# Patient Record
Sex: FEMALE | Race: BLACK OR AFRICAN AMERICAN | NOT HISPANIC OR LATINO | URBAN - METROPOLITAN AREA
[De-identification: names, ages, dates, MRNs, and addresses within clinical notes are randomized per-mention and may not be internally consistent; named-entity substitution may affect disease eponyms.]

---

## 2017-03-01 ENCOUNTER — OUTPATIENT (OUTPATIENT)
Dept: OUTPATIENT SERVICES | Facility: HOSPITAL | Age: 62
LOS: 1 days | End: 2017-03-01
Payer: MEDICAID

## 2017-03-13 DIAGNOSIS — R69 ILLNESS, UNSPECIFIED: ICD-10-CM

## 2017-06-15 ENCOUNTER — RESULT REVIEW (OUTPATIENT)
Age: 62
End: 2017-06-15

## 2017-06-27 ENCOUNTER — EMERGENCY (EMERGENCY)
Facility: HOSPITAL | Age: 62
LOS: 1 days | Discharge: HOME | End: 2017-06-27
Attending: EMERGENCY MEDICINE | Admitting: EMERGENCY MEDICINE
Payer: COMMERCIAL

## 2017-06-27 VITALS
HEIGHT: 61 IN | HEART RATE: 97 BPM | DIASTOLIC BLOOD PRESSURE: 85 MMHG | RESPIRATION RATE: 20 BRPM | SYSTOLIC BLOOD PRESSURE: 139 MMHG | TEMPERATURE: 99 F | OXYGEN SATURATION: 94 %

## 2017-06-27 PROCEDURE — 99285 EMERGENCY DEPT VISIT HI MDM: CPT

## 2017-06-27 RX ORDER — SODIUM CHLORIDE 9 MG/ML
1000 INJECTION INTRAMUSCULAR; INTRAVENOUS; SUBCUTANEOUS ONCE
Qty: 0 | Refills: 0 | Status: COMPLETED | OUTPATIENT
Start: 2017-06-27 | End: 2017-06-27

## 2017-06-27 RX ORDER — SODIUM CHLORIDE 9 MG/ML
3 INJECTION INTRAMUSCULAR; INTRAVENOUS; SUBCUTANEOUS ONCE
Qty: 0 | Refills: 0 | Status: COMPLETED | OUTPATIENT
Start: 2017-06-27 | End: 2017-06-27

## 2017-06-27 RX ADMIN — SODIUM CHLORIDE 1000 MILLILITER(S): 9 INJECTION INTRAMUSCULAR; INTRAVENOUS; SUBCUTANEOUS at 23:57

## 2017-06-27 RX ADMIN — SODIUM CHLORIDE 3 MILLILITER(S): 9 INJECTION INTRAMUSCULAR; INTRAVENOUS; SUBCUTANEOUS at 23:57

## 2017-06-27 NOTE — ED ADULT NURSE NOTE - PMH
Adult Hypothyroidism    Arthritis    Borderline Diabetes Mellitus    Cellulitis  of LLE  Chronic Asthma    Cigarette Smoker    CVA (Cerebral Vascular Accident)    Diverticulosis    H/O: HTN    High Cholesterol    RA (Rheumatoid Arthritis)    Tooth Abscess

## 2017-06-27 NOTE — ED ADULT NURSE NOTE - OBJECTIVE STATEMENT
62 y/o female presents to ED c/o of diarrhea x 2 weeks s/p amoxicillin use for left toe infection. Pt states she was on access-a-ride when they stopped short and she tripped and stubbed toe. Pt was placed on antibiotics and has toe dressed daily. Pt states since she has been on antibiotics, she has been having "mushy and muddy." Pt states she has been having a lot of gas and feels epigastric pressure. Pt also states she had nasal congestion. Pt denies fever, or vomiting. Pt lungs clear bilaterally. Skin warm, dry. Pt has wound on left great toe wound, no drainage noted. Pt abdomen soft, non-distended, non-tender. Pt safety maintained.

## 2017-06-27 NOTE — ED ADULT TRIAGE NOTE - CHIEF COMPLAINT QUOTE
recent hx toe infection, receiving wound care and started on amoxicillin, now with change in stool pattern: "watery to mush", +dark stool x 1 wk, sent for r/o cdiff

## 2017-06-28 VITALS
DIASTOLIC BLOOD PRESSURE: 94 MMHG | SYSTOLIC BLOOD PRESSURE: 161 MMHG | OXYGEN SATURATION: 96 % | RESPIRATION RATE: 20 BRPM | HEART RATE: 93 BPM | TEMPERATURE: 98 F

## 2017-06-28 LAB
ALBUMIN SERPL ELPH-MCNC: 4 G/DL — SIGNIFICANT CHANGE UP (ref 3.3–5)
ALP SERPL-CCNC: 89 U/L — SIGNIFICANT CHANGE UP (ref 40–120)
ALT FLD-CCNC: 26 U/L RC — SIGNIFICANT CHANGE UP (ref 10–45)
ANION GAP SERPL CALC-SCNC: 16 MMOL/L — SIGNIFICANT CHANGE UP (ref 5–17)
APPEARANCE UR: CLEAR — SIGNIFICANT CHANGE UP
AST SERPL-CCNC: 21 U/L — SIGNIFICANT CHANGE UP (ref 10–40)
BASE EXCESS BLDV CALC-SCNC: 2.7 MMOL/L — HIGH (ref -2–2)
BASOPHILS # BLD AUTO: 0.1 K/UL — SIGNIFICANT CHANGE UP (ref 0–0.2)
BASOPHILS NFR BLD AUTO: 0.8 % — SIGNIFICANT CHANGE UP (ref 0–2)
BILIRUB SERPL-MCNC: 0.1 MG/DL — LOW (ref 0.2–1.2)
BILIRUB UR-MCNC: NEGATIVE — SIGNIFICANT CHANGE UP
BUN SERPL-MCNC: 18 MG/DL — SIGNIFICANT CHANGE UP (ref 7–23)
CA-I SERPL-SCNC: 1.24 MMOL/L — SIGNIFICANT CHANGE UP (ref 1.12–1.3)
CALCIUM SERPL-MCNC: 9.8 MG/DL — SIGNIFICANT CHANGE UP (ref 8.4–10.5)
CHLORIDE BLDV-SCNC: 109 MMOL/L — HIGH (ref 96–108)
CHLORIDE SERPL-SCNC: 103 MMOL/L — SIGNIFICANT CHANGE UP (ref 96–108)
CO2 BLDV-SCNC: 31 MMOL/L — HIGH (ref 22–30)
CO2 SERPL-SCNC: 26 MMOL/L — SIGNIFICANT CHANGE UP (ref 22–31)
COLOR SPEC: YELLOW — SIGNIFICANT CHANGE UP
COMMENT - URINE: SIGNIFICANT CHANGE UP
CREAT SERPL-MCNC: 1.09 MG/DL — SIGNIFICANT CHANGE UP (ref 0.5–1.3)
DIFF PNL FLD: NEGATIVE — SIGNIFICANT CHANGE UP
EOSINOPHIL # BLD AUTO: 0 K/UL — SIGNIFICANT CHANGE UP (ref 0–0.5)
EOSINOPHIL NFR BLD AUTO: 0.3 % — SIGNIFICANT CHANGE UP (ref 0–6)
EPI CELLS # UR: SIGNIFICANT CHANGE UP /HPF
GAS PNL BLDV: 142 MMOL/L — SIGNIFICANT CHANGE UP (ref 136–145)
GAS PNL BLDV: SIGNIFICANT CHANGE UP
GAS PNL BLDV: SIGNIFICANT CHANGE UP
GLUCOSE BLDV-MCNC: 175 MG/DL — HIGH (ref 70–99)
GLUCOSE SERPL-MCNC: 178 MG/DL — HIGH (ref 70–99)
GLUCOSE UR QL: ABNORMAL
HCO3 BLDV-SCNC: 29 MMOL/L — SIGNIFICANT CHANGE UP (ref 21–29)
HCT VFR BLD CALC: 40.5 % — SIGNIFICANT CHANGE UP (ref 34.5–45)
HCT VFR BLDA CALC: 40 % — SIGNIFICANT CHANGE UP (ref 39–50)
HGB BLD CALC-MCNC: 13.1 G/DL — SIGNIFICANT CHANGE UP (ref 11.5–15.5)
HGB BLD-MCNC: 13.5 G/DL — SIGNIFICANT CHANGE UP (ref 11.5–15.5)
HOROWITZ INDEX BLDV+IHG-RTO: SIGNIFICANT CHANGE UP
HYALINE CASTS # UR AUTO: ABNORMAL
KETONES UR-MCNC: NEGATIVE — SIGNIFICANT CHANGE UP
LACTATE BLDV-MCNC: 3.9 MMOL/L — HIGH (ref 0.7–2)
LEUKOCYTE ESTERASE UR-ACNC: ABNORMAL
LYMPHOCYTES # BLD AUTO: 31.2 % — SIGNIFICANT CHANGE UP (ref 13–44)
LYMPHOCYTES # BLD AUTO: 4.2 K/UL — HIGH (ref 1–3.3)
MCHC RBC-ENTMCNC: 33.5 GM/DL — SIGNIFICANT CHANGE UP (ref 32–36)
MCHC RBC-ENTMCNC: 34.7 PG — HIGH (ref 27–34)
MCV RBC AUTO: 104 FL — HIGH (ref 80–100)
MONOCYTES # BLD AUTO: 1 K/UL — HIGH (ref 0–0.9)
MONOCYTES NFR BLD AUTO: 7.2 % — SIGNIFICANT CHANGE UP (ref 2–14)
NEUTROPHILS # BLD AUTO: 8.1 K/UL — HIGH (ref 1.8–7.4)
NEUTROPHILS NFR BLD AUTO: 60.5 % — SIGNIFICANT CHANGE UP (ref 43–77)
NITRITE UR-MCNC: NEGATIVE — SIGNIFICANT CHANGE UP
OTHER CELLS CSF MANUAL: 10 ML/DL — LOW (ref 18–22)
PCO2 BLDV: 57 MMHG — HIGH (ref 35–50)
PH BLDV: 7.33 — LOW (ref 7.35–7.45)
PH UR: 6 — SIGNIFICANT CHANGE UP (ref 5–8)
PLATELET # BLD AUTO: 247 K/UL — SIGNIFICANT CHANGE UP (ref 150–400)
PO2 BLDV: 35 MMHG — SIGNIFICANT CHANGE UP (ref 25–45)
POTASSIUM BLDV-SCNC: 3.9 MMOL/L — SIGNIFICANT CHANGE UP (ref 3.5–5)
POTASSIUM SERPL-MCNC: 4.4 MMOL/L — SIGNIFICANT CHANGE UP (ref 3.5–5.3)
POTASSIUM SERPL-SCNC: 4.4 MMOL/L — SIGNIFICANT CHANGE UP (ref 3.5–5.3)
PROT SERPL-MCNC: 6.9 G/DL — SIGNIFICANT CHANGE UP (ref 6–8.3)
PROT UR-MCNC: 30 MG/DL
RBC # BLD: 3.9 M/UL — SIGNIFICANT CHANGE UP (ref 3.8–5.2)
RBC # FLD: 13.3 % — SIGNIFICANT CHANGE UP (ref 10.3–14.5)
RBC CASTS # UR COMP ASSIST: SIGNIFICANT CHANGE UP /HPF (ref 0–2)
SAO2 % BLDV: 58 % — LOW (ref 67–88)
SODIUM SERPL-SCNC: 145 MMOL/L — SIGNIFICANT CHANGE UP (ref 135–145)
SP GR SPEC: 1.02 — SIGNIFICANT CHANGE UP (ref 1.01–1.02)
UROBILINOGEN FLD QL: 1
WBC # BLD: 13.3 K/UL — HIGH (ref 3.8–10.5)
WBC # FLD AUTO: 13.3 K/UL — HIGH (ref 3.8–10.5)
WBC UR QL: SIGNIFICANT CHANGE UP /HPF (ref 0–5)

## 2017-06-28 PROCEDURE — 74177 CT ABD & PELVIS W/CONTRAST: CPT | Mod: 26

## 2017-06-28 PROCEDURE — 82947 ASSAY GLUCOSE BLOOD QUANT: CPT

## 2017-06-28 PROCEDURE — 81001 URINALYSIS AUTO W/SCOPE: CPT

## 2017-06-28 PROCEDURE — 82803 BLOOD GASES ANY COMBINATION: CPT

## 2017-06-28 PROCEDURE — 74177 CT ABD & PELVIS W/CONTRAST: CPT

## 2017-06-28 PROCEDURE — 85014 HEMATOCRIT: CPT

## 2017-06-28 PROCEDURE — 87449 NOS EACH ORGANISM AG IA: CPT

## 2017-06-28 PROCEDURE — 99284 EMERGENCY DEPT VISIT MOD MDM: CPT | Mod: 25

## 2017-06-28 PROCEDURE — 85027 COMPLETE CBC AUTOMATED: CPT

## 2017-06-28 PROCEDURE — 83605 ASSAY OF LACTIC ACID: CPT

## 2017-06-28 PROCEDURE — 80053 COMPREHEN METABOLIC PANEL: CPT

## 2017-06-28 PROCEDURE — 82330 ASSAY OF CALCIUM: CPT

## 2017-06-28 PROCEDURE — 84295 ASSAY OF SERUM SODIUM: CPT

## 2017-06-28 PROCEDURE — 82435 ASSAY OF BLOOD CHLORIDE: CPT

## 2017-06-28 PROCEDURE — 84132 ASSAY OF SERUM POTASSIUM: CPT

## 2017-06-28 PROCEDURE — 87324 CLOSTRIDIUM AG IA: CPT

## 2017-06-28 NOTE — ED PROVIDER NOTE - PROGRESS NOTE DETAILS
jO: Patient signed out to f/u CT. CT negative. Per sign out, if no colitis on CT, patient cleared to go home. Spoke with patient and agrees.

## 2017-06-28 NOTE — ED PROVIDER NOTE - OBJECTIVE STATEMENT
62 y/o f with pmhx asthma,. smoker, CVA, HTN HLD RA, current smoker, presents for concern for diarrhea c diff. was on over 2 weeks of abx, most recently aug for toe infection. prior to that was on another abx for sinus infection. multiple episode of watery non bloody diarrhea. + abd pain crampy. no fever. no chills. son at bedside. no recent travel.

## 2017-09-01 PROCEDURE — G9001: CPT

## 2017-09-04 ENCOUNTER — INPATIENT (INPATIENT)
Facility: HOSPITAL | Age: 62
LOS: 7 days | Discharge: HOME | End: 2017-09-12
Attending: INTERNAL MEDICINE | Admitting: INTERNAL MEDICINE
Payer: MEDICARE

## 2017-09-04 VITALS
HEART RATE: 110 BPM | SYSTOLIC BLOOD PRESSURE: 143 MMHG | DIASTOLIC BLOOD PRESSURE: 80 MMHG | RESPIRATION RATE: 24 BRPM | TEMPERATURE: 99 F | OXYGEN SATURATION: 98 %

## 2017-09-04 DIAGNOSIS — R06.02 SHORTNESS OF BREATH: ICD-10-CM

## 2017-09-04 LAB
ALBUMIN SERPL ELPH-MCNC: 3.7 G/DL — SIGNIFICANT CHANGE UP (ref 3.3–5)
ALP SERPL-CCNC: 101 U/L — SIGNIFICANT CHANGE UP (ref 40–120)
ALT FLD-CCNC: 33 U/L — SIGNIFICANT CHANGE UP (ref 4–33)
APTT BLD: 27.7 SEC — SIGNIFICANT CHANGE UP (ref 27.5–37.4)
AST SERPL-CCNC: 60 U/L — HIGH (ref 4–32)
BASE EXCESS BLDV CALC-SCNC: -1.5 MMOL/L — SIGNIFICANT CHANGE UP
BASE EXCESS BLDV CALC-SCNC: -6.6 MMOL/L — SIGNIFICANT CHANGE UP
BASOPHILS # BLD AUTO: 0.02 K/UL — SIGNIFICANT CHANGE UP (ref 0–0.2)
BASOPHILS NFR BLD AUTO: 0.2 % — SIGNIFICANT CHANGE UP (ref 0–2)
BILIRUB SERPL-MCNC: < 0.2 MG/DL — LOW (ref 0.2–1.2)
BLOOD GAS VENOUS - CREATININE: 1.11 MG/DL — SIGNIFICANT CHANGE UP (ref 0.5–1.3)
BLOOD GAS VENOUS - CREATININE: 1.25 MG/DL — SIGNIFICANT CHANGE UP (ref 0.5–1.3)
BUN SERPL-MCNC: 22 MG/DL — SIGNIFICANT CHANGE UP (ref 7–23)
BUN SERPL-MCNC: 22 MG/DL — SIGNIFICANT CHANGE UP (ref 7–23)
CALCIUM SERPL-MCNC: 8.3 MG/DL — LOW (ref 8.4–10.5)
CALCIUM SERPL-MCNC: 8.3 MG/DL — LOW (ref 8.4–10.5)
CHLORIDE BLDV-SCNC: 110 MMOL/L — HIGH (ref 96–108)
CHLORIDE BLDV-SCNC: 114 MMOL/L — HIGH (ref 96–108)
CHLORIDE SERPL-SCNC: 103 MMOL/L — SIGNIFICANT CHANGE UP (ref 98–107)
CHLORIDE SERPL-SCNC: 106 MMOL/L — SIGNIFICANT CHANGE UP (ref 98–107)
CK MB BLD-MCNC: 0.6 — SIGNIFICANT CHANGE UP (ref 0–2.5)
CK MB BLD-MCNC: 2.47 NG/ML — SIGNIFICANT CHANGE UP (ref 1–4.7)
CK SERPL-CCNC: 433 U/L — HIGH (ref 25–170)
CO2 SERPL-SCNC: 17 MMOL/L — LOW (ref 22–31)
CO2 SERPL-SCNC: 20 MMOL/L — LOW (ref 22–31)
CREAT SERPL-MCNC: 1.27 MG/DL — SIGNIFICANT CHANGE UP (ref 0.5–1.3)
CREAT SERPL-MCNC: 1.34 MG/DL — HIGH (ref 0.5–1.3)
EOSINOPHIL # BLD AUTO: 0.02 K/UL — SIGNIFICANT CHANGE UP (ref 0–0.5)
EOSINOPHIL NFR BLD AUTO: 0.2 % — SIGNIFICANT CHANGE UP (ref 0–6)
GAS PNL BLDV: 138 MMOL/L — SIGNIFICANT CHANGE UP (ref 136–146)
GAS PNL BLDV: 144 MMOL/L — SIGNIFICANT CHANGE UP (ref 136–146)
GLUCOSE BLDV-MCNC: 415 — HIGH (ref 70–99)
GLUCOSE BLDV-MCNC: 418 — HIGH (ref 70–99)
GLUCOSE SERPL-MCNC: 406 MG/DL — HIGH (ref 70–99)
GLUCOSE SERPL-MCNC: 415 MG/DL — HIGH (ref 70–99)
HCO3 BLDV-SCNC: 19 MMOL/L — LOW (ref 20–27)
HCO3 BLDV-SCNC: 23 MMOL/L — SIGNIFICANT CHANGE UP (ref 20–27)
HCT VFR BLD CALC: 40.7 % — SIGNIFICANT CHANGE UP (ref 34.5–45)
HCT VFR BLDV CALC: 34 % — LOW (ref 34.5–45)
HCT VFR BLDV CALC: 37.9 % — SIGNIFICANT CHANGE UP (ref 34.5–45)
HGB BLD-MCNC: 12.6 G/DL — SIGNIFICANT CHANGE UP (ref 11.5–15.5)
HGB BLDV-MCNC: 11 G/DL — LOW (ref 11.5–15.5)
HGB BLDV-MCNC: 12.3 G/DL — SIGNIFICANT CHANGE UP (ref 11.5–15.5)
IMM GRANULOCYTES # BLD AUTO: 0.09 # — SIGNIFICANT CHANGE UP
IMM GRANULOCYTES NFR BLD AUTO: 0.7 % — SIGNIFICANT CHANGE UP (ref 0–1.5)
INR BLD: 0.95 — SIGNIFICANT CHANGE UP (ref 0.88–1.17)
LACTATE BLDV-MCNC: 3.9 MMOL/L — HIGH (ref 0.5–2)
LACTATE BLDV-MCNC: 6.7 MMOL/L — CRITICAL HIGH (ref 0.5–2)
LYMPHOCYTES # BLD AUTO: 0.9 K/UL — LOW (ref 1–3.3)
LYMPHOCYTES # BLD AUTO: 7.3 % — LOW (ref 13–44)
MCHC RBC-ENTMCNC: 31 % — LOW (ref 32–36)
MCHC RBC-ENTMCNC: 32.3 PG — SIGNIFICANT CHANGE UP (ref 27–34)
MCV RBC AUTO: 104.4 FL — HIGH (ref 80–100)
MONOCYTES # BLD AUTO: 0.39 K/UL — SIGNIFICANT CHANGE UP (ref 0–0.9)
MONOCYTES NFR BLD AUTO: 3.2 % — SIGNIFICANT CHANGE UP (ref 2–14)
NEUTROPHILS # BLD AUTO: 10.93 K/UL — HIGH (ref 1.8–7.4)
NEUTROPHILS NFR BLD AUTO: 88.4 % — HIGH (ref 43–77)
NRBC # FLD: 0.04 — SIGNIFICANT CHANGE UP
NT-PROBNP SERPL-SCNC: 257.6 PG/ML — SIGNIFICANT CHANGE UP
PCO2 BLDV: 44 MMHG — SIGNIFICANT CHANGE UP (ref 41–51)
PCO2 BLDV: 47 MMHG — SIGNIFICANT CHANGE UP (ref 41–51)
PH BLDV: 7.26 PH — LOW (ref 7.32–7.43)
PH BLDV: 7.32 PH — SIGNIFICANT CHANGE UP (ref 7.32–7.43)
PLATELET # BLD AUTO: 291 K/UL — SIGNIFICANT CHANGE UP (ref 150–400)
PMV BLD: 10.5 FL — SIGNIFICANT CHANGE UP (ref 7–13)
PO2 BLDV: 53 MMHG — HIGH (ref 35–40)
PO2 BLDV: 56 MMHG — HIGH (ref 35–40)
POTASSIUM BLDV-SCNC: 4.2 MMOL/L — SIGNIFICANT CHANGE UP (ref 3.4–4.5)
POTASSIUM BLDV-SCNC: 6.1 MMOL/L — HIGH (ref 3.4–4.5)
POTASSIUM SERPL-MCNC: 4.7 MMOL/L — SIGNIFICANT CHANGE UP (ref 3.5–5.3)
POTASSIUM SERPL-MCNC: SIGNIFICANT CHANGE UP MMOL/L (ref 3.5–5.3)
POTASSIUM SERPL-SCNC: 4.7 MMOL/L — SIGNIFICANT CHANGE UP (ref 3.5–5.3)
POTASSIUM SERPL-SCNC: SIGNIFICANT CHANGE UP MMOL/L (ref 3.5–5.3)
PROT SERPL-MCNC: 7.7 G/DL — SIGNIFICANT CHANGE UP (ref 6–8.3)
PROTHROM AB SERPL-ACNC: 10.6 SEC — SIGNIFICANT CHANGE UP (ref 9.8–13.1)
RBC # BLD: 3.9 M/UL — SIGNIFICANT CHANGE UP (ref 3.8–5.2)
RBC # FLD: 16.4 % — HIGH (ref 10.3–14.5)
SAO2 % BLDV: 84.6 % — SIGNIFICANT CHANGE UP (ref 60–85)
SAO2 % BLDV: 88 % — HIGH (ref 60–85)
SODIUM SERPL-SCNC: 144 MMOL/L — SIGNIFICANT CHANGE UP (ref 135–145)
SODIUM SERPL-SCNC: 146 MMOL/L — HIGH (ref 135–145)
TROPONIN T SERPL-MCNC: < 0.06 NG/ML — SIGNIFICANT CHANGE UP (ref 0–0.06)
WBC # BLD: 12.35 K/UL — HIGH (ref 3.8–10.5)
WBC # FLD AUTO: 12.35 K/UL — HIGH (ref 3.8–10.5)

## 2017-09-04 PROCEDURE — 71010: CPT | Mod: 26

## 2017-09-04 RX ORDER — FUROSEMIDE 40 MG
40 TABLET ORAL ONCE
Qty: 0 | Refills: 0 | Status: COMPLETED | OUTPATIENT
Start: 2017-09-04 | End: 2017-09-04

## 2017-09-04 RX ORDER — INSULIN LISPRO 100/ML
6 VIAL (ML) SUBCUTANEOUS ONCE
Qty: 0 | Refills: 0 | Status: COMPLETED | OUTPATIENT
Start: 2017-09-04 | End: 2017-09-04

## 2017-09-04 RX ADMIN — Medication 6 UNIT(S): at 20:00

## 2017-09-04 RX ADMIN — Medication 40 MILLIGRAM(S): at 18:35

## 2017-09-04 RX ADMIN — Medication 6 UNIT(S): at 21:55

## 2017-09-04 NOTE — ED PROVIDER NOTE - CONSTITUTIONAL, MLM
normal... well nourished, awake, alert, oriented to person, place, time/situation and in moderate distress.

## 2017-09-04 NOTE — ED ADULT NURSE NOTE - OBJECTIVE STATEMENT
Pt A+OX3 and amb.  States she's been having an upper respiratory infection x1 month and recently has been caring for a sick child.  Today began having SOB on exertion and difficulty breathing.  Arrives with O2 100% NRB in use.  SL L arm by EMS.  Pt obese.  Denies PMH asthma.  Rhonchi naga noted.  O2 continued.  Seated upright.  Labs obtained as ordered.  MD at bedside. Pt A+OX3 and amb.  States she's been having an upper respiratory infection x1 month and recently has been caring for a sick child.  Today began having SOB on exertion and difficulty breathing.  Arrives with O2 100% NRB in use.  SL L arm by EMS.  Pt obese.  Denies PMH asthma.  Rhonchi naga noted.  O2 continued.  Seated upright.  Labs obtained as ordered.  CM placed.  EKG done.  MD at bedside.

## 2017-09-04 NOTE — ED PROVIDER NOTE - OBJECTIVE STATEMENT
61 yr old female with hx of asthma, smoker, CVA, HTN, HLD RA, current smoker presents to ed with ems 61 yr old female with hx of asthma, smoker, CVA, HTN, HLD RA, current smoker presents to ed with ems for sob cough, thick yellow mucus for " couple days" without cp.  pt states have been having bilateral worsening leg swelling and abd fullness.  went to pcp and given a zpack with minimal relieve.  pt has been having a cold over 1 month and post nasal drip.  child sick with cold.  ems gave 2 duo nebs and was initially sat 88% now improved to 99% and speaking in full sentences.  pt also admits to feel sob when she moves. + chills, generalized fatigue, no cp, no palpitations, n/v, no back pain.

## 2017-09-04 NOTE — ED PROVIDER NOTE - CARDIAC, MLM
tachy rate, regular rhythm.  Heart sounds S1, S2.  No murmurs, rubs or gallops. bilateral lower extremity with 3+ pitting edema no underlying skin changes

## 2017-09-04 NOTE — ED ADULT TRIAGE NOTE - CHIEF COMPLAINT QUOTE
Pt brought in by EMS from home complaining of SOB, productive cough, thick yellow mucus for the past few days. Pt states she was prescribed a Z pack by her PMD yesterday but states she still does not feel well. Pt complaining of fever and chills, afebrile in triage. Pt given 2 neb treatments by EMS.     producrtive cough chills x2 weeks  2 neb treatemnts by ems

## 2017-09-04 NOTE — ED PROVIDER NOTE - PROGRESS NOTE DETAILS
Derick PGY4: Patient seen by MICU for new Bipap, recommended RCU, spoke with pulm fellow who accepted patient. [place]: pt asx now after being placed on bipap.  Lab shows hemolyzed K will repeat and recheck AG.  insulin 6 unit sq given.  MICU eval and ok to go RCU.  hemodynamically stable.  Admit to RCU for dyspnea likely due to Acute pulmonary edema.

## 2017-09-04 NOTE — ED PROVIDER NOTE - MEDICAL DECISION MAKING DETAILS
61 yr old female with hx of asthma, smoker, CVA, HTN, HLD RA, current smoker presents to ed with ems for sob cough, thick yellow mucus for " couple days" without cp.  pt states have been having bilateral worsening leg swelling and abd fullness.  went to pcp and given a zpack with minimal relieve.  pt has been having a cold over 1 month and post nasal drip.  child sick with cold.  ems gave 2 duo nebs and was initially sat 88% now improved to 99% and speaking in full sentences.  pt also admits to feel sob when she moves. + chills, generalized fatigue, no cp, no palpitations, n/v, no back pain    Concern for Acute pulmonary edema due to worsening chf r/o acs vs copd exacerbation vs pna vs PTX  Obtain labs, cxr, Bipap, lasix, ekg, admit

## 2017-09-05 DIAGNOSIS — M06.9 RHEUMATOID ARTHRITIS, UNSPECIFIED: ICD-10-CM

## 2017-09-05 DIAGNOSIS — I10 ESSENTIAL (PRIMARY) HYPERTENSION: ICD-10-CM

## 2017-09-05 DIAGNOSIS — R73.9 HYPERGLYCEMIA, UNSPECIFIED: ICD-10-CM

## 2017-09-05 DIAGNOSIS — J45.909 UNSPECIFIED ASTHMA, UNCOMPLICATED: ICD-10-CM

## 2017-09-05 DIAGNOSIS — Z29.9 ENCOUNTER FOR PROPHYLACTIC MEASURES, UNSPECIFIED: ICD-10-CM

## 2017-09-05 DIAGNOSIS — R74.8 ABNORMAL LEVELS OF OTHER SERUM ENZYMES: ICD-10-CM

## 2017-09-05 DIAGNOSIS — R06.02 SHORTNESS OF BREATH: ICD-10-CM

## 2017-09-05 DIAGNOSIS — N17.9 ACUTE KIDNEY FAILURE, UNSPECIFIED: ICD-10-CM

## 2017-09-05 LAB
BUN SERPL-MCNC: 18 MG/DL — SIGNIFICANT CHANGE UP (ref 7–23)
CALCIUM SERPL-MCNC: 8.3 MG/DL — LOW (ref 8.4–10.5)
CHLORIDE SERPL-SCNC: 107 MMOL/L — SIGNIFICANT CHANGE UP (ref 98–107)
CO2 SERPL-SCNC: 27 MMOL/L — SIGNIFICANT CHANGE UP (ref 22–31)
CREAT SERPL-MCNC: 0.99 MG/DL — SIGNIFICANT CHANGE UP (ref 0.5–1.3)
GLUCOSE SERPL-MCNC: 188 MG/DL — HIGH (ref 70–99)
GRAM STN SPT: SIGNIFICANT CHANGE UP
HBA1C BLD-MCNC: 8.9 % — HIGH (ref 4–5.6)
HCT VFR BLD CALC: 35.6 % — SIGNIFICANT CHANGE UP (ref 34.5–45)
HGB BLD-MCNC: 11.1 G/DL — LOW (ref 11.5–15.5)
MAGNESIUM SERPL-MCNC: 2.1 MG/DL — SIGNIFICANT CHANGE UP (ref 1.6–2.6)
MCHC RBC-ENTMCNC: 31.2 % — LOW (ref 32–36)
MCHC RBC-ENTMCNC: 32.1 PG — SIGNIFICANT CHANGE UP (ref 27–34)
MCV RBC AUTO: 102.9 FL — HIGH (ref 80–100)
NRBC # FLD: 0.03 — SIGNIFICANT CHANGE UP
PHOSPHATE SERPL-MCNC: 1.7 MG/DL — LOW (ref 2.5–4.5)
PLATELET # BLD AUTO: 260 K/UL — SIGNIFICANT CHANGE UP (ref 150–400)
PMV BLD: 10.2 FL — SIGNIFICANT CHANGE UP (ref 7–13)
POTASSIUM SERPL-MCNC: 4.2 MMOL/L — SIGNIFICANT CHANGE UP (ref 3.5–5.3)
POTASSIUM SERPL-SCNC: 4.2 MMOL/L — SIGNIFICANT CHANGE UP (ref 3.5–5.3)
RBC # BLD: 3.46 M/UL — LOW (ref 3.8–5.2)
RBC # FLD: 16.3 % — HIGH (ref 10.3–14.5)
SODIUM SERPL-SCNC: 146 MMOL/L — HIGH (ref 135–145)
SPECIMEN SOURCE: SIGNIFICANT CHANGE UP
WBC # BLD: 11.96 K/UL — HIGH (ref 3.8–10.5)
WBC # FLD AUTO: 11.96 K/UL — HIGH (ref 3.8–10.5)

## 2017-09-05 PROCEDURE — 99222 1ST HOSP IP/OBS MODERATE 55: CPT

## 2017-09-05 RX ORDER — CEFTRIAXONE 500 MG/1
1 INJECTION, POWDER, FOR SOLUTION INTRAMUSCULAR; INTRAVENOUS EVERY 24 HOURS
Qty: 0 | Refills: 0 | Status: DISCONTINUED | OUTPATIENT
Start: 2017-09-05 | End: 2017-09-05

## 2017-09-05 RX ORDER — IPRATROPIUM/ALBUTEROL SULFATE 18-103MCG
3 AEROSOL WITH ADAPTER (GRAM) INHALATION EVERY 6 HOURS
Qty: 0 | Refills: 0 | Status: DISCONTINUED | OUTPATIENT
Start: 2017-09-05 | End: 2017-09-12

## 2017-09-05 RX ORDER — OXYBUTYNIN CHLORIDE 5 MG
5 TABLET ORAL
Qty: 0 | Refills: 0 | Status: DISCONTINUED | OUTPATIENT
Start: 2017-09-05 | End: 2017-09-12

## 2017-09-05 RX ORDER — PANTOPRAZOLE SODIUM 20 MG/1
40 TABLET, DELAYED RELEASE ORAL
Qty: 0 | Refills: 0 | Status: DISCONTINUED | OUTPATIENT
Start: 2017-09-05 | End: 2017-09-12

## 2017-09-05 RX ORDER — IPRATROPIUM/ALBUTEROL SULFATE 18-103MCG
3 AEROSOL WITH ADAPTER (GRAM) INHALATION EVERY 6 HOURS
Qty: 0 | Refills: 0 | Status: DISCONTINUED | OUTPATIENT
Start: 2017-09-05 | End: 2017-09-05

## 2017-09-05 RX ORDER — DEXTROSE 50 % IN WATER 50 %
12.5 SYRINGE (ML) INTRAVENOUS ONCE
Qty: 0 | Refills: 0 | Status: DISCONTINUED | OUTPATIENT
Start: 2017-09-05 | End: 2017-09-12

## 2017-09-05 RX ORDER — HEPARIN SODIUM 5000 [USP'U]/ML
5000 INJECTION INTRAVENOUS; SUBCUTANEOUS EVERY 8 HOURS
Qty: 0 | Refills: 0 | Status: DISCONTINUED | OUTPATIENT
Start: 2017-09-05 | End: 2017-09-12

## 2017-09-05 RX ORDER — TIOTROPIUM BROMIDE 18 UG/1
1 CAPSULE ORAL; RESPIRATORY (INHALATION) DAILY
Qty: 0 | Refills: 0 | Status: DISCONTINUED | OUTPATIENT
Start: 2017-09-05 | End: 2017-09-12

## 2017-09-05 RX ORDER — LEVOTHYROXINE SODIUM 125 MCG
100 TABLET ORAL DAILY
Qty: 0 | Refills: 0 | Status: DISCONTINUED | OUTPATIENT
Start: 2017-09-05 | End: 2017-09-12

## 2017-09-05 RX ORDER — INSULIN GLARGINE 100 [IU]/ML
10 INJECTION, SOLUTION SUBCUTANEOUS ONCE
Qty: 0 | Refills: 0 | Status: DISCONTINUED | OUTPATIENT
Start: 2017-09-05 | End: 2017-09-05

## 2017-09-05 RX ORDER — AZITHROMYCIN 500 MG/1
500 TABLET, FILM COATED ORAL DAILY
Qty: 0 | Refills: 0 | Status: DISCONTINUED | OUTPATIENT
Start: 2017-09-05 | End: 2017-09-05

## 2017-09-05 RX ORDER — ACETAMINOPHEN 500 MG
650 TABLET ORAL EVERY 6 HOURS
Qty: 0 | Refills: 0 | Status: DISCONTINUED | OUTPATIENT
Start: 2017-09-05 | End: 2017-09-12

## 2017-09-05 RX ORDER — FUROSEMIDE 40 MG
40 TABLET ORAL
Qty: 0 | Refills: 0 | Status: DISCONTINUED | OUTPATIENT
Start: 2017-09-05 | End: 2017-09-06

## 2017-09-05 RX ORDER — TRAMADOL HYDROCHLORIDE 50 MG/1
75 TABLET ORAL EVERY 6 HOURS
Qty: 0 | Refills: 0 | Status: DISCONTINUED | OUTPATIENT
Start: 2017-09-05 | End: 2017-09-07

## 2017-09-05 RX ORDER — SODIUM CHLORIDE 9 MG/ML
1000 INJECTION, SOLUTION INTRAVENOUS
Qty: 0 | Refills: 0 | Status: DISCONTINUED | OUTPATIENT
Start: 2017-09-05 | End: 2017-09-12

## 2017-09-05 RX ORDER — GLUCAGON INJECTION, SOLUTION 0.5 MG/.1ML
1 INJECTION, SOLUTION SUBCUTANEOUS ONCE
Qty: 0 | Refills: 0 | Status: DISCONTINUED | OUTPATIENT
Start: 2017-09-05 | End: 2017-09-12

## 2017-09-05 RX ORDER — LACTOBACILLUS ACIDOPHILUS 100MM CELL
1 CAPSULE ORAL
Qty: 0 | Refills: 0 | Status: DISCONTINUED | OUTPATIENT
Start: 2017-09-05 | End: 2017-09-12

## 2017-09-05 RX ORDER — NIFEDIPINE 30 MG
60 TABLET, EXTENDED RELEASE 24 HR ORAL DAILY
Qty: 0 | Refills: 0 | Status: DISCONTINUED | OUTPATIENT
Start: 2017-09-05 | End: 2017-09-12

## 2017-09-05 RX ORDER — DEXTROSE 50 % IN WATER 50 %
1 SYRINGE (ML) INTRAVENOUS ONCE
Qty: 0 | Refills: 0 | Status: DISCONTINUED | OUTPATIENT
Start: 2017-09-05 | End: 2017-09-12

## 2017-09-05 RX ORDER — AMLODIPINE BESYLATE 2.5 MG/1
5 TABLET ORAL DAILY
Qty: 0 | Refills: 0 | Status: DISCONTINUED | OUTPATIENT
Start: 2017-09-05 | End: 2017-09-12

## 2017-09-05 RX ORDER — TIOTROPIUM BROMIDE 18 UG/1
1 CAPSULE ORAL; RESPIRATORY (INHALATION) DAILY
Qty: 0 | Refills: 0 | Status: DISCONTINUED | OUTPATIENT
Start: 2017-09-05 | End: 2017-09-05

## 2017-09-05 RX ORDER — DEXTROSE 50 % IN WATER 50 %
25 SYRINGE (ML) INTRAVENOUS ONCE
Qty: 0 | Refills: 0 | Status: DISCONTINUED | OUTPATIENT
Start: 2017-09-05 | End: 2017-09-12

## 2017-09-05 RX ORDER — FLUTICASONE PROPIONATE 50 MCG
1 SPRAY, SUSPENSION NASAL
Qty: 0 | Refills: 0 | Status: DISCONTINUED | OUTPATIENT
Start: 2017-09-05 | End: 2017-09-12

## 2017-09-05 RX ORDER — TRAMADOL HYDROCHLORIDE 50 MG/1
75 TABLET ORAL EVERY 6 HOURS
Qty: 0 | Refills: 0 | Status: DISCONTINUED | OUTPATIENT
Start: 2017-09-05 | End: 2017-09-05

## 2017-09-05 RX ORDER — ALBUTEROL 90 UG/1
1 AEROSOL, METERED ORAL EVERY 4 HOURS
Qty: 0 | Refills: 0 | Status: DISCONTINUED | OUTPATIENT
Start: 2017-09-05 | End: 2017-09-12

## 2017-09-05 RX ORDER — SODIUM,POTASSIUM PHOSPHATES 278-250MG
1 POWDER IN PACKET (EA) ORAL
Qty: 0 | Refills: 0 | Status: COMPLETED | OUTPATIENT
Start: 2017-09-05 | End: 2017-09-07

## 2017-09-05 RX ORDER — CLOPIDOGREL BISULFATE 75 MG/1
75 TABLET, FILM COATED ORAL DAILY
Qty: 0 | Refills: 0 | Status: DISCONTINUED | OUTPATIENT
Start: 2017-09-05 | End: 2017-09-12

## 2017-09-05 RX ORDER — INSULIN GLARGINE 100 [IU]/ML
10 INJECTION, SOLUTION SUBCUTANEOUS AT BEDTIME
Qty: 0 | Refills: 0 | Status: DISCONTINUED | OUTPATIENT
Start: 2017-09-05 | End: 2017-09-12

## 2017-09-05 RX ORDER — INSULIN LISPRO 100/ML
VIAL (ML) SUBCUTANEOUS
Qty: 0 | Refills: 0 | Status: DISCONTINUED | OUTPATIENT
Start: 2017-09-05 | End: 2017-09-09

## 2017-09-05 RX ORDER — GABAPENTIN 400 MG/1
300 CAPSULE ORAL THREE TIMES A DAY
Qty: 0 | Refills: 0 | Status: DISCONTINUED | OUTPATIENT
Start: 2017-09-05 | End: 2017-09-12

## 2017-09-05 RX ORDER — LORATADINE 10 MG/1
10 TABLET ORAL DAILY
Qty: 0 | Refills: 0 | Status: DISCONTINUED | OUTPATIENT
Start: 2017-09-05 | End: 2017-09-12

## 2017-09-05 RX ADMIN — Medication 40 MILLIGRAM(S): at 17:39

## 2017-09-05 RX ADMIN — INSULIN GLARGINE 10 UNIT(S): 100 INJECTION, SOLUTION SUBCUTANEOUS at 21:32

## 2017-09-05 RX ADMIN — PANTOPRAZOLE SODIUM 40 MILLIGRAM(S): 20 TABLET, DELAYED RELEASE ORAL at 05:51

## 2017-09-05 RX ADMIN — TRAMADOL HYDROCHLORIDE 75 MILLIGRAM(S): 50 TABLET ORAL at 21:11

## 2017-09-05 RX ADMIN — Medication 1 TABLET(S): at 12:31

## 2017-09-05 RX ADMIN — Medication 60 MILLIGRAM(S): at 05:52

## 2017-09-05 RX ADMIN — TRAMADOL HYDROCHLORIDE 75 MILLIGRAM(S): 50 TABLET ORAL at 20:32

## 2017-09-05 RX ADMIN — CLOPIDOGREL BISULFATE 75 MILLIGRAM(S): 75 TABLET, FILM COATED ORAL at 12:31

## 2017-09-05 RX ADMIN — Medication 4: at 09:23

## 2017-09-05 RX ADMIN — GABAPENTIN 300 MILLIGRAM(S): 400 CAPSULE ORAL at 14:18

## 2017-09-05 RX ADMIN — LORATADINE 10 MILLIGRAM(S): 10 TABLET ORAL at 12:31

## 2017-09-05 RX ADMIN — TRAMADOL HYDROCHLORIDE 75 MILLIGRAM(S): 50 TABLET ORAL at 14:22

## 2017-09-05 RX ADMIN — Medication 5 MILLIGRAM(S): at 05:53

## 2017-09-05 RX ADMIN — AZITHROMYCIN 500 MILLIGRAM(S): 500 TABLET, FILM COATED ORAL at 12:31

## 2017-09-05 RX ADMIN — GABAPENTIN 300 MILLIGRAM(S): 400 CAPSULE ORAL at 05:51

## 2017-09-05 RX ADMIN — Medication 1 TABLET(S): at 20:34

## 2017-09-05 RX ADMIN — Medication 40 MILLIGRAM(S): at 05:52

## 2017-09-05 RX ADMIN — GABAPENTIN 300 MILLIGRAM(S): 400 CAPSULE ORAL at 21:32

## 2017-09-05 RX ADMIN — CEFTRIAXONE 100 GRAM(S): 500 INJECTION, POWDER, FOR SOLUTION INTRAMUSCULAR; INTRAVENOUS at 05:52

## 2017-09-05 RX ADMIN — Medication 3 MILLILITER(S): at 21:19

## 2017-09-05 RX ADMIN — Medication 1 SPRAY(S): at 21:32

## 2017-09-05 RX ADMIN — Medication 1 TABLET(S): at 17:39

## 2017-09-05 RX ADMIN — Medication 1 TABLET(S): at 09:23

## 2017-09-05 RX ADMIN — Medication 100 MICROGRAM(S): at 05:52

## 2017-09-05 RX ADMIN — TRAMADOL HYDROCHLORIDE 75 MILLIGRAM(S): 50 TABLET ORAL at 15:50

## 2017-09-05 RX ADMIN — Medication 8: at 12:25

## 2017-09-05 RX ADMIN — Medication 5 MILLIGRAM(S): at 17:39

## 2017-09-05 RX ADMIN — Medication 3 MILLILITER(S): at 15:20

## 2017-09-05 RX ADMIN — Medication 6: at 17:39

## 2017-09-05 RX ADMIN — AMLODIPINE BESYLATE 5 MILLIGRAM(S): 2.5 TABLET ORAL at 05:52

## 2017-09-05 RX ADMIN — Medication 2: at 23:00

## 2017-09-05 NOTE — CONSULT NOTE ADULT - SUBJECTIVE AND OBJECTIVE BOX
CHIEF COMPLAINT: SOB, cough    HPI:  61 year-old F with PMH HTN, asthma, current active smoker, HLD, RA, and CVA who presented to the ED with shortness of breath and cough productive of yellow sputum. She states that over the past 3 days, she has been coughing up productive yellow sputum. She went to her PCP and received a Zpack, which did not help her symptoms. She returned and was recommended to come to the ED. She also endorses dyspnea both at rest and on exertion. She had been using her inhalers sparingly prior to the past few days but now has been using them increasingly more frequently. She denies CP, but does note palpitations. She also notes increased LE edema earlier, but has improved over the last few days. No fevers or chills, N/V, diarrhea, abdominal pain. In the ED, she was placed on BiPAP.      PAST MEDICAL & SURGICAL HISTORY:  Diverticulosis  Cellulitis: of LLE  CVA (Cerebral Vascular Accident)  RA (Rheumatoid Arthritis)  Tooth Abscess  Arthritis  Cigarette Smoker  Chronic Asthma  Adult Hypothyroidism  Borderline Diabetes Mellitus  High Cholesterol  H/O: HTN  Wrist Disorder: S/P Surgery  History of  Section      FAMILY HISTORY:  No pertinent family history in first degree relatives      SOCIAL HISTORY:  + tobacco use, 42 years, 1/2 ppd    Allergies    No Known Allergies    Intolerances        HOME MEDICATIONS:  As per [place]      REVIEW OF SYSTEMS:  Gen: + fatigue. Negative for fevers, chills  Eyes: no blurred vision or lacrimation  ENT: no tinnitus, vertigo, or difficulty hearing  Resp: + dyspnea. No wheezing, pleuritic chest pain, hemoptysis, or orthopnea  CV: + palpitations. No chest pain, dyspnea on exertion  GI: no nausea, vomiting, abdominal pain, diarrhea, constipation, melena, or hematochezia  : no dysuria, hematuria, discharge, or incontinence  MSK: no arthralgias, joint stiffness, joint swelling, or myalgias  Neuro: no focal deficits, confusion, weakness, dizziness, tremors, or seizures  Skin: no rash, lesions, or edema    OBJECTIVE:  ICU Vital Signs Last 24 Hrs  T(C): 37.1 (04 Sep 2017 23:16), Max: 37.1 (04 Sep 2017 18:00)  T(F): 98.8 (04 Sep 2017 23:16), Max: 98.8 (04 Sep 2017 18:00)  HR: 93 (04 Sep 2017 23:16) (88 - 110)  BP: 156/86 (04 Sep 2017 23:16) (136/88 - 164/88)  BP(mean): --  ABP: --  ABP(mean): --  RR: 17 (04 Sep 2017 23:16) (12 - 24)  SpO2: 100% (04 Sep 2017 23:16) (98% - 100%)        CAPILLARY BLOOD GLUCOSE  206 (05 Sep 2017 00:38)            GENERAL: No acute distress, on BiPAP  HEENT: PERRL, EOMI, MMM, no oropharyngeal lesions  LYMPH: no anterior cervical, posterior cervical, supraclavicular, or inguinal lymphadenopathy  NECK: supple, no stiffness, no JVD, no thyromegaly  Respiratory: respirations mildly labored. Bilateral wheezing, no rhonchi/rales.  CV: Tachycardic. Regular rate and rhythm, no murmurs, gallops, or rubs  Abdomen: abdomen soft, nontender, nondistended, no masses felt, normal bowel sounds  Extremities: strength 5/5 bilateral upper/lower extremities. No joint swelling, erythema, or warmth.  NEURO: A&Ox3, no tremors, sensation intact  SKIN: 1+ LE edema. No rashes or lesions    HOSPITAL MEDICATIONS:  MEDICATIONS  (STANDING):  insulin lispro (HumaLOG) corrective regimen sliding scale   SubCutaneous Before meals and at bedtime  dextrose 5%. 1000 milliLiter(s) (50 mL/Hr) IV Continuous <Continuous>  dextrose 50% Injectable 12.5 Gram(s) IV Push once  dextrose 50% Injectable 25 Gram(s) IV Push once  dextrose 50% Injectable 25 Gram(s) IV Push once  heparin  Injectable 5000 Unit(s) SubCutaneous every 8 hours  gabapentin 300 milliGRAM(s) Oral three times a day  loratadine 10 milliGRAM(s) Oral daily  clopidogrel Tablet 75 milliGRAM(s) Oral daily  tiotropium 18 MICROgram(s) Capsule 1 Capsule(s) Inhalation daily  amLODIPine   Tablet 5 milliGRAM(s) Oral daily  NIFEdipine XL 60 milliGRAM(s) Oral daily  furosemide   Injectable 40 milliGRAM(s) IV Push two times a day  pantoprazole    Tablet 40 milliGRAM(s) Oral before breakfast  levothyroxine 100 MICROGram(s) Oral daily  oxybutynin 5 milliGRAM(s) Oral two times a day  insulin glargine Injectable (LANTUS) 10 Unit(s) SubCutaneous at bedtime  azithromycin   Tablet 500 milliGRAM(s) Oral daily  predniSONE   Tablet 40 milliGRAM(s) Oral daily  cefTRIAXone   IVPB 1 Gram(s) IV Intermittent every 24 hours    MEDICATIONS  (PRN):  dextrose Gel 1 Dose(s) Oral once PRN Blood Glucose LESS THAN 70 milliGRAM(s)/deciliter  glucagon  Injectable 1 milliGRAM(s) IntraMuscular once PRN Glucose LESS THAN 70 milligrams/deciliter  ALBUTerol/ipratropium for Nebulization 3 milliLiter(s) Nebulizer every 6 hours PRN Shortness of Breath and/or Wheezing  acetaminophen   Tablet. 650 milliGRAM(s) Oral every 6 hours PRN mild to moderate pain      LABS:                        12.6   12.35 )-----------( 291      ( 04 Sep 2017 18:31 )             40.7         146<H>  |  106  |  22  ----------------------------<  406<H>  4.7   |  20<L>  |  1.27    Ca    8.3<L>      04 Sep 2017 21:19    TPro  7.7  /  Alb  3.7  /  TBili  < 0.2<L>  /  DBili  x   /  AST  60<H>  /  ALT  33  /  AlkPhos  101      PT/INR - ( 04 Sep 2017 21:19 )   PT: 10.6 SEC;   INR: 0.95          PTT - ( 04 Sep 2017 21:19 )  PTT:27.7 SEC      Venous Blood Gas:   @ 21:19  7.32/47/56/23/88.0  VBG Lactate: 3.9  Venous Blood Gas:   @ 18:31  7.26/44/53/19/84.6  VBG Lactate: 6.7      MICROBIOLOGY: none    RADIOLOGY:  [X] Reviewed and interpreted by me  CXR: minimal bilateral pleural effusions    EKG: Sinus tachycardia, Rate 113, , QTc 460. Normal axis. No ST changes or pathological Q waves.

## 2017-09-05 NOTE — H&P ADULT - PROBLEM SELECTOR PLAN 4
-Patient with elevated CK, no increase in troponin or CK-MB, no CP or EKG findings indicative of ischemia  -Will follow up repeat set of cardiac enzymes, monitor for symptoms -Patient with elevated CK, no increase in troponin or CK-MB, no CP or EKG findings indicative of ischemia  -Previous records reviewed, patient had isolated elevated CK in 2015  -Given no CP, EKG changes, will not trend enzymes unless patient develops symptoms

## 2017-09-05 NOTE — H&P ADULT - ASSESSMENT
44 year-old F with PMH as above who presented to the hospital with worsening shortness of breath and cough productive of yellow sputum, potentially secondary to bacterial vs viral URI vs PNA vs CHF vs asthma exacerbation vs COPD vs pulmonary edema. Patient also with hyperglycemia in the setting of prednisone use.

## 2017-09-05 NOTE — H&P ADULT - NSHPREVIEWOFSYSTEMS_GEN_ALL_CORE
REVIEW OF SYSTEMS    General: Denies fevers/chills    Skin/Breast: Denies new rashes, lesions  	  Ophthalmologic: Negative  	  ENMT: Negative	    Respiratory and Thorax: +Cough, productive. +Shortness of breath now improved.  	  Cardiovascular: Denies CP, palpitations	    Gastrointestinal: Denies abdominal pain, n/v	    Genitourinary: Negative	    Musculoskeletal: +LE edema, worsening	    Neurological: Denies headache, dizziness	    Psychiatric: Negative	    Hematology/Lymphatics: Negative	    Endocrine: Negative	    Allergic/Immunologic: Negative

## 2017-09-05 NOTE — H&P ADULT - PROBLEM SELECTOR PLAN 1
-Patient with gradually worsening shortness of breath, now improved s/p Bipap and Lasix administration  -Will begin PRN duonebs, continue azithromycin started as outpatient  -CXR with clear lungs. If patient is febrile and/or blood cultures positive, will obtain CT chest  -Continue Bipap for now, patient much improved  -Follow up blood cultures  -Elevated lactate, improved on repeat labs. Repeat in AM, hold fluids given concern for CHF and edema -Patient with gradually worsening shortness of breath, now improved s/p Bipap and Lasix administration  -Will begin PRN duonebs, continue azithromycin started as outpatient  -CXR with clear lungs. However, given cough with productive sputum, elevated lactate, and subjective chills prior to presentation with shortness of breath, will treat empirically for CAP with azithromycin and ceftriaxone  -Continue Bipap for now, patient much improved  -Follow up blood cultures

## 2017-09-05 NOTE — H&P ADULT - NSHPLABSRESULTS_GEN_ALL_CORE
12.6   12.35 )-----------( 291      ( 04 Sep 2017 18:31 )             40.7   09-04    146<H>  |  106  |  22  ----------------------------<  406<H>  4.7   |  20<L>  |  1.27    Ca    8.3<L>      04 Sep 2017 21:19    TPro  7.7  /  Alb  3.7  /  TBili  < 0.2<L>  /  DBili  x   /  AST  60<H>  /  ALT  33  /  AlkPhos  101  09-04    CARDIAC MARKERS ( 04 Sep 2017 18:31 )  x     / < 0.06 ng/mL / 433 u/L / 2.47 ng/mL / x    CXR 9/4/17: Clear lungs    EKG: Sinus tachycardia, no signs of ischemia    Blood Cultures 9/4/17: Pending

## 2017-09-05 NOTE — CONSULT NOTE ADULT - ATTENDING COMMENTS
patient seen and examined as above, Hx copd , presents with cough and   sob, tachypnea, placed on bi level, labs and cxr noted. wheezing on the left  getting nebs, continue bilevel, f/u abg, pt does not require icu level care.

## 2017-09-05 NOTE — H&P ADULT - PROBLEM SELECTOR PLAN 2
-Patient with elevated blood glucose in the setting of outpatient prednisone use, s/p insulin 6u twice  -Will administer 10u Lantus tonight  -C/w ISS, FS  -Consistent carbohydrate diet, no snacks

## 2017-09-05 NOTE — PROGRESS NOTE ADULT - SUBJECTIVE AND OBJECTIVE BOX
CHIEF COMPLAINT:    Interval Events:    REVIEW OF SYSTEMS:  Constitutional:   Eyes:  ENT:  CV:  Resp:  GI:  :  MSK:  Integumentary:  Neurological:  Psychiatric:  Endocrine:  Hematologic/Lymphatic:  Allergic/Immunologic:  [ ] All other systems negative  [ ] Unable to assess ROS because ________    OBJECTIVE:  ICU Vital Signs Last 24 Hrs  T(C): 36.9 (05 Sep 2017 05:47), Max: 37.1 (04 Sep 2017 18:00)  T(F): 98.4 (05 Sep 2017 05:47), Max: 98.8 (04 Sep 2017 18:00)  HR: 85 (05 Sep 2017 08:06) (77 - 110)  BP: 146/93 (05 Sep 2017 05:47) (136/88 - 164/88)  BP(mean): --  ABP: --  ABP(mean): --  RR: 20 (05 Sep 2017 05:47) (12 - 24)  SpO2: 96% (05 Sep 2017 08:06) (96% - 100%)        CAPILLARY BLOOD GLUCOSE  206 (05 Sep 2017 00:38)          PHYSICAL EXAM:  General:   HEENT:   Lymph Nodes:  Neck:   Respiratory:   Cardiovascular:   Abdomen:   Extremities:   Skin:   Neurological:  Psychiatry:    HOSPITAL MEDICATIONS:  MEDICATIONS  (STANDING):  insulin lispro (HumaLOG) corrective regimen sliding scale   SubCutaneous Before meals and at bedtime  dextrose 5%. 1000 milliLiter(s) (50 mL/Hr) IV Continuous <Continuous>  dextrose 50% Injectable 12.5 Gram(s) IV Push once  dextrose 50% Injectable 25 Gram(s) IV Push once  dextrose 50% Injectable 25 Gram(s) IV Push once  heparin  Injectable 5000 Unit(s) SubCutaneous every 8 hours  gabapentin 300 milliGRAM(s) Oral three times a day  loratadine 10 milliGRAM(s) Oral daily  clopidogrel Tablet 75 milliGRAM(s) Oral daily  tiotropium 18 MICROgram(s) Capsule 1 Capsule(s) Inhalation daily  amLODIPine   Tablet 5 milliGRAM(s) Oral daily  NIFEdipine XL 60 milliGRAM(s) Oral daily  furosemide   Injectable 40 milliGRAM(s) IV Push two times a day  pantoprazole    Tablet 40 milliGRAM(s) Oral before breakfast  levothyroxine 100 MICROGram(s) Oral daily  oxybutynin 5 milliGRAM(s) Oral two times a day  insulin glargine Injectable (LANTUS) 10 Unit(s) SubCutaneous at bedtime  azithromycin   Tablet 500 milliGRAM(s) Oral daily  predniSONE   Tablet 40 milliGRAM(s) Oral daily  cefTRIAXone   IVPB 1 Gram(s) IV Intermittent every 24 hours  potassium acid phosphate/sodium acid phosphate tablet (K-PHOS No. 2) 1 Tablet(s) Oral three times a day with meals    MEDICATIONS  (PRN):  dextrose Gel 1 Dose(s) Oral once PRN Blood Glucose LESS THAN 70 milliGRAM(s)/deciliter  glucagon  Injectable 1 milliGRAM(s) IntraMuscular once PRN Glucose LESS THAN 70 milligrams/deciliter  ALBUTerol/ipratropium for Nebulization 3 milliLiter(s) Nebulizer every 6 hours PRN Shortness of Breath and/or Wheezing  acetaminophen   Tablet. 650 milliGRAM(s) Oral every 6 hours PRN mild to moderate pain      LABS:                        11.1   11.96 )-----------( 260      ( 05 Sep 2017 05:40 )             35.6     09-05    146<H>  |  107  |  18  ----------------------------<  188<H>  4.2   |  27  |  0.99    Ca    8.3<L>      05 Sep 2017 05:40  Phos  1.7     09-05  Mg     2.1     09-05    TPro  7.7  /  Alb  3.7  /  TBili  < 0.2<L>  /  DBili  x   /  AST  60<H>  /  ALT  33  /  AlkPhos  101  09-04    PT/INR - ( 04 Sep 2017 21:19 )   PT: 10.6 SEC;   INR: 0.95          PTT - ( 04 Sep 2017 21:19 )  PTT:27.7 SEC      Venous Blood Gas:  09-04 @ 21:19  7.32/47/56/23/88.0  VBG Lactate: 3.9  Venous Blood Gas:  09-04 @ 18:31  7.26/44/53/19/84.6  VBG Lactate: 6.7      MICROBIOLOGY:     RADIOLOGY:  [ ] Reviewed and interpreted by me    PULMONARY FUNCTION TESTS:    EKG: CHIEF COMPLAINT:     Interval Events:    REVIEW OF SYSTEMS:  Constitutional:   Eyes:  ENT:  CV:  Resp:  GI:  :  MSK:  Integumentary:  Neurological:  Psychiatric:  Endocrine:  Hematologic/Lymphatic:  Allergic/Immunologic:  [ ] All other systems negative  [ ] Unable to assess ROS because ________    OBJECTIVE:  ICU Vital Signs Last 24 Hrs  T(C): 36.9 (05 Sep 2017 05:47), Max: 37.1 (04 Sep 2017 18:00)  T(F): 98.4 (05 Sep 2017 05:47), Max: 98.8 (04 Sep 2017 18:00)  HR: 85 (05 Sep 2017 08:06) (77 - 110)  BP: 146/93 (05 Sep 2017 05:47) (136/88 - 164/88)  BP(mean): --  ABP: --  ABP(mean): --  RR: 20 (05 Sep 2017 05:47) (12 - 24)  SpO2: 96% (05 Sep 2017 08:06) (96% - 100%)        CAPILLARY BLOOD GLUCOSE  206 (05 Sep 2017 00:38)          PHYSICAL EXAM:  General:   HEENT:   Lymph Nodes:  Neck:   Respiratory:   Cardiovascular:   Abdomen:   Extremities:   Skin:   Neurological:  Psychiatry:    HOSPITAL MEDICATIONS:  MEDICATIONS  (STANDING):  insulin lispro (HumaLOG) corrective regimen sliding scale   SubCutaneous Before meals and at bedtime  dextrose 5%. 1000 milliLiter(s) (50 mL/Hr) IV Continuous <Continuous>  dextrose 50% Injectable 12.5 Gram(s) IV Push once  dextrose 50% Injectable 25 Gram(s) IV Push once  dextrose 50% Injectable 25 Gram(s) IV Push once  heparin  Injectable 5000 Unit(s) SubCutaneous every 8 hours  gabapentin 300 milliGRAM(s) Oral three times a day  loratadine 10 milliGRAM(s) Oral daily  clopidogrel Tablet 75 milliGRAM(s) Oral daily  tiotropium 18 MICROgram(s) Capsule 1 Capsule(s) Inhalation daily  amLODIPine   Tablet 5 milliGRAM(s) Oral daily  NIFEdipine XL 60 milliGRAM(s) Oral daily  furosemide   Injectable 40 milliGRAM(s) IV Push two times a day  pantoprazole    Tablet 40 milliGRAM(s) Oral before breakfast  levothyroxine 100 MICROGram(s) Oral daily  oxybutynin 5 milliGRAM(s) Oral two times a day  insulin glargine Injectable (LANTUS) 10 Unit(s) SubCutaneous at bedtime  azithromycin   Tablet 500 milliGRAM(s) Oral daily  predniSONE   Tablet 40 milliGRAM(s) Oral daily  cefTRIAXone   IVPB 1 Gram(s) IV Intermittent every 24 hours  potassium acid phosphate/sodium acid phosphate tablet (K-PHOS No. 2) 1 Tablet(s) Oral three times a day with meals    MEDICATIONS  (PRN):  dextrose Gel 1 Dose(s) Oral once PRN Blood Glucose LESS THAN 70 milliGRAM(s)/deciliter  glucagon  Injectable 1 milliGRAM(s) IntraMuscular once PRN Glucose LESS THAN 70 milligrams/deciliter  ALBUTerol/ipratropium for Nebulization 3 milliLiter(s) Nebulizer every 6 hours PRN Shortness of Breath and/or Wheezing  acetaminophen   Tablet. 650 milliGRAM(s) Oral every 6 hours PRN mild to moderate pain      LABS:                        11.1   11.96 )-----------( 260      ( 05 Sep 2017 05:40 )             35.6     09-05    146<H>  |  107  |  18  ----------------------------<  188<H>  4.2   |  27  |  0.99    Ca    8.3<L>      05 Sep 2017 05:40  Phos  1.7     09-05  Mg     2.1     09-05    TPro  7.7  /  Alb  3.7  /  TBili  < 0.2<L>  /  DBili  x   /  AST  60<H>  /  ALT  33  /  AlkPhos  101  09-04    PT/INR - ( 04 Sep 2017 21:19 )   PT: 10.6 SEC;   INR: 0.95          PTT - ( 04 Sep 2017 21:19 )  PTT:27.7 SEC      Venous Blood Gas:  09-04 @ 21:19  7.32/47/56/23/88.0  VBG Lactate: 3.9  Venous Blood Gas:  09-04 @ 18:31  7.26/44/53/19/84.6  VBG Lactate: 6.7      MICROBIOLOGY:     RADIOLOGY:  [ ] Reviewed and interpreted by me    PULMONARY FUNCTION TESTS:    EKG:

## 2017-09-05 NOTE — H&P ADULT - HISTORY OF PRESENT ILLNESS
61 year-old F with PMH HTN, asthma, current active smoker, HLD, RA, and CVA who presented to the ED with shortness of breath and cough productive of yellow sputum. The patient reports that for the majority of the summer she was experiencing cough and was diagnosed with sinus infections, receiving azithromycin and prednisone. She states that over the past month her legs began to become more swollen. She denies fevers/chills, nausea/vomiting, abdominal pain, and chest pain. She endorses shortness of breath, now improved, and cough productive of yellow, thick sputum.    En route to the hospital, the patient was given two duoneb treatments and her SpO2 improved from the high 80's to the high 90's. In the ED, vital signs were: T: 98.8, HR: 110, BP: 143/80, RR: 24, SpO2: 98% on supplemental O2. The patient was placed on Bipap, given Lasix 40mg IV x1, and Insulin 6u x2. The patient was evaluated by the MICU and rejected.    Patient now appears improved, speaking in full sentences and tolerating Bipap well.

## 2017-09-05 NOTE — CONSULT NOTE ADULT - ASSESSMENT
61 year-old F with PMH HTN, asthma, current active smoker, HLD, RA, and CVA who presented to the ED with shortness of breath and cough productive of yellow sputum.    Neuro: pt A&O, no issues    Skin: no issues    Pulm: patient is currently tolerating BiPAP. Not a candidate for MICU at this time. Likely acute bronchitis episode. Continue with nebs PRN, Spioriva, and add Prednisone. Consider ABG if respiratory status worsens    CV: BNP 200s, low No suspicion for cardiac etiology at this time based on exam, imaging, or labwork. C/w antihypertensives    GI: no acute issues    Renal: mildly elevated creatinine, continue to monitor    ID: C/w Azithromycin/Ceftriaxone for CAP coverage, f/u blood cx    Endo: BG elevated. No known h/o DM. SSI and A1C    DVT PPX: SQH

## 2017-09-05 NOTE — H&P ADULT - PROBLEM SELECTOR PLAN 5
-C/w duonebs PRN for wheezing, azithromycin  -Patient with extensive history of smoking, consider workup for COPD in AM or as outpatient  -C/w Bipap for now

## 2017-09-05 NOTE — PROGRESS NOTE ADULT - ATTENDING COMMENTS
complex medical hx, longstanding RA, chronic prednisone, active smoker, almost certain CHRIS, h/o increasing sob, LE edema, nasal gtt.  tx as outpt with zpack, pred increase to 40 from baseline 10-15, on home lasix bid. recent le dopplers, didn't get the results.  being diuresed since admission, feeling better.  reports less edema. creat improved  still ant b lines on sono  no wheezing. no sx/ sx of infection.  -cont diuresis  -d/c abx  -decrease pred to baseline dose  -needs TTE. likely PH, pt with multiple risk factors.

## 2017-09-05 NOTE — H&P ADULT - NSHPPHYSICALEXAM_GEN_ALL_CORE
PHYSICAL EXAM:    Constitutional: NAD, sitting in bed speaking in full sentences    Eyes: PERRL    ENMT: No obvious lesions    Neck: Supple    Breasts: Not examined    Back: Nontender to palpation    Respiratory: +Rhonchi bilaterally    Cardiovascular: No m/g/r    Gastrointestinal: Nontender, obese, soft    Genitourinary: Not examined    Rectal: Deferred    Extremities: +3 pitting edema b/l LE    Neurological: AAOx3, follows commands, sensation grossly intact bilaterally    Skin: Warm    Lymph Nodes: No LAD    Musculoskeletal: No atrophy    Psychiatric: Mood and affect appropriate to clinical condition

## 2017-09-05 NOTE — PHYSICAL THERAPY INITIAL EVALUATION ADULT - PERTINENT HX OF CURRENT PROBLEM, REHAB EVAL
61 year-old F with PMH HTN, asthma, current active smoker, HLD, RA, and CVA who presented to the ED with shortness of breath and cough productive of yellow sputum. The patient reports that for the majority of the summer she was experiencing cough and was diagnosed with sinus infections, receiving azithromycin and prednisone.

## 2017-09-06 ENCOUNTER — TRANSCRIPTION ENCOUNTER (OUTPATIENT)
Age: 62
End: 2017-09-06

## 2017-09-06 LAB
BUN SERPL-MCNC: 22 MG/DL — SIGNIFICANT CHANGE UP (ref 7–23)
CALCIUM SERPL-MCNC: 9.7 MG/DL — SIGNIFICANT CHANGE UP (ref 8.4–10.5)
CHLORIDE SERPL-SCNC: 102 MMOL/L — SIGNIFICANT CHANGE UP (ref 98–107)
CO2 SERPL-SCNC: 29 MMOL/L — SIGNIFICANT CHANGE UP (ref 22–31)
CREAT SERPL-MCNC: 1.14 MG/DL — SIGNIFICANT CHANGE UP (ref 0.5–1.3)
GLUCOSE SERPL-MCNC: 191 MG/DL — HIGH (ref 70–99)
HCT VFR BLD CALC: 40.4 % — SIGNIFICANT CHANGE UP (ref 34.5–45)
HGB BLD-MCNC: 13 G/DL — SIGNIFICANT CHANGE UP (ref 11.5–15.5)
LACTATE SERPL-SCNC: 2 MMOL/L — SIGNIFICANT CHANGE UP (ref 0.5–2)
MAGNESIUM SERPL-MCNC: 2 MG/DL — SIGNIFICANT CHANGE UP (ref 1.6–2.6)
MCHC RBC-ENTMCNC: 32.2 % — SIGNIFICANT CHANGE UP (ref 32–36)
MCHC RBC-ENTMCNC: 33.5 PG — SIGNIFICANT CHANGE UP (ref 27–34)
MCV RBC AUTO: 104.1 FL — HIGH (ref 80–100)
NRBC # FLD: 0.03 — SIGNIFICANT CHANGE UP
PHOSPHATE SERPL-MCNC: 3.9 MG/DL — SIGNIFICANT CHANGE UP (ref 2.5–4.5)
PLATELET # BLD AUTO: 313 K/UL — SIGNIFICANT CHANGE UP (ref 150–400)
PMV BLD: 9.8 FL — SIGNIFICANT CHANGE UP (ref 7–13)
POTASSIUM SERPL-MCNC: 4.6 MMOL/L — SIGNIFICANT CHANGE UP (ref 3.5–5.3)
POTASSIUM SERPL-SCNC: 4.6 MMOL/L — SIGNIFICANT CHANGE UP (ref 3.5–5.3)
RBC # BLD: 3.88 M/UL — SIGNIFICANT CHANGE UP (ref 3.8–5.2)
RBC # FLD: 16.3 % — HIGH (ref 10.3–14.5)
SODIUM SERPL-SCNC: 147 MMOL/L — HIGH (ref 135–145)
WBC # BLD: 11.36 K/UL — HIGH (ref 3.8–10.5)
WBC # FLD AUTO: 11.36 K/UL — HIGH (ref 3.8–10.5)

## 2017-09-06 PROCEDURE — 99233 SBSQ HOSP IP/OBS HIGH 50: CPT | Mod: GC

## 2017-09-06 PROCEDURE — 71250 CT THORAX DX C-: CPT | Mod: 26

## 2017-09-06 RX ORDER — BENZOCAINE AND MENTHOL 5; 1 G/100ML; G/100ML
1 LIQUID ORAL ONCE
Qty: 0 | Refills: 0 | Status: COMPLETED | OUTPATIENT
Start: 2017-09-06 | End: 2017-09-06

## 2017-09-06 RX ORDER — FUROSEMIDE 40 MG
40 TABLET ORAL DAILY
Qty: 0 | Refills: 0 | Status: DISCONTINUED | OUTPATIENT
Start: 2017-09-07 | End: 2017-09-07

## 2017-09-06 RX ADMIN — Medication 1 SPRAY(S): at 06:06

## 2017-09-06 RX ADMIN — TRAMADOL HYDROCHLORIDE 75 MILLIGRAM(S): 50 TABLET ORAL at 08:30

## 2017-09-06 RX ADMIN — TRAMADOL HYDROCHLORIDE 75 MILLIGRAM(S): 50 TABLET ORAL at 22:47

## 2017-09-06 RX ADMIN — Medication 2: at 22:49

## 2017-09-06 RX ADMIN — Medication 2: at 09:09

## 2017-09-06 RX ADMIN — Medication 1 TABLET(S): at 17:47

## 2017-09-06 RX ADMIN — Medication 1 SPRAY(S): at 17:47

## 2017-09-06 RX ADMIN — Medication 2: at 17:46

## 2017-09-06 RX ADMIN — TRAMADOL HYDROCHLORIDE 75 MILLIGRAM(S): 50 TABLET ORAL at 02:27

## 2017-09-06 RX ADMIN — TRAMADOL HYDROCHLORIDE 75 MILLIGRAM(S): 50 TABLET ORAL at 03:19

## 2017-09-06 RX ADMIN — TRAMADOL HYDROCHLORIDE 75 MILLIGRAM(S): 50 TABLET ORAL at 22:03

## 2017-09-06 RX ADMIN — Medication 3 MILLILITER(S): at 03:15

## 2017-09-06 RX ADMIN — TRAMADOL HYDROCHLORIDE 75 MILLIGRAM(S): 50 TABLET ORAL at 14:25

## 2017-09-06 RX ADMIN — Medication 5 MILLIGRAM(S): at 06:06

## 2017-09-06 RX ADMIN — Medication 10 MILLIGRAM(S): at 06:07

## 2017-09-06 RX ADMIN — Medication 5 MILLIGRAM(S): at 17:47

## 2017-09-06 RX ADMIN — INSULIN GLARGINE 10 UNIT(S): 100 INJECTION, SOLUTION SUBCUTANEOUS at 22:03

## 2017-09-06 RX ADMIN — PANTOPRAZOLE SODIUM 40 MILLIGRAM(S): 20 TABLET, DELAYED RELEASE ORAL at 07:42

## 2017-09-06 RX ADMIN — Medication 3 MILLILITER(S): at 21:18

## 2017-09-06 RX ADMIN — Medication 100 MICROGRAM(S): at 06:06

## 2017-09-06 RX ADMIN — GABAPENTIN 300 MILLIGRAM(S): 400 CAPSULE ORAL at 13:28

## 2017-09-06 RX ADMIN — GABAPENTIN 300 MILLIGRAM(S): 400 CAPSULE ORAL at 06:06

## 2017-09-06 RX ADMIN — Medication 1 TABLET(S): at 07:40

## 2017-09-06 RX ADMIN — Medication 1 TABLET(S): at 13:28

## 2017-09-06 RX ADMIN — AMLODIPINE BESYLATE 5 MILLIGRAM(S): 2.5 TABLET ORAL at 06:06

## 2017-09-06 RX ADMIN — Medication 40 MILLIGRAM(S): at 06:06

## 2017-09-06 RX ADMIN — Medication 3 MILLILITER(S): at 10:08

## 2017-09-06 RX ADMIN — GABAPENTIN 300 MILLIGRAM(S): 400 CAPSULE ORAL at 22:03

## 2017-09-06 RX ADMIN — Medication 3 MILLILITER(S): at 15:35

## 2017-09-06 RX ADMIN — LORATADINE 10 MILLIGRAM(S): 10 TABLET ORAL at 13:28

## 2017-09-06 RX ADMIN — Medication 60 MILLIGRAM(S): at 06:06

## 2017-09-06 RX ADMIN — CLOPIDOGREL BISULFATE 75 MILLIGRAM(S): 75 TABLET, FILM COATED ORAL at 13:28

## 2017-09-06 RX ADMIN — TRAMADOL HYDROCHLORIDE 75 MILLIGRAM(S): 50 TABLET ORAL at 13:27

## 2017-09-06 RX ADMIN — TRAMADOL HYDROCHLORIDE 75 MILLIGRAM(S): 50 TABLET ORAL at 07:41

## 2017-09-06 RX ADMIN — Medication 10: at 13:28

## 2017-09-06 NOTE — DISCHARGE NOTE ADULT - PATIENT PORTAL LINK FT
“You can access the [facility] Patient Portal, offered by [facility], by registering with the following website: [email or web address]”

## 2017-09-06 NOTE — DISCHARGE NOTE ADULT - MEDICATION SUMMARY - MEDICATIONS TO TAKE
I will START or STAY ON the medications listed below when I get home from the hospital:    predniSONE 10 mg oral tablet  -- 1 tab(s) by mouth once a day  -- Indication: For Rheumatoid arthritis    acetaminophen 325 mg oral tablet  -- 2 tab(s) by mouth every 6 hours, As needed, mild to moderate pain  -- Indication: For Pain    traMADol 50 mg oral tablet  -- 1.5 tab(s) by mouth every 6 hours  -- Indication: For Pain    naproxen 250 mg oral tablet  -- 1 tab(s) by mouth 2 times a day, As needed, breakthrough pain  -- Indication: For Pain    gabapentin 300 mg oral capsule  -- 1 cap(s) by mouth 3 times a day  -- Indication: For Pain    insulin lispro 100 units/mL subcutaneous solution  --  subcutaneous 4 times a day (before meals and at bedtime); 1 Unit(s) if Glucose 151 - 200  2 Unit(s) if Glucose 201 - 250  3 Unit(s) if Glucose 251 - 300  4 Unit(s) if Glucose 301 - 350  5 Unit(s) if Glucose 351 - 400  6 Unit(s) if Glucose Greater Than 400  -- Indication: For Diabetes mellitus    insulin lispro 100 units/mL subcutaneous solution  -- 4 unit(s) subcutaneous 3 times a day (before meals)  -- Indication: For Diabetes mellitus    insulin glargine  -- 10 unit(s) subcutaneous once a day (at bedtime)  -- Indication: For Diabetes mellitus    loratadine 10 mg oral tablet  -- 1 tab(s) by mouth once a day  -- Indication: For Asthma    simvastatin 20 mg oral tablet  -- 1 tab(s) by mouth once a day (at bedtime)  -- Indication: For Hyperlipidemia    clopidogrel 75 mg oral tablet  -- 1 tab(s) by mouth once a day  -- Indication: For Pulmonary hypertension    Toprol-XL 25 mg oral tablet, extended release  -- 1 tab(s) by mouth once a day  -- Indication: For HTN (hypertension)    alendronate  -- 1 tab(s) by mouth once a week  -- Indication: For Rheumatoid arthritis    albuterol 90 mcg/inh inhalation aerosol  -- 1 puff(s) inhaled every 4 hours  -- Indication: For Asthma    tiotropium 18 mcg inhalation capsule  -- 1 cap(s) inhaled once a day  -- Indication: For Asthma    ipratropium-albuterol 0.5 mg-2.5 mg/3 mLinhalation solution  -- 3 milliliter(s) inhaled every 6 hours, As Needed  -- Indication: For Asthma    NIFEdipine 60 mg oral tablet, extended release  -- 1 tab(s) by mouth once a day  -- Indication: For HTN (hypertension)    amLODIPine 5 mg oral tablet  -- 1 tab(s) by mouth once a day  -- Indication: For HTN (hypertension)    lidocaine 5% topical film  -- Apply on skin to affected area once a day  -- Indication: For Pain    furosemide 40 mg oral tablet  -- 1 tab(s) by mouth once a day  -- Indication: For HTN (hypertension)    guaiFENesin 600 mg oral tablet, extended release  -- 1 tab(s) by mouth every 12 hours  -- Indication: For Asthma    docusate sodium 100 mg oral capsule  -- 1 cap(s) by mouth 3 times a day  -- Indication: For Constipation    senna oral tablet  -- 2 tab(s) by mouth once a day (at bedtime)  -- Indication: For Constipation    fluticasone 50 mcg/inh nasal spray  -- 1 spray(s) into nose 2 times a day  -- Indication: For Asthma    sodium chloride 0.65% nasal spray  -- 2 spray(s) into nose 4 times a day, As Needed  -- Indication: For Asthma    lactobacillus acidophilus oral capsule  -- 1 tab(s) by mouth 2 times a day  -- Indication: For Need for prophylactic measure    pantoprazole 40 mg oral delayed release tablet  -- 1 tab(s) by mouth once a day (before a meal)  -- Indication: For Need for prophylactic measure    levothyroxine 100 mcg (0.1 mg) oral tablet  -- 1 tab(s) by mouth once a day  -- Indication: For Hypothyroid    oxybutynin 5 mg oral tablet  -- 1 tab(s) by mouth 2 times a day  -- Indication: For Bladder

## 2017-09-06 NOTE — DISCHARGE NOTE ADULT - MEDICATION SUMMARY - MEDICATIONS TO CHANGE
I will SWITCH the dose or number of times a day I take the medications listed below when I get home from the hospital:    naproxen 500 mg oral tablet  -- 500 milligram(s) by mouth 3 times a day, As Needed    Lasix 20 mg oral tablet  -- 20 milligram(s) by mouth 2 times a day    predniSONE  -- 40 milligram(s) by mouth once a day for 3 days, then 20mg once a day for for 3 days, then continue at 10mg once a day    albuterol CFC free 90 mcg/inh inhalation aerosol  -- 1-2 puff(s) inhaled every 4 to 6 hours, As Needed - for bronchospasm  -- For inhalation only.  It is very important that you take or use this exactly as directed.  Do not skip doses or discontinue unless directed by your doctor.  Obtain medical advice before taking any non-prescription drugs as some may affect the action of this medication.  Shake well before use.    predniSONE 20 mg oral tablet  -- 2 tab(s) by mouth once a day x 4 days  -- It is very important that you take or use this exactly as directed.  Do not skip doses or discontinue unless directed by your doctor.  Obtain medical advice before taking any non-prescription drugs as some may affect the action of this medication.  Take with food or milk.    traMADol extended release  --  by mouth

## 2017-09-06 NOTE — PROGRESS NOTE ADULT - SUBJECTIVE AND OBJECTIVE BOX
CHIEF COMPLAINT:    Interval Events:    REVIEW OF SYSTEMS:  Constitutional:   Eyes:  ENT:  CV:  Resp:  GI:  :  MSK:  Integumentary:  Neurological:  Psychiatric:  Endocrine:  Hematologic/Lymphatic:  Allergic/Immunologic:  [ ] All other systems negative  [ ] Unable to assess ROS because ________    OBJECTIVE:  ICU Vital Signs Last 24 Hrs  T(C): 37 (06 Sep 2017 06:00), Max: 37.1 (05 Sep 2017 21:30)  T(F): 98.6 (06 Sep 2017 06:00), Max: 98.7 (05 Sep 2017 21:30)  HR: 75 (06 Sep 2017 06:59) (75 - 107)  BP: 121/81 (06 Sep 2017 06:00) (121/77 - 125/74)  BP(mean): --  ABP: --  ABP(mean): --  RR: 20 (06 Sep 2017 06:00) (19 - 20)  SpO2: 95% (06 Sep 2017 06:59) (93% - 99%)        09-05 @ 07:01  -  09-06 @ 07:00  --------------------------------------------------------  IN: 350 mL / OUT: 0 mL / NET: 350 mL      CAPILLARY BLOOD GLUCOSE  187 (05 Sep 2017 22:41)          PHYSICAL EXAM:  General:   HEENT:   Lymph Nodes:  Neck:   Respiratory:   Cardiovascular:   Abdomen:   Extremities:   Skin:   Neurological:  Psychiatry:    HOSPITAL MEDICATIONS:  MEDICATIONS  (STANDING):  insulin lispro (HumaLOG) corrective regimen sliding scale   SubCutaneous Before meals and at bedtime  dextrose 5%. 1000 milliLiter(s) (50 mL/Hr) IV Continuous <Continuous>  dextrose 50% Injectable 12.5 Gram(s) IV Push once  dextrose 50% Injectable 25 Gram(s) IV Push once  dextrose 50% Injectable 25 Gram(s) IV Push once  heparin  Injectable 5000 Unit(s) SubCutaneous every 8 hours  gabapentin 300 milliGRAM(s) Oral three times a day  loratadine 10 milliGRAM(s) Oral daily  clopidogrel Tablet 75 milliGRAM(s) Oral daily  amLODIPine   Tablet 5 milliGRAM(s) Oral daily  NIFEdipine XL 60 milliGRAM(s) Oral daily  furosemide   Injectable 40 milliGRAM(s) IV Push two times a day  pantoprazole    Tablet 40 milliGRAM(s) Oral before breakfast  levothyroxine 100 MICROGram(s) Oral daily  oxybutynin 5 milliGRAM(s) Oral two times a day  insulin glargine Injectable (LANTUS) 10 Unit(s) SubCutaneous at bedtime  potassium acid phosphate/sodium acid phosphate tablet (K-PHOS No. 2) 1 Tablet(s) Oral three times a day with meals  ALBUTerol/ipratropium for Nebulization 3 milliLiter(s) Nebulizer every 6 hours  ALBUTerol    90 MICROgram(s) HFA Inhaler 1 Puff(s) Inhalation every 4 hours  tiotropium 18 MICROgram(s) Capsule 1 Capsule(s) Inhalation daily  traMADol 75 milliGRAM(s) Oral every 6 hours  predniSONE   Tablet 10 milliGRAM(s) Oral daily  fluticasone propionate 50 MICROgram(s)/spray Nasal Spray 1 Spray(s) Both Nostrils two times a day  lactobacillus acidophilus 1 Tablet(s) Oral two times a day with meals    MEDICATIONS  (PRN):  dextrose Gel 1 Dose(s) Oral once PRN Blood Glucose LESS THAN 70 milliGRAM(s)/deciliter  glucagon  Injectable 1 milliGRAM(s) IntraMuscular once PRN Glucose LESS THAN 70 milligrams/deciliter  acetaminophen   Tablet. 650 milliGRAM(s) Oral every 6 hours PRN mild to moderate pain      LABS:                 MICROBIOLOGY:       Culture - Respiratory with Gram Stain (09.05.17 @ 14:07)    Gram Stain Sputum:   NOS^No Organisms Seen  WBC^White Blood Cells  QNTY CELLS IN GRAM STAIN: RARE (1+)    Specimen Source: SPUTUM    Culture - Blood (09.04.17 @ 18:57)    Culture - Blood:   NO ORGANISMS ISOLATED  NO ORGANISMS ISOLATED AT 24 HOURS    Specimen Source: BLOOD VENOUS    Culture - Blood (09.04.17 @ 18:57)    Culture - Blood:   NO ORGANISMS ISOLATED  NO ORGANISMS ISOLATED AT 24 HOURS    Specimen Source: BLOOD PERIPHERAL CHIEF COMPLAINT: Patient is a 61y old  Female who presents with a chief complaint of Shortness of breath (05 Sep 2017 00:21)    Interval Events: none overnight      REVIEW OF SYSTEMS:  CV: denies  Resp: COREAS  [x] All other systems negative  [ ] Unable to assess ROS because ________      OBJECTIVE:  ICU Vital Signs Last 24 Hrs  T(C): 37 (06 Sep 2017 06:00), Max: 37.1 (05 Sep 2017 21:30)  T(F): 98.6 (06 Sep 2017 06:00), Max: 98.7 (05 Sep 2017 21:30)  HR: 75 (06 Sep 2017 06:59) (75 - 107)  BP: 121/81 (06 Sep 2017 06:00) (121/77 - 125/74)  BP(mean): --  ABP: --  ABP(mean): --  RR: 20 (06 Sep 2017 06:00) (19 - 20)  SpO2: 95% (06 Sep 2017 06:59) (93% - 99%)        09-05 @ 07:01  -  09-06 @ 07:00  --------------------------------------------------------  IN: 350 mL / OUT: 0 mL / NET: 350 mL      CAPILLARY BLOOD GLUCOSE  187 (05 Sep 2017 22:41)        HOSPITAL MEDICATIONS:  MEDICATIONS  (STANDING):  insulin lispro (HumaLOG) corrective regimen sliding scale   SubCutaneous Before meals and at bedtime  dextrose 5%. 1000 milliLiter(s) (50 mL/Hr) IV Continuous <Continuous>  dextrose 50% Injectable 12.5 Gram(s) IV Push once  dextrose 50% Injectable 25 Gram(s) IV Push once  dextrose 50% Injectable 25 Gram(s) IV Push once  heparin  Injectable 5000 Unit(s) SubCutaneous every 8 hours  gabapentin 300 milliGRAM(s) Oral three times a day  loratadine 10 milliGRAM(s) Oral daily  clopidogrel Tablet 75 milliGRAM(s) Oral daily  amLODIPine   Tablet 5 milliGRAM(s) Oral daily  NIFEdipine XL 60 milliGRAM(s) Oral daily  furosemide   Injectable 40 milliGRAM(s) IV Push two times a day  pantoprazole    Tablet 40 milliGRAM(s) Oral before breakfast  levothyroxine 100 MICROGram(s) Oral daily  oxybutynin 5 milliGRAM(s) Oral two times a day  insulin glargine Injectable (LANTUS) 10 Unit(s) SubCutaneous at bedtime  potassium acid phosphate/sodium acid phosphate tablet (K-PHOS No. 2) 1 Tablet(s) Oral three times a day with meals  ALBUTerol/ipratropium for Nebulization 3 milliLiter(s) Nebulizer every 6 hours  ALBUTerol    90 MICROgram(s) HFA Inhaler 1 Puff(s) Inhalation every 4 hours  tiotropium 18 MICROgram(s) Capsule 1 Capsule(s) Inhalation daily  traMADol 75 milliGRAM(s) Oral every 6 hours  predniSONE   Tablet 10 milliGRAM(s) Oral daily  fluticasone propionate 50 MICROgram(s)/spray Nasal Spray 1 Spray(s) Both Nostrils two times a day  lactobacillus acidophilus 1 Tablet(s) Oral two times a day with meals    MEDICATIONS  (PRN):  dextrose Gel 1 Dose(s) Oral once PRN Blood Glucose LESS THAN 70 milliGRAM(s)/deciliter  glucagon  Injectable 1 milliGRAM(s) IntraMuscular once PRN Glucose LESS THAN 70 milligrams/deciliter  acetaminophen   Tablet. 650 milliGRAM(s) Oral every 6 hours PRN mild to moderate pain      LABS:                 MICROBIOLOGY:       Culture - Respiratory with Gram Stain (09.05.17 @ 14:07)    Gram Stain Sputum:   NOS^No Organisms Seen  WBC^White Blood Cells  QNTY CELLS IN GRAM STAIN: RARE (1+)    Specimen Source: SPUTUM    Culture - Blood (09.04.17 @ 18:57)    Culture - Blood:   NO ORGANISMS ISOLATED  NO ORGANISMS ISOLATED AT 24 HOURS    Specimen Source: BLOOD VENOUS    Culture - Blood (09.04.17 @ 18:57)    Culture - Blood:   NO ORGANISMS ISOLATED  NO ORGANISMS ISOLATED AT 24 HOURS    Specimen Source: BLOOD PERIPHERAL

## 2017-09-06 NOTE — DISCHARGE NOTE ADULT - PLAN OF CARE
Continue follow up with rheumatologist for further treatments and biologics. Continue with supplemental oxygen as needed.  You completed antibiotics while in the hospital.  Continue with inhalers and medications as directed.  You need a repeat CT chest noncontrast within 6-8 weeks to confirm resolution of pneumonia (before that won't show much) - may follow up with Dr. Lisker or pulmonary clinic. Your a1c is 8.9 management Please continue with all medications as directed.  Continue with supplemental oxygen as needed.  Follow up with Dr. Lisker within 1-2 weeks of discharge from rehab. Your a1c is 8.9  Continue medications as directed.  Consistent carbohydrate diet.

## 2017-09-06 NOTE — DISCHARGE NOTE ADULT - HOSPITAL COURSE
61 year-old F with PMH HTN, asthma, current active smoker, HLD, RA, and CVA who presented to the ED with shortness of breath and cough productive of yellow sputum. The patient reports that for the majority of the summer she was experiencing cough and was diagnosed with sinus infections, receiving azithromycin and prednisone. She states that over the past month her legs began to become more swollen. She denies fevers/chills, nausea/vomiting, abdominal pain, and chest pain. She endorses shortness of breath, now improved, and cough productive of yellow, thick sputum.  Pt found with KARIME pna, tx with levaquin.  Diruesed with resolution of SAM.  Also with moderate pulmonary htn, VQ scan negative for chronic PEs, doppler lower legs negative.  PT recs rehab.    dispo: to rehab

## 2017-09-06 NOTE — DISCHARGE NOTE ADULT - CARE PROVIDERS DIRECT ADDRESSES
[email or web address]

## 2017-09-06 NOTE — DISCHARGE NOTE ADULT - MEDICATION SUMMARY - MEDICATIONS TO STOP TAKING
I will STOP taking the medications listed below when I get home from the hospital:    Orencia  --  intravenous    Diovan 160 mg oral tablet  -- 1 tab(s) by mouth once a day    glimepiride 1 mg oral tablet  -- 1 tab(s) by mouth once a day    levalbuterol 1.25 mg/3 mL inhalation solution  -- 3 milliliter(s) inhaled every 4 hours, As needed, shortness of breath    Levaquin 750 mg oral tablet  -- 1 tab(s) by mouth every 24 hours stop after 3 days    azithromycin 250 mg oral tablet  -- 1 cap(s) by mouth once a day x 4 days  -- Do not take dairy products, antacids, or iron preparations within one hour of this medication.  Finish all this medication unless otherwise directed by prescriber.

## 2017-09-06 NOTE — PROGRESS NOTE ADULT - PROBLEM SELECTOR PLAN 1
- would continue IV lasix at this time  - monitor I&O's  - BIPAP PRN  - TTE pending (r/o pulmHTN) - decrease IV lasix to 40mg QD  - monitor I&O's  - BIPAP PRN  - TTE pending (r/o pulmHTN)  - check CT chest noncon  - with lower leg edema, left worse than right today, would check B/L lower extremity dopplers r/o DVT

## 2017-09-06 NOTE — DISCHARGE NOTE ADULT - CARE PROVIDER_API CALL
Lisker, Gita N (MD), Medicine  Pulmonary Medicine  [address] [place] [address]  Phone: [phone]  Fax: [phone]

## 2017-09-06 NOTE — PROGRESS NOTE ADULT - ATTENDING COMMENTS
feeling better. improved edema.  -TTE  -noncontrast CT chest , renal fxn just improving  -decrease lasix dose to daily  -le dopplers

## 2017-09-06 NOTE — DISCHARGE NOTE ADULT - CARE PLAN
Principal Discharge DX:	Pulmonary hypertension  Secondary Diagnosis:	Rheumatoid arthritis  Instructions for follow-up, activity and diet:	Continue follow up with rheumatologist for further treatments and biologics.  Secondary Diagnosis:	Emphysema of lung  Instructions for follow-up, activity and diet:	Continue with supplemental oxygen as needed.  You completed antibiotics while in the hospital.  Continue with inhalers and medications as directed.  You need a repeat CT chest noncontrast within 6-8 weeks to confirm resolution of pneumonia (before that won't show much) - may follow up with Dr. Lisker or pulmonary clinic.  Secondary Diagnosis:	Diabetes mellitus  Instructions for follow-up, activity and diet:	Your a1c is 8.9 Principal Discharge DX:	Pulmonary hypertension  Goal:	management  Instructions for follow-up, activity and diet:	Please continue with all medications as directed.  Continue with supplemental oxygen as needed.  Follow up with Dr. Lisker within 1-2 weeks of discharge from rehab.  Secondary Diagnosis:	Rheumatoid arthritis  Instructions for follow-up, activity and diet:	Continue follow up with rheumatologist for further treatments and biologics.  Secondary Diagnosis:	Emphysema of lung  Instructions for follow-up, activity and diet:	Continue with supplemental oxygen as needed.  You completed antibiotics while in the hospital.  Continue with inhalers and medications as directed.  You need a repeat CT chest noncontrast within 6-8 weeks to confirm resolution of pneumonia (before that won't show much) - may follow up with Dr. Lisker or pulmonary clinic.  Secondary Diagnosis:	Diabetes mellitus  Instructions for follow-up, activity and diet:	Your a1c is 8.9  Continue medications as directed.  Consistent carbohydrate diet.

## 2017-09-07 LAB
BACTERIA SPT RESP CULT: SIGNIFICANT CHANGE UP
BUN SERPL-MCNC: 25 MG/DL — HIGH (ref 7–23)
CALCIUM SERPL-MCNC: 10.7 MG/DL — HIGH (ref 8.4–10.5)
CHLORIDE SERPL-SCNC: 101 MMOL/L — SIGNIFICANT CHANGE UP (ref 98–107)
CO2 SERPL-SCNC: 30 MMOL/L — SIGNIFICANT CHANGE UP (ref 22–31)
CREAT SERPL-MCNC: 0.99 MG/DL — SIGNIFICANT CHANGE UP (ref 0.5–1.3)
GLUCOSE SERPL-MCNC: 127 MG/DL — HIGH (ref 70–99)
HCT VFR BLD CALC: 37.5 % — SIGNIFICANT CHANGE UP (ref 34.5–45)
HGB BLD-MCNC: 12 G/DL — SIGNIFICANT CHANGE UP (ref 11.5–15.5)
MCHC RBC-ENTMCNC: 32 % — SIGNIFICANT CHANGE UP (ref 32–36)
MCHC RBC-ENTMCNC: 33.2 PG — SIGNIFICANT CHANGE UP (ref 27–34)
MCV RBC AUTO: 103.9 FL — HIGH (ref 80–100)
NRBC # FLD: 0.03 — SIGNIFICANT CHANGE UP
PLATELET # BLD AUTO: 293 K/UL — SIGNIFICANT CHANGE UP (ref 150–400)
PMV BLD: 10.1 FL — SIGNIFICANT CHANGE UP (ref 7–13)
POTASSIUM SERPL-MCNC: 3.8 MMOL/L — SIGNIFICANT CHANGE UP (ref 3.5–5.3)
POTASSIUM SERPL-SCNC: 3.8 MMOL/L — SIGNIFICANT CHANGE UP (ref 3.5–5.3)
RBC # BLD: 3.61 M/UL — LOW (ref 3.8–5.2)
RBC # FLD: 15.5 % — HIGH (ref 10.3–14.5)
SODIUM SERPL-SCNC: 144 MMOL/L — SIGNIFICANT CHANGE UP (ref 135–145)
WBC # BLD: 11.39 K/UL — HIGH (ref 3.8–10.5)
WBC # FLD AUTO: 11.39 K/UL — HIGH (ref 3.8–10.5)

## 2017-09-07 PROCEDURE — 93970 EXTREMITY STUDY: CPT | Mod: 26

## 2017-09-07 PROCEDURE — 99233 SBSQ HOSP IP/OBS HIGH 50: CPT | Mod: GC

## 2017-09-07 RX ORDER — METOPROLOL TARTRATE 50 MG
25 TABLET ORAL DAILY
Qty: 0 | Refills: 0 | Status: DISCONTINUED | OUTPATIENT
Start: 2017-09-08 | End: 2017-09-12

## 2017-09-07 RX ORDER — TRAMADOL HYDROCHLORIDE 50 MG/1
75 TABLET ORAL EVERY 6 HOURS
Qty: 0 | Refills: 0 | Status: DISCONTINUED | OUTPATIENT
Start: 2017-09-07 | End: 2017-09-12

## 2017-09-07 RX ORDER — METOPROLOL TARTRATE 50 MG
TABLET ORAL
Qty: 0 | Refills: 0 | Status: DISCONTINUED | OUTPATIENT
Start: 2017-09-07 | End: 2017-09-12

## 2017-09-07 RX ORDER — METOPROLOL TARTRATE 50 MG
25 TABLET ORAL ONCE
Qty: 0 | Refills: 0 | Status: COMPLETED | OUTPATIENT
Start: 2017-09-07 | End: 2017-09-07

## 2017-09-07 RX ORDER — LIDOCAINE 4 G/100G
1 CREAM TOPICAL DAILY
Qty: 0 | Refills: 0 | Status: DISCONTINUED | OUTPATIENT
Start: 2017-09-07 | End: 2017-09-12

## 2017-09-07 RX ORDER — FUROSEMIDE 40 MG
40 TABLET ORAL DAILY
Qty: 0 | Refills: 0 | Status: DISCONTINUED | OUTPATIENT
Start: 2017-09-07 | End: 2017-09-12

## 2017-09-07 RX ADMIN — Medication 40 MILLIGRAM(S): at 06:55

## 2017-09-07 RX ADMIN — Medication 5 MILLIGRAM(S): at 16:55

## 2017-09-07 RX ADMIN — Medication 100 MICROGRAM(S): at 06:55

## 2017-09-07 RX ADMIN — Medication 3 MILLILITER(S): at 21:52

## 2017-09-07 RX ADMIN — Medication 3 MILLILITER(S): at 16:31

## 2017-09-07 RX ADMIN — TRAMADOL HYDROCHLORIDE 75 MILLIGRAM(S): 50 TABLET ORAL at 06:54

## 2017-09-07 RX ADMIN — TRAMADOL HYDROCHLORIDE 75 MILLIGRAM(S): 50 TABLET ORAL at 07:30

## 2017-09-07 RX ADMIN — TRAMADOL HYDROCHLORIDE 75 MILLIGRAM(S): 50 TABLET ORAL at 15:05

## 2017-09-07 RX ADMIN — INSULIN GLARGINE 10 UNIT(S): 100 INJECTION, SOLUTION SUBCUTANEOUS at 22:51

## 2017-09-07 RX ADMIN — LORATADINE 10 MILLIGRAM(S): 10 TABLET ORAL at 14:13

## 2017-09-07 RX ADMIN — Medication 5 MILLIGRAM(S): at 06:54

## 2017-09-07 RX ADMIN — Medication 25 MILLIGRAM(S): at 16:54

## 2017-09-07 RX ADMIN — Medication 60 MILLIGRAM(S): at 06:55

## 2017-09-07 RX ADMIN — AMLODIPINE BESYLATE 5 MILLIGRAM(S): 2.5 TABLET ORAL at 06:55

## 2017-09-07 RX ADMIN — Medication 1 TABLET(S): at 06:53

## 2017-09-07 RX ADMIN — GABAPENTIN 300 MILLIGRAM(S): 400 CAPSULE ORAL at 21:15

## 2017-09-07 RX ADMIN — TRAMADOL HYDROCHLORIDE 75 MILLIGRAM(S): 50 TABLET ORAL at 21:15

## 2017-09-07 RX ADMIN — Medication 1 TABLET(S): at 06:54

## 2017-09-07 RX ADMIN — Medication 600 MILLIGRAM(S): at 16:55

## 2017-09-07 RX ADMIN — GABAPENTIN 300 MILLIGRAM(S): 400 CAPSULE ORAL at 14:14

## 2017-09-07 RX ADMIN — Medication 1 SPRAY(S): at 16:55

## 2017-09-07 RX ADMIN — BENZOCAINE AND MENTHOL 1 LOZENGE: 5; 1 LIQUID ORAL at 06:54

## 2017-09-07 RX ADMIN — Medication 1 TABLET(S): at 16:55

## 2017-09-07 RX ADMIN — Medication 1 TABLET(S): at 14:12

## 2017-09-07 RX ADMIN — CLOPIDOGREL BISULFATE 75 MILLIGRAM(S): 75 TABLET, FILM COATED ORAL at 14:13

## 2017-09-07 RX ADMIN — Medication 10 MILLIGRAM(S): at 06:54

## 2017-09-07 RX ADMIN — PANTOPRAZOLE SODIUM 40 MILLIGRAM(S): 20 TABLET, DELAYED RELEASE ORAL at 06:55

## 2017-09-07 RX ADMIN — Medication: at 13:15

## 2017-09-07 RX ADMIN — TRAMADOL HYDROCHLORIDE 75 MILLIGRAM(S): 50 TABLET ORAL at 21:45

## 2017-09-07 RX ADMIN — GABAPENTIN 300 MILLIGRAM(S): 400 CAPSULE ORAL at 06:54

## 2017-09-07 RX ADMIN — Medication 1 TABLET(S): at 16:54

## 2017-09-07 RX ADMIN — Medication 1 SPRAY(S): at 06:55

## 2017-09-07 RX ADMIN — TRAMADOL HYDROCHLORIDE 75 MILLIGRAM(S): 50 TABLET ORAL at 14:13

## 2017-09-07 RX ADMIN — LIDOCAINE 1 PATCH: 4 CREAM TOPICAL at 16:53

## 2017-09-07 RX ADMIN — Medication 3 MILLILITER(S): at 10:42

## 2017-09-07 RX ADMIN — Medication 4: at 09:23

## 2017-09-07 RX ADMIN — Medication 3 MILLILITER(S): at 03:58

## 2017-09-07 RX ADMIN — Medication 4: at 16:54

## 2017-09-07 RX ADMIN — Medication 4: at 22:50

## 2017-09-07 NOTE — PROGRESS NOTE ADULT - SUBJECTIVE AND OBJECTIVE BOX
CHIEF COMPLAINT:    Interval Events:    REVIEW OF SYSTEMS:  Constitutional:   Eyes:  ENT:  CV:  Resp:  GI:  :  MSK:  Integumentary:  Neurological:  Psychiatric:  Endocrine:  Hematologic/Lymphatic:  Allergic/Immunologic:  [ ] All other systems negative  [ ] Unable to assess ROS because ________    OBJECTIVE:  ICU Vital Signs Last 24 Hrs  T(C): 37 (07 Sep 2017 06:49), Max: 37 (07 Sep 2017 06:49)  T(F): 98.6 (07 Sep 2017 06:49), Max: 98.6 (07 Sep 2017 06:49)  HR: 97 (07 Sep 2017 07:31) (81 - 108)  BP: 145/82 (07 Sep 2017 06:49) (128/82 - 145/82)  BP(mean): --  ABP: --  ABP(mean): --  RR: 20 (07 Sep 2017 06:49) (20 - 20)  SpO2: 93% (07 Sep 2017 07:31) (92% - 100%)        CAPILLARY BLOOD GLUCOSE  210 (07 Sep 2017 09:03)          PHYSICAL EXAM:  General:   HEENT:   Lymph Nodes:  Neck:   Respiratory:   Cardiovascular:   Abdomen:   Extremities:   Skin:   Neurological:  Psychiatry:    HOSPITAL MEDICATIONS:  MEDICATIONS  (STANDING):  insulin lispro (HumaLOG) corrective regimen sliding scale   SubCutaneous Before meals and at bedtime  dextrose 5%. 1000 milliLiter(s) (50 mL/Hr) IV Continuous <Continuous>  dextrose 50% Injectable 12.5 Gram(s) IV Push once  dextrose 50% Injectable 25 Gram(s) IV Push once  dextrose 50% Injectable 25 Gram(s) IV Push once  heparin  Injectable 5000 Unit(s) SubCutaneous every 8 hours  gabapentin 300 milliGRAM(s) Oral three times a day  loratadine 10 milliGRAM(s) Oral daily  clopidogrel Tablet 75 milliGRAM(s) Oral daily  amLODIPine   Tablet 5 milliGRAM(s) Oral daily  NIFEdipine XL 60 milliGRAM(s) Oral daily  pantoprazole    Tablet 40 milliGRAM(s) Oral before breakfast  levothyroxine 100 MICROGram(s) Oral daily  oxybutynin 5 milliGRAM(s) Oral two times a day  insulin glargine Injectable (LANTUS) 10 Unit(s) SubCutaneous at bedtime  potassium acid phosphate/sodium acid phosphate tablet (K-PHOS No. 2) 1 Tablet(s) Oral three times a day with meals  ALBUTerol/ipratropium for Nebulization 3 milliLiter(s) Nebulizer every 6 hours  ALBUTerol    90 MICROgram(s) HFA Inhaler 1 Puff(s) Inhalation every 4 hours  tiotropium 18 MICROgram(s) Capsule 1 Capsule(s) Inhalation daily  traMADol 75 milliGRAM(s) Oral every 6 hours  predniSONE   Tablet 10 milliGRAM(s) Oral daily  fluticasone propionate 50 MICROgram(s)/spray Nasal Spray 1 Spray(s) Both Nostrils two times a day  lactobacillus acidophilus 1 Tablet(s) Oral two times a day with meals  furosemide   Injectable 40 milliGRAM(s) IV Push daily    MEDICATIONS  (PRN):  dextrose Gel 1 Dose(s) Oral once PRN Blood Glucose LESS THAN 70 milliGRAM(s)/deciliter  glucagon  Injectable 1 milliGRAM(s) IntraMuscular once PRN Glucose LESS THAN 70 milligrams/deciliter  acetaminophen   Tablet. 650 milliGRAM(s) Oral every 6 hours PRN mild to moderate pain      LABS:                        12.0   11.39 )-----------( 293      ( 07 Sep 2017 06:51 )             37.5     09-07    144  |  101  |  25<H>  ----------------------------<  127<H>  3.8   |  30  |  0.99    Ca    10.7<H>      07 Sep 2017 06:51  Phos  3.9     09-06  Mg     2.0     09-06                MICROBIOLOGY:     RADIOLOGY:  [ ] Reviewed and interpreted by me    PULMONARY FUNCTION TESTS:    EKG: CHIEF COMPLAINT:CHIEF COMPLAINT: Patient is a 61y old  Female who presents with a chief complaint of Shortness of breath (05 Sep 2017 00:21)    Interval Events: None    REVIEW OF SYSTEMS:  CV: Denies   Resp: + loose cough thick sputum, COREAS    [ x] All other systems negative  [ ] Unable to assess ROS because ________    OBJECTIVE:  ICU Vital Signs Last 24 Hrs  T(C): 37 (07 Sep 2017 06:49), Max: 37 (07 Sep 2017 06:49)  T(F): 98.6 (07 Sep 2017 06:49), Max: 98.6 (07 Sep 2017 06:49)  HR: 97 (07 Sep 2017 07:31) (81 - 108)  BP: 145/82 (07 Sep 2017 06:49) (128/82 - 145/82)  BP(mean): --  ABP: --  ABP(mean): --  RR: 20 (07 Sep 2017 06:49) (20 - 20)  SpO2: 93% (07 Sep 2017 07:31) (92% - 100%)        CAPILLARY BLOOD GLUCOSE  210 (07 Sep 2017 09:03)            HOSPITAL MEDICATIONS:  MEDICATIONS  (STANDING):  insulin lispro (HumaLOG) corrective regimen sliding scale   SubCutaneous Before meals and at bedtime  dextrose 5%. 1000 milliLiter(s) (50 mL/Hr) IV Continuous <Continuous>  dextrose 50% Injectable 12.5 Gram(s) IV Push once  dextrose 50% Injectable 25 Gram(s) IV Push once  dextrose 50% Injectable 25 Gram(s) IV Push once  heparin  Injectable 5000 Unit(s) SubCutaneous every 8 hours  gabapentin 300 milliGRAM(s) Oral three times a day  loratadine 10 milliGRAM(s) Oral daily  clopidogrel Tablet 75 milliGRAM(s) Oral daily  amLODIPine   Tablet 5 milliGRAM(s) Oral daily  NIFEdipine XL 60 milliGRAM(s) Oral daily  pantoprazole    Tablet 40 milliGRAM(s) Oral before breakfast  levothyroxine 100 MICROGram(s) Oral daily  oxybutynin 5 milliGRAM(s) Oral two times a day  insulin glargine Injectable (LANTUS) 10 Unit(s) SubCutaneous at bedtime  potassium acid phosphate/sodium acid phosphate tablet (K-PHOS No. 2) 1 Tablet(s) Oral three times a day with meals  ALBUTerol/ipratropium for Nebulization 3 milliLiter(s) Nebulizer every 6 hours  ALBUTerol    90 MICROgram(s) HFA Inhaler 1 Puff(s) Inhalation every 4 hours  tiotropium 18 MICROgram(s) Capsule 1 Capsule(s) Inhalation daily  traMADol 75 milliGRAM(s) Oral every 6 hours  predniSONE   Tablet 10 milliGRAM(s) Oral daily  fluticasone propionate 50 MICROgram(s)/spray Nasal Spray 1 Spray(s) Both Nostrils two times a day  lactobacillus acidophilus 1 Tablet(s) Oral two times a day with meals  furosemide   Injectable 40 milliGRAM(s) IV Push daily    MEDICATIONS  (PRN):  dextrose Gel 1 Dose(s) Oral once PRN Blood Glucose LESS THAN 70 milliGRAM(s)/deciliter  glucagon  Injectable 1 milliGRAM(s) IntraMuscular once PRN Glucose LESS THAN 70 milligrams/deciliter  acetaminophen   Tablet. 650 milliGRAM(s) Oral every 6 hours PRN mild to moderate pain      LABS:                        12.0   11.39 )-----------( 293      ( 07 Sep 2017 06:51 )             37.5     09-07    144  |  101  |  25<H>  ----------------------------<  127<H>  3.8   |  30  |  0.99    Ca    10.7<H>      07 Sep 2017 06:51  Phos  3.9     09-06  Mg     2.0     09-06                MICROBIOLOGY:     RADIOLOGY:  [ ] Reviewed and interpreted by me    PULMONARY FUNCTION TESTS:    EKG:

## 2017-09-07 NOTE — PROGRESS NOTE ADULT - ATTENDING COMMENTS
CT scan reviewed, KARIME ground glass, c/w CAP. has had cough. outpt zithro. emphysema.  will tx with levaquin.  still pending TTE, dopplers.  change lasix to po qd.

## 2017-09-07 NOTE — PROGRESS NOTE ADULT - PROBLEM SELECTOR PLAN 1
- change  IV lasix to po 40mg QD  - monitor I&O's  - BIPAP PRN  - TTE pending (r/o pulmHTN)  -  CT chest noncon showed KARIME ground glass opacity   - Start Levaquin po course   - Pt should have FU OP CT Scan in 6 weeks   - with lower leg edema, left worse than right today, would check B/L lower extremity dopplers r/o DVT

## 2017-09-08 LAB
BUN SERPL-MCNC: 26 MG/DL — HIGH (ref 7–23)
CALCIUM SERPL-MCNC: 10.2 MG/DL — SIGNIFICANT CHANGE UP (ref 8.4–10.5)
CHLORIDE SERPL-SCNC: 103 MMOL/L — SIGNIFICANT CHANGE UP (ref 98–107)
CO2 SERPL-SCNC: 27 MMOL/L — SIGNIFICANT CHANGE UP (ref 22–31)
CREAT SERPL-MCNC: 1.17 MG/DL — SIGNIFICANT CHANGE UP (ref 0.5–1.3)
GLUCOSE SERPL-MCNC: 149 MG/DL — HIGH (ref 70–99)
HCT VFR BLD CALC: 37.9 % — SIGNIFICANT CHANGE UP (ref 34.5–45)
HGB BLD-MCNC: 12.1 G/DL — SIGNIFICANT CHANGE UP (ref 11.5–15.5)
MCHC RBC-ENTMCNC: 31.9 % — LOW (ref 32–36)
MCHC RBC-ENTMCNC: 32.9 PG — SIGNIFICANT CHANGE UP (ref 27–34)
MCV RBC AUTO: 103 FL — HIGH (ref 80–100)
NRBC # FLD: 0.03 — SIGNIFICANT CHANGE UP
PLATELET # BLD AUTO: 297 K/UL — SIGNIFICANT CHANGE UP (ref 150–400)
PMV BLD: 9.9 FL — SIGNIFICANT CHANGE UP (ref 7–13)
POTASSIUM SERPL-MCNC: 4.3 MMOL/L — SIGNIFICANT CHANGE UP (ref 3.5–5.3)
POTASSIUM SERPL-SCNC: 4.3 MMOL/L — SIGNIFICANT CHANGE UP (ref 3.5–5.3)
RBC # BLD: 3.68 M/UL — LOW (ref 3.8–5.2)
RBC # FLD: 15.2 % — HIGH (ref 10.3–14.5)
SODIUM SERPL-SCNC: 146 MMOL/L — HIGH (ref 135–145)
WBC # BLD: 10.5 K/UL — SIGNIFICANT CHANGE UP (ref 3.8–10.5)
WBC # FLD AUTO: 10.5 K/UL — SIGNIFICANT CHANGE UP (ref 3.8–10.5)

## 2017-09-08 PROCEDURE — 93306 TTE W/DOPPLER COMPLETE: CPT | Mod: 26

## 2017-09-08 PROCEDURE — 99233 SBSQ HOSP IP/OBS HIGH 50: CPT | Mod: GC

## 2017-09-08 RX ADMIN — Medication 1 TABLET(S): at 18:08

## 2017-09-08 RX ADMIN — TRAMADOL HYDROCHLORIDE 75 MILLIGRAM(S): 50 TABLET ORAL at 10:01

## 2017-09-08 RX ADMIN — Medication 100 MICROGRAM(S): at 06:00

## 2017-09-08 RX ADMIN — TRAMADOL HYDROCHLORIDE 75 MILLIGRAM(S): 50 TABLET ORAL at 16:15

## 2017-09-08 RX ADMIN — Medication 40 MILLIGRAM(S): at 06:01

## 2017-09-08 RX ADMIN — Medication 3 MILLILITER(S): at 21:24

## 2017-09-08 RX ADMIN — Medication 600 MILLIGRAM(S): at 06:00

## 2017-09-08 RX ADMIN — Medication 600 MILLIGRAM(S): at 18:08

## 2017-09-08 RX ADMIN — Medication 3 MILLILITER(S): at 16:56

## 2017-09-08 RX ADMIN — TRAMADOL HYDROCHLORIDE 75 MILLIGRAM(S): 50 TABLET ORAL at 21:40

## 2017-09-08 RX ADMIN — LORATADINE 10 MILLIGRAM(S): 10 TABLET ORAL at 12:58

## 2017-09-08 RX ADMIN — PANTOPRAZOLE SODIUM 40 MILLIGRAM(S): 20 TABLET, DELAYED RELEASE ORAL at 06:01

## 2017-09-08 RX ADMIN — Medication 25 MILLIGRAM(S): at 06:01

## 2017-09-08 RX ADMIN — Medication 4: at 18:09

## 2017-09-08 RX ADMIN — TRAMADOL HYDROCHLORIDE 75 MILLIGRAM(S): 50 TABLET ORAL at 02:49

## 2017-09-08 RX ADMIN — Medication 10 MILLIGRAM(S): at 06:01

## 2017-09-08 RX ADMIN — TRAMADOL HYDROCHLORIDE 75 MILLIGRAM(S): 50 TABLET ORAL at 09:15

## 2017-09-08 RX ADMIN — GABAPENTIN 300 MILLIGRAM(S): 400 CAPSULE ORAL at 12:57

## 2017-09-08 RX ADMIN — AMLODIPINE BESYLATE 5 MILLIGRAM(S): 2.5 TABLET ORAL at 06:01

## 2017-09-08 RX ADMIN — INSULIN GLARGINE 10 UNIT(S): 100 INJECTION, SOLUTION SUBCUTANEOUS at 21:30

## 2017-09-08 RX ADMIN — LIDOCAINE 1 PATCH: 4 CREAM TOPICAL at 06:02

## 2017-09-08 RX ADMIN — LIDOCAINE 1 PATCH: 4 CREAM TOPICAL at 18:09

## 2017-09-08 RX ADMIN — Medication 3 MILLILITER(S): at 09:19

## 2017-09-08 RX ADMIN — TRAMADOL HYDROCHLORIDE 75 MILLIGRAM(S): 50 TABLET ORAL at 21:09

## 2017-09-08 RX ADMIN — Medication 1 SPRAY(S): at 18:09

## 2017-09-08 RX ADMIN — GABAPENTIN 300 MILLIGRAM(S): 400 CAPSULE ORAL at 06:01

## 2017-09-08 RX ADMIN — Medication 5 MILLIGRAM(S): at 18:09

## 2017-09-08 RX ADMIN — Medication 4: at 21:30

## 2017-09-08 RX ADMIN — CLOPIDOGREL BISULFATE 75 MILLIGRAM(S): 75 TABLET, FILM COATED ORAL at 12:57

## 2017-09-08 RX ADMIN — Medication 3 MILLILITER(S): at 03:13

## 2017-09-08 RX ADMIN — TRAMADOL HYDROCHLORIDE 75 MILLIGRAM(S): 50 TABLET ORAL at 15:29

## 2017-09-08 RX ADMIN — Medication 5 MILLIGRAM(S): at 06:01

## 2017-09-08 RX ADMIN — Medication 2: at 09:10

## 2017-09-08 RX ADMIN — GABAPENTIN 300 MILLIGRAM(S): 400 CAPSULE ORAL at 21:11

## 2017-09-08 RX ADMIN — Medication 1 TABLET(S): at 09:11

## 2017-09-08 RX ADMIN — Medication 6: at 12:54

## 2017-09-08 RX ADMIN — TRAMADOL HYDROCHLORIDE 75 MILLIGRAM(S): 50 TABLET ORAL at 03:19

## 2017-09-08 RX ADMIN — Medication 60 MILLIGRAM(S): at 06:01

## 2017-09-08 NOTE — PROGRESS NOTE ADULT - SUBJECTIVE AND OBJECTIVE BOX
CHIEF COMPLAINT:    Interval Events:    REVIEW OF SYSTEMS:  Constitutional:   Eyes:  ENT:  CV:  Resp:  GI:  :  MSK:  Integumentary:  Neurological:  Psychiatric:  Endocrine:  Hematologic/Lymphatic:  Allergic/Immunologic:  [ ] All other systems negative  [ ] Unable to assess ROS because ________    OBJECTIVE:  ICU Vital Signs Last 24 Hrs  T(C): 37 (08 Sep 2017 05:58), Max: 37.2 (07 Sep 2017 21:07)  T(F): 98.6 (08 Sep 2017 05:58), Max: 98.9 (07 Sep 2017 21:07)  HR: 89 (08 Sep 2017 09:22) (86 - 120)  BP: 122/83 (08 Sep 2017 05:58) (122/83 - 142/90)  BP(mean): --  ABP: --  ABP(mean): --  RR: 16 (08 Sep 2017 05:58) (16 - 20)  SpO2: 94% (08 Sep 2017 09:22) (93% - 99%)        09-07 @ 07:01  -  09-08 @ 07:00  --------------------------------------------------------  IN: 400 mL / OUT: 0 mL / NET: 400 mL      CAPILLARY BLOOD GLUCOSE  297 (08 Sep 2017 12:12)          PHYSICAL EXAM:  General:   HEENT:   Lymph Nodes:  Neck:   Respiratory:   Cardiovascular:   Abdomen:   Extremities:   Skin:   Neurological:  Psychiatry:    HOSPITAL MEDICATIONS:  MEDICATIONS  (STANDING):  insulin lispro (HumaLOG) corrective regimen sliding scale   SubCutaneous Before meals and at bedtime  dextrose 5%. 1000 milliLiter(s) (50 mL/Hr) IV Continuous <Continuous>  dextrose 50% Injectable 12.5 Gram(s) IV Push once  dextrose 50% Injectable 25 Gram(s) IV Push once  dextrose 50% Injectable 25 Gram(s) IV Push once  heparin  Injectable 5000 Unit(s) SubCutaneous every 8 hours  gabapentin 300 milliGRAM(s) Oral three times a day  loratadine 10 milliGRAM(s) Oral daily  clopidogrel Tablet 75 milliGRAM(s) Oral daily  amLODIPine   Tablet 5 milliGRAM(s) Oral daily  NIFEdipine XL 60 milliGRAM(s) Oral daily  pantoprazole    Tablet 40 milliGRAM(s) Oral before breakfast  levothyroxine 100 MICROGram(s) Oral daily  oxybutynin 5 milliGRAM(s) Oral two times a day  insulin glargine Injectable (LANTUS) 10 Unit(s) SubCutaneous at bedtime  ALBUTerol/ipratropium for Nebulization 3 milliLiter(s) Nebulizer every 6 hours  ALBUTerol    90 MICROgram(s) HFA Inhaler 1 Puff(s) Inhalation every 4 hours  tiotropium 18 MICROgram(s) Capsule 1 Capsule(s) Inhalation daily  predniSONE   Tablet 10 milliGRAM(s) Oral daily  fluticasone propionate 50 MICROgram(s)/spray Nasal Spray 1 Spray(s) Both Nostrils two times a day  lactobacillus acidophilus 1 Tablet(s) Oral two times a day with meals  furosemide    Tablet 40 milliGRAM(s) Oral daily  levoFLOXacin  Tablet 500 milliGRAM(s) Oral every 24 hours  guaiFENesin  milliGRAM(s) Oral every 12 hours  metoprolol succinate ER 25 milliGRAM(s) Oral daily  metoprolol succinate ER      traMADol 75 milliGRAM(s) Oral every 6 hours    MEDICATIONS  (PRN):  dextrose Gel 1 Dose(s) Oral once PRN Blood Glucose LESS THAN 70 milliGRAM(s)/deciliter  glucagon  Injectable 1 milliGRAM(s) IntraMuscular once PRN Glucose LESS THAN 70 milligrams/deciliter  acetaminophen   Tablet. 650 milliGRAM(s) Oral every 6 hours PRN mild to moderate pain  lidocaine   Patch 1 Patch Transdermal daily PRN neck pain  lidocaine   Patch 1 Patch Transdermal daily PRN back pain      LABS:                        12.1   10.50 )-----------( 297      ( 08 Sep 2017 07:00 )             37.9     09-08    146<H>  |  103  |  26<H>  ----------------------------<  149<H>  4.3   |  27  |  1.17    Ca    10.2      08 Sep 2017 07:00                MICROBIOLOGY:     RADIOLOGY:  [ ] Reviewed and interpreted by me    PULMONARY FUNCTION TESTS:    EKG: CHIEF COMPLAINT: SOB Cough    Interval Events: None     REVIEW OF SYSTEMS:  Resp: + loose productive cough   MSK: + neck pain/ lower back pain 2/2 RA, + LE pain in calfs   [x ] All other systems negative      OBJECTIVE:  ICU Vital Signs Last 24 Hrs  T(C): 37 (08 Sep 2017 05:58), Max: 37.2 (07 Sep 2017 21:07)  T(F): 98.6 (08 Sep 2017 05:58), Max: 98.9 (07 Sep 2017 21:07)  HR: 89 (08 Sep 2017 09:22) (86 - 120)  BP: 122/83 (08 Sep 2017 05:58) (122/83 - 142/90)  BP(mean): --  ABP: --  ABP(mean): --  RR: 16 (08 Sep 2017 05:58) (16 - 20)  SpO2: 94% (08 Sep 2017 09:22) (93% - 99%)        09-07 @ 07:01  -  09-08 @ 07:00  --------------------------------------------------------  IN: 400 mL / OUT: 0 mL / NET: 400 mL      CAPILLARY BLOOD GLUCOSE  297 (08 Sep 2017 12:12    HOSPITAL MEDICATIONS:  MEDICATIONS  (STANDING):  insulin lispro (HumaLOG) corrective regimen sliding scale   SubCutaneous Before meals and at bedtime  dextrose 5%. 1000 milliLiter(s) (50 mL/Hr) IV Continuous <Continuous>  dextrose 50% Injectable 12.5 Gram(s) IV Push once  dextrose 50% Injectable 25 Gram(s) IV Push once  dextrose 50% Injectable 25 Gram(s) IV Push once  heparin  Injectable 5000 Unit(s) SubCutaneous every 8 hours  gabapentin 300 milliGRAM(s) Oral three times a day  loratadine 10 milliGRAM(s) Oral daily  clopidogrel Tablet 75 milliGRAM(s) Oral daily  amLODIPine   Tablet 5 milliGRAM(s) Oral daily  NIFEdipine XL 60 milliGRAM(s) Oral daily  pantoprazole    Tablet 40 milliGRAM(s) Oral before breakfast  levothyroxine 100 MICROGram(s) Oral daily  oxybutynin 5 milliGRAM(s) Oral two times a day  insulin glargine Injectable (LANTUS) 10 Unit(s) SubCutaneous at bedtime  ALBUTerol/ipratropium for Nebulization 3 milliLiter(s) Nebulizer every 6 hours  ALBUTerol    90 MICROgram(s) HFA Inhaler 1 Puff(s) Inhalation every 4 hours  tiotropium 18 MICROgram(s) Capsule 1 Capsule(s) Inhalation daily  predniSONE   Tablet 10 milliGRAM(s) Oral daily  fluticasone propionate 50 MICROgram(s)/spray Nasal Spray 1 Spray(s) Both Nostrils two times a day  lactobacillus acidophilus 1 Tablet(s) Oral two times a day with meals  furosemide    Tablet 40 milliGRAM(s) Oral daily  levoFLOXacin  Tablet 500 milliGRAM(s) Oral every 24 hours  guaiFENesin  milliGRAM(s) Oral every 12 hours  metoprolol succinate ER 25 milliGRAM(s) Oral daily  metoprolol succinate ER      traMADol 75 milliGRAM(s) Oral every 6 hours    MEDICATIONS  (PRN):  dextrose Gel 1 Dose(s) Oral once PRN Blood Glucose LESS THAN 70 milliGRAM(s)/deciliter  glucagon  Injectable 1 milliGRAM(s) IntraMuscular once PRN Glucose LESS THAN 70 milligrams/deciliter  acetaminophen   Tablet. 650 milliGRAM(s) Oral every 6 hours PRN mild to moderate pain  lidocaine   Patch 1 Patch Transdermal daily PRN neck pain  lidocaine   Patch 1 Patch Transdermal daily PRN back pain      LABS:                        12.1   10.50 )-----------( 297      ( 08 Sep 2017 07:00 )             37.9     09-08    146<H>  |  103  |  26<H>  ----------------------------<  149<H>  4.3   |  27  |  1.17    Ca    10.2      08 Sep 2017 07:00                MICROBIOLOGY:     RADIOLOGY:  [ ] Reviewed and interpreted by me    PULMONARY FUNCTION TESTS:    EKG:

## 2017-09-08 NOTE — PROGRESS NOTE ADULT - PROBLEM SELECTOR PLAN 1
- change  IV lasix to po 40mg QD  - monitor I&O's  - BIPAP PRN  - TTE pending (r/o pulmHTN)  -  CT chest noncon showed KARIME ground glass opacity   - Start Levaquin po course   - Pt should have FU OP CT Scan in 6 weeks   - with lower leg edema, left worse than right today, bilat doppler negative   -

## 2017-09-08 NOTE — PROGRESS NOTE ADULT - ATTENDING COMMENTS
Pt stable on nasal cannula 2lpm  Afebrile. Hemodyn stable.  No edema, clinically euvolemic.  Negative dopplers.  TTE still pending.  On levaquin for tx of CAP, KARIME.    Pt also with know h/o CHRIS, but was noncompliant with CPAP. Needs a repeat PSG as outpt  Smoker, current until admission, evidence of emphysema on CT.    Longstanding RA, cervical spinal disease. Has been undergoing eval as outpt, planned for MRI.     Extensive d/w pt and son. PT recommending rehab, which I believe is very appropriate. They are considering it. SW and PT d/w them as well.  Will start the process for d/c to rehab

## 2017-09-09 DIAGNOSIS — E87.0 HYPEROSMOLALITY AND HYPERNATREMIA: ICD-10-CM

## 2017-09-09 LAB
BUN SERPL-MCNC: 28 MG/DL — HIGH (ref 7–23)
CALCIUM SERPL-MCNC: 9.9 MG/DL — SIGNIFICANT CHANGE UP (ref 8.4–10.5)
CHLORIDE SERPL-SCNC: 99 MMOL/L — SIGNIFICANT CHANGE UP (ref 98–107)
CO2 SERPL-SCNC: 26 MMOL/L — SIGNIFICANT CHANGE UP (ref 22–31)
CREAT SERPL-MCNC: 1.11 MG/DL — SIGNIFICANT CHANGE UP (ref 0.5–1.3)
GLUCOSE SERPL-MCNC: 151 MG/DL — HIGH (ref 70–99)
HCT VFR BLD CALC: 35.5 % — SIGNIFICANT CHANGE UP (ref 34.5–45)
HGB BLD-MCNC: 11.4 G/DL — LOW (ref 11.5–15.5)
MCHC RBC-ENTMCNC: 32.1 % — SIGNIFICANT CHANGE UP (ref 32–36)
MCHC RBC-ENTMCNC: 33.5 PG — SIGNIFICANT CHANGE UP (ref 27–34)
MCV RBC AUTO: 104.4 FL — HIGH (ref 80–100)
NRBC # FLD: 0.02 — SIGNIFICANT CHANGE UP
PLATELET # BLD AUTO: 277 K/UL — SIGNIFICANT CHANGE UP (ref 150–400)
PMV BLD: 10.1 FL — SIGNIFICANT CHANGE UP (ref 7–13)
POTASSIUM SERPL-MCNC: 4.3 MMOL/L — SIGNIFICANT CHANGE UP (ref 3.5–5.3)
POTASSIUM SERPL-SCNC: 4.3 MMOL/L — SIGNIFICANT CHANGE UP (ref 3.5–5.3)
RBC # BLD: 3.4 M/UL — LOW (ref 3.8–5.2)
RBC # FLD: 15.2 % — HIGH (ref 10.3–14.5)
SODIUM SERPL-SCNC: 143 MMOL/L — SIGNIFICANT CHANGE UP (ref 135–145)
WBC # BLD: 9.82 K/UL — SIGNIFICANT CHANGE UP (ref 3.8–10.5)
WBC # FLD AUTO: 9.82 K/UL — SIGNIFICANT CHANGE UP (ref 3.8–10.5)

## 2017-09-09 PROCEDURE — 99233 SBSQ HOSP IP/OBS HIGH 50: CPT

## 2017-09-09 RX ORDER — INSULIN LISPRO 100/ML
VIAL (ML) SUBCUTANEOUS
Qty: 0 | Refills: 0 | Status: DISCONTINUED | OUTPATIENT
Start: 2017-09-09 | End: 2017-09-12

## 2017-09-09 RX ORDER — INSULIN LISPRO 100/ML
4 VIAL (ML) SUBCUTANEOUS
Qty: 0 | Refills: 0 | Status: DISCONTINUED | OUTPATIENT
Start: 2017-09-09 | End: 2017-09-12

## 2017-09-09 RX ADMIN — HEPARIN SODIUM 5000 UNIT(S): 5000 INJECTION INTRAVENOUS; SUBCUTANEOUS at 13:39

## 2017-09-09 RX ADMIN — Medication 5 MILLIGRAM(S): at 17:43

## 2017-09-09 RX ADMIN — Medication 1 TABLET(S): at 17:43

## 2017-09-09 RX ADMIN — Medication 60 MILLIGRAM(S): at 06:00

## 2017-09-09 RX ADMIN — LORATADINE 10 MILLIGRAM(S): 10 TABLET ORAL at 12:36

## 2017-09-09 RX ADMIN — Medication 1: at 17:41

## 2017-09-09 RX ADMIN — TRAMADOL HYDROCHLORIDE 75 MILLIGRAM(S): 50 TABLET ORAL at 14:41

## 2017-09-09 RX ADMIN — Medication 4 UNIT(S): at 17:41

## 2017-09-09 RX ADMIN — PANTOPRAZOLE SODIUM 40 MILLIGRAM(S): 20 TABLET, DELAYED RELEASE ORAL at 06:00

## 2017-09-09 RX ADMIN — Medication 25 MILLIGRAM(S): at 06:00

## 2017-09-09 RX ADMIN — Medication 1 SPRAY(S): at 06:00

## 2017-09-09 RX ADMIN — TRAMADOL HYDROCHLORIDE 75 MILLIGRAM(S): 50 TABLET ORAL at 03:30

## 2017-09-09 RX ADMIN — Medication 3 MILLILITER(S): at 04:25

## 2017-09-09 RX ADMIN — Medication: at 12:35

## 2017-09-09 RX ADMIN — CLOPIDOGREL BISULFATE 75 MILLIGRAM(S): 75 TABLET, FILM COATED ORAL at 12:35

## 2017-09-09 RX ADMIN — Medication 1 SPRAY(S): at 17:44

## 2017-09-09 RX ADMIN — TRAMADOL HYDROCHLORIDE 75 MILLIGRAM(S): 50 TABLET ORAL at 09:25

## 2017-09-09 RX ADMIN — AMLODIPINE BESYLATE 5 MILLIGRAM(S): 2.5 TABLET ORAL at 06:00

## 2017-09-09 RX ADMIN — INSULIN GLARGINE 10 UNIT(S): 100 INJECTION, SOLUTION SUBCUTANEOUS at 22:46

## 2017-09-09 RX ADMIN — Medication: at 08:30

## 2017-09-09 RX ADMIN — GABAPENTIN 300 MILLIGRAM(S): 400 CAPSULE ORAL at 13:39

## 2017-09-09 RX ADMIN — Medication 100 MICROGRAM(S): at 06:00

## 2017-09-09 RX ADMIN — Medication 1: at 22:45

## 2017-09-09 RX ADMIN — Medication 600 MILLIGRAM(S): at 17:44

## 2017-09-09 RX ADMIN — TRAMADOL HYDROCHLORIDE 75 MILLIGRAM(S): 50 TABLET ORAL at 20:30

## 2017-09-09 RX ADMIN — Medication 3 MILLILITER(S): at 10:36

## 2017-09-09 RX ADMIN — Medication 3 MILLILITER(S): at 21:15

## 2017-09-09 RX ADMIN — LIDOCAINE 1 PATCH: 4 CREAM TOPICAL at 06:00

## 2017-09-09 RX ADMIN — GABAPENTIN 300 MILLIGRAM(S): 400 CAPSULE ORAL at 22:46

## 2017-09-09 RX ADMIN — Medication 40 MILLIGRAM(S): at 06:00

## 2017-09-09 RX ADMIN — Medication 3 MILLILITER(S): at 16:41

## 2017-09-09 RX ADMIN — GABAPENTIN 300 MILLIGRAM(S): 400 CAPSULE ORAL at 06:00

## 2017-09-09 RX ADMIN — TRAMADOL HYDROCHLORIDE 75 MILLIGRAM(S): 50 TABLET ORAL at 20:00

## 2017-09-09 RX ADMIN — TRAMADOL HYDROCHLORIDE 75 MILLIGRAM(S): 50 TABLET ORAL at 15:20

## 2017-09-09 RX ADMIN — LIDOCAINE 1 PATCH: 4 CREAM TOPICAL at 20:00

## 2017-09-09 RX ADMIN — TRAMADOL HYDROCHLORIDE 75 MILLIGRAM(S): 50 TABLET ORAL at 02:00

## 2017-09-09 RX ADMIN — Medication 10 MILLIGRAM(S): at 06:00

## 2017-09-09 RX ADMIN — Medication 5 MILLIGRAM(S): at 06:00

## 2017-09-09 RX ADMIN — Medication 1 TABLET(S): at 09:15

## 2017-09-09 NOTE — PROGRESS NOTE PEDS - PROBLEM SELECTOR PLAN 2
- Ha1c 8.9 %   - FS still elevate, will start Pre-meals 4 Units  and changed SS to low scale, c/w Lantus   - monitor FSs

## 2017-09-09 NOTE — PROGRESS NOTE PEDS - SUBJECTIVE AND OBJECTIVE BOX
CHIEF COMPLAINT: no new complains     Interval Events: no events overnight     REVIEW OF SYSTEMS:  Constitutional: No fevers or chills. No weight loss. No fatigue or generalized malaise.  Eyes: No itching or discharge from the eyes  ENT:  No ear discharge. No nasal congestion. No post nasal drip. No epistaxis. No throat pain. No sore throat. No difficulty swallowing.   CV: No chest pain. No palpitations. No lightheadedness or dizziness.   Resp: No dyspnea at rest. No dyspnea on exertion. No orthopnea. No wheezing. No cough. No stridor. No sputum production. No chest pain with respiration.  GI: No nausea. No vomiting. No diarrhea.  MSK: lower back pain , B/L LE aching   Integumentary: No skin lesions. No pedal edema.  Neurological: No gross motor weakness. No sensory changes.  [ x] All other systems negative      OBJECTIVE:  ICU Vital Signs Last 24 Hrs  T(C): 36.7 (09 Sep 2017 11:21), Max: 36.9 (08 Sep 2017 21:05)  T(F): 98 (09 Sep 2017 11:21), Max: 98.4 (08 Sep 2017 21:05)  HR: 95 (09 Sep 2017 11:21) (95 - 101)  BP: 115/70 (09 Sep 2017 11:21) (111/69 - 119/83)    RR: 18 (09 Sep 2017 11:21) (18 - 18)  SpO2: 95% (09 Sep 2017 11:21) (85% - 96%)        CAPILLARY BLOOD GLUCOSE  245 (08 Sep 2017 21:22)    PHYSICAL EXAM:  General: Awake, alert, oriented X 3, Morbidly obese  HEENT: Atraumatic, normocephalic. Lymph Nodes: No palpable lymphadenopathy  Neck: Short neck. No JVD. No carotid bruit.   Respiratory: Normal chest expansion. Normal percussion. Normal and equal air entry  No wheeze, rhonchi or rales.  Cardiovascular: S1 S2 normal. No murmurs, rubs or gallops.   Abdomen: Obese, Soft, non-tender, non-distended. No organomegaly.  Extremities: Warm to touch. Peripheral pulse palpable. No pedal edema.   Skin: No rashes or skin lesions  Neurological: Motor and sensory examination equal and normal in all four extremities.  Psychiatry: Appropriate mood and affect.    HOSPITAL MEDICATIONS:  MEDICATIONS  (STANDING):  insulin lispro (HumaLOG) corrective regimen sliding scale   SubCutaneous Before meals and at bedtime  dextrose 5%. 1000 milliLiter(s) (50 mL/Hr) IV Continuous <Continuous>  dextrose 50% Injectable 12.5 Gram(s) IV Push once  dextrose 50% Injectable 25 Gram(s) IV Push once  dextrose 50% Injectable 25 Gram(s) IV Push once  heparin  Injectable 5000 Unit(s) SubCutaneous every 8 hours  gabapentin 300 milliGRAM(s) Oral three times a day  loratadine 10 milliGRAM(s) Oral daily  clopidogrel Tablet 75 milliGRAM(s) Oral daily  amLODIPine   Tablet 5 milliGRAM(s) Oral daily  NIFEdipine XL 60 milliGRAM(s) Oral daily  pantoprazole    Tablet 40 milliGRAM(s) Oral before breakfast  levothyroxine 100 MICROGram(s) Oral daily  oxybutynin 5 milliGRAM(s) Oral two times a day  insulin glargine Injectable (LANTUS) 10 Unit(s) SubCutaneous at bedtime  ALBUTerol/ipratropium for Nebulization 3 milliLiter(s) Nebulizer every 6 hours  ALBUTerol    90 MICROgram(s) HFA Inhaler 1 Puff(s) Inhalation every 4 hours  tiotropium 18 MICROgram(s) Capsule 1 Capsule(s) Inhalation daily  predniSONE   Tablet 10 milliGRAM(s) Oral daily  fluticasone propionate 50 MICROgram(s)/spray Nasal Spray 1 Spray(s) Both Nostrils two times a day  lactobacillus acidophilus 1 Tablet(s) Oral two times a day with meals  furosemide    Tablet 40 milliGRAM(s) Oral daily  levoFLOXacin  Tablet 500 milliGRAM(s) Oral every 24 hours  guaiFENesin  milliGRAM(s) Oral every 12 hours  metoprolol succinate ER 25 milliGRAM(s) Oral daily  metoprolol succinate ER      traMADol 75 milliGRAM(s) Oral every 6 hours    MEDICATIONS  (PRN):  dextrose Gel 1 Dose(s) Oral once PRN Blood Glucose LESS THAN 70 milliGRAM(s)/deciliter  glucagon  Injectable 1 milliGRAM(s) IntraMuscular once PRN Glucose LESS THAN 70 milligrams/deciliter  acetaminophen   Tablet. 650 milliGRAM(s) Oral every 6 hours PRN mild to moderate pain  lidocaine   Patch 1 Patch Transdermal daily PRN neck pain  lidocaine   Patch 1 Patch Transdermal daily PRN back pain      LABS:  11.4   9.82  )-----------( 277      ( 09 Sep 2017 07:10 )             35.5     09-09    143  |  99  |  28<H>  ----------------------------<  151<H>  4.3   |  26  |  1.11    Ca    9.9      09 Sep 2017 07:10    Echo EF 74%  Normal left ventricular internal dimensions and wall thicknesses. Hyperdynamic left ventricle. The right ventricle is not well visualized; grossly normal right ventricular systolic function. Estimated right ventricular systolic pressure equals 57  mm Hg, assuming right atrial pressure equals 10 mm Hg, consistent with moderate pulmonary hypertension.

## 2017-09-10 DIAGNOSIS — R09.81 NASAL CONGESTION: ICD-10-CM

## 2017-09-10 PROCEDURE — 99233 SBSQ HOSP IP/OBS HIGH 50: CPT | Mod: GC

## 2017-09-10 RX ORDER — SODIUM CHLORIDE 0.65 %
1 AEROSOL, SPRAY (ML) NASAL EVERY 4 HOURS
Qty: 0 | Refills: 0 | Status: DISCONTINUED | OUTPATIENT
Start: 2017-09-10 | End: 2017-09-12

## 2017-09-10 RX ORDER — SENNA PLUS 8.6 MG/1
2 TABLET ORAL AT BEDTIME
Qty: 0 | Refills: 0 | Status: DISCONTINUED | OUTPATIENT
Start: 2017-09-10 | End: 2017-09-12

## 2017-09-10 RX ORDER — DOCUSATE SODIUM 100 MG
100 CAPSULE ORAL THREE TIMES A DAY
Qty: 0 | Refills: 0 | Status: DISCONTINUED | OUTPATIENT
Start: 2017-09-10 | End: 2017-09-12

## 2017-09-10 RX ADMIN — TRAMADOL HYDROCHLORIDE 75 MILLIGRAM(S): 50 TABLET ORAL at 14:37

## 2017-09-10 RX ADMIN — Medication 1 TABLET(S): at 09:34

## 2017-09-10 RX ADMIN — TRAMADOL HYDROCHLORIDE 75 MILLIGRAM(S): 50 TABLET ORAL at 09:33

## 2017-09-10 RX ADMIN — Medication 1: at 09:36

## 2017-09-10 RX ADMIN — Medication 4 UNIT(S): at 12:49

## 2017-09-10 RX ADMIN — Medication 100 MICROGRAM(S): at 06:23

## 2017-09-10 RX ADMIN — LIDOCAINE 1 PATCH: 4 CREAM TOPICAL at 22:04

## 2017-09-10 RX ADMIN — INSULIN GLARGINE 10 UNIT(S): 100 INJECTION, SOLUTION SUBCUTANEOUS at 22:36

## 2017-09-10 RX ADMIN — AMLODIPINE BESYLATE 5 MILLIGRAM(S): 2.5 TABLET ORAL at 06:23

## 2017-09-10 RX ADMIN — Medication 3 MILLILITER(S): at 10:45

## 2017-09-10 RX ADMIN — Medication 60 MILLIGRAM(S): at 06:23

## 2017-09-10 RX ADMIN — TRAMADOL HYDROCHLORIDE 75 MILLIGRAM(S): 50 TABLET ORAL at 02:45

## 2017-09-10 RX ADMIN — TRAMADOL HYDROCHLORIDE 75 MILLIGRAM(S): 50 TABLET ORAL at 14:07

## 2017-09-10 RX ADMIN — GABAPENTIN 300 MILLIGRAM(S): 400 CAPSULE ORAL at 13:02

## 2017-09-10 RX ADMIN — GABAPENTIN 300 MILLIGRAM(S): 400 CAPSULE ORAL at 22:40

## 2017-09-10 RX ADMIN — LORATADINE 10 MILLIGRAM(S): 10 TABLET ORAL at 12:49

## 2017-09-10 RX ADMIN — TRAMADOL HYDROCHLORIDE 75 MILLIGRAM(S): 50 TABLET ORAL at 02:15

## 2017-09-10 RX ADMIN — Medication 1: at 22:37

## 2017-09-10 RX ADMIN — Medication 4 UNIT(S): at 09:35

## 2017-09-10 RX ADMIN — Medication 1 SPRAY(S): at 17:02

## 2017-09-10 RX ADMIN — Medication 2: at 12:49

## 2017-09-10 RX ADMIN — Medication 3 MILLILITER(S): at 21:31

## 2017-09-10 RX ADMIN — Medication 3 MILLILITER(S): at 16:38

## 2017-09-10 RX ADMIN — Medication 100 MILLIGRAM(S): at 22:40

## 2017-09-10 RX ADMIN — PANTOPRAZOLE SODIUM 40 MILLIGRAM(S): 20 TABLET, DELAYED RELEASE ORAL at 06:22

## 2017-09-10 RX ADMIN — Medication 600 MILLIGRAM(S): at 17:02

## 2017-09-10 RX ADMIN — Medication 5 MILLIGRAM(S): at 17:02

## 2017-09-10 RX ADMIN — Medication 10 MILLIGRAM(S): at 06:23

## 2017-09-10 RX ADMIN — GABAPENTIN 300 MILLIGRAM(S): 400 CAPSULE ORAL at 06:22

## 2017-09-10 RX ADMIN — Medication 5 MILLIGRAM(S): at 06:23

## 2017-09-10 RX ADMIN — Medication 600 MILLIGRAM(S): at 06:23

## 2017-09-10 RX ADMIN — Medication 1 TABLET(S): at 17:02

## 2017-09-10 RX ADMIN — Medication 3 MILLILITER(S): at 03:18

## 2017-09-10 RX ADMIN — CLOPIDOGREL BISULFATE 75 MILLIGRAM(S): 75 TABLET, FILM COATED ORAL at 12:49

## 2017-09-10 RX ADMIN — Medication 4 UNIT(S): at 18:17

## 2017-09-10 RX ADMIN — Medication 40 MILLIGRAM(S): at 06:23

## 2017-09-10 RX ADMIN — TRAMADOL HYDROCHLORIDE 75 MILLIGRAM(S): 50 TABLET ORAL at 10:00

## 2017-09-10 RX ADMIN — Medication 100 MILLIGRAM(S): at 13:02

## 2017-09-10 RX ADMIN — LIDOCAINE 1 PATCH: 4 CREAM TOPICAL at 09:37

## 2017-09-10 RX ADMIN — Medication 25 MILLIGRAM(S): at 06:23

## 2017-09-10 RX ADMIN — TRAMADOL HYDROCHLORIDE 75 MILLIGRAM(S): 50 TABLET ORAL at 23:40

## 2017-09-10 NOTE — PROGRESS NOTE ADULT - SUBJECTIVE AND OBJECTIVE BOX
CHIEF COMPLAINT:    Interval Events:    REVIEW OF SYSTEMS:  Constitutional:   Eyes:  ENT:  CV:  Resp:  GI:  :  MSK:  Integumentary:  Neurological:  Psychiatric:  Endocrine:  Hematologic/Lymphatic:  Allergic/Immunologic:  [ ] All other systems negative  [ ] Unable to assess ROS because ________    OBJECTIVE:  ICU Vital Signs Last 24 Hrs  T(C): 36.9 (10 Sep 2017 06:18), Max: 37.1 (09 Sep 2017 21:31)  T(F): 98.5 (10 Sep 2017 06:18), Max: 98.7 (09 Sep 2017 21:31)  HR: 93 (10 Sep 2017 08:05) (90 - 105)  BP: 117/87 (10 Sep 2017 06:18) (115/70 - 124/62)  BP(mean): --  ABP: --  ABP(mean): --  RR: 18 (10 Sep 2017 06:18) (18 - 18)  SpO2: 92% (10 Sep 2017 08:05) (92% - 99%)        09-09 @ 07:01  -  09-10 @ 07:00  --------------------------------------------------------  IN: 0 mL / OUT: 1500 mL / NET: -1500 mL      CAPILLARY BLOOD GLUCOSE  192 (10 Sep 2017 08:27)          PHYSICAL EXAM:  General:   HEENT:   Lymph Nodes:  Neck:   Respiratory:   Cardiovascular:   Abdomen:   Extremities:   Skin:   Neurological:  Psychiatry:    HOSPITAL MEDICATIONS:  MEDICATIONS  (STANDING):  dextrose 5%. 1000 milliLiter(s) (50 mL/Hr) IV Continuous <Continuous>  dextrose 50% Injectable 12.5 Gram(s) IV Push once  dextrose 50% Injectable 25 Gram(s) IV Push once  dextrose 50% Injectable 25 Gram(s) IV Push once  heparin  Injectable 5000 Unit(s) SubCutaneous every 8 hours  gabapentin 300 milliGRAM(s) Oral three times a day  loratadine 10 milliGRAM(s) Oral daily  clopidogrel Tablet 75 milliGRAM(s) Oral daily  amLODIPine   Tablet 5 milliGRAM(s) Oral daily  NIFEdipine XL 60 milliGRAM(s) Oral daily  pantoprazole    Tablet 40 milliGRAM(s) Oral before breakfast  levothyroxine 100 MICROGram(s) Oral daily  oxybutynin 5 milliGRAM(s) Oral two times a day  insulin glargine Injectable (LANTUS) 10 Unit(s) SubCutaneous at bedtime  ALBUTerol/ipratropium for Nebulization 3 milliLiter(s) Nebulizer every 6 hours  ALBUTerol    90 MICROgram(s) HFA Inhaler 1 Puff(s) Inhalation every 4 hours  tiotropium 18 MICROgram(s) Capsule 1 Capsule(s) Inhalation daily  predniSONE   Tablet 10 milliGRAM(s) Oral daily  fluticasone propionate 50 MICROgram(s)/spray Nasal Spray 1 Spray(s) Both Nostrils two times a day  lactobacillus acidophilus 1 Tablet(s) Oral two times a day with meals  furosemide    Tablet 40 milliGRAM(s) Oral daily  levoFLOXacin  Tablet 500 milliGRAM(s) Oral every 24 hours  guaiFENesin  milliGRAM(s) Oral every 12 hours  metoprolol succinate ER 25 milliGRAM(s) Oral daily  metoprolol succinate ER      traMADol 75 milliGRAM(s) Oral every 6 hours  insulin lispro (HumaLOG) corrective regimen sliding scale   SubCutaneous Before meals and at bedtime  insulin lispro Injectable (HumaLOG) 4 Unit(s) SubCutaneous three times a day before meals    MEDICATIONS  (PRN):  dextrose Gel 1 Dose(s) Oral once PRN Blood Glucose LESS THAN 70 milliGRAM(s)/deciliter  glucagon  Injectable 1 milliGRAM(s) IntraMuscular once PRN Glucose LESS THAN 70 milligrams/deciliter  acetaminophen   Tablet. 650 milliGRAM(s) Oral every 6 hours PRN mild to moderate pain  lidocaine   Patch 1 Patch Transdermal daily PRN neck pain  lidocaine   Patch 1 Patch Transdermal daily PRN back pain      LABS:                        11.4   9.82  )-----------( 277      ( 09 Sep 2017 07:10 )             35.5     09-09    143  |  99  |  28<H>  ----------------------------<  151<H>  4.3   |  26  |  1.11    Ca    9.9      09 Sep 2017 07:10                MICROBIOLOGY:     RADIOLOGY:  [ ] Reviewed and interpreted by me    PULMONARY FUNCTION TESTS:    EKG: CHIEF COMPLAINT: Patient is a 61y old  Female who presents with a chief complaint of Shortness of breath (06 Sep 2017 15:30)    Interval Events: No acute events overnight    REVIEW OF SYSTEMS:  Constitutional:   Eyes:  ENT:  CV: Denies chest pain  Resp: c/o productive cough, with yellow sputum  GI:  :  MSK: c/o left leg pain  Integumentary:  Neurological:  Psychiatric:  Endocrine:  Hematologic/Lymphatic:  Allergic/Immunologic:  [x ] All other systems negative  [ ] Unable to assess ROS because ________    OBJECTIVE:  ICU Vital Signs Last 24 Hrs  T(C): 36.9 (10 Sep 2017 06:18), Max: 37.1 (09 Sep 2017 21:31)  T(F): 98.5 (10 Sep 2017 06:18), Max: 98.7 (09 Sep 2017 21:31)  HR: 93 (10 Sep 2017 08:05) (90 - 105)  BP: 117/87 (10 Sep 2017 06:18) (115/70 - 124/62)  BP(mean): --  ABP: --  ABP(mean): --  RR: 18 (10 Sep 2017 06:18) (18 - 18)  SpO2: 92% (10 Sep 2017 08:05) (92% - 99%)        09-09 @ 07:01  -  09-10 @ 07:00  --------------------------------------------------------  IN: 0 mL / OUT: 1500 mL / NET: -1500 mL      CAPILLARY BLOOD GLUCOSE  192 (10 Sep 2017 08:27)      HOSPITAL MEDICATIONS:  MEDICATIONS  (STANDING):  dextrose 5%. 1000 milliLiter(s) (50 mL/Hr) IV Continuous <Continuous>  dextrose 50% Injectable 12.5 Gram(s) IV Push once  dextrose 50% Injectable 25 Gram(s) IV Push once  dextrose 50% Injectable 25 Gram(s) IV Push once  heparin  Injectable 5000 Unit(s) SubCutaneous every 8 hours  gabapentin 300 milliGRAM(s) Oral three times a day  loratadine 10 milliGRAM(s) Oral daily  clopidogrel Tablet 75 milliGRAM(s) Oral daily  amLODIPine   Tablet 5 milliGRAM(s) Oral daily  NIFEdipine XL 60 milliGRAM(s) Oral daily  pantoprazole    Tablet 40 milliGRAM(s) Oral before breakfast  levothyroxine 100 MICROGram(s) Oral daily  oxybutynin 5 milliGRAM(s) Oral two times a day  insulin glargine Injectable (LANTUS) 10 Unit(s) SubCutaneous at bedtime  ALBUTerol/ipratropium for Nebulization 3 milliLiter(s) Nebulizer every 6 hours  ALBUTerol    90 MICROgram(s) HFA Inhaler 1 Puff(s) Inhalation every 4 hours  tiotropium 18 MICROgram(s) Capsule 1 Capsule(s) Inhalation daily  predniSONE   Tablet 10 milliGRAM(s) Oral daily  fluticasone propionate 50 MICROgram(s)/spray Nasal Spray 1 Spray(s) Both Nostrils two times a day  lactobacillus acidophilus 1 Tablet(s) Oral two times a day with meals  furosemide    Tablet 40 milliGRAM(s) Oral daily  levoFLOXacin  Tablet 500 milliGRAM(s) Oral every 24 hours  guaiFENesin  milliGRAM(s) Oral every 12 hours  metoprolol succinate ER 25 milliGRAM(s) Oral daily  metoprolol succinate ER      traMADol 75 milliGRAM(s) Oral every 6 hours  insulin lispro (HumaLOG) corrective regimen sliding scale   SubCutaneous Before meals and at bedtime  insulin lispro Injectable (HumaLOG) 4 Unit(s) SubCutaneous three times a day before meals    MEDICATIONS  (PRN):  dextrose Gel 1 Dose(s) Oral once PRN Blood Glucose LESS THAN 70 milliGRAM(s)/deciliter  glucagon  Injectable 1 milliGRAM(s) IntraMuscular once PRN Glucose LESS THAN 70 milligrams/deciliter  acetaminophen   Tablet. 650 milliGRAM(s) Oral every 6 hours PRN mild to moderate pain  lidocaine   Patch 1 Patch Transdermal daily PRN neck pain  lidocaine   Patch 1 Patch Transdermal daily PRN back pain      LABS:                        11.4   9.82  )-----------( 277      ( 09 Sep 2017 07:10 )             35.5     09-09    143  |  99  |  28<H>  ----------------------------<  151<H>  4.3   |  26  |  1.11    Ca    9.9      09 Sep 2017 07:10                MICROBIOLOGY:     RADIOLOGY:  [ ] Reviewed and interpreted by me    PULMONARY FUNCTION TESTS:    EKG:

## 2017-09-10 NOTE — PROGRESS NOTE ADULT - PROBLEM SELECTOR PLAN 1
- continue  IV lasix to po 40mg QD  - monitor I&O's  - BIPAP PRN  - TTE - showed moderate pulmonary hypertension.  -  CT chest noncon showed KARIME ground glass opacity   - On Levaquin PO   - Pt should have FU OP CT Scan in 6 weeks   - Lower leg edema, left >right, bilat doppler negative

## 2017-09-11 LAB
BUN SERPL-MCNC: 27 MG/DL — HIGH (ref 7–23)
CALCIUM SERPL-MCNC: 9.3 MG/DL — SIGNIFICANT CHANGE UP (ref 8.4–10.5)
CHLORIDE SERPL-SCNC: 100 MMOL/L — SIGNIFICANT CHANGE UP (ref 98–107)
CO2 SERPL-SCNC: 29 MMOL/L — SIGNIFICANT CHANGE UP (ref 22–31)
CREAT SERPL-MCNC: 1.11 MG/DL — SIGNIFICANT CHANGE UP (ref 0.5–1.3)
GLUCOSE SERPL-MCNC: 151 MG/DL — HIGH (ref 70–99)
HCT VFR BLD CALC: 35.1 % — SIGNIFICANT CHANGE UP (ref 34.5–45)
HGB BLD-MCNC: 10.9 G/DL — LOW (ref 11.5–15.5)
MCHC RBC-ENTMCNC: 31.1 % — LOW (ref 32–36)
MCHC RBC-ENTMCNC: 32.2 PG — SIGNIFICANT CHANGE UP (ref 27–34)
MCV RBC AUTO: 103.5 FL — HIGH (ref 80–100)
NRBC # FLD: 0 — SIGNIFICANT CHANGE UP
PLATELET # BLD AUTO: 293 K/UL — SIGNIFICANT CHANGE UP (ref 150–400)
PMV BLD: 9.9 FL — SIGNIFICANT CHANGE UP (ref 7–13)
POTASSIUM SERPL-MCNC: 3.9 MMOL/L — SIGNIFICANT CHANGE UP (ref 3.5–5.3)
POTASSIUM SERPL-SCNC: 3.9 MMOL/L — SIGNIFICANT CHANGE UP (ref 3.5–5.3)
RBC # BLD: 3.39 M/UL — LOW (ref 3.8–5.2)
RBC # FLD: 14.8 % — HIGH (ref 10.3–14.5)
SODIUM SERPL-SCNC: 143 MMOL/L — SIGNIFICANT CHANGE UP (ref 135–145)
WBC # BLD: 8.25 K/UL — SIGNIFICANT CHANGE UP (ref 3.8–10.5)
WBC # FLD AUTO: 8.25 K/UL — SIGNIFICANT CHANGE UP (ref 3.8–10.5)

## 2017-09-11 PROCEDURE — 99233 SBSQ HOSP IP/OBS HIGH 50: CPT | Mod: GC

## 2017-09-11 PROCEDURE — 78582 LUNG VENTILAT&PERFUS IMAGING: CPT | Mod: 26,GC

## 2017-09-11 PROCEDURE — 71010: CPT | Mod: 26

## 2017-09-11 RX ADMIN — Medication 4 UNIT(S): at 17:28

## 2017-09-11 RX ADMIN — PANTOPRAZOLE SODIUM 40 MILLIGRAM(S): 20 TABLET, DELAYED RELEASE ORAL at 05:17

## 2017-09-11 RX ADMIN — TRAMADOL HYDROCHLORIDE 75 MILLIGRAM(S): 50 TABLET ORAL at 18:00

## 2017-09-11 RX ADMIN — TRAMADOL HYDROCHLORIDE 75 MILLIGRAM(S): 50 TABLET ORAL at 13:50

## 2017-09-11 RX ADMIN — Medication 4 UNIT(S): at 13:16

## 2017-09-11 RX ADMIN — LIDOCAINE 1 PATCH: 4 CREAM TOPICAL at 13:01

## 2017-09-11 RX ADMIN — Medication 3 MILLILITER(S): at 04:08

## 2017-09-11 RX ADMIN — Medication 5: at 13:16

## 2017-09-11 RX ADMIN — Medication 1 TABLET(S): at 17:26

## 2017-09-11 RX ADMIN — GABAPENTIN 300 MILLIGRAM(S): 400 CAPSULE ORAL at 22:18

## 2017-09-11 RX ADMIN — TRAMADOL HYDROCHLORIDE 75 MILLIGRAM(S): 50 TABLET ORAL at 13:17

## 2017-09-11 RX ADMIN — TRAMADOL HYDROCHLORIDE 75 MILLIGRAM(S): 50 TABLET ORAL at 17:26

## 2017-09-11 RX ADMIN — Medication 3 MILLILITER(S): at 16:32

## 2017-09-11 RX ADMIN — CLOPIDOGREL BISULFATE 75 MILLIGRAM(S): 75 TABLET, FILM COATED ORAL at 13:17

## 2017-09-11 RX ADMIN — Medication 3 MILLILITER(S): at 10:22

## 2017-09-11 RX ADMIN — TRAMADOL HYDROCHLORIDE 75 MILLIGRAM(S): 50 TABLET ORAL at 22:19

## 2017-09-11 RX ADMIN — Medication 60 MILLIGRAM(S): at 05:15

## 2017-09-11 RX ADMIN — Medication 5 MILLIGRAM(S): at 05:15

## 2017-09-11 RX ADMIN — Medication 100 MILLIGRAM(S): at 13:17

## 2017-09-11 RX ADMIN — LORATADINE 10 MILLIGRAM(S): 10 TABLET ORAL at 13:17

## 2017-09-11 RX ADMIN — Medication 1 TABLET(S): at 09:06

## 2017-09-11 RX ADMIN — Medication 100 MICROGRAM(S): at 05:17

## 2017-09-11 RX ADMIN — Medication 4 UNIT(S): at 09:06

## 2017-09-11 RX ADMIN — TRAMADOL HYDROCHLORIDE 75 MILLIGRAM(S): 50 TABLET ORAL at 05:50

## 2017-09-11 RX ADMIN — Medication 2: at 09:06

## 2017-09-11 RX ADMIN — Medication 25 MILLIGRAM(S): at 05:17

## 2017-09-11 RX ADMIN — GABAPENTIN 300 MILLIGRAM(S): 400 CAPSULE ORAL at 05:15

## 2017-09-11 RX ADMIN — Medication 600 MILLIGRAM(S): at 17:26

## 2017-09-11 RX ADMIN — Medication 1 SPRAY(S): at 05:14

## 2017-09-11 RX ADMIN — Medication 3 MILLILITER(S): at 21:46

## 2017-09-11 RX ADMIN — SENNA PLUS 2 TABLET(S): 8.6 TABLET ORAL at 22:18

## 2017-09-11 RX ADMIN — TRAMADOL HYDROCHLORIDE 75 MILLIGRAM(S): 50 TABLET ORAL at 00:25

## 2017-09-11 RX ADMIN — Medication 10 MILLIGRAM(S): at 05:15

## 2017-09-11 RX ADMIN — Medication 5 MILLIGRAM(S): at 17:26

## 2017-09-11 RX ADMIN — Medication 600 MILLIGRAM(S): at 07:22

## 2017-09-11 RX ADMIN — Medication 100 MILLIGRAM(S): at 05:14

## 2017-09-11 RX ADMIN — TRAMADOL HYDROCHLORIDE 75 MILLIGRAM(S): 50 TABLET ORAL at 05:07

## 2017-09-11 RX ADMIN — GABAPENTIN 300 MILLIGRAM(S): 400 CAPSULE ORAL at 13:17

## 2017-09-11 RX ADMIN — Medication 100 MILLIGRAM(S): at 22:18

## 2017-09-11 RX ADMIN — AMLODIPINE BESYLATE 5 MILLIGRAM(S): 2.5 TABLET ORAL at 05:14

## 2017-09-11 RX ADMIN — Medication 40 MILLIGRAM(S): at 05:17

## 2017-09-11 RX ADMIN — INSULIN GLARGINE 10 UNIT(S): 100 INJECTION, SOLUTION SUBCUTANEOUS at 22:33

## 2017-09-11 NOTE — DIETITIAN INITIAL EVALUATION ADULT. - PROBLEM SELECTOR PLAN 4
-Patient with elevated CK, no increase in troponin or CK-MB, no CP or EKG findings indicative of ischemia  -Previous records reviewed, patient had isolated elevated CK in 2015  -Given no CP, EKG changes, will not trend enzymes unless patient develops symptoms

## 2017-09-11 NOTE — PROGRESS NOTE ADULT - SUBJECTIVE AND OBJECTIVE BOX
CHIEF COMPLAINT:    Interval Events:    REVIEW OF SYSTEMS:  Constitutional:   Eyes:  ENT:  CV:  Resp:  GI:  :  MSK:  Integumentary:  Neurological:  Psychiatric:  Endocrine:  Hematologic/Lymphatic:  Allergic/Immunologic:  [ ] All other systems negative  [ ] Unable to assess ROS because ________    OBJECTIVE:  ICU Vital Signs Last 24 Hrs  T(C): 36.7 (11 Sep 2017 05:04), Max: 37.1 (10 Sep 2017 22:00)  T(F): 98 (11 Sep 2017 05:04), Max: 98.8 (10 Sep 2017 22:00)  HR: 101 (11 Sep 2017 07:14) (95 - 111)  BP: 122/76 (11 Sep 2017 05:04) (122/76 - 128/81)  BP(mean): 90 (10 Sep 2017 13:27) (90 - 90)  ABP: --  ABP(mean): --  RR: 18 (11 Sep 2017 05:04) (18 - 18)  SpO2: 94% (11 Sep 2017 07:14) (93% - 97%)        09-10 @ 07:01  -  09-11 @ 07:00  --------------------------------------------------------  IN: 886 mL / OUT: 600 mL / NET: 286 mL      CAPILLARY BLOOD GLUCOSE  245 (11 Sep 2017 08:31)          PHYSICAL EXAM:  General:   HEENT:   Lymph Nodes:  Neck:   Respiratory:   Cardiovascular:   Abdomen:   Extremities:   Skin:   Neurological:  Psychiatry:    HOSPITAL MEDICATIONS:  MEDICATIONS  (STANDING):  dextrose 5%. 1000 milliLiter(s) (50 mL/Hr) IV Continuous <Continuous>  dextrose 50% Injectable 12.5 Gram(s) IV Push once  dextrose 50% Injectable 25 Gram(s) IV Push once  dextrose 50% Injectable 25 Gram(s) IV Push once  heparin  Injectable 5000 Unit(s) SubCutaneous every 8 hours  gabapentin 300 milliGRAM(s) Oral three times a day  loratadine 10 milliGRAM(s) Oral daily  clopidogrel Tablet 75 milliGRAM(s) Oral daily  amLODIPine   Tablet 5 milliGRAM(s) Oral daily  NIFEdipine XL 60 milliGRAM(s) Oral daily  pantoprazole    Tablet 40 milliGRAM(s) Oral before breakfast  levothyroxine 100 MICROGram(s) Oral daily  oxybutynin 5 milliGRAM(s) Oral two times a day  insulin glargine Injectable (LANTUS) 10 Unit(s) SubCutaneous at bedtime  ALBUTerol/ipratropium for Nebulization 3 milliLiter(s) Nebulizer every 6 hours  ALBUTerol    90 MICROgram(s) HFA Inhaler 1 Puff(s) Inhalation every 4 hours  tiotropium 18 MICROgram(s) Capsule 1 Capsule(s) Inhalation daily  predniSONE   Tablet 10 milliGRAM(s) Oral daily  fluticasone propionate 50 MICROgram(s)/spray Nasal Spray 1 Spray(s) Both Nostrils two times a day  lactobacillus acidophilus 1 Tablet(s) Oral two times a day with meals  furosemide    Tablet 40 milliGRAM(s) Oral daily  levoFLOXacin  Tablet 500 milliGRAM(s) Oral every 24 hours  guaiFENesin  milliGRAM(s) Oral every 12 hours  metoprolol succinate ER 25 milliGRAM(s) Oral daily  metoprolol succinate ER      traMADol 75 milliGRAM(s) Oral every 6 hours  insulin lispro (HumaLOG) corrective regimen sliding scale   SubCutaneous Before meals and at bedtime  insulin lispro Injectable (HumaLOG) 4 Unit(s) SubCutaneous three times a day before meals  docusate sodium 100 milliGRAM(s) Oral three times a day  senna 2 Tablet(s) Oral at bedtime    MEDICATIONS  (PRN):  dextrose Gel 1 Dose(s) Oral once PRN Blood Glucose LESS THAN 70 milliGRAM(s)/deciliter  glucagon  Injectable 1 milliGRAM(s) IntraMuscular once PRN Glucose LESS THAN 70 milligrams/deciliter  acetaminophen   Tablet. 650 milliGRAM(s) Oral every 6 hours PRN mild to moderate pain  lidocaine   Patch 1 Patch Transdermal daily PRN neck pain  lidocaine   Patch 1 Patch Transdermal daily PRN back pain  sodium chloride 0.65% Nasal 1 Spray(s) Both Nostrils every 4 hours PRN Nasal Congestion      LABS:                        10.9   8.25  )-----------( 293      ( 11 Sep 2017 06:00 )             35.1     09-11    143  |  100  |  27<H>  ----------------------------<  151<H>  3.9   |  29  |  1.11    Ca    9.3      11 Sep 2017 06:00                MICROBIOLOGY:     RADIOLOGY:  [ ] Reviewed and interpreted by me    PULMONARY FUNCTION TESTS:    EKG: CHIEF COMPLAINT: Patient is a 61y old  Female who presents with a chief complaint of Shortness of breath (06 Sep 2017 15:30)    Interval Events: none overnight      REVIEW OF SYSTEMS:  CV: denies  Resp: denies  MSK: side pain 2/2 coughing  [x] All other systems negative  [ ] Unable to assess ROS because ________      OBJECTIVE:  ICU Vital Signs Last 24 Hrs  T(C): 36.7 (11 Sep 2017 05:04), Max: 37.1 (10 Sep 2017 22:00)  T(F): 98 (11 Sep 2017 05:04), Max: 98.8 (10 Sep 2017 22:00)  HR: 101 (11 Sep 2017 07:14) (95 - 111)  BP: 122/76 (11 Sep 2017 05:04) (122/76 - 128/81)  BP(mean): 90 (10 Sep 2017 13:27) (90 - 90)  ABP: --  ABP(mean): --  RR: 18 (11 Sep 2017 05:04) (18 - 18)  SpO2: 94% (11 Sep 2017 07:14) (93% - 97%)        09-10 @ 07:01  -  09-11 @ 07:00  --------------------------------------------------------  IN: 886 mL / OUT: 600 mL / NET: 286 mL      CAPILLARY BLOOD GLUCOSE  245 (11 Sep 2017 08:31)      HOSPITAL MEDICATIONS:  MEDICATIONS  (STANDING):  dextrose 5%. 1000 milliLiter(s) (50 mL/Hr) IV Continuous <Continuous>  dextrose 50% Injectable 12.5 Gram(s) IV Push once  dextrose 50% Injectable 25 Gram(s) IV Push once  dextrose 50% Injectable 25 Gram(s) IV Push once  heparin  Injectable 5000 Unit(s) SubCutaneous every 8 hours  gabapentin 300 milliGRAM(s) Oral three times a day  loratadine 10 milliGRAM(s) Oral daily  clopidogrel Tablet 75 milliGRAM(s) Oral daily  amLODIPine   Tablet 5 milliGRAM(s) Oral daily  NIFEdipine XL 60 milliGRAM(s) Oral daily  pantoprazole    Tablet 40 milliGRAM(s) Oral before breakfast  levothyroxine 100 MICROGram(s) Oral daily  oxybutynin 5 milliGRAM(s) Oral two times a day  insulin glargine Injectable (LANTUS) 10 Unit(s) SubCutaneous at bedtime  ALBUTerol/ipratropium for Nebulization 3 milliLiter(s) Nebulizer every 6 hours  ALBUTerol    90 MICROgram(s) HFA Inhaler 1 Puff(s) Inhalation every 4 hours  tiotropium 18 MICROgram(s) Capsule 1 Capsule(s) Inhalation daily  predniSONE   Tablet 10 milliGRAM(s) Oral daily  fluticasone propionate 50 MICROgram(s)/spray Nasal Spray 1 Spray(s) Both Nostrils two times a day  lactobacillus acidophilus 1 Tablet(s) Oral two times a day with meals  furosemide    Tablet 40 milliGRAM(s) Oral daily  levoFLOXacin  Tablet 500 milliGRAM(s) Oral every 24 hours  guaiFENesin  milliGRAM(s) Oral every 12 hours  metoprolol succinate ER 25 milliGRAM(s) Oral daily  metoprolol succinate ER      traMADol 75 milliGRAM(s) Oral every 6 hours  insulin lispro (HumaLOG) corrective regimen sliding scale   SubCutaneous Before meals and at bedtime  insulin lispro Injectable (HumaLOG) 4 Unit(s) SubCutaneous three times a day before meals  docusate sodium 100 milliGRAM(s) Oral three times a day  senna 2 Tablet(s) Oral at bedtime    MEDICATIONS  (PRN):  dextrose Gel 1 Dose(s) Oral once PRN Blood Glucose LESS THAN 70 milliGRAM(s)/deciliter  glucagon  Injectable 1 milliGRAM(s) IntraMuscular once PRN Glucose LESS THAN 70 milligrams/deciliter  acetaminophen   Tablet. 650 milliGRAM(s) Oral every 6 hours PRN mild to moderate pain  lidocaine   Patch 1 Patch Transdermal daily PRN neck pain  lidocaine   Patch 1 Patch Transdermal daily PRN back pain  sodium chloride 0.65% Nasal 1 Spray(s) Both Nostrils every 4 hours PRN Nasal Congestion      LABS:                        10.9   8.25  )-----------( 293      ( 11 Sep 2017 06:00 )             35.1     09-11    143  |  100  |  27<H>  ----------------------------<  151<H>  3.9   |  29  |  1.11    Ca    9.3      11 Sep 2017 06:00

## 2017-09-11 NOTE — DIETITIAN INITIAL EVALUATION ADULT. - PERTINENT MEDS FT
Levaquin, Norvasc, Lasix, Toprol XL, Plavix, Lantus, Humalog, Colace, Protonix DR Senshon, Synthroid, Prednisone

## 2017-09-11 NOTE — DIETITIAN INITIAL EVALUATION ADULT. - PROBLEM SELECTOR PLAN 1
-Patient with gradually worsening shortness of breath, now improved s/p Bipap and Lasix administration  -Will begin PRN duonebs, continue azithromycin started as outpatient  -CXR with clear lungs. However, given cough with productive sputum, elevated lactate, and subjective chills prior to presentation with shortness of breath, will treat empirically for CAP with azithromycin and ceftriaxone  -Continue Bipap for now, patient much improved  -Follow up blood cultures

## 2017-09-11 NOTE — PROGRESS NOTE ADULT - PROBLEM SELECTOR PLAN 1
- cont PO lasix 40mg  - monitor I&O's  - BIPAP PRN  - TTE - showed moderate pulmonary hypertension  -  CT chest noncon showed KARIME ground glass opacity   - On Levaquin PO, last day today  - will check VQ scan for completion of pulm htn workup  - Pt should have FU OP CT Scan in 6 weeks   - Lower leg edema, left >right, bilat doppler negative

## 2017-09-11 NOTE — CHART NOTE - NSCHARTNOTEFT_GEN_A_CORE
Pt with dx of COPD and pulmonary hypertension.  Pt with room air at rest saturation of 84%.  Pt will require home oxygen.
Spoke with pt's son Norris Rush at pt's request. Updated re: pt's condition and plan of care.

## 2017-09-11 NOTE — PROGRESS NOTE ADULT - ATTENDING COMMENTS
Acute on chronic resp failure with hypoxia, admitted with cor pulmonale - improved with diuresis, and KARIME CAP. completing levaquin  Moderate pulmonary htn, longstanding RA, known CHRIS but noncompliant with CPAP, and current smoker with evidence of emphysema on CT chest, c/w group 1 and 3. No evidence of significant left heart disease. neg LE dopplers. V/Q for completion.  D/C planning.   PT recommending rehab, pt is non-committal, hesitant to leave the hospital.  Will plan for discharge tomorrow -- to either rehab, or to home if pt refuses rehab.  She will need home oxygen (O2 sat is 87% RA at rest, 95% on 2LPM. pt is non-ambulatory)  She will need outpt split study to reinstate CPAP  Outpt f/u with pulmonary for further tx of her COPD, pulm HTN, CHRIS, Acute on chronic resp failure with hypoxia, admitted with cor pulmonale - improved with diuresis, and KARIME CAP. completing levaquin  Moderate pulmonary htn, longstanding RA, known CHRIS but noncompliant with CPAP, and current smoker with evidence of emphysema on CT chest, c/w group 1 and 3. No evidence of significant left heart disease. neg LE dopplers. V/Q for completion.  D/C planning.   PT recommending rehab, pt is non-committal, hesitant to leave the hospital.  Will plan for discharge tomorrow -- to either rehab, or to home if pt refuses rehab.  She will need home oxygen (O2 sat is 87% RA at rest, 95% on 2LPM. pt is non-ambulatory)  She will need outpt split study to reinstate CPAP  Outpt f/u with pulmonary for further tx of her COPD, pulm HTN, CHRIS, and f/u CT chest in 6 weeks

## 2017-09-12 VITALS
RESPIRATION RATE: 18 BRPM | HEART RATE: 98 BPM | SYSTOLIC BLOOD PRESSURE: 136 MMHG | DIASTOLIC BLOOD PRESSURE: 92 MMHG | OXYGEN SATURATION: 95 %

## 2017-09-12 PROCEDURE — 99238 HOSP IP/OBS DSCHRG MGMT 30/<: CPT

## 2017-09-12 RX ORDER — ALBUTEROL 90 UG/1
1 AEROSOL, METERED ORAL
Qty: 0 | Refills: 0 | COMMUNITY
Start: 2017-09-12

## 2017-09-12 RX ORDER — GABAPENTIN 400 MG/1
3 CAPSULE ORAL
Qty: 0 | Refills: 0 | COMMUNITY

## 2017-09-12 RX ORDER — TIOTROPIUM BROMIDE 18 UG/1
1 CAPSULE ORAL; RESPIRATORY (INHALATION)
Qty: 0 | Refills: 0 | COMMUNITY
Start: 2017-09-12

## 2017-09-12 RX ORDER — INSULIN LISPRO 100/ML
0 VIAL (ML) SUBCUTANEOUS
Qty: 0 | Refills: 0 | COMMUNITY
Start: 2017-09-12

## 2017-09-12 RX ORDER — OXYBUTYNIN CHLORIDE 5 MG
1 TABLET ORAL
Qty: 0 | Refills: 0 | COMMUNITY
Start: 2017-09-12

## 2017-09-12 RX ORDER — SODIUM CHLORIDE 0.65 %
2 AEROSOL, SPRAY (ML) NASAL
Qty: 0 | Refills: 0 | COMMUNITY
Start: 2017-09-12

## 2017-09-12 RX ORDER — FLUTICASONE PROPIONATE 50 MCG
1 SPRAY, SUSPENSION NASAL
Qty: 0 | Refills: 0 | COMMUNITY
Start: 2017-09-12

## 2017-09-12 RX ORDER — LACTOBACILLUS ACIDOPHILUS 100MM CELL
1 CAPSULE ORAL
Qty: 0 | Refills: 0 | COMMUNITY
Start: 2017-09-12

## 2017-09-12 RX ORDER — ACETAMINOPHEN 500 MG
2 TABLET ORAL
Qty: 0 | Refills: 0 | COMMUNITY
Start: 2017-09-12

## 2017-09-12 RX ORDER — NIFEDIPINE 30 MG
1 TABLET, EXTENDED RELEASE 24 HR ORAL
Qty: 0 | Refills: 0 | COMMUNITY
Start: 2017-09-12

## 2017-09-12 RX ORDER — LEVOTHYROXINE SODIUM 125 MCG
1 TABLET ORAL
Qty: 0 | Refills: 0 | COMMUNITY
Start: 2017-09-12

## 2017-09-12 RX ORDER — LORATADINE 10 MG/1
1 TABLET ORAL
Qty: 0 | Refills: 0 | COMMUNITY
Start: 2017-09-12

## 2017-09-12 RX ORDER — TRAMADOL HYDROCHLORIDE 50 MG/1
1 TABLET ORAL
Qty: 0 | Refills: 0 | COMMUNITY
Start: 2017-09-12

## 2017-09-12 RX ORDER — FUROSEMIDE 40 MG
1 TABLET ORAL
Qty: 0 | Refills: 0 | COMMUNITY
Start: 2017-09-12

## 2017-09-12 RX ORDER — TRAMADOL HYDROCHLORIDE 50 MG/1
1.5 TABLET ORAL
Qty: 0 | Refills: 0 | COMMUNITY
Start: 2017-09-12

## 2017-09-12 RX ORDER — GABAPENTIN 400 MG/1
1 CAPSULE ORAL
Qty: 0 | Refills: 0 | COMMUNITY
Start: 2017-09-12

## 2017-09-12 RX ORDER — LIDOCAINE 4 G/100G
1 CREAM TOPICAL
Qty: 0 | Refills: 0 | COMMUNITY
Start: 2017-09-12

## 2017-09-12 RX ORDER — PANTOPRAZOLE SODIUM 20 MG/1
1 TABLET, DELAYED RELEASE ORAL
Qty: 0 | Refills: 0 | COMMUNITY
Start: 2017-09-12

## 2017-09-12 RX ORDER — TRAMADOL HYDROCHLORIDE 50 MG/1
0 TABLET ORAL
Qty: 0 | Refills: 0 | COMMUNITY

## 2017-09-12 RX ORDER — AMLODIPINE BESYLATE 2.5 MG/1
0 TABLET ORAL
Qty: 0 | Refills: 0 | COMMUNITY

## 2017-09-12 RX ORDER — SENNA PLUS 8.6 MG/1
2 TABLET ORAL
Qty: 0 | Refills: 0 | COMMUNITY
Start: 2017-09-12

## 2017-09-12 RX ORDER — AMLODIPINE BESYLATE 2.5 MG/1
1 TABLET ORAL
Qty: 0 | Refills: 0 | COMMUNITY
Start: 2017-09-12

## 2017-09-12 RX ORDER — INSULIN GLARGINE 100 [IU]/ML
10 INJECTION, SOLUTION SUBCUTANEOUS
Qty: 0 | Refills: 0 | COMMUNITY
Start: 2017-09-12

## 2017-09-12 RX ORDER — INSULIN LISPRO 100/ML
4 VIAL (ML) SUBCUTANEOUS
Qty: 0 | Refills: 0 | COMMUNITY
Start: 2017-09-12

## 2017-09-12 RX ORDER — DOCUSATE SODIUM 100 MG
1 CAPSULE ORAL
Qty: 0 | Refills: 0 | COMMUNITY
Start: 2017-09-12

## 2017-09-12 RX ORDER — IPRATROPIUM/ALBUTEROL SULFATE 18-103MCG
3 AEROSOL WITH ADAPTER (GRAM) INHALATION
Qty: 0 | Refills: 0 | COMMUNITY
Start: 2017-09-12

## 2017-09-12 RX ORDER — CLOPIDOGREL BISULFATE 75 MG/1
1 TABLET, FILM COATED ORAL
Qty: 0 | Refills: 0 | COMMUNITY
Start: 2017-09-12

## 2017-09-12 RX ADMIN — Medication 3 MILLILITER(S): at 04:00

## 2017-09-12 RX ADMIN — Medication 250 MILLIGRAM(S): at 17:27

## 2017-09-12 RX ADMIN — Medication 40 MILLIGRAM(S): at 05:52

## 2017-09-12 RX ADMIN — Medication 1 TABLET(S): at 09:18

## 2017-09-12 RX ADMIN — Medication 1 SPRAY(S): at 06:26

## 2017-09-12 RX ADMIN — Medication 2: at 12:35

## 2017-09-12 RX ADMIN — PANTOPRAZOLE SODIUM 40 MILLIGRAM(S): 20 TABLET, DELAYED RELEASE ORAL at 05:53

## 2017-09-12 RX ADMIN — Medication 25 MILLIGRAM(S): at 05:53

## 2017-09-12 RX ADMIN — TRAMADOL HYDROCHLORIDE 75 MILLIGRAM(S): 50 TABLET ORAL at 17:27

## 2017-09-12 RX ADMIN — Medication 3 MILLILITER(S): at 16:24

## 2017-09-12 RX ADMIN — Medication 100 MICROGRAM(S): at 05:53

## 2017-09-12 RX ADMIN — GABAPENTIN 300 MILLIGRAM(S): 400 CAPSULE ORAL at 15:37

## 2017-09-12 RX ADMIN — AMLODIPINE BESYLATE 5 MILLIGRAM(S): 2.5 TABLET ORAL at 05:53

## 2017-09-12 RX ADMIN — Medication 5 MILLIGRAM(S): at 17:27

## 2017-09-12 RX ADMIN — Medication 10 MILLIGRAM(S): at 06:26

## 2017-09-12 RX ADMIN — Medication 3: at 17:47

## 2017-09-12 RX ADMIN — Medication 600 MILLIGRAM(S): at 17:27

## 2017-09-12 RX ADMIN — Medication 1 SPRAY(S): at 17:28

## 2017-09-12 RX ADMIN — Medication 5 MILLIGRAM(S): at 05:53

## 2017-09-12 RX ADMIN — Medication 100 MILLIGRAM(S): at 15:37

## 2017-09-12 RX ADMIN — Medication 250 MILLIGRAM(S): at 18:00

## 2017-09-12 RX ADMIN — TRAMADOL HYDROCHLORIDE 75 MILLIGRAM(S): 50 TABLET ORAL at 12:34

## 2017-09-12 RX ADMIN — Medication 1: at 09:17

## 2017-09-12 RX ADMIN — TRAMADOL HYDROCHLORIDE 75 MILLIGRAM(S): 50 TABLET ORAL at 06:32

## 2017-09-12 RX ADMIN — CLOPIDOGREL BISULFATE 75 MILLIGRAM(S): 75 TABLET, FILM COATED ORAL at 12:34

## 2017-09-12 RX ADMIN — TRAMADOL HYDROCHLORIDE 75 MILLIGRAM(S): 50 TABLET ORAL at 18:00

## 2017-09-12 RX ADMIN — Medication 3 MILLILITER(S): at 10:16

## 2017-09-12 RX ADMIN — TRAMADOL HYDROCHLORIDE 75 MILLIGRAM(S): 50 TABLET ORAL at 13:04

## 2017-09-12 RX ADMIN — Medication 100 MILLIGRAM(S): at 05:53

## 2017-09-12 RX ADMIN — TRAMADOL HYDROCHLORIDE 75 MILLIGRAM(S): 50 TABLET ORAL at 05:49

## 2017-09-12 RX ADMIN — TRAMADOL HYDROCHLORIDE 75 MILLIGRAM(S): 50 TABLET ORAL at 06:36

## 2017-09-12 RX ADMIN — Medication 1 TABLET(S): at 17:27

## 2017-09-12 RX ADMIN — GABAPENTIN 300 MILLIGRAM(S): 400 CAPSULE ORAL at 05:53

## 2017-09-12 RX ADMIN — Medication 4 UNIT(S): at 17:47

## 2017-09-12 RX ADMIN — LORATADINE 10 MILLIGRAM(S): 10 TABLET ORAL at 12:34

## 2017-09-12 RX ADMIN — Medication 4 UNIT(S): at 09:18

## 2017-09-12 RX ADMIN — Medication 60 MILLIGRAM(S): at 05:53

## 2017-09-12 RX ADMIN — Medication 600 MILLIGRAM(S): at 05:53

## 2017-09-12 RX ADMIN — Medication 4 UNIT(S): at 12:36

## 2017-09-12 NOTE — PROGRESS NOTE ADULT - PROBLEM SELECTOR PLAN 1
- cont PO lasix 40mg  - monitor I&O's  - BIPAP PRN  - TTE - showed moderate pulmonary hypertension  -  CT chest noncon showed KARIME ground glass opacity   - s/p completion of levaquin  - VQ scan neg for chronic PE  - Pt should have FU OP CT Scan in 6 weeks   - Lower leg edema, left >right, bilat doppler negative

## 2017-09-12 NOTE — PROGRESS NOTE ADULT - PROBLEM SELECTOR PLAN 3
- resolving  - monitor creatinine
- BIPAP PRN  - supplemental oxygen as needed  - duonebs  - PO steroids
- resolving  - monitor creatinine
- BIPAP PRN  - supplemental oxygen as needed  - duonebs  - po steroids
- BIPAP PRN  - supplemental oxygen as needed  - duonebs  - po steroids
- resolving  - monitor creatinine  - Diuretics d/c
- resolving  - monitor creatinine  -

## 2017-09-12 NOTE — PROGRESS NOTE ADULT - CARDIOVASCULAR
detailed exam
Regular rate & rhythm, normal S1, S2; no murmurs, gallops or rubs; no S3, S4

## 2017-09-12 NOTE — PROGRESS NOTE ADULT - PROBLEM SELECTOR PLAN 2
- a1c 8.9  - will continue lantus for now  - ISS  - monitor FSs
- Started on saline nasal spray as needed
- a1c 8.9  - will continue lantus now  - ISS  - monitor FSs
- Started on saline nasal spray as needed
-Started on saline nasal spray as needed
- a1c 8.9  - will continue lantus for now  - ISS  - monitor FSs
- a1c 8.9  - will continue lantus for now  - ISS  - monitor FSs

## 2017-09-12 NOTE — PROGRESS NOTE ADULT - SUBJECTIVE AND OBJECTIVE BOX
CHIEF COMPLAINT: Patient is a 61y old  Female who presents with a chief complaint of Shortness of breath (06 Sep 2017 15:30)    Interval Events: none overnight      REVIEW OF SYSTEMS:  Constitutional: feels good  CV: denies  Resp: denies  [x] All over systems negative  [ ] Unable to assess ROS because ________      OBJECTIVE:  ICU Vital Signs Last 24 Hrs  T(C): 36.6 (12 Sep 2017 05:43), Max: 36.6 (12 Sep 2017 05:43)  T(F): 97.8 (12 Sep 2017 05:43), Max: 97.8 (12 Sep 2017 05:43)  HR: 87 (12 Sep 2017 11:36) (80 - 910)  BP: 123/80 (12 Sep 2017 05:43) (117/73 - 138/84)  BP(mean): --  ABP: --  ABP(mean): --  RR: 18 (12 Sep 2017 05:43) (16 - 18)  SpO2: 100% (12 Sep 2017 11:36) (84% - 100%)        09-11 @ 07:01  -  09-12 @ 07:00  --------------------------------------------------------  IN: 0 mL / OUT: 300 mL / NET: -300 mL      CAPILLARY BLOOD GLUCOSE  212 (12 Sep 2017 12:04)        HOSPITAL MEDICATIONS:  MEDICATIONS  (STANDING):  dextrose 5%. 1000 milliLiter(s) (50 mL/Hr) IV Continuous <Continuous>  dextrose 50% Injectable 12.5 Gram(s) IV Push once  dextrose 50% Injectable 25 Gram(s) IV Push once  dextrose 50% Injectable 25 Gram(s) IV Push once  heparin  Injectable 5000 Unit(s) SubCutaneous every 8 hours  gabapentin 300 milliGRAM(s) Oral three times a day  loratadine 10 milliGRAM(s) Oral daily  clopidogrel Tablet 75 milliGRAM(s) Oral daily  amLODIPine   Tablet 5 milliGRAM(s) Oral daily  NIFEdipine XL 60 milliGRAM(s) Oral daily  pantoprazole    Tablet 40 milliGRAM(s) Oral before breakfast  levothyroxine 100 MICROGram(s) Oral daily  oxybutynin 5 milliGRAM(s) Oral two times a day  insulin glargine Injectable (LANTUS) 10 Unit(s) SubCutaneous at bedtime  ALBUTerol/ipratropium for Nebulization 3 milliLiter(s) Nebulizer every 6 hours  ALBUTerol    90 MICROgram(s) HFA Inhaler 1 Puff(s) Inhalation every 4 hours  tiotropium 18 MICROgram(s) Capsule 1 Capsule(s) Inhalation daily  predniSONE   Tablet 10 milliGRAM(s) Oral daily  fluticasone propionate 50 MICROgram(s)/spray Nasal Spray 1 Spray(s) Both Nostrils two times a day  lactobacillus acidophilus 1 Tablet(s) Oral two times a day with meals  furosemide    Tablet 40 milliGRAM(s) Oral daily  guaiFENesin  milliGRAM(s) Oral every 12 hours  metoprolol succinate ER 25 milliGRAM(s) Oral daily  metoprolol succinate ER      traMADol 75 milliGRAM(s) Oral every 6 hours  insulin lispro (HumaLOG) corrective regimen sliding scale   SubCutaneous Before meals and at bedtime  insulin lispro Injectable (HumaLOG) 4 Unit(s) SubCutaneous three times a day before meals  docusate sodium 100 milliGRAM(s) Oral three times a day  senna 2 Tablet(s) Oral at bedtime    MEDICATIONS  (PRN):  dextrose Gel 1 Dose(s) Oral once PRN Blood Glucose LESS THAN 70 milliGRAM(s)/deciliter  glucagon  Injectable 1 milliGRAM(s) IntraMuscular once PRN Glucose LESS THAN 70 milligrams/deciliter  acetaminophen   Tablet. 650 milliGRAM(s) Oral every 6 hours PRN mild to moderate pain  lidocaine   Patch 1 Patch Transdermal daily PRN neck pain  lidocaine   Patch 1 Patch Transdermal daily PRN back pain  sodium chloride 0.65% Nasal 1 Spray(s) Both Nostrils every 4 hours PRN Nasal Congestion  naproxen 250 milliGRAM(s) Oral two times a day PRN breakthrough pain

## 2017-09-12 NOTE — PROGRESS NOTE ADULT - PROBLEM SELECTOR PROBLEM 3
SAM (acute kidney injury)
Asthma
SAM (acute kidney injury)
Asthma
Asthma
SAM (acute kidney injury)
SAM (acute kidney injury)

## 2017-09-12 NOTE — PROGRESS NOTE ADULT - [facility] HE PE MLT D E PC
regular rate and rhythm
regular rate and rhythm
regular rate and rhythm/no rub/no murmur
no murmur/no rub/regular rate and rhythm
no murmur/regular rate and rhythm/no rub

## 2017-09-12 NOTE — PROGRESS NOTE ADULT - PROBLEM SELECTOR PROBLEM 5
HTN (hypertension)
Hypernatremia
HTN (hypertension)
Hypernatremia
Hypernatremia
HTN (hypertension)
HTN (hypertension)

## 2017-09-12 NOTE — PROGRESS NOTE ADULT - NEUROLOGICAL DETAILS
strength decreased/alert and oriented x 3
alert and oriented x 3/responds to pain/responds to verbal commands
responds to pain/responds to verbal commands/alert and oriented x 3
responds to verbal commands/responds to pain/alert and oriented x 3

## 2017-09-12 NOTE — PROGRESS NOTE ADULT - PROBLEM SELECTOR PLAN 4
- BIPAP PRN  - supplemental oxygen as needed  - elvira
- Ha1c 8.9 %   - FS still elevate, will start Pre-meals 4 Units  and changed SS to low scale, c/w Lantus   - monitor FSs
- BIPAP PRN  - supplemental oxygen as needed  - elvira
- Ha1c 8.9 %   - FS still elevate, will start Pre-meals 4 Units  and changed SS to low scale, c/w Lantus   - monitor FSs
- Ha1c 8.9 %   - FS still elevate, will start Pre-meals 4 Units  and changed SS to low scale, c/w Lantus   - monitor FSs
- BIPAP PRN  - supplemental oxygen as needed  - duonebs  - po steroids
- BIPAP PRN  - supplemental oxygen as needed  - duonebs  - po steroids

## 2017-09-12 NOTE — PROGRESS NOTE ADULT - ATTENDING COMMENTS
Negative V/Q scan.    Chronic resp failure with hypoxia, COPD, RA, untreated CHRIS, pulmonary htn.  Admitted with acute cor pulmonale and KARIME pneumonia.  Completed abx. Diuresed well, euvolumic.  Pt stable for d/c to rehab.  Oupt f/u with pulmonary -- will needs PFTs, split study, repeat CT in 4-6 weeks, eval for PD4 inhibitor

## 2017-09-12 NOTE — PROGRESS NOTE ADULT - PROBLEM SELECTOR PLAN 8
- tramadol for pain  - pt states she has outpatient rheumatologist who was trying to put her on biologics  - outpatient follow up  -c/w prednisone 10mgv QD  - IVELISSE recommended
- tramadol for pain  - pt states she has outpatient rheumatologist who was trying to put her on biologics  - outpatient follow up  -c/w prednisone 10mgv QD  - IVELISSE recommended, pt is still deciding between rehab or home with services
- tramadol for pain  - add naproxen today  - pt states she has outpatient rheumatologist who was trying to put her on biologics  - outpatient follow up  - c/w prednisone 10mg QD
Likely 2/2 SAM  Resolved.

## 2017-09-12 NOTE — PROGRESS NOTE ADULT - PROBLEM SELECTOR PLAN 5
- cont meds  - monitor BP
- now resolved
- cont meds  - monitor BP
- now resolved
Likely 2/2 SAM  Resolved.
- cont meds  - monitor BP
- cont meds  - monitor BP

## 2017-09-12 NOTE — PROGRESS NOTE ADULT - PROBLEM SELECTOR PROBLEM 6
Rheumatoid arthritis
SAM (acute kidney injury)
Rheumatoid arthritis
SAM (acute kidney injury)
SAM (acute kidney injury)
Rheumatoid arthritis
Rheumatoid arthritis

## 2017-09-12 NOTE — PROGRESS NOTE ADULT - CONSTITUTIONAL
detailed exam
Well-developed, well nourished

## 2017-09-12 NOTE — PROGRESS NOTE ADULT - PROBLEM SELECTOR PROBLEM 7
HTN (hypertension)
Need for prophylactic measure
HTN (hypertension)
Need for prophylactic measure
HTN (hypertension)
Need for prophylactic measure
Need for prophylactic measure

## 2017-09-12 NOTE — PROGRESS NOTE ADULT - NEUROLOGICAL
Alert & oriented; no sensory, motor or coordination deficits, normal reflexes
detailed exam

## 2017-09-12 NOTE — PROGRESS NOTE ADULT - PSYCHIATRIC
Affect and characteristics of appearance, verbalizations, behaviors are appropriate

## 2017-09-12 NOTE — PROGRESS NOTE ADULT - PROBLEM SELECTOR PLAN 7
- cont meds  - monitor BP
- lovenox
- cont meds  - monitor BP
- lovenox
- cont meds  - monitor BP
- lovenox
- lovenox

## 2017-09-12 NOTE — PROGRESS NOTE ADULT - RS GEN PE MLT RESP DETAILS PC
rhonchi/airway patent/breath sounds equal
breath sounds equal/no wheezes/airway patent
airway patent/rhonchi/breath sounds equal
no wheezes/airway patent/breath sounds equal
airway patent/no wheezes/rhonchi/rales
breath sounds equal/rhonchi/airway patent/wheezes

## 2017-09-12 NOTE — PROGRESS NOTE ADULT - PROBLEM SELECTOR PROBLEM 2
Hyperglycemia
Nasal congestion
Hyperglycemia
Nasal congestion
Nasal congestion
Hyperglycemia
Hyperglycemia

## 2017-09-12 NOTE — PROGRESS NOTE ADULT - PROBLEM SELECTOR PROBLEM 1
Shortness of breath

## 2017-09-12 NOTE — PROGRESS NOTE ADULT - EXTREMITIES
No cyanosis, clubbing or edema
detailed exam

## 2017-09-16 LAB
BACTERIA BLD CULT: SIGNIFICANT CHANGE UP
BACTERIA BLD CULT: SIGNIFICANT CHANGE UP

## 2017-10-03 ENCOUNTER — APPOINTMENT (OUTPATIENT)
Dept: PULMONOLOGY | Facility: CLINIC | Age: 62
End: 2017-10-03
Payer: MEDICARE

## 2017-10-03 VITALS
HEART RATE: 85 BPM | OXYGEN SATURATION: 94 % | HEIGHT: 61 IN | RESPIRATION RATE: 16 BRPM | DIASTOLIC BLOOD PRESSURE: 84 MMHG | BODY MASS INDEX: 41.16 KG/M2 | TEMPERATURE: 96.7 F | WEIGHT: 218 LBS | SYSTOLIC BLOOD PRESSURE: 136 MMHG

## 2017-10-03 DIAGNOSIS — J18.1 LOBAR PNEUMONIA, UNSPECIFIED ORGANISM: ICD-10-CM

## 2017-10-03 DIAGNOSIS — I27.2 OTHER SECONDARY PULMONARY HYPERTENSION: ICD-10-CM

## 2017-10-03 DIAGNOSIS — F17.210 NICOTINE DEPENDENCE, CIGARETTES, UNCOMPLICATED: ICD-10-CM

## 2017-10-03 DIAGNOSIS — J44.9 CHRONIC OBSTRUCTIVE PULMONARY DISEASE, UNSPECIFIED: ICD-10-CM

## 2017-10-03 PROCEDURE — 99215 OFFICE O/P EST HI 40 MIN: CPT

## 2017-10-31 ENCOUNTER — APPOINTMENT (OUTPATIENT)
Dept: CT IMAGING | Facility: IMAGING CENTER | Age: 62
End: 2017-10-31

## 2017-11-03 ENCOUNTER — APPOINTMENT (OUTPATIENT)
Dept: CT IMAGING | Facility: IMAGING CENTER | Age: 62
End: 2017-11-03

## 2017-12-07 ENCOUNTER — APPOINTMENT (OUTPATIENT)
Dept: SLEEP CENTER | Facility: CLINIC | Age: 62
End: 2017-12-07

## 2018-01-30 ENCOUNTER — RESULT REVIEW (OUTPATIENT)
Age: 63
End: 2018-01-30

## 2018-02-21 ENCOUNTER — APPOINTMENT (OUTPATIENT)
Dept: SLEEP CENTER | Facility: CLINIC | Age: 63
End: 2018-02-21

## 2018-05-22 ENCOUNTER — INPATIENT (INPATIENT)
Facility: HOSPITAL | Age: 63
LOS: 8 days | Discharge: HOME | End: 2018-05-31
Attending: STUDENT IN AN ORGANIZED HEALTH CARE EDUCATION/TRAINING PROGRAM | Admitting: STUDENT IN AN ORGANIZED HEALTH CARE EDUCATION/TRAINING PROGRAM
Payer: MEDICARE

## 2018-05-22 VITALS
DIASTOLIC BLOOD PRESSURE: 77 MMHG | SYSTOLIC BLOOD PRESSURE: 127 MMHG | TEMPERATURE: 99 F | HEART RATE: 95 BPM | OXYGEN SATURATION: 98 % | RESPIRATION RATE: 18 BRPM

## 2018-05-22 DIAGNOSIS — I50.32 CHRONIC DIASTOLIC (CONGESTIVE) HEART FAILURE: ICD-10-CM

## 2018-05-22 DIAGNOSIS — M06.9 RHEUMATOID ARTHRITIS, UNSPECIFIED: ICD-10-CM

## 2018-05-22 DIAGNOSIS — J18.9 PNEUMONIA, UNSPECIFIED ORGANISM: ICD-10-CM

## 2018-05-22 DIAGNOSIS — E03.9 HYPOTHYROIDISM, UNSPECIFIED: ICD-10-CM

## 2018-05-22 DIAGNOSIS — Z29.9 ENCOUNTER FOR PROPHYLACTIC MEASURES, UNSPECIFIED: ICD-10-CM

## 2018-05-22 DIAGNOSIS — Z86.73 PERSONAL HISTORY OF TRANSIENT ISCHEMIC ATTACK (TIA), AND CEREBRAL INFARCTION WITHOUT RESIDUAL DEFICITS: ICD-10-CM

## 2018-05-22 DIAGNOSIS — I10 ESSENTIAL (PRIMARY) HYPERTENSION: ICD-10-CM

## 2018-05-22 DIAGNOSIS — E87.5 HYPERKALEMIA: ICD-10-CM

## 2018-05-22 DIAGNOSIS — J44.0 CHRONIC OBSTRUCTIVE PULMONARY DISEASE WITH (ACUTE) LOWER RESPIRATORY INFECTION: ICD-10-CM

## 2018-05-22 LAB
ALBUMIN SERPL ELPH-MCNC: 3.9 G/DL — SIGNIFICANT CHANGE UP (ref 3.3–5)
ALP SERPL-CCNC: 79 U/L — SIGNIFICANT CHANGE UP (ref 40–120)
ALT FLD-CCNC: 21 U/L — SIGNIFICANT CHANGE UP (ref 4–33)
APTT BLD: 26.9 SEC — LOW (ref 27.5–37.4)
AST SERPL-CCNC: 40 U/L — HIGH (ref 4–32)
B PERT DNA SPEC QL NAA+PROBE: SIGNIFICANT CHANGE UP
BASE EXCESS BLDV CALC-SCNC: 2.3 MMOL/L — SIGNIFICANT CHANGE UP
BASOPHILS # BLD AUTO: 0.05 K/UL — SIGNIFICANT CHANGE UP (ref 0–0.2)
BASOPHILS NFR BLD AUTO: 0.5 % — SIGNIFICANT CHANGE UP (ref 0–2)
BILIRUB SERPL-MCNC: 0.2 MG/DL — SIGNIFICANT CHANGE UP (ref 0.2–1.2)
BLOOD GAS VENOUS - CREATININE: 1.41 MG/DL — HIGH (ref 0.5–1.3)
BUN SERPL-MCNC: 33 MG/DL — HIGH (ref 7–23)
C PNEUM DNA SPEC QL NAA+PROBE: NOT DETECTED — SIGNIFICANT CHANGE UP
CALCIUM SERPL-MCNC: 9.4 MG/DL — SIGNIFICANT CHANGE UP (ref 8.4–10.5)
CHLORIDE BLDV-SCNC: 108 MMOL/L — SIGNIFICANT CHANGE UP (ref 96–108)
CHLORIDE SERPL-SCNC: 97 MMOL/L — LOW (ref 98–107)
CO2 SERPL-SCNC: 22 MMOL/L — SIGNIFICANT CHANGE UP (ref 22–31)
CREAT SERPL-MCNC: 1.24 MG/DL — SIGNIFICANT CHANGE UP (ref 0.5–1.3)
EOSINOPHIL # BLD AUTO: 0.07 K/UL — SIGNIFICANT CHANGE UP (ref 0–0.5)
EOSINOPHIL NFR BLD AUTO: 0.7 % — SIGNIFICANT CHANGE UP (ref 0–6)
FLUAV H1 2009 PAND RNA SPEC QL NAA+PROBE: NOT DETECTED — SIGNIFICANT CHANGE UP
FLUAV H1 RNA SPEC QL NAA+PROBE: NOT DETECTED — SIGNIFICANT CHANGE UP
FLUAV H3 RNA SPEC QL NAA+PROBE: NOT DETECTED — SIGNIFICANT CHANGE UP
FLUAV SUBTYP SPEC NAA+PROBE: SIGNIFICANT CHANGE UP
FLUBV RNA SPEC QL NAA+PROBE: NOT DETECTED — SIGNIFICANT CHANGE UP
GAS PNL BLDV: 134 MMOL/L — LOW (ref 136–146)
GLUCOSE BLDV-MCNC: 346 — HIGH (ref 70–99)
GLUCOSE SERPL-MCNC: 341 MG/DL — HIGH (ref 70–99)
HADV DNA SPEC QL NAA+PROBE: NOT DETECTED — SIGNIFICANT CHANGE UP
HCO3 BLDV-SCNC: 25 MMOL/L — SIGNIFICANT CHANGE UP (ref 20–27)
HCOV 229E RNA SPEC QL NAA+PROBE: NOT DETECTED — SIGNIFICANT CHANGE UP
HCOV HKU1 RNA SPEC QL NAA+PROBE: NOT DETECTED — SIGNIFICANT CHANGE UP
HCOV NL63 RNA SPEC QL NAA+PROBE: NOT DETECTED — SIGNIFICANT CHANGE UP
HCOV OC43 RNA SPEC QL NAA+PROBE: NOT DETECTED — SIGNIFICANT CHANGE UP
HCT VFR BLD CALC: 39.2 % — SIGNIFICANT CHANGE UP (ref 34.5–45)
HCT VFR BLDV CALC: 38.7 % — SIGNIFICANT CHANGE UP (ref 34.5–45)
HGB BLD-MCNC: 12.5 G/DL — SIGNIFICANT CHANGE UP (ref 11.5–15.5)
HGB BLDV-MCNC: 12.6 G/DL — SIGNIFICANT CHANGE UP (ref 11.5–15.5)
HMPV RNA SPEC QL NAA+PROBE: NOT DETECTED — SIGNIFICANT CHANGE UP
HPIV1 RNA SPEC QL NAA+PROBE: NOT DETECTED — SIGNIFICANT CHANGE UP
HPIV2 RNA SPEC QL NAA+PROBE: NOT DETECTED — SIGNIFICANT CHANGE UP
HPIV3 RNA SPEC QL NAA+PROBE: NOT DETECTED — SIGNIFICANT CHANGE UP
HPIV4 RNA SPEC QL NAA+PROBE: NOT DETECTED — SIGNIFICANT CHANGE UP
IMM GRANULOCYTES # BLD AUTO: 0.06 # — SIGNIFICANT CHANGE UP
IMM GRANULOCYTES NFR BLD AUTO: 0.6 % — SIGNIFICANT CHANGE UP (ref 0–1.5)
INR BLD: 1 — SIGNIFICANT CHANGE UP (ref 0.88–1.17)
LACTATE BLDV-MCNC: 3.9 MMOL/L — HIGH (ref 0.5–2)
LACTATE SERPL-SCNC: 3.1 MMOL/L — HIGH (ref 0.5–2)
LYMPHOCYTES # BLD AUTO: 29.7 % — SIGNIFICANT CHANGE UP (ref 13–44)
LYMPHOCYTES # BLD AUTO: 3.11 K/UL — SIGNIFICANT CHANGE UP (ref 1–3.3)
M PNEUMO DNA SPEC QL NAA+PROBE: NOT DETECTED — SIGNIFICANT CHANGE UP
MCHC RBC-ENTMCNC: 31.3 PG — SIGNIFICANT CHANGE UP (ref 27–34)
MCHC RBC-ENTMCNC: 31.9 % — LOW (ref 32–36)
MCV RBC AUTO: 98.2 FL — SIGNIFICANT CHANGE UP (ref 80–100)
MONOCYTES # BLD AUTO: 0.71 K/UL — SIGNIFICANT CHANGE UP (ref 0–0.9)
MONOCYTES NFR BLD AUTO: 6.8 % — SIGNIFICANT CHANGE UP (ref 2–14)
NEUTROPHILS # BLD AUTO: 6.47 K/UL — SIGNIFICANT CHANGE UP (ref 1.8–7.4)
NEUTROPHILS NFR BLD AUTO: 61.7 % — SIGNIFICANT CHANGE UP (ref 43–77)
NRBC # FLD: 0 — SIGNIFICANT CHANGE UP
NT-PROBNP SERPL-SCNC: 46.82 PG/ML — SIGNIFICANT CHANGE UP
PCO2 BLDV: 54 MMHG — HIGH (ref 41–51)
PH BLDV: 7.33 PH — SIGNIFICANT CHANGE UP (ref 7.32–7.43)
PLATELET # BLD AUTO: 295 K/UL — SIGNIFICANT CHANGE UP (ref 150–400)
PMV BLD: 10.7 FL — SIGNIFICANT CHANGE UP (ref 7–13)
PO2 BLDV: 45 MMHG — HIGH (ref 35–40)
POTASSIUM BLDV-SCNC: 7.1 MMOL/L — CRITICAL HIGH (ref 3.4–4.5)
POTASSIUM SERPL-MCNC: 6.4 MMOL/L — CRITICAL HIGH (ref 3.5–5.3)
POTASSIUM SERPL-MCNC: SIGNIFICANT CHANGE UP MMOL/L (ref 3.5–5.3)
POTASSIUM SERPL-SCNC: 6.4 MMOL/L — CRITICAL HIGH (ref 3.5–5.3)
POTASSIUM SERPL-SCNC: SIGNIFICANT CHANGE UP MMOL/L (ref 3.5–5.3)
PROT SERPL-MCNC: 7.8 G/DL — SIGNIFICANT CHANGE UP (ref 6–8.3)
PROTHROM AB SERPL-ACNC: 11.1 SEC — SIGNIFICANT CHANGE UP (ref 9.8–13.1)
RBC # BLD: 3.99 M/UL — SIGNIFICANT CHANGE UP (ref 3.8–5.2)
RBC # FLD: 14.4 % — SIGNIFICANT CHANGE UP (ref 10.3–14.5)
RSV RNA SPEC QL NAA+PROBE: NOT DETECTED — SIGNIFICANT CHANGE UP
RV+EV RNA SPEC QL NAA+PROBE: NOT DETECTED — SIGNIFICANT CHANGE UP
SAO2 % BLDV: 74 % — SIGNIFICANT CHANGE UP (ref 60–85)
SODIUM SERPL-SCNC: 137 MMOL/L — SIGNIFICANT CHANGE UP (ref 135–145)
TROPONIN T SERPL-MCNC: < 0.06 NG/ML — SIGNIFICANT CHANGE UP (ref 0–0.06)
TSH SERPL-MCNC: 31.16 UIU/ML — HIGH (ref 0.27–4.2)
TSH SERPL-MCNC: 33.82 UIU/ML — HIGH (ref 0.27–4.2)
WBC # BLD: 10.47 K/UL — SIGNIFICANT CHANGE UP (ref 3.8–10.5)
WBC # FLD AUTO: 10.47 K/UL — SIGNIFICANT CHANGE UP (ref 3.8–10.5)

## 2018-05-22 PROCEDURE — 71045 X-RAY EXAM CHEST 1 VIEW: CPT | Mod: 26

## 2018-05-22 PROCEDURE — 99223 1ST HOSP IP/OBS HIGH 75: CPT

## 2018-05-22 RX ORDER — OXYBUTYNIN CHLORIDE 5 MG
5 TABLET ORAL
Qty: 0 | Refills: 0 | Status: DISCONTINUED | OUTPATIENT
Start: 2018-05-22 | End: 2018-05-31

## 2018-05-22 RX ORDER — LORATADINE 10 MG/1
10 TABLET ORAL DAILY
Qty: 0 | Refills: 0 | Status: DISCONTINUED | OUTPATIENT
Start: 2018-05-22 | End: 2018-05-31

## 2018-05-22 RX ORDER — SODIUM CHLORIDE 9 MG/ML
500 INJECTION INTRAMUSCULAR; INTRAVENOUS; SUBCUTANEOUS ONCE
Qty: 0 | Refills: 0 | Status: COMPLETED | OUTPATIENT
Start: 2018-05-22 | End: 2018-05-22

## 2018-05-22 RX ORDER — SIMVASTATIN 20 MG/1
20 TABLET, FILM COATED ORAL AT BEDTIME
Qty: 0 | Refills: 0 | Status: DISCONTINUED | OUTPATIENT
Start: 2018-05-22 | End: 2018-05-31

## 2018-05-22 RX ORDER — GABAPENTIN 400 MG/1
300 CAPSULE ORAL THREE TIMES A DAY
Qty: 0 | Refills: 0 | Status: DISCONTINUED | OUTPATIENT
Start: 2018-05-22 | End: 2018-05-26

## 2018-05-22 RX ORDER — CLOPIDOGREL BISULFATE 75 MG/1
75 TABLET, FILM COATED ORAL DAILY
Qty: 0 | Refills: 0 | Status: DISCONTINUED | OUTPATIENT
Start: 2018-05-22 | End: 2018-05-31

## 2018-05-22 RX ORDER — METOPROLOL TARTRATE 50 MG
50 TABLET ORAL DAILY
Qty: 0 | Refills: 0 | Status: DISCONTINUED | OUTPATIENT
Start: 2018-05-22 | End: 2018-05-31

## 2018-05-22 RX ORDER — IPRATROPIUM/ALBUTEROL SULFATE 18-103MCG
3 AEROSOL WITH ADAPTER (GRAM) INHALATION EVERY 6 HOURS
Qty: 0 | Refills: 0 | Status: DISCONTINUED | OUTPATIENT
Start: 2018-05-22 | End: 2018-05-25

## 2018-05-22 RX ORDER — IPRATROPIUM/ALBUTEROL SULFATE 18-103MCG
3 AEROSOL WITH ADAPTER (GRAM) INHALATION ONCE
Qty: 0 | Refills: 0 | Status: COMPLETED | OUTPATIENT
Start: 2018-05-22 | End: 2018-05-22

## 2018-05-22 RX ORDER — TIOTROPIUM BROMIDE 18 UG/1
1 CAPSULE ORAL; RESPIRATORY (INHALATION) DAILY
Qty: 0 | Refills: 0 | Status: DISCONTINUED | OUTPATIENT
Start: 2018-05-22 | End: 2018-05-31

## 2018-05-22 RX ORDER — TRAMADOL HYDROCHLORIDE 50 MG/1
50 TABLET ORAL EVERY 6 HOURS
Qty: 0 | Refills: 0 | Status: DISCONTINUED | OUTPATIENT
Start: 2018-05-22 | End: 2018-05-24

## 2018-05-22 RX ORDER — PANTOPRAZOLE SODIUM 20 MG/1
40 TABLET, DELAYED RELEASE ORAL
Qty: 0 | Refills: 0 | Status: DISCONTINUED | OUTPATIENT
Start: 2018-05-22 | End: 2018-05-31

## 2018-05-22 RX ORDER — HEPARIN SODIUM 5000 [USP'U]/ML
7500 INJECTION INTRAVENOUS; SUBCUTANEOUS EVERY 8 HOURS
Qty: 0 | Refills: 0 | Status: DISCONTINUED | OUTPATIENT
Start: 2018-05-22 | End: 2018-05-31

## 2018-05-22 RX ORDER — FUROSEMIDE 40 MG
20 TABLET ORAL AT BEDTIME
Qty: 0 | Refills: 0 | Status: DISCONTINUED | OUTPATIENT
Start: 2018-05-22 | End: 2018-05-31

## 2018-05-22 RX ORDER — AMLODIPINE BESYLATE 2.5 MG/1
5 TABLET ORAL DAILY
Qty: 0 | Refills: 0 | Status: DISCONTINUED | OUTPATIENT
Start: 2018-05-22 | End: 2018-05-31

## 2018-05-22 RX ORDER — FUROSEMIDE 40 MG
40 TABLET ORAL DAILY
Qty: 0 | Refills: 0 | Status: DISCONTINUED | OUTPATIENT
Start: 2018-05-22 | End: 2018-05-31

## 2018-05-22 RX ORDER — ALBUTEROL 90 UG/1
1 AEROSOL, METERED ORAL EVERY 4 HOURS
Qty: 0 | Refills: 0 | Status: DISCONTINUED | OUTPATIENT
Start: 2018-05-22 | End: 2018-05-31

## 2018-05-22 RX ORDER — LEVOTHYROXINE SODIUM 125 MCG
112 TABLET ORAL DAILY
Qty: 0 | Refills: 0 | Status: DISCONTINUED | OUTPATIENT
Start: 2018-05-22 | End: 2018-05-23

## 2018-05-22 RX ADMIN — Medication 3 MILLILITER(S): at 20:20

## 2018-05-22 RX ADMIN — SODIUM CHLORIDE 500 MILLILITER(S): 9 INJECTION INTRAMUSCULAR; INTRAVENOUS; SUBCUTANEOUS at 18:55

## 2018-05-22 RX ADMIN — Medication 50 MILLIGRAM(S): at 20:19

## 2018-05-22 NOTE — H&P ADULT - PROBLEM SELECTOR PLAN 4
TSH elevated to 33.   - increased synthroid dose to 112mcg.  Will need outpt f/u in 6 weeks - continue prednisone   - continue naproxen and tramadol

## 2018-05-22 NOTE — ED PROVIDER NOTE - ATTENDING CONTRIBUTION TO CARE
Dr. Lorenz:  I have personally performed a face to face bedside history and physical examination of this patient. I have discussed the history, examination, review of systems, assessment and plan of management with the resident. I have reviewed the electronic medical record and amended it to reflect my history, review of systems, physical exam, assessment and plan.    62F h/o COPD, pulm HTN, home 3L O2, HF, RA on chronic prednisone, HTN, DM, HLD, presents with SOB and fever/cough x 5 days.  Productive of yellow sputum.  Also states she has been gaining weight, takes 40mg 1-2 times a day normally of Lasix.      Exam:  - nad, morbidly obese  - rrr  - Crackles R lung base  - abd soft ntnd  - trace edema BLE    A/P  - consider COPD exacerbation, CHF exacerbation, viral vs. bacterial respiratory infection, r/o ACS  - cbc, cmp, vbg, trop  - ekg  - cxr  - duonefaby, yovani

## 2018-05-22 NOTE — H&P ADULT - PROBLEM SELECTOR PROBLEM 4
Adult Hypothyroidism Rheumatoid arthritis, involving unspecified site, unspecified rheumatoid factor presence

## 2018-05-22 NOTE — H&P ADULT - PROBLEM SELECTOR PLAN 5
- continue metoprolol and amlodipine TSH elevated to 33.   - increased synthroid dose to 112mcg.  Will need outpt f/u in 6 weeks

## 2018-05-22 NOTE — ED PROVIDER NOTE - CARE PLAN
Principal Discharge DX:	Pneumonia  Assessment and plan of treatment:	likely PNA, copd, possible viral.

## 2018-05-22 NOTE — ED PROVIDER NOTE - CHIEF COMPLAINT
The patient is a 62y Female complaining of The patient is a 62y Female complaining of fever cough, SOB

## 2018-05-22 NOTE — H&P ADULT - PROBLEM SELECTOR PROBLEM 3
Rheumatoid arthritis, involving unspecified site, unspecified rheumatoid factor presence Hyperkalemia

## 2018-05-22 NOTE — H&P ADULT - ASSESSMENT
61 y/o F with COPD, pulmonary HTN, RA on prednisone, TIA, HTN p/w dyspnea fever and mucus production.  Possibly developing pneumonia, although imaging negative.

## 2018-05-22 NOTE — ED PROVIDER NOTE - OBJECTIVE STATEMENT
63 y/o F with PMH RA on prednisone, hypothyroid, HTN, Moderate pulm HTN w/ Distolic HF, morbid obesity, DM, COPD on 3L o2 p/w fever , and productive cough w/ SOB x 5 days. Pt. states she has been having a productive cough for yellowish green sputum which has been worsening over the last 5 days. She states the cough is accompanied of breath. She reports having weight gain during this time as well. She states he takes lasix 40mg BID and prednisone daily. She was last at [facility] w/ likely PNA in october. She follows with dr. lisker. Tmax was 101 today, she took aspirin prior to arrival to ed.

## 2018-05-22 NOTE — ED ADULT NURSE NOTE - OBJECTIVE STATEMENT
Pt a&ox3 c/o fevers, productive cough, sob, congestion and , chest tightness x 5 days.  Pt is on home O2 3L NC, breathing even and unlabored at rest, denies cp/discomfort, denies headache/dizziness, abd soft, non-tender, non-distended, skin is cool dry and intact, ivl placed, labs sent. Will continue to monitor.

## 2018-05-22 NOTE — ED PROVIDER NOTE - MEDICAL DECISION MAKING DETAILS
63 y/o F with PMH RA on prednisone, hypothyroid, HTN, Moderate pulm HTN w/ Distolic HF, morbid obesity, DM, COPD on 3L o2 p/w fever , and productive cough w/ SOB x 5 days. r/o PNA vs concurrent infection and HF , cxr, cbc, cmp , vbg, procalcitonin, rvp, blood culture, probnp, levaquin, admission.

## 2018-05-22 NOTE — H&P ADULT - NSHPREVIEWOFSYSTEMS_GEN_ALL_CORE
REVIEW OF SYSTEMS    General:  Negative  Skin/Breast:  Negative  Ophthalmologic:  Negative  ENMT:  postnasal drip  Respiratory and Thorax:  dyspnea, mucus production without cough.  chest congestion  Cardiovascular:  occasional orthopnea.  Chronic leg edema unchanged from baseline  Gastrointestinal:  Loose stools  Genitourinary:  Negative  Musculoskeletal:  chronic joint and body pain  Neurological:  Negative  Psychiatric:  Negative  Hematology/Lymphatics:  Negative	  Endocrine:	  Negative  Allergic/Immunologic:	 Negative

## 2018-05-22 NOTE — H&P ADULT - PROBLEM SELECTOR PLAN 3
- continue prednisone   - continue naproxen and tramadol hemolyzed specimen.    - will send in green top tube

## 2018-05-22 NOTE — ED ADULT NURSE REASSESSMENT NOTE - NS ED NURSE REASSESS COMMENT FT1
pt resting comfortably. respirations even unlabored. vitals noted. denies discomforts. denies pain. NSR on cm. will monitor.

## 2018-05-22 NOTE — H&P ADULT - NSHPPHYSICALEXAM_GEN_ALL_CORE
Vital Signs Last 24 Hrs  T(C): 36.8 (22 May 2018 22:13), Max: 37.6 (22 May 2018 19:31)  T(F): 98.3 (22 May 2018 22:13), Max: 99.6 (22 May 2018 19:31)  HR: 90 (22 May 2018 22:13) (84 - 95)  BP: 157/96 (22 May 2018 22:13) (120/75 - 162/60)  BP(mean): --  RR: 18 (22 May 2018 22:13) (16 - 18)  SpO2: 94% (22 May 2018 22:13) (94% - 100%)    PHYSICAL EXAM:  GENERAL: No Acute Distress  HEAD:  Atraumatic, Normocephalic  EYES: conjunctiva and sclera clear  ENMT: Moist mucous membranes   NECK: Supple  CHEST/LUNG: Clear to auscultation bilaterally  HEART: Regular rate and rhythm; No murmurs, rubs, or gallops  ABDOMEN: Soft, Nontender, Nondistended; Bowel sounds normal  EXTREMITIES:   1+ LE edema  PSYCH: Normal Affect, Normal Behavior  NEUROLOGY:   - Mental status A&O x 3,  - Motor: bilateral hand tremors  SKIN: No rashes or lesions  MUSCULOSKELETAL: No joint swelling

## 2018-05-22 NOTE — ED ADULT TRIAGE NOTE - CHIEF COMPLAINT QUOTE
Pt c/o fever,  sob, congestion and , chest tightness x 5 days.  Pt 02 dependent at home 3 L.  Pt took asp at 2pm

## 2018-05-22 NOTE — H&P ADULT - NSHPLABSRESULTS_GEN_ALL_CORE
05-22    x   |  x   |  x   ----------------------------<  x   6.4<HH>   |  x   |  x     Ca    9.4      22 May 2018 18:00    TPro  7.8  /  Alb  3.9  /  TBili  0.2  /  DBili  x   /  AST  40<H>  /  ALT  21  /  AlkPhos  79  05-22                            12.5   10.47 )-----------( 295      ( 22 May 2018 18:00 )             39.2       CARDIAC MARKERS ( 22 May 2018 18:00 )  x     / < 0.06 ng/mL / x     / x     / x            PT/INR - ( 22 May 2018 18:00 )   PT: 11.1 SEC;   INR: 1.00          PTT - ( 22 May 2018 18:00 )  PTT:26.9 SEC    cxr film reviewed: Clear lungs

## 2018-05-22 NOTE — H&P ADULT - HISTORY OF PRESENT ILLNESS
61 y/o F with COPD, pulmonary HTN, RA on prednisone, TIA, HTN p/w dyspnea and fever for the past 3 days.  Pt reports feeling very dyspneic over baseline.  Occasionally has orthopnea, but not consistently.  She reports fevers up to 101F intermittently with diaphoresis, and chills.  She has minimal cough, but continuously brings up white to yellow mucus.  She initially felt postnasal drip which has improved, but continues to have chest congestion.      In the ED, pt was given Duoneb and levofloxacin.

## 2018-05-23 ENCOUNTER — APPOINTMENT (OUTPATIENT)
Dept: PULMONOLOGY | Facility: CLINIC | Age: 63
End: 2018-05-23

## 2018-05-23 DIAGNOSIS — Z29.9 ENCOUNTER FOR PROPHYLACTIC MEASURES, UNSPECIFIED: ICD-10-CM

## 2018-05-23 DIAGNOSIS — I27.20 PULMONARY HYPERTENSION, UNSPECIFIED: ICD-10-CM

## 2018-05-23 LAB
BUN SERPL-MCNC: 27 MG/DL — HIGH (ref 7–23)
CALCIUM SERPL-MCNC: 9.2 MG/DL — SIGNIFICANT CHANGE UP (ref 8.4–10.5)
CHLORIDE SERPL-SCNC: 98 MMOL/L — SIGNIFICANT CHANGE UP (ref 98–107)
CO2 SERPL-SCNC: 24 MMOL/L — SIGNIFICANT CHANGE UP (ref 22–31)
CREAT SERPL-MCNC: 1.08 MG/DL — SIGNIFICANT CHANGE UP (ref 0.5–1.3)
GLUCOSE BLDC GLUCOMTR-MCNC: 301 MG/DL — HIGH (ref 70–99)
GLUCOSE BLDC GLUCOMTR-MCNC: 353 MG/DL — HIGH (ref 70–99)
GLUCOSE BLDC GLUCOMTR-MCNC: 365 MG/DL — HIGH (ref 70–99)
GLUCOSE BLDC GLUCOMTR-MCNC: 366 MG/DL — HIGH (ref 70–99)
GLUCOSE SERPL-MCNC: 313 MG/DL — HIGH (ref 70–99)
GRAM STN SPT: SIGNIFICANT CHANGE UP
POTASSIUM SERPL-MCNC: 4.6 MMOL/L — SIGNIFICANT CHANGE UP (ref 3.5–5.3)
POTASSIUM SERPL-SCNC: 4.6 MMOL/L — SIGNIFICANT CHANGE UP (ref 3.5–5.3)
PROCALCITONIN SERPL-MCNC: <0.05 NG/ML — HIGH (ref 0–0.04)
SODIUM SERPL-SCNC: 139 MMOL/L — SIGNIFICANT CHANGE UP (ref 135–145)
SPECIMEN SOURCE: SIGNIFICANT CHANGE UP

## 2018-05-23 RX ORDER — INSULIN LISPRO 100/ML
VIAL (ML) SUBCUTANEOUS
Qty: 0 | Refills: 0 | Status: DISCONTINUED | OUTPATIENT
Start: 2018-05-23 | End: 2018-05-31

## 2018-05-23 RX ORDER — DEXTROSE 50 % IN WATER 50 %
15 SYRINGE (ML) INTRAVENOUS ONCE
Qty: 0 | Refills: 0 | Status: DISCONTINUED | OUTPATIENT
Start: 2018-05-23 | End: 2018-05-31

## 2018-05-23 RX ORDER — INSULIN LISPRO 100/ML
VIAL (ML) SUBCUTANEOUS
Qty: 0 | Refills: 0 | Status: DISCONTINUED | OUTPATIENT
Start: 2018-05-23 | End: 2018-05-23

## 2018-05-23 RX ORDER — BUDESONIDE AND FORMOTEROL FUMARATE DIHYDRATE 160; 4.5 UG/1; UG/1
2 AEROSOL RESPIRATORY (INHALATION)
Qty: 0 | Refills: 0 | Status: DISCONTINUED | OUTPATIENT
Start: 2018-05-23 | End: 2018-05-31

## 2018-05-23 RX ORDER — DEXTROSE 50 % IN WATER 50 %
25 SYRINGE (ML) INTRAVENOUS ONCE
Qty: 0 | Refills: 0 | Status: DISCONTINUED | OUTPATIENT
Start: 2018-05-23 | End: 2018-05-31

## 2018-05-23 RX ORDER — INSULIN LISPRO 100/ML
VIAL (ML) SUBCUTANEOUS AT BEDTIME
Qty: 0 | Refills: 0 | Status: DISCONTINUED | OUTPATIENT
Start: 2018-05-23 | End: 2018-05-23

## 2018-05-23 RX ORDER — LEVOTHYROXINE SODIUM 125 MCG
125 TABLET ORAL DAILY
Qty: 0 | Refills: 0 | Status: DISCONTINUED | OUTPATIENT
Start: 2018-05-24 | End: 2018-05-31

## 2018-05-23 RX ORDER — INSULIN LISPRO 100/ML
VIAL (ML) SUBCUTANEOUS AT BEDTIME
Qty: 0 | Refills: 0 | Status: DISCONTINUED | OUTPATIENT
Start: 2018-05-23 | End: 2018-05-31

## 2018-05-23 RX ORDER — DEXTROSE 50 % IN WATER 50 %
12.5 SYRINGE (ML) INTRAVENOUS ONCE
Qty: 0 | Refills: 0 | Status: DISCONTINUED | OUTPATIENT
Start: 2018-05-23 | End: 2018-05-31

## 2018-05-23 RX ORDER — SODIUM CHLORIDE 9 MG/ML
1000 INJECTION, SOLUTION INTRAVENOUS
Qty: 0 | Refills: 0 | Status: DISCONTINUED | OUTPATIENT
Start: 2018-05-23 | End: 2018-05-31

## 2018-05-23 RX ORDER — GLUCAGON INJECTION, SOLUTION 0.5 MG/.1ML
1 INJECTION, SOLUTION SUBCUTANEOUS ONCE
Qty: 0 | Refills: 0 | Status: DISCONTINUED | OUTPATIENT
Start: 2018-05-23 | End: 2018-05-31

## 2018-05-23 RX ADMIN — TRAMADOL HYDROCHLORIDE 50 MILLIGRAM(S): 50 TABLET ORAL at 22:08

## 2018-05-23 RX ADMIN — Medication 250 MILLIGRAM(S): at 13:29

## 2018-05-23 RX ADMIN — Medication 40 MILLIGRAM(S): at 05:51

## 2018-05-23 RX ADMIN — Medication 3 MILLILITER(S): at 09:56

## 2018-05-23 RX ADMIN — LORATADINE 10 MILLIGRAM(S): 10 TABLET ORAL at 13:26

## 2018-05-23 RX ADMIN — TRAMADOL HYDROCHLORIDE 50 MILLIGRAM(S): 50 TABLET ORAL at 05:51

## 2018-05-23 RX ADMIN — Medication 10: at 13:31

## 2018-05-23 RX ADMIN — SIMVASTATIN 20 MILLIGRAM(S): 20 TABLET, FILM COATED ORAL at 21:33

## 2018-05-23 RX ADMIN — GABAPENTIN 300 MILLIGRAM(S): 400 CAPSULE ORAL at 21:33

## 2018-05-23 RX ADMIN — Medication 112 MICROGRAM(S): at 05:51

## 2018-05-23 RX ADMIN — Medication 250 MILLIGRAM(S): at 22:03

## 2018-05-23 RX ADMIN — Medication 20 MILLIGRAM(S): at 00:40

## 2018-05-23 RX ADMIN — Medication 3 MILLILITER(S): at 03:48

## 2018-05-23 RX ADMIN — Medication 5: at 09:40

## 2018-05-23 RX ADMIN — PANTOPRAZOLE SODIUM 40 MILLIGRAM(S): 20 TABLET, DELAYED RELEASE ORAL at 05:52

## 2018-05-23 RX ADMIN — TRAMADOL HYDROCHLORIDE 50 MILLIGRAM(S): 50 TABLET ORAL at 21:38

## 2018-05-23 RX ADMIN — Medication 20 MILLIGRAM(S): at 21:33

## 2018-05-23 RX ADMIN — Medication 6: at 21:34

## 2018-05-23 RX ADMIN — TRAMADOL HYDROCHLORIDE 50 MILLIGRAM(S): 50 TABLET ORAL at 06:20

## 2018-05-23 RX ADMIN — Medication 10 MILLIGRAM(S): at 05:51

## 2018-05-23 RX ADMIN — GABAPENTIN 300 MILLIGRAM(S): 400 CAPSULE ORAL at 00:39

## 2018-05-23 RX ADMIN — GABAPENTIN 300 MILLIGRAM(S): 400 CAPSULE ORAL at 05:51

## 2018-05-23 RX ADMIN — Medication 250 MILLIGRAM(S): at 14:25

## 2018-05-23 RX ADMIN — Medication 3 MILLILITER(S): at 22:11

## 2018-05-23 RX ADMIN — GABAPENTIN 300 MILLIGRAM(S): 400 CAPSULE ORAL at 13:26

## 2018-05-23 RX ADMIN — Medication 10 MILLIGRAM(S): at 13:26

## 2018-05-23 RX ADMIN — Medication 5 MILLIGRAM(S): at 05:52

## 2018-05-23 RX ADMIN — AMLODIPINE BESYLATE 5 MILLIGRAM(S): 2.5 TABLET ORAL at 05:51

## 2018-05-23 RX ADMIN — Medication 250 MILLIGRAM(S): at 21:33

## 2018-05-23 RX ADMIN — Medication 8: at 18:42

## 2018-05-23 RX ADMIN — Medication 5 MILLIGRAM(S): at 17:50

## 2018-05-23 RX ADMIN — CLOPIDOGREL BISULFATE 75 MILLIGRAM(S): 75 TABLET, FILM COATED ORAL at 13:26

## 2018-05-23 RX ADMIN — Medication 3 MILLILITER(S): at 15:58

## 2018-05-23 RX ADMIN — Medication 50 MILLIGRAM(S): at 05:51

## 2018-05-23 NOTE — PROGRESS NOTE ADULT - ASSESSMENT
63 y/o F with COPD, pulmonary HTN, RA on prednisone, TIA, HTN p/w dyspnea fever and mucus production.

## 2018-05-23 NOTE — PROGRESS NOTE ADULT - SUBJECTIVE AND OBJECTIVE BOX
CHIEF COMPLAINT:    Interval Events:      REVIEW OF SYSTEMS:  Constitutional:   Eyes:  ENT:  CV:  Resp:  GI:  :  MSK:  Integumentary:  Neurological:  Psychiatric:  Endocrine:  Hematologic/Lymphatic:  Allergic/Immunologic:  [ ] All other systems negative  [ ] Unable to assess ROS because ________      OBJECTIVE:  ICU Vital Signs Last 24 Hrs  T(C): 35.8 (23 May 2018 05:49), Max: 37.6 (22 May 2018 19:31)  T(F): 96.5 (23 May 2018 05:49), Max: 99.6 (22 May 2018 19:31)  HR: 90 (23 May 2018 05:49) (84 - 95)  BP: 145/87 (23 May 2018 05:49) (120/75 - 162/60)  BP(mean): --  ABP: --  ABP(mean): --  RR: 19 (23 May 2018 05:49) (16 - 19)  SpO2: 98% (23 May 2018 05:49) (94% - 100%)    05-22 @ 07:01  -  05-23 @ 07:00  --------------------------------------------------------  IN: 0 mL / OUT: 700 mL / NET: -700 mL    HOSPITAL MEDICATIONS:  MEDICATIONS  (STANDING):  ALBUTerol    90 MICROgram(s) HFA Inhaler 1 Puff(s) Inhalation every 4 hours  ALBUTerol/ipratropium for Nebulization 3 milliLiter(s) Nebulizer every 6 hours  amLODIPine   Tablet 5 milliGRAM(s) Oral daily  clopidogrel Tablet 75 milliGRAM(s) Oral daily  furosemide    Tablet 20 milliGRAM(s) Oral at bedtime  furosemide    Tablet 40 milliGRAM(s) Oral daily  gabapentin 300 milliGRAM(s) Oral three times a day  heparin  Injectable 7500 Unit(s) SubCutaneous every 8 hours  levoFLOXacin IVPB 750 milliGRAM(s) IV Intermittent every 24 hours  levothyroxine 112 MICROGram(s) Oral daily  loratadine 10 milliGRAM(s) Oral daily  metoprolol succinate ER 50 milliGRAM(s) Oral daily  oxybutynin 5 milliGRAM(s) Oral two times a day  pantoprazole    Tablet 40 milliGRAM(s) Oral before breakfast  predniSONE   Tablet 10 milliGRAM(s) Oral daily  simvastatin 20 milliGRAM(s) Oral at bedtime  tiotropium 18 MICROgram(s) Capsule 1 Capsule(s) Inhalation daily    MEDICATIONS  (PRN):  naproxen 250 milliGRAM(s) Oral two times a day PRN moderate pain  traMADol 50 milliGRAM(s) Oral every 6 hours PRN Severe Pain (7 - 10)      LABS:                        12.5   10.47 )-----------( 295      ( 22 May 2018 18:00 )             39.2     05-23    139  |  98  |  27<H>  ----------------------------<  313<H>  4.6   |  24  |  1.08    Ca    9.2      23 May 2018 00:30    TPro  7.8  /  Alb  3.9  /  TBili  0.2  /  DBili  x   /  AST  40<H>  /  ALT  21  /  AlkPhos  79  05-22    PT/INR - ( 22 May 2018 18:00 )   PT: 11.1 SEC;   INR: 1.00          PTT - ( 22 May 2018 18:00 )  PTT:26.9 SEC      Venous Blood Gas:  05-22 @ 18:00  7.33/54/45/25/74.0  VBG Lactate: 3.9      MICROBIOLOGY:     RADIOLOGY:  [ ] Reviewed and interpreted by me    PULMONARY FUNCTION TESTS:    EKG: CHIEF COMPLAINT: Patient is a 62y old  Female who presents with a chief complaint of dyspnea, fever (22 May 2018 23:01)    Interval Events: admitted overnight      REVIEW OF SYSTEMS:  CV: pain when coughs  Resp: congestion, c/o cough, COREAS  GI: denies  [x] All other systems negative  [ ] Unable to assess ROS because ________      OBJECTIVE:  ICU Vital Signs Last 24 Hrs  T(C): 35.8 (23 May 2018 05:49), Max: 37.6 (22 May 2018 19:31)  T(F): 96.5 (23 May 2018 05:49), Max: 99.6 (22 May 2018 19:31)  HR: 90 (23 May 2018 05:49) (84 - 95)  BP: 145/87 (23 May 2018 05:49) (120/75 - 162/60)  BP(mean): --  ABP: --  ABP(mean): --  RR: 19 (23 May 2018 05:49) (16 - 19)  SpO2: 98% (23 May 2018 05:49) (94% - 100%)    05-22 @ 07:01  -  05-23 @ 07:00  --------------------------------------------------------  IN: 0 mL / OUT: 700 mL / NET: -700 mL    HOSPITAL MEDICATIONS:  MEDICATIONS  (STANDING):  ALBUTerol    90 MICROgram(s) HFA Inhaler 1 Puff(s) Inhalation every 4 hours  ALBUTerol/ipratropium for Nebulization 3 milliLiter(s) Nebulizer every 6 hours  amLODIPine   Tablet 5 milliGRAM(s) Oral daily  clopidogrel Tablet 75 milliGRAM(s) Oral daily  furosemide    Tablet 20 milliGRAM(s) Oral at bedtime  furosemide    Tablet 40 milliGRAM(s) Oral daily  gabapentin 300 milliGRAM(s) Oral three times a day  heparin  Injectable 7500 Unit(s) SubCutaneous every 8 hours  levoFLOXacin IVPB 750 milliGRAM(s) IV Intermittent every 24 hours  levothyroxine 112 MICROGram(s) Oral daily  loratadine 10 milliGRAM(s) Oral daily  metoprolol succinate ER 50 milliGRAM(s) Oral daily  oxybutynin 5 milliGRAM(s) Oral two times a day  pantoprazole    Tablet 40 milliGRAM(s) Oral before breakfast  predniSONE   Tablet 10 milliGRAM(s) Oral daily  simvastatin 20 milliGRAM(s) Oral at bedtime  tiotropium 18 MICROgram(s) Capsule 1 Capsule(s) Inhalation daily    MEDICATIONS  (PRN):  naproxen 250 milliGRAM(s) Oral two times a day PRN moderate pain  traMADol 50 milliGRAM(s) Oral every 6 hours PRN Severe Pain (7 - 10)      LABS:                        12.5   10.47 )-----------( 295      ( 22 May 2018 18:00 )             39.2     05-23    139  |  98  |  27<H>  ----------------------------<  313<H>  4.6   |  24  |  1.08    Ca    9.2      23 May 2018 00:30    TPro  7.8  /  Alb  3.9  /  TBili  0.2  /  DBili  x   /  AST  40<H>  /  ALT  21  /  AlkPhos  79  05-22    PT/INR - ( 22 May 2018 18:00 )   PT: 11.1 SEC;   INR: 1.00          PTT - ( 22 May 2018 18:00 )  PTT:26.9 SEC      Venous Blood Gas:  05-22 @ 18:00  7.33/54/45/25/74.0  VBG Lactate: 3.9      MICROBIOLOGY:     RADIOLOGY:  [ ] Reviewed and interpreted by me    PULMONARY FUNCTION TESTS:    EKG:

## 2018-05-23 NOTE — PROGRESS NOTE ADULT - PROBLEM SELECTOR PLAN 3
- on chronic steroids, 10mg at home  - cont pain medications as needed  - pt has outpt rheumatologist who gives orencia (has been off for about 2 months 2/2 being on antibiotics)

## 2018-05-23 NOTE — PROGRESS NOTE ADULT - ATTENDING COMMENTS
62F PMH RA (on Orencia and chronic prednisone), COPD, pulmonary HTN, sleep apnea (not treated), HTN, hyperlipidemia, DM, TIA/CVA, hypothyroidism admitted in Sept 2017 for CAP and COPD exacerbation with right sided heart failure requiring diuresis. Presents for fever, productive cough with yellow sputum, SOB and chest congestion. Admitted for acute COPD exacerbation.    - CXR without definitive infiltrates  - has been afebrile since admission  - check sputum cx  - RVP negative  - cont oxygen supplementation  - prednisone, duonebs, levaquin for COPD exacerbation  - blood sugars elevated with underlying DM with steroid use: increase insulin sliding scale coverage to moderate dose regimen  - hypothyroid on labs, will increase synthroid to 125mcg daily 62F PMH RA (on Orencia and chronic prednisone), COPD, pulmonary HTN, sleep apnea (not treated), HTN, hyperlipidemia, DM, TIA/CVA, hypothyroidism admitted in Sept 2017 for CAP and COPD exacerbation with right sided heart failure requiring diuresis. Presents for fever, productive cough with yellow sputum, SOB and chest congestion. Admitted for acute COPD exacerbation.    - CXR without definitive infiltrates  - has been afebrile since admission  - check sputum cx  - RVP negative  - cont oxygen supplementation  - prednisone, duonebs, levaquin for COPD exacerbation  - blood sugars elevated with underlying DM with steroid use: increase insulin sliding scale coverage to moderate dose regimen  - hypothyroid on labs, will increase synthroid to 125mcg daily  - cont diuresis with lasix 40mg daily (home dose)

## 2018-05-24 DIAGNOSIS — E09.9 DRUG OR CHEMICAL INDUCED DIABETES MELLITUS WITHOUT COMPLICATIONS: ICD-10-CM

## 2018-05-24 DIAGNOSIS — E11.9 TYPE 2 DIABETES MELLITUS WITHOUT COMPLICATIONS: ICD-10-CM

## 2018-05-24 LAB
BUN SERPL-MCNC: 27 MG/DL — HIGH (ref 7–23)
CALCIUM SERPL-MCNC: 9.3 MG/DL — SIGNIFICANT CHANGE UP (ref 8.4–10.5)
CHLORIDE SERPL-SCNC: 97 MMOL/L — LOW (ref 98–107)
CO2 SERPL-SCNC: 30 MMOL/L — SIGNIFICANT CHANGE UP (ref 22–31)
CREAT SERPL-MCNC: 1.2 MG/DL — SIGNIFICANT CHANGE UP (ref 0.5–1.3)
GLUCOSE BLDC GLUCOMTR-MCNC: 197 MG/DL — HIGH (ref 70–99)
GLUCOSE BLDC GLUCOMTR-MCNC: 240 MG/DL — HIGH (ref 70–99)
GLUCOSE BLDC GLUCOMTR-MCNC: 312 MG/DL — HIGH (ref 70–99)
GLUCOSE BLDC GLUCOMTR-MCNC: 367 MG/DL — HIGH (ref 70–99)
GLUCOSE SERPL-MCNC: 273 MG/DL — HIGH (ref 70–99)
HBA1C BLD-MCNC: 10.3 % — HIGH (ref 4–5.6)
LACTATE SERPL-SCNC: 3.4 MMOL/L — HIGH (ref 0.5–2)
POTASSIUM SERPL-MCNC: 4.7 MMOL/L — SIGNIFICANT CHANGE UP (ref 3.5–5.3)
POTASSIUM SERPL-SCNC: 4.7 MMOL/L — SIGNIFICANT CHANGE UP (ref 3.5–5.3)
SODIUM SERPL-SCNC: 139 MMOL/L — SIGNIFICANT CHANGE UP (ref 135–145)

## 2018-05-24 PROCEDURE — 99223 1ST HOSP IP/OBS HIGH 75: CPT

## 2018-05-24 RX ORDER — INSULIN LISPRO 100/ML
5 VIAL (ML) SUBCUTANEOUS
Qty: 0 | Refills: 0 | Status: DISCONTINUED | OUTPATIENT
Start: 2018-05-24 | End: 2018-05-24

## 2018-05-24 RX ORDER — INSULIN GLARGINE 100 [IU]/ML
15 INJECTION, SOLUTION SUBCUTANEOUS AT BEDTIME
Qty: 0 | Refills: 0 | Status: DISCONTINUED | OUTPATIENT
Start: 2018-05-24 | End: 2018-05-24

## 2018-05-24 RX ORDER — TRAMADOL HYDROCHLORIDE 50 MG/1
100 TABLET ORAL THREE TIMES A DAY
Qty: 0 | Refills: 0 | Status: COMPLETED | OUTPATIENT
Start: 2018-05-24 | End: 2018-05-31

## 2018-05-24 RX ORDER — INSULIN LISPRO 100/ML
6 VIAL (ML) SUBCUTANEOUS
Qty: 0 | Refills: 0 | Status: DISCONTINUED | OUTPATIENT
Start: 2018-05-24 | End: 2018-05-25

## 2018-05-24 RX ORDER — INSULIN GLARGINE 100 [IU]/ML
18 INJECTION, SOLUTION SUBCUTANEOUS AT BEDTIME
Qty: 0 | Refills: 0 | Status: DISCONTINUED | OUTPATIENT
Start: 2018-05-24 | End: 2018-05-26

## 2018-05-24 RX ADMIN — Medication 5 UNIT(S): at 13:21

## 2018-05-24 RX ADMIN — LORATADINE 10 MILLIGRAM(S): 10 TABLET ORAL at 13:19

## 2018-05-24 RX ADMIN — Medication 5 UNIT(S): at 18:35

## 2018-05-24 RX ADMIN — HEPARIN SODIUM 7500 UNIT(S): 5000 INJECTION INTRAVENOUS; SUBCUTANEOUS at 13:20

## 2018-05-24 RX ADMIN — Medication 250 MILLIGRAM(S): at 14:03

## 2018-05-24 RX ADMIN — Medication 50 MILLIGRAM(S): at 05:41

## 2018-05-24 RX ADMIN — Medication 125 MICROGRAM(S): at 05:36

## 2018-05-24 RX ADMIN — TRAMADOL HYDROCHLORIDE 50 MILLIGRAM(S): 50 TABLET ORAL at 13:27

## 2018-05-24 RX ADMIN — GABAPENTIN 300 MILLIGRAM(S): 400 CAPSULE ORAL at 05:42

## 2018-05-24 RX ADMIN — Medication 250 MILLIGRAM(S): at 13:33

## 2018-05-24 RX ADMIN — TRAMADOL HYDROCHLORIDE 100 MILLIGRAM(S): 50 TABLET ORAL at 21:15

## 2018-05-24 RX ADMIN — AMLODIPINE BESYLATE 5 MILLIGRAM(S): 2.5 TABLET ORAL at 05:42

## 2018-05-24 RX ADMIN — TRAMADOL HYDROCHLORIDE 50 MILLIGRAM(S): 50 TABLET ORAL at 05:41

## 2018-05-24 RX ADMIN — Medication 20 MILLIGRAM(S): at 05:41

## 2018-05-24 RX ADMIN — Medication 3 MILLILITER(S): at 10:23

## 2018-05-24 RX ADMIN — Medication 5 UNIT(S): at 09:22

## 2018-05-24 RX ADMIN — Medication 250 MILLIGRAM(S): at 21:46

## 2018-05-24 RX ADMIN — Medication 10: at 13:20

## 2018-05-24 RX ADMIN — Medication 40 MILLIGRAM(S): at 05:41

## 2018-05-24 RX ADMIN — TRAMADOL HYDROCHLORIDE 50 MILLIGRAM(S): 50 TABLET ORAL at 14:03

## 2018-05-24 RX ADMIN — SIMVASTATIN 20 MILLIGRAM(S): 20 TABLET, FILM COATED ORAL at 21:15

## 2018-05-24 RX ADMIN — INSULIN GLARGINE 18 UNIT(S): 100 INJECTION, SOLUTION SUBCUTANEOUS at 21:32

## 2018-05-24 RX ADMIN — TRAMADOL HYDROCHLORIDE 50 MILLIGRAM(S): 50 TABLET ORAL at 06:10

## 2018-05-24 RX ADMIN — Medication 5 MILLIGRAM(S): at 18:36

## 2018-05-24 RX ADMIN — Medication 250 MILLIGRAM(S): at 21:16

## 2018-05-24 RX ADMIN — Medication 8: at 09:23

## 2018-05-24 RX ADMIN — BUDESONIDE AND FORMOTEROL FUMARATE DIHYDRATE 2 PUFF(S): 160; 4.5 AEROSOL RESPIRATORY (INHALATION) at 10:42

## 2018-05-24 RX ADMIN — Medication 5 MILLIGRAM(S): at 05:41

## 2018-05-24 RX ADMIN — TRAMADOL HYDROCHLORIDE 100 MILLIGRAM(S): 50 TABLET ORAL at 21:46

## 2018-05-24 RX ADMIN — Medication 4: at 18:35

## 2018-05-24 RX ADMIN — CLOPIDOGREL BISULFATE 75 MILLIGRAM(S): 75 TABLET, FILM COATED ORAL at 13:19

## 2018-05-24 RX ADMIN — GABAPENTIN 300 MILLIGRAM(S): 400 CAPSULE ORAL at 13:19

## 2018-05-24 RX ADMIN — Medication 20 MILLIGRAM(S): at 21:15

## 2018-05-24 RX ADMIN — GABAPENTIN 300 MILLIGRAM(S): 400 CAPSULE ORAL at 21:16

## 2018-05-24 RX ADMIN — PANTOPRAZOLE SODIUM 40 MILLIGRAM(S): 20 TABLET, DELAYED RELEASE ORAL at 05:42

## 2018-05-24 NOTE — PHYSICAL THERAPY INITIAL EVALUATION ADULT - ACTIVE RANGE OF MOTION EXAMINATION, REHAB EVAL
bilateral  lower extremity Active ROM was WFL (within functional limits)/bilateral upper extremity Active ROM was WFL (within functional limits)/except active assisted  R shoulder flexion 0- 80 , L shoulder flexion 0- 40 degrees

## 2018-05-24 NOTE — PHYSICAL THERAPY INITIAL EVALUATION ADULT - GAIT DEVIATIONS NOTED, PT EVAL
decreased maci/decreased velocity of limb motion/decreased step length/decreased weight-shifting ability

## 2018-05-24 NOTE — PROGRESS NOTE ADULT - ATTENDING COMMENTS
62F PMH RA (on Orencia and chronic prednisone), COPD, pulmonary HTN, sleep apnea (not treated), HTN, hyperlipidemia, DM, TIA/CVA, hypothyroidism admitted in Sept 2017 for CAP and COPD exacerbation with right sided heart failure requiring diuresis. Presents for fever, productive cough with yellow sputum, SOB and chest congestion. Admitted for acute COPD exacerbation.    - CXR without definitive infiltrates  - has been afebrile since admission  - follow up sputum cx  - RVP negative  - cont oxygen supplementation  - prednisone, duonebs, levaquin for COPD exacerbation  - poorly controlled DM with hyperglycemia: start lantus with premeal insulin coverage; elevated hbA1C, endocrine follow up  - hypothyroid on labs, synthroid increased yesterday 5/23 to 125mcg daily  - cont diuresis with lasix 40mg daily (home dose)  - pt would benefit from pulmonary rehabilitation upon discharge for her underlying chronic obstructive lung disease

## 2018-05-24 NOTE — PROGRESS NOTE ADULT - ASSESSMENT
61 y/o F with COPD, pulmonary HTN, RA on prednisone, TIA, HTN p/w dyspnea fever and mucus production.

## 2018-05-24 NOTE — PHYSICAL THERAPY INITIAL EVALUATION ADULT - GENERAL OBSERVATIONS, REHAB EVAL
Patient received semi supine in bed , (+) O2 ,(+) obese, (+) arthritic changes of ngaa hands/fingers ,patient in NAD

## 2018-05-24 NOTE — CONSULT NOTE ADULT - PROBLEM SELECTOR RECOMMENDATION 9
While inpatient:  Lantus 18 u qhs  Humalog 6/6/6  moderate correction scales  Nutrition consult    DC plan:  Will request CDE to evaluate patient's ability to self administer insulin with pen given her severe arthritis. If able will consider dc on insulin pen based plan (prefer basal bolus if possible). If patient unable to self administer with pen will consider dc on oral regimen for trial and outpatient endocrine follow up [phone].

## 2018-05-24 NOTE — PROGRESS NOTE ADULT - SUBJECTIVE AND OBJECTIVE BOX
CHIEF COMPLAINT:    Interval Events:      REVIEW OF SYSTEMS:  Constitutional:   Eyes:  ENT:  CV:  Resp:  GI:  :  MSK:  Integumentary:  Neurological:  Psychiatric:  Endocrine:  Hematologic/Lymphatic:  Allergic/Immunologic:  [ ] All other systems negative  [ ] Unable to assess ROS because ________      OBJECTIVE:  ICU Vital Signs Last 24 Hrs  T(C): 36.3 (24 May 2018 05:35), Max: 37.2 (23 May 2018 13:35)  T(F): 97.4 (24 May 2018 05:35), Max: 98.9 (23 May 2018 13:35)  HR: 88 (24 May 2018 05:35) (86 - 98)  BP: 127/76 (24 May 2018 05:35) (127/76 - 143/84)  BP(mean): --  ABP: --  ABP(mean): --  RR: 19 (24 May 2018 05:35) (19 - 20)  SpO2: 99% (24 May 2018 05:35) (96% - 99%)    POCT Blood Glucose.: 365 mg/dL (23 May 2018 20:58)    HOSPITAL MEDICATIONS:  MEDICATIONS  (STANDING):  ALBUTerol    90 MICROgram(s) HFA Inhaler 1 Puff(s) Inhalation every 4 hours  ALBUTerol/ipratropium for Nebulization 3 milliLiter(s) Nebulizer every 6 hours  amLODIPine   Tablet 5 milliGRAM(s) Oral daily  buDESOnide 160 MICROgram(s)/formoterol 4.5 MICROgram(s) Inhaler 2 Puff(s) Inhalation two times a day  clopidogrel Tablet 75 milliGRAM(s) Oral daily  dextrose 5%. 1000 milliLiter(s) (50 mL/Hr) IV Continuous <Continuous>  dextrose 50% Injectable 12.5 Gram(s) IV Push once  dextrose 50% Injectable 25 Gram(s) IV Push once  dextrose 50% Injectable 25 Gram(s) IV Push once  furosemide    Tablet 20 milliGRAM(s) Oral at bedtime  furosemide    Tablet 40 milliGRAM(s) Oral daily  gabapentin 300 milliGRAM(s) Oral three times a day  heparin  Injectable 7500 Unit(s) SubCutaneous every 8 hours  insulin lispro (HumaLOG) corrective regimen sliding scale   SubCutaneous three times a day before meals  insulin lispro (HumaLOG) corrective regimen sliding scale   SubCutaneous at bedtime  levoFLOXacin IVPB 750 milliGRAM(s) IV Intermittent every 24 hours  levothyroxine 125 MICROGram(s) Oral daily  loratadine 10 milliGRAM(s) Oral daily  metoprolol succinate ER 50 milliGRAM(s) Oral daily  oxybutynin 5 milliGRAM(s) Oral two times a day  pantoprazole    Tablet 40 milliGRAM(s) Oral before breakfast  predniSONE   Tablet 20 milliGRAM(s) Oral daily  simvastatin 20 milliGRAM(s) Oral at bedtime  tiotropium 18 MICROgram(s) Capsule 1 Capsule(s) Inhalation daily    MEDICATIONS  (PRN):  dextrose 40% Gel 15 Gram(s) Oral once PRN Blood Glucose LESS THAN 70 milliGRAM(s)/deciliter  glucagon  Injectable 1 milliGRAM(s) IntraMuscular once PRN Glucose LESS THAN 70 milligrams/deciliter  naproxen 250 milliGRAM(s) Oral two times a day PRN moderate pain  traMADol 50 milliGRAM(s) Oral every 6 hours PRN Severe Pain (7 - 10)      LABS:                        12.5   10.47 )-----------( 295      ( 22 May 2018 18:00 )             39.2     05-23    139  |  98  |  27<H>  ----------------------------<  313<H>  4.6   |  24  |  1.08    Ca    9.2      23 May 2018 00:30    TPro  7.8  /  Alb  3.9  /  TBili  0.2  /  DBili  x   /  AST  40<H>  /  ALT  21  /  AlkPhos  79  05-22    PT/INR - ( 22 May 2018 18:00 )   PT: 11.1 SEC;   INR: 1.00          PTT - ( 22 May 2018 18:00 )  PTT:26.9 SEC      Venous Blood Gas:  05-22 @ 18:00  7.33/54/45/25/74.0  VBG Lactate: 3.9      MICROBIOLOGY:     RADIOLOGY:  [ ] Reviewed and interpreted by me    PULMONARY FUNCTION TESTS:    EKG: CHIEF COMPLAINT: Patient is a 62y old  Female who presents with a chief complaint of dyspnea, fever (22 May 2018 23:01)    Interval Events: none overnight      REVIEW OF SYSTEMS:  Constitutional: feels better this am  CV: denies  Resp: denies  GI: denies  [x] All other systems negative  [ ] Unable to assess ROS because ________      OBJECTIVE:  ICU Vital Signs Last 24 Hrs  T(C): 36.3 (24 May 2018 05:35), Max: 37.2 (23 May 2018 13:35)  T(F): 97.4 (24 May 2018 05:35), Max: 98.9 (23 May 2018 13:35)  HR: 88 (24 May 2018 05:35) (86 - 98)  BP: 127/76 (24 May 2018 05:35) (127/76 - 143/84)  BP(mean): --  ABP: --  ABP(mean): --  RR: 19 (24 May 2018 05:35) (19 - 20)  SpO2: 99% (24 May 2018 05:35) (96% - 99%)    POCT Blood Glucose.: 365 mg/dL (23 May 2018 20:58)    HOSPITAL MEDICATIONS:  MEDICATIONS  (STANDING):  ALBUTerol    90 MICROgram(s) HFA Inhaler 1 Puff(s) Inhalation every 4 hours  ALBUTerol/ipratropium for Nebulization 3 milliLiter(s) Nebulizer every 6 hours  amLODIPine   Tablet 5 milliGRAM(s) Oral daily  buDESOnide 160 MICROgram(s)/formoterol 4.5 MICROgram(s) Inhaler 2 Puff(s) Inhalation two times a day  clopidogrel Tablet 75 milliGRAM(s) Oral daily  dextrose 5%. 1000 milliLiter(s) (50 mL/Hr) IV Continuous <Continuous>  dextrose 50% Injectable 12.5 Gram(s) IV Push once  dextrose 50% Injectable 25 Gram(s) IV Push once  dextrose 50% Injectable 25 Gram(s) IV Push once  furosemide    Tablet 20 milliGRAM(s) Oral at bedtime  furosemide    Tablet 40 milliGRAM(s) Oral daily  gabapentin 300 milliGRAM(s) Oral three times a day  heparin  Injectable 7500 Unit(s) SubCutaneous every 8 hours  insulin lispro (HumaLOG) corrective regimen sliding scale   SubCutaneous three times a day before meals  insulin lispro (HumaLOG) corrective regimen sliding scale   SubCutaneous at bedtime  levoFLOXacin IVPB 750 milliGRAM(s) IV Intermittent every 24 hours  levothyroxine 125 MICROGram(s) Oral daily  loratadine 10 milliGRAM(s) Oral daily  metoprolol succinate ER 50 milliGRAM(s) Oral daily  oxybutynin 5 milliGRAM(s) Oral two times a day  pantoprazole    Tablet 40 milliGRAM(s) Oral before breakfast  predniSONE   Tablet 20 milliGRAM(s) Oral daily  simvastatin 20 milliGRAM(s) Oral at bedtime  tiotropium 18 MICROgram(s) Capsule 1 Capsule(s) Inhalation daily    MEDICATIONS  (PRN):  dextrose 40% Gel 15 Gram(s) Oral once PRN Blood Glucose LESS THAN 70 milliGRAM(s)/deciliter  glucagon  Injectable 1 milliGRAM(s) IntraMuscular once PRN Glucose LESS THAN 70 milligrams/deciliter  naproxen 250 milliGRAM(s) Oral two times a day PRN moderate pain  traMADol 50 milliGRAM(s) Oral every 6 hours PRN Severe Pain (7 - 10)      LABS:             Basic Metabolic Panel in AM (05.24.18 @ 07:00)    Sodium, Serum: 139 mmol/L    Potassium, Serum: 4.7: SPECIMEN MILDLY HEMOLYZED mmol/L    Chloride, Serum: 97 mmol/L    Carbon Dioxide, Serum: 30 mmol/L    Blood Urea Nitrogen, Serum: 27 mg/dL    Creatinine, Serum: 1.20 mg/dL    Glucose, Serum: 273 mg/dL    Calcium, Total Serum: 9.3 mg/dL    eGFR if Non : 49: The units for eGFR are ml/min/1.73m2 (normalized body  surface area). The eGFR is calculated from a serum  creatinine using the CKD-EPI equation. Other variables  required for calculation are race, age and sex. Among  patients with chronic kidney disease (CKD), the eGFR is  useful in determining the stage of disease according to  KDOQI CKD classification. All eGFR results are reported  numerically with the following interpretation.    GFR  (ml/min/1.73 m2)          W/KIDNEY DAMAGE    W/O KIDNEY DMG  ==========================================================  >= 90.......................Stage 1..............Normal  60-89.......................Stage 2...........Decreased GFR  30-59.......................Stage 3..............Stage 3  15-29.......................Stage 4..............Stage 4  < 15........................Stage 5..............Stage 5    Each stage of CKD assumes that the associated GFR level  has been in effect for at least 3 months. Determination of  stages one and two (with eGFR > 59ml/min/m2) requires  estimation of kidney damage for at least 3 months as  defined by structural or functional abnormalities.    Limitations: All estimates of GFR will be less accurate  for patients at extremes of muscle mass (including but  not limited to frail elderly, critically ill, or cancer  patients), those with unusual diets, and those with  conditions associated with reduced secretion or  extrarenal elimination of creatinine. The eGFR equation  is not recommended for use in patients with unstable  creatinine levels. mL/min    eGFR if : 57 mL/min    Lactate, Blood (05.24.18 @ 08:45)    Lactate, Blood: 3.4 mmol/L        MICROBIOLOGY:       Culture - Respiratory with Gram Stain (05.23.18 @ 17:04)    Gram Stain Sputum:   WBC^White Blood Cells  QNTY CELLS IN GRAM STAIN: FEW (2+)  GPCCL^Gram Pos Cocci In Clusters  QUANTITY OF BACTERIA SEEN: MANY (4+)  YEAST^YEAST.  QUANTITY OF BACTERIA SEEN: RARE (1+)    Specimen Source: SPUTUM    Culture - Blood (05.22.18 @ 19:04)    Culture - Blood:   NO ORGANISMS ISOLATED  NO ORGANISMS ISOLATED AT 24 HOURS    Specimen Source: BLOOD VENOUS    Culture - Blood (05.22.18 @ 19:04)    Culture - Blood:   NO ORGANISMS ISOLATED  NO ORGANISMS ISOLATED AT 24 HOURS    Specimen Source: BLOOD PERIPHERAL

## 2018-05-25 LAB
BUN SERPL-MCNC: 35 MG/DL — HIGH (ref 7–23)
CALCIUM SERPL-MCNC: 8.9 MG/DL — SIGNIFICANT CHANGE UP (ref 8.4–10.5)
CHLORIDE SERPL-SCNC: 99 MMOL/L — SIGNIFICANT CHANGE UP (ref 98–107)
CO2 SERPL-SCNC: 28 MMOL/L — SIGNIFICANT CHANGE UP (ref 22–31)
CREAT SERPL-MCNC: 1.48 MG/DL — HIGH (ref 0.5–1.3)
GLUCOSE BLDC GLUCOMTR-MCNC: 140 MG/DL — HIGH (ref 70–99)
GLUCOSE BLDC GLUCOMTR-MCNC: 151 MG/DL — HIGH (ref 70–99)
GLUCOSE BLDC GLUCOMTR-MCNC: 254 MG/DL — HIGH (ref 70–99)
GLUCOSE BLDC GLUCOMTR-MCNC: 312 MG/DL — HIGH (ref 70–99)
GLUCOSE SERPL-MCNC: 128 MG/DL — HIGH (ref 70–99)
HCT VFR BLD CALC: 34.9 % — SIGNIFICANT CHANGE UP (ref 34.5–45)
HGB BLD-MCNC: 10.9 G/DL — LOW (ref 11.5–15.5)
LACTATE SERPL-SCNC: 1.7 MMOL/L — SIGNIFICANT CHANGE UP (ref 0.5–2)
MCHC RBC-ENTMCNC: 31.2 % — LOW (ref 32–36)
MCHC RBC-ENTMCNC: 31.7 PG — SIGNIFICANT CHANGE UP (ref 27–34)
MCV RBC AUTO: 101.5 FL — HIGH (ref 80–100)
NRBC # FLD: 0 — SIGNIFICANT CHANGE UP
PLATELET # BLD AUTO: 251 K/UL — SIGNIFICANT CHANGE UP (ref 150–400)
PMV BLD: 10.5 FL — SIGNIFICANT CHANGE UP (ref 7–13)
POTASSIUM SERPL-MCNC: 4.4 MMOL/L — SIGNIFICANT CHANGE UP (ref 3.5–5.3)
POTASSIUM SERPL-SCNC: 4.4 MMOL/L — SIGNIFICANT CHANGE UP (ref 3.5–5.3)
RBC # BLD: 3.44 M/UL — LOW (ref 3.8–5.2)
RBC # FLD: 14.6 % — HIGH (ref 10.3–14.5)
SODIUM SERPL-SCNC: 141 MMOL/L — SIGNIFICANT CHANGE UP (ref 135–145)
WBC # BLD: 10.89 K/UL — HIGH (ref 3.8–10.5)
WBC # FLD AUTO: 10.89 K/UL — HIGH (ref 3.8–10.5)

## 2018-05-25 PROCEDURE — 94375 RESPIRATORY FLOW VOLUME LOOP: CPT | Mod: 26

## 2018-05-25 PROCEDURE — 99232 SBSQ HOSP IP/OBS MODERATE 35: CPT

## 2018-05-25 PROCEDURE — 12345: CPT | Mod: NC

## 2018-05-25 RX ORDER — INSULIN LISPRO 100/ML
8 VIAL (ML) SUBCUTANEOUS
Qty: 0 | Refills: 0 | Status: DISCONTINUED | OUTPATIENT
Start: 2018-05-25 | End: 2018-05-26

## 2018-05-25 RX ORDER — POLYETHYLENE GLYCOL 3350 17 G/17G
17 POWDER, FOR SOLUTION ORAL DAILY
Qty: 0 | Refills: 0 | Status: DISCONTINUED | OUTPATIENT
Start: 2018-05-25 | End: 2018-05-31

## 2018-05-25 RX ORDER — IPRATROPIUM/ALBUTEROL SULFATE 18-103MCG
3 AEROSOL WITH ADAPTER (GRAM) INHALATION EVERY 6 HOURS
Qty: 0 | Refills: 0 | Status: DISCONTINUED | OUTPATIENT
Start: 2018-05-25 | End: 2018-05-26

## 2018-05-25 RX ORDER — CHLORHEXIDINE GLUCONATE 213 G/1000ML
1 SOLUTION TOPICAL
Qty: 0 | Refills: 0 | Status: DISCONTINUED | OUTPATIENT
Start: 2018-05-25 | End: 2018-05-31

## 2018-05-25 RX ADMIN — Medication 50 MILLIGRAM(S): at 05:08

## 2018-05-25 RX ADMIN — Medication 125 MICROGRAM(S): at 05:08

## 2018-05-25 RX ADMIN — GABAPENTIN 300 MILLIGRAM(S): 400 CAPSULE ORAL at 13:48

## 2018-05-25 RX ADMIN — LORATADINE 10 MILLIGRAM(S): 10 TABLET ORAL at 11:41

## 2018-05-25 RX ADMIN — BUDESONIDE AND FORMOTEROL FUMARATE DIHYDRATE 2 PUFF(S): 160; 4.5 AEROSOL RESPIRATORY (INHALATION) at 22:56

## 2018-05-25 RX ADMIN — GABAPENTIN 300 MILLIGRAM(S): 400 CAPSULE ORAL at 05:08

## 2018-05-25 RX ADMIN — Medication 10 MILLIGRAM(S): at 22:19

## 2018-05-25 RX ADMIN — TRAMADOL HYDROCHLORIDE 100 MILLIGRAM(S): 50 TABLET ORAL at 05:38

## 2018-05-25 RX ADMIN — INSULIN GLARGINE 18 UNIT(S): 100 INJECTION, SOLUTION SUBCUTANEOUS at 22:19

## 2018-05-25 RX ADMIN — Medication 40 MILLIGRAM(S): at 05:08

## 2018-05-25 RX ADMIN — SIMVASTATIN 20 MILLIGRAM(S): 20 TABLET, FILM COATED ORAL at 22:18

## 2018-05-25 RX ADMIN — Medication 6 UNIT(S): at 09:38

## 2018-05-25 RX ADMIN — Medication 5 MILLIGRAM(S): at 16:52

## 2018-05-25 RX ADMIN — Medication 6 UNIT(S): at 18:25

## 2018-05-25 RX ADMIN — TRAMADOL HYDROCHLORIDE 100 MILLIGRAM(S): 50 TABLET ORAL at 22:19

## 2018-05-25 RX ADMIN — Medication 6 UNIT(S): at 13:41

## 2018-05-25 RX ADMIN — Medication 250 MILLIGRAM(S): at 10:21

## 2018-05-25 RX ADMIN — AMLODIPINE BESYLATE 5 MILLIGRAM(S): 2.5 TABLET ORAL at 05:08

## 2018-05-25 RX ADMIN — Medication 5 MILLIGRAM(S): at 05:09

## 2018-05-25 RX ADMIN — Medication 2: at 18:25

## 2018-05-25 RX ADMIN — Medication 250 MILLIGRAM(S): at 09:51

## 2018-05-25 RX ADMIN — TRAMADOL HYDROCHLORIDE 100 MILLIGRAM(S): 50 TABLET ORAL at 14:20

## 2018-05-25 RX ADMIN — Medication 250 MILLIGRAM(S): at 14:20

## 2018-05-25 RX ADMIN — Medication 8: at 13:40

## 2018-05-25 RX ADMIN — Medication 20 MILLIGRAM(S): at 05:08

## 2018-05-25 RX ADMIN — CLOPIDOGREL BISULFATE 75 MILLIGRAM(S): 75 TABLET, FILM COATED ORAL at 11:41

## 2018-05-25 RX ADMIN — Medication 250 MILLIGRAM(S): at 13:50

## 2018-05-25 RX ADMIN — Medication 20 MILLIGRAM(S): at 22:19

## 2018-05-25 RX ADMIN — Medication 600 MILLIGRAM(S): at 16:52

## 2018-05-25 RX ADMIN — PANTOPRAZOLE SODIUM 40 MILLIGRAM(S): 20 TABLET, DELAYED RELEASE ORAL at 05:08

## 2018-05-25 RX ADMIN — CHLORHEXIDINE GLUCONATE 1 APPLICATION(S): 213 SOLUTION TOPICAL at 22:18

## 2018-05-25 RX ADMIN — POLYETHYLENE GLYCOL 3350 17 GRAM(S): 17 POWDER, FOR SOLUTION ORAL at 13:47

## 2018-05-25 RX ADMIN — TRAMADOL HYDROCHLORIDE 100 MILLIGRAM(S): 50 TABLET ORAL at 22:49

## 2018-05-25 RX ADMIN — BUDESONIDE AND FORMOTEROL FUMARATE DIHYDRATE 2 PUFF(S): 160; 4.5 AEROSOL RESPIRATORY (INHALATION) at 08:51

## 2018-05-25 RX ADMIN — Medication 6: at 09:38

## 2018-05-25 RX ADMIN — TRAMADOL HYDROCHLORIDE 100 MILLIGRAM(S): 50 TABLET ORAL at 13:47

## 2018-05-25 RX ADMIN — GABAPENTIN 300 MILLIGRAM(S): 400 CAPSULE ORAL at 22:19

## 2018-05-25 RX ADMIN — TRAMADOL HYDROCHLORIDE 100 MILLIGRAM(S): 50 TABLET ORAL at 05:08

## 2018-05-25 NOTE — PROGRESS NOTE ADULT - SUBJECTIVE AND OBJECTIVE BOX
Chief Complaint: Steroid induced DM    History:  She was seen by CDE and able to use insulin pens.  Tolerating po  no hypoglycemia    MEDICATIONS  (STANDING):  ALBUTerol    90 MICROgram(s) HFA Inhaler 1 Puff(s) Inhalation every 4 hours  amLODIPine   Tablet 5 milliGRAM(s) Oral daily  buDESOnide 160 MICROgram(s)/formoterol 4.5 MICROgram(s) Inhaler 2 Puff(s) Inhalation two times a day  chlorhexidine 4% Liquid 1 Application(s) Topical <User Schedule>  clopidogrel Tablet 75 milliGRAM(s) Oral daily  dextrose 5%. 1000 milliLiter(s) (50 mL/Hr) IV Continuous <Continuous>  dextrose 50% Injectable 12.5 Gram(s) IV Push once  dextrose 50% Injectable 25 Gram(s) IV Push once  dextrose 50% Injectable 25 Gram(s) IV Push once  furosemide    Tablet 20 milliGRAM(s) Oral at bedtime  furosemide    Tablet 40 milliGRAM(s) Oral daily  gabapentin 300 milliGRAM(s) Oral three times a day  guaiFENesin  milliGRAM(s) Oral every 12 hours  heparin  Injectable 7500 Unit(s) SubCutaneous every 8 hours  insulin glargine Injectable (LANTUS) 18 Unit(s) SubCutaneous at bedtime  insulin lispro (HumaLOG) corrective regimen sliding scale   SubCutaneous three times a day before meals  insulin lispro (HumaLOG) corrective regimen sliding scale   SubCutaneous at bedtime  insulin lispro Injectable (HumaLOG) 6 Unit(s) SubCutaneous three times a day before meals  levoFLOXacin IVPB 750 milliGRAM(s) IV Intermittent every 24 hours  levothyroxine 125 MICROGram(s) Oral daily  loratadine 10 milliGRAM(s) Oral daily  metoprolol succinate ER 50 milliGRAM(s) Oral daily  oxybutynin 5 milliGRAM(s) Oral two times a day  pantoprazole    Tablet 40 milliGRAM(s) Oral before breakfast  polyethylene glycol 3350 17 Gram(s) Oral daily  predniSONE   Tablet 20 milliGRAM(s) Oral daily  simvastatin 20 milliGRAM(s) Oral at bedtime  tiotropium 18 MICROgram(s) Capsule 1 Capsule(s) Inhalation daily  traMADol 100 milliGRAM(s) Oral three times a day    MEDICATIONS  (PRN):  ALBUTerol/ipratropium for Nebulization 3 milliLiter(s) Nebulizer every 6 hours PRN Shortness of Breath and/or Wheezing  bisacodyl Suppository 10 milliGRAM(s) Rectal daily PRN Constipation  dextrose 40% Gel 15 Gram(s) Oral once PRN Blood Glucose LESS THAN 70 milliGRAM(s)/deciliter  Diclofenac Topical Gel 1% 1 Application(s) 1 Application(s) Topical two times a day PRN pain  glucagon  Injectable 1 milliGRAM(s) IntraMuscular once PRN Glucose LESS THAN 70 milligrams/deciliter  naproxen 250 milliGRAM(s) Oral two times a day PRN moderate pain      Allergies    No Known Allergies    Intolerances      Review of Systems:    ALL OTHER SYSTEMS REVIEWED AND NEGATIVE      PHYSICAL EXAM:  VITALS: T(C): 36.8 (05-25-18 @ 11:36)  T(F): 98.3 (05-25-18 @ 11:36), Max: 98.3 (05-25-18 @ 11:36)  HR: 93 (05-25-18 @ 11:36) (89 - 93)  BP: 128/74 (05-25-18 @ 11:36) (128/74 - 139/65)  RR:  (18 - 20)  SpO2:  (96% - 99%)  Wt(kg): --  GENERAL: NAD, well-groomed, well-developed  EYES: No proptosis, no lid lag, anicteric  HEENT:  Atraumatic, Normocephalic, moist mucous membranes  MUSCULOSKELETAL: significant arthritic deformities of hand b/l  PSYCH: Alert and oriented x 3, normal affect, normal mood      CAPILLARY BLOOD GLUCOSE      POCT Blood Glucose.: 151 mg/dL (25 May 2018 17:57)  POCT Blood Glucose.: 312 mg/dL (25 May 2018 12:43)  POCT Blood Glucose.: 254 mg/dL (25 May 2018 08:46)  POCT Blood Glucose.: 197 mg/dL (24 May 2018 21:16)      05-25    141  |  99  |  35<H>  ----------------------------<  128<H>  4.4   |  28  |  1.48<H>    EGFR if : 44  EGFR if non : 38    Ca    8.9      05-25            Thyroid Function Tests:  05-22 @ 20:08 TSH 33.82 FreeT4 -- T3 -- Anti TPO -- Anti Thyroglobulin Ab -- TSI --  05-22 @ 18:00 TSH 31.16 FreeT4 -- T3 -- Anti TPO -- Anti Thyroglobulin Ab -- TSI --      Hemoglobin A1C, Whole Blood: 10.3 % <H> [4.0 - 5.6] (05-24-18 @ 07:00)

## 2018-05-25 NOTE — PROGRESS NOTE ADULT - SUBJECTIVE AND OBJECTIVE BOX
CHIEF COMPLAINT:    Interval Events:    REVIEW OF SYSTEMS:  Constitutional:   Eyes:  ENT:  CV:  Resp:  GI:  :  MSK:  Integumentary:  Neurological:  Psychiatric:  Endocrine:  Hematologic/Lymphatic:  Allergic/Immunologic:  [ ] All other systems negative  [ ] Unable to assess ROS because ________    OBJECTIVE:  ICU Vital Signs Last 24 Hrs  T(C): 35.9 (25 May 2018 05:11), Max: 37.3 (24 May 2018 12:36)  T(F): 96.6 (25 May 2018 05:11), Max: 99.1 (24 May 2018 12:36)  HR: 89 (25 May 2018 05:11) (84 - 95)  BP: 139/65 (25 May 2018 05:11) (113/73 - 139/65)  BP(mean): --  ABP: --  ABP(mean): --  RR: 20 (25 May 2018 05:11) (18 - 20)  SpO2: 99% (25 May 2018 05:11) (96% - 99%)        CAPILLARY BLOOD GLUCOSE      POCT Blood Glucose.: 254 mg/dL (25 May 2018 08:46)      PHYSICAL EXAM:  General:   HEENT:   Lymph Nodes:  Neck:   Respiratory:   Cardiovascular:   Abdomen:   Extremities:   Skin:   Neurological:  Psychiatry:    HOSPITAL MEDICATIONS:  MEDICATIONS  (STANDING):  ALBUTerol    90 MICROgram(s) HFA Inhaler 1 Puff(s) Inhalation every 4 hours  ALBUTerol/ipratropium for Nebulization 3 milliLiter(s) Nebulizer every 6 hours  amLODIPine   Tablet 5 milliGRAM(s) Oral daily  buDESOnide 160 MICROgram(s)/formoterol 4.5 MICROgram(s) Inhaler 2 Puff(s) Inhalation two times a day  clopidogrel Tablet 75 milliGRAM(s) Oral daily  dextrose 5%. 1000 milliLiter(s) (50 mL/Hr) IV Continuous <Continuous>  dextrose 50% Injectable 12.5 Gram(s) IV Push once  dextrose 50% Injectable 25 Gram(s) IV Push once  dextrose 50% Injectable 25 Gram(s) IV Push once  furosemide    Tablet 20 milliGRAM(s) Oral at bedtime  furosemide    Tablet 40 milliGRAM(s) Oral daily  gabapentin 300 milliGRAM(s) Oral three times a day  heparin  Injectable 7500 Unit(s) SubCutaneous every 8 hours  insulin glargine Injectable (LANTUS) 18 Unit(s) SubCutaneous at bedtime  insulin lispro (HumaLOG) corrective regimen sliding scale   SubCutaneous three times a day before meals  insulin lispro (HumaLOG) corrective regimen sliding scale   SubCutaneous at bedtime  insulin lispro Injectable (HumaLOG) 6 Unit(s) SubCutaneous three times a day before meals  levoFLOXacin IVPB 750 milliGRAM(s) IV Intermittent every 24 hours  levothyroxine 125 MICROGram(s) Oral daily  loratadine 10 milliGRAM(s) Oral daily  metoprolol succinate ER 50 milliGRAM(s) Oral daily  oxybutynin 5 milliGRAM(s) Oral two times a day  pantoprazole    Tablet 40 milliGRAM(s) Oral before breakfast  predniSONE   Tablet 20 milliGRAM(s) Oral daily  simvastatin 20 milliGRAM(s) Oral at bedtime  tiotropium 18 MICROgram(s) Capsule 1 Capsule(s) Inhalation daily  traMADol 100 milliGRAM(s) Oral three times a day    MEDICATIONS  (PRN):  dextrose 40% Gel 15 Gram(s) Oral once PRN Blood Glucose LESS THAN 70 milliGRAM(s)/deciliter  Diclofenac Topical Gel 1% 1 Application(s) 1 Application(s) Topical two times a day PRN pain  glucagon  Injectable 1 milliGRAM(s) IntraMuscular once PRN Glucose LESS THAN 70 milligrams/deciliter  naproxen 250 milliGRAM(s) Oral two times a day PRN moderate pain      LABS:                        10.9   10.89 )-----------( 251      ( 25 May 2018 05:22 )             34.9     05-25    141  |  99  |  35<H>  ----------------------------<  128<H>  4.4   |  28  |  1.48<H>    Ca    8.9      25 May 2018 05:22                MICROBIOLOGY:     RADIOLOGY:  [ ] Reviewed and interpreted by me    PULMONARY FUNCTION TESTS:    EKG: CHIEF COMPLAINT: Patient is a 62y old  Female who presents with a chief complaint of dyspnea, fever (22 May 2018 23:01)      Interval Events: None     REVIEW OF SYSTEMS:  Constitutional: No fever or chills   Eyes:  ENT:  CV: Denies   Resp: + cough   GI: + constipation   [x ] All other systems negative      OBJECTIVE:  ICU Vital Signs Last 24 Hrs  T(C): 35.9 (25 May 2018 05:11), Max: 37.3 (24 May 2018 12:36)  T(F): 96.6 (25 May 2018 05:11), Max: 99.1 (24 May 2018 12:36)  HR: 89 (25 May 2018 05:11) (84 - 95)  BP: 139/65 (25 May 2018 05:11) (113/73 - 139/65)  BP(mean): --  ABP: --  ABP(mean): --  RR: 20 (25 May 2018 05:11) (18 - 20)  SpO2: 99% (25 May 2018 05:11) (96% - 99%)        CAPILLARY BLOOD GLUCOSE      POCT Blood Glucose.: 254 mg/dL (25 May 2018 08:46)    HOSPITAL MEDICATIONS:  MEDICATIONS  (STANDING):  ALBUTerol    90 MICROgram(s) HFA Inhaler 1 Puff(s) Inhalation every 4 hours  ALBUTerol/ipratropium for Nebulization 3 milliLiter(s) Nebulizer every 6 hours  amLODIPine   Tablet 5 milliGRAM(s) Oral daily  buDESOnide 160 MICROgram(s)/formoterol 4.5 MICROgram(s) Inhaler 2 Puff(s) Inhalation two times a day  clopidogrel Tablet 75 milliGRAM(s) Oral daily  dextrose 5%. 1000 milliLiter(s) (50 mL/Hr) IV Continuous <Continuous>  dextrose 50% Injectable 12.5 Gram(s) IV Push once  dextrose 50% Injectable 25 Gram(s) IV Push once  dextrose 50% Injectable 25 Gram(s) IV Push once  furosemide    Tablet 20 milliGRAM(s) Oral at bedtime  furosemide    Tablet 40 milliGRAM(s) Oral daily  gabapentin 300 milliGRAM(s) Oral three times a day  heparin  Injectable 7500 Unit(s) SubCutaneous every 8 hours  insulin glargine Injectable (LANTUS) 18 Unit(s) SubCutaneous at bedtime  insulin lispro (HumaLOG) corrective regimen sliding scale   SubCutaneous three times a day before meals  insulin lispro (HumaLOG) corrective regimen sliding scale   SubCutaneous at bedtime  insulin lispro Injectable (HumaLOG) 6 Unit(s) SubCutaneous three times a day before meals  levoFLOXacin IVPB 750 milliGRAM(s) IV Intermittent every 24 hours  levothyroxine 125 MICROGram(s) Oral daily  loratadine 10 milliGRAM(s) Oral daily  metoprolol succinate ER 50 milliGRAM(s) Oral daily  oxybutynin 5 milliGRAM(s) Oral two times a day  pantoprazole    Tablet 40 milliGRAM(s) Oral before breakfast  predniSONE   Tablet 20 milliGRAM(s) Oral daily  simvastatin 20 milliGRAM(s) Oral at bedtime  tiotropium 18 MICROgram(s) Capsule 1 Capsule(s) Inhalation daily  traMADol 100 milliGRAM(s) Oral three times a day    MEDICATIONS  (PRN):  dextrose 40% Gel 15 Gram(s) Oral once PRN Blood Glucose LESS THAN 70 milliGRAM(s)/deciliter  Diclofenac Topical Gel 1% 1 Application(s) 1 Application(s) Topical two times a day PRN pain  glucagon  Injectable 1 milliGRAM(s) IntraMuscular once PRN Glucose LESS THAN 70 milligrams/deciliter  naproxen 250 milliGRAM(s) Oral two times a day PRN moderate pain      LABS:                        10.9   10.89 )-----------( 251      ( 25 May 2018 05:22 )             34.9     05-25    141  |  99  |  35<H>  ----------------------------<  128<H>  4.4   |  28  |  1.48<H>    Ca    8.9      25 May 2018 05:22                MICROBIOLOGY:     RADIOLOGY:  [ ] Reviewed and interpreted by me    PULMONARY FUNCTION TESTS:    EKG:

## 2018-05-25 NOTE — PROGRESS NOTE ADULT - PROBLEM SELECTOR PLAN 6
- TSH 33.82  - increase synthroid to 125mcg  - repeat TFTs in 4-6 weeks - TSH 33.82  - increased synthroid to 125mcg  - repeat TFTs in 4-6 weeks

## 2018-05-25 NOTE — PROGRESS NOTE ADULT - ATTENDING COMMENTS
62F PMH RA (on Orencia and chronic prednisone), COPD, pulmonary HTN, sleep apnea (not treated), HTN, hyperlipidemia, DM, TIA/CVA, hypothyroidism admitted in Sept 2017 for CAP and COPD exacerbation with right sided heart failure requiring diuresis. Presents for fever, productive cough with yellow sputum, SOB and chest congestion. Admitted for acute COPD exacerbation.      - RVP negative  - cont oxygen supplementation  - prednisone, duonebs, levaquin to complete 5 day course for COPD exacerbation  - CXR without definitive infiltrates, afebrile since admission  - sputum culture with normal respiratory jackelyn   - poorly controlled DM with hyperglycemia: started on lantus with premeal insulin coverage  - appreciate endocrine follow up: with severe arthritis pt may have difficulty with self administering insulin, will have education and see if pt able to self administer; if not will have to opt for oral regimen for DM control  - hypothyroid on labs, synthroid increased on 5/23 to 125mcg daily  - cont diuresis with lasix 40mg daily (home dose)  - pt would benefit from pulmonary rehabilitation upon discharge for her underlying chronic obstructive lung disease  - awaiting spirometry to be performed

## 2018-05-26 DIAGNOSIS — N17.9 ACUTE KIDNEY FAILURE, UNSPECIFIED: ICD-10-CM

## 2018-05-26 LAB
BACTERIA SPT RESP CULT: SIGNIFICANT CHANGE UP
BASE EXCESS BLDV CALC-SCNC: 3.6 MMOL/L — SIGNIFICANT CHANGE UP
BUN SERPL-MCNC: 56 MG/DL — HIGH (ref 7–23)
CALCIUM SERPL-MCNC: 9.3 MG/DL — SIGNIFICANT CHANGE UP (ref 8.4–10.5)
CHLORIDE SERPL-SCNC: 95 MMOL/L — LOW (ref 98–107)
CO2 SERPL-SCNC: 27 MMOL/L — SIGNIFICANT CHANGE UP (ref 22–31)
CREAT SERPL-MCNC: 1.96 MG/DL — HIGH (ref 0.5–1.3)
GAS PNL BLDV: 138 MMOL/L — SIGNIFICANT CHANGE UP (ref 136–146)
GLUCOSE BLDC GLUCOMTR-MCNC: 177 MG/DL — HIGH (ref 70–99)
GLUCOSE BLDC GLUCOMTR-MCNC: 243 MG/DL — HIGH (ref 70–99)
GLUCOSE BLDC GLUCOMTR-MCNC: 246 MG/DL — HIGH (ref 70–99)
GLUCOSE BLDC GLUCOMTR-MCNC: 286 MG/DL — HIGH (ref 70–99)
GLUCOSE BLDV-MCNC: 179 — HIGH (ref 70–99)
GLUCOSE SERPL-MCNC: 182 MG/DL — HIGH (ref 70–99)
HCO3 BLDV-SCNC: 26 MMOL/L — SIGNIFICANT CHANGE UP (ref 20–27)
HCT VFR BLD CALC: 37.7 % — SIGNIFICANT CHANGE UP (ref 34.5–45)
HCT VFR BLDV CALC: 36.9 % — SIGNIFICANT CHANGE UP (ref 34.5–45)
HGB BLD-MCNC: 11.6 G/DL — SIGNIFICANT CHANGE UP (ref 11.5–15.5)
HGB BLDV-MCNC: 12 G/DL — SIGNIFICANT CHANGE UP (ref 11.5–15.5)
MCHC RBC-ENTMCNC: 30.8 % — LOW (ref 32–36)
MCHC RBC-ENTMCNC: 30.9 PG — SIGNIFICANT CHANGE UP (ref 27–34)
MCV RBC AUTO: 100.5 FL — HIGH (ref 80–100)
NRBC # FLD: 0 — SIGNIFICANT CHANGE UP
PCO2 BLDV: 61 MMHG — HIGH (ref 41–51)
PH BLDV: 7.31 PH — LOW (ref 7.32–7.43)
PLATELET # BLD AUTO: 263 K/UL — SIGNIFICANT CHANGE UP (ref 150–400)
PMV BLD: 10.4 FL — SIGNIFICANT CHANGE UP (ref 7–13)
PO2 BLDV: 59 MMHG — HIGH (ref 35–40)
POTASSIUM BLDV-SCNC: 4.4 MMOL/L — SIGNIFICANT CHANGE UP (ref 3.4–4.5)
POTASSIUM SERPL-MCNC: 4.7 MMOL/L — SIGNIFICANT CHANGE UP (ref 3.5–5.3)
POTASSIUM SERPL-SCNC: 4.7 MMOL/L — SIGNIFICANT CHANGE UP (ref 3.5–5.3)
RBC # BLD: 3.75 M/UL — LOW (ref 3.8–5.2)
RBC # FLD: 14.6 % — HIGH (ref 10.3–14.5)
SAO2 % BLDV: 88.2 % — HIGH (ref 60–85)
SODIUM SERPL-SCNC: 139 MMOL/L — SIGNIFICANT CHANGE UP (ref 135–145)
WBC # BLD: 10.6 K/UL — HIGH (ref 3.8–10.5)
WBC # FLD AUTO: 10.6 K/UL — HIGH (ref 3.8–10.5)

## 2018-05-26 PROCEDURE — 99232 SBSQ HOSP IP/OBS MODERATE 35: CPT

## 2018-05-26 PROCEDURE — 99233 SBSQ HOSP IP/OBS HIGH 50: CPT | Mod: GC

## 2018-05-26 RX ORDER — GABAPENTIN 400 MG/1
300 CAPSULE ORAL
Qty: 0 | Refills: 0 | Status: DISCONTINUED | OUTPATIENT
Start: 2018-05-26 | End: 2018-05-31

## 2018-05-26 RX ORDER — INSULIN LISPRO 100/ML
10 VIAL (ML) SUBCUTANEOUS
Qty: 0 | Refills: 0 | Status: DISCONTINUED | OUTPATIENT
Start: 2018-05-26 | End: 2018-05-27

## 2018-05-26 RX ORDER — INSULIN LISPRO 100/ML
8 VIAL (ML) SUBCUTANEOUS
Qty: 0 | Refills: 0 | Status: DISCONTINUED | OUTPATIENT
Start: 2018-05-26 | End: 2018-05-31

## 2018-05-26 RX ORDER — SODIUM CHLORIDE 0.65 %
1 AEROSOL, SPRAY (ML) NASAL
Qty: 0 | Refills: 0 | Status: DISCONTINUED | OUTPATIENT
Start: 2018-05-26 | End: 2018-05-28

## 2018-05-26 RX ORDER — ACETAMINOPHEN 500 MG
650 TABLET ORAL EVERY 6 HOURS
Qty: 0 | Refills: 0 | Status: DISCONTINUED | OUTPATIENT
Start: 2018-05-26 | End: 2018-05-31

## 2018-05-26 RX ORDER — INSULIN GLARGINE 100 [IU]/ML
20 INJECTION, SOLUTION SUBCUTANEOUS AT BEDTIME
Qty: 0 | Refills: 0 | Status: DISCONTINUED | OUTPATIENT
Start: 2018-05-26 | End: 2018-05-27

## 2018-05-26 RX ORDER — SODIUM CHLORIDE 9 MG/ML
3 INJECTION INTRAMUSCULAR; INTRAVENOUS; SUBCUTANEOUS EVERY 6 HOURS
Qty: 0 | Refills: 0 | Status: DISCONTINUED | OUTPATIENT
Start: 2018-05-26 | End: 2018-05-31

## 2018-05-26 RX ORDER — KETOTIFEN FUMARATE 0.34 MG/ML
1 SOLUTION OPHTHALMIC
Qty: 0 | Refills: 0 | Status: DISCONTINUED | OUTPATIENT
Start: 2018-05-26 | End: 2018-05-31

## 2018-05-26 RX ADMIN — GABAPENTIN 300 MILLIGRAM(S): 400 CAPSULE ORAL at 13:30

## 2018-05-26 RX ADMIN — Medication 650 MILLIGRAM(S): at 13:29

## 2018-05-26 RX ADMIN — PANTOPRAZOLE SODIUM 40 MILLIGRAM(S): 20 TABLET, DELAYED RELEASE ORAL at 06:12

## 2018-05-26 RX ADMIN — Medication 4: at 09:31

## 2018-05-26 RX ADMIN — TRAMADOL HYDROCHLORIDE 100 MILLIGRAM(S): 50 TABLET ORAL at 22:01

## 2018-05-26 RX ADMIN — Medication 2: at 18:43

## 2018-05-26 RX ADMIN — Medication 650 MILLIGRAM(S): at 18:52

## 2018-05-26 RX ADMIN — TRAMADOL HYDROCHLORIDE 100 MILLIGRAM(S): 50 TABLET ORAL at 21:31

## 2018-05-26 RX ADMIN — Medication 8 UNIT(S): at 13:31

## 2018-05-26 RX ADMIN — TRAMADOL HYDROCHLORIDE 100 MILLIGRAM(S): 50 TABLET ORAL at 05:19

## 2018-05-26 RX ADMIN — AMLODIPINE BESYLATE 5 MILLIGRAM(S): 2.5 TABLET ORAL at 05:19

## 2018-05-26 RX ADMIN — SIMVASTATIN 20 MILLIGRAM(S): 20 TABLET, FILM COATED ORAL at 21:31

## 2018-05-26 RX ADMIN — TRAMADOL HYDROCHLORIDE 100 MILLIGRAM(S): 50 TABLET ORAL at 13:29

## 2018-05-26 RX ADMIN — LORATADINE 10 MILLIGRAM(S): 10 TABLET ORAL at 13:31

## 2018-05-26 RX ADMIN — GABAPENTIN 300 MILLIGRAM(S): 400 CAPSULE ORAL at 17:51

## 2018-05-26 RX ADMIN — Medication 20 MILLIGRAM(S): at 21:31

## 2018-05-26 RX ADMIN — Medication 5 MILLIGRAM(S): at 05:18

## 2018-05-26 RX ADMIN — Medication 20 MILLIGRAM(S): at 05:18

## 2018-05-26 RX ADMIN — INSULIN GLARGINE 20 UNIT(S): 100 INJECTION, SOLUTION SUBCUTANEOUS at 21:31

## 2018-05-26 RX ADMIN — Medication 8 UNIT(S): at 18:43

## 2018-05-26 RX ADMIN — Medication 600 MILLIGRAM(S): at 17:52

## 2018-05-26 RX ADMIN — Medication 600 MILLIGRAM(S): at 05:19

## 2018-05-26 RX ADMIN — Medication 6: at 13:31

## 2018-05-26 RX ADMIN — GABAPENTIN 300 MILLIGRAM(S): 400 CAPSULE ORAL at 05:19

## 2018-05-26 RX ADMIN — Medication 650 MILLIGRAM(S): at 23:33

## 2018-05-26 RX ADMIN — CHLORHEXIDINE GLUCONATE 1 APPLICATION(S): 213 SOLUTION TOPICAL at 09:32

## 2018-05-26 RX ADMIN — Medication 8 UNIT(S): at 09:32

## 2018-05-26 RX ADMIN — TRAMADOL HYDROCHLORIDE 100 MILLIGRAM(S): 50 TABLET ORAL at 05:49

## 2018-05-26 RX ADMIN — TRAMADOL HYDROCHLORIDE 100 MILLIGRAM(S): 50 TABLET ORAL at 14:20

## 2018-05-26 RX ADMIN — Medication 5 MILLIGRAM(S): at 17:52

## 2018-05-26 RX ADMIN — Medication 650 MILLIGRAM(S): at 14:20

## 2018-05-26 RX ADMIN — BUDESONIDE AND FORMOTEROL FUMARATE DIHYDRATE 2 PUFF(S): 160; 4.5 AEROSOL RESPIRATORY (INHALATION) at 09:22

## 2018-05-26 RX ADMIN — Medication 125 MICROGRAM(S): at 05:19

## 2018-05-26 RX ADMIN — CLOPIDOGREL BISULFATE 75 MILLIGRAM(S): 75 TABLET, FILM COATED ORAL at 13:31

## 2018-05-26 RX ADMIN — SODIUM CHLORIDE 3 MILLILITER(S): 9 INJECTION INTRAMUSCULAR; INTRAVENOUS; SUBCUTANEOUS at 16:04

## 2018-05-26 RX ADMIN — Medication 40 MILLIGRAM(S): at 05:19

## 2018-05-26 RX ADMIN — Medication 50 MILLIGRAM(S): at 05:18

## 2018-05-26 RX ADMIN — Medication 650 MILLIGRAM(S): at 18:10

## 2018-05-26 NOTE — PROGRESS NOTE ADULT - SUBJECTIVE AND OBJECTIVE BOX
CHIEF COMPLAINT:    Interval Events:    REVIEW OF SYSTEMS:  Constitutional:   Eyes:  ENT:  CV:  Resp:  GI:  :  MSK:  Integumentary:  Neurological:  Psychiatric:  Endocrine:  Hematologic/Lymphatic:  Allergic/Immunologic:  [ ] All other systems negative  [ ] Unable to assess ROS because ________    OBJECTIVE:  ICU Vital Signs Last 24 Hrs  T(C): 36.9 (26 May 2018 05:11), Max: 37.1 (25 May 2018 22:05)  T(F): 98.5 (26 May 2018 05:11), Max: 98.7 (25 May 2018 22:05)  HR: 85 (26 May 2018 05:11) (85 - 93)  BP: 123/90 (26 May 2018 05:11) (123/90 - 130/91)  BP(mean): --  ABP: --  ABP(mean): --  RR: 18 (26 May 2018 05:11) (18 - 18)  SpO2: 96% (26 May 2018 05:11) (96% - 96%)        CAPILLARY BLOOD GLUCOSE      POCT Blood Glucose.: 140 mg/dL (25 May 2018 21:33)      PHYSICAL EXAM:  General:   HEENT:   Lymph Nodes:  Neck:   Respiratory:   Cardiovascular:   Abdomen:   Extremities:   Skin:   Neurological:  Psychiatry:    HOSPITAL MEDICATIONS:  MEDICATIONS  (STANDING):  ALBUTerol    90 MICROgram(s) HFA Inhaler 1 Puff(s) Inhalation every 4 hours  amLODIPine   Tablet 5 milliGRAM(s) Oral daily  buDESOnide 160 MICROgram(s)/formoterol 4.5 MICROgram(s) Inhaler 2 Puff(s) Inhalation two times a day  chlorhexidine 4% Liquid 1 Application(s) Topical <User Schedule>  clopidogrel Tablet 75 milliGRAM(s) Oral daily  dextrose 5%. 1000 milliLiter(s) (50 mL/Hr) IV Continuous <Continuous>  dextrose 50% Injectable 12.5 Gram(s) IV Push once  dextrose 50% Injectable 25 Gram(s) IV Push once  dextrose 50% Injectable 25 Gram(s) IV Push once  furosemide    Tablet 20 milliGRAM(s) Oral at bedtime  furosemide    Tablet 40 milliGRAM(s) Oral daily  gabapentin 300 milliGRAM(s) Oral three times a day  guaiFENesin  milliGRAM(s) Oral every 12 hours  heparin  Injectable 7500 Unit(s) SubCutaneous every 8 hours  insulin glargine Injectable (LANTUS) 18 Unit(s) SubCutaneous at bedtime  insulin lispro (HumaLOG) corrective regimen sliding scale   SubCutaneous three times a day before meals  insulin lispro (HumaLOG) corrective regimen sliding scale   SubCutaneous at bedtime  insulin lispro Injectable (HumaLOG) 8 Unit(s) SubCutaneous three times a day before meals  levoFLOXacin IVPB 750 milliGRAM(s) IV Intermittent every 24 hours  levothyroxine 125 MICROGram(s) Oral daily  loratadine 10 milliGRAM(s) Oral daily  metoprolol succinate ER 50 milliGRAM(s) Oral daily  oxybutynin 5 milliGRAM(s) Oral two times a day  pantoprazole    Tablet 40 milliGRAM(s) Oral before breakfast  polyethylene glycol 3350 17 Gram(s) Oral daily  predniSONE   Tablet 20 milliGRAM(s) Oral daily  simvastatin 20 milliGRAM(s) Oral at bedtime  tiotropium 18 MICROgram(s) Capsule 1 Capsule(s) Inhalation daily  traMADol 100 milliGRAM(s) Oral three times a day    MEDICATIONS  (PRN):  ALBUTerol/ipratropium for Nebulization 3 milliLiter(s) Nebulizer every 6 hours PRN Shortness of Breath and/or Wheezing  bisacodyl Suppository 10 milliGRAM(s) Rectal daily PRN Constipation  dextrose 40% Gel 15 Gram(s) Oral once PRN Blood Glucose LESS THAN 70 milliGRAM(s)/deciliter  Diclofenac Topical Gel 1% 1 Application(s) 1 Application(s) Topical two times a day PRN pain  glucagon  Injectable 1 milliGRAM(s) IntraMuscular once PRN Glucose LESS THAN 70 milligrams/deciliter  naproxen 250 milliGRAM(s) Oral two times a day PRN moderate pain      LABS:                        11.6   10.60 )-----------( 263      ( 26 May 2018 07:00 )             37.7     05-25    141  |  99  |  35<H>  ----------------------------<  128<H>  4.4   |  28  |  1.48<H>    Ca    8.9      25 May 2018 05:22            Venous Blood Gas:  05-26 @ 07:00  7.31/61/59/26/88.2  VBG Lactate: --      MICROBIOLOGY:     RADIOLOGY:  [ ] Reviewed and interpreted by me    PULMONARY FUNCTION TESTS:    EKG:    I&O's Summary CHIEF COMPLAINT:    Interval Events: No new overnight events    REVIEW OF SYSTEMS:  Constitutional: No acute distrss  CV: Denies  Resp: occasional cough  GI: No C/o  [X ] All other systems negative    OBJECTIVE:  ICU Vital Signs Last 24 Hrs  T(C): 36.9 (26 May 2018 05:11), Max: 37.1 (25 May 2018 22:05)  T(F): 98.5 (26 May 2018 05:11), Max: 98.7 (25 May 2018 22:05)  HR: 85 (26 May 2018 05:11) (85 - 93)  BP: 123/90 (26 May 2018 05:11) (123/90 - 130/91)  BP(mean): --  ABP: --  ABP(mean): --  RR: 18 (26 May 2018 05:11) (18 - 18)  SpO2: 96% (26 May 2018 05:11) (96% - 96%)    CAPILLARY BLOOD GLUCOSE    POCT Blood Glucose.: 140 mg/dL (25 May 2018 21:33)      HOSPITAL MEDICATIONS:  MEDICATIONS  (STANDING):  ALBUTerol    90 MICROgram(s) HFA Inhaler 1 Puff(s) Inhalation every 4 hours  amLODIPine   Tablet 5 milliGRAM(s) Oral daily  buDESOnide 160 MICROgram(s)/formoterol 4.5 MICROgram(s) Inhaler 2 Puff(s) Inhalation two times a day  chlorhexidine 4% Liquid 1 Application(s) Topical <User Schedule>  clopidogrel Tablet 75 milliGRAM(s) Oral daily  dextrose 5%. 1000 milliLiter(s) (50 mL/Hr) IV Continuous <Continuous>  dextrose 50% Injectable 12.5 Gram(s) IV Push once  dextrose 50% Injectable 25 Gram(s) IV Push once  dextrose 50% Injectable 25 Gram(s) IV Push once  furosemide    Tablet 20 milliGRAM(s) Oral at bedtime  furosemide    Tablet 40 milliGRAM(s) Oral daily  gabapentin 300 milliGRAM(s) Oral three times a day  guaiFENesin  milliGRAM(s) Oral every 12 hours  heparin  Injectable 7500 Unit(s) SubCutaneous every 8 hours  insulin glargine Injectable (LANTUS) 18 Unit(s) SubCutaneous at bedtime  insulin lispro (HumaLOG) corrective regimen sliding scale   SubCutaneous three times a day before meals  insulin lispro (HumaLOG) corrective regimen sliding scale   SubCutaneous at bedtime  insulin lispro Injectable (HumaLOG) 8 Unit(s) SubCutaneous three times a day before meals  levoFLOXacin IVPB 750 milliGRAM(s) IV Intermittent every 24 hours  levothyroxine 125 MICROGram(s) Oral daily  loratadine 10 milliGRAM(s) Oral daily  metoprolol succinate ER 50 milliGRAM(s) Oral daily  oxybutynin 5 milliGRAM(s) Oral two times a day  pantoprazole    Tablet 40 milliGRAM(s) Oral before breakfast  polyethylene glycol 3350 17 Gram(s) Oral daily  predniSONE   Tablet 20 milliGRAM(s) Oral daily  simvastatin 20 milliGRAM(s) Oral at bedtime  tiotropium 18 MICROgram(s) Capsule 1 Capsule(s) Inhalation daily  traMADol 100 milliGRAM(s) Oral three times a day    MEDICATIONS  (PRN):  ALBUTerol/ipratropium for Nebulization 3 milliLiter(s) Nebulizer every 6 hours PRN Shortness of Breath and/or Wheezing  bisacodyl Suppository 10 milliGRAM(s) Rectal daily PRN Constipation  dextrose 40% Gel 15 Gram(s) Oral once PRN Blood Glucose LESS THAN 70 milliGRAM(s)/deciliter  Diclofenac Topical Gel 1% 1 Application(s) 1 Application(s) Topical two times a day PRN pain  glucagon  Injectable 1 milliGRAM(s) IntraMuscular once PRN Glucose LESS THAN 70 milligrams/deciliter  naproxen 250 milliGRAM(s) Oral two times a day PRN moderate pain      LABS:                        11.6   10.60 )-----------( 263      ( 26 May 2018 07:00 )             37.7     05-25    141  |  99  |  35<H>  ----------------------------<  128<H>  4.4   |  28  |  1.48<H>    Ca    8.9      25 May 2018 05:22            Venous Blood Gas:  05-26 @ 07:00  7.31/61/59/26/88.2  VBG Lactate: --      MICROBIOLOGY:     RADIOLOGY:  [ ] Reviewed and interpreted by me    PULMONARY FUNCTION TESTS:    EKG:    I&O's Summary

## 2018-05-26 NOTE — PROGRESS NOTE ADULT - SUBJECTIVE AND OBJECTIVE BOX
Chief Complaint/Follow-up on:     Subjective:    MEDICATIONS  (STANDING):  acetaminophen   Tablet. 650 milliGRAM(s) Oral every 6 hours  ALBUTerol    90 MICROgram(s) HFA Inhaler 1 Puff(s) Inhalation every 4 hours  amLODIPine   Tablet 5 milliGRAM(s) Oral daily  buDESOnide 160 MICROgram(s)/formoterol 4.5 MICROgram(s) Inhaler 2 Puff(s) Inhalation two times a day  chlorhexidine 4% Liquid 1 Application(s) Topical <User Schedule>  clopidogrel Tablet 75 milliGRAM(s) Oral daily  dextrose 5%. 1000 milliLiter(s) (50 mL/Hr) IV Continuous <Continuous>  dextrose 50% Injectable 12.5 Gram(s) IV Push once  dextrose 50% Injectable 25 Gram(s) IV Push once  dextrose 50% Injectable 25 Gram(s) IV Push once  furosemide    Tablet 20 milliGRAM(s) Oral at bedtime  furosemide    Tablet 40 milliGRAM(s) Oral daily  gabapentin 300 milliGRAM(s) Oral two times a day  guaiFENesin  milliGRAM(s) Oral every 12 hours  heparin  Injectable 7500 Unit(s) SubCutaneous every 8 hours  insulin glargine Injectable (LANTUS) 20 Unit(s) SubCutaneous at bedtime  insulin lispro (HumaLOG) corrective regimen sliding scale   SubCutaneous three times a day before meals  insulin lispro (HumaLOG) corrective regimen sliding scale   SubCutaneous at bedtime  insulin lispro Injectable (HumaLOG) 10 Unit(s) SubCutaneous before breakfast  insulin lispro Injectable (HumaLOG) 8 Unit(s) SubCutaneous before lunch  insulin lispro Injectable (HumaLOG) 8 Unit(s) SubCutaneous with dinner  levothyroxine 125 MICROGram(s) Oral daily  loratadine 10 milliGRAM(s) Oral daily  metoprolol succinate ER 50 milliGRAM(s) Oral daily  oxybutynin 5 milliGRAM(s) Oral two times a day  pantoprazole    Tablet 40 milliGRAM(s) Oral before breakfast  polyethylene glycol 3350 17 Gram(s) Oral daily  predniSONE   Tablet 20 milliGRAM(s) Oral daily  simvastatin 20 milliGRAM(s) Oral at bedtime  sodium chloride 3%  Inhalation 3 milliLiter(s) Inhalation every 6 hours  tiotropium 18 MICROgram(s) Capsule 1 Capsule(s) Inhalation daily  traMADol 100 milliGRAM(s) Oral three times a day    MEDICATIONS  (PRN):  bisacodyl Suppository 10 milliGRAM(s) Rectal daily PRN Constipation  dextrose 40% Gel 15 Gram(s) Oral once PRN Blood Glucose LESS THAN 70 milliGRAM(s)/deciliter  Diclofenac Topical Gel 1% 1 Application(s) 1 Application(s) Topical two times a day PRN pain  glucagon  Injectable 1 milliGRAM(s) IntraMuscular once PRN Glucose LESS THAN 70 milligrams/deciliter  sodium chloride 0.65% Nasal 1 Spray(s) Both Nostrils four times a day PRN Nasal Congestion      PHYSICAL EXAM:  VITALS: T(C): 37 (05-26-18 @ 14:20)  T(F): 98.6 (05-26-18 @ 14:20), Max: 98.7 (05-25-18 @ 22:05)  HR: 78 (05-26-18 @ 16:04) (78 - 93)  BP: 128/84 (05-26-18 @ 14:20) (123/90 - 130/91)  RR:  (18 - 18)  SpO2:  (95% - 96%)  Wt(kg): --  GENERAL: NAD, well-groomed, well-developed  EYES: No proptosis, no injection  HEENT:  Atraumatic, Normocephalic, moist mucous membranes  THYROID: Normal size, no palpable nodules  RESPIRATORY: Clear to auscultation bilaterally; No rales, rhonchi, wheezing, or rubs  CARDIOVASCULAR: Regular rate and rhythm; No murmurs; no peripheral edema  GI: Soft, nontender, non distended, normal bowel sounds  CUSHING'S SIGNS: no striae    POCT Blood Glucose.: 177 mg/dL (05-26-18 @ 18:34)  POCT Blood Glucose.: 286 mg/dL (05-26-18 @ 13:15)  POCT Blood Glucose.: 246 mg/dL (05-26-18 @ 09:15)  POCT Blood Glucose.: 140 mg/dL (05-25-18 @ 21:33)  POCT Blood Glucose.: 151 mg/dL (05-25-18 @ 17:57)  POCT Blood Glucose.: 312 mg/dL (05-25-18 @ 12:43)  POCT Blood Glucose.: 254 mg/dL (05-25-18 @ 08:46)  POCT Blood Glucose.: 197 mg/dL (05-24-18 @ 21:16)  POCT Blood Glucose.: 240 mg/dL (05-24-18 @ 18:17)  POCT Blood Glucose.: 367 mg/dL (05-24-18 @ 13:17)  POCT Blood Glucose.: 312 mg/dL (05-24-18 @ 09:15)  POCT Blood Glucose.: 365 mg/dL (05-23-18 @ 20:58)    05-26    139  |  95<L>  |  56<H>  ----------------------------<  182<H>  4.7   |  27  |  1.96<H>    EGFR if : 31  EGFR if non : 27    Ca    9.3      05-26            Thyroid Function Tests:  05-22 @ 20:08 TSH 33.82   05-22 @ 18:00 TSH 31.16       Hemoglobin A1C, Whole Blood: 10.3 % <H> [4.0 - 5.6] (05-24-18 @ 07:00) Chief Complaint/Follow-up on: T2DM/steroid-induced hyperglycemia    Subjective: No SOB - had been wheezing earlier. She has been concerned that she had no had a bm in 4-5 days. Yesterday she had a small bm and today she is having gas. Ate most of her dinner - complained that the chicken was cold.    MEDICATIONS  (STANDING):  acetaminophen   Tablet. 650 milliGRAM(s) Oral every 6 hours  ALBUTerol    90 MICROgram(s) HFA Inhaler 1 Puff(s) Inhalation every 4 hours  amLODIPine   Tablet 5 milliGRAM(s) Oral daily  buDESOnide 160 MICROgram(s)/formoterol 4.5 MICROgram(s) Inhaler 2 Puff(s) Inhalation two times a day  chlorhexidine 4% Liquid 1 Application(s) Topical <User Schedule>  clopidogrel Tablet 75 milliGRAM(s) Oral daily  dextrose 5%. 1000 milliLiter(s) (50 mL/Hr) IV Continuous <Continuous>  dextrose 50% Injectable 12.5 Gram(s) IV Push once  dextrose 50% Injectable 25 Gram(s) IV Push once  dextrose 50% Injectable 25 Gram(s) IV Push once  furosemide    Tablet 20 milliGRAM(s) Oral at bedtime  furosemide    Tablet 40 milliGRAM(s) Oral daily  gabapentin 300 milliGRAM(s) Oral two times a day  guaiFENesin  milliGRAM(s) Oral every 12 hours  heparin  Injectable 7500 Unit(s) SubCutaneous every 8 hours  insulin glargine Injectable (LANTUS) 20 Unit(s) SubCutaneous at bedtime  insulin lispro (HumaLOG) corrective regimen sliding scale   SubCutaneous three times a day before meals  insulin lispro (HumaLOG) corrective regimen sliding scale   SubCutaneous at bedtime  insulin lispro Injectable (HumaLOG) 10 Unit(s) SubCutaneous before breakfast  insulin lispro Injectable (HumaLOG) 8 Unit(s) SubCutaneous before lunch  insulin lispro Injectable (HumaLOG) 8 Unit(s) SubCutaneous with dinner  levothyroxine 125 MICROGram(s) Oral daily  loratadine 10 milliGRAM(s) Oral daily  metoprolol succinate ER 50 milliGRAM(s) Oral daily  oxybutynin 5 milliGRAM(s) Oral two times a day  pantoprazole    Tablet 40 milliGRAM(s) Oral before breakfast  polyethylene glycol 3350 17 Gram(s) Oral daily  predniSONE   Tablet 20 milliGRAM(s) Oral daily  simvastatin 20 milliGRAM(s) Oral at bedtime  sodium chloride 3%  Inhalation 3 milliLiter(s) Inhalation every 6 hours  tiotropium 18 MICROgram(s) Capsule 1 Capsule(s) Inhalation daily  traMADol 100 milliGRAM(s) Oral three times a day    MEDICATIONS  (PRN):  bisacodyl Suppository 10 milliGRAM(s) Rectal daily PRN Constipation  dextrose 40% Gel 15 Gram(s) Oral once PRN Blood Glucose LESS THAN 70 milliGRAM(s)/deciliter  Diclofenac Topical Gel 1% 1 Application(s) 1 Application(s) Topical two times a day PRN pain  glucagon  Injectable 1 milliGRAM(s) IntraMuscular once PRN Glucose LESS THAN 70 milligrams/deciliter  sodium chloride 0.65% Nasal 1 Spray(s) Both Nostrils four times a day PRN Nasal Congestion      PHYSICAL EXAM:  VITALS: T(C): 37 (05-26-18 @ 14:20)  T(F): 98.6 (05-26-18 @ 14:20), Max: 98.7 (05-25-18 @ 22:05)  HR: 78 (05-26-18 @ 16:04) (78 - 93)  BP: 128/84 (05-26-18 @ 14:20) (123/90 - 130/91)  RR:  (18 - 18)  SpO2:  (95% - 96%)  Wt(kg): --  GENERAL: NAD, well-groomed, well-developed  EYES: michael-orbital edema  RESPIRATORY: Clear to auscultation bilaterally; No rales, rhonchi, wheezing, or rubs  CARDIOVASCULAR: Regular rate and rhythm; No murmurs; no peripheral edema  GI: Soft, obese, nontender, non distended, normal bowel sounds      POCT Blood Glucose.: 177 mg/dL (05-26-18 @ 18:34)  POCT Blood Glucose.: 286 mg/dL (05-26-18 @ 13:15)  POCT Blood Glucose.: 246 mg/dL (05-26-18 @ 09:15)  POCT Blood Glucose.: 140 mg/dL (05-25-18 @ 21:33)  POCT Blood Glucose.: 151 mg/dL (05-25-18 @ 17:57)  POCT Blood Glucose.: 312 mg/dL (05-25-18 @ 12:43)  POCT Blood Glucose.: 254 mg/dL (05-25-18 @ 08:46)  POCT Blood Glucose.: 197 mg/dL (05-24-18 @ 21:16)  POCT Blood Glucose.: 240 mg/dL (05-24-18 @ 18:17)  POCT Blood Glucose.: 367 mg/dL (05-24-18 @ 13:17)  POCT Blood Glucose.: 312 mg/dL (05-24-18 @ 09:15)  POCT Blood Glucose.: 365 mg/dL (05-23-18 @ 20:58)    05-26    139  |  95<L>  |  56<H>  ----------------------------<  182<H>  4.7   |  27  |  1.96<H>    EGFR if : 31  EGFR if non : 27    Ca    9.3      05-26            Thyroid Function Tests:  05-22 @ 20:08 TSH 33.82   05-22 @ 18:00 TSH 31.16       Hemoglobin A1C, Whole Blood: 10.3 % <H> [4.0 - 5.6] (05-24-18 @ 07:00)

## 2018-05-26 NOTE — PROGRESS NOTE ADULT - PROBLEM SELECTOR PLAN 3
- on chronic steroids, 10mg at home  - cont pain medications as needed  - pt has outpt rheumatologist who gives orencia (has been off for about 2 months 2/2 being on antibiotics) - on chronic steroids, 10mg at home  - cont pain medications as needed  - pt has outpt rheumatologist who gives orencia (has been off for about 2 months 2/2 being on antibiotics)  - Naprosyn discontinued 2/2 SAM

## 2018-05-26 NOTE — PROVIDER CONTACT NOTE (OTHER) - ASSESSMENT
patient alert and oriented x 4 able to make needs known. patient with RA defomity of b/l hands. hand grasp equal on b/l hands denies numbness and tingling or weakness.

## 2018-05-26 NOTE — PROGRESS NOTE ADULT - MS EXT PE MLT D E PC
no cyanosis/no pedal edema/no clubbing
no cyanosis/no clubbing
no pedal edema/no clubbing/no cyanosis
no clubbing/no cyanosis/pedal edema

## 2018-05-27 LAB
BACTERIA BLD CULT: SIGNIFICANT CHANGE UP
BACTERIA BLD CULT: SIGNIFICANT CHANGE UP
BUN SERPL-MCNC: 58 MG/DL — HIGH (ref 7–23)
CALCIUM SERPL-MCNC: 9.1 MG/DL — SIGNIFICANT CHANGE UP (ref 8.4–10.5)
CHLORIDE SERPL-SCNC: 95 MMOL/L — LOW (ref 98–107)
CO2 SERPL-SCNC: 25 MMOL/L — SIGNIFICANT CHANGE UP (ref 22–31)
CREAT SERPL-MCNC: 1.85 MG/DL — HIGH (ref 0.5–1.3)
GLUCOSE BLDC GLUCOMTR-MCNC: 107 MG/DL — HIGH (ref 70–99)
GLUCOSE BLDC GLUCOMTR-MCNC: 123 MG/DL — HIGH (ref 70–99)
GLUCOSE BLDC GLUCOMTR-MCNC: 224 MG/DL — HIGH (ref 70–99)
GLUCOSE BLDC GLUCOMTR-MCNC: 338 MG/DL — HIGH (ref 70–99)
GLUCOSE SERPL-MCNC: 338 MG/DL — HIGH (ref 70–99)
MAGNESIUM SERPL-MCNC: 2.2 MG/DL — SIGNIFICANT CHANGE UP (ref 1.6–2.6)
PHOSPHATE SERPL-MCNC: 4.7 MG/DL — HIGH (ref 2.5–4.5)
POTASSIUM SERPL-MCNC: 4.9 MMOL/L — SIGNIFICANT CHANGE UP (ref 3.5–5.3)
POTASSIUM SERPL-SCNC: 4.9 MMOL/L — SIGNIFICANT CHANGE UP (ref 3.5–5.3)
SODIUM SERPL-SCNC: 137 MMOL/L — SIGNIFICANT CHANGE UP (ref 135–145)

## 2018-05-27 PROCEDURE — 99232 SBSQ HOSP IP/OBS MODERATE 35: CPT

## 2018-05-27 PROCEDURE — 99233 SBSQ HOSP IP/OBS HIGH 50: CPT | Mod: GC

## 2018-05-27 RX ORDER — INSULIN LISPRO 100/ML
8 VIAL (ML) SUBCUTANEOUS
Qty: 0 | Refills: 0 | Status: DISCONTINUED | OUTPATIENT
Start: 2018-05-27 | End: 2018-05-31

## 2018-05-27 RX ORDER — INSULIN GLARGINE 100 [IU]/ML
18 INJECTION, SOLUTION SUBCUTANEOUS AT BEDTIME
Qty: 0 | Refills: 0 | Status: DISCONTINUED | OUTPATIENT
Start: 2018-05-27 | End: 2018-05-31

## 2018-05-27 RX ORDER — FLUTICASONE PROPIONATE 50 MCG
1 SPRAY, SUSPENSION NASAL
Qty: 0 | Refills: 0 | Status: DISCONTINUED | OUTPATIENT
Start: 2018-05-27 | End: 2018-05-31

## 2018-05-27 RX ORDER — SULFASALAZINE 500 MG
500 TABLET ORAL
Qty: 0 | Refills: 0 | Status: DISCONTINUED | OUTPATIENT
Start: 2018-05-27 | End: 2018-05-31

## 2018-05-27 RX ADMIN — Medication 650 MILLIGRAM(S): at 13:52

## 2018-05-27 RX ADMIN — Medication 8 UNIT(S): at 13:30

## 2018-05-27 RX ADMIN — INSULIN GLARGINE 18 UNIT(S): 100 INJECTION, SOLUTION SUBCUTANEOUS at 21:50

## 2018-05-27 RX ADMIN — Medication 650 MILLIGRAM(S): at 12:52

## 2018-05-27 RX ADMIN — GABAPENTIN 300 MILLIGRAM(S): 400 CAPSULE ORAL at 05:50

## 2018-05-27 RX ADMIN — Medication 8 UNIT(S): at 18:25

## 2018-05-27 RX ADMIN — Medication 4: at 09:16

## 2018-05-27 RX ADMIN — TRAMADOL HYDROCHLORIDE 100 MILLIGRAM(S): 50 TABLET ORAL at 14:35

## 2018-05-27 RX ADMIN — Medication 650 MILLIGRAM(S): at 18:25

## 2018-05-27 RX ADMIN — Medication 20 MILLIGRAM(S): at 22:02

## 2018-05-27 RX ADMIN — TRAMADOL HYDROCHLORIDE 100 MILLIGRAM(S): 50 TABLET ORAL at 05:50

## 2018-05-27 RX ADMIN — CLOPIDOGREL BISULFATE 75 MILLIGRAM(S): 75 TABLET, FILM COATED ORAL at 12:52

## 2018-05-27 RX ADMIN — CHLORHEXIDINE GLUCONATE 1 APPLICATION(S): 213 SOLUTION TOPICAL at 09:16

## 2018-05-27 RX ADMIN — Medication 600 MILLIGRAM(S): at 18:25

## 2018-05-27 RX ADMIN — KETOTIFEN FUMARATE 1 DROP(S): 0.34 SOLUTION OPHTHALMIC at 05:51

## 2018-05-27 RX ADMIN — Medication 125 MICROGRAM(S): at 05:50

## 2018-05-27 RX ADMIN — GABAPENTIN 300 MILLIGRAM(S): 400 CAPSULE ORAL at 18:26

## 2018-05-27 RX ADMIN — Medication 650 MILLIGRAM(S): at 05:50

## 2018-05-27 RX ADMIN — PANTOPRAZOLE SODIUM 40 MILLIGRAM(S): 20 TABLET, DELAYED RELEASE ORAL at 06:02

## 2018-05-27 RX ADMIN — POLYETHYLENE GLYCOL 3350 17 GRAM(S): 17 POWDER, FOR SOLUTION ORAL at 12:52

## 2018-05-27 RX ADMIN — Medication 500 MILLIGRAM(S): at 18:25

## 2018-05-27 RX ADMIN — KETOTIFEN FUMARATE 1 DROP(S): 0.34 SOLUTION OPHTHALMIC at 18:26

## 2018-05-27 RX ADMIN — Medication 20 MILLIGRAM(S): at 05:50

## 2018-05-27 RX ADMIN — AMLODIPINE BESYLATE 5 MILLIGRAM(S): 2.5 TABLET ORAL at 05:50

## 2018-05-27 RX ADMIN — SIMVASTATIN 20 MILLIGRAM(S): 20 TABLET, FILM COATED ORAL at 22:02

## 2018-05-27 RX ADMIN — Medication 1 SPRAY(S): at 12:54

## 2018-05-27 RX ADMIN — TRAMADOL HYDROCHLORIDE 100 MILLIGRAM(S): 50 TABLET ORAL at 22:32

## 2018-05-27 RX ADMIN — Medication 600 MILLIGRAM(S): at 05:50

## 2018-05-27 RX ADMIN — Medication 10 UNIT(S): at 09:15

## 2018-05-27 RX ADMIN — Medication 650 MILLIGRAM(S): at 06:20

## 2018-05-27 RX ADMIN — Medication 5 MILLIGRAM(S): at 05:51

## 2018-05-27 RX ADMIN — SODIUM CHLORIDE 3 MILLILITER(S): 9 INJECTION INTRAMUSCULAR; INTRAVENOUS; SUBCUTANEOUS at 04:42

## 2018-05-27 RX ADMIN — Medication 50 MILLIGRAM(S): at 05:50

## 2018-05-27 RX ADMIN — Medication 40 MILLIGRAM(S): at 05:50

## 2018-05-27 RX ADMIN — TRAMADOL HYDROCHLORIDE 100 MILLIGRAM(S): 50 TABLET ORAL at 22:02

## 2018-05-27 RX ADMIN — LORATADINE 10 MILLIGRAM(S): 10 TABLET ORAL at 12:52

## 2018-05-27 RX ADMIN — TRAMADOL HYDROCHLORIDE 100 MILLIGRAM(S): 50 TABLET ORAL at 13:33

## 2018-05-27 RX ADMIN — Medication 1 SPRAY(S): at 10:05

## 2018-05-27 RX ADMIN — BUDESONIDE AND FORMOTEROL FUMARATE DIHYDRATE 2 PUFF(S): 160; 4.5 AEROSOL RESPIRATORY (INHALATION) at 09:57

## 2018-05-27 RX ADMIN — Medication 650 MILLIGRAM(S): at 00:03

## 2018-05-27 RX ADMIN — SODIUM CHLORIDE 3 MILLILITER(S): 9 INJECTION INTRAMUSCULAR; INTRAVENOUS; SUBCUTANEOUS at 16:00

## 2018-05-27 RX ADMIN — Medication 1 SPRAY(S): at 22:01

## 2018-05-27 RX ADMIN — TRAMADOL HYDROCHLORIDE 100 MILLIGRAM(S): 50 TABLET ORAL at 06:20

## 2018-05-27 RX ADMIN — BUDESONIDE AND FORMOTEROL FUMARATE DIHYDRATE 2 PUFF(S): 160; 4.5 AEROSOL RESPIRATORY (INHALATION) at 22:52

## 2018-05-27 RX ADMIN — Medication 10 MILLIGRAM(S): at 12:55

## 2018-05-27 RX ADMIN — Medication 5 MILLIGRAM(S): at 18:26

## 2018-05-27 RX ADMIN — Medication 8: at 13:30

## 2018-05-27 NOTE — PROGRESS NOTE ADULT - SUBJECTIVE AND OBJECTIVE BOX
Chief Complaint/Follow-up on: T2DM    Subjective: Pt ate eggs, morataya and potatoes for b'fast. She notes that dietary indiscretion has been an issue as her aide was making her a lot of rice, noodle and potato dishes. She also notes that her son brought her a hot sausage on a bun last night from the food truck.   No sob or wheezes currently.     MEDICATIONS  (STANDING):  acetaminophen   Tablet. 650 milliGRAM(s) Oral every 6 hours  ALBUTerol    90 MICROgram(s) HFA Inhaler 1 Puff(s) Inhalation every 4 hours  amLODIPine   Tablet 5 milliGRAM(s) Oral daily  buDESOnide 160 MICROgram(s)/formoterol 4.5 MICROgram(s) Inhaler 2 Puff(s) Inhalation two times a day  chlorhexidine 4% Liquid 1 Application(s) Topical <User Schedule>  clopidogrel Tablet 75 milliGRAM(s) Oral daily  dextrose 5%. 1000 milliLiter(s) (50 mL/Hr) IV Continuous <Continuous>  dextrose 50% Injectable 12.5 Gram(s) IV Push once  dextrose 50% Injectable 25 Gram(s) IV Push once  dextrose 50% Injectable 25 Gram(s) IV Push once  fluticasone propionate 50 MICROgram(s)/spray Nasal Spray 1 Spray(s) Both Nostrils two times a day  furosemide    Tablet 20 milliGRAM(s) Oral at bedtime  furosemide    Tablet 40 milliGRAM(s) Oral daily  gabapentin 300 milliGRAM(s) Oral two times a day  guaiFENesin  milliGRAM(s) Oral every 12 hours  heparin  Injectable 7500 Unit(s) SubCutaneous every 8 hours  insulin glargine Injectable (LANTUS) 18 Unit(s) SubCutaneous at bedtime  insulin lispro (HumaLOG) corrective regimen sliding scale   SubCutaneous three times a day before meals  insulin lispro (HumaLOG) corrective regimen sliding scale   SubCutaneous at bedtime  insulin lispro Injectable (HumaLOG) 8 Unit(s) SubCutaneous before lunch  insulin lispro Injectable (HumaLOG) 8 Unit(s) SubCutaneous with dinner  insulin lispro Injectable (HumaLOG) 8 Unit(s) SubCutaneous before breakfast  ketotifen 0.025% Ophthalmic Solution 1 Drop(s) Both EYES two times a day  levothyroxine 125 MICROGram(s) Oral daily  loratadine 10 milliGRAM(s) Oral daily  metoprolol succinate ER 50 milliGRAM(s) Oral daily  oxybutynin 5 milliGRAM(s) Oral two times a day  pantoprazole    Tablet 40 milliGRAM(s) Oral before breakfast  polyethylene glycol 3350 17 Gram(s) Oral daily  simvastatin 20 milliGRAM(s) Oral at bedtime  sodium chloride 3%  Inhalation 3 milliLiter(s) Inhalation every 6 hours  sulfaSALAzine 500 milliGRAM(s) Oral two times a day  tiotropium 18 MICROgram(s) Capsule 1 Capsule(s) Inhalation daily  traMADol 100 milliGRAM(s) Oral three times a day    MEDICATIONS  (PRN):  bisacodyl Suppository 10 milliGRAM(s) Rectal daily PRN Constipation  dextrose 40% Gel 15 Gram(s) Oral once PRN Blood Glucose LESS THAN 70 milliGRAM(s)/deciliter  Diclofenac Topical Gel 1% 1 Application(s) 1 Application(s) Topical two times a day PRN pain  glucagon  Injectable 1 milliGRAM(s) IntraMuscular once PRN Glucose LESS THAN 70 milligrams/deciliter  sodium chloride 0.65% Nasal 1 Spray(s) Both Nostrils four times a day PRN Nasal Congestion      PHYSICAL EXAM:  VITALS: T(C): 36.6 (05-27-18 @ 05:48)  T(F): 97.8 (05-27-18 @ 05:48), Max: 98.6 (05-26-18 @ 14:20)  HR: 80 (05-27-18 @ 09:57) (76 - 93)  BP: 118/76 (05-27-18 @ 05:48) (118/76 - 128/84)  RR:  (18 - 18)  SpO2:  (95% - 100%)  Wt(kg): --  GENERAL: NAD, well-groomed, well-developed  EYES: No proptosis, no injection  HEENT:  Atraumatic, Normocephalic, moist mucous membranes  THYROID: Normal size, no palpable nodules  RESPIRATORY: Clear to auscultation bilaterally; No rales, rhonchi, wheezing, or rubs  CARDIOVASCULAR: Regular rate and rhythm; No murmurs; no peripheral edema  GI: Soft, nontender, non distended, normal bowel sounds  CUSHING'S SIGNS: no striae    POCT Blood Glucose.: 338 mg/dL (05-27-18 @ 13:26)  POCT Blood Glucose.: 224 mg/dL (05-27-18 @ 08:53)  POCT Blood Glucose.: 243 mg/dL (05-26-18 @ 21:06)  POCT Blood Glucose.: 177 mg/dL (05-26-18 @ 18:34)  POCT Blood Glucose.: 286 mg/dL (05-26-18 @ 13:15)  POCT Blood Glucose.: 246 mg/dL (05-26-18 @ 09:15)  POCT Blood Glucose.: 140 mg/dL (05-25-18 @ 21:33)  POCT Blood Glucose.: 151 mg/dL (05-25-18 @ 17:57)  POCT Blood Glucose.: 312 mg/dL (05-25-18 @ 12:43)  POCT Blood Glucose.: 254 mg/dL (05-25-18 @ 08:46)  POCT Blood Glucose.: 197 mg/dL (05-24-18 @ 21:16)  POCT Blood Glucose.: 240 mg/dL (05-24-18 @ 18:17)    05-27    137  |  95<L>  |  58<H>  ----------------------------<  338<H>  4.9   |  25  |  1.85<H>    EGFR if : 34  EGFR if non : 29    Ca    9.1      05-27  Mg     2.2     05-27  Phos  4.7     05-27            Thyroid Function Tests:  05-22 @ 20:08 TSH 33.82   05-22 @ 18:00 TSH 31.16       Hemoglobin A1C, Whole Blood: 10.3 % <H> [4.0 - 5.6] (05-24-18 @ 07:00)

## 2018-05-27 NOTE — PROGRESS NOTE ADULT - SUBJECTIVE AND OBJECTIVE BOX
CHIEF COMPLAINT:    Interval Events:    REVIEW OF SYSTEMS:  Constitutional:   Eyes:  ENT:  CV:  Resp:  GI:  :  MSK:  Integumentary:  Neurological:  Psychiatric:  Endocrine:  Hematologic/Lymphatic:  Allergic/Immunologic:  [ ] All other systems negative  [ ] Unable to assess ROS because ________    OBJECTIVE:  ICU Vital Signs Last 24 Hrs  T(C): 36.6 (27 May 2018 05:48), Max: 37 (26 May 2018 14:20)  T(F): 97.8 (27 May 2018 05:48), Max: 98.6 (26 May 2018 14:20)  HR: 78 (27 May 2018 05:48) (76 - 93)  BP: 118/76 (27 May 2018 05:48) (118/76 - 128/84)  BP(mean): --  ABP: --  ABP(mean): --  RR: 18 (27 May 2018 05:48) (18 - 18)  SpO2: 100% (27 May 2018 05:48) (95% - 100%)        CAPILLARY BLOOD GLUCOSE      POCT Blood Glucose.: 243 mg/dL (26 May 2018 21:06)      PHYSICAL EXAM:  General:   HEENT:   Lymph Nodes:  Neck:   Respiratory:   Cardiovascular:   Abdomen:   Extremities:   Skin:   Neurological:  Psychiatry:    HOSPITAL MEDICATIONS:  MEDICATIONS  (STANDING):  acetaminophen   Tablet. 650 milliGRAM(s) Oral every 6 hours  ALBUTerol    90 MICROgram(s) HFA Inhaler 1 Puff(s) Inhalation every 4 hours  amLODIPine   Tablet 5 milliGRAM(s) Oral daily  buDESOnide 160 MICROgram(s)/formoterol 4.5 MICROgram(s) Inhaler 2 Puff(s) Inhalation two times a day  chlorhexidine 4% Liquid 1 Application(s) Topical <User Schedule>  clopidogrel Tablet 75 milliGRAM(s) Oral daily  dextrose 5%. 1000 milliLiter(s) (50 mL/Hr) IV Continuous <Continuous>  dextrose 50% Injectable 12.5 Gram(s) IV Push once  dextrose 50% Injectable 25 Gram(s) IV Push once  dextrose 50% Injectable 25 Gram(s) IV Push once  furosemide    Tablet 20 milliGRAM(s) Oral at bedtime  furosemide    Tablet 40 milliGRAM(s) Oral daily  gabapentin 300 milliGRAM(s) Oral two times a day  guaiFENesin  milliGRAM(s) Oral every 12 hours  heparin  Injectable 7500 Unit(s) SubCutaneous every 8 hours  insulin glargine Injectable (LANTUS) 20 Unit(s) SubCutaneous at bedtime  insulin lispro (HumaLOG) corrective regimen sliding scale   SubCutaneous three times a day before meals  insulin lispro (HumaLOG) corrective regimen sliding scale   SubCutaneous at bedtime  insulin lispro Injectable (HumaLOG) 10 Unit(s) SubCutaneous before breakfast  insulin lispro Injectable (HumaLOG) 8 Unit(s) SubCutaneous before lunch  insulin lispro Injectable (HumaLOG) 8 Unit(s) SubCutaneous with dinner  ketotifen 0.025% Ophthalmic Solution 1 Drop(s) Both EYES two times a day  levothyroxine 125 MICROGram(s) Oral daily  loratadine 10 milliGRAM(s) Oral daily  metoprolol succinate ER 50 milliGRAM(s) Oral daily  oxybutynin 5 milliGRAM(s) Oral two times a day  pantoprazole    Tablet 40 milliGRAM(s) Oral before breakfast  polyethylene glycol 3350 17 Gram(s) Oral daily  predniSONE   Tablet 20 milliGRAM(s) Oral daily  simvastatin 20 milliGRAM(s) Oral at bedtime  sodium chloride 3%  Inhalation 3 milliLiter(s) Inhalation every 6 hours  tiotropium 18 MICROgram(s) Capsule 1 Capsule(s) Inhalation daily  traMADol 100 milliGRAM(s) Oral three times a day    MEDICATIONS  (PRN):  bisacodyl Suppository 10 milliGRAM(s) Rectal daily PRN Constipation  dextrose 40% Gel 15 Gram(s) Oral once PRN Blood Glucose LESS THAN 70 milliGRAM(s)/deciliter  Diclofenac Topical Gel 1% 1 Application(s) 1 Application(s) Topical two times a day PRN pain  glucagon  Injectable 1 milliGRAM(s) IntraMuscular once PRN Glucose LESS THAN 70 milligrams/deciliter  sodium chloride 0.65% Nasal 1 Spray(s) Both Nostrils four times a day PRN Nasal Congestion      LABS:                        11.6   10.60 )-----------( 263      ( 26 May 2018 07:00 )             37.7     05-26    139  |  95<L>  |  56<H>  ----------------------------<  182<H>  4.7   |  27  |  1.96<H>    Ca    9.3      26 May 2018 07:00            Venous Blood Gas:  05-26 @ 07:00  7.31/61/59/26/88.2  VBG Lactate: --      MICROBIOLOGY:     RADIOLOGY:  [ ] Reviewed and interpreted by me    PULMONARY FUNCTION TESTS:    EKG:    I&O's Summary CHIEF COMPLAINT: Patient is a 62y old  Female who presents with a chief complaint of dyspnea, fever     Interval Events: Pt reports re breathing is "getting better." C/o nasal congestion.    REVIEW OF SYSTEMS:  Constitutional: No acute distress  ENT: C/o nasal congestion  CV: Denies  Resp: Cough improving  GI: C/o constipation (reports she will take prn suppository later today if no DM)  [X ] All other systems negative    OBJECTIVE:  ICU Vital Signs Last 24 Hrs  T(C): 36.6 (27 May 2018 05:48), Max: 37 (26 May 2018 14:20)  T(F): 97.8 (27 May 2018 05:48), Max: 98.6 (26 May 2018 14:20)  HR: 78 (27 May 2018 05:48) (76 - 93)  BP: 118/76 (27 May 2018 05:48) (118/76 - 128/84)  BP(mean): --  ABP: --  ABP(mean): --  RR: 18 (27 May 2018 05:48) (18 - 18)  SpO2: 100% (27 May 2018 05:48) (95% - 100%)        CAPILLARY BLOOD GLUCOSE      POCT Blood Glucose.: 243 mg/dL (26 May 2018 21:06)      Physical Exam:   · Constitutional	detailed exam  · Constitutional Details	well-developed; well-groomed; no distress  · Eyes	detailed exam  · Eyes Details	EOMI; conjunctiva clear  · ENMT	No oral lesions; no gross abnormalities  · Neck	detailed exam  · Neck Details	normal; supple; no JVD  · Breasts	not examined  · Back	not examined  · Respiratory	detailed exam  · Respiratory Details	airway patent; breath sounds equal; good air movement; respirations non-labored; Expiratory wheeze; occasional cough  · Cardiovascular	detailed exam  · Cardiovascular Details	regular rate and rhythm  · Cardiovascular Details	positive S1; positive S2  · Gastrointestinal	detailed exam  · GI Normal	soft; nontender; no distention; BS+  · Genitourinary	not examined  · Rectal	not examined  · Extremities	detailed exam  · Extremities Details	no clubbing; no cyanosis; LE edema+  · Vascular	detailed exam  · Neurological	detailed exam  · Neurological Details	alert and oriented x 3; responds to pain; responds to verbal commands  · Skin	detailed exam  · Skin Details	warm and dry  · Lymph Nodes	not examined  · Musculoskeletal	detailed exam  · Musculoskeletal Details	ROM intact; no joint swelling  · Psychiatric	detailed exam  · Psychiatric Details	normal affect; normal behavior    HOSPITAL MEDICATIONS:  MEDICATIONS  (STANDING):  acetaminophen   Tablet. 650 milliGRAM(s) Oral every 6 hours  ALBUTerol    90 MICROgram(s) HFA Inhaler 1 Puff(s) Inhalation every 4 hours  amLODIPine   Tablet 5 milliGRAM(s) Oral daily  buDESOnide 160 MICROgram(s)/formoterol 4.5 MICROgram(s) Inhaler 2 Puff(s) Inhalation two times a day  chlorhexidine 4% Liquid 1 Application(s) Topical <User Schedule>  clopidogrel Tablet 75 milliGRAM(s) Oral daily  dextrose 5%. 1000 milliLiter(s) (50 mL/Hr) IV Continuous <Continuous>  dextrose 50% Injectable 12.5 Gram(s) IV Push once  dextrose 50% Injectable 25 Gram(s) IV Push once  dextrose 50% Injectable 25 Gram(s) IV Push once  furosemide    Tablet 20 milliGRAM(s) Oral at bedtime  furosemide    Tablet 40 milliGRAM(s) Oral daily  gabapentin 300 milliGRAM(s) Oral two times a day  guaiFENesin  milliGRAM(s) Oral every 12 hours  heparin  Injectable 7500 Unit(s) SubCutaneous every 8 hours  insulin glargine Injectable (LANTUS) 20 Unit(s) SubCutaneous at bedtime  insulin lispro (HumaLOG) corrective regimen sliding scale   SubCutaneous three times a day before meals  insulin lispro (HumaLOG) corrective regimen sliding scale   SubCutaneous at bedtime  insulin lispro Injectable (HumaLOG) 10 Unit(s) SubCutaneous before breakfast  insulin lispro Injectable (HumaLOG) 8 Unit(s) SubCutaneous before lunch  insulin lispro Injectable (HumaLOG) 8 Unit(s) SubCutaneous with dinner  ketotifen 0.025% Ophthalmic Solution 1 Drop(s) Both EYES two times a day  levothyroxine 125 MICROGram(s) Oral daily  loratadine 10 milliGRAM(s) Oral daily  metoprolol succinate ER 50 milliGRAM(s) Oral daily  oxybutynin 5 milliGRAM(s) Oral two times a day  pantoprazole    Tablet 40 milliGRAM(s) Oral before breakfast  polyethylene glycol 3350 17 Gram(s) Oral daily  predniSONE   Tablet 20 milliGRAM(s) Oral daily  simvastatin 20 milliGRAM(s) Oral at bedtime  sodium chloride 3%  Inhalation 3 milliLiter(s) Inhalation every 6 hours  tiotropium 18 MICROgram(s) Capsule 1 Capsule(s) Inhalation daily  traMADol 100 milliGRAM(s) Oral three times a day    MEDICATIONS  (PRN):  bisacodyl Suppository 10 milliGRAM(s) Rectal daily PRN Constipation  dextrose 40% Gel 15 Gram(s) Oral once PRN Blood Glucose LESS THAN 70 milliGRAM(s)/deciliter  Diclofenac Topical Gel 1% 1 Application(s) 1 Application(s) Topical two times a day PRN pain  glucagon  Injectable 1 milliGRAM(s) IntraMuscular once PRN Glucose LESS THAN 70 milligrams/deciliter  sodium chloride 0.65% Nasal 1 Spray(s) Both Nostrils four times a day PRN Nasal Congestion      LABS:                        11.6   10.60 )-----------( 263      ( 26 May 2018 07:00 )             37.7     05-26    139  |  95<L>  |  56<H>  ----------------------------<  182<H>  4.7   |  27  |  1.96<H>    Ca    9.3      26 May 2018 07:00            Venous Blood Gas:  05-26 @ 07:00  7.31/61/59/26/88.2  VBG Lactate: --      MICROBIOLOGY:     RADIOLOGY:  [ ] Reviewed and interpreted by me    PULMONARY FUNCTION TESTS:    EKG:    I&O's Summary

## 2018-05-27 NOTE — PROGRESS NOTE ADULT - ASSESSMENT
· Assessment	  63 y/o F with COPD, pulmonary HTN, RA on prednisone, TIA, HTN p/w dyspnea fever and mucus production.      Problem/Plan - 1:  ·  Problem: Chronic obstructive pulmonary disease with acute lower respiratory infection.  Plan:   - with exacerbation  - transitioned to saline nebs yesterday  - finished course of abx yesterday  - decrease steroids to 10mg  - supplemental oxygen as needed  - ABG in am     Problem/Plan - 2:  ·  Problem: Pulmonary hypertension.  Plan:   - cont lasix at home dose.      Problem/Plan - 3:  ·  Problem: Rheumatoid arthritis, involving unspecified site, unspecified rheumatoid factor presence.  Plan:   - on chronic steroids, 10mg at home  - cont pain medications as needed  - pt has outpt rheumatologist who gives orencia (has been off for about 2 months 2/2 being on antibiotics)  - Naprosyn discontinued 2/2 SAM. Tylenol instead   - Home dose of sulfasalazine added to regimen     Problem/Plan - 4:  ·  Problem: Essential (primary) hypertension.  Plan:   - cont meds  - monitor B/P.     Problem/Plan - 5:  ·  Problem: Type 2 diabetes mellitus.  Plan:   - with a1c of 10.3  - pt is only on oral medications at home  - will start lantus and humalog premeals with moderate ISS  - endo consulted for medication regimen establishment as pt states she will have difficulty drawing insulin and injecting herself 2/2 RA -[place] is moving [place]   - endo continuing to adjust DM meds as needed  - DM nurse educated patient   - will need all insulin supplies and new glucometer for discharge  - monitor FSs.      Problem/Plan - 6:  Problem: Adult Hypothyroidism. Plan:   - TSH 33.82  - synthroid to 125mcg  - repeat TFTs in 4-6 weeks.     Problem/Plan - 7:  ·  Problem: Need for prophylactic measure.  Plan:   - heparin SQ.      Problem/Plan - 8:  ·  Problem: SAM (acute kidney injury).  Plan:   -Held naprosyn  -Re-dosed gabapentin for cr cl  -Monitor bun/cr, awaiting today's lab     Problem/Plan - 9:  ·  Problem: Nasal congestion  -Fluticasone

## 2018-05-27 NOTE — PROGRESS NOTE ADULT - ASSESSMENT
62 F with newly diagnosed T2DM in the setting of high dose steroids for acute lower respiratory infection.

## 2018-05-28 ENCOUNTER — TRANSCRIPTION ENCOUNTER (OUTPATIENT)
Age: 63
End: 2018-05-28

## 2018-05-28 LAB
BASE EXCESS BLDA CALC-SCNC: 4.7 MMOL/L — SIGNIFICANT CHANGE UP
BUN SERPL-MCNC: 61 MG/DL — HIGH (ref 7–23)
CALCIUM SERPL-MCNC: 9.5 MG/DL — SIGNIFICANT CHANGE UP (ref 8.4–10.5)
CHLORIDE SERPL-SCNC: 96 MMOL/L — LOW (ref 98–107)
CO2 SERPL-SCNC: 27 MMOL/L — SIGNIFICANT CHANGE UP (ref 22–31)
CREAT SERPL-MCNC: 1.66 MG/DL — HIGH (ref 0.5–1.3)
GLUCOSE BLDC GLUCOMTR-MCNC: 114 MG/DL — HIGH (ref 70–99)
GLUCOSE BLDC GLUCOMTR-MCNC: 174 MG/DL — HIGH (ref 70–99)
GLUCOSE BLDC GLUCOMTR-MCNC: 197 MG/DL — HIGH (ref 70–99)
GLUCOSE BLDC GLUCOMTR-MCNC: 299 MG/DL — HIGH (ref 70–99)
GLUCOSE SERPL-MCNC: 166 MG/DL — HIGH (ref 70–99)
HCO3 BLDA-SCNC: 28 MMOL/L — HIGH (ref 22–26)
HCT VFR BLD CALC: 34.7 % — SIGNIFICANT CHANGE UP (ref 34.5–45)
HGB BLD-MCNC: 10.9 G/DL — LOW (ref 11.5–15.5)
MCHC RBC-ENTMCNC: 31.1 PG — SIGNIFICANT CHANGE UP (ref 27–34)
MCHC RBC-ENTMCNC: 31.4 % — LOW (ref 32–36)
MCV RBC AUTO: 99.1 FL — SIGNIFICANT CHANGE UP (ref 80–100)
NRBC # FLD: 0.02 — SIGNIFICANT CHANGE UP
PCO2 BLDA: 52 MMHG — HIGH (ref 32–48)
PH BLDA: 7.37 PH — SIGNIFICANT CHANGE UP (ref 7.35–7.45)
PLATELET # BLD AUTO: 272 K/UL — SIGNIFICANT CHANGE UP (ref 150–400)
PMV BLD: 10.6 FL — SIGNIFICANT CHANGE UP (ref 7–13)
PO2 BLDA: 96 MMHG — SIGNIFICANT CHANGE UP (ref 83–108)
POTASSIUM SERPL-MCNC: 4.2 MMOL/L — SIGNIFICANT CHANGE UP (ref 3.5–5.3)
POTASSIUM SERPL-SCNC: 4.2 MMOL/L — SIGNIFICANT CHANGE UP (ref 3.5–5.3)
RBC # BLD: 3.5 M/UL — LOW (ref 3.8–5.2)
RBC # FLD: 14.6 % — HIGH (ref 10.3–14.5)
SAO2 % BLDA: 97.8 % — SIGNIFICANT CHANGE UP (ref 95–99)
SODIUM SERPL-SCNC: 139 MMOL/L — SIGNIFICANT CHANGE UP (ref 135–145)
WBC # BLD: 11.35 K/UL — HIGH (ref 3.8–10.5)
WBC # FLD AUTO: 11.35 K/UL — HIGH (ref 3.8–10.5)

## 2018-05-28 PROCEDURE — 99233 SBSQ HOSP IP/OBS HIGH 50: CPT | Mod: GC

## 2018-05-28 RX ORDER — SITAGLIPTIN 50 MG/1
1 TABLET, FILM COATED ORAL
Qty: 30 | Refills: 0 | OUTPATIENT
Start: 2018-05-28 | End: 2018-06-26

## 2018-05-28 RX ORDER — DOCUSATE SODIUM 100 MG
100 CAPSULE ORAL THREE TIMES A DAY
Qty: 0 | Refills: 0 | Status: DISCONTINUED | OUTPATIENT
Start: 2018-05-28 | End: 2018-05-31

## 2018-05-28 RX ORDER — SENNA PLUS 8.6 MG/1
2 TABLET ORAL AT BEDTIME
Qty: 0 | Refills: 0 | Status: DISCONTINUED | OUTPATIENT
Start: 2018-05-28 | End: 2018-05-31

## 2018-05-28 RX ORDER — INSULIN DEGLUDEC 100 U/ML
18 INJECTION, SOLUTION SUBCUTANEOUS
Qty: 5 | Refills: 0 | OUTPATIENT
Start: 2018-05-28 | End: 2018-06-26

## 2018-05-28 RX ORDER — SODIUM CHLORIDE 0.65 %
1 AEROSOL, SPRAY (ML) NASAL
Qty: 0 | Refills: 0 | Status: DISCONTINUED | OUTPATIENT
Start: 2018-05-28 | End: 2018-05-31

## 2018-05-28 RX ADMIN — BUDESONIDE AND FORMOTEROL FUMARATE DIHYDRATE 2 PUFF(S): 160; 4.5 AEROSOL RESPIRATORY (INHALATION) at 21:14

## 2018-05-28 RX ADMIN — Medication 650 MILLIGRAM(S): at 00:32

## 2018-05-28 RX ADMIN — POLYETHYLENE GLYCOL 3350 17 GRAM(S): 17 POWDER, FOR SOLUTION ORAL at 11:17

## 2018-05-28 RX ADMIN — Medication 8 UNIT(S): at 17:55

## 2018-05-28 RX ADMIN — INSULIN GLARGINE 18 UNIT(S): 100 INJECTION, SOLUTION SUBCUTANEOUS at 22:00

## 2018-05-28 RX ADMIN — Medication 2: at 17:55

## 2018-05-28 RX ADMIN — Medication 650 MILLIGRAM(S): at 06:13

## 2018-05-28 RX ADMIN — TRAMADOL HYDROCHLORIDE 100 MILLIGRAM(S): 50 TABLET ORAL at 06:13

## 2018-05-28 RX ADMIN — TRAMADOL HYDROCHLORIDE 100 MILLIGRAM(S): 50 TABLET ORAL at 13:26

## 2018-05-28 RX ADMIN — Medication 125 MICROGRAM(S): at 05:44

## 2018-05-28 RX ADMIN — Medication 8 UNIT(S): at 08:55

## 2018-05-28 RX ADMIN — PANTOPRAZOLE SODIUM 40 MILLIGRAM(S): 20 TABLET, DELAYED RELEASE ORAL at 05:44

## 2018-05-28 RX ADMIN — AMLODIPINE BESYLATE 5 MILLIGRAM(S): 2.5 TABLET ORAL at 05:43

## 2018-05-28 RX ADMIN — Medication 650 MILLIGRAM(S): at 17:20

## 2018-05-28 RX ADMIN — Medication 5 MILLIGRAM(S): at 17:19

## 2018-05-28 RX ADMIN — SIMVASTATIN 20 MILLIGRAM(S): 20 TABLET, FILM COATED ORAL at 22:01

## 2018-05-28 RX ADMIN — GABAPENTIN 300 MILLIGRAM(S): 400 CAPSULE ORAL at 17:19

## 2018-05-28 RX ADMIN — Medication 650 MILLIGRAM(S): at 17:22

## 2018-05-28 RX ADMIN — TRAMADOL HYDROCHLORIDE 100 MILLIGRAM(S): 50 TABLET ORAL at 13:25

## 2018-05-28 RX ADMIN — Medication 2: at 08:54

## 2018-05-28 RX ADMIN — GABAPENTIN 300 MILLIGRAM(S): 400 CAPSULE ORAL at 05:43

## 2018-05-28 RX ADMIN — Medication 500 MILLIGRAM(S): at 05:43

## 2018-05-28 RX ADMIN — Medication 40 MILLIGRAM(S): at 05:43

## 2018-05-28 RX ADMIN — Medication 500 MILLIGRAM(S): at 17:19

## 2018-05-28 RX ADMIN — BUDESONIDE AND FORMOTEROL FUMARATE DIHYDRATE 2 PUFF(S): 160; 4.5 AEROSOL RESPIRATORY (INHALATION) at 10:25

## 2018-05-28 RX ADMIN — LORATADINE 10 MILLIGRAM(S): 10 TABLET ORAL at 11:16

## 2018-05-28 RX ADMIN — Medication 600 MILLIGRAM(S): at 17:19

## 2018-05-28 RX ADMIN — SODIUM CHLORIDE 3 MILLILITER(S): 9 INJECTION INTRAMUSCULAR; INTRAVENOUS; SUBCUTANEOUS at 10:25

## 2018-05-28 RX ADMIN — Medication 1 SPRAY(S): at 11:16

## 2018-05-28 RX ADMIN — KETOTIFEN FUMARATE 1 DROP(S): 0.34 SOLUTION OPHTHALMIC at 17:19

## 2018-05-28 RX ADMIN — Medication 1 SPRAY(S): at 17:19

## 2018-05-28 RX ADMIN — Medication 600 MILLIGRAM(S): at 05:43

## 2018-05-28 RX ADMIN — Medication 6: at 13:15

## 2018-05-28 RX ADMIN — Medication 5 MILLIGRAM(S): at 05:44

## 2018-05-28 RX ADMIN — Medication 650 MILLIGRAM(S): at 11:16

## 2018-05-28 RX ADMIN — TRAMADOL HYDROCHLORIDE 100 MILLIGRAM(S): 50 TABLET ORAL at 22:01

## 2018-05-28 RX ADMIN — KETOTIFEN FUMARATE 1 DROP(S): 0.34 SOLUTION OPHTHALMIC at 05:41

## 2018-05-28 RX ADMIN — Medication 8 UNIT(S): at 13:15

## 2018-05-28 RX ADMIN — Medication 1 SPRAY(S): at 05:42

## 2018-05-28 RX ADMIN — Medication 50 MILLIGRAM(S): at 05:43

## 2018-05-28 RX ADMIN — Medication 650 MILLIGRAM(S): at 00:02

## 2018-05-28 RX ADMIN — Medication 1 SPRAY(S): at 23:35

## 2018-05-28 RX ADMIN — CLOPIDOGREL BISULFATE 75 MILLIGRAM(S): 75 TABLET, FILM COATED ORAL at 11:16

## 2018-05-28 RX ADMIN — Medication 650 MILLIGRAM(S): at 05:43

## 2018-05-28 RX ADMIN — Medication 100 MILLIGRAM(S): at 13:20

## 2018-05-28 RX ADMIN — TRAMADOL HYDROCHLORIDE 100 MILLIGRAM(S): 50 TABLET ORAL at 05:43

## 2018-05-28 RX ADMIN — Medication 20 MILLIGRAM(S): at 22:01

## 2018-05-28 RX ADMIN — TRAMADOL HYDROCHLORIDE 100 MILLIGRAM(S): 50 TABLET ORAL at 22:31

## 2018-05-28 RX ADMIN — Medication 650 MILLIGRAM(S): at 11:19

## 2018-05-28 RX ADMIN — Medication 10 MILLIGRAM(S): at 05:38

## 2018-05-28 RX ADMIN — Medication 650 MILLIGRAM(S): at 23:34

## 2018-05-28 RX ADMIN — CHLORHEXIDINE GLUCONATE 1 APPLICATION(S): 213 SOLUTION TOPICAL at 10:13

## 2018-05-28 NOTE — DISCHARGE NOTE ADULT - CARE PROVIDER_API CALL
Lisker, Gita N (MD), Medicine  Pulmonary Medicine  [address] [facility] [address]  [place] [address]  Phone: [phone]  Fax: [phone]    Jia Jules), EndocrinologyMetabDiabetes; Internal Medicine  [address] [facility] [address]  [place] [address]  Phone: [phone]  Fax: [phone] Lisker, Gita N (MD), Medicine  Pulmonary Medicine  [address] [facility]  [address]  [place] [address]  Phone: [phone]  Fax: [phone]    Jia Jules), EndocrinologyMetabDiabetes; Internal Medicine  [address] [facility] [address]  [place] [address]  Phone: [phone]  Fax: [phone]    Kristie Nails (), Family Medicine  [address] [place] [address]  Phone: [phone]  Fax: [phone] Lisker, Gita N (MD), Medicine  Pulmonary Medicine  [address] [facility] [address]  [place] [address]  Phone: [phone]  Fax: [phone]    Kristie Nails (), Family Medicine  [address] [place] [address]  Phone: [phone]  Fax: [phone]    Sid Saul), Internal Medicine  [address] [place] [address]  Phone: [phone]  Fax: [phone]

## 2018-05-28 NOTE — DISCHARGE NOTE ADULT - CARE PROVIDERS DIRECT ADDRESSES
,[email or web address]

## 2018-05-28 NOTE — DISCHARGE NOTE ADULT - CARE PLAN
Principal Discharge DX:	Chronic obstructive pulmonary disease with acute lower respiratory infection  Assessment and plan of treatment:	Follow up with the pulonary team within 1 weeks of dsicharge.  Continue prednisone 10mg oral daily as you were taking before admission.  Secondary Diagnosis:	Type 2 diabetes mellitus  Assessment and plan of treatment:	Continue consistent carbohydrate diet.  Monitor blood glucose levels throughout the day before meals and at bedtime.  Record blood sugars and bring to outpatient providers appointment in order to be reviewed by your doctor for management modifications.  Be aware of diabetes management symptoms including feeling cool and clammy may be related to low glucose levels.  Feeling hot and dry may indicate high glucose levels.  If  you feel these symptoms, check your blood sugar.  Make regular podiatry appointments in order to have feet checked for wounds and toe nails cut by a doctor to prevent infections.  Follow up with the endocrine team within 1 week of discharge.  You should be monitoring your sugar before each meal and at bedtime - document these numbers and bring the record to your endocrine appointment.  Please call the endocrine depart to make a follow up appointment.  Please call [phone] to make an appointment  Secondary Diagnosis:	RA (rheumatoid arthritis)  Assessment and plan of treatment:	Continue medication regimen as you were previously to admission.  Follow up with our rheumatologist outpatient.  Secondary Diagnosis:	Essential (primary) hypertension  Assessment and plan of treatment:	Continue blood pressure medication regimen as directed. Monitor for any visual changes, headaches or dizziness.  Monitor blood pressure regularly.  Follow up with your PCP for further management for high blood pressure.  Secondary Diagnosis:	Chronic diastolic congestive heart failure  Assessment and plan of treatment:	Continue lasix  Secondary Diagnosis:	High cholesterol  Assessment and plan of treatment:	Continue cholesterol control medications. Continue DASH diet. Follow up with your PCP within 1 week of discharge for further management and monitoring of lipid and cholesterol panels. Principal Discharge DX:	Chronic obstructive pulmonary disease with acute lower respiratory infection  Assessment and plan of treatment:	Follow up with the pulmonary team within 1 weeks of discharge.  Continue prednisone 10mg oral daily as you were taking before admission.  Secondary Diagnosis:	Type 2 diabetes mellitus  Assessment and plan of treatment:	Continue consistent carbohydrate diet.  Monitor blood glucose levels throughout the day before meals and at bedtime.  Record blood sugars and bring to outpatient providers appointment in order to be reviewed by your doctor for management modifications.  Be aware of diabetes management symptoms including feeling cool and clammy may be related to low glucose levels.  Feeling hot and dry may indicate high glucose levels.  If  you feel these symptoms, check your blood sugar.  Make regular podiatry appointments in order to have feet checked for wounds and toe nails cut by a doctor to prevent infections.  Follow up with the endocrine team within 1 week of discharge.  You should be monitoring your sugar before each meal and at bedtime - document these numbers and bring the record to your endocrine appointment.  Please call the endocrine depart to make a follow up appointment.  Please call [phone] to make an appointment  Secondary Diagnosis:	RA (rheumatoid arthritis)  Assessment and plan of treatment:	Continue medication regimen as you were previously to admission.  Follow up with our rheumatologist outpatient.  Secondary Diagnosis:	Essential (primary) hypertension  Assessment and plan of treatment:	Continue blood pressure medication regimen as directed. Monitor for any visual changes, headaches or dizziness.  Monitor blood pressure regularly.  Follow up with your PCP for further management for high blood pressure.  Secondary Diagnosis:	Chronic diastolic congestive heart failure  Assessment and plan of treatment:	Continue lasix  Follow up with your cardiologist for further management and for echo evaluation.  Secondary Diagnosis:	High cholesterol  Assessment and plan of treatment:	Continue cholesterol control medications. Continue DASH diet. Follow up with your PCP within 1 week of discharge for further management and monitoring of lipid and cholesterol panels.  Secondary Diagnosis:	Adult Hypothyroidism  Assessment and plan of treatment:	Continue synthroid regimen as ordered.  Monitor for any symptoms of decreased or elevated thyroid function.  You dose of synthroid was increased, please follow up with your PCP and or endocrinologist in 6 weeks for thyroid function evaluation and medication management Principal Discharge DX:	Chronic obstructive pulmonary disease with acute lower respiratory infection  Goal:	improved  Assessment and plan of treatment:	Follow up with the pulmonary team within 1 weeks of discharge.  Continue prednisone 10mg oral daily as you were taking before admission.  FOLLOW UP: 6/11  @ 1:30 pm with Dr. Gita Lisker - [address] [place] [address]   *** YOU NEED TO OBTAIN A REFERRAL FROM YOUR PCP FOR THIS APPOINTMENT****  Secondary Diagnosis:	Type 2 diabetes mellitus  Assessment and plan of treatment:	Continue consistent carbohydrate diet.  Monitor blood glucose levels throughout the day before meals and at bedtime.  Record blood sugars and bring to outpatient providers appointment in order to be reviewed by your doctor for management modifications.  Be aware of diabetes management symptoms including feeling cool and clammy may be related to low glucose levels.  Feeling hot and dry may indicate high glucose levels.  If  you feel these symptoms, check your blood sugar.  Make regular podiatry appointments in order to have feet checked for wounds and toe nails cut by a doctor to prevent infections.  Follow up with the endocrine team within 1 week of discharge.  You should be monitoring your sugar before each meal and at bedtime - document these numbers and bring the record to your endocrine appointment.  Please call the endocrine depart to make a follow up appointment.  Please call [phone] to make an appointment  Secondary Diagnosis:	RA (rheumatoid arthritis)  Assessment and plan of treatment:	Continue medication regimen as you were previously to admission.  Follow up with our rheumatologist outpatient.  Continue Sulfasalazine and prednisone  Secondary Diagnosis:	Essential (primary) hypertension  Assessment and plan of treatment:	Continue blood pressure medication regimen as directed. Monitor for any visual changes, headaches or dizziness.  Monitor blood pressure regularly.  Follow up with your PCP for further management for high blood pressure.  Secondary Diagnosis:	Chronic diastolic congestive heart failure  Assessment and plan of treatment:	Continue lasix  Follow up with your cardiologist for further management and for echo evaluation.  Secondary Diagnosis:	High cholesterol  Assessment and plan of treatment:	Continue cholesterol control medications. Continue DASH diet. Follow up with your PCP within 1 week of discharge for further management and monitoring of lipid and cholesterol panels.  Secondary Diagnosis:	Adult Hypothyroidism  Assessment and plan of treatment:	Continue synthroid regimen as ordered.  Monitor for any symptoms of decreased or elevated thyroid function.  You dose of synthroid was increased, please follow up with your PCP and or endocrinologist in 6 weeks for thyroid function evaluation and medication management Principal Discharge DX:	Chronic obstructive pulmonary disease with acute lower respiratory infection  Goal:	improved  Assessment and plan of treatment:	Follow up with the pulmonary team within 1 weeks of discharge.  Continue prednisone 10mg oral daily as you were taking before admission.  FOLLOW UP: 6/11  @ 1:30 pm with Dr. Gita Lisker - [address] [place] [address]   *** YOU NEED TO OBTAIN A REFERRAL FROM YOUR PCP FOR THIS APPOINTMENT****  Secondary Diagnosis:	Type 2 diabetes mellitus  Assessment and plan of treatment:	Continue consistent carbohydrate diet.  Monitor blood glucose levels throughout the day before meals and at bedtime.  Record blood sugars and bring to outpatient providers appointment in order to be reviewed by your doctor for management modifications.  Be aware of diabetes management symptoms including feeling cool and clammy may be related to low glucose levels.  Feeling hot and dry may indicate high glucose levels.  If  you feel these symptoms, check your blood sugar.  Make regular podiatry appointments in order to have feet checked for wounds and toe nails cut by a doctor to prevent infections.  Follow up with the endocrine team within 1 week of discharge.  You should be monitoring your sugar before each meal and at bedtime - document these numbers and bring the record to your endocrine appointment.  Please call the endocrine depart to make a follow up appointment.  YOU HAVE AN APPOINTMENT WITH THE DIABETES EDUCATOR ON JUNE 11. 2018 AT 2 P.M . [facility] TO REVIEW YOUR MEDICATIONS AND BLOOD SUGAR READINGS.   YOU HAVE AN APPOINTMENT TO SEE DR. LOWERY ON JULY 24, 2018 AT 12:00   [facility] FOR FURTHER EVALUATION AND MANAGEMENT OF YOU DIABETES  *** YOU NEED TO OBTAIN A REFERRAL FROM YOUR PCP FOR THESE APPOINTMENTS***  Secondary Diagnosis:	RA (rheumatoid arthritis)  Assessment and plan of treatment:	Continue medication regimen as you were previously to admission.  Follow up with our rheumatologist outpatient.  Continue Sulfasalazine and prednisone  Secondary Diagnosis:	Essential (primary) hypertension  Assessment and plan of treatment:	Continue blood pressure medication regimen as directed. Monitor for any visual changes, headaches or dizziness.  Monitor blood pressure regularly.  Follow up with your PCP for further management for high blood pressure.  Secondary Diagnosis:	Chronic diastolic congestive heart failure  Assessment and plan of treatment:	Continue lasix -  Follow up with your cardiologist for further management and for echo evaluation.  Secondary Diagnosis:	High cholesterol  Assessment and plan of treatment:	Continue cholesterol control medications. Continue DASH diet. Follow up with your PCP within 1 week of discharge for further management and monitoring of lipid and cholesterol panels.  Secondary Diagnosis:	Adult Hypothyroidism  Assessment and plan of treatment:	Continue synthroid regimen as ordered.  Monitor for any symptoms of decreased or elevated thyroid function.  You dose of synthroid was increased, please follow up with your PCP and or endocrinologist in 6 weeks for thyroid function evaluation and medication management Principal Discharge DX:	Chronic obstructive pulmonary disease with acute lower respiratory infection  Goal:	improved  Assessment and plan of treatment:	Follow up with the pulmonary team within 1 weeks of discharge.  Continue prednisone 10mg oral daily as you were taking before admission.  FOLLOW UP: 6/11  @ 1:30 pm with Dr. Gita Lisker - [address] [place] [address]   *** YOU NEED TO OBTAIN A REFERRAL FROM YOUR PCP FOR THIS APPOINTMENT****  OBSTRUCTIVE SLEEP APNEA NOTED ON EXAN DURING YOUR HOSPITAL STAY - QUALIFIED FOR NOCTURNAL BIPAP*  Secondary Diagnosis:	Type 2 diabetes mellitus  Assessment and plan of treatment:	Continue consistent carbohydrate diet.  Monitor blood glucose levels throughout the day before meals and at bedtime.  Record blood sugars and bring to outpatient providers appointment in order to be reviewed by your doctor for management modifications.  Be aware of diabetes management symptoms including feeling cool and clammy may be related to low glucose levels.  Feeling hot and dry may indicate high glucose levels.  If  you feel these symptoms, check your blood sugar.  Make regular podiatry appointments in order to have feet checked for wounds and toe nails cut by a doctor to prevent infections.  Follow up with the endocrine team within 1 week of discharge.  You should be monitoring your sugar before each meal and at bedtime - document these numbers and bring the record to your endocrine appointment.  Please call the endocrine depart to make a follow up appointment.  YOU HAVE AN APPOINTMENT WITH THE DIABETES EDUCATOR ON JUNE 11. 2018 AT 2 P.M . [facility] TO REVIEW YOUR MEDICATIONS AND BLOOD SUGAR READINGS.   YOU HAVE AN APPOINTMENT TO SEE DR. LOWERY ON JULY 24, 2018 AT 12:00   [facility] FOR FURTHER EVALUATION AND MANAGEMENT OF YOU DIABETES  *** YOU NEED TO OBTAIN A REFERRAL FROM YOUR PCP FOR THESE APPOINTMENTS***  Secondary Diagnosis:	RA (rheumatoid arthritis)  Assessment and plan of treatment:	Continue medication regimen as you were previously to admission.  Follow up with our rheumatologist outpatient.  Continue Sulfasalazine and prednisone  Secondary Diagnosis:	Essential (primary) hypertension  Assessment and plan of treatment:	Continue blood pressure medication regimen as directed. Monitor for any visual changes, headaches or dizziness.  Monitor blood pressure regularly.  Follow up with your PCP for further management for high blood pressure.  Secondary Diagnosis:	Chronic diastolic congestive heart failure  Assessment and plan of treatment:	Continue lasix -  Follow up with your cardiologist for further management and for echo evaluation.  Secondary Diagnosis:	High cholesterol  Assessment and plan of treatment:	Continue cholesterol control medications. Continue DASH diet. Follow up with your PCP within 1 week of discharge for further management and monitoring of lipid and cholesterol panels.  Secondary Diagnosis:	Adult Hypothyroidism  Assessment and plan of treatment:	Continue synthroid regimen as ordered.  Monitor for any symptoms of decreased or elevated thyroid function.  You dose of synthroid was increased, please follow up with your PCP and or endocrinologist in 6 weeks for thyroid function evaluation and medication management Principal Discharge DX:	Chronic obstructive pulmonary disease with acute lower respiratory infection  Goal:	improved  Assessment and plan of treatment:	Follow up with the pulmonary team within 1 weeks of discharge.  Continue prednisone 10mg oral daily as you were taking before admission.  FOLLOW UP: 6/11  @ 1:30 pm with Dr. Gita Lisker - [address] [place] [address]   *** YOU NEED TO OBTAIN A REFERRAL FROM YOUR PCP FOR THIS APPOINTMENT****  OBSTRUCTIVE SLEEP APNEA NOTED ON EXAN DURING YOUR HOSPITAL STAY - QUALIFIED FOR NOCTURNAL BIPAP*  Secondary Diagnosis:	Type 2 diabetes mellitus  Assessment and plan of treatment:	Continue consistent carbohydrate diet.  Monitor blood glucose levels throughout the day before meals and at bedtime.  Record blood sugars and bring to outpatient providers appointment in order to be reviewed by your doctor for management modifications.  Be aware of diabetes management symptoms including feeling cool and clammy may be related to low glucose levels.  Feeling hot and dry may indicate high glucose levels.  If  you feel these symptoms, check your blood sugar.  Make regular podiatry appointments in order to have feet checked for wounds and toe nails cut by a doctor to prevent infections.  Follow up with the endocrine team within 1 week of discharge.  You should be monitoring your sugar before each meal and at bedtime - document these numbers and bring the record to your endocrine appointment.  Please call the endocrine depart to make a follow up appointment.  YOU HAVE AN APPOINTMENT WITH THE DIABETES EDUCATOR ON JUNE 11. 2018 AT 2 P.M . [facility] [place] TO REVIEW YOUR MEDICATIONS AND BLOOD SUGAR READINGS.   YOU HAVE AN APPOINTMENT TO SEE DR. LOWERY ON JULY 24, 2018 AT 12:00   [facility] [place] FOR FURTHER EVALUATION AND MANAGEMENT OF YOU DIABETES  *** YOU NEED TO OBTAIN A REFERRAL FROM YOUR PCP FOR THESE APPOINTMENTS***  Secondary Diagnosis:	RA (rheumatoid arthritis)  Assessment and plan of treatment:	Continue medication regimen as you were previously to admission.  Follow up with our rheumatologist outpatient.  Continue Sulfasalazine and prednisone  Secondary Diagnosis:	Essential (primary) hypertension  Assessment and plan of treatment:	Continue blood pressure medication regimen as directed. Monitor for any visual changes, headaches or dizziness.  Monitor blood pressure regularly.  Follow up with your PCP for further management for high blood pressure.  Secondary Diagnosis:	Chronic diastolic congestive heart failure  Assessment and plan of treatment:	Continue lasix -  Follow up with your cardiologist for further management and for echo evaluation.  Follow up with Dr. Fried outpatient  Secondary Diagnosis:	High cholesterol  Assessment and plan of treatment:	Continue cholesterol control medications. Continue DASH diet. Follow up with your PCP within 1 week of discharge for further management and monitoring of lipid and cholesterol panels.  Secondary Diagnosis:	Adult Hypothyroidism  Assessment and plan of treatment:	Continue synthroid regimen as ordered.  Monitor for any symptoms of decreased or elevated thyroid function.  You dose of synthroid was increased, please follow up with your PCP and or endocrinologist in 6 weeks for thyroid function evaluation and medication management Principal Discharge DX:	Chronic obstructive pulmonary disease with acute lower respiratory infection  Goal:	improved  Assessment and plan of treatment:	Follow up with the pulmonary team within 1 week of discharge.  Continue prednisone 10mg oral daily as you were taking before admission.  FOLLOW UP: 6/11  @ 1:30 pm with Dr. Gita Lisker - [address] [place] [address]   *** YOU NEED TO OBTAIN A REFERRAL FROM YOUR PCP FOR THIS APPOINTMENT****  OBSTRUCTIVE SLEEP APNEA NOTED ON EXAM DURING YOUR HOSPITAL STAY - QUALIFIED FOR NOCTURNAL BIPAP*  Secondary Diagnosis:	Type 2 diabetes mellitus  Assessment and plan of treatment:	Continue consistent carbohydrate diet.  Monitor blood glucose levels throughout the day before meals and at bedtime.  Record blood sugars and bring to outpatient providers appointment in order to be reviewed by your doctor for management modifications.  Be aware of diabetes management symptoms including feeling cool and clammy may be related to low glucose levels.  Feeling hot and dry may indicate high glucose levels.  If  you feel these symptoms, check your blood sugar.  Make regular podiatry appointments in order to have feet checked for wounds and toe nails cut by a doctor to prevent infections.  Follow up with the endocrine team within 1 week of discharge.  You should be monitoring your sugar before each meal and at bedtime - document these numbers and bring the record to your endocrine appointment.  Please call the endocrine depart to make a follow up appointment.  YOU HAVE AN APPOINTMENT WITH THE DIABETES EDUCATOR ON JUNE 11. 2018 AT 2 P.M . [facility] TO REVIEW YOUR MEDICATIONS AND BLOOD SUGAR READINGS.   YOU HAVE AN APPOINTMENT TO SEE DR. LOWERY ON JULY 24, 2018 AT 12:00   [facility] FOR FURTHER EVALUATION AND MANAGEMENT OF YOU DIABETES  *** YOU NEED TO OBTAIN A REFERRAL FROM YOUR PCP FOR THESE APPOINTMENTS***  Secondary Diagnosis:	RA (rheumatoid arthritis)  Assessment and plan of treatment:	Continue medication regimen as you were previously to admission.  Follow up with our rheumatologist outpatient.  Continue Sulfasalazine and prednisone  Secondary Diagnosis:	Essential (primary) hypertension  Assessment and plan of treatment:	Continue blood pressure medication regimen as directed. Monitor for any visual changes, headaches or dizziness.  Monitor blood pressure regularly.  Follow up with your PCP for further management for high blood pressure.  Secondary Diagnosis:	Chronic diastolic congestive heart failure  Assessment and plan of treatment:	Continue lasix -  Follow up with your cardiologist for further management and for echo evaluation.  Follow up with Dr. Fried outpatient  Secondary Diagnosis:	High cholesterol  Assessment and plan of treatment:	Continue cholesterol control medications. Continue DASH diet. Follow up with your PCP within 1 week of discharge for further management and monitoring of lipid and cholesterol panels.  Secondary Diagnosis:	Adult Hypothyroidism  Assessment and plan of treatment:	Continue synthroid regimen as ordered.  Monitor for any symptoms of decreased or elevated thyroid function.  You dose of synthroid was increased, please follow up with your PCP and or endocrinologist in 6 weeks for thyroid function evaluation and medication management Principal Discharge DX:	Chronic obstructive pulmonary disease with acute lower respiratory infection  Goal:	improved  Assessment and plan of treatment:	Follow up with the pulmonary team within 1 week of discharge.  Continue prednisone 10mg oral daily as you were taking before admission.  FOLLOW UP: 6/11  @ 12:45 pm with Dr. Gita Lisker - [address] [place] [address]   *** YOU NEED TO OBTAIN A REFERRAL FROM YOUR PCP FOR THIS APPOINTMENT****  OBSTRUCTIVE SLEEP APNEA NOTED ON EXAM DURING YOUR HOSPITAL STAY - QUALIFIED FOR NOCTURNAL BIPAP*  Secondary Diagnosis:	Type 2 diabetes mellitus  Assessment and plan of treatment:	Continue consistent carbohydrate diet.  Monitor blood glucose levels throughout the day before meals and at bedtime.  Record blood sugars and bring to outpatient providers appointment in order to be reviewed by your doctor for management modifications.  Be aware of diabetes management symptoms including feeling cool and clammy may be related to low glucose levels.  Feeling hot and dry may indicate high glucose levels.  If  you feel these symptoms, check your blood sugar.  Make regular podiatry appointments in order to have feet checked for wounds and toe nails cut by a doctor to prevent infections.  Follow up with the endocrine team within 1 week of discharge.  You should be monitoring your sugar before each meal and at bedtime - document these numbers and bring the record to your endocrine appointment.  Please call the endocrine depart to make a follow up appointment.  YOU HAVE AN APPOINTMENT WITH THE DIABETES EDUCATOR ON JUNE 11. 2018 AT 2 P.M . [facility] TO REVIEW YOUR MEDICATIONS AND BLOOD SUGAR READINGS.   YOU HAVE AN APPOINTMENT TO SEE DR. LOWERY ON JULY 24, 2018 AT 12:00   [facility] FOR FURTHER EVALUATION AND MANAGEMENT OF YOU DIABETES  *** YOU NEED TO OBTAIN A REFERRAL FROM YOUR PCP FOR THESE APPOINTMENTS***  Secondary Diagnosis:	RA (rheumatoid arthritis)  Assessment and plan of treatment:	Continue medication regimen as you were previously to admission.  Follow up with our rheumatologist outpatient.  Continue Sulfasalazine and prednisone  Secondary Diagnosis:	Essential (primary) hypertension  Assessment and plan of treatment:	Continue blood pressure medication regimen as directed. Monitor for any visual changes, headaches or dizziness.  Monitor blood pressure regularly.  Follow up with your PCP for further management for high blood pressure.  Secondary Diagnosis:	Chronic diastolic congestive heart failure  Assessment and plan of treatment:	Continue lasix -  Follow up with your cardiologist for further management and for echo evaluation.  Follow up with Dr. Fried outpatient  Secondary Diagnosis:	High cholesterol  Assessment and plan of treatment:	Continue cholesterol control medications. Continue DASH diet. Follow up with your PCP within 1 week of discharge for further management and monitoring of lipid and cholesterol panels.  Secondary Diagnosis:	Adult Hypothyroidism  Assessment and plan of treatment:	Continue synthroid regimen as ordered.  Monitor for any symptoms of decreased or elevated thyroid function.  You dose of synthroid was increased, please follow up with your PCP and or endocrinologist in 6 weeks for thyroid function evaluation and medication management Principal Discharge DX:	Chronic obstructive pulmonary disease with acute lower respiratory infection  Goal:	improved  Assessment and plan of treatment:	Follow up with the pulmonary team within 1 week of discharge.  Continue prednisone 10mg oral daily as you were taking before admission.  FOLLOW UP: 6/11  @ 12:45 pm with Dr. Gita Lisker - [address] [place] [address]   *** YOU NEED TO OBTAIN A REFERRAL FROM YOUR PCP FOR THIS APPOINTMENT****  OBSTRUCTIVE SLEEP APNEA NOTED ON EXAM DURING YOUR HOSPITAL STAY - QUALIFIED FOR NOCTURNAL BIPAP*  Secondary Diagnosis:	Type 2 diabetes mellitus  Assessment and plan of treatment:	Continue consistent carbohydrate diet.  Monitor blood glucose levels throughout the day before meals and at bedtime.  Record blood sugars and bring to outpatient providers appointment in order to be reviewed by your doctor for management modifications.  Be aware of diabetes management symptoms including feeling cool and clammy may be related to low glucose levels.  Feeling hot and dry may indicate high glucose levels.  If  you feel these symptoms, check your blood sugar.  Make regular podiatry appointments in order to have feet checked for wounds and toe nails cut by a doctor to prevent infections.  Follow up with the endocrine team.  You should be monitoring your sugar before each meal and at bedtime - document these numbers and bring the record to your endocrine appointment.    YOU HAVE AN APPOINTMENT WITH THE DIABETES EDUCATOR ON JUNE 11. 2018 AT 2 P.M . [facility] TO REVIEW YOUR MEDICATIONS AND BLOOD SUGAR READINGS.   YOU HAVE AN APPOINTMENT TO SEE DR. LOWERY ON JULY 24, 2018 AT 12:00   [facility] FOR FURTHER EVALUATION AND MANAGEMENT OF YOU DIABETES  *** YOU NEED TO OBTAIN A REFERRAL FROM YOUR PCP FOR THESE APPOINTMENTS***  Secondary Diagnosis:	RA (rheumatoid arthritis)  Assessment and plan of treatment:	Continue medication regimen as you were previously to admission.  Follow up with our rheumatologist outpatient.  Continue Sulfasalazine and prednisone  Secondary Diagnosis:	Essential (primary) hypertension  Assessment and plan of treatment:	Continue blood pressure medication regimen as directed. Monitor for any visual changes, headaches or dizziness.  Monitor blood pressure regularly.  Follow up with your PCP for further management for high blood pressure.  Secondary Diagnosis:	Chronic diastolic congestive heart failure  Assessment and plan of treatment:	Continue lasix -  Follow up with your cardiologist for further management and for echo evaluation.  Follow up with Dr. Fried outpatient  Secondary Diagnosis:	High cholesterol  Assessment and plan of treatment:	Continue cholesterol control medications. Continue DASH diet. Follow up with your PCP within 1 week of discharge for further management and monitoring of lipid and cholesterol panels.  Secondary Diagnosis:	Adult Hypothyroidism  Assessment and plan of treatment:	Continue synthroid regimen as ordered.  Monitor for any symptoms of decreased or elevated thyroid function.  You dose of synthroid was increased, please follow up with your PCP and or endocrinologist in 6 weeks for thyroid function evaluation and medication management Principal Discharge DX:	Chronic obstructive pulmonary disease with acute lower respiratory infection  Goal:	improved  Assessment and plan of treatment:	Follow up with the pulmonary team within 1 week of discharge.  Continue prednisone 10mg oral daily as you were taking before admission.  FOLLOW UP: 6/11  @ 12:45 pm with Dr. Gita Lisker - [address] [place] [address]   *** YOU NEED TO OBTAIN A REFERRAL FROM YOUR PCP FOR THIS APPOINTMENT****  OBSTRUCTIVE SLEEP APNEA NOTED ON EXAM DURING YOUR HOSPITAL STAY - QUALIFIED FOR NOCTURNAL BIPAP*  Secondary Diagnosis:	Type 2 diabetes mellitus  Assessment and plan of treatment:	Continue consistent carbohydrate diet.  Monitor blood glucose levels throughout the day before meals and at bedtime.  Record blood sugars and bring to outpatient providers appointment in order to be reviewed by your doctor for management modifications.  Be aware of diabetes management symptoms including feeling cool and clammy may be related to low glucose levels.  Feeling hot and dry may indicate high glucose levels.  If  you feel these symptoms, check your blood sugar.  Make regular podiatry appointments in order to have feet checked for wounds and toe nails cut by a doctor to prevent infections.  Follow up with the endocrine team.  You should be monitoring your sugar before each meal and at bedtime - document these numbers and bring the record to your endocrine appointment.    YOU HAVE AN APPOINTMENT WITH THE DIABETES EDUCATOR ON JUNE 11. 2018 AT 2 P.M . [facility] TO [facility]VIEW YOUR MEDICATIONS AND BLOOD SUGAR READINGS.   YOU HAVE AN APPOINTMENT TO SEE DR. BRASWELL ON JULY 3, 2018 AT 10:00am   [facility] FOR FURTHER EVALUATION AND MANAGEMENT OF YOU DIABETES  *** YOU NEED TO OBTAIN A REFERRAL FROM YOUR PCP FOR THESE APPOINTMENTS***  Secondary Diagnosis:	RA (rheumatoid arthritis)  Assessment and plan of treatment:	Continue medication regimen as you were previously to admission.  Follow up with our rheumatologist outpatient.  Continue Sulfasalazine and prednisone  Secondary Diagnosis:	Essential (primary) hypertension  Assessment and plan of treatment:	Continue blood pressure medication regimen as directed. Monitor for any visual changes, headaches or dizziness.  Monitor blood pressure regularly.  Follow up with your PCP for further management for high blood pressure.  Secondary Diagnosis:	Chronic diastolic congestive heart failure  Assessment and plan of treatment:	Continue lasix -  Follow up with your cardiologist for further management and for echo evaluation.  Follow up with Dr. Fried outpatient  Secondary Diagnosis:	High cholesterol  Assessment and plan of treatment:	Continue cholesterol control medications. Continue DASH diet. Follow up with your PCP within 1 week of discharge for further management and monitoring of lipid and cholesterol panels.  Secondary Diagnosis:	Adult Hypothyroidism  Assessment and plan of treatment:	Continue synthroid regimen as ordered.  Monitor for any symptoms of decreased or elevated thyroid function.  You dose of synthroid was increased, please follow up with your PCP and or endocrinologist in 6 weeks for thyroid function evaluation and medication management Principal Discharge DX:	Chronic obstructive pulmonary disease with acute lower respiratory infection  Goal:	improved  Assessment and plan of treatment:	Follow up with the pulmonary team on 6/13 at 12:45pm.  Continue prednisone 10mg oral daily as you were taking before admission.  FOLLOW UP: 6/13  @ 12:45 pm with Dr. Gita Lisker - [address] [place] [address]   *** YOU NEED TO OBTAIN A REFERRAL FROM YOUR PCP FOR THIS APPOINTMENT****  OBSTRUCTIVE SLEEP APNEA NOTED ON EXAM DURING YOUR HOSPITAL STAY - QUALIFIED FOR NOCTURNAL BIPAP*  Secondary Diagnosis:	Type 2 diabetes mellitus  Assessment and plan of treatment:	Continue consistent carbohydrate diet.  Monitor blood glucose levels throughout the day before meals and at bedtime.  Record blood sugars and bring to outpatient providers appointment in order to be reviewed by your doctor for management modifications.  Be aware of diabetes management symptoms including feeling cool and clammy may be related to low glucose levels.  Feeling hot and dry may indicate high glucose levels.  If  you feel these symptoms, check your blood sugar.  Make regular podiatry appointments in order to have feet checked for wounds and toe nails cut by a doctor to prevent infections.  Follow up with the endocrine team.  You should be monitoring your sugar before each meal and at bedtime - document these numbers and bring the record to your endocrine appointment.    YOU HAVE AN APPOINTMENT WITH THE DIABETES EDUCATOR ON JUNE 13. 2018 AT 10:30am . [facility] TO REVIEW YOUR MEDICATIONS AND BLOOD SUGAR READINGS.   YOU HAVE AN APPOINTMENT TO SEE DR. BRASWELL ON JULY 3, 2018 AT 10:00am   [facility] FOR FURTHER EVALUATION AND MANAGEMENT OF YOU DIABETES  *** YOU NEED TO OBTAIN A REFERRAL FROM YOUR PCP FOR THESE APPOINTMENTS***  Secondary Diagnosis:	RA (rheumatoid arthritis)  Assessment and plan of treatment:	Continue medication regimen as you were previously to admission.  Follow up with our rheumatologist outpatient.  Continue Sulfasalazine and prednisone  Secondary Diagnosis:	Essential (primary) hypertension  Assessment and plan of treatment:	Continue blood pressure medication regimen as directed. Monitor for any visual changes, headaches or dizziness.  Monitor blood pressure regularly.  Follow up with your PCP for further management for high blood pressure.  Secondary Diagnosis:	Chronic diastolic congestive heart failure  Assessment and plan of treatment:	Continue lasix -  Follow up with your cardiologist for further management and for echo evaluation.  Follow up with Dr. Fried outpatient  Secondary Diagnosis:	High cholesterol  Assessment and plan of treatment:	Continue cholesterol control medications. Continue DASH diet. Follow up with your PCP within 1 week of discharge for further management and monitoring of lipid and cholesterol panels.  Secondary Diagnosis:	Adult Hypothyroidism  Assessment and plan of treatment:	Continue synthroid regimen as ordered.  Monitor for any symptoms of decreased or elevated thyroid function.  You dose of synthroid was increased, please follow up with your PCP and or endocrinologist in 6 weeks for thyroid function evaluation and medication management

## 2018-05-28 NOTE — DISCHARGE NOTE ADULT - SECONDARY DIAGNOSIS.
Type 2 diabetes mellitus RA (rheumatoid arthritis) Essential (primary) hypertension Chronic diastolic congestive heart failure High cholesterol Adult Hypothyroidism

## 2018-05-28 NOTE — DISCHARGE NOTE ADULT - MEDICATION SUMMARY - MEDICATIONS TO CHANGE
I will SWITCH the dose or number of times a day I take the medications listed below when I get home from the hospital:    traMADol 50 mg oral tablet  -- 1.5 tab(s) by mouth every 6 hours    gabapentin 300 mg oral capsule  -- 1 cap(s) by mouth 3 times a day    levothyroxine 100 mcg (0.1 mg) oral tablet  -- 1 tab(s) by mouth once a day

## 2018-05-28 NOTE — DISCHARGE NOTE ADULT - PLAN OF CARE
Follow up with the pulonary team within 1 weeks of dsicharge.  Continue prednisone 10mg oral daily as you were taking before admission. Continue consistent carbohydrate diet.  Monitor blood glucose levels throughout the day before meals and at bedtime.  Record blood sugars and bring to outpatient providers appointment in order to be reviewed by your doctor for management modifications.  Be aware of diabetes management symptoms including feeling cool and clammy may be related to low glucose levels.  Feeling hot and dry may indicate high glucose levels.  If  you feel these symptoms, check your blood sugar.  Make regular podiatry appointments in order to have feet checked for wounds and toe nails cut by a doctor to prevent infections.  Follow up with the endocrine team within 1 week of discharge.  You should be monitoring your sugar before each meal and at bedtime - document these numbers and bring the record to your endocrine appointment.  Please call the endocrine depart to make a follow up appointment.  Please call [phone] to make an appointment Continue medication regimen as you were previously to admission.  Follow up with our rheumatologist outpatient. Continue blood pressure medication regimen as directed. Monitor for any visual changes, headaches or dizziness.  Monitor blood pressure regularly.  Follow up with your PCP for further management for high blood pressure. Continue lasix Continue cholesterol control medications. Continue DASH diet. Follow up with your PCP within 1 week of discharge for further management and monitoring of lipid and cholesterol panels. Follow up with the pulmonary team within 1 weeks of discharge.  Continue prednisone 10mg oral daily as you were taking before admission. Continue lasix  Follow up with your cardiologist for further management and for echo evaluation. Continue synthroid regimen as ordered.  Monitor for any symptoms of decreased or elevated thyroid function.  You dose of synthroid was increased, please follow up with your PCP and or endocrinologist in 6 weeks for thyroid function evaluation and medication management improved Follow up with the pulmonary team within 1 weeks of discharge.  Continue prednisone 10mg oral daily as you were taking before admission.  FOLLOW UP: 6/11  @ 1:30 pm with Dr. Gita Lisker - [address] [place] [address]   *** YOU NEED TO OBTAIN A REFERRAL FROM YOUR PCP FOR THIS APPOINTMENT**** Continue medication regimen as you were previously to admission.  Follow up with our rheumatologist outpatient.  Continue Sulfasalazine and prednisone Continue consistent carbohydrate diet.  Monitor blood glucose levels throughout the day before meals and at bedtime.  Record blood sugars and bring to outpatient providers appointment in order to be reviewed by your doctor for management modifications.  Be aware of diabetes management symptoms including feeling cool and clammy may be related to low glucose levels.  Feeling hot and dry may indicate high glucose levels.  If  you feel these symptoms, check your blood sugar.  Make regular podiatry appointments in order to have feet checked for wounds and toe nails cut by a doctor to prevent infections.  Follow up with the endocrine team within 1 week of discharge.  You should be monitoring your sugar before each meal and at bedtime - document these numbers and bring the record to your endocrine appointment.  Please call the endocrine depart to make a follow up appointment.  YOU HAVE AN APPOINTMENT WITH THE DIABETES EDUCATOR ON JUNE 11. 2018 AT 2 P.M . [facility] TO REVIEW YOUR MEDICATIONS AND BLOOD SUGAR READINGS.   YOU HAVE AN APPOINTMENT TO SEE DR. LOWERY ON JULY 24, 2018 AT 12:00   [facility] FOR FURTHER EVALUATION AND MANAGEMENT OF YOU DIABETES  *** YOU NEED TO OBTAIN A REFERRAL FROM YOUR PCP FOR THESE APPOINTMENTS*** Continue lasix -  Follow up with your cardiologist for further management and for echo evaluation. Follow up with the pulmonary team within 1 weeks of discharge.  Continue prednisone 10mg oral daily as you were taking before admission.  FOLLOW UP: 6/11  @ 1:30 pm with Dr. Gita Lisker - [address] [place] [address]   *** YOU NEED TO OBTAIN A REFERRAL FROM YOUR PCP FOR THIS APPOINTMENT****  OBSTRUCTIVE SLEEP APNEA NOTED ON EXAN DURING YOUR HOSPITAL STAY - QUALIFIED FOR NOCTURNAL BIPAP* Continue lasix -  Follow up with your cardiologist for further management and for echo evaluation.  Follow up with Dr. Fariha aldridge Follow up with the pulmonary team within 1 week of discharge.  Continue prednisone 10mg oral daily as you were taking before admission.  FOLLOW UP: 6/11  @ 1:30 pm with Dr. Gita Lisker - [address] [place] [address]   *** YOU NEED TO OBTAIN A REFERRAL FROM YOUR PCP FOR THIS APPOINTMENT****  OBSTRUCTIVE SLEEP APNEA NOTED ON EXAM DURING YOUR HOSPITAL STAY - QUALIFIED FOR NOCTURNAL BIPAP* Follow up with the pulmonary team within 1 week of discharge.  Continue prednisone 10mg oral daily as you were taking before admission.  FOLLOW UP: 6/11  @ 12:45 pm with Dr. Gita Lisker - [address] [place] [address]   *** YOU NEED TO OBTAIN A REFERRAL FROM YOUR PCP FOR THIS APPOINTMENT****  OBSTRUCTIVE SLEEP APNEA NOTED ON EXAM DURING YOUR HOSPITAL STAY - QUALIFIED FOR NOCTURNAL BIPAP* Continue consistent carbohydrate diet.  Monitor blood glucose levels throughout the day before meals and at bedtime.  Record blood sugars and bring to outpatient providers appointment in order to be reviewed by your doctor for management modifications.  Be aware of diabetes management symptoms including feeling cool and clammy may be related to low glucose levels.  Feeling hot and dry may indicate high glucose levels.  If  you feel these symptoms, check your blood sugar.  Make regular podiatry appointments in order to have feet checked for wounds and toe nails cut by a doctor to prevent infections.  Follow up with the endocrine team.  You should be monitoring your sugar before each meal and at bedtime - document these numbers and bring the record to your endocrine appointment.    YOU HAVE AN APPOINTMENT WITH THE DIABETES EDUCATOR ON JUNE 11. 2018 AT 2 P.M . [facility] TO REVIEW YOUR MEDICATIONS AND BLOOD SUGAR READINGS.   YOU HAVE AN APPOINTMENT TO SEE DR. LOWERY ON JULY 24, 2018 AT 12:00   [facility] FOR FURTHER EVALUATION AND MANAGEMENT OF YOU DIABETES  *** YOU NEED TO OBTAIN A REFERRAL FROM YOUR PCP FOR THESE APPOINTMENTS*** Continue consistent carbohydrate diet.  Monitor blood glucose levels throughout the day before meals and at bedtime.  Record blood sugars and bring to outpatient providers appointment in order to be reviewed by your doctor for management modifications.  Be aware of diabetes management symptoms including feeling cool and clammy may be related to low glucose levels.  Feeling hot and dry may indicate high glucose levels.  If  you feel these symptoms, check your blood sugar.  Make regular podiatry appointments in order to have feet checked for wounds and toe nails cut by a doctor to prevent infections.  Follow up with the endocrine team.  You should be monitoring your sugar before each meal and at bedtime - document these numbers and bring the record to your endocrine appointment.    YOU HAVE AN APPOINTMENT WITH THE DIABETES EDUCATOR ON JUNE 11. 2018 AT 2 P.M . [facility] TO REVIEW YOUR MEDICATIONS AND BLOOD SUGAR READINGS.   YOU HAVE AN APPOINTMENT TO SEE DR. BRASWELL ON JULY 3, 2018 AT 10:00am   [facility] FOR FURTHER EVALUATION AND MANAGEMENT OF YOU DIABETES  *** YOU NEED TO OBTAIN A REFERRAL FROM YOUR PCP FOR THESE APPOINTMENTS*** Follow up with the pulmonary team on 6/13 at 12:45pm.  Continue prednisone 10mg oral daily as you were taking before admission.  FOLLOW UP: 6/13  @ 12:45 pm with Dr. Gita Lisker - [address] [place] [address]   *** YOU NEED TO OBTAIN A REFERRAL FROM YOUR PCP FOR THIS APPOINTMENT****  OBSTRUCTIVE SLEEP APNEA NOTED ON EXAM DURING YOUR HOSPITAL STAY - QUALIFIED FOR NOCTURNAL BIPAP* Continue consistent carbohydrate diet.  Monitor blood glucose levels throughout the day before meals and at bedtime.  Record blood sugars and bring to outpatient providers appointment in order to be reviewed by your doctor for management modifications.  Be aware of diabetes management symptoms including feeling cool and clammy may be related to low glucose levels.  Feeling hot and dry may indicate high glucose levels.  If  you feel these symptoms, check your blood sugar.  Make regular podiatry appointments in order to have feet checked for wounds and toe nails cut by a doctor to prevent infections.  Follow up with the endocrine team.  You should be monitoring your sugar before each meal and at bedtime - document these numbers and bring the record to your endocrine appointment.    YOU HAVE AN APPOINTMENT WITH THE DIABETES EDUCATOR ON JUNE 13. 2018 AT 10:30am . [facility] TO REVIEW YOUR MEDICATIONS AND BLOOD SUGAR READINGS.   YOU HAVE AN APPOINTMENT TO SEE DR. BRASWELL ON JULY 3, 2018 AT 10:00am   [facility] FOR FURTHER EVALUATION AND MANAGEMENT OF YOU DIABETES  *** YOU NEED TO OBTAIN A REFERRAL FROM YOUR PCP FOR THESE APPOINTMENTS***

## 2018-05-28 NOTE — PROGRESS NOTE ADULT - SUBJECTIVE AND OBJECTIVE BOX
CHIEF COMPLAINT:    Interval Events:    REVIEW OF SYSTEMS:  Constitutional:   Eyes:  ENT:  CV:  Resp:  GI:  :  MSK:  Integumentary:  Neurological:  Psychiatric:  Endocrine:  Hematologic/Lymphatic:  Allergic/Immunologic:  [ ] All other systems negative  [ ] Unable to assess ROS because ________    OBJECTIVE:  ICU Vital Signs Last 24 Hrs  T(C): 36.9 (28 May 2018 05:34), Max: 37.2 (27 May 2018 21:43)  T(F): 98.5 (28 May 2018 05:34), Max: 98.9 (27 May 2018 21:43)  HR: 86 (28 May 2018 05:34) (80 - 86)  BP: 117/64 (28 May 2018 05:34) (117/64 - 128/81)  BP(mean): --  ABP: --  ABP(mean): --  RR: 18 (28 May 2018 05:34) (18 - 20)  SpO2: 98% (28 May 2018 05:34) (94% - 100%)        CAPILLARY BLOOD GLUCOSE      POCT Blood Glucose.: 123 mg/dL (27 May 2018 21:27)      PHYSICAL EXAM:  General:   HEENT:   Lymph Nodes:  Neck:   Respiratory:   Cardiovascular:   Abdomen:   Extremities:   Skin:   Neurological:  Psychiatry:    HOSPITAL MEDICATIONS:  MEDICATIONS  (STANDING):  acetaminophen   Tablet. 650 milliGRAM(s) Oral every 6 hours  ALBUTerol    90 MICROgram(s) HFA Inhaler 1 Puff(s) Inhalation every 4 hours  amLODIPine   Tablet 5 milliGRAM(s) Oral daily  buDESOnide 160 MICROgram(s)/formoterol 4.5 MICROgram(s) Inhaler 2 Puff(s) Inhalation two times a day  chlorhexidine 4% Liquid 1 Application(s) Topical <User Schedule>  clopidogrel Tablet 75 milliGRAM(s) Oral daily  dextrose 5%. 1000 milliLiter(s) (50 mL/Hr) IV Continuous <Continuous>  dextrose 50% Injectable 12.5 Gram(s) IV Push once  dextrose 50% Injectable 25 Gram(s) IV Push once  dextrose 50% Injectable 25 Gram(s) IV Push once  fluticasone propionate 50 MICROgram(s)/spray Nasal Spray 1 Spray(s) Both Nostrils two times a day  furosemide    Tablet 20 milliGRAM(s) Oral at bedtime  furosemide    Tablet 40 milliGRAM(s) Oral daily  gabapentin 300 milliGRAM(s) Oral two times a day  guaiFENesin  milliGRAM(s) Oral every 12 hours  heparin  Injectable 7500 Unit(s) SubCutaneous every 8 hours  insulin glargine Injectable (LANTUS) 18 Unit(s) SubCutaneous at bedtime  insulin lispro (HumaLOG) corrective regimen sliding scale   SubCutaneous three times a day before meals  insulin lispro (HumaLOG) corrective regimen sliding scale   SubCutaneous at bedtime  insulin lispro Injectable (HumaLOG) 8 Unit(s) SubCutaneous before lunch  insulin lispro Injectable (HumaLOG) 8 Unit(s) SubCutaneous with dinner  insulin lispro Injectable (HumaLOG) 8 Unit(s) SubCutaneous before breakfast  ketotifen 0.025% Ophthalmic Solution 1 Drop(s) Both EYES two times a day  levothyroxine 125 MICROGram(s) Oral daily  loratadine 10 milliGRAM(s) Oral daily  metoprolol succinate ER 50 milliGRAM(s) Oral daily  oxybutynin 5 milliGRAM(s) Oral two times a day  pantoprazole    Tablet 40 milliGRAM(s) Oral before breakfast  polyethylene glycol 3350 17 Gram(s) Oral daily  predniSONE   Tablet 10 milliGRAM(s) Oral daily  simvastatin 20 milliGRAM(s) Oral at bedtime  sodium chloride 3%  Inhalation 3 milliLiter(s) Inhalation every 6 hours  sulfaSALAzine 500 milliGRAM(s) Oral two times a day  tiotropium 18 MICROgram(s) Capsule 1 Capsule(s) Inhalation daily  traMADol 100 milliGRAM(s) Oral three times a day    MEDICATIONS  (PRN):  bisacodyl Suppository 10 milliGRAM(s) Rectal daily PRN Constipation  dextrose 40% Gel 15 Gram(s) Oral once PRN Blood Glucose LESS THAN 70 milliGRAM(s)/deciliter  Diclofenac Topical Gel 1% 1 Application(s) 1 Application(s) Topical two times a day PRN pain  glucagon  Injectable 1 milliGRAM(s) IntraMuscular once PRN Glucose LESS THAN 70 milligrams/deciliter  sodium chloride 0.65% Nasal 1 Spray(s) Both Nostrils four times a day PRN Nasal Congestion      LABS:                        10.9   11.35 )-----------( 272      ( 28 May 2018 05:56 )             34.7     05-28    139  |  96<L>  |  61<H>  ----------------------------<  166<H>  4.2   |  27  |  1.66<H>    Ca    9.5      28 May 2018 05:56  Phos  4.7     05-27  Mg     2.2     05-27          Arterial Blood Gas:  05-28 @ 05:20  7.37/52/96/28/97.8/4.7  ABG lactate: --        MICROBIOLOGY:     RADIOLOGY:  [ ] Reviewed and interpreted by me    PULMONARY FUNCTION TESTS:    EKG:    I&O's Summary CHIEF COMPLAINT: Patient is a 62y old  Female who presents with a chief complaint of dyspnea, fever     Interval Events: Pt reports her "breathing is coming along." Still c.o nasal congestion      REVIEW OF SYSTEMS:  Constitutional: No acute distress  ENT: C/o nasal congestion  CV: Denies  Resp: Cough continues to improve  GI: Still c/o constipation   [X ] All other systems negative      OBJECTIVE:  ICU Vital Signs Last 24 Hrs  T(C): 36.9 (28 May 2018 05:34), Max: 37.2 (27 May 2018 21:43)  T(F): 98.5 (28 May 2018 05:34), Max: 98.9 (27 May 2018 21:43)  HR: 86 (28 May 2018 05:34) (80 - 86)  BP: 117/64 (28 May 2018 05:34) (117/64 - 128/81)  BP(mean): --  ABP: --  ABP(mean): --  RR: 18 (28 May 2018 05:34) (18 - 20)  SpO2: 98% (28 May 2018 05:34) (94% - 100%)        CAPILLARY BLOOD GLUCOSE      POCT Blood Glucose.: 123 mg/dL (27 May 2018 21:27)      Physical Exam:   · Constitutional	detailed exam  · Constitutional Details	well-developed; well-groomed; no distress  · Eyes	detailed exam  · Eyes Details	EOMI; conjunctiva clear  · ENMT	No oral lesions; no gross abnormalities  · Neck	detailed exam  · Neck Details	normal; supple; no JVD  · Breasts	not examined  · Back	not examined  · Respiratory	detailed exam  · Respiratory Details	airway patent; breath sounds equal; good air movement; respirations non-labored; Expiratory wheeze; occasional cough  · Cardiovascular	detailed exam  · Cardiovascular Details	regular rate and rhythm  · Cardiovascular Details	positive S1; positive S2  · Gastrointestinal	detailed exam  · GI Normal	soft; nontender; no distention; BS+  · Genitourinary	not examined  · Rectal	not examined  · Extremities	detailed exam  · Extremities Details	no clubbing; no cyanosis; LE edema+  · Vascular	detailed exam  · Neurological	detailed exam  · Neurological Details	alert and oriented x 3; responds to pain; responds to verbal commands  · Skin	detailed exam  · Skin Details	warm and dry  · Lymph Nodes	not examined  · Musculoskeletal	detailed exam  · Musculoskeletal Details	ROM intact; no joint swelling  · Psychiatric	detailed exam  · Psychiatric Details	normal affect; normal behavior      HOSPITAL MEDICATIONS:  MEDICATIONS  (STANDING):  acetaminophen   Tablet. 650 milliGRAM(s) Oral every 6 hours  ALBUTerol    90 MICROgram(s) HFA Inhaler 1 Puff(s) Inhalation every 4 hours  amLODIPine   Tablet 5 milliGRAM(s) Oral daily  buDESOnide 160 MICROgram(s)/formoterol 4.5 MICROgram(s) Inhaler 2 Puff(s) Inhalation two times a day  chlorhexidine 4% Liquid 1 Application(s) Topical <User Schedule>  clopidogrel Tablet 75 milliGRAM(s) Oral daily  dextrose 5%. 1000 milliLiter(s) (50 mL/Hr) IV Continuous <Continuous>  dextrose 50% Injectable 12.5 Gram(s) IV Push once  dextrose 50% Injectable 25 Gram(s) IV Push once  dextrose 50% Injectable 25 Gram(s) IV Push once  fluticasone propionate 50 MICROgram(s)/spray Nasal Spray 1 Spray(s) Both Nostrils two times a day  furosemide    Tablet 20 milliGRAM(s) Oral at bedtime  furosemide    Tablet 40 milliGRAM(s) Oral daily  gabapentin 300 milliGRAM(s) Oral two times a day  guaiFENesin  milliGRAM(s) Oral every 12 hours  heparin  Injectable 7500 Unit(s) SubCutaneous every 8 hours  insulin glargine Injectable (LANTUS) 18 Unit(s) SubCutaneous at bedtime  insulin lispro (HumaLOG) corrective regimen sliding scale   SubCutaneous three times a day before meals  insulin lispro (HumaLOG) corrective regimen sliding scale   SubCutaneous at bedtime  insulin lispro Injectable (HumaLOG) 8 Unit(s) SubCutaneous before lunch  insulin lispro Injectable (HumaLOG) 8 Unit(s) SubCutaneous with dinner  insulin lispro Injectable (HumaLOG) 8 Unit(s) SubCutaneous before breakfast  ketotifen 0.025% Ophthalmic Solution 1 Drop(s) Both EYES two times a day  levothyroxine 125 MICROGram(s) Oral daily  loratadine 10 milliGRAM(s) Oral daily  metoprolol succinate ER 50 milliGRAM(s) Oral daily  oxybutynin 5 milliGRAM(s) Oral two times a day  pantoprazole    Tablet 40 milliGRAM(s) Oral before breakfast  polyethylene glycol 3350 17 Gram(s) Oral daily  predniSONE   Tablet 10 milliGRAM(s) Oral daily  simvastatin 20 milliGRAM(s) Oral at bedtime  sodium chloride 3%  Inhalation 3 milliLiter(s) Inhalation every 6 hours  sulfaSALAzine 500 milliGRAM(s) Oral two times a day  tiotropium 18 MICROgram(s) Capsule 1 Capsule(s) Inhalation daily  traMADol 100 milliGRAM(s) Oral three times a day    MEDICATIONS  (PRN):  bisacodyl Suppository 10 milliGRAM(s) Rectal daily PRN Constipation  dextrose 40% Gel 15 Gram(s) Oral once PRN Blood Glucose LESS THAN 70 milliGRAM(s)/deciliter  Diclofenac Topical Gel 1% 1 Application(s) 1 Application(s) Topical two times a day PRN pain  glucagon  Injectable 1 milliGRAM(s) IntraMuscular once PRN Glucose LESS THAN 70 milligrams/deciliter  sodium chloride 0.65% Nasal 1 Spray(s) Both Nostrils four times a day PRN Nasal Congestion      LABS:                        10.9   11.35 )-----------( 272      ( 28 May 2018 05:56 )             34.7     05-28    139  |  96<L>  |  61<H>  ----------------------------<  166<H>  4.2   |  27  |  1.66<H>    Ca    9.5      28 May 2018 05:56  Phos  4.7     05-27  Mg     2.2     05-27          Arterial Blood Gas:  05-28 @ 05:20  7.37/52/96/28/97.8/4.7  ABG lactate: --        MICROBIOLOGY:     RADIOLOGY:  [ ] Reviewed and interpreted by me    PULMONARY FUNCTION TESTS:    EKG:    I&O's Summary

## 2018-05-28 NOTE — DISCHARGE NOTE ADULT - PATIENT PORTAL LINK FT
You can access the [facility] Patient Portal, offered by [facility], by registering with the following website: [email or web address]

## 2018-05-28 NOTE — DISCHARGE NOTE ADULT - DURABLE MEDICAL EQUIPMENT AGENCY
[facility] [phone]: A BIPAP Machine was ordered & will be delivered to your Home this evening. The Respiratory Therapist from [facility] will set up the Machine, & teach you how to use it.

## 2018-05-28 NOTE — DISCHARGE NOTE ADULT - MEDICATION SUMMARY - MEDICATIONS TO STOP TAKING
I will STOP taking the medications listed below when I get home from the hospital:    naproxen 250 mg oral tablet  -- 1 tab(s) by mouth 2 times a day, As needed, breakthrough pain    glimepiride 1 mg oral tablet  -- 1 tab(s) by mouth once a day

## 2018-05-28 NOTE — DISCHARGE NOTE ADULT - MEDICATION SUMMARY - MEDICATIONS TO TAKE
I will START or STAY ON the medications listed below when I get home from the hospital:    sulfaSALAzine 500 mg oral tablet  -- 1 tab(s) by mouth 2 times a day  -- Indication: For RA (rheumatoid arthritis)    predniSONE 10 mg oral tablet  -- 1 tab(s) by mouth once a day  -- Indication: For RA (rheumatoid arthritis)    acetaminophen 325 mg oral tablet  -- 2 tab(s) by mouth every 6 hours as needed  -- Indication: For mold pain    traMADol 50 mg oral tablet  -- 1.5 tab(s) by mouth 3 times a day as needed for pain  -- Indication: For Pain    gabapentin 300 mg oral capsule  -- 1 cap(s) by mouth 2 times a day  -- Indication: For Pain    Tresiba FlexTouch 100 units/mL subcutaneous solution  -- 18 unit(s) subcutaneous once a day (at bedtime)   -- Check with your doctor before becoming pregnant.  Do not drink alcoholic beverages when taking this medication.  It is very important that you take or use this exactly as directed.  Do not skip doses or discontinue unless directed by your doctor.  Keep in refrigerator.  Do not freeze.  May cause drowsiness.  Alcohol may intensify this effect.  Use care when operating dangerous machinery.  Obtain medical advice before taking any non-prescription drugs as some may affect the action of this medication.    -- Indication: For Diabetes mellitus due to therapeutic use of corticosteroid    SITagliptin 50 mg oral tablet  -- 1 tab(s) by mouth once a day   -- Do not drink alcoholic beverages when taking this medication.    -- Indication: For Diabetes mellitus due to therapeutic use of corticosteroid    loratadine 10 mg oral tablet  -- 1 tab(s) by mouth once a day  -- Indication: For Allergy    simvastatin 20 mg oral tablet  -- 1 tab(s) by mouth once a day (at bedtime)  -- Indication: For Hyperlipidemia    clopidogrel 75 mg oral tablet  -- 1 tab(s) by mouth once a day  -- Indication: For CAD prophylaxis    Toprol-XL 50 mg oral tablet, extended release  -- 1 tab(s) by mouth once a day  -- Indication: For Hypertension    alendronate  -- 1 tab(s) by mouth once a week  -- Indication: For Preventative    tiotropium 18 mcg inhalation capsule  -- 1 cap(s) inhaled once a day  -- Indication: For Chronic obstructive pulmonary disease with acute lower respiratory infection    albuterol 90 mcg/inh inhalation aerosol  -- 1 puff(s) inhaled every 4 hours as needed  -- Indication: For Chronic diastolic congestive heart failure    Advair Diskus 250 mcg-50 mcg inhalation powder  -- 1 puff(s) inhaled 2 times a day   -- Check with your doctor before becoming pregnant.  For inhalation only.  Obtain medical advice before taking any non-prescription drugs as some may affect the action of this medication.  Rinse mouth thoroughly after use.    -- Indication: For Chronic diastolic congestive heart failure    amLODIPine 5 mg oral tablet  -- 1 tab(s) by mouth once a day  -- Indication: For Hypertension    furosemide 40 mg oral tablet  -- 1 tab(s) by mouth once a day in the morning  -- Indication: For Chronic diastolic congestive heart failure    furosemide 20 mg oral tablet  -- 1 tab(s) by mouth once a day in the evening  -- Indication: For Chronic diastolic congestive heart failure    guaiFENesin 600 mg oral tablet, extended release  -- 1 tab(s) by mouth every 12 hours for 7 days  -- Indication: For Cough    senna oral tablet  -- 2 tab(s) by mouth once a day (at bedtime)  -- Indication: For Constipaiton    docusate sodium 100 mg oral capsule  -- 1 cap(s) by mouth 3 times a day  -- Indication: For Constipation    polyethylene glycol 3350 oral powder for reconstitution  -- 17 gram(s) by mouth once a day  -- Indication: For Constipation    fluticasone 50 mcg/inh nasal spray  -- 1 spray(s) into nose 2 times a day for 1 week  -- Indication: For Allergy, nasal congestion    sodium chloride 0.65% nasal spray  -- 1 spray(s) into nose 4 times a day for 1 week  -- Indication: For Allergy, nasal congestion    pantoprazole 40 mg oral delayed release tablet  -- 1 tab(s) by mouth once a day (before a meal)  -- Indication: For GERD    levothyroxine 125 mcg (0.125 mg) oral tablet  -- 1 tab(s) by mouth once a day  -- Indication: For Hypothyroid    oxybutynin 5 mg oral tablet  -- 1 tab(s) by mouth 2 times a day  -- Indication: For urine spasm

## 2018-05-28 NOTE — PROGRESS NOTE ADULT - NSHPATTENDINGPLANDISCUSS_GEN_ALL_CORE
PULM TEAM
patient, family
patient, rcu team
KEELEY Uribe
NP Ivanna
patient and RCU team

## 2018-05-28 NOTE — PROGRESS NOTE ADULT - ASSESSMENT
Assessment:  61 y/o F with COPD, pulmonary HTN, RA on prednisone, TIA, HTN p/w dyspnea fever and mucus production.      Problem/Plan - 1:  ·  Problem: Chronic obstructive pulmonary disease with acute lower respiratory infection.  Plan:   - with exacerbation  - c/w saline nebs   - completed course of abx, monitor off abx  - steroids to 10mg  - supplemental oxygen as needed  - Bipap order placed  - Repeat ABG in am     Problem/Plan - 2:  ·  Problem: Pulmonary hypertension.  Plan:   - cont lasix at home dose.      Problem/Plan - 3:  ·  Problem: Rheumatoid arthritis, involving unspecified site, unspecified rheumatoid factor presence.  Plan:   - on chronic steroids, 10mg at home (continue this dose)  - cont pain medications as needed  - pt has outpt rheumatologist who gives orencia (has been off for about 2 months 2/2 being on antibiotics)  - Naprosyn discontinued 2/2 SAM. Tylenol instead   - Continue with home dose of sulfasalazine     Problem/Plan - 4:  ·  Problem: Essential (primary) hypertension.  Plan:   - cont meds  - monitor B/P.     Problem/Plan - 5:  ·  Problem: Type 2 diabetes mellitus.  Plan:   - with a1c of 10.3  - pt is only on oral medications at home  - will start lantus and humalog premeals with moderate ISS  - endo consulted for medication regimen establishment as pt states she will have difficulty drawing insulin and injecting herself 2/2 RA (son is moving out of [place])   - endo continuing to adjust DM meds as needed  - DM nurse educated patient   - will need all insulin supplies and new glucometer for discharge. Will send suggested regimen to pharmacy today for pricing  - continue to monitor FSs.      Problem/Plan - 6:  Problem: Adult Hypothyroidism. Plan:   - TSH 33.82  - synthroid to 125mcg  - repeat TFTs in 4-6 weeks.     Problem/Plan - 7:  ·  Problem: Need for prophylactic measure.  Plan:   - heparin SQ.      Problem/Plan - 8:  ·  Problem: SAM (acute kidney injury).  Plan:   -Held naprosyn  -Re-dosed gabapentin for cr cl  -Monitor bun/cr, Cr improving. Continue to monitor     Problem/Plan - 9:  ·  Problem: Nasal congestion  -Fluticasone     -Changed Nasal ocean spray from PRN to standing       Problem/Plan - 10:  ·  Problem: Constipation  -Colace and senna added to existing regimen  -Encouraged continue use of prn suppository

## 2018-05-28 NOTE — DISCHARGE NOTE ADULT - ADDITIONAL INSTRUCTIONS
Follow up with cardiology, pulmonary, endocrine and your PCP- Follow up with cardiology, pulmonary, endocrine and your PCP- AS ABOVE Follow up with cardiology, pulmonary, endocrine and your PCP- AS ABOVE  **** you need to see your PCP this week for referrals for your appointments with pulmonary and endocrine***  ********** YOU HAVE AN APPOINTMENT WITH [facility] IN [place] OFFICE ON 6/4/18 - 9 AM *************

## 2018-05-28 NOTE — DISCHARGE NOTE ADULT - HOSPITAL COURSE
63 y/o F with COPD, pulmonary HTN, RA on prednisone, TIA, HTN p/w dyspnea, fever, and mucus production for the past 3 days  COPD  - will continue duoneb treatments  - Levaquin through 5/26 - 5 day course completed  - supplemental oxygen  - aerobika device  --overnight pulse ox - 34 minutes > 88%    CHF- continue home Lasix 40mg am 20mg pm. - continue prednisone   Rheumatoid arthritis - continue prednisone - continue naproxen and tramadol. TSH elevated to 33. - increased synthroid dose to 125mcg.  Will need outpt f/u in 6 weeks   - discontinued naprosyn on 5/26 2/2 SAM  - Sulfasalazine 500mg BID for RA (home med)     Hypothyroidism. Essential (primary) hypertension  Plan: TSH elevated to 33.   - increased synthroid dose to 112mcg.  Will need outpt f/u in 6 weeks. - continue metoprolol and amlodipine       HTN  - continue metoprolol and amlodipine. - continue plavix      SAM  -DC'ed naprosyn  -Adjusted dose of gabapentin for cr clearance    DM  -Endo consulted, recs implemented  -Seen by DM nurse for education 61 y/o F with COPD, pulmonary HTN, RA on prednisone, TIA, HTN p/w dyspnea, fever, and mucus production for the past 3 days  COPD  - will continue duoneb treatments  - Levaquin through 5/26 - 5 day course completed  - supplemental oxygen  - aerobika device  --overnight pulse ox - 34 minutes > 88% - qualified for home bipap    CHF- continue home Lasix 40mg am 20mg pm. - continue prednisone   Rheumatoid arthritis - continue prednisone - continue naproxen and tramadol. TSH elevated to 33. - increased synthroid dose to 125mcg.  Will need outpt f/u in 6 weeks   - discontinued naprosyn on 5/26 2/2 SAM  - Sulfasalazine 500mg BID for RA (home med)     Hypothyroidism. Essential (primary) hypertension  Plan: TSH elevated to 33. - increased synthroid dose to 112mcg.  Will need outpt f/u in 6 weeks. - continue metoprolol and amlodipine       HTN- continue metoprolol and amlodipine. - continue plavix      SAM-DC'ed naprosyn-Adjusted dose of gabapentin for cr clearance    DM-Endo consulted, recs implemented-Seen by DM nurse for education    dispo: home with home care and A

## 2018-05-28 NOTE — DISCHARGE NOTE ADULT - PROVIDER TOKENS
TOKEN:'[id number]',[id number]' TOKEN:'[id number]',TOKEN:'[id number]',TOKEN:'[id number]' TOKEN:'[id number]',TOKEN:'[id number]'

## 2018-05-28 NOTE — DISCHARGE NOTE ADULT - HOME CARE AGENCY
[facility] Care [phone] for Visiting Nurse Services. The 1st Visiting Nurse Visit is for Thursday 5/31/18.The Nurse will call to arrange the Visit time. [facility] [place] Care [phone] for Visiting Nurse Services. The 1st Visiting Nurse Visit is for Friday 6/01/18.The Nurse will call to arrange the Visit time.

## 2018-05-29 LAB
BASE EXCESS BLDV CALC-SCNC: 6.8 MMOL/L — SIGNIFICANT CHANGE UP
BUN SERPL-MCNC: 53 MG/DL — HIGH (ref 7–23)
CALCIUM SERPL-MCNC: 9.9 MG/DL — SIGNIFICANT CHANGE UP (ref 8.4–10.5)
CHLORIDE SERPL-SCNC: 97 MMOL/L — LOW (ref 98–107)
CO2 SERPL-SCNC: 25 MMOL/L — SIGNIFICANT CHANGE UP (ref 22–31)
CREAT SERPL-MCNC: 1.5 MG/DL — HIGH (ref 0.5–1.3)
GLUCOSE BLDC GLUCOMTR-MCNC: 108 MG/DL — HIGH (ref 70–99)
GLUCOSE BLDC GLUCOMTR-MCNC: 110 MG/DL — HIGH (ref 70–99)
GLUCOSE BLDC GLUCOMTR-MCNC: 234 MG/DL — HIGH (ref 70–99)
GLUCOSE BLDC GLUCOMTR-MCNC: 293 MG/DL — HIGH (ref 70–99)
GLUCOSE SERPL-MCNC: 215 MG/DL — HIGH (ref 70–99)
HCO3 BLDV-SCNC: 29 MMOL/L — HIGH (ref 20–27)
PCO2 BLDV: 56 MMHG — HIGH (ref 41–51)
PH BLDV: 7.38 PH — SIGNIFICANT CHANGE UP (ref 7.32–7.43)
PO2 BLDV: 51 MMHG — HIGH (ref 35–40)
POTASSIUM SERPL-MCNC: 4 MMOL/L — SIGNIFICANT CHANGE UP (ref 3.5–5.3)
POTASSIUM SERPL-SCNC: 4 MMOL/L — SIGNIFICANT CHANGE UP (ref 3.5–5.3)
SAO2 % BLDV: 83.7 % — SIGNIFICANT CHANGE UP (ref 60–85)
SODIUM SERPL-SCNC: 139 MMOL/L — SIGNIFICANT CHANGE UP (ref 135–145)

## 2018-05-29 PROCEDURE — 99233 SBSQ HOSP IP/OBS HIGH 50: CPT | Mod: GC

## 2018-05-29 PROCEDURE — 99232 SBSQ HOSP IP/OBS MODERATE 35: CPT

## 2018-05-29 RX ORDER — SIMETHICONE 80 MG/1
80 TABLET, CHEWABLE ORAL EVERY 8 HOURS
Qty: 0 | Refills: 0 | Status: DISCONTINUED | OUTPATIENT
Start: 2018-05-29 | End: 2018-05-31

## 2018-05-29 RX ADMIN — Medication 100 MILLIGRAM(S): at 15:59

## 2018-05-29 RX ADMIN — Medication 650 MILLIGRAM(S): at 00:04

## 2018-05-29 RX ADMIN — BUDESONIDE AND FORMOTEROL FUMARATE DIHYDRATE 2 PUFF(S): 160; 4.5 AEROSOL RESPIRATORY (INHALATION) at 10:35

## 2018-05-29 RX ADMIN — SODIUM CHLORIDE 3 MILLILITER(S): 9 INJECTION INTRAMUSCULAR; INTRAVENOUS; SUBCUTANEOUS at 10:35

## 2018-05-29 RX ADMIN — Medication 20 MILLIGRAM(S): at 22:10

## 2018-05-29 RX ADMIN — Medication 10 MILLIGRAM(S): at 05:29

## 2018-05-29 RX ADMIN — Medication 125 MICROGRAM(S): at 05:29

## 2018-05-29 RX ADMIN — Medication 1 SPRAY(S): at 05:27

## 2018-05-29 RX ADMIN — SIMETHICONE 80 MILLIGRAM(S): 80 TABLET, CHEWABLE ORAL at 18:31

## 2018-05-29 RX ADMIN — SIMVASTATIN 20 MILLIGRAM(S): 20 TABLET, FILM COATED ORAL at 22:10

## 2018-05-29 RX ADMIN — Medication 650 MILLIGRAM(S): at 17:56

## 2018-05-29 RX ADMIN — Medication 600 MILLIGRAM(S): at 17:54

## 2018-05-29 RX ADMIN — Medication 1 SPRAY(S): at 17:54

## 2018-05-29 RX ADMIN — TRAMADOL HYDROCHLORIDE 100 MILLIGRAM(S): 50 TABLET ORAL at 22:10

## 2018-05-29 RX ADMIN — Medication 5 MILLIGRAM(S): at 17:56

## 2018-05-29 RX ADMIN — CHLORHEXIDINE GLUCONATE 1 APPLICATION(S): 213 SOLUTION TOPICAL at 11:09

## 2018-05-29 RX ADMIN — Medication 600 MILLIGRAM(S): at 05:29

## 2018-05-29 RX ADMIN — POLYETHYLENE GLYCOL 3350 17 GRAM(S): 17 POWDER, FOR SOLUTION ORAL at 15:59

## 2018-05-29 RX ADMIN — KETOTIFEN FUMARATE 1 DROP(S): 0.34 SOLUTION OPHTHALMIC at 17:55

## 2018-05-29 RX ADMIN — Medication 100 MILLIGRAM(S): at 05:35

## 2018-05-29 RX ADMIN — Medication 6: at 13:13

## 2018-05-29 RX ADMIN — TRAMADOL HYDROCHLORIDE 100 MILLIGRAM(S): 50 TABLET ORAL at 05:28

## 2018-05-29 RX ADMIN — Medication 650 MILLIGRAM(S): at 11:08

## 2018-05-29 RX ADMIN — KETOTIFEN FUMARATE 1 DROP(S): 0.34 SOLUTION OPHTHALMIC at 05:27

## 2018-05-29 RX ADMIN — Medication 650 MILLIGRAM(S): at 17:53

## 2018-05-29 RX ADMIN — LORATADINE 10 MILLIGRAM(S): 10 TABLET ORAL at 16:00

## 2018-05-29 RX ADMIN — Medication 1 SPRAY(S): at 14:02

## 2018-05-29 RX ADMIN — Medication 40 MILLIGRAM(S): at 05:29

## 2018-05-29 RX ADMIN — Medication 1 SPRAY(S): at 17:55

## 2018-05-29 RX ADMIN — TRAMADOL HYDROCHLORIDE 100 MILLIGRAM(S): 50 TABLET ORAL at 16:05

## 2018-05-29 RX ADMIN — Medication 500 MILLIGRAM(S): at 05:28

## 2018-05-29 RX ADMIN — BUDESONIDE AND FORMOTEROL FUMARATE DIHYDRATE 2 PUFF(S): 160; 4.5 AEROSOL RESPIRATORY (INHALATION) at 22:24

## 2018-05-29 RX ADMIN — Medication 650 MILLIGRAM(S): at 05:28

## 2018-05-29 RX ADMIN — GABAPENTIN 300 MILLIGRAM(S): 400 CAPSULE ORAL at 05:28

## 2018-05-29 RX ADMIN — TRAMADOL HYDROCHLORIDE 100 MILLIGRAM(S): 50 TABLET ORAL at 16:35

## 2018-05-29 RX ADMIN — Medication 5 MILLIGRAM(S): at 05:28

## 2018-05-29 RX ADMIN — PANTOPRAZOLE SODIUM 40 MILLIGRAM(S): 20 TABLET, DELAYED RELEASE ORAL at 05:29

## 2018-05-29 RX ADMIN — AMLODIPINE BESYLATE 5 MILLIGRAM(S): 2.5 TABLET ORAL at 05:29

## 2018-05-29 RX ADMIN — Medication 650 MILLIGRAM(S): at 11:38

## 2018-05-29 RX ADMIN — CLOPIDOGREL BISULFATE 75 MILLIGRAM(S): 75 TABLET, FILM COATED ORAL at 11:08

## 2018-05-29 RX ADMIN — Medication 8 UNIT(S): at 08:50

## 2018-05-29 RX ADMIN — GABAPENTIN 300 MILLIGRAM(S): 400 CAPSULE ORAL at 17:55

## 2018-05-29 RX ADMIN — INSULIN GLARGINE 18 UNIT(S): 100 INJECTION, SOLUTION SUBCUTANEOUS at 22:36

## 2018-05-29 RX ADMIN — TRAMADOL HYDROCHLORIDE 100 MILLIGRAM(S): 50 TABLET ORAL at 05:58

## 2018-05-29 RX ADMIN — Medication 4: at 08:50

## 2018-05-29 RX ADMIN — Medication 500 MILLIGRAM(S): at 17:54

## 2018-05-29 RX ADMIN — TRAMADOL HYDROCHLORIDE 100 MILLIGRAM(S): 50 TABLET ORAL at 22:40

## 2018-05-29 RX ADMIN — Medication 650 MILLIGRAM(S): at 05:58

## 2018-05-29 RX ADMIN — Medication 50 MILLIGRAM(S): at 05:28

## 2018-05-29 RX ADMIN — Medication 8 UNIT(S): at 13:13

## 2018-05-29 RX ADMIN — Medication 8 UNIT(S): at 18:33

## 2018-05-29 NOTE — PROGRESS NOTE ADULT - SUBJECTIVE AND OBJECTIVE BOX
CHIEF COMPLAINT:    Interval Events:      OBJECTIVE:  ICU Vital Signs Last 24 Hrs  T(C): 37 (29 May 2018 05:21), Max: 37 (29 May 2018 05:21)  T(F): 98.6 (29 May 2018 05:21), Max: 98.6 (29 May 2018 05:21)  HR: 85 (29 May 2018 06:42) (78 - 93)  BP: 127/73 (29 May 2018 05:21) (110/58 - 127/73)  BP(mean): --  ABP: --  ABP(mean): --  RR: 18 (29 May 2018 05:21) (18 - 18)  SpO2: 98% (29 May 2018 06:42) (95% - 100%)        CAPILLARY BLOOD GLUCOSE      POCT Blood Glucose.: 114 mg/dL (28 May 2018 21:33)        HOSPITAL MEDICATIONS:  MEDICATIONS  (STANDING):  acetaminophen   Tablet. 650 milliGRAM(s) Oral every 6 hours  ALBUTerol    90 MICROgram(s) HFA Inhaler 1 Puff(s) Inhalation every 4 hours  amLODIPine   Tablet 5 milliGRAM(s) Oral daily  buDESOnide 160 MICROgram(s)/formoterol 4.5 MICROgram(s) Inhaler 2 Puff(s) Inhalation two times a day  chlorhexidine 4% Liquid 1 Application(s) Topical <User Schedule>  clopidogrel Tablet 75 milliGRAM(s) Oral daily  dextrose 5%. 1000 milliLiter(s) (50 mL/Hr) IV Continuous <Continuous>  dextrose 50% Injectable 12.5 Gram(s) IV Push once  dextrose 50% Injectable 25 Gram(s) IV Push once  dextrose 50% Injectable 25 Gram(s) IV Push once  docusate sodium 100 milliGRAM(s) Oral three times a day  fluticasone propionate 50 MICROgram(s)/spray Nasal Spray 1 Spray(s) Both Nostrils two times a day  furosemide    Tablet 20 milliGRAM(s) Oral at bedtime  furosemide    Tablet 40 milliGRAM(s) Oral daily  gabapentin 300 milliGRAM(s) Oral two times a day  guaiFENesin  milliGRAM(s) Oral every 12 hours  heparin  Injectable 7500 Unit(s) SubCutaneous every 8 hours  insulin glargine Injectable (LANTUS) 18 Unit(s) SubCutaneous at bedtime  insulin lispro (HumaLOG) corrective regimen sliding scale   SubCutaneous three times a day before meals  insulin lispro (HumaLOG) corrective regimen sliding scale   SubCutaneous at bedtime  insulin lispro Injectable (HumaLOG) 8 Unit(s) SubCutaneous before lunch  insulin lispro Injectable (HumaLOG) 8 Unit(s) SubCutaneous with dinner  insulin lispro Injectable (HumaLOG) 8 Unit(s) SubCutaneous before breakfast  ketotifen 0.025% Ophthalmic Solution 1 Drop(s) Both EYES two times a day  levothyroxine 125 MICROGram(s) Oral daily  loratadine 10 milliGRAM(s) Oral daily  metoprolol succinate ER 50 milliGRAM(s) Oral daily  oxybutynin 5 milliGRAM(s) Oral two times a day  pantoprazole    Tablet 40 milliGRAM(s) Oral before breakfast  polyethylene glycol 3350 17 Gram(s) Oral daily  predniSONE   Tablet 10 milliGRAM(s) Oral daily  senna 2 Tablet(s) Oral at bedtime  simvastatin 20 milliGRAM(s) Oral at bedtime  sodium chloride 0.65% Nasal 1 Spray(s) Both Nostrils four times a day  sodium chloride 3%  Inhalation 3 milliLiter(s) Inhalation every 6 hours  sulfaSALAzine 500 milliGRAM(s) Oral two times a day  tiotropium 18 MICROgram(s) Capsule 1 Capsule(s) Inhalation daily  traMADol 100 milliGRAM(s) Oral three times a day    MEDICATIONS  (PRN):  bisacodyl Suppository 10 milliGRAM(s) Rectal daily PRN Constipation  dextrose 40% Gel 15 Gram(s) Oral once PRN Blood Glucose LESS THAN 70 milliGRAM(s)/deciliter  Diclofenac Topical Gel 1% 1 Application(s) 1 Application(s) Topical two times a day PRN pain  glucagon  Injectable 1 milliGRAM(s) IntraMuscular once PRN Glucose LESS THAN 70 milligrams/deciliter      LABS:                        10.9   11.35 )-----------( 272      ( 28 May 2018 05:56 )             34.7     05-29    139  |  97<L>  |  53<H>  ----------------------------<  215<H>  4.0   |  25  |  1.50<H>    Ca    9.9      29 May 2018 06:00  Phos  4.7     05-27  Mg     2.2     05-27          Arterial Blood Gas:  05-28 @ 05:20  7.37/52/96/28/97.8/4.7  ABG lactate: --    Venous Blood Gas:  05-29 @ 06:00  7.38/56/51/29/83.7  VBG Lactate: --      MICROBIOLOGY:     RADIOLOGY:  [ ] Reviewed and interpreted by me    PULMONARY FUNCTION TESTS:    EKG: CHIEF COMPLAINT: Patient is a 62y old  Female who presents with a chief complaint of dyspnea, fever (28 May 2018 12:19)      Interval Events: no acute events, pt c/o constipation    OBJECTIVE:  ICU Vital Signs Last 24 Hrs  T(C): 37 (29 May 2018 05:21), Max: 37 (29 May 2018 05:21)  T(F): 98.6 (29 May 2018 05:21), Max: 98.6 (29 May 2018 05:21)  HR: 85 (29 May 2018 06:42) (78 - 93)  BP: 127/73 (29 May 2018 05:21) (110/58 - 127/73)  BP(mean): --  ABP: --  ABP(mean): --  RR: 18 (29 May 2018 05:21) (18 - 18)  SpO2: 98% (29 May 2018 06:42) (95% - 100%)        CAPILLARY BLOOD GLUCOSE      POCT Blood Glucose.: 114 mg/dL (28 May 2018 21:33)        HOSPITAL MEDICATIONS:  MEDICATIONS  (STANDING):  acetaminophen   Tablet. 650 milliGRAM(s) Oral every 6 hours  ALBUTerol    90 MICROgram(s) HFA Inhaler 1 Puff(s) Inhalation every 4 hours  amLODIPine   Tablet 5 milliGRAM(s) Oral daily  buDESOnide 160 MICROgram(s)/formoterol 4.5 MICROgram(s) Inhaler 2 Puff(s) Inhalation two times a day  chlorhexidine 4% Liquid 1 Application(s) Topical <User Schedule>  clopidogrel Tablet 75 milliGRAM(s) Oral daily  dextrose 5%. 1000 milliLiter(s) (50 mL/Hr) IV Continuous <Continuous>  dextrose 50% Injectable 12.5 Gram(s) IV Push once  dextrose 50% Injectable 25 Gram(s) IV Push once  dextrose 50% Injectable 25 Gram(s) IV Push once  docusate sodium 100 milliGRAM(s) Oral three times a day  fluticasone propionate 50 MICROgram(s)/spray Nasal Spray 1 Spray(s) Both Nostrils two times a day  furosemide    Tablet 20 milliGRAM(s) Oral at bedtime  furosemide    Tablet 40 milliGRAM(s) Oral daily  gabapentin 300 milliGRAM(s) Oral two times a day  guaiFENesin  milliGRAM(s) Oral every 12 hours  heparin  Injectable 7500 Unit(s) SubCutaneous every 8 hours  insulin glargine Injectable (LANTUS) 18 Unit(s) SubCutaneous at bedtime  insulin lispro (HumaLOG) corrective regimen sliding scale   SubCutaneous three times a day before meals  insulin lispro (HumaLOG) corrective regimen sliding scale   SubCutaneous at bedtime  insulin lispro Injectable (HumaLOG) 8 Unit(s) SubCutaneous before lunch  insulin lispro Injectable (HumaLOG) 8 Unit(s) SubCutaneous with dinner  insulin lispro Injectable (HumaLOG) 8 Unit(s) SubCutaneous before breakfast  ketotifen 0.025% Ophthalmic Solution 1 Drop(s) Both EYES two times a day  levothyroxine 125 MICROGram(s) Oral daily  loratadine 10 milliGRAM(s) Oral daily  metoprolol succinate ER 50 milliGRAM(s) Oral daily  oxybutynin 5 milliGRAM(s) Oral two times a day  pantoprazole    Tablet 40 milliGRAM(s) Oral before breakfast  polyethylene glycol 3350 17 Gram(s) Oral daily  predniSONE   Tablet 10 milliGRAM(s) Oral daily  senna 2 Tablet(s) Oral at bedtime  simvastatin 20 milliGRAM(s) Oral at bedtime  sodium chloride 0.65% Nasal 1 Spray(s) Both Nostrils four times a day  sodium chloride 3%  Inhalation 3 milliLiter(s) Inhalation every 6 hours  sulfaSALAzine 500 milliGRAM(s) Oral two times a day  tiotropium 18 MICROgram(s) Capsule 1 Capsule(s) Inhalation daily  traMADol 100 milliGRAM(s) Oral three times a day    MEDICATIONS  (PRN):  bisacodyl Suppository 10 milliGRAM(s) Rectal daily PRN Constipation  dextrose 40% Gel 15 Gram(s) Oral once PRN Blood Glucose LESS THAN 70 milliGRAM(s)/deciliter  Diclofenac Topical Gel 1% 1 Application(s) 1 Application(s) Topical two times a day PRN pain  glucagon  Injectable 1 milliGRAM(s) IntraMuscular once PRN Glucose LESS THAN 70 milligrams/deciliter      LABS:                        10.9   11.35 )-----------( 272      ( 28 May 2018 05:56 )             34.7     05-29    139  |  97<L>  |  53<H>  ----------------------------<  215<H>  4.0   |  25  |  1.50<H>    Ca    9.9      29 May 2018 06:00  Phos  4.7     05-27  Mg     2.2     05-27          Arterial Blood Gas:  05-28 @ 05:20  7.37/52/96/28/97.8/4.7  ABG lactate: --    Venous Blood Gas:  05-29 @ 06:00  7.38/56/51/29/83.7  VBG Lactate: --      MICROBIOLOGY:     RADIOLOGY:  [ ] Reviewed and interpreted by me    PULMONARY FUNCTION TESTS:    EKG:

## 2018-05-29 NOTE — PROGRESS NOTE ADULT - SUBJECTIVE AND OBJECTIVE BOX
Chief Complaint: Steroid induced DM    History: Tolerating po, no hypoglycemia. Patient noted that when she follows stricter diet her glucose is better controlled. Prednisone lowered to 10mg.    MEDICATIONS  (STANDING):  acetaminophen   Tablet. 650 milliGRAM(s) Oral every 6 hours  ALBUTerol    90 MICROgram(s) HFA Inhaler 1 Puff(s) Inhalation every 4 hours  amLODIPine   Tablet 5 milliGRAM(s) Oral daily  buDESOnide 160 MICROgram(s)/formoterol 4.5 MICROgram(s) Inhaler 2 Puff(s) Inhalation two times a day  chlorhexidine 4% Liquid 1 Application(s) Topical <User Schedule>  clopidogrel Tablet 75 milliGRAM(s) Oral daily  dextrose 5%. 1000 milliLiter(s) (50 mL/Hr) IV Continuous <Continuous>  dextrose 50% Injectable 12.5 Gram(s) IV Push once  dextrose 50% Injectable 25 Gram(s) IV Push once  dextrose 50% Injectable 25 Gram(s) IV Push once  docusate sodium 100 milliGRAM(s) Oral three times a day  fluticasone propionate 50 MICROgram(s)/spray Nasal Spray 1 Spray(s) Both Nostrils two times a day  furosemide    Tablet 20 milliGRAM(s) Oral at bedtime  furosemide    Tablet 40 milliGRAM(s) Oral daily  gabapentin 300 milliGRAM(s) Oral two times a day  guaiFENesin  milliGRAM(s) Oral every 12 hours  heparin  Injectable 7500 Unit(s) SubCutaneous every 8 hours  insulin glargine Injectable (LANTUS) 18 Unit(s) SubCutaneous at bedtime  insulin lispro (HumaLOG) corrective regimen sliding scale   SubCutaneous three times a day before meals  insulin lispro (HumaLOG) corrective regimen sliding scale   SubCutaneous at bedtime  insulin lispro Injectable (HumaLOG) 8 Unit(s) SubCutaneous before lunch  insulin lispro Injectable (HumaLOG) 8 Unit(s) SubCutaneous with dinner  insulin lispro Injectable (HumaLOG) 8 Unit(s) SubCutaneous before breakfast  ketotifen 0.025% Ophthalmic Solution 1 Drop(s) Both EYES two times a day  levothyroxine 125 MICROGram(s) Oral daily  loratadine 10 milliGRAM(s) Oral daily  metoprolol succinate ER 50 milliGRAM(s) Oral daily  oxybutynin 5 milliGRAM(s) Oral two times a day  pantoprazole    Tablet 40 milliGRAM(s) Oral before breakfast  polyethylene glycol 3350 17 Gram(s) Oral daily  predniSONE   Tablet 10 milliGRAM(s) Oral daily  senna 2 Tablet(s) Oral at bedtime  simvastatin 20 milliGRAM(s) Oral at bedtime  sodium chloride 0.65% Nasal 1 Spray(s) Both Nostrils four times a day  sodium chloride 3%  Inhalation 3 milliLiter(s) Inhalation every 6 hours  sulfaSALAzine 500 milliGRAM(s) Oral two times a day  tiotropium 18 MICROgram(s) Capsule 1 Capsule(s) Inhalation daily  traMADol 100 milliGRAM(s) Oral three times a day    MEDICATIONS  (PRN):  bisacodyl Suppository 10 milliGRAM(s) Rectal daily PRN Constipation  dextrose 40% Gel 15 Gram(s) Oral once PRN Blood Glucose LESS THAN 70 milliGRAM(s)/deciliter  Diclofenac Topical Gel 1% 1 Application(s) 1 Application(s) Topical two times a day PRN pain  glucagon  Injectable 1 milliGRAM(s) IntraMuscular once PRN Glucose LESS THAN 70 milligrams/deciliter  simethicone 80 milliGRAM(s) Chew every 8 hours PRN Gas      Allergies    No Known Allergies    Intolerances      Review of Systems:    ALL OTHER SYSTEMS REVIEWED AND NEGATIVE    UNABLE TO OBTAIN    PHYSICAL EXAM:  VITALS: T(C): 36.9 (05-29-18 @ 14:20)  T(F): 98.5 (05-29-18 @ 14:20), Max: 98.6 (05-29-18 @ 05:21)  HR: 77 (05-29-18 @ 14:20) (77 - 93)  BP: 121/72 (05-29-18 @ 14:20) (113/64 - 127/73)  RR:  (18 - 19)  SpO2:  (96% - 100%)  Wt(kg): --  GENERAL: NAD, well-groomed, well-developed  EYES: No proptosis, no lid lag, anicteric  HEENT:  Atraumatic, Normocephalic, moist mucous membranes  Musculoskeletal: hand deformities b/l  PSYCH: Alert and oriented x 3, normal affect, normal mood      CAPILLARY BLOOD GLUCOSE      POCT Blood Glucose.: 108 mg/dL (29 May 2018 18:06)  POCT Blood Glucose.: 293 mg/dL (29 May 2018 12:53)  POCT Blood Glucose.: 234 mg/dL (29 May 2018 08:39)  POCT Blood Glucose.: 114 mg/dL (28 May 2018 21:33)      05-29    139  |  97<L>  |  53<H>  ----------------------------<  215<H>  4.0   |  25  |  1.50<H>    EGFR if : 43  EGFR if non : 37    Ca    9.9      05-29  Mg     2.2     05-27  Phos  4.7     05-27            Thyroid Function Tests:  05-22 @ 20:08 TSH 33.82 FreeT4 -- T3 -- Anti TPO -- Anti Thyroglobulin Ab -- TSI --  05-22 @ 18:00 TSH 31.16 FreeT4 -- T3 -- Anti TPO -- Anti Thyroglobulin Ab -- TSI --      Hemoglobin A1C, Whole Blood: 10.3 % <H> [4.0 - 5.6] (05-24-18 @ 07:00)

## 2018-05-29 NOTE — PROGRESS NOTE ADULT - ASSESSMENT
Assessment:  61 y/o F with COPD, pulmonary HTN, RA on prednisone, TIA, HTN p/w dyspnea fever and mucus production.      Problem/Plan - 1:  ·  Problem: Chronic obstructive pulmonary disease with acute lower respiratory infection.  Plan:   - with exacerbation  - c/w saline nebs   - completed course of abx, monitor off abx  - steroids to 10mg  - supplemental oxygen as needed  - Bipap order placed  - Repeat ABG in am     Problem/Plan - 2:  ·  Problem: Pulmonary hypertension.  Plan:   - cont lasix at home dose.      Problem/Plan - 3:  ·  Problem: Rheumatoid arthritis, involving unspecified site, unspecified rheumatoid factor presence.  Plan:   - on chronic steroids, 10mg at home (continue this dose)  - cont pain medications as needed  - pt has outpt rheumatologist who gives orencia (has been off for about 2 months 2/2 being on antibiotics)  - Naprosyn discontinued 2/2 SAM. Tylenol instead   - Continue with home dose of sulfasalazine     Problem/Plan - 4:  ·  Problem: Essential (primary) hypertension.  Plan:   - cont meds  - monitor B/P.     Problem/Plan - 5:  ·  Problem: Type 2 diabetes mellitus.  Plan:   - with a1c of 10.3  - pt is only on oral medications at home  - will start lantus and humalog premeals with moderate ISS  - endo consulted for medication regimen establishment as pt states she will have difficulty drawing insulin and injecting herself 2/2 RA (son is moving out of state)   - endo continuing to adjust DM meds as needed  - DM nurse educated patient   - will need all insulin supplies and new glucometer for discharge. Will send suggested regimen to pharmacy today for pricing  - continue to monitor FSs.      Problem/Plan - 6:  Problem: Adult Hypothyroidism. Plan:   - TSH 33.82  - synthroid to 125mcg  - repeat TFTs in 4-6 weeks.     Problem/Plan - 7:  ·  Problem: Need for prophylactic measure.  Plan:   - heparin SQ.      Problem/Plan - 8:  ·  Problem: SAM (acute kidney injury).  Plan:   -Held naprosyn  -Re-dosed gabapentin for cr cl  -Monitor bun/cr, Cr improving. Continue to monitor     Problem/Plan - 9:  ·  Problem: Nasal congestion  -Fluticasone     -Changed Nasal ocean spray from PRN to standing       Problem/Plan - 10:  ·  Problem: Constipation  -Colace and senna added to existing regimen  -Encouraged continue use of prn suppository Assessment:  63 y/o F with COPD, pulmonary HTN, RA on prednisone, TIA, HTN p/w dyspnea fever and mucus production.      Problem/Plan - 1:  ·  Problem: Chronic obstructive pulmonary disease with acute lower respiratory infection.  Plan:   - with exacerbation  - c/w saline nebs   - completed course of abx, monitor off abx  - steroids to 10mg daily - chronic steroid dose  - supplemental oxygen as needed  - Bipap order placed  -overnight pulse oximetry reviewed - bipap for home upon d/c     Problem/Plan - 2:  ·  Problem: Pulmonary hypertension.  Plan:   - cont lasix at home dose.      Problem/Plan - 3:  ·  Problem: Rheumatoid arthritis, involving unspecified site, unspecified rheumatoid factor presence.  Plan:   - on chronic steroids, 10mg at home (continue this dose)  - cont pain medications as needed  - pt has outpt rheumatologist who gives orencia (has been off for about 2 months 2/2 being on antibiotics)  - Naprosyn discontinued 2/2 SAM. Tylenol instead   - Continue with home dose of sulfasalazine     Problem/Plan - 4:  ·  Problem: Essential (primary) hypertension.  Plan:   - cont meds  - monitor B/P.     Problem/Plan - 5:  ·  Problem: Type 2 diabetes mellitus.  Plan:   - with a1c of 10.3  - pt is only on oral medications at home  - will start lantus and humalog premeals with moderate ISS  - endo consulted for medication regimen establishment as pt states she will have difficulty drawing insulin and injecting herself 2/2 RA (son is moving out of state)   - endo continuing to adjust DM meds as needed  - DM nurse educated patient   - will need all insulin supplies and new glucometer for discharge. Will send suggested regimen to pharmacy today for pricing  - continue to monitor FSs.      Problem/Plan - 6:  Problem: Adult Hypothyroidism. Plan:   - TSH 33.82  - synthroid to 125mcg  - repeat TFTs in 4-6 weeks.     Problem/Plan - 7:  ·  Problem: Need for prophylactic measure.  Plan:   - heparin SQ.      Problem/Plan - 8:  ·  Problem: SAM (acute kidney injury).  Plan:   -Held naprosyn  -Re-dosed gabapentin for cr cl  -Monitor bun/cr, Cr improving. Continue to monitor     Problem/Plan - 9:  ·  Problem: Nasal congestion  -Fluticasone     -Changed Nasal ocean spray from PRN to standing       Problem/Plan - 10:  ·  Problem: Constipation  -Colace and senna added to existing regimen  -Encouraged continue use of prn suppository    dispo: PT recommend rehab, patient refused requesting to be discharged home.  CM aware and working on home equipment and home needs

## 2018-05-29 NOTE — PROGRESS NOTE ADULT - ATTENDING COMMENTS
acute on chronic resp failure with hypoxia and hypercapnea, copd exac  completed a course of abx, back on chronic dose of pred 10 (for RA)  back on lasix, 40 daily  diabetes, insulin chinaiinusrat johnson has home O2, now planned for home bipap as well  d/c plan for tomorrow

## 2018-05-30 LAB
GLUCOSE BLDC GLUCOMTR-MCNC: 111 MG/DL — HIGH (ref 70–99)
GLUCOSE BLDC GLUCOMTR-MCNC: 183 MG/DL — HIGH (ref 70–99)
GLUCOSE BLDC GLUCOMTR-MCNC: 207 MG/DL — HIGH (ref 70–99)
GLUCOSE BLDC GLUCOMTR-MCNC: 289 MG/DL — HIGH (ref 70–99)

## 2018-05-30 PROCEDURE — 99238 HOSP IP/OBS DSCHRG MGMT 30/<: CPT

## 2018-05-30 PROCEDURE — 99222 1ST HOSP IP/OBS MODERATE 55: CPT

## 2018-05-30 RX ORDER — SENNA PLUS 8.6 MG/1
2 TABLET ORAL
Qty: 0 | Refills: 0 | COMMUNITY
Start: 2018-05-30

## 2018-05-30 RX ORDER — SULFASALAZINE 500 MG
1 TABLET ORAL
Qty: 0 | Refills: 0 | COMMUNITY
Start: 2018-05-30

## 2018-05-30 RX ORDER — GABAPENTIN 400 MG/1
1 CAPSULE ORAL
Qty: 0 | Refills: 0 | COMMUNITY
Start: 2018-05-30

## 2018-05-30 RX ORDER — FLUTICASONE PROPIONATE AND SALMETEROL 50; 250 UG/1; UG/1
1 POWDER ORAL; RESPIRATORY (INHALATION)
Qty: 1 | Refills: 0
Start: 2018-05-30

## 2018-05-30 RX ORDER — FLUTICASONE PROPIONATE 50 MCG
1 SPRAY, SUSPENSION NASAL
Qty: 0 | Refills: 0 | COMMUNITY
Start: 2018-05-30

## 2018-05-30 RX ORDER — POLYETHYLENE GLYCOL 3350 17 G/17G
17 POWDER, FOR SOLUTION ORAL
Qty: 0 | Refills: 0 | COMMUNITY
Start: 2018-05-30

## 2018-05-30 RX ORDER — DOCUSATE SODIUM 100 MG
1 CAPSULE ORAL
Qty: 0 | Refills: 0 | COMMUNITY
Start: 2018-05-30

## 2018-05-30 RX ORDER — SODIUM CHLORIDE 0.65 %
1 AEROSOL, SPRAY (ML) NASAL
Qty: 0 | Refills: 0 | COMMUNITY
Start: 2018-05-30

## 2018-05-30 RX ORDER — GLIMEPIRIDE 1 MG
1 TABLET ORAL
Qty: 0 | Refills: 0 | COMMUNITY

## 2018-05-30 RX ORDER — ACETAMINOPHEN 500 MG
2 TABLET ORAL
Qty: 0 | Refills: 0 | COMMUNITY
Start: 2018-05-30

## 2018-05-30 RX ORDER — LEVOTHYROXINE SODIUM 125 MCG
1 TABLET ORAL
Qty: 30 | Refills: 0 | OUTPATIENT
Start: 2018-05-30 | End: 2018-06-28

## 2018-05-30 RX ADMIN — Medication 2: at 18:05

## 2018-05-30 RX ADMIN — Medication 1 SPRAY(S): at 17:56

## 2018-05-30 RX ADMIN — SIMVASTATIN 20 MILLIGRAM(S): 20 TABLET, FILM COATED ORAL at 22:13

## 2018-05-30 RX ADMIN — TRAMADOL HYDROCHLORIDE 100 MILLIGRAM(S): 50 TABLET ORAL at 16:27

## 2018-05-30 RX ADMIN — Medication 5 MILLIGRAM(S): at 06:50

## 2018-05-30 RX ADMIN — Medication 600 MILLIGRAM(S): at 06:45

## 2018-05-30 RX ADMIN — Medication 650 MILLIGRAM(S): at 12:36

## 2018-05-30 RX ADMIN — Medication 1 SPRAY(S): at 12:37

## 2018-05-30 RX ADMIN — KETOTIFEN FUMARATE 1 DROP(S): 0.34 SOLUTION OPHTHALMIC at 17:56

## 2018-05-30 RX ADMIN — TRAMADOL HYDROCHLORIDE 100 MILLIGRAM(S): 50 TABLET ORAL at 07:27

## 2018-05-30 RX ADMIN — Medication 650 MILLIGRAM(S): at 17:55

## 2018-05-30 RX ADMIN — PANTOPRAZOLE SODIUM 40 MILLIGRAM(S): 20 TABLET, DELAYED RELEASE ORAL at 06:45

## 2018-05-30 RX ADMIN — Medication 6: at 12:48

## 2018-05-30 RX ADMIN — BUDESONIDE AND FORMOTEROL FUMARATE DIHYDRATE 2 PUFF(S): 160; 4.5 AEROSOL RESPIRATORY (INHALATION) at 20:08

## 2018-05-30 RX ADMIN — Medication 500 MILLIGRAM(S): at 17:57

## 2018-05-30 RX ADMIN — Medication 1 SPRAY(S): at 00:24

## 2018-05-30 RX ADMIN — Medication 100 MILLIGRAM(S): at 22:12

## 2018-05-30 RX ADMIN — Medication 650 MILLIGRAM(S): at 00:54

## 2018-05-30 RX ADMIN — Medication 5 MILLIGRAM(S): at 17:55

## 2018-05-30 RX ADMIN — Medication 650 MILLIGRAM(S): at 00:24

## 2018-05-30 RX ADMIN — TRAMADOL HYDROCHLORIDE 100 MILLIGRAM(S): 50 TABLET ORAL at 22:43

## 2018-05-30 RX ADMIN — Medication 20 MILLIGRAM(S): at 22:12

## 2018-05-30 RX ADMIN — Medication 650 MILLIGRAM(S): at 06:45

## 2018-05-30 RX ADMIN — CLOPIDOGREL BISULFATE 75 MILLIGRAM(S): 75 TABLET, FILM COATED ORAL at 12:35

## 2018-05-30 RX ADMIN — KETOTIFEN FUMARATE 1 DROP(S): 0.34 SOLUTION OPHTHALMIC at 06:51

## 2018-05-30 RX ADMIN — GABAPENTIN 300 MILLIGRAM(S): 400 CAPSULE ORAL at 06:45

## 2018-05-30 RX ADMIN — Medication 8 UNIT(S): at 18:05

## 2018-05-30 RX ADMIN — BUDESONIDE AND FORMOTEROL FUMARATE DIHYDRATE 2 PUFF(S): 160; 4.5 AEROSOL RESPIRATORY (INHALATION) at 09:37

## 2018-05-30 RX ADMIN — Medication 125 MICROGRAM(S): at 06:45

## 2018-05-30 RX ADMIN — Medication 40 MILLIGRAM(S): at 06:45

## 2018-05-30 RX ADMIN — TRAMADOL HYDROCHLORIDE 100 MILLIGRAM(S): 50 TABLET ORAL at 06:45

## 2018-05-30 RX ADMIN — Medication 8 UNIT(S): at 12:47

## 2018-05-30 RX ADMIN — Medication 600 MILLIGRAM(S): at 17:55

## 2018-05-30 RX ADMIN — GABAPENTIN 300 MILLIGRAM(S): 400 CAPSULE ORAL at 17:57

## 2018-05-30 RX ADMIN — SENNA PLUS 2 TABLET(S): 8.6 TABLET ORAL at 22:13

## 2018-05-30 RX ADMIN — CHLORHEXIDINE GLUCONATE 1 APPLICATION(S): 213 SOLUTION TOPICAL at 09:28

## 2018-05-30 RX ADMIN — Medication 1 SPRAY(S): at 06:44

## 2018-05-30 RX ADMIN — Medication 650 MILLIGRAM(S): at 07:28

## 2018-05-30 RX ADMIN — LORATADINE 10 MILLIGRAM(S): 10 TABLET ORAL at 12:37

## 2018-05-30 RX ADMIN — Medication 10 MILLIGRAM(S): at 06:45

## 2018-05-30 RX ADMIN — AMLODIPINE BESYLATE 5 MILLIGRAM(S): 2.5 TABLET ORAL at 06:45

## 2018-05-30 RX ADMIN — Medication 500 MILLIGRAM(S): at 06:53

## 2018-05-30 RX ADMIN — TRAMADOL HYDROCHLORIDE 100 MILLIGRAM(S): 50 TABLET ORAL at 22:13

## 2018-05-30 RX ADMIN — Medication 8 UNIT(S): at 09:27

## 2018-05-30 RX ADMIN — Medication 4: at 09:27

## 2018-05-30 RX ADMIN — Medication 50 MILLIGRAM(S): at 06:45

## 2018-05-30 RX ADMIN — INSULIN GLARGINE 18 UNIT(S): 100 INJECTION, SOLUTION SUBCUTANEOUS at 22:13

## 2018-05-30 NOTE — DIETITIAN INITIAL EVALUATION ADULT. - OTHER INFO
Nutrition assessment initiated for LOS. Pt. alert, oriented , tolerating PO , taking 100% of the meals , no known food allergies , no difficulty chewing, swallowing , reports being non compliance to therapeutic diet , provided written & verbal information on consistent carbohydrate , low salt , low fat calorie controlled diet , wt. loss tips and cooking for wt. reduction . Pt. has intervention from diabetes educator and diabetic champion . Pt. known from last admission in Sept. 2017, noted wt. increase of 120# in 9 months and Hemoglobin A1C increased from 8.9% to 10.3% .

## 2018-05-30 NOTE — PROGRESS NOTE ADULT - NEUROLOGICAL DETAILS
alert and oriented x 3
alert and oriented x 3
alert and oriented x 3/responds to pain/responds to verbal commands
responds to pain/responds to verbal commands/alert and oriented x 3
alert and oriented x 3/responds to verbal commands/responds to pain
responds to verbal commands/alert and oriented x 3/responds to pain

## 2018-05-30 NOTE — PROGRESS NOTE ADULT - ATTENDING COMMENTS
Pt stable for d/c  home bipap set up. has home O2  homecare set up  outpt f/u scheduled for pulmonary and endocrine

## 2018-05-30 NOTE — PROGRESS NOTE ADULT - ASSESSMENT
Assessment:  61 y/o F with COPD, pulmonary HTN, RA on prednisone, TIA, HTN p/w dyspnea fever and mucus production.      Problem/Plan - 1:  ·  Problem: Chronic obstructive pulmonary disease with acute lower respiratory infection.  Plan:   - with exacerbation  - c/w saline nebs   - completed course of abx, monitor off abx  - steroids to 10mg daily - chronic steroid dose  - supplemental oxygen as needed  - Bipap order placed  -overnight pulse oximetry reviewed - bipap for home upon d/c     Problem/Plan - 2:  ·  Problem: Pulmonary hypertension.  Plan:   - cont lasix at home dose.      Problem/Plan - 3:  ·  Problem: Rheumatoid arthritis, involving unspecified site, unspecified rheumatoid factor presence.  Plan:   - on chronic steroids, 10mg at home (continue this dose)  - cont pain medications as needed  - pt has outpt rheumatologist who gives orencia (has been off for about 2 months 2/2 being on antibiotics)  - Naprosyn discontinued 2/2 SAM. Tylenol instead   - Continue with home dose of sulfasalazine     Problem/Plan - 4:  ·  Problem: Essential (primary) hypertension.  Plan:   - cont meds  - monitor B/P.     Problem/Plan - 5:  ·  Problem: Type 2 diabetes mellitus.  Plan:   - with a1c of 10.3  - pt is only on oral medications at home  - will start lantus and humalog premeals with moderate ISS  - endo consulted for medication regimen establishment as pt states she will have difficulty drawing insulin and injecting herself 2/2 RA (son is moving out of state)   - endo continuing to adjust DM meds as needed  - DM nurse educated patient   - will need all insulin supplies and new glucometer for discharge. Will send suggested regimen to pharmacy today for pricing  - continue to monitor FSs.      Problem/Plan - 6:  Problem: Adult Hypothyroidism. Plan:   - TSH 33.82  - synthroid to 125mcg  - repeat TFTs in 4-6 weeks.     Problem/Plan - 7:  ·  Problem: Need for prophylactic measure.  Plan:   - heparin SQ.      Problem/Plan - 8:  ·  Problem: SAM (acute kidney injury).  Plan:   -Held naprosyn  -Re-dosed gabapentin for cr cl  -Monitor bun/cr, Cr improving. Continue to monitor     Problem/Plan - 9:  ·  Problem: Nasal congestion  -Fluticasone     -Changed Nasal ocean spray from PRN to standing       Problem/Plan - 10:  ·  Problem: Constipation  -Colace and senna added to existing regimen  -Encouraged continue use of prn suppository    dispo: PT recommend rehab, patient refused requesting to be discharged home.  CM aware and working on home equipment and home needs Assessment:  63 y/o F with COPD, pulmonary HTN, RA on prednisone, TIA, HTN p/w dyspnea fever and mucus production.      Problem/Plan - 1:  ·  Problem: Chronic obstructive pulmonary disease with acute lower respiratory infection.  Plan:   - with exacerbation  - c/w saline nebs   - completed course of abx, monitor off abx  - steroids to 10mg daily - chronic steroid dose  - supplemental oxygen as needed  - Bipap order placed  -overnight pulse oximetry reviewed - bipap for home upon d/c  - plan for discharge today     Problem/Plan - 2:  ·  Problem: Pulmonary hypertension.  Plan:   - cont lasix at home dose.      Problem/Plan - 3:  ·  Problem: Rheumatoid arthritis, involving unspecified site, unspecified rheumatoid factor presence.  Plan:   - on chronic steroids, 10mg at home (continue this dose)  - cont pain medications as needed  - pt has outpt rheumatologist who gives orencia (has been off for about 2 months 2/2 being on antibiotics)  - Naprosyn discontinued 2/2 SAM. Tylenol instead   - Continue with home dose of sulfasalazine     Problem/Plan - 4:  ·  Problem: Essential (primary) hypertension.  Plan:   - cont meds  - monitor B/P.     Problem/Plan - 5:  ·  Problem: Type 2 diabetes mellitus.  Plan:   - with a1c of 10.3  - pt is only on oral medications at home  - will start lantus and humalog premeals with moderate ISS  - endo consulted for medication regimen establishment as pt states she will have difficulty drawing insulin and injecting herself 2/2 RA (son is moving out of state)   - endo continuing to adjust DM meds as needed  - DM nurse educated patient   - will need all insulin supplies and new glucometer for discharge. Will send suggested regimen to pharmacy today for pricing  - continue to monitor FSs.      Problem/Plan - 6:  Problem: Adult Hypothyroidism. Plan:   - TSH 33.82  - synthroid to 125mcg  - repeat TFTs in 4-6 weeks.     Problem/Plan - 7:  ·  Problem: Need for prophylactic measure.  Plan:   - heparin SQ.      Problem/Plan - 8:  ·  Problem: SAM (acute kidney injury).  Plan:   -Held naprosyn  -Re-dosed gabapentin for cr cl  -Monitor bun/cr, Cr improving. Continue to monitor     Problem/Plan - 9:  ·  Problem: Nasal congestion  -Fluticasone     -Changed Nasal ocean spray from PRN to standing       Problem/Plan - 10:  ·  Problem: Constipation  -Colace and senna added to existing regimen  -Encouraged continue use of prn suppository    dispo: PT recommend rehab, patient refused requesting to be discharged home.  CM aware and working on home equipment and home needs for today

## 2018-05-30 NOTE — CONSULT NOTE ADULT - SUBJECTIVE AND OBJECTIVE BOX
61 yo woman w/ hx of COPD, pulmonary HTN, RA on prednisone, CVA, HTN, DM who presented for dyspnea x 3 days.  Treated for COPD exacerbation.  Patient is going to enroll in a new trial/pulm rehab program.  Cardiology consulted for cardiac risk assessment.    Has had sharp left sided chest pain w/ palpitations for a year now, which occur seldomly.  Last 2011 which was normal.        PMH:   Diverticulosis  Cellulitis  CVA (Cerebral Vascular Accident)  RA (Rheumatoid Arthritis)  Tooth Abscess  Arthritis  Cigarette Smoker  Chronic Asthma  Adult Hypothyroidism  Borderline Diabetes Mellitus  High Cholesterol  H/O: HTN      PSH:   Wrist Disorder  History of  Section      Medications:   acetaminophen   Tablet. 650 milliGRAM(s) Oral every 6 hours  ALBUTerol    90 MICROgram(s) HFA Inhaler 1 Puff(s) Inhalation every 4 hours  amLODIPine   Tablet 5 milliGRAM(s) Oral daily  bisacodyl Suppository 10 milliGRAM(s) Rectal daily PRN  buDESOnide 160 MICROgram(s)/formoterol 4.5 MICROgram(s) Inhaler 2 Puff(s) Inhalation two times a day  chlorhexidine 4% Liquid 1 Application(s) Topical <User Schedule>  clopidogrel Tablet 75 milliGRAM(s) Oral daily  dextrose 40% Gel 15 Gram(s) Oral once PRN  dextrose 5%. 1000 milliLiter(s) IV Continuous <Continuous>  dextrose 50% Injectable 12.5 Gram(s) IV Push once  dextrose 50% Injectable 25 Gram(s) IV Push once  dextrose 50% Injectable 25 Gram(s) IV Push once  Diclofenac Topical Gel 1% 1 Application(s) 1 Application(s) Topical two times a day PRN  docusate sodium 100 milliGRAM(s) Oral three times a day  fluticasone propionate 50 MICROgram(s)/spray Nasal Spray 1 Spray(s) Both Nostrils two times a day  furosemide    Tablet 20 milliGRAM(s) Oral at bedtime  furosemide    Tablet 40 milliGRAM(s) Oral daily  gabapentin 300 milliGRAM(s) Oral two times a day  glucagon  Injectable 1 milliGRAM(s) IntraMuscular once PRN  guaiFENesin  milliGRAM(s) Oral every 12 hours  heparin  Injectable 7500 Unit(s) SubCutaneous every 8 hours  insulin glargine Injectable (LANTUS) 18 Unit(s) SubCutaneous at bedtime  insulin lispro (HumaLOG) corrective regimen sliding scale   SubCutaneous three times a day before meals  insulin lispro (HumaLOG) corrective regimen sliding scale   SubCutaneous at bedtime  insulin lispro Injectable (HumaLOG) 8 Unit(s) SubCutaneous before lunch  insulin lispro Injectable (HumaLOG) 8 Unit(s) SubCutaneous with dinner  insulin lispro Injectable (HumaLOG) 8 Unit(s) SubCutaneous before breakfast  ketotifen 0.025% Ophthalmic Solution 1 Drop(s) Both EYES two times a day  levothyroxine 125 MICROGram(s) Oral daily  loratadine 10 milliGRAM(s) Oral daily  metoprolol succinate ER 50 milliGRAM(s) Oral daily  oxybutynin 5 milliGRAM(s) Oral two times a day  pantoprazole    Tablet 40 milliGRAM(s) Oral before breakfast  polyethylene glycol 3350 17 Gram(s) Oral daily  predniSONE   Tablet 10 milliGRAM(s) Oral daily  senna 2 Tablet(s) Oral at bedtime  simethicone 80 milliGRAM(s) Chew every 8 hours PRN  simvastatin 20 milliGRAM(s) Oral at bedtime  sodium chloride 0.65% Nasal 1 Spray(s) Both Nostrils four times a day  sodium chloride 3%  Inhalation 3 milliLiter(s) Inhalation every 6 hours  sulfaSALAzine 500 milliGRAM(s) Oral two times a day  tiotropium 18 MICROgram(s) Capsule 1 Capsule(s) Inhalation daily  traMADol 100 milliGRAM(s) Oral three times a day      Allergies:  No Known Allergies      FAMILY HISTORY:  No pertinent family history in first degree relatives      Social History:  Smoking History:  Alcohol Use:  Drug Use:    Review of Systems:  REVIEW OF SYSTEMS:    CONSTITUTIONAL: No weakness, fevers or chills  EYES/ENT: No visual changes;  No dysphagia  NECK: No pain or stiffness  RESPIRATORY: No cough, wheezing, hemoptysis; No shortness of breath  CARDIOVASCULAR: No chest pain or palpitations; No lower extremity edema  GASTROINTESTINAL: No abdominal or epigastric pain. No nausea, vomiting, or hematemesis; No diarrhea or constipation. No melena or hematochezia.  BACK: No back pain  GENITOURINARY: No dysuria, frequency or hematuria  NEUROLOGICAL: No numbness or weakness  SKIN: No itching, burning, rashes, or lesions   All other review of systems is negative unless indicated above.    Physical Exam:  T(F): 98.5 (05-30), Max: 98.5 (05-29)  HR: 86 (05-30) (78 - 93)  BP: 125/85 (05-30) (125/85 - 132/85)  RR: 19 (-30)  SpO2: 98% (-30)  GENERAL: No acute distress, well-developed  HEAD:  Atraumatic, Normocephalic  ENT: EOMI, PERRLA, conjunctiva and sclera clear, Neck supple, No JVD, moist mucosa  CHEST/LUNG: Decreased breath sounds bilaterally w/ no wheeze, equal breath sounds bilaterally   BACK: No spinal tenderness  HEART: Regular rate and rhythm; No murmurs, rubs, or gallops  ABDOMEN: Soft, Nontender, Nondistended; Bowel sounds present  EXTREMITIES:  No clubbing, cyanosis, or edema  PSYCH: Nl behavior, nl affect  NEUROLOGY: AAOx3, non-focal, cranial nerves intact  SKIN: Normal color, No rashes or lesions  LINES:    Cardiovascular Diagnostic Testing:    ECG: sinus rhythm, normal axis, low voltage in limb leads, no acute ischemic changes     Echo:    < from: Transthoracic Echocardiogram (17 @ 13:10) >  1. Mitral annular calcification, otherwise normal mitral  valve. Minimal mitral regurgitation.  2. Normal left ventricular internal dimensions and wall  thicknesses.  3. Hyperdynamic left ventricle.  4. The right ventricle is not well visualized; grossly  normal right ventricular systolic function.  5. Estimated right ventricular systolic pressure equals 57  mm Hg, assuming right atrial pressure equals 10 mm Hg,  consistent with moderate pulmonary hypertension.    < end of copied text >      Stress Testing:  < from: Nuclear Stress Test, Pharmacologic (11 @ 07:48) >  * Myocardial Perfusion SPECT results are normal.  * Normal myocardial perfusion scan,with no evidence of  infarction or inducible ischemia.  * Post-stress gated wall motion analysis was performed  (LVEF > 70%;LVEDV = 59 ml.), revealing normal LV function.  RV function appears normal.    < end of copied text >      Interpretation of Telemetry:    Imaging:    Labs: Personally reviewed        139  |  97<L>  |  53<H>  ----------------------------<  215<H>  4.0   |  25  |  1.50<H>    Ca    9.9      29 May 2018 06:00    Hemoglobin A1C, Whole Blood: 10.3 % ( @ 07:00)    Assessment     61 yo woman w/ hx of COPD, pulmonary HTN, RA on prednisone, CVA, HTN, DM who presented for dyspnea x 3 days.  Treated for COPD exacerbation.  Plans for enrolling patient in pulm rehab program/trial as outpatient.   Having atypical chest pain and needs a repeat pharmacological stress test     RECS  - recommend follow up with outpatient cardiologist and perform pharmacological stress test then   - no cardiac objections for discharge    Please Call [phone] for questions/concerns     KIESHA Cano MD   [facility] [phone]
HPI:  63 y/o F with COPD, pulmonary HTN, RA on prednisone, TIA, HTN p/w dyspnea and fever for the past 3 days.  Pt reports feeling very dyspneic over baseline.  Occasionally has orthopnea, but not consistently.  She reports fevers up to 101F intermittently with diaphoresis, and chills.  She has minimal cough, but continuously brings up white to yellow mucus.  She initially felt postnasal drip which has improved, but continues to have chest congestion.      Endocrine history:  Was told of borderline DM in the past but never outright. Long term use of prednisone for years, usual dose is 10mg but increases for RA flare. Previously had a glucometer and was able to check her sugars. She has significant hand arthritis which limits some activities. She is aware of DM diet and reports she used to follow it better but lately eating more carb items (cooked by her aid). Her son is moving to [place] soon. She has been taking glimepiride 1mg daily for the "borderline DM." Never on other oral agents or insulin.  She reports history of hypothyroidism 3-4 years. Takes LT4 100 mcg on empty stomach. Takes regularly except missed 2 doses recently.      PAST MEDICAL & SURGICAL HISTORY:  Diverticulosis  CVA (Cerebral Vascular Accident)  RA (Rheumatoid Arthritis)  Tooth Abscess  Arthritis  Cigarette Smoker  Chronic Asthma  Adult Hypothyroidism  Borderline Diabetes Mellitus  High Cholesterol  H/O: HTN  Wrist Disorder: S/P Surgery  History of  Section      FAMILY HISTORY:  + siblings with DM      Social History: former smoker    Outpatient Medications: glimepiride 1mg daily    MEDICATIONS  (STANDING):  ALBUTerol    90 MICROgram(s) HFA Inhaler 1 Puff(s) Inhalation every 4 hours  ALBUTerol/ipratropium for Nebulization 3 milliLiter(s) Nebulizer every 6 hours  amLODIPine   Tablet 5 milliGRAM(s) Oral daily  buDESOnide 160 MICROgram(s)/formoterol 4.5 MICROgram(s) Inhaler 2 Puff(s) Inhalation two times a day  clopidogrel Tablet 75 milliGRAM(s) Oral daily  dextrose 5%. 1000 milliLiter(s) (50 mL/Hr) IV Continuous <Continuous>  dextrose 50% Injectable 12.5 Gram(s) IV Push once  dextrose 50% Injectable 25 Gram(s) IV Push once  dextrose 50% Injectable 25 Gram(s) IV Push once  furosemide    Tablet 20 milliGRAM(s) Oral at bedtime  furosemide    Tablet 40 milliGRAM(s) Oral daily  gabapentin 300 milliGRAM(s) Oral three times a day  heparin  Injectable 7500 Unit(s) SubCutaneous every 8 hours  insulin glargine Injectable (LANTUS) 15 Unit(s) SubCutaneous at bedtime  insulin lispro (HumaLOG) corrective regimen sliding scale   SubCutaneous three times a day before meals  insulin lispro (HumaLOG) corrective regimen sliding scale   SubCutaneous at bedtime  insulin lispro Injectable (HumaLOG) 5 Unit(s) SubCutaneous three times a day before meals  levoFLOXacin IVPB 750 milliGRAM(s) IV Intermittent every 24 hours  levothyroxine 125 MICROGram(s) Oral daily  loratadine 10 milliGRAM(s) Oral daily  metoprolol succinate ER 50 milliGRAM(s) Oral daily  oxybutynin 5 milliGRAM(s) Oral two times a day  pantoprazole    Tablet 40 milliGRAM(s) Oral before breakfast  predniSONE   Tablet 20 milliGRAM(s) Oral daily  simvastatin 20 milliGRAM(s) Oral at bedtime  tiotropium 18 MICROgram(s) Capsule 1 Capsule(s) Inhalation daily  traMADol 100 milliGRAM(s) Oral three times a day    MEDICATIONS  (PRN):  dextrose 40% Gel 15 Gram(s) Oral once PRN Blood Glucose LESS THAN 70 milliGRAM(s)/deciliter  Diclofenac Topical Gel 1% 1 Application(s) 1 Application(s) Topical two times a day PRN pain  glucagon  Injectable 1 milliGRAM(s) IntraMuscular once PRN Glucose LESS THAN 70 milligrams/deciliter  naproxen 250 milliGRAM(s) Oral two times a day PRN moderate pain      Allergies    No Known Allergies    Intolerances      Review of Systems:  Constitutional: +fever  Eyes: No blurry vision  Neuro: No tremors  HEENT: No pain  Cardiovascular: No chest pain, palpitations  Respiratory: +SOB  GI: No nausea, vomiting, abdominal pain  : No dysuria  Skin: no rash  Psych: no depression  Endocrine: no polyuria, polydipsia  Hem/lymph: no swelling  Osteoporosis: no fractures    ALL OTHER SYSTEMS REVIEWED AND NEGATIVE    UNABLE TO OBTAIN    PHYSICAL EXAM:  VITALS: T(C): 37.3 (18 @ 12:36)  T(F): 99.1 (18 @ 12:36), Max: 99.1 (18 @ 12:36)  HR: 95 (18 @ 12:36) (84 - 98)  BP: 113/73 (18 @ 12:36) (113/73 - 137/75)  RR:  (18 - 20)  SpO2:  (96% - 99%)  Wt(kg): --  GENERAL: NAD, well-groomed, well-developed  EYES: No proptosis, no lid lag, anicteric  HEENT:  Atraumatic, Normocephalic, moist mucous membranes  THYROID: Normal size, no palpable nodules  RESPIRATORY: Clear to auscultation bilaterally; No rales, rhonchi, wheezing  CARDIOVASCULAR: Regular rate and rhythm; No murmurs; no peripheral edema  GI: Soft, nontender, non distended, normal bowel sounds  SKIN: Dry, intact, No rashes or lesions  MUSCULOSKELETAL: hands with significant arthritic deformities b/l  NEURO: sensation intact, extraocular movements intact, no tremor  PSYCH: Alert and oriented x 3, normal affect, normal mood  CUSHING'S SIGNS: no striae      CAPILLARY BLOOD GLUCOSE      POCT Blood Glucose.: 240 mg/dL (24 May 2018 18:17)  POCT Blood Glucose.: 367 mg/dL (24 May 2018 13:17)  POCT Blood Glucose.: 312 mg/dL (24 May 2018 09:15)  POCT Blood Glucose.: 365 mg/dL (23 May 2018 20:58)                            12.5   10.47 )-----------( 295      ( 22 May 2018 18:00 )             39.2           139  |  97<L>  |  27<H>  ----------------------------<  273<H>  4.7   |  30  |  1.20    EGFR if : 57  EGFR if non : 49    Ca    9.3          TPro  7.8  /  Alb  3.9  /  TBili  0.2  /  DBili  x   /  AST  40<H>  /  ALT  21  /  AlkPhos  79        Thyroid Function Tests:   @ 20:08 TSH 33.82 FreeT4 -- T3 -- Anti TPO -- Anti Thyroglobulin Ab -- TSI --   @ 18:00 TSH 31.16 FreeT4 -- T3 -- Anti TPO -- Anti Thyroglobulin Ab -- TSI --      Hemoglobin A1C, Whole Blood: 10.3 % <H> [4.0 - 5.6] (18 @ 07:00)          Radiology:

## 2018-05-30 NOTE — PROGRESS NOTE ADULT - SUBJECTIVE AND OBJECTIVE BOX
CHIEF COMPLAINT: Patient is a 62y old  Female who presents with a chief complaint of dyspnea, fever (28 May 2018 12:19)      Interval Events:      OBJECTIVE:  ICU Vital Signs Last 24 Hrs  T(C): 36.9 (30 May 2018 06:42), Max: 36.9 (29 May 2018 14:20)  T(F): 98.4 (30 May 2018 06:42), Max: 98.5 (29 May 2018 14:20)  HR: 84 (30 May 2018 06:42) (77 - 88)  BP: 128/74 (30 May 2018 06:42) (121/72 - 132/85)  BP(mean): --  ABP: --  ABP(mean): --  RR: 20 (30 May 2018 06:42) (18 - 20)  SpO2: 100% (30 May 2018 06:42) (94% - 100%)        05-29 @ 07:01  -  05-30 @ 07:00  --------------------------------------------------------  IN: 1200 mL / OUT: 0 mL / NET: 1200 mL      CAPILLARY BLOOD GLUCOSE      POCT Blood Glucose.: 110 mg/dL (29 May 2018 21:43)        HOSPITAL MEDICATIONS:  MEDICATIONS  (STANDING):  acetaminophen   Tablet. 650 milliGRAM(s) Oral every 6 hours  ALBUTerol    90 MICROgram(s) HFA Inhaler 1 Puff(s) Inhalation every 4 hours  amLODIPine   Tablet 5 milliGRAM(s) Oral daily  buDESOnide 160 MICROgram(s)/formoterol 4.5 MICROgram(s) Inhaler 2 Puff(s) Inhalation two times a day  chlorhexidine 4% Liquid 1 Application(s) Topical <User Schedule>  clopidogrel Tablet 75 milliGRAM(s) Oral daily  dextrose 5%. 1000 milliLiter(s) (50 mL/Hr) IV Continuous <Continuous>  dextrose 50% Injectable 12.5 Gram(s) IV Push once  dextrose 50% Injectable 25 Gram(s) IV Push once  dextrose 50% Injectable 25 Gram(s) IV Push once  docusate sodium 100 milliGRAM(s) Oral three times a day  fluticasone propionate 50 MICROgram(s)/spray Nasal Spray 1 Spray(s) Both Nostrils two times a day  furosemide    Tablet 20 milliGRAM(s) Oral at bedtime  furosemide    Tablet 40 milliGRAM(s) Oral daily  gabapentin 300 milliGRAM(s) Oral two times a day  guaiFENesin  milliGRAM(s) Oral every 12 hours  heparin  Injectable 7500 Unit(s) SubCutaneous every 8 hours  insulin glargine Injectable (LANTUS) 18 Unit(s) SubCutaneous at bedtime  insulin lispro (HumaLOG) corrective regimen sliding scale   SubCutaneous three times a day before meals  insulin lispro (HumaLOG) corrective regimen sliding scale   SubCutaneous at bedtime  insulin lispro Injectable (HumaLOG) 8 Unit(s) SubCutaneous before lunch  insulin lispro Injectable (HumaLOG) 8 Unit(s) SubCutaneous with dinner  insulin lispro Injectable (HumaLOG) 8 Unit(s) SubCutaneous before breakfast  ketotifen 0.025% Ophthalmic Solution 1 Drop(s) Both EYES two times a day  levothyroxine 125 MICROGram(s) Oral daily  loratadine 10 milliGRAM(s) Oral daily  metoprolol succinate ER 50 milliGRAM(s) Oral daily  oxybutynin 5 milliGRAM(s) Oral two times a day  pantoprazole    Tablet 40 milliGRAM(s) Oral before breakfast  polyethylene glycol 3350 17 Gram(s) Oral daily  predniSONE   Tablet 10 milliGRAM(s) Oral daily  senna 2 Tablet(s) Oral at bedtime  simvastatin 20 milliGRAM(s) Oral at bedtime  sodium chloride 0.65% Nasal 1 Spray(s) Both Nostrils four times a day  sodium chloride 3%  Inhalation 3 milliLiter(s) Inhalation every 6 hours  sulfaSALAzine 500 milliGRAM(s) Oral two times a day  tiotropium 18 MICROgram(s) Capsule 1 Capsule(s) Inhalation daily  traMADol 100 milliGRAM(s) Oral three times a day    MEDICATIONS  (PRN):  bisacodyl Suppository 10 milliGRAM(s) Rectal daily PRN Constipation  dextrose 40% Gel 15 Gram(s) Oral once PRN Blood Glucose LESS THAN 70 milliGRAM(s)/deciliter  Diclofenac Topical Gel 1% 1 Application(s) 1 Application(s) Topical two times a day PRN pain  glucagon  Injectable 1 milliGRAM(s) IntraMuscular once PRN Glucose LESS THAN 70 milligrams/deciliter  simethicone 80 milliGRAM(s) Chew every 8 hours PRN Gas      LABS:    05-29    139  |  97<L>  |  53<H>  ----------------------------<  215<H>  4.0   |  25  |  1.50<H>    Ca    9.9      29 May 2018 06:00            Venous Blood Gas:  05-29 @ 06:00  7.38/56/51/29/83.7  VBG Lactate: --      MICROBIOLOGY:     RADIOLOGY:  [ ] Reviewed and interpreted by me    PULMONARY FUNCTION TESTS:    EKG: CHIEF COMPLAINT: Patient is a 62y old  Female who presents with a chief complaint of dyspnea, fever (28 May 2018 12:19)      Interval Events: no acute events    OBJECTIVE:  ICU Vital Signs Last 24 Hrs  T(C): 36.9 (30 May 2018 06:42), Max: 36.9 (29 May 2018 14:20)  T(F): 98.4 (30 May 2018 06:42), Max: 98.5 (29 May 2018 14:20)  HR: 84 (30 May 2018 06:42) (77 - 88)  BP: 128/74 (30 May 2018 06:42) (121/72 - 132/85)  BP(mean): --  ABP: --  ABP(mean): --  RR: 20 (30 May 2018 06:42) (18 - 20)  SpO2: 100% (30 May 2018 06:42) (94% - 100%)        05-29 @ 07:01  -  05-30 @ 07:00  --------------------------------------------------------  IN: 1200 mL / OUT: 0 mL / NET: 1200 mL      CAPILLARY BLOOD GLUCOSE      POCT Blood Glucose.: 110 mg/dL (29 May 2018 21:43)        HOSPITAL MEDICATIONS:  MEDICATIONS  (STANDING):  acetaminophen   Tablet. 650 milliGRAM(s) Oral every 6 hours  ALBUTerol    90 MICROgram(s) HFA Inhaler 1 Puff(s) Inhalation every 4 hours  amLODIPine   Tablet 5 milliGRAM(s) Oral daily  buDESOnide 160 MICROgram(s)/formoterol 4.5 MICROgram(s) Inhaler 2 Puff(s) Inhalation two times a day  chlorhexidine 4% Liquid 1 Application(s) Topical <User Schedule>  clopidogrel Tablet 75 milliGRAM(s) Oral daily  dextrose 5%. 1000 milliLiter(s) (50 mL/Hr) IV Continuous <Continuous>  dextrose 50% Injectable 12.5 Gram(s) IV Push once  dextrose 50% Injectable 25 Gram(s) IV Push once  dextrose 50% Injectable 25 Gram(s) IV Push once  docusate sodium 100 milliGRAM(s) Oral three times a day  fluticasone propionate 50 MICROgram(s)/spray Nasal Spray 1 Spray(s) Both Nostrils two times a day  furosemide    Tablet 20 milliGRAM(s) Oral at bedtime  furosemide    Tablet 40 milliGRAM(s) Oral daily  gabapentin 300 milliGRAM(s) Oral two times a day  guaiFENesin  milliGRAM(s) Oral every 12 hours  heparin  Injectable 7500 Unit(s) SubCutaneous every 8 hours  insulin glargine Injectable (LANTUS) 18 Unit(s) SubCutaneous at bedtime  insulin lispro (HumaLOG) corrective regimen sliding scale   SubCutaneous three times a day before meals  insulin lispro (HumaLOG) corrective regimen sliding scale   SubCutaneous at bedtime  insulin lispro Injectable (HumaLOG) 8 Unit(s) SubCutaneous before lunch  insulin lispro Injectable (HumaLOG) 8 Unit(s) SubCutaneous with dinner  insulin lispro Injectable (HumaLOG) 8 Unit(s) SubCutaneous before breakfast  ketotifen 0.025% Ophthalmic Solution 1 Drop(s) Both EYES two times a day  levothyroxine 125 MICROGram(s) Oral daily  loratadine 10 milliGRAM(s) Oral daily  metoprolol succinate ER 50 milliGRAM(s) Oral daily  oxybutynin 5 milliGRAM(s) Oral two times a day  pantoprazole    Tablet 40 milliGRAM(s) Oral before breakfast  polyethylene glycol 3350 17 Gram(s) Oral daily  predniSONE   Tablet 10 milliGRAM(s) Oral daily  senna 2 Tablet(s) Oral at bedtime  simvastatin 20 milliGRAM(s) Oral at bedtime  sodium chloride 0.65% Nasal 1 Spray(s) Both Nostrils four times a day  sodium chloride 3%  Inhalation 3 milliLiter(s) Inhalation every 6 hours  sulfaSALAzine 500 milliGRAM(s) Oral two times a day  tiotropium 18 MICROgram(s) Capsule 1 Capsule(s) Inhalation daily  traMADol 100 milliGRAM(s) Oral three times a day    MEDICATIONS  (PRN):  bisacodyl Suppository 10 milliGRAM(s) Rectal daily PRN Constipation  dextrose 40% Gel 15 Gram(s) Oral once PRN Blood Glucose LESS THAN 70 milliGRAM(s)/deciliter  Diclofenac Topical Gel 1% 1 Application(s) 1 Application(s) Topical two times a day PRN pain  glucagon  Injectable 1 milliGRAM(s) IntraMuscular once PRN Glucose LESS THAN 70 milligrams/deciliter  simethicone 80 milliGRAM(s) Chew every 8 hours PRN Gas      LABS:    05-29    139  |  97<L>  |  53<H>  ----------------------------<  215<H>  4.0   |  25  |  1.50<H>    Ca    9.9      29 May 2018 06:00            Venous Blood Gas:  05-29 @ 06:00  7.38/56/51/29/83.7  VBG Lactate: --      MICROBIOLOGY:     RADIOLOGY:  [ ] Reviewed and interpreted by me    PULMONARY FUNCTION TESTS:    EKG:

## 2018-05-30 NOTE — DIETITIAN INITIAL EVALUATION ADULT. - PERTINENT MEDS FT
Norvasc, LASIX, Mucinex, Neurontin, Plavix, Lantus, Humalog, Zocor, Colace, Protonix , Senna, SulfaSALAzine,  Synthroid, Prednisone, Mylicon

## 2018-05-30 NOTE — DIETITIAN INITIAL EVALUATION ADULT. - NS AS NUTRI INTERV MEALS SNACK
General/healthful diet/Fat - modified diet/Mineral - modified diet/Energy - modified diet/Carbohydrate - modified diet

## 2018-05-31 VITALS
SYSTOLIC BLOOD PRESSURE: 113 MMHG | RESPIRATION RATE: 18 BRPM | DIASTOLIC BLOOD PRESSURE: 77 MMHG | OXYGEN SATURATION: 96 % | TEMPERATURE: 98 F | HEART RATE: 91 BPM

## 2018-05-31 DIAGNOSIS — M06.9 RHEUMATOID ARTHRITIS, UNSPECIFIED: ICD-10-CM

## 2018-05-31 LAB
GLUCOSE BLDC GLUCOMTR-MCNC: 208 MG/DL — HIGH (ref 70–99)
GLUCOSE BLDC GLUCOMTR-MCNC: 292 MG/DL — HIGH (ref 70–99)

## 2018-05-31 PROCEDURE — 99232 SBSQ HOSP IP/OBS MODERATE 35: CPT | Mod: GC

## 2018-05-31 PROCEDURE — 71250 CT THORAX DX C-: CPT | Mod: 26

## 2018-05-31 RX ORDER — PANTOPRAZOLE SODIUM 20 MG/1
1 TABLET, DELAYED RELEASE ORAL
Qty: 30 | Refills: 0 | OUTPATIENT
Start: 2018-05-31 | End: 2018-06-29

## 2018-05-31 RX ORDER — ALBUTEROL 90 UG/1
1 AEROSOL, METERED ORAL
Qty: 1 | Refills: 0
Start: 2018-05-31 | End: 2018-06-29

## 2018-05-31 RX ORDER — GABAPENTIN 400 MG/1
1 CAPSULE ORAL
Qty: 60 | Refills: 0 | OUTPATIENT
Start: 2018-05-31 | End: 2018-06-29

## 2018-05-31 RX ORDER — FUROSEMIDE 40 MG
1 TABLET ORAL
Qty: 0 | Refills: 0 | COMMUNITY

## 2018-05-31 RX ORDER — FUROSEMIDE 40 MG
1 TABLET ORAL
Qty: 30 | Refills: 0 | OUTPATIENT
Start: 2018-05-31 | End: 2018-06-29

## 2018-05-31 RX ADMIN — Medication 650 MILLIGRAM(S): at 05:59

## 2018-05-31 RX ADMIN — PANTOPRAZOLE SODIUM 40 MILLIGRAM(S): 20 TABLET, DELAYED RELEASE ORAL at 06:00

## 2018-05-31 RX ADMIN — TRAMADOL HYDROCHLORIDE 100 MILLIGRAM(S): 50 TABLET ORAL at 06:01

## 2018-05-31 RX ADMIN — TRAMADOL HYDROCHLORIDE 100 MILLIGRAM(S): 50 TABLET ORAL at 13:19

## 2018-05-31 RX ADMIN — Medication 600 MILLIGRAM(S): at 06:00

## 2018-05-31 RX ADMIN — Medication 1 SPRAY(S): at 00:11

## 2018-05-31 RX ADMIN — Medication 650 MILLIGRAM(S): at 13:19

## 2018-05-31 RX ADMIN — Medication 10 MILLIGRAM(S): at 06:00

## 2018-05-31 RX ADMIN — Medication 6: at 12:37

## 2018-05-31 RX ADMIN — Medication 650 MILLIGRAM(S): at 14:10

## 2018-05-31 RX ADMIN — Medication 5 MILLIGRAM(S): at 05:59

## 2018-05-31 RX ADMIN — CLOPIDOGREL BISULFATE 75 MILLIGRAM(S): 75 TABLET, FILM COATED ORAL at 13:19

## 2018-05-31 RX ADMIN — Medication 1 SPRAY(S): at 05:59

## 2018-05-31 RX ADMIN — KETOTIFEN FUMARATE 1 DROP(S): 0.34 SOLUTION OPHTHALMIC at 06:00

## 2018-05-31 RX ADMIN — GABAPENTIN 300 MILLIGRAM(S): 400 CAPSULE ORAL at 06:00

## 2018-05-31 RX ADMIN — Medication 4: at 07:56

## 2018-05-31 RX ADMIN — Medication 1 SPRAY(S): at 13:15

## 2018-05-31 RX ADMIN — AMLODIPINE BESYLATE 5 MILLIGRAM(S): 2.5 TABLET ORAL at 06:01

## 2018-05-31 RX ADMIN — TRAMADOL HYDROCHLORIDE 100 MILLIGRAM(S): 50 TABLET ORAL at 06:32

## 2018-05-31 RX ADMIN — Medication 650 MILLIGRAM(S): at 00:11

## 2018-05-31 RX ADMIN — BUDESONIDE AND FORMOTEROL FUMARATE DIHYDRATE 2 PUFF(S): 160; 4.5 AEROSOL RESPIRATORY (INHALATION) at 10:17

## 2018-05-31 RX ADMIN — Medication 125 MICROGRAM(S): at 06:00

## 2018-05-31 RX ADMIN — Medication 50 MILLIGRAM(S): at 05:59

## 2018-05-31 RX ADMIN — CHLORHEXIDINE GLUCONATE 1 APPLICATION(S): 213 SOLUTION TOPICAL at 13:15

## 2018-05-31 RX ADMIN — Medication 500 MILLIGRAM(S): at 06:00

## 2018-05-31 RX ADMIN — Medication 8 UNIT(S): at 12:37

## 2018-05-31 RX ADMIN — Medication 8 UNIT(S): at 07:57

## 2018-05-31 RX ADMIN — LORATADINE 10 MILLIGRAM(S): 10 TABLET ORAL at 13:19

## 2018-05-31 RX ADMIN — Medication 40 MILLIGRAM(S): at 06:00

## 2018-05-31 RX ADMIN — TRAMADOL HYDROCHLORIDE 100 MILLIGRAM(S): 50 TABLET ORAL at 14:09

## 2018-05-31 RX ADMIN — Medication 650 MILLIGRAM(S): at 00:41

## 2018-05-31 RX ADMIN — Medication 650 MILLIGRAM(S): at 06:30

## 2018-05-31 NOTE — PROGRESS NOTE ADULT - PROBLEM SELECTOR PROBLEM 6
SAM (acute kidney injury)
Adult Hypothyroidism
Need for prophylactic measure
Adult Hypothyroidism
Adult Hypothyroidism

## 2018-05-31 NOTE — PROGRESS NOTE ADULT - NS NEC GEN PE MLT EXAM PC
No bruits; no thyromegaly or nodules
detailed exam
detailed exam
No bruits; no thyromegaly or nodules
No bruits; no thyromegaly or nodules

## 2018-05-31 NOTE — PROGRESS NOTE ADULT - PROBLEM SELECTOR PLAN 2
continue with levothyroxine 125 mcg, dose increased on this admission.
- appears euvolemic  - cont lasix at home dose
continue with levothyroxine 125 mcg, dose increased on this admission.
- appears euvolemic  - cont lasix at home dose
Plan:   - cont lasix at home dose.
- appears euvolemic  - cont lasix at home dose
- cont lasix at home dose

## 2018-05-31 NOTE — PROGRESS NOTE ADULT - PROBLEM SELECTOR PLAN 8
- TSH 33.82  - synthroid to 125mcg  - repeat TFTs in 4-6 weeks.  -f/u endocrine as OP
-Held naprosyn  -Re-dosed gabapentin for cr cl  -Monitor bun/cr

## 2018-05-31 NOTE — PROGRESS NOTE ADULT - SUBJECTIVE AND OBJECTIVE BOX
CHIEF COMPLAINT:    Interval Events:    REVIEW OF SYSTEMS:  Constitutional:   Eyes:  ENT:  CV:  Resp:  GI:  :  MSK:  Integumentary:  Neurological:  Psychiatric:  Endocrine:  Hematologic/Lymphatic:  Allergic/Immunologic:  [ ] All other systems negative  [ ] Unable to assess ROS because ________    OBJECTIVE:  ICU Vital Signs Last 24 Hrs  T(C): 37.1 (31 May 2018 05:49), Max: 37.1 (30 May 2018 22:05)  T(F): 98.8 (31 May 2018 05:49), Max: 98.8 (30 May 2018 22:05)  HR: 91 (31 May 2018 07:55) (65 - 93)  BP: 122/65 (31 May 2018 05:49) (117/74 - 125/85)  BP(mean): --  ABP: --  ABP(mean): --  RR: 18 (31 May 2018 05:49) (18 - 19)  SpO2: 98% (31 May 2018 07:55) (98% - 100%)        05-30 @ 07:01  -  05-31 @ 07:00  --------------------------------------------------------  IN: 2000 mL / OUT: 0 mL / NET: 2000 mL      CAPILLARY BLOOD GLUCOSE      POCT Blood Glucose.: 208 mg/dL (31 May 2018 07:50)      PHYSICAL EXAM:  General:   HEENT:   Lymph Nodes:  Neck:   Respiratory:   Cardiovascular:   Abdomen:   Extremities:   Skin:   Neurological:  Psychiatry:    HOSPITAL MEDICATIONS:  MEDICATIONS  (STANDING):  acetaminophen   Tablet. 650 milliGRAM(s) Oral every 6 hours  ALBUTerol    90 MICROgram(s) HFA Inhaler 1 Puff(s) Inhalation every 4 hours  amLODIPine   Tablet 5 milliGRAM(s) Oral daily  buDESOnide 160 MICROgram(s)/formoterol 4.5 MICROgram(s) Inhaler 2 Puff(s) Inhalation two times a day  chlorhexidine 4% Liquid 1 Application(s) Topical <User Schedule>  clopidogrel Tablet 75 milliGRAM(s) Oral daily  dextrose 5%. 1000 milliLiter(s) (50 mL/Hr) IV Continuous <Continuous>  dextrose 50% Injectable 12.5 Gram(s) IV Push once  dextrose 50% Injectable 25 Gram(s) IV Push once  dextrose 50% Injectable 25 Gram(s) IV Push once  docusate sodium 100 milliGRAM(s) Oral three times a day  fluticasone propionate 50 MICROgram(s)/spray Nasal Spray 1 Spray(s) Both Nostrils two times a day  furosemide    Tablet 20 milliGRAM(s) Oral at bedtime  furosemide    Tablet 40 milliGRAM(s) Oral daily  gabapentin 300 milliGRAM(s) Oral two times a day  guaiFENesin  milliGRAM(s) Oral every 12 hours  heparin  Injectable 7500 Unit(s) SubCutaneous every 8 hours  insulin glargine Injectable (LANTUS) 18 Unit(s) SubCutaneous at bedtime  insulin lispro (HumaLOG) corrective regimen sliding scale   SubCutaneous three times a day before meals  insulin lispro (HumaLOG) corrective regimen sliding scale   SubCutaneous at bedtime  insulin lispro Injectable (HumaLOG) 8 Unit(s) SubCutaneous before lunch  insulin lispro Injectable (HumaLOG) 8 Unit(s) SubCutaneous with dinner  insulin lispro Injectable (HumaLOG) 8 Unit(s) SubCutaneous before breakfast  ketotifen 0.025% Ophthalmic Solution 1 Drop(s) Both EYES two times a day  levothyroxine 125 MICROGram(s) Oral daily  loratadine 10 milliGRAM(s) Oral daily  metoprolol succinate ER 50 milliGRAM(s) Oral daily  oxybutynin 5 milliGRAM(s) Oral two times a day  pantoprazole    Tablet 40 milliGRAM(s) Oral before breakfast  polyethylene glycol 3350 17 Gram(s) Oral daily  predniSONE   Tablet 10 milliGRAM(s) Oral daily  senna 2 Tablet(s) Oral at bedtime  simvastatin 20 milliGRAM(s) Oral at bedtime  sodium chloride 0.65% Nasal 1 Spray(s) Both Nostrils four times a day  sodium chloride 3%  Inhalation 3 milliLiter(s) Inhalation every 6 hours  sulfaSALAzine 500 milliGRAM(s) Oral two times a day  tiotropium 18 MICROgram(s) Capsule 1 Capsule(s) Inhalation daily  traMADol 100 milliGRAM(s) Oral three times a day    MEDICATIONS  (PRN):  bisacodyl Suppository 10 milliGRAM(s) Rectal daily PRN Constipation  dextrose 40% Gel 15 Gram(s) Oral once PRN Blood Glucose LESS THAN 70 milliGRAM(s)/deciliter  Diclofenac Topical Gel 1% 1 Application(s) 1 Application(s) Topical two times a day PRN pain  glucagon  Injectable 1 milliGRAM(s) IntraMuscular once PRN Glucose LESS THAN 70 milligrams/deciliter  simethicone 80 milliGRAM(s) Chew every 8 hours PRN Gas      LABS:                    MICROBIOLOGY:     RADIOLOGY:  [ ] Reviewed and interpreted by me    PULMONARY FUNCTION TESTS:    EKG: CHIEF COMPLAINT: no new complaints    Interval Events: no events overnight    REVIEW OF SYSTEMS:  Constitutional:   Eyes:  ENT:  CV: denies chest pain  Resp: denies shortness of breath  GI: denies abd pain  :  MSK:  Integumentary:  Neurological:  Psychiatric:  Endocrine:  Hematologic/Lymphatic:  Allergic/Immunologic:  [x ] All other systems negative  [ ] Unable to assess ROS because ________    OBJECTIVE:  ICU Vital Signs Last 24 Hrs  T(C): 37.1 (31 May 2018 05:49), Max: 37.1 (30 May 2018 22:05)  T(F): 98.8 (31 May 2018 05:49), Max: 98.8 (30 May 2018 22:05)  HR: 91 (31 May 2018 07:55) (65 - 93)  BP: 122/65 (31 May 2018 05:49) (117/74 - 125/85)  BP(mean): --  ABP: --  ABP(mean): --  RR: 18 (31 May 2018 05:49) (18 - 19)  SpO2: 98% (31 May 2018 07:55) (98% - 100%)        05-30 @ 07:01  -  05-31 @ 07:00  --------------------------------------------------------  IN: 2000 mL / OUT: 0 mL / NET: 2000 mL      CAPILLARY BLOOD GLUCOSE      POCT Blood Glucose.: 208 mg/dL (31 May 2018 07:50)      HOSPITAL MEDICATIONS:  MEDICATIONS  (STANDING):  acetaminophen   Tablet. 650 milliGRAM(s) Oral every 6 hours  ALBUTerol    90 MICROgram(s) HFA Inhaler 1 Puff(s) Inhalation every 4 hours  amLODIPine   Tablet 5 milliGRAM(s) Oral daily  buDESOnide 160 MICROgram(s)/formoterol 4.5 MICROgram(s) Inhaler 2 Puff(s) Inhalation two times a day  chlorhexidine 4% Liquid 1 Application(s) Topical <User Schedule>  clopidogrel Tablet 75 milliGRAM(s) Oral daily  dextrose 5%. 1000 milliLiter(s) (50 mL/Hr) IV Continuous <Continuous>  dextrose 50% Injectable 12.5 Gram(s) IV Push once  dextrose 50% Injectable 25 Gram(s) IV Push once  dextrose 50% Injectable 25 Gram(s) IV Push once  docusate sodium 100 milliGRAM(s) Oral three times a day  fluticasone propionate 50 MICROgram(s)/spray Nasal Spray 1 Spray(s) Both Nostrils two times a day  furosemide    Tablet 20 milliGRAM(s) Oral at bedtime  furosemide    Tablet 40 milliGRAM(s) Oral daily  gabapentin 300 milliGRAM(s) Oral two times a day  guaiFENesin  milliGRAM(s) Oral every 12 hours  heparin  Injectable 7500 Unit(s) SubCutaneous every 8 hours  insulin glargine Injectable (LANTUS) 18 Unit(s) SubCutaneous at bedtime  insulin lispro (HumaLOG) corrective regimen sliding scale   SubCutaneous three times a day before meals  insulin lispro (HumaLOG) corrective regimen sliding scale   SubCutaneous at bedtime  insulin lispro Injectable (HumaLOG) 8 Unit(s) SubCutaneous before lunch  insulin lispro Injectable (HumaLOG) 8 Unit(s) SubCutaneous with dinner  insulin lispro Injectable (HumaLOG) 8 Unit(s) SubCutaneous before breakfast  ketotifen 0.025% Ophthalmic Solution 1 Drop(s) Both EYES two times a day  levothyroxine 125 MICROGram(s) Oral daily  loratadine 10 milliGRAM(s) Oral daily  metoprolol succinate ER 50 milliGRAM(s) Oral daily  oxybutynin 5 milliGRAM(s) Oral two times a day  pantoprazole    Tablet 40 milliGRAM(s) Oral before breakfast  polyethylene glycol 3350 17 Gram(s) Oral daily  predniSONE   Tablet 10 milliGRAM(s) Oral daily  senna 2 Tablet(s) Oral at bedtime  simvastatin 20 milliGRAM(s) Oral at bedtime  sodium chloride 0.65% Nasal 1 Spray(s) Both Nostrils four times a day  sodium chloride 3%  Inhalation 3 milliLiter(s) Inhalation every 6 hours  sulfaSALAzine 500 milliGRAM(s) Oral two times a day  tiotropium 18 MICROgram(s) Capsule 1 Capsule(s) Inhalation daily  traMADol 100 milliGRAM(s) Oral three times a day    MEDICATIONS  (PRN):  bisacodyl Suppository 10 milliGRAM(s) Rectal daily PRN Constipation  dextrose 40% Gel 15 Gram(s) Oral once PRN Blood Glucose LESS THAN 70 milliGRAM(s)/deciliter  Diclofenac Topical Gel 1% 1 Application(s) 1 Application(s) Topical two times a day PRN pain  glucagon  Injectable 1 milliGRAM(s) IntraMuscular once PRN Glucose LESS THAN 70 milligrams/deciliter  simethicone 80 milliGRAM(s) Chew every 8 hours PRN Gas      LABS:                    MICROBIOLOGY:     RADIOLOGY:  [ ] Reviewed and interpreted by me    PULMONARY FUNCTION TESTS:    EKG:

## 2018-05-31 NOTE — PROGRESS NOTE ADULT - PROBLEM SELECTOR PROBLEM 2
Adult Hypothyroidism
Pulmonary hypertension

## 2018-05-31 NOTE — PROGRESS NOTE ADULT - PROBLEM SELECTOR PLAN 3
- on chronic steroids, 10mg at home (continue this dose)  - cont pain medications as needed  - pt has outpt rheumatologist who gives orencia (has been off for about 2 months 2/2 being on antibiotics). f/u with rheum on d/  - Continue with home dose of sulfasalazine

## 2018-05-31 NOTE — PROGRESS NOTE ADULT - PROBLEM SELECTOR PLAN 5
- with a1c of 10.3  - pt is only on oral medications at home  - will start lantus and humalog premeals with moderate ISS  - endo consulted for medication regimen establishment as pt states she will have difficulty drawing insulin and injecting herself 2/2 RA (son is moving out of state)   - endocrine appointments arranged for outpatient follow up  - DM nurse educated patient and will follow up with diabetes educator on d/c  - continue to monitor FSs.
- with a1c of 10.3  - pt is only on oral medications at home  - will start lantus and humalog premeals with moderate ISS  - will place endo consult for medication regimen establishment as pt states she will have difficulty drawing insulin and injecting herself 2/2 RA - she states that her son will have to inject her, but he is not home all day for 4x daily injections (may need to consider NPH for BID dosing)  - will need all insulin supplies and new glucometer for discharge  - monitor FSs
- TSH 33.82  - increase synthroid to 152mcg  - repeat TFTs in 4-6 weeks
- with a1c of 10.3  - pt is only on oral medications at home  - will start lantus and humalog premeals with moderate ISS  -  endo consult for medication regimen establishment as pt states she will have difficulty drawing insulin and injecting herself 2/2 RA -son is moving out of state -Endo will send DM nurse educator to eval patient  -consider NPH for BID dosing)  - will need all insulin supplies and new glucometer for discharge  - monitor FSs
- with a1c of 10.3  - pt is only on oral medications at home  - will start lantus and humalog premeals with moderate ISS  -  endo consult for medication regimen establishment as pt states she will have difficulty drawing insulin and injecting herself 2/2 RA -son is moving out of state -Endo will send DM nurse educator to eval patient  -consider NPH for BID dosing)  - will need all insulin supplies and new glucometer for discharge  - monitor FSs

## 2018-05-31 NOTE — PROGRESS NOTE ADULT - GASTROINTESTINAL DETAILS
nontender/normal/soft
soft/normal/nontender
nontender/no distention/soft
nontender/soft
soft/nontender/no distention
nontender/normal/soft
soft/nontender

## 2018-05-31 NOTE — PROGRESS NOTE ADULT - PROBLEM SELECTOR PROBLEM 1
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
Chronic obstructive pulmonary disease with acute lower respiratory infection
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
Chronic obstructive pulmonary disease with acute lower respiratory infection

## 2018-05-31 NOTE — PROGRESS NOTE ADULT - EXTREMITIES
detailed exam
No cyanosis, clubbing or edema
detailed exam

## 2018-05-31 NOTE — PROGRESS NOTE ADULT - ATTENDING COMMENTS
stable for d/c today    pt needs a follow CT from september, she has difficulty getting to appointments, will have it done here prior to d/c    outpt follow up arranged with me, endocrine, and nutrition    home bipap set up. has home O2

## 2018-05-31 NOTE — PROGRESS NOTE ADULT - RS GEN PE MLT RESP DETAILS PC
breath sounds equal/no wheezes/airway patent
airway patent/breath sounds equal/clear to auscultation bilaterally
breath sounds equal/airway patent/diminished breath sounds, L/diminished breath sounds, R/normal
mild exp wheeze + loose cough non productive/breath sounds equal/airway patent
respirations non-labored/Expiratory wheeze; occasional cough/airway patent/breath sounds equal/good air movement
on home O2/clear to auscultation bilaterally/airway patent/breath sounds equal/respirations non-labored/good air movement
airway patent/breath sounds equal

## 2018-05-31 NOTE — PROGRESS NOTE ADULT - ASSESSMENT
Assessment:  63 y/o F with COPD, pulmonary HTN, RA on prednisone, TIA, HTN p/w dyspnea fever and mucus production.

## 2018-05-31 NOTE — PROGRESS NOTE ADULT - NEUROLOGICAL
detailed exam
Alert & oriented; no sensory, motor or coordination deficits, normal reflexes
detailed exam
detailed exam

## 2018-05-31 NOTE — PROGRESS NOTE ADULT - CVS HE PE MLT D E PC
regular rate and rhythm
regular rate and rhythm
no murmur/no rub/regular rate and rhythm
regular rate and rhythm
no rub/regular rate and rhythm/no murmur

## 2018-05-31 NOTE — PROGRESS NOTE ADULT - PROBLEM SELECTOR PROBLEM 5
Type 2 diabetes mellitus
Adult Hypothyroidism
Type 2 diabetes mellitus

## 2018-05-31 NOTE — PROGRESS NOTE ADULT - PROVIDER SPECIALTY LIST ADULT
Endocrinology
Pulmonology
Endocrinology

## 2018-05-31 NOTE — PROGRESS NOTE ADULT - PROBLEM SELECTOR PROBLEM 4
Essential (primary) hypertension

## 2018-05-31 NOTE — PROGRESS NOTE ADULT - CONSTITUTIONAL DETAILS
no distress
no distress/well-developed/well-nourished/well-groomed
well-nourished/respiratory distress
well-developed/well-groomed/no distress
no distress

## 2018-05-31 NOTE — PROGRESS NOTE ADULT - PROBLEM SELECTOR PLAN 1
Prednisone is back down to 10mg po qdaily as of tomorrow. Adjust as thus:  Humalog to 8/8/8  Lantus 18u qhs  continue moderate scales    DC plan:  Tresiba 18 units sq qhs and sitagliptan 50mg po qdaily. Spoke with KEELEY Uribe about sending this to her local pharmacy so we can see what is covered.   Pt's diet is to blame for her eleavted bs.
Prednisone is back down to 10mg po qdaily   Would continue with current insulin plan:  Humalog 8/8/8  Lantus 18u qhs  continue moderate scales    DC plan:  Tresiba (pen) 18 units sq qhs and sitagliptan 50mg po qdaily.  Autoshield pen needles.
While inpatient:  Increase Humalog to 8/8/8  Continue Lantus 18u qhs  continue moderate scales    DC plan:  basal bolus insulin TBD with autoshield pen needle. Per CDE prefer for basal Tresiba or Lantus and prefer for premeal Novolog or Apidra since these pens are easier to push.
- with exacerbation  - cont nebs  - cont levaquin  - increase steroids to 20mg  - supplemental oxygen as needed
Adjust her insulin as thus: Lantus 20 units sq qhs and humalog 10/8/8 with B/L/D  continue moderate scale insulin tid-ac/qhs    DC plan per Mon-Fri DM team:  basal bolus insulin dose TBD with autoshield pen needle. Per CDE prefer for basal Tresiba or Lantus and prefer for premeal Novolog or Apidra since these pens are easier to push.
- with exacerbation  - cont nebs  - cont levaquin x 5 days total (through 5/26)  - increase steroids to 20mg  - supplemental oxygen as needed
Plan:   - with exacerbation  - c/w saline nebs   - completed course of abx  - on chronic steroids  - supplemental oxygen as needed  -overnight pulse oximetry reviewed - bipap for home upon d/c  - plan for discharge today
- with exacerbation  - cont nebs  - cont levaquin x 5 days total (through 5/26)  - increase steroids to 20mg  - supplemental oxygen as needed
- with exacerbation  - cont nebs  - cont levaquin x 5 days total (through 5/26)  - increase steroids to 20mg  - supplemental oxygen as needed

## 2018-05-31 NOTE — PROGRESS NOTE ADULT - PROBLEM SELECTOR PROBLEM 3
Rheumatoid arthritis, involving unspecified site, unspecified rheumatoid factor presence
Rheumatoid arthritis, involving unspecified site, unspecified rheumatoid factor presence
RA (Rheumatoid Arthritis)
Rheumatoid arthritis, involving unspecified site, unspecified rheumatoid factor presence
Rheumatoid arthritis, involving unspecified site, unspecified rheumatoid factor presence

## 2018-06-11 ENCOUNTER — APPOINTMENT (OUTPATIENT)
Dept: PULMONOLOGY | Facility: CLINIC | Age: 63
End: 2018-06-11

## 2018-06-13 ENCOUNTER — APPOINTMENT (OUTPATIENT)
Dept: PULMONOLOGY | Facility: CLINIC | Age: 63
End: 2018-06-13

## 2018-06-13 ENCOUNTER — APPOINTMENT (OUTPATIENT)
Dept: PULMONOLOGY | Facility: CLINIC | Age: 63
End: 2018-06-13
Payer: MEDICARE

## 2018-06-13 ENCOUNTER — APPOINTMENT (OUTPATIENT)
Dept: ENDOCRINOLOGY | Facility: CLINIC | Age: 63
End: 2018-06-13
Payer: MEDICARE

## 2018-06-13 VITALS
RESPIRATION RATE: 16 BRPM | WEIGHT: 210 LBS | HEART RATE: 85 BPM | SYSTOLIC BLOOD PRESSURE: 119 MMHG | HEIGHT: 61 IN | OXYGEN SATURATION: 94 % | DIASTOLIC BLOOD PRESSURE: 80 MMHG | BODY MASS INDEX: 39.65 KG/M2 | TEMPERATURE: 97.8 F

## 2018-06-13 PROCEDURE — 95250 CONT GLUC MNTR PHYS/QHP EQP: CPT

## 2018-06-13 PROCEDURE — 99215 OFFICE O/P EST HI 40 MIN: CPT

## 2018-06-13 PROCEDURE — 95251 CONT GLUC MNTR ANALYSIS I&R: CPT

## 2018-06-13 PROCEDURE — G0108 DIAB MANAGE TRN  PER INDIV: CPT

## 2018-06-13 RX ORDER — PREDNISONE 20 MG/1
20 TABLET ORAL
Refills: 0 | Status: DISCONTINUED | COMMUNITY
End: 2018-06-13

## 2018-06-13 RX ORDER — BUDESONIDE AND FORMOTEROL FUMARATE DIHYDRATE 80; 4.5 UG/1; UG/1
80-4.5 AEROSOL RESPIRATORY (INHALATION)
Qty: 1 | Refills: 5 | Status: ACTIVE | COMMUNITY
Start: 2018-06-13 | End: 1900-01-01

## 2018-06-13 RX ORDER — TIOTROPIUM BROMIDE 18 UG/1
CAPSULE ORAL; RESPIRATORY (INHALATION)
Refills: 0 | Status: DISCONTINUED | COMMUNITY
End: 2018-06-13

## 2018-06-26 ENCOUNTER — APPOINTMENT (OUTPATIENT)
Dept: PULMONOLOGY | Facility: CLINIC | Age: 63
End: 2018-06-26

## 2018-06-28 DIAGNOSIS — J96.12 CHRONIC RESPIRATORY FAILURE WITH HYPERCAPNIA: ICD-10-CM

## 2018-07-03 ENCOUNTER — APPOINTMENT (OUTPATIENT)
Dept: ENDOCRINOLOGY | Facility: CLINIC | Age: 63
End: 2018-07-03

## 2018-07-06 ENCOUNTER — APPOINTMENT (OUTPATIENT)
Dept: PULMONOLOGY | Facility: CLINIC | Age: 63
End: 2018-07-06

## 2018-07-13 ENCOUNTER — CLINICAL ADVICE (OUTPATIENT)
Age: 63
End: 2018-07-13

## 2018-07-16 RX ORDER — INSULIN GLARGINE 100 [IU]/ML
INJECTION, SOLUTION SUBCUTANEOUS
Refills: 0 | Status: DISCONTINUED | COMMUNITY
End: 2018-07-16

## 2018-08-22 ENCOUNTER — APPOINTMENT (OUTPATIENT)
Dept: SLEEP CENTER | Facility: CLINIC | Age: 63
End: 2018-08-22

## 2018-09-06 NOTE — ED ADULT NURSE NOTE - CHPI ED SYMPTOMS NEG
Subjective:      Patient ID: Viraj Olsen is a 67 y.o. male.    Chief Complaint: Pain of the Right Knee    HPI     They have experienced problems with their right knee over the past 3 weeks. The patient denies relevant history of injury/aggravation. Pain is located laterally Associated symptoms include NA.  Symptoms are aggravated by   No particular activity. They have been treated with over the counter analgesics.   Symptoms have recently stayed the same. Ambulation reportedly has not been impaired. Self care ADLs are not painful.     Review of Systems   Constitution: Negative for fever and weight loss.   HENT: Negative for congestion.    Eyes: Negative for visual disturbance.   Cardiovascular: Negative for chest pain.   Respiratory: Negative for shortness of breath.    Hematologic/Lymphatic: Negative for bleeding problem. Does not bruise/bleed easily.   Skin: Negative for poor wound healing.   Musculoskeletal: Positive for joint pain.   Gastrointestinal: Negative for abdominal pain.   Genitourinary: Negative for dysuria.   Neurological: Negative for seizures.   Psychiatric/Behavioral: Negative for altered mental status.   Allergic/Immunologic: Negative for persistent infections.         Objective:            Ortho/SPM Exam     right knee     The patient is not in acute distress.   Body habitus is normal.   The patient walks without a limp.  Resisted SLR negative.   The skin over the knee is intact.  Knee effusion 0  Tendernes is located  Laterally over the IT band  Range of motion- Flexion full, Extension full.   Ligament exam:   MCL intact   Lachman intact              Post sag intact    LCL intact  Patellar apprehension negative.  Popliteal cyst negative  Patellar crepitation absent.  Flexion/pinch negative.  Pulses DP present, PT present.  Motor normal 5/5 strength in all tested muscle groups.   Sensory normal.     I reviewed the relevant radiographic images for the patient's condition: today's films of the  right knee are normal for the patient's age        Assessment:       Encounter Diagnosis   Name Primary?    It band syndrome, left Yes          Plan:       Viraj was seen today for pain.    Diagnoses and all orders for this visit:    It band syndrome, left         I explained my diagnostic impression and the reasoning behind it in detail, using layman's terms.       Injection recommended and consent given           no abdominal distension/no blood in stool/no dysuria/no burning urination/no vomiting/no chills/no hematuria

## 2018-10-23 ENCOUNTER — RX RENEWAL (OUTPATIENT)
Age: 63
End: 2018-10-23

## 2018-10-30 ENCOUNTER — APPOINTMENT (OUTPATIENT)
Dept: ENDOCRINOLOGY | Facility: CLINIC | Age: 63
End: 2018-10-30
Payer: MEDICARE

## 2018-10-31 ENCOUNTER — APPOINTMENT (OUTPATIENT)
Dept: ENDOCRINOLOGY | Facility: CLINIC | Age: 63
End: 2018-10-31
Payer: MEDICARE

## 2018-10-31 VITALS
SYSTOLIC BLOOD PRESSURE: 122 MMHG | HEIGHT: 61 IN | WEIGHT: 210 LBS | DIASTOLIC BLOOD PRESSURE: 80 MMHG | BODY MASS INDEX: 39.65 KG/M2

## 2018-10-31 DIAGNOSIS — Z83.3 FAMILY HISTORY OF DIABETES MELLITUS: ICD-10-CM

## 2018-10-31 DIAGNOSIS — I10 ESSENTIAL (PRIMARY) HYPERTENSION: ICD-10-CM

## 2018-10-31 DIAGNOSIS — Z86.39 PERSONAL HISTORY OF OTHER ENDOCRINE, NUTRITIONAL AND METABOLIC DISEASE: ICD-10-CM

## 2018-10-31 DIAGNOSIS — E78.5 HYPERLIPIDEMIA, UNSPECIFIED: ICD-10-CM

## 2018-10-31 DIAGNOSIS — M81.0 AGE-RELATED OSTEOPOROSIS W/OUT CURRENT PATHOLOGICAL FRACTURE: ICD-10-CM

## 2018-10-31 DIAGNOSIS — Z87.39 PERSONAL HISTORY OF OTHER DISEASES OF THE MUSCULOSKELETAL SYSTEM AND CONNECTIVE TISSUE: ICD-10-CM

## 2018-10-31 DIAGNOSIS — E11.65 TYPE 2 DIABETES MELLITUS WITH HYPERGLYCEMIA: ICD-10-CM

## 2018-10-31 LAB
GLUCOSE BLDC GLUCOMTR-MCNC: 192
HBA1C MFR BLD HPLC: 8.1

## 2018-10-31 PROCEDURE — 99215 OFFICE O/P EST HI 40 MIN: CPT

## 2018-10-31 RX ORDER — INSULIN DEGLUDEC INJECTION 100 U/ML
100 INJECTION, SOLUTION SUBCUTANEOUS EVERY MORNING
Qty: 1 | Refills: 2 | Status: ACTIVE | COMMUNITY
Start: 2018-10-31 | End: 1900-01-01

## 2018-10-31 RX ORDER — REPAGLINIDE 1 MG/1
1 TABLET ORAL
Qty: 90 | Refills: 1 | Status: ACTIVE | COMMUNITY
Start: 2018-10-31 | End: 1900-01-01

## 2018-10-31 RX ORDER — INSULIN HUMAN 100 [IU]/ML
100 INJECTION, SUSPENSION SUBCUTANEOUS
Qty: 1 | Refills: 0 | Status: COMPLETED | COMMUNITY
Start: 2018-07-16 | End: 2018-10-31

## 2018-11-01 PROBLEM — Z86.39 HISTORY OF DIABETES MELLITUS: Status: RESOLVED | Noted: 2017-10-03 | Resolved: 2018-11-01

## 2018-11-01 PROBLEM — Z83.3 FAMILY HISTORY OF TYPE 2 DIABETES MELLITUS: Status: ACTIVE | Noted: 2018-11-01

## 2018-11-01 PROBLEM — E78.5 HYPERLIPIDEMIA, UNSPECIFIED HYPERLIPIDEMIA TYPE: Status: ACTIVE | Noted: 2017-10-03

## 2018-11-01 PROBLEM — Z86.39 HISTORY OF HYPOTHYROIDISM: Status: RESOLVED | Noted: 2017-10-03 | Resolved: 2018-11-01

## 2018-11-01 PROBLEM — I10 BENIGN ESSENTIAL HYPERTENSION: Status: ACTIVE | Noted: 2017-10-03

## 2018-11-01 PROBLEM — M81.0 OSTEOPOROSIS: Status: ACTIVE | Noted: 2018-10-31

## 2018-11-01 PROBLEM — E11.65 DIABETES MELLITUS TYPE II, UNCONTROLLED: Status: ACTIVE | Noted: 2018-07-16

## 2018-11-01 PROBLEM — Z87.39 HISTORY OF RHEUMATOID ARTHRITIS: Status: RESOLVED | Noted: 2017-10-03 | Resolved: 2018-11-01

## 2018-11-01 LAB
T4 FREE SERPL-MCNC: 1 NG/DL
TSH SERPL-ACNC: 23.26 UIU/ML

## 2018-11-01 RX ORDER — AZITHROMYCIN 250 MG/1
250 TABLET, FILM COATED ORAL
Qty: 6 | Refills: 0 | Status: COMPLETED | COMMUNITY
Start: 2018-08-21

## 2018-11-01 RX ORDER — SITAGLIPTIN 50 MG/1
50 TABLET, FILM COATED ORAL
Qty: 90 | Refills: 0 | Status: COMPLETED | COMMUNITY
Start: 2018-06-28

## 2018-11-01 RX ORDER — FLUTICASONE PROPIONATE 50 UG/1
50 SPRAY, METERED NASAL
Qty: 16 | Refills: 0 | Status: ACTIVE | COMMUNITY
Start: 2018-10-17

## 2018-11-01 RX ORDER — DOXYCYCLINE HYCLATE 100 MG/1
100 CAPSULE ORAL
Qty: 20 | Refills: 0 | Status: ACTIVE | COMMUNITY
Start: 2018-10-17

## 2018-11-01 RX ORDER — BLOOD SUGAR DIAGNOSTIC
STRIP MISCELLANEOUS
Qty: 100 | Refills: 0 | Status: ACTIVE | COMMUNITY
Start: 2018-09-17

## 2018-11-01 RX ORDER — INSULIN DETEMIR 100 [IU]/ML
100 INJECTION, SOLUTION SUBCUTANEOUS
Qty: 18 | Refills: 0 | Status: COMPLETED | COMMUNITY
Start: 2018-08-10

## 2018-11-01 RX ORDER — PREDNISONE 10 MG/1
10 TABLET ORAL
Qty: 60 | Refills: 0 | Status: ACTIVE | COMMUNITY
Start: 2018-10-13

## 2018-11-01 RX ORDER — PANTOPRAZOLE 40 MG/1
40 TABLET, DELAYED RELEASE ORAL
Qty: 90 | Refills: 0 | Status: ACTIVE | COMMUNITY
Start: 2018-06-27

## 2018-11-01 RX ORDER — AZELASTINE HYDROCHLORIDE 0.5 MG/ML
0.05 SOLUTION/ DROPS OPHTHALMIC
Qty: 6 | Refills: 0 | Status: COMPLETED | COMMUNITY
Start: 2018-02-01

## 2018-11-01 RX ORDER — CEPHALEXIN 500 MG/1
500 CAPSULE ORAL
Qty: 21 | Refills: 0 | Status: COMPLETED | COMMUNITY
Start: 2018-08-07

## 2018-11-01 RX ORDER — BLOOD-GLUCOSE METER
KIT MISCELLANEOUS
Qty: 1 | Refills: 0 | Status: ACTIVE | COMMUNITY
Start: 2018-05-29

## 2018-11-01 RX ORDER — ALBUTEROL SULFATE 90 UG/1
108 (90 BASE) AEROSOL, METERED RESPIRATORY (INHALATION)
Qty: 18 | Refills: 0 | Status: ACTIVE | COMMUNITY
Start: 2018-05-31

## 2018-11-01 RX ORDER — LEVOTHYROXINE SODIUM 0.1 MG/1
100 TABLET ORAL
Qty: 90 | Refills: 0 | Status: COMPLETED | COMMUNITY
Start: 2018-09-26

## 2018-11-01 RX ORDER — METOPROLOL SUCCINATE 50 MG/1
50 TABLET, EXTENDED RELEASE ORAL
Qty: 90 | Refills: 0 | Status: ACTIVE | COMMUNITY
Start: 2018-01-15

## 2018-11-01 RX ORDER — DICLOFENAC SODIUM 10 MG/G
1 GEL TOPICAL
Qty: 100 | Refills: 0 | Status: COMPLETED | COMMUNITY
Start: 2017-11-03

## 2018-11-01 RX ORDER — ALENDRONATE SODIUM 35 MG/1
35 TABLET ORAL
Qty: 12 | Refills: 0 | Status: ACTIVE | COMMUNITY
Start: 2017-12-06

## 2018-11-01 RX ORDER — TOBRAMYCIN 3 MG/ML
0.3 SOLUTION/ DROPS OPHTHALMIC
Qty: 5 | Refills: 0 | Status: ACTIVE | COMMUNITY
Start: 2018-08-21

## 2018-11-01 RX ORDER — PEN NEEDLE, DIABETIC 29 G X1/2"
31G X 5 MM NEEDLE, DISPOSABLE MISCELLANEOUS
Qty: 100 | Refills: 0 | Status: ACTIVE | COMMUNITY
Start: 2018-08-10

## 2018-11-01 RX ORDER — CLOPIDOGREL BISULFATE 75 MG/1
75 TABLET, FILM COATED ORAL
Qty: 90 | Refills: 0 | Status: ACTIVE | COMMUNITY
Start: 2018-05-09

## 2018-11-01 RX ORDER — TRAMADOL HYDROCHLORIDE 300 MG/1
300 TABLET, EXTENDED RELEASE ORAL
Qty: 30 | Refills: 0 | Status: ACTIVE | COMMUNITY
Start: 2018-05-21

## 2018-11-01 RX ORDER — PREDNISONE 5 MG/1
5 TABLET ORAL
Qty: 120 | Refills: 0 | Status: ACTIVE | COMMUNITY
Start: 2018-05-01

## 2018-11-01 RX ORDER — LANCETS 28 GAUGE
EACH MISCELLANEOUS
Qty: 100 | Refills: 0 | Status: ACTIVE | COMMUNITY
Start: 2018-05-29

## 2018-11-02 LAB
ALBUMIN SERPL ELPH-MCNC: 4.1 G/DL
ALP BLD-CCNC: 74 U/L
ALT SERPL-CCNC: 15 U/L
ANION GAP SERPL CALC-SCNC: 24 MMOL/L
AST SERPL-CCNC: 15 U/L
BILIRUB SERPL-MCNC: <0.2 MG/DL
BUN SERPL-MCNC: 31 MG/DL
CALCIUM SERPL-MCNC: 9.4 MG/DL
CHLORIDE SERPL-SCNC: 100 MMOL/L
CO2 SERPL-SCNC: 20 MMOL/L
CREAT SERPL-MCNC: 1.34 MG/DL
GLUCOSE SERPL-MCNC: 184 MG/DL
POTASSIUM SERPL-SCNC: 4.3 MMOL/L
PROT SERPL-MCNC: 6.7 G/DL
SODIUM SERPL-SCNC: 144 MMOL/L

## 2018-11-05 RX ORDER — FLUTICASONE PROPIONATE AND SALMETEROL 50; 250 UG/1; UG/1
250-50 POWDER RESPIRATORY (INHALATION) TWICE DAILY
Refills: 0 | Status: ACTIVE | COMMUNITY
Start: 2018-05-30

## 2018-11-05 RX ORDER — NAPROXEN 500 MG/1
500 TABLET ORAL
Refills: 0 | Status: ACTIVE | COMMUNITY
Start: 2018-09-26

## 2018-11-05 RX ORDER — LEVOTHYROXINE SODIUM 0.1 MG/1
100 TABLET ORAL
Qty: 90 | Refills: 1 | Status: ACTIVE | COMMUNITY
Start: 2018-06-27

## 2018-11-05 RX ORDER — SULFASALAZINE 500 MG/1
500 TABLET ORAL
Refills: 0 | Status: ACTIVE | COMMUNITY
Start: 2018-06-19

## 2018-11-05 RX ORDER — INSULIN DEGLUDEC INJECTION 100 U/ML
100 INJECTION, SOLUTION SUBCUTANEOUS
Qty: 15 | Refills: 0 | Status: DISCONTINUED | COMMUNITY
Start: 2018-05-28 | End: 2018-11-05

## 2018-11-05 RX ORDER — ATORVASTATIN CALCIUM 20 MG/1
20 TABLET, FILM COATED ORAL
Qty: 90 | Refills: 0 | Status: DISCONTINUED | COMMUNITY
Start: 2018-10-31 | End: 2018-11-05

## 2018-11-05 RX ORDER — METOPROLOL SUCCINATE 25 MG/1
25 TABLET, EXTENDED RELEASE ORAL
Qty: 90 | Refills: 0 | Status: DISCONTINUED | COMMUNITY
Start: 2018-07-23 | End: 2018-11-05

## 2018-11-05 RX ORDER — CLOPIDOGREL 75 MG/1
TABLET, FILM COATED ORAL
Refills: 0 | Status: DISCONTINUED | COMMUNITY
End: 2018-11-05

## 2018-11-05 RX ORDER — METOPROLOL TARTRATE 50 MG/1
TABLET ORAL
Refills: 0 | Status: DISCONTINUED | COMMUNITY
End: 2018-11-05

## 2018-11-05 RX ORDER — FUROSEMIDE 40 MG/1
40 TABLET ORAL TWICE DAILY
Qty: 60 | Refills: 1 | Status: DISCONTINUED | COMMUNITY
End: 2018-11-05

## 2018-11-05 RX ORDER — AMLODIPINE BESYLATE 5 MG/1
5 TABLET ORAL DAILY
Refills: 0 | Status: ACTIVE | COMMUNITY

## 2018-11-05 RX ORDER — OXYBUTYNIN CHLORIDE 5 MG/1
5 TABLET ORAL
Refills: 0 | Status: ACTIVE | COMMUNITY
Start: 2017-10-11

## 2018-11-05 RX ORDER — ATORVASTATIN CALCIUM 20 MG/1
20 TABLET, FILM COATED ORAL
Qty: 90 | Refills: 0 | Status: DISCONTINUED | COMMUNITY
Start: 2017-11-03 | End: 2018-11-05

## 2018-11-05 RX ORDER — FUROSEMIDE 40 MG/1
40 TABLET ORAL
Refills: 0 | Status: ACTIVE | COMMUNITY
Start: 2018-05-31

## 2018-11-05 RX ORDER — MUPIROCIN 2 G/100G
2 CREAM TOPICAL
Qty: 30 | Refills: 0 | Status: DISCONTINUED | COMMUNITY
Start: 2018-06-27 | End: 2018-11-05

## 2018-11-05 RX ORDER — LEVOTHYROXINE SODIUM 137 UG/1
TABLET ORAL
Refills: 0 | Status: DISCONTINUED | COMMUNITY
End: 2018-11-05

## 2018-11-05 RX ORDER — SULFAMETHOXAZOLE AND TRIMETHOPRIM 400; 80 MG/1; MG/1
400-80 TABLET ORAL
Qty: 18 | Refills: 0 | Status: DISCONTINUED | COMMUNITY
Start: 2017-09-01 | End: 2018-11-05

## 2018-11-05 RX ORDER — GABAPENTIN 300 MG/1
300 CAPSULE ORAL 3 TIMES DAILY
Refills: 0 | Status: ACTIVE | COMMUNITY
Start: 2018-11-05

## 2018-12-15 ENCOUNTER — INPATIENT (INPATIENT)
Facility: HOSPITAL | Age: 63
LOS: 94 days | Discharge: NURSING, REHAB OR LONG-TERM CARE | End: 2019-03-20
Attending: INTERNAL MEDICINE | Admitting: SURGERY
Payer: MEDICARE

## 2018-12-15 ENCOUNTER — TRANSCRIPTION ENCOUNTER (OUTPATIENT)
Age: 63
End: 2018-12-15

## 2018-12-15 VITALS
RESPIRATION RATE: 16 BRPM | DIASTOLIC BLOOD PRESSURE: 89 MMHG | HEART RATE: 99 BPM | TEMPERATURE: 99 F | OXYGEN SATURATION: 95 % | SYSTOLIC BLOOD PRESSURE: 135 MMHG

## 2018-12-15 DIAGNOSIS — K56.609 UNSPECIFIED INTESTINAL OBSTRUCTION, UNSPECIFIED AS TO PARTIAL VERSUS COMPLETE OBSTRUCTION: ICD-10-CM

## 2018-12-15 LAB
ALBUMIN SERPL ELPH-MCNC: 3.8 G/DL — SIGNIFICANT CHANGE UP (ref 3.3–5)
ALP SERPL-CCNC: 68 U/L — SIGNIFICANT CHANGE UP (ref 40–120)
ALT FLD-CCNC: 15 U/L — SIGNIFICANT CHANGE UP (ref 4–33)
APTT BLD: 24.9 SEC — LOW (ref 27.5–36.3)
AST SERPL-CCNC: 35 U/L — HIGH (ref 4–32)
BASE EXCESS BLDV CALC-SCNC: 1.4 MMOL/L — SIGNIFICANT CHANGE UP
BASOPHILS # BLD AUTO: 0.02 K/UL — SIGNIFICANT CHANGE UP (ref 0–0.2)
BASOPHILS # BLD AUTO: 0.04 K/UL — SIGNIFICANT CHANGE UP (ref 0–0.2)
BASOPHILS NFR BLD AUTO: 0.2 % — SIGNIFICANT CHANGE UP (ref 0–2)
BASOPHILS NFR BLD AUTO: 0.4 % — SIGNIFICANT CHANGE UP (ref 0–2)
BILIRUB SERPL-MCNC: < 0.2 MG/DL — LOW (ref 0.2–1.2)
BLD GP AB SCN SERPL QL: NEGATIVE — SIGNIFICANT CHANGE UP
BLOOD GAS VENOUS - CREATININE: 0.83 MG/DL — SIGNIFICANT CHANGE UP (ref 0.5–1.3)
BUN SERPL-MCNC: 22 MG/DL — SIGNIFICANT CHANGE UP (ref 7–23)
BUN SERPL-MCNC: 24 MG/DL — HIGH (ref 7–23)
CALCIUM SERPL-MCNC: 10.2 MG/DL — SIGNIFICANT CHANGE UP (ref 8.4–10.5)
CALCIUM SERPL-MCNC: 9.4 MG/DL — SIGNIFICANT CHANGE UP (ref 8.4–10.5)
CHLORIDE BLDV-SCNC: 111 MMOL/L — HIGH (ref 96–108)
CHLORIDE SERPL-SCNC: 102 MMOL/L — SIGNIFICANT CHANGE UP (ref 98–107)
CHLORIDE SERPL-SCNC: 103 MMOL/L — SIGNIFICANT CHANGE UP (ref 98–107)
CO2 SERPL-SCNC: 23 MMOL/L — SIGNIFICANT CHANGE UP (ref 22–31)
CO2 SERPL-SCNC: 24 MMOL/L — SIGNIFICANT CHANGE UP (ref 22–31)
CREAT SERPL-MCNC: 1 MG/DL — SIGNIFICANT CHANGE UP (ref 0.5–1.3)
CREAT SERPL-MCNC: 1.19 MG/DL — SIGNIFICANT CHANGE UP (ref 0.5–1.3)
EOSINOPHIL # BLD AUTO: 0.1 K/UL — SIGNIFICANT CHANGE UP (ref 0–0.5)
EOSINOPHIL # BLD AUTO: 0.13 K/UL — SIGNIFICANT CHANGE UP (ref 0–0.5)
EOSINOPHIL NFR BLD AUTO: 1.1 % — SIGNIFICANT CHANGE UP (ref 0–6)
EOSINOPHIL NFR BLD AUTO: 1.2 % — SIGNIFICANT CHANGE UP (ref 0–6)
GAS PNL BLDV: 138 MMOL/L — SIGNIFICANT CHANGE UP (ref 136–146)
GLUCOSE BLDV-MCNC: 144 — HIGH (ref 70–99)
GLUCOSE SERPL-MCNC: 126 MG/DL — HIGH (ref 70–99)
GLUCOSE SERPL-MCNC: 134 MG/DL — HIGH (ref 70–99)
HCO3 BLDV-SCNC: 24 MMOL/L — SIGNIFICANT CHANGE UP (ref 20–27)
HCT VFR BLD CALC: 39.2 % — SIGNIFICANT CHANGE UP (ref 34.5–45)
HCT VFR BLD CALC: 41.3 % — SIGNIFICANT CHANGE UP (ref 34.5–45)
HCT VFR BLDV CALC: 37.8 % — SIGNIFICANT CHANGE UP (ref 34.5–45)
HGB BLD-MCNC: 12.3 G/DL — SIGNIFICANT CHANGE UP (ref 11.5–15.5)
HGB BLD-MCNC: 12.6 G/DL — SIGNIFICANT CHANGE UP (ref 11.5–15.5)
HGB BLDV-MCNC: 12.3 G/DL — SIGNIFICANT CHANGE UP (ref 11.5–15.5)
IMM GRANULOCYTES # BLD AUTO: 0.04 # — SIGNIFICANT CHANGE UP
IMM GRANULOCYTES # BLD AUTO: 0.07 # — SIGNIFICANT CHANGE UP
IMM GRANULOCYTES NFR BLD AUTO: 0.5 % — SIGNIFICANT CHANGE UP (ref 0–1.5)
IMM GRANULOCYTES NFR BLD AUTO: 0.6 % — SIGNIFICANT CHANGE UP (ref 0–1.5)
INR BLD: 0.98 — SIGNIFICANT CHANGE UP (ref 0.88–1.17)
LACTATE BLDV-MCNC: 1.6 MMOL/L — SIGNIFICANT CHANGE UP (ref 0.5–2)
LIDOCAIN IGE QN: 74.3 U/L — HIGH (ref 7–60)
LYMPHOCYTES # BLD AUTO: 1.73 K/UL — SIGNIFICANT CHANGE UP (ref 1–3.3)
LYMPHOCYTES # BLD AUTO: 2.5 K/UL — SIGNIFICANT CHANGE UP (ref 1–3.3)
LYMPHOCYTES # BLD AUTO: 20.9 % — SIGNIFICANT CHANGE UP (ref 13–44)
LYMPHOCYTES # BLD AUTO: 22.1 % — SIGNIFICANT CHANGE UP (ref 13–44)
MCHC RBC-ENTMCNC: 30.5 % — LOW (ref 32–36)
MCHC RBC-ENTMCNC: 31.1 PG — SIGNIFICANT CHANGE UP (ref 27–34)
MCHC RBC-ENTMCNC: 31.4 % — LOW (ref 32–36)
MCHC RBC-ENTMCNC: 32.1 PG — SIGNIFICANT CHANGE UP (ref 27–34)
MCV RBC AUTO: 102 FL — HIGH (ref 80–100)
MCV RBC AUTO: 102.3 FL — HIGH (ref 80–100)
MONOCYTES # BLD AUTO: 0.61 K/UL — SIGNIFICANT CHANGE UP (ref 0–0.9)
MONOCYTES # BLD AUTO: 0.81 K/UL — SIGNIFICANT CHANGE UP (ref 0–0.9)
MONOCYTES NFR BLD AUTO: 7.1 % — SIGNIFICANT CHANGE UP (ref 2–14)
MONOCYTES NFR BLD AUTO: 7.4 % — SIGNIFICANT CHANGE UP (ref 2–14)
NEUTROPHILS # BLD AUTO: 5.76 K/UL — SIGNIFICANT CHANGE UP (ref 1.8–7.4)
NEUTROPHILS # BLD AUTO: 7.78 K/UL — HIGH (ref 1.8–7.4)
NEUTROPHILS NFR BLD AUTO: 68.7 % — SIGNIFICANT CHANGE UP (ref 43–77)
NEUTROPHILS NFR BLD AUTO: 69.8 % — SIGNIFICANT CHANGE UP (ref 43–77)
NRBC # FLD: 0 — SIGNIFICANT CHANGE UP
NRBC # FLD: 0 — SIGNIFICANT CHANGE UP
PCO2 BLDV: 59 MMHG — HIGH (ref 41–51)
PH BLDV: 7.29 PH — LOW (ref 7.32–7.43)
PLATELET # BLD AUTO: 259 K/UL — SIGNIFICANT CHANGE UP (ref 150–400)
PLATELET # BLD AUTO: 270 K/UL — SIGNIFICANT CHANGE UP (ref 150–400)
PMV BLD: 10.1 FL — SIGNIFICANT CHANGE UP (ref 7–13)
PMV BLD: 10.6 FL — SIGNIFICANT CHANGE UP (ref 7–13)
PO2 BLDV: 56 MMHG — HIGH (ref 35–40)
POTASSIUM BLDV-SCNC: 4.3 MMOL/L — SIGNIFICANT CHANGE UP (ref 3.4–4.5)
POTASSIUM SERPL-MCNC: 5.1 MMOL/L — SIGNIFICANT CHANGE UP (ref 3.5–5.3)
POTASSIUM SERPL-MCNC: 5.2 MMOL/L — SIGNIFICANT CHANGE UP (ref 3.5–5.3)
POTASSIUM SERPL-SCNC: 5.1 MMOL/L — SIGNIFICANT CHANGE UP (ref 3.5–5.3)
POTASSIUM SERPL-SCNC: 5.2 MMOL/L — SIGNIFICANT CHANGE UP (ref 3.5–5.3)
PROT SERPL-MCNC: 7.7 G/DL — SIGNIFICANT CHANGE UP (ref 6–8.3)
PROTHROM AB SERPL-ACNC: 11.2 SEC — SIGNIFICANT CHANGE UP (ref 9.8–13.1)
RBC # BLD: 3.83 M/UL — SIGNIFICANT CHANGE UP (ref 3.8–5.2)
RBC # BLD: 4.05 M/UL — SIGNIFICANT CHANGE UP (ref 3.8–5.2)
RBC # FLD: 15 % — HIGH (ref 10.3–14.5)
RBC # FLD: 15.1 % — HIGH (ref 10.3–14.5)
RH IG SCN BLD-IMP: POSITIVE — SIGNIFICANT CHANGE UP
SAO2 % BLDV: 82.3 % — SIGNIFICANT CHANGE UP (ref 60–85)
SODIUM SERPL-SCNC: 139 MMOL/L — SIGNIFICANT CHANGE UP (ref 135–145)
SODIUM SERPL-SCNC: 140 MMOL/L — SIGNIFICANT CHANGE UP (ref 135–145)
TROPONIN T, HIGH SENSITIVITY: 15 NG/L — SIGNIFICANT CHANGE UP (ref ?–14)
TROPONIN T, HIGH SENSITIVITY: 16 NG/L — SIGNIFICANT CHANGE UP (ref ?–14)
WBC # BLD: 11.33 K/UL — HIGH (ref 3.8–10.5)
WBC # BLD: 8.26 K/UL — SIGNIFICANT CHANGE UP (ref 3.8–10.5)
WBC # FLD AUTO: 11.33 K/UL — HIGH (ref 3.8–10.5)
WBC # FLD AUTO: 8.26 K/UL — SIGNIFICANT CHANGE UP (ref 3.8–10.5)

## 2018-12-15 PROCEDURE — 71046 X-RAY EXAM CHEST 2 VIEWS: CPT | Mod: 26

## 2018-12-15 PROCEDURE — 74177 CT ABD & PELVIS W/CONTRAST: CPT | Mod: 26

## 2018-12-15 PROCEDURE — 71045 X-RAY EXAM CHEST 1 VIEW: CPT | Mod: 26,59

## 2018-12-15 RX ORDER — SODIUM CHLORIDE 9 MG/ML
1000 INJECTION INTRAMUSCULAR; INTRAVENOUS; SUBCUTANEOUS ONCE
Qty: 0 | Refills: 0 | Status: COMPLETED | OUTPATIENT
Start: 2018-12-15 | End: 2018-12-15

## 2018-12-15 RX ORDER — ONDANSETRON 8 MG/1
4 TABLET, FILM COATED ORAL ONCE
Qty: 0 | Refills: 0 | Status: COMPLETED | OUTPATIENT
Start: 2018-12-15 | End: 2018-12-15

## 2018-12-15 RX ORDER — MORPHINE SULFATE 50 MG/1
4 CAPSULE, EXTENDED RELEASE ORAL ONCE
Qty: 0 | Refills: 0 | Status: DISCONTINUED | OUTPATIENT
Start: 2018-12-15 | End: 2018-12-15

## 2018-12-15 RX ORDER — MORPHINE SULFATE 50 MG/1
2 CAPSULE, EXTENDED RELEASE ORAL ONCE
Qty: 0 | Refills: 0 | Status: DISCONTINUED | OUTPATIENT
Start: 2018-12-15 | End: 2018-12-15

## 2018-12-15 RX ORDER — HYDROMORPHONE HYDROCHLORIDE 2 MG/ML
1 INJECTION INTRAMUSCULAR; INTRAVENOUS; SUBCUTANEOUS ONCE
Qty: 0 | Refills: 0 | Status: DISCONTINUED | OUTPATIENT
Start: 2018-12-15 | End: 2018-12-15

## 2018-12-15 RX ORDER — SODIUM CHLORIDE 9 MG/ML
1000 INJECTION, SOLUTION INTRAVENOUS
Qty: 0 | Refills: 0 | Status: DISCONTINUED | OUTPATIENT
Start: 2018-12-15 | End: 2018-12-16

## 2018-12-15 RX ADMIN — SODIUM CHLORIDE 1000 MILLILITER(S): 9 INJECTION INTRAMUSCULAR; INTRAVENOUS; SUBCUTANEOUS at 21:51

## 2018-12-15 RX ADMIN — MORPHINE SULFATE 2 MILLIGRAM(S): 50 CAPSULE, EXTENDED RELEASE ORAL at 19:18

## 2018-12-15 RX ADMIN — ONDANSETRON 4 MILLIGRAM(S): 8 TABLET, FILM COATED ORAL at 20:57

## 2018-12-15 RX ADMIN — SODIUM CHLORIDE 1000 MILLILITER(S): 9 INJECTION INTRAMUSCULAR; INTRAVENOUS; SUBCUTANEOUS at 21:36

## 2018-12-15 RX ADMIN — HYDROMORPHONE HYDROCHLORIDE 1 MILLIGRAM(S): 2 INJECTION INTRAMUSCULAR; INTRAVENOUS; SUBCUTANEOUS at 23:29

## 2018-12-15 RX ADMIN — MORPHINE SULFATE 4 MILLIGRAM(S): 50 CAPSULE, EXTENDED RELEASE ORAL at 22:09

## 2018-12-15 RX ADMIN — MORPHINE SULFATE 4 MILLIGRAM(S): 50 CAPSULE, EXTENDED RELEASE ORAL at 21:51

## 2018-12-15 RX ADMIN — MORPHINE SULFATE 2 MILLIGRAM(S): 50 CAPSULE, EXTENDED RELEASE ORAL at 21:22

## 2018-12-15 RX ADMIN — HYDROMORPHONE HYDROCHLORIDE 1 MILLIGRAM(S): 2 INJECTION INTRAMUSCULAR; INTRAVENOUS; SUBCUTANEOUS at 22:59

## 2018-12-15 NOTE — ED PROVIDER NOTE - OBJECTIVE STATEMENT
63F COPD, pulm HTN, RA on prednisone, hypothyroidism, CVA 15y ago, HTN, DM, current smoker p/w sharp midsternal chest pain w/ radiation to the back that began yesterday while she was sitting at 4pm. The pain was associated with diaphoresis and the pt reports increased SOB for the last few days. At baseline, the pt is able to walk about 5 steps due to her RA/OA/herniated disc however recently she has had SOB with moving around in bed. The pt also reports having nausea and vomiting today, with her vomiting a total of 3x today, all NBNB. The pt took Tylenol which did not help. The pt vomits about 30min after drinking water or eating and has thus not taken her meds this AM. 63F COPD, mod pHTN, CKD, RA on prednisone, hypothyroidism, CVA 15y ago, HTN, DM, current smoker p/w sharp midsternal chest pain w/ radiation to the back that began yesterday while she was sitting at 4pm. The pain was associated with diaphoresis and the pt reports increased SOB for the last few days. At baseline, the pt is able to walk about 5 steps due to her RA/OA/herniated disc however recently she has had SOB with moving around in bed. The pt also reports having nausea and vomiting today, with her vomiting a total of 3x today, all NBNB. The pt took Tylenol which did not help. The pt vomits about 30min after drinking water or eating and has thus not taken her meds this AM.

## 2018-12-15 NOTE — ED ADULT NURSE REASSESSMENT NOTE - NS ED NURSE REASSESS COMMENT FT1
MD Cote at bedside inserting NGT. NGT connected to suction. pt  cleaned, changed and repositioned. 2nd line; 20G IV inserted in L forearm, labs drawn and sent as ordered. medications given as ordered. will continue to monitor.

## 2018-12-15 NOTE — H&P ADULT - ASSESSMENT
63 year old woman with COPD on home O2, uncontrolled DM, RA on chronic prednisone, pulmonary HTN, s/p c-sectionx2 presents with SBO concerning for compromised bowel    - Admit to surgical service with SICU consultation for postoperative care   - to OR for ex lap, lysis of adhesions, possible bowel resection and/or ostomy  - NGT/IVF  - patient seen and examined with Dr. Lesly Rm MD PGY4 [id number]

## 2018-12-15 NOTE — ED PROVIDER NOTE - MEDICAL DECISION MAKING DETAILS
63F COPD, RA, CKD, hypothyroidism, DM, HTN, smoker p/w sharp midsternal non-exertional chest pain a/w diaphoresis, SOB, and N/V. Labs, CXR, CT AP.

## 2018-12-15 NOTE — H&P ADULT - NSHPREVIEWOFSYSTEMS_GEN_ALL_CORE
REVIEW OF SYSTEMS:    CONSTITUTIONAL: No weakness, fevers or chills  EYES/ENT: No visual changes;  No vertigo or throat pain   NECK: No pain or stiffness  RESPIRATORY: + dyspnea on exertion  CARDIOVASCULAR: No chest pain or palpitations  GASTROINTESTINAL: See HPI  GENITOURINARY: No dysuria, frequency or hematuria  NEUROLOGICAL: No numbness or weakness  SKIN: No itching, burning, rashes, or lesions   All other review of systems is negative unless indicated above.

## 2018-12-15 NOTE — ED PROVIDER NOTE - ENMT, MLM
Airway patent, Nasal mucosa clear. Mouth with normal mucosa. Throat has no vesicles, no oropharyngeal exudates and uvula is midline. Puffy eyes.

## 2018-12-15 NOTE — ED PROVIDER NOTE - ATTENDING CONTRIBUTION TO CARE
Wiggins: 63 yof with HTN, CKD, RA on prednisone c/o chest pain not radiating to back but is radiating up, started at rest associated with sob and diaphoresis. Pt also complaining of abd pain with nausea, and vomiting, no fever, no diarrhea, pain continues now but milder. On exam, agree with above exam except tender in mainly umbilical area. Labs, CT, EKG shows flat t waves laterally that are new. Will admit to tele after CT if negative for acute path.

## 2018-12-15 NOTE — ED ADULT NURSE REASSESSMENT NOTE - NS ED NURSE REASSESS COMMENT FT1
Pt back from CT. Pt appears uncomfortable. VSS. Pt ABD is rigid and tender in upper quadrants. Bowel sounds are present in all quadrants however pt is nauseaus. Pt has clear inspiratory BS with groaning on exhalation. Pt has facial edema. As per son, pt appears more edematous than usual. Pt suddenly had episode of large yellow vomiting. MD Wiggins made notified immediately.

## 2018-12-15 NOTE — ED PROVIDER NOTE - CONSTITUTIONAL, MLM
normal... Obese appearing, in mod pain, well nourished, awake, alert, oriented to person, place, time/situation and in no apparent distress.

## 2018-12-15 NOTE — ED ADULT NURSE REASSESSMENT NOTE - NS ED NURSE REASSESS COMMENT FT1
pt  skin dry and intact no breaks noted/ colostomy in place draining dark br stool. Antoine cath placed 14 fr. draining qs clear terence urine/ ortho  to see pt and family

## 2018-12-15 NOTE — H&P ADULT - NSHPLABSRESULTS_GEN_ALL_CORE
Labs  CBC (12-15 @ 22:45)                          12.6                     8.26    )--------------(  259        69.8  % Neuts, 20.9  % Lymphs, ANC: 5.76                            41.3    CBC (12-15 @ 16:04)                          12.3                     11.33<H>  )--------------(  270        68.7  % Neuts, 22.1  % Lymphs, ANC: 7.78<H>                          39.2      BMP (12-15 @ 16:04)       140     |  102     |  24<H> 			Ca++ --      Ca 10.2         ---------------------------------( 126<H>		Mg --           5.1     |  23      |  1.19  			Ph --        LFTs (12-15 @ 16:04)      TPro 7.7 / Alb 3.8 / TBili < 0.2<L> / DBili -- / AST 35<H> / ALT 15 / AlkPhos 68    Coags (12-15 @ 21:22)  aPTT 24.9<L> / INR 0.98 / PT 11.2        VBG (12-15 @ 22:40)     7.29<L> / 59<H> / 56<H> / 24 / 1.4 / 82.3%      Lactate: 1.6      Imaging  CT abdomen/pelvis   Diffusely dilated fluid filled small bowel loops with a transition point   in the lower abdomen (2, 83) consistent with small bowel obstruction.   Thickened jejunal folds with hyperemia at the level of the transition   point. Proximal jejunal folds are also thickened with surrounding   interloop fluid though detailed evaluation is limited secondary to   inadequate distention. Mild mesenteric edema and interloop fluid   surrounding distal jejunoileal folds with fecalization of the ileal loops   identified

## 2018-12-15 NOTE — ED ADULT NURSE NOTE - NSIMPLEMENTINTERV_GEN_ALL_ED
Implemented All Fall Risk Interventions:  [place] to call system. Call bell, personal items and telephone within reach. Instruct patient to call for assistance. Room bathroom lighting operational. Non-slip footwear when patient is off stretcher. Physically safe environment: no spills, clutter or unnecessary equipment. Stretcher in lowest position, wheels locked, appropriate side rails in place. Provide visual cue, wrist band, yellow gown, etc. Monitor gait and stability. Monitor for mental status changes and reorient to person, place, and time. Review medications for side effects contributing to fall risk. Reinforce activity limits and safety measures with patient and family.

## 2018-12-15 NOTE — H&P ADULT - NSHPPHYSICALEXAM_GEN_ALL_CORE
Vital Signs Last 24 Hrs  T(C): 37.2 (15 Dec 2018 22:08), Max: 37.2 (15 Dec 2018 22:08)  T(F): 99 (15 Dec 2018 22:08), Max: 99 (15 Dec 2018 22:08)  HR: 100 (15 Dec 2018 23:02) (93 - 100)  BP: 140/81 (15 Dec 2018 23:02) (132/95 - 156/108)  RR: 17 (15 Dec 2018 23:02) (16 - 29)  SpO2: 95% (15 Dec 2018 23:02) (95% - 98%)    General: Uncomfortable appearing   Neurologic: No focal deficits  Psychiatric: Appropriate affect  Cardiovascular: Regular rate and rhythm  Pulmonary: Unlabored breathing  Abdominal: Distended, mild diffuse tenderness, no rebound   Extremities: Moves all without limitations  Skin: Warm, no rashes

## 2018-12-15 NOTE — ED ADULT NURSE REASSESSMENT NOTE - NS ED NURSE REASSESS COMMENT FT1
On reassessment, pt appears more comfortable and no longer has groaning on exhalation. Pt reports partially relieved ABD pain. Will continue to monitor.

## 2018-12-15 NOTE — H&P ADULT - NSHPSOCIALHISTORY_GEN_ALL_CORE
Former 15 pack year smoker, quit 2 years ago; no etoh or drug abuse; lives with aide, present 7 days weekly

## 2018-12-15 NOTE — ED ADULT NURSE NOTE - PMH
Adult Hypothyroidism    Arthritis    Borderline Diabetes Mellitus    Chronic Asthma    Cigarette Smoker    CVA (Cerebral Vascular Accident)    Diverticulosis    H/O: HTN    High Cholesterol    RA (Rheumatoid Arthritis)    Tooth Abscess

## 2018-12-15 NOTE — H&P ADULT - ATTENDING COMMENTS
Above noted. History obtained and the patient was examined. Work-up is consistent with a small bowel obstruction.  Laparotomy, possible bowel resection, possible ostomy were discussed in detail with the patient in anticipation of the surgical intervention.

## 2018-12-15 NOTE — ED ADULT NURSE REASSESSMENT NOTE - NS ED NURSE REASSESS COMMENT FT1
Report taken from [facility]. Pt needs POC glucose RN to obtain. Pt resting on cart, respirations are equal and nonlabored, no distress noted. Pt updated on poc and duration, continue to monitor.      200 Rashard Rivas RN  09/12/18 [id number] pt medicated with morphine for pain. pt to cat scan

## 2018-12-15 NOTE — H&P ADULT - HISTORY OF PRESENT ILLNESS
63 year old woman with COPD on home O2 (3L), moderate pulmonary HTN, DM, RA on prednisone (10 mg daily x 30 years), HTN, s/p CVA (no residual deficit), s/p  x2 presents with abdominal pain for one day.  She developed mid abdominal cramping pain, sharp, last evening that has been constant and moderate to severe.  It was associated with multiple episodes of nonbloody, nonbilious emesis.  She had a normal bowel movement this morning and has not passed flatus today.      CT scan shows SBO with transition point in the lower abdomen with associated interloop fluid and mesenteric edema.

## 2018-12-15 NOTE — ED ADULT NURSE REASSESSMENT NOTE - NS ED NURSE REASSESS COMMENT FT1
On routine reassessment, 200ml of yellow/brown fluids draining from NGT. Pt denies nausea at this time. Upper ABD remains rigid, however is less rigid that initial assessment. Pt has family at bedside. Pt repositioned and offered pillows for comfort. Call light within reach. Pt states she will notify for any changes or worsening in symptoms.

## 2018-12-15 NOTE — ED ADULT NURSE REASSESSMENT NOTE - NS ED NURSE REASSESS COMMENT FT1
Report received from [facility]. Pt appears in NAD. pt denies pain. Pt awaiting CT results. VSS Report received from [facility]. Pt currently at CT.

## 2018-12-15 NOTE — ED PROCEDURE NOTE - ATTENDING CONTRIBUTION TO CARE
Dr. Wiggins: I personally supervised this procedure performed by the resident and I agree with the above documentation.

## 2018-12-16 LAB
APTT BLD: 24.6 SEC — LOW (ref 27.5–36.3)
BASE EXCESS BLDA CALC-SCNC: 2.6 MMOL/L — SIGNIFICANT CHANGE UP
BUN SERPL-MCNC: 25 MG/DL — HIGH (ref 7–23)
CA-I BLDA-SCNC: 1.17 MMOL/L — SIGNIFICANT CHANGE UP (ref 1.15–1.29)
CALCIUM SERPL-MCNC: 8.6 MG/DL — SIGNIFICANT CHANGE UP (ref 8.4–10.5)
CHLORIDE SERPL-SCNC: 103 MMOL/L — SIGNIFICANT CHANGE UP (ref 98–107)
CO2 SERPL-SCNC: 25 MMOL/L — SIGNIFICANT CHANGE UP (ref 22–31)
CREAT SERPL-MCNC: 1.15 MG/DL — SIGNIFICANT CHANGE UP (ref 0.5–1.3)
GLUCOSE BLDA-MCNC: 147 MG/DL — HIGH (ref 70–99)
GLUCOSE BLDC GLUCOMTR-MCNC: 120 MG/DL — HIGH (ref 70–99)
GLUCOSE BLDC GLUCOMTR-MCNC: 136 MG/DL — HIGH (ref 70–99)
GLUCOSE BLDC GLUCOMTR-MCNC: 180 MG/DL — HIGH (ref 70–99)
GLUCOSE BLDC GLUCOMTR-MCNC: 188 MG/DL — HIGH (ref 70–99)
GLUCOSE SERPL-MCNC: 140 MG/DL — HIGH (ref 70–99)
HBA1C BLD-MCNC: 7.3 % — HIGH (ref 4–5.6)
HCO3 BLDA-SCNC: 27 MMOL/L — HIGH (ref 22–26)
HCT VFR BLD CALC: 33.6 % — LOW (ref 34.5–45)
HCT VFR BLDA CALC: 34.5 % — SIGNIFICANT CHANGE UP (ref 34.5–46.5)
HGB BLD-MCNC: 10.5 G/DL — LOW (ref 11.5–15.5)
HGB BLDA-MCNC: 11.2 G/DL — LOW (ref 11.5–15.5)
INR BLD: 1.11 — SIGNIFICANT CHANGE UP (ref 0.88–1.17)
LACTATE BLDA-SCNC: 1.5 MMOL/L — SIGNIFICANT CHANGE UP (ref 0.5–2)
MAGNESIUM SERPL-MCNC: 1.5 MG/DL — LOW (ref 1.6–2.6)
MCHC RBC-ENTMCNC: 31.1 PG — SIGNIFICANT CHANGE UP (ref 27–34)
MCHC RBC-ENTMCNC: 31.3 % — LOW (ref 32–36)
MCV RBC AUTO: 99.4 FL — SIGNIFICANT CHANGE UP (ref 80–100)
NRBC # FLD: 0 — SIGNIFICANT CHANGE UP
PCO2 BLDA: 37 MMHG — SIGNIFICANT CHANGE UP (ref 32–48)
PH BLDA: 7.47 PH — HIGH (ref 7.35–7.45)
PHOSPHATE SERPL-MCNC: 3.8 MG/DL — SIGNIFICANT CHANGE UP (ref 2.5–4.5)
PLATELET # BLD AUTO: 233 K/UL — SIGNIFICANT CHANGE UP (ref 150–400)
PMV BLD: 10.3 FL — SIGNIFICANT CHANGE UP (ref 7–13)
PO2 BLDA: 185 MMHG — HIGH (ref 83–108)
POTASSIUM BLDA-SCNC: 4.3 MMOL/L — SIGNIFICANT CHANGE UP (ref 3.4–4.5)
POTASSIUM SERPL-MCNC: 4.1 MMOL/L — SIGNIFICANT CHANGE UP (ref 3.5–5.3)
POTASSIUM SERPL-SCNC: 4.1 MMOL/L — SIGNIFICANT CHANGE UP (ref 3.5–5.3)
PROTHROM AB SERPL-ACNC: 12.4 SEC — SIGNIFICANT CHANGE UP (ref 9.8–13.1)
RBC # BLD: 3.38 M/UL — LOW (ref 3.8–5.2)
RBC # FLD: 14.9 % — HIGH (ref 10.3–14.5)
SAO2 % BLDA: 97.8 % — SIGNIFICANT CHANGE UP (ref 95–99)
SODIUM BLDA-SCNC: 140 MMOL/L — SIGNIFICANT CHANGE UP (ref 136–146)
SODIUM SERPL-SCNC: 142 MMOL/L — SIGNIFICANT CHANGE UP (ref 135–145)
WBC # BLD: 5.51 K/UL — SIGNIFICANT CHANGE UP (ref 3.8–10.5)
WBC # FLD AUTO: 5.51 K/UL — SIGNIFICANT CHANGE UP (ref 3.8–10.5)

## 2018-12-16 PROCEDURE — 71045 X-RAY EXAM CHEST 1 VIEW: CPT | Mod: 26

## 2018-12-16 PROCEDURE — 99223 1ST HOSP IP/OBS HIGH 75: CPT | Mod: 25,GC

## 2018-12-16 RX ORDER — PHENYLEPHRINE HYDROCHLORIDE 10 MG/ML
0.4 INJECTION INTRAVENOUS
Qty: 40 | Refills: 0 | Status: DISCONTINUED | OUTPATIENT
Start: 2018-12-16 | End: 2018-12-16

## 2018-12-16 RX ORDER — ACETAMINOPHEN 500 MG
1000 TABLET ORAL ONCE
Qty: 0 | Refills: 0 | Status: COMPLETED | OUTPATIENT
Start: 2018-12-16 | End: 2018-12-16

## 2018-12-16 RX ORDER — HYDROCORTISONE 20 MG
50 TABLET ORAL EVERY 6 HOURS
Qty: 0 | Refills: 0 | Status: DISCONTINUED | OUTPATIENT
Start: 2018-12-16 | End: 2018-12-17

## 2018-12-16 RX ORDER — HYDROMORPHONE HYDROCHLORIDE 2 MG/ML
0.5 INJECTION INTRAMUSCULAR; INTRAVENOUS; SUBCUTANEOUS
Qty: 0 | Refills: 0 | Status: DISCONTINUED | OUTPATIENT
Start: 2018-12-16 | End: 2018-12-17

## 2018-12-16 RX ORDER — PROPOFOL 10 MG/ML
20 INJECTION, EMULSION INTRAVENOUS
Qty: 500 | Refills: 0 | Status: DISCONTINUED | OUTPATIENT
Start: 2018-12-16 | End: 2018-12-16

## 2018-12-16 RX ORDER — FUROSEMIDE 40 MG
20 TABLET ORAL ONCE
Qty: 0 | Refills: 0 | Status: COMPLETED | OUTPATIENT
Start: 2018-12-16 | End: 2018-12-16

## 2018-12-16 RX ORDER — HYDROMORPHONE HYDROCHLORIDE 2 MG/ML
1 INJECTION INTRAMUSCULAR; INTRAVENOUS; SUBCUTANEOUS EVERY 4 HOURS
Qty: 0 | Refills: 0 | Status: DISCONTINUED | OUTPATIENT
Start: 2018-12-16 | End: 2018-12-17

## 2018-12-16 RX ORDER — FENTANYL CITRATE 50 UG/ML
1 INJECTION INTRAVENOUS
Qty: 2500 | Refills: 0 | Status: DISCONTINUED | OUTPATIENT
Start: 2018-12-16 | End: 2018-12-16

## 2018-12-16 RX ORDER — TIOTROPIUM BROMIDE 18 UG/1
1 CAPSULE ORAL; RESPIRATORY (INHALATION) DAILY
Qty: 0 | Refills: 0 | Status: DISCONTINUED | OUTPATIENT
Start: 2018-12-16 | End: 2018-12-31

## 2018-12-16 RX ORDER — PANTOPRAZOLE SODIUM 20 MG/1
40 TABLET, DELAYED RELEASE ORAL DAILY
Qty: 0 | Refills: 0 | Status: DISCONTINUED | OUTPATIENT
Start: 2018-12-16 | End: 2018-12-17

## 2018-12-16 RX ORDER — ACETAMINOPHEN 500 MG
1000 TABLET ORAL ONCE
Qty: 0 | Refills: 0 | Status: COMPLETED | OUTPATIENT
Start: 2018-12-17 | End: 2018-12-17

## 2018-12-16 RX ORDER — BUDESONIDE AND FORMOTEROL FUMARATE DIHYDRATE 160; 4.5 UG/1; UG/1
2 AEROSOL RESPIRATORY (INHALATION)
Qty: 0 | Refills: 0 | Status: DISCONTINUED | OUTPATIENT
Start: 2018-12-16 | End: 2019-01-31

## 2018-12-16 RX ORDER — PROPOFOL 10 MG/ML
5 INJECTION, EMULSION INTRAVENOUS
Qty: 500 | Refills: 0 | Status: DISCONTINUED | OUTPATIENT
Start: 2018-12-16 | End: 2018-12-16

## 2018-12-16 RX ORDER — METOPROLOL TARTRATE 50 MG
5 TABLET ORAL EVERY 6 HOURS
Qty: 0 | Refills: 0 | Status: DISCONTINUED | OUTPATIENT
Start: 2018-12-16 | End: 2018-12-17

## 2018-12-16 RX ORDER — LEVOTHYROXINE SODIUM 125 MCG
95 TABLET ORAL AT BEDTIME
Qty: 0 | Refills: 0 | Status: DISCONTINUED | OUTPATIENT
Start: 2018-12-16 | End: 2018-12-17

## 2018-12-16 RX ORDER — INFLUENZA VIRUS VACCINE 15; 15; 15; 15 UG/.5ML; UG/.5ML; UG/.5ML; UG/.5ML
0.5 SUSPENSION INTRAMUSCULAR ONCE
Qty: 0 | Refills: 0 | Status: DISCONTINUED | OUTPATIENT
Start: 2018-12-16 | End: 2019-02-27

## 2018-12-16 RX ORDER — HEPARIN SODIUM 5000 [USP'U]/ML
5000 INJECTION INTRAVENOUS; SUBCUTANEOUS EVERY 8 HOURS
Qty: 0 | Refills: 0 | Status: DISCONTINUED | OUTPATIENT
Start: 2018-12-16 | End: 2019-01-02

## 2018-12-16 RX ORDER — INSULIN LISPRO 100/ML
VIAL (ML) SUBCUTANEOUS EVERY 6 HOURS
Qty: 0 | Refills: 0 | Status: DISCONTINUED | OUTPATIENT
Start: 2018-12-16 | End: 2018-12-19

## 2018-12-16 RX ORDER — INSULIN GLARGINE 100 [IU]/ML
14 INJECTION, SOLUTION SUBCUTANEOUS AT BEDTIME
Qty: 0 | Refills: 0 | Status: DISCONTINUED | OUTPATIENT
Start: 2018-12-16 | End: 2018-12-17

## 2018-12-16 RX ORDER — ALBUTEROL 90 UG/1
1 AEROSOL, METERED ORAL EVERY 4 HOURS
Qty: 0 | Refills: 0 | Status: DISCONTINUED | OUTPATIENT
Start: 2018-12-16 | End: 2019-01-04

## 2018-12-16 RX ORDER — HYDROMORPHONE HYDROCHLORIDE 2 MG/ML
0.5 INJECTION INTRAMUSCULAR; INTRAVENOUS; SUBCUTANEOUS ONCE
Qty: 0 | Refills: 0 | Status: DISCONTINUED | OUTPATIENT
Start: 2018-12-16 | End: 2018-12-16

## 2018-12-16 RX ADMIN — HYDROMORPHONE HYDROCHLORIDE 0.5 MILLIGRAM(S): 2 INJECTION INTRAMUSCULAR; INTRAVENOUS; SUBCUTANEOUS at 14:51

## 2018-12-16 RX ADMIN — Medication 5 MILLIGRAM(S): at 23:11

## 2018-12-16 RX ADMIN — BUDESONIDE AND FORMOTEROL FUMARATE DIHYDRATE 2 PUFF(S): 160; 4.5 AEROSOL RESPIRATORY (INHALATION) at 21:24

## 2018-12-16 RX ADMIN — SODIUM CHLORIDE 125 MILLILITER(S): 9 INJECTION, SOLUTION INTRAVENOUS at 04:41

## 2018-12-16 RX ADMIN — HYDROMORPHONE HYDROCHLORIDE 0.5 MILLIGRAM(S): 2 INJECTION INTRAMUSCULAR; INTRAVENOUS; SUBCUTANEOUS at 13:36

## 2018-12-16 RX ADMIN — FENTANYL CITRATE 9.5 MICROGRAM(S)/KG/HR: 50 INJECTION INTRAVENOUS at 04:15

## 2018-12-16 RX ADMIN — Medication 95 MICROGRAM(S): at 22:13

## 2018-12-16 RX ADMIN — HYDROMORPHONE HYDROCHLORIDE 1 MILLIGRAM(S): 2 INJECTION INTRAMUSCULAR; INTRAVENOUS; SUBCUTANEOUS at 21:45

## 2018-12-16 RX ADMIN — HYDROMORPHONE HYDROCHLORIDE 1 MILLIGRAM(S): 2 INJECTION INTRAMUSCULAR; INTRAVENOUS; SUBCUTANEOUS at 15:40

## 2018-12-16 RX ADMIN — Medication 1000 MILLIGRAM(S): at 15:15

## 2018-12-16 RX ADMIN — INSULIN GLARGINE 14 UNIT(S): 100 INJECTION, SOLUTION SUBCUTANEOUS at 23:12

## 2018-12-16 RX ADMIN — HYDROMORPHONE HYDROCHLORIDE 0.5 MILLIGRAM(S): 2 INJECTION INTRAMUSCULAR; INTRAVENOUS; SUBCUTANEOUS at 11:15

## 2018-12-16 RX ADMIN — Medication 400 MILLIGRAM(S): at 20:06

## 2018-12-16 RX ADMIN — Medication 5 MILLIGRAM(S): at 17:03

## 2018-12-16 RX ADMIN — Medication 1000 MILLIGRAM(S): at 20:30

## 2018-12-16 RX ADMIN — PROPOFOL 11.4 MICROGRAM(S)/KG/MIN: 10 INJECTION, EMULSION INTRAVENOUS at 04:05

## 2018-12-16 RX ADMIN — HYDROMORPHONE HYDROCHLORIDE 1 MILLIGRAM(S): 2 INJECTION INTRAMUSCULAR; INTRAVENOUS; SUBCUTANEOUS at 15:55

## 2018-12-16 RX ADMIN — Medication 400 MILLIGRAM(S): at 15:00

## 2018-12-16 RX ADMIN — Medication 50 MILLIGRAM(S): at 23:11

## 2018-12-16 RX ADMIN — Medication 20 MILLIGRAM(S): at 15:45

## 2018-12-16 RX ADMIN — PANTOPRAZOLE SODIUM 40 MILLIGRAM(S): 20 TABLET, DELAYED RELEASE ORAL at 12:49

## 2018-12-16 RX ADMIN — Medication 2: at 17:15

## 2018-12-16 RX ADMIN — Medication 50 MILLIGRAM(S): at 17:06

## 2018-12-16 RX ADMIN — HYDROMORPHONE HYDROCHLORIDE 0.5 MILLIGRAM(S): 2 INJECTION INTRAMUSCULAR; INTRAVENOUS; SUBCUTANEOUS at 11:04

## 2018-12-16 RX ADMIN — HEPARIN SODIUM 5000 UNIT(S): 5000 INJECTION INTRAVENOUS; SUBCUTANEOUS at 21:24

## 2018-12-16 RX ADMIN — Medication 50 MILLIGRAM(S): at 12:39

## 2018-12-16 RX ADMIN — HYDROMORPHONE HYDROCHLORIDE 1 MILLIGRAM(S): 2 INJECTION INTRAMUSCULAR; INTRAVENOUS; SUBCUTANEOUS at 21:23

## 2018-12-16 RX ADMIN — Medication 2: at 12:41

## 2018-12-16 NOTE — CONSULT NOTE ADULT - ATTENDING COMMENTS
Ready for extubation.  Assess fluid status using POCUS; hold IVF - lasix versus mIVF depending on POCUS.  COPD management.

## 2018-12-16 NOTE — PATIENT PROFILE ADULT - NSPROHMDIABETMGMTSTRAT_GEN_A_NUR
activity/adequate rest/diet modification/insulin therapy/blood glucose testing/coping strategies/medication therapy/weight management/exercise/routine screenings

## 2018-12-16 NOTE — CONSULT NOTE ADULT - SUBJECTIVE AND OBJECTIVE BOX
CHIEF COMPLAINT: abdominal pain    HPI / INTERVAL HISTORY: history is obtained from the medical record and medical providers. The patient is unable to contribute due to intubation and sedation. No family or other accompanying persons are readily available to provide information.    63F hx COPD (baseline 3L home O2)/pulmonary HTN/DM/RA/HTN/CVA/ x2 presented to this hospital's ER with abdominal pain for one day. It was described as a cramping, sharp mid-abdominal pain, constant and gradually worsening, with no known inciting factor. Associated with NBNB emesis, number of episodes not specified. Last BM was yesterday morning, but no flatus since.    In the ER, a CT a/p showed an SBO with transition point in the lower abdomen with associated interloop fluid and mesenteric edema. The patient was taken to the OR emergently on concern that these findings represented early bowel compromise. Intra-operatively she was found to have numerous adhesions, but the intestine itself only demonstrated an area of congestion and dilitation w/o evidence of ischemia, no transition point. No bowel was resected, and the abdomen was closed. She was left intubated post-operatively due to anticipated difficulty weaning from the vent due to her pulmonary history, and the SICU was consulted for further cardiopulmonary management. Of note, she is on steroids chronically and received stress-dose steroids preoperatively.    PAST MEDICAL & SURGICAL HISTORY:  Diverticulosis  CVA (Cerebral Vascular Accident)  RA (Rheumatoid Arthritis)  Tooth Abscess  Arthritis  Cigarette Smoker  Chronic Asthma  Adult Hypothyroidism  Borderline Diabetes Mellitus  High Cholesterol  H/O: HTN  Wrist Disorder: S/P Surgery  History of  Section    FAMILY HISTORY:  No pertinent family history in first degree relatives    SOCIAL HISTORY: unable to obtain from patient at this time. Priors notes indicate the following:   Former 15 pack year smoker, quit 2 years ago; no etoh or drug abuse; lives with aide, present 7 days weekly    Allergies    No Known Allergies    Intolerances      REVIEW OF SYSTEMS:  Constitutional: No fevers, chills, weight loss, weight gain  HEENT: No vision problems, eye pain, nasal congestion, rhinorrhea, sore throat, dysphagia  CV: No chest pain, orthopnea, palpitations  Resp: No cough, dyspnea, wheezing, hemoptysis  GI: No nausea, vomiting, diarrhea, constipation, abdominal pain  : [ ] dysuria [ ] nocturia [ ] hematuria [ ] increased urinary frequency  Musculoskeletal: [ ] back pain [ ] myalgias [ ] arthralgias [ ] fracture  Skin: [ ] rash [ ] itch  Neurological: [ ] headache [ ] dizziness [ ] syncope [ ] weakness [ ] numbness  Psychiatric: [ ] anxiety [ ] depression  Endocrine: [ ] diabetes [ ] thyroid problem  Hematologic/Lymphatic: [ ] anemia [ ] bleeding problem  Allergic/Immunologic: [ ] itchy eyes [ ] nasal discharge [ ] hives [ ] angioedema  [ ] All other systems negative  [x] Unable to assess ROS because patient is sedated and intubated    OBJECTIVE:  ICU Vital Signs Last 24 Hrs  T(C): 36.8 (16 Dec 2018 03:50), Max: 37.2 (15 Dec 2018 22:08)  T(F): 98.2 (16 Dec 2018 03:50), Max: 99 (15 Dec 2018 22:08)  HR: 88 (16 Dec 2018 04:00) (88 - 100)  BP: 142/84 (16 Dec 2018 04:00) (128/90 - 156/108)  BP(mean): 99 (16 Dec 2018 04:00) (96 - 99)  ABP: 140/75 (16 Dec 2018 04:00) (139/78 - 155/83)  ABP(mean): 101 (16 Dec 2018 04:00) (101 - 112)  RR: 13 (16 Dec 2018 04:00) (13 - 29)  SpO2: 86% (16 Dec 2018 04:00) (86% - 100%)      CAPILLARY BLOOD GLUCOSE      POCT Blood Glucose.: 142 mg/dL (15 Dec 2018 15:19)      PHYSICAL EXAM:  General: sedated, minimally responsive to external stimuli, obes  Chest/Lungs: CTAB no w/r/r  Heart: RRR no m/r/g  Abdomen: obese, soft, non-distended, midline surgical incision with scant serosanguinous discharge  Extremities: warm, well perfused  Skin: no rash  Neuro: sedated    LINES: A-line, iraheta, NG tube, ETT, PIV    HOSPITAL MEDICATIONS:  MEDICATIONS  (STANDING):  fentaNYL   Infusion. 1 MICROgram(s)/kG/Hr (9.5 mL/Hr) IV Continuous <Continuous>  lactated ringers. 1000 milliLiter(s) (75 mL/Hr) IV Continuous <Continuous>  phenylephrine    Infusion 0.4 MICROgram(s)/kG/Min (14.25 mL/Hr) IV Continuous <Continuous>  propofol Infusion 20 MICROgram(s)/kG/Min (11.4 mL/Hr) IV Continuous <Continuous>    MEDICATIONS  (PRN):      LABS:                        12.6   8.26  )-----------( 259      ( 15 Dec 2018 22:45 )             41.3     Hgb Trend: 12.6<--, 12.3<--  12-15    139  |  103  |  22  ----------------------------<  134<H>  5.2   |  24  |  1.00    Ca    9.4      15 Dec 2018 22:45    TPro  7.7  /  Alb  3.8  /  TBili  < 0.2<L>  /  DBili  x   /  AST  35<H>  /  ALT  15  /  AlkPhos  68  12-15    Creatinine Trend: 1.00<--, 1.19<--  PT/INR - ( 15 Dec 2018 21:22 )   PT: 11.2 SEC;   INR: 0.98          PTT - ( 15 Dec 2018 21:22 )  PTT:24.9 SEC      Venous Blood Gas:  12-15 @ 22:40  7.29/59/56/24/82.3  VBG Lactate: 1.6    RADIOLOGY & ADDITIONAL TESTS:  CT a/p   IMPRESSION:     Diffusely dilated fluid filled small bowel loops with a transition point   in the lower abdomen (2, 83) consistent with small bowel obstruction.   Thickened jejunal folds with hyperemia at the level of the transition   point. Proximal jejunal folds are also thickened with surrounding   interloop fluid though detailed evaluation is limited secondary to   inadequate distention. Mild mesenteric edema and interloop fluid   surrounding distal jejunoileal folds with fecalization of the ileal loops   identified. Correlate with lactic acid to assess for vascular insult.   Decompression, surgical consultation and short-term imaging follow-up is   advised.

## 2018-12-16 NOTE — CONSULT NOTE ADULT - ASSESSMENT
63F w/ multiple respiratory comorbidities p/w SBO s/p ex-lap with findings of healthy bowel, admitted to SICU care for respiratory care.    Neuro:  -Wean propofol and fentanyl sedation as tolerated  -PRN analgesics for post-op pain    Respiratory:  -Ventilator: AC 12/600/5/60%  -Wean FiO2, consider decreasing TV based on IBW 63F w/ multiple respiratory comorbidities p/w SBO s/p ex-lap with findings of healthy bowel, admitted to SICU care for respiratory care.    Neuro:  -Wean propofol and fentanyl sedation as tolerated  -PRN analgesics for post-op pain    Respiratory  -Ventilator: AC 12/600/5/60%  -Wean FiO2, consider decreasing TV based on IBW  -Chest PT  -Daily SBT    Cardiac  -Continue anti-hypertensives, statin  -Continue lasix    GI  -NPO, hold TF pending determination of near-time extubation likelihood  -Continue home ppi  -Monitor abdominal pain/tenderness once less sedated    Renal  -Maintenance IVF while intubated  -Trend BUN/Cr    Heme  -CAROLYNE  -Continue anti-platelet agents    ID  -CAROLYNE    Endo  -Continue stress-dose steroids, convert to home dose  -SSI    Dispo: SICU    Jonathan Weil, PGY2  [id number] 63F w/ multiple respiratory comorbidities p/w SBO s/p ex-lap with findings of healthy bowel, admitted to SICU care for respiratory care.    Neuro:  -Wean propofol and fentanyl sedation as tolerated  -PRN analgesics for post-op pain    Respiratory  -Ventilator: AC 12/600/5/60%  -Wean FiO2, consider decreasing TV based on IBW  -Chest PT  -Daily SBT    Cardiac  -Continue anti-hypertensives, statin  -Continue lasix    GI  -NPO  -Continue home ppi  -Monitor abdominal pain/tenderness once less sedated    Renal  -Maintenance IVF while intubated  -Trend BUN/Cr    Heme  -CRAOLYNE  -Continue anti-platelet agents    ID  -CAROLYNE    Endo  -Continue stress-dose steroids, convert to home dose  -SSI    Dispo: SICU    Jonathan Weil, PGY2  [id number]

## 2018-12-16 NOTE — PROGRESS NOTE ADULT - SUBJECTIVE AND OBJECTIVE BOX
General Surgery Progress Note     Subjective: Patient was seen and examined during morning rounds.       Objective:    Physical Exam:  Gen: NAD, awake and alert,   Head/Neck: NC/AT  Resp: CTABL, nonlabored  Abdomen: distended, mild diffuse tenderness, no rebound  Extremities no edema, gross sensation intact, gross motor function intact      MEDICATIONS  (STANDING):  lactated ringers. 1000 milliLiter(s) (75 mL/Hr) IV Continuous <Continuous>    MEDICATIONS  (PRN):      Vital Signs Last 24 Hrs  T(C): 36.8 (16 Dec 2018 01:13), Max: 37.2 (15 Dec 2018 22:08)  T(F): 98.3 (16 Dec 2018 01:13), Max: 99 (15 Dec 2018 22:08)  HR: 100 (16 Dec 2018 01:13) (93 - 100)  BP: 140/81 (15 Dec 2018 23:02) (132/95 - 156/108)  BP(mean): --  RR: 17 (15 Dec 2018 23:02) (16 - 29)  SpO2: 97% (16 Dec 2018 01:13) (95% - 98%)        LABS:                        12.6   8.26  )-----------( 259      ( 15 Dec 2018 22:45 )             41.3     12-15    139  |  103  |  22  ----------------------------<  134<H>  5.2   |  24  |  1.00    Ca    9.4      15 Dec 2018 22:45    TPro  7.7  /  Alb  3.8  /  TBili  < 0.2<L>  /  DBili  x   /  AST  35<H>  /  ALT  15  /  AlkPhos  68  12-15 General Surgery Progress Note     Subjective: Patient was seen and examined during morning rounds. Patient intubated and unresponsive to questions on rounds.       Objective:    Physical Exam:  General: sedated, minimally responsive to external stimuli, obes  Chest/Lungs: CTAB no w/r/r  Heart: RRR no m/r/g  Abdomen: obese, soft, non-distended, midline surgical incision with scant serosanguinous discharge  Extremities: warm, well perfused  Skin: no rash  Neuro: sedated    MEDICATIONS  (STANDING):  lactated ringers. 1000 milliLiter(s) (75 mL/Hr) IV Continuous <Continuous>    MEDICATIONS  (PRN):      Vital Signs Last 24 Hrs  T(C): 36.8 (16 Dec 2018 01:13), Max: 37.2 (15 Dec 2018 22:08)  T(F): 98.3 (16 Dec 2018 01:13), Max: 99 (15 Dec 2018 22:08)  HR: 100 (16 Dec 2018 01:13) (93 - 100)  BP: 140/81 (15 Dec 2018 23:02) (132/95 - 156/108)  BP(mean): --  RR: 17 (15 Dec 2018 23:02) (16 - 29)  SpO2: 97% (16 Dec 2018 01:13) (95% - 98%)        LABS:                        12.6   8.26  )-----------( 259      ( 15 Dec 2018 22:45 )             41.3     12-15    139  |  103  |  22  ----------------------------<  134<H>  5.2   |  24  |  1.00    Ca    9.4      15 Dec 2018 22:45    TPro  7.7  /  Alb  3.8  /  TBili  < 0.2<L>  /  DBili  x   /  AST  35<H>  /  ALT  15  /  AlkPhos  68  12-15 General Surgery Progress Note     Subjective: Patient was seen and examined during morning rounds. Patient intubated and unresponsive to questions on rounds.       Objective:    Physical Exam:  General: sedated, minimally responsive to external stimuli, obes  Chest/Lungs: CTAB no w/r/r  Heart: RRR no m/r/g  Abdomen: obese, soft, non-distended, midline surgical incision with scant serosanguinous discharge  Extremities: warm, well perfused  Skin: no rash  Neuro: sedated  Lines in place: A-line, iraheta, NG tube, ETT, PIV    MEDICATIONS  (STANDING):  lactated ringers. 1000 milliLiter(s) (75 mL/Hr) IV Continuous <Continuous>    MEDICATIONS  (PRN):      Vital Signs Last 24 Hrs  T(C): 36.8 (16 Dec 2018 01:13), Max: 37.2 (15 Dec 2018 22:08)  T(F): 98.3 (16 Dec 2018 01:13), Max: 99 (15 Dec 2018 22:08)  HR: 100 (16 Dec 2018 01:13) (93 - 100)  BP: 140/81 (15 Dec 2018 23:02) (132/95 - 156/108)  BP(mean): --  RR: 17 (15 Dec 2018 23:02) (16 - 29)  SpO2: 97% (16 Dec 2018 01:13) (95% - 98%)        LABS:                        12.6   8.26  )-----------( 259      ( 15 Dec 2018 22:45 )             41.3     12-15    139  |  103  |  22  ----------------------------<  134<H>  5.2   |  24  |  1.00    Ca    9.4      15 Dec 2018 22:45    TPro  7.7  /  Alb  3.8  /  TBili  < 0.2<L>  /  DBili  x   /  AST  35<H>  /  ALT  15  /  AlkPhos  68  12-15

## 2018-12-16 NOTE — PROGRESS NOTE ADULT - ASSESSMENT
63 year old woman with COPD on home O2, uncontrolled DM, RA on chronic prednisone, pulmonary HTN, s/p c-sectionx2 presents with SBO concerning for compromised bowel. OR planning for ex-lap, lysis of adhesions, possible bowel resection and/or ostomy    - Admit to surgical service with SICU consultation for postoperative care   - NGT/IVF  - Dispo: pending hospital course 63F w/ multiple respiratory comorbidities p/w SBO s/p ex-lap with findings of healthy bowel, admitted to SICU care for respiratory care.      - f/u SICU consultation for respiratory managment  - monitor appropriate IVF rate to avoid causing edema  - NGT/IVF  - wean vent as tolerated  - Dispo: pending hospital course

## 2018-12-16 NOTE — BRIEF OPERATIVE NOTE - PROCEDURE
<<-----Click on this checkbox to enter Procedure Exploratory laparotomy with lysis of adhesions  12/16/2018    Active  [id number]

## 2018-12-17 LAB
BUN SERPL-MCNC: 22 MG/DL — SIGNIFICANT CHANGE UP (ref 7–23)
CA-I BLD-SCNC: 1.07 MMOL/L — SIGNIFICANT CHANGE UP (ref 1.03–1.23)
CALCIUM SERPL-MCNC: 8.4 MG/DL — SIGNIFICANT CHANGE UP (ref 8.4–10.5)
CHLORIDE SERPL-SCNC: 106 MMOL/L — SIGNIFICANT CHANGE UP (ref 98–107)
CO2 SERPL-SCNC: 24 MMOL/L — SIGNIFICANT CHANGE UP (ref 22–31)
CREAT SERPL-MCNC: 1.15 MG/DL — SIGNIFICANT CHANGE UP (ref 0.5–1.3)
GLUCOSE BLDC GLUCOMTR-MCNC: 102 MG/DL — HIGH (ref 70–99)
GLUCOSE BLDC GLUCOMTR-MCNC: 108 MG/DL — HIGH (ref 70–99)
GLUCOSE BLDC GLUCOMTR-MCNC: 115 MG/DL — HIGH (ref 70–99)
GLUCOSE BLDC GLUCOMTR-MCNC: 88 MG/DL — SIGNIFICANT CHANGE UP (ref 70–99)
GLUCOSE BLDC GLUCOMTR-MCNC: 89 MG/DL — SIGNIFICANT CHANGE UP (ref 70–99)
GLUCOSE SERPL-MCNC: 125 MG/DL — HIGH (ref 70–99)
HBA1C BLD-MCNC: 7.1 % — HIGH (ref 4–5.6)
HCT VFR BLD CALC: 30.4 % — LOW (ref 34.5–45)
HGB BLD-MCNC: 9.3 G/DL — LOW (ref 11.5–15.5)
MAGNESIUM SERPL-MCNC: 1.8 MG/DL — SIGNIFICANT CHANGE UP (ref 1.6–2.6)
MCHC RBC-ENTMCNC: 30.6 % — LOW (ref 32–36)
MCHC RBC-ENTMCNC: 30.6 PG — SIGNIFICANT CHANGE UP (ref 27–34)
MCV RBC AUTO: 100 FL — SIGNIFICANT CHANGE UP (ref 80–100)
NRBC # FLD: 0 — SIGNIFICANT CHANGE UP
PHOSPHATE SERPL-MCNC: 3.7 MG/DL — SIGNIFICANT CHANGE UP (ref 2.5–4.5)
PLATELET # BLD AUTO: 218 K/UL — SIGNIFICANT CHANGE UP (ref 150–400)
PMV BLD: 10.4 FL — SIGNIFICANT CHANGE UP (ref 7–13)
POTASSIUM SERPL-MCNC: 4 MMOL/L — SIGNIFICANT CHANGE UP (ref 3.5–5.3)
POTASSIUM SERPL-SCNC: 4 MMOL/L — SIGNIFICANT CHANGE UP (ref 3.5–5.3)
RBC # BLD: 3.04 M/UL — LOW (ref 3.8–5.2)
RBC # FLD: 15 % — HIGH (ref 10.3–14.5)
SODIUM SERPL-SCNC: 145 MMOL/L — SIGNIFICANT CHANGE UP (ref 135–145)
WBC # BLD: 8.17 K/UL — SIGNIFICANT CHANGE UP (ref 3.8–10.5)
WBC # FLD AUTO: 8.17 K/UL — SIGNIFICANT CHANGE UP (ref 3.8–10.5)

## 2018-12-17 PROCEDURE — 71045 X-RAY EXAM CHEST 1 VIEW: CPT | Mod: 26

## 2018-12-17 PROCEDURE — 76604 US EXAM CHEST: CPT | Mod: 26

## 2018-12-17 PROCEDURE — 99233 SBSQ HOSP IP/OBS HIGH 50: CPT

## 2018-12-17 RX ORDER — NALOXONE HYDROCHLORIDE 4 MG/.1ML
0.1 SPRAY NASAL
Qty: 0 | Refills: 0 | Status: DISCONTINUED | OUTPATIENT
Start: 2018-12-17 | End: 2018-12-22

## 2018-12-17 RX ORDER — LEVOTHYROXINE SODIUM 125 MCG
125 TABLET ORAL DAILY
Qty: 0 | Refills: 0 | Status: DISCONTINUED | OUTPATIENT
Start: 2018-12-17 | End: 2018-12-20

## 2018-12-17 RX ORDER — AMLODIPINE BESYLATE 2.5 MG/1
5 TABLET ORAL DAILY
Qty: 0 | Refills: 0 | Status: DISCONTINUED | OUTPATIENT
Start: 2018-12-17 | End: 2018-12-21

## 2018-12-17 RX ORDER — MAGNESIUM SULFATE 500 MG/ML
2 VIAL (ML) INJECTION ONCE
Qty: 0 | Refills: 0 | Status: COMPLETED | OUTPATIENT
Start: 2018-12-17 | End: 2018-12-17

## 2018-12-17 RX ORDER — SIMVASTATIN 20 MG/1
20 TABLET, FILM COATED ORAL AT BEDTIME
Qty: 0 | Refills: 0 | Status: DISCONTINUED | OUTPATIENT
Start: 2018-12-17 | End: 2018-12-21

## 2018-12-17 RX ORDER — HYDROCORTISONE 20 MG
50 TABLET ORAL EVERY 8 HOURS
Qty: 0 | Refills: 0 | Status: DISCONTINUED | OUTPATIENT
Start: 2018-12-17 | End: 2018-12-19

## 2018-12-17 RX ORDER — INSULIN GLARGINE 100 [IU]/ML
14 INJECTION, SOLUTION SUBCUTANEOUS AT BEDTIME
Qty: 0 | Refills: 0 | Status: DISCONTINUED | OUTPATIENT
Start: 2018-12-18 | End: 2018-12-18

## 2018-12-17 RX ORDER — PANTOPRAZOLE SODIUM 20 MG/1
40 TABLET, DELAYED RELEASE ORAL
Qty: 0 | Refills: 0 | Status: DISCONTINUED | OUTPATIENT
Start: 2018-12-17 | End: 2018-12-19

## 2018-12-17 RX ORDER — INSULIN GLARGINE 100 [IU]/ML
7 INJECTION, SOLUTION SUBCUTANEOUS AT BEDTIME
Qty: 0 | Refills: 0 | Status: DISCONTINUED | OUTPATIENT
Start: 2018-12-17 | End: 2018-12-18

## 2018-12-17 RX ORDER — TRAMADOL HYDROCHLORIDE 50 MG/1
75 TABLET ORAL THREE TIMES A DAY
Qty: 0 | Refills: 0 | Status: DISCONTINUED | OUTPATIENT
Start: 2018-12-18 | End: 2018-12-21

## 2018-12-17 RX ORDER — GABAPENTIN 400 MG/1
300 CAPSULE ORAL ONCE
Qty: 0 | Refills: 0 | Status: COMPLETED | OUTPATIENT
Start: 2018-12-17 | End: 2018-12-17

## 2018-12-17 RX ORDER — GABAPENTIN 400 MG/1
300 CAPSULE ORAL
Qty: 0 | Refills: 0 | Status: DISCONTINUED | OUTPATIENT
Start: 2018-12-18 | End: 2018-12-19

## 2018-12-17 RX ORDER — METOPROLOL TARTRATE 50 MG
25 TABLET ORAL
Qty: 0 | Refills: 0 | Status: DISCONTINUED | OUTPATIENT
Start: 2018-12-17 | End: 2018-12-21

## 2018-12-17 RX ORDER — HYDROMORPHONE HYDROCHLORIDE 2 MG/ML
0.5 INJECTION INTRAMUSCULAR; INTRAVENOUS; SUBCUTANEOUS
Qty: 0 | Refills: 0 | Status: DISCONTINUED | OUTPATIENT
Start: 2018-12-17 | End: 2018-12-22

## 2018-12-17 RX ORDER — KETOROLAC TROMETHAMINE 30 MG/ML
30 SYRINGE (ML) INJECTION ONCE
Qty: 0 | Refills: 0 | Status: DISCONTINUED | OUTPATIENT
Start: 2018-12-17 | End: 2018-12-17

## 2018-12-17 RX ORDER — CHLORHEXIDINE GLUCONATE 213 G/1000ML
1 SOLUTION TOPICAL
Qty: 0 | Refills: 0 | Status: DISCONTINUED | OUTPATIENT
Start: 2018-12-17 | End: 2018-12-26

## 2018-12-17 RX ORDER — ONDANSETRON 8 MG/1
4 TABLET, FILM COATED ORAL EVERY 6 HOURS
Qty: 0 | Refills: 0 | Status: DISCONTINUED | OUTPATIENT
Start: 2018-12-17 | End: 2018-12-22

## 2018-12-17 RX ORDER — HYDROMORPHONE HYDROCHLORIDE 2 MG/ML
30 INJECTION INTRAMUSCULAR; INTRAVENOUS; SUBCUTANEOUS
Qty: 0 | Refills: 0 | Status: DISCONTINUED | OUTPATIENT
Start: 2018-12-17 | End: 2018-12-22

## 2018-12-17 RX ORDER — OXYBUTYNIN CHLORIDE 5 MG
5 TABLET ORAL
Qty: 0 | Refills: 0 | Status: DISCONTINUED | OUTPATIENT
Start: 2018-12-17 | End: 2018-12-21

## 2018-12-17 RX ORDER — TRAMADOL HYDROCHLORIDE 50 MG/1
75 TABLET ORAL ONCE
Qty: 0 | Refills: 0 | Status: DISCONTINUED | OUTPATIENT
Start: 2018-12-17 | End: 2018-12-17

## 2018-12-17 RX ORDER — FUROSEMIDE 40 MG
20 TABLET ORAL ONCE
Qty: 0 | Refills: 0 | Status: COMPLETED | OUTPATIENT
Start: 2018-12-17 | End: 2018-12-17

## 2018-12-17 RX ADMIN — TRAMADOL HYDROCHLORIDE 75 MILLIGRAM(S): 50 TABLET ORAL at 16:05

## 2018-12-17 RX ADMIN — BUDESONIDE AND FORMOTEROL FUMARATE DIHYDRATE 2 PUFF(S): 160; 4.5 AEROSOL RESPIRATORY (INHALATION) at 22:10

## 2018-12-17 RX ADMIN — Medication 50 MILLIGRAM(S): at 13:49

## 2018-12-17 RX ADMIN — HYDROMORPHONE HYDROCHLORIDE 1 MILLIGRAM(S): 2 INJECTION INTRAMUSCULAR; INTRAVENOUS; SUBCUTANEOUS at 10:29

## 2018-12-17 RX ADMIN — HEPARIN SODIUM 5000 UNIT(S): 5000 INJECTION INTRAVENOUS; SUBCUTANEOUS at 13:49

## 2018-12-17 RX ADMIN — HYDROMORPHONE HYDROCHLORIDE 30 MILLILITER(S): 2 INJECTION INTRAMUSCULAR; INTRAVENOUS; SUBCUTANEOUS at 11:51

## 2018-12-17 RX ADMIN — Medication 400 MILLIGRAM(S): at 03:37

## 2018-12-17 RX ADMIN — HYDROMORPHONE HYDROCHLORIDE 1 MILLIGRAM(S): 2 INJECTION INTRAMUSCULAR; INTRAVENOUS; SUBCUTANEOUS at 10:44

## 2018-12-17 RX ADMIN — Medication 30 MILLIGRAM(S): at 21:29

## 2018-12-17 RX ADMIN — HEPARIN SODIUM 5000 UNIT(S): 5000 INJECTION INTRAVENOUS; SUBCUTANEOUS at 21:29

## 2018-12-17 RX ADMIN — SIMVASTATIN 20 MILLIGRAM(S): 20 TABLET, FILM COATED ORAL at 21:29

## 2018-12-17 RX ADMIN — PANTOPRAZOLE SODIUM 40 MILLIGRAM(S): 20 TABLET, DELAYED RELEASE ORAL at 11:22

## 2018-12-17 RX ADMIN — Medication 50 GRAM(S): at 05:20

## 2018-12-17 RX ADMIN — Medication 50 MILLIGRAM(S): at 21:28

## 2018-12-17 RX ADMIN — GABAPENTIN 300 MILLIGRAM(S): 400 CAPSULE ORAL at 17:56

## 2018-12-17 RX ADMIN — Medication 50 MILLIGRAM(S): at 05:20

## 2018-12-17 RX ADMIN — Medication 1000 MILLIGRAM(S): at 04:00

## 2018-12-17 RX ADMIN — TIOTROPIUM BROMIDE 1 CAPSULE(S): 18 CAPSULE ORAL; RESPIRATORY (INHALATION) at 10:10

## 2018-12-17 RX ADMIN — HEPARIN SODIUM 5000 UNIT(S): 5000 INJECTION INTRAVENOUS; SUBCUTANEOUS at 06:46

## 2018-12-17 RX ADMIN — Medication 20 MILLIGRAM(S): at 13:52

## 2018-12-17 RX ADMIN — Medication 5 MILLIGRAM(S): at 17:56

## 2018-12-17 RX ADMIN — BUDESONIDE AND FORMOTEROL FUMARATE DIHYDRATE 2 PUFF(S): 160; 4.5 AEROSOL RESPIRATORY (INHALATION) at 08:57

## 2018-12-17 RX ADMIN — Medication 5 MILLIGRAM(S): at 05:20

## 2018-12-17 RX ADMIN — Medication 400 MILLIGRAM(S): at 08:27

## 2018-12-17 RX ADMIN — HYDROMORPHONE HYDROCHLORIDE 30 MILLILITER(S): 2 INJECTION INTRAMUSCULAR; INTRAVENOUS; SUBCUTANEOUS at 19:09

## 2018-12-17 RX ADMIN — Medication 30 MILLIGRAM(S): at 23:00

## 2018-12-17 RX ADMIN — Medication 5 MILLIGRAM(S): at 12:15

## 2018-12-17 RX ADMIN — Medication 25 MILLIGRAM(S): at 17:56

## 2018-12-17 NOTE — PROGRESS NOTE ADULT - SUBJECTIVE AND OBJECTIVE BOX
SICU Daily Progress Note  =====================================================  Interval/Overnight Events: Pt extubated in AM. Restarted on home meds. No acute events overnight       HPI:  63F hx COPD (baseline 3L home O2)/pulmonary HTN/DM/RA/HTN/CVA/ x2 presented to this hospital's ER with abdominal pain for one day. It was described as a cramping, sharp mid-abdominal pain, constant and gradually worsening, with no known inciting factor. Associated with NBNB emesis, number of episodes not specified. Last BM was yesterday morning, but no flatus since.    In the ER, a CT a/p showed an SBO with transition point in the lower abdomen with associated interloop fluid and mesenteric edema. The patient was taken to the OR emergently on concern that these findings represented early bowel compromise. Intra-operatively she was found to have numerous adhesions, but the intestine itself only demonstrated an area of congestion and dilitation w/o evidence of ischemia, no transition point. No bowel was resected, and the abdomen was closed. She was left intubated post-operatively due to anticipated difficulty weaning from the vent due to her pulmonary history, and the SICU was consulted for further cardiopulmonary management. Of note, she is on steroids chronically and received stress-dose steroids preoperatively.    Allergies: No Known Allergies      MEDICATIONS:   --------------------------------------------------------------------------------------  Neurologic Medications  acetaminophen  IVPB .. 1000 milliGRAM(s) IV Intermittent once  acetaminophen  IVPB .. 1000 milliGRAM(s) IV Intermittent once  HYDROmorphone  Injectable 0.5 milliGRAM(s) IV Push every 3 hours PRN Moderate Pain (4 - 6)  HYDROmorphone  Injectable 1 milliGRAM(s) IV Push every 4 hours PRN Severe Pain (7 - 10)    Respiratory Medications  ALBUTerol    90 MICROgram(s) HFA Inhaler 1 Puff(s) Inhalation every 4 hours PRN Shortness of Breath and/or Wheezing  buDESOnide 160 MICROgram(s)/formoterol 4.5 MICROgram(s) Inhaler 2 Puff(s) Inhalation two times a day  tiotropium 18 MICROgram(s) Capsule 1 Capsule(s) Inhalation daily    Cardiovascular Medications  metoprolol tartrate Injectable 5 milliGRAM(s) IV Push every 6 hours    Gastrointestinal Medications  pantoprazole  Injectable 40 milliGRAM(s) IV Push daily    Genitourinary Medications    Hematologic/Oncologic Medications  heparin  Injectable 5000 Unit(s) SubCutaneous every 8 hours  influenza   Vaccine 0.5 milliLiter(s) IntraMuscular once    Antimicrobial/Immunologic Medications    Endocrine/Metabolic Medications  hydrocortisone sodium succinate Injectable 50 milliGRAM(s) IV Push every 6 hours  insulin glargine Injectable (LANTUS) 14 Unit(s) SubCutaneous at bedtime  insulin lispro (HumaLOG) corrective regimen sliding scale   SubCutaneous every 6 hours  levothyroxine Injectable 95 MICROGram(s) IV Push at bedtime    Topical/Other Medications    --------------------------------------------------------------------------------------    VITAL SIGNS, INS/OUTS (last 24 hours):  --------------------------------------------------------------------------------------    T(C): 37.2 (18 @ 00:00), Max: 37.3 (18 @ 15:00)  HR: 96 (18 @ 00:00) (80 - 110)  BP: 124/63 (18 @ 17:00) (103/66 - 154/86)  ABP: 139/97 (18 @ 00:00) (94/69 - 161/106)  ABP(mean): 114 (18 @ 00:00) (79 - 127)  RR: 18 (18 @ 00:00) (8 - 19)  SpO2: 98% (18 @ 00:00) (89% - 100%)  Wt(kg): --  CVP(mm Hg): --      12-15 @ 07:01  -   @ 07:00  --------------------------------------------------------  IN:    fentaNYL Infusion.: 26.5 mL    lactated ringers.: 375 mL    propofol Infusion: 82 mL  Total IN: 483.5 mL    OUT:    Indwelling Catheter - Urethral: 170 mL    Nasoenteral Tube: 200 mL  Total OUT: 370 mL    Total NET: 113.5 mL       @ 07:01  -   @ 00:43  --------------------------------------------------------  IN:    fentaNYL Infusion.: 19 mL    IV PiggyBack: 100 mL    lactated ringers.: 125 mL    propofol Infusion: 39.6 mL  Total IN: 283.6 mL    OUT:    Indwelling Catheter - Urethral: 1705 mL    Nasoenteral Tube: 300 mL  Total OUT: 2005 mL    Total NET: -1721.4 mL    --------------------------------------------------------------------------------------    EXAM    NEUROLOGY  RASS:   	GCS:    Exam: Normal, NAD, alert, oriented x3, no focal deficits    HEENT  Exam: Normocephalic, atraumatic    RESPIRATORY  Exam: Lungs clear to auscultation, Normal expansion/effort.       CARDIOVASCULAR  Exam: Regular rate and rhythm    GI/NUTRITION  Exam: Abdomen soft, appropriately tender, Non-distended.  midline incision c/d/i   Current Diet:  NPO    VASCULAR  Exam: Extremities warm, pink, well-perfused.    MUSCULOSKELETAL  Exam: All extremities moving spontaneously without limitations    SKIN  Exam: Good skin turgor    METABOLIC/FLUIDS/ELECTROLYTES      HEMATOLOGIC  [x] VTE Prophylaxis: heparin  Injectable 5000 Unit(s) SubCutaneous every 8 hours    Transfusions:	[] PRBC	[] Platelets		[] FFP	[] Cryoprecipitate    INFECTIOUS DISEASE  Antimicrobials/Immunologic Medications:  influenza   Vaccine 0.5 milliLiter(s) IntraMuscular once      Tubes/Lines/Drains   [x] Peripheral IV  [] Central Venous Line     	[] R	[] L	[] IJ	[] Fem	[] SC	Date Placed:   [] Arterial Line		[] R	[] L	[] Fem	[] Rad	[] Ax	Date Placed:   [] PICC		[] Midline		[] Mediport  [] Urinary Catheter		Date Placed:   [x] Necessity of urinary, arterial, and venous catheters discussed    LABS  --------------------------------------------------------------------------------------    CBC ( @ 04:59)                              10.5<L>                         5.51    )----------------(  233        --    % Neutrophils, --    % Lymphocytes, ANC: --                                  33.6<L>  CBC (12-15 @ 22:45)                              12.6                           8.26    )----------------(  259        69.8  % Neutrophils, 20.9  % Lymphocytes, ANC: 5.76                                41.3      BMP ( @ 04:59)             142     |  103     |  25<H> 		Ca++ --      Ca 8.6                ---------------------------------( 140<H>		Mg 1.5<L>             4.1     |  25      |  1.15  			Ph 3.8     BMP (12-15 @ 22:45)             139     |  103     |  22    		Ca++ --      Ca 9.4                ---------------------------------( 134<H>		Mg --                 5.2     |  24      |  1.00  			Ph --        LFTs (12-15 @ 16:04)      TPro 7.7 / Alb 3.8 / TBili < 0.2<L> / DBili -- / AST 35<H> / ALT 15 / AlkPhos 68    Coags ( @ 04:59)  aPTT 24.6<L> / INR 1.11 / PT 12.4  Coags (12-15 @ 21:22)  aPTT 24.9<L> / INR 0.98 / PT 11.2      ABG ( @ 04:59)     7.47<H> / 37 / 185<H> / 27<H> / 2.6 / 97.8%     Lactate: 1.5     VBG (12-15 @ 22:40)     7.29<L> / 59<H> / 56<H> / 24 / 1.4 / 82.3%     Lactate: 1.6

## 2018-12-17 NOTE — PROGRESS NOTE ADULT - SUBJECTIVE AND OBJECTIVE BOX
POST ANESTHESIA EVALUATION    63y Female POSTOP DAY 1      MENTAL STATUS: Patient participation [x  ] Awake     [  ] Arousable     [  ] Sedated    AIRWAY PATENCY: [ x ] Satisfactory  [  ] Other:     Vital Signs Last 24 Hrs  T(C): 37.1 (17 Dec 2018 12:00), Max: 37.3 (16 Dec 2018 15:00)  T(F): 98.7 (17 Dec 2018 12:00), Max: 99.2 (16 Dec 2018 15:00)  HR: 90 (17 Dec 2018 12:00) (84 - 110)  BP: 124/63 (16 Dec 2018 17:00) (124/63 - 132/70)  BP(mean): 78 (16 Dec 2018 17:00) (78 - 85)  RR: 13 (17 Dec 2018 12:00) (12 - 19)  SpO2: 97% (17 Dec 2018 12:00) (94% - 99%)  I&O's Summary    16 Dec 2018 07:01  -  17 Dec 2018 07:00  --------------------------------------------------------  IN: 283.6 mL / OUT: 2670 mL / NET: -2386.4 mL    17 Dec 2018 07:01  -  17 Dec 2018 12:14  --------------------------------------------------------  IN: 100 mL / OUT: 590 mL / NET: -490 mL          NAUSEA/ VOMITTING:  [ x ] NONE  [  ] CONTROLLED [  ] OTHER     PAIN: [  ] CONTROLLED WITH CURRENT REGIMEN  [ x ] OTHER    [ x ] NO APPARENT ANESTHESIA COMPLICATIONS      Comments: patient reports pain while on acetaminophen and prn dilaudid. D/W ICU team. Team will start patient on pain PCA and restart home pain meds in the evening. Team will also order for ketorolac.

## 2018-12-17 NOTE — PROGRESS NOTE ADULT - ATTENDING COMMENTS
63F w/ multiple respiratory comorbidities p/w SBO s/p ex-lap with findings of healthy bowel    Neuro:  - Ordering PCA  - Toradol and IV Tylenol for pain control  - Will restarted home medications when NGT is out  - Dilaudid breakthrough      Respiratory  - Chest ultrasound today  -supplemental O2 PRN   -continue Proventil Spiriva and Symbicort     Cardiac  -Continue lopressor IV q6   -switch to PO antihypertensives once diet advanced       GI  -NPO/ NGT - Clamp trial today  -Continue home ppi      Renal  - Will dose lasix pending ultrasound results  -monitor I&O's  -maintain iraheta   -Trend BUN/Cr today 22/1.15    Heme  -CAROLYNE  -SQH for DVT PPX    ID  -no active issues  -monitor CBC     Endo  - Hydrocortisone 50q6h will wean today to q8h.   - Continue home dose of synthroid   -SSI    Dispo: Floor    CCDx. respiratory abnormality, obesity BMI 47, sp ExLap.  The patient is not a critical care patient with no life threatening hemodynamic and metabolic instability in SICU.  I have personally interviewed when possible and examined the patient, reviewed data and laboratory tests/x-rays and all pertinent electronic images.  I was physically present for the key portions of the evaluation and management (E/M) service provided.   The SICU team has a constant risk benefit analyzes discussion with the primary team, all consultants, House Staff and PA's on all decisions.  These diagnoses are unrelated to the surgical procedure noted above.  I meet with family if needed to get further history, discuss the case and make care decisions for this patient who might not be able to participate.  Time involved in performance of separately billable procedures was not counted toward my critical care time. There is no overlap.  I spent 55-75 minutes of critical care time for the diagnoses, assessment, plan and interventions.

## 2018-12-17 NOTE — PROGRESS NOTE ADULT - ASSESSMENT
63F w/ multiple respiratory comorbidities p/w SBO s/p ex-lap with findings of healthy bowel    Neuro:  -PRN pain control     Respiratory  -supplemental O2 PRN   -continue Proventil Spiriva and Symbicort     Cardiac  -Continue lopressor IV q6   -switch to PO antihypertensives once diet advanced       GI  -NPO/ NGT  -Continue home ppi      Renal  -monitor I&O's  -maintain iraheta   -Trend BUN/Cr    Heme  -CAROLYNE  -SQH for DVT PPX    ID  -no active issues  -monitor CBC     Endo  -convert to home dose steroids  -started on home dose of synthroid   -SSI    Dispo: SICU 63F w/ multiple respiratory comorbidities p/w SBO s/p ex-lap with findings of healthy bowel    Neuro:  - Ordering PCA  - Toradol and IV Tylenol for pain control  - Will restarted home medications when NGT is out  - Dilaudid breakthrough      Respiratory  -supplemental O2 PRN   -continue Proventil Spiriva and Symbicort     Cardiac  -Continue lopressor IV q6   -switch to PO antihypertensives once diet advanced       GI  -NPO/ NGT - Clamp trial today  -Continue home ppi      Renal  -monitor I&O's  -maintain iraheta   -Trend BUN/Cr today 22/1.15    Heme  -CAROLYNE  -SQH for DVT PPX    ID  -no active issues  -monitor CBC     Endo  - Hydrocortisone 50q6h will wean today to q8h.   - Continue home dose of synthroid   -SSI    Dispo: Floor 63F w/ multiple respiratory comorbidities p/w SBO s/p ex-lap with findings of healthy bowel    Neuro:  - Ordering PCA  - Toradol and IV Tylenol for pain control  - Will restarted home medications when NGT is out  - Dilaudid breakthrough      Respiratory  - Chest ultrasound today  -supplemental O2 PRN   -continue Proventil Spiriva and Symbicort     Cardiac  -Continue lopressor IV q6   -switch to PO antihypertensives once diet advanced       GI  -NPO/ NGT - Clamp trial today  -Continue home ppi      Renal  - Will dose lasix pending ultrasound results  -monitor I&O's  -maintain iraheta   -Trend BUN/Cr today 22/1.15    Heme  -CAROLYNE  -SQH for DVT PPX    ID  -no active issues  -monitor CBC     Endo  - Hydrocortisone 50q6h will wean today to q8h.   - Continue home dose of synthroid   -SSI    Dispo: Floor

## 2018-12-17 NOTE — PROGRESS NOTE ADULT - SUBJECTIVE AND OBJECTIVE BOX
S: Patient seen and examined.  Extubated overnight without any acute events.  On 4L nasal canula, tolerating well. Restarted on home medications.      O: Vital Signs  T(C): 37.2 (12-17 @ 08:00), Max: 37.3 (12-16 @ 15:00)  HR: 85 (12-17 @ 09:00) (84 - 110)  BP: 124/63 (12-16 @ 17:00) (124/63 - 148/82)  RR: 13 (12-17 @ 09:00) (12 - 19)  SpO2: 98% (12-17 @ 09:00) (94% - 99%)  12-16-18 @ 07:01  -  12-17-18 @ 07:00  --------------------------------------------------------  IN: 283.6 mL / OUT: 2670 mL / NET: -2386.4 mL    12-17-18 @ 07:01  -  12-17-18 @ 10:24  --------------------------------------------------------  IN: 100 mL / OUT: 300 mL / NET: -200 mL      General: NAD  NGT in place  Resp: tolerating 4L nasal canula, saturating >/=94%  CVS: regular rate and rhythm (tachy to 110 overnight, HR currently 85)  Abdomen: obese, soft, with midline staples and retention sutures intact, scant serosanguinous discharge appreciated on dressing, which was replaced.  Appropriately tender to palpation.                            9.3    8.17  )-----------( 218      ( 17 Dec 2018 03:50 )             30.4   12-17    145  |  106  |  22  ----------------------------<  125<H>  4.0   |  24  |  1.15    Ca    8.4      17 Dec 2018 03:50  Phos  3.7     12-17  Mg     1.8     12-17    TPro  7.7  /  Alb  3.8  /  TBili  < 0.2<L>  /  DBili  x   /  AST  35<H>  /  ALT  15  /  AlkPhos  68  12-15

## 2018-12-17 NOTE — PROGRESS NOTE ADULT - ASSESSMENT
63F w/ multiple respiratory comorbidities p/w SBO s/p ex-lap with findings of healthy bowel, admitted to SICU care for respiratory care.    - NGT clamp trial   - NPO   - Continue to monitor respiratory status  - DVT ppx  - appreciate care as per SICU  - Dispo: pending hospital course

## 2018-12-18 LAB
BUN SERPL-MCNC: 21 MG/DL — SIGNIFICANT CHANGE UP (ref 7–23)
CALCIUM SERPL-MCNC: 8.4 MG/DL — SIGNIFICANT CHANGE UP (ref 8.4–10.5)
CHLORIDE SERPL-SCNC: 101 MMOL/L — SIGNIFICANT CHANGE UP (ref 98–107)
CO2 SERPL-SCNC: 27 MMOL/L — SIGNIFICANT CHANGE UP (ref 22–31)
CREAT SERPL-MCNC: 1.07 MG/DL — SIGNIFICANT CHANGE UP (ref 0.5–1.3)
GLUCOSE BLDC GLUCOMTR-MCNC: 104 MG/DL — HIGH (ref 70–99)
GLUCOSE BLDC GLUCOMTR-MCNC: 121 MG/DL — HIGH (ref 70–99)
GLUCOSE BLDC GLUCOMTR-MCNC: 127 MG/DL — HIGH (ref 70–99)
GLUCOSE BLDC GLUCOMTR-MCNC: 69 MG/DL — LOW (ref 70–99)
GLUCOSE BLDC GLUCOMTR-MCNC: 75 MG/DL — SIGNIFICANT CHANGE UP (ref 70–99)
GLUCOSE BLDC GLUCOMTR-MCNC: 78 MG/DL — SIGNIFICANT CHANGE UP (ref 70–99)
GLUCOSE BLDC GLUCOMTR-MCNC: 81 MG/DL — SIGNIFICANT CHANGE UP (ref 70–99)
GLUCOSE SERPL-MCNC: 118 MG/DL — HIGH (ref 70–99)
HCT VFR BLD CALC: 33.2 % — LOW (ref 34.5–45)
HGB BLD-MCNC: 10.1 G/DL — LOW (ref 11.5–15.5)
MAGNESIUM SERPL-MCNC: 2.7 MG/DL — HIGH (ref 1.6–2.6)
MCHC RBC-ENTMCNC: 30.4 % — LOW (ref 32–36)
MCHC RBC-ENTMCNC: 31 PG — SIGNIFICANT CHANGE UP (ref 27–34)
MCV RBC AUTO: 101.8 FL — HIGH (ref 80–100)
NRBC # FLD: 0 — SIGNIFICANT CHANGE UP
PHOSPHATE SERPL-MCNC: 2.3 MG/DL — LOW (ref 2.5–4.5)
PLATELET # BLD AUTO: 243 K/UL — SIGNIFICANT CHANGE UP (ref 150–400)
PMV BLD: 10.2 FL — SIGNIFICANT CHANGE UP (ref 7–13)
POTASSIUM SERPL-MCNC: 3.8 MMOL/L — SIGNIFICANT CHANGE UP (ref 3.5–5.3)
POTASSIUM SERPL-SCNC: 3.8 MMOL/L — SIGNIFICANT CHANGE UP (ref 3.5–5.3)
RBC # BLD: 3.26 M/UL — LOW (ref 3.8–5.2)
RBC # FLD: 14.9 % — HIGH (ref 10.3–14.5)
SODIUM SERPL-SCNC: 145 MMOL/L — SIGNIFICANT CHANGE UP (ref 135–145)
WBC # BLD: 7.79 K/UL — SIGNIFICANT CHANGE UP (ref 3.8–10.5)
WBC # FLD AUTO: 7.79 K/UL — SIGNIFICANT CHANGE UP (ref 3.8–10.5)

## 2018-12-18 PROCEDURE — 71045 X-RAY EXAM CHEST 1 VIEW: CPT | Mod: 26

## 2018-12-18 RX ORDER — POTASSIUM PHOSPHATE, MONOBASIC POTASSIUM PHOSPHATE, DIBASIC 236; 224 MG/ML; MG/ML
15 INJECTION, SOLUTION INTRAVENOUS ONCE
Qty: 0 | Refills: 0 | Status: COMPLETED | OUTPATIENT
Start: 2018-12-18 | End: 2018-12-18

## 2018-12-18 RX ORDER — INSULIN GLARGINE 100 [IU]/ML
9 INJECTION, SOLUTION SUBCUTANEOUS AT BEDTIME
Qty: 0 | Refills: 0 | Status: DISCONTINUED | OUTPATIENT
Start: 2018-12-18 | End: 2018-12-19

## 2018-12-18 RX ORDER — DEXTROSE 50 % IN WATER 50 %
25 SYRINGE (ML) INTRAVENOUS ONCE
Qty: 0 | Refills: 0 | Status: DISCONTINUED | OUTPATIENT
Start: 2018-12-18 | End: 2019-01-14

## 2018-12-18 RX ORDER — DEXTROSE 50 % IN WATER 50 %
25 SYRINGE (ML) INTRAVENOUS ONCE
Qty: 0 | Refills: 0 | Status: DISCONTINUED | OUTPATIENT
Start: 2018-12-18 | End: 2018-12-18

## 2018-12-18 RX ORDER — MAGNESIUM SULFATE 500 MG/ML
2 VIAL (ML) INJECTION ONCE
Qty: 0 | Refills: 0 | Status: COMPLETED | OUTPATIENT
Start: 2018-12-18 | End: 2018-12-18

## 2018-12-18 RX ORDER — INSULIN GLARGINE 100 [IU]/ML
7 INJECTION, SOLUTION SUBCUTANEOUS ONCE
Qty: 0 | Refills: 0 | Status: COMPLETED | OUTPATIENT
Start: 2018-12-18 | End: 2018-12-18

## 2018-12-18 RX ORDER — FUROSEMIDE 40 MG
40 TABLET ORAL DAILY
Qty: 0 | Refills: 0 | Status: DISCONTINUED | OUTPATIENT
Start: 2018-12-18 | End: 2018-12-20

## 2018-12-18 RX ORDER — SODIUM CHLORIDE 9 MG/ML
1000 INJECTION INTRAMUSCULAR; INTRAVENOUS; SUBCUTANEOUS
Qty: 0 | Refills: 0 | Status: DISCONTINUED | OUTPATIENT
Start: 2018-12-18 | End: 2018-12-19

## 2018-12-18 RX ORDER — DEXTROSE 50 % IN WATER 50 %
15 SYRINGE (ML) INTRAVENOUS ONCE
Qty: 0 | Refills: 0 | Status: COMPLETED | OUTPATIENT
Start: 2018-12-18 | End: 2018-12-18

## 2018-12-18 RX ADMIN — HEPARIN SODIUM 5000 UNIT(S): 5000 INJECTION INTRAVENOUS; SUBCUTANEOUS at 14:20

## 2018-12-18 RX ADMIN — TRAMADOL HYDROCHLORIDE 75 MILLIGRAM(S): 50 TABLET ORAL at 21:35

## 2018-12-18 RX ADMIN — Medication 50 GRAM(S): at 05:05

## 2018-12-18 RX ADMIN — GABAPENTIN 300 MILLIGRAM(S): 400 CAPSULE ORAL at 18:11

## 2018-12-18 RX ADMIN — Medication 5 MILLIGRAM(S): at 18:12

## 2018-12-18 RX ADMIN — CHLORHEXIDINE GLUCONATE 1 APPLICATION(S): 213 SOLUTION TOPICAL at 10:41

## 2018-12-18 RX ADMIN — PANTOPRAZOLE SODIUM 40 MILLIGRAM(S): 20 TABLET, DELAYED RELEASE ORAL at 06:00

## 2018-12-18 RX ADMIN — HYDROMORPHONE HYDROCHLORIDE 30 MILLILITER(S): 2 INJECTION INTRAMUSCULAR; INTRAVENOUS; SUBCUTANEOUS at 07:16

## 2018-12-18 RX ADMIN — POTASSIUM PHOSPHATE, MONOBASIC POTASSIUM PHOSPHATE, DIBASIC 62.5 MILLIMOLE(S): 236; 224 INJECTION, SOLUTION INTRAVENOUS at 16:54

## 2018-12-18 RX ADMIN — Medication 125 MICROGRAM(S): at 06:11

## 2018-12-18 RX ADMIN — Medication 25 MILLIGRAM(S): at 05:04

## 2018-12-18 RX ADMIN — HYDROMORPHONE HYDROCHLORIDE 30 MILLILITER(S): 2 INJECTION INTRAMUSCULAR; INTRAVENOUS; SUBCUTANEOUS at 20:33

## 2018-12-18 RX ADMIN — Medication 5 MILLIGRAM(S): at 05:03

## 2018-12-18 RX ADMIN — AMLODIPINE BESYLATE 5 MILLIGRAM(S): 2.5 TABLET ORAL at 10:34

## 2018-12-18 RX ADMIN — SODIUM CHLORIDE 10 MILLILITER(S): 9 INJECTION INTRAMUSCULAR; INTRAVENOUS; SUBCUTANEOUS at 08:20

## 2018-12-18 RX ADMIN — HYDROMORPHONE HYDROCHLORIDE 30 MILLILITER(S): 2 INJECTION INTRAMUSCULAR; INTRAVENOUS; SUBCUTANEOUS at 08:20

## 2018-12-18 RX ADMIN — Medication 50 MILLIGRAM(S): at 21:40

## 2018-12-18 RX ADMIN — Medication 40 MILLIGRAM(S): at 05:59

## 2018-12-18 RX ADMIN — TRAMADOL HYDROCHLORIDE 75 MILLIGRAM(S): 50 TABLET ORAL at 05:04

## 2018-12-18 RX ADMIN — BUDESONIDE AND FORMOTEROL FUMARATE DIHYDRATE 2 PUFF(S): 160; 4.5 AEROSOL RESPIRATORY (INHALATION) at 10:34

## 2018-12-18 RX ADMIN — Medication 25 MILLIGRAM(S): at 18:12

## 2018-12-18 RX ADMIN — Medication 15 GRAM(S): at 20:39

## 2018-12-18 RX ADMIN — HEPARIN SODIUM 5000 UNIT(S): 5000 INJECTION INTRAVENOUS; SUBCUTANEOUS at 21:39

## 2018-12-18 RX ADMIN — TRAMADOL HYDROCHLORIDE 75 MILLIGRAM(S): 50 TABLET ORAL at 06:12

## 2018-12-18 RX ADMIN — Medication 50 MILLIGRAM(S): at 05:59

## 2018-12-18 RX ADMIN — GABAPENTIN 300 MILLIGRAM(S): 400 CAPSULE ORAL at 05:03

## 2018-12-18 RX ADMIN — TRAMADOL HYDROCHLORIDE 75 MILLIGRAM(S): 50 TABLET ORAL at 14:21

## 2018-12-18 RX ADMIN — TRAMADOL HYDROCHLORIDE 75 MILLIGRAM(S): 50 TABLET ORAL at 15:10

## 2018-12-18 RX ADMIN — TRAMADOL HYDROCHLORIDE 75 MILLIGRAM(S): 50 TABLET ORAL at 21:34

## 2018-12-18 RX ADMIN — BUDESONIDE AND FORMOTEROL FUMARATE DIHYDRATE 2 PUFF(S): 160; 4.5 AEROSOL RESPIRATORY (INHALATION) at 21:40

## 2018-12-18 RX ADMIN — SIMVASTATIN 20 MILLIGRAM(S): 20 TABLET, FILM COATED ORAL at 21:34

## 2018-12-18 RX ADMIN — INSULIN GLARGINE 7 UNIT(S): 100 INJECTION, SOLUTION SUBCUTANEOUS at 00:38

## 2018-12-18 RX ADMIN — SODIUM CHLORIDE 10 MILLILITER(S): 9 INJECTION INTRAMUSCULAR; INTRAVENOUS; SUBCUTANEOUS at 16:54

## 2018-12-18 NOTE — CHART NOTE - NSCHARTNOTEFT_GEN_A_CORE
GENERAL SURGERY FLOOR TRANSFER NOTE    63y Female transferred to floor from SICU    SUBJECTIVE:  Complaining of some abd. pain, worsen by movement. OOB in chair. Denies nausea or vomiting. Passing flatus. On nasal cannula, denies SOB.     OBJECTIVE:    T(C): 36.6 (12-18-18 @ 14:08), Max: 37.6 (12-17-18 @ 20:00)  HR: 77 (12-18-18 @ 14:08) (76 - 95)  BP: 133/80 (12-18-18 @ 14:08) (125/76 - 138/84)  RR: 18 (12-18-18 @ 14:08) (14 - 26)  SpO2: 100% (12-18-18 @ 14:08) (95% - 100%)  Wt(kg): --      I&O's Summary    17 Dec 2018 07:01  -  18 Dec 2018 07:00  --------------------------------------------------------  IN: 100 mL / OUT: 2475 mL / NET: -2375 mL    18 Dec 2018 07:01  -  18 Dec 2018 15:59  --------------------------------------------------------  IN: 0 mL / OUT: 600 mL / NET: -600 mL                              10.1   7.79  )-----------( 243      ( 18 Dec 2018 14:53 )             33.2       12-18    145  |  101  |  21  ----------------------------<  118<H>  3.8   |  27  |  1.07    Ca    8.4      18 Dec 2018 14:53  Phos  2.3     12-18  Mg     2.7     12-18        MEDICATIONS  (STANDING):  amLODIPine   Tablet 5 milliGRAM(s) Oral daily  buDESOnide 160 MICROgram(s)/formoterol 4.5 MICROgram(s) Inhaler 2 Puff(s) Inhalation two times a day  chlorhexidine 4% Liquid 1 Application(s) Topical <User Schedule>  furosemide    Tablet 40 milliGRAM(s) Oral daily  gabapentin 300 milliGRAM(s) Oral two times a day  heparin  Injectable 5000 Unit(s) SubCutaneous every 8 hours  hydrocortisone sodium succinate Injectable 50 milliGRAM(s) IV Push every 8 hours  HYDROmorphone PCA (1 mG/mL) 30 milliLiter(s) PCA Continuous PCA Continuous  influenza   Vaccine 0.5 milliLiter(s) IntraMuscular once  insulin glargine Injectable (LANTUS) 14 Unit(s) SubCutaneous at bedtime  insulin lispro (HumaLOG) corrective regimen sliding scale   SubCutaneous every 6 hours  levothyroxine 125 MICROGram(s) Oral daily  metoprolol tartrate 25 milliGRAM(s) Oral two times a day  oxybutynin 5 milliGRAM(s) Oral two times a day  pantoprazole    Tablet 40 milliGRAM(s) Oral before breakfast  potassium phosphate IVPB 15 milliMole(s) IV Intermittent once  simvastatin 20 milliGRAM(s) Oral at bedtime  sodium chloride 0.9%. 1000 milliLiter(s) (10 mL/Hr) IV Continuous <Continuous>  tiotropium 18 MICROgram(s) Capsule 1 Capsule(s) Inhalation daily  traMADol 75 milliGRAM(s) Oral three times a day    MEDICATIONS  (PRN):  ALBUTerol    90 MICROgram(s) HFA Inhaler 1 Puff(s) Inhalation every 4 hours PRN Shortness of Breath and/or Wheezing  HYDROmorphone PCA (1 mG/mL) Rescue Clinician Bolus 0.5 milliGRAM(s) IV Push every 15 minutes PRN for Pain Scale GREATER THAN 6  naloxone Injectable 0.1 milliGRAM(s) IV Push every 3 minutes PRN For ANY of the following changes in patient status:  A. RR LESS THAN 10 breaths per minute, B. Oxygen saturation LESS THAN 90%, C. Sedation score of 6  ondansetron Injectable 4 milliGRAM(s) IV Push every 6 hours PRN Nausea        PHYSICAL EXAM:  General Appearance: Appears well, NAD  Neck: Supple  Chest: Equal expansion bilaterally, equal breath sounds  CV: Pulse regular presently  Abdomen: Soft, nontense, appropriate incisional tenderness, dressings clean and dry and intact      63F w/ multiple respiratory comorbidities p/w SBO s/p ex-lap with findings of healthy bowel, admitted to SICU care for respiratory care now extubated and awaiting bed on the floor.    - Advance to sips and chips as tolerated  - Continue to monitor respiratory status  - DVT ppx  - monitor GI fxn     [id number]

## 2018-12-18 NOTE — PROGRESS NOTE ADULT - SUBJECTIVE AND OBJECTIVE BOX
SUBJECTIVE: Pt seen and examined bedside this morning. She is in NAD and comfortable. Swelling noticed in L forearm, previous IV site. Patient on 3 L NC at 95% O2 sat. Patient states abdominal discomfort less today than yesterday, feels like she needs to have a BM but has not passed gas or had a BM yet.    General Appearance: Appears well, NAD  Neck: Supple  Chest: Equal expansion bilaterally, equal breath sounds  CV: Pulse regular presently  Abdomen: Soft, nontense, appropriate incisional tenderness, dressings clean and dry and intact  Extremities: Grossly symmetric, SCD's in place     Vital Signs Last 24 Hrs  T(C): 37.1 (18 Dec 2018 08:01), Max: 37.6 (17 Dec 2018 20:00)  T(F): 98.8 (18 Dec 2018 08:01), Max: 99.6 (17 Dec 2018 20:00)  HR: 77 (18 Dec 2018 08:01) (77 - 96)  BP: 126/84 (18 Dec 2018 08:01) (125/76 - 138/84)  BP(mean): 91 (18 Dec 2018 05:03) (84 - 95)  RR: 16 (18 Dec 2018 08:01) (12 - 26)  SpO2: 99% (18 Dec 2018 08:01) (94% - 100%)    I&O's Summary    17 Dec 2018 07:01  -  18 Dec 2018 07:00  --------------------------------------------------------  IN: 100 mL / OUT: 2475 mL / NET: -2375 mL      I&O's Detail    17 Dec 2018 07:01  -  18 Dec 2018 07:00  --------------------------------------------------------  IN:    IV PiggyBack: 100 mL  Total IN: 100 mL    OUT:    Indwelling Catheter - Urethral: 2475 mL  Total OUT: 2475 mL    Total NET: -2375 mL          MEDICATIONS  (STANDING):  amLODIPine   Tablet 5 milliGRAM(s) Oral daily  buDESOnide 160 MICROgram(s)/formoterol 4.5 MICROgram(s) Inhaler 2 Puff(s) Inhalation two times a day  chlorhexidine 4% Liquid 1 Application(s) Topical <User Schedule>  furosemide    Tablet 40 milliGRAM(s) Oral daily  gabapentin 300 milliGRAM(s) Oral two times a day  heparin  Injectable 5000 Unit(s) SubCutaneous every 8 hours  hydrocortisone sodium succinate Injectable 50 milliGRAM(s) IV Push every 8 hours  HYDROmorphone PCA (1 mG/mL) 30 milliLiter(s) PCA Continuous PCA Continuous  influenza   Vaccine 0.5 milliLiter(s) IntraMuscular once  insulin glargine Injectable (LANTUS) 14 Unit(s) SubCutaneous at bedtime  insulin lispro (HumaLOG) corrective regimen sliding scale   SubCutaneous every 6 hours  levothyroxine 125 MICROGram(s) Oral daily  metoprolol tartrate 25 milliGRAM(s) Oral two times a day  oxybutynin 5 milliGRAM(s) Oral two times a day  pantoprazole    Tablet 40 milliGRAM(s) Oral before breakfast  simvastatin 20 milliGRAM(s) Oral at bedtime  sodium chloride 0.9%. 1000 milliLiter(s) (10 mL/Hr) IV Continuous <Continuous>  tiotropium 18 MICROgram(s) Capsule 1 Capsule(s) Inhalation daily  traMADol 75 milliGRAM(s) Oral three times a day    MEDICATIONS  (PRN):  ALBUTerol    90 MICROgram(s) HFA Inhaler 1 Puff(s) Inhalation every 4 hours PRN Shortness of Breath and/or Wheezing  HYDROmorphone PCA (1 mG/mL) Rescue Clinician Bolus 0.5 milliGRAM(s) IV Push every 15 minutes PRN for Pain Scale GREATER THAN 6  naloxone Injectable 0.1 milliGRAM(s) IV Push every 3 minutes PRN For ANY of the following changes in patient status:  A. RR LESS THAN 10 breaths per minute, B. Oxygen saturation LESS THAN 90%, C. Sedation score of 6  ondansetron Injectable 4 milliGRAM(s) IV Push every 6 hours PRN Nausea      LABS:                        9.3    8.17  )-----------( 218      ( 17 Dec 2018 03:50 )             30.4     12-17    145  |  106  |  22  ----------------------------<  125<H>  4.0   |  24  |  1.15    Ca    8.4      17 Dec 2018 03:50  Phos  3.7     12-17  Mg     1.8     12-17            RADIOLOGY & ADDITIONAL STUDIES:

## 2018-12-18 NOTE — PROGRESS NOTE ADULT - ASSESSMENT
63F w/ multiple respiratory comorbidities p/w SBO s/p ex-lap with findings of healthy bowel    Neuro:  - PCA for pain control  - Toradol and IV Tylenol for pain control  - Restarted home medications  - Dilaudid breakthrough      Respiratory  - Chest ultrasound without effusions or B-lines  -supplemental O2 PRN   -continue Proventil Spiriva and Symbicort     Cardiac  -Continue lopressor IV q6   -switch to PO antihypertensives once diet advanced       GI  - Sips and Chips  -Continue home ppi      Renal  - C/w home dose lasix  -monitor I&O's  -maintain iraheta   -Trend BUN/Cr today 22/1.15    Heme  -CAROLYNE  -SQH for DVT PPX    ID  -no active issues  -monitor CBC     Endo  - Hydrocortisone 50q6h will weaned to q8h.   - Continue home dose of synthroid   -SSI    Dispo: Floor

## 2018-12-18 NOTE — PROGRESS NOTE ADULT - SUBJECTIVE AND OBJECTIVE BOX
SICU Daily Progress Note  =====================================================  Interval/Overnight Events: NGT discontinued, patient started on sips and oral medications. PCA started for pain control. Bedside chest ultrasound without effusions or B-lines.       HPI:  63F hx COPD (baseline 3L home O2)/pulmonary HTN/DM/RA/HTN/CVA/ x2 presented to this hospital's ER with abdominal pain for one day. It was described as a cramping, sharp mid-abdominal pain, constant and gradually worsening, with no known inciting factor. Associated with NBNB emesis, number of episodes not specified. Last BM was yesterday morning, but no flatus since.    In the ER, a CT a/p showed an SBO with transition point in the lower abdomen with associated interloop fluid and mesenteric edema. The patient was taken to the OR emergently on concern that these findings represented early bowel compromise. Intra-operatively she was found to have numerous adhesions, but the intestine itself only demonstrated an area of congestion and dilitation w/o evidence of ischemia, no transition point. No bowel was resected, and the abdomen was closed. She was left intubated post-operatively due to anticipated difficulty weaning from the vent due to her pulmonary history, and the SICU was consulted for further cardiopulmonary management. Of note, she is on steroids chronically and received stress-dose steroids preoperatively.      Vital Signs Last 24 Hrs  T(C): 37.1 (18 Dec 2018 00:00), Max: 37.6 (17 Dec 2018 20:00)  T(F): 98.8 (18 Dec 2018 00:00), Max: 99.6 (17 Dec 2018 20:00)  HR: 82 (18 Dec 2018 00:00) (82 - 96)  BP: 138/84 (17 Dec 2018 21:03) (138/84 - 138/84)  BP(mean): 95 (17 Dec 2018 21:03) (95 - 95)  RR: 15 (18 Dec 2018 00:00) (12 - 26)  SpO2: 98% (18 Dec 2018 00:00) (94% - 100%)    Physical Exam:  General Appearance: Appears well, NAD  Respiratory: No labored breathing  CV: Pulse regularly present  Abdomen: Soft, nontense, obese, tender, retention sutures in place, wound CDI, nondistended      LABS:                        9.3    8.17  )-----------( 218      ( 17 Dec 2018 03:50 )             30.4         145  |  106  |  22  ----------------------------<  125<H>  4.0   |  24  |  1.15    Ca    8.4      17 Dec 2018 03:50  Phos  3.7       Mg     1.8           PT/INR - ( 16 Dec 2018 04:59 )   PT: 12.4 SEC;   INR: 1.11          PTT - ( 16 Dec 2018 04:59 )  PTT:24.6 SEC      INs and OUTs:    18 @ 07:01  -  18 @ 07:00  --------------------------------------------------------  IN: 283.6 mL / OUT: 2670 mL / NET: -2386.4 mL    18 @ 07:01  -  18 @ 00:40  --------------------------------------------------------  IN: 100 mL / OUT: 2225 mL / NET: -2125 mL

## 2018-12-18 NOTE — PROGRESS NOTE ADULT - SUBJECTIVE AND OBJECTIVE BOX
Anesthesia Pain Management Service    SUBJECTIVE: Patient is doing well with IV PCA and no significant problems reported.    Pain Scale Score	At rest: __3_ 	With Activity: ___ 	[X ] Refer to charted pain scores    THERAPY:    [ ] IV PCA Morphine		[ ] 5 mg/mL	[ ] 1 mg/mL  [X ] IV PCA Hydromorphone	[ ] 5 mg/mL	[X ] 1 mg/mL  [ ] IV PCA Fentanyl		[ ] 50 micrograms/mL    Demand dose __0.2_ lockout __6_ (minutes) Continuous Rate _0__ Total: __2.4_  mg used (in past 24 hours)      MEDICATIONS  (STANDING):  amLODIPine   Tablet 5 milliGRAM(s) Oral daily  buDESOnide 160 MICROgram(s)/formoterol 4.5 MICROgram(s) Inhaler 2 Puff(s) Inhalation two times a day  chlorhexidine 4% Liquid 1 Application(s) Topical <User Schedule>  furosemide    Tablet 40 milliGRAM(s) Oral daily  gabapentin 300 milliGRAM(s) Oral two times a day  heparin  Injectable 5000 Unit(s) SubCutaneous every 8 hours  hydrocortisone sodium succinate Injectable 50 milliGRAM(s) IV Push every 8 hours  HYDROmorphone PCA (1 mG/mL) 30 milliLiter(s) PCA Continuous PCA Continuous  influenza   Vaccine 0.5 milliLiter(s) IntraMuscular once  insulin glargine Injectable (LANTUS) 14 Unit(s) SubCutaneous at bedtime  insulin lispro (HumaLOG) corrective regimen sliding scale   SubCutaneous every 6 hours  levothyroxine 125 MICROGram(s) Oral daily  metoprolol tartrate 25 milliGRAM(s) Oral two times a day  oxybutynin 5 milliGRAM(s) Oral two times a day  pantoprazole    Tablet 40 milliGRAM(s) Oral before breakfast  simvastatin 20 milliGRAM(s) Oral at bedtime  sodium chloride 0.9%. 1000 milliLiter(s) (10 mL/Hr) IV Continuous <Continuous>  tiotropium 18 MICROgram(s) Capsule 1 Capsule(s) Inhalation daily  traMADol 75 milliGRAM(s) Oral three times a day    MEDICATIONS  (PRN):  ALBUTerol    90 MICROgram(s) HFA Inhaler 1 Puff(s) Inhalation every 4 hours PRN Shortness of Breath and/or Wheezing  HYDROmorphone PCA (1 mG/mL) Rescue Clinician Bolus 0.5 milliGRAM(s) IV Push every 15 minutes PRN for Pain Scale GREATER THAN 6  naloxone Injectable 0.1 milliGRAM(s) IV Push every 3 minutes PRN For ANY of the following changes in patient status:  A. RR LESS THAN 10 breaths per minute, B. Oxygen saturation LESS THAN 90%, C. Sedation score of 6  ondansetron Injectable 4 milliGRAM(s) IV Push every 6 hours PRN Nausea      OBJECTIVE: laying in bed     Sedation Score:	[ X] Alert	[ ] Drowsy 	[ ] Arousable	[ ] Asleep	[ ] Unresponsive    Side Effects:	[X ] None	[ ] Nausea	[ ] Vomiting	[ ] Pruritus  		[ ] Other:    Vital Signs Last 24 Hrs  T(C): 37.2 (18 Dec 2018 10:31), Max: 37.6 (17 Dec 2018 20:00)  T(F): 98.9 (18 Dec 2018 10:31), Max: 99.6 (17 Dec 2018 20:00)  HR: 76 (18 Dec 2018 10:31) (76 - 96)  BP: 138/82 (18 Dec 2018 10:31) (125/76 - 138/84)  BP(mean): 91 (18 Dec 2018 05:03) (84 - 95)  RR: 18 (18 Dec 2018 10:31) (12 - 26)  SpO2: 97% (18 Dec 2018 10:31) (95% - 100%)    ASSESSMENT/ PLAN    Therapy to  be:	[ X] Continue   [ ] Discontinued   [ ] Change to prn Analgesics    Documentation and Verification of current medications:   [X] Done	[ ] Not done, not elligible    Comments: pt. on tramadol 75mg TID(home dose 300mg daily) with PCA pump continue current therapy

## 2018-12-18 NOTE — PROGRESS NOTE ADULT - ASSESSMENT
63F w/ multiple respiratory comorbidities p/w SBO s/p ex-lap with findings of healthy bowel, admitted to SICU care for respiratory care now extubated and awaiting bed on the floor.    - Advance to sips and chips as tolerated  - Continue to monitor respiratory status  - DVT ppx  - appreciate care as per SICU  - Await GI function

## 2018-12-18 NOTE — PROGRESS NOTE ADULT - SUBJECTIVE AND OBJECTIVE BOX
Anesthesia Pain Management Service- Attending Addendum    SUBJECTIVE: Pt doing well with IV PCA without problems reported.    Therapy:	  [ X] IV PCA	   [ ] Epidural           [ ] s/p Spinal Opoid              [ ] Postpartum infusion	  [ ] Patient controlled regional anesthesia (PCRA)    [ ] prn Analgesics    Allergies    No Known Allergies    Intolerances      MEDICATIONS  (STANDING):  amLODIPine   Tablet 5 milliGRAM(s) Oral daily  buDESOnide 160 MICROgram(s)/formoterol 4.5 MICROgram(s) Inhaler 2 Puff(s) Inhalation two times a day  chlorhexidine 4% Liquid 1 Application(s) Topical <User Schedule>  furosemide    Tablet 40 milliGRAM(s) Oral daily  gabapentin 300 milliGRAM(s) Oral two times a day  heparin  Injectable 5000 Unit(s) SubCutaneous every 8 hours  hydrocortisone sodium succinate Injectable 50 milliGRAM(s) IV Push every 8 hours  HYDROmorphone PCA (1 mG/mL) 30 milliLiter(s) PCA Continuous PCA Continuous  influenza   Vaccine 0.5 milliLiter(s) IntraMuscular once  insulin glargine Injectable (LANTUS) 14 Unit(s) SubCutaneous at bedtime  insulin lispro (HumaLOG) corrective regimen sliding scale   SubCutaneous every 6 hours  levothyroxine 125 MICROGram(s) Oral daily  metoprolol tartrate 25 milliGRAM(s) Oral two times a day  oxybutynin 5 milliGRAM(s) Oral two times a day  pantoprazole    Tablet 40 milliGRAM(s) Oral before breakfast  simvastatin 20 milliGRAM(s) Oral at bedtime  sodium chloride 0.9%. 1000 milliLiter(s) (10 mL/Hr) IV Continuous <Continuous>  tiotropium 18 MICROgram(s) Capsule 1 Capsule(s) Inhalation daily  traMADol 75 milliGRAM(s) Oral three times a day    MEDICATIONS  (PRN):  ALBUTerol    90 MICROgram(s) HFA Inhaler 1 Puff(s) Inhalation every 4 hours PRN Shortness of Breath and/or Wheezing  HYDROmorphone PCA (1 mG/mL) Rescue Clinician Bolus 0.5 milliGRAM(s) IV Push every 15 minutes PRN for Pain Scale GREATER THAN 6  naloxone Injectable 0.1 milliGRAM(s) IV Push every 3 minutes PRN For ANY of the following changes in patient status:  A. RR LESS THAN 10 breaths per minute, B. Oxygen saturation LESS THAN 90%, C. Sedation score of 6  ondansetron Injectable 4 milliGRAM(s) IV Push every 6 hours PRN Nausea      OBJECTIVE:   [X] No new signs     [ ] Other:    Side Effects:  [X ] None			[ ] Other:    Assessment of Catheter Site:		[ ] Intact		[ ] Other:    ASSESSMENT/PLAN  [ X] Continue current therapy    [ ] Therapy changed to:    [ ] IV PCA       [ ] Epidural     [ ] prn Analgesics     Comments:

## 2018-12-19 DIAGNOSIS — E03.9 HYPOTHYROIDISM, UNSPECIFIED: ICD-10-CM

## 2018-12-19 DIAGNOSIS — E11.649 TYPE 2 DIABETES MELLITUS WITH HYPOGLYCEMIA WITHOUT COMA: ICD-10-CM

## 2018-12-19 DIAGNOSIS — Z79.52 LONG TERM (CURRENT) USE OF SYSTEMIC STEROIDS: ICD-10-CM

## 2018-12-19 LAB
ALBUMIN SERPL ELPH-MCNC: 3.6 G/DL — SIGNIFICANT CHANGE UP (ref 3.3–5)
ALP SERPL-CCNC: 61 U/L — SIGNIFICANT CHANGE UP (ref 40–120)
ALT FLD-CCNC: 13 U/L — SIGNIFICANT CHANGE UP (ref 4–33)
AST SERPL-CCNC: 22 U/L — SIGNIFICANT CHANGE UP (ref 4–32)
BILIRUB SERPL-MCNC: 0.2 MG/DL — SIGNIFICANT CHANGE UP (ref 0.2–1.2)
BUN SERPL-MCNC: 19 MG/DL — SIGNIFICANT CHANGE UP (ref 7–23)
CALCIUM SERPL-MCNC: 8.5 MG/DL — SIGNIFICANT CHANGE UP (ref 8.4–10.5)
CHLORIDE SERPL-SCNC: 100 MMOL/L — SIGNIFICANT CHANGE UP (ref 98–107)
CO2 SERPL-SCNC: 29 MMOL/L — SIGNIFICANT CHANGE UP (ref 22–31)
CREAT SERPL-MCNC: 1 MG/DL — SIGNIFICANT CHANGE UP (ref 0.5–1.3)
GLUCOSE BLDC GLUCOMTR-MCNC: 110 MG/DL — HIGH (ref 70–99)
GLUCOSE BLDC GLUCOMTR-MCNC: 111 MG/DL — HIGH (ref 70–99)
GLUCOSE BLDC GLUCOMTR-MCNC: 131 MG/DL — HIGH (ref 70–99)
GLUCOSE BLDC GLUCOMTR-MCNC: 132 MG/DL — HIGH (ref 70–99)
GLUCOSE BLDC GLUCOMTR-MCNC: 90 MG/DL — SIGNIFICANT CHANGE UP (ref 70–99)
GLUCOSE BLDC GLUCOMTR-MCNC: 97 MG/DL — SIGNIFICANT CHANGE UP (ref 70–99)
GLUCOSE SERPL-MCNC: 115 MG/DL — HIGH (ref 70–99)
HCT VFR BLD CALC: 32.3 % — LOW (ref 34.5–45)
HGB BLD-MCNC: 9.8 G/DL — LOW (ref 11.5–15.5)
MAGNESIUM SERPL-MCNC: 2.8 MG/DL — HIGH (ref 1.6–2.6)
MCHC RBC-ENTMCNC: 30.3 % — LOW (ref 32–36)
MCHC RBC-ENTMCNC: 31 PG — SIGNIFICANT CHANGE UP (ref 27–34)
MCV RBC AUTO: 102.2 FL — HIGH (ref 80–100)
NRBC # FLD: 0 — SIGNIFICANT CHANGE UP
PHOSPHATE SERPL-MCNC: 2.7 MG/DL — SIGNIFICANT CHANGE UP (ref 2.5–4.5)
PLATELET # BLD AUTO: 259 K/UL — SIGNIFICANT CHANGE UP (ref 150–400)
PMV BLD: 10.3 FL — SIGNIFICANT CHANGE UP (ref 7–13)
POTASSIUM SERPL-MCNC: 4.1 MMOL/L — SIGNIFICANT CHANGE UP (ref 3.5–5.3)
POTASSIUM SERPL-SCNC: 4.1 MMOL/L — SIGNIFICANT CHANGE UP (ref 3.5–5.3)
PROT SERPL-MCNC: 6.9 G/DL — SIGNIFICANT CHANGE UP (ref 6–8.3)
RBC # BLD: 3.16 M/UL — LOW (ref 3.8–5.2)
RBC # FLD: 14.8 % — HIGH (ref 10.3–14.5)
SODIUM SERPL-SCNC: 143 MMOL/L — SIGNIFICANT CHANGE UP (ref 135–145)
WBC # BLD: 6.97 K/UL — SIGNIFICANT CHANGE UP (ref 3.8–10.5)
WBC # FLD AUTO: 6.97 K/UL — SIGNIFICANT CHANGE UP (ref 3.8–10.5)

## 2018-12-19 PROCEDURE — 99223 1ST HOSP IP/OBS HIGH 75: CPT | Mod: GC

## 2018-12-19 RX ORDER — SODIUM CHLORIDE 9 MG/ML
1000 INJECTION, SOLUTION INTRAVENOUS
Qty: 0 | Refills: 0 | Status: DISCONTINUED | OUTPATIENT
Start: 2018-12-19 | End: 2018-12-19

## 2018-12-19 RX ORDER — POLYETHYLENE GLYCOL 3350 17 G/17G
17 POWDER, FOR SOLUTION ORAL DAILY
Qty: 0 | Refills: 0 | Status: DISCONTINUED | OUTPATIENT
Start: 2018-12-19 | End: 2018-12-20

## 2018-12-19 RX ORDER — PANTOPRAZOLE SODIUM 20 MG/1
40 TABLET, DELAYED RELEASE ORAL ONCE
Qty: 0 | Refills: 0 | Status: COMPLETED | OUTPATIENT
Start: 2018-12-19 | End: 2018-12-21

## 2018-12-19 RX ORDER — SODIUM CHLORIDE 9 MG/ML
1000 INJECTION, SOLUTION INTRAVENOUS
Qty: 0 | Refills: 0 | Status: DISCONTINUED | OUTPATIENT
Start: 2018-12-19 | End: 2018-12-24

## 2018-12-19 RX ORDER — ACETAMINOPHEN 500 MG
1000 TABLET ORAL ONCE
Qty: 0 | Refills: 0 | Status: COMPLETED | OUTPATIENT
Start: 2018-12-19 | End: 2018-12-19

## 2018-12-19 RX ORDER — SODIUM CHLORIDE 0.65 %
1 AEROSOL, SPRAY (ML) NASAL EVERY 6 HOURS
Qty: 0 | Refills: 0 | Status: DISCONTINUED | OUTPATIENT
Start: 2018-12-19 | End: 2019-01-06

## 2018-12-19 RX ORDER — TIZANIDINE 4 MG/1
1 TABLET ORAL EVERY 6 HOURS
Qty: 0 | Refills: 0 | Status: DISCONTINUED | OUTPATIENT
Start: 2018-12-19 | End: 2018-12-21

## 2018-12-19 RX ORDER — ACETAMINOPHEN 500 MG
1000 TABLET ORAL ONCE
Qty: 0 | Refills: 0 | Status: COMPLETED | OUTPATIENT
Start: 2018-12-20 | End: 2018-12-20

## 2018-12-19 RX ORDER — GABAPENTIN 400 MG/1
400 CAPSULE ORAL THREE TIMES A DAY
Qty: 0 | Refills: 0 | Status: DISCONTINUED | OUTPATIENT
Start: 2018-12-19 | End: 2018-12-21

## 2018-12-19 RX ORDER — INSULIN GLARGINE 100 [IU]/ML
7 INJECTION, SOLUTION SUBCUTANEOUS AT BEDTIME
Qty: 0 | Refills: 0 | Status: DISCONTINUED | OUTPATIENT
Start: 2018-12-19 | End: 2018-12-20

## 2018-12-19 RX ORDER — INSULIN LISPRO 100/ML
VIAL (ML) SUBCUTANEOUS EVERY 6 HOURS
Qty: 0 | Refills: 0 | Status: DISCONTINUED | OUTPATIENT
Start: 2018-12-19 | End: 2018-12-23

## 2018-12-19 RX ORDER — DOCUSATE SODIUM 100 MG
100 CAPSULE ORAL THREE TIMES A DAY
Qty: 0 | Refills: 0 | Status: DISCONTINUED | OUTPATIENT
Start: 2018-12-19 | End: 2018-12-20

## 2018-12-19 RX ORDER — CLOPIDOGREL BISULFATE 75 MG/1
75 TABLET, FILM COATED ORAL DAILY
Qty: 0 | Refills: 0 | Status: DISCONTINUED | OUTPATIENT
Start: 2018-12-19 | End: 2018-12-21

## 2018-12-19 RX ORDER — FLUTICASONE PROPIONATE 50 MCG
1 SPRAY, SUSPENSION NASAL
Qty: 0 | Refills: 0 | Status: DISCONTINUED | OUTPATIENT
Start: 2018-12-19 | End: 2019-01-06

## 2018-12-19 RX ORDER — SENNA PLUS 8.6 MG/1
2 TABLET ORAL AT BEDTIME
Qty: 0 | Refills: 0 | Status: DISCONTINUED | OUTPATIENT
Start: 2018-12-19 | End: 2018-12-21

## 2018-12-19 RX ORDER — GLUCAGON INJECTION, SOLUTION 0.5 MG/.1ML
1 INJECTION, SOLUTION SUBCUTANEOUS ONCE
Qty: 0 | Refills: 0 | Status: DISCONTINUED | OUTPATIENT
Start: 2018-12-19 | End: 2019-01-03

## 2018-12-19 RX ADMIN — SODIUM CHLORIDE 30 MILLILITER(S): 9 INJECTION, SOLUTION INTRAVENOUS at 18:41

## 2018-12-19 RX ADMIN — TRAMADOL HYDROCHLORIDE 75 MILLIGRAM(S): 50 TABLET ORAL at 15:10

## 2018-12-19 RX ADMIN — Medication 400 MILLIGRAM(S): at 10:02

## 2018-12-19 RX ADMIN — TRAMADOL HYDROCHLORIDE 75 MILLIGRAM(S): 50 TABLET ORAL at 22:15

## 2018-12-19 RX ADMIN — TIZANIDINE 1 MILLIGRAM(S): 4 TABLET ORAL at 18:43

## 2018-12-19 RX ADMIN — GABAPENTIN 400 MILLIGRAM(S): 400 CAPSULE ORAL at 21:38

## 2018-12-19 RX ADMIN — TRAMADOL HYDROCHLORIDE 75 MILLIGRAM(S): 50 TABLET ORAL at 06:44

## 2018-12-19 RX ADMIN — Medication 1000 MILLIGRAM(S): at 10:45

## 2018-12-19 RX ADMIN — Medication 40 MILLIGRAM(S): at 06:43

## 2018-12-19 RX ADMIN — Medication 100 MILLIGRAM(S): at 21:38

## 2018-12-19 RX ADMIN — Medication 10 MILLIGRAM(S): at 18:43

## 2018-12-19 RX ADMIN — HYDROMORPHONE HYDROCHLORIDE 30 MILLILITER(S): 2 INJECTION INTRAMUSCULAR; INTRAVENOUS; SUBCUTANEOUS at 20:31

## 2018-12-19 RX ADMIN — HYDROMORPHONE HYDROCHLORIDE 30 MILLILITER(S): 2 INJECTION INTRAMUSCULAR; INTRAVENOUS; SUBCUTANEOUS at 08:18

## 2018-12-19 RX ADMIN — SODIUM CHLORIDE 10 MILLILITER(S): 9 INJECTION INTRAMUSCULAR; INTRAVENOUS; SUBCUTANEOUS at 10:06

## 2018-12-19 RX ADMIN — ONDANSETRON 4 MILLIGRAM(S): 8 TABLET, FILM COATED ORAL at 18:25

## 2018-12-19 RX ADMIN — POLYETHYLENE GLYCOL 3350 17 GRAM(S): 17 POWDER, FOR SOLUTION ORAL at 14:20

## 2018-12-19 RX ADMIN — TRAMADOL HYDROCHLORIDE 75 MILLIGRAM(S): 50 TABLET ORAL at 14:23

## 2018-12-19 RX ADMIN — Medication 1000 MILLIGRAM(S): at 20:20

## 2018-12-19 RX ADMIN — AMLODIPINE BESYLATE 5 MILLIGRAM(S): 2.5 TABLET ORAL at 10:05

## 2018-12-19 RX ADMIN — TRAMADOL HYDROCHLORIDE 75 MILLIGRAM(S): 50 TABLET ORAL at 21:39

## 2018-12-19 RX ADMIN — CLOPIDOGREL BISULFATE 75 MILLIGRAM(S): 75 TABLET, FILM COATED ORAL at 14:20

## 2018-12-19 RX ADMIN — Medication 5 MILLIGRAM(S): at 06:43

## 2018-12-19 RX ADMIN — Medication 100 MILLIGRAM(S): at 14:21

## 2018-12-19 RX ADMIN — Medication 400 MILLIGRAM(S): at 19:50

## 2018-12-19 RX ADMIN — SENNA PLUS 2 TABLET(S): 8.6 TABLET ORAL at 21:38

## 2018-12-19 RX ADMIN — Medication 5 MILLIGRAM(S): at 18:44

## 2018-12-19 RX ADMIN — GABAPENTIN 300 MILLIGRAM(S): 400 CAPSULE ORAL at 06:43

## 2018-12-19 RX ADMIN — Medication 25 MILLIGRAM(S): at 18:44

## 2018-12-19 RX ADMIN — GABAPENTIN 400 MILLIGRAM(S): 400 CAPSULE ORAL at 14:21

## 2018-12-19 RX ADMIN — Medication 50 MILLIGRAM(S): at 06:45

## 2018-12-19 RX ADMIN — Medication 125 MICROGRAM(S): at 06:43

## 2018-12-19 RX ADMIN — CHLORHEXIDINE GLUCONATE 1 APPLICATION(S): 213 SOLUTION TOPICAL at 10:03

## 2018-12-19 RX ADMIN — HYDROMORPHONE HYDROCHLORIDE 0.5 MILLIGRAM(S): 2 INJECTION INTRAMUSCULAR; INTRAVENOUS; SUBCUTANEOUS at 09:54

## 2018-12-19 RX ADMIN — TRAMADOL HYDROCHLORIDE 75 MILLIGRAM(S): 50 TABLET ORAL at 07:15

## 2018-12-19 RX ADMIN — Medication 1000 MILLIGRAM(S): at 15:10

## 2018-12-19 RX ADMIN — SIMVASTATIN 20 MILLIGRAM(S): 20 TABLET, FILM COATED ORAL at 21:40

## 2018-12-19 RX ADMIN — PANTOPRAZOLE SODIUM 40 MILLIGRAM(S): 20 TABLET, DELAYED RELEASE ORAL at 06:45

## 2018-12-19 RX ADMIN — Medication 1 SPRAY(S): at 18:46

## 2018-12-19 RX ADMIN — Medication 25 MILLIGRAM(S): at 06:43

## 2018-12-19 RX ADMIN — BUDESONIDE AND FORMOTEROL FUMARATE DIHYDRATE 2 PUFF(S): 160; 4.5 AEROSOL RESPIRATORY (INHALATION) at 10:03

## 2018-12-19 RX ADMIN — Medication 400 MILLIGRAM(S): at 14:21

## 2018-12-19 NOTE — CONSULT NOTE ADULT - PROBLEM SELECTOR RECOMMENDATION 2
On synthroid 125 mcg with improvement of TSH from 33 (05/2018) to 23 (10/2018). Now resume on PO synthroid  - c/w synthroid 125 mcg  - recheck TFTs post discharge On synthroid 125 mcg with improvement of TSH from 33 (05/2018) to 23 (10/2018). Now resume on PO synthroid  - c/w synthroid 125 mcg for now  - TSH and FT4 with AM labs (ordered)

## 2018-12-19 NOTE — CONSULT NOTE ADULT - PROBLEM SELECTOR RECOMMENDATION 9
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 On admission, HbA1c of 7.1 on tresiba 18 units, januvia, and prandin with intermittent episodes of hypoglycemia and post-prandial hyperglycemia. Currently NPO 2/2 SBO s/p ex lap  - c/w Lantus 7 units at bedtime   - c/w fingerstick and low correctional sliding scale q6 hrs while NPO  - recommend adding D5 to IVF for now   - determine outpatient regimen based on FSGs while inpt Patient with chronic steroid induced T2DM initiated on insulin in 05/2018 for HbaA1c 10s. On admission, HbA1c of 7.1 on tresiba 18 units, januvia, and prandin with intermittent episodes of hypoglycemia and post-prandial hyperglycemia. Currently NPO 2/2 SBO s/p ex lap  - c/w Lantus 7 units at bedtime   - c/w fingerstick and low correctional sliding scale q6 hrs while NPO  - recommend adding D5 to IVF for now   - determine outpatient regimen based on FSGs while inpt  - will arrange for diabetic educator session to reinforce carb consistent diet  - f/u endocrine clinic as outpatient Patient with chronic steroid induced T2DM initiated on insulin in 05/2018 for HbaA1c 10s. On admission, HbA1c of 7.1 on tresiba 18 units, januvia, and prandin with intermittent episodes of hypoglycemia and post-prandial hyperglycemia. Currently NPO 2/2 SBO s/p ex lap  - c/w fingerstick and low correctional sliding scale q6 hrs while NPO  - consider D5 1/2 NS @ 30 cc/hr until able to start oral diet  - Lantus 9 units qhs if able to tolerate increased fluid rate  - if only able to tolerate trickle fluids, then c/w lantus 7 units  - will determine outpt regimen based on inpt req once PO diet resumed  - will arrange for diabetic educator session to reinforce carb consistent diet  - f/u endocrine clinic as outpatient Patient with chronic steroid induced T2DM initiated on insulin in 05/2018 for HbaA1c 10s. On admission, HbA1c of 7.1 on tresiba 20 units, januvia, and prandin with intermittent episodes of hypoglycemia and post-prandial hyperglycemia. Currently NPO 2/2 SBO s/p ex lap  - c/w fingerstick and low correctional sliding scale q6 hrs while NPO  - consider D5 1/2 NS @ 30 cc/hr until able to start oral diet  - Lantus 9 units qhs if able to tolerate increased fluid rate  - if only able to tolerate trickle fluids, then c/w lantus 7 units  - will determine outpt regimen based on inpt req once PO diet resumed  - will arrange for diabetic educator session to reinforce carb consistent diet  - f/u endocrine clinic as outpatient

## 2018-12-19 NOTE — CONSULT NOTE ADULT - ASSESSMENT
63 F with PMH of COPD (on home O2 (3L), hypothyroidism, T2DM (HbA1c 8s 2018, on insulin), RA on prednisone (10 mg daily x 30 years), HTN, s/p  x2 admitted for SBO s/p ex lap with lysis of adhesions, complicated by poor glycemic control with periods of hyperglycemia and hypoglycemia, currently with occasional hypoglycemia in setting of no oral intake

## 2018-12-19 NOTE — PROGRESS NOTE ADULT - ASSESSMENT
63F w/ multiple respiratory comorbidities p/w SBO s/p ex-lap with findings of healthy bowel, admitted to SICU care for respiratory care now extubated and awaiting bed on the floor.    - Advance to sips and chips as tolerated  - Continue to monitor respiratory status  - DVT ppx  - Await GI function  - d/c iraheta   - prednisone taper   - encourage OOB     [phone]

## 2018-12-19 NOTE — CONSULT NOTE ADULT - ATTENDING COMMENTS
63F on long term steroid with DM2 exacerbated by steroids reporting hypoglycemia on home regimen. Here with SBO and has been NPO for a few days. Would add dextrose to IV fluids and increase rate to 40 cc/hour to provide 1 liter of fluids per day at minimum unless she is advanced to clear liquid diet. Proceed with low dose basal insulin for now. Plan for dc on Tresiba, Januvia, Prandin finalize doses before dc. Outpatient follow up with Dr. Saul. Check TFTs.

## 2018-12-19 NOTE — CONSULT NOTE ADULT - SUBJECTIVE AND OBJECTIVE BOX
HPI:    63 F with PMH of COPD (on home O2 (3L), moderate pulmonary HTN, hypothyroidism T2DM (HbA1c 7.3 2018, on insulin), RA on prednisone (10 mg daily x 30 years), HTN, s/p CVA (no residual deficit), s/p  x2 presents with abdominal pain and multiple episodes of NBNB vomiting x 1 day. She was in her usual state of health until she developed sharp, intermittent epigastric cramping pain. She had a normal bowel movement this morning and has not passed flatus on day of admission. No recent travel or sick contacts. Patient was admitted on 12/15/2018. CT scan shows SBO with transition point in the lower abdomen with associated interloop fluid and mesenteric edema. She was admitted to the SICU and is s/p ex lap with lysis of adhesion on 2018 and has remained NPO since admission On admission, FSG in 120s. She was given Lantus 14 units on , due to FSG dropping to 80s on . Her dose was subsequently dropped to Lantus 7 units on  with FSG     Currently NPO    Endocrine hx  Patient had history of chronic prednisone use for RA with baseline dosing of 10 mg qd and uptitrated for flares. Prior to 2018, she had been on only glimepiride 1mg daily for the borderline DM, no other insulin or PO meds at that time. She was seen in 2018 by endocrine for steroid induce hyperglycemia, at which time HbA1c was in 10s. She was recommended for Tresiba 18 units qhs and sitagliptin 50 mg PO due to ease fo administration given RA affecting hands. Also at the time of evaluation in May, patient was noted to have elevated TSH to 30s, at which time Synthroid dosing was increased from 100 mcg to 125 mcg daily.     PAST MEDICAL & SURGICAL HISTORY:  Diverticulosis  CVA (Cerebral Vascular Accident)  RA (Rheumatoid Arthritis)  Tooth Abscess  Arthritis  Cigarette Smoker  Chronic Asthma  Adult Hypothyroidism  Borderline Diabetes Mellitus  High Cholesterol  H/O: HTN  Wrist Disorder: S/P Surgery  History of  Section      FAMILY HISTORY:  No pertinent family history in first degree relatives      Social History:  Former 15 pack year smoker, quit 2 years ago; no etoh or drug abuse; lives with aide, present 7 days weekly      Outpatient Medications:    MEDICATIONS  (STANDING):  acetaminophen  IVPB .. 1000 milliGRAM(s) IV Intermittent once  acetaminophen  IVPB .. 1000 milliGRAM(s) IV Intermittent once  amLODIPine   Tablet 5 milliGRAM(s) Oral daily  buDESOnide 160 MICROgram(s)/formoterol 4.5 MICROgram(s) Inhaler 2 Puff(s) Inhalation two times a day  chlorhexidine 4% Liquid 1 Application(s) Topical <User Schedule>  clopidogrel Tablet 75 milliGRAM(s) Oral daily  dextrose 50% Injectable 25 milliLiter(s) IV Push once  docusate sodium 100 milliGRAM(s) Oral three times a day  fluticasone propionate 50 MICROgram(s)/spray Nasal Spray 1 Spray(s) Both Nostrils two times a day  furosemide    Tablet 40 milliGRAM(s) Oral daily  gabapentin 300 milliGRAM(s) Oral two times a day  heparin  Injectable 5000 Unit(s) SubCutaneous every 8 hours  HYDROmorphone PCA (1 mG/mL) 30 milliLiter(s) PCA Continuous PCA Continuous  influenza   Vaccine 0.5 milliLiter(s) IntraMuscular once  insulin glargine Injectable (LANTUS) 9 Unit(s) SubCutaneous at bedtime  insulin lispro (HumaLOG) corrective regimen sliding scale   SubCutaneous every 6 hours  levothyroxine 125 MICROGram(s) Oral daily  metoprolol tartrate 25 milliGRAM(s) Oral two times a day  oxybutynin 5 milliGRAM(s) Oral two times a day  pantoprazole    Tablet 40 milliGRAM(s) Oral before breakfast  polyethylene glycol 3350 17 Gram(s) Oral daily  predniSONE   Tablet 10 milliGRAM(s) Oral two times a day  senna 2 Tablet(s) Oral at bedtime  simvastatin 20 milliGRAM(s) Oral at bedtime  sodium chloride 0.9%. 1000 milliLiter(s) (10 mL/Hr) IV Continuous <Continuous>  tiotropium 18 MICROgram(s) Capsule 1 Capsule(s) Inhalation daily  traMADol 75 milliGRAM(s) Oral three times a day    MEDICATIONS  (PRN):  ALBUTerol    90 MICROgram(s) HFA Inhaler 1 Puff(s) Inhalation every 4 hours PRN Shortness of Breath and/or Wheezing  HYDROmorphone PCA (1 mG/mL) Rescue Clinician Bolus 0.5 milliGRAM(s) IV Push every 15 minutes PRN for Pain Scale GREATER THAN 6  naloxone Injectable 0.1 milliGRAM(s) IV Push every 3 minutes PRN For ANY of the following changes in patient status:  A. RR LESS THAN 10 breaths per minute, B. Oxygen saturation LESS THAN 90%, C. Sedation score of 6  ondansetron Injectable 4 milliGRAM(s) IV Push every 6 hours PRN Nausea  sodium chloride 0.65% Nasal 1 Spray(s) Both Nostrils every 6 hours PRN Nasal Congestion      Allergies    No Known Allergies    Intolerances      Review of Systems:  Constitutional: No fever  Eyes: No blurry vision  Neuro: No tremors  HEENT: No pain  Cardiovascular: No chest pain, palpitations  Respiratory: No SOB, no cough  GI: No nausea, vomiting, abdominal pain  : No dysuria  Skin: no rash  Psych: no depression  Endocrine: no polyuria, polydipsia  Hem/lymph: no swelling  Osteoporosis: no fractures    ALL OTHER SYSTEMS REVIEWED AND NEGATIVE    UNABLE TO OBTAIN    PHYSICAL EXAM:  VITALS: T(C): 37.6 (18 @ 06:42)  T(F): 99.6 (18 @ 06:42), Max: 99.6 (18 @ 06:42)  HR: 95 (18 @ 06:42) (77 - 95)  BP: 151/81 (18 @ 06:42) (123/78 - 151/81)  RR:  (16 - 18)  SpO2:  (91% - 100%)  Wt(kg): --  GENERAL: NAD, well-groomed, well-developed  EYES: No proptosis, no lid lag, anicteric  HEENT:  Atraumatic, Normocephalic, moist mucous membranes  THYROID: Normal size, no palpable nodules  RESPIRATORY: Clear to auscultation bilaterally; No rales, rhonchi, wheezing, or rubs  CARDIOVASCULAR: Regular rate and rhythm; No murmurs; no peripheral edema  GI: Soft, nontender, non distended, normal bowel sounds  SKIN: Dry, intact, No rashes or lesions  MUSCULOSKELETAL: Full range of motion, normal strength  NEURO: sensation intact, extraocular movements intact, no tremor, normal reflexes  PSYCH: Alert and oriented x 3, normal affect, normal mood  CUSHING'S SIGNS: no striae    POCT Blood Glucose.: 111 mg/dL (18 @ 06:10)  POCT Blood Glucose.: 131 mg/dL (18 @ 03:00)  POCT Blood Glucose.: 104 mg/dL (18 @ 23:44)  POCT Blood Glucose.: 81 mg/dL (18 @ 22:24)  POCT Blood Glucose.: 69 mg/dL (18 @ 21:19)  POCT Blood Glucose.: 75 mg/dL (18 @ 19:53)  POCT Blood Glucose.: 78 mg/dL (18 @ 18:08)  POCT Blood Glucose.: 127 mg/dL (18 @ 12:10)  POCT Blood Glucose.: 121 mg/dL (18 @ 04:44)  POCT Blood Glucose.: 88 mg/dL (18 @ 23:36)  POCT Blood Glucose.: 89 mg/dL (18 @ 23:35)  POCT Blood Glucose.: 102 mg/dL (18 @ 17:55)  POCT Blood Glucose.: 115 mg/dL (18 @ 11:15)  POCT Blood Glucose.: 108 mg/dL (18 @ 06:18)  POCT Blood Glucose.: 136 mg/dL (18 @ 23:09)  POCT Blood Glucose.: 180 mg/dL (18 @ 17:11)  POCT Blood Glucose.: 188 mg/dL (18 @ 12:36)                            9.8    6.97  )-----------( 259      ( 19 Dec 2018 06:11 )             32.3           143  |  100  |  19  ----------------------------<  115<H>  4.1   |  29  |  1.00    EGFR if : 70  EGFR if non : 61    Ca    8.5        Mg     2.8       Phos  2.7         TPro  6.9  /  Alb  3.6  /  TBili  0.2  /  DBili  x   /  AST  22  /  ALT  13  /  AlkPhos  61        Thyroid Function Tests:      Hemoglobin A1C, Whole Blood: 7.1 % <H> [4.0 - 5.6] (18 @ 03:50)  Hemoglobin A1C, Whole Blood: 7.3 % <H> [4.0 - 5.6] (18 @ 04:17)          Radiology: HPI:    63 F with PMH of COPD (on home O2 (3L), moderate pulmonary HTN, hypothyroidism T2DM (HbA1c 7.3 2018, on insulin), RA on prednisone (10 mg daily x 30 years), HTN, s/p CVA (no residual deficit), s/p  x2 presents with abdominal pain and multiple episodes of NBNB vomiting x 1 day. She was in her usual state of health until she developed sharp, intermittent epigastric cramping pain. She had a normal bowel movement this morning and has not passed flatus on day of admission. No recent travel or sick contacts. Patient was admitted on 12/15/2018. CT scan shows SBO with transition point in the lower abdomen with associated interloop fluid and mesenteric edema. She was admitted to the SICU and is s/p ex lap with lysis of adhesion on 2018 and has remained NPO since admission with brief periods of normal saline IV fluids. On admission, FSG in 120s. She was given Lantus 14 units on , due to FSG dropping to 80s on  and 60 in . Her dose was subsequently dropped to Lantus 7 units on . During perioperative period she was on stress dose Iv hydrocrotisone, was tapered and resumed on PO meds on . She was given IV synthroid 96 mcg x 1 during perioperative periods, transitioned to PO tablet  on . Currently NPO    Endocrine hx  Patient had history of chronic prednisone use for RA with baseline dosing of 10 mg qd and uptitrated for flares. Prior to 2018, she had been on only glimepiride 1mg daily for the borderline DM, no other insulin or PO meds at that time. She was seen in 2018 by endocrine for steroid induce hyperglycemia, at which time HbA1c was in 10s. She was recommended for Tresiba 18 units qhs and sitagliptin 50 mg PO due to ease fo administration given RA affecting hands. Also at the time of evaluation in May, patient was noted to have elevated TSH to 30s, at which time Synthroid dosing was increased from 100 mcg to 125 mcg daily.     PAST MEDICAL & SURGICAL HISTORY:  Diverticulosis  CVA (Cerebral Vascular Accident)  RA (Rheumatoid Arthritis)  Tooth Abscess  Arthritis  Cigarette Smoker  Chronic Asthma  Adult Hypothyroidism  Borderline Diabetes Mellitus  High Cholesterol  H/O: HTN  Wrist Disorder: S/P Surgery  History of  Section      FAMILY HISTORY:  No pertinent family history in first degree relatives      Social History:  Former 15 pack year smoker, quit 2 years ago; no etoh or drug abuse; lives with aide, present 7 days weekly      Outpatient Medications:    MEDICATIONS  (STANDING):  acetaminophen  IVPB .. 1000 milliGRAM(s) IV Intermittent once  acetaminophen  IVPB .. 1000 milliGRAM(s) IV Intermittent once  amLODIPine   Tablet 5 milliGRAM(s) Oral daily  buDESOnide 160 MICROgram(s)/formoterol 4.5 MICROgram(s) Inhaler 2 Puff(s) Inhalation two times a day  chlorhexidine 4% Liquid 1 Application(s) Topical <User Schedule>  clopidogrel Tablet 75 milliGRAM(s) Oral daily  dextrose 50% Injectable 25 milliLiter(s) IV Push once  docusate sodium 100 milliGRAM(s) Oral three times a day  fluticasone propionate 50 MICROgram(s)/spray Nasal Spray 1 Spray(s) Both Nostrils two times a day  furosemide    Tablet 40 milliGRAM(s) Oral daily  gabapentin 300 milliGRAM(s) Oral two times a day  heparin  Injectable 5000 Unit(s) SubCutaneous every 8 hours  HYDROmorphone PCA (1 mG/mL) 30 milliLiter(s) PCA Continuous PCA Continuous  influenza   Vaccine 0.5 milliLiter(s) IntraMuscular once  insulin glargine Injectable (LANTUS) 9 Unit(s) SubCutaneous at bedtime  insulin lispro (HumaLOG) corrective regimen sliding scale   SubCutaneous every 6 hours  levothyroxine 125 MICROGram(s) Oral daily  metoprolol tartrate 25 milliGRAM(s) Oral two times a day  oxybutynin 5 milliGRAM(s) Oral two times a day  pantoprazole    Tablet 40 milliGRAM(s) Oral before breakfast  polyethylene glycol 3350 17 Gram(s) Oral daily  predniSONE   Tablet 10 milliGRAM(s) Oral two times a day  senna 2 Tablet(s) Oral at bedtime  simvastatin 20 milliGRAM(s) Oral at bedtime  sodium chloride 0.9%. 1000 milliLiter(s) (10 mL/Hr) IV Continuous <Continuous>  tiotropium 18 MICROgram(s) Capsule 1 Capsule(s) Inhalation daily  traMADol 75 milliGRAM(s) Oral three times a day    MEDICATIONS  (PRN):  ALBUTerol    90 MICROgram(s) HFA Inhaler 1 Puff(s) Inhalation every 4 hours PRN Shortness of Breath and/or Wheezing  HYDROmorphone PCA (1 mG/mL) Rescue Clinician Bolus 0.5 milliGRAM(s) IV Push every 15 minutes PRN for Pain Scale GREATER THAN 6  naloxone Injectable 0.1 milliGRAM(s) IV Push every 3 minutes PRN For ANY of the following changes in patient status:  A. RR LESS THAN 10 breaths per minute, B. Oxygen saturation LESS THAN 90%, C. Sedation score of 6  ondansetron Injectable 4 milliGRAM(s) IV Push every 6 hours PRN Nausea  sodium chloride 0.65% Nasal 1 Spray(s) Both Nostrils every 6 hours PRN Nasal Congestion      Allergies    No Known Allergies    Intolerances      Review of Systems:  Constitutional: No fever  Eyes: No blurry vision  Neuro: No tremors  HEENT: No pain  Cardiovascular: No chest pain, palpitations  Respiratory: No SOB, no cough  GI: No nausea, vomiting, abdominal pain  : No dysuria  Skin: no rash  Psych: no depression  Endocrine: no polyuria, polydipsia  Hem/lymph: no swelling  Osteoporosis: no fractures    ALL OTHER SYSTEMS REVIEWED AND NEGATIVE    UNABLE TO OBTAIN    PHYSICAL EXAM:  VITALS: T(C): 37.6 (18 @ 06:42)  T(F): 99.6 (18 @ 06:42), Max: 99.6 (18 @ 06:42)  HR: 95 (18 @ 06:42) (77 - 95)  BP: 151/81 (18 @ 06:42) (123/78 - 151/81)  RR:  (16 - 18)  SpO2:  (91% - 100%)  Wt(kg): --  GENERAL: NAD, well-groomed, well-developed  EYES: No proptosis, no lid lag, anicteric  HEENT:  Atraumatic, Normocephalic, moist mucous membranes  THYROID: Normal size, no palpable nodules  RESPIRATORY: Clear to auscultation bilaterally; No rales, rhonchi, wheezing, or rubs  CARDIOVASCULAR: Regular rate and rhythm; No murmurs; no peripheral edema  GI: Soft, nontender, non distended, normal bowel sounds  SKIN: Dry, intact, No rashes or lesions  MUSCULOSKELETAL: Full range of motion, normal strength  NEURO: sensation intact, extraocular movements intact, no tremor, normal reflexes  PSYCH: Alert and oriented x 3, normal affect, normal mood  CUSHING'S SIGNS: no striae    POCT Blood Glucose.: 111 mg/dL (18 @ 06:10)  POCT Blood Glucose.: 131 mg/dL (18 @ 03:00)  POCT Blood Glucose.: 104 mg/dL (18 @ 23:44)  POCT Blood Glucose.: 81 mg/dL (18 @ 22:24)  POCT Blood Glucose.: 69 mg/dL (18 @ 21:19)  POCT Blood Glucose.: 75 mg/dL (18 @ 19:53)  POCT Blood Glucose.: 78 mg/dL (18 @ 18:08)  POCT Blood Glucose.: 127 mg/dL (18 @ 12:10)  POCT Blood Glucose.: 121 mg/dL (18 @ 04:44)  POCT Blood Glucose.: 88 mg/dL (18 @ 23:36)  POCT Blood Glucose.: 89 mg/dL (18 @ 23:35)  POCT Blood Glucose.: 102 mg/dL (18 @ 17:55)  POCT Blood Glucose.: 115 mg/dL (18 @ 11:15)  POCT Blood Glucose.: 108 mg/dL (18 @ 06:18)  POCT Blood Glucose.: 136 mg/dL (18 @ 23:09)  POCT Blood Glucose.: 180 mg/dL (18 @ 17:11)  POCT Blood Glucose.: 188 mg/dL (18 @ 12:36)                            9.8    6.97  )-----------( 259      ( 19 Dec 2018 06:11 )             32.3           143  |  100  |  19  ----------------------------<  115<H>  4.1   |  29  |  1.00    EGFR if : 70  EGFR if non : 61    Ca    8.5      -  Mg     2.8       Phos  2.7         TPro  6.9  /  Alb  3.6  /  TBili  0.2  /  DBili  x   /  AST  22  /  ALT  13  /  AlkPhos  61        Thyroid Function Tests:      Hemoglobin A1C, Whole Blood: 7.1 % <H> [4.0 - 5.6] (18 @ 03:50)  Hemoglobin A1C, Whole Blood: 7.3 % <H> [4.0 - 5.6] (18 @ 04:17)          Radiology: HPI:    63 F with PMH of COPD (on home O2 (3L), moderate pulmonary HTN, hypothyroidism T2DM (HbA1c 7.3 2018, on insulin), RA on prednisone (10 mg daily x 30 years), HTN, s/p CVA (no residual deficit), s/p  x2 presents with abdominal pain and multiple episodes of NBNB vomiting x 1 day. She was in her usual state of health until she developed sharp, intermittent epigastric cramping pain. She had a normal bowel movement this morning and has not passed flatus on day of admission. No recent travel or sick contacts. Patient was admitted on 12/15/2018. CT scan shows SBO with transition point in the lower abdomen with associated interloop fluid and mesenteric edema. She was admitted to the SICU and is s/p ex lap with lysis of adhesion on 2018 and has remained NPO since admission with brief periods of normal saline IV fluids. On admission, FSG in 120s. She was given Lantus 14 units on , due to FSG dropping to 80s on  and 60 in . Her dose was subsequently dropped to Lantus 7 units on . During perioperative period she was on stress dose Iv hydrocrotisone, was tapered and resumed on PO meds on . She was given IV synthroid 96 mcg x 1 during perioperative periods, transitioned to PO tablet  on . Currently NPO.    Endocrine hx  Patient had history of chronic prednisone use for RA with baseline dosing of 10 mg qd and uptitrated for flares. Prior to 2018, she had been on only glimepiride 1mg daily for the borderline DM, no other insulin or PO meds at that time. She was seen in 2018 by endocrine for steroid induce hyperglycemia, at which time HbA1c was in 10s. She was recommended for Tresiba 18 units qhs and sitagliptin 50 mg PO due to ease fo administration given RA affecting hands. Also at the time of evaluation in May, patient was noted to have elevated TSH to 30s, at which time Synthroid dosing was increased from 100 mcg to 125 mcg daily.     PAST MEDICAL & SURGICAL HISTORY:  Diverticulosis  CVA (Cerebral Vascular Accident)  RA (Rheumatoid Arthritis)  Tooth Abscess  Arthritis  Cigarette Smoker  Chronic Asthma  Adult Hypothyroidism  Borderline Diabetes Mellitus  High Cholesterol  H/O: HTN  Wrist Disorder: S/P Surgery  History of  Section      FAMILY HISTORY:  No pertinent family history in first degree relatives      Social History:  Former 15 pack year smoker, quit 2 years ago; no etoh or drug abuse; lives with aide, present 7 days weekly      Outpatient Medications:    MEDICATIONS  (STANDING):  acetaminophen  IVPB .. 1000 milliGRAM(s) IV Intermittent once  acetaminophen  IVPB .. 1000 milliGRAM(s) IV Intermittent once  amLODIPine   Tablet 5 milliGRAM(s) Oral daily  buDESOnide 160 MICROgram(s)/formoterol 4.5 MICROgram(s) Inhaler 2 Puff(s) Inhalation two times a day  chlorhexidine 4% Liquid 1 Application(s) Topical <User Schedule>  clopidogrel Tablet 75 milliGRAM(s) Oral daily  dextrose 50% Injectable 25 milliLiter(s) IV Push once  docusate sodium 100 milliGRAM(s) Oral three times a day  fluticasone propionate 50 MICROgram(s)/spray Nasal Spray 1 Spray(s) Both Nostrils two times a day  furosemide    Tablet 40 milliGRAM(s) Oral daily  gabapentin 300 milliGRAM(s) Oral two times a day  heparin  Injectable 5000 Unit(s) SubCutaneous every 8 hours  HYDROmorphone PCA (1 mG/mL) 30 milliLiter(s) PCA Continuous PCA Continuous  influenza   Vaccine 0.5 milliLiter(s) IntraMuscular once  insulin glargine Injectable (LANTUS) 9 Unit(s) SubCutaneous at bedtime  insulin lispro (HumaLOG) corrective regimen sliding scale   SubCutaneous every 6 hours  levothyroxine 125 MICROGram(s) Oral daily  metoprolol tartrate 25 milliGRAM(s) Oral two times a day  oxybutynin 5 milliGRAM(s) Oral two times a day  pantoprazole    Tablet 40 milliGRAM(s) Oral before breakfast  polyethylene glycol 3350 17 Gram(s) Oral daily  predniSONE   Tablet 10 milliGRAM(s) Oral two times a day  senna 2 Tablet(s) Oral at bedtime  simvastatin 20 milliGRAM(s) Oral at bedtime  sodium chloride 0.9%. 1000 milliLiter(s) (10 mL/Hr) IV Continuous <Continuous>  tiotropium 18 MICROgram(s) Capsule 1 Capsule(s) Inhalation daily  traMADol 75 milliGRAM(s) Oral three times a day    MEDICATIONS  (PRN):  ALBUTerol    90 MICROgram(s) HFA Inhaler 1 Puff(s) Inhalation every 4 hours PRN Shortness of Breath and/or Wheezing  HYDROmorphone PCA (1 mG/mL) Rescue Clinician Bolus 0.5 milliGRAM(s) IV Push every 15 minutes PRN for Pain Scale GREATER THAN 6  naloxone Injectable 0.1 milliGRAM(s) IV Push every 3 minutes PRN For ANY of the following changes in patient status:  A. RR LESS THAN 10 breaths per minute, B. Oxygen saturation LESS THAN 90%, C. Sedation score of 6  ondansetron Injectable 4 milliGRAM(s) IV Push every 6 hours PRN Nausea  sodium chloride 0.65% Nasal 1 Spray(s) Both Nostrils every 6 hours PRN Nasal Congestion      Allergies    No Known Allergies    Intolerances      Review of Systems:  Constitutional: No fever  Eyes: No blurry vision  Neuro: No tremors  HEENT: No pain  Cardiovascular: No chest pain, palpitations  Respiratory: No SOB, no cough  GI: No nausea, vomiting, abdominal pain  : No dysuria  Skin: no rash  Psych: no depression  Endocrine: no polyuria, polydipsia  Hem/lymph: no swelling  Osteoporosis: no fractures    ALL OTHER SYSTEMS REVIEWED AND NEGATIVE    UNABLE TO OBTAIN    PHYSICAL EXAM:  VITALS: T(C): 37.6 (18 @ 06:42)  T(F): 99.6 (18 @ 06:42), Max: 99.6 (18 @ 06:42)  HR: 95 (18 @ 06:42) (77 - 95)  BP: 151/81 (18 @ 06:42) (123/78 - 151/81)  RR:  (16 - 18)  SpO2:  (91% - 100%)  Wt(kg): --  GENERAL: NAD, well-groomed, well-developed  EYES: No proptosis, no lid lag, anicteric  HEENT:  Atraumatic, Normocephalic, moist mucous membranes  THYROID: Normal size, no palpable nodules  RESPIRATORY: Clear to auscultation bilaterally; No rales, rhonchi, wheezing, or rubs  CARDIOVASCULAR: Regular rate and rhythm; No murmurs; no peripheral edema  GI: Soft, nontender, non distended, normal bowel sounds  SKIN: Dry, intact, No rashes or lesions  MUSCULOSKELETAL: Full range of motion, normal strength  NEURO: sensation intact, extraocular movements intact, no tremor, normal reflexes  PSYCH: Alert and oriented x 3, normal affect, normal mood  CUSHING'S SIGNS: no striae    POCT Blood Glucose.: 111 mg/dL (18 @ 06:10)  POCT Blood Glucose.: 131 mg/dL (18 @ 03:00)  POCT Blood Glucose.: 104 mg/dL (18 @ 23:44)  POCT Blood Glucose.: 81 mg/dL (18 @ 22:24)  POCT Blood Glucose.: 69 mg/dL (18 @ 21:19)  POCT Blood Glucose.: 75 mg/dL (18 @ 19:53)  POCT Blood Glucose.: 78 mg/dL (18 @ 18:08)  POCT Blood Glucose.: 127 mg/dL (18 @ 12:10)  POCT Blood Glucose.: 121 mg/dL (18 @ 04:44)  POCT Blood Glucose.: 88 mg/dL (18 @ 23:36)  POCT Blood Glucose.: 89 mg/dL (18 @ 23:35)  POCT Blood Glucose.: 102 mg/dL (18 @ 17:55)  POCT Blood Glucose.: 115 mg/dL (18 @ 11:15)  POCT Blood Glucose.: 108 mg/dL (18 @ 06:18)  POCT Blood Glucose.: 136 mg/dL (18 @ 23:09)  POCT Blood Glucose.: 180 mg/dL (18 @ 17:11)  POCT Blood Glucose.: 188 mg/dL (18 @ 12:36)                            9.8    6.97  )-----------( 259      ( 19 Dec 2018 06:11 )             32.3           143  |  100  |  19  ----------------------------<  115<H>  4.1   |  29  |  1.00    EGFR if : 70  EGFR if non : 61    Ca    8.5      -  Mg     2.8       Phos  2.7     12-19    TPro  6.9  /  Alb  3.6  /  TBili  0.2  /  DBili  x   /  AST  22  /  ALT  13  /  AlkPhos  61        Thyroid Function Tests:      Hemoglobin A1C, Whole Blood: 7.1 % <H> [4.0 - 5.6] (18 @ 03:50)  Hemoglobin A1C, Whole Blood: 7.3 % <H> [4.0 - 5.6] (18 @ 04:17)          Radiology: HPI:    63 F with PMH of COPD (on home O2 (3L), moderate pulmonary HTN, hypothyroidism T2DM (HbA1c 7.3 2018, on insulin), RA on prednisone (10 mg daily x 30 years), HTN, s/p CVA (no residual deficit), s/p  x2 presents with abdominal pain and multiple episodes of NBNB vomiting x 1 day. She was in her usual state of health until she developed sharp, intermittent epigastric cramping pain. She had a normal bowel movement this morning and has not passed flatus on day of admission. No recent travel or sick contacts. Patient was admitted on 12/15/2018. CT scan shows SBO with transition point in the lower abdomen with associated interloop fluid and mesenteric edema. She was admitted to the SICU and is s/p ex lap with lysis of adhesion on 2018 and has remained NPO since admission with brief periods of normal saline IV fluids. On admission, FSG in 120s. She was given Lantus 14 units on , due to FSG dropping to 80s on  and 60 in . Her dose was subsequently dropped to Lantus 7 units on . During perioperative period she was on stress dose Iv hydrocrotisone, was tapered and resumed on PO meds on . She was given IV synthroid 96 mcg x 1 during perioperative periods, transitioned to PO tablet  on . Currently NPO.    Endocrine hx  Patient had history of chronic prednisone use for RA with baseline dosing of 10 mg qd and uptitrated for flares. Prior to 2018, she had been on only glimepiride 1mg daily for the borderline DM, no other insulin or PO meds at that time. She was seen in 2018 by endocrine for steroid induce hyperglycemia, at which time HbA1c was in 10s. She was recommended for Tresiba 18 units qhs and sitagliptin 50 mg PO due to ease fo administration given RA affecting hands. Also at the time of evaluation in May, patient was noted to have elevated TSH to 30s, at which time Synthroid dosing was increased from 100 mcg to 125 mcg daily. After discharge, patient was followed at endocrine clinic as well as PMD. Endorses she has been taking     PAST MEDICAL & SURGICAL HISTORY:  Diverticulosis  CVA (Cerebral Vascular Accident)  RA (Rheumatoid Arthritis)  Tooth Abscess  Arthritis  Cigarette Smoker  Chronic Asthma  Adult Hypothyroidism  Borderline Diabetes Mellitus  High Cholesterol  H/O: HTN  Wrist Disorder: S/P Surgery  History of  Section      FAMILY HISTORY:  No pertinent family history in first degree relatives      Social History:  Former 15 pack year smoker, quit 2 years ago; no etoh or drug abuse; lives with aide, present 7 days weekly      Outpatient Medications:    MEDICATIONS  (STANDING):  acetaminophen  IVPB .. 1000 milliGRAM(s) IV Intermittent once  acetaminophen  IVPB .. 1000 milliGRAM(s) IV Intermittent once  amLODIPine   Tablet 5 milliGRAM(s) Oral daily  buDESOnide 160 MICROgram(s)/formoterol 4.5 MICROgram(s) Inhaler 2 Puff(s) Inhalation two times a day  chlorhexidine 4% Liquid 1 Application(s) Topical <User Schedule>  clopidogrel Tablet 75 milliGRAM(s) Oral daily  dextrose 50% Injectable 25 milliLiter(s) IV Push once  docusate sodium 100 milliGRAM(s) Oral three times a day  fluticasone propionate 50 MICROgram(s)/spray Nasal Spray 1 Spray(s) Both Nostrils two times a day  furosemide    Tablet 40 milliGRAM(s) Oral daily  gabapentin 300 milliGRAM(s) Oral two times a day  heparin  Injectable 5000 Unit(s) SubCutaneous every 8 hours  HYDROmorphone PCA (1 mG/mL) 30 milliLiter(s) PCA Continuous PCA Continuous  influenza   Vaccine 0.5 milliLiter(s) IntraMuscular once  insulin glargine Injectable (LANTUS) 9 Unit(s) SubCutaneous at bedtime  insulin lispro (HumaLOG) corrective regimen sliding scale   SubCutaneous every 6 hours  levothyroxine 125 MICROGram(s) Oral daily  metoprolol tartrate 25 milliGRAM(s) Oral two times a day  oxybutynin 5 milliGRAM(s) Oral two times a day  pantoprazole    Tablet 40 milliGRAM(s) Oral before breakfast  polyethylene glycol 3350 17 Gram(s) Oral daily  predniSONE   Tablet 10 milliGRAM(s) Oral two times a day  senna 2 Tablet(s) Oral at bedtime  simvastatin 20 milliGRAM(s) Oral at bedtime  sodium chloride 0.9%. 1000 milliLiter(s) (10 mL/Hr) IV Continuous <Continuous>  tiotropium 18 MICROgram(s) Capsule 1 Capsule(s) Inhalation daily  traMADol 75 milliGRAM(s) Oral three times a day    MEDICATIONS  (PRN):  ALBUTerol    90 MICROgram(s) HFA Inhaler 1 Puff(s) Inhalation every 4 hours PRN Shortness of Breath and/or Wheezing  HYDROmorphone PCA (1 mG/mL) Rescue Clinician Bolus 0.5 milliGRAM(s) IV Push every 15 minutes PRN for Pain Scale GREATER THAN 6  naloxone Injectable 0.1 milliGRAM(s) IV Push every 3 minutes PRN For ANY of the following changes in patient status:  A. RR LESS THAN 10 breaths per minute, B. Oxygen saturation LESS THAN 90%, C. Sedation score of 6  ondansetron Injectable 4 milliGRAM(s) IV Push every 6 hours PRN Nausea  sodium chloride 0.65% Nasal 1 Spray(s) Both Nostrils every 6 hours PRN Nasal Congestion      Allergies    No Known Allergies    Intolerances      Review of Systems:  Constitutional: No fever  Eyes: No blurry vision  Neuro: No tremors  HEENT: No pain  Cardiovascular: No chest pain, palpitations  Respiratory: No SOB, no cough  GI: No nausea, vomiting, abdominal pain  : No dysuria  Skin: no rash  Psych: no depression  Endocrine: no polyuria, polydipsia  Hem/lymph: no swelling  Osteoporosis: no fractures    ALL OTHER SYSTEMS REVIEWED AND NEGATIVE    UNABLE TO OBTAIN    PHYSICAL EXAM:  VITALS: T(C): 37.6 (18 @ 06:42)  T(F): 99.6 (18 @ 06:42), Max: 99.6 (-18 @ 06:42)  HR: 95 (18 @ 06:42) (77 - 95)  BP: 151/81 (18 @ 06:42) (123/78 - 151/81)  RR:  (16 - 18)  SpO2:  (91% - 100%)  Wt(kg): --  GENERAL: NAD, well-groomed, well-developed  EYES: No proptosis, no lid lag, anicteric  HEENT:  Atraumatic, Normocephalic, moist mucous membranes  THYROID: Normal size, no palpable nodules  RESPIRATORY: Clear to auscultation bilaterally; No rales, rhonchi, wheezing, or rubs  CARDIOVASCULAR: Regular rate and rhythm; No murmurs; no peripheral edema  GI: Soft, nontender, non distended, normal bowel sounds  SKIN: Dry, intact, No rashes or lesions  MUSCULOSKELETAL: Full range of motion, normal strength  NEURO: sensation intact, extraocular movements intact, no tremor, normal reflexes  PSYCH: Alert and oriented x 3, normal affect, normal mood  CUSHING'S SIGNS: no striae    POCT Blood Glucose.: 111 mg/dL (18 @ 06:10)  POCT Blood Glucose.: 131 mg/dL (18 @ 03:00)  POCT Blood Glucose.: 104 mg/dL (18 @ 23:44)  POCT Blood Glucose.: 81 mg/dL (18 @ 22:24)  POCT Blood Glucose.: 69 mg/dL (18 @ 21:19)  POCT Blood Glucose.: 75 mg/dL (18 @ 19:53)  POCT Blood Glucose.: 78 mg/dL (18 @ 18:08)  POCT Blood Glucose.: 127 mg/dL (18 @ 12:10)  POCT Blood Glucose.: 121 mg/dL (18 @ 04:44)  POCT Blood Glucose.: 88 mg/dL (18 @ 23:36)  POCT Blood Glucose.: 89 mg/dL (18 @ 23:35)  POCT Blood Glucose.: 102 mg/dL (18 @ 17:55)  POCT Blood Glucose.: 115 mg/dL (18 @ 11:15)  POCT Blood Glucose.: 108 mg/dL (18 @ 06:18)  POCT Blood Glucose.: 136 mg/dL (18 @ 23:09)  POCT Blood Glucose.: 180 mg/dL (18 @ 17:11)  POCT Blood Glucose.: 188 mg/dL (18 @ 12:36)                            9.8    6.97  )-----------( 259      ( 19 Dec 2018 06:11 )             32.3           143  |  100  |  19  ----------------------------<  115<H>  4.1   |  29  |  1.00    EGFR if : 70  EGFR if non : 61    Ca    8.5        Mg     2.8       Phos  2.7         TPro  6.9  /  Alb  3.6  /  TBili  0.2  /  DBili  x   /  AST  22  /  ALT  13  /  AlkPhos  61        Thyroid Function Tests:      Hemoglobin A1C, Whole Blood: 7.1 % <H> [4.0 - 5.6] (18 @ 03:50)  Hemoglobin A1C, Whole Blood: 7.3 % <H> [4.0 - 5.6] (18 @ 04:17)          Radiology: HPI:    63 F with PMH of COPD (on home O2 (3L), moderate pulmonary HTN, hypothyroidism T2DM (HbA1c 8s 2018, on insulin), RA on prednisone (10 mg daily x 30 years), HTN, s/p CVA (no residual deficit), s/p  x2 presents with abdominal pain and multiple episodes of NBNB vomiting x 1 day. She was in her usual state of health until she developed sharp, intermittent epigastric cramping pain. She had a normal bowel movement this morning and has not passed flatus on day of admission. No recent travel or sick contacts. Patient was admitted on 12/15/2018. CT scan shows SBO with transition point in the lower abdomen with associated interloop fluid and mesenteric edema. She was admitted to the SICU and is s/p ex lap with lysis of adhesion on 2018 and has remained NPO since admission with brief periods of normal saline IV fluids. On admission, FSG in 120s. She was given Lantus 14 units on , due to FSG dropping to 80s on  and 60 in . Her dose was subsequently dropped to Lantus 7 units on . During perioperative period she was on stress dose Iv hydrocrotisone, was tapered and resumed on PO meds on . She was given IV synthroid 96 mcg x 1 during perioperative periods, transitioned to PO tablet  on . Currently NPO on trickle NS @ 10 cc/hr    Endocrine hx  Patient had history of chronic prednisone use for RA with baseline dosing of 10 mg qd and uptitrated for flares. Prior to 2018, she had been on only glimepiride 1mg daily for the borderline DM, no other insulin or PO meds at that time. During her 2018 admission for COPD exacerbation managed with uptitation of steroids, she was seen by endocrine for steroid induce hyperglycemia, at which time HbA1c 10s. She was dsicharged on Tresiba 18 units qhs and sitagliptin 50 mg PO due to ease of administration given RA affecting hands. Also at the time of evaluation in May, patient was noted to have elevated TSH to 30s, at which time Synthroid dosing was increased from 100 mcg to 125 mcg daily, which has been taking daily on empty stomach, last TSH 23 in 10/2018. After discharge, patient was followed at endocrine clinic as well as PMD. Shortly after discharge, she started experiencing early morning and night time hypoglycemia after which tresiba had been discontinue for NPH 18. However, her PMD added Levemir 20 units at bedtime for persistent hyperglycemia.     PAST MEDICAL & SURGICAL HISTORY:  Diverticulosis  CVA (Cerebral Vascular Accident)  RA (Rheumatoid Arthritis)  Tooth Abscess  Arthritis  Cigarette Smoker  Chronic Asthma  Adult Hypothyroidism  Borderline Diabetes Mellitus  High Cholesterol  H/O: HTN  Wrist Disorder: S/P Surgery  History of  Section      FAMILY HISTORY:  No pertinent family history in first degree relatives      Social History:  Former 15 pack year smoker, quit 2 years ago; no etoh or drug abuse; lives with aide, present 7 days weekly      Outpatient Medications:    MEDICATIONS  (STANDING):  acetaminophen  IVPB .. 1000 milliGRAM(s) IV Intermittent once  acetaminophen  IVPB .. 1000 milliGRAM(s) IV Intermittent once  amLODIPine   Tablet 5 milliGRAM(s) Oral daily  buDESOnide 160 MICROgram(s)/formoterol 4.5 MICROgram(s) Inhaler 2 Puff(s) Inhalation two times a day  chlorhexidine 4% Liquid 1 Application(s) Topical <User Schedule>  clopidogrel Tablet 75 milliGRAM(s) Oral daily  dextrose 50% Injectable 25 milliLiter(s) IV Push once  docusate sodium 100 milliGRAM(s) Oral three times a day  fluticasone propionate 50 MICROgram(s)/spray Nasal Spray 1 Spray(s) Both Nostrils two times a day  furosemide    Tablet 40 milliGRAM(s) Oral daily  gabapentin 300 milliGRAM(s) Oral two times a day  heparin  Injectable 5000 Unit(s) SubCutaneous every 8 hours  HYDROmorphone PCA (1 mG/mL) 30 milliLiter(s) PCA Continuous PCA Continuous  influenza   Vaccine 0.5 milliLiter(s) IntraMuscular once  insulin glargine Injectable (LANTUS) 9 Unit(s) SubCutaneous at bedtime  insulin lispro (HumaLOG) corrective regimen sliding scale   SubCutaneous every 6 hours  levothyroxine 125 MICROGram(s) Oral daily  metoprolol tartrate 25 milliGRAM(s) Oral two times a day  oxybutynin 5 milliGRAM(s) Oral two times a day  pantoprazole    Tablet 40 milliGRAM(s) Oral before breakfast  polyethylene glycol 3350 17 Gram(s) Oral daily  predniSONE   Tablet 10 milliGRAM(s) Oral two times a day  senna 2 Tablet(s) Oral at bedtime  simvastatin 20 milliGRAM(s) Oral at bedtime  sodium chloride 0.9%. 1000 milliLiter(s) (10 mL/Hr) IV Continuous <Continuous>  tiotropium 18 MICROgram(s) Capsule 1 Capsule(s) Inhalation daily  traMADol 75 milliGRAM(s) Oral three times a day    MEDICATIONS  (PRN):  ALBUTerol    90 MICROgram(s) HFA Inhaler 1 Puff(s) Inhalation every 4 hours PRN Shortness of Breath and/or Wheezing  HYDROmorphone PCA (1 mG/mL) Rescue Clinician Bolus 0.5 milliGRAM(s) IV Push every 15 minutes PRN for Pain Scale GREATER THAN 6  naloxone Injectable 0.1 milliGRAM(s) IV Push every 3 minutes PRN For ANY of the following changes in patient status:  A. RR LESS THAN 10 breaths per minute, B. Oxygen saturation LESS THAN 90%, C. Sedation score of 6  ondansetron Injectable 4 milliGRAM(s) IV Push every 6 hours PRN Nausea  sodium chloride 0.65% Nasal 1 Spray(s) Both Nostrils every 6 hours PRN Nasal Congestion      Allergies    No Known Allergies    Intolerances      Review of Systems:  Constitutional: No fever  Eyes: No blurry vision  Neuro: No tremors  HEENT: No pain  Cardiovascular: No chest pain, palpitations  Respiratory: No SOB, no cough  GI: No nausea, vomiting, abdominal pain  : No dysuria  Skin: no rash  Psych: no depression  Endocrine: no polyuria, polydipsia  Hem/lymph: no swelling  Osteoporosis: no fractures    ALL OTHER SYSTEMS REVIEWED AND NEGATIVE    UNABLE TO OBTAIN    PHYSICAL EXAM:  VITALS: T(C): 37.6 (18 @ 06:42)  T(F): 99.6 (18 @ 06:42), Max: 99.6 (18 @ 06:42)  HR: 95 (18 @ 06:42) (77 - 95)  BP: 151/81 (18 @ 06:42) (123/78 - 151/81)  RR:  (16 - 18)  SpO2:  (91% - 100%)  Wt(kg): --  GENERAL: NAD, well-groomed, well-developed  EYES: No proptosis, no lid lag, anicteric  HEENT:  Atraumatic, Normocephalic, moist mucous membranes  THYROID: Normal size, no palpable nodules  RESPIRATORY: Clear to auscultation bilaterally; No rales, rhonchi, wheezing, or rubs  CARDIOVASCULAR: Regular rate and rhythm; No murmurs; no peripheral edema  GI: Soft, nontender, non distended, normal bowel sounds  SKIN: Dry, intact, No rashes or lesions  MUSCULOSKELETAL: Full range of motion, normal strength  NEURO: sensation intact, extraocular movements intact, no tremor, normal reflexes  PSYCH: Alert and oriented x 3, normal affect, normal mood  CUSHING'S SIGNS: no striae    POCT Blood Glucose.: 111 mg/dL (18 @ 06:10)  POCT Blood Glucose.: 131 mg/dL (18 @ 03:00)  POCT Blood Glucose.: 104 mg/dL (18 @ 23:44)  POCT Blood Glucose.: 81 mg/dL (18 @ 22:24)  POCT Blood Glucose.: 69 mg/dL (18 @ 21:19)  POCT Blood Glucose.: 75 mg/dL (18 @ 19:53)  POCT Blood Glucose.: 78 mg/dL (18 @ 18:08)  POCT Blood Glucose.: 127 mg/dL (18 @ 12:10)  POCT Blood Glucose.: 121 mg/dL (18 @ 04:44)  POCT Blood Glucose.: 88 mg/dL (18 @ 23:36)  POCT Blood Glucose.: 89 mg/dL (18 @ 23:35)  POCT Blood Glucose.: 102 mg/dL (18 @ 17:55)  POCT Blood Glucose.: 115 mg/dL (18 @ 11:15)  POCT Blood Glucose.: 108 mg/dL (18 @ 06:18)  POCT Blood Glucose.: 136 mg/dL (18 @ 23:09)  POCT Blood Glucose.: 180 mg/dL (18 @ 17:11)  POCT Blood Glucose.: 188 mg/dL (18 @ 12:36)                            9.8    6.97  )-----------( 259      ( 19 Dec 2018 06:11 )             32.3           143  |  100  |  19  ----------------------------<  115<H>  4.1   |  29  |  1.00    EGFR if : 70  EGFR if non : 61    Ca    8.5        Mg     2.8       Phos  2.7         TPro  6.9  /  Alb  3.6  /  TBili  0.2  /  DBili  x   /  AST  22  /  ALT  13  /  AlkPhos  61        Thyroid Function Tests:      Hemoglobin A1C, Whole Blood: 7.1 % <H> [4.0 - 5.6] (18 @ 03:50)  Hemoglobin A1C, Whole Blood: 7.3 % <H> [4.0 - 5.6] (18 @ 04:17)          Radiology: HPI:    63 F with PMH of COPD (on home O2 (3L), hypothyroidism T2DM (HbA1c 8s 2018, on insulin), RA on prednisone (10 mg daily x 30 years), HTN, s/p  x2 presents with abdominal pain and multiple episodes of NBNB vomiting x 1 day. She was in her usual state of health until she developed sharp, intermittent epigastric cramping pain. She had a normal bowel movement this morning and has not passed flatus on day of admission. Patient was admitted on 12/15/2018. CT scan shows SBO for which she was admitted to the SICU and is s/p ex lap with lysis of adhesion on 2018 and has remained NPO since admission with brief periods of normal saline IV fluids. On admission, FSG in 120s. She was given Lantus 14 units on , due to FSG dropping to 80s on  and to 60s on . Her dose was subsequently dropped to Lantus 7 units on . During perioperative period she was on stress dose Iv hydrocrotisone, was tapered and resumed on PO meds on . She was given IV synthroid 96 mcg x 1 during perioperative periods, transitioned to PO tablet  on . Currently NPO with sips/chips and PO meds on trickle NS @ 10 cc/hr    Endocrine hx  Patient has history of chronic prednisone intermittenly uptitrated for RA flare and/or COPD exacerbation. Prior to 2018, she had been on only glimepiride 1mg daily. During her 2018 admission for COPD exacerbation managed with uptitation of steroids, she was seen by endocrine for steroid induce hyperglycemia, at which time HbA1c 10s. She was discharged on Tresiba 18 units qhs and sitagliptin 50 mg PO due to ease of administration given RA affecting hands. Also at the time of evaluation in May, patient was noted to have elevated TSH to 30s, at which time Synthroid dosing was increased from 100 mcg to 125 mcg daily, which has been taking daily on empty stomach, last TSH 23 in 10/2018. After discharge, patient was followed at endocrine clinic in 2018 as well as PMD. She early morning early morning and late night time hypoglycemia after which tresiba had been disreplaced for NPH 18. However, her PMD added Levemir 20 units at bedtime for persistent hyperglycemia. At endo visit in 2018, regimen changed to Tresiba 30 units AM, Januvia 50, and Prandin 1 mg. Patient endorses that she still has periods where she wakes up with sugars of 60s. Post prandial FSG range from 140-180s. She has been taking insulin every other day at times due to the hyperglycemia.         PAST MEDICAL & SURGICAL HISTORY:  Diverticulosis  CVA (Cerebral Vascular Accident)  RA (Rheumatoid Arthritis)  Tooth Abscess  Arthritis  Cigarette Smoker  Chronic Asthma  Adult Hypothyroidism  Borderline Diabetes Mellitus  High Cholesterol  H/O: HTN  Wrist Disorder: S/P Surgery  History of  Section      FAMILY HISTORY:  Multiple siblings with diabetes      Social History:  Former 15 pack year smoker, quit 2 years ago; no etoh or drug abuse; lives with aide, present 7 days weekly      Outpatient Medications:    MEDICATIONS  (STANDING):  acetaminophen  IVPB .. 1000 milliGRAM(s) IV Intermittent once  acetaminophen  IVPB .. 1000 milliGRAM(s) IV Intermittent once  amLODIPine   Tablet 5 milliGRAM(s) Oral daily  buDESOnide 160 MICROgram(s)/formoterol 4.5 MICROgram(s) Inhaler 2 Puff(s) Inhalation two times a day  chlorhexidine 4% Liquid 1 Application(s) Topical <User Schedule>  clopidogrel Tablet 75 milliGRAM(s) Oral daily  dextrose 50% Injectable 25 milliLiter(s) IV Push once  docusate sodium 100 milliGRAM(s) Oral three times a day  fluticasone propionate 50 MICROgram(s)/spray Nasal Spray 1 Spray(s) Both Nostrils two times a day  furosemide    Tablet 40 milliGRAM(s) Oral daily  gabapentin 300 milliGRAM(s) Oral two times a day  heparin  Injectable 5000 Unit(s) SubCutaneous every 8 hours  HYDROmorphone PCA (1 mG/mL) 30 milliLiter(s) PCA Continuous PCA Continuous  influenza   Vaccine 0.5 milliLiter(s) IntraMuscular once  insulin glargine Injectable (LANTUS) 9 Unit(s) SubCutaneous at bedtime  insulin lispro (HumaLOG) corrective regimen sliding scale   SubCutaneous every 6 hours  levothyroxine 125 MICROGram(s) Oral daily  metoprolol tartrate 25 milliGRAM(s) Oral two times a day  oxybutynin 5 milliGRAM(s) Oral two times a day  pantoprazole    Tablet 40 milliGRAM(s) Oral before breakfast  polyethylene glycol 3350 17 Gram(s) Oral daily  predniSONE   Tablet 10 milliGRAM(s) Oral two times a day  senna 2 Tablet(s) Oral at bedtime  simvastatin 20 milliGRAM(s) Oral at bedtime  sodium chloride 0.9%. 1000 milliLiter(s) (10 mL/Hr) IV Continuous <Continuous>  tiotropium 18 MICROgram(s) Capsule 1 Capsule(s) Inhalation daily  traMADol 75 milliGRAM(s) Oral three times a day    MEDICATIONS  (PRN):  ALBUTerol    90 MICROgram(s) HFA Inhaler 1 Puff(s) Inhalation every 4 hours PRN Shortness of Breath and/or Wheezing  HYDROmorphone PCA (1 mG/mL) Rescue Clinician Bolus 0.5 milliGRAM(s) IV Push every 15 minutes PRN for Pain Scale GREATER THAN 6  naloxone Injectable 0.1 milliGRAM(s) IV Push every 3 minutes PRN For ANY of the following changes in patient status:  A. RR LESS THAN 10 breaths per minute, B. Oxygen saturation LESS THAN 90%, C. Sedation score of 6  ondansetron Injectable 4 milliGRAM(s) IV Push every 6 hours PRN Nausea  sodium chloride 0.65% Nasal 1 Spray(s) Both Nostrils every 6 hours PRN Nasal Congestion      Allergies    No Known Allergies    Intolerances      Review of Systems:  Constitutional: No fever  Eyes: No blurry vision  Neuro: No tremors  HEENT: No pain  Cardiovascular: No chest pain, palpitations  Respiratory: No SOB, no cough  GI: No nausea, vomiting, abdominal pain  : No dysuria  Skin: no rash  Psych: no depression  Endocrine: no polyuria, polydipsia  Hem/lymph: no swelling  Osteoporosis: no fractures    ALL OTHER SYSTEMS REVIEWED AND NEGATIVE    UNABLE TO OBTAIN    PHYSICAL EXAM:  VITALS: T(C): 37.6 (18 @ 06:42)  T(F): 99.6 (18 @ 06:42), Max: 99.6 (18 @ 06:42)  HR: 95 (18 @ 06:42) (77 - 95)  BP: 151/81 (18 @ 06:42) (123/78 - 151/81)  RR:  (16 - 18)  SpO2:  (91% - 100%)  Wt(kg): --      GENERAL: Obese body habitus, resting in bed NAD  EYES: Mild periorbital edema  HEENT:  Atraumatic, Normocephalic, moist mucous membranes  THYROID: Normal size, no palpable nodules  RESPIRATORY: Clear to auscultation bilaterally; No rales, rhonchi, wheezing, or rubs  CARDIOVASCULAR: Regular rate and rhythm; No murmurs; 2+ edema  GI: Soft, midline dressing in place CDI. + normal BS Minimal TTP   SKIN: No rash  MUSCULOSKELETAL: swan neck deformities in hands. right healed radial surgical incision   NEURO: sensation intact, extraocular movements intact, no tremor, normal reflexes  PSYCH: Alert and oriented x 3, normal affect, normal mood  CUSHING'S SIGNS: no striae    POCT Blood Glucose.: 111 mg/dL (18 @ 06:10)  POCT Blood Glucose.: 131 mg/dL (18 @ 03:00)  POCT Blood Glucose.: 104 mg/dL (18 @ 23:44)  POCT Blood Glucose.: 81 mg/dL (18 @ 22:24)  POCT Blood Glucose.: 69 mg/dL (18 @ 21:19)  POCT Blood Glucose.: 75 mg/dL (18 @ 19:53)  POCT Blood Glucose.: 78 mg/dL (18 @ 18:08)  POCT Blood Glucose.: 127 mg/dL (18 @ 12:10)  POCT Blood Glucose.: 121 mg/dL (18 @ 04:44)  POCT Blood Glucose.: 88 mg/dL (18 @ 23:36)  POCT Blood Glucose.: 89 mg/dL (18 @ 23:35)  POCT Blood Glucose.: 102 mg/dL (18 @ 17:55)  POCT Blood Glucose.: 115 mg/dL (18 @ 11:15)  POCT Blood Glucose.: 108 mg/dL (18 @ 06:18)  POCT Blood Glucose.: 136 mg/dL (18 @ 23:09)  POCT Blood Glucose.: 180 mg/dL (18 @ 17:11)  POCT Blood Glucose.: 188 mg/dL (18 @ 12:36)                            9.8    6.97  )-----------( 259      ( 19 Dec 2018 06:11 )             32.3           143  |  100  |  19  ----------------------------<  115<H>  4.1   |  29  |  1.00    EGFR if : 70  EGFR if non : 61    Ca    8.5        Mg     2.8       Phos  2.7         TPro  6.9  /  Alb  3.6  /  TBili  0.2  /  DBili  x   /  AST  22  /  ALT  13  /  AlkPhos  61        Thyroid Function Tests:      Hemoglobin A1C, Whole Blood: 7.1 % <H> [4.0 - 5.6] (18 @ 03:50)  Hemoglobin A1C, Whole Blood: 7.3 % <H> [4.0 - 5.6] (18 @ 04:17)          Radiology:     < from: CT Abdomen and Pelvis w/ IV Cont (12.15.18 @ 19:34) >  IMPRESSION:     Diffusely dilated fluid filled small bowel loops with a transition point   in the lower abdomen (2, 83) consistent with small bowel obstruction.   Thickened jejunal folds with hyperemia at the level of the transition   point. Proximal jejunal folds are also thickened with surrounding   interloop fluid though detailed evaluation is limited secondary to   inadequate distention. Mild mesenteric edema and interloop fluid   surrounding distal jejunoileal foldswith fecalization of the ileal loops   identified. Correlate with lactic acid to assess for vascular insult.   Decompression, surgical consultation and short-term imaging follow-up is   advised.    Additional findings as mentioned above.    < end of copied text > HPI:    63 F with PMH of COPD (on home O2 (3L), hypothyroidism T2DM (HbA1c 8s 2018, on insulin), RA on prednisone (10 mg daily x 30 years), HTN, s/p  x2 presents with abdominal pain and multiple episodes of NBNB vomiting x 1 day. She was in her usual state of health until she developed sharp, intermittent epigastric cramping pain. She had a normal bowel movement this morning and has not passed flatus on day of admission. Patient was admitted on 12/15/2018. CT scan shows SBO for which she was admitted to the SICU and is s/p ex lap with lysis of adhesion on 2018 and has remained NPO since admission with brief periods of normal saline IV fluids. On admission, FSG in 120s. She was given Lantus 14 units on . However, due to FSG dropping to 80s on  and to 60s on , her dose was subsequently dropped to Lantus 7 units on . During perioperative period she was on stress dose Iv hydrocrotisone, was tapered and resumed on PO meds on . She was given IV synthroid 96 mcg x 1 during perioperative periods, transitioned to PO tablet  on . Currently NPO with sips/chips and PO meds on trickle NS @ 10 cc/hr    Endocrine hx  Patient has history of chronic prednisone intermittenly uptitrated for RA flare and/or COPD exacerbation. Prior to 2018, she had been on only glimepiride 1mg daily. During her 2018 admission for COPD exacerbation managed with uptitation of steroids, she was seen by endocrine for steroid induce hyperglycemia, at which time HbA1c 10s. She was discharged on Tresiba 18 units qhs and sitagliptin 50 mg PO due to ease of administration given RA affecting hands. Also at the time of evaluation in May, patient was noted to have elevated TSH to 30s, at which time Synthroid dosing was increased from 100 mcg to 125 mcg daily, which has been taking daily on empty stomach, last TSH 23 in 10/2018. After discharge, patient was followed at endocrine clinic in 2018 as well as PMD. She early morning early morning and late night time hypoglycemia after which tresiba had been disreplaced for NPH 18. However, her PMD added Levemir 20 units at bedtime for persistent hyperglycemia. At endo visit in 2018, regimen changed to Tresiba 30 units AM, Januvia 50, and Prandin 1 mg. Patient endorses that she still has periods where she wakes up with sugars of 60s. Post prandial FSG range from 140-180s. She has been taking insulin every other day at times due to the hyperglycemia.         PAST MEDICAL & SURGICAL HISTORY:  Diverticulosis  CVA (Cerebral Vascular Accident)  RA (Rheumatoid Arthritis)  Tooth Abscess  Arthritis  Cigarette Smoker  Chronic Asthma  Adult Hypothyroidism  Borderline Diabetes Mellitus  High Cholesterol  H/O: HTN  Wrist Disorder: S/P Surgery  History of  Section      FAMILY HISTORY:  Multiple siblings with diabetes      Social History:  Former 15 pack year smoker, quit 2 years ago; no etoh or drug abuse; lives with aide, present 7 days weekly      Outpatient Medications:    MEDICATIONS  (STANDING):  acetaminophen  IVPB .. 1000 milliGRAM(s) IV Intermittent once  acetaminophen  IVPB .. 1000 milliGRAM(s) IV Intermittent once  amLODIPine   Tablet 5 milliGRAM(s) Oral daily  buDESOnide 160 MICROgram(s)/formoterol 4.5 MICROgram(s) Inhaler 2 Puff(s) Inhalation two times a day  chlorhexidine 4% Liquid 1 Application(s) Topical <User Schedule>  clopidogrel Tablet 75 milliGRAM(s) Oral daily  dextrose 50% Injectable 25 milliLiter(s) IV Push once  docusate sodium 100 milliGRAM(s) Oral three times a day  fluticasone propionate 50 MICROgram(s)/spray Nasal Spray 1 Spray(s) Both Nostrils two times a day  furosemide    Tablet 40 milliGRAM(s) Oral daily  gabapentin 300 milliGRAM(s) Oral two times a day  heparin  Injectable 5000 Unit(s) SubCutaneous every 8 hours  HYDROmorphone PCA (1 mG/mL) 30 milliLiter(s) PCA Continuous PCA Continuous  influenza   Vaccine 0.5 milliLiter(s) IntraMuscular once  insulin glargine Injectable (LANTUS) 9 Unit(s) SubCutaneous at bedtime  insulin lispro (HumaLOG) corrective regimen sliding scale   SubCutaneous every 6 hours  levothyroxine 125 MICROGram(s) Oral daily  metoprolol tartrate 25 milliGRAM(s) Oral two times a day  oxybutynin 5 milliGRAM(s) Oral two times a day  pantoprazole    Tablet 40 milliGRAM(s) Oral before breakfast  polyethylene glycol 3350 17 Gram(s) Oral daily  predniSONE   Tablet 10 milliGRAM(s) Oral two times a day  senna 2 Tablet(s) Oral at bedtime  simvastatin 20 milliGRAM(s) Oral at bedtime  sodium chloride 0.9%. 1000 milliLiter(s) (10 mL/Hr) IV Continuous <Continuous>  tiotropium 18 MICROgram(s) Capsule 1 Capsule(s) Inhalation daily  traMADol 75 milliGRAM(s) Oral three times a day    MEDICATIONS  (PRN):  ALBUTerol    90 MICROgram(s) HFA Inhaler 1 Puff(s) Inhalation every 4 hours PRN Shortness of Breath and/or Wheezing  HYDROmorphone PCA (1 mG/mL) Rescue Clinician Bolus 0.5 milliGRAM(s) IV Push every 15 minutes PRN for Pain Scale GREATER THAN 6  naloxone Injectable 0.1 milliGRAM(s) IV Push every 3 minutes PRN For ANY of the following changes in patient status:  A. RR LESS THAN 10 breaths per minute, B. Oxygen saturation LESS THAN 90%, C. Sedation score of 6  ondansetron Injectable 4 milliGRAM(s) IV Push every 6 hours PRN Nausea  sodium chloride 0.65% Nasal 1 Spray(s) Both Nostrils every 6 hours PRN Nasal Congestion      Allergies    No Known Allergies    Intolerances      Review of Systems:  Constitutional: No fever  Eyes: No blurry vision  Neuro: No tremors  HEENT: No pain  Cardiovascular: No chest pain, palpitations  Respiratory: No SOB, no cough  GI: No nausea, vomiting, abdominal pain  : No dysuria  Skin: no rash  Psych: no depression  Endocrine: no polyuria, polydipsia  Hem/lymph: no swelling  Osteoporosis: no fractures    ALL OTHER SYSTEMS REVIEWED AND NEGATIVE    UNABLE TO OBTAIN    PHYSICAL EXAM:  VITALS: T(C): 37.6 (18 @ 06:42)  T(F): 99.6 (18 @ 06:42), Max: 99.6 (18 @ 06:42)  HR: 95 (18 @ 06:42) (77 - 95)  BP: 151/81 (18 @ 06:42) (123/78 - 151/81)  RR:  (16 - 18)  SpO2:  (91% - 100%)  Wt(kg): --      GENERAL: Obese body habitus, resting in bed NAD  EYES: Mild periorbital edema  HEENT:  Atraumatic, Normocephalic, moist mucous membranes  THYROID: Normal size, no palpable nodules  RESPIRATORY: Clear to auscultation bilaterally; No rales, rhonchi, wheezing, or rubs  CARDIOVASCULAR: Regular rate and rhythm; No murmurs; 2+ edema  GI: Soft, midline dressing in place CDI. + normal BS Minimal TTP   SKIN: No rash  MUSCULOSKELETAL: swan neck deformities in hands. right healed radial surgical incision   NEURO: sensation intact, extraocular movements intact, no tremor, normal reflexes  PSYCH: Alert and oriented x 3, normal affect, normal mood  CUSHING'S SIGNS: no striae    POCT Blood Glucose.: 111 mg/dL (18 @ 06:10)  POCT Blood Glucose.: 131 mg/dL (18 @ 03:00)  POCT Blood Glucose.: 104 mg/dL (18 @ 23:44)  POCT Blood Glucose.: 81 mg/dL (18 @ 22:24)  POCT Blood Glucose.: 69 mg/dL (18 @ 21:19)  POCT Blood Glucose.: 75 mg/dL (18 @ 19:53)  POCT Blood Glucose.: 78 mg/dL (18 @ 18:08)  POCT Blood Glucose.: 127 mg/dL (18 @ 12:10)  POCT Blood Glucose.: 121 mg/dL (18 @ 04:44)  POCT Blood Glucose.: 88 mg/dL (18 @ 23:36)  POCT Blood Glucose.: 89 mg/dL (18 @ 23:35)  POCT Blood Glucose.: 102 mg/dL (18 @ 17:55)  POCT Blood Glucose.: 115 mg/dL (18 @ 11:15)  POCT Blood Glucose.: 108 mg/dL (18 @ 06:18)  POCT Blood Glucose.: 136 mg/dL (18 @ 23:09)  POCT Blood Glucose.: 180 mg/dL (18 @ 17:11)  POCT Blood Glucose.: 188 mg/dL (18 @ 12:36)                            9.8    6.97  )-----------( 259      ( 19 Dec 2018 06:11 )             32.3           143  |  100  |  19  ----------------------------<  115<H>  4.1   |  29  |  1.00    EGFR if : 70  EGFR if non : 61    Ca    8.5        Mg     2.8       Phos  2.7         TPro  6.9  /  Alb  3.6  /  TBili  0.2  /  DBili  x   /  AST  22  /  ALT  13  /  AlkPhos  61        Thyroid Function Tests:      Hemoglobin A1C, Whole Blood: 7.1 % <H> [4.0 - 5.6] (18 @ 03:50)  Hemoglobin A1C, Whole Blood: 7.3 % <H> [4.0 - 5.6] (18 @ 04:17)          Radiology:     < from: CT Abdomen and Pelvis w/ IV Cont (12.15.18 @ 19:34) >  IMPRESSION:     Diffusely dilated fluid filled small bowel loops with a transition point   in the lower abdomen (2, 83) consistent with small bowel obstruction.   Thickened jejunal folds with hyperemia at the level of the transition   point. Proximal jejunal folds are also thickened with surrounding   interloop fluid though detailed evaluation is limited secondary to   inadequate distention. Mild mesenteric edema and interloop fluid   surrounding distal jejunoileal foldswith fecalization of the ileal loops   identified. Correlate with lactic acid to assess for vascular insult.   Decompression, surgical consultation and short-term imaging follow-up is   advised.    Additional findings as mentioned above.    < end of copied text > HPI:  63 F with PMH of COPD (on home O2 (3L), hypothyroidism T2DM (HbA1c 8s 2018, on insulin), RA on prednisone (10 mg daily x 30 years), HTN, s/p  x2 presents with abdominal pain and multiple episodes of NBNB vomiting x 1 day. She was in her usual state of health until she developed sharp, intermittent epigastric cramping pain. She had a normal bowel movement this morning and has not passed flatus on day of admission. Patient was admitted on 12/15/2018. CT scan shows SBO for which she was admitted to the SICU and is s/p ex lap with lysis of adhesion on 2018 and has remained NPO since admission with brief periods of normal saline IV fluids. On admission, FSG in 120s. She was given Lantus 14 units on . However, due to FSG dropping to 80s on  and to 60s on , her dose was subsequently dropped to Lantus 7 units on . During perioperative period she was on stress dose Iv hydrocrotisone, was tapered and resumed on PO meds on . She was given IV synthroid 96 mcg x 1 during perioperative periods, transitioned to PO tablet  on . Currently NPO with sips/chips and PO meds on trickle NS @ 10 cc/hr    Endocrine hx  Patient has history of chronic prednisone intermittenly uptitrated for RA flare and/or COPD exacerbation. Prior to 2018, she had been on only glimepiride 1mg daily. During her 2018 admission for COPD exacerbation managed with uptitation of steroids, she was seen by endocrine for steroid induce hyperglycemia, at which time HbA1c 10s. She was discharged on Tresiba 18 units qhs and sitagliptin 50 mg PO due to ease of administration given RA affecting hands. Also at the time of evaluation in May, patient was noted to have elevated TSH to 30s, at which time Synthroid dosing was increased from 100 mcg to 125 mcg daily, which has been taking daily on empty stomach, last TSH 23 in 10/2018. After discharge, patient was followed at endocrine clinic in 2018 as well as PMD. She early morning early morning and late night time hypoglycemia after which tresiba had been disreplaced for NPH 18. However, her PMD added Levemir 20 units at bedtime for persistent hyperglycemia. At endo visit in 2018, regimen changed to Tresiba 30 units AM, Januvia 50, and Prandin 1 mg. Patient endorses that she still has periods where she wakes up with sugars of 60s. Post prandial FSG range from 140-180s. She has been taking insulin every other day at times due to the hyperglycemia.         PAST MEDICAL & SURGICAL HISTORY:  Diverticulosis  CVA (Cerebral Vascular Accident)  RA (Rheumatoid Arthritis)  Tooth Abscess  Arthritis  Cigarette Smoker  Chronic Asthma  Adult Hypothyroidism  Borderline Diabetes Mellitus  High Cholesterol  H/O: HTN  Wrist Disorder: S/P Surgery  History of  Section      FAMILY HISTORY:  Multiple siblings with diabetes      Social History:  Former 15 pack year smoker, quit 2 years ago; no etoh or drug abuse; lives with aide, present 7 days weekly      Outpatient Medications:    MEDICATIONS  (STANDING):  acetaminophen  IVPB .. 1000 milliGRAM(s) IV Intermittent once  acetaminophen  IVPB .. 1000 milliGRAM(s) IV Intermittent once  amLODIPine   Tablet 5 milliGRAM(s) Oral daily  buDESOnide 160 MICROgram(s)/formoterol 4.5 MICROgram(s) Inhaler 2 Puff(s) Inhalation two times a day  chlorhexidine 4% Liquid 1 Application(s) Topical <User Schedule>  clopidogrel Tablet 75 milliGRAM(s) Oral daily  dextrose 50% Injectable 25 milliLiter(s) IV Push once  docusate sodium 100 milliGRAM(s) Oral three times a day  fluticasone propionate 50 MICROgram(s)/spray Nasal Spray 1 Spray(s) Both Nostrils two times a day  furosemide    Tablet 40 milliGRAM(s) Oral daily  gabapentin 300 milliGRAM(s) Oral two times a day  heparin  Injectable 5000 Unit(s) SubCutaneous every 8 hours  HYDROmorphone PCA (1 mG/mL) 30 milliLiter(s) PCA Continuous PCA Continuous  influenza   Vaccine 0.5 milliLiter(s) IntraMuscular once  insulin glargine Injectable (LANTUS) 9 Unit(s) SubCutaneous at bedtime  insulin lispro (HumaLOG) corrective regimen sliding scale   SubCutaneous every 6 hours  levothyroxine 125 MICROGram(s) Oral daily  metoprolol tartrate 25 milliGRAM(s) Oral two times a day  oxybutynin 5 milliGRAM(s) Oral two times a day  pantoprazole    Tablet 40 milliGRAM(s) Oral before breakfast  polyethylene glycol 3350 17 Gram(s) Oral daily  predniSONE   Tablet 10 milliGRAM(s) Oral two times a day  senna 2 Tablet(s) Oral at bedtime  simvastatin 20 milliGRAM(s) Oral at bedtime  sodium chloride 0.9%. 1000 milliLiter(s) (10 mL/Hr) IV Continuous <Continuous>  tiotropium 18 MICROgram(s) Capsule 1 Capsule(s) Inhalation daily  traMADol 75 milliGRAM(s) Oral three times a day    MEDICATIONS  (PRN):  ALBUTerol    90 MICROgram(s) HFA Inhaler 1 Puff(s) Inhalation every 4 hours PRN Shortness of Breath and/or Wheezing  HYDROmorphone PCA (1 mG/mL) Rescue Clinician Bolus 0.5 milliGRAM(s) IV Push every 15 minutes PRN for Pain Scale GREATER THAN 6  naloxone Injectable 0.1 milliGRAM(s) IV Push every 3 minutes PRN For ANY of the following changes in patient status:  A. RR LESS THAN 10 breaths per minute, B. Oxygen saturation LESS THAN 90%, C. Sedation score of 6  ondansetron Injectable 4 milliGRAM(s) IV Push every 6 hours PRN Nausea  sodium chloride 0.65% Nasal 1 Spray(s) Both Nostrils every 6 hours PRN Nasal Congestion      Allergies    No Known Allergies    Intolerances      Review of Systems:  Constitutional: No fever  Eyes: No blurry vision  Neuro: No tremors  HEENT: No pain  Cardiovascular: No chest pain, palpitations  Respiratory: No SOB, no cough  GI: No nausea, vomiting, abdominal pain  : No dysuria  Skin: no rash  Psych: no depression  Endocrine: no polyuria, polydipsia  Hem/lymph: no swelling  Osteoporosis: no fractures    ALL OTHER SYSTEMS REVIEWED AND NEGATIVE    UNABLE TO OBTAIN    PHYSICAL EXAM:  VITALS: T(C): 37.6 (18 @ 06:42)  T(F): 99.6 (18 @ 06:42), Max: 99.6 (18 @ 06:42)  HR: 95 (18 @ 06:42) (77 - 95)  BP: 151/81 (18 @ 06:42) (123/78 - 151/81)  RR:  (16 - 18)  SpO2:  (91% - 100%)  Wt(kg): --      GENERAL: Obese body habitus, resting in bed NAD  EYES: Mild periorbital edema  HEENT:  Atraumatic, Normocephalic, moist mucous membranes  THYROID: Normal size, no palpable nodules  RESPIRATORY: Clear to auscultation bilaterally; No rales, rhonchi, wheezing, or rubs  CARDIOVASCULAR: Regular rate and rhythm; No murmurs; 2+ edema  GI: Soft, midline dressing in place CDI. + normal BS Minimal TTP   SKIN: No rash  MUSCULOSKELETAL: swan neck deformities in hands. right healed radial surgical incision   NEURO: sensation intact, extraocular movements intact, no tremor, normal reflexes  PSYCH: Alert and oriented x 3, normal affect, normal mood  CUSHING'S SIGNS: no striae    POCT Blood Glucose.: 111 mg/dL (18 @ 06:10)  POCT Blood Glucose.: 131 mg/dL (18 @ 03:00)  POCT Blood Glucose.: 104 mg/dL (18 @ 23:44)  POCT Blood Glucose.: 81 mg/dL (18 @ 22:24)  POCT Blood Glucose.: 69 mg/dL (18 @ 21:19)  POCT Blood Glucose.: 75 mg/dL (18 @ 19:53)  POCT Blood Glucose.: 78 mg/dL (18 @ 18:08)  POCT Blood Glucose.: 127 mg/dL (18 @ 12:10)  POCT Blood Glucose.: 121 mg/dL (18 @ 04:44)  POCT Blood Glucose.: 88 mg/dL (18 @ 23:36)  POCT Blood Glucose.: 89 mg/dL (18 @ 23:35)  POCT Blood Glucose.: 102 mg/dL (18 @ 17:55)  POCT Blood Glucose.: 115 mg/dL (18 @ 11:15)  POCT Blood Glucose.: 108 mg/dL (18 @ 06:18)  POCT Blood Glucose.: 136 mg/dL (18 @ 23:09)  POCT Blood Glucose.: 180 mg/dL (18 @ 17:11)  POCT Blood Glucose.: 188 mg/dL (18 @ 12:36)                            9.8    6.97  )-----------( 259      ( 19 Dec 2018 06:11 )             32.3           143  |  100  |  19  ----------------------------<  115<H>  4.1   |  29  |  1.00    EGFR if : 70  EGFR if non : 61    Ca    8.5        Mg     2.8       Phos  2.7         TPro  6.9  /  Alb  3.6  /  TBili  0.2  /  DBili  x   /  AST  22  /  ALT  13  /  AlkPhos  61        Thyroid Function Tests:      Hemoglobin A1C, Whole Blood: 7.1 % <H> [4.0 - 5.6] (18 @ 03:50)  Hemoglobin A1C, Whole Blood: 7.3 % <H> [4.0 - 5.6] (18 @ 04:17)          Radiology:     < from: CT Abdomen and Pelvis w/ IV Cont (12.15.18 @ 19:34) >  IMPRESSION:     Diffusely dilated fluid filled small bowel loops with a transition point   in the lower abdomen (2, 83) consistent with small bowel obstruction.   Thickened jejunal folds with hyperemia at the level of the transition   point. Proximal jejunal folds are also thickened with surrounding   interloop fluid though detailed evaluation is limited secondary to   inadequate distention. Mild mesenteric edema and interloop fluid   surrounding distal jejunoileal foldswith fecalization of the ileal loops   identified. Correlate with lactic acid to assess for vascular insult.   Decompression, surgical consultation and short-term imaging follow-up is   advised.    Additional findings as mentioned above.    < end of copied text > HPI:  63 F with PMH of COPD (on home O2 (3L), hypothyroidism T2DM (HbA1c 8s 2018, on insulin), RA on prednisone (10 mg daily x 30 years), HTN, s/p  x2 presents with abdominal pain and multiple episodes of NBNB vomiting x 1 day. She was in her usual state of health until she developed sharp, intermittent epigastric cramping pain. She had a normal bowel movement this morning and has not passed flatus on day of admission. Patient was admitted on 12/15/2018. CT scan shows SBO for which she was admitted to the SICU and is s/p ex lap with lysis of adhesion on 2018 and has remained NPO since admission with brief periods of normal saline IV fluids. On admission, FSG in 120s. She was given Lantus 14 units on . However, due to FSG dropping to 80s on  and to 60s on , her dose was subsequently dropped to Lantus 7 units on . During perioperative period she was on stress dose Iv hydrocrotisone, was tapered and resumed on PO meds on . She was given IV synthroid 96 mcg x 1 during perioperative periods, transitioned to PO tablet  on . Currently NPO with sips/chips and PO meds on trickle NS @ 10 cc/hr    Endocrine hx  Patient has history of chronic prednisone intermittenly uptitrated for RA flare and/or COPD exacerbation. Prior to 2018, she had been on only glimepiride 1mg daily. During her 2018 admission for COPD exacerbation managed with uptitation of steroids, she was seen by endocrine for steroid induce hyperglycemia, at which time HbA1c 10s. She was discharged on Tresiba 18 units qhs and sitagliptin 50 mg PO due to ease of administration given RA affecting hands. Also at the time of evaluation in May, patient was noted to have elevated TSH to 30s, at which time Synthroid dosing was increased from 100 mcg to 125 mcg daily, which has been taking daily on empty stomach, last TSH 23 in 10/2018. After discharge, patient was followed at endocrine clinic in 2018 as well as PMD. She early morning early morning and late night time hypoglycemia after which tresiba had been disreplaced for NPH 18. However, her PMD added Levemir 20 units at bedtime for persistent hyperglycemia. At endo visit in 2018, regimen changed to Tresiba 30 units AM, Januvia 50, and Prandin 1 mg. Patient endorses that she still has periods where she wakes up with sugars of 60s. She intermittently has had dietary indiscretions. Post prandial FSG range from 140-180s. She has been taking insulin every other day at times due to the hyperglycemia.         PAST MEDICAL & SURGICAL HISTORY:  Diverticulosis  CVA (Cerebral Vascular Accident)  RA (Rheumatoid Arthritis)  Tooth Abscess  Arthritis  Cigarette Smoker  Chronic Asthma  Adult Hypothyroidism  Borderline Diabetes Mellitus  High Cholesterol  H/O: HTN  Wrist Disorder: S/P Surgery  History of  Section      FAMILY HISTORY:  Multiple siblings with diabetes      Social History:  Former 15 pack year smoker, quit 2 years ago; no etoh or drug abuse; lives with aide, present 7 days weekly      Outpatient Medications:    MEDICATIONS  (STANDING):  acetaminophen  IVPB .. 1000 milliGRAM(s) IV Intermittent once  acetaminophen  IVPB .. 1000 milliGRAM(s) IV Intermittent once  amLODIPine   Tablet 5 milliGRAM(s) Oral daily  buDESOnide 160 MICROgram(s)/formoterol 4.5 MICROgram(s) Inhaler 2 Puff(s) Inhalation two times a day  chlorhexidine 4% Liquid 1 Application(s) Topical <User Schedule>  clopidogrel Tablet 75 milliGRAM(s) Oral daily  dextrose 50% Injectable 25 milliLiter(s) IV Push once  docusate sodium 100 milliGRAM(s) Oral three times a day  fluticasone propionate 50 MICROgram(s)/spray Nasal Spray 1 Spray(s) Both Nostrils two times a day  furosemide    Tablet 40 milliGRAM(s) Oral daily  gabapentin 300 milliGRAM(s) Oral two times a day  heparin  Injectable 5000 Unit(s) SubCutaneous every 8 hours  HYDROmorphone PCA (1 mG/mL) 30 milliLiter(s) PCA Continuous PCA Continuous  influenza   Vaccine 0.5 milliLiter(s) IntraMuscular once  insulin glargine Injectable (LANTUS) 9 Unit(s) SubCutaneous at bedtime  insulin lispro (HumaLOG) corrective regimen sliding scale   SubCutaneous every 6 hours  levothyroxine 125 MICROGram(s) Oral daily  metoprolol tartrate 25 milliGRAM(s) Oral two times a day  oxybutynin 5 milliGRAM(s) Oral two times a day  pantoprazole    Tablet 40 milliGRAM(s) Oral before breakfast  polyethylene glycol 3350 17 Gram(s) Oral daily  predniSONE   Tablet 10 milliGRAM(s) Oral two times a day  senna 2 Tablet(s) Oral at bedtime  simvastatin 20 milliGRAM(s) Oral at bedtime  sodium chloride 0.9%. 1000 milliLiter(s) (10 mL/Hr) IV Continuous <Continuous>  tiotropium 18 MICROgram(s) Capsule 1 Capsule(s) Inhalation daily  traMADol 75 milliGRAM(s) Oral three times a day    MEDICATIONS  (PRN):  ALBUTerol    90 MICROgram(s) HFA Inhaler 1 Puff(s) Inhalation every 4 hours PRN Shortness of Breath and/or Wheezing  HYDROmorphone PCA (1 mG/mL) Rescue Clinician Bolus 0.5 milliGRAM(s) IV Push every 15 minutes PRN for Pain Scale GREATER THAN 6  naloxone Injectable 0.1 milliGRAM(s) IV Push every 3 minutes PRN For ANY of the following changes in patient status:  A. RR LESS THAN 10 breaths per minute, B. Oxygen saturation LESS THAN 90%, C. Sedation score of 6  ondansetron Injectable 4 milliGRAM(s) IV Push every 6 hours PRN Nausea  sodium chloride 0.65% Nasal 1 Spray(s) Both Nostrils every 6 hours PRN Nasal Congestion      Allergies    No Known Allergies    Intolerances      Review of Systems:  Constitutional: No fever  Eyes: No blurry vision  Neuro: No tremors  HEENT: No pain  Cardiovascular: No chest pain, palpitations  Respiratory: No SOB, no cough  GI: No nausea, vomiting, abdominal pain  : No dysuria  Skin: no rash  Psych: no depression  Endocrine: no polyuria, polydipsia  Hem/lymph: no swelling  Osteoporosis: no fractures    ALL OTHER SYSTEMS REVIEWED AND NEGATIVE    UNABLE TO OBTAIN    PHYSICAL EXAM:  VITALS: T(C): 37.6 (18 @ 06:42)  T(F): 99.6 (18 @ 06:42), Max: 99.6 (18 @ 06:42)  HR: 95 (18 @ 06:42) (77 - 95)  BP: 151/81 (18 @ 06:42) (123/78 - 151/81)  RR:  (16 - 18)  SpO2:  (91% - 100%)  Wt(kg): --      GENERAL: Obese body habitus, resting in bed NAD  EYES: Mild periorbital edema  HEENT:  Atraumatic, Normocephalic, moist mucous membranes  THYROID: Normal size, no palpable nodules  RESPIRATORY: Clear to auscultation bilaterally; No rales, rhonchi, wheezing, or rubs  CARDIOVASCULAR: Regular rate and rhythm; No murmurs; 2+ edema  GI: Soft, midline dressing in place CDI. + normal BS Minimal TTP   SKIN: No rash  MUSCULOSKELETAL: swan neck deformities in hands. right healed radial surgical incision   NEURO: sensation intact, extraocular movements intact, no tremor, normal reflexes  PSYCH: Alert and oriented x 3, normal affect, normal mood  CUSHING'S SIGNS: no striae    POCT Blood Glucose.: 111 mg/dL (18 @ 06:10)  POCT Blood Glucose.: 131 mg/dL (18 @ 03:00)  POCT Blood Glucose.: 104 mg/dL (18 @ 23:44)  POCT Blood Glucose.: 81 mg/dL (18 @ 22:24)  POCT Blood Glucose.: 69 mg/dL (18 @ 21:19)  POCT Blood Glucose.: 75 mg/dL (18 @ 19:53)  POCT Blood Glucose.: 78 mg/dL (18 @ 18:08)  POCT Blood Glucose.: 127 mg/dL (18 @ 12:10)  POCT Blood Glucose.: 121 mg/dL (18 @ 04:44)  POCT Blood Glucose.: 88 mg/dL (18 @ 23:36)  POCT Blood Glucose.: 89 mg/dL (18 @ 23:35)  POCT Blood Glucose.: 102 mg/dL (18 @ 17:55)  POCT Blood Glucose.: 115 mg/dL (18 @ 11:15)  POCT Blood Glucose.: 108 mg/dL (18 @ 06:18)  POCT Blood Glucose.: 136 mg/dL (18 @ 23:09)  POCT Blood Glucose.: 180 mg/dL (18 @ 17:11)  POCT Blood Glucose.: 188 mg/dL (18 @ 12:36)                            9.8    6.97  )-----------( 259      ( 19 Dec 2018 06:11 )             32.3           143  |  100  |  19  ----------------------------<  115<H>  4.1   |  29  |  1.00    EGFR if : 70  EGFR if non : 61    Ca    8.5        Mg     2.8       Phos  2.7         TPro  6.9  /  Alb  3.6  /  TBili  0.2  /  DBili  x   /  AST  22  /  ALT  13  /  AlkPhos  61        Thyroid Function Tests:      Hemoglobin A1C, Whole Blood: 7.1 % <H> [4.0 - 5.6] (18 @ 03:50)  Hemoglobin A1C, Whole Blood: 7.3 % <H> [4.0 - 5.6] (18 @ 04:17)          Radiology:     < from: CT Abdomen and Pelvis w/ IV Cont (12.15.18 @ 19:34) >  IMPRESSION:     Diffusely dilated fluid filled small bowel loops with a transition point   in the lower abdomen (2, 83) consistent with small bowel obstruction.   Thickened jejunal folds with hyperemia at the level of the transition   point. Proximal jejunal folds are also thickened with surrounding   interloop fluid though detailed evaluation is limited secondary to   inadequate distention. Mild mesenteric edema and interloop fluid   surrounding distal jejunoileal foldswith fecalization of the ileal loops   identified. Correlate with lactic acid to assess for vascular insult.   Decompression, surgical consultation and short-term imaging follow-up is   advised.    Additional findings as mentioned above.    < end of copied text > HPI:  63 F with PMH of COPD (on home O2 (3L), hypothyroidism T2DM (HbA1c 8s 2018, on insulin), RA on prednisone (10 mg daily x 30 years), HTN, s/p  x2 presents with abdominal pain and multiple episodes of NBNB vomiting x 1 day. She was in her usual state of health until she developed sharp, intermittent epigastric cramping pain. She had a normal bowel movement this morning and has not passed flatus on day of admission. Patient was admitted on 12/15/2018. CT scan shows SBO for which she was admitted to the SICU and is s/p ex lap with lysis of adhesion on 2018 and has remained NPO since admission with brief periods of normal saline IV fluids. On admission, FSG in 120s. She was given Lantus 14 units on . However, due to FSG dropping to 80s on  and to 60s on , her dose was subsequently dropped to Lantus 7 units on . During perioperative period she was on stress dose Iv hydrocrotisone, was tapered and resumed on PO meds on . She was given IV synthroid 96 mcg x 1 during perioperative periods, transitioned to PO tablet  on . Currently NPO with sips/chips and PO meds on trickle NS @ 10 cc/hr    Endocrine hx  Patient has history of chronic prednisone intermittenly uptitrated for RA flare and/or COPD exacerbation. Prior to 2018, she had been on only glimepiride 1mg daily. During her 2018 admission for COPD exacerbation managed with uptitation of steroids, she was seen by endocrine for steroid induce hyperglycemia, at which time HbA1c 10s. She was discharged on Tresiba 18 units qhs and sitagliptin 50 mg PO due to ease of administration given RA affecting hands. Also at the time of evaluation in May, patient was noted to have elevated TSH to 30s, at which time Synthroid dosing was increased from 100 mcg to 125 mcg daily, which has been taking daily on empty stomach, last TSH 23 in 10/2018. After discharge, patient was followed at endocrine clinic in 2018 as well as PMD. She early morning early morning and late night time hypoglycemia after which tresiba had been disreplaced for NPH 18. However, her PMD added Levemir 20 units at bedtime for persistent hyperglycemia. At endo visit in 2018, regimen changed to Tresiba 30 units AM, Januvia 50, and Prandin 1 mg. Patient endorses that she still has periods where she wakes up with sugars of 60s. She self lowered tresiba to 20 units and still reports having some hypoglycemia on this dose. She intermittently has had dietary indiscretions. Post prandial FSG range from 140-180s. She has been taking insulin every other day at times due to the hyperglycemia.         PAST MEDICAL & SURGICAL HISTORY:  Diverticulosis  CVA (Cerebral Vascular Accident)  RA (Rheumatoid Arthritis)  Tooth Abscess  Arthritis  Cigarette Smoker  Chronic Asthma  Adult Hypothyroidism  Borderline Diabetes Mellitus  High Cholesterol  H/O: HTN  Wrist Disorder: S/P Surgery  History of  Section      FAMILY HISTORY:  Multiple siblings with diabetes      Social History:  Former 15 pack year smoker, quit 2 years ago; no etoh or drug abuse; lives with aide, present 7 days weekly      Outpatient Medications:    MEDICATIONS  (STANDING):  acetaminophen  IVPB .. 1000 milliGRAM(s) IV Intermittent once  acetaminophen  IVPB .. 1000 milliGRAM(s) IV Intermittent once  amLODIPine   Tablet 5 milliGRAM(s) Oral daily  buDESOnide 160 MICROgram(s)/formoterol 4.5 MICROgram(s) Inhaler 2 Puff(s) Inhalation two times a day  chlorhexidine 4% Liquid 1 Application(s) Topical <User Schedule>  clopidogrel Tablet 75 milliGRAM(s) Oral daily  dextrose 50% Injectable 25 milliLiter(s) IV Push once  docusate sodium 100 milliGRAM(s) Oral three times a day  fluticasone propionate 50 MICROgram(s)/spray Nasal Spray 1 Spray(s) Both Nostrils two times a day  furosemide    Tablet 40 milliGRAM(s) Oral daily  gabapentin 300 milliGRAM(s) Oral two times a day  heparin  Injectable 5000 Unit(s) SubCutaneous every 8 hours  HYDROmorphone PCA (1 mG/mL) 30 milliLiter(s) PCA Continuous PCA Continuous  influenza   Vaccine 0.5 milliLiter(s) IntraMuscular once  insulin glargine Injectable (LANTUS) 9 Unit(s) SubCutaneous at bedtime  insulin lispro (HumaLOG) corrective regimen sliding scale   SubCutaneous every 6 hours  levothyroxine 125 MICROGram(s) Oral daily  metoprolol tartrate 25 milliGRAM(s) Oral two times a day  oxybutynin 5 milliGRAM(s) Oral two times a day  pantoprazole    Tablet 40 milliGRAM(s) Oral before breakfast  polyethylene glycol 3350 17 Gram(s) Oral daily  predniSONE   Tablet 10 milliGRAM(s) Oral two times a day  senna 2 Tablet(s) Oral at bedtime  simvastatin 20 milliGRAM(s) Oral at bedtime  sodium chloride 0.9%. 1000 milliLiter(s) (10 mL/Hr) IV Continuous <Continuous>  tiotropium 18 MICROgram(s) Capsule 1 Capsule(s) Inhalation daily  traMADol 75 milliGRAM(s) Oral three times a day    MEDICATIONS  (PRN):  ALBUTerol    90 MICROgram(s) HFA Inhaler 1 Puff(s) Inhalation every 4 hours PRN Shortness of Breath and/or Wheezing  HYDROmorphone PCA (1 mG/mL) Rescue Clinician Bolus 0.5 milliGRAM(s) IV Push every 15 minutes PRN for Pain Scale GREATER THAN 6  naloxone Injectable 0.1 milliGRAM(s) IV Push every 3 minutes PRN For ANY of the following changes in patient status:  A. RR LESS THAN 10 breaths per minute, B. Oxygen saturation LESS THAN 90%, C. Sedation score of 6  ondansetron Injectable 4 milliGRAM(s) IV Push every 6 hours PRN Nausea  sodium chloride 0.65% Nasal 1 Spray(s) Both Nostrils every 6 hours PRN Nasal Congestion      Allergies    No Known Allergies    Intolerances      Review of Systems:  Constitutional: No fever  Eyes: No blurry vision  Neuro: No tremors  HEENT: No pain  Cardiovascular: No chest pain, palpitations  Respiratory: No SOB, no cough  GI: No nausea, vomiting, abdominal pain  : No dysuria  Skin: no rash  Psych: no depression  Endocrine: no polyuria, polydipsia  Hem/lymph: no swelling  Osteoporosis: no fractures    ALL OTHER SYSTEMS REVIEWED AND NEGATIVE      PHYSICAL EXAM:  VITALS: T(C): 37.6 (18 @ 06:42)  T(F): 99.6 (18 @ 06:42), Max: 99.6 (18 @ 06:42)  HR: 95 (18 @ 06:42) (77 - 95)  BP: 151/81 (18 @ 06:42) (123/78 - 151/81)  RR:  (16 - 18)  SpO2:  (91% - 100%)  Wt(kg): --      GENERAL: Obese body habitus, resting in bed NAD  EYES: Mild periorbital edema  HEENT:  Atraumatic, Normocephalic, moist mucous membranes  THYROID: Normal size, no palpable nodules  RESPIRATORY: Clear to auscultation bilaterally; No rales, rhonchi, wheezing, or rubs  CARDIOVASCULAR: Regular rate and rhythm; No murmurs; 2+ edema  GI: Soft, midline dressing in place CDI. + normal BS Minimal TTP   SKIN: No rash  MUSCULOSKELETAL: swan neck deformities in hands. right healed radial surgical incision   NEURO: sensation intact, extraocular movements intact, no tremor, normal reflexes  PSYCH: Alert and oriented x 3, normal affect, normal mood  CUSHING'S SIGNS: no striae    POCT Blood Glucose.: 111 mg/dL (18 @ 06:10)  POCT Blood Glucose.: 131 mg/dL (18 @ 03:00)  POCT Blood Glucose.: 104 mg/dL (18 @ 23:44)  POCT Blood Glucose.: 81 mg/dL (18 @ 22:24)  POCT Blood Glucose.: 69 mg/dL (18 @ 21:19)  POCT Blood Glucose.: 75 mg/dL (18 @ 19:53)  POCT Blood Glucose.: 78 mg/dL (18 @ 18:08)  POCT Blood Glucose.: 127 mg/dL (18 @ 12:10)  POCT Blood Glucose.: 121 mg/dL (18 @ 04:44)  POCT Blood Glucose.: 88 mg/dL (18 @ 23:36)  POCT Blood Glucose.: 89 mg/dL (18 @ 23:35)  POCT Blood Glucose.: 102 mg/dL (18 @ 17:55)  POCT Blood Glucose.: 115 mg/dL (18 @ 11:15)  POCT Blood Glucose.: 108 mg/dL (18 @ 06:18)  POCT Blood Glucose.: 136 mg/dL (18 @ 23:09)  POCT Blood Glucose.: 180 mg/dL (18 @ 17:11)  POCT Blood Glucose.: 188 mg/dL (18 @ 12:36)                            9.8    6.97  )-----------( 259      ( 19 Dec 2018 06:11 )             32.3           143  |  100  |  19  ----------------------------<  115<H>  4.1   |  29  |  1.00    EGFR if : 70  EGFR if non : 61    Ca    8.5        Mg     2.8       Phos  2.7         TPro  6.9  /  Alb  3.6  /  TBili  0.2  /  DBili  x   /  AST  22  /  ALT  13  /  AlkPhos  61        Thyroid Function Tests:      Hemoglobin A1C, Whole Blood: 7.1 % <H> [4.0 - 5.6] (18 @ 03:50)  Hemoglobin A1C, Whole Blood: 7.3 % <H> [4.0 - 5.6] (18 @ 04:17)          Radiology:     < from: CT Abdomen and Pelvis w/ IV Cont (12.15.18 @ 19:34) >  IMPRESSION:     Diffusely dilated fluid filled small bowel loops with a transition point   in the lower abdomen (2, 83) consistent with small bowel obstruction.   Thickened jejunal folds with hyperemia at the level of the transition   point. Proximal jejunal folds are also thickened with surrounding   interloop fluid though detailed evaluation is limited secondary to   inadequate distention. Mild mesenteric edema and interloop fluid   surrounding distal jejunoileal foldswith fecalization of the ileal loops   identified. Correlate with lactic acid to assess for vascular insult.   Decompression, surgical consultation and short-term imaging follow-up is   advised.    Additional findings as mentioned above.    < end of copied text >

## 2018-12-19 NOTE — PROGRESS NOTE ADULT - SUBJECTIVE AND OBJECTIVE BOX
Surgery Progress Note    S: Patient seen and examined. No acute events overnight. Pain well controlled. OOB in chair. Denies nausea or vomiting. Reports passing some flatus.     O:  Vital Signs Last 24 Hrs  T(C): 37.6 (19 Dec 2018 06:42), Max: 37.6 (19 Dec 2018 06:42)  T(F): 99.6 (19 Dec 2018 06:42), Max: 99.6 (19 Dec 2018 06:42)  HR: 95 (19 Dec 2018 06:42) (76 - 95)  BP: 151/81 (19 Dec 2018 06:42) (123/78 - 151/81)  BP(mean): --  RR: 18 (19 Dec 2018 06:42) (16 - 18)  SpO2: 91% (19 Dec 2018 06:42) (91% - 100%)    I&O's Detail    18 Dec 2018 07:01  -  19 Dec 2018 07:00  --------------------------------------------------------  IN:    sodium chloride 0.9%.: 160 mL  Total IN: 160 mL    OUT:    Indwelling Catheter - Urethral: 1350 mL  Total OUT: 1350 mL    Total NET: -1190 mL          MEDICATIONS  (STANDING):  acetaminophen  IVPB .. 1000 milliGRAM(s) IV Intermittent once  acetaminophen  IVPB .. 1000 milliGRAM(s) IV Intermittent once  acetaminophen  IVPB .. 1000 milliGRAM(s) IV Intermittent once  amLODIPine   Tablet 5 milliGRAM(s) Oral daily  buDESOnide 160 MICROgram(s)/formoterol 4.5 MICROgram(s) Inhaler 2 Puff(s) Inhalation two times a day  chlorhexidine 4% Liquid 1 Application(s) Topical <User Schedule>  dextrose 50% Injectable 25 milliLiter(s) IV Push once  furosemide    Tablet 40 milliGRAM(s) Oral daily  gabapentin 300 milliGRAM(s) Oral two times a day  heparin  Injectable 5000 Unit(s) SubCutaneous every 8 hours  HYDROmorphone PCA (1 mG/mL) 30 milliLiter(s) PCA Continuous PCA Continuous  influenza   Vaccine 0.5 milliLiter(s) IntraMuscular once  insulin glargine Injectable (LANTUS) 9 Unit(s) SubCutaneous at bedtime  insulin lispro (HumaLOG) corrective regimen sliding scale   SubCutaneous every 6 hours  levothyroxine 125 MICROGram(s) Oral daily  metoprolol tartrate 25 milliGRAM(s) Oral two times a day  oxybutynin 5 milliGRAM(s) Oral two times a day  pantoprazole    Tablet 40 milliGRAM(s) Oral before breakfast  predniSONE   Tablet 10 milliGRAM(s) Oral two times a day  simvastatin 20 milliGRAM(s) Oral at bedtime  sodium chloride 0.9%. 1000 milliLiter(s) (10 mL/Hr) IV Continuous <Continuous>  tiotropium 18 MICROgram(s) Capsule 1 Capsule(s) Inhalation daily  traMADol 75 milliGRAM(s) Oral three times a day    MEDICATIONS  (PRN):  ALBUTerol    90 MICROgram(s) HFA Inhaler 1 Puff(s) Inhalation every 4 hours PRN Shortness of Breath and/or Wheezing  HYDROmorphone PCA (1 mG/mL) Rescue Clinician Bolus 0.5 milliGRAM(s) IV Push every 15 minutes PRN for Pain Scale GREATER THAN 6  naloxone Injectable 0.1 milliGRAM(s) IV Push every 3 minutes PRN For ANY of the following changes in patient status:  A. RR LESS THAN 10 breaths per minute, B. Oxygen saturation LESS THAN 90%, C. Sedation score of 6  ondansetron Injectable 4 milliGRAM(s) IV Push every 6 hours PRN Nausea                            9.8    6.97  )-----------( 259      ( 19 Dec 2018 06:11 )             32.3       12-19    143  |  100  |  19  ----------------------------<  115<H>  4.1   |  29  |  1.00    Ca    8.5      19 Dec 2018 06:11  Phos  2.7     12-19  Mg     2.8     12-19    TPro  6.9  /  Alb  3.6  /  TBili  0.2  /  DBili  x   /  AST  22  /  ALT  13  /  AlkPhos  61  12-19      Physical Exam:  Gen: Laying in bed, NAD  Resp: Unlabored breathing  Abd: soft, NTND  Ext: WWP  Skin: No rashes

## 2018-12-19 NOTE — PROGRESS NOTE ADULT - ATTENDING COMMENTS
Above noted. Nausea, emesis a short time ago, denies passing flatus or having a bowel movement.  Physical Exam: Afebrile.  Abdomen: Soft, moderate tenderness, incision clean.  Plan NPO, IV fluids, physical therapy.

## 2018-12-19 NOTE — PROGRESS NOTE ADULT - SUBJECTIVE AND OBJECTIVE BOX
Anesthesia Pain Management Service    SUBJECTIVE: Patient is doing well with IV PCA and no significant problems reported.    Pain Scale Score	At rest: _3__ 	With Activity: ___ 	[X ] Refer to charted pain scores    THERAPY:    [ ] IV PCA Morphine		[ ] 5 mg/mL	[ ] 1 mg/mL  [X ] IV PCA Hydromorphone	[ ] 5 mg/mL	[X ] 1 mg/mL  [ ] IV PCA Fentanyl		[ ] 50 micrograms/mL    Demand dose __0.2_ lockout __6_ (minutes) Continuous Rate _0__ Total: _7.7__  mg used (in past 24 hours)      MEDICATIONS  (STANDING):  acetaminophen  IVPB .. 1000 milliGRAM(s) IV Intermittent once  acetaminophen  IVPB .. 1000 milliGRAM(s) IV Intermittent once  amLODIPine   Tablet 5 milliGRAM(s) Oral daily  buDESOnide 160 MICROgram(s)/formoterol 4.5 MICROgram(s) Inhaler 2 Puff(s) Inhalation two times a day  chlorhexidine 4% Liquid 1 Application(s) Topical <User Schedule>  clopidogrel Tablet 75 milliGRAM(s) Oral daily  dextrose 50% Injectable 25 milliLiter(s) IV Push once  docusate sodium 100 milliGRAM(s) Oral three times a day  fluticasone propionate 50 MICROgram(s)/spray Nasal Spray 1 Spray(s) Both Nostrils two times a day  furosemide    Tablet 40 milliGRAM(s) Oral daily  gabapentin 300 milliGRAM(s) Oral two times a day  heparin  Injectable 5000 Unit(s) SubCutaneous every 8 hours  HYDROmorphone PCA (1 mG/mL) 30 milliLiter(s) PCA Continuous PCA Continuous  influenza   Vaccine 0.5 milliLiter(s) IntraMuscular once  insulin glargine Injectable (LANTUS) 9 Unit(s) SubCutaneous at bedtime  insulin lispro (HumaLOG) corrective regimen sliding scale   SubCutaneous every 6 hours  levothyroxine 125 MICROGram(s) Oral daily  metoprolol tartrate 25 milliGRAM(s) Oral two times a day  oxybutynin 5 milliGRAM(s) Oral two times a day  pantoprazole    Tablet 40 milliGRAM(s) Oral before breakfast  polyethylene glycol 3350 17 Gram(s) Oral daily  predniSONE   Tablet 10 milliGRAM(s) Oral two times a day  senna 2 Tablet(s) Oral at bedtime  simvastatin 20 milliGRAM(s) Oral at bedtime  sodium chloride 0.9%. 1000 milliLiter(s) (10 mL/Hr) IV Continuous <Continuous>  tiotropium 18 MICROgram(s) Capsule 1 Capsule(s) Inhalation daily  traMADol 75 milliGRAM(s) Oral three times a day    MEDICATIONS  (PRN):  ALBUTerol    90 MICROgram(s) HFA Inhaler 1 Puff(s) Inhalation every 4 hours PRN Shortness of Breath and/or Wheezing  HYDROmorphone PCA (1 mG/mL) Rescue Clinician Bolus 0.5 milliGRAM(s) IV Push every 15 minutes PRN for Pain Scale GREATER THAN 6  naloxone Injectable 0.1 milliGRAM(s) IV Push every 3 minutes PRN For ANY of the following changes in patient status:  A. RR LESS THAN 10 breaths per minute, B. Oxygen saturation LESS THAN 90%, C. Sedation score of 6  ondansetron Injectable 4 milliGRAM(s) IV Push every 6 hours PRN Nausea  sodium chloride 0.65% Nasal 1 Spray(s) Both Nostrils every 6 hours PRN Nasal Congestion      OBJECTIVE:    Sedation Score:	[ X] Alert	[ ] Drowsy 	[ ] Arousable	[ ] Asleep	[ ] Unresponsive    Side Effects:	[X ] None	[ ] Nausea	[ ] Vomiting	[ ] Pruritus  		[ ] Other:    Vital Signs Last 24 Hrs  T(C): 37.6 (19 Dec 2018 09:55), Max: 37.6 (19 Dec 2018 06:42)  T(F): 99.6 (19 Dec 2018 09:55), Max: 99.6 (19 Dec 2018 06:42)  HR: 92 (19 Dec 2018 10:20) (77 - 95)  BP: 146/80 (19 Dec 2018 10:20) (123/78 - 151/81)  BP(mean): --  RR: 18 (19 Dec 2018 09:55) (16 - 18)  SpO2: 97% (19 Dec 2018 09:55) (91% - 100%)    ASSESSMENT/ PLAN    Therapy to  be:	[ X] Continue   [ ] Discontinued   [ ] Change to prn Analgesics    Documentation and Verification of current medications:   [X] Done	[ ] Not done, not elligible    Comments: Recommend to continue current therapy with IV PCA, IV Tylenol, and PO tramadol and PO Neurontin.    Progress Note written now but Patient was seen earlier. Anesthesia Pain Management Service    SUBJECTIVE: Patient is doing well with IV PCA and no significant problems reported. Patient denies nausea/vomiting. Reports passing flatus.    Pain Scale Score	At rest: _3__ 	With Activity: ___ 	[X ] Refer to charted pain scores    THERAPY:    [ ] IV PCA Morphine		[ ] 5 mg/mL	[ ] 1 mg/mL  [X ] IV PCA Hydromorphone	[ ] 5 mg/mL	[X ] 1 mg/mL  [ ] IV PCA Fentanyl		[ ] 50 micrograms/mL    Demand dose __0.2_ lockout __6_ (minutes) Continuous Rate _0__ Total: _7.7__  mg used (in past 24 hours)      MEDICATIONS  (STANDING):  acetaminophen  IVPB .. 1000 milliGRAM(s) IV Intermittent once  acetaminophen  IVPB .. 1000 milliGRAM(s) IV Intermittent once  amLODIPine   Tablet 5 milliGRAM(s) Oral daily  buDESOnide 160 MICROgram(s)/formoterol 4.5 MICROgram(s) Inhaler 2 Puff(s) Inhalation two times a day  chlorhexidine 4% Liquid 1 Application(s) Topical <User Schedule>  clopidogrel Tablet 75 milliGRAM(s) Oral daily  dextrose 50% Injectable 25 milliLiter(s) IV Push once  docusate sodium 100 milliGRAM(s) Oral three times a day  fluticasone propionate 50 MICROgram(s)/spray Nasal Spray 1 Spray(s) Both Nostrils two times a day  furosemide    Tablet 40 milliGRAM(s) Oral daily  gabapentin 300 milliGRAM(s) Oral two times a day  heparin  Injectable 5000 Unit(s) SubCutaneous every 8 hours  HYDROmorphone PCA (1 mG/mL) 30 milliLiter(s) PCA Continuous PCA Continuous  influenza   Vaccine 0.5 milliLiter(s) IntraMuscular once  insulin glargine Injectable (LANTUS) 9 Unit(s) SubCutaneous at bedtime  insulin lispro (HumaLOG) corrective regimen sliding scale   SubCutaneous every 6 hours  levothyroxine 125 MICROGram(s) Oral daily  metoprolol tartrate 25 milliGRAM(s) Oral two times a day  oxybutynin 5 milliGRAM(s) Oral two times a day  pantoprazole    Tablet 40 milliGRAM(s) Oral before breakfast  polyethylene glycol 3350 17 Gram(s) Oral daily  predniSONE   Tablet 10 milliGRAM(s) Oral two times a day  senna 2 Tablet(s) Oral at bedtime  simvastatin 20 milliGRAM(s) Oral at bedtime  sodium chloride 0.9%. 1000 milliLiter(s) (10 mL/Hr) IV Continuous <Continuous>  tiotropium 18 MICROgram(s) Capsule 1 Capsule(s) Inhalation daily  traMADol 75 milliGRAM(s) Oral three times a day    MEDICATIONS  (PRN):  ALBUTerol    90 MICROgram(s) HFA Inhaler 1 Puff(s) Inhalation every 4 hours PRN Shortness of Breath and/or Wheezing  HYDROmorphone PCA (1 mG/mL) Rescue Clinician Bolus 0.5 milliGRAM(s) IV Push every 15 minutes PRN for Pain Scale GREATER THAN 6  naloxone Injectable 0.1 milliGRAM(s) IV Push every 3 minutes PRN For ANY of the following changes in patient status:  A. RR LESS THAN 10 breaths per minute, B. Oxygen saturation LESS THAN 90%, C. Sedation score of 6  ondansetron Injectable 4 milliGRAM(s) IV Push every 6 hours PRN Nausea  sodium chloride 0.65% Nasal 1 Spray(s) Both Nostrils every 6 hours PRN Nasal Congestion      OBJECTIVE:    Sedation Score:	[ X] Alert	[ ] Drowsy 	[ ] Arousable	[ ] Asleep	[ ] Unresponsive    Side Effects:	[X ] None	[ ] Nausea	[ ] Vomiting	[ ] Pruritus  		[ ] Other:    Vital Signs Last 24 Hrs  T(C): 37.6 (19 Dec 2018 09:55), Max: 37.6 (19 Dec 2018 06:42)  T(F): 99.6 (19 Dec 2018 09:55), Max: 99.6 (19 Dec 2018 06:42)  HR: 92 (19 Dec 2018 10:20) (77 - 95)  BP: 146/80 (19 Dec 2018 10:20) (123/78 - 151/81)  BP(mean): --  RR: 18 (19 Dec 2018 09:55) (16 - 18)  SpO2: 97% (19 Dec 2018 09:55) (91% - 100%)    ASSESSMENT/ PLAN    Therapy to  be:	[ X] Continue   [ ] Discontinued   [ ] Change to prn Analgesics    Documentation and Verification of current medications:   [X] Done	[ ] Not done, not elligible    Comments: Recommend to continue current therapy with IV PCA and IV Tylenol. Continue PO tramadol and PO Neurontin. Will discuss with team about increasing PO tramadol dose to home dose and addition of Toradol. Consider additional analgesic adjuvants to reduce opioid consumption.     Progress Note written now but Patient was seen earlier. Anesthesia Pain Management Service    SUBJECTIVE: Patient is doing well with IV PCA and no significant problems reported. Patient denies nausea/vomiting. Reports passing flatus.    Pain Scale Score	At rest: _3__ 	With Activity: ___ 	[X ] Refer to charted pain scores    THERAPY:    [ ] IV PCA Morphine		[ ] 5 mg/mL	[ ] 1 mg/mL  [X ] IV PCA Hydromorphone	[ ] 5 mg/mL	[X ] 1 mg/mL  [ ] IV PCA Fentanyl		[ ] 50 micrograms/mL    Demand dose __0.2_ lockout __6_ (minutes) Continuous Rate _0__ Total: _7.7__  mg used (in past 24 hours)      MEDICATIONS  (STANDING):  acetaminophen  IVPB .. 1000 milliGRAM(s) IV Intermittent once  acetaminophen  IVPB .. 1000 milliGRAM(s) IV Intermittent once  amLODIPine   Tablet 5 milliGRAM(s) Oral daily  buDESOnide 160 MICROgram(s)/formoterol 4.5 MICROgram(s) Inhaler 2 Puff(s) Inhalation two times a day  chlorhexidine 4% Liquid 1 Application(s) Topical <User Schedule>  clopidogrel Tablet 75 milliGRAM(s) Oral daily  dextrose 50% Injectable 25 milliLiter(s) IV Push once  docusate sodium 100 milliGRAM(s) Oral three times a day  fluticasone propionate 50 MICROgram(s)/spray Nasal Spray 1 Spray(s) Both Nostrils two times a day  furosemide    Tablet 40 milliGRAM(s) Oral daily  gabapentin 300 milliGRAM(s) Oral two times a day  heparin  Injectable 5000 Unit(s) SubCutaneous every 8 hours  HYDROmorphone PCA (1 mG/mL) 30 milliLiter(s) PCA Continuous PCA Continuous  influenza   Vaccine 0.5 milliLiter(s) IntraMuscular once  insulin glargine Injectable (LANTUS) 9 Unit(s) SubCutaneous at bedtime  insulin lispro (HumaLOG) corrective regimen sliding scale   SubCutaneous every 6 hours  levothyroxine 125 MICROGram(s) Oral daily  metoprolol tartrate 25 milliGRAM(s) Oral two times a day  oxybutynin 5 milliGRAM(s) Oral two times a day  pantoprazole    Tablet 40 milliGRAM(s) Oral before breakfast  polyethylene glycol 3350 17 Gram(s) Oral daily  predniSONE   Tablet 10 milliGRAM(s) Oral two times a day  senna 2 Tablet(s) Oral at bedtime  simvastatin 20 milliGRAM(s) Oral at bedtime  sodium chloride 0.9%. 1000 milliLiter(s) (10 mL/Hr) IV Continuous <Continuous>  tiotropium 18 MICROgram(s) Capsule 1 Capsule(s) Inhalation daily  traMADol 75 milliGRAM(s) Oral three times a day    MEDICATIONS  (PRN):  ALBUTerol    90 MICROgram(s) HFA Inhaler 1 Puff(s) Inhalation every 4 hours PRN Shortness of Breath and/or Wheezing  HYDROmorphone PCA (1 mG/mL) Rescue Clinician Bolus 0.5 milliGRAM(s) IV Push every 15 minutes PRN for Pain Scale GREATER THAN 6  naloxone Injectable 0.1 milliGRAM(s) IV Push every 3 minutes PRN For ANY of the following changes in patient status:  A. RR LESS THAN 10 breaths per minute, B. Oxygen saturation LESS THAN 90%, C. Sedation score of 6  ondansetron Injectable 4 milliGRAM(s) IV Push every 6 hours PRN Nausea  sodium chloride 0.65% Nasal 1 Spray(s) Both Nostrils every 6 hours PRN Nasal Congestion      OBJECTIVE:    Sedation Score:	[ X] Alert	[ ] Drowsy 	[ ] Arousable	[ ] Asleep	[ ] Unresponsive    Side Effects:	[X ] None	[ ] Nausea	[ ] Vomiting	[ ] Pruritus  		[ ] Other:    Vital Signs Last 24 Hrs  T(C): 37.6 (19 Dec 2018 09:55), Max: 37.6 (19 Dec 2018 06:42)  T(F): 99.6 (19 Dec 2018 09:55), Max: 99.6 (19 Dec 2018 06:42)  HR: 92 (19 Dec 2018 10:20) (77 - 95)  BP: 146/80 (19 Dec 2018 10:20) (123/78 - 151/81)  BP(mean): --  RR: 18 (19 Dec 2018 09:55) (16 - 18)  SpO2: 97% (19 Dec 2018 09:55) (91% - 100%)    ASSESSMENT/ PLAN    Therapy to  be:	[ X] Continue   [ ] Discontinued   [ ] Change to prn Analgesics    Documentation and Verification of current medications:   [X] Done	[ ] Not done, not elligible    Comments: Recommend to continue current therapy with IV PCA and IV Tylenol. Continue PO tramadol. Will increase Neurontin dose to 400 TID. Will add tizanidine 1mg TID as additional non-opioid adjuvant analgesic. Recommend to have continuous pulse oximetry.     Progress Note written now but Patient was seen earlier.

## 2018-12-19 NOTE — CONSULT NOTE ADULT - PROBLEM SELECTOR RECOMMENDATION 3
Long standing history chronic oral steroid use. Currently on taper of stress dose steroids michael-operatively. SBP in 140s.   - currently on prednisone 10 mg BID started today  - taper down to prednisone 10 mg qd in [ ] days Long standing history chronic oral steroid use. Currently on taper of stress dose steroids michael-operatively. SBP in 140s.   - currently on prednisone 10 mg BID started today  - taper down to prednisone to 15 mg daily given michael-operative state

## 2018-12-20 LAB
ALBUMIN SERPL ELPH-MCNC: 3.5 G/DL — SIGNIFICANT CHANGE UP (ref 3.3–5)
ALP SERPL-CCNC: 58 U/L — SIGNIFICANT CHANGE UP (ref 40–120)
ALT FLD-CCNC: 14 U/L — SIGNIFICANT CHANGE UP (ref 4–33)
APPEARANCE UR: SIGNIFICANT CHANGE UP
AST SERPL-CCNC: 20 U/L — SIGNIFICANT CHANGE UP (ref 4–32)
BACTERIA # UR AUTO: HIGH
BILIRUB SERPL-MCNC: 0.2 MG/DL — SIGNIFICANT CHANGE UP (ref 0.2–1.2)
BILIRUB UR-MCNC: NEGATIVE — SIGNIFICANT CHANGE UP
BLOOD UR QL VISUAL: SIGNIFICANT CHANGE UP
BUN SERPL-MCNC: 22 MG/DL — SIGNIFICANT CHANGE UP (ref 7–23)
CALCIUM SERPL-MCNC: 8.6 MG/DL — SIGNIFICANT CHANGE UP (ref 8.4–10.5)
CHLORIDE SERPL-SCNC: 101 MMOL/L — SIGNIFICANT CHANGE UP (ref 98–107)
CO2 SERPL-SCNC: 30 MMOL/L — SIGNIFICANT CHANGE UP (ref 22–31)
COLOR SPEC: YELLOW — SIGNIFICANT CHANGE UP
CREAT SERPL-MCNC: 1.26 MG/DL — SIGNIFICANT CHANGE UP (ref 0.5–1.3)
GLUCOSE BLDC GLUCOMTR-MCNC: 74 MG/DL — SIGNIFICANT CHANGE UP (ref 70–99)
GLUCOSE BLDC GLUCOMTR-MCNC: 74 MG/DL — SIGNIFICANT CHANGE UP (ref 70–99)
GLUCOSE BLDC GLUCOMTR-MCNC: 84 MG/DL — SIGNIFICANT CHANGE UP (ref 70–99)
GLUCOSE SERPL-MCNC: 83 MG/DL — SIGNIFICANT CHANGE UP (ref 70–99)
GLUCOSE UR-MCNC: NEGATIVE — SIGNIFICANT CHANGE UP
HCT VFR BLD CALC: 35.8 % — SIGNIFICANT CHANGE UP (ref 34.5–45)
HGB BLD-MCNC: 10.4 G/DL — LOW (ref 11.5–15.5)
HYALINE CASTS # UR AUTO: HIGH
KETONES UR-MCNC: SIGNIFICANT CHANGE UP
LEUKOCYTE ESTERASE UR-ACNC: SIGNIFICANT CHANGE UP
MAGNESIUM SERPL-MCNC: 2.7 MG/DL — HIGH (ref 1.6–2.6)
MCHC RBC-ENTMCNC: 29.1 % — LOW (ref 32–36)
MCHC RBC-ENTMCNC: 30.7 PG — SIGNIFICANT CHANGE UP (ref 27–34)
MCV RBC AUTO: 105.6 FL — HIGH (ref 80–100)
NITRITE UR-MCNC: POSITIVE — HIGH
NRBC # FLD: 0 — SIGNIFICANT CHANGE UP
PH UR: 6 — SIGNIFICANT CHANGE UP (ref 5–8)
PHOSPHATE SERPL-MCNC: 2.7 MG/DL — SIGNIFICANT CHANGE UP (ref 2.5–4.5)
PLATELET # BLD AUTO: 302 K/UL — SIGNIFICANT CHANGE UP (ref 150–400)
PMV BLD: 10.2 FL — SIGNIFICANT CHANGE UP (ref 7–13)
POTASSIUM SERPL-MCNC: 3.6 MMOL/L — SIGNIFICANT CHANGE UP (ref 3.5–5.3)
POTASSIUM SERPL-SCNC: 3.6 MMOL/L — SIGNIFICANT CHANGE UP (ref 3.5–5.3)
PROT SERPL-MCNC: 6.8 G/DL — SIGNIFICANT CHANGE UP (ref 6–8.3)
PROT UR-MCNC: 50 — SIGNIFICANT CHANGE UP
RBC # BLD: 3.39 M/UL — LOW (ref 3.8–5.2)
RBC # FLD: 14.6 % — HIGH (ref 10.3–14.5)
RBC CASTS # UR COMP ASSIST: SIGNIFICANT CHANGE UP (ref 0–?)
SODIUM SERPL-SCNC: 143 MMOL/L — SIGNIFICANT CHANGE UP (ref 135–145)
SP GR SPEC: 1.02 — SIGNIFICANT CHANGE UP (ref 1–1.04)
SQUAMOUS # UR AUTO: SIGNIFICANT CHANGE UP
T4 FREE SERPL-MCNC: 0.98 NG/DL — SIGNIFICANT CHANGE UP (ref 0.9–1.8)
TSH SERPL-MCNC: 40.15 UIU/ML — HIGH (ref 0.27–4.2)
UROBILINOGEN FLD QL: NORMAL — SIGNIFICANT CHANGE UP
WBC # BLD: 7.37 K/UL — SIGNIFICANT CHANGE UP (ref 3.8–10.5)
WBC # FLD AUTO: 7.37 K/UL — SIGNIFICANT CHANGE UP (ref 3.8–10.5)
WBC UR QL: >50 — HIGH (ref 0–?)

## 2018-12-20 PROCEDURE — 71045 X-RAY EXAM CHEST 1 VIEW: CPT | Mod: 26

## 2018-12-20 PROCEDURE — 99232 SBSQ HOSP IP/OBS MODERATE 35: CPT | Mod: GC

## 2018-12-20 RX ORDER — LEVOTHYROXINE SODIUM 125 MCG
103 TABLET ORAL AT BEDTIME
Qty: 0 | Refills: 0 | Status: DISCONTINUED | OUTPATIENT
Start: 2018-12-20 | End: 2018-12-22

## 2018-12-20 RX ORDER — ACETAMINOPHEN 500 MG
1000 TABLET ORAL EVERY 6 HOURS
Qty: 0 | Refills: 0 | Status: COMPLETED | OUTPATIENT
Start: 2018-12-20 | End: 2018-12-20

## 2018-12-20 RX ORDER — INSULIN GLARGINE 100 [IU]/ML
3 INJECTION, SOLUTION SUBCUTANEOUS AT BEDTIME
Qty: 0 | Refills: 0 | Status: DISCONTINUED | OUTPATIENT
Start: 2018-12-20 | End: 2018-12-22

## 2018-12-20 RX ORDER — CIPROFLOXACIN LACTATE 400MG/40ML
400 VIAL (ML) INTRAVENOUS EVERY 12 HOURS
Qty: 0 | Refills: 0 | Status: DISCONTINUED | OUTPATIENT
Start: 2018-12-21 | End: 2018-12-24

## 2018-12-20 RX ORDER — CIPROFLOXACIN LACTATE 400MG/40ML
VIAL (ML) INTRAVENOUS
Qty: 0 | Refills: 0 | Status: DISCONTINUED | OUTPATIENT
Start: 2018-12-20 | End: 2018-12-24

## 2018-12-20 RX ORDER — FUROSEMIDE 40 MG
40 TABLET ORAL DAILY
Qty: 0 | Refills: 0 | Status: DISCONTINUED | OUTPATIENT
Start: 2018-12-21 | End: 2018-12-21

## 2018-12-20 RX ORDER — CIPROFLOXACIN LACTATE 400MG/40ML
400 VIAL (ML) INTRAVENOUS ONCE
Qty: 0 | Refills: 0 | Status: COMPLETED | OUTPATIENT
Start: 2018-12-20 | End: 2018-12-20

## 2018-12-20 RX ORDER — ACETAMINOPHEN 500 MG
1000 TABLET ORAL EVERY 6 HOURS
Qty: 0 | Refills: 0 | Status: COMPLETED | OUTPATIENT
Start: 2018-12-20 | End: 2019-11-18

## 2018-12-20 RX ORDER — LEVOTHYROXINE SODIUM 125 MCG
62.5 TABLET ORAL AT BEDTIME
Qty: 0 | Refills: 0 | Status: DISCONTINUED | OUTPATIENT
Start: 2018-12-20 | End: 2018-12-20

## 2018-12-20 RX ORDER — POTASSIUM CHLORIDE 20 MEQ
10 PACKET (EA) ORAL
Qty: 0 | Refills: 0 | Status: COMPLETED | OUTPATIENT
Start: 2018-12-20 | End: 2018-12-20

## 2018-12-20 RX ADMIN — Medication 100 MILLIGRAM(S): at 06:27

## 2018-12-20 RX ADMIN — BUDESONIDE AND FORMOTEROL FUMARATE DIHYDRATE 2 PUFF(S): 160; 4.5 AEROSOL RESPIRATORY (INHALATION) at 22:03

## 2018-12-20 RX ADMIN — HEPARIN SODIUM 5000 UNIT(S): 5000 INJECTION INTRAVENOUS; SUBCUTANEOUS at 14:31

## 2018-12-20 RX ADMIN — Medication 200 MILLIGRAM(S): at 18:23

## 2018-12-20 RX ADMIN — Medication 400 MILLIGRAM(S): at 01:58

## 2018-12-20 RX ADMIN — Medication 5 MILLIGRAM(S): at 06:27

## 2018-12-20 RX ADMIN — Medication 1000 MILLIGRAM(S): at 02:28

## 2018-12-20 RX ADMIN — TRAMADOL HYDROCHLORIDE 75 MILLIGRAM(S): 50 TABLET ORAL at 22:52

## 2018-12-20 RX ADMIN — INSULIN GLARGINE 7 UNIT(S): 100 INJECTION, SOLUTION SUBCUTANEOUS at 00:05

## 2018-12-20 RX ADMIN — SODIUM CHLORIDE 30 MILLILITER(S): 9 INJECTION, SOLUTION INTRAVENOUS at 00:04

## 2018-12-20 RX ADMIN — Medication 10 MILLIGRAM(S): at 06:27

## 2018-12-20 RX ADMIN — Medication 25 MILLIGRAM(S): at 06:27

## 2018-12-20 RX ADMIN — Medication 100 MILLIEQUIVALENT(S): at 14:31

## 2018-12-20 RX ADMIN — TRAMADOL HYDROCHLORIDE 75 MILLIGRAM(S): 50 TABLET ORAL at 23:15

## 2018-12-20 RX ADMIN — GABAPENTIN 400 MILLIGRAM(S): 400 CAPSULE ORAL at 22:52

## 2018-12-20 RX ADMIN — Medication 400 MILLIGRAM(S): at 15:24

## 2018-12-20 RX ADMIN — BUDESONIDE AND FORMOTEROL FUMARATE DIHYDRATE 2 PUFF(S): 160; 4.5 AEROSOL RESPIRATORY (INHALATION) at 10:35

## 2018-12-20 RX ADMIN — Medication 40 MILLIGRAM(S): at 06:27

## 2018-12-20 RX ADMIN — TRAMADOL HYDROCHLORIDE 75 MILLIGRAM(S): 50 TABLET ORAL at 07:05

## 2018-12-20 RX ADMIN — SENNA PLUS 2 TABLET(S): 8.6 TABLET ORAL at 22:44

## 2018-12-20 RX ADMIN — TIZANIDINE 1 MILLIGRAM(S): 4 TABLET ORAL at 01:48

## 2018-12-20 RX ADMIN — SODIUM CHLORIDE 30 MILLILITER(S): 9 INJECTION, SOLUTION INTRAVENOUS at 10:35

## 2018-12-20 RX ADMIN — Medication 125 MICROGRAM(S): at 06:27

## 2018-12-20 RX ADMIN — Medication 8 MILLIGRAM(S): at 17:06

## 2018-12-20 RX ADMIN — SIMVASTATIN 20 MILLIGRAM(S): 20 TABLET, FILM COATED ORAL at 22:45

## 2018-12-20 RX ADMIN — Medication 100 MILLIEQUIVALENT(S): at 10:36

## 2018-12-20 RX ADMIN — TRAMADOL HYDROCHLORIDE 75 MILLIGRAM(S): 50 TABLET ORAL at 06:29

## 2018-12-20 RX ADMIN — HYDROMORPHONE HYDROCHLORIDE 30 MILLILITER(S): 2 INJECTION INTRAMUSCULAR; INTRAVENOUS; SUBCUTANEOUS at 20:18

## 2018-12-20 RX ADMIN — TIOTROPIUM BROMIDE 1 CAPSULE(S): 18 CAPSULE ORAL; RESPIRATORY (INHALATION) at 10:36

## 2018-12-20 RX ADMIN — Medication 1 SPRAY(S): at 17:05

## 2018-12-20 RX ADMIN — Medication 1 SPRAY(S): at 06:27

## 2018-12-20 RX ADMIN — Medication 100 MILLIEQUIVALENT(S): at 12:00

## 2018-12-20 RX ADMIN — Medication 1000 MILLIGRAM(S): at 15:54

## 2018-12-20 RX ADMIN — ONDANSETRON 4 MILLIGRAM(S): 8 TABLET, FILM COATED ORAL at 22:58

## 2018-12-20 RX ADMIN — GABAPENTIN 400 MILLIGRAM(S): 400 CAPSULE ORAL at 06:27

## 2018-12-20 RX ADMIN — HYDROMORPHONE HYDROCHLORIDE 30 MILLILITER(S): 2 INJECTION INTRAMUSCULAR; INTRAVENOUS; SUBCUTANEOUS at 08:12

## 2018-12-20 RX ADMIN — ONDANSETRON 4 MILLIGRAM(S): 8 TABLET, FILM COATED ORAL at 15:23

## 2018-12-20 RX ADMIN — TIZANIDINE 1 MILLIGRAM(S): 4 TABLET ORAL at 08:16

## 2018-12-20 RX ADMIN — HEPARIN SODIUM 5000 UNIT(S): 5000 INJECTION INTRAVENOUS; SUBCUTANEOUS at 22:52

## 2018-12-20 NOTE — PROGRESS NOTE ADULT - ASSESSMENT
63 F with PMH of COPD (on home O2 (3L), hypothyroidism, T2DM (HbA1c 8s 2018, on insulin), RA on prednisone (10 mg daily x 30 years), HTN, s/p  x2 admitted for SBO s/p ex lap with lysis of adhesions, complicated by poor glycemic control with periods of hyperglycemia and hypoglycemia on home regimen as well as inpatient with no oral intake in setting of SBO

## 2018-12-20 NOTE — PROGRESS NOTE ADULT - SUBJECTIVE AND OBJECTIVE BOX
Chief Complaint/Follow-up on: T2DM, Hypothyroidism    Subjective:  No acute events overnight. IVF with D5 NS increased to 30 cc/hr overnight. Received Lantus 7 units around midnight. This AM FSG 83. No hypoglyemic sx.       MEDICATIONS  (STANDING):  amLODIPine   Tablet 5 milliGRAM(s) Oral daily  buDESOnide 160 MICROgram(s)/formoterol 4.5 MICROgram(s) Inhaler 2 Puff(s) Inhalation two times a day  chlorhexidine 4% Liquid 1 Application(s) Topical <User Schedule>  clopidogrel Tablet 75 milliGRAM(s) Oral daily  dextrose 5% + sodium chloride 0.45%. 1000 milliLiter(s) (30 mL/Hr) IV Continuous <Continuous>  dextrose 50% Injectable 25 milliLiter(s) IV Push once  docusate sodium 100 milliGRAM(s) Oral three times a day  fluticasone propionate 50 MICROgram(s)/spray Nasal Spray 1 Spray(s) Both Nostrils two times a day  furosemide    Tablet 40 milliGRAM(s) Oral daily  gabapentin 400 milliGRAM(s) Oral three times a day  heparin  Injectable 5000 Unit(s) SubCutaneous every 8 hours  HYDROmorphone PCA (1 mG/mL) 30 milliLiter(s) PCA Continuous PCA Continuous  influenza   Vaccine 0.5 milliLiter(s) IntraMuscular once  insulin glargine Injectable (LANTUS) 7 Unit(s) SubCutaneous at bedtime  insulin lispro (HumaLOG) corrective regimen sliding scale   SubCutaneous every 6 hours  levothyroxine 125 MICROGram(s) Oral daily  metoprolol tartrate 25 milliGRAM(s) Oral two times a day  oxybutynin 5 milliGRAM(s) Oral two times a day  pantoprazole  Injectable 40 milliGRAM(s) IV Push once  polyethylene glycol 3350 17 Gram(s) Oral daily  potassium chloride  10 mEq/100 mL IVPB 10 milliEquivalent(s) IV Intermittent every 1 hour  predniSONE   Tablet 10 milliGRAM(s) Oral two times a day  senna 2 Tablet(s) Oral at bedtime  simvastatin 20 milliGRAM(s) Oral at bedtime  tiotropium 18 MICROgram(s) Capsule 1 Capsule(s) Inhalation daily  tiZANidine 1 milliGRAM(s) Oral every 6 hours  traMADol 75 milliGRAM(s) Oral three times a day    MEDICATIONS  (PRN):  ALBUTerol    90 MICROgram(s) HFA Inhaler 1 Puff(s) Inhalation every 4 hours PRN Shortness of Breath and/or Wheezing  glucagon  Injectable 1 milliGRAM(s) IntraMuscular once PRN Glucose LESS THAN 70 milligrams/deciliter  HYDROmorphone PCA (1 mG/mL) Rescue Clinician Bolus 0.5 milliGRAM(s) IV Push every 15 minutes PRN for Pain Scale GREATER THAN 6  naloxone Injectable 0.1 milliGRAM(s) IV Push every 3 minutes PRN For ANY of the following changes in patient status:  A. RR LESS THAN 10 breaths per minute, B. Oxygen saturation LESS THAN 90%, C. Sedation score of 6  ondansetron Injectable 4 milliGRAM(s) IV Push every 6 hours PRN Nausea  sodium chloride 0.65% Nasal 1 Spray(s) Both Nostrils every 6 hours PRN Nasal Congestion      PHYSICAL EXAM:  VITALS: T(C): 37.4 (12-20-18 @ 06:18)  T(F): 99.3 (12-20-18 @ 06:18), Max: 99.6 (12-19-18 @ 09:55)  HR: 91 (12-20-18 @ 06:18) (81 - 97)  BP: 131/77 (12-20-18 @ 06:18) (107/75 - 149/80)  RR:  (16 - 18)  SpO2:  (93% - 100%)  Wt(kg): --        GENERAL: Obese body habitus, resting in bed NAD  HEENT:  Atraumatic, Normocephalic, , moist mucous membranes  THYROID: Normal size, no palpable nodules  RESPIRATORY: Clear to auscultation bilaterally; No rales, rhonchi, wheezing, or rubs  CARDIOVASCULAR: Regular rate and rhythm; No murmurs; 2+ edema  GI: Soft, midline dressing in place CDI. + normal BS Minimal TTP   MUSCULOSKELETAL: swan neck deformities in hands. right healed radial surgical incision   PSYCH: Alert and oriented x 3, normal affect, normal mood  CUSHING'S SIGNS: Cushingoid facies, no striae    POCT Blood Glucose.: 84 mg/dL (12-20-18 @ 05:56)  POCT Blood Glucose.: 110 mg/dL (12-19-18 @ 23:55)  POCT Blood Glucose.: 97 mg/dL (12-19-18 @ 20:36)  POCT Blood Glucose.: 90 mg/dL (12-19-18 @ 17:35)  POCT Blood Glucose.: 132 mg/dL (12-19-18 @ 12:13)  POCT Blood Glucose.: 111 mg/dL (12-19-18 @ 06:10)  POCT Blood Glucose.: 131 mg/dL (12-19-18 @ 03:00)  POCT Blood Glucose.: 104 mg/dL (12-18-18 @ 23:44)  POCT Blood Glucose.: 81 mg/dL (12-18-18 @ 22:24)  POCT Blood Glucose.: 69 mg/dL (12-18-18 @ 21:19)  POCT Blood Glucose.: 75 mg/dL (12-18-18 @ 19:53)  POCT Blood Glucose.: 78 mg/dL (12-18-18 @ 18:08)  POCT Blood Glucose.: 127 mg/dL (12-18-18 @ 12:10)  POCT Blood Glucose.: 121 mg/dL (12-18-18 @ 04:44)  POCT Blood Glucose.: 88 mg/dL (12-17-18 @ 23:36)  POCT Blood Glucose.: 89 mg/dL (12-17-18 @ 23:35)  POCT Blood Glucose.: 102 mg/dL (12-17-18 @ 17:55)  POCT Blood Glucose.: 115 mg/dL (12-17-18 @ 11:15)    12-20    143  |  101  |  22  ----------------------------<  83  3.6   |  30  |  1.26    EGFR if : 53  EGFR if non : 46    Ca    8.6      12-20  Mg     2.7     12-20  Phos  2.7     12-20    TPro  6.8  /  Alb  3.5  /  TBili  0.2  /  DBili  x   /  AST  20  /  ALT  14  /  AlkPhos  58  12-20          Thyroid Function Tests:   12-20 @ 06:45 TSH 40.15 FreeT4 0.98      Hemoglobin A1C, Whole Blood: 7.1 % <H> [4.0 - 5.6] (12-17-18 @ 03:50)  Hemoglobin A1C, Whole Blood: 7.3 % <H> [4.0 - 5.6] (12-16-18 @ 04:17) Chief Complaint/Follow-up on: T2DM, Hypothyroidism    Subjective:  Patient still with multiple episodes vomiting. Attempts made by primary team to place NGT however she was unable to tolerate. Currently self oral suctioning. IVF with D5 NS increased to 30 cc/hr overnight. Received Lantus 7 units around midnight. This AM FSG 83. No hypoglyemic sx.       MEDICATIONS  (STANDING):  amLODIPine   Tablet 5 milliGRAM(s) Oral daily  buDESOnide 160 MICROgram(s)/formoterol 4.5 MICROgram(s) Inhaler 2 Puff(s) Inhalation two times a day  chlorhexidine 4% Liquid 1 Application(s) Topical <User Schedule>  clopidogrel Tablet 75 milliGRAM(s) Oral daily  dextrose 5% + sodium chloride 0.45%. 1000 milliLiter(s) (30 mL/Hr) IV Continuous <Continuous>  dextrose 50% Injectable 25 milliLiter(s) IV Push once  docusate sodium 100 milliGRAM(s) Oral three times a day  fluticasone propionate 50 MICROgram(s)/spray Nasal Spray 1 Spray(s) Both Nostrils two times a day  furosemide    Tablet 40 milliGRAM(s) Oral daily  gabapentin 400 milliGRAM(s) Oral three times a day  heparin  Injectable 5000 Unit(s) SubCutaneous every 8 hours  HYDROmorphone PCA (1 mG/mL) 30 milliLiter(s) PCA Continuous PCA Continuous  influenza   Vaccine 0.5 milliLiter(s) IntraMuscular once  insulin glargine Injectable (LANTUS) 7 Unit(s) SubCutaneous at bedtime  insulin lispro (HumaLOG) corrective regimen sliding scale   SubCutaneous every 6 hours  levothyroxine 125 MICROGram(s) Oral daily  metoprolol tartrate 25 milliGRAM(s) Oral two times a day  oxybutynin 5 milliGRAM(s) Oral two times a day  pantoprazole  Injectable 40 milliGRAM(s) IV Push once  polyethylene glycol 3350 17 Gram(s) Oral daily  potassium chloride  10 mEq/100 mL IVPB 10 milliEquivalent(s) IV Intermittent every 1 hour  predniSONE   Tablet 10 milliGRAM(s) Oral two times a day  senna 2 Tablet(s) Oral at bedtime  simvastatin 20 milliGRAM(s) Oral at bedtime  tiotropium 18 MICROgram(s) Capsule 1 Capsule(s) Inhalation daily  tiZANidine 1 milliGRAM(s) Oral every 6 hours  traMADol 75 milliGRAM(s) Oral three times a day    MEDICATIONS  (PRN):  ALBUTerol    90 MICROgram(s) HFA Inhaler 1 Puff(s) Inhalation every 4 hours PRN Shortness of Breath and/or Wheezing  glucagon  Injectable 1 milliGRAM(s) IntraMuscular once PRN Glucose LESS THAN 70 milligrams/deciliter  HYDROmorphone PCA (1 mG/mL) Rescue Clinician Bolus 0.5 milliGRAM(s) IV Push every 15 minutes PRN for Pain Scale GREATER THAN 6  naloxone Injectable 0.1 milliGRAM(s) IV Push every 3 minutes PRN For ANY of the following changes in patient status:  A. RR LESS THAN 10 breaths per minute, B. Oxygen saturation LESS THAN 90%, C. Sedation score of 6  ondansetron Injectable 4 milliGRAM(s) IV Push every 6 hours PRN Nausea  sodium chloride 0.65% Nasal 1 Spray(s) Both Nostrils every 6 hours PRN Nasal Congestion      PHYSICAL EXAM:  VITALS: T(C): 37.4 (12-20-18 @ 06:18)  T(F): 99.3 (12-20-18 @ 06:18), Max: 99.6 (12-19-18 @ 09:55)  HR: 91 (12-20-18 @ 06:18) (81 - 97)  BP: 131/77 (12-20-18 @ 06:18) (107/75 - 149/80)  RR:  (16 - 18)  SpO2:  (93% - 100%)  Wt(kg): --        GENERAL: Obese body habitus, resting in bed NAD  HEENT:  Atraumatic, Normocephalic, , moist mucous membranes  THYROID: Normal size, no palpable nodules  RESPIRATORY: Clear to auscultation bilaterally; No rales, rhonchi, wheezing, or rubs  CARDIOVASCULAR: Regular rate and rhythm; No murmurs; 2+ edema  GI: Soft, midline dressing in place CDI. + normal BS Minimal TTP   MUSCULOSKELETAL: swan neck deformities in hands. right healed radial surgical incision   PSYCH: Alert and oriented x 3, normal affect, normal mood  CUSHING'S SIGNS: Cushingoid facies, no striae    POCT Blood Glucose.: 84 mg/dL (12-20-18 @ 05:56)  POCT Blood Glucose.: 110 mg/dL (12-19-18 @ 23:55)  POCT Blood Glucose.: 97 mg/dL (12-19-18 @ 20:36)  POCT Blood Glucose.: 90 mg/dL (12-19-18 @ 17:35)  POCT Blood Glucose.: 132 mg/dL (12-19-18 @ 12:13)  POCT Blood Glucose.: 111 mg/dL (12-19-18 @ 06:10)  POCT Blood Glucose.: 131 mg/dL (12-19-18 @ 03:00)  POCT Blood Glucose.: 104 mg/dL (12-18-18 @ 23:44)  POCT Blood Glucose.: 81 mg/dL (12-18-18 @ 22:24)  POCT Blood Glucose.: 69 mg/dL (12-18-18 @ 21:19)  POCT Blood Glucose.: 75 mg/dL (12-18-18 @ 19:53)  POCT Blood Glucose.: 78 mg/dL (12-18-18 @ 18:08)  POCT Blood Glucose.: 127 mg/dL (12-18-18 @ 12:10)  POCT Blood Glucose.: 121 mg/dL (12-18-18 @ 04:44)  POCT Blood Glucose.: 88 mg/dL (12-17-18 @ 23:36)  POCT Blood Glucose.: 89 mg/dL (12-17-18 @ 23:35)  POCT Blood Glucose.: 102 mg/dL (12-17-18 @ 17:55)  POCT Blood Glucose.: 115 mg/dL (12-17-18 @ 11:15)    12-20    143  |  101  |  22  ----------------------------<  83  3.6   |  30  |  1.26    EGFR if : 53  EGFR if non : 46    Ca    8.6      12-20  Mg     2.7     12-20  Phos  2.7     12-20    TPro  6.8  /  Alb  3.5  /  TBili  0.2  /  DBili  x   /  AST  20  /  ALT  14  /  AlkPhos  58  12-20          Thyroid Function Tests:   12-20 @ 06:45 TSH 40.15 FreeT4 0.98      Hemoglobin A1C, Whole Blood: 7.1 % <H> [4.0 - 5.6] (12-17-18 @ 03:50)  Hemoglobin A1C, Whole Blood: 7.3 % <H> [4.0 - 5.6] (12-16-18 @ 04:17)

## 2018-12-20 NOTE — PROGRESS NOTE ADULT - ASSESSMENT
63F w/ multiple respiratory comorbidities p/w SBO s/p ex-lap with findings of healthy bowel.    - NPO/NGT/IVF  - Convert steroid and lasix to IV  - F/U Endocrine recs  - Continue to monitor respiratory status  - DVT ppx  - Await GI function  - encourage OOB 63F w/ multiple respiratory comorbidities p/w SBO s/p ex-lap with findings of healthy bowel.    - NPO/NGT/IVF- patient currently refusing NGT, risks and benefits of refusing NGT explained to patient, will continue to discuss with patient  - Convert steroid and lasix to IV  - F/U Endocrine recs  - Continue to monitor respiratory status  - DVT ppx  - Await GI function  - encourage OOB

## 2018-12-20 NOTE — PROVIDER CONTACT NOTE (OTHER) - SITUATION
patient's temp is 100.8,  and bp 148/97 patient's temp is 100.8,  and bp 148/97. Also since pt is still vomiting, pt is unable to tolerate pills. patient's temp is 100.8,  and bp 148/97. Also since pt is still vomiting, pt is unable to tolerate all of todays medication by mouth.

## 2018-12-20 NOTE — PROGRESS NOTE ADULT - SUBJECTIVE AND OBJECTIVE BOX
S: Patient seen and examined.  She reports an episode of bilious emesis overnight.  Denies passing flatus or having a bowel movement.  Slightly nauseous this AM.  Pain controlled.    O: Vital Signs  T(C): 37.4 (12-20 @ 10:30), Max: 37.4 (12-20 @ 06:18)  HR: 96 (12-20 @ 10:30) (81 - 97)  BP: 132/78 (12-20 @ 10:30) (107/75 - 149/80)  RR: 20 (12-20 @ 10:30) (16 - 20)  SpO2: 98% (12-20 @ 10:30) (93% - 100%)  12-19-18 @ 07:01  -  12-20-18 @ 07:00  --------------------------------------------------------  IN: 660 mL / OUT: 0 mL / NET: 660 mL      General: alert and oriented, NAD  Resp: airway patent, respirations unlabored  CVS: regular rate and rhythm  Abdomen: soft, nontender, distended                           10.4   7.37  )-----------( 302      ( 20 Dec 2018 06:45 )             35.8   12-20    143  |  101  |  22  ----------------------------<  83  3.6   |  30  |  1.26    Ca    8.6      20 Dec 2018 06:45  Phos  2.7     12-20  Mg     2.7     12-20    TPro  6.8  /  Alb  3.5  /  TBili  0.2  /  DBili  x   /  AST  20  /  ALT  14  /  AlkPhos  58  12-20

## 2018-12-20 NOTE — PROGRESS NOTE ADULT - SUBJECTIVE AND OBJECTIVE BOX
Anesthesia Pain Management Service    SUBJECTIVE: Patient is doing well with IV PCA. Patient reports nausea/vomiting (NG tube attempted by surgery team but patient was unable to tolerate placement). Patient reports improvement in pain from yesterday. Denies passing flatus or having a bowel movement.    Pain Scale Score	At rest: _3__ 	With Activity: ___ 	[X ] Refer to charted pain scores    THERAPY:    [ ] IV PCA Morphine		[ ] 5 mg/mL	[ ] 1 mg/mL  [X ] IV PCA Hydromorphone	[ ] 5 mg/mL	[X ] 1 mg/mL  [ ] IV PCA Fentanyl		[ ] 50 micrograms/mL    Demand dose __0.2_ lockout __6_ (minutes) Continuous Rate _0__ Total: _6.4__  mg used (in past 24 hours)      MEDICATIONS  (STANDING):  amLODIPine   Tablet 5 milliGRAM(s) Oral daily  buDESOnide 160 MICROgram(s)/formoterol 4.5 MICROgram(s) Inhaler 2 Puff(s) Inhalation two times a day  chlorhexidine 4% Liquid 1 Application(s) Topical <User Schedule>  clopidogrel Tablet 75 milliGRAM(s) Oral daily  dextrose 5% + sodium chloride 0.45%. 1000 milliLiter(s) (30 mL/Hr) IV Continuous <Continuous>  dextrose 50% Injectable 25 milliLiter(s) IV Push once  docusate sodium 100 milliGRAM(s) Oral three times a day  fluticasone propionate 50 MICROgram(s)/spray Nasal Spray 1 Spray(s) Both Nostrils two times a day  furosemide    Tablet 40 milliGRAM(s) Oral daily  gabapentin 400 milliGRAM(s) Oral three times a day  heparin  Injectable 5000 Unit(s) SubCutaneous every 8 hours  HYDROmorphone PCA (1 mG/mL) 30 milliLiter(s) PCA Continuous PCA Continuous  influenza   Vaccine 0.5 milliLiter(s) IntraMuscular once  insulin glargine Injectable (LANTUS) 7 Unit(s) SubCutaneous at bedtime  insulin lispro (HumaLOG) corrective regimen sliding scale   SubCutaneous every 6 hours  levothyroxine 125 MICROGram(s) Oral daily  metoprolol tartrate 25 milliGRAM(s) Oral two times a day  oxybutynin 5 milliGRAM(s) Oral two times a day  pantoprazole  Injectable 40 milliGRAM(s) IV Push once  polyethylene glycol 3350 17 Gram(s) Oral daily  potassium chloride  10 mEq/100 mL IVPB 10 milliEquivalent(s) IV Intermittent every 1 hour  predniSONE   Tablet 10 milliGRAM(s) Oral two times a day  senna 2 Tablet(s) Oral at bedtime  simvastatin 20 milliGRAM(s) Oral at bedtime  tiotropium 18 MICROgram(s) Capsule 1 Capsule(s) Inhalation daily  tiZANidine 1 milliGRAM(s) Oral every 6 hours  traMADol 75 milliGRAM(s) Oral three times a day    MEDICATIONS  (PRN):  ALBUTerol    90 MICROgram(s) HFA Inhaler 1 Puff(s) Inhalation every 4 hours PRN Shortness of Breath and/or Wheezing  glucagon  Injectable 1 milliGRAM(s) IntraMuscular once PRN Glucose LESS THAN 70 milligrams/deciliter  HYDROmorphone PCA (1 mG/mL) Rescue Clinician Bolus 0.5 milliGRAM(s) IV Push every 15 minutes PRN for Pain Scale GREATER THAN 6  naloxone Injectable 0.1 milliGRAM(s) IV Push every 3 minutes PRN For ANY of the following changes in patient status:  A. RR LESS THAN 10 breaths per minute, B. Oxygen saturation LESS THAN 90%, C. Sedation score of 6  ondansetron Injectable 4 milliGRAM(s) IV Push every 6 hours PRN Nausea  sodium chloride 0.65% Nasal 1 Spray(s) Both Nostrils every 6 hours PRN Nasal Congestion      OBJECTIVE:    Sedation Score:	[ X] Alert	[ ] Drowsy 	[ ] Arousable	[ ] Asleep	[ ] Unresponsive    Side Effects:	[X ] None	[ ] Nausea	[ ] Vomiting	[ ] Pruritus  		[ ] Other:    Vital Signs Last 24 Hrs  T(C): 37.4 (20 Dec 2018 10:30), Max: 37.4 (20 Dec 2018 06:18)  T(F): 99.3 (20 Dec 2018 10:30), Max: 99.3 (20 Dec 2018 06:18)  HR: 96 (20 Dec 2018 10:30) (81 - 97)  BP: 132/78 (20 Dec 2018 10:30) (107/75 - 149/80)  BP(mean): --  RR: 20 (20 Dec 2018 10:30) (16 - 20)  SpO2: 98% (20 Dec 2018 10:30) (93% - 100%)    ASSESSMENT/ PLAN    Therapy to  be:	[ X] Continue   [ ] Discontinued   [ ] Change to prn Analgesics    Documentation and Verification of current medications:   [X] Done	[ ] Not done, not elligible    Comments: NPO. Continue current therapy with IV PCA and IV tylenol. Increased Gabapentin dose has helped to improve pain mildly as per patient. Will consider additional PO analgesic adjuvants when able to tolerate PO intake.    Progress Note written now but Patient was seen earlier.

## 2018-12-20 NOTE — PROGRESS NOTE ADULT - PROBLEM SELECTOR PLAN 2
On synthroid 125 mcg last TSH 23 (10/2018), previously improved from 30s in 05/2018. Currently with highly elevated TSH 40s despite adherence a t  - increase synthroid to [   ] mcg qd  - reinforced appropriate administration On synthroid 125 mcg last TSH 23 (10/2018), previously improved from 30s in 05/2018. Currently with highly elevated TSH 40s despite adherence at home. Currently not tolerating PO  - switch to IV synthroid [ ] mcg until able to tolerate PO On synthroid 125 mcg last TSH 23 (10/2018), previously improved from 30s in 05/2018. Currently with highly elevated TSH 40s despite adherence at home. Currently not tolerating PO  - plan to increase to synthroid 137 mcg daily  - while NPO, please change to synthroid 103 mcg IV qd

## 2018-12-20 NOTE — PROGRESS NOTE ADULT - PROBLEM SELECTOR PLAN 1
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 On admission, HbA1c of 7.1 on tresiba 20 units, januvia, and prandin with intermittent episodes of hypoglycemia and post-prandial hyperglycemia. Currently NPO 2/2 SBO s/p ex lap  - FSG in in lower normal range  - c/w fingerstick and low correctional sliding scale q6 hrs while NPO  - consider D5 1/2 NS @ 30-40 cc/hr until able to start oral diet  - c/w lantus 7 units  - d/c likely on prandin, sitagliptin and likely lower dose tresiba  - will arrange for diabetic educator session to reinforce carb consistent diet Patient with chronic steroid induced T2DM initiated on insulin in 05/2018 for HbaA1c 10s. On admission, HbA1c of 7.1 on tresiba 20 units, januvia, and prandin with intermittent episodes of hypoglycemia and post-prandial hyperglycemia. Currently NPO 2/2 SBO s/p ex lap  - FSG in on lower end  - c/w LOW fingerstick and low correctional sliding scale q6 hrs while NPO  - consider increase D5 NS to 50 cc/hr, or to match output from gastric output  - lantus decreased to 3 units for now  - d/c likely on prandin, sitagliptin and likely lower dose tresiba pending FSG trends  - will arrange for diabetic educator session to reinforce carb consistent diet

## 2018-12-20 NOTE — PROGRESS NOTE ADULT - PROBLEM SELECTOR PLAN 3
Long standing history chronic oral steroid use. Currently on taper of stress dose steroids michael-operatively. SBP in 105-110s  - c/w prednisone 10 mg BID (started 12/19) given "sick state"  - will reassess when able to decrease to home dose Long standing history chronic oral steroid use. Currently on taper of stress dose steroids michael-operatively. SBP in 105-110s  - previously on prednisone 10 BID, currently switched to solumedrol 8 mg BID  - will reassess when able to decrease to home dose Long standing history chronic oral steroid use. Currently on taper of stress dose steroids michael-operatively. SBP in 105-110s  - previously on prednisone 10 BID, currently switched to solumedrol 8 mg BID  - c/w current dose for dose

## 2018-12-20 NOTE — CHART NOTE - NSCHARTNOTEFT_GEN_A_CORE
Patient seen and examined.  Had difficulty placing NGT with 2 attempts this AM 2/2 patient discomfort.  Attempted use of topic lidocaine.  Patient now refusing placement of NGT, despite self suctioning several hundred mL of bilious fluid from her mouth throughout the day.  Explained that she is a high risk of aspiration, the severity of aspiration, and the possibility of death.  Patient still refusing NGT placement despite multiple attempts to educate her throughout the day.  Will continue to monitor.  Keep HOB elevated.  Will return later to evaluate and discuss with patient.

## 2018-12-21 LAB
ALBUMIN SERPL ELPH-MCNC: 3.2 G/DL — LOW (ref 3.3–5)
ALP SERPL-CCNC: 63 U/L — SIGNIFICANT CHANGE UP (ref 40–120)
ALT FLD-CCNC: 11 U/L — SIGNIFICANT CHANGE UP (ref 4–33)
AST SERPL-CCNC: 15 U/L — SIGNIFICANT CHANGE UP (ref 4–32)
BILIRUB SERPL-MCNC: < 0.2 MG/DL — LOW (ref 0.2–1.2)
BUN SERPL-MCNC: 16 MG/DL — SIGNIFICANT CHANGE UP (ref 7–23)
CALCIUM SERPL-MCNC: 8.8 MG/DL — SIGNIFICANT CHANGE UP (ref 8.4–10.5)
CHLORIDE SERPL-SCNC: 98 MMOL/L — SIGNIFICANT CHANGE UP (ref 98–107)
CO2 SERPL-SCNC: 27 MMOL/L — SIGNIFICANT CHANGE UP (ref 22–31)
CREAT SERPL-MCNC: 0.99 MG/DL — SIGNIFICANT CHANGE UP (ref 0.5–1.3)
GLUCOSE BLDC GLUCOMTR-MCNC: 141 MG/DL — HIGH (ref 70–99)
GLUCOSE BLDC GLUCOMTR-MCNC: 141 MG/DL — HIGH (ref 70–99)
GLUCOSE BLDC GLUCOMTR-MCNC: 159 MG/DL — HIGH (ref 70–99)
GLUCOSE BLDC GLUCOMTR-MCNC: 190 MG/DL — HIGH (ref 70–99)
GLUCOSE SERPL-MCNC: 140 MG/DL — HIGH (ref 70–99)
HCT VFR BLD CALC: 33.3 % — LOW (ref 34.5–45)
HGB BLD-MCNC: 10.3 G/DL — LOW (ref 11.5–15.5)
MAGNESIUM SERPL-MCNC: 2.1 MG/DL — SIGNIFICANT CHANGE UP (ref 1.6–2.6)
MCHC RBC-ENTMCNC: 30.9 % — LOW (ref 32–36)
MCHC RBC-ENTMCNC: 31.5 PG — SIGNIFICANT CHANGE UP (ref 27–34)
MCV RBC AUTO: 101.8 FL — HIGH (ref 80–100)
NRBC # FLD: 0.03 — SIGNIFICANT CHANGE UP
PHOSPHATE SERPL-MCNC: 2.5 MG/DL — SIGNIFICANT CHANGE UP (ref 2.5–4.5)
PLATELET # BLD AUTO: 289 K/UL — SIGNIFICANT CHANGE UP (ref 150–400)
PMV BLD: 9.8 FL — SIGNIFICANT CHANGE UP (ref 7–13)
POTASSIUM SERPL-MCNC: 4.2 MMOL/L — SIGNIFICANT CHANGE UP (ref 3.5–5.3)
POTASSIUM SERPL-SCNC: 4.2 MMOL/L — SIGNIFICANT CHANGE UP (ref 3.5–5.3)
PROT SERPL-MCNC: 6.3 G/DL — SIGNIFICANT CHANGE UP (ref 6–8.3)
RBC # BLD: 3.27 M/UL — LOW (ref 3.8–5.2)
RBC # FLD: 14.1 % — SIGNIFICANT CHANGE UP (ref 10.3–14.5)
SODIUM SERPL-SCNC: 141 MMOL/L — SIGNIFICANT CHANGE UP (ref 135–145)
SPECIMEN SOURCE: SIGNIFICANT CHANGE UP
SPECIMEN SOURCE: SIGNIFICANT CHANGE UP
WBC # BLD: 7.26 K/UL — SIGNIFICANT CHANGE UP (ref 3.8–10.5)
WBC # FLD AUTO: 7.26 K/UL — SIGNIFICANT CHANGE UP (ref 3.8–10.5)

## 2018-12-21 PROCEDURE — 71260 CT THORAX DX C+: CPT | Mod: 26

## 2018-12-21 PROCEDURE — 71045 X-RAY EXAM CHEST 1 VIEW: CPT | Mod: 26

## 2018-12-21 PROCEDURE — 99232 SBSQ HOSP IP/OBS MODERATE 35: CPT | Mod: GC

## 2018-12-21 PROCEDURE — 74177 CT ABD & PELVIS W/CONTRAST: CPT | Mod: 26

## 2018-12-21 RX ORDER — ACETAMINOPHEN 500 MG
1000 TABLET ORAL ONCE
Qty: 0 | Refills: 0 | Status: COMPLETED | OUTPATIENT
Start: 2018-12-21 | End: 2018-12-21

## 2018-12-21 RX ORDER — TETRACAINE/BENZOCAINE/BUTAMBEN 2%-14%-2%
1 OINTMENT (GRAM) TOPICAL
Qty: 0 | Refills: 0 | Status: DISCONTINUED | OUTPATIENT
Start: 2018-12-21 | End: 2018-12-26

## 2018-12-21 RX ORDER — METOPROLOL TARTRATE 50 MG
5 TABLET ORAL EVERY 12 HOURS
Qty: 0 | Refills: 0 | Status: DISCONTINUED | OUTPATIENT
Start: 2018-12-21 | End: 2018-12-22

## 2018-12-21 RX ORDER — LIDOCAINE 4 G/100G
1 CREAM TOPICAL ONCE
Qty: 0 | Refills: 0 | Status: COMPLETED | OUTPATIENT
Start: 2018-12-21 | End: 2018-12-21

## 2018-12-21 RX ORDER — ASPIRIN/CALCIUM CARB/MAGNESIUM 324 MG
300 TABLET ORAL DAILY
Qty: 0 | Refills: 0 | Status: DISCONTINUED | OUTPATIENT
Start: 2018-12-21 | End: 2018-12-24

## 2018-12-21 RX ADMIN — HEPARIN SODIUM 5000 UNIT(S): 5000 INJECTION INTRAVENOUS; SUBCUTANEOUS at 07:11

## 2018-12-21 RX ADMIN — Medication 103 MICROGRAM(S): at 23:06

## 2018-12-21 RX ADMIN — Medication 5 MILLIGRAM(S): at 18:27

## 2018-12-21 RX ADMIN — LIDOCAINE 1 APPLICATION(S): 4 CREAM TOPICAL at 10:16

## 2018-12-21 RX ADMIN — INSULIN GLARGINE 3 UNIT(S): 100 INJECTION, SOLUTION SUBCUTANEOUS at 23:57

## 2018-12-21 RX ADMIN — PANTOPRAZOLE SODIUM 40 MILLIGRAM(S): 20 TABLET, DELAYED RELEASE ORAL at 10:55

## 2018-12-21 RX ADMIN — Medication 400 MILLIGRAM(S): at 23:07

## 2018-12-21 RX ADMIN — Medication 1 SPRAY(S): at 07:10

## 2018-12-21 RX ADMIN — Medication 1000 MILLIGRAM(S): at 23:27

## 2018-12-21 RX ADMIN — INSULIN GLARGINE 3 UNIT(S): 100 INJECTION, SOLUTION SUBCUTANEOUS at 00:30

## 2018-12-21 RX ADMIN — Medication 400 MILLIGRAM(S): at 12:42

## 2018-12-21 RX ADMIN — HYDROMORPHONE HYDROCHLORIDE 30 MILLILITER(S): 2 INJECTION INTRAMUSCULAR; INTRAVENOUS; SUBCUTANEOUS at 08:40

## 2018-12-21 RX ADMIN — CHLORHEXIDINE GLUCONATE 1 APPLICATION(S): 213 SOLUTION TOPICAL at 10:56

## 2018-12-21 RX ADMIN — SODIUM CHLORIDE 30 MILLILITER(S): 9 INJECTION, SOLUTION INTRAVENOUS at 12:44

## 2018-12-21 RX ADMIN — Medication 1: at 18:44

## 2018-12-21 RX ADMIN — AMLODIPINE BESYLATE 5 MILLIGRAM(S): 2.5 TABLET ORAL at 10:55

## 2018-12-21 RX ADMIN — Medication 25 MILLIGRAM(S): at 07:11

## 2018-12-21 RX ADMIN — BUDESONIDE AND FORMOTEROL FUMARATE DIHYDRATE 2 PUFF(S): 160; 4.5 AEROSOL RESPIRATORY (INHALATION) at 21:44

## 2018-12-21 RX ADMIN — Medication 200 MILLIGRAM(S): at 18:39

## 2018-12-21 RX ADMIN — Medication 8 MILLIGRAM(S): at 18:27

## 2018-12-21 RX ADMIN — Medication 1: at 23:57

## 2018-12-21 RX ADMIN — Medication 40 MILLIGRAM(S): at 07:11

## 2018-12-21 RX ADMIN — Medication 8 MILLIGRAM(S): at 07:11

## 2018-12-21 RX ADMIN — HYDROMORPHONE HYDROCHLORIDE 30 MILLILITER(S): 2 INJECTION INTRAMUSCULAR; INTRAVENOUS; SUBCUTANEOUS at 19:49

## 2018-12-21 RX ADMIN — Medication 200 MILLIGRAM(S): at 07:11

## 2018-12-21 RX ADMIN — TIZANIDINE 1 MILLIGRAM(S): 4 TABLET ORAL at 02:25

## 2018-12-21 RX ADMIN — HYDROMORPHONE HYDROCHLORIDE 30 MILLILITER(S): 2 INJECTION INTRAMUSCULAR; INTRAVENOUS; SUBCUTANEOUS at 20:13

## 2018-12-21 RX ADMIN — HEPARIN SODIUM 5000 UNIT(S): 5000 INJECTION INTRAVENOUS; SUBCUTANEOUS at 23:05

## 2018-12-21 RX ADMIN — Medication 5 MILLIGRAM(S): at 07:11

## 2018-12-21 RX ADMIN — GABAPENTIN 400 MILLIGRAM(S): 400 CAPSULE ORAL at 07:11

## 2018-12-21 RX ADMIN — ONDANSETRON 4 MILLIGRAM(S): 8 TABLET, FILM COATED ORAL at 07:47

## 2018-12-21 RX ADMIN — Medication 1 SPRAY(S): at 18:27

## 2018-12-21 RX ADMIN — Medication 1000 MILLIGRAM(S): at 12:43

## 2018-12-21 NOTE — PROGRESS NOTE ADULT - SUBJECTIVE AND OBJECTIVE BOX
Chief Complaint/Follow-up on:     Subjective:  Overnight with multiple episodes of bilious vomiting, self suctioning refused NGT placement despite understanding aspiration risks. FSGs since midnight in 140s, IVF @ 30 cc/hr, received Lantus 3 units at midnight. Started on Cipro      MEDICATIONS  (STANDING):  acetaminophen  IVPB .. 1000 milliGRAM(s) IV Intermittent every 6 hours  amLODIPine   Tablet 5 milliGRAM(s) Oral daily  buDESOnide 160 MICROgram(s)/formoterol 4.5 MICROgram(s) Inhaler 2 Puff(s) Inhalation two times a day  chlorhexidine 4% Liquid 1 Application(s) Topical <User Schedule>  ciprofloxacin   IVPB 400 milliGRAM(s) IV Intermittent every 12 hours  ciprofloxacin   IVPB      clopidogrel Tablet 75 milliGRAM(s) Oral daily  dextrose 5% + sodium chloride 0.45%. 1000 milliLiter(s) (30 mL/Hr) IV Continuous <Continuous>  dextrose 50% Injectable 25 milliLiter(s) IV Push once  fluticasone propionate 50 MICROgram(s)/spray Nasal Spray 1 Spray(s) Both Nostrils two times a day  furosemide   Injectable 40 milliGRAM(s) IV Push daily  gabapentin 400 milliGRAM(s) Oral three times a day  heparin  Injectable 5000 Unit(s) SubCutaneous every 8 hours  HYDROmorphone PCA (1 mG/mL) 30 milliLiter(s) PCA Continuous PCA Continuous  influenza   Vaccine 0.5 milliLiter(s) IntraMuscular once  insulin glargine Injectable (LANTUS) 3 Unit(s) SubCutaneous at bedtime  insulin lispro (HumaLOG) corrective regimen sliding scale   SubCutaneous every 6 hours  levothyroxine Injectable 103 MICROGram(s) IV Push at bedtime  lidocaine 2% Gel 1 Application(s) Topical once  methylPREDNISolone sodium succinate Injectable 8 milliGRAM(s) IV Push every 12 hours  metoprolol tartrate 25 milliGRAM(s) Oral two times a day  oxybutynin 5 milliGRAM(s) Oral two times a day  pantoprazole  Injectable 40 milliGRAM(s) IV Push once  senna 2 Tablet(s) Oral at bedtime  simvastatin 20 milliGRAM(s) Oral at bedtime  tiotropium 18 MICROgram(s) Capsule 1 Capsule(s) Inhalation daily  tiZANidine 1 milliGRAM(s) Oral every 6 hours  traMADol 75 milliGRAM(s) Oral three times a day    MEDICATIONS  (PRN):  ALBUTerol    90 MICROgram(s) HFA Inhaler 1 Puff(s) Inhalation every 4 hours PRN Shortness of Breath and/or Wheezing  glucagon  Injectable 1 milliGRAM(s) IntraMuscular once PRN Glucose LESS THAN 70 milligrams/deciliter  HYDROmorphone PCA (1 mG/mL) Rescue Clinician Bolus 0.5 milliGRAM(s) IV Push every 15 minutes PRN for Pain Scale GREATER THAN 6  naloxone Injectable 0.1 milliGRAM(s) IV Push every 3 minutes PRN For ANY of the following changes in patient status:  A. RR LESS THAN 10 breaths per minute, B. Oxygen saturation LESS THAN 90%, C. Sedation score of 6  ondansetron Injectable 4 milliGRAM(s) IV Push every 6 hours PRN Nausea  sodium chloride 0.65% Nasal 1 Spray(s) Both Nostrils every 6 hours PRN Nasal Congestion      PHYSICAL EXAM:  VITALS: T(C): 37.3 (12-21-18 @ 07:20)  T(F): 99.2 (12-21-18 @ 07:20), Max: 100.8 (12-20-18 @ 14:48)  HR: 103 (12-21-18 @ 07:20) (89 - 104)  BP: 129/61 (12-21-18 @ 07:20) (129/61 - 148/97)  RR:  (17 - 20)  SpO2:  (98% - 100%)  Wt(kg): --      GENERAL: Obese body habitus, resting in bed NAD  HEENT:  Atraumatic, Normocephalic, , moist mucous membranes  THYROID: Normal size, no palpable nodules  RESPIRATORY: Clear to auscultation bilaterally; No rales, rhonchi, wheezing, or rubs  CARDIOVASCULAR: Regular rate and rhythm; No murmurs; 2+ edema  GI: Soft, midline dressing in place CDI. + normal BS Minimal TTP   MUSCULOSKELETAL: swan neck deformities in hands. right healed radial surgical incision   PSYCH: Alert and oriented x 3, normal affect, normal mood  CUSHING'S SIGNS: Cushingoid facies, no striae    POCT Blood Glucose.: 141 mg/dL (12-21-18 @ 06:16)  POCT Blood Glucose.: 141 mg/dL (12-21-18 @ 00:16)  POCT Blood Glucose.: 74 mg/dL (12-20-18 @ 18:06)  POCT Blood Glucose.: 74 mg/dL (12-20-18 @ 12:09)  POCT Blood Glucose.: 84 mg/dL (12-20-18 @ 05:56)  POCT Blood Glucose.: 110 mg/dL (12-19-18 @ 23:55)  POCT Blood Glucose.: 97 mg/dL (12-19-18 @ 20:36)  POCT Blood Glucose.: 90 mg/dL (12-19-18 @ 17:35)  POCT Blood Glucose.: 132 mg/dL (12-19-18 @ 12:13)  POCT Blood Glucose.: 111 mg/dL (12-19-18 @ 06:10)  POCT Blood Glucose.: 131 mg/dL (12-19-18 @ 03:00)  POCT Blood Glucose.: 104 mg/dL (12-18-18 @ 23:44)  POCT Blood Glucose.: 81 mg/dL (12-18-18 @ 22:24)  POCT Blood Glucose.: 69 mg/dL (12-18-18 @ 21:19)  POCT Blood Glucose.: 75 mg/dL (12-18-18 @ 19:53)  POCT Blood Glucose.: 78 mg/dL (12-18-18 @ 18:08)  POCT Blood Glucose.: 127 mg/dL (12-18-18 @ 12:10)    12-21    141  |  98  |  16  ----------------------------<  140<H>  4.2   |  27  |  0.99    EGFR if : 71  EGFR if non : 62    Ca    8.8      12-21  Mg     2.1     12-21  Phos  2.5     12-21    TPro  6.3  /  Alb  3.2<L>  /  TBili  < 0.2<L>  /  DBili  x   /  AST  15  /  ALT  11  /  AlkPhos  63  12-21          Thyroid Function Tests:  12-20 @ 06:45 TSH 40.15 FreeT4 0.98 T3 -- Anti TPO -- Anti Thyroglobulin Ab -- TSI --      Hemoglobin A1C, Whole Blood: 7.1 % <H> [4.0 - 5.6] (12-17-18 @ 03:50)  Hemoglobin A1C, Whole Blood: 7.3 % <H> [4.0 - 5.6] (12-16-18 @ 04:17) Chief Complaint/Follow-up on:     Subjective:  Overnight with multiple episodes of bilious vomiting, NGT placed this AM with bilious output. FSGs since midnight in 140s, IVF @ 30 cc/hr, received Lantus 3 units at midnight. Started on Cipro      MEDICATIONS  (STANDING):  acetaminophen  IVPB .. 1000 milliGRAM(s) IV Intermittent every 6 hours  amLODIPine   Tablet 5 milliGRAM(s) Oral daily  buDESOnide 160 MICROgram(s)/formoterol 4.5 MICROgram(s) Inhaler 2 Puff(s) Inhalation two times a day  chlorhexidine 4% Liquid 1 Application(s) Topical <User Schedule>  ciprofloxacin   IVPB 400 milliGRAM(s) IV Intermittent every 12 hours  ciprofloxacin   IVPB      clopidogrel Tablet 75 milliGRAM(s) Oral daily  dextrose 5% + sodium chloride 0.45%. 1000 milliLiter(s) (30 mL/Hr) IV Continuous <Continuous>  dextrose 50% Injectable 25 milliLiter(s) IV Push once  fluticasone propionate 50 MICROgram(s)/spray Nasal Spray 1 Spray(s) Both Nostrils two times a day  furosemide   Injectable 40 milliGRAM(s) IV Push daily  gabapentin 400 milliGRAM(s) Oral three times a day  heparin  Injectable 5000 Unit(s) SubCutaneous every 8 hours  HYDROmorphone PCA (1 mG/mL) 30 milliLiter(s) PCA Continuous PCA Continuous  influenza   Vaccine 0.5 milliLiter(s) IntraMuscular once  insulin glargine Injectable (LANTUS) 3 Unit(s) SubCutaneous at bedtime  insulin lispro (HumaLOG) corrective regimen sliding scale   SubCutaneous every 6 hours  levothyroxine Injectable 103 MICROGram(s) IV Push at bedtime  lidocaine 2% Gel 1 Application(s) Topical once  methylPREDNISolone sodium succinate Injectable 8 milliGRAM(s) IV Push every 12 hours  metoprolol tartrate 25 milliGRAM(s) Oral two times a day  oxybutynin 5 milliGRAM(s) Oral two times a day  pantoprazole  Injectable 40 milliGRAM(s) IV Push once  senna 2 Tablet(s) Oral at bedtime  simvastatin 20 milliGRAM(s) Oral at bedtime  tiotropium 18 MICROgram(s) Capsule 1 Capsule(s) Inhalation daily  tiZANidine 1 milliGRAM(s) Oral every 6 hours  traMADol 75 milliGRAM(s) Oral three times a day    MEDICATIONS  (PRN):  ALBUTerol    90 MICROgram(s) HFA Inhaler 1 Puff(s) Inhalation every 4 hours PRN Shortness of Breath and/or Wheezing  glucagon  Injectable 1 milliGRAM(s) IntraMuscular once PRN Glucose LESS THAN 70 milligrams/deciliter  HYDROmorphone PCA (1 mG/mL) Rescue Clinician Bolus 0.5 milliGRAM(s) IV Push every 15 minutes PRN for Pain Scale GREATER THAN 6  naloxone Injectable 0.1 milliGRAM(s) IV Push every 3 minutes PRN For ANY of the following changes in patient status:  A. RR LESS THAN 10 breaths per minute, B. Oxygen saturation LESS THAN 90%, C. Sedation score of 6  ondansetron Injectable 4 milliGRAM(s) IV Push every 6 hours PRN Nausea  sodium chloride 0.65% Nasal 1 Spray(s) Both Nostrils every 6 hours PRN Nasal Congestion      PHYSICAL EXAM:  VITALS: T(C): 37.3 (12-21-18 @ 07:20)  T(F): 99.2 (12-21-18 @ 07:20), Max: 100.8 (12-20-18 @ 14:48)  HR: 103 (12-21-18 @ 07:20) (89 - 104)  BP: 129/61 (12-21-18 @ 07:20) (129/61 - 148/97)  RR:  (17 - 20)  SpO2:  (98% - 100%)  Wt(kg): --      GENERAL: Obese body habitus, resting in bed NAD  HEENT:  Atraumatic, Normocephalic, , NGT in place, bilious output in canister  THYROID: Normal size, no palpable nodules  RESPIRATORY: Clear to auscultation bilaterally; No rales, rhonchi, wheezing, or rubs  CARDIOVASCULAR: Regular rate and rhythm; No murmurs; 2+ edema  GI: Soft, midline dressing in place CDI. + normal BS Minimal TTP   MUSCULOSKELETAL: swan neck deformities in hands. right healed radial surgical incision   PSYCH: Alert and oriented x 3, normal affect, normal mood  CUSHING'S SIGNS: Cushingoid facies, no striae    POCT Blood Glucose.: 141 mg/dL (12-21-18 @ 06:16)  POCT Blood Glucose.: 141 mg/dL (12-21-18 @ 00:16)  POCT Blood Glucose.: 74 mg/dL (12-20-18 @ 18:06)  POCT Blood Glucose.: 74 mg/dL (12-20-18 @ 12:09)  POCT Blood Glucose.: 84 mg/dL (12-20-18 @ 05:56)  POCT Blood Glucose.: 110 mg/dL (12-19-18 @ 23:55)  POCT Blood Glucose.: 97 mg/dL (12-19-18 @ 20:36)  POCT Blood Glucose.: 90 mg/dL (12-19-18 @ 17:35)  POCT Blood Glucose.: 132 mg/dL (12-19-18 @ 12:13)  POCT Blood Glucose.: 111 mg/dL (12-19-18 @ 06:10)  POCT Blood Glucose.: 131 mg/dL (12-19-18 @ 03:00)  POCT Blood Glucose.: 104 mg/dL (12-18-18 @ 23:44)  POCT Blood Glucose.: 81 mg/dL (12-18-18 @ 22:24)  POCT Blood Glucose.: 69 mg/dL (12-18-18 @ 21:19)  POCT Blood Glucose.: 75 mg/dL (12-18-18 @ 19:53)  POCT Blood Glucose.: 78 mg/dL (12-18-18 @ 18:08)  POCT Blood Glucose.: 127 mg/dL (12-18-18 @ 12:10)    12-21    141  |  98  |  16  ----------------------------<  140<H>  4.2   |  27  |  0.99    EGFR if : 71  EGFR if non : 62    Ca    8.8      12-21  Mg     2.1     12-21  Phos  2.5     12-21    TPro  6.3  /  Alb  3.2<L>  /  TBili  < 0.2<L>  /  DBili  x   /  AST  15  /  ALT  11  /  AlkPhos  63  12-21          Thyroid Function Tests:  12-20 @ 06:45 TSH 40.15 FreeT4 0.98 T3 -- Anti TPO -- Anti Thyroglobulin Ab -- TSI --      Hemoglobin A1C, Whole Blood: 7.1 % <H> [4.0 - 5.6] (12-17-18 @ 03:50)  Hemoglobin A1C, Whole Blood: 7.3 % <H> [4.0 - 5.6] (12-16-18 @ 04:17)

## 2018-12-21 NOTE — PROGRESS NOTE ADULT - PROBLEM SELECTOR PLAN 3
Long standing history chronic oral steroid use. Currently on taper of stress dose steroids michael-operatively. SBP in 105-110s  - c/w solumedrol 8 mg BID (equivalent to double home dose) Long standing history chronic oral steroid use. Currently on taper of stress dose steroids michael-operatively. SBP in 120-140  - c/w solumedrol 8 mg BID (equivalent to double home dose)

## 2018-12-21 NOTE — PROGRESS NOTE ADULT - SUBJECTIVE AND OBJECTIVE BOX
Anesthesia Pain Management Service    SUBJECTIVE: Patient is doing well with IV PCA. Patient reports decreased nausea today (NG tube placed by surgery). Patient reports mild improvement in pain from yesterday. Denies passing flatus or having a bowel movement.    Pain Scale Score	At rest: ___ 	With Activity: ___ 	[X ] Refer to charted pain scores    THERAPY:    [ ] IV PCA Morphine		[ ] 5 mg/mL	[ ] 1 mg/mL  [X ] IV PCA Hydromorphone	[ ] 5 mg/mL	[X ] 1 mg/mL  [ ] IV PCA Fentanyl		[ ] 50 micrograms/mL    Demand dose __0.2_ lockout __6_ (minutes) Continuous Rate _0__ Total: _4.4__  mg used (in past 24 hours)      MEDICATIONS  (STANDING):  acetaminophen  IVPB .. 1000 milliGRAM(s) IV Intermittent every 6 hours  acetaminophen  IVPB .. 1000 milliGRAM(s) IV Intermittent once  acetaminophen  IVPB .. 1000 milliGRAM(s) IV Intermittent once  acetaminophen  IVPB .. 1000 milliGRAM(s) IV Intermittent once  amLODIPine   Tablet 5 milliGRAM(s) Oral daily  buDESOnide 160 MICROgram(s)/formoterol 4.5 MICROgram(s) Inhaler 2 Puff(s) Inhalation two times a day  chlorhexidine 4% Liquid 1 Application(s) Topical <User Schedule>  ciprofloxacin   IVPB 400 milliGRAM(s) IV Intermittent every 12 hours  ciprofloxacin   IVPB      clopidogrel Tablet 75 milliGRAM(s) Oral daily  dextrose 5% + sodium chloride 0.45%. 1000 milliLiter(s) (30 mL/Hr) IV Continuous <Continuous>  dextrose 50% Injectable 25 milliLiter(s) IV Push once  fluticasone propionate 50 MICROgram(s)/spray Nasal Spray 1 Spray(s) Both Nostrils two times a day  furosemide   Injectable 40 milliGRAM(s) IV Push daily  gabapentin 400 milliGRAM(s) Oral three times a day  heparin  Injectable 5000 Unit(s) SubCutaneous every 8 hours  HYDROmorphone PCA (1 mG/mL) 30 milliLiter(s) PCA Continuous PCA Continuous  influenza   Vaccine 0.5 milliLiter(s) IntraMuscular once  insulin glargine Injectable (LANTUS) 3 Unit(s) SubCutaneous at bedtime  insulin lispro (HumaLOG) corrective regimen sliding scale   SubCutaneous every 6 hours  levothyroxine Injectable 103 MICROGram(s) IV Push at bedtime  methylPREDNISolone sodium succinate Injectable 8 milliGRAM(s) IV Push every 12 hours  metoprolol tartrate 25 milliGRAM(s) Oral two times a day  oxybutynin 5 milliGRAM(s) Oral two times a day  senna 2 Tablet(s) Oral at bedtime  simvastatin 20 milliGRAM(s) Oral at bedtime  tiotropium 18 MICROgram(s) Capsule 1 Capsule(s) Inhalation daily  traMADol 75 milliGRAM(s) Oral three times a day    MEDICATIONS  (PRN):  ALBUTerol    90 MICROgram(s) HFA Inhaler 1 Puff(s) Inhalation every 4 hours PRN Shortness of Breath and/or Wheezing  glucagon  Injectable 1 milliGRAM(s) IntraMuscular once PRN Glucose LESS THAN 70 milligrams/deciliter  HYDROmorphone PCA (1 mG/mL) Rescue Clinician Bolus 0.5 milliGRAM(s) IV Push every 15 minutes PRN for Pain Scale GREATER THAN 6  naloxone Injectable 0.1 milliGRAM(s) IV Push every 3 minutes PRN For ANY of the following changes in patient status:  A. RR LESS THAN 10 breaths per minute, B. Oxygen saturation LESS THAN 90%, C. Sedation score of 6  ondansetron Injectable 4 milliGRAM(s) IV Push every 6 hours PRN Nausea  sodium chloride 0.65% Nasal 1 Spray(s) Both Nostrils every 6 hours PRN Nasal Congestion      OBJECTIVE:    Sedation Score:	[ X] Alert	[ ] Drowsy 	[ ] Arousable	[ ] Asleep	[ ] Unresponsive    Side Effects:	[X ] None	[ ] Nausea	[ ] Vomiting	[ ] Pruritus  		[ ] Other:    Vital Signs Last 24 Hrs  T(C): 36.7 (21 Dec 2018 10:40), Max: 38.2 (20 Dec 2018 14:48)  T(F): 98.1 (21 Dec 2018 10:40), Max: 100.8 (20 Dec 2018 14:48)  HR: 104 (21 Dec 2018 10:40) (89 - 104)  BP: 131/86 (21 Dec 2018 10:40) (129/61 - 148/97)  BP(mean): --  RR: 15 (21 Dec 2018 10:40) (15 - 20)  SpO2: 100% (21 Dec 2018 10:40) (98% - 100%)    ASSESSMENT/ PLAN    Therapy to  be:	[ X] Continue   [ ] Discontinued   [ ] Change to prn Analgesics    Documentation and Verification of current medications:   [X] Done	[ ] Not done, not elligible    Comments: NPO. Continue current therapy with IV PCA and IV Tylenol. Will d/c tizanidine. Will consider additional PO analgesic adjuvants when able to tolerate PO intake.    Progress Note written now but Patient was seen earlier.

## 2018-12-21 NOTE — PROGRESS NOTE ADULT - ATTENDING COMMENTS
Above noted. Feels better after the NG tube was placed. No flatus or bowel movement.  Physical Exam: Afebrile.  Abdomen: Incision clean.  Plan: Repeat CT Scan of the abdomen today.

## 2018-12-21 NOTE — PROGRESS NOTE ADULT - ASSESSMENT
63F w/ multiple respiratory comorbidities p/w SBO s/p ex-lap with findings of healthy bowel.    - NPO/NGT/IVF- NGT placed this AM, 200cc bilious output appreciated  - CT chest, abdomen, pelvis with IV contrast  - Continue to monitor respiratory status  - DVT ppx  - encourage OOB

## 2018-12-21 NOTE — PROGRESS NOTE ADULT - SUBJECTIVE AND OBJECTIVE BOX
S: Patient seen and examined.  Started cipro yesterday for +UA.  Refused NGT overnight.  Continues to self suction bilious fluid from her mouth.  Denies current nausea, passing flatus, or having a bowel movement.  Pain controlled.    Vital Signs Last 24 Hrs  T(C): 37.3 (21 Dec 2018 07:20), Max: 38.2 (20 Dec 2018 14:48)  T(F): 99.2 (21 Dec 2018 07:20), Max: 100.8 (20 Dec 2018 14:48)  HR: 103 (21 Dec 2018 07:20) (89 - 104)  BP: 129/61 (21 Dec 2018 07:20) (129/61 - 148/97)  BP(mean): --  RR: 17 (21 Dec 2018 07:20) (17 - 20)  SpO2: 100% (21 Dec 2018 07:20) (98% - 100%)    I&O's Detail    20 Dec 2018 07:01  -  21 Dec 2018 07:00  --------------------------------------------------------  IN:    dextrose 5% + sodium chloride 0.45%.: 630 mL    IV PiggyBack: 300 mL  Total IN: 930 mL    OUT:    Emesis: 900 mL  Total OUT: 900 mL    Total NET: 30 mL      21 Dec 2018 07:01  -  21 Dec 2018 10:34  --------------------------------------------------------  IN:    dextrose 5% + sodium chloride 0.45%.: 120 mL  Total IN: 120 mL    OUT:    Nasoenteral Tube: 200 mL  Total OUT: 200 mL    Total NET: -80 mL          General: alert and oriented, NAD  Resp: airway patent, respirations unlabored  CVS: regular rate and rhythm  Abdomen: soft, nontender, distended, +murky discharge appreciated on abdominal pad from midline incision                                      10.3   7.26  )-----------( 289      ( 21 Dec 2018 06:20 )             33.3     12-21    141  |  98  |  16  ----------------------------<  140<H>  4.2   |  27  |  0.99    Ca    8.8      21 Dec 2018 06:20  Phos  2.5     12-21  Mg     2.1     12-21    TPro  6.3  /  Alb  3.2<L>  /  TBili  < 0.2<L>  /  DBili  x   /  AST  15  /  ALT  11  /  AlkPhos  63  12-21

## 2018-12-21 NOTE — PROGRESS NOTE ADULT - PROBLEM SELECTOR PLAN 1
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 On admission, HbA1c of 7.1 on tresiba 20 units, januvia, and prandin with intermittent episodes of hypoglycemia and post-prandial hyperglycemia. Currently NPO 2/2 SBO s/p ex lap  - FSG now in 140s   - c/w LOW fingerstick and low correctional sliding scale q6 hrs while NPO  - consider increase D5 NS to 50 cc/hr, or to match output from gastric output  - c/w lantus 3 units for now  - d/c likely on prandin, sitagliptin and likely lower dose tresiba pending FSG trends  - will arrange for diabetic educator session to reinforce carb consistent diet Patient with chronic steroid induced T2DM initiated on insulin in 05/2018 for HbaA1c 10s. On admission, HbA1c of 7.1 on tresiba 20 units, januvia, and prandin with intermittent episodes of hypoglycemia and post-prandial hyperglycemia. Currently NPO 2/2 SBO s/p ex lap  - FSG now in 140s   - c/w LOW fingerstick and low correctional sliding scale q6 hrs while NPO  - c/w D5 1/2 NS to 30 cc/hr, or to match output from gastric output  - c/w lantus 3 units for now  - d/c likely on prandin, sitagliptin and likely lower dose tresiba pending FSG trends  - will arrange for diabetic educator session to reinforce carb consistent diet Patient with chronic steroid induced T2DM initiated on insulin in 05/2018 for HbaA1c 10s. On admission, HbA1c of 7.1 on tresiba 20 units, januvia, and prandin with intermittent episodes of hypoglycemia and post-prandial hyperglycemia. Currently NPO 2/2 SBO s/p ex lap  - FSG now in 140s   - c/w LOW fingerstick and low correctional sliding scale q6 hrs while NPO  - c/w D5 1/2 NS @ min 50 cc/hr, or to match output from gastric output  - c/w lantus 3 units for now  - d/c likely on prandin, sitagliptin and likely lower dose tresiba pending FSG trends  - will arrange for diabetic educator session to reinforce carb consistent diet

## 2018-12-21 NOTE — PROGRESS NOTE ADULT - ATTENDING COMMENTS
Bg stable on current insulin. Patient needs more IV fluids given prolonged NPO and NG tube output.  Continue IV levothyroxine can recheck Free T4 in 1 week.

## 2018-12-21 NOTE — PROGRESS NOTE ADULT - PROBLEM SELECTOR PLAN 2
On synthroid 125 mcg last TSH 23 (10/2018), previously improved from 30s in 05/2018. Currently with highly elevated TSH 40s despite adherence at home. Currently not tolerating PO  - plan to increase to PO synthroid 137 mcg daily  - while NPO, synthroid 103 mcg IV qd On synthroid 125 mcg last TSH 23 (10/2018), previously improved from 30s in 05/2018. Currently with highly elevated TSH 40s despite adherence at home. Currently not tolerating PO  - plan to increase to PO synthroid 137 mcg daily  - while NPO, synthroid 103 mcg IV qd (75%)

## 2018-12-22 LAB
-  AMIKACIN: SIGNIFICANT CHANGE UP
-  AMPICILLIN/SULBACTAM: SIGNIFICANT CHANGE UP
-  AMPICILLIN: SIGNIFICANT CHANGE UP
-  AZTREONAM: SIGNIFICANT CHANGE UP
-  CEFAZOLIN: SIGNIFICANT CHANGE UP
-  CEFEPIME: SIGNIFICANT CHANGE UP
-  CEFOXITIN: SIGNIFICANT CHANGE UP
-  CEFTAZIDIME: SIGNIFICANT CHANGE UP
-  CEFTRIAXONE: SIGNIFICANT CHANGE UP
-  CIPROFLOXACIN: SIGNIFICANT CHANGE UP
-  ERTAPENEM: SIGNIFICANT CHANGE UP
-  GENTAMICIN: SIGNIFICANT CHANGE UP
-  IMIPENEM: SIGNIFICANT CHANGE UP
-  LEVOFLOXACIN: SIGNIFICANT CHANGE UP
-  MEROPENEM: SIGNIFICANT CHANGE UP
-  NITROFURANTOIN: SIGNIFICANT CHANGE UP
-  PIPERACILLIN/TAZOBACTAM: SIGNIFICANT CHANGE UP
-  TIGECYCLINE: SIGNIFICANT CHANGE UP
-  TOBRAMYCIN: SIGNIFICANT CHANGE UP
-  TRIMETHOPRIM/SULFAMETHOXAZOLE: SIGNIFICANT CHANGE UP
BACTERIA UR CULT: SIGNIFICANT CHANGE UP
BUN SERPL-MCNC: 14 MG/DL — SIGNIFICANT CHANGE UP (ref 7–23)
CALCIUM SERPL-MCNC: 9.3 MG/DL — SIGNIFICANT CHANGE UP (ref 8.4–10.5)
CHLORIDE SERPL-SCNC: 98 MMOL/L — SIGNIFICANT CHANGE UP (ref 98–107)
CO2 SERPL-SCNC: 30 MMOL/L — SIGNIFICANT CHANGE UP (ref 22–31)
CREAT SERPL-MCNC: 0.98 MG/DL — SIGNIFICANT CHANGE UP (ref 0.5–1.3)
GLUCOSE BLDC GLUCOMTR-MCNC: 166 MG/DL — HIGH (ref 70–99)
GLUCOSE BLDC GLUCOMTR-MCNC: 189 MG/DL — HIGH (ref 70–99)
GLUCOSE BLDC GLUCOMTR-MCNC: 191 MG/DL — HIGH (ref 70–99)
GLUCOSE BLDC GLUCOMTR-MCNC: 197 MG/DL — HIGH (ref 70–99)
GLUCOSE SERPL-MCNC: 184 MG/DL — HIGH (ref 70–99)
HCT VFR BLD CALC: 34.7 % — SIGNIFICANT CHANGE UP (ref 34.5–45)
HGB BLD-MCNC: 10.6 G/DL — LOW (ref 11.5–15.5)
MAGNESIUM SERPL-MCNC: 2.1 MG/DL — SIGNIFICANT CHANGE UP (ref 1.6–2.6)
MCHC RBC-ENTMCNC: 30.5 % — LOW (ref 32–36)
MCHC RBC-ENTMCNC: 31.3 PG — SIGNIFICANT CHANGE UP (ref 27–34)
MCV RBC AUTO: 102.4 FL — HIGH (ref 80–100)
METHOD TYPE: SIGNIFICANT CHANGE UP
NRBC # FLD: 0.05 — SIGNIFICANT CHANGE UP
ORGANISM # SPEC MICROSCOPIC CNT: SIGNIFICANT CHANGE UP
ORGANISM # SPEC MICROSCOPIC CNT: SIGNIFICANT CHANGE UP
PHOSPHATE SERPL-MCNC: 2.4 MG/DL — LOW (ref 2.5–4.5)
PLATELET # BLD AUTO: 354 K/UL — SIGNIFICANT CHANGE UP (ref 150–400)
PMV BLD: 9.8 FL — SIGNIFICANT CHANGE UP (ref 7–13)
POTASSIUM SERPL-MCNC: 4.4 MMOL/L — SIGNIFICANT CHANGE UP (ref 3.5–5.3)
POTASSIUM SERPL-SCNC: 4.4 MMOL/L — SIGNIFICANT CHANGE UP (ref 3.5–5.3)
RBC # BLD: 3.39 M/UL — LOW (ref 3.8–5.2)
RBC # FLD: 14.2 % — SIGNIFICANT CHANGE UP (ref 10.3–14.5)
SODIUM SERPL-SCNC: 142 MMOL/L — SIGNIFICANT CHANGE UP (ref 135–145)
WBC # BLD: 8.11 K/UL — SIGNIFICANT CHANGE UP (ref 3.8–10.5)
WBC # FLD AUTO: 8.11 K/UL — SIGNIFICANT CHANGE UP (ref 3.8–10.5)

## 2018-12-22 PROCEDURE — 99232 SBSQ HOSP IP/OBS MODERATE 35: CPT

## 2018-12-22 RX ORDER — METOPROLOL TARTRATE 50 MG
50 TABLET ORAL DAILY
Qty: 0 | Refills: 0 | Status: DISCONTINUED | OUTPATIENT
Start: 2018-12-22 | End: 2018-12-22

## 2018-12-22 RX ORDER — HYDROMORPHONE HYDROCHLORIDE 2 MG/ML
0.5 INJECTION INTRAMUSCULAR; INTRAVENOUS; SUBCUTANEOUS
Qty: 0 | Refills: 0 | Status: DISCONTINUED | OUTPATIENT
Start: 2018-12-22 | End: 2018-12-24

## 2018-12-22 RX ORDER — INSULIN GLARGINE 100 [IU]/ML
5 INJECTION, SOLUTION SUBCUTANEOUS AT BEDTIME
Qty: 0 | Refills: 0 | Status: DISCONTINUED | OUTPATIENT
Start: 2018-12-22 | End: 2018-12-23

## 2018-12-22 RX ORDER — GABAPENTIN 400 MG/1
400 CAPSULE ORAL THREE TIMES A DAY
Qty: 0 | Refills: 0 | Status: DISCONTINUED | OUTPATIENT
Start: 2018-12-22 | End: 2018-12-23

## 2018-12-22 RX ORDER — HYDROMORPHONE HYDROCHLORIDE 2 MG/ML
1 INJECTION INTRAMUSCULAR; INTRAVENOUS; SUBCUTANEOUS
Qty: 0 | Refills: 0 | Status: DISCONTINUED | OUTPATIENT
Start: 2018-12-22 | End: 2018-12-24

## 2018-12-22 RX ORDER — METOPROLOL TARTRATE 50 MG
5 TABLET ORAL EVERY 6 HOURS
Qty: 0 | Refills: 0 | Status: DISCONTINUED | OUTPATIENT
Start: 2018-12-22 | End: 2018-12-24

## 2018-12-22 RX ORDER — METOPROLOL TARTRATE 50 MG
5 TABLET ORAL EVERY 12 HOURS
Qty: 0 | Refills: 0 | Status: DISCONTINUED | OUTPATIENT
Start: 2018-12-22 | End: 2018-12-22

## 2018-12-22 RX ORDER — LEVOTHYROXINE SODIUM 125 MCG
103 TABLET ORAL AT BEDTIME
Qty: 0 | Refills: 0 | Status: DISCONTINUED | OUTPATIENT
Start: 2018-12-22 | End: 2018-12-24

## 2018-12-22 RX ORDER — BENZOCAINE AND MENTHOL 5; 1 G/100ML; G/100ML
1 LIQUID ORAL EVERY 4 HOURS
Qty: 0 | Refills: 0 | Status: DISCONTINUED | OUTPATIENT
Start: 2018-12-22 | End: 2018-12-30

## 2018-12-22 RX ORDER — LEVOTHYROXINE SODIUM 125 MCG
125 TABLET ORAL DAILY
Qty: 0 | Refills: 0 | Status: DISCONTINUED | OUTPATIENT
Start: 2018-12-22 | End: 2018-12-22

## 2018-12-22 RX ADMIN — Medication 200 MILLIGRAM(S): at 18:43

## 2018-12-22 RX ADMIN — Medication 63.75 MILLIMOLE(S): at 12:06

## 2018-12-22 RX ADMIN — Medication 5 MILLIGRAM(S): at 05:40

## 2018-12-22 RX ADMIN — GABAPENTIN 400 MILLIGRAM(S): 400 CAPSULE ORAL at 15:04

## 2018-12-22 RX ADMIN — HYDROMORPHONE HYDROCHLORIDE 1 MILLIGRAM(S): 2 INJECTION INTRAMUSCULAR; INTRAVENOUS; SUBCUTANEOUS at 18:54

## 2018-12-22 RX ADMIN — BENZOCAINE AND MENTHOL 1 LOZENGE: 5; 1 LIQUID ORAL at 12:06

## 2018-12-22 RX ADMIN — Medication 1 SPRAY(S): at 05:39

## 2018-12-22 RX ADMIN — Medication 1 SPRAY(S): at 05:38

## 2018-12-22 RX ADMIN — HYDROMORPHONE HYDROCHLORIDE 30 MILLILITER(S): 2 INJECTION INTRAMUSCULAR; INTRAVENOUS; SUBCUTANEOUS at 08:18

## 2018-12-22 RX ADMIN — Medication 1: at 06:17

## 2018-12-22 RX ADMIN — GABAPENTIN 400 MILLIGRAM(S): 400 CAPSULE ORAL at 05:38

## 2018-12-22 RX ADMIN — HYDROMORPHONE HYDROCHLORIDE 1 MILLIGRAM(S): 2 INJECTION INTRAMUSCULAR; INTRAVENOUS; SUBCUTANEOUS at 19:14

## 2018-12-22 RX ADMIN — Medication 8 MILLIGRAM(S): at 18:44

## 2018-12-22 RX ADMIN — Medication 1: at 11:49

## 2018-12-22 RX ADMIN — Medication 1 SPRAY(S): at 18:44

## 2018-12-22 RX ADMIN — Medication 1 SPRAY(S): at 01:04

## 2018-12-22 RX ADMIN — BENZOCAINE AND MENTHOL 1 LOZENGE: 5; 1 LIQUID ORAL at 18:55

## 2018-12-22 RX ADMIN — Medication 5 MILLIGRAM(S): at 18:44

## 2018-12-22 RX ADMIN — Medication 8 MILLIGRAM(S): at 05:39

## 2018-12-22 RX ADMIN — HYDROMORPHONE HYDROCHLORIDE 1 MILLIGRAM(S): 2 INJECTION INTRAMUSCULAR; INTRAVENOUS; SUBCUTANEOUS at 15:57

## 2018-12-22 RX ADMIN — HYDROMORPHONE HYDROCHLORIDE 1 MILLIGRAM(S): 2 INJECTION INTRAMUSCULAR; INTRAVENOUS; SUBCUTANEOUS at 17:49

## 2018-12-22 RX ADMIN — SODIUM CHLORIDE 75 MILLILITER(S): 9 INJECTION, SOLUTION INTRAVENOUS at 12:06

## 2018-12-22 RX ADMIN — ONDANSETRON 4 MILLIGRAM(S): 8 TABLET, FILM COATED ORAL at 01:07

## 2018-12-22 RX ADMIN — Medication 1: at 18:44

## 2018-12-22 RX ADMIN — CHLORHEXIDINE GLUCONATE 1 APPLICATION(S): 213 SOLUTION TOPICAL at 11:46

## 2018-12-22 RX ADMIN — Medication 200 MILLIGRAM(S): at 05:50

## 2018-12-22 RX ADMIN — HEPARIN SODIUM 5000 UNIT(S): 5000 INJECTION INTRAVENOUS; SUBCUTANEOUS at 05:39

## 2018-12-22 NOTE — PROGRESS NOTE ADULT - SUBJECTIVE AND OBJECTIVE BOX
Chief Complaint: DM2 on steroids, hypothyroid    History: Remains NPO. Receiving higher rate of dextrose: D51/2NS@75cc/hour.  No hypoglycemia.    MEDICATIONS  (STANDING):  acetaminophen  IVPB .. 1000 milliGRAM(s) IV Intermittent every 6 hours  aspirin Suppository 300 milliGRAM(s) Rectal daily  buDESOnide 160 MICROgram(s)/formoterol 4.5 MICROgram(s) Inhaler 2 Puff(s) Inhalation two times a day  chlorhexidine 4% Liquid 1 Application(s) Topical <User Schedule>  ciprofloxacin   IVPB 400 milliGRAM(s) IV Intermittent every 12 hours  ciprofloxacin   IVPB      dextrose 5% + sodium chloride 0.45%. 1000 milliLiter(s) (75 mL/Hr) IV Continuous <Continuous>  dextrose 50% Injectable 25 milliLiter(s) IV Push once  fluticasone propionate 50 MICROgram(s)/spray Nasal Spray 1 Spray(s) Both Nostrils two times a day  gabapentin   Solution 400 milliGRAM(s) Enteral Tube three times a day  heparin  Injectable 5000 Unit(s) SubCutaneous every 8 hours  influenza   Vaccine 0.5 milliLiter(s) IntraMuscular once  insulin glargine Injectable (LANTUS) 3 Unit(s) SubCutaneous at bedtime  insulin lispro (HumaLOG) corrective regimen sliding scale   SubCutaneous every 6 hours  levothyroxine Injectable 103 MICROGram(s) IV Push at bedtime  methylPREDNISolone sodium succinate Injectable 8 milliGRAM(s) IV Push every 12 hours  metoprolol tartrate Injectable 5 milliGRAM(s) IV Push every 12 hours  tiotropium 18 MICROgram(s) Capsule 1 Capsule(s) Inhalation daily    MEDICATIONS  (PRN):  ALBUTerol    90 MICROgram(s) HFA Inhaler 1 Puff(s) Inhalation every 4 hours PRN Shortness of Breath and/or Wheezing  benzocaine 15 mG/menthol 3.6 mG Lozenge 1 Lozenge Oral every 4 hours PRN Sore Throat  glucagon  Injectable 1 milliGRAM(s) IntraMuscular once PRN Glucose LESS THAN 70 milligrams/deciliter  HYDROmorphone  Injectable 0.5 milliGRAM(s) IV Push every 3 hours PRN Mild Pain (1 - 3) to Moderate Pain  HYDROmorphone  Injectable 1 milliGRAM(s) IV Push every 3 hours PRN Severe Pain (7 - 10)  sodium chloride 0.65% Nasal 1 Spray(s) Both Nostrils every 6 hours PRN Nasal Congestion  tetracaine/benzocaine/butamben Spray 1 Spray(s) Topical every 3 hours PRN sore throat      Allergies    No Known Allergies    Intolerances      Review of Systems:    ALL OTHER SYSTEMS REVIEWED AND NEGATIVE      PHYSICAL EXAM:  VITALS: T(C): 36.7 (12-22-18 @ 14:15)  T(F): 98.1 (12-22-18 @ 14:15), Max: 98.7 (12-22-18 @ 02:00)  HR: 92 (12-22-18 @ 14:15) (92 - 102)  BP: 160/95 (12-22-18 @ 14:15) (132/80 - 160/95)  RR:  (14 - 17)  SpO2:  (99% - 100%)  Wt(kg): --  GENERAL: NAD, well-groomed, well-developed  EYES: No proptosis, no lid lag, anicteric  HEENT:  Atraumatic, Normocephalic, moist mucous membranes  NEURO: extraocular movements intact, no tremor  PSYCH: Alert and oriented x 3, normal affect, normal mood      CAPILLARY BLOOD GLUCOSE      POCT Blood Glucose.: 189 mg/dL (22 Dec 2018 11:44)  POCT Blood Glucose.: 166 mg/dL (22 Dec 2018 05:53)  POCT Blood Glucose.: 191 mg/dL (21 Dec 2018 23:48)  POCT Blood Glucose.: 190 mg/dL (21 Dec 2018 17:52)      12-22    142  |  98  |  14  ----------------------------<  184<H>  4.4   |  30  |  0.98    EGFR if : 72  EGFR if non : 62    Ca    9.3      12-22  Mg     2.1     12-22  Phos  2.4     12-22    TPro  6.3  /  Alb  3.2<L>  /  TBili  < 0.2<L>  /  DBili  x   /  AST  15  /  ALT  11  /  AlkPhos  63  12-21          Thyroid Function Tests:  12-20 @ 06:45 TSH 40.15 FreeT4 0.98 T3 -- Anti TPO -- Anti Thyroglobulin Ab -- TSI --      Hemoglobin A1C, Whole Blood: 7.1 % <H> [4.0 - 5.6] (12-17-18 @ 03:50)  Hemoglobin A1C, Whole Blood: 7.3 % <H> [4.0 - 5.6] (12-16-18 @ 04:17)

## 2018-12-22 NOTE — PROGRESS NOTE ADULT - SUBJECTIVE AND OBJECTIVE BOX
Surgery Progress Note    S: Patient seen and examined. No acute events overnight. Pain well controlled. Denies nausea or vomiting, NGT in place. Antoine in place, low urine output overnight. Denies flatus or BM.    O:  Vital Signs Last 24 Hrs  T(C): 36.9 (22 Dec 2018 11:21), Max: 37.1 (22 Dec 2018 02:00)  T(F): 98.5 (22 Dec 2018 11:21), Max: 98.7 (22 Dec 2018 02:00)  HR: 94 (22 Dec 2018 11:21) (94 - 102)  BP: 149/89 (22 Dec 2018 11:21) (132/80 - 156/93)  BP(mean): --  RR: 14 (22 Dec 2018 11:21) (14 - 17)  SpO2: 100% (22 Dec 2018 11:21) (99% - 100%)    I&O's Detail    21 Dec 2018 07:01  -  22 Dec 2018 07:00  --------------------------------------------------------  IN:    dextrose 5% + sodium chloride 0.45%.: 1440 mL    IV PiggyBack: 350 mL  Total IN: 1790 mL    OUT:    Indwelling Catheter - Urethral: 230 mL    Intermittent Catheterization - Urethral: 380 mL    Nasoenteral Tube: 1000 mL  Total OUT: 1610 mL    Total NET: 180 mL      22 Dec 2018 07:01  -  22 Dec 2018 12:44  --------------------------------------------------------  IN:    dextrose 5% + sodium chloride 0.45%.: 300 mL  Total IN: 300 mL    OUT:    Indwelling Catheter - Urethral: 200 mL    Nasoenteral Tube: 200 mL  Total OUT: 400 mL    Total NET: -100 mL          MEDICATIONS  (STANDING):  acetaminophen  IVPB .. 1000 milliGRAM(s) IV Intermittent every 6 hours  aspirin Suppository 300 milliGRAM(s) Rectal daily  buDESOnide 160 MICROgram(s)/formoterol 4.5 MICROgram(s) Inhaler 2 Puff(s) Inhalation two times a day  chlorhexidine 4% Liquid 1 Application(s) Topical <User Schedule>  ciprofloxacin   IVPB 400 milliGRAM(s) IV Intermittent every 12 hours  ciprofloxacin   IVPB      dextrose 5% + sodium chloride 0.45%. 1000 milliLiter(s) (75 mL/Hr) IV Continuous <Continuous>  dextrose 50% Injectable 25 milliLiter(s) IV Push once  fluticasone propionate 50 MICROgram(s)/spray Nasal Spray 1 Spray(s) Both Nostrils two times a day  gabapentin   Solution 400 milliGRAM(s) Enteral Tube three times a day  heparin  Injectable 5000 Unit(s) SubCutaneous every 8 hours  influenza   Vaccine 0.5 milliLiter(s) IntraMuscular once  insulin glargine Injectable (LANTUS) 3 Unit(s) SubCutaneous at bedtime  insulin lispro (HumaLOG) corrective regimen sliding scale   SubCutaneous every 6 hours  levothyroxine Injectable 103 MICROGram(s) IV Push at bedtime  methylPREDNISolone sodium succinate Injectable 8 milliGRAM(s) IV Push every 12 hours  metoprolol tartrate Injectable 5 milliGRAM(s) IV Push every 12 hours  tiotropium 18 MICROgram(s) Capsule 1 Capsule(s) Inhalation daily    MEDICATIONS  (PRN):  ALBUTerol    90 MICROgram(s) HFA Inhaler 1 Puff(s) Inhalation every 4 hours PRN Shortness of Breath and/or Wheezing  benzocaine 15 mG/menthol 3.6 mG Lozenge 1 Lozenge Oral every 4 hours PRN Sore Throat  glucagon  Injectable 1 milliGRAM(s) IntraMuscular once PRN Glucose LESS THAN 70 milligrams/deciliter  HYDROmorphone  Injectable 0.5 milliGRAM(s) IV Push every 3 hours PRN Mild Pain (1 - 3) to Moderate Pain  HYDROmorphone  Injectable 1 milliGRAM(s) IV Push every 3 hours PRN Severe Pain (7 - 10)  sodium chloride 0.65% Nasal 1 Spray(s) Both Nostrils every 6 hours PRN Nasal Congestion  tetracaine/benzocaine/butamben Spray 1 Spray(s) Topical every 3 hours PRN sore throat                            10.6   8.11  )-----------( 354      ( 22 Dec 2018 05:45 )             34.7       12-22    142  |  98  |  14  ----------------------------<  184<H>  4.4   |  30  |  0.98    Ca    9.3      22 Dec 2018 05:45  Phos  2.4     12-22  Mg     2.1     12-22    TPro  6.3  /  Alb  3.2<L>  /  TBili  < 0.2<L>  /  DBili  x   /  AST  15  /  ALT  11  /  AlkPhos  63  12-21      Physical Exam:  Gen: Laying in bed, NAD  Resp: Unlabored breathing  Abd: soft, NT, mildly distended, incision c/d/i   Ext: WWP  Skin: No rashes

## 2018-12-22 NOTE — PROGRESS NOTE ADULT - PROBLEM SELECTOR PLAN 2
On synthroid 125 mcg last TSH 23 (10/2018), previously improved from 30s in 05/2018. Currently with highly elevated TSH 40s despite adherence at home. Currently not tolerating PO  - plan to increase to PO synthroid 137 mcg daily  - while NPO, synthroid 103 mcg IV qd (75%)  Plan to recheck free T4 next week.

## 2018-12-22 NOTE — PROGRESS NOTE ADULT - PROBLEM SELECTOR PLAN 1
Patient with chronic steroid induced/T2DM initiated on insulin in 05/2018 for HbaA1c 10s. On admission, HbA1c of 7.1 on tresiba 20 units, januvia, and prandin with intermittent episodes of hypoglycemia and post-prandial hyperglycemia. Currently NPO 2/2 SBO s/p ex lap  - FSG now increased 160-190s (likely due to now receiving appropriately increased rate on IV fluids with dextrose).  - c/w LOW correctional scale q6 hrs while NPO  - c/w D5 1/2 NS @75 cc/hour  - Would increase Lantus to 5 units at bedtime  - d/c likely on prandin, sitagliptin and likely lower dose tresiba pending FSG trends

## 2018-12-22 NOTE — PROGRESS NOTE ADULT - PROBLEM SELECTOR PLAN 3
Long standing history chronic oral steroid use.   - c/w solumedrol 8 mg BID (equivalent to double home dose)

## 2018-12-22 NOTE — PROGRESS NOTE ADULT - ASSESSMENT
63F w/ multiple respiratory comorbidities p/w SBO s/p ex-lap with findings of healthy bowel.    - clamp NGT   - Continue to monitor respiratory status  - DVT ppx  - encourage OOB   -monitor UOP     Fatoumata Danielle, PGY-1  [facility] [id number]

## 2018-12-22 NOTE — PROVIDER CONTACT NOTE (OTHER) - SITUATION
Pt complains of difficulty voiding. Pt has urine output of 100 - 200 @4 hours. Multiple attempts at voiding.

## 2018-12-22 NOTE — PROGRESS NOTE ADULT - SUBJECTIVE AND OBJECTIVE BOX
Anesthesia Pain Management Service    SUBJECTIVE: Patient is using IV PCA but states she has back and arthritis pain and sinus pain and no other significant problems reported.    Pain Scale Score	At rest: ___ 	With Activity: ___ 	[X ] Refer to charted pain scores    THERAPY:    [ ] IV PCA Morphine		[ ] 5 mg/mL	[ ] 1 mg/mL  [X ] IV PCA Hydromorphone	[ ] 5 mg/mL	[X ] 1 mg/mL  [ ] IV PCA Fentanyl		[ ] 50 micrograms/mL    Demand dose __0.2_ lockout __6_ (minutes) Continuous Rate _0__ Total: _9.8__   mg used (in past 24 hrs)      MEDICATIONS  (STANDING):  acetaminophen  IVPB .. 1000 milliGRAM(s) IV Intermittent every 6 hours  aspirin Suppository 300 milliGRAM(s) Rectal daily  buDESOnide 160 MICROgram(s)/formoterol 4.5 MICROgram(s) Inhaler 2 Puff(s) Inhalation two times a day  chlorhexidine 4% Liquid 1 Application(s) Topical <User Schedule>  ciprofloxacin   IVPB 400 milliGRAM(s) IV Intermittent every 12 hours  ciprofloxacin   IVPB      dextrose 5% + sodium chloride 0.45%. 1000 milliLiter(s) (75 mL/Hr) IV Continuous <Continuous>  dextrose 50% Injectable 25 milliLiter(s) IV Push once  fluticasone propionate 50 MICROgram(s)/spray Nasal Spray 1 Spray(s) Both Nostrils two times a day  gabapentin   Solution 400 milliGRAM(s) Enteral Tube three times a day  heparin  Injectable 5000 Unit(s) SubCutaneous every 8 hours  influenza   Vaccine 0.5 milliLiter(s) IntraMuscular once  insulin glargine Injectable (LANTUS) 3 Unit(s) SubCutaneous at bedtime  insulin lispro (HumaLOG) corrective regimen sliding scale   SubCutaneous every 6 hours  levothyroxine Injectable 103 MICROGram(s) IV Push at bedtime  methylPREDNISolone sodium succinate Injectable 8 milliGRAM(s) IV Push every 12 hours  metoprolol tartrate Injectable 5 milliGRAM(s) IV Push every 12 hours  tiotropium 18 MICROgram(s) Capsule 1 Capsule(s) Inhalation daily    MEDICATIONS  (PRN):  ALBUTerol    90 MICROgram(s) HFA Inhaler 1 Puff(s) Inhalation every 4 hours PRN Shortness of Breath and/or Wheezing  benzocaine 15 mG/menthol 3.6 mG Lozenge 1 Lozenge Oral every 4 hours PRN Sore Throat  glucagon  Injectable 1 milliGRAM(s) IntraMuscular once PRN Glucose LESS THAN 70 milligrams/deciliter  HYDROmorphone  Injectable 0.5 milliGRAM(s) IV Push every 3 hours PRN Mild Pain (1 - 3) to Moderate Pain  HYDROmorphone  Injectable 1 milliGRAM(s) IV Push every 3 hours PRN Severe Pain (7 - 10)  sodium chloride 0.65% Nasal 1 Spray(s) Both Nostrils every 6 hours PRN Nasal Congestion  tetracaine/benzocaine/butamben Spray 1 Spray(s) Topical every 3 hours PRN sore throat      OBJECTIVE:    Sedation Score:	[ X] Alert	[ ] Drowsy 	[ ] Arousable	[ ] Asleep	[ ] Unresponsive    Side Effects:	[X ] None	[ ] Nausea	[ ] Vomiting	[ ] Pruritus  		[ ] Other:    Vital Signs Last 24 Hrs  T(C): 36.9 (22 Dec 2018 11:21), Max: 37.1 (22 Dec 2018 02:00)  T(F): 98.5 (22 Dec 2018 11:21), Max: 98.7 (22 Dec 2018 02:00)  HR: 94 (22 Dec 2018 11:21) (94 - 102)  BP: 149/89 (22 Dec 2018 11:21) (132/80 - 156/93)  BP(mean): --  RR: 14 (22 Dec 2018 11:21) (14 - 17)  SpO2: 100% (22 Dec 2018 11:21) (99% - 100%)    ASSESSMENT/ PLAN    Therapy to  be:	[ ] Continue   [ X] Discontinued   [X ] Change to prn Analgesics    Documentation and Verification of current medications:   [X] Done	[ ] Not done, not elligible    Comments: PRN Oral/IV opioids and/or Adjuvant non-opioid medication to be ordered at this point as per request of Dr. Grace and pt agrees with plan.    Progress Note written now but Patient was seen earlier.

## 2018-12-23 LAB
BUN SERPL-MCNC: 13 MG/DL — SIGNIFICANT CHANGE UP (ref 7–23)
CALCIUM SERPL-MCNC: 8.9 MG/DL — SIGNIFICANT CHANGE UP (ref 8.4–10.5)
CHLORIDE SERPL-SCNC: 99 MMOL/L — SIGNIFICANT CHANGE UP (ref 98–107)
CO2 SERPL-SCNC: 32 MMOL/L — HIGH (ref 22–31)
CREAT SERPL-MCNC: 0.87 MG/DL — SIGNIFICANT CHANGE UP (ref 0.5–1.3)
GLUCOSE BLDC GLUCOMTR-MCNC: 136 MG/DL — HIGH (ref 70–99)
GLUCOSE BLDC GLUCOMTR-MCNC: 213 MG/DL — HIGH (ref 70–99)
GLUCOSE BLDC GLUCOMTR-MCNC: 216 MG/DL — HIGH (ref 70–99)
GLUCOSE BLDC GLUCOMTR-MCNC: 219 MG/DL — HIGH (ref 70–99)
GLUCOSE BLDC GLUCOMTR-MCNC: 245 MG/DL — HIGH (ref 70–99)
GLUCOSE SERPL-MCNC: 226 MG/DL — HIGH (ref 70–99)
HCT VFR BLD CALC: 32.3 % — LOW (ref 34.5–45)
HGB BLD-MCNC: 10 G/DL — LOW (ref 11.5–15.5)
MAGNESIUM SERPL-MCNC: 2.2 MG/DL — SIGNIFICANT CHANGE UP (ref 1.6–2.6)
MCHC RBC-ENTMCNC: 30.8 PG — SIGNIFICANT CHANGE UP (ref 27–34)
MCHC RBC-ENTMCNC: 31 % — LOW (ref 32–36)
MCV RBC AUTO: 99.4 FL — SIGNIFICANT CHANGE UP (ref 80–100)
NRBC # FLD: 0.03 — SIGNIFICANT CHANGE UP
PHOSPHATE SERPL-MCNC: 2.4 MG/DL — LOW (ref 2.5–4.5)
PLATELET # BLD AUTO: 378 K/UL — SIGNIFICANT CHANGE UP (ref 150–400)
PMV BLD: 9.5 FL — SIGNIFICANT CHANGE UP (ref 7–13)
POTASSIUM SERPL-MCNC: 4 MMOL/L — SIGNIFICANT CHANGE UP (ref 3.5–5.3)
POTASSIUM SERPL-SCNC: 4 MMOL/L — SIGNIFICANT CHANGE UP (ref 3.5–5.3)
RBC # BLD: 3.25 M/UL — LOW (ref 3.8–5.2)
RBC # FLD: 13.9 % — SIGNIFICANT CHANGE UP (ref 10.3–14.5)
SODIUM SERPL-SCNC: 142 MMOL/L — SIGNIFICANT CHANGE UP (ref 135–145)
WBC # BLD: 7.83 K/UL — SIGNIFICANT CHANGE UP (ref 3.8–10.5)
WBC # FLD AUTO: 7.83 K/UL — SIGNIFICANT CHANGE UP (ref 3.8–10.5)

## 2018-12-23 PROCEDURE — 99232 SBSQ HOSP IP/OBS MODERATE 35: CPT

## 2018-12-23 RX ORDER — INSULIN LISPRO 100/ML
VIAL (ML) SUBCUTANEOUS AT BEDTIME
Qty: 0 | Refills: 0 | Status: DISCONTINUED | OUTPATIENT
Start: 2018-12-23 | End: 2018-12-26

## 2018-12-23 RX ORDER — INSULIN LISPRO 100/ML
VIAL (ML) SUBCUTANEOUS
Qty: 0 | Refills: 0 | Status: DISCONTINUED | OUTPATIENT
Start: 2018-12-23 | End: 2018-12-26

## 2018-12-23 RX ORDER — INSULIN GLARGINE 100 [IU]/ML
9 INJECTION, SOLUTION SUBCUTANEOUS AT BEDTIME
Qty: 0 | Refills: 0 | Status: DISCONTINUED | OUTPATIENT
Start: 2018-12-23 | End: 2018-12-26

## 2018-12-23 RX ORDER — PANTOPRAZOLE SODIUM 20 MG/1
40 TABLET, DELAYED RELEASE ORAL DAILY
Qty: 0 | Refills: 0 | Status: DISCONTINUED | OUTPATIENT
Start: 2018-12-23 | End: 2018-12-24

## 2018-12-23 RX ORDER — POTASSIUM PHOSPHATE, MONOBASIC POTASSIUM PHOSPHATE, DIBASIC 236; 224 MG/ML; MG/ML
15 INJECTION, SOLUTION INTRAVENOUS ONCE
Qty: 0 | Refills: 0 | Status: COMPLETED | OUTPATIENT
Start: 2018-12-23 | End: 2018-12-23

## 2018-12-23 RX ORDER — GABAPENTIN 400 MG/1
400 CAPSULE ORAL THREE TIMES A DAY
Qty: 0 | Refills: 0 | Status: DISCONTINUED | OUTPATIENT
Start: 2018-12-23 | End: 2018-12-26

## 2018-12-23 RX ORDER — ACETAMINOPHEN 500 MG
1000 TABLET ORAL EVERY 6 HOURS
Qty: 0 | Refills: 0 | Status: COMPLETED | OUTPATIENT
Start: 2018-12-23 | End: 2018-12-23

## 2018-12-23 RX ADMIN — INSULIN GLARGINE 5 UNIT(S): 100 INJECTION, SOLUTION SUBCUTANEOUS at 02:06

## 2018-12-23 RX ADMIN — HYDROMORPHONE HYDROCHLORIDE 1 MILLIGRAM(S): 2 INJECTION INTRAMUSCULAR; INTRAVENOUS; SUBCUTANEOUS at 00:53

## 2018-12-23 RX ADMIN — HYDROMORPHONE HYDROCHLORIDE 1 MILLIGRAM(S): 2 INJECTION INTRAMUSCULAR; INTRAVENOUS; SUBCUTANEOUS at 07:04

## 2018-12-23 RX ADMIN — SODIUM CHLORIDE 75 MILLILITER(S): 9 INJECTION, SOLUTION INTRAVENOUS at 13:12

## 2018-12-23 RX ADMIN — BENZOCAINE AND MENTHOL 1 LOZENGE: 5; 1 LIQUID ORAL at 17:28

## 2018-12-23 RX ADMIN — HYDROMORPHONE HYDROCHLORIDE 1 MILLIGRAM(S): 2 INJECTION INTRAMUSCULAR; INTRAVENOUS; SUBCUTANEOUS at 23:08

## 2018-12-23 RX ADMIN — GABAPENTIN 400 MILLIGRAM(S): 400 CAPSULE ORAL at 06:46

## 2018-12-23 RX ADMIN — HYDROMORPHONE HYDROCHLORIDE 1 MILLIGRAM(S): 2 INJECTION INTRAMUSCULAR; INTRAVENOUS; SUBCUTANEOUS at 06:34

## 2018-12-23 RX ADMIN — PANTOPRAZOLE SODIUM 40 MILLIGRAM(S): 20 TABLET, DELAYED RELEASE ORAL at 13:03

## 2018-12-23 RX ADMIN — HYDROMORPHONE HYDROCHLORIDE 1 MILLIGRAM(S): 2 INJECTION INTRAMUSCULAR; INTRAVENOUS; SUBCUTANEOUS at 17:04

## 2018-12-23 RX ADMIN — Medication 2: at 02:07

## 2018-12-23 RX ADMIN — Medication 1 SPRAY(S): at 06:35

## 2018-12-23 RX ADMIN — CHLORHEXIDINE GLUCONATE 1 APPLICATION(S): 213 SOLUTION TOPICAL at 10:45

## 2018-12-23 RX ADMIN — HYDROMORPHONE HYDROCHLORIDE 1 MILLIGRAM(S): 2 INJECTION INTRAMUSCULAR; INTRAVENOUS; SUBCUTANEOUS at 22:38

## 2018-12-23 RX ADMIN — Medication 2: at 06:33

## 2018-12-23 RX ADMIN — Medication 5 MILLIGRAM(S): at 02:09

## 2018-12-23 RX ADMIN — Medication 103 MICROGRAM(S): at 02:08

## 2018-12-23 RX ADMIN — Medication 5 MILLIGRAM(S): at 17:28

## 2018-12-23 RX ADMIN — Medication 8 MILLIGRAM(S): at 17:28

## 2018-12-23 RX ADMIN — BENZOCAINE AND MENTHOL 1 LOZENGE: 5; 1 LIQUID ORAL at 01:54

## 2018-12-23 RX ADMIN — HYDROMORPHONE HYDROCHLORIDE 1 MILLIGRAM(S): 2 INJECTION INTRAMUSCULAR; INTRAVENOUS; SUBCUTANEOUS at 00:23

## 2018-12-23 RX ADMIN — Medication 8 MILLIGRAM(S): at 06:35

## 2018-12-23 RX ADMIN — HYDROMORPHONE HYDROCHLORIDE 1 MILLIGRAM(S): 2 INJECTION INTRAMUSCULAR; INTRAVENOUS; SUBCUTANEOUS at 10:45

## 2018-12-23 RX ADMIN — Medication 1000 MILLIGRAM(S): at 22:05

## 2018-12-23 RX ADMIN — POTASSIUM PHOSPHATE, MONOBASIC POTASSIUM PHOSPHATE, DIBASIC 62.5 MILLIMOLE(S): 236; 224 INJECTION, SOLUTION INTRAVENOUS at 13:02

## 2018-12-23 RX ADMIN — Medication 400 MILLIGRAM(S): at 21:35

## 2018-12-23 RX ADMIN — HYDROMORPHONE HYDROCHLORIDE 1 MILLIGRAM(S): 2 INJECTION INTRAMUSCULAR; INTRAVENOUS; SUBCUTANEOUS at 03:31

## 2018-12-23 RX ADMIN — HYDROMORPHONE HYDROCHLORIDE 1 MILLIGRAM(S): 2 INJECTION INTRAMUSCULAR; INTRAVENOUS; SUBCUTANEOUS at 15:14

## 2018-12-23 RX ADMIN — HYDROMORPHONE HYDROCHLORIDE 1 MILLIGRAM(S): 2 INJECTION INTRAMUSCULAR; INTRAVENOUS; SUBCUTANEOUS at 19:32

## 2018-12-23 RX ADMIN — Medication 2: at 12:58

## 2018-12-23 RX ADMIN — Medication 200 MILLIGRAM(S): at 17:29

## 2018-12-23 RX ADMIN — Medication 5 MILLIGRAM(S): at 06:46

## 2018-12-23 RX ADMIN — HEPARIN SODIUM 5000 UNIT(S): 5000 INJECTION INTRAVENOUS; SUBCUTANEOUS at 22:44

## 2018-12-23 RX ADMIN — HYDROMORPHONE HYDROCHLORIDE 1 MILLIGRAM(S): 2 INJECTION INTRAMUSCULAR; INTRAVENOUS; SUBCUTANEOUS at 04:01

## 2018-12-23 RX ADMIN — GABAPENTIN 400 MILLIGRAM(S): 400 CAPSULE ORAL at 02:03

## 2018-12-23 RX ADMIN — Medication 5 MILLIGRAM(S): at 13:03

## 2018-12-23 RX ADMIN — HYDROMORPHONE HYDROCHLORIDE 1 MILLIGRAM(S): 2 INJECTION INTRAMUSCULAR; INTRAVENOUS; SUBCUTANEOUS at 19:48

## 2018-12-23 RX ADMIN — BUDESONIDE AND FORMOTEROL FUMARATE DIHYDRATE 2 PUFF(S): 160; 4.5 AEROSOL RESPIRATORY (INHALATION) at 02:10

## 2018-12-23 RX ADMIN — GABAPENTIN 400 MILLIGRAM(S): 400 CAPSULE ORAL at 15:15

## 2018-12-23 RX ADMIN — BUDESONIDE AND FORMOTEROL FUMARATE DIHYDRATE 2 PUFF(S): 160; 4.5 AEROSOL RESPIRATORY (INHALATION) at 22:40

## 2018-12-23 RX ADMIN — Medication 200 MILLIGRAM(S): at 06:35

## 2018-12-23 RX ADMIN — HYDROMORPHONE HYDROCHLORIDE 1 MILLIGRAM(S): 2 INJECTION INTRAMUSCULAR; INTRAVENOUS; SUBCUTANEOUS at 11:32

## 2018-12-23 RX ADMIN — GABAPENTIN 400 MILLIGRAM(S): 400 CAPSULE ORAL at 22:44

## 2018-12-23 RX ADMIN — INSULIN GLARGINE 9 UNIT(S): 100 INJECTION, SOLUTION SUBCUTANEOUS at 22:46

## 2018-12-23 RX ADMIN — Medication 103 MICROGRAM(S): at 22:38

## 2018-12-23 RX ADMIN — HEPARIN SODIUM 5000 UNIT(S): 5000 INJECTION INTRAVENOUS; SUBCUTANEOUS at 06:46

## 2018-12-23 NOTE — PROGRESS NOTE ADULT - PROBLEM SELECTOR PLAN 1
Patient with chronic steroid induced/T2DM initiated on insulin in 05/2018 for HbaA1c 10s. On admission, HbA1c of 7.1 on tresiba 20 units, januvia, and prandin with intermittent episodes of hypoglycemia and post-prandial hyperglycemia. Currently has been NPO but diet to be advanced to clears.    - c/w D5 1/2 NS @75 cc/hour. But once tolerating clears can stop IV fluids with dextrose.  - Would increase Lantus to 9 units at bedtime  -Change correction scale to moderate before meals and moderate bedtime.  - d/c likely on prandin, sitagliptin and likely lower dose tresiba pending FSG trends

## 2018-12-23 NOTE — PROGRESS NOTE ADULT - SUBJECTIVE AND OBJECTIVE BOX
S: Patient seen and examined.  No acute events overnight.  Pain controlled.  Had iraheta removed, passed trial of void.  Has had NGT clamped without any nausea or vomiting.    O: Vital Signs  T(C): 36.9 (12-23 @ 10:06), Max: 37.4 (12-22 @ 22:00)  HR: 72 (12-23 @ 10:06) (72 - 92)  BP: 159/91 (12-23 @ 10:06) (135/80 - 190/0)  RR: 18 (12-23 @ 10:06) (14 - 18)  SpO2: 100% (12-23 @ 10:06) (95% - 100%)  12-22-18 @ 07:01  -  12-23-18 @ 07:00  --------------------------------------------------------  IN: 2050 mL / OUT: 1300 mL / NET: 750 mL    12-23-18 @ 07:01  -  12-23-18 @ 12:17  --------------------------------------------------------  IN: 300 mL / OUT: 0 mL / NET: 300 mL      Physical Exam:  Gen: Laying in bed, NAD, NGT clamped  Resp: Unlabored breathing  Abd: soft, NT, mildly distended, incision c/d/i   Ext: WWP  Skin: No rashes                        10.0   7.83  )-----------( 378      ( 23 Dec 2018 06:00 )             32.3   12-23    142  |  99  |  13  ----------------------------<  226<H>  4.0   |  32<H>  |  0.87    Ca    8.9      23 Dec 2018 06:00  Phos  2.4     12-23  Mg     2.2     12-23

## 2018-12-23 NOTE — PROGRESS NOTE ADULT - ASSESSMENT
63F w/ multiple respiratory comorbidities p/w SBO s/p ex-lap with findings of healthy bowel.    - continue to clamp NGT   - Continue to monitor respiratory status in the setting of holding lasix  - DVT ppx  - encourage OOB   - monitor UOP

## 2018-12-23 NOTE — PROGRESS NOTE ADULT - PROBLEM SELECTOR PLAN 2
On synthroid 125 mcg last TSH 23 (10/2018), previously improved from 30s in 05/2018. Currently with highly elevated TSH 40s despite adherence at home. Currently not tolerating PO  - plan to increase to PO synthroid 137 mcg daily  - while NPO, synthroid 103 mcg IV qd (75%)  Plan to recheck free T4 later this week.

## 2018-12-23 NOTE — PROGRESS NOTE ADULT - SUBJECTIVE AND OBJECTIVE BOX
Chief Complaint: DM2    History: NG tube is out. Patient states diet may be advanced to clears later today. Thus far NPO on D51/2NS@ 75 cc/hour.    MEDICATIONS  (STANDING):  acetaminophen  IVPB .. 1000 milliGRAM(s) IV Intermittent every 6 hours  aspirin Suppository 300 milliGRAM(s) Rectal daily  buDESOnide 160 MICROgram(s)/formoterol 4.5 MICROgram(s) Inhaler 2 Puff(s) Inhalation two times a day  chlorhexidine 4% Liquid 1 Application(s) Topical <User Schedule>  ciprofloxacin   IVPB 400 milliGRAM(s) IV Intermittent every 12 hours  ciprofloxacin   IVPB      dextrose 5% + sodium chloride 0.45%. 1000 milliLiter(s) (75 mL/Hr) IV Continuous <Continuous>  dextrose 50% Injectable 25 milliLiter(s) IV Push once  fluticasone propionate 50 MICROgram(s)/spray Nasal Spray 1 Spray(s) Both Nostrils two times a day  gabapentin   Solution 400 milliGRAM(s) Enteral Tube three times a day  heparin  Injectable 5000 Unit(s) SubCutaneous every 8 hours  influenza   Vaccine 0.5 milliLiter(s) IntraMuscular once  insulin glargine Injectable (LANTUS) 5 Unit(s) SubCutaneous at bedtime  insulin lispro (HumaLOG) corrective regimen sliding scale   SubCutaneous every 6 hours  levothyroxine Injectable 103 MICROGram(s) IV Push at bedtime  methylPREDNISolone sodium succinate Injectable 8 milliGRAM(s) IV Push every 12 hours  metoprolol tartrate Injectable 5 milliGRAM(s) IV Push every 6 hours  pantoprazole  Injectable 40 milliGRAM(s) IV Push daily  tiotropium 18 MICROgram(s) Capsule 1 Capsule(s) Inhalation daily    MEDICATIONS  (PRN):  ALBUTerol    90 MICROgram(s) HFA Inhaler 1 Puff(s) Inhalation every 4 hours PRN Shortness of Breath and/or Wheezing  benzocaine 15 mG/menthol 3.6 mG Lozenge 1 Lozenge Oral every 4 hours PRN Sore Throat  glucagon  Injectable 1 milliGRAM(s) IntraMuscular once PRN Glucose LESS THAN 70 milligrams/deciliter  HYDROmorphone  Injectable 0.5 milliGRAM(s) IV Push every 3 hours PRN Mild Pain (1 - 3) to Moderate Pain  HYDROmorphone  Injectable 1 milliGRAM(s) IV Push every 3 hours PRN Severe Pain (7 - 10)  sodium chloride 0.65% Nasal 1 Spray(s) Both Nostrils every 6 hours PRN Nasal Congestion  tetracaine/benzocaine/butamben Spray 1 Spray(s) Topical every 3 hours PRN sore throat      Allergies    No Known Allergies    Intolerances      Review of Systems:    ALL OTHER SYSTEMS REVIEWED AND NEGATIVE      PHYSICAL EXAM:  VITALS: T(C): 36.8 (12-23-18 @ 13:21)  T(F): 98.3 (12-23-18 @ 13:21), Max: 99.3 (12-22-18 @ 22:00)  HR: 79 (12-23-18 @ 15:13) (72 - 90)  BP: 151/96 (12-23-18 @ 15:13) (135/80 - 190/0)  RR:  (14 - 18)  SpO2:  (95% - 100%)  Wt(kg): --  GENERAL: NAD, well-groomed, well-developed  EYES: No proptosis, no lid lag, anicteric  HEENT:  Atraumatic, Normocephalic, moist mucous membranes  NEURO: extraocular movements intact, no tremor  PSYCH: Alert and oriented x 3, normal affect, normal mood      CAPILLARY BLOOD GLUCOSE      POCT Blood Glucose.: 213 mg/dL (23 Dec 2018 11:55)  POCT Blood Glucose.: 216 mg/dL (23 Dec 2018 06:03)  POCT Blood Glucose.: 245 mg/dL (23 Dec 2018 01:31)  POCT Blood Glucose.: 197 mg/dL (22 Dec 2018 18:25)      12-23    142  |  99  |  13  ----------------------------<  226<H>  4.0   |  32<H>  |  0.87    EGFR if : 83  EGFR if non : 72    Ca    8.9      12-23  Mg     2.2     12-23  Phos  2.4     12-23    TPro  6.3  /  Alb  3.2<L>  /  TBili  < 0.2<L>  /  DBili  x   /  AST  15  /  ALT  11  /  AlkPhos  63  12-21          Thyroid Function Tests:  12-20 @ 06:45 TSH 40.15 FreeT4 0.98 T3 -- Anti TPO -- Anti Thyroglobulin Ab -- TSI --      Hemoglobin A1C, Whole Blood: 7.1 % <H> [4.0 - 5.6] (12-17-18 @ 03:50)  Hemoglobin A1C, Whole Blood: 7.3 % <H> [4.0 - 5.6] (12-16-18 @ 04:17)

## 2018-12-24 LAB
BUN SERPL-MCNC: 16 MG/DL — SIGNIFICANT CHANGE UP (ref 7–23)
CALCIUM SERPL-MCNC: 8.7 MG/DL — SIGNIFICANT CHANGE UP (ref 8.4–10.5)
CHLORIDE SERPL-SCNC: 97 MMOL/L — LOW (ref 98–107)
CO2 SERPL-SCNC: 30 MMOL/L — SIGNIFICANT CHANGE UP (ref 22–31)
CREAT SERPL-MCNC: 1.04 MG/DL — SIGNIFICANT CHANGE UP (ref 0.5–1.3)
GLUCOSE BLDC GLUCOMTR-MCNC: 108 MG/DL — HIGH (ref 70–99)
GLUCOSE BLDC GLUCOMTR-MCNC: 131 MG/DL — HIGH (ref 70–99)
GLUCOSE BLDC GLUCOMTR-MCNC: 170 MG/DL — HIGH (ref 70–99)
GLUCOSE BLDC GLUCOMTR-MCNC: 180 MG/DL — HIGH (ref 70–99)
GLUCOSE SERPL-MCNC: 180 MG/DL — HIGH (ref 70–99)
HCT VFR BLD CALC: 33.4 % — LOW (ref 34.5–45)
HGB BLD-MCNC: 10.2 G/DL — LOW (ref 11.5–15.5)
MAGNESIUM SERPL-MCNC: 2.1 MG/DL — SIGNIFICANT CHANGE UP (ref 1.6–2.6)
MCHC RBC-ENTMCNC: 30.5 % — LOW (ref 32–36)
MCHC RBC-ENTMCNC: 31.4 PG — SIGNIFICANT CHANGE UP (ref 27–34)
MCV RBC AUTO: 102.8 FL — HIGH (ref 80–100)
NRBC # FLD: 0.02 — SIGNIFICANT CHANGE UP
PHOSPHATE SERPL-MCNC: 3 MG/DL — SIGNIFICANT CHANGE UP (ref 2.5–4.5)
PLATELET # BLD AUTO: 400 K/UL — SIGNIFICANT CHANGE UP (ref 150–400)
PMV BLD: 9.3 FL — SIGNIFICANT CHANGE UP (ref 7–13)
POTASSIUM SERPL-MCNC: 4.5 MMOL/L — SIGNIFICANT CHANGE UP (ref 3.5–5.3)
POTASSIUM SERPL-SCNC: 4.5 MMOL/L — SIGNIFICANT CHANGE UP (ref 3.5–5.3)
RBC # BLD: 3.25 M/UL — LOW (ref 3.8–5.2)
RBC # FLD: 14 % — SIGNIFICANT CHANGE UP (ref 10.3–14.5)
SODIUM SERPL-SCNC: 139 MMOL/L — SIGNIFICANT CHANGE UP (ref 135–145)
WBC # BLD: 7.8 K/UL — SIGNIFICANT CHANGE UP (ref 3.8–10.5)
WBC # FLD AUTO: 7.8 K/UL — SIGNIFICANT CHANGE UP (ref 3.8–10.5)

## 2018-12-24 PROCEDURE — 99232 SBSQ HOSP IP/OBS MODERATE 35: CPT

## 2018-12-24 RX ORDER — INSULIN LISPRO 100/ML
2 VIAL (ML) SUBCUTANEOUS
Qty: 0 | Refills: 0 | Status: DISCONTINUED | OUTPATIENT
Start: 2018-12-24 | End: 2018-12-26

## 2018-12-24 RX ORDER — ACETAMINOPHEN 500 MG
1000 TABLET ORAL EVERY 6 HOURS
Qty: 0 | Refills: 0 | Status: COMPLETED | OUTPATIENT
Start: 2018-12-24 | End: 2018-12-24

## 2018-12-24 RX ORDER — OXYBUTYNIN CHLORIDE 5 MG
5 TABLET ORAL
Qty: 0 | Refills: 0 | Status: DISCONTINUED | OUTPATIENT
Start: 2018-12-24 | End: 2018-12-26

## 2018-12-24 RX ORDER — OXYCODONE HYDROCHLORIDE 5 MG/1
10 TABLET ORAL EVERY 4 HOURS
Qty: 0 | Refills: 0 | Status: DISCONTINUED | OUTPATIENT
Start: 2018-12-24 | End: 2018-12-26

## 2018-12-24 RX ORDER — TRAMADOL HYDROCHLORIDE 50 MG/1
75 TABLET ORAL THREE TIMES A DAY
Qty: 0 | Refills: 0 | Status: DISCONTINUED | OUTPATIENT
Start: 2018-12-24 | End: 2018-12-24

## 2018-12-24 RX ORDER — LORATADINE 10 MG/1
10 TABLET ORAL DAILY
Qty: 0 | Refills: 0 | Status: DISCONTINUED | OUTPATIENT
Start: 2018-12-24 | End: 2018-12-26

## 2018-12-24 RX ORDER — OXYCODONE HYDROCHLORIDE 5 MG/1
5 TABLET ORAL EVERY 4 HOURS
Qty: 0 | Refills: 0 | Status: DISCONTINUED | OUTPATIENT
Start: 2018-12-24 | End: 2018-12-26

## 2018-12-24 RX ORDER — DOCUSATE SODIUM 100 MG
100 CAPSULE ORAL THREE TIMES A DAY
Qty: 0 | Refills: 0 | Status: DISCONTINUED | OUTPATIENT
Start: 2018-12-24 | End: 2018-12-24

## 2018-12-24 RX ORDER — METOPROLOL TARTRATE 50 MG
50 TABLET ORAL DAILY
Qty: 0 | Refills: 0 | Status: DISCONTINUED | OUTPATIENT
Start: 2018-12-24 | End: 2018-12-26

## 2018-12-24 RX ORDER — SIMVASTATIN 20 MG/1
20 TABLET, FILM COATED ORAL AT BEDTIME
Qty: 0 | Refills: 0 | Status: DISCONTINUED | OUTPATIENT
Start: 2018-12-24 | End: 2018-12-26

## 2018-12-24 RX ORDER — CLOPIDOGREL BISULFATE 75 MG/1
75 TABLET, FILM COATED ORAL DAILY
Qty: 0 | Refills: 0 | Status: DISCONTINUED | OUTPATIENT
Start: 2018-12-24 | End: 2018-12-30

## 2018-12-24 RX ORDER — ACETAMINOPHEN 500 MG
1000 TABLET ORAL ONCE
Qty: 0 | Refills: 0 | Status: COMPLETED | OUTPATIENT
Start: 2018-12-24 | End: 2018-12-24

## 2018-12-24 RX ORDER — DOCUSATE SODIUM 100 MG
100 CAPSULE ORAL THREE TIMES A DAY
Qty: 0 | Refills: 0 | Status: DISCONTINUED | OUTPATIENT
Start: 2018-12-24 | End: 2018-12-26

## 2018-12-24 RX ORDER — PANTOPRAZOLE SODIUM 20 MG/1
40 TABLET, DELAYED RELEASE ORAL
Qty: 0 | Refills: 0 | Status: DISCONTINUED | OUTPATIENT
Start: 2018-12-24 | End: 2018-12-26

## 2018-12-24 RX ORDER — SULFASALAZINE 500 MG
500 TABLET ORAL
Qty: 0 | Refills: 0 | Status: DISCONTINUED | OUTPATIENT
Start: 2018-12-24 | End: 2018-12-26

## 2018-12-24 RX ORDER — NITROFURANTOIN MACROCRYSTAL 50 MG
100 CAPSULE ORAL
Qty: 0 | Refills: 0 | Status: DISCONTINUED | OUTPATIENT
Start: 2018-12-24 | End: 2018-12-26

## 2018-12-24 RX ORDER — TRAMADOL HYDROCHLORIDE 50 MG/1
75 TABLET ORAL EVERY 6 HOURS
Qty: 0 | Refills: 0 | Status: DISCONTINUED | OUTPATIENT
Start: 2018-12-24 | End: 2018-12-26

## 2018-12-24 RX ORDER — SENNA PLUS 8.6 MG/1
2 TABLET ORAL AT BEDTIME
Qty: 0 | Refills: 0 | Status: DISCONTINUED | OUTPATIENT
Start: 2018-12-24 | End: 2018-12-26

## 2018-12-24 RX ORDER — FAMOTIDINE 10 MG/ML
40 INJECTION INTRAVENOUS DAILY
Qty: 0 | Refills: 0 | Status: DISCONTINUED | OUTPATIENT
Start: 2018-12-24 | End: 2018-12-26

## 2018-12-24 RX ORDER — AMLODIPINE BESYLATE 2.5 MG/1
5 TABLET ORAL DAILY
Qty: 0 | Refills: 0 | Status: DISCONTINUED | OUTPATIENT
Start: 2018-12-24 | End: 2018-12-26

## 2018-12-24 RX ORDER — PANTOPRAZOLE SODIUM 20 MG/1
40 TABLET, DELAYED RELEASE ORAL
Qty: 0 | Refills: 0 | Status: DISCONTINUED | OUTPATIENT
Start: 2018-12-24 | End: 2018-12-24

## 2018-12-24 RX ORDER — SENNA PLUS 8.6 MG/1
2 TABLET ORAL AT BEDTIME
Qty: 0 | Refills: 0 | Status: DISCONTINUED | OUTPATIENT
Start: 2018-12-24 | End: 2018-12-24

## 2018-12-24 RX ORDER — LEVOTHYROXINE SODIUM 125 MCG
137 TABLET ORAL DAILY
Qty: 0 | Refills: 0 | Status: DISCONTINUED | OUTPATIENT
Start: 2018-12-24 | End: 2018-12-26

## 2018-12-24 RX ADMIN — BUDESONIDE AND FORMOTEROL FUMARATE DIHYDRATE 2 PUFF(S): 160; 4.5 AEROSOL RESPIRATORY (INHALATION) at 23:46

## 2018-12-24 RX ADMIN — Medication 2: at 08:36

## 2018-12-24 RX ADMIN — AMLODIPINE BESYLATE 5 MILLIGRAM(S): 2.5 TABLET ORAL at 12:54

## 2018-12-24 RX ADMIN — OXYCODONE HYDROCHLORIDE 10 MILLIGRAM(S): 5 TABLET ORAL at 23:46

## 2018-12-24 RX ADMIN — Medication 100 MILLIGRAM(S): at 18:31

## 2018-12-24 RX ADMIN — GABAPENTIN 400 MILLIGRAM(S): 400 CAPSULE ORAL at 21:51

## 2018-12-24 RX ADMIN — GABAPENTIN 400 MILLIGRAM(S): 400 CAPSULE ORAL at 13:38

## 2018-12-24 RX ADMIN — INSULIN GLARGINE 9 UNIT(S): 100 INJECTION, SOLUTION SUBCUTANEOUS at 23:47

## 2018-12-24 RX ADMIN — OXYCODONE HYDROCHLORIDE 10 MILLIGRAM(S): 5 TABLET ORAL at 18:28

## 2018-12-24 RX ADMIN — Medication 15 MILLIGRAM(S): at 18:32

## 2018-12-24 RX ADMIN — Medication 5 MILLIGRAM(S): at 18:31

## 2018-12-24 RX ADMIN — Medication 100 MILLIGRAM(S): at 13:41

## 2018-12-24 RX ADMIN — OXYCODONE HYDROCHLORIDE 10 MILLIGRAM(S): 5 TABLET ORAL at 18:45

## 2018-12-24 RX ADMIN — OXYCODONE HYDROCHLORIDE 10 MILLIGRAM(S): 5 TABLET ORAL at 12:06

## 2018-12-24 RX ADMIN — HYDROMORPHONE HYDROCHLORIDE 1 MILLIGRAM(S): 2 INJECTION INTRAMUSCULAR; INTRAVENOUS; SUBCUTANEOUS at 02:30

## 2018-12-24 RX ADMIN — CLOPIDOGREL BISULFATE 75 MILLIGRAM(S): 75 TABLET, FILM COATED ORAL at 12:52

## 2018-12-24 RX ADMIN — HYDROMORPHONE HYDROCHLORIDE 1 MILLIGRAM(S): 2 INJECTION INTRAMUSCULAR; INTRAVENOUS; SUBCUTANEOUS at 02:15

## 2018-12-24 RX ADMIN — FAMOTIDINE 40 MILLIGRAM(S): 10 INJECTION INTRAVENOUS at 18:32

## 2018-12-24 RX ADMIN — SIMVASTATIN 20 MILLIGRAM(S): 20 TABLET, FILM COATED ORAL at 21:51

## 2018-12-24 RX ADMIN — PANTOPRAZOLE SODIUM 40 MILLIGRAM(S): 20 TABLET, DELAYED RELEASE ORAL at 21:51

## 2018-12-24 RX ADMIN — TRAMADOL HYDROCHLORIDE 75 MILLIGRAM(S): 50 TABLET ORAL at 13:39

## 2018-12-24 RX ADMIN — LORATADINE 10 MILLIGRAM(S): 10 TABLET ORAL at 18:30

## 2018-12-24 RX ADMIN — Medication 1000 MILLIGRAM(S): at 20:10

## 2018-12-24 RX ADMIN — Medication 5 MILLIGRAM(S): at 00:09

## 2018-12-24 RX ADMIN — Medication 8 MILLIGRAM(S): at 05:19

## 2018-12-24 RX ADMIN — Medication 400 MILLIGRAM(S): at 19:55

## 2018-12-24 RX ADMIN — Medication 200 MILLIGRAM(S): at 05:36

## 2018-12-24 RX ADMIN — HEPARIN SODIUM 5000 UNIT(S): 5000 INJECTION INTRAVENOUS; SUBCUTANEOUS at 21:51

## 2018-12-24 RX ADMIN — TRAMADOL HYDROCHLORIDE 75 MILLIGRAM(S): 50 TABLET ORAL at 21:50

## 2018-12-24 RX ADMIN — HEPARIN SODIUM 5000 UNIT(S): 5000 INJECTION INTRAVENOUS; SUBCUTANEOUS at 13:39

## 2018-12-24 RX ADMIN — Medication 50 MILLIGRAM(S): at 12:54

## 2018-12-24 RX ADMIN — Medication 400 MILLIGRAM(S): at 05:35

## 2018-12-24 RX ADMIN — Medication 2 UNIT(S): at 18:28

## 2018-12-24 RX ADMIN — HYDROMORPHONE HYDROCHLORIDE 1 MILLIGRAM(S): 2 INJECTION INTRAMUSCULAR; INTRAVENOUS; SUBCUTANEOUS at 05:17

## 2018-12-24 RX ADMIN — Medication 1000 MILLIGRAM(S): at 06:05

## 2018-12-24 RX ADMIN — OXYCODONE HYDROCHLORIDE 10 MILLIGRAM(S): 5 TABLET ORAL at 12:21

## 2018-12-24 RX ADMIN — CHLORHEXIDINE GLUCONATE 1 APPLICATION(S): 213 SOLUTION TOPICAL at 10:24

## 2018-12-24 RX ADMIN — Medication 5 MILLIGRAM(S): at 05:19

## 2018-12-24 RX ADMIN — Medication 500 MILLIGRAM(S): at 18:30

## 2018-12-24 RX ADMIN — HYDROMORPHONE HYDROCHLORIDE 1 MILLIGRAM(S): 2 INJECTION INTRAMUSCULAR; INTRAVENOUS; SUBCUTANEOUS at 09:20

## 2018-12-24 RX ADMIN — HYDROMORPHONE HYDROCHLORIDE 1 MILLIGRAM(S): 2 INJECTION INTRAMUSCULAR; INTRAVENOUS; SUBCUTANEOUS at 05:32

## 2018-12-24 RX ADMIN — PANTOPRAZOLE SODIUM 40 MILLIGRAM(S): 20 TABLET, DELAYED RELEASE ORAL at 12:52

## 2018-12-24 RX ADMIN — Medication 2: at 12:51

## 2018-12-24 RX ADMIN — TRAMADOL HYDROCHLORIDE 75 MILLIGRAM(S): 50 TABLET ORAL at 13:55

## 2018-12-24 RX ADMIN — TIOTROPIUM BROMIDE 1 CAPSULE(S): 18 CAPSULE ORAL; RESPIRATORY (INHALATION) at 13:40

## 2018-12-24 RX ADMIN — GABAPENTIN 400 MILLIGRAM(S): 400 CAPSULE ORAL at 05:19

## 2018-12-24 RX ADMIN — BUDESONIDE AND FORMOTEROL FUMARATE DIHYDRATE 2 PUFF(S): 160; 4.5 AEROSOL RESPIRATORY (INHALATION) at 12:53

## 2018-12-24 RX ADMIN — HYDROMORPHONE HYDROCHLORIDE 1 MILLIGRAM(S): 2 INJECTION INTRAMUSCULAR; INTRAVENOUS; SUBCUTANEOUS at 09:35

## 2018-12-24 RX ADMIN — TRAMADOL HYDROCHLORIDE 75 MILLIGRAM(S): 50 TABLET ORAL at 22:20

## 2018-12-24 NOTE — DIETITIAN INITIAL EVALUATION ADULT. - PERTINENT MEDS FT
MEDICATIONS  (STANDING):  amLODIPine   Tablet 5 milliGRAM(s) Oral daily  buDESOnide 160 MICROgram(s)/formoterol 4.5 MICROgram(s) Inhaler 2 Puff(s) Inhalation two times a day  chlorhexidine 4% Liquid 1 Application(s) Topical <User Schedule>  clopidogrel Tablet 75 milliGRAM(s) Oral daily  dextrose 50% Injectable 25 milliLiter(s) IV Push once  docusate sodium 100 milliGRAM(s) Oral three times a day  famotidine    Tablet 40 milliGRAM(s) Oral daily  fluticasone propionate 50 MICROgram(s)/spray Nasal Spray 1 Spray(s) Both Nostrils two times a day  gabapentin 400 milliGRAM(s) Oral three times a day  heparin  Injectable 5000 Unit(s) SubCutaneous every 8 hours  influenza   Vaccine 0.5 milliLiter(s) IntraMuscular once  insulin glargine Injectable (LANTUS) 9 Unit(s) SubCutaneous at bedtime  insulin lispro (HumaLOG) corrective regimen sliding scale   SubCutaneous three times a day before meals  insulin lispro (HumaLOG) corrective regimen sliding scale   SubCutaneous at bedtime  insulin lispro Injectable (HumaLOG) 2 Unit(s) SubCutaneous three times a day before meals  levothyroxine 137 MICROGram(s) Oral daily  loratadine 10 milliGRAM(s) Oral daily  metoprolol succinate ER 50 milliGRAM(s) Oral daily  nitrofurantoin monohydrate/macrocrystals (MACROBID) 100 milliGRAM(s) Oral two times a day with meals  oxybutynin 5 milliGRAM(s) Oral two times a day  predniSONE   Tablet 15 milliGRAM(s) Oral daily  senna 2 Tablet(s) Oral at bedtime  simvastatin 20 milliGRAM(s) Oral at bedtime  sulfaSALAzine 500 milliGRAM(s) Oral two times a day  tiotropium 18 MICROgram(s) Capsule 1 Capsule(s) Inhalation daily  traMADol 75 milliGRAM(s) Oral three times a day    MEDICATIONS  (PRN):  ALBUTerol    90 MICROgram(s) HFA Inhaler 1 Puff(s) Inhalation every 4 hours PRN Shortness of Breath and/or Wheezing  benzocaine 15 mG/menthol 3.6 mG Lozenge 1 Lozenge Oral every 4 hours PRN Sore Throat  glucagon  Injectable 1 milliGRAM(s) IntraMuscular once PRN Glucose LESS THAN 70 milligrams/deciliter  oxyCODONE    IR 5 milliGRAM(s) Oral every 4 hours PRN Moderate Pain (4 - 6)  oxyCODONE    IR 10 milliGRAM(s) Oral every 4 hours PRN Severe Pain (7 - 10)  sodium chloride 0.65% Nasal 1 Spray(s) Both Nostrils every 6 hours PRN Nasal Congestion  tetracaine/benzocaine/butamben Spray 1 Spray(s) Topical every 3 hours PRN sore throat

## 2018-12-24 NOTE — DIETITIAN INITIAL EVALUATION ADULT. - OTHER INFO
Initial Dietitian Evaluation 2/2 to extended length of stay. 64 y/o Female admitted for SBO. Patient was NPO (12/15-23) and started on clear liquid diet (12/23-24), tolerating well. Consuming 100%. Patient denies any nausea/vomiting/diarrhea/constipation or difficulty chewing and swallowing. NKFA. Therapeutic diet recommendations reviewed with patient.

## 2018-12-24 NOTE — PROGRESS NOTE ADULT - ASSESSMENT
63 F with PMH of COPD (on home O2 (3L), hypothyroidism, T2DM (HbA1c 8s 11/2018, on insulin), RA on prednisone (10 mg daily x 30 years), HTN,  admitted for SBO s/p ex lap with lysis of adhesions, complicated by poor glycemic control with periods of hyperglycemia and hypoglycemia on home regimen as well as inpatient with no oral intake in setting of SBO.

## 2018-12-24 NOTE — DIETITIAN INITIAL EVALUATION ADULT. - ENERGY NEEDS
Weight 12/1389=544.5lbs Ht 5'1" BMI= 47.9kg/m2  IBW: 105lbs (+/-10%) %%  +2 generalized edema. No pressure injury. Surgical incision.

## 2018-12-24 NOTE — PROGRESS NOTE ADULT - SUBJECTIVE AND OBJECTIVE BOX
Chief Complaint: DM2, hypothyroid    History: Tolerated clear liquids. To be advanced to regular diet.  Now off IV fluids with dextrose.    MEDICATIONS  (STANDING):  amLODIPine   Tablet 5 milliGRAM(s) Oral daily  buDESOnide 160 MICROgram(s)/formoterol 4.5 MICROgram(s) Inhaler 2 Puff(s) Inhalation two times a day  chlorhexidine 4% Liquid 1 Application(s) Topical <User Schedule>  clopidogrel Tablet 75 milliGRAM(s) Oral daily  dextrose 50% Injectable 25 milliLiter(s) IV Push once  docusate sodium 100 milliGRAM(s) Oral three times a day  fluticasone propionate 50 MICROgram(s)/spray Nasal Spray 1 Spray(s) Both Nostrils two times a day  gabapentin 400 milliGRAM(s) Oral three times a day  heparin  Injectable 5000 Unit(s) SubCutaneous every 8 hours  influenza   Vaccine 0.5 milliLiter(s) IntraMuscular once  insulin glargine Injectable (LANTUS) 9 Unit(s) SubCutaneous at bedtime  insulin lispro (HumaLOG) corrective regimen sliding scale   SubCutaneous three times a day before meals  insulin lispro (HumaLOG) corrective regimen sliding scale   SubCutaneous at bedtime  levothyroxine 137 MICROGram(s) Oral daily  loratadine 10 milliGRAM(s) Oral daily  methylPREDNISolone sodium succinate Injectable 8 milliGRAM(s) IV Push every 12 hours  metoprolol succinate ER 50 milliGRAM(s) Oral daily  nitrofurantoin monohydrate/macrocrystals (MACROBID) 100 milliGRAM(s) Oral two times a day with meals  pantoprazole    Tablet 40 milliGRAM(s) Oral before breakfast  senna 2 Tablet(s) Oral at bedtime  simvastatin 20 milliGRAM(s) Oral at bedtime  tiotropium 18 MICROgram(s) Capsule 1 Capsule(s) Inhalation daily  traMADol 75 milliGRAM(s) Oral three times a day    MEDICATIONS  (PRN):  ALBUTerol    90 MICROgram(s) HFA Inhaler 1 Puff(s) Inhalation every 4 hours PRN Shortness of Breath and/or Wheezing  benzocaine 15 mG/menthol 3.6 mG Lozenge 1 Lozenge Oral every 4 hours PRN Sore Throat  glucagon  Injectable 1 milliGRAM(s) IntraMuscular once PRN Glucose LESS THAN 70 milligrams/deciliter  oxyCODONE    IR 5 milliGRAM(s) Oral every 4 hours PRN Moderate Pain (4 - 6)  oxyCODONE    IR 10 milliGRAM(s) Oral every 4 hours PRN Severe Pain (7 - 10)  sodium chloride 0.65% Nasal 1 Spray(s) Both Nostrils every 6 hours PRN Nasal Congestion  tetracaine/benzocaine/butamben Spray 1 Spray(s) Topical every 3 hours PRN sore throat      Allergies    No Known Allergies    Intolerances      Review of Systems:      ALL OTHER SYSTEMS REVIEWED AND NEGATIVE      PHYSICAL EXAM:  VITALS: T(C): 36.9 (12-24-18 @ 10:11)  T(F): 98.4 (12-24-18 @ 10:11), Max: 98.8 (12-23-18 @ 21:57)  HR: 87 (12-24-18 @ 12:00) (67 - 92)  BP: 155/98 (12-24-18 @ 12:00) (100/74 - 157/92)  RR:  (14 - 18)  SpO2:  (96% - 100%)  Wt(kg): --  GENERAL: NAD, well-groomed, well-developed  EYES: No proptosis, no lid lag, anicteric  HEENT:  Atraumatic, Normocephalic, moist mucous membranes  NEURO: extraocular movements intact, no tremor  PSYCH: Alert and oriented x 3, normal affect, normal mood      CAPILLARY BLOOD GLUCOSE      POCT Blood Glucose.: 170 mg/dL (24 Dec 2018 12:22)  POCT Blood Glucose.: 180 mg/dL (24 Dec 2018 07:52)  POCT Blood Glucose.: 219 mg/dL (23 Dec 2018 22:28)  POCT Blood Glucose.: 136 mg/dL (23 Dec 2018 17:15)      12-24    139  |  97<L>  |  16  ----------------------------<  180<H>  4.5   |  30  |  1.04    EGFR if : 67  EGFR if non : 58    Ca    8.7      12-24  Mg     2.1     12-24  Phos  3.0     12-24            Thyroid Function Tests:  12-20 @ 06:45 TSH 40.15 FreeT4 0.98 T3 -- Anti TPO -- Anti Thyroglobulin Ab -- TSI --      Hemoglobin A1C, Whole Blood: 7.1 % <H> [4.0 - 5.6] (12-17-18 @ 03:50)  Hemoglobin A1C, Whole Blood: 7.3 % <H> [4.0 - 5.6] (12-16-18 @ 04:17)

## 2018-12-24 NOTE — PROGRESS NOTE ADULT - PROBLEM SELECTOR PLAN 1
Currently has been on clear liquids but now advanced to regular diet.  Please change diet to carb consistent regular diet.  Now IV fluids are discontinued.  - Would continue Lantus 9 units at bedtime  -Add Humalog 2/2/2  -Continue moderate correction scale before meals and moderate bedtime.  - d/c likely on prandin, sitagliptin and likely lower dose tresiba pending FSG trends

## 2018-12-24 NOTE — DIETITIAN INITIAL EVALUATION ADULT. - NS AS NUTRI INTERV MEALS SNACK
Texture-modified diet/Carbohydrate - modified diet/As medically feasible advance diet as tolerated from Clear liquids, Consistent Carbohydrate--> Full liquids, Consistent Carbohydrate --> Regular, Consistent Carbohydrate diet

## 2018-12-24 NOTE — PROGRESS NOTE ADULT - ATTENDING COMMENTS
Above noted. Has pain, tolerating oral intake, passing flatus, no bowel movement.  Plan: Advance diet, pain control.

## 2018-12-24 NOTE — PROGRESS NOTE ADULT - PROBLEM SELECTOR PLAN 3
Long standing history chronic oral steroid use.   - c/w solumedrol 8 mg BID (equivalent to double home dose).  Consider change back to prednisone if tolerating oral.

## 2018-12-24 NOTE — PROGRESS NOTE ADULT - ASSESSMENT
63F w/ multiple respiratory comorbidities p/w SBO s/p ex-lap with findings of healthy bowel.    - advance to Regular diet   - PO meds   - Continue to monitor respiratory status in the setting of holding lasix  - DVT ppx  - encourage OOB   - monitor UOP     Fatoumata Danielle, PGY-1  [facility] Team [id number]

## 2018-12-24 NOTE — PROGRESS NOTE ADULT - SUBJECTIVE AND OBJECTIVE BOX
Surgery Progress Note    S: Patient seen and examined. No acute events overnight.  Pain well controlled. PAssing flatus, tolerating CLD.     O:  Vital Signs Last 24 Hrs  T(C): 36.9 (24 Dec 2018 10:11), Max: 37.1 (23 Dec 2018 21:57)  T(F): 98.4 (24 Dec 2018 10:11), Max: 98.8 (23 Dec 2018 21:57)  HR: 78 (24 Dec 2018 10:11) (67 - 92)  BP: 100/74 (24 Dec 2018 10:11) (100/74 - 161/88)  BP(mean): --  RR: 18 (24 Dec 2018 10:11) (14 - 18)  SpO2: 99% (24 Dec 2018 10:11) (96% - 100%)    I&O's Detail    23 Dec 2018 07:01  -  24 Dec 2018 07:00  --------------------------------------------------------  IN:    dextrose 5% + sodium chloride 0.45%.: 1650 mL    IV PiggyBack: 450 mL    Oral Fluid: 720 mL  Total IN: 2820 mL    OUT:    Voided: 300 mL  Total OUT: 300 mL    Total NET: 2520 mL      24 Dec 2018 07:01  -  24 Dec 2018 10:19  --------------------------------------------------------  IN:    Oral Fluid: 150 mL  Total IN: 150 mL    OUT:    Voided: 200 mL  Total OUT: 200 mL    Total NET: -50 mL          MEDICATIONS  (STANDING):  aspirin Suppository 300 milliGRAM(s) Rectal daily  buDESOnide 160 MICROgram(s)/formoterol 4.5 MICROgram(s) Inhaler 2 Puff(s) Inhalation two times a day  chlorhexidine 4% Liquid 1 Application(s) Topical <User Schedule>  ciprofloxacin   IVPB 400 milliGRAM(s) IV Intermittent every 12 hours  ciprofloxacin   IVPB      dextrose 5% + sodium chloride 0.45%. 1000 milliLiter(s) (75 mL/Hr) IV Continuous <Continuous>  dextrose 50% Injectable 25 milliLiter(s) IV Push once  fluticasone propionate 50 MICROgram(s)/spray Nasal Spray 1 Spray(s) Both Nostrils two times a day  gabapentin 400 milliGRAM(s) Oral three times a day  heparin  Injectable 5000 Unit(s) SubCutaneous every 8 hours  influenza   Vaccine 0.5 milliLiter(s) IntraMuscular once  insulin glargine Injectable (LANTUS) 9 Unit(s) SubCutaneous at bedtime  insulin lispro (HumaLOG) corrective regimen sliding scale   SubCutaneous three times a day before meals  insulin lispro (HumaLOG) corrective regimen sliding scale   SubCutaneous at bedtime  levothyroxine Injectable 103 MICROGram(s) IV Push at bedtime  methylPREDNISolone sodium succinate Injectable 8 milliGRAM(s) IV Push every 12 hours  metoprolol tartrate Injectable 5 milliGRAM(s) IV Push every 6 hours  pantoprazole  Injectable 40 milliGRAM(s) IV Push daily  tiotropium 18 MICROgram(s) Capsule 1 Capsule(s) Inhalation daily    MEDICATIONS  (PRN):  ALBUTerol    90 MICROgram(s) HFA Inhaler 1 Puff(s) Inhalation every 4 hours PRN Shortness of Breath and/or Wheezing  benzocaine 15 mG/menthol 3.6 mG Lozenge 1 Lozenge Oral every 4 hours PRN Sore Throat  glucagon  Injectable 1 milliGRAM(s) IntraMuscular once PRN Glucose LESS THAN 70 milligrams/deciliter  HYDROmorphone  Injectable 0.5 milliGRAM(s) IV Push every 3 hours PRN Mild Pain (1 - 3) to Moderate Pain  HYDROmorphone  Injectable 1 milliGRAM(s) IV Push every 3 hours PRN Severe Pain (7 - 10)  sodium chloride 0.65% Nasal 1 Spray(s) Both Nostrils every 6 hours PRN Nasal Congestion  tetracaine/benzocaine/butamben Spray 1 Spray(s) Topical every 3 hours PRN sore throat                            10.2   7.80  )-----------( 400      ( 24 Dec 2018 06:40 )             33.4       12-24    139  |  97<L>  |  16  ----------------------------<  180<H>  4.5   |  30  |  1.04    Ca    8.7      24 Dec 2018 06:40  Phos  3.0     12-24  Mg     2.1     12-24        Physical Exam:  Gen: Laying in bed, NAD, NGT clamped  Resp: Unlabored breathing  Abd: soft, NT, mildly distended, incision c/d/i   Ext: WWP  Skin: No rashes

## 2018-12-24 NOTE — DIETITIAN INITIAL EVALUATION ADULT. - PERTINENT LABORATORY DATA
12-24 Na139 mmol/L Glu 180 mg/dL<H> K+ 4.5 mmol/L Cr  1.04 mg/dL BUN 16 mg/dL 12-24 Phos 3.0 mg/dL 12-21 Alb 3.2 g/dL<L> 12-17 [id number] 7.1 %<H>

## 2018-12-25 LAB
BACTERIA BLD CULT: SIGNIFICANT CHANGE UP
BUN SERPL-MCNC: 14 MG/DL — SIGNIFICANT CHANGE UP (ref 7–23)
CALCIUM SERPL-MCNC: 8.9 MG/DL — SIGNIFICANT CHANGE UP (ref 8.4–10.5)
CHLORIDE SERPL-SCNC: 100 MMOL/L — SIGNIFICANT CHANGE UP (ref 98–107)
CO2 SERPL-SCNC: 33 MMOL/L — HIGH (ref 22–31)
CREAT SERPL-MCNC: 0.95 MG/DL — SIGNIFICANT CHANGE UP (ref 0.5–1.3)
GLUCOSE BLDC GLUCOMTR-MCNC: 102 MG/DL — HIGH (ref 70–99)
GLUCOSE BLDC GLUCOMTR-MCNC: 113 MG/DL — HIGH (ref 70–99)
GLUCOSE BLDC GLUCOMTR-MCNC: 120 MG/DL — HIGH (ref 70–99)
GLUCOSE BLDC GLUCOMTR-MCNC: 131 MG/DL — HIGH (ref 70–99)
GLUCOSE BLDC GLUCOMTR-MCNC: 163 MG/DL — HIGH (ref 70–99)
GLUCOSE SERPL-MCNC: 131 MG/DL — HIGH (ref 70–99)
HCT VFR BLD CALC: 32.9 % — LOW (ref 34.5–45)
HGB BLD-MCNC: 10.2 G/DL — LOW (ref 11.5–15.5)
MAGNESIUM SERPL-MCNC: 2.2 MG/DL — SIGNIFICANT CHANGE UP (ref 1.6–2.6)
MCHC RBC-ENTMCNC: 30.8 PG — SIGNIFICANT CHANGE UP (ref 27–34)
MCHC RBC-ENTMCNC: 31 % — LOW (ref 32–36)
MCV RBC AUTO: 99.4 FL — SIGNIFICANT CHANGE UP (ref 80–100)
NRBC # FLD: 0 — SIGNIFICANT CHANGE UP
PHOSPHATE SERPL-MCNC: 2.2 MG/DL — LOW (ref 2.5–4.5)
PLATELET # BLD AUTO: 377 K/UL — SIGNIFICANT CHANGE UP (ref 150–400)
PMV BLD: 9.3 FL — SIGNIFICANT CHANGE UP (ref 7–13)
POTASSIUM SERPL-MCNC: 4.2 MMOL/L — SIGNIFICANT CHANGE UP (ref 3.5–5.3)
POTASSIUM SERPL-SCNC: 4.2 MMOL/L — SIGNIFICANT CHANGE UP (ref 3.5–5.3)
RBC # BLD: 3.31 M/UL — LOW (ref 3.8–5.2)
RBC # FLD: 14.1 % — SIGNIFICANT CHANGE UP (ref 10.3–14.5)
SODIUM SERPL-SCNC: 143 MMOL/L — SIGNIFICANT CHANGE UP (ref 135–145)
WBC # BLD: 11.16 K/UL — HIGH (ref 3.8–10.5)
WBC # FLD AUTO: 11.16 K/UL — HIGH (ref 3.8–10.5)

## 2018-12-25 PROCEDURE — 74018 RADEX ABDOMEN 1 VIEW: CPT | Mod: 26

## 2018-12-25 RX ORDER — LORATADINE 10 MG/1
10 TABLET ORAL ONCE
Qty: 0 | Refills: 0 | Status: DISCONTINUED | OUTPATIENT
Start: 2018-12-25 | End: 2018-12-26

## 2018-12-25 RX ORDER — POTASSIUM PHOSPHATE, MONOBASIC POTASSIUM PHOSPHATE, DIBASIC 236; 224 MG/ML; MG/ML
15 INJECTION, SOLUTION INTRAVENOUS ONCE
Qty: 0 | Refills: 0 | Status: COMPLETED | OUTPATIENT
Start: 2018-12-25 | End: 2018-12-25

## 2018-12-25 RX ADMIN — TRAMADOL HYDROCHLORIDE 75 MILLIGRAM(S): 50 TABLET ORAL at 14:30

## 2018-12-25 RX ADMIN — OXYCODONE HYDROCHLORIDE 10 MILLIGRAM(S): 5 TABLET ORAL at 14:12

## 2018-12-25 RX ADMIN — Medication 500 MILLIGRAM(S): at 06:25

## 2018-12-25 RX ADMIN — Medication 2 UNIT(S): at 18:45

## 2018-12-25 RX ADMIN — FAMOTIDINE 40 MILLIGRAM(S): 10 INJECTION INTRAVENOUS at 11:56

## 2018-12-25 RX ADMIN — TRAMADOL HYDROCHLORIDE 75 MILLIGRAM(S): 50 TABLET ORAL at 09:10

## 2018-12-25 RX ADMIN — Medication 5 MILLIGRAM(S): at 06:30

## 2018-12-25 RX ADMIN — Medication 5 MILLIGRAM(S): at 18:39

## 2018-12-25 RX ADMIN — TRAMADOL HYDROCHLORIDE 75 MILLIGRAM(S): 50 TABLET ORAL at 23:14

## 2018-12-25 RX ADMIN — OXYCODONE HYDROCHLORIDE 10 MILLIGRAM(S): 5 TABLET ORAL at 18:41

## 2018-12-25 RX ADMIN — Medication 1 SPRAY(S): at 18:42

## 2018-12-25 RX ADMIN — LORATADINE 10 MILLIGRAM(S): 10 TABLET ORAL at 11:59

## 2018-12-25 RX ADMIN — Medication 2 UNIT(S): at 09:12

## 2018-12-25 RX ADMIN — INSULIN GLARGINE 9 UNIT(S): 100 INJECTION, SOLUTION SUBCUTANEOUS at 22:42

## 2018-12-25 RX ADMIN — Medication 50 MILLIGRAM(S): at 06:24

## 2018-12-25 RX ADMIN — GABAPENTIN 400 MILLIGRAM(S): 400 CAPSULE ORAL at 14:12

## 2018-12-25 RX ADMIN — AMLODIPINE BESYLATE 5 MILLIGRAM(S): 2.5 TABLET ORAL at 06:24

## 2018-12-25 RX ADMIN — Medication 1 SPRAY(S): at 18:46

## 2018-12-25 RX ADMIN — TRAMADOL HYDROCHLORIDE 75 MILLIGRAM(S): 50 TABLET ORAL at 22:43

## 2018-12-25 RX ADMIN — TRAMADOL HYDROCHLORIDE 75 MILLIGRAM(S): 50 TABLET ORAL at 14:45

## 2018-12-25 RX ADMIN — POTASSIUM PHOSPHATE, MONOBASIC POTASSIUM PHOSPHATE, DIBASIC 62.5 MILLIMOLE(S): 236; 224 INJECTION, SOLUTION INTRAVENOUS at 11:58

## 2018-12-25 RX ADMIN — TRAMADOL HYDROCHLORIDE 75 MILLIGRAM(S): 50 TABLET ORAL at 03:28

## 2018-12-25 RX ADMIN — BUDESONIDE AND FORMOTEROL FUMARATE DIHYDRATE 2 PUFF(S): 160; 4.5 AEROSOL RESPIRATORY (INHALATION) at 18:42

## 2018-12-25 RX ADMIN — GABAPENTIN 400 MILLIGRAM(S): 400 CAPSULE ORAL at 22:42

## 2018-12-25 RX ADMIN — Medication 100 MILLIGRAM(S): at 18:44

## 2018-12-25 RX ADMIN — OXYCODONE HYDROCHLORIDE 10 MILLIGRAM(S): 5 TABLET ORAL at 09:09

## 2018-12-25 RX ADMIN — Medication 2 UNIT(S): at 14:09

## 2018-12-25 RX ADMIN — SIMVASTATIN 20 MILLIGRAM(S): 20 TABLET, FILM COATED ORAL at 22:42

## 2018-12-25 RX ADMIN — OXYCODONE HYDROCHLORIDE 10 MILLIGRAM(S): 5 TABLET ORAL at 22:44

## 2018-12-25 RX ADMIN — Medication 15 MILLIGRAM(S): at 06:25

## 2018-12-25 RX ADMIN — HEPARIN SODIUM 5000 UNIT(S): 5000 INJECTION INTRAVENOUS; SUBCUTANEOUS at 14:12

## 2018-12-25 RX ADMIN — GABAPENTIN 400 MILLIGRAM(S): 400 CAPSULE ORAL at 06:24

## 2018-12-25 RX ADMIN — OXYCODONE HYDROCHLORIDE 10 MILLIGRAM(S): 5 TABLET ORAL at 14:27

## 2018-12-25 RX ADMIN — CLOPIDOGREL BISULFATE 75 MILLIGRAM(S): 75 TABLET, FILM COATED ORAL at 11:56

## 2018-12-25 RX ADMIN — OXYCODONE HYDROCHLORIDE 10 MILLIGRAM(S): 5 TABLET ORAL at 23:14

## 2018-12-25 RX ADMIN — TRAMADOL HYDROCHLORIDE 75 MILLIGRAM(S): 50 TABLET ORAL at 09:24

## 2018-12-25 RX ADMIN — OXYCODONE HYDROCHLORIDE 10 MILLIGRAM(S): 5 TABLET ORAL at 18:59

## 2018-12-25 RX ADMIN — TRAMADOL HYDROCHLORIDE 75 MILLIGRAM(S): 50 TABLET ORAL at 03:58

## 2018-12-25 RX ADMIN — Medication 500 MILLIGRAM(S): at 18:38

## 2018-12-25 RX ADMIN — Medication 100 MILLIGRAM(S): at 11:57

## 2018-12-25 RX ADMIN — Medication 137 MICROGRAM(S): at 06:25

## 2018-12-25 RX ADMIN — OXYCODONE HYDROCHLORIDE 10 MILLIGRAM(S): 5 TABLET ORAL at 09:24

## 2018-12-25 RX ADMIN — OXYCODONE HYDROCHLORIDE 10 MILLIGRAM(S): 5 TABLET ORAL at 00:16

## 2018-12-25 RX ADMIN — OXYCODONE HYDROCHLORIDE 10 MILLIGRAM(S): 5 TABLET ORAL at 04:04

## 2018-12-25 NOTE — PROGRESS NOTE ADULT - ASSESSMENT
63F w/ multiple respiratory comorbidities p/w SBO s/p ex-lap with findings of healthy bowel.    - abdominal xray to evaluate for distention of bowel/ileus  - PO meds   - Continue to monitor respiratory status in the setting of holding lasix  - DVT ppx  - encourage OOB   - monitor UOP

## 2018-12-25 NOTE — PROGRESS NOTE ADULT - SUBJECTIVE AND OBJECTIVE BOX
Surgery Progress Note    S: Patient seen and examined. No acute events overnight.  Pain well controlled. Psssing flatus, denies having BM.  Reports some nausea and regurge.     O:  Vital Signs Last 24 Hrs  T(C): 36.8 (25 Dec 2018 10:00), Max: 37.7 (24 Dec 2018 19:48)  T(F): 98.2 (25 Dec 2018 10:00), Max: 99.8 (24 Dec 2018 19:48)  HR: 84 (25 Dec 2018 10:00) (72 - 88)  BP: 134/92 (25 Dec 2018 10:00) (115/80 - 145/90)  BP(mean): --  RR: 18 (25 Dec 2018 10:00) (18 - 18)  SpO2: 100% (25 Dec 2018 10:00) (91% - 100%)    I&O's Detail    24 Dec 2018 07:01  -  25 Dec 2018 07:00  --------------------------------------------------------  IN:    Oral Fluid: 520 mL  Total IN: 520 mL    OUT:    Voided: 600 mL  Total OUT: 600 mL    Total NET: -80 mL        MEDICATIONS  (STANDING):  aspirin Suppository 300 milliGRAM(s) Rectal daily  buDESOnide 160 MICROgram(s)/formoterol 4.5 MICROgram(s) Inhaler 2 Puff(s) Inhalation two times a day  chlorhexidine 4% Liquid 1 Application(s) Topical <User Schedule>  ciprofloxacin   IVPB 400 milliGRAM(s) IV Intermittent every 12 hours  ciprofloxacin   IVPB      dextrose 5% + sodium chloride 0.45%. 1000 milliLiter(s) (75 mL/Hr) IV Continuous <Continuous>  dextrose 50% Injectable 25 milliLiter(s) IV Push once  fluticasone propionate 50 MICROgram(s)/spray Nasal Spray 1 Spray(s) Both Nostrils two times a day  gabapentin 400 milliGRAM(s) Oral three times a day  heparin  Injectable 5000 Unit(s) SubCutaneous every 8 hours  influenza   Vaccine 0.5 milliLiter(s) IntraMuscular once  insulin glargine Injectable (LANTUS) 9 Unit(s) SubCutaneous at bedtime  insulin lispro (HumaLOG) corrective regimen sliding scale   SubCutaneous three times a day before meals  insulin lispro (HumaLOG) corrective regimen sliding scale   SubCutaneous at bedtime  levothyroxine Injectable 103 MICROGram(s) IV Push at bedtime  methylPREDNISolone sodium succinate Injectable 8 milliGRAM(s) IV Push every 12 hours  metoprolol tartrate Injectable 5 milliGRAM(s) IV Push every 6 hours  pantoprazole  Injectable 40 milliGRAM(s) IV Push daily  tiotropium 18 MICROgram(s) Capsule 1 Capsule(s) Inhalation daily    MEDICATIONS  (PRN):  ALBUTerol    90 MICROgram(s) HFA Inhaler 1 Puff(s) Inhalation every 4 hours PRN Shortness of Breath and/or Wheezing  benzocaine 15 mG/menthol 3.6 mG Lozenge 1 Lozenge Oral every 4 hours PRN Sore Throat  glucagon  Injectable 1 milliGRAM(s) IntraMuscular once PRN Glucose LESS THAN 70 milligrams/deciliter  HYDROmorphone  Injectable 0.5 milliGRAM(s) IV Push every 3 hours PRN Mild Pain (1 - 3) to Moderate Pain  HYDROmorphone  Injectable 1 milliGRAM(s) IV Push every 3 hours PRN Severe Pain (7 - 10)  sodium chloride 0.65% Nasal 1 Spray(s) Both Nostrils every 6 hours PRN Nasal Congestion  tetracaine/benzocaine/butamben Spray 1 Spray(s) Topical every 3 hours PRN sore throat              Physical Exam:  Gen: Laying in bed, NAD  Resp: Unlabored breathing  Abd: soft, NT, mildly distended, incision c/d/i   Ext: WWP  Skin: No rashes                                   10.2   11.16 )-----------( 377      ( 25 Dec 2018 05:50 )             32.9     12-25    143  |  100  |  14  ----------------------------<  131<H>  4.2   |  33<H>  |  0.95    Ca    8.9      25 Dec 2018 05:50  Phos  2.2     12-25  Mg     2.2     12-25

## 2018-12-26 DIAGNOSIS — K56.609 UNSPECIFIED INTESTINAL OBSTRUCTION, UNSPECIFIED AS TO PARTIAL VERSUS COMPLETE OBSTRUCTION: ICD-10-CM

## 2018-12-26 LAB
BUN SERPL-MCNC: 12 MG/DL — SIGNIFICANT CHANGE UP (ref 7–23)
CALCIUM SERPL-MCNC: 9.4 MG/DL — SIGNIFICANT CHANGE UP (ref 8.4–10.5)
CHLORIDE SERPL-SCNC: 100 MMOL/L — SIGNIFICANT CHANGE UP (ref 98–107)
CO2 SERPL-SCNC: 32 MMOL/L — HIGH (ref 22–31)
CREAT SERPL-MCNC: 0.99 MG/DL — SIGNIFICANT CHANGE UP (ref 0.5–1.3)
GLUCOSE BLDC GLUCOMTR-MCNC: 101 MG/DL — HIGH (ref 70–99)
GLUCOSE BLDC GLUCOMTR-MCNC: 112 MG/DL — HIGH (ref 70–99)
GLUCOSE BLDC GLUCOMTR-MCNC: 186 MG/DL — HIGH (ref 70–99)
GLUCOSE BLDC GLUCOMTR-MCNC: 92 MG/DL — SIGNIFICANT CHANGE UP (ref 70–99)
GLUCOSE SERPL-MCNC: 87 MG/DL — SIGNIFICANT CHANGE UP (ref 70–99)
HCT VFR BLD CALC: 36.1 % — SIGNIFICANT CHANGE UP (ref 34.5–45)
HGB BLD-MCNC: 10.9 G/DL — LOW (ref 11.5–15.5)
MAGNESIUM SERPL-MCNC: 2 MG/DL — SIGNIFICANT CHANGE UP (ref 1.6–2.6)
MCHC RBC-ENTMCNC: 30.2 % — LOW (ref 32–36)
MCHC RBC-ENTMCNC: 31 PG — SIGNIFICANT CHANGE UP (ref 27–34)
MCV RBC AUTO: 102.6 FL — HIGH (ref 80–100)
NRBC # FLD: 0.03 — SIGNIFICANT CHANGE UP
PHOSPHATE SERPL-MCNC: 3.6 MG/DL — SIGNIFICANT CHANGE UP (ref 2.5–4.5)
PLATELET # BLD AUTO: 402 K/UL — HIGH (ref 150–400)
PMV BLD: 9 FL — SIGNIFICANT CHANGE UP (ref 7–13)
POTASSIUM SERPL-MCNC: 4.2 MMOL/L — SIGNIFICANT CHANGE UP (ref 3.5–5.3)
POTASSIUM SERPL-SCNC: 4.2 MMOL/L — SIGNIFICANT CHANGE UP (ref 3.5–5.3)
RBC # BLD: 3.52 M/UL — LOW (ref 3.8–5.2)
RBC # FLD: 14.3 % — SIGNIFICANT CHANGE UP (ref 10.3–14.5)
SODIUM SERPL-SCNC: 144 MMOL/L — SIGNIFICANT CHANGE UP (ref 135–145)
WBC # BLD: 11.96 K/UL — HIGH (ref 3.8–10.5)
WBC # FLD AUTO: 11.96 K/UL — HIGH (ref 3.8–10.5)

## 2018-12-26 PROCEDURE — 99232 SBSQ HOSP IP/OBS MODERATE 35: CPT

## 2018-12-26 PROCEDURE — 71045 X-RAY EXAM CHEST 1 VIEW: CPT | Mod: 26

## 2018-12-26 PROCEDURE — 71045 X-RAY EXAM CHEST 1 VIEW: CPT | Mod: 26,77

## 2018-12-26 RX ORDER — LIDOCAINE 4 G/100G
1 CREAM TOPICAL ONCE
Qty: 0 | Refills: 0 | Status: COMPLETED | OUTPATIENT
Start: 2018-12-26 | End: 2018-12-26

## 2018-12-26 RX ORDER — INSULIN LISPRO 100/ML
VIAL (ML) SUBCUTANEOUS AT BEDTIME
Qty: 0 | Refills: 0 | Status: DISCONTINUED | OUTPATIENT
Start: 2018-12-26 | End: 2018-12-26

## 2018-12-26 RX ORDER — HYDROMORPHONE HYDROCHLORIDE 2 MG/ML
1 INJECTION INTRAMUSCULAR; INTRAVENOUS; SUBCUTANEOUS ONCE
Qty: 0 | Refills: 0 | Status: DISCONTINUED | OUTPATIENT
Start: 2018-12-26 | End: 2018-12-26

## 2018-12-26 RX ORDER — TETRACAINE/BENZOCAINE/BUTAMBEN 2%-14%-2%
1 OINTMENT (GRAM) TOPICAL EVERY 4 HOURS
Qty: 0 | Refills: 0 | Status: DISCONTINUED | OUTPATIENT
Start: 2018-12-26 | End: 2018-12-26

## 2018-12-26 RX ORDER — DEXTROSE MONOHYDRATE, SODIUM CHLORIDE, AND POTASSIUM CHLORIDE 50; .745; 4.5 G/1000ML; G/1000ML; G/1000ML
1000 INJECTION, SOLUTION INTRAVENOUS
Qty: 0 | Refills: 0 | Status: DISCONTINUED | OUTPATIENT
Start: 2018-12-26 | End: 2018-12-27

## 2018-12-26 RX ORDER — PANTOPRAZOLE SODIUM 20 MG/1
40 TABLET, DELAYED RELEASE ORAL
Qty: 0 | Refills: 0 | Status: DISCONTINUED | OUTPATIENT
Start: 2018-12-26 | End: 2018-12-31

## 2018-12-26 RX ORDER — FAMOTIDINE 10 MG/ML
20 INJECTION INTRAVENOUS DAILY
Qty: 0 | Refills: 0 | Status: DISCONTINUED | OUTPATIENT
Start: 2018-12-26 | End: 2018-12-28

## 2018-12-26 RX ORDER — METOPROLOL TARTRATE 50 MG
5 TABLET ORAL EVERY 6 HOURS
Qty: 0 | Refills: 0 | Status: DISCONTINUED | OUTPATIENT
Start: 2018-12-26 | End: 2019-01-02

## 2018-12-26 RX ORDER — CEFTRIAXONE 500 MG/1
1 INJECTION, POWDER, FOR SOLUTION INTRAMUSCULAR; INTRAVENOUS EVERY 24 HOURS
Qty: 0 | Refills: 0 | Status: DISCONTINUED | OUTPATIENT
Start: 2018-12-26 | End: 2018-12-30

## 2018-12-26 RX ORDER — LEVOTHYROXINE SODIUM 125 MCG
103 TABLET ORAL AT BEDTIME
Qty: 0 | Refills: 0 | Status: DISCONTINUED | OUTPATIENT
Start: 2018-12-26 | End: 2018-12-31

## 2018-12-26 RX ORDER — HYDROMORPHONE HYDROCHLORIDE 2 MG/ML
1 INJECTION INTRAMUSCULAR; INTRAVENOUS; SUBCUTANEOUS EVERY 4 HOURS
Qty: 0 | Refills: 0 | Status: DISCONTINUED | OUTPATIENT
Start: 2018-12-26 | End: 2018-12-30

## 2018-12-26 RX ORDER — INSULIN LISPRO 100/ML
VIAL (ML) SUBCUTANEOUS EVERY 6 HOURS
Qty: 0 | Refills: 0 | Status: DISCONTINUED | OUTPATIENT
Start: 2018-12-26 | End: 2018-12-29

## 2018-12-26 RX ORDER — ONDANSETRON 8 MG/1
4 TABLET, FILM COATED ORAL ONCE
Qty: 0 | Refills: 0 | Status: COMPLETED | OUTPATIENT
Start: 2018-12-26 | End: 2018-12-26

## 2018-12-26 RX ORDER — TETRACAINE/BENZOCAINE/BUTAMBEN 2%-14%-2%
1 OINTMENT (GRAM) TOPICAL EVERY 8 HOURS
Qty: 0 | Refills: 0 | Status: DISCONTINUED | OUTPATIENT
Start: 2018-12-26 | End: 2018-12-30

## 2018-12-26 RX ORDER — INSULIN GLARGINE 100 [IU]/ML
5 INJECTION, SOLUTION SUBCUTANEOUS AT BEDTIME
Qty: 0 | Refills: 0 | Status: DISCONTINUED | OUTPATIENT
Start: 2018-12-26 | End: 2018-12-27

## 2018-12-26 RX ORDER — METOCLOPRAMIDE HCL 10 MG
10 TABLET ORAL EVERY 8 HOURS
Qty: 0 | Refills: 0 | Status: DISCONTINUED | OUTPATIENT
Start: 2018-12-26 | End: 2018-12-30

## 2018-12-26 RX ORDER — HYDROMORPHONE HYDROCHLORIDE 2 MG/ML
0.5 INJECTION INTRAMUSCULAR; INTRAVENOUS; SUBCUTANEOUS
Qty: 0 | Refills: 0 | Status: DISCONTINUED | OUTPATIENT
Start: 2018-12-26 | End: 2018-12-30

## 2018-12-26 RX ADMIN — OXYCODONE HYDROCHLORIDE 10 MILLIGRAM(S): 5 TABLET ORAL at 05:57

## 2018-12-26 RX ADMIN — GABAPENTIN 400 MILLIGRAM(S): 400 CAPSULE ORAL at 05:57

## 2018-12-26 RX ADMIN — Medication 10 MILLIGRAM(S): at 22:32

## 2018-12-26 RX ADMIN — HYDROMORPHONE HYDROCHLORIDE 1 MILLIGRAM(S): 2 INJECTION INTRAMUSCULAR; INTRAVENOUS; SUBCUTANEOUS at 13:05

## 2018-12-26 RX ADMIN — CLOPIDOGREL BISULFATE 75 MILLIGRAM(S): 75 TABLET, FILM COATED ORAL at 08:36

## 2018-12-26 RX ADMIN — AMLODIPINE BESYLATE 5 MILLIGRAM(S): 2.5 TABLET ORAL at 05:57

## 2018-12-26 RX ADMIN — HYDROMORPHONE HYDROCHLORIDE 1 MILLIGRAM(S): 2 INJECTION INTRAMUSCULAR; INTRAVENOUS; SUBCUTANEOUS at 12:51

## 2018-12-26 RX ADMIN — TRAMADOL HYDROCHLORIDE 75 MILLIGRAM(S): 50 TABLET ORAL at 05:58

## 2018-12-26 RX ADMIN — Medication 1 SPRAY(S): at 05:57

## 2018-12-26 RX ADMIN — PANTOPRAZOLE SODIUM 40 MILLIGRAM(S): 20 TABLET, DELAYED RELEASE ORAL at 05:57

## 2018-12-26 RX ADMIN — HYDROMORPHONE HYDROCHLORIDE 1 MILLIGRAM(S): 2 INJECTION INTRAMUSCULAR; INTRAVENOUS; SUBCUTANEOUS at 22:26

## 2018-12-26 RX ADMIN — CEFTRIAXONE 100 GRAM(S): 500 INJECTION, POWDER, FOR SOLUTION INTRAMUSCULAR; INTRAVENOUS at 17:32

## 2018-12-26 RX ADMIN — HYDROMORPHONE HYDROCHLORIDE 1 MILLIGRAM(S): 2 INJECTION INTRAMUSCULAR; INTRAVENOUS; SUBCUTANEOUS at 21:56

## 2018-12-26 RX ADMIN — TRAMADOL HYDROCHLORIDE 75 MILLIGRAM(S): 50 TABLET ORAL at 06:27

## 2018-12-26 RX ADMIN — Medication 500 MILLIGRAM(S): at 05:57

## 2018-12-26 RX ADMIN — Medication 5 MILLIGRAM(S): at 22:38

## 2018-12-26 RX ADMIN — FAMOTIDINE 40 MILLIGRAM(S): 10 INJECTION INTRAVENOUS at 08:36

## 2018-12-26 RX ADMIN — Medication 15 MILLIGRAM(S): at 05:56

## 2018-12-26 RX ADMIN — Medication 100 MILLIGRAM(S): at 08:36

## 2018-12-26 RX ADMIN — BUDESONIDE AND FORMOTEROL FUMARATE DIHYDRATE 2 PUFF(S): 160; 4.5 AEROSOL RESPIRATORY (INHALATION) at 22:32

## 2018-12-26 RX ADMIN — Medication 8 MILLIGRAM(S): at 17:29

## 2018-12-26 RX ADMIN — PANTOPRAZOLE SODIUM 40 MILLIGRAM(S): 20 TABLET, DELAYED RELEASE ORAL at 17:32

## 2018-12-26 RX ADMIN — Medication 103 MICROGRAM(S): at 22:32

## 2018-12-26 RX ADMIN — HYDROMORPHONE HYDROCHLORIDE 1 MILLIGRAM(S): 2 INJECTION INTRAMUSCULAR; INTRAVENOUS; SUBCUTANEOUS at 17:47

## 2018-12-26 RX ADMIN — Medication 1 SPRAY(S): at 13:59

## 2018-12-26 RX ADMIN — Medication 5 MILLIGRAM(S): at 05:57

## 2018-12-26 RX ADMIN — HYDROMORPHONE HYDROCHLORIDE 1 MILLIGRAM(S): 2 INJECTION INTRAMUSCULAR; INTRAVENOUS; SUBCUTANEOUS at 17:32

## 2018-12-26 RX ADMIN — DEXTROSE MONOHYDRATE, SODIUM CHLORIDE, AND POTASSIUM CHLORIDE 50 MILLILITER(S): 50; .745; 4.5 INJECTION, SOLUTION INTRAVENOUS at 17:33

## 2018-12-26 RX ADMIN — LIDOCAINE 1 APPLICATION(S): 4 CREAM TOPICAL at 14:00

## 2018-12-26 RX ADMIN — HEPARIN SODIUM 5000 UNIT(S): 5000 INJECTION INTRAVENOUS; SUBCUTANEOUS at 05:56

## 2018-12-26 RX ADMIN — Medication 5 MILLIGRAM(S): at 17:29

## 2018-12-26 RX ADMIN — INSULIN GLARGINE 5 UNIT(S): 100 INJECTION, SOLUTION SUBCUTANEOUS at 22:38

## 2018-12-26 RX ADMIN — ONDANSETRON 4 MILLIGRAM(S): 8 TABLET, FILM COATED ORAL at 12:51

## 2018-12-26 RX ADMIN — Medication 2 UNIT(S): at 08:37

## 2018-12-26 RX ADMIN — HEPARIN SODIUM 5000 UNIT(S): 5000 INJECTION INTRAVENOUS; SUBCUTANEOUS at 14:00

## 2018-12-26 RX ADMIN — OXYCODONE HYDROCHLORIDE 10 MILLIGRAM(S): 5 TABLET ORAL at 06:27

## 2018-12-26 RX ADMIN — Medication 137 MICROGRAM(S): at 05:57

## 2018-12-26 RX ADMIN — Medication 50 MILLIGRAM(S): at 05:57

## 2018-12-26 NOTE — PROGRESS NOTE ADULT - ASSESSMENT
63F w/ multiple respiratory comorbidities p/w SBO s/p ex-lap with findings of healthy bowel.    - PPI BID   - PO meds   - Continue to monitor respiratory status in the setting of holding lasix  - DVT ppx  - encourage OOB   - monitor UOP     Fatoumata Danielle, PGY-1  [facility] Team [id number]

## 2018-12-26 NOTE — CONSULT NOTE ADULT - PROBLEM SELECTOR RECOMMENDATION 9
- noted on CT Abd on admission with repeat imaging showing resolution, however, pt remains clinically symptomatic   - trend labs  - IVF   - recommend Protonix 40mg IVP BID until nausea resolves, then change to PO  - zofran prn  - monitor for GI function   - recommend NPO with NGT placement - noted on CT Abd on admission with repeat imaging showing resolution, however, pt remains clinically symptomatic   - trend labs  - IVF   - recommend Protonix 40mg IVP BID until nausea resolves, then change to PO  - zofran prn  - monitor for GI function   - recommend NPO with NGT placement  - surgery following - noted on CT Abd on admission with repeat imaging showing resolution, however, pt remains clinically symptomatic   - trend labs  - IVF   - recommend Protonix 40mg IVP BID until nausea resolves, then change to PO  - consider reglan q6h given repeat CT showing resolution of obstruction if zofran is not working   - monitor for GI function   - recommend NPO with NGT placement  - surgery following

## 2018-12-26 NOTE — PROGRESS NOTE ADULT - SUBJECTIVE AND OBJECTIVE BOX
Surgery Progress Note    S: Patient seen and examined. No acute events overnight. Pain well controlled. C/o of reflux symptoms, spitting bilious content; however, denies nausea or vomiting. Reports to be passing flatus and had BM.     O:  Vital Signs Last 24 Hrs  T(C): 37.3 (26 Dec 2018 05:49), Max: 37.3 (26 Dec 2018 05:49)  T(F): 99.2 (26 Dec 2018 05:49), Max: 99.2 (26 Dec 2018 05:49)  HR: 73 (26 Dec 2018 05:49) (72 - 80)  BP: 151/89 (26 Dec 2018 05:49) (126/89 - 156/89)  BP(mean): --  RR: 17 (26 Dec 2018 05:49) (16 - 17)  SpO2: 95% (26 Dec 2018 05:49) (95% - 100%)    I&O's Detail    25 Dec 2018 07:01  -  26 Dec 2018 07:00  --------------------------------------------------------  IN:    Oral Fluid: 300 mL  Total IN: 300 mL    OUT:    Voided: 200 mL  Total OUT: 200 mL    Total NET: 100 mL          MEDICATIONS  (STANDING):  amLODIPine   Tablet 5 milliGRAM(s) Oral daily  buDESOnide 160 MICROgram(s)/formoterol 4.5 MICROgram(s) Inhaler 2 Puff(s) Inhalation two times a day  chlorhexidine 4% Liquid 1 Application(s) Topical <User Schedule>  clopidogrel Tablet 75 milliGRAM(s) Oral daily  dextrose 50% Injectable 25 milliLiter(s) IV Push once  famotidine    Tablet 40 milliGRAM(s) Oral daily  fluticasone propionate 50 MICROgram(s)/spray Nasal Spray 1 Spray(s) Both Nostrils two times a day  gabapentin 400 milliGRAM(s) Oral three times a day  heparin  Injectable 5000 Unit(s) SubCutaneous every 8 hours  influenza   Vaccine 0.5 milliLiter(s) IntraMuscular once  insulin glargine Injectable (LANTUS) 9 Unit(s) SubCutaneous at bedtime  insulin lispro (HumaLOG) corrective regimen sliding scale   SubCutaneous three times a day before meals  insulin lispro (HumaLOG) corrective regimen sliding scale   SubCutaneous at bedtime  insulin lispro Injectable (HumaLOG) 2 Unit(s) SubCutaneous three times a day before meals  levothyroxine 137 MICROGram(s) Oral daily  loratadine 10 milliGRAM(s) Oral daily  metoprolol succinate ER 50 milliGRAM(s) Oral daily  nitrofurantoin monohydrate/macrocrystals (MACROBID) 100 milliGRAM(s) Oral two times a day with meals  oxybutynin 5 milliGRAM(s) Oral two times a day  pantoprazole  Injectable 40 milliGRAM(s) IV Push two times a day  predniSONE   Tablet 15 milliGRAM(s) Oral daily  simvastatin 20 milliGRAM(s) Oral at bedtime  sulfaSALAzine 500 milliGRAM(s) Oral two times a day  tiotropium 18 MICROgram(s) Capsule 1 Capsule(s) Inhalation daily  traMADol 75 milliGRAM(s) Oral every 6 hours    MEDICATIONS  (PRN):  ALBUTerol    90 MICROgram(s) HFA Inhaler 1 Puff(s) Inhalation every 4 hours PRN Shortness of Breath and/or Wheezing  benzocaine 15 mG/menthol 3.6 mG Lozenge 1 Lozenge Oral every 4 hours PRN Sore Throat  docusate sodium 100 milliGRAM(s) Oral three times a day PRN Constipation  glucagon  Injectable 1 milliGRAM(s) IntraMuscular once PRN Glucose LESS THAN 70 milligrams/deciliter  loratadine 10 milliGRAM(s) Oral once PRN allergy symptoms  oxyCODONE    IR 5 milliGRAM(s) Oral every 4 hours PRN Moderate Pain (4 - 6)  oxyCODONE    IR 10 milliGRAM(s) Oral every 4 hours PRN Severe Pain (7 - 10)  senna 2 Tablet(s) Oral at bedtime PRN Constipation  sodium chloride 0.65% Nasal 1 Spray(s) Both Nostrils every 6 hours PRN Nasal Congestion  tetracaine/benzocaine/butamben Spray 1 Spray(s) Topical every 3 hours PRN sore throat                            10.9   11.96 )-----------( 402      ( 26 Dec 2018 07:00 )             36.1       12-26    144  |  100  |  12  ----------------------------<  87  4.2   |  32<H>  |  0.99    Ca    9.4      26 Dec 2018 07:00  Phos  3.6     12-26  Mg     2.0     12-26        Physical Exam:  Gen: Laying in bed, NAD  Resp: Unlabored breathing  Abd: soft, NT, mildly distended, incision c/d/i   Ext: WWP  Skin: No rashes

## 2018-12-26 NOTE — CONSULT NOTE ADULT - SUBJECTIVE AND OBJECTIVE BOX
Chief Complaint:  Patient is a 63y old  Female who presents with a chief complaint of SBO (21 Dec 2018 09:47)    Diverticulosis  Cellulitis  CVA (Cerebral Vascular Accident)  RA (Rheumatoid Arthritis)  Tooth Abscess  Arthritis  Cigarette Smoker  Chronic Asthma  Adult Hypothyroidism  Borderline Diabetes Mellitus  High Cholesterol  H/O: HTN  Wrist Disorder  History of  Section     HPI:  63 year old woman with COPD on home O2 (3L), moderate pulmonary HTN, DM, RA on prednisone (10 mg daily x 30 years), HTN, s/p CVA (no residual deficit), s/p  x2 presents with abdominal pain for one day.  She developed mid abdominal cramping pain, sharp, last evening that has been constant and moderate to severe.  It was associated with multiple episodes of nonbloody, nonbilious emesis.  She had a normal bowel movement day of admission. While in the ER, a CT Abd showed SBO w/transition point in the lower abdomen w/associated interloop fluid and mesenteric edema. The patient was taken to the OR emergently on concern that these findings represented early bowel compromise. Intra-operatively she was found to have numerous adhesions, but the intestine itself only demonstrated an area of congestion and dilitation w/o evidence of ischemia, no transition point. No bowel was resected, and the abdomen was closed. GI consulted as pt continues with nausea and episodes of brown spitting up/vomiting. During exam the patient was noted to be spitting up brown fluid mixed w/foamy phlegm. She reports these episodes have been going on since post op and resolved up until her NGT was removed . She reports that she is willing to reattempt an NGT placement given no improvement of her symptoms. She has not had a bm since  and is passing minimal flatus. She has never had an EGD. Last colonoscopy was about 9 years ago and was for "screening reasons" and found to be "normal".       No Known Allergies      ALBUTerol    90 MICROgram(s) HFA Inhaler 1 Puff(s) Inhalation every 4 hours PRN  amLODIPine   Tablet 5 milliGRAM(s) Oral daily  benzocaine 15 mG/menthol 3.6 mG Lozenge 1 Lozenge Oral every 4 hours PRN  buDESOnide 160 MICROgram(s)/formoterol 4.5 MICROgram(s) Inhaler 2 Puff(s) Inhalation two times a day  chlorhexidine 4% Liquid 1 Application(s) Topical <User Schedule>  clopidogrel Tablet 75 milliGRAM(s) Oral daily  dextrose 50% Injectable 25 milliLiter(s) IV Push once  docusate sodium 100 milliGRAM(s) Oral three times a day PRN  famotidine  IVPB 20 milliGRAM(s) IV Intermittent daily  fluticasone propionate 50 MICROgram(s)/spray Nasal Spray 1 Spray(s) Both Nostrils two times a day  gabapentin 400 milliGRAM(s) Oral three times a day  glucagon  Injectable 1 milliGRAM(s) IntraMuscular once PRN  heparin  Injectable 5000 Unit(s) SubCutaneous every 8 hours  influenza   Vaccine 0.5 milliLiter(s) IntraMuscular once  insulin glargine Injectable (LANTUS) 9 Unit(s) SubCutaneous at bedtime  insulin lispro (HumaLOG) corrective regimen sliding scale   SubCutaneous every 6 hours  insulin lispro (HumaLOG) corrective regimen sliding scale   SubCutaneous at bedtime  insulin lispro Injectable (HumaLOG) 2 Unit(s) SubCutaneous three times a day before meals  levothyroxine 137 MICROGram(s) Oral daily  lidocaine 2% Gel 1 Application(s) Topical once  loratadine 10 milliGRAM(s) Oral once PRN  loratadine 10 milliGRAM(s) Oral daily  metoprolol succinate ER 50 milliGRAM(s) Oral daily  nitrofurantoin monohydrate/macrocrystals (MACROBID) 100 milliGRAM(s) Oral two times a day with meals  oxybutynin 5 milliGRAM(s) Oral two times a day  oxyCODONE    IR 5 milliGRAM(s) Oral every 4 hours PRN  oxyCODONE    IR 10 milliGRAM(s) Oral every 4 hours PRN  pantoprazole  Injectable 40 milliGRAM(s) IV Push two times a day  predniSONE   Tablet 15 milliGRAM(s) Oral daily  senna 2 Tablet(s) Oral at bedtime PRN  simvastatin 20 milliGRAM(s) Oral at bedtime  sodium chloride 0.65% Nasal 1 Spray(s) Both Nostrils every 6 hours PRN  sulfaSALAzine 500 milliGRAM(s) Oral two times a day  tetracaine/benzocaine/butamben Spray 1 Spray(s) Topical every 8 hours PRN  tiotropium 18 MICROgram(s) Capsule 1 Capsule(s) Inhalation daily  traMADol 75 milliGRAM(s) Oral every 6 hours        FAMILY HISTORY:  No pertinent family history in first degree relatives        Review of Systems:    General:  No wt loss, fevers, chills, night sweats, fatigue  Eyes:  Good vision, no reported pain  ENT:  No sore throat, pain, runny nose, dysphagia  CV:  No pain, palpitations, no lightheadedness  Resp:  No dyspnea, cough, tachypnea, wheezing  GI: mild nausea; spitting up brown/white foam; no bm since ; minimal flatus  :  No pain, bleeding, incontinence, nocturia  Muscle:  No pain, weakness  Neuro:  No weakness, tingling, memory problems  Psych:  No fatigue, insomnia, mood problems, depression  Endocrine:  No polyuria, polydypsia, cold/heat intolerance  Heme:  No petechiae, ecchymosis, easy bruisability  Skin:  No rash, tattoos, scars, edema    Relevant Family History:   n/c    Relevant Social History: n/c      Physical Exam:    Vital Signs:  Vital Signs Last 24 Hrs  T(C): 36.8 (26 Dec 2018 10:18), Max: 37.3 (26 Dec 2018 05:49)  T(F): 98.2 (26 Dec 2018 10:18), Max: 99.2 (26 Dec 2018 05:49)  HR: 91 (26 Dec 2018 12:53) (72 - 91)  BP: 152/95 (26 Dec 2018 12:53) (126/89 - 156/89)  BP(mean): --  RR: 18 (26 Dec 2018 10:18) (16 - 18)  SpO2: 100% (26 Dec 2018 10:18) (95% - 100%)  Daily     Daily     General:  Appears stated age, well-groomed, nad  HEENT:  NC/AT,  conjunctivae clear and pink, no thyromegaly, nodules, adenopathy, no JVD  Chest:  Full & symmetric excursion, no increased effort, breath sounds clear  Cardiovascular:  Regular rhythm, S1, S2, no murmur/rub/S3/S4, no abdominal bruit, no edema  Abdomen:  Soft, non-tender, non-distended, normoactive bowel sounds,  no masses ,no hepatosplenomeagaly, no signs of chronic liver disease  Extremities:  no cyanosis,clubbing or edema  Skin:  No rash/erythema/ecchymoses/petechiae/wounds/abscess/warm/dry  Neuro/Psych:  A&Ox3, no asterixis, no tremor, no encephalopathy    Laboratory:                            10.9   11.96 )-----------( 402      ( 26 Dec 2018 07:00 )             36.1         144  |  100  |  12  ----------------------------<  87  4.2   |  32<H>  |  0.99    Ca    9.4      26 Dec 2018 07:00  Phos  3.6       Mg     2.0                   Imaging:      < from: CT Abdomen and Pelvis w/ IV Cont (18 @ 13:57) >    EXAM:  CT ABDOMEN AND PELVIS IC      EXAM:  CT CHEST IC        PROCEDURE DATE:  Dec 21 2018         INTERPRETATION:  CLINICAL INFORMATION: Fever.  Small bowel obstruction,   status post exlap for lysis of adhesions.    COMPARISON: CT abdomen and pelvis 12/15/2018, 2018.    PROCEDURE:   CT of the Chest, Abdomen and Pelvis was performed with intravenous   contrast.   Intravenous contrast: 90 ml Omnipaque 350. 10 ml discarded.  Oral contrast: None.  Sagittal and coronal reformats were performed.    FINDINGS:    CHEST:     LUNGS AND LARGE AIRWAYS: The airways are unremarkable. There are a few   thin-walled cysts within the right lung. Subsegmental atelectasis in the   bilateral lower lobes.  PLEURA: No pleural effusion. No pneumothorax.  VESSELS: Within normal limits.  HEART: Heart size is normal. No pericardial effusion.  MEDIASTINUM AND KELIVN: No lymphadenopathy.   CHEST WALL AND LOWER NECK: The thyroid gland is not well-visualized and   may be surgically absent.    ABDOMEN AND PELVIS:    LIVER: Within normal limits.  BILE DUCTS: Normal caliber.  GALLBLADDER: Sludge within the gallbladder.  SPLEEN: Subcentimeter hypodense focus too small to characterize.  PANCREAS: Within normal limits.  ADRENALS: Within normal limits.  KIDNEYS/URETERS: Cortical scarring of the kidneys, which are also   atrophic. Multiple subcentimeter hypodense foci too small to characterize   bilaterally. No hydronephrosis. Punctate nonobstructing stones measuring   up to 2 mm on the right.    BLADDER: Smallfoci of air within the bladder, which may be secondary to   recent instrumentation.  REPRODUCTIVE ORGANS: Pedunculated uterine fibroid. No adnexal mass.    BOWEL: No bowel obstruction. Appendix is within normal limits. Colonic   diverticulosis without acute diverticulitis. Enteric feeding tube courses   through the esophagus with the tip terminating in the proximal stomach.   The tip of the catheter protrudes the lateral wall of the stomach with no   adjacent fatty stranding or extraluminal air. The esophagus demonstrates   mural thickening along most of its course, without significant change.    PERITONEUM: Small amount of fluid within the pelvis, which can be   postoperative.  VESSELS:  Atheromatous disease.  RETROPERITONEUM: No lymphadenopathy.    ABDOMINAL WALL: Surgical staples along the midline of the anterior   abdomen with postsurgical changes.  BONES: Chronic deformity of the right femoral head. Degenerative changes   spine.    IMPRESSION:   No acute abnormality in the chest.  Small free fluid, which can be postoperative. No discrete collection. No   bowel obstruction.              DAVID BURGESS M.D., RADIOLOGY RESIDENT  This document has been electronically signed.  CHAVA SERRANO M.D. ATTENDING RADIOLOGIST  This document has been electronically signed. Dec 21 2018  2:50PM                  < end of copied text >    ----    < from: CT Abdomen and Pelvis w/ IV Cont (12.15.18 @ 19:34) >    EXAM:  CT ABDOMEN AND PELVIS IC        PROCEDURE DATE:  Dec 15 2018         INTERPRETATION:  CLINICAL INFORMATION: Abdominal pain. Gastroenteritis   versus colitis suspected.     COMPARISON: CT of the abdomen and pelvis dated 2017.    PROCEDURE:   CT of the Abdomen and Pelvis was performed with intravenous contrast.   Intravenous contrast: 90 ml Omnipaque 350. 10 ml discarded.  Oral contrast: None.  Sagittal and coronal reformats were performed.    FINDINGS:    LOWER CHEST: No consolidation or pleural effusion. Mild dependent   atelectasis is seen at the lung bases.Small hiatal hernia.     LIVER: Too small to characterize right peripheral hepatic lobe   hypodensities.  BILE DUCTS: Normal caliber.  GALLBLADDER: Within normal limits.  SPLEEN: Within normal limits.  PANCREAS: Within normal limits.  ADRENALS: Within normal limits.  KIDNEYS/URETERS: Right renal atrophy. No hydronephrosis. Bilateral renal   cysts. Additional intrarenal hypodensities are too small to characterize.    BLADDER: Within normal limits.  REPRODUCTIVE ORGANS: Lobulated opacity abutting right wall of the uterus,   likely pedunculated fibroid. Consider correlation with pelvic ultrasound   if there is concern for adnexal lesion.    BOWEL: Diffusely dilated fluid filled small bowel loops with a transition   point in the lower abdomen (2, 83) consistent with small bowel   obstruction. Thickened jejunal folds with hyperemia at the level of the   transition point. Proximal jejunal folds are also thickened with   surrounding interloop fluid though detailed evaluation is limited   secondary to inadequate distention. Mild mesenteric edema and interloop   fluid surrounding distal jejunoileal folds with fecalization of the ileal   loops identified. Colonic diverticulosis without evidence of acute   diverticulitis. Appendix is normal.  PERITONEUM: Mild ascites.  VESSELS:  Atherosclerotic calcifications of abdominal aorta and branch   vessels. Celiac and SMA axes appears patent. LOIDA is not visualized.  RETROPERITONEUM: No lymphadenopathy.    ABDOMINAL WALL: Mild skin thickening at the level of the mons pubis.  BONES: Degenerative changes of the spine. Chronic appearing deformity of   right hip joint which appears subluxed superolaterally with advanced and   hypertrophic changes.    IMPRESSION:     Diffusely dilated fluid filled small bowel loops with a transition point   in the lower abdomen (2, 83) consistent with small bowel obstruction.   Thickened jejunal folds with hyperemia at the level of the transition   point. Proximal jejunal folds are also thickened with surrounding   interloop fluid though detailed evaluation is limited secondary to   inadequate distention. Mild mesenteric edema and interloop fluid   surrounding distal jejunoileal foldswith fecalization of the ileal loops   identified. Correlate with lactic acid to assess for vascular insult.   Decompression, surgical consultation and short-term imaging follow-up is   advised.    Additional findings as mentioned above.                  TESSIE ANGELA M.D., RADIOLOGY RESIDENT  This document has been electronically signed.  JOVANNI GUTHRIE M.D., ATTENDING RADIOLOGIST  This document has been electronically signed. Dec 15 2018  8:36PM                  < end of copied text >

## 2018-12-26 NOTE — CONSULT NOTE ADULT - ASSESSMENT
63 year old woman with COPD on home O2 (3L), moderate pulmonary HTN, DM, RA on prednisone (10 mg daily x 30 years), HTN, s/p CVA (no residual deficit), s/p  x2 presents with abdominal pain for one day found to have SBO on imaging now post-op with continued nausea/vomiting

## 2018-12-26 NOTE — PROGRESS NOTE ADULT - ATTENDING COMMENTS
Events from last night noticed. Passed flatus yesterday, none today.  Physical Exam: Afebrile. NG tube with copious output.  Plan: Reglan started as per Dr. Ashford's recommendation.

## 2018-12-26 NOTE — PROGRESS NOTE ADULT - PROBLEM SELECTOR PLAN 1
Resumed on NPO with IV fluids with dextrose.  - Agree with lower Lantus to 5 units at bedtime  -DC premeal Humalog  -Continue moderate correction scale q6h    - d/c likely on prandin, sitagliptin and likely lower dose tresiba pending FSG trends  -Follow up with Dr. Saul as outpatient.

## 2018-12-26 NOTE — PROGRESS NOTE ADULT - SUBJECTIVE AND OBJECTIVE BOX
Chief Complaint: DM2, hypothyroid    History: Patient with vomiting. Resumed NPO and NGT. Resumed on D51/2NS@50 cc/hour.    MEDICATIONS  (STANDING):  buDESOnide 160 MICROgram(s)/formoterol 4.5 MICROgram(s) Inhaler 2 Puff(s) Inhalation two times a day  cefTRIAXone   IVPB 1 Gram(s) IV Intermittent every 24 hours  clopidogrel Tablet 75 milliGRAM(s) Oral daily  dextrose 5% + sodium chloride 0.45% with potassium chloride 20 mEq/L 1000 milliLiter(s) (50 mL/Hr) IV Continuous <Continuous>  dextrose 50% Injectable 25 milliLiter(s) IV Push once  famotidine  IVPB 20 milliGRAM(s) IV Intermittent daily  fluticasone propionate 50 MICROgram(s)/spray Nasal Spray 1 Spray(s) Both Nostrils two times a day  heparin  Injectable 5000 Unit(s) SubCutaneous every 8 hours  influenza   Vaccine 0.5 milliLiter(s) IntraMuscular once  insulin glargine Injectable (LANTUS) 5 Unit(s) SubCutaneous at bedtime  insulin lispro (HumaLOG) corrective regimen sliding scale   SubCutaneous every 6 hours  insulin lispro (HumaLOG) corrective regimen sliding scale   SubCutaneous at bedtime  levothyroxine Injectable 103 MICROGram(s) IV Push at bedtime  methylPREDNISolone sodium succinate Injectable 8 milliGRAM(s) IV Push every 12 hours  metoclopramide Injectable 10 milliGRAM(s) IV Push every 8 hours  metoprolol tartrate Injectable 5 milliGRAM(s) IV Push every 6 hours  pantoprazole  Injectable 40 milliGRAM(s) IV Push two times a day  tiotropium 18 MICROgram(s) Capsule 1 Capsule(s) Inhalation daily    MEDICATIONS  (PRN):  ALBUTerol    90 MICROgram(s) HFA Inhaler 1 Puff(s) Inhalation every 4 hours PRN Shortness of Breath and/or Wheezing  benzocaine 15 mG/menthol 3.6 mG Lozenge 1 Lozenge Oral every 4 hours PRN Sore Throat  glucagon  Injectable 1 milliGRAM(s) IntraMuscular once PRN Glucose LESS THAN 70 milligrams/deciliter  HYDROmorphone  Injectable 0.5 milliGRAM(s) IV Push every 3 hours PRN Moderate Pain (4 - 6)  HYDROmorphone  Injectable 1 milliGRAM(s) IV Push every 4 hours PRN Severe Pain (7 - 10)  sodium chloride 0.65% Nasal 1 Spray(s) Both Nostrils every 6 hours PRN Nasal Congestion  tetracaine/benzocaine/butamben Spray 1 Spray(s) Topical every 8 hours PRN NGT      Allergies    No Known Allergies    Intolerances      Review of Systems:  ALL OTHER SYSTEMS REVIEWED AND NEGATIVE      PHYSICAL EXAM:  VITALS: T(C): 37.2 (12-26-18 @ 13:57)  T(F): 98.9 (12-26-18 @ 13:57), Max: 99.2 (12-26-18 @ 05:49)  HR: 95 (12-26-18 @ 13:57) (72 - 95)  BP: 129/83 (12-26-18 @ 13:57) (126/89 - 152/95)  RR:  (16 - 18)  SpO2:  (95% - 100%)  Wt(kg): --  GENERAL: NAD, well-groomed, well-developed  EYES: No proptosis, no lid lag, anicteric  HEENT:  Atraumatic, Normocephalic, NGT in place  NEURO: extraocular movements intact, no tremor  PSYCH: Alert and oriented x 3, normal affect, normal mood      CAPILLARY BLOOD GLUCOSE      POCT Blood Glucose.: 92 mg/dL (26 Dec 2018 12:42)  POCT Blood Glucose.: 101 mg/dL (26 Dec 2018 07:46)  POCT Blood Glucose.: 102 mg/dL (25 Dec 2018 22:13)  POCT Blood Glucose.: 113 mg/dL (25 Dec 2018 17:57)      12-26    144  |  100  |  12  ----------------------------<  87  4.2   |  32<H>  |  0.99    EGFR if : 71  EGFR if non : 62    Ca    9.4      12-26  Mg     2.0     12-26  Phos  3.6     12-26            Thyroid Function Tests:  12-20 @ 06:45 TSH 40.15 FreeT4 0.98 T3 -- Anti TPO -- Anti Thyroglobulin Ab -- TSI --      Hemoglobin A1C, Whole Blood: 7.1 % <H> [4.0 - 5.6] (12-17-18 @ 03:50)  Hemoglobin A1C, Whole Blood: 7.3 % <H> [4.0 - 5.6] (12-16-18 @ 04:17)

## 2018-12-27 LAB
BUN SERPL-MCNC: 12 MG/DL — SIGNIFICANT CHANGE UP (ref 7–23)
CALCIUM SERPL-MCNC: 8.7 MG/DL — SIGNIFICANT CHANGE UP (ref 8.4–10.5)
CHLORIDE SERPL-SCNC: 103 MMOL/L — SIGNIFICANT CHANGE UP (ref 98–107)
CO2 SERPL-SCNC: 32 MMOL/L — HIGH (ref 22–31)
CREAT SERPL-MCNC: 1.05 MG/DL — SIGNIFICANT CHANGE UP (ref 0.5–1.3)
GLUCOSE BLDC GLUCOMTR-MCNC: 121 MG/DL — HIGH (ref 70–99)
GLUCOSE BLDC GLUCOMTR-MCNC: 139 MG/DL — HIGH (ref 70–99)
GLUCOSE BLDC GLUCOMTR-MCNC: 157 MG/DL — HIGH (ref 70–99)
GLUCOSE BLDC GLUCOMTR-MCNC: 177 MG/DL — HIGH (ref 70–99)
GLUCOSE BLDC GLUCOMTR-MCNC: 184 MG/DL — HIGH (ref 70–99)
GLUCOSE SERPL-MCNC: 119 MG/DL — HIGH (ref 70–99)
HCT VFR BLD CALC: 31.4 % — LOW (ref 34.5–45)
HGB BLD-MCNC: 9.6 G/DL — LOW (ref 11.5–15.5)
MAGNESIUM SERPL-MCNC: 1.9 MG/DL — SIGNIFICANT CHANGE UP (ref 1.6–2.6)
MCHC RBC-ENTMCNC: 30.6 % — LOW (ref 32–36)
MCHC RBC-ENTMCNC: 31.5 PG — SIGNIFICANT CHANGE UP (ref 27–34)
MCV RBC AUTO: 103 FL — HIGH (ref 80–100)
NRBC # FLD: 0 — SIGNIFICANT CHANGE UP
PHOSPHATE SERPL-MCNC: 3.6 MG/DL — SIGNIFICANT CHANGE UP (ref 2.5–4.5)
PLATELET # BLD AUTO: 359 K/UL — SIGNIFICANT CHANGE UP (ref 150–400)
PMV BLD: 9 FL — SIGNIFICANT CHANGE UP (ref 7–13)
POTASSIUM SERPL-MCNC: 4.8 MMOL/L — SIGNIFICANT CHANGE UP (ref 3.5–5.3)
POTASSIUM SERPL-SCNC: 4.8 MMOL/L — SIGNIFICANT CHANGE UP (ref 3.5–5.3)
RBC # BLD: 3.05 M/UL — LOW (ref 3.8–5.2)
RBC # FLD: 14.5 % — SIGNIFICANT CHANGE UP (ref 10.3–14.5)
SODIUM SERPL-SCNC: 145 MMOL/L — SIGNIFICANT CHANGE UP (ref 135–145)
T4 FREE SERPL-MCNC: 1.18 NG/DL — SIGNIFICANT CHANGE UP (ref 0.9–1.8)
WBC # BLD: 17.84 K/UL — HIGH (ref 3.8–10.5)
WBC # FLD AUTO: 17.84 K/UL — HIGH (ref 3.8–10.5)

## 2018-12-27 PROCEDURE — 99223 1ST HOSP IP/OBS HIGH 75: CPT | Mod: GC

## 2018-12-27 PROCEDURE — 99232 SBSQ HOSP IP/OBS MODERATE 35: CPT

## 2018-12-27 RX ORDER — DEXTROSE MONOHYDRATE, SODIUM CHLORIDE, AND POTASSIUM CHLORIDE 50; .745; 4.5 G/1000ML; G/1000ML; G/1000ML
1000 INJECTION, SOLUTION INTRAVENOUS
Qty: 0 | Refills: 0 | Status: DISCONTINUED | OUTPATIENT
Start: 2018-12-27 | End: 2018-12-30

## 2018-12-27 RX ORDER — HYDROMORPHONE HYDROCHLORIDE 2 MG/ML
1 INJECTION INTRAMUSCULAR; INTRAVENOUS; SUBCUTANEOUS ONCE
Qty: 0 | Refills: 0 | Status: DISCONTINUED | OUTPATIENT
Start: 2018-12-27 | End: 2018-12-27

## 2018-12-27 RX ORDER — INSULIN GLARGINE 100 [IU]/ML
8 INJECTION, SOLUTION SUBCUTANEOUS AT BEDTIME
Qty: 0 | Refills: 0 | Status: DISCONTINUED | OUTPATIENT
Start: 2018-12-27 | End: 2018-12-28

## 2018-12-27 RX ORDER — ACETAMINOPHEN 500 MG
1000 TABLET ORAL ONCE
Qty: 0 | Refills: 0 | Status: COMPLETED | OUTPATIENT
Start: 2018-12-27 | End: 2018-12-27

## 2018-12-27 RX ADMIN — BENZOCAINE AND MENTHOL 1 LOZENGE: 5; 1 LIQUID ORAL at 06:41

## 2018-12-27 RX ADMIN — Medication 1000 MILLIGRAM(S): at 19:56

## 2018-12-27 RX ADMIN — INSULIN GLARGINE 8 UNIT(S): 100 INJECTION, SOLUTION SUBCUTANEOUS at 21:45

## 2018-12-27 RX ADMIN — Medication 5 MILLIGRAM(S): at 18:29

## 2018-12-27 RX ADMIN — BENZOCAINE AND MENTHOL 1 LOZENGE: 5; 1 LIQUID ORAL at 18:28

## 2018-12-27 RX ADMIN — Medication 8 MILLIGRAM(S): at 18:29

## 2018-12-27 RX ADMIN — HYDROMORPHONE HYDROCHLORIDE 1 MILLIGRAM(S): 2 INJECTION INTRAMUSCULAR; INTRAVENOUS; SUBCUTANEOUS at 11:25

## 2018-12-27 RX ADMIN — Medication 103 MICROGRAM(S): at 21:45

## 2018-12-27 RX ADMIN — HYDROMORPHONE HYDROCHLORIDE 1 MILLIGRAM(S): 2 INJECTION INTRAMUSCULAR; INTRAVENOUS; SUBCUTANEOUS at 15:23

## 2018-12-27 RX ADMIN — Medication 2: at 18:28

## 2018-12-27 RX ADMIN — Medication 5 MILLIGRAM(S): at 11:07

## 2018-12-27 RX ADMIN — HYDROMORPHONE HYDROCHLORIDE 1 MILLIGRAM(S): 2 INJECTION INTRAMUSCULAR; INTRAVENOUS; SUBCUTANEOUS at 15:38

## 2018-12-27 RX ADMIN — Medication 10 MILLIGRAM(S): at 21:45

## 2018-12-27 RX ADMIN — Medication 2: at 12:04

## 2018-12-27 RX ADMIN — HYDROMORPHONE HYDROCHLORIDE 1 MILLIGRAM(S): 2 INJECTION INTRAMUSCULAR; INTRAVENOUS; SUBCUTANEOUS at 11:08

## 2018-12-27 RX ADMIN — HYDROMORPHONE HYDROCHLORIDE 1 MILLIGRAM(S): 2 INJECTION INTRAMUSCULAR; INTRAVENOUS; SUBCUTANEOUS at 22:30

## 2018-12-27 RX ADMIN — PANTOPRAZOLE SODIUM 40 MILLIGRAM(S): 20 TABLET, DELAYED RELEASE ORAL at 18:29

## 2018-12-27 RX ADMIN — BENZOCAINE AND MENTHOL 1 LOZENGE: 5; 1 LIQUID ORAL at 12:05

## 2018-12-27 RX ADMIN — Medication 5 MILLIGRAM(S): at 06:16

## 2018-12-27 RX ADMIN — BENZOCAINE AND MENTHOL 1 LOZENGE: 5; 1 LIQUID ORAL at 22:16

## 2018-12-27 RX ADMIN — DEXTROSE MONOHYDRATE, SODIUM CHLORIDE, AND POTASSIUM CHLORIDE 100 MILLILITER(S): 50; .745; 4.5 INJECTION, SOLUTION INTRAVENOUS at 14:34

## 2018-12-27 RX ADMIN — Medication 1000 MILLIGRAM(S): at 12:35

## 2018-12-27 RX ADMIN — Medication 400 MILLIGRAM(S): at 12:05

## 2018-12-27 RX ADMIN — Medication 10 MILLIGRAM(S): at 06:20

## 2018-12-27 RX ADMIN — Medication 2: at 00:03

## 2018-12-27 RX ADMIN — Medication 10 MILLIGRAM(S): at 14:34

## 2018-12-27 RX ADMIN — PANTOPRAZOLE SODIUM 40 MILLIGRAM(S): 20 TABLET, DELAYED RELEASE ORAL at 06:16

## 2018-12-27 RX ADMIN — Medication 400 MILLIGRAM(S): at 19:26

## 2018-12-27 RX ADMIN — HYDROMORPHONE HYDROCHLORIDE 1 MILLIGRAM(S): 2 INJECTION INTRAMUSCULAR; INTRAVENOUS; SUBCUTANEOUS at 19:42

## 2018-12-27 RX ADMIN — HYDROMORPHONE HYDROCHLORIDE 1 MILLIGRAM(S): 2 INJECTION INTRAMUSCULAR; INTRAVENOUS; SUBCUTANEOUS at 07:01

## 2018-12-27 RX ADMIN — CEFTRIAXONE 100 GRAM(S): 500 INJECTION, POWDER, FOR SOLUTION INTRAMUSCULAR; INTRAVENOUS at 18:29

## 2018-12-27 RX ADMIN — BUDESONIDE AND FORMOTEROL FUMARATE DIHYDRATE 2 PUFF(S): 160; 4.5 AEROSOL RESPIRATORY (INHALATION) at 21:45

## 2018-12-27 RX ADMIN — Medication 8 MILLIGRAM(S): at 06:17

## 2018-12-27 RX ADMIN — HYDROMORPHONE HYDROCHLORIDE 1 MILLIGRAM(S): 2 INJECTION INTRAMUSCULAR; INTRAVENOUS; SUBCUTANEOUS at 19:27

## 2018-12-27 RX ADMIN — HYDROMORPHONE HYDROCHLORIDE 1 MILLIGRAM(S): 2 INJECTION INTRAMUSCULAR; INTRAVENOUS; SUBCUTANEOUS at 22:16

## 2018-12-27 RX ADMIN — HYDROMORPHONE HYDROCHLORIDE 1 MILLIGRAM(S): 2 INJECTION INTRAMUSCULAR; INTRAVENOUS; SUBCUTANEOUS at 06:31

## 2018-12-27 RX ADMIN — FAMOTIDINE 104 MILLIGRAM(S): 10 INJECTION INTRAVENOUS at 11:07

## 2018-12-27 NOTE — CONSULT NOTE ADULT - ASSESSMENT
Pt is a 63F with PMHx rheumatoid arthritis (on chronic Prednisone 10mg QD x30 years), CHRIS/OHS on Bilevel, former smoker with COPD and pulmHTN (WHO I/III) who presents to [facility] 12/15 with acute onset abdominal pain and NBNB vomiting 2/2 SBO s/p emergent exploratory laparotomy with lysis of adhesions (12/16) c/b persistent episodes of nausea/vomiting. Pulmonary consulted for management of pulmonary co-morbidities while in hospital. Pt currently comfortable and in no respiratory distress.   -Agree with current inhaler therapy: Symbicort 160-4.5 mcg 2 puffs BID and Spiriva 18mcg qhs. Can c/w Albuterol inhaler prn. No indication for escalation of therapy.  -c/w Prednisone (or IV equivalent) at pt's home dose 10mg QD or as per primary surgical team  -Will hold home Bilevel NIV qhs for now while NGT is in place. Will need to restart after NGT removed or if symptoms return  -Okay to hold Lasix as per primary team as pt does not currently show evidence of pulmonary edema. Will continue to monitor clinically  -Pulmonary will continue to follow

## 2018-12-27 NOTE — PROGRESS NOTE ADULT - PROBLEM SELECTOR PLAN 1
Continued on NPO with IV fluids with dextrose, rate to be increased.  - Increase Lantus to 8 units at bedtime  -Continue moderate correction scale q6h    - d/c likely on prandin, sitagliptin and likely lower dose tresiba pending FSG trends  -Follow up with Dr. Saul as outpatient.

## 2018-12-27 NOTE — PROGRESS NOTE ADULT - SUBJECTIVE AND OBJECTIVE BOX
Surgery Progress Note    S: Patient seen and examined. No acute events overnight. Pain well controlled. NGT in place, with bilious output.     O:  Vital Signs Last 24 Hrs  T(C): 36.7 (27 Dec 2018 09:53), Max: 37.2 (26 Dec 2018 13:57)  T(F): 98.1 (27 Dec 2018 09:53), Max: 98.9 (26 Dec 2018 13:57)  HR: 78 (27 Dec 2018 09:53) (78 - 97)  BP: 105/68 (27 Dec 2018 09:53) (105/68 - 152/95)  BP(mean): --  RR: 16 (27 Dec 2018 09:53) (16 - 18)  SpO2: 97% (27 Dec 2018 09:53) (97% - 100%)    I&O's Detail    26 Dec 2018 07:01  -  27 Dec 2018 07:00  --------------------------------------------------------  IN:    dextrose 5% + sodium chloride 0.45% with potassium chloride 20 mEq/L: 800 mL    IV PiggyBack: 50 mL  Total IN: 850 mL    OUT:    Nasoenteral Tube: 1900 mL    Voided: 300 mL  Total OUT: 2200 mL    Total NET: -1350 mL      27 Dec 2018 07:01  -  27 Dec 2018 10:39  --------------------------------------------------------  IN:  Total IN: 0 mL    OUT:  Total OUT: 0 mL    Total NET: 0 mL          MEDICATIONS  (STANDING):  buDESOnide 160 MICROgram(s)/formoterol 4.5 MICROgram(s) Inhaler 2 Puff(s) Inhalation two times a day  cefTRIAXone   IVPB 1 Gram(s) IV Intermittent every 24 hours  clopidogrel Tablet 75 milliGRAM(s) Oral daily  dextrose 5% + sodium chloride 0.45% with potassium chloride 20 mEq/L 1000 milliLiter(s) (50 mL/Hr) IV Continuous <Continuous>  dextrose 50% Injectable 25 milliLiter(s) IV Push once  famotidine  IVPB 20 milliGRAM(s) IV Intermittent daily  fluticasone propionate 50 MICROgram(s)/spray Nasal Spray 1 Spray(s) Both Nostrils two times a day  heparin  Injectable 5000 Unit(s) SubCutaneous every 8 hours  influenza   Vaccine 0.5 milliLiter(s) IntraMuscular once  insulin glargine Injectable (LANTUS) 5 Unit(s) SubCutaneous at bedtime  insulin lispro (HumaLOG) corrective regimen sliding scale   SubCutaneous every 6 hours  levothyroxine Injectable 103 MICROGram(s) IV Push at bedtime  methylPREDNISolone sodium succinate Injectable 8 milliGRAM(s) IV Push every 12 hours  metoclopramide Injectable 10 milliGRAM(s) IV Push every 8 hours  metoprolol tartrate Injectable 5 milliGRAM(s) IV Push every 6 hours  pantoprazole  Injectable 40 milliGRAM(s) IV Push two times a day  tiotropium 18 MICROgram(s) Capsule 1 Capsule(s) Inhalation daily    MEDICATIONS  (PRN):  ALBUTerol    90 MICROgram(s) HFA Inhaler 1 Puff(s) Inhalation every 4 hours PRN Shortness of Breath and/or Wheezing  benzocaine 15 mG/menthol 3.6 mG Lozenge 1 Lozenge Oral every 4 hours PRN Sore Throat  glucagon  Injectable 1 milliGRAM(s) IntraMuscular once PRN Glucose LESS THAN 70 milligrams/deciliter  HYDROmorphone  Injectable 0.5 milliGRAM(s) IV Push every 3 hours PRN Moderate Pain (4 - 6)  HYDROmorphone  Injectable 1 milliGRAM(s) IV Push every 4 hours PRN Severe Pain (7 - 10)  sodium chloride 0.65% Nasal 1 Spray(s) Both Nostrils every 6 hours PRN Nasal Congestion  tetracaine/benzocaine/butamben Spray 1 Spray(s) Topical every 8 hours PRN NGT                            9.6    17.84 )-----------( 359      ( 27 Dec 2018 07:15 )             31.4       12-27    145  |  103  |  12  ----------------------------<  119<H>  4.8   |  32<H>  |  1.05    Ca    8.7      27 Dec 2018 07:15  Phos  3.6     12-27  Mg     1.9     12-27        Physical Exam:  Gen: Laying in bed, NAD  Resp: Unlabored breathing  Abd: soft, mild TTP, mildly distended ,incision with some serosanguinous drainage   Ext: WWP  Skin: No rashes

## 2018-12-27 NOTE — CONSULT NOTE ADULT - ATTENDING COMMENTS
I have seen and examined the patient. I agree with the above history, physical exam, and plan of care except for as detailed below.    62 y/o morbidly obese F w/chronic hypercapnic respiratory failure secondary to CHRIS/OHS, COPD, RA on chronic steroids admitted for small bowel obstruction s/p ex-lap. Currently doing well from a respiratory standpoint. No evidence of COPD exacerbation.     - Continue supplemental O2  - Continue symbicort, spiriva, albuterol  - Current steroids for RA, no indication for increased steroids for COPD  - Incentive spirometry, out of bed as tolerated  - Resume home BiPAP once NGT is removed

## 2018-12-27 NOTE — PROGRESS NOTE ADULT - SUBJECTIVE AND OBJECTIVE BOX
INTERVAL HPI/OVERNIGHT EVENTS:    NGT with bilious output noted this morning  has abdominal pain, pointing to the right side of the sutures  no bm, no n/v    MEDICATIONS  (STANDING):  buDESOnide 160 MICROgram(s)/formoterol 4.5 MICROgram(s) Inhaler 2 Puff(s) Inhalation two times a day  cefTRIAXone   IVPB 1 Gram(s) IV Intermittent every 24 hours  clopidogrel Tablet 75 milliGRAM(s) Oral daily  dextrose 5% + sodium chloride 0.45% with potassium chloride 20 mEq/L 1000 milliLiter(s) (100 mL/Hr) IV Continuous <Continuous>  dextrose 50% Injectable 25 milliLiter(s) IV Push once  famotidine  IVPB 20 milliGRAM(s) IV Intermittent daily  fluticasone propionate 50 MICROgram(s)/spray Nasal Spray 1 Spray(s) Both Nostrils two times a day  heparin  Injectable 5000 Unit(s) SubCutaneous every 8 hours  influenza   Vaccine 0.5 milliLiter(s) IntraMuscular once  insulin glargine Injectable (LANTUS) 5 Unit(s) SubCutaneous at bedtime  insulin lispro (HumaLOG) corrective regimen sliding scale   SubCutaneous every 6 hours  levothyroxine Injectable 103 MICROGram(s) IV Push at bedtime  methylPREDNISolone sodium succinate Injectable 8 milliGRAM(s) IV Push every 12 hours  metoclopramide Injectable 10 milliGRAM(s) IV Push every 8 hours  metoprolol tartrate Injectable 5 milliGRAM(s) IV Push every 6 hours  pantoprazole  Injectable 40 milliGRAM(s) IV Push two times a day  tiotropium 18 MICROgram(s) Capsule 1 Capsule(s) Inhalation daily    MEDICATIONS  (PRN):  ALBUTerol    90 MICROgram(s) HFA Inhaler 1 Puff(s) Inhalation every 4 hours PRN Shortness of Breath and/or Wheezing  benzocaine 15 mG/menthol 3.6 mG Lozenge 1 Lozenge Oral every 4 hours PRN Sore Throat  glucagon  Injectable 1 milliGRAM(s) IntraMuscular once PRN Glucose LESS THAN 70 milligrams/deciliter  HYDROmorphone  Injectable 0.5 milliGRAM(s) IV Push every 3 hours PRN Moderate Pain (4 - 6)  HYDROmorphone  Injectable 1 milliGRAM(s) IV Push every 4 hours PRN Severe Pain (7 - 10)  sodium chloride 0.65% Nasal 1 Spray(s) Both Nostrils every 6 hours PRN Nasal Congestion  tetracaine/benzocaine/butamben Spray 1 Spray(s) Topical every 8 hours PRN NGT      Allergies    No Known Allergies    Intolerances        Review of Systems:    General:  No wt loss, fevers, chills, night sweats, fatigue   Eyes:  Good vision, no reported pain  ENT:  No sore throat, pain, runny nose, dysphagia  CV:  No pain, palpitations, hypo/hypertension  Resp:  No dyspnea, cough, tachypnea, wheezing  GI:  No pain, No nausea, No vomiting, No diarrhea, No constipation, No weight loss, No fever, No pruritis, No rectal bleeding, No melena, No dysphagia  :  No pain, bleeding, incontinence, nocturia  Muscle:  No pain, weakness  Neuro:  No weakness, tingling, memory problems  Psych:  No fatigue, insomnia, mood problems, depression  Endocrine:  No polyuria, polydypsia, cold/heat intolerance  Heme:  No petechiae, ecchymosis, easy bruisability  Skin:  No rash, tattoos, scars, edema      Vital Signs Last 24 Hrs  T(C): 36.7 (27 Dec 2018 09:53), Max: 37.2 (26 Dec 2018 13:57)  T(F): 98.1 (27 Dec 2018 09:53), Max: 98.9 (26 Dec 2018 13:57)  HR: 79 (27 Dec 2018 11:20) (78 - 97)  BP: 130/80 (27 Dec 2018 11:20) (105/68 - 146/83)  BP(mean): --  RR: 16 (27 Dec 2018 09:53) (16 - 18)  SpO2: 97% (27 Dec 2018 09:53) (97% - 100%)    PHYSICAL EXAM:    Constitutional: NAD  HEENT: EOMI, throat clear  Neck: No LAD, supple  Respiratory: CTA and P  Cardiovascular: S1 and S2, RRR, no M  Gastrointestinal: BS+, soft, NT/ND, neg HSM, +incision with some blood noted on gauze  Extremities: No peripheral edema, neg clubbing, cyanosis  Vascular: 2+ peripheral pulses  Neurological: A/O x 3, no focal deficits  Psychiatric: Normal mood, normal affect  Skin: No rashes      LABS:                        9.6    17.84 )-----------( 359      ( 27 Dec 2018 07:15 )             31.4     12-27    145  |  103  |  12  ----------------------------<  119<H>  4.8   |  32<H>  |  1.05    Ca    8.7      27 Dec 2018 07:15  Phos  3.6     12-27  Mg     1.9     12-27            RADIOLOGY & ADDITIONAL TESTS:

## 2018-12-27 NOTE — PROGRESS NOTE ADULT - ATTENDING COMMENTS
Above noted. passed flatus today, no bowel movement. Continues to have copious NG tube output.  The etiology of  the current symptoms remains unclear.  Plan: Continue present management. Clamp NG tube suction; reconnect if she develops abdominal pain or nausea.

## 2018-12-27 NOTE — CONSULT NOTE ADULT - SUBJECTIVE AND OBJECTIVE BOX
CHIEF COMPLAINT: Abdominal Pain    HPI: Pt is a 63F with PMHx rheumatoid arthritis (on chronic Prednisone 10mg QD x30 years), CHRIS/OHS on Bilevel, former smoker with COPD and pulmHTN (WHO I/III) who presents to [facility] 12/15 with acute onset abdominal pain and NBNB vomiting 2/2 SBO s/p emergent exploratory labartomy with lysis of adhesions () c/b persistent episodes of nausea/vomiting. Pulmonary consulted for management of pulmonary co-morbidities while in hospital.     Today, pt has no respiratory complaints and says that her breathing is relatively stable. She has had intermittent episodes of wheezing while here, but has been able to control it with current inhalers. Pt more concerned with her abdominal discomfort and NBNB, as well as the NGT that was placed. She said that she had some bleeding from her nose after the NGT was placed last night which resulted in chest congestion, but is now clearing up. She is also concerned with her pain management while in hospital, as she is experiencing neuropathic pain while now being on Gabapentin and rhematoid arthritis pain from not receiving Tramadol.     PAST MEDICAL & SURGICAL HISTORY:  Diverticulosis  CVA (Cerebral Vascular Accident)  RA (Rheumatoid Arthritis)  Tooth Abscess  Arthritis  Cigarette Smoker  Chronic Asthma  Adult Hypothyroidism  Borderline Diabetes Mellitus  High Cholesterol  H/O: HTN  Wrist Disorder: S/P Surgery  History of  Section      FAMILY HISTORY:  No pertinent family history in first degree relatives    Allergies    No Known Allergies    Intolerances        HOME MEDICATIONS:    REVIEW OF SYSTEMS:  Constitutional: [x ] negative [ ] fevers [ ] chills [ ] weight loss [ ] weight gain  HEENT: [x ] negative [ ] dry eyes [ ] eye irritation [ ] postnasal drip [ ] nasal congestion  CV: [ x] negative  [ ] chest pain [ ] orthopnea [ ] palpitations [ ] murmur  Resp: [x ] negative [ ] cough [ ] shortness of breath [ ] dyspnea [ ] wheezing [ ] sputum [ ] hemoptysis  GI: [ ] negative [x ] nausea [x ] vomiting [ ] diarrhea [ ] constipation [ ] abd pain [ ] dysphagia   : [ ] negative [ ] dysuria [ ] nocturia [ ] hematuria [ ] increased urinary frequency  Musculoskeletal: [x ] negative [ ] back pain [ ] myalgias [ ] arthralgias [ ] fracture  Skin: [x ] negative [ ] rash [ ] itch  Neurological: [x ] negative [ ] headache [ ] dizziness [ ] syncope [ ] weakness [ ] numbness  Psychiatric: [ ] negative [ ] anxiety [ ] depression  Endocrine: [ ] negative [ ] diabetes [ ] thyroid problem  Hematologic/Lymphatic: [ ] negative [ ] anemia [ ] bleeding problem  Allergic/Immunologic: [ ] negative [ ] itchy eyes [ ] nasal discharge [ ] hives [ ] angioedema  [x ] All other systems negative  [ ] Unable to assess ROS because ________    OBJECTIVE:  ICU Vital Signs Last 24 Hrs  T(C): 37 (27 Dec 2018 14:04), Max: 37.2 (27 Dec 2018 02:29)  T(F): 98.6 (27 Dec 2018 14:04), Max: 98.9 (27 Dec 2018 02:29)  HR: 82 (27 Dec 2018 15:38) (78 - 97)  BP: 120/78 (27 Dec 2018 15:38) (105/68 - 146/83)  BP(mean): --  ABP: --  ABP(mean): --  RR: 17 (27 Dec 2018 14:04) (16 - 18)  SpO2: 97% (27 Dec 2018 14:04) (97% - 100%)         @ 07:  -   @ 07:00  --------------------------------------------------------  IN: 850 mL / OUT: 2200 mL / NET: -1350 mL     @ 07: @ 16:52  --------------------------------------------------------  IN: 0 mL / OUT: 200 mL / NET: -200 mL      CAPILLARY BLOOD GLUCOSE      POCT Blood Glucose.: 184 mg/dL (27 Dec 2018 11:54)      PHYSICAL EXAM:  General: NAD, appears comfortably on NC  HEENT: NCAT, MOM, KWAME/EOMI  Neck: Supple  Respiratory: CTA b/l, (-) rhonchi/wheezing/rales  Cardiovascular: RRR, S1/S2, (-) m/g/r, (-) pitting edema  Extremities: (-) clubbing/cyanosis/edema  Skin: c/d/i. NGT in place   Neurological: No new focal findings  Psychiatry: Appropriate    HOSPITAL MEDICATIONS:  clopidogrel Tablet 75 milliGRAM(s) Oral daily  heparin  Injectable 5000 Unit(s) SubCutaneous every 8 hours    cefTRIAXone   IVPB 1 Gram(s) IV Intermittent every 24 hours    metoprolol tartrate Injectable 5 milliGRAM(s) IV Push every 6 hours    dextrose 50% Injectable 25 milliLiter(s) IV Push once  glucagon  Injectable 1 milliGRAM(s) IntraMuscular once PRN  insulin glargine Injectable (LANTUS) 8 Unit(s) SubCutaneous at bedtime  insulin lispro (HumaLOG) corrective regimen sliding scale   SubCutaneous every 6 hours  levothyroxine Injectable 103 MICROGram(s) IV Push at bedtime  methylPREDNISolone sodium succinate Injectable 8 milliGRAM(s) IV Push every 12 hours    ALBUTerol    90 MICROgram(s) HFA Inhaler 1 Puff(s) Inhalation every 4 hours PRN  buDESOnide 160 MICROgram(s)/formoterol 4.5 MICROgram(s) Inhaler 2 Puff(s) Inhalation two times a day  tiotropium 18 MICROgram(s) Capsule 1 Capsule(s) Inhalation daily    HYDROmorphone  Injectable 0.5 milliGRAM(s) IV Push every 3 hours PRN  HYDROmorphone  Injectable 1 milliGRAM(s) IV Push every 4 hours PRN  metoclopramide Injectable 10 milliGRAM(s) IV Push every 8 hours    famotidine  IVPB 20 milliGRAM(s) IV Intermittent daily  pantoprazole  Injectable 40 milliGRAM(s) IV Push two times a day        dextrose 5% + sodium chloride 0.45% with potassium chloride 20 mEq/L 1000 milliLiter(s) IV Continuous <Continuous>    influenza   Vaccine 0.5 milliLiter(s) IntraMuscular once    benzocaine 15 mG/menthol 3.6 mG Lozenge 1 Lozenge Oral every 4 hours PRN  fluticasone propionate 50 MICROgram(s)/spray Nasal Spray 1 Spray(s) Both Nostrils two times a day  sodium chloride 0.65% Nasal 1 Spray(s) Both Nostrils every 6 hours PRN  tetracaine/benzocaine/butamben Spray 1 Spray(s) Topical every 8 hours PRN        LABS:                        9.6    17.84 )-----------( 359      ( 27 Dec 2018 07:15 )             31.4     Hgb Trend: 9.6<--, 10.9<--, 10.2<--, 10.2<--, 10.0<--  12    145  |  103  |  12  ----------------------------<  119<H>  4.8   |  32<H>  |  1.05    Ca    8.7      27 Dec 2018 07:15  Phos  3.6       Mg     1.9           Creatinine Trend: 1.05<--, 0.99<--, 0.95<--, 1.04<--, 0.87<--, 0.98<--            MICROBIOLOGY: Reviewed    RADIOLOGY:  [x ] Reviewed and interpreted by me    PULMONARY FUNCTION TESTS: None    EKG: Reviewed

## 2018-12-27 NOTE — PROGRESS NOTE ADULT - ASSESSMENT
63F w/ multiple respiratory comorbidities p/w SBO s/p ex-lap with findings of healthy bowel.    - PPI BID   - Continue to monitor respiratory status in the setting of holding lasix  - DVT ppx  - encourage OOB   - monitor UOP     Fatoumata Danielle, PGY-1  [facility] [phone]

## 2018-12-27 NOTE — PROGRESS NOTE ADULT - PROBLEM SELECTOR PLAN 1
- noted on CT Abd on admission with repeat imaging showing resolution  - trend labs  - IVF   - recommend Protonix 40mg IVP BID until nausea resolves, then change to PO  - zofran prn   - monitor for GI function   - NPO with NGT to lws  - cont surgery recs - noted on CT Abd on admission with repeat imaging showing resolution  - trend labs  - IVF   - recommend Protonix 40mg IVP BID until nausea resolves, then change to PO  - zofran prn   - monitor for GI function   - NPO with NGT to lws  - cont surgery recs  - will sign off, please call with questions

## 2018-12-28 LAB
BUN SERPL-MCNC: 8 MG/DL — SIGNIFICANT CHANGE UP (ref 7–23)
CALCIUM SERPL-MCNC: 8.4 MG/DL — SIGNIFICANT CHANGE UP (ref 8.4–10.5)
CHLORIDE SERPL-SCNC: 101 MMOL/L — SIGNIFICANT CHANGE UP (ref 98–107)
CO2 SERPL-SCNC: 30 MMOL/L — SIGNIFICANT CHANGE UP (ref 22–31)
CREAT SERPL-MCNC: 0.94 MG/DL — SIGNIFICANT CHANGE UP (ref 0.5–1.3)
GLUCOSE BLDC GLUCOMTR-MCNC: 118 MG/DL — HIGH (ref 70–99)
GLUCOSE BLDC GLUCOMTR-MCNC: 145 MG/DL — HIGH (ref 70–99)
GLUCOSE BLDC GLUCOMTR-MCNC: 151 MG/DL — HIGH (ref 70–99)
GLUCOSE BLDC GLUCOMTR-MCNC: 172 MG/DL — HIGH (ref 70–99)
GLUCOSE SERPL-MCNC: 153 MG/DL — HIGH (ref 70–99)
HCT VFR BLD CALC: 28.8 % — LOW (ref 34.5–45)
HGB BLD-MCNC: 8.6 G/DL — LOW (ref 11.5–15.5)
MAGNESIUM SERPL-MCNC: 1.8 MG/DL — SIGNIFICANT CHANGE UP (ref 1.6–2.6)
MCHC RBC-ENTMCNC: 29.9 % — LOW (ref 32–36)
MCHC RBC-ENTMCNC: 31 PG — SIGNIFICANT CHANGE UP (ref 27–34)
MCV RBC AUTO: 104 FL — HIGH (ref 80–100)
NRBC # FLD: 0 — SIGNIFICANT CHANGE UP
PHOSPHATE SERPL-MCNC: 2.5 MG/DL — SIGNIFICANT CHANGE UP (ref 2.5–4.5)
PLATELET # BLD AUTO: 414 K/UL — HIGH (ref 150–400)
PMV BLD: 9.4 FL — SIGNIFICANT CHANGE UP (ref 7–13)
POTASSIUM SERPL-MCNC: 4.6 MMOL/L — SIGNIFICANT CHANGE UP (ref 3.5–5.3)
POTASSIUM SERPL-SCNC: 4.6 MMOL/L — SIGNIFICANT CHANGE UP (ref 3.5–5.3)
RBC # BLD: 2.77 M/UL — LOW (ref 3.8–5.2)
RBC # FLD: 14.3 % — SIGNIFICANT CHANGE UP (ref 10.3–14.5)
SODIUM SERPL-SCNC: 141 MMOL/L — SIGNIFICANT CHANGE UP (ref 135–145)
WBC # BLD: 11.16 K/UL — HIGH (ref 3.8–10.5)
WBC # FLD AUTO: 11.16 K/UL — HIGH (ref 3.8–10.5)

## 2018-12-28 PROCEDURE — 99231 SBSQ HOSP IP/OBS SF/LOW 25: CPT | Mod: GC

## 2018-12-28 PROCEDURE — 99232 SBSQ HOSP IP/OBS MODERATE 35: CPT

## 2018-12-28 RX ORDER — FAMOTIDINE 10 MG/ML
20 INJECTION INTRAVENOUS DAILY
Qty: 0 | Refills: 0 | Status: DISCONTINUED | OUTPATIENT
Start: 2018-12-28 | End: 2018-12-30

## 2018-12-28 RX ORDER — ACETAMINOPHEN 500 MG
1000 TABLET ORAL ONCE
Qty: 0 | Refills: 0 | Status: COMPLETED | OUTPATIENT
Start: 2018-12-28 | End: 2018-12-28

## 2018-12-28 RX ORDER — INSULIN GLARGINE 100 [IU]/ML
10 INJECTION, SOLUTION SUBCUTANEOUS AT BEDTIME
Qty: 0 | Refills: 0 | Status: DISCONTINUED | OUTPATIENT
Start: 2018-12-28 | End: 2018-12-30

## 2018-12-28 RX ORDER — KETOROLAC TROMETHAMINE 30 MG/ML
30 SYRINGE (ML) INJECTION ONCE
Qty: 0 | Refills: 0 | Status: DISCONTINUED | OUTPATIENT
Start: 2018-12-28 | End: 2018-12-28

## 2018-12-28 RX ORDER — LORATADINE 10 MG/1
10 TABLET ORAL ONCE
Qty: 0 | Refills: 0 | Status: COMPLETED | OUTPATIENT
Start: 2018-12-28 | End: 2018-12-29

## 2018-12-28 RX ORDER — ACETAMINOPHEN 500 MG
1000 TABLET ORAL ONCE
Qty: 0 | Refills: 0 | Status: COMPLETED | OUTPATIENT
Start: 2018-12-29 | End: 2018-12-29

## 2018-12-28 RX ORDER — MAGNESIUM SULFATE 500 MG/ML
2 VIAL (ML) INJECTION ONCE
Qty: 0 | Refills: 0 | Status: COMPLETED | OUTPATIENT
Start: 2018-12-28 | End: 2018-12-28

## 2018-12-28 RX ADMIN — HYDROMORPHONE HYDROCHLORIDE 1 MILLIGRAM(S): 2 INJECTION INTRAMUSCULAR; INTRAVENOUS; SUBCUTANEOUS at 14:10

## 2018-12-28 RX ADMIN — HYDROMORPHONE HYDROCHLORIDE 1 MILLIGRAM(S): 2 INJECTION INTRAMUSCULAR; INTRAVENOUS; SUBCUTANEOUS at 09:46

## 2018-12-28 RX ADMIN — Medication 2: at 00:21

## 2018-12-28 RX ADMIN — Medication 10 MILLIGRAM(S): at 05:03

## 2018-12-28 RX ADMIN — HYDROMORPHONE HYDROCHLORIDE 1 MILLIGRAM(S): 2 INJECTION INTRAMUSCULAR; INTRAVENOUS; SUBCUTANEOUS at 18:13

## 2018-12-28 RX ADMIN — Medication 103 MICROGRAM(S): at 21:00

## 2018-12-28 RX ADMIN — Medication 400 MILLIGRAM(S): at 16:58

## 2018-12-28 RX ADMIN — Medication 5 MILLIGRAM(S): at 18:14

## 2018-12-28 RX ADMIN — HYDROMORPHONE HYDROCHLORIDE 1 MILLIGRAM(S): 2 INJECTION INTRAMUSCULAR; INTRAVENOUS; SUBCUTANEOUS at 10:05

## 2018-12-28 RX ADMIN — INSULIN GLARGINE 10 UNIT(S): 100 INJECTION, SOLUTION SUBCUTANEOUS at 22:16

## 2018-12-28 RX ADMIN — HYDROMORPHONE HYDROCHLORIDE 1 MILLIGRAM(S): 2 INJECTION INTRAMUSCULAR; INTRAVENOUS; SUBCUTANEOUS at 14:30

## 2018-12-28 RX ADMIN — Medication 1000 MILLIGRAM(S): at 11:40

## 2018-12-28 RX ADMIN — Medication 400 MILLIGRAM(S): at 23:22

## 2018-12-28 RX ADMIN — Medication 400 MILLIGRAM(S): at 11:10

## 2018-12-28 RX ADMIN — Medication 8 MILLIGRAM(S): at 05:03

## 2018-12-28 RX ADMIN — Medication 1000 MILLIGRAM(S): at 17:30

## 2018-12-28 RX ADMIN — Medication 10 MILLIGRAM(S): at 13:40

## 2018-12-28 RX ADMIN — HYDROMORPHONE HYDROCHLORIDE 1 MILLIGRAM(S): 2 INJECTION INTRAMUSCULAR; INTRAVENOUS; SUBCUTANEOUS at 01:55

## 2018-12-28 RX ADMIN — Medication 50 GRAM(S): at 10:59

## 2018-12-28 RX ADMIN — PANTOPRAZOLE SODIUM 40 MILLIGRAM(S): 20 TABLET, DELAYED RELEASE ORAL at 17:37

## 2018-12-28 RX ADMIN — Medication 30 MILLIGRAM(S): at 20:59

## 2018-12-28 RX ADMIN — CEFTRIAXONE 100 GRAM(S): 500 INJECTION, POWDER, FOR SOLUTION INTRAMUSCULAR; INTRAVENOUS at 16:59

## 2018-12-28 RX ADMIN — PANTOPRAZOLE SODIUM 40 MILLIGRAM(S): 20 TABLET, DELAYED RELEASE ORAL at 05:03

## 2018-12-28 RX ADMIN — BENZOCAINE AND MENTHOL 1 LOZENGE: 5; 1 LIQUID ORAL at 10:16

## 2018-12-28 RX ADMIN — BUDESONIDE AND FORMOTEROL FUMARATE DIHYDRATE 2 PUFF(S): 160; 4.5 AEROSOL RESPIRATORY (INHALATION) at 09:48

## 2018-12-28 RX ADMIN — HYDROMORPHONE HYDROCHLORIDE 1 MILLIGRAM(S): 2 INJECTION INTRAMUSCULAR; INTRAVENOUS; SUBCUTANEOUS at 01:26

## 2018-12-28 RX ADMIN — Medication 1000 MILLIGRAM(S): at 03:52

## 2018-12-28 RX ADMIN — HYDROMORPHONE HYDROCHLORIDE 1 MILLIGRAM(S): 2 INJECTION INTRAMUSCULAR; INTRAVENOUS; SUBCUTANEOUS at 18:40

## 2018-12-28 RX ADMIN — FAMOTIDINE 20 MILLIGRAM(S): 10 INJECTION INTRAVENOUS at 14:11

## 2018-12-28 RX ADMIN — Medication 10 MILLIGRAM(S): at 21:00

## 2018-12-28 RX ADMIN — HYDROMORPHONE HYDROCHLORIDE 1 MILLIGRAM(S): 2 INJECTION INTRAMUSCULAR; INTRAVENOUS; SUBCUTANEOUS at 22:16

## 2018-12-28 RX ADMIN — HYDROMORPHONE HYDROCHLORIDE 1 MILLIGRAM(S): 2 INJECTION INTRAMUSCULAR; INTRAVENOUS; SUBCUTANEOUS at 05:20

## 2018-12-28 RX ADMIN — BUDESONIDE AND FORMOTEROL FUMARATE DIHYDRATE 2 PUFF(S): 160; 4.5 AEROSOL RESPIRATORY (INHALATION) at 20:59

## 2018-12-28 RX ADMIN — Medication 2: at 06:53

## 2018-12-28 RX ADMIN — Medication 400 MILLIGRAM(S): at 03:38

## 2018-12-28 RX ADMIN — Medication 30 MILLIGRAM(S): at 21:30

## 2018-12-28 RX ADMIN — BENZOCAINE AND MENTHOL 1 LOZENGE: 5; 1 LIQUID ORAL at 16:14

## 2018-12-28 RX ADMIN — Medication 8 MILLIGRAM(S): at 17:39

## 2018-12-28 RX ADMIN — HYDROMORPHONE HYDROCHLORIDE 1 MILLIGRAM(S): 2 INJECTION INTRAMUSCULAR; INTRAVENOUS; SUBCUTANEOUS at 22:30

## 2018-12-28 RX ADMIN — TIOTROPIUM BROMIDE 1 CAPSULE(S): 18 CAPSULE ORAL; RESPIRATORY (INHALATION) at 20:59

## 2018-12-28 RX ADMIN — Medication 5 MILLIGRAM(S): at 12:29

## 2018-12-28 RX ADMIN — HYDROMORPHONE HYDROCHLORIDE 1 MILLIGRAM(S): 2 INJECTION INTRAMUSCULAR; INTRAVENOUS; SUBCUTANEOUS at 05:04

## 2018-12-28 RX ADMIN — Medication 1000 MILLIGRAM(S): at 23:45

## 2018-12-28 NOTE — PROGRESS NOTE ADULT - SUBJECTIVE AND OBJECTIVE BOX
S: Patient seen and examined.  No acute events overnight.  Pain controlled.  Reports having passed flatus.  Has yet to have a bowel movement.     O: Vital Signs  T(C): 37.1 (12-28 @ 09:50), Max: 37.7 (12-27 @ 17:38)  HR: 91 (12-28 @ 09:50) (76 - 91)  BP: 155/91 (12-28 @ 09:50) (107/69 - 155/91)  RR: 17 (12-28 @ 09:50) (16 - 18)  SpO2: 95% (12-28 @ 09:50) (95% - 100%)  12-27-18 @ 07:01  -  12-28-18 @ 07:00  --------------------------------------------------------  IN: 2500 mL / OUT: 1400 mL / NET: 1100 mL      Physical Exam:  Gen: Laying in bed, NAD  Resp: Unlabored breathing  Abd: soft, mild TTP, mildly distended ,incision with some serosanguinous drainage   Ext: WWP  Skin: No rashes                          8.6    11.16 )-----------( 414      ( 28 Dec 2018 06:50 )             28.8   12-28    141  |  101  |  8   ----------------------------<  153<H>  4.6   |  30  |  0.94    Ca    8.4      28 Dec 2018 06:50  Phos  2.5     12-28  Mg     1.8     12-28

## 2018-12-28 NOTE — PROGRESS NOTE ADULT - SUBJECTIVE AND OBJECTIVE BOX
Chief Complaint: DM2/chronic steroid, hypothyroidism    History: Patient remains NPO on D51/2NS@100 cc/hour.   She reports a headache at this time.  No hypoglycemia.    MEDICATIONS  (STANDING):  buDESOnide 160 MICROgram(s)/formoterol 4.5 MICROgram(s) Inhaler 2 Puff(s) Inhalation two times a day  cefTRIAXone   IVPB 1 Gram(s) IV Intermittent every 24 hours  clopidogrel Tablet 75 milliGRAM(s) Oral daily  dextrose 5% + sodium chloride 0.45% with potassium chloride 20 mEq/L 1000 milliLiter(s) (100 mL/Hr) IV Continuous <Continuous>  dextrose 50% Injectable 25 milliLiter(s) IV Push once  famotidine  IVPB 20 milliGRAM(s) IV Intermittent daily  fluticasone propionate 50 MICROgram(s)/spray Nasal Spray 1 Spray(s) Both Nostrils two times a day  heparin  Injectable 5000 Unit(s) SubCutaneous every 8 hours  influenza   Vaccine 0.5 milliLiter(s) IntraMuscular once  insulin glargine Injectable (LANTUS) 8 Unit(s) SubCutaneous at bedtime  insulin lispro (HumaLOG) corrective regimen sliding scale   SubCutaneous every 6 hours  levothyroxine Injectable 103 MICROGram(s) IV Push at bedtime  methylPREDNISolone sodium succinate Injectable 8 milliGRAM(s) IV Push every 12 hours  metoclopramide Injectable 10 milliGRAM(s) IV Push every 8 hours  metoprolol tartrate Injectable 5 milliGRAM(s) IV Push every 6 hours  pantoprazole  Injectable 40 milliGRAM(s) IV Push two times a day  tiotropium 18 MICROgram(s) Capsule 1 Capsule(s) Inhalation daily    MEDICATIONS  (PRN):  ALBUTerol    90 MICROgram(s) HFA Inhaler 1 Puff(s) Inhalation every 4 hours PRN Shortness of Breath and/or Wheezing  benzocaine 15 mG/menthol 3.6 mG Lozenge 1 Lozenge Oral every 4 hours PRN Sore Throat  glucagon  Injectable 1 milliGRAM(s) IntraMuscular once PRN Glucose LESS THAN 70 milligrams/deciliter  HYDROmorphone  Injectable 0.5 milliGRAM(s) IV Push every 3 hours PRN Moderate Pain (4 - 6)  HYDROmorphone  Injectable 1 milliGRAM(s) IV Push every 4 hours PRN Severe Pain (7 - 10)  sodium chloride 0.65% Nasal 1 Spray(s) Both Nostrils every 6 hours PRN Nasal Congestion  tetracaine/benzocaine/butamben Spray 1 Spray(s) Topical every 8 hours PRN NGT      Allergies    No Known Allergies    Intolerances      Review of Systems:    ALL OTHER SYSTEMS REVIEWED AND NEGATIVE      PHYSICAL EXAM:  VITALS: T(C): 37.1 (12-28-18 @ 09:50)  T(F): 98.7 (12-28-18 @ 09:50), Max: 99.8 (12-27-18 @ 17:38)  HR: 83 (12-28-18 @ 12:27) (76 - 91)  BP: 127/75 (12-28-18 @ 12:27) (107/69 - 155/91)  RR:  (16 - 18)  SpO2:  (95% - 100%)  Wt(kg): --  GENERAL: NAD, well-groomed, well-developed  EYES: No proptosis, no lid lag, anicteric  HEENT:  Atraumatic, Normocephalic, NGT in place  NEURO: extraocular movements intact, no tremor  PSYCH: Alert and oriented x 3, normal affect, normal mood      CAPILLARY BLOOD GLUCOSE      POCT Blood Glucose.: 145 mg/dL (28 Dec 2018 12:03)  POCT Blood Glucose.: 151 mg/dL (28 Dec 2018 06:48)  POCT Blood Glucose.: 157 mg/dL (27 Dec 2018 23:44)  POCT Blood Glucose.: 139 mg/dL (27 Dec 2018 21:38)  POCT Blood Glucose.: 177 mg/dL (27 Dec 2018 17:51)      12-28    141  |  101  |  8   ----------------------------<  153<H>  4.6   |  30  |  0.94    EGFR if : 76  EGFR if non : 66    Ca    8.4      12-28  Mg     1.8     12-28  Phos  2.5     12-28            Thyroid Function Tests:  12-27 @ 07:15 TSH -- FreeT4 1.18 T3 -- Anti TPO -- Anti Thyroglobulin Ab -- TSI --  12-20 @ 06:45 TSH 40.15 FreeT4 0.98 T3 -- Anti TPO -- Anti Thyroglobulin Ab -- TSI --      Hemoglobin A1C, Whole Blood: 7.1 % <H> [4.0 - 5.6] (12-17-18 @ 03:50)  Hemoglobin A1C, Whole Blood: 7.3 % <H> [4.0 - 5.6] (12-16-18 @ 04:17)

## 2018-12-28 NOTE — PROGRESS NOTE ADULT - PROBLEM SELECTOR PLAN 1
Continued on NPO with IV fluids with dextros.  - Increase Lantus to 10 units at bedtime  -Continue moderate correction scale q6h    - d/c likely on prandin, sitagliptin and likely lower dose tresiba pending FSG trends  -Follow up with Dr. Saul as outpatient.

## 2018-12-28 NOTE — PROGRESS NOTE ADULT - ASSESSMENT
63F w/ multiple respiratory comorbidities p/w SBO s/p ex-lap with findings of healthy bowel.    - PPI BID   - Clamp NGT today and monitor n/v  - Continue to monitor respiratory status in the setting of holding lasix  - Appreciate pulmonology recs  - DVT ppx  - encourage OOB   - monitor UOP

## 2018-12-28 NOTE — PROGRESS NOTE ADULT - SUBJECTIVE AND OBJECTIVE BOX
CHIEF COMPLAINT:    Interval Events:    REVIEW OF SYSTEMS:  Constitutional: [ ] negative [ ] fevers [ ] chills [ ] weight loss [ ] weight gain  HEENT: [ ] negative [ ] dry eyes [ ] eye irritation [ ] postnasal drip [ ] nasal congestion  CV: [ ] negative  [ ] chest pain [ ] orthopnea [ ] palpitations [ ] murmur  Resp: [ ] negative [ ] cough [ ] shortness of breath [ ] dyspnea [ ] wheezing [ ] sputum [ ] hemoptysis  GI: [ ] negative [ ] nausea [ ] vomiting [ ] diarrhea [ ] constipation [ ] abd pain [ ] dysphagia   : [ ] negative [ ] dysuria [ ] nocturia [ ] hematuria [ ] increased urinary frequency  Musculoskeletal: [ ] negative [ ] back pain [ ] myalgias [ ] arthralgias [ ] fracture  Skin: [ ] negative [ ] rash [ ] itch  Neurological: [ ] negative [ ] headache [ ] dizziness [ ] syncope [ ] weakness [ ] numbness  Psychiatric: [ ] negative [ ] anxiety [ ] depression  Endocrine: [ ] negative [ ] diabetes [ ] thyroid problem  Hematologic/Lymphatic: [ ] negative [ ] anemia [ ] bleeding problem  Allergic/Immunologic: [ ] negative [ ] itchy eyes [ ] nasal discharge [ ] hives [ ] angioedema  [ ] All other systems negative  [ ] Unable to assess ROS because ________    OBJECTIVE:  ICU Vital Signs Last 24 Hrs  T(C): 37.1 (28 Dec 2018 05:00), Max: 37.7 (27 Dec 2018 17:38)  T(F): 98.8 (28 Dec 2018 05:00), Max: 99.8 (27 Dec 2018 17:38)  HR: 86 (28 Dec 2018 05:00) (76 - 89)  BP: 121/66 (28 Dec 2018 05:00) (105/68 - 146/83)  BP(mean): --  ABP: --  ABP(mean): --  RR: 16 (28 Dec 2018 05:00) (16 - 18)  SpO2: 96% (28 Dec 2018 05:00) (96% - 100%)        12-27 @ 07:01  -  12-28 @ 07:00  --------------------------------------------------------  IN: 2500 mL / OUT: 1400 mL / NET: 1100 mL      CAPILLARY BLOOD GLUCOSE      POCT Blood Glucose.: 151 mg/dL (28 Dec 2018 06:48)      PHYSICAL EXAM:  General:   HEENT:   Lymph Nodes:  Neck:   Respiratory:   Cardiovascular:   Abdomen:   Extremities:   Skin:   Neurological:  Psychiatry:    HOSPITAL MEDICATIONS:  MEDICATIONS  (STANDING):  buDESOnide 160 MICROgram(s)/formoterol 4.5 MICROgram(s) Inhaler 2 Puff(s) Inhalation two times a day  cefTRIAXone   IVPB 1 Gram(s) IV Intermittent every 24 hours  clopidogrel Tablet 75 milliGRAM(s) Oral daily  dextrose 5% + sodium chloride 0.45% with potassium chloride 20 mEq/L 1000 milliLiter(s) (100 mL/Hr) IV Continuous <Continuous>  dextrose 50% Injectable 25 milliLiter(s) IV Push once  famotidine  IVPB 20 milliGRAM(s) IV Intermittent daily  fluticasone propionate 50 MICROgram(s)/spray Nasal Spray 1 Spray(s) Both Nostrils two times a day  heparin  Injectable 5000 Unit(s) SubCutaneous every 8 hours  influenza   Vaccine 0.5 milliLiter(s) IntraMuscular once  insulin glargine Injectable (LANTUS) 8 Unit(s) SubCutaneous at bedtime  insulin lispro (HumaLOG) corrective regimen sliding scale   SubCutaneous every 6 hours  levothyroxine Injectable 103 MICROGram(s) IV Push at bedtime  methylPREDNISolone sodium succinate Injectable 8 milliGRAM(s) IV Push every 12 hours  metoclopramide Injectable 10 milliGRAM(s) IV Push every 8 hours  metoprolol tartrate Injectable 5 milliGRAM(s) IV Push every 6 hours  pantoprazole  Injectable 40 milliGRAM(s) IV Push two times a day  tiotropium 18 MICROgram(s) Capsule 1 Capsule(s) Inhalation daily    MEDICATIONS  (PRN):  ALBUTerol    90 MICROgram(s) HFA Inhaler 1 Puff(s) Inhalation every 4 hours PRN Shortness of Breath and/or Wheezing  benzocaine 15 mG/menthol 3.6 mG Lozenge 1 Lozenge Oral every 4 hours PRN Sore Throat  glucagon  Injectable 1 milliGRAM(s) IntraMuscular once PRN Glucose LESS THAN 70 milligrams/deciliter  HYDROmorphone  Injectable 0.5 milliGRAM(s) IV Push every 3 hours PRN Moderate Pain (4 - 6)  HYDROmorphone  Injectable 1 milliGRAM(s) IV Push every 4 hours PRN Severe Pain (7 - 10)  sodium chloride 0.65% Nasal 1 Spray(s) Both Nostrils every 6 hours PRN Nasal Congestion  tetracaine/benzocaine/butamben Spray 1 Spray(s) Topical every 8 hours PRN NGT      LABS:                        8.6    11.16 )-----------( 414      ( 28 Dec 2018 06:50 )             28.8     Hgb Trend: 8.6<--, 9.6<--, 10.9<--, 10.2<--, 10.2<--  12-28    141  |  101  |  8   ----------------------------<  153<H>  4.6   |  30  |  0.94    Ca    8.4      28 Dec 2018 06:50  Phos  2.5     12-28  Mg     1.8     12-28      Creatinine Trend: 0.94<--, 1.05<--, 0.99<--, 0.95<--, 1.04<--, 0.87<--            MICROBIOLOGY:       RADIOLOGY:  [ ] Reviewed and interpreted by me    PULMONARY FUNCTION TESTS:    EKG: CHIEF COMPLAINT: SBO    Interval Events: Pt seen and examined at bedside. No acute events overnight. Pt more comfortable on current pain regimen. Breathing unremarkable. No new symptoms or concerns.     REVIEW OF SYSTEMS:  Constitutional: [x ] negative [ ] fevers [ ] chills [ ] weight loss [ ] weight gain  HEENT: [x ] negative [ ] dry eyes [ ] eye irritation [ ] postnasal drip [ ] nasal congestion  CV: [ x] negative  [ ] chest pain [ ] orthopnea [ ] palpitations [ ] murmur  Resp: [x ] negative [ ] cough [ ] shortness of breath [ ] dyspnea [ ] wheezing [ ] sputum [ ] hemoptysis  GI: [ ] negative [x ] nausea [x ] vomiting [ ] diarrhea [ ] constipation [ ] abd pain [ ] dysphagia   : [ ] negative [ ] dysuria [ ] nocturia [ ] hematuria [ ] increased urinary frequency  Musculoskeletal: [x ] negative [ ] back pain [ ] myalgias [ ] arthralgias [ ] fracture  Skin: [x ] negative [ ] rash [ ] itch  Neurological: [x ] negative [ ] headache [ ] dizziness [ ] syncope [ ] weakness [ ] numbness  Psychiatric: [ ] negative [ ] anxiety [ ] depression  Endocrine: [ ] negative [ ] diabetes [ ] thyroid problem  Hematologic/Lymphatic: [ ] negative [ ] anemia [ ] bleeding problem  Allergic/Immunologic: [ ] negative [ ] itchy eyes [ ] nasal discharge [ ] hives [ ] angioedema  [x ] All other systems negative  [ ] Unable to assess ROS because ________    OBJECTIVE:  ICU Vital Signs Last 24 Hrs  T(C): 37.1 (28 Dec 2018 05:00), Max: 37.7 (27 Dec 2018 17:38)  T(F): 98.8 (28 Dec 2018 05:00), Max: 99.8 (27 Dec 2018 17:38)  HR: 86 (28 Dec 2018 05:00) (76 - 89)  BP: 121/66 (28 Dec 2018 05:00) (105/68 - 146/83)  BP(mean): --  ABP: --  ABP(mean): --  RR: 16 (28 Dec 2018 05:00) (16 - 18)  SpO2: 96% (28 Dec 2018 05:00) (96% - 100%)        12-27 @ 07:01  -  12-28 @ 07:00  --------------------------------------------------------  IN: 2500 mL / OUT: 1400 mL / NET: 1100 mL      CAPILLARY BLOOD GLUCOSE      POCT Blood Glucose.: 151 mg/dL (28 Dec 2018 06:48)      PHYSICAL EXAM:  General: NAD, appears comfortably on NC  HEENT: NCAT, MOM, KWAME/EOMI  Neck: Supple  Respiratory: CTA b/l, (-) rhonchi/wheezing/rales  Cardiovascular: RRR, S1/S2, (-) m/g/r, (-) pitting edema  Extremities: (-) clubbing/cyanosis/edema  Skin: c/d/i. NGT in place   Neurological: No new focal findings  Psychiatry: Appropriate    HOSPITAL MEDICATIONS:  MEDICATIONS  (STANDING):  buDESOnide 160 MICROgram(s)/formoterol 4.5 MICROgram(s) Inhaler 2 Puff(s) Inhalation two times a day  cefTRIAXone   IVPB 1 Gram(s) IV Intermittent every 24 hours  clopidogrel Tablet 75 milliGRAM(s) Oral daily  dextrose 5% + sodium chloride 0.45% with potassium chloride 20 mEq/L 1000 milliLiter(s) (100 mL/Hr) IV Continuous <Continuous>  dextrose 50% Injectable 25 milliLiter(s) IV Push once  famotidine  IVPB 20 milliGRAM(s) IV Intermittent daily  fluticasone propionate 50 MICROgram(s)/spray Nasal Spray 1 Spray(s) Both Nostrils two times a day  heparin  Injectable 5000 Unit(s) SubCutaneous every 8 hours  influenza   Vaccine 0.5 milliLiter(s) IntraMuscular once  insulin glargine Injectable (LANTUS) 8 Unit(s) SubCutaneous at bedtime  insulin lispro (HumaLOG) corrective regimen sliding scale   SubCutaneous every 6 hours  levothyroxine Injectable 103 MICROGram(s) IV Push at bedtime  methylPREDNISolone sodium succinate Injectable 8 milliGRAM(s) IV Push every 12 hours  metoclopramide Injectable 10 milliGRAM(s) IV Push every 8 hours  metoprolol tartrate Injectable 5 milliGRAM(s) IV Push every 6 hours  pantoprazole  Injectable 40 milliGRAM(s) IV Push two times a day  tiotropium 18 MICROgram(s) Capsule 1 Capsule(s) Inhalation daily    MEDICATIONS  (PRN):  ALBUTerol    90 MICROgram(s) HFA Inhaler 1 Puff(s) Inhalation every 4 hours PRN Shortness of Breath and/or Wheezing  benzocaine 15 mG/menthol 3.6 mG Lozenge 1 Lozenge Oral every 4 hours PRN Sore Throat  glucagon  Injectable 1 milliGRAM(s) IntraMuscular once PRN Glucose LESS THAN 70 milligrams/deciliter  HYDROmorphone  Injectable 0.5 milliGRAM(s) IV Push every 3 hours PRN Moderate Pain (4 - 6)  HYDROmorphone  Injectable 1 milliGRAM(s) IV Push every 4 hours PRN Severe Pain (7 - 10)  sodium chloride 0.65% Nasal 1 Spray(s) Both Nostrils every 6 hours PRN Nasal Congestion  tetracaine/benzocaine/butamben Spray 1 Spray(s) Topical every 8 hours PRN NGT      LABS:                        8.6    11.16 )-----------( 414      ( 28 Dec 2018 06:50 )             28.8     Hgb Trend: 8.6<--, 9.6<--, 10.9<--, 10.2<--, 10.2<--  12-28    141  |  101  |  8   ----------------------------<  153<H>  4.6   |  30  |  0.94    Ca    8.4      28 Dec 2018 06:50  Phos  2.5     12-28  Mg     1.8     12-28      Creatinine Trend: 0.94<--, 1.05<--, 0.99<--, 0.95<--, 1.04<--, 0.87<--            MICROBIOLOGY: Reviewed       RADIOLOGY:  [x ] Reviewed and interpreted by me    PULMONARY FUNCTION TESTS: None    EKG:

## 2018-12-28 NOTE — PROGRESS NOTE ADULT - PROBLEM SELECTOR PLAN 2
Currently on IV Levothyroxine 103 mcg daily.  Free T4 shows improvement on current regimen.  When tolerating po can change to levothyroxine 137 mcg po daily.

## 2018-12-28 NOTE — PROGRESS NOTE ADULT - ASSESSMENT
Pt is a 63F with PMHx rheumatoid arthritis (on chronic Prednisone 10mg QD x30 years), morbid obesity, CHRIS/OHS on qhs NIV bilevel, former smoker with COPD and pulmHTN (WHO I/III) who presents to [facility] 12/15 with acute onset abdominal pain and NBNB vomiting 2/2 SBO s/p emergent exploratory laparotomy with lysis of adhesions (12/16) c/b persistent episodes of nausea/vomiting. Pulmonary consulted for management of pulmonary co-morbidities while in hospital. Pt currently comfortable and in no respiratory distress.   -Agree with current inhaler therapy: Symbicort 160-4.5 mcg 2 puffs BID and Spiriva 18mcg qhs. Can c/w Albuterol inhaler prn. No indication for escalation of therapy.  -c/w Prednisone (or IV equivalent) at pt's home dose 10mg QD or as per primary surgical team. No need to increase steroids for management of COPD  -Will hold home Bilevel NIV qhs for now while NGT is in place. Will need to restart after NGT removed.   -Okay to hold Lasix as per primary team as pt does not currently show evidence of pulmonary edema. Will continue to monitor clinically  -Pulmonary will continue to follow

## 2018-12-29 ENCOUNTER — TRANSCRIPTION ENCOUNTER (OUTPATIENT)
Age: 63
End: 2018-12-29

## 2018-12-29 LAB
BUN SERPL-MCNC: 8 MG/DL — SIGNIFICANT CHANGE UP (ref 7–23)
CALCIUM SERPL-MCNC: 8.5 MG/DL — SIGNIFICANT CHANGE UP (ref 8.4–10.5)
CHLORIDE SERPL-SCNC: 102 MMOL/L — SIGNIFICANT CHANGE UP (ref 98–107)
CO2 SERPL-SCNC: 28 MMOL/L — SIGNIFICANT CHANGE UP (ref 22–31)
CREAT SERPL-MCNC: 0.97 MG/DL — SIGNIFICANT CHANGE UP (ref 0.5–1.3)
GLUCOSE BLDC GLUCOMTR-MCNC: 114 MG/DL — HIGH (ref 70–99)
GLUCOSE BLDC GLUCOMTR-MCNC: 133 MG/DL — HIGH (ref 70–99)
GLUCOSE BLDC GLUCOMTR-MCNC: 144 MG/DL — HIGH (ref 70–99)
GLUCOSE BLDC GLUCOMTR-MCNC: 168 MG/DL — HIGH (ref 70–99)
GLUCOSE BLDC GLUCOMTR-MCNC: 179 MG/DL — HIGH (ref 70–99)
GLUCOSE SERPL-MCNC: 115 MG/DL — HIGH (ref 70–99)
HCT VFR BLD CALC: 28.5 % — LOW (ref 34.5–45)
HGB BLD-MCNC: 8.7 G/DL — LOW (ref 11.5–15.5)
MAGNESIUM SERPL-MCNC: 2.2 MG/DL — SIGNIFICANT CHANGE UP (ref 1.6–2.6)
MCHC RBC-ENTMCNC: 30.5 % — LOW (ref 32–36)
MCHC RBC-ENTMCNC: 31.3 PG — SIGNIFICANT CHANGE UP (ref 27–34)
MCV RBC AUTO: 102.5 FL — HIGH (ref 80–100)
NRBC # FLD: 0 — SIGNIFICANT CHANGE UP
PHOSPHATE SERPL-MCNC: 2.4 MG/DL — LOW (ref 2.5–4.5)
PLATELET # BLD AUTO: 389 K/UL — SIGNIFICANT CHANGE UP (ref 150–400)
PMV BLD: 8.8 FL — SIGNIFICANT CHANGE UP (ref 7–13)
POTASSIUM SERPL-MCNC: 4.7 MMOL/L — SIGNIFICANT CHANGE UP (ref 3.5–5.3)
POTASSIUM SERPL-SCNC: 4.7 MMOL/L — SIGNIFICANT CHANGE UP (ref 3.5–5.3)
RBC # BLD: 2.78 M/UL — LOW (ref 3.8–5.2)
RBC # FLD: 14.5 % — SIGNIFICANT CHANGE UP (ref 10.3–14.5)
SODIUM SERPL-SCNC: 140 MMOL/L — SIGNIFICANT CHANGE UP (ref 135–145)
WBC # BLD: 9.18 K/UL — SIGNIFICANT CHANGE UP (ref 3.8–10.5)
WBC # FLD AUTO: 9.18 K/UL — SIGNIFICANT CHANGE UP (ref 3.8–10.5)

## 2018-12-29 RX ORDER — LORATADINE 10 MG/1
10 TABLET ORAL DAILY
Qty: 0 | Refills: 0 | Status: DISCONTINUED | OUTPATIENT
Start: 2018-12-29 | End: 2018-12-30

## 2018-12-29 RX ORDER — HYDROMORPHONE HYDROCHLORIDE 2 MG/ML
0.5 INJECTION INTRAMUSCULAR; INTRAVENOUS; SUBCUTANEOUS
Qty: 0 | Refills: 0 | Status: DISCONTINUED | OUTPATIENT
Start: 2018-12-29 | End: 2018-12-30

## 2018-12-29 RX ORDER — POTASSIUM PHOSPHATE, MONOBASIC POTASSIUM PHOSPHATE, DIBASIC 236; 224 MG/ML; MG/ML
15 INJECTION, SOLUTION INTRAVENOUS ONCE
Qty: 0 | Refills: 0 | Status: COMPLETED | OUTPATIENT
Start: 2018-12-29 | End: 2018-12-29

## 2018-12-29 RX ORDER — DEXTROSE 50 % IN WATER 50 %
12.5 SYRINGE (ML) INTRAVENOUS ONCE
Qty: 0 | Refills: 0 | Status: DISCONTINUED | OUTPATIENT
Start: 2018-12-29 | End: 2019-01-03

## 2018-12-29 RX ORDER — INSULIN LISPRO 100/ML
VIAL (ML) SUBCUTANEOUS
Qty: 0 | Refills: 0 | Status: DISCONTINUED | OUTPATIENT
Start: 2018-12-29 | End: 2019-01-02

## 2018-12-29 RX ORDER — INSULIN LISPRO 100/ML
VIAL (ML) SUBCUTANEOUS AT BEDTIME
Qty: 0 | Refills: 0 | Status: DISCONTINUED | OUTPATIENT
Start: 2018-12-29 | End: 2019-01-02

## 2018-12-29 RX ORDER — GABAPENTIN 400 MG/1
400 CAPSULE ORAL THREE TIMES A DAY
Qty: 0 | Refills: 0 | Status: DISCONTINUED | OUTPATIENT
Start: 2018-12-29 | End: 2018-12-30

## 2018-12-29 RX ADMIN — Medication 8 MILLIGRAM(S): at 05:09

## 2018-12-29 RX ADMIN — HEPARIN SODIUM 5000 UNIT(S): 5000 INJECTION INTRAVENOUS; SUBCUTANEOUS at 21:43

## 2018-12-29 RX ADMIN — ALBUTEROL 1 PUFF(S): 90 AEROSOL, METERED ORAL at 14:29

## 2018-12-29 RX ADMIN — HYDROMORPHONE HYDROCHLORIDE 0.5 MILLIGRAM(S): 2 INJECTION INTRAMUSCULAR; INTRAVENOUS; SUBCUTANEOUS at 00:45

## 2018-12-29 RX ADMIN — Medication 200 MILLIGRAM(S): at 00:43

## 2018-12-29 RX ADMIN — HYDROMORPHONE HYDROCHLORIDE 1 MILLIGRAM(S): 2 INJECTION INTRAMUSCULAR; INTRAVENOUS; SUBCUTANEOUS at 06:28

## 2018-12-29 RX ADMIN — CEFTRIAXONE 100 GRAM(S): 500 INJECTION, POWDER, FOR SOLUTION INTRAMUSCULAR; INTRAVENOUS at 18:50

## 2018-12-29 RX ADMIN — Medication 2: at 00:26

## 2018-12-29 RX ADMIN — HYDROMORPHONE HYDROCHLORIDE 0.5 MILLIGRAM(S): 2 INJECTION INTRAMUSCULAR; INTRAVENOUS; SUBCUTANEOUS at 08:38

## 2018-12-29 RX ADMIN — Medication 200 MILLIGRAM(S): at 06:28

## 2018-12-29 RX ADMIN — HYDROMORPHONE HYDROCHLORIDE 1 MILLIGRAM(S): 2 INJECTION INTRAMUSCULAR; INTRAVENOUS; SUBCUTANEOUS at 11:13

## 2018-12-29 RX ADMIN — Medication 5 MILLIGRAM(S): at 13:14

## 2018-12-29 RX ADMIN — Medication 1000 MILLIGRAM(S): at 13:51

## 2018-12-29 RX ADMIN — Medication 600 MILLIGRAM(S): at 16:23

## 2018-12-29 RX ADMIN — FAMOTIDINE 20 MILLIGRAM(S): 10 INJECTION INTRAVENOUS at 13:14

## 2018-12-29 RX ADMIN — HYDROMORPHONE HYDROCHLORIDE 0.5 MILLIGRAM(S): 2 INJECTION INTRAMUSCULAR; INTRAVENOUS; SUBCUTANEOUS at 05:10

## 2018-12-29 RX ADMIN — HYDROMORPHONE HYDROCHLORIDE 1 MILLIGRAM(S): 2 INJECTION INTRAMUSCULAR; INTRAVENOUS; SUBCUTANEOUS at 10:43

## 2018-12-29 RX ADMIN — HYDROMORPHONE HYDROCHLORIDE 0.5 MILLIGRAM(S): 2 INJECTION INTRAMUSCULAR; INTRAVENOUS; SUBCUTANEOUS at 09:08

## 2018-12-29 RX ADMIN — TIOTROPIUM BROMIDE 1 CAPSULE(S): 18 CAPSULE ORAL; RESPIRATORY (INHALATION) at 21:46

## 2018-12-29 RX ADMIN — BENZOCAINE AND MENTHOL 1 LOZENGE: 5; 1 LIQUID ORAL at 05:10

## 2018-12-29 RX ADMIN — HYDROMORPHONE HYDROCHLORIDE 1 MILLIGRAM(S): 2 INJECTION INTRAMUSCULAR; INTRAVENOUS; SUBCUTANEOUS at 06:44

## 2018-12-29 RX ADMIN — PANTOPRAZOLE SODIUM 40 MILLIGRAM(S): 20 TABLET, DELAYED RELEASE ORAL at 05:09

## 2018-12-29 RX ADMIN — HYDROMORPHONE HYDROCHLORIDE 0.5 MILLIGRAM(S): 2 INJECTION INTRAMUSCULAR; INTRAVENOUS; SUBCUTANEOUS at 13:14

## 2018-12-29 RX ADMIN — Medication 5 MILLIGRAM(S): at 05:09

## 2018-12-29 RX ADMIN — HYDROMORPHONE HYDROCHLORIDE 1 MILLIGRAM(S): 2 INJECTION INTRAMUSCULAR; INTRAVENOUS; SUBCUTANEOUS at 19:35

## 2018-12-29 RX ADMIN — Medication 5 MILLIGRAM(S): at 00:27

## 2018-12-29 RX ADMIN — Medication 103 MICROGRAM(S): at 21:44

## 2018-12-29 RX ADMIN — INSULIN GLARGINE 10 UNIT(S): 100 INJECTION, SOLUTION SUBCUTANEOUS at 21:43

## 2018-12-29 RX ADMIN — HYDROMORPHONE HYDROCHLORIDE 0.5 MILLIGRAM(S): 2 INJECTION INTRAMUSCULAR; INTRAVENOUS; SUBCUTANEOUS at 16:53

## 2018-12-29 RX ADMIN — Medication 10 MILLIGRAM(S): at 05:09

## 2018-12-29 RX ADMIN — Medication 5 MILLIGRAM(S): at 23:30

## 2018-12-29 RX ADMIN — Medication 10 MILLIGRAM(S): at 21:43

## 2018-12-29 RX ADMIN — HYDROMORPHONE HYDROCHLORIDE 0.5 MILLIGRAM(S): 2 INJECTION INTRAMUSCULAR; INTRAVENOUS; SUBCUTANEOUS at 13:51

## 2018-12-29 RX ADMIN — BUDESONIDE AND FORMOTEROL FUMARATE DIHYDRATE 2 PUFF(S): 160; 4.5 AEROSOL RESPIRATORY (INHALATION) at 08:38

## 2018-12-29 RX ADMIN — BENZOCAINE AND MENTHOL 1 LOZENGE: 5; 1 LIQUID ORAL at 10:43

## 2018-12-29 RX ADMIN — Medication 1 SPRAY(S): at 18:31

## 2018-12-29 RX ADMIN — HYDROMORPHONE HYDROCHLORIDE 0.5 MILLIGRAM(S): 2 INJECTION INTRAMUSCULAR; INTRAVENOUS; SUBCUTANEOUS at 16:23

## 2018-12-29 RX ADMIN — HYDROMORPHONE HYDROCHLORIDE 0.5 MILLIGRAM(S): 2 INJECTION INTRAMUSCULAR; INTRAVENOUS; SUBCUTANEOUS at 22:43

## 2018-12-29 RX ADMIN — Medication 8 MILLIGRAM(S): at 18:33

## 2018-12-29 RX ADMIN — LORATADINE 10 MILLIGRAM(S): 10 TABLET ORAL at 00:42

## 2018-12-29 RX ADMIN — Medication 200 MILLIGRAM(S): at 13:14

## 2018-12-29 RX ADMIN — Medication 400 MILLIGRAM(S): at 05:10

## 2018-12-29 RX ADMIN — HYDROMORPHONE HYDROCHLORIDE 0.5 MILLIGRAM(S): 2 INJECTION INTRAMUSCULAR; INTRAVENOUS; SUBCUTANEOUS at 22:13

## 2018-12-29 RX ADMIN — HYDROMORPHONE HYDROCHLORIDE 1 MILLIGRAM(S): 2 INJECTION INTRAMUSCULAR; INTRAVENOUS; SUBCUTANEOUS at 19:05

## 2018-12-29 RX ADMIN — Medication 5 MILLIGRAM(S): at 18:32

## 2018-12-29 RX ADMIN — Medication 2: at 18:32

## 2018-12-29 RX ADMIN — HYDROMORPHONE HYDROCHLORIDE 1 MILLIGRAM(S): 2 INJECTION INTRAMUSCULAR; INTRAVENOUS; SUBCUTANEOUS at 23:30

## 2018-12-29 RX ADMIN — CLOPIDOGREL BISULFATE 75 MILLIGRAM(S): 75 TABLET, FILM COATED ORAL at 13:23

## 2018-12-29 RX ADMIN — HEPARIN SODIUM 5000 UNIT(S): 5000 INJECTION INTRAVENOUS; SUBCUTANEOUS at 13:20

## 2018-12-29 RX ADMIN — HYDROMORPHONE HYDROCHLORIDE 1 MILLIGRAM(S): 2 INJECTION INTRAMUSCULAR; INTRAVENOUS; SUBCUTANEOUS at 14:59

## 2018-12-29 RX ADMIN — PANTOPRAZOLE SODIUM 40 MILLIGRAM(S): 20 TABLET, DELAYED RELEASE ORAL at 18:32

## 2018-12-29 RX ADMIN — GABAPENTIN 400 MILLIGRAM(S): 400 CAPSULE ORAL at 15:00

## 2018-12-29 RX ADMIN — HYDROMORPHONE HYDROCHLORIDE 0.5 MILLIGRAM(S): 2 INJECTION INTRAMUSCULAR; INTRAVENOUS; SUBCUTANEOUS at 05:30

## 2018-12-29 RX ADMIN — Medication 10 MILLIGRAM(S): at 13:19

## 2018-12-29 RX ADMIN — HYDROMORPHONE HYDROCHLORIDE 0.5 MILLIGRAM(S): 2 INJECTION INTRAMUSCULAR; INTRAVENOUS; SUBCUTANEOUS at 00:26

## 2018-12-29 RX ADMIN — BENZOCAINE AND MENTHOL 1 LOZENGE: 5; 1 LIQUID ORAL at 15:02

## 2018-12-29 RX ADMIN — Medication 1000 MILLIGRAM(S): at 05:30

## 2018-12-29 RX ADMIN — POTASSIUM PHOSPHATE, MONOBASIC POTASSIUM PHOSPHATE, DIBASIC 62.5 MILLIMOLE(S): 236; 224 INJECTION, SOLUTION INTRAVENOUS at 14:59

## 2018-12-29 RX ADMIN — Medication 400 MILLIGRAM(S): at 13:12

## 2018-12-29 RX ADMIN — HYDROMORPHONE HYDROCHLORIDE 1 MILLIGRAM(S): 2 INJECTION INTRAMUSCULAR; INTRAVENOUS; SUBCUTANEOUS at 02:40

## 2018-12-29 RX ADMIN — HYDROMORPHONE HYDROCHLORIDE 1 MILLIGRAM(S): 2 INJECTION INTRAMUSCULAR; INTRAVENOUS; SUBCUTANEOUS at 15:02

## 2018-12-29 RX ADMIN — HYDROMORPHONE HYDROCHLORIDE 1 MILLIGRAM(S): 2 INJECTION INTRAMUSCULAR; INTRAVENOUS; SUBCUTANEOUS at 02:21

## 2018-12-29 RX ADMIN — BUDESONIDE AND FORMOTEROL FUMARATE DIHYDRATE 2 PUFF(S): 160; 4.5 AEROSOL RESPIRATORY (INHALATION) at 21:44

## 2018-12-29 NOTE — PROGRESS NOTE ADULT - ASSESSMENT
63F w/ multiple respiratory comorbidities p/w SBO s/p ex-lap with findings of healthy bowel.    - PPI BID   - DC NGT and CLD  - Continue to monitor respiratory status in the setting of holding lasix  - Appreciate pulmonology recs  - DVT ppx  - encourage OOB   - Decrease IVF rate

## 2018-12-29 NOTE — PROGRESS NOTE ADULT - SUBJECTIVE AND OBJECTIVE BOX
S: Patient seen and examined.  No acute events overnight.  Pain controlled.  Reports having passed flatus.  Has yet to have a bowel movement.  Denies fever, chills, nausea, vomiting.      Vital Signs Last 24 Hrs  T(C): 36.9 (29 Dec 2018 17:50), Max: 37.2 (28 Dec 2018 22:14)  T(F): 98.4 (29 Dec 2018 17:50), Max: 98.9 (28 Dec 2018 22:14)  HR: 89 (29 Dec 2018 17:50) (79 - 90)  BP: 142/80 (29 Dec 2018 17:50) (127/80 - 159/90)  BP(mean): --  RR: 17 (29 Dec 2018 17:50) (17 - 18)  SpO2: 98% (29 Dec 2018 17:50) (96% - 99%)    I&O's Detail    28 Dec 2018 07:01  -  29 Dec 2018 07:00  --------------------------------------------------------  IN:    dextrose 5% + sodium chloride 0.45% with potassium chloride 20 mEq/L: 2100 mL    IV PiggyBack: 450 mL  Total IN: 2550 mL    OUT:    Voided: 1650 mL  Total OUT: 1650 mL    Total NET: 900 mL      29 Dec 2018 07:01  -  29 Dec 2018 17:57  --------------------------------------------------------  IN:  Total IN: 0 mL    OUT:    Voided: 450 mL  Total OUT: 450 mL    Total NET: -450 mL          Physical Exam:  Gen: Laying in bed, NAD  Resp: Unlabored breathing  Abd: soft, mild TTP, mildly distended ,incision with some serosanguinous drainage   Ext: WWP  Skin: No rashes                                               8.7    9.18  )-----------( 389      ( 29 Dec 2018 07:05 )             28.5     12-29    140  |  102  |  8   ----------------------------<  115<H>  4.7   |  28  |  0.97    Ca    8.5      29 Dec 2018 07:05  Phos  2.4     12-29  Mg     2.2     12-29

## 2018-12-30 LAB
APTT BLD: 26.8 SEC — LOW (ref 27.5–36.3)
APTT BLD: 31.3 SEC — SIGNIFICANT CHANGE UP (ref 27.5–36.3)
BASE EXCESS BLDA CALC-SCNC: 3.3 MMOL/L — SIGNIFICANT CHANGE UP
BASE EXCESS BLDV CALC-SCNC: -1.2 MMOL/L — SIGNIFICANT CHANGE UP
BLD GP AB SCN SERPL QL: NEGATIVE — SIGNIFICANT CHANGE UP
BUN SERPL-MCNC: 7 MG/DL — SIGNIFICANT CHANGE UP (ref 7–23)
BUN SERPL-MCNC: 8 MG/DL — SIGNIFICANT CHANGE UP (ref 7–23)
CA-I BLD-SCNC: 1.05 MMOL/L — SIGNIFICANT CHANGE UP (ref 1.03–1.23)
CA-I BLDA-SCNC: 1.15 MMOL/L — SIGNIFICANT CHANGE UP (ref 1.15–1.29)
CALCIUM SERPL-MCNC: 8.4 MG/DL — SIGNIFICANT CHANGE UP (ref 8.4–10.5)
CALCIUM SERPL-MCNC: 9 MG/DL — SIGNIFICANT CHANGE UP (ref 8.4–10.5)
CHLORIDE SERPL-SCNC: 101 MMOL/L — SIGNIFICANT CHANGE UP (ref 98–107)
CHLORIDE SERPL-SCNC: 104 MMOL/L — SIGNIFICANT CHANGE UP (ref 98–107)
CO2 SERPL-SCNC: 19 MMOL/L — LOW (ref 22–31)
CO2 SERPL-SCNC: 28 MMOL/L — SIGNIFICANT CHANGE UP (ref 22–31)
CREAT SERPL-MCNC: 0.89 MG/DL — SIGNIFICANT CHANGE UP (ref 0.5–1.3)
CREAT SERPL-MCNC: 0.91 MG/DL — SIGNIFICANT CHANGE UP (ref 0.5–1.3)
GAS PNL BLDV: 136 MMOL/L — SIGNIFICANT CHANGE UP (ref 136–146)
GLUCOSE BLDA-MCNC: 134 MG/DL — HIGH (ref 70–99)
GLUCOSE BLDC GLUCOMTR-MCNC: 135 MG/DL — HIGH (ref 70–99)
GLUCOSE BLDC GLUCOMTR-MCNC: 181 MG/DL — HIGH (ref 70–99)
GLUCOSE BLDV-MCNC: 187 — HIGH (ref 70–99)
GLUCOSE SERPL-MCNC: 141 MG/DL — HIGH (ref 70–99)
GLUCOSE SERPL-MCNC: 183 MG/DL — HIGH (ref 70–99)
HCO3 BLDA-SCNC: 28 MMOL/L — HIGH (ref 22–26)
HCO3 BLDV-SCNC: 23 MMOL/L — SIGNIFICANT CHANGE UP (ref 20–27)
HCT VFR BLD CALC: 26.4 % — LOW (ref 34.5–45)
HCT VFR BLD CALC: 31.3 % — LOW (ref 34.5–45)
HCT VFR BLDA CALC: 26.4 % — LOW (ref 34.5–46.5)
HCT VFR BLDV CALC: 27.9 % — LOW (ref 34.5–45)
HGB BLD-MCNC: 8 G/DL — LOW (ref 11.5–15.5)
HGB BLD-MCNC: 9.3 G/DL — LOW (ref 11.5–15.5)
HGB BLDA-MCNC: 8.5 G/DL — LOW (ref 11.5–15.5)
HGB BLDV-MCNC: 9 G/DL — LOW (ref 11.5–15.5)
INR BLD: 0.98 — SIGNIFICANT CHANGE UP (ref 0.88–1.17)
INR BLD: 1.06 — SIGNIFICANT CHANGE UP (ref 0.88–1.17)
MAGNESIUM SERPL-MCNC: 1.7 MG/DL — SIGNIFICANT CHANGE UP (ref 1.6–2.6)
MAGNESIUM SERPL-MCNC: 2.1 MG/DL — SIGNIFICANT CHANGE UP (ref 1.6–2.6)
MCHC RBC-ENTMCNC: 29.7 % — LOW (ref 32–36)
MCHC RBC-ENTMCNC: 30.3 % — LOW (ref 32–36)
MCHC RBC-ENTMCNC: 30.7 PG — SIGNIFICANT CHANGE UP (ref 27–34)
MCHC RBC-ENTMCNC: 30.7 PG — SIGNIFICANT CHANGE UP (ref 27–34)
MCV RBC AUTO: 101.1 FL — HIGH (ref 80–100)
MCV RBC AUTO: 103.3 FL — HIGH (ref 80–100)
NRBC # FLD: 0 — SIGNIFICANT CHANGE UP
NRBC # FLD: 0 — SIGNIFICANT CHANGE UP
PCO2 BLDA: 33 MMHG — SIGNIFICANT CHANGE UP (ref 32–48)
PCO2 BLDV: 42 MMHG — SIGNIFICANT CHANGE UP (ref 41–51)
PH BLDA: 7.51 PH — HIGH (ref 7.35–7.45)
PH BLDV: 7.36 PH — SIGNIFICANT CHANGE UP (ref 7.32–7.43)
PHOSPHATE SERPL-MCNC: 3 MG/DL — SIGNIFICANT CHANGE UP (ref 2.5–4.5)
PHOSPHATE SERPL-MCNC: 3.4 MG/DL — SIGNIFICANT CHANGE UP (ref 2.5–4.5)
PLATELET # BLD AUTO: 399 K/UL — SIGNIFICANT CHANGE UP (ref 150–400)
PLATELET # BLD AUTO: 446 K/UL — HIGH (ref 150–400)
PMV BLD: 8.8 FL — SIGNIFICANT CHANGE UP (ref 7–13)
PMV BLD: 9 FL — SIGNIFICANT CHANGE UP (ref 7–13)
PO2 BLDA: 239 MMHG — HIGH (ref 83–108)
PO2 BLDV: 57 MMHG — HIGH (ref 35–40)
POTASSIUM BLDA-SCNC: 4.6 MMOL/L — HIGH (ref 3.4–4.5)
POTASSIUM BLDV-SCNC: 4.2 MMOL/L — SIGNIFICANT CHANGE UP (ref 3.4–4.5)
POTASSIUM SERPL-MCNC: 4.7 MMOL/L — SIGNIFICANT CHANGE UP (ref 3.5–5.3)
POTASSIUM SERPL-MCNC: 5.4 MMOL/L — HIGH (ref 3.5–5.3)
POTASSIUM SERPL-SCNC: 4.7 MMOL/L — SIGNIFICANT CHANGE UP (ref 3.5–5.3)
POTASSIUM SERPL-SCNC: 5.4 MMOL/L — HIGH (ref 3.5–5.3)
PROTHROM AB SERPL-ACNC: 10.9 SEC — SIGNIFICANT CHANGE UP (ref 9.8–13.1)
PROTHROM AB SERPL-ACNC: 12.1 SEC — SIGNIFICANT CHANGE UP (ref 9.8–13.1)
RBC # BLD: 2.61 M/UL — LOW (ref 3.8–5.2)
RBC # BLD: 3.03 M/UL — LOW (ref 3.8–5.2)
RBC # FLD: 14.7 % — HIGH (ref 10.3–14.5)
RBC # FLD: 14.8 % — HIGH (ref 10.3–14.5)
RH IG SCN BLD-IMP: POSITIVE — SIGNIFICANT CHANGE UP
SAO2 % BLDA: 98.5 % — SIGNIFICANT CHANGE UP (ref 95–99)
SAO2 % BLDV: 86.9 % — HIGH (ref 60–85)
SODIUM BLDA-SCNC: 137 MMOL/L — SIGNIFICANT CHANGE UP (ref 136–146)
SODIUM SERPL-SCNC: 137 MMOL/L — SIGNIFICANT CHANGE UP (ref 135–145)
SODIUM SERPL-SCNC: 141 MMOL/L — SIGNIFICANT CHANGE UP (ref 135–145)
WBC # BLD: 11.01 K/UL — HIGH (ref 3.8–10.5)
WBC # BLD: 9.92 K/UL — SIGNIFICANT CHANGE UP (ref 3.8–10.5)
WBC # FLD AUTO: 11.01 K/UL — HIGH (ref 3.8–10.5)
WBC # FLD AUTO: 9.92 K/UL — SIGNIFICANT CHANGE UP (ref 3.8–10.5)

## 2018-12-30 PROCEDURE — 99223 1ST HOSP IP/OBS HIGH 75: CPT | Mod: GC

## 2018-12-30 PROCEDURE — 71045 X-RAY EXAM CHEST 1 VIEW: CPT | Mod: 26

## 2018-12-30 RX ORDER — DEXMEDETOMIDINE HYDROCHLORIDE IN 0.9% SODIUM CHLORIDE 4 UG/ML
0.2 INJECTION INTRAVENOUS
Qty: 200 | Refills: 0 | Status: DISCONTINUED | OUTPATIENT
Start: 2018-12-30 | End: 2018-12-31

## 2018-12-30 RX ORDER — INSULIN GLARGINE 100 [IU]/ML
5 INJECTION, SOLUTION SUBCUTANEOUS AT BEDTIME
Qty: 0 | Refills: 0 | Status: DISCONTINUED | OUTPATIENT
Start: 2018-12-30 | End: 2019-01-02

## 2018-12-30 RX ORDER — FENTANYL CITRATE 50 UG/ML
1 INJECTION INTRAVENOUS
Qty: 2500 | Refills: 0 | Status: DISCONTINUED | OUTPATIENT
Start: 2018-12-30 | End: 2018-12-30

## 2018-12-30 RX ORDER — FENTANYL CITRATE 50 UG/ML
0.5 INJECTION INTRAVENOUS
Qty: 2500 | Refills: 0 | Status: DISCONTINUED | OUTPATIENT
Start: 2018-12-30 | End: 2018-12-31

## 2018-12-30 RX ORDER — SODIUM CHLORIDE 9 MG/ML
1000 INJECTION, SOLUTION INTRAVENOUS
Qty: 0 | Refills: 0 | Status: DISCONTINUED | OUTPATIENT
Start: 2018-12-30 | End: 2019-01-01

## 2018-12-30 RX ORDER — ACETAMINOPHEN 500 MG
1000 TABLET ORAL ONCE
Qty: 0 | Refills: 0 | Status: COMPLETED | OUTPATIENT
Start: 2018-12-30 | End: 2018-12-30

## 2018-12-30 RX ORDER — HYDROMORPHONE HYDROCHLORIDE 2 MG/ML
1 INJECTION INTRAMUSCULAR; INTRAVENOUS; SUBCUTANEOUS EVERY 4 HOURS
Qty: 0 | Refills: 0 | Status: DISCONTINUED | OUTPATIENT
Start: 2018-12-30 | End: 2018-12-30

## 2018-12-30 RX ADMIN — FENTANYL CITRATE 5.73 MICROGRAM(S)/KG/HR: 50 INJECTION INTRAVENOUS at 13:36

## 2018-12-30 RX ADMIN — Medication 400 MILLIGRAM(S): at 14:22

## 2018-12-30 RX ADMIN — HYDROMORPHONE HYDROCHLORIDE 0.5 MILLIGRAM(S): 2 INJECTION INTRAMUSCULAR; INTRAVENOUS; SUBCUTANEOUS at 02:53

## 2018-12-30 RX ADMIN — Medication 10 MILLIGRAM(S): at 05:02

## 2018-12-30 RX ADMIN — Medication 1000 MILLIGRAM(S): at 14:52

## 2018-12-30 RX ADMIN — HYDROMORPHONE HYDROCHLORIDE 1 MILLIGRAM(S): 2 INJECTION INTRAMUSCULAR; INTRAVENOUS; SUBCUTANEOUS at 00:00

## 2018-12-30 RX ADMIN — INSULIN GLARGINE 5 UNIT(S): 100 INJECTION, SOLUTION SUBCUTANEOUS at 23:30

## 2018-12-30 RX ADMIN — DEXMEDETOMIDINE HYDROCHLORIDE IN 0.9% SODIUM CHLORIDE 5.73 MICROGRAM(S)/KG/HR: 4 INJECTION INTRAVENOUS at 15:45

## 2018-12-30 RX ADMIN — Medication 8 MILLIGRAM(S): at 05:02

## 2018-12-30 RX ADMIN — Medication 5 MILLIGRAM(S): at 04:51

## 2018-12-30 RX ADMIN — PANTOPRAZOLE SODIUM 40 MILLIGRAM(S): 20 TABLET, DELAYED RELEASE ORAL at 18:33

## 2018-12-30 RX ADMIN — ALBUTEROL 1 PUFF(S): 90 AEROSOL, METERED ORAL at 21:14

## 2018-12-30 RX ADMIN — HYDROMORPHONE HYDROCHLORIDE 1 MILLIGRAM(S): 2 INJECTION INTRAMUSCULAR; INTRAVENOUS; SUBCUTANEOUS at 04:51

## 2018-12-30 RX ADMIN — HEPARIN SODIUM 5000 UNIT(S): 5000 INJECTION INTRAVENOUS; SUBCUTANEOUS at 05:02

## 2018-12-30 RX ADMIN — HYDROMORPHONE HYDROCHLORIDE 0.5 MILLIGRAM(S): 2 INJECTION INTRAMUSCULAR; INTRAVENOUS; SUBCUTANEOUS at 07:59

## 2018-12-30 RX ADMIN — HEPARIN SODIUM 5000 UNIT(S): 5000 INJECTION INTRAVENOUS; SUBCUTANEOUS at 13:52

## 2018-12-30 RX ADMIN — Medication 400 MILLIGRAM(S): at 20:00

## 2018-12-30 RX ADMIN — Medication 103 MICROGRAM(S): at 23:28

## 2018-12-30 RX ADMIN — Medication 1 SPRAY(S): at 05:01

## 2018-12-30 RX ADMIN — HYDROMORPHONE HYDROCHLORIDE 1 MILLIGRAM(S): 2 INJECTION INTRAMUSCULAR; INTRAVENOUS; SUBCUTANEOUS at 05:21

## 2018-12-30 RX ADMIN — Medication 600 MILLIGRAM(S): at 05:03

## 2018-12-30 RX ADMIN — Medication 1000 MILLIGRAM(S): at 20:30

## 2018-12-30 RX ADMIN — HYDROMORPHONE HYDROCHLORIDE 0.5 MILLIGRAM(S): 2 INJECTION INTRAMUSCULAR; INTRAVENOUS; SUBCUTANEOUS at 07:29

## 2018-12-30 RX ADMIN — HEPARIN SODIUM 5000 UNIT(S): 5000 INJECTION INTRAVENOUS; SUBCUTANEOUS at 23:30

## 2018-12-30 RX ADMIN — GABAPENTIN 400 MILLIGRAM(S): 400 CAPSULE ORAL at 05:03

## 2018-12-30 RX ADMIN — Medication 1: at 16:13

## 2018-12-30 RX ADMIN — Medication 8 MILLIGRAM(S): at 18:34

## 2018-12-30 RX ADMIN — PANTOPRAZOLE SODIUM 40 MILLIGRAM(S): 20 TABLET, DELAYED RELEASE ORAL at 05:02

## 2018-12-30 RX ADMIN — HYDROMORPHONE HYDROCHLORIDE 0.5 MILLIGRAM(S): 2 INJECTION INTRAMUSCULAR; INTRAVENOUS; SUBCUTANEOUS at 02:23

## 2018-12-30 RX ADMIN — SODIUM CHLORIDE 125 MILLILITER(S): 9 INJECTION, SOLUTION INTRAVENOUS at 13:32

## 2018-12-30 NOTE — CONSULT NOTE ADULT - ATTENDING COMMENTS
Current Issues:  T81.30XA Wound dehiscence   - s/p RTOR with placement of further retention sutures.  No SBR.    J98.9 Respiratory abnormalities   - attempting to wean, but patient noit ventilating sufficiently on PS trials, likely secondary to sedation.  Given her significant analgesia needs and the concern for further dehiscence, along with her baseline poor pulmonary function, will keep intubated on ventilator overnight  I27.20 Pulmonary hypertension   - as above.  Judicious use of fluids.  Keeping on vent overnight.    J44.9 Chronic obstructive pulmonary disease, unspecified COPD type   - Continuing previous meds  E11.9 Type 2 diabetes mellitus without complication, unspecified whether long term insulin use   - will half dose of lantus as she is NPO, continue RISS  E03.9 Hypothyroidism, unspecified type   - continue synthroid  M06.9 Rheumatoid arthritis, involving unspecified site, unspecified rheumatoid factor presence   - continuing corticosteroids  D64.9 Anemia, unspecified type   - no signs of clinically significant bleeding on exam  Z91.89 At risk for malnutrition   - NPO for now

## 2018-12-30 NOTE — BRIEF OPERATIVE NOTE - PROCEDURE
<<-----Click on this checkbox to enter Procedure Exploratory laparotomy  12/30/2018    Active  MELIA

## 2018-12-30 NOTE — PROGRESS NOTE ADULT - SUBJECTIVE AND OBJECTIVE BOX
S: Patient seen and examined.  No acute events overnight.  Pain controlled.  Reports having passed flatus.  Has yet to have a bowel movement.  Denies fever, chills, nausea, vomiting.      Vital Signs Last 24 Hrs  T(C): 37.6 (30 Dec 2018 12:09), Max: 38 (30 Dec 2018 08:32)  T(F): 99.7 (30 Dec 2018 12:09), Max: 100.4 (30 Dec 2018 08:32)  HR: 94 (30 Dec 2018 13:00) (83 - 98)  BP: 137/92 (30 Dec 2018 13:00) (124/71 - 173/89)  BP(mean): 102 (30 Dec 2018 13:00) (102 - 111)  RR: 14 (30 Dec 2018 13:00) (14 - 19)  SpO2: 98% (30 Dec 2018 13:00) (79% - 100%)    I&O's Detail    29 Dec 2018 07:01  -  30 Dec 2018 07:00  --------------------------------------------------------  IN:    dextrose 5% + sodium chloride 0.45% with potassium chloride 20 mEq/L: 600 mL    Oral Fluid: 150 mL  Total IN: 750 mL    OUT:    Voided: 2150 mL  Total OUT: 2150 mL    Total NET: -1400 mL      30 Dec 2018 07:01  -  30 Dec 2018 14:24  --------------------------------------------------------  IN:  Total IN: 0 mL    OUT:    Indwelling Catheter - Urethral: 225 mL  Total OUT: 225 mL    Total NET: -225 mL          Physical Exam:  Gen: Laying in bed, NAD  Resp: Unlabored breathing on 2L NC  Abd: soft, mild TTP, mildly distended , evisceration of bowel through midline incision  Ext: WWP  Skin: No rashes                                                           9.3    11.01 )-----------( 446      ( 30 Dec 2018 07:50 )             31.3     12-30    141  |  104  |  7   ----------------------------<  141<H>  5.4<H>   |  28  |  0.91    Ca    9.0      30 Dec 2018 07:50  Phos  3.0     12-30  Mg     2.1     12-30

## 2018-12-30 NOTE — PRE-OP CHECKLIST - BSA (M2)
Group Topic: Symptom Management    Start Time: 1015  End Time: 11:00 AM    Focus: Stages of Change  Number in attendance: 4  Patient learned about the stages of change and completed change plan worksheet.   Patient was quiet, but attentive, during group.   Patient Response: Attentive and Quiet   2.06 2.09

## 2018-12-30 NOTE — BRIEF OPERATIVE NOTE - PRE-OP DX
SBO (small bowel obstruction)  12/16/2018    Active  Osmani Billingsley
Evisceration of bowel  12/30/2018    Active  Laura Rm  SBO (small bowel obstruction)  12/16/2018    Active  Osmani Billingsley

## 2018-12-30 NOTE — CONSULT NOTE ADULT - ASSESSMENT
63y Female w hx COPD on 3L O2 at home, moderate pulm HTN, CHRIS, DM, RA on chronic prednisone, HTN, CKD, CVA, who presented this admission on 12/15 c/o epigastric pain, n/v w CT evidence suggesting SBO. Was admitted to surg and had ex-lap 12/16 w SHANNON but no transition point found; abdomen was closed w retention sutures. Pt was then kept briefly in SICU due to intentional delayed extubation given pulm comorbidities. Went to floor 12/18 where has been doing well since.   Today 12/30 pt was coughing and had sudden evisceration of bowels. Went to OR for another ex-lap w findings of intact, healthy bowel; was closed w 6 additional retention sutures. SICU consulted as pt was again intentionally kept extubated post-op due to concerns re pulm comorbidities. In the OR, pt was given 1500 cc crystalloid, had 300 cc uop; was given pushes of senia initially, and had temp 38.3 treated w tylenol (has known UTI). No pressor requirements at end of case. 63y Female w hx COPD on 3L O2 at home, moderate pulm HTN, CHRIS, DM, RA on chronic prednisone, HTN, CKD, CVA, who presented this admission on 12/15 c/o epigastric pain, n/v w CT evidence suggesting SBO. Was admitted to surg and had ex-lap 12/16 w SHANNON but no transition point found; abdomen was closed w retention sutures. Pt was then kept briefly in SICU due to intentional delayed extubation given pulm comorbidities. Went to floor 12/18 where has been doing well since.   Today 12/30 pt was coughing and had sudden evisceration of bowels. Went to OR for another ex-lap w findings of intact bowel; abdomen was closed w 6 additional retention sutures.   SICU consulted as pt was kept extubated post-op due to concerns re pulm comorbidities. In the OR, pt was given 1500 cc crystalloid, had 300 cc uop; was given pushes of senia initially, and had temp 38.3 treated w tylenol (has known UTI). No pressor requirements at end of case.    #neuro  -currently off sedation  -lethargic but arousable  -> keep sedation off in prep for extubation  -> pain ctrl prn    #resp  -currently intubated on vent (A/C, w , RR 14, FiO2 40, PEEP 5)  -ABG pre-op w metabolic alkalosis  -hx COPD and pulm HTN, current smoker  -> recheck ABG  -> wean down settings as tolerated in prep for possible extubation  -> c/w Proventil, Symbicort, Spiriva    #cv  -off pressors post-op  -HR 90s, bp 140s/70s  -hx HTN  -> metoprolol 5 q6 as tolerated  -> monitor uop, lactate    #GI  -s/p ex-lap today for evisceration, retention sutures placed  -NPO while on vent  -protonix 40 BID    #renal  -hx CKD  -BUN/Crt wnl  -> monitor I's&O's  -> repletions PRN  -> LR @ 125cc/h    #heme  -H/H stable during admission prior to OR  -> recheck post-op  -> sqh for dvt ppx    #endo  -hx RA on chronic prednisone, DM  -previously on taper as inpt, given stressdose hydrocortisone 100 in OR  -> c/w lantus 10, ISS  -> c/w methylpred    #dispo  -SICU 63y Female w hx COPD on 3L O2 at home, moderate pulm HTN, CHRIS, DM, RA on chronic prednisone, HTN, CKD, CVA, who presented this admission on 12/15 c/o epigastric pain, n/v w CT evidence suggesting SBO. Was admitted to surg and had ex-lap 12/16 w SHANNON but no transition point found; abdomen was closed w retention sutures. Pt was then kept briefly in SICU due to intentional delayed extubation given pulm comorbidities. Went to floor 12/18 where has been doing well since.   Today 12/30 pt was coughing and had sudden evisceration of bowels. Went to OR for another ex-lap w findings of intact bowel; abdomen was closed w 6 additional retention sutures.   SICU consulted as pt was kept extubated post-op due to concerns re pulm comorbidities. In the OR, pt was given 1500 cc crystalloid, had 300 cc uop; was given pushes of senia initially, and had temp 38.3 treated w tylenol (has known UTI). No pressor requirements at end of case.    #neuro  -currently on fentanyl gtt since OR  -lethargic but arousable  -> wean down sedation in prep for cpap trial and possible extubation  -> pain ctrl prn    #resp  -currently intubated on vent (A/C, w , RR 14, FiO2 40, PEEP 5)  -ABG pre-op w metabolic alkalosis  -hx COPD and pulm HTN, current smoker  -> recheck ABG  -> wean down settings as tolerated in prep for possible extubation  -> c/w Proventil, Symbicort, Spiriva    #cv  -off pressors post-op  -HR 90s, bp 140s/70s  -hx HTN  -> metoprolol 5 q6 as tolerated  -> monitor uop, lactate    #GI  -s/p ex-lap today for evisceration, retention sutures placed  -NPO while on vent  -protonix 40 BID    #renal  -hx CKD  -BUN/Crt wnl  -> monitor I's&O's  -> repletions PRN  -> LR @ 125cc/h    #heme  -H/H stable during admission prior to OR  -> recheck post-op  -> sqh for dvt ppx    #endo  -hx RA on chronic prednisone, DM  -previously on taper as inpt, given stressdose hydrocortisone 100 in OR  -> c/w lantus 10, ISS  -> c/w methylpred    #dispo  -SICU 63y Female w hx COPD on 3L O2 at home, moderate pulm HTN, CHRIS, DM, RA on chronic prednisone, HTN, CKD, CVA, who presented this admission on 12/15 c/o epigastric pain, n/v w CT evidence suggesting SBO. Was admitted to surg and had ex-lap 12/16 w SHANNON but no transition point found; abdomen was closed w retention sutures. Pt was then kept briefly in SICU due to intentional delayed extubation given pulm comorbidities. Went to floor 12/18 where has been doing well since.   Today 12/30 pt was coughing and had sudden evisceration of bowels. Went to OR for another ex-lap w findings of intact bowel; abdomen was closed w 6 additional retention sutures.   SICU consulted as pt was kept extubated post-op due to concerns re pulm comorbidities. In the OR, pt was given 1500 cc crystalloid, had 300 cc uop; was given pushes of senia initially, and had temp 38.3 treated w tylenol (has known UTI). No pressor requirements at end of case.    #neuro  -currently on fentanyl gtt since OR  -lethargic but arousable  -> wean down sedation in prep for cpap trial and possible extubation  -> pain ctrl prn    #resp  -currently intubated on vent (A/C, w , RR 14, FiO2 40, PEEP 5)  -ABG pre-op w metabolic alkalosis  -hx COPD and pulm HTN, current smoker  -> recheck ABG  -> wean down settings as tolerated in prep for possible extubation  -> c/w Proventil, Symbicort, Spiriva    #cv  -off pressors post-op  -HR 90s, bp 140s/70s  -hx HTN  -> metoprolol 5 q6 as tolerated  -> monitor uop, lactate    #GI  -s/p ex-lap today for evisceration, retention sutures placed  -NPO while on vent  -protonix 40 BID    #renal  -hx CKD  -BUN/Crt wnl  -> monitor I's&O's  -> repletions PRN  -> LR @ 125cc/h    #heme  -H/H stable during admission prior to OR  -> recheck post-op  -> sqh for dvt ppx    #ID  -T 100.4 this am  -wbc 11-has been on ceftriaxone for uti since 12/26  -ucx pos for ecoli 12/20  -> will hold abx at this time pending signs sepsis     #endo  -hx RA on chronic prednisone, DM  -previously on taper as inpt, given stressdose hydrocortisone 100 in OR  -> c/w lantus 10, ISS  -> c/w methylpred    #dispo  -SICU 63y Female w hx COPD on 3L O2 at home, moderate pulm HTN, CHRIS, DM, RA on chronic prednisone, HTN, CKD, CVA, who presented this admission on 12/15 c/o epigastric pain, n/v w CT evidence suggesting SBO. Was admitted to surg and had ex-lap 12/16 w SHANNON but no transition point found; abdomen was closed w retention sutures. Pt was then kept briefly in SICU due to intentional delayed extubation given pulm comorbidities. Went to floor 12/18 where has been doing well since.   Today 12/30 pt was coughing and had sudden evisceration of bowels. Went to OR for another ex-lap w findings of intact bowel; abdomen was closed w 6 additional retention sutures.   SICU consulted as pt was kept extubated post-op due to concerns re pulm comorbidities. In the OR, pt was given 1500 cc crystalloid, had 300 cc uop; was given pushes of senia initially, and had temp 38.3 treated w tylenol (has known UTI). No pressor requirements at end of case.    #neuro  -currently on fentanyl gtt since OR  -lethargic but arousable  -> wean down sedation in prep for cpap trial and possible extubation  -> pain ctrl prn    #resp  -currently intubated on vent (A/C, w , RR 14, FiO2 40, PEEP 5)  -ABG pre-op w metabolic alkalosis  -hx COPD and pulm HTN, current smoker  -> recheck ABG  -> wean down settings as tolerated in prep for possible extubation  -> c/w Proventil, Symbicort, Spiriva    #cv  -off pressors post-op  -HR 90s, bp 140s/70s  -hx HTN  -also takes lasix at home, held recently per pulm recs given pt w/o current signs pulm edema  -> metoprolol 5 q6 as tolerated  -> monitor uop, lactate    #GI  -s/p ex-lap today for evisceration, retention sutures placed  -NPO while on vent  -protonix 40 BID    #renal  -hx CKD  -BUN/Crt wnl  -> monitor I's&O's  -> repletions PRN  -> LR @ 125cc/h    #heme  -H/H stable during admission prior to OR  -> recheck post-op  -> sqh for dvt ppx    #ID  -T 100.4 this am  -wbc 11-has been on ceftriaxone for uti since 12/26  -ucx pos for ecoli 12/20  -> will hold abx at this time pending signs sepsis     #endo  -hx RA on chronic prednisone, DM  -previously on taper as inpt, given stressdose hydrocortisone 100 in OR  -> c/w lantus 10, ISS  -> c/w methylpred    #dispo  -SICU

## 2018-12-30 NOTE — PRE-OP CHECKLIST - 2.
under breast, michael area and rishabh panus- skin IAD under breast, michael area and under panus- skin IAD

## 2018-12-30 NOTE — CHART NOTE - NSCHARTNOTEFT_GEN_A_CORE
Post Operative Note      Procedure: Exploratory laparotomy, closure with external retention sutures for evisceration    Subjective: Pt seen and examined at bedside.  No acute events since OR.  Remains intubated, sedated.      Objective:    T(C): 37.6 (12-30-18 @ 12:09), Max: 38 (12-30-18 @ 08:32)  HR: 105 (12-30-18 @ 16:00) (83 - 126)  BP: 118/71 (12-30-18 @ 16:00) (118/71 - 173/89)  RR: 14 (12-30-18 @ 16:00) (14 - 19)  SpO2: 98% (12-30-18 @ 16:00) (79% - 100%)      12-29-18 @ 07:01  -  12-30-18 @ 07:00  --------------------------------------------------------  IN: 750 mL / OUT: 2150 mL / NET: -1400 mL    12-30-18 @ 07:01  -  12-30-18 @ 16:33  --------------------------------------------------------  IN: 0 mL / OUT: 225 mL / NET: -225 mL        Physical Exam:  Gen: Intubated, sedated, iraheta in place draining clear urine  Abdomen: soft, distended, dressing c/d/i, wounds hemostatic    Assessment/Plan: 63F w/ postoperative course c/b evisceration of bowel now s/p Exploratory laparotomy, closure with external retention sutures for evisceration.    - Pain control  - Monitor VS  - Monitor UoP  - Appreciate care as per SICU

## 2018-12-30 NOTE — BRIEF OPERATIVE NOTE - POST-OP DX
Evisceration of bowel  12/30/2018    Active  Laura Rm  SBO (small bowel obstruction)  12/16/2018    Active  Osmani Billingsley
SBO (small bowel obstruction)  12/16/2018    Active  Osmani Billingsley

## 2018-12-30 NOTE — BRIEF OPERATIVE NOTE - OPERATION/FINDINGS
Procedure: Exploratory laparotomy, closure with external retention sutures for evisceration
part of intestine congested and dilated, adhesions released, no specific transitions point identified, the entire small bowel was run from DJ to TI, all appears healthy. abdomen closed with interrupted sutures and external retention suture.

## 2018-12-30 NOTE — PROGRESS NOTE ADULT - ASSESSMENT
63F w/ multiple respiratory comorbidities p/w SBO s/p ex-lap with findings of healthy bowel c/b evisceration of bowel.    - Stat labs  - RTOR for ex lap, reduction of herniated bowel  - NPO

## 2018-12-30 NOTE — CONSULT NOTE ADULT - SUBJECTIVE AND OBJECTIVE BOX
HISTORY OF PRESENT ILLNESS:  AFTAB BASS is a 63y Female w hx COPD on 3L O2 at home, moderate pulm HTN, CHRIS, DM, RA on chronic prednisone, HTN, CKD, CVA, who presented this admission on 12/15 c/o epigastric pain, n/v w CT evidence suggesting SBO. Was admitted to surg and had ex-lap  w SHANNON but no transition point found; abdomen was closed w retention sutures. Pt was then kept briefly in SICU due to intentional delayed extubation given pulm comorbidities. Went to floor  where has been doing well since.   Today  pt was coughing and had sudden evisceration of bowels. Went to OR for another ex-lap w findings of intact, healthy bowel; was closed w 6 additional retention sutures. SICU consulted as pt was again intentionally kept extubated post-op due to concerns re pulm comorbidities. In the OR, pt was given 1500 cc crystalloid, had 300 cc uop; was given pushes of senia initially, and had temp 38.3 treated w tylenol (has known UTI). No pressor requirements at end of case.    PAST MEDICAL HISTORY: Diverticulosis  Cellulitis  CVA (Cerebral Vascular Accident)  RA (Rheumatoid Arthritis)  Tooth Abscess  Arthritis  Cigarette Smoker  Chronic Asthma  Adult Hypothyroidism  Borderline Diabetes Mellitus  High Cholesterol  H/O: HTN      PAST SURGICAL HISTORY: Wrist Disorder  History of  Section      FAMILY HISTORY: No pertinent family history in first degree relatives      SOCIAL HISTORY: current smoker    CODE STATUS: full code    HOME MEDICATIONS:  * Patient Currently Takes Medications as of 31-May-2018 14:10 documented in Structured Notes  · 	pantoprazole 40 mg oral delayed release tablet: 1 tab(s) orally once a day (before a meal)  · 	guaiFENesin 600 mg oral tablet, extended release: 1 tab(s) orally every 12 hours for 7 days  · 	gabapentin 300 mg oral capsule: 1 cap(s) orally 2 times a day  · 	furosemide 20 mg oral tablet: 1 tab(s) orally once a day in the evening  · 	albuterol 90 mcg/inh inhalation aerosol: 1 puff(s) inhaled every 4 hours as needed  · 	Portable Oxygen Concentrator:   · 	Advair Diskus 250 mcg-50 mcg inhalation powder: 1 puff(s) inhaled 2 times a day   · 	sodium chloride 0.65% nasal spray: 1 spray(s) nasal 4 times a day for 1 week  · 	fluticasone 50 mcg/inh nasal spray: 1 spray(s) nasal 2 times a day for 1 week  · 	levothyroxine 125 mcg (0.125 mg) oral tablet: 1 tab(s) orally once a day  · 	sulfaSALAzine 500 mg oral tablet: 1 tab(s) orally 2 times a day  · 	acetaminophen 325 mg oral tablet: 2 tab(s) orally every 6 hours as needed  · 	polyethylene glycol 3350 oral powder for reconstitution: 17 gram(s) orally once a day  · 	docusate sodium 100 mg oral capsule: 1 cap(s) orally 3 times a day  · 	senna oral tablet: 2 tab(s) orally once a day (at bedtime)  · 	SITagliptin 50 mg oral tablet: 1 tab(s) orally once a day   · 	Tresiba FlexTouch 100 units/mL subcutaneous solution: 18 unit(s) subcutaneous once a day (at bedtime)   · 	tiotropium 18 mcg inhalation capsule: 1 cap(s) inhaled once a day  · 	amLODIPine 5 mg oral tablet: 1 tab(s) orally once a day  · 	furosemide 40 mg oral tablet: 1 tab(s) orally once a day in the morning  · 	oxybutynin 5 mg oral tablet: 1 tab(s) orally 2 times a day  · 	clopidogrel 75 mg oral tablet: 1 tab(s) orally once a day  · 	loratadine 10 mg oral tablet: 1 tab(s) orally once a day  · 	traMADol 50 mg oral tablet: 1.5 tab(s) orally 3 times a day as needed for pain  · 	predniSONE 10 mg oral tablet: 1 tab(s) orally once a day  · 	simvastatin 20 mg oral tablet: 1 tab(s) orally once a day (at bedtime)  · 	alendronate: 1 tab(s) orally once a week  · 	Toprol-XL 50 mg oral tablet, extended release: 1 tab(s) orally once a day    ALLERGIES: No Known Allergies      VITAL SIGNS:  ICU Vital Signs Last 24 Hrs  T(C): 37.6 (30 Dec 2018 12:09), Max: 38 (30 Dec 2018 08:32)  T(F): 99.7 (30 Dec 2018 12:09), Max: 100.4 (30 Dec 2018 08:32)  HR: 91 (30 Dec 2018 12:33) (83 - 98)  BP: 147/100 (30 Dec 2018 12:30) (124/71 - 173/89)  BP(mean): 109 (30 Dec 2018 12:30) (109 - 111)  ABP: --  ABP(mean): --  RR: 16 (30 Dec 2018 12:30) (16 - 19)  SpO2: 100% (30 Dec 2018 12:33) (79% - 100%)      NEURO  Exam:  Meds:fentaNYL   Infusion 0.5 MICROgram(s)/kG/Hr IV Continuous <Continuous>      RESPIRATORY  Mechanical Ventilation: Mode: AC/ CMV (Assist Control/ Continuous Mandatory Ventilation), RR (machine): 14, RR (patient): 14, TV (machine): 500, FiO2: 40, PEEP: 5, ITime: 0.8, MAP: 13, PIP: 34  ABG - ( 30 Dec 2018 10:23 )  pH: 7.51  /  pCO2: 33    /  pO2: 239   / HCO3: 28    / Base Excess: 3.3   /  SaO2: 98.5    Lactate: x                Exam:  Meds:ALBUTerol    90 MICROgram(s) HFA Inhaler 1 Puff(s) Inhalation every 4 hours PRN Shortness of Breath and/or Wheezing  buDESOnide 160 MICROgram(s)/formoterol 4.5 MICROgram(s) Inhaler 2 Puff(s) Inhalation two times a day  tiotropium 18 MICROgram(s) Capsule 1 Capsule(s) Inhalation daily      CARDIOVASCULAR    Exam:  Cardiac Rhythm:  Meds:metoprolol tartrate Injectable 5 milliGRAM(s) IV Push every 6 hours      GI/NUTRITION  Exam:  Diet:  Meds:pantoprazole  Injectable 40 milliGRAM(s) IV Push two times a day      GENITOURINARY/RENAL  Meds:lactated ringers. 1000 milliLiter(s) IV Continuous <Continuous>       @ 07:01  -   @ 07:00  --------------------------------------------------------  IN:    dextrose 5% + sodium chloride 0.45% with potassium chloride 20 mEq/L: 600 mL    Oral Fluid: 150 mL  Total IN: 750 mL    OUT:    Voided: 2150 mL  Total OUT: 2150 mL    Total NET: -1400 mL        Weight (kg): 114.6 ( @ 07:58)      141  |  104  |  7   ----------------------------<  141<H>  5.4<H>   |  28  |  0.91    Ca    9.0      30 Dec 2018 07:50  Phos  3.0     12-30  Mg     2.1           [ ] Antoine catheter, indication: urine output monitoring in critically ill patient    HEMATOLOGIC  [ ] VTE Prophylaxis:  clopidogrel Tablet 75 milliGRAM(s) Oral daily  heparin  Injectable 5000 Unit(s) SubCutaneous every 8 hours                          9.3    11.01 )-----------( 446      ( 30 Dec 2018 07:50 )             31.3     PT/INR - ( 30 Dec 2018 07:50 )   PT: 10.9 SEC;   INR: 0.98          PTT - ( 30 Dec 2018 07:50 )  PTT:31.3 SEC  Transfusion: [ ] PRBC	[ ] Platelets	[ ] FFP	[ ] Cryoprecipitate      INFECTIOUS DISEASES  Meds:cefTRIAXone   IVPB 1 Gram(s) IV Intermittent every 24 hours  influenza   Vaccine 0.5 milliLiter(s) IntraMuscular once    RECENT CULTURES:      ENDOCRINE  Meds:dextrose 50% Injectable 25 milliLiter(s) IV Push once  dextrose 50% Injectable 12.5 Gram(s) IV Push once  glucagon  Injectable 1 milliGRAM(s) IntraMuscular once PRN  insulin glargine Injectable (LANTUS) 10 Unit(s) SubCutaneous at bedtime  insulin lispro (HumaLOG) corrective regimen sliding scale   SubCutaneous three times a day before meals  insulin lispro (HumaLOG) corrective regimen sliding scale   SubCutaneous at bedtime  levothyroxine Injectable 103 MICROGram(s) IV Push at bedtime  methylPREDNISolone sodium succinate Injectable 8 milliGRAM(s) IV Push every 12 hours    CAPILLARY BLOOD GLUCOSE      POCT Blood Glucose.: 135 mg/dL (30 Dec 2018 07:41)  POCT Blood Glucose.: 133 mg/dL (29 Dec 2018 21:18)  POCT Blood Glucose.: 179 mg/dL (29 Dec 2018 17:08)      PATIENT CARE ACCESS DEVICES:  [ ] Peripheral IV  [ ] Central Venous Line	[ ] R	[ ] L	[ ] IJ	[ ] Fem	[ ] SC	Placed:   [ ] Arterial Line		[ ] R	[ ] L	[ ] Fem	[ ] Rad	[ ] Ax	Placed:   [ ] PICC:					[ ] Mediport  [ ] Urinary Catheter, Date Placed:   [x] Necessity of urinary, arterial, and venous catheters discussed    OTHER MEDICATIONS: fluticasone propionate 50 MICROgram(s)/spray Nasal Spray 1 Spray(s) Both Nostrils two times a day  sodium chloride 0.65% Nasal 1 Spray(s) Both Nostrils every 6 hours PRN      IMAGING STUDIES: HISTORY OF PRESENT ILLNESS:  AFTAB BASS is a 63y Female w hx COPD on 3L O2 at home, moderate pulm HTN, CHRIS, DM, RA on chronic prednisone, HTN, CKD, CVA, who presented this admission on 12/15 c/o epigastric pain, n/v w CT evidence suggesting SBO. Was admitted to surg and had ex-lap  w SHANNON but no transition point found; abdomen was closed w retention sutures. Pt was then kept briefly in SICU due to intentional delayed extubation given pulm comorbidities. Went to floor  where has been doing well since.   Today  pt was coughing and had sudden evisceration of bowels. Went to OR for another ex-lap w findings of intact, healthy bowel; was closed w 6 additional retention sutures. SICU consulted as pt was again intentionally kept extubated post-op due to concerns re pulm comorbidities. In the OR, pt was given 1500 cc crystalloid, had 300 cc uop; was given pushes of senia initially, and had temp 38.3 treated w tylenol (has known UTI). No pressor requirements at end of case.    PAST MEDICAL HISTORY: Diverticulosis  Cellulitis  CVA (Cerebral Vascular Accident)  RA (Rheumatoid Arthritis)  Tooth Abscess  Arthritis  Cigarette Smoker  Chronic Asthma  Adult Hypothyroidism  Borderline Diabetes Mellitus  High Cholesterol  H/O: HTN      PAST SURGICAL HISTORY: Wrist Disorder  History of  Section      FAMILY HISTORY: No pertinent family history in first degree relatives      SOCIAL HISTORY: current smoker    CODE STATUS: full code    HOME MEDICATIONS:  * Patient Currently Takes Medications as of 31-May-2018 14:10 documented in Structured Notes  · 	pantoprazole 40 mg oral delayed release tablet: 1 tab(s) orally once a day (before a meal)  · 	guaiFENesin 600 mg oral tablet, extended release: 1 tab(s) orally every 12 hours for 7 days  · 	gabapentin 300 mg oral capsule: 1 cap(s) orally 2 times a day  · 	furosemide 20 mg oral tablet: 1 tab(s) orally once a day in the evening  · 	albuterol 90 mcg/inh inhalation aerosol: 1 puff(s) inhaled every 4 hours as needed  · 	Portable Oxygen Concentrator:   · 	Advair Diskus 250 mcg-50 mcg inhalation powder: 1 puff(s) inhaled 2 times a day   · 	sodium chloride 0.65% nasal spray: 1 spray(s) nasal 4 times a day for 1 week  · 	fluticasone 50 mcg/inh nasal spray: 1 spray(s) nasal 2 times a day for 1 week  · 	levothyroxine 125 mcg (0.125 mg) oral tablet: 1 tab(s) orally once a day  · 	sulfaSALAzine 500 mg oral tablet: 1 tab(s) orally 2 times a day  · 	acetaminophen 325 mg oral tablet: 2 tab(s) orally every 6 hours as needed  · 	polyethylene glycol 3350 oral powder for reconstitution: 17 gram(s) orally once a day  · 	docusate sodium 100 mg oral capsule: 1 cap(s) orally 3 times a day  · 	senna oral tablet: 2 tab(s) orally once a day (at bedtime)  · 	SITagliptin 50 mg oral tablet: 1 tab(s) orally once a day   · 	Tresiba FlexTouch 100 units/mL subcutaneous solution: 18 unit(s) subcutaneous once a day (at bedtime)   · 	tiotropium 18 mcg inhalation capsule: 1 cap(s) inhaled once a day  · 	amLODIPine 5 mg oral tablet: 1 tab(s) orally once a day  · 	furosemide 40 mg oral tablet: 1 tab(s) orally once a day in the morning  · 	oxybutynin 5 mg oral tablet: 1 tab(s) orally 2 times a day  · 	clopidogrel 75 mg oral tablet: 1 tab(s) orally once a day  · 	loratadine 10 mg oral tablet: 1 tab(s) orally once a day  · 	traMADol 50 mg oral tablet: 1.5 tab(s) orally 3 times a day as needed for pain  · 	predniSONE 10 mg oral tablet: 1 tab(s) orally once a day  · 	simvastatin 20 mg oral tablet: 1 tab(s) orally once a day (at bedtime)  · 	alendronate: 1 tab(s) orally once a week  · 	Toprol-XL 50 mg oral tablet, extended release: 1 tab(s) orally once a day    ALLERGIES: No Known Allergies      VITAL SIGNS:  ICU Vital Signs Last 24 Hrs  T(C): 37.6 (30 Dec 2018 12:09), Max: 38 (30 Dec 2018 08:32)  T(F): 99.7 (30 Dec 2018 12:09), Max: 100.4 (30 Dec 2018 08:32)  HR: 91 (30 Dec 2018 12:33) (83 - 98)  BP: 147/100 (30 Dec 2018 12:30) (124/71 - 173/89)  BP(mean): 109 (30 Dec 2018 12:30) (109 - 111)  ABP: --  ABP(mean): --  RR: 16 (30 Dec 2018 12:30) (16 - 19)  SpO2: 100% (30 Dec 2018 12:33) (79% - 100%)      NEURO  Exam: lethargic post-op but arousable, alert  Meds:fentaNYL   Infusion 0.5 MICROgram(s)/kG/Hr IV Continuous <Continuous>      RESPIRATORY  Mechanical Ventilation: Mode: AC/ CMV (Assist Control/ Continuous Mandatory Ventilation), RR (machine): 14, RR (patient): 14, TV (machine): 500, FiO2: 40, PEEP: 5, ITime: 0.8, MAP: 13, PIP: 34  ABG - ( 30 Dec 2018 10:23 )  pH: 7.51  /  pCO2: 33    /  pO2: 239   / HCO3: 28    / Base Excess: 3.3   /  SaO2: 98.5    Lactate: x                Exam: intubated; coarse lung sounds throughout  Meds:ALBUTerol    90 MICROgram(s) HFA Inhaler 1 Puff(s) Inhalation every 4 hours PRN Shortness of Breath and/or Wheezing  buDESOnide 160 MICROgram(s)/formoterol 4.5 MICROgram(s) Inhaler 2 Puff(s) Inhalation two times a day  tiotropium 18 MICROgram(s) Capsule 1 Capsule(s) Inhalation daily      CARDIOVASCULAR    Exam: S1S2  Cardiac Rhythm: NSR  Meds:metoprolol tartrate Injectable 5 milliGRAM(s) IV Push every 6 hours      GI/NUTRITION  Exam: soft, NT, mildly distended; dressing appears CDI  Diet: NPO  Meds:pantoprazole  Injectable 40 milliGRAM(s) IV Push two times a day      GENITOURINARY/RENAL  Meds:lactated ringers. 1000 milliLiter(s) IV Continuous <Continuous>       @ 07:01  -   @ 07:00  --------------------------------------------------------  IN:    dextrose 5% + sodium chloride 0.45% with potassium chloride 20 mEq/L: 600 mL    Oral Fluid: 150 mL  Total IN: 750 mL    OUT:    Voided: 2150 mL  Total OUT: 2150 mL    Total NET: -1400 mL        Weight (kg): 114.6 ( @ 07:58)      141  |  104  |  7   ----------------------------<  141<H>  5.4<H>   |  28  |  0.91    Ca    9.0      30 Dec 2018 07:50  Phos  3.0       Mg     2.1           [ ] Antoine catheter, indication: urine output monitoring in critically ill patient    HEMATOLOGIC  [ ] VTE Prophylaxis:  clopidogrel Tablet 75 milliGRAM(s) Oral daily  heparin  Injectable 5000 Unit(s) SubCutaneous every 8 hours                          9.3    11.01 )-----------( 446      ( 30 Dec 2018 07:50 )             31.3     PT/INR - ( 30 Dec 2018 07:50 )   PT: 10.9 SEC;   INR: 0.98          PTT - ( 30 Dec 2018 07:50 )  PTT:31.3 SEC  Transfusion: [ ] PRBC	[ ] Platelets	[ ] FFP	[ ] Cryoprecipitate      INFECTIOUS DISEASES  Meds:cefTRIAXone   IVPB 1 Gram(s) IV Intermittent every 24 hours  influenza   Vaccine 0.5 milliLiter(s) IntraMuscular once    RECENT CULTURES:      ENDOCRINE  Meds:dextrose 50% Injectable 25 milliLiter(s) IV Push once  dextrose 50% Injectable 12.5 Gram(s) IV Push once  glucagon  Injectable 1 milliGRAM(s) IntraMuscular once PRN  insulin glargine Injectable (LANTUS) 10 Unit(s) SubCutaneous at bedtime  insulin lispro (HumaLOG) corrective regimen sliding scale   SubCutaneous three times a day before meals  insulin lispro (HumaLOG) corrective regimen sliding scale   SubCutaneous at bedtime  levothyroxine Injectable 103 MICROGram(s) IV Push at bedtime  methylPREDNISolone sodium succinate Injectable 8 milliGRAM(s) IV Push every 12 hours    CAPILLARY BLOOD GLUCOSE      POCT Blood Glucose.: 135 mg/dL (30 Dec 2018 07:41)  POCT Blood Glucose.: 133 mg/dL (29 Dec 2018 21:18)  POCT Blood Glucose.: 179 mg/dL (29 Dec 2018 17:08)      PATIENT CARE ACCESS DEVICES:  [x] Peripheral IV  [ ] Central Venous Line	[ ] R	[ ] L	[ ] IJ	[ ] Fem	[ ] SC	Placed:   [ ] Arterial Line		[ ] R	[ ] L	[ ] Fem	[ ] Rad	[ ] Ax	Placed:   [ ] PICC:					[ ] Mediport  [ ] Urinary Catheter, Date Placed:   [x] Necessity of urinary, arterial, and venous catheters discussed    OTHER MEDICATIONS: fluticasone propionate 50 MICROgram(s)/spray Nasal Spray 1 Spray(s) Both Nostrils two times a day  sodium chloride 0.65% Nasal 1 Spray(s) Both Nostrils every 6 hours PRN      IMAGING STUDIES:

## 2018-12-31 LAB
BASE EXCESS BLDA CALC-SCNC: 0.5 MMOL/L — SIGNIFICANT CHANGE UP
BASE EXCESS BLDA CALC-SCNC: 0.7 MMOL/L — SIGNIFICANT CHANGE UP
BASE EXCESS BLDA CALC-SCNC: 1.9 MMOL/L — SIGNIFICANT CHANGE UP
BUN SERPL-MCNC: 8 MG/DL — SIGNIFICANT CHANGE UP (ref 7–23)
CA-I BLD-SCNC: 1.04 MMOL/L — SIGNIFICANT CHANGE UP (ref 1.03–1.23)
CA-I BLDA-SCNC: 1.12 MMOL/L — LOW (ref 1.15–1.29)
CA-I BLDA-SCNC: 1.15 MMOL/L — SIGNIFICANT CHANGE UP (ref 1.15–1.29)
CALCIUM SERPL-MCNC: 7.8 MG/DL — LOW (ref 8.4–10.5)
CHLORIDE BLDA-SCNC: 110 MMOL/L — HIGH (ref 96–108)
CHLORIDE SERPL-SCNC: 103 MMOL/L — SIGNIFICANT CHANGE UP (ref 98–107)
CO2 SERPL-SCNC: 22 MMOL/L — SIGNIFICANT CHANGE UP (ref 22–31)
CREAT SERPL-MCNC: 0.96 MG/DL — SIGNIFICANT CHANGE UP (ref 0.5–1.3)
GLUCOSE BLDA-MCNC: 111 MG/DL — HIGH (ref 70–99)
GLUCOSE BLDA-MCNC: 121 MG/DL — HIGH (ref 70–99)
GLUCOSE BLDA-MCNC: 126 MG/DL — HIGH (ref 70–99)
GLUCOSE BLDC GLUCOMTR-MCNC: 102 MG/DL — HIGH (ref 70–99)
GLUCOSE BLDC GLUCOMTR-MCNC: 105 MG/DL — HIGH (ref 70–99)
GLUCOSE BLDC GLUCOMTR-MCNC: 125 MG/DL — HIGH (ref 70–99)
GLUCOSE BLDC GLUCOMTR-MCNC: 131 MG/DL — HIGH (ref 70–99)
GLUCOSE BLDC GLUCOMTR-MCNC: 169 MG/DL — HIGH (ref 70–99)
GLUCOSE SERPL-MCNC: 112 MG/DL — HIGH (ref 70–99)
HCO3 BLDA-SCNC: 25 MMOL/L — SIGNIFICANT CHANGE UP (ref 22–26)
HCO3 BLDA-SCNC: 25 MMOL/L — SIGNIFICANT CHANGE UP (ref 22–26)
HCO3 BLDA-SCNC: 26 MMOL/L — SIGNIFICANT CHANGE UP (ref 22–26)
HCT VFR BLD CALC: 24.2 % — LOW (ref 34.5–45)
HCT VFR BLDA CALC: 26 % — LOW (ref 34.5–46.5)
HCT VFR BLDA CALC: 26.6 % — LOW (ref 34.5–46.5)
HCT VFR BLDA CALC: 45.1 % — SIGNIFICANT CHANGE UP (ref 34.5–46.5)
HGB BLD-MCNC: 7.7 G/DL — LOW (ref 11.5–15.5)
HGB BLDA-MCNC: 14.7 G/DL — SIGNIFICANT CHANGE UP (ref 11.5–15.5)
HGB BLDA-MCNC: 8.4 G/DL — LOW (ref 11.5–15.5)
HGB BLDA-MCNC: 8.6 G/DL — LOW (ref 11.5–15.5)
LACTATE BLDA-SCNC: 1.1 MMOL/L — SIGNIFICANT CHANGE UP (ref 0.5–2)
MAGNESIUM SERPL-MCNC: 1.6 MG/DL — SIGNIFICANT CHANGE UP (ref 1.6–2.6)
MCHC RBC-ENTMCNC: 31.4 PG — SIGNIFICANT CHANGE UP (ref 27–34)
MCHC RBC-ENTMCNC: 31.8 % — LOW (ref 32–36)
MCV RBC AUTO: 98.8 FL — SIGNIFICANT CHANGE UP (ref 80–100)
NRBC # FLD: 0 — SIGNIFICANT CHANGE UP
PCO2 BLDA: 33 MMHG — SIGNIFICANT CHANGE UP (ref 32–48)
PCO2 BLDA: 33 MMHG — SIGNIFICANT CHANGE UP (ref 32–48)
PCO2 BLDA: 43 MMHG — SIGNIFICANT CHANGE UP (ref 32–48)
PH BLDA: 7.38 PH — SIGNIFICANT CHANGE UP (ref 7.35–7.45)
PH BLDA: 7.48 PH — HIGH (ref 7.35–7.45)
PH BLDA: 7.49 PH — HIGH (ref 7.35–7.45)
PHOSPHATE SERPL-MCNC: 2.7 MG/DL — SIGNIFICANT CHANGE UP (ref 2.5–4.5)
PLATELET # BLD AUTO: 360 K/UL — SIGNIFICANT CHANGE UP (ref 150–400)
PMV BLD: 9 FL — SIGNIFICANT CHANGE UP (ref 7–13)
PO2 BLDA: 139 MMHG — HIGH (ref 83–108)
PO2 BLDA: 62 MMHG — LOW (ref 83–108)
PO2 BLDA: 77 MMHG — LOW (ref 83–108)
POTASSIUM BLDA-SCNC: 4 MMOL/L — SIGNIFICANT CHANGE UP (ref 3.4–4.5)
POTASSIUM BLDA-SCNC: 4 MMOL/L — SIGNIFICANT CHANGE UP (ref 3.4–4.5)
POTASSIUM BLDA-SCNC: 4.3 MMOL/L — SIGNIFICANT CHANGE UP (ref 3.4–4.5)
POTASSIUM SERPL-MCNC: 4.3 MMOL/L — SIGNIFICANT CHANGE UP (ref 3.5–5.3)
POTASSIUM SERPL-SCNC: 4.3 MMOL/L — SIGNIFICANT CHANGE UP (ref 3.5–5.3)
RBC # BLD: 2.45 M/UL — LOW (ref 3.8–5.2)
RBC # FLD: 14.8 % — HIGH (ref 10.3–14.5)
SAO2 % BLDA: 90.7 % — LOW (ref 95–99)
SAO2 % BLDA: 94.1 % — LOW (ref 95–99)
SAO2 % BLDA: 98 % — SIGNIFICANT CHANGE UP (ref 95–99)
SODIUM BLDA-SCNC: 134 MMOL/L — LOW (ref 136–146)
SODIUM BLDA-SCNC: 137 MMOL/L — SIGNIFICANT CHANGE UP (ref 136–146)
SODIUM BLDA-SCNC: 137 MMOL/L — SIGNIFICANT CHANGE UP (ref 136–146)
SODIUM SERPL-SCNC: 136 MMOL/L — SIGNIFICANT CHANGE UP (ref 135–145)
WBC # BLD: 10.1 K/UL — SIGNIFICANT CHANGE UP (ref 3.8–10.5)
WBC # FLD AUTO: 10.1 K/UL — SIGNIFICANT CHANGE UP (ref 3.8–10.5)

## 2018-12-31 PROCEDURE — 99232 SBSQ HOSP IP/OBS MODERATE 35: CPT

## 2018-12-31 PROCEDURE — 76604 US EXAM CHEST: CPT | Mod: 26

## 2018-12-31 PROCEDURE — 99291 CRITICAL CARE FIRST HOUR: CPT

## 2018-12-31 PROCEDURE — 71045 X-RAY EXAM CHEST 1 VIEW: CPT | Mod: 26

## 2018-12-31 RX ORDER — ACETAMINOPHEN 500 MG
650 TABLET ORAL ONCE
Qty: 0 | Refills: 0 | Status: DISCONTINUED | OUTPATIENT
Start: 2018-12-31 | End: 2018-12-31

## 2018-12-31 RX ORDER — PANTOPRAZOLE SODIUM 20 MG/1
40 TABLET, DELAYED RELEASE ORAL DAILY
Qty: 0 | Refills: 0 | Status: DISCONTINUED | OUTPATIENT
Start: 2018-12-31 | End: 2019-01-02

## 2018-12-31 RX ORDER — ONDANSETRON 8 MG/1
4 TABLET, FILM COATED ORAL EVERY 6 HOURS
Qty: 0 | Refills: 0 | Status: DISCONTINUED | OUTPATIENT
Start: 2018-12-31 | End: 2019-01-02

## 2018-12-31 RX ORDER — HYDROMORPHONE HYDROCHLORIDE 2 MG/ML
30 INJECTION INTRAMUSCULAR; INTRAVENOUS; SUBCUTANEOUS
Qty: 0 | Refills: 0 | Status: DISCONTINUED | OUTPATIENT
Start: 2018-12-31 | End: 2019-01-02

## 2018-12-31 RX ORDER — ACETAMINOPHEN 500 MG
1000 TABLET ORAL ONCE
Qty: 0 | Refills: 0 | Status: COMPLETED | OUTPATIENT
Start: 2018-12-31 | End: 2018-12-31

## 2018-12-31 RX ORDER — HYDROMORPHONE HYDROCHLORIDE 2 MG/ML
0.5 INJECTION INTRAMUSCULAR; INTRAVENOUS; SUBCUTANEOUS
Qty: 0 | Refills: 0 | Status: DISCONTINUED | OUTPATIENT
Start: 2018-12-31 | End: 2019-01-02

## 2018-12-31 RX ORDER — GABAPENTIN 400 MG/1
300 CAPSULE ORAL DAILY
Qty: 0 | Refills: 0 | Status: DISCONTINUED | OUTPATIENT
Start: 2018-12-31 | End: 2018-12-31

## 2018-12-31 RX ORDER — HYDROMORPHONE HYDROCHLORIDE 2 MG/ML
0.5 INJECTION INTRAMUSCULAR; INTRAVENOUS; SUBCUTANEOUS EVERY 4 HOURS
Qty: 0 | Refills: 0 | Status: DISCONTINUED | OUTPATIENT
Start: 2018-12-31 | End: 2018-12-31

## 2018-12-31 RX ORDER — GABAPENTIN 400 MG/1
300 CAPSULE ORAL EVERY 12 HOURS
Qty: 0 | Refills: 0 | Status: DISCONTINUED | OUTPATIENT
Start: 2018-12-31 | End: 2018-12-31

## 2018-12-31 RX ORDER — TRAMADOL HYDROCHLORIDE 50 MG/1
50 TABLET ORAL EVERY 8 HOURS
Qty: 0 | Refills: 0 | Status: DISCONTINUED | OUTPATIENT
Start: 2018-12-31 | End: 2018-12-31

## 2018-12-31 RX ORDER — GABAPENTIN 400 MG/1
1 CAPSULE ORAL
Qty: 90 | Refills: 0 | OUTPATIENT
Start: 2018-12-31 | End: 2019-01-29

## 2018-12-31 RX ORDER — NALOXONE HYDROCHLORIDE 4 MG/.1ML
0.1 SPRAY NASAL
Qty: 0 | Refills: 0 | Status: DISCONTINUED | OUTPATIENT
Start: 2018-12-31 | End: 2019-01-02

## 2018-12-31 RX ORDER — LEVOTHYROXINE SODIUM 125 MCG
68 TABLET ORAL AT BEDTIME
Qty: 0 | Refills: 0 | Status: DISCONTINUED | OUTPATIENT
Start: 2018-12-31 | End: 2019-01-01

## 2018-12-31 RX ORDER — GABAPENTIN 400 MG/1
300 CAPSULE ORAL THREE TIMES A DAY
Qty: 0 | Refills: 0 | Status: DISCONTINUED | OUTPATIENT
Start: 2018-12-31 | End: 2019-01-06

## 2018-12-31 RX ORDER — TRAMADOL HYDROCHLORIDE 50 MG/1
75 TABLET ORAL EVERY 8 HOURS
Qty: 0 | Refills: 0 | Status: DISCONTINUED | OUTPATIENT
Start: 2018-12-31 | End: 2019-01-02

## 2018-12-31 RX ORDER — HYDROCORTISONE 20 MG
50 TABLET ORAL EVERY 8 HOURS
Qty: 0 | Refills: 0 | Status: DISCONTINUED | OUTPATIENT
Start: 2018-12-31 | End: 2019-01-02

## 2018-12-31 RX ORDER — HYDROMORPHONE HYDROCHLORIDE 2 MG/ML
0.25 INJECTION INTRAMUSCULAR; INTRAVENOUS; SUBCUTANEOUS ONCE
Qty: 0 | Refills: 0 | Status: DISCONTINUED | OUTPATIENT
Start: 2018-12-31 | End: 2018-12-31

## 2018-12-31 RX ADMIN — TRAMADOL HYDROCHLORIDE 50 MILLIGRAM(S): 50 TABLET ORAL at 12:10

## 2018-12-31 RX ADMIN — PANTOPRAZOLE SODIUM 40 MILLIGRAM(S): 20 TABLET, DELAYED RELEASE ORAL at 12:46

## 2018-12-31 RX ADMIN — Medication 50 MILLIGRAM(S): at 15:55

## 2018-12-31 RX ADMIN — HYDROMORPHONE HYDROCHLORIDE 0.25 MILLIGRAM(S): 2 INJECTION INTRAMUSCULAR; INTRAVENOUS; SUBCUTANEOUS at 12:42

## 2018-12-31 RX ADMIN — HEPARIN SODIUM 5000 UNIT(S): 5000 INJECTION INTRAVENOUS; SUBCUTANEOUS at 15:54

## 2018-12-31 RX ADMIN — Medication 1000 MILLIGRAM(S): at 16:15

## 2018-12-31 RX ADMIN — TRAMADOL HYDROCHLORIDE 50 MILLIGRAM(S): 50 TABLET ORAL at 13:10

## 2018-12-31 RX ADMIN — HYDROMORPHONE HYDROCHLORIDE 0.25 MILLIGRAM(S): 2 INJECTION INTRAMUSCULAR; INTRAVENOUS; SUBCUTANEOUS at 13:07

## 2018-12-31 RX ADMIN — GABAPENTIN 300 MILLIGRAM(S): 400 CAPSULE ORAL at 12:46

## 2018-12-31 RX ADMIN — Medication 1000 MILLIGRAM(S): at 10:20

## 2018-12-31 RX ADMIN — HEPARIN SODIUM 5000 UNIT(S): 5000 INJECTION INTRAVENOUS; SUBCUTANEOUS at 21:54

## 2018-12-31 RX ADMIN — PANTOPRAZOLE SODIUM 40 MILLIGRAM(S): 20 TABLET, DELAYED RELEASE ORAL at 05:21

## 2018-12-31 RX ADMIN — BUDESONIDE AND FORMOTEROL FUMARATE DIHYDRATE 2 PUFF(S): 160; 4.5 AEROSOL RESPIRATORY (INHALATION) at 22:30

## 2018-12-31 RX ADMIN — Medication 400 MILLIGRAM(S): at 10:20

## 2018-12-31 RX ADMIN — HYDROMORPHONE HYDROCHLORIDE 0.5 MILLIGRAM(S): 2 INJECTION INTRAMUSCULAR; INTRAVENOUS; SUBCUTANEOUS at 11:12

## 2018-12-31 RX ADMIN — GABAPENTIN 300 MILLIGRAM(S): 400 CAPSULE ORAL at 21:55

## 2018-12-31 RX ADMIN — Medication 400 MILLIGRAM(S): at 16:15

## 2018-12-31 RX ADMIN — HYDROMORPHONE HYDROCHLORIDE 0.25 MILLIGRAM(S): 2 INJECTION INTRAMUSCULAR; INTRAVENOUS; SUBCUTANEOUS at 13:22

## 2018-12-31 RX ADMIN — Medication 8 MILLIGRAM(S): at 05:16

## 2018-12-31 RX ADMIN — BUDESONIDE AND FORMOTEROL FUMARATE DIHYDRATE 2 PUFF(S): 160; 4.5 AEROSOL RESPIRATORY (INHALATION) at 09:22

## 2018-12-31 RX ADMIN — Medication 68 MICROGRAM(S): at 21:54

## 2018-12-31 RX ADMIN — HYDROMORPHONE HYDROCHLORIDE 0.5 MILLIGRAM(S): 2 INJECTION INTRAMUSCULAR; INTRAVENOUS; SUBCUTANEOUS at 11:27

## 2018-12-31 RX ADMIN — Medication 50 MILLIGRAM(S): at 21:54

## 2018-12-31 RX ADMIN — HYDROMORPHONE HYDROCHLORIDE 30 MILLILITER(S): 2 INJECTION INTRAMUSCULAR; INTRAVENOUS; SUBCUTANEOUS at 19:45

## 2018-12-31 RX ADMIN — HEPARIN SODIUM 5000 UNIT(S): 5000 INJECTION INTRAVENOUS; SUBCUTANEOUS at 05:21

## 2018-12-31 RX ADMIN — HYDROMORPHONE HYDROCHLORIDE 0.25 MILLIGRAM(S): 2 INJECTION INTRAMUSCULAR; INTRAVENOUS; SUBCUTANEOUS at 12:27

## 2018-12-31 RX ADMIN — ALBUTEROL 1 PUFF(S): 90 AEROSOL, METERED ORAL at 11:10

## 2018-12-31 RX ADMIN — HYDROMORPHONE HYDROCHLORIDE 30 MILLILITER(S): 2 INJECTION INTRAMUSCULAR; INTRAVENOUS; SUBCUTANEOUS at 15:27

## 2018-12-31 RX ADMIN — INSULIN GLARGINE 5 UNIT(S): 100 INJECTION, SOLUTION SUBCUTANEOUS at 21:54

## 2018-12-31 NOTE — PROGRESS NOTE ADULT - PROBLEM SELECTOR PLAN 2
Currently on IV Levothyroxine 68 mcg daily.  Recommend resume prior IV levothyroxine dose of 103 mcg IV daily as a conversion rate from PO to IV levothyroxine of 75% is preferred over 50%.  Free T4 showed improvement on above recommended regimen.  When tolerating po can change to levothyroxine 137 mcg po daily.

## 2018-12-31 NOTE — PROGRESS NOTE ADULT - SUBJECTIVE AND OBJECTIVE BOX
Chief Complaint: "I have pain in my back, knees and now my stomach."    HPI:  63 year old woman with COPD on home O2 (3L), moderate pulmonary HTN, DM, RA on prednisone (10 mg daily x 30 years), HTN, s/p CVA (no residual deficit), s/p  x2 presents with abdominal pain for one day.  She developed mid abdominal cramping pain, sharp, last evening that has been constant and moderate to severe.  It was associated with multiple episodes of nonbloody, nonbilious emesis.  She had a normal bowel movement this morning and has not passed flatus today.    CT scan shows SBO with transition point in the lower abdomen with associated interloop fluid and mesenteric edema. (15 Dec 2018 23:33)  18: Procedure: Exploratory laparotomy, closure with external retention sutures for evisceration.        PAST MEDICAL & SURGICAL HISTORY:  Diverticulosis  CVA (Cerebral Vascular Accident)  RA (Rheumatoid Arthritis)  Tooth Abscess  Arthritis  Cigarette Smoker  Chronic Asthma  Adult Hypothyroidism  Borderline Diabetes Mellitus  High Cholesterol  H/O: HTN  Wrist Disorder: S/P Surgery  History of  Section      FAMILY HISTORY:  No pertinent family history in first degree relatives      SOCIAL HISTORY:  [ ] Denies Smoking, Alcohol, or Drug Use    Allergies    No Known Allergies    Intolerances        PAIN MEDICATIONS:  gabapentin 300 milliGRAM(s) Oral three times a day  HYDROmorphone PCA (1 mG/mL) 30 milliLiter(s) PCA Continuous PCA Continuous  HYDROmorphone PCA (1 mG/mL) Rescue Clinician Bolus 0.5 milliGRAM(s) IV Push every 15 minutes PRN  ondansetron Injectable 4 milliGRAM(s) IV Push every 6 hours PRN  traMADol 75 milliGRAM(s) Oral every 8 hours PRN    Heme:  heparin  Injectable 5000 Unit(s) SubCutaneous every 8 hours    Antibiotics:    Cardiovascular:  metoprolol tartrate Injectable 5 milliGRAM(s) IV Push every 6 hours    GI:  pantoprazole  Injectable 40 milliGRAM(s) IV Push daily    Endocrine:  dextrose 50% Injectable 25 milliLiter(s) IV Push once  dextrose 50% Injectable 12.5 Gram(s) IV Push once  glucagon  Injectable 1 milliGRAM(s) IntraMuscular once PRN  hydrocortisone sodium succinate Injectable 50 milliGRAM(s) IV Push every 8 hours  insulin glargine Injectable (LANTUS) 5 Unit(s) SubCutaneous at bedtime  insulin lispro (HumaLOG) corrective regimen sliding scale   SubCutaneous three times a day before meals  insulin lispro (HumaLOG) corrective regimen sliding scale   SubCutaneous at bedtime  levothyroxine Injectable 68 MICROGram(s) IV Push at bedtime    All Other Medications:  fluticasone propionate 50 MICROgram(s)/spray Nasal Spray 1 Spray(s) Both Nostrils two times a day  influenza   Vaccine 0.5 milliLiter(s) IntraMuscular once  lactated ringers. 1000 milliLiter(s) IV Continuous <Continuous>  naloxone Injectable 0.1 milliGRAM(s) IV Push every 3 minutes PRN  sodium chloride 0.65% Nasal 1 Spray(s) Both Nostrils every 6 hours PRN      REVIEW OF SYSTEMS:    CONSTITUTIONAL: No fever, weight loss, or fatigue  EYES: No eye pain, visual disturbances, or discharge  ENMT:  No difficulty hearing, tinnitus, vertigo; No sinus or throat pain  NECK: No pain or stiffness  BREASTS: No pain, masses, or nipple discharge  RESPIRATORY: No cough, wheezing, chills or hemoptysis; No shortness of breath  CARDIOVASCULAR: No chest pain, palpitations, dizziness, or leg swelling  GASTROINTESTINAL: Abdominal surgical site pain. No nausea, vomiting, or hematemesis; No diarrhea or constipation. No melena or hematochezia.  GENITOURINARY: No dysuria, frequency, hematuria, or incontinence  NEUROLOGICAL: No headaches, memory loss, loss of strength, numbness, or tremors  SKIN: No itching, burning, rashes, or lesions   LYMPH NODES: No enlarged glands  ENDOCRINE: No heat or cold intolerance; No hair loss  MUSCULOSKELETAL: Patient has back, and bilateral knee pain  PSYCHIATRIC: No depression, anxiety, mood swings, or difficulty sleeping  HEME/LYMPH: No easy bruising, or bleeding gums  ALLERY AND IMMUNOLOGIC: No hives or eczema      Vital Signs Last 24 Hrs  T(C): 39.6 (31 Dec 2018 11:00), Max: 39.8 (31 Dec 2018 10:00)  T(F): 103.2 (31 Dec 2018 11:00), Max: 103.6 (31 Dec 2018 10:00)  HR: 116 (31 Dec 2018 14:00) (85 - 124)  BP: 107/59 (31 Dec 2018 14:00) (77/56 - 136/84)  BP(mean): 70 (31 Dec 2018 14:00) (60 - 97)  RR: 24 (31 Dec 2018 14:00) (14 - 29)  SpO2: 92% (31 Dec 2018 14:00) (92% - 100%)    PAIN SCORE:   9/10      SCALE USED: (1-10 VNRS)               LABS:                          7.7    10.10 )-----------( 360      ( 31 Dec 2018 02:40 )             24.2     12-    136  |  103  |  8   ----------------------------<  112<H>  4.3   |  22  |  0.96    Ca    7.8<L>      31 Dec 2018 02:40  Phos  2.7     12-  Mg     1.6     12-31      PT/INR - ( 30 Dec 2018 17:32 )   PT: 12.1 SEC;   INR: 1.06          PTT - ( 30 Dec 2018 17:32 )  PTT:26.8 SEC      Impression/Plan: Requested by SICU team to help manage pain. Discussed patient with SICU team, IV Dilaudid PCA and continuous pulse ox ordered. As per team, patient will stay on po Tramadol and po Neurontin for her chronic back and knee pain. May call Pain Service if further assistance needed.

## 2018-12-31 NOTE — PROGRESS NOTE ADULT - ASSESSMENT
63y Female w hx COPD on 3L O2 at home, moderate pulm HTN, CHRIS, DM, RA on chronic prednisone, HTN, CKD, CVA, who presented this admission on 12/15 c/o epigastric pain, n/v w CT evidence suggesting SBO. Was admitted to surg and had ex-lap 12/16 w SHANNON but no transition point found; abdomen was closed w retention sutures. Pt was then kept briefly in SICU due to intentional delayed extubation given pulm comorbidities. Went to floor 12/18 where has been doing well since.   Today 12/30 pt was coughing and had sudden evisceration of bowels. Went to OR for another ex-lap w findings of intact bowel; abdomen was closed w 6 additional retention sutures.   SICU consulted as pt was kept extubated post-op due to concerns re pulm comorbidities. In the OR, pt was given 1500 cc crystalloid, had 300 cc uop; was given pushes of senia initially, and had temp 38.3 treated w tylenol (has known UTI). No pressor requirements at end of case.    #neuro  -currently on fentanyl gtt since OR  -lethargic but arousable  -> wean down sedation in prep for cpap trial and possible extubation   -> pain ctrl prn    #resp  -currently intubated on vent (A/C, w , RR 14, FiO2 40, PEEP 5)  -ABG pre-op w metabolic alkalosis  -hx COPD and pulm HTN, current smoker  -> recheck ABG  -> wean down settings as tolerated in prep for possible extubation  -> c/w Proventil, Symbicort, Spiriva    #cv  -off pressors post-op  -HR 90s, bp 140s/70s  -hx HTN  -also takes lasix at home, held recently per pulm recs given pt w/o current signs pulm edema  -> metoprolol 5 q6 as tolerated  -> monitor uop, lactate    #GI  -s/p ex-lap today for evisceration, retention sutures placed  -NPO while on vent  -protonix 40 BID    #renal  -hx CKD  -BUN/Crt wnl  -> monitor I's&O's  -> repletions PRN  -> LR @ 125cc/h    #heme  -H/H stable during admission prior to OR  -> recheck post-op  -> sqh for dvt ppx    #ID  -T 100.4 this am  -wbc 11-has been on ceftriaxone for uti since 12/26  -ucx pos for ecoli 12/20  -> will hold abx at this time pending signs sepsis     #endo  -hx RA on chronic prednisone, DM  -previously on taper as inpt, given stressdose hydrocortisone 100 in OR  -> c/w lantus 10, ISS  -> c/w methylpred    #dispo  -SICU 63y Female w hx COPD on 3L O2 at home, moderate pulm HTN (as shown on echo from 2017), CHRIS (Untreated), DM, RA on chronic prednisone, HTN, CKD, CVA, who presented this admission on 12/15 c/o epigastric pain, n/v w CT evidence suggesting SBO. Was admitted to surg and had ex-lap 12/16 w SHANNON but no transition point found; abdomen was closed w retention sutures. Pt was then kept briefly in SICU due to intentional delayed extubation given pulm comorbidities. Went to floor 12/18 where has been doing well since.   Today 12/30 pt was coughing and had sudden evisceration of bowels. Went to OR for another ex-lap w findings of intact bowel; abdomen was closed w 6 additional retention sutures.   SICU consulted as pt was kept extubated post-op due to concerns re pulm comorbidities. In the OR, pt was given 1500 cc crystalloid, had 300 cc uop; was given pushes of senia initially, and had temp 38.3 treated w tylenol (has known UTI). No pressor requirements at end of case.    #neuro  -currently on precedex  -lethargic but arousable  -> wean down sedation in prep for cpap trial and possible extubation   -> pain ctrl prn    #resp  -currently intubated on vent (A/C, w , RR 14, FiO2 40, PEEP 5)  -ABG pre-op w metabolic alkalosis  -hx COPD and pulm HTN, current smoker  -> recheck ABG  -> wean down settings as tolerated in prep for possible extubation  -> c/w Proventil, Symbicort, Spiriva    #cv  -off pressors post-op  -HR 90s, bp 140s/70s  -hx HTN  -also takes lasix at home, held recently per pulm recs given pt w/o current signs pulm edema  -> metoprolol 5 q6 as tolerated  -> monitor uop, lactate    #GI  -s/p ex-lap today for evisceration, retention sutures placed  -NPO while on vent  -> decrease protonix to qday from BID    #renal  -hx CKD  -BUN/Crt wnl  -> monitor I's&O's  -> repletions PRN  -> LR @ 75cc/h    #heme  -H/H stable during admission prior to OR  -> recheck post-op  -> sqh for dvt ppx    #ID  -T 100.4 this am  -wbc 11-has been on ceftriaxone for uti since 12/26  -ucx pos for ecoli 12/20  -> will hold abx at this time pending signs sepsis     #endo  -hx RA on chronic prednisone, DM  -previously on taper as inpt, given stressdose hydrocortisone 100 in OR  -> c/w lantus 10, ISS  -> d/c solu-medrol --> start solu-cortef q8hr    #dispo  -SICU 63y Female w hx COPD on 3L O2 at home, moderate pulm HTN (as shown on echo from 2017), CHRIS (Untreated), DM, RA on chronic prednisone, HTN, CKD, CVA, who presented this admission on 12/15 c/o epigastric pain, n/v w CT evidence suggesting SBO. Was admitted to surg and had ex-lap 12/16 w SHANNON but no transition point found; abdomen was closed w retention sutures. Pt was then kept briefly in SICU due to intentional delayed extubation given pulm comorbidities. Went to floor 12/18 where has been doing well since.   Today 12/30 pt was coughing and had sudden evisceration of bowels. Went to OR for another ex-lap w findings of intact bowel; abdomen was closed w 6 additional retention sutures.   SICU consulted as pt was kept extubated post-op due to concerns re pulm comorbidities. In the OR, pt was given 1500 cc crystalloid, had 300 cc uop; was given pushes of senia initially, and had temp 38.3 treated w tylenol (has known UTI). No pressor requirements at end of case.    #neuro  -currently on precedex  -lethargic but arousable  -> wean down sedation in prep for cpap trial and possible extubation   -> pain ctrl prn    #resp  -currently intubated on vent (A/C, w , RR 14, FiO2 40, PEEP 5)  -ABG pre-op w metabolic alkalosis  -hx COPD and pulm HTN, current smoker  -> recheck ABG  -> wean down settings as tolerated in prep for possible extubation  -> c/w Proventil, Symbicort, Spiriva    #cv  -off pressors post-op  -HR 90s, bp 140s/70s  -hx HTN  -also takes lasix at home, held recently per pulm recs given pt w/o current signs pulm edema  -> metoprolol 5 q6 as tolerated  -> monitor uop, lactate    #GI  -s/p ex-lap today for evisceration, retention sutures placed  -NPO while on vent  -> decrease protonix to qday from BID    #renal  -hx CKD  -BUN/Crt wnl  -> monitor I's&O's  -> repletions PRN  -> LR @ 75cc/h    #heme  -H/H stable during admission prior to OR  -> recheck post-op  -> sqh for dvt ppx    #ID  -T 100.4 this am  -wbc 11-has been on ceftriaxone for uti since 12/26  -ucx pos for ecoli 12/20  -> will hold abx at this time pending signs sepsis     #endo  -hx RA on chronic prednisone, DM  -previously on taper as inpt, given stressdose hydrocortisone 100 in OR  -> c/w lantus 10, ISS  -> d/c solu-medrol --> start solu-cortef q8hr    #dispo  -SICU    Critical Care Dx:  - Respiratory failure  - CHRIS  - Morbid obesity  - Evisceration  - Severe protein/calorie malnutrition  - Pulmonary HTN  - COPD  - Adrenal insufficiency

## 2018-12-31 NOTE — PROGRESS NOTE ADULT - PROBLEM SELECTOR PLAN 1
S/p returned to OR, NPO.  On LR, without any IV dextrose.  Given prolonged NPO status would add some dextrose to her maintenance fluids.  - For now continue Lantus 5 units qhs (can titrate up once on dextrose or if tolerating po).  -Continue low correction scales - if NPO would change to q6h    - d/c likely on prandin, sitagliptin and likely lower dose tresiba pending FSG trends  -Follow up with Dr. Saul as outpatient.

## 2018-12-31 NOTE — PROGRESS NOTE ADULT - SUBJECTIVE AND OBJECTIVE BOX
Chief Complaint: DM2/chronic steroids, hypothyroid    History: Patient s/p OR, remained on vent earlier today but was extubated.  She is complaining of excess secretions.    MEDICATIONS  (STANDING):  buDESOnide 160 MICROgram(s)/formoterol 4.5 MICROgram(s) Inhaler 2 Puff(s) Inhalation two times a day  dextrose 50% Injectable 25 milliLiter(s) IV Push once  dextrose 50% Injectable 12.5 Gram(s) IV Push once  fluticasone propionate 50 MICROgram(s)/spray Nasal Spray 1 Spray(s) Both Nostrils two times a day  gabapentin 300 milliGRAM(s) Oral three times a day  heparin  Injectable 5000 Unit(s) SubCutaneous every 8 hours  hydrocortisone sodium succinate Injectable 50 milliGRAM(s) IV Push every 8 hours  HYDROmorphone PCA (1 mG/mL) 30 milliLiter(s) PCA Continuous PCA Continuous  influenza   Vaccine 0.5 milliLiter(s) IntraMuscular once  insulin glargine Injectable (LANTUS) 5 Unit(s) SubCutaneous at bedtime  insulin lispro (HumaLOG) corrective regimen sliding scale   SubCutaneous three times a day before meals  insulin lispro (HumaLOG) corrective regimen sliding scale   SubCutaneous at bedtime  lactated ringers. 1000 milliLiter(s) (75 mL/Hr) IV Continuous <Continuous>  levothyroxine Injectable 68 MICROGram(s) IV Push at bedtime  metoprolol tartrate Injectable 5 milliGRAM(s) IV Push every 6 hours  pantoprazole  Injectable 40 milliGRAM(s) IV Push daily    MEDICATIONS  (PRN):  ALBUTerol    90 MICROgram(s) HFA Inhaler 1 Puff(s) Inhalation every 4 hours PRN Shortness of Breath and/or Wheezing  glucagon  Injectable 1 milliGRAM(s) IntraMuscular once PRN Glucose LESS THAN 70 milligrams/deciliter  HYDROmorphone PCA (1 mG/mL) Rescue Clinician Bolus 0.5 milliGRAM(s) IV Push every 15 minutes PRN for Pain Scale GREATER THAN 6  naloxone Injectable 0.1 milliGRAM(s) IV Push every 3 minutes PRN For ANY of the following changes in patient status:  A. RR LESS THAN 10 breaths per minute, B. Oxygen saturation LESS THAN 90%, C. Sedation score of 6  ondansetron Injectable 4 milliGRAM(s) IV Push every 6 hours PRN Nausea  sodium chloride 0.65% Nasal 1 Spray(s) Both Nostrils every 6 hours PRN Nasal Congestion  traMADol 75 milliGRAM(s) Oral every 8 hours PRN Moderate Pain (4 - 6)      Allergies    No Known Allergies    Intolerances      Review of Systems    ALL OTHER SYSTEMS REVIEWED AND NEGATIVE      PHYSICAL EXAM:  VITALS: T(C): 39.6 (12-31-18 @ 11:00)  T(F): 103.2 (12-31-18 @ 11:00), Max: 103.6 (12-31-18 @ 10:00)  HR: 116 (12-31-18 @ 14:00) (85 - 126)  BP: 107/59 (12-31-18 @ 14:00) (77/56 - 161/88)  RR:  (14 - 29)  SpO2:  (92% - 100%)  Wt(kg): --  GENERAL: NAD, cushingoid appearance  EYES: No proptosis, no lid lag, anicteric  HEENT:  Atraumatic, Normocephalic, oxygen face tent  NEURO: extraocular movements intact, no tremor  PSYCH: Alert and oriented x 3, normal affect, normal mood      CAPILLARY BLOOD GLUCOSE      POCT Blood Glucose.: 105 mg/dL (31 Dec 2018 12:45)  POCT Blood Glucose.: 102 mg/dL (31 Dec 2018 05:14)  POCT Blood Glucose.: 125 mg/dL (30 Dec 2018 23:36)  POCT Blood Glucose.: 181 mg/dL (30 Dec 2018 16:11)      12-31    136  |  103  |  8   ----------------------------<  112<H>  4.3   |  22  |  0.96    EGFR if : 74  EGFR if non : 64    Ca    7.8<L>      12-31  Mg     1.6     12-31  Phos  2.7     12-31            Thyroid Function Tests:  12-27 @ 07:15 TSH -- FreeT4 1.18 T3 -- Anti TPO -- Anti Thyroglobulin Ab -- TSI --  12-20 @ 06:45 TSH 40.15 FreeT4 0.98 T3 -- Anti TPO -- Anti Thyroglobulin Ab -- TSI --      Hemoglobin A1C, Whole Blood: 7.1 % <H> [4.0 - 5.6] (12-17-18 @ 03:50)  Hemoglobin A1C, Whole Blood: 7.3 % <H> [4.0 - 5.6] (12-16-18 @ 04:17)

## 2018-12-31 NOTE — PROGRESS NOTE ADULT - SUBJECTIVE AND OBJECTIVE BOX
Surgery Progress Note    S: Patient seen and examined. No acute events overnight.  Remains intubated and on vent. Not on pressors. Awaiting GI fxn.     O:  Vital Signs Last 24 Hrs  T(C): 38.3 (31 Dec 2018 04:00), Max: 38.7 (31 Dec 2018 00:00)  T(F): 100.9 (31 Dec 2018 04:00), Max: 101.6 (31 Dec 2018 00:00)  HR: 105 (31 Dec 2018 08:00) (85 - 126)  BP: 130/82 (31 Dec 2018 07:00) (77/56 - 173/89)  BP(mean): 90 (31 Dec 2018 07:00) (60 - 111)  RR: 25 (31 Dec 2018 08:00) (14 - 25)  SpO2: 99% (31 Dec 2018 08:00) (79% - 100%)    I&O's Detail    30 Dec 2018 07:01  -  31 Dec 2018 07:00  --------------------------------------------------------  IN:    dexmedetomidine Infusion: 77.1 mL    fentaNYL  Infusion: 178.1 mL    IV PiggyBack: 200 mL    lactated ringers.: 1895 mL  Total IN: 2350.2 mL    OUT:    Indwelling Catheter - Urethral: 1080 mL  Total OUT: 1080 mL    Total NET: 1270.2 mL      31 Dec 2018 07:01  -  31 Dec 2018 08:46  --------------------------------------------------------  IN:  Total IN: 0 mL    OUT:    Indwelling Catheter - Urethral: 205 mL  Total OUT: 205 mL    Total NET: -205 mL          MEDICATIONS  (STANDING):  buDESOnide 160 MICROgram(s)/formoterol 4.5 MICROgram(s) Inhaler 2 Puff(s) Inhalation two times a day  dexmedetomidine Infusion 0.2 MICROgram(s)/kG/Hr (5.73 mL/Hr) IV Continuous <Continuous>  dextrose 50% Injectable 25 milliLiter(s) IV Push once  dextrose 50% Injectable 12.5 Gram(s) IV Push once  fluticasone propionate 50 MICROgram(s)/spray Nasal Spray 1 Spray(s) Both Nostrils two times a day  heparin  Injectable 5000 Unit(s) SubCutaneous every 8 hours  hydrocortisone sodium succinate Injectable 50 milliGRAM(s) IV Push every 8 hours  influenza   Vaccine 0.5 milliLiter(s) IntraMuscular once  insulin glargine Injectable (LANTUS) 5 Unit(s) SubCutaneous at bedtime  insulin lispro (HumaLOG) corrective regimen sliding scale   SubCutaneous three times a day before meals  insulin lispro (HumaLOG) corrective regimen sliding scale   SubCutaneous at bedtime  lactated ringers. 1000 milliLiter(s) (75 mL/Hr) IV Continuous <Continuous>  levothyroxine Injectable 68 MICROGram(s) IV Push at bedtime  metoprolol tartrate Injectable 5 milliGRAM(s) IV Push every 6 hours  pantoprazole  Injectable 40 milliGRAM(s) IV Push daily  tiotropium 18 MICROgram(s) Capsule 1 Capsule(s) Inhalation daily    MEDICATIONS  (PRN):  ALBUTerol    90 MICROgram(s) HFA Inhaler 1 Puff(s) Inhalation every 4 hours PRN Shortness of Breath and/or Wheezing  glucagon  Injectable 1 milliGRAM(s) IntraMuscular once PRN Glucose LESS THAN 70 milligrams/deciliter  sodium chloride 0.65% Nasal 1 Spray(s) Both Nostrils every 6 hours PRN Nasal Congestion                            7.7    10.10 )-----------( 360      ( 31 Dec 2018 02:40 )             24.2       12-31    136  |  103  |  8   ----------------------------<  112<H>  4.3   |  22  |  0.96    Ca    7.8<L>      31 Dec 2018 02:40  Phos  2.7     12-31  Mg     1.6     12-31        Physical Exam:  Gen: Laying in bed, NAD  Resp: Unlabored breathing  Abd: soft, NT, ND, incision c/d/i with retention sutures in place   Ext: WWP  Skin: No rashes

## 2018-12-31 NOTE — CHART NOTE - NSCHARTNOTEFT_GEN_A_CORE
Patient initially intubated, point of care ultrasound performed to assess volume status and pulmonary edema. Ultrasound b/l lungs revealed a-lines throughout no b- lines visualized. Parasternal long and short axis showed adequate ejection fraction, no wall motion abnormality, LV and RV size appropriate. IVC appeared collapsed in subxiphoid view. Apical 4 view was difficult to obtain given body habitus.  Clinical impression is patient is Euvolemic with no pulmonary edema. Patient initially intubated, point of care ultrasound performed to assess volume status and pulmonary edema. Ultrasound b/l lungs revealed a-lines throughout no b- lines visualized. Parasternal long and short axis showed adequate ejection fraction, no wall motion abnormality, LV and RV size appropriate. IVC appeared collapsed in subxiphoid view. Apical 4 view was difficult to obtain given body habitus.  Clinical impression is patient is Euvolemic with no pulmonary edema.  Agree. RB

## 2018-12-31 NOTE — PROGRESS NOTE ADULT - ASSESSMENT
63F w/ multiple respiratory comorbidities p/w SBO s/p ex-lap with findings of healthy bowel c/b evisceration of bowel.    - DVT ppx  - wean vent as tolerated   - monitor GI fxn   - care per SICU team

## 2018-12-31 NOTE — PROGRESS NOTE ADULT - SUBJECTIVE AND OBJECTIVE BOX
HISTORY OF PRESENT ILLNESS:    AFTAB BASS is a 63y Female w hx COPD on 3L O2 at home, moderate pulm HTN, CHRIS, DM, RA on chronic prednisone, HTN, CKD, CVA, who presented this admission on 12/15 c/o epigastric pain, n/v w CT evidence suggesting SBO. Was admitted to surg and had ex-lap 12/16 w SHANNON but no transition point found; abdomen was closed w retention sutures. Pt was then kept briefly in SICU due to intentional delayed extubation given pulm comorbidities. Went to floor 12/18 where has been doing well since.   Today 12/30 pt was coughing and had sudden evisceration of bowels. Went to OR for another ex-lap w findings of intact, healthy bowel; was closed w 6 additional retention sutures. SICU consulted as pt was again intentionally kept extubated post-op due to concerns re pulm comorbidities. In the OR, pt was given 1500 cc crystalloid, had 300 cc uop; was given pushes of senia initially, and had temp 38.3 treated w tylenol (has known UTI). No pressor requirements at end of case.  24 HOUR EVENTS:  Pt seen this morning by bedside. Pt remains intubated on AC control. Remains sedated on precedex and fentanyl, and is not agitated. Vitals remain stable. Will trial CPAP later on today.       SUBJECTIVE/ROS:  [ ] A ten-point review of systems was otherwise negative except as noted.  [ ] Due to altered mental status/intubation, subjective information were not able to be obtained from the patient. History was obtained, to the extent possible, from review of the chart and collateral sources of information.      NEURO  RASS:     GCS:     [facility] ICU:  Exam: awake, alert, oriented  Meds: dexmedetomidine Infusion 0.2 MICROgram(s)/kG/Hr IV Continuous <Continuous>  fentaNYL   Infusion 0.5 MICROgram(s)/kG/Hr IV Continuous <Continuous>    [x] Adequacy of sedation and pain control has been assessed and adjusted      RESPIRATORY  RR: 20 (12-30-18 @ 23:00) (14 - 20)  SpO2: 98% (12-30-18 @ 23:00) (79% - 100%)  Wt(kg): --  Exam: unlabored, clear to auscultation bilaterally  Mechanical Ventilation: Mode: AC/ CMV (Assist Control/ Continuous Mandatory Ventilation), RR (machine): 14, RR (patient): 14, TV (machine): 500, FiO2: 40, PEEP: 5, ITime: 0.8, MAP: 10, PIP: 24  ABG - ( 30 Dec 2018 10:23 )  pH: 7.51  /  pCO2: 33    /  pO2: 239   / HCO3: 28    / Base Excess: 3.3   /  SaO2: 98.5    Lactate: x                [N/A] Extubation Readiness Assessed  Meds: ALBUTerol    90 MICROgram(s) HFA Inhaler 1 Puff(s) Inhalation every 4 hours PRN Shortness of Breath and/or Wheezing  buDESOnide 160 MICROgram(s)/formoterol 4.5 MICROgram(s) Inhaler 2 Puff(s) Inhalation two times a day  tiotropium 18 MICROgram(s) Capsule 1 Capsule(s) Inhalation daily        CARDIOVASCULAR  HR: 102 (12-30-18 @ 23:00) (83 - 126)  BP: 124/74 (12-30-18 @ 23:00) (87/61 - 173/89)  BP(mean): 85 (12-30-18 @ 23:00) (63 - 111)  ABP: --  ABP(mean): --  Wt(kg): --  CVP(cm H2O): --  VBG - ( 30 Dec 2018 17:50 )  pH: 7.36  /  pCO2: 42    /  pO2: 57    / HCO3: 23    / Base Excess: -1.2  /  SaO2: 86.9   Lactate: x                  Exam: regular rate and rhythm  Cardiac Rhythm: sinus  Perfusion     [x]Adequate   [ ]Inadequate  Mentation   [x]Normal       [ ]Reduced  Extremities  [x]Warm         [ ]Cool  Volume Status [ ]Hypervolemic [x]Euvolemic [ ]Hypovolemic  Meds: metoprolol tartrate Injectable 5 milliGRAM(s) IV Push every 6 hours        GI/NUTRITION  Exam: soft, nontender, nondistended, incision C/D/I  Diet:  Meds: pantoprazole  Injectable 40 milliGRAM(s) IV Push two times a day      GENITOURINARY  I&O's Detail    12-29 @ 07:01  -  12-30 @ 07:00  --------------------------------------------------------  IN:    dextrose 5% + sodium chloride 0.45% with potassium chloride 20 mEq/L: 600 mL    Oral Fluid: 150 mL  Total IN: 750 mL    OUT:    Voided: 2150 mL  Total OUT: 2150 mL    Total NET: -1400 mL      12-30 @ 07:01  -  12-31 @ 00:38  --------------------------------------------------------  IN:    dexmedetomidine Infusion: 48.6 mL    fentaNYL  Infusion: 132.1 mL    IV PiggyBack: 100 mL    lactated ringers.: 1220 mL  Total IN: 1500.7 mL    OUT:    Indwelling Catheter - Urethral: 745 mL  Total OUT: 745 mL    Total NET: 755.7 mL        Weight (kg): 114.6 (12-30 @ 07:58)  12-30    137  |  101  |  8   ----------------------------<  183<H>  4.7   |  19<L>  |  0.89    Ca    8.4      30 Dec 2018 17:32  Phos  3.4     12-30  Mg     1.7     12-30      [ ] Antoine catheter, indication: N/A  Meds: lactated ringers. 1000 milliLiter(s) IV Continuous <Continuous>        HEMATOLOGIC  Meds: heparin  Injectable 5000 Unit(s) SubCutaneous every 8 hours    [x] VTE Prophylaxis                        8.0    9.92  )-----------( 399      ( 30 Dec 2018 17:32 )             26.4     PT/INR - ( 30 Dec 2018 17:32 )   PT: 12.1 SEC;   INR: 1.06          PTT - ( 30 Dec 2018 17:32 )  PTT:26.8 SEC  Transfusion     [ ] PRBC   [ ] Platelets   [ ] FFP   [ ] Cryoprecipitate      INFECTIOUS DISEASES  WBC Count: 9.92 K/uL (12-30 @ 17:32)  WBC Count: 11.01 K/uL (12-30 @ 07:50)    RECENT CULTURES:    Meds: influenza   Vaccine 0.5 milliLiter(s) IntraMuscular once        ENDOCRINE  CAPILLARY BLOOD GLUCOSE      POCT Blood Glucose.: 125 mg/dL (30 Dec 2018 23:36)  POCT Blood Glucose.: 181 mg/dL (30 Dec 2018 16:11)  POCT Blood Glucose.: 135 mg/dL (30 Dec 2018 07:41)    Meds: dextrose 50% Injectable 25 milliLiter(s) IV Push once  dextrose 50% Injectable 12.5 Gram(s) IV Push once  glucagon  Injectable 1 milliGRAM(s) IntraMuscular once PRN  insulin glargine Injectable (LANTUS) 5 Unit(s) SubCutaneous at bedtime  insulin lispro (HumaLOG) corrective regimen sliding scale   SubCutaneous three times a day before meals  insulin lispro (HumaLOG) corrective regimen sliding scale   SubCutaneous at bedtime  levothyroxine Injectable 103 MICROGram(s) IV Push at bedtime  methylPREDNISolone sodium succinate Injectable 8 milliGRAM(s) IV Push every 12 hours        ACCESS DEVICES:  [ ] Peripheral IV  [ ] Central Venous Line	[ ] R	[ ] L	[ ] IJ	[ ] Fem	[ ] SC	Placed:   [ ] Arterial Line		[ ] R	[ ] L	[ ] Fem	[ ] Rad	[ ] Ax	Placed:   [ ] PICC:					[ ] Mediport  [ ] Urinary Catheter, Date Placed:   [x] Necessity of urinary, arterial, and venous catheters discussed    OTHER MEDICATIONS:  fluticasone propionate 50 MICROgram(s)/spray Nasal Spray 1 Spray(s) Both Nostrils two times a day  sodium chloride 0.65% Nasal 1 Spray(s) Both Nostrils every 6 hours PRN      CODE STATUS:      IMAGING: HISTORY OF PRESENT ILLNESS:    AFTAB BASS is a 63y Female w hx COPD 2/2 smoking on 3L O2 at home, moderate pulm HTN, CHRIS, DM, RA on chronic prednisone, HTN, CKD, CVA, who presented this admission on 12/15 c/o epigastric pain, n/v w CT evidence suggesting SBO. Was admitted to surgery and had ex-lap 12/16 w SHANNON but no transition point found; abdomen was closed w retention sutures. Pt was then kept briefly in SICU due to intentionally delayed extubation given pulm comorbidities. Went to floor 12/18 where has been doing well since.   Yesterday 12/30 pt was coughing and had sudden evisceration of bowels. Went to OR for another ex-lap w findings of intact, healthy bowel; was closed w 6 additional retention sutures. SICU consulted as pt was again intentionally kept extubated post-op due to concerns re pulm comorbidities. In the OR, pt was given 1500 cc crystalloid, had 300 cc uop; was given pushes of senia initially, and had temp 38.3 treated w tylenol (has known UTI). No pressor requirements at end of case.    24 HOUR EVENTS:  Pt seen this morning by bedside. Pt remains intubated on AC control. Remains sedated on precedex and fentanyl, and is not agitated. Vitals remain stable. Will trial CPAP later on today.       SUBJECTIVE/ROS:  [ ] A ten-point review of systems was otherwise negative except as noted.  [ ] Due to altered mental status/intubation, subjective information were not able to be obtained from the patient. History was obtained, to the extent possible, from review of the chart and collateral sources of information.      NEURO  Exam: intubated, sedated, responds to commands  Meds: dexmedetomidine Infusion 0.2 MICROgram(s)/kG/Hr IV Continuous <Continuous>  fentaNYL   Infusion 0.5 MICROgram(s)/kG/Hr IV Continuous <Continuous>    [x] Adequacy of sedation and pain control has been assessed and adjusted      RESPIRATORY  RR: 20 (12-30-18 @ 23:00) (14 - 20)  SpO2: 98% (12-30-18 @ 23:00) (79% - 100%)  Wt(kg): --  Exam: intubated, equal chest rise bilaterally  Mechanical Ventilation: Mode: AC/ CMV (Assist Control/ Continuous Mandatory Ventilation), RR (machine): 14, RR (patient): 14, TV (machine): 500, FiO2: 40, PEEP: 5, ITime: 0.8, MAP: 10, PIP: 24  ABG - ( 30 Dec 2018 10:23 )  pH: 7.51  /  pCO2: 33    /  pO2: 239   / HCO3: 28    / Base Excess: 3.3   /  SaO2: 98.5    Lactate: x                [N/A] Extubation Readiness Assessed  Meds: ALBUTerol    90 MICROgram(s) HFA Inhaler 1 Puff(s) Inhalation every 4 hours PRN Shortness of Breath and/or Wheezing  buDESOnide 160 MICROgram(s)/formoterol 4.5 MICROgram(s) Inhaler 2 Puff(s) Inhalation two times a day  tiotropium 18 MICROgram(s) Capsule 1 Capsule(s) Inhalation daily        CARDIOVASCULAR  HR: 102 (12-30-18 @ 23:00) (83 - 126)  BP: 124/74 (12-30-18 @ 23:00) (87/61 - 173/89)  BP(mean): 85 (12-30-18 @ 23:00) (63 - 111)  VBG - ( 30 Dec 2018 17:50 )  pH: 7.36  /  pCO2: 42    /  pO2: 57    / HCO3: 23    / Base Excess: -1.2  /  SaO2: 86.9   Lactate: x        Exam: regular rate and rhythm  Cardiac Rhythm: sinus  Perfusion     [x]Adequate   [ ]Inadequate  Mentation   [x]Normal       [ ]Reduced  Extremities  [x]Warm         [ ]Cool  Volume Status [ ]Hypervolemic [x]Euvolemic [ ]Hypovolemic  Meds: metoprolol tartrate Injectable 5 milliGRAM(s) IV Push every 6 hours        GI/NUTRITION  Exam: soft, mildly distended, incision C/D/I with retention sutures  Diet: NPO  Meds: pantoprazole  Injectable 40 milliGRAM(s) IV Push two times a day      GENITOURINARY  I&O's Detail    12-29 @ 07:01  -  12-30 @ 07:00  --------------------------------------------------------  IN:    dextrose 5% + sodium chloride 0.45% with potassium chloride 20 mEq/L: 600 mL    Oral Fluid: 150 mL  Total IN: 750 mL    OUT:    Voided: 2150 mL  Total OUT: 2150 mL    Total NET: -1400 mL      12-30 @ 07:01  -  12-31 @ 00:38  --------------------------------------------------------  IN:    dexmedetomidine Infusion: 48.6 mL    fentaNYL  Infusion: 132.1 mL    IV PiggyBack: 100 mL    lactated ringers.: 1220 mL  Total IN: 1500.7 mL    OUT:    Indwelling Catheter - Urethral: 745 mL  Total OUT: 745 mL    Total NET: 755.7 mL        Weight (kg): 114.6 (12-30 @ 07:58)  12-30    137  |  101  |  8   ----------------------------<  183<H>  4.7   |  19<L>  |  0.89    Ca    8.4      30 Dec 2018 17:32  Phos  3.4     12-30  Mg     1.7     12-30      [ ] Antoine catheter, indication: N/A  Meds: lactated ringers. 1000 milliLiter(s) IV Continuous <Continuous>        HEMATOLOGIC  Meds: heparin  Injectable 5000 Unit(s) SubCutaneous every 8 hours    [x] VTE Prophylaxis                        8.0    9.92  )-----------( 399      ( 30 Dec 2018 17:32 )             26.4     PT/INR - ( 30 Dec 2018 17:32 )   PT: 12.1 SEC;   INR: 1.06          PTT - ( 30 Dec 2018 17:32 )  PTT:26.8 SEC  Transfusion     [ ] PRBC   [ ] Platelets   [ ] FFP   [ ] Cryoprecipitate      INFECTIOUS DISEASES  WBC Count: 9.92 K/uL (12-30 @ 17:32)  WBC Count: 11.01 K/uL (12-30 @ 07:50)    RECENT CULTURES:    Meds: influenza   Vaccine 0.5 milliLiter(s) IntraMuscular once        ENDOCRINE  CAPILLARY BLOOD GLUCOSE      POCT Blood Glucose.: 125 mg/dL (30 Dec 2018 23:36)  POCT Blood Glucose.: 181 mg/dL (30 Dec 2018 16:11)  POCT Blood Glucose.: 135 mg/dL (30 Dec 2018 07:41)    Meds: dextrose 50% Injectable 25 milliLiter(s) IV Push once  dextrose 50% Injectable 12.5 Gram(s) IV Push once  glucagon  Injectable 1 milliGRAM(s) IntraMuscular once PRN  insulin glargine Injectable (LANTUS) 5 Unit(s) SubCutaneous at bedtime  insulin lispro (HumaLOG) corrective regimen sliding scale   SubCutaneous three times a day before meals  insulin lispro (HumaLOG) corrective regimen sliding scale   SubCutaneous at bedtime  levothyroxine Injectable 103 MICROGram(s) IV Push at bedtime  methylPREDNISolone sodium succinate Injectable 8 milliGRAM(s) IV Push every 12 hours        ACCESS DEVICES:  [ ] Peripheral IV  [ ] Central Venous Line	[ ] R	[ ] L	[ ] IJ	[ ] Fem	[ ] SC	Placed:   [ ] Arterial Line		[ ] R	[ ] L	[ ] Fem	[ ] Rad	[ ] Ax	Placed:   [ ] PICC:					[ ] Mediport  [ ] Urinary Catheter, Date Placed:   [x] Necessity of urinary, arterial, and venous catheters discussed    OTHER MEDICATIONS:  fluticasone propionate 50 MICROgram(s)/spray Nasal Spray 1 Spray(s) Both Nostrils two times a day  sodium chloride 0.65% Nasal 1 Spray(s) Both Nostrils every 6 hours PRN      CODE STATUS:      IMAGING:

## 2018-12-31 NOTE — PROGRESS NOTE ADULT - ATTENDING COMMENTS
63y Female w hx COPD on 3L O2 at home, moderate pulm HTN (as shown on echo from 2017), CHRIS (Untreated), DM, RA on chronic prednisone, HTN, CKD, CVA, who presented this admission on 12/15 c/o epigastric pain, n/v w CT evidence suggesting SBO. Was admitted to surg and had ex-lap 12/16 w SHANNON but no transition point found; abdomen was closed w retention sutures. Pt was then kept briefly in SICU due to intentional delayed extubation given pulm comorbidities. Went to floor 12/18 where has been doing well since.   Today 12/30 pt was coughing and had sudden evisceration of bowels. Went to OR for another ex-lap w findings of intact bowel; abdomen was closed w 6 additional retention sutures.   SICU consulted as pt was kept extubated post-op due to concerns re pulm comorbidities. In the OR, pt was given 1500 cc crystalloid, had 300 cc uop; was given pushes of senia initially, and had temp 38.3 treated w tylenol (has known UTI). No pressor requirements at end of case.    #neuro  -currently on precedex  -lethargic but arousable  -> wean down sedation in prep for cpap trial and possible extubation   -> pain ctrl prn    #resp  -currently intubated on vent (A/C, w , RR 14, FiO2 40, PEEP 5)  -ABG pre-op w metabolic alkalosis  -hx COPD and pulm HTN, current smoker  -> recheck ABG  -> wean down settings as tolerated in prep for possible extubation  -> c/w Proventil, Symbicort, Spiriva    #cv  -off pressors post-op  -HR 90s, bp 140s/70s  -hx HTN  -also takes lasix at home, held recently per pulm recs given pt w/o current signs pulm edema  -> metoprolol 5 q6 as tolerated  -> monitor uop, lactate    #GI  -s/p ex-lap today for evisceration, retention sutures placed  -NPO while on vent  -> decrease protonix to qday from BID    #renal  -hx CKD  -BUN/Crt wnl  -> monitor I's&O's  -> repletions PRN  -> LR @ 75cc/h    #heme  -H/H stable during admission prior to OR  -> recheck post-op  -> sqh for dvt ppx    #ID  -T 100.4 this am  -wbc 11-has been on ceftriaxone for uti since 12/26  -ucx pos for ecoli 12/20  -> will hold abx at this time pending signs sepsis     #endo  -hx RA on chronic prednisone, DM  -previously on taper as inpt, given stressdose hydrocortisone 100 in OR  -> c/w lantus 10, ISS  -> d/c solu-medrol --> start solu-cortef q8hr    #dispo  -SICU    Critical Care Dx:  - Respiratory failure  - CHRIS  - Morbid obesity  - Evisceration  - Severe protein/calorie malnutrition  - Pulmonary HTN  - COPD  - Adrenal insufficiency  The patient is a critical care patient with life threatening hemodynamic and metabolic instability in SICU.  I have personally interviewed when possible and examined the patient, reviewed data and laboratory tests/x-rays and all pertinent electronic images.  I was physically present for the key portions of the evaluation and management (E/M) service provided.   The SICU team has a constant risk benefit analyzes discussion with the primary team, all consultants, House Staff and PA's on all decisions.  These diagnoses are unrelated to the surgical procedure noted above.  I meet with family if needed to get further history, discuss the case and make care decisions for this patient who might not be able to participate.  Time involved in performance of separately billable procedures was not counted toward my critical care time. There is no overlap.  I spent 55-75 minutes of critical care time for the diagnoses, assessment, plan and interventions.

## 2019-01-01 LAB
ALBUMIN SERPL ELPH-MCNC: 2.7 G/DL — LOW (ref 3.3–5)
ALP SERPL-CCNC: 68 U/L — SIGNIFICANT CHANGE UP (ref 40–120)
ALT FLD-CCNC: 10 U/L — SIGNIFICANT CHANGE UP (ref 4–33)
APPEARANCE UR: SIGNIFICANT CHANGE UP
AST SERPL-CCNC: 24 U/L — SIGNIFICANT CHANGE UP (ref 4–32)
BACTERIA # UR AUTO: SIGNIFICANT CHANGE UP
BILIRUB DIRECT SERPL-MCNC: 0.1 MG/DL — SIGNIFICANT CHANGE UP (ref 0.1–0.2)
BILIRUB SERPL-MCNC: < 0.2 MG/DL — LOW (ref 0.2–1.2)
BILIRUB UR-MCNC: SIGNIFICANT CHANGE UP
BLOOD UR QL VISUAL: SIGNIFICANT CHANGE UP
BUN SERPL-MCNC: 10 MG/DL — SIGNIFICANT CHANGE UP (ref 7–23)
CALCIUM SERPL-MCNC: 8.5 MG/DL — SIGNIFICANT CHANGE UP (ref 8.4–10.5)
CHLORIDE SERPL-SCNC: 105 MMOL/L — SIGNIFICANT CHANGE UP (ref 98–107)
CO2 SERPL-SCNC: 23 MMOL/L — SIGNIFICANT CHANGE UP (ref 22–31)
COLOR SPEC: YELLOW — SIGNIFICANT CHANGE UP
CREAT SERPL-MCNC: 0.95 MG/DL — SIGNIFICANT CHANGE UP (ref 0.5–1.3)
CRP SERPL-MCNC: 416.5 MG/L — HIGH
EPI CELLS # UR: SIGNIFICANT CHANGE UP
ERYTHROCYTE [SEDIMENTATION RATE] IN BLOOD: 88 MM/HR — HIGH (ref 4–25)
GLUCOSE BLDC GLUCOMTR-MCNC: 122 MG/DL — HIGH (ref 70–99)
GLUCOSE BLDC GLUCOMTR-MCNC: 166 MG/DL — HIGH (ref 70–99)
GLUCOSE BLDC GLUCOMTR-MCNC: 206 MG/DL — HIGH (ref 70–99)
GLUCOSE BLDC GLUCOMTR-MCNC: 231 MG/DL — HIGH (ref 70–99)
GLUCOSE SERPL-MCNC: 190 MG/DL — HIGH (ref 70–99)
GLUCOSE UR-MCNC: NEGATIVE — SIGNIFICANT CHANGE UP
HCT VFR BLD CALC: 23.8 % — LOW (ref 34.5–45)
HGB BLD-MCNC: 7.3 G/DL — LOW (ref 11.5–15.5)
KETONES UR-MCNC: 15 — SIGNIFICANT CHANGE UP
LEUKOCYTE ESTERASE UR-ACNC: NEGATIVE — SIGNIFICANT CHANGE UP
MAGNESIUM SERPL-MCNC: 1.7 MG/DL — SIGNIFICANT CHANGE UP (ref 1.6–2.6)
MCHC RBC-ENTMCNC: 30.5 PG — SIGNIFICANT CHANGE UP (ref 27–34)
MCHC RBC-ENTMCNC: 30.7 % — LOW (ref 32–36)
MCV RBC AUTO: 99.6 FL — SIGNIFICANT CHANGE UP (ref 80–100)
NITRITE UR-MCNC: NEGATIVE — SIGNIFICANT CHANGE UP
NRBC # FLD: 0 — SIGNIFICANT CHANGE UP
PH UR: 5 — SIGNIFICANT CHANGE UP (ref 5–8)
PHOSPHATE SERPL-MCNC: 4.2 MG/DL — SIGNIFICANT CHANGE UP (ref 2.5–4.5)
PLATELET # BLD AUTO: 393 K/UL — SIGNIFICANT CHANGE UP (ref 150–400)
PMV BLD: 8.9 FL — SIGNIFICANT CHANGE UP (ref 7–13)
POTASSIUM SERPL-MCNC: 4.9 MMOL/L — SIGNIFICANT CHANGE UP (ref 3.5–5.3)
POTASSIUM SERPL-SCNC: 4.9 MMOL/L — SIGNIFICANT CHANGE UP (ref 3.5–5.3)
PROT SERPL-MCNC: 5.6 G/DL — LOW (ref 6–8.3)
PROT UR-MCNC: 100 — HIGH
RBC # BLD: 2.39 M/UL — LOW (ref 3.8–5.2)
RBC # FLD: 15.3 % — HIGH (ref 10.3–14.5)
RBC CASTS # UR COMP ASSIST: SIGNIFICANT CHANGE UP (ref 0–?)
SODIUM SERPL-SCNC: 142 MMOL/L — SIGNIFICANT CHANGE UP (ref 135–145)
SP GR SPEC: 1.02 — SIGNIFICANT CHANGE UP (ref 1–1.04)
SPECIMEN SOURCE: SIGNIFICANT CHANGE UP
UROBILINOGEN FLD QL: NORMAL — SIGNIFICANT CHANGE UP
WBC # BLD: 21.19 K/UL — HIGH (ref 3.8–10.5)
WBC # FLD AUTO: 21.19 K/UL — HIGH (ref 3.8–10.5)
WBC UR QL: SIGNIFICANT CHANGE UP (ref 0–?)

## 2019-01-01 PROCEDURE — 99233 SBSQ HOSP IP/OBS HIGH 50: CPT

## 2019-01-01 PROCEDURE — 71045 X-RAY EXAM CHEST 1 VIEW: CPT | Mod: 26

## 2019-01-01 RX ORDER — SODIUM CHLORIDE 9 MG/ML
1000 INJECTION, SOLUTION INTRAVENOUS
Qty: 0 | Refills: 0 | Status: DISCONTINUED | OUTPATIENT
Start: 2019-01-01 | End: 2019-01-02

## 2019-01-01 RX ORDER — TIOTROPIUM BROMIDE 18 UG/1
1 CAPSULE ORAL; RESPIRATORY (INHALATION) DAILY
Qty: 0 | Refills: 0 | Status: DISCONTINUED | OUTPATIENT
Start: 2019-01-01 | End: 2019-01-04

## 2019-01-01 RX ORDER — FOLIC ACID/VIT B COMPLEX AND C 400 MCG
10000 TABLET ORAL DAILY
Qty: 0 | Refills: 0 | Status: COMPLETED | OUTPATIENT
Start: 2019-01-01 | End: 2019-01-03

## 2019-01-01 RX ORDER — LEVOTHYROXINE SODIUM 125 MCG
103 TABLET ORAL AT BEDTIME
Qty: 0 | Refills: 0 | Status: DISCONTINUED | OUTPATIENT
Start: 2019-01-01 | End: 2019-01-02

## 2019-01-01 RX ORDER — VANCOMYCIN HCL 1 G
1000 VIAL (EA) INTRAVENOUS ONCE
Qty: 0 | Refills: 0 | Status: COMPLETED | OUTPATIENT
Start: 2019-01-01 | End: 2019-01-01

## 2019-01-01 RX ORDER — PIPERACILLIN AND TAZOBACTAM 4; .5 G/20ML; G/20ML
3.38 INJECTION, POWDER, LYOPHILIZED, FOR SOLUTION INTRAVENOUS EVERY 8 HOURS
Qty: 0 | Refills: 0 | Status: DISCONTINUED | OUTPATIENT
Start: 2019-01-01 | End: 2019-01-10

## 2019-01-01 RX ORDER — PIPERACILLIN AND TAZOBACTAM 4; .5 G/20ML; G/20ML
3.38 INJECTION, POWDER, LYOPHILIZED, FOR SOLUTION INTRAVENOUS ONCE
Qty: 0 | Refills: 0 | Status: DISCONTINUED | OUTPATIENT
Start: 2019-01-01 | End: 2019-01-01

## 2019-01-01 RX ORDER — CALCIUM CARBONATE 500(1250)
1 TABLET ORAL EVERY 4 HOURS
Qty: 0 | Refills: 0 | Status: DISCONTINUED | OUTPATIENT
Start: 2019-01-01 | End: 2019-01-06

## 2019-01-01 RX ORDER — MAGNESIUM SULFATE 500 MG/ML
2 VIAL (ML) INJECTION ONCE
Qty: 0 | Refills: 0 | Status: COMPLETED | OUTPATIENT
Start: 2019-01-01 | End: 2019-01-01

## 2019-01-01 RX ORDER — BENZOCAINE AND MENTHOL 5; 1 G/100ML; G/100ML
1 LIQUID ORAL EVERY 4 HOURS
Qty: 0 | Refills: 0 | Status: DISCONTINUED | OUTPATIENT
Start: 2019-01-01 | End: 2019-01-06

## 2019-01-01 RX ADMIN — Medication 1: at 06:19

## 2019-01-01 RX ADMIN — HYDROMORPHONE HYDROCHLORIDE 30 MILLILITER(S): 2 INJECTION INTRAMUSCULAR; INTRAVENOUS; SUBCUTANEOUS at 07:21

## 2019-01-01 RX ADMIN — PIPERACILLIN AND TAZOBACTAM 25 GRAM(S): 4; .5 INJECTION, POWDER, LYOPHILIZED, FOR SOLUTION INTRAVENOUS at 22:21

## 2019-01-01 RX ADMIN — Medication 50 MILLIGRAM(S): at 06:19

## 2019-01-01 RX ADMIN — BUDESONIDE AND FORMOTEROL FUMARATE DIHYDRATE 2 PUFF(S): 160; 4.5 AEROSOL RESPIRATORY (INHALATION) at 22:28

## 2019-01-01 RX ADMIN — Medication 50 MILLIGRAM(S): at 22:21

## 2019-01-01 RX ADMIN — HEPARIN SODIUM 5000 UNIT(S): 5000 INJECTION INTRAVENOUS; SUBCUTANEOUS at 12:39

## 2019-01-01 RX ADMIN — BENZOCAINE AND MENTHOL 1 LOZENGE: 5; 1 LIQUID ORAL at 19:14

## 2019-01-01 RX ADMIN — PANTOPRAZOLE SODIUM 40 MILLIGRAM(S): 20 TABLET, DELAYED RELEASE ORAL at 12:39

## 2019-01-01 RX ADMIN — Medication 2: at 12:55

## 2019-01-01 RX ADMIN — Medication 103 MICROGRAM(S): at 22:37

## 2019-01-01 RX ADMIN — INSULIN GLARGINE 5 UNIT(S): 100 INJECTION, SOLUTION SUBCUTANEOUS at 22:20

## 2019-01-01 RX ADMIN — Medication 1 SPRAY(S): at 06:19

## 2019-01-01 RX ADMIN — HYDROMORPHONE HYDROCHLORIDE 30 MILLILITER(S): 2 INJECTION INTRAMUSCULAR; INTRAVENOUS; SUBCUTANEOUS at 19:17

## 2019-01-01 RX ADMIN — GABAPENTIN 300 MILLIGRAM(S): 400 CAPSULE ORAL at 12:38

## 2019-01-01 RX ADMIN — BENZOCAINE AND MENTHOL 1 LOZENGE: 5; 1 LIQUID ORAL at 07:22

## 2019-01-01 RX ADMIN — Medication 50 GRAM(S): at 06:20

## 2019-01-01 RX ADMIN — Medication 5 MILLIGRAM(S): at 23:32

## 2019-01-01 RX ADMIN — PIPERACILLIN AND TAZOBACTAM 25 GRAM(S): 4; .5 INJECTION, POWDER, LYOPHILIZED, FOR SOLUTION INTRAVENOUS at 12:37

## 2019-01-01 RX ADMIN — Medication 250 MILLIGRAM(S): at 12:37

## 2019-01-01 RX ADMIN — Medication 50 MILLIGRAM(S): at 12:38

## 2019-01-01 RX ADMIN — HEPARIN SODIUM 5000 UNIT(S): 5000 INJECTION INTRAVENOUS; SUBCUTANEOUS at 06:19

## 2019-01-01 RX ADMIN — GABAPENTIN 300 MILLIGRAM(S): 400 CAPSULE ORAL at 06:20

## 2019-01-01 RX ADMIN — HEPARIN SODIUM 5000 UNIT(S): 5000 INJECTION INTRAVENOUS; SUBCUTANEOUS at 22:22

## 2019-01-01 RX ADMIN — Medication 2: at 18:46

## 2019-01-01 RX ADMIN — BUDESONIDE AND FORMOTEROL FUMARATE DIHYDRATE 2 PUFF(S): 160; 4.5 AEROSOL RESPIRATORY (INHALATION) at 10:30

## 2019-01-01 RX ADMIN — GABAPENTIN 300 MILLIGRAM(S): 400 CAPSULE ORAL at 22:21

## 2019-01-01 RX ADMIN — TIOTROPIUM BROMIDE 1 CAPSULE(S): 18 CAPSULE ORAL; RESPIRATORY (INHALATION) at 10:30

## 2019-01-01 NOTE — PROGRESS NOTE ADULT - ASSESSMENT
63y Female w hx COPD on 3L O2 at home, moderate pulm HTN (as shown on echo from 2017), CHRIS (Untreated), DM, RA on chronic prednisone, HTN, CKD, CVA, who presented this admission on 12/15 c/o epigastric pain, n/v w CT evidence suggesting SBO. Was admitted to surg and had ex-lap 12/16 w SHANNON but no transition point found; abdomen was closed w retention sutures. Pt was then kept briefly in SICU due to intentional delayed extubation given pulm comorbidities. Went to floor 12/18 where has been doing well since.   Today 12/30 pt was coughing and had sudden evisceration of bowels. Went to OR for another ex-lap w findings of intact bowel; abdomen was closed w 6 additional retention sutures.   SICU consulted as pt was kept extubated post-op due to concerns re pulm comorbidities. In the OR, pt was given 1500 cc crystalloid, had 300 cc uop; was given pushes of senia initially, and had temp 38.3 treated w tylenol (has known UTI). No pressor requirements at end of case.    #neuro  - pain ctrl prn with PCA     #resp  - hx COPD and pulm HTN, current smoker  - c/w Proventil, Symbicort, Spiriva    #cv  - off pressors post-op  - HR 90s, bp 140s/70s  - hx HTN  - also takes lasix at home, held recently per pulm recs given pt w/o current signs pulm edema  - metoprolol 5 q6 as tolerated  - monitor uop, lactate    #GI  - s/p ex-lap POD #2 for evisceration, retention sutures placed  - await GI function  - c/w protonix daily    #renal  - hx CKD  - BUN/Crt wnl  - monitor I's&O's  - repletions PRN  - LR @ 75cc/h    #heme  -H/H stable during admission prior to OR  - sqh for dvt ppx    #ID  - monitor temps  - f/u cultures   - will hold abx at this time pending signs sepsis     #endo  - hx RA on chronic prednisone, DM  - previously on taper as inpt, given stressdose hydrocortisone 100 in OR  - c/w steriod dosing\  - check ESR, CRP  - c/w lantus 10, ISS    #dispo  -SICU      Critical Care Dx:  - Respiratory failure  - CHRIS  - Morbid obesity  - Evisceration  - Severe protein/calorie malnutrition  - Pulmonary HTN  - COPD  - Adrenal insufficiency 63y Female w hx COPD on 3L O2 at home, moderate pulm HTN (as shown on echo from 2017), CHRIS (Untreated), DM, RA on chronic prednisone, HTN, CKD, CVA, who presented this admission on 12/15 c/o epigastric pain, n/v w CT evidence suggesting SBO. Was admitted to surg and had ex-lap 12/16 w SHANNON but no transition point found; abdomen was closed w retention sutures. Pt was then kept briefly in SICU due to intentional delayed extubation given pulm comorbidities. Went to floor 12/18 where has been doing well since.   Today 12/30 pt was coughing and had sudden evisceration of bowels. Went to OR for another ex-lap w findings of intact bowel; abdomen was closed w 6 additional retention sutures.   SICU consulted as pt was kept extubated post-op due to concerns re pulm comorbidities. In the OR, pt was given 1500 cc crystalloid, had 300 cc uop; was given pushes of senia initially, and had temp 38.3 treated w tylenol (has known UTI). No pressor requirements at end of case.    #neuro  - pain ctrl prn with PCA     #resp  - hx COPD and pulm HTN, current smoker  - c/w Proventil, Symbicort, Spiriva    #cv  - off pressors post-op  - HR 90s, bp 140s/70s  - hx HTN  - also takes lasix at home, held recently per pulm recs given pt w/o current signs pulm edema  - metoprolol 5 q6 as tolerated  - monitor uop, lactate    #GI  - s/p ex-lap POD #2 for evisceration, retention sutures placed  - await GI function  - c/w protonix daily    #renal  - hx CKD  - BUN/Crt wnl  - monitor I's&O's  - repletions PRN  - D5 1/2NS @ 75cc/h    #heme  - H/H stable during admission prior to OR  - sqh for dvt ppx    #ID  - trend fevers  - f/u UA  - Started vanc/zosyn    #endo  - hx RA on chronic prednisone, DM  - continue solu-cortef  - start vitamin A to assist wound healing  - c/w lantus 10, ISS  - continue synthroid, endo following    #dispo  -SICU      Critical Care Dx:  - Respiratory failure  - CHRIS  - Morbid obesity  - Evisceration  - Severe protein/calorie malnutrition  - Pulmonary HTN  - COPD  - Adrenal insufficiency

## 2019-01-01 NOTE — PROGRESS NOTE ADULT - SUBJECTIVE AND OBJECTIVE BOX
Surgery Progress Note    S: Patient seen and examined. Febrile to 38.1 overnight.  Extubated yesterday w/o complication.  Tolerating face vent mask.      Vital Signs Last 24 Hrs  T(C): 37.2 (01 Jan 2019 12:00), Max: 38.1 (31 Dec 2018 20:00)  T(F): 99 (01 Jan 2019 12:00), Max: 100.5 (31 Dec 2018 20:00)  HR: 105 (01 Jan 2019 15:25) (85 - 155)  BP: 115/59 (01 Jan 2019 13:00) (79/56 - 128/74)  BP(mean): 71 (01 Jan 2019 13:00) (53 - 84)  RR: 20 (01 Jan 2019 13:00) (15 - 30)  SpO2: 96% (01 Jan 2019 15:25) (93% - 100%)    I&O's Detail    31 Dec 2018 07:01  -  01 Jan 2019 07:00  --------------------------------------------------------  IN:    dexmedetomidine Infusion: 5.7 mL    IV PiggyBack: 300 mL    lactated ringers.: 1800 mL  Total IN: 2105.7 mL    OUT:    Indwelling Catheter - Urethral: 1600 mL  Total OUT: 1600 mL    Total NET: 505.7 mL      01 Jan 2019 07:01  -  01 Jan 2019 15:31  --------------------------------------------------------  IN:    dextrose 5% + sodium chloride 0.45%.: 525 mL  Total IN: 525 mL    OUT:    Indwelling Catheter - Urethral: 215 mL  Total OUT: 215 mL    Total NET: 310 mL              MEDICATIONS  (STANDING):  buDESOnide 160 MICROgram(s)/formoterol 4.5 MICROgram(s) Inhaler 2 Puff(s) Inhalation two times a day  dexmedetomidine Infusion 0.2 MICROgram(s)/kG/Hr (5.73 mL/Hr) IV Continuous <Continuous>  dextrose 50% Injectable 25 milliLiter(s) IV Push once  dextrose 50% Injectable 12.5 Gram(s) IV Push once  fluticasone propionate 50 MICROgram(s)/spray Nasal Spray 1 Spray(s) Both Nostrils two times a day  heparin  Injectable 5000 Unit(s) SubCutaneous every 8 hours  hydrocortisone sodium succinate Injectable 50 milliGRAM(s) IV Push every 8 hours  influenza   Vaccine 0.5 milliLiter(s) IntraMuscular once  insulin glargine Injectable (LANTUS) 5 Unit(s) SubCutaneous at bedtime  insulin lispro (HumaLOG) corrective regimen sliding scale   SubCutaneous three times a day before meals  insulin lispro (HumaLOG) corrective regimen sliding scale   SubCutaneous at bedtime  lactated ringers. 1000 milliLiter(s) (75 mL/Hr) IV Continuous <Continuous>  levothyroxine Injectable 68 MICROGram(s) IV Push at bedtime  metoprolol tartrate Injectable 5 milliGRAM(s) IV Push every 6 hours  pantoprazole  Injectable 40 milliGRAM(s) IV Push daily  tiotropium 18 MICROgram(s) Capsule 1 Capsule(s) Inhalation daily    MEDICATIONS  (PRN):  ALBUTerol    90 MICROgram(s) HFA Inhaler 1 Puff(s) Inhalation every 4 hours PRN Shortness of Breath and/or Wheezing  glucagon  Injectable 1 milliGRAM(s) IntraMuscular once PRN Glucose LESS THAN 70 milligrams/deciliter  sodium chloride 0.65% Nasal 1 Spray(s) Both Nostrils every 6 hours PRN Nasal Congestion            Physical Exam:  Gen: Laying in bed, NAD  Resp: Unlabored breathing  Abd: soft, NT, ND, incision c/d/i with retention sutures in place   Ext: WWP  Skin: No rashes                          7.3    21.19 )-----------( 393      ( 01 Jan 2019 02:22 )             23.8     01-01    142  |  105  |  10  ----------------------------<  190<H>  4.9   |  23  |  0.95    Ca    8.5      01 Jan 2019 02:20  Phos  4.2     01-01  Mg     1.7     01-01    TPro  5.6<L>  /  Alb  2.7<L>  /  TBili  < 0.2<L>  /  DBili  0.1  /  AST  24  /  ALT  10  /  AlkPhos  68  01-01

## 2019-01-01 NOTE — PROGRESS NOTE ADULT - SUBJECTIVE AND OBJECTIVE BOX
POST ANESTHESIA EVALUATION    63y Female POSTOP DAY 1 S/P     MENTAL STATUS: Patient participation [ X ] Awake     [  ] Arousable     [  ] Sedated    AIRWAY PATENCY: [X  ] Satisfactory  [  ] Other:     Vital Signs Last 24 Hrs  T(C): 37.1 (01 Jan 2019 04:00), Max: 39.6 (31 Dec 2018 11:00)  T(F): 98.8 (01 Jan 2019 04:00), Max: 103.2 (31 Dec 2018 11:00)  HR: 97 (01 Jan 2019 10:30) (85 - 129)  BP: 92/51 (01 Jan 2019 08:00) (79/56 - 128/74)  BP(mean): 62 (01 Jan 2019 08:00) (57 - 88)  RR: 15 (01 Jan 2019 08:00) (15 - 30)  SpO2: 98% (01 Jan 2019 10:30) (92% - 100%)  I&O's Summary    31 Dec 2018 07:01  -  01 Jan 2019 07:00  --------------------------------------------------------  IN: 2105.7 mL / OUT: 1600 mL / NET: 505.7 mL          NAUSEA/ VOMITTING:  [ X ] NONE  [  ] CONTROLLED [  ] OTHER     PAIN: [ X ] CONTROLLED WITH CURRENT REGIMEN  [  ] OTHER    [ X ] NO APPARENT ANESTHESIA COMPLICATIONS      Comments:

## 2019-01-01 NOTE — PROGRESS NOTE ADULT - SUBJECTIVE AND OBJECTIVE BOX
Anesthesia Pain Management Service    SUBJECTIVE: Patient is doing well with IV PCA and no significant problems reported.    Pain Scale Score	At rest: ___ 	With Activity: ___ 	[X ] Refer to charted pain scores    THERAPY:    [ ] IV PCA Morphine		[ ] 5 mg/mL	[ ] 1 mg/mL  [X ] IV PCA Hydromorphone	[ ] 5 mg/mL	[X ] 1 mg/mL  [ ] IV PCA Fentanyl		[ ] 50 micrograms/mL    Demand dose __0.2_ lockout __6_ (minutes) Continuous Rate _0__ Total: ___  mg used (in past 24 hours)      MEDICATIONS  (STANDING):  buDESOnide 160 MICROgram(s)/formoterol 4.5 MICROgram(s) Inhaler 2 Puff(s) Inhalation two times a day  dextrose 5% + sodium chloride 0.45%. 1000 milliLiter(s) (75 mL/Hr) IV Continuous <Continuous>  dextrose 50% Injectable 25 milliLiter(s) IV Push once  dextrose 50% Injectable 12.5 Gram(s) IV Push once  fluticasone propionate 50 MICROgram(s)/spray Nasal Spray 1 Spray(s) Both Nostrils two times a day  gabapentin 300 milliGRAM(s) Oral three times a day  heparin  Injectable 5000 Unit(s) SubCutaneous every 8 hours  hydrocortisone sodium succinate Injectable 50 milliGRAM(s) IV Push every 8 hours  HYDROmorphone PCA (1 mG/mL) 30 milliLiter(s) PCA Continuous PCA Continuous  influenza   Vaccine 0.5 milliLiter(s) IntraMuscular once  insulin glargine Injectable (LANTUS) 5 Unit(s) SubCutaneous at bedtime  insulin lispro (HumaLOG) corrective regimen sliding scale   SubCutaneous three times a day before meals  insulin lispro (HumaLOG) corrective regimen sliding scale   SubCutaneous at bedtime  levothyroxine Injectable 103 MICROGram(s) IV Push at bedtime  metoprolol tartrate Injectable 5 milliGRAM(s) IV Push every 6 hours  pantoprazole  Injectable 40 milliGRAM(s) IV Push daily  piperacillin/tazobactam IVPB. 3.375 Gram(s) IV Intermittent every 8 hours  tiotropium 18 MICROgram(s) Capsule 1 Capsule(s) Inhalation daily  vancomycin  IVPB 1000 milliGRAM(s) IV Intermittent once  vitamin A 65397 Unit(s) Oral daily    MEDICATIONS  (PRN):  ALBUTerol    90 MICROgram(s) HFA Inhaler 1 Puff(s) Inhalation every 4 hours PRN Shortness of Breath and/or Wheezing  benzocaine 15 mG/menthol 3.6 mG (Sugar-Free) Lozenge 1 Lozenge Oral every 4 hours PRN Sore Throat  calcium carbonate    500 mG (Tums) Chewable 1 Tablet(s) Chew every 4 hours PRN Heartburn  glucagon  Injectable 1 milliGRAM(s) IntraMuscular once PRN Glucose LESS THAN 70 milligrams/deciliter  HYDROmorphone PCA (1 mG/mL) Rescue Clinician Bolus 0.5 milliGRAM(s) IV Push every 15 minutes PRN for Pain Scale GREATER THAN 6  naloxone Injectable 0.1 milliGRAM(s) IV Push every 3 minutes PRN For ANY of the following changes in patient status:  A. RR LESS THAN 10 breaths per minute, B. Oxygen saturation LESS THAN 90%, C. Sedation score of 6  ondansetron Injectable 4 milliGRAM(s) IV Push every 6 hours PRN Nausea  sodium chloride 0.65% Nasal 1 Spray(s) Both Nostrils every 6 hours PRN Nasal Congestion  traMADol 75 milliGRAM(s) Oral every 8 hours PRN Moderate Pain (4 - 6)      OBJECTIVE:    Sedation Score:	[ X] Alert	[ ] Drowsy 	[ ] Arousable	[ ] Asleep	[ ] Unresponsive    Side Effects:	[X ] None	[ ] Nausea	[ ] Vomiting	[ ] Pruritus  		[ ] Other:    Vital Signs Last 24 Hrs  T(C): 37.1 (01 Jan 2019 04:00), Max: 39.6 (31 Dec 2018 11:00)  T(F): 98.8 (01 Jan 2019 04:00), Max: 103.2 (31 Dec 2018 11:00)  HR: 97 (01 Jan 2019 10:30) (85 - 129)  BP: 92/51 (01 Jan 2019 08:00) (79/56 - 128/74)  BP(mean): 62 (01 Jan 2019 08:00) (57 - 88)  RR: 15 (01 Jan 2019 08:00) (15 - 30)  SpO2: 98% (01 Jan 2019 10:30) (92% - 100%)    ASSESSMENT/ PLAN    Therapy to  be:	[ X] Continue   [ ] Discontinued   [ ] Change to prn Analgesics    Documentation and Verification of current medications:   [X] Done	[ ] Not done, not elligible    Comments: Started clears. Pt wants to cont with IV PCA and requests tramadol to be added tomorrow.    Progress Note written now but Patient was seen earlier.

## 2019-01-01 NOTE — PROGRESS NOTE ADULT - SUBJECTIVE AND OBJECTIVE BOX
POST ANESTHESIA EVALUATION    63y Female POSTOP DAY 1 S/P exlap    MENTAL STATUS: Patient participation [X  ] Awake     [  ] Arousable     [  ] Sedated    AIRWAY PATENCY: [ X ] Satisfactory  [  ] Other:     Vital Signs Last 24 Hrs  T(C): 37.9 (01 Jan 2019 16:00), Max: 38.1 (31 Dec 2018 20:00)  T(F): 100.3 (01 Jan 2019 16:00), Max: 100.5 (31 Dec 2018 20:00)  HR: 105 (01 Jan 2019 17:00) (85 - 155)  BP: 113/67 (01 Jan 2019 17:00) (79/56 - 128/74)  BP(mean): 76 (01 Jan 2019 17:00) (47 - 98)  RR: 19 (01 Jan 2019 17:00) (15 - 25)  SpO2: 98% (01 Jan 2019 17:00) (93% - 100%)  I&O's Summary    31 Dec 2018 07:01  -  01 Jan 2019 07:00  --------------------------------------------------------  IN: 2105.7 mL / OUT: 1600 mL / NET: 505.7 mL    01 Jan 2019 07:01  -  01 Jan 2019 17:58  --------------------------------------------------------  IN: 825 mL / OUT: 440 mL / NET: 385 mL          NAUSEA/ VOMITTING:  [ X ] NONE  [  ] CONTROLLED [  ] OTHER     PAIN: [ X ] CONTROLLED WITH CURRENT REGIMEN  [  ] OTHER    [X  ] NO APPARENT ANESTHESIA COMPLICATIONS      Comments:

## 2019-01-01 NOTE — PROGRESS NOTE ADULT - ASSESSMENT
63F w/ multiple respiratory comorbidities p/w SBO s/p ex-lap with findings of healthy bowel c/b evisceration of bowel s/p ex-lap with placement of retention sutures.    - DVT ppx  - Cont abx for pna   - F/U blood cx  - monitor GI fxn   - care per SICU team

## 2019-01-01 NOTE — PROGRESS NOTE ADULT - ATTENDING COMMENTS
63y Female w hx COPD on 3L O2 at home, moderate pulm HTN (as shown on echo from 2017), CHRIS (Untreated), DM, RA on chronic prednisone, HTN, CKD, CVA, who presented this admission on 12/15 c/o epigastric pain, n/v w CT evidence suggesting SBO. Was admitted to surg and had ex-lap 12/16 w SHANNON but no transition point found; abdomen was closed w retention sutures. Pt was then kept briefly in SICU due to intentional delayed extubation given pulm comorbidities. Went to floor 12/18 where has been doing well since.   Today 12/30 pt was coughing and had sudden evisceration of bowels. Went to OR for another ex-lap w findings of intact bowel; abdomen was closed w 6 additional retention sutures.   SICU consulted as pt was kept extubated post-op due to concerns re pulm comorbidities. In the OR, pt was given 1500 cc crystalloid, had 300 cc uop; was given pushes of senia initially, and had temp 38.3 treated w tylenol (has known UTI). No pressor requirements at end of case.    #neuro  - pain ctrl prn with PCA     #resp  - hx COPD and pulm HTN, current smoker  - c/w Proventil, Symbicort, Spiriva    #cv  - off pressors post-op  - HR 90s, bp 140s/70s  - hx HTN  - also takes lasix at home, held recently per pulm recs given pt w/o current signs pulm edema  - metoprolol 5 q6 as tolerated  - monitor uop, lactate    #GI  - s/p ex-lap POD #2 for evisceration, retention sutures placed  - await GI function  - c/w protonix daily    #renal  - hx CKD  - BUN/Crt wnl  - monitor I's&O's  - repletions PRN  - D5 1/2NS @ 75cc/h    #heme  - H/H stable during admission prior to OR  - sqh for dvt ppx    #ID  - trend fevers  - f/u UA  - Started vanc/zosyn    #endo  - hx RA on chronic prednisone, DM  - continue solu-cortef  - start vitamin A to assist wound healing  - c/w lantus 10, ISS  - continue synthroid, endo following    #dispo  -SICU      Critical Care Dx:  - Respiratory failure  - CHRIS  - Morbid obesity  - Evisceration  - Severe protein/calorie malnutrition  - Pulmonary HTN  - COPD  - Adrenal insufficiency    The patient is a critical care patient with life threatening hemodynamic and metabolic instability in SICU.  I have personally interviewed when possible and examined the patient, reviewed data and laboratory tests/x-rays and all pertinent electronic images.  I was physically present for the key portions of the evaluation and management (E/M) service provided.   The SICU team has a constant risk benefit analyzes discussion with the primary team, all consultants, House Staff and PA's on all decisions.  These diagnoses are unrelated to the surgical procedure noted above.  I meet with family if needed to get further history, discuss the case and make care decisions for this patient who might not be able to participate.  Time involved in performance of separately billable procedures was not counted toward my critical care time. There is no overlap.  I spent 55-75 minutes of critical care time for the diagnoses, assessment, plan and interventions.

## 2019-01-01 NOTE — PROGRESS NOTE ADULT - SUBJECTIVE AND OBJECTIVE BOX
SICU Daily Progress Note  =====================================================  Interval/Overnight Events:   Extubated  without complication. OOB to chair.  Spiking temps persistently. Cultures sent.       POD #  16/        	SICU Day #   3      HPI:  AFTAB BASS is a 63y Female w hx COPD 2/2 smoking on 3L O2 at home, moderate pulm HTN, CHRIS, DM, RA on chronic prednisone, HTN, CKD, CVA, who presented this admission on 12/15 c/o epigastric pain, n/v w CT evidence suggesting SBO. Was admitted to surgery and had ex-lap  w SHANNON but no transition point found; abdomen was closed w retention sutures. Pt was then kept briefly in SICU due to intentionally delayed extubation given pulm comorbidities. Went to floor  where has been doing well since.   Yesterday  pt was coughing and had sudden evisceration of bowels. Went to OR for another ex-lap w findings of intact, healthy bowel; was closed w 6 additional retention sutures. SICU consulted as pt was again intentionally kept extubated post-op due to concerns re pulm comorbidities. In the OR, pt was given 1500 cc crystalloid, had 300 cc uop; was given pushes of senia initially, and had temp 38.3 treated w tylenol (has known UTI). No pressor requirements at end of case.       Past Medical History:  Adult Hypothyroidism    Arthritis    Borderline Diabetes Mellitus    Chronic Asthma    Cigarette Smoker    CVA (Cerebral Vascular Accident)    Diverticulosis    H/O: HTN    High Cholesterol    RA (Rheumatoid Arthritis)    Tooth Abscess.     Past Surgical History:  History of  Section    Wrist Disorder  S/P Surgery.    Allergies: No Known Allergies      MEDICATIONS:   --------------------------------------------------------------------------------------  Neurologic Medications  gabapentin 300 milliGRAM(s) Oral three times a day  HYDROmorphone PCA (1 mG/mL) 30 milliLiter(s) PCA Continuous PCA Continuous  HYDROmorphone PCA (1 mG/mL) Rescue Clinician Bolus 0.5 milliGRAM(s) IV Push every 15 minutes PRN for Pain Scale GREATER THAN 6  ondansetron Injectable 4 milliGRAM(s) IV Push every 6 hours PRN Nausea  traMADol 75 milliGRAM(s) Oral every 8 hours PRN Moderate Pain (4 - 6)    Respiratory Medications  ALBUTerol    90 MICROgram(s) HFA Inhaler 1 Puff(s) Inhalation every 4 hours PRN Shortness of Breath and/or Wheezing  buDESOnide 160 MICROgram(s)/formoterol 4.5 MICROgram(s) Inhaler 2 Puff(s) Inhalation two times a day    Cardiovascular Medications  metoprolol tartrate Injectable 5 milliGRAM(s) IV Push every 6 hours    Gastrointestinal Medications  lactated ringers. 1000 milliLiter(s) IV Continuous <Continuous>  pantoprazole  Injectable 40 milliGRAM(s) IV Push daily    Genitourinary Medications    Hematologic/Oncologic Medications  heparin  Injectable 5000 Unit(s) SubCutaneous every 8 hours  influenza   Vaccine 0.5 milliLiter(s) IntraMuscular once    Antimicrobial/Immunologic Medications    Endocrine/Metabolic Medications  dextrose 50% Injectable 25 milliLiter(s) IV Push once  dextrose 50% Injectable 12.5 Gram(s) IV Push once  glucagon  Injectable 1 milliGRAM(s) IntraMuscular once PRN Glucose LESS THAN 70 milligrams/deciliter  hydrocortisone sodium succinate Injectable 50 milliGRAM(s) IV Push every 8 hours  insulin glargine Injectable (LANTUS) 5 Unit(s) SubCutaneous at bedtime  insulin lispro (HumaLOG) corrective regimen sliding scale   SubCutaneous three times a day before meals  insulin lispro (HumaLOG) corrective regimen sliding scale   SubCutaneous at bedtime  levothyroxine Injectable 68 MICROGram(s) IV Push at bedtime    Topical/Other Medications  fluticasone propionate 50 MICROgram(s)/spray Nasal Spray 1 Spray(s) Both Nostrils two times a day  naloxone Injectable 0.1 milliGRAM(s) IV Push every 3 minutes PRN For ANY of the following changes in patient status:  A. RR LESS THAN 10 breaths per minute, B. Oxygen saturation LESS THAN 90%, C. Sedation score of 6  sodium chloride 0.65% Nasal 1 Spray(s) Both Nostrils every 6 hours PRN Nasal Congestion    --------------------------------------------------------------------------------------    VITAL SIGNS, INS/OUTS (last 24 hours):  --------------------------------------------------------------------------------------  ((Insert SICU Vitals/Is+Os here))***    --------------------------------------------------------------------------------------    EXAM  NEUROLOGY  RASS:   	GCS:    Exam: Normal, NAD, alert, oriented x3, no focal deficits.     HEENT  Exam: Normocephalic, atraumatic, EOMI.     RESPIRATORY  Exam: Lungs clear to auscultation, Normal expansion/effort.   Mechanical Ventilation:     CARDIOVASCULAR  Exam: S1, S2.  Regular rate and rhythm.      GI/NUTRITION  Exam: soft, mildly distended, incision C/D/I with retention sutures  Current Diet:  NPO    VASCULAR  Exam: Extremities warm, pink, well-perfused.     MUSCULOSKELETAL  Exam: All extremities moving spontaneously without limitations.     SKIN  Exam: Good skin turgor, no skin breakdown.     METABOLIC/FLUIDS/ELECTROLYTES  lactated ringers. 1000 milliLiter(s) IV Continuous <Continuous>      HEMATOLOGIC  [x] VTE Prophylaxis: heparin  Injectable 5000 Unit(s) SubCutaneous every 8 hours    Transfusions:	[] PRBC	[] Platelets		[] FFP	[] Cryoprecipitate    INFECTIOUS DISEASE  Antimicrobials/Immunologic Medications:  influenza   Vaccine 0.5 milliLiter(s) IntraMuscular once    Day #      of     ***    Tubes/Lines/Drains  ***  [x] Peripheral IV  [] Central Venous Line     	[] R	[] L	[] IJ	[] Fem	[] SC	Date Placed:   [] Arterial Line		[] R	[] L	[] Fem	[] Rad	[] Ax	Date Placed:   [] PICC		[] Midline		[] Mediport  [] Urinary Catheter		Date Placed:   [x] Necessity of urinary, arterial, and venous catheters discussed    LABS  --------------------------------------------------------------------------------------  ((Insert SICU Labs here))***    --------------------------------------------------------------------------------------    OTHER LABORATORY:     IMAGING STUDIES:     CXR:  Pending SICU Daily Progress Note  =====================================================  Interval/Overnight Events:   Extubated  without complication. OOB to chair.  Spiking temps persistently. Cultures sent.       POD #  16/        	SICU Day #   3      HPI:  AFTAB BASS is a 63y Female w hx COPD 2/2 smoking on 3L O2 at home, moderate pulm HTN, CHRIS, DM, RA on chronic prednisone, HTN, CKD, CVA, who presented this admission on 12/15 c/o epigastric pain, n/v w CT evidence suggesting SBO. Was admitted to surgery and had ex-lap  w SHANNON but no transition point found; abdomen was closed w retention sutures. Pt was then kept briefly in SICU due to intentionally delayed extubation given pulm comorbidities. Went to floor  where has been doing well since.   Yesterday  pt was coughing and had sudden evisceration of bowels. Went to OR for another ex-lap w findings of intact, healthy bowel; was closed w 6 additional retention sutures. SICU consulted as pt was again intentionally kept extubated post-op due to concerns re pulm comorbidities. In the OR, pt was given 1500 cc crystalloid, had 300 cc uop; was given pushes of senia initially, and had temp 38.3 treated w tylenol (has known UTI). No pressor requirements at end of case.       Past Medical History:  Adult Hypothyroidism    Arthritis    Borderline Diabetes Mellitus    Chronic Asthma    Cigarette Smoker    CVA (Cerebral Vascular Accident)    Diverticulosis    H/O: HTN    High Cholesterol    RA (Rheumatoid Arthritis)    Tooth Abscess.     Past Surgical History:  History of  Section    Wrist Disorder  S/P Surgery.    Allergies: No Known Allergies      MEDICATIONS:   --------------------------------------------------------------------------------------  Neurologic Medications  gabapentin 300 milliGRAM(s) Oral three times a day  HYDROmorphone PCA (1 mG/mL) 30 milliLiter(s) PCA Continuous PCA Continuous  HYDROmorphone PCA (1 mG/mL) Rescue Clinician Bolus 0.5 milliGRAM(s) IV Push every 15 minutes PRN for Pain Scale GREATER THAN 6  ondansetron Injectable 4 milliGRAM(s) IV Push every 6 hours PRN Nausea  traMADol 75 milliGRAM(s) Oral every 8 hours PRN Moderate Pain (4 - 6)    Respiratory Medications  ALBUTerol    90 MICROgram(s) HFA Inhaler 1 Puff(s) Inhalation every 4 hours PRN Shortness of Breath and/or Wheezing  buDESOnide 160 MICROgram(s)/formoterol 4.5 MICROgram(s) Inhaler 2 Puff(s) Inhalation two times a day    Cardiovascular Medications  metoprolol tartrate Injectable 5 milliGRAM(s) IV Push every 6 hours    Gastrointestinal Medications  lactated ringers. 1000 milliLiter(s) IV Continuous <Continuous>  pantoprazole  Injectable 40 milliGRAM(s) IV Push daily    Genitourinary Medications    Hematologic/Oncologic Medications  heparin  Injectable 5000 Unit(s) SubCutaneous every 8 hours  influenza   Vaccine 0.5 milliLiter(s) IntraMuscular once    Antimicrobial/Immunologic Medications    Endocrine/Metabolic Medications  dextrose 50% Injectable 25 milliLiter(s) IV Push once  dextrose 50% Injectable 12.5 Gram(s) IV Push once  glucagon  Injectable 1 milliGRAM(s) IntraMuscular once PRN Glucose LESS THAN 70 milligrams/deciliter  hydrocortisone sodium succinate Injectable 50 milliGRAM(s) IV Push every 8 hours  insulin glargine Injectable (LANTUS) 5 Unit(s) SubCutaneous at bedtime  insulin lispro (HumaLOG) corrective regimen sliding scale   SubCutaneous three times a day before meals  insulin lispro (HumaLOG) corrective regimen sliding scale   SubCutaneous at bedtime  levothyroxine Injectable 68 MICROGram(s) IV Push at bedtime    Topical/Other Medications  fluticasone propionate 50 MICROgram(s)/spray Nasal Spray 1 Spray(s) Both Nostrils two times a day  naloxone Injectable 0.1 milliGRAM(s) IV Push every 3 minutes PRN For ANY of the following changes in patient status:  A. RR LESS THAN 10 breaths per minute, B. Oxygen saturation LESS THAN 90%, C. Sedation score of 6  sodium chloride 0.65% Nasal 1 Spray(s) Both Nostrils every 6 hours PRN Nasal Congestion    --------------------------------------------------------------------------------------    VITAL SIGNS, INS/OUTS (last 24 hours):  --------------------------------------------------------------------------------------  ICU Vital Signs Last 24 Hrs  T(C): 37.2 (2019 00:00), Max: 39.8 (31 Dec 2018 10:00)  T(F): 98.9 (2019 00:00), Max: 103.6 (31 Dec 2018 10:00)  HR: 86 (2019 03:33) (86 - 129)  BP: 92/48 (2019 03:00) (90/57 - 136/84)  BP(mean): 57 (2019 03:00) (57 - 97)  ABP: --  ABP(mean): --  RR: 15 (2019 03:00) (14 - 30)  SpO2: 98% (2019 03:33) (92% - 100%)    I&O's Summary    30 Dec 2018 07:01  -  31 Dec 2018 07:00  --------------------------------------------------------  IN: 2430.9 mL / OUT: 1080 mL / NET: 1350.9 mL    31 Dec 2018 07:  -  2019 05:35  --------------------------------------------------------  IN: 1705.7 mL / OUT: 1345 mL / NET: 360.7 mL    --------------------------------------------------------------------------------------    EXAM  NEUROLOGY  RASS:   	GCS:    Exam: Normal, NAD, alert, oriented x3, no focal deficits.     HEENT  Exam: Normocephalic, atraumatic, EOMI.     RESPIRATORY  Exam: Lungs clear to auscultation, Normal expansion/effort.   Mechanical Ventilation:     CARDIOVASCULAR  Exam: S1, S2.  Regular rate and rhythm.      GI/NUTRITION  Exam: soft, mildly distended, incision C/D/I with retention sutures  Current Diet:  NPO    VASCULAR  Exam: Extremities warm, pink, well-perfused.     MUSCULOSKELETAL  Exam: All extremities moving spontaneously without limitations.     SKIN  Exam: Good skin turgor, no skin breakdown.     METABOLIC/FLUIDS/ELECTROLYTES  lactated ringers. 1000 milliLiter(s) IV Continuous <Continuous>      HEMATOLOGIC  [x] VTE Prophylaxis: heparin  Injectable 5000 Unit(s) SubCutaneous every 8 hours    Transfusions:	[] PRBC	[] Platelets		[] FFP	[] Cryoprecipitate    INFECTIOUS DISEASE  Antimicrobials/Immunologic Medications:  influenza   Vaccine 0.5 milliLiter(s) IntraMuscular once      Tubes/Lines/Drains    [x] Peripheral IV  [] Central Venous Line     	[] R	[] L	[] IJ	[] Fem	[] SC	Date Placed:   [] Arterial Line		[] R	[] L	[] Fem	[] Rad	[] Ax	Date Placed:   [] PICC		[] Midline		[] Mediport  [] Urinary Catheter		Date Placed:   [x] Necessity of urinary, arterial, and venous catheters discussed    LABS  --------------------------------------------------------------------------------------                        7.3    21.19 )-----------( 393      ( 2019 02:22 )             23.8     -    142  |  105  |  10  ----------------------------<  190<H>  4.9   |  23  |  0.95    Ca    8.5      2019 02:20  Phos  4.2     -  Mg     1.7         PT/INR - ( 30 Dec 2018 17:32 )   PT: 12.1 SEC;   INR: 1.06     PTT - ( 30 Dec 2018 17:32 )  PTT:26.8 SECCXR:  Pending     --------------------------------------------------------------------------------------    OTHER LABORATORY:     IMAGING STUDIES: SICU Daily Progress Note  =====================================================  Interval/Overnight Events:   Extubated  without complication. OOB to chair.  Spiking temps persistently. Cultures sent.       POD #  16/        	SICU Day #   3      HPI:  AFTAB BASS is a 63y Female w hx COPD 2/2 smoking on 3L O2 at home, moderate pulm HTN, CHRIS, DM, RA on chronic prednisone, HTN, CKD, CVA, who presented this admission on 12/15 c/o epigastric pain, n/v w CT evidence suggesting SBO. Was admitted to surgery and had ex-lap  w SHANNON but no transition point found; abdomen was closed w retention sutures. Pt was then kept briefly in SICU due to intentionally delayed extubation given pulm comorbidities. Went to floor  where has been doing well since.   Yesterday  pt was coughing and had sudden evisceration of bowels. Went to OR for another ex-lap w findings of intact, healthy bowel; was closed w 6 additional retention sutures. SICU consulted as pt was again intentionally kept extubated post-op due to concerns re pulm comorbidities. In the OR, pt was given 1500 cc crystalloid, had 300 cc uop; was given pushes of senia initially, and had temp 38.3 treated w tylenol (has known UTI). No pressor requirements at end of case.       Past Medical History:  Adult Hypothyroidism    Arthritis    Borderline Diabetes Mellitus    Chronic Asthma    Cigarette Smoker    CVA (Cerebral Vascular Accident)    Diverticulosis    H/O: HTN    High Cholesterol    RA (Rheumatoid Arthritis)    Tooth Abscess.     Past Surgical History:  History of  Section    Wrist Disorder  S/P Surgery.    Allergies: No Known Allergies      MEDICATIONS:   --------------------------------------------------------------------------------------  Neurologic Medications  gabapentin 300 milliGRAM(s) Oral three times a day  HYDROmorphone PCA (1 mG/mL) 30 milliLiter(s) PCA Continuous PCA Continuous  HYDROmorphone PCA (1 mG/mL) Rescue Clinician Bolus 0.5 milliGRAM(s) IV Push every 15 minutes PRN for Pain Scale GREATER THAN 6  ondansetron Injectable 4 milliGRAM(s) IV Push every 6 hours PRN Nausea  traMADol 75 milliGRAM(s) Oral every 8 hours PRN Moderate Pain (4 - 6)    Respiratory Medications  ALBUTerol    90 MICROgram(s) HFA Inhaler 1 Puff(s) Inhalation every 4 hours PRN Shortness of Breath and/or Wheezing  buDESOnide 160 MICROgram(s)/formoterol 4.5 MICROgram(s) Inhaler 2 Puff(s) Inhalation two times a day    Cardiovascular Medications  metoprolol tartrate Injectable 5 milliGRAM(s) IV Push every 6 hours    Gastrointestinal Medications  lactated ringers. 1000 milliLiter(s) IV Continuous <Continuous>  pantoprazole  Injectable 40 milliGRAM(s) IV Push daily    Genitourinary Medications    Hematologic/Oncologic Medications  heparin  Injectable 5000 Unit(s) SubCutaneous every 8 hours  influenza   Vaccine 0.5 milliLiter(s) IntraMuscular once    Antimicrobial/Immunologic Medications    Endocrine/Metabolic Medications  dextrose 50% Injectable 25 milliLiter(s) IV Push once  dextrose 50% Injectable 12.5 Gram(s) IV Push once  glucagon  Injectable 1 milliGRAM(s) IntraMuscular once PRN Glucose LESS THAN 70 milligrams/deciliter  hydrocortisone sodium succinate Injectable 50 milliGRAM(s) IV Push every 8 hours  insulin glargine Injectable (LANTUS) 5 Unit(s) SubCutaneous at bedtime  insulin lispro (HumaLOG) corrective regimen sliding scale   SubCutaneous three times a day before meals  insulin lispro (HumaLOG) corrective regimen sliding scale   SubCutaneous at bedtime  levothyroxine Injectable 68 MICROGram(s) IV Push at bedtime    Topical/Other Medications  fluticasone propionate 50 MICROgram(s)/spray Nasal Spray 1 Spray(s) Both Nostrils two times a day  naloxone Injectable 0.1 milliGRAM(s) IV Push every 3 minutes PRN For ANY of the following changes in patient status:  A. RR LESS THAN 10 breaths per minute, B. Oxygen saturation LESS THAN 90%, C. Sedation score of 6  sodium chloride 0.65% Nasal 1 Spray(s) Both Nostrils every 6 hours PRN Nasal Congestion    --------------------------------------------------------------------------------------    VITAL SIGNS, INS/OUTS (last 24 hours):  --------------------------------------------------------------------------------------  ICU Vital Signs Last 24 Hrs  T(C): 37.2 (2019 00:00), Max: 39.8 (31 Dec 2018 10:00)  T(F): 98.9 (2019 00:00), Max: 103.6 (31 Dec 2018 10:00)  HR: 86 (2019 03:33) (86 - 129)  BP: 92/48 (2019 03:00) (90/57 - 136/84)  BP(mean): 57 (2019 03:00) (57 - 97)  ABP: --  ABP(mean): --  RR: 15 (2019 03:00) (14 - 30)  SpO2: 98% (2019 03:33) (92% - 100%)    I&O's Summary    30 Dec 2018 07:01  -  31 Dec 2018 07:00  --------------------------------------------------------  IN: 2430.9 mL / OUT: 1080 mL / NET: 1350.9 mL    31 Dec 2018 07:  -  2019 05:35  --------------------------------------------------------  IN: 1705.7 mL / OUT: 1345 mL / NET: 360.7 mL    --------------------------------------------------------------------------------------    EXAM  NEUROLOGY  Exam: Normal, NAD, alert, oriented x3, no focal deficits.     HEENT  Exam: Normocephalic, atraumatic, EOMI.     RESPIRATORY  Exam: Equal chest rise    CARDIOVASCULAR  Exam: S1, S2.  Regular rate and rhythm.      GI/NUTRITION  Exam: soft, mildly distended, incision C/D/I with retention sutures  Current Diet:  NPO    VASCULAR  Exam: Extremities warm, pink, well-perfused.     MUSCULOSKELETAL  Exam: All extremities moving spontaneously without limitations.     SKIN  Exam: Good skin turgor, no skin breakdown.     METABOLIC/FLUIDS/ELECTROLYTES  lactated ringers. 1000 milliLiter(s) IV Continuous <Continuous>      HEMATOLOGIC  [x] VTE Prophylaxis: heparin  Injectable 5000 Unit(s) SubCutaneous every 8 hours    Transfusions:	[] PRBC	[] Platelets		[] FFP	[] Cryoprecipitate    INFECTIOUS DISEASE  Antimicrobials/Immunologic Medications:  influenza   Vaccine 0.5 milliLiter(s) IntraMuscular once      Tubes/Lines/Drains    [x] Peripheral IV  [] Central Venous Line     	[] R	[] L	[] IJ	[] Fem	[] SC	Date Placed:   [] Arterial Line		[] R	[] L	[] Fem	[] Rad	[] Ax	Date Placed:   [] PICC		[] Midline		[] Mediport  [] Urinary Catheter		Date Placed:   [x] Necessity of urinary, arterial, and venous catheters discussed    LABS  --------------------------------------------------------------------------------------                        7.3    21.19 )-----------( 393      ( 2019 02:22 )             23.8     -    142  |  105  |  10  ----------------------------<  190<H>  4.9   |  23  |  0.95    Ca    8.5      2019 02:20  Phos  4.2     -  Mg     1.7         PT/INR - ( 30 Dec 2018 17:32 )   PT: 12.1 SEC;   INR: 1.06     PTT - ( 30 Dec 2018 17:32 )  PTT:26.8 SECCXR:  Pending     --------------------------------------------------------------------------------------    OTHER LABORATORY:     IMAGING STUDIES:

## 2019-01-02 LAB
BUN SERPL-MCNC: 10 MG/DL — SIGNIFICANT CHANGE UP (ref 7–23)
CA-I BLD-SCNC: 0.99 MMOL/L — LOW (ref 1.03–1.23)
CALCIUM SERPL-MCNC: 8.2 MG/DL — LOW (ref 8.4–10.5)
CHLORIDE SERPL-SCNC: 101 MMOL/L — SIGNIFICANT CHANGE UP (ref 98–107)
CO2 SERPL-SCNC: 20 MMOL/L — LOW (ref 22–31)
CREAT SERPL-MCNC: 0.94 MG/DL — SIGNIFICANT CHANGE UP (ref 0.5–1.3)
GLUCOSE BLDC GLUCOMTR-MCNC: 169 MG/DL — HIGH (ref 70–99)
GLUCOSE BLDC GLUCOMTR-MCNC: 205 MG/DL — HIGH (ref 70–99)
GLUCOSE BLDC GLUCOMTR-MCNC: 222 MG/DL — HIGH (ref 70–99)
GLUCOSE BLDC GLUCOMTR-MCNC: 229 MG/DL — HIGH (ref 70–99)
GLUCOSE BLDC GLUCOMTR-MCNC: 294 MG/DL — HIGH (ref 70–99)
GLUCOSE SERPL-MCNC: 188 MG/DL — HIGH (ref 70–99)
GRAM STN SPT: SIGNIFICANT CHANGE UP
HCT VFR BLD CALC: 23.6 % — LOW (ref 34.5–45)
HGB BLD-MCNC: 7.1 G/DL — LOW (ref 11.5–15.5)
MAGNESIUM SERPL-MCNC: 2.3 MG/DL — SIGNIFICANT CHANGE UP (ref 1.6–2.6)
MCHC RBC-ENTMCNC: 30.1 % — LOW (ref 32–36)
MCHC RBC-ENTMCNC: 30.9 PG — SIGNIFICANT CHANGE UP (ref 27–34)
MCV RBC AUTO: 102.6 FL — HIGH (ref 80–100)
NRBC # FLD: 0.03 — SIGNIFICANT CHANGE UP
PHOSPHATE SERPL-MCNC: 2.5 MG/DL — SIGNIFICANT CHANGE UP (ref 2.5–4.5)
PLATELET # BLD AUTO: 433 K/UL — HIGH (ref 150–400)
PMV BLD: 9.2 FL — SIGNIFICANT CHANGE UP (ref 7–13)
POTASSIUM SERPL-MCNC: 4.2 MMOL/L — SIGNIFICANT CHANGE UP (ref 3.5–5.3)
POTASSIUM SERPL-SCNC: 4.2 MMOL/L — SIGNIFICANT CHANGE UP (ref 3.5–5.3)
RBC # BLD: 2.3 M/UL — LOW (ref 3.8–5.2)
RBC # FLD: 15.6 % — HIGH (ref 10.3–14.5)
SODIUM SERPL-SCNC: 137 MMOL/L — SIGNIFICANT CHANGE UP (ref 135–145)
SPECIMEN SOURCE: SIGNIFICANT CHANGE UP
SPECIMEN SOURCE: SIGNIFICANT CHANGE UP
WBC # BLD: 14.01 K/UL — HIGH (ref 3.8–10.5)
WBC # FLD AUTO: 14.01 K/UL — HIGH (ref 3.8–10.5)

## 2019-01-02 PROCEDURE — 99232 SBSQ HOSP IP/OBS MODERATE 35: CPT

## 2019-01-02 PROCEDURE — 71045 X-RAY EXAM CHEST 1 VIEW: CPT | Mod: 26

## 2019-01-02 PROCEDURE — 74177 CT ABD & PELVIS W/CONTRAST: CPT | Mod: 26

## 2019-01-02 PROCEDURE — 71260 CT THORAX DX C+: CPT | Mod: 26

## 2019-01-02 PROCEDURE — 99233 SBSQ HOSP IP/OBS HIGH 50: CPT

## 2019-01-02 RX ORDER — SODIUM CHLORIDE 9 MG/ML
4 INJECTION INTRAMUSCULAR; INTRAVENOUS; SUBCUTANEOUS EVERY 6 HOURS
Qty: 0 | Refills: 0 | Status: DISCONTINUED | OUTPATIENT
Start: 2019-01-02 | End: 2019-01-02

## 2019-01-02 RX ORDER — OXYCODONE HYDROCHLORIDE 5 MG/1
15 TABLET ORAL
Qty: 0 | Refills: 0 | Status: DISCONTINUED | OUTPATIENT
Start: 2019-01-02 | End: 2019-01-06

## 2019-01-02 RX ORDER — INSULIN LISPRO 100/ML
VIAL (ML) SUBCUTANEOUS
Qty: 0 | Refills: 0 | Status: DISCONTINUED | OUTPATIENT
Start: 2019-01-02 | End: 2019-01-04

## 2019-01-02 RX ORDER — SODIUM CHLORIDE 9 MG/ML
4 INJECTION INTRAMUSCULAR; INTRAVENOUS; SUBCUTANEOUS EVERY 6 HOURS
Qty: 0 | Refills: 0 | Status: DISCONTINUED | OUTPATIENT
Start: 2019-01-02 | End: 2019-01-06

## 2019-01-02 RX ORDER — ACETAMINOPHEN 500 MG
975 TABLET ORAL ONCE
Qty: 0 | Refills: 0 | Status: COMPLETED | OUTPATIENT
Start: 2019-01-02 | End: 2019-01-02

## 2019-01-02 RX ORDER — PANTOPRAZOLE SODIUM 20 MG/1
40 TABLET, DELAYED RELEASE ORAL
Qty: 0 | Refills: 0 | Status: DISCONTINUED | OUTPATIENT
Start: 2019-01-02 | End: 2019-01-06

## 2019-01-02 RX ORDER — HYDROMORPHONE HYDROCHLORIDE 2 MG/ML
0.5 INJECTION INTRAMUSCULAR; INTRAVENOUS; SUBCUTANEOUS
Qty: 0 | Refills: 0 | Status: DISCONTINUED | OUTPATIENT
Start: 2019-01-02 | End: 2019-01-04

## 2019-01-02 RX ORDER — TRAMADOL HYDROCHLORIDE 50 MG/1
50 TABLET ORAL EVERY 6 HOURS
Qty: 0 | Refills: 0 | Status: DISCONTINUED | OUTPATIENT
Start: 2019-01-02 | End: 2019-01-06

## 2019-01-02 RX ORDER — HEPARIN SODIUM 5000 [USP'U]/ML
5000 INJECTION INTRAVENOUS; SUBCUTANEOUS EVERY 8 HOURS
Qty: 0 | Refills: 0 | Status: DISCONTINUED | OUTPATIENT
Start: 2019-01-02 | End: 2019-01-15

## 2019-01-02 RX ORDER — ACETAMINOPHEN 500 MG
650 TABLET ORAL ONCE
Qty: 0 | Refills: 0 | Status: COMPLETED | OUTPATIENT
Start: 2019-01-02 | End: 2019-01-02

## 2019-01-02 RX ORDER — SIMVASTATIN 20 MG/1
20 TABLET, FILM COATED ORAL AT BEDTIME
Qty: 0 | Refills: 0 | Status: DISCONTINUED | OUTPATIENT
Start: 2019-01-02 | End: 2019-01-06

## 2019-01-02 RX ORDER — VANCOMYCIN HCL 1 G
1000 VIAL (EA) INTRAVENOUS EVERY 12 HOURS
Qty: 0 | Refills: 0 | Status: DISCONTINUED | OUTPATIENT
Start: 2019-01-02 | End: 2019-01-04

## 2019-01-02 RX ORDER — HYDROCORTISONE 20 MG
50 TABLET ORAL EVERY 12 HOURS
Qty: 0 | Refills: 0 | Status: DISCONTINUED | OUTPATIENT
Start: 2019-01-02 | End: 2019-01-02

## 2019-01-02 RX ORDER — METOPROLOL TARTRATE 50 MG
7.5 TABLET ORAL EVERY 6 HOURS
Qty: 0 | Refills: 0 | Status: DISCONTINUED | OUTPATIENT
Start: 2019-01-02 | End: 2019-01-02

## 2019-01-02 RX ORDER — OXYCODONE HYDROCHLORIDE 5 MG/1
10 TABLET ORAL
Qty: 0 | Refills: 0 | Status: DISCONTINUED | OUTPATIENT
Start: 2019-01-02 | End: 2019-01-03

## 2019-01-02 RX ORDER — METOPROLOL TARTRATE 50 MG
25 TABLET ORAL
Qty: 0 | Refills: 0 | Status: DISCONTINUED | OUTPATIENT
Start: 2019-01-02 | End: 2019-01-06

## 2019-01-02 RX ORDER — HYDROCORTISONE 20 MG
50 TABLET ORAL EVERY 12 HOURS
Qty: 0 | Refills: 0 | Status: COMPLETED | OUTPATIENT
Start: 2019-01-02 | End: 2019-01-03

## 2019-01-02 RX ORDER — INSULIN LISPRO 100/ML
VIAL (ML) SUBCUTANEOUS AT BEDTIME
Qty: 0 | Refills: 0 | Status: DISCONTINUED | OUTPATIENT
Start: 2019-01-02 | End: 2019-01-04

## 2019-01-02 RX ORDER — LEVOTHYROXINE SODIUM 125 MCG
137 TABLET ORAL DAILY
Qty: 0 | Refills: 0 | Status: DISCONTINUED | OUTPATIENT
Start: 2019-01-02 | End: 2019-01-06

## 2019-01-02 RX ORDER — FUROSEMIDE 40 MG
20 TABLET ORAL ONCE
Qty: 0 | Refills: 0 | Status: COMPLETED | OUTPATIENT
Start: 2019-01-02 | End: 2019-01-02

## 2019-01-02 RX ORDER — INSULIN GLARGINE 100 [IU]/ML
8 INJECTION, SOLUTION SUBCUTANEOUS AT BEDTIME
Qty: 0 | Refills: 0 | Status: DISCONTINUED | OUTPATIENT
Start: 2019-01-02 | End: 2019-01-03

## 2019-01-02 RX ORDER — SODIUM CHLORIDE 9 MG/ML
3 INJECTION INTRAMUSCULAR; INTRAVENOUS; SUBCUTANEOUS EVERY 6 HOURS
Qty: 0 | Refills: 0 | Status: DISCONTINUED | OUTPATIENT
Start: 2019-01-02 | End: 2019-01-02

## 2019-01-02 RX ADMIN — Medication 20 MILLIGRAM(S): at 15:36

## 2019-01-02 RX ADMIN — SODIUM CHLORIDE 4 MILLILITER(S): 9 INJECTION INTRAMUSCULAR; INTRAVENOUS; SUBCUTANEOUS at 22:47

## 2019-01-02 RX ADMIN — Medication 250 MILLIGRAM(S): at 21:05

## 2019-01-02 RX ADMIN — SODIUM CHLORIDE 75 MILLILITER(S): 9 INJECTION, SOLUTION INTRAVENOUS at 08:08

## 2019-01-02 RX ADMIN — SODIUM CHLORIDE 4 MILLILITER(S): 9 INJECTION INTRAMUSCULAR; INTRAVENOUS; SUBCUTANEOUS at 16:30

## 2019-01-02 RX ADMIN — Medication 650 MILLIGRAM(S): at 12:25

## 2019-01-02 RX ADMIN — HYDROMORPHONE HYDROCHLORIDE 0.5 MILLIGRAM(S): 2 INJECTION INTRAMUSCULAR; INTRAVENOUS; SUBCUTANEOUS at 10:59

## 2019-01-02 RX ADMIN — Medication 50 MILLIGRAM(S): at 06:09

## 2019-01-02 RX ADMIN — SIMVASTATIN 20 MILLIGRAM(S): 20 TABLET, FILM COATED ORAL at 21:24

## 2019-01-02 RX ADMIN — Medication 0: at 21:23

## 2019-01-02 RX ADMIN — Medication 975 MILLIGRAM(S): at 21:07

## 2019-01-02 RX ADMIN — Medication 250 MILLIGRAM(S): at 10:26

## 2019-01-02 RX ADMIN — Medication 1 SPRAY(S): at 17:39

## 2019-01-02 RX ADMIN — HYDROMORPHONE HYDROCHLORIDE 0.5 MILLIGRAM(S): 2 INJECTION INTRAMUSCULAR; INTRAVENOUS; SUBCUTANEOUS at 17:39

## 2019-01-02 RX ADMIN — PANTOPRAZOLE SODIUM 40 MILLIGRAM(S): 20 TABLET, DELAYED RELEASE ORAL at 12:25

## 2019-01-02 RX ADMIN — HEPARIN SODIUM 5000 UNIT(S): 5000 INJECTION INTRAVENOUS; SUBCUTANEOUS at 06:09

## 2019-01-02 RX ADMIN — Medication 600 MILLIGRAM(S): at 03:41

## 2019-01-02 RX ADMIN — OXYCODONE HYDROCHLORIDE 15 MILLIGRAM(S): 5 TABLET ORAL at 19:51

## 2019-01-02 RX ADMIN — Medication 1 SPRAY(S): at 06:08

## 2019-01-02 RX ADMIN — TRAMADOL HYDROCHLORIDE 50 MILLIGRAM(S): 50 TABLET ORAL at 12:25

## 2019-01-02 RX ADMIN — Medication 137 MICROGRAM(S): at 21:08

## 2019-01-02 RX ADMIN — OXYCODONE HYDROCHLORIDE 15 MILLIGRAM(S): 5 TABLET ORAL at 09:50

## 2019-01-02 RX ADMIN — Medication 115 MILLIGRAM(S): at 12:26

## 2019-01-02 RX ADMIN — Medication 650 MILLIGRAM(S): at 12:27

## 2019-01-02 RX ADMIN — TRAMADOL HYDROCHLORIDE 50 MILLIGRAM(S): 50 TABLET ORAL at 12:27

## 2019-01-02 RX ADMIN — GABAPENTIN 300 MILLIGRAM(S): 400 CAPSULE ORAL at 21:08

## 2019-01-02 RX ADMIN — INSULIN GLARGINE 8 UNIT(S): 100 INJECTION, SOLUTION SUBCUTANEOUS at 21:23

## 2019-01-02 RX ADMIN — OXYCODONE HYDROCHLORIDE 15 MILLIGRAM(S): 5 TABLET ORAL at 15:25

## 2019-01-02 RX ADMIN — HEPARIN SODIUM 5000 UNIT(S): 5000 INJECTION INTRAVENOUS; SUBCUTANEOUS at 21:07

## 2019-01-02 RX ADMIN — PIPERACILLIN AND TAZOBACTAM 25 GRAM(S): 4; .5 INJECTION, POWDER, LYOPHILIZED, FOR SOLUTION INTRAVENOUS at 06:10

## 2019-01-02 RX ADMIN — PIPERACILLIN AND TAZOBACTAM 25 GRAM(S): 4; .5 INJECTION, POWDER, LYOPHILIZED, FOR SOLUTION INTRAVENOUS at 22:35

## 2019-01-02 RX ADMIN — GABAPENTIN 300 MILLIGRAM(S): 400 CAPSULE ORAL at 13:15

## 2019-01-02 RX ADMIN — Medication 2: at 08:09

## 2019-01-02 RX ADMIN — HEPARIN SODIUM 5000 UNIT(S): 5000 INJECTION INTRAVENOUS; SUBCUTANEOUS at 13:15

## 2019-01-02 RX ADMIN — HYDROMORPHONE HYDROCHLORIDE 30 MILLILITER(S): 2 INJECTION INTRAMUSCULAR; INTRAVENOUS; SUBCUTANEOUS at 07:28

## 2019-01-02 RX ADMIN — TRAMADOL HYDROCHLORIDE 50 MILLIGRAM(S): 50 TABLET ORAL at 17:39

## 2019-01-02 RX ADMIN — BUDESONIDE AND FORMOTEROL FUMARATE DIHYDRATE 2 PUFF(S): 160; 4.5 AEROSOL RESPIRATORY (INHALATION) at 08:54

## 2019-01-02 RX ADMIN — Medication 2: at 12:26

## 2019-01-02 RX ADMIN — TRAMADOL HYDROCHLORIDE 50 MILLIGRAM(S): 50 TABLET ORAL at 17:38

## 2019-01-02 RX ADMIN — HYDROMORPHONE HYDROCHLORIDE 0.5 MILLIGRAM(S): 2 INJECTION INTRAMUSCULAR; INTRAVENOUS; SUBCUTANEOUS at 17:54

## 2019-01-02 RX ADMIN — BUDESONIDE AND FORMOTEROL FUMARATE DIHYDRATE 2 PUFF(S): 160; 4.5 AEROSOL RESPIRATORY (INHALATION) at 22:47

## 2019-01-02 RX ADMIN — HYDROMORPHONE HYDROCHLORIDE 0.5 MILLIGRAM(S): 2 INJECTION INTRAMUSCULAR; INTRAVENOUS; SUBCUTANEOUS at 21:16

## 2019-01-02 RX ADMIN — OXYCODONE HYDROCHLORIDE 15 MILLIGRAM(S): 5 TABLET ORAL at 09:35

## 2019-01-02 RX ADMIN — Medication 1: at 16:30

## 2019-01-02 RX ADMIN — HYDROMORPHONE HYDROCHLORIDE 0.5 MILLIGRAM(S): 2 INJECTION INTRAMUSCULAR; INTRAVENOUS; SUBCUTANEOUS at 10:44

## 2019-01-02 RX ADMIN — PIPERACILLIN AND TAZOBACTAM 25 GRAM(S): 4; .5 INJECTION, POWDER, LYOPHILIZED, FOR SOLUTION INTRAVENOUS at 13:15

## 2019-01-02 RX ADMIN — OXYCODONE HYDROCHLORIDE 15 MILLIGRAM(S): 5 TABLET ORAL at 15:40

## 2019-01-02 RX ADMIN — HYDROMORPHONE HYDROCHLORIDE 0.5 MILLIGRAM(S): 2 INJECTION INTRAMUSCULAR; INTRAVENOUS; SUBCUTANEOUS at 21:31

## 2019-01-02 RX ADMIN — ALBUTEROL 1 PUFF(S): 90 AEROSOL, METERED ORAL at 15:00

## 2019-01-02 RX ADMIN — GABAPENTIN 300 MILLIGRAM(S): 400 CAPSULE ORAL at 06:10

## 2019-01-02 RX ADMIN — Medication 10000 UNIT(S): at 12:26

## 2019-01-02 RX ADMIN — Medication 975 MILLIGRAM(S): at 21:37

## 2019-01-02 RX ADMIN — OXYCODONE HYDROCHLORIDE 15 MILLIGRAM(S): 5 TABLET ORAL at 19:21

## 2019-01-02 RX ADMIN — Medication 50 MILLIGRAM(S): at 17:38

## 2019-01-02 RX ADMIN — TIOTROPIUM BROMIDE 1 CAPSULE(S): 18 CAPSULE ORAL; RESPIRATORY (INHALATION) at 08:56

## 2019-01-02 NOTE — PROGRESS NOTE ADULT - ATTENDING COMMENTS
63y Female w hx COPD on 3L O2 at home, moderate pulm HTN (as shown on echo from 2017), CHRIS (Untreated), DM, RA on chronic prednisone, HTN, CKD, CVA, who presented this admission on 12/15 c/o epigastric pain, n/v w CT evidence suggesting SBO. Was admitted to surg and had ex-lap 12/16 w SHANNON but no transition point found; abdomen was closed w retention sutures. Pt was then kept briefly in SICU due to intentional delayed extubation given pulm comorbidities. Went to floor 12/18 where has been doing well since.   Today 12/30 pt was coughing and had sudden evisceration of bowels. Went to OR for another ex-lap w findings of intact bowel; abdomen was closed w 6 additional retention sutures.   SICU consulted as pt was kept extubated post-op due to concerns re pulm comorbidities. In the OR, pt was given 1500 cc crystalloid, had 300 cc uop; was given pushes of senia initially, and had temp 38.3 treated w tylenol (has known UTI). No pressor requirements at end of case.    #neuro  - pain ctrl prn with PCA     #resp  - hx COPD and pulm HTN, current smoker  - c/w Proventil, Symbicort, Spiriva  - continue mucinex  - Sputum culture    #cv  - off pressors post-op  - HR 90s, bp 140s/70s  - hx HTN  - also takes lasix at home, held recently per pulm recs given pt w/o current signs pulm edema  - increase to metoprolol 7.5 q6  - monitor uop, lactate    #GI  - s/p ex-lap  for evisceration, retention sutures placed  - await GI function  - c/w protonix daily  - Advance to soft diet with ensure    #renal  - hx CKD  - BUN/Crt wnl  - monitor I's&O's  - repletions PRN  - d/c myrtle TOMONTY  - Restart lasix later today  - D5 1/2NS @ 75cc/h    #heme  - H/H stable during admission prior to OR  - sqh for dvt ppx    #ID  - trend fevers  - f/u UA  - Started vanc/zosyn    #endo  - hx RA on chronic prednisone, DM  - continue solu-cortef  - vitamin A to assist wound healing  - c/w lantus 10, ISS  - continue synthroid, endo following    #dispo  -SICU      Critical Care Dx:  - Respiratory failure  - CHRIS  - Morbid obesity  - Evisceration  - Severe protein/calorie malnutrition  - Pulmonary HTN  - COPD  - Adrenal insufficiency  The patient is a critical care patient with life threatening hemodynamic and metabolic instability in SICU.  I have personally interviewed when possible and examined the patient, reviewed data and laboratory tests/x-rays and all pertinent electronic images.  I was physically present for the key portions of the evaluation and management (E/M) service provided.   The SICU team has a constant risk benefit analyzes discussion with the primary team, all consultants, House Staff and PA's on all decisions.  These diagnoses are unrelated to the surgical procedure noted above.  I meet with family if needed to get further history, discuss the case and make care decisions for this patient who might not be able to participate.  Time involved in performance of separately billable procedures was not counted toward my critical care time. There is no overlap.  I spent 55-75 minutes of critical care time for the diagnoses, assessment, plan and interventions.

## 2019-01-02 NOTE — PROGRESS NOTE ADULT - SUBJECTIVE AND OBJECTIVE BOX
Surgery Progress Note    S: Patient seen and examined. Febrile to 38.2 overnight.  Tolerating 2L NC.  Pain is controlled with PCA.  Denies nausea, vomiting.       Vital Signs Last 24 Hrs  T(C): 38.3 (02 Jan 2019 08:00), Max: 38.3 (02 Jan 2019 08:00)  T(F): 101 (02 Jan 2019 08:00), Max: 101 (02 Jan 2019 08:00)  HR: 114 (02 Jan 2019 10:01) (85 - 114)  BP: 119/88 (02 Jan 2019 09:00) (88/73 - 176/101)  BP(mean): 91 (02 Jan 2019 09:00) (47 - 113)  RR: 20 (02 Jan 2019 09:00) (13 - 25)  SpO2: 96% (02 Jan 2019 10:01) (93% - 98%)    I&O's Detail    01 Jan 2019 07:01  -  02 Jan 2019 07:00  --------------------------------------------------------  IN:    dextrose 5% + sodium chloride 0.45%.: 1800 mL    IV PiggyBack: 200 mL  Total IN: 2000 mL    OUT:    Indwelling Catheter - Urethral: 905 mL  Total OUT: 905 mL    Total NET: 1095 mL      02 Jan 2019 07:01  -  02 Jan 2019 10:49  --------------------------------------------------------  IN:    dextrose 5% + sodium chloride 0.45%.: 225 mL  Total IN: 225 mL    OUT:    Indwelling Catheter - Urethral: 110 mL  Total OUT: 110 mL    Total NET: 115 mL          MEDICATIONS  (STANDING):  buDESOnide 160 MICROgram(s)/formoterol 4.5 MICROgram(s) Inhaler 2 Puff(s) Inhalation two times a day  dexmedetomidine Infusion 0.2 MICROgram(s)/kG/Hr (5.73 mL/Hr) IV Continuous <Continuous>  dextrose 50% Injectable 25 milliLiter(s) IV Push once  dextrose 50% Injectable 12.5 Gram(s) IV Push once  fluticasone propionate 50 MICROgram(s)/spray Nasal Spray 1 Spray(s) Both Nostrils two times a day  heparin  Injectable 5000 Unit(s) SubCutaneous every 8 hours  hydrocortisone sodium succinate Injectable 50 milliGRAM(s) IV Push every 8 hours  influenza   Vaccine 0.5 milliLiter(s) IntraMuscular once  insulin glargine Injectable (LANTUS) 5 Unit(s) SubCutaneous at bedtime  insulin lispro (HumaLOG) corrective regimen sliding scale   SubCutaneous three times a day before meals  insulin lispro (HumaLOG) corrective regimen sliding scale   SubCutaneous at bedtime  lactated ringers. 1000 milliLiter(s) (75 mL/Hr) IV Continuous <Continuous>  levothyroxine Injectable 68 MICROGram(s) IV Push at bedtime  metoprolol tartrate Injectable 5 milliGRAM(s) IV Push every 6 hours  pantoprazole  Injectable 40 milliGRAM(s) IV Push daily  tiotropium 18 MICROgram(s) Capsule 1 Capsule(s) Inhalation daily    MEDICATIONS  (PRN):  ALBUTerol    90 MICROgram(s) HFA Inhaler 1 Puff(s) Inhalation every 4 hours PRN Shortness of Breath and/or Wheezing  glucagon  Injectable 1 milliGRAM(s) IntraMuscular once PRN Glucose LESS THAN 70 milligrams/deciliter  sodium chloride 0.65% Nasal 1 Spray(s) Both Nostrils every 6 hours PRN Nasal Congestion            Physical Exam:  Gen: Laying in bed, NAD  Resp: Unlabored breathing  Abd: soft, NT, ND, incision c/d/i with retention sutures in place   Ext: WWP  Skin: No rashes                                                7.1    14.01 )-----------( 433      ( 02 Jan 2019 08:20 )             23.6     01-02    137  |  101  |  10  ----------------------------<  188<H>  4.2   |  20<L>  |  0.94    Ca    8.2<L>      02 Jan 2019 08:20  Phos  2.5     01-02  Mg     2.3     01-02    TPro  5.6<L>  /  Alb  2.7<L>  /  TBili  < 0.2<L>  /  DBili  0.1  /  AST  24  /  ALT  10  /  AlkPhos  68  01-01

## 2019-01-02 NOTE — PROGRESS NOTE ADULT - ASSESSMENT
63y Female w hx COPD on 3L O2 at home, moderate pulm HTN (as shown on echo from 2017), CHRIS (Untreated), DM, RA on chronic prednisone, HTN, CKD, CVA, who presented this admission on 12/15 c/o epigastric pain, n/v w CT evidence suggesting SBO. Was admitted to surg and had ex-lap 12/16 w SHANNON but no transition point found; abdomen was closed w retention sutures. Pt was then kept briefly in SICU due to intentional delayed extubation given pulm comorbidities. Went to floor 12/18 where has been doing well since.   Today 12/30 pt was coughing and had sudden evisceration of bowels. Went to OR for another ex-lap w findings of intact bowel; abdomen was closed w 6 additional retention sutures.   SICU consulted as pt was kept extubated post-op due to concerns re pulm comorbidities. In the OR, pt was given 1500 cc crystalloid, had 300 cc uop; was given pushes of senia initially, and had temp 38.3 treated w tylenol (has known UTI). No pressor requirements at end of case.    #neuro  - pain ctrl prn with PCA     #resp  - hx COPD and pulm HTN, current smoker  - c/w Proventil, Symbicort, Spiriva    #cv  - off pressors post-op  - HR 90s, bp 140s/70s  - hx HTN  - also takes lasix at home, held recently per pulm recs given pt w/o current signs pulm edema  - metoprolol 5 q6 as tolerated  - monitor uop, lactate    #GI  - s/p ex-lap  for evisceration, retention sutures placed  - await GI function  - c/w protonix daily    #renal  - hx CKD  - BUN/Crt wnl  - monitor I's&O's  - repletions PRN  - D5 1/2NS @ 75cc/h    #heme  - H/H stable during admission prior to OR  - sqh for dvt ppx    #ID  - trend fevers  - f/u UA  - Started vanc/zosyn    #endo  - hx RA on chronic prednisone, DM  - continue solu-cortef  - start vitamin A to assist wound healing  - c/w lantus 10, ISS  - continue synthroid, endo following    #dispo  -SICU      Critical Care Dx:  - Respiratory failure  - CHRIS  - Morbid obesity  - Evisceration  - Severe protein/calorie malnutrition  - Pulmonary HTN  - COPD  - Adrenal insufficiency 63y Female w hx COPD on 3L O2 at home, moderate pulm HTN (as shown on echo from 2017), CHRIS (Untreated), DM, RA on chronic prednisone, HTN, CKD, CVA, who presented this admission on 12/15 c/o epigastric pain, n/v w CT evidence suggesting SBO. Was admitted to surg and had ex-lap 12/16 w SHANNON but no transition point found; abdomen was closed w retention sutures. Pt was then kept briefly in SICU due to intentional delayed extubation given pulm comorbidities. Went to floor 12/18 where has been doing well since.   Today 12/30 pt was coughing and had sudden evisceration of bowels. Went to OR for another ex-lap w findings of intact bowel; abdomen was closed w 6 additional retention sutures.   SICU consulted as pt was kept extubated post-op due to concerns re pulm comorbidities. In the OR, pt was given 1500 cc crystalloid, had 300 cc uop; was given pushes of senia initially, and had temp 38.3 treated w tylenol (has known UTI). No pressor requirements at end of case.    #neuro  - pain ctrl prn with PCA     #resp  - hx COPD and pulm HTN, current smoker  - c/w Proventil, Symbicort, Spiriva  - continue mucinex  - Sputum culture    #cv  - off pressors post-op  - HR 90s, bp 140s/70s  - hx HTN  - also takes lasix at home, held recently per pulm recs given pt w/o current signs pulm edema  - increase to metoprolol 7.5 q6  - monitor uop, lactate    #GI  - s/p ex-lap  for evisceration, retention sutures placed  - await GI function  - c/w protonix daily  - Advance to soft diet with ensure    #renal  - hx CKD  - BUN/Crt wnl  - monitor I's&O's  - repletions PRN  - d/c myrtle TOMONTY  - Restart lasix later today  - D5 1/2NS @ 75cc/h    #heme  - H/H stable during admission prior to OR  - sqh for dvt ppx    #ID  - trend fevers  - f/u UA  - Started vanc/zosyn    #endo  - hx RA on chronic prednisone, DM  - continue solu-cortef  - vitamin A to assist wound healing  - c/w lantus 10, ISS  - continue synthroid, endo following    #dispo  -SICU      Critical Care Dx:  - Respiratory failure  - CHRIS  - Morbid obesity  - Evisceration  - Severe protein/calorie malnutrition  - Pulmonary HTN  - COPD  - Adrenal insufficiency

## 2019-01-02 NOTE — PROGRESS NOTE ADULT - PROBLEM SELECTOR PLAN 3
On hydrocortisone taper with plan to change to PO prednisone tomorrow.  Would not decrease prednisone below 10 mg which is her maintenance dose without discussing with her outpatient physicians. Also she had fever overnight and given stress state and potential for adrenal insufficiency if lowering steroid too much. Ok to proceed with prednisone 10mg daily. If she becomes hypotensive on this dose consider increasing to 15-20mg daily from the adrenal standpoint.

## 2019-01-02 NOTE — PROGRESS NOTE ADULT - SUBJECTIVE AND OBJECTIVE BOX
SICU  Progress Note    Interval/Overnight Events:   Extubated  without complication. OOB to chair.  Patient with acid reflux - tums prn started. PAtient with mucous/cough - mucinex prn started.     HPI:  AFTAB BASS is a 63y Female w hx COPD 2/2 smoking on 3L O2 at home, moderate pulm HTN, CHRIS, DM, RA on chronic prednisone, HTN, CKD, CVA, who presented this admission on 12/15 c/o epigastric pain, n/v w CT evidence suggesting SBO. Was admitted to surgery and had ex-lap  w SHANNON but no transition point found; abdomen was closed w retention sutures. Pt was then kept briefly in SICU due to intentionally delayed extubation given pulm comorbidities. Went to floor  where has been doing well since.   Yesterday  pt was coughing and had sudden evisceration of bowels. Went to OR for another ex-lap w findings of intact, healthy bowel; was closed w 6 additional retention sutures. SICU consulted as pt was again intentionally kept extubated post-op due to concerns re pulm comorbidities. In the OR, pt was given 1500 cc crystalloid, had 300 cc uop; was given pushes of senia initially, and had temp 38.3 treated w tylenol (has known UTI). No pressor requirements at end of case.    Vital Signs Last 24 Hrs  T(C): 37.1 (2019 00:00), Max: 37.9 (2019 16:00)  T(F): 98.8 (2019 00:00), Max: 100.3 (2019 16:00)  HR: 90 (2019 00:19) (85 - 155)  BP: 105/47 (2019 00:00) (79/56 - 131/105)  BP(mean): 62 (2019 00:00) (47 - 111)  RR: 14 (2019 00:00) (13 - 25)  SpO2: 95% (2019 00:19) (93% - 100%)    EXAM  NEUROLOGY  Exam: Normal, NAD, alert, oriented x3, no focal deficits.     HEENT  Exam: Normocephalic, atraumatic, EOMI.     RESPIRATORY  Exam: Equal chest rise    CARDIOVASCULAR  Exam: S1, S2.  Regular rate and rhythm.      GI/NUTRITION  Exam: soft, mildly distended, incision C/D/I with retention sutures  Current Diet:  NPO    VASCULAR  Exam: Extremities warm, pink, well-perfused.     MUSCULOSKELETAL  Exam: All extremities moving spontaneously without limitations.     SKIN  Exam: Good skin turgor, no skin breakdown.       LABS:                        7.3    21.19 )-----------( 393      ( 2019 02:22 )             23.8         142  |  105  |  10  ----------------------------<  190<H>  4.9   |  23  |  0.95    Ca    8.5      2019 02:20  Phos  4.2       Mg     1.7         TPro  5.6<L>  /  Alb  2.7<L>  /  TBili  < 0.2<L>  /  DBili  0.1  /  AST  24  /  ALT  10  /  AlkPhos  68        Urinalysis Basic - ( 2019 10:14 )    Color: YELLOW / Appearance: HAZY / S.024 / pH: 5.0  Gluc: NEGATIVE / Ketone: 15  / Bili: SMALL / Urobili: NORMAL   Blood: MODERATE / Protein: 100 / Nitrite: NEGATIVE   Leuk Esterase: NEGATIVE / RBC: 5-10 / WBC 0-2   Sq Epi: x / Non Sq Epi: FEW / Bacteria: SMALL        INs and OUTs:    18 @ 07:  -  19 @ 07:00  --------------------------------------------------------  IN: 2105.7 mL / OUT: 1600 mL / NET: 505.7 mL    19 @ 07:01  -  19 @ 01:03  --------------------------------------------------------  IN: 1375 mL / OUT: 620 mL / NET: 755 mL SICU  Progress Note    Interval/Overnight Events: Was on nasal BIPAP for about 4 hours but refused after that due to coughing. Was OOB to chair. Patient with acid reflux - tums prn started. Patient with mucous/cough - mucinex prn started.     HPI:  AFTAB BASS is a 63y Female w hx COPD 2/2 smoking on 3L O2 at home, moderate pulm HTN, CHRIS, DM, RA on chronic prednisone, HTN, CKD, CVA, who presented this admission on 12/15 c/o epigastric pain, n/v w CT evidence suggesting SBO. Was admitted to surgery and had ex-lap  w SHANNON but no transition point found; abdomen was closed w retention sutures. Pt was then kept briefly in SICU due to intentionally delayed extubation given pulm comorbidities. Went to floor  where has been doing well since.   Yesterday  pt was coughing and had sudden evisceration of bowels. Went to OR for another ex-lap w findings of intact, healthy bowel; was closed w 6 additional retention sutures. SICU consulted as pt was again intentionally kept extubated post-op due to concerns re pulm comorbidities. In the OR, pt was given 1500 cc crystalloid, had 300 cc uop; was given pushes of senia initially, and had temp 38.3 treated w tylenol (has known UTI). No pressor requirements at end of case.    Vital Signs Last 24 Hrs  T(C): 37.1 (2019 00:00), Max: 37.9 (2019 16:00)  T(F): 98.8 (2019 00:00), Max: 100.3 (2019 16:00)  HR: 90 (2019 00:19) (85 - 155)  BP: 105/47 (2019 00:00) (79/56 - 131/105)  BP(mean): 62 (2019 00:00) (47 - 111)  RR: 14 (2019 00:00) (13 - 25)  SpO2: 95% (2019 00:19) (93% - 100%)    EXAM  NEUROLOGY  Exam: Normal, NAD, alert, oriented x3, no focal deficits.     HEENT  Exam: Normocephalic, atraumatic, EOMI.     RESPIRATORY  Exam: Equal chest rise    CARDIOVASCULAR  Exam: S1, S2.  Regular rate and rhythm.      GI/NUTRITION  Exam: soft, mildly distended and tender, incision C/D/I with retention sutures  Current Diet:  NPO    VASCULAR  Exam: Extremities warm, pink, well-perfused.     MUSCULOSKELETAL  Exam: All extremities moving spontaneously without limitations.     SKIN  Exam: Good skin turgor, no skin breakdown.       LABS:                        7.3    21.19 )-----------( 393      ( 2019 02:22 )             23.8         142  |  105  |  10  ----------------------------<  190<H>  4.9   |  23  |  0.95    Ca    8.5      2019 02:20  Phos  4.2       Mg     1.7         TPro  5.6<L>  /  Alb  2.7<L>  /  TBili  < 0.2<L>  /  DBili  0.1  /  AST  24  /  ALT  10  /  AlkPhos  68        Urinalysis Basic - ( 2019 10:14 )    Color: YELLOW / Appearance: HAZY / S.024 / pH: 5.0  Gluc: NEGATIVE / Ketone: 15  / Bili: SMALL / Urobili: NORMAL   Blood: MODERATE / Protein: 100 / Nitrite: NEGATIVE   Leuk Esterase: NEGATIVE / RBC: 5-10 / WBC 0-2   Sq Epi: x / Non Sq Epi: FEW / Bacteria: SMALL        INs and OUTs:    18 @ 07:19 @ 07:00  --------------------------------------------------------  IN: 2105.7 mL / OUT: 1600 mL / NET: 505.7 mL    19 @ 07:01  -  19 @ 01:03  --------------------------------------------------------  IN: 1375 mL / OUT: 620 mL / NET: 755 mL

## 2019-01-02 NOTE — PROGRESS NOTE ADULT - SUBJECTIVE AND OBJECTIVE BOX
Anesthesia Pain Management Service    SUBJECTIVE: Pt now off IV PCA without problems reported.    Therapy:	  [ X] IV PCA	   [ ] Epidural           [ ] s/p Spinal Opoid              [ ] Postpartum infusion	  [ ] Patient controlled regional anesthesia (PCRA)    [ ] prn Analgesics    Allergies    No Known Allergies    Intolerances      MEDICATIONS  (STANDING):  buDESOnide 160 MICROgram(s)/formoterol 4.5 MICROgram(s) Inhaler 2 Puff(s) Inhalation two times a day  dextrose 50% Injectable 25 milliLiter(s) IV Push once  dextrose 50% Injectable 12.5 Gram(s) IV Push once  fluticasone propionate 50 MICROgram(s)/spray Nasal Spray 1 Spray(s) Both Nostrils two times a day  gabapentin 300 milliGRAM(s) Oral three times a day  heparin  Injectable 5000 Unit(s) SubCutaneous every 8 hours  hydrocortisone sodium succinate Injectable 50 milliGRAM(s) IV Push every 12 hours  influenza   Vaccine 0.5 milliLiter(s) IntraMuscular once  insulin glargine Injectable (LANTUS) 5 Unit(s) SubCutaneous at bedtime  insulin lispro (HumaLOG) corrective regimen sliding scale   SubCutaneous three times a day before meals  insulin lispro (HumaLOG) corrective regimen sliding scale   SubCutaneous at bedtime  levothyroxine 137 MICROGram(s) Oral daily  metoprolol tartrate IVPB 7.5 milliGRAM(s) IV Intermittent every 6 hours  pantoprazole    Tablet 40 milliGRAM(s) Oral before breakfast  piperacillin/tazobactam IVPB. 3.375 Gram(s) IV Intermittent every 8 hours  simvastatin 20 milliGRAM(s) Oral at bedtime  tiotropium 18 MICROgram(s) Capsule 1 Capsule(s) Inhalation daily  traMADol 50 milliGRAM(s) Oral every 6 hours  vancomycin  IVPB 1000 milliGRAM(s) IV Intermittent every 12 hours  vitamin A 57391 Unit(s) Oral daily    MEDICATIONS  (PRN):  ALBUTerol    90 MICROgram(s) HFA Inhaler 1 Puff(s) Inhalation every 4 hours PRN Shortness of Breath and/or Wheezing  benzocaine 15 mG/menthol 3.6 mG (Sugar-Free) Lozenge 1 Lozenge Oral every 4 hours PRN Sore Throat  calcium carbonate    500 mG (Tums) Chewable 1 Tablet(s) Chew every 4 hours PRN Heartburn  glucagon  Injectable 1 milliGRAM(s) IntraMuscular once PRN Glucose LESS THAN 70 milligrams/deciliter  guaiFENesin  milliGRAM(s) Oral every 12 hours PRN Cough  HYDROmorphone  Injectable 0.5 milliGRAM(s) IV Push every 3 hours PRN breakthrough pain  oxyCODONE    IR 10 milliGRAM(s) Oral every 3 hours PRN mild to moderate pain  oxyCODONE    IR 15 milliGRAM(s) Oral every 3 hours PRN Severe Pain (7 - 10)  sodium chloride 0.65% Nasal 1 Spray(s) Both Nostrils every 6 hours PRN Nasal Congestion      OBJECTIVE:   [X] No new signs     [ ] Other:    Side Effects:  [X ] None			[ ] Other:    Assessment of Catheter Site:		[ ] Intact		[ ] Other:    ASSESSMENT/PLAN  [ ] Continue current therapy    [X ] Therapy changed to:    [ ] IV PCA       [ ] Epidural     [ X] prn Analgesics     Comments: PRN Oral/IV opioids and/or non-opioid adjuvant analgesics to be used at this point. Pt agrees with current Tramadol and OxyIR plus adjuvant medication.    Progress Note written now but Patient was seen earlier.

## 2019-01-02 NOTE — PROGRESS NOTE ADULT - PROBLEM SELECTOR PLAN 1
S/p returned to OR, NPO, now diet advanced to soft diet.  If tolerating po can continue off IV dextrose.  - Given hyperglycemia would increase Lantus to 8 units qhs   -Steroid taper is noted and will lead to decreased insulin requirements once transitioned from IV hydrocortisone to po prednisone.  -Continue low correction scales - if continued hyperglycemia tomorrow consider increasing to moderate.    - d/c likely on prandin, sitagliptin and likely lower dose tresiba pending FSG trends  -Follow up with Dr. Saul as outpatient.

## 2019-01-02 NOTE — PROGRESS NOTE ADULT - SUBJECTIVE AND OBJECTIVE BOX
Anesthesia Pain Management Service    SUBJECTIVE: Patient reports 8/10 pain in her back, knees and abdominal surgical site.    Pain Scale Score	At rest: __8/10_ 	With Activity: ___ 	[X ] Refer to charted pain scores    THERAPY:    [ ] IV PCA Morphine		[ ] 5 mg/mL	[ ] 1 mg/mL  [X ] IV PCA Hydromorphone	[ ] 5 mg/mL	[X ] 1 mg/mL  [ ] IV PCA Fentanyl		[ ] 50 micrograms/mL    Demand dose __0.2_ lockout __6_ (minutes) Continuous Rate _0__ Total: __9.2_   mg used (in past 24 hrs)      MEDICATIONS  (STANDING):  buDESOnide 160 MICROgram(s)/formoterol 4.5 MICROgram(s) Inhaler 2 Puff(s) Inhalation two times a day  dextrose 5% + sodium chloride 0.45%. 1000 milliLiter(s) (75 mL/Hr) IV Continuous <Continuous>  dextrose 50% Injectable 25 milliLiter(s) IV Push once  dextrose 50% Injectable 12.5 Gram(s) IV Push once  fluticasone propionate 50 MICROgram(s)/spray Nasal Spray 1 Spray(s) Both Nostrils two times a day  gabapentin 300 milliGRAM(s) Oral three times a day  heparin  Injectable 5000 Unit(s) SubCutaneous every 8 hours  hydrocortisone sodium succinate Injectable 50 milliGRAM(s) IV Push every 8 hours  influenza   Vaccine 0.5 milliLiter(s) IntraMuscular once  insulin glargine Injectable (LANTUS) 5 Unit(s) SubCutaneous at bedtime  insulin lispro (HumaLOG) corrective regimen sliding scale   SubCutaneous three times a day before meals  insulin lispro (HumaLOG) corrective regimen sliding scale   SubCutaneous at bedtime  levothyroxine Injectable 103 MICROGram(s) IV Push at bedtime  metoprolol tartrate Injectable 5 milliGRAM(s) IV Push every 6 hours  pantoprazole  Injectable 40 milliGRAM(s) IV Push daily  piperacillin/tazobactam IVPB. 3.375 Gram(s) IV Intermittent every 8 hours  tiotropium 18 MICROgram(s) Capsule 1 Capsule(s) Inhalation daily  traMADol 50 milliGRAM(s) Oral every 6 hours  vitamin A 06494 Unit(s) Oral daily    MEDICATIONS  (PRN):  ALBUTerol    90 MICROgram(s) HFA Inhaler 1 Puff(s) Inhalation every 4 hours PRN Shortness of Breath and/or Wheezing  benzocaine 15 mG/menthol 3.6 mG (Sugar-Free) Lozenge 1 Lozenge Oral every 4 hours PRN Sore Throat  calcium carbonate    500 mG (Tums) Chewable 1 Tablet(s) Chew every 4 hours PRN Heartburn  glucagon  Injectable 1 milliGRAM(s) IntraMuscular once PRN Glucose LESS THAN 70 milligrams/deciliter  guaiFENesin  milliGRAM(s) Oral every 12 hours PRN Cough  HYDROmorphone  Injectable 0.5 milliGRAM(s) IV Push every 3 hours PRN breakthrough pain  oxyCODONE    IR 10 milliGRAM(s) Oral every 3 hours PRN mild to moderate pain  oxyCODONE    IR 15 milliGRAM(s) Oral every 3 hours PRN Severe Pain (7 - 10)  sodium chloride 0.65% Nasal 1 Spray(s) Both Nostrils every 6 hours PRN Nasal Congestion      OBJECTIVE: Patient on supplemental oxygen, lying in bed.    Sedation Score:	[ X] Alert	[ ] Drowsy 	[ ] Arousable	[ ] Asleep	[ ] Unresponsive    Side Effects:	[X ] None	[ ] Nausea	[ ] Vomiting	[ ] Pruritus  		[ ] Other:    Vital Signs Last 24 Hrs  T(C): 38.3 (02 Jan 2019 08:00), Max: 38.3 (02 Jan 2019 08:00)  T(F): 101 (02 Jan 2019 08:00), Max: 101 (02 Jan 2019 08:00)  HR: 104 (02 Jan 2019 09:00) (85 - 113)  BP: 119/88 (02 Jan 2019 09:00) (88/73 - 176/101)  BP(mean): 91 (02 Jan 2019 09:00) (47 - 113)  RR: 20 (02 Jan 2019 09:00) (13 - 25)  SpO2: 95% (02 Jan 2019 09:00) (93% - 100%)    ASSESSMENT/ PLAN    Therapy to  be:	[ ] Continue   [ X] Discontinued   [X ] Change to prn Analgesics    Documentation and Verification of current medications:   [X] Done	[ ] Not done, not elligible    Comments: PRN Oral/IV opioids and Adjuvant non-opioid medication to be ordered at this point.

## 2019-01-02 NOTE — PROGRESS NOTE ADULT - SUBJECTIVE AND OBJECTIVE BOX
Chief Complaint: DM2/chronic steroid, hypothyroid    History: Tolerating some liquids.  No hypoglycemia.  Steroid taper noted.    MEDICATIONS  (STANDING):  buDESOnide 160 MICROgram(s)/formoterol 4.5 MICROgram(s) Inhaler 2 Puff(s) Inhalation two times a day  dextrose 50% Injectable 25 milliLiter(s) IV Push once  dextrose 50% Injectable 12.5 Gram(s) IV Push once  fluticasone propionate 50 MICROgram(s)/spray Nasal Spray 1 Spray(s) Both Nostrils two times a day  gabapentin 300 milliGRAM(s) Oral three times a day  heparin  Injectable 5000 Unit(s) SubCutaneous every 8 hours  hydrocortisone sodium succinate Injectable 50 milliGRAM(s) IV Push every 12 hours  influenza   Vaccine 0.5 milliLiter(s) IntraMuscular once  insulin glargine Injectable (LANTUS) 8 Unit(s) SubCutaneous at bedtime  insulin lispro (HumaLOG) corrective regimen sliding scale   SubCutaneous three times a day before meals  insulin lispro (HumaLOG) corrective regimen sliding scale   SubCutaneous at bedtime  levothyroxine 137 MICROGram(s) Oral daily  metoprolol tartrate 25 milliGRAM(s) Oral two times a day  pantoprazole    Tablet 40 milliGRAM(s) Oral before breakfast  piperacillin/tazobactam IVPB. 3.375 Gram(s) IV Intermittent every 8 hours  simvastatin 20 milliGRAM(s) Oral at bedtime  sodium chloride 3%  Inhalation 4 milliLiter(s) Inhalation every 6 hours  tiotropium 18 MICROgram(s) Capsule 1 Capsule(s) Inhalation daily  traMADol 50 milliGRAM(s) Oral every 6 hours  vancomycin  IVPB 1000 milliGRAM(s) IV Intermittent every 12 hours  vitamin A 97825 Unit(s) Oral daily    MEDICATIONS  (PRN):  ALBUTerol    90 MICROgram(s) HFA Inhaler 1 Puff(s) Inhalation every 4 hours PRN Shortness of Breath and/or Wheezing  benzocaine 15 mG/menthol 3.6 mG (Sugar-Free) Lozenge 1 Lozenge Oral every 4 hours PRN Sore Throat  calcium carbonate    500 mG (Tums) Chewable 1 Tablet(s) Chew every 4 hours PRN Heartburn  glucagon  Injectable 1 milliGRAM(s) IntraMuscular once PRN Glucose LESS THAN 70 milligrams/deciliter  guaiFENesin  milliGRAM(s) Oral every 12 hours PRN Cough  HYDROmorphone  Injectable 0.5 milliGRAM(s) IV Push every 3 hours PRN breakthrough pain  oxyCODONE    IR 10 milliGRAM(s) Oral every 3 hours PRN mild to moderate pain  oxyCODONE    IR 15 milliGRAM(s) Oral every 3 hours PRN Severe Pain (7 - 10)  sodium chloride 0.65% Nasal 1 Spray(s) Both Nostrils every 6 hours PRN Nasal Congestion      Allergies    No Known Allergies    Intolerances      Review of Systems:    ALL OTHER SYSTEMS REVIEWED AND NEGATIVE      PHYSICAL EXAM:  VITALS: T(C): 38.4 (01-02-19 @ 12:00)  T(F): 101.2 (01-02-19 @ 12:00), Max: 101.2 (01-02-19 @ 12:00)  HR: 104 (01-02-19 @ 17:07) (85 - 120)  BP: 126/71 (01-02-19 @ 15:00) (88/73 - 176/101)  RR:  (13 - 27)  SpO2:  (92% - 98%)  Wt(kg): --  GENERAL: NAD, cushingoid appearance  EYES: No proptosis, no lid lag, anicteric  HEENT:  Atraumatic, Normocephalic, moist mucous membranes  NEURO: extraocular movements intact, no tremor  PSYCH: Alert and oriented x 3, normal affect, normal mood      CAPILLARY BLOOD GLUCOSE      POCT Blood Glucose.: 169 mg/dL (02 Jan 2019 16:29)  POCT Blood Glucose.: 222 mg/dL (02 Jan 2019 12:19)  POCT Blood Glucose.: 205 mg/dL (02 Jan 2019 08:06)  POCT Blood Glucose.: 294 mg/dL (02 Jan 2019 07:47)  POCT Blood Glucose.: 122 mg/dL (01 Jan 2019 22:19)  POCT Blood Glucose.: 206 mg/dL (01 Jan 2019 18:40)      01-02    137  |  101  |  10  ----------------------------<  188<H>  4.2   |  20<L>  |  0.94    EGFR if : 76  EGFR if non : 66    Ca    8.2<L>      01-02  Mg     2.3     01-02  Phos  2.5     01-02    TPro  5.6<L>  /  Alb  2.7<L>  /  TBili  < 0.2<L>  /  DBili  0.1  /  AST  24  /  ALT  10  /  AlkPhos  68  01-01          Thyroid Function Tests:  12-27 @ 07:15 TSH -- FreeT4 1.18 T3 -- Anti TPO -- Anti Thyroglobulin Ab -- TSI --  12-20 @ 06:45 TSH 40.15 FreeT4 0.98 T3 -- Anti TPO -- Anti Thyroglobulin Ab -- TSI --      Hemoglobin A1C, Whole Blood: 7.1 % <H> [4.0 - 5.6] (12-17-18 @ 03:50)  Hemoglobin A1C, Whole Blood: 7.3 % <H> [4.0 - 5.6] (12-16-18 @ 04:17)

## 2019-01-03 ENCOUNTER — TRANSCRIPTION ENCOUNTER (OUTPATIENT)
Age: 64
End: 2019-01-03

## 2019-01-03 LAB
B PERT DNA SPEC QL NAA+PROBE: NOT DETECTED — SIGNIFICANT CHANGE UP
BASE EXCESS BLDA CALC-SCNC: -1.3 MMOL/L — SIGNIFICANT CHANGE UP
BASE EXCESS BLDA CALC-SCNC: -1.8 MMOL/L — SIGNIFICANT CHANGE UP
BASOPHILS # BLD AUTO: 0.01 K/UL — SIGNIFICANT CHANGE UP (ref 0–0.2)
BASOPHILS NFR BLD AUTO: 0.1 % — SIGNIFICANT CHANGE UP (ref 0–2)
BLD GP AB SCN SERPL QL: NEGATIVE — SIGNIFICANT CHANGE UP
BUN SERPL-MCNC: 12 MG/DL — SIGNIFICANT CHANGE UP (ref 7–23)
C PNEUM DNA SPEC QL NAA+PROBE: NOT DETECTED — SIGNIFICANT CHANGE UP
CA-I BLDA-SCNC: 1.12 MMOL/L — LOW (ref 1.15–1.29)
CA-I BLDA-SCNC: 1.12 MMOL/L — LOW (ref 1.15–1.29)
CALCIUM SERPL-MCNC: 8 MG/DL — LOW (ref 8.4–10.5)
CHLORIDE SERPL-SCNC: 98 MMOL/L — SIGNIFICANT CHANGE UP (ref 98–107)
CO2 SERPL-SCNC: 22 MMOL/L — SIGNIFICANT CHANGE UP (ref 22–31)
CREAT SERPL-MCNC: 1.06 MG/DL — SIGNIFICANT CHANGE UP (ref 0.5–1.3)
EOSINOPHIL # BLD AUTO: 0 K/UL — SIGNIFICANT CHANGE UP (ref 0–0.5)
EOSINOPHIL NFR BLD AUTO: 0 % — SIGNIFICANT CHANGE UP (ref 0–6)
FLUAV H1 2009 PAND RNA SPEC QL NAA+PROBE: NOT DETECTED — SIGNIFICANT CHANGE UP
FLUAV H1 RNA SPEC QL NAA+PROBE: NOT DETECTED — SIGNIFICANT CHANGE UP
FLUAV H3 RNA SPEC QL NAA+PROBE: DETECTED — HIGH
FLUBV RNA SPEC QL NAA+PROBE: NOT DETECTED — SIGNIFICANT CHANGE UP
GLUCOSE BLDA-MCNC: 230 MG/DL — HIGH (ref 70–99)
GLUCOSE BLDA-MCNC: 331 MG/DL — HIGH (ref 70–99)
GLUCOSE BLDC GLUCOMTR-MCNC: 146 MG/DL — HIGH (ref 70–99)
GLUCOSE BLDC GLUCOMTR-MCNC: 187 MG/DL — HIGH (ref 70–99)
GLUCOSE BLDC GLUCOMTR-MCNC: 229 MG/DL — HIGH (ref 70–99)
GLUCOSE BLDC GLUCOMTR-MCNC: 275 MG/DL — HIGH (ref 70–99)
GLUCOSE BLDC GLUCOMTR-MCNC: 370 MG/DL — HIGH (ref 70–99)
GLUCOSE SERPL-MCNC: 225 MG/DL — HIGH (ref 70–99)
HADV DNA SPEC QL NAA+PROBE: NOT DETECTED — SIGNIFICANT CHANGE UP
HCO3 BLDA-SCNC: 22 MMOL/L — SIGNIFICANT CHANGE UP (ref 22–26)
HCO3 BLDA-SCNC: 23 MMOL/L — SIGNIFICANT CHANGE UP (ref 22–26)
HCOV PNL SPEC NAA+PROBE: SIGNIFICANT CHANGE UP
HCT VFR BLD CALC: 22 % — LOW (ref 34.5–45)
HCT VFR BLD CALC: 23.5 % — LOW (ref 34.5–45)
HCT VFR BLDA CALC: 21.8 % — LOW (ref 34.5–46.5)
HCT VFR BLDA CALC: 28.4 % — LOW (ref 34.5–46.5)
HGB BLD-MCNC: 6.6 G/DL — CRITICAL LOW (ref 11.5–15.5)
HGB BLD-MCNC: 6.9 G/DL — CRITICAL LOW (ref 11.5–15.5)
HGB BLDA-MCNC: 7 G/DL — CRITICAL LOW (ref 11.5–15.5)
HGB BLDA-MCNC: 9.2 G/DL — LOW (ref 11.5–15.5)
HMPV RNA SPEC QL NAA+PROBE: NOT DETECTED — SIGNIFICANT CHANGE UP
HPIV1 RNA SPEC QL NAA+PROBE: NOT DETECTED — SIGNIFICANT CHANGE UP
HPIV2 RNA SPEC QL NAA+PROBE: NOT DETECTED — SIGNIFICANT CHANGE UP
HPIV3 RNA SPEC QL NAA+PROBE: NOT DETECTED — SIGNIFICANT CHANGE UP
HPIV4 RNA SPEC QL NAA+PROBE: NOT DETECTED — SIGNIFICANT CHANGE UP
IMM GRANULOCYTES NFR BLD AUTO: 1.8 % — HIGH (ref 0–1.5)
LACTATE BLDA-SCNC: 0.7 MMOL/L — SIGNIFICANT CHANGE UP (ref 0.5–2)
LACTATE BLDA-SCNC: 1.9 MMOL/L — SIGNIFICANT CHANGE UP (ref 0.5–2)
LYMPHOCYTES # BLD AUTO: 1.22 K/UL — SIGNIFICANT CHANGE UP (ref 1–3.3)
LYMPHOCYTES # BLD AUTO: 11.6 % — LOW (ref 13–44)
MAGNESIUM SERPL-MCNC: 2.3 MG/DL — SIGNIFICANT CHANGE UP (ref 1.6–2.6)
MCHC RBC-ENTMCNC: 29.4 % — LOW (ref 32–36)
MCHC RBC-ENTMCNC: 30 % — LOW (ref 32–36)
MCHC RBC-ENTMCNC: 30.3 PG — SIGNIFICANT CHANGE UP (ref 27–34)
MCHC RBC-ENTMCNC: 30.4 PG — SIGNIFICANT CHANGE UP (ref 27–34)
MCV RBC AUTO: 101.4 FL — HIGH (ref 80–100)
MCV RBC AUTO: 103.1 FL — HIGH (ref 80–100)
MONOCYTES # BLD AUTO: 0.35 K/UL — SIGNIFICANT CHANGE UP (ref 0–0.9)
MONOCYTES NFR BLD AUTO: 3.3 % — SIGNIFICANT CHANGE UP (ref 2–14)
NEUTROPHILS # BLD AUTO: 8.77 K/UL — HIGH (ref 1.8–7.4)
NEUTROPHILS NFR BLD AUTO: 83.2 % — HIGH (ref 43–77)
NRBC # FLD: 0.03 — SIGNIFICANT CHANGE UP
NRBC # FLD: 0.04 — SIGNIFICANT CHANGE UP
PCO2 BLDA: 51 MMHG — HIGH (ref 32–48)
PCO2 BLDA: 52 MMHG — HIGH (ref 32–48)
PH BLDA: 7.29 PH — LOW (ref 7.35–7.45)
PH BLDA: 7.29 PH — LOW (ref 7.35–7.45)
PHOSPHATE SERPL-MCNC: 3 MG/DL — SIGNIFICANT CHANGE UP (ref 2.5–4.5)
PLATELET # BLD AUTO: 372 K/UL — SIGNIFICANT CHANGE UP (ref 150–400)
PLATELET # BLD AUTO: 412 K/UL — HIGH (ref 150–400)
PMV BLD: 8.8 FL — SIGNIFICANT CHANGE UP (ref 7–13)
PMV BLD: 9 FL — SIGNIFICANT CHANGE UP (ref 7–13)
PO2 BLDA: 56 MMHG — LOW (ref 83–108)
PO2 BLDA: 79 MMHG — LOW (ref 83–108)
POTASSIUM BLDA-SCNC: 4.1 MMOL/L — SIGNIFICANT CHANGE UP (ref 3.4–4.5)
POTASSIUM BLDA-SCNC: 4.2 MMOL/L — SIGNIFICANT CHANGE UP (ref 3.4–4.5)
POTASSIUM SERPL-MCNC: 4.2 MMOL/L — SIGNIFICANT CHANGE UP (ref 3.5–5.3)
POTASSIUM SERPL-SCNC: 4.2 MMOL/L — SIGNIFICANT CHANGE UP (ref 3.5–5.3)
RBC # BLD: 2.17 M/UL — LOW (ref 3.8–5.2)
RBC # BLD: 2.28 M/UL — LOW (ref 3.8–5.2)
RBC # FLD: 15.4 % — HIGH (ref 10.3–14.5)
RBC # FLD: 15.6 % — HIGH (ref 10.3–14.5)
RH IG SCN BLD-IMP: POSITIVE — SIGNIFICANT CHANGE UP
RSV RNA SPEC QL NAA+PROBE: NOT DETECTED — SIGNIFICANT CHANGE UP
RV+EV RNA SPEC QL NAA+PROBE: NOT DETECTED — SIGNIFICANT CHANGE UP
SAO2 % BLDA: 85.6 % — LOW (ref 95–99)
SAO2 % BLDA: 93.8 % — LOW (ref 95–99)
SODIUM BLDA-SCNC: 133 MMOL/L — LOW (ref 136–146)
SODIUM BLDA-SCNC: 134 MMOL/L — LOW (ref 136–146)
SODIUM SERPL-SCNC: 135 MMOL/L — SIGNIFICANT CHANGE UP (ref 135–145)
VANCOMYCIN TROUGH SERPL-MCNC: 19.6 UG/ML — SIGNIFICANT CHANGE UP (ref 10–20)
WBC # BLD: 10.54 K/UL — HIGH (ref 3.8–10.5)
WBC # BLD: 9.13 K/UL — SIGNIFICANT CHANGE UP (ref 3.8–10.5)
WBC # FLD AUTO: 10.54 K/UL — HIGH (ref 3.8–10.5)
WBC # FLD AUTO: 9.13 K/UL — SIGNIFICANT CHANGE UP (ref 3.8–10.5)

## 2019-01-03 PROCEDURE — 99291 CRITICAL CARE FIRST HOUR: CPT

## 2019-01-03 PROCEDURE — 71045 X-RAY EXAM CHEST 1 VIEW: CPT | Mod: 26

## 2019-01-03 PROCEDURE — 99223 1ST HOSP IP/OBS HIGH 75: CPT

## 2019-01-03 PROCEDURE — 99232 SBSQ HOSP IP/OBS MODERATE 35: CPT

## 2019-01-03 RX ORDER — INSULIN GLARGINE 100 [IU]/ML
10 INJECTION, SOLUTION SUBCUTANEOUS AT BEDTIME
Qty: 0 | Refills: 0 | Status: DISCONTINUED | OUTPATIENT
Start: 2019-01-03 | End: 2019-01-07

## 2019-01-03 RX ORDER — ACETAMINOPHEN 500 MG
1000 TABLET ORAL ONCE
Qty: 0 | Refills: 0 | Status: COMPLETED | OUTPATIENT
Start: 2019-01-03 | End: 2019-01-03

## 2019-01-03 RX ADMIN — GABAPENTIN 300 MILLIGRAM(S): 400 CAPSULE ORAL at 21:15

## 2019-01-03 RX ADMIN — Medication 10000 UNIT(S): at 11:23

## 2019-01-03 RX ADMIN — OXYCODONE HYDROCHLORIDE 15 MILLIGRAM(S): 5 TABLET ORAL at 01:39

## 2019-01-03 RX ADMIN — Medication 250 MILLIGRAM(S): at 10:50

## 2019-01-03 RX ADMIN — HYDROMORPHONE HYDROCHLORIDE 0.5 MILLIGRAM(S): 2 INJECTION INTRAMUSCULAR; INTRAVENOUS; SUBCUTANEOUS at 21:30

## 2019-01-03 RX ADMIN — BUDESONIDE AND FORMOTEROL FUMARATE DIHYDRATE 2 PUFF(S): 160; 4.5 AEROSOL RESPIRATORY (INHALATION) at 09:25

## 2019-01-03 RX ADMIN — Medication 137 MICROGRAM(S): at 21:15

## 2019-01-03 RX ADMIN — TRAMADOL HYDROCHLORIDE 50 MILLIGRAM(S): 50 TABLET ORAL at 23:30

## 2019-01-03 RX ADMIN — TRAMADOL HYDROCHLORIDE 50 MILLIGRAM(S): 50 TABLET ORAL at 00:39

## 2019-01-03 RX ADMIN — SIMVASTATIN 20 MILLIGRAM(S): 20 TABLET, FILM COATED ORAL at 21:15

## 2019-01-03 RX ADMIN — Medication 4: at 16:59

## 2019-01-03 RX ADMIN — SODIUM CHLORIDE 4 MILLILITER(S): 9 INJECTION INTRAMUSCULAR; INTRAVENOUS; SUBCUTANEOUS at 23:40

## 2019-01-03 RX ADMIN — HEPARIN SODIUM 5000 UNIT(S): 5000 INJECTION INTRAVENOUS; SUBCUTANEOUS at 21:16

## 2019-01-03 RX ADMIN — Medication 25 MILLIGRAM(S): at 17:47

## 2019-01-03 RX ADMIN — OXYCODONE HYDROCHLORIDE 15 MILLIGRAM(S): 5 TABLET ORAL at 19:47

## 2019-01-03 RX ADMIN — TRAMADOL HYDROCHLORIDE 50 MILLIGRAM(S): 50 TABLET ORAL at 17:47

## 2019-01-03 RX ADMIN — HYDROMORPHONE HYDROCHLORIDE 0.5 MILLIGRAM(S): 2 INJECTION INTRAMUSCULAR; INTRAVENOUS; SUBCUTANEOUS at 21:09

## 2019-01-03 RX ADMIN — PIPERACILLIN AND TAZOBACTAM 25 GRAM(S): 4; .5 INJECTION, POWDER, LYOPHILIZED, FOR SOLUTION INTRAVENOUS at 05:01

## 2019-01-03 RX ADMIN — TRAMADOL HYDROCHLORIDE 50 MILLIGRAM(S): 50 TABLET ORAL at 23:49

## 2019-01-03 RX ADMIN — Medication 400 MILLIGRAM(S): at 17:40

## 2019-01-03 RX ADMIN — HEPARIN SODIUM 5000 UNIT(S): 5000 INJECTION INTRAVENOUS; SUBCUTANEOUS at 14:55

## 2019-01-03 RX ADMIN — SODIUM CHLORIDE 4 MILLILITER(S): 9 INJECTION INTRAMUSCULAR; INTRAVENOUS; SUBCUTANEOUS at 04:05

## 2019-01-03 RX ADMIN — Medication 1 SPRAY(S): at 05:01

## 2019-01-03 RX ADMIN — TRAMADOL HYDROCHLORIDE 50 MILLIGRAM(S): 50 TABLET ORAL at 12:45

## 2019-01-03 RX ADMIN — OXYCODONE HYDROCHLORIDE 15 MILLIGRAM(S): 5 TABLET ORAL at 15:10

## 2019-01-03 RX ADMIN — OXYCODONE HYDROCHLORIDE 15 MILLIGRAM(S): 5 TABLET ORAL at 08:32

## 2019-01-03 RX ADMIN — HYDROMORPHONE HYDROCHLORIDE 0.5 MILLIGRAM(S): 2 INJECTION INTRAMUSCULAR; INTRAVENOUS; SUBCUTANEOUS at 12:46

## 2019-01-03 RX ADMIN — OXYCODONE HYDROCHLORIDE 15 MILLIGRAM(S): 5 TABLET ORAL at 08:47

## 2019-01-03 RX ADMIN — TIOTROPIUM BROMIDE 1 CAPSULE(S): 18 CAPSULE ORAL; RESPIRATORY (INHALATION) at 09:27

## 2019-01-03 RX ADMIN — Medication 6: at 08:02

## 2019-01-03 RX ADMIN — TRAMADOL HYDROCHLORIDE 50 MILLIGRAM(S): 50 TABLET ORAL at 05:25

## 2019-01-03 RX ADMIN — Medication 250 MILLIGRAM(S): at 23:01

## 2019-01-03 RX ADMIN — GABAPENTIN 300 MILLIGRAM(S): 400 CAPSULE ORAL at 05:00

## 2019-01-03 RX ADMIN — Medication 600 MILLIGRAM(S): at 09:05

## 2019-01-03 RX ADMIN — BENZOCAINE AND MENTHOL 1 LOZENGE: 5; 1 LIQUID ORAL at 09:05

## 2019-01-03 RX ADMIN — TRAMADOL HYDROCHLORIDE 50 MILLIGRAM(S): 50 TABLET ORAL at 01:09

## 2019-01-03 RX ADMIN — OXYCODONE HYDROCHLORIDE 15 MILLIGRAM(S): 5 TABLET ORAL at 20:46

## 2019-01-03 RX ADMIN — OXYCODONE HYDROCHLORIDE 15 MILLIGRAM(S): 5 TABLET ORAL at 02:09

## 2019-01-03 RX ADMIN — HYDROMORPHONE HYDROCHLORIDE 0.5 MILLIGRAM(S): 2 INJECTION INTRAMUSCULAR; INTRAVENOUS; SUBCUTANEOUS at 16:58

## 2019-01-03 RX ADMIN — INSULIN GLARGINE 10 UNIT(S): 100 INJECTION, SOLUTION SUBCUTANEOUS at 23:35

## 2019-01-03 RX ADMIN — BUDESONIDE AND FORMOTEROL FUMARATE DIHYDRATE 2 PUFF(S): 160; 4.5 AEROSOL RESPIRATORY (INHALATION) at 23:30

## 2019-01-03 RX ADMIN — Medication 10 MILLIGRAM(S): at 05:00

## 2019-01-03 RX ADMIN — PIPERACILLIN AND TAZOBACTAM 25 GRAM(S): 4; .5 INJECTION, POWDER, LYOPHILIZED, FOR SOLUTION INTRAVENOUS at 14:03

## 2019-01-03 RX ADMIN — HYDROMORPHONE HYDROCHLORIDE 0.5 MILLIGRAM(S): 2 INJECTION INTRAMUSCULAR; INTRAVENOUS; SUBCUTANEOUS at 17:15

## 2019-01-03 RX ADMIN — ALBUTEROL 1 PUFF(S): 90 AEROSOL, METERED ORAL at 01:58

## 2019-01-03 RX ADMIN — PANTOPRAZOLE SODIUM 40 MILLIGRAM(S): 20 TABLET, DELAYED RELEASE ORAL at 06:00

## 2019-01-03 RX ADMIN — Medication 10: at 11:46

## 2019-01-03 RX ADMIN — Medication 1: at 23:36

## 2019-01-03 RX ADMIN — TRAMADOL HYDROCHLORIDE 50 MILLIGRAM(S): 50 TABLET ORAL at 11:23

## 2019-01-03 RX ADMIN — Medication 1 SPRAY(S): at 17:46

## 2019-01-03 RX ADMIN — GABAPENTIN 300 MILLIGRAM(S): 400 CAPSULE ORAL at 14:55

## 2019-01-03 RX ADMIN — TRAMADOL HYDROCHLORIDE 50 MILLIGRAM(S): 50 TABLET ORAL at 05:00

## 2019-01-03 RX ADMIN — OXYCODONE HYDROCHLORIDE 15 MILLIGRAM(S): 5 TABLET ORAL at 14:55

## 2019-01-03 RX ADMIN — HYDROMORPHONE HYDROCHLORIDE 0.5 MILLIGRAM(S): 2 INJECTION INTRAMUSCULAR; INTRAVENOUS; SUBCUTANEOUS at 13:01

## 2019-01-03 RX ADMIN — Medication 50 MILLIGRAM(S): at 05:01

## 2019-01-03 NOTE — DISCHARGE NOTE ADULT - CARE PROVIDERS DIRECT ADDRESSES
,[email or web address]

## 2019-01-03 NOTE — DISCHARGE NOTE ADULT - PATIENT PORTAL LINK FT
You can access the [facility] Patient Portal, offered by [facility], by registering with the following website: [email or web address]

## 2019-01-03 NOTE — PROGRESS NOTE ADULT - SUBJECTIVE AND OBJECTIVE BOX
Contact info:   Sid Saul MD  pager [phone], please provide 10 digit call back number.   Please note that this patient may be followed by another provider tomorrow.   If no answer or after hours, please contact [phone]    Subjective: Patient has no acute event overnight.  Is pending transfer to RCU from SICU.   She was placed earlier on a regular diet and supplements with high Carb content.  Her sliding scale has been changed from low does sliding scale to moderate does.  She received 8 units an 10 units of Humalog with breakfast and dinner.  Her diet has been changed since.     MEDICATIONS  (STANDING):  buDESOnide 160 MICROgram(s)/formoterol 4.5 MICROgram(s) Inhaler 2 Puff(s) Inhalation two times a day  dextrose 50% Injectable 25 milliLiter(s) IV Push once  fluticasone propionate 50 MICROgram(s)/spray Nasal Spray 1 Spray(s) Both Nostrils two times a day  gabapentin 300 milliGRAM(s) Oral three times a day  heparin  Injectable 5000 Unit(s) SubCutaneous every 8 hours  influenza   Vaccine 0.5 milliLiter(s) IntraMuscular once  insulin glargine Injectable (LANTUS) 10 Unit(s) SubCutaneous at bedtime  insulin lispro (HumaLOG) corrective regimen sliding scale   SubCutaneous three times a day before meals  insulin lispro (HumaLOG) corrective regimen sliding scale   SubCutaneous at bedtime  levothyroxine 137 MICROGram(s) Oral daily  metoprolol tartrate 25 milliGRAM(s) Oral two times a day  pantoprazole    Tablet 40 milliGRAM(s) Oral before breakfast  piperacillin/tazobactam IVPB. 3.375 Gram(s) IV Intermittent every 8 hours  predniSONE   Tablet 10 milliGRAM(s) Oral daily  simvastatin 20 milliGRAM(s) Oral at bedtime  sodium chloride 3%  Inhalation 4 milliLiter(s) Inhalation every 6 hours  tiotropium 18 MICROgram(s) Capsule 1 Capsule(s) Inhalation daily  traMADol 50 milliGRAM(s) Oral every 6 hours  vancomycin  IVPB 1000 milliGRAM(s) IV Intermittent every 12 hours    MEDICATIONS  (PRN):  ALBUTerol    90 MICROgram(s) HFA Inhaler 1 Puff(s) Inhalation every 4 hours PRN Shortness of Breath and/or Wheezing  benzocaine 15 mG/menthol 3.6 mG (Sugar-Free) Lozenge 1 Lozenge Oral every 4 hours PRN Sore Throat  calcium carbonate    500 mG (Tums) Chewable 1 Tablet(s) Chew every 4 hours PRN Heartburn  guaiFENesin  milliGRAM(s) Oral every 12 hours PRN Cough  HYDROmorphone  Injectable 0.5 milliGRAM(s) IV Push every 3 hours PRN breakthrough pain  oxyCODONE    IR 10 milliGRAM(s) Oral every 3 hours PRN mild to moderate pain  oxyCODONE    IR 15 milliGRAM(s) Oral every 3 hours PRN Severe Pain (7 - 10)  sodium chloride 0.65% Nasal 1 Spray(s) Both Nostrils every 6 hours PRN Nasal Congestion      Allergies    No Known Allergies    Review of Systems:  Constitutional: No fever  Eyes: No blurry vision  Neuro: No tremors  HEENT: No pain  Cardiovascular: No chest pain, palpitations  Respiratory: No SOB, no cough  GI: No nausea, vomiting, abdominal pain  : No dysuria  Skin: no rash  Psych: no depression  Endocrine: no polyuria, polydipsia    ALL OTHER SYSTEMS REVIEWED AND NEGATIVE    PHYSICAL EXAM:  VITALS: T(C): 37.6 (01-03-19 @ 12:00)  T(F): 99.7 (01-03-19 @ 12:00), Max: 100.8 (01-02-19 @ 20:00)  HR: 105 (01-03-19 @ 13:00) (78 - 115)  BP: 107/81 (01-03-19 @ 12:46) (95/59 - 135/86)  RR:  (12 - 27)  SpO2:  (89% - 97%)  Wt(kg): --  GENERAL: NAD, cushingoid appearance  EYES: No proptosis, no lid lag, anicteric  HEENT:  Atraumatic, Normocephalic, moist mucous membranes  NEURO: extraocular movements intact, no tremor  PSYCH: Alert and oriented x 3, normal affect, normal mood    POCT Blood Glucose.: 370 mg/dL (01-03-19 @ 11:28)  POCT Blood Glucose.: 275 mg/dL (01-03-19 @ 07:58)  POCT Blood Glucose.: 229 mg/dL (01-02-19 @ 21:20)  POCT Blood Glucose.: 169 mg/dL (01-02-19 @ 16:29)  POCT Blood Glucose.: 222 mg/dL (01-02-19 @ 12:19)  POCT Blood Glucose.: 205 mg/dL (01-02-19 @ 08:06)  POCT Blood Glucose.: 294 mg/dL (01-02-19 @ 07:47)  POCT Blood Glucose.: 122 mg/dL (01-01-19 @ 22:19)  POCT Blood Glucose.: 206 mg/dL (01-01-19 @ 18:40)  POCT Blood Glucose.: 231 mg/dL (01-01-19 @ 12:54)  POCT Blood Glucose.: 166 mg/dL (01-01-19 @ 06:08)  POCT Blood Glucose.: 169 mg/dL (12-31-18 @ 21:49)  POCT Blood Glucose.: 131 mg/dL (12-31-18 @ 18:49)      01-03    135  |  98  |  12  ----------------------------<  225<H>  4.2   |  22  |  1.06    EGFR if : 66  EGFR if non : 57    Ca    8.0<L>      01-03  Mg     2.3     01-03  Phos  3.0     01-03    TPro  5.6<L>  /  Alb  2.7<L>  /  TBili  < 0.2<L>  /  DBili  0.1  /  AST  24  /  ALT  10  /  AlkPhos  68  01-01        Thyroid Function Tests:  12-27 @ 07:15 TSH -- FreeT4 1.18 T3 -- Anti TPO -- Anti Thyroglobulin Ab -- TSI --  12-20 @ 06:45 TSH 40.15 FreeT4 0.98 T3 -- Anti TPO -- Anti Thyroglobulin Ab -- TSI --      Hemoglobin A1C, Whole Blood: 7.1 % <H> [4.0 - 5.6] (12-17-18 @ 03:50)  Hemoglobin A1C, Whole Blood: 7.3 % <H> [4.0 - 5.6] (12-16-18 @ 04:17)

## 2019-01-03 NOTE — DISCHARGE NOTE ADULT - PLAN OF CARE
Completed treatment. resolved SBO s/p ex-lap with findings of healthy bowel c/b evisceration of bowel requiring placement of retention sutures, now removed.  Continue dressing to wound change 2x day. Required recurrent intubations - now successfully extubated.   Nasal cannula day, BIPAP HS - settings 21/10/40%  Chest PT 4x daily, suction PRN.  Please follow up with pulmonary within 1-2 weeks upon discharge from rehab. a1c 7.3  Continue insulin as directed.  Please follow up with endocrinology upon discharge from rehab. Continue prednisone.  Pain medications as needed. Completed treatment.  Supportive care. Completed IV antibiotics for enterococcal bacteremia.  Cultures cleared. SBO s/p ex-lap with findings of healthy bowel c/b evisceration of bowel requiring placement of retention sutures, now removed.  Continue dressing to wound, change 2x day. Required recurrent intubations - now extubated.   Nasal cannula day, BIPAP HS - settings 21/10/21%  Chest PT 4x daily, suction PRN.  Please follow up with pulmonary within 1-2 weeks upon discharge from rehab. Seen by neurology - primidone added.  Please follow up with neurology upon discharge from rehab - [facility] [place], [address], [address], [place] [address], Phone: [phone] a1c 7.3  Continue insulin as directed.  Please follow up with endocrinology upon discharge from rehab.  For home discharge (per endo) - Tresiba 30 units sq QHS, Januvia 50 mg PO QD and prandin 1 mg PO TID AC

## 2019-01-03 NOTE — PROGRESS NOTE ADULT - ASSESSMENT
63y Female w hx COPD on 3L O2 at home, moderate pulm HTN (as shown on echo from 2017), CHRIS (Untreated), DM, RA on chronic prednisone, HTN, CKD, CVA, who presented this admission on 12/15 c/o epigastric pain, n/v w CT evidence suggesting SBO. Was admitted to surg and had ex-lap 12/16 w SHANNON but no transition point found; abdomen was closed w retention sutures. Pt was then kept briefly in SICU due to intentional delayed extubation given pulm comorbidities. Went to floor 12/18 where has been doing well since.   Today 12/30 pt was coughing and had sudden evisceration of bowels. Went to OR for another ex-lap w findings of intact bowel; abdomen was closed w 6 additional retention sutures.   SICU consulted as pt was kept extubated post-op due to concerns re pulm comorbidities. In the OR, pt was given 1500 cc crystalloid, had 300 cc uop; was given pushes of senia initially, and had temp 38.3 treated w tylenol (has known UTI). No pressor requirements at end of case.  SICU course complicated by multifocal pneumonia. continued on vanc, zosyn; bipap at nights, pulmozyme, metaneb given    #neuro  - pain ctrl prn with opioids    #resp  - hx COPD and pulm HTN, current smoker  - c/w Proventil, Symbicort, Spiriva  - continue mucinex  - Sputum culture    #cv  - off pressors post-op  - HR 90s, bp 140s/70s  - hx HTN  - also takes lasix at home, held recently per pulm recs given pt w/o current signs pulm edema  - increase to metoprolol 7.5 q6  - monitor uop, lactate    #GI  - s/p ex-lap  for evisceration, retention sutures placed  - await GI function  - c/w protonix daily  - Advance to soft diet with ensure    #renal  - hx CKD  - BUN/Crt wnl  - monitor I's&O's  - repletions PRN  - d/c iraheta, TOV  - Restart lasix later today  - D5 1/2NS @ 75cc/h    #heme  - H/H stable during admission prior to OR  - sqh for dvt ppx    #ID  - trend fevers  - f/u UA  - Started vanc/zosyn    #endo  - hx RA on chronic prednisone, DM  - continue solu-cortef  - vitamin A to assist wound healing  - c/w lantus 10, ISS  - continue synthroid, endo following    #dispo  -SICU      Critical Care Dx:  - Respiratory failure  - CHRIS  - Morbid obesity  - Evisceration  - Severe protein/calorie malnutrition  - Pulmonary HTN  - COPD  - Adrenal insufficiency 63y Female w hx COPD on 3L O2 at home, moderate pulm HTN (as shown on echo from 2017), CHRIS (Untreated), DM, RA on chronic prednisone, HTN, CKD, CVA, who presented this admission on 12/15 c/o epigastric pain, n/v w CT evidence suggesting SBO. Was admitted to surg and had ex-lap 12/16 w SHANNON but no transition point found; abdomen was closed w retention sutures. Pt was then kept briefly in SICU due to intentional delayed extubation given pulm comorbidities. Went to floor 12/18 where has been doing well since.   Today 12/30 pt was coughing and had sudden evisceration of bowels. Went to OR for another ex-lap w findings of intact bowel; abdomen was closed w 6 additional retention sutures.   SICU consulted as pt was kept extubated post-op due to concerns re pulm comorbidities. In the OR, pt was given 1500 cc crystalloid, had 300 cc uop; was given pushes of senia initially, and had temp 38.3 treated w tylenol (has known UTI). No pressor requirements at end of case.  SICU course complicated by multifocal pneumonia. continued on vanc, zosyn; bipap at nights, pulmozyme, metaneb given    #neuro  - pain ctrl prn with opioids    #resp  - hx COPD and pulm HTN, current smoker  - c/w Proventil, Symbicort, Spiriva  - continue mucinex  - Sputum culture    #cv  - off pressors post-op  - HR 90s, bp 140s/70s  - hx HTN  - also takes lasix at home, held recently per pulm recs given pt w/o current signs pulm edema  - increase to metoprolol 7.5 q6  - monitor uop, lactate    #GI  - s/p ex-lap  for evisceration, retention sutures placed  - await GI function  - c/w protonix daily  - Advance to soft diet with ensure    #renal  - hx CKD  - BUN/Crt wnl  - monitor I's&O's  - repletions PRN  - d/c iraheta, TOV  - Restart lasix later today  - D5 1/2NS @ 75cc/h    #heme  - hgb 6.6/22.0, downtrending, no clinical signs of bleeding, received 1u PRBC, will cont to trend today  - sqh for dvt ppx    #ID  - trend fevers  - f/u UA  - Started vanc/zosyn    #endo  - hx RA on chronic prednisone, DM  - continue solu-cortef  - vitamin A to assist wound healing  - c/w lantus 10, ISS  - continue synthroid, endo following    #dispo  -SICU      Critical Care Dx:  - Respiratory failure  - CHRIS  - Morbid obesity  - Evisceration  - Severe protein/calorie malnutrition  - Pulmonary HTN  - COPD  - Adrenal insufficiency 63y Female w hx COPD on 3L O2 at home, moderate pulm HTN (as shown on echo from 2017), CHRIS (Untreated), DM, RA on chronic prednisone, HTN, CKD, CVA, who presented this admission on 12/15 c/o epigastric pain, n/v w CT evidence suggesting SBO. Was admitted to surg and had ex-lap 12/16 w SHANNON but no transition point found; abdomen was closed w retention sutures. Pt was then kept briefly in SICU due to intentional delayed extubation given pulm comorbidities. Went to floor 12/18 where has been doing well since.   Today 12/30 pt was coughing and had sudden evisceration of bowels. Went to OR for another ex-lap w findings of intact bowel; abdomen was closed w 6 additional retention sutures.   SICU consulted as pt was kept extubated post-op due to concerns re pulm comorbidities. In the OR, pt was given 1500 cc crystalloid, had 300 cc uop; was given pushes of senia initially, and had temp 38.3 treated w tylenol (has known UTI). No pressor requirements at end of case.  SICU course complicated by multifocal pneumonia. continued on vanc, zosyn; bipap at nights, pulmozyme, metaneb given    #neuro  - pain ctrl prn with opioids    #resp  - hx COPD and pulm HTN, current smoker  - c/w Proventil, Symbicort, Spiriva  - continue mucinex  - Sputum culture pending   - will repeat ABG to check pCO2, and trend ETCO2    #cv  - off pressors post-op  - HR 90s, bp 140s/70s  - hx HTN  - also takes lasix at home, held recently per pulm recs given pt w/o current signs pulm edema  - increase to metoprolol 7.5 q6  - monitor uop, lactate    #GI  - s/p ex-lap  for evisceration, retention sutures placed  - await GI function  - c/w protonix daily  - Advance to soft diet with ensure    #renal  - hx CKD  - BUN/Crt wnl  - monitor I's&O's  - repletions PRN  - d/c iraheta, TOV  - Restart lasix later today  - D5 1/2NS @ 75cc/h    #heme  - hgb 6.6/22.0, downtrending, no clinical signs of bleeding, received 1u PRBC, will cont to trend today  - sqh for dvt ppx    #ID  - trend fevers  - f/u UA  - Started vanc/zosyn    #endo  - hx RA on chronic prednisone, DM  - continue solu-cortef  - vitamin A to assist wound healing  - c/w lantus 10, ISS  - continue synthroid, endo following    #dispo  -SICU      Critical Care Dx:  - Respiratory failure  - CHRIS  - Morbid obesity  - Evisceration  - Severe protein/calorie malnutrition  - Pulmonary HTN  - COPD  - Adrenal insufficiency

## 2019-01-03 NOTE — DISCHARGE NOTE ADULT - HOSPITAL COURSE
63y Female w hx COPD on 3L O2 at home, moderate pulm HTN, CHRIS (has home cpap but does not use regularly), DMT2, RA on chronic prednisone, HTN, CKD who presented on 12/15 c/o epigastric pain, n/v w CT evidence suggesting SBO. She had ex-lap on 12/16 with SHANNON but no transition point found; abdomen was closed with 4 retention sutures. Pt was then kept briefly in SICU due to intentional delayed extubation given pulm comorbidities.  On 12/30 pt was coughing and had evisceration of bowels, Went to OR for another ex-lap w findings of intact, healthy bowel; was closed w 6 additional retention sutures, transferred to SICU as pt was intentionally kept extubated post-op due to concerns re pulm comorbidities. Pt was extubated next day and BiPAP started, pt did not tolerate due to increasing frequency and severity of cough. Bedside ultrasound showed multifocal consolidations b/l on 1/1, CT chest and abdomen with contrast repeated on 1/2 for abdominal and pulmonary pathology, which showed LLL and lingular consolidations s/o infectious pathology. sputum studies sent, RVP sent, abx vanco + zosyn continued to cover for MRSA and pseudomonas, started on metaneb and pulmozyme. Pulmonary consulted as pt has been following with Dr Lisker as outpt for CAP vs HAP vs VAP and transferred to RCU for further management. 63y Female w hx COPD on 3L O2 at home, moderate pulm HTN, CHRIS (has home cpap but does not use regularly), DMT2, RA on chronic prednisone, HTN, CKD who presented on 12/15 c/o epigastric pain, n/v w CT evidence suggesting SBO. She had ex-lap on 12/16 with SHANNON but no transition point found; abdomen was closed with 4 retention sutures. Pt was then kept briefly in SICU due to intentional delayed extubation given pulm comorbidities.  On 12/30 pt was coughing and had evisceration of bowels, Went to OR for another ex-lap w findings of intact, healthy bowel; was closed w 6 additional retention sutures, transferred to SICU as pt was intentionally kept extubated post-op due to concerns re pulm comorbidities. Pt was extubated next day and BiPAP started, pt did not tolerate due to increasing frequency and severity of cough. Bedside ultrasound showed multifocal consolidations b/l on 1/1, CT chest and abdomen with contrast repeated on 1/2 for abdominal and pulmonary pathology, which showed LLL and lingular consolidations s/o infectious pathology. sputum studies sent, RVP sent, abx vanco + zosyn continued to cover for MRSA and pseudomonas, started on metaneb and pulmozyme. Pulmonary consulted as pt has been following with Dr Lisker as outpt for CAP vs HAP vs VAP and transferred to RCU for further management. Pt placed on bipap on RCU. Consulted SICU for worsening resp status despite continuous bipap 16/8 with fevers. +flu with pneumonia. 1/6 pt was intubated for worsening respiratory status 63y Female w hx COPD on 3L O2 at home, moderate pulm HTN, CHRIS (has home cpap but does not use regularly), DMT2, RA on chronic prednisone, HTN, CKD who presented on 12/15 c/o epigastric pain, n/v w CT evidence suggesting SBO. She had ex-lap on 12/16 with SHANNON but no transition point found; abdomen was closed with 4 retention sutures. Pt was then kept briefly in SICU due to intentional delayed extubation given pulm comorbidities.  On 12/30 pt was coughing and had evisceration of bowels, Went to OR for another ex-lap w findings of intact, healthy bowel; was closed w 6 additional retention sutures, transferred to SICU as pt was intentionally kept extubated post-op due to concerns re pulm comorbidities. Pt was extubated next day and BiPAP started, pt did not tolerate due to increasing frequency and severity of cough. Bedside ultrasound showed multifocal consolidations b/l on 1/1, CT chest and abdomen with contrast repeated on 1/2 for abdominal and pulmonary pathology, which showed LLL and lingular consolidations s/o infectious pathology. sputum studies sent, RVP sent, abx vanco + zosyn continued to cover for MRSA and pseudomonas, started on metaneb and pulmozyme. Pulmonary consulted as pt has been following with Dr Lisker as outpt for CAP vs HAP vs VAP and transferred to RCU for further management. Pt placed on bipap on RCU. Consulted SICU for worsening resp status despite continuous bipap 16/8 with fevers. +flu with pneumonia. 1/6 pt was intubated for worsening respiratory status. Pt required pressor support for which central line placed.  Attempted to wean down Fio2 and PEEP requirements. Pt failed multiple spontaneous breathing trials. Diuresed pt to see if help wean pt from vent but unsuccessful.  1/9 pt underwent bronchoscopy. No major amount of secretions noted, bronchi open, not much mucus. No plugging. No tracheal irritation seen. Pt required insulin drip for persistently elevated FS >300 which was later weaned and DM meds adjusted. 1/11 pt febrile for which fever work up performed. 1/12 pt self extubated and was re-intubated by anesthesia. CT scan performed to evaluate for intraabdominal source of fever and showed Ventral midline diastasis and open wound without evidence for discrete abscess or drainable collection. Multifocal pneumonia, with increased consolidation in the right middle and left lower lobes since 1/2/2018. ID consulted for +blood cx from 1/11, rec Vanc and Zosyn and reculture pt. Also rec echo to r/o vegetation.   1/13 echo performed and showed 1. Mitral annular calcification, otherwise normal mitral valve. Minimal mitral regurgitation.2. Normal left ventricular internal dimensions and wall thicknesses. 3. Endocardium not well visualized; grossly normal left ventricular systolic function. 4. Unable to accurately evaluate right ventricular size or systolic function. 5. Estimated right ventricular systolic pressure equals 51 mm Hg, assuming right atrial pressure equals 10 mm Hg, consistent with moderate pulmonary hypertension. Unable to exclude endocarditis.  Consider MATEO for further evaluation, if clinically indicated. 1/14 duplex performed to r/o DVT and showed no evidence of bilateral lower extremity deep venous thrombosis. 64 y/o female with PMH of HTN, DM, hypothyroidism, RA (10mg prednisone), pulmonary HTN, CHRIS, and COPD (3L at home) who was originally admitted on 12/15 for SBO. Underwent ex lap wit lysis of adhesions on 12/16 after which time she was transferred to the SICU for delayed extubation. Was transferred to the floors after extubation when on 12/30 she eviscerated 2/2 a coughing episode, went her second ex lap and again returned to the SICU for delayed extubation. Of note, 10 to 12 retention sutures were placed over the course of the two surgeries. Was then transferred to RCU for multifocal pneumonia where she was placed on BIPAP. There developed ARDS and returned to the SICU where she was re-intubated on 1/6. She has since failed multiple attempts at extubation and both the pt and family (Son, Rome, is healthcare proxy) are reportedly refusing trach. Antibiotics were discontinued yesterday, although ID is recommending continued therapy with vancomycin for the next two weeks due to prior blood cultures growing E faecium. The pt and her family are requesting transfer to outside hospital which was not feasible, so MICU was consulted for transfer.  63y Female hx of COPD presented initially with SBO, underwent ex lap 12/15 closed with 4 retention sutures, evicerated on 12/30 and closed with 6 more retention sutures, c/b multifocal pneumonia, developed hypercapnic respiratory failure requiring intubation 1/6, s/p bronch 1/9/18, improving and tolerating CPAP, still intubated, course further c/b Enterococci bacteremia. Transferred to MICU at request of patient/family on 1/22. Extubated to BIPAP on 1/23. Switched to high flow nasal cannula on 1/24. Dilaudid PCA pump discontinued on 1/25. Patient satting well on HF NS.   Tx to RCU on 1/25 64 y/o female with PMH of HTN, DM, hypothyroidism, RA (10mg prednisone), pulmonary HTN, CHRIS, and COPD (3L at home) who was originally admitted on 12/15 for SBO. Underwent ex lap wit lysis of adhesions on 12/16 after which time she was transferred to the SICU for delayed extubation. Was transferred to the floors after extubation when on 12/30 she eviscerated 2/2 a coughing episode, went her second ex lap and again returned to the SICU for delayed extubation. Of note, 10 to 12 retention sutures were placed over the course of the two surgeries. Was then transferred to RCU for multifocal pneumonia where she was placed on BIPAP. There developed ARDS and returned to the SICU where she was re-intubated on 1/6. She has since failed multiple attempts at extubation and both the pt and family (Son, Rome, is healthcare proxy) are reportedly refusing trach, but eventually extubated on 1/24. Of note she was flu positive on 1/3.     Her hospital course was complicated by E. Faecium bacteremia for which she has been on Vancomycin. Blood cultures cleared on 1/17; however she spiked fever on 1/29 in RCU and Zosyn was subsequently added. In RCU, patient requiring HiFlo during day and AVAPS qHS; however developed increased work of breathing and desaturated to 67%. Patient was given Lasix 80 IVP with improvement in respiratory status and transferred to MICU on BiPaP for closer monitoring.       MICU Course:  After extubation on 1/24 she was readmitted to the MICU on 1/29 for hypoxic respiratory failure 2/2 atelectasis requiring reintubation on 1/31. On 2/14 she had grown ESBL in the urine and completed course of ertapenem. She is also now off of norepinephrine. Her SC line was replaced with a TLC on 2/24. As she required pressors her lasix and metolazone were held. She was then extubated to BIPAP on 2/26 and has now transitioned to high flow nasal cannula. She was maintained on fentanyl and precedex for pain control and reduced anxiety, and was transitioned to tramadol and fentanyl patch.    Pt was tx to RCU on 2/28. Weaned from BIPAP to high flow to nasal cannula. Continued BIPAP HS and PRN.  Ethics and palliative consulted. Multiple family meetings, complicated family dynamics as children do not agree on goals of care for patient. Pt now awake and alert - states that she would not consent for trach, nor would she want one. Decision for DNR/DNI made by patient. On 3/2, pt had fever. Cultures negative, pt monitored off abx and fevers resolved. Endo following for DM2, medications adjusted.  PT recs rehab.      dispo: to rehab 62 y/o female with PMH of HTN, DM, hypothyroidism, RA (10mg prednisone), pulmonary HTN, CHRIS, and COPD (3L at home) who was originally admitted on 12/15 for SBO. Underwent ex lap wit lysis of adhesions on 12/16 after which time she was transferred to the SICU for delayed extubation. Was transferred to the floors after extubation when on 12/30 she eviscerated 2/2 a coughing episode, went her second ex lap and again returned to the SICU for delayed extubation. Of note, 10 to 12 retention sutures were placed over the course of the two surgeries. Was then transferred to RCU for multifocal pneumonia where she was placed on BIPAP. There developed ARDS and returned to the SICU where she was re-intubated on 1/6. She has since failed multiple attempts at extubation and both the pt and family (Son, Rome, is healthcare proxy) are reportedly refusing trach, but eventually extubated on 1/24. Of note she was flu positive on 1/3.     Her hospital course was complicated by E. Faecium bacteremia for which she has been on Vancomycin. Blood cultures cleared on 1/17; however she spiked fever on 1/29 in RCU and Zosyn was subsequently added. In RCU, patient requiring HiFlo during day and AVAPS qHS; however developed increased work of breathing and desaturated to 67%. Patient was given Lasix 80 IVP with improvement in respiratory status and transferred to MICU on BiPaP for closer monitoring.       MICU Course:  After extubation on 1/24 she was readmitted to the MICU on 1/29 for hypoxic respiratory failure 2/2 atelectasis requiring reintubation on 1/31. On 2/14 she had grown ESBL in the urine and completed course of ertapenem. She is also now off of norepinephrine. Her SC line was replaced with a TLC on 2/24. As she required pressors her lasix and metolazone were held. She was then extubated to BIPAP on 2/26 and has now transitioned to high flow nasal cannula. She was maintained on fentanyl and precedex for pain control and reduced anxiety, and was transitioned to tramadol and fentanyl patch.    Pt was tx to RCU on 2/28. Weaned from BIPAP to high flow to nasal cannula. Continued BIPAP HS and PRN.  Ethics and palliative consulted. Multiple family meetings, complicated family dynamics as children do not agree on goals of care for patient. Pt now awake and alert - states that she would not consent for trach, nor would she want one. Decision for DNR/DNI made by patient. On 3/2, pt had fever. Cultures negative, pt monitored off abx and fevers resolved. Endo following for DM2, medications adjusted.  Neuro seen for pts tremors - primidone added. Outpatient MRI and workup.  PT recs rehab.      dispo: to rehab

## 2019-01-03 NOTE — DISCHARGE NOTE ADULT - MEDICATION SUMMARY - MEDICATIONS TO CHANGE
I will SWITCH the dose or number of times a day I take the medications listed below when I get home from the hospital:    furosemide 40 mg oral tablet  -- 1 tab(s) by mouth once a day in the morning    traMADol 50 mg oral tablet  -- 1.5 tab(s) by mouth 3 times a day as needed for pain    furosemide 20 mg oral tablet  -- 1 tab(s) by mouth once a day in the evening    gabapentin 300 mg oral capsule  -- 1 cap(s) by mouth 2 times a day

## 2019-01-03 NOTE — DISCHARGE NOTE ADULT - MEDICATION SUMMARY - MEDICATIONS TO STOP TAKING
I will STOP taking the medications listed below when I get home from the hospital:    clopidogrel 75 mg oral tablet  -- 1 tab(s) by mouth once a day    oxybutynin 5 mg oral tablet  -- 1 tab(s) by mouth 2 times a day    amLODIPine 5 mg oral tablet  -- 1 tab(s) by mouth once a day    Toprol-XL 50 mg oral tablet, extended release  -- 1 tab(s) by mouth once a day    Tresiba FlexTouch 100 units/mL subcutaneous solution  -- 18 unit(s) subcutaneous once a day (at bedtime)   -- Check with your doctor before becoming pregnant.  Do not drink alcoholic beverages when taking this medication.  It is very important that you take or use this exactly as directed.  Do not skip doses or discontinue unless directed by your doctor.  Keep in refrigerator.  Do not freeze.  May cause drowsiness.  Alcohol may intensify this effect.  Use care when operating dangerous machinery.  Obtain medical advice before taking any non-prescription drugs as some may affect the action of this medication.    SITagliptin 50 mg oral tablet  -- 1 tab(s) by mouth once a day   -- Do not drink alcoholic beverages when taking this medication.    senna oral tablet  -- 2 tab(s) by mouth once a day (at bedtime)    docusate sodium 100 mg oral capsule  -- 1 cap(s) by mouth 3 times a day    polyethylene glycol 3350 oral powder for reconstitution  -- 17 gram(s) by mouth once a day    sulfaSALAzine 500 mg oral tablet  -- 1 tab(s) by mouth 2 times a day    fluticasone 50 mcg/inh nasal spray  -- 1 spray(s) into nose 2 times a day for 1 week    guaiFENesin 600 mg oral tablet, extended release  -- 1 tab(s) by mouth every 12 hours for 7 days

## 2019-01-03 NOTE — PROGRESS NOTE ADULT - PROBLEM SELECTOR PLAN 1
-Currently on home regimen and prednisone 10mg daily   -PO status still somewhat uncertain, will continue with moderate does sliding scale.  Her diet has been now changed to Carb consistent.   - Would increase Lantus to 10 units at bedtime.     - d/c likely on prandin, sitagliptin and likely lower dose tresiba pending FSG trends

## 2019-01-03 NOTE — PROGRESS NOTE ADULT - SUBJECTIVE AND OBJECTIVE BOX
SUBJECTIVE: Pt seen and examined bedside in SICU this morning. Patient had biPAP mask on, with O2 sat of 94%. CT chest AP yesterday confirmed multifocal PNA. Patient had fevers to 100.8, tylenol given. Hgb 6.9, transfused 1U of PRBC last night with good response. Feeling well this morning. Denies N/V, and chills.    General Appearance: Appears well, NAD  Neck: Supple  Chest: Equal expansion bilaterally, equal breath sounds  CV: Pulse regular presently  Abdomen: Moderately distended, appropriate incisional tenderness, dressings clean and dry and intact  Extremities: Grossly symmetric, SCD's in place     Vital Signs Last 24 Hrs  T(C): 37.2 (03 Jan 2019 08:00), Max: 38.4 (02 Jan 2019 12:00)  T(F): 98.9 (03 Jan 2019 08:00), Max: 101.2 (02 Jan 2019 12:00)  HR: 105 (03 Jan 2019 11:00) (78 - 116)  BP: 123/45 (03 Jan 2019 11:00) (95/59 - 135/86)  BP(mean): 64 (03 Jan 2019 11:00) (57 - 97)  RR: 16 (03 Jan 2019 11:00) (12 - 27)  SpO2: 95% (03 Jan 2019 11:00) (89% - 97%)    I&O's Summary    02 Jan 2019 07:01  -  03 Jan 2019 07:00  --------------------------------------------------------  IN: 1435 mL / OUT: 950 mL / NET: 485 mL    03 Jan 2019 07:01  -  03 Jan 2019 11:16  --------------------------------------------------------  IN: 0 mL / OUT: 225 mL / NET: -225 mL      I&O's Detail    02 Jan 2019 07:01  -  03 Jan 2019 07:00  --------------------------------------------------------  IN:    dextrose 5% + sodium chloride 0.45%.: 225 mL    IV PiggyBack: 550 mL    Oral Fluid: 310 mL    Packed Red Blood Cells: 350 mL  Total IN: 1435 mL    OUT:    Indwelling Catheter - Urethral: 950 mL  Total OUT: 950 mL    Total NET: 485 mL      03 Jan 2019 07:01  -  03 Jan 2019 11:16  --------------------------------------------------------  IN:  Total IN: 0 mL    OUT:    Indwelling Catheter - Urethral: 225 mL  Total OUT: 225 mL    Total NET: -225 mL          MEDICATIONS  (STANDING):  buDESOnide 160 MICROgram(s)/formoterol 4.5 MICROgram(s) Inhaler 2 Puff(s) Inhalation two times a day  dextrose 50% Injectable 25 milliLiter(s) IV Push once  fluticasone propionate 50 MICROgram(s)/spray Nasal Spray 1 Spray(s) Both Nostrils two times a day  gabapentin 300 milliGRAM(s) Oral three times a day  heparin  Injectable 5000 Unit(s) SubCutaneous every 8 hours  influenza   Vaccine 0.5 milliLiter(s) IntraMuscular once  insulin glargine Injectable (LANTUS) 10 Unit(s) SubCutaneous at bedtime  insulin lispro (HumaLOG) corrective regimen sliding scale   SubCutaneous three times a day before meals  insulin lispro (HumaLOG) corrective regimen sliding scale   SubCutaneous at bedtime  levothyroxine 137 MICROGram(s) Oral daily  metoprolol tartrate 25 milliGRAM(s) Oral two times a day  pantoprazole    Tablet 40 milliGRAM(s) Oral before breakfast  piperacillin/tazobactam IVPB. 3.375 Gram(s) IV Intermittent every 8 hours  predniSONE   Tablet 10 milliGRAM(s) Oral daily  simvastatin 20 milliGRAM(s) Oral at bedtime  sodium chloride 3%  Inhalation 4 milliLiter(s) Inhalation every 6 hours  tiotropium 18 MICROgram(s) Capsule 1 Capsule(s) Inhalation daily  traMADol 50 milliGRAM(s) Oral every 6 hours  vancomycin  IVPB 1000 milliGRAM(s) IV Intermittent every 12 hours  vitamin A 33726 Unit(s) Oral daily    MEDICATIONS  (PRN):  ALBUTerol    90 MICROgram(s) HFA Inhaler 1 Puff(s) Inhalation every 4 hours PRN Shortness of Breath and/or Wheezing  benzocaine 15 mG/menthol 3.6 mG (Sugar-Free) Lozenge 1 Lozenge Oral every 4 hours PRN Sore Throat  calcium carbonate    500 mG (Tums) Chewable 1 Tablet(s) Chew every 4 hours PRN Heartburn  guaiFENesin  milliGRAM(s) Oral every 12 hours PRN Cough  HYDROmorphone  Injectable 0.5 milliGRAM(s) IV Push every 3 hours PRN breakthrough pain  oxyCODONE    IR 10 milliGRAM(s) Oral every 3 hours PRN mild to moderate pain  oxyCODONE    IR 15 milliGRAM(s) Oral every 3 hours PRN Severe Pain (7 - 10)  sodium chloride 0.65% Nasal 1 Spray(s) Both Nostrils every 6 hours PRN Nasal Congestion      LABS:                        6.6    9.13  )-----------( 372      ( 03 Jan 2019 03:25 )             22.0     01-03    135  |  98  |  12  ----------------------------<  225<H>  4.2   |  22  |  1.06    Ca    8.0<L>      03 Jan 2019 02:20  Phos  3.0     01-03  Mg     2.3     01-03            RADIOLOGY & ADDITIONAL STUDIES:

## 2019-01-03 NOTE — PROGRESS NOTE ADULT - ASSESSMENT
63F w/ multiple respiratory comorbidities p/w SBO s/p ex-lap with findings of healthy bowel c/b evisceration of bowel s/p ex-lap with placement of retention sutures.    - DVT ppx  - Cont abx for multifocal pna   - F/U blood cx  - monitor GI fxn   - Possible bronchoscopy  - care per SICU team

## 2019-01-03 NOTE — DISCHARGE NOTE ADULT - SECONDARY DIAGNOSIS.
Acute on chronic respiratory failure Type 2 diabetes mellitus with hypoglycemia, with long-term current use of insulin RA (Rheumatoid Arthritis) Influenza Bacteremia Tremors of nervous system

## 2019-01-03 NOTE — DISCHARGE NOTE ADULT - CARE PLAN
Principal Discharge DX:	SBO (small bowel obstruction)  Secondary Diagnosis:	Acute on chronic respiratory failure  Secondary Diagnosis:	Type 2 diabetes mellitus with hypoglycemia, with long-term current use of insulin  Secondary Diagnosis:	RA (Rheumatoid Arthritis)  Secondary Diagnosis:	Influenza  Assessment and plan of treatment:	Completed treatment.  Secondary Diagnosis:	Bacteremia Principal Discharge DX:	SBO (small bowel obstruction)  Goal:	resolved  Assessment and plan of treatment:	SBO s/p ex-lap with findings of healthy bowel c/b evisceration of bowel requiring placement of retention sutures, now removed.  Continue dressing to wound change 2x day.  Secondary Diagnosis:	Acute on chronic respiratory failure  Assessment and plan of treatment:	Required recurrent intubations - now successfully extubated.   Nasal cannula day, BIPAP HS - settings 21/10/40%  Chest PT 4x daily, suction PRN.  Please follow up with pulmonary within 1-2 weeks upon discharge from rehab.  Secondary Diagnosis:	Type 2 diabetes mellitus with hypoglycemia, with long-term current use of insulin  Assessment and plan of treatment:	a1c 7.3  Continue insulin as directed.  Please follow up with endocrinology upon discharge from rehab.  Secondary Diagnosis:	RA (Rheumatoid Arthritis)  Assessment and plan of treatment:	Continue prednisone.  Pain medications as needed.  Secondary Diagnosis:	Influenza  Assessment and plan of treatment:	Completed treatment.  Supportive care.  Secondary Diagnosis:	Bacteremia  Assessment and plan of treatment:	Completed IV antibiotics for enterococcal bacteremia.  Cultures cleared. Principal Discharge DX:	SBO (small bowel obstruction)  Goal:	resolved  Assessment and plan of treatment:	SBO s/p ex-lap with findings of healthy bowel c/b evisceration of bowel requiring placement of retention sutures, now removed.  Continue dressing to wound, change 2x day.  Secondary Diagnosis:	Acute on chronic respiratory failure  Assessment and plan of treatment:	Required recurrent intubations - now extubated.   Nasal cannula day, BIPAP HS - settings 21/10/21%  Chest PT 4x daily, suction PRN.  Please follow up with pulmonary within 1-2 weeks upon discharge from rehab.  Secondary Diagnosis:	Type 2 diabetes mellitus with hypoglycemia, with long-term current use of insulin  Assessment and plan of treatment:	a1c 7.3  Continue insulin as directed.  Please follow up with endocrinology upon discharge from rehab.  Secondary Diagnosis:	RA (Rheumatoid Arthritis)  Assessment and plan of treatment:	Continue prednisone.  Pain medications as needed.  Secondary Diagnosis:	Influenza  Assessment and plan of treatment:	Completed treatment.  Supportive care.  Secondary Diagnosis:	Bacteremia  Assessment and plan of treatment:	Completed IV antibiotics for enterococcal bacteremia.  Cultures cleared. Principal Discharge DX:	SBO (small bowel obstruction)  Goal:	resolved  Assessment and plan of treatment:	SBO s/p ex-lap with findings of healthy bowel c/b evisceration of bowel requiring placement of retention sutures, now removed.  Continue dressing to wound, change 2x day.  Secondary Diagnosis:	Acute on chronic respiratory failure  Assessment and plan of treatment:	Required recurrent intubations - now extubated.   Nasal cannula day, BIPAP HS - settings 21/10/21%  Chest PT 4x daily, suction PRN.  Please follow up with pulmonary within 1-2 weeks upon discharge from rehab.  Secondary Diagnosis:	Type 2 diabetes mellitus with hypoglycemia, with long-term current use of insulin  Assessment and plan of treatment:	a1c 7.3  Continue insulin as directed.  Please follow up with endocrinology upon discharge from rehab.  Secondary Diagnosis:	RA (Rheumatoid Arthritis)  Assessment and plan of treatment:	Continue prednisone.  Pain medications as needed.  Secondary Diagnosis:	Influenza  Assessment and plan of treatment:	Completed treatment.  Supportive care.  Secondary Diagnosis:	Bacteremia  Assessment and plan of treatment:	Completed IV antibiotics for enterococcal bacteremia.  Cultures cleared.  Secondary Diagnosis:	Tremors of nervous system  Assessment and plan of treatment:	Seen by neurology - primidone added.  Please follow up with neurology upon discharge from rehab - [facility], [address] [place] [address], [address], [place] [address], Phone: [phone] Principal Discharge DX:	SBO (small bowel obstruction)  Goal:	resolved  Assessment and plan of treatment:	SBO s/p ex-lap with findings of healthy bowel c/b evisceration of bowel requiring placement of retention sutures, now removed.  Continue dressing to wound, change 2x day.  Secondary Diagnosis:	Acute on chronic respiratory failure  Assessment and plan of treatment:	Required recurrent intubations - now extubated.   Nasal cannula day, BIPAP HS - settings 21/10/21%  Chest PT 4x daily, suction PRN.  Please follow up with pulmonary within 1-2 weeks upon discharge from rehab.  Secondary Diagnosis:	Type 2 diabetes mellitus with hypoglycemia, with long-term current use of insulin  Assessment and plan of treatment:	a1c 7.3  Continue insulin as directed.  Please follow up with endocrinology upon discharge from rehab.  For home discharge (per endo) - Tresiba 30 units sq QHS, Januvia 50 mg PO QD and prandin 1 mg PO TID AC  Secondary Diagnosis:	RA (Rheumatoid Arthritis)  Assessment and plan of treatment:	Continue prednisone.  Pain medications as needed.  Secondary Diagnosis:	Influenza  Assessment and plan of treatment:	Completed treatment.  Supportive care.  Secondary Diagnosis:	Bacteremia  Assessment and plan of treatment:	Completed IV antibiotics for enterococcal bacteremia.  Cultures cleared.  Secondary Diagnosis:	Tremors of nervous system  Assessment and plan of treatment:	Seen by neurology - primidone added.  Please follow up with neurology upon discharge from rehab - [facility], [address] [place] [address], [address], [place] [address], Phone: [phone]

## 2019-01-03 NOTE — PROGRESS NOTE ADULT - SUBJECTIVE AND OBJECTIVE BOX
HISTORY  63y Female w hx COPD on 3L O2 at home, moderate pulm HTN (as shown on echo from 2017), CHRIS (Untreated), DM, RA on chronic prednisone, HTN, CKD, CVA, who presented this admission on 12/15 c/o epigastric pain, n/v w CT evidence suggesting SBO. Was admitted to surg and had ex-lap 12/16 w SHANNON but no transition point found; abdomen was closed w retention sutures. Pt was then kept briefly in SICU due to intentional delayed extubation given pulm comorbidities. Went to floor 12/18 where has been doing well since.   Today 12/30 pt was coughing and had sudden evisceration of bowels. Went to OR for another ex-lap w findings of intact bowel; abdomen was closed w 6 additional retention sutures.   SICU consulted as pt was kept extubated post-op due to concerns re pulm comorbidities. In the OR, pt was given 1500 cc crystalloid, had 300 cc uop; was given pushes of senia initially, and had temp 38.3 treated w tylenol (has known UTI). No pressor requirements at end of case.  SICU course complicated by multifocal pneumonia. continued on vanc, zosyn; bipap at nights, pulmozyme, metaneb given    24 HOUR EVENTS: Fevers to 100.8, tylenol given. Hgb 6.9, transfused 1U. Pt on Bipap overnight.    SUBJECTIVE/ROS:  [x] A ten-point review of systems was otherwise negative except as noted.  [ ] Due to altered mental status/intubation, subjective information were not able to be obtained from the patient. History was obtained, to the extent possible, from review of the chart and collateral sources of information.    NEURO  Exam: AOx4  Meds: gabapentin 300 milliGRAM(s) Oral three times a day  HYDROmorphone  Injectable 0.5 milliGRAM(s) IV Push every 3 hours PRN breakthrough pain  oxyCODONE    IR 10 milliGRAM(s) Oral every 3 hours PRN mild to moderate pain  oxyCODONE    IR 15 milliGRAM(s) Oral every 3 hours PRN Severe Pain (7 - 10)  traMADol 50 milliGRAM(s) Oral every 6 hours  [x] Adequacy of sedation and pain control has been assessed and adjusted    RESPIRATORY  RR: 13 (01-03-19 @ 04:01) (13 - 27)  SpO2: 97% (01-03-19 @ 04:05) (89% - 97%)  Exam: wheezes/crackes on L lung fields  Mechanical Ventilation:   ABG - ( 03 Jan 2019 03:25 )  pH: 7.29  /  pCO2: 52    /  pO2: 79    / HCO3: 23    / Base Excess: -1.3  /  SaO2: 93.8    Lactate: 0.7    Meds: ALBUTerol    90 MICROgram(s) HFA Inhaler 1 Puff(s) Inhalation every 4 hours PRN Shortness of Breath and/or Wheezing  buDESOnide 160 MICROgram(s)/formoterol 4.5 MICROgram(s) Inhaler 2 Puff(s) Inhalation two times a day  guaiFENesin  milliGRAM(s) Oral every 12 hours PRN Cough  sodium chloride 3%  Inhalation 4 milliLiter(s) Inhalation every 6 hours  tiotropium 18 MICROgram(s) Capsule 1 Capsule(s) Inhalation daily    CARDIOVASCULAR  HR: 95 (01-03-19 @ 04:05) (81 - 120)  BP: 110/67 (01-03-19 @ 04:01) (95/58 - 143/87)  BP(mean): 79 (01-03-19 @ 04:01) (57 - 93)  Exam: RRR  Perfusion     [x]Adequate   [ ]Inadequate  Mentation   [x]Normal       [ ]Reduced  Extremities  [x]Warm         [ ]Cool  Meds: metoprolol tartrate 25 milliGRAM(s) Oral two times a day    GI/NUTRITION  Exam: S/NT/ND  Diet: soft diet  Meds: calcium carbonate    500 mG (Tums) Chewable 1 Tablet(s) Chew every 4 hours PRN Heartburn  pantoprazole    Tablet 40 milliGRAM(s) Oral before breakfast    GENITOURINARY  I&O's Detail  01-01 @ 07:01  -  01-02 @ 07:00  --------------------------------------------------------  IN:    dextrose 5% + sodium chloride 0.45%.: 1800 mL    IV PiggyBack: 200 mL  Total IN: 2000 mL    OUT:    Indwelling Catheter - Urethral: 905 mL  Total OUT: 905 mL  Total NET: 1095 mL    01-02 @ 07:01  -  01-03 @ 04:12  --------------------------------------------------------  IN:    dextrose 5% + sodium chloride 0.45%.: 225 mL    IV PiggyBack: 450 mL    Oral Fluid: 260 mL  Total IN: 935 mL    OUT:    Indwelling Catheter - Urethral: 855 mL  Total OUT: 855 mL  Total NET: 80 mL    01-03    135  |  98  |  12  ----------------------------<  225<H>  4.2   |  22  |  1.06    Ca    8.0<L>      03 Jan 2019 02:20  Phos  3.0     01-03  Mg     2.3     01-03  [ ] Antoine catheter, indication: N/A  Meds: vitamin A 82934 Unit(s) Oral daily    HEMATOLOGIC  Meds: heparin  Injectable 5000 Unit(s) SubCutaneous every 8 hours  [x] VTE Prophylaxis                        6.6    9.13  )-----------( 372      ( 03 Jan 2019 03:25 )             22.0       Transfusion     [ ] PRBC   [ ] Platelets   [ ] FFP   [ ] Cryoprecipitate    INFECTIOUS DISEASES  T(C): 36.7 (01-03-19 @ 04:01), Max: 38.4 (01-02-19 @ 12:00)  WBC Count: 9.13 K/uL (01-03 @ 03:25)  WBC Count: 10.54 K/uL (01-03 @ 02:20)  WBC Count: 14.01 K/uL (01-02 @ 08:20)    Recent Cultures:  Specimen Source: SPUTUM, 01-02 @ 13:11; Results --; Gram Stain: --; Organism: --  Specimen Source: BLOOD, 01-01 @ 08:47; Results --; Gram Stain: --; Organism: --  Specimen Source: BLOOD, 12-31 @ 20:13; Results --; Gram Stain: --; Organism: --  Meds: influenza   Vaccine 0.5 milliLiter(s) IntraMuscular once  piperacillin/tazobactam IVPB. 3.375 Gram(s) IV Intermittent every 8 hours  vancomycin  IVPB 1000 milliGRAM(s) IV Intermittent every 12 hours    ENDOCRINE  Capillary Blood Glucose  Meds: dextrose 50% Injectable 25 milliLiter(s) IV Push once  dextrose 50% Injectable 12.5 Gram(s) IV Push once  glucagon  Injectable 1 milliGRAM(s) IntraMuscular once PRN  hydrocortisone sodium succinate Injectable 50 milliGRAM(s) IV Push every 12 hours  insulin glargine Injectable (LANTUS) 8 Unit(s) SubCutaneous at bedtime  insulin lispro (HumaLOG) corrective regimen sliding scale   SubCutaneous three times a day before meals  insulin lispro (HumaLOG) corrective regimen sliding scale   SubCutaneous at bedtime  levothyroxine 137 MICROGram(s) Oral daily  predniSONE   Tablet 10 milliGRAM(s) Oral daily  simvastatin 20 milliGRAM(s) Oral at bedtime    ACCESS DEVICES:  [x] Peripheral IV  [ ] Central Venous Line	[ ] R	[ ] L	[ ] IJ	[ ] Fem	[ ] SC	Placed:   [ ] Arterial Line		[ ] R	[ ] L	[ ] Fem	[ ] Rad	[ ] Ax	Placed:   [ ] PICC:					[ ] Mediport  [ ] Urinary Catheter, Date Placed:   [ ] Necessity of urinary, arterial, and venous catheters discussed    OTHER MEDICATIONS:  benzocaine 15 mG/menthol 3.6 mG (Sugar-Free) Lozenge 1 Lozenge Oral every 4 hours PRN  fluticasone propionate 50 MICROgram(s)/spray Nasal Spray 1 Spray(s) Both Nostrils two times a day  sodium chloride 0.65% Nasal 1 Spray(s) Both Nostrils every 6 hours PRN    CODE STATUS: full code

## 2019-01-03 NOTE — PROGRESS NOTE ADULT - ATTENDING COMMENTS
63y Female w hx COPD on 3L O2 at home, moderate pulm HTN (as shown on echo from 2017), CHRIS (Untreated), DM, RA on chronic prednisone, HTN, CKD, CVA, who presented this admission on 12/15 c/o epigastric pain, n/v w CT evidence suggesting SBO. Was admitted to surg and had ex-lap 12/16 w SHANNON but no transition point found; abdomen was closed w retention sutures. Pt was then kept briefly in SICU due to intentional delayed extubation given pulm comorbidities. Went to floor 12/18 where has been doing well since.   Today 12/30 pt was coughing and had sudden evisceration of bowels. Went to OR for another ex-lap w findings of intact bowel; abdomen was closed w 6 additional retention sutures.   SICU consulted as pt was kept extubated post-op due to concerns re pulm comorbidities. In the OR, pt was given 1500 cc crystalloid, had 300 cc uop; was given pushes of senia initially, and had temp 38.3 treated w tylenol (has known UTI). No pressor requirements at end of case.  SICU course complicated by multifocal pneumonia. continued on vanc, zosyn; bipap at nights, pulmozyme, metaneb given    #neuro  - pain ctrl prn with opioids    #resp  - hx COPD and pulm HTN, current smoker  - c/w Proventil, Symbicort, Spiriva  - continue mucinex  - Sputum culture pending   - will repeat ABG to check pCO2, and trend ETCO2    #cv  - off pressors post-op  - HR 90s, bp 140s/70s  - hx HTN  - also takes lasix at home, held recently per pulm recs given pt w/o current signs pulm edema  - increase to metoprolol 7.5 q6  - monitor uop, lactate    #GI  - s/p ex-lap  for evisceration, retention sutures placed  - await GI function  - c/w protonix daily  - Advance to soft diet with ensure    #renal  - hx CKD  - BUN/Crt wnl  - monitor I's&O's  - repletions PRN  - d/c iraheta, TOV  - Restart lasix later today  - D5 1/2NS @ 75cc/h    #heme  - hgb 6.6/22.0, downtrending, no clinical signs of bleeding, received 1u PRBC, will cont to trend today  - sqh for dvt ppx    #ID  - trend fevers  - f/u UA  - Started vanc/zosyn    #endo  - hx RA on chronic prednisone, DM  - continue solu-cortef  - vitamin A to assist wound healing  - c/w lantus 10, ISS  - continue synthroid, endo following    #dispo  -SICU      Critical Care Dx:  - Respiratory failure  - CHRIS  - Morbid obesity  - Evisceration  - Severe protein/calorie malnutrition  - Pulmonary HTN  - COPD  - Adrenal insufficiency  The patient is a critical care patient with life threatening hemodynamic and metabolic instability in SICU.  I have personally interviewed when possible and examined the patient, reviewed data and laboratory tests/x-rays and all pertinent electronic images.  I was physically present for the key portions of the evaluation and management (E/M) service provided.   The SICU team has a constant risk benefit analyzes discussion with the primary team, all consultants, House Staff and PA's on all decisions.  These diagnoses are unrelated to the surgical procedure noted above.  I meet with family if needed to get further history, discuss the case and make care decisions for this patient who might not be able to participate.  Time involved in performance of separately billable procedures was not counted toward my critical care time. There is no overlap.  I spent 55-75 minutes of critical care time for the diagnoses, assessment, plan and interventions.

## 2019-01-03 NOTE — DISCHARGE NOTE ADULT - PROVIDER TOKENS
PROVIDER:[[id number]]],PROVIDER:[TOKEN:[[id number]]] PROVIDER:[TOKEN:[[id number]]],PROVIDER:[TOKEN:[[id number]]],PROVIDER:[TOKEN:[[id number]]] PROVIDER:[TOKEN:[[id number]]],PROVIDER:[TOKEN:[[id number]]],PROVIDER:[TOKEN:[[id number]]],PROVIDER:[TOKEN:[[id number]]]

## 2019-01-03 NOTE — DISCHARGE NOTE ADULT - INSTRUCTIONS
Wound Care  Midline abdominal wound: Cleanse with NS, pat dry. Apply Liquid barrier film to periwound skin. Apply Collagenase (nickel thickness) application, cover with Restore (gel-impregnated gauze), cover with dry gauze, and abdominal pad and secure with tegaderm. Change daily.    Intertriginous folds: Place Interdry textile sheeting, remove to wash & dry affected area, then replace. Individual sheeting may be used for up to 5 days unless soiled.     Continue low air loss bed therapy, continue heel elevation, continue to turn & reposition q2h,  continue moisture management with barrier creams & single breathable pad, continue measures to decrease friction/shear/pressure. Soft diet with thin liquids  Consistent carbohydrates  Low salt, low fat  Glucerna, 1 can 2x daily

## 2019-01-03 NOTE — CHART NOTE - NSCHARTNOTEFT_GEN_A_CORE
AFTAB BASS is a 63y Female w hx COPD on 3L O2 at home, moderate pulm HTN, CHRIS (has home cpap but does not use regularly), DMT2, RA on chronic prednisone, HTN, CKD who presented on 12/15 c/o epigastric pain, n/v w CT evidence suggesting SBO. She had ex-lap on 12/16 with SHANNON but no transition point found; abdomen was closed with 4 retention sutures. Pt was then kept briefly in SICU due to intentional delayed extubation given pulm comorbidities.    On 12/30 pt was coughing and had evisceration of bowels, Went to OR for another ex-lap w findings of intact, healthy bowel; was closed w 6 additional retention sutures, transferred to SICU as pt was intentionally kept extubated post-op due to concerns re pulm comorbidities. Pt was extubated next day and BiPAP started, pt did not tolerate due to increasing frequency and severity of cough. Bedside ultrasound showed multifocal consolidations b/l on 1/1, CT chest and abdomen with contrast repeated on 1/2 for abdominal and pulmonary pathology, which showed LLL and lingular consolidations s/o infectious pathology. sputum studies sent, RVP sent, abx vanco + zosyn continued to cover for MRSA and pseudomonas, started on metaneb and pulmozyme. Pulmonary consulted as pt has been following with Dr Lisker as outpt for CAP vs HAP vs VAP and transferred to RCU for further management.     Surgically pt is stable, skin closed, intact retention sutures, tolerating carbohydrate consistent diet. Surgery continues to follow.     pt received 1 unit PRBC on 1/3 for Hb 6.6, repeat CBC pending.   peripheral IV access secured prior transfer. AFTAB BASS is a 63y Female w hx COPD on 3L O2 at home, moderate pulm HTN, CHRIS (has home cpap but does not use regularly), DMT2, RA on chronic prednisone, HTN, CKD who presented on 12/15 c/o epigastric pain, n/v w CT evidence suggesting SBO. She had ex-lap on 12/16 with SHANNON but no transition point found; abdomen was closed with 4 retention sutures. Pt was then kept briefly in SICU due to intentional delayed extubation given pulm comorbidities.    On 12/30 pt was coughing and had evisceration of bowels, Went to OR for another ex-lap w findings of intact, healthy bowel; was closed w 6 additional retention sutures, transferred to SICU as pt was intentionally kept extubated post-op due to concerns re pulm comorbidities. Pt was extubated next day and BiPAP started, pt did not tolerate due to increasing frequency and severity of cough. Bedside ultrasound showed multifocal consolidations b/l on 1/1, CT chest and abdomen with contrast repeated on 1/2 for abdominal and pulmonary pathology, which showed LLL and lingular consolidations s/o infectious pathology. sputum studies sent, RVP sent, abx vanco + zosyn continued to cover for MRSA and pseudomonas, started on metaneb and pulmozyme. Pulmonary consulted as pt has been following with Dr Lisker as outpt for CAP vs HAP vs VAP and transferred to RCU for further management.     Surgically pt is stable, skin closed, intact retention sutures, tolerating carbohydrate consistent diet. Surgery continues to follow.     pt received 1 unit PRBC on 1/3 for Hb 6.6, repeat CBC pending.   peripheral IV access secured prior transfer.  Agree above.

## 2019-01-03 NOTE — CHART NOTE - NSCHARTNOTEFT_GEN_A_CORE
NUTRITION FOLLOW-UP:    Chart reviewed for nutrition f/u.  Unable to see pt at this time, as SICU team w/pt performing procedure.  Spoke w/RN - FS have been elevated - diet ordered as Soft w/Ensure Enlive 3x/d.  Pt also on prednisone which may influence FS as well.  Spoke w/ team regarding change of diet and supplement to one more appropriate in control of hyperglycemia.  Noted 4.9kg wt increase since admission - likely fluid related.  Pt w/2+generalized and 3+ UE edema.      Weight:  1/3 - 118.9kg     12/29 - 114kg     Adm - 114.6kg     IBW - 47.7kg  Labs:  H/H 6.6/22.0  pCO2 52  Glu 225  T.Ca 8.0  -294    Current Diet:  Consistent Carbohydrate Soft w/Glucerna Shake 240ml 3x/d (220kcal w/10gm protein/240ml).      RD to Remain Available:  yes    Additional Recommendations:   1) Monitor weights, labs, BM's, skin integrity, p.o. intake, FS  2) Encourage and assist pt w/meals

## 2019-01-04 DIAGNOSIS — J11.1 INFLUENZA DUE TO UNIDENTIFIED INFLUENZA VIRUS WITH OTHER RESPIRATORY MANIFESTATIONS: ICD-10-CM

## 2019-01-04 DIAGNOSIS — M06.9 RHEUMATOID ARTHRITIS, UNSPECIFIED: ICD-10-CM

## 2019-01-04 DIAGNOSIS — Z29.9 ENCOUNTER FOR PROPHYLACTIC MEASURES, UNSPECIFIED: ICD-10-CM

## 2019-01-04 DIAGNOSIS — J96.20 ACUTE AND CHRONIC RESPIRATORY FAILURE, UNSPECIFIED WHETHER WITH HYPOXIA OR HYPERCAPNIA: ICD-10-CM

## 2019-01-04 LAB
BACTERIA SPT RESP CULT: SIGNIFICANT CHANGE UP
BASOPHILS # BLD AUTO: 0.02 K/UL — SIGNIFICANT CHANGE UP (ref 0–0.2)
BASOPHILS NFR BLD AUTO: 0.3 % — SIGNIFICANT CHANGE UP (ref 0–2)
BUN SERPL-MCNC: 16 MG/DL — SIGNIFICANT CHANGE UP (ref 7–23)
CALCIUM SERPL-MCNC: 8.3 MG/DL — LOW (ref 8.4–10.5)
CHLORIDE SERPL-SCNC: 102 MMOL/L — SIGNIFICANT CHANGE UP (ref 98–107)
CO2 SERPL-SCNC: 23 MMOL/L — SIGNIFICANT CHANGE UP (ref 22–31)
CREAT SERPL-MCNC: 1.32 MG/DL — HIGH (ref 0.5–1.3)
EOSINOPHIL # BLD AUTO: 0.01 K/UL — SIGNIFICANT CHANGE UP (ref 0–0.5)
EOSINOPHIL NFR BLD AUTO: 0.1 % — SIGNIFICANT CHANGE UP (ref 0–6)
GLUCOSE BLDC GLUCOMTR-MCNC: 157 MG/DL — HIGH (ref 70–99)
GLUCOSE BLDC GLUCOMTR-MCNC: 173 MG/DL — HIGH (ref 70–99)
GLUCOSE BLDC GLUCOMTR-MCNC: 185 MG/DL — HIGH (ref 70–99)
GLUCOSE BLDC GLUCOMTR-MCNC: 229 MG/DL — HIGH (ref 70–99)
GLUCOSE SERPL-MCNC: 185 MG/DL — HIGH (ref 70–99)
HCT VFR BLD CALC: 26.8 % — LOW (ref 34.5–45)
HGB BLD-MCNC: 8.2 G/DL — LOW (ref 11.5–15.5)
IMM GRANULOCYTES NFR BLD AUTO: 2.5 % — HIGH (ref 0–1.5)
LYMPHOCYTES # BLD AUTO: 1.62 K/UL — SIGNIFICANT CHANGE UP (ref 1–3.3)
LYMPHOCYTES # BLD AUTO: 21.6 % — SIGNIFICANT CHANGE UP (ref 13–44)
MAGNESIUM SERPL-MCNC: 2.4 MG/DL — SIGNIFICANT CHANGE UP (ref 1.6–2.6)
MCHC RBC-ENTMCNC: 29.8 PG — SIGNIFICANT CHANGE UP (ref 27–34)
MCHC RBC-ENTMCNC: 30.6 % — LOW (ref 32–36)
MCV RBC AUTO: 97.5 FL — SIGNIFICANT CHANGE UP (ref 80–100)
MONOCYTES # BLD AUTO: 0.23 K/UL — SIGNIFICANT CHANGE UP (ref 0–0.9)
MONOCYTES NFR BLD AUTO: 3.1 % — SIGNIFICANT CHANGE UP (ref 2–14)
NEUTROPHILS # BLD AUTO: 5.44 K/UL — SIGNIFICANT CHANGE UP (ref 1.8–7.4)
NEUTROPHILS NFR BLD AUTO: 72.4 % — SIGNIFICANT CHANGE UP (ref 43–77)
NRBC # FLD: 0.14 — SIGNIFICANT CHANGE UP
NRBC FLD-RTO: 1.9 — SIGNIFICANT CHANGE UP
PHOSPHATE SERPL-MCNC: 2.6 MG/DL — SIGNIFICANT CHANGE UP (ref 2.5–4.5)
PLATELET # BLD AUTO: 396 K/UL — SIGNIFICANT CHANGE UP (ref 150–400)
PMV BLD: 9.3 FL — SIGNIFICANT CHANGE UP (ref 7–13)
POTASSIUM SERPL-MCNC: 4.8 MMOL/L — SIGNIFICANT CHANGE UP (ref 3.5–5.3)
POTASSIUM SERPL-SCNC: 4.8 MMOL/L — SIGNIFICANT CHANGE UP (ref 3.5–5.3)
RBC # BLD: 2.75 M/UL — LOW (ref 3.8–5.2)
RBC # FLD: 18.3 % — HIGH (ref 10.3–14.5)
SODIUM SERPL-SCNC: 138 MMOL/L — SIGNIFICANT CHANGE UP (ref 135–145)
WBC # BLD: 7.51 K/UL — SIGNIFICANT CHANGE UP (ref 3.8–10.5)
WBC # FLD AUTO: 7.51 K/UL — SIGNIFICANT CHANGE UP (ref 3.8–10.5)

## 2019-01-04 PROCEDURE — 99233 SBSQ HOSP IP/OBS HIGH 50: CPT | Mod: GC

## 2019-01-04 PROCEDURE — 99232 SBSQ HOSP IP/OBS MODERATE 35: CPT

## 2019-01-04 RX ORDER — POLYETHYLENE GLYCOL 3350 17 G/17G
17 POWDER, FOR SOLUTION ORAL DAILY
Qty: 0 | Refills: 0 | Status: DISCONTINUED | OUTPATIENT
Start: 2019-01-04 | End: 2019-01-06

## 2019-01-04 RX ORDER — FUROSEMIDE 40 MG
40 TABLET ORAL ONCE
Qty: 0 | Refills: 0 | Status: COMPLETED | OUTPATIENT
Start: 2019-01-04 | End: 2019-01-04

## 2019-01-04 RX ORDER — SENNA PLUS 8.6 MG/1
2 TABLET ORAL AT BEDTIME
Qty: 0 | Refills: 0 | Status: DISCONTINUED | OUTPATIENT
Start: 2019-01-04 | End: 2019-01-06

## 2019-01-04 RX ORDER — DOCUSATE SODIUM 100 MG
100 CAPSULE ORAL THREE TIMES A DAY
Qty: 0 | Refills: 0 | Status: DISCONTINUED | OUTPATIENT
Start: 2019-01-04 | End: 2019-01-06

## 2019-01-04 RX ORDER — VANCOMYCIN HCL 1 G
1000 VIAL (EA) INTRAVENOUS EVERY 24 HOURS
Qty: 0 | Refills: 0 | Status: DISCONTINUED | OUTPATIENT
Start: 2019-01-04 | End: 2019-01-05

## 2019-01-04 RX ORDER — ACETAMINOPHEN 500 MG
650 TABLET ORAL EVERY 6 HOURS
Qty: 0 | Refills: 0 | Status: DISCONTINUED | OUTPATIENT
Start: 2019-01-04 | End: 2019-01-05

## 2019-01-04 RX ORDER — KETOROLAC TROMETHAMINE 30 MG/ML
15 SYRINGE (ML) INJECTION ONCE
Qty: 0 | Refills: 0 | Status: DISCONTINUED | OUTPATIENT
Start: 2019-01-04 | End: 2019-01-04

## 2019-01-04 RX ORDER — INSULIN LISPRO 100/ML
VIAL (ML) SUBCUTANEOUS
Qty: 0 | Refills: 0 | Status: DISCONTINUED | OUTPATIENT
Start: 2019-01-04 | End: 2019-01-06

## 2019-01-04 RX ORDER — IPRATROPIUM/ALBUTEROL SULFATE 18-103MCG
3 AEROSOL WITH ADAPTER (GRAM) INHALATION EVERY 6 HOURS
Qty: 0 | Refills: 0 | Status: DISCONTINUED | OUTPATIENT
Start: 2019-01-04 | End: 2019-01-06

## 2019-01-04 RX ADMIN — Medication 650 MILLIGRAM(S): at 18:55

## 2019-01-04 RX ADMIN — SODIUM CHLORIDE 4 MILLILITER(S): 9 INJECTION INTRAMUSCULAR; INTRAVENOUS; SUBCUTANEOUS at 05:23

## 2019-01-04 RX ADMIN — OXYCODONE HYDROCHLORIDE 15 MILLIGRAM(S): 5 TABLET ORAL at 20:47

## 2019-01-04 RX ADMIN — Medication 1: at 17:52

## 2019-01-04 RX ADMIN — PIPERACILLIN AND TAZOBACTAM 25 GRAM(S): 4; .5 INJECTION, POWDER, LYOPHILIZED, FOR SOLUTION INTRAVENOUS at 09:14

## 2019-01-04 RX ADMIN — TRAMADOL HYDROCHLORIDE 50 MILLIGRAM(S): 50 TABLET ORAL at 06:59

## 2019-01-04 RX ADMIN — Medication 650 MILLIGRAM(S): at 18:03

## 2019-01-04 RX ADMIN — HEPARIN SODIUM 5000 UNIT(S): 5000 INJECTION INTRAVENOUS; SUBCUTANEOUS at 06:10

## 2019-01-04 RX ADMIN — Medication 3 MILLILITER(S): at 23:10

## 2019-01-04 RX ADMIN — Medication 1 SPRAY(S): at 06:10

## 2019-01-04 RX ADMIN — PANTOPRAZOLE SODIUM 40 MILLIGRAM(S): 20 TABLET, DELAYED RELEASE ORAL at 06:09

## 2019-01-04 RX ADMIN — GABAPENTIN 300 MILLIGRAM(S): 400 CAPSULE ORAL at 13:18

## 2019-01-04 RX ADMIN — GABAPENTIN 300 MILLIGRAM(S): 400 CAPSULE ORAL at 06:09

## 2019-01-04 RX ADMIN — BUDESONIDE AND FORMOTEROL FUMARATE DIHYDRATE 2 PUFF(S): 160; 4.5 AEROSOL RESPIRATORY (INHALATION) at 23:28

## 2019-01-04 RX ADMIN — OXYCODONE HYDROCHLORIDE 15 MILLIGRAM(S): 5 TABLET ORAL at 20:17

## 2019-01-04 RX ADMIN — PIPERACILLIN AND TAZOBACTAM 25 GRAM(S): 4; .5 INJECTION, POWDER, LYOPHILIZED, FOR SOLUTION INTRAVENOUS at 17:52

## 2019-01-04 RX ADMIN — PIPERACILLIN AND TAZOBACTAM 25 GRAM(S): 4; .5 INJECTION, POWDER, LYOPHILIZED, FOR SOLUTION INTRAVENOUS at 00:58

## 2019-01-04 RX ADMIN — Medication 100 MILLIGRAM(S): at 10:08

## 2019-01-04 RX ADMIN — Medication 250 MILLIGRAM(S): at 23:29

## 2019-01-04 RX ADMIN — INSULIN GLARGINE 10 UNIT(S): 100 INJECTION, SOLUTION SUBCUTANEOUS at 22:18

## 2019-01-04 RX ADMIN — HEPARIN SODIUM 5000 UNIT(S): 5000 INJECTION INTRAVENOUS; SUBCUTANEOUS at 13:18

## 2019-01-04 RX ADMIN — TRAMADOL HYDROCHLORIDE 50 MILLIGRAM(S): 50 TABLET ORAL at 06:09

## 2019-01-04 RX ADMIN — Medication 15 MILLIGRAM(S): at 23:47

## 2019-01-04 RX ADMIN — Medication 1 SPRAY(S): at 17:53

## 2019-01-04 RX ADMIN — Medication 10 MILLIGRAM(S): at 06:09

## 2019-01-04 RX ADMIN — Medication 650 MILLIGRAM(S): at 11:00

## 2019-01-04 RX ADMIN — Medication 40 MILLIGRAM(S): at 12:31

## 2019-01-04 RX ADMIN — Medication 30 MILLIGRAM(S): at 13:18

## 2019-01-04 RX ADMIN — POLYETHYLENE GLYCOL 3350 17 GRAM(S): 17 POWDER, FOR SOLUTION ORAL at 17:54

## 2019-01-04 RX ADMIN — Medication 25 MILLIGRAM(S): at 06:09

## 2019-01-04 RX ADMIN — Medication 100 MILLIGRAM(S): at 17:53

## 2019-01-04 RX ADMIN — Medication 650 MILLIGRAM(S): at 09:13

## 2019-01-04 RX ADMIN — HYDROMORPHONE HYDROCHLORIDE 0.5 MILLIGRAM(S): 2 INJECTION INTRAMUSCULAR; INTRAVENOUS; SUBCUTANEOUS at 07:08

## 2019-01-04 RX ADMIN — Medication 2: at 09:13

## 2019-01-04 RX ADMIN — Medication 25 MILLIGRAM(S): at 18:03

## 2019-01-04 RX ADMIN — SODIUM CHLORIDE 4 MILLILITER(S): 9 INJECTION INTRAMUSCULAR; INTRAVENOUS; SUBCUTANEOUS at 23:18

## 2019-01-04 RX ADMIN — TRAMADOL HYDROCHLORIDE 50 MILLIGRAM(S): 50 TABLET ORAL at 19:00

## 2019-01-04 RX ADMIN — Medication 1: at 12:32

## 2019-01-04 RX ADMIN — HYDROMORPHONE HYDROCHLORIDE 0.5 MILLIGRAM(S): 2 INJECTION INTRAMUSCULAR; INTRAVENOUS; SUBCUTANEOUS at 07:30

## 2019-01-04 RX ADMIN — SODIUM CHLORIDE 4 MILLILITER(S): 9 INJECTION INTRAMUSCULAR; INTRAVENOUS; SUBCUTANEOUS at 11:32

## 2019-01-04 RX ADMIN — TRAMADOL HYDROCHLORIDE 50 MILLIGRAM(S): 50 TABLET ORAL at 18:04

## 2019-01-04 NOTE — PROGRESS NOTE ADULT - ASSESSMENT
63 F with PMH of COPD (on home O2 (3L), hypothyroidism, T2DM (HbA1c 8s 11/2018, on insulin), RA on prednisone (10 mg daily x 30 years), HTN,  admitted for SBO s/p ex lap with lysis of adhesions, complicated by poor glycemic control with periods of hyperglycemia and hypoglycemia on home regimen as well as inpatient with no oral intake in setting of SBO. 63 F with PMH of COPD (on home O2 (3L), hypothyroidism, T2DM (HbA1c 8s 11/2018, on insulin), RA on prednisone (10 mg daily x 30 years), HTN,  admitted for SBO s/p ex lap with lysis of adhesions, complicated by poor glycemic control with periods of hyperglycemia and hypoglycemia on home regimen as well as inpatient with no oral intake in setting of SBO, pt with minimal PO intake, sleeping for most part during the day, currently on bipap      endocrine called for DM management

## 2019-01-04 NOTE — PROGRESS NOTE ADULT - SUBJECTIVE AND OBJECTIVE BOX
CHIEF COMPLAINT: Patient is a 63y old  Female who presents with a chief complaint of SBO (03 Jan 2019 16:43)    Interval Events:      REVIEW OF SYSTEMS:  Constitutional:   Eyes:  ENT:  CV:  Resp:  GI:  :  MSK:  Integumentary:  Neurological:  Psychiatric:  Endocrine:  Hematologic/Lymphatic:  Allergic/Immunologic:  [ ] All other systems negative  [ ] Unable to assess ROS because ________      OBJECTIVE:  ICU Vital Signs Last 24 Hrs  T(C): 37.3 (04 Jan 2019 05:56), Max: 38.8 (03 Jan 2019 16:50)  T(F): 99.1 (04 Jan 2019 05:56), Max: 101.9 (03 Jan 2019 16:50)  HR: 98 (04 Jan 2019 05:56) (83 - 127)  BP: 154/75 (04 Jan 2019 05:56) (106/89 - 166/87)  BP(mean): 84 (03 Jan 2019 18:00) (64 - 106)  ABP: --  ABP(mean): --  RR: 20 (04 Jan 2019 05:56) (14 - 28)  SpO2: 92% (04 Jan 2019 05:56) (91% - 97%)    01-03 @ 07:01  -  01-04 @ 07:00  --------------------------------------------------------  IN: 1046 mL / OUT: 885 mL / NET: 161 mL    POCT Blood Glucose.: 187 mg/dL (03 Jan 2019 23:31)    HOSPITAL MEDICATIONS:  MEDICATIONS  (STANDING):  buDESOnide 160 MICROgram(s)/formoterol 4.5 MICROgram(s) Inhaler 2 Puff(s) Inhalation two times a day  dextrose 50% Injectable 25 milliLiter(s) IV Push once  docusate sodium 100 milliGRAM(s) Oral three times a day  fluticasone propionate 50 MICROgram(s)/spray Nasal Spray 1 Spray(s) Both Nostrils two times a day  gabapentin 300 milliGRAM(s) Oral three times a day  heparin  Injectable 5000 Unit(s) SubCutaneous every 8 hours  influenza   Vaccine 0.5 milliLiter(s) IntraMuscular once  insulin glargine Injectable (LANTUS) 10 Unit(s) SubCutaneous at bedtime  insulin lispro (HumaLOG) corrective regimen sliding scale   SubCutaneous three times a day before meals  insulin lispro (HumaLOG) corrective regimen sliding scale   SubCutaneous at bedtime  levothyroxine 137 MICROGram(s) Oral daily  metoprolol tartrate 25 milliGRAM(s) Oral two times a day  oseltamivir 75 milliGRAM(s) Oral two times a day  pantoprazole    Tablet 40 milliGRAM(s) Oral before breakfast  piperacillin/tazobactam IVPB. 3.375 Gram(s) IV Intermittent every 8 hours  polyethylene glycol 3350 17 Gram(s) Oral daily  predniSONE   Tablet 10 milliGRAM(s) Oral daily  senna 2 Tablet(s) Oral at bedtime  simvastatin 20 milliGRAM(s) Oral at bedtime  sodium chloride 3%  Inhalation 4 milliLiter(s) Inhalation every 6 hours  tiotropium 18 MICROgram(s) Capsule 1 Capsule(s) Inhalation daily  traMADol 50 milliGRAM(s) Oral every 6 hours    MEDICATIONS  (PRN):  ALBUTerol    90 MICROgram(s) HFA Inhaler 1 Puff(s) Inhalation every 4 hours PRN Shortness of Breath and/or Wheezing  benzocaine 15 mG/menthol 3.6 mG (Sugar-Free) Lozenge 1 Lozenge Oral every 4 hours PRN Sore Throat  calcium carbonate    500 mG (Tums) Chewable 1 Tablet(s) Chew every 4 hours PRN Heartburn  guaiFENesin  milliGRAM(s) Oral every 12 hours PRN Cough  HYDROmorphone  Injectable 0.5 milliGRAM(s) IV Push every 3 hours PRN breakthrough pain  oxyCODONE    IR 15 milliGRAM(s) Oral every 3 hours PRN Severe Pain (7 - 10)  sodium chloride 0.65% Nasal 1 Spray(s) Both Nostrils every 6 hours PRN Nasal Congestion      LABS:                        8.2    7.51  )-----------( 396      ( 04 Jan 2019 05:55 )             26.8     01-04    138  |  102  |  16  ----------------------------<  185<H>  4.8   |  23  |  1.32<H>    Ca    8.3<L>      04 Jan 2019 05:55  Phos  2.6     01-04  Mg     2.4     01-04          Arterial Blood Gas:  01-03 @ 13:08  7.29/51/56/22/85.6/-1.8  ABG lactate: 1.9  Arterial Blood Gas:  01-03 @ 03:25  7.29/52/79/23/93.8/-1.3  ABG lactate: 0.7        MICROBIOLOGY:     RADIOLOGY:  [ ] Reviewed and interpreted by me    PULMONARY FUNCTION TESTS:    EKG: CHIEF COMPLAINT: Patient is a 63y old  Female who presents with a chief complaint of SBO (03 Jan 2019 16:43)    Interval Events: tx from SICU overnight      REVIEW OF SYSTEMS:  Constitutional:   Eyes:  ENT:  CV:  Resp:  GI:  :  MSK:  Integumentary:  Neurological:  Psychiatric:  Endocrine:  Hematologic/Lymphatic:  Allergic/Immunologic:  [ ] All other systems negative  [ ] Unable to assess ROS because ________      OBJECTIVE:  ICU Vital Signs Last 24 Hrs  T(C): 37.3 (04 Jan 2019 05:56), Max: 38.8 (03 Jan 2019 16:50)  T(F): 99.1 (04 Jan 2019 05:56), Max: 101.9 (03 Jan 2019 16:50)  HR: 98 (04 Jan 2019 05:56) (83 - 127)  BP: 154/75 (04 Jan 2019 05:56) (106/89 - 166/87)  BP(mean): 84 (03 Jan 2019 18:00) (64 - 106)  ABP: --  ABP(mean): --  RR: 20 (04 Jan 2019 05:56) (14 - 28)  SpO2: 92% (04 Jan 2019 05:56) (91% - 97%)    01-03 @ 07:01  -  01-04 @ 07:00  --------------------------------------------------------  IN: 1046 mL / OUT: 885 mL / NET: 161 mL    POCT Blood Glucose.: 187 mg/dL (03 Jan 2019 23:31)    HOSPITAL MEDICATIONS:  MEDICATIONS  (STANDING):  buDESOnide 160 MICROgram(s)/formoterol 4.5 MICROgram(s) Inhaler 2 Puff(s) Inhalation two times a day  dextrose 50% Injectable 25 milliLiter(s) IV Push once  docusate sodium 100 milliGRAM(s) Oral three times a day  fluticasone propionate 50 MICROgram(s)/spray Nasal Spray 1 Spray(s) Both Nostrils two times a day  gabapentin 300 milliGRAM(s) Oral three times a day  heparin  Injectable 5000 Unit(s) SubCutaneous every 8 hours  influenza   Vaccine 0.5 milliLiter(s) IntraMuscular once  insulin glargine Injectable (LANTUS) 10 Unit(s) SubCutaneous at bedtime  insulin lispro (HumaLOG) corrective regimen sliding scale   SubCutaneous three times a day before meals  insulin lispro (HumaLOG) corrective regimen sliding scale   SubCutaneous at bedtime  levothyroxine 137 MICROGram(s) Oral daily  metoprolol tartrate 25 milliGRAM(s) Oral two times a day  oseltamivir 75 milliGRAM(s) Oral two times a day  pantoprazole    Tablet 40 milliGRAM(s) Oral before breakfast  piperacillin/tazobactam IVPB. 3.375 Gram(s) IV Intermittent every 8 hours  polyethylene glycol 3350 17 Gram(s) Oral daily  predniSONE   Tablet 10 milliGRAM(s) Oral daily  senna 2 Tablet(s) Oral at bedtime  simvastatin 20 milliGRAM(s) Oral at bedtime  sodium chloride 3%  Inhalation 4 milliLiter(s) Inhalation every 6 hours  tiotropium 18 MICROgram(s) Capsule 1 Capsule(s) Inhalation daily  traMADol 50 milliGRAM(s) Oral every 6 hours    MEDICATIONS  (PRN):  ALBUTerol    90 MICROgram(s) HFA Inhaler 1 Puff(s) Inhalation every 4 hours PRN Shortness of Breath and/or Wheezing  benzocaine 15 mG/menthol 3.6 mG (Sugar-Free) Lozenge 1 Lozenge Oral every 4 hours PRN Sore Throat  calcium carbonate    500 mG (Tums) Chewable 1 Tablet(s) Chew every 4 hours PRN Heartburn  guaiFENesin  milliGRAM(s) Oral every 12 hours PRN Cough  HYDROmorphone  Injectable 0.5 milliGRAM(s) IV Push every 3 hours PRN breakthrough pain  oxyCODONE    IR 15 milliGRAM(s) Oral every 3 hours PRN Severe Pain (7 - 10)  sodium chloride 0.65% Nasal 1 Spray(s) Both Nostrils every 6 hours PRN Nasal Congestion      LABS:                        8.2    7.51  )-----------( 396      ( 04 Jan 2019 05:55 )             26.8     01-04    138  |  102  |  16  ----------------------------<  185<H>  4.8   |  23  |  1.32<H>    Ca    8.3<L>      04 Jan 2019 05:55  Phos  2.6     01-04  Mg     2.4     01-04          Arterial Blood Gas:  01-03 @ 13:08  7.29/51/56/22/85.6/-1.8  ABG lactate: 1.9  Arterial Blood Gas:  01-03 @ 03:25  7.29/52/79/23/93.8/-1.3  ABG lactate: 0.7        MICROBIOLOGY:     RADIOLOGY:  [ ] Reviewed and interpreted by me    PULMONARY FUNCTION TESTS:    EKG: CHIEF COMPLAINT: Patient is a 63y old  Female who presents with a chief complaint of SBO (03 Jan 2019 16:43)    Interval Events: tx from SICU overnight      REVIEW OF SYSTEMS:  [ ] All other systems negative  [x] Unable to assess ROS because: lethargic      OBJECTIVE:  ICU Vital Signs Last 24 Hrs  T(C): 37.3 (04 Jan 2019 05:56), Max: 38.8 (03 Jan 2019 16:50)  T(F): 99.1 (04 Jan 2019 05:56), Max: 101.9 (03 Jan 2019 16:50)  HR: 98 (04 Jan 2019 05:56) (83 - 127)  BP: 154/75 (04 Jan 2019 05:56) (106/89 - 166/87)  BP(mean): 84 (03 Jan 2019 18:00) (64 - 106)  ABP: --  ABP(mean): --  RR: 20 (04 Jan 2019 05:56) (14 - 28)  SpO2: 92% (04 Jan 2019 05:56) (91% - 97%)    01-03 @ 07:01  -  01-04 @ 07:00  --------------------------------------------------------  IN: 1046 mL / OUT: 885 mL / NET: 161 mL    POCT Blood Glucose.: 187 mg/dL (03 Jan 2019 23:31)    HOSPITAL MEDICATIONS:  MEDICATIONS  (STANDING):  buDESOnide 160 MICROgram(s)/formoterol 4.5 MICROgram(s) Inhaler 2 Puff(s) Inhalation two times a day  dextrose 50% Injectable 25 milliLiter(s) IV Push once  docusate sodium 100 milliGRAM(s) Oral three times a day  fluticasone propionate 50 MICROgram(s)/spray Nasal Spray 1 Spray(s) Both Nostrils two times a day  gabapentin 300 milliGRAM(s) Oral three times a day  heparin  Injectable 5000 Unit(s) SubCutaneous every 8 hours  influenza   Vaccine 0.5 milliLiter(s) IntraMuscular once  insulin glargine Injectable (LANTUS) 10 Unit(s) SubCutaneous at bedtime  insulin lispro (HumaLOG) corrective regimen sliding scale   SubCutaneous three times a day before meals  insulin lispro (HumaLOG) corrective regimen sliding scale   SubCutaneous at bedtime  levothyroxine 137 MICROGram(s) Oral daily  metoprolol tartrate 25 milliGRAM(s) Oral two times a day  oseltamivir 75 milliGRAM(s) Oral two times a day  pantoprazole    Tablet 40 milliGRAM(s) Oral before breakfast  piperacillin/tazobactam IVPB. 3.375 Gram(s) IV Intermittent every 8 hours  polyethylene glycol 3350 17 Gram(s) Oral daily  predniSONE   Tablet 10 milliGRAM(s) Oral daily  senna 2 Tablet(s) Oral at bedtime  simvastatin 20 milliGRAM(s) Oral at bedtime  sodium chloride 3%  Inhalation 4 milliLiter(s) Inhalation every 6 hours  tiotropium 18 MICROgram(s) Capsule 1 Capsule(s) Inhalation daily  traMADol 50 milliGRAM(s) Oral every 6 hours    MEDICATIONS  (PRN):  ALBUTerol    90 MICROgram(s) HFA Inhaler 1 Puff(s) Inhalation every 4 hours PRN Shortness of Breath and/or Wheezing  benzocaine 15 mG/menthol 3.6 mG (Sugar-Free) Lozenge 1 Lozenge Oral every 4 hours PRN Sore Throat  calcium carbonate    500 mG (Tums) Chewable 1 Tablet(s) Chew every 4 hours PRN Heartburn  guaiFENesin  milliGRAM(s) Oral every 12 hours PRN Cough  HYDROmorphone  Injectable 0.5 milliGRAM(s) IV Push every 3 hours PRN breakthrough pain  oxyCODONE    IR 15 milliGRAM(s) Oral every 3 hours PRN Severe Pain (7 - 10)  sodium chloride 0.65% Nasal 1 Spray(s) Both Nostrils every 6 hours PRN Nasal Congestion      LABS:                        8.2    7.51  )-----------( 396      ( 04 Jan 2019 05:55 )             26.8     01-04    138  |  102  |  16  ----------------------------<  185<H>  4.8   |  23  |  1.32<H>    Ca    8.3<L>      04 Jan 2019 05:55  Phos  2.6     01-04  Mg     2.4     01-04

## 2019-01-04 NOTE — PROGRESS NOTE ADULT - SUBJECTIVE AND OBJECTIVE BOX
Surgery Progress Note    S: Patient seen and examined. No acute events overnight. Pain well controlled. Denies nausea or vomiting, tolerating regular diet. Patient on high flow NC, continues to be febrile. Passing flatus.     O:  Vital Signs Last 24 Hrs  T(C): 37.3 (04 Jan 2019 05:56), Max: 38.8 (03 Jan 2019 16:50)  T(F): 99.1 (04 Jan 2019 05:56), Max: 101.9 (03 Jan 2019 16:50)  HR: 98 (04 Jan 2019 05:56) (83 - 127)  BP: 154/75 (04 Jan 2019 05:56) (106/89 - 166/87)  BP(mean): 84 (03 Jan 2019 18:00) (64 - 106)  RR: 20 (04 Jan 2019 05:56) (14 - 28)  SpO2: 92% (04 Jan 2019 05:56) (91% - 97%)    I&O's Detail    03 Jan 2019 07:01  -  04 Jan 2019 07:00  --------------------------------------------------------  IN:    IV PiggyBack: 350 mL    Oral Fluid: 460 mL  Total IN: 810 mL    OUT:    Indwelling Catheter - Urethral: 685 mL    Voided: 200 mL  Total OUT: 885 mL    Total NET: -75 mL          MEDICATIONS  (STANDING):  buDESOnide 160 MICROgram(s)/formoterol 4.5 MICROgram(s) Inhaler 2 Puff(s) Inhalation two times a day  dextrose 50% Injectable 25 milliLiter(s) IV Push once  docusate sodium 100 milliGRAM(s) Oral three times a day  fluticasone propionate 50 MICROgram(s)/spray Nasal Spray 1 Spray(s) Both Nostrils two times a day  gabapentin 300 milliGRAM(s) Oral three times a day  heparin  Injectable 5000 Unit(s) SubCutaneous every 8 hours  influenza   Vaccine 0.5 milliLiter(s) IntraMuscular once  insulin glargine Injectable (LANTUS) 10 Unit(s) SubCutaneous at bedtime  insulin lispro (HumaLOG) corrective regimen sliding scale   SubCutaneous three times a day before meals  insulin lispro (HumaLOG) corrective regimen sliding scale   SubCutaneous at bedtime  levothyroxine 137 MICROGram(s) Oral daily  metoprolol tartrate 25 milliGRAM(s) Oral two times a day  oseltamivir 75 milliGRAM(s) Oral two times a day  pantoprazole    Tablet 40 milliGRAM(s) Oral before breakfast  piperacillin/tazobactam IVPB. 3.375 Gram(s) IV Intermittent every 8 hours  polyethylene glycol 3350 17 Gram(s) Oral daily  predniSONE   Tablet 10 milliGRAM(s) Oral daily  senna 2 Tablet(s) Oral at bedtime  simvastatin 20 milliGRAM(s) Oral at bedtime  sodium chloride 3%  Inhalation 4 milliLiter(s) Inhalation every 6 hours  tiotropium 18 MICROgram(s) Capsule 1 Capsule(s) Inhalation daily  traMADol 50 milliGRAM(s) Oral every 6 hours    MEDICATIONS  (PRN):  ALBUTerol    90 MICROgram(s) HFA Inhaler 1 Puff(s) Inhalation every 4 hours PRN Shortness of Breath and/or Wheezing  benzocaine 15 mG/menthol 3.6 mG (Sugar-Free) Lozenge 1 Lozenge Oral every 4 hours PRN Sore Throat  calcium carbonate    500 mG (Tums) Chewable 1 Tablet(s) Chew every 4 hours PRN Heartburn  guaiFENesin  milliGRAM(s) Oral every 12 hours PRN Cough  HYDROmorphone  Injectable 0.5 milliGRAM(s) IV Push every 3 hours PRN breakthrough pain  oxyCODONE    IR 15 milliGRAM(s) Oral every 3 hours PRN Severe Pain (7 - 10)  sodium chloride 0.65% Nasal 1 Spray(s) Both Nostrils every 6 hours PRN Nasal Congestion                            8.2    7.51  )-----------( 396      ( 04 Jan 2019 05:55 )             26.8       01-04    138  |  102  |  16  ----------------------------<  185<H>  4.8   |  23  |  1.32<H>    Ca    8.3<L>      04 Jan 2019 05:55  Phos  2.6     01-04  Mg     2.4     01-04        Physical Exam:  General Appearance: Appears well, NAD  Neck: Supple  Chest: Equal expansion bilaterally, equal breath sounds  CV: Pulse regular presently  Abdomen: Moderately distended, appropriate incisional tenderness, dressings clean and dry and intact  Extremities: Grossly symmetric, SCD's in place

## 2019-01-04 NOTE — PROGRESS NOTE ADULT - SUBJECTIVE AND OBJECTIVE BOX
Chief Complaint/Follow-up on:   DM    Subjective:  POC blood glucose and insulin use reviewed.  FSBG improving today   on prednsione 10mg   Cr is increasing     MEDICATIONS  (STANDING):  ALBUTerol/ipratropium for Nebulization 3 milliLiter(s) Nebulizer every 6 hours  buDESOnide 160 MICROgram(s)/formoterol 4.5 MICROgram(s) Inhaler 2 Puff(s) Inhalation two times a day  dextrose 50% Injectable 25 milliLiter(s) IV Push once  docusate sodium 100 milliGRAM(s) Oral three times a day  fluticasone propionate 50 MICROgram(s)/spray Nasal Spray 1 Spray(s) Both Nostrils two times a day  gabapentin 300 milliGRAM(s) Oral three times a day  heparin  Injectable 5000 Unit(s) SubCutaneous every 8 hours  influenza   Vaccine 0.5 milliLiter(s) IntraMuscular once  insulin glargine Injectable (LANTUS) 10 Unit(s) SubCutaneous at bedtime  insulin lispro (HumaLOG) corrective regimen sliding scale   SubCutaneous three times a day before meals  insulin lispro (HumaLOG) corrective regimen sliding scale   SubCutaneous at bedtime  levothyroxine 137 MICROGram(s) Oral daily  metoprolol tartrate 25 milliGRAM(s) Oral two times a day  oseltamivir 75 milliGRAM(s) Oral two times a day  pantoprazole    Tablet 40 milliGRAM(s) Oral before breakfast  piperacillin/tazobactam IVPB. 3.375 Gram(s) IV Intermittent every 8 hours  polyethylene glycol 3350 17 Gram(s) Oral daily  predniSONE   Tablet 10 milliGRAM(s) Oral daily  senna 2 Tablet(s) Oral at bedtime  simvastatin 20 milliGRAM(s) Oral at bedtime  sodium chloride 3%  Inhalation 4 milliLiter(s) Inhalation every 6 hours  tiotropium 18 MICROgram(s) Capsule 1 Capsule(s) Inhalation daily  traMADol 50 milliGRAM(s) Oral every 6 hours    MEDICATIONS  (PRN):  acetaminophen   Tablet .. 650 milliGRAM(s) Oral every 6 hours PRN Temp greater or equal to 38C (100.4F)  benzocaine 15 mG/menthol 3.6 mG (Sugar-Free) Lozenge 1 Lozenge Oral every 4 hours PRN Sore Throat  calcium carbonate    500 mG (Tums) Chewable 1 Tablet(s) Chew every 4 hours PRN Heartburn  guaiFENesin  milliGRAM(s) Oral every 12 hours PRN Cough  oxyCODONE    IR 15 milliGRAM(s) Oral every 3 hours PRN Severe Pain (7 - 10)  sodium chloride 0.65% Nasal 1 Spray(s) Both Nostrils every 6 hours PRN Nasal Congestion      PHYSICAL EXAM:  VITALS: T(C): 39.2 (01-04-19 @ 09:05)  T(F): 102.6 (01-04-19 @ 09:05), Max: 102.6 (01-04-19 @ 09:05)  HR: 98 (01-04-19 @ 05:56) (83 - 127)  BP: 154/75 (01-04-19 @ 05:56) (106/89 - 166/87)  RR:  (14 - 28)  SpO2:  (91% - 97%)  Wt(kg): --  GENERAL: NAD, well-groomed, well-developed  EYES: No proptosis, no injection  HEENT:  Atraumatic, Normocephalic, moist mucous membraness  RESPIRATORY: Clear to auscultation bilaterally; No rales, rhonchi, wheezing, or rubs  CARDIOVASCULAR: Regular rate and rhythm; No murmurs; no peripheral edema  GI: Soft, nontender,     POCT Blood Glucose.: 157 mg/dL (01-04-19 @ 08:28)  POCT Blood Glucose.: 187 mg/dL (01-03-19 @ 23:31)  POCT Blood Glucose.: 146 mg/dL (01-03-19 @ 23:00)  POCT Blood Glucose.: 229 mg/dL (01-03-19 @ 16:12)  POCT Blood Glucose.: 370 mg/dL (01-03-19 @ 11:28)  POCT Blood Glucose.: 275 mg/dL (01-03-19 @ 07:58)  POCT Blood Glucose.: 229 mg/dL (01-02-19 @ 21:20)  POCT Blood Glucose.: 169 mg/dL (01-02-19 @ 16:29)  POCT Blood Glucose.: 222 mg/dL (01-02-19 @ 12:19)  POCT Blood Glucose.: 205 mg/dL (01-02-19 @ 08:06)  POCT Blood Glucose.: 294 mg/dL (01-02-19 @ 07:47)  POCT Blood Glucose.: 122 mg/dL (01-01-19 @ 22:19)  POCT Blood Glucose.: 206 mg/dL (01-01-19 @ 18:40)  POCT Blood Glucose.: 231 mg/dL (01-01-19 @ 12:54)    01-04    138  |  102  |  16  ----------------------------<  185<H>  4.8   |  23  |  1.32<H>    EGFR if : 50  EGFR if non : 43    Ca    8.3<L>      01-04  Mg     2.4     01-04  Phos  2.6     01-04            Thyroid Function Tests:  12-27 @ 07:15 TSH -- FreeT4 1.18 T3 -- Anti TPO -- Anti Thyroglobulin Ab -- TSI --  12-20 @ 06:45 TSH 40.15 FreeT4 0.98 T3 -- Anti TPO -- Anti Thyroglobulin Ab -- TSI --      Hemoglobin A1C, Whole Blood: 7.1 % <H> [4.0 - 5.6] (12-17-18 @ 03:50)  Hemoglobin A1C, Whole Blood: 7.3 % <H> [4.0 - 5.6] (12-16-18 @ 04:17) Chief Complaint/Follow-up on:   DM    Subjective:  POC blood glucose and insulin use reviewed.  FSBG improving today   on prednsione 10mg   Cr is increasing   pt sleeping on BIPAP at the time of my exam  per family pt is mostly asleep all day and does not eat     MEDICATIONS  (STANDING):  ALBUTerol/ipratropium for Nebulization 3 milliLiter(s) Nebulizer every 6 hours  buDESOnide 160 MICROgram(s)/formoterol 4.5 MICROgram(s) Inhaler 2 Puff(s) Inhalation two times a day  dextrose 50% Injectable 25 milliLiter(s) IV Push once  docusate sodium 100 milliGRAM(s) Oral three times a day  fluticasone propionate 50 MICROgram(s)/spray Nasal Spray 1 Spray(s) Both Nostrils two times a day  gabapentin 300 milliGRAM(s) Oral three times a day  heparin  Injectable 5000 Unit(s) SubCutaneous every 8 hours  influenza   Vaccine 0.5 milliLiter(s) IntraMuscular once  insulin glargine Injectable (LANTUS) 10 Unit(s) SubCutaneous at bedtime  insulin lispro (HumaLOG) corrective regimen sliding scale   SubCutaneous three times a day before meals  insulin lispro (HumaLOG) corrective regimen sliding scale   SubCutaneous at bedtime  levothyroxine 137 MICROGram(s) Oral daily  metoprolol tartrate 25 milliGRAM(s) Oral two times a day  oseltamivir 75 milliGRAM(s) Oral two times a day  pantoprazole    Tablet 40 milliGRAM(s) Oral before breakfast  piperacillin/tazobactam IVPB. 3.375 Gram(s) IV Intermittent every 8 hours  polyethylene glycol 3350 17 Gram(s) Oral daily  predniSONE   Tablet 10 milliGRAM(s) Oral daily  senna 2 Tablet(s) Oral at bedtime  simvastatin 20 milliGRAM(s) Oral at bedtime  sodium chloride 3%  Inhalation 4 milliLiter(s) Inhalation every 6 hours  tiotropium 18 MICROgram(s) Capsule 1 Capsule(s) Inhalation daily  traMADol 50 milliGRAM(s) Oral every 6 hours    MEDICATIONS  (PRN):  acetaminophen   Tablet .. 650 milliGRAM(s) Oral every 6 hours PRN Temp greater or equal to 38C (100.4F)  benzocaine 15 mG/menthol 3.6 mG (Sugar-Free) Lozenge 1 Lozenge Oral every 4 hours PRN Sore Throat  calcium carbonate    500 mG (Tums) Chewable 1 Tablet(s) Chew every 4 hours PRN Heartburn  guaiFENesin  milliGRAM(s) Oral every 12 hours PRN Cough  oxyCODONE    IR 15 milliGRAM(s) Oral every 3 hours PRN Severe Pain (7 - 10)  sodium chloride 0.65% Nasal 1 Spray(s) Both Nostrils every 6 hours PRN Nasal Congestion      PHYSICAL EXAM:  VITALS: T(C): 39.2 (01-04-19 @ 09:05)  T(F): 102.6 (01-04-19 @ 09:05), Max: 102.6 (01-04-19 @ 09:05)  HR: 98 (01-04-19 @ 05:56) (83 - 127)  BP: 154/75 (01-04-19 @ 05:56) (106/89 - 166/87)  RR:  (14 - 28)  SpO2:  (91% - 97%)  Wt(kg): --  GENERAL: NAD, well-groomed, well-developed  EYES: No proptosis, no injection  HEENT:  Atraumatic, Normocephalic, moist mucous membraness  RESPIRATORY: Clear to auscultation bilaterally; No rales, rhonchi, wheezing, or rubs  CARDIOVASCULAR: Regular rate and rhythm; No murmurs; no peripheral edema  GI: Soft, nontender,     POCT Blood Glucose.: 157 mg/dL (01-04-19 @ 08:28)  POCT Blood Glucose.: 187 mg/dL (01-03-19 @ 23:31)  POCT Blood Glucose.: 146 mg/dL (01-03-19 @ 23:00)  POCT Blood Glucose.: 229 mg/dL (01-03-19 @ 16:12)  POCT Blood Glucose.: 370 mg/dL (01-03-19 @ 11:28)  POCT Blood Glucose.: 275 mg/dL (01-03-19 @ 07:58)  POCT Blood Glucose.: 229 mg/dL (01-02-19 @ 21:20)  POCT Blood Glucose.: 169 mg/dL (01-02-19 @ 16:29)  POCT Blood Glucose.: 222 mg/dL (01-02-19 @ 12:19)  POCT Blood Glucose.: 205 mg/dL (01-02-19 @ 08:06)  POCT Blood Glucose.: 294 mg/dL (01-02-19 @ 07:47)  POCT Blood Glucose.: 122 mg/dL (01-01-19 @ 22:19)  POCT Blood Glucose.: 206 mg/dL (01-01-19 @ 18:40)  POCT Blood Glucose.: 231 mg/dL (01-01-19 @ 12:54)    01-04    138  |  102  |  16  ----------------------------<  185<H>  4.8   |  23  |  1.32<H>    EGFR if : 50  EGFR if non : 43    Ca    8.3<L>      01-04  Mg     2.4     01-04  Phos  2.6     01-04            Thyroid Function Tests:  12-27 @ 07:15 TSH -- FreeT4 1.18 T3 -- Anti TPO -- Anti Thyroglobulin Ab -- TSI --  12-20 @ 06:45 TSH 40.15 FreeT4 0.98 T3 -- Anti TPO -- Anti Thyroglobulin Ab -- TSI --      Hemoglobin A1C, Whole Blood: 7.1 % <H> [4.0 - 5.6] (12-17-18 @ 03:50)  Hemoglobin A1C, Whole Blood: 7.3 % <H> [4.0 - 5.6] (12-16-18 @ 04:17)

## 2019-01-04 NOTE — PROGRESS NOTE ADULT - PROBLEM SELECTOR PLAN 1
check FSBG TID qac and hs  carb consistent diet   - Lantus  10 u qhs  -moderate dose SS TIDAC/qhs  -uncertain PO intake, therefore, ISS for now to calculate 24 hrs need   -Currently on home regimen and prednisone 10mg daily   -PO status still somewhat uncertain, will continue with moderate does sliding scale.      - d/c likely on prandin, sitagliptin and likely lower dose tresiba pending FSG trends check FSBG TID qac and hs  carb consistent diet   - Lantus  10 u qhs  -moderate dose SS TIDAC/qhs  -uncertain PO intake, therefore, only ISS for now to calculate 24 hrs need and if premeal insulin is warranted as well  -Currently on home regimen and prednisone 10mg daily   -PO status still somewhat uncertain, will continue with moderate does sliding scale.      - d/c likely:  on prandin, sitagliptin and likely lower dose tresiba pending FSG trends check FSBG TID qac and hs  carb consistent diet   - Lantus  10 u qhs  -decrease to low dose SS TIDAC/qhs given taper in steroids to home dose   -uncertain PO intake, therefore, only ISS for now to calculate 24 hrs need and if premeal insulin is warranted as well  -Currently on home regimen and prednisone 10mg daily   -PO status still somewhat uncertain, will continue with moderate does sliding scale.    inform endocrine of hypoglycemia or persistent hyperglycemia episodes as changes in pts insulin regimen will need to be made.   notify endocrine if any plans to be NPO/diet changes as this will also affect insulin regimen.    - d/c likely:  on prandin, sitagliptin and likely lower dose tresiba pending FSG trends check FSBG TID qac and hs  carb consistent diet   - Lantus  10 u qhs  -decrease to low dose SS TIDAC/qhs given taper in steroids to home dose   -uncertain to very low PO intake, therefore, only ISS for now to calculate 24 hrs need and if premeal insulin is warranted as well   inform endocrine of hypoglycemia or persistent hyperglycemia episodes as changes in pts insulin regimen will need to be made.   notify endocrine if any plans to be NPO/diet changes as this will also affect insulin regimen.    - d/c likely:  LIKELY DC regimen:  sitagliptin and likely lower dose tresiba pending FSG trends

## 2019-01-04 NOTE — PROGRESS NOTE ADULT - PROBLEM SELECTOR PLAN 3
On hydrocortisone taper with plan to change to PO prednisone tomorrow.  Would not decrease prednisone below 10 mg which is her maintenance dose without discussing with her outpatient physicians. Also she had fever overnight and given stress state and potential for adrenal insufficiency if lowering steroid too much. Ok to proceed with prednisone 10mg daily. If she becomes hypotensive on this dose consider increasing to 15-20mg daily from the adrenal standpoint. PO prednisone   Would not decrease prednisone below 10 mg which is her maintenance dose without discussing with her outpatient physicians.     IF HD unstable or plans for surgery, pt will need stress dose steroids (hydrocort 50q8*)  if with fever or infection during inpt course, will need to consider to increase prednisone to 20 or 30mg daily

## 2019-01-04 NOTE — PROGRESS NOTE ADULT - ATTENDING COMMENTS
Tx from SICU. Influenza A infection , multifocal pneumonia, in pt with mutiple comorbidities, acute on chronic resp failure with hypercapnea and hypoxia, SBO, recurrent surgery, wound  with ret sutures, daily surgical follow up  awake and alert  cont tamiflu, vanco/zosyn  bipap 16/8  lasix

## 2019-01-04 NOTE — PROGRESS NOTE ADULT - ASSESSMENT
63F w/ multiple respiratory comorbidities p/w SBO s/p ex-lap with findings of healthy bowel c/b evisceration of bowel s/p ex-lap with placement of retention sutures.    - DVT ppx  - Cont abx for multifocal pna   - F/U blood cx  - monitor GI fxn   -care per RCU   - monitor vanc level     Fatoumata Danielle, PGY-1  [facility] [id number]

## 2019-01-04 NOTE — PROGRESS NOTE ADULT - PROBLEM SELECTOR PLAN 1
- with hypercapnea, 2/2 multifocal pna and flu  - cont abx  - tamiflu  - s/p extubation to BIPAP/high flow, wean as tolerated  - chest PT

## 2019-01-04 NOTE — PROGRESS NOTE ADULT - ATTENDING COMMENTS
Cristin Spicer MD  Pager [phone] ([facility])/ [phone] ([facility]) [please provide 10 digit call back number]  Nights and weekends: [phone]  Please note that this patient may be followed by a different provider tomorrow. If no answer or after hours, please contact [phone].  For final dc reccomendations, please call [phone] or page the endocrine fellow on call.

## 2019-01-04 NOTE — PROGRESS NOTE ADULT - ASSESSMENT
63F hx COPD (3L O2), DM, RA, pulm HTN, c-s x2 presents with SBO concerning for compromised bowel; ex-lap SHANNON 12/16, UTI (Abx -12/31), now s/p ex-lap 12/30 after eviscerated w findings of intact bowel, closed w retention sutures, intubated post op, extubated 12/31

## 2019-01-05 LAB
BACTERIA BLD CULT: SIGNIFICANT CHANGE UP
BASE EXCESS BLDA CALC-SCNC: 5.9 MMOL/L — SIGNIFICANT CHANGE UP
BUN SERPL-MCNC: 18 MG/DL — SIGNIFICANT CHANGE UP (ref 7–23)
CA-I BLDA-SCNC: 1.13 MMOL/L — LOW (ref 1.15–1.29)
CALCIUM SERPL-MCNC: 7.6 MG/DL — LOW (ref 8.4–10.5)
CHLORIDE SERPL-SCNC: 107 MMOL/L — SIGNIFICANT CHANGE UP (ref 98–107)
CO2 SERPL-SCNC: 25 MMOL/L — SIGNIFICANT CHANGE UP (ref 22–31)
CREAT SERPL-MCNC: 1.27 MG/DL — SIGNIFICANT CHANGE UP (ref 0.5–1.3)
GLUCOSE BLDA-MCNC: 190 MG/DL — HIGH (ref 70–99)
GLUCOSE BLDC GLUCOMTR-MCNC: 103 MG/DL — HIGH (ref 70–99)
GLUCOSE BLDC GLUCOMTR-MCNC: 140 MG/DL — HIGH (ref 70–99)
GLUCOSE BLDC GLUCOMTR-MCNC: 158 MG/DL — HIGH (ref 70–99)
GLUCOSE BLDC GLUCOMTR-MCNC: 192 MG/DL — HIGH (ref 70–99)
GLUCOSE BLDC GLUCOMTR-MCNC: 209 MG/DL — HIGH (ref 70–99)
GLUCOSE BLDC GLUCOMTR-MCNC: 229 MG/DL — HIGH (ref 70–99)
GLUCOSE SERPL-MCNC: 119 MG/DL — HIGH (ref 70–99)
HCO3 BLDA-SCNC: 29 MMOL/L — HIGH (ref 22–26)
HCT VFR BLD CALC: 24.2 % — LOW (ref 34.5–45)
HCT VFR BLDA CALC: 24.9 % — LOW (ref 34.5–46.5)
HGB BLD-MCNC: 7.5 G/DL — LOW (ref 11.5–15.5)
HGB BLDA-MCNC: 8 G/DL — LOW (ref 11.5–15.5)
LACTATE BLDA-SCNC: 0.9 MMOL/L — SIGNIFICANT CHANGE UP (ref 0.5–2)
MCHC RBC-ENTMCNC: 30 PG — SIGNIFICANT CHANGE UP (ref 27–34)
MCHC RBC-ENTMCNC: 31 % — LOW (ref 32–36)
MCV RBC AUTO: 96.8 FL — SIGNIFICANT CHANGE UP (ref 80–100)
NRBC # FLD: 0.19 K/UL — LOW (ref 25–125)
NRBC FLD-RTO: 2.3 — SIGNIFICANT CHANGE UP
PCO2 BLDA: 52 MMHG — HIGH (ref 32–48)
PH BLDA: 7.39 PH — SIGNIFICANT CHANGE UP (ref 7.35–7.45)
PLATELET # BLD AUTO: 352 K/UL — SIGNIFICANT CHANGE UP (ref 150–400)
PMV BLD: 9.3 FL — SIGNIFICANT CHANGE UP (ref 7–13)
PO2 BLDA: 62 MMHG — LOW (ref 83–108)
POTASSIUM BLDA-SCNC: 4 MMOL/L — SIGNIFICANT CHANGE UP (ref 3.4–4.5)
POTASSIUM SERPL-MCNC: 4.2 MMOL/L — SIGNIFICANT CHANGE UP (ref 3.5–5.3)
POTASSIUM SERPL-SCNC: 4.2 MMOL/L — SIGNIFICANT CHANGE UP (ref 3.5–5.3)
RBC # BLD: 2.5 M/UL — LOW (ref 3.8–5.2)
RBC # FLD: 17.7 % — HIGH (ref 10.3–14.5)
SAO2 % BLDA: 91 % — LOW (ref 95–99)
SODIUM BLDA-SCNC: 139 MMOL/L — SIGNIFICANT CHANGE UP (ref 136–146)
SODIUM SERPL-SCNC: 145 MMOL/L — SIGNIFICANT CHANGE UP (ref 135–145)
WBC # BLD: 8.29 K/UL — SIGNIFICANT CHANGE UP (ref 3.8–10.5)
WBC # FLD AUTO: 8.29 K/UL — SIGNIFICANT CHANGE UP (ref 3.8–10.5)

## 2019-01-05 PROCEDURE — 99233 SBSQ HOSP IP/OBS HIGH 50: CPT | Mod: GC

## 2019-01-05 PROCEDURE — 99291 CRITICAL CARE FIRST HOUR: CPT

## 2019-01-05 RX ORDER — ACETAMINOPHEN 500 MG
1000 TABLET ORAL ONCE
Qty: 0 | Refills: 0 | Status: DISCONTINUED | OUTPATIENT
Start: 2019-01-05 | End: 2019-01-06

## 2019-01-05 RX ORDER — HYDROMORPHONE HYDROCHLORIDE 2 MG/ML
0.5 INJECTION INTRAMUSCULAR; INTRAVENOUS; SUBCUTANEOUS ONCE
Qty: 0 | Refills: 0 | Status: DISCONTINUED | OUTPATIENT
Start: 2019-01-05 | End: 2019-01-05

## 2019-01-05 RX ORDER — FUROSEMIDE 40 MG
40 TABLET ORAL ONCE
Qty: 0 | Refills: 0 | Status: COMPLETED | OUTPATIENT
Start: 2019-01-05 | End: 2019-01-05

## 2019-01-05 RX ORDER — KETOROLAC TROMETHAMINE 30 MG/ML
15 SYRINGE (ML) INJECTION EVERY 6 HOURS
Qty: 0 | Refills: 0 | Status: DISCONTINUED | OUTPATIENT
Start: 2019-01-05 | End: 2019-01-06

## 2019-01-05 RX ADMIN — Medication 1: at 17:32

## 2019-01-05 RX ADMIN — TRAMADOL HYDROCHLORIDE 50 MILLIGRAM(S): 50 TABLET ORAL at 00:14

## 2019-01-05 RX ADMIN — Medication 100 MILLIGRAM(S): at 06:26

## 2019-01-05 RX ADMIN — GABAPENTIN 300 MILLIGRAM(S): 400 CAPSULE ORAL at 06:26

## 2019-01-05 RX ADMIN — GABAPENTIN 300 MILLIGRAM(S): 400 CAPSULE ORAL at 22:52

## 2019-01-05 RX ADMIN — SODIUM CHLORIDE 4 MILLILITER(S): 9 INJECTION INTRAMUSCULAR; INTRAVENOUS; SUBCUTANEOUS at 04:47

## 2019-01-05 RX ADMIN — GABAPENTIN 300 MILLIGRAM(S): 400 CAPSULE ORAL at 00:14

## 2019-01-05 RX ADMIN — OXYCODONE HYDROCHLORIDE 15 MILLIGRAM(S): 5 TABLET ORAL at 20:45

## 2019-01-05 RX ADMIN — Medication 10 MILLIGRAM(S): at 06:27

## 2019-01-05 RX ADMIN — Medication 15 MILLIGRAM(S): at 00:17

## 2019-01-05 RX ADMIN — HYDROMORPHONE HYDROCHLORIDE 0.5 MILLIGRAM(S): 2 INJECTION INTRAMUSCULAR; INTRAVENOUS; SUBCUTANEOUS at 02:21

## 2019-01-05 RX ADMIN — Medication 40 MILLIGRAM(S): at 10:32

## 2019-01-05 RX ADMIN — Medication 3 MILLILITER(S): at 04:35

## 2019-01-05 RX ADMIN — SIMVASTATIN 20 MILLIGRAM(S): 20 TABLET, FILM COATED ORAL at 00:13

## 2019-01-05 RX ADMIN — Medication 3 MILLILITER(S): at 16:33

## 2019-01-05 RX ADMIN — OXYCODONE HYDROCHLORIDE 15 MILLIGRAM(S): 5 TABLET ORAL at 09:42

## 2019-01-05 RX ADMIN — OXYCODONE HYDROCHLORIDE 15 MILLIGRAM(S): 5 TABLET ORAL at 10:02

## 2019-01-05 RX ADMIN — TRAMADOL HYDROCHLORIDE 50 MILLIGRAM(S): 50 TABLET ORAL at 06:26

## 2019-01-05 RX ADMIN — SIMVASTATIN 20 MILLIGRAM(S): 20 TABLET, FILM COATED ORAL at 22:51

## 2019-01-05 RX ADMIN — GABAPENTIN 300 MILLIGRAM(S): 400 CAPSULE ORAL at 12:55

## 2019-01-05 RX ADMIN — TRAMADOL HYDROCHLORIDE 50 MILLIGRAM(S): 50 TABLET ORAL at 12:53

## 2019-01-05 RX ADMIN — Medication 30 MILLIGRAM(S): at 17:31

## 2019-01-05 RX ADMIN — HEPARIN SODIUM 5000 UNIT(S): 5000 INJECTION INTRAVENOUS; SUBCUTANEOUS at 22:52

## 2019-01-05 RX ADMIN — Medication 30 MILLIGRAM(S): at 00:14

## 2019-01-05 RX ADMIN — Medication 137 MICROGRAM(S): at 22:52

## 2019-01-05 RX ADMIN — Medication 137 MICROGRAM(S): at 00:13

## 2019-01-05 RX ADMIN — Medication 15 MILLIGRAM(S): at 19:15

## 2019-01-05 RX ADMIN — Medication 2: at 12:46

## 2019-01-05 RX ADMIN — Medication 100 MILLIGRAM(S): at 22:51

## 2019-01-05 RX ADMIN — TRAMADOL HYDROCHLORIDE 50 MILLIGRAM(S): 50 TABLET ORAL at 06:56

## 2019-01-05 RX ADMIN — INSULIN GLARGINE 10 UNIT(S): 100 INJECTION, SOLUTION SUBCUTANEOUS at 22:52

## 2019-01-05 RX ADMIN — TRAMADOL HYDROCHLORIDE 50 MILLIGRAM(S): 50 TABLET ORAL at 17:21

## 2019-01-05 RX ADMIN — PIPERACILLIN AND TAZOBACTAM 25 GRAM(S): 4; .5 INJECTION, POWDER, LYOPHILIZED, FOR SOLUTION INTRAVENOUS at 17:21

## 2019-01-05 RX ADMIN — Medication 3 MILLILITER(S): at 10:24

## 2019-01-05 RX ADMIN — HEPARIN SODIUM 5000 UNIT(S): 5000 INJECTION INTRAVENOUS; SUBCUTANEOUS at 06:28

## 2019-01-05 RX ADMIN — Medication 30 MILLIGRAM(S): at 06:44

## 2019-01-05 RX ADMIN — Medication 1 SPRAY(S): at 06:26

## 2019-01-05 RX ADMIN — SENNA PLUS 2 TABLET(S): 8.6 TABLET ORAL at 00:14

## 2019-01-05 RX ADMIN — TRAMADOL HYDROCHLORIDE 50 MILLIGRAM(S): 50 TABLET ORAL at 00:44

## 2019-01-05 RX ADMIN — HEPARIN SODIUM 5000 UNIT(S): 5000 INJECTION INTRAVENOUS; SUBCUTANEOUS at 12:54

## 2019-01-05 RX ADMIN — HYDROMORPHONE HYDROCHLORIDE 0.5 MILLIGRAM(S): 2 INJECTION INTRAMUSCULAR; INTRAVENOUS; SUBCUTANEOUS at 01:51

## 2019-01-05 RX ADMIN — OXYCODONE HYDROCHLORIDE 15 MILLIGRAM(S): 5 TABLET ORAL at 21:45

## 2019-01-05 RX ADMIN — PANTOPRAZOLE SODIUM 40 MILLIGRAM(S): 20 TABLET, DELAYED RELEASE ORAL at 06:26

## 2019-01-05 RX ADMIN — PIPERACILLIN AND TAZOBACTAM 25 GRAM(S): 4; .5 INJECTION, POWDER, LYOPHILIZED, FOR SOLUTION INTRAVENOUS at 09:58

## 2019-01-05 RX ADMIN — Medication 3 MILLILITER(S): at 21:44

## 2019-01-05 RX ADMIN — TRAMADOL HYDROCHLORIDE 50 MILLIGRAM(S): 50 TABLET ORAL at 13:30

## 2019-01-05 RX ADMIN — Medication 25 MILLIGRAM(S): at 18:12

## 2019-01-05 RX ADMIN — Medication 25 MILLIGRAM(S): at 06:27

## 2019-01-05 RX ADMIN — Medication 15 MILLIGRAM(S): at 18:44

## 2019-01-05 RX ADMIN — Medication 1 SPRAY(S): at 17:38

## 2019-01-05 RX ADMIN — PIPERACILLIN AND TAZOBACTAM 25 GRAM(S): 4; .5 INJECTION, POWDER, LYOPHILIZED, FOR SOLUTION INTRAVENOUS at 01:31

## 2019-01-05 RX ADMIN — SENNA PLUS 2 TABLET(S): 8.6 TABLET ORAL at 22:51

## 2019-01-05 RX ADMIN — Medication 100 MILLIGRAM(S): at 12:54

## 2019-01-05 NOTE — PROGRESS NOTE ADULT - ASSESSMENT
63F w/ multiple respiratory comorbidities p/w SBO s/p ex-lap with findings of healthy bowel c/b evisceration of bowel s/p ex-lap with placement of retention sutures with increasing respiratory demand on Bipap.      - SICU re-consult  - DVT ppx  - Cont abx for multifocal pna   - F/U blood cx  - monitor GI fxn   - care per RCU with pending SICU consult  - monitor vanc level 63F w/ multiple respiratory comorbidities p/w SBO s/p ex-lap with findings of healthy bowel c/b evisceration of bowel s/p ex-lap with placement of retention sutures with increasing respiratory demand on Bipap.      - SICU re-consult  - DVT ppx  - Cont abx for multifocal pna   - F/U blood cx  - monitor GI fxn   - care per RCU with pending SICU consult

## 2019-01-05 NOTE — PROGRESS NOTE ADULT - ATTENDING COMMENTS
Influenza A, multifocal pneumonia, pulmonary edema  s/p abdominal surgery, open wound with abd binder  acute on chronic resp failure with hypoxia and hypercapnea  overall doing better. awake and alert, complaining that she wants iv dilaudid  afebrile today. still on bipap, on to high flow to eat.  Lasix 40 IV  sputum and blood cx neg. d/c vanco. cont tamiflu , cont  zosyn

## 2019-01-05 NOTE — CONSULT NOTE ADULT - ATTENDING COMMENTS
The patient is a critical care patient with hemodynamic and metabolic instability in SICU.  I have personally interviewed and examined this patient, reviewed labs and x-rays, discussed with other consultants, House staff and PA's.  I spent   35  minutes  in total providing critical care for the diagnoses, assessment and plan above.  These diagnoses are unrelated to the surgical procedure noted above.  Time involved in performance of separately billable procedures was not counted toward my critical care time.  There is no overlap.    Current Issues:  J18.9 Multifocal pneumonia   - continuing broad spectrum abx, as well as tamiflu for influenza  R09.02 Hypoxia   - tolerating bipap well, and breathing comfortably, but requiring 95% FiO2 on bipap.  To be monitored in SICU for need for possible intubation if condition worsens  T81.30XA Wound dehiscence   - s/p OR repair with retention sutures.  On serial abd exams  E11.9 Type 2 diabetes mellitus without complication, unspecified whether long term insulin use   - RISS  E03.9 Hypothyroidism, unspecified type   - on synthroid  M06.9 Rheumatoid arthritis, involving unspecified site, unspecified rheumatoid factor presence   - continuing corticosteroids  D64.9 Anemia, unspecified type   - no signs of clinically significant bleeding on exam  Z91.89 At risk for malnutrition   - on diet, although if resp status worsens may make NPO out of concern for impending intubation.  However, patient at baseline has hypoxia and poor resp status, and nutrition is vital to her wound healing so will continue on diet for now.

## 2019-01-05 NOTE — PROGRESS NOTE ADULT - PROBLEM SELECTOR PLAN 1
- with hypercapnea, 2/2 multifocal pna and flu  - cultures negative, will d/c vanco  - cont abx, zosyn (day 5 today)  - tamiflu treatment dose  - lasix 40mg IVP, would keep pt net negative  - s/p extubation to BIPAP/high flow, wean as tolerated  - pt is more alert today and responsive  - chest PT - with hypercapnea, 2/2 multifocal pna and flu  - cultures negative, will d/c vanco  - cont abx, zosyn (day 5 today)  - tamiflu treatment dose  - lasix 40mg IVP, would keep pt net negative  - s/p extubation to BIPAP/high flow, wean as tolerated  - pt is more alert today and responsive  - acceptable sats 88-93% given pulmHTN  - chest PT

## 2019-01-05 NOTE — PROGRESS NOTE ADULT - SUBJECTIVE AND OBJECTIVE BOX
CHIEF COMPLAINT: Patient is a 63y old  Female who presents with a chief complaint of SBO (03 Jan 2019 16:43)    Interval Events:      REVIEW OF SYSTEMS:  Constitutional:   Eyes:  ENT:  CV:  Resp:  GI:  :  MSK:  Integumentary:  Neurological:  Psychiatric:  Endocrine:  Hematologic/Lymphatic:  Allergic/Immunologic:  [ ] All other systems negative  [ ] Unable to assess ROS because ________      OBJECTIVE:  ICU Vital Signs Last 24 Hrs  T(C): 36.7 (05 Jan 2019 06:18), Max: 39.2 (04 Jan 2019 09:05)  T(F): 98.1 (05 Jan 2019 06:18), Max: 102.6 (04 Jan 2019 09:05)  HR: 85 (05 Jan 2019 07:41) (82 - 98)  BP: 146/68 (05 Jan 2019 06:18) (114/65 - 146/68)  BP(mean): --  ABP: --  ABP(mean): --  RR: 19 (05 Jan 2019 06:18) (17 - 21)  SpO2: 90% (05 Jan 2019 07:41) (87% - 97%)    POCT Blood Glucose.: 103 mg/dL (05 Jan 2019 08:03)    HOSPITAL MEDICATIONS:  MEDICATIONS  (STANDING):  ALBUTerol/ipratropium for Nebulization 3 milliLiter(s) Nebulizer every 6 hours  buDESOnide 160 MICROgram(s)/formoterol 4.5 MICROgram(s) Inhaler 2 Puff(s) Inhalation two times a day  dextrose 50% Injectable 25 milliLiter(s) IV Push once  docusate sodium 100 milliGRAM(s) Oral three times a day  fluticasone propionate 50 MICROgram(s)/spray Nasal Spray 1 Spray(s) Both Nostrils two times a day  gabapentin 300 milliGRAM(s) Oral three times a day  heparin  Injectable 5000 Unit(s) SubCutaneous every 8 hours  influenza   Vaccine 0.5 milliLiter(s) IntraMuscular once  insulin glargine Injectable (LANTUS) 10 Unit(s) SubCutaneous at bedtime  insulin lispro (HumaLOG) corrective regimen sliding scale   SubCutaneous three times a day before meals  levothyroxine 137 MICROGram(s) Oral daily  metoprolol tartrate 25 milliGRAM(s) Oral two times a day  oseltamivir 30 milliGRAM(s) Oral two times a day  pantoprazole    Tablet 40 milliGRAM(s) Oral before breakfast  piperacillin/tazobactam IVPB. 3.375 Gram(s) IV Intermittent every 8 hours  polyethylene glycol 3350 17 Gram(s) Oral daily  predniSONE   Tablet 10 milliGRAM(s) Oral daily  senna 2 Tablet(s) Oral at bedtime  simvastatin 20 milliGRAM(s) Oral at bedtime  sodium chloride 3%  Inhalation 4 milliLiter(s) Inhalation every 6 hours  traMADol 50 milliGRAM(s) Oral every 6 hours  vancomycin  IVPB 1000 milliGRAM(s) IV Intermittent every 24 hours    MEDICATIONS  (PRN):  acetaminophen   Tablet .. 650 milliGRAM(s) Oral every 6 hours PRN Temp greater or equal to 38C (100.4F)  benzocaine 15 mG/menthol 3.6 mG (Sugar-Free) Lozenge 1 Lozenge Oral every 4 hours PRN Sore Throat  calcium carbonate    500 mG (Tums) Chewable 1 Tablet(s) Chew every 4 hours PRN Heartburn  guaiFENesin  milliGRAM(s) Oral every 12 hours PRN Cough  oxyCODONE    IR 15 milliGRAM(s) Oral every 3 hours PRN Severe Pain (7 - 10)  sodium chloride 0.65% Nasal 1 Spray(s) Both Nostrils every 6 hours PRN Nasal Congestion      LABS:                        7.5    8.29  )-----------( 352      ( 05 Jan 2019 05:45 )             24.2     01-05    145  |  107  |  18  ----------------------------<  119<H>  4.2   |  25  |  1.27    Ca    7.6<L>      05 Jan 2019 05:45  Phos  2.6     01-04  Mg     2.4     01-04          Arterial Blood Gas:  01-03 @ 13:08  7.29/51/56/22/85.6/-1.8  ABG lactate: 1.9        MICROBIOLOGY:     RADIOLOGY:  [ ] Reviewed and interpreted by me    PULMONARY FUNCTION TESTS:    EKG: CHIEF COMPLAINT: Patient is a 63y old  Female who presents with a chief complaint of SBO (03 Jan 2019 16:43)    Interval Events: tolerated BIPAP overnight, still on 100% fi02      REVIEW OF SYSTEMS:  Constitutional: c/o pain at abd incision site  CV: denies  Resp: COREAS  GI: denies  [x] All other systems negative  [ ] Unable to assess ROS because ________      OBJECTIVE:  ICU Vital Signs Last 24 Hrs  T(C): 36.7 (05 Jan 2019 06:18), Max: 39.2 (04 Jan 2019 09:05)  T(F): 98.1 (05 Jan 2019 06:18), Max: 102.6 (04 Jan 2019 09:05)  HR: 85 (05 Jan 2019 07:41) (82 - 98)  BP: 146/68 (05 Jan 2019 06:18) (114/65 - 146/68)  BP(mean): --  ABP: --  ABP(mean): --  RR: 19 (05 Jan 2019 06:18) (17 - 21)  SpO2: 90% (05 Jan 2019 07:41) (87% - 97%)    POCT Blood Glucose.: 103 mg/dL (05 Jan 2019 08:03)    HOSPITAL MEDICATIONS:  MEDICATIONS  (STANDING):  ALBUTerol/ipratropium for Nebulization 3 milliLiter(s) Nebulizer every 6 hours  buDESOnide 160 MICROgram(s)/formoterol 4.5 MICROgram(s) Inhaler 2 Puff(s) Inhalation two times a day  dextrose 50% Injectable 25 milliLiter(s) IV Push once  docusate sodium 100 milliGRAM(s) Oral three times a day  fluticasone propionate 50 MICROgram(s)/spray Nasal Spray 1 Spray(s) Both Nostrils two times a day  gabapentin 300 milliGRAM(s) Oral three times a day  heparin  Injectable 5000 Unit(s) SubCutaneous every 8 hours  influenza   Vaccine 0.5 milliLiter(s) IntraMuscular once  insulin glargine Injectable (LANTUS) 10 Unit(s) SubCutaneous at bedtime  insulin lispro (HumaLOG) corrective regimen sliding scale   SubCutaneous three times a day before meals  levothyroxine 137 MICROGram(s) Oral daily  metoprolol tartrate 25 milliGRAM(s) Oral two times a day  oseltamivir 30 milliGRAM(s) Oral two times a day  pantoprazole    Tablet 40 milliGRAM(s) Oral before breakfast  piperacillin/tazobactam IVPB. 3.375 Gram(s) IV Intermittent every 8 hours  polyethylene glycol 3350 17 Gram(s) Oral daily  predniSONE   Tablet 10 milliGRAM(s) Oral daily  senna 2 Tablet(s) Oral at bedtime  simvastatin 20 milliGRAM(s) Oral at bedtime  sodium chloride 3%  Inhalation 4 milliLiter(s) Inhalation every 6 hours  traMADol 50 milliGRAM(s) Oral every 6 hours  vancomycin  IVPB 1000 milliGRAM(s) IV Intermittent every 24 hours    MEDICATIONS  (PRN):  acetaminophen   Tablet .. 650 milliGRAM(s) Oral every 6 hours PRN Temp greater or equal to 38C (100.4F)  benzocaine 15 mG/menthol 3.6 mG (Sugar-Free) Lozenge 1 Lozenge Oral every 4 hours PRN Sore Throat  calcium carbonate    500 mG (Tums) Chewable 1 Tablet(s) Chew every 4 hours PRN Heartburn  guaiFENesin  milliGRAM(s) Oral every 12 hours PRN Cough  oxyCODONE    IR 15 milliGRAM(s) Oral every 3 hours PRN Severe Pain (7 - 10)  sodium chloride 0.65% Nasal 1 Spray(s) Both Nostrils every 6 hours PRN Nasal Congestion      LABS:                        7.5    8.29  )-----------( 352      ( 05 Jan 2019 05:45 )             24.2     01-05    145  |  107  |  18  ----------------------------<  119<H>  4.2   |  25  |  1.27    Ca    7.6<L>      05 Jan 2019 05:45  Phos  2.6     01-04  Mg     2.4     01-04          Arterial Blood Gas:  01-03 @ 13:08  7.29/51/56/22/85.6/-1.8  ABG lactate: 1.9        MICROBIOLOGY:     RADIOLOGY:  [ ] Reviewed and interpreted by me    PULMONARY FUNCTION TESTS:    EKG: CHIEF COMPLAINT: Patient is a 63y old  Female who presents with a chief complaint of SBO (03 Jan 2019 16:43)    Interval Events: tolerated BIPAP overnight, still on 100% fi02      REVIEW OF SYSTEMS:  Constitutional: c/o pain at abd incision site  CV: denies  Resp: COREAS  GI: denies  [x] All other systems negative  [ ] Unable to assess ROS because ________      OBJECTIVE:  ICU Vital Signs Last 24 Hrs  T(C): 36.7 (05 Jan 2019 06:18), Max: 39.2 (04 Jan 2019 09:05)  T(F): 98.1 (05 Jan 2019 06:18), Max: 102.6 (04 Jan 2019 09:05)  HR: 85 (05 Jan 2019 07:41) (82 - 98)  BP: 146/68 (05 Jan 2019 06:18) (114/65 - 146/68)  BP(mean): --  ABP: --  ABP(mean): --  RR: 19 (05 Jan 2019 06:18) (17 - 21)  SpO2: 90% (05 Jan 2019 07:41) (87% - 97%)    POCT Blood Glucose.: 103 mg/dL (05 Jan 2019 08:03)    HOSPITAL MEDICATIONS:  MEDICATIONS  (STANDING):  ALBUTerol/ipratropium for Nebulization 3 milliLiter(s) Nebulizer every 6 hours  buDESOnide 160 MICROgram(s)/formoterol 4.5 MICROgram(s) Inhaler 2 Puff(s) Inhalation two times a day  dextrose 50% Injectable 25 milliLiter(s) IV Push once  docusate sodium 100 milliGRAM(s) Oral three times a day  fluticasone propionate 50 MICROgram(s)/spray Nasal Spray 1 Spray(s) Both Nostrils two times a day  gabapentin 300 milliGRAM(s) Oral three times a day  heparin  Injectable 5000 Unit(s) SubCutaneous every 8 hours  influenza   Vaccine 0.5 milliLiter(s) IntraMuscular once  insulin glargine Injectable (LANTUS) 10 Unit(s) SubCutaneous at bedtime  insulin lispro (HumaLOG) corrective regimen sliding scale   SubCutaneous three times a day before meals  levothyroxine 137 MICROGram(s) Oral daily  metoprolol tartrate 25 milliGRAM(s) Oral two times a day  oseltamivir 30 milliGRAM(s) Oral two times a day  pantoprazole    Tablet 40 milliGRAM(s) Oral before breakfast  piperacillin/tazobactam IVPB. 3.375 Gram(s) IV Intermittent every 8 hours  polyethylene glycol 3350 17 Gram(s) Oral daily  predniSONE   Tablet 10 milliGRAM(s) Oral daily  senna 2 Tablet(s) Oral at bedtime  simvastatin 20 milliGRAM(s) Oral at bedtime  sodium chloride 3%  Inhalation 4 milliLiter(s) Inhalation every 6 hours  traMADol 50 milliGRAM(s) Oral every 6 hours  vancomycin  IVPB 1000 milliGRAM(s) IV Intermittent every 24 hours    MEDICATIONS  (PRN):  acetaminophen   Tablet .. 650 milliGRAM(s) Oral every 6 hours PRN Temp greater or equal to 38C (100.4F)  benzocaine 15 mG/menthol 3.6 mG (Sugar-Free) Lozenge 1 Lozenge Oral every 4 hours PRN Sore Throat  calcium carbonate    500 mG (Tums) Chewable 1 Tablet(s) Chew every 4 hours PRN Heartburn  guaiFENesin  milliGRAM(s) Oral every 12 hours PRN Cough  oxyCODONE    IR 15 milliGRAM(s) Oral every 3 hours PRN Severe Pain (7 - 10)  sodium chloride 0.65% Nasal 1 Spray(s) Both Nostrils every 6 hours PRN Nasal Congestion      LABS:                        7.5    8.29  )-----------( 352      ( 05 Jan 2019 05:45 )             24.2     01-05    145  |  107  |  18  ----------------------------<  119<H>  4.2   |  25  |  1.27    Ca    7.6<L>      05 Jan 2019 05:45

## 2019-01-05 NOTE — CONSULT NOTE ADULT - SUBJECTIVE AND OBJECTIVE BOX
SICU Consultation Note  =====================================================  63 year old woman with COPD on home O2 (3L), moderate pulmonary HTN, DM, RA on prednisone (10 mg daily x 30 years), HTN, s/p CVA (no residual deficit), s/p  x2 presents with abdominal pain for one day.  She developed mid abdominal cramping pain, sharp, last evening that has been constant and moderate to severe.  It was associated with multiple episodes of nonbloody, nonbilious emesis.  She had a normal bowel movement this morning and has not passed flatus today.  CT scan shows SBO with transition point in the lower abdomen with associated interloop fluid and mesenteric edema. (15 Dec 2018 23:33)  She underwent surgery with 4 retention sutures, evicerated on  when coughing, needing 6 more retention sutures. She then had multiple febrile episodes and underwent CT c/a/p which showed multifocal pneumonia. SHe has been on bipap intermittently during the sicu stay but was stepped down to floors on 1/3. She was then put on bipap on RCU. Consulted SICU for worsening resp status despite continuous bipap  with fevers. +flu with pneumonia.     PAST MEDICAL & SURGICAL HISTORY:  Diverticulosis  CVA (Cerebral Vascular Accident)  RA (Rheumatoid Arthritis)  Tooth Abscess  Arthritis  Cigarette Smoker  Chronic Asthma  Adult Hypothyroidism  Borderline Diabetes Mellitus  High Cholesterol  H/O: HTN  Wrist Disorder: S/P Surgery  History of  Section    Home Meds: Home Medications:  acetaminophen 325 mg oral tablet: 2 tab(s) orally every 6 hours as needed (30 May 2018 11:00)  alendronate: 1 tab(s) orally once a week (24 May 2018 12:34)  amLODIPine 5 mg oral tablet: 1 tab(s) orally once a day (24 May 2018 12:34)  clopidogrel 75 mg oral tablet: 1 tab(s) orally once a day (24 May 2018 12:34)  docusate sodium 100 mg oral capsule: 1 cap(s) orally 3 times a day (30 May 2018 07:13)  fluticasone 50 mcg/inh nasal spray: 1 spray(s) nasal 2 times a day for 1 week (30 May 2018 11:00)  furosemide 40 mg oral tablet: 1 tab(s) orally once a day in the morning (24 May 2018 12:34)  loratadine 10 mg oral tablet: 1 tab(s) orally once a day (24 May 2018 12:34)  Naprosyn 500 mg oral tablet: 1 tab(s) orally 2 times a day (31 Dec 2018 14:44)  oxybutynin 5 mg oral tablet: 1 tab(s) orally 2 times a day (24 May 2018 12:34)  polyethylene glycol 3350 oral powder for reconstitution: 17 gram(s) orally once a day (30 May 2018 07:13)  predniSONE 10 mg oral tablet: 1 tab(s) orally once a day (24 May 2018 12:34)  senna oral tablet: 2 tab(s) orally once a day (at bedtime) (30 May 2018 07:13)  simvastatin 20 mg oral tablet: 1 tab(s) orally once a day (at bedtime) (24 May 2018 12:34)  sodium chloride 0.65% nasal spray: 1 spray(s) nasal 4 times a day for 1 week (30 May 2018 11:00)  sulfaSALAzine 500 mg oral tablet: 1 tab(s) orally 2 times a day (30 May 2018 11:00)  tiotropium 18 mcg inhalation capsule: 1 cap(s) inhaled once a day (24 May 2018 12:34)  Toprol-XL 50 mg oral tablet, extended release: 1 tab(s) orally once a day (24 May 2018 12:34)  traMADol 300 mg/24 hours oral tablet, extended release: 1 tab(s) orally once a day (31 Dec 2018 14:42)    Allergies: No Known Allergies  Advanced Directives: Presumed Full Code   REVIEW OF SYSTEMS  [x] A ten-point review of systems was otherwise negative except as noted.  [ ] Due to altered mental status/intubation, subjective information were not able to be obtained from the patient. History was obtained, to the extent possible, from review of the chart and collateral sources of information.    CURRENT MEDICATIONS:   --------------------------------------------------------------------------------------  Neurologic Medications  acetaminophen   Tablet .. 650 milliGRAM(s) Oral every 6 hours PRN Temp greater or equal to 38C (100.4F)  gabapentin 300 milliGRAM(s) Oral three times a day  oxyCODONE    IR 15 milliGRAM(s) Oral every 3 hours PRN Severe Pain (7 - 10)  traMADol 50 milliGRAM(s) Oral every 6 hours    Respiratory Medications  ALBUTerol/ipratropium for Nebulization 3 milliLiter(s) Nebulizer every 6 hours  buDESOnide 160 MICROgram(s)/formoterol 4.5 MICROgram(s) Inhaler 2 Puff(s) Inhalation two times a day  guaiFENesin  milliGRAM(s) Oral every 12 hours PRN Cough  sodium chloride 3%  Inhalation 4 milliLiter(s) Inhalation every 6 hours    Cardiovascular Medications  metoprolol tartrate 25 milliGRAM(s) Oral two times a day    Gastrointestinal Medications  calcium carbonate    500 mG (Tums) Chewable 1 Tablet(s) Chew every 4 hours PRN Heartburn  docusate sodium 100 milliGRAM(s) Oral three times a day  pantoprazole    Tablet 40 milliGRAM(s) Oral before breakfast  polyethylene glycol 3350 17 Gram(s) Oral daily  senna 2 Tablet(s) Oral at bedtime    Genitourinary Medications    Hematologic/Oncologic Medications  heparin  Injectable 5000 Unit(s) SubCutaneous every 8 hours  influenza   Vaccine 0.5 milliLiter(s) IntraMuscular once    Antimicrobial/Immunologic Medications  oseltamivir 30 milliGRAM(s) Oral two times a day  piperacillin/tazobactam IVPB. 3.375 Gram(s) IV Intermittent every 8 hours    Endocrine/Metabolic Medications  dextrose 50% Injectable 25 milliLiter(s) IV Push once  insulin glargine Injectable (LANTUS) 10 Unit(s) SubCutaneous at bedtime  insulin lispro (HumaLOG) corrective regimen sliding scale   SubCutaneous three times a day before meals  levothyroxine 137 MICROGram(s) Oral daily  predniSONE   Tablet 10 milliGRAM(s) Oral daily  simvastatin 20 milliGRAM(s) Oral at bedtime    Topical/Other Medications  benzocaine 15 mG/menthol 3.6 mG (Sugar-Free) Lozenge 1 Lozenge Oral every 4 hours PRN Sore Throat  fluticasone propionate 50 MICROgram(s)/spray Nasal Spray 1 Spray(s) Both Nostrils two times a day  sodium chloride 0.65% Nasal 1 Spray(s) Both Nostrils every 6 hours PRN Nasal Congestion    --------------------------------------------------------------------------------------  VITAL SIGNS, INS/OUTS (last 24 hours):  --------------------------------------------------------------------------------------  ICU Vital Signs Last 24 Hrs  T(C): 37.5 (2019 10:00), Max: 38.5 (2019 17:48)  T(F): 99.5 (2019 10:00), Max: 101.3 (2019 17:48)  HR: 90 (2019 12:52) (80 - 98)  BP: 146/62 (2019 12:52) (102/82 - 146/68)  RR: 17 (2019 12:52) (17 - 28)  SpO2: 90% (2019 12:52) (88% - 97%)    I&O's Summary  EXAM: Normal, NAD, alert, oriented x 3, no focal deficits. PERRLA  ***  Respiratory: wheezing, crackles heard bilaterally  Cardiovascular: S1, S2.  Regular rate and rhythm.  Peripheral edema  ***  GI: 10 retention sutures in place, mild erythema / erosion of sutures  Tubes/Lines/Drains  ***  [x] Peripheral IV  [] Central Venous Line     	[] R	[] L	[] IJ	[] Fem	[] SC        Type:	    Date Placed:   [] Arterial Line		[] R	[] L	[] Fem	[] Rad	[] Ax	Date Placed:   [] PICC:         	[] Midline		[] Mediport           [] Urinary Catheter		Date Placed:     Extremities  Exam: Extremities warm, pink, well-perfused.      LABS  --------------------------------------------------------------------------------------  Labs:  CAPILLARY BLOOD GLUCOSE  POCT Blood Glucose.: 229 mg/dL (2019 11:48)  POCT Blood Glucose.: 103 mg/dL (2019 08:03)  POCT Blood Glucose.: 140 mg/dL (2019 05:27)  POCT Blood Glucose.: 209 mg/dL (2019 02:29)  POCT Blood Glucose.: 229 mg/dL (2019 21:49)  POCT Blood Glucose.: 185 mg/dL (2019 16:58)               7.5    8.29  )-----------( 352      ( 2019 05:45 )             24.2     145  |  107  |  18  ----------------------------<  119<H>  4.2   |  25  |  1.27    eGFR if Non : 46 mL/min (19 @ 05:45)    LFTs:   Blood Gas Arterial - Lactate: 1.9 mmol/L (19 @ 13:08)  Blood Gas Arterial - Lactate: 0.7 mmol/L (19 @ 03:25)    ABG - ( 2019 13:08 )  pH: 7.29  /  pCO2: 51    /  pO2: 56    / HCO3: 22    / Base Excess: -1.8  /  SaO2: 85.6    ABG - ( 2019 03:25 )  pH: 7.29  /  pCO2: 52    /  pO2: 79    / HCO3: 23    / Base Excess: -1.3  /  SaO2: 93.8    ABG - ( 31 Dec 2018 16:58 )  pH: 7.38  /  pCO2: 43    /  pO2: 77    / HCO3: 25    / Base Excess: 0.7   /  SaO2: 94.1      Culture - Respiratory with Gram Stain (collected 2019 13:11)  Source: SPUTUM  Preliminary Report (2019 09:39):    CULTURE IN PROGRESS, FURTHER REPORT TO FOLLOW.  --------------------------------------------------------------------------------------  IMAGING RESULTS

## 2019-01-05 NOTE — PROVIDER CONTACT NOTE (OTHER) - SITUATION
pt previously refusing 10 PM scheduled medication but pt states she is willing to take them now, currently 0013, is it ok to give them now? pt refusing heparin and colace.

## 2019-01-05 NOTE — PROGRESS NOTE ADULT - SUBJECTIVE AND OBJECTIVE BOX
S: Patient seen and examined.  Has had increasing BiPap requirements overnight, currently on 16/8, 95% O2, with O2 sat @ 91%.  Febrile to 38.5 overnight.  Denies nausea or vomiting.  Pain controlled.    O: Vital Signs  T(C): 37.5 (01-05 @ 10:00), Max: 38.5 (01-04 @ 17:48)  HR: 82 (01-05 @ 11:30) (80 - 98)  BP: 102/82 (01-05 @ 10:00) (102/82 - 146/68)  RR: 28 (01-05 @ 10:00) (17 - 28)  SpO2: 90% (01-05 @ 11:30) (88% - 97%)    Physical Exam:  General Appearance: mild acute distress, bipap in place  Neck: Supple  Resp: on bipap   CV: Pulse regular presently  Abdomen: Moderately distended, appropriate incisional tenderness, dressings replaced, suture bolsters intact, wounds hemostatic                          7.5    8.29  )-----------( 352      ( 05 Jan 2019 05:45 )             24.2   01-05    145  |  107  |  18  ----------------------------<  119<H>  4.2   |  25  |  1.27    Ca    7.6<L>      05 Jan 2019 05:45  Phos  2.6     01-04  Mg     2.4     01-04

## 2019-01-05 NOTE — PROVIDER CONTACT NOTE (OTHER) - SITUATION
pt states she is not taking all of her home pain meds while here in hospital. pt threatening to have home meds brought to her by family member if meds/ pain not rectified.

## 2019-01-06 LAB
APTT BLD: 26.5 SEC — LOW (ref 27.5–36.3)
BACTERIA BLD CULT: SIGNIFICANT CHANGE UP
BASE EXCESS BLDA CALC-SCNC: 3.5 MMOL/L — SIGNIFICANT CHANGE UP
BASE EXCESS BLDA CALC-SCNC: 5.2 MMOL/L — SIGNIFICANT CHANGE UP
BUN SERPL-MCNC: 18 MG/DL — SIGNIFICANT CHANGE UP (ref 7–23)
CA-I BLDA-SCNC: 1.13 MMOL/L — LOW (ref 1.15–1.29)
CA-I BLDA-SCNC: 1.16 MMOL/L — SIGNIFICANT CHANGE UP (ref 1.15–1.29)
CALCIUM SERPL-MCNC: 8.5 MG/DL — SIGNIFICANT CHANGE UP (ref 8.4–10.5)
CHLORIDE SERPL-SCNC: 104 MMOL/L — SIGNIFICANT CHANGE UP (ref 98–107)
CO2 SERPL-SCNC: 28 MMOL/L — SIGNIFICANT CHANGE UP (ref 22–31)
CREAT SERPL-MCNC: 1.15 MG/DL — SIGNIFICANT CHANGE UP (ref 0.5–1.3)
GLUCOSE BLDA-MCNC: 125 MG/DL — HIGH (ref 70–99)
GLUCOSE BLDC GLUCOMTR-MCNC: 186 MG/DL — HIGH (ref 70–99)
GLUCOSE BLDC GLUCOMTR-MCNC: 220 MG/DL — HIGH (ref 70–99)
GLUCOSE BLDC GLUCOMTR-MCNC: 287 MG/DL — HIGH (ref 70–99)
GLUCOSE SERPL-MCNC: 126 MG/DL — HIGH (ref 70–99)
HCO3 BLDA-SCNC: 27 MMOL/L — HIGH (ref 22–26)
HCO3 BLDA-SCNC: 28 MMOL/L — HIGH (ref 22–26)
HCT VFR BLD CALC: 24.2 % — LOW (ref 34.5–45)
HCT VFR BLD CALC: 25 % — LOW (ref 34.5–45)
HCT VFR BLDA CALC: 24.4 % — LOW (ref 34.5–46.5)
HGB BLD-MCNC: 7.4 G/DL — LOW (ref 11.5–15.5)
HGB BLD-MCNC: 7.7 G/DL — LOW (ref 11.5–15.5)
HGB BLDA-MCNC: 7.8 G/DL — LOW (ref 11.5–15.5)
INR BLD: 1.05 — SIGNIFICANT CHANGE UP (ref 0.88–1.17)
LACTATE BLDA-SCNC: 1.1 MMOL/L — SIGNIFICANT CHANGE UP (ref 0.5–2)
MAGNESIUM SERPL-MCNC: 2 MG/DL — SIGNIFICANT CHANGE UP (ref 1.6–2.6)
MCHC RBC-ENTMCNC: 30.1 PG — SIGNIFICANT CHANGE UP (ref 27–34)
MCHC RBC-ENTMCNC: 30.3 PG — SIGNIFICANT CHANGE UP (ref 27–34)
MCHC RBC-ENTMCNC: 30.6 % — LOW (ref 32–36)
MCHC RBC-ENTMCNC: 30.8 % — LOW (ref 32–36)
MCV RBC AUTO: 97.7 FL — SIGNIFICANT CHANGE UP (ref 80–100)
MCV RBC AUTO: 99.2 FL — SIGNIFICANT CHANGE UP (ref 80–100)
NRBC # FLD: 0.24 K/UL — LOW (ref 25–125)
NRBC # FLD: 0.26 K/UL — LOW (ref 25–125)
NRBC FLD-RTO: 2.9 — SIGNIFICANT CHANGE UP
NRBC FLD-RTO: 3.4 — SIGNIFICANT CHANGE UP
PCO2 BLDA: 53 MMHG — HIGH (ref 32–48)
PCO2 BLDA: 60 MMHG — HIGH (ref 32–48)
PH BLDA: 7.33 PH — LOW (ref 7.35–7.45)
PH BLDA: 7.36 PH — SIGNIFICANT CHANGE UP (ref 7.35–7.45)
PLATELET # BLD AUTO: 401 K/UL — HIGH (ref 150–400)
PLATELET # BLD AUTO: 438 K/UL — HIGH (ref 150–400)
PMV BLD: 9.1 FL — SIGNIFICANT CHANGE UP (ref 7–13)
PMV BLD: 9.1 FL — SIGNIFICANT CHANGE UP (ref 7–13)
PO2 BLDA: 50 MMHG — CRITICAL LOW (ref 83–108)
PO2 BLDA: 73 MMHG — LOW (ref 83–108)
POTASSIUM BLDA-SCNC: 3.7 MMOL/L — SIGNIFICANT CHANGE UP (ref 3.4–4.5)
POTASSIUM SERPL-MCNC: 3.8 MMOL/L — SIGNIFICANT CHANGE UP (ref 3.5–5.3)
POTASSIUM SERPL-SCNC: 3.8 MMOL/L — SIGNIFICANT CHANGE UP (ref 3.5–5.3)
PROTHROM AB SERPL-ACNC: 11.7 SEC — SIGNIFICANT CHANGE UP (ref 9.8–13.1)
RBC # BLD: 2.44 M/UL — LOW (ref 3.8–5.2)
RBC # BLD: 2.56 M/UL — LOW (ref 3.8–5.2)
RBC # FLD: 16.9 % — HIGH (ref 10.3–14.5)
RBC # FLD: 17.3 % — HIGH (ref 10.3–14.5)
SAO2 % BLDA: 81.5 % — LOW (ref 95–99)
SAO2 % BLDA: 94.3 % — LOW (ref 95–99)
SODIUM BLDA-SCNC: 140 MMOL/L — SIGNIFICANT CHANGE UP (ref 136–146)
SODIUM BLDA-SCNC: 140 MMOL/L — SIGNIFICANT CHANGE UP (ref 136–146)
SODIUM BLDA-SCNC: 142 MMOL/L — SIGNIFICANT CHANGE UP (ref 136–146)
SODIUM SERPL-SCNC: 144 MMOL/L — SIGNIFICANT CHANGE UP (ref 135–145)
WBC # BLD: 7.55 K/UL — SIGNIFICANT CHANGE UP (ref 3.8–10.5)
WBC # BLD: 8.24 K/UL — SIGNIFICANT CHANGE UP (ref 3.8–10.5)
WBC # FLD AUTO: 7.55 K/UL — SIGNIFICANT CHANGE UP (ref 3.8–10.5)
WBC # FLD AUTO: 8.24 K/UL — SIGNIFICANT CHANGE UP (ref 3.8–10.5)

## 2019-01-06 PROCEDURE — 76937 US GUIDE VASCULAR ACCESS: CPT | Mod: 26

## 2019-01-06 PROCEDURE — 71045 X-RAY EXAM CHEST 1 VIEW: CPT | Mod: 26

## 2019-01-06 PROCEDURE — 71045 X-RAY EXAM CHEST 1 VIEW: CPT | Mod: 26,77

## 2019-01-06 PROCEDURE — 36556 INSERT NON-TUNNEL CV CATH: CPT

## 2019-01-06 PROCEDURE — 99291 CRITICAL CARE FIRST HOUR: CPT

## 2019-01-06 RX ORDER — SODIUM CHLORIDE 9 MG/ML
1000 INJECTION, SOLUTION INTRAVENOUS
Qty: 0 | Refills: 0 | Status: DISCONTINUED | OUTPATIENT
Start: 2019-01-06 | End: 2019-01-07

## 2019-01-06 RX ORDER — ETOMIDATE 2 MG/ML
20 INJECTION INTRAVENOUS ONCE
Qty: 0 | Refills: 0 | Status: COMPLETED | OUTPATIENT
Start: 2019-01-06 | End: 2019-01-06

## 2019-01-06 RX ORDER — ALBUTEROL 90 UG/1
1 AEROSOL, METERED ORAL EVERY 4 HOURS
Qty: 0 | Refills: 0 | Status: DISCONTINUED | OUTPATIENT
Start: 2019-01-06 | End: 2019-01-06

## 2019-01-06 RX ORDER — ALBUTEROL 90 UG/1
1 AEROSOL, METERED ORAL EVERY 4 HOURS
Qty: 0 | Refills: 0 | Status: COMPLETED | OUTPATIENT
Start: 2019-01-06 | End: 2019-12-05

## 2019-01-06 RX ORDER — FENTANYL CITRATE 50 UG/ML
2 INJECTION INTRAVENOUS
Qty: 2500 | Refills: 0 | Status: DISCONTINUED | OUTPATIENT
Start: 2019-01-06 | End: 2019-01-08

## 2019-01-06 RX ORDER — NOREPINEPHRINE BITARTRATE/D5W 8 MG/250ML
0.05 PLASTIC BAG, INJECTION (ML) INTRAVENOUS
Qty: 8 | Refills: 0 | Status: DISCONTINUED | OUTPATIENT
Start: 2019-01-06 | End: 2019-01-07

## 2019-01-06 RX ORDER — SUCCINYLCHOLINE CHLORIDE 100 MG/5ML
200 SYRINGE (ML) INTRAVENOUS ONCE
Qty: 0 | Refills: 0 | Status: COMPLETED | OUTPATIENT
Start: 2019-01-06 | End: 2019-01-06

## 2019-01-06 RX ORDER — INSULIN LISPRO 100/ML
VIAL (ML) SUBCUTANEOUS EVERY 6 HOURS
Qty: 0 | Refills: 0 | Status: DISCONTINUED | OUTPATIENT
Start: 2019-01-06 | End: 2019-01-07

## 2019-01-06 RX ORDER — DEXTROSE 50 % IN WATER 50 %
25 SYRINGE (ML) INTRAVENOUS ONCE
Qty: 0 | Refills: 0 | Status: DISCONTINUED | OUTPATIENT
Start: 2019-01-06 | End: 2019-01-07

## 2019-01-06 RX ORDER — SODIUM CHLORIDE 9 MG/ML
4 INJECTION INTRAMUSCULAR; INTRAVENOUS; SUBCUTANEOUS EVERY 4 HOURS
Qty: 0 | Refills: 0 | Status: DISCONTINUED | OUTPATIENT
Start: 2019-01-06 | End: 2019-01-06

## 2019-01-06 RX ORDER — ALBUTEROL 90 UG/1
1 AEROSOL, METERED ORAL EVERY 6 HOURS
Qty: 0 | Refills: 0 | Status: DISCONTINUED | OUTPATIENT
Start: 2019-01-06 | End: 2019-01-24

## 2019-01-06 RX ORDER — DEXTROSE 50 % IN WATER 50 %
15 SYRINGE (ML) INTRAVENOUS ONCE
Qty: 0 | Refills: 0 | Status: DISCONTINUED | OUTPATIENT
Start: 2019-01-06 | End: 2019-01-07

## 2019-01-06 RX ORDER — HYDROCORTISONE 20 MG
50 TABLET ORAL EVERY 6 HOURS
Qty: 0 | Refills: 0 | Status: DISCONTINUED | OUTPATIENT
Start: 2019-01-06 | End: 2019-01-08

## 2019-01-06 RX ORDER — DEXMEDETOMIDINE HYDROCHLORIDE IN 0.9% SODIUM CHLORIDE 4 UG/ML
0.2 INJECTION INTRAVENOUS
Qty: 200 | Refills: 0 | Status: DISCONTINUED | OUTPATIENT
Start: 2019-01-06 | End: 2019-01-08

## 2019-01-06 RX ORDER — SIMVASTATIN 20 MG/1
20 TABLET, FILM COATED ORAL AT BEDTIME
Qty: 0 | Refills: 0 | Status: DISCONTINUED | OUTPATIENT
Start: 2019-01-06 | End: 2019-02-07

## 2019-01-06 RX ORDER — LEVOTHYROXINE SODIUM 125 MCG
68.5 TABLET ORAL AT BEDTIME
Qty: 0 | Refills: 0 | Status: DISCONTINUED | OUTPATIENT
Start: 2019-01-06 | End: 2019-01-08

## 2019-01-06 RX ORDER — PANTOPRAZOLE SODIUM 20 MG/1
40 TABLET, DELAYED RELEASE ORAL DAILY
Qty: 0 | Refills: 0 | Status: DISCONTINUED | OUTPATIENT
Start: 2019-01-06 | End: 2019-01-10

## 2019-01-06 RX ORDER — SODIUM CHLORIDE 9 MG/ML
4 INJECTION INTRAMUSCULAR; INTRAVENOUS; SUBCUTANEOUS EVERY 6 HOURS
Qty: 0 | Refills: 0 | Status: DISCONTINUED | OUTPATIENT
Start: 2019-01-06 | End: 2019-01-15

## 2019-01-06 RX ORDER — POTASSIUM CHLORIDE 20 MEQ
10 PACKET (EA) ORAL ONCE
Qty: 0 | Refills: 0 | Status: COMPLETED | OUTPATIENT
Start: 2019-01-06 | End: 2019-01-06

## 2019-01-06 RX ORDER — GLUCAGON INJECTION, SOLUTION 0.5 MG/.1ML
1 INJECTION, SOLUTION SUBCUTANEOUS ONCE
Qty: 0 | Refills: 0 | Status: DISCONTINUED | OUTPATIENT
Start: 2019-01-06 | End: 2019-01-07

## 2019-01-06 RX ORDER — CALCIUM GLUCONATE 100 MG/ML
1 VIAL (ML) INTRAVENOUS ONCE
Qty: 0 | Refills: 0 | Status: COMPLETED | OUTPATIENT
Start: 2019-01-06 | End: 2019-01-06

## 2019-01-06 RX ORDER — DEXTROSE 50 % IN WATER 50 %
12.5 SYRINGE (ML) INTRAVENOUS ONCE
Qty: 0 | Refills: 0 | Status: DISCONTINUED | OUTPATIENT
Start: 2019-01-06 | End: 2019-01-07

## 2019-01-06 RX ADMIN — ETOMIDATE 20 MILLIGRAM(S): 2 INJECTION INTRAVENOUS at 06:27

## 2019-01-06 RX ADMIN — FENTANYL CITRATE 5.61 MICROGRAM(S)/KG/HR: 50 INJECTION INTRAVENOUS at 06:27

## 2019-01-06 RX ADMIN — TRAMADOL HYDROCHLORIDE 50 MILLIGRAM(S): 50 TABLET ORAL at 01:40

## 2019-01-06 RX ADMIN — SODIUM CHLORIDE 4 MILLILITER(S): 9 INJECTION INTRAMUSCULAR; INTRAVENOUS; SUBCUTANEOUS at 09:56

## 2019-01-06 RX ADMIN — SODIUM CHLORIDE 4 MILLILITER(S): 9 INJECTION INTRAMUSCULAR; INTRAVENOUS; SUBCUTANEOUS at 21:55

## 2019-01-06 RX ADMIN — HEPARIN SODIUM 5000 UNIT(S): 5000 INJECTION INTRAVENOUS; SUBCUTANEOUS at 06:25

## 2019-01-06 RX ADMIN — Medication 200 GRAM(S): at 10:16

## 2019-01-06 RX ADMIN — DEXMEDETOMIDINE HYDROCHLORIDE IN 0.9% SODIUM CHLORIDE 5.61 MICROGRAM(S)/KG/HR: 4 INJECTION INTRAVENOUS at 23:12

## 2019-01-06 RX ADMIN — PANTOPRAZOLE SODIUM 40 MILLIGRAM(S): 20 TABLET, DELAYED RELEASE ORAL at 11:50

## 2019-01-06 RX ADMIN — Medication 1 SPRAY(S): at 06:25

## 2019-01-06 RX ADMIN — PIPERACILLIN AND TAZOBACTAM 25 GRAM(S): 4; .5 INJECTION, POWDER, LYOPHILIZED, FOR SOLUTION INTRAVENOUS at 01:04

## 2019-01-06 RX ADMIN — Medication 68.5 MICROGRAM(S): at 23:25

## 2019-01-06 RX ADMIN — TRAMADOL HYDROCHLORIDE 50 MILLIGRAM(S): 50 TABLET ORAL at 00:53

## 2019-01-06 RX ADMIN — DEXMEDETOMIDINE HYDROCHLORIDE IN 0.9% SODIUM CHLORIDE 5.61 MICROGRAM(S)/KG/HR: 4 INJECTION INTRAVENOUS at 09:09

## 2019-01-06 RX ADMIN — Medication 50 MILLIGRAM(S): at 17:38

## 2019-01-06 RX ADMIN — SIMVASTATIN 20 MILLIGRAM(S): 20 TABLET, FILM COATED ORAL at 23:12

## 2019-01-06 RX ADMIN — Medication 4: at 17:38

## 2019-01-06 RX ADMIN — HEPARIN SODIUM 5000 UNIT(S): 5000 INJECTION INTRAVENOUS; SUBCUTANEOUS at 23:12

## 2019-01-06 RX ADMIN — SODIUM CHLORIDE 4 MILLILITER(S): 9 INJECTION INTRAMUSCULAR; INTRAVENOUS; SUBCUTANEOUS at 16:39

## 2019-01-06 RX ADMIN — Medication 100 MILLIEQUIVALENT(S): at 06:33

## 2019-01-06 RX ADMIN — DEXMEDETOMIDINE HYDROCHLORIDE IN 0.9% SODIUM CHLORIDE 5.61 MICROGRAM(S)/KG/HR: 4 INJECTION INTRAVENOUS at 06:27

## 2019-01-06 RX ADMIN — INSULIN GLARGINE 10 UNIT(S): 100 INJECTION, SOLUTION SUBCUTANEOUS at 23:13

## 2019-01-06 RX ADMIN — BUDESONIDE AND FORMOTEROL FUMARATE DIHYDRATE 2 PUFF(S): 160; 4.5 AEROSOL RESPIRATORY (INHALATION) at 09:56

## 2019-01-06 RX ADMIN — Medication 3 MILLILITER(S): at 04:00

## 2019-01-06 RX ADMIN — Medication 15 MILLIGRAM(S): at 01:41

## 2019-01-06 RX ADMIN — FENTANYL CITRATE 5.61 MICROGRAM(S)/KG/HR: 50 INJECTION INTRAVENOUS at 09:09

## 2019-01-06 RX ADMIN — Medication 50 MILLIGRAM(S): at 12:47

## 2019-01-06 RX ADMIN — Medication 6: at 23:25

## 2019-01-06 RX ADMIN — ALBUTEROL 1 PUFF(S): 90 AEROSOL, METERED ORAL at 16:32

## 2019-01-06 RX ADMIN — PIPERACILLIN AND TAZOBACTAM 25 GRAM(S): 4; .5 INJECTION, POWDER, LYOPHILIZED, FOR SOLUTION INTRAVENOUS at 17:39

## 2019-01-06 RX ADMIN — BUDESONIDE AND FORMOTEROL FUMARATE DIHYDRATE 2 PUFF(S): 160; 4.5 AEROSOL RESPIRATORY (INHALATION) at 21:50

## 2019-01-06 RX ADMIN — Medication 30 MILLIGRAM(S): at 17:39

## 2019-01-06 RX ADMIN — HEPARIN SODIUM 5000 UNIT(S): 5000 INJECTION INTRAVENOUS; SUBCUTANEOUS at 13:06

## 2019-01-06 RX ADMIN — Medication 2: at 13:06

## 2019-01-06 RX ADMIN — Medication 3 MILLILITER(S): at 09:56

## 2019-01-06 RX ADMIN — Medication 15 MILLIGRAM(S): at 02:20

## 2019-01-06 RX ADMIN — FENTANYL CITRATE 22.44 MICROGRAM(S)/KG/HR: 50 INJECTION INTRAVENOUS at 23:12

## 2019-01-06 RX ADMIN — Medication 10.52 MICROGRAM(S)/KG/MIN: at 09:10

## 2019-01-06 RX ADMIN — Medication 200 MILLIGRAM(S): at 06:27

## 2019-01-06 RX ADMIN — PIPERACILLIN AND TAZOBACTAM 25 GRAM(S): 4; .5 INJECTION, POWDER, LYOPHILIZED, FOR SOLUTION INTRAVENOUS at 09:09

## 2019-01-06 RX ADMIN — FENTANYL CITRATE 2 MICROGRAM(S)/KG/HR: 50 INJECTION INTRAVENOUS at 23:13

## 2019-01-06 RX ADMIN — ALBUTEROL 1 PUFF(S): 90 AEROSOL, METERED ORAL at 21:45

## 2019-01-06 NOTE — PROGRESS NOTE ADULT - ASSESSMENT
ASSESSMENT:  63y Female ***    PLAN:   Neurologic:   Respiratory:   Cardiovascular:   Gastrointestinal/Nutrition:   Renal/Genitourinary:   Hematologic:   Infectious Disease:   Lines/Tubes:  Endocrine:   Disposition:     --------------------------------------------------------------------------------------    Critical Care Diagnoses: #neuro  - pain ctrl prn    #resp  - hx COPD and pulm HTN, current smoker  - c/w Proventil, Symbicort, Spiriva  - continue mucinex  - Pt requiring continuous bipap, satting at 91% on 16/8, 95% FiO2  - trend ABG, intubate if resp status declines on bipap    #cv  - off pressors post-op  - HR 90s, bp 140s/70s  - hx HTN  - also takes lasix at home, held recently per pulm recs given pt w/o current signs pulm edema  - increase to metoprolol 7.5 q6  - monitor uop, lactate    #GI  - s/p ex-lap  for evisceration, retention sutures placed  - await GI function  - c/w protonix daily  - Advanced to soft diet with ensure    #renal  - hx CKD  - BUN/Crt wnl  - monitor I's&O's  - repletions PRN    #heme  - transfused 1/3. will follow cbc  - sqh for dvt ppx    #ID  - trend fevers    #endo  - hx RA on chronic prednisone, DM  - continue solu-cortef  - vitamin A to assist wound healing  - c/w lantus 10, ISS  - continue synthroid, endo following    #dispo  -SICU    Critical Care Dx:  - Respiratory failure  - CHRIS  - Morbid obesity  - Evisceration  - Severe protein/calorie malnutrition  - Pulmonary HTN  - COPD  - Adrenal insufficiency

## 2019-01-06 NOTE — PROCEDURE NOTE - PROCEDURE
<<-----Click on this checkbox to enter Procedure Arterial puncture by physician  01/06/2019    Active  [id number]

## 2019-01-06 NOTE — PROGRESS NOTE ADULT - SUBJECTIVE AND OBJECTIVE BOX
Surgery Progress Note    S: Patient seen and examined. Patient continuing to have increasing BiPAP requirements in RCU. Concern for respiratory status, patient was transferred to ICU. This morning patient was intubated, currently on vent. Not currently requiring pressors.     O:  Vital Signs Last 24 Hrs  T(C): 36.5 (06 Jan 2019 04:00), Max: 38 (05 Jan 2019 14:35)  T(F): 97.7 (06 Jan 2019 04:00), Max: 100.4 (05 Jan 2019 14:35)  HR: 82 (06 Jan 2019 07:30) (76 - 102)  BP: 79/52 (06 Jan 2019 07:30) (75/48 - 169/95)  BP(mean): 58 (06 Jan 2019 07:30) (55 - 116)  RR: 17 (06 Jan 2019 07:30) (12 - 30)  SpO2: 97% (06 Jan 2019 07:30) (87% - 99%)    I&O's Detail    05 Jan 2019 07:01  -  06 Jan 2019 07:00  --------------------------------------------------------  IN:    dexmedetomidine Infusion: 52.8 mL    fentaNYL  Infusion: 44.8 mL    IV PiggyBack: 200 mL    Oral Fluid: 30 mL  Total IN: 327.6 mL    OUT:    Indwelling Catheter - Urethral: 60 mL    Voided: 800 mL  Total OUT: 860 mL    Total NET: -532.4 mL          MEDICATIONS  (STANDING):  ALBUTerol/ipratropium for Nebulization 3 milliLiter(s) Nebulizer every 6 hours  buDESOnide 160 MICROgram(s)/formoterol 4.5 MICROgram(s) Inhaler 2 Puff(s) Inhalation two times a day  calcium gluconate IVPB 1 Gram(s) IV Intermittent once  dexmedetomidine Infusion 0.2 MICROgram(s)/kG/Hr (5.61 mL/Hr) IV Continuous <Continuous>  dextrose 50% Injectable 25 milliLiter(s) IV Push once  fentaNYL   Infusion 0.5 MICROgram(s)/kG/Hr (5.61 mL/Hr) IV Continuous <Continuous>  fluticasone propionate 50 MICROgram(s)/spray Nasal Spray 1 Spray(s) Both Nostrils two times a day  heparin  Injectable 5000 Unit(s) SubCutaneous every 8 hours  influenza   Vaccine 0.5 milliLiter(s) IntraMuscular once  insulin glargine Injectable (LANTUS) 10 Unit(s) SubCutaneous at bedtime  levothyroxine Injectable 68.5 MICROGram(s) IV Push at bedtime  oseltamivir 30 milliGRAM(s) Oral two times a day  pantoprazole  Injectable 40 milliGRAM(s) IV Push daily  piperacillin/tazobactam IVPB. 3.375 Gram(s) IV Intermittent every 8 hours  predniSONE   Tablet 10 milliGRAM(s) Oral daily  simvastatin 20 milliGRAM(s) Oral at bedtime  sodium chloride 3%  Inhalation 4 milliLiter(s) Inhalation every 6 hours    MEDICATIONS  (PRN):  calcium carbonate    500 mG (Tums) Chewable 1 Tablet(s) Chew every 4 hours PRN Heartburn  oxyCODONE    IR 15 milliGRAM(s) Oral every 3 hours PRN Severe Pain (7 - 10)  sodium chloride 0.65% Nasal 1 Spray(s) Both Nostrils every 6 hours PRN Nasal Congestion                            7.7    8.24  )-----------( 438      ( 06 Jan 2019 03:20 )             25.0       01-06    144  |  104  |  18  ----------------------------<  126<H>  3.8   |  28  |  1.15    Ca    8.5      06 Jan 2019 03:20  Mg     2.0     01-06        Physical Exam:  Gen: Laying in bed, intubated, sedated   Resp: intubated on vent  Abd: soft, NT, mildly distended, midline incision w/ rentention sutures in place c/d/i   Ext: WWP  Skin: No rashes

## 2019-01-06 NOTE — PROGRESS NOTE ADULT - ATTENDING COMMENTS
The patient is a critical care patient with hemodynamic and metabolic instability in SICU.  I have personally interviewed and examined this patient, reviewed labs and x-rays, discussed with other consultants, House staff and PA's.  I spent   35  minutes  in total providing critical care for the diagnoses, assessment and plan above.  These diagnoses are unrelated to the surgical procedure noted above.  Time involved in performance of separately billable procedures was not counted toward my critical care time.  There is no overlap.    Current Issues:  J96.01 Acute respiratory failure with hypoxia and hypercapnia   - resp status continued to deteriorate overnight.  Etiology multifactorial, including influenza superimposed on chronic severe COPD.  Intubated and begun on mechanical ventilation.  Continuing current management otherwise, although not diuresing given her need for pressors  I95.9 Hypotension, unspecified hypotension type   - on small dose pressors (levo at 0.01 mcg/kg/min).  Not bolusing IVFs given her already severe hypoxemia and concern for further resp compromise with further fluids  - will also increase steroids to stress dose  J18.9 Multifocal pneumonia   - on zosyn and tamiflu  T81.30XA Wound dehiscence   - close monitoring  E11.9 Type 2 diabetes mellitus without complication, unspecified whether long term insulin use   - on RISS  E03.9 Hypothyroidism, unspecified type   - on synthroid  M06.9 Rheumatoid arthritis, involving unspecified site, unspecified rheumatoid factor presence   - on chronic steroids  D64.9 Anemia, unspecified type   - no signs of clinically significant bleeding on exam  Z91.89 At risk for malnutrition   - starting TFs

## 2019-01-06 NOTE — PROGRESS NOTE ADULT - ASSESSMENT
63F w/ multiple respiratory comorbidities p/w SBO s/p ex-lap with findings of healthy bowel c/b evisceration of bowel s/p ex-lap with placement of retention sutures with increasing respiratory demand on Bipap.      - DVT ppx  - Cont abx for multifocal pna   - F/U blood cx  - monitor GI fxn   - monitor respiratory status   - management per SICU team     Fatoumata Danielle, PGY-1  [facility] B Team [id number]

## 2019-01-06 NOTE — PROGRESS NOTE ADULT - SUBJECTIVE AND OBJECTIVE BOX
SICU Consultation Note  =====================================================  63 year old woman with COPD on home O2 (3L), moderate pulmonary HTN, DM, RA on prednisone (10 mg daily x 30 years), HTN, s/p CVA (no residual deficit), s/p  x2 presents with abdominal pain for one day.  She developed mid abdominal cramping pain, sharp, last evening that has been constant and moderate to severe.  It was associated with multiple episodes of nonbloody, nonbilious emesis.  She had a normal bowel movement this morning and has not passed flatus today.      CT scan shows SBO with transition point in the lower abdomen with associated interloop fluid and mesenteric edema. (15 Dec 2018 23:33)      Surgery Information  OR time:      EBL:          IV Fluids:       Blood Products:   UOP:          PAST MEDICAL & SURGICAL HISTORY:  Diverticulosis  CVA (Cerebral Vascular Accident)  RA (Rheumatoid Arthritis)  Tooth Abscess  Arthritis  Cigarette Smoker  Chronic Asthma  Adult Hypothyroidism  Borderline Diabetes Mellitus  High Cholesterol  H/O: HTN  Wrist Disorder: S/P Surgery  History of  Section    Home Meds: Home Medications:  acetaminophen 325 mg oral tablet: 2 tab(s) orally every 6 hours as needed (30 May 2018 11:00)  alendronate: 1 tab(s) orally once a week (24 May 2018 12:34)  amLODIPine 5 mg oral tablet: 1 tab(s) orally once a day (24 May 2018 12:34)  clopidogrel 75 mg oral tablet: 1 tab(s) orally once a day (24 May 2018 12:34)  docusate sodium 100 mg oral capsule: 1 cap(s) orally 3 times a day (30 May 2018 07:13)  fluticasone 50 mcg/inh nasal spray: 1 spray(s) nasal 2 times a day for 1 week (30 May 2018 11:00)  furosemide 40 mg oral tablet: 1 tab(s) orally once a day in the morning (24 May 2018 12:34)  loratadine 10 mg oral tablet: 1 tab(s) orally once a day (24 May 2018 12:34)  Naprosyn 500 mg oral tablet: 1 tab(s) orally 2 times a day (31 Dec 2018 14:44)  oxybutynin 5 mg oral tablet: 1 tab(s) orally 2 times a day (24 May 2018 12:34)  polyethylene glycol 3350 oral powder for reconstitution: 17 gram(s) orally once a day (30 May 2018 07:13)  predniSONE 10 mg oral tablet: 1 tab(s) orally once a day (24 May 2018 12:34)  senna oral tablet: 2 tab(s) orally once a day (at bedtime) (30 May 2018 07:13)  simvastatin 20 mg oral tablet: 1 tab(s) orally once a day (at bedtime) (24 May 2018 12:34)  sodium chloride 0.65% nasal spray: 1 spray(s) nasal 4 times a day for 1 week (30 May 2018 11:00)  sulfaSALAzine 500 mg oral tablet: 1 tab(s) orally 2 times a day (30 May 2018 11:00)  tiotropium 18 mcg inhalation capsule: 1 cap(s) inhaled once a day (24 May 2018 12:34)  Toprol-XL 50 mg oral tablet, extended release: 1 tab(s) orally once a day (24 May 2018 12:34)  traMADol 300 mg/24 hours oral tablet, extended release: 1 tab(s) orally once a day (31 Dec 2018 14:42)    Allergies: Allergies    No Known Allergies    Intolerances      Soc:   Advanced Directives: Presumed Full Code     ROS:    REVIEW OF SYSTEMS    [ ] A ten-point review of systems was otherwise negative except as noted.  [ ] Due to altered mental status/intubation, subjective information were not able to be obtained from the patient. History was obtained, to the extent possible, from review of the chart and collateral sources of information.      CURRENT MEDICATIONS:   --------------------------------------------------------------------------------------  Neurologic Medications  acetaminophen  IVPB .. 1000 milliGRAM(s) IV Intermittent once  gabapentin 300 milliGRAM(s) Oral three times a day  ketorolac   Injectable 15 milliGRAM(s) IV Push every 6 hours PRN Moderate Pain (4 - 6)  oxyCODONE    IR 15 milliGRAM(s) Oral every 3 hours PRN Severe Pain (7 - 10)  traMADol 50 milliGRAM(s) Oral every 6 hours    Respiratory Medications  ALBUTerol/ipratropium for Nebulization 3 milliLiter(s) Nebulizer every 6 hours  buDESOnide 160 MICROgram(s)/formoterol 4.5 MICROgram(s) Inhaler 2 Puff(s) Inhalation two times a day  guaiFENesin  milliGRAM(s) Oral every 12 hours PRN Cough  sodium chloride 3%  Inhalation 4 milliLiter(s) Inhalation every 6 hours    Cardiovascular Medications  metoprolol tartrate 25 milliGRAM(s) Oral two times a day    Gastrointestinal Medications  calcium carbonate    500 mG (Tums) Chewable 1 Tablet(s) Chew every 4 hours PRN Heartburn  docusate sodium 100 milliGRAM(s) Oral three times a day  pantoprazole    Tablet 40 milliGRAM(s) Oral before breakfast  polyethylene glycol 3350 17 Gram(s) Oral daily  senna 2 Tablet(s) Oral at bedtime    Genitourinary Medications    Hematologic/Oncologic Medications  heparin  Injectable 5000 Unit(s) SubCutaneous every 8 hours  influenza   Vaccine 0.5 milliLiter(s) IntraMuscular once    Antimicrobial/Immunologic Medications  oseltamivir 30 milliGRAM(s) Oral two times a day  piperacillin/tazobactam IVPB. 3.375 Gram(s) IV Intermittent every 8 hours    Endocrine/Metabolic Medications  dextrose 50% Injectable 25 milliLiter(s) IV Push once  insulin glargine Injectable (LANTUS) 10 Unit(s) SubCutaneous at bedtime  insulin lispro (HumaLOG) corrective regimen sliding scale   SubCutaneous three times a day before meals  levothyroxine 137 MICROGram(s) Oral daily  predniSONE   Tablet 10 milliGRAM(s) Oral daily  simvastatin 20 milliGRAM(s) Oral at bedtime    Topical/Other Medications  benzocaine 15 mG/menthol 3.6 mG (Sugar-Free) Lozenge 1 Lozenge Oral every 4 hours PRN Sore Throat  fluticasone propionate 50 MICROgram(s)/spray Nasal Spray 1 Spray(s) Both Nostrils two times a day  sodium chloride 0.65% Nasal 1 Spray(s) Both Nostrils every 6 hours PRN Nasal Congestion    --------------------------------------------------------------------------------------    VITAL SIGNS, INS/OUTS (last 24 hours):  --------------------------------------------------------------------------------------  ICU Vital Signs Last 24 Hrs  T(C): 36.6 (2019 00:00), Max: 38 (2019 14:35)  T(F): 97.8 (2019 00:00), Max: 100.4 (2019 14:35)  HR: 85 (2019 01:05) (78 - 102)  BP: 130/69 (2019 01:05) (97/64 - 146/85)  BP(mean): 79 (2019 01:05) (71 - 116)  ABP: --  ABP(mean): --  RR: 17 (2019 01:05) (14 - 28)  SpO2: 96% (2019 01:05) (87% - 97%)    I&O's Summary    2019 07:01  -  2019 01:14  --------------------------------------------------------  IN: 230 mL / OUT: 600 mL / NET: -370 mL      --------------------------------------------------------------------------------------    EXAM:  General/Neuro  RASS:   GCS:   Exam: Normal, NAD, alert, oriented x 3, no focal deficits. PERRLA  ***    Respiratory  Exam: Lungs clear to auscultation, Normal expansion/effort.  ***  [] Tracheostomy   [] Intubated  Mechanical Ventilation:     Cardiovascular  Exam: S1, S2.  Regular rate and rhythm.  Peripheral edema  ***  Cardiac Rhythm: Normal Sinus Rhythm  ECHO:     GI  Exam: Abdomen soft, Non-tender, Non-distended.  Gastrostomy / Jejunostomy tube in place.  Nasogastric tube in place.  Colostomy / Ileostomy.  ***  Wound:   ***  Current Diet:  NPO***      Tubes/Lines/Drains  ***  [x] Peripheral IV  [] Central Venous Line     	[] R	[] L	[] IJ	[] Fem	[] SC        Type:	    Date Placed:   [] Arterial Line		[] R	[] L	[] Fem	[] Rad	[] Ax	Date Placed:   [] PICC:         	[] Midline		[] Mediport           [] Urinary Catheter		Date Placed:     Extremities  Exam: Extremities warm, pink, well-perfused.        Derm:  Exam: Good skin turgor, no skin breakdown.      :   Exam: Antoine catheter in place.     LABS  --------------------------------------------------------------------------------------  Labs:  CAPILLARY BLOOD GLUCOSE      POCT Blood Glucose.: 158 mg/dL (2019 22:50)  POCT Blood Glucose.: 192 mg/dL (2019 17:20)  POCT Blood Glucose.: 229 mg/dL (2019 11:48)  POCT Blood Glucose.: 103 mg/dL (2019 08:03)  POCT Blood Glucose.: 140 mg/dL (2019 05:27)  POCT Blood Glucose.: 209 mg/dL (2019 02:29)                          7.5    8.29  )-----------( 352      ( 2019 05:45 )             24.2             145  |  107  |  18  ----------------------------<  119<H>  4.2   |  25  |  1.27    eGFR if Non : 46 mL/min (19 @ 05:45)      LFTs:     Blood Gas Arterial - Lactate: 0.9 mmol/L (19 @ 16:20)  Blood Gas Arterial - Lactate: 1.9 mmol/L (19 @ 13:08)  Blood Gas Arterial - Lactate: 0.7 mmol/L (19 @ 03:25)    ABG - ( 2019 16:20 )  pH: 7.39  /  pCO2: 52    /  pO2: 62    / HCO3: 29    / Base Excess: 5.9   /  SaO2: 91.0    ABG - ( 2019 13:08 )  pH: 7.29  /  pCO2: 51    /  pO2: 56    / HCO3: 22    / Base Excess: -1.8  /  SaO2: 85.6    ABG - ( 2019 03:25 )  pH: 7.29  /  pCO2: 52    /  pO2: 79    / HCO3: 23    / Base Excess: -1.3  /  SaO2: 93.8      Coags:  --------------------------------------------------------------------------------------  OTHER LABS    IMAGING RESULTS SICU Consultation Note  =====================================================  63 year old woman with COPD on home O2 (3L), moderate pulmonary HTN, DM, RA on prednisone (10 mg daily x 30 years), HTN, s/p CVA (no residual deficit), s/p  x2 presents with abdominal pain for one day.  She developed mid abdominal cramping pain, sharp, last evening that has been constant and moderate to severe.  It was associated with multiple episodes of nonbloody, nonbilious emesis.  She had a normal bowel movement this morning and has not passed flatus today.  CT scan shows SBO with transition point in the lower abdomen with associated interloop fluid and mesenteric edema. (15 Dec 2018 23:33)  She underwent surgery with 4 retention sutures, evicerated on  when coughing, needing 6 more retention sutures. She then had multiple febrile episodes and underwent CT c/a/p which showed multifocal pneumonia. SHe has been on bipap intermittently during the sicu stay but was stepped down to floors on 1/3. She was then put on bipap on RCU. Consulted SICU for worsening resp status despite continuous bipap  with fevers. +flu with pneumonia.     PAST MEDICAL & SURGICAL HISTORY:  Diverticulosis  CVA (Cerebral Vascular Accident)  RA (Rheumatoid Arthritis)  Tooth Abscess  Arthritis  Cigarette Smoker  Chronic Asthma  Adult Hypothyroidism  Borderline Diabetes Mellitus  High Cholesterol  H/O: HTN  Wrist Disorder: S/P Surgery  History of  Section    Home Meds: Home Medications:  acetaminophen 325 mg oral tablet: 2 tab(s) orally every 6 hours as needed (30 May 2018 11:00)  alendronate: 1 tab(s) orally once a week (24 May 2018 12:34)  amLODIPine 5 mg oral tablet: 1 tab(s) orally once a day (24 May 2018 12:34)  clopidogrel 75 mg oral tablet: 1 tab(s) orally once a day (24 May 2018 12:34)  docusate sodium 100 mg oral capsule: 1 cap(s) orally 3 times a day (30 May 2018 07:13)  fluticasone 50 mcg/inh nasal spray: 1 spray(s) nasal 2 times a day for 1 week (30 May 2018 11:00)  furosemide 40 mg oral tablet: 1 tab(s) orally once a day in the morning (24 May 2018 12:34)  loratadine 10 mg oral tablet: 1 tab(s) orally once a day (24 May 2018 12:34)  Naprosyn 500 mg oral tablet: 1 tab(s) orally 2 times a day (31 Dec 2018 14:44)  oxybutynin 5 mg oral tablet: 1 tab(s) orally 2 times a day (24 May 2018 12:34)  polyethylene glycol 3350 oral powder for reconstitution: 17 gram(s) orally once a day (30 May 2018 07:13)  predniSONE 10 mg oral tablet: 1 tab(s) orally once a day (24 May 2018 12:34)  senna oral tablet: 2 tab(s) orally once a day (at bedtime) (30 May 2018 07:13)  simvastatin 20 mg oral tablet: 1 tab(s) orally once a day (at bedtime) (24 May 2018 12:34)  sodium chloride 0.65% nasal spray: 1 spray(s) nasal 4 times a day for 1 week (30 May 2018 11:00)  sulfaSALAzine 500 mg oral tablet: 1 tab(s) orally 2 times a day (30 May 2018 11:00)  tiotropium 18 mcg inhalation capsule: 1 cap(s) inhaled once a day (24 May 2018 12:34)  Toprol-XL 50 mg oral tablet, extended release: 1 tab(s) orally once a day (24 May 2018 12:34)  traMADol 300 mg/24 hours oral tablet, extended release: 1 tab(s) orally once a day (31 Dec 2018 14:42)    Allergies: Allergies  No Known Allergies  Soc:   Advanced Directives: Presumed Full Code     ROS:    REVIEW OF SYSTEMS  [x] A ten-point review of systems was otherwise negative except as noted.  [ ] Due to altered mental status/intubation, subjective information were not able to be obtained from the patient. History was obtained, to the extent possible, from review of the chart and collateral sources of information.    CURRENT MEDICATIONS:   --------------------------------------------------------------------------------------  Neurologic Medications  acetaminophen  IVPB .. 1000 milliGRAM(s) IV Intermittent once  gabapentin 300 milliGRAM(s) Oral three times a day  ketorolac   Injectable 15 milliGRAM(s) IV Push every 6 hours PRN Moderate Pain (4 - 6)  oxyCODONE    IR 15 milliGRAM(s) Oral every 3 hours PRN Severe Pain (7 - 10)  traMADol 50 milliGRAM(s) Oral every 6 hours    Respiratory Medications  ALBUTerol/ipratropium for Nebulization 3 milliLiter(s) Nebulizer every 6 hours  buDESOnide 160 MICROgram(s)/formoterol 4.5 MICROgram(s) Inhaler 2 Puff(s) Inhalation two times a day  guaiFENesin  milliGRAM(s) Oral every 12 hours PRN Cough  sodium chloride 3%  Inhalation 4 milliLiter(s) Inhalation every 6 hours    Cardiovascular Medications  metoprolol tartrate 25 milliGRAM(s) Oral two times a day    Gastrointestinal Medications  calcium carbonate    500 mG (Tums) Chewable 1 Tablet(s) Chew every 4 hours PRN Heartburn  docusate sodium 100 milliGRAM(s) Oral three times a day  pantoprazole    Tablet 40 milliGRAM(s) Oral before breakfast  polyethylene glycol 3350 17 Gram(s) Oral daily  senna 2 Tablet(s) Oral at bedtime    Genitourinary Medications    Hematologic/Oncologic Medications  heparin  Injectable 5000 Unit(s) SubCutaneous every 8 hours  influenza   Vaccine 0.5 milliLiter(s) IntraMuscular once    Antimicrobial/Immunologic Medications  oseltamivir 30 milliGRAM(s) Oral two times a day  piperacillin/tazobactam IVPB. 3.375 Gram(s) IV Intermittent every 8 hours    Endocrine/Metabolic Medications  dextrose 50% Injectable 25 milliLiter(s) IV Push once  insulin glargine Injectable (LANTUS) 10 Unit(s) SubCutaneous at bedtime  insulin lispro (HumaLOG) corrective regimen sliding scale   SubCutaneous three times a day before meals  levothyroxine 137 MICROGram(s) Oral daily  predniSONE   Tablet 10 milliGRAM(s) Oral daily  simvastatin 20 milliGRAM(s) Oral at bedtime    Topical/Other Medications  benzocaine 15 mG/menthol 3.6 mG (Sugar-Free) Lozenge 1 Lozenge Oral every 4 hours PRN Sore Throat  fluticasone propionate 50 MICROgram(s)/spray Nasal Spray 1 Spray(s) Both Nostrils two times a day  sodium chloride 0.65% Nasal 1 Spray(s) Both Nostrils every 6 hours PRN Nasal Congestion    --------------------------------------------------------------------------------------    VITAL SIGNS, INS/OUTS (last 24 hours):  --------------------------------------------------------------------------------------  ICU Vital Signs Last 24 Hrs  T(C): 36.6 (2019 00:00), Max: 38 (2019 14:35)  T(F): 97.8 (2019 00:00), Max: 100.4 (2019 14:35)  HR: 85 (2019 01:05) (78 - 102)  BP: 130/69 (2019 01:05) (97/64 - 146/85)  BP(mean): 79 (2019 01:05) (71 - 116)  ABP: --  ABP(mean): --  RR: 17 (2019 01:05) (14 - 28)  SpO2: 96% (2019 01:05) (87% - 97%)    I&O's Summary    2019 07:01  -  2019 01:14  --------------------------------------------------------  IN: 230 mL / OUT: 600 mL / NET: -370 mL  --------------------------------------------------------------------------------------  EXAM:  General/Neuro  RASS:   GCS:   Exam: Normal, NAD, alert, oriented x 3, no focal deficits. PERRLA  ***    Respiratory  Exam: Lungs clear to auscultation, Normal expansion/effort.  ***  [] Tracheostomy   [] Intubated  Mechanical Ventilation:     Cardiovascular  Exam: S1, S2.  Regular rate and rhythm.  Peripheral edema  ***  Cardiac Rhythm: Normal Sinus Rhythm  ECHO:     GI  Exam: Abdomen soft, Non-tender, Non-distended.  Gastrostomy / Jejunostomy tube in place.  Nasogastric tube in place.  Colostomy / Ileostomy.  ***  Wound:   ***  Current Diet:  NPO***      Tubes/Lines/Drains  ***  [x] Peripheral IV  [] Central Venous Line     	[] R	[] L	[] IJ	[] Fem	[] SC        Type:	    Date Placed:   [] Arterial Line		[] R	[] L	[] Fem	[] Rad	[] Ax	Date Placed:   [] PICC:         	[] Midline		[] Mediport           [] Urinary Catheter		Date Placed:     Extremities  Exam: Extremities warm, pink, well-perfused.        Derm:  Exam: Good skin turgor, no skin breakdown.      :   Exam: Antoine catheter in place.     LABS  --------------------------------------------------------------------------------------  Labs:  CAPILLARY BLOOD GLUCOSE      POCT Blood Glucose.: 158 mg/dL (2019 22:50)  POCT Blood Glucose.: 192 mg/dL (2019 17:20)  POCT Blood Glucose.: 229 mg/dL (2019 11:48)  POCT Blood Glucose.: 103 mg/dL (2019 08:03)  POCT Blood Glucose.: 140 mg/dL (2019 05:27)  POCT Blood Glucose.: 209 mg/dL (2019 02:29)                          7.5    8.29  )-----------( 352      ( 2019 05:45 )             24.2             145  |  107  |  18  ----------------------------<  119<H>  4.2   |  25  |  1.27    eGFR if Non : 46 mL/min (19 @ 05:45)      LFTs:     Blood Gas Arterial - Lactate: 0.9 mmol/L (19 @ 16:20)  Blood Gas Arterial - Lactate: 1.9 mmol/L (19 @ 13:08)  Blood Gas Arterial - Lactate: 0.7 mmol/L (19 @ 03:25)    ABG - ( 2019 16:20 )  pH: 7.39  /  pCO2: 52    /  pO2: 62    / HCO3: 29    / Base Excess: 5.9   /  SaO2: 91.0    ABG - ( 2019 13:08 )  pH: 7.29  /  pCO2: 51    /  pO2: 56    / HCO3: 22    / Base Excess: -1.8  /  SaO2: 85.6    ABG - ( 2019 03:25 )  pH: 7.29  /  pCO2: 52    /  pO2: 79    / HCO3: 23    / Base Excess: -1.3  /  SaO2: 93.8      Coags:  --------------------------------------------------------------------------------------  OTHER LABS    IMAGING RESULTS

## 2019-01-06 NOTE — PROGRESS NOTE ADULT - SUBJECTIVE AND OBJECTIVE BOX
Anesthesia called by SICU Team for assistance with intubation for impending respiratory distress. Patient on BiPAP with worsening arterial blood gases. On arrival to SICU, patient on BiPAP appearing laboring to breathe. Bag mask ventilation with 100% FiO2 performed.  Patient given Etomidate 20mg, propofol 10mg, and succinylcholine 180mg. Video laryngoscope Glide STAT 3 blade used,  7.0 cuffed ETT passed, + ETCO2 via EasyCap, + BBS, taped at 22 cm, teeth intact, no complications.

## 2019-01-07 LAB
ALBUMIN SERPL ELPH-MCNC: 2.8 G/DL — LOW (ref 3.3–5)
ALP SERPL-CCNC: 177 U/L — HIGH (ref 40–120)
ALT FLD-CCNC: 40 U/L — HIGH (ref 4–33)
AST SERPL-CCNC: 57 U/L — HIGH (ref 4–32)
BASE EXCESS BLDA CALC-SCNC: 2.9 MMOL/L — SIGNIFICANT CHANGE UP
BASE EXCESS BLDA CALC-SCNC: 4.3 MMOL/L — SIGNIFICANT CHANGE UP
BASE EXCESS BLDA CALC-SCNC: 5.4 MMOL/L — SIGNIFICANT CHANGE UP
BASE EXCESS BLDA CALC-SCNC: 5.8 MMOL/L — SIGNIFICANT CHANGE UP
BASE EXCESS BLDA CALC-SCNC: 6.5 MMOL/L — SIGNIFICANT CHANGE UP
BILIRUB SERPL-MCNC: 0.2 MG/DL — SIGNIFICANT CHANGE UP (ref 0.2–1.2)
BUN SERPL-MCNC: 15 MG/DL — SIGNIFICANT CHANGE UP (ref 7–23)
CA-I BLDA-SCNC: 1.2 MMOL/L — SIGNIFICANT CHANGE UP (ref 1.15–1.29)
CA-I BLDA-SCNC: 1.2 MMOL/L — SIGNIFICANT CHANGE UP (ref 1.15–1.29)
CALCIUM SERPL-MCNC: 8.9 MG/DL — SIGNIFICANT CHANGE UP (ref 8.4–10.5)
CHLORIDE BLDA-SCNC: 109 MMOL/L — HIGH (ref 96–108)
CHLORIDE SERPL-SCNC: 103 MMOL/L — SIGNIFICANT CHANGE UP (ref 98–107)
CO2 SERPL-SCNC: 27 MMOL/L — SIGNIFICANT CHANGE UP (ref 22–31)
CREAT SERPL-MCNC: 0.88 MG/DL — SIGNIFICANT CHANGE UP (ref 0.5–1.3)
GLUCOSE BLDA-MCNC: 202 MG/DL — HIGH (ref 70–99)
GLUCOSE BLDA-MCNC: 221 MG/DL — HIGH (ref 70–99)
GLUCOSE BLDA-MCNC: 242 MG/DL — HIGH (ref 70–99)
GLUCOSE BLDC GLUCOMTR-MCNC: 146 MG/DL — HIGH (ref 70–99)
GLUCOSE BLDC GLUCOMTR-MCNC: 158 MG/DL — HIGH (ref 70–99)
GLUCOSE BLDC GLUCOMTR-MCNC: 173 MG/DL — HIGH (ref 70–99)
GLUCOSE BLDC GLUCOMTR-MCNC: 195 MG/DL — HIGH (ref 70–99)
GLUCOSE BLDC GLUCOMTR-MCNC: 203 MG/DL — HIGH (ref 70–99)
GLUCOSE BLDC GLUCOMTR-MCNC: 203 MG/DL — HIGH (ref 70–99)
GLUCOSE BLDC GLUCOMTR-MCNC: 265 MG/DL — HIGH (ref 70–99)
GLUCOSE BLDC GLUCOMTR-MCNC: 268 MG/DL — HIGH (ref 70–99)
GLUCOSE BLDC GLUCOMTR-MCNC: 304 MG/DL — HIGH (ref 70–99)
GLUCOSE BLDC GLUCOMTR-MCNC: 324 MG/DL — HIGH (ref 70–99)
GLUCOSE SERPL-MCNC: 233 MG/DL — HIGH (ref 70–99)
HCO3 BLDA-SCNC: 26 MMOL/L — SIGNIFICANT CHANGE UP (ref 22–26)
HCO3 BLDA-SCNC: 28 MMOL/L — HIGH (ref 22–26)
HCO3 BLDA-SCNC: 29 MMOL/L — HIGH (ref 22–26)
HCO3 BLDA-SCNC: 30 MMOL/L — HIGH (ref 22–26)
HCO3 BLDA-SCNC: 30 MMOL/L — HIGH (ref 22–26)
HCT VFR BLD CALC: 25.6 % — LOW (ref 34.5–45)
HCT VFR BLDA CALC: 25.5 % — LOW (ref 34.5–46.5)
HCT VFR BLDA CALC: 25.6 % — LOW (ref 34.5–46.5)
HCT VFR BLDA CALC: 30.3 % — LOW (ref 34.5–46.5)
HGB BLD-MCNC: 7.8 G/DL — LOW (ref 11.5–15.5)
HGB BLDA-MCNC: 8.2 G/DL — LOW (ref 11.5–15.5)
HGB BLDA-MCNC: 8.2 G/DL — LOW (ref 11.5–15.5)
HGB BLDA-MCNC: 9.8 G/DL — LOW (ref 11.5–15.5)
LACTATE BLDA-SCNC: 1 MMOL/L — SIGNIFICANT CHANGE UP (ref 0.5–2)
LACTATE BLDA-SCNC: 1.9 MMOL/L — SIGNIFICANT CHANGE UP (ref 0.5–2)
LACTATE BLDA-SCNC: 3 MMOL/L — HIGH (ref 0.5–2)
MAGNESIUM SERPL-MCNC: 2.1 MG/DL — SIGNIFICANT CHANGE UP (ref 1.6–2.6)
MCHC RBC-ENTMCNC: 29.5 PG — SIGNIFICANT CHANGE UP (ref 27–34)
MCHC RBC-ENTMCNC: 30.5 % — LOW (ref 32–36)
MCV RBC AUTO: 97 FL — SIGNIFICANT CHANGE UP (ref 80–100)
NRBC # FLD: 0.16 K/UL — LOW (ref 25–125)
NRBC FLD-RTO: 1.8 — SIGNIFICANT CHANGE UP
PCO2 BLDA: 46 MMHG — SIGNIFICANT CHANGE UP (ref 32–48)
PCO2 BLDA: 49 MMHG — HIGH (ref 32–48)
PCO2 BLDA: 49 MMHG — HIGH (ref 32–48)
PCO2 BLDA: 51 MMHG — HIGH (ref 32–48)
PCO2 BLDA: 54 MMHG — HIGH (ref 32–48)
PH BLDA: 7.34 PH — LOW (ref 7.35–7.45)
PH BLDA: 7.38 PH — SIGNIFICANT CHANGE UP (ref 7.35–7.45)
PH BLDA: 7.41 PH — SIGNIFICANT CHANGE UP (ref 7.35–7.45)
PH BLDA: 7.42 PH — SIGNIFICANT CHANGE UP (ref 7.35–7.45)
PH BLDA: 7.43 PH — SIGNIFICANT CHANGE UP (ref 7.35–7.45)
PHOSPHATE SERPL-MCNC: 2.4 MG/DL — LOW (ref 2.5–4.5)
PLATELET # BLD AUTO: 480 K/UL — HIGH (ref 150–400)
PMV BLD: 9.3 FL — SIGNIFICANT CHANGE UP (ref 7–13)
PO2 BLDA: 52 MMHG — LOW (ref 83–108)
PO2 BLDA: 56 MMHG — LOW (ref 83–108)
PO2 BLDA: 64 MMHG — LOW (ref 83–108)
PO2 BLDA: 68 MMHG — LOW (ref 83–108)
PO2 BLDA: 88 MMHG — SIGNIFICANT CHANGE UP (ref 83–108)
POTASSIUM BLDA-SCNC: 3.2 MMOL/L — LOW (ref 3.4–4.5)
POTASSIUM BLDA-SCNC: 4.1 MMOL/L — SIGNIFICANT CHANGE UP (ref 3.4–4.5)
POTASSIUM BLDA-SCNC: 4.4 MMOL/L — SIGNIFICANT CHANGE UP (ref 3.4–4.5)
POTASSIUM SERPL-MCNC: 4.5 MMOL/L — SIGNIFICANT CHANGE UP (ref 3.5–5.3)
POTASSIUM SERPL-SCNC: 4.5 MMOL/L — SIGNIFICANT CHANGE UP (ref 3.5–5.3)
PROT SERPL-MCNC: 6.4 G/DL — SIGNIFICANT CHANGE UP (ref 6–8.3)
RBC # BLD: 2.64 M/UL — LOW (ref 3.8–5.2)
RBC # FLD: 16.3 % — HIGH (ref 10.3–14.5)
SAO2 % BLDA: 84.1 % — LOW (ref 95–99)
SAO2 % BLDA: 85.1 % — LOW (ref 95–99)
SAO2 % BLDA: 91.6 % — LOW (ref 95–99)
SAO2 % BLDA: 93.1 % — LOW (ref 95–99)
SAO2 % BLDA: 96.4 % — SIGNIFICANT CHANGE UP (ref 95–99)
SODIUM BLDA-SCNC: 138 MMOL/L — SIGNIFICANT CHANGE UP (ref 136–146)
SODIUM BLDA-SCNC: 140 MMOL/L — SIGNIFICANT CHANGE UP (ref 136–146)
SODIUM BLDA-SCNC: 143 MMOL/L — SIGNIFICANT CHANGE UP (ref 136–146)
SODIUM SERPL-SCNC: 142 MMOL/L — SIGNIFICANT CHANGE UP (ref 135–145)
WBC # BLD: 8.83 K/UL — SIGNIFICANT CHANGE UP (ref 3.8–10.5)
WBC # FLD AUTO: 8.83 K/UL — SIGNIFICANT CHANGE UP (ref 3.8–10.5)

## 2019-01-07 PROCEDURE — 99291 CRITICAL CARE FIRST HOUR: CPT

## 2019-01-07 PROCEDURE — 71045 X-RAY EXAM CHEST 1 VIEW: CPT | Mod: 26

## 2019-01-07 RX ORDER — INSULIN GLARGINE 100 [IU]/ML
15 INJECTION, SOLUTION SUBCUTANEOUS AT BEDTIME
Qty: 0 | Refills: 0 | Status: DISCONTINUED | OUTPATIENT
Start: 2019-01-07 | End: 2019-01-07

## 2019-01-07 RX ORDER — FUROSEMIDE 40 MG
40 TABLET ORAL ONCE
Qty: 0 | Refills: 0 | Status: COMPLETED | OUTPATIENT
Start: 2019-01-07 | End: 2019-01-07

## 2019-01-07 RX ORDER — INSULIN HUMAN 100 [IU]/ML
9 INJECTION, SOLUTION SUBCUTANEOUS ONCE
Qty: 0 | Refills: 0 | Status: COMPLETED | OUTPATIENT
Start: 2019-01-07 | End: 2019-01-07

## 2019-01-07 RX ORDER — INSULIN LISPRO 100/ML
VIAL (ML) SUBCUTANEOUS
Qty: 0 | Refills: 0 | Status: DISCONTINUED | OUTPATIENT
Start: 2019-01-07 | End: 2019-01-07

## 2019-01-07 RX ORDER — METOPROLOL TARTRATE 50 MG
5 TABLET ORAL EVERY 6 HOURS
Qty: 0 | Refills: 0 | Status: DISCONTINUED | OUTPATIENT
Start: 2019-01-07 | End: 2019-01-09

## 2019-01-07 RX ORDER — INSULIN HUMAN 100 [IU]/ML
2 INJECTION, SOLUTION SUBCUTANEOUS
Qty: 100 | Refills: 0 | Status: DISCONTINUED | OUTPATIENT
Start: 2019-01-07 | End: 2019-01-07

## 2019-01-07 RX ORDER — CHLORHEXIDINE GLUCONATE 213 G/1000ML
1 SOLUTION TOPICAL
Qty: 0 | Refills: 0 | Status: DISCONTINUED | OUTPATIENT
Start: 2019-01-07 | End: 2019-01-30

## 2019-01-07 RX ORDER — INSULIN GLARGINE 100 [IU]/ML
15 INJECTION, SOLUTION SUBCUTANEOUS AT BEDTIME
Qty: 0 | Refills: 0 | Status: DISCONTINUED | OUTPATIENT
Start: 2019-01-07 | End: 2019-01-08

## 2019-01-07 RX ORDER — INSULIN LISPRO 100/ML
3 VIAL (ML) SUBCUTANEOUS EVERY 6 HOURS
Qty: 0 | Refills: 0 | Status: DISCONTINUED | OUTPATIENT
Start: 2019-01-07 | End: 2019-01-07

## 2019-01-07 RX ORDER — INSULIN LISPRO 100/ML
VIAL (ML) SUBCUTANEOUS EVERY 6 HOURS
Qty: 0 | Refills: 0 | Status: DISCONTINUED | OUTPATIENT
Start: 2019-01-07 | End: 2019-01-07

## 2019-01-07 RX ORDER — FUROSEMIDE 40 MG
80 TABLET ORAL ONCE
Qty: 0 | Refills: 0 | Status: COMPLETED | OUTPATIENT
Start: 2019-01-07 | End: 2019-01-07

## 2019-01-07 RX ORDER — INSULIN GLARGINE 100 [IU]/ML
10 INJECTION, SOLUTION SUBCUTANEOUS AT BEDTIME
Qty: 0 | Refills: 0 | Status: DISCONTINUED | OUTPATIENT
Start: 2019-01-07 | End: 2019-01-07

## 2019-01-07 RX ORDER — INSULIN LISPRO 100/ML
VIAL (ML) SUBCUTANEOUS EVERY 6 HOURS
Qty: 0 | Refills: 0 | Status: DISCONTINUED | OUTPATIENT
Start: 2019-01-07 | End: 2019-01-08

## 2019-01-07 RX ORDER — HYDRALAZINE HCL 50 MG
10 TABLET ORAL ONCE
Qty: 0 | Refills: 0 | Status: COMPLETED | OUTPATIENT
Start: 2019-01-07 | End: 2019-01-07

## 2019-01-07 RX ORDER — INSULIN HUMAN 100 [IU]/ML
3 INJECTION, SOLUTION SUBCUTANEOUS
Qty: 100 | Refills: 0 | Status: DISCONTINUED | OUTPATIENT
Start: 2019-01-07 | End: 2019-01-07

## 2019-01-07 RX ORDER — POTASSIUM CHLORIDE 20 MEQ
20 PACKET (EA) ORAL ONCE
Qty: 0 | Refills: 0 | Status: COMPLETED | OUTPATIENT
Start: 2019-01-07 | End: 2019-01-07

## 2019-01-07 RX ORDER — CHLORHEXIDINE GLUCONATE 213 G/1000ML
15 SOLUTION TOPICAL
Qty: 0 | Refills: 0 | Status: DISCONTINUED | OUTPATIENT
Start: 2019-01-07 | End: 2019-01-30

## 2019-01-07 RX ORDER — AMLODIPINE BESYLATE 2.5 MG/1
5 TABLET ORAL DAILY
Qty: 0 | Refills: 0 | Status: DISCONTINUED | OUTPATIENT
Start: 2019-01-07 | End: 2019-01-09

## 2019-01-07 RX ADMIN — Medication 80 MILLIGRAM(S): at 08:41

## 2019-01-07 RX ADMIN — ALBUTEROL 1 PUFF(S): 90 AEROSOL, METERED ORAL at 15:30

## 2019-01-07 RX ADMIN — HEPARIN SODIUM 5000 UNIT(S): 5000 INJECTION INTRAVENOUS; SUBCUTANEOUS at 05:34

## 2019-01-07 RX ADMIN — FENTANYL CITRATE 2 MICROGRAM(S)/KG/HR: 50 INJECTION INTRAVENOUS at 22:19

## 2019-01-07 RX ADMIN — SIMVASTATIN 20 MILLIGRAM(S): 20 TABLET, FILM COATED ORAL at 22:18

## 2019-01-07 RX ADMIN — FENTANYL CITRATE 22.44 MICROGRAM(S)/KG/HR: 50 INJECTION INTRAVENOUS at 11:54

## 2019-01-07 RX ADMIN — Medication 3 UNIT(S): at 11:44

## 2019-01-07 RX ADMIN — PIPERACILLIN AND TAZOBACTAM 25 GRAM(S): 4; .5 INJECTION, POWDER, LYOPHILIZED, FOR SOLUTION INTRAVENOUS at 17:04

## 2019-01-07 RX ADMIN — Medication 100 MILLIEQUIVALENT(S): at 22:20

## 2019-01-07 RX ADMIN — ALBUTEROL 1 PUFF(S): 90 AEROSOL, METERED ORAL at 22:10

## 2019-01-07 RX ADMIN — DEXMEDETOMIDINE HYDROCHLORIDE IN 0.9% SODIUM CHLORIDE 5.61 MICROGRAM(S)/KG/HR: 4 INJECTION INTRAVENOUS at 22:18

## 2019-01-07 RX ADMIN — SODIUM CHLORIDE 4 MILLILITER(S): 9 INJECTION INTRAMUSCULAR; INTRAVENOUS; SUBCUTANEOUS at 03:38

## 2019-01-07 RX ADMIN — PANTOPRAZOLE SODIUM 40 MILLIGRAM(S): 20 TABLET, DELAYED RELEASE ORAL at 11:43

## 2019-01-07 RX ADMIN — BUDESONIDE AND FORMOTEROL FUMARATE DIHYDRATE 2 PUFF(S): 160; 4.5 AEROSOL RESPIRATORY (INHALATION) at 09:47

## 2019-01-07 RX ADMIN — Medication 50 MILLIGRAM(S): at 05:34

## 2019-01-07 RX ADMIN — CHLORHEXIDINE GLUCONATE 15 MILLILITER(S): 213 SOLUTION TOPICAL at 17:05

## 2019-01-07 RX ADMIN — PIPERACILLIN AND TAZOBACTAM 25 GRAM(S): 4; .5 INJECTION, POWDER, LYOPHILIZED, FOR SOLUTION INTRAVENOUS at 00:22

## 2019-01-07 RX ADMIN — HEPARIN SODIUM 5000 UNIT(S): 5000 INJECTION INTRAVENOUS; SUBCUTANEOUS at 13:36

## 2019-01-07 RX ADMIN — FENTANYL CITRATE 22.44 MICROGRAM(S)/KG/HR: 50 INJECTION INTRAVENOUS at 22:18

## 2019-01-07 RX ADMIN — Medication 10 MILLIGRAM(S): at 13:30

## 2019-01-07 RX ADMIN — INSULIN GLARGINE 15 UNIT(S): 100 INJECTION, SOLUTION SUBCUTANEOUS at 22:18

## 2019-01-07 RX ADMIN — AMLODIPINE BESYLATE 5 MILLIGRAM(S): 2.5 TABLET ORAL at 12:37

## 2019-01-07 RX ADMIN — SODIUM CHLORIDE 4 MILLILITER(S): 9 INJECTION INTRAMUSCULAR; INTRAVENOUS; SUBCUTANEOUS at 09:47

## 2019-01-07 RX ADMIN — Medication 50 MILLIGRAM(S): at 00:22

## 2019-01-07 RX ADMIN — PIPERACILLIN AND TAZOBACTAM 25 GRAM(S): 4; .5 INJECTION, POWDER, LYOPHILIZED, FOR SOLUTION INTRAVENOUS at 08:41

## 2019-01-07 RX ADMIN — HEPARIN SODIUM 5000 UNIT(S): 5000 INJECTION INTRAVENOUS; SUBCUTANEOUS at 22:17

## 2019-01-07 RX ADMIN — INSULIN HUMAN 9 UNIT(S): 100 INJECTION, SOLUTION SUBCUTANEOUS at 18:09

## 2019-01-07 RX ADMIN — Medication 68.5 MICROGRAM(S): at 22:19

## 2019-01-07 RX ADMIN — CHLORHEXIDINE GLUCONATE 1 APPLICATION(S): 213 SOLUTION TOPICAL at 17:05

## 2019-01-07 RX ADMIN — Medication 2: at 05:43

## 2019-01-07 RX ADMIN — Medication 30 MILLIGRAM(S): at 05:34

## 2019-01-07 RX ADMIN — Medication 50 MILLIGRAM(S): at 17:04

## 2019-01-07 RX ADMIN — SODIUM CHLORIDE 4 MILLILITER(S): 9 INJECTION INTRAMUSCULAR; INTRAVENOUS; SUBCUTANEOUS at 22:00

## 2019-01-07 RX ADMIN — BUDESONIDE AND FORMOTEROL FUMARATE DIHYDRATE 2 PUFF(S): 160; 4.5 AEROSOL RESPIRATORY (INHALATION) at 22:16

## 2019-01-07 RX ADMIN — SODIUM CHLORIDE 4 MILLILITER(S): 9 INJECTION INTRAMUSCULAR; INTRAVENOUS; SUBCUTANEOUS at 15:30

## 2019-01-07 RX ADMIN — DEXMEDETOMIDINE HYDROCHLORIDE IN 0.9% SODIUM CHLORIDE 5.61 MICROGRAM(S)/KG/HR: 4 INJECTION INTRAVENOUS at 07:15

## 2019-01-07 RX ADMIN — Medication 5 MILLIGRAM(S): at 17:05

## 2019-01-07 RX ADMIN — Medication 50 MILLIGRAM(S): at 11:41

## 2019-01-07 RX ADMIN — ALBUTEROL 1 PUFF(S): 90 AEROSOL, METERED ORAL at 09:47

## 2019-01-07 RX ADMIN — INSULIN HUMAN 3 UNIT(S)/HR: 100 INJECTION, SOLUTION SUBCUTANEOUS at 18:09

## 2019-01-07 RX ADMIN — Medication 30 MILLIGRAM(S): at 17:19

## 2019-01-07 RX ADMIN — ALBUTEROL 1 PUFF(S): 90 AEROSOL, METERED ORAL at 03:36

## 2019-01-07 RX ADMIN — FENTANYL CITRATE 22.44 MICROGRAM(S)/KG/HR: 50 INJECTION INTRAVENOUS at 07:15

## 2019-01-07 RX ADMIN — Medication 5 MILLIGRAM(S): at 11:39

## 2019-01-07 RX ADMIN — Medication 40 MILLIGRAM(S): at 17:05

## 2019-01-07 RX ADMIN — Medication 4: at 11:44

## 2019-01-07 NOTE — PROGRESS NOTE ADULT - ATTENDING COMMENTS
Cristin Spicer MD  Pager [phone] ([facility])/ [phone] ([facility]) [please provide 10 digit call back number]  Nights and weekends: [phone]  Please note that this patient may be followed by a different provider tomorrow. If no answer or after hours, please contact [phone].  For final dc reccomendations, please call [phone] or page the endocrine fellow on call.

## 2019-01-07 NOTE — PROGRESS NOTE ADULT - PROBLEM SELECTOR PLAN 3
PO prednisone   Would not decrease prednisone below 10 mg which is her maintenance dose without discussing with her outpatient physicians.     IF HD unstable or plans for surgery, pt will need stress dose steroids (hydrocort 50q8*)  if with fever or infection during inpt course, will need to consider to increase prednisone to 20 or 30mg daily Contimeu hydrocortisone 50mg q 6 hours  Taper as per SICU protocol   Once patietn requiring lesss than hydorcortiosn 25mcg daily, can tape to home dose of prednisone 10mg daily Continue hydrocortisone 50mg q 6 hours  Taper as per SICU protocol   Once patient requiring less than hydrocortisone 25mcg daily, can taper to home dose of prednisone 10mg daily currently on hydrcortisone 50 q6 for stress dose steroids given critically ill state (pt is on pred 10mg chronically)  Taper as per SICU protocol   Once patient requiring less than hydrocortisone 50mcg daily, would place back in home dose of prednisone 10mg daily (pred 10mg = 40mg hydrocort)

## 2019-01-07 NOTE — PROGRESS NOTE ADULT - ASSESSMENT
63F w/ multiple respiratory comorbidities p/w SBO s/p ex-lap with findings of healthy bowel c/b evisceration of bowel s/p ex-lap with placement of retention sutures with increasing respiratory demand on Bipap.      - DVT ppx  - Cont abx for multifocal pna   - F/U blood cx  - monitor GI fxn   - monitor respiratory status   - management per SICU team     Fatoumata Danielle, PGY-1  [facility] B Team [id number]

## 2019-01-07 NOTE — PROGRESS NOTE ADULT - ATTENDING COMMENTS
Critical Care Dx    Acute Respiratory failure with chronic underlying COPD, hypoxic/hypercapneic   -P/F ration <100  -Flu +, on Tamiflu, bilateral infiltrates, requiring elevated PEEP  -CPAP trial, goal to lower Fi02 < 60%    Hyperglycemia  -D/c sliding scale  -Basal/bolus insulin regiment with corrective scale, frequent fingersticks  -con't tube feeds    Multifocal pneumonia, +Flu  -Zosyn and tamiflu ordered  -trend CXR, ABG's   -Bronch as needed if infiltrate seen    Hypothyroidism  -Con't IV conversion of synthroid     RA, COPD  -stress dose steroids given baseline requirement of prednisone and now with surgical/inflammatory stress    At risk for malnutrition  -reached goal tube feedings but has been underfed in post-surgical time period  -monitor prealbumin weekly     The patient is a critical care patient with life threatening respiratory instability in SICU.  I have personally interviewed and examined the patient, reviewed data and laboratory tests/x-rays and all pertinent electronic images.  The SICU team has a constant risk benefit analyzes discussion with the primary team, all consultants, House Staff and PA's on all decisions.  These diagnoses are unrelated to the surgical procedure noted.  Time involved in performance of separately billable procedures was not counted toward my critical care time. There is no overlap.    I spent 55 minutes in total providing critical care for the diagnoses, assessment and plan above.      I was physically present for the key portions of the evaluation and management (E/M) service provided.  I agree with the above history, physical, and plan which I have reviewed and edited where appropriate.     Ryan Bear MD  Acute and Critical Care Surgery

## 2019-01-07 NOTE — PROGRESS NOTE ADULT - ASSESSMENT
ASSESSMENT:  63y Female hx of COPD presented initially with SBO, underwent surgery closed with 4 retention sutures, evicerated on 12/30 and closed with 6 more retention sutures, now c/b multifocal pneumonia, flu and respiratory distress /hypoxia on continuous bipap 16/8.    #neuro  -sedation with fentanyl and precedex  -attempt sedation holiday in AM if ABG is improved    #resp  -intubated on AC. Attempt SBT once clinical status improves  - hx COPD and pulm HTN, current smoker  - c/w duoneb, pulmonary toilet    #cv  - off pressors  -vitals stable  -holding antihypertensives  -would not diurese given sporadic hypotension    #GI  - s/p ex-lap  for evisceration, retention sutures placed  - c/w protonix daily  - c/w TF    #renal  - hx CKD  - BUN/Crt wnl  - monitor I's&O's  - repletions PRN      #heme  - sqh for dvt ppx    #ID  - trend fevers  - f/u UA  - c/w zosyn and tamiflu    #endo  - hx RA on chronic prednisone, DM  - continue hydrocortisone   -Lantus 10u, ISS    #dispo  -SICU      Critical Care Dx:  - Respiratory failure  - CHRIS  - Morbid obesity  - Evisceration  - Severe protein/calorie malnutrition  - Pulmonary HTN  - COPD  - Adrenal insufficiency ASSESSMENT:  63y Female hx of COPD presented initially with SBO, underwent surgery closed with 4 retention sutures, evicerated on 12/30 and closed with 6 more retention sutures, now c/b multifocal pneumonia, flu and respiratory distress /hypoxia on continuous bipap 16/8.    #neuro  -sedation with fentanyl and precedex  -attempt sedation holiday in AM if ABG is improved    #resp  -intubated on AC. Attempt SBT once clinical status improves  - hx COPD and pulm HTN, current smoker  - c/w duoneb, pulmonary toilet    #cv  - off pressors  -vitals stable  -holding antihypertensives  -would not diurese given sporadic hypotension  -monitor A-line site, difficult Arterial access low axillary/brachial    #GI  - s/p ex-lap  for evisceration, retention sutures placed  - c/w protonix daily  - c/w TF    #renal  - hx CKD  - BUN/Crt wnl  - monitor I's&O's  - repletions PRN      #heme  - sqh for dvt ppx    #ID  - trend fevers  - f/u UA  - c/w zosyn and tamiflu    #endo  - hx RA on chronic prednisone, DM  - continue hydrocortisone   -Lantus 10u, ISS    #dispo  -SICU      Critical Care Dx:  - Respiratory failure  - CHRIS  - Morbid obesity  - Evisceration  - Severe protein/calorie malnutrition  - Pulmonary HTN  - COPD  - Adrenal insufficiency ASSESSMENT:  63y Female hx of COPD presented initially with SBO, underwent surgery closed with 4 retention sutures, evicerated on 12/30 and closed with 6 more retention sutures, now c/b multifocal pneumonia, flu and respiratory distress /hypoxia on continuous bipap. Patient temporarily transferred to RCU, but developed hypercapnia and respiratory distress requiring intubation 1/6 and subsequent transfer to SICU.     #neuro  Awake  - Sedation with fentanyl and precedex held this am for SBT  - Attempt sedation holiday in AM    #resp  - Intubated on AC. Attempting SBT this morning  - Will check ABG and trend PaO2/FiO2 ratio  - Hx COPD and pulm HTN, current smoker  - C/w duoneb, pulmonary toilet    #cv  Total body fluid overload  - Off pressors  - Vitals stable  - Restarting Nadolol   - Giving 80mg Lasix in the morning  - Monitor A-line site, difficult Arterial access low axillary/brachial    #GI  - s/p ex-lap  for evisceration, retention sutures placed  - BM overnight  - c/w protonix daily  - c/w TF    #renal  - hx CKD  - BUN/Crt wnl  - C/w iraheta  - monitor I's&O's  - repletions PRN    #heme  - sqh for dvt ppx    #ID  - trend fevers  - f/u UA  - c/w zosyn and tamiflu    #endo  - hx RA on chronic prednisone, DM  - continue hydrocortisone   - Lantus 10u increased to 15u, ISS    #dispo  -SICU      Critical Care Dx:  - Respiratory failure  - CHRIS  - Morbid obesity  - Evisceration  - Severe protein/calorie malnutrition  - Pulmonary HTN  - COPD  - Adrenal insufficiency

## 2019-01-07 NOTE — PROGRESS NOTE ADULT - ASSESSMENT
63 F with PMH of COPD (on home O2 (3L), hypothyroidism, T2DM (HbA1c 8s 11/2018, on insulin), RA on prednisone (10 mg daily x 30 years), HTN,  admitted for SBO s/p ex lap with lysis of adhesions, complicated by poor glycemic control with periods of hyperglycemia and hypoglycemia on home regimen as well as inpatient with no oral intake in setting of SBO, pt with minimal PO intake, sleeping for most part during the day, currently on bipap      endocrine called for DM management 63 F with PMH of COPD (on home O2 (3L), hypothyroidism, T2DM (HbA1c 8s 11/2018, on insulin), RA on prednisone (10 mg daily x 30 years), HTN,  admitted for SBO s/p ex lap with lysis of adhesions, now intubated for hypercapnic respiratory failure. Patient is on Glucerna tube feeds (18 hour duration).      endocrine called for DM management

## 2019-01-07 NOTE — CHART NOTE - NSCHARTNOTEFT_GEN_A_CORE
Bedside echo performed.  Pt has appropriate LV function with adequate squeeze on PSL/PSS view.  No IVC variation with respiration.    Rachel Flaherty PGY2

## 2019-01-07 NOTE — PROGRESS NOTE ADULT - PROBLEM SELECTOR PLAN 1
check FSBG TID qac and hs  carb consistent diet   - Lantus  10 u qhs  -decrease to low dose SS TIDAC/qhs given taper in steroids to home dose   -uncertain to very low PO intake, therefore, only ISS for now to calculate 24 hrs need and if premeal insulin is warranted as well   inform endocrine of hypoglycemia or persistent hyperglycemia episodes as changes in pts insulin regimen will need to be made.   notify endocrine if any plans to be NPO/diet changes as this will also affect insulin regimen.    - d/c likely:  LIKELY DC regimen:  sitagliptin and likely lower dose tresiba pending FSG trends -Recommend NPH 6U at 6AM, 12PM and 6PM.  -Contineu low dose correctional scale q 6 hours  -Check FS q 6 hours  Goal glucose 100-180 -Recommend NPH 6U at 6AM, 12PM and 6PM. No NPH between midnight and 6AM.   -Continue low dose correctional scale q 6 hours  -Check FS q 6 hours  Goal glucose 100-180 -instead if lantus and correction scale, would transition to NPH 6U at 6AM, 12PM and 6PM. No NPH between midnight and 6AM.   -Continue low dose correctional scale q 6 hours  -Check FS q 6 hours  Goal glucose 100-180

## 2019-01-07 NOTE — PROGRESS NOTE ADULT - SUBJECTIVE AND OBJECTIVE BOX
Chief Complaint:     History:    MEDICATIONS  (STANDING):  ALBUTerol    90 MICROgram(s) HFA Inhaler 1 Puff(s) Inhalation every 6 hours  amLODIPine   Tablet 5 milliGRAM(s) Oral daily  buDESOnide 160 MICROgram(s)/formoterol 4.5 MICROgram(s) Inhaler 2 Puff(s) Inhalation two times a day  chlorhexidine 0.12% Liquid 15 milliLiter(s) Swish and Spit two times a day  chlorhexidine 4% Liquid 1 Application(s) Topical <User Schedule>  dexmedetomidine Infusion 0.2 MICROgram(s)/kG/Hr (5.61 mL/Hr) IV Continuous <Continuous>  dextrose 50% Injectable 25 milliLiter(s) IV Push once  fentaNYL   Infusion 2 MICROgram(s)/kG/Hr (22.44 mL/Hr) IV Continuous <Continuous>  heparin  Injectable 5000 Unit(s) SubCutaneous every 8 hours  hydrALAZINE Injectable 10 milliGRAM(s) IV Push once  hydrocortisone sodium succinate Injectable 50 milliGRAM(s) IV Push every 6 hours  influenza   Vaccine 0.5 milliLiter(s) IntraMuscular once  insulin glargine Injectable (LANTUS) 10 Unit(s) SubCutaneous at bedtime  insulin lispro (HumaLOG) corrective regimen sliding scale   SubCutaneous every 6 hours  insulin lispro Injectable (HumaLOG) 3 Unit(s) SubCutaneous every 6 hours  levothyroxine Injectable 68.5 MICROGram(s) IV Push at bedtime  metoprolol tartrate Injectable 5 milliGRAM(s) IV Push every 6 hours  oseltamivir 30 milliGRAM(s) Oral two times a day  pantoprazole  Injectable 40 milliGRAM(s) IV Push daily  piperacillin/tazobactam IVPB. 3.375 Gram(s) IV Intermittent every 8 hours  simvastatin 20 milliGRAM(s) Oral at bedtime  sodium chloride 3%  Inhalation 4 milliLiter(s) Inhalation every 6 hours    MEDICATIONS  (PRN):      Allergies    No Known Allergies    Intolerances      Review of Systems:  Constitutional: No fever  Eyes: No blurry vision  Neuro: No tremors  HEENT: No pain  Cardiovascular: No chest pain, palpitations  Respiratory: No SOB, no cough  GI: No nausea, vomiting, abdominal pain  : No dysuria  Skin: no rash  Psych: no depression  Endocrine: no polyuria, polydipsia  Hem/lymph: no swelling  Osteoporosis: no fractures    ALL OTHER SYSTEMS REVIEWED AND NEGATIVE    UNABLE TO OBTAIN    PHYSICAL EXAM:  VITALS: T(C): 37.7 (01-07-19 @ 12:00)  T(F): 99.8 (01-07-19 @ 12:00), Max: 99.9 (01-06-19 @ 16:00)  HR: 73 (01-07-19 @ 13:15) (59 - 87)  BP: --  RR:  (14 - 19)  SpO2:  (92% - 100%)  Wt(kg): --  GENERAL: NAD, well-groomed, well-developed  EYES: No proptosis, no lid lag, anicteric  HEENT:  Atraumatic, Normocephalic, moist mucous membranes  THYROID: Normal size, no palpable nodules  RESPIRATORY: Clear to auscultation bilaterally; No rales, rhonchi, wheezing, or rubs  CARDIOVASCULAR: Regular rate and rhythm; No murmurs; no peripheral edema  GI: Soft, nontender, non distended, normal bowel sounds  SKIN: Dry, intact, No rashes or lesions  MUSCULOSKELETAL: Full range of motion, normal strength  NEURO: sensation intact, extraocular movements intact, no tremor, normal reflexes  PSYCH: Alert and oriented x 3, normal affect, normal mood  CUSHING'S SIGNS: no striae    POCT Blood Glucose.: 304 mg/dL (01-07-19 @ 11:34)  POCT Blood Glucose.: 195 mg/dL (01-07-19 @ 05:36)  POCT Blood Glucose.: 265 mg/dL (01-07-19 @ 00:30)  POCT Blood Glucose.: 287 mg/dL (01-06-19 @ 23:09)  POCT Blood Glucose.: 220 mg/dL (01-06-19 @ 17:37)  POCT Blood Glucose.: 186 mg/dL (01-06-19 @ 13:05)  POCT Blood Glucose.: 158 mg/dL (01-05-19 @ 22:50)  POCT Blood Glucose.: 192 mg/dL (01-05-19 @ 17:20)  POCT Blood Glucose.: 229 mg/dL (01-05-19 @ 11:48)  POCT Blood Glucose.: 103 mg/dL (01-05-19 @ 08:03)  POCT Blood Glucose.: 140 mg/dL (01-05-19 @ 05:27)  POCT Blood Glucose.: 209 mg/dL (01-05-19 @ 02:29)  POCT Blood Glucose.: 229 mg/dL (01-04-19 @ 21:49)  POCT Blood Glucose.: 185 mg/dL (01-04-19 @ 16:58)      01-07    142  |  103  |  15  ----------------------------<  233<H>  4.5   |  27  |  0.88    EGFR if : 82  EGFR if non : 71    Ca    8.9      01-07  Mg     2.1     01-07  Phos  2.4     01-07    TPro  6.4  /  Alb  2.8<L>  /  TBili  0.2  /  DBili  x   /  AST  57<H>  /  ALT  40<H>  /  AlkPhos  177<H>  01-07          Thyroid Function Tests:  12-27 @ 07:15 TSH -- FreeT4 1.18 T3 -- Anti TPO -- Anti Thyroglobulin Ab -- TSI --  12-20 @ 06:45 TSH 40.15 FreeT4 0.98 T3 -- Anti TPO -- Anti Thyroglobulin Ab -- TSI --      Hemoglobin A1C, Whole Blood: 7.1 % <H> [4.0 - 5.6] (12-17-18 @ 03:50)  Hemoglobin A1C, Whole Blood: 7.3 % <H> [4.0 - 5.6] (12-16-18 @ 04:17) Chief Complaint: DM2    History:    MEDICATIONS  (STANDING):  ALBUTerol    90 MICROgram(s) HFA Inhaler 1 Puff(s) Inhalation every 6 hours  amLODIPine   Tablet 5 milliGRAM(s) Oral daily  buDESOnide 160 MICROgram(s)/formoterol 4.5 MICROgram(s) Inhaler 2 Puff(s) Inhalation two times a day  chlorhexidine 0.12% Liquid 15 milliLiter(s) Swish and Spit two times a day  chlorhexidine 4% Liquid 1 Application(s) Topical <User Schedule>  dexmedetomidine Infusion 0.2 MICROgram(s)/kG/Hr (5.61 mL/Hr) IV Continuous <Continuous>  dextrose 50% Injectable 25 milliLiter(s) IV Push once  fentaNYL   Infusion 2 MICROgram(s)/kG/Hr (22.44 mL/Hr) IV Continuous <Continuous>  heparin  Injectable 5000 Unit(s) SubCutaneous every 8 hours  hydrALAZINE Injectable 10 milliGRAM(s) IV Push once  hydrocortisone sodium succinate Injectable 50 milliGRAM(s) IV Push every 6 hours  influenza   Vaccine 0.5 milliLiter(s) IntraMuscular once  insulin glargine Injectable (LANTUS) 10 Unit(s) SubCutaneous at bedtime  insulin lispro (HumaLOG) corrective regimen sliding scale   SubCutaneous every 6 hours  insulin lispro Injectable (HumaLOG) 3 Unit(s) SubCutaneous every 6 hours  levothyroxine Injectable 68.5 MICROGram(s) IV Push at bedtime  metoprolol tartrate Injectable 5 milliGRAM(s) IV Push every 6 hours  oseltamivir 30 milliGRAM(s) Oral two times a day  pantoprazole  Injectable 40 milliGRAM(s) IV Push daily  piperacillin/tazobactam IVPB. 3.375 Gram(s) IV Intermittent every 8 hours  simvastatin 20 milliGRAM(s) Oral at bedtime  sodium chloride 3%  Inhalation 4 milliLiter(s) Inhalation every 6 hours    MEDICATIONS  (PRN):      Allergies    No Known Allergies    Intolerances      Review of Systems:  Constitutional: No fever  Eyes: No blurry vision  Neuro: No tremors  HEENT: No pain  Cardiovascular: No chest pain, palpitations  Respiratory: No SOB, no cough  GI: No nausea, vomiting, abdominal pain  : No dysuria  Skin: no rash  Psych: no depression  Endocrine: no polyuria, polydipsia  Hem/lymph: no swelling  Osteoporosis: no fractures    ALL OTHER SYSTEMS REVIEWED AND NEGATIVE    UNABLE TO OBTAIN    PHYSICAL EXAM:  VITALS: T(C): 37.7 (01-07-19 @ 12:00)  T(F): 99.8 (01-07-19 @ 12:00), Max: 99.9 (01-06-19 @ 16:00)  HR: 73 (01-07-19 @ 13:15) (59 - 87)  BP: --  RR:  (14 - 19)  SpO2:  (92% - 100%)  Wt(kg): --  GENERAL: NAD, well-groomed, well-developed  EYES: No proptosis, no lid lag, anicteric  HEENT:  Atraumatic, Normocephalic, moist mucous membranes  THYROID: Normal size, no palpable nodules  RESPIRATORY: Clear to auscultation bilaterally; No rales, rhonchi, wheezing, or rubs  CARDIOVASCULAR: Regular rate and rhythm; No murmurs; no peripheral edema  GI: Soft, nontender, non distended, normal bowel sounds  SKIN: Dry, intact, No rashes or lesions  MUSCULOSKELETAL: Full range of motion, normal strength  NEURO: sensation intact, extraocular movements intact, no tremor, normal reflexes  PSYCH: Alert and oriented x 3, normal affect, normal mood  CUSHING'S SIGNS: no striae    POCT Blood Glucose.: 304 mg/dL (01-07-19 @ 11:34)  POCT Blood Glucose.: 195 mg/dL (01-07-19 @ 05:36)  POCT Blood Glucose.: 265 mg/dL (01-07-19 @ 00:30)  POCT Blood Glucose.: 287 mg/dL (01-06-19 @ 23:09)  POCT Blood Glucose.: 220 mg/dL (01-06-19 @ 17:37)  POCT Blood Glucose.: 186 mg/dL (01-06-19 @ 13:05)  POCT Blood Glucose.: 158 mg/dL (01-05-19 @ 22:50)  POCT Blood Glucose.: 192 mg/dL (01-05-19 @ 17:20)  POCT Blood Glucose.: 229 mg/dL (01-05-19 @ 11:48)  POCT Blood Glucose.: 103 mg/dL (01-05-19 @ 08:03)  POCT Blood Glucose.: 140 mg/dL (01-05-19 @ 05:27)  POCT Blood Glucose.: 209 mg/dL (01-05-19 @ 02:29)  POCT Blood Glucose.: 229 mg/dL (01-04-19 @ 21:49)  POCT Blood Glucose.: 185 mg/dL (01-04-19 @ 16:58)      01-07    142  |  103  |  15  ----------------------------<  233<H>  4.5   |  27  |  0.88    EGFR if : 82  EGFR if non : 71    Ca    8.9      01-07  Mg     2.1     01-07  Phos  2.4     01-07    TPro  6.4  /  Alb  2.8<L>  /  TBili  0.2  /  DBili  x   /  AST  57<H>  /  ALT  40<H>  /  AlkPhos  177<H>  01-07          Thyroid Function Tests:  12-27 @ 07:15 TSH -- FreeT4 1.18 T3 -- Anti TPO -- Anti Thyroglobulin Ab -- TSI --  12-20 @ 06:45 TSH 40.15 FreeT4 0.98 T3 -- Anti TPO -- Anti Thyroglobulin Ab -- TSI --      Hemoglobin A1C, Whole Blood: 7.1 % <H> [4.0 - 5.6] (12-17-18 @ 03:50)  Hemoglobin A1C, Whole Blood: 7.3 % <H> [4.0 - 5.6] (12-16-18 @ 04:17) Chief Complaint: DM2    History: Patient is intubated. Has been started on Glucerna TFs. Also on hydrocortisone 50mg IV q 6 hours    MEDICATIONS  (STANDING):  ALBUTerol    90 MICROgram(s) HFA Inhaler 1 Puff(s) Inhalation every 6 hours  amLODIPine   Tablet 5 milliGRAM(s) Oral daily  buDESOnide 160 MICROgram(s)/formoterol 4.5 MICROgram(s) Inhaler 2 Puff(s) Inhalation two times a day  chlorhexidine 0.12% Liquid 15 milliLiter(s) Swish and Spit two times a day  chlorhexidine 4% Liquid 1 Application(s) Topical <User Schedule>  dexmedetomidine Infusion 0.2 MICROgram(s)/kG/Hr (5.61 mL/Hr) IV Continuous <Continuous>  dextrose 50% Injectable 25 milliLiter(s) IV Push once  fentaNYL   Infusion 2 MICROgram(s)/kG/Hr (22.44 mL/Hr) IV Continuous <Continuous>  heparin  Injectable 5000 Unit(s) SubCutaneous every 8 hours  hydrALAZINE Injectable 10 milliGRAM(s) IV Push once  hydrocortisone sodium succinate Injectable 50 milliGRAM(s) IV Push every 6 hours  influenza   Vaccine 0.5 milliLiter(s) IntraMuscular once  insulin glargine Injectable (LANTUS) 10 Unit(s) SubCutaneous at bedtime  insulin lispro (HumaLOG) corrective regimen sliding scale   SubCutaneous every 6 hours  insulin lispro Injectable (HumaLOG) 3 Unit(s) SubCutaneous every 6 hours  levothyroxine Injectable 68.5 MICROGram(s) IV Push at bedtime  metoprolol tartrate Injectable 5 milliGRAM(s) IV Push every 6 hours  oseltamivir 30 milliGRAM(s) Oral two times a day  pantoprazole  Injectable 40 milliGRAM(s) IV Push daily  piperacillin/tazobactam IVPB. 3.375 Gram(s) IV Intermittent every 8 hours  simvastatin 20 milliGRAM(s) Oral at bedtime  sodium chloride 3%  Inhalation 4 milliLiter(s) Inhalation every 6 hours    MEDICATIONS  (PRN):      Allergies    No Known Allergies    Intolerances      Review of Systems:      UNABLE TO OBTAIN    PHYSICAL EXAM:  VITALS: T(C): 37.7 (01-07-19 @ 12:00)  T(F): 99.8 (01-07-19 @ 12:00), Max: 99.9 (01-06-19 @ 16:00)  HR: 73 (01-07-19 @ 13:15) (59 - 87)  BP: --  RR:  (14 - 19)  SpO2:  (92% - 100%)  Wt(kg): --  GENERAL: NAD, well-groomed, well-developed  EYES: No proptosis, no lid lag, anicteric  HEENT:  Atraumatic, Normocephalic, moist mucous membranes  THYROID: Normal size, no palpable nodules  RESPIRATORY: Clear to auscultation bilaterally; No rales, rhonchi, wheezing, or rubs  CARDIOVASCULAR: Regular rate and rhythm; No murmurs; no peripheral edema  GI: Soft, nontender, non distended, normal bowel sounds  SKIN: Dry, intact, No rashes or lesions  MUSCULOSKELETAL: Full range of motion, normal strength  NEURO: sensation intact, extraocular movements intact, no tremor, normal reflexes  PSYCH: Alert and oriented x 3, normal affect, normal mood  CUSHING'S SIGNS: no striae    POCT Blood Glucose.: 304 mg/dL (01-07-19 @ 11:34)  POCT Blood Glucose.: 195 mg/dL (01-07-19 @ 05:36)  POCT Blood Glucose.: 265 mg/dL (01-07-19 @ 00:30)  POCT Blood Glucose.: 287 mg/dL (01-06-19 @ 23:09)  POCT Blood Glucose.: 220 mg/dL (01-06-19 @ 17:37)  POCT Blood Glucose.: 186 mg/dL (01-06-19 @ 13:05)  POCT Blood Glucose.: 158 mg/dL (01-05-19 @ 22:50)  POCT Blood Glucose.: 192 mg/dL (01-05-19 @ 17:20)  POCT Blood Glucose.: 229 mg/dL (01-05-19 @ 11:48)  POCT Blood Glucose.: 103 mg/dL (01-05-19 @ 08:03)  POCT Blood Glucose.: 140 mg/dL (01-05-19 @ 05:27)  POCT Blood Glucose.: 209 mg/dL (01-05-19 @ 02:29)  POCT Blood Glucose.: 229 mg/dL (01-04-19 @ 21:49)  POCT Blood Glucose.: 185 mg/dL (01-04-19 @ 16:58)      01-07    142  |  103  |  15  ----------------------------<  233<H>  4.5   |  27  |  0.88    EGFR if : 82  EGFR if non : 71    Ca    8.9      01-07  Mg     2.1     01-07  Phos  2.4     01-07    TPro  6.4  /  Alb  2.8<L>  /  TBili  0.2  /  DBili  x   /  AST  57<H>  /  ALT  40<H>  /  AlkPhos  177<H>  01-07          Thyroid Function Tests:  12-27 @ 07:15 TSH -- FreeT4 1.18 T3 -- Anti TPO -- Anti Thyroglobulin Ab -- TSI --  12-20 @ 06:45 TSH 40.15 FreeT4 0.98 T3 -- Anti TPO -- Anti Thyroglobulin Ab -- TSI --      Hemoglobin A1C, Whole Blood: 7.1 % <H> [4.0 - 5.6] (12-17-18 @ 03:50)  Hemoglobin A1C, Whole Blood: 7.3 % <H> [4.0 - 5.6] (12-16-18 @ 04:17) Chief Complaint: DM2    History: Patient is intubated. Has been started on Glucerna TFs. Also on hydrocortisone 50mg IV q 6 hours    MEDICATIONS  (STANDING):  ALBUTerol    90 MICROgram(s) HFA Inhaler 1 Puff(s) Inhalation every 6 hours  amLODIPine   Tablet 5 milliGRAM(s) Oral daily  buDESOnide 160 MICROgram(s)/formoterol 4.5 MICROgram(s) Inhaler 2 Puff(s) Inhalation two times a day  chlorhexidine 0.12% Liquid 15 milliLiter(s) Swish and Spit two times a day  chlorhexidine 4% Liquid 1 Application(s) Topical <User Schedule>  dexmedetomidine Infusion 0.2 MICROgram(s)/kG/Hr (5.61 mL/Hr) IV Continuous <Continuous>  dextrose 50% Injectable 25 milliLiter(s) IV Push once  fentaNYL   Infusion 2 MICROgram(s)/kG/Hr (22.44 mL/Hr) IV Continuous <Continuous>  heparin  Injectable 5000 Unit(s) SubCutaneous every 8 hours  hydrALAZINE Injectable 10 milliGRAM(s) IV Push once  hydrocortisone sodium succinate Injectable 50 milliGRAM(s) IV Push every 6 hours  influenza   Vaccine 0.5 milliLiter(s) IntraMuscular once  insulin glargine Injectable (LANTUS) 10 Unit(s) SubCutaneous at bedtime  insulin lispro (HumaLOG) corrective regimen sliding scale   SubCutaneous every 6 hours  insulin lispro Injectable (HumaLOG) 3 Unit(s) SubCutaneous every 6 hours  levothyroxine Injectable 68.5 MICROGram(s) IV Push at bedtime  metoprolol tartrate Injectable 5 milliGRAM(s) IV Push every 6 hours  oseltamivir 30 milliGRAM(s) Oral two times a day  pantoprazole  Injectable 40 milliGRAM(s) IV Push daily  piperacillin/tazobactam IVPB. 3.375 Gram(s) IV Intermittent every 8 hours  simvastatin 20 milliGRAM(s) Oral at bedtime  sodium chloride 3%  Inhalation 4 milliLiter(s) Inhalation every 6 hours    MEDICATIONS  (PRN):      Allergies    No Known Allergies    Intolerances      Review of Systems:      UNABLE TO OBTAIN    PHYSICAL EXAM:  VITALS: T(C): 37.7 (01-07-19 @ 12:00)  T(F): 99.8 (01-07-19 @ 12:00), Max: 99.9 (01-06-19 @ 16:00)  HR: 73 (01-07-19 @ 13:15) (59 - 87)  BP: --  RR:  (14 - 19)  SpO2:  (92% - 100%)  Wt(kg): --  GENERAL: NAD  EYES: No proptosis, no lid lag, anicteric  HEENT:  Intubated  THYROID: Normal size, no palpable nodules  RESPIRATORY: Clear to auscultation bilaterally; No rales, rhonchi, wheezing, or rubs  CARDIOVASCULAR: Regular rate and rhythm; No murmurs; no peripheral edema  GI: Soft, nontender,   SKIN: Dry, intact, No rashes or lesions  PSYCH: Alert   CUSHING'S SIGNS: no striae    POCT Blood Glucose.: 304 mg/dL (01-07-19 @ 11:34)  POCT Blood Glucose.: 195 mg/dL (01-07-19 @ 05:36)  POCT Blood Glucose.: 265 mg/dL (01-07-19 @ 00:30)  POCT Blood Glucose.: 287 mg/dL (01-06-19 @ 23:09)  POCT Blood Glucose.: 220 mg/dL (01-06-19 @ 17:37)  POCT Blood Glucose.: 186 mg/dL (01-06-19 @ 13:05)  POCT Blood Glucose.: 158 mg/dL (01-05-19 @ 22:50)  POCT Blood Glucose.: 192 mg/dL (01-05-19 @ 17:20)  POCT Blood Glucose.: 229 mg/dL (01-05-19 @ 11:48)  POCT Blood Glucose.: 103 mg/dL (01-05-19 @ 08:03)  POCT Blood Glucose.: 140 mg/dL (01-05-19 @ 05:27)  POCT Blood Glucose.: 209 mg/dL (01-05-19 @ 02:29)  POCT Blood Glucose.: 229 mg/dL (01-04-19 @ 21:49)  POCT Blood Glucose.: 185 mg/dL (01-04-19 @ 16:58)      01-07    142  |  103  |  15  ----------------------------<  233<H>  4.5   |  27  |  0.88    EGFR if : 82  EGFR if non : 71    Ca    8.9      01-07  Mg     2.1     01-07  Phos  2.4     01-07    TPro  6.4  /  Alb  2.8<L>  /  TBili  0.2  /  DBili  x   /  AST  57<H>  /  ALT  40<H>  /  AlkPhos  177<H>  01-07          Thyroid Function Tests:  12-27 @ 07:15 TSH -- FreeT4 1.18 T3 -- Anti TPO -- Anti Thyroglobulin Ab -- TSI --  12-20 @ 06:45 TSH 40.15 FreeT4 0.98 T3 -- Anti TPO -- Anti Thyroglobulin Ab -- TSI --      Hemoglobin A1C, Whole Blood: 7.1 % <H> [4.0 - 5.6] (12-17-18 @ 03:50)  Hemoglobin A1C, Whole Blood: 7.3 % <H> [4.0 - 5.6] (12-16-18 @ 04:17)

## 2019-01-07 NOTE — PROGRESS NOTE ADULT - SUBJECTIVE AND OBJECTIVE BOX
SICU Progress Note    24 HOUR EVENTS: Intubated in AM, briefly on Levo no off. Attempt to wean down Fio2 from 100% now 70% based on serial ABG.      HISTORY   63 year old woman with COPD on home O2 (3L), moderate pulmonary HTN, DM, RA on prednisone (10 mg daily x 30 years), HTN, s/p CVA (no residual deficit), s/p  x2 presents with abdominal pain for one day.  She developed mid abdominal cramping pain, sharp, last evening that has been constant and moderate to severe.  It was associated with multiple episodes of nonbloody, nonbilious emesis.  She had a normal bowel movement this morning and has not passed flatus today.  CT scan shows SBO with transition point in the lower abdomen with associated interloop fluid and mesenteric edema. (15 Dec 2018 23:33)  She underwent surgery with 4 retention sutures, evicerated on  when coughing, needing 6 more retention sutures. She then had multiple febrile episodes and underwent CT c/a/p which showed multifocal pneumonia. SHe has been on bipap intermittently during the sicu stay but was stepped down to floors on 1/3. She was then put on bipap on RCU. Consulted SICU for worsening resp status despite continuous bipap  with fevers. +flu with pneumonia.     PAST MEDICAL & SURGICAL HISTORY:  Diverticulosis  CVA (Cerebral Vascular Accident)  RA (Rheumatoid Arthritis)  Tooth Abscess  Arthritis  Cigarette Smoker  Chronic Asthma  Adult Hypothyroidism  Borderline Diabetes Mellitus  High Cholesterol  H/O: HTN  Wrist Disorder: S/P Surgery  History of  Section    Home Meds: Home Medications:  acetaminophen 325 mg oral tablet: 2 tab(s) orally every 6 hours as needed (30 May 2018 11:00)  alendronate: 1 tab(s) orally once a week (24 May 2018 12:34)  amLODIPine 5 mg oral tablet: 1 tab(s) orally once a day (24 May 2018 12:34)  clopidogrel 75 mg oral tablet: 1 tab(s) orally once a day (24 May 2018 12:34)  docusate sodium 100 mg oral capsule: 1 cap(s) orally 3 times a day (30 May 2018 07:13)  fluticasone 50 mcg/inh nasal spray: 1 spray(s) nasal 2 times a day for 1 week (30 May 2018 11:00)  furosemide 40 mg oral tablet: 1 tab(s) orally once a day in the morning (24 May 2018 12:34)  loratadine 10 mg oral tablet: 1 tab(s) orally once a day (24 May 2018 12:34)  Naprosyn 500 mg oral tablet: 1 tab(s) orally 2 times a day (31 Dec 2018 14:44)  oxybutynin 5 mg oral tablet: 1 tab(s) orally 2 times a day (24 May 2018 12:34)  polyethylene glycol 3350 oral powder for reconstitution: 17 gram(s) orally once a day (30 May 2018 07:13)  predniSONE 10 mg oral tablet: 1 tab(s) orally once a day (24 May 2018 12:34)  senna oral tablet: 2 tab(s) orally once a day (at bedtime) (30 May 2018 07:13)  simvastatin 20 mg oral tablet: 1 tab(s) orally once a day (at bedtime) (24 May 2018 12:34)  sodium chloride 0.65% nasal spray: 1 spray(s) nasal 4 times a day for 1 week (30 May 2018 11:00)  sulfaSALAzine 500 mg oral tablet: 1 tab(s) orally 2 times a day (30 May 2018 11:00)  tiotropium 18 mcg inhalation capsule: 1 cap(s) inhaled once a day (24 May 2018 12:34)  Toprol-XL 50 mg oral tablet, extended release: 1 tab(s) orally once a day (24 May 2018 12:34)  traMADol 300 mg/24 hours oral tablet, extended release: 1 tab(s) orally once a day (31 Dec 2018 14:42)    Allergies: No Known Allergies  Advanced Directives: Presumed Full Code   REVIEW OF SYSTEMS  [x] A ten-point review of systems was otherwise negative except as noted.  [ ] Due to altered mental status/intubation, subjective information were not able to be obtained from the patient. History was obtained, to the extent possible, from review of the chart and collateral sources of information.    CURRENT MEDICATIONS:   --------------------------------------------------------------------------------------  Neurologic Medications  acetaminophen   Tablet .. 650 milliGRAM(s) Oral every 6 hours PRN Temp greater or equal to 38C (100.4F)  gabapentin 300 milliGRAM(s) Oral three times a day  oxyCODONE    IR 15 milliGRAM(s) Oral every 3 hours PRN Severe Pain (7 - 10)  traMADol 50 milliGRAM(s) Oral every 6 hours    Respiratory Medications  ALBUTerol/ipratropium for Nebulization 3 milliLiter(s) Nebulizer every 6 hours  buDESOnide 160 MICROgram(s)/formoterol 4.5 MICROgram(s) Inhaler 2 Puff(s) Inhalation two times a day  guaiFENesin  milliGRAM(s) Oral every 12 hours PRN Cough  sodium chloride 3%  Inhalation 4 milliLiter(s) Inhalation every 6 hours    Cardiovascular Medications  metoprolol tartrate 25 milliGRAM(s) Oral two times a day    Gastrointestinal Medications  calcium carbonate    500 mG (Tums) Chewable 1 Tablet(s) Chew every 4 hours PRN Heartburn  docusate sodium 100 milliGRAM(s) Oral three times a day  pantoprazole    Tablet 40 milliGRAM(s) Oral before breakfast  polyethylene glycol 3350 17 Gram(s) Oral daily  senna 2 Tablet(s) Oral at bedtime    Genitourinary Medications    Hematologic/Oncologic Medications  heparin  Injectable 5000 Unit(s) SubCutaneous every 8 hours  influenza   Vaccine 0.5 milliLiter(s) IntraMuscular once    Antimicrobial/Immunologic Medications  oseltamivir 30 milliGRAM(s) Oral two times a day  piperacillin/tazobactam IVPB. 3.375 Gram(s) IV Intermittent every 8 hours    Endocrine/Metabolic Medications  dextrose 50% Injectable 25 milliLiter(s) IV Push once  insulin glargine Injectable (LANTUS) 10 Unit(s) SubCutaneous at bedtime  insulin lispro (HumaLOG) corrective regimen sliding scale   SubCutaneous three times a day before meals  levothyroxine 137 MICROGram(s) Oral daily  predniSONE   Tablet 10 milliGRAM(s) Oral daily  simvastatin 20 milliGRAM(s) Oral at bedtime    Topical/Other Medications  benzocaine 15 mG/menthol 3.6 mG (Sugar-Free) Lozenge 1 Lozenge Oral every 4 hours PRN Sore Throat  fluticasone propionate 50 MICROgram(s)/spray Nasal Spray 1 Spray(s) Both Nostrils two times a day  sodium chloride 0.65% Nasal 1 Spray(s) Both Nostrils every 6 hours PRN Nasal Congestion    --------------------------------------------------------------------------------------  VITAL SIGNS, INS/OUTS (last 24 hours):  --------------------------------------------------------------------------------------  ICU Vital Signs Last 24 Hrs  T(C): 37.5 (2019 10:00), Max: 38.5 (2019 17:48)  T(F): 99.5 (2019 10:00), Max: 101.3 (2019 17:48)  HR: 90 (2019 12:52) (80 - 98)  BP: 146/62 (2019 12:52) (102/82 - 146/68)  RR: 17 (2019 12:52) (17 - 28)  SpO2: 90% (2019 12:52) (88% - 97%)    I&O's Summary  EXAM: Normal, NAD, alert, oriented x 3, no focal deficits. PERRLA  ***  Respiratory: wheezing, crackles heard bilaterally  Cardiovascular: S1, S2.  Regular rate and rhythm.  Peripheral edema  ***  GI: 10 retention sutures in place, mild erythema / erosion of sutures  Tubes/Lines/Drains  ***  [x] Peripheral IV  [x] Central Venous Line     	[x] R	[] L	[x] IJ	[] Fem	[] SC        Type:	    Date Placed:   [x] Arterial Line		[x] R	[] L	[] Fem	[] Rad	[x] Ax	Date Placed:   [] PICC:         	[] Midline		[] Mediport           [] Urinary Catheter		Date Placed:     Extremities  Exam: Extremities warm, pink, well-perfused.      LABS  --------------------------------------------------------------------------------------  Labs:  CAPILLARY BLOOD GLUCOSE  POCT Blood Glucose.: 229 mg/dL (2019 11:48)  POCT Blood Glucose.: 103 mg/dL (2019 08:03)  POCT Blood Glucose.: 140 mg/dL (2019 05:27)  POCT Blood Glucose.: 209 mg/dL (2019 02:29)  POCT Blood Glucose.: 229 mg/dL (2019 21:49)  POCT Blood Glucose.: 185 mg/dL (2019 16:58)               7.5    8.29  )-----------( 352      ( 2019 05:45 )             24.2     145  |  107  |  18  ----------------------------<  119<H>  4.2   |  25  |  1.27    eGFR if Non : 46 mL/min (19 @ 05:45)    LFTs:   Blood Gas Arterial - Lactate: 1.9 mmol/L (19 @ 13:08)  Blood Gas Arterial - Lactate: 0.7 mmol/L (19 @ 03:25)    ABG - ( 2019 13:08 )  pH: 7.29  /  pCO2: 51    /  pO2: 56    / HCO3: 22    / Base Excess: -1.8  /  SaO2: 85.6    ABG - ( 2019 03:25 )  pH: 7.29  /  pCO2: 52    /  pO2: 79    / HCO3: 23    / Base Excess: -1.3  /  SaO2: 93.8    ABG - ( 31 Dec 2018 16:58 )  pH: 7.38  /  pCO2: 43    /  pO2: 77    / HCO3: 25    / Base Excess: 0.7   /  SaO2: 94.1      Culture - Respiratory with Gram Stain (collected 2019 13:11)  Source: SPUTUM  Preliminary Report (2019 09:39):    CULTURE IN PROGRESS, FURTHER REPORT TO FOLLOW.  --------------------------------------------------------------------------------------  IMAGING RESULTS

## 2019-01-07 NOTE — PROGRESS NOTE ADULT - SUBJECTIVE AND OBJECTIVE BOX
Surgery Progress Note    S: Patient seen and examined. Patient remains intubated, on vent requiring high FiO2 settings. Weaned off the levo. On tube feeds.     O:  Vital Signs Last 24 Hrs  T(C): 37.7 (07 Jan 2019 08:00), Max: 37.7 (06 Jan 2019 16:00)  T(F): 99.8 (07 Jan 2019 08:00), Max: 99.9 (06 Jan 2019 16:00)  HR: 72 (07 Jan 2019 09:00) (59 - 87)  BP: 97/61 (06 Jan 2019 12:00) (84/59 - 116/75)  BP(mean): 69 (06 Jan 2019 12:00) (65 - 84)  RR: 15 (07 Jan 2019 09:00) (14 - 19)  SpO2: 97% (07 Jan 2019 09:00) (92% - 100%)    I&O's Detail    06 Jan 2019 07:01  -  07 Jan 2019 07:00  --------------------------------------------------------  IN:    dexmedetomidine Infusion: 190.4 mL    fentaNYL  Infusion: 246.4 mL    fentaNYL  Infusion: 145.6 mL    IV PiggyBack: 400 mL    norepinephrine Infusion: 16.1 mL  Total IN: 998.5 mL    OUT:    Indwelling Catheter - Urethral: 642 mL  Total OUT: 642 mL    Total NET: 356.5 mL      07 Jan 2019 07:01  -  07 Jan 2019 09:39  --------------------------------------------------------  IN:    dexmedetomidine Infusion: 22.4 mL    fentaNYL  Infusion: 44.8 mL    IV PiggyBack: 100 mL  Total IN: 167.2 mL    OUT:    Indwelling Catheter - Urethral: 45 mL  Total OUT: 45 mL    Total NET: 122.2 mL          MEDICATIONS  (STANDING):  ALBUTerol    90 MICROgram(s) HFA Inhaler 1 Puff(s) Inhalation every 6 hours  buDESOnide 160 MICROgram(s)/formoterol 4.5 MICROgram(s) Inhaler 2 Puff(s) Inhalation two times a day  chlorhexidine 0.12% Liquid 15 milliLiter(s) Swish and Spit two times a day  chlorhexidine 4% Liquid 1 Application(s) Topical <User Schedule>  dexmedetomidine Infusion 0.2 MICROgram(s)/kG/Hr (5.61 mL/Hr) IV Continuous <Continuous>  dextrose 50% Injectable 25 milliLiter(s) IV Push once  fentaNYL   Infusion 2 MICROgram(s)/kG/Hr (22.44 mL/Hr) IV Continuous <Continuous>  heparin  Injectable 5000 Unit(s) SubCutaneous every 8 hours  hydrocortisone sodium succinate Injectable 50 milliGRAM(s) IV Push every 6 hours  influenza   Vaccine 0.5 milliLiter(s) IntraMuscular once  insulin glargine Injectable (LANTUS) 15 Unit(s) SubCutaneous at bedtime  insulin lispro (HumaLOG) corrective regimen sliding scale   SubCutaneous three times a day before meals  insulin lispro Injectable (HumaLOG) 3 Unit(s) SubCutaneous every 6 hours  levothyroxine Injectable 68.5 MICROGram(s) IV Push at bedtime  oseltamivir 30 milliGRAM(s) Oral two times a day  pantoprazole  Injectable 40 milliGRAM(s) IV Push daily  piperacillin/tazobactam IVPB. 3.375 Gram(s) IV Intermittent every 8 hours  simvastatin 20 milliGRAM(s) Oral at bedtime  sodium chloride 3%  Inhalation 4 milliLiter(s) Inhalation every 6 hours    MEDICATIONS  (PRN):                            7.8    8.83  )-----------( 480      ( 07 Jan 2019 02:45 )             25.6       01-07    142  |  103  |  15  ----------------------------<  233<H>  4.5   |  27  |  0.88    Ca    8.9      07 Jan 2019 02:45  Phos  2.4     01-07  Mg     2.1     01-07    TPro  6.4  /  Alb  2.8<L>  /  TBili  0.2  /  DBili  x   /  AST  57<H>  /  ALT  40<H>  /  AlkPhos  177<H>  01-07      Physical Exam:  Gen: Laying in bed, NAD  Resp: Unlabored breathing  Abd: soft, NT, distended, incision c/d/i, retention sutures in place,    Ext: WWP  Skin: No rashes

## 2019-01-07 NOTE — PROGRESS NOTE ADULT - PROBLEM SELECTOR PLAN 2
Continue levothyroxine 137 mcg po daily Increase levothyroxine 96 mcg IV daily Increase levothyroxine to 96 mcg IV daily

## 2019-01-08 LAB
BASE EXCESS BLDA CALC-SCNC: 6.1 MMOL/L — SIGNIFICANT CHANGE UP
BASE EXCESS BLDA CALC-SCNC: 7.5 MMOL/L — SIGNIFICANT CHANGE UP
BASE EXCESS BLDA CALC-SCNC: 7.6 MMOL/L — SIGNIFICANT CHANGE UP
BASE EXCESS BLDA CALC-SCNC: 7.7 MMOL/L — SIGNIFICANT CHANGE UP
BASE EXCESS BLDA CALC-SCNC: 8.2 MMOL/L — SIGNIFICANT CHANGE UP
BUN SERPL-MCNC: 18 MG/DL — SIGNIFICANT CHANGE UP (ref 7–23)
CA-I BLDA-SCNC: 1.2 MMOL/L — SIGNIFICANT CHANGE UP (ref 1.15–1.29)
CA-I BLDA-SCNC: 1.2 MMOL/L — SIGNIFICANT CHANGE UP (ref 1.15–1.29)
CALCIUM SERPL-MCNC: 9.2 MG/DL — SIGNIFICANT CHANGE UP (ref 8.4–10.5)
CHLORIDE SERPL-SCNC: 97 MMOL/L — LOW (ref 98–107)
CO2 SERPL-SCNC: 29 MMOL/L — SIGNIFICANT CHANGE UP (ref 22–31)
CREAT SERPL-MCNC: 0.91 MG/DL — SIGNIFICANT CHANGE UP (ref 0.5–1.3)
GLUCOSE BLDA-MCNC: 239 MG/DL — HIGH (ref 70–99)
GLUCOSE BLDA-MCNC: 241 MG/DL — HIGH (ref 70–99)
GLUCOSE BLDC GLUCOMTR-MCNC: 239 MG/DL — HIGH (ref 70–99)
GLUCOSE BLDC GLUCOMTR-MCNC: 258 MG/DL — HIGH (ref 70–99)
GLUCOSE BLDC GLUCOMTR-MCNC: 265 MG/DL — HIGH (ref 70–99)
GLUCOSE BLDC GLUCOMTR-MCNC: 270 MG/DL — HIGH (ref 70–99)
GLUCOSE BLDC GLUCOMTR-MCNC: 276 MG/DL — HIGH (ref 70–99)
GLUCOSE BLDC GLUCOMTR-MCNC: 282 MG/DL — HIGH (ref 70–99)
GLUCOSE BLDC GLUCOMTR-MCNC: 283 MG/DL — HIGH (ref 70–99)
GLUCOSE BLDC GLUCOMTR-MCNC: 287 MG/DL — HIGH (ref 70–99)
GLUCOSE SERPL-MCNC: 241 MG/DL — HIGH (ref 70–99)
HCO3 BLDA-SCNC: 30 MMOL/L — HIGH (ref 22–26)
HCO3 BLDA-SCNC: 31 MMOL/L — HIGH (ref 22–26)
HCO3 BLDA-SCNC: 32 MMOL/L — HIGH (ref 22–26)
HCT VFR BLD CALC: 24.3 % — LOW (ref 34.5–45)
HCT VFR BLDA CALC: 23.8 % — LOW (ref 34.5–46.5)
HCT VFR BLDA CALC: 24.9 % — LOW (ref 34.5–46.5)
HGB BLD-MCNC: 7.6 G/DL — LOW (ref 11.5–15.5)
HGB BLDA-MCNC: 7.6 G/DL — LOW (ref 11.5–15.5)
HGB BLDA-MCNC: 8 G/DL — LOW (ref 11.5–15.5)
LACTATE BLDA-SCNC: 1.1 MMOL/L — SIGNIFICANT CHANGE UP (ref 0.5–2)
LACTATE BLDA-SCNC: 1.1 MMOL/L — SIGNIFICANT CHANGE UP (ref 0.5–2)
MAGNESIUM SERPL-MCNC: 1.8 MG/DL — SIGNIFICANT CHANGE UP (ref 1.6–2.6)
MCHC RBC-ENTMCNC: 30.2 PG — SIGNIFICANT CHANGE UP (ref 27–34)
MCHC RBC-ENTMCNC: 31.3 % — LOW (ref 32–36)
MCV RBC AUTO: 96.4 FL — SIGNIFICANT CHANGE UP (ref 80–100)
NRBC # FLD: 0.22 K/UL — LOW (ref 25–125)
NRBC FLD-RTO: 2.3 — SIGNIFICANT CHANGE UP
PCO2 BLDA: 45 MMHG — SIGNIFICANT CHANGE UP (ref 32–48)
PCO2 BLDA: 48 MMHG — SIGNIFICANT CHANGE UP (ref 32–48)
PCO2 BLDA: 50 MMHG — HIGH (ref 32–48)
PH BLDA: 7.42 PH — SIGNIFICANT CHANGE UP (ref 7.35–7.45)
PH BLDA: 7.43 PH — SIGNIFICANT CHANGE UP (ref 7.35–7.45)
PH BLDA: 7.44 PH — SIGNIFICANT CHANGE UP (ref 7.35–7.45)
PH BLDA: 7.45 PH — SIGNIFICANT CHANGE UP (ref 7.35–7.45)
PH BLDA: 7.46 PH — HIGH (ref 7.35–7.45)
PHOSPHATE SERPL-MCNC: 2.8 MG/DL — SIGNIFICANT CHANGE UP (ref 2.5–4.5)
PLATELET # BLD AUTO: 497 K/UL — HIGH (ref 150–400)
PMV BLD: 9.5 FL — SIGNIFICANT CHANGE UP (ref 7–13)
PO2 BLDA: 128 MMHG — HIGH (ref 83–108)
PO2 BLDA: 63 MMHG — LOW (ref 83–108)
PO2 BLDA: 66 MMHG — LOW (ref 83–108)
PO2 BLDA: 91 MMHG — SIGNIFICANT CHANGE UP (ref 83–108)
PO2 BLDA: 94 MMHG — SIGNIFICANT CHANGE UP (ref 83–108)
POTASSIUM BLDA-SCNC: 3.8 MMOL/L — SIGNIFICANT CHANGE UP (ref 3.4–4.5)
POTASSIUM BLDA-SCNC: 4.3 MMOL/L — SIGNIFICANT CHANGE UP (ref 3.4–4.5)
POTASSIUM SERPL-MCNC: 4.3 MMOL/L — SIGNIFICANT CHANGE UP (ref 3.5–5.3)
POTASSIUM SERPL-SCNC: 4.3 MMOL/L — SIGNIFICANT CHANGE UP (ref 3.5–5.3)
RBC # BLD: 2.52 M/UL — LOW (ref 3.8–5.2)
RBC # FLD: 15.6 % — HIGH (ref 10.3–14.5)
SAO2 % BLDA: 90.5 % — LOW (ref 95–99)
SAO2 % BLDA: 91.1 % — LOW (ref 95–99)
SAO2 % BLDA: 96 % — SIGNIFICANT CHANGE UP (ref 95–99)
SAO2 % BLDA: 96.5 % — SIGNIFICANT CHANGE UP (ref 95–99)
SAO2 % BLDA: 97.8 % — SIGNIFICANT CHANGE UP (ref 95–99)
SODIUM BLDA-SCNC: 140 MMOL/L — SIGNIFICANT CHANGE UP (ref 136–146)
SODIUM BLDA-SCNC: 140 MMOL/L — SIGNIFICANT CHANGE UP (ref 136–146)
SODIUM SERPL-SCNC: 139 MMOL/L — SIGNIFICANT CHANGE UP (ref 135–145)
WBC # BLD: 9.47 K/UL — SIGNIFICANT CHANGE UP (ref 3.8–10.5)
WBC # FLD AUTO: 9.47 K/UL — SIGNIFICANT CHANGE UP (ref 3.8–10.5)

## 2019-01-08 PROCEDURE — 99232 SBSQ HOSP IP/OBS MODERATE 35: CPT

## 2019-01-08 PROCEDURE — 71045 X-RAY EXAM CHEST 1 VIEW: CPT | Mod: 26

## 2019-01-08 PROCEDURE — 99291 CRITICAL CARE FIRST HOUR: CPT

## 2019-01-08 RX ORDER — HYDRALAZINE HCL 50 MG
10 TABLET ORAL ONCE
Qty: 0 | Refills: 0 | Status: COMPLETED | OUTPATIENT
Start: 2019-01-08 | End: 2019-01-08

## 2019-01-08 RX ORDER — FUROSEMIDE 40 MG
40 TABLET ORAL
Qty: 0 | Refills: 0 | Status: DISCONTINUED | OUTPATIENT
Start: 2019-01-08 | End: 2019-01-08

## 2019-01-08 RX ORDER — INSULIN LISPRO 100/ML
VIAL (ML) SUBCUTANEOUS EVERY 4 HOURS
Qty: 0 | Refills: 0 | Status: DISCONTINUED | OUTPATIENT
Start: 2019-01-08 | End: 2019-01-08

## 2019-01-08 RX ORDER — PROPOFOL 10 MG/ML
5 INJECTION, EMULSION INTRAVENOUS
Qty: 1000 | Refills: 0 | Status: DISCONTINUED | OUTPATIENT
Start: 2019-01-08 | End: 2019-01-09

## 2019-01-08 RX ORDER — HUMAN INSULIN 100 [IU]/ML
10 INJECTION, SUSPENSION SUBCUTANEOUS EVERY 6 HOURS
Qty: 0 | Refills: 0 | Status: DISCONTINUED | OUTPATIENT
Start: 2019-01-08 | End: 2019-01-10

## 2019-01-08 RX ORDER — HYDROCORTISONE 20 MG
50 TABLET ORAL EVERY 8 HOURS
Qty: 0 | Refills: 0 | Status: DISCONTINUED | OUTPATIENT
Start: 2019-01-08 | End: 2019-01-09

## 2019-01-08 RX ORDER — MAGNESIUM SULFATE 500 MG/ML
2 VIAL (ML) INJECTION ONCE
Qty: 0 | Refills: 0 | Status: COMPLETED | OUTPATIENT
Start: 2019-01-08 | End: 2019-01-08

## 2019-01-08 RX ORDER — HUMAN INSULIN 100 [IU]/ML
6 INJECTION, SUSPENSION SUBCUTANEOUS
Qty: 0 | Refills: 0 | Status: DISCONTINUED | OUTPATIENT
Start: 2019-01-08 | End: 2019-01-08

## 2019-01-08 RX ORDER — ACETAMINOPHEN 500 MG
1000 TABLET ORAL ONCE
Qty: 0 | Refills: 0 | Status: COMPLETED | OUTPATIENT
Start: 2019-01-08 | End: 2019-01-08

## 2019-01-08 RX ORDER — INSULIN LISPRO 100/ML
4 VIAL (ML) SUBCUTANEOUS ONCE
Qty: 0 | Refills: 0 | Status: COMPLETED | OUTPATIENT
Start: 2019-01-08 | End: 2019-01-08

## 2019-01-08 RX ORDER — INSULIN GLARGINE 100 [IU]/ML
15 INJECTION, SOLUTION SUBCUTANEOUS AT BEDTIME
Qty: 0 | Refills: 0 | Status: DISCONTINUED | OUTPATIENT
Start: 2019-01-08 | End: 2019-01-08

## 2019-01-08 RX ORDER — INSULIN LISPRO 100/ML
4 VIAL (ML) SUBCUTANEOUS EVERY 6 HOURS
Qty: 0 | Refills: 0 | Status: DISCONTINUED | OUTPATIENT
Start: 2019-01-08 | End: 2019-01-08

## 2019-01-08 RX ORDER — HUMAN INSULIN 100 [IU]/ML
10 INJECTION, SUSPENSION SUBCUTANEOUS THREE TIMES A DAY
Qty: 0 | Refills: 0 | Status: DISCONTINUED | OUTPATIENT
Start: 2019-01-08 | End: 2019-01-08

## 2019-01-08 RX ORDER — INSULIN LISPRO 100/ML
VIAL (ML) SUBCUTANEOUS EVERY 4 HOURS
Qty: 0 | Refills: 0 | Status: DISCONTINUED | OUTPATIENT
Start: 2019-01-08 | End: 2019-01-10

## 2019-01-08 RX ORDER — FUROSEMIDE 40 MG
40 TABLET ORAL
Qty: 0 | Refills: 0 | Status: DISCONTINUED | OUTPATIENT
Start: 2019-01-08 | End: 2019-01-11

## 2019-01-08 RX ORDER — HUMAN INSULIN 100 [IU]/ML
6 INJECTION, SUSPENSION SUBCUTANEOUS THREE TIMES A DAY
Qty: 0 | Refills: 0 | Status: DISCONTINUED | OUTPATIENT
Start: 2019-01-08 | End: 2019-01-08

## 2019-01-08 RX ORDER — FENTANYL CITRATE 50 UG/ML
2.5 INJECTION INTRAVENOUS
Qty: 2500 | Refills: 0 | Status: DISCONTINUED | OUTPATIENT
Start: 2019-01-08 | End: 2019-01-09

## 2019-01-08 RX ORDER — LEVOTHYROXINE SODIUM 125 MCG
96 TABLET ORAL AT BEDTIME
Qty: 0 | Refills: 0 | Status: DISCONTINUED | OUTPATIENT
Start: 2019-01-08 | End: 2019-01-10

## 2019-01-08 RX ADMIN — FENTANYL CITRATE 28.05 MICROGRAM(S)/KG/HR: 50 INJECTION INTRAVENOUS at 19:32

## 2019-01-08 RX ADMIN — Medication 5 MILLIGRAM(S): at 00:11

## 2019-01-08 RX ADMIN — BUDESONIDE AND FORMOTEROL FUMARATE DIHYDRATE 2 PUFF(S): 160; 4.5 AEROSOL RESPIRATORY (INHALATION) at 22:43

## 2019-01-08 RX ADMIN — Medication 40 MILLIGRAM(S): at 13:55

## 2019-01-08 RX ADMIN — CHLORHEXIDINE GLUCONATE 15 MILLILITER(S): 213 SOLUTION TOPICAL at 05:20

## 2019-01-08 RX ADMIN — PROPOFOL 3.37 MICROGRAM(S)/KG/MIN: 10 INJECTION, EMULSION INTRAVENOUS at 14:21

## 2019-01-08 RX ADMIN — PROPOFOL 3.37 MICROGRAM(S)/KG/MIN: 10 INJECTION, EMULSION INTRAVENOUS at 19:32

## 2019-01-08 RX ADMIN — Medication 50 GRAM(S): at 04:58

## 2019-01-08 RX ADMIN — Medication 5 MILLIGRAM(S): at 13:47

## 2019-01-08 RX ADMIN — HUMAN INSULIN 6 UNIT(S): 100 INJECTION, SUSPENSION SUBCUTANEOUS at 14:06

## 2019-01-08 RX ADMIN — SODIUM CHLORIDE 4 MILLILITER(S): 9 INJECTION INTRAMUSCULAR; INTRAVENOUS; SUBCUTANEOUS at 09:08

## 2019-01-08 RX ADMIN — SIMVASTATIN 20 MILLIGRAM(S): 20 TABLET, FILM COATED ORAL at 22:12

## 2019-01-08 RX ADMIN — ALBUTEROL 1 PUFF(S): 90 AEROSOL, METERED ORAL at 09:08

## 2019-01-08 RX ADMIN — Medication 96 MICROGRAM(S): at 22:27

## 2019-01-08 RX ADMIN — HUMAN INSULIN 6 UNIT(S): 100 INJECTION, SUSPENSION SUBCUTANEOUS at 08:47

## 2019-01-08 RX ADMIN — Medication 400 MILLIGRAM(S): at 20:05

## 2019-01-08 RX ADMIN — Medication 2: at 00:11

## 2019-01-08 RX ADMIN — ALBUTEROL 1 PUFF(S): 90 AEROSOL, METERED ORAL at 03:40

## 2019-01-08 RX ADMIN — BUDESONIDE AND FORMOTEROL FUMARATE DIHYDRATE 2 PUFF(S): 160; 4.5 AEROSOL RESPIRATORY (INHALATION) at 09:09

## 2019-01-08 RX ADMIN — PIPERACILLIN AND TAZOBACTAM 25 GRAM(S): 4; .5 INJECTION, POWDER, LYOPHILIZED, FOR SOLUTION INTRAVENOUS at 00:12

## 2019-01-08 RX ADMIN — Medication 3: at 10:48

## 2019-01-08 RX ADMIN — Medication 10 MILLIGRAM(S): at 01:28

## 2019-01-08 RX ADMIN — Medication 4 UNIT(S): at 01:28

## 2019-01-08 RX ADMIN — SODIUM CHLORIDE 4 MILLILITER(S): 9 INJECTION INTRAMUSCULAR; INTRAVENOUS; SUBCUTANEOUS at 22:52

## 2019-01-08 RX ADMIN — Medication 6: at 18:11

## 2019-01-08 RX ADMIN — Medication 3: at 05:29

## 2019-01-08 RX ADMIN — Medication 6: at 22:12

## 2019-01-08 RX ADMIN — HUMAN INSULIN 10 UNIT(S): 100 INJECTION, SUSPENSION SUBCUTANEOUS at 18:11

## 2019-01-08 RX ADMIN — Medication 3: at 14:06

## 2019-01-08 RX ADMIN — FENTANYL CITRATE 28.05 MICROGRAM(S)/KG/HR: 50 INJECTION INTRAVENOUS at 08:09

## 2019-01-08 RX ADMIN — ALBUTEROL 1 PUFF(S): 90 AEROSOL, METERED ORAL at 15:17

## 2019-01-08 RX ADMIN — HEPARIN SODIUM 5000 UNIT(S): 5000 INJECTION INTRAVENOUS; SUBCUTANEOUS at 22:12

## 2019-01-08 RX ADMIN — HUMAN INSULIN 10 UNIT(S): 100 INJECTION, SUSPENSION SUBCUTANEOUS at 23:04

## 2019-01-08 RX ADMIN — CHLORHEXIDINE GLUCONATE 1 APPLICATION(S): 213 SOLUTION TOPICAL at 10:31

## 2019-01-08 RX ADMIN — Medication 50 MILLIGRAM(S): at 05:19

## 2019-01-08 RX ADMIN — SODIUM CHLORIDE 4 MILLILITER(S): 9 INJECTION INTRAMUSCULAR; INTRAVENOUS; SUBCUTANEOUS at 15:17

## 2019-01-08 RX ADMIN — HEPARIN SODIUM 5000 UNIT(S): 5000 INJECTION INTRAVENOUS; SUBCUTANEOUS at 13:55

## 2019-01-08 RX ADMIN — Medication 30 MILLIGRAM(S): at 05:17

## 2019-01-08 RX ADMIN — Medication 50 MILLIGRAM(S): at 22:12

## 2019-01-08 RX ADMIN — HEPARIN SODIUM 5000 UNIT(S): 5000 INJECTION INTRAVENOUS; SUBCUTANEOUS at 05:17

## 2019-01-08 RX ADMIN — PROPOFOL 3.37 MICROGRAM(S)/KG/MIN: 10 INJECTION, EMULSION INTRAVENOUS at 08:40

## 2019-01-08 RX ADMIN — PIPERACILLIN AND TAZOBACTAM 25 GRAM(S): 4; .5 INJECTION, POWDER, LYOPHILIZED, FOR SOLUTION INTRAVENOUS at 17:32

## 2019-01-08 RX ADMIN — Medication 30 MILLIGRAM(S): at 18:10

## 2019-01-08 RX ADMIN — CHLORHEXIDINE GLUCONATE 15 MILLILITER(S): 213 SOLUTION TOPICAL at 18:10

## 2019-01-08 RX ADMIN — Medication 50 MILLIGRAM(S): at 00:12

## 2019-01-08 RX ADMIN — Medication 1000 MILLIGRAM(S): at 21:00

## 2019-01-08 RX ADMIN — Medication 50 MILLIGRAM(S): at 13:55

## 2019-01-08 RX ADMIN — PIPERACILLIN AND TAZOBACTAM 25 GRAM(S): 4; .5 INJECTION, POWDER, LYOPHILIZED, FOR SOLUTION INTRAVENOUS at 09:23

## 2019-01-08 RX ADMIN — PANTOPRAZOLE SODIUM 40 MILLIGRAM(S): 20 TABLET, DELAYED RELEASE ORAL at 11:59

## 2019-01-08 RX ADMIN — PROPOFOL 3.37 MICROGRAM(S)/KG/MIN: 10 INJECTION, EMULSION INTRAVENOUS at 08:09

## 2019-01-08 RX ADMIN — ALBUTEROL 1 PUFF(S): 90 AEROSOL, METERED ORAL at 22:44

## 2019-01-08 RX ADMIN — SODIUM CHLORIDE 4 MILLILITER(S): 9 INJECTION INTRAMUSCULAR; INTRAVENOUS; SUBCUTANEOUS at 03:50

## 2019-01-08 NOTE — PROGRESS NOTE ADULT - ASSESSMENT
63 F with PMH of COPD (on home O2 (3L), hypothyroidism, T2DM (HbA1c 8s 11/2018, on insulin), RA on prednisone (10 mg daily x 30 years), HTN,  admitted for SBO s/p ex lap with lysis of adhesions, now intubated for hypercapnic respiratory failure. Patient is on Glucerna tube feeds (18 hour duration).      endocrine called for DM management

## 2019-01-08 NOTE — PROGRESS NOTE ADULT - PROBLEM SELECTOR PLAN 1
Recommend change Glucerna from 18 hours to 24 hour feeds to facilitate glycemic control. Can change to Glucerna @ 41 cc/hour x 24 hours for equivalent volume per day.  -Increase NPH to 10 units q6h  -Increase correction scale to moderate Humalog scale q4h  Goal glucose 100-180

## 2019-01-08 NOTE — PROGRESS NOTE ADULT - ATTENDING COMMENTS
Above noted. Remains intubated. Currently on FiO2 60%. Significant diuresis took place yesterday, received 40 mg of Lasix IV this afternoon.  Chest X-ray improved.  Plan: Continue present management.

## 2019-01-08 NOTE — PROGRESS NOTE ADULT - SUBJECTIVE AND OBJECTIVE BOX
SICU Progress Note    24 HOUR EVENTS: Failed SBT in morning. Severe ARDS. Patient PEEP increased to 10. Fi02 decreased from 80 to 70. Insulin drip started for persistently elevated FS >300. LAntus increased from 10 to 15. Diuresed 80mg then 40 in the afternoon with good response.       HISTORY   63 year old woman with COPD on home O2 (3L), moderate pulmonary HTN, DM, RA on prednisone (10 mg daily x 30 years), HTN, s/p CVA (no residual deficit), s/p  x2 presents with abdominal pain for one day.  She developed mid abdominal cramping pain, sharp, last evening that has been constant and moderate to severe.  It was associated with multiple episodes of nonbloody, nonbilious emesis.  She had a normal bowel movement this morning and has not passed flatus today.  CT scan shows SBO with transition point in the lower abdomen with associated interloop fluid and mesenteric edema. (15 Dec 2018 23:33)  She underwent surgery with 4 retention sutures, evicerated on  when coughing, needing 6 more retention sutures. She then had multiple febrile episodes and underwent CT c/a/p which showed multifocal pneumonia. SHe has been on bipap intermittently during the sicu stay but was stepped down to floors on 1/3. She was then put on bipap on RCU. Consulted SICU for worsening resp status despite continuous bipap  with fevers. +flu with pneumonia.     Vital Signs Last 24 Hrs  T(C): 37.3 (2019 00:00), Max: 38 (2019 20:00)  T(F): 99.1 (2019 00:00), Max: 100.4 (2019 20:00)  HR: 64 (2019 01:30) (58 - 97)  BP: 153/92 (2019 01:30) (153/92 - 153/92)  BP(mean): 108 (2019 01:30) (108 - 108)  RR: 20 (2019 01:00) (14 - 20)  SpO2: 97% (2019 01:30) (92% - 100%)    Physical Exam:  General Appearance: Appears well, NAD  Respiratory: Ventilated, wheezing, crackles heard bilaterally   S1, S2.  Regular rate and rhythm.  Peripheral edema    GI: 10 retention sutures in place, mild erythema / erosion of sutures    LABS:                        7.6    9.47  )-----------( 497      ( 2019 00:00 )             24.3         139  |  97<L>  |  18  ----------------------------<  241<H>  4.3   |  29  |  0.91    Ca    9.2      2019 00:00  Phos  2.8       Mg     1.8         TPro  6.4  /  Alb  2.8<L>  /  TBili  0.2  /  DBili  x   /  AST  57<H>  /  ALT  40<H>  /  AlkPhos  177<H>      PT/INR - ( 2019 03:20 )   PT: 11.7 SEC;   INR: 1.05          PTT - ( 2019 03:20 )  PTT:26.5 SEC      INs and OUTs:    19 @ 07:19 @ 07:00  --------------------------------------------------------  IN: 998.5 mL / OUT: 642 mL / NET: 356.5 mL    19 @ 07:19 @ 01:37  --------------------------------------------------------  IN: 936 mL / OUT: 2610 mL / NET: -1674 mL

## 2019-01-08 NOTE — PROGRESS NOTE ADULT - PROBLEM SELECTOR PLAN 3
currently on hydrocortisone 50 q8 for stress dose steroids given critically ill state (pt is on pred 10mg chronically)

## 2019-01-08 NOTE — PROGRESS NOTE ADULT - ATTENDING COMMENTS
Attending Attestation:   Critical Care Dx    Acute Respiratory failure with chronic underlying COPD, hypoxic/hypercapneic   -P/F ration <100  -Flu +, on Tamiflu, bilateral infiltrates, requiring elevated PEEP  -CPAP trial, goal to lower Fi02 < 60%    Hyperglycemia  -Basal/bolus insulin regiment with corrective scale, frequent fingersticks. Endocrine consult prefers NPH due to steroid timing.   -con't tube feeds    Multifocal pneumonia, +Flu  -Zosyn and tamiflu ordered  -trend CXR, ABG's   -Bronch as needed if infiltrate seen    Hypothyroidism  -Con't IV conversion of synthroid     RA, COPD  -stress dose steroids given baseline requirement of prednisone and now with surgical/inflammatory stress.  -wean off stress dose today and continue to lower    At risk for malnutrition  -reached goal tube feedings but has been underfed in post-surgical time period  -monitor prealbumin weekly     The patient is a critical care patient with life threatening respiratory instability in SICU.  I have personally interviewed and examined the patient, reviewed data and laboratory tests/x-rays and all pertinent electronic images.  The SICU team has a constant risk benefit analyzes discussion with the primary team, all consultants, House Staff and PA's on all decisions.  These diagnoses are unrelated to the surgical procedure noted.  Time involved in performance of separately billable procedures was not counted toward my critical care time. There is no overlap.    I spent 50 minutes in total providing critical care for the diagnoses, assessment and plan above.      I was physically present for the key portions of the evaluation and management (E/M) service provided.  I agree with the above history, physical, and plan which I have reviewed and edited where appropriate.     Ryan Bear MD  Acute and Critical Care Surgery .

## 2019-01-08 NOTE — PROGRESS NOTE ADULT - ASSESSMENT
ASSESSMENT:  63y Female hx of COPD presented initially with SBO, underwent surgery closed with 4 retention sutures, evicerated on 12/30 and closed with 6 more retention sutures, now c/b multifocal pneumonia, flu and respiratory distress /hypoxia on continuous bipap. Patient temporarily transferred to RCU, but developed hypercapnia and respiratory distress requiring intubation 1/6 and subsequent transfer to SICU.     #neuro  Awake  - Sedation with fentanyl and precedex held this am for SBT  - Propofol started overnight  - Attempt sedation holiday in AM    #resp  - Intubated on AC. Attempting SBT this morning  - Will check ABG and trend PaO2/FiO2 ratio  - Hx COPD and pulm HTN, current smoker  - C/w duoneb, pulmonary toilet    #cv  Total body fluid overload  - Off pressors  - Vitals stable  - Restarted Nadolol   - Monitor A-line site, difficult Arterial access low axillary/brachial    #GI  - s/p ex-lap  for evisceration, retention sutures placed  - BM overnight  - c/w protonix daily  - c/w TF    #renal  - hx CKD  - BUN/Crt wnl  - C/w iraheta  - monitor I's&O's  - repletions PRN    #heme  - sqh for dvt ppx    #ID  - trend fevers  - c/w zosyn and tamiflu    #endo  - hx RA on chronic prednisone, DM  - continue hydrocortisone   - Lantus 10u increased to 15u, ISS    #dispo  -SICU      Critical Care Dx:  - Respiratory failure  - CHRIS  - Morbid obesity  - Evisceration  - Severe protein/calorie malnutrition  - Pulmonary HTN  - COPD  - Adrenal insufficiency

## 2019-01-08 NOTE — CHART NOTE - NSCHARTNOTEFT_GEN_A_CORE
NUTRITION FOLLOW-UP:    Pt seen for critical care nutrition f/u.  Pt is currently intubated/sedated since 1/6 2/2 worsening respiratory status.  Enteral nutrition initiated on 1/7.  Pt's current wt is 3kg below admission wt.  Noted pt w/2+generalized and 3+ L wrist and hand edema.      Weight:  1/8 - 109.2kg     1/6 - 106.8kg     1/3 - 118.9kg     Adm - 114.6kg     IBW - 47.7kg  Labs:  H/H 7.6/24.3  Glu 241  -324    Current Diet:  Glucerna 1.2 @55ml/h x18h via KFT  Enteral Recommendations:  TF providing 1188 kcal w/58.5gm protein.  Estimated kcal needs = 25kcal/kg IBW = 1200kcal/g.  Estimated protein needs = 1.5gm protein/kg IBW = 71.5gm/d.  Suggest add 1 pack prosource/d to increase protein intake to 73.5gm/d to meet current needs.     RD to Remain Available:  yes  Additional Recommendations:   1) Monitor weights, labs, BM's, skin integrity, FS, tolerance to TF  2) Continue Glucerna 1.2 @55ml/h x18h as tolerated.    3) Suggest add 1 pack prosource/d to meet protein needs.

## 2019-01-08 NOTE — PROGRESS NOTE ADULT - SUBJECTIVE AND OBJECTIVE BOX
Chief Complaint: DM2    History: Patient remains vent dependent. Receiving Glucerna @ 55 cc/hour x 18 hours. Noted with hyperglycemia required insulin drip for several hours yesterday evening.    MEDICATIONS  (STANDING):  ALBUTerol    90 MICROgram(s) HFA Inhaler 1 Puff(s) Inhalation every 6 hours  amLODIPine   Tablet 5 milliGRAM(s) Oral daily  buDESOnide 160 MICROgram(s)/formoterol 4.5 MICROgram(s) Inhaler 2 Puff(s) Inhalation two times a day  chlorhexidine 0.12% Liquid 15 milliLiter(s) Swish and Spit two times a day  chlorhexidine 4% Liquid 1 Application(s) Topical <User Schedule>  dextrose 50% Injectable 25 milliLiter(s) IV Push once  fentaNYL   Infusion 2.5 MICROgram(s)/kG/Hr (28.05 mL/Hr) IV Continuous <Continuous>  furosemide   Injectable 40 milliGRAM(s) IV Push two times a day  heparin  Injectable 5000 Unit(s) SubCutaneous every 8 hours  hydrocortisone sodium succinate Injectable 50 milliGRAM(s) IV Push every 8 hours  influenza   Vaccine 0.5 milliLiter(s) IntraMuscular once  insulin lispro (HumaLOG) corrective regimen sliding scale   SubCutaneous every 4 hours  insulin NPH human recombinant 10 Unit(s) SubCutaneous every 6 hours  levothyroxine Injectable 96 MICROGram(s) IV Push at bedtime  metoprolol tartrate Injectable 5 milliGRAM(s) IV Push every 6 hours  oseltamivir 30 milliGRAM(s) Oral two times a day  pantoprazole  Injectable 40 milliGRAM(s) IV Push daily  piperacillin/tazobactam IVPB. 3.375 Gram(s) IV Intermittent every 8 hours  propofol Infusion 5 MICROgram(s)/kG/Min (3.366 mL/Hr) IV Continuous <Continuous>  simvastatin 20 milliGRAM(s) Oral at bedtime  sodium chloride 3%  Inhalation 4 milliLiter(s) Inhalation every 6 hours    MEDICATIONS  (PRN):      Allergies    No Known Allergies    Intolerances      Review of Systems:    UNABLE TO OBTAIN    PHYSICAL EXAM:  VITALS: T(C): 36.9 (01-08-19 @ 12:00)  T(F): 98.5 (01-08-19 @ 12:00), Max: 100.8 (01-08-19 @ 04:00)  HR: 83 (01-08-19 @ 15:18) (56 - 133)  BP: 116/66 (01-08-19 @ 08:14) (116/66 - 153/92)  RR:  (18 - 23)  SpO2:  (90% - 100%)  Wt(kg): --  GENERAL: NAD, cushingoid appearance  HEENT:  Atraumatic, Normocephalic, ETT on vent  RESPIRATORY: on vent, no wheezing  PSYCH: cannot assess      CAPILLARY BLOOD GLUCOSE      POCT Blood Glucose.: 287 mg/dL (08 Jan 2019 13:51)  POCT Blood Glucose.: 282 mg/dL (08 Jan 2019 10:30)  POCT Blood Glucose.: 239 mg/dL (08 Jan 2019 08:46)  POCT Blood Glucose.: 258 mg/dL (08 Jan 2019 05:26)  POCT Blood Glucose.: 276 mg/dL (08 Jan 2019 01:08)  POCT Blood Glucose.: 270 mg/dL (08 Jan 2019 01:07)  POCT Blood Glucose.: 203 mg/dL (07 Jan 2019 23:48)  POCT Blood Glucose.: 158 mg/dL (07 Jan 2019 23:03)  POCT Blood Glucose.: 146 mg/dL (07 Jan 2019 22:15)  POCT Blood Glucose.: 173 mg/dL (07 Jan 2019 20:53)  POCT Blood Glucose.: 203 mg/dL (07 Jan 2019 19:58)  POCT Blood Glucose.: 268 mg/dL (07 Jan 2019 18:57)  POCT Blood Glucose.: 324 mg/dL (07 Jan 2019 17:16)      01-08    139  |  97<L>  |  18  ----------------------------<  241<H>  4.3   |  29  |  0.91    EGFR if : 79  EGFR if non : 68    Ca    9.2      01-08  Mg     1.8     01-08  Phos  2.8     01-08    TPro  6.4  /  Alb  2.8<L>  /  TBili  0.2  /  DBili  x   /  AST  57<H>  /  ALT  40<H>  /  AlkPhos  177<H>  01-07          Thyroid Function Tests:  12-27 @ 07:15 TSH -- FreeT4 1.18 T3 -- Anti TPO -- Anti Thyroglobulin Ab -- TSI --  12-20 @ 06:45 TSH 40.15 FreeT4 0.98 T3 -- Anti TPO -- Anti Thyroglobulin Ab -- TSI --      Hemoglobin A1C, Whole Blood: 7.1 % <H> [4.0 - 5.6] (12-17-18 @ 03:50)  Hemoglobin A1C, Whole Blood: 7.3 % <H> [4.0 - 5.6] (12-16-18 @ 04:17)

## 2019-01-08 NOTE — PROGRESS NOTE ADULT - ASSESSMENT
63F w/ multiple respiratory comorbidities p/w SBO s/p ex-lap with findings of healthy bowel c/b evisceration of bowel s/p ex-lap with placement of retention sutures with increasing respiratory demand on Bipap.      Plan  - Pain control  - NPO w/ tube feeds  - C/w Zosyn abx for multifocal pna  - Monitor GI fxn   - Monitor respiratory status  - DVT ppx: subQ heparin  - Follow care plan per SICU team

## 2019-01-08 NOTE — PROGRESS NOTE ADULT - SUBJECTIVE AND OBJECTIVE BOX
A-Team Surgery Progress Note     Subjective/24hour Events: Patient intubated and sedated. Precedex stopped and propofol started overnight. Tolerating tube feeds.    Vital Signs:  Vital Signs Last 24 Hrs  T(C): 38.2 (08 Jan 2019 04:00), Max: 38.2 (08 Jan 2019 04:00)  T(F): 100.8 (08 Jan 2019 04:00), Max: 100.8 (08 Jan 2019 04:00)  HR: 97 (08 Jan 2019 09:35) (57 - 126)  BP: 116/66 (08 Jan 2019 08:14) (116/66 - 153/92)  BP(mean): 78 (08 Jan 2019 08:14) (78 - 108)  RR: 20 (08 Jan 2019 09:00) (18 - 20)  SpO2: 97% (08 Jan 2019 09:35) (91% - 100%)    CAPILLARY BLOOD GLUCOSE      POCT Blood Glucose.: 239 mg/dL (08 Jan 2019 08:46)  POCT Blood Glucose.: 258 mg/dL (08 Jan 2019 05:26)  POCT Blood Glucose.: 276 mg/dL (08 Jan 2019 01:08)  POCT Blood Glucose.: 270 mg/dL (08 Jan 2019 01:07)  POCT Blood Glucose.: 203 mg/dL (07 Jan 2019 23:48)  POCT Blood Glucose.: 158 mg/dL (07 Jan 2019 23:03)  POCT Blood Glucose.: 146 mg/dL (07 Jan 2019 22:15)  POCT Blood Glucose.: 173 mg/dL (07 Jan 2019 20:53)  POCT Blood Glucose.: 203 mg/dL (07 Jan 2019 19:58)  POCT Blood Glucose.: 268 mg/dL (07 Jan 2019 18:57)  POCT Blood Glucose.: 324 mg/dL (07 Jan 2019 17:16)  POCT Blood Glucose.: 304 mg/dL (07 Jan 2019 11:34)      I&O's Detail    07 Jan 2019 07:01  -  08 Jan 2019 07:00  --------------------------------------------------------  IN:    dexmedetomidine Infusion: 235.2 mL    fentaNYL  Infusion: 380.8 mL    fentaNYL  Infusion: 196.7 mL    insulin Infusion: 6 mL    insulin Infusion: 8 mL    insulin Infusion: 4 mL    insulin Infusion: 2 mL    IV PiggyBack: 350 mL    propofol Infusion: 74.2 mL  Total IN: 1256.9 mL    OUT:    Indwelling Catheter - Urethral: 2800 mL  Total OUT: 2800 mL    Total NET: -1543.1 mL      08 Jan 2019 07:01  -  08 Jan 2019 10:19  --------------------------------------------------------  IN:    fentaNYL  Infusion: 61.8 mL    Glucerna: 110 mL    IV PiggyBack: 100 mL    propofol Infusion: 30.3 mL  Total IN: 302.1 mL    OUT:    Indwelling Catheter - Urethral: 65 mL  Total OUT: 65 mL    Total NET: 237.1 mL            MEDICATIONS  (STANDING):  ALBUTerol    90 MICROgram(s) HFA Inhaler 1 Puff(s) Inhalation every 6 hours  amLODIPine   Tablet 5 milliGRAM(s) Oral daily  buDESOnide 160 MICROgram(s)/formoterol 4.5 MICROgram(s) Inhaler 2 Puff(s) Inhalation two times a day  chlorhexidine 0.12% Liquid 15 milliLiter(s) Swish and Spit two times a day  chlorhexidine 4% Liquid 1 Application(s) Topical <User Schedule>  dextrose 50% Injectable 25 milliLiter(s) IV Push once  fentaNYL   Infusion 2.5 MICROgram(s)/kG/Hr (28.05 mL/Hr) IV Continuous <Continuous>  furosemide   Injectable 40 milliGRAM(s) IV Push two times a day  heparin  Injectable 5000 Unit(s) SubCutaneous every 8 hours  hydrocortisone sodium succinate Injectable 50 milliGRAM(s) IV Push every 8 hours  influenza   Vaccine 0.5 milliLiter(s) IntraMuscular once  insulin glargine Injectable (LANTUS) 15 Unit(s) SubCutaneous at bedtime  insulin lispro (HumaLOG) corrective regimen sliding scale   SubCutaneous every 4 hours  insulin NPH human recombinant 6 Unit(s) SubCutaneous <User Schedule>  levothyroxine Injectable 96 MICROGram(s) IV Push at bedtime  metoprolol tartrate Injectable 5 milliGRAM(s) IV Push every 6 hours  oseltamivir 30 milliGRAM(s) Oral two times a day  pantoprazole  Injectable 40 milliGRAM(s) IV Push daily  piperacillin/tazobactam IVPB. 3.375 Gram(s) IV Intermittent every 8 hours  propofol Infusion 5 MICROgram(s)/kG/Min (3.366 mL/Hr) IV Continuous <Continuous>  simvastatin 20 milliGRAM(s) Oral at bedtime  sodium chloride 3%  Inhalation 4 milliLiter(s) Inhalation every 6 hours    MEDICATIONS  (PRN):        Physical Exam:  Gen: NAD  HEENT: intubated  LS: nml respiratory effort  Card: pulse regularly present  GI: abd soft, midline incision w/ retention sutures, aquacel packing, dressing, c/d/i, and abd binder in place  Groin: iraheta in place  Ext: warm      Labs:    01-08    139  |  97<L>  |  18  ----------------------------<  241<H>  4.3   |  29  |  0.91    Ca    9.2      08 Jan 2019 00:00  Phos  2.8     01-08  Mg     1.8     01-08    TPro  6.4  /  Alb  2.8<L>  /  TBili  0.2  /  DBili  x   /  AST  57<H>  /  ALT  40<H>  /  AlkPhos  177<H>  01-07    LIVER FUNCTIONS - ( 07 Jan 2019 02:45 )  Alb: 2.8 g/dL / Pro: 6.4 g/dL / ALK PHOS: 177 u/L / ALT: 40 u/L / AST: 57 u/L / GGT: x                                 7.6    9.47  )-----------( 497      ( 08 Jan 2019 00:00 )             24.3               Imaging:  EXAM:  XR CHEST PORTABLE ROUTINE 1V      EXAM:  XR CHEST PORTABLE URGENT 1V      PROCEDURE DATE:  Jan 6 2019     INTERPRETATION:  TIME OF EXAM: January 6, 2019 at 1:09 PM; January 7 at   12:08 AM    CLINICAL INFORMATION: NG tube placement; dyspnea.    TECHNIQUE:   Portable chest    INTERPRETATION:     January 6 at 1:09 PM:  Since the last study, an enteric tube has been introduced and its tip is   in the body of the stomach. Bilateral effusions about the same as the   last exam. The endotracheal tube and right IJ line are unchanged.    January 7 at 12:08 AM:  Tip of the enteric tube is just distal to the cardioesophageal junction.   Hazy lung bases has the appearance of small effusions with underlying   atelectasis. The endotracheal tube and right IJ line are unchanged. Mild   interstitial edema suggested.    COMPARISON:  January 6 at 8:24 AM    IMPRESSION:    1. NG tube as described above on subsequent radiographs.  2. Bilateral small effusions likely due to underlying pulmonary edema.

## 2019-01-09 LAB
ANION GAP SERPL CALC-SCNC: 14 MEQ/L — SIGNIFICANT CHANGE UP (ref 7–14)
ANION GAP SERPL CALC-SCNC: 15 MEQ/L — HIGH (ref 7–14)
BASE EXCESS BLDA CALC-SCNC: 7.4 MMOL/L — SIGNIFICANT CHANGE UP
BASE EXCESS BLDA CALC-SCNC: 7.7 MMOL/L — SIGNIFICANT CHANGE UP
BASE EXCESS BLDA CALC-SCNC: 8.3 MMOL/L — SIGNIFICANT CHANGE UP
BASE EXCESS BLDA CALC-SCNC: 9.3 MMOL/L — SIGNIFICANT CHANGE UP
BLD GP AB SCN SERPL QL: NEGATIVE — SIGNIFICANT CHANGE UP
BUN SERPL-MCNC: 21 MG/DL — SIGNIFICANT CHANGE UP (ref 7–23)
BUN SERPL-MCNC: 22 MG/DL — SIGNIFICANT CHANGE UP (ref 7–23)
CA-I BLDA-SCNC: 1.18 MMOL/L — SIGNIFICANT CHANGE UP (ref 1.15–1.29)
CA-I BLDA-SCNC: 1.21 MMOL/L — SIGNIFICANT CHANGE UP (ref 1.15–1.29)
CA-I BLDA-SCNC: 1.22 MMOL/L — SIGNIFICANT CHANGE UP (ref 1.15–1.29)
CALCIUM SERPL-MCNC: 9.1 MG/DL — SIGNIFICANT CHANGE UP (ref 8.4–10.5)
CALCIUM SERPL-MCNC: 9.2 MG/DL — SIGNIFICANT CHANGE UP (ref 8.4–10.5)
CHLORIDE SERPL-SCNC: 99 MMOL/L — SIGNIFICANT CHANGE UP (ref 98–107)
CHLORIDE SERPL-SCNC: 99 MMOL/L — SIGNIFICANT CHANGE UP (ref 98–107)
CO2 SERPL-SCNC: 29 MMOL/L — SIGNIFICANT CHANGE UP (ref 22–31)
CO2 SERPL-SCNC: 30 MMOL/L — SIGNIFICANT CHANGE UP (ref 22–31)
CREAT SERPL-MCNC: 1.04 MG/DL — SIGNIFICANT CHANGE UP (ref 0.5–1.3)
CREAT SERPL-MCNC: 1.08 MG/DL — SIGNIFICANT CHANGE UP (ref 0.5–1.3)
GLUCOSE BLDA-MCNC: 151 MG/DL — HIGH (ref 70–99)
GLUCOSE BLDA-MCNC: 209 MG/DL — HIGH (ref 70–99)
GLUCOSE BLDA-MCNC: 244 MG/DL — HIGH (ref 70–99)
GLUCOSE BLDC GLUCOMTR-MCNC: 155 MG/DL — HIGH (ref 70–99)
GLUCOSE BLDC GLUCOMTR-MCNC: 177 MG/DL — HIGH (ref 70–99)
GLUCOSE BLDC GLUCOMTR-MCNC: 189 MG/DL — HIGH (ref 70–99)
GLUCOSE BLDC GLUCOMTR-MCNC: 194 MG/DL — HIGH (ref 70–99)
GLUCOSE BLDC GLUCOMTR-MCNC: 240 MG/DL — HIGH (ref 70–99)
GLUCOSE BLDC GLUCOMTR-MCNC: 255 MG/DL — HIGH (ref 70–99)
GLUCOSE BLDC GLUCOMTR-MCNC: 273 MG/DL — HIGH (ref 70–99)
GLUCOSE SERPL-MCNC: 156 MG/DL — HIGH (ref 70–99)
GLUCOSE SERPL-MCNC: 264 MG/DL — HIGH (ref 70–99)
GRAM STN SPT: SIGNIFICANT CHANGE UP
HCO3 BLDA-SCNC: 31 MMOL/L — HIGH (ref 22–26)
HCO3 BLDA-SCNC: 31 MMOL/L — HIGH (ref 22–26)
HCO3 BLDA-SCNC: 32 MMOL/L — HIGH (ref 22–26)
HCO3 BLDA-SCNC: 33 MMOL/L — HIGH (ref 22–26)
HCT VFR BLD CALC: 21.4 % — LOW (ref 34.5–45)
HCT VFR BLD CALC: 23.5 % — LOW (ref 34.5–45)
HCT VFR BLD CALC: 27.6 % — LOW (ref 34.5–45)
HCT VFR BLDA CALC: 21.4 % — LOW (ref 34.5–46.5)
HCT VFR BLDA CALC: 27.6 % — LOW (ref 34.5–46.5)
HCT VFR BLDA CALC: 27.9 % — LOW (ref 34.5–46.5)
HGB BLD-MCNC: 6.8 G/DL — CRITICAL LOW (ref 11.5–15.5)
HGB BLD-MCNC: 7.5 G/DL — LOW (ref 11.5–15.5)
HGB BLD-MCNC: 8.8 G/DL — LOW (ref 11.5–15.5)
HGB BLDA-MCNC: 6.8 G/DL — CRITICAL LOW (ref 11.5–15.5)
HGB BLDA-MCNC: 8.9 G/DL — LOW (ref 11.5–15.5)
HGB BLDA-MCNC: 9 G/DL — LOW (ref 11.5–15.5)
LACTATE BLDA-SCNC: 1.8 MMOL/L — SIGNIFICANT CHANGE UP (ref 0.5–2)
LACTATE BLDA-SCNC: 2.2 MMOL/L — HIGH (ref 0.5–2)
LACTATE BLDA-SCNC: 2.3 MMOL/L — HIGH (ref 0.5–2)
MAGNESIUM SERPL-MCNC: 2.1 MG/DL — SIGNIFICANT CHANGE UP (ref 1.6–2.6)
MAGNESIUM SERPL-MCNC: 2.1 MG/DL — SIGNIFICANT CHANGE UP (ref 1.6–2.6)
MCHC RBC-ENTMCNC: 29.8 PG — SIGNIFICANT CHANGE UP (ref 27–34)
MCHC RBC-ENTMCNC: 30.1 PG — SIGNIFICANT CHANGE UP (ref 27–34)
MCHC RBC-ENTMCNC: 31.1 PG — SIGNIFICANT CHANGE UP (ref 27–34)
MCHC RBC-ENTMCNC: 31.8 % — LOW (ref 32–36)
MCHC RBC-ENTMCNC: 31.9 % — LOW (ref 32–36)
MCHC RBC-ENTMCNC: 31.9 % — LOW (ref 32–36)
MCV RBC AUTO: 93.6 FL — SIGNIFICANT CHANGE UP (ref 80–100)
MCV RBC AUTO: 94.4 FL — SIGNIFICANT CHANGE UP (ref 80–100)
MCV RBC AUTO: 97.7 FL — SIGNIFICANT CHANGE UP (ref 80–100)
NRBC # FLD: 0.2 K/UL — LOW (ref 25–125)
NRBC # FLD: 0.25 K/UL — LOW (ref 25–125)
NRBC # FLD: 0.39 K/UL — LOW (ref 25–125)
NRBC FLD-RTO: 1.9 — SIGNIFICANT CHANGE UP
NRBC FLD-RTO: 2.3 — SIGNIFICANT CHANGE UP
NRBC FLD-RTO: 2.7 — SIGNIFICANT CHANGE UP
PCO2 BLDA: 44 MMHG — SIGNIFICANT CHANGE UP (ref 32–48)
PCO2 BLDA: 47 MMHG — SIGNIFICANT CHANGE UP (ref 32–48)
PCO2 BLDA: 48 MMHG — SIGNIFICANT CHANGE UP (ref 32–48)
PCO2 BLDA: 50 MMHG — HIGH (ref 32–48)
PH BLDA: 7.44 PH — SIGNIFICANT CHANGE UP (ref 7.35–7.45)
PH BLDA: 7.45 PH — SIGNIFICANT CHANGE UP (ref 7.35–7.45)
PH BLDA: 7.45 PH — SIGNIFICANT CHANGE UP (ref 7.35–7.45)
PH BLDA: 7.47 PH — HIGH (ref 7.35–7.45)
PHOSPHATE SERPL-MCNC: 2.6 MG/DL — SIGNIFICANT CHANGE UP (ref 2.5–4.5)
PHOSPHATE SERPL-MCNC: 3 MG/DL — SIGNIFICANT CHANGE UP (ref 2.5–4.5)
PLATELET # BLD AUTO: 461 K/UL — HIGH (ref 150–400)
PLATELET # BLD AUTO: 498 K/UL — HIGH (ref 150–400)
PLATELET # BLD AUTO: 599 K/UL — HIGH (ref 150–400)
PMV BLD: 9.4 FL — SIGNIFICANT CHANGE UP (ref 7–13)
PMV BLD: 9.5 FL — SIGNIFICANT CHANGE UP (ref 7–13)
PMV BLD: 9.6 FL — SIGNIFICANT CHANGE UP (ref 7–13)
PO2 BLDA: 56 MMHG — LOW (ref 83–108)
PO2 BLDA: 63 MMHG — LOW (ref 83–108)
PO2 BLDA: 68 MMHG — LOW (ref 83–108)
PO2 BLDA: 71 MMHG — LOW (ref 83–108)
POTASSIUM BLDA-SCNC: 3 MMOL/L — LOW (ref 3.4–4.5)
POTASSIUM BLDA-SCNC: 3.3 MMOL/L — LOW (ref 3.4–4.5)
POTASSIUM BLDA-SCNC: 3.3 MMOL/L — LOW (ref 3.4–4.5)
POTASSIUM SERPL-MCNC: 3.2 MMOL/L — LOW (ref 3.5–5.3)
POTASSIUM SERPL-MCNC: 3.7 MMOL/L — SIGNIFICANT CHANGE UP (ref 3.5–5.3)
POTASSIUM SERPL-SCNC: 3.2 MMOL/L — LOW (ref 3.5–5.3)
POTASSIUM SERPL-SCNC: 3.7 MMOL/L — SIGNIFICANT CHANGE UP (ref 3.5–5.3)
RBC # BLD: 2.19 M/UL — LOW (ref 3.8–5.2)
RBC # BLD: 2.49 M/UL — LOW (ref 3.8–5.2)
RBC # BLD: 2.95 M/UL — LOW (ref 3.8–5.2)
RBC # FLD: 15.8 % — HIGH (ref 10.3–14.5)
RBC # FLD: 16 % — HIGH (ref 10.3–14.5)
RBC # FLD: 16.4 % — HIGH (ref 10.3–14.5)
RH IG SCN BLD-IMP: POSITIVE — SIGNIFICANT CHANGE UP
SAO2 % BLDA: 87.9 % — LOW (ref 95–99)
SAO2 % BLDA: 88.2 % — LOW (ref 95–99)
SAO2 % BLDA: 93.8 % — LOW (ref 95–99)
SAO2 % BLDA: 93.9 % — LOW (ref 95–99)
SODIUM BLDA-SCNC: 142 MMOL/L — SIGNIFICANT CHANGE UP (ref 136–146)
SODIUM BLDA-SCNC: 143 MMOL/L — SIGNIFICANT CHANGE UP (ref 136–146)
SODIUM BLDA-SCNC: 143 MMOL/L — SIGNIFICANT CHANGE UP (ref 136–146)
SODIUM SERPL-SCNC: 143 MMOL/L — SIGNIFICANT CHANGE UP (ref 135–145)
SODIUM SERPL-SCNC: 143 MMOL/L — SIGNIFICANT CHANGE UP (ref 135–145)
SPECIMEN SOURCE: SIGNIFICANT CHANGE UP
WBC # BLD: 10.56 K/UL — HIGH (ref 3.8–10.5)
WBC # BLD: 10.7 K/UL — HIGH (ref 3.8–10.5)
WBC # BLD: 14.71 K/UL — HIGH (ref 3.8–10.5)
WBC # FLD AUTO: 10.56 K/UL — HIGH (ref 3.8–10.5)
WBC # FLD AUTO: 10.7 K/UL — HIGH (ref 3.8–10.5)
WBC # FLD AUTO: 14.71 K/UL — HIGH (ref 3.8–10.5)

## 2019-01-09 PROCEDURE — 71045 X-RAY EXAM CHEST 1 VIEW: CPT | Mod: 26,76

## 2019-01-09 PROCEDURE — 31624 DX BRONCHOSCOPE/LAVAGE: CPT | Mod: GC

## 2019-01-09 PROCEDURE — 99292 CRITICAL CARE ADDL 30 MIN: CPT | Mod: 25

## 2019-01-09 PROCEDURE — 99291 CRITICAL CARE FIRST HOUR: CPT | Mod: 25

## 2019-01-09 RX ORDER — HYDROCORTISONE 20 MG
50 TABLET ORAL EVERY 12 HOURS
Qty: 0 | Refills: 0 | Status: DISCONTINUED | OUTPATIENT
Start: 2019-01-09 | End: 2019-01-11

## 2019-01-09 RX ORDER — FENTANYL CITRATE 50 UG/ML
0.5 INJECTION INTRAVENOUS
Qty: 2500 | Refills: 0 | Status: DISCONTINUED | OUTPATIENT
Start: 2019-01-09 | End: 2019-01-11

## 2019-01-09 RX ORDER — ACETAMINOPHEN 500 MG
1000 TABLET ORAL ONCE
Qty: 0 | Refills: 0 | Status: COMPLETED | OUTPATIENT
Start: 2019-01-09 | End: 2019-01-09

## 2019-01-09 RX ORDER — POTASSIUM CHLORIDE 20 MEQ
20 PACKET (EA) ORAL ONCE
Qty: 0 | Refills: 0 | Status: DISCONTINUED | OUTPATIENT
Start: 2019-01-09 | End: 2019-01-09

## 2019-01-09 RX ORDER — LIDOCAINE HCL 20 MG/ML
10 VIAL (ML) INJECTION ONCE
Qty: 0 | Refills: 0 | Status: COMPLETED | OUTPATIENT
Start: 2019-01-09 | End: 2019-01-09

## 2019-01-09 RX ORDER — FENTANYL CITRATE 50 UG/ML
0.27 INJECTION INTRAVENOUS
Qty: 2500 | Refills: 0 | Status: DISCONTINUED | OUTPATIENT
Start: 2019-01-09 | End: 2019-01-09

## 2019-01-09 RX ORDER — METOPROLOL TARTRATE 50 MG
5 TABLET ORAL EVERY 6 HOURS
Qty: 0 | Refills: 0 | Status: DISCONTINUED | OUTPATIENT
Start: 2019-01-09 | End: 2019-01-10

## 2019-01-09 RX ORDER — AMLODIPINE BESYLATE 2.5 MG/1
10 TABLET ORAL DAILY
Qty: 0 | Refills: 0 | Status: DISCONTINUED | OUTPATIENT
Start: 2019-01-09 | End: 2019-02-01

## 2019-01-09 RX ORDER — POTASSIUM CHLORIDE 20 MEQ
20 PACKET (EA) ORAL ONCE
Qty: 0 | Refills: 0 | Status: COMPLETED | OUTPATIENT
Start: 2019-01-09 | End: 2019-01-09

## 2019-01-09 RX ORDER — POTASSIUM CHLORIDE 20 MEQ
40 PACKET (EA) ORAL ONCE
Qty: 0 | Refills: 0 | Status: COMPLETED | OUTPATIENT
Start: 2019-01-09 | End: 2019-01-09

## 2019-01-09 RX ORDER — DEXMEDETOMIDINE HYDROCHLORIDE IN 0.9% SODIUM CHLORIDE 4 UG/ML
0.3 INJECTION INTRAVENOUS
Qty: 200 | Refills: 0 | Status: DISCONTINUED | OUTPATIENT
Start: 2019-01-09 | End: 2019-01-12

## 2019-01-09 RX ORDER — FENTANYL CITRATE 50 UG/ML
4.46 INJECTION INTRAVENOUS
Qty: 2500 | Refills: 0 | Status: DISCONTINUED | OUTPATIENT
Start: 2019-01-09 | End: 2019-01-09

## 2019-01-09 RX ADMIN — PANTOPRAZOLE SODIUM 40 MILLIGRAM(S): 20 TABLET, DELAYED RELEASE ORAL at 12:16

## 2019-01-09 RX ADMIN — Medication 5 MILLIGRAM(S): at 12:17

## 2019-01-09 RX ADMIN — SODIUM CHLORIDE 4 MILLILITER(S): 9 INJECTION INTRAMUSCULAR; INTRAVENOUS; SUBCUTANEOUS at 15:23

## 2019-01-09 RX ADMIN — Medication 100 MILLIEQUIVALENT(S): at 15:14

## 2019-01-09 RX ADMIN — HEPARIN SODIUM 5000 UNIT(S): 5000 INJECTION INTRAVENOUS; SUBCUTANEOUS at 05:15

## 2019-01-09 RX ADMIN — Medication 5 MILLIGRAM(S): at 17:58

## 2019-01-09 RX ADMIN — CHLORHEXIDINE GLUCONATE 15 MILLILITER(S): 213 SOLUTION TOPICAL at 17:37

## 2019-01-09 RX ADMIN — SODIUM CHLORIDE 4 MILLILITER(S): 9 INJECTION INTRAMUSCULAR; INTRAVENOUS; SUBCUTANEOUS at 10:11

## 2019-01-09 RX ADMIN — SIMVASTATIN 20 MILLIGRAM(S): 20 TABLET, FILM COATED ORAL at 21:55

## 2019-01-09 RX ADMIN — AMLODIPINE BESYLATE 10 MILLIGRAM(S): 2.5 TABLET ORAL at 12:17

## 2019-01-09 RX ADMIN — Medication 40 MILLIGRAM(S): at 08:12

## 2019-01-09 RX ADMIN — Medication 40 MILLIEQUIVALENT(S): at 05:15

## 2019-01-09 RX ADMIN — Medication 2: at 15:03

## 2019-01-09 RX ADMIN — HEPARIN SODIUM 5000 UNIT(S): 5000 INJECTION INTRAVENOUS; SUBCUTANEOUS at 21:55

## 2019-01-09 RX ADMIN — Medication 2: at 01:00

## 2019-01-09 RX ADMIN — HUMAN INSULIN 10 UNIT(S): 100 INJECTION, SUSPENSION SUBCUTANEOUS at 17:39

## 2019-01-09 RX ADMIN — HUMAN INSULIN 10 UNIT(S): 100 INJECTION, SUSPENSION SUBCUTANEOUS at 05:24

## 2019-01-09 RX ADMIN — ALBUTEROL 1 PUFF(S): 90 AEROSOL, METERED ORAL at 15:21

## 2019-01-09 RX ADMIN — HEPARIN SODIUM 5000 UNIT(S): 5000 INJECTION INTRAVENOUS; SUBCUTANEOUS at 14:50

## 2019-01-09 RX ADMIN — ALBUTEROL 1 PUFF(S): 90 AEROSOL, METERED ORAL at 20:00

## 2019-01-09 RX ADMIN — SODIUM CHLORIDE 4 MILLILITER(S): 9 INJECTION INTRAMUSCULAR; INTRAVENOUS; SUBCUTANEOUS at 20:00

## 2019-01-09 RX ADMIN — ALBUTEROL 1 PUFF(S): 90 AEROSOL, METERED ORAL at 10:11

## 2019-01-09 RX ADMIN — DEXMEDETOMIDINE HYDROCHLORIDE IN 0.9% SODIUM CHLORIDE 8.41 MICROGRAM(S)/KG/HR: 4 INJECTION INTRAVENOUS at 10:27

## 2019-01-09 RX ADMIN — Medication 1000 MILLIGRAM(S): at 04:30

## 2019-01-09 RX ADMIN — Medication 2: at 05:25

## 2019-01-09 RX ADMIN — CHLORHEXIDINE GLUCONATE 15 MILLILITER(S): 213 SOLUTION TOPICAL at 05:15

## 2019-01-09 RX ADMIN — PIPERACILLIN AND TAZOBACTAM 25 GRAM(S): 4; .5 INJECTION, POWDER, LYOPHILIZED, FOR SOLUTION INTRAVENOUS at 01:00

## 2019-01-09 RX ADMIN — Medication 50 MILLIGRAM(S): at 17:39

## 2019-01-09 RX ADMIN — PROPOFOL 3.37 MICROGRAM(S)/KG/MIN: 10 INJECTION, EMULSION INTRAVENOUS at 07:24

## 2019-01-09 RX ADMIN — Medication 30 MILLIGRAM(S): at 05:15

## 2019-01-09 RX ADMIN — PIPERACILLIN AND TAZOBACTAM 25 GRAM(S): 4; .5 INJECTION, POWDER, LYOPHILIZED, FOR SOLUTION INTRAVENOUS at 17:37

## 2019-01-09 RX ADMIN — FENTANYL CITRATE 28.05 MICROGRAM(S)/KG/HR: 50 INJECTION INTRAVENOUS at 07:24

## 2019-01-09 RX ADMIN — Medication 6: at 10:32

## 2019-01-09 RX ADMIN — Medication 6: at 21:56

## 2019-01-09 RX ADMIN — ALBUTEROL 1 PUFF(S): 90 AEROSOL, METERED ORAL at 04:37

## 2019-01-09 RX ADMIN — PIPERACILLIN AND TAZOBACTAM 25 GRAM(S): 4; .5 INJECTION, POWDER, LYOPHILIZED, FOR SOLUTION INTRAVENOUS at 11:04

## 2019-01-09 RX ADMIN — BUDESONIDE AND FORMOTEROL FUMARATE DIHYDRATE 2 PUFF(S): 160; 4.5 AEROSOL RESPIRATORY (INHALATION) at 20:00

## 2019-01-09 RX ADMIN — Medication 1000 MILLIGRAM(S): at 09:45

## 2019-01-09 RX ADMIN — Medication 96 MICROGRAM(S): at 21:56

## 2019-01-09 RX ADMIN — FENTANYL CITRATE 5.61 MICROGRAM(S)/KG/HR: 50 INJECTION INTRAVENOUS at 11:03

## 2019-01-09 RX ADMIN — Medication 400 MILLIGRAM(S): at 09:11

## 2019-01-09 RX ADMIN — Medication 50 MILLIGRAM(S): at 05:15

## 2019-01-09 RX ADMIN — Medication 5 MILLIGRAM(S): at 08:26

## 2019-01-09 RX ADMIN — Medication 2: at 17:39

## 2019-01-09 RX ADMIN — DEXMEDETOMIDINE HYDROCHLORIDE IN 0.9% SODIUM CHLORIDE 8.41 MICROGRAM(S)/KG/HR: 4 INJECTION INTRAVENOUS at 14:50

## 2019-01-09 RX ADMIN — Medication 400 MILLIGRAM(S): at 04:00

## 2019-01-09 RX ADMIN — HUMAN INSULIN 10 UNIT(S): 100 INJECTION, SUSPENSION SUBCUTANEOUS at 12:28

## 2019-01-09 RX ADMIN — Medication 10 MILLILITER(S): at 11:05

## 2019-01-09 RX ADMIN — BUDESONIDE AND FORMOTEROL FUMARATE DIHYDRATE 2 PUFF(S): 160; 4.5 AEROSOL RESPIRATORY (INHALATION) at 10:11

## 2019-01-09 NOTE — PROGRESS NOTE ADULT - ATTENDING COMMENTS
Critical Care Dx    Acute Respiratory failure with chronic underlying COPD, hypoxic/hypercapneic   -P/F ratio started <100, improving today  -Flu +, on Tamiflu, bilateral infiltrates, requiring elevated PEEP  -CPAP, goal to lower Fi02 < 60% now on 40%    Hyperglycemia  -Basal/bolus insulin regiment with corrective scale, frequent fingersticks. Endocrine consult prefers NPH due to steroid timing.   -con't tube feeds    Multifocal pneumonia, +Flu  -Zosyn and tamiflu ordered  -trend CXR, ABG's   -Bronch today    Hypothyroidism  -Con't IV conversion of synthroid     RA, COPD  -stress dose steroids given baseline requirement of prednisone and now with surgical/inflammatory stress.  -wean off stress dose today and continue to lower slowly    At risk for malnutrition  -reached goal tube feedings but has been underfed in post-surgical time period  -monitor prealbumin weekly     The patient is a critical care patient with life threatening respiratory instability in SICU.  I have personally interviewed and examined the patient, reviewed data and laboratory tests/x-rays and all pertinent electronic images.  The SICU team has a constant risk benefit analyzes discussion with the primary team, all consultants, House Staff and PA's on all decisions.  These diagnoses are unrelated to the surgical procedure noted.  Time involved in performance of separately billable procedures was not counted toward my critical care time. There is no overlap.    I spent 50 minutes in total providing critical care for the diagnoses, assessment and plan above.      I was physically present for the key portions of the evaluation and management (E/M) service provided.  I agree with the above history, physical, and plan which I have reviewed and edited where appropriate.     Ryan Bear MD  Acute and Critical Care Surgery .

## 2019-01-09 NOTE — PROGRESS NOTE ADULT - SUBJECTIVE AND OBJECTIVE BOX
B Team Surgery Progress Note    SUBJECTIVE: Patient seen and examined at the bedside. No acute events overnight. Sedated and intubated with FiO2 of 50% and PEEP of 10.      VITALS  T(C): 38.3 (01-09-19 @ 04:00), Max: 38.3 (01-08-19 @ 20:00)  HR: 117 (01-09-19 @ 07:00) (56 - 133)  BP: 116/66 (01-08-19 @ 08:14) (116/66 - 116/66)  RR: 22 (01-09-19 @ 07:00) (20 - 23)  SpO2: 98% (01-09-19 @ 07:00) (90% - 100%)  CAPILLARY BLOOD GLUCOSE    POCT Blood Glucose.: 177 mg/dL (09 Jan 2019 05:23)  POCT Blood Glucose.: 155 mg/dL (09 Jan 2019 00:58)  POCT Blood Glucose.: 265 mg/dL (08 Jan 2019 21:51)  POCT Blood Glucose.: 283 mg/dL (08 Jan 2019 17:48)  POCT Blood Glucose.: 287 mg/dL (08 Jan 2019 13:51)  POCT Blood Glucose.: 282 mg/dL (08 Jan 2019 10:30)  POCT Blood Glucose.: 239 mg/dL (08 Jan 2019 08:46)    Is/Os    01-08 @ 07:01  -  01-09 @ 07:00  --------------------------------------------------------  IN:    fentaNYL  Infusion: 673.5 mL    Glucerna: 1111 mL    IV PiggyBack: 100 mL    propofol Infusion: 373.8 mL  Total IN: 2258.3 mL    OUT:    Indwelling Catheter - Urethral: 1640 mL  Total OUT: 1640 mL    Total NET: 618.3 mL      PHYSICAL EXAM:   General: Sedated - propofol and fentanyl  Pulm: Intubated SpO2 93%  CV: HR 88 regular rhythm BP 98/53  GI/Abd: Softly distended, midline incision w/ retention sutures, skin edges appear more  than yesterday, wound appears more soupy than yesterday with fibrinous exudate, aquacel dressing placed over retention sutures followed by ABD and abdominal binder in place  : iraheta draining clear urine    MEDICATIONS (STANDING): ALBUTerol    90 MICROgram(s) HFA Inhaler 1 Puff(s) Inhalation every 6 hours  amLODIPine   Tablet 5 milliGRAM(s) Oral daily  buDESOnide 160 MICROgram(s)/formoterol 4.5 MICROgram(s) Inhaler 2 Puff(s) Inhalation two times a day  dextrose 50% Injectable 25 milliLiter(s) IV Push once  fentaNYL   Infusion 2.5 MICROgram(s)/kG/Hr IV Continuous <Continuous>  furosemide   Injectable 40 milliGRAM(s) IV Push two times a day  heparin  Injectable 5000 Unit(s) SubCutaneous every 8 hours  hydrocortisone sodium succinate Injectable 50 milliGRAM(s) IV Push every 8 hours  influenza   Vaccine 0.5 milliLiter(s) IntraMuscular once  insulin lispro (HumaLOG) corrective regimen sliding scale   SubCutaneous every 4 hours  insulin NPH human recombinant 10 Unit(s) SubCutaneous every 6 hours  levothyroxine Injectable 96 MICROGram(s) IV Push at bedtime  metoprolol tartrate Injectable 5 milliGRAM(s) IV Push every 6 hours  pantoprazole  Injectable 40 milliGRAM(s) IV Push daily  piperacillin/tazobactam IVPB. 3.375 Gram(s) IV Intermittent every 8 hours  propofol Infusion 5 MICROgram(s)/kG/Min IV Continuous <Continuous>  simvastatin 20 milliGRAM(s) Oral at bedtime  sodium chloride 3%  Inhalation 4 milliLiter(s) Inhalation every 6 hours    MEDICATIONS (PRN):  LABS  CBC (01-09 @ 06:10)                              7.5<L>                         10.70<H>  )----------------(  461<H>     --    % Neutrophils, --    % Lymphocytes, ANC: --                                  23.5<L>  CBC (01-09 @ 02:30)                              6.8<LL>                         10.56<H>  )----------------(  498<H>     --    % Neutrophils, --    % Lymphocytes, ANC: --                                  21.4<L>    BMP (01-09 @ 02:30)             143     |  99      |  21    		Ca++ --      Ca 9.1                ---------------------------------( 156<H>		Mg 2.1                3.2<L>  |  29      |  1.04  			Ph 2.6     BMP (01-08 @ 00:00)             139     |  97<L>   |  18    		Ca++ --      Ca 9.2                ---------------------------------( 241<H>		Mg 1.8                4.3     |  29      |  0.91  			Ph 2.8             ABG (01-09 @ 02:30)     7.47<H> / 44 / 71<L> / 31<H> / 7.7 / 93.8<L>%     Lactate: 2.3<H>   ABG (01-08 @ 17:38)     7.45 / 48 / 128<H> / 32<H> / 8.2 / 97.8%     Lactate:

## 2019-01-09 NOTE — PROGRESS NOTE ADULT - ASSESSMENT
ASSESSMENT:  63y Female hx of COPD presented initially with SBO, underwent surgery closed with 4 retention sutures, evicerated on 12/30 and closed with 6 more retention sutures, now c/b multifocal pneumonia, flu and respiratory distress /hypoxia on continuous bipap. Patient temporarily transferred to RCU, but developed hypercapnia and respiratory distress requiring intubation 1/6 and subsequent transfer to SICU.     #neuro  Awake  - Sedation with fentanyl and precedex held this am for SBT  - Propofol started 1/8  - Attempt sedation holiday in AM    #resp  - Intubated on AC. Attempting SBT this morning  - Will check ABG and trend PaO2/FiO2 ratio  - Hx COPD and pulm HTN, current smoker  - C/w duoneb, pulmonary toilet    #cv  Total body fluid overload  - Off pressors  - Vitals stable  - Restarted Nadolol   - Monitor A-line site, difficult Arterial access low axillary/brachial    #GI  - s/p ex-lap  for evisceration, retention sutures placed  - c/w protonix daily  - c/w TF    #renal  - hx CKD  - BUN/Crt wnl  - C/w iraheta  - monitor I's&O's  - repletions PRN    #heme  - sqh for dvt ppx    #ID  - trend fevers  - c/w zosyn and tamiflu    #endo  - hx RA on chronic prednisone, DM  - continue hydrocortisone   - Lantus 10u increased to 15u, ISS    #dispo  -SICU      Critical Care Dx:  - Respiratory failure  - CHRIS  - Morbid obesity  - Evisceration  - Severe protein/calorie malnutrition  - Pulmonary HTN  - COPD  - Adrenal insufficiency

## 2019-01-09 NOTE — PROGRESS NOTE ADULT - ASSESSMENT
63F w/ multiple respiratory comorbidities p/w SBO s/p ex-lap with findings of healthy bowel c/b evisceration of bowel s/p ex-lap with placement of retention sutures, now with worsening separation at skin edges requiring SICU level care including intubation with FiO2 of 50% and PEEP of 10    Plan  - Pain control  - NPO w/ tube feeds  - C/w Zosyn abx for multifocal pna  - Monitor GI fxn   - Monitor respiratory status  - Follow long term care plan - will likely need tracheostomy  - DVT ppx: subQ heparin  - Follow care plan per SICU team

## 2019-01-09 NOTE — PROGRESS NOTE ADULT - ATTENDING COMMENTS
Seen and examined, chart and note reviewed, case discussed with SICU team    Respiratory failure  a.  Decrease PEEP to 10.  Would wean to minimal PEEP/FIO2 requirements prior to SBT  b.  Check ABG     Hypotension  a.  Wean propofol gt  b.  Attach flotrac monitor    Agitation  a.  May give ativan/haldol as needed    Spent 35 minutes in critical care

## 2019-01-09 NOTE — PROGRESS NOTE ADULT - SUBJECTIVE AND OBJECTIVE BOX
HISTORY  63y Female hx of COPD presented initially with SBO, underwent surgery closed with 4 retention sutures, evicerated on 12/30 and closed with 6 more retention sutures, now c/b multifocal pneumonia, flu and respiratory distress /hypoxia on continuous bipap. Patient temporarily transferred to RCU, but developed hypercapnia and respiratory distress requiring intubation 1/6 and subsequent transfer to SICU.     24 HOUR EVENTS: Patient hypertensive when awake. Changed PEEP from 12 to 10 which helped with hypotension. Hypotensive to 90/40s. Titrated propofol / fentanyl for sedation.     SUBJECTIVE/ROS:  [x] A ten-point review of systems was otherwise negative except as noted.  [ ] Due to altered mental status/intubation, subjective information were not able to be obtained from the patient. History was obtained, to the extent possible, from review of the chart and collateral sources of information.      NEURO  RASS: making eye contact and following commands when propofol weaned down.   Meds: fentaNYL   Infusion 2.5 MICROgram(s)/kG/Hr IV Continuous <Continuous>  propofol Infusion 5 MICROgram(s)/kG/Min IV Continuous <Continuous>  [x] Adequacy of sedation and pain control has been assessed and adjusted    RESPIRATORY  RR: 22 (01-09-19 @ 00:00) (20 - 23)  SpO2: 97% (01-09-19 @ 00:00) (90% - 100%)  Wt(kg): --  Exam: unlabored, clear to auscultation bilaterally  Mechanical Ventilation: Mode: AC/ CMV (Assist Control/ Continuous Mandatory Ventilation), RR (machine): 22, RR (patient): 22, TV (machine): 400, FiO2: 60, PEEP: 10, MAP: 16, PIP: 28  ABG - ( 08 Jan 2019 17:38 )  pH: 7.45  /  pCO2: 48    /  pO2: 128   / HCO3: 32    / Base Excess: 8.2   /  SaO2: 97.8    Lactate: x      [x] Extubation Readiness Assessed  Meds: ALBUTerol    90 MICROgram(s) HFA Inhaler 1 Puff(s) Inhalation every 6 hours  buDESOnide 160 MICROgram(s)/formoterol 4.5 MICROgram(s) Inhaler 2 Puff(s) Inhalation two times a day  sodium chloride 3%  Inhalation 4 milliLiter(s) Inhalation every 6 hours    CARDIOVASCULAR  HR: 80 (01-08-19 @ 23:20) (56 - 133)  BP: 116/66 (01-08-19 @ 08:14) (116/66 - 153/92)  BP(mean): 78 (01-08-19 @ 08:14) (78 - 108)  ABP: 123/56 (01-08-19 @ 23:20) (91/50 - 184/97)  ABP(mean): 79 (01-08-19 @ 23:20) (66 - 131)  Exam: RRR  Perfusion     [x]Adequate   [ ]Inadequate  Mentation   [x]Normal       [ ]Reduced  Meds: amLODIPine   Tablet 5 milliGRAM(s) Oral daily  furosemide   Injectable 40 milliGRAM(s) IV Push two times a day  metoprolol tartrate Injectable 5 milliGRAM(s) IV Push every 6 hours    GI/NUTRITION  Exam: Retention sutures intact, abd S/NT/ND  Diet: TF@goal  Meds: pantoprazole  Injectable 40 milliGRAM(s) IV Push daily    GENITOURINARY  I&O's Detail    01-07 @ 07:01 - 01-08 @ 07:00  --------------------------------------------------------  IN:    dexmedetomidine Infusion: 235.2 mL    fentaNYL  Infusion: 380.8 mL    fentaNYL  Infusion: 196.7 mL    insulin Infusion: 6 mL    insulin Infusion: 8 mL    insulin Infusion: 4 mL    insulin Infusion: 2 mL    IV PiggyBack: 350 mL    propofol Infusion: 74.2 mL  Total IN: 1256.9 mL    OUT:    Indwelling Catheter - Urethral: 2800 mL  Total OUT: 2800 mL  Total NET: -1543.1 mL    01-08 @ 07:01 - 01-09 @ 00:23  --------------------------------------------------------  IN:    fentaNYL  Infusion: 572.7 mL    Glucerna: 906 mL    IV PiggyBack: 100 mL    propofol Infusion: 259.2 mL  Total IN: 1837.9 mL    OUT:    Indwelling Catheter - Urethral: 1300 mL  Total OUT: 1300 mL    Total NET: 537.9 mL    01-08    139  |  97<L>  |  18  ----------------------------<  241<H>  4.3   |  29  |  0.91    Ca    9.2      08 Jan 2019 00:00  Phos  2.8     01-08  Mg     1.8     01-08    TPro  6.4  /  Alb  2.8<L>  /  TBili  0.2  /  DBili  x   /  AST  57<H>  /  ALT  40<H>  /  AlkPhos  177<H>  01-07    [ ] Antoine catheter, indication: N/A  Meds:   HEMATOLOGIC  Meds: heparin  Injectable 5000 Unit(s) SubCutaneous every 8 hours    [x] VTE Prophylaxis                        7.6    9.47  )-----------( 497      ( 08 Jan 2019 00:00 )             24.3     Transfusion     [ ] PRBC   [ ] Platelets   [ ] FFP   [ ] Cryoprecipitate  INFECTIOUS DISEASES  T(C): 38.1 (01-09-19 @ 00:00), Max: 38.3 (01-08-19 @ 20:00)  Wt(kg): --    Recent Cultures:  Specimen Source: SPUTUM, 01-02 @ 13:11; Results --; Gram Stain: --; Organism: --    Meds: influenza   Vaccine 0.5 milliLiter(s) IntraMuscular once  oseltamivir 30 milliGRAM(s) Oral two times a day  piperacillin/tazobactam IVPB. 3.375 Gram(s) IV Intermittent every 8 hours    ENDOCRINE  Capillary Blood Glucose  Meds: dextrose 50% Injectable 25 milliLiter(s) IV Push once  hydrocortisone sodium succinate Injectable 50 milliGRAM(s) IV Push every 8 hours  insulin lispro (HumaLOG) corrective regimen sliding scale   SubCutaneous every 4 hours  insulin NPH human recombinant 10 Unit(s) SubCutaneous every 6 hours  levothyroxine Injectable 96 MICROGram(s) IV Push at bedtime  simvastatin 20 milliGRAM(s) Oral at bedtime    ACCESS DEVICES:  [x] Peripheral IV  [x] Central Venous Line	[ ] R	[ ] L	[ ] IJ	[ ] Fem	[ ] SC	Placed:   [ ] Arterial Line		[ ] R	[ ] L	[ ] Fem	[ ] Rad	[ ] Ax	Placed:   [ ] PICC:					[ ] Mediport  [ ] Urinary Catheter, Date Placed:   [ ] Necessity of urinary, arterial, and venous catheters discussed    OTHER MEDICATIONS:  chlorhexidine 0.12% Liquid 15 milliLiter(s) Swish and Spit two times a day  chlorhexidine 4% Liquid 1 Application(s) Topical <User Schedule>

## 2019-01-09 NOTE — PROGRESS NOTE ADULT - ASSESSMENT
ASSESSMENT:  63y Female hx of COPD presented initially with SBO, underwent surgery closed with 4 retention sutures, evicerated on 12/30 and closed with 6 more retention sutures, now c/b multifocal pneumonia, flu and respiratory distress /hypoxia on continuous bipap. Patient temporarily transferred to RCU, but developed hypercapnia and respiratory distress requiring intubation 1/6 and subsequent transfer to SICU.     #neuro  Awake  - Sedation with fentanyl and precedex held this am for SBT  - Attempt sedation holiday in AM    #resp  - Intubated on AC. Attempting SBT this morning  - Will check ABG and trend PaO2/FiO2 ratio  - Hx COPD and pulm HTN, current smoker  - C/w duoneb, pulmonary toilet    #cv  Total body fluid overload  - Off pressors  - Vitals stable  - amlodipine increased to 10mg  - Monitor A-line site, difficult Arterial access low axillary/brachial    #GI  - s/p ex-lap  for evisceration, retention sutures placed  - c/w protonix daily  - c/w TF    #renal  - hx CKD  - BUN/Crt wnl  - C/w iraheta  - monitor I's&O's  - repletions PRN    #heme  - sqh for dvt ppx    #ID  - trend fevers  - c/w zosyn and tamiflu    #endo  - hx RA on chronic prednisone, DM  - continue hydrocortisone, wean to q12   - Lantus 10u increased to 15u, ISS    #dispo  -SICU      Critical Care Dx:  - Respiratory failure  - CHRIS  - Morbid obesity  - Evisceration  - Severe protein/calorie malnutrition  - Pulmonary HTN  - COPD  - Adrenal insufficiency

## 2019-01-10 LAB
ANION GAP SERPL CALC-SCNC: 13 MEQ/L — SIGNIFICANT CHANGE UP (ref 7–14)
ANION GAP SERPL CALC-SCNC: 16 MEQ/L — HIGH (ref 7–14)
ANISOCYTOSIS BLD QL: SLIGHT — SIGNIFICANT CHANGE UP
BASE EXCESS BLDA CALC-SCNC: 10.6 MMOL/L — SIGNIFICANT CHANGE UP
BASE EXCESS BLDA CALC-SCNC: 9.3 MMOL/L — SIGNIFICANT CHANGE UP
BASE EXCESS BLDA CALC-SCNC: 9.6 MMOL/L — SIGNIFICANT CHANGE UP
BASOPHILS # BLD AUTO: 0.07 K/UL — SIGNIFICANT CHANGE UP (ref 0–0.2)
BASOPHILS NFR BLD AUTO: 0.5 % — SIGNIFICANT CHANGE UP (ref 0–2)
BASOPHILS NFR SPEC: 0 % — SIGNIFICANT CHANGE UP (ref 0–2)
BLASTS # FLD: 0 % — SIGNIFICANT CHANGE UP (ref 0–0)
BUN SERPL-MCNC: 21 MG/DL — SIGNIFICANT CHANGE UP (ref 7–23)
BUN SERPL-MCNC: 22 MG/DL — SIGNIFICANT CHANGE UP (ref 7–23)
CA-I BLDA-SCNC: 1.14 MMOL/L — LOW (ref 1.15–1.29)
CA-I BLDA-SCNC: 1.2 MMOL/L — SIGNIFICANT CHANGE UP (ref 1.15–1.29)
CA-I BLDA-SCNC: 1.2 MMOL/L — SIGNIFICANT CHANGE UP (ref 1.15–1.29)
CALCIUM SERPL-MCNC: 9.4 MG/DL — SIGNIFICANT CHANGE UP (ref 8.4–10.5)
CALCIUM SERPL-MCNC: 9.4 MG/DL — SIGNIFICANT CHANGE UP (ref 8.4–10.5)
CHLORIDE SERPL-SCNC: 101 MMOL/L — SIGNIFICANT CHANGE UP (ref 98–107)
CHLORIDE SERPL-SCNC: 98 MMOL/L — SIGNIFICANT CHANGE UP (ref 98–107)
CO2 SERPL-SCNC: 32 MMOL/L — HIGH (ref 22–31)
CO2 SERPL-SCNC: 33 MMOL/L — HIGH (ref 22–31)
CREAT SERPL-MCNC: 0.89 MG/DL — SIGNIFICANT CHANGE UP (ref 0.5–1.3)
CREAT SERPL-MCNC: 0.92 MG/DL — SIGNIFICANT CHANGE UP (ref 0.5–1.3)
EOSINOPHIL # BLD AUTO: 0.05 K/UL — SIGNIFICANT CHANGE UP (ref 0–0.5)
EOSINOPHIL NFR BLD AUTO: 0.4 % — SIGNIFICANT CHANGE UP (ref 0–6)
EOSINOPHIL NFR FLD: 0.9 % — SIGNIFICANT CHANGE UP (ref 0–6)
GIANT PLATELETS BLD QL SMEAR: PRESENT — SIGNIFICANT CHANGE UP
GLUCOSE BLDA-MCNC: 134 MG/DL — HIGH (ref 70–99)
GLUCOSE BLDA-MCNC: 205 MG/DL — HIGH (ref 70–99)
GLUCOSE BLDA-MCNC: 217 MG/DL — HIGH (ref 70–99)
GLUCOSE BLDC GLUCOMTR-MCNC: 109 MG/DL — HIGH (ref 70–99)
GLUCOSE BLDC GLUCOMTR-MCNC: 122 MG/DL — HIGH (ref 70–99)
GLUCOSE BLDC GLUCOMTR-MCNC: 150 MG/DL — HIGH (ref 70–99)
GLUCOSE BLDC GLUCOMTR-MCNC: 193 MG/DL — HIGH (ref 70–99)
GLUCOSE BLDC GLUCOMTR-MCNC: 207 MG/DL — HIGH (ref 70–99)
GLUCOSE BLDC GLUCOMTR-MCNC: 217 MG/DL — HIGH (ref 70–99)
GLUCOSE SERPL-MCNC: 133 MG/DL — HIGH (ref 70–99)
GLUCOSE SERPL-MCNC: 228 MG/DL — HIGH (ref 70–99)
HCO3 BLDA-SCNC: 33 MMOL/L — HIGH (ref 22–26)
HCO3 BLDA-SCNC: 33 MMOL/L — HIGH (ref 22–26)
HCO3 BLDA-SCNC: 34 MMOL/L — HIGH (ref 22–26)
HCT VFR BLD CALC: 27.9 % — LOW (ref 34.5–45)
HCT VFR BLDA CALC: 28.4 % — LOW (ref 34.5–46.5)
HCT VFR BLDA CALC: 28.7 % — LOW (ref 34.5–46.5)
HCT VFR BLDA CALC: 29.1 % — LOW (ref 34.5–46.5)
HGB BLD-MCNC: 8.9 G/DL — LOW (ref 11.5–15.5)
HGB BLDA-MCNC: 9.1 G/DL — LOW (ref 11.5–15.5)
HGB BLDA-MCNC: 9.3 G/DL — LOW (ref 11.5–15.5)
HGB BLDA-MCNC: 9.4 G/DL — LOW (ref 11.5–15.5)
HYPOCHROMIA BLD QL: SLIGHT — SIGNIFICANT CHANGE UP
IMM GRANULOCYTES NFR BLD AUTO: 6.5 % — HIGH (ref 0–1.5)
LACTATE BLDA-SCNC: 1.2 MMOL/L — SIGNIFICANT CHANGE UP (ref 0.5–2)
LACTATE BLDA-SCNC: 1.4 MMOL/L — SIGNIFICANT CHANGE UP (ref 0.5–2)
LACTATE BLDA-SCNC: 2.6 MMOL/L — HIGH (ref 0.5–2)
LYMPHOCYTES # BLD AUTO: 1.31 K/UL — SIGNIFICANT CHANGE UP (ref 1–3.3)
LYMPHOCYTES # BLD AUTO: 9.5 % — LOW (ref 13–44)
LYMPHOCYTES NFR SPEC AUTO: 4.4 % — LOW (ref 13–44)
MACROCYTES BLD QL: SLIGHT — SIGNIFICANT CHANGE UP
MAGNESIUM SERPL-MCNC: 1.8 MG/DL — SIGNIFICANT CHANGE UP (ref 1.6–2.6)
MAGNESIUM SERPL-MCNC: 2.5 MG/DL — SIGNIFICANT CHANGE UP (ref 1.6–2.6)
MCHC RBC-ENTMCNC: 30.1 PG — SIGNIFICANT CHANGE UP (ref 27–34)
MCHC RBC-ENTMCNC: 31.9 % — LOW (ref 32–36)
MCV RBC AUTO: 94.3 FL — SIGNIFICANT CHANGE UP (ref 80–100)
METAMYELOCYTES # FLD: 0 % — SIGNIFICANT CHANGE UP (ref 0–1)
MONOCYTES # BLD AUTO: 1 K/UL — HIGH (ref 0–0.9)
MONOCYTES NFR BLD AUTO: 7.2 % — SIGNIFICANT CHANGE UP (ref 2–14)
MONOCYTES NFR BLD: 6.1 % — SIGNIFICANT CHANGE UP (ref 2–9)
MYELOCYTES NFR BLD: 0.9 % — HIGH (ref 0–0)
NEUTROPHIL AB SER-ACNC: 79.8 % — HIGH (ref 43–77)
NEUTROPHILS # BLD AUTO: 10.5 K/UL — HIGH (ref 1.8–7.4)
NEUTROPHILS NFR BLD AUTO: 75.9 % — SIGNIFICANT CHANGE UP (ref 43–77)
NEUTS BAND # BLD: 1.7 % — SIGNIFICANT CHANGE UP (ref 0–6)
NRBC # BLD: 4.4 /100WBC — SIGNIFICANT CHANGE UP
NRBC # FLD: 0.3 K/UL — LOW (ref 25–125)
NRBC FLD-RTO: 2.2 — SIGNIFICANT CHANGE UP
OTHER - HEMATOLOGY %: 0 — SIGNIFICANT CHANGE UP
OVALOCYTES BLD QL SMEAR: SLIGHT — SIGNIFICANT CHANGE UP
PCO2 BLDA: 45 MMHG — SIGNIFICANT CHANGE UP (ref 32–48)
PCO2 BLDA: 48 MMHG — SIGNIFICANT CHANGE UP (ref 32–48)
PCO2 BLDA: 49 MMHG — HIGH (ref 32–48)
PH BLDA: 7.46 PH — HIGH (ref 7.35–7.45)
PH BLDA: 7.47 PH — HIGH (ref 7.35–7.45)
PH BLDA: 7.49 PH — HIGH (ref 7.35–7.45)
PHOSPHATE SERPL-MCNC: 2.6 MG/DL — SIGNIFICANT CHANGE UP (ref 2.5–4.5)
PHOSPHATE SERPL-MCNC: 3 MG/DL — SIGNIFICANT CHANGE UP (ref 2.5–4.5)
PLATELET # BLD AUTO: 571 K/UL — HIGH (ref 150–400)
PLATELET COUNT - ESTIMATE: SIGNIFICANT CHANGE UP
PMV BLD: 9.5 FL — SIGNIFICANT CHANGE UP (ref 7–13)
PO2 BLDA: 58 MMHG — LOW (ref 83–108)
PO2 BLDA: 61 MMHG — LOW (ref 83–108)
PO2 BLDA: 74 MMHG — LOW (ref 83–108)
POIKILOCYTOSIS BLD QL AUTO: SLIGHT — SIGNIFICANT CHANGE UP
POLYCHROMASIA BLD QL SMEAR: SLIGHT — SIGNIFICANT CHANGE UP
POTASSIUM BLDA-SCNC: 2.8 MMOL/L — CRITICAL LOW (ref 3.4–4.5)
POTASSIUM BLDA-SCNC: 3.9 MMOL/L — SIGNIFICANT CHANGE UP (ref 3.4–4.5)
POTASSIUM BLDA-SCNC: 4 MMOL/L — SIGNIFICANT CHANGE UP (ref 3.4–4.5)
POTASSIUM SERPL-MCNC: 3.1 MMOL/L — LOW (ref 3.5–5.3)
POTASSIUM SERPL-MCNC: 4.3 MMOL/L — SIGNIFICANT CHANGE UP (ref 3.5–5.3)
POTASSIUM SERPL-SCNC: 3.1 MMOL/L — LOW (ref 3.5–5.3)
POTASSIUM SERPL-SCNC: 4.3 MMOL/L — SIGNIFICANT CHANGE UP (ref 3.5–5.3)
PROMYELOCYTES # FLD: 0 % — SIGNIFICANT CHANGE UP (ref 0–0)
RBC # BLD: 2.96 M/UL — LOW (ref 3.8–5.2)
RBC # FLD: 17.1 % — HIGH (ref 10.3–14.5)
SAO2 % BLDA: 86.4 % — LOW (ref 95–99)
SAO2 % BLDA: 90.7 % — LOW (ref 95–99)
SAO2 % BLDA: 94.4 % — LOW (ref 95–99)
SMUDGE CELLS # BLD: PRESENT — SIGNIFICANT CHANGE UP
SODIUM BLDA-SCNC: 141 MMOL/L — SIGNIFICANT CHANGE UP (ref 136–146)
SODIUM BLDA-SCNC: 143 MMOL/L — SIGNIFICANT CHANGE UP (ref 136–146)
SODIUM BLDA-SCNC: 143 MMOL/L — SIGNIFICANT CHANGE UP (ref 136–146)
SODIUM SERPL-SCNC: 146 MMOL/L — HIGH (ref 135–145)
SODIUM SERPL-SCNC: 147 MMOL/L — HIGH (ref 135–145)
TARGETS BLD QL SMEAR: SLIGHT — SIGNIFICANT CHANGE UP
VARIANT LYMPHS # BLD: 5.3 % — SIGNIFICANT CHANGE UP
WBC # BLD: 13.83 K/UL — HIGH (ref 3.8–10.5)
WBC # FLD AUTO: 13.83 K/UL — HIGH (ref 3.8–10.5)

## 2019-01-10 PROCEDURE — 99291 CRITICAL CARE FIRST HOUR: CPT

## 2019-01-10 PROCEDURE — 71045 X-RAY EXAM CHEST 1 VIEW: CPT | Mod: 26

## 2019-01-10 PROCEDURE — 99233 SBSQ HOSP IP/OBS HIGH 50: CPT | Mod: GC

## 2019-01-10 RX ORDER — PANTOPRAZOLE SODIUM 20 MG/1
40 TABLET, DELAYED RELEASE ORAL DAILY
Qty: 0 | Refills: 0 | Status: DISCONTINUED | OUTPATIENT
Start: 2019-01-10 | End: 2019-01-29

## 2019-01-10 RX ORDER — METOPROLOL TARTRATE 50 MG
25 TABLET ORAL
Qty: 0 | Refills: 0 | Status: DISCONTINUED | OUTPATIENT
Start: 2019-01-10 | End: 2019-02-01

## 2019-01-10 RX ORDER — HYDRALAZINE HCL 50 MG
10 TABLET ORAL ONCE
Qty: 0 | Refills: 0 | Status: COMPLETED | OUTPATIENT
Start: 2019-01-10 | End: 2019-01-10

## 2019-01-10 RX ORDER — LEVOTHYROXINE SODIUM 125 MCG
125 TABLET ORAL AT BEDTIME
Qty: 0 | Refills: 0 | Status: DISCONTINUED | OUTPATIENT
Start: 2019-01-10 | End: 2019-01-11

## 2019-01-10 RX ORDER — POTASSIUM CHLORIDE 20 MEQ
40 PACKET (EA) ORAL ONCE
Qty: 0 | Refills: 0 | Status: COMPLETED | OUTPATIENT
Start: 2019-01-10 | End: 2019-01-10

## 2019-01-10 RX ORDER — MAGNESIUM SULFATE 500 MG/ML
2 VIAL (ML) INJECTION ONCE
Qty: 0 | Refills: 0 | Status: COMPLETED | OUTPATIENT
Start: 2019-01-10 | End: 2019-01-10

## 2019-01-10 RX ORDER — INSULIN LISPRO 100/ML
VIAL (ML) SUBCUTANEOUS EVERY 6 HOURS
Qty: 0 | Refills: 0 | Status: DISCONTINUED | OUTPATIENT
Start: 2019-01-10 | End: 2019-01-14

## 2019-01-10 RX ORDER — HUMAN INSULIN 100 [IU]/ML
14 INJECTION, SUSPENSION SUBCUTANEOUS EVERY 6 HOURS
Qty: 0 | Refills: 0 | Status: DISCONTINUED | OUTPATIENT
Start: 2019-01-10 | End: 2019-01-15

## 2019-01-10 RX ORDER — ACETAMINOPHEN 500 MG
1000 TABLET ORAL EVERY 8 HOURS
Qty: 0 | Refills: 0 | Status: COMPLETED | OUTPATIENT
Start: 2019-01-10 | End: 2019-01-10

## 2019-01-10 RX ORDER — POTASSIUM CHLORIDE 20 MEQ
20 PACKET (EA) ORAL ONCE
Qty: 0 | Refills: 0 | Status: COMPLETED | OUTPATIENT
Start: 2019-01-10 | End: 2019-01-10

## 2019-01-10 RX ADMIN — FENTANYL CITRATE 5.61 MICROGRAM(S)/KG/HR: 50 INJECTION INTRAVENOUS at 19:34

## 2019-01-10 RX ADMIN — PIPERACILLIN AND TAZOBACTAM 25 GRAM(S): 4; .5 INJECTION, POWDER, LYOPHILIZED, FOR SOLUTION INTRAVENOUS at 01:40

## 2019-01-10 RX ADMIN — HEPARIN SODIUM 5000 UNIT(S): 5000 INJECTION INTRAVENOUS; SUBCUTANEOUS at 05:49

## 2019-01-10 RX ADMIN — ALBUTEROL 1 PUFF(S): 90 AEROSOL, METERED ORAL at 15:36

## 2019-01-10 RX ADMIN — SODIUM CHLORIDE 4 MILLILITER(S): 9 INJECTION INTRAMUSCULAR; INTRAVENOUS; SUBCUTANEOUS at 15:42

## 2019-01-10 RX ADMIN — FENTANYL CITRATE 5.61 MICROGRAM(S)/KG/HR: 50 INJECTION INTRAVENOUS at 10:03

## 2019-01-10 RX ADMIN — HUMAN INSULIN 10 UNIT(S): 100 INJECTION, SUSPENSION SUBCUTANEOUS at 05:01

## 2019-01-10 RX ADMIN — Medication 4: at 02:48

## 2019-01-10 RX ADMIN — SODIUM CHLORIDE 4 MILLILITER(S): 9 INJECTION INTRAMUSCULAR; INTRAVENOUS; SUBCUTANEOUS at 20:17

## 2019-01-10 RX ADMIN — ALBUTEROL 1 PUFF(S): 90 AEROSOL, METERED ORAL at 10:55

## 2019-01-10 RX ADMIN — HUMAN INSULIN 14 UNIT(S): 100 INJECTION, SUSPENSION SUBCUTANEOUS at 11:36

## 2019-01-10 RX ADMIN — Medication 100 MILLIEQUIVALENT(S): at 03:58

## 2019-01-10 RX ADMIN — HUMAN INSULIN 14 UNIT(S): 100 INJECTION, SUSPENSION SUBCUTANEOUS at 23:56

## 2019-01-10 RX ADMIN — DEXMEDETOMIDINE HYDROCHLORIDE IN 0.9% SODIUM CHLORIDE 8.41 MICROGRAM(S)/KG/HR: 4 INJECTION INTRAVENOUS at 19:34

## 2019-01-10 RX ADMIN — BUDESONIDE AND FORMOTEROL FUMARATE DIHYDRATE 2 PUFF(S): 160; 4.5 AEROSOL RESPIRATORY (INHALATION) at 20:16

## 2019-01-10 RX ADMIN — ALBUTEROL 1 PUFF(S): 90 AEROSOL, METERED ORAL at 03:52

## 2019-01-10 RX ADMIN — BUDESONIDE AND FORMOTEROL FUMARATE DIHYDRATE 2 PUFF(S): 160; 4.5 AEROSOL RESPIRATORY (INHALATION) at 10:56

## 2019-01-10 RX ADMIN — Medication 40 MILLIGRAM(S): at 00:01

## 2019-01-10 RX ADMIN — Medication 5 MILLIGRAM(S): at 11:09

## 2019-01-10 RX ADMIN — HEPARIN SODIUM 5000 UNIT(S): 5000 INJECTION INTRAVENOUS; SUBCUTANEOUS at 13:14

## 2019-01-10 RX ADMIN — DEXMEDETOMIDINE HYDROCHLORIDE IN 0.9% SODIUM CHLORIDE 8.41 MICROGRAM(S)/KG/HR: 4 INJECTION INTRAVENOUS at 10:03

## 2019-01-10 RX ADMIN — Medication 25 MILLIGRAM(S): at 21:16

## 2019-01-10 RX ADMIN — Medication 50 GRAM(S): at 03:58

## 2019-01-10 RX ADMIN — Medication 40 MILLIEQUIVALENT(S): at 03:58

## 2019-01-10 RX ADMIN — Medication 2: at 11:35

## 2019-01-10 RX ADMIN — SIMVASTATIN 20 MILLIGRAM(S): 20 TABLET, FILM COATED ORAL at 21:16

## 2019-01-10 RX ADMIN — CHLORHEXIDINE GLUCONATE 15 MILLILITER(S): 213 SOLUTION TOPICAL at 20:43

## 2019-01-10 RX ADMIN — SODIUM CHLORIDE 4 MILLILITER(S): 9 INJECTION INTRAMUSCULAR; INTRAVENOUS; SUBCUTANEOUS at 03:52

## 2019-01-10 RX ADMIN — SODIUM CHLORIDE 4 MILLILITER(S): 9 INJECTION INTRAMUSCULAR; INTRAVENOUS; SUBCUTANEOUS at 10:55

## 2019-01-10 RX ADMIN — Medication 50 MILLIGRAM(S): at 17:55

## 2019-01-10 RX ADMIN — CHLORHEXIDINE GLUCONATE 1 APPLICATION(S): 213 SOLUTION TOPICAL at 19:00

## 2019-01-10 RX ADMIN — HUMAN INSULIN 10 UNIT(S): 100 INJECTION, SUSPENSION SUBCUTANEOUS at 00:01

## 2019-01-10 RX ADMIN — Medication 40 MILLIGRAM(S): at 13:14

## 2019-01-10 RX ADMIN — HEPARIN SODIUM 5000 UNIT(S): 5000 INJECTION INTRAVENOUS; SUBCUTANEOUS at 21:16

## 2019-01-10 RX ADMIN — Medication 4: at 23:56

## 2019-01-10 RX ADMIN — Medication 5 MILLIGRAM(S): at 05:00

## 2019-01-10 RX ADMIN — PANTOPRAZOLE SODIUM 40 MILLIGRAM(S): 20 TABLET, DELAYED RELEASE ORAL at 21:16

## 2019-01-10 RX ADMIN — Medication 10 MILLIGRAM(S): at 10:03

## 2019-01-10 RX ADMIN — Medication 400 MILLIGRAM(S): at 13:14

## 2019-01-10 RX ADMIN — Medication 40 MILLIGRAM(S): at 23:57

## 2019-01-10 RX ADMIN — HUMAN INSULIN 14 UNIT(S): 100 INJECTION, SUSPENSION SUBCUTANEOUS at 17:56

## 2019-01-10 RX ADMIN — CHLORHEXIDINE GLUCONATE 15 MILLILITER(S): 213 SOLUTION TOPICAL at 05:00

## 2019-01-10 RX ADMIN — ALBUTEROL 1 PUFF(S): 90 AEROSOL, METERED ORAL at 20:17

## 2019-01-10 RX ADMIN — Medication 5 MILLIGRAM(S): at 17:56

## 2019-01-10 RX ADMIN — Medication 125 MICROGRAM(S): at 21:16

## 2019-01-10 RX ADMIN — CHLORHEXIDINE GLUCONATE 1 APPLICATION(S): 213 SOLUTION TOPICAL at 01:40

## 2019-01-10 RX ADMIN — Medication 50 MILLIGRAM(S): at 05:00

## 2019-01-10 RX ADMIN — PANTOPRAZOLE SODIUM 40 MILLIGRAM(S): 20 TABLET, DELAYED RELEASE ORAL at 11:09

## 2019-01-10 NOTE — PROGRESS NOTE ADULT - ASSESSMENT
ASSESSMENT:  63y Female hx of COPD presented initially with SBO, underwent surgery closed with 4 retention sutures, evicerated on 12/30 and closed with 6 more retention sutures, now c/b multifocal pneumonia, flu and respiratory distress /hypoxia on continuous bipap. Patient temporarily transferred to RCU, but developed hypercapnia and respiratory distress requiring intubation 1/6 and subsequent transfer to SICU. S/p bronch 1/9/18    #neuro  Awake  - Sedation with fentanyl and precedex held this am for SBT  - Attempt sedation holiday in AM    #resp  - Intubated on AC. Attempting SBT this morning  - Will check ABG and trend PaO2/FiO2 ratio  - Hx COPD and pulm HTN, current smoker  - C/w duoneb, pulmonary toilet    #cv  Total body fluid overload  - Off pressors  - Vitals stable  - amlodipine increased to 10mg  - Monitor A-line site, difficult Arterial access low axillary/brachial    #GI  - s/p ex-lap  for evisceration, retention sutures placed  - c/w protonix daily  - c/w TF    #renal  - hx CKD  - BUN/Crt wnl  - C/w iraheta  - monitor I's&O's  - repletions PRN    #heme  - sqh for dvt ppx    #ID  - trend fevers  - c/w zosyn and tamiflu  -f/u bronch cultures  -if febrile may obtain CT     #endo  - hx RA on chronic prednisone, DM  - continue hydrocortisone, wean to q12   - Lantus 10u increased to 15u, ISS    #dispo  -SICU      Critical Care Dx:  - Respiratory failure  - CHRIS  - Morbid obesity  - Evisceration  - Severe protein/calorie malnutrition  - Pulmonary HTN  - COPD  - Adrenal insufficiency ASSESSMENT:  63y Female hx of COPD presented initially with SBO, underwent surgery closed with 4 retention sutures, evicerated on 12/30 and closed with 6 more retention sutures, now c/b multifocal pneumonia, flu and respiratory distress /hypoxia on continuous bipap. Patient temporarily transferred to RCU, but developed hypercapnia and respiratory distress requiring intubation 1/6 and subsequent transfer to SICU. S/p bronch 1/9/18    #neuro  Awake, alert, follows commands, RASS 0  - Sedation with fentanyl and precedex held this am  - Attempt sedation holiday in AM    #resp  - Intubated on AC. Attempting SBT this morning  - Will check ABG and trend PaO2/FiO2 ratio  - Hx COPD and pulm HTN, current smoker  - C/w duoneb, pulmonary toilet    #cv  Total body fluid overload  - Off pressors  - Vitals stable  - amlodipine increased to 10mg  - Monitor A-line site, difficult Arterial access low axillary/brachial    #GI  - s/p ex-lap  for evisceration, retention sutures placed  - c/w protonix daily  - c/w TF    #renal  - hx CKD  - BUN/Crt wnl  - C/w iraheta  - monitor I's&O's  - repletions PRN    #heme  - sqh for dvt ppx    #ID  - trend fevers  - c/w zosyn and tamiflu  -f/u bronch cultures  -if febrile may obtain CT     #endo  - hx RA on chronic prednisone, DM  - continue hydrocortisone, wean to q12   - Lantus 10u increased to 15u, ISS    #dispo  -SICU      Critical Care Dx:  - Respiratory failure  - CHRIS  - Morbid obesity  - Evisceration  - Severe protein/calorie malnutrition  - Pulmonary HTN  - COPD  - Adrenal insufficiency ASSESSMENT:  63y Female hx of COPD presented initially with SBO, underwent surgery closed with 4 retention sutures, evicerated on 12/30 and closed with 6 more retention sutures, now c/b multifocal pneumonia, flu and respiratory distress /hypoxia on continuous bipap. Patient temporarily transferred to RCU, but developed hypercapnia and respiratory distress requiring intubation 1/6 and subsequent transfer to SICU. S/p bronch 1/9/18    #neuro  Awake, alert, follows commands, RASS 0  - Sedation with fentanyl and precedex, try to wean off as tolerated   - Attempt sedation holiday in AM daily    #resp  - Intubated, CXR AM for tube position checks  - Will check ABG and trend PaO2/FiO2 ratio  - Hx COPD and pulm HTN, current smoker  - C/w duoneb, pulmonary toilet    #cv  Total body fluid overload  - Off pressors  - Vitals stable  - amlodipine increased to 10mg  - Monitor A-line site, difficult Arterial access low axillary/brachial    #GI  - s/p ex-lap  for evisceration, retention sutures placed  - c/w protonix daily  - c/w TF    #renal  - hx CKD  - BUN/Crt wnl  - C/w iraheta  - monitor I's&O's  - repletions PRN    #heme  - sqh for dvt ppx    #ID  - trend fevers  - c/w zosyn and tamiflu  -f/u bronch cultures  -if febrile may obtain CT     #endo  - hx RA on chronic prednisone, DM  - continue hydrocortisone, wean to q12   - Lantus 10u increased to 15u, ISS    #dispo  -SICU      Critical Care Dx:  - Respiratory failure  - CHRIS  - Morbid obesity  - Evisceration  - Severe protein/calorie malnutrition  - Pulmonary HTN  - COPD  - Adrenal insufficiency ASSESSMENT:  63y Female hx of COPD presented initially with SBO, underwent surgery closed with 4 retention sutures, evicerated on 12/30 and closed with 6 more retention sutures, now c/b multifocal pneumonia, flu and respiratory distress /hypoxia on continuous bipap. Patient temporarily transferred to RCU, but developed hypercapnia and respiratory distress requiring intubation 1/6 and subsequent transfer to SICU. S/p bronch 1/9/18    #neuro  Awake, alert, follows commands, RASS 0  - Sedation with fentanyl and precedex, try to wean off as tolerated   - Attempt sedation holiday in AM daily    #resp  - Hx COPD and pulm HTN, current smoker, course c/b viral pneumonia, s/p bronch 1/9  - Intubated, AC to CPAP now, FIO2 60%, try to wean   - daily CXR AM for tube position checks  - ABG checks as appropriate  - C/w inhalers, pulmozyme, pulmonary toilet  -f/u BAL studies   -reconsult pulmonology as she has been following Dr Lisker as outpt     #CV  - Vitals stable  - ct amlodipine 10mg  -ct metoprolol 5 mg IV q6h   - Monitor A-line site, changed over guidewire on 1/9, difficult Arterial access low axillary/brachial    #GI  - s/p ex-lap  for evisceration, retention sutures placed, daily wound check   - c/w protonix daily  - c/w TF 41 ml/hr, prosource added for proteins   - appreciate surgery recs     #renal  - BMP and I & O monitoring   - C/w iraheta for UOP monitoring   - repletion of electrolytes PRN    #heme  -stable H/H, s/p 1 prbc transfusion 1/8  - subcut heparin 500 u for dvt ppx    #ID  - afebrile, WBC counts stable   -D/C zosyn and tamiflu  -f/u bronch cultures  -if febrile may obtain CT  -lines: iraheta, rt IJV, rt brachial A line, amaury, ET      #endo  - hx RA on chronic prednisone, DM  - continue hydrocortisone q12 today, change to qd lisette, taper to prednisone 10 mg od as per home dose later  - regular insulin 10u increased to 14 u q6h while 24 hr continuous tube feeds continued, low dose corrective scale q4h    #dispo  -SICU      Critical Care Dx:  - Respiratory failure  -viral pneumonia  - CHRIS  - Morbid obesity  - Evisceration  - Severe protein/calorie malnutrition  - Pulmonary HTN  - COPD  - Adrenal insufficiency ASSESSMENT:  63y Female hx of COPD presented initially with SBO, underwent surgery closed with 4 retention sutures, evicerated on 12/30 and closed with 6 more retention sutures, now c/b multifocal pneumonia, flu and respiratory distress /hypoxia on continuous bipap. Patient temporarily transferred to RCU, but developed hypercapnia and respiratory distress requiring intubation 1/6 and subsequent transfer to SICU. S/p bronch 1/9/18    #neuro  Awake, alert, follows commands, RASS 0  - Sedation with fentanyl and precedex, try to wean off as tolerated   - Attempt sedation holiday in AM daily    #resp  - Hx COPD and pulm HTN, current smoker, course c/b viral pneumonia, s/p bronch 1/9  - Intubated, on CPAP now, FIO2 60%, try to wean down, trial to wean down on FiO2 failed  - daily CXR AM for tube position checks  - ABG checks as appropriate  - C/w inhalers, pulmozyme, pulmonary toilet   - reconsult pulmonology as she has been following Dr Lisker as outpt     #CV  - Vitals stable  - ct amlodipine 10mg  -ct metoprolol 5 mg IV q6h   - Monitor A-line site, changed over guidewire on 1/9, difficult Arterial access low axillary/brachial    #GI  - s/p ex-lap  for evisceration, retention sutures placed, daily wound check   - c/w protonix daily  - c/w TF 41 ml/hr, prosource added for proteins   - appreciate surgery recs     #renal  - BMP and I & O monitoring   - C/w iraheta for UOP monitoring   - repletion of electrolytes PRN    #heme  -stable H/H, s/p 1 prbc transfusion 1/8  - subcut heparin 500 u for dvt ppx    #ID  - afebrile, WBC counts stable   -D/C zosyn and tamiflu  -f/u bronch cultures  -if febrile may obtain CT  -lines: iraheta, rt IJV, rt brachial A line, amaury, ET      #endo  - hx RA on chronic prednisone, DM  - continue hydrocortisone q12 today, change to qd lisette, taper to prednisone 10 mg od as per home dose later  - regular insulin 10u increased to 14 u q6h while 24 hr continuous tube feeds continued, low dose corrective scale q4h    #dispo  -SICU      Critical Care Dx:  - Respiratory failure  -viral pneumonia  - CHRIS  - Morbid obesity  - Evisceration  - Severe protein/calorie malnutrition  - Pulmonary HTN  - COPD  - Adrenal insufficiency ASSESSMENT:  63y Female hx of COPD presented initially with SBO, underwent surgery closed with 4 retention sutures, evicerated on 12/30 and closed with 6 more retention sutures, now c/b multifocal pneumonia, flu and respiratory distress /hypoxia on continuous bipap. Patient temporarily transferred to RCU, but developed hypercapnia and respiratory distress requiring intubation 1/6 and subsequent transfer to SICU. S/p bronch 1/9/18    #neuro  Awake, alert, follows commands, RASS 0  - Sedation with fentanyl and precedex, try to wean off as tolerated   - Attempt sedation holiday in AM daily    #resp  - Hx COPD and pulm HTN, current smoker, course c/b viral pneumonia, s/p bronch 1/9  - Intubated, on CPAP now, FIO2 60%, try to wean down, trial to wean down on FiO2 failed  - daily CXR AM for tube position checks  - ABG checks as appropriate  - C/w inhalers, pulmozyme, pulmonary toilet   - reconsult pulmonology as she has been following Dr Lisker as outpt     #CV  - Vitals stable  - ct amlodipine 10mg  -ct metoprolol 5 mg IV q6h   - Monitor A-line site, changed over guidewire on 1/9, difficult Arterial access low axillary/brachial    #GI  - s/p ex-lap  for evisceration, retention sutures placed, daily wound check   - c/w protonix daily  - c/w TF 41 ml/hr, prosource added for proteins   - appreciate surgery recs     #renal  - BMP and I & O monitoring   - C/w iraheta for UOP monitoring   - repletion of electrolytes PRN  - Lasix 20mg BID    #heme  -stable H/H, s/p 1 prbc transfusion 1/8  - subcut heparin 500 u for dvt ppx    #ID  - afebrile, WBC counts stable   -D/C zosyn and tamiflu  -f/u bronch cultures  -if febrile may obtain CT  -lines: iraheta, rt IJV, rt brachial A line, amaury ET      #endo  - hx RA on chronic prednisone, DM  - continue hydrocortisone q12 today, change to qd lisette, taper to prednisone 10 mg od as per home dose later  - regular insulin 10u increased to 14 u q6h while 24 hr continuous tube feeds continued, low dose corrective scale q4h    #dispo  -SICU      Critical Care Dx:  - Respiratory failure  -viral pneumonia  - CHRIS  - Morbid obesity  - Evisceration  - Severe protein/calorie malnutrition  - Pulmonary HTN  - COPD  - Adrenal insufficiency

## 2019-01-10 NOTE — PROGRESS NOTE ADULT - ASSESSMENT
- Out of bed to chair  - continue CPAP trial  - Continue with NaCl nebs and chest PT  - Agree with plan for extubation tomorrow    Colleen Tidwell MD  Pulmonary & Critical Care Medicine Fellow | PGY-4  [phone] 63F with PMHx rheumatoid arthritis (on chronic Prednisone 10mg QD x30 years), morbid obesity, CHRIS/OHS on qhs NIV bilevel, former smoker with COPD and pulmHTN (WHO I/III) who presented to [facility] 12/15 with acute onset abdominal pain and NBNB vomiting 2/2 SBO s/p emergent exploratory laparotomy with lysis of adhesions (12/16) c/b persistent episodes of nausea/vomiting, repeat OR on 12/30 with prolonged intubation. Was then transferred ot RCU after extubation where she developed progressive respiratory distress was found to be Influenza A+ adn required intubation for respiratory failure. Has been on Cpap since this morning with good tidal volumes. Increased Peep with goal of lung recruitment. Currently comfortable and interactive, not on any sedation.   - Out of bed to chair  - continue CPAP trial  - Agree with diuresis per primary team  - Agree with current inhaler therapy: Symbicort 160-4.5 mcg 2 puffs BID and Spiriva 18mcg qhs. Can c/w Albuterol inhaler prn. No indication for escalation of therapy.  - Continue with NaCl nebs and chest PT  - Agree with weaning of stress dose steroids (on prednisone eat home for RA)  - Agree with plan for extubation tomorrow    Colleen Tidwell MD  Pulmonary & Critical Care Medicine Fellow | PGY-4  [phone]

## 2019-01-10 NOTE — PROGRESS NOTE ADULT - ATTENDING COMMENTS
Critical Care Dx    Acute Respiratory failure with chronic underlying COPD, hypoxic/hypercapneic   -P/F ratio improving   -Flu +, Tamiflu stopped today after 5 days.   -Bilateral infiltrates, requiring elevated PEEP  -CPAP, goal to lower Fi02 < 60%   -trend ABG    Hyperglycemia  -Endocrine consult prefers [facility] due to steroid timing. Increased to 10units yesterday q6. Will trend levels.   -Steroids decreased yesterday but still higher than baseline home requirement   -con't tube feeds    Multifocal pneumonia, +Flu  -Zosyn and tamiflu d/c  -Bronch and BAL done yesterday revealed no mucus or secretions/pus. BAL likely showing normal airway jackelyn  -trend CXR, ABG's     Hypothyroidism  -Con't IV conversion of synthroid     RA, COPD  -stress dose steroids given baseline requirement of prednisone and now with surgical/inflammatory stress.  -wean off stress dose today and continue to lower slowly    At risk for malnutrition  -reaching goal tube feedings but has been underfed in post-surgical time period  -monitor prealbumin weekly     The patient is a critical care patient with life threatening respiratory instability in SICU.  I have personally interviewed and examined the patient, reviewed data and laboratory tests/x-rays and all pertinent electronic images.  The SICU team has a constant risk benefit analyzes discussion with the primary team, all consultants, House Staff and PA's on all decisions.  These diagnoses are unrelated to the surgical procedure noted.  Time involved in performance of separately billable procedures was not counted toward my critical care time. There is no overlap.    I spent 40 minutes in total providing critical care for the diagnoses, assessment and plan above.      I was physically present for the key portions of the evaluation and management (E/M) service provided.  I agree with the above history, physical, and plan which I have reviewed and edited where appropriate.     Ryan Bear MD  Acute and Critical Care Surgery .

## 2019-01-10 NOTE — PROGRESS NOTE ADULT - ATTENDING COMMENTS
I have examined pt and agree with above exam and plan above.    Pt remains on mechanical ventilation wit acute hypoxemic respiratory failure. Increasing peep needed for lung requirement to maintain O2 sats.  Recommend oob to chair to help with diminishing atelecatsis with hope of decreasing peep  in preparation for extubation.

## 2019-01-10 NOTE — PROGRESS NOTE ADULT - SUBJECTIVE AND OBJECTIVE BOX
HISTORY  63y Female hx of COPD presented initially with SBO, underwent surgery closed with 4 retention sutures, evicerated on 12/30 and closed with 6 more retention sutures, now c/b multifocal pneumonia, flu and respiratory distress /hypoxia on continuous bipap. Patient temporarily transferred to RCU, but developed hypercapnia and respiratory distress requiring intubation 1/6 and subsequent transfer to SICU.     24 HOUR EVENTS: Patient remained on precedex and fentanyl infusions overnight. Patient placed back on AC.     SUBJECTIVE/ROS:  [x] A ten-point review of systems was otherwise negative except as noted.  [ ] Due to altered mental status/intubation, subjective information were not able to be obtained from the patient. History was obtained, to the extent possible, from review of the chart and collateral sources of information.      NEURO  RASS: making eye contact and following commands when precedex weaned down  Meds: fentaNYL   Infusion 2.5 MICROgram(s)/kG/Hr IV Continuous <Continuous>  Precedex infusion     RESPIRATORY  RR: 22 (01-09-19 @ 00:00) (20 - 23)  SpO2: 97% (01-09-19 @ 00:00) (90% - 100%)  Wt(kg): --  Exam: unlabored, clear to auscultation bilaterally  Mechanical Ventilation: Mode: AC/ CMV (Assist Control/ Continuous Mandatory Ventilation), RR (machine): 22, RR (patient): 22, TV (machine): 400, FiO2: 50, PEEP: 10, MAP: 16, PIP: 28  ABG - ( 08 Jan 2019 17:38 )  pH: 7.45  /  pCO2: 48    /  pO2: 128   / HCO3: 32    / Base Excess: 8.2   /  SaO2: 97.8    Lactate: x      [x] Extubation Readiness Assessed  Meds: ALBUTerol    90 MICROgram(s) HFA Inhaler 1 Puff(s) Inhalation every 6 hours  buDESOnide 160 MICROgram(s)/formoterol 4.5 MICROgram(s) Inhaler 2 Puff(s) Inhalation two times a day  sodium chloride 3%  Inhalation 4 milliLiter(s) Inhalation every 6 hours    CARDIOVASCULAR  ICU Vital Signs Last 24 Hrs  T(C): 37.1 (10 Soy 2019 00:00), Max: 38.8 (09 Jan 2019 10:00)  T(F): 98.7 (10 Soy 2019 00:00), Max: 101.8 (09 Jan 2019 10:00)  HR: 62 (10 Soy 2019 00:00) (58 - 121)  BP: 188/84 (09 Jan 2019 07:30) (188/84 - 188/84)  BP(mean): 111 (09 Jan 2019 07:30) (111 - 111)  ABP: 136/73 (10 Soy 2019 00:00) (103/52 - 189/89)  ABP(mean): 97 (10 Soy 2019 00:00) (76 - 135)  RR: 22 (10 Soy 2019 00:00) (16 - 23)  SpO2: 98% (10 Soy 2019 00:00) (89% - 100%)    Exam: RRR  Perfusion     [x]Adequate   [ ]Inadequate  Mentation   [x]Normal       [ ]Reduced  Meds: amLODIPine   Tablet 5 milliGRAM(s) Oral daily  furosemide   Injectable 40 milliGRAM(s) IV Push two times a day  metoprolol tartrate Injectable 5 milliGRAM(s) IV Push every 6 hours    GI/NUTRITION  Exam: Retention sutures intact, abd S/NT/ND  Diet: TF@goal  Meds: pantoprazole  Injectable 40 milliGRAM(s) IV Push daily    GENITOURINARY  I&O's Detail    01-07 @ 07:01  -  01-08 @ 07:00  --------------------------------------------------------      01-08 @ 07:01  - 01-09 @ 00:23  --------------------------------------------------------      CBC Full  -  ( 09 Jan 2019 09:18 )  WBC Count : 14.71 K/uL  Hemoglobin : 8.8 g/dL  Hematocrit : 27.6 %  Platelet Count - Automated : 599 K/uL  Mean Cell Volume : 93.6 fL  Mean Cell Hemoglobin : 29.8 pg  Mean Cell Hemoglobin Concentration : 31.9 %  Auto Neutrophil # : x  Auto Lymphocyte # : x  Auto Monocyte # : x  Auto Eosinophil # : x  Auto Basophil # : x  Auto Neutrophil % : x  Auto Lymphocyte % : x  Auto Monocyte % : x  Auto Eosinophil % : x  Auto Basophil % : x      [ ] Antoine catheter, indication: N/A  Meds:   HEMATOLOGIC  Meds: heparin  Injectable 5000 Unit(s) SubCutaneous every 8 hours    [x] VTE Prophylaxis                        7.6    9.47  )-----------( 497      ( 08 Jan 2019 00:00 )             24.3     Transfusion     [ ] PRBC   [ ] Platelets   [ ] FFP   [ ] Cryoprecipitate  INFECTIOUS DISEASES  T(C): 38.1 (01-09-19 @ 00:00), Max: 38.3 (01-08-19 @ 20:00)  Wt(kg): --    Recent Cultures:  Specimen Source: SPUTUM, 01-02 @ 13:11; Results --; Gram Stain: --; Organism: --    Meds: influenza   Vaccine 0.5 milliLiter(s) IntraMuscular once  oseltamivir 30 milliGRAM(s) Oral two times a day  piperacillin/tazobactam IVPB. 3.375 Gram(s) IV Intermittent every 8 hours    ENDOCRINE  Capillary Blood Glucose  Meds: dextrose 50% Injectable 25 milliLiter(s) IV Push once  hydrocortisone sodium succinate Injectable 50 milliGRAM(s) IV Push every 8 hours  insulin lispro (HumaLOG) corrective regimen sliding scale   SubCutaneous every 4 hours  insulin NPH human recombinant 10 Unit(s) SubCutaneous every 6 hours  levothyroxine Injectable 96 MICROGram(s) IV Push at bedtime  simvastatin 20 milliGRAM(s) Oral at bedtime    ACCESS DEVICES:  [x] Peripheral IV  [x] Central Venous Line	[ ] R	[ ] L	[ ] IJ	[ ] Fem	[ ] SC	Placed:   [ ] Arterial Line		[ ] R	[ ] L	[ ] Fem	[ ] Rad	[ ] Ax	Placed:   [ ] PICC:					[ ] Mediport  [ ] Urinary Catheter, Date Placed:   [ ] Necessity of urinary, arterial, and venous catheters discussed    OTHER MEDICATIONS:  chlorhexidine 0.12% Liquid 15 milliLiter(s) Swish and Spit two times a day  chlorhexidine 4% Liquid 1 Application(s) Topical <User Schedule> HISTORY  63y Female hx of COPD presented initially with SBO, underwent surgery closed with 4 retention sutures, evicerated on 12/30 and closed with 6 more retention sutures, now c/b multifocal pneumonia, flu and respiratory distress /hypoxia on continuous bipap. Patient temporarily transferred to RCU, but developed hypercapnia and respiratory distress requiring intubation 1/6 and subsequent transfer to SICU.     24 HOUR EVENTS: Patient remained on precedex and fentanyl infusions overnight. Patient placed back on AC, changed to CPAP in AM. FiO2 at 50%     SUBJECTIVE/ROS:  [x] A ten-point review of systems was otherwise negative except as noted.  [ ] Due to altered mental status/intubation, subjective information were not able to be obtained from the patient. History was obtained, to the extent possible, from review of the chart and collateral sources of information.      NEURO  RASS: making eye contact and following commands when precedex weaned down  Meds: fentaNYL   Infusion 2.5 MICROgram(s)/kG/Hr IV Continuous <Continuous>  Precedex infusion     RESPIRATORY  RR: 22 (01-09-19 @ 00:00) (20 - 23)  SpO2: 97% (01-09-19 @ 00:00) (90% - 100%)  Wt(kg): --  Exam: unlabored, clear to auscultation bilaterally  Mechanical Ventilation: Mode: AC/ CMV (Assist Control/ Continuous Mandatory Ventilation), RR (machine): 22, RR (patient): 22, TV (machine): 400, FiO2: 50, PEEP: 10, MAP: 16, PIP: 28  ABG - ( 08 Jan 2019 17:38 )  pH: 7.45  /  pCO2: 48    /  pO2: 128   / HCO3: 32    / Base Excess: 8.2   /  SaO2: 97.8    Lactate: x      [x] Extubation Readiness Assessed  Meds: ALBUTerol    90 MICROgram(s) HFA Inhaler 1 Puff(s) Inhalation every 6 hours  buDESOnide 160 MICROgram(s)/formoterol 4.5 MICROgram(s) Inhaler 2 Puff(s) Inhalation two times a day  sodium chloride 3%  Inhalation 4 milliLiter(s) Inhalation every 6 hours    CARDIOVASCULAR  ICU Vital Signs Last 24 Hrs  T(C): 37.1 (10 Soy 2019 00:00), Max: 38.8 (09 Jan 2019 10:00)  T(F): 98.7 (10 Soy 2019 00:00), Max: 101.8 (09 Jan 2019 10:00)  HR: 62 (10 Soy 2019 00:00) (58 - 121)  BP: 188/84 (09 Jan 2019 07:30) (188/84 - 188/84)  BP(mean): 111 (09 Jan 2019 07:30) (111 - 111)  ABP: 136/73 (10 Soy 2019 00:00) (103/52 - 189/89)  ABP(mean): 97 (10 Soy 2019 00:00) (76 - 135)  RR: 22 (10 Soy 2019 00:00) (16 - 23)  SpO2: 98% (10 Soy 2019 00:00) (89% - 100%)    Exam: RRR  Perfusion     [x]Adequate   [ ]Inadequate  Mentation   [x]Normal       [ ]Reduced  Meds: amLODIPine   Tablet 5 milliGRAM(s) Oral daily  furosemide   Injectable 40 milliGRAM(s) IV Push two times a day  metoprolol tartrate Injectable 5 milliGRAM(s) IV Push every 6 hours    GI/NUTRITION  Exam: Retention sutures intact, abd S/NT/ND  Diet: TF@goal  Meds: pantoprazole  Injectable 40 milliGRAM(s) IV Push daily    GENITOURINARY  I&O's Detail    01-07 @ 07:01  -  01-08 @ 07:00  --------------------------------------------------------      01-08 @ 07:01  -  01-09 @ 00:23  --------------------------------------------------------      CBC Full  -  ( 09 Jan 2019 09:18 )  WBC Count : 14.71 K/uL  Hemoglobin : 8.8 g/dL  Hematocrit : 27.6 %  Platelet Count - Automated : 599 K/uL  Mean Cell Volume : 93.6 fL  Mean Cell Hemoglobin : 29.8 pg  Mean Cell Hemoglobin Concentration : 31.9 %  Auto Neutrophil # : x  Auto Lymphocyte # : x  Auto Monocyte # : x  Auto Eosinophil # : x  Auto Basophil # : x  Auto Neutrophil % : x  Auto Lymphocyte % : x  Auto Monocyte % : x  Auto Eosinophil % : x  Auto Basophil % : x      [ ] Antoine catheter, indication: N/A  Meds:   HEMATOLOGIC  Meds: heparin  Injectable 5000 Unit(s) SubCutaneous every 8 hours    [x] VTE Prophylaxis                        7.6    9.47  )-----------( 497      ( 08 Jan 2019 00:00 )             24.3     Transfusion     [ ] PRBC   [ ] Platelets   [ ] FFP   [ ] Cryoprecipitate  INFECTIOUS DISEASES  T(C): 38.1 (01-09-19 @ 00:00), Max: 38.3 (01-08-19 @ 20:00)  Wt(kg): --    Recent Cultures:  Specimen Source: SPUTUM, 01-02 @ 13:11; Results --; Gram Stain: --; Organism: --    Meds: influenza   Vaccine 0.5 milliLiter(s) IntraMuscular once  oseltamivir 30 milliGRAM(s) Oral two times a day  piperacillin/tazobactam IVPB. 3.375 Gram(s) IV Intermittent every 8 hours    ENDOCRINE  Capillary Blood Glucose  Meds: dextrose 50% Injectable 25 milliLiter(s) IV Push once  hydrocortisone sodium succinate Injectable 50 milliGRAM(s) IV Push every 8 hours  insulin lispro (HumaLOG) corrective regimen sliding scale   SubCutaneous every 4 hours  insulin NPH human recombinant 10 Unit(s) SubCutaneous every 6 hours  levothyroxine Injectable 96 MICROGram(s) IV Push at bedtime  simvastatin 20 milliGRAM(s) Oral at bedtime    ACCESS DEVICES:  [x] Peripheral IV  [x] Central Venous Line	[ ] R	[ ] L	[ ] IJ	[ ] Fem	[ ] SC	Placed:   [ ] Arterial Line		[ ] R	[ ] L	[ ] Fem	[ ] Rad	[ ] Ax	Placed:   [ ] PICC:					[ ] Mediport  [ ] Urinary Catheter, Date Placed:   [ ] Necessity of urinary, arterial, and venous catheters discussed    OTHER MEDICATIONS:  chlorhexidine 0.12% Liquid 15 milliLiter(s) Swish and Spit two times a day  chlorhexidine 4% Liquid 1 Application(s) Topical <User Schedule>

## 2019-01-10 NOTE — PROGRESS NOTE ADULT - SUBJECTIVE AND OBJECTIVE BOX
Surgery Progress Note    S: Patient seen and examined. No acute events overnight. Patient remains intubated on vent requiring PEEP  to be 10. Patient tolerated CPAP y/d for 6hs. TF currently running at 40cc/hr.     O:  Vital Signs Last 24 Hrs  T(C): 37.7 (10 Soy 2019 04:00), Max: 38.8 (09 Jan 2019 10:00)  T(F): 99.8 (10 Soy 2019 04:00), Max: 101.8 (09 Jan 2019 10:00)  HR: 78 (10 Soy 2019 08:00) (58 - 112)  BP: --  BP(mean): --  RR: 22 (10 Soy 2019 08:00) (16 - 23)  SpO2: 95% (10 Soy 2019 08:00) (90% - 100%)    I&O's Detail    09 Jan 2019 07:01  -  10 Soy 2019 07:00  --------------------------------------------------------  IN:    dexmedetomidine Infusion: 358.1 mL    fentaNYL  Infusion: 442.4 mL    Glucerna: 984 mL    IV PiggyBack: 400 mL    propofol Infusion: 6.7 mL  Total IN: 2191.2 mL    OUT:    Indwelling Catheter - Urethral: 3805 mL  Total OUT: 3805 mL    Total NET: -1613.8 mL          MEDICATIONS  (STANDING):  ALBUTerol    90 MICROgram(s) HFA Inhaler 1 Puff(s) Inhalation every 6 hours  amLODIPine   Tablet 10 milliGRAM(s) Oral daily  buDESOnide 160 MICROgram(s)/formoterol 4.5 MICROgram(s) Inhaler 2 Puff(s) Inhalation two times a day  chlorhexidine 0.12% Liquid 15 milliLiter(s) Swish and Spit two times a day  chlorhexidine 4% Liquid 1 Application(s) Topical <User Schedule>  dexmedetomidine Infusion 0.3 MICROgram(s)/kG/Hr (8.415 mL/Hr) IV Continuous <Continuous>  dextrose 50% Injectable 25 milliLiter(s) IV Push once  fentaNYL   Infusion 0.5 MICROgram(s)/kG/Hr (5.61 mL/Hr) IV Continuous <Continuous>  furosemide   Injectable 40 milliGRAM(s) IV Push two times a day  heparin  Injectable 5000 Unit(s) SubCutaneous every 8 hours  hydrocortisone sodium succinate Injectable 50 milliGRAM(s) IV Push every 12 hours  influenza   Vaccine 0.5 milliLiter(s) IntraMuscular once  insulin lispro (HumaLOG) corrective regimen sliding scale   SubCutaneous every 4 hours  insulin NPH human recombinant 14 Unit(s) SubCutaneous every 6 hours  levothyroxine Injectable 96 MICROGram(s) IV Push at bedtime  metoprolol tartrate Injectable 5 milliGRAM(s) IV Push every 6 hours  pantoprazole  Injectable 40 milliGRAM(s) IV Push daily  simvastatin 20 milliGRAM(s) Oral at bedtime  sodium chloride 3%  Inhalation 4 milliLiter(s) Inhalation every 6 hours    MEDICATIONS  (PRN):                            8.9    13.83 )-----------( 571      ( 10 Soy 2019 02:45 )             27.9       01-10    146<H>  |  98  |  22  ----------------------------<  228<H>  3.1<L>   |  32<H>  |  0.92    Ca    9.4      10 Soy 2019 02:45  Phos  3.0     01-10  Mg     1.8     01-10        Physical Exam:  Gen: Laying in bed, NAD  Resp: Unlabored breathing  Abd: soft, NT, moderately distended, rentention sutures in place   Ext: WWP  Skin: No rashes

## 2019-01-11 LAB
ANION GAP SERPL CALC-SCNC: 13 MEQ/L — SIGNIFICANT CHANGE UP (ref 7–14)
APPEARANCE UR: CLEAR — SIGNIFICANT CHANGE UP
BACTERIA # UR AUTO: NEGATIVE — SIGNIFICANT CHANGE UP
BACTERIA SPT RESP CULT: SIGNIFICANT CHANGE UP
BASE EXCESS BLDA CALC-SCNC: 12.5 MMOL/L — SIGNIFICANT CHANGE UP
BASE EXCESS BLDA CALC-SCNC: 12.8 MMOL/L — SIGNIFICANT CHANGE UP
BASOPHILS # BLD AUTO: 0.02 K/UL — SIGNIFICANT CHANGE UP (ref 0–0.2)
BASOPHILS NFR BLD AUTO: 0.2 % — SIGNIFICANT CHANGE UP (ref 0–2)
BILIRUB UR-MCNC: NEGATIVE — SIGNIFICANT CHANGE UP
BLOOD UR QL VISUAL: SIGNIFICANT CHANGE UP
BUN SERPL-MCNC: 20 MG/DL — SIGNIFICANT CHANGE UP (ref 7–23)
CA-I BLDA-SCNC: 1.14 MMOL/L — LOW (ref 1.15–1.29)
CA-I BLDA-SCNC: 1.16 MMOL/L — SIGNIFICANT CHANGE UP (ref 1.15–1.29)
CALCIUM SERPL-MCNC: 9.4 MG/DL — SIGNIFICANT CHANGE UP (ref 8.4–10.5)
CHLORIDE SERPL-SCNC: 100 MMOL/L — SIGNIFICANT CHANGE UP (ref 98–107)
CO2 SERPL-SCNC: 34 MMOL/L — HIGH (ref 22–31)
COLOR SPEC: YELLOW — SIGNIFICANT CHANGE UP
CREAT SERPL-MCNC: 0.81 MG/DL — SIGNIFICANT CHANGE UP (ref 0.5–1.3)
EOSINOPHIL # BLD AUTO: 0.1 K/UL — SIGNIFICANT CHANGE UP (ref 0–0.5)
EOSINOPHIL NFR BLD AUTO: 1 % — SIGNIFICANT CHANGE UP (ref 0–6)
GLUCOSE BLDA-MCNC: 134 MG/DL — HIGH (ref 70–99)
GLUCOSE BLDA-MCNC: 183 MG/DL — HIGH (ref 70–99)
GLUCOSE BLDC GLUCOMTR-MCNC: 119 MG/DL — HIGH (ref 70–99)
GLUCOSE BLDC GLUCOMTR-MCNC: 121 MG/DL — HIGH (ref 70–99)
GLUCOSE BLDC GLUCOMTR-MCNC: 132 MG/DL — HIGH (ref 70–99)
GLUCOSE BLDC GLUCOMTR-MCNC: 156 MG/DL — HIGH (ref 70–99)
GLUCOSE SERPL-MCNC: 181 MG/DL — HIGH (ref 70–99)
GLUCOSE UR-MCNC: NEGATIVE — SIGNIFICANT CHANGE UP
HCO3 BLDA-SCNC: 36 MMOL/L — HIGH (ref 22–26)
HCO3 BLDA-SCNC: 36 MMOL/L — HIGH (ref 22–26)
HCT VFR BLD CALC: 27 % — LOW (ref 34.5–45)
HCT VFR BLDA CALC: 26.9 % — LOW (ref 34.5–46.5)
HCT VFR BLDA CALC: 27 % — LOW (ref 34.5–46.5)
HGB BLD-MCNC: 8.5 G/DL — LOW (ref 11.5–15.5)
HGB BLDA-MCNC: 8.7 G/DL — LOW (ref 11.5–15.5)
HGB BLDA-MCNC: 8.7 G/DL — LOW (ref 11.5–15.5)
HYALINE CASTS # UR AUTO: NEGATIVE — SIGNIFICANT CHANGE UP
IMM GRANULOCYTES NFR BLD AUTO: 3.5 % — HIGH (ref 0–1.5)
KETONES UR-MCNC: NEGATIVE — SIGNIFICANT CHANGE UP
LACTATE BLDA-SCNC: 1 MMOL/L — SIGNIFICANT CHANGE UP (ref 0.5–2)
LACTATE BLDA-SCNC: 1.9 MMOL/L — SIGNIFICANT CHANGE UP (ref 0.5–2)
LEUKOCYTE ESTERASE UR-ACNC: NEGATIVE — SIGNIFICANT CHANGE UP
LYMPHOCYTES # BLD AUTO: 0.64 K/UL — LOW (ref 1–3.3)
LYMPHOCYTES # BLD AUTO: 6.1 % — LOW (ref 13–44)
MAGNESIUM SERPL-MCNC: 2 MG/DL — SIGNIFICANT CHANGE UP (ref 1.6–2.6)
MCHC RBC-ENTMCNC: 30.4 PG — SIGNIFICANT CHANGE UP (ref 27–34)
MCHC RBC-ENTMCNC: 31.5 % — LOW (ref 32–36)
MCV RBC AUTO: 96.4 FL — SIGNIFICANT CHANGE UP (ref 80–100)
MONOCYTES # BLD AUTO: 0.77 K/UL — SIGNIFICANT CHANGE UP (ref 0–0.9)
MONOCYTES NFR BLD AUTO: 7.4 % — SIGNIFICANT CHANGE UP (ref 2–14)
NEUTROPHILS # BLD AUTO: 8.54 K/UL — HIGH (ref 1.8–7.4)
NEUTROPHILS NFR BLD AUTO: 81.8 % — HIGH (ref 43–77)
NITRITE UR-MCNC: NEGATIVE — SIGNIFICANT CHANGE UP
NRBC # FLD: 0.11 K/UL — LOW (ref 25–125)
NRBC FLD-RTO: 1.1 — SIGNIFICANT CHANGE UP
PCO2 BLDA: 47 MMHG — SIGNIFICANT CHANGE UP (ref 32–48)
PCO2 BLDA: 49 MMHG — HIGH (ref 32–48)
PH BLDA: 7.49 PH — HIGH (ref 7.35–7.45)
PH BLDA: 7.51 PH — HIGH (ref 7.35–7.45)
PH UR: 7.5 — SIGNIFICANT CHANGE UP (ref 5–8)
PHOSPHATE SERPL-MCNC: 3.2 MG/DL — SIGNIFICANT CHANGE UP (ref 2.5–4.5)
PLATELET # BLD AUTO: 513 K/UL — HIGH (ref 150–400)
PMV BLD: 9.4 FL — SIGNIFICANT CHANGE UP (ref 7–13)
PO2 BLDA: 115 MMHG — HIGH (ref 83–108)
PO2 BLDA: 80 MMHG — LOW (ref 83–108)
POTASSIUM BLDA-SCNC: 3.1 MMOL/L — LOW (ref 3.4–4.5)
POTASSIUM BLDA-SCNC: 4 MMOL/L — SIGNIFICANT CHANGE UP (ref 3.4–4.5)
POTASSIUM SERPL-MCNC: 3.5 MMOL/L — SIGNIFICANT CHANGE UP (ref 3.5–5.3)
POTASSIUM SERPL-SCNC: 3.5 MMOL/L — SIGNIFICANT CHANGE UP (ref 3.5–5.3)
PROT UR-MCNC: 100 — HIGH
RBC # BLD: 2.8 M/UL — LOW (ref 3.8–5.2)
RBC # FLD: 16.5 % — HIGH (ref 10.3–14.5)
RBC CASTS # UR COMP ASSIST: SIGNIFICANT CHANGE UP (ref 0–?)
SAO2 % BLDA: 95.8 % — SIGNIFICANT CHANGE UP (ref 95–99)
SAO2 % BLDA: 96.3 % — SIGNIFICANT CHANGE UP (ref 95–99)
SODIUM BLDA-SCNC: 141 MMOL/L — SIGNIFICANT CHANGE UP (ref 136–146)
SODIUM BLDA-SCNC: 143 MMOL/L — SIGNIFICANT CHANGE UP (ref 136–146)
SODIUM SERPL-SCNC: 147 MMOL/L — HIGH (ref 135–145)
SP GR SPEC: 1.02 — SIGNIFICANT CHANGE UP (ref 1–1.04)
SQUAMOUS # UR AUTO: SIGNIFICANT CHANGE UP
UROBILINOGEN FLD QL: HIGH
WBC # BLD: 10.44 K/UL — SIGNIFICANT CHANGE UP (ref 3.8–10.5)
WBC # FLD AUTO: 10.44 K/UL — SIGNIFICANT CHANGE UP (ref 3.8–10.5)
WBC UR QL: SIGNIFICANT CHANGE UP (ref 0–?)
YEAST BUDDING # UR COMP ASSIST: SIGNIFICANT CHANGE UP

## 2019-01-11 PROCEDURE — 71045 X-RAY EXAM CHEST 1 VIEW: CPT | Mod: 26,77

## 2019-01-11 PROCEDURE — 99291 CRITICAL CARE FIRST HOUR: CPT

## 2019-01-11 PROCEDURE — 71045 X-RAY EXAM CHEST 1 VIEW: CPT | Mod: 26

## 2019-01-11 PROCEDURE — 99232 SBSQ HOSP IP/OBS MODERATE 35: CPT

## 2019-01-11 PROCEDURE — 99233 SBSQ HOSP IP/OBS HIGH 50: CPT | Mod: GC

## 2019-01-11 RX ORDER — HYDROCORTISONE 20 MG
50 TABLET ORAL EVERY 24 HOURS
Qty: 0 | Refills: 0 | Status: DISCONTINUED | OUTPATIENT
Start: 2019-01-11 | End: 2019-01-11

## 2019-01-11 RX ORDER — ACETAZOLAMIDE 250 MG/1
250 TABLET ORAL
Qty: 0 | Refills: 0 | Status: DISCONTINUED | OUTPATIENT
Start: 2019-01-11 | End: 2019-01-11

## 2019-01-11 RX ORDER — HYDROCORTISONE 20 MG
25 TABLET ORAL EVERY 12 HOURS
Qty: 0 | Refills: 0 | Status: DISCONTINUED | OUTPATIENT
Start: 2019-01-11 | End: 2019-01-21

## 2019-01-11 RX ORDER — POTASSIUM CHLORIDE 20 MEQ
20 PACKET (EA) ORAL
Qty: 0 | Refills: 0 | Status: COMPLETED | OUTPATIENT
Start: 2019-01-11 | End: 2019-01-11

## 2019-01-11 RX ORDER — POTASSIUM CHLORIDE 20 MEQ
20 PACKET (EA) ORAL ONCE
Qty: 0 | Refills: 0 | Status: COMPLETED | OUTPATIENT
Start: 2019-01-11 | End: 2019-01-11

## 2019-01-11 RX ORDER — ACETAMINOPHEN 500 MG
650 TABLET ORAL EVERY 6 HOURS
Qty: 0 | Refills: 0 | Status: COMPLETED | OUTPATIENT
Start: 2019-01-11 | End: 2019-01-13

## 2019-01-11 RX ORDER — LEVOTHYROXINE SODIUM 125 MCG
137 TABLET ORAL DAILY
Qty: 0 | Refills: 0 | Status: DISCONTINUED | OUTPATIENT
Start: 2019-01-11 | End: 2019-02-10

## 2019-01-11 RX ORDER — HYDROMORPHONE HYDROCHLORIDE 2 MG/ML
1 INJECTION INTRAMUSCULAR; INTRAVENOUS; SUBCUTANEOUS EVERY 4 HOURS
Qty: 0 | Refills: 0 | Status: DISCONTINUED | OUTPATIENT
Start: 2019-01-11 | End: 2019-01-12

## 2019-01-11 RX ORDER — HYDROMORPHONE HYDROCHLORIDE 2 MG/ML
1 INJECTION INTRAMUSCULAR; INTRAVENOUS; SUBCUTANEOUS EVERY 4 HOURS
Qty: 0 | Refills: 0 | Status: DISCONTINUED | OUTPATIENT
Start: 2019-01-11 | End: 2019-01-11

## 2019-01-11 RX ORDER — ACETAZOLAMIDE 250 MG/1
250 TABLET ORAL
Qty: 0 | Refills: 0 | Status: DISCONTINUED | OUTPATIENT
Start: 2019-01-11 | End: 2019-01-12

## 2019-01-11 RX ORDER — FUROSEMIDE 40 MG
40 TABLET ORAL DAILY
Qty: 0 | Refills: 0 | Status: DISCONTINUED | OUTPATIENT
Start: 2019-01-11 | End: 2019-01-30

## 2019-01-11 RX ORDER — GABAPENTIN 400 MG/1
300 CAPSULE ORAL
Qty: 0 | Refills: 0 | Status: DISCONTINUED | OUTPATIENT
Start: 2019-01-11 | End: 2019-01-16

## 2019-01-11 RX ADMIN — BUDESONIDE AND FORMOTEROL FUMARATE DIHYDRATE 2 PUFF(S): 160; 4.5 AEROSOL RESPIRATORY (INHALATION) at 09:33

## 2019-01-11 RX ADMIN — HEPARIN SODIUM 5000 UNIT(S): 5000 INJECTION INTRAVENOUS; SUBCUTANEOUS at 21:07

## 2019-01-11 RX ADMIN — Medication 650 MILLIGRAM(S): at 23:40

## 2019-01-11 RX ADMIN — DEXMEDETOMIDINE HYDROCHLORIDE IN 0.9% SODIUM CHLORIDE 8.41 MICROGRAM(S)/KG/HR: 4 INJECTION INTRAVENOUS at 07:40

## 2019-01-11 RX ADMIN — PANTOPRAZOLE SODIUM 40 MILLIGRAM(S): 20 TABLET, DELAYED RELEASE ORAL at 12:24

## 2019-01-11 RX ADMIN — HYDROMORPHONE HYDROCHLORIDE 1 MILLIGRAM(S): 2 INJECTION INTRAMUSCULAR; INTRAVENOUS; SUBCUTANEOUS at 22:09

## 2019-01-11 RX ADMIN — Medication 50 MILLIEQUIVALENT(S): at 13:26

## 2019-01-11 RX ADMIN — HYDROMORPHONE HYDROCHLORIDE 1 MILLIGRAM(S): 2 INJECTION INTRAMUSCULAR; INTRAVENOUS; SUBCUTANEOUS at 22:40

## 2019-01-11 RX ADMIN — Medication 2: at 23:40

## 2019-01-11 RX ADMIN — Medication 40 MILLIGRAM(S): at 12:24

## 2019-01-11 RX ADMIN — HYDROMORPHONE HYDROCHLORIDE 1 MILLIGRAM(S): 2 INJECTION INTRAMUSCULAR; INTRAVENOUS; SUBCUTANEOUS at 15:30

## 2019-01-11 RX ADMIN — SODIUM CHLORIDE 4 MILLILITER(S): 9 INJECTION INTRAMUSCULAR; INTRAVENOUS; SUBCUTANEOUS at 03:12

## 2019-01-11 RX ADMIN — SODIUM CHLORIDE 4 MILLILITER(S): 9 INJECTION INTRAMUSCULAR; INTRAVENOUS; SUBCUTANEOUS at 21:29

## 2019-01-11 RX ADMIN — SODIUM CHLORIDE 4 MILLILITER(S): 9 INJECTION INTRAMUSCULAR; INTRAVENOUS; SUBCUTANEOUS at 09:32

## 2019-01-11 RX ADMIN — ALBUTEROL 1 PUFF(S): 90 AEROSOL, METERED ORAL at 15:20

## 2019-01-11 RX ADMIN — HEPARIN SODIUM 5000 UNIT(S): 5000 INJECTION INTRAVENOUS; SUBCUTANEOUS at 13:26

## 2019-01-11 RX ADMIN — HUMAN INSULIN 14 UNIT(S): 100 INJECTION, SUSPENSION SUBCUTANEOUS at 23:40

## 2019-01-11 RX ADMIN — CHLORHEXIDINE GLUCONATE 15 MILLILITER(S): 213 SOLUTION TOPICAL at 05:43

## 2019-01-11 RX ADMIN — CHLORHEXIDINE GLUCONATE 15 MILLILITER(S): 213 SOLUTION TOPICAL at 17:37

## 2019-01-11 RX ADMIN — Medication 137 MICROGRAM(S): at 12:34

## 2019-01-11 RX ADMIN — Medication 650 MILLIGRAM(S): at 17:36

## 2019-01-11 RX ADMIN — Medication 25 MILLIGRAM(S): at 17:37

## 2019-01-11 RX ADMIN — DEXMEDETOMIDINE HYDROCHLORIDE IN 0.9% SODIUM CHLORIDE 8.41 MICROGRAM(S)/KG/HR: 4 INJECTION INTRAVENOUS at 19:56

## 2019-01-11 RX ADMIN — GABAPENTIN 300 MILLIGRAM(S): 400 CAPSULE ORAL at 05:45

## 2019-01-11 RX ADMIN — FENTANYL CITRATE 5.61 MICROGRAM(S)/KG/HR: 50 INJECTION INTRAVENOUS at 07:40

## 2019-01-11 RX ADMIN — GABAPENTIN 300 MILLIGRAM(S): 400 CAPSULE ORAL at 17:37

## 2019-01-11 RX ADMIN — Medication 50 MILLIEQUIVALENT(S): at 12:24

## 2019-01-11 RX ADMIN — ALBUTEROL 1 PUFF(S): 90 AEROSOL, METERED ORAL at 09:32

## 2019-01-11 RX ADMIN — HEPARIN SODIUM 5000 UNIT(S): 5000 INJECTION INTRAVENOUS; SUBCUTANEOUS at 05:43

## 2019-01-11 RX ADMIN — HUMAN INSULIN 14 UNIT(S): 100 INJECTION, SUSPENSION SUBCUTANEOUS at 12:24

## 2019-01-11 RX ADMIN — ACETAZOLAMIDE 250 MILLIGRAM(S): 250 TABLET ORAL at 18:31

## 2019-01-11 RX ADMIN — Medication 650 MILLIGRAM(S): at 05:47

## 2019-01-11 RX ADMIN — CHLORHEXIDINE GLUCONATE 1 APPLICATION(S): 213 SOLUTION TOPICAL at 09:06

## 2019-01-11 RX ADMIN — Medication 50 MILLIGRAM(S): at 05:44

## 2019-01-11 RX ADMIN — ALBUTEROL 1 PUFF(S): 90 AEROSOL, METERED ORAL at 03:12

## 2019-01-11 RX ADMIN — Medication 25 MILLIGRAM(S): at 05:49

## 2019-01-11 RX ADMIN — SIMVASTATIN 20 MILLIGRAM(S): 20 TABLET, FILM COATED ORAL at 21:07

## 2019-01-11 RX ADMIN — BUDESONIDE AND FORMOTEROL FUMARATE DIHYDRATE 2 PUFF(S): 160; 4.5 AEROSOL RESPIRATORY (INHALATION) at 21:42

## 2019-01-11 RX ADMIN — AMLODIPINE BESYLATE 10 MILLIGRAM(S): 2.5 TABLET ORAL at 05:43

## 2019-01-11 RX ADMIN — HUMAN INSULIN 14 UNIT(S): 100 INJECTION, SUSPENSION SUBCUTANEOUS at 17:44

## 2019-01-11 RX ADMIN — Medication 650 MILLIGRAM(S): at 06:00

## 2019-01-11 RX ADMIN — SODIUM CHLORIDE 4 MILLILITER(S): 9 INJECTION INTRAMUSCULAR; INTRAVENOUS; SUBCUTANEOUS at 15:16

## 2019-01-11 RX ADMIN — HUMAN INSULIN 14 UNIT(S): 100 INJECTION, SUSPENSION SUBCUTANEOUS at 05:45

## 2019-01-11 RX ADMIN — ALBUTEROL 1 PUFF(S): 90 AEROSOL, METERED ORAL at 21:40

## 2019-01-11 RX ADMIN — Medication 650 MILLIGRAM(S): at 12:24

## 2019-01-11 RX ADMIN — HYDROMORPHONE HYDROCHLORIDE 1 MILLIGRAM(S): 2 INJECTION INTRAMUSCULAR; INTRAVENOUS; SUBCUTANEOUS at 15:01

## 2019-01-11 RX ADMIN — Medication 100 MILLIEQUIVALENT(S): at 05:42

## 2019-01-11 NOTE — PROGRESS NOTE ADULT - ASSESSMENT
63 F with PMH of COPD (on home O2 (3L), hypothyroidism, T2DM (HbA1c 8s 11/2018, on insulin), RA on prednisone (10 mg daily x 30 years), HTN,  admitted for SBO s/p ex lap with lysis of adhesions, now intubated for hypercapnic respiratory failure. Patient is on Glucerna tube feeds.      endocrine called for DM management

## 2019-01-11 NOTE — PROGRESS NOTE ADULT - ASSESSMENT
63F with PMHx rheumatoid arthritis (on chronic Prednisone 10mg QD x30 years), morbid obesity, CHRIS/OHS on qhs NIV bilevel, former smoker with COPD and pulmHTN (WHO I/III) who presented to [facility] 12/15 with acute onset abdominal pain and NBNB vomiting 2/2 SBO s/p emergent exploratory laparotomy with lysis of adhesions (12/16) c/b persistent episodes of nausea/vomiting, repeat OR on 12/30 with prolonged intubation. Was then transferred ot RCU after extubation where she developed progressive respiratory distress was found to be Influenza A+ adn required intubation for respiratory failure. Has been on Cpap since this morning with good tidal volumes. Increased Peep with goal of lung recruitment. Currently comfortable and interactive, on minimal sedation (fentaly used given hx of chronic pain).   - Out of bed to chair  - continue CPAP trial  - Diuresis with Diamox, to help correct the metabolic alkalosis (concern she will had a decreased ventilatory drive if extubated)  - If ABG improves (improved pH) with Diamox, agree with plan for extubation tomorrow  - Agree with current inhaler therapy: Symbicort 160-4.5 mcg 2 puffs BID and Spiriva 18mcg qhs. Can c/w Albuterol inhaler prn. No indication for escalation of therapy.  - Continue with NaCl nebs and chest PT  Colleen Tidwell MD  Pulmonary & Critical Care Medicine Fellow | PGY-4  [phone]

## 2019-01-11 NOTE — PROGRESS NOTE ADULT - SUBJECTIVE AND OBJECTIVE BOX
SICU Daily Progress Note  =====================================================  Interval/Overnight Events: NPH increased to 14units. Antibiotics stopped.     63y Female hx of COPD presented initially with SBO, underwent surgery closed with 4 retention sutures, eviscerated on 12/30 and closed with 6 more retention sutures, now c/b multifocal pneumonia, flu and respiratory distress /hypoxia on continuous bipap. Patient temporarily transferred to RCU, but developed hypercapnia and respiratory distress requiring intubation 1/6 and subsequent transfer to SICU.     24 HOUR EVENTS: Patient remained on precedex and fentanyl infusions overnight. Patient placed back on AC, changed to CPAP in AM. FiO2 at 50%     Allergies: No Known Allergies    MEDICATIONS:   --------------------------------------------------------------------------------------  Neurologic Medications  dexmedetomidine Infusion 0.3 MICROgram(s)/kG/Hr IV Continuous <Continuous>  fentaNYL   Infusion 0.5 MICROgram(s)/kG/Hr IV Continuous <Continuous>    Respiratory Medications  ALBUTerol    90 MICROgram(s) HFA Inhaler 1 Puff(s) Inhalation every 6 hours  buDESOnide 160 MICROgram(s)/formoterol 4.5 MICROgram(s) Inhaler 2 Puff(s) Inhalation two times a day  sodium chloride 3%  Inhalation 4 milliLiter(s) Inhalation every 6 hours    Cardiovascular Medications  amLODIPine   Tablet 10 milliGRAM(s) Oral daily  furosemide   Injectable 40 milliGRAM(s) IV Push two times a day  metoprolol tartrate 25 milliGRAM(s) Enteral Tube two times a day    Gastrointestinal Medications  pantoprazole   Suspension 40 milliGRAM(s) Oral daily    Genitourinary Medications    Hematologic/Oncologic Medications  heparin  Injectable 5000 Unit(s) SubCutaneous every 8 hours  influenza   Vaccine 0.5 milliLiter(s) IntraMuscular once    Antimicrobial/Immunologic Medications    Endocrine/Metabolic Medications  dextrose 50% Injectable 25 milliLiter(s) IV Push once  hydrocortisone sodium succinate Injectable 50 milliGRAM(s) IV Push every 12 hours  insulin lispro (HumaLOG) corrective regimen sliding scale   SubCutaneous every 6 hours  insulin NPH human recombinant 14 Unit(s) SubCutaneous every 6 hours  levothyroxine 125 MICROGram(s) Enteral Tube at bedtime  simvastatin 20 milliGRAM(s) Oral at bedtime    Topical/Other Medications  chlorhexidine 0.12% Liquid 15 milliLiter(s) Swish and Spit two times a day  chlorhexidine 4% Liquid 1 Application(s) Topical <User Schedule>    --------------------------------------------------------------------------------------    VITAL SIGNS, INS/OUTS (last 24 hours):  --------------------------------------------------------------------------------------  ((Insert SICU Vitals/Is+Os here))***  --------------------------------------------------------------------------------------    EXAM  NEUROLOGY  RASS: 0 to -1  Exam: Follows commands, tracks when off sedation    HEENT  Exam: Normocephalic, atraumatic, EOMI.      RESPIRATORY  Exam: Lungs clear to auscultation, Normal expansion/effort.  Mechanical Ventilation: Mode: CPAP with PS, FiO2: 60, PEEP: 10, PS: 5, MAP: 12.2    CARDIOVASCULAR  Exam: S1, S2.  Regular rate and rhythm.    GI/NUTRITION  Exam: Abdomen with retention sutures in place, soft, ND  Current Diet:  Glucerna @ goal 41ml/hr x24hrs    VASCULAR  Exam: Extremities warm, pink, well-perfused.    MUSCULOSKELETAL  Exam: All extremities moving spontaneously without limitations.    SKIN  Exam: Good skin turgor, no skin breakdown.     METABOLIC/FLUIDS/ELECTROLYTES      HEMATOLOGIC  [x] VTE Prophylaxis: heparin  Injectable 5000 Unit(s) SubCutaneous every 8 hours    Transfusions:	[] PRBC	[] Platelets		[] FFP	[] Cryoprecipitate    INFECTIOUS DISEASE  Antimicrobials/Immunologic Medications:  influenza   Vaccine 0.5 milliLiter(s) IntraMuscular once    Tubes/Lines/Drains  [x] Peripheral IV  [] Central Venous Line     	[] R	[] L	[] IJ	[] Fem	[] SC	Date Placed:   [] Arterial Line		[] R	[] L	[] Fem	[] Rad	[] Ax	Date Placed:   [] PICC		[] Midline		[] Mediport  [] Urinary Catheter		Date Placed:   [x] Necessity of urinary, arterial, and venous catheters discussed    LABS  --------------------------------------------------------------------------------------  ((Insert SICU Labs here))***  --------------------------------------------------------------------------------------    OTHER LABORATORY:     IMAGING STUDIES:   CXR: SICU Daily Progress Note  =====================================================  Interval/Overnight Events: NPH increased to 14units. Antibiotics stopped.     63y Female hx of COPD presented initially with SBO, underwent surgery closed with 4 retention sutures, eviscerated on 12/30 and closed with 6 more retention sutures, now c/b multifocal pneumonia, flu and respiratory distress /hypoxia on continuous bipap. Patient temporarily transferred to RCU, but developed hypercapnia and respiratory distress requiring intubation 1/6 and subsequent transfer to SICU.     Allergies: No Known Allergies    MEDICATIONS:   --------------------------------------------------------------------------------------  Neurologic Medications  dexmedetomidine Infusion 0.3 MICROgram(s)/kG/Hr IV Continuous <Continuous>  fentaNYL   Infusion 0.5 MICROgram(s)/kG/Hr IV Continuous <Continuous>    Respiratory Medications  ALBUTerol    90 MICROgram(s) HFA Inhaler 1 Puff(s) Inhalation every 6 hours  buDESOnide 160 MICROgram(s)/formoterol 4.5 MICROgram(s) Inhaler 2 Puff(s) Inhalation two times a day  sodium chloride 3%  Inhalation 4 milliLiter(s) Inhalation every 6 hours    Cardiovascular Medications  amLODIPine   Tablet 10 milliGRAM(s) Oral daily  furosemide   Injectable 40 milliGRAM(s) IV Push two times a day  metoprolol tartrate 25 milliGRAM(s) Enteral Tube two times a day    Gastrointestinal Medications  pantoprazole   Suspension 40 milliGRAM(s) Oral daily    Genitourinary Medications    Hematologic/Oncologic Medications  heparin  Injectable 5000 Unit(s) SubCutaneous every 8 hours  influenza   Vaccine 0.5 milliLiter(s) IntraMuscular once    Antimicrobial/Immunologic Medications    Endocrine/Metabolic Medications  dextrose 50% Injectable 25 milliLiter(s) IV Push once  hydrocortisone sodium succinate Injectable 50 milliGRAM(s) IV Push every 12 hours  insulin lispro (HumaLOG) corrective regimen sliding scale   SubCutaneous every 6 hours  insulin NPH human recombinant 14 Unit(s) SubCutaneous every 6 hours  levothyroxine 125 MICROGram(s) Enteral Tube at bedtime  simvastatin 20 milliGRAM(s) Oral at bedtime    Topical/Other Medications  chlorhexidine 0.12% Liquid 15 milliLiter(s) Swish and Spit two times a day  chlorhexidine 4% Liquid 1 Application(s) Topical <User Schedule>    --------------------------------------------------------------------------------------    VITAL SIGNS, INS/OUTS (last 24 hours):  --------------------------------------------------------------------------------------  ((Insert SICU Vitals/Is+Os here))***  --------------------------------------------------------------------------------------    EXAM  NEUROLOGY  RASS: 0 to -1  Exam: Follows commands, tracks when off sedation    HEENT  Exam: Normocephalic, atraumatic, EOMI.      RESPIRATORY  Exam: Lungs clear to auscultation, Normal expansion/effort.  Mechanical Ventilation: Mode: CPAP with PS, FiO2: 60, PEEP: 10, PS: 5, MAP: 12.2    CARDIOVASCULAR  Exam: S1, S2.  Regular rate and rhythm.    GI/NUTRITION  Exam: Abdomen with retention sutures in place, soft, ND  Current Diet:  Glucerna @ goal 41ml/hr x24hrs    VASCULAR  Exam: Extremities warm, pink, well-perfused.    MUSCULOSKELETAL  Exam: All extremities moving spontaneously without limitations.    SKIN  Exam: Good skin turgor, no skin breakdown.     METABOLIC/FLUIDS/ELECTROLYTES      HEMATOLOGIC  [x] VTE Prophylaxis: heparin  Injectable 5000 Unit(s) SubCutaneous every 8 hours    Transfusions:	[] PRBC	[] Platelets		[] FFP	[] Cryoprecipitate    INFECTIOUS DISEASE  Antimicrobials/Immunologic Medications:  influenza   Vaccine 0.5 milliLiter(s) IntraMuscular once    Tubes/Lines/Drains  [x] Peripheral IV  [] Central Venous Line     	[] R	[] L	[] IJ	[] Fem	[] SC	Date Placed:   [] Arterial Line		[] R	[] L	[] Fem	[] Rad	[] Ax	Date Placed:   [] PICC		[] Midline		[] Mediport  [] Urinary Catheter		Date Placed:   [x] Necessity of urinary, arterial, and venous catheters discussed    LABS  --------------------------------------------------------------------------------------  ((Insert SICU Labs here))***  --------------------------------------------------------------------------------------    OTHER LABORATORY:     IMAGING STUDIES:   CXR: SICU Daily Progress Note  =====================================================  Interval/Overnight Events: NPH increased to 14units. Antibiotics stopped. Gabapentin initiated yesterday.     63y Female hx of COPD presented initially with SBO, underwent surgery closed with 4 retention sutures, eviscerated on 12/30 and closed with 6 more retention sutures, now c/b multifocal pneumonia, flu and respiratory distress /hypoxia on continuous bipap. Patient temporarily transferred to RCU, but developed hypercapnia and respiratory distress requiring intubation 1/6 and subsequent transfer to SICU.     Allergies: No Known Allergies    MEDICATIONS:   --------------------------------------------------------------------------------------  Neurologic Medications  dexmedetomidine Infusion 0.3 MICROgram(s)/kG/Hr IV Continuous <Continuous>  fentaNYL   Infusion 0.5 MICROgram(s)/kG/Hr IV Continuous <Continuous>    Respiratory Medications  ALBUTerol    90 MICROgram(s) HFA Inhaler 1 Puff(s) Inhalation every 6 hours  buDESOnide 160 MICROgram(s)/formoterol 4.5 MICROgram(s) Inhaler 2 Puff(s) Inhalation two times a day  sodium chloride 3%  Inhalation 4 milliLiter(s) Inhalation every 6 hours    Cardiovascular Medications  amLODIPine   Tablet 10 milliGRAM(s) Oral daily  furosemide   Injectable 40 milliGRAM(s) IV Push two times a day  metoprolol tartrate 25 milliGRAM(s) Enteral Tube two times a day    Gastrointestinal Medications  pantoprazole   Suspension 40 milliGRAM(s) Oral daily    Genitourinary Medications    Hematologic/Oncologic Medications  heparin  Injectable 5000 Unit(s) SubCutaneous every 8 hours  influenza   Vaccine 0.5 milliLiter(s) IntraMuscular once    Antimicrobial/Immunologic Medications    Endocrine/Metabolic Medications  dextrose 50% Injectable 25 milliLiter(s) IV Push once  hydrocortisone sodium succinate Injectable 50 milliGRAM(s) IV Push every 12 hours  insulin lispro (HumaLOG) corrective regimen sliding scale   SubCutaneous every 6 hours  insulin NPH human recombinant 14 Unit(s) SubCutaneous every 6 hours  levothyroxine 125 MICROGram(s) Enteral Tube at bedtime  simvastatin 20 milliGRAM(s) Oral at bedtime    Topical/Other Medications  chlorhexidine 0.12% Liquid 15 milliLiter(s) Swish and Spit two times a day  chlorhexidine 4% Liquid 1 Application(s) Topical <User Schedule>    --------------------------------------------------------------------------------------    VITAL SIGNS, INS/OUTS (last 24 hours):  --------------------------------------------------------------------------------------  ((Insert SICU Vitals/Is+Os here))***  --------------------------------------------------------------------------------------    EXAM  NEUROLOGY  RASS: 0 to -1  Exam: Follows commands, tracks when off sedation    HEENT  Exam: Normocephalic, atraumatic, EOMI.      RESPIRATORY  Exam: Lungs clear to auscultation, Normal expansion/effort.  Mechanical Ventilation: Mode: CPAP with PS, FiO2: 60, PEEP: 10, PS: 5, MAP: 12.2    CARDIOVASCULAR  Exam: S1, S2.  Regular rate and rhythm.    GI/NUTRITION  Exam: Abdomen with retention sutures in place, soft, ND  Current Diet:  Glucerna @ goal 41ml/hr x24hrs    VASCULAR  Exam: Extremities warm, pink, well-perfused.    MUSCULOSKELETAL  Exam: All extremities moving spontaneously without limitations.    SKIN  Exam: Good skin turgor, no skin breakdown.     METABOLIC/FLUIDS/ELECTROLYTES      HEMATOLOGIC  [x] VTE Prophylaxis: heparin  Injectable 5000 Unit(s) SubCutaneous every 8 hours    Transfusions:	[] PRBC	[] Platelets		[] FFP	[] Cryoprecipitate    INFECTIOUS DISEASE  Antimicrobials/Immunologic Medications:  influenza   Vaccine 0.5 milliLiter(s) IntraMuscular once    Tubes/Lines/Drains  [x] Peripheral IV  [] Central Venous Line     	[] R	[] L	[] IJ	[] Fem	[] SC	Date Placed:   [] Arterial Line		[] R	[] L	[] Fem	[] Rad	[] Ax	Date Placed:   [] PICC		[] Midline		[] Mediport  [] Urinary Catheter		Date Placed:   [x] Necessity of urinary, arterial, and venous catheters discussed    LABS  --------------------------------------------------------------------------------------  ((Insert SICU Labs here))***  --------------------------------------------------------------------------------------    OTHER LABORATORY:     IMAGING STUDIES:   CXR:

## 2019-01-11 NOTE — PROGRESS NOTE ADULT - ATTENDING COMMENTS
CPAPing well.  Metabolic alkalosis noted on labs.   Recommend Diamox and metabolic alkalosis can cause compensatory hypoventilation and diminish success of extubation.  If ABG improves with Diamox (pH normalizes, do not need to normalize pCO2) can proceed with extubation.

## 2019-01-11 NOTE — PROGRESS NOTE ADULT - PROBLEM SELECTOR PLAN 1
BG control is improved. On 24 hour feeds.  Continue NPH 14 units q6h. Hold if feeds are held.  -Continue correction scale moderate Humalog scale q6h  Goal glucose 100-180

## 2019-01-11 NOTE — PROGRESS NOTE ADULT - PROBLEM SELECTOR PLAN 3
currently on hydrocortisone 50 q2h but hydrocortisone does not last 24 hours - would change to 25mg IV q12h and when appropriate can change to prednisone (pt is on pred 10mg chronically so would not go lower than this dose.)

## 2019-01-11 NOTE — PROGRESS NOTE ADULT - SUBJECTIVE AND OBJECTIVE BOX
Pulmonary Progress Note     Interval Events: Spiked temp of 100.5F. O2 needs improved with sitting in chair    SUBJECTIVE: intubated, non-verbal. Denies any pain    OBJECTIVE:  ICU Vital Signs Last 24 Hrs  T(C): 38.1 (2019 20:00), Max: 38.3 (2019 08:00)  T(F): 100.5 (2019 20:00), Max: 101 (2019 08:00)  HR: 81 (2019 20:00) (62 - 113)  ABP: 154/87 (2019 20:00) (105/89 - 164/80)  ABP(mean): 114 (2019 20:00) (79 - 126)  RR: 26 (2019 20:00) (17 - 29)  SpO2: 95% (:00) (92% - 100%)    Mode: AC/ CMV (Assist Control/ Continuous Mandatory Ventilation), RR (machine): 22, TV (machine): 400, FiO2: 50, PEEP: 8, MAP: 14, PIP: 28    01-10 @ 07: @ 07:00  --------------------------------------------------------  IN: 1575.4 mL / OUT: 3660 mL / NET: -2084.6 mL     @ 07: @ 20:02  --------------------------------------------------------  IN: 652.6 mL / OUT: 1310 mL / NET: -657.4 mL    CAPILLARY BLOOD GLUCOSE    POCT Blood Glucose.: 119 mg/dL (2019 17:39)      PHYSICAL EXAM:  General: NAD, appears comfortably on vent  HEENT: NCAT, MOM, EOMI  Neck: Supple  Respiratory: coarse breath sounds bilaterally   Cardiovascular: RRR, S1/S2, (-) m/g/r, (-) pitting edema  Extremities: (-) clubbing/cyanosis/edema  Skin: c/d/i. NGT in place   Neurological: No new focal findings  Psychiatry: Appropriate    HOSPITAL MEDICATIONS:  MEDICATIONS  (STANDING):  acetaminophen    Suspension .. 650 milliGRAM(s) Oral every 6 hours  acetazolamide Injectable 250 milliGRAM(s) IV Push two times a day  ALBUTerol    90 MICROgram(s) HFA Inhaler 1 Puff(s) Inhalation every 6 hours  amLODIPine   Tablet 10 milliGRAM(s) Oral daily  buDESOnide 160 MICROgram(s)/formoterol 4.5 MICROgram(s) Inhaler 2 Puff(s) Inhalation two times a day  chlorhexidine 0.12% Liquid 15 milliLiter(s) Swish and Spit two times a day  chlorhexidine 4% Liquid 1 Application(s) Topical <User Schedule>  dexmedetomidine Infusion 0.3 MICROgram(s)/kG/Hr (8.415 mL/Hr) IV Continuous <Continuous>  dextrose 50% Injectable 25 milliLiter(s) IV Push once  furosemide   Injectable 40 milliGRAM(s) IV Push daily  gabapentin   Solution 300 milliGRAM(s) Oral two times a day  heparin  Injectable 5000 Unit(s) SubCutaneous every 8 hours  hydrocortisone sodium succinate Injectable 25 milliGRAM(s) IV Push every 12 hours  influenza   Vaccine 0.5 milliLiter(s) IntraMuscular once  insulin lispro (HumaLOG) corrective regimen sliding scale   SubCutaneous every 6 hours  insulin NPH human recombinant 14 Unit(s) SubCutaneous every 6 hours  levothyroxine 137 MICROGram(s) Oral daily  metoprolol tartrate 25 milliGRAM(s) Enteral Tube two times a day  pantoprazole   Suspension 40 milliGRAM(s) Oral daily  simvastatin 20 milliGRAM(s) Oral at bedtime  sodium chloride 3%  Inhalation 4 milliLiter(s) Inhalation every 6 hours    MEDICATIONS  (PRN):  HYDROmorphone   Tablet 1 milliGRAM(s) Oral every 4 hours PRN Severe Pain (7 - 10)      LABS:                        8.5    10.44 )-----------( 513      ( 2019 02:10 )             27.0     Hgb Trend: 8.5<--, 8.9<--, 8.8<--, 7.5<--, 6.8<--  01-11    147<H>  |  100  |  20  ----------------------------<  181<H>  3.5   |  34<H>  |  0.81    Ca    9.4      2019 02:10  Phos  3.2       Mg     2.0           Creatinine Trend: 0.81<--, 0.89<--, 0.92<--, 1.08<--, 1.04<--, 0.91<--    Urinalysis Basic - ( 2019 12:45 )    Color: YELLOW / Appearance: CLEAR / S.021 / pH: 7.5  Gluc: NEGATIVE / Ketone: NEGATIVE  / Bili: NEGATIVE / Urobili: LARGE   Blood: TRACE / Protein: 100 / Nitrite: NEGATIVE   Leuk Esterase: NEGATIVE / RBC: 5-10 / WBC 3-5   Sq Epi: OCC / Non Sq Epi: x / Bacteria: NEGATIVE      Arterial Blood Gas:   @ 13:30  7.51/47/115/36/96.3/12.5  ABG lactate: 1.0  Arterial Blood Gas:   @ 02:10  7.49/49/80/36/95.8/12.8  ABG lactate: 1.9  Arterial Blood Gas:  01-10 @ 11:30  7.49/45/61/33/90.7/9.3  ABG lactate: 1.2  Arterial Blood Gas:  01-10 @ 09:10  7.47/49/74/34/94.4/10.6  ABG lactate: 1.4  Arterial Blood Gas:  01-10 @ 02:45  7.46/48/58/33/86.4/9.6  ABG lactate: 2.6  Arterial Blood Gas:   @ 20:45  7.45/48/68/32/93.9/8.3  ABG lactate: 1.8    MICROBIOLOGY:   Culture - Respiratory with Gram Stain (collected 2019 12:31)  Source: BRONCHIAL [facility]AGE  Final Report (2019 10:29):    NRF^Normal Respiratory Perla    QUANTITY OF GROWTH: MANY    RADIOLOGY:  [x] Reviewed and interpreted by me    PULMONARY FUNCTION TESTS:

## 2019-01-11 NOTE — PROGRESS NOTE ADULT - ASSESSMENT
63y Female hx of COPD presented initially with SBO, underwent surgery closed with 4 retention sutures, evicerated on 12/30 and closed with 6 more retention sutures, now c/b multifocal pneumonia, flu and respiratory distress /hypoxia on continuous bipap. Patient temporarily transferred to RCU, but developed hypercapnia and respiratory distress requiring intubation 1/6 and subsequent transfer to SICU. S/p bronch 1/9/18    #neuro  Awake, alert, follows commands, RASS 0  - Sedation with fentanyl and precedex, try to wean off as tolerated   - Attempt sedation holiday in AM daily    #resp  - Hx COPD and pulm HTN, current smoker, course c/b viral pneumonia, s/p bronch 1/9  - Intubated, on CPAP now, FIO2 60%, try to wean down, trial to wean down on FiO2 failed  - daily CXR AM for tube position checks  - ABG checks as appropriate  - C/w inhalers, pulmozyme, pulmonary toilet     #CV  - Vitals stable  - Continue amlodipine 10mg  - Resume PO metoprolol BID   - Monitor A-line site, changed over guidewire on 1/9, difficult Arterial access low axillary/brachial    #GI  - s/p ex-lap  for evisceration, retention sutures placed, daily wound check   - c/w protonix daily  - c/w TF 41 ml/hr, prosource added for protein    #renal  - BMP and I & O monitoring   - C/w iraheta for UOP monitoring   - repletion of electrolytes PRN  - Lasix 20mg BID    #heme  - stable H/H, s/p 1 prbc transfusion 1/8  - Subcutaneous heparin 500 u for dvt ppx    #ID  - afebrile, WBC counts stable   - f/u bronch cultures  - if febrile may obtain CT  - lines: iraheta, rt IJV, rt brachial A line, amaury, ET      #endo  - hx RA on chronic prednisone, DM  - Hydrocortisone q day  - regular insulin 10u increased to 14 u q6h while 24 hr continuous tube feeds continued, low dose corrective scale q4h    #dispo  -SICU      Critical Care Dx:  - Respiratory failure  - viral pneumonia  - CHRIS  - Morbid obesity  - Evisceration  - Severe protein/calorie malnutrition  - Pulmonary HTN  - COPD  - Adrenal insufficiency 63y Female hx of COPD presented initially with SBO, underwent surgery closed with 4 retention sutures, evicerated on 12/30 and closed with 6 more retention sutures, now c/b multifocal pneumonia, flu and respiratory distress /hypoxia on continuous bipap. Patient temporarily transferred to RCU, but developed hypercapnia and respiratory distress requiring intubation 1/6 and subsequent transfer to SICU. S/p bronch 1/9/18    #neuro  Awake, alert, follows commands, RASS 0  - Continue precedex  - d/c fentanyl  - Start po narcotic pain control prn  - Attempt sedation holiday in AM daily    #resp  Hx COPD and pulm HTN, current smoker, course c/b viral pneumonia, s/p bronch 1/9  - Intubated, on CPAP now, FIO2 60%, try to wean down  - daily CXR AM for tube position checks  - ABG checks as appropriate  - C/w inhalers, pulmozyme, pulmonary toilet     #CV  Vitals stable  - Continue amlodipine 10mg  - Metoprolol 25mg po BID   - Monitor A-line site, changed over guidewire on 1/9, difficult Arterial access low axillary/brachial    #GI  s/p ex-lap for evisceration, retention sutures placed,   - Daily wound check   - c/w protonix daily  - c/w TF 41 ml/hr, prosource added for protein    #renal  - BMP and I&O monitoring   - C/w iraheta for UOP monitoring   - Repletion of electrolytes PRN  - Lasix 40mg IV BID    #heme  - stable H/H, s/p 1 prbc transfusion 1/8  - SQH for dvt ppx  - b/l LE venous duplex to r/o DVT    #ID  Afebrile, WBC counts stable   - if febrile, will pan culture  - lines: iraheta, rt IJV, rt brachial A line, amaury, ET      #endo  - hx RA on chronic prednisone, DM  - Hydrocortisone q day  - NPH 14u q6h while 24 hr continuous tube feeds continued, moderate dose corrective scale q6h  - Endo following, appreciate recs  - Synthroid 125 microgram qhs    #dispo  -SICU      Critical Care Dx:  - Respiratory failure  - viral pneumonia  - CHRIS  - Morbid obesity  - Evisceration  - Severe protein/calorie malnutrition  - Pulmonary HTN  - COPD  - Adrenal insufficiency 63y Female hx of COPD presented initially with SBO, underwent surgery closed with 4 retention sutures, evicerated on 12/30 and closed with 6 more retention sutures, now c/b multifocal pneumonia, flu and respiratory distress /hypoxia on continuous bipap. Patient temporarily transferred to RCU, but developed hypercapnia and respiratory distress requiring intubation 1/6 and subsequent transfer to SICU. S/p bronch 1/9/18    #neuro  Awake, alert, follows commands, RASS 0  - Continue precedex  - d/c fentanyl  - Start po narcotic pain control prn  - Attempt sedation holiday in AM daily    #resp  Hx COPD and pulm HTN, current smoker, course c/b viral pneumonia, s/p bronch 1/9  - Intubated, on CPAP now, FIO2 40%, try to wean down  - daily CXR AM for tube position checks  - ABG checks as appropriate  - C/w inhalers, pulmozyme, pulmonary toilet     #CV  Vitals stable  - Continue amlodipine 10mg  - Metoprolol 25mg po BID   - Monitor A-line site, changed over guidewire on 1/9, difficult Arterial access low axillary/brachial    #GI  s/p ex-lap for evisceration, retention sutures placed,   - Daily wound check   - c/w protonix daily  - c/w TF 41 ml/hr, prosource added for protein    #renal  - BMP and I&O monitoring   - C/w iraheta for UOP monitoring   - Repletion of electrolytes PRN  - Lasix 40mg IV qd    #heme  - stable H/H, s/p 1 prbc transfusion 1/8  - SQH for dvt ppx  - b/l LE venous duplex to r/o DVT    #ID  Afebrile, WBC counts stable   - if febrile, will pan culture  - lines: iraheta, rt IJV, rt brachial A line, amaury, ET      #endo  - hx RA on chronic prednisone, DM  - Hydrocortisone q day  - NPH 14u q6h while 24 hr continuous tube feeds continued, moderate dose corrective scale q6h  - Endo following, appreciate recs  - Synthroid 125 microgram qhs    #dispo  -SICU      Critical Care Dx:  - Respiratory failure  - viral pneumonia  - CHRIS  - Morbid obesity  - Evisceration  - Severe protein/calorie malnutrition  - Pulmonary HTN  - COPD  - Adrenal insufficiency 63y Female hx of COPD presented initially with SBO, underwent surgery closed with 4 retention sutures, evicerated on 12/30 and closed with 6 more retention sutures, now c/b multifocal pneumonia, flu and respiratory distress /hypoxia on continuous bipap. Patient temporarily transferred to RCU, but developed hypercapnia and respiratory distress requiring intubation 1/6 and subsequent transfer to SICU. S/p bronch 1/9/18    #neuro  Awake, alert, follows commands, RASS 0  - Continue precedex  - d/c fentanyl  - Start po narcotic pain control prn  - Attempt sedation holiday in AM daily    #resp  Hx COPD and pulm HTN, current smoker, course c/b viral pneumonia, s/p bronch 1/9  - Intubated, on CPAP now, FIO2 40%, try to wean down  - daily CXR AM for tube position checks  - ABG checks as appropriate  - C/w inhalers, pulmozyme, pulmonary toilet     #CV  Vitals stable  - Continue amlodipine 10mg  - Metoprolol 25mg po BID   - Monitor A-line site, changed over guidewire on 1/9, difficult Arterial access low axillary/brachial    #GI  s/p ex-lap for evisceration, retention sutures placed,   - Daily wound check   - c/w protonix daily  - c/w TF 41 ml/hr, prosource added for protein    #renal  - BMP and I&O monitoring   - C/w iraheta for UOP monitoring   - Repletion of electrolytes PRN  - Lasix 40mg IV qd    #heme  - stable H/H, s/p 1 prbc transfusion 1/8  - SQH for dvt ppx  - b/l LE venous duplex to r/o DVT    #ID  Afebrile, WBC counts stable   - if febrile, will pan culture  - lines: iraheta, rt IJV, rt brachial A line, amaury, ET      #endo  - hx RA on chronic prednisone, DM  - Hydrocortisone q day  - NPH 14u q6h while 24 hr continuous tube feeds continued, moderate dose corrective scale q6h  - Endo following, appreciate recs  - Synthroid 125 microgram qhs    #dispo  -SICU 63y Female hx of COPD presented initially with SBO, underwent surgery closed with 4 retention sutures, evicerated on 12/30 and closed with 6 more retention sutures, now c/b multifocal pneumonia, flu and respiratory distress /hypoxia on continuous bipap. Patient temporarily transferred to RCU, but developed hypercapnia and respiratory distress requiring intubation 1/6 and subsequent transfer to SICU. S/p bronch 1/9/18    #neuro  Awake, alert, follows commands, RASS 0  - Continue precedex  - d/c fentanyl  - Start po narcotic pain control prn  - Attempt sedation holiday in AM daily    #resp  Hx COPD and pulm HTN, current smoker, course c/b viral pneumonia, s/p bronch 1/9  - Intubated, on CPAP now, FIO2 40%, try to wean down  - daily CXR AM for tube position checks  - ABG checks as appropriate  - C/w inhalers, pulmozyme, pulmonary toilet   -OOB to chair     #CV  Vitals stable  - Continue amlodipine 10mg  - Metoprolol 25mg po BID   - Monitor A-line site, changed over guidewire on 1/9, difficult Arterial access low axillary/brachial    #GI  s/p ex-lap for evisceration, retention sutures placed,   - Daily wound check   - c/w protonix daily  - c/w TF 41 ml/hr, 24 hrs    #renal  - BMP and I&O monitoring   - C/w iraheta for UOP monitoring   - Repletion of electrolytes PRN  - Lasix 40mg IV qd    #heme  - stable H/H, s/p 1 prbc transfusion 1/8  - SQH for dvt ppx  - b/l LE venous duplex to r/o DVT    #ID  Afebrile, WBC counts stable   - if febrile, will pan culture  - lines: iraheta, rt IJV, rt brachial A line, amaury, ET      #endo  - hx RA on chronic prednisone, DM  - Hydrocortisone q day, slow transition to home dose   - NPH 14u q6h while 24 hr continuous tube feeds continued, moderate dose corrective scale q6h  - Endo following, appreciate recs  - Synthroid 125 microgram qhs    #dispo  -SICU

## 2019-01-11 NOTE — PROGRESS NOTE ADULT - SUBJECTIVE AND OBJECTIVE BOX
Chief Complaint: DM2/steroids/hypothyroid    History: Patient remains on vent. She is in chair and eyes open. She is receiving Glucerna @ 41cc/hour x 24 hours.  Levothyroxine changed to po via NGT. Hydrocortisone to be tapered.    MEDICATIONS  (STANDING):  acetaminophen    Suspension .. 650 milliGRAM(s) Oral every 6 hours  ALBUTerol    90 MICROgram(s) HFA Inhaler 1 Puff(s) Inhalation every 6 hours  amLODIPine   Tablet 10 milliGRAM(s) Oral daily  buDESOnide 160 MICROgram(s)/formoterol 4.5 MICROgram(s) Inhaler 2 Puff(s) Inhalation two times a day  chlorhexidine 0.12% Liquid 15 milliLiter(s) Swish and Spit two times a day  chlorhexidine 4% Liquid 1 Application(s) Topical <User Schedule>  dexmedetomidine Infusion 0.3 MICROgram(s)/kG/Hr (8.415 mL/Hr) IV Continuous <Continuous>  dextrose 50% Injectable 25 milliLiter(s) IV Push once  fentaNYL   Infusion 0.5 MICROgram(s)/kG/Hr (5.61 mL/Hr) IV Continuous <Continuous>  furosemide   Injectable 40 milliGRAM(s) IV Push daily  gabapentin   Solution 300 milliGRAM(s) Oral two times a day  heparin  Injectable 5000 Unit(s) SubCutaneous every 8 hours  hydrocortisone sodium succinate Injectable 25 milliGRAM(s) IV Push every 12 hours  influenza   Vaccine 0.5 milliLiter(s) IntraMuscular once  insulin lispro (HumaLOG) corrective regimen sliding scale   SubCutaneous every 6 hours  insulin NPH human recombinant 14 Unit(s) SubCutaneous every 6 hours  levothyroxine 137 MICROGram(s) Oral daily  metoprolol tartrate 25 milliGRAM(s) Enteral Tube two times a day  pantoprazole   Suspension 40 milliGRAM(s) Oral daily  potassium chloride  20 mEq/100 mL IVPB 20 milliEquivalent(s) IV Intermittent every 2 hours  simvastatin 20 milliGRAM(s) Oral at bedtime  sodium chloride 3%  Inhalation 4 milliLiter(s) Inhalation every 6 hours    MEDICATIONS  (PRN):  HYDROmorphone   Tablet 1 milliGRAM(s) Oral every 4 hours PRN Severe Pain (7 - 10)      Allergies    No Known Allergies    Intolerances      Review of Systems:    UNABLE TO OBTAIN    PHYSICAL EXAM:  VITALS: T(C): 38.2 (01-11-19 @ 12:00)  T(F): 100.8 (01-11-19 @ 12:00), Max: 101 (01-11-19 @ 08:00)  HR: 92 (01-11-19 @ 12:00) (62 - 113)  BP: --  RR:  (17 - 31)  SpO2:  (92% - 100%)  Wt(kg): --  GENERAL: NAD, cushingoid appearance  EYES: No proptosis, no lid lag, anicteric  HEENT:  Atraumatic, Normocephalic, NGT in place  CARDIOVASCULAR: Regular rate and rhythm  PSYCH: eyes open      CAPILLARY BLOOD GLUCOSE      POCT Blood Glucose.: 132 mg/dL (11 Jan 2019 12:22)  POCT Blood Glucose.: 121 mg/dL (11 Jan 2019 05:41)  POCT Blood Glucose.: 207 mg/dL (10 Soy 2019 23:51)  POCT Blood Glucose.: 150 mg/dL (10 Osy 2019 17:52)      01-11    147<H>  |  100  |  20  ----------------------------<  181<H>  3.5   |  34<H>  |  0.81    EGFR if : 91  EGFR if non : 78    Ca    9.4      01-11  Mg     2.0     01-11  Phos  3.2     01-11            Thyroid Function Tests:  12-27 @ 07:15 TSH -- FreeT4 1.18 T3 -- Anti TPO -- Anti Thyroglobulin Ab -- TSI --  12-20 @ 06:45 TSH 40.15 FreeT4 0.98 T3 -- Anti TPO -- Anti Thyroglobulin Ab -- TSI --      Hemoglobin A1C, Whole Blood: 7.1 % <H> [4.0 - 5.6] (12-17-18 @ 03:50)  Hemoglobin A1C, Whole Blood: 7.3 % <H> [4.0 - 5.6] (12-16-18 @ 04:17)

## 2019-01-11 NOTE — PROGRESS NOTE ADULT - ATTENDING COMMENTS
Critical Care Dx    Acute Respiratory failure with chronic underlying COPD, hypoxic/hypercapneic   -P/F ratio improving   -Flu +, Tamiflu stopped after 5 days.   -Bilateral infiltrates, requiring elevated PEEP  -CPAP, goal to lower Fi02 < 60%   -trend ABG    Hyperglycemia  -Endocrine consult prefers NPH due to steroid timing. Increased to 14units yesterday q6. Will trend levels.   -Steroids decreased but still higher than baseline home requirement   -con't tube feeds    Multifocal pneumonia, +Flu  -Zosyn and tamiflu d/c  -Bronch and BAL done yesterday revealed no mucus or secretions/pus. BAL likely showing normal airway jackelyn  -trend CXR, ABG's   -slight fever this AM, will trend  -ordered bilateral lower ext duplex    Hypothyroidism  -PO dose of synthroid     RA, COPD  -stress dose steroids given baseline requirement of prednisone and now with surgical/inflammatory stress.  -wean off stress dose today and continue to taper slowly    At risk for malnutrition  -reaching goal tube feedings but has been underfed in post-surgical time period  -monitor prealbumin weekly     The patient is a critical care patient with life threatening respiratory instability in SICU.  I have personally interviewed and examined the patient, reviewed data and laboratory tests/x-rays and all pertinent electronic images.  The SICU team has a constant risk benefit analyzes discussion with the primary team, all consultants, House Staff and PA's on all decisions.  These diagnoses are unrelated to the surgical procedure noted.  Time involved in performance of separately billable procedures was not counted toward my critical care time. There is no overlap.    I spent 40 minutes in total providing critical care for the diagnoses, assessment and plan above.      I was physically present for the key portions of the evaluation and management (E/M) service provided.  I agree with the above history, physical, and plan which I have reviewed and edited where appropriate.     Ryan Bear MD  Acute and Critical Care Surgery .

## 2019-01-11 NOTE — PROGRESS NOTE ADULT - PROBLEM SELECTOR PLAN 2
Please change to levothyroxine 137 mcg oral daily as the dose was increased from 125 on this admission due to elevated TSH.  Tube feeds should be held one hour before and after levothyroxine administration.

## 2019-01-12 LAB
ANION GAP SERPL CALC-SCNC: 12 MEQ/L — SIGNIFICANT CHANGE UP (ref 7–14)
ANION GAP SERPL CALC-SCNC: 12 MEQ/L — SIGNIFICANT CHANGE UP (ref 7–14)
BASE EXCESS BLDA CALC-SCNC: 10.2 MMOL/L — SIGNIFICANT CHANGE UP
BASE EXCESS BLDA CALC-SCNC: 4.9 MMOL/L — SIGNIFICANT CHANGE UP
BASE EXCESS BLDA CALC-SCNC: 7.9 MMOL/L — SIGNIFICANT CHANGE UP
BASE EXCESS BLDA CALC-SCNC: 9.9 MMOL/L — SIGNIFICANT CHANGE UP
BASOPHILS # BLD AUTO: 0.02 K/UL — SIGNIFICANT CHANGE UP (ref 0–0.2)
BASOPHILS NFR BLD AUTO: 0.2 % — SIGNIFICANT CHANGE UP (ref 0–2)
BUN SERPL-MCNC: 21 MG/DL — SIGNIFICANT CHANGE UP (ref 7–23)
BUN SERPL-MCNC: 21 MG/DL — SIGNIFICANT CHANGE UP (ref 7–23)
CA-I BLDA-SCNC: 1.2 MMOL/L — SIGNIFICANT CHANGE UP (ref 1.15–1.29)
CA-I BLDA-SCNC: 1.21 MMOL/L — SIGNIFICANT CHANGE UP (ref 1.15–1.29)
CALCIUM SERPL-MCNC: 8.6 MG/DL — SIGNIFICANT CHANGE UP (ref 8.4–10.5)
CALCIUM SERPL-MCNC: 9.3 MG/DL — SIGNIFICANT CHANGE UP (ref 8.4–10.5)
CHLORIDE BLDA-SCNC: 108 MMOL/L — SIGNIFICANT CHANGE UP (ref 96–108)
CHLORIDE BLDA-SCNC: 109 MMOL/L — HIGH (ref 96–108)
CHLORIDE SERPL-SCNC: 101 MMOL/L — SIGNIFICANT CHANGE UP (ref 98–107)
CHLORIDE SERPL-SCNC: 103 MMOL/L — SIGNIFICANT CHANGE UP (ref 98–107)
CO2 SERPL-SCNC: 28 MMOL/L — SIGNIFICANT CHANGE UP (ref 22–31)
CO2 SERPL-SCNC: 33 MMOL/L — HIGH (ref 22–31)
CREAT SERPL-MCNC: 0.94 MG/DL — SIGNIFICANT CHANGE UP (ref 0.5–1.3)
CREAT SERPL-MCNC: 1.06 MG/DL — SIGNIFICANT CHANGE UP (ref 0.5–1.3)
EOSINOPHIL # BLD AUTO: 0.23 K/UL — SIGNIFICANT CHANGE UP (ref 0–0.5)
EOSINOPHIL NFR BLD AUTO: 2.4 % — SIGNIFICANT CHANGE UP (ref 0–6)
GLUCOSE BLDA-MCNC: 130 MG/DL — HIGH (ref 70–99)
GLUCOSE BLDA-MCNC: 132 MG/DL — HIGH (ref 70–99)
GLUCOSE BLDA-MCNC: 61 MG/DL — LOW (ref 70–99)
GLUCOSE BLDA-MCNC: 73 MG/DL — SIGNIFICANT CHANGE UP (ref 70–99)
GLUCOSE BLDC GLUCOMTR-MCNC: 123 MG/DL — HIGH (ref 70–99)
GLUCOSE BLDC GLUCOMTR-MCNC: 78 MG/DL — SIGNIFICANT CHANGE UP (ref 70–99)
GLUCOSE BLDC GLUCOMTR-MCNC: 94 MG/DL — SIGNIFICANT CHANGE UP (ref 70–99)
GLUCOSE SERPL-MCNC: 133 MG/DL — HIGH (ref 70–99)
GLUCOSE SERPL-MCNC: 146 MG/DL — HIGH (ref 70–99)
HCO3 BLDA-SCNC: 29 MMOL/L — HIGH (ref 22–26)
HCO3 BLDA-SCNC: 32 MMOL/L — HIGH (ref 22–26)
HCO3 BLDA-SCNC: 33 MMOL/L — HIGH (ref 22–26)
HCO3 BLDA-SCNC: 34 MMOL/L — HIGH (ref 22–26)
HCT VFR BLD CALC: 26.2 % — LOW (ref 34.5–45)
HCT VFR BLDA CALC: 25.1 % — LOW (ref 34.5–46.5)
HCT VFR BLDA CALC: 26.3 % — LOW (ref 34.5–46.5)
HCT VFR BLDA CALC: 26.5 % — LOW (ref 34.5–46.5)
HCT VFR BLDA CALC: 26.5 % — LOW (ref 34.5–46.5)
HGB BLD-MCNC: 8.1 G/DL — LOW (ref 11.5–15.5)
HGB BLDA-MCNC: 8.1 G/DL — LOW (ref 11.5–15.5)
HGB BLDA-MCNC: 8.5 G/DL — LOW (ref 11.5–15.5)
IMM GRANULOCYTES NFR BLD AUTO: 2.1 % — HIGH (ref 0–1.5)
LACTATE BLDA-SCNC: 1.2 MMOL/L — SIGNIFICANT CHANGE UP (ref 0.5–2)
LACTATE BLDA-SCNC: 1.3 MMOL/L — SIGNIFICANT CHANGE UP (ref 0.5–2)
LACTATE BLDA-SCNC: 1.7 MMOL/L — SIGNIFICANT CHANGE UP (ref 0.5–2)
LACTATE BLDA-SCNC: 2 MMOL/L — SIGNIFICANT CHANGE UP (ref 0.5–2)
LYMPHOCYTES # BLD AUTO: 1.08 K/UL — SIGNIFICANT CHANGE UP (ref 1–3.3)
LYMPHOCYTES # BLD AUTO: 11.1 % — LOW (ref 13–44)
MAGNESIUM SERPL-MCNC: 2.1 MG/DL — SIGNIFICANT CHANGE UP (ref 1.6–2.6)
MAGNESIUM SERPL-MCNC: 2.1 MG/DL — SIGNIFICANT CHANGE UP (ref 1.6–2.6)
MCHC RBC-ENTMCNC: 30.1 PG — SIGNIFICANT CHANGE UP (ref 27–34)
MCHC RBC-ENTMCNC: 30.9 % — LOW (ref 32–36)
MCV RBC AUTO: 97.4 FL — SIGNIFICANT CHANGE UP (ref 80–100)
METHOD TYPE: SIGNIFICANT CHANGE UP
MONOCYTES # BLD AUTO: 0.82 K/UL — SIGNIFICANT CHANGE UP (ref 0–0.9)
MONOCYTES NFR BLD AUTO: 8.4 % — SIGNIFICANT CHANGE UP (ref 2–14)
NEUTROPHILS # BLD AUTO: 7.38 K/UL — SIGNIFICANT CHANGE UP (ref 1.8–7.4)
NEUTROPHILS NFR BLD AUTO: 75.8 % — SIGNIFICANT CHANGE UP (ref 43–77)
NRBC # FLD: 0.1 K/UL — LOW (ref 25–125)
NRBC FLD-RTO: 1 — SIGNIFICANT CHANGE UP
ORGANISM # SPEC MICROSCOPIC CNT: SIGNIFICANT CHANGE UP
ORGANISM # SPEC MICROSCOPIC CNT: SIGNIFICANT CHANGE UP
PCO2 BLDA: 42 MMHG — SIGNIFICANT CHANGE UP (ref 32–48)
PCO2 BLDA: 45 MMHG — SIGNIFICANT CHANGE UP (ref 32–48)
PCO2 BLDA: 46 MMHG — SIGNIFICANT CHANGE UP (ref 32–48)
PCO2 BLDA: 50 MMHG — HIGH (ref 32–48)
PH BLDA: 7.45 PH — SIGNIFICANT CHANGE UP (ref 7.35–7.45)
PH BLDA: 7.45 PH — SIGNIFICANT CHANGE UP (ref 7.35–7.45)
PH BLDA: 7.47 PH — HIGH (ref 7.35–7.45)
PH BLDA: 7.48 PH — HIGH (ref 7.35–7.45)
PHOSPHATE SERPL-MCNC: 3.7 MG/DL — SIGNIFICANT CHANGE UP (ref 2.5–4.5)
PHOSPHATE SERPL-MCNC: 4.2 MG/DL — SIGNIFICANT CHANGE UP (ref 2.5–4.5)
PLATELET # BLD AUTO: 457 K/UL — HIGH (ref 150–400)
PMV BLD: 9.7 FL — SIGNIFICANT CHANGE UP (ref 7–13)
PO2 BLDA: 58 MMHG — LOW (ref 83–108)
PO2 BLDA: 63 MMHG — LOW (ref 83–108)
PO2 BLDA: 67 MMHG — LOW (ref 83–108)
PO2 BLDA: 68 MMHG — LOW (ref 83–108)
POTASSIUM BLDA-SCNC: 2.8 MMOL/L — CRITICAL LOW (ref 3.4–4.5)
POTASSIUM BLDA-SCNC: 3.4 MMOL/L — SIGNIFICANT CHANGE UP (ref 3.4–4.5)
POTASSIUM BLDA-SCNC: 3.4 MMOL/L — SIGNIFICANT CHANGE UP (ref 3.4–4.5)
POTASSIUM BLDA-SCNC: 4.1 MMOL/L — SIGNIFICANT CHANGE UP (ref 3.4–4.5)
POTASSIUM SERPL-MCNC: 3.1 MMOL/L — LOW (ref 3.5–5.3)
POTASSIUM SERPL-MCNC: 3.5 MMOL/L — SIGNIFICANT CHANGE UP (ref 3.5–5.3)
POTASSIUM SERPL-SCNC: 3.1 MMOL/L — LOW (ref 3.5–5.3)
POTASSIUM SERPL-SCNC: 3.5 MMOL/L — SIGNIFICANT CHANGE UP (ref 3.5–5.3)
RBC # BLD: 2.69 M/UL — LOW (ref 3.8–5.2)
RBC # FLD: 16.6 % — HIGH (ref 10.3–14.5)
SAO2 % BLDA: 86.6 % — LOW (ref 95–99)
SAO2 % BLDA: 90 % — LOW (ref 95–99)
SAO2 % BLDA: 91.2 % — LOW (ref 95–99)
SAO2 % BLDA: 92.7 % — LOW (ref 95–99)
SODIUM BLDA-SCNC: 140 MMOL/L — SIGNIFICANT CHANGE UP (ref 136–146)
SODIUM BLDA-SCNC: 144 MMOL/L — SIGNIFICANT CHANGE UP (ref 136–146)
SODIUM BLDA-SCNC: 145 MMOL/L — SIGNIFICANT CHANGE UP (ref 136–146)
SODIUM BLDA-SCNC: 145 MMOL/L — SIGNIFICANT CHANGE UP (ref 136–146)
SODIUM SERPL-SCNC: 143 MMOL/L — SIGNIFICANT CHANGE UP (ref 135–145)
SODIUM SERPL-SCNC: 146 MMOL/L — HIGH (ref 135–145)
SPECIMEN SOURCE: SIGNIFICANT CHANGE UP
SPECIMEN SOURCE: SIGNIFICANT CHANGE UP
WBC # BLD: 9.73 K/UL — SIGNIFICANT CHANGE UP (ref 3.8–10.5)
WBC # FLD AUTO: 9.73 K/UL — SIGNIFICANT CHANGE UP (ref 3.8–10.5)

## 2019-01-12 PROCEDURE — 74177 CT ABD & PELVIS W/CONTRAST: CPT | Mod: 26

## 2019-01-12 PROCEDURE — 99223 1ST HOSP IP/OBS HIGH 75: CPT | Mod: GC

## 2019-01-12 PROCEDURE — 71045 X-RAY EXAM CHEST 1 VIEW: CPT | Mod: 26

## 2019-01-12 PROCEDURE — 99291 CRITICAL CARE FIRST HOUR: CPT

## 2019-01-12 PROCEDURE — 99292 CRITICAL CARE ADDL 30 MIN: CPT

## 2019-01-12 RX ORDER — POTASSIUM CHLORIDE 20 MEQ
20 PACKET (EA) ORAL
Qty: 0 | Refills: 0 | Status: COMPLETED | OUTPATIENT
Start: 2019-01-12 | End: 2019-01-12

## 2019-01-12 RX ORDER — CEFEPIME 1 G/1
INJECTION, POWDER, FOR SOLUTION INTRAMUSCULAR; INTRAVENOUS
Qty: 0 | Refills: 0 | Status: DISCONTINUED | OUTPATIENT
Start: 2019-01-12 | End: 2019-01-12

## 2019-01-12 RX ORDER — VANCOMYCIN HCL 1 G
1000 VIAL (EA) INTRAVENOUS EVERY 12 HOURS
Qty: 0 | Refills: 0 | Status: DISCONTINUED | OUTPATIENT
Start: 2019-01-13 | End: 2019-01-14

## 2019-01-12 RX ORDER — CEFEPIME 1 G/1
2000 INJECTION, POWDER, FOR SOLUTION INTRAMUSCULAR; INTRAVENOUS ONCE
Qty: 0 | Refills: 0 | Status: DISCONTINUED | OUTPATIENT
Start: 2019-01-12 | End: 2019-01-12

## 2019-01-12 RX ORDER — PROPOFOL 10 MG/ML
10 INJECTION, EMULSION INTRAVENOUS
Qty: 1000 | Refills: 0 | Status: DISCONTINUED | OUTPATIENT
Start: 2019-01-12 | End: 2019-01-12

## 2019-01-12 RX ORDER — VANCOMYCIN HCL 1 G
VIAL (EA) INTRAVENOUS
Qty: 0 | Refills: 0 | Status: DISCONTINUED | OUTPATIENT
Start: 2019-01-12 | End: 2019-01-14

## 2019-01-12 RX ORDER — PIPERACILLIN AND TAZOBACTAM 4; .5 G/20ML; G/20ML
3.38 INJECTION, POWDER, LYOPHILIZED, FOR SOLUTION INTRAVENOUS EVERY 8 HOURS
Qty: 0 | Refills: 0 | Status: DISCONTINUED | OUTPATIENT
Start: 2019-01-12 | End: 2019-01-20

## 2019-01-12 RX ORDER — ETOMIDATE 2 MG/ML
12 INJECTION INTRAVENOUS ONCE
Qty: 0 | Refills: 0 | Status: COMPLETED | OUTPATIENT
Start: 2019-01-12 | End: 2019-01-12

## 2019-01-12 RX ORDER — POTASSIUM CHLORIDE 20 MEQ
40 PACKET (EA) ORAL ONCE
Qty: 0 | Refills: 0 | Status: COMPLETED | OUTPATIENT
Start: 2019-01-12 | End: 2019-01-12

## 2019-01-12 RX ORDER — OXYCODONE HYDROCHLORIDE 5 MG/1
10 TABLET ORAL EVERY 4 HOURS
Qty: 0 | Refills: 0 | Status: DISCONTINUED | OUTPATIENT
Start: 2019-01-12 | End: 2019-01-16

## 2019-01-12 RX ORDER — PIPERACILLIN AND TAZOBACTAM 4; .5 G/20ML; G/20ML
3.38 INJECTION, POWDER, LYOPHILIZED, FOR SOLUTION INTRAVENOUS ONCE
Qty: 0 | Refills: 0 | Status: COMPLETED | OUTPATIENT
Start: 2019-01-12 | End: 2019-01-12

## 2019-01-12 RX ORDER — VANCOMYCIN HCL 1 G
1000 VIAL (EA) INTRAVENOUS ONCE
Qty: 0 | Refills: 0 | Status: COMPLETED | OUTPATIENT
Start: 2019-01-12 | End: 2019-01-12

## 2019-01-12 RX ORDER — POTASSIUM CHLORIDE 20 MEQ
40 PACKET (EA) ORAL ONCE
Qty: 0 | Refills: 0 | Status: DISCONTINUED | OUTPATIENT
Start: 2019-01-12 | End: 2019-01-12

## 2019-01-12 RX ORDER — HYDROMORPHONE HYDROCHLORIDE 2 MG/ML
0.5 INJECTION INTRAMUSCULAR; INTRAVENOUS; SUBCUTANEOUS EVERY 4 HOURS
Qty: 0 | Refills: 0 | Status: DISCONTINUED | OUTPATIENT
Start: 2019-01-12 | End: 2019-01-15

## 2019-01-12 RX ORDER — SUCCINYLCHOLINE CHLORIDE 100 MG/5ML
80 SYRINGE (ML) INTRAVENOUS ONCE
Qty: 0 | Refills: 0 | Status: COMPLETED | OUTPATIENT
Start: 2019-01-12 | End: 2019-01-12

## 2019-01-12 RX ORDER — FENTANYL CITRATE 50 UG/ML
50 INJECTION INTRAVENOUS ONCE
Qty: 0 | Refills: 0 | Status: DISCONTINUED | OUTPATIENT
Start: 2019-01-12 | End: 2019-01-12

## 2019-01-12 RX ORDER — VANCOMYCIN HCL 1 G
1000 VIAL (EA) INTRAVENOUS EVERY 12 HOURS
Qty: 0 | Refills: 0 | Status: DISCONTINUED | OUTPATIENT
Start: 2019-01-12 | End: 2019-01-12

## 2019-01-12 RX ORDER — DEXMEDETOMIDINE HYDROCHLORIDE IN 0.9% SODIUM CHLORIDE 4 UG/ML
0.3 INJECTION INTRAVENOUS
Qty: 200 | Refills: 0 | Status: DISCONTINUED | OUTPATIENT
Start: 2019-01-12 | End: 2019-01-15

## 2019-01-12 RX ORDER — OXYCODONE HYDROCHLORIDE 5 MG/1
5 TABLET ORAL EVERY 4 HOURS
Qty: 0 | Refills: 0 | Status: DISCONTINUED | OUTPATIENT
Start: 2019-01-12 | End: 2019-01-16

## 2019-01-12 RX ADMIN — Medication 40 MILLIEQUIVALENT(S): at 22:49

## 2019-01-12 RX ADMIN — Medication 25 MILLIGRAM(S): at 18:26

## 2019-01-12 RX ADMIN — GABAPENTIN 300 MILLIGRAM(S): 400 CAPSULE ORAL at 18:26

## 2019-01-12 RX ADMIN — FENTANYL CITRATE 50 MICROGRAM(S): 50 INJECTION INTRAVENOUS at 05:40

## 2019-01-12 RX ADMIN — Medication 650 MILLIGRAM(S): at 19:06

## 2019-01-12 RX ADMIN — Medication 40 MILLIGRAM(S): at 06:40

## 2019-01-12 RX ADMIN — HYDROMORPHONE HYDROCHLORIDE 0.5 MILLIGRAM(S): 2 INJECTION INTRAMUSCULAR; INTRAVENOUS; SUBCUTANEOUS at 18:45

## 2019-01-12 RX ADMIN — Medication 40 MILLIEQUIVALENT(S): at 06:39

## 2019-01-12 RX ADMIN — HYDROMORPHONE HYDROCHLORIDE 0.5 MILLIGRAM(S): 2 INJECTION INTRAMUSCULAR; INTRAVENOUS; SUBCUTANEOUS at 19:00

## 2019-01-12 RX ADMIN — Medication 100 MILLIEQUIVALENT(S): at 22:49

## 2019-01-12 RX ADMIN — PROPOFOL 6.73 MICROGRAM(S)/KG/MIN: 10 INJECTION, EMULSION INTRAVENOUS at 06:00

## 2019-01-12 RX ADMIN — OXYCODONE HYDROCHLORIDE 10 MILLIGRAM(S): 5 TABLET ORAL at 20:53

## 2019-01-12 RX ADMIN — BUDESONIDE AND FORMOTEROL FUMARATE DIHYDRATE 2 PUFF(S): 160; 4.5 AEROSOL RESPIRATORY (INHALATION) at 22:26

## 2019-01-12 RX ADMIN — Medication 25 MILLIGRAM(S): at 18:31

## 2019-01-12 RX ADMIN — Medication 25 MILLIGRAM(S): at 06:40

## 2019-01-12 RX ADMIN — Medication 250 MILLIGRAM(S): at 15:13

## 2019-01-12 RX ADMIN — ALBUTEROL 1 PUFF(S): 90 AEROSOL, METERED ORAL at 17:10

## 2019-01-12 RX ADMIN — Medication 25 MILLIGRAM(S): at 06:49

## 2019-01-12 RX ADMIN — Medication 650 MILLIGRAM(S): at 18:26

## 2019-01-12 RX ADMIN — HUMAN INSULIN 14 UNIT(S): 100 INJECTION, SUSPENSION SUBCUTANEOUS at 06:03

## 2019-01-12 RX ADMIN — AMLODIPINE BESYLATE 10 MILLIGRAM(S): 2.5 TABLET ORAL at 06:39

## 2019-01-12 RX ADMIN — FENTANYL CITRATE 50 MICROGRAM(S): 50 INJECTION INTRAVENOUS at 07:49

## 2019-01-12 RX ADMIN — OXYCODONE HYDROCHLORIDE 10 MILLIGRAM(S): 5 TABLET ORAL at 01:35

## 2019-01-12 RX ADMIN — Medication 650 MILLIGRAM(S): at 05:55

## 2019-01-12 RX ADMIN — ACETAZOLAMIDE 250 MILLIGRAM(S): 250 TABLET ORAL at 06:39

## 2019-01-12 RX ADMIN — PIPERACILLIN AND TAZOBACTAM 200 GRAM(S): 4; .5 INJECTION, POWDER, LYOPHILIZED, FOR SOLUTION INTRAVENOUS at 18:27

## 2019-01-12 RX ADMIN — SODIUM CHLORIDE 4 MILLILITER(S): 9 INJECTION INTRAMUSCULAR; INTRAVENOUS; SUBCUTANEOUS at 22:35

## 2019-01-12 RX ADMIN — SODIUM CHLORIDE 4 MILLILITER(S): 9 INJECTION INTRAMUSCULAR; INTRAVENOUS; SUBCUTANEOUS at 04:21

## 2019-01-12 RX ADMIN — HEPARIN SODIUM 5000 UNIT(S): 5000 INJECTION INTRAVENOUS; SUBCUTANEOUS at 06:40

## 2019-01-12 RX ADMIN — SIMVASTATIN 20 MILLIGRAM(S): 20 TABLET, FILM COATED ORAL at 22:21

## 2019-01-12 RX ADMIN — ALBUTEROL 1 PUFF(S): 90 AEROSOL, METERED ORAL at 04:25

## 2019-01-12 RX ADMIN — SODIUM CHLORIDE 4 MILLILITER(S): 9 INJECTION INTRAMUSCULAR; INTRAVENOUS; SUBCUTANEOUS at 09:41

## 2019-01-12 RX ADMIN — CHLORHEXIDINE GLUCONATE 15 MILLILITER(S): 213 SOLUTION TOPICAL at 06:39

## 2019-01-12 RX ADMIN — HEPARIN SODIUM 5000 UNIT(S): 5000 INJECTION INTRAVENOUS; SUBCUTANEOUS at 14:48

## 2019-01-12 RX ADMIN — CHLORHEXIDINE GLUCONATE 15 MILLILITER(S): 213 SOLUTION TOPICAL at 18:26

## 2019-01-12 RX ADMIN — OXYCODONE HYDROCHLORIDE 10 MILLIGRAM(S): 5 TABLET ORAL at 01:05

## 2019-01-12 RX ADMIN — CHLORHEXIDINE GLUCONATE 1 APPLICATION(S): 213 SOLUTION TOPICAL at 12:14

## 2019-01-12 RX ADMIN — BUDESONIDE AND FORMOTEROL FUMARATE DIHYDRATE 2 PUFF(S): 160; 4.5 AEROSOL RESPIRATORY (INHALATION) at 09:39

## 2019-01-12 RX ADMIN — ACETAZOLAMIDE 250 MILLIGRAM(S): 250 TABLET ORAL at 18:26

## 2019-01-12 RX ADMIN — SODIUM CHLORIDE 4 MILLILITER(S): 9 INJECTION INTRAMUSCULAR; INTRAVENOUS; SUBCUTANEOUS at 17:12

## 2019-01-12 RX ADMIN — ETOMIDATE 12 MILLIGRAM(S): 2 INJECTION INTRAVENOUS at 05:20

## 2019-01-12 RX ADMIN — Medication 80 MILLIGRAM(S): at 05:49

## 2019-01-12 RX ADMIN — Medication 650 MILLIGRAM(S): at 06:41

## 2019-01-12 RX ADMIN — Medication 650 MILLIGRAM(S): at 12:22

## 2019-01-12 RX ADMIN — HEPARIN SODIUM 5000 UNIT(S): 5000 INJECTION INTRAVENOUS; SUBCUTANEOUS at 22:21

## 2019-01-12 RX ADMIN — OXYCODONE HYDROCHLORIDE 10 MILLIGRAM(S): 5 TABLET ORAL at 20:23

## 2019-01-12 RX ADMIN — GABAPENTIN 300 MILLIGRAM(S): 400 CAPSULE ORAL at 06:40

## 2019-01-12 RX ADMIN — ALBUTEROL 1 PUFF(S): 90 AEROSOL, METERED ORAL at 22:26

## 2019-01-12 RX ADMIN — Medication 137 MICROGRAM(S): at 06:02

## 2019-01-12 RX ADMIN — ALBUTEROL 1 PUFF(S): 90 AEROSOL, METERED ORAL at 09:37

## 2019-01-12 RX ADMIN — Medication 650 MILLIGRAM(S): at 00:07

## 2019-01-12 RX ADMIN — Medication 650 MILLIGRAM(S): at 12:52

## 2019-01-12 RX ADMIN — PANTOPRAZOLE SODIUM 40 MILLIGRAM(S): 20 TABLET, DELAYED RELEASE ORAL at 12:23

## 2019-01-12 NOTE — PROGRESS NOTE ADULT - ATTENDING COMMENTS
Critical Care Dx    Acute Respiratory failure with chronic underlying COPD, hypoxic/hypercapneic  -self extubation noted this AM, reintubated   -P/F ratio improving   -Flu +, Tamiflu stopped after 5 days.   -CPAP trial, VCV overnight, goal to lower Fi02 < 60%   -trend ABG    Hyperglycemia  -Endocrine consult prefers NPH due to steroid timing.  Will trend levels, keep <180  -Steroids decreased but still higher than baseline home requirement   -con't tube feeds    Febrile, unclear source  -suspect either PNA or viral etiology.   -CT scan w/ PO and IV contrast this AM to rule out intra-abdominal source  -slight fever this AM, will trend, fever workup.  -Central line will be changed if all else negative  -ordered bilateral lower ext duplex    Hypothyroidism  -PO dose of synthroid     RA, COPD  -stress dose steroids given baseline requirement of prednisone and now with surgical/inflammatory stress.  -wean off stress dose today and continue to taper slowly    At risk for malnutrition  -reaching goal tube feedings but has been underfed in post-surgical time period  -monitor prealbumin weekly     The patient is a critical care patient with life threatening respiratory instability in SICU.  I have personally interviewed and examined the patient, reviewed data and laboratory tests/x-rays and all pertinent electronic images.  The SICU team has a constant risk benefit analyzes discussion with the primary team, all consultants, House Staff and PA's on all decisions.  These diagnoses are unrelated to the surgical procedure noted.  Time involved in performance of separately billable procedures was not counted toward my critical care time. There is no overlap.    I spent 45 minutes in total providing critical care for the diagnoses, assessment and plan above.      I was physically present for the key portions of the evaluation and management (E/M) service provided.  I agree with the above history, physical, and plan which I have reviewed and edited where appropriate.     Ryan Bear MD  Acute and Critical Care Surgery .

## 2019-01-12 NOTE — PROGRESS NOTE ADULT - ASSESSMENT
63F w/ multiple respiratory comorbidities p/w SBO s/p ex-lap with findings of healthy bowel c/b evisceration of bowel s/p ex-lap with placement of retention sutures, now with worsening separation at skin edges requiring SICU level care including intubation with FiO2 of 50% and PEEP of 10    Plan  - Pain control: dilaudid 0.5mg IV push q4h PRN, oxy 5&10 via nasogatric tube q4h PRN, Tylenol 650mg suspension PO q6h, gabapentin 300mg via NGT  - NPO w/ tube feeds  - Monitor GI fxn   - Monitor respiratory status  - Follow long term care plan - will likely need tracheostomy  - DVT ppx: subQ heparin  - Follow care plan per SICU team 63F w/ multiple respiratory comorbidities p/w SBO s/p ex-lap with findings of healthy bowel c/b evisceration of bowel s/p ex-lap with placement of retention sutures, now with worsening separation at skin edges requiring SICU level care including intubation with FiO2 of 50% and PEEP of 10    Plan  - Pain control: dilaudid 0.5mg IV push q4h PRN, oxy 5&10 via nasogatric tube q4h PRN, Tylenol 650mg suspension PO q6h, gabapentin 300mg via NGT  - NPO w/ tube feeds  - Monitor GI fxn   - Monitor respiratory status  - Follow long term care plan - will likely need tracheostomy  - DVT ppx: subQ heparin  - Appreciate SICU care

## 2019-01-12 NOTE — AIRWAY PLACEMENT NOTE ADULT - POST AIRWAY PLACEMENT ASSESSMENT:
breath sounds equal/CXR pending/chest excursion noted/breath sounds bilateral/positive end tidal CO2 noted

## 2019-01-12 NOTE — CONSULT NOTE ADULT - ASSESSMENT
63 year old female with RA immunosuppressed from chronic steroids, underlying DM, presented on 12/15 with SBO.  S/p laparotomy and subsequent revision, now with respiratory failure and sepsis due to an enterococcal bacteremia.  Course/Infection complicated by underlying DM/ immuno suppression. High risk for complications.    1) Enterococcal bacteremia: Enterococcus in the blood is most often form a , abdominal, wound or line infection.   the patient has risk for all of the above.    Recent C ta/p without abscess but concern for midline wound infection  -Consider wound swab of drainage from wound  -Check a urine Culture  -Line change when feasible (line odes not appear infected, pt without other access at this time)    -Repeat blood culture in 48 hours  -Check echo  -Continue vancomycin (check through before 4th dose)    2) Abdominal Wound: retention sutures in place  Some drainage from lower pole  No abscess on CT    Wound Care  Continue vanco  Favor gram negative/anerobe coverage as well- Consider resuming Zosyn    3) Immunosuppressed: due ot steroid use  Impairs wound healing    4) DM: glycemic control

## 2019-01-12 NOTE — PROGRESS NOTE ADULT - ASSESSMENT
63y Female hx of COPD presented initially with SBO, underwent surgery closed with 4 retention sutures, evicerated on 12/30 and closed with 6 more retention sutures, now c/b multifocal pneumonia, flu and respiratory distress /hypoxia on continuous bipap. Patient temporarily transferred to RCU, but developed hypercapnia and respiratory distress requiring intubation 1/6 and subsequent transfer to SICU. S/p bronch 1/9/18    #neuro  Awake, alert, follows commands, RASS 0  - Continue precedex  - Pain control PRN   - Attempt sedation holiday in AM daily    #resp  Hx COPD and pulm HTN, current smoker, course c/b viral pneumonia, s/p bronch 1/9  - Intubated, on CPAP now, FIO2 40%, try to wean down  - daily CXR AM for tube position checks  - ABG checks as appropriate  - C/w inhalers, pulmozyme, pulmonary toilet   - OOB to chair     #CV  Vitals stable  - Continue amlodipine 10mg  - Metoprolol 25mg po BID   - Monitor A-line site, changed over guidewire on 1/9, difficult Arterial access low axillary/brachial    #GI  s/p ex-lap for evisceration, retention sutures placed  - Daily wound check   - c/w protonix daily  - c/w TF 41 ml/hr, 24 hrs    #renal  - BMP and I&O monitoring   - C/w iraheta for UOP monitoring   - Repletion of electrolytes PRN  - Lasix 40mg IV qd  - Diamox 250mg BID for metabolic alkalosis     #heme  - stable H/H, s/p 1 prbc transfusion 1/8  - SQH for dvt ppx  - b/l LE venous duplex to r/o DVT    #ID  Afebrile, WBC counts stable   - fever workup pending   - lines: iraheta, rt IJV, rt brachial A line, amaury ET      #endo  - hx RA on chronic prednisone, DM  - Hydrocortisone q day, slow transition to home dose   - NPH 14u q6h while 24 hr continuous tube feeds continued, moderate dose corrective scale q6h  - Endo following, appreciate recs  - Synthroid 137mcg qhs    #dispo  -SICU 63y Female hx of COPD presented initially with SBO, underwent surgery closed with 4 retention sutures, evicerated on 12/30 and closed with 6 more retention sutures, now c/b multifocal pneumonia, flu and respiratory distress /hypoxia on continuous bipap. Patient temporarily transferred to RCU, but developed hypercapnia and respiratory distress requiring intubation 1/6 and subsequent transfer to SICU. S/p bronch 1/9/18    #neuro  Awake, alert, follows commands, RASS 0  - Continue precedex  - Pain control PRN   - Attempt sedation holiday in AM daily    #resp  Hx COPD and pulm HTN, current smoker, course c/b viral pneumonia, s/p bronch 1/9  - Intubated, on CPAP now, FIO2 50%, try to wean down  - daily CXR AM for tube position checks  - ABG checks as appropriate  - C/w inhalers, pulmozyme, pulmonary toilet   - OOB to chair     #CV  Vitals stable  - Continue amlodipine 10mg  - Metoprolol 25mg po BID   - Monitor A-line site, changed over guidewire on 1/9, difficult Arterial access low axillary/brachial    #GI  s/p ex-lap for evisceration, retention sutures placed  - Daily wound check   - c/w protonix daily  - c/w TF 41 ml/hr, 24 hrs    #renal  - BMP and I&O monitoring   - C/w iraheta for UOP monitoring   - Repletion of electrolytes PRN  - Lasix 40mg IV qd  - Diamox 250mg BID for metabolic alkalosis     #heme  - stable H/H, s/p 1 prbc transfusion 1/8  - SQH for dvt ppx  - b/l LE venous duplex to r/o DVT    #ID  Afebrile, WBC counts stable   - fever workup pending   - lines: iraheta, rt IJV, rt brachial A line, amaury ET      #endo  - hx RA on chronic prednisone, DM  - Hydrocortisone q day, slow transition to home dose   - NPH 14u q6h while 24 hr continuous tube feeds continued, moderate dose corrective scale q6h  - Endo following, appreciate recs  - Synthroid 137mcg qhs    #dispo  -SICU 63y Female hx of COPD presented initially with SBO, underwent surgery closed with 4 retention sutures, evicerated on 12/30 and closed with 6 more retention sutures, now c/b multifocal pneumonia, flu and respiratory distress /hypoxia on continuous bipap. Patient temporarily transferred to RCU, but developed hypercapnia and respiratory distress requiring intubation 1/6 and subsequent transfer to SICU. S/p bronch 1/9/18    #neuro  Awake, alert, follows commands, RASS 0  - Continue precedex  - Pain control PRN   - Attempt sedation holiday in AM daily    #resp  Hx COPD and pulm HTN, current smoker, course c/b viral pneumonia, s/p bronch 1/9  - Intubated, on CPAP now, FIO2 50%, try to wean down  - daily CXR AM for tube position checks  - ABG checks as appropriate  - C/w inhalers, pulmozyme, pulmonary toilet   - OOB to chair     #CV  Vitals stable  - Continue amlodipine 10mg  - Metoprolol 25mg po BID   - Monitor A-line site, changed over guidewire on 1/9, difficult Arterial access low axillary/brachial    #GI  s/p ex-lap for evisceration, retention sutures placed  - Daily wound check   - c/w protonix daily  - c/w TF 41 ml/hr, 24 hrs    #renal  - BMP and I&O monitoring   - C/w iraheta for UOP monitoring   - Repletion of electrolytes PRN  - Lasix 40mg IV qd  - Diamox 250mg BID for metabolic alkalosis     #heme  - stable H/H, s/p 1 prbc transfusion 1/8  - SQH for dvt ppx  - b/l LE venous duplex to r/o DVT    #ID  Afebrile, WBC counts stable   - fever workup pending   - CT abd/pelvis to evaluate for intraabdominal source of fever  - lines: iraheta, rt IJV, rt brachial A line, amaury, ET      #endo  - hx RA on chronic prednisone, DM  - Hydrocortisone q day, slow transition to home dose   - NPH 14u q6h while 24 hr continuous tube feeds continued, moderate dose corrective scale q6h  - Endo following, appreciate recs  - Synthroid 137mcg qhs    #dispo  -SICU

## 2019-01-12 NOTE — PROGRESS NOTE ADULT - SUBJECTIVE AND OBJECTIVE BOX
SICU Daily Progress Note  =====================================================  Interval/Overnight Events: No acute events overnight. Patient tolerating pressure support. Received diamox for metabolic acidosis. Fever workup in progress for Tmax 101F.     63y Female hx of COPD presented initially with SBO, underwent surgery closed with 4 retention sutures, eviscerated on 12/30 and closed with 6 more retention sutures, now c/b multifocal pneumonia, flu and respiratory distress /hypoxia on continuous bipap. Patient temporarily transferred to RCU, but developed hypercapnia and respiratory distress requiring intubation 1/6 and subsequent transfer to SICU.     Allergies: No Known Allergies    MEDICATIONS:   --------------------------------------------------------------------------------------  Neurologic Medications  acetaminophen    Suspension .. 650 milliGRAM(s) Oral every 6 hours  dexmedetomidine Infusion 0.3 MICROgram(s)/kG/Hr IV Continuous <Continuous>  gabapentin   Solution 300 milliGRAM(s) Oral two times a day  HYDROmorphone   Tablet 1 milliGRAM(s) Oral every 4 hours PRN Severe Pain (7 - 10)    Respiratory Medications  ALBUTerol    90 MICROgram(s) HFA Inhaler 1 Puff(s) Inhalation every 6 hours  buDESOnide 160 MICROgram(s)/formoterol 4.5 MICROgram(s) Inhaler 2 Puff(s) Inhalation two times a day  sodium chloride 3%  Inhalation 4 milliLiter(s) Inhalation every 6 hours    Cardiovascular Medications  acetazolamide Injectable 250 milliGRAM(s) IV Push two times a day  amLODIPine   Tablet 10 milliGRAM(s) Oral daily  furosemide   Injectable 40 milliGRAM(s) IV Push daily  metoprolol tartrate 25 milliGRAM(s) Enteral Tube two times a day    Gastrointestinal Medications  pantoprazole   Suspension 40 milliGRAM(s) Oral daily    Genitourinary Medications    Hematologic/Oncologic Medications  heparin  Injectable 5000 Unit(s) SubCutaneous every 8 hours  influenza   Vaccine 0.5 milliLiter(s) IntraMuscular once    Antimicrobial/Immunologic Medications    Endocrine/Metabolic Medications  dextrose 50% Injectable 25 milliLiter(s) IV Push once  hydrocortisone sodium succinate Injectable 25 milliGRAM(s) IV Push every 12 hours  insulin lispro (HumaLOG) corrective regimen sliding scale   SubCutaneous every 6 hours  insulin NPH human recombinant 14 Unit(s) SubCutaneous every 6 hours  levothyroxine 137 MICROGram(s) Oral daily  simvastatin 20 milliGRAM(s) Oral at bedtime    Topical/Other Medications  chlorhexidine 0.12% Liquid 15 milliLiter(s) Swish and Spit two times a day  chlorhexidine 4% Liquid 1 Application(s) Topical <User Schedule>    --------------------------------------------------------------------------------------    VITAL SIGNS, INS/OUTS (last 24 hours):  --------------------------------------------------------------------------------------  ((Insert SICU Vitals/Is+Os here))***  --------------------------------------------------------------------------------------    EXAM  NEUROLOGY  RASS: 0 to -1  Exam: Follows commands, eyes spontaneously open    HEENT  Exam: Normocephalic, atraumatic, EOMI.      RESPIRATORY  Exam: Lungs clear to auscultation, Normal expansion/effort.  Mechanical Ventilation: Mode: CPAP with PS, FiO2: 60, PEEP: 10, PS: 5, MAP: 12.2    CARDIOVASCULAR  Exam: S1, S2.  Regular rate and rhythm.    GI/NUTRITION  Exam: Abdomen with retention sutures in place, soft, ND  Current Diet:  Glucerna @ goal 41ml/hr x24hrs    VASCULAR  Exam: Extremities warm, pink, well-perfused.    MUSCULOSKELETAL  Exam: All extremities moving spontaneously without limitations.    SKIN  Exam: Good skin turgor, no skin breakdown.     METABOLIC/FLUIDS/ELECTROLYTES      HEMATOLOGIC  [x] VTE Prophylaxis: heparin  Injectable 5000 Unit(s) SubCutaneous every 8 hours    Transfusions:	[] PRBC	[] Platelets		[] FFP	[] Cryoprecipitate    INFECTIOUS DISEASE  Antimicrobials/Immunologic Medications:  influenza   Vaccine 0.5 milliLiter(s) IntraMuscular once    Tubes/Lines/Drains  ***  [x] Peripheral IV  [] Central Venous Line     	[] R	[] L	[] IJ	[] Fem	[] SC	Date Placed:   [] Arterial Line		[] R	[] L	[] Fem	[] Rad	[] Ax	Date Placed:   [] PICC		[] Midline		[] Mediport  [] Urinary Catheter		Date Placed:   [x] Necessity of urinary, arterial, and venous catheters discussed    LABS  --------------------------------------------------------------------------------------  ((Insert SICU Labs here))***  --------------------------------------------------------------------------------------    OTHER LABORATORY:     IMAGING STUDIES:   CXR: SICU Daily Progress Note  =====================================================  Interval/Overnight Events: No acute events overnight. Patient tolerating pressure support. Received diamox for metabolic acidosis. Fever workup in progress for Tmax 101F. Pt self-extubated overnight. Tube was replaced after patient's consent was obtained. CXR showed the tube was deep and it was pulled back 2cm.      63y Female hx of COPD presented initially with SBO, underwent surgery closed with 4 retention sutures, eviscerated on 12/30 and closed with 6 more retention sutures, now c/b multifocal pneumonia, flu and respiratory distress /hypoxia on continuous bipap. Patient temporarily transferred to RCU, but developed hypercapnia and respiratory distress requiring intubation 1/6 and subsequent transfer to SICU.     Allergies: No Known Allergies    MEDICATIONS:   --------------------------------------------------------------------------------------  Neurologic Medications  acetaminophen    Suspension .. 650 milliGRAM(s) Oral every 6 hours  dexmedetomidine Infusion 0.3 MICROgram(s)/kG/Hr IV Continuous <Continuous>  gabapentin   Solution 300 milliGRAM(s) Oral two times a day  HYDROmorphone   Tablet 1 milliGRAM(s) Oral every 4 hours PRN Severe Pain (7 - 10)    Respiratory Medications  ALBUTerol    90 MICROgram(s) HFA Inhaler 1 Puff(s) Inhalation every 6 hours  buDESOnide 160 MICROgram(s)/formoterol 4.5 MICROgram(s) Inhaler 2 Puff(s) Inhalation two times a day  sodium chloride 3%  Inhalation 4 milliLiter(s) Inhalation every 6 hours    Cardiovascular Medications  acetazolamide Injectable 250 milliGRAM(s) IV Push two times a day  amLODIPine   Tablet 10 milliGRAM(s) Oral daily  furosemide   Injectable 40 milliGRAM(s) IV Push daily  metoprolol tartrate 25 milliGRAM(s) Enteral Tube two times a day    Gastrointestinal Medications  pantoprazole   Suspension 40 milliGRAM(s) Oral daily    Genitourinary Medications    Hematologic/Oncologic Medications  heparin  Injectable 5000 Unit(s) SubCutaneous every 8 hours  influenza   Vaccine 0.5 milliLiter(s) IntraMuscular once    Antimicrobial/Immunologic Medications    Endocrine/Metabolic Medications  dextrose 50% Injectable 25 milliLiter(s) IV Push once  hydrocortisone sodium succinate Injectable 25 milliGRAM(s) IV Push every 12 hours  insulin lispro (HumaLOG) corrective regimen sliding scale   SubCutaneous every 6 hours  insulin NPH human recombinant 14 Unit(s) SubCutaneous every 6 hours  levothyroxine 137 MICROGram(s) Oral daily  simvastatin 20 milliGRAM(s) Oral at bedtime    Topical/Other Medications  chlorhexidine 0.12% Liquid 15 milliLiter(s) Swish and Spit two times a day  chlorhexidine 4% Liquid 1 Application(s) Topical <User Schedule>    --------------------------------------------------------------------------------------    VITAL SIGNS, INS/OUTS (last 24 hours):  --------------------------------------------------------------------------------------  ((Insert SICU Vitals/Is+Os here))***  --------------------------------------------------------------------------------------    EXAM  NEUROLOGY  RASS: 0 to -1  Exam: Follows commands, eyes spontaneously open    HEENT  Exam: Normocephalic, atraumatic, EOMI.      RESPIRATORY  Exam: Lungs clear to auscultation, Normal expansion/effort.  Mechanical Ventilation: Mode: CPAP with PS, FiO2: 60, PEEP: 10, PS: 5, MAP: 12.2    CARDIOVASCULAR  Exam: S1, S2.  Regular rate and rhythm.    GI/NUTRITION  Exam: Abdomen with retention sutures in place, soft, ND  Current Diet:  Glucerna @ goal 41ml/hr x24hrs    VASCULAR  Exam: Extremities warm, pink, well-perfused.    MUSCULOSKELETAL  Exam: All extremities moving spontaneously without limitations.    SKIN  Exam: Good skin turgor, no skin breakdown.     METABOLIC/FLUIDS/ELECTROLYTES      HEMATOLOGIC  [x] VTE Prophylaxis: heparin  Injectable 5000 Unit(s) SubCutaneous every 8 hours    Transfusions:	[] PRBC	[] Platelets		[] FFP	[] Cryoprecipitate    INFECTIOUS DISEASE  Antimicrobials/Immunologic Medications:  influenza   Vaccine 0.5 milliLiter(s) IntraMuscular once    Tubes/Lines/Drains  ***  [x] Peripheral IV  [] Central Venous Line     	[] R	[] L	[] IJ	[] Fem	[] SC	Date Placed:   [] Arterial Line		[] R	[] L	[] Fem	[] Rad	[] Ax	Date Placed:   [] PICC		[] Midline		[] Mediport  [] Urinary Catheter		Date Placed:   [x] Necessity of urinary, arterial, and venous catheters discussed    LABS  --------------------------------------------------------------------------------------  ((Insert SICU Labs here))***  --------------------------------------------------------------------------------------    OTHER LABORATORY:     IMAGING STUDIES:   CXR:

## 2019-01-12 NOTE — CONSULT NOTE ADULT - SUBJECTIVE AND OBJECTIVE BOX
HPI:  63 year old woman with COPD on home O2 (3L), moderate pulmonary HTN, DM, RA on prednisone (10 mg daily x 30 years), HTN, s/p CVA (no residual deficit), s/p  x2 presents with abdominal pain for one day on 12/15.  Pain was severe and associated with emesis.      CT scan shows SBO with transition point in the lower abdomen with associated interloop fluid and mesenteric edema.     S/p ex lap and SHANNON on .    Course complicated by evisceration returned ot OR on .    Has been having fever since .  Tx from pna from  through 1/10    Diagnosed with influenza on 1/3- tx with course of tamiflu.    Blood culture repeated on 1/10 - now growing Enterococcus    Patient is intubated.  Off pressors. Off sedation      PAST MEDICAL & SURGICAL HISTORY:  Diverticulosis  CVA (Cerebral Vascular Accident)  RA (Rheumatoid Arthritis)  Tooth Abscess  Arthritis  Cigarette Smoker  Chronic Asthma  Adult Hypothyroidism  Borderline Diabetes Mellitus  High Cholesterol  H/O: HTN  Wrist Disorder: S/P Surgery  History of  Section      Allergies    No Known Allergies    Intolerances        ANTIMICROBIALS:  piperacillin/tazobactam IVPB. 3.375 once  piperacillin/tazobactam IVPB. 3.375 every 8 hours  vancomycin  IVPB        OTHER MEDS:  acetaminophen    Suspension .. 650 milliGRAM(s) Oral every 6 hours  acetazolamide Injectable 250 milliGRAM(s) IV Push two times a day  ALBUTerol    90 MICROgram(s) HFA Inhaler 1 Puff(s) Inhalation every 6 hours  amLODIPine   Tablet 10 milliGRAM(s) Oral daily  buDESOnide 160 MICROgram(s)/formoterol 4.5 MICROgram(s) Inhaler 2 Puff(s) Inhalation two times a day  chlorhexidine 0.12% Liquid 15 milliLiter(s) Swish and Spit two times a day  chlorhexidine 4% Liquid 1 Application(s) Topical <User Schedule>  dexmedetomidine Infusion 0.3 MICROgram(s)/kG/Hr IV Continuous <Continuous>  dextrose 50% Injectable 25 milliLiter(s) IV Push once  furosemide   Injectable 40 milliGRAM(s) IV Push daily  gabapentin   Solution 300 milliGRAM(s) Oral two times a day  heparin  Injectable 5000 Unit(s) SubCutaneous every 8 hours  hydrocortisone sodium succinate Injectable 25 milliGRAM(s) IV Push every 12 hours  HYDROmorphone  Injectable 0.5 milliGRAM(s) IV Push every 4 hours PRN  influenza   Vaccine 0.5 milliLiter(s) IntraMuscular once  insulin lispro (HumaLOG) corrective regimen sliding scale   SubCutaneous every 6 hours  insulin NPH human recombinant 14 Unit(s) SubCutaneous every 6 hours  levothyroxine 137 MICROGram(s) Oral daily  metoprolol tartrate 25 milliGRAM(s) Enteral Tube two times a day  oxyCODONE    Solution 10 milliGRAM(s) Enteral Tube every 4 hours PRN  oxyCODONE    Solution 5 milliGRAM(s) Enteral Tube every 4 hours PRN  pantoprazole   Suspension 40 milliGRAM(s) Oral daily  simvastatin 20 milliGRAM(s) Oral at bedtime  sodium chloride 3%  Inhalation 4 milliLiter(s) Inhalation every 6 hours      SOCIAL HISTORY:  Pt is intubated/ critically illand unable to provide social hisotry    FAMILY HISTORY:  No pertinent family history in first degree relatives  patient is intubated/ critically ill and unable to provide family history    REVIEW OF SYSTEMS  [   ] ROS unobtainable because pt is intubated /critically ill   [  ] All other systems negative except as noted below:	    Constitutional:  [ ] fever [ ] chills  [ ] weight loss  [ ] weakness  Skin:  [ ] rash [ ] phlebitis	  Eyes: [ ] icterus [ ] pain  [ ] discharge	  ENMT: [ ] sore throat  [ ] thrush [ ] ulcers [ ] exudates  Respiratory: [ ] dyspnea [ ] hemoptysis [ ] cough [ ] sputum	  Cardiovascular:  [ ] chest pain [ ] palpitations [ ] edema	  Gastrointestinal:  [ ] nausea [ ] vomiting [ ] diarrhea [ ] constipation [ ] pain	  Genitourinary:  [ ] dysuria [ ] frequency [ ] hematuria [ ] discharge [ ] flank pain  [ ] incontinence  Musculoskeletal:  [ ] myalgias [ ] arthralgias [ ] arthritis  [ ] back pain  Neurological:  [ ] headache [ ] seizures  [ ] confusion/altered mental status  Psychiatric:  [ ] anxiety [ ] depression	  Hematology/Lymphatics:  [ ] lymphadenopathy  Endocrine:  [ ] adrenal [ ] thyroid  Allergic/Immunologic:	 [ ] transplant [ ] seasonal    PHYSICAL EXAM:  General: x[x ] intubated  HEAD/EYES: [ ] PERRL [ x] white sclera [ ] icterus  ENT:  [ ] normal [ x] supple [ ] thrush [ ] pharyngeal exudate  Cardiovascular:   [ ] murmur [ x] normal [ ] PPM/AICD  Respiratory:  [x clear to ausculation bilaterally  GI:  [x ] soft, retetntion sutures  discahrge from lower pole of wound  :  [ x] iraheta [ ] no CVA tenderness   Musculoskeletal:  [ x] no synovitis  Neurologic:  [x ] non-responsive  Skin:  [x ] no rash  Lymph: [ x] no lymphadenopathy  Psychiatric:  [ ] appropriate affect [ ] alert & oriented  Lines:  [x ] no phlebitis [ x] Right IJ- cdi  Right axillary a gerardo cdi            Drug Dosing Weight  Height (cm): 154.94 (2019 19:00)  Weight (kg): 112.2 (2019 19:00)  BMI (kg/m2): 46.7 (2019 19:00)  BSA (m2): 2.07 (2019 19:00)    Vital Signs Last 24 Hrs  T(F): 100.6 (19 @ 12:00), Max: 101.8 (19 @ 10:00)    Vital Signs Last 24 Hrs  HR: 66 (19 @ 15:51) (61 - 101)  BP: --  RR: 22 (19 @ 14:00)  SpO2: 95% (19 @ 15:51) (87% - 98%)  Wt(kg): --                          8.1    9.73  )-----------( 457      ( 2019 02:10 )             26.2           146<H>  |  101  |  21  ----------------------------<  133<H>  3.5   |  33<H>  |  0.94    Ca    9.3      2019 02:10  Phos  3.7       Mg     2.1             Urinalysis Basic - ( 2019 12:45 )    Color: YELLOW / Appearance: CLEAR / S.021 / pH: 7.5  Gluc: NEGATIVE / Ketone: NEGATIVE  / Bili: NEGATIVE / Urobili: LARGE   Blood: TRACE / Protein: 100 / Nitrite: NEGATIVE   Leuk Esterase: NEGATIVE / RBC: 5-10 / WBC 3-5   Sq Epi: OCC / Non Sq Epi: x / Bacteria: NEGATIVE        MICROBIOLOGY:  Culture - Blood (19 @ 21:31)    -  Enterococcus species: + DETECT RON    Culture - Blood:   ***Blood Panel PCR results on this specimen are available  approximately 3 hours after the Gram stain result***  Gram stain, PCR, and/or culture results may not always  correspond due to difference in methodologies  ------------------------------------------------------------  This PCR assay was performed using [employer].  The  following targets are tested for:  Enterococcus, vancomycin  resistant enterococci, Listeria monocytogenes,  coagulase  negative staphylococci, S. aureus, methicillin resistant S.  aureus, Streptococcus agalactiae (Group B), S. pneumoniae,  S. pyogenes (Group A), Acinetobacter baumannii, Enterobacter  cloacae, E. coli, Klebsiella oxytoca, K. pneumoniae, Proteus  sp., Serratia marcescens, Haemophilus influenzae, Neisseria  meningitidis, Pseudomonas aeruginosa, Candida albicans, C.  glabrata, C. krusei, C. parapsilosis, C. tropicalis and the  KPC resistance gene.  **NOTE: Due to technical problems, Proteus sp. will NOT be  reported as part of the BCID paneluntil further notice.    Specimen Source: BLOOD PERIPHERAL    Organism: BLOOD CULTURE PCR    Gram Stain Blood:   ***** CRITICAL RESULT *****  PERSON CALLED / READ-BACK: /YES  DATE / TIME CALLED: 19 1236  CALLED BY: DIANN RUSHING  GPCPR^Gram Pos Cocci in Pairs  AFTER: 14 HOURS INCUBATION  BOTTLE: AEROBIC BOTTLE  GPCC[facility]^Gram Pos Cocci in Chains  AFTER: 14 HOURS INCUBATION  BOTTLE: AEROBIC BOTTLE    Organism Identification: BLOOD CULTURE PCR    Method Type: PCR      RADIOLOGY:  Culture - Blood (19 @ 08:47)    Culture - Blood:   NO ORGANISMS ISOLATED    Specimen Source: BLOOD      < from: CT Abdomen and Pelvis w/ Oral Cont and w/ IV Cont (19 @ 11:24) >    EXAM:  CT ABDOMEN AND PELVIS OC IC        PROCEDURE DATE:  2019         INTERPRETATION:  CLINICAL INFORMATION: 63-year-old female status post   exploratory laparotomy for small bowel obstruction complicated by   evisceration of bowel, post placement of retention suture. Afebrile.      COMPARISON: CT chest, abdomen and pelvis 2019.    PROCEDURE:   CT of the Abdomen and Pelvis was performed with intravenous contrast.   Intravenous contrast: 90 ml Omnipaque 350. 10 ml discarded.  Oral contrast: positive contrast was administered.  Sagittal and coronal reformats were performed.    Evaluation of the upper abdomen is limited secondary to streak artifact.    FINDINGS:    LOWER CHEST: Multifocal airspace opacities, with increased left lower   lobe and right middle lobe consolidation as compared to 2019.   2019.    LIVER: Within normal limits.  BILE DUCTS: Normal caliber.  GALLBLADDER: Within normal limits.  SPLEEN: Subcentimeter hypodense focus in the spleen, too small to   characterize.  PANCREAS: Within normal limits.  ADRENALS: Diminutive in size bilaterally.  KIDNEYS/URETERS: Bilateral cysts and subcentimeter hypodensities, too   small to characterize. No hydronephrosis. A 2 mm nonobstructing right   intrarenal calculus.    BLADDER: Partially collapsed with a Iraheta in place. Small amount of   intraluminal gas consistent with the presence of the Iraheta catheter.  REPRODUCTIVE ORGANS: Fibroid uterus. No adnexal mass.    BOWEL: Enteric tube in the stomach. Rectal tube. No bowel obstruction.   Appendix is normal. Colonic diverticulosis without diverticulitis.  PERITONEUM: No ascites.  VESSELS:  Within normal limits.  RETROPERITONEUM: No lymphadenopathy.    ABDOMINAL WALL: Ventral midline diastasis and open wound. No discrete   abscess or drainable collection. Surrounding soft tissue stranding.   BONES: Degenerative changes with endplate sclerosis at T8-T9, similar to   2017. Old right femoral head fracture, with severe degenerative   changes of the right hip. Sclerosis of the left sacroiliac joint.    IMPRESSION:   Ventral midline diastasis and open wound without evidence for discrete   abscess or drainable collection.    Multifocal pneumonia, with increased consolidation in the right middle   and left lower lobes since 2018.    < end of copied text >    < from: CT Chest w/ IV Cont (19 @ 14:42) >  IMPRESSION:     Multifocal pneumonia.    < end of copied text >

## 2019-01-12 NOTE — PROGRESS NOTE ADULT - ATTENDING COMMENTS
Above noted. Needed re-intubation.   Wound: Unchanged.  CT Scan: Abdomen: Negative, Chest Increased consolidation.  Plan: Continue present management.

## 2019-01-12 NOTE — PROGRESS NOTE ADULT - SUBJECTIVE AND OBJECTIVE BOX
B-Team Surgery Progress Note     Subjective/24hour Events: No acute events overnight. Pain controlled. Tolerating diet. No N/V. No CP or SOB.    Vital Signs:  Vital Signs Last 24 Hrs  T(C): 38.4 (12 Jan 2019 00:00), Max: 38.4 (12 Jan 2019 00:00)  T(F): 101.2 (12 Jan 2019 00:00), Max: 101.2 (12 Jan 2019 00:00)  HR: 87 (12 Jan 2019 04:25) (64 - 113)  BP: --  BP(mean): --  RR: 22 (12 Jan 2019 02:00) (20 - 29)  SpO2: 93% (12 Jan 2019 04:25) (92% - 100%)    CAPILLARY BLOOD GLUCOSE      POCT Blood Glucose.: 156 mg/dL (11 Jan 2019 23:38)  POCT Blood Glucose.: 119 mg/dL (11 Jan 2019 17:39)  POCT Blood Glucose.: 132 mg/dL (11 Jan 2019 12:22)  POCT Blood Glucose.: 121 mg/dL (11 Jan 2019 05:41)      I&O's Detail    10 Soy 2019 07:01  -  11 Jan 2019 07:00  --------------------------------------------------------  IN:    dexmedetomidine Infusion: 366.8 mL    fentaNYL  Infusion: 224.6 mL    Glucerna: 984 mL  Total IN: 1575.4 mL    OUT:    Indwelling Catheter - Urethral: 3660 mL  Total OUT: 3660 mL    Total NET: -2084.6 mL      11 Jan 2019 07:01  -  12 Jan 2019 05:11  --------------------------------------------------------  IN:    dexmedetomidine Infusion: 280 mL    fentaNYL  Infusion: 47.6 mL    Glucerna: 820 mL  Total IN: 1147.6 mL    OUT:    Indwelling Catheter - Urethral: 1900 mL  Total OUT: 1900 mL    Total NET: -752.4 mL            MEDICATIONS  (STANDING):  acetaminophen    Suspension .. 650 milliGRAM(s) Oral every 6 hours  acetazolamide Injectable 250 milliGRAM(s) IV Push two times a day  ALBUTerol    90 MICROgram(s) HFA Inhaler 1 Puff(s) Inhalation every 6 hours  amLODIPine   Tablet 10 milliGRAM(s) Oral daily  buDESOnide 160 MICROgram(s)/formoterol 4.5 MICROgram(s) Inhaler 2 Puff(s) Inhalation two times a day  chlorhexidine 0.12% Liquid 15 milliLiter(s) Swish and Spit two times a day  chlorhexidine 4% Liquid 1 Application(s) Topical <User Schedule>  dexmedetomidine Infusion 0.3 MICROgram(s)/kG/Hr (8.415 mL/Hr) IV Continuous <Continuous>  dextrose 50% Injectable 25 milliLiter(s) IV Push once  furosemide   Injectable 40 milliGRAM(s) IV Push daily  gabapentin   Solution 300 milliGRAM(s) Oral two times a day  heparin  Injectable 5000 Unit(s) SubCutaneous every 8 hours  hydrocortisone sodium succinate Injectable 25 milliGRAM(s) IV Push every 12 hours  influenza   Vaccine 0.5 milliLiter(s) IntraMuscular once  insulin lispro (HumaLOG) corrective regimen sliding scale   SubCutaneous every 6 hours  insulin NPH human recombinant 14 Unit(s) SubCutaneous every 6 hours  levothyroxine 137 MICROGram(s) Oral daily  metoprolol tartrate 25 milliGRAM(s) Enteral Tube two times a day  pantoprazole   Suspension 40 milliGRAM(s) Oral daily  potassium chloride   Powder 40 milliEquivalent(s) Enteral Tube once  simvastatin 20 milliGRAM(s) Oral at bedtime  sodium chloride 3%  Inhalation 4 milliLiter(s) Inhalation every 6 hours    MEDICATIONS  (PRN):  HYDROmorphone  Injectable 0.5 milliGRAM(s) IV Push every 4 hours PRN Pain unrelieved by PO  oxyCODONE    Solution 10 milliGRAM(s) Enteral Tube every 4 hours PRN Severe Pain (7 - 10)  oxyCODONE    Solution 5 milliGRAM(s) Enteral Tube every 4 hours PRN Moderate Pain (4 - 6)        Physical Exam:  Gen: NAD  LS: nml respiratory effort  Card: pulse regularly present  GI: abd soft, nontender  Ext: warm      Labs:    01-12    146<H>  |  101  |  21  ----------------------------<  133<H>  3.5   |  33<H>  |  0.94    Ca    9.3      12 Jan 2019 02:10  Phos  3.7     01-12  Mg     2.1     01-12                              8.1    9.73  )-----------( 457      ( 12 Jan 2019 02:10 )             26.2               Imaging: B-Team Surgery Progress Note     Subjective/24hour Events: No acute events overnight. ET tub came out, and needed to be replaced Pain controlled. Tolerating diet. No N/V. No CP or SOB.    Vital Signs:  Vital Signs Last 24 Hrs  T(C): 38.4 (12 Jan 2019 00:00), Max: 38.4 (12 Jan 2019 00:00)  T(F): 101.2 (12 Jan 2019 00:00), Max: 101.2 (12 Jan 2019 00:00)  HR: 87 (12 Jan 2019 04:25) (64 - 113)  BP: --  BP(mean): --  RR: 22 (12 Jan 2019 02:00) (20 - 29)  SpO2: 93% (12 Jan 2019 04:25) (92% - 100%)    CAPILLARY BLOOD GLUCOSE      POCT Blood Glucose.: 156 mg/dL (11 Jan 2019 23:38)  POCT Blood Glucose.: 119 mg/dL (11 Jan 2019 17:39)  POCT Blood Glucose.: 132 mg/dL (11 Jan 2019 12:22)  POCT Blood Glucose.: 121 mg/dL (11 Jan 2019 05:41)      I&O's Detail    10 Soy 2019 07:01  -  11 Jan 2019 07:00  --------------------------------------------------------  IN:    dexmedetomidine Infusion: 366.8 mL    fentaNYL  Infusion: 224.6 mL    Glucerna: 984 mL  Total IN: 1575.4 mL    OUT:    Indwelling Catheter - Urethral: 3660 mL  Total OUT: 3660 mL    Total NET: -2084.6 mL      11 Jan 2019 07:01  -  12 Jan 2019 05:11  --------------------------------------------------------  IN:    dexmedetomidine Infusion: 280 mL    fentaNYL  Infusion: 47.6 mL    Glucerna: 820 mL  Total IN: 1147.6 mL    OUT:    Indwelling Catheter - Urethral: 1900 mL  Total OUT: 1900 mL    Total NET: -752.4 mL            MEDICATIONS  (STANDING):  acetaminophen    Suspension .. 650 milliGRAM(s) Oral every 6 hours  acetazolamide Injectable 250 milliGRAM(s) IV Push two times a day  ALBUTerol    90 MICROgram(s) HFA Inhaler 1 Puff(s) Inhalation every 6 hours  amLODIPine   Tablet 10 milliGRAM(s) Oral daily  buDESOnide 160 MICROgram(s)/formoterol 4.5 MICROgram(s) Inhaler 2 Puff(s) Inhalation two times a day  chlorhexidine 0.12% Liquid 15 milliLiter(s) Swish and Spit two times a day  chlorhexidine 4% Liquid 1 Application(s) Topical <User Schedule>  dexmedetomidine Infusion 0.3 MICROgram(s)/kG/Hr (8.415 mL/Hr) IV Continuous <Continuous>  dextrose 50% Injectable 25 milliLiter(s) IV Push once  furosemide   Injectable 40 milliGRAM(s) IV Push daily  gabapentin   Solution 300 milliGRAM(s) Oral two times a day  heparin  Injectable 5000 Unit(s) SubCutaneous every 8 hours  hydrocortisone sodium succinate Injectable 25 milliGRAM(s) IV Push every 12 hours  influenza   Vaccine 0.5 milliLiter(s) IntraMuscular once  insulin lispro (HumaLOG) corrective regimen sliding scale   SubCutaneous every 6 hours  insulin NPH human recombinant 14 Unit(s) SubCutaneous every 6 hours  levothyroxine 137 MICROGram(s) Oral daily  metoprolol tartrate 25 milliGRAM(s) Enteral Tube two times a day  pantoprazole   Suspension 40 milliGRAM(s) Oral daily  potassium chloride   Powder 40 milliEquivalent(s) Enteral Tube once  simvastatin 20 milliGRAM(s) Oral at bedtime  sodium chloride 3%  Inhalation 4 milliLiter(s) Inhalation every 6 hours    MEDICATIONS  (PRN):  HYDROmorphone  Injectable 0.5 milliGRAM(s) IV Push every 4 hours PRN Pain unrelieved by PO  oxyCODONE    Solution 10 milliGRAM(s) Enteral Tube every 4 hours PRN Severe Pain (7 - 10)  oxyCODONE    Solution 5 milliGRAM(s) Enteral Tube every 4 hours PRN Moderate Pain (4 - 6)        Physical Exam:  Gen: NAD  LS: nml respiratory effort  Card: pulse regularly present  GI: abd soft, nontender  Ext: warm      Labs:    01-12    146<H>  |  101  |  21  ----------------------------<  133<H>  3.5   |  33<H>  |  0.94    Ca    9.3      12 Jan 2019 02:10  Phos  3.7     01-12  Mg     2.1     01-12                              8.1    9.73  )-----------( 457      ( 12 Jan 2019 02:10 )             26.2               Imaging:

## 2019-01-12 NOTE — CHART NOTE - NSCHARTNOTEFT_GEN_A_CORE
Patient coughed up fully inflated 7-0 ETT at 510am.  It was originally at 22cm at the lip, found to be 16cm after coughing.  was on NRB, satting around high 80s - low 90s.  Anesthesia was called for intubation, respiratory was called for possible BiPAP.  Her oxygen saturations remained above 86-88s.  Anesthesia intubated with glidoscope at first attempt.  Pt received 12mg of etomidate, 80mg succinate.    Rachel Flaherty PGY2 Patient coughed up fully inflated 7-0 ETT at 510am.  It was originally at 22cm at the lip, found to be 16cm after coughing.  was on NRB, satting around high 80s - low 90s.  Anesthesia was called for intubation, respiratory was called for possible BiPAP.  Her oxygen saturations remained above 86-88s.  Anesthesia intubated with glidoscope at first attempt.  Pt received 12mg of etomidate, 80mg succinate.    Rachel Flaherty PGY2    Seen and examined.  Chart and note reviewed, Case discussed with SICU team    Patient recovering from ARDS.  ET tube displaced this am.  Patient with hypoxemia on 100% non-rebreather with shallow rapid breathing and use of accessory  muscles.  Patient initially refused re-intubation with anesthesia team at bedside.  After discussion with family members, she agreed.  She is intubated with vent support.  Will obtain repeat ABG, CXR.  Discuss with family both immediate and long term weaning plans.  Spent 30 minutes in critical care    Julio Fountain

## 2019-01-13 LAB
ANION GAP SERPL CALC-SCNC: 14 MEQ/L — SIGNIFICANT CHANGE UP (ref 7–14)
BASE EXCESS BLDA CALC-SCNC: 1.5 MMOL/L — SIGNIFICANT CHANGE UP
BUN SERPL-MCNC: 20 MG/DL — SIGNIFICANT CHANGE UP (ref 7–23)
CA-I BLDA-SCNC: 1.23 MMOL/L — SIGNIFICANT CHANGE UP (ref 1.15–1.29)
CALCIUM SERPL-MCNC: 9 MG/DL — SIGNIFICANT CHANGE UP (ref 8.4–10.5)
CHLORIDE SERPL-SCNC: 103 MMOL/L — SIGNIFICANT CHANGE UP (ref 98–107)
CO2 SERPL-SCNC: 24 MMOL/L — SIGNIFICANT CHANGE UP (ref 22–31)
CREAT SERPL-MCNC: 1.03 MG/DL — SIGNIFICANT CHANGE UP (ref 0.5–1.3)
GLUCOSE BLDA-MCNC: 239 MG/DL — HIGH (ref 70–99)
GLUCOSE BLDC GLUCOMTR-MCNC: 135 MG/DL — HIGH (ref 70–99)
GLUCOSE BLDC GLUCOMTR-MCNC: 206 MG/DL — HIGH (ref 70–99)
GLUCOSE BLDC GLUCOMTR-MCNC: 235 MG/DL — HIGH (ref 70–99)
GLUCOSE BLDC GLUCOMTR-MCNC: 51 MG/DL — LOW (ref 70–99)
GLUCOSE SERPL-MCNC: 258 MG/DL — HIGH (ref 70–99)
HCO3 BLDA-SCNC: 26 MMOL/L — SIGNIFICANT CHANGE UP (ref 22–26)
HCT VFR BLD CALC: 27.5 % — LOW (ref 34.5–45)
HCT VFR BLDA CALC: 26.4 % — LOW (ref 34.5–46.5)
HGB BLD-MCNC: 8.5 G/DL — LOW (ref 11.5–15.5)
HGB BLDA-MCNC: 8.5 G/DL — LOW (ref 11.5–15.5)
LACTATE BLDA-SCNC: 1.3 MMOL/L — SIGNIFICANT CHANGE UP (ref 0.5–2)
MAGNESIUM SERPL-MCNC: 2.2 MG/DL — SIGNIFICANT CHANGE UP (ref 1.6–2.6)
MCHC RBC-ENTMCNC: 30.7 PG — SIGNIFICANT CHANGE UP (ref 27–34)
MCHC RBC-ENTMCNC: 30.9 % — LOW (ref 32–36)
MCV RBC AUTO: 99.3 FL — SIGNIFICANT CHANGE UP (ref 80–100)
NRBC # FLD: 0.06 K/UL — LOW (ref 25–125)
ORGANISM # SPEC MICROSCOPIC CNT: SIGNIFICANT CHANGE UP
PCO2 BLDA: 43 MMHG — SIGNIFICANT CHANGE UP (ref 32–48)
PH BLDA: 7.4 PH — SIGNIFICANT CHANGE UP (ref 7.35–7.45)
PHOSPHATE SERPL-MCNC: 4.6 MG/DL — HIGH (ref 2.5–4.5)
PLATELET # BLD AUTO: 442 K/UL — HIGH (ref 150–400)
PMV BLD: 9.7 FL — SIGNIFICANT CHANGE UP (ref 7–13)
PO2 BLDA: 72 MMHG — LOW (ref 83–108)
POTASSIUM BLDA-SCNC: 4.1 MMOL/L — SIGNIFICANT CHANGE UP (ref 3.4–4.5)
POTASSIUM SERPL-MCNC: 4.3 MMOL/L — SIGNIFICANT CHANGE UP (ref 3.5–5.3)
POTASSIUM SERPL-SCNC: 4.3 MMOL/L — SIGNIFICANT CHANGE UP (ref 3.5–5.3)
RBC # BLD: 2.77 M/UL — LOW (ref 3.8–5.2)
RBC # FLD: 16.3 % — HIGH (ref 10.3–14.5)
SAO2 % BLDA: 92.7 % — LOW (ref 95–99)
SODIUM BLDA-SCNC: 141 MMOL/L — SIGNIFICANT CHANGE UP (ref 136–146)
SODIUM SERPL-SCNC: 141 MMOL/L — SIGNIFICANT CHANGE UP (ref 135–145)
WBC # BLD: 9.97 K/UL — SIGNIFICANT CHANGE UP (ref 3.8–10.5)
WBC # FLD AUTO: 9.97 K/UL — SIGNIFICANT CHANGE UP (ref 3.8–10.5)

## 2019-01-13 PROCEDURE — 36556 INSERT NON-TUNNEL CV CATH: CPT | Mod: GC

## 2019-01-13 PROCEDURE — 71045 X-RAY EXAM CHEST 1 VIEW: CPT | Mod: 26

## 2019-01-13 PROCEDURE — 99292 CRITICAL CARE ADDL 30 MIN: CPT | Mod: 25

## 2019-01-13 PROCEDURE — 99233 SBSQ HOSP IP/OBS HIGH 50: CPT

## 2019-01-13 PROCEDURE — 93306 TTE W/DOPPLER COMPLETE: CPT | Mod: 26

## 2019-01-13 PROCEDURE — 71045 X-RAY EXAM CHEST 1 VIEW: CPT | Mod: 26,77

## 2019-01-13 PROCEDURE — 99291 CRITICAL CARE FIRST HOUR: CPT | Mod: 25

## 2019-01-13 RX ORDER — SODIUM CHLORIDE 9 MG/ML
500 INJECTION, SOLUTION INTRAVENOUS ONCE
Qty: 0 | Refills: 0 | Status: DISCONTINUED | OUTPATIENT
Start: 2019-01-13 | End: 2019-01-13

## 2019-01-13 RX ORDER — FENTANYL CITRATE 50 UG/ML
50 INJECTION INTRAVENOUS ONCE
Qty: 0 | Refills: 0 | Status: DISCONTINUED | OUTPATIENT
Start: 2019-01-13 | End: 2019-01-13

## 2019-01-13 RX ORDER — ACETAMINOPHEN 500 MG
1000 TABLET ORAL ONCE
Qty: 0 | Refills: 0 | Status: COMPLETED | OUTPATIENT
Start: 2019-01-13 | End: 2019-01-13

## 2019-01-13 RX ORDER — DEXTROSE 50 % IN WATER 50 %
50 SYRINGE (ML) INTRAVENOUS ONCE
Qty: 0 | Refills: 0 | Status: COMPLETED | OUTPATIENT
Start: 2019-01-13 | End: 2019-01-13

## 2019-01-13 RX ORDER — PROPOFOL 10 MG/ML
50 INJECTION, EMULSION INTRAVENOUS
Qty: 1000 | Refills: 0 | Status: DISCONTINUED | OUTPATIENT
Start: 2019-01-13 | End: 2019-01-14

## 2019-01-13 RX ADMIN — HYDROMORPHONE HYDROCHLORIDE 0.5 MILLIGRAM(S): 2 INJECTION INTRAMUSCULAR; INTRAVENOUS; SUBCUTANEOUS at 20:15

## 2019-01-13 RX ADMIN — OXYCODONE HYDROCHLORIDE 10 MILLIGRAM(S): 5 TABLET ORAL at 01:02

## 2019-01-13 RX ADMIN — HYDROMORPHONE HYDROCHLORIDE 0.5 MILLIGRAM(S): 2 INJECTION INTRAMUSCULAR; INTRAVENOUS; SUBCUTANEOUS at 09:15

## 2019-01-13 RX ADMIN — OXYCODONE HYDROCHLORIDE 10 MILLIGRAM(S): 5 TABLET ORAL at 21:33

## 2019-01-13 RX ADMIN — SODIUM CHLORIDE 4 MILLILITER(S): 9 INJECTION INTRAMUSCULAR; INTRAVENOUS; SUBCUTANEOUS at 22:46

## 2019-01-13 RX ADMIN — ALBUTEROL 1 PUFF(S): 90 AEROSOL, METERED ORAL at 16:11

## 2019-01-13 RX ADMIN — HUMAN INSULIN 14 UNIT(S): 100 INJECTION, SUSPENSION SUBCUTANEOUS at 12:09

## 2019-01-13 RX ADMIN — BUDESONIDE AND FORMOTEROL FUMARATE DIHYDRATE 2 PUFF(S): 160; 4.5 AEROSOL RESPIRATORY (INHALATION) at 22:44

## 2019-01-13 RX ADMIN — Medication 250 MILLIGRAM(S): at 18:15

## 2019-01-13 RX ADMIN — SODIUM CHLORIDE 4 MILLILITER(S): 9 INJECTION INTRAMUSCULAR; INTRAVENOUS; SUBCUTANEOUS at 16:11

## 2019-01-13 RX ADMIN — HYDROMORPHONE HYDROCHLORIDE 0.5 MILLIGRAM(S): 2 INJECTION INTRAMUSCULAR; INTRAVENOUS; SUBCUTANEOUS at 09:30

## 2019-01-13 RX ADMIN — Medication 400 MILLIGRAM(S): at 23:38

## 2019-01-13 RX ADMIN — Medication 137 MICROGRAM(S): at 05:40

## 2019-01-13 RX ADMIN — OXYCODONE HYDROCHLORIDE 5 MILLIGRAM(S): 5 TABLET ORAL at 16:30

## 2019-01-13 RX ADMIN — OXYCODONE HYDROCHLORIDE 10 MILLIGRAM(S): 5 TABLET ORAL at 00:32

## 2019-01-13 RX ADMIN — CHLORHEXIDINE GLUCONATE 15 MILLILITER(S): 213 SOLUTION TOPICAL at 05:39

## 2019-01-13 RX ADMIN — Medication 40 MILLIGRAM(S): at 05:39

## 2019-01-13 RX ADMIN — OXYCODONE HYDROCHLORIDE 10 MILLIGRAM(S): 5 TABLET ORAL at 12:55

## 2019-01-13 RX ADMIN — HEPARIN SODIUM 5000 UNIT(S): 5000 INJECTION INTRAVENOUS; SUBCUTANEOUS at 14:15

## 2019-01-13 RX ADMIN — HUMAN INSULIN 14 UNIT(S): 100 INJECTION, SUSPENSION SUBCUTANEOUS at 05:54

## 2019-01-13 RX ADMIN — Medication 50 MILLILITER(S): at 18:10

## 2019-01-13 RX ADMIN — OXYCODONE HYDROCHLORIDE 10 MILLIGRAM(S): 5 TABLET ORAL at 06:30

## 2019-01-13 RX ADMIN — ALBUTEROL 1 PUFF(S): 90 AEROSOL, METERED ORAL at 10:17

## 2019-01-13 RX ADMIN — OXYCODONE HYDROCHLORIDE 5 MILLIGRAM(S): 5 TABLET ORAL at 16:02

## 2019-01-13 RX ADMIN — GABAPENTIN 300 MILLIGRAM(S): 400 CAPSULE ORAL at 06:34

## 2019-01-13 RX ADMIN — GABAPENTIN 300 MILLIGRAM(S): 400 CAPSULE ORAL at 18:15

## 2019-01-13 RX ADMIN — FENTANYL CITRATE 50 MICROGRAM(S): 50 INJECTION INTRAVENOUS at 18:30

## 2019-01-13 RX ADMIN — SODIUM CHLORIDE 4 MILLILITER(S): 9 INJECTION INTRAMUSCULAR; INTRAVENOUS; SUBCUTANEOUS at 10:17

## 2019-01-13 RX ADMIN — Medication 25 MILLIGRAM(S): at 05:40

## 2019-01-13 RX ADMIN — AMLODIPINE BESYLATE 10 MILLIGRAM(S): 2.5 TABLET ORAL at 05:40

## 2019-01-13 RX ADMIN — Medication 4: at 12:10

## 2019-01-13 RX ADMIN — HEPARIN SODIUM 5000 UNIT(S): 5000 INJECTION INTRAVENOUS; SUBCUTANEOUS at 05:40

## 2019-01-13 RX ADMIN — HYDROMORPHONE HYDROCHLORIDE 0.5 MILLIGRAM(S): 2 INJECTION INTRAMUSCULAR; INTRAVENOUS; SUBCUTANEOUS at 19:45

## 2019-01-13 RX ADMIN — Medication 650 MILLIGRAM(S): at 01:02

## 2019-01-13 RX ADMIN — PANTOPRAZOLE SODIUM 40 MILLIGRAM(S): 20 TABLET, DELAYED RELEASE ORAL at 12:07

## 2019-01-13 RX ADMIN — PIPERACILLIN AND TAZOBACTAM 25 GRAM(S): 4; .5 INJECTION, POWDER, LYOPHILIZED, FOR SOLUTION INTRAVENOUS at 09:30

## 2019-01-13 RX ADMIN — PIPERACILLIN AND TAZOBACTAM 25 GRAM(S): 4; .5 INJECTION, POWDER, LYOPHILIZED, FOR SOLUTION INTRAVENOUS at 18:17

## 2019-01-13 RX ADMIN — PIPERACILLIN AND TAZOBACTAM 25 GRAM(S): 4; .5 INJECTION, POWDER, LYOPHILIZED, FOR SOLUTION INTRAVENOUS at 02:20

## 2019-01-13 RX ADMIN — OXYCODONE HYDROCHLORIDE 10 MILLIGRAM(S): 5 TABLET ORAL at 12:40

## 2019-01-13 RX ADMIN — FENTANYL CITRATE 50 MICROGRAM(S): 50 INJECTION INTRAVENOUS at 19:00

## 2019-01-13 RX ADMIN — Medication 250 MILLIGRAM(S): at 05:39

## 2019-01-13 RX ADMIN — Medication 100 MILLIEQUIVALENT(S): at 00:00

## 2019-01-13 RX ADMIN — SODIUM CHLORIDE 4 MILLILITER(S): 9 INJECTION INTRAMUSCULAR; INTRAVENOUS; SUBCUTANEOUS at 04:44

## 2019-01-13 RX ADMIN — OXYCODONE HYDROCHLORIDE 10 MILLIGRAM(S): 5 TABLET ORAL at 06:00

## 2019-01-13 RX ADMIN — BUDESONIDE AND FORMOTEROL FUMARATE DIHYDRATE 2 PUFF(S): 160; 4.5 AEROSOL RESPIRATORY (INHALATION) at 10:17

## 2019-01-13 RX ADMIN — CHLORHEXIDINE GLUCONATE 1 APPLICATION(S): 213 SOLUTION TOPICAL at 12:09

## 2019-01-13 RX ADMIN — Medication 650 MILLIGRAM(S): at 00:32

## 2019-01-13 RX ADMIN — ALBUTEROL 1 PUFF(S): 90 AEROSOL, METERED ORAL at 04:41

## 2019-01-13 RX ADMIN — Medication 4: at 05:54

## 2019-01-13 RX ADMIN — ALBUTEROL 1 PUFF(S): 90 AEROSOL, METERED ORAL at 22:43

## 2019-01-13 RX ADMIN — Medication 25 MILLIGRAM(S): at 18:15

## 2019-01-13 RX ADMIN — OXYCODONE HYDROCHLORIDE 10 MILLIGRAM(S): 5 TABLET ORAL at 21:03

## 2019-01-13 NOTE — PROGRESS NOTE ADULT - SUBJECTIVE AND OBJECTIVE BOX
Surgery Progress Note    S: Patient seen and examined. Patient self extubated  y/d, reintubated. Remains on vent with PEEP at 10. Awake this AM. Pain well controlled. Off pressors     O:  Vital Signs Last 24 Hrs  T(C): 37.7 (13 Jan 2019 04:00), Max: 38.1 (12 Jan 2019 12:00)  T(F): 99.9 (13 Jan 2019 04:00), Max: 100.6 (12 Jan 2019 12:00)  HR: 85 (13 Jan 2019 07:32) (58 - 85)  BP: --  BP(mean): --  RR: 26 (13 Jan 2019 07:00) (21 - 26)  SpO2: 95% (13 Jan 2019 07:32) (91% - 98%)    I&O's Detail    12 Jan 2019 07:01  -  13 Jan 2019 07:00  --------------------------------------------------------  IN:    dexmedetomidine Infusion: 213.8 mL    dexmedetomidine Infusion: 145 mL    Glucerna: 902 mL    IV PiggyBack: 450 mL    Oral Fluid: 600 mL  Total IN: 2310.8 mL    OUT:    Indwelling Catheter - Urethral: 1850 mL    Rectal Tube: 300 mL  Total OUT: 2150 mL    Total NET: 160.8 mL      13 Jan 2019 07:01  -  13 Jan 2019 09:42  --------------------------------------------------------  IN:    dexmedetomidine Infusion: 11.2 mL    Glucerna: 41 mL  Total IN: 52.2 mL    OUT:  Total OUT: 0 mL    Total NET: 52.2 mL          MEDICATIONS  (STANDING):  ALBUTerol    90 MICROgram(s) HFA Inhaler 1 Puff(s) Inhalation every 6 hours  amLODIPine   Tablet 10 milliGRAM(s) Oral daily  buDESOnide 160 MICROgram(s)/formoterol 4.5 MICROgram(s) Inhaler 2 Puff(s) Inhalation two times a day  chlorhexidine 0.12% Liquid 15 milliLiter(s) Swish and Spit two times a day  chlorhexidine 4% Liquid 1 Application(s) Topical <User Schedule>  dexmedetomidine Infusion 0.3 MICROgram(s)/kG/Hr (8.415 mL/Hr) IV Continuous <Continuous>  dextrose 50% Injectable 25 milliLiter(s) IV Push once  furosemide   Injectable 40 milliGRAM(s) IV Push daily  gabapentin   Solution 300 milliGRAM(s) Oral two times a day  heparin  Injectable 5000 Unit(s) SubCutaneous every 8 hours  hydrocortisone sodium succinate Injectable 25 milliGRAM(s) IV Push every 12 hours  influenza   Vaccine 0.5 milliLiter(s) IntraMuscular once  insulin lispro (HumaLOG) corrective regimen sliding scale   SubCutaneous every 6 hours  insulin NPH human recombinant 14 Unit(s) SubCutaneous every 6 hours  levothyroxine 137 MICROGram(s) Oral daily  metoprolol tartrate 25 milliGRAM(s) Enteral Tube two times a day  pantoprazole   Suspension 40 milliGRAM(s) Oral daily  piperacillin/tazobactam IVPB. 3.375 Gram(s) IV Intermittent every 8 hours  simvastatin 20 milliGRAM(s) Oral at bedtime  sodium chloride 3%  Inhalation 4 milliLiter(s) Inhalation every 6 hours  vancomycin  IVPB      vancomycin  IVPB 1000 milliGRAM(s) IV Intermittent every 12 hours    MEDICATIONS  (PRN):  HYDROmorphone  Injectable 0.5 milliGRAM(s) IV Push every 4 hours PRN Pain unrelieved by PO  oxyCODONE    Solution 10 milliGRAM(s) Enteral Tube every 4 hours PRN Severe Pain (7 - 10)  oxyCODONE    Solution 5 milliGRAM(s) Enteral Tube every 4 hours PRN Moderate Pain (4 - 6)                            8.5    9.97  )-----------( 442      ( 13 Jan 2019 03:11 )             27.5       01-13    141  |  103  |  20  ----------------------------<  258<H>  4.3   |  24  |  1.03    Ca    9.0      13 Jan 2019 03:11  Phos  4.6     01-13  Mg     2.2     01-13        Physical Exam:  Gen: Laying in bed, NAD  Resp: Unlabored breathing  Abd: soft, mild TTP, distended, retention sutures in place, with seropurulent drainage from wound  Ext: WWP  Skin: No rashes Surgery Progress Note    S: Patient seen and examined. Patient self extubated  y/d, reintubated. Remains on vent with PEEP at 10. Awake this AM. Pain well controlled. Off pressors     O:  Vital Signs Last 24 Hrs  T(C): 37.7 (13 Jan 2019 04:00), Max: 38.1 (12 Jan 2019 12:00)  T(F): 99.9 (13 Jan 2019 04:00), Max: 100.6 (12 Jan 2019 12:00)  HR: 85 (13 Jan 2019 07:32) (58 - 85)  BP: --  BP(mean): --  RR: 26 (13 Jan 2019 07:00) (21 - 26)  SpO2: 95% (13 Jan 2019 07:32) (91% - 98%)    I&O's Detail    12 Jan 2019 07:01  -  13 Jan 2019 07:00  --------------------------------------------------------  IN:    dexmedetomidine Infusion: 213.8 mL    dexmedetomidine Infusion: 145 mL    Glucerna: 902 mL    IV PiggyBack: 450 mL    Oral Fluid: 600 mL  Total IN: 2310.8 mL    OUT:    Indwelling Catheter - Urethral: 1850 mL    Rectal Tube: 300 mL  Total OUT: 2150 mL    Total NET: 160.8 mL      13 Jan 2019 07:01  -  13 Jan 2019 09:42  --------------------------------------------------------  IN:    dexmedetomidine Infusion: 11.2 mL    Glucerna: 41 mL  Total IN: 52.2 mL    OUT:  Total OUT: 0 mL    Total NET: 52.2 mL          MEDICATIONS  (STANDING):  ALBUTerol    90 MICROgram(s) HFA Inhaler 1 Puff(s) Inhalation every 6 hours  amLODIPine   Tablet 10 milliGRAM(s) Oral daily  buDESOnide 160 MICROgram(s)/formoterol 4.5 MICROgram(s) Inhaler 2 Puff(s) Inhalation two times a day  chlorhexidine 0.12% Liquid 15 milliLiter(s) Swish and Spit two times a day  chlorhexidine 4% Liquid 1 Application(s) Topical <User Schedule>  dexmedetomidine Infusion 0.3 MICROgram(s)/kG/Hr (8.415 mL/Hr) IV Continuous <Continuous>  dextrose 50% Injectable 25 milliLiter(s) IV Push once  furosemide   Injectable 40 milliGRAM(s) IV Push daily  gabapentin   Solution 300 milliGRAM(s) Oral two times a day  heparin  Injectable 5000 Unit(s) SubCutaneous every 8 hours  hydrocortisone sodium succinate Injectable 25 milliGRAM(s) IV Push every 12 hours  influenza   Vaccine 0.5 milliLiter(s) IntraMuscular once  insulin lispro (HumaLOG) corrective regimen sliding scale   SubCutaneous every 6 hours  insulin NPH human recombinant 14 Unit(s) SubCutaneous every 6 hours  levothyroxine 137 MICROGram(s) Oral daily  metoprolol tartrate 25 milliGRAM(s) Enteral Tube two times a day  pantoprazole   Suspension 40 milliGRAM(s) Oral daily  piperacillin/tazobactam IVPB. 3.375 Gram(s) IV Intermittent every 8 hours  simvastatin 20 milliGRAM(s) Oral at bedtime  sodium chloride 3%  Inhalation 4 milliLiter(s) Inhalation every 6 hours  vancomycin  IVPB      vancomycin  IVPB 1000 milliGRAM(s) IV Intermittent every 12 hours    MEDICATIONS  (PRN):  HYDROmorphone  Injectable 0.5 milliGRAM(s) IV Push every 4 hours PRN Pain unrelieved by PO  oxyCODONE    Solution 10 milliGRAM(s) Enteral Tube every 4 hours PRN Severe Pain (7 - 10)  oxyCODONE    Solution 5 milliGRAM(s) Enteral Tube every 4 hours PRN Moderate Pain (4 - 6)                            8.5    9.97  )-----------( 442      ( 13 Jan 2019 03:11 )             27.5       01-13    141  |  103  |  20  ----------------------------<  258<H>  4.3   |  24  |  1.03    Ca    9.0      13 Jan 2019 03:11  Phos  4.6     01-13  Mg     2.2     01-13        Physical Exam:  Gen: Laying in bed, intubated   Resp: intubated on vent   Abd: soft, mild TTP, distended, retention sutures in place, with seropurulent drainage from wound  Ext: WWP  Skin: No rashes

## 2019-01-13 NOTE — PROGRESS NOTE ADULT - SUBJECTIVE AND OBJECTIVE BOX
SICU  Progress Note    HISTORY  63y Female with pmh COPD, DM, RA, hypothyroid, pulm HTN, CHRIS, HTN admitted on 12/15 for SBO, underwent exploratory laparotomy and adhesiolysis, no transition point was noted. pt transferred to SICU for intentional delayed extubation post op due to pulm comorbidities.   transferred to floors after extubation, eviscerated after having violent coughing episode, RTOR, returned to SICU for intentional delayed extubation. Pt transferred to RCU as developed viral multifocal pneumonia, returned for worsening respi distress, intubated in SICU and remains intubated till date. Currently course c/b E fecalis bacteremia.     24 HOUR EVENTS: pt had change of right IJ central line to left IJ. Right brachial A line still in situ. Pt remains febrile, reculture today has not been sent due to difficult stick. Trial to wean off ventilator not done yd due to active issues. Propofol gtt d/c ed due to lowering trends in BP.         Vital Signs Last 24 Hrs  T(C): 38.2 (14 Jan 2019 00:00), Max: 38.2 (14 Jan 2019 00:00)  T(F): 100.8 (14 Jan 2019 00:00), Max: 100.8 (14 Jan 2019 00:00)  HR: 76 (14 Jan 2019 08:00) (68 - 105)  BP: 109/67 (13 Jan 2019 23:00) (109/67 - 115/66)  BP(mean): 77 (13 Jan 2019 23:00) (77 - 78)  RR: 22 (14 Jan 2019 08:00) (14 - 29)  SpO2: 95% (14 Jan 2019 08:00) (93% - 100%)    Physical Exam:  General Appearance: Appears well, NAD  Respiratory: No labored breathing  CV: Pulse regularly present  Abdomen: Soft, nontense, TTP, nondistended, incisions CDI      LABS:                        7.1    8.68  )-----------( 362      ( 14 Jan 2019 03:45 )             23.4     01-14    143  |  105  |  18  ----------------------------<  98  3.1<L>   |  24  |  1.12    Ca    8.3<L>      14 Jan 2019 03:45  Phos  3.5     01-14  Mg     2.0     01-14            INs and OUTs:    01-13-19 @ 07:01  -  01-14-19 @ 07:00  --------------------------------------------------------  IN: 1925.1 mL / OUT: 3040 mL / NET: -1114.9 mL

## 2019-01-13 NOTE — PROGRESS NOTE ADULT - SUBJECTIVE AND OBJECTIVE BOX
Follow Up:      Inverval History/ROS:Patient is a 63y old  Female who presents with a chief complaint of ex lap for sbo f/b ex lap for evisceration c/b viral pneumonia (13 Jan 2019 00:01)    Intubated. More alert. Son at bedside.  No fever    Allergies    No Known Allergies    Intolerances        ANTIMICROBIALS:  piperacillin/tazobactam IVPB. 3.375 every 8 hours  vancomycin  IVPB    vancomycin  IVPB 1000 every 12 hours      OTHER MEDS:  ALBUTerol    90 MICROgram(s) HFA Inhaler 1 Puff(s) Inhalation every 6 hours  amLODIPine   Tablet 10 milliGRAM(s) Oral daily  buDESOnide 160 MICROgram(s)/formoterol 4.5 MICROgram(s) Inhaler 2 Puff(s) Inhalation two times a day  chlorhexidine 0.12% Liquid 15 milliLiter(s) Swish and Spit two times a day  chlorhexidine 4% Liquid 1 Application(s) Topical <User Schedule>  dexmedetomidine Infusion 0.3 MICROgram(s)/kG/Hr IV Continuous <Continuous>  dextrose 50% Injectable 25 milliLiter(s) IV Push once  furosemide   Injectable 40 milliGRAM(s) IV Push daily  gabapentin   Solution 300 milliGRAM(s) Oral two times a day  heparin  Injectable 5000 Unit(s) SubCutaneous every 8 hours  hydrocortisone sodium succinate Injectable 25 milliGRAM(s) IV Push every 12 hours  HYDROmorphone  Injectable 0.5 milliGRAM(s) IV Push every 4 hours PRN  influenza   Vaccine 0.5 milliLiter(s) IntraMuscular once  insulin lispro (HumaLOG) corrective regimen sliding scale   SubCutaneous every 6 hours  insulin NPH human recombinant 14 Unit(s) SubCutaneous every 6 hours  levothyroxine 137 MICROGram(s) Oral daily  metoprolol tartrate 25 milliGRAM(s) Enteral Tube two times a day  oxyCODONE    Solution 10 milliGRAM(s) Enteral Tube every 4 hours PRN  oxyCODONE    Solution 5 milliGRAM(s) Enteral Tube every 4 hours PRN  pantoprazole   Suspension 40 milliGRAM(s) Oral daily  simvastatin 20 milliGRAM(s) Oral at bedtime  sodium chloride 3%  Inhalation 4 milliLiter(s) Inhalation every 6 hours      Vital Signs Last 24 Hrs  T(C): 37.8 (13 Jan 2019 12:00), Max: 37.9 (13 Jan 2019 08:00)  T(F): 100.1 (13 Jan 2019 12:00), Max: 100.2 (13 Jan 2019 08:00)  HR: 87 (13 Jan 2019 12:00) (58 - 97)  BP: 115/66 (13 Jan 2019 11:00) (115/66 - 115/66)  BP(mean): 78 (13 Jan 2019 11:00) (78 - 78)  RR: 22 (13 Jan 2019 12:00) (14 - 27)  SpO2: 97% (13 Jan 2019 12:00) (92% - 99%)    PHYSICAL EXAM:  General: [ x] non-toxic  HEAD/EYES: [ ] PERRL [x ] white sclera [ ] icterus  ENT:  [ ] normal [x ] supple [ ] thrush [ ] pharyngeal exudate  Cardiovascular:   [ ] murmur [ x] normal [ ] PPM/AICD  Respiratory:  [ x] clear to ausculation bilaterally  GI:  [ x] soft, retention sutures in place    Lower pole of wound with some discharge  :  [ x] iraheta [ ] no CVA tenderness   Musculoskeletal:  [ ] no synovitis  Neurologic:  [ ] non-focal exam   Skin:  [x ] no rash  Lymph: [ ] no lymphadenopathy  Psychiatric:  [x ] appropriate affect [ ] alert & oriented  Lines:  x[x ] no phlebitis [ ] central line                                8.5    9.97  )-----------( 442      ( 13 Jan 2019 03:11 )             27.5       01-13    141  |  103  |  20  ----------------------------<  258<H>  4.3   |  24  |  1.03    Ca    9.0      13 Jan 2019 03:11  Phos  4.6     01-13  Mg     2.2     01-13            MICROBIOLOGY:Culture - Respiratory:   NRF^Normal Respiratory Perla  QUANTITY OF GROWTH: MANY (01-09-19 @ 12:31)      RADIOLOGY:

## 2019-01-13 NOTE — PROGRESS NOTE ADULT - ASSESSMENT
63y Female hx of COPD presented initially with SBO, underwent surgery closed with 4 retention sutures, evicerated on 12/30 and closed with 6 more retention sutures, now c/b multifocal pneumonia, flu and respiratory distress /hypoxia on continuous bipap. Patient temporarily transferred to RCU, but developed hypercapnia and respiratory distress requiring intubation 1/6 and subsequent transfer to SICU. S/p bronch 1/9/18    #neuro  Awake, alert, follows commands, RASS 0  - Continue precedex  - Pain control w/ standing acetaminophen, PRN oxycodone w/ breakthrough dilaudid  - Attempt sedation holiday in AM daily    #resp  Hx COPD and pulm HTN, current smoker, course c/b viral pneumonia, s/p bronch 1/9  - Intubated, on CPAP now, FIO2 50%, try to wean down  - daily CXR AM for tube position checks  - ABG checks as appropriate  - C/w inhalers, pulmozyme, pulmonary toilet   - OOB to chair     #CV  Vitals stable  - Continue amlodipine 10mg  - Metoprolol 25mg po BID   - Monitor A-line site, changed over guidewire on 1/9, difficult Arterial access low axillary/brachial    #GI  s/p ex-lap for evisceration, retention sutures placed  - Daily wound check   - c/w protonix daily  - c/w TF 41 ml/hr, 24 hrs    #renal  - BMP and I&O monitoring   - C/w iraheta for UOP monitoring   - Repletion of electrolytes PRN  - Lasix 40mg IV qd  - Diamox 250mg BID for metabolic alkalosis     #heme  - stable H/H, s/p 1 prbc transfusion 1/8  - SQH for dvt ppx  - b/l LE venous duplex to r/o DVT    #ID  Afebrile, WBC counts stable   - fever workup pending, blood cultures growing enterococcus   - lines: iraheta, rt IJV, rt brachial A line, amaury, ET      #endo  - hx RA on chronic prednisone, DM  - Hydrocortisone BID, remains higher than home dose   - NPH 14u q6h while 24 hr continuous tube feeds continued, moderate dose corrective scale q6h  - Endo following, appreciate recs  - Synthroid 137mcg qhs    #dispo  -SICU 63y Female hx of COPD presented initially with SBO, underwent surgery closed with 4 retention sutures, evicerated on 12/30 and closed with 6 more retention sutures, now c/b multifocal pneumonia, flu and respiratory distress /hypoxia on continuous bipap. Patient temporarily transferred to RCU, but developed hypercapnia and respiratory distress requiring intubation 1/6 and subsequent transfer to SICU. S/p bronch 1/9/18    #neuro  Awake, alert, follows commands, RASS 0  - Continue precedex  - Pain control w/ standing acetaminophen and hydromorphone 0.5 mg q4h, PRN oxycodone     #resp  Hx COPD and pulm HTN, current smoker, course c/b viral pneumonia, s/p bronch 1/9  - Intubated, on AC CMV now, FIO2 50%, try to wean down and switch to CPAP  - daily CXR AM for tube position checks  - ABG checks as appropriate  - C/w inhalers, pulmozyme, pulmonary toilet   - OOB to chair     #CV  Vitals stable  - Continue amlodipine 10mg  - Metoprolol 25mg po BID   - Monitor A-line site, changed over guidewire on 1/9, difficult Arterial access low axillary/brachial    #GI  s/p ex-lap for evisceration, retention sutures placed  - Daily wound check   - c/w protonix daily  - c/w TF 41 ml/hr, 24 hrs    #renal  - BMP and I&O monitoring   - C/w iraheta for UOP monitoring   - Repletion of electrolytes PRN  - Lasix 40mg IV qd  - Diamox 250mg BID for metabolic alkalosis     #heme  - stable H/H, s/p 1 prbc transfusion 1/8  - SQH for dvt ppx  - b/l LE venous duplex to r/o DVT    #ID  spiked fevers in last 2 days, blood cultures sent, started on zosyn and vanco  - blood cultures growing enterococcus, wound culture in lab   - lines: myrtle, rt IJV (1/6), rt brachial A line, amaury ET  -appreciate ID consult  -plan to switch A line and central line today   - TTE on Monday to r/o endocarditis     #endo  - hx RA on chronic prednisone, DM, hypothyroid  - Hydrocortisone 25 mg BID, do not titrate further down as this is nearly equivalent to home dose   - NPH 14u q6h while 22 hr continuous tube feeds continued, moderate dose corrective scale q6h  - Endo following, appreciate recs  - Synthroid 137mcg qhs    #dispo  -SICU  -GOC discussion with family and confirm HCP

## 2019-01-13 NOTE — PROGRESS NOTE ADULT - ATTENDING COMMENTS
Critical Care Dx    Addition 30min spent in assess volume status, vent requirements and coordination of care.  Respiratory failure  At risk for malnutrition   Steroid dependent     The patient is a critical care patient with life threatening hemodynamic and respiratory instability in SICU.  I have personally interviewed and examined the patient, reviewed data and laboratory tests/x-rays and all pertinent electronic images.  The SICU team has a constant risk benefit analyzes discussion with the primary team, all consultants, House Staff and PA's on all decisions.  These diagnoses are unrelated to the surgical procedure noted.  Time involved in performance of separately billable procedures was not counted toward my critical care time. There is no overlap.    I was physically present for the key portions of the evaluation and management (E/M) service provided.  I agree with the above history, physical, and plan which I have reviewed and edited where appropriate.     Ryan Bear MD  Acute and Critical Care Surgery

## 2019-01-13 NOTE — PROGRESS NOTE ADULT - ASSESSMENT
63y Female hx of COPD presented initially with SBO, underwent surgery closed with 4 retention sutures, evicerated on 12/30 and closed with 6 more retention sutures, now c/b multifocal pneumonia, flu and respiratory distress /hypoxia on continuous bipap. Patient temporarily transferred to RCU, but developed hypercapnia and respiratory distress requiring intubation 1/6 and subsequent transfer to SICU. S/p bronch 1/9/18    #neuro  Awake, alert, follows commands, RASS 0  - Continue precedex  - Pain control w/ standing acetaminophen and hydromorphone 0.5 mg q4h, PRN oxycodone   - C/w gabapentin    #resp  Hx COPD and pulm HTN, current smoker, course c/b viral pneumonia, s/p bronch 1/9  - Intubated, on AC CMV now, FIO2 50%, try to wean down and switch to CPAP  - daily CXR AM for tube position checks  - ABG checks as appropriate  - C/w inhalers, pulmozyme, pulmonary toilet   - OOB to chair     #CV  Vitals stable  - Continue amlodipine 10mg  - Metoprolol 25mg po BID   - Monitor A-line site, changed over guidewire on 1/9, difficult Arterial access low axillary/brachial    #GI  s/p ex-lap for evisceration, retention sutures placed  - Daily wound check   - c/w protonix daily  - c/w TF 41 ml/hr, 24 hrs    #renal  - BMP and I&O monitoring   - C/w iraheta for UOP monitoring   - Repletion of electrolytes PRN  - Lasix 40mg IV qd  - Diamox 250mg BID for metabolic alkalosis     #heme  - stable H/H, s/p 1 prbc transfusion 1/8  - SQH for dvt ppx  - b/l LE venous duplex to r/o DVT    #ID  spiked fevers in last 2 days, blood cultures sent, started on zosyn and vanco  - blood cultures growing enterococcus, wound culture in lab   - lines: myrtle, rt IJV (1/6), rt brachial A line, amaury, ET  -appreciate ID consult  -plan to switch A line and central line today   - TTE on Monday to r/o endocarditis     #endo  - hx RA on chronic prednisone, DM, hypothyroid  - Hydrocortisone 25 mg BID, do not titrate further down as this is nearly equivalent to home dose   - NPH 14u q6h while 22 hr continuous tube feeds continued, moderate dose corrective scale q6h  - Endo following, appreciate recs  - Synthroid 137mcg qhs    #dispo  -SICU  -GOC discussion with family and confirm HCP

## 2019-01-13 NOTE — PROGRESS NOTE ADULT - ASSESSMENT
63F w/ multiple respiratory comorbidities p/w SBO s/p ex-lap with findings of healthy bowel c/b evisceration of bowel s/p ex-lap with placement of retention sutures, now with worsening separation at skin edges requiring SICU level care including intubation with FiO2 of 50% and PEEP of 10    Plan  - Pain control: dilaudid 0.5mg IV push q4h PRN, oxy 5&10 via nasogatric tube q4h PRN, Tylenol 650mg suspension PO q6h, gabapentin 300mg via NGT  - NPO w/ tube feeds  - Monitor GI fxn   - Monitor respiratory status  - Follow long term care plan - will likely need tracheostomy  - DVT ppx: subQ heparin  - Appreciate SICU care

## 2019-01-13 NOTE — PROGRESS NOTE ADULT - ATTENDING COMMENTS
Critical Care Dx    Acute Respiratory failure with chronic underlying COPD, hypoxic/hypercapneic  -tolerating CPAP 10/5/50%  -P/F ratio stable  -Flu +, Tamiflu stopped after 5 days.   -trend ABG    Hyperglycemia  -Endocrine consult prefers NPH due to steroid timing.  Will trend levels, keep <180  -Steroids decreased but still higher than baseline home requirement   -con't tube feeds    Febrile, unclear source  -suspect either PNA or viral etiology.   -CT scan w/ PO and IV contrast did not reveal an obvious intra-abdominal source of infection  -slight fever this AM, will trend, fever workup.  -Central line will be changed however the patient is adamently refusing procedures as she feels we have been poking her too much. I personally talked with her and her family at bedside and relayed the importance of removing a possibly infected central line. She still refuses.   -ordered bilateral lower ext duplex    Hypothyroidism  -PO dose of synthroid     RA, COPD  -stress dose steroids were given baseline requirement of prednisone and now with surgical/inflammatory stress.  -keep steroid dose as is    At risk for malnutrition  -reaching goal tube feedings but has been underfed in post-surgical time period  -monitor prealbumin weekly     The patient is a critical care patient with life threatening respiratory instability in SICU.  I have personally interviewed and examined the patient, reviewed data and laboratory tests/x-rays and all pertinent electronic images.  The SICU team has a constant risk benefit analyzes discussion with the primary team, all consultants, House Staff and PA's on all decisions.  These diagnoses are unrelated to the surgical procedure noted.  Time involved in performance of separately billable procedures was not counted toward my critical care time. There is no overlap.    I spent 45 minutes in total providing critical care for the diagnoses, assessment and plan above.      I was physically present for the key portions of the evaluation and management (E/M) service provided.  I agree with the above history, physical, and plan which I have reviewed and edited where appropriate.     Ryan Bear MD  Acute and Critical Care Surgery .

## 2019-01-13 NOTE — PROGRESS NOTE ADULT - SUBJECTIVE AND OBJECTIVE BOX
SICU Daily Progress Note  =====================================================  Interval/Overnight Events:  Blood cultures now growing Enterococcus. ID consult obtained during the day, patient's UCx and wound culture are pending. Started on Zosyn and Vancomycin. Multiple attempts made during the day for peripheral access were unsuccessful.      63y Female hx of COPD presented initially with SBO, underwent surgery closed with 4 retention sutures, eviscerated on 12/30 and closed with 6 more retention sutures, now c/b multifocal pneumonia, flu and respiratory distress /hypoxia on continuous bipap. Patient temporarily transferred to RCU, but developed hypercapnia and respiratory distress requiring intubation 1/6 and subsequent transfer to SICU.     Allergies: No Known Allergies      MEDICATIONS:   --------------------------------------------------------------------------------------  Neurologic Medications  acetaminophen    Suspension .. 650 milliGRAM(s) Oral every 6 hours  dexmedetomidine Infusion 0.3 MICROgram(s)/kG/Hr IV Continuous <Continuous>  gabapentin   Solution 300 milliGRAM(s) Oral two times a day  HYDROmorphone  Injectable 0.5 milliGRAM(s) IV Push every 4 hours PRN Pain unrelieved by PO  oxyCODONE    Solution 10 milliGRAM(s) Enteral Tube every 4 hours PRN Severe Pain (7 - 10)  oxyCODONE    Solution 5 milliGRAM(s) Enteral Tube every 4 hours PRN Moderate Pain (4 - 6)    Respiratory Medications  ALBUTerol    90 MICROgram(s) HFA Inhaler 1 Puff(s) Inhalation every 6 hours  buDESOnide 160 MICROgram(s)/formoterol 4.5 MICROgram(s) Inhaler 2 Puff(s) Inhalation two times a day  sodium chloride 3%  Inhalation 4 milliLiter(s) Inhalation every 6 hours    Cardiovascular Medications  amLODIPine   Tablet 10 milliGRAM(s) Oral daily  furosemide   Injectable 40 milliGRAM(s) IV Push daily  metoprolol tartrate 25 milliGRAM(s) Enteral Tube two times a day    Gastrointestinal Medications  pantoprazole   Suspension 40 milliGRAM(s) Oral daily  potassium chloride  20 mEq/100 mL IVPB 20 milliEquivalent(s) IV Intermittent every 1 hour    Genitourinary Medications    Hematologic/Oncologic Medications  heparin  Injectable 5000 Unit(s) SubCutaneous every 8 hours  influenza   Vaccine 0.5 milliLiter(s) IntraMuscular once    Antimicrobial/Immunologic Medications  piperacillin/tazobactam IVPB. 3.375 Gram(s) IV Intermittent every 8 hours  vancomycin  IVPB        Endocrine/Metabolic Medications  dextrose 50% Injectable 25 milliLiter(s) IV Push once  hydrocortisone sodium succinate Injectable 25 milliGRAM(s) IV Push every 12 hours  insulin lispro (HumaLOG) corrective regimen sliding scale   SubCutaneous every 6 hours  insulin NPH human recombinant 14 Unit(s) SubCutaneous every 6 hours  levothyroxine 137 MICROGram(s) Oral daily  simvastatin 20 milliGRAM(s) Oral at bedtime    Topical/Other Medications  chlorhexidine 0.12% Liquid 15 milliLiter(s) Swish and Spit two times a day  chlorhexidine 4% Liquid 1 Application(s) Topical <User Schedule>    --------------------------------------------------------------------------------------    VITAL SIGNS, INS/OUTS (last 24 hours):  --------------------------------------------------------------------------------------  ((Insert SICU Vitals/Is+Os here))***  --------------------------------------------------------------------------------------    EXAM  NEUROLOGY  RASS: 0   Exam: Alert, following commands, calm     HEENT  Exam: Normocephalic, atraumatic, EOMI.      RESPIRATORY  Exam: Decreased to bilateral bases, Normal expansion  Mechanical Ventilation: Mode: AC, , RR 22, PEEP 10, FiO2 50%    CARDIOVASCULAR  Exam: S1, S2.  Regular rate and rhythm.    GI/NUTRITION  Exam: Abdomen with retention sutures in place, soft, ND  Current Diet:  Glucerna @ goal 41ml/hr x24hrs    VASCULAR  Exam: Extremities warm, pink, well-perfused.    MUSCULOSKELETAL  Exam: All extremities moving spontaneously without limitations.    SKIN  Exam: Good skin turgor, no skin breakdown.   METABOLIC/FLUIDS/ELECTROLYTES  potassium chloride  20 mEq/100 mL IVPB 20 milliEquivalent(s) IV Intermittent every 1 hour    HEMATOLOGIC  [x] VTE Prophylaxis: heparin  Injectable 5000 Unit(s) SubCutaneous every 8 hours    Transfusions:	[] PRBC	[] Platelets		[] FFP	[] Cryoprecipitate    INFECTIOUS DISEASE  Antimicrobials/Immunologic Medications:  influenza   Vaccine 0.5 milliLiter(s) IntraMuscular once  piperacillin/tazobactam IVPB. 3.375 Gram(s) IV Intermittent every 8 hours  vancomycin  IVPB        Day # 1    Tubes/Lines/Drains   [x] Peripheral IV  [x] Central Venous Line     	[x] R	[] L	[x] IJ	[] Fem	[] SC	Date Placed:   [x] Arterial Line		[x] R	[] L	[] Fem	[] Rad	[] Ax	Date Placed:   [] PICC		[] Midline		[] Mediport  [x] Urinary Catheter		Date Placed:   [x] Necessity of urinary, arterial, and venous catheters discussed    LABS  --------------------------------------------------------------------------------------  ((Insert SICU Labs here))***  --------------------------------------------------------------------------------------    OTHER LABORATORY:     IMAGING STUDIES:   CXR: SICU Daily Progress Note  =====================================================  Interval/Overnight Events:  Blood cultures now growing Enterococcus. ID consult obtained during the day, patient's UCx and wound culture are pending. Started on Zosyn and Vancomycin. Multiple attempts made during the day for peripheral access were unsuccessful.      HPI  63y Female hx of COPD presented initially with SBO, underwent surgery closed with 4 retention sutures, eviscerated on 12/30 and closed with 6 more retention sutures, now c/b multifocal pneumonia, flu and respiratory distress /hypoxia on continuous bipap. Patient temporarily transferred to RCU, but developed hypercapnia and respiratory distress requiring intubation 1/6 and subsequent transfer to SICU. course complicated by fevers and blood culture positive for enterococci.     Allergies: No Known Allergies      MEDICATIONS:   --------------------------------------------------------------------------------------                  Genitourinary Medications                  Topical/Other Medications  chlorhexidine 0.12% Liquid 15 milliLiter(s) Swish and Spit two times a day  chlorhexidine 4% Liquid 1 Application(s) Topical <User Schedule>    --------------------------------------------------------------------------------------    VITAL SIGNS, INS/OUTS (last 24 hours):  --------------------------------------------------------------------------------------  ((Insert SICU Vitals/Is+Os here))***  --------------------------------------------------------------------------------------    EXAM  NEUROLOGY  RASS: 0   Exam: Alert, following commands, calm   Neurologic Medications  acetaminophen    Suspension .. 650 milliGRAM(s) Oral every 6 hours  dexmedetomidine Infusion 0.3 MICROgram(s)/kG/Hr IV Continuous <Continuous>  gabapentin   Solution 300 milliGRAM(s) Oral two times a day  HYDROmorphone  Injectable 0.5 milliGRAM(s) IV Push every 4 hours PRN Pain unrelieved by PO  oxyCODONE    Solution 10 milliGRAM(s) Enteral Tube every 4 hours PRN Severe Pain (7 - 10)  oxyCODONE    Solution 5 milliGRAM(s) Enteral Tube every 4 hours PRN Moderate Pain (4 - 6)    HEENT  Exam: Normocephalic, atraumatic, EOMI.      RESPIRATORY  Exam: Decreased to bilateral bases, Normal expansion  Mechanical Ventilation: Mode: AC, , RR 22, PEEP 10, FiO2 50%  Respiratory Medications  ALBUTerol    90 MICROgram(s) HFA Inhaler 1 Puff(s) Inhalation every 6 hours  buDESOnide 160 MICROgram(s)/formoterol 4.5 MICROgram(s) Inhaler 2 Puff(s) Inhalation two times a day  sodium chloride 3%  Inhalation 4 milliLiter(s) Inhalation every 6 hours    CARDIOVASCULAR  Exam: S1, S2.  Regular rate and rhythm.  Cardiovascular Medications  amLODIPine   Tablet 10 milliGRAM(s) Oral daily  furosemide   Injectable 40 milliGRAM(s) IV Push daily  metoprolol tartrate 25 milliGRAM(s) Enteral Tube two times a day    GI/NUTRITION  Exam: Abdomen with retention sutures in place, soft, ND  Current Diet:  Glucerna @ goal 44ml/hr for 22 hrs   Gastrointestinal Medications  pantoprazole   Suspension 40 milliGRAM(s) Oral daily  potassium chloride  20 mEq/100 mL IVPB 20 milliEquivalent(s) IV Intermittent every 1 hour    VASCULAR  Exam: Extremities warm, pink, well-perfused.    MUSCULOSKELETAL  Exam: All extremities moving spontaneously without limitations.    SKIN  Exam: Good skin turgor, no skin breakdown.     METABOLIC/FLUIDS/ELECTROLYTES  potassium chloride  20 mEq/100 mL IVPB 20 milliEquivalent(s) IV Intermittent every 1 hour    Endo    CAPILLARY BLOOD GLUCOSE      POCT Blood Glucose.: 235 mg/dL (13 Jan 2019 05:52)  POCT Blood Glucose.: 123 mg/dL (12 Jan 2019 18:49)  POCT Blood Glucose.: 78 mg/dL (12 Jan 2019 12:27)    Endocrine/Metabolic Medications  dextrose 50% Injectable 25 milliLiter(s) IV Push once  hydrocortisone sodium succinate Injectable 25 milliGRAM(s) IV Push every 12 hours  insulin lispro (HumaLOG) corrective regimen sliding scale   SubCutaneous every 6 hours  insulin NPH human recombinant 14 Unit(s) SubCutaneous every 6 hours  levothyroxine 137 MICROGram(s) Oral daily  simvastatin 20 milliGRAM(s) Oral at bedtime      HEMATOLOGIC  [x] VTE Prophylaxis: heparin  Injectable 5000 Unit(s) SubCutaneous every 8 hours    Transfusions:	[] PRBC	[] Platelets		[] FFP	[] Cryoprecipitate    INFECTIOUS DISEASE  Antimicrobials/Immunologic Medications:  influenza   Vaccine 0.5 milliLiter(s) IntraMuscular once  piperacillin/tazobactam IVPB. 3.375 Gram(s) IV Intermittent every 8 hours  vancomycin  IVPB        Day # 1    cultures:   BLOOD PERIPHERAL  01-11 @ 21:31 --    ***Blood Panel PCR results on this specimen are available  approximately 3 hours after the Gram stain result***  Gram stain, PCR, and/or culture results may not always  correspond due to difference in methodologies  ------------------------------------------------------------  This PCR assay was performed using [employer].  The  following targets are tested for:  Enterococcus, vancomycin  resistant enterococci, Listeria monocytogenes,  coagulase  negative staphylococci, S. aureus, methicillin resistant S.  aureus, Streptococcus agalactiae (Group B), S. pneumoniae,  S. pyogenes (Group A), Acinetobacter baumannii, Enterobacter  cloacae, E. coli, Klebsiella oxytoca, K. pneumoniae, Proteus  sp., Serratia marcescens, Haemophilus influenzae, Neisseria  meningitidis, Pseudomonas aeruginosa, Candida albicans, C.  glabrata, C. krusei, C. parapsilosis, C. tropicalis and the  KPC resistance gene.  **NOTE: Due to technical problems, Proteus sp. will NOT be  reported as part of the BCID paneluntil further notice.  BLOOD CULTURE PCR  Gram Pos Cocci to be IDed      BLOOD VENOUS  01-11 @ 20:25 --    NO ORGANISMS ISOLATED  NO ORGANISMS ISOLATED AT 24 HOURS  --      BRONCHIAL LAVAGE  01-09 @ 12:31 --  --  --      SPUTUM  01-02 @ 13:11 --  --  --      BLOOD  01-01 @ 08:47 --    NO ORGANISMS ISOLATED  --      BLOOD  12-31 @ 20:13 --    NO ORGANISMS ISOLATED  --      URINE CATHETER  12-20 @ 19:00 --  --  Escherichia coli      BLOOD PERIPHERAL  12-20 @ 16:39 --    NO ORGANISMS ISOLATED  --    Tubes/Lines/Drains   [] Peripheral IV  [x] Central Venous Line     	[x] R	[] L	[x] IJ	[] Fem	[] SC	Date Placed:   [x] Arterial Line		[x] R brachial	[] L	[] Fem	[] Rad	[] Ax	Date Placed:   [] PICC		[] Midline		[] Mediport  [x] Urinary Catheter		Date Placed:   [ ]rectal pouch   [x] Necessity of urinary, arterial, and venous catheters discussed    LABS  --------------------------------------------------------------------------------------  ((Insert SICU Labs here))***  --------------------------------------------------------------------------------------    OTHER LABORATORY:     IMAGING STUDIES:   CXR: SICU Daily Progress Note  =====================================================  Interval/Overnight Events:  Blood cultures now growing Enterococcus. ID consult obtained during the day, patient's UCx and wound culture are pending. Started on Zosyn and Vancomycin. Multiple attempts made during the day for peripheral access were unsuccessful.      HPI  63y Female hx of COPD presented initially with SBO, underwent surgery closed with 4 retention sutures, eviscerated on 12/30 and closed with 6 more retention sutures, now c/b multifocal pneumonia, flu and respiratory distress /hypoxia on continuous bipap. Patient temporarily transferred to RCU, but developed hypercapnia and respiratory distress requiring intubation 1/6 and subsequent transfer to SICU. course complicated by fevers and blood culture positive for enterococci.     Allergies: No Known Allergies    EXAM  NEUROLOGY  Exam: Alert, following commands, calm , RASS: 0     Neurologic Medications  acetaminophen    Suspension .. 650 milliGRAM(s) Oral every 6 hours  dexmedetomidine Infusion 0.3 MICROgram(s)/kG/Hr IV Continuous <Continuous>  gabapentin   Solution 300 milliGRAM(s) Oral two times a day  HYDROmorphone  Injectable 0.5 milliGRAM(s) IV Push every 4 hours PRN Pain unrelieved by PO  oxyCODONE    Solution 10 milliGRAM(s) Enteral Tube every 4 hours PRN Severe Pain (7 - 10)  oxyCODONE    Solution 5 milliGRAM(s) Enteral Tube every 4 hours PRN Moderate Pain (4 - 6)    HEENT  Exam: Normocephalic, atraumatic, EOMI.      RESPIRATORY    ICU Vital Signs Last 24 Hrs  T(C): 37.7 (13 Jan 2019 04:00), Max: 38.1 (12 Jan 2019 12:00)  T(F): 99.9 (13 Jan 2019 04:00), Max: 100.6 (12 Jan 2019 12:00)    RR: 26 (13 Jan 2019 07:00) (21 - 26)  SpO2: 99% (13 Jan 2019 10:33) (92% - 99%)    Exam: Decreased to bilateral bases, Normal expansion  Mechanical Ventilation: Mode: AC, , RR 22, PEEP 10, FiO2 50%  Respiratory Medications  ALBUTerol    90 MICROgram(s) HFA Inhaler 1 Puff(s) Inhalation every 6 hours  buDESOnide 160 MICROgram(s)/formoterol 4.5 MICROgram(s) Inhaler 2 Puff(s) Inhalation two times a day  sodium chloride 3%  Inhalation 4 milliLiter(s) Inhalation every 6 hours    CARDIOVASCULAR    HR: 97 (13 Jan 2019 10:33) (58 - 97)  BP: --  BP(mean): --  ABP: 113/61 (13 Jan 2019 07:00) (90/52 - 138/70)  ABP(mean): 82 (13 Jan 2019 07:00) (68 - 103)  Exam: S1, S2.  Regular rate and rhythm.  Cardiovascular Medications  amLODIPine   Tablet 10 milliGRAM(s) Oral daily  furosemide   Injectable 40 milliGRAM(s) IV Push daily  metoprolol tartrate 25 milliGRAM(s) Enteral Tube two times a day    GI/NUTRITION  Exam: Abdomen with retention sutures in place, soft, ND  Current Diet:  Glucerna @ goal 44ml/hr for 22 hrs   Gastrointestinal Medications  pantoprazole   Suspension 40 milliGRAM(s) Oral daily  potassium chloride  20 mEq/100 mL IVPB 20 milliEquivalent(s) IV Intermittent every 1 hour    VASCULAR  Exam: Extremities warm, pink, well-perfused.    MUSCULOSKELETAL  Exam: All extremities moving spontaneously without limitations.    SKIN  Exam: Good skin turgor, no skin breakdown.   Topical/Other Medications  chlorhexidine 0.12% Liquid 15 milliLiter(s) Swish and Spit two times a day  chlorhexidine 4% Liquid 1 Application(s) Topical <User Schedule>    METABOLIC/FLUIDS/ELECTROLYTES  potassium chloride  20 mEq/100 mL IVPB 20 milliEquivalent(s) IV Intermittent every 1 hour    Endo    CAPILLARY BLOOD GLUCOSE      POCT Blood Glucose.: 235 mg/dL (13 Jan 2019 05:52)  POCT Blood Glucose.: 123 mg/dL (12 Jan 2019 18:49)  POCT Blood Glucose.: 78 mg/dL (12 Jan 2019 12:27)    Endocrine/Metabolic Medications  dextrose 50% Injectable 25 milliLiter(s) IV Push once  hydrocortisone sodium succinate Injectable 25 milliGRAM(s) IV Push every 12 hours  insulin lispro (HumaLOG) corrective regimen sliding scale   SubCutaneous every 6 hours  insulin NPH human recombinant 14 Unit(s) SubCutaneous every 6 hours  levothyroxine 137 MICROGram(s) Oral daily  simvastatin 20 milliGRAM(s) Oral at bedtime      HEMATOLOGIC  [x] VTE Prophylaxis: heparin  Injectable 5000 Unit(s) SubCutaneous every 8 hours    Transfusions:	[] PRBC	[] Platelets		[] FFP	[] Cryoprecipitate    INFECTIOUS DISEASE  Antimicrobials/Immunologic Medications:  influenza   Vaccine 0.5 milliLiter(s) IntraMuscular once  piperacillin/tazobactam IVPB. 3.375 Gram(s) IV Intermittent every 8 hours  vancomycin  IVPB        Day # 1    cultures:   BLOOD PERIPHERAL  01-11 @ 21:31 --    ***Blood Panel PCR results on this specimen are available  approximately 3 hours after the Gram stain result***  Gram stain, PCR, and/or culture results may not always  correspond due to difference in methodologies  ------------------------------------------------------------  This PCR assay was performed using [employer].  The  following targets are tested for:  Enterococcus, vancomycin  resistant enterococci, Listeria monocytogenes,  coagulase  negative staphylococci, S. aureus, methicillin resistant S.  aureus, Streptococcus agalactiae (Group B), S. pneumoniae,  S. pyogenes (Group A), Acinetobacter baumannii, Enterobacter  cloacae, E. coli, Klebsiella oxytoca, K. pneumoniae, Proteus  sp., Serratia marcescens, Haemophilus influenzae, Neisseria  meningitidis, Pseudomonas aeruginosa, Candida albicans, C.  glabrata, C. krusei, C. parapsilosis, C. tropicalis and the  KPC resistance gene.  **NOTE: Due to technical problems, Proteus sp. will NOT be  reported as part of the BCID paneluntil further notice.  BLOOD CULTURE PCR  Gram Pos Cocci to be IDed      BLOOD VENOUS  01-11 @ 20:25 --    NO ORGANISMS ISOLATED  NO ORGANISMS ISOLATED AT 24 HOURS  --      BRONCHIAL LAVAGE  01-09 @ 12:31 --  --  --      SPUTUM  01-02 @ 13:11 --  --  --      BLOOD  01-01 @ 08:47 --    NO ORGANISMS ISOLATED  --      BLOOD  12-31 @ 20:13 --    NO ORGANISMS ISOLATED  --      URINE CATHETER  12-20 @ 19:00 --  --  Escherichia coli      BLOOD PERIPHERAL  12-20 @ 16:39 --    NO ORGANISMS ISOLATED  --    Tubes/Lines/Drains   [] Peripheral IV  [x] Central Venous Line     	[x] R	[] L	[x] IJ	[] Fem	[] SC	Date Placed:   [x] Arterial Line		[x] R brachial	[] L	[] Fem	[] Rad	[] Ax	Date Placed:   [] PICC		[] Midline		[] Mediport  [x] Urinary Catheter		Date Placed:   [ ]rectal pouch   [x] Necessity of urinary, arterial, and venous catheters discussed    LABS  --------------------------------------------------------------------------------------  ((Insert SICU Labs here))***  --------------------------------------------------------------------------------------    OTHER LABORATORY:     IMAGING STUDIES:   CXR: SICU Daily Progress Note  =====================================================  Interval/Overnight Events:  Blood cultures now growing Enterococcus. ID consult obtained during the day, patient's UCx and wound culture are pending. Started on Zosyn and Vancomycin. Multiple attempts made during the day for peripheral access were unsuccessful.      HPI  63y Female hx of COPD presented initially with SBO, underwent surgery closed with 4 retention sutures, eviscerated on 12/30 and closed with 6 more retention sutures, now c/b multifocal pneumonia, flu and respiratory distress /hypoxia on continuous bipap. Patient temporarily transferred to RCU, but developed hypercapnia and respiratory distress requiring intubation 1/6 and subsequent transfer to SICU. course complicated by fevers and blood culture positive for enterococci.     Allergies: No Known Allergies    EXAM  NEUROLOGY  Exam: Alert, following commands, calm , RASS: 0     Neurologic Medications  acetaminophen    Suspension .. 650 milliGRAM(s) Oral every 6 hours  dexmedetomidine Infusion 0.3 MICROgram(s)/kG/Hr IV Continuous <Continuous>  gabapentin   Solution 300 milliGRAM(s) Oral two times a day  HYDROmorphone  Injectable 0.5 milliGRAM(s) IV Push every 4 hours PRN Pain unrelieved by PO  oxyCODONE    Solution 10 milliGRAM(s) Enteral Tube every 4 hours PRN Severe Pain (7 - 10)  oxyCODONE    Solution 5 milliGRAM(s) Enteral Tube every 4 hours PRN Moderate Pain (4 - 6)    HEENT  Exam: Normocephalic, atraumatic, EOMI.      RESPIRATORY    ICU Vital Signs Last 24 Hrs  RR: 26 (13 Jan 2019 07:00) (21 - 26)  SpO2: 99% (13 Jan 2019 10:33) (92% - 99%)    Exam: Decreased to bilateral bases, Normal expansion    Mechanical Ventilation: Mode: AC, , RR 22, PEEP 10, FiO2 50%    ABG - ( 13 Jan 2019 03:11 )  pH, Arterial: 7.40  pH, Blood: x     /  pCO2: 43    /  pO2: 72    / HCO3: 26    / Base Excess: 1.5   /  SaO2: 92.7      Respiratory Medications  ALBUTerol    90 MICROgram(s) HFA Inhaler 1 Puff(s) Inhalation every 6 hours  buDESOnide 160 MICROgram(s)/formoterol 4.5 MICROgram(s) Inhaler 2 Puff(s) Inhalation two times a day  sodium chloride 3%  Inhalation 4 milliLiter(s) Inhalation every 6 hours    CARDIOVASCULAR    HR: 97 (13 Jan 2019 10:33) (58 - 97)  ABP: 113/61 (13 Jan 2019 07:00) (90/52 - 138/70)  ABP(mean): 82 (13 Jan 2019 07:00) (68 - 103)    Exam: S1, S2.  Regular rate and rhythm.    Cardiovascular Medications  amLODIPine   Tablet 10 milliGRAM(s) Oral daily  furosemide   Injectable 40 milliGRAM(s) IV Push daily  metoprolol tartrate 25 milliGRAM(s) Enteral Tube two times a day    GI/NUTRITION  Exam: Abdomen with retention sutures in place, soft, ND    Current Diet:  Glucerna @ goal 44ml/hr for 22 hrs     Gastrointestinal Medications  pantoprazole   Suspension 40 milliGRAM(s) Oral daily  potassium chloride  20 mEq/100 mL IVPB 20 milliEquivalent(s) IV Intermittent every 1 hour    MUSCULOSKELETAL  Exam: All extremities moving spontaneously without limitations.    SKIN  Exam: Good skin turgor, no skin breakdown.     Topical/Other Medications  chlorhexidine 0.12% Liquid 15 milliLiter(s) Swish and Spit two times a day  chlorhexidine 4% Liquid 1 Application(s) Topical <User Schedule>    METABOLIC/FLUIDS/ELECTROLYTES  potassium chloride  20 mEq/100 mL IVPB 20 milliEquivalent(s) IV Intermittent every 1 hour    ENDOCRINOLOGY    CAPILLARY BLOOD GLUCOSE  POCT Blood Glucose.: 235 mg/dL (13 Jan 2019 05:52)  POCT Blood Glucose.: 123 mg/dL (12 Jan 2019 18:49)  POCT Blood Glucose.: 78 mg/dL (12 Jan 2019 12:27)    Endocrine/Metabolic Medications  dextrose 50% Injectable 25 milliLiter(s) IV Push once  hydrocortisone sodium succinate Injectable 25 milliGRAM(s) IV Push every 12 hours  insulin lispro (HumaLOG) corrective regimen sliding scale   SubCutaneous every 6 hours  insulin NPH human recombinant 14 Unit(s) SubCutaneous every 6 hours  levothyroxine 137 MICROGram(s) Oral daily  simvastatin 20 milliGRAM(s) Oral at bedtime    HEMATOLOGIC  [x] VTE Prophylaxis: heparin  Injectable 5000 Unit(s) SubCutaneous every 8 hours                        8.5    9.97  )-----------( 442      ( 13 Jan 2019 03:11 )             27.5     Transfusions:	[] PRBC	[] Platelets		[] FFP	[] Cryoprecipitate    INFECTIOUS DISEASE    T(C): 37.7 (13 Jan 2019 04:00), Max: 38.1 (12 Jan 2019 12:00)  T(F): 99.9 (13 Jan 2019 04:00), Max: 100.6 (12 Jan 2019 12:00)    Antimicrobials/Immunologic Medications:  influenza   Vaccine 0.5 milliLiter(s) IntraMuscular once Day # 1  piperacillin/tazobactam IVPB. 3.375 Gram(s) IV Intermittent every 8 hours Day # 1  vancomycin  IVPB        cultures:   BLOOD PERIPHERAL  01-11 @ 21:31 --    ***Blood Panel PCR results on this specimen are available  approximately 3 hours after the Gram stain result***  Gram stain, PCR, and/or culture results may not always  correspond due to difference in methodologies  ------------------------------------------------------------  This PCR assay was performed using [employer].  The  following targets are tested for:  Enterococcus, vancomycin  resistant enterococci, Listeria monocytogenes,  coagulase  negative staphylococci, S. aureus, methicillin resistant S.  aureus, Streptococcus agalactiae (Group B), S. pneumoniae,  S. pyogenes (Group A), Acinetobacter baumannii, Enterobacter  cloacae, E. coli, Klebsiella oxytoca, K. pneumoniae, Proteus  sp., Serratia marcescens, Haemophilus influenzae, Neisseria  meningitidis, Pseudomonas aeruginosa, Candida albicans, C.  glabrata, C. krusei, C. parapsilosis, C. tropicalis and the  KPC resistance gene.  **NOTE: Due to technical problems, Proteus sp. will NOT be  reported as part of the BCID panel until further notice.  BLOOD CULTURE PCR  Gram Pos Cocci to be IDed      BLOOD VENOUS  01-11 @ 20:25 --    NO ORGANISMS ISOLATED  NO ORGANISMS ISOLATED AT 24 HOURS  --    BRONCHIAL LAVAGE  01-09 @ 12:31 --  --  --    Tubes/Lines/Drains   [] Peripheral IV  [x] Central Venous Line     	[x] R	[] L	[x] IJ	[] Fem	[] SC	Date Placed:   [x] Arterial Line		[x] R brachial	[] L	[] Fem	[] Rad	[] Ax	Date Placed:   [] PICC		[] Midline		[] Mediport  [x] Urinary Catheter		Date Placed:   [x ]rectal pouch   [x] Necessity of urinary, arterial, and venous catheters discussed          IMAGING STUDIES:   CXR: < from: Xray Chest 1 View- PORTABLE-Urgent (01.12.19 @ 07:15) >  IMPRESSION:  Intubated with ETT tip at a satisfactory distance from the corwin.   Feeding tube again seen extending below diaphragm into abdomen with   distal tip not imaged.    Right IJ central line remains in place with tip in SVC region.    Unchanged ill-defined left basilar increased markings and patchy opacity   with questionable small left pleural effusion.    No gross right pleural effusion. Grossly clear remaining visualized   lungs. No pneumothorax.    Stable cardiac and mediastinal silhouettes.    Remainder of exam unchanged.    < end of copied text >

## 2019-01-13 NOTE — PROGRESS NOTE ADULT - ASSESSMENT
63 year old female with RA immunosuppressed from chronic steroids, underlying DM, presented on 12/15 with SBO.  S/p laparotomy and subsequent revision, now with respiratory failure and sepsis due to an enterococcal bacteremia.  Course/Infection complicated by underlying DM/ immuno suppression. High risk for complications.    1) Enterococcal bacteremia: Enterococcus in the blood is most often form a , abdominal, wound or line infection.   the patient has risk for all of the above.    Recent C ta/p without abscess but concern for midline wound infection  -Wound culture testing  -Check a urine Culture  -Line change when feasible (line odes not appear infected, pt without other access at this time)    -Repeat blood culture tomorrow  -Check echo  -Continue vancomycin (check through before 4th dose)    2) Abdominal Wound: retention sutures in place  Some drainage from lower pole  No abscess on CT    Wound Care  Continue vanco/ zosyn to  cover gram negative/anerobe  as well-    3) Immunosuppressed: due ot steroid use  Impairs wound healing    4) DM: glycemic control 63 year old female with RA immunosuppressed from chronic steroids, underlying DM, presented on 12/15 with SBO.  S/p laparotomy and subsequent revision, now with respiratory failure and sepsis due to an enterococcal bacteremia.  Course/Infection complicated by underlying DM/ immuno suppression. High risk for complications.    Critically ill with Respiratory failure    1) Enterococcal bacteremia: Enterococcus in the blood is most often form a , abdominal, wound or line infection.   the patient has risk for all of the above.    Recent C ta/p without abscess but concern for midline wound infection  -Wound culture testing  -Check a urine Culture  -Line change when feasible (line odes not appear infected, pt without other access at this time)    -Repeat blood culture tomorrow  -Check echo  -Continue vancomycin (check through before 4th dose)    2) Abdominal Wound: retention sutures in place  Some drainage from lower pole  No abscess on CT    Wound Care  Continue vanco/ zosyn to  cover gram negative/anerobe  as well-    3) Immunosuppressed: due ot steroid use  Impairs wound healing    4) DM: glycemic control

## 2019-01-14 LAB
-  AMPICILLIN: SIGNIFICANT CHANGE UP
-  GENTAMICIN 500: SIGNIFICANT CHANGE UP
-  STREPTOMYCIN 1000: SIGNIFICANT CHANGE UP
-  VANCOMYCIN: SIGNIFICANT CHANGE UP
ANION GAP SERPL CALC-SCNC: 14 MEQ/L — SIGNIFICANT CHANGE UP (ref 7–14)
BACTERIA BLD CULT: SIGNIFICANT CHANGE UP
BASE EXCESS BLDA CALC-SCNC: 3.8 MMOL/L — SIGNIFICANT CHANGE UP
BUN SERPL-MCNC: 18 MG/DL — SIGNIFICANT CHANGE UP (ref 7–23)
CA-I BLD-SCNC: 1.08 MMOL/L — SIGNIFICANT CHANGE UP (ref 1.03–1.23)
CA-I BLDA-SCNC: 1.23 MMOL/L — SIGNIFICANT CHANGE UP (ref 1.15–1.29)
CALCIUM SERPL-MCNC: 8.3 MG/DL — LOW (ref 8.4–10.5)
CHLORIDE SERPL-SCNC: 105 MMOL/L — SIGNIFICANT CHANGE UP (ref 98–107)
CO2 SERPL-SCNC: 24 MMOL/L — SIGNIFICANT CHANGE UP (ref 22–31)
CREAT SERPL-MCNC: 1.12 MG/DL — SIGNIFICANT CHANGE UP (ref 0.5–1.3)
ENTEROCOC DNA BLD POS QL NAA+NON-PROBE: SIGNIFICANT CHANGE UP
GLUCOSE BLDA-MCNC: 96 MG/DL — SIGNIFICANT CHANGE UP (ref 70–99)
GLUCOSE BLDC GLUCOMTR-MCNC: 103 MG/DL — HIGH (ref 70–99)
GLUCOSE BLDC GLUCOMTR-MCNC: 141 MG/DL — HIGH (ref 70–99)
GLUCOSE BLDC GLUCOMTR-MCNC: 276 MG/DL — HIGH (ref 70–99)
GLUCOSE SERPL-MCNC: 98 MG/DL — SIGNIFICANT CHANGE UP (ref 70–99)
HCO3 BLDA-SCNC: 28 MMOL/L — HIGH (ref 22–26)
HCT VFR BLD CALC: 23.4 % — LOW (ref 34.5–45)
HCT VFR BLD CALC: 27.2 % — LOW (ref 34.5–45)
HCT VFR BLDA CALC: 23.1 % — LOW (ref 34.5–46.5)
HGB BLD-MCNC: 7.1 G/DL — LOW (ref 11.5–15.5)
HGB BLD-MCNC: 8.3 G/DL — LOW (ref 11.5–15.5)
HGB BLDA-MCNC: 7.4 G/DL — LOW (ref 11.5–15.5)
LACTATE BLDA-SCNC: 2.1 MMOL/L — HIGH (ref 0.5–2)
MAGNESIUM SERPL-MCNC: 2 MG/DL — SIGNIFICANT CHANGE UP (ref 1.6–2.6)
MCHC RBC-ENTMCNC: 29.8 PG — SIGNIFICANT CHANGE UP (ref 27–34)
MCHC RBC-ENTMCNC: 30 PG — SIGNIFICANT CHANGE UP (ref 27–34)
MCHC RBC-ENTMCNC: 30.3 % — LOW (ref 32–36)
MCHC RBC-ENTMCNC: 30.5 % — LOW (ref 32–36)
MCV RBC AUTO: 98.2 FL — SIGNIFICANT CHANGE UP (ref 80–100)
MCV RBC AUTO: 98.3 FL — SIGNIFICANT CHANGE UP (ref 80–100)
METHOD TYPE: SIGNIFICANT CHANGE UP
NRBC # FLD: 0.07 K/UL — LOW (ref 25–125)
NRBC # FLD: 0.08 K/UL — LOW (ref 25–125)
PCO2 BLDA: 41 MMHG — SIGNIFICANT CHANGE UP (ref 32–48)
PH BLDA: 7.45 PH — SIGNIFICANT CHANGE UP (ref 7.35–7.45)
PHOSPHATE SERPL-MCNC: 3.5 MG/DL — SIGNIFICANT CHANGE UP (ref 2.5–4.5)
PLATELET # BLD AUTO: 362 K/UL — SIGNIFICANT CHANGE UP (ref 150–400)
PLATELET # BLD AUTO: 409 K/UL — HIGH (ref 150–400)
PMV BLD: 9.6 FL — SIGNIFICANT CHANGE UP (ref 7–13)
PMV BLD: 9.8 FL — SIGNIFICANT CHANGE UP (ref 7–13)
PO2 BLDA: 93 MMHG — SIGNIFICANT CHANGE UP (ref 83–108)
POTASSIUM BLDA-SCNC: 2.9 MMOL/L — LOW (ref 3.4–4.5)
POTASSIUM SERPL-MCNC: 3.1 MMOL/L — LOW (ref 3.5–5.3)
POTASSIUM SERPL-SCNC: 3.1 MMOL/L — LOW (ref 3.5–5.3)
PREALB SERPL-MCNC: 20 MG/DL — SIGNIFICANT CHANGE UP (ref 20–40)
RBC # BLD: 2.38 M/UL — LOW (ref 3.8–5.2)
RBC # BLD: 2.77 M/UL — LOW (ref 3.8–5.2)
RBC # FLD: 16.4 % — HIGH (ref 10.3–14.5)
RBC # FLD: 16.5 % — HIGH (ref 10.3–14.5)
SAO2 % BLDA: 97 % — SIGNIFICANT CHANGE UP (ref 95–99)
SODIUM BLDA-SCNC: 142 MMOL/L — SIGNIFICANT CHANGE UP (ref 136–146)
SODIUM SERPL-SCNC: 143 MMOL/L — SIGNIFICANT CHANGE UP (ref 135–145)
SPECIMEN SOURCE: SIGNIFICANT CHANGE UP
VANCOMYCIN TROUGH SERPL-MCNC: 20.3 UG/ML — HIGH (ref 10–20)
WBC # BLD: 11.25 K/UL — HIGH (ref 3.8–10.5)
WBC # BLD: 8.68 K/UL — SIGNIFICANT CHANGE UP (ref 3.8–10.5)
WBC # FLD AUTO: 11.25 K/UL — HIGH (ref 3.8–10.5)
WBC # FLD AUTO: 8.68 K/UL — SIGNIFICANT CHANGE UP (ref 3.8–10.5)

## 2019-01-14 PROCEDURE — 99233 SBSQ HOSP IP/OBS HIGH 50: CPT

## 2019-01-14 PROCEDURE — 74018 RADEX ABDOMEN 1 VIEW: CPT | Mod: 26

## 2019-01-14 PROCEDURE — 71045 X-RAY EXAM CHEST 1 VIEW: CPT | Mod: 26

## 2019-01-14 PROCEDURE — 93970 EXTREMITY STUDY: CPT | Mod: 26

## 2019-01-14 PROCEDURE — 99232 SBSQ HOSP IP/OBS MODERATE 35: CPT

## 2019-01-14 RX ORDER — VANCOMYCIN HCL 1 G
1000 VIAL (EA) INTRAVENOUS EVERY 24 HOURS
Qty: 0 | Refills: 0 | Status: DISCONTINUED | OUTPATIENT
Start: 2019-01-15 | End: 2019-01-21

## 2019-01-14 RX ORDER — INSULIN LISPRO 100/ML
VIAL (ML) SUBCUTANEOUS EVERY 6 HOURS
Qty: 0 | Refills: 0 | Status: DISCONTINUED | OUTPATIENT
Start: 2019-01-14 | End: 2019-01-15

## 2019-01-14 RX ORDER — POTASSIUM CHLORIDE 20 MEQ
10 PACKET (EA) ORAL
Qty: 0 | Refills: 0 | Status: DISCONTINUED | OUTPATIENT
Start: 2019-01-14 | End: 2019-01-14

## 2019-01-14 RX ORDER — POTASSIUM CHLORIDE 20 MEQ
20 PACKET (EA) ORAL
Qty: 0 | Refills: 0 | Status: COMPLETED | OUTPATIENT
Start: 2019-01-14 | End: 2019-01-14

## 2019-01-14 RX ORDER — SODIUM CHLORIDE 9 MG/ML
500 INJECTION, SOLUTION INTRAVENOUS ONCE
Qty: 0 | Refills: 0 | Status: COMPLETED | OUTPATIENT
Start: 2019-01-14 | End: 2019-01-14

## 2019-01-14 RX ORDER — POTASSIUM CHLORIDE 20 MEQ
20 PACKET (EA) ORAL ONCE
Qty: 0 | Refills: 0 | Status: DISCONTINUED | OUTPATIENT
Start: 2019-01-14 | End: 2019-01-14

## 2019-01-14 RX ADMIN — OXYCODONE HYDROCHLORIDE 10 MILLIGRAM(S): 5 TABLET ORAL at 05:54

## 2019-01-14 RX ADMIN — Medication 50 MILLIEQUIVALENT(S): at 10:15

## 2019-01-14 RX ADMIN — HUMAN INSULIN 14 UNIT(S): 100 INJECTION, SUSPENSION SUBCUTANEOUS at 12:34

## 2019-01-14 RX ADMIN — HUMAN INSULIN 14 UNIT(S): 100 INJECTION, SUSPENSION SUBCUTANEOUS at 00:09

## 2019-01-14 RX ADMIN — SODIUM CHLORIDE 4 MILLILITER(S): 9 INJECTION INTRAMUSCULAR; INTRAVENOUS; SUBCUTANEOUS at 04:10

## 2019-01-14 RX ADMIN — OXYCODONE HYDROCHLORIDE 10 MILLIGRAM(S): 5 TABLET ORAL at 15:45

## 2019-01-14 RX ADMIN — OXYCODONE HYDROCHLORIDE 10 MILLIGRAM(S): 5 TABLET ORAL at 20:30

## 2019-01-14 RX ADMIN — Medication 25 MILLIGRAM(S): at 18:41

## 2019-01-14 RX ADMIN — HEPARIN SODIUM 5000 UNIT(S): 5000 INJECTION INTRAVENOUS; SUBCUTANEOUS at 00:10

## 2019-01-14 RX ADMIN — OXYCODONE HYDROCHLORIDE 10 MILLIGRAM(S): 5 TABLET ORAL at 11:35

## 2019-01-14 RX ADMIN — SODIUM CHLORIDE 4 MILLILITER(S): 9 INJECTION INTRAMUSCULAR; INTRAVENOUS; SUBCUTANEOUS at 22:55

## 2019-01-14 RX ADMIN — ALBUTEROL 1 PUFF(S): 90 AEROSOL, METERED ORAL at 16:29

## 2019-01-14 RX ADMIN — HEPARIN SODIUM 5000 UNIT(S): 5000 INJECTION INTRAVENOUS; SUBCUTANEOUS at 06:46

## 2019-01-14 RX ADMIN — ALBUTEROL 1 PUFF(S): 90 AEROSOL, METERED ORAL at 04:08

## 2019-01-14 RX ADMIN — Medication 50 MILLIEQUIVALENT(S): at 06:48

## 2019-01-14 RX ADMIN — HYDROMORPHONE HYDROCHLORIDE 0.5 MILLIGRAM(S): 2 INJECTION INTRAMUSCULAR; INTRAVENOUS; SUBCUTANEOUS at 08:38

## 2019-01-14 RX ADMIN — SODIUM CHLORIDE 4 MILLILITER(S): 9 INJECTION INTRAMUSCULAR; INTRAVENOUS; SUBCUTANEOUS at 09:47

## 2019-01-14 RX ADMIN — GABAPENTIN 300 MILLIGRAM(S): 400 CAPSULE ORAL at 06:46

## 2019-01-14 RX ADMIN — Medication 25 MILLIGRAM(S): at 06:46

## 2019-01-14 RX ADMIN — HYDROMORPHONE HYDROCHLORIDE 0.5 MILLIGRAM(S): 2 INJECTION INTRAMUSCULAR; INTRAVENOUS; SUBCUTANEOUS at 09:10

## 2019-01-14 RX ADMIN — HYDROMORPHONE HYDROCHLORIDE 0.5 MILLIGRAM(S): 2 INJECTION INTRAMUSCULAR; INTRAVENOUS; SUBCUTANEOUS at 01:02

## 2019-01-14 RX ADMIN — DEXMEDETOMIDINE HYDROCHLORIDE IN 0.9% SODIUM CHLORIDE 8.41 MICROGRAM(S)/KG/HR: 4 INJECTION INTRAVENOUS at 10:16

## 2019-01-14 RX ADMIN — OXYCODONE HYDROCHLORIDE 10 MILLIGRAM(S): 5 TABLET ORAL at 19:51

## 2019-01-14 RX ADMIN — HEPARIN SODIUM 5000 UNIT(S): 5000 INJECTION INTRAVENOUS; SUBCUTANEOUS at 22:18

## 2019-01-14 RX ADMIN — HYDROMORPHONE HYDROCHLORIDE 0.5 MILLIGRAM(S): 2 INJECTION INTRAMUSCULAR; INTRAVENOUS; SUBCUTANEOUS at 13:35

## 2019-01-14 RX ADMIN — GABAPENTIN 300 MILLIGRAM(S): 400 CAPSULE ORAL at 18:41

## 2019-01-14 RX ADMIN — Medication 25 MILLIGRAM(S): at 06:47

## 2019-01-14 RX ADMIN — Medication 137 MICROGRAM(S): at 06:47

## 2019-01-14 RX ADMIN — OXYCODONE HYDROCHLORIDE 10 MILLIGRAM(S): 5 TABLET ORAL at 05:24

## 2019-01-14 RX ADMIN — SIMVASTATIN 20 MILLIGRAM(S): 20 TABLET, FILM COATED ORAL at 22:19

## 2019-01-14 RX ADMIN — Medication 40 MILLIGRAM(S): at 06:46

## 2019-01-14 RX ADMIN — BUDESONIDE AND FORMOTEROL FUMARATE DIHYDRATE 2 PUFF(S): 160; 4.5 AEROSOL RESPIRATORY (INHALATION) at 22:45

## 2019-01-14 RX ADMIN — CHLORHEXIDINE GLUCONATE 1 APPLICATION(S): 213 SOLUTION TOPICAL at 15:20

## 2019-01-14 RX ADMIN — AMLODIPINE BESYLATE 10 MILLIGRAM(S): 2.5 TABLET ORAL at 06:45

## 2019-01-14 RX ADMIN — ALBUTEROL 1 PUFF(S): 90 AEROSOL, METERED ORAL at 09:47

## 2019-01-14 RX ADMIN — Medication 1000 MILLIGRAM(S): at 00:08

## 2019-01-14 RX ADMIN — OXYCODONE HYDROCHLORIDE 10 MILLIGRAM(S): 5 TABLET ORAL at 16:15

## 2019-01-14 RX ADMIN — Medication 6: at 12:34

## 2019-01-14 RX ADMIN — HYDROMORPHONE HYDROCHLORIDE 0.5 MILLIGRAM(S): 2 INJECTION INTRAMUSCULAR; INTRAVENOUS; SUBCUTANEOUS at 13:02

## 2019-01-14 RX ADMIN — HYDROMORPHONE HYDROCHLORIDE 0.5 MILLIGRAM(S): 2 INJECTION INTRAMUSCULAR; INTRAVENOUS; SUBCUTANEOUS at 22:30

## 2019-01-14 RX ADMIN — BUDESONIDE AND FORMOTEROL FUMARATE DIHYDRATE 2 PUFF(S): 160; 4.5 AEROSOL RESPIRATORY (INHALATION) at 09:48

## 2019-01-14 RX ADMIN — PIPERACILLIN AND TAZOBACTAM 25 GRAM(S): 4; .5 INJECTION, POWDER, LYOPHILIZED, FOR SOLUTION INTRAVENOUS at 02:37

## 2019-01-14 RX ADMIN — HYDROMORPHONE HYDROCHLORIDE 0.5 MILLIGRAM(S): 2 INJECTION INTRAMUSCULAR; INTRAVENOUS; SUBCUTANEOUS at 22:18

## 2019-01-14 RX ADMIN — SODIUM CHLORIDE 2000 MILLILITER(S): 9 INJECTION, SOLUTION INTRAVENOUS at 06:20

## 2019-01-14 RX ADMIN — SODIUM CHLORIDE 4 MILLILITER(S): 9 INJECTION INTRAMUSCULAR; INTRAVENOUS; SUBCUTANEOUS at 16:29

## 2019-01-14 RX ADMIN — OXYCODONE HYDROCHLORIDE 10 MILLIGRAM(S): 5 TABLET ORAL at 01:04

## 2019-01-14 RX ADMIN — CHLORHEXIDINE GLUCONATE 15 MILLILITER(S): 213 SOLUTION TOPICAL at 06:48

## 2019-01-14 RX ADMIN — DEXMEDETOMIDINE HYDROCHLORIDE IN 0.9% SODIUM CHLORIDE 8.41 MICROGRAM(S)/KG/HR: 4 INJECTION INTRAVENOUS at 19:51

## 2019-01-14 RX ADMIN — OXYCODONE HYDROCHLORIDE 10 MILLIGRAM(S): 5 TABLET ORAL at 12:05

## 2019-01-14 RX ADMIN — HYDROMORPHONE HYDROCHLORIDE 0.5 MILLIGRAM(S): 2 INJECTION INTRAMUSCULAR; INTRAVENOUS; SUBCUTANEOUS at 00:32

## 2019-01-14 RX ADMIN — ALBUTEROL 1 PUFF(S): 90 AEROSOL, METERED ORAL at 22:50

## 2019-01-14 RX ADMIN — PIPERACILLIN AND TAZOBACTAM 25 GRAM(S): 4; .5 INJECTION, POWDER, LYOPHILIZED, FOR SOLUTION INTRAVENOUS at 18:41

## 2019-01-14 RX ADMIN — HUMAN INSULIN 14 UNIT(S): 100 INJECTION, SUSPENSION SUBCUTANEOUS at 18:54

## 2019-01-14 RX ADMIN — PANTOPRAZOLE SODIUM 40 MILLIGRAM(S): 20 TABLET, DELAYED RELEASE ORAL at 11:34

## 2019-01-14 RX ADMIN — CHLORHEXIDINE GLUCONATE 15 MILLILITER(S): 213 SOLUTION TOPICAL at 18:41

## 2019-01-14 RX ADMIN — Medication 250 MILLIGRAM(S): at 06:47

## 2019-01-14 RX ADMIN — OXYCODONE HYDROCHLORIDE 10 MILLIGRAM(S): 5 TABLET ORAL at 01:34

## 2019-01-14 RX ADMIN — PIPERACILLIN AND TAZOBACTAM 25 GRAM(S): 4; .5 INJECTION, POWDER, LYOPHILIZED, FOR SOLUTION INTRAVENOUS at 10:16

## 2019-01-14 RX ADMIN — SIMVASTATIN 20 MILLIGRAM(S): 20 TABLET, FILM COATED ORAL at 00:10

## 2019-01-14 NOTE — PROGRESS NOTE ADULT - ASSESSMENT
63 year old female with RA immunosuppressed from chronic steroids, underlying DM, presented on 12/15 with SBO.  S/p laparotomy and subsequent revision, now with respiratory failure and sepsis due to an enterococcal bacteremia.  Course/Infection complicated by underlying DM/ immuno suppression. High risk for complications.    Critically ill with Respiratory failure    1) Enterococcal bacteremia: Enterococcus in the blood is most often form a , abdominal, wound or line infection.   the patient has risk for all of the above.    Recent C ta/p without abscess but concern for midline wound infection  -Wound culture testing- sergey gram positive  -Check a urine Culture  -S/p line change    -Repeat blood culture testing  -Check echo  -Continue vancomycin (check through before 4th dose)    2) Abdominal Wound: retention sutures in place  Some drainage from lower pole  No abscess on CT    Wound Care  Continue vanco/ zosyn to cover gram negative/anerobe  as well-    3) Immunosuppressed: due ot steroid use  Impairs wound healing    4) DM: glycemic control

## 2019-01-14 NOTE — PROGRESS NOTE ADULT - ASSESSMENT
63y Female hx of COPD presented initially with SBO, underwent surgery closed with 4 retention sutures, evicerated on 12/30 and closed with 6 more retention sutures, now c/b multifocal pneumonia, flu and respiratory distress /hypoxia on continuous bipap. Patient temporarily transferred to RCU, but developed hypercapnia and respiratory distress requiring intubation 1/6 and subsequent transfer to SICU. S/p bronch 1/9/18    #neuro  Awake, alert, follows commands, RASS 0  - Continue precedex  - Pain control w/ standing acetaminophen and hydromorphone 0.5 mg q4h, PRN oxycodone   - C/w gabapentin    #resp  Hx COPD and pulm HTN, current smoker, course c/b viral pneumonia, s/p bronch 1/9  - Intubated, on AC CMV now, FIO2 50%, try to wean down and switch to CPAP  - daily CXR AM for tube position checks  - ABG checks as appropriate  - C/w inhalers, pulmozyme, pulmonary toilet   - OOB to chair     #CV  Vitals stable  - Continue amlodipine 10mg  - Metoprolol 25mg po BID   - Monitor A-line site, changed over guidewire on 1/9, difficult Arterial access low axillary/brachial    #GI  s/p ex-lap for evisceration, retention sutures placed  - Daily wound check   - c/w protonix daily  - c/w TF 41 ml/hr, 24 hrs    #renal  - BMP and I&O monitoring   - C/w iraheta for UOP monitoring   - Repletion of electrolytes PRN  - Lasix 40mg IV qd  - Diamox 250mg BID for metabolic alkalosis     #heme  - stable H/H, s/p 1 prbc transfusion 1/8  - SQH for dvt ppx  - b/l LE venous duplex to r/o DVT    #ID  spiked fevers in last 2 days, blood cultures sent, started on zosyn and vanco  - blood cultures growing enterococcus, wound culture in lab   - lines: myrtle, rt IJV (1/6), rt brachial A line, amaury, ET  -appreciate ID consult  -plan to switch A line and central line today   - TTE on Monday to r/o endocarditis     #endo  - hx RA on chronic prednisone, DM, hypothyroid  - Hydrocortisone 25 mg BID, do not titrate further down as this is nearly equivalent to home dose   - NPH 14u q6h while 22 hr continuous tube feeds continued, moderate dose corrective scale q6h  - Endo following, appreciate recs  - Synthroid 137mcg qhs    #dispo  -SICU  -GOC discussion with family and confirm HCP 63y Female hx of COPD presented initially with SBO, underwent surgery closed with 4 retention sutures, evicerated on 12/30 and closed with 6 more retention sutures, now c/b multifocal pneumonia, flu and respiratory distress /hypoxia on continuous bipap. Patient temporarily transferred to RCU, but developed hypercapnia and respiratory distress requiring intubation 1/6 and subsequent transfer to SICU. S/p bronch 1/9/18    #neuro  Awake, alert, follows commands, RASS 0  - Continue precedex 0.3  - Pain control w/ standing acetaminophen and hydromorphone 0.5 mg q4h, PRN oxycodone   - C/w gabapentin    #resp  Hx COPD and pulm HTN, current smoker, course c/b viral pneumonia, s/p bronch 1/9  - Intubated, on AC CMV now, FIO2 50%, try to wean down and switch to CPAP  - daily CXR AM for tube position checks  - ABG checks as appropriate  - C/w inhalers, pulmozyme, pulmonary toilet   - OOB to chair   - appreciate pulm recs     #CV  episode of hypotension last night, propofol stopped, received 500 ml IVF, HD stable now   - Continue amlodipine 10mg  - Metoprolol 25mg po BID   - Monitor A-line site, changed over guidewire on 1/9, difficult Arterial access low axillary/brachial    #GI  s/p ex-lap for evisceration, 10 retention sutures in situ, gaping wound covered in fibrinous exudate   - Daily wound check and dressing  - c/w protonix daily, home med  - c/w TF 44 ml/hr, 22 hrs, 2 hr break for levothyroxine administration    #renal  - BMP and I&O monitoring   - C/w iraheta for UOP monitoring, adequate uop  - Repletion of electrolytes PRN  - Lasix 40mg IV qd    #heme  - stable H/H, s/p 1 prbc transfusion 1/8  - sub cut heparin 5000 U q8h for dvt ppx  - b/l LE venous duplex to r/o DVT ordered     #ID  spiked fevers in last 2 days, blood cultures sent, started on zosyn and vanco  - blood cultures growing Enterococcus faecium, wound culture 1/12 growing gpc and gpr   - lines: iraheta, rt IJV (1/6) changed to lt IJV 1/13,, rt brachial A line, amaury, ET  -appreciate ID consult, ct abx  - TTE 1/12: good LV function, moderate pulm HTN, endocarditis can not be ruled out, needs AMTEO if strongly suspected     #endo  - hx RA on chronic prednisone, DM, hypothyroid  -pt had one hypoglycemia episode, received MPH 7 U at midnight, sugars not elevated   - Hydrocortisone 25 mg BID, do not titrate further down as this is nearly equivalent to home dose   - NPH 14u q6h while 22 hr continuous tube feeds continued, moderate dose corrective scale q6h changed to low dose  - Endo following, appreciate recs  - Synthroid 137mcg qhs through ngt     #dispo  -SICU  -GOC discussion with family and confirm HCP 63y Female hx of COPD presented initially with SBO, underwent surgery closed with 4 retention sutures, evicerated on 12/30 and closed with 6 more retention sutures, now c/b multifocal pneumonia, flu and respiratory distress /hypoxia on continuous bipap. Patient temporarily transferred to RCU, but developed hypercapnia and respiratory distress requiring intubation 1/6 and subsequent transfer to SICU. S/p bronch 1/9/18    #neuro  Awake, alert, follows commands, RASS 0  - wean off precedex 0.3, for discomfort and anxiety start ativan prn  - Pain control w/ standing acetaminophen and hydromorphone 0.5 mg q4h, PRN oxycodone   - C/w gabapentin    #resp  Hx COPD and pulm HTN, current smoker, course c/b viral pneumonia, s/p bronch 1/9  - Intubated, on AC CMV now, FIO2 50%, try to wean down and switch to CPAP  - daily CXR AM for tube position checks  - ABG checks as appropriate  - C/w inhalers, pulmozyme, pulmonary toilet   - OOB to chair   - appreciate pulm recs     #CV  episode of hypotension last night, propofol stopped, received 500 ml IVF, HD stable now   - Continue amlodipine 10mg  - Metoprolol 25mg po BID   - Monitor A-line site, changed over guidewire on 1/9, difficult Arterial access low axillary/brachial    #GI  s/p ex-lap for evisceration, 10 retention sutures in situ, gaping wound covered in fibrinous exudate   - Daily wound check and dressing by RN  - c/w protonix daily, home med  - c/w TF 44 ml/hr, 22 hrs, 2 hr break for levothyroxine administration, transition to bolus feeds to help with sugar and diarrhea  -dietitian consult for proper calculation of calories and feeds amount   - KUB now to confirm tube tip stomach and not in pylorus     #renal  - BMP and I&O monitoring as routine   - adequate uop, remove iraheta, transition to diaper or female condom catheter  - Repletion of electrolytes PRN  - Lasix 40mg IV qd    #heme  - stable H/H, s/p 1 prbc transfusion 1/8  - sub cut heparin 5000 U q8h for dvt ppx  - b/l LE venous duplex to r/o DVT ordered     #ID  spiked fevers in last 2 days, blood cultures sent, started on zosyn and vanco  - blood cultures growing Enterococcus faecium, wound culture 1/12 growing gpc and gpr   - lines: iraheta, rt IJV (1/6) changed to lt IJV 1/13,, rt brachial A line, amaury, ET  - appreciate ID consult, ct abx  - TTE 1/12: good LV function, moderate pulm HTN, endocarditis can not be ruled out, needs MATEO if strongly suspected     #endo  - hx RA on chronic prednisone, DM, hypothyroid  -pt had one hypoglycemia episode, received MPH 7 U at midnight, sugars not elevated   - Hydrocortisone 25 mg BID, do not titrate further down as this is nearly equivalent to home dose   - Synthroid 137mcg qhs through ngt   - NPH 14u q6h while 22 hr continuous tube feeds continued, moderate dose corrective scale q6h changed to low dose  - Endo following, appreciate recs    Lines:  -d/c A line, EtCO2 monitoring through ET and saturation monitoring with pulseox  -d/c iraheta, diaper for uop    #dispo  -SICU  -GOC discussion with family and confirm HCP

## 2019-01-14 NOTE — PROGRESS NOTE ADULT - PROBLEM SELECTOR PLAN 2
Continue levothyroxine 137 mcg oral daily as the dose was increased from 125 on this admission due to elevated TSH.  Tube feeds should be held one hour before and after levothyroxine administration.

## 2019-01-14 NOTE — PROGRESS NOTE ADULT - SUBJECTIVE AND OBJECTIVE BOX
Pulmonary Progress Note     Interval Events:    SUBJECTIVE:    OBJECTIVE:  ICU Vital Signs Last 24 Hrs  T(C): 38.2 (14 Jan 2019 00:00), Max: 38.2 (14 Jan 2019 00:00)  T(F): 100.8 (14 Jan 2019 00:00), Max: 100.8 (14 Jan 2019 00:00)  HR: 76 (14 Jan 2019 08:00) (68 - 105)  BP: 109/67 (13 Jan 2019 23:00) (109/67 - 115/66)  BP(mean): 77 (13 Jan 2019 23:00) (77 - 78)  ABP: 87/53 (14 Jan 2019 08:00) (81/61 - 128/74)  ABP(mean): 67 (14 Jan 2019 08:00) (62 - 95)  RR: 22 (14 Jan 2019 08:00) (14 - 29)  SpO2: 95% (14 Jan 2019 08:00) (94% - 100%)    Mode: AC/ CMV (Assist Control/ Continuous Mandatory Ventilation), RR (machine): 22, TV (machine): 400, FiO2: 50, PEEP: 10, MAP: 16, PIP: 27    01-13 @ 07:01  -  01-14 @ 07:00  --------------------------------------------------------  IN: 1117.3 mL / OUT: 2690 mL / NET: -1572.7 mL      CAPILLARY BLOOD GLUCOSE      POCT Blood Glucose.: 103 mg/dL (14 Jan 2019 06:50)      PHYSICAL EXAM:  General: NAD, appears comfortably on vent  HEENT: NCAT, MOM, EOMI  Neck: Supple  Respiratory: coarse breath sounds bilaterally   Cardiovascular: RRR, S1/S2, (-) m/g/r, (-) pitting edema  Extremities: (-) clubbing/cyanosis/edema  Skin: c/d/i. NGT in place   Neurological: No new focal findings  Psychiatry: Appropriate    HOSPITAL MEDICATIONS:  MEDICATIONS  (STANDING):  ALBUTerol    90 MICROgram(s) HFA Inhaler 1 Puff(s) Inhalation every 6 hours  amLODIPine   Tablet 10 milliGRAM(s) Oral daily  buDESOnide 160 MICROgram(s)/formoterol 4.5 MICROgram(s) Inhaler 2 Puff(s) Inhalation two times a day  chlorhexidine 0.12% Liquid 15 milliLiter(s) Swish and Spit two times a day  chlorhexidine 4% Liquid 1 Application(s) Topical <User Schedule>  dexmedetomidine Infusion 0.3 MICROgram(s)/kG/Hr (8.415 mL/Hr) IV Continuous <Continuous>  dextrose 50% Injectable 25 milliLiter(s) IV Push once  furosemide   Injectable 40 milliGRAM(s) IV Push daily  gabapentin   Solution 300 milliGRAM(s) Oral two times a day  heparin  Injectable 5000 Unit(s) SubCutaneous every 8 hours  hydrocortisone sodium succinate Injectable 25 milliGRAM(s) IV Push every 12 hours  influenza   Vaccine 0.5 milliLiter(s) IntraMuscular once  insulin lispro (HumaLOG) corrective regimen sliding scale   SubCutaneous every 6 hours  insulin NPH human recombinant 14 Unit(s) SubCutaneous every 6 hours  levothyroxine 137 MICROGram(s) Oral daily  metoprolol tartrate 25 milliGRAM(s) Enteral Tube two times a day  pantoprazole   Suspension 40 milliGRAM(s) Oral daily  piperacillin/tazobactam IVPB. 3.375 Gram(s) IV Intermittent every 8 hours  potassium chloride  20 mEq/100 mL IVPB 20 milliEquivalent(s) IV Intermittent every 2 hours  propofol Infusion 50 MICROgram(s)/kG/Min (33.66 mL/Hr) IV Continuous <Continuous>  simvastatin 20 milliGRAM(s) Oral at bedtime  sodium chloride 3%  Inhalation 4 milliLiter(s) Inhalation every 6 hours  vancomycin  IVPB      vancomycin  IVPB 1000 milliGRAM(s) IV Intermittent every 12 hours    MEDICATIONS  (PRN):  HYDROmorphone  Injectable 0.5 milliGRAM(s) IV Push every 4 hours PRN Pain unrelieved by PO  oxyCODONE    Solution 10 milliGRAM(s) Enteral Tube every 4 hours PRN Severe Pain (7 - 10)  oxyCODONE    Solution 5 milliGRAM(s) Enteral Tube every 4 hours PRN Moderate Pain (4 - 6)      LABS:                        7.1    8.68  )-----------( 362      ( 14 Jan 2019 03:45 )             23.4     Hgb Trend: 7.1<--, 8.5<--, 8.1<--, 8.5<--, 8.9<--  01-14    143  |  105  |  18  ----------------------------<  98  3.1<L>   |  24  |  1.12    Ca    8.3<L>      14 Jan 2019 03:45  Phos  3.5     01-14  Mg     2.0     01-14      Creatinine Trend: 1.12<--, 1.03<--, 1.06<--, 0.94<--, 0.81<--, 0.89<--      Arterial Blood Gas:  01-14 @ 03:45  7.45/41/93/28/97.0/3.8  ABG lactate: 2.1  Arterial Blood Gas:  01-13 @ 03:11  7.40/43/72/26/92.7/1.5  ABG lactate: 1.3  Arterial Blood Gas:  01-12 @ 20:00  7.45/42/58/29/86.6/4.9  ABG lactate: 1.2  Arterial Blood Gas:  01-12 @ 10:40  7.47/45/67/32/92.7/7.9  ABG lactate: 1.3        MICROBIOLOGY:     Culture - Blood (collected 11 Jan 2019 21:31)  Source: BLOOD PERIPHERAL  Preliminary Report (12 Jan 2019 13:55):    ***Blood Panel PCR results on this specimen are available    approximately 3 hours after the Gram stain result***    Gram stain, PCR, and/or culture results may not always    correspond due to difference in methodologies    ------------------------------------------------------------    This PCR assay was performed using [employer].  The    following targets are tested for:  Enterococcus, vancomycin    resistant enterococci, Listeria monocytogenes,  coagulase    negative staphylococci, S. aureus, methicillin resistant S.    aureus, Streptococcus agalactiae (Group B), S. pneumoniae,    S. pyogenes (Group A), Acinetobacter baumannii, Enterobacter    cloacae, E. coli, Klebsiella oxytoca, K. pneumoniae, Proteus    sp., Serratia marcescens, Haemophilus influenzae, Neisseria    meningitidis, Pseudomonas aeruginosa, Candida albicans, C.    glabrata, C. krusei, C. parapsilosis, C. tropicalis and the    KPC resistance gene.    **NOTE: Due to technical problems, Proteus sp. will NOT be    reported as part of the BCID paneluntil further notice.  Organism: BLOOD CULTURE PCR  Enterococcus faecium (13 Jan 2019 13:19)  Organism: Enterococcus faecium (13 Jan 2019 13:19)  Organism: BLOOD CULTURE PCR (12 Jan 2019 13:55)    Culture - Blood (collected 11 Jan 2019 20:25)  Source: BLOOD VENOUS  Preliminary Report (13 Jan 2019 20:23):    NO ORGANISMS ISOLATED    NO ORGANISMS ISOLATED AT 48 HRS.        RADIOLOGY:  [x] Reviewed and interpreted by me    PULMONARY FUNCTION TESTS:

## 2019-01-14 NOTE — PROGRESS NOTE ADULT - SUBJECTIVE AND OBJECTIVE BOX
Follow Up:      Inverval History/ROS:Patient is a 63y old  Female who presents with a chief complaint of abdominal pain (14 Jan 2019 08:29)    Intubated  No events  Fever      Allergies    No Known Allergies    Intolerances        ANTIMICROBIALS:  piperacillin/tazobactam IVPB. 3.375 every 8 hours  vancomycin  IVPB    vancomycin  IVPB 1000 every 12 hours      OTHER MEDS:  ALBUTerol    90 MICROgram(s) HFA Inhaler 1 Puff(s) Inhalation every 6 hours  amLODIPine   Tablet 10 milliGRAM(s) Oral daily  buDESOnide 160 MICROgram(s)/formoterol 4.5 MICROgram(s) Inhaler 2 Puff(s) Inhalation two times a day  chlorhexidine 0.12% Liquid 15 milliLiter(s) Swish and Spit two times a day  chlorhexidine 4% Liquid 1 Application(s) Topical <User Schedule>  dexmedetomidine Infusion 0.3 MICROgram(s)/kG/Hr IV Continuous <Continuous>  dextrose 50% Injectable 25 milliLiter(s) IV Push once  furosemide   Injectable 40 milliGRAM(s) IV Push daily  gabapentin   Solution 300 milliGRAM(s) Oral two times a day  heparin  Injectable 5000 Unit(s) SubCutaneous every 8 hours  hydrocortisone sodium succinate Injectable 25 milliGRAM(s) IV Push every 12 hours  HYDROmorphone  Injectable 0.5 milliGRAM(s) IV Push every 4 hours PRN  influenza   Vaccine 0.5 milliLiter(s) IntraMuscular once  insulin lispro (HumaLOG) corrective regimen sliding scale   SubCutaneous every 6 hours  insulin NPH human recombinant 14 Unit(s) SubCutaneous every 6 hours  levothyroxine 137 MICROGram(s) Oral daily  metoprolol tartrate 25 milliGRAM(s) Enteral Tube two times a day  oxyCODONE    Solution 10 milliGRAM(s) Enteral Tube every 4 hours PRN  oxyCODONE    Solution 5 milliGRAM(s) Enteral Tube every 4 hours PRN  pantoprazole   Suspension 40 milliGRAM(s) Oral daily  propofol Infusion 50 MICROgram(s)/kG/Min IV Continuous <Continuous>  simvastatin 20 milliGRAM(s) Oral at bedtime  sodium chloride 3%  Inhalation 4 milliLiter(s) Inhalation every 6 hours      Vital Signs Last 24 Hrs  T(C): 37.9 (14 Jan 2019 08:00), Max: 38.2 (14 Jan 2019 00:00)  T(F): 100.3 (14 Jan 2019 08:00), Max: 100.8 (14 Jan 2019 00:00)  HR: 74 (14 Jan 2019 10:00) (68 - 105)  BP: 109/67 (13 Jan 2019 23:00) (109/67 - 115/66)  BP(mean): 77 (13 Jan 2019 23:00) (77 - 78)  RR: 19 (14 Jan 2019 10:00) (14 - 29)  SpO2: 98% (14 Jan 2019 10:00) (93% - 100%)    PHYSICAL EXAM:  General: [ x] intubated / awake  HEAD/EYES: [ ] PERRL [x ] white sclera [ ] icterus  ENT:  [ ] normal [x ] supple [ ] thrush [ ] pharyngeal exudate  Cardiovascular:   [ ] murmur [x ] normal [ ] PPM/AICD  Respiratory:  [ x] clear to ausculation bilaterally  GI:  [x ] soft, non-tender, normal bowel sounds  draiange lower pole abd wound  :  [x ] iraheta [ ] no CVA tenderness   Musculoskeletal:  [ ] no synovitis  Neurologic:  [x ] non-focal exam   Skin:  [x ] no rash  Lymph: [x ] no lymphadenopathy  Psychiatric:  [ x] appropriate affect [ ] alert & oriented  Lines:  [ ] no phlebitis [x ] central line- cdi- changed to left IJ                                8.3    11.25 )-----------( 409      ( 14 Jan 2019 09:04 )             27.2       01-14    143  |  105  |  18  ----------------------------<  98  3.1<L>   |  24  |  1.12    Ca    8.3<L>      14 Jan 2019 03:45  Phos  3.5     01-14  Mg     2.0     01-14            MICROBIOLOGY:Culture - Respiratory:   NRF^Normal Respiratory Perla  QUANTITY OF GROWTH: MANY (01-09-19 @ 12:31)      RADIOLOGY:

## 2019-01-14 NOTE — PROGRESS NOTE ADULT - SUBJECTIVE AND OBJECTIVE BOX
General Surgery Progress Note    SUBJECTIVE:  The patient was seen and examined. No acute events overnight. Still intubated on mechanical ventilation. R brachial A line in place, R IJ central line changed to left IJ. Continues to be on 0.3mcg of precedex, and IV vanc and zosyn.     OBJECTIVE:     ** VITAL SIGNS / I&O's **    Vital Signs Last 24 Hrs  T(C): 37.9 (14 Jan 2019 08:00), Max: 38.2 (14 Jan 2019 00:00)  T(F): 100.3 (14 Jan 2019 08:00), Max: 100.8 (14 Jan 2019 00:00)  HR: 84 (14 Jan 2019 09:00) (68 - 105)  BP: 109/67 (13 Jan 2019 23:00) (109/67 - 115/66)  BP(mean): 77 (13 Jan 2019 23:00) (77 - 78)  RR: 25 (14 Jan 2019 09:00) (14 - 29)  SpO2: 95% (14 Jan 2019 09:00) (93% - 100%)  Mode: AC/ CMV (Assist Control/ Continuous Mandatory Ventilation)  RR (machine): 22  TV (machine): 400  FiO2: 50  PEEP: 10  MAP: 16  PIP: 27      13 Jan 2019 07:01  -  14 Jan 2019 07:00  --------------------------------------------------------  IN:    dexmedetomidine Infusion: 131.6 mL    Glucerna: 820 mL    IV PiggyBack: 950 mL    propofol Infusion: 23.5 mL  Total IN: 1925.1 mL    OUT:    Indwelling Catheter - Urethral: 3140 mL    Rectal Tube: 100 mL  Total OUT: 3240 mL    Total NET: -1314.9 mL      14 Jan 2019 07:01  -  14 Jan 2019 09:43  --------------------------------------------------------  IN:    dexmedetomidine Infusion: 18.2 mL    Glucerna: 90 mL    IV PiggyBack: 100 mL  Total IN: 208.2 mL    OUT:    Indwelling Catheter - Urethral: 400 mL  Total OUT: 400 mL    Total NET: -191.8 mL          ** PHYSICAL EXAM **    -- CONSTITUTIONAL: intubated  -- PULMONARY: intubated on mechanical vent  -- HEENT: NGT in place  -- ABDOMEN: soft, mild tenderness, retention sutures in place, seropurulent drainage from midline wound.    -- EXT: WWP  -- SKIN: no rashes    ** LABS **                          8.3    11.25 )-----------( 409      ( 14 Jan 2019 09:04 )             27.2     14 Jan 2019 03:45    143    |  105    |  18     ----------------------------<  98     3.1     |  24     |  1.12     Ca    8.3        14 Jan 2019 03:45  Phos  3.5       14 Jan 2019 03:45  Mg     2.0       14 Jan 2019 03:45        CAPILLARY BLOOD GLUCOSE      POCT Blood Glucose.: 103 mg/dL (14 Jan 2019 06:50)  POCT Blood Glucose.: 135 mg/dL (13 Jan 2019 23:40)  POCT Blood Glucose.: 51 mg/dL (13 Jan 2019 18:08)  POCT Blood Glucose.: 206 mg/dL (13 Jan 2019 12:06)            Culture - Wound (collected 12 Jan 2019 21:49)  Source: OTHER  Preliminary Report (14 Jan 2019 09:24):    CULTURE IN PROGRESS, FURTHER REPORT TO FOLLOW.    GPC^Gram pos. cocci    Culture - Blood (collected 11 Jan 2019 21:31)  Source: BLOOD PERIPHERAL  Preliminary Report (12 Jan 2019 13:55):    ***Blood Panel PCR results on this specimen are available    approximately 3 hours after the Gram stain result***    Gram stain, PCR, and/or culture results may not always    correspond due to difference in methodologies    ------------------------------------------------------------    This PCR assay was performed using [employer].  The    following targets are tested for:  Enterococcus, vancomycin    resistant enterococci, Listeria monocytogenes,  coagulase    negative staphylococci, S. aureus, methicillin resistant S.    aureus, Streptococcus agalactiae (Group B), S. pneumoniae,    S. pyogenes (Group A), Acinetobacter baumannii, Enterobacter    cloacae, E. coli, Klebsiella oxytoca, K. pneumoniae, Proteus    sp., Serratia marcescens, Haemophilus influenzae, Neisseria    meningitidis, Pseudomonas aeruginosa, Candida albicans, C.    glabrata, C. krusei, C. parapsilosis, C. tropicalis and the    KPC resistance gene.    **NOTE: Due to technical problems, Proteus sp. will NOT be    reported as part of the BCID paneluntil further notice.  Organism: BLOOD CULTURE PCR  Enterococcus faecium (13 Jan 2019 13:19)  Organism: Enterococcus faecium (13 Jan 2019 13:19)  Organism: BLOOD CULTURE PCR (12 Jan 2019 13:55)    Culture - Blood (collected 11 Jan 2019 20:25)  Source: BLOOD VENOUS  Preliminary Report (13 Jan 2019 20:23):    NO ORGANISMS ISOLATED    NO ORGANISMS ISOLATED AT 48 HRS.          MEDICATIONS  (STANDING):  ALBUTerol    90 MICROgram(s) HFA Inhaler 1 Puff(s) Inhalation every 6 hours  amLODIPine   Tablet 10 milliGRAM(s) Oral daily  buDESOnide 160 MICROgram(s)/formoterol 4.5 MICROgram(s) Inhaler 2 Puff(s) Inhalation two times a day  chlorhexidine 0.12% Liquid 15 milliLiter(s) Swish and Spit two times a day  chlorhexidine 4% Liquid 1 Application(s) Topical <User Schedule>  dexmedetomidine Infusion 0.3 MICROgram(s)/kG/Hr (8.415 mL/Hr) IV Continuous <Continuous>  dextrose 50% Injectable 25 milliLiter(s) IV Push once  furosemide   Injectable 40 milliGRAM(s) IV Push daily  gabapentin   Solution 300 milliGRAM(s) Oral two times a day  heparin  Injectable 5000 Unit(s) SubCutaneous every 8 hours  hydrocortisone sodium succinate Injectable 25 milliGRAM(s) IV Push every 12 hours  influenza   Vaccine 0.5 milliLiter(s) IntraMuscular once  insulin lispro (HumaLOG) corrective regimen sliding scale   SubCutaneous every 6 hours  insulin NPH human recombinant 14 Unit(s) SubCutaneous every 6 hours  levothyroxine 137 MICROGram(s) Oral daily  metoprolol tartrate 25 milliGRAM(s) Enteral Tube two times a day  pantoprazole   Suspension 40 milliGRAM(s) Oral daily  piperacillin/tazobactam IVPB. 3.375 Gram(s) IV Intermittent every 8 hours  potassium chloride  20 mEq/100 mL IVPB 20 milliEquivalent(s) IV Intermittent every 2 hours  propofol Infusion 50 MICROgram(s)/kG/Min (33.66 mL/Hr) IV Continuous <Continuous>  simvastatin 20 milliGRAM(s) Oral at bedtime  sodium chloride 3%  Inhalation 4 milliLiter(s) Inhalation every 6 hours  vancomycin  IVPB      vancomycin  IVPB 1000 milliGRAM(s) IV Intermittent every 12 hours    MEDICATIONS  (PRN):  HYDROmorphone  Injectable 0.5 milliGRAM(s) IV Push every 4 hours PRN Pain unrelieved by PO  oxyCODONE    Solution 10 milliGRAM(s) Enteral Tube every 4 hours PRN Severe Pain (7 - 10)  oxyCODONE    Solution 5 milliGRAM(s) Enteral Tube every 4 hours PRN Moderate Pain (4 - 6)

## 2019-01-14 NOTE — PROGRESS NOTE ADULT - ASSESSMENT
63F w/ multiple respiratory comorbidities p/w SBO s/p ex-lap with findings of healthy bowel c/b evisceration of bowel s/p ex-lap with placement of retention sutures, now with worsening separation at skin edges requiring SICU level care including intubation    Plan  - Per Pulm: continue CPAP trial, diuresis with Diamox to correct metabolic alkalosis, and if ABG improves with diamox, agree with plan for extubation tomorrow. C/w current inhaler therapy  - Pain control: dilaudid 0.5mg IV push q4h PRN, oxy 5&10 via nasogatric tube q4h PRN, Tylenol 650mg suspension PO q6h, gabapentin 300mg via NGT  - NPO w/ tube feeds  - Monitor GI fxn   - Monitor respiratory status  - Follow long term care plan - will likely need tracheostomy ([place] to discuss with family)  - DVT ppx: subQ heparin  - Appreciate SICU care    Zulema Wilkinson, PGY-1  [facility] General Surgery- B Team  [id number]

## 2019-01-14 NOTE — PROGRESS NOTE ADULT - SUBJECTIVE AND OBJECTIVE BOX
SICU Daily Progress Note  =====================================================  Interval/Overnight Events:  Patient underwent SBT and did well. Left central line placed and right central line removed. Patient has continued pain.     HPI  63y Female hx of COPD presented initially with SBO, underwent surgery closed with 4 retention sutures, eviscerated on 12/30 and closed with 6 more retention sutures, now c/b multifocal pneumonia, flu and respiratory distress /hypoxia on continuous bipap. Patient temporarily transferred to RCU, but developed hypercapnia and respiratory distress requiring intubation 1/6 and subsequent transfer to SICU. course complicated by fevers and blood culture positive for enterococci.       Vital Signs Last 24 Hrs  T(C): 37.9 (13 Jan 2019 20:00), Max: 37.9 (13 Jan 2019 08:00)  T(F): 100.3 (13 Jan 2019 20:00), Max: 100.3 (13 Jan 2019 20:00)  HR: 96 (13 Jan 2019 22:58) (77 - 105)  BP: 115/66 (13 Jan 2019 11:00) (115/66 - 115/66)  BP(mean): 78 (13 Jan 2019 11:00) (78 - 78)  RR: 22 (13 Jan 2019 22:00) (14 - 29)  SpO2: 96% (13 Jan 2019 22:58) (93% - 100%)    Physical Exam:  General Appearance: Appears well, NAD  Respiratory: No labored breathing  CV: Pulse regularly present  Abdomen: Soft, nontense, abdomen with retention sutures.       LABS:                        8.5    9.97  )-----------( 442      ( 13 Jan 2019 03:11 )             27.5     01-13    141  |  103  |  20  ----------------------------<  258<H>  4.3   |  24  |  1.03    Ca    9.0      13 Jan 2019 03:11  Phos  4.6     01-13  Mg     2.2     01-13            INs and OUTs:    01-12-19 @ 07:01  -  01-13-19 @ 07:00  --------------------------------------------------------  IN: 2310.8 mL / OUT: 2150 mL / NET: 160.8 mL    01-13-19 @ 07:01 - 01-14-19 @ 00:30  --------------------------------------------------------  IN: 646.1 mL / OUT: 2325 mL / NET: -1678.9 mL HISTORY  63y Female with pmh COPD, DM, RA, hypothyroid, pulm HTN, CHRIS, HTN admitted on 12/15 for SBO, underwent exploratory laparotomy and adhesiolysis, no transition point was noted. pt transferred to SICU for intentional delayed extubation post op due to pulm comorbidities.   transferred to floors after extubation, eviscerated after having violent coughing episode, RTOR, returned to SICU for intentional delayed extubation. Pt transferred to RCU as developed viral multifocal pneumonia, returned for worsening respi distress, intubated in SICU and remains intubated till date. Currently course c/b E fecalis bacteremia.     24 HOUR EVENTS: pt had change of right IJ central line to left IJ. Right brachial A line still in situ. Pt remains febrile, reculture today has not been sent due to difficult stick. Trial to wean off ventilator not done yd due to active issues. Propofol gtt d/c ed due to lowering trends in BP.     SUBJECTIVE/ROS:  [ ] A ten-point review of systems was otherwise negative except as noted.  [ ] Due to altered mental status/intubation, subjective information were not able to be obtained from the patient. History was obtained, to the extent possible, from review of the chart and collateral sources of information.      NEURO  RASS:   0-1  GCS: 15/15    CAM ICU:  Exam: can be woken up, follows commands,     Meds: dexmedetomidine Infusion 0.3 MICROgram(s)/kG/Hr IV Continuous <Continuous>  gabapentin   Solution 300 milliGRAM(s) Oral two times a day  HYDROmorphone  Injectable 0.5 milliGRAM(s) IV Push every 4 hours PRN Pain unrelieved by PO  oxyCODONE    Solution 10 milliGRAM(s) Enteral Tube every 4 hours PRN Severe Pain (7 - 10)  oxyCODONE    Solution 5 milliGRAM(s) Enteral Tube every 4 hours PRN Moderate Pain (4 - 6)    [x] Adequacy of sedation and pain control has been assessed and adjusted    RESPIRATORY  RR: 22 (01-14-19 @ 04:00) (14 - 29)  SpO2: 97% (01-14-19 @ 07:24) (94% - 100%)    Wt(kg): --    Exam: intubated, decreased AE b/l     Mechanical Ventilation: Mode: AC/ CMV (Assist Control/ Continuous Mandatory Ventilation), RR (machine): 22, RR (patient): 22, TV (machine): 400, FiO2: 50, PEEP: 10, MAP: 16, PIP: 27    ABG - ( 14 Jan 2019 03:45 )  pH: 7.45  /  pCO2: 41    /  pO2: 93    / HCO3: 28    / Base Excess: 3.8   /  SaO2: 97.0    Lactate: 2.1      chest xray today pending     [x] Extubation Readiness Assessed: not ready    Meds: ALBUTerol    90 MICROgram(s) HFA Inhaler 1 Puff(s) Inhalation every 6 hours  buDESOnide 160 MICROgram(s)/formoterol 4.5 MICROgram(s) Inhaler 2 Puff(s) Inhalation two times a day  sodium chloride 3%  Inhalation 4 milliLiter(s) Inhalation every 6 hours      CARDIOVASCULAR  HR: 82 (01-14-19 @ 07:24) (68 - 105)  BP: 109/67 (01-13-19 @ 23:00) (109/67 - 115/66)  BP(mean): 77 (01-13-19 @ 23:00) (77 - 78)  ABP: 87/48 (01-14-19 @ 04:00) (81/61 - 128/74)  ABP(mean): 64 (01-14-19 @ 04:00) (62 - 95)  Wt(kg): --    Exam: regular rate and rhythm  Cardiac Rhythm on tele: sinus  Perfusion     [x]Adequate   [ ]Inadequate  Mentation   [x]Normal       [ ]Reduced  Extremities  [x]Warm         [ ]Cool    Volume Status [ ]Hypervolemic [x]Euvolemic [ ]Hypovolemic    Meds:   amLODIPine   Tablet 10 milliGRAM(s) Oral daily  furosemide   Injectable 40 milliGRAM(s) IV Push daily  metoprolol tartrate 25 milliGRAM(s) Enteral Tube two times a day    GI/NUTRITION  Exam: soft, nontender, nondistended, incision C/D/I    Diet: tube feeds with Glucerna 44 ml/hr for 22 hrs     Meds: pantoprazole   Suspension 40 milliGRAM(s) Oral daily      GENITOURINARY  I&O's Detail    01-13 @ 07:01  -  01-14 @ 07:00  --------------------------------------------------------  IN:    dexmedetomidine Infusion: 114.8 mL    Glucerna: 779 mL    IV PiggyBack: 200 mL    propofol Infusion: 23.5 mL  Total IN: 1117.3 mL    OUT:    Indwelling Catheter - Urethral: 2690 mL  Total OUT: 2690 mL    Total NET: -1572.7 mL    01-14    143  |  105  |  18  ----------------------------<  98  3.1<L>   |  24  |  1.12    Creatinine Trend: 1.12<--, 1.03<--, 1.06<--, 0.94<--, 0.81<--, 0.89<--    Ca    8.3<L>      14 Jan 2019 03:45  Phos  3.5     01-14  Mg     2.0     01-14    [ x] Iraheta catheter, indication: uop monitoring in critical care pt     Meds: lactated ringers Bolus 500 milliLiter(s) IV Bolus once for hypotension   potassium chloride  20 mEq/100 mL IVPB 20 milliEquivalent(s) IV Intermittent every 2 hours    HEMATOLOGIC    [x] VTE Prophylaxis  heparin  Injectable 5000 Unit(s) SubCutaneous every 8 hours                          7.1    8.68  )-----------( 362      ( 14 Jan 2019 03:45 )             23.4     Transfusion     [ ] PRBC   [ ] Platelets   [ ] FFP   [ ] Cryoprecipitate      INFECTIOUS DISEASES    T(C): 38.2 (14 Jan 2019 00:00), Max: 38.2 (14 Jan 2019 00:00)  T(F): 100.8 (14 Jan 2019 00:00), Max: 100.8 (14 Jan 2019 00:00)    WBC Count: 8.68 K/uL (01-14 @ 03:45)  WBC Count: 9.97 K/uL (01-13 @ 03:11)  WBC Count: 9.73 K/uL (01-12 @ 02:10)  WBC Count: 10.44 K/uL (01-11 @ 02:10)  WBC Count: 13.83 K/uL (01-10 @ 02:45)    RECENT CULTURES:  BLOOD PERIPHERAL  01-11 @ 21:31 --  E faecium in aerobic bottle after 14 hours  BLOOD CULTURE PCR  Enterococcus faecium    Meds: influenza   Vaccine 0.5 milliLiter(s) IntraMuscular once  piperacillin/tazobactam IVPB. 3.375 Gram(s) IV Intermittent every 8 hours  vancomycin  IVPB      vancomycin  IVPB 1000 milliGRAM(s) IV Intermittent every 12 hours  vanco trough pre 3rd dose      ENDOCRINE  CAPILLARY BLOOD GLUCOSE    POCT Blood Glucose.: 103 mg/dL (14 Jan 2019 06:50)  POCT Blood Glucose.: 135 mg/dL (13 Jan 2019 23:40)  POCT Blood Glucose.: 51 mg/dL (13 Jan 2019 18:08)  POCT Blood Glucose.: 206 mg/dL (13 Jan 2019 12:06)    Meds:   dextrose 50% Injectable 25 milliLiter(s) IV Push once for hypoglycemia to 51  hydrocortisone sodium succinate Injectable 25 milliGRAM(s) IV Push every 12 hours  insulin lispro (HumaLOG) corrective regimen sliding scale   SubCutaneous every 6 hours  insulin NPH human recombinant 14 Unit(s) SubCutaneous every 6 hours  levothyroxine 137 MICROGram(s) Oral daily  simvastatin 20 milliGRAM(s) Oral at bedtime    ACCESS DEVICES:  [ ] Peripheral IV  [x ] Central Venous Line	[ ] R	[ x] L	[ x] IJ	[ ] Fem	[ ] SC	Placed: 1/13  [x ] Arterial Line		[ ] R	[ ] L	[ ] Fem	[ ] Rad	[ ] Ax	Placed: 1/9  [ ] PICC:					[ ] Mediport  [x ] Urinary Catheter iraheta, Date Placed:   [x ]feeding tube   [x] ET placed:1/5  [x] Necessity of urinary, arterial, and venous catheters discussed    OTHER MEDICATIONS:  chlorhexidine 0.12% Liquid 15 milliLiter(s) Swish and Spit two times a day  chlorhexidine 4% Liquid 1 Application(s) Topical <User Schedule>      CODE STATUS: full      IMAGING:  < from: CT Abdomen and Pelvis w/ Oral Cont and w/ IV Cont (01.12.19 @ 11:24) >  IMPRESSION:   Ventral midline diastasis and open wound without evidence for discrete   abscess or drainable collection.    Multifocal pneumonia, with increased consolidation in the right middle   and left lower lobes since 1/2/2018.    < end of copied text > HISTORY  63y Female with pmh COPD, DM, RA, hypothyroid, pulm HTN, CHRIS, HTN admitted on 12/15 for SBO, underwent exploratory laparotomy and adhesiolysis, no transition point was noted. pt transferred to SICU for intentional delayed extubation post op due to pulm comorbidities.   transferred to floors after extubation, eviscerated after having violent coughing episode, RTOR, returned to SICU for intentional delayed extubation. Pt transferred to RCU as developed viral multifocal pneumonia, returned for worsening respi distress, intubated in SICU and remains intubated till date. Currently course c/b E fecalis bacteremia.     24 HOUR EVENTS: pt had change of right IJ central line to left IJ. Right brachial A line still in situ. Pt remains febrile, reculture today has not been sent due to difficult stick. Trial to wean off ventilator not done yd due to active issues. Propofol gtt d/c ed due to lowering trends in BP.     SUBJECTIVE/ROS:  [ ] A ten-point review of systems was otherwise negative except as noted.  [ ] Due to altered mental status/intubation, subjective information were not able to be obtained from the patient. History was obtained, to the extent possible, from review of the chart and collateral sources of information.      NEURO  RASS:   0-1  GCS: 15/15    CAM ICU:  Exam: can be woken up, follows commands,     Meds: dexmedetomidine Infusion 0.3 MICROgram(s)/kG/Hr IV Continuous <Continuous>  gabapentin   Solution 300 milliGRAM(s) Oral two times a day  HYDROmorphone  Injectable 0.5 milliGRAM(s) IV Push every 4 hours PRN Pain unrelieved by PO  oxyCODONE    Solution 10 milliGRAM(s) Enteral Tube every 4 hours PRN Severe Pain (7 - 10)  oxyCODONE    Solution 5 milliGRAM(s) Enteral Tube every 4 hours PRN Moderate Pain (4 - 6)    [x] Adequacy of sedation and pain control has been assessed and adjusted    RESPIRATORY  RR: 22 (01-14-19 @ 04:00) (14 - 29)  SpO2: 97% (01-14-19 @ 07:24) (94% - 100%)    Wt(kg): --    Exam: intubated, decreased AE b/l     Mechanical Ventilation: Mode: AC/ CMV (Assist Control/ Continuous Mandatory Ventilation), RR (machine): 22, RR (patient): 22, TV (machine): 400, FiO2: 50, PEEP: 10, MAP: 16, PIP: 27    ABG - ( 14 Jan 2019 03:45 )  pH: 7.45  /  pCO2: 41    /  pO2: 93    / HCO3: 28    / Base Excess: 3.8   /  SaO2: 97.0    Lactate: 2.1      chest xray today pending     [x] Extubation Readiness Assessed: not ready    Meds: ALBUTerol    90 MICROgram(s) HFA Inhaler 1 Puff(s) Inhalation every 6 hours  buDESOnide 160 MICROgram(s)/formoterol 4.5 MICROgram(s) Inhaler 2 Puff(s) Inhalation two times a day  sodium chloride 3%  Inhalation 4 milliLiter(s) Inhalation every 6 hours      CARDIOVASCULAR  HR: 82 (01-14-19 @ 07:24) (68 - 105)  BP: 109/67 (01-13-19 @ 23:00) (109/67 - 115/66)  BP(mean): 77 (01-13-19 @ 23:00) (77 - 78)  ABP: 87/48 (01-14-19 @ 04:00) (81/61 - 128/74)  ABP(mean): 64 (01-14-19 @ 04:00) (62 - 95)  Wt(kg): --    Exam: regular rate and rhythm  Cardiac Rhythm on tele: sinus  Perfusion     [x]Adequate   [ ]Inadequate  Mentation   [x]Normal       [ ]Reduced  Extremities  [x]Warm         [ ]Cool    Volume Status [ ]Hypervolemic [x]Euvolemic [ ]Hypovolemic    Meds:   amLODIPine   Tablet 10 milliGRAM(s) Oral daily  furosemide   Injectable 40 milliGRAM(s) IV Push daily  metoprolol tartrate 25 milliGRAM(s) Enteral Tube two times a day    GI/NUTRITION  Exam: soft, nontender, nondistended, incision C/D/I    Diet: tube feeds with Glucerna 44 ml/hr for 22 hrs     Meds: pantoprazole   Suspension 40 milliGRAM(s) Oral daily      GENITOURINARY  I&O's Detail    01-13 @ 07:01  -  01-14 @ 07:00  --------------------------------------------------------  IN:    dexmedetomidine Infusion: 114.8 mL    Glucerna: 779 mL    IV PiggyBack: 200 mL    propofol Infusion: 23.5 mL  Total IN: 1117.3 mL    OUT:    Indwelling Catheter - Urethral: 2690 mL  Total OUT: 2690 mL    Total NET: -1572.7 mL    01-14    143  |  105  |  18  ----------------------------<  98  3.1<L>   |  24  |  1.12    Creatinine Trend: 1.12<--, 1.03<--, 1.06<--, 0.94<--, 0.81<--, 0.89<--    Ca    8.3<L>      14 Jan 2019 03:45  Phos  3.5     01-14  Mg     2.0     01-14    [ x] Iraheta catheter, indication: uop monitoring in critical care pt     Meds: lactated ringers Bolus 500 milliLiter(s) IV Bolus once for hypotension   potassium chloride  20 mEq/100 mL IVPB 20 milliEquivalent(s) IV Intermittent every 2 hours    HEMATOLOGIC    [x] VTE Prophylaxis  heparin  Injectable 5000 Unit(s) SubCutaneous every 8 hours                          7.1    8.68  )-----------( 362      ( 14 Jan 2019 03:45 )             23.4     Transfusion     [ ] PRBC   [ ] Platelets   [ ] FFP   [ ] Cryoprecipitate      INFECTIOUS DISEASES    T(C): 38.2 (14 Jan 2019 00:00), Max: 38.2 (14 Jan 2019 00:00)  T(F): 100.8 (14 Jan 2019 00:00), Max: 100.8 (14 Jan 2019 00:00)    WBC Count: 8.68 K/uL (01-14 @ 03:45)  WBC Count: 9.97 K/uL (01-13 @ 03:11)  WBC Count: 9.73 K/uL (01-12 @ 02:10)  WBC Count: 10.44 K/uL (01-11 @ 02:10)  WBC Count: 13.83 K/uL (01-10 @ 02:45)    RECENT CULTURES:  BLOOD PERIPHERAL  01-11 @ 21:31 --  E faecium in aerobic bottle after 14 hours  BLOOD CULTURE PCR  Enterococcus faecium    Meds: influenza   Vaccine 0.5 milliLiter(s) IntraMuscular once  piperacillin/tazobactam IVPB. 3.375 Gram(s) IV Intermittent every 8 hours  vancomycin  IVPB      vancomycin  IVPB 1000 milliGRAM(s) IV Intermittent every 12 hours  vanco trough pre 3rd dose      ENDOCRINE    CAPILLARY BLOOD GLUCOSE    POCT Blood Glucose.: 276 mg/dL (14 Jan 2019 11:46)  POCT Blood Glucose.: 103 mg/dL (14 Jan 2019 06:50)  POCT Blood Glucose.: 135 mg/dL (13 Jan 2019 23:40)  POCT Blood Glucose.: 51 mg/dL (13 Jan 2019 18:08)    Meds:   dextrose 50% Injectable 25 milliLiter(s) IV Push once for hypoglycemia to 51  hydrocortisone sodium succinate Injectable 25 milliGRAM(s) IV Push every 12 hours  insulin lispro (HumaLOG) corrective regimen sliding scale   SubCutaneous every 6 hours  insulin NPH human recombinant 14 Unit(s) SubCutaneous every 6 hours  levothyroxine 137 MICROGram(s) Oral daily  simvastatin 20 milliGRAM(s) Oral at bedtime    ACCESS DEVICES:  [ ] Peripheral IV  [x ] Central Venous Line	[ ] R	[ x] L	[ x] IJ	[ ] Fem	[ ] SC	Placed: 1/13  [x ] Arterial Line		[ ] R	[ ] L	[ ] Fem	[ ] Rad	[ ] Ax	Placed: 1/9  [ ] PICC:					[ ] Mediport  [x ] Urinary Catheter iraheta, Date Placed:   [x ]feeding tube   [x] ET placed:1/5  [x] Necessity of urinary, arterial, and venous catheters discussed    OTHER MEDICATIONS:  chlorhexidine 0.12% Liquid 15 milliLiter(s) Swish and Spit two times a day  chlorhexidine 4% Liquid 1 Application(s) Topical <User Schedule>      CODE STATUS: full      IMAGING:  < from: CT Abdomen and Pelvis w/ Oral Cont and w/ IV Cont (01.12.19 @ 11:24) >  IMPRESSION:   Ventral midline diastasis and open wound without evidence for discrete   abscess or drainable collection.    Multifocal pneumonia, with increased consolidation in the right middle   and left lower lobes since 1/2/2018.    < end of copied text >

## 2019-01-14 NOTE — PROGRESS NOTE ADULT - SUBJECTIVE AND OBJECTIVE BOX
Chief Complaint: DM2/steroid/hypothyroid    History: Continues on enteral feeds Glucerna @ 45 cc/hour x 22 hours.  Hypoglycemia noted yesterday.    MEDICATIONS  (STANDING):  ALBUTerol    90 MICROgram(s) HFA Inhaler 1 Puff(s) Inhalation every 6 hours  amLODIPine   Tablet 10 milliGRAM(s) Oral daily  buDESOnide 160 MICROgram(s)/formoterol 4.5 MICROgram(s) Inhaler 2 Puff(s) Inhalation two times a day  chlorhexidine 0.12% Liquid 15 milliLiter(s) Swish and Spit two times a day  chlorhexidine 4% Liquid 1 Application(s) Topical <User Schedule>  dexmedetomidine Infusion 0.3 MICROgram(s)/kG/Hr (8.415 mL/Hr) IV Continuous <Continuous>  dextrose 50% Injectable 25 milliLiter(s) IV Push once  furosemide   Injectable 40 milliGRAM(s) IV Push daily  gabapentin   Solution 300 milliGRAM(s) Oral two times a day  heparin  Injectable 5000 Unit(s) SubCutaneous every 8 hours  hydrocortisone sodium succinate Injectable 25 milliGRAM(s) IV Push every 12 hours  influenza   Vaccine 0.5 milliLiter(s) IntraMuscular once  insulin lispro (HumaLOG) corrective regimen sliding scale   SubCutaneous every 6 hours  insulin NPH human recombinant 14 Unit(s) SubCutaneous every 6 hours  levothyroxine 137 MICROGram(s) Oral daily  metoprolol tartrate 25 milliGRAM(s) Enteral Tube two times a day  pantoprazole   Suspension 40 milliGRAM(s) Oral daily  piperacillin/tazobactam IVPB. 3.375 Gram(s) IV Intermittent every 8 hours  simvastatin 20 milliGRAM(s) Oral at bedtime  sodium chloride 3%  Inhalation 4 milliLiter(s) Inhalation every 6 hours  vancomycin  IVPB      vancomycin  IVPB 1000 milliGRAM(s) IV Intermittent every 12 hours    MEDICATIONS  (PRN):  HYDROmorphone  Injectable 0.5 milliGRAM(s) IV Push every 4 hours PRN Pain unrelieved by PO  LORazepam     Tablet 0.5 milliGRAM(s) Oral daily PRN Anxiety  oxyCODONE    Solution 10 milliGRAM(s) Enteral Tube every 4 hours PRN Severe Pain (7 - 10)  oxyCODONE    Solution 5 milliGRAM(s) Enteral Tube every 4 hours PRN Moderate Pain (4 - 6)      Allergies    No Known Allergies    Intolerances      Review of Systems:    UNABLE TO OBTAIN    PHYSICAL EXAM:  VITALS: T(C): 37.9 (01-14-19 @ 12:00)  T(F): 100.3 (01-14-19 @ 12:00), Max: 100.8 (01-14-19 @ 00:00)  HR: 89 (01-14-19 @ 16:24) (68 - 99)  BP: 109/67 (01-13-19 @ 23:00) (109/67 - 109/67)  RR:  (19 - 25)  SpO2:  (93% - 100%)  Wt(kg): --  GENERAL: NAD, sedated, cushingoid appearance  HEENT:  ETT in place  RESPIRATORY: On vent  CARDIOVASCULAR: Regular rate and rhythm  PSYCH: cannot assess      CAPILLARY BLOOD GLUCOSE      POCT Blood Glucose.: 276 mg/dL (14 Jan 2019 11:46)  POCT Blood Glucose.: 103 mg/dL (14 Jan 2019 06:50)  POCT Blood Glucose.: 135 mg/dL (13 Jan 2019 23:40)  POCT Blood Glucose.: 51 mg/dL (13 Jan 2019 18:08)      01-14    143  |  105  |  18  ----------------------------<  98  3.1<L>   |  24  |  1.12    EGFR if : 61  EGFR if non : 53    Ca    8.3<L>      01-14  Mg     2.0     01-14  Phos  3.5     01-14            Thyroid Function Tests:  12-27 @ 07:15 TSH -- FreeT4 1.18 T3 -- Anti TPO -- Anti Thyroglobulin Ab -- TSI --  12-20 @ 06:45 TSH 40.15 FreeT4 0.98 T3 -- Anti TPO -- Anti Thyroglobulin Ab -- TSI --      Hemoglobin A1C, Whole Blood: 7.1 % <H> [4.0 - 5.6] (12-17-18 @ 03:50)  Hemoglobin A1C, Whole Blood: 7.3 % <H> [4.0 - 5.6] (12-16-18 @ 04:17)

## 2019-01-14 NOTE — PROGRESS NOTE ADULT - ASSESSMENT
63F with PMHx rheumatoid arthritis (on chronic Prednisone 10mg QD x30 years), morbid obesity, CHRIS/OHS on qhs NIV bilevel, former smoker with COPD and pulmHTN (WHO I/III) who presented to [facility] 12/15 with acute onset abdominal pain and NBNB vomiting 2/2 SBO s/p emergent exploratory laparotomy with lysis of adhesions (12/16) c/b persistent episodes of nausea/vomiting, repeat OR on 12/30 with prolonged intubation. Was then transferred ot RCU after extubation where she developed progressive respiratory distress was found to be Influenza A+ adn required intubation for respiratory failure. Has been on Cpap since this morning with good tidal volumes. Increased Peep with goal of lung recruitment. Currently comfortable and interactive, on minimal sedation (fentaly used given hx of chronic pain).   - Out of bed to chair  - continue CPAP trial  - Diuresis with Diamox, to help correct the metabolic alkalosis (concern she will had a decreased ventilatory drive if extubated)  - If ABG improves (improved pH) with Diamox, agree with plan for extubation tomorrow  - Agree with current inhaler therapy: Symbicort 160-4.5 mcg 2 puffs BID and Spiriva 18mcg qhs. Can c/w Albuterol inhaler prn. No indication for escalation of therapy.  - Continue with NaCl nebs and chest PT  Colleen Tidwlel MD  Pulmonary & Critical Care Medicine Fellow | PGY-4  [phone]

## 2019-01-14 NOTE — PROGRESS NOTE ADULT - PROBLEM SELECTOR PLAN 1
Hypoglycemia noted yesterday.  Would decrease NPH to 13 units q6h. Hold if feeds are held.  -Would lower correction scale to low Humalog scale q6h  Goal glucose 100-180

## 2019-01-14 NOTE — PROGRESS NOTE ADULT - ATTENDING COMMENTS
I have personally interviewed and examined this patient, reviewed pertinent labs and imaging, and discussed the case with colleagues, residents, physician assistants, and nurses on SICU rounds.     40  minutes in total were spent in providing direct critical care for the diagnoses, assessment and plan outlined below.  These diagnoses are unrelated to the surgical procedure.  Additionally, time spent in the performance of separately billable procedures was not counted toward the critical care time.  There is no overlap.    The active critical care issues are:  1. acute on chronic pain, anxiety-add ativan as needed  2. COPD exacerbation with viral pneumonia-continue steroids, vent support  3. hypotension likely 2/2 sedative needed for safety to prevent another unplanned extubation until GOC can be clarified, wean off propofol  4. malnutrition and hyperglycemia-adjust to bolus feeds with bolus insulin regimen  5. multifactorial anemia-limit blood draws

## 2019-01-15 LAB
ANION GAP SERPL CALC-SCNC: 14 MEQ/L — SIGNIFICANT CHANGE UP (ref 7–14)
ANISOCYTOSIS BLD QL: SLIGHT — SIGNIFICANT CHANGE UP
BASE EXCESS BLDA CALC-SCNC: 3.7 MMOL/L — SIGNIFICANT CHANGE UP
BASOPHILS # BLD AUTO: 0.02 K/UL — SIGNIFICANT CHANGE UP (ref 0–0.2)
BASOPHILS NFR BLD AUTO: 0.2 % — SIGNIFICANT CHANGE UP (ref 0–2)
BASOPHILS NFR SPEC: 0 % — SIGNIFICANT CHANGE UP (ref 0–2)
BLASTS # FLD: 0 % — SIGNIFICANT CHANGE UP (ref 0–0)
BUN SERPL-MCNC: 17 MG/DL — SIGNIFICANT CHANGE UP (ref 7–23)
CA-I BLDA-SCNC: 1.13 MMOL/L — LOW (ref 1.15–1.29)
CALCIUM SERPL-MCNC: 8.4 MG/DL — SIGNIFICANT CHANGE UP (ref 8.4–10.5)
CHLORIDE SERPL-SCNC: 105 MMOL/L — SIGNIFICANT CHANGE UP (ref 98–107)
CO2 SERPL-SCNC: 24 MMOL/L — SIGNIFICANT CHANGE UP (ref 22–31)
CREAT SERPL-MCNC: 0.91 MG/DL — SIGNIFICANT CHANGE UP (ref 0.5–1.3)
EOSINOPHIL # BLD AUTO: 0.06 K/UL — SIGNIFICANT CHANGE UP (ref 0–0.5)
EOSINOPHIL NFR BLD AUTO: 0.7 % — SIGNIFICANT CHANGE UP (ref 0–6)
EOSINOPHIL NFR FLD: 0 % — SIGNIFICANT CHANGE UP (ref 0–6)
GIANT PLATELETS BLD QL SMEAR: PRESENT — SIGNIFICANT CHANGE UP
GLUCOSE BLDA-MCNC: 189 MG/DL — HIGH (ref 70–99)
GLUCOSE BLDC GLUCOMTR-MCNC: 109 MG/DL — HIGH (ref 70–99)
GLUCOSE BLDC GLUCOMTR-MCNC: 145 MG/DL — HIGH (ref 70–99)
GLUCOSE BLDC GLUCOMTR-MCNC: 228 MG/DL — HIGH (ref 70–99)
GLUCOSE BLDC GLUCOMTR-MCNC: 232 MG/DL — HIGH (ref 70–99)
GLUCOSE SERPL-MCNC: 192 MG/DL — HIGH (ref 70–99)
HCO3 BLDA-SCNC: 28 MMOL/L — HIGH (ref 22–26)
HCT VFR BLD CALC: 24.9 % — LOW (ref 34.5–45)
HCT VFR BLDA CALC: 22.9 % — LOW (ref 34.5–46.5)
HGB BLD-MCNC: 7.6 G/DL — LOW (ref 11.5–15.5)
HGB BLDA-MCNC: 7.3 G/DL — LOW (ref 11.5–15.5)
IMM GRANULOCYTES NFR BLD AUTO: 2.3 % — HIGH (ref 0–1.5)
LACTATE BLDA-SCNC: 1.7 MMOL/L — SIGNIFICANT CHANGE UP (ref 0.5–2)
LYMPHOCYTES # BLD AUTO: 1.02 K/UL — SIGNIFICANT CHANGE UP (ref 1–3.3)
LYMPHOCYTES # BLD AUTO: 12.3 % — LOW (ref 13–44)
LYMPHOCYTES NFR SPEC AUTO: 12.3 % — LOW (ref 13–44)
MACROCYTES BLD QL: SLIGHT — SIGNIFICANT CHANGE UP
MAGNESIUM SERPL-MCNC: 2.2 MG/DL — SIGNIFICANT CHANGE UP (ref 1.6–2.6)
MCHC RBC-ENTMCNC: 30.2 PG — SIGNIFICANT CHANGE UP (ref 27–34)
MCHC RBC-ENTMCNC: 30.5 % — LOW (ref 32–36)
MCV RBC AUTO: 98.8 FL — SIGNIFICANT CHANGE UP (ref 80–100)
METAMYELOCYTES # FLD: 0 % — SIGNIFICANT CHANGE UP (ref 0–1)
MONOCYTES # BLD AUTO: 0.76 K/UL — SIGNIFICANT CHANGE UP (ref 0–0.9)
MONOCYTES NFR BLD AUTO: 9.2 % — SIGNIFICANT CHANGE UP (ref 2–14)
MONOCYTES NFR BLD: 9.6 % — HIGH (ref 2–9)
MYELOCYTES NFR BLD: 0 % — SIGNIFICANT CHANGE UP (ref 0–0)
NEUTROPHIL AB SER-ACNC: 78.1 % — HIGH (ref 43–77)
NEUTROPHILS # BLD AUTO: 6.25 K/UL — SIGNIFICANT CHANGE UP (ref 1.8–7.4)
NEUTROPHILS NFR BLD AUTO: 75.3 % — SIGNIFICANT CHANGE UP (ref 43–77)
NEUTS BAND # BLD: 0 % — SIGNIFICANT CHANGE UP (ref 0–6)
NRBC # BLD: 0.9 /100WBC — SIGNIFICANT CHANGE UP
NRBC # FLD: 0.06 K/UL — LOW (ref 25–125)
OTHER - HEMATOLOGY %: 0 — SIGNIFICANT CHANGE UP
PCO2 BLDA: 37 MMHG — SIGNIFICANT CHANGE UP (ref 32–48)
PH BLDA: 7.48 PH — HIGH (ref 7.35–7.45)
PHOSPHATE SERPL-MCNC: 2.5 MG/DL — SIGNIFICANT CHANGE UP (ref 2.5–4.5)
PLATELET # BLD AUTO: 358 K/UL — SIGNIFICANT CHANGE UP (ref 150–400)
PLATELET COUNT - ESTIMATE: NORMAL — SIGNIFICANT CHANGE UP
PMV BLD: 9.8 FL — SIGNIFICANT CHANGE UP (ref 7–13)
PO2 BLDA: 99 MMHG — SIGNIFICANT CHANGE UP (ref 83–108)
POLYCHROMASIA BLD QL SMEAR: SLIGHT — SIGNIFICANT CHANGE UP
POTASSIUM BLDA-SCNC: 3.2 MMOL/L — LOW (ref 3.4–4.5)
POTASSIUM SERPL-MCNC: 3.6 MMOL/L — SIGNIFICANT CHANGE UP (ref 3.5–5.3)
POTASSIUM SERPL-SCNC: 3.6 MMOL/L — SIGNIFICANT CHANGE UP (ref 3.5–5.3)
PROMYELOCYTES # FLD: 0 % — SIGNIFICANT CHANGE UP (ref 0–0)
RBC # BLD: 2.52 M/UL — LOW (ref 3.8–5.2)
RBC # FLD: 16.2 % — HIGH (ref 10.3–14.5)
SAO2 % BLDA: 95.5 % — SIGNIFICANT CHANGE UP (ref 95–99)
SODIUM BLDA-SCNC: 138 MMOL/L — SIGNIFICANT CHANGE UP (ref 136–146)
SODIUM SERPL-SCNC: 143 MMOL/L — SIGNIFICANT CHANGE UP (ref 135–145)
VARIANT LYMPHS # BLD: 0 % — SIGNIFICANT CHANGE UP
WBC # BLD: 8.3 K/UL — SIGNIFICANT CHANGE UP (ref 3.8–10.5)
WBC # FLD AUTO: 8.3 K/UL — SIGNIFICANT CHANGE UP (ref 3.8–10.5)

## 2019-01-15 PROCEDURE — 71045 X-RAY EXAM CHEST 1 VIEW: CPT | Mod: 26

## 2019-01-15 PROCEDURE — 99233 SBSQ HOSP IP/OBS HIGH 50: CPT

## 2019-01-15 RX ORDER — POTASSIUM CHLORIDE 20 MEQ
40 PACKET (EA) ORAL ONCE
Qty: 0 | Refills: 0 | Status: COMPLETED | OUTPATIENT
Start: 2019-01-15 | End: 2019-01-15

## 2019-01-15 RX ORDER — INSULIN GLARGINE 100 [IU]/ML
22 INJECTION, SOLUTION SUBCUTANEOUS AT BEDTIME
Qty: 0 | Refills: 0 | Status: DISCONTINUED | OUTPATIENT
Start: 2019-01-15 | End: 2019-01-21

## 2019-01-15 RX ORDER — INSULIN LISPRO 100/ML
10 VIAL (ML) SUBCUTANEOUS EVERY 8 HOURS
Qty: 0 | Refills: 0 | Status: DISCONTINUED | OUTPATIENT
Start: 2019-01-15 | End: 2019-01-27

## 2019-01-15 RX ORDER — INSULIN LISPRO 100/ML
10 VIAL (ML) SUBCUTANEOUS
Qty: 0 | Refills: 0 | Status: DISCONTINUED | OUTPATIENT
Start: 2019-01-15 | End: 2019-01-15

## 2019-01-15 RX ORDER — FUROSEMIDE 40 MG
40 TABLET ORAL ONCE
Qty: 0 | Refills: 0 | Status: COMPLETED | OUTPATIENT
Start: 2019-01-15 | End: 2019-01-15

## 2019-01-15 RX ORDER — HUMAN INSULIN 100 [IU]/ML
10 INJECTION, SUSPENSION SUBCUTANEOUS EVERY 6 HOURS
Qty: 0 | Refills: 0 | Status: DISCONTINUED | OUTPATIENT
Start: 2019-01-15 | End: 2019-01-15

## 2019-01-15 RX ORDER — ENOXAPARIN SODIUM 100 MG/ML
40 INJECTION SUBCUTANEOUS DAILY
Qty: 0 | Refills: 0 | Status: DISCONTINUED | OUTPATIENT
Start: 2019-01-15 | End: 2019-01-23

## 2019-01-15 RX ORDER — HYDROMORPHONE HYDROCHLORIDE 2 MG/ML
1 INJECTION INTRAMUSCULAR; INTRAVENOUS; SUBCUTANEOUS EVERY 4 HOURS
Qty: 0 | Refills: 0 | Status: DISCONTINUED | OUTPATIENT
Start: 2019-01-15 | End: 2019-01-16

## 2019-01-15 RX ORDER — SODIUM CHLORIDE 9 MG/ML
3 INJECTION INTRAMUSCULAR; INTRAVENOUS; SUBCUTANEOUS
Qty: 0 | Refills: 0 | Status: DISCONTINUED | OUTPATIENT
Start: 2019-01-15 | End: 2019-01-31

## 2019-01-15 RX ORDER — INSULIN LISPRO 100/ML
5 VIAL (ML) SUBCUTANEOUS ONCE
Qty: 0 | Refills: 0 | Status: COMPLETED | OUTPATIENT
Start: 2019-01-15 | End: 2019-01-15

## 2019-01-15 RX ORDER — INSULIN LISPRO 100/ML
VIAL (ML) SUBCUTANEOUS EVERY 8 HOURS
Qty: 0 | Refills: 0 | Status: DISCONTINUED | OUTPATIENT
Start: 2019-01-15 | End: 2019-01-28

## 2019-01-15 RX ADMIN — CHLORHEXIDINE GLUCONATE 15 MILLILITER(S): 213 SOLUTION TOPICAL at 05:30

## 2019-01-15 RX ADMIN — OXYCODONE HYDROCHLORIDE 10 MILLIGRAM(S): 5 TABLET ORAL at 19:11

## 2019-01-15 RX ADMIN — OXYCODONE HYDROCHLORIDE 10 MILLIGRAM(S): 5 TABLET ORAL at 10:33

## 2019-01-15 RX ADMIN — OXYCODONE HYDROCHLORIDE 10 MILLIGRAM(S): 5 TABLET ORAL at 06:25

## 2019-01-15 RX ADMIN — PIPERACILLIN AND TAZOBACTAM 25 GRAM(S): 4; .5 INJECTION, POWDER, LYOPHILIZED, FOR SOLUTION INTRAVENOUS at 02:26

## 2019-01-15 RX ADMIN — Medication 40 MILLIEQUIVALENT(S): at 05:28

## 2019-01-15 RX ADMIN — INSULIN GLARGINE 22 UNIT(S): 100 INJECTION, SOLUTION SUBCUTANEOUS at 21:37

## 2019-01-15 RX ADMIN — BUDESONIDE AND FORMOTEROL FUMARATE DIHYDRATE 2 PUFF(S): 160; 4.5 AEROSOL RESPIRATORY (INHALATION) at 11:58

## 2019-01-15 RX ADMIN — HYDROMORPHONE HYDROCHLORIDE 1 MILLIGRAM(S): 2 INJECTION INTRAMUSCULAR; INTRAVENOUS; SUBCUTANEOUS at 12:15

## 2019-01-15 RX ADMIN — OXYCODONE HYDROCHLORIDE 10 MILLIGRAM(S): 5 TABLET ORAL at 14:28

## 2019-01-15 RX ADMIN — Medication 0.5 MILLIGRAM(S): at 07:53

## 2019-01-15 RX ADMIN — GABAPENTIN 300 MILLIGRAM(S): 400 CAPSULE ORAL at 05:29

## 2019-01-15 RX ADMIN — HYDROMORPHONE HYDROCHLORIDE 1 MILLIGRAM(S): 2 INJECTION INTRAMUSCULAR; INTRAVENOUS; SUBCUTANEOUS at 21:52

## 2019-01-15 RX ADMIN — CHLORHEXIDINE GLUCONATE 1 APPLICATION(S): 213 SOLUTION TOPICAL at 15:00

## 2019-01-15 RX ADMIN — OXYCODONE HYDROCHLORIDE 10 MILLIGRAM(S): 5 TABLET ORAL at 18:56

## 2019-01-15 RX ADMIN — SODIUM CHLORIDE 4 MILLILITER(S): 9 INJECTION INTRAMUSCULAR; INTRAVENOUS; SUBCUTANEOUS at 05:45

## 2019-01-15 RX ADMIN — HYDROMORPHONE HYDROCHLORIDE 1 MILLIGRAM(S): 2 INJECTION INTRAMUSCULAR; INTRAVENOUS; SUBCUTANEOUS at 17:52

## 2019-01-15 RX ADMIN — HEPARIN SODIUM 5000 UNIT(S): 5000 INJECTION INTRAVENOUS; SUBCUTANEOUS at 05:29

## 2019-01-15 RX ADMIN — Medication 5 UNIT(S): at 18:44

## 2019-01-15 RX ADMIN — ALBUTEROL 1 PUFF(S): 90 AEROSOL, METERED ORAL at 05:40

## 2019-01-15 RX ADMIN — BUDESONIDE AND FORMOTEROL FUMARATE DIHYDRATE 2 PUFF(S): 160; 4.5 AEROSOL RESPIRATORY (INHALATION) at 22:13

## 2019-01-15 RX ADMIN — SIMVASTATIN 20 MILLIGRAM(S): 20 TABLET, FILM COATED ORAL at 23:03

## 2019-01-15 RX ADMIN — HYDROMORPHONE HYDROCHLORIDE 0.5 MILLIGRAM(S): 2 INJECTION INTRAMUSCULAR; INTRAVENOUS; SUBCUTANEOUS at 09:05

## 2019-01-15 RX ADMIN — ALBUTEROL 1 PUFF(S): 90 AEROSOL, METERED ORAL at 22:01

## 2019-01-15 RX ADMIN — PANTOPRAZOLE SODIUM 40 MILLIGRAM(S): 20 TABLET, DELAYED RELEASE ORAL at 11:47

## 2019-01-15 RX ADMIN — Medication 137 MICROGRAM(S): at 05:29

## 2019-01-15 RX ADMIN — OXYCODONE HYDROCHLORIDE 10 MILLIGRAM(S): 5 TABLET ORAL at 10:08

## 2019-01-15 RX ADMIN — Medication 250 MILLIGRAM(S): at 05:29

## 2019-01-15 RX ADMIN — PIPERACILLIN AND TAZOBACTAM 25 GRAM(S): 4; .5 INJECTION, POWDER, LYOPHILIZED, FOR SOLUTION INTRAVENOUS at 17:37

## 2019-01-15 RX ADMIN — CHLORHEXIDINE GLUCONATE 15 MILLILITER(S): 213 SOLUTION TOPICAL at 10:30

## 2019-01-15 RX ADMIN — HYDROMORPHONE HYDROCHLORIDE 1 MILLIGRAM(S): 2 INJECTION INTRAMUSCULAR; INTRAVENOUS; SUBCUTANEOUS at 21:37

## 2019-01-15 RX ADMIN — HUMAN INSULIN 14 UNIT(S): 100 INJECTION, SUSPENSION SUBCUTANEOUS at 05:30

## 2019-01-15 RX ADMIN — Medication 25 MILLIGRAM(S): at 05:29

## 2019-01-15 RX ADMIN — Medication 25 MILLIGRAM(S): at 18:05

## 2019-01-15 RX ADMIN — Medication 40 MILLIGRAM(S): at 05:29

## 2019-01-15 RX ADMIN — GABAPENTIN 300 MILLIGRAM(S): 400 CAPSULE ORAL at 17:37

## 2019-01-15 RX ADMIN — ALBUTEROL 1 PUFF(S): 90 AEROSOL, METERED ORAL at 15:57

## 2019-01-15 RX ADMIN — Medication 1 MILLIGRAM(S): at 12:17

## 2019-01-15 RX ADMIN — Medication 2: at 00:13

## 2019-01-15 RX ADMIN — Medication 2: at 12:22

## 2019-01-15 RX ADMIN — OXYCODONE HYDROCHLORIDE 10 MILLIGRAM(S): 5 TABLET ORAL at 05:42

## 2019-01-15 RX ADMIN — HYDROMORPHONE HYDROCHLORIDE 1 MILLIGRAM(S): 2 INJECTION INTRAMUSCULAR; INTRAVENOUS; SUBCUTANEOUS at 12:30

## 2019-01-15 RX ADMIN — OXYCODONE HYDROCHLORIDE 10 MILLIGRAM(S): 5 TABLET ORAL at 00:00

## 2019-01-15 RX ADMIN — ALBUTEROL 1 PUFF(S): 90 AEROSOL, METERED ORAL at 11:58

## 2019-01-15 RX ADMIN — Medication 40 MILLIGRAM(S): at 12:22

## 2019-01-15 RX ADMIN — SODIUM CHLORIDE 3 MILLILITER(S): 9 INJECTION INTRAMUSCULAR; INTRAVENOUS; SUBCUTANEOUS at 22:04

## 2019-01-15 RX ADMIN — OXYCODONE HYDROCHLORIDE 10 MILLIGRAM(S): 5 TABLET ORAL at 15:28

## 2019-01-15 RX ADMIN — HYDROMORPHONE HYDROCHLORIDE 0.5 MILLIGRAM(S): 2 INJECTION INTRAMUSCULAR; INTRAVENOUS; SUBCUTANEOUS at 03:41

## 2019-01-15 RX ADMIN — Medication 0.5 MILLIGRAM(S): at 21:35

## 2019-01-15 RX ADMIN — PIPERACILLIN AND TAZOBACTAM 25 GRAM(S): 4; .5 INJECTION, POWDER, LYOPHILIZED, FOR SOLUTION INTRAVENOUS at 10:08

## 2019-01-15 RX ADMIN — HYDROMORPHONE HYDROCHLORIDE 1 MILLIGRAM(S): 2 INJECTION INTRAMUSCULAR; INTRAVENOUS; SUBCUTANEOUS at 17:37

## 2019-01-15 RX ADMIN — HUMAN INSULIN 14 UNIT(S): 100 INJECTION, SUSPENSION SUBCUTANEOUS at 00:14

## 2019-01-15 RX ADMIN — OXYCODONE HYDROCHLORIDE 10 MILLIGRAM(S): 5 TABLET ORAL at 00:14

## 2019-01-15 RX ADMIN — HYDROMORPHONE HYDROCHLORIDE 0.5 MILLIGRAM(S): 2 INJECTION INTRAMUSCULAR; INTRAVENOUS; SUBCUTANEOUS at 09:20

## 2019-01-15 RX ADMIN — HYDROMORPHONE HYDROCHLORIDE 0.5 MILLIGRAM(S): 2 INJECTION INTRAMUSCULAR; INTRAVENOUS; SUBCUTANEOUS at 03:55

## 2019-01-15 NOTE — PROGRESS NOTE ADULT - SUBJECTIVE AND OBJECTIVE BOX
B-Team Surgery Progress Note     Subjective/24hour Events: Patient currently intubated. Right IJ line removed and left IJ line inserted. Propofol gtt discontinued due to BP downtrend. Pain controlled. Currently NPO w/ tube feeds.     Vital Signs:  Vital Signs Last 24 Hrs  T(C): 36.5 (15 Soy 2019 08:00), Max: 37.9 (14 Jan 2019 12:00)  T(F): 97.7 (15 Soy 2019 08:00), Max: 100.3 (14 Jan 2019 12:00)  HR: 102 (15 Soy 2019 10:00) (56 - 102)  BP: 129/62 (15 Soy 2019 10:00) (110/67 - 133/87)  BP(mean): 75 (15 Soy 2019 10:00) (74 - 98)  RR: 25 (15 Soy 2019 10:00) (21 - 25)  SpO2: 94% (15 Soy 2019 10:00) (92% - 100%)    CAPILLARY BLOOD GLUCOSE      POCT Blood Glucose.: 145 mg/dL (15 Soy 2019 05:12)  POCT Blood Glucose.: 228 mg/dL (15 Soy 2019 00:11)  POCT Blood Glucose.: 141 mg/dL (14 Jan 2019 18:39)  POCT Blood Glucose.: 276 mg/dL (14 Jan 2019 11:46)      I&O's Detail    14 Jan 2019 07:01  -  15 Soy 2019 07:00  --------------------------------------------------------  IN:    dexmedetomidine Infusion: 229.6 mL    Enteral Tube Flush: 400 mL    Glucerna: 765 mL    IV PiggyBack: 750 mL  Total IN: 2144.6 mL    OUT:    Indwelling Catheter - Urethral: 1585 mL    Rectal Tube: 250 mL  Total OUT: 1835 mL    Total NET: 309.6 mL      15 Soy 2019 07:01  -  15 Soy 2019 10:31  --------------------------------------------------------  IN:    Enteral Tube Flush: 70 mL    Glucerna: 440 mL    IV PiggyBack: 100 mL  Total IN: 610 mL    OUT:    Indwelling Catheter - Urethral: 570 mL  Total OUT: 570 mL    Total NET: 40 mL            MEDICATIONS  (STANDING):  ALBUTerol    90 MICROgram(s) HFA Inhaler 1 Puff(s) Inhalation every 6 hours  amLODIPine   Tablet 10 milliGRAM(s) Oral daily  buDESOnide 160 MICROgram(s)/formoterol 4.5 MICROgram(s) Inhaler 2 Puff(s) Inhalation two times a day  chlorhexidine 0.12% Liquid 15 milliLiter(s) Swish and Spit two times a day  chlorhexidine 4% Liquid 1 Application(s) Topical <User Schedule>  dexmedetomidine Infusion 0.3 MICROgram(s)/kG/Hr (8.415 mL/Hr) IV Continuous <Continuous>  furosemide   Injectable 40 milliGRAM(s) IV Push daily  gabapentin   Solution 300 milliGRAM(s) Oral two times a day  heparin  Injectable 5000 Unit(s) SubCutaneous every 8 hours  hydrocortisone sodium succinate Injectable 25 milliGRAM(s) IV Push every 12 hours  influenza   Vaccine 0.5 milliLiter(s) IntraMuscular once  insulin lispro (HumaLOG) corrective regimen sliding scale   SubCutaneous every 6 hours  insulin NPH human recombinant 10 Unit(s) SubCutaneous every 6 hours  levothyroxine 137 MICROGram(s) Oral daily  metoprolol tartrate 25 milliGRAM(s) Enteral Tube two times a day  pantoprazole   Suspension 40 milliGRAM(s) Oral daily  piperacillin/tazobactam IVPB. 3.375 Gram(s) IV Intermittent every 8 hours  simvastatin 20 milliGRAM(s) Oral at bedtime  sodium chloride 3%  Inhalation 4 milliLiter(s) Inhalation every 6 hours  vancomycin  IVPB 1000 milliGRAM(s) IV Intermittent every 24 hours    MEDICATIONS  (PRN):  HYDROmorphone  Injectable 0.5 milliGRAM(s) IV Push every 4 hours PRN Pain unrelieved by PO  LORazepam     Tablet 0.5 milliGRAM(s) Oral daily PRN Anxiety  oxyCODONE    Solution 10 milliGRAM(s) Enteral Tube every 4 hours PRN Severe Pain (7 - 10)  oxyCODONE    Solution 5 milliGRAM(s) Enteral Tube every 4 hours PRN Moderate Pain (4 - 6)        Physical Exam:  Gen: NAD  LS: nml respiratory effort  Card: pulse regularly present  GI: abd soft, mild tenderness, retention sutures in place, seropurulent drainage from midline wound  Ext: warm      Labs:    01-15    143  |  105  |  17  ----------------------------<  192<H>  3.6   |  24  |  0.91    Ca    8.4      15 Soy 2019 02:29  Phos  2.5     01-15  Mg     2.2     01-15                              7.6    8.30  )-----------( 358      ( 15 Soy 2019 02:29 )             24.9               Imaging:  EXAM:  XR CHEST PORTABLE ROUTINE 1V      EXAM:  XR ABDOMEN PORTABLE URGENT 1V      PROCEDURE DATE:  Jan 14 2019     INTERPRETATION:  TIME OF EXAM: January 14, 2019 at 6:42 PM; 11:49 PM    CLINICAL INFORMATION: NG tube placement; intubation.    TECHNIQUE:   Portable chest    INTERPRETATION:     6:42 PM:  Tip of the enteric tube is in the body of the stomach. Only the right   lung base is visible showing a loculated effusion.    11:49 PM:  Endotracheal and enteric tube in place in addition to a left subclavian   line with its tip in the right atrium. Bilateral peripheral airspace   opacities not improved.    COMPARISON:  January 14 at 12:19 AM    IMPRESSION:  Follow-up studies with endotracheal and enteric tubes in   place and bilateral pulmonary disease unchanged.

## 2019-01-15 NOTE — PROGRESS NOTE ADULT - SUBJECTIVE AND OBJECTIVE BOX
Follow Up:      Inverval History/ROS:Patient is a 63y old  Female who presents with a chief complaint of pain (14 Jan 2019 10:36)  + fever.  Intubated.   Awake      Allergies    No Known Allergies    Intolerances        ANTIMICROBIALS:  piperacillin/tazobactam IVPB. 3.375 every 8 hours  vancomycin  IVPB 1000 every 24 hours      OTHER MEDS:  ALBUTerol    90 MICROgram(s) HFA Inhaler 1 Puff(s) Inhalation every 6 hours  amLODIPine   Tablet 10 milliGRAM(s) Oral daily  buDESOnide 160 MICROgram(s)/formoterol 4.5 MICROgram(s) Inhaler 2 Puff(s) Inhalation two times a day  chlorhexidine 0.12% Liquid 15 milliLiter(s) Swish and Spit two times a day  chlorhexidine 4% Liquid 1 Application(s) Topical <User Schedule>  dexmedetomidine Infusion 0.3 MICROgram(s)/kG/Hr IV Continuous <Continuous>  furosemide   Injectable 40 milliGRAM(s) IV Push daily  gabapentin   Solution 300 milliGRAM(s) Oral two times a day  heparin  Injectable 5000 Unit(s) SubCutaneous every 8 hours  hydrocortisone sodium succinate Injectable 25 milliGRAM(s) IV Push every 12 hours  HYDROmorphone  Injectable 1 milliGRAM(s) IV Push every 4 hours PRN  influenza   Vaccine 0.5 milliLiter(s) IntraMuscular once  insulin glargine Injectable (LANTUS) 22 Unit(s) SubCutaneous at bedtime  insulin lispro (HumaLOG) corrective regimen sliding scale   SubCutaneous every 6 hours  insulin lispro Injectable (HumaLOG) 10 Unit(s) SubCutaneous four times a day with meals  levothyroxine 137 MICROGram(s) Oral daily  LORazepam     Tablet 0.5 milliGRAM(s) Oral daily PRN  metoprolol tartrate 25 milliGRAM(s) Enteral Tube two times a day  oxyCODONE    Solution 10 milliGRAM(s) Enteral Tube every 4 hours PRN  oxyCODONE    Solution 5 milliGRAM(s) Enteral Tube every 4 hours PRN  pantoprazole   Suspension 40 milliGRAM(s) Oral daily  simvastatin 20 milliGRAM(s) Oral at bedtime  sodium chloride 3%  Inhalation 4 milliLiter(s) Inhalation every 6 hours      Vital Signs Last 24 Hrs  T(C): 37.8 (15 Soy 2019 12:30), Max: 37.8 (15 Syo 2019 00:00)  T(F): 100 (15 Soy 2019 12:30), Max: 100 (15 Soy 2019 00:00)  HR: 115 (15 Soy 2019 12:02) (56 - 122)  BP: 132/63 (15 Soy 2019 12:00) (110/67 - 133/87)  BP(mean): 81 (15 Soy 2019 12:00) (74 - 98)  RR: 26 (15 Soy 2019 12:00) (21 - 27)  SpO2: 100% (15 Soy 2019 12:02) (92% - 100%)    PHYSICAL EXAM:  General: [x ] awake, intubated  HEAD/EYES: [ ] PERRL [x ] white sclera [ ] icterus  ENT:  [ ] normal [ x] supple [ ] thrush [ ] pharyngeal exudate  Cardiovascular:   [ ] murmur [ ] normal [ ] PPM/AICD  Respiratory:  [ x] course BS  GI:  [ x] soft, , normal bowel sounds\  Midline incision  :  [ x] iraheta [ ] no CVA tenderness   Musculoskeletal:  [x ] no synovitis  Neurologic:  [ ] non-focal exam   Skin:  [x ] no rash  Lymph: [x ] no lymphadenopathy  Psychiatric:  [ x] appropriate affect [ ] alert & oriented  Lines:  [ ] no phlebitis [x ] central line- left IJ                                7.6    8.30  )-----------( 358      ( 15 Soy 2019 02:29 )             24.9       01-15    143  |  105  |  17  ----------------------------<  192<H>  3.6   |  24  |  0.91    Ca    8.4      15 Soy 2019 02:29  Phos  2.5     01-15  Mg     2.2     01-15            MICROBIOLOGY:Culture - Respiratory:   NRF^Normal Respiratory Perla  QUANTITY OF GROWTH: MANY (01-09-19 @ 12:31)    Blood culture 1/14: testing    RADIOLOGY:

## 2019-01-15 NOTE — PROGRESS NOTE ADULT - ASSESSMENT
63F w/ multiple respiratory comorbidities p/w SBO s/p ex-lap with findings of healthy bowel c/b evisceration of bowel s/p ex-lap with placement of retention sutures, now with worsening separation at skin edges requiring SICU level care including intubation    Plan  - Pain control: dilaudid 0.5mg IV push q4h PRN, oxy 5&10 via nasogatric tube q4h PRN, Tylenol 650mg suspension PO q6h, gabapentin 300mg BID via NGT  - NPO w/ tube feeds  - Monitor GI fxn   - Monitor respiratory status  - Follow long term care plan - will likely need tracheostomy ([place] to discuss with family)      - Family meeting to take place 1/15          - f/u meeting outcome for goal of care  - DVT ppx: subQ heparin  - Appreciate SICU care

## 2019-01-15 NOTE — PROGRESS NOTE ADULT - ATTENDING COMMENTS
Family requesting another surgical attending take over care of Ms. Morrow.  Dr. Grace agreeable.  I have accepted patient for transfer to the [facility].  See SICU note for further details.    The Acute Care Surgery (B Team) Attending Group Practice:  Dr. Cole Nicole, Dr. Julio Koch, Dr. Divya Monahan, Dr. Ryan Bear    urgent issues - spectra [id number] or [id number]  nonurgent issues - [phone]  patient appointments or afterhours - [phone]

## 2019-01-15 NOTE — PROGRESS NOTE ADULT - ASSESSMENT
63 year old female with RA immunosuppressed from chronic steroids, underlying DM, presented on 12/15 with SBO.  S/p laparotomy and subsequent revision, now with respiratory failure and sepsis due to an enterococcal bacteremia.  Course/Infection complicated by underlying DM/ immuno suppression. High risk for complications.    Critically ill with Respiratory failure    1) Enterococcal bacteremia: Enterococcus in the blood is most often form a , abdominal, wound or line infection.   the patient has risk for all of the above.    Recent C ta/p without abscess but concern for midline wound infection  -Wound culture testing- gorwing gram positive  -  -S/p line change    -Repeat blood culture testing  -Consider MATEO as pt continues to have fever  -Continue vancomycin- through 20.3 yesterday    2) Abdominal Wound: retention sutures in place  Some drainage from lower pole  No abscess on CT    Wound Care  Continue vanco/ zosyn to cover gram negative/anerobe  as well-    3) Immunosuppressed: due ot steroid use  Impairs wound healing    4) DM: glycemic control

## 2019-01-15 NOTE — PROGRESS NOTE ADULT - ATTENDING COMMENTS
I have personally interviewed and examined this patient, reviewed pertinent labs and imaging, and discussed the case with colleagues, residents, physician assistants, and nurses on SICU rounds.      90   minutes in total were spent in providing direct critical care for the diagnoses, assessment and plan outlined below.  These diagnoses are unrelated to the surgical procedure.  Additionally, time spent in the performance of separately billable procedures was not counted toward the critical care time.  There is no overlap.    The active critical care issues are:  1. acute on chronic pain +/- delirium  2. acute on chronic hypercarbic and hypoxemic respiratory failure, would benefit from tracheostomy for prolonged vent weaning  3. malnourishment due to disease-related catabolism, steroids and DM  4. viral pneumonia and bacteremia  5. deconditioning, critical illness myopathy    Will adjust tube feed calories to ideal RQ to limit exacerbation of CO2 retention.  Family meeting with patient's two sons, Dr. Grace, myself, SW, SICU RNs and PA, B team chief resident was held to discuss GOC.  The sons will discuss with their mother our recommendation to proceed with trach as that seems consistent with her wish to live.  Risks, benefits of procedure explained, questions answered.  We will f/u with them tomorrow.  PFR to meet with daughter on Thursday.

## 2019-01-15 NOTE — PROGRESS NOTE ADULT - ASSESSMENT
63y Female hx of COPD presented initially with SBO, underwent surgery closed with 4 retention sutures, evicerated on 12/30 and closed with 6 more retention sutures, now c/b multifocal pneumonia, flu and respiratory distress /hypoxia on continuous bipap. Patient temporarily transferred to [facility], but developed hypercapnia and respiratory distress requiring intubation 1/6 and subsequent transfer to SICU. S/p bronch 1/9/18    #neuro  Awake, alert, follows commands, RASS 0  - wean off precedex 0.3, for discomfort and anxiety, continue ativan PRN  - Pain control w/ standing acetaminophen and hydromorphone 0.5 mg q4h, PRN oxycodone   - C/w gabapentin    #resp  Hx COPD and pulm HTN, current smoker, course c/b viral pneumonia, s/p bronch 1/9  - Intubated, on AC CMV now, FIO2 50%, try to wean down and switch to CPAP  - daily CXR AM for tube position checks  - ABG checks as appropriate  - C/w inhalers, pulmozyme, pulmonary toilet   - OOB to chair   - appreciate pulm recs     #CV  episode of hypotension last night, propofol stopped, received 500 ml IVF, HD stable now   - Continue amlodipine 10mg  - Metoprolol 25mg po BID   - Brachial arterial line removed     #GI  s/p ex-lap for evisceration, 10 retention sutures in situ, gaping wound covered in fibrinous exudate   - Daily wound check and dressing by RN  - c/w protonix daily, home med  - c/w TF 44 ml/hr, 22 hrs, 2 hr break for levothyroxine administration, transition to bolus feeds to help with sugar and diarrhea  -dietitian consult for proper calculation of calories and feeds amount   - KUB now to confirm tube tip stomach and not in pylorus     #renal  - BMP and I&O monitoring as routine   - adequate uop, remove iraheta, transition to diaper or female condom catheter  - Repletion of electrolytes PRN  - Lasix 40mg IV qd    #heme  - stable H/H, s/p 1 prbc transfusion 1/8  - sub cut heparin 5000 U q8h for dvt ppx  - b/l LE venous duplex to r/o DVT ordered     #ID  spiked fevers in last 2 days, blood cultures sent, started on zosyn and vanco  - blood cultures growing Enterococcus faecium, wound culture 1/12 growing gpc and gpr   - lines: iraheta, rt IJV (1/6) changed to lt IJV 1/13,, rt brachial A line, amaury, ET  - appreciate ID consult, ct abx  - TTE 1/12: good LV function, moderate pulm HTN, endocarditis can not be ruled out, needs MATEO if strongly suspected     #endo  - hx RA on chronic prednisone, DM, hypothyroid  -pt had one hypoglycemia episode, received MPH 7 U at midnight, sugars not elevated   - Hydrocortisone 25 mg BID, do not titrate further down as this is nearly equivalent to home dose   - Synthroid 137mcg qhs through ngt   - NPH 14u q6h while 22 hr continuous tube feeds continued, moderate dose corrective scale q6h changed to low dose  - Endo following, appreciate recs    Lines:  -d/c A line, EtCO2 monitoring through ET and saturation monitoring with pulseox    #dispo  -SICU  -GOC discussion with family and confirm HCP 63y Female hx of COPD presented initially with SBO, underwent surgery closed with 4 retention sutures, evicerated on 12/30 and closed with 6 more retention sutures, now c/b multifocal pneumonia, flu and respiratory distress /hypoxia on continuous bipap. Patient temporarily transferred to RCU, but developed hypercapnia and respiratory distress requiring intubation 1/6 and subsequent transfer to SICU. S/p bronch 1/9/18    #neuro  Awake, alert, follows commands, RASS 0  - wean off precedex 0.3, for discomfort and anxiety, continue ativan PRN  - Pain control w/ standing acetaminophen and hydromorphone 0.5 mg q4h, PRN oxycodone   - C/w gabapentin    #resp  Hx COPD and pulm HTN, current smoker, course c/b viral pneumonia, s/p bronch 1/9  - Intubated, on AC CMV now, FIO2 50%, try to wean down and switch to CPAP  - daily CXR AM for tube position checks  - C/w inhalers, pulmozyme, pulmonary toilet   - OOB to chair   - appreciate pulm recs     #CV  HD stable  - Continue amlodipine 10mg  - Metoprolol 25mg po BID   - Brachial arterial line removed     #GI  s/p ex-lap for evisceration, 10 retention sutures in situ, gaping wound covered in fibrinous exudate   - Daily wound check and dressing by RN  - c/w protonix daily, home med  - c/w TF 44 ml/hr, 22 hrs, 2 hr break for levothyroxine administration, transition to bolus feeds to help with sugar and diarrhea  -dietitian consult for proper calculation of calories and feeds amount   - KUB now to confirm tube tip stomach and not in pylorus     #renal  - BMP and I&O monitoring as routine   - adequate uop, remove iraheta, transition to diaper or female condom catheter  - Repletion of electrolytes PRN  - Lasix 40mg IV qd to bid     #heme  - stable H/H, s/p 1 prbc transfusion 1/8  - sub cut heparin 5000 U q8h for dvt ppx  - b/l LE venous duplex 1/14 no s/o DVT, calf veins not visualized well     #ID  spiking fevers since friday, blood cultures sent, started on zosyn and vanco  - blood cultures growing Enterococcus faecium, wound culture 1/12 growing gpc and gpr   - lines: iraheta, rt IJV (1/6) changed to lt IJV 1/13,, rt brachial A line, amaury, ET  - appreciate ID consult, ct abx  - TTE 1/12: good LV function, moderate pulm HTN, endocarditis can not be ruled out, needs MATEO if strongly suspected   -repeat blood culture to be sent     #endo  - hx RA on chronic prednisone, DM, hypothyroid  -pt had one hypoglycemia episode, received MPH 7 U at midnight, sugars not elevated   - Hydrocortisone 25 mg BID, do not titrate further down as this is nearly equivalent to home dose   - Synthroid 137mcg qhs through ngt   - NPH 10u q6h while 22 hr continuous tube feeds continued, low dose corrective scale q6h  - Endo following, appreciate recs  -plan to change to bolus feeds with appropriate change in insulin dosing     Lines:  -d/c A line, EtCO2 monitoring through ET and saturation monitoring with pulseox    #dispo  -SICU  -GOC discussion with family and confirm HCP 63y Female hx of COPD presented initially with SBO, underwent surgery closed with 4 retention sutures, evicerated on 12/30 and closed with 6 more retention sutures, now c/b multifocal pneumonia, flu and respiratory distress /hypoxia on continuous bipap. Patient temporarily transferred to RCU, but developed hypercapnia and respiratory distress requiring intubation 1/6 and subsequent transfer to SICU. S/p bronch 1/9/18    #neuro  Awake, alert, follows commands, RASS 0  - continue ativan PRN for discomfort/ anxiety  - Pain control w/ standing acetaminophen and hydromorphone 0.5 mg q4h, PRN oxycodone   - C/w gabapentin    #resp  Hx COPD and pulm HTN, current smoker, course c/b viral pneumonia, s/p bronch 1/9,  - Intubated, on AC CMV now, FIO2 50%, CPAP trial every day  - daily CXR AM for tube position checks   - C/w inhalers, pulmozyme, pulmonary toilet   - OOB to chair   - appreciate pulm recs     #CV  HD stable  - Continue amlodipine 10mg  - Metoprolol 25mg po BID   - Brachial arterial line removed, right hand warm and pink     #GI  s/p ex-lap for evisceration, 10 retention sutures in situ, gaping wound covered in fibrinous exudate   - Daily wound check and dressing by RN  - c/w protonix daily, home med  - c/w TF 44 ml/hr, 22 hrs, 2 hr break for levothyroxine administration, transition to bolus feeds to help with hyperglycemia and diarrhea  -nutrition consult for proper calculation of calories and feeds amount for bolus feeds  - tip of feeding tube confirmed in stomach    #renal  - BMP and I&O monitoring as routine   - adequate uop, remove iraheta, transition to diaper or female condom catheter  - Repletion of electrolytes PRN  - Lasix 40mg IV qd, another lasix 40 mg iv push for now      #heme  - stable H/H, s/p 1 prbc transfusion 1/8  - sub cut heparin 5000 U q8h for dvt ppx  - b/l LE venous duplex 1/14 no s/o DVT, calf veins not visualized well, ct IPCs and heparin    #ID  spiking fevers since friday, blood cultures sent, started on zosyn and vanco  - blood cultures growing Enterococcus faecium, wound culture 1/12 growing gpc and gpr,  - lines: iraheta, rt IJV (1/6) changed to lt IJV 1/13,, rt brachial A line, amaury ET  - TTE 1/12: good LV function, moderate pulm HTN, endocarditis can not be ruled out, needs MATEO if strongly suspected   -repeat blood culture to be sent, difficult stick  - appreciate ID consult, ct abx    #endo  - hx RA on chronic prednisone, DM, hypothyroid  - Hydrocortisone 25 mg BID, do not titrate further down as this is nearly equivalent to home dose   - Synthroid 137mcg qhs through ngt   - NPH 10u q6h while 22 hr continuous tube feeds continued, low dose corrective scale q6h  - Endo following, appreciate recs  -plan to change to bolus feeds with appropriate change in insulin dosing     Lines:  -left IJ central line  -amaury feeding tube  -iraheta  -ET    #dispo  -SICU  -GOC discussion with family with family relations as , discuss about possible trach

## 2019-01-15 NOTE — PROGRESS NOTE ADULT - SUBJECTIVE AND OBJECTIVE BOX
SICU Daily Progress Note  =====================================================  Interval/Overnight Events:  Brachial arterial line removed. Precedex decreased with PRN ativan added for anxiety. PEEP decreased from 10 --> 8. Patient requested HCP change. Vanc trough supratherapeutic, vancomycin dose frequency decreased     63y Female with pmh COPD, DM, RA, hypothyroid, pulm HTN, CHRIS, HTN admitted on 12/15 for SBO, underwent exploratory laparotomy and adhesiolysis, no transition point was noted. pt transferred to SICU for intentional delayed extubation post op due to pulm comorbidities.   transferred to floors after extubation, eviscerated after having violent coughing episode, RTOR, returned to SICU for intentional delayed extubation. Pt transferred to RCU as developed viral multifocal pneumonia, returned for worsening respi distress, intubated in SICU and remains intubated till date. Currently course c/b E fecalis bacteremia.     Allergies: No Known Allergies    MEDICATIONS:   --------------------------------------------------------------------------------------  Neurologic Medications  dexmedetomidine Infusion 0.3 MICROgram(s)/kG/Hr IV Continuous <Continuous>  gabapentin   Solution 300 milliGRAM(s) Oral two times a day  HYDROmorphone  Injectable 0.5 milliGRAM(s) IV Push every 4 hours PRN Pain unrelieved by PO  LORazepam     Tablet 0.5 milliGRAM(s) Oral daily PRN Anxiety  oxyCODONE    Solution 10 milliGRAM(s) Enteral Tube every 4 hours PRN Severe Pain (7 - 10)  oxyCODONE    Solution 5 milliGRAM(s) Enteral Tube every 4 hours PRN Moderate Pain (4 - 6)    Respiratory Medications  ALBUTerol    90 MICROgram(s) HFA Inhaler 1 Puff(s) Inhalation every 6 hours  buDESOnide 160 MICROgram(s)/formoterol 4.5 MICROgram(s) Inhaler 2 Puff(s) Inhalation two times a day  sodium chloride 3%  Inhalation 4 milliLiter(s) Inhalation every 6 hours    Cardiovascular Medications  amLODIPine   Tablet 10 milliGRAM(s) Oral daily  furosemide   Injectable 40 milliGRAM(s) IV Push daily  metoprolol tartrate 25 milliGRAM(s) Enteral Tube two times a day    Gastrointestinal Medications  pantoprazole   Suspension 40 milliGRAM(s) Oral daily    Genitourinary Medications    Hematologic/Oncologic Medications  heparin  Injectable 5000 Unit(s) SubCutaneous every 8 hours  influenza   Vaccine 0.5 milliLiter(s) IntraMuscular once    Antimicrobial/Immunologic Medications  piperacillin/tazobactam IVPB. 3.375 Gram(s) IV Intermittent every 8 hours  vancomycin  IVPB 1000 milliGRAM(s) IV Intermittent every 24 hours    Endocrine/Metabolic Medications  hydrocortisone sodium succinate Injectable 25 milliGRAM(s) IV Push every 12 hours  insulin lispro (HumaLOG) corrective regimen sliding scale   SubCutaneous every 6 hours  insulin NPH human recombinant 14 Unit(s) SubCutaneous every 6 hours  levothyroxine 137 MICROGram(s) Oral daily  simvastatin 20 milliGRAM(s) Oral at bedtime    Topical/Other Medications  chlorhexidine 0.12% Liquid 15 milliLiter(s) Swish and Spit two times a day  chlorhexidine 4% Liquid 1 Application(s) Topical <User Schedule>    --------------------------------------------------------------------------------------    VITAL SIGNS, INS/OUTS (last 24 hours):  --------------------------------------------------------------------------------------  ((Insert SICU Vitals/Is+Os here))***  --------------------------------------------------------------------------------------    EXAM  NEUROLOGY  RASS: 0  Exam: Normal, NAD, alert, oriented, no focal deficits.    HEENT  Exam: Normocephalic, atraumatic, EOMI.     RESPIRATORY  Exam: Lungs clear to auscultation, decreased air movement at bilateral bases  Mechanical Ventilation: Mode: AC/ CMV (Assist Control/ Continuous Mandatory Ventilation), RR (machine): 22, TV (machine): 400, FiO2: 50, PEEP: 8, MAP: 14, PIP: 22    CARDIOVASCULAR  Exam: S1, S2.  Regular rate and rhythm.     GI/NUTRITION  Exam: Abdomen soft, appropriately tender, Non-distended.  Wound: Retention sutures in place, dressing C/D/I  Current Diet:  Bolus tube feeds w/ Glucerna     VASCULAR  Exam: Extremities warm, pink, well-perfused.    MUSCULOSKELETAL  Exam: All extremities moving spontaneously without limitations.    METABOLIC/FLUIDS/ELECTROLYTES    HEMATOLOGIC  [x] VTE Prophylaxis: heparin  Injectable 5000 Unit(s) SubCutaneous every 8 hours    Transfusions:	[] PRBC	[] Platelets		[] FFP	[] Cryoprecipitate    INFECTIOUS DISEASE  Antimicrobials/Immunologic Medications:  influenza   Vaccine 0.5 milliLiter(s) IntraMuscular once  piperacillin/tazobactam IVPB. 3.375 Gram(s) IV Intermittent every 8 hours  vancomycin  IVPB 1000 milliGRAM(s) IV Intermittent every 24 hours    Tubes/Lines/Drains   [x] Peripheral IV  [] Central Venous Line     	[] R	[] L	[] IJ	[] Fem	[] SC	Date Placed:   [] Arterial Line		[] R	[] L	[] Fem	[] Rad	[] Ax	Date Placed:   [] PICC		[] Midline		[] Mediport  [] Urinary Catheter		Date Placed:   [x] Necessity of urinary, arterial, and venous catheters discussed    LABS  --------------------------------------------------------------------------------------  ((Insert [facility] Labs here))***  --------------------------------------------------------------------------------------    OTHER LABORATORY:     IMAGING STUDIES:   CXR: HISTORY  63y Female with pmh COPD, DM, RA, hypothyroid, pulm HTN, CHRIS, HTN admitted on 12/15 for SBO, underwent exploratory laparotomy and adhesiolysis, no transition point was noted. pt transferred to SICU for intentional delayed extubation post op due to pulm comorbidities.   transferred to floors after extubation, eviscerated after having violent coughing episode, RTOR, returned to SICU for intentional delayed extubation. Pt transferred to RCU as developed viral multifocal pneumonia, returned for worsening respi distress, intubated in SICU and remains intubated till date. Currently course c/b E fecalis bacteremia.     24 HOUR EVENTS: pt had change of right IJ central line to left IJ. Right brachial A line still in situ. Pt remains febrile, reculture today has not been sent due to difficult stick. Trial to wean off ventilator not done yd due to active issues. Propofol gtt d/c ed due to lowering trends in BP.     SUBJECTIVE/ROS:  [ ] A ten-point review of systems was otherwise negative except as noted.  [ ] Due to altered mental status/intubation, subjective information were not able to be obtained from the patient. History was obtained, to the extent possible, from review of the chart and collateral sources of information.      NEURO  RASS:   0-1  GCS: 15/15    CAM ICU:  Exam: can be woken up, follows commands,     Meds: dexmedetomidine Infusion 0.3 MICROgram(s)/kG/Hr IV Continuous <Continuous>  gabapentin   Solution 300 milliGRAM(s) Oral two times a day  HYDROmorphone  Injectable 0.5 milliGRAM(s) IV Push every 4 hours PRN Pain unrelieved by PO  oxyCODONE    Solution 10 milliGRAM(s) Enteral Tube every 4 hours PRN Severe Pain (7 - 10)  oxyCODONE    Solution 5 milliGRAM(s) Enteral Tube every 4 hours PRN Moderate Pain (4 - 6)    [x] Adequacy of sedation and pain control has been assessed and adjusted    RESPIRATORY  RR: 22 (01-14-19 @ 04:00) (14 - 29)  SpO2: 97% (01-14-19 @ 07:24) (94% - 100%)    Wt(kg): --    Exam: intubated, decreased AE b/l     Mechanical Ventilation: Mode: AC/ CMV (Assist Control/ Continuous Mandatory Ventilation), RR (machine): 22, RR (patient): 22, TV (machine): 400, FiO2: 50, PEEP: 10, MAP: 16, PIP: 27    ABG - ( 14 Jan 2019 03:45 )  pH: 7.45  /  pCO2: 41    /  pO2: 93    / HCO3: 28    / Base Excess: 3.8   /  SaO2: 97.0    Lactate: 2.1      chest xray today pending     [x] Extubation Readiness Assessed: not ready    Meds: ALBUTerol    90 MICROgram(s) HFA Inhaler 1 Puff(s) Inhalation every 6 hours  buDESOnide 160 MICROgram(s)/formoterol 4.5 MICROgram(s) Inhaler 2 Puff(s) Inhalation two times a day  sodium chloride 3%  Inhalation 4 milliLiter(s) Inhalation every 6 hours      CARDIOVASCULAR  HR: 82 (01-14-19 @ 07:24) (68 - 105)  BP: 109/67 (01-13-19 @ 23:00) (109/67 - 115/66)  BP(mean): 77 (01-13-19 @ 23:00) (77 - 78)  ABP: 87/48 (01-14-19 @ 04:00) (81/61 - 128/74)  ABP(mean): 64 (01-14-19 @ 04:00) (62 - 95)  Wt(kg): --    Exam: regular rate and rhythm  Cardiac Rhythm on tele: sinus  Perfusion     [x]Adequate   [ ]Inadequate  Mentation   [x]Normal       [ ]Reduced  Extremities  [x]Warm         [ ]Cool    Volume Status [ ]Hypervolemic [x]Euvolemic [ ]Hypovolemic    Meds:   amLODIPine   Tablet 10 milliGRAM(s) Oral daily  furosemide   Injectable 40 milliGRAM(s) IV Push daily  metoprolol tartrate 25 milliGRAM(s) Enteral Tube two times a day    GI/NUTRITION  Exam: soft, nontender, nondistended, incision C/D/I    Diet: tube feeds with Glucerna 44 ml/hr for 22 hrs     Meds: pantoprazole   Suspension 40 milliGRAM(s) Oral daily      GENITOURINARY  I&O's Detail    01-13 @ 07:01  -  01-14 @ 07:00  --------------------------------------------------------  IN:    dexmedetomidine Infusion: 114.8 mL    Glucerna: 779 mL    IV PiggyBack: 200 mL    propofol Infusion: 23.5 mL  Total IN: 1117.3 mL    OUT:    Indwelling Catheter - Urethral: 2690 mL  Total OUT: 2690 mL    Total NET: -1572.7 mL    01-14    143  |  105  |  18  ----------------------------<  98  3.1<L>   |  24  |  1.12    Creatinine Trend: 1.12<--, 1.03<--, 1.06<--, 0.94<--, 0.81<--, 0.89<--    Ca    8.3<L>      14 Jan 2019 03:45  Phos  3.5     01-14  Mg     2.0     01-14    [ x] Iraheta catheter, indication: uop monitoring in critical care pt     Meds: lactated ringers Bolus 500 milliLiter(s) IV Bolus once for hypotension   potassium chloride  20 mEq/100 mL IVPB 20 milliEquivalent(s) IV Intermittent every 2 hours    HEMATOLOGIC    [x] VTE Prophylaxis  heparin  Injectable 5000 Unit(s) SubCutaneous every 8 hours                          7.1    8.68  )-----------( 362      ( 14 Jan 2019 03:45 )             23.4     Transfusion     [ ] PRBC   [ ] Platelets   [ ] FFP   [ ] Cryoprecipitate      INFECTIOUS DISEASES    T(C): 38.2 (14 Jan 2019 00:00), Max: 38.2 (14 Jan 2019 00:00)  T(F): 100.8 (14 Jan 2019 00:00), Max: 100.8 (14 Jan 2019 00:00)    WBC Count: 8.68 K/uL (01-14 @ 03:45)  WBC Count: 9.97 K/uL (01-13 @ 03:11)  WBC Count: 9.73 K/uL (01-12 @ 02:10)  WBC Count: 10.44 K/uL (01-11 @ 02:10)  WBC Count: 13.83 K/uL (01-10 @ 02:45)    RECENT CULTURES:  BLOOD PERIPHERAL  01-11 @ 21:31 --  E faecium in aerobic bottle after 14 hours  BLOOD CULTURE PCR  Enterococcus faecium    Meds: influenza   Vaccine 0.5 milliLiter(s) IntraMuscular once  piperacillin/tazobactam IVPB. 3.375 Gram(s) IV Intermittent every 8 hours  vancomycin  IVPB      vancomycin  IVPB 1000 milliGRAM(s) IV Intermittent every 12 hours  vanco trough pre 3rd dose      ENDOCRINE    CAPILLARY BLOOD GLUCOSE    POCT Blood Glucose.: 276 mg/dL (14 Jan 2019 11:46)  POCT Blood Glucose.: 103 mg/dL (14 Jan 2019 06:50)  POCT Blood Glucose.: 135 mg/dL (13 Jan 2019 23:40)  POCT Blood Glucose.: 51 mg/dL (13 Jan 2019 18:08)    Meds:   dextrose 50% Injectable 25 milliLiter(s) IV Push once for hypoglycemia to 51  hydrocortisone sodium succinate Injectable 25 milliGRAM(s) IV Push every 12 hours  insulin lispro (HumaLOG) corrective regimen sliding scale   SubCutaneous every 6 hours  insulin NPH human recombinant 14 Unit(s) SubCutaneous every 6 hours  levothyroxine 137 MICROGram(s) Oral daily  simvastatin 20 milliGRAM(s) Oral at bedtime    ACCESS DEVICES:  [ ] Peripheral IV  [x ] Central Venous Line	[ ] R	[ x] L	[ x] IJ	[ ] Fem	[ ] SC	Placed: 1/13  [x ] Arterial Line		[ ] R	[ ] L	[ ] Fem	[ ] Rad	[ ] Ax	Placed: 1/9  [ ] PICC:					[ ] Mediport  [x ] Urinary Catheter iraheta, Date Placed:   [x ]feeding tube   [x] ET placed:1/5  [x] Necessity of urinary, arterial, and venous catheters discussed    OTHER MEDICATIONS:  chlorhexidine 0.12% Liquid 15 milliLiter(s) Swish and Spit two times a day  chlorhexidine 4% Liquid 1 Application(s) Topical <User Schedule>      CODE STATUS: full      IMAGING:  < from: CT Abdomen and Pelvis w/ Oral Cont and w/ IV Cont (01.12.19 @ 11:24) >  IMPRESSION:   Ventral midline diastasis and open wound without evidence for discrete   abscess or drainable collection.    Multifocal pneumonia, with increased consolidation in the right middle   and left lower lobes since 1/2/2018.    < end of copied text > HISTORY  63y Female with pmh COPD, DM, RA, hypothyroid, pulm HTN, CHRIS, HTN admitted on 12/15 for SBO, underwent exploratory laparotomy and adhesiolysis, no transition point was noted. pt transferred to SICU for intentional delayed extubation post op due to pulm comorbidities.   transferred to floors after extubation, eviscerated after having violent coughing episode, RTOR, returned to SICU for intentional delayed extubation. Pt transferred to RCU as developed viral multifocal pneumonia, returned for worsening respi distress, intubated in SICU and remains intubated till date. Currently course c/b E faecium bacteremia.     24 HOUR EVENTS:  Pt remains febrile, reculture today has not been sent due to difficult stick.     SUBJECTIVE/ROS:  [x ] A ten-point review of systems was otherwise negative except as noted.  [ ] Due to altered mental status/intubation, subjective information were not able to be obtained from the patient. History was obtained, to the extent possible, from review of the chart and collateral sources of information.    MEDICATIONS  (STANDING):  ALBUTerol    90 MICROgram(s) HFA Inhaler 1 Puff(s) Inhalation every 6 hours  amLODIPine   Tablet 10 milliGRAM(s) Oral daily  buDESOnide 160 MICROgram(s)/formoterol 4.5 MICROgram(s) Inhaler 2 Puff(s) Inhalation two times a day  chlorhexidine 0.12% Liquid 15 milliLiter(s) Swish and Spit two times a day  chlorhexidine 4% Liquid 1 Application(s) Topical <User Schedule>  dexmedetomidine Infusion 0.3 MICROgram(s)/kG/Hr (8.415 mL/Hr) IV Continuous <Continuous>  furosemide   Injectable 40 milliGRAM(s) IV Push daily  gabapentin   Solution 300 milliGRAM(s) Oral two times a day  heparin  Injectable 5000 Unit(s) SubCutaneous every 8 hours  hydrocortisone sodium succinate Injectable 25 milliGRAM(s) IV Push every 12 hours  influenza   Vaccine 0.5 milliLiter(s) IntraMuscular once  insulin lispro (HumaLOG) corrective regimen sliding scale   SubCutaneous every 6 hours  insulin NPH human recombinant 10 Unit(s) SubCutaneous every 6 hours  levothyroxine 137 MICROGram(s) Oral daily  metoprolol tartrate 25 milliGRAM(s) Enteral Tube two times a day  pantoprazole   Suspension 40 milliGRAM(s) Oral daily  piperacillin/tazobactam IVPB. 3.375 Gram(s) IV Intermittent every 8 hours  simvastatin 20 milliGRAM(s) Oral at bedtime  sodium chloride 3%  Inhalation 4 milliLiter(s) Inhalation every 6 hours  vancomycin  IVPB 1000 milliGRAM(s) IV Intermittent every 24 hours    MEDICATIONS  (PRN):  HYDROmorphone  Injectable 0.5 milliGRAM(s) IV Push every 4 hours PRN Pain unrelieved by PO    oxyCODONE    Solution 10 milliGRAM(s) Enteral Tube every 4 hours PRN Severe Pain (7 - 10)  oxyCODONE    Solution 5 milliGRAM(s) Enteral Tube every 4 hours PRN Moderate Pain (4 - 6)      NEURO  RASS:   0-1  GCS: 15/15    CAM ICU:  Exam: awake, appears comfortable, follows commands,     Meds:   gabapentin   Solution 300 milliGRAM(s) Oral two times a day  HYDROmorphone  Injectable 0.5 milliGRAM(s) IV Push every 4 hours PRN Pain unrelieved by PO  oxyCODONE    Solution 10 milliGRAM(s) Enteral Tube every 4 hours PRN Severe Pain (7 - 10)  oxyCODONE    Solution 5 milliGRAM(s) Enteral Tube every 4 hours PRN Moderate Pain (4 - 6)  LORazepam     Tablet 0.5 milliGRAM(s) Oral daily PRN Anxiety    [x] Adequacy of sedation and pain control has been assessed and adjusted    RESPIRATORY  RR: 22 (01-14-19 @ 04:00) (14 - 29)  SpO2: 97% (01-14-19 @ 07:24) (94% - 100%)  EtCO2 monitoring    Exam: intubated, decreased AE b/l     Mechanical Ventilation: Mode: AC/ CMV (Assist Control/ Continuous Mandatory Ventilation)  RR (machine): 22  TV (machine): 400  FiO2: 50  PEEP: 8  MAP: 16  PIP: 33    [x] Extubation Readiness Assessed: not ready    Meds: ALBUTerol    90 MICROgram(s) HFA Inhaler 1 Puff(s) Inhalation every 6 hours  buDESOnide 160 MICROgram(s)/formoterol 4.5 MICROgram(s) Inhaler 2 Puff(s) Inhalation two times a day  sodium chloride 3%  Inhalation 4 milliLiter(s) Inhalation every 6 hours      CARDIOVASCULAR    HR: 122 (15 Soy 2019 11:00) (56 - 122)  BP: 129/62 (15 Soy 2019 10:00) (110/67 - 133/87)  BP(mean): 75 (15 Soy 2019 10:00) (74 - 98)  ABP: 111/63 (15 Soy 2019 03:00) (91/60 - 125/75)  ABP(mean): 81 (15 Soy 2019 03:00) (73 - 98)    Exam: regular rate and rhythm  Cardiac Rhythm on tele: sinus  Perfusion     [x]Adequate   [ ]Inadequate  Mentation   [x]Normal       [ ]Reduced  Extremities  [x]Warm         [ ]Cool    Volume Status [x] hypervolemic []Euvolemic [ ]Hypovolemic    Meds:   amLODIPine   Tablet 10 milliGRAM(s) Oral daily  furosemide   Injectable 40 milliGRAM(s) IV Push daily  metoprolol tartrate 25 milliGRAM(s) Enteral Tube two times a day    GI/NUTRITION  Exam: soft, nontender, nondistended, incision gaping with fibrinous exudate, rectal pouch in situ for liquid stools    Diet: tube feeds with Glucerna 44 ml/hr for 22 hrs     Meds: pantoprazole   Suspension 40 milliGRAM(s) Oral daily      GENITOURINARY  I&O's Detail    14 Jan 2019 07:01  -  15 Soy 2019 07:00  --------------------------------------------------------  IN:    dexmedetomidine Infusion: 229.6 mL    Enteral Tube Flush: 400 mL    Glucerna: 765 mL    IV PiggyBack: 750 mL  Total IN: 2144.6 mL    OUT:    Indwelling Catheter - Urethral: 1585 mL    Rectal Tube: 250 mL  Total OUT: 1835 mL    Total NET: 309.6 mL      15 Soy 2019 07:01  -  15 Soy 2019 11:24  --------------------------------------------------------  IN:    Enteral Tube Flush: 70 mL    Glucerna: 440 mL    IV PiggyBack: 100 mL  Total IN: 610 mL    OUT:    Indwelling Catheter - Urethral: 570 mL  Total OUT: 570 mL    Total NET: 40 mL    01-15    143  |  105  |  17  ----------------------------<  192<H>  3.6   |  24  |  0.91    Ca    8.4      15 Soy 2019 02:29  Phos  2.5     01-15  Mg     2.2     01-15    [ x] Iraheta catheter, indication: uop monitoring in critical care pt , d/c and change to diaper/female condom cath    Meds:       HEMATOLOGIC    [x] VTE Prophylaxis  heparin  Injectable 5000 Unit(s) SubCutaneous every 8 hours                          7.6    8.30  )-----------( 358      ( 15 Soy 2019 02:29 )             24.9                    Transfusion     [ ] PRBC   [ ] Platelets   [ ] FFP   [ ] Cryoprecipitate      INFECTIOUS DISEASES  ICU Vital Signs Last 24 Hrs  T(C): 36.5 (15 Soy 2019 08:00), Max: 37.9 (14 Jan 2019 12:00)  T(F): 97.7 (15 Soy 2019 08:00), Max: 100.3 (14 Jan 2019 12:00)    WBC Count: 8.30 K/uL (01-15 @ 02:29)  WBC Count: 11.25 K/uL (01-14 @ 09:04)  WBC Count: 8.68 K/uL (01-14 @ 03:45)  WBC Count: 9.97 K/uL (01-13 @ 03:11)  WBC Count: 9.73 K/uL (01-12 @ 02:10)    RECENT CULTURES:  BLOOD PERIPHERAL  01-11 @ 21:31 --  E faecium in aerobic bottle after 14 hours  BLOOD CULTURE PCR  Enterococcus faecium    Meds: influenza   Vaccine 0.5 milliLiter(s) IntraMuscular once  piperacillin/tazobactam IVPB. 3.375 Gram(s) IV Intermittent every 8 hours  vancomycin  IVPB 1000 milliGRAM(s) IV Intermittent every 12 hours  Vancomycin Level, Trough -Pre 4th Dose, order if dosed q6/8/12h (01.14.19 @ 18:30)    Vancomycin Level, Trough: 20.3:     ENDOCRINE  CAPILLARY BLOOD GLUCOSE      POCT Blood Glucose.: 145 mg/dL (15 Soy 2019 05:12)  POCT Blood Glucose.: 228 mg/dL (15 Soy 2019 00:11)  POCT Blood Glucose.: 141 mg/dL (14 Jan 2019 18:39)  POCT Blood Glucose.: 276 mg/dL (14 Jan 2019 11:46)      Meds:   dextrose 50% Injectable 25 milliLiter(s) IV Push once for hypoglycemia to 51  hydrocortisone sodium succinate Injectable 25 milliGRAM(s) IV Push every 12 hours  levothyroxine 137 MICROGram(s) Oral daily  simvastatin 20 milliGRAM(s) Oral at bedtime  insulin lispro (HumaLOG) corrective regimen sliding scale   SubCutaneous every 6 hours  insulin NPH human recombinant 10 Unit(s) SubCutaneous every 6 hours      ACCESS DEVICES:  [ ] Peripheral IV  [x ] Central Venous Line	[ ] R	[ x] L	[ x] IJ	[ ] Fem	[ ] SC	Placed: 1/13  [] Arterial Line		[ ] R	[ ] L	[ ] Fem	[ ] Rad	[ ] Ax	Placed:   [ ] PICC:					[ ] Mediport  [x ] Urinary Catheter iraheta, Date Placed:   [x ]feeding tube   [x] ET placed:1/5  [x] rectal pouch  [x] Necessity of urinary, arterial, and venous catheters discussed    OTHER MEDICATIONS:  chlorhexidine 0.12% Liquid 15 milliLiter(s) Swish and Spit two times a day  chlorhexidine 4% Liquid 1 Application(s) Topical <User Schedule>      CODE STATUS: full      IMAGING:  < from: CT Abdomen and Pelvis w/ Oral Cont and w/ IV Cont (01.12.19 @ 11:24) >  IMPRESSION:   Ventral midline diastasis and open wound without evidence for discrete   abscess or drainable collection.    Multifocal pneumonia, with increased consolidation in the right middle   and left lower lobes since 1/2/2018.    < end of copied text >      < from: US Duplex Venous Lower Ext Complete, Bilateral (01.14.19 @ 16:23) >  IMPRESSION:     No evidence of bilateral lower extremity deep venous thrombosis.    Calf veins are not visualized. Consider repeat lower extremity venous   Doppler in 7-10 days.    < end of copied text >

## 2019-01-16 LAB
-  AMPICILLIN: SIGNIFICANT CHANGE UP
-  CIPROFLOXACIN: SIGNIFICANT CHANGE UP
-  TETRACYCLINE: SIGNIFICANT CHANGE UP
-  VANCOMYCIN: SIGNIFICANT CHANGE UP
ANION GAP SERPL CALC-SCNC: 15 MEQ/L — HIGH (ref 7–14)
BACTERIA BLD CULT: SIGNIFICANT CHANGE UP
BACTERIA WND CULT: SIGNIFICANT CHANGE UP
BUN SERPL-MCNC: 15 MG/DL — SIGNIFICANT CHANGE UP (ref 7–23)
CALCIUM SERPL-MCNC: 8.6 MG/DL — SIGNIFICANT CHANGE UP (ref 8.4–10.5)
CHLORIDE SERPL-SCNC: 107 MMOL/L — SIGNIFICANT CHANGE UP (ref 98–107)
CO2 SERPL-SCNC: 26 MMOL/L — SIGNIFICANT CHANGE UP (ref 22–31)
CREAT SERPL-MCNC: 1.01 MG/DL — SIGNIFICANT CHANGE UP (ref 0.5–1.3)
GLUCOSE SERPL-MCNC: 82 MG/DL — SIGNIFICANT CHANGE UP (ref 70–99)
HCT VFR BLD CALC: 27.9 % — LOW (ref 34.5–45)
HGB BLD-MCNC: 8.4 G/DL — LOW (ref 11.5–15.5)
MAGNESIUM SERPL-MCNC: 2.1 MG/DL — SIGNIFICANT CHANGE UP (ref 1.6–2.6)
MCHC RBC-ENTMCNC: 30.1 % — LOW (ref 32–36)
MCHC RBC-ENTMCNC: 30.2 PG — SIGNIFICANT CHANGE UP (ref 27–34)
MCV RBC AUTO: 100.4 FL — HIGH (ref 80–100)
METHOD TYPE: SIGNIFICANT CHANGE UP
NRBC # FLD: 0.1 K/UL — LOW (ref 25–125)
ORGANISM # SPEC MICROSCOPIC CNT: SIGNIFICANT CHANGE UP
PHOSPHATE SERPL-MCNC: 1.9 MG/DL — LOW (ref 2.5–4.5)
PLATELET # BLD AUTO: 425 K/UL — HIGH (ref 150–400)
PMV BLD: 9.6 FL — SIGNIFICANT CHANGE UP (ref 7–13)
POTASSIUM SERPL-MCNC: 3.4 MMOL/L — LOW (ref 3.5–5.3)
POTASSIUM SERPL-SCNC: 3.4 MMOL/L — LOW (ref 3.5–5.3)
RBC # BLD: 2.78 M/UL — LOW (ref 3.8–5.2)
RBC # FLD: 16.6 % — HIGH (ref 10.3–14.5)
SODIUM SERPL-SCNC: 148 MMOL/L — HIGH (ref 135–145)
VANCOMYCIN TROUGH SERPL-MCNC: 15.1 UG/ML — SIGNIFICANT CHANGE UP (ref 10–20)
WBC # BLD: 10.55 K/UL — HIGH (ref 3.8–10.5)
WBC # FLD AUTO: 10.55 K/UL — HIGH (ref 3.8–10.5)

## 2019-01-16 PROCEDURE — 99291 CRITICAL CARE FIRST HOUR: CPT

## 2019-01-16 PROCEDURE — 99232 SBSQ HOSP IP/OBS MODERATE 35: CPT

## 2019-01-16 PROCEDURE — 99292 CRITICAL CARE ADDL 30 MIN: CPT

## 2019-01-16 PROCEDURE — 71045 X-RAY EXAM CHEST 1 VIEW: CPT | Mod: 26

## 2019-01-16 RX ORDER — ACETAMINOPHEN 500 MG
1000 TABLET ORAL ONCE
Qty: 0 | Refills: 0 | Status: COMPLETED | OUTPATIENT
Start: 2019-01-16 | End: 2019-01-16

## 2019-01-16 RX ORDER — ONDANSETRON 8 MG/1
4 TABLET, FILM COATED ORAL EVERY 6 HOURS
Qty: 0 | Refills: 0 | Status: DISCONTINUED | OUTPATIENT
Start: 2019-01-16 | End: 2019-01-25

## 2019-01-16 RX ORDER — FUROSEMIDE 40 MG
20 TABLET ORAL ONCE
Qty: 0 | Refills: 0 | Status: COMPLETED | OUTPATIENT
Start: 2019-01-16 | End: 2019-01-16

## 2019-01-16 RX ORDER — HYDROMORPHONE HYDROCHLORIDE 2 MG/ML
0.5 INJECTION INTRAMUSCULAR; INTRAVENOUS; SUBCUTANEOUS
Qty: 0 | Refills: 0 | Status: DISCONTINUED | OUTPATIENT
Start: 2019-01-16 | End: 2019-01-21

## 2019-01-16 RX ORDER — NALOXONE HYDROCHLORIDE 4 MG/.1ML
0.1 SPRAY NASAL
Qty: 0 | Refills: 0 | Status: DISCONTINUED | OUTPATIENT
Start: 2019-01-16 | End: 2019-01-31

## 2019-01-16 RX ORDER — HYDROMORPHONE HYDROCHLORIDE 2 MG/ML
2 INJECTION INTRAMUSCULAR; INTRAVENOUS; SUBCUTANEOUS ONCE
Qty: 0 | Refills: 0 | Status: DISCONTINUED | OUTPATIENT
Start: 2019-01-16 | End: 2019-01-16

## 2019-01-16 RX ORDER — POTASSIUM PHOSPHATE, MONOBASIC POTASSIUM PHOSPHATE, DIBASIC 236; 224 MG/ML; MG/ML
15 INJECTION, SOLUTION INTRAVENOUS ONCE
Qty: 0 | Refills: 0 | Status: COMPLETED | OUTPATIENT
Start: 2019-01-16 | End: 2019-01-16

## 2019-01-16 RX ORDER — TIZANIDINE 4 MG/1
1 TABLET ORAL EVERY 6 HOURS
Qty: 0 | Refills: 0 | Status: COMPLETED | OUTPATIENT
Start: 2019-01-16 | End: 2019-01-18

## 2019-01-16 RX ORDER — GABAPENTIN 400 MG/1
300 CAPSULE ORAL THREE TIMES A DAY
Qty: 0 | Refills: 0 | Status: DISCONTINUED | OUTPATIENT
Start: 2019-01-16 | End: 2019-01-20

## 2019-01-16 RX ORDER — HYDROMORPHONE HYDROCHLORIDE 2 MG/ML
30 INJECTION INTRAMUSCULAR; INTRAVENOUS; SUBCUTANEOUS
Qty: 0 | Refills: 0 | Status: DISCONTINUED | OUTPATIENT
Start: 2019-01-16 | End: 2019-01-16

## 2019-01-16 RX ORDER — HYDROMORPHONE HYDROCHLORIDE 2 MG/ML
30 INJECTION INTRAMUSCULAR; INTRAVENOUS; SUBCUTANEOUS
Qty: 0 | Refills: 0 | Status: DISCONTINUED | OUTPATIENT
Start: 2019-01-16 | End: 2019-01-23

## 2019-01-16 RX ADMIN — CHLORHEXIDINE GLUCONATE 15 MILLILITER(S): 213 SOLUTION TOPICAL at 06:42

## 2019-01-16 RX ADMIN — Medication 1000 MILLIGRAM(S): at 03:16

## 2019-01-16 RX ADMIN — HYDROMORPHONE HYDROCHLORIDE 1 MILLIGRAM(S): 2 INJECTION INTRAMUSCULAR; INTRAVENOUS; SUBCUTANEOUS at 01:55

## 2019-01-16 RX ADMIN — HYDROMORPHONE HYDROCHLORIDE 1 MILLIGRAM(S): 2 INJECTION INTRAMUSCULAR; INTRAVENOUS; SUBCUTANEOUS at 06:49

## 2019-01-16 RX ADMIN — CHLORHEXIDINE GLUCONATE 1 APPLICATION(S): 213 SOLUTION TOPICAL at 09:31

## 2019-01-16 RX ADMIN — GABAPENTIN 300 MILLIGRAM(S): 400 CAPSULE ORAL at 06:42

## 2019-01-16 RX ADMIN — Medication 137 MICROGRAM(S): at 06:42

## 2019-01-16 RX ADMIN — Medication 10 UNIT(S): at 00:15

## 2019-01-16 RX ADMIN — ALBUTEROL 1 PUFF(S): 90 AEROSOL, METERED ORAL at 21:20

## 2019-01-16 RX ADMIN — OXYCODONE HYDROCHLORIDE 10 MILLIGRAM(S): 5 TABLET ORAL at 16:09

## 2019-01-16 RX ADMIN — HYDROMORPHONE HYDROCHLORIDE 30 MILLILITER(S): 2 INJECTION INTRAMUSCULAR; INTRAVENOUS; SUBCUTANEOUS at 16:18

## 2019-01-16 RX ADMIN — SODIUM CHLORIDE 3 MILLILITER(S): 9 INJECTION INTRAMUSCULAR; INTRAVENOUS; SUBCUTANEOUS at 09:02

## 2019-01-16 RX ADMIN — AMLODIPINE BESYLATE 10 MILLIGRAM(S): 2.5 TABLET ORAL at 06:42

## 2019-01-16 RX ADMIN — HYDROMORPHONE HYDROCHLORIDE 0.5 MILLIGRAM(S): 2 INJECTION INTRAMUSCULAR; INTRAVENOUS; SUBCUTANEOUS at 16:26

## 2019-01-16 RX ADMIN — HYDROMORPHONE HYDROCHLORIDE 1 MILLIGRAM(S): 2 INJECTION INTRAMUSCULAR; INTRAVENOUS; SUBCUTANEOUS at 07:24

## 2019-01-16 RX ADMIN — Medication 10 UNIT(S): at 23:33

## 2019-01-16 RX ADMIN — PIPERACILLIN AND TAZOBACTAM 25 GRAM(S): 4; .5 INJECTION, POWDER, LYOPHILIZED, FOR SOLUTION INTRAVENOUS at 01:18

## 2019-01-16 RX ADMIN — HYDROMORPHONE HYDROCHLORIDE 2 MILLIGRAM(S): 2 INJECTION INTRAMUSCULAR; INTRAVENOUS; SUBCUTANEOUS at 12:15

## 2019-01-16 RX ADMIN — SODIUM CHLORIDE 3 MILLILITER(S): 9 INJECTION INTRAMUSCULAR; INTRAVENOUS; SUBCUTANEOUS at 21:27

## 2019-01-16 RX ADMIN — Medication 25 MILLIGRAM(S): at 17:39

## 2019-01-16 RX ADMIN — Medication 3: at 23:33

## 2019-01-16 RX ADMIN — BUDESONIDE AND FORMOTEROL FUMARATE DIHYDRATE 2 PUFF(S): 160; 4.5 AEROSOL RESPIRATORY (INHALATION) at 09:01

## 2019-01-16 RX ADMIN — OXYCODONE HYDROCHLORIDE 10 MILLIGRAM(S): 5 TABLET ORAL at 04:46

## 2019-01-16 RX ADMIN — HYDROMORPHONE HYDROCHLORIDE 1 MILLIGRAM(S): 2 INJECTION INTRAMUSCULAR; INTRAVENOUS; SUBCUTANEOUS at 10:00

## 2019-01-16 RX ADMIN — Medication 400 MILLIGRAM(S): at 02:46

## 2019-01-16 RX ADMIN — Medication 400 MILLIGRAM(S): at 10:58

## 2019-01-16 RX ADMIN — HYDROMORPHONE HYDROCHLORIDE 2 MILLIGRAM(S): 2 INJECTION INTRAMUSCULAR; INTRAVENOUS; SUBCUTANEOUS at 12:20

## 2019-01-16 RX ADMIN — GABAPENTIN 300 MILLIGRAM(S): 400 CAPSULE ORAL at 23:24

## 2019-01-16 RX ADMIN — Medication 20 MILLIGRAM(S): at 16:59

## 2019-01-16 RX ADMIN — INSULIN GLARGINE 22 UNIT(S): 100 INJECTION, SOLUTION SUBCUTANEOUS at 23:24

## 2019-01-16 RX ADMIN — HYDROMORPHONE HYDROCHLORIDE 30 MILLILITER(S): 2 INJECTION INTRAMUSCULAR; INTRAVENOUS; SUBCUTANEOUS at 18:56

## 2019-01-16 RX ADMIN — Medication 25 MILLIGRAM(S): at 06:42

## 2019-01-16 RX ADMIN — Medication 25 MILLIGRAM(S): at 17:38

## 2019-01-16 RX ADMIN — PIPERACILLIN AND TAZOBACTAM 25 GRAM(S): 4; .5 INJECTION, POWDER, LYOPHILIZED, FOR SOLUTION INTRAVENOUS at 17:39

## 2019-01-16 RX ADMIN — SIMVASTATIN 20 MILLIGRAM(S): 20 TABLET, FILM COATED ORAL at 23:25

## 2019-01-16 RX ADMIN — HYDROMORPHONE HYDROCHLORIDE 1 MILLIGRAM(S): 2 INJECTION INTRAMUSCULAR; INTRAVENOUS; SUBCUTANEOUS at 01:40

## 2019-01-16 RX ADMIN — POTASSIUM PHOSPHATE, MONOBASIC POTASSIUM PHOSPHATE, DIBASIC 62.5 MILLIMOLE(S): 236; 224 INJECTION, SOLUTION INTRAVENOUS at 06:43

## 2019-01-16 RX ADMIN — Medication 1000 MILLIGRAM(S): at 11:45

## 2019-01-16 RX ADMIN — TIZANIDINE 1 MILLIGRAM(S): 4 TABLET ORAL at 23:25

## 2019-01-16 RX ADMIN — CHLORHEXIDINE GLUCONATE 15 MILLILITER(S): 213 SOLUTION TOPICAL at 17:36

## 2019-01-16 RX ADMIN — PANTOPRAZOLE SODIUM 40 MILLIGRAM(S): 20 TABLET, DELAYED RELEASE ORAL at 12:20

## 2019-01-16 RX ADMIN — Medication 10 UNIT(S): at 14:12

## 2019-01-16 RX ADMIN — BUDESONIDE AND FORMOTEROL FUMARATE DIHYDRATE 2 PUFF(S): 160; 4.5 AEROSOL RESPIRATORY (INHALATION) at 21:20

## 2019-01-16 RX ADMIN — ALBUTEROL 1 PUFF(S): 90 AEROSOL, METERED ORAL at 03:38

## 2019-01-16 RX ADMIN — Medication 0.5 MILLIGRAM(S): at 11:21

## 2019-01-16 RX ADMIN — OXYCODONE HYDROCHLORIDE 10 MILLIGRAM(S): 5 TABLET ORAL at 04:16

## 2019-01-16 RX ADMIN — OXYCODONE HYDROCHLORIDE 5 MILLIGRAM(S): 5 TABLET ORAL at 07:38

## 2019-01-16 RX ADMIN — Medication 0.5 MILLIGRAM(S): at 02:04

## 2019-01-16 RX ADMIN — Medication 250 MILLIGRAM(S): at 06:43

## 2019-01-16 RX ADMIN — HYDROMORPHONE HYDROCHLORIDE 1 MILLIGRAM(S): 2 INJECTION INTRAMUSCULAR; INTRAVENOUS; SUBCUTANEOUS at 10:58

## 2019-01-16 RX ADMIN — ALBUTEROL 1 PUFF(S): 90 AEROSOL, METERED ORAL at 09:01

## 2019-01-16 RX ADMIN — OXYCODONE HYDROCHLORIDE 5 MILLIGRAM(S): 5 TABLET ORAL at 08:06

## 2019-01-16 RX ADMIN — Medication 40 MILLIGRAM(S): at 06:42

## 2019-01-16 RX ADMIN — PIPERACILLIN AND TAZOBACTAM 25 GRAM(S): 4; .5 INJECTION, POWDER, LYOPHILIZED, FOR SOLUTION INTRAVENOUS at 09:31

## 2019-01-16 RX ADMIN — OXYCODONE HYDROCHLORIDE 10 MILLIGRAM(S): 5 TABLET ORAL at 14:34

## 2019-01-16 RX ADMIN — GABAPENTIN 300 MILLIGRAM(S): 400 CAPSULE ORAL at 14:11

## 2019-01-16 RX ADMIN — TIZANIDINE 1 MILLIGRAM(S): 4 TABLET ORAL at 17:39

## 2019-01-16 RX ADMIN — ALBUTEROL 1 PUFF(S): 90 AEROSOL, METERED ORAL at 15:26

## 2019-01-16 NOTE — PROGRESS NOTE ADULT - ASSESSMENT
63y Female hx of COPD presented initially with SBO, underwent surgery closed with 4 retention sutures, evicerated on 12/30 and closed with 6 more retention sutures, now c/b multifocal pneumonia, flu and respiratory distress /hypoxia on continuous bipap. Patient temporarily transferred to RCU, but developed hypercapnia and respiratory distress requiring intubation 1/6 and subsequent transfer to SICU. S/p bronch 1/9/18    #neuro  Awake, alert, follows commands, RASS 0  - continue ativan PRN for discomfort/ anxiety  - Pain control w/ standing acetaminophen and hydromorphone 0.5 mg q4h, PRN oxycodone   - C/w gabapentin    #resp  Hx COPD and pulm HTN, current smoker, course c/b viral pneumonia, s/p bronch 1/9,  - Intubated, on AC CMV now, FIO2 50%, CPAP trial every day  - C/w inhalers, pulmozyme, pulmonary toilet   - OOB to chair   - appreciate pulm recs     #CV  HD stable  - Continue amlodipine 10mg  - Metoprolol 25mg po BID     #GI  s/p ex-lap for evisceration, 10 retention sutures in situ, gaping wound covered in fibrinous exudate   - Daily wound check and dressing by RN  - c/w protonix daily, home med  - Patient transitioned to bolus tube feeds, tolerating well    #renal  - BMP and I&O monitoring as routine   - iraheta removed, patient spontaneously voiding   - Repletion of electrolytes PRN  - Lasix 40mg IV qd    #heme  - H/H stable   - VTE ppx w/ Lovenox   - b/l LE venous duplex 1/14 no s/o DVT, calf veins not visualized well, IPCs and heparin    #ID  spiking fevers since friday, blood cultures sent, started on zosyn and vanco  - blood cultures growing Enterococcus faecium, wound culture 1/12 growing gpc and gpr,  - lines: iraheta, rt IJV (1/6) changed to lt IJV 1/13,  amaury, ET  - TTE 1/12: good LV function, moderate pulm HTN, endocarditis can not be ruled out, needs MATEO if strongly suspected   - appreciate ID consult, ct abx    #endo  - hx RA on chronic prednisone, DM, hypothyroid  - Hydrocortisone 25 mg BID, do not titrate further down as this is nearly equivalent to home dose   - Synthroid 137mcg qhs through ngt   - Endo following, appreciate recs  - Lantus 22 units q hs  - Humalog 10units TID AC (prior to bolus feeds)    Lines:  -left IJ central line  -amaury feeding tube  -ET    #dispo  -SICU

## 2019-01-16 NOTE — ADVANCED PRACTICE NURSE CONSULT - REASON FOR CONSULT
Patient seen on skin care rounds after wound care referral received for assessment of skin impairment and recommendations of topical management of midline abdominal wound. Chart reviewed: Serum 2.8g/dL, WBC 10.55, Hgb/Hct 8.4/27.9, Xu 13, BMI 46.7kg/m2. Patient H/O COPD (3L O2), DM, RA, pulm HTN,  section x2 presented 12/15 with SBO concerning for compromised bowel; ex-lap SHANNON , UTI, now s/p ex-lap  after eviscerated w findings of intact bowel, closed w retention sutures, intubated post op, extubated , c/b multiple febrile episodes, transferred to RCU on bipap on RCU during hospital stay and then consulted SICU for worsening respiratory status despite continuous bipap intubated  for ARDs.

## 2019-01-16 NOTE — ADVANCED PRACTICE NURSE CONSULT - RECOMMEDATIONS
Recommend pre-albumin level and reconsult dietary as needed to optimize wound healing.    Midline abdominal wound: Cleanse with NS, pat dry. Apply Liquid barrier film to periwound skin. Apply Collagenase (nickel thickness) application, cover with Restore (gel-impregnated gauze), cover with dry gauze, and abdominal pad and secure with tegaderm. Change daily.    Intertriginous folds: Place Interdry textile sheeting, remove to wash & dry affected area, then replace. Individual sheeting may be used for up to 5 days unless soiled.     Continue low air loss bed therapy, continue heel elevation, continue to turn & reposition q2h,  continue moisture management with barrier creams & single breathable pad, continue measures to decrease friction/shear/pressure.     Please call wound care service line is further assistance is needed ([phone]).

## 2019-01-16 NOTE — PROGRESS NOTE ADULT - SUBJECTIVE AND OBJECTIVE BOX
Interval Events: Made small changes to TF regimen - still on bolus feeds on glucerna, increased procal packets. Seen by wound care RN with recs made. Seen by Dr Lisker (pt's OP pulm) who recommended rpt BCx.  received extra dose of lasix today with adequate response in UOP. In process of setting up family meeting for  tomorrow. Started on PCA. Pt's family requesting transfer to [facility]. [facility] contacted - Chema had discussed with an attg at [facility]. Per case management, pt awaiting preauthorization through insurance at this time.     63y Female with pmh COPD, DM, RA, hypothyroid, pulm HTN, CHRIS, HTN admitted on 12/15 for SBO, underwent exploratory laparotomy and adhesiolysis, no transition point was noted. pt transferred to SICU for intentional delayed extubation post op due to pulm comorbidities.   transferred to floors after extubation, eviscerated after having violent coughing episode, RTOR, returned to SICU for intentional delayed extubation. Pt transferred to RCU as developed viral multifocal pneumonia, returned for worsening respiratory distress, intubated in SICU and remains intubated till date. Currently course c/b E faecium bacteremia.     Allergies: No Known Allergies    MEDICATIONS:   --------------------------------------------------------------------------------------  Neurologic Medications  gabapentin   Solution 300 milliGRAM(s) Oral two times a day  HYDROmorphone  Injectable 1 milliGRAM(s) IV Push every 4 hours PRN Severe Pain (7 - 10)  LORazepam     Tablet 0.5 milliGRAM(s) Oral two times a day PRN Anxiety  oxyCODONE    Solution 10 milliGRAM(s) Enteral Tube every 4 hours PRN Severe Pain (7 - 10)  oxyCODONE    Solution 5 milliGRAM(s) Enteral Tube every 4 hours PRN Moderate Pain (4 - 6)    Respiratory Medications  ALBUTerol    90 MICROgram(s) HFA Inhaler 1 Puff(s) Inhalation every 6 hours  buDESOnide 160 MICROgram(s)/formoterol 4.5 MICROgram(s) Inhaler 2 Puff(s) Inhalation two times a day  sodium chloride 3%  Inhalation 3 milliLiter(s) Inhalation two times a day    Cardiovascular Medications  amLODIPine   Tablet 10 milliGRAM(s) Oral daily  furosemide   Injectable 40 milliGRAM(s) IV Push daily  metoprolol tartrate 25 milliGRAM(s) Enteral Tube two times a day    Gastrointestinal Medications  pantoprazole   Suspension 40 milliGRAM(s) Oral daily    Genitourinary Medications    Hematologic/Oncologic Medications  enoxaparin Injectable 40 milliGRAM(s) SubCutaneous daily  influenza   Vaccine 0.5 milliLiter(s) IntraMuscular once    Antimicrobial/Immunologic Medications  piperacillin/tazobactam IVPB. 3.375 Gram(s) IV Intermittent every 8 hours  vancomycin  IVPB 1000 milliGRAM(s) IV Intermittent every 24 hours    Endocrine/Metabolic Medications  hydrocortisone sodium succinate Injectable 25 milliGRAM(s) IV Push every 12 hours  insulin glargine Injectable (LANTUS) 22 Unit(s) SubCutaneous at bedtime  insulin lispro (HumaLOG) corrective regimen sliding scale   SubCutaneous every 8 hours  insulin lispro Injectable (HumaLOG) 10 Unit(s) SubCutaneous every 8 hours  levothyroxine 137 MICROGram(s) Oral daily  simvastatin 20 milliGRAM(s) Oral at bedtime    Topical/Other Medications  chlorhexidine 0.12% Liquid 15 milliLiter(s) Swish and Spit two times a day  chlorhexidine 4% Liquid 1 Application(s) Topical <User Schedule>    --------------------------------------------------------------------------------------    VITAL SIGNS, INS/OUTS (last 24 hours):  --------------------------------------------------------------------------------------  ((Insert SICU Vitals/Is+Os here))***  --------------------------------------------------------------------------------------    EXAM  NEUROLOGY  RASS: 0 to +1/+2  Exam: Normal, NAD, alert, oriented, no focal deficits    HEENT  Exam: Normocephalic, atraumatic, EOMI.    RESPIRATORY  Exam: Lungs clear to auscultation, decreased air movement to bilateral bases   Mechanical Ventilation: Mode: CPAP with PS, FiO2: 50, PEEP: 8, MAP: 10, PIP: 22    CARDIOVASCULAR  Exam: S1, S2.  Regular rate and rhythm.     GI/NUTRITION  Exam: Soft, nontender, nondistended, incision gaping with fibrinous exudate, rectal pouch in situ for liquid stools  Current Diet:  TF, glucerna 400ml bolus feeds TID    VASCULAR  Exam: Extremities warm, pink, well-perfused.    MUSCULOSKELETAL  Exam: All extremities moving spontaneously without limitations.     METABOLIC/FLUIDS/ELECTROLYTES      HEMATOLOGIC  [x] VTE Prophylaxis: enoxaparin Injectable 40 milliGRAM(s) SubCutaneous daily    Transfusions:	[] PRBC	[] Platelets		[] FFP	[] Cryoprecipitate    INFECTIOUS DISEASE  Antimicrobials/Immunologic Medications:  influenza   Vaccine 0.5 milliLiter(s) IntraMuscular once  piperacillin/tazobactam IVPB. 3.375 Gram(s) IV Intermittent every 8 hours  vancomycin  IVPB 1000 milliGRAM(s) IV Intermittent every 24 hours    Tubes/Lines/Drains  [x] Peripheral IV  [] Central Venous Line     	[] R	[] L	[] IJ	[] Fem	[] SC	Date Placed:   [] Arterial Line		[] R	[] L	[] Fem	[] Rad	[] Ax	Date Placed:   [] PICC		[] Midline		[] Mediport  [] Urinary Catheter		Date Placed:   [x] Necessity of urinary, arterial, and venous catheters discussed    LABS  --------------------------------------------------------------------------------------  ((Insert SICU Labs here))***  --------------------------------------------------------------------------------------    OTHER LABORATORY:     IMAGING STUDIES:   CXR:       Assessment and Plan:   · Assessment	  63y Female hx of COPD presented initially with SBO, underwent surgery closed with 4 retention sutures, evicerated on 12/30 and closed with 6 more retention sutures, now c/b multifocal pneumonia, flu and respiratory distress /hypoxia on continuous bipap. Patient temporarily transferred to RCU, but developed hypercapnia and respiratory distress requiring intubation 1/6 and subsequent transfer to SICU. S/p bronch 1/9/18    #neuro  Awake, alert, follows commands, RASS 0  - continue ativan PRN for discomfort/ anxiety  - Pain control w/ standing acetaminophen and hydromorphone 0.5 mg q4h, PRN oxycodone   - C/w gabapentin  - C/w dilaudid PCA, appreciate pain management recs    #resp  Hx COPD and pulm HTN, current smoker, course c/b viral pneumonia, s/p bronch 1/9,  - Intubated, on AC CMV now, FIO2 50%, CPAP trial every day  - C/w inhalers, pulmozyme, pulmonary toilet   - OOB to chair   - appreciate pulm recs     #CV  HD stable  - Continue amlodipine 10mg  - Metoprolol 25mg po BID     #GI  s/p ex-lap for evisceration, 10 retention sutures in situ, gaping wound covered in fibrinous exudate   - Daily wound check and dressing by RN  - c/w protonix daily, home med  - Patient transitioned to bolus tube feeds, tolerating well    #renal  - BMP and I&O monitoring as routine   - iraheta removed, patient spontaneously voiding   - Repletion of electrolytes PRN  - Lasix 40mg IV qd    #heme  - H/H stable   - VTE ppx w/ Lovenox   - b/l LE venous duplex 1/14 no s/o DVT, calf veins not visualized well, IPCs and heparin    #ID  spiking fevers since friday, blood cultures sent, started on zosyn and vanco  - blood cultures growing Enterococcus faecium, wound culture 1/12 growing gpc and gpr,  - lines: iraheta, rt IJV (1/6) changed to lt IJV 1/13,  maaury, ET  - TTE 1/12: good LV function, moderate pulm HTN, endocarditis can not be ruled out, needs MATEO if strongly suspected   - appreciate ID consult, ct abx    #endo  - hx RA on chronic prednisone, DM, hypothyroid  - Hydrocortisone 25 mg BID, do not titrate further down as this is nearly equivalent to home dose   - Synthroid 137mcg qhs through ngt   - Endo following, appreciate recs  - Lantus 22 units q hs  - Humalog 10units TID AC (prior to bolus feeds)    Lines:  -left IJ central line  -amaury feeding tube  -ET    #dispo  -SICU; awaiting transfer to [facility].

## 2019-01-16 NOTE — PROGRESS NOTE ADULT - ATTENDING COMMENTS
Above noted.   A meeting to discuss the patient's condition and family/patient's concerns was held yesterday.  All concerns and recommendations were addressed by Dr. Nicole and I, with the Nursing staff and  present.  The family and the patient requested a transfer to another hospital, and for a different surgeon to take over the patient's care.  The patient was transferred to Dr. Nicole's Service. I discussed all pertinent issues with Dr. Nicole.

## 2019-01-16 NOTE — PROGRESS NOTE ADULT - SUBJECTIVE AND OBJECTIVE BOX
HPI:  63 year old woman with COPD on home O2 (3L), moderate pulmonary HTN, DM, RA on prednisone (10 mg daily x 30 years), HTN, s/p CVA (no residual deficit), s/p bowel surgery and complications & h/o chronic tramadol now on ventilator in ICU and still in pain.    PAST MEDICAL & SURGICAL HISTORY:  Diverticulosis  CVA (Cerebral Vascular Accident)  RA (Rheumatoid Arthritis)  Tooth Abscess  Arthritis  Cigarette Smoker  Chronic Asthma  Adult Hypothyroidism  Borderline Diabetes Mellitus  High Cholesterol  H/O: HTN  Wrist Disorder: S/P Surgery  History of  Section      FAMILY HISTORY:  No pertinent family history in first degree relatives      SOCIAL HISTORY:  [ ] Denies Smoking, Alcohol, or Drug Use    Allergies    No Known Allergies    Intolerances        PAIN MEDICATIONS:  gabapentin   Solution 300 milliGRAM(s) Oral three times a day  HYDROmorphone PCA (1 mG/mL) 30 milliLiter(s) PCA Continuous PCA Continuous  HYDROmorphone PCA (1 mG/mL) Rescue Clinician Bolus 0.5 milliGRAM(s) IV Push every 15 minutes PRN  LORazepam     Tablet 0.5 milliGRAM(s) Oral two times a day PRN  ondansetron Injectable 4 milliGRAM(s) IV Push every 6 hours PRN  tiZANidine 1 milliGRAM(s) Oral every 6 hours    Heme:  enoxaparin Injectable 40 milliGRAM(s) SubCutaneous daily    Antibiotics:  piperacillin/tazobactam IVPB. 3.375 Gram(s) IV Intermittent every 8 hours  vancomycin  IVPB 1000 milliGRAM(s) IV Intermittent every 24 hours    Cardiovascular:  amLODIPine   Tablet 10 milliGRAM(s) Oral daily  furosemide   Injectable 40 milliGRAM(s) IV Push daily  metoprolol tartrate 25 milliGRAM(s) Enteral Tube two times a day  Vital Signs Last 24 Hrs  T(C): 37.4 (2019 16:00), Max: 37.8 (15 Soy 2019 20:00)  T(F): 99.4 (2019 16:00), Max: 100.1 (15 Soy 2019 20:00)  HR: 115 (2019 18:00) (85 - 123)  BP: 128/71 (2019 18:00) (96/70 - 154/68)  BP(mean): 85 (2019 18:00) (57 - 91)  RR: 20 (2019 18:00) (18 - 27)  SpO2: 89% (2019 18:00) (89% - 99%)    PAIN SCORE:        see chart           Physical Exam: A & O x 3 in some discomfort      Impression/Plan: Pain not adequately relieved with current opioid treatment regimen. Agree with plan to begin Hydromorphone IV PCA and reevaluate by Pain Service tomorrow.

## 2019-01-16 NOTE — PROGRESS NOTE ADULT - SUBJECTIVE AND OBJECTIVE BOX
B-Team Surgery Progress Note     Subjective/24hour Events: Currently intubated. Off precedex drip w/ ativan for anxiety. Antoine removed and passed TOV. Patient on basal/bolus insulin Family meeting held and family desires to speak with patient regarding trach. Patient now under Dr. Nicole service. C/o abd pain. NG tube feeds.    Vital Signs:  Vital Signs Last 24 Hrs  T(C): 37.3 (16 Jan 2019 08:00), Max: 37.8 (15 Soy 2019 12:30)  T(F): 99.1 (16 Jan 2019 08:00), Max: 100.1 (15 Soy 2019 16:00)  HR: 97 (16 Jan 2019 09:02) (85 - 123)  BP: 126/59 (16 Jan 2019 09:00) (97/53 - 148/72)  BP(mean): 75 (16 Jan 2019 09:00) (57 - 91)  RR: 21 (16 Jan 2019 09:00) (18 - 27)  SpO2: 92% (16 Jan 2019 09:00) (90% - 100%)    CAPILLARY BLOOD GLUCOSE      POCT Blood Glucose.: 80 mg/dL (16 Jan 2019 07:16)  POCT Blood Glucose.: 94 mg/dL (16 Jan 2019 00:08)  POCT Blood Glucose.: 127 mg/dL (15 Soy 2019 21:45)  POCT Blood Glucose.: 331 mg/dL (15 Soy 2019 21:41)  POCT Blood Glucose.: 109 mg/dL (15 Soy 2019 17:40)  POCT Blood Glucose.: 232 mg/dL (15 Soy 2019 12:13)      I&O's Detail    15 Soy 2019 07:01  -  16 Jan 2019 07:00  --------------------------------------------------------  IN:    Enteral Tube Flush: 190 mL    Glucerna: 1320 mL    IV PiggyBack: 900 mL  Total IN: 2410 mL    OUT:    Indwelling Catheter - Urethral: 2310 mL    Rectal Tube: 270 mL    Voided: 650 mL  Total OUT: 3230 mL    Total NET: -820 mL      16 Jan 2019 07:01  -  16 Jan 2019 09:29  --------------------------------------------------------  IN:    Glucerna: 440 mL  Total IN: 440 mL    OUT:    Voided: 200 mL  Total OUT: 200 mL    Total NET: 240 mL            MEDICATIONS  (STANDING):  ALBUTerol    90 MICROgram(s) HFA Inhaler 1 Puff(s) Inhalation every 6 hours  amLODIPine   Tablet 10 milliGRAM(s) Oral daily  buDESOnide 160 MICROgram(s)/formoterol 4.5 MICROgram(s) Inhaler 2 Puff(s) Inhalation two times a day  chlorhexidine 0.12% Liquid 15 milliLiter(s) Swish and Spit two times a day  chlorhexidine 4% Liquid 1 Application(s) Topical <User Schedule>  enoxaparin Injectable 40 milliGRAM(s) SubCutaneous daily  furosemide   Injectable 40 milliGRAM(s) IV Push daily  gabapentin   Solution 300 milliGRAM(s) Oral two times a day  hydrocortisone sodium succinate Injectable 25 milliGRAM(s) IV Push every 12 hours  influenza   Vaccine 0.5 milliLiter(s) IntraMuscular once  insulin glargine Injectable (LANTUS) 22 Unit(s) SubCutaneous at bedtime  insulin lispro (HumaLOG) corrective regimen sliding scale   SubCutaneous every 8 hours  insulin lispro Injectable (HumaLOG) 10 Unit(s) SubCutaneous every 8 hours  levothyroxine 137 MICROGram(s) Oral daily  metoprolol tartrate 25 milliGRAM(s) Enteral Tube two times a day  pantoprazole   Suspension 40 milliGRAM(s) Oral daily  piperacillin/tazobactam IVPB. 3.375 Gram(s) IV Intermittent every 8 hours  simvastatin 20 milliGRAM(s) Oral at bedtime  sodium chloride 3%  Inhalation 3 milliLiter(s) Inhalation two times a day  vancomycin  IVPB 1000 milliGRAM(s) IV Intermittent every 24 hours    MEDICATIONS  (PRN):  HYDROmorphone  Injectable 1 milliGRAM(s) IV Push every 4 hours PRN Severe Pain (7 - 10)  LORazepam     Tablet 0.5 milliGRAM(s) Oral two times a day PRN Anxiety  oxyCODONE    Solution 10 milliGRAM(s) Enteral Tube every 4 hours PRN Severe Pain (7 - 10)  oxyCODONE    Solution 5 milliGRAM(s) Enteral Tube every 4 hours PRN Moderate Pain (4 - 6)        Physical Exam:  Gen: NAD  LS: nml respiratory effort  Card: pulse regularly present  GI: abd soft, retention sutures in place, aquacel in place w/ dressing c/d/i  Ext: warm      Labs:    01-16    148<H>  |  107  |  15  ----------------------------<  82  3.4<L>   |  26  |  1.01    Ca    8.6      16 Jan 2019 02:10  Phos  1.9     01-16  Mg     2.1     01-16                              8.4    10.55 )-----------( 425      ( 16 Jan 2019 02:10 )             27.9               Imaging:  EXAM:  XR CHEST PORTABLE ROUTINE 1V      PROCEDURE DATE:  Jan 16 2019     INTERPRETATION:  TIME OF EXAM: January 16, 2019 at 12:37 AM    CLINICAL INFORMATION: COPD; intubated.    TECHNIQUE:   Portable chest    INTERPRETATION:     Improved appearance of the left lung with possible decreasing loculated   effusion although there is rotation into an LY projection.    The endotracheal and enteric tube in addition to left subclavian line are   unchanged. No pneumothorax. Right lung is unchanged with peripheral and   more central opacities.      COMPARISON:  January 14    IMPRESSION:  Follow-up with mild improvement of left lung opacity   although rotation on the radiograph may account for some of this change   and considerable opacification of the right lung and left lung base may   indicate multifocal pneumonia.

## 2019-01-16 NOTE — PROGRESS NOTE ADULT - SUBJECTIVE AND OBJECTIVE BOX
SICU Daily Progress Note  =====================================================  Interval/Overnight Events:  Transitioned completely off precedex drip with oral ativan PRN anxiety. Family meeting held, will actively discuss tracheostomy with patient and other family members. NPH stopped and patient transitioned to basal/bolus insulin. Patient on bolus tube feeds. Family requesting new surgeon and ACS assumed care. Antoine removed, patient successfully passed trial of void.     63y Female with pmh COPD, DM, RA, hypothyroid, pulm HTN, CHRIS, HTN admitted on 12/15 for SBO, underwent exploratory laparotomy and adhesiolysis, no transition point was noted. pt transferred to SICU for intentional delayed extubation post op due to pulm comorbidities.   transferred to floors after extubation, eviscerated after having violent coughing episode, RTOR, returned to SICU for intentional delayed extubation. Pt transferred to RCU as developed viral multifocal pneumonia, returned for worsening respiratory distress, intubated in SICU and remains intubated till date. Currently course c/b E faecium bacteremia.     Allergies: No Known Allergies    MEDICATIONS:   --------------------------------------------------------------------------------------  Neurologic Medications  gabapentin   Solution 300 milliGRAM(s) Oral two times a day  HYDROmorphone  Injectable 1 milliGRAM(s) IV Push every 4 hours PRN Severe Pain (7 - 10)  LORazepam     Tablet 0.5 milliGRAM(s) Oral two times a day PRN Anxiety  oxyCODONE    Solution 10 milliGRAM(s) Enteral Tube every 4 hours PRN Severe Pain (7 - 10)  oxyCODONE    Solution 5 milliGRAM(s) Enteral Tube every 4 hours PRN Moderate Pain (4 - 6)    Respiratory Medications  ALBUTerol    90 MICROgram(s) HFA Inhaler 1 Puff(s) Inhalation every 6 hours  buDESOnide 160 MICROgram(s)/formoterol 4.5 MICROgram(s) Inhaler 2 Puff(s) Inhalation two times a day  sodium chloride 3%  Inhalation 3 milliLiter(s) Inhalation two times a day    Cardiovascular Medications  amLODIPine   Tablet 10 milliGRAM(s) Oral daily  furosemide   Injectable 40 milliGRAM(s) IV Push daily  metoprolol tartrate 25 milliGRAM(s) Enteral Tube two times a day    Gastrointestinal Medications  pantoprazole   Suspension 40 milliGRAM(s) Oral daily    Genitourinary Medications    Hematologic/Oncologic Medications  enoxaparin Injectable 40 milliGRAM(s) SubCutaneous daily  influenza   Vaccine 0.5 milliLiter(s) IntraMuscular once    Antimicrobial/Immunologic Medications  piperacillin/tazobactam IVPB. 3.375 Gram(s) IV Intermittent every 8 hours  vancomycin  IVPB 1000 milliGRAM(s) IV Intermittent every 24 hours    Endocrine/Metabolic Medications  hydrocortisone sodium succinate Injectable 25 milliGRAM(s) IV Push every 12 hours  insulin glargine Injectable (LANTUS) 22 Unit(s) SubCutaneous at bedtime  insulin lispro (HumaLOG) corrective regimen sliding scale   SubCutaneous every 8 hours  insulin lispro Injectable (HumaLOG) 10 Unit(s) SubCutaneous every 8 hours  levothyroxine 137 MICROGram(s) Oral daily  simvastatin 20 milliGRAM(s) Oral at bedtime    Topical/Other Medications  chlorhexidine 0.12% Liquid 15 milliLiter(s) Swish and Spit two times a day  chlorhexidine 4% Liquid 1 Application(s) Topical <User Schedule>    --------------------------------------------------------------------------------------    VITAL SIGNS, INS/OUTS (last 24 hours):  --------------------------------------------------------------------------------------  ((Insert SICU Vitals/Is+Os here))***  --------------------------------------------------------------------------------------    EXAM  NEUROLOGY  RASS: 0 to +1/+2  Exam: Normal, NAD, alert, oriented, no focal deficits    HEENT  Exam: Normocephalic, atraumatic, EOMI.    RESPIRATORY  Exam: Lungs clear to auscultation, decreased air movement to bilateral bases   Mechanical Ventilation: Mode: CPAP with PS, FiO2: 50, PEEP: 8, MAP: 10, PIP: 22    CARDIOVASCULAR  Exam: S1, S2.  Regular rate and rhythm.     GI/NUTRITION  Exam: Soft, nontender, nondistended, incision gaping with fibrinous exudate, rectal pouch in situ for liquid stools  Current Diet:  TF, glucerna 400ml bolus feeds TID    VASCULAR  Exam: Extremities warm, pink, well-perfused.    MUSCULOSKELETAL  Exam: All extremities moving spontaneously without limitations.     METABOLIC/FLUIDS/ELECTROLYTES      HEMATOLOGIC  [x] VTE Prophylaxis: enoxaparin Injectable 40 milliGRAM(s) SubCutaneous daily    Transfusions:	[] PRBC	[] Platelets		[] FFP	[] Cryoprecipitate    INFECTIOUS DISEASE  Antimicrobials/Immunologic Medications:  influenza   Vaccine 0.5 milliLiter(s) IntraMuscular once  piperacillin/tazobactam IVPB. 3.375 Gram(s) IV Intermittent every 8 hours  vancomycin  IVPB 1000 milliGRAM(s) IV Intermittent every 24 hours    Tubes/Lines/Drains  [x] Peripheral IV  [] Central Venous Line     	[] R	[] L	[] IJ	[] Fem	[] SC	Date Placed:   [] Arterial Line		[] R	[] L	[] Fem	[] Rad	[] Ax	Date Placed:   [] PICC		[] Midline		[] Mediport  [] Urinary Catheter		Date Placed:   [x] Necessity of urinary, arterial, and venous catheters discussed    LABS  --------------------------------------------------------------------------------------  ((Insert SICU Labs here))***  --------------------------------------------------------------------------------------    OTHER LABORATORY:     IMAGING STUDIES:   CXR:

## 2019-01-16 NOTE — CHART NOTE - NSCHARTNOTEFT_GEN_A_CORE
Source:  nursing, SICU team, EMR     Diet : NPO with tube feed - Glucerna 1.2 bolus 440 ml over 1 hr every 8 hrs and No Carb Pro source 1 pack daily     Patient intubated , sedated, on EN support , tolerating per nursing , met with SICU team and pt.'s feeds adjusted to attending MD 'S recommendation for optimizing nutrition and for better serum glucose control , noted pt. on levothyroxine and enteral feeds needs to be held 1 hr before and after the medication administration  . Noted wt. decrease from admission - intentional  , pt. with decreased edema , but still 1+ edema- generalized .      Enteral : EN provides 1584 kcal & 94.2 gm protein/d ; i.e. 33.2 kcal/kg ideal wt & 1.97 gm protein/kg ideal wt.     Current Weight: - 104.9 kg on 1/16/19; Current BMI - 43.7 ; 109.2 kg on 1/8/19; Admit wt. - 114.6 kg ; IBW - 47.7KG     Pertinent Medications: MEDICATIONS  (STANDING):  ALBUTerol    90 MICROgram(s) HFA Inhaler 1 Puff(s) Inhalation every 6 hours  amLODIPine   Tablet 10 milliGRAM(s) Oral daily  buDESOnide 160 MICROgram(s)/formoterol 4.5 MICROgram(s) Inhaler 2 Puff(s) Inhalation two times a day  chlorhexidine 0.12% Liquid 15 milliLiter(s) Swish and Spit two times a day  chlorhexidine 4% Liquid 1 Application(s) Topical <User Schedule>  enoxaparin Injectable 40 milliGRAM(s) SubCutaneous daily  furosemide   Injectable 40 milliGRAM(s) IV Push daily  furosemide   Injectable 20 milliGRAM(s) IV Push once  gabapentin   Solution 300 milliGRAM(s) Oral three times a day  hydrocortisone sodium succinate Injectable 25 milliGRAM(s) IV Push every 12 hours  influenza   Vaccine 0.5 milliLiter(s) IntraMuscular once  insulin glargine Injectable (LANTUS) 22 Unit(s) SubCutaneous at bedtime  insulin lispro (HumaLOG) corrective regimen sliding scale   SubCutaneous every 8 hours  insulin lispro Injectable (HumaLOG) 10 Unit(s) SubCutaneous every 8 hours  levothyroxine 137 MICROGram(s) Oral daily  metoprolol tartrate 25 milliGRAM(s) Enteral Tube two times a day  pantoprazole   Suspension 40 milliGRAM(s) Oral daily  piperacillin/tazobactam IVPB. 3.375 Gram(s) IV Intermittent every 8 hours  simvastatin 20 milliGRAM(s) Oral at bedtime  sodium chloride 3%  Inhalation 3 milliLiter(s) Inhalation two times a day  vancomycin  IVPB 1000 milliGRAM(s) IV Intermittent every 24 hours    MEDICATIONS  (PRN):  HYDROmorphone  Injectable 1 milliGRAM(s) IV Push every 4 hours PRN Severe Pain (7 - 10)  LORazepam     Tablet 0.5 milliGRAM(s) Oral two times a day PRN Anxiety  oxyCODONE    Solution 10 milliGRAM(s) Enteral Tube every 4 hours PRN Severe Pain (7 - 10)  oxyCODONE    Solution 5 milliGRAM(s) Enteral Tube every 4 hours PRN Moderate Pain (4 - 6)    Pertinent Labs:  01-16 Na148 mmol/L<H> Glu 82 mg/dL K+ 3.4 mmol/L<L> Cr  1.01 mg/dL BUN 15 mg/dL 01-16 Phos 1.9 mg/dL<L> 01-14 PAB 20 mg/dL      Estimated Needs:  1154 - 1467 KCAL/D ( 11- 14 kcal/kg current wt. ) and protein 95 gm/d ( 2.0 gm/kg IBW) -  recalculated:       Recommend - GLUCERNA 1.2 @ 400 ml every 8 hrs with No Carb Pro source 2 packs daily . This will provide 1440 kcal & 102 gm protein/d . i.e. 30 kcal/kg IBW & 2.1 gm protein/kg IBW      Monitoring and Evaluation:  Tolerance to diet prescription , weights AND follow up per protocol

## 2019-01-16 NOTE — PROGRESS NOTE ADULT - ATTENDING COMMENTS
Critical Care Dx  PM Note  -Pt with ongoing critical care needs. Vent managed to lower O2 concentration. Fluid status evaluated via ultrasound.    Acute Respiratory failure with chronic underlying COPD, hypoxic/hypercapneic  -Ongoing requirement for mechanical vent - CPAP/VCV      Febrile, possibly pneumonia  -Vanco/zosyn dosed.   -Blood cultures positive with E. Fac.     Hyperglycemia  -Basal/bolus regiment  -Steroids decreased but still higher than baseline home requirement   -con't tube feeds - 18 hour bolus feeds    Hypothyroidism  -PO dose of synthroid     RA, COPD  -stress dose steroids were given baseline requirement of prednisone and now with surgical/inflammatory stress.  -keep steroid dose as is - baseline requirement    At risk for malnutrition  -reaching goal tube feedings but has been underfed in post-surgical time period  -monitor prealbumin weekly     Hypokalemia  -replete K+ and Mag as needed    The patient is a critical care patient with life threatening respiratory instability in SICU.  I have personally interviewed and examined the patient, reviewed data and laboratory tests/x-rays and all pertinent electronic images.  The SICU team has a constant risk benefit analyzes discussion with the primary team, all consultants, House Staff and PA's on all decisions.  These diagnoses are unrelated to the surgical procedure noted.  Time involved in performance of separately billable procedures was not counted toward my critical care time. There is no overlap.    I spent 30 minutes in total providing critical care for the diagnoses, assessment and plan above.      I was physically present for the key portions of the evaluation and management (E/M) service provided.  I agree with the above history, physical, and plan which I have reviewed and edited where appropriate.     Ryan Bear MD  Acute and Critical Care Surgery .

## 2019-01-16 NOTE — PROGRESS NOTE ADULT - ASSESSMENT
63F w/ multiple respiratory comorbidities p/w SBO s/p ex-lap with findings of healthy bowel c/b evisceration of bowel s/p ex-lap with placement of retention sutures, now with worsening separation at skin edges requiring SICU level care including intubation for worsed respiratory status.    Plan  - Pain control: dilaudid 1mg IV push q4h PRN, oxy 5&10 via nasogatric tube q4h PRN, gabapentin 300mg BID via NGT  - NPO w/ tube feeds  - Monitor GI fxn   - Monitor respiratory status  - Follow long term care plan      - f/u family plan for discussion of trach w/ patient  - DVT ppx: Lovenox  - Follow care plan per SICU team

## 2019-01-16 NOTE — PROGRESS NOTE ADULT - SUBJECTIVE AND OBJECTIVE BOX
Chief Complaint:     HPI:  63 year old woman with COPD on home O2 (3L), moderate pulmonary HTN, DM, RA on prednisone (10 mg daily x 30 years), HTN, s/p CVA (no residual deficit), s/p  x2 presents with abdominal pain for one day.  She developed mid abdominal cramping pain, sharp, last evening that has been constant and moderate to severe.  It was associated with multiple episodes of nonbloody, nonbilious emesis.  She had a normal bowel movement this morning and has not passed flatus today.      CT scan shows SBO with transition point in the lower abdomen with associated interloop fluid and mesenteric edema. (15 Dec 2018 23:33)      PAST MEDICAL & SURGICAL HISTORY:  Diverticulosis  CVA (Cerebral Vascular Accident)  RA (Rheumatoid Arthritis)  Tooth Abscess  Arthritis  Cigarette Smoker  Chronic Asthma  Adult Hypothyroidism  Borderline Diabetes Mellitus  High Cholesterol  H/O: HTN  Wrist Disorder: S/P Surgery  History of  Section      FAMILY HISTORY:  No pertinent family history in first degree relatives      SOCIAL HISTORY:  [ ] Denies Smoking, Alcohol, or Drug Use    Allergies    No Known Allergies    Intolerances        PAIN MEDICATIONS:  gabapentin   Solution 300 milliGRAM(s) Oral three times a day  HYDROmorphone PCA (1 mG/mL) 30 milliLiter(s) PCA Continuous PCA Continuous  HYDROmorphone PCA (1 mG/mL) Rescue Clinician Bolus 0.5 milliGRAM(s) IV Push every 15 minutes PRN  LORazepam     Tablet 0.5 milliGRAM(s) Oral two times a day PRN  ondansetron Injectable 4 milliGRAM(s) IV Push every 6 hours PRN    Heme:  enoxaparin Injectable 40 milliGRAM(s) SubCutaneous daily    Antibiotics:  piperacillin/tazobactam IVPB. 3.375 Gram(s) IV Intermittent every 8 hours  vancomycin  IVPB 1000 milliGRAM(s) IV Intermittent every 24 hours    Cardiovascular:  amLODIPine   Tablet 10 milliGRAM(s) Oral daily  furosemide   Injectable 40 milliGRAM(s) IV Push daily  furosemide   Injectable 20 milliGRAM(s) IV Push once  metoprolol tartrate 25 milliGRAM(s) Enteral Tube two times a day    GI:  pantoprazole   Suspension 40 milliGRAM(s) Oral daily    Endocrine:  hydrocortisone sodium succinate Injectable 25 milliGRAM(s) IV Push every 12 hours  insulin glargine Injectable (LANTUS) 22 Unit(s) SubCutaneous at bedtime  insulin lispro (HumaLOG) corrective regimen sliding scale   SubCutaneous every 8 hours  insulin lispro Injectable (HumaLOG) 10 Unit(s) SubCutaneous every 8 hours  levothyroxine 137 MICROGram(s) Oral daily  simvastatin 20 milliGRAM(s) Oral at bedtime    All Other Medications:  chlorhexidine 0.12% Liquid 15 milliLiter(s) Swish and Spit two times a day  chlorhexidine 4% Liquid 1 Application(s) Topical <User Schedule>  influenza   Vaccine 0.5 milliLiter(s) IntraMuscular once  naloxone Injectable 0.1 milliGRAM(s) IV Push every 3 minutes PRN      Vital Signs Last 24 Hrs  T(C): 37.7 (2019 12:00), Max: 37.8 (15 Soy 2019 16:00)  T(F): 99.9 (2019 12:00), Max: 100.1 (15 Soy 2019 16:00)  HR: 103 (2019 15:02) (85 - 123)  BP: 118/72 (2019 14:00) (97/53 - 154/68)  BP(mean): 81 (2019 14:00) (57 - 91)  RR: 19 (2019 14:00) (18 - 27)  SpO2: 93% (2019 15:02) (89% - 99%)    PAIN SCORE:        see chart           Physical Exam: A & O x 3 in some discomfort    LABS:                          8.4    10.55 )-----------( 425      ( 2019 02:10 )             27.9     01-16    148<H>  |  107  |  15  ----------------------------<  82  3.4<L>   |  26  |  1.01    Ca    8.6      2019 02:10  Phos  1.9     01-16  Mg     2.1     -16            Drug Screen:        [ ]  ROYCE ZALDIVAR Reviewed and Copied to Chart    Impression/Plan: Pain not adequately relieved with current opioid treatment regimen. Agree with plan to begin Hydromorphone IV PCA and reevaluate by Pain Service tomorrow.    Patient was seen 19 @ 15:06

## 2019-01-16 NOTE — PROGRESS NOTE ADULT - SUBJECTIVE AND OBJECTIVE BOX
Chief Complaint: DM2/steroid/hypothyroid    History: Now on bolus enteral feeds. Remains on vent.    MEDICATIONS  (STANDING):  ALBUTerol    90 MICROgram(s) HFA Inhaler 1 Puff(s) Inhalation every 6 hours  amLODIPine   Tablet 10 milliGRAM(s) Oral daily  buDESOnide 160 MICROgram(s)/formoterol 4.5 MICROgram(s) Inhaler 2 Puff(s) Inhalation two times a day  chlorhexidine 0.12% Liquid 15 milliLiter(s) Swish and Spit two times a day  chlorhexidine 4% Liquid 1 Application(s) Topical <User Schedule>  enoxaparin Injectable 40 milliGRAM(s) SubCutaneous daily  furosemide   Injectable 40 milliGRAM(s) IV Push daily  furosemide   Injectable 20 milliGRAM(s) IV Push once  gabapentin   Solution 300 milliGRAM(s) Oral three times a day  hydrocortisone sodium succinate Injectable 25 milliGRAM(s) IV Push every 12 hours  HYDROmorphone PCA (1 mG/mL) 30 milliLiter(s) PCA Continuous PCA Continuous  influenza   Vaccine 0.5 milliLiter(s) IntraMuscular once  insulin glargine Injectable (LANTUS) 22 Unit(s) SubCutaneous at bedtime  insulin lispro (HumaLOG) corrective regimen sliding scale   SubCutaneous every 8 hours  insulin lispro Injectable (HumaLOG) 10 Unit(s) SubCutaneous every 8 hours  levothyroxine 137 MICROGram(s) Oral daily  metoprolol tartrate 25 milliGRAM(s) Enteral Tube two times a day  pantoprazole   Suspension 40 milliGRAM(s) Oral daily  piperacillin/tazobactam IVPB. 3.375 Gram(s) IV Intermittent every 8 hours  simvastatin 20 milliGRAM(s) Oral at bedtime  sodium chloride 3%  Inhalation 3 milliLiter(s) Inhalation two times a day  tiZANidine 1 milliGRAM(s) Oral every 6 hours  vancomycin  IVPB 1000 milliGRAM(s) IV Intermittent every 24 hours    MEDICATIONS  (PRN):  HYDROmorphone PCA (1 mG/mL) Rescue Clinician Bolus 0.5 milliGRAM(s) IV Push every 15 minutes PRN for Pain Scale GREATER THAN 6  LORazepam     Tablet 0.5 milliGRAM(s) Oral two times a day PRN Anxiety  naloxone Injectable 0.1 milliGRAM(s) IV Push every 3 minutes PRN For ANY of the following changes in patient status:  A. RR LESS THAN 10 breaths per minute, B. Oxygen saturation LESS THAN 90%, C. Sedation score of 6  ondansetron Injectable 4 milliGRAM(s) IV Push every 6 hours PRN Nausea      Allergies    No Known Allergies    Intolerances      Review of Systems:    UNABLE TO OBTAIN    PHYSICAL EXAM:  VITALS: T(C): 37.7 (01-16-19 @ 12:00)  T(F): 99.9 (01-16-19 @ 12:00), Max: 100.1 (01-15-19 @ 16:00)  HR: 100 (01-16-19 @ 15:26) (85 - 123)  BP: 139/78 (01-16-19 @ 15:00) (97/53 - 154/68)  RR:  (18 - 27)  SpO2:  (89% - 99%)  Wt(kg): --  GENERAL: NAD, cushingoid appearance  EYES: No proptosis, no lid lag, anicteric  HEENT:  Atraumatic, Normocephalic, ETT in place  RESPIRATORY: on vent  CARDIOVASCULAR: Regular rate and rhythm  PSYCH: cannot assess      CAPILLARY BLOOD GLUCOSE      POCT Blood Glucose.: 125 mg/dL (16 Jan 2019 14:08)  POCT Blood Glucose.: 80 mg/dL (16 Jan 2019 07:16)  POCT Blood Glucose.: 94 mg/dL (16 Jan 2019 00:08)  POCT Blood Glucose.: 127 mg/dL (15 Soy 2019 21:45)  POCT Blood Glucose.: 331 mg/dL (15 Soy 2019 21:41)  POCT Blood Glucose.: 109 mg/dL (15 Soy 2019 17:40)      01-16    148<H>  |  107  |  15  ----------------------------<  82  3.4<L>   |  26  |  1.01    EGFR if : 70  EGFR if non : 60    Ca    8.6      01-16  Mg     2.1     01-16  Phos  1.9     01-16            Thyroid Function Tests:  12-27 @ 07:15 TSH -- FreeT4 1.18 T3 -- Anti TPO -- Anti Thyroglobulin Ab -- TSI --  12-20 @ 06:45 TSH 40.15 FreeT4 0.98 T3 -- Anti TPO -- Anti Thyroglobulin Ab -- TSI --      Hemoglobin A1C, Whole Blood: 7.1 % <H> [4.0 - 5.6] (12-17-18 @ 03:50)  Hemoglobin A1C, Whole Blood: 7.3 % <H> [4.0 - 5.6] (12-16-18 @ 04:17)

## 2019-01-16 NOTE — PROGRESS NOTE ADULT - ATTENDING COMMENTS
Critical Care Dx    Acute Respiratory failure with chronic underlying COPD, hypoxic/hypercapneic  -tolerating CPAP 12/8/50% - increased since Sunday. Will diurese further   -Flu +, Tamiflu stopped after 5 days (last week)  -Vent overnight if patient tires  -permissive underfeeding to promote less CO2 buildup  -Family to introduce tracheostomy plan to patient on Thursday    Febrile, possibly pneumonia  -CPIS score : 6.  BAL normal.   -Vanco/zosyn dosed. Trend creatinine and vanco troughs.   -Blood cultures positive with E. Fac.   -CT scan w/ PO and IV contrast did not reveal an obvious intra-abdominal source of infection  -No evidence of DVT on tracy    Hyperglycemia  -Basal/bolus regiment  -Steroids decreased but still higher than baseline home requirement   -con't tube feeds - 18 hour bolus feeds    Hypothyroidism  -PO dose of synthroid     RA, COPD  -stress dose steroids were given baseline requirement of prednisone and now with surgical/inflammatory stress.  -keep steroid dose as is - baseline requirement    At risk for malnutrition  -reaching goal tube feedings but has been underfed in post-surgical time period  -monitor prealbumin weekly     The patient is a critical care patient with life threatening respiratory instability in SICU.  I have personally interviewed and examined the patient, reviewed data and laboratory tests/x-rays and all pertinent electronic images.  The SICU team has a constant risk benefit analyzes discussion with the primary team, all consultants, House Staff and PA's on all decisions.  These diagnoses are unrelated to the surgical procedure noted.  Time involved in performance of separately billable procedures was not counted toward my critical care time. There is no overlap.    I spent 55 minutes in total providing critical care for the diagnoses, assessment and plan above.      I was physically present for the key portions of the evaluation and management (E/M) service provided.  I agree with the above history, physical, and plan which I have reviewed and edited where appropriate.     Ryan Bear MD  Acute and Critical Care Surgery . Critical Care Dx    Acute Respiratory failure with chronic underlying COPD, hypoxic/hypercapneic  -tolerating CPAP 12/8/50% - increased since Sunday. Will diurese further   -Flu +, Tamiflu stopped after 5 days (last week)  -Vent overnight if patient tires  -permissive underfeeding to promote less CO2 buildup  -Family to introduce tracheostomy plan to patient on Thursday    Febrile, possibly pneumonia  -CPIS score : 6.  BAL normal.   -Vanco/zosyn dosed. Trend creatinine and vanco troughs.   -Blood cultures positive with E. Fac.   -CT scan w/ PO and IV contrast did not reveal an obvious intra-abdominal source of infection  -No evidence of DVT on tracy    Hyperglycemia  -Basal/bolus regiment  -Steroids decreased but still higher than baseline home requirement   -con't tube feeds - 18 hour bolus feeds    Hypothyroidism  -PO dose of synthroid     RA, COPD  -stress dose steroids were given baseline requirement of prednisone and now with surgical/inflammatory stress.  -keep steroid dose as is - baseline requirement    At risk for malnutrition  -reaching goal tube feedings but has been underfed in post-surgical time period  -monitor prealbumin weekly     Hypokalemia  -replete K+ and Mag as needed    The patient is a critical care patient with life threatening respiratory instability in SICU.  I have personally interviewed and examined the patient, reviewed data and laboratory tests/x-rays and all pertinent electronic images.  The SICU team has a constant risk benefit analyzes discussion with the primary team, all consultants, House Staff and PA's on all decisions.  These diagnoses are unrelated to the surgical procedure noted.  Time involved in performance of separately billable procedures was not counted toward my critical care time. There is no overlap.    I spent 55 minutes in total providing critical care for the diagnoses, assessment and plan above.      I was physically present for the key portions of the evaluation and management (E/M) service provided.  I agree with the above history, physical, and plan which I have reviewed and edited where appropriate.     Ryan Bear MD  Acute and Critical Care Surgery .

## 2019-01-16 NOTE — PROGRESS NOTE ADULT - PROBLEM SELECTOR PLAN 1
Now on bolus feeds.  Glucose tightly controlled this AM.  Would decrease Lantus to 20 units at bedtime  Continue Humalog 10 units prebolus q 8h. Hold if tube feeds are held.  Continue low Humalog scale q8h.  Goal glucose 100-180

## 2019-01-16 NOTE — ADVANCED PRACTICE NURSE CONSULT - ASSESSMENT
Patient intubated, unable to verbalize name but as per sons patient understands where she is and situation, patient is able to respond to commands and name, bedbound, bowel management system in place, indwelling urethral catheter in place. Skin warm, dry with increased moisture in intertriginous folds, adequate skin turgor, scattered areas of ecchymosis on bilateral upper extremities and lower. Generalized +3 edema.    Midline abdominal wound: entire area measures [id number], wound borders are irregular with 10 retention sutures in place. Wound base tissue type 50% minimally moist, yellow/tan slough, 10% adipose tissue that is scattered throughout wound base (some adipose tissue is located along retention sutures) and 40% exposed fascia in center of wound with yellow minimally moist fibrin film. There is undermining located at 4-5 o'clock in center of wound located from fascia (at 3 suture from most distal end of wound) that extends 3.5cm. No other dead space noted. Periwound skin with circumferential hyperpigmentation, no induration, no increased warmth, no erythema. Small amount of serosanguineous drainage, no odor. Goal of care: enzymatic debridement of necrotic tissue, manage exudate while maintaining moist environment, monitor for changes in tissue type.

## 2019-01-17 LAB
ANION GAP SERPL CALC-SCNC: 13 MMO/L — SIGNIFICANT CHANGE UP (ref 7–14)
BUN SERPL-MCNC: 16 MG/DL — SIGNIFICANT CHANGE UP (ref 7–23)
CALCIUM SERPL-MCNC: 8.9 MG/DL — SIGNIFICANT CHANGE UP (ref 8.4–10.5)
CHLORIDE SERPL-SCNC: 106 MMOL/L — SIGNIFICANT CHANGE UP (ref 98–107)
CO2 SERPL-SCNC: 27 MMOL/L — SIGNIFICANT CHANGE UP (ref 22–31)
CREAT SERPL-MCNC: 0.85 MG/DL — SIGNIFICANT CHANGE UP (ref 0.5–1.3)
GLUCOSE SERPL-MCNC: 156 MG/DL — HIGH (ref 70–99)
HCT VFR BLD CALC: 28.3 % — LOW (ref 34.5–45)
HGB BLD-MCNC: 8.4 G/DL — LOW (ref 11.5–15.5)
MAGNESIUM SERPL-MCNC: 2.2 MG/DL — SIGNIFICANT CHANGE UP (ref 1.6–2.6)
MCHC RBC-ENTMCNC: 29.7 % — LOW (ref 32–36)
MCHC RBC-ENTMCNC: 30.3 PG — SIGNIFICANT CHANGE UP (ref 27–34)
MCV RBC AUTO: 102.2 FL — HIGH (ref 80–100)
NRBC # FLD: 0.25 K/UL — LOW (ref 25–125)
NRBC FLD-RTO: 2.2 — SIGNIFICANT CHANGE UP
PHOSPHATE SERPL-MCNC: 2.5 MG/DL — SIGNIFICANT CHANGE UP (ref 2.5–4.5)
PLATELET # BLD AUTO: 415 K/UL — HIGH (ref 150–400)
PMV BLD: 9.8 FL — SIGNIFICANT CHANGE UP (ref 7–13)
POTASSIUM SERPL-MCNC: 4 MMOL/L — SIGNIFICANT CHANGE UP (ref 3.5–5.3)
POTASSIUM SERPL-SCNC: 4 MMOL/L — SIGNIFICANT CHANGE UP (ref 3.5–5.3)
PREALB SERPL-MCNC: 27 MG/DL — SIGNIFICANT CHANGE UP (ref 20–40)
RBC # BLD: 2.77 M/UL — LOW (ref 3.8–5.2)
RBC # FLD: 16.8 % — HIGH (ref 10.3–14.5)
SODIUM SERPL-SCNC: 146 MMOL/L — HIGH (ref 135–145)
WBC # BLD: 11.13 K/UL — HIGH (ref 3.8–10.5)
WBC # FLD AUTO: 11.13 K/UL — HIGH (ref 3.8–10.5)

## 2019-01-17 PROCEDURE — 99232 SBSQ HOSP IP/OBS MODERATE 35: CPT

## 2019-01-17 PROCEDURE — 71045 X-RAY EXAM CHEST 1 VIEW: CPT | Mod: 26

## 2019-01-17 PROCEDURE — 99232 SBSQ HOSP IP/OBS MODERATE 35: CPT | Mod: GC

## 2019-01-17 RX ORDER — ACETAMINOPHEN 500 MG
925 TABLET ORAL ONCE
Qty: 0 | Refills: 0 | Status: COMPLETED | OUTPATIENT
Start: 2019-01-17 | End: 2019-01-17

## 2019-01-17 RX ADMIN — HYDROMORPHONE HYDROCHLORIDE 30 MILLILITER(S): 2 INJECTION INTRAMUSCULAR; INTRAVENOUS; SUBCUTANEOUS at 06:55

## 2019-01-17 RX ADMIN — SODIUM CHLORIDE 3 MILLILITER(S): 9 INJECTION INTRAMUSCULAR; INTRAVENOUS; SUBCUTANEOUS at 09:40

## 2019-01-17 RX ADMIN — Medication 25 MILLIGRAM(S): at 05:23

## 2019-01-17 RX ADMIN — GABAPENTIN 300 MILLIGRAM(S): 400 CAPSULE ORAL at 05:23

## 2019-01-17 RX ADMIN — GABAPENTIN 300 MILLIGRAM(S): 400 CAPSULE ORAL at 14:09

## 2019-01-17 RX ADMIN — Medication 10 UNIT(S): at 08:03

## 2019-01-17 RX ADMIN — CHLORHEXIDINE GLUCONATE 1 APPLICATION(S): 213 SOLUTION TOPICAL at 09:48

## 2019-01-17 RX ADMIN — ALBUTEROL 1 PUFF(S): 90 AEROSOL, METERED ORAL at 15:22

## 2019-01-17 RX ADMIN — CHLORHEXIDINE GLUCONATE 15 MILLILITER(S): 213 SOLUTION TOPICAL at 05:23

## 2019-01-17 RX ADMIN — TIZANIDINE 1 MILLIGRAM(S): 4 TABLET ORAL at 23:06

## 2019-01-17 RX ADMIN — ALBUTEROL 1 PUFF(S): 90 AEROSOL, METERED ORAL at 05:15

## 2019-01-17 RX ADMIN — PANTOPRAZOLE SODIUM 40 MILLIGRAM(S): 20 TABLET, DELAYED RELEASE ORAL at 11:33

## 2019-01-17 RX ADMIN — Medication 25 MILLIGRAM(S): at 17:03

## 2019-01-17 RX ADMIN — Medication 1 MILLIGRAM(S): at 17:02

## 2019-01-17 RX ADMIN — INSULIN GLARGINE 22 UNIT(S): 100 INJECTION, SOLUTION SUBCUTANEOUS at 23:21

## 2019-01-17 RX ADMIN — SODIUM CHLORIDE 3 MILLILITER(S): 9 INJECTION INTRAMUSCULAR; INTRAVENOUS; SUBCUTANEOUS at 21:50

## 2019-01-17 RX ADMIN — PIPERACILLIN AND TAZOBACTAM 25 GRAM(S): 4; .5 INJECTION, POWDER, LYOPHILIZED, FOR SOLUTION INTRAVENOUS at 09:47

## 2019-01-17 RX ADMIN — TIZANIDINE 1 MILLIGRAM(S): 4 TABLET ORAL at 17:05

## 2019-01-17 RX ADMIN — SIMVASTATIN 20 MILLIGRAM(S): 20 TABLET, FILM COATED ORAL at 23:05

## 2019-01-17 RX ADMIN — Medication 137 MICROGRAM(S): at 05:24

## 2019-01-17 RX ADMIN — ALBUTEROL 1 PUFF(S): 90 AEROSOL, METERED ORAL at 09:39

## 2019-01-17 RX ADMIN — PIPERACILLIN AND TAZOBACTAM 25 GRAM(S): 4; .5 INJECTION, POWDER, LYOPHILIZED, FOR SOLUTION INTRAVENOUS at 17:06

## 2019-01-17 RX ADMIN — PIPERACILLIN AND TAZOBACTAM 25 GRAM(S): 4; .5 INJECTION, POWDER, LYOPHILIZED, FOR SOLUTION INTRAVENOUS at 05:24

## 2019-01-17 RX ADMIN — CHLORHEXIDINE GLUCONATE 15 MILLILITER(S): 213 SOLUTION TOPICAL at 17:02

## 2019-01-17 RX ADMIN — GABAPENTIN 300 MILLIGRAM(S): 400 CAPSULE ORAL at 23:05

## 2019-01-17 RX ADMIN — BUDESONIDE AND FORMOTEROL FUMARATE DIHYDRATE 2 PUFF(S): 160; 4.5 AEROSOL RESPIRATORY (INHALATION) at 21:49

## 2019-01-17 RX ADMIN — TIZANIDINE 1 MILLIGRAM(S): 4 TABLET ORAL at 11:33

## 2019-01-17 RX ADMIN — Medication 40 MILLIGRAM(S): at 05:24

## 2019-01-17 RX ADMIN — Medication 925 MILLIGRAM(S): at 13:07

## 2019-01-17 RX ADMIN — HYDROMORPHONE HYDROCHLORIDE 30 MILLILITER(S): 2 INJECTION INTRAMUSCULAR; INTRAVENOUS; SUBCUTANEOUS at 19:48

## 2019-01-17 RX ADMIN — AMLODIPINE BESYLATE 10 MILLIGRAM(S): 2.5 TABLET ORAL at 05:24

## 2019-01-17 RX ADMIN — Medication 2: at 23:09

## 2019-01-17 RX ADMIN — Medication 25 MILLIGRAM(S): at 05:24

## 2019-01-17 RX ADMIN — Medication 10 UNIT(S): at 23:09

## 2019-01-17 RX ADMIN — ENOXAPARIN SODIUM 40 MILLIGRAM(S): 100 INJECTION SUBCUTANEOUS at 11:32

## 2019-01-17 RX ADMIN — BUDESONIDE AND FORMOTEROL FUMARATE DIHYDRATE 2 PUFF(S): 160; 4.5 AEROSOL RESPIRATORY (INHALATION) at 09:39

## 2019-01-17 RX ADMIN — Medication 250 MILLIGRAM(S): at 05:25

## 2019-01-17 RX ADMIN — Medication 10 UNIT(S): at 14:10

## 2019-01-17 RX ADMIN — TIZANIDINE 1 MILLIGRAM(S): 4 TABLET ORAL at 05:24

## 2019-01-17 RX ADMIN — Medication 925 MILLIGRAM(S): at 14:05

## 2019-01-17 RX ADMIN — ALBUTEROL 1 PUFF(S): 90 AEROSOL, METERED ORAL at 21:30

## 2019-01-17 NOTE — PROGRESS NOTE ADULT - SUBJECTIVE AND OBJECTIVE BOX
Follow Up:      Inverval History/ROS:Patient is a 63y old  Female who presents with a chief complaint of Abdominal Pain (17 Jan 2019 09:33)  Alert.  On vent  Feels okay . No new pain.      Allergies    No Known Allergies    Intolerances        ANTIMICROBIALS:  piperacillin/tazobactam IVPB. 3.375 every 8 hours  vancomycin  IVPB 1000 every 24 hours      OTHER MEDS:  ALBUTerol    90 MICROgram(s) HFA Inhaler 1 Puff(s) Inhalation every 6 hours  amLODIPine   Tablet 10 milliGRAM(s) Oral daily  buDESOnide 160 MICROgram(s)/formoterol 4.5 MICROgram(s) Inhaler 2 Puff(s) Inhalation two times a day  chlorhexidine 0.12% Liquid 15 milliLiter(s) Swish and Spit two times a day  chlorhexidine 4% Liquid 1 Application(s) Topical <User Schedule>  enoxaparin Injectable 40 milliGRAM(s) SubCutaneous daily  furosemide   Injectable 40 milliGRAM(s) IV Push daily  gabapentin   Solution 300 milliGRAM(s) Oral three times a day  hydrocortisone sodium succinate Injectable 25 milliGRAM(s) IV Push every 12 hours  HYDROmorphone PCA (1 mG/mL) 30 milliLiter(s) PCA Continuous PCA Continuous  HYDROmorphone PCA (1 mG/mL) Rescue Clinician Bolus 0.5 milliGRAM(s) IV Push every 15 minutes PRN  influenza   Vaccine 0.5 milliLiter(s) IntraMuscular once  insulin glargine Injectable (LANTUS) 22 Unit(s) SubCutaneous at bedtime  insulin lispro (HumaLOG) corrective regimen sliding scale   SubCutaneous every 8 hours  insulin lispro Injectable (HumaLOG) 10 Unit(s) SubCutaneous every 8 hours  levothyroxine 137 MICROGram(s) Oral daily  LORazepam     Tablet 0.5 milliGRAM(s) Oral two times a day PRN  metoprolol tartrate 25 milliGRAM(s) Enteral Tube two times a day  naloxone Injectable 0.1 milliGRAM(s) IV Push every 3 minutes PRN  ondansetron Injectable 4 milliGRAM(s) IV Push every 6 hours PRN  pantoprazole   Suspension 40 milliGRAM(s) Oral daily  simvastatin 20 milliGRAM(s) Oral at bedtime  sodium chloride 3%  Inhalation 3 milliLiter(s) Inhalation two times a day  tiZANidine 1 milliGRAM(s) Oral every 6 hours      Vital Signs Last 24 Hrs  T(C): 37.7 (17 Jan 2019 12:00), Max: 37.7 (17 Jan 2019 12:00)  T(F): 99.9 (17 Jan 2019 12:00), Max: 99.9 (17 Jan 2019 12:00)  HR: 111 (17 Jan 2019 14:00) (89 - 128)  BP: 104/83 (17 Jan 2019 14:00) (85/49 - 139/78)  BP(mean): 87 (17 Jan 2019 14:00) (49 - 97)  RR: 23 (17 Jan 2019 14:00) (17 - 29)  SpO2: 94% (17 Jan 2019 14:00) (89% - 98%)    PHYSICAL EXAM:  General: [x ] non-toxic  HEAD/EYES: [ ] PERRL [ x] white sclera [ ] icterus  ENT:  [ ] normal [ ] supple [ ] thrush [ ] pharyngeal exudate  Cardiovascular:   [ ] murmur [x ] normal [ ] PPM/AICD  Respiratory:  [ x] clear to ausculation bilaterally  GI:  [x ] soft, non-tender, normal bowel sounds  wound some superificial drainage    :  [ ] iraheta [ ] no CVA tenderness   Musculoskeletal:  [ ] no synovitis  Neurologic:  [ ] non-focal exam   Skin:  [x ] no rash  Lymph: [ x] no lymphadenopathy  Psychiatric:  [ ] appropriate affect [ ] alert & oriented  Lines:  [x ] no phlebitis [ ] central line                                8.4    11.13 )-----------( 415      ( 17 Jan 2019 03:00 )             28.3       01-17    146<H>  |  106  |  16  ----------------------------<  156<H>  4.0   |  27  |  0.85    Ca    8.9      17 Jan 2019 03:00  Phos  2.5     01-17  Mg     2.2     01-17            MICROBIOLOGY:  blood cx 1/17 testing    RADIOLOGY:

## 2019-01-17 NOTE — PROGRESS NOTE ADULT - ASSESSMENT
63 year old female with RA immunosuppressed from chronic steroids, underlying DM, presented on 12/15 with SBO.  S/p laparotomy and subsequent revision, now with respiratory failure and sepsis due to an enterococcal bacteremia.  Course/Infection complicated by underlying DM/ immuno suppression. High risk for complications.    Critically ill with Respiratory failure    1) Enterococcal bacteremia: Enterococcus in the blood is most often form a , abdominal, wound or line infection.   the patient has risk for all of the above.    Recent C ta/p without abscess but concern for midline wound infection  -Wound culture with mixed growth (including enterococcus but different species)    -S/p line change    -Repeat blood culture testing  -Consider MATEO as pt continues to have fever  -Continue vancomycin    2) Abdominal Wound: retention sutures in place  Some drainage from lower pole  No abscess on CT    Wound Care  Continue vanco/ zosyn to cover gram negative/anerobe  as well-  Consider stopping zosyn in next 24-48 hours    3) Immunosuppressed: due ot steroid use  Impairs wound healing    4) DM: glycemic control

## 2019-01-17 NOTE — CHART NOTE - NSCHARTNOTEFT_GEN_A_CORE
64 yo female with morbid obesity, former smoker, COPD on home oxygen 3L, pulm HTN, CHRIS on Bilevel at home (not compliant), RA on prednisone 10 mg long term use, c sections in past admitted LI on 12/15/18 for NBNB vomiting and abdominal pain for one day. CT scan showed signs of SBO, taken to OR for exploratory laparotomy, adhesiolysis done by Dr. Smith but no transition point was found, 4 retention sutures were placed. Pt transferred to SICU post op for intentional delayed extubation due to mulitple pulmonary comorbities. Pain managed with PCA dilaudid.   Pt returned to OR for evisceration of bowels on 12/30/18, underwent exploratory laparotomy and closure with 10 retention sutures done after confirming helathy bowels. Pt returned to SICU for delayed intentional extubation and pain managed with PCA dialudid and extubated next day.   Pt started spiking fevers since 1/1m started on vanco and zosyn as post op. Blood culture 12/31 and 1/1 NG, sputum cx 2/1 normal respi jackelyn, RVP positive. Pt sent to RCU for further care as she did not need active surgical care. Pt returned to SICU on 1/5/18 for hypoxemic and hypercarbic RF, got intubated in SICU on 1/6. Routine care  with inahlaers, metanebs, lasix given and CPAP trials continued. Pt tolerated weaning down to PS trial of 10/5 with FiO2 40%.   pt continued to have low grade fever, 1/9 bronchoscopy, BAL grew normal respi jackelyn. Plan for extubation on 1/10, spiked fever 1/12, blood culture grew Enterococci, vanco and zosyn resumed. 1/12 pt also almost self extubated, reintubated and sedated with propofol. Weaned off fentanyl gtt, precedex gtt and propofol gtt and now pt is on PCA dialaudid at 0.1 mg/hr infusion with 0.3 mg demand dose every 6 min, lock out at 4 mg/4 hours. Pt comfortable with it. Cpap ing with 8/12 with 40% FiO2, Et in situ. Left IJV cannulated with 3 lumen line.   Family meeting with Dr Smith and Dr Nicole on 1/15, surgical care transferred to Dr Nicole/ Dr Leonard ACS team. Dr Lisker, pulmonologist following and as per recommendations, pt transferred to MICU for possible extubation trial and further pulm management today.   Pt's son Norris wants to be informed about all clinical updates and make all medical decisions as he is HCP, pt herself understands all clinical information and able to make her decisions. Family also unhappy about care she has received till now. Family relations informed about familial dispute. Currently, pt's chart has list of people who can get clinical information to avoid violating HIPPA. Pt's family also wanted pt to be transferred to [facility] for further management. Dr. Bear, SICU attending spoke to transfer attending at [facility], pending insurance related decisions and [facility]'s acceptance decision as well. 62 yo female with morbid obesity, former smoker, COPD on home oxygen 3L, pulm HTN, CHRIS on Bilevel at home (not compliant), RA on prednisone 10 mg long term use, hypothyroid, DM, c sections in past admitted [facility] on 12/15/18 for NBNB vomiting and abdominal pain for one day. CT scan showed signs of SBO, taken to OR for exploratory laparotomy, adhesiolysis done by Dr. Smith but no transition point was found, 4 retention sutures were placed. Pt transferred to SICU post op for intentional delayed extubation due to mulitple pulmonary comorbities. Pain managed with PCA dilaudid.   Pt returned to OR for evisceration of bowels on 12/30/18, underwent exploratory laparotomy and closure with 10 retention sutures done after confirming helathy bowels. Pt returned to SICU for delayed intentional extubation and pain managed with PCA dialudid and extubated next day.   Pt started spiking fevers since 1/1m started on vanco and zosyn as post op. Blood culture 12/31 and 1/1 NG, sputum cx 2/1 normal respi jackelyn, RVP positive. Pt sent to RCU for further care as she did not need active surgical care. Pt returned to SICU on 1/5/18 for hypoxemic and hypercarbic RF, got intubated in SICU on 1/6. Routine care  with inahlaers, metanebs, lasix given and CPAP trials continued. Pt tolerated weaning down to PS trial of 10/5 with FiO2 40%.   pt continued to have low grade fever, 1/9 bronchoscopy, BAL grew normal respi jackelyn. Plan for extubation on 1/10, spiked fever 1/12, blood culture grew Enterococci, vanco and zosyn resumed. 1/12 pt also almost self extubated, reintubated and sedated with propofol. Weaned off fentanyl gtt, precedex gtt and propofol gtt and now pt is on PCA dialaudid at 0.1 mg/hr infusion with 0.3 mg demand dose every 6 min, lock out at 4 mg/4 hours. Pt comfortable with it. Cpap ing with 8/12 with 40% FiO2, Et in situ. Left IJV cannulated with 3 lumen line.   Family meeting with Dr Smith and Dr Nicole on 1/15, surgical care transferred to Dr Nicole/ Dr Leonard ACS team. Dr Lisker, pulmonologist following and as per recommendations, pt transferred to MICU for possible extubation trial and further pulm management today.   Pt's son Norris wants to be informed about all clinical updates and make all medical decisions as he is HCP, pt herself understands all clinical information and able to make her decisions. Family also unhappy about care she has received till now. Family relations informed about familial dispute. Currently, pt's chart has list of people who can get clinical information to avoid violating HIPPA. Pt's family also wanted pt to be transferred to [facility] for further management. Dr. Bear, SICU attending spoke to transfer attending at [facility], pending insurance related decisions and [facility]'s acceptance decision as well.       -primary surgical team ACS  -endo following for sugar control  -ID for Enterococci bacteremia  -wound care following   -pain management for PCA management

## 2019-01-17 NOTE — PROGRESS NOTE ADULT - SUBJECTIVE AND OBJECTIVE BOX
B-Team Surgery Progress Note     Subjective/24hour Events: Currently intubated. PRN ativan for anxiety. Patient on basal/bolus insulin. NG tube feeds.    ICU Vital Signs Last 24 Hrs  T(C): 37.6 (17 Jan 2019 08:00), Max: 37.7 (16 Jan 2019 12:00)  T(F): 99.7 (17 Jan 2019 08:00), Max: 99.9 (16 Jan 2019 12:00)  HR: 106 (17 Jan 2019 11:00) (89 - 128)  BP: 127/64 (17 Jan 2019 11:00) (85/49 - 154/68)  BP(mean): 77 (17 Jan 2019 11:00) (49 - 97)  ABP: --  ABP(mean): --  RR: 18 (17 Jan 2019 11:00) (17 - 29)  SpO2: 92% (17 Jan 2019 11:00) (89% - 98%)    CAPILLARY BLOOD GLUCOSE      POCT Blood Glucose.: 115 mg/dL (17 Jan 2019 06:11)  POCT Blood Glucose.: 260 mg/dL (16 Jan 2019 23:31)  POCT Blood Glucose.: 125 mg/dL (16 Jan 2019 14:08)        16 Jan 2019 07:01  -  17 Jan 2019 07:00  --------------------------------------------------------  IN:    Enteral Tube Flush: 80 mL    Glucerna: 1280 mL    IV PiggyBack: 550 mL  Total IN: 1910 mL    OUT:    Rectal Tube: 500 mL    Voided: 1400 mL  Total OUT: 1900 mL    Total NET: 10 mL      17 Jan 2019 07:01  -  17 Jan 2019 11:17  --------------------------------------------------------  IN:  Total IN: 0 mL    OUT:    Voided: 200 mL  Total OUT: 200 mL    Total NET: -200 mL      MEDICATIONS  (STANDING):  ALBUTerol    90 MICROgram(s) HFA Inhaler 1 Puff(s) Inhalation every 6 hours  amLODIPine   Tablet 10 milliGRAM(s) Oral daily  buDESOnide 160 MICROgram(s)/formoterol 4.5 MICROgram(s) Inhaler 2 Puff(s) Inhalation two times a day  chlorhexidine 0.12% Liquid 15 milliLiter(s) Swish and Spit two times a day  chlorhexidine 4% Liquid 1 Application(s) Topical <User Schedule>  enoxaparin Injectable 40 milliGRAM(s) SubCutaneous daily  furosemide   Injectable 40 milliGRAM(s) IV Push daily  gabapentin   Solution 300 milliGRAM(s) Oral three times a day  hydrocortisone sodium succinate Injectable 25 milliGRAM(s) IV Push every 12 hours  HYDROmorphone PCA (1 mG/mL) 30 milliLiter(s) PCA Continuous PCA Continuous  influenza   Vaccine 0.5 milliLiter(s) IntraMuscular once  insulin glargine Injectable (LANTUS) 22 Unit(s) SubCutaneous at bedtime  insulin lispro (HumaLOG) corrective regimen sliding scale   SubCutaneous every 8 hours  insulin lispro Injectable (HumaLOG) 10 Unit(s) SubCutaneous every 8 hours  levothyroxine 137 MICROGram(s) Oral daily  metoprolol tartrate 25 milliGRAM(s) Enteral Tube two times a day  pantoprazole   Suspension 40 milliGRAM(s) Oral daily  piperacillin/tazobactam IVPB. 3.375 Gram(s) IV Intermittent every 8 hours  simvastatin 20 milliGRAM(s) Oral at bedtime  sodium chloride 3%  Inhalation 3 milliLiter(s) Inhalation two times a day  tiZANidine 1 milliGRAM(s) Oral every 6 hours  vancomycin  IVPB 1000 milliGRAM(s) IV Intermittent every 24 hours    MEDICATIONS  (PRN):  HYDROmorphone PCA (1 mG/mL) Rescue Clinician Bolus 0.5 milliGRAM(s) IV Push every 15 minutes PRN for Pain Scale GREATER THAN 6  LORazepam     Tablet 0.5 milliGRAM(s) Oral two times a day PRN Anxiety  naloxone Injectable 0.1 milliGRAM(s) IV Push every 3 minutes PRN For ANY of the following changes in patient status:  A. RR LESS THAN 10 breaths per minute, B. Oxygen saturation LESS THAN 90%, C. Sedation score of 6  ondansetron Injectable 4 milliGRAM(s) IV Push every 6 hours PRN Nausea    Physical Exam:  Gen: NAD  LS: nml respiratory effort  Card: pulse regularly present  GI: abd soft, retention sutures in place, aquacel in place w/ dressing c/d/i  Ext: warm      Labs:    CBC (01-17 @ 03:00)                              8.4<L>                         11.13<H>  )----------------(  415<H>     --    % Neutrophils, --    % Lymphocytes, ANC: --                                  28.3<L>              CBC (01-16 @ 02:10)                              8.4<L>                         10.55<H>  )----------------(  425<H>     --    % Neutrophils, --    % Lymphocytes, ANC: --                                  27.9<L>                BMP (01-17 @ 03:00)             146<H>  |  106     |  16    		Ca++ --      Ca 8.9                ---------------------------------( 156<H>		Mg 2.2                4.0     |  27      |  0.85  			Ph 2.5     BMP (01-16 @ 02:10)             148<H>  |  107     |  15    		Ca++ --      Ca 8.6                ---------------------------------( 82    		Mg 2.1                3.4<L>  |  26      |  1.01  			Ph 1.9<L>            -> OTHER Culture (01-12 @ 21:49)     NG    Enterococcus faecalis  Corynebacterium species  NG    -> BLOOD PERIPHERAL Culture (01-11 @ 21:31)       ***** CRITICAL RESULT *****  PERSON CALLED / READ-BACK: /YES  DATE / TIME CALLED: 01/12/19 1236  CALLED BY: DIANN RUSHING  GPCPR^Gram Pos Cocci in Pairs  AFTER: 14 HOURS INCUBATION  BOTTLE: AEROBIC BOTTLE  GPCCH^Gram Pos Cocci in Chains  AFTER: 14 HOURS INCUBATION  BOTTLE: AEROBIC BOTTLE    BLOOD CULTURE PCR  Enterococcus faecium  NG    -> BLOOD VENOUS Culture (01-11 @ 20:25)     NG    NG  NG    -> BRONCHIAL LAVAGE Culture (01-09 @ 12:31)       GPR^Gram Positive Rods  QUANTITY OF BACTERIA SEEN: RARE (1+)  WBC^White Blood Cells  QNTY CELLS IN GRAM STAIN: RARE (1+)      NRF^Normal Respiratory Perla  QUANTITY OF GROWTH: MANY  NG

## 2019-01-17 NOTE — PROGRESS NOTE ADULT - SUBJECTIVE AND OBJECTIVE BOX
Anesthesia Pain Management Service    SUBJECTIVE: Patient is doing well with IV PCA and no significant problems reported.    Pain Scale Score	At rest: ___ 	With Activity: ___ 	[X ] Refer to charted pain scores    THERAPY:    [ ] IV PCA Morphine		[ ] 5 mg/mL	[ ] 1 mg/mL  [X ] IV PCA Hydromorphone	[ ] 5 mg/mL	[X ] 1 mg/mL  [ ] IV PCA Fentanyl		[ ] 50 micrograms/mL    Demand dose __0.3_ lockout __6_ (minutes) Continuous Rate _0.1__ Total: ___  mg used (in past 24 hours)      MEDICATIONS  (STANDING):  ALBUTerol    90 MICROgram(s) HFA Inhaler 1 Puff(s) Inhalation every 6 hours  amLODIPine   Tablet 10 milliGRAM(s) Oral daily  buDESOnide 160 MICROgram(s)/formoterol 4.5 MICROgram(s) Inhaler 2 Puff(s) Inhalation two times a day  chlorhexidine 0.12% Liquid 15 milliLiter(s) Swish and Spit two times a day  chlorhexidine 4% Liquid 1 Application(s) Topical <User Schedule>  enoxaparin Injectable 40 milliGRAM(s) SubCutaneous daily  furosemide   Injectable 40 milliGRAM(s) IV Push daily  gabapentin   Solution 300 milliGRAM(s) Oral three times a day  hydrocortisone sodium succinate Injectable 25 milliGRAM(s) IV Push every 12 hours  HYDROmorphone PCA (1 mG/mL) 30 milliLiter(s) PCA Continuous PCA Continuous  influenza   Vaccine 0.5 milliLiter(s) IntraMuscular once  insulin glargine Injectable (LANTUS) 22 Unit(s) SubCutaneous at bedtime  insulin lispro (HumaLOG) corrective regimen sliding scale   SubCutaneous every 8 hours  insulin lispro Injectable (HumaLOG) 10 Unit(s) SubCutaneous every 8 hours  levothyroxine 137 MICROGram(s) Oral daily  metoprolol tartrate 25 milliGRAM(s) Enteral Tube two times a day  pantoprazole   Suspension 40 milliGRAM(s) Oral daily  piperacillin/tazobactam IVPB. 3.375 Gram(s) IV Intermittent every 8 hours  simvastatin 20 milliGRAM(s) Oral at bedtime  sodium chloride 3%  Inhalation 3 milliLiter(s) Inhalation two times a day  tiZANidine 1 milliGRAM(s) Oral every 6 hours  vancomycin  IVPB 1000 milliGRAM(s) IV Intermittent every 24 hours    MEDICATIONS  (PRN):  HYDROmorphone PCA (1 mG/mL) Rescue Clinician Bolus 0.5 milliGRAM(s) IV Push every 15 minutes PRN for Pain Scale GREATER THAN 6  LORazepam     Tablet 0.5 milliGRAM(s) Oral two times a day PRN Anxiety  naloxone Injectable 0.1 milliGRAM(s) IV Push every 3 minutes PRN For ANY of the following changes in patient status:  A. RR LESS THAN 10 breaths per minute, B. Oxygen saturation LESS THAN 90%, C. Sedation score of 6  ondansetron Injectable 4 milliGRAM(s) IV Push every 6 hours PRN Nausea      OBJECTIVE:    Sedation Score:	[ X] Alert	[ ] Drowsy 	[ ] Arousable	[ ] Asleep	[ ] Unresponsive    Side Effects:	[X ] None	[ ] Nausea	[ ] Vomiting	[ ] Pruritus  		[ ] Other:    Vital Signs Last 24 Hrs  T(C): 37.6 (17 Jan 2019 08:00), Max: 37.7 (16 Jan 2019 12:00)  T(F): 99.7 (17 Jan 2019 08:00), Max: 99.9 (16 Jan 2019 12:00)  HR: 104 (17 Jan 2019 09:00) (89 - 118)  BP: 130/78 (17 Jan 2019 09:00) (85/49 - 154/68)  BP(mean): 90 (17 Jan 2019 09:00) (49 - 97)  RR: 22 (17 Jan 2019 09:00) (17 - 29)  SpO2: 91% (17 Jan 2019 09:00) (89% - 96%)    ASSESSMENT/ PLAN    Therapy to  be:	[ X] Continue   [ ] Discontinued   [ ] Change to prn Analgesics    Documentation and Verification of current medications:   [X] Done	[ ] Not done, not elligible    Comments:  continue IVPCA  continue gabapentin and tizanidine

## 2019-01-17 NOTE — PROGRESS NOTE ADULT - ATTENDING COMMENTS
I have examined this patient, reviewed pertinent labs and imaging, and discussed the case with colleagues, residents, physician assistants, and nurses on SICU rounds.      35   minutes in total were spent in providing direct critical care for the diagnoses, assessment and plan outlined below.  These diagnoses are unrelated to the surgical procedure.  Additionally, time spent in the performance of separately billable procedures was not counted toward the critical care time.  There is no overlap.    The active critical care issues are:  1. acute on chronic mixed hypoxemic and hypercarbic respiratory insufficiency, possibly may benefit from trach as a bridge to cont'd vent weaning  2. acute on chronic pain/anxiety, on ativan  3. malnutrition-disease related and exacerbated by DM/steroids  4. bacteremia  5. poorly healing wound-multifactorial (malnutrition, steroids, infection), continue supportive care and wound care    Per family's request, we are attempting to coordinate transfer of care to [facility] versus MICU.

## 2019-01-17 NOTE — PROGRESS NOTE ADULT - ASSESSMENT
63F with PMHx rheumatoid arthritis (on chronic Prednisone 10mg QD x30 years), morbid obesity, CHRIS/OHS on qhs NIV bilevel, former smoker with COPD and pulmHTN (WHO I/III) who presented to [facility] 12/15 with acute onset abdominal pain and NBNB vomiting 2/2 SBO s/p emergent exploratory laparotomy with lysis of adhesions (12/16) c/b persistent episodes of nausea/vomiting, repeat OR on 12/30 with prolonged intubation. Was then transferred ot RCU after extubation where she developed progressive respiratory distress was found to be Influenza A+, required intubation on 1/6/19 for respiratory failure and transferred back to the SICU. She self extubated on 1/12/18 and was reintubated for respiratory failure. Hospital course complicated by worsening sepsis, found to have Enterococcus Faecium Has been on Cpap since this morning with good tidal volumes. Increased Peep with goal of lung recruitment. Currently comfortable and interactive, on minimal sedation (fental used given hx of chronic pain).   - Out of bed to chair => use aspen lift  - continue CPAP trial  - If ABG improves (improved pH) with Diamox, agree with plan for extubation tomorrow  - Agree with current inhaler therapy: Symbicort 160-4.5 mcg 2 puffs BID and Spiriva 18mcg qhs. Can c/w Albuterol inhaler prn. No indication for escalation of therapy.  - Continue with NaCl nebs and chest PT  Colleen Tidwell MD  Pulmonary & Critical Care Medicine Fellow | PGY-4  [phone] 63F with PMHx rheumatoid arthritis (on chronic Prednisone 10mg QD x30 years), morbid obesity, CHRIS/OHS on qhs NIV bilevel, former smoker with COPD and pulmHTN (WHO I/III) who presented to [facility] 12/15 with acute onset abdominal pain and NBNB vomiting 2/2 SBO s/p emergent exploratory laparotomy with lysis of adhesions (12/16) c/b persistent episodes of nausea/vomiting, repeat OR on 12/30 with prolonged intubation. Was then transferred ot RCU after extubation where she developed progressive respiratory distress was found to be Influenza A+, required intubation on 1/6/19 for respiratory failure and transferred back to the SICU. She self extubated on 1/12/18 and was reintubated for respiratory failure. Hospital course complicated by worsening sepsis, found to have Enterococcus Faecium bacteremia. Family planning of transfer to different hospital.   - If not being transferred would stop all sedative medications, including PCA, can use Dexmedetomidine if needed  - Would Place patient on CPAP and attempt extubation.   - Out of bed to chair => can use aspen lift if needed  - Agree with current inhaler therapy: Symbicort 160-4.5 mcg 2 puffs BID and Spiriva 18mcg qhs. Can c/w Albuterol inhaler prn. No indication for escalation of therapy.  - Continue with NaCl nebs and chest PT  Colleen Tidwell MD  Pulmonary & Critical Care Medicine Fellow | PGY-4  [phone] 63F with PMHx rheumatoid arthritis (on chronic Prednisone 10mg QD x30 years), morbid obesity, CHRIS/OHS on qhs NIV bilevel, former smoker with COPD and pulmHTN (WHO I/III) who presented to [facility] 12/15 with acute onset abdominal pain and NBNB vomiting 2/2 SBO s/p emergent exploratory laparotomy with lysis of adhesions (12/16) c/b persistent episodes of nausea/vomiting, repeat OR on 12/30 with prolonged intubation. Was then transferred ot RCU after extubation where she developed progressive respiratory distress was found to be Influenza A+, required intubation on 1/6/19 for respiratory failure and transferred back to the SICU. She self extubated on 1/12/18 and was reintubated for respiratory failure. Hospital course complicated by worsening sepsis, found to have Enterococcus Faecium bacteremia. Family planning of transfer to different hospital.   - If not being transferred would stop all sedative medications, including PCA, can use Dexmedetomidine if needed  - Would Place patient on CPAP and attempt extubation.   - Out of bed to chair => can use aspen lift if needed    - Continue with NaCl nebs and chest PT  Colleen Tidwell MD  Pulmonary & Critical Care Medicine Fellow | PGY-4  [phone] 63F with PMHx rheumatoid arthritis (on chronic Prednisone 10mg QD x30 years), morbid obesity, CHRIS/OHS on qhs NIV bilevel, former smoker with COPD and pulmHTN (WHO I/III) who presented to [facility] 12/15 with acute onset abdominal pain and NBNB vomiting 2/2 SBO s/p emergent exploratory laparotomy with lysis of adhesions (12/16) c/b persistent episodes of nausea/vomiting, repeat OR on 12/30 with prolonged intubation. Was then transferred ot RCU after extubation where she developed progressive respiratory distress was found to be Influenza A+, required intubation on 1/6/19 for respiratory failure and transferred back to the SICU. She self extubated on 1/12/18 and was reintubated for respiratory failure. Hospital course complicated by worsening sepsis, found to have Enterococcus Faecium bacteremia. Family considering transfer to different hospital vs transfer to MICU for extubation trial once bed is available.   Plan for transfer to MICU if not going to [facility] tomorrow  - Continue CPAP and Plan to attempt extubation once in MICU  - Out of bed to chair => can use aspen lift if needed  - Continue with NaCl nebs and chest PT  Colleen Tidwell MD  Pulmonary & Critical Care Medicine Fellow | PGY-4  [phone]

## 2019-01-17 NOTE — PROGRESS NOTE ADULT - ASSESSMENT
63y Female hx of COPD presented initially with SBO, underwent surgery closed with 4 retention sutures, evicerated on 12/30 and closed with 6 more retention sutures, now c/b multifocal pneumonia, flu and respiratory distress /hypoxia on continuous bipap. Patient temporarily transferred to RCU, but developed hypercapnia and respiratory distress requiring intubation 1/6 and subsequent transfer to SICU. S/p bronch 1/9/18    #neuro  Awake, alert, follows commands, RASS 0  - continue ativan PRN for discomfort/ anxiety  - Pain control w/ PCA and tinazadine  - Appreciate acute pain service input   - C/w gabapentin    #resp  Hx COPD and pulm HTN, current smoker, course c/b viral pneumonia, s/p bronch 1/9,  - Intubated, on AC CMV now, FIO2 50%, CPAP trial every day  - C/w inhalers, pulmozyme, pulmonary toilet   - OOB to chair   - appreciate pulm recs     #CV  HD stable  - Continue amlodipine 10mg  - Metoprolol 25mg po BID     #GI  s/p ex-lap for evisceration, 10 retention sutures in situ, gaping wound covered in fibrinous exudate   - Daily wound check and dressing by RN  - c/w protonix daily, home med  - Patient transitioned to bolus tube feeds, tolerating well    #renal  - BMP and I&O monitoring as routine   - iraheta removed, patient spontaneously voiding   - Repletion of electrolytes PRN  - Lasix 40mg IV qd    #heme  - H/H stable   - VTE ppx w/ Lovenox   - b/l LE venous duplex 1/14 no s/o DVT, calf veins not visualized well, IPCs and heparin    #ID  spiking fevers since friday, blood cultures sent, started on zosyn and vanco  - blood cultures growing Enterococcus faecium, wound culture 1/12 growing gpc and gpr,  - lines: iraheta, rt IJV (1/6) changed to lt IJV 1/13,  amaury, ET  - TTE 1/12: good LV function, moderate pulm HTN, endocarditis can not be ruled out, needs MATEO if strongly suspected   - appreciate ID consult, ct abx  - Amenable to repeat blood cultures     #endo  - hx RA on chronic prednisone, DM, hypothyroid  - Hydrocortisone 25 mg BID, do not titrate further down as this is nearly equivalent to home dose   - Synthroid 137mcg qhs through ngt   - Endo following, appreciate recs  - Lantus 22 units q hs  - Humalog 10units TID AC (prior to bolus feeds)    Lines:  -left IJ central line  -amaury feeding tube  -ET    #dispo  -SICU 63y Female hx of COPD presented initially with SBO, underwent surgery closed with 4 retention sutures, evicerated on 12/30 and closed with 6 more retention sutures, now c/b multifocal pneumonia, flu and respiratory distress /hypoxia on continuous bipap. Patient temporarily transferred to RCU, but developed hypercapnia and respiratory distress requiring intubation 1/6 and subsequent transfer to SICU. S/p bronch 1/9/18    #neuro  Awake, alert, follows commands, RASS 0 to +1  - on ativan 0.5 mg PO PRN for discomfort/ anxiety  - Pain control w/ PCA dilaudid and tizanidine, pt s/p ex lap with 10 retention sutures  - Appreciate acute pain service input   - C/w gabapentin 300mg tid    #resp  Hx COPD and moderate pulm HTN, former smoker, course c/b viral pneumonia, s/p bronch 1/9,  - Intubated, on AC CMV now, FIO2 50%, CPAP trial every day  - C/w inhalers, pulmozyme, pulmonary toilet   - OOB to chair   - appreciate pulm recs     #CV  HD stable  - Continue amlodipine 10mg  - Metoprolol 25mg po BID     #GI  s/p ex-lap for evisceration, 10 retention sutures in situ, gaping wound covered in fibrinous exudate   - Daily wound check and dressing by RN  - c/w protonix daily, home med  - Patient transitioned to bolus tube feeds, tolerating well    #renal  - BMP and I&O monitoring as routine   - iraheta removed, patient spontaneously voiding   - Repletion of electrolytes PRN  - Lasix 40mg IV qd    #heme  - H/H stable   - VTE ppx w/ Lovenox   - b/l LE venous duplex 1/14 no s/o DVT, calf veins not visualized well, IPCs and heparin    #ID  spiking fevers since friday, blood cultures sent, started on zosyn and vanco  - blood cultures growing Enterococcus faecium, wound culture 1/12 growing gpc and gpr,  - lines: iraheta, rt IJV (1/6) changed to lt IJV 1/13,  amaury, ET  - TTE 1/12: good LV function, moderate pulm HTN, endocarditis can not be ruled out, needs MATEO if strongly suspected   - appreciate ID consult, ct abx  - Amenable to repeat blood cultures     #endo  - hx RA on chronic prednisone, DM, hypothyroid  - Hydrocortisone 25 mg BID, do not titrate further down as this is nearly equivalent to home dose   - Synthroid 137mcg qhs through ngt   - Endo following, appreciate recs  - Lantus 22 units q hs  - Humalog 10units TID AC (prior to bolus feeds)    Lines:  -left IJ central line  -amaury feeding tube  -ET    #dispo  -SICU

## 2019-01-17 NOTE — PROGRESS NOTE ADULT - ASSESSMENT
63F w/ multiple respiratory comorbidities p/w SBO s/p ex-lap with findings of healthy bowel c/b evisceration of bowel s/p ex-lap with placement of retention sutures, now with worsening separation at skin edges requiring SICU level care including intubation for worsed respiratory status.    Plan  - pain control with dilaudid PCA gabapentin 300mg BID via NGT and gabapentin  - NPO w/ tube feeds  - Monitor GI fxn   - Monitor respiratory status  - Follow long term care plan  - DVT ppx: Lovenox  - Follow care plan per SICU team    KEELEY Colvin [id number]  B team surgery [id number]

## 2019-01-17 NOTE — PROGRESS NOTE ADULT - SUBJECTIVE AND OBJECTIVE BOX
Pulmonary Progress Note     Interval Events: No acute events overnight    SUBJECTIVE:    OBJECTIVE:  ICU Vital Signs Last 24 Hrs  T(C): 37.6 (17 Jan 2019 08:00), Max: 37.7 (16 Jan 2019 12:00)  T(F): 99.7 (17 Jan 2019 08:00), Max: 99.9 (16 Jan 2019 12:00)  HR: 104 (17 Jan 2019 09:00) (89 - 118)  BP: 130/78 (17 Jan 2019 09:00) (85/49 - 154/68)  BP(mean): 90 (17 Jan 2019 09:00) (49 - 97)  RR: 22 (17 Jan 2019 09:00) (17 - 29)  SpO2: 91% (17 Jan 2019 09:00) (89% - 96%)    Mode: CPAP with PS, FiO2: 40, PEEP: 8, PS: 12, MAP: 12    01-16 @ 07:01  -  01-17 @ 07:00  --------------------------------------------------------  IN: 1910 mL / OUT: 1900 mL / NET: 10 mL    01-17 @ 07:01  -  01-17 @ 09:34  --------------------------------------------------------  IN: 0 mL / OUT: 100 mL / NET: -100 mL    CAPILLARY BLOOD GLUCOSE  POCT Blood Glucose.: 115 mg/dL (17 Jan 2019 06:11)    PHYSICAL EXAM:  General: NAD, appears comfortable on vent  HEENT: NCAT, MOM, EOMI  Neck: Supple  Respiratory: coarse breath sounds bilaterally   Cardiovascular: RRR, S1/S2, (-) m/g/r, (-) pitting edema  Extremities: (-) clubbing/cyanosis/edema  Skin: c/d/i. NGT in place   Neurological: No new focal findings  Psychiatry: Appropriate    HOSPITAL MEDICATIONS:  MEDICATIONS  (STANDING):  ALBUTerol    90 MICROgram(s) HFA Inhaler 1 Puff(s) Inhalation every 6 hours  amLODIPine   Tablet 10 milliGRAM(s) Oral daily  buDESOnide 160 MICROgram(s)/formoterol 4.5 MICROgram(s) Inhaler 2 Puff(s) Inhalation two times a day  chlorhexidine 0.12% Liquid 15 milliLiter(s) Swish and Spit two times a day  chlorhexidine 4% Liquid 1 Application(s) Topical <User Schedule>  enoxaparin Injectable 40 milliGRAM(s) SubCutaneous daily  furosemide   Injectable 40 milliGRAM(s) IV Push daily  gabapentin   Solution 300 milliGRAM(s) Oral three times a day  hydrocortisone sodium succinate Injectable 25 milliGRAM(s) IV Push every 12 hours  HYDROmorphone PCA (1 mG/mL) 30 milliLiter(s) PCA Continuous PCA Continuous  influenza   Vaccine 0.5 milliLiter(s) IntraMuscular once  insulin glargine Injectable (LANTUS) 22 Unit(s) SubCutaneous at bedtime  insulin lispro (HumaLOG) corrective regimen sliding scale   SubCutaneous every 8 hours  insulin lispro Injectable (HumaLOG) 10 Unit(s) SubCutaneous every 8 hours  levothyroxine 137 MICROGram(s) Oral daily  metoprolol tartrate 25 milliGRAM(s) Enteral Tube two times a day  pantoprazole   Suspension 40 milliGRAM(s) Oral daily  piperacillin/tazobactam IVPB. 3.375 Gram(s) IV Intermittent every 8 hours  simvastatin 20 milliGRAM(s) Oral at bedtime  sodium chloride 3%  Inhalation 3 milliLiter(s) Inhalation two times a day  tiZANidine 1 milliGRAM(s) Oral every 6 hours  vancomycin  IVPB 1000 milliGRAM(s) IV Intermittent every 24 hours    MEDICATIONS  (PRN):  HYDROmorphone PCA (1 mG/mL) Rescue Clinician Bolus 0.5 milliGRAM(s) IV Push every 15 minutes PRN for Pain Scale GREATER THAN 6  LORazepam     Tablet 0.5 milliGRAM(s) Oral two times a day PRN Anxiety  naloxone Injectable 0.1 milliGRAM(s) IV Push every 3 minutes PRN For ANY of the following changes in patient status:  A. RR LESS THAN 10 breaths per minute, B. Oxygen saturation LESS THAN 90%, C. Sedation score of 6  ondansetron Injectable 4 milliGRAM(s) IV Push every 6 hours PRN Nausea    LABS:                        8.4    11.13 )-----------( 415      ( 17 Jan 2019 03:00 )             28.3     Hgb Trend: 8.4<--, 8.4<--, 7.6<--, 8.3<--, 7.1<--  01-17    146<H>  |  106  |  16  ----------------------------<  156<H>  4.0   |  27  |  0.85    Ca    8.9      17 Jan 2019 03:00  Phos  2.5     01-17  Mg     2.2     01-17    Creatinine Trend: 0.85<--, 1.01<--, 0.91<--, 1.12<--, 1.03<--, 1.06<--    RADIOLOGY:  [x] Reviewed and interpreted by me    PULMONARY FUNCTION TESTS: Pulmonary Progress Note     Interval Events: No acute events overnight    SUBJECTIVE: Intubated, nods y/n, states pain well controlled with PCA.     OBJECTIVE:  ICU Vital Signs Last 24 Hrs  T(C): 37.6 (17 Jan 2019 08:00), Max: 37.7 (16 Jan 2019 12:00)  T(F): 99.7 (17 Jan 2019 08:00), Max: 99.9 (16 Jan 2019 12:00)  HR: 104 (17 Jan 2019 09:00) (89 - 118)  BP: 130/78 (17 Jan 2019 09:00) (85/49 - 154/68)  BP(mean): 90 (17 Jan 2019 09:00) (49 - 97)  RR: 22 (17 Jan 2019 09:00) (17 - 29)  SpO2: 91% (17 Jan 2019 09:00) (89% - 96%)    Mode: CPAP with PS, FiO2: 40, PEEP: 8, PS: 10, MAP: 12    01-16 @ 07:01 - 01-17 @ 07:00  --------------------------------------------------------  IN: 1910 mL / OUT: 1900 mL / NET: 10 mL    01-17 @ 07:01  -  01-17 @ 09:34  --------------------------------------------------------  IN: 0 mL / OUT: 100 mL / NET: -100 mL    CAPILLARY BLOOD GLUCOSE  POCT Blood Glucose.: 115 mg/dL (17 Jan 2019 06:11)    PHYSICAL EXAM:  General: NAD, appears comfortable on vent  HEENT: NCAT, MOM, EOMI  Neck: Supple  Respiratory: coarse breath sounds bilaterally   Cardiovascular: RRR, S1/S2, (-) m/g/r, (-) pitting edema  Extremities: (-) clubbing/cyanosis/edema  Skin: c/d/i. NGT in place   Neurological: No new focal findings  Psychiatry: Appropriate    HOSPITAL MEDICATIONS:  MEDICATIONS  (STANDING):  ALBUTerol    90 MICROgram(s) HFA Inhaler 1 Puff(s) Inhalation every 6 hours  amLODIPine   Tablet 10 milliGRAM(s) Oral daily  buDESOnide 160 MICROgram(s)/formoterol 4.5 MICROgram(s) Inhaler 2 Puff(s) Inhalation two times a day  chlorhexidine 0.12% Liquid 15 milliLiter(s) Swish and Spit two times a day  chlorhexidine 4% Liquid 1 Application(s) Topical <User Schedule>  enoxaparin Injectable 40 milliGRAM(s) SubCutaneous daily  furosemide   Injectable 40 milliGRAM(s) IV Push daily  gabapentin   Solution 300 milliGRAM(s) Oral three times a day  hydrocortisone sodium succinate Injectable 25 milliGRAM(s) IV Push every 12 hours  HYDROmorphone PCA (1 mG/mL) 30 milliLiter(s) PCA Continuous PCA Continuous  influenza   Vaccine 0.5 milliLiter(s) IntraMuscular once  insulin glargine Injectable (LANTUS) 22 Unit(s) SubCutaneous at bedtime  insulin lispro (HumaLOG) corrective regimen sliding scale   SubCutaneous every 8 hours  insulin lispro Injectable (HumaLOG) 10 Unit(s) SubCutaneous every 8 hours  levothyroxine 137 MICROGram(s) Oral daily  metoprolol tartrate 25 milliGRAM(s) Enteral Tube two times a day  pantoprazole   Suspension 40 milliGRAM(s) Oral daily  piperacillin/tazobactam IVPB. 3.375 Gram(s) IV Intermittent every 8 hours  simvastatin 20 milliGRAM(s) Oral at bedtime  sodium chloride 3%  Inhalation 3 milliLiter(s) Inhalation two times a day  tiZANidine 1 milliGRAM(s) Oral every 6 hours  vancomycin  IVPB 1000 milliGRAM(s) IV Intermittent every 24 hours    MEDICATIONS  (PRN):  HYDROmorphone PCA (1 mG/mL) Rescue Clinician Bolus 0.5 milliGRAM(s) IV Push every 15 minutes PRN for Pain Scale GREATER THAN 6  LORazepam     Tablet 0.5 milliGRAM(s) Oral two times a day PRN Anxiety  naloxone Injectable 0.1 milliGRAM(s) IV Push every 3 minutes PRN For ANY of the following changes in patient status:  A. RR LESS THAN 10 breaths per minute, B. Oxygen saturation LESS THAN 90%, C. Sedation score of 6  ondansetron Injectable 4 milliGRAM(s) IV Push every 6 hours PRN Nausea    LABS:                        8.4    11.13 )-----------( 415      ( 17 Jan 2019 03:00 )             28.3     Hgb Trend: 8.4<--, 8.4<--, 7.6<--, 8.3<--, 7.1<--  01-17    146<H>  |  106  |  16  ----------------------------<  156<H>  4.0   |  27  |  0.85    Ca    8.9      17 Jan 2019 03:00  Phos  2.5     01-17  Mg     2.2     01-17    Creatinine Trend: 0.85<--, 1.01<--, 0.91<--, 1.12<--, 1.03<--, 1.06<--    RADIOLOGY:  [x] Reviewed and interpreted by me    PULMONARY FUNCTION TESTS:

## 2019-01-17 NOTE — PROGRESS NOTE ADULT - SUBJECTIVE AND OBJECTIVE BOX
SICU Daily Progress Note  =====================================================  Interval/Overnight Events: Pain control improved with PCA. Family requesting transfer.     63y Female with pmh COPD, DM, RA, hypothyroid, pulm HTN, CHRIS, HTN admitted on 12/15 for SBO, underwent exploratory laparotomy and adhesiolysis, no transition point was noted. pt transferred to SICU for intentional delayed extubation post op due to pulm comorbidities.   transferred to floors after extubation, eviscerated after having violent coughing episode, RTOR, returned to SICU for intentional delayed extubation. Pt transferred to RCU as developed viral multifocal pneumonia, returned for worsening respiratory distress, intubated in SICU and remains intubated till date. Currently course c/b E faecium bacteremia.     Allergies: No Known Allergies    MEDICATIONS:   --------------------------------------------------------------------------------------  Neurologic Medications  gabapentin   Solution 300 milliGRAM(s) Oral three times a day  HYDROmorphone PCA (1 mG/mL) 30 milliLiter(s) PCA Continuous PCA Continuous  HYDROmorphone PCA (1 mG/mL) Rescue Clinician Bolus 0.5 milliGRAM(s) IV Push every 15 minutes PRN for Pain Scale GREATER THAN 6  LORazepam     Tablet 0.5 milliGRAM(s) Oral two times a day PRN Anxiety  ondansetron Injectable 4 milliGRAM(s) IV Push every 6 hours PRN Nausea  tiZANidine 1 milliGRAM(s) Oral every 6 hours    Respiratory Medications  ALBUTerol    90 MICROgram(s) HFA Inhaler 1 Puff(s) Inhalation every 6 hours  buDESOnide 160 MICROgram(s)/formoterol 4.5 MICROgram(s) Inhaler 2 Puff(s) Inhalation two times a day  sodium chloride 3%  Inhalation 3 milliLiter(s) Inhalation two times a day    Cardiovascular Medications  amLODIPine   Tablet 10 milliGRAM(s) Oral daily  furosemide   Injectable 40 milliGRAM(s) IV Push daily  metoprolol tartrate 25 milliGRAM(s) Enteral Tube two times a day    Gastrointestinal Medications  pantoprazole   Suspension 40 milliGRAM(s) Oral daily    Genitourinary Medications    Hematologic/Oncologic Medications  enoxaparin Injectable 40 milliGRAM(s) SubCutaneous daily  influenza   Vaccine 0.5 milliLiter(s) IntraMuscular once    Antimicrobial/Immunologic Medications  piperacillin/tazobactam IVPB. 3.375 Gram(s) IV Intermittent every 8 hours  vancomycin  IVPB 1000 milliGRAM(s) IV Intermittent every 24 hours    Endocrine/Metabolic Medications  hydrocortisone sodium succinate Injectable 25 milliGRAM(s) IV Push every 12 hours  insulin glargine Injectable (LANTUS) 22 Unit(s) SubCutaneous at bedtime  insulin lispro (HumaLOG) corrective regimen sliding scale   SubCutaneous every 8 hours  insulin lispro Injectable (HumaLOG) 10 Unit(s) SubCutaneous every 8 hours  levothyroxine 137 MICROGram(s) Oral daily  simvastatin 20 milliGRAM(s) Oral at bedtime    Topical/Other Medications  chlorhexidine 0.12% Liquid 15 milliLiter(s) Swish and Spit two times a day  chlorhexidine 4% Liquid 1 Application(s) Topical <User Schedule>  naloxone Injectable 0.1 milliGRAM(s) IV Push every 3 minutes PRN For ANY of the following changes in patient status:  A. RR LESS THAN 10 breaths per minute, B. Oxygen saturation LESS THAN 90%, C. Sedation score of 6    --------------------------------------------------------------------------------------    VITAL SIGNS, INS/OUTS (last 24 hours):  --------------------------------------------------------------------------------------  ((Insert SICU Vitals/Is+Os here))***  --------------------------------------------------------------------------------------    EXAM  NEUROLOGY  RASS: 0 to +1/+2  Exam: Normal, NAD, alert, oriented, no focal deficits    HEENT  Exam: Normocephalic, atraumatic, EOMI.    RESPIRATORY  Exam: Lungs clear to auscultation, decreased air movement to bilateral bases   Mechanical Ventilation: Mode: CPAP with PS, FiO2: 50, PEEP: 8, MAP: 10, PIP: 22    CARDIOVASCULAR  Exam: S1, S2.  Regular rate and rhythm.     GI/NUTRITION  Exam: Soft, nontender, nondistended, incision gaping with fibrinous exudate, rectal pouch in situ for liquid stools  Current Diet:  TF, glucerna 400ml bolus feeds TID    VASCULAR  Exam: Extremities warm, pink, well-perfused.    MUSCULOSKELETAL  Exam: All extremities moving spontaneously without limitations.     METABOLIC/FLUIDS/ELECTROLYTES    HEMATOLOGIC  [x] VTE Prophylaxis: enoxaparin Injectable 40 milliGRAM(s) SubCutaneous daily    Transfusions:	[] PRBC	[] Platelets		[] FFP	[] Cryoprecipitate    INFECTIOUS DISEASE  Antimicrobials/Immunologic Medications:  influenza   Vaccine 0.5 milliLiter(s) IntraMuscular once  piperacillin/tazobactam IVPB. 3.375 Gram(s) IV Intermittent every 8 hours  vancomycin  IVPB 1000 milliGRAM(s) IV Intermittent every 24 hours    Tubes/Lines/Drains   [x] Peripheral IV  [x] Central Venous Line     	[] R	[x] L	[x] IJ	[] Fem	[] SC	Date Placed:   [] Arterial Line		[] R	[] L	[] Fem	[] Rad	[] Ax	Date Placed:   [] PICC		[] Midline		[] Mediport  [] Urinary Catheter		Date Placed:   [x] Necessity of urinary, arterial, and venous catheters discussed    LABS  --------------------------------------------------------------------------------------  ((Insert [facility] Labs here))***  --------------------------------------------------------------------------------------    OTHER LABORATORY:     IMAGING STUDIES:   CXR: SICU Daily Progress Note  =====================================================  Interval/Overnight Events: Pain control improved with PCA. Family requesting transfer, pending preauthorization from [facility]    63y Female with pmh COPD, DM, RA, hypothyroid, pulm HTN, CHRIS, HTN admitted on 12/15 for SBO, underwent exploratory laparotomy and adhesiolysis, no transition point was noted. pt transferred to SICU for intentional delayed extubation post op due to pulm comorbidities.   transferred to floors after extubation, eviscerated after having violent coughing episode, RTOR, returned to SICU for intentional delayed extubation. Pt transferred to RCU as developed viral multifocal pneumonia, returned for worsening respiratory distress, intubated in SICU and remains intubated till date. Currently course c/b E faecium bacteremia.     Allergies: No Known Allergies    MEDICATIONS:   --------------------------------------------------------------------------------------  Neurologic Medications  gabapentin   Solution 300 milliGRAM(s) Oral three times a day  HYDROmorphone PCA (1 mG/mL) 30 milliLiter(s) PCA Continuous PCA Continuous  HYDROmorphone PCA (1 mG/mL) Rescue Clinician Bolus 0.5 milliGRAM(s) IV Push every 15 minutes PRN for Pain Scale GREATER THAN 6  LORazepam     Tablet 0.5 milliGRAM(s) Oral two times a day PRN Anxiety  ondansetron Injectable 4 milliGRAM(s) IV Push every 6 hours PRN Nausea  tiZANidine 1 milliGRAM(s) Oral every 6 hours    Respiratory Medications  ALBUTerol    90 MICROgram(s) HFA Inhaler 1 Puff(s) Inhalation every 6 hours  buDESOnide 160 MICROgram(s)/formoterol 4.5 MICROgram(s) Inhaler 2 Puff(s) Inhalation two times a day    Cardiovascular Medications  amLODIPine   Tablet 10 milliGRAM(s) Oral daily  furosemide   Injectable 40 milliGRAM(s) IV Push daily  metoprolol tartrate 25 milliGRAM(s) Enteral Tube two times a day    Gastrointestinal Medications  pantoprazole   Suspension 40 milliGRAM(s) Oral daily    Genitourinary Medications    Hematologic/Oncologic Medications  enoxaparin Injectable 40 milliGRAM(s) SubCutaneous daily    Antimicrobial/Immunologic Medications  piperacillin/tazobactam IVPB. 3.375 Gram(s) IV Intermittent every 8 hours  vancomycin  IVPB 1000 milliGRAM(s) IV Intermittent every 24 hours  influenza   Vaccine 0.5 milliLiter(s) IntraMuscular once    Endocrine/Metabolic Medications  hydrocortisone sodium succinate Injectable 25 milliGRAM(s) IV Push every 12 hours  levothyroxine 137 MICROGram(s) Oral daily  simvastatin 20 milliGRAM(s) Oral at bedtime  insulin glargine Injectable (LANTUS) 22 Unit(s) SubCutaneous at bedtime  insulin lispro (HumaLOG) corrective regimen sliding scale   SubCutaneous every 8 hours  insulin lispro Injectable (HumaLOG) 10 Unit(s) SubCutaneous every 8 hours    Topical/Other Medications  chlorhexidine 0.12% Liquid 15 milliLiter(s) Swish and Spit two times a day  chlorhexidine 4% Liquid 1 Application(s) Topical <User Schedule>  naloxone Injectable 0.1 milliGRAM(s) IV Push every 3 minutes PRN For ANY of the following changes in patient status:  A. RR LESS THAN 10 breaths per minute, B. Oxygen saturation LESS THAN 90%, C. Sedation score of 6    ICU Vital Signs Last 24 Hrs  T(C): 37.6 (17 Jan 2019 04:00), Max: 37.7 (16 Jan 2019 12:00)  T(F): 99.7 (17 Jan 2019 04:00), Max: 99.9 (16 Jan 2019 12:00)  HR: 94 (17 Jan 2019 07:00) (89 - 118)  BP: 119/81 (17 Jan 2019 06:00) (85/49 - 154/68)  BP(mean): 85 (17 Jan 2019 06:00) (49 - 97)  ABP: --  ABP(mean): --  RR: 19 (17 Jan 2019 07:00) (17 - 29)  SpO2: 90% (17 Jan 2019 07:00) (89% - 96%)    EXAM  NEUROLOGY  RASS: 0 to +1/+2  Exam: Normal, NAD, alert, oriented, no focal deficits  plan: ct PCA and gabapentin, ativan prn     HEENT  Exam: Normocephalic, atraumatic, EOMI.    RESPIRATORY  Exam: Lungs clear to auscultation, decreased air movement to bilateral bases   Mechanical Ventilation: Mode: CPAP with PS, FiO2: 50, PEEP: 8, MAP: 10, PIP: 22  on EtCO2 and SPO2 monitoring     CARDIOVASCULAR  Exam: S1, S2.  Regular rate and rhythm.    GI/NUTRITION  Exam: Soft, nontender, nondistended, incision gaping with fibrinous exudate, rectal pouch in situ    Diet, NPO with Tube Feed:   Tube Feeding Modality: Nasogastric  Glucerna 1.2 Dewey (GLUCERNARTH)  Total Volume for 24 Hours (mL): 1200  Bolus   Total Volume of Bolus (mL):  400  Bolus Feed Rate (mL per Hour): 400   Bolus Feed Duration (in Hours): 1  Tube Feed Frequency: Every 8 hours   Tube Feed Start Time: 20:00  No Carb Prosource (1pkg = 15gms Protein)     Qty per Day:  2 (01-16-19 @ 15:45)    VASCULAR  Exam: Extremities warm, pink, well-perfused.    MUSCULOSKELETAL  Exam: All extremities moving spontaneously without limitations.     RENAL/FLUIDS/ELECTROLYTES  I&O's Detail    16 Jan 2019 07:01  -  17 Jan 2019 07:00  --------------------------------------------------------  IN:    Enteral Tube Flush: 80 mL    Glucerna: 1280 mL    IV PiggyBack: 550 mL  Total IN: 1910 mL    OUT:    Rectal Tube: 500 mL    Voided: 1400 mL  Total OUT: 1900 mL    Total NET: 10 mL    01-17    146<H>  |  106  |  16  ----------------------------<  156<H>  4.0   |  27  |  0.85    Ca    8.9      17 Jan 2019 03:00  Phos  2.5     01-17  Mg     2.2     01-17    METABOLIC/ENDO  CAPILLARY BLOOD GLUCOSE      POCT Blood Glucose.: 115 mg/dL (17 Jan 2019 06:11)  POCT Blood Glucose.: 260 mg/dL (16 Jan 2019 23:31)  POCT Blood Glucose.: 125 mg/dL (16 Jan 2019 14:08)  POCT Blood Glucose.: 80 mg/dL (16 Jan 2019 07:16)      HEMATOLOGIC                        8.4    11.13 )-----------( 415      ( 17 Jan 2019 03:00 )             28.3     [x] VTE Prophylaxis: enoxaparin Injectable 40 milliGRAM(s) SubCutaneous daily    Transfusions:	[] PRBC	[] Platelets		[] FFP	[] Cryoprecipitate    INFECTIOUS DISEASE  afebrile in last 24 hrs,    WBC Count: 11.13 K/uL (01-17 @ 03:00)  WBC Count: 10.55 K/uL (01-16 @ 02:10)  WBC Count: 8.30 K/uL (01-15 @ 02:29)  WBC Count: 11.25 K/uL (01-14 @ 09:04)  WBC Count: 8.68 K/uL (01-14 @ 03:45)    Antimicrobials/Immunologic Medications:  influenza   Vaccine 0.5 milliLiter(s) IntraMuscular once  piperacillin/tazobactam IVPB. 3.375 Gram(s) IV Intermittent every 8 hours  vancomycin  IVPB 1000 milliGRAM(s) IV Intermittent every 24 hours    Tubes/Lines/Drains   [] Peripheral IV  [x] Central Venous Line     	[] R	[x] L	[x] IJ	[] Fem	[] SC	Date Placed:   [] Arterial Line		[] R	[] L	[] Fem	[] Rad	[] Ax	Date Placed:   [] PICC		[] Midline		[] Mediport  [] Urinary Catheter		Date Placed:   [x] Necessity of urinary, arterial, and venous catheters discussed    OTHER LABORATORY:     IMAGING STUDIES:   CXR:  < from: Xray Chest 1 View- PORTABLE-Routine (01.16.19 @ 00:53) >  IMPRESSION:  Follow-up with mild improvement of left lung opacity   although rotation on the radiograph may account for some of this change   and considerable opacification of the right lung and left lung base may   indicate multifocal pneumonia.    < end of copied text > SICU Daily Progress Note  =====================================================  Interval/Overnight Events: Pain control improved with PCA, gabapentin frequency  increased to tid from bid, tizanidine added.   Family requesting transfer, pending preauthorization from [facility].    HPI  63y Female with pmh COPD, DM, RA, hypothyroid, pulm HTN, CHRIS, HTN admitted on 12/15 for SBO, underwent exploratory laparotomy and adhesiolysis, no transition point was noted. pt transferred to SICU for intentional delayed extubation post op due to pulm comorbidities.   transferred to floors after extubation, eviscerated after having violent coughing episode, RTOR, returned to SICU for intentional delayed extubation. Pt transferred to RCU as developed viral multifocal pneumonia, returned 1/6 for worsening respiratory distress, intubated in SICU and remains intubated till date. Currently course c/b E faecium bacteremia.     Allergies: No Known Allergies      MEDICATIONS:   --------------------------------------------------------------------------------------  Neurologic Medications  gabapentin   Solution 300 milliGRAM(s) Oral three times a day  HYDROmorphone PCA (1 mG/mL) 30 milliLiter(s) PCA Continuous PCA Continuous  HYDROmorphone PCA (1 mG/mL) Rescue Clinician Bolus 0.5 milliGRAM(s) IV Push every 15 minutes PRN for Pain Scale GREATER THAN 6  LORazepam     Tablet 0.5 milliGRAM(s) Oral two times a day PRN Anxiety  ondansetron Injectable 4 milliGRAM(s) IV Push every 6 hours PRN Nausea  tiZANidine 1 milliGRAM(s) Oral every 6 hours    Respiratory Medications  ALBUTerol    90 MICROgram(s) HFA Inhaler 1 Puff(s) Inhalation every 6 hours  buDESOnide 160 MICROgram(s)/formoterol 4.5 MICROgram(s) Inhaler 2 Puff(s) Inhalation two times a day    Cardiovascular Medications  amLODIPine   Tablet 10 milliGRAM(s) Oral daily  furosemide   Injectable 40 milliGRAM(s) IV Push daily  metoprolol tartrate 25 milliGRAM(s) Enteral Tube two times a day    Gastrointestinal Medications  pantoprazole   Suspension 40 milliGRAM(s) Oral daily    Genitourinary Medications    Hematologic/Oncologic Medications  enoxaparin Injectable 40 milliGRAM(s) SubCutaneous daily    Antimicrobial/Immunologic Medications  piperacillin/tazobactam IVPB. 3.375 Gram(s) IV Intermittent every 8 hours  vancomycin  IVPB 1000 milliGRAM(s) IV Intermittent every 24 hours  influenza   Vaccine 0.5 milliLiter(s) IntraMuscular once    Endocrine/Metabolic Medications  hydrocortisone sodium succinate Injectable 25 milliGRAM(s) IV Push every 12 hours  levothyroxine 137 MICROGram(s) Oral daily  simvastatin 20 milliGRAM(s) Oral at bedtime  insulin glargine Injectable (LANTUS) 22 Unit(s) SubCutaneous at bedtime  insulin lispro (HumaLOG) corrective regimen sliding scale   SubCutaneous every 8 hours  insulin lispro Injectable (HumaLOG) 10 Unit(s) SubCutaneous every 8 hours    Topical/Other Medications  chlorhexidine 0.12% Liquid 15 milliLiter(s) Swish and Spit two times a day  chlorhexidine 4% Liquid 1 Application(s) Topical <User Schedule>  naloxone Injectable 0.1 milliGRAM(s) IV Push every 3 minutes PRN For ANY of the following changes in patient status:  A. RR LESS THAN 10 breaths per minute, B. Oxygen saturation LESS THAN 90%, C. Sedation score of 6    ICU Vital Signs Last 24 Hrs  T(C): 37.6 (17 Jan 2019 04:00), Max: 37.7 (16 Jan 2019 12:00)  T(F): 99.7 (17 Jan 2019 04:00), Max: 99.9 (16 Jan 2019 12:00)  HR: 94 (17 Jan 2019 07:00) (89 - 118)  BP: 119/81 (17 Jan 2019 06:00) (85/49 - 154/68)  BP(mean): 85 (17 Jan 2019 06:00) (49 - 97)  ABP: --  ABP(mean): --  RR: 19 (17 Jan 2019 07:00) (17 - 29)  SpO2: 90% (17 Jan 2019 07:00) (89% - 96%)    EXAM  NEUROLOGY  RASS: 0 to +1/+2  Exam: Normal, NAD, alert, oriented, no focal deficits  plan: ct PCA and gabapentin, ativan prn     HEENT  Exam: Normocephalic, atraumatic, EOMI.    RESPIRATORY  Exam: Lungs clear to auscultation, decreased air movement to bilateral bases   Mechanical Ventilation: Mode: CPAP with PS, FiO2: 50, PEEP: 8, MAP: 10, PIP: 22  on EtCO2 and SPO2 monitoring     CARDIOVASCULAR  Exam: S1, S2.  Regular rate and rhythm.    GI/NUTRITION  Exam: Soft, nontender, nondistended, incision gaping with fibrinous exudate, rectal pouch in situ    Diet, NPO with Tube Feed:   Tube Feeding Modality: Nasogastric  Glucerna 1.2 Dewey (GLUCERNARTH)  Total Volume for 24 Hours (mL): 1200  Bolus   Total Volume of Bolus (mL):  400  Bolus Feed Rate (mL per Hour): 400   Bolus Feed Duration (in Hours): 1  Tube Feed Frequency: Every 8 hours   Tube Feed Start Time: 20:00  No Carb Prosource (1pkg = 15gms Protein)     Qty per Day:  2 (01-16-19 @ 15:45)    VASCULAR  Exam: Extremities warm, pink, well-perfused.    MUSCULOSKELETAL  Exam: All extremities moving spontaneously without limitations.     RENAL/FLUIDS/ELECTROLYTES  I&O's Detail    16 Jan 2019 07:01  -  17 Jan 2019 07:00  --------------------------------------------------------  IN:    Enteral Tube Flush: 80 mL    Glucerna: 1280 mL    IV PiggyBack: 550 mL  Total IN: 1910 mL    OUT:    Rectal Tube: 500 mL    Voided: 1400 mL  Total OUT: 1900 mL    Total NET: 10 mL    01-17    146<H>  |  106  |  16  ----------------------------<  156<H>  4.0   |  27  |  0.85    Ca    8.9      17 Jan 2019 03:00  Phos  2.5     01-17  Mg     2.2     01-17    METABOLIC/ENDO  CAPILLARY BLOOD GLUCOSE      POCT Blood Glucose.: 115 mg/dL (17 Jan 2019 06:11)  POCT Blood Glucose.: 260 mg/dL (16 Jan 2019 23:31)  POCT Blood Glucose.: 125 mg/dL (16 Jan 2019 14:08)  POCT Blood Glucose.: 80 mg/dL (16 Jan 2019 07:16)      HEMATOLOGIC                        8.4    11.13 )-----------( 415      ( 17 Jan 2019 03:00 )             28.3     [x] VTE Prophylaxis: enoxaparin Injectable 40 milliGRAM(s) SubCutaneous daily    Transfusions:	[] PRBC	[] Platelets		[] FFP	[] Cryoprecipitate    INFECTIOUS DISEASE  afebrile in last 24 hrs,    WBC Count: 11.13 K/uL (01-17 @ 03:00)  WBC Count: 10.55 K/uL (01-16 @ 02:10)  WBC Count: 8.30 K/uL (01-15 @ 02:29)  WBC Count: 11.25 K/uL (01-14 @ 09:04)  WBC Count: 8.68 K/uL (01-14 @ 03:45)    Antimicrobials/Immunologic Medications:  influenza   Vaccine 0.5 milliLiter(s) IntraMuscular once  piperacillin/tazobactam IVPB. 3.375 Gram(s) IV Intermittent every 8 hours  vancomycin  IVPB 1000 milliGRAM(s) IV Intermittent every 24 hours    Tubes/Lines/Drains   [] Peripheral IV  [x] Central Venous Line     	[] R	[x] L	[x] IJ	[] Fem	[] SC	Date Placed:   [] Arterial Line		[] R	[] L	[] Fem	[] Rad	[] Ax	Date Placed:   [] PICC		[] Midline		[] Mediport  [] Urinary Catheter		Date Placed:   [x] Necessity of urinary, arterial, and venous catheters discussed    OTHER LABORATORY:     IMAGING STUDIES:   CXR:  < from: Xray Chest 1 View- PORTABLE-Routine (01.16.19 @ 00:53) >  IMPRESSION:  Follow-up with mild improvement of left lung opacity   although rotation on the radiograph may account for some of this change   and considerable opacification of the right lung and left lung base may   indicate multifocal pneumonia.    < end of copied text >

## 2019-01-17 NOTE — PROGRESS NOTE ADULT - ATTENDING COMMENTS
Complicated hospital course with frequent reintubations due to recurrent respiratory  failure secondary to different and multiple etiologies.  PST trail, plan for transfer to MICU for trial of extubation when bed available. If and once approved for transfer to [facility], patient will be transferred there for further care including extubation/possible tracheostomy.

## 2019-01-18 LAB
ANION GAP SERPL CALC-SCNC: 15 MMO/L — HIGH (ref 7–14)
BASOPHILS # BLD AUTO: 0.07 K/UL — SIGNIFICANT CHANGE UP (ref 0–0.2)
BASOPHILS NFR BLD AUTO: 0.5 % — SIGNIFICANT CHANGE UP (ref 0–2)
BUN SERPL-MCNC: 13 MG/DL — SIGNIFICANT CHANGE UP (ref 7–23)
CALCIUM SERPL-MCNC: 9.3 MG/DL — SIGNIFICANT CHANGE UP (ref 8.4–10.5)
CHLORIDE SERPL-SCNC: 105 MMOL/L — SIGNIFICANT CHANGE UP (ref 98–107)
CO2 SERPL-SCNC: 27 MMOL/L — SIGNIFICANT CHANGE UP (ref 22–31)
CREAT SERPL-MCNC: 0.82 MG/DL — SIGNIFICANT CHANGE UP (ref 0.5–1.3)
EOSINOPHIL # BLD AUTO: 0.13 K/UL — SIGNIFICANT CHANGE UP (ref 0–0.5)
EOSINOPHIL NFR BLD AUTO: 0.9 % — SIGNIFICANT CHANGE UP (ref 0–6)
GLUCOSE SERPL-MCNC: 177 MG/DL — HIGH (ref 70–99)
HCT VFR BLD CALC: 30.7 % — LOW (ref 34.5–45)
HGB BLD-MCNC: 9 G/DL — LOW (ref 11.5–15.5)
IMM GRANULOCYTES NFR BLD AUTO: 2.4 % — HIGH (ref 0–1.5)
LYMPHOCYTES # BLD AUTO: 23 % — SIGNIFICANT CHANGE UP (ref 13–44)
LYMPHOCYTES # BLD AUTO: 3.17 K/UL — SIGNIFICANT CHANGE UP (ref 1–3.3)
MAGNESIUM SERPL-MCNC: 2.2 MG/DL — SIGNIFICANT CHANGE UP (ref 1.6–2.6)
MCHC RBC-ENTMCNC: 29.3 % — LOW (ref 32–36)
MCHC RBC-ENTMCNC: 30 PG — SIGNIFICANT CHANGE UP (ref 27–34)
MCV RBC AUTO: 102.3 FL — HIGH (ref 80–100)
MONOCYTES # BLD AUTO: 1.56 K/UL — HIGH (ref 0–0.9)
MONOCYTES NFR BLD AUTO: 11.3 % — SIGNIFICANT CHANGE UP (ref 2–14)
NEUTROPHILS # BLD AUTO: 8.53 K/UL — HIGH (ref 1.8–7.4)
NEUTROPHILS NFR BLD AUTO: 61.9 % — SIGNIFICANT CHANGE UP (ref 43–77)
NRBC # FLD: 0.3 K/UL — LOW (ref 25–125)
NRBC FLD-RTO: 2.2 — SIGNIFICANT CHANGE UP
PHOSPHATE SERPL-MCNC: 3 MG/DL — SIGNIFICANT CHANGE UP (ref 2.5–4.5)
PLATELET # BLD AUTO: 472 K/UL — HIGH (ref 150–400)
PMV BLD: 9.6 FL — SIGNIFICANT CHANGE UP (ref 7–13)
POTASSIUM SERPL-MCNC: 4.1 MMOL/L — SIGNIFICANT CHANGE UP (ref 3.5–5.3)
POTASSIUM SERPL-SCNC: 4.1 MMOL/L — SIGNIFICANT CHANGE UP (ref 3.5–5.3)
RBC # BLD: 3 M/UL — LOW (ref 3.8–5.2)
RBC # FLD: 17 % — HIGH (ref 10.3–14.5)
SODIUM SERPL-SCNC: 147 MMOL/L — HIGH (ref 135–145)
SPECIMEN SOURCE: SIGNIFICANT CHANGE UP
WBC # BLD: 13.79 K/UL — HIGH (ref 3.8–10.5)
WBC # FLD AUTO: 13.79 K/UL — HIGH (ref 3.8–10.5)

## 2019-01-18 PROCEDURE — 99291 CRITICAL CARE FIRST HOUR: CPT

## 2019-01-18 PROCEDURE — 99232 SBSQ HOSP IP/OBS MODERATE 35: CPT

## 2019-01-18 PROCEDURE — 71045 X-RAY EXAM CHEST 1 VIEW: CPT | Mod: 26

## 2019-01-18 RX ORDER — COLLAGENASE CLOSTRIDIUM HIST. 250 UNIT/G
1 OINTMENT (GRAM) TOPICAL DAILY
Qty: 0 | Refills: 0 | Status: DISCONTINUED | OUTPATIENT
Start: 2019-01-18 | End: 2019-03-20

## 2019-01-18 RX ORDER — ACETAMINOPHEN 500 MG
1000 TABLET ORAL ONCE
Qty: 0 | Refills: 0 | Status: COMPLETED | OUTPATIENT
Start: 2019-01-18 | End: 2019-01-18

## 2019-01-18 RX ADMIN — Medication 0.5 MILLIGRAM(S): at 06:01

## 2019-01-18 RX ADMIN — Medication 1: at 06:04

## 2019-01-18 RX ADMIN — Medication 25 MILLIGRAM(S): at 17:47

## 2019-01-18 RX ADMIN — Medication 10 UNIT(S): at 22:57

## 2019-01-18 RX ADMIN — BUDESONIDE AND FORMOTEROL FUMARATE DIHYDRATE 2 PUFF(S): 160; 4.5 AEROSOL RESPIRATORY (INHALATION) at 22:07

## 2019-01-18 RX ADMIN — GABAPENTIN 300 MILLIGRAM(S): 400 CAPSULE ORAL at 13:08

## 2019-01-18 RX ADMIN — SODIUM CHLORIDE 3 MILLILITER(S): 9 INJECTION INTRAMUSCULAR; INTRAVENOUS; SUBCUTANEOUS at 09:56

## 2019-01-18 RX ADMIN — Medication 250 MILLIGRAM(S): at 06:01

## 2019-01-18 RX ADMIN — PIPERACILLIN AND TAZOBACTAM 25 GRAM(S): 4; .5 INJECTION, POWDER, LYOPHILIZED, FOR SOLUTION INTRAVENOUS at 17:47

## 2019-01-18 RX ADMIN — Medication 10 UNIT(S): at 06:04

## 2019-01-18 RX ADMIN — HYDROMORPHONE HYDROCHLORIDE 30 MILLILITER(S): 2 INJECTION INTRAMUSCULAR; INTRAVENOUS; SUBCUTANEOUS at 19:02

## 2019-01-18 RX ADMIN — Medication 400 MILLIGRAM(S): at 01:10

## 2019-01-18 RX ADMIN — CHLORHEXIDINE GLUCONATE 1 APPLICATION(S): 213 SOLUTION TOPICAL at 15:09

## 2019-01-18 RX ADMIN — Medication 10 UNIT(S): at 13:09

## 2019-01-18 RX ADMIN — Medication 137 MICROGRAM(S): at 05:30

## 2019-01-18 RX ADMIN — CHLORHEXIDINE GLUCONATE 15 MILLILITER(S): 213 SOLUTION TOPICAL at 17:48

## 2019-01-18 RX ADMIN — AMLODIPINE BESYLATE 10 MILLIGRAM(S): 2.5 TABLET ORAL at 06:01

## 2019-01-18 RX ADMIN — Medication 25 MILLIGRAM(S): at 06:06

## 2019-01-18 RX ADMIN — HYDROMORPHONE HYDROCHLORIDE 30 MILLILITER(S): 2 INJECTION INTRAMUSCULAR; INTRAVENOUS; SUBCUTANEOUS at 07:28

## 2019-01-18 RX ADMIN — BUDESONIDE AND FORMOTEROL FUMARATE DIHYDRATE 2 PUFF(S): 160; 4.5 AEROSOL RESPIRATORY (INHALATION) at 09:56

## 2019-01-18 RX ADMIN — Medication 1000 MILLIGRAM(S): at 01:30

## 2019-01-18 RX ADMIN — INSULIN GLARGINE 22 UNIT(S): 100 INJECTION, SOLUTION SUBCUTANEOUS at 22:56

## 2019-01-18 RX ADMIN — TIZANIDINE 1 MILLIGRAM(S): 4 TABLET ORAL at 06:03

## 2019-01-18 RX ADMIN — HYDROMORPHONE HYDROCHLORIDE 30 MILLILITER(S): 2 INJECTION INTRAMUSCULAR; INTRAVENOUS; SUBCUTANEOUS at 02:47

## 2019-01-18 RX ADMIN — HYDROMORPHONE HYDROCHLORIDE 30 MILLILITER(S): 2 INJECTION INTRAMUSCULAR; INTRAVENOUS; SUBCUTANEOUS at 15:27

## 2019-01-18 RX ADMIN — TIZANIDINE 1 MILLIGRAM(S): 4 TABLET ORAL at 13:09

## 2019-01-18 RX ADMIN — SIMVASTATIN 20 MILLIGRAM(S): 20 TABLET, FILM COATED ORAL at 22:56

## 2019-01-18 RX ADMIN — Medication 40 MILLIGRAM(S): at 06:01

## 2019-01-18 RX ADMIN — Medication 25 MILLIGRAM(S): at 06:01

## 2019-01-18 RX ADMIN — Medication 2: at 22:57

## 2019-01-18 RX ADMIN — PIPERACILLIN AND TAZOBACTAM 25 GRAM(S): 4; .5 INJECTION, POWDER, LYOPHILIZED, FOR SOLUTION INTRAVENOUS at 11:19

## 2019-01-18 RX ADMIN — SODIUM CHLORIDE 3 MILLILITER(S): 9 INJECTION INTRAMUSCULAR; INTRAVENOUS; SUBCUTANEOUS at 22:11

## 2019-01-18 RX ADMIN — ALBUTEROL 1 PUFF(S): 90 AEROSOL, METERED ORAL at 09:56

## 2019-01-18 RX ADMIN — ALBUTEROL 1 PUFF(S): 90 AEROSOL, METERED ORAL at 22:05

## 2019-01-18 RX ADMIN — Medication 1: at 13:08

## 2019-01-18 RX ADMIN — PANTOPRAZOLE SODIUM 40 MILLIGRAM(S): 20 TABLET, DELAYED RELEASE ORAL at 11:27

## 2019-01-18 RX ADMIN — GABAPENTIN 300 MILLIGRAM(S): 400 CAPSULE ORAL at 06:03

## 2019-01-18 RX ADMIN — Medication 0.5 MILLIGRAM(S): at 19:52

## 2019-01-18 RX ADMIN — ALBUTEROL 1 PUFF(S): 90 AEROSOL, METERED ORAL at 16:29

## 2019-01-18 RX ADMIN — GABAPENTIN 300 MILLIGRAM(S): 400 CAPSULE ORAL at 22:56

## 2019-01-18 RX ADMIN — CHLORHEXIDINE GLUCONATE 15 MILLILITER(S): 213 SOLUTION TOPICAL at 06:04

## 2019-01-18 RX ADMIN — ALBUTEROL 1 PUFF(S): 90 AEROSOL, METERED ORAL at 05:14

## 2019-01-18 RX ADMIN — PIPERACILLIN AND TAZOBACTAM 25 GRAM(S): 4; .5 INJECTION, POWDER, LYOPHILIZED, FOR SOLUTION INTRAVENOUS at 02:48

## 2019-01-18 NOTE — PROGRESS NOTE ADULT - ASSESSMENT
63y Female hx of COPD presented initially with SBO, underwent ex lap 12/15 closed with 4 retention sutures, evicerated on 12/30 and closed with 6 more retention sutures, c/b multifocal pneumonia, developed hypercapnic respiratory failure requiring intubation 1/6, S/p bronch 1/9/18, improving and tolerating CPAP, still intubated, course further c/b Enterococci bacteremia no on vanco and zosyn, improving     #neuro  Awake, alert, follows commands, RASS 0 to +1  - on ativan 0.5 mg PO PRN for discomfort/ anxiety  - Pain control w/ PCA dilaudid and tizanidine, pt s/p ex lap with 10 retention sutures  - Appreciate acute pain service input   - C/w gabapentin 300mg tid    #resp  Hx COPD and moderate pulm HTN, former smoker, course c/b viral pneumonia, s/p bronch 1/9,  - Intubated, on PS/CPAP mode, tolerating well FIO2 40%  - C/w inhalers, pulmozyme, pulmonary toilet   - OOB to chair   -plan for extubation trial but family requested transfer out to [facility] hence delayed,   - appreciate pulm recs, Dr Lisker following, if not transferred to [facility], pt will be transferred to MICU in evening today    #CV  HD stable  - Continue amlodipine 10mg  - Metoprolol 25mg po BID   - Lasix 40 mg od IV    #GI  s/p 2*ex-lap, 10 retention sutures in situ, gaping wound covered in fibrinous exudate   - Daily wound check and dressing by RN  - c/w protonix daily, home med  - Patient on bolus tube feeds, tolerating well    #renal  - BMP and I&O monitoring as routine   - iraheta removed, patient spontaneously voiding   - Repletion of electrolytes PRN    #heme  - H/H stable   - VTE ppx w/ Lovenox and IPCs  - b/l LE venous duplex 1/14 no s/o DVT, calf veins not visualized well    #ID  no fever in 24 hrs, WBC counts improving   - blood cultures 1/12 growing Enterococcus faecium, wound culture 1/12 growing gpc and gpr,  - lines: iraheta removed, rt IJV (1/6) changed to lt IJV 1/13,  ET 1/6  - TTE 1/12: good LV function, moderate pulm HTN, endocarditis can not be ruled out, needs MATEO if strongly suspected   - appreciate ID consult, ct abx  - repeat bcx 1/17, pending result     #endo  - hx RA on chronic prednisone, DM, hypothyroid  - Hydrocortisone 25 mg BID, do not titrate further down as this is nearly equivalent to home dose   - Synthroid 137mcg qhs through ngt   - Lantus 22 units q hs  - Humalog 10units TID AC (prior to bolus feeds) along with low dose corrective scale to cover  - Endo following, appreciate recs    Lines:  -left IJ central line  -amaury feeding tube  -ET    #dispo  -SICU, MICU/ [facility]      Critical Care Diagnoses:

## 2019-01-18 NOTE — PROGRESS NOTE ADULT - SUBJECTIVE AND OBJECTIVE BOX
Anesthesia Pain Management Service    SUBJECTIVE: Patient is doing well with IV PCA and no significant problems reported.    Pain Scale Score	At rest: CPOT 0/10	With Activity: ___ 	[X ] Refer to charted pain scores    THERAPY:    [ ] IV PCA Morphine		[ ] 5 mg/mL	[ ] 1 mg/mL  [X ] IV PCA Hydromorphone	[ ] 5 mg/mL	[X ] 1 mg/mL  [ ] IV PCA Fentanyl		[ ] 50 micrograms/mL    Demand dose __0.2_ lockout __6_ (minutes) Continuous Rate _0__ Total: _20.1__  mg used (in past 24 hours)      MEDICATIONS  (STANDING):  ALBUTerol    90 MICROgram(s) HFA Inhaler 1 Puff(s) Inhalation every 6 hours  amLODIPine   Tablet 10 milliGRAM(s) Oral daily  buDESOnide 160 MICROgram(s)/formoterol 4.5 MICROgram(s) Inhaler 2 Puff(s) Inhalation two times a day  chlorhexidine 0.12% Liquid 15 milliLiter(s) Swish and Spit two times a day  chlorhexidine 4% Liquid 1 Application(s) Topical <User Schedule>  enoxaparin Injectable 40 milliGRAM(s) SubCutaneous daily  furosemide   Injectable 40 milliGRAM(s) IV Push daily  gabapentin   Solution 300 milliGRAM(s) Oral three times a day  hydrocortisone sodium succinate Injectable 25 milliGRAM(s) IV Push every 12 hours  HYDROmorphone PCA (1 mG/mL) 30 milliLiter(s) PCA Continuous PCA Continuous  influenza   Vaccine 0.5 milliLiter(s) IntraMuscular once  insulin glargine Injectable (LANTUS) 22 Unit(s) SubCutaneous at bedtime  insulin lispro (HumaLOG) corrective regimen sliding scale   SubCutaneous every 8 hours  insulin lispro Injectable (HumaLOG) 10 Unit(s) SubCutaneous every 8 hours  levothyroxine 137 MICROGram(s) Oral daily  metoprolol tartrate 25 milliGRAM(s) Enteral Tube two times a day  pantoprazole   Suspension 40 milliGRAM(s) Oral daily  piperacillin/tazobactam IVPB. 3.375 Gram(s) IV Intermittent every 8 hours  simvastatin 20 milliGRAM(s) Oral at bedtime  sodium chloride 3%  Inhalation 3 milliLiter(s) Inhalation two times a day  tiZANidine 1 milliGRAM(s) Oral every 6 hours  vancomycin  IVPB 1000 milliGRAM(s) IV Intermittent every 24 hours    MEDICATIONS  (PRN):  HYDROmorphone PCA (1 mG/mL) Rescue Clinician Bolus 0.5 milliGRAM(s) IV Push every 15 minutes PRN for Pain Scale GREATER THAN 6  LORazepam     Tablet 0.5 milliGRAM(s) Oral two times a day PRN Anxiety  naloxone Injectable 0.1 milliGRAM(s) IV Push every 3 minutes PRN For ANY of the following changes in patient status:  A. RR LESS THAN 10 breaths per minute, B. Oxygen saturation LESS THAN 90%, C. Sedation score of 6  ondansetron Injectable 4 milliGRAM(s) IV Push every 6 hours PRN Nausea      OBJECTIVE:  Patient lying in bed sleeping comfortably with NG tube and intubated.    Sedation Score:	[ X] Alert	[ ] Drowsy 	[ ] Arousable	[ ] Asleep	[ ] Unresponsive    Side Effects:	[X ] None	[ ] Nausea	[ ] Vomiting	[ ] Pruritus  		[ ] Other:    Vital Signs Last 24 Hrs  T(C): 37.2 (18 Jan 2019 08:00), Max: 37.7 (17 Jan 2019 12:00)  T(F): 98.9 (18 Jan 2019 08:00), Max: 99.9 (17 Jan 2019 12:00)  HR: 85 (18 Jan 2019 09:58) (85 - 989)  BP: 120/68 (18 Jan 2019 09:00) (97/56 - 159/56)  BP(mean): 80 (18 Jan 2019 09:00) (62 - 108)  RR: 17 (18 Jan 2019 09:00) (14 - 26)  SpO2: 94% (18 Jan 2019 09:00) (91% - 100%)    ASSESSMENT/ PLAN    Therapy to  be:	[ X] Continue   [ ] Discontinued   [ ] Change to prn Analgesics    Documentation and Verification of current medications:   [X] Done	[ ] Not done, not elligible    Comments:  Patient has a CPOT score of 0/10, minimal or no pain present.  Continue current pain regimen.

## 2019-01-18 NOTE — PROGRESS NOTE ADULT - SUBJECTIVE AND OBJECTIVE BOX
Follow Up:      Inverval History/ROS:Patient is a 63y old  Female who presents with a chief complaint of SBO (17 Jan 2019 14:19)  S/p surgery, re-op for evisciration.    Now with enterococcal bacteremia    Afebrile.        Allergies    No Known Allergies    Intolerances        ANTIMICROBIALS:  piperacillin/tazobactam IVPB. 3.375 every 8 hours  vancomycin  IVPB 1000 every 24 hours      OTHER MEDS:  ALBUTerol    90 MICROgram(s) HFA Inhaler 1 Puff(s) Inhalation every 6 hours  amLODIPine   Tablet 10 milliGRAM(s) Oral daily  buDESOnide 160 MICROgram(s)/formoterol 4.5 MICROgram(s) Inhaler 2 Puff(s) Inhalation two times a day  chlorhexidine 0.12% Liquid 15 milliLiter(s) Swish and Spit two times a day  chlorhexidine 4% Liquid 1 Application(s) Topical <User Schedule>  enoxaparin Injectable 40 milliGRAM(s) SubCutaneous daily  furosemide   Injectable 40 milliGRAM(s) IV Push daily  gabapentin   Solution 300 milliGRAM(s) Oral three times a day  hydrocortisone sodium succinate Injectable 25 milliGRAM(s) IV Push every 12 hours  HYDROmorphone PCA (1 mG/mL) 30 milliLiter(s) PCA Continuous PCA Continuous  HYDROmorphone PCA (1 mG/mL) Rescue Clinician Bolus 0.5 milliGRAM(s) IV Push every 15 minutes PRN  influenza   Vaccine 0.5 milliLiter(s) IntraMuscular once  insulin glargine Injectable (LANTUS) 22 Unit(s) SubCutaneous at bedtime  insulin lispro (HumaLOG) corrective regimen sliding scale   SubCutaneous every 8 hours  insulin lispro Injectable (HumaLOG) 10 Unit(s) SubCutaneous every 8 hours  levothyroxine 137 MICROGram(s) Oral daily  LORazepam     Tablet 0.5 milliGRAM(s) Oral two times a day PRN  metoprolol tartrate 25 milliGRAM(s) Enteral Tube two times a day  naloxone Injectable 0.1 milliGRAM(s) IV Push every 3 minutes PRN  ondansetron Injectable 4 milliGRAM(s) IV Push every 6 hours PRN  pantoprazole   Suspension 40 milliGRAM(s) Oral daily  simvastatin 20 milliGRAM(s) Oral at bedtime  sodium chloride 3%  Inhalation 3 milliLiter(s) Inhalation two times a day      Vital Signs Last 24 Hrs  T(C): 37.2 (18 Jan 2019 08:00), Max: 37.3 (18 Jan 2019 04:00)  T(F): 98.9 (18 Jan 2019 08:00), Max: 99.2 (18 Jan 2019 04:00)  HR: 105 (18 Jan 2019 13:00) (85 - 989)  BP: 139/79 (18 Jan 2019 13:00) (97/56 - 159/56)  BP(mean): 91 (18 Jan 2019 13:00) (62 - 108)  RR: 23 (18 Jan 2019 13:00) (14 - 26)  SpO2: 94% (18 Jan 2019 13:00) (91% - 100%)    PHYSICAL EXAM:  General: [x ] intubated  HEAD/EYES: [ ] PERRL [x ] white sclera [ ] icterus  ENT:  [ ] normal [x ] supple [ ] thrush [ ] pharyngeal exudate  Cardiovascular:   [ ] murmur [x ] normal [ ] PPM/AICD  Respiratory:  [x ] clear to ausculation bilaterally  GI:  [x ] soft, non-tender, normal bowel sounds  :  [ ] iraheta [ x] no CVA tenderness   Musculoskeletal:  [ ]x no synovitis  Neurologic:  [x ] non-focal exam   Skin:  [ x] midline abd wound with retention sutures  Lymph: [x ] no lymphadenopathy  Psychiatric:  [ ] appropriate affect [ x] alert & oriented  Lines:  [x ] no phlebitis [ ] central line                                9.0    13.79 )-----------( 472      ( 18 Jan 2019 03:00 )             30.7       01-18    147<H>  |  105  |  13  ----------------------------<  177<H>  4.1   |  27  |  0.82    Ca    9.3      18 Jan 2019 03:00  Phos  3.0     01-18  Mg     2.2     01-18            MICROBIOLOGY:  Culture - Blood (01.17.19 @ 10:09)    Culture - Blood:   NO ORGANISMS ISOLATED  NO ORGANISMS ISOLATED AT 24 HOURS    Specimen Source: BLOOD VENOUS      RADIOLOGY:

## 2019-01-18 NOTE — PROGRESS NOTE ADULT - SUBJECTIVE AND OBJECTIVE BOX
Anesthesia Pain Management Service    SUBJECTIVE: Pt doing well with IV PCA without problems reported.    Therapy:	  [ X] IV PCA	   [ ] Epidural           [ ] s/p Spinal Opoid              [ ] Postpartum infusion	  [ ] Patient controlled regional anesthesia (PCRA)    [ ] prn Analgesics    Allergies    No Known Allergies    Intolerances      MEDICATIONS  (STANDING):  ALBUTerol    90 MICROgram(s) HFA Inhaler 1 Puff(s) Inhalation every 6 hours  amLODIPine   Tablet 10 milliGRAM(s) Oral daily  buDESOnide 160 MICROgram(s)/formoterol 4.5 MICROgram(s) Inhaler 2 Puff(s) Inhalation two times a day  chlorhexidine 0.12% Liquid 15 milliLiter(s) Swish and Spit two times a day  chlorhexidine 4% Liquid 1 Application(s) Topical <User Schedule>  enoxaparin Injectable 40 milliGRAM(s) SubCutaneous daily  furosemide   Injectable 40 milliGRAM(s) IV Push daily  gabapentin   Solution 300 milliGRAM(s) Oral three times a day  hydrocortisone sodium succinate Injectable 25 milliGRAM(s) IV Push every 12 hours  HYDROmorphone PCA (1 mG/mL) 30 milliLiter(s) PCA Continuous PCA Continuous  influenza   Vaccine 0.5 milliLiter(s) IntraMuscular once  insulin glargine Injectable (LANTUS) 22 Unit(s) SubCutaneous at bedtime  insulin lispro (HumaLOG) corrective regimen sliding scale   SubCutaneous every 8 hours  insulin lispro Injectable (HumaLOG) 10 Unit(s) SubCutaneous every 8 hours  levothyroxine 137 MICROGram(s) Oral daily  metoprolol tartrate 25 milliGRAM(s) Enteral Tube two times a day  pantoprazole   Suspension 40 milliGRAM(s) Oral daily  piperacillin/tazobactam IVPB. 3.375 Gram(s) IV Intermittent every 8 hours  simvastatin 20 milliGRAM(s) Oral at bedtime  sodium chloride 3%  Inhalation 3 milliLiter(s) Inhalation two times a day  tiZANidine 1 milliGRAM(s) Oral every 6 hours  vancomycin  IVPB 1000 milliGRAM(s) IV Intermittent every 24 hours    MEDICATIONS  (PRN):  HYDROmorphone PCA (1 mG/mL) Rescue Clinician Bolus 0.5 milliGRAM(s) IV Push every 15 minutes PRN for Pain Scale GREATER THAN 6  LORazepam     Tablet 0.5 milliGRAM(s) Oral two times a day PRN Anxiety  naloxone Injectable 0.1 milliGRAM(s) IV Push every 3 minutes PRN For ANY of the following changes in patient status:  A. RR LESS THAN 10 breaths per minute, B. Oxygen saturation LESS THAN 90%, C. Sedation score of 6  ondansetron Injectable 4 milliGRAM(s) IV Push every 6 hours PRN Nausea      OBJECTIVE:   [X] No new signs     [ ] Other:    Side Effects:  [X ] None			[ ] Other:    Assessment of Catheter Site:		[ ] Intact		[ ] Other:    ASSESSMENT/PLAN  [ X] Continue current therapy    [ ] Therapy changed to:    [ ] IV PCA       [ ] Epidural     [ ] prn Analgesics     Comments:

## 2019-01-18 NOTE — PROGRESS NOTE ADULT - ASSESSMENT
63F w/ multiple respiratory comorbidities p/w SBO s/p ex-lap with findings of healthy bowel c/b evisceration of bowel s/p ex-lap with placement of retention sutures, now with worsening separation at skin edges requiring SICU level care including intubation for worsed respiratory status.    Plan  - pain control with dilaudid PCA gabapentin 300mg BID via NGT and gabapentin  - NPO w/ tube feeds  - Monitor GI fxn   - Monitor respiratory status  - DVT ppx: Lovenox  - Follow care plan per SICU team/ Per family and patient's wishes, planning for transfer to [facility] is in progress     KEELEY Colvin [id number]  B team surgery [id number]

## 2019-01-18 NOTE — PROGRESS NOTE ADULT - SUBJECTIVE AND OBJECTIVE BOX
B-Team Surgery Progress Note     Subjective/24hour Events: Patient continues to require pressure support. PRN ativan for anxiety. Patient on basal/bolus insulin. NG tube feeds.    ICU Vital Signs Last 24 Hrs  T(C): 37.2 (18 Jan 2019 08:00), Max: 37.7 (17 Jan 2019 12:00)  T(F): 98.9 (18 Jan 2019 08:00), Max: 99.9 (17 Jan 2019 12:00)  HR: 96 (18 Jan 2019 11:00) (85 - 989)  BP: 110/62 (18 Jan 2019 11:00) (97/56 - 159/56)  BP(mean): 71 (18 Jan 2019 11:00) (62 - 108)  ABP: --  ABP(mean): --  RR: 19 (18 Jan 2019 11:00) (14 - 26)  SpO2: 94% (18 Jan 2019 11:00) (91% - 100%)    CAPILLARY BLOOD GLUCOSE      POCT Blood Glucose.: 163 mg/dL (18 Jan 2019 06:00)  POCT Blood Glucose.: 204 mg/dL (17 Jan 2019 23:04)  POCT Blood Glucose.: 107 mg/dL (17 Jan 2019 14:07)      17 Jan 2019 07:01  -  18 Jan 2019 07:00  --------------------------------------------------------  IN:    Enteral Tube Flush: 200 mL    Glucerna: 1200 mL    IV PiggyBack: 550 mL  Total IN: 1950 mL    OUT:    Rectal Tube: 700 mL    Voided: 725 mL  Total OUT: 1425 mL    Total NET: 525 mL      18 Jan 2019 07:01  -  18 Jan 2019 11:17  --------------------------------------------------------  IN:    Enteral Tube Flush: 100 mL  Total IN: 100 mL    OUT:    Voided: 750 mL  Total OUT: 750 mL    Total NET: -650 mL      MEDICATIONS  (STANDING):  ALBUTerol    90 MICROgram(s) HFA Inhaler 1 Puff(s) Inhalation every 6 hours  amLODIPine   Tablet 10 milliGRAM(s) Oral daily  buDESOnide 160 MICROgram(s)/formoterol 4.5 MICROgram(s) Inhaler 2 Puff(s) Inhalation two times a day  chlorhexidine 0.12% Liquid 15 milliLiter(s) Swish and Spit two times a day  chlorhexidine 4% Liquid 1 Application(s) Topical <User Schedule>  enoxaparin Injectable 40 milliGRAM(s) SubCutaneous daily  furosemide   Injectable 40 milliGRAM(s) IV Push daily  gabapentin   Solution 300 milliGRAM(s) Oral three times a day  hydrocortisone sodium succinate Injectable 25 milliGRAM(s) IV Push every 12 hours  HYDROmorphone PCA (1 mG/mL) 30 milliLiter(s) PCA Continuous PCA Continuous  influenza   Vaccine 0.5 milliLiter(s) IntraMuscular once  insulin glargine Injectable (LANTUS) 22 Unit(s) SubCutaneous at bedtime  insulin lispro (HumaLOG) corrective regimen sliding scale   SubCutaneous every 8 hours  insulin lispro Injectable (HumaLOG) 10 Unit(s) SubCutaneous every 8 hours  levothyroxine 137 MICROGram(s) Oral daily  metoprolol tartrate 25 milliGRAM(s) Enteral Tube two times a day  pantoprazole   Suspension 40 milliGRAM(s) Oral daily  piperacillin/tazobactam IVPB. 3.375 Gram(s) IV Intermittent every 8 hours  simvastatin 20 milliGRAM(s) Oral at bedtime  sodium chloride 3%  Inhalation 3 milliLiter(s) Inhalation two times a day  tiZANidine 1 milliGRAM(s) Oral every 6 hours  vancomycin  IVPB 1000 milliGRAM(s) IV Intermittent every 24 hours    MEDICATIONS  (PRN):  HYDROmorphone PCA (1 mG/mL) Rescue Clinician Bolus 0.5 milliGRAM(s) IV Push every 15 minutes PRN for Pain Scale GREATER THAN 6  LORazepam     Tablet 0.5 milliGRAM(s) Oral two times a day PRN Anxiety  naloxone Injectable 0.1 milliGRAM(s) IV Push every 3 minutes PRN For ANY of the following changes in patient status:  A. RR LESS THAN 10 breaths per minute, B. Oxygen saturation LESS THAN 90%, C. Sedation score of 6  ondansetron Injectable 4 milliGRAM(s) IV Push every 6 hours PRN Nausea      Physical Exam:  Gen: NAD  LS: nml respiratory effort (with CPAP)  Card: pulse regularly present  GI: abd soft, retention sutures in place, aquacel in place w/ dressing c/d/i  Ext: warm      CBC (01-18 @ 03:00)                              9.0<L>                         13.79<H>  )----------------(  472<H>     61.9  % Neutrophils, 23.0  % Lymphocytes, ANC: 8.53<H>                              30.7<L>              CBC (01-17 @ 03:00)                              8.4<L>                         11.13<H>  )----------------(  415<H>     --    % Neutrophils, --    % Lymphocytes, ANC: --                                  28.3<L>                BMP (01-18 @ 03:00)             147<H>  |  105     |  13    		Ca++ --      Ca 9.3                ---------------------------------( 177<H>		Mg 2.2                4.1     |  27      |  0.82  			Ph 3.0     BMP (01-17 @ 03:00)             146<H>  |  106     |  16    		Ca++ --      Ca 8.9                ---------------------------------( 156<H>		Mg 2.2                4.0     |  27      |  0.85  			Ph 2.5               -> BLOOD VENOUS Culture (01-17 @ 10:09)     NG    NG  NG    -> OTHER Culture (01-12 @ 21:49)     NG    Enterococcus faecalis  Corynebacterium species  NG    -> BLOOD PERIPHERAL Culture (01-11 @ 21:31)       ***** CRITICAL RESULT *****  PERSON CALLED / READ-BACK: /YES  DATE / TIME CALLED: 01/12/19 [id number]  CALLED BY: DIANN RUSHING  GPCPR^Gram Pos Cocci in Pairs  AFTER: 14 HOURS INCUBATION  BOTTLE: AEROBIC BOTTLE  GPCCH^Gram Pos Cocci in Chains  AFTER: 14 HOURS INCUBATION  BOTTLE: AEROBIC BOTTLE    BLOOD CULTURE PCR  Enterococcus faecium  NG    -> BLOOD VENOUS Culture (01-11 @ 20:25)     NG    NG  NG    -> BRONCHIAL LAVAGE Culture (01-09 @ 12:31)       GPR^Gram Positive Rods  QUANTITY OF BACTERIA SEEN: RARE (1+)  WBC^White Blood Cells  QNTY CELLS IN GRAM STAIN: RARE (1+)      NRF^Normal Respiratory Perla  QUANTITY OF GROWTH: MANY  NG

## 2019-01-18 NOTE — PROGRESS NOTE ADULT - SUBJECTIVE AND OBJECTIVE BOX
SICU Daily Progress Note  =====================================================  Interval/Overnight Events: Pain control improved with PCA, gabapentin frequency  increased to tid from bid, tizanidine added.   Family requesting transfer, pending preauthorization from [facility].    HPI  63y Female with pmh COPD, DM, RA, hypothyroid, pulm HTN, CHRIS, HTN admitted on 12/15 for SBO, underwent exploratory laparotomy and adhesiolysis, no transition point was noted. pt transferred to SICU for intentional delayed extubation post op due to pulm comorbidities.   transferred to floors after extubation, eviscerated after having violent coughing episode, RTOR, returned to SICU for intentional delayed extubation. Pt transferred to RCU as developed viral multifocal pneumonia, returned 1/6 for worsening respiratory distress, intubated in SICU and remains intubated till date. Currently course c/b E faecium bacteremia.     Allergies: No Known Allergies      MEDICATIONS:   --------------------------------------------------------------------------------------  Neurologic Medications  acetaminophen  IVPB .. 1000 milliGRAM(s) IV Intermittent once  gabapentin   Solution 300 milliGRAM(s) Oral three times a day  HYDROmorphone PCA (1 mG/mL) 30 milliLiter(s) PCA Continuous PCA Continuous  HYDROmorphone PCA (1 mG/mL) Rescue Clinician Bolus 0.5 milliGRAM(s) IV Push every 15 minutes PRN for Pain Scale GREATER THAN 6  LORazepam     Tablet 0.5 milliGRAM(s) Oral two times a day PRN Anxiety  ondansetron Injectable 4 milliGRAM(s) IV Push every 6 hours PRN Nausea  tiZANidine 1 milliGRAM(s) Oral every 6 hours    Respiratory Medications  ALBUTerol    90 MICROgram(s) HFA Inhaler 1 Puff(s) Inhalation every 6 hours  buDESOnide 160 MICROgram(s)/formoterol 4.5 MICROgram(s) Inhaler 2 Puff(s) Inhalation two times a day  sodium chloride 3%  Inhalation 3 milliLiter(s) Inhalation two times a day    Cardiovascular Medications  amLODIPine   Tablet 10 milliGRAM(s) Oral daily  furosemide   Injectable 40 milliGRAM(s) IV Push daily  metoprolol tartrate 25 milliGRAM(s) Enteral Tube two times a day    Gastrointestinal Medications  pantoprazole   Suspension 40 milliGRAM(s) Oral daily    Genitourinary Medications    Hematologic/Oncologic Medications  enoxaparin Injectable 40 milliGRAM(s) SubCutaneous daily  influenza   Vaccine 0.5 milliLiter(s) IntraMuscular once    Antimicrobial/Immunologic Medications  piperacillin/tazobactam IVPB. 3.375 Gram(s) IV Intermittent every 8 hours  vancomycin  IVPB 1000 milliGRAM(s) IV Intermittent every 24 hours    Endocrine/Metabolic Medications  hydrocortisone sodium succinate Injectable 25 milliGRAM(s) IV Push every 12 hours  insulin glargine Injectable (LANTUS) 22 Unit(s) SubCutaneous at bedtime  insulin lispro (HumaLOG) corrective regimen sliding scale   SubCutaneous every 8 hours  insulin lispro Injectable (HumaLOG) 10 Unit(s) SubCutaneous every 8 hours  levothyroxine 137 MICROGram(s) Oral daily  simvastatin 20 milliGRAM(s) Oral at bedtime    Topical/Other Medications  chlorhexidine 0.12% Liquid 15 milliLiter(s) Swish and Spit two times a day  chlorhexidine 4% Liquid 1 Application(s) Topical <User Schedule>  naloxone Injectable 0.1 milliGRAM(s) IV Push every 3 minutes PRN For ANY of the following changes in patient status:  A. RR LESS THAN 10 breaths per minute, B. Oxygen saturation LESS THAN 90%, C. Sedation score of 6    --------------------------------------------------------------------------------------    VITAL SIGNS, INS/OUTS (last 24 hours):  --------------------------------------------------------------------------------------  ((Insert SICU Vitals/Is+Os here))***  --------------------------------------------------------------------------------------    EXAM  NEUROLOGY  RASS: 0 to +1/+2  Exam: Normal, NAD, alert, oriented, no focal deficits  plan: ct PCA and gabapentin, ativan prn     HEENT  Exam: Normocephalic, atraumatic, EOMI.    RESPIRATORY  Exam: Lungs clear to auscultation, decreased air movement to bilateral bases   Mechanical Ventilation: Mode: CPAP with PS, FiO2: 50, PEEP: 8, MAP: 10, PIP: 22  on EtCO2 and SPO2 monitoring     CARDIOVASCULAR  Exam: S1, S2.  Regular rate and rhythm.    GI/NUTRITION  Exam: Soft, nontender, nondistended, incision gaping with fibrinous exudate, rectal pouch in situ    VASCULAR  Exam: Extremities warm, pink, well-perfused.    MUSCULOSKELETAL  Exam: All extremities moving spontaneously without limitations.     METABOLIC/FLUIDS/ELECTROLYTES      HEMATOLOGIC  [x] VTE Prophylaxis: enoxaparin Injectable 40 milliGRAM(s) SubCutaneous daily    Transfusions:	[] PRBC	[] Platelets		[] FFP	[] Cryoprecipitate    INFECTIOUS DISEASE  Antimicrobials/Immunologic Medications:  influenza   Vaccine 0.5 milliLiter(s) IntraMuscular once  piperacillin/tazobactam IVPB. 3.375 Gram(s) IV Intermittent every 8 hours  vancomycin  IVPB 1000 milliGRAM(s) IV Intermittent every 24 hours    Day #      of     ***    Tubes/Lines/Drains  ***  [x] Peripheral IV  [] Central Venous Line     	[] R	[] L	[] IJ	[] Fem	[] SC	Date Placed:   [] Arterial Line		[] R	[] L	[] Fem	[] Rad	[] Ax	Date Placed:   [] PICC		[] Midline		[] Mediport  [] Urinary Catheter		Date Placed:   [x] Necessity of urinary, arterial, and venous catheters discussed    LABS  --------------------------------------------------------------------------------------  ((Insert SICU Labs here))***  --------------------------------------------------------------------------------------    OTHER LABORATORY:     IMAGING STUDIES:   CXR:       63y Female hx of COPD presented initially with SBO, underwent surgery closed with 4 retention sutures, evicerated on 12/30 and closed with 6 more retention sutures, now c/b multifocal pneumonia, flu and respiratory distress /hypoxia on continuous bipap. Patient temporarily transferred to RCU, but developed hypercapnia and respiratory distress requiring intubation 1/6 and subsequent transfer to SICU. S/p bronch 1/9/18    #neuro  Awake, alert, follows commands, RASS 0 to +1  - on ativan 0.5 mg PO PRN for discomfort/ anxiety  - Pain control w/ PCA dilaudid and tizanidine, pt s/p ex lap with 10 retention sutures  - Appreciate acute pain service input   - C/w gabapentin 300mg tid    #resp  Hx COPD and moderate pulm HTN, former smoker, course c/b viral pneumonia, s/p bronch 1/9,  - Intubated, on AC CMV now, FIO2 50%, CPAP trial every day  - C/w inhalers, pulmozyme, pulmonary toilet   - OOB to chair   - appreciate pulm recs     #CV  HD stable  - Continue amlodipine 10mg  - Metoprolol 25mg po BID     #GI  s/p ex-lap for evisceration, 10 retention sutures in situ, gaping wound covered in fibrinous exudate   - Daily wound check and dressing by RN  - c/w protonix daily, home med  - Patient transitioned to bolus tube feeds, tolerating well    #renal  - BMP and I&O monitoring as routine   - iraheta removed, patient spontaneously voiding   - Repletion of electrolytes PRN  - Lasix 40mg IV qd    #heme  - H/H stable   - VTE ppx w/ Lovenox   - b/l LE venous duplex 1/14 no s/o DVT, calf veins not visualized well, IPCs and heparin    #ID  spiking fevers since friday, blood cultures sent, started on zosyn and vanco  - blood cultures growing Enterococcus faecium, wound culture 1/12 growing gpc and gpr,  - lines: iraheta, rt IJV (1/6) changed to lt IJV 1/13,  amaury ET  - TTE 1/12: good LV function, moderate pulm HTN, endocarditis can not be ruled out, needs MATEO if strongly suspected   - appreciate ID consult, ct abx  - Amenable to repeat blood cultures     #endo  - hx RA on chronic prednisone, DM, hypothyroid  - Hydrocortisone 25 mg BID, do not titrate further down as this is nearly equivalent to home dose   - Synthroid 137mcg qhs through ngt   - Endo following, appreciate recs  - Lantus 22 units q hs  - Humalog 10units TID AC (prior to bolus feeds)    Lines:  -left IJ central line  -amaury feeding tube  -ET    #dispo  -SICU    --------------------------------------------------------------------------------------    Critical Care Diagnoses: SICU Daily Progress Note  =====================================================  Interval/Overnight Events: Pain control improved with PCA, gabapentin, tizanidine.   Family requesting transfer, pending preauthorization from [facility] evening if no confirmation from [facility], transfer to MICU    HPI  63y Female with pmh COPD, DM, RA, hypothyroid, pulm HTN, CHRIS, HTN admitted on 12/15 for SBO, underwent exploratory laparotomy and adhesiolysis, no transition point was noted. pt transferred to SICU for intentional delayed extubation post op due to pulm comorbidities.   transferred to floors after extubation, eviscerated after having violent coughing episode, RTOR, returned to SICU for intentional delayed extubation. Pt transferred to RCU as developed viral multifocal pneumonia, returned 1/6 for worsening respiratory distress, intubated in SICU and remains intubated till date. Currently course c/b E faecium bacteremia on abx and ID following. Family requesting transfer out, pending presauthorization from [facility].     Allergies: No Known Allergies      MEDICATIONS:   --------------------------------------------------------------------------------------  Neurologic Medications  acetaminophen  IVPB .. 1000 milliGRAM(s) IV Intermittent once  gabapentin   Solution 300 milliGRAM(s) Oral three times a day  HYDROmorphone PCA (1 mG/mL) 30 milliLiter(s) PCA Continuous PCA Continuous  HYDROmorphone PCA (1 mG/mL) Rescue Clinician Bolus 0.5 milliGRAM(s) IV Push every 15 minutes PRN for Pain Scale GREATER THAN 6  LORazepam     Tablet 0.5 milliGRAM(s) Oral two times a day PRN Anxiety  ondansetron Injectable 4 milliGRAM(s) IV Push every 6 hours PRN Nausea  tiZANidine 1 milliGRAM(s) Oral every 6 hours    Respiratory Medications  ALBUTerol    90 MICROgram(s) HFA Inhaler 1 Puff(s) Inhalation every 6 hours  buDESOnide 160 MICROgram(s)/formoterol 4.5 MICROgram(s) Inhaler 2 Puff(s) Inhalation two times a day  sodium chloride 3%  Inhalation 3 milliLiter(s) Inhalation two times a day    Cardiovascular Medications  amLODIPine   Tablet 10 milliGRAM(s) Oral daily  furosemide   Injectable 40 milliGRAM(s) IV Push daily  metoprolol tartrate 25 milliGRAM(s) Enteral Tube two times a day    Gastrointestinal Medications  pantoprazole   Suspension 40 milliGRAM(s) Oral daily    Genitourinary Medications    Hematologic/Oncologic Medications  enoxaparin Injectable 40 milliGRAM(s) SubCutaneous daily    Antimicrobial/Immunologic Medications  piperacillin/tazobactam IVPB. 3.375 Gram(s) IV Intermittent every 8 hours  vancomycin  IVPB 1000 milliGRAM(s) IV Intermittent every 24 hours    Endocrine/Metabolic Medications  hydrocortisone sodium succinate Injectable 25 milliGRAM(s) IV Push every 12 hours  levothyroxine 137 MICROGram(s) Oral daily  simvastatin 20 milliGRAM(s) Oral at bedtime  insulin glargine Injectable (LANTUS) 22 Unit(s) SubCutaneous at bedtime  insulin lispro Injectable (HumaLOG) 10 Unit(s) SubCutaneous every 8 hours  insulin lispro (HumaLOG) corrective regimen sliding scale   SubCutaneous every 8 hours      Topical/Other Medications  chlorhexidine 0.12% Liquid 15 milliLiter(s) Swish and Spit two times a day  chlorhexidine 4% Liquid 1 Application(s) Topical <User Schedule>    ICU Vital Signs Last 24 Hrs  T(C): 37.3 (18 Jan 2019 04:00), Max: 37.7 (17 Jan 2019 12:00)  T(F): 99.2 (18 Jan 2019 04:00), Max: 99.9 (17 Jan 2019 12:00)  HR: 102 (18 Jan 2019 05:12) (86 - 128)  BP: 137/85 (18 Jan 2019 04:00) (97/56 - 159/56)  BP(mean): 96 (18 Jan 2019 04:00) (62 - 108)  ABP: --  ABP(mean): --  RR: 26 (18 Jan 2019 04:00) (16 - 28)  SpO2: 97% (18 Jan 2019 05:12) (90% - 100%)      EXAM  NEUROLOGY  RASS: 0 to +1/+2  Exam: Normal, NAD, alert, oriented, no focal deficits  plan: ct PCA and gabapentin, ativan prn     HEENT  Exam: Normocephalic, atraumatic, EOMI.    RESPIRATORY  Exam: Lungs clear to auscultation, decreased air movement to bilateral bases, no wheezing/rhonchi     Mechanical Ventilation: Mode: CPAP with PS, FiO2: 50, PEEP: 8, MAP: 10, PIP: 22    on EtCO2 and SPO2 monitoring     CARDIOVASCULAR  Exam: tachycardia, S1, S2.  Regular rate and rhythm.    GI/NUTRITION  Exam: Soft, nontender, nondistended, incision gaping with fibrinous exudate, rectal pouch in situ    Diet, NPO with Tube Feed:   Tube Feeding Modality: Nasogastric  Glucerna 1.2 Dewey (GLUCERNARTH)  Total Volume for 24 Hours (mL): 1200  Bolus   Total Volume of Bolus (mL):  400  Bolus Feed Rate (mL per Hour): 400   Bolus Feed Duration (in Hours): 1  Tube Feed Frequency: Every 8 hours   Tube Feed Start Time: 20:00  No Carb Prosource (1pkg = 15gms Protein)     Qty per Day:  2 (01-16-19 @ 15:45)    VASCULAR  Exam: Extremities warm, pink, well-perfused.    MUSCULOSKELETAL  Exam: All extremities moving spontaneously without limitations.     METABOLIC  CAPILLARY BLOOD GLUCOSE      POCT Blood Glucose.: 163 mg/dL (18 Jan 2019 06:00)  POCT Blood Glucose.: 204 mg/dL (17 Jan 2019 23:04)  POCT Blood Glucose.: 107 mg/dL (17 Jan 2019 14:07)    FLUIDS/ELECTROLYTES  01-18    147<H>  |  105  |  13  ----------------------------<  177<H>  4.1   |  27  |  0.82    Ca    9.3      18 Jan 2019 03:00  Phos  3.0     01-18  Mg     2.2     01-18      I&O's Detail    16 Jan 2019 07:01  -  17 Jan 2019 07:00  --------------------------------------------------------  IN:    Enteral Tube Flush: 80 mL    Glucerna: 1280 mL    IV PiggyBack: 550 mL  Total IN: 1910 mL    OUT:    Rectal Tube: 500 mL    Voided: 1400 mL  Total OUT: 1900 mL    Total NET: 10 mL      17 Jan 2019 07:01  -  18 Jan 2019 06:54  --------------------------------------------------------  IN:    Enteral Tube Flush: 100 mL    Glucerna: 800 mL    IV PiggyBack: 300 mL  Total IN: 1200 mL    OUT:    Rectal Tube: 400 mL    Voided: 725 mL  Total OUT: 1125 mL    Total NET: 75 mL    HEMATOLOGIC                        9.0    13.79 )-----------( 472      ( 18 Jan 2019 03:00 )             30.7     [x] VTE Prophylaxis: enoxaparin Injectable 40 milliGRAM(s) SubCutaneous daily    Transfusions:	[] PRBC	[] Platelets		[] FFP	[] Cryoprecipitate    INFECTIOUS DISEASE  WBC Count: 13.79 K/uL (01-18 @ 03:00)  WBC Count: 11.13 K/uL (01-17 @ 03:00)  WBC Count: 10.55 K/uL (01-16 @ 02:10)  WBC Count: 8.30 K/uL (01-15 @ 02:29)  WBC Count: 11.25 K/uL (01-14 @ 09:04)    RECENT CULTURES:  01-12 @ 21:49 OTHER Enterococcus faecalis  Corynebacterium species      01-11 @ 21:31 BLOOD PERIPHERAL BLOOD CULTURE PCR  Enterococcus faecium      01-11 @ 20:25 BLOOD VENOUS     NO ORGANISMS ISOLATED    01-09 @ 12:31 BRONCHIAL LAVAGE     normal respi jackelyn     Antimicrobials/Immunologic Medications:    piperacillin/tazobactam IVPB. 3.375 Gram(s) IV Intermittent every 8 hours  vancomycin  IVPB 1000 milliGRAM(s) IV Intermittent every 24 hours    Day #  7 of both abx     Tubes/Lines/Drains  ***  [] Peripheral IV  [x] Central Venous Line     	[] R	[x] L	[x] IJ	[] Fem	[] SC	Date Placed:   [] Arterial Line		[] R	[] L	[] Fem	[] Rad	[] Ax	Date Placed:   [] PICC		[] Midline		[] Mediport  [] Urinary Catheter		Date Placed:   [x] Necessity of urinary, arterial, and venous catheters discussed      OTHER LABORATORY:     IMAGING STUDIES:   CXR:

## 2019-01-18 NOTE — PROGRESS NOTE ADULT - ATTENDING COMMENTS
I saw and examined the patient and agree with the above note.    Pt awaiting transfer to [facility]  Continue excellent ICU care    Ryan Bear MD (Cell: [phone])  Acute and Critical Care Surgery    The Acute Care Surgery (B Team) Attending Group Practice:  Dr. Cole Nicole, Dr. Julio Koch, Dr. Divya Monahan, Dr. Ryan Bear    Urgent issues - spectra [phone] or [phone]  Nonurgent issues - [phone]  Patient appointments or after hours - [phone]

## 2019-01-18 NOTE — PROGRESS NOTE ADULT - ATTENDING COMMENTS
The patient is a critical care patient with hemodynamic and metabolic instability in SICU.  I have personally interviewed and examined this patient, reviewed labs and x-rays, discussed with other consultants, House staff and PA's.  I spent   35  minutes  in total providing critical care for the diagnoses, assessment and plan above.  These diagnoses are unrelated to the surgical procedure noted above.  I met with family  15   min to get further history and make care decisions for this patient who is unable to participate due to altered mental status.  Time involved in performance of separately billable procedures was not counted toward my critical care time.  There is no overlap.    Current Issues:  J96.91 Respiratory failure with hypoxia and hypercapnia, unspecified chronicity   - stable exam, although continues to require significant support to maintain appropriate ventilation and oxygenation.  Will likely need trach for prolonged ventilatory course.    J18.9 Multifocal pneumonia   - continue current abx  T81.30XA Wound dehiscence   - continue current wound care  E11.9 Type 2 diabetes mellitus without complication, unspecified whether long term insulin use   E03.9 Hypothyroidism, unspecified type   - basal/bolus insulin, riss  M06.9 Rheumatoid arthritis, involving unspecified site, unspecified rheumatoid factor presence   - continuing chronic steroids  D64.9 Anemia, unspecified type   - no signs of clinically significant bleeding on exam  Z91.89 At risk for malnutrition   - on TFs

## 2019-01-19 LAB
ANION GAP SERPL CALC-SCNC: 11 MMO/L — SIGNIFICANT CHANGE UP (ref 7–14)
BASOPHILS # BLD AUTO: 0.07 K/UL — SIGNIFICANT CHANGE UP (ref 0–0.2)
BASOPHILS NFR BLD AUTO: 0.7 % — SIGNIFICANT CHANGE UP (ref 0–2)
BUN SERPL-MCNC: 17 MG/DL — SIGNIFICANT CHANGE UP (ref 7–23)
CALCIUM SERPL-MCNC: 9.1 MG/DL — SIGNIFICANT CHANGE UP (ref 8.4–10.5)
CHLORIDE SERPL-SCNC: 105 MMOL/L — SIGNIFICANT CHANGE UP (ref 98–107)
CO2 SERPL-SCNC: 30 MMOL/L — SIGNIFICANT CHANGE UP (ref 22–31)
CREAT SERPL-MCNC: 0.78 MG/DL — SIGNIFICANT CHANGE UP (ref 0.5–1.3)
EOSINOPHIL # BLD AUTO: 0.2 K/UL — SIGNIFICANT CHANGE UP (ref 0–0.5)
EOSINOPHIL NFR BLD AUTO: 2 % — SIGNIFICANT CHANGE UP (ref 0–6)
GLUCOSE SERPL-MCNC: 152 MG/DL — HIGH (ref 70–99)
HCT VFR BLD CALC: 27.4 % — LOW (ref 34.5–45)
HGB BLD-MCNC: 8.2 G/DL — LOW (ref 11.5–15.5)
IMM GRANULOCYTES NFR BLD AUTO: 1.7 % — HIGH (ref 0–1.5)
LYMPHOCYTES # BLD AUTO: 1.79 K/UL — SIGNIFICANT CHANGE UP (ref 1–3.3)
LYMPHOCYTES # BLD AUTO: 17.6 % — SIGNIFICANT CHANGE UP (ref 13–44)
MAGNESIUM SERPL-MCNC: 2.2 MG/DL — SIGNIFICANT CHANGE UP (ref 1.6–2.6)
MCHC RBC-ENTMCNC: 29.9 % — LOW (ref 32–36)
MCHC RBC-ENTMCNC: 30.3 PG — SIGNIFICANT CHANGE UP (ref 27–34)
MCV RBC AUTO: 101.1 FL — HIGH (ref 80–100)
MONOCYTES # BLD AUTO: 1.2 K/UL — HIGH (ref 0–0.9)
MONOCYTES NFR BLD AUTO: 11.8 % — SIGNIFICANT CHANGE UP (ref 2–14)
NEUTROPHILS # BLD AUTO: 6.73 K/UL — SIGNIFICANT CHANGE UP (ref 1.8–7.4)
NEUTROPHILS NFR BLD AUTO: 66.2 % — SIGNIFICANT CHANGE UP (ref 43–77)
NRBC # FLD: 0.24 K/UL — LOW (ref 25–125)
NRBC FLD-RTO: 2.4 — SIGNIFICANT CHANGE UP
PHOSPHATE SERPL-MCNC: 3.6 MG/DL — SIGNIFICANT CHANGE UP (ref 2.5–4.5)
PLATELET # BLD AUTO: 419 K/UL — HIGH (ref 150–400)
PMV BLD: 9.5 FL — SIGNIFICANT CHANGE UP (ref 7–13)
POTASSIUM SERPL-MCNC: 4 MMOL/L — SIGNIFICANT CHANGE UP (ref 3.5–5.3)
POTASSIUM SERPL-SCNC: 4 MMOL/L — SIGNIFICANT CHANGE UP (ref 3.5–5.3)
RBC # BLD: 2.71 M/UL — LOW (ref 3.8–5.2)
RBC # FLD: 17.1 % — HIGH (ref 10.3–14.5)
SODIUM SERPL-SCNC: 146 MMOL/L — HIGH (ref 135–145)
VANCOMYCIN TROUGH SERPL-MCNC: 11.4 UG/ML — SIGNIFICANT CHANGE UP (ref 10–20)
WBC # BLD: 10.16 K/UL — SIGNIFICANT CHANGE UP (ref 3.8–10.5)
WBC # FLD AUTO: 10.16 K/UL — SIGNIFICANT CHANGE UP (ref 3.8–10.5)

## 2019-01-19 PROCEDURE — 71045 X-RAY EXAM CHEST 1 VIEW: CPT | Mod: 26

## 2019-01-19 PROCEDURE — 99291 CRITICAL CARE FIRST HOUR: CPT

## 2019-01-19 RX ORDER — ACETAMINOPHEN 500 MG
1000 TABLET ORAL ONCE
Qty: 0 | Refills: 0 | Status: COMPLETED | OUTPATIENT
Start: 2019-01-19 | End: 2019-01-19

## 2019-01-19 RX ADMIN — INSULIN GLARGINE 22 UNIT(S): 100 INJECTION, SOLUTION SUBCUTANEOUS at 22:25

## 2019-01-19 RX ADMIN — Medication 0.5 MILLIGRAM(S): at 04:46

## 2019-01-19 RX ADMIN — Medication 0.5 MILLIGRAM(S): at 13:38

## 2019-01-19 RX ADMIN — CHLORHEXIDINE GLUCONATE 1 APPLICATION(S): 213 SOLUTION TOPICAL at 10:54

## 2019-01-19 RX ADMIN — Medication 25 MILLIGRAM(S): at 18:14

## 2019-01-19 RX ADMIN — Medication 1000 MILLIGRAM(S): at 23:30

## 2019-01-19 RX ADMIN — PIPERACILLIN AND TAZOBACTAM 25 GRAM(S): 4; .5 INJECTION, POWDER, LYOPHILIZED, FOR SOLUTION INTRAVENOUS at 18:14

## 2019-01-19 RX ADMIN — HYDROMORPHONE HYDROCHLORIDE 30 MILLILITER(S): 2 INJECTION INTRAMUSCULAR; INTRAVENOUS; SUBCUTANEOUS at 19:29

## 2019-01-19 RX ADMIN — Medication 2: at 22:34

## 2019-01-19 RX ADMIN — GABAPENTIN 300 MILLIGRAM(S): 400 CAPSULE ORAL at 13:38

## 2019-01-19 RX ADMIN — CHLORHEXIDINE GLUCONATE 15 MILLILITER(S): 213 SOLUTION TOPICAL at 18:14

## 2019-01-19 RX ADMIN — CHLORHEXIDINE GLUCONATE 15 MILLILITER(S): 213 SOLUTION TOPICAL at 05:00

## 2019-01-19 RX ADMIN — PIPERACILLIN AND TAZOBACTAM 25 GRAM(S): 4; .5 INJECTION, POWDER, LYOPHILIZED, FOR SOLUTION INTRAVENOUS at 03:16

## 2019-01-19 RX ADMIN — Medication 137 MICROGRAM(S): at 05:00

## 2019-01-19 RX ADMIN — Medication 10 UNIT(S): at 06:25

## 2019-01-19 RX ADMIN — SODIUM CHLORIDE 3 MILLILITER(S): 9 INJECTION INTRAMUSCULAR; INTRAVENOUS; SUBCUTANEOUS at 10:07

## 2019-01-19 RX ADMIN — GABAPENTIN 300 MILLIGRAM(S): 400 CAPSULE ORAL at 05:00

## 2019-01-19 RX ADMIN — GABAPENTIN 300 MILLIGRAM(S): 400 CAPSULE ORAL at 22:26

## 2019-01-19 RX ADMIN — Medication 400 MILLIGRAM(S): at 22:54

## 2019-01-19 RX ADMIN — Medication 400 MILLIGRAM(S): at 05:01

## 2019-01-19 RX ADMIN — PANTOPRAZOLE SODIUM 40 MILLIGRAM(S): 20 TABLET, DELAYED RELEASE ORAL at 11:11

## 2019-01-19 RX ADMIN — ALBUTEROL 1 PUFF(S): 90 AEROSOL, METERED ORAL at 22:42

## 2019-01-19 RX ADMIN — Medication 1: at 13:54

## 2019-01-19 RX ADMIN — Medication 10 UNIT(S): at 22:26

## 2019-01-19 RX ADMIN — BUDESONIDE AND FORMOTEROL FUMARATE DIHYDRATE 2 PUFF(S): 160; 4.5 AEROSOL RESPIRATORY (INHALATION) at 10:06

## 2019-01-19 RX ADMIN — Medication 25 MILLIGRAM(S): at 05:00

## 2019-01-19 RX ADMIN — Medication 10 UNIT(S): at 13:54

## 2019-01-19 RX ADMIN — ALBUTEROL 1 PUFF(S): 90 AEROSOL, METERED ORAL at 03:56

## 2019-01-19 RX ADMIN — Medication 400 MILLIGRAM(S): at 14:56

## 2019-01-19 RX ADMIN — Medication 1000 MILLIGRAM(S): at 15:10

## 2019-01-19 RX ADMIN — SIMVASTATIN 20 MILLIGRAM(S): 20 TABLET, FILM COATED ORAL at 22:26

## 2019-01-19 RX ADMIN — PIPERACILLIN AND TAZOBACTAM 25 GRAM(S): 4; .5 INJECTION, POWDER, LYOPHILIZED, FOR SOLUTION INTRAVENOUS at 11:11

## 2019-01-19 RX ADMIN — HYDROMORPHONE HYDROCHLORIDE 30 MILLILITER(S): 2 INJECTION INTRAMUSCULAR; INTRAVENOUS; SUBCUTANEOUS at 07:12

## 2019-01-19 RX ADMIN — Medication 0.5 MILLIGRAM(S): at 22:54

## 2019-01-19 RX ADMIN — Medication 250 MILLIGRAM(S): at 06:28

## 2019-01-19 RX ADMIN — Medication 0.5 MILLIGRAM(S): at 09:07

## 2019-01-19 RX ADMIN — Medication 25 MILLIGRAM(S): at 05:01

## 2019-01-19 RX ADMIN — ALBUTEROL 1 PUFF(S): 90 AEROSOL, METERED ORAL at 10:07

## 2019-01-19 RX ADMIN — Medication 40 MILLIGRAM(S): at 05:00

## 2019-01-19 RX ADMIN — HYDROMORPHONE HYDROCHLORIDE 30 MILLILITER(S): 2 INJECTION INTRAMUSCULAR; INTRAVENOUS; SUBCUTANEOUS at 23:28

## 2019-01-19 RX ADMIN — AMLODIPINE BESYLATE 10 MILLIGRAM(S): 2.5 TABLET ORAL at 05:00

## 2019-01-19 RX ADMIN — Medication 1: at 06:25

## 2019-01-19 RX ADMIN — Medication 1000 MILLIGRAM(S): at 05:16

## 2019-01-19 RX ADMIN — BUDESONIDE AND FORMOTEROL FUMARATE DIHYDRATE 2 PUFF(S): 160; 4.5 AEROSOL RESPIRATORY (INHALATION) at 22:43

## 2019-01-19 RX ADMIN — SODIUM CHLORIDE 3 MILLILITER(S): 9 INJECTION INTRAMUSCULAR; INTRAVENOUS; SUBCUTANEOUS at 22:44

## 2019-01-19 RX ADMIN — Medication 1 APPLICATION(S): at 06:00

## 2019-01-19 NOTE — PROGRESS NOTE ADULT - ASSESSMENT
63y Female hx of COPD presented initially with SBO, underwent ex lap 12/15 closed with 4 retention sutures, evicerated on 12/30 and closed with 6 more retention sutures, c/b multifocal pneumonia, developed hypercapnic respiratory failure requiring intubation 1/6, S/p bronch 1/9/18, improving and tolerating CPAP, still intubated, course further c/b Enterococci bacteremia no on vanco and zosyn, improving awaiting transfer to either [facility] MICU or [facility]    Neuro:  - continue ativan 0.5 mg PO PRN for discomfort/ anxiety  - Pain control w/ PCA dilaudid and tizanidine, pt s/p ex lap with 10 retention sutures  - Appreciate acute pain service input   - C/w gabapentin 300mg tid    Resp:  - Patient with multifocal PNA  - Intubated, on PS/CPAP mode, tolerating well FIO2 40%  - C/w inhalers, pulmozyme, pulmonary toilet   - OOB to chair   - plan for extubation trial but family requested transfer out to [facility] hence delayed,   - appreciate pulm recs, Dr Lisker following, if not transferred to [facility], pt will be transferred to MICU in evening today    CV:   - Continue amlodipine 10mg  - Metoprolol 25mg po BID   - Lasix 40 mg od IV    GI:  - s/p 2*ex-lap, 10 retention sutures in situ, gaping wound covered in fibrinous exudate   - Daily wound check and dressing by RN  - c/w protonix daily, home med  - Patient on bolus tube feeds, tolerating well    Renal:  - BMP and I&O monitoring as routine   - iraheta removed, patient spontaneously voiding   - Repletion of electrolytes PRN    Heme:  - H/H stable   - VTE ppx w/ Lovenox and IPCs  - b/l LE venous duplex 1/14 no s/o DVT, calf veins not visualized well    ID:  - blood cultures 1/12 growing Enterococcus faecium, wound culture 1/12 growing gpc and gpr,  - lines: iraheta removed, rt IJV (1/6) changed to lt IJV 1/13,  ET 1/6  - TTE 1/12: good LV function, moderate pulm HTN, endocarditis can not be ruled out, needs MATEO if strongly suspected   - appreciate ID consult, ct abx  - repeat bcx 1/17, pending result     Endo:  - hx RA on chronic prednisone, DM, hypothyroid  - Hydrocortisone 25 mg BID, do not titrate further down as this is nearly equivalent to home dose   - Synthroid 137mcg qhs through ngt   - Lantus 22 units q hs  - Humalog 10units TID AC (prior to bolus feeds) along with low dose corrective scale to cover  - Endo following, appreciate recs    Lines:  -left IJ central line  -amaury feeding tube  -ET    Dispo:  -SICU, MICU/ [facility] 63y Female hx of COPD presented initially with SBO, underwent ex lap 12/15 closed with 4 retention sutures, evicerated on 12/30 and closed with 6 more retention sutures, c/b multifocal pneumonia, developed hypercapnic respiratory failure requiring intubation 1/6, S/p bronch 1/9/18, improving and tolerating CPAP, still intubated, course further c/b Enterococci bacteremia no on vanco and zosyn, improving awaiting transfer to either [facility]U or [facility]    Neuro:  - continue ativan 0.5 mg PO PRN for discomfort/ anxiety  - ct PCA dilaudid, pt s/p ex lap with 10 retention sutures, still c/o pain  - c/w tizanidine   - C/w gabapentin 300mg tid    Resp:  - Patient with multifocal viral PNA, improving   - Intubated, on PS/CPAP mode, weaned down to PEEP 5 PS 10 FiO2 40%   - C/w inhalers, pulmonary toilet   - OOB to chair   - plan for extubation trial but family requested transfer out to [facility] hence delayed,   - appreciate pulm recs, Dr Lisker following, if not transferred to [facility], pt will be transferred to MICU when bed is available     CV:   - Continue amlodipine 10mg  - Metoprolol 25mg po BID   - Lasix 40 mg od IV, aim net negative negative 1-2 liters each day     GI:  - s/p 2*ex-lap, 10 retention sutures in situ, gaping wound covered in fibrinous exudate   - Daily wound check and dressing by RN  - c/w protonix daily, home med  - Patient on bolus tube feeds, tolerating well    Renal:  - BMP and I&O monitoring as routine   - iraheta removed, patient spontaneously voiding   - Repletion of electrolytes PRN    Heme:  - H/H stable   - VTE ppx w/ Lovenox and IPCs  - b/l LE venous duplex 1/14 no s/o DVT, calf veins not visualized well    ID:  - blood cultures 1/12 growing Enterococcus faecium, wound culture 1/12 growing gpc and gpr,  - lines: iraheta removed, rt IJV (1/6) changed to lt IJV 1/13,  ET 1/6  - TTE 1/12: good LV function, moderate pulm HTN, endocarditis can not be ruled out, needs MATEO if strongly suspected   - appreciate ID consult, ct abx  - repeat bcx 1/17 NGTD  - on abx vanco and zosyn since 1/12, plan to stop as clinically stable     Endo:  - hx RA on chronic prednisone, DM, hypothyroid  - Hydrocortisone 25 mg BID, do not titrate further down as this is nearly equivalent to home dose   - Synthroid 137mcg qhs through ngt   - Lantus 22 units q hs  - Humalog 10units TID AC (prior to bolus feeds) along with low dose corrective scale to cover  - Endo following, appreciate recs  - pt c/o right knee pain, with h/o RA on steroids and recent bacteremia, might need imaging studies if not improved     Lines:  -left IJ central line  -amaury feeding tube  -ET    Dispo:  -SICU, MICU/ [facility] discussion with family: tracheostomy recommended, family does not want to discuss it as pt gets scared.

## 2019-01-19 NOTE — PROGRESS NOTE ADULT - SUBJECTIVE AND OBJECTIVE BOX
B-Team Surgery Progress Note     Subjective/24hour Events: Intubated on vent. Pain controlled. Mentating well. NPO w/ tube feeds.     Vital Signs:  Vital Signs Last 24 Hrs  T(C): 37.3 (19 Jan 2019 08:00), Max: 37.8 (19 Jan 2019 00:00)  T(F): 99.1 (19 Jan 2019 08:00), Max: 100.1 (19 Jan 2019 00:00)  HR: 101 (19 Jan 2019 11:00) (85 - 113)  BP: 134/77 (19 Jan 2019 11:00) (94/54 - 147/84)  BP(mean): 90 (19 Jan 2019 11:00) (63 - 105)  RR: 19 (19 Jan 2019 11:00) (16 - 24)  SpO2: 92% (19 Jan 2019 11:00) (92% - 99%)    CAPILLARY BLOOD GLUCOSE      POCT Blood Glucose.: 151 mg/dL (19 Jan 2019 06:24)  POCT Blood Glucose.: 201 mg/dL (18 Jan 2019 22:47)  POCT Blood Glucose.: 170 mg/dL (18 Jan 2019 13:02)      I&O's Detail    18 Jan 2019 07:01  -  19 Jan 2019 07:00  --------------------------------------------------------  IN:    Enteral Tube Flush: 480 mL    Glucerna: 1200 mL    IV PiggyBack: 650 mL  Total IN: 2330 mL    OUT:    Rectal Tube: 700 mL    Voided: 1400 mL  Total OUT: 2100 mL    Total NET: 230 mL      19 Jan 2019 07:01  -  19 Jan 2019 12:03  --------------------------------------------------------  IN:    Enteral Tube Flush: 220 mL    IV PiggyBack: 100 mL  Total IN: 320 mL    OUT:    Voided: 900 mL  Total OUT: 900 mL    Total NET: -580 mL            MEDICATIONS  (STANDING):  ALBUTerol    90 MICROgram(s) HFA Inhaler 1 Puff(s) Inhalation every 6 hours  amLODIPine   Tablet 10 milliGRAM(s) Oral daily  buDESOnide 160 MICROgram(s)/formoterol 4.5 MICROgram(s) Inhaler 2 Puff(s) Inhalation two times a day  chlorhexidine 0.12% Liquid 15 milliLiter(s) Swish and Spit two times a day  chlorhexidine 4% Liquid 1 Application(s) Topical <User Schedule>  collagenase Ointment 1 Application(s) Topical daily  enoxaparin Injectable 40 milliGRAM(s) SubCutaneous daily  furosemide   Injectable 40 milliGRAM(s) IV Push daily  gabapentin   Solution 300 milliGRAM(s) Oral three times a day  hydrocortisone sodium succinate Injectable 25 milliGRAM(s) IV Push every 12 hours  HYDROmorphone PCA (1 mG/mL) 30 milliLiter(s) PCA Continuous PCA Continuous  influenza   Vaccine 0.5 milliLiter(s) IntraMuscular once  insulin glargine Injectable (LANTUS) 22 Unit(s) SubCutaneous at bedtime  insulin lispro (HumaLOG) corrective regimen sliding scale   SubCutaneous every 8 hours  insulin lispro Injectable (HumaLOG) 10 Unit(s) SubCutaneous every 8 hours  levothyroxine 137 MICROGram(s) Oral daily  metoprolol tartrate 25 milliGRAM(s) Enteral Tube two times a day  pantoprazole   Suspension 40 milliGRAM(s) Oral daily  piperacillin/tazobactam IVPB. 3.375 Gram(s) IV Intermittent every 8 hours  simvastatin 20 milliGRAM(s) Oral at bedtime  sodium chloride 3%  Inhalation 3 milliLiter(s) Inhalation two times a day  vancomycin  IVPB 1000 milliGRAM(s) IV Intermittent every 24 hours    MEDICATIONS  (PRN):  HYDROmorphone PCA (1 mG/mL) Rescue Clinician Bolus 0.5 milliGRAM(s) IV Push every 15 minutes PRN for Pain Scale GREATER THAN 6  LORazepam     Tablet 0.5 milliGRAM(s) Oral two times a day PRN Anxiety  naloxone Injectable 0.1 milliGRAM(s) IV Push every 3 minutes PRN For ANY of the following changes in patient status:  A. RR LESS THAN 10 breaths per minute, B. Oxygen saturation LESS THAN 90%, C. Sedation score of 6  ondansetron Injectable 4 milliGRAM(s) IV Push every 6 hours PRN Nausea        Physical Exam:  Gen: NAD  HEENT: NG feeding tube in place  LS: nml respiratory effort  Card: pulse regularly present  GI: abd soft, retention sutures in place, aquacel in place w/ dressing c/d/i  Ext: warm      Labs:    01-19    146<H>  |  105  |  17  ----------------------------<  152<H>  4.0   |  30  |  0.78    Ca    9.1      19 Jan 2019 04:30  Phos  3.6     01-19  Mg     2.2     01-19                              8.2    10.16 )-----------( 419      ( 19 Jan 2019 04:30 )             27.4               Imaging:  EXAM:  XR CHEST PORTABLE ROUTINE 1V      PROCEDURE DATE:  Jan 19 2019     INTERPRETATION:  EXAMINATION: XR CHEST PORTABLE ROUTINE 1V    CLINICAL INDICATION: intubated    TECHNIQUE: Frontal radiograph of the chest was obtained.    COMPARISON: 1/17/2019.    FINDINGS:     Cardiac silhouette not well evaluated. Endotracheal tube tip 2 cm above   corwin. Enteric tube below diaphragm, tip not seen. Left-sided Mediport   with tip overlying SVC. Unchanged peripheral opacities. No pneumothorax.    IMPRESSION:   Unchanged peripheral opacities.

## 2019-01-19 NOTE — PROGRESS NOTE ADULT - SUBJECTIVE AND OBJECTIVE BOX
Anesthesia Pain Management Service    SUBJECTIVE: Patient is doing well with IV PCA and no significant problems reported.    Pain Scale Score	At rest: ___ 	With Activity: ___ 	[X ] Refer to charted pain scores    THERAPY:    [ ] IV PCA Morphine		[ ] 5 mg/mL	[ ] 1 mg/mL  [X ] IV PCA Hydromorphone	[ ] 5 mg/mL	[X ] 1 mg/mL  [ ] IV PCA Fentanyl		[ ] 50 micrograms/mL    Demand dose __0.2_ lockout __6_ (minutes) Continuous Rate _0__ Total: __6.3_  Daily      MEDICATIONS  (STANDING):  ALBUTerol    90 MICROgram(s) HFA Inhaler 1 Puff(s) Inhalation every 6 hours  amLODIPine   Tablet 10 milliGRAM(s) Oral daily  buDESOnide 160 MICROgram(s)/formoterol 4.5 MICROgram(s) Inhaler 2 Puff(s) Inhalation two times a day  chlorhexidine 0.12% Liquid 15 milliLiter(s) Swish and Spit two times a day  chlorhexidine 4% Liquid 1 Application(s) Topical <User Schedule>  collagenase Ointment 1 Application(s) Topical daily  enoxaparin Injectable 40 milliGRAM(s) SubCutaneous daily  furosemide   Injectable 40 milliGRAM(s) IV Push daily  gabapentin   Solution 300 milliGRAM(s) Oral three times a day  hydrocortisone sodium succinate Injectable 25 milliGRAM(s) IV Push every 12 hours  HYDROmorphone PCA (1 mG/mL) 30 milliLiter(s) PCA Continuous PCA Continuous  influenza   Vaccine 0.5 milliLiter(s) IntraMuscular once  insulin glargine Injectable (LANTUS) 22 Unit(s) SubCutaneous at bedtime  insulin lispro (HumaLOG) corrective regimen sliding scale   SubCutaneous every 8 hours  insulin lispro Injectable (HumaLOG) 10 Unit(s) SubCutaneous every 8 hours  levothyroxine 137 MICROGram(s) Oral daily  metoprolol tartrate 25 milliGRAM(s) Enteral Tube two times a day  pantoprazole   Suspension 40 milliGRAM(s) Oral daily  piperacillin/tazobactam IVPB. 3.375 Gram(s) IV Intermittent every 8 hours  simvastatin 20 milliGRAM(s) Oral at bedtime  sodium chloride 3%  Inhalation 3 milliLiter(s) Inhalation two times a day  vancomycin  IVPB 1000 milliGRAM(s) IV Intermittent every 24 hours    MEDICATIONS  (PRN):  HYDROmorphone PCA (1 mG/mL) Rescue Clinician Bolus 0.5 milliGRAM(s) IV Push every 15 minutes PRN for Pain Scale GREATER THAN 6  LORazepam     Tablet 0.5 milliGRAM(s) Oral two times a day PRN Anxiety  naloxone Injectable 0.1 milliGRAM(s) IV Push every 3 minutes PRN For ANY of the following changes in patient status:  A. RR LESS THAN 10 breaths per minute, B. Oxygen saturation LESS THAN 90%, C. Sedation score of 6  ondansetron Injectable 4 milliGRAM(s) IV Push every 6 hours PRN Nausea      OBJECTIVE:    Sedation Score:	[ X] Alert	[ ] Drowsy 	[ ] Arousable	[ ] Asleep	[ ] Unresponsive    Side Effects:	[X ] None	[ ] Nausea	[ ] Vomiting	[ ] Pruritus  		[ ] Other:    Vital Signs Last 24 Hrs  T(C): 37.3 (19 Jan 2019 08:00), Max: 37.8 (19 Jan 2019 00:00)  T(F): 99.1 (19 Jan 2019 08:00), Max: 100.1 (19 Jan 2019 00:00)  HR: 108 (19 Jan 2019 09:00) (85 - 113)  BP: 119/65 (19 Jan 2019 09:00) (94/54 - 147/84)  BP(mean): 74 (19 Jan 2019 09:00) (63 - 105)  RR: 21 (19 Jan 2019 09:00) (16 - 24)  SpO2: 94% (19 Jan 2019 09:00) (92% - 99%)    ASSESSMENT/ PLAN    Therapy to  be:	[ X] Continue   [ ] Discontinued   [ ] Change to prn Analgesics    Documentation and Verification of current medications:   [X] Done	[ ] Not done, not elligible    Comments:

## 2019-01-19 NOTE — PROGRESS NOTE ADULT - ATTENDING COMMENTS
I saw and examined the patient and agree with the above note.    Ryan Bear MD (Cell: [phone])  Acute and Critical Care Surgery    The Acute Care Surgery (B Team) Attending Group Practice:  Dr. Cole Nicole, Dr. Julio Koch, Dr. Divya Monahan, Dr. Ryan Bear    Urgent issues - spectra [phone] or [phone]  Nonurgent issues - [phone]  Patient appointments or after hours - [phone]

## 2019-01-19 NOTE — PROGRESS NOTE ADULT - ATTENDING COMMENTS
The patient is a critical care patient with hemodynamic and metabolic instability in SICU.  I have personally interviewed and examined this patient, reviewed labs and x-rays, discussed with other consultants, House staff and PA's.  I spent  40   minutes  in total providing critical care for the diagnoses, assessment and plan above.  These diagnoses are unrelated to the surgical procedure noted above.  Time involved in performance of separately billable procedures was not counted toward my critical care time.  There is no overlap.    Current Issues:  J96.91 Respiratory failure with hypoxia and hypercapnia, unspecified chronicity   - slowly down-titrating PS and PEEP.  Now at 10/5.  O>I as hemodynamics tolerate.  Continuing treatment for multifocal pneumonia.  I have explained to patient and family the likelihood she would need a trach in order to wean off the ventilator in a safe manner, especially given her critical airway.  J18.9 Multifocal pneumonia   - continuing zosyn/vanc  T81.30XA Wound dehiscence   - optimizing nutrition, continue to monitor  E11.9 Type 2 diabetes mellitus without complication, unspecified whether long term insulin use   - basal/bolus  E03.9 Hypothyroidism, unspecified type   - on synthroid  M06.9 Rheumatoid arthritis, involving unspecified site, unspecified rheumatoid factor presence   - on chronic steroids  D64.9 Anemia, unspecified type   - no signs of clinically significant bleeding on exam  Z91.89 At risk for malnutrition   - on TFs

## 2019-01-19 NOTE — PROGRESS NOTE ADULT - ASSESSMENT
63F w/ multiple respiratory comorbidities p/w SBO s/p ex-lap with findings of healthy bowel c/b evisceration of bowel s/p ex-lap with placement of retention sutures, now with worsening separation at skin edges requiring SICU level care including intubation for worsed respiratory status.    Plan  - Pain control with dilaudid PCA gabapentin 300mg BID via NGT and gabapentin  - NPO w/ tube feeds  - Monitor GI fxn   - Monitor respiratory status  - DVT ppx: Lovenox  - Follow care plan per SICU team/ Per family and patient's wishes, planning for transfer to [facility] is in progress 63F w/ multiple respiratory comorbidities p/w SBO s/p ex-lap with findings of healthy bowel c/b evisceration of bowel s/p ex-lap with placement of retention sutures, now with worsening separation at skin edges requiring SICU level care including intubation for worsed respiratory status.    Plan  - Pain control continued  - NPO w/ tube feeds  - Monitor GI fxn   - Monitor respiratory status  - DVT ppx: Lovenox  - Follow care plan per SICU team/ Per family and patient's wishes, planning for transfer to [facility] is in progress

## 2019-01-19 NOTE — PROGRESS NOTE ADULT - SUBJECTIVE AND OBJECTIVE BOX
SICU Daily Progress Note  =====================================================  Interval/Overnight Events:  Pain control improved with PCA, gabapentin, tizanidine.   Family still requesting transfer, pending preauthorization from [facility]. MICU will accept patient if beds are available.    HPI: 63y Female with pmh COPD, DM, RA, hypothyroid, pulm HTN, CHRIS, HTN admitted on 12/15 for SBO, underwent exploratory laparotomy and adhesiolysis, no transition point was noted. pt transferred to SICU for intentional delayed extubation post op due to pulm comorbidities.   transferred to floors after extubation, eviscerated after having violent coughing episode, RTOR, returned to SICU for intentional delayed extubation. Pt transferred to RCU as developed viral multifocal pneumonia, returned 1/6 for worsening respiratory distress, intubated in SICU and remains intubated till date. Currently course c/b E faecium bacteremia on abx and ID following. Family requesting transfer out, pending presauthorization from [facility].     Allergies: No Known Allergies      MEDICATIONS:   --------------------------------------------------------------------------------------  Neurologic Medications  gabapentin   Solution 300 milliGRAM(s) Oral three times a day  HYDROmorphone PCA (1 mG/mL) 30 milliLiter(s) PCA Continuous PCA Continuous  HYDROmorphone PCA (1 mG/mL) Rescue Clinician Bolus 0.5 milliGRAM(s) IV Push every 15 minutes PRN for Pain Scale GREATER THAN 6  LORazepam     Tablet 0.5 milliGRAM(s) Oral two times a day PRN Anxiety  ondansetron Injectable 4 milliGRAM(s) IV Push every 6 hours PRN Nausea    Respiratory Medications  ALBUTerol    90 MICROgram(s) HFA Inhaler 1 Puff(s) Inhalation every 6 hours  buDESOnide 160 MICROgram(s)/formoterol 4.5 MICROgram(s) Inhaler 2 Puff(s) Inhalation two times a day  sodium chloride 3%  Inhalation 3 milliLiter(s) Inhalation two times a day    Cardiovascular Medications  amLODIPine   Tablet 10 milliGRAM(s) Oral daily  furosemide   Injectable 40 milliGRAM(s) IV Push daily  metoprolol tartrate 25 milliGRAM(s) Enteral Tube two times a day    Gastrointestinal Medications  pantoprazole   Suspension 40 milliGRAM(s) Oral daily    Genitourinary Medications    Hematologic/Oncologic Medications  enoxaparin Injectable 40 milliGRAM(s) SubCutaneous daily  influenza   Vaccine 0.5 milliLiter(s) IntraMuscular once    Antimicrobial/Immunologic Medications  piperacillin/tazobactam IVPB. 3.375 Gram(s) IV Intermittent every 8 hours  vancomycin  IVPB 1000 milliGRAM(s) IV Intermittent every 24 hours    Endocrine/Metabolic Medications  hydrocortisone sodium succinate Injectable 25 milliGRAM(s) IV Push every 12 hours  insulin glargine Injectable (LANTUS) 22 Unit(s) SubCutaneous at bedtime  insulin lispro (HumaLOG) corrective regimen sliding scale   SubCutaneous every 8 hours  insulin lispro Injectable (HumaLOG) 10 Unit(s) SubCutaneous every 8 hours  levothyroxine 137 MICROGram(s) Oral daily  simvastatin 20 milliGRAM(s) Oral at bedtime    Topical/Other Medications  chlorhexidine 0.12% Liquid 15 milliLiter(s) Swish and Spit two times a day  chlorhexidine 4% Liquid 1 Application(s) Topical <User Schedule>  collagenase Ointment 1 Application(s) Topical daily  naloxone Injectable 0.1 milliGRAM(s) IV Push every 3 minutes PRN For ANY of the following changes in patient status:  A. RR LESS THAN 10 breaths per minute, B. Oxygen saturation LESS THAN 90%, C. Sedation score of 6      EXAM  NEUROLOGY  RASS: 0-1  Exam: Normal, NAD, alert, oriented x3, no focal deficits    HEENT  Exam: Normocephalic, atraumatic*    RESPIRATORY  Exam: Lungs clear to auscultation, decreased breath sounds at bases, no wheezing  Mechanical Ventilation: Mode: CPAP with PS, FiO2: 40, PEEP: 8, PS: 10, MAP: 11, PIP: 19    CARDIOVASCULAR  Exam: S1, S2.  Regular rate and rhythm.    GI/NUTRITION  Exam: Abdomen soft, Non-tender, incision site with exudate  Current Diet:  NPO with tube feeds    VASCULAR  Exam: Extremities warm, pink, well-perfused.     MUSCULOSKELETAL  Exam: All extremities moving spontaneously without limitations.       METABOLIC/FLUIDS/ELECTROLYTES      HEMATOLOGIC  [x] VTE Prophylaxis: enoxaparin Injectable 40 milliGRAM(s) SubCutaneous daily    Transfusions:	[] PRBC	[] Platelets		[] FFP	[] Cryoprecipitate    INFECTIOUS DISEASE  Antimicrobials/Immunologic Medications:  influenza   Vaccine 0.5 milliLiter(s) IntraMuscular once  piperacillin/tazobactam IVPB. 3.375 Gram(s) IV Intermittent every 8 hours  vancomycin  IVPB 1000 milliGRAM(s) IV Intermittent every 24 hours      Tubes/Lines/Drains    [x] Peripheral IV  [X] Central Venous Line     	[] R	[X] L	[X] IJ	[] Fem	[] SC	Date Placed:   [] Arterial Line		[] R	[] L	[] Fem	[] Rad	[] Ax	Date Placed:   [] PICC		[] Midline		[] Mediport  [] Urinary Catheter		Date Placed:   [x] Necessity of urinary, arterial, and venous catheters discussed

## 2019-01-20 LAB
ANION GAP SERPL CALC-SCNC: 12 MMO/L — SIGNIFICANT CHANGE UP (ref 7–14)
BASE EXCESS BLDV CALC-SCNC: 8.5 MMOL/L — SIGNIFICANT CHANGE UP
BLOOD GAS VENOUS - CREATININE: 0.47 MG/DL — LOW (ref 0.5–1.3)
BUN SERPL-MCNC: 16 MG/DL — SIGNIFICANT CHANGE UP (ref 7–23)
CALCIUM SERPL-MCNC: 9.1 MG/DL — SIGNIFICANT CHANGE UP (ref 8.4–10.5)
CHLORIDE BLDV-SCNC: 104 MMOL/L — SIGNIFICANT CHANGE UP (ref 96–108)
CHLORIDE SERPL-SCNC: 101 MMOL/L — SIGNIFICANT CHANGE UP (ref 98–107)
CO2 SERPL-SCNC: 31 MMOL/L — SIGNIFICANT CHANGE UP (ref 22–31)
CREAT SERPL-MCNC: 0.74 MG/DL — SIGNIFICANT CHANGE UP (ref 0.5–1.3)
GAS PNL BLDV: 144 MMOL/L — SIGNIFICANT CHANGE UP (ref 136–146)
GLUCOSE BLDV-MCNC: 223 — HIGH (ref 70–99)
GLUCOSE SERPL-MCNC: 121 MG/DL — HIGH (ref 70–99)
HCO3 BLDV-SCNC: 31 MMOL/L — HIGH (ref 20–27)
HCT VFR BLD CALC: 28.8 % — LOW (ref 34.5–45)
HCT VFR BLDV CALC: 30.4 % — LOW (ref 34.5–45)
HGB BLD-MCNC: 8.6 G/DL — LOW (ref 11.5–15.5)
HGB BLDV-MCNC: 9.8 G/DL — LOW (ref 11.5–15.5)
LACTATE BLDV-MCNC: 2 MMOL/L — SIGNIFICANT CHANGE UP (ref 0.5–2)
MAGNESIUM SERPL-MCNC: 2.1 MG/DL — SIGNIFICANT CHANGE UP (ref 1.6–2.6)
MCHC RBC-ENTMCNC: 29.9 % — LOW (ref 32–36)
MCHC RBC-ENTMCNC: 30.8 PG — SIGNIFICANT CHANGE UP (ref 27–34)
MCV RBC AUTO: 103.2 FL — HIGH (ref 80–100)
NRBC # FLD: 0.19 K/UL — LOW (ref 25–125)
NRBC FLD-RTO: 2 — SIGNIFICANT CHANGE UP
PCO2 BLDV: 60 MMHG — HIGH (ref 41–51)
PH BLDV: 7.37 PH — SIGNIFICANT CHANGE UP (ref 7.32–7.43)
PHOSPHATE SERPL-MCNC: 3.5 MG/DL — SIGNIFICANT CHANGE UP (ref 2.5–4.5)
PLATELET # BLD AUTO: 406 K/UL — HIGH (ref 150–400)
PMV BLD: 9.3 FL — SIGNIFICANT CHANGE UP (ref 7–13)
PO2 BLDV: 35 MMHG — SIGNIFICANT CHANGE UP (ref 35–40)
POTASSIUM BLDV-SCNC: 3.9 MMOL/L — SIGNIFICANT CHANGE UP (ref 3.4–4.5)
POTASSIUM SERPL-MCNC: 3.9 MMOL/L — SIGNIFICANT CHANGE UP (ref 3.5–5.3)
POTASSIUM SERPL-SCNC: 3.9 MMOL/L — SIGNIFICANT CHANGE UP (ref 3.5–5.3)
RBC # BLD: 2.79 M/UL — LOW (ref 3.8–5.2)
RBC # FLD: 16.8 % — HIGH (ref 10.3–14.5)
SAO2 % BLDV: 58.4 % — LOW (ref 60–85)
SODIUM SERPL-SCNC: 144 MMOL/L — SIGNIFICANT CHANGE UP (ref 135–145)
WBC # BLD: 9.44 K/UL — SIGNIFICANT CHANGE UP (ref 3.8–10.5)
WBC # FLD AUTO: 9.44 K/UL — SIGNIFICANT CHANGE UP (ref 3.8–10.5)

## 2019-01-20 PROCEDURE — 71045 X-RAY EXAM CHEST 1 VIEW: CPT | Mod: 26

## 2019-01-20 PROCEDURE — 99291 CRITICAL CARE FIRST HOUR: CPT

## 2019-01-20 RX ORDER — POTASSIUM CHLORIDE 20 MEQ
20 PACKET (EA) ORAL ONCE
Qty: 0 | Refills: 0 | Status: COMPLETED | OUTPATIENT
Start: 2019-01-20 | End: 2019-01-20

## 2019-01-20 RX ORDER — GABAPENTIN 400 MG/1
400 CAPSULE ORAL THREE TIMES A DAY
Qty: 0 | Refills: 0 | Status: DISCONTINUED | OUTPATIENT
Start: 2019-01-20 | End: 2019-01-29

## 2019-01-20 RX ADMIN — PIPERACILLIN AND TAZOBACTAM 25 GRAM(S): 4; .5 INJECTION, POWDER, LYOPHILIZED, FOR SOLUTION INTRAVENOUS at 02:32

## 2019-01-20 RX ADMIN — GABAPENTIN 400 MILLIGRAM(S): 400 CAPSULE ORAL at 14:29

## 2019-01-20 RX ADMIN — Medication 25 MILLIGRAM(S): at 17:00

## 2019-01-20 RX ADMIN — Medication 250 MILLIGRAM(S): at 05:05

## 2019-01-20 RX ADMIN — INSULIN GLARGINE 22 UNIT(S): 100 INJECTION, SOLUTION SUBCUTANEOUS at 21:32

## 2019-01-20 RX ADMIN — BUDESONIDE AND FORMOTEROL FUMARATE DIHYDRATE 2 PUFF(S): 160; 4.5 AEROSOL RESPIRATORY (INHALATION) at 10:34

## 2019-01-20 RX ADMIN — Medication 25 MILLIGRAM(S): at 17:01

## 2019-01-20 RX ADMIN — HYDROMORPHONE HYDROCHLORIDE 30 MILLILITER(S): 2 INJECTION INTRAMUSCULAR; INTRAVENOUS; SUBCUTANEOUS at 07:25

## 2019-01-20 RX ADMIN — Medication 0.5 MILLIGRAM(S): at 23:48

## 2019-01-20 RX ADMIN — BUDESONIDE AND FORMOTEROL FUMARATE DIHYDRATE 2 PUFF(S): 160; 4.5 AEROSOL RESPIRATORY (INHALATION) at 22:20

## 2019-01-20 RX ADMIN — Medication 10 UNIT(S): at 05:14

## 2019-01-20 RX ADMIN — AMLODIPINE BESYLATE 10 MILLIGRAM(S): 2.5 TABLET ORAL at 05:08

## 2019-01-20 RX ADMIN — Medication 0.5 MILLIGRAM(S): at 09:23

## 2019-01-20 RX ADMIN — SODIUM CHLORIDE 3 MILLILITER(S): 9 INJECTION INTRAMUSCULAR; INTRAVENOUS; SUBCUTANEOUS at 22:21

## 2019-01-20 RX ADMIN — PANTOPRAZOLE SODIUM 40 MILLIGRAM(S): 20 TABLET, DELAYED RELEASE ORAL at 11:52

## 2019-01-20 RX ADMIN — CHLORHEXIDINE GLUCONATE 15 MILLILITER(S): 213 SOLUTION TOPICAL at 17:00

## 2019-01-20 RX ADMIN — ALBUTEROL 1 PUFF(S): 90 AEROSOL, METERED ORAL at 04:21

## 2019-01-20 RX ADMIN — Medication 2: at 14:30

## 2019-01-20 RX ADMIN — Medication 25 MILLIGRAM(S): at 05:08

## 2019-01-20 RX ADMIN — Medication 1: at 21:30

## 2019-01-20 RX ADMIN — GABAPENTIN 400 MILLIGRAM(S): 400 CAPSULE ORAL at 21:33

## 2019-01-20 RX ADMIN — Medication 0.5 MILLIGRAM(S): at 13:01

## 2019-01-20 RX ADMIN — ENOXAPARIN SODIUM 40 MILLIGRAM(S): 100 INJECTION SUBCUTANEOUS at 11:51

## 2019-01-20 RX ADMIN — CHLORHEXIDINE GLUCONATE 1 APPLICATION(S): 213 SOLUTION TOPICAL at 14:34

## 2019-01-20 RX ADMIN — Medication 40 MILLIGRAM(S): at 05:07

## 2019-01-20 RX ADMIN — HYDROMORPHONE HYDROCHLORIDE 30 MILLILITER(S): 2 INJECTION INTRAMUSCULAR; INTRAVENOUS; SUBCUTANEOUS at 19:34

## 2019-01-20 RX ADMIN — Medication 10 UNIT(S): at 21:30

## 2019-01-20 RX ADMIN — Medication 137 MICROGRAM(S): at 05:08

## 2019-01-20 RX ADMIN — CHLORHEXIDINE GLUCONATE 15 MILLILITER(S): 213 SOLUTION TOPICAL at 05:07

## 2019-01-20 RX ADMIN — Medication 10 UNIT(S): at 14:31

## 2019-01-20 RX ADMIN — ALBUTEROL 1 PUFF(S): 90 AEROSOL, METERED ORAL at 22:19

## 2019-01-20 RX ADMIN — ALBUTEROL 1 PUFF(S): 90 AEROSOL, METERED ORAL at 10:37

## 2019-01-20 RX ADMIN — GABAPENTIN 300 MILLIGRAM(S): 400 CAPSULE ORAL at 05:07

## 2019-01-20 RX ADMIN — SODIUM CHLORIDE 3 MILLILITER(S): 9 INJECTION INTRAMUSCULAR; INTRAVENOUS; SUBCUTANEOUS at 10:35

## 2019-01-20 RX ADMIN — SIMVASTATIN 20 MILLIGRAM(S): 20 TABLET, FILM COATED ORAL at 21:33

## 2019-01-20 RX ADMIN — Medication 100 MILLIEQUIVALENT(S): at 05:05

## 2019-01-20 NOTE — PROGRESS NOTE ADULT - ASSESSMENT
63F w/ multiple respiratory comorbidities p/w SBO s/p ex-lap with findings of healthy bowel c/b evisceration of bowel s/p ex-lap with placement of retention sutures, now with worsening separation at skin edges requiring SICU level care including intubation for worsed respiratory status.    Plan  - Pain control continued  - NPO w/ tube feeds  - Monitor GI fxn   - Monitor respiratory status, wean as tolerated  - DVT ppx: Lovenox  - Follow care plan per SICU team/ Per family and patient's wishes, planning for transfer to [facility] is in progress     Zulema Wilkinson, PGY-1  [facility] General Surgery- B Team  [id number]

## 2019-01-20 NOTE — PROGRESS NOTE ADULT - SUBJECTIVE AND OBJECTIVE BOX
Interval/Overnight Events:  Family still requesting transfer, pending preauthorization from [facility]. MICU will accept patient, however no beds available. Remains on SICU service. Weaned from CPAP 10/5 to 8/5.    HPI: 63y Female with pmh COPD, DM, RA, hypothyroid, pulm HTN, CHRIS, HTN admitted on 12/15 for SBO, underwent exploratory laparotomy and adhesiolysis, no transition point was noted. pt transferred to SICU for intentional delayed extubation post op due to pulm comorbidities.   transferred to floors after extubation, eviscerated after having violent coughing episode, RTOR, returned to SICU for intentional delayed extubation. Pt transferred to RCU as developed viral multifocal pneumonia, returned 1/6 for worsening respiratory distress, intubated in SICU and remains intubated till date. Currently course c/b E faecium bacteremia on abx and ID following. Family requesting transfer out, pending presauthorization from [facility].     Allergies: No Known Allergies      MEDICATIONS:   --------------------------------------------------------------------------------------  Neurologic Medications  gabapentin   Solution 300 milliGRAM(s) Oral three times a day  HYDROmorphone PCA (1 mG/mL) 30 milliLiter(s) PCA Continuous PCA Continuous  HYDROmorphone PCA (1 mG/mL) Rescue Clinician Bolus 0.5 milliGRAM(s) IV Push every 15 minutes PRN for Pain Scale GREATER THAN 6  LORazepam     Tablet 0.5 milliGRAM(s) Oral two times a day PRN Anxiety  ondansetron Injectable 4 milliGRAM(s) IV Push every 6 hours PRN Nausea    Respiratory Medications  ALBUTerol    90 MICROgram(s) HFA Inhaler 1 Puff(s) Inhalation every 6 hours  buDESOnide 160 MICROgram(s)/formoterol 4.5 MICROgram(s) Inhaler 2 Puff(s) Inhalation two times a day  sodium chloride 3%  Inhalation 3 milliLiter(s) Inhalation two times a day    Cardiovascular Medications  amLODIPine   Tablet 10 milliGRAM(s) Oral daily  furosemide   Injectable 40 milliGRAM(s) IV Push daily  metoprolol tartrate 25 milliGRAM(s) Enteral Tube two times a day    Gastrointestinal Medications  pantoprazole   Suspension 40 milliGRAM(s) Oral daily    Genitourinary Medications    Hematologic/Oncologic Medications  enoxaparin Injectable 40 milliGRAM(s) SubCutaneous daily  influenza   Vaccine 0.5 milliLiter(s) IntraMuscular once    Antimicrobial/Immunologic Medications  piperacillin/tazobactam IVPB. 3.375 Gram(s) IV Intermittent every 8 hours  vancomycin  IVPB 1000 milliGRAM(s) IV Intermittent every 24 hours    Endocrine/Metabolic Medications  hydrocortisone sodium succinate Injectable 25 milliGRAM(s) IV Push every 12 hours  insulin glargine Injectable (LANTUS) 22 Unit(s) SubCutaneous at bedtime  insulin lispro (HumaLOG) corrective regimen sliding scale   SubCutaneous every 8 hours  insulin lispro Injectable (HumaLOG) 10 Unit(s) SubCutaneous every 8 hours  levothyroxine 137 MICROGram(s) Oral daily  simvastatin 20 milliGRAM(s) Oral at bedtime    Topical/Other Medications  chlorhexidine 0.12% Liquid 15 milliLiter(s) Swish and Spit two times a day  chlorhexidine 4% Liquid 1 Application(s) Topical <User Schedule>  collagenase Ointment 1 Application(s) Topical daily  naloxone Injectable 0.1 milliGRAM(s) IV Push every 3 minutes PRN For ANY of the following changes in patient status:  A. RR LESS THAN 10 breaths per minute, B. Oxygen saturation LESS THAN 90%, C. Sedation score of 6      EXAM  NEUROLOGY  RASS: 0-1  Exam: Normal, NAD, alert, oriented x3, no focal deficits    HEENT  Exam: Normocephalic, atraumatic*    RESPIRATORY  Exam: Lungs clear to auscultation, decreased breath sounds at bases, no wheezing  Mechanical Ventilation: Mode: CPAP with PS, FiO2: 40, PEEP: 8, PS: 10, MAP: 11, PIP: 19    CARDIOVASCULAR  Exam: S1, S2.  Regular rate and rhythm.    GI/NUTRITION  Exam: Abdomen soft, Non-tender, incision site with exudate  Current Diet:  NPO with tube feeds    VASCULAR  Exam: Extremities warm, pink, well-perfused.     MUSCULOSKELETAL  Exam: All extremities moving spontaneously without limitations. Pain to R knee      METABOLIC/FLUIDS/ELECTROLYTES      HEMATOLOGIC  [x] VTE Prophylaxis: enoxaparin Injectable 40 milliGRAM(s) SubCutaneous daily    Transfusions:	[] PRBC	[] Platelets		[] FFP	[] Cryoprecipitate    INFECTIOUS DISEASE  Antimicrobials/Immunologic Medications:  influenza   Vaccine 0.5 milliLiter(s) IntraMuscular once  piperacillin/tazobactam IVPB. 3.375 Gram(s) IV Intermittent every 8 hours  vancomycin  IVPB 1000 milliGRAM(s) IV Intermittent every 24 hours      Tubes/Lines/Drains    [x] Peripheral IV  [X] Central Venous Line     	[] R	[X] L	[X] IJ	[] Fem	[] SC	Date Placed:   [] Arterial Line		[] R	[] L	[] Fem	[] Rad	[] Ax	Date Placed:   [] PICC		[] Midline		[] Mediport  [] Urinary Catheter		Date Placed:   [x] Necessity of urinary, arterial, and venous catheters discussed      Assessment and Plan:   · Assessment		  63y Female hx of COPD presented initially with SBO, underwent ex lap 12/15 closed with 4 retention sutures, evicerated on 12/30 and closed with 6 more retention sutures, c/b multifocal pneumonia, developed hypercapnic respiratory failure requiring intubation 1/6, S/p bronch 1/9/18, improving and tolerating CPAP, still intubated, course further c/b Enterococci bacteremia no on vanco and zosyn, improving awaiting transfer to either [facility] MICU or [facility]    Neuro:  - continue ativan 0.5 mg PO PRN for discomfort/ anxiety  - ct PCA dilaudid, pt s/p ex lap with 10 retention sutures, still c/o pain  - c/w tizanidine   - C/w gabapentin 300mg tid    Resp:  - Patient with multifocal viral PNA, improving   - Intubated, on PS/CPAP mode, weaned down to PEEP 5 PS 10 FiO2 40%   - C/w inhalers, pulmonary toilet   - OOB to chair   - plan for extubation trial but family requested transfer out to [facility] hence delayed,   - appreciate pulm recs, Dr Lisker following, if not transferred to [facility], pt will be transferred to MICU when bed is available     CV:   - Continue amlodipine 10mg  - Metoprolol 25mg po BID   - Lasix 40 mg od IV, aim net negative negative 1-2 liters each day     GI:  - s/p 2*ex-lap, 10 retention sutures in situ, gaping wound covered in fibrinous exudate   - Daily wound check and dressing by RN  - c/w protonix daily, home med  - Patient on bolus tube feeds, tolerating well    Renal:  - BMP and I&O monitoring as routine   - iraheta removed, patient spontaneously voiding   - Repletion of electrolytes PRN    Heme:  - H/H stable   - VTE ppx w/ Lovenox and IPCs  - b/l LE venous duplex 1/14 no s/o DVT, calf veins not visualized well    ID:  - blood cultures 1/12 growing Enterococcus faecium, wound culture 1/12 growing gpc and gpr,  - lines: iraheta removed, rt IJV (1/6) changed to lt IJV 1/13,  ET 1/6  - TTE 1/12: good LV function, moderate pulm HTN, endocarditis can not be ruled out, needs MATEO if strongly suspected   - appreciate ID consult, ct abx  - repeat bcx 1/17 NGTD  - on abx vanco and zosyn since 1/12, per ID consider stopping zosyn today    Endo:  - hx RA on chronic prednisone, DM, hypothyroid  - Hydrocortisone 25 mg BID, do not titrate further down as this is nearly equivalent to home dose   - Synthroid 137mcg qhs through ngt   - Lantus 22 units q hs  - Humalog 10units TID AC (prior to bolus feeds) along with low dose corrective scale to cover  - Endo following, appreciate recs  - pt c/o right knee pain, with h/o RA on steroids and recent bacteremia, might need imaging studies if not improved     Lines:  -left IJ central line  -amaury feeding tube  -ET    Dispo:  -SICU, MICU/ [facility] discussion with family: tracheostomy recommended, family does not want to discuss it as pt gets scared.

## 2019-01-20 NOTE — PROGRESS NOTE ADULT - SUBJECTIVE AND OBJECTIVE BOX
Anesthesia Pain Management Service    SUBJECTIVE: Patient is doing well with IV PCA and no significant problems reported.    Pain Scale Score	At rest: ___ 	With Activity: ___ 	[X ] Refer to charted pain scores    THERAPY:    [ ] IV PCA Morphine		[ ] 5 mg/mL	[ ] 1 mg/mL  [X ] IV PCA Hydromorphone	[ ] 5 mg/mL	[X ] 1 mg/mL  [ ] IV PCA Fentanyl		[ ] 50 micrograms/mL    Demand dose __0.2_ lockout __6_ (minutes) Continuous Rate _0__ Total: 20.0___  Daily      MEDICATIONS  (STANDING):  ALBUTerol    90 MICROgram(s) HFA Inhaler 1 Puff(s) Inhalation every 6 hours  amLODIPine   Tablet 10 milliGRAM(s) Oral daily  buDESOnide 160 MICROgram(s)/formoterol 4.5 MICROgram(s) Inhaler 2 Puff(s) Inhalation two times a day  chlorhexidine 0.12% Liquid 15 milliLiter(s) Swish and Spit two times a day  chlorhexidine 4% Liquid 1 Application(s) Topical <User Schedule>  collagenase Ointment 1 Application(s) Topical daily  enoxaparin Injectable 40 milliGRAM(s) SubCutaneous daily  furosemide   Injectable 40 milliGRAM(s) IV Push daily  gabapentin   Solution 300 milliGRAM(s) Oral three times a day  hydrocortisone sodium succinate Injectable 25 milliGRAM(s) IV Push every 12 hours  HYDROmorphone PCA (1 mG/mL) 30 milliLiter(s) PCA Continuous PCA Continuous  influenza   Vaccine 0.5 milliLiter(s) IntraMuscular once  insulin glargine Injectable (LANTUS) 22 Unit(s) SubCutaneous at bedtime  insulin lispro (HumaLOG) corrective regimen sliding scale   SubCutaneous every 8 hours  insulin lispro Injectable (HumaLOG) 10 Unit(s) SubCutaneous every 8 hours  levothyroxine 137 MICROGram(s) Oral daily  metoprolol tartrate 25 milliGRAM(s) Enteral Tube two times a day  pantoprazole   Suspension 40 milliGRAM(s) Oral daily  simvastatin 20 milliGRAM(s) Oral at bedtime  sodium chloride 3%  Inhalation 3 milliLiter(s) Inhalation two times a day  vancomycin  IVPB 1000 milliGRAM(s) IV Intermittent every 24 hours    MEDICATIONS  (PRN):  HYDROmorphone PCA (1 mG/mL) Rescue Clinician Bolus 0.5 milliGRAM(s) IV Push every 15 minutes PRN for Pain Scale GREATER THAN 6  LORazepam     Tablet 0.5 milliGRAM(s) Oral two times a day PRN Anxiety  naloxone Injectable 0.1 milliGRAM(s) IV Push every 3 minutes PRN For ANY of the following changes in patient status:  A. RR LESS THAN 10 breaths per minute, B. Oxygen saturation LESS THAN 90%, C. Sedation score of 6  ondansetron Injectable 4 milliGRAM(s) IV Push every 6 hours PRN Nausea      OBJECTIVE:    Sedation Score:	[ X] Alert	[ ] Drowsy 	[ ] Arousable	[ ] Asleep	[ ] Unresponsive    Side Effects:	[X ] None	[ ] Nausea	[ ] Vomiting	[ ] Pruritus  		[ ] Other:    Vital Signs Last 24 Hrs  T(C): 37.1 (20 Jan 2019 04:00), Max: 37.5 (19 Jan 2019 16:00)  T(F): 98.7 (20 Jan 2019 04:00), Max: 99.5 (19 Jan 2019 16:00)  HR: 112 (20 Jan 2019 08:00) (88 - 112)  BP: 154/79 (20 Jan 2019 08:00) (102/55 - 158/80)  BP(mean): 84 (20 Jan 2019 08:00) (65 - 101)  RR: 22 (20 Jan 2019 08:00) (15 - 24)  SpO2: 98% (20 Jan 2019 07:00) (91% - 100%)    ASSESSMENT/ PLAN    Therapy to  be:	[ X] Continue   [ ] Discontinued   [ ] Change to prn Analgesics    Documentation and Verification of current medications:   [X] Done	[ ] Not done, not elligible    Comments:

## 2019-01-20 NOTE — PROGRESS NOTE ADULT - SUBJECTIVE AND OBJECTIVE BOX
General Surgery Progress Note    SUBJECTIVE:  The patient was seen and examined. No acute events overnight. Weaned from CPAP from 10/5 to 8/5 o/n, now 5/5. Still requesting transfer, pending preauthorization from [facility].     OBJECTIVE:     ** VITAL SIGNS / I&O's **    Vital Signs Last 24 Hrs  T(C): 37.1 (20 Jan 2019 04:00), Max: 37.5 (19 Jan 2019 16:00)  T(F): 98.7 (20 Jan 2019 04:00), Max: 99.5 (19 Jan 2019 16:00)  HR: 118 (20 Jan 2019 09:39) (93 - 118)  BP: 145/63 (20 Jan 2019 09:00) (107/54 - 158/80)  BP(mean): 83 (20 Jan 2019 09:00) (66 - 101)  RR: 25 (20 Jan 2019 09:39) (15 - 25)  SpO2: 84% (20 Jan 2019 09:39) (84% - 100%)  Mode: CPAP with PS  FiO2: 40  PEEP: 5  PS: 8  MAP: 8  PIP: 16      19 Jan 2019 07:01  -  20 Jan 2019 07:00  --------------------------------------------------------  IN:    Enteral Tube Flush: 400 mL    Glucerna: 1200 mL    IV PiggyBack: 300 mL  Total IN: 1900 mL    OUT:    Rectal Tube: 450 mL    Voided: 2000 mL  Total OUT: 2450 mL    Total NET: -550 mL          ** PHYSICAL EXAM **    -- CONSTITUTIONAL: Alert, NAD  -- PULMONARY: non-labored respirations  -- ABDOMEN: soft, non-distended, non-tender, retention sutures in place, dressing c/d/i  -- NEURO: A&Ox3    ** LABS **                          8.6    9.44  )-----------( 406      ( 20 Jan 2019 02:50 )             28.8     20 Jan 2019 02:50    144    |  101    |  16     ----------------------------<  121    3.9     |  31     |  0.74     Ca    9.1        20 Jan 2019 02:50  Phos  3.5       20 Jan 2019 02:50  Mg     2.1       20 Jan 2019 02:50        CAPILLARY BLOOD GLUCOSE      POCT Blood Glucose.: 116 mg/dL (20 Jan 2019 05:00)  POCT Blood Glucose.: 215 mg/dL (19 Jan 2019 22:24)  POCT Blood Glucose.: 161 mg/dL (19 Jan 2019 13:48)                MEDICATIONS  (STANDING):  ALBUTerol    90 MICROgram(s) HFA Inhaler 1 Puff(s) Inhalation every 6 hours  amLODIPine   Tablet 10 milliGRAM(s) Oral daily  buDESOnide 160 MICROgram(s)/formoterol 4.5 MICROgram(s) Inhaler 2 Puff(s) Inhalation two times a day  chlorhexidine 0.12% Liquid 15 milliLiter(s) Swish and Spit two times a day  chlorhexidine 4% Liquid 1 Application(s) Topical <User Schedule>  collagenase Ointment 1 Application(s) Topical daily  enoxaparin Injectable 40 milliGRAM(s) SubCutaneous daily  furosemide   Injectable 40 milliGRAM(s) IV Push daily  gabapentin   Solution 300 milliGRAM(s) Oral three times a day  hydrocortisone sodium succinate Injectable 25 milliGRAM(s) IV Push every 12 hours  HYDROmorphone PCA (1 mG/mL) 30 milliLiter(s) PCA Continuous PCA Continuous  influenza   Vaccine 0.5 milliLiter(s) IntraMuscular once  insulin glargine Injectable (LANTUS) 22 Unit(s) SubCutaneous at bedtime  insulin lispro (HumaLOG) corrective regimen sliding scale   SubCutaneous every 8 hours  insulin lispro Injectable (HumaLOG) 10 Unit(s) SubCutaneous every 8 hours  levothyroxine 137 MICROGram(s) Oral daily  metoprolol tartrate 25 milliGRAM(s) Enteral Tube two times a day  pantoprazole   Suspension 40 milliGRAM(s) Oral daily  simvastatin 20 milliGRAM(s) Oral at bedtime  sodium chloride 3%  Inhalation 3 milliLiter(s) Inhalation two times a day  vancomycin  IVPB 1000 milliGRAM(s) IV Intermittent every 24 hours    MEDICATIONS  (PRN):  HYDROmorphone PCA (1 mG/mL) Rescue Clinician Bolus 0.5 milliGRAM(s) IV Push every 15 minutes PRN for Pain Scale GREATER THAN 6  LORazepam     Tablet 0.5 milliGRAM(s) Oral two times a day PRN Anxiety  naloxone Injectable 0.1 milliGRAM(s) IV Push every 3 minutes PRN For ANY of the following changes in patient status:  A. RR LESS THAN 10 breaths per minute, B. Oxygen saturation LESS THAN 90%, C. Sedation score of 6  ondansetron Injectable 4 milliGRAM(s) IV Push every 6 hours PRN Nausea

## 2019-01-20 NOTE — PROGRESS NOTE ADULT - ATTENDING COMMENTS
I saw and examined the patient and agree with the above note.    Ryan Bear MD (Cell: [phone])  Acute and Critical Care Surgery    The Acute Care Surgery (B Team) Attending Group Practice:  Dr. Cole Nicole, Dr. Julio Koch, Dr. Divya Monahan, Dr. Ryan Bear    Urgent issues - spectra [phone] or [phone]  Nonurgent issues - [phone]  Patient appointments or after hours - [phone]

## 2019-01-20 NOTE — PROGRESS NOTE ADULT - ATTENDING COMMENTS
The patient is a critical care patient with hemodynamic and metabolic instability in SICU.  I have personally interviewed and examined this patient, reviewed labs and x-rays, discussed with other consultants, House staff and PA's.  I spent   35  minutes  in total providing critical care for the diagnoses, assessment and plan above.  These diagnoses are unrelated to the surgical procedure noted above.  Time involved in performance of separately billable procedures was not counted toward my critical care time.  There is no overlap.    Current Issues:  J96.91 Respiratory failure with hypoxia and hypercapnia, unspecified chronicity   - brought PS down further this AM, from 8 to 5, with PEEP 5 and FiO2 of 40.  However several hours later patient became tachypneic to 40's and desat'ed to low 80;s.  Brought PS back to 8 and FiO2 to 60 with good effect.    - I again recommended a trach to the patient and her son, but she became very anxious and I was asked by her son not to discuss the issue further today.    - Possible transfer pending to [facility], but I am not certain how likely this is to occur.  Reportedly will have an answer from her insurance in coming days.  J18.9 Multifocal pneumonia   - As per ID recs, will d/c zosyn today.  patient not behaving septic  T81.30XA Wound dehiscence   - wound care as per primary team  E11.9 Type 2 diabetes mellitus without complication, unspecified whether long term insulin use   - basal/bolus, controlled  E03.9 Hypothyroidism, unspecified type   - on synthroid  M06.9 Rheumatoid arthritis, involving unspecified site, unspecified rheumatoid factor presence   - on chronic steroids  D64.9 Anemia, unspecified type   - no signs of clinically significant bleeding on exam  Z91.89 At risk for malnutrition   - on TFs The patient is a critical care patient with hemodynamic and metabolic instability in SICU.  I have personally interviewed and examined this patient, reviewed labs and x-rays, discussed with other consultants, House staff and PA's.  I spent   35  minutes  in total providing critical care for the diagnoses, assessment and plan above.  These diagnoses are unrelated to the surgical procedure noted above.  Time involved in performance of separately billable procedures was not counted toward my critical care time.  There is no overlap.    Current Issues:  J96.91 Respiratory failure with hypoxia and hypercapnia, unspecified chronicity   - brought PS down further this AM, from 8 to 5, with PEEP 5 and FiO2 of 40.  However several hours later patient became tachypneic to 40's and desat'ed to low 80;s.  Brought PS back to 8 and FiO2 to 60 with good effect.    - I again recommended a trach to the patient and her son, but she became very anxious and I was asked by her son not to discuss the issue further today.    - Possible transfer pending to [facility], but I am not certain how likely this is to occur.  Reportedly will have an answer from her insurance in coming days.  J18.9 Multifocal pneumonia   - As per ID recs, will d/c zosyn today.  patient not behaving septic  T81.30XA Wound dehiscence   - wound care as per primary team  E11.9 Type 2 diabetes mellitus without complication, unspecified whether long term insulin use   - basal/bolus, controlled  E03.9 Hypothyroidism, unspecified type   - on synthroid  M06.9 Rheumatoid arthritis, involving unspecified site, unspecified rheumatoid factor presence   - on chronic steroids  D64.9 Anemia, unspecified type   - no signs of clinically significant bleeding on exam  Z91.89 At risk for malnutrition   - on TFs    SICU day attending 1-21-19    Seen and examined, chart and note reviewed, Case discussed with SICU and B team residents and attendings    Respiratory failure  a.  On  CPAP/PSV 5/5 with saturations in the low 90's.  Appears comfortable.  Still with thick copious secretions  b.  Will put patient back on PSV 12/5  c.  Continue with lasix 40mg IV.  Re-evaluate response this pm  d.  Discuss with family prognosis, treatment plan  e.  Consult with MICU    E. Faecalis bactermia  a.  Off zosyn, continue  vancomycin    At risk for malnutrition  a.  Tube feeds to goal    Anxiety  a.  Ativan po with IV prn  b.  Start seroquel    DM, uncontrolled  a.  Increase Lantus  b.  Switch to pred 10mg q 24    Spent 75 minutes in critical care

## 2019-01-21 LAB
ANION GAP SERPL CALC-SCNC: 14 MMO/L — SIGNIFICANT CHANGE UP (ref 7–14)
BASE EXCESS BLDV CALC-SCNC: 8.6 MMOL/L — SIGNIFICANT CHANGE UP
BLOOD GAS VENOUS - CREATININE: 0.47 MG/DL — LOW (ref 0.5–1.3)
BUN SERPL-MCNC: 17 MG/DL — SIGNIFICANT CHANGE UP (ref 7–23)
CALCIUM SERPL-MCNC: 9.4 MG/DL — SIGNIFICANT CHANGE UP (ref 8.4–10.5)
CHLORIDE BLDV-SCNC: 104 MMOL/L — SIGNIFICANT CHANGE UP (ref 96–108)
CHLORIDE SERPL-SCNC: 102 MMOL/L — SIGNIFICANT CHANGE UP (ref 98–107)
CO2 SERPL-SCNC: 30 MMOL/L — SIGNIFICANT CHANGE UP (ref 22–31)
CREAT SERPL-MCNC: 0.68 MG/DL — SIGNIFICANT CHANGE UP (ref 0.5–1.3)
GAS PNL BLDV: 145 MMOL/L — SIGNIFICANT CHANGE UP (ref 136–146)
GLUCOSE BLDV-MCNC: 180 — HIGH (ref 70–99)
GLUCOSE SERPL-MCNC: 186 MG/DL — HIGH (ref 70–99)
HCO3 BLDV-SCNC: 31 MMOL/L — HIGH (ref 20–27)
HCT VFR BLD CALC: 29.6 % — LOW (ref 34.5–45)
HCT VFR BLDV CALC: 27.6 % — LOW (ref 34.5–45)
HGB BLD-MCNC: 8.9 G/DL — LOW (ref 11.5–15.5)
HGB BLDV-MCNC: 8.9 G/DL — LOW (ref 11.5–15.5)
LACTATE BLDV-MCNC: 2.3 MMOL/L — HIGH (ref 0.5–2)
MAGNESIUM SERPL-MCNC: 2.2 MG/DL — SIGNIFICANT CHANGE UP (ref 1.6–2.6)
MCHC RBC-ENTMCNC: 30.1 % — LOW (ref 32–36)
MCHC RBC-ENTMCNC: 31.1 PG — SIGNIFICANT CHANGE UP (ref 27–34)
MCV RBC AUTO: 103.5 FL — HIGH (ref 80–100)
NRBC # FLD: 0.33 K/UL — LOW (ref 25–125)
NRBC FLD-RTO: 3.3 — SIGNIFICANT CHANGE UP
PCO2 BLDV: 62 MMHG — HIGH (ref 41–51)
PH BLDV: 7.36 PH — SIGNIFICANT CHANGE UP (ref 7.32–7.43)
PHOSPHATE SERPL-MCNC: 2.8 MG/DL — SIGNIFICANT CHANGE UP (ref 2.5–4.5)
PLATELET # BLD AUTO: 419 K/UL — HIGH (ref 150–400)
PMV BLD: 9.2 FL — SIGNIFICANT CHANGE UP (ref 7–13)
PO2 BLDV: 39 MMHG — SIGNIFICANT CHANGE UP (ref 35–40)
POTASSIUM BLDV-SCNC: 4 MMOL/L — SIGNIFICANT CHANGE UP (ref 3.4–4.5)
POTASSIUM SERPL-MCNC: 4.1 MMOL/L — SIGNIFICANT CHANGE UP (ref 3.5–5.3)
POTASSIUM SERPL-SCNC: 4.1 MMOL/L — SIGNIFICANT CHANGE UP (ref 3.5–5.3)
RBC # BLD: 2.86 M/UL — LOW (ref 3.8–5.2)
RBC # FLD: 17 % — HIGH (ref 10.3–14.5)
SAO2 % BLDV: 65.1 % — SIGNIFICANT CHANGE UP (ref 60–85)
SODIUM SERPL-SCNC: 146 MMOL/L — HIGH (ref 135–145)
WBC # BLD: 10.07 K/UL — SIGNIFICANT CHANGE UP (ref 3.8–10.5)
WBC # FLD AUTO: 10.07 K/UL — SIGNIFICANT CHANGE UP (ref 3.8–10.5)

## 2019-01-21 PROCEDURE — 71045 X-RAY EXAM CHEST 1 VIEW: CPT | Mod: 26

## 2019-01-21 PROCEDURE — 99292 CRITICAL CARE ADDL 30 MIN: CPT

## 2019-01-21 PROCEDURE — 99291 CRITICAL CARE FIRST HOUR: CPT

## 2019-01-21 RX ORDER — QUETIAPINE FUMARATE 200 MG/1
100 TABLET, FILM COATED ORAL AT BEDTIME
Qty: 0 | Refills: 0 | Status: DISCONTINUED | OUTPATIENT
Start: 2019-01-21 | End: 2019-01-22

## 2019-01-21 RX ORDER — INSULIN GLARGINE 100 [IU]/ML
26 INJECTION, SOLUTION SUBCUTANEOUS AT BEDTIME
Qty: 0 | Refills: 0 | Status: DISCONTINUED | OUTPATIENT
Start: 2019-01-21 | End: 2019-01-22

## 2019-01-21 RX ORDER — FUROSEMIDE 40 MG
20 TABLET ORAL ONCE
Qty: 0 | Refills: 0 | Status: COMPLETED | OUTPATIENT
Start: 2019-01-21 | End: 2019-01-21

## 2019-01-21 RX ORDER — IBUPROFEN 200 MG
400 TABLET ORAL ONCE
Qty: 0 | Refills: 0 | Status: COMPLETED | OUTPATIENT
Start: 2019-01-21 | End: 2019-01-21

## 2019-01-21 RX ADMIN — Medication 25 MILLIGRAM(S): at 05:07

## 2019-01-21 RX ADMIN — Medication 400 MILLIGRAM(S): at 01:45

## 2019-01-21 RX ADMIN — GABAPENTIN 400 MILLIGRAM(S): 400 CAPSULE ORAL at 22:31

## 2019-01-21 RX ADMIN — ENOXAPARIN SODIUM 40 MILLIGRAM(S): 100 INJECTION SUBCUTANEOUS at 12:54

## 2019-01-21 RX ADMIN — QUETIAPINE FUMARATE 100 MILLIGRAM(S): 200 TABLET, FILM COATED ORAL at 22:33

## 2019-01-21 RX ADMIN — Medication 10 UNIT(S): at 06:21

## 2019-01-21 RX ADMIN — Medication 1: at 14:14

## 2019-01-21 RX ADMIN — ALBUTEROL 1 PUFF(S): 90 AEROSOL, METERED ORAL at 09:55

## 2019-01-21 RX ADMIN — BUDESONIDE AND FORMOTEROL FUMARATE DIHYDRATE 2 PUFF(S): 160; 4.5 AEROSOL RESPIRATORY (INHALATION) at 22:58

## 2019-01-21 RX ADMIN — SODIUM CHLORIDE 3 MILLILITER(S): 9 INJECTION INTRAMUSCULAR; INTRAVENOUS; SUBCUTANEOUS at 23:01

## 2019-01-21 RX ADMIN — SODIUM CHLORIDE 3 MILLILITER(S): 9 INJECTION INTRAMUSCULAR; INTRAVENOUS; SUBCUTANEOUS at 09:52

## 2019-01-21 RX ADMIN — HYDROMORPHONE HYDROCHLORIDE 30 MILLILITER(S): 2 INJECTION INTRAMUSCULAR; INTRAVENOUS; SUBCUTANEOUS at 07:06

## 2019-01-21 RX ADMIN — ALBUTEROL 1 PUFF(S): 90 AEROSOL, METERED ORAL at 03:30

## 2019-01-21 RX ADMIN — ALBUTEROL 1 PUFF(S): 90 AEROSOL, METERED ORAL at 16:32

## 2019-01-21 RX ADMIN — Medication 250 MILLIGRAM(S): at 05:14

## 2019-01-21 RX ADMIN — Medication 137 MICROGRAM(S): at 05:07

## 2019-01-21 RX ADMIN — HYDROMORPHONE HYDROCHLORIDE 30 MILLILITER(S): 2 INJECTION INTRAMUSCULAR; INTRAVENOUS; SUBCUTANEOUS at 06:26

## 2019-01-21 RX ADMIN — Medication 0.5 MILLIGRAM(S): at 20:28

## 2019-01-21 RX ADMIN — Medication 10 UNIT(S): at 22:32

## 2019-01-21 RX ADMIN — Medication 25 MILLIGRAM(S): at 18:04

## 2019-01-21 RX ADMIN — CHLORHEXIDINE GLUCONATE 15 MILLILITER(S): 213 SOLUTION TOPICAL at 18:04

## 2019-01-21 RX ADMIN — Medication 1: at 22:32

## 2019-01-21 RX ADMIN — AMLODIPINE BESYLATE 10 MILLIGRAM(S): 2.5 TABLET ORAL at 05:07

## 2019-01-21 RX ADMIN — CHLORHEXIDINE GLUCONATE 1 APPLICATION(S): 213 SOLUTION TOPICAL at 12:54

## 2019-01-21 RX ADMIN — Medication 10 UNIT(S): at 14:15

## 2019-01-21 RX ADMIN — Medication 40 MILLIGRAM(S): at 05:08

## 2019-01-21 RX ADMIN — Medication 1: at 06:21

## 2019-01-21 RX ADMIN — HYDROMORPHONE HYDROCHLORIDE 30 MILLILITER(S): 2 INJECTION INTRAMUSCULAR; INTRAVENOUS; SUBCUTANEOUS at 18:51

## 2019-01-21 RX ADMIN — Medication 10 MILLIGRAM(S): at 14:31

## 2019-01-21 RX ADMIN — CHLORHEXIDINE GLUCONATE 15 MILLILITER(S): 213 SOLUTION TOPICAL at 05:07

## 2019-01-21 RX ADMIN — Medication 0.5 MILLIGRAM(S): at 12:53

## 2019-01-21 RX ADMIN — PANTOPRAZOLE SODIUM 40 MILLIGRAM(S): 20 TABLET, DELAYED RELEASE ORAL at 12:53

## 2019-01-21 RX ADMIN — Medication 400 MILLIGRAM(S): at 01:16

## 2019-01-21 RX ADMIN — ALBUTEROL 1 PUFF(S): 90 AEROSOL, METERED ORAL at 22:57

## 2019-01-21 RX ADMIN — GABAPENTIN 400 MILLIGRAM(S): 400 CAPSULE ORAL at 14:13

## 2019-01-21 RX ADMIN — Medication 1 APPLICATION(S): at 08:00

## 2019-01-21 RX ADMIN — Medication 20 MILLIGRAM(S): at 18:04

## 2019-01-21 RX ADMIN — HYDROMORPHONE HYDROCHLORIDE 0.5 MILLIGRAM(S): 2 INJECTION INTRAMUSCULAR; INTRAVENOUS; SUBCUTANEOUS at 17:08

## 2019-01-21 RX ADMIN — Medication 0.5 MILLIGRAM(S): at 07:47

## 2019-01-21 RX ADMIN — SIMVASTATIN 20 MILLIGRAM(S): 20 TABLET, FILM COATED ORAL at 22:32

## 2019-01-21 RX ADMIN — BUDESONIDE AND FORMOTEROL FUMARATE DIHYDRATE 2 PUFF(S): 160; 4.5 AEROSOL RESPIRATORY (INHALATION) at 09:54

## 2019-01-21 RX ADMIN — GABAPENTIN 400 MILLIGRAM(S): 400 CAPSULE ORAL at 05:08

## 2019-01-21 RX ADMIN — INSULIN GLARGINE 26 UNIT(S): 100 INJECTION, SOLUTION SUBCUTANEOUS at 22:32

## 2019-01-21 NOTE — PROGRESS NOTE ADULT - ASSESSMENT
63F w/ multiple respiratory comorbidities p/w SBO s/p ex-lap with findings of healthy bowel c/b evisceration of bowel s/p ex-lap with placement of retention sutures, now with worsening separation at skin edges requiring SICU level care including intubation for worsed respiratory status.    Plan  - Pain control: gabapentin 400mg via NGT 3x daily, dilaudid PCA  - NPO w/ tube feeds  - Monitor GI fxn   - Monitor respiratory status, wean as tolerated  - DVT ppx: Lovenox  - er family and patient's wishes, planning for transfer to [facility] is in progress  - Follow care plan per SICU team

## 2019-01-21 NOTE — PROGRESS NOTE ADULT - ATTENDING COMMENTS
The patient is a critical care patient with hemodynamic and metabolic instability in SICU.  I have personally interviewed and examined this patient, reviewed labs and x-rays, discussed with other consultants, House staff and PA's.  I spent   35  minutes  in total providing critical care for the diagnoses, assessment and plan above.  These diagnoses are unrelated to the surgical procedure noted above.  Time involved in performance of separately billable procedures was not counted toward my critical care time.  There is no overlap.    Current Issues:  J96.91 Respiratory failure with hypoxia and hypercapnia, unspecified chronicity   - brought PS down further this AM, from 8 to 5, with PEEP 5 and FiO2 of 40.  However several hours later patient became tachypneic to 40's and desat'ed to low 80;s.  Brought PS back to 8 and FiO2 to 60 with good effect.    - I again recommended a trach to the patient and her son, but she became very anxious and I was asked by her son not to discuss the issue further today.    - Possible transfer pending to [facility], but I am not certain how likely this is to occur.  Reportedly will have an answer from her insurance in coming days.  J18.9 Multifocal pneumonia   - As per ID recs, will d/c zosyn today.  patient not behaving septic  T81.30XA Wound dehiscence   - wound care as per primary team  E11.9 Type 2 diabetes mellitus without complication, unspecified whether long term insulin use   - basal/bolus, controlled  E03.9 Hypothyroidism, unspecified type   - on synthroid  M06.9 Rheumatoid arthritis, involving unspecified site, unspecified rheumatoid factor presence   - on chronic steroids  D64.9 Anemia, unspecified type   - no signs of clinically significant bleeding on exam  Z91.89 At risk for malnutrition   - on TFs The patient is a critical care patient with hemodynamic and metabolic instability in SICU.  I have personally interviewed and examined this patient, reviewed labs and x-rays, discussed with other consultants, House staff and PA's.  I spent   35  minutes  in total providing critical care for the diagnoses, assessment and plan above.  These diagnoses are unrelated to the surgical procedure noted above.  Time involved in performance of separately billable procedures was not counted toward my critical care time.  There is no overlap.    Current Issues:  J96.91 Respiratory failure with hypoxia and hypercapnia, unspecified chronicity   - brought PS down further this AM, from 8 to 5, with PEEP 5 and FiO2 of 40.  However several hours later patient became tachypneic to 40's and desat'ed to low 80;s.  Brought PS back to 8 and FiO2 to 60 with good effect.    - I again recommended a trach to the patient and her son, but she became very anxious and I was asked by her son not to discuss the issue further today.    - Possible transfer pending to [facility], but I am not certain how likely this is to occur.  Reportedly will have an answer from her insurance in coming days.  J18.9 Multifocal pneumonia   - As per ID recs, will d/c zosyn today.  patient not behaving septic  T81.30XA Wound dehiscence   - wound care as per primary team  E11.9 Type 2 diabetes mellitus without complication, unspecified whether long term insulin use   - basal/bolus, controlled  E03.9 Hypothyroidism, unspecified type   - on synthroid  M06.9 Rheumatoid arthritis, involving unspecified site, unspecified rheumatoid factor presence   - on chronic steroids  D64.9 Anemia, unspecified type   - no signs of clinically significant bleeding on exam  Z91.89 At risk for malnutrition   - on TFs    SICU day attending 1-21-19    Seen and examined, chart and note reviewed, Case discussed with SICU and B team residents and attendings    Respiratory failure  a.  On  CPAP/PSV 5/5 with saturations in the low 90's.  Appears comfortable.  Still with thick copious secretions  b.  Will put patient back on PSV 12/5  c.  Continue with lasix 40mg IV.  Re-evaluate response this pm  d.  Discuss with family prognosis, treatment plan  e.  Consult with MICU    E. Faecalis bactermia  a.  Off zosyn, continue  vancomycin    At risk for malnutrition  a.  Tube feeds to goal    Anxiety  a.  Ativan po with IV prn  b.  Start seroquel    DM, uncontrolled  a.  Increase Lantus  b.  Switch to pred 10mg q 24    Spent 75 minutes in critical care

## 2019-01-21 NOTE — PROGRESS NOTE ADULT - ATTENDING COMMENTS
I saw and examined the patient and agree with the above note.    Ryan Bear MD (Cell: [phone])  Acute and Critical Care Surgery    The Acute Care Surgery (B Team) Attending Group Practice:  Dr. Cole Nicole, Dr. Julio Koch, Dr. Divya Monahan, Dr. Ryan Bear    Urgent issues - spectra [phone] or [phone]  Nonurgent issues - [phone]  Patient appointments or after hours - [phone]

## 2019-01-21 NOTE — PROGRESS NOTE ADULT - SUBJECTIVE AND OBJECTIVE BOX
B-Team Surgery Progress Note     Subjective/24hour Events: CPAP wean attempted and patient desaturated to 80s. Vent settings increased. Zosyn abx discontinued. Gabapentin increased. Pain controlled. Currently on tube feeds.    Vital Signs:  Vital Signs Last 24 Hrs  T(C): 37.6 (21 Jan 2019 04:00), Max: 37.7 (21 Jan 2019 00:00)  T(F): 99.6 (21 Jan 2019 04:00), Max: 99.8 (21 Jan 2019 00:00)  HR: 103 (21 Jan 2019 08:00) (87 - 122)  BP: 133/83 (21 Jan 2019 08:00) (97/63 - 159/85)  BP(mean): 94 (21 Jan 2019 08:00) (67 - 120)  RR: 18 (21 Jan 2019 08:00) (16 - 32)  SpO2: 91% (21 Jan 2019 08:00) (84% - 100%)    CAPILLARY BLOOD GLUCOSE      POCT Blood Glucose.: 179 mg/dL (21 Jan 2019 06:18)  POCT Blood Glucose.: 176 mg/dL (20 Jan 2019 21:25)  POCT Blood Glucose.: 238 mg/dL (20 Jan 2019 14:24)      I&O's Detail    20 Jan 2019 07:01  -  21 Jan 2019 07:00  --------------------------------------------------------  IN:    Enteral Tube Flush: 200 mL    Glucerna: 1200 mL    IV PiggyBack: 250 mL  Total IN: 1650 mL    OUT:    Rectal Tube: 400 mL    Voided: 1650 mL  Total OUT: 2050 mL    Total NET: -400 mL      21 Jan 2019 07:01  -  21 Jan 2019 09:37  --------------------------------------------------------  IN:  Total IN: 0 mL    OUT:    Voided: 250 mL  Total OUT: 250 mL    Total NET: -250 mL            MEDICATIONS  (STANDING):  ALBUTerol    90 MICROgram(s) HFA Inhaler 1 Puff(s) Inhalation every 6 hours  amLODIPine   Tablet 10 milliGRAM(s) Oral daily  buDESOnide 160 MICROgram(s)/formoterol 4.5 MICROgram(s) Inhaler 2 Puff(s) Inhalation two times a day  chlorhexidine 0.12% Liquid 15 milliLiter(s) Swish and Spit two times a day  chlorhexidine 4% Liquid 1 Application(s) Topical <User Schedule>  collagenase Ointment 1 Application(s) Topical daily  enoxaparin Injectable 40 milliGRAM(s) SubCutaneous daily  furosemide   Injectable 40 milliGRAM(s) IV Push daily  gabapentin   Solution 400 milliGRAM(s) Oral three times a day  HYDROmorphone PCA (1 mG/mL) 30 milliLiter(s) PCA Continuous PCA Continuous  influenza   Vaccine 0.5 milliLiter(s) IntraMuscular once  insulin glargine Injectable (LANTUS) 26 Unit(s) SubCutaneous at bedtime  insulin lispro (HumaLOG) corrective regimen sliding scale   SubCutaneous every 8 hours  insulin lispro Injectable (HumaLOG) 10 Unit(s) SubCutaneous every 8 hours  levothyroxine 137 MICROGram(s) Oral daily  metoprolol tartrate 25 milliGRAM(s) Enteral Tube two times a day  pantoprazole   Suspension 40 milliGRAM(s) Oral daily  predniSONE   Tablet 10 milliGRAM(s) Oral daily  simvastatin 20 milliGRAM(s) Oral at bedtime  sodium chloride 3%  Inhalation 3 milliLiter(s) Inhalation two times a day    MEDICATIONS  (PRN):  HYDROmorphone PCA (1 mG/mL) Rescue Clinician Bolus 0.5 milliGRAM(s) IV Push every 15 minutes PRN for Pain Scale GREATER THAN 6  LORazepam     Tablet 0.5 milliGRAM(s) Oral two times a day PRN Anxiety  naloxone Injectable 0.1 milliGRAM(s) IV Push every 3 minutes PRN For ANY of the following changes in patient status:  A. RR LESS THAN 10 breaths per minute, B. Oxygen saturation LESS THAN 90%, C. Sedation score of 6  ondansetron Injectable 4 milliGRAM(s) IV Push every 6 hours PRN Nausea        Physical Exam:  Gen: NAD  LS: nml respiratory effort  Card: pulse regularly present  GI: soft, non-distended, non-tender, retention sutures in place, dressing c/d/i  Ext: warm      Labs:    01-21    146<H>  |  102  |  17  ----------------------------<  186<H>  4.1   |  30  |  0.68    Ca    9.4      21 Jan 2019 03:00  Phos  2.8     01-21  Mg     2.2     01-21                              8.9    10.07 )-----------( 419      ( 21 Jan 2019 03:00 )             29.6               Imaging:  EXAM:  XR CHEST PORTABLE URGENT 1V      PROCEDURE DATE:  Jan 20 2019     INTERPRETATION:    A single chest x-ray was obtained on January 26, 2019.    Indication: Shortness of breath.    Impression:    The heart is slightly enlarged. Changes the lungs compatible with   pulmonary edema. Left lower lobe pneumonia cannot be ruled out as well.   All life supporting devices are in good position and unchanged when   compared to previous study done on January 18, 2019.

## 2019-01-21 NOTE — PROGRESS NOTE ADULT - ASSESSMENT
63y Female hx of COPD presented initially with SBO, underwent ex lap 12/15 closed with 4 retention sutures, evicerated on 12/30 and closed with 6 more retention sutures, c/b multifocal pneumonia, developed hypercapnic respiratory failure requiring intubation 1/6, S/p bronch 1/9/18, improving and tolerating CPAP, still intubated, course further c/b Enterococci bacteremia no on vanco and zosyn, improving awaiting transfer to either [facility] MICU or [facility]    Neuro:  - continue ativan 0.5 mg PO PRN for discomfort/ anxiety  - ct PCA dilaudid, pt s/p ex lap with 10 retention sutures, still c/o pain   - increase gabapentin to 400mg tid    Resp:  - Intubated, continue to decrease pressure support as tolerated   - C/w inhalers, pulmonary toilet   - OOB to chair   - appreciate pulm recs, Dr Lisker following, if not transferred to [facility], pt will be transferred to MICU when bed is available     CV:   - Continue amlodipine 10mg  - Metoprolol 25mg po BID   - Lasix 40 mg IV    GI:  - s/p 2*ex-lap, 10 retention sutures in situ, gaping wound covered in fibrinous exudate   - santyl to wound daily  - c/w protonix daily, home med  - Patient on bolus tube feeds, tolerating well    Renal:  - BMP and I&O monitoring as routine   - iraheta removed, patient spontaneously voiding   - Repletion of electrolytes PRN    Heme:  - H/H stable   - VTE ppx w/ Lovenox and IPCs    ID:  - blood cultures 1/12 growing Enterococcus faecium- continue zosyn  - lines: iraheta removed, rt IJV (1/6) changed to lt IJV 1/13,  ET 1/6  - appreciate ID recs, zosyn D/c    Endo:  - hx RA on chronic prednisone, DM, hypothyroid  - Hydrocortisone 25 mg BID, do not titrate further down as this is nearly equivalent to home dose   - Synthroid 137mcg qhs through ngt   - Lantus 22 units q hs  - Humalog 10units TID AC (prior to bolus feeds) along with low dose corrective scale to cover  - Endo following, appreciate recs    Lines:  -left IJ central line  -amaury feeding tube  -ET    Dispo:  -SICU, MICU/ [facility] 63y Female hx of COPD presented initially with SBO, underwent ex lap 12/15 closed with 4 retention sutures, evicerated on 12/30 and closed with 6 more retention sutures, c/b multifocal pneumonia, developed hypercapnic respiratory failure requiring intubation 1/6, S/p bronch 1/9/18, improving and tolerating CPAP, still intubated, course further c/b Enterococci bacteremia no on vanco and zosyn, improving awaiting transfer to either [facility] MICU or [facility]    Neuro:  - continue ativan 0.5 mg PO PRN for discomfort/ anxiety  - ct PCA dilaudid, pt s/p ex lap with 10 retention sutures, still c/o pain   - ct gabapentin to 400mg tid    Resp:  - Intubated, continue to decrease pressure support as tolerated, currently on 5/5 support, trial to wean off FiO2 failed    - C/w inhalers, aggressive pulmonary toilet   - OOB to chair   - appreciate pulm samuel, Dr Lisker following, if not transferred to [facility], pt will be transferred to MICU when bed is available     CV:   - Continue amlodipine 10mg  - Metoprolol 25mg po BID   - Lasix 40 mg IV od, diurese to keep net negative 1-2 liters everyday    GI:  - s/p 2*ex-lap, 10 retention sutures in situ, gaping wound covered in fibrinous exudate   - santyl to wound daily  - c/w protonix daily, home med  - Patient on bolus tube feeds, tolerating well    Renal:  - BMP and I&O monitoring as routine, stable BMP   - iraheta removed, patient spontaneously voiding   - Repletion of electrolytes PRN    Heme:  - H/H stable   - VTE ppx w/ Lovenox and IPCs    ID:  - blood cultures 1/12 growing Enterococcus faecium, repeat blood culture 1/17 NGTD  - lines: iraheta removed, rt IJV (1/6) changed to lt IJV 1/13,  ET 1/6  - appreciate ID recs, zosyn D/c, ct vanco     Endo:  - hx RA on chronic prednisone, DM, hypothyroid  - Hydrocortisone 25 mg BID, do not titrate further down as this is nearly equivalent to home dose   - Synthroid 137mcg qhs through ngt on empty stomach    - Lantus 22 units q hs and Humalog 10units TID AC (prior to bolus feeds) along with low dose corrective scale to cover  - Endo following, appreciate recs    Lines:  -left IJ central line  -amaury feeding tube  -ET    Dispo:  -SICU, MICU/ [facility]

## 2019-01-21 NOTE — PROGRESS NOTE ADULT - SUBJECTIVE AND OBJECTIVE BOX
HISTORY  HPI: 63y Female with pmh COPD, DM, RA, hypothyroid, pulm HTN, CHRIS, HTN admitted on 12/15 for SBO, underwent exploratory laparotomy and adhesiolysis, no transition point was noted. pt transferred to SICU for intentional delayed extubation post op due to pulm comorbidities.   transferred to floors after extubation, eviscerated after having violent coughing episode, RTOR, returned to SICU for intentional delayed extubation. Pt transferred to RCU as developed viral multifocal pneumonia, returned 1/6 for worsening respiratory distress, intubated in SICU and remains intubated till date. Currently course c/b E faecium bacteremia on abx and ID following. Family requesting transfer out, pending pre-authorization from [facility].       24 HOUR EVENTS: Attempted to wean CPAP down to 5/5 with 40% FiO2, patient desaturated to the 80s and became tachypneic. Vent settings were increased back to 8/5 with 60% FiO2 and breathing improved.  Gabapentin increased to 400BID.  Zosyn was discontinued.     SUBJECTIVE/ROS:  [x ] A ten-point review of systems was otherwise negative except as noted.  [ ] Due to altered mental status/intubation, subjective information were not able to be obtained from the patient. History was obtained, to the extent possible, from review of the chart and collateral sources of information.      NEURO  RASS:0     GCS:     CAM ICU:  Exam: awake, alert, oriented  Meds: gabapentin   Solution 400 milliGRAM(s) Oral three times a day  HYDROmorphone PCA (1 mG/mL) 30 milliLiter(s) PCA Continuous PCA Continuous  HYDROmorphone PCA (1 mG/mL) Rescue Clinician Bolus 0.5 milliGRAM(s) IV Push every 15 minutes PRN for Pain Scale GREATER THAN 6  LORazepam     Tablet 0.5 milliGRAM(s) Oral two times a day PRN Anxiety  ondansetron Injectable 4 milliGRAM(s) IV Push every 6 hours PRN Nausea    [x] Adequacy of sedation and pain control has been assessed and adjusted      RESPIRATORY  RR: 18 (01-20-19 @ 20:00) (15 - 25)  SpO2: 93% (01-20-19 @ 22:28) (84% - 100%)  Wt(kg): --  Exam: unlabored, clear to auscultation bilaterally, requiring pressure support 8/5  Mechanical Ventilation: Mode: CPAP with PS, RR (patient): 31, FiO2: 60, PEEP: 5, PS: 5, MAP: 7.1, PIP: 12    [x] Extubation Readiness Assessed  Meds: ALBUTerol    90 MICROgram(s) HFA Inhaler 1 Puff(s) Inhalation every 6 hours  buDESOnide 160 MICROgram(s)/formoterol 4.5 MICROgram(s) Inhaler 2 Puff(s) Inhalation two times a day  sodium chloride 3%  Inhalation 3 milliLiter(s) Inhalation two times a day        CARDIOVASCULAR  HR: 117 (01-20-19 @ 22:28) (93 - 118)  BP: 118/71 (01-20-19 @ 20:00) (109/92 - 159/85)  BP(mean): 81 (01-20-19 @ 20:00) (76 - 120)  VBG - ( 20 Jan 2019 14:10 )  pH: 7.37  /  pCO2: 60    /  pO2: 35    / HCO3: 31    / Base Excess: 8.5   /  SaO2: 58.4   Lactate: 2.0        Exam: regular rate and rhythm  Cardiac Rhythm: sinus  Perfusion     [x]Adequate   [ ]Inadequate  Mentation   [x]Normal       [ ]Reduced  Extremities  [x]Warm         [ ]Cool  Volume Status [ ]Hypervolemic [x]Euvolemic [ ]Hypovolemic  Meds: amLODIPine   Tablet 10 milliGRAM(s) Oral daily  furosemide   Injectable 40 milliGRAM(s) IV Push daily  metoprolol tartrate 25 milliGRAM(s) Enteral Tube two times a day        GI/NUTRITION  Exam: soft, tender around incision, nondistended, incision with multiple retention sutures, fibrinous exudate  Diet: TF   Meds: pantoprazole   Suspension 40 milliGRAM(s) Oral daily      GENITOURINARY  I&O's Detail    01-19 @ 07:01  -  01-20 @ 07:00  --------------------------------------------------------  IN:    Enteral Tube Flush: 400 mL    Glucerna: 1200 mL    IV PiggyBack: 300 mL  Total IN: 1900 mL    OUT:    Rectal Tube: 450 mL    Voided: 2000 mL  Total OUT: 2450 mL    Total NET: -550 mL      01-20 @ 07:01  -  01-21 @ 00:07  --------------------------------------------------------  IN:    Enteral Tube Flush: 125 mL    Glucerna: 800 mL  Total IN: 925 mL    OUT:    Voided: 1550 mL  Total OUT: 1550 mL    Total NET: -625 mL          01-20    144  |  101  |  16  ----------------------------<  121<H>  3.9   |  31  |  0.74    Ca    9.1      20 Jan 2019 02:50  Phos  3.5     01-20  Mg     2.1     01-20          HEMATOLOGIC  Meds: enoxaparin Injectable 40 milliGRAM(s) SubCutaneous daily    [x] VTE Prophylaxis                        8.6    9.44  )-----------( 406      ( 20 Jan 2019 02:50 )             28.8       Transfusion     [ ] PRBC   [ ] Platelets   [ ] FFP   [ ] Cryoprecipitate      INFECTIOUS DISEASES  WBC Count: 9.44 K/uL (01-20 @ 02:50)    RECENT CULTURES:  Specimen Source: BLOOD VENOUS  Date/Time: 01-17 @ 10:09  Culture Results: --  Gram Stain: --  Organism: --    Meds: influenza   Vaccine 0.5 milliLiter(s) IntraMuscular once  vancomycin  IVPB 1000 milliGRAM(s) IV Intermittent every 24 hours        ENDOCRINE  CAPILLARY BLOOD GLUCOSE      POCT Blood Glucose.: 176 mg/dL (20 Jan 2019 21:25)  POCT Blood Glucose.: 238 mg/dL (20 Jan 2019 14:24)  POCT Blood Glucose.: 116 mg/dL (20 Jan 2019 05:00)    Meds: hydrocortisone sodium succinate Injectable 25 milliGRAM(s) IV Push every 12 hours  insulin glargine Injectable (LANTUS) 22 Unit(s) SubCutaneous at bedtime  insulin lispro (HumaLOG) corrective regimen sliding scale   SubCutaneous every 8 hours  insulin lispro Injectable (HumaLOG) 10 Unit(s) SubCutaneous every 8 hours  levothyroxine 137 MICROGram(s) Oral daily  simvastatin 20 milliGRAM(s) Oral at bedtime        ACCESS DEVICES:  [ ] Peripheral IV  [ x] Central Venous Line	[ ] R	[ ] L	[ ] IJ	[ ] Fem	[ ] SC	Placed:   [ ] Arterial Line		[ ] R	[ ] L	[ ] Fem	[ ] Rad	[ ] Ax	Placed:   [ ] PICC:					[ ] Mediport  [ ] Urinary Catheter, Date Placed:   [x] Necessity of urinary, arterial, and venous catheters discussed    OTHER MEDICATIONS:  chlorhexidine 0.12% Liquid 15 milliLiter(s) Swish and Spit two times a day  chlorhexidine 4% Liquid 1 Application(s) Topical <User Schedule>  collagenase Ointment 1 Application(s) Topical daily  naloxone Injectable 0.1 milliGRAM(s) IV Push every 3 minutes PRN HISTORY  HPI: 63y Female with pmh COPD, DM, RA, hypothyroid, pulm HTN, CHRIS, HTN admitted on 12/15 for SBO, underwent exploratory laparotomy and adhesiolysis, no transition point was noted. pt transferred to SICU for intentional delayed extubation post op due to pulm comorbidities.   transferred to floors after extubation, eviscerated on 12/30 after having violent coughing episode, RTOR, returned to SICU for intentional delayed extubation. Pt transferred to RCU as developed viral multifocal pneumonia, returned 1/6 for worsening respiratory distress, intubated in SICU and remains intubated till date. Currently course c/b E faecium bacteremia on abx and ID following. Family requesting transfer out, pending pre-authorization from [facility].     24 HOUR EVENTS: Attempted to wean CPAP down to 5/5 with 40% FiO2, patient desaturated to the 80s and became tachypneic. Vent settings were increased back to 8/5 with 60% FiO2 and breathing improved.  Gabapentin increased to 400BID.  Zosyn was discontinued.     SUBJECTIVE/ROS:  [x ] A ten-point review of systems was otherwise negative except as noted.  [ ] Due to altered mental status/intubation, subjective information were not able to be obtained from the patient. History was obtained, to the extent possible, from review of the chart and collateral sources of information.      NEURO  RASS:0     GCS: 15  CAM ICU:  Exam: awake, alert, oriented  Meds: gabapentin   Solution 400 milliGRAM(s) Oral three times a day  HYDROmorphone PCA (1 mG/mL) 30 milliLiter(s) PCA Continuous PCA Continuous  HYDROmorphone PCA (1 mG/mL) Rescue Clinician Bolus 0.5 milliGRAM(s) IV Push every 15 minutes PRN for Pain Scale GREATER THAN 6  LORazepam     Tablet 0.5 milliGRAM(s) Oral two times a day PRN Anxiety  ondansetron Injectable 4 milliGRAM(s) IV Push every 6 hours PRN Nausea    [x] Adequacy of sedation and pain control has been assessed and adjusted      RESPIRATORY  RR: 18 (01-20-19 @ 20:00) (15 - 25)  SpO2: 93% (01-20-19 @ 22:28) (84% - 100%)  Wt(kg): --  Exam: unlabored, clear to auscultation bilaterally, requiring pressure support 5/5  Mechanical Ventilation: Mode: CPAP with PS, RR (patient): 31, FiO2: 60, PEEP: 5, PS: 5, MAP: 7.1, PIP: 12    [x] Extubation Readiness Assessed, not ready    Meds:   ALBUTerol    90 MICROgram(s) HFA Inhaler 1 Puff(s) Inhalation every 6 hours  buDESOnide 160 MICROgram(s)/formoterol 4.5 MICROgram(s) Inhaler 2 Puff(s) Inhalation two times a day      CARDIOVASCULAR  HR: 117 (01-20-19 @ 22:28) (93 - 118)  BP: 118/71 (01-20-19 @ 20:00) (109/92 - 159/85)  BP(mean): 81 (01-20-19 @ 20:00) (76 - 120)  VBG - ( 20 Jan 2019 14:10 )  pH: 7.37  /  pCO2: 60    /  pO2: 35    / HCO3: 31    / Base Excess: 8.5   /  SaO2: 58.4   Lactate: 2.0      Exam: regular rate and rhythm  Cardiac Rhythm: sinus  Perfusion     [x]Adequate   [ ]Inadequate  Mentation   [x]Normal       [ ]Reduced  Extremities  [x]Warm         [ ]Cool  Volume Status [ ]Hypervolemic [x]Euvolemic [ ]Hypovolemic    Meds:   amLODIPine   Tablet 10 milliGRAM(s) Oral daily  furosemide   Injectable 40 milliGRAM(s) IV Push daily  metoprolol tartrate 25 milliGRAM(s) Enteral Tube two times a day      GI/NUTRITION  Exam: soft, tender around incision, nondistended, incision with 10 retention sutures, fibrinous exudate  Diet, NPO with Tube Feed:   Tube Feeding Modality: Nasogastric  Glucerna 1.2 Dewey (GLUCERNARTH)  Total Volume for 24 Hours (mL): 1200  Bolus   Total Volume of Bolus (mL):  400  Bolus Feed Rate (mL per Hour): 400   Bolus Feed Duration (in Hours): 1  Tube Feed Frequency: Every 8 hours   Tube Feed Start Time: 20:00  No Carb Prosource (1pkg = 15gms Protein)     Qty per Day:  2 (01-16-19 @ 15:45)    Meds: pantoprazole   Suspension 40 milliGRAM(s) Oral daily      GENITOURINARY  exam:  voiding   I&O's Detail    20 Jan 2019 07:01  -  21 Jan 2019 07:00  --------------------------------------------------------  IN:    Enteral Tube Flush: 200 mL    Glucerna: 1200 mL    IV PiggyBack: 250 mL  Total IN: 1650 mL    OUT:    Rectal Tube: 400 mL    Voided: 1650 mL  Total OUT: 2050 mL    Total NET: -400 mL    01-20    144  |  101  |  16  ----------------------------<  121<H>  3.9   |  31  |  0.74    Ca    9.1      20 Jan 2019 02:50  Phos  3.5     01-20  Mg     2.1     01-20      HEMATOLOGIC  Meds: enoxaparin Injectable 40 milliGRAM(s) SubCutaneous daily    [x] VTE Prophylaxis                        8.6    9.44  )-----------( 406      ( 20 Jan 2019 02:50 )             28.8                           8.9    10.07 )-----------( 419      ( 21 Jan 2019 03:00 )             29.6     Transfusion     [ ] PRBC   [ ] Platelets   [ ] FFP   [ ] Cryoprecipitate      INFECTIOUS DISEASES  WBC Count: 10.07 K/uL (01-21 @ 03:00)  WBC Count: 9.44 K/uL (01-20 @ 02:50)  WBC Count: 10.16 K/uL (01-19 @ 04:30)  WBC Count: 13.79 K/uL (01-18 @ 03:00)  WBC Count: 11.13 K/uL (01-17 @ 03:00)    RECENT CULTURES:  01-17 @ 10:09 BLOOD VENOUS         NO ORGANISMS ISOLATED  NO ORGANISMS ISOLATED AT 72 HRS.    01-12 @ 21:49 OTHER Enterococcus faecalis  Corynebacterium species          01-11 @ 21:31 BLOOD PERIPHERAL BLOOD CULTURE PCR  Enterococcus faecium        ***Blood Panel PCR results on this specimen are available  approximately 3 hours after the Gram stain result***  Gram stain, PCR, and/or culture results may not always  correspond due to difference in methodologies  ------------------------------------------------------------  This PCR assay was performed using [employer].  The  following targets are tested for:  Enterococcus, vancomycin  resistant enterococci, Listeria monocytogenes,  coagulase  negative staphylococci, S. aureus, methicillin resistant S.  aureus, Streptococcus agalactiae (Group B), S. pneumoniae,  S. pyogenes (Group A), Acinetobacter baumannii, Enterobacter  cloacae, E. coli, Klebsiella oxytoca, K. pneumoniae, Proteus  sp., Serratia marcescens, Haemophilus influenzae, Neisseria  meningitidis, Pseudomonas aeruginosa, Candida albicans, C.  glabrata, C. krusei, C. parapsilosis, C. tropicalis and the  KPC resistance gene.  **NOTE: Due to technical problems, Proteus sp. will NOT be  reported as part of the BCID paneluntil further notice.    01-11 @ 20:25 BLOOD VENOUS         NO ORGANISMS ISOLATED    01-09 @ 12:31 BRONCHIAL LAVAGE       Meds:   vancomycin  IVPB 1000 milliGRAM(s) IV Intermittent every 24 hours      ENDOCRINE  CAPILLARY BLOOD GLUCOSE      POCT Blood Glucose.: 179 mg/dL (21 Jan 2019 06:18)  POCT Blood Glucose.: 176 mg/dL (20 Jan 2019 21:25)  POCT Blood Glucose.: 238 mg/dL (20 Jan 2019 14:24)      Meds:     insulin glargine Injectable (LANTUS) 22 Unit(s) SubCutaneous at bedtime  insulin lispro (HumaLOG) corrective regimen sliding scale   SubCutaneous every 8 hours  insulin lispro Injectable (HumaLOG) 10 Unit(s) SubCutaneous every 8 hours  levothyroxine 137 MICROGram(s) Oral daily  simvastatin 20 milliGRAM(s) Oral at bedtime  hydrocortisone sodium succinate Injectable 25 milliGRAM(s) IV Push every 12 hours      ACCESS DEVICES:  [ ] Peripheral IV  [ x] Central Venous Line	[ ] R	[x ] L	[ x] IJ	[ ] Fem	[ ] SC	Placed:   [ ] Arterial Line		[ ] R	[ ] L	[ ] Fem	[ ] Rad	[ ] Ax	Placed:   [ ] PICC:					[ ] Mediport  [ ] Urinary Catheter, Date Placed:   [x] Necessity of urinary, arterial, and venous catheters discussed    OTHER MEDICATIONS:  chlorhexidine 0.12% Liquid 15 milliLiter(s) Swish and Spit two times a day  chlorhexidine 4% Liquid 1 Application(s) Topical <User Schedule>  collagenase Ointment 1 Application(s) Topical daily  naloxone Injectable 0.1 milliGRAM(s) IV Push every 3 minutes PRN

## 2019-01-22 LAB
ANION GAP SERPL CALC-SCNC: 10 MMO/L — SIGNIFICANT CHANGE UP (ref 7–14)
BACTERIA BLD CULT: SIGNIFICANT CHANGE UP
BASE EXCESS BLDV CALC-SCNC: 10.9 MMOL/L — SIGNIFICANT CHANGE UP
BLOOD GAS VENOUS - CREATININE: 0.56 MG/DL — SIGNIFICANT CHANGE UP (ref 0.5–1.3)
BUN SERPL-MCNC: 17 MG/DL — SIGNIFICANT CHANGE UP (ref 7–23)
CALCIUM SERPL-MCNC: 9.2 MG/DL — SIGNIFICANT CHANGE UP (ref 8.4–10.5)
CHLORIDE BLDV-SCNC: 105 MMOL/L — SIGNIFICANT CHANGE UP (ref 96–108)
CHLORIDE SERPL-SCNC: 101 MMOL/L — SIGNIFICANT CHANGE UP (ref 98–107)
CO2 SERPL-SCNC: 34 MMOL/L — HIGH (ref 22–31)
CREAT SERPL-MCNC: 0.65 MG/DL — SIGNIFICANT CHANGE UP (ref 0.5–1.3)
GAS PNL BLDV: 143 MMOL/L — SIGNIFICANT CHANGE UP (ref 136–146)
GLUCOSE BLDC GLUCOMTR-MCNC: 122 MG/DL — HIGH (ref 70–99)
GLUCOSE BLDV-MCNC: 153 — HIGH (ref 70–99)
GLUCOSE SERPL-MCNC: 153 MG/DL — HIGH (ref 70–99)
HCO3 BLDV-SCNC: 34 MMOL/L — HIGH (ref 20–27)
HCT VFR BLD CALC: 28.7 % — LOW (ref 34.5–45)
HCT VFR BLDV CALC: 27.1 % — LOW (ref 34.5–45)
HGB BLD-MCNC: 8.5 G/DL — LOW (ref 11.5–15.5)
HGB BLDV-MCNC: 8.7 G/DL — LOW (ref 11.5–15.5)
LACTATE BLDV-MCNC: 2 MMOL/L — SIGNIFICANT CHANGE UP (ref 0.5–2)
MAGNESIUM SERPL-MCNC: 2 MG/DL — SIGNIFICANT CHANGE UP (ref 1.6–2.6)
MCHC RBC-ENTMCNC: 29.6 % — LOW (ref 32–36)
MCHC RBC-ENTMCNC: 30.1 PG — SIGNIFICANT CHANGE UP (ref 27–34)
MCV RBC AUTO: 101.8 FL — HIGH (ref 80–100)
NRBC # FLD: 0.29 K/UL — LOW (ref 25–125)
NRBC FLD-RTO: 3 — SIGNIFICANT CHANGE UP
PCO2 BLDV: 56 MMHG — HIGH (ref 41–51)
PH BLDV: 7.42 PH — SIGNIFICANT CHANGE UP (ref 7.32–7.43)
PHOSPHATE SERPL-MCNC: 2.1 MG/DL — LOW (ref 2.5–4.5)
PLATELET # BLD AUTO: 397 K/UL — SIGNIFICANT CHANGE UP (ref 150–400)
PMV BLD: 9.2 FL — SIGNIFICANT CHANGE UP (ref 7–13)
PO2 BLDV: 36 MMHG — SIGNIFICANT CHANGE UP (ref 35–40)
POTASSIUM BLDV-SCNC: 3.6 MMOL/L — SIGNIFICANT CHANGE UP (ref 3.4–4.5)
POTASSIUM SERPL-MCNC: 3.8 MMOL/L — SIGNIFICANT CHANGE UP (ref 3.5–5.3)
POTASSIUM SERPL-SCNC: 3.8 MMOL/L — SIGNIFICANT CHANGE UP (ref 3.5–5.3)
RBC # BLD: 2.82 M/UL — LOW (ref 3.8–5.2)
RBC # FLD: 17.1 % — HIGH (ref 10.3–14.5)
SAO2 % BLDV: 66.2 % — SIGNIFICANT CHANGE UP (ref 60–85)
SODIUM SERPL-SCNC: 145 MMOL/L — SIGNIFICANT CHANGE UP (ref 135–145)
VANCOMYCIN FLD-MCNC: 5.6 UG/ML — SIGNIFICANT CHANGE UP
WBC # BLD: 9.68 K/UL — SIGNIFICANT CHANGE UP (ref 3.8–10.5)
WBC # FLD AUTO: 9.68 K/UL — SIGNIFICANT CHANGE UP (ref 3.8–10.5)

## 2019-01-22 PROCEDURE — 71045 X-RAY EXAM CHEST 1 VIEW: CPT | Mod: 26

## 2019-01-22 PROCEDURE — 99291 CRITICAL CARE FIRST HOUR: CPT

## 2019-01-22 PROCEDURE — 99233 SBSQ HOSP IP/OBS HIGH 50: CPT

## 2019-01-22 RX ORDER — VANCOMYCIN HCL 1 G
1000 VIAL (EA) INTRAVENOUS
Qty: 0 | Refills: 0 | Status: DISCONTINUED | OUTPATIENT
Start: 2019-01-22 | End: 2019-01-24

## 2019-01-22 RX ORDER — POTASSIUM PHOSPHATE, MONOBASIC POTASSIUM PHOSPHATE, DIBASIC 236; 224 MG/ML; MG/ML
15 INJECTION, SOLUTION INTRAVENOUS ONCE
Qty: 0 | Refills: 0 | Status: COMPLETED | OUTPATIENT
Start: 2019-01-22 | End: 2019-01-22

## 2019-01-22 RX ORDER — ACETAMINOPHEN 500 MG
1000 TABLET ORAL ONCE
Qty: 0 | Refills: 0 | Status: COMPLETED | OUTPATIENT
Start: 2019-01-22 | End: 2019-01-23

## 2019-01-22 RX ORDER — MAGNESIUM SULFATE 500 MG/ML
1 VIAL (ML) INJECTION ONCE
Qty: 0 | Refills: 0 | Status: COMPLETED | OUTPATIENT
Start: 2019-01-22 | End: 2019-01-22

## 2019-01-22 RX ORDER — INSULIN GLARGINE 100 [IU]/ML
22 INJECTION, SOLUTION SUBCUTANEOUS AT BEDTIME
Qty: 0 | Refills: 0 | Status: DISCONTINUED | OUTPATIENT
Start: 2019-01-22 | End: 2019-01-31

## 2019-01-22 RX ORDER — INSULIN LISPRO 100/ML
5 VIAL (ML) SUBCUTANEOUS ONCE
Qty: 0 | Refills: 0 | Status: COMPLETED | OUTPATIENT
Start: 2019-01-22 | End: 2019-01-22

## 2019-01-22 RX ADMIN — INSULIN GLARGINE 22 UNIT(S): 100 INJECTION, SOLUTION SUBCUTANEOUS at 22:02

## 2019-01-22 RX ADMIN — Medication 25 MILLIGRAM(S): at 05:21

## 2019-01-22 RX ADMIN — Medication 100 GRAM(S): at 16:20

## 2019-01-22 RX ADMIN — ALBUTEROL 1 PUFF(S): 90 AEROSOL, METERED ORAL at 21:44

## 2019-01-22 RX ADMIN — Medication 250 MILLIGRAM(S): at 18:13

## 2019-01-22 RX ADMIN — CHLORHEXIDINE GLUCONATE 15 MILLILITER(S): 213 SOLUTION TOPICAL at 18:14

## 2019-01-22 RX ADMIN — Medication 0.5 MILLIGRAM(S): at 21:47

## 2019-01-22 RX ADMIN — Medication 1 APPLICATION(S): at 12:39

## 2019-01-22 RX ADMIN — SODIUM CHLORIDE 3 MILLILITER(S): 9 INJECTION INTRAMUSCULAR; INTRAVENOUS; SUBCUTANEOUS at 21:42

## 2019-01-22 RX ADMIN — SODIUM CHLORIDE 3 MILLILITER(S): 9 INJECTION INTRAMUSCULAR; INTRAVENOUS; SUBCUTANEOUS at 09:42

## 2019-01-22 RX ADMIN — Medication 40 MILLIGRAM(S): at 05:55

## 2019-01-22 RX ADMIN — ALBUTEROL 1 PUFF(S): 90 AEROSOL, METERED ORAL at 15:35

## 2019-01-22 RX ADMIN — HYDROMORPHONE HYDROCHLORIDE 30 MILLILITER(S): 2 INJECTION INTRAMUSCULAR; INTRAVENOUS; SUBCUTANEOUS at 19:20

## 2019-01-22 RX ADMIN — BUDESONIDE AND FORMOTEROL FUMARATE DIHYDRATE 2 PUFF(S): 160; 4.5 AEROSOL RESPIRATORY (INHALATION) at 09:41

## 2019-01-22 RX ADMIN — ALBUTEROL 1 PUFF(S): 90 AEROSOL, METERED ORAL at 09:41

## 2019-01-22 RX ADMIN — AMLODIPINE BESYLATE 10 MILLIGRAM(S): 2.5 TABLET ORAL at 05:21

## 2019-01-22 RX ADMIN — Medication 10 UNIT(S): at 21:44

## 2019-01-22 RX ADMIN — Medication 10 MILLIGRAM(S): at 05:21

## 2019-01-22 RX ADMIN — Medication 5 UNIT(S): at 06:42

## 2019-01-22 RX ADMIN — HYDROMORPHONE HYDROCHLORIDE 30 MILLILITER(S): 2 INJECTION INTRAMUSCULAR; INTRAVENOUS; SUBCUTANEOUS at 07:13

## 2019-01-22 RX ADMIN — GABAPENTIN 400 MILLIGRAM(S): 400 CAPSULE ORAL at 13:44

## 2019-01-22 RX ADMIN — Medication 10 UNIT(S): at 15:41

## 2019-01-22 RX ADMIN — ALBUTEROL 1 PUFF(S): 90 AEROSOL, METERED ORAL at 04:22

## 2019-01-22 RX ADMIN — SIMVASTATIN 20 MILLIGRAM(S): 20 TABLET, FILM COATED ORAL at 21:44

## 2019-01-22 RX ADMIN — GABAPENTIN 400 MILLIGRAM(S): 400 CAPSULE ORAL at 05:20

## 2019-01-22 RX ADMIN — BUDESONIDE AND FORMOTEROL FUMARATE DIHYDRATE 2 PUFF(S): 160; 4.5 AEROSOL RESPIRATORY (INHALATION) at 21:44

## 2019-01-22 RX ADMIN — PANTOPRAZOLE SODIUM 40 MILLIGRAM(S): 20 TABLET, DELAYED RELEASE ORAL at 12:38

## 2019-01-22 RX ADMIN — ENOXAPARIN SODIUM 40 MILLIGRAM(S): 100 INJECTION SUBCUTANEOUS at 12:38

## 2019-01-22 RX ADMIN — GABAPENTIN 400 MILLIGRAM(S): 400 CAPSULE ORAL at 21:44

## 2019-01-22 RX ADMIN — CHLORHEXIDINE GLUCONATE 15 MILLILITER(S): 213 SOLUTION TOPICAL at 05:21

## 2019-01-22 RX ADMIN — POTASSIUM PHOSPHATE, MONOBASIC POTASSIUM PHOSPHATE, DIBASIC 62.5 MILLIMOLE(S): 236; 224 INJECTION, SOLUTION INTRAVENOUS at 05:19

## 2019-01-22 RX ADMIN — Medication 137 MICROGRAM(S): at 05:21

## 2019-01-22 RX ADMIN — HYDROMORPHONE HYDROCHLORIDE 30 MILLILITER(S): 2 INJECTION INTRAMUSCULAR; INTRAVENOUS; SUBCUTANEOUS at 14:01

## 2019-01-22 NOTE — PROGRESS NOTE ADULT - SUBJECTIVE AND OBJECTIVE BOX
Anesthesia Pain Management Service- Attending Addendum    SUBJECTIVE: Pt doing well with IV PCA without problems reported.    Therapy:	  [ X] IV PCA	   [ ] Epidural           [ ] s/p Spinal Opoid              [ ] Postpartum infusion	  [ ] Patient controlled regional anesthesia (PCRA)    [ ] prn Analgesics    Allergies    Allergy Status Unknown    Intolerances    Seroquel (Other)    MEDICATIONS  (STANDING):  ALBUTerol    90 MICROgram(s) HFA Inhaler 1 Puff(s) Inhalation every 6 hours  amLODIPine   Tablet 10 milliGRAM(s) Oral daily  buDESOnide 160 MICROgram(s)/formoterol 4.5 MICROgram(s) Inhaler 2 Puff(s) Inhalation two times a day  chlorhexidine 0.12% Liquid 15 milliLiter(s) Swish and Spit two times a day  chlorhexidine 4% Liquid 1 Application(s) Topical <User Schedule>  collagenase Ointment 1 Application(s) Topical daily  enoxaparin Injectable 40 milliGRAM(s) SubCutaneous daily  furosemide   Injectable 40 milliGRAM(s) IV Push daily  gabapentin   Solution 400 milliGRAM(s) Oral three times a day  HYDROmorphone PCA (1 mG/mL) 30 milliLiter(s) PCA Continuous PCA Continuous  influenza   Vaccine 0.5 milliLiter(s) IntraMuscular once  insulin glargine Injectable (LANTUS) 26 Unit(s) SubCutaneous at bedtime  insulin lispro (HumaLOG) corrective regimen sliding scale   SubCutaneous every 8 hours  insulin lispro Injectable (HumaLOG) 10 Unit(s) SubCutaneous every 8 hours  levothyroxine 137 MICROGram(s) Oral daily  metoprolol tartrate 25 milliGRAM(s) Enteral Tube two times a day  pantoprazole   Suspension 40 milliGRAM(s) Oral daily  predniSONE   Tablet 10 milliGRAM(s) Oral daily  simvastatin 20 milliGRAM(s) Oral at bedtime  sodium chloride 3%  Inhalation 3 milliLiter(s) Inhalation two times a day    MEDICATIONS  (PRN):  HYDROmorphone PCA (1 mG/mL) Rescue Clinician Bolus 0.5 milliGRAM(s) IV Push every 15 minutes PRN for Pain Scale GREATER THAN 6  LORazepam     Tablet 0.5 milliGRAM(s) Oral two times a day PRN Anxiety  naloxone Injectable 0.1 milliGRAM(s) IV Push every 3 minutes PRN For ANY of the following changes in patient status:  A. RR LESS THAN 10 breaths per minute, B. Oxygen saturation LESS THAN 90%, C. Sedation score of 6  ondansetron Injectable 4 milliGRAM(s) IV Push every 6 hours PRN Nausea      OBJECTIVE:   [X] No new signs     [ ] Other:    Side Effects:  [X ] None			[ ] Other:    Assessment of Catheter Site:		[ ] Intact		[ ] Other:    ASSESSMENT/PLAN  [ X] Continue current therapy    [ ] Therapy changed to:    [ ] IV PCA       [ ] Epidural     [ ] prn Analgesics     Comments:

## 2019-01-22 NOTE — PHYSICAL THERAPY INITIAL EVALUATION ADULT - RANGE OF MOTION EXAMINATION, REHAB EVAL
bilateral lower extremity ROM was WFL (within functional limits)/UE ROM assessed by OT
bilateral upper extremity ROM was WFL (within functional limits)/bilateral lower extremity ROM was WFL (within functional limits)/except bilateral shoulder to ~70 degrees flexion. Pt. reports hx of bilateral frozen shoulder.

## 2019-01-22 NOTE — PROGRESS NOTE ADULT - PROBLEM SELECTOR PLAN 1
Continues on bolus feeds but steroid was lowered to prednisone 10mg. Now BG tightly controlled.  Would decrease Lantus to 22 units at bedtime.  Continue Humalog 10 units prebolus q 8h. Hold if tube feeds are held.  Continue low Humalog scale q8h.  Goal glucose 100-180

## 2019-01-22 NOTE — PROGRESS NOTE ADULT - SUBJECTIVE AND OBJECTIVE BOX
Anesthesia Pain Management Service    SUBJECTIVE: Patient is doing well with IV PCA and no significant problems reported.  Patient can communicate with pen and paper, family at bedside complaining that the po Seroquel the patient took this morning made his mother agitated.    Pain Scale Score	At rest: __5/10_ 	With Activity: ___ 	[X ] Refer to charted pain scores    THERAPY:    [ ] IV PCA Morphine		[ ] 5 mg/mL	[ ] 1 mg/mL  [X ] IV PCA Hydromorphone	[ ] 5 mg/mL	[X ] 1 mg/mL  [ ] IV PCA Fentanyl		[ ] 50 micrograms/mL    Demand dose __0.2_ lockout __6_ (minutes) Continuous Rate _0__ Total: _13.05__  mg used (in past 24 hours)      MEDICATIONS  (STANDING):  ALBUTerol    90 MICROgram(s) HFA Inhaler 1 Puff(s) Inhalation every 6 hours  amLODIPine   Tablet 10 milliGRAM(s) Oral daily  buDESOnide 160 MICROgram(s)/formoterol 4.5 MICROgram(s) Inhaler 2 Puff(s) Inhalation two times a day  chlorhexidine 0.12% Liquid 15 milliLiter(s) Swish and Spit two times a day  chlorhexidine 4% Liquid 1 Application(s) Topical <User Schedule>  collagenase Ointment 1 Application(s) Topical daily  enoxaparin Injectable 40 milliGRAM(s) SubCutaneous daily  furosemide   Injectable 40 milliGRAM(s) IV Push daily  gabapentin   Solution 400 milliGRAM(s) Oral three times a day  HYDROmorphone PCA (1 mG/mL) 30 milliLiter(s) PCA Continuous PCA Continuous  influenza   Vaccine 0.5 milliLiter(s) IntraMuscular once  insulin glargine Injectable (LANTUS) 26 Unit(s) SubCutaneous at bedtime  insulin lispro (HumaLOG) corrective regimen sliding scale   SubCutaneous every 8 hours  insulin lispro Injectable (HumaLOG) 10 Unit(s) SubCutaneous every 8 hours  levothyroxine 137 MICROGram(s) Oral daily  metoprolol tartrate 25 milliGRAM(s) Enteral Tube two times a day  pantoprazole   Suspension 40 milliGRAM(s) Oral daily  predniSONE   Tablet 10 milliGRAM(s) Oral daily  QUEtiapine 100 milliGRAM(s) Oral at bedtime  simvastatin 20 milliGRAM(s) Oral at bedtime  sodium chloride 3%  Inhalation 3 milliLiter(s) Inhalation two times a day    MEDICATIONS  (PRN):  HYDROmorphone PCA (1 mG/mL) Rescue Clinician Bolus 0.5 milliGRAM(s) IV Push every 15 minutes PRN for Pain Scale GREATER THAN 6  LORazepam     Tablet 0.5 milliGRAM(s) Oral two times a day PRN Anxiety  naloxone Injectable 0.1 milliGRAM(s) IV Push every 3 minutes PRN For ANY of the following changes in patient status:  A. RR LESS THAN 10 breaths per minute, B. Oxygen saturation LESS THAN 90%, C. Sedation score of 6  ondansetron Injectable 4 milliGRAM(s) IV Push every 6 hours PRN Nausea      OBJECTIVE: Patient lying in bed, with generalized edema, intubated, and NGT tube.    Sedation Score:	[ X] Alert	[ ] Drowsy 	[ ] Arousable	[ ] Asleep	[ ] Unresponsive    Side Effects:	[X ] None	[ ] Nausea	[ ] Vomiting	[ ] Pruritus  		[ ] Other:    Vital Signs Last 24 Hrs  T(C): 37.8 (22 Jan 2019 08:00), Max: 37.8 (22 Jan 2019 08:00)  T(F): 100 (22 Jan 2019 08:00), Max: 100 (22 Jan 2019 08:00)  HR: 114 (22 Jan 2019 08:00) (88 - 121)  BP: 139/69 (22 Jan 2019 08:00) (102/65 - 158/81)  BP(mean): 86 (22 Jan 2019 08:00) (73 - 103)  RR: 23 (22 Jan 2019 08:00) (16 - 23)  SpO2: 94% (22 Jan 2019 08:00) (90% - 97%)    ASSESSMENT/ PLAN    Therapy to  be:	[ X] Continue   [ ] Discontinued   [ ] Change to prn Analgesics    Documentation and Verification of current medications:   [X] Done	[ ] Not done, not elligible    Comments:  Continue current pain regimen. Patient has anxiety, family at bedside believes that mother was agitated this morning due to po Seroquel.  Discussed patient with SICU team and recommended Psych consult to reevaluate anxiety medication.  Based on consult, and recommendations, may reduce continuous rate on IV PCA.

## 2019-01-22 NOTE — PROGRESS NOTE ADULT - SUBJECTIVE AND OBJECTIVE BOX
Follow Up:      Inverval History/ROS:Patient is a 63y old  Female who presents with a chief complaint of SBO (22 Jan 2019 00:10)    Intubated. Alert. no new pain.  Afebrile    Allergies    No Known Allergies    Intolerances        ANTIMICROBIALS:      OTHER MEDS:  ALBUTerol    90 MICROgram(s) HFA Inhaler 1 Puff(s) Inhalation every 6 hours  amLODIPine   Tablet 10 milliGRAM(s) Oral daily  buDESOnide 160 MICROgram(s)/formoterol 4.5 MICROgram(s) Inhaler 2 Puff(s) Inhalation two times a day  chlorhexidine 0.12% Liquid 15 milliLiter(s) Swish and Spit two times a day  chlorhexidine 4% Liquid 1 Application(s) Topical <User Schedule>  collagenase Ointment 1 Application(s) Topical daily  enoxaparin Injectable 40 milliGRAM(s) SubCutaneous daily  furosemide   Injectable 40 milliGRAM(s) IV Push daily  gabapentin   Solution 400 milliGRAM(s) Oral three times a day  HYDROmorphone PCA (1 mG/mL) 30 milliLiter(s) PCA Continuous PCA Continuous  HYDROmorphone PCA (1 mG/mL) Rescue Clinician Bolus 0.5 milliGRAM(s) IV Push every 15 minutes PRN  influenza   Vaccine 0.5 milliLiter(s) IntraMuscular once  insulin glargine Injectable (LANTUS) 26 Unit(s) SubCutaneous at bedtime  insulin lispro (HumaLOG) corrective regimen sliding scale   SubCutaneous every 8 hours  insulin lispro Injectable (HumaLOG) 10 Unit(s) SubCutaneous every 8 hours  levothyroxine 137 MICROGram(s) Oral daily  LORazepam     Tablet 0.5 milliGRAM(s) Oral two times a day PRN  metoprolol tartrate 25 milliGRAM(s) Enteral Tube two times a day  naloxone Injectable 0.1 milliGRAM(s) IV Push every 3 minutes PRN  ondansetron Injectable 4 milliGRAM(s) IV Push every 6 hours PRN  pantoprazole   Suspension 40 milliGRAM(s) Oral daily  predniSONE   Tablet 10 milliGRAM(s) Oral daily  QUEtiapine 100 milliGRAM(s) Oral at bedtime  simvastatin 20 milliGRAM(s) Oral at bedtime  sodium chloride 3%  Inhalation 3 milliLiter(s) Inhalation two times a day      Vital Signs Last 24 Hrs  T(C): 37.8 (22 Jan 2019 08:00), Max: 37.8 (22 Jan 2019 08:00)  T(F): 100 (22 Jan 2019 08:00), Max: 100 (22 Jan 2019 08:00)  HR: 103 (22 Jan 2019 10:00) (88 - 121)  BP: 115/66 (22 Jan 2019 10:00) (102/65 - 158/81)  BP(mean): 76 (22 Jan 2019 10:00) (73 - 103)  RR: 20 (22 Jan 2019 10:00) (16 - 23)  SpO2: 95% (22 Jan 2019 10:00) (90% - 97%)    PHYSICAL EXAM:  General: [x ] intubated  HEAD/EYES: [ ] PERRL [x ] white sclera [ ] icterus  ENT:  [x ] normal [ ] supple [ ] thrush [ ] pharyngeal exudate  Cardiovascular:   [ ] murmur [x ] normal [ ] PPM/AICD  Respiratory:  [ x] clear to ausculation bilaterally  GI:  [x ] soft, non-tender, normal bowel sounds  dressing in place  some draiange from wound  :  [ ] iraheta [ ] no CVA tenderness   Musculoskeletal:  [x ] no synovitis  Neurologic:  [ ] non-focal exam   Skin:  x[ ] no rash  Lymph: [ x] no lymphadenopathy  Psychiatric:  [ ] appropriate affect [ x] alert & oriented  Lines:  [ x] no phlebitis [x ] central line                                8.5    9.68  )-----------( 397      ( 22 Jan 2019 02:40 )             28.7       01-22    145  |  101  |  17  ----------------------------<  153<H>  3.8   |  34<H>  |  0.65    Ca    9.2      22 Jan 2019 02:40  Phos  2.1     01-22  Mg     2.0     01-22            MICROBIOLOGY:    RADIOLOGY:

## 2019-01-22 NOTE — OCCUPATIONAL THERAPY INITIAL EVALUATION ADULT - PLANNED THERAPY INTERVENTIONS, OT EVAL
ROM/bed mobility training/strengthening/transfer training/fine motor coordination training/ADL retraining/balance training

## 2019-01-22 NOTE — PROGRESS NOTE ADULT - PROBLEM SELECTOR PLAN 3
Now on prednisone 10mg daily (home dose)  Consider increasing if hemodynamically unstable for stress dose.

## 2019-01-22 NOTE — PROGRESS NOTE ADULT - ASSESSMENT
63 F with PMH of COPD (on home O2 (3L), hypothyroidism, T2DM (HbA1c 8s 11/2018, on insulin), RA on prednisone (10 mg daily x 30 years), HTN,  admitted for SBO s/p ex lap with lysis of adhesions, now intubated for hypercapnic respiratory failure. Patient is on Glucerna enteral bolus feeds.

## 2019-01-22 NOTE — PROGRESS NOTE ADULT - ASSESSMENT
63y Female hx of COPD presented initially with SBO, underwent ex lap 12/15 closed with 4 retention sutures, evicerated on 12/30 and closed with 6 more retention sutures, c/b multifocal pneumonia, developed hypercapnic respiratory failure requiring intubation 1/6, S/p bronch 1/9/18, improving and tolerating CPAP, still intubated, course further c/b Enterococci bacteremia no on vanco and zosyn, improving awaiting transfer to either [facility] MICU or [facility]    Neuro:  - continue ativan 0.5 mg PO PRN for discomfort/ anxiety  - ct PCA dilaudid, pt with chronic opioid requirement  - ct gabapentin to 400mg tid    Resp:  - Intubated, continue to decrease pressure support as tolerated, currently on 8/5 support, trial to wean off FiO2 failed    - C/w inhalers, aggressive pulmonary toilet   - OOB to chair   - appreciate pulm samuel, Dr Lisker following, if not transferred to [facility], pt will be transferred to MICU when bed is available     CV:   - Continue amlodipine 10mg  - Metoprolol 25mg po BID   - Lasix 40 mg IV od, diurese to keep net negative 1 liter today    GI:  - s/p 2*ex-lap, 10 retention sutures in situ, gaping wound covered in fibrinous exudate   - santyl to wound daily  - c/w protonix daily, home med  - Patient on bolus tube feeds at goal, tolerating well    Renal:  - BMP and I&O monitoring as routine, stable BMP   - iraheta removed, patient spontaneously voiding   - Repletion of electrolytes PRN    Heme:  - H/H stable   - VTE ppx w/ Lovenox and IPCs    ID:  - blood cultures 1/12 growing Enterococcus faecium, repeat blood culture 1/17 NGTD  - lines: iraheta removed, rt IJV (1/6) changed to lt IJV 1/13,  ET 1/6  - Vancomycin D/C'd 1/22     Endo:  - hx RA on chronic prednisone, DM, hypothyroid  - Hydrocortisone changed to home Prednisone dose 10mg  - Synthroid 137mcg qhs through ngt on empty stomach    - Lantus 26 units q hs and Humalog 10units TID AC (prior to bolus feeds) along with low dose corrective scale to cover    Lines:  -left IJ central line  -amaury feeding tube  -ET    Dispo:  -SICU, MICU/ [facility] 63y Female hx of COPD presented initially with SBO, underwent ex lap 12/15 closed with 4 retention sutures, evicerated on 12/30 and closed with 6 more retention sutures, c/b multifocal pneumonia, developed hypercapnic respiratory failure requiring intubation 1/6, S/p bronch 1/9/18, improving and tolerating CPAP, still intubated, course further c/b Enterococci bacteremia no on vanco and zosyn, improving awaiting transfer to either [facility] MICU or [facility]    Neuro:  - continue ativan 0.5 mg PO PRN for discomfort/ anxiety  - ct PCA dilaudid, pt with chronic opioid requirement  - ct gabapentin to 400mg tid  - started on Seroquel bedtime for agitation     Resp:  - Intubated, can pull good volumes on PS 5/5 but to avoid increased WOB pt put back on full support  - C/w inhalers, aggressive pulmonary toilet   - encourage OOB to chair   - appreciate pullatoya baker, Dr Lisker following, if not transferred to [facility], pt will be transferred to MICU when bed is available     CV:   - Continue amlodipine 10mg  - Metoprolol 25mg po BID   - Lasix 40 mg IV od, diurese to keep net negative 1 liter everyday, received total 80 mg yd     GI:  - s/p 2*ex-lap, 10 retention sutures in situ, gaping wound covered in fibrinous exudate   - santyl to wound daily  - c/w protonix daily, home med  - Patient on bolus tube feeds at goal, tolerating well    Renal:  - BMP and I&O monitoring as routine, stable BMP   - iraheta removed, patient spontaneously voiding   - Repletion of electrolytes PRN    Heme:  - H/H stable   - VTE ppx w/ Lovenox and IPCs    ID:  - blood cultures 1/12 growing Enterococcus faecium, repeat blood culture 1/17 NGTD  - lines: iraheta removed, rt IJV (1/6) changed to lt IJV 1/13,  ET 1/6  - pt off abx now     Endo:  - hx RA on chronic prednisone, DM, hypothyroid  - Hydrocortisone changed to home Prednisone dose 10mg Po ngt  - Synthroid 137mcg od through ngt on empty stomach    - Lantus 26 units q hs and Humalog 10units TID AC (prior to bolus feeds) along with low dose corrective scale to cover, monitor sugar levels and adjust insulin accordingly     Lines:  -left IJ central line  -amaury feeding tube  -ET    [facility]

## 2019-01-22 NOTE — PROGRESS NOTE ADULT - SUBJECTIVE AND OBJECTIVE BOX
B-Team Surgery Progress Note     Subjective/24hour Events: Place on full PS ventilation due to increased work of breathing. Mentating well. Pain controlled. Home prednisone dose restarted. Vancomycin was stopped.    Vital Signs:  Vital Signs Last 24 Hrs  T(C): 37.8 (22 Jan 2019 08:00), Max: 37.8 (22 Jan 2019 08:00)  T(F): 100 (22 Jan 2019 08:00), Max: 100 (22 Jan 2019 08:00)  HR: 102 (22 Jan 2019 09:42) (88 - 121)  BP: 139/69 (22 Jan 2019 08:00) (102/65 - 158/81)  BP(mean): 86 (22 Jan 2019 08:00) (73 - 103)  RR: 23 (22 Jan 2019 08:00) (16 - 23)  SpO2: 94% (22 Jan 2019 08:00) (90% - 97%)    CAPILLARY BLOOD GLUCOSE      POCT Blood Glucose.: 103 mg/dL (22 Jan 2019 06:19)  POCT Blood Glucose.: 82 mg/dL (22 Jan 2019 05:35)  POCT Blood Glucose.: 85 mg/dL (22 Jan 2019 05:35)  POCT Blood Glucose.: 200 mg/dL (21 Jan 2019 22:30)  POCT Blood Glucose.: 186 mg/dL (21 Jan 2019 14:12)      I&O's Detail    21 Jan 2019 07:01  -  22 Jan 2019 07:00  --------------------------------------------------------  IN:    Enteral Tube Flush: 340 mL    Glucerna: 1300 mL    IV PiggyBack: 250 mL  Total IN: 1890 mL    OUT:    Rectal Tube: 400 mL    Voided: 2300 mL  Total OUT: 2700 mL    Total NET: -810 mL            MEDICATIONS  (STANDING):  ALBUTerol    90 MICROgram(s) HFA Inhaler 1 Puff(s) Inhalation every 6 hours  amLODIPine   Tablet 10 milliGRAM(s) Oral daily  buDESOnide 160 MICROgram(s)/formoterol 4.5 MICROgram(s) Inhaler 2 Puff(s) Inhalation two times a day  chlorhexidine 0.12% Liquid 15 milliLiter(s) Swish and Spit two times a day  chlorhexidine 4% Liquid 1 Application(s) Topical <User Schedule>  collagenase Ointment 1 Application(s) Topical daily  enoxaparin Injectable 40 milliGRAM(s) SubCutaneous daily  furosemide   Injectable 40 milliGRAM(s) IV Push daily  gabapentin   Solution 400 milliGRAM(s) Oral three times a day  HYDROmorphone PCA (1 mG/mL) 30 milliLiter(s) PCA Continuous PCA Continuous  influenza   Vaccine 0.5 milliLiter(s) IntraMuscular once  insulin glargine Injectable (LANTUS) 26 Unit(s) SubCutaneous at bedtime  insulin lispro (HumaLOG) corrective regimen sliding scale   SubCutaneous every 8 hours  insulin lispro Injectable (HumaLOG) 10 Unit(s) SubCutaneous every 8 hours  levothyroxine 137 MICROGram(s) Oral daily  metoprolol tartrate 25 milliGRAM(s) Enteral Tube two times a day  pantoprazole   Suspension 40 milliGRAM(s) Oral daily  predniSONE   Tablet 10 milliGRAM(s) Oral daily  QUEtiapine 100 milliGRAM(s) Oral at bedtime  simvastatin 20 milliGRAM(s) Oral at bedtime  sodium chloride 3%  Inhalation 3 milliLiter(s) Inhalation two times a day    MEDICATIONS  (PRN):  HYDROmorphone PCA (1 mG/mL) Rescue Clinician Bolus 0.5 milliGRAM(s) IV Push every 15 minutes PRN for Pain Scale GREATER THAN 6  LORazepam     Tablet 0.5 milliGRAM(s) Oral two times a day PRN Anxiety  naloxone Injectable 0.1 milliGRAM(s) IV Push every 3 minutes PRN For ANY of the following changes in patient status:  A. RR LESS THAN 10 breaths per minute, B. Oxygen saturation LESS THAN 90%, C. Sedation score of 6  ondansetron Injectable 4 milliGRAM(s) IV Push every 6 hours PRN Nausea        Physical Exam:  Gen: NAD  LS: nml respiratory effort  Card: pulse regularly present  GI: soft, non-distended, non-tender, retention sutures in place, dressing c/d/i  Ext: warm      Labs:    01-22    145  |  101  |  17  ----------------------------<  153<H>  3.8   |  34<H>  |  0.65    Ca    9.2      22 Jan 2019 02:40  Phos  2.1     01-22  Mg     2.0     01-22                              8.5    9.68  )-----------( 397      ( 22 Jan 2019 02:40 )             28.7               Imaging:    EXAM:  XR CHEST PORTABLE ROUTINE 1V      PROCEDURE DATE:  Jan 22 2019     INTERPRETATION:  TIME OF EXAM: January 22, 2019 at 12:25 AM    CLINICAL INFORMATION: SICU follow-up    TECHNIQUE:   Portable chest    INTERPRETATION:     The endotracheal and enteric tube in addition to the left IJ line are   unchanged. Improved aeration at the left lung base since the last study   likely resolving atelectasis.    Low lung volumes with peripheral opacities consistent with multifocal   pneumonia. Heart size is stable. No pneumothorax.    COMPARISON:  January 21 at 8:51 AM    IMPRESSION:  Follow-up suspected multifocal pneumonia with improved   aeration at the left lung base.

## 2019-01-22 NOTE — PROGRESS NOTE ADULT - SUBJECTIVE AND OBJECTIVE BOX
Chief Complaint: DM2/steroid/hypothyroid    History: Remains on vent, on enteral bolus feeds.  Steroid was lowered to prednisone 10mg which is home regimen.    MEDICATIONS  (STANDING):  ALBUTerol    90 MICROgram(s) HFA Inhaler 1 Puff(s) Inhalation every 6 hours  amLODIPine   Tablet 10 milliGRAM(s) Oral daily  buDESOnide 160 MICROgram(s)/formoterol 4.5 MICROgram(s) Inhaler 2 Puff(s) Inhalation two times a day  chlorhexidine 0.12% Liquid 15 milliLiter(s) Swish and Spit two times a day  chlorhexidine 4% Liquid 1 Application(s) Topical <User Schedule>  collagenase Ointment 1 Application(s) Topical daily  enoxaparin Injectable 40 milliGRAM(s) SubCutaneous daily  furosemide   Injectable 40 milliGRAM(s) IV Push daily  gabapentin   Solution 400 milliGRAM(s) Oral three times a day  HYDROmorphone PCA (1 mG/mL) 30 milliLiter(s) PCA Continuous PCA Continuous  influenza   Vaccine 0.5 milliLiter(s) IntraMuscular once  insulin glargine Injectable (LANTUS) 22 Unit(s) SubCutaneous at bedtime  insulin lispro (HumaLOG) corrective regimen sliding scale   SubCutaneous every 8 hours  insulin lispro Injectable (HumaLOG) 10 Unit(s) SubCutaneous every 8 hours  levothyroxine 137 MICROGram(s) Oral daily  metoprolol tartrate 25 milliGRAM(s) Enteral Tube two times a day  pantoprazole   Suspension 40 milliGRAM(s) Oral daily  predniSONE   Tablet 10 milliGRAM(s) Oral daily  simvastatin 20 milliGRAM(s) Oral at bedtime  sodium chloride 3%  Inhalation 3 milliLiter(s) Inhalation two times a day  vancomycin  IVPB 1000 milliGRAM(s) IV Intermittent two times a day    MEDICATIONS  (PRN):  HYDROmorphone PCA (1 mG/mL) Rescue Clinician Bolus 0.5 milliGRAM(s) IV Push every 15 minutes PRN for Pain Scale GREATER THAN 6  LORazepam     Tablet 0.5 milliGRAM(s) Oral two times a day PRN Anxiety  naloxone Injectable 0.1 milliGRAM(s) IV Push every 3 minutes PRN For ANY of the following changes in patient status:  A. RR LESS THAN 10 breaths per minute, B. Oxygen saturation LESS THAN 90%, C. Sedation score of 6  ondansetron Injectable 4 milliGRAM(s) IV Push every 6 hours PRN Nausea      Allergies    Allergy Status Unknown    Intolerances    Seroquel (Other)    Review of Systems:    UNABLE TO OBTAIN    PHYSICAL EXAM:  VITALS: T(C): 37.2 (01-22-19 @ 16:00)  T(F): 99 (01-22-19 @ 16:00), Max: 100 (01-22-19 @ 08:00)  HR: 89 (01-22-19 @ 18:00) (88 - 121)  BP: 97/63 (01-22-19 @ 18:00) (95/59 - 152/88)  RR:  (16 - 23)  SpO2:  (90% - 97%)  Wt(kg): --  GENERAL: NAD, comfortable  EYES: No proptosis, anicteric  HEENT:  Atraumatic, Normocephalic, ETT in place  RESPIRATORY: On vent, nonlabored  CARDIOVASCULAR: Regular rate and rhythm  PSYCH: cannot assess      CAPILLARY BLOOD GLUCOSE      POCT Blood Glucose.: 166 mg/dL (22 Jan 2019 13:34)  POCT Blood Glucose.: 103 mg/dL (22 Jan 2019 06:19)  POCT Blood Glucose.: 82 mg/dL (22 Jan 2019 05:35)  POCT Blood Glucose.: 85 mg/dL (22 Jan 2019 05:35)  POCT Blood Glucose.: 200 mg/dL (21 Jan 2019 22:30)      01-22    145  |  101  |  17  ----------------------------<  153<H>  3.8   |  34<H>  |  0.65    EGFR if : 111  EGFR if non : 96    Ca    9.2      01-22  Mg     2.0     01-22  Phos  2.1     01-22            Thyroid Function Tests:  12-27 @ 07:15 TSH -- FreeT4 1.18 T3 -- Anti TPO -- Anti Thyroglobulin Ab -- TSI --      Hemoglobin A1C, Whole Blood: 7.1 % <H> [4.0 - 5.6] (12-17-18 @ 03:50)  Hemoglobin A1C, Whole Blood: 7.3 % <H> [4.0 - 5.6] (12-16-18 @ 04:17)

## 2019-01-22 NOTE — PROGRESS NOTE ADULT - ASSESSMENT
63 year old female with RA immunosuppressed from chronic steroids, underlying DM, presented on 12/15 with SBO.  S/p laparotomy and subsequent revision, now with respiratory failure and sepsis due to an enterococcal bacteremia.  Course/Infection complicated by underlying DM/ immuno suppression. High risk for complications.    Critically ill with Respiratory failure    1) Enterococcal bacteremia: Enterococcus in the blood is most often form a , abdominal, wound or line infection.   the patient has risk for all of the above.    Recent C ta/p without abscess but concern for midline wound infection  -Wound culture with mixed growth (including enterococcus but different species)        -Repeat blood culture without growth  -Consider MATEO   -Continue vancomycin    2) Abdominal Wound: retention sutures in place  Some drainage from lower pole  No abscess on CT    Wound Care      3) Immunosuppressed: due ot steroid use  Impairs wound healing    4) DM: glycemic control

## 2019-01-22 NOTE — CHART NOTE - NSCHARTNOTEFT_GEN_A_CORE
HPI:     64 y/o female with PMH of HTN, DM, hypothyroidism, RA (10mg prednisone), pulmonary HTN, CHRIS, and COPD (3L at home) who was originally admitted on 12/15 for SBO. Underwent ex lap wit lysis of adhesions on 12/16 after which time she was transferred to the SICU for delayed extubation. Was transferred to the floors after extubation when on 12/30 she eviscerated 2/2 a coughing episode, went her second ex lap and again returned to the SICU for delayed extubation. Of note, 10 to 12 retention sutures were placed over the course of the two surgeries. Was then transferred to RCU for multifocal pneumonia where she was placed on BIPAP. There developed ARDS and returned to the SICU where she was re-intubated on 1/6. She has since failed multiple attempts at extubation and both the pt and family (Son, [place], is healthcare proxy) are reportedly refusing trach. Antibiotics were discontinued yesterday, although ID is recommending continued therapy with vancomycin for the next two weeks due to prior blood cultures growing E faecium. The pt and her family are requesting transfer due to frustration with her care thus far, so MICU was consulted for transfer.      SUBJECTIVE: Patient seen and examined at bedside.     OBJECTIVE:    VITAL SIGNS:  ICU Vital Signs Last 24 Hrs  T(C): 37.8 (22 Jan 2019 08:00), Max: 37.8 (22 Jan 2019 08:00)  T(F): 100 (22 Jan 2019 08:00), Max: 100 (22 Jan 2019 08:00)  HR: 102 (22 Jan 2019 11:01) (88 - 121)  BP: 115/66 (22 Jan 2019 10:00) (102/65 - 158/81)  BP(mean): 76 (22 Jan 2019 10:00) (73 - 103)  ABP: --  ABP(mean): --  RR: 20 (22 Jan 2019 10:00) (16 - 23)  SpO2: 97% (22 Jan 2019 11:01) (90% - 97%)    Mode: CPAP with PS, FiO2: 60, PEEP: 5, PS: 15, MAP: 10, PIP: 22    01-21 @ 07:01  -  01-22 @ 07:00  --------------------------------------------------------  IN: 1890 mL / OUT: 2700 mL / NET: -810 mL    01-22 @ 07:01  - 01-22 @ 13:01  --------------------------------------------------------  IN: 0 mL / OUT: 700 mL / NET: -700 mL      CAPILLARY BLOOD GLUCOSE      POCT Blood Glucose.: 103 mg/dL (22 Jan 2019 06:19)      PHYSICAL EXAM:    General: NAD  HEENT: NC/AT; PERRL, clear conjunctiva  Neck: supple  Respiratory: CTA b/l  Cardiovascular: +S1/S2; RRR  Abdomen: soft, NT/ND; +BS x4  Extremities: WWP, 2+ peripheral pulses b/l; no LE edema  Skin: normal color and turgor; no rash  Neurological:    MEDICATIONS:  MEDICATIONS  (STANDING):  ALBUTerol    90 MICROgram(s) HFA Inhaler 1 Puff(s) Inhalation every 6 hours  amLODIPine   Tablet 10 milliGRAM(s) Oral daily  buDESOnide 160 MICROgram(s)/formoterol 4.5 MICROgram(s) Inhaler 2 Puff(s) Inhalation two times a day  chlorhexidine 0.12% Liquid 15 milliLiter(s) Swish and Spit two times a day  chlorhexidine 4% Liquid 1 Application(s) Topical <User Schedule>  collagenase Ointment 1 Application(s) Topical daily  enoxaparin Injectable 40 milliGRAM(s) SubCutaneous daily  furosemide   Injectable 40 milliGRAM(s) IV Push daily  gabapentin   Solution 400 milliGRAM(s) Oral three times a day  HYDROmorphone PCA (1 mG/mL) 30 milliLiter(s) PCA Continuous PCA Continuous  influenza   Vaccine 0.5 milliLiter(s) IntraMuscular once  insulin glargine Injectable (LANTUS) 26 Unit(s) SubCutaneous at bedtime  insulin lispro (HumaLOG) corrective regimen sliding scale   SubCutaneous every 8 hours  insulin lispro Injectable (HumaLOG) 10 Unit(s) SubCutaneous every 8 hours  levothyroxine 137 MICROGram(s) Oral daily  metoprolol tartrate 25 milliGRAM(s) Enteral Tube two times a day  pantoprazole   Suspension 40 milliGRAM(s) Oral daily  predniSONE   Tablet 10 milliGRAM(s) Oral daily  QUEtiapine 100 milliGRAM(s) Oral at bedtime  simvastatin 20 milliGRAM(s) Oral at bedtime  sodium chloride 3%  Inhalation 3 milliLiter(s) Inhalation two times a day    MEDICATIONS  (PRN):  HYDROmorphone PCA (1 mG/mL) Rescue Clinician Bolus 0.5 milliGRAM(s) IV Push every 15 minutes PRN for Pain Scale GREATER THAN 6  LORazepam     Tablet 0.5 milliGRAM(s) Oral two times a day PRN Anxiety  naloxone Injectable 0.1 milliGRAM(s) IV Push every 3 minutes PRN For ANY of the following changes in patient status:  A. RR LESS THAN 10 breaths per minute, B. Oxygen saturation LESS THAN 90%, C. Sedation score of 6  ondansetron Injectable 4 milliGRAM(s) IV Push every 6 hours PRN Nausea      ALLERGIES:  Allergies    Allergy Status Unknown    Intolerances    Seroquel (Other)      LABS:                        8.5    9.68  )-----------( 397      ( 22 Jan 2019 02:40 )             28.7     Hemoglobin: 8.5 g/dL (01-22 @ 02:40)  Hemoglobin: 8.9 g/dL (01-21 @ 03:00)  Hemoglobin: 8.6 g/dL (01-20 @ 02:50)  Hemoglobin: 8.2 g/dL (01-19 @ 04:30)  Hemoglobin: 9.0 g/dL (01-18 @ 03:00)    CBC Full  -  ( 22 Jan 2019 02:40 )  WBC Count : 9.68 K/uL  Hemoglobin : 8.5 g/dL  Hematocrit : 28.7 %  Platelet Count - Automated : 397 K/uL  Mean Cell Volume : 101.8 fL  Mean Cell Hemoglobin : 30.1 pg  Mean Cell Hemoglobin Concentration : 29.6 %  Auto Neutrophil # : x  Auto Lymphocyte # : x  Auto Monocyte # : x  Auto Eosinophil # : x  Auto Basophil # : x  Auto Neutrophil % : x  Auto Lymphocyte % : x  Auto Monocyte % : x  Auto Eosinophil % : x  Auto Basophil % : x    01-22    145  |  101  |  17  ----------------------------<  153<H>  3.8   |  34<H>  |  0.65    Ca    9.2      22 Jan 2019 02:40  Phos  2.1     01-22  Mg     2.0     01-22    Creatinine Trend: 0.65<--, 0.68<--, 0.74<--, 0.78<--, 0.82<--, 0.85<--    02:40 - VBG - pH: 7.42  | pCO2: 56    | pO2: 36    | Lactate: 2.0        EKG:   MICROBIOLOGY:    IMAGING:    RADIOLOGY & ADDITIONAL TESTS: Reviewed. HPI:     64 y/o female with PMH of HTN, DM, hypothyroidism, RA (10mg prednisone), pulmonary HTN, CHRIS, and COPD (3L at home) who was originally admitted on 12/15 for SBO. Underwent ex lap wit lysis of adhesions on 12/16 after which time she was transferred to the SICU for delayed extubation. Was transferred to the floors after extubation when on 12/30 she eviscerated 2/2 a coughing episode, went her second ex lap and again returned to the SICU for delayed extubation. Of note, 10 to 12 retention sutures were placed over the course of the two surgeries. Was then transferred to RCU for multifocal pneumonia where she was placed on BIPAP. There developed ARDS and returned to the SICU where she was re-intubated on 1/6. She has since failed multiple attempts at extubation and both the pt and family (Son, [place], is healthcare proxy) are reportedly refusing trach. Antibiotics were discontinued yesterday, although ID is recommending continued therapy with vancomycin for the next two weeks due to prior blood cultures growing E faecium. The pt and her family are requesting transfer to outside hospital which was not feasible, so MICU was consulted for transfer.      SUBJECTIVE: Patient seen and examined at bedside.     OBJECTIVE:    VITAL SIGNS:  ICU Vital Signs Last 24 Hrs  T(C): 37.8 (22 Jan 2019 08:00), Max: 37.8 (22 Jan 2019 08:00)  T(F): 100 (22 Jan 2019 08:00), Max: 100 (22 Jan 2019 08:00)  HR: 102 (22 Jan 2019 11:01) (88 - 121)  BP: 115/66 (22 Jan 2019 10:00) (102/65 - 158/81)  BP(mean): 76 (22 Jan 2019 10:00) (73 - 103)  ABP: --  ABP(mean): --  RR: 20 (22 Jan 2019 10:00) (16 - 23)  SpO2: 97% (22 Jan 2019 11:01) (90% - 97%)    Mode: CPAP with PS, FiO2: 60, PEEP: 5, PS: 15, MAP: 10, PIP: 22    01-21 @ 07:01  -  01-22 @ 07:00  --------------------------------------------------------  IN: 1890 mL / OUT: 2700 mL / NET: -810 mL    01-22 @ 07:01  - 01-22 @ 13:01  --------------------------------------------------------  IN: 0 mL / OUT: 700 mL / NET: -700 mL      CAPILLARY BLOOD GLUCOSE      POCT Blood Glucose.: 103 mg/dL (22 Jan 2019 06:19)      PHYSICAL EXAM:    General: NAD  HEENT: NC/AT; PERRL, clear conjunctiva  Neck: supple  Respiratory: CTA b/l  Cardiovascular: +S1/S2; RRR  Abdomen: soft, NT/ND; +BS x4  Extremities: WWP, 2+ peripheral pulses b/l; no LE edema  Skin: normal color and turgor; no rash  Neurological:    MEDICATIONS:  MEDICATIONS  (STANDING):  ALBUTerol    90 MICROgram(s) HFA Inhaler 1 Puff(s) Inhalation every 6 hours  amLODIPine   Tablet 10 milliGRAM(s) Oral daily  buDESOnide 160 MICROgram(s)/formoterol 4.5 MICROgram(s) Inhaler 2 Puff(s) Inhalation two times a day  chlorhexidine 0.12% Liquid 15 milliLiter(s) Swish and Spit two times a day  chlorhexidine 4% Liquid 1 Application(s) Topical <User Schedule>  collagenase Ointment 1 Application(s) Topical daily  enoxaparin Injectable 40 milliGRAM(s) SubCutaneous daily  furosemide   Injectable 40 milliGRAM(s) IV Push daily  gabapentin   Solution 400 milliGRAM(s) Oral three times a day  HYDROmorphone PCA (1 mG/mL) 30 milliLiter(s) PCA Continuous PCA Continuous  influenza   Vaccine 0.5 milliLiter(s) IntraMuscular once  insulin glargine Injectable (LANTUS) 26 Unit(s) SubCutaneous at bedtime  insulin lispro (HumaLOG) corrective regimen sliding scale   SubCutaneous every 8 hours  insulin lispro Injectable (HumaLOG) 10 Unit(s) SubCutaneous every 8 hours  levothyroxine 137 MICROGram(s) Oral daily  metoprolol tartrate 25 milliGRAM(s) Enteral Tube two times a day  pantoprazole   Suspension 40 milliGRAM(s) Oral daily  predniSONE   Tablet 10 milliGRAM(s) Oral daily  QUEtiapine 100 milliGRAM(s) Oral at bedtime  simvastatin 20 milliGRAM(s) Oral at bedtime  sodium chloride 3%  Inhalation 3 milliLiter(s) Inhalation two times a day    MEDICATIONS  (PRN):  HYDROmorphone PCA (1 mG/mL) Rescue Clinician Bolus 0.5 milliGRAM(s) IV Push every 15 minutes PRN for Pain Scale GREATER THAN 6  LORazepam     Tablet 0.5 milliGRAM(s) Oral two times a day PRN Anxiety  naloxone Injectable 0.1 milliGRAM(s) IV Push every 3 minutes PRN For ANY of the following changes in patient status:  A. RR LESS THAN 10 breaths per minute, B. Oxygen saturation LESS THAN 90%, C. Sedation score of 6  ondansetron Injectable 4 milliGRAM(s) IV Push every 6 hours PRN Nausea      ALLERGIES:  Allergies    Allergy Status Unknown    Intolerances    Seroquel (Other)      LABS:                        8.5    9.68  )-----------( 397      ( 22 Jan 2019 02:40 )             28.7     Hemoglobin: 8.5 g/dL (01-22 @ 02:40)  Hemoglobin: 8.9 g/dL (01-21 @ 03:00)  Hemoglobin: 8.6 g/dL (01-20 @ 02:50)  Hemoglobin: 8.2 g/dL (01-19 @ 04:30)  Hemoglobin: 9.0 g/dL (01-18 @ 03:00)    CBC Full  -  ( 22 Jan 2019 02:40 )  WBC Count : 9.68 K/uL  Hemoglobin : 8.5 g/dL  Hematocrit : 28.7 %  Platelet Count - Automated : 397 K/uL  Mean Cell Volume : 101.8 fL  Mean Cell Hemoglobin : 30.1 pg  Mean Cell Hemoglobin Concentration : 29.6 %  Auto Neutrophil # : x  Auto Lymphocyte # : x  Auto Monocyte # : x  Auto Eosinophil # : x  Auto Basophil # : x  Auto Neutrophil % : x  Auto Lymphocyte % : x  Auto Monocyte % : x  Auto Eosinophil % : x  Auto Basophil % : x    01-22    145  |  101  |  17  ----------------------------<  153<H>  3.8   |  34<H>  |  0.65    Ca    9.2      22 Jan 2019 02:40  Phos  2.1     01-22  Mg     2.0     01-22    Creatinine Trend: 0.65<--, 0.68<--, 0.74<--, 0.78<--, 0.82<--, 0.85<--    02:40 - VBG - pH: 7.42  | pCO2: 56    | pO2: 36    | Lactate: 2.0        EKG:   MICROBIOLOGY:    IMAGING:    RADIOLOGY & ADDITIONAL TESTS: Reviewed.          Assessment and Plan:    63y Female hx of COPD presented initially with SBO, underwent ex lap 12/15 closed with 4 retention sutures, evicerated on 12/30 and closed with 6 more retention sutures, c/b multifocal pneumonia, developed hypercapnic respiratory failure requiring intubation 1/6, s/p bronch 1/9/18, improving and tolerating CPAP, still intubated, course further c/b Enterococci bacteremia. Transferred to MICU at request of patient/family.   Neuro:  - continue ativan 0.5 mg PO PRN for discomfort/ anxiety  - ct PCA dilaudid, pt with chronic opioid requirement  - ct gabapentin to 400mg tid  - started on Seroquel bedtime for agitation--will d/c due to patient/family request     Resp:  - Intubated, can pull good volumes on PS 5/5 but to avoid increased WOB pt put back on full support  - C/w inhalers, aggressive pulmonary toilet   - encourage OOB to chair   - pulm following, will f/u recs     CV:   - Continue amlodipine 10mg  - Metoprolol 25mg po BID   - Lasix 40 mg IV od, diurese to keep net negative 1 liter everyday, received total 80 mg yd     GI:  - s/p 2*ex-lap, 10 retention sutures in situ, gaping wound covered in fibrinous exudate   - santyl to wound daily  - c/w protonix daily, home med  - Patient on bolus tube feeds at goal, tolerating well    Renal:  - BMP and I&O monitoring as routine, stable BMP   - iraheta removed, patient spontaneously voiding   - Repletion of electrolytes PRN    Heme:  - H/H stable   - VTE ppx w/ Lovenox and IPCs    ID:  - blood cultures 1/12 growing Enterococcus faecium, repeat blood culture 1/17 NGTD  - lines: iraheta removed, rt IJV (1/6) changed to lt IJV 1/13,  ET 1/6  - Source of bacteremia not identified may be 2/2 abdominal wound (no abscess on CT) or other. ID following and recommend considering MATEO and continuance of Vancomycin, will plan to resume abx    Endo:  - hx RA on chronic prednisone, DM, hypothyroid  - Hydrocortisone changed to home Prednisone dose 10mg Po ngt  - Synthroid 137mcg od through ngt on empty stomach    - Lantus 26 units q hs and Humalog 10units TID AC (prior to bolus feeds) along with low dose corrective scale to cover, monitor sugar levels and adjust insulin accordingly     Lines:  -left IJ central line  -amaury feeding tube  -ET HPI:     62 y/o female with PMH of HTN, DM, hypothyroidism, RA (10mg prednisone), pulmonary HTN, CHRIS, and COPD (3L at home) who was originally admitted on 12/15 for SBO. Underwent ex lap wit lysis of adhesions on 12/16 after which time she was transferred to the SICU for delayed extubation. Was transferred to the floors after extubation when on 12/30 she eviscerated 2/2 a coughing episode, went her second ex lap and again returned to the SICU for delayed extubation. Of note, 10 to 12 retention sutures were placed over the course of the two surgeries. Was then transferred to RCU for multifocal pneumonia where she was placed on BIPAP. There developed ARDS and returned to the SICU where she was re-intubated on 1/6. She has since failed multiple attempts at extubation and both the pt and family (Son, [place], is healthcare proxy) are reportedly refusing trach. Antibiotics were discontinued yesterday, although ID is recommending continued therapy with vancomycin for the next two weeks due to prior blood cultures growing E faecium. The pt and her family are requesting transfer to outside hospital which was not feasible, so MICU was consulted for transfer.      SUBJECTIVE: Patient seen and examined at bedside.     OBJECTIVE:    VITAL SIGNS:  ICU Vital Signs Last 24 Hrs  T(C): 37.8 (22 Jan 2019 08:00), Max: 37.8 (22 Jan 2019 08:00)  T(F): 100 (22 Jan 2019 08:00), Max: 100 (22 Jan 2019 08:00)  HR: 102 (22 Jan 2019 11:01) (88 - 121)  BP: 115/66 (22 Jan 2019 10:00) (102/65 - 158/81)  BP(mean): 76 (22 Jan 2019 10:00) (73 - 103)  ABP: --  ABP(mean): --  RR: 20 (22 Jan 2019 10:00) (16 - 23)  SpO2: 97% (22 Jan 2019 11:01) (90% - 97%)    Mode: CPAP with PS, FiO2: 60, PEEP: 5, PS: 15, MAP: 10, PIP: 22    01-21 @ 07:01  -  01-22 @ 07:00  --------------------------------------------------------  IN: 1890 mL / OUT: 2700 mL / NET: -810 mL    01-22 @ 07:01  - 01-22 @ 13:01  --------------------------------------------------------  IN: 0 mL / OUT: 700 mL / NET: -700 mL      CAPILLARY BLOOD GLUCOSE      POCT Blood Glucose.: 103 mg/dL (22 Jan 2019 06:19)      PHYSICAL EXAM:    General: NAD  HEENT: NC/AT; PERRL, clear conjunctiva  Neck: supple  Respiratory: CTA b/l  Cardiovascular: +S1/S2; RRR  Abdomen: soft, NT/ND; +BS x4  Extremities: WWP, 2+ peripheral pulses b/l; no LE edema  Skin: normal color and turgor; no rash  Neurological:    MEDICATIONS:  MEDICATIONS  (STANDING):  ALBUTerol    90 MICROgram(s) HFA Inhaler 1 Puff(s) Inhalation every 6 hours  amLODIPine   Tablet 10 milliGRAM(s) Oral daily  buDESOnide 160 MICROgram(s)/formoterol 4.5 MICROgram(s) Inhaler 2 Puff(s) Inhalation two times a day  chlorhexidine 0.12% Liquid 15 milliLiter(s) Swish and Spit two times a day  chlorhexidine 4% Liquid 1 Application(s) Topical <User Schedule>  collagenase Ointment 1 Application(s) Topical daily  enoxaparin Injectable 40 milliGRAM(s) SubCutaneous daily  furosemide   Injectable 40 milliGRAM(s) IV Push daily  gabapentin   Solution 400 milliGRAM(s) Oral three times a day  HYDROmorphone PCA (1 mG/mL) 30 milliLiter(s) PCA Continuous PCA Continuous  influenza   Vaccine 0.5 milliLiter(s) IntraMuscular once  insulin glargine Injectable (LANTUS) 26 Unit(s) SubCutaneous at bedtime  insulin lispro (HumaLOG) corrective regimen sliding scale   SubCutaneous every 8 hours  insulin lispro Injectable (HumaLOG) 10 Unit(s) SubCutaneous every 8 hours  levothyroxine 137 MICROGram(s) Oral daily  metoprolol tartrate 25 milliGRAM(s) Enteral Tube two times a day  pantoprazole   Suspension 40 milliGRAM(s) Oral daily  predniSONE   Tablet 10 milliGRAM(s) Oral daily  QUEtiapine 100 milliGRAM(s) Oral at bedtime  simvastatin 20 milliGRAM(s) Oral at bedtime  sodium chloride 3%  Inhalation 3 milliLiter(s) Inhalation two times a day    MEDICATIONS  (PRN):  HYDROmorphone PCA (1 mG/mL) Rescue Clinician Bolus 0.5 milliGRAM(s) IV Push every 15 minutes PRN for Pain Scale GREATER THAN 6  LORazepam     Tablet 0.5 milliGRAM(s) Oral two times a day PRN Anxiety  naloxone Injectable 0.1 milliGRAM(s) IV Push every 3 minutes PRN For ANY of the following changes in patient status:  A. RR LESS THAN 10 breaths per minute, B. Oxygen saturation LESS THAN 90%, C. Sedation score of 6  ondansetron Injectable 4 milliGRAM(s) IV Push every 6 hours PRN Nausea      ALLERGIES:  Allergies    Allergy Status Unknown    Intolerances    Seroquel (Other)      LABS:                        8.5    9.68  )-----------( 397      ( 22 Jan 2019 02:40 )             28.7     Hemoglobin: 8.5 g/dL (01-22 @ 02:40)  Hemoglobin: 8.9 g/dL (01-21 @ 03:00)  Hemoglobin: 8.6 g/dL (01-20 @ 02:50)  Hemoglobin: 8.2 g/dL (01-19 @ 04:30)  Hemoglobin: 9.0 g/dL (01-18 @ 03:00)    CBC Full  -  ( 22 Jan 2019 02:40 )  WBC Count : 9.68 K/uL  Hemoglobin : 8.5 g/dL  Hematocrit : 28.7 %  Platelet Count - Automated : 397 K/uL  Mean Cell Volume : 101.8 fL  Mean Cell Hemoglobin : 30.1 pg  Mean Cell Hemoglobin Concentration : 29.6 %  Auto Neutrophil # : x  Auto Lymphocyte # : x  Auto Monocyte # : x  Auto Eosinophil # : x  Auto Basophil # : x  Auto Neutrophil % : x  Auto Lymphocyte % : x  Auto Monocyte % : x  Auto Eosinophil % : x  Auto Basophil % : x    01-22    145  |  101  |  17  ----------------------------<  153<H>  3.8   |  34<H>  |  0.65    Ca    9.2      22 Jan 2019 02:40  Phos  2.1     01-22  Mg     2.0     01-22    Creatinine Trend: 0.65<--, 0.68<--, 0.74<--, 0.78<--, 0.82<--, 0.85<--    02:40 - VBG - pH: 7.42  | pCO2: 56    | pO2: 36    | Lactate: 2.0        EKG:   MICROBIOLOGY:    IMAGING:    RADIOLOGY & ADDITIONAL TESTS: Reviewed.          Assessment and Plan:    63y Female hx of COPD presented initially with SBO, underwent ex lap 12/15 closed with 4 retention sutures, evicerated on 12/30 and closed with 6 more retention sutures, c/b multifocal pneumonia, developed hypercapnic respiratory failure requiring intubation 1/6, s/p bronch 1/9/18, improving and tolerating CPAP, still intubated, course further c/b Enterococci bacteremia. Transferred to MICU at request of patient/family.     Neuro:  - continue ativan 0.5 mg PO PRN for discomfort/ anxiety  - ct PCA dilaudid, pt with chronic opioid requirement  - ct gabapentin to 400mg tid  - started on Seroquel bedtime for agitation--will d/c due to patient/family request     Resp:  - Intubated, can pull good volumes on PS 5/5 but to avoid increased WOB pt put back on full support  - C/w inhalers, aggressive pulmonary toilet   - encourage OOB to chair   - pulm following, will f/u recs     CV:   - Continue amlodipine 10mg  - Metoprolol 25mg po BID   - Lasix 40 mg IV od, diurese to keep net negative 1 liter everyday, received total 80 mg yd     GI:  - s/p 2*ex-lap, 10 retention sutures in situ, gaping wound covered in fibrinous exudate   - santyl to wound daily  - c/w protonix daily, home med  - Patient on bolus tube feeds at goal, tolerating well    Renal:  - BMP and I&O monitoring as routine, stable BMP   - iraheta removed, patient spontaneously voiding   - Repletion of electrolytes PRN    Heme:  - H/H stable   - VTE ppx w/ Lovenox and IPCs    ID:  - blood cultures 1/12 growing Enterococcus faecium, repeat blood culture 1/17 NGTD  - lines: iraheta removed, rt IJV (1/6) changed to lt IJV 1/13,  ET 1/6  - Source of bacteremia not identified may be 2/2 abdominal wound (no abscess on CT) or other. ID following and recommend considering MATEO and continuance of Vancomycin, will plan to resume abx    Endo:  - hx RA on chronic prednisone, DM, hypothyroid  - Hydrocortisone changed to home Prednisone dose 10mg Po ngt  - Synthroid 137mcg od through ngt on empty stomach    - Lantus 26 units q hs and Humalog 10units TID AC (prior to bolus feeds) along with low dose corrective scale to cover, monitor sugar levels and adjust insulin accordingly     Lines:  -left IJ central line  -amaury feeding tube  -ET HPI:     64 y/o female with PMH of HTN, DM, hypothyroidism, RA (10mg prednisone), pulmonary HTN, CHRIS, and COPD (3L at home) who was originally admitted on 12/15 for SBO. Underwent ex lap wit lysis of adhesions on 12/16 after which time she was transferred to the SICU for delayed extubation. Was transferred to the floors after extubation when on 12/30 she eviscerated 2/2 a coughing episode, went her second ex lap and again returned to the SICU for delayed extubation. Of note, 10 to 12 retention sutures were placed over the course of the two surgeries. Was then transferred to RCU for multifocal pneumonia where she was placed on BIPAP. There developed ARDS and returned to the SICU where she was re-intubated on 1/6. She has since failed multiple attempts at extubation and both the pt and family (Son, [place], is healthcare proxy) are reportedly refusing trach. Antibiotics were discontinued yesterday, although ID is recommending continued therapy with vancomycin for the next two weeks due to prior blood cultures growing E faecium. The pt and her family are requesting transfer to outside hospital which was not feasible, so MICU was consulted for transfer.      SUBJECTIVE: Patient seen and examined at bedside.     OBJECTIVE:    VITAL SIGNS:  ICU Vital Signs Last 24 Hrs  T(C): 37.8 (22 Jan 2019 08:00), Max: 37.8 (22 Jan 2019 08:00)  T(F): 100 (22 Jan 2019 08:00), Max: 100 (22 Jan 2019 08:00)  HR: 102 (22 Jan 2019 11:01) (88 - 121)  BP: 115/66 (22 Jan 2019 10:00) (102/65 - 158/81)  BP(mean): 76 (22 Jan 2019 10:00) (73 - 103)  ABP: --  ABP(mean): --  RR: 20 (22 Jan 2019 10:00) (16 - 23)  SpO2: 97% (22 Jan 2019 11:01) (90% - 97%)    Mode: CPAP with PS, FiO2: 60, PEEP: 5, PS: 15, MAP: 10, PIP: 22    01-21 @ 07:01  -  01-22 @ 07:00  --------------------------------------------------------  IN: 1890 mL / OUT: 2700 mL / NET: -810 mL    01-22 @ 07:01  - 01-22 @ 13:01  --------------------------------------------------------  IN: 0 mL / OUT: 700 mL / NET: -700 mL      CAPILLARY BLOOD GLUCOSE      POCT Blood Glucose.: 103 mg/dL (22 Jan 2019 06:19)      PHYSICAL EXAM:    General: NAD  HEENT: NC/AT; PERRL, clear conjunctiva  Neck: supple  Respiratory: CTA b/l  Cardiovascular: +S1/S2; RRR  Abdomen: soft, NT/ND; +BS x4  Extremities: WWP, 2+ peripheral pulses b/l; no LE edema  Skin: normal color and turgor; no rash  Neurological:    MEDICATIONS:  MEDICATIONS  (STANDING):  ALBUTerol    90 MICROgram(s) HFA Inhaler 1 Puff(s) Inhalation every 6 hours  amLODIPine   Tablet 10 milliGRAM(s) Oral daily  buDESOnide 160 MICROgram(s)/formoterol 4.5 MICROgram(s) Inhaler 2 Puff(s) Inhalation two times a day  chlorhexidine 0.12% Liquid 15 milliLiter(s) Swish and Spit two times a day  chlorhexidine 4% Liquid 1 Application(s) Topical <User Schedule>  collagenase Ointment 1 Application(s) Topical daily  enoxaparin Injectable 40 milliGRAM(s) SubCutaneous daily  furosemide   Injectable 40 milliGRAM(s) IV Push daily  gabapentin   Solution 400 milliGRAM(s) Oral three times a day  HYDROmorphone PCA (1 mG/mL) 30 milliLiter(s) PCA Continuous PCA Continuous  influenza   Vaccine 0.5 milliLiter(s) IntraMuscular once  insulin glargine Injectable (LANTUS) 26 Unit(s) SubCutaneous at bedtime  insulin lispro (HumaLOG) corrective regimen sliding scale   SubCutaneous every 8 hours  insulin lispro Injectable (HumaLOG) 10 Unit(s) SubCutaneous every 8 hours  levothyroxine 137 MICROGram(s) Oral daily  metoprolol tartrate 25 milliGRAM(s) Enteral Tube two times a day  pantoprazole   Suspension 40 milliGRAM(s) Oral daily  predniSONE   Tablet 10 milliGRAM(s) Oral daily  QUEtiapine 100 milliGRAM(s) Oral at bedtime  simvastatin 20 milliGRAM(s) Oral at bedtime  sodium chloride 3%  Inhalation 3 milliLiter(s) Inhalation two times a day    MEDICATIONS  (PRN):  HYDROmorphone PCA (1 mG/mL) Rescue Clinician Bolus 0.5 milliGRAM(s) IV Push every 15 minutes PRN for Pain Scale GREATER THAN 6  LORazepam     Tablet 0.5 milliGRAM(s) Oral two times a day PRN Anxiety  naloxone Injectable 0.1 milliGRAM(s) IV Push every 3 minutes PRN For ANY of the following changes in patient status:  A. RR LESS THAN 10 breaths per minute, B. Oxygen saturation LESS THAN 90%, C. Sedation score of 6  ondansetron Injectable 4 milliGRAM(s) IV Push every 6 hours PRN Nausea      ALLERGIES:  Allergies    Allergy Status Unknown    Intolerances    Seroquel (Other)      LABS:                        8.5    9.68  )-----------( 397      ( 22 Jan 2019 02:40 )             28.7     Hemoglobin: 8.5 g/dL (01-22 @ 02:40)  Hemoglobin: 8.9 g/dL (01-21 @ 03:00)  Hemoglobin: 8.6 g/dL (01-20 @ 02:50)  Hemoglobin: 8.2 g/dL (01-19 @ 04:30)  Hemoglobin: 9.0 g/dL (01-18 @ 03:00)    CBC Full  -  ( 22 Jan 2019 02:40 )  WBC Count : 9.68 K/uL  Hemoglobin : 8.5 g/dL  Hematocrit : 28.7 %  Platelet Count - Automated : 397 K/uL  Mean Cell Volume : 101.8 fL  Mean Cell Hemoglobin : 30.1 pg  Mean Cell Hemoglobin Concentration : 29.6 %  Auto Neutrophil # : x  Auto Lymphocyte # : x  Auto Monocyte # : x  Auto Eosinophil # : x  Auto Basophil # : x  Auto Neutrophil % : x  Auto Lymphocyte % : x  Auto Monocyte % : x  Auto Eosinophil % : x  Auto Basophil % : x    01-22    145  |  101  |  17  ----------------------------<  153<H>  3.8   |  34<H>  |  0.65    Ca    9.2      22 Jan 2019 02:40  Phos  2.1     01-22  Mg     2.0     01-22    Creatinine Trend: 0.65<--, 0.68<--, 0.74<--, 0.78<--, 0.82<--, 0.85<--    02:40 - VBG - pH: 7.42  | pCO2: 56    | pO2: 36    | Lactate: 2.0        EKG:   MICROBIOLOGY:    IMAGING:    RADIOLOGY & ADDITIONAL TESTS: Reviewed.          Assessment and Plan:    63y Female hx of COPD presented initially with SBO, underwent ex lap 12/15 closed with 4 retention sutures, evicerated on 12/30 and closed with 6 more retention sutures, c/b multifocal pneumonia, developed hypercapnic respiratory failure requiring intubation 1/6, s/p bronch 1/9/18, improving and tolerating CPAP, still intubated, course further c/b Enterococci bacteremia. Transferred to MICU at request of patient/family.     Neuro:  - continue ativan 0.5 mg PO PRN for discomfort/ anxiety  - ct PCA dilaudid, pt with chronic opioid requirement  - ct gabapentin to 400mg tid    Resp:  - Intubated, can pull good volumes on PS 5/5 but to avoid increased WOB pt put back on full support  - C/w albuterol and Symbicort   - encourage OOB to chair   - pulm following, will f/u recs     CV:   - Continue amlodipine 10mg  - Metoprolol 25mg po BID   - Lasix 40 mg IV od, diurese to keep net negative 1 liter everyday    GI:  - s/p 2*ex-lap, 10 retention sutures in situ, gaping wound covered in fibrinous exudate   - santyl to wound daily  - c/w protonix daily, home med  - c/w bolus tube feeds    Renal:  - BMP and I&O monitoring as routine, stable BMP   - iraheta removed, patient spontaneously voiding   - Repletion of electrolytes PRN    Heme:  - H/H stable   - VTE ppx w/ Lovenox and IPCs    ID:  - blood cultures 1/12 growing Enterococcus faecium, repeat blood culture 1/17 NGTD  - lines: iraheta removed, rt IJV (1/6) changed to lt IJV 1/13,  ET 1/6  - Source of bacteremia not identified may be 2/2 abdominal wound (no abscess on CT) or other. ID following and recommend considering MATEO and continuance of Vancomycin, will plan to resume abx  - c/w vancomycin 1g Q12hrs, consult ID to determine length of course    Endo:  - hx RA on chronic prednisone, DM, hypothyroid  - Hydrocortisone changed to home Prednisone dose 10mg Po ngt  - Synthroid 137mcg od through ngt on empty stomach    - Lantus 26 units q hs and Humalog 10units TID AC (prior to bolus feeds) along with low dose corrective scale to cover, monitor sugar levels and adjust insulin accordingly     Lines:  -left IJ central line  -amaury feeding tube  -ET HPI:     62 y/o female with PMH of HTN, DM, hypothyroidism, RA (10mg prednisone), pulmonary HTN, CHRIS, and COPD (3L at home) who was originally admitted on 12/15 for SBO. Underwent ex lap wit lysis of adhesions on 12/16 after which time she was transferred to the SICU for delayed extubation. Was transferred to the floors after extubation when on 12/30 she eviscerated 2/2 a coughing episode, went her second ex lap and again returned to the SICU for delayed extubation. Of note, 10 to 12 retention sutures were placed over the course of the two surgeries. Was then transferred to RCU for multifocal pneumonia where she was placed on BIPAP. There developed ARDS and returned to the SICU where she was re-intubated on 1/6. She has since failed multiple attempts at extubation and both the pt and family (Son, [place], is healthcare proxy) are reportedly refusing trach. Antibiotics were discontinued yesterday, although ID is recommending continued therapy with vancomycin for the next two weeks due to prior blood cultures growing E faecium. The pt and her family are requesting transfer to outside hospital which was not feasible, so MICU was consulted for transfer.      SUBJECTIVE: Patient seen and examined at bedside.     OBJECTIVE:    VITAL SIGNS:  ICU Vital Signs Last 24 Hrs  T(C): 37.8 (22 Jan 2019 08:00), Max: 37.8 (22 Jan 2019 08:00)  T(F): 100 (22 Jan 2019 08:00), Max: 100 (22 Jan 2019 08:00)  HR: 102 (22 Jan 2019 11:01) (88 - 121)  BP: 115/66 (22 Jan 2019 10:00) (102/65 - 158/81)  BP(mean): 76 (22 Jan 2019 10:00) (73 - 103)  ABP: --  ABP(mean): --  RR: 20 (22 Jan 2019 10:00) (16 - 23)  SpO2: 97% (22 Jan 2019 11:01) (90% - 97%)    Mode: CPAP with PS, FiO2: 60, PEEP: 5, PS: 15, MAP: 10, PIP: 22    01-21 @ 07:01  -  01-22 @ 07:00  --------------------------------------------------------  IN: 1890 mL / OUT: 2700 mL / NET: -810 mL    01-22 @ 07:01  - 01-22 @ 13:01  --------------------------------------------------------  IN: 0 mL / OUT: 700 mL / NET: -700 mL      CAPILLARY BLOOD GLUCOSE      POCT Blood Glucose.: 103 mg/dL (22 Jan 2019 06:19)      PHYSICAL EXAM:    General: NAD  HEENT: NC/AT; PERRL, clear conjunctiva  Neck: supple  Respiratory: CTA b/l  Cardiovascular: +S1/S2; RRR  Abdomen: soft, NT/ND; +BS x4  Extremities: WWP, 2+ peripheral pulses b/l; no LE edema  Skin: normal color and turgor; no rash  Neurological:    MEDICATIONS:  MEDICATIONS  (STANDING):  ALBUTerol    90 MICROgram(s) HFA Inhaler 1 Puff(s) Inhalation every 6 hours  amLODIPine   Tablet 10 milliGRAM(s) Oral daily  buDESOnide 160 MICROgram(s)/formoterol 4.5 MICROgram(s) Inhaler 2 Puff(s) Inhalation two times a day  chlorhexidine 0.12% Liquid 15 milliLiter(s) Swish and Spit two times a day  chlorhexidine 4% Liquid 1 Application(s) Topical <User Schedule>  collagenase Ointment 1 Application(s) Topical daily  enoxaparin Injectable 40 milliGRAM(s) SubCutaneous daily  furosemide   Injectable 40 milliGRAM(s) IV Push daily  gabapentin   Solution 400 milliGRAM(s) Oral three times a day  HYDROmorphone PCA (1 mG/mL) 30 milliLiter(s) PCA Continuous PCA Continuous  influenza   Vaccine 0.5 milliLiter(s) IntraMuscular once  insulin glargine Injectable (LANTUS) 26 Unit(s) SubCutaneous at bedtime  insulin lispro (HumaLOG) corrective regimen sliding scale   SubCutaneous every 8 hours  insulin lispro Injectable (HumaLOG) 10 Unit(s) SubCutaneous every 8 hours  levothyroxine 137 MICROGram(s) Oral daily  metoprolol tartrate 25 milliGRAM(s) Enteral Tube two times a day  pantoprazole   Suspension 40 milliGRAM(s) Oral daily  predniSONE   Tablet 10 milliGRAM(s) Oral daily  QUEtiapine 100 milliGRAM(s) Oral at bedtime  simvastatin 20 milliGRAM(s) Oral at bedtime  sodium chloride 3%  Inhalation 3 milliLiter(s) Inhalation two times a day    MEDICATIONS  (PRN):  HYDROmorphone PCA (1 mG/mL) Rescue Clinician Bolus 0.5 milliGRAM(s) IV Push every 15 minutes PRN for Pain Scale GREATER THAN 6  LORazepam     Tablet 0.5 milliGRAM(s) Oral two times a day PRN Anxiety  naloxone Injectable 0.1 milliGRAM(s) IV Push every 3 minutes PRN For ANY of the following changes in patient status:  A. RR LESS THAN 10 breaths per minute, B. Oxygen saturation LESS THAN 90%, C. Sedation score of 6  ondansetron Injectable 4 milliGRAM(s) IV Push every 6 hours PRN Nausea      ALLERGIES:  Allergies    Allergy Status Unknown    Intolerances    Seroquel (Other)      LABS:                        8.5    9.68  )-----------( 397      ( 22 Jan 2019 02:40 )             28.7     Hemoglobin: 8.5 g/dL (01-22 @ 02:40)  Hemoglobin: 8.9 g/dL (01-21 @ 03:00)  Hemoglobin: 8.6 g/dL (01-20 @ 02:50)  Hemoglobin: 8.2 g/dL (01-19 @ 04:30)  Hemoglobin: 9.0 g/dL (01-18 @ 03:00)    CBC Full  -  ( 22 Jan 2019 02:40 )  WBC Count : 9.68 K/uL  Hemoglobin : 8.5 g/dL  Hematocrit : 28.7 %  Platelet Count - Automated : 397 K/uL  Mean Cell Volume : 101.8 fL  Mean Cell Hemoglobin : 30.1 pg  Mean Cell Hemoglobin Concentration : 29.6 %  Auto Neutrophil # : x  Auto Lymphocyte # : x  Auto Monocyte # : x  Auto Eosinophil # : x  Auto Basophil # : x  Auto Neutrophil % : x  Auto Lymphocyte % : x  Auto Monocyte % : x  Auto Eosinophil % : x  Auto Basophil % : x    01-22    145  |  101  |  17  ----------------------------<  153<H>  3.8   |  34<H>  |  0.65    Ca    9.2      22 Jan 2019 02:40  Phos  2.1     01-22  Mg     2.0     01-22    Creatinine Trend: 0.65<--, 0.68<--, 0.74<--, 0.78<--, 0.82<--, 0.85<--    02:40 - VBG - pH: 7.42  | pCO2: 56    | pO2: 36    | Lactate: 2.0        EKG:   MICROBIOLOGY:    IMAGING:    RADIOLOGY & ADDITIONAL TESTS: Reviewed.          Assessment and Plan:    63y Female hx of COPD presented initially with SBO, underwent ex lap 12/15 closed with 4 retention sutures, evicerated on 12/30 and closed with 6 more retention sutures, c/b multifocal pneumonia, developed hypercapnic respiratory failure requiring intubation 1/6, s/p bronch 1/9/18, improving and tolerating CPAP, still intubated, course further c/b Enterococci bacteremia. Transferred to MICU at request of patient/family.     Neuro:  - continue ativan 0.5 mg PO PRN for discomfort/ anxiety  - ct PCA dilaudid, pt with chronic opioid requirement  - ct gabapentin to 400mg tid    Resp:  - Intubated, can pull good volumes on PS 5/5 but to avoid increased WOB pt put back on full support  - C/w albuterol and Symbicort   - encourage OOB to chair   - pulm following, will f/u recs     CV:   - Continue amlodipine 10mg  - Metoprolol 25mg po BID   - Lasix 40 mg IV od, diurese to keep net negative 1 liter everyday    GI:  - s/p 2*ex-lap, 10 retention sutures in situ, gaping wound covered in fibrinous exudate   - santyl to wound daily  - c/w protonix daily, home med  - c/w bolus tube feeds    Renal:  - BMP and I&O monitoring as routine, stable BMP   - iraheta removed, patient spontaneously voiding   - Repletion of electrolytes PRN    Heme:  - H/H stable   - VTE ppx w/ Lovenox and IPCs    ID:  - blood cultures 1/12 growing Enterococcus faecium, repeat blood culture 1/17 NGTD  - lines: iraheta removed, rt IJV (1/6) changed to lt IJV 1/13,  ET 1/6  - Source of bacteremia not identified may be 2/2 abdominal wound (no abscess on CT) or other. ID following and recommend considering MATEO and continuance of Vancomycin, will plan to resume abx  - c/w vancomycin 1g Q12hrs, consult ID to determine length of course    Endo:  - hx RA on chronic prednisone, DM, hypothyroid  - Hydrocortisone changed to home Prednisone dose 10mg Po ngt  - Synthroid 137mcg od through ngt on empty stomach    - Lantus 26 units q hs and Humalog 10units TID AC (prior to bolus feeds) along with low dose corrective scale to cover, monitor sugar levels and adjust insulin accordingly     Lines:  -left IJ central line  -amaury feeding tube  -ET    Attending note:   63 year old woman with HTN, DM, hypothyroidism, RA (10mg prednisone), pulmonary HTN, CHRIS, and COPD (3L at home) who was originally admitted on 12/15 for SBO. s/p two abdominal surgeries now with retention sutures in place, has had bacteremia and has failed attempts at weaning ventilator transferred to MICU for further vent management. Awaiting possible transfer to [facility].

## 2019-01-22 NOTE — OCCUPATIONAL THERAPY INITIAL EVALUATION ADULT - PERTINENT HX OF CURRENT PROBLEM, REHAB EVAL
63 F with hx COPD (3L O2), DM, RA, pulm HTN, c-s x2 presented on 12/15 with SBO concerning for compromised bowel. S/p ex-lap on 12/16, UTI (Abx -12/31), now s/p ex-lap 12/30 after eviscerated w/ findings of intact bowel, closed w/ retention sutures. Pt was intubated post-op, now s/p extubation on 12/31, c/b multiple febrile episodes. CT Chest/Abdomen/Pelvis revealed PNA. Pt later transfered to SICU for worsening respiratory status and re-intubated for ARDS.

## 2019-01-22 NOTE — PROGRESS NOTE ADULT - ASSESSMENT
63F w/ multiple respiratory comorbidities p/w SBO s/p ex-lap with findings of healthy bowel c/b evisceration of bowel s/p ex-lap with placement of retention sutures, now with worsening separation at skin edges requiring SICU level care including intubation for worsed respiratory status.    Plan  - Pain control: gabapentin 400mg via NGT 3x daily, dilaudid PCA  - NPO w/ tube feeds  - Monitor GI fxn   - Monitor respiratory status, wean as tolerated  - DVT ppx: Lovenox  - Per family and patient's wishes, planning for transfer to [facility] is in progress      - awaiting insurance approval for transfer  - Follow care plan per SICU team

## 2019-01-22 NOTE — OCCUPATIONAL THERAPY INITIAL EVALUATION ADULT - GENERAL OBSERVATIONS, REHAB EVAL
Pt received in semisupine position. Pt received in semisupine position. Pt intubated but alert and following commands.

## 2019-01-22 NOTE — PROGRESS NOTE ADULT - SUBJECTIVE AND OBJECTIVE BOX
SICU Daily Progress Note  =====================================================    63y Female with pmh COPD, DM, RA, hypothyroid, pulm HTN, CHRIS, HTN admitted on 12/15 for SBO, underwent exploratory laparotomy and adhesiolysis, no transition point was noted. pt transferred to SICU for intentional delayed extubation post op due to pulm comorbidities.   transferred to floors after extubation, eviscerated on 12/30 after having violent coughing episode, RTOR, returned to SICU for intentional delayed extubation. Pt transferred to RCU as developed viral multifocal pneumonia, returned 1/6 for worsening respiratory distress, intubated in SICU and remains intubated till date. Currently course c/b E faecium bacteremia on abx and ID following. Family requesting transfer out, pending pre-authorization from [facility].     Interval/Overnight Events:  Patient placed back on full PS ventilation for increased WOB with PS 5/5. Net negative 660cc 1/22. Restarted on home Prednisone dose. Vancomycin discontinued. Seroquel added at bedtime.    Allergies: No Known Allergies      MEDICATIONS:   --------------------------------------------------------------------------------------  Neurologic Medications  gabapentin   Solution 400 milliGRAM(s) Oral three times a day  HYDROmorphone PCA (1 mG/mL) 30 milliLiter(s) PCA Continuous PCA Continuous  HYDROmorphone PCA (1 mG/mL) Rescue Clinician Bolus 0.5 milliGRAM(s) IV Push every 15 minutes PRN for Pain Scale GREATER THAN 6  LORazepam     Tablet 0.5 milliGRAM(s) Oral two times a day PRN Anxiety  ondansetron Injectable 4 milliGRAM(s) IV Push every 6 hours PRN Nausea  QUEtiapine 100 milliGRAM(s) Oral at bedtime    Respiratory Medications  ALBUTerol    90 MICROgram(s) HFA Inhaler 1 Puff(s) Inhalation every 6 hours  buDESOnide 160 MICROgram(s)/formoterol 4.5 MICROgram(s) Inhaler 2 Puff(s) Inhalation two times a day  sodium chloride 3%  Inhalation 3 milliLiter(s) Inhalation two times a day    Cardiovascular Medications  amLODIPine   Tablet 10 milliGRAM(s) Oral daily  furosemide   Injectable 40 milliGRAM(s) IV Push daily  metoprolol tartrate 25 milliGRAM(s) Enteral Tube two times a day    Gastrointestinal Medications  pantoprazole   Suspension 40 milliGRAM(s) Oral daily    Genitourinary Medications    Hematologic/Oncologic Medications  enoxaparin Injectable 40 milliGRAM(s) SubCutaneous daily  influenza   Vaccine 0.5 milliLiter(s) IntraMuscular once    Antimicrobial/Immunologic Medications    Endocrine/Metabolic Medications  insulin glargine Injectable (LANTUS) 26 Unit(s) SubCutaneous at bedtime  insulin lispro (HumaLOG) corrective regimen sliding scale   SubCutaneous every 8 hours  insulin lispro Injectable (HumaLOG) 10 Unit(s) SubCutaneous every 8 hours  levothyroxine 137 MICROGram(s) Oral daily  predniSONE   Tablet 10 milliGRAM(s) Oral daily  simvastatin 20 milliGRAM(s) Oral at bedtime    Topical/Other Medications  chlorhexidine 0.12% Liquid 15 milliLiter(s) Swish and Spit two times a day  chlorhexidine 4% Liquid 1 Application(s) Topical <User Schedule>  collagenase Ointment 1 Application(s) Topical daily  naloxone Injectable 0.1 milliGRAM(s) IV Push every 3 minutes PRN For ANY of the following changes in patient status:  A. RR LESS THAN 10 breaths per minute, B. Oxygen saturation LESS THAN 90%, C. Sedation score of 6    --------------------------------------------------------------------------------------    EXAM  NEUROLOGY  RASS: 0-1  Exam: Normal, NAD, alert, oriented x3, no focal deficits    HEENT  Exam: Normocephalic, atraumatic, intubated    RESPIRATORY  Exam: Lungs clear to auscultation, mildly decreased at bases, secretions well controlled, no wheezing  Mechanical Ventilation: Mode: CPAP with PS, FiO2: 60, PEEP: 5, PS: 15, MAP: 10.1, PIP: 22    CARDIOVASCULAR  Exam: S1, S2.  Tachycardia  Perfusion     [x]Adequate   [ ]Inadequate  Mentation   [x]Normal       [ ]Reduced  Extremities  [x]Warm         [ ]Cool  Volume Status [ ]Hypervolemic [x]Euvolemic [ ]Hypovolemic    GI/NUTRITION  Exam: soft, tender around incision, nondistended, incision with fibrinous exudate  Current Diet:     VASCULAR  Exam: Extremities warm, pink, well-perfused    MUSCULOSKELETAL  Exam: All extremities moving spontaneously without limitations    HEMATOLOGIC  [x] VTE Prophylaxis: enoxaparin Injectable 40 milliGRAM(s) SubCutaneous daily      INFECTIOUS DISEASE  Antimicrobials/Immunologic Medications:  influenza   Vaccine 0.5 milliLiter(s) IntraMuscular once      Tubes/Lines/Drains   [x] Peripheral IV  [X] Central Venous Line     	[] R	[X] L	[X] IJ	[] Fem	[] SC	Date Placed:   [] Arterial Line		[] R	[] L	[] Fem	[] Rad	[] Ax	Date Placed:   [] PICC		[] Midline		[] Mediport  [] Urinary Catheter		Date Placed:   [x] Necessity of urinary, arterial, and venous catheters discussed SICU Daily Progress Note  =====================================================    63y Female with pmh COPD, DM, RA, hypothyroid, pulm HTN, CHRIS, HTN admitted on 12/15 for SBO, underwent exploratory laparotomy and adhesiolysis, no transition point was noted. pt transferred to SICU for intentional delayed extubation post op due to pulm comorbidities.   transferred to floors after extubation, eviscerated on 12/30 after having violent coughing episode, RTOR, returned to SICU for intentional delayed extubation. Pt transferred to RCU as developed viral multifocal pneumonia, returned 1/6 for worsening respiratory distress, intubated in SICU and remains intubated till date. Currently course c/b E faecium bacteremia on abx and ID following. Family requesting transfer out, pending pre-authorization from [facility].     Interval/Overnight Events:  Patient placed back on full PS ventilation to avoid increased WOB with PS 5/5, otherwise pt pulling good volume with 5/5 and mentating well.  Net negative 660cc 1/22. Restarted on home Prednisone dose yd. Vancomycin discontinued. Seroquel added at bedtime.    Allergies: No Known Allergies      MEDICATIONS:   --------------------------------------------------------------------------------------  Neurologic Medications  gabapentin   Solution 400 milliGRAM(s) Oral three times a day  HYDROmorphone PCA (1 mG/mL) 30 milliLiter(s) PCA Continuous PCA Continuous  HYDROmorphone PCA (1 mG/mL) Rescue Clinician Bolus 0.5 milliGRAM(s) IV Push every 15 minutes PRN for Pain Scale GREATER THAN 6  LORazepam     Tablet 0.5 milliGRAM(s) Oral two times a day PRN Anxiety  ondansetron Injectable 4 milliGRAM(s) IV Push every 6 hours PRN Nausea  QUEtiapine 100 milliGRAM(s) Oral at bedtime    Respiratory Medications  ALBUTerol    90 MICROgram(s) HFA Inhaler 1 Puff(s) Inhalation every 6 hours  buDESOnide 160 MICROgram(s)/formoterol 4.5 MICROgram(s) Inhaler 2 Puff(s) Inhalation two times a day  sodium chloride 3%  Inhalation 3 milliLiter(s) Inhalation two times a day    Cardiovascular Medications  amLODIPine   Tablet 10 milliGRAM(s) Oral daily  furosemide   Injectable 40 milliGRAM(s) IV Push daily  metoprolol tartrate 25 milliGRAM(s) Enteral Tube two times a day    Gastrointestinal Medications  pantoprazole   Suspension 40 milliGRAM(s) Oral daily    Genitourinary Medications    Hematologic/Oncologic Medications  enoxaparin Injectable 40 milliGRAM(s) SubCutaneous daily  influenza   Vaccine 0.5 milliLiter(s) IntraMuscular once    Antimicrobial/Immunologic Medications    Endocrine/Metabolic Medications  insulin glargine Injectable (LANTUS) 26 Unit(s) SubCutaneous at bedtime  insulin lispro (HumaLOG) corrective regimen sliding scale   SubCutaneous every 8 hours  insulin lispro Injectable (HumaLOG) 10 Unit(s) SubCutaneous every 8 hours  levothyroxine 137 MICROGram(s) Oral daily  predniSONE   Tablet 10 milliGRAM(s) Oral daily  simvastatin 20 milliGRAM(s) Oral at bedtime    Topical/Other Medications  chlorhexidine 0.12% Liquid 15 milliLiter(s) Swish and Spit two times a day  chlorhexidine 4% Liquid 1 Application(s) Topical <User Schedule>  collagenase Ointment 1 Application(s) Topical daily  naloxone Injectable 0.1 milliGRAM(s) IV Push every 3 minutes PRN For ANY of the following changes in patient status:  A. RR LESS THAN 10 breaths per minute, B. Oxygen saturation LESS THAN 90%, C. Sedation score of 6    --------------------------------------------------------------------------------------    EXAM  NEUROLOGY  RASS: 0-1  Exam: Normal, NAD, alert, oriented x3, no focal deficits    HEENT  Exam: Normocephalic, atraumatic, intubated    RESPIRATORY    RR: 20 (22 Jan 2019 06:00) (16 - 22)  SpO2: 95% (22 Jan 2019 06:00) (90% - 97%)    Mode: CPAP with PS  FiO2: 60  PEEP: 5  PS: 15  MAP: 10  PIP: 22    Exam: Lungs clear to auscultation, mildly decreased at bases, secretions well controlled, no wheezing      CARDIOVASCULAR  HR: 103 (22 Jan 2019 06:00) (88 - 121)  BP: 125/74 (22 Jan 2019 06:00) (102/65 - 158/81)  BP(mean): 86 (22 Jan 2019 06:00) (73 - 106)  ABP: --  ABP(mean): --  Exam: S1, S2.  Tachycardia  Perfusion     [x]Adequate   [ ]Inadequate  Mentation   [x]Normal       [ ]Reduced  Extremities  [x]Warm         [ ]Cool  Volume Status [x ]Hypervolemic []Euvolemic [ ]Hypovolemic    GI/NUTRITION  Exam: soft, tender around incision, nondistended, incision with fibrinous exudate    Diet, NPO with Tube Feed:   Tube Feeding Modality: Nasogastric  Glucerna 1.2 Dewey (GLUCERNARTH)  Total Volume for 24 Hours (mL): 1200  Bolus   Total Volume of Bolus (mL):  400  Bolus Feed Rate (mL per Hour): 400   Bolus Feed Duration (in Hours): 1  Tube Feed Frequency: Every 8 hours   Tube Feed Start Time: 20:00  No Carb Prosource (1pkg = 15gms Protein)     Qty per Day:  2 (01-16-19 @ 15:45)      VASCULAR  Exam: Extremities warm, pink, well-perfused    MUSCULOSKELETAL  Exam: All extremities moving spontaneously without limitations    HEMATOLOGIC                        8.5    9.68  )-----------( 397      ( 22 Jan 2019 02:40 )             28.7     [x] VTE Prophylaxis: enoxaparin Injectable 40 milliGRAM(s) SubCutaneous daily      INFECTIOUS DISEASE  T(C): 37.6 (22 Jan 2019 04:00), Max: 37.7 (22 Jan 2019 00:00)  T(F): 99.6 (22 Jan 2019 04:00), Max: 99.8 (22 Jan 2019 00:00)    WBC Count: 9.68 K/uL (01-22 @ 02:40)  WBC Count: 10.07 K/uL (01-21 @ 03:00)  WBC Count: 9.44 K/uL (01-20 @ 02:50)  WBC Count: 10.16 K/uL (01-19 @ 04:30)  WBC Count: 13.79 K/uL (01-18 @ 03:00)    RECENT CULTURES:  01-17 @ 10:09 BLOOD VENOUS         NO ORGANISMS ISOLATED  NO ORGANISMS ISOLATED AT 96 HOURS    01-12 @ 21:49 OTHER Enterococcus faecalis  Corynebacterium species          01-11 @ 21:31 BLOOD PERIPHERAL BLOOD CULTURE PCR  Enterococcus faecium        ***Blood Panel PCR results on this specimen are available  approximately 3 hours after the Gram stain result***  Gram stain, PCR, and/or culture results may not always  correspond due to difference in methodologies  ------------------------------------------------------------  This PCR assay was performed using [employer].  The  following targets are tested for:  Enterococcus, vancomycin  resistant enterococci, Listeria monocytogenes,  coagulase  negative staphylococci, S. aureus, methicillin resistant S.  aureus, Streptococcus agalactiae (Group B), S. pneumoniae,  S. pyogenes (Group A), Acinetobacter baumannii, Enterobacter  cloacae, E. coli, Klebsiella oxytoca, K. pneumoniae, Proteus  sp., Serratia marcescens, Haemophilus influenzae, Neisseria  meningitidis, Pseudomonas aeruginosa, Candida albicans, C.  glabrata, C. krusei, C. parapsilosis, C. tropicalis and the  KPC resistance gene.  **NOTE: Due to technical problems, Proteus sp. will NOT be  reported as part of the BCID paneluntil further notice.    01-11 @ 20:25 BLOOD VENOUS         NO ORGANISMS ISOLATED    01-09 @ 12:31 BRONCHIAL LAVAGE       Antimicrobials/Immunologic Medications:  influenza   Vaccine 0.5 milliLiter(s) IntraMuscular once      Tubes/Lines/Drains   [] Peripheral IV  [X] Central Venous Line     	[] R	[X] L	[X] IJ	[] Fem	[] SC	Date Placed:   [] Arterial Line		[] R	[] L	[] Fem	[] Rad	[] Ax	Date Placed:   [] PICC		[] Midline		[] Mediport  [] Urinary Catheter		Date Placed:   [x] Necessity of urinary, arterial, and venous catheters discussed

## 2019-01-22 NOTE — OCCUPATIONAL THERAPY INITIAL EVALUATION ADULT - RANGE OF MOTION EXAMINATION, UPPER EXTREMITY
except bilateral shoulders (0-90 degrees)/bilateral UE Active Assistive ROM was WFL  (within functional limits)

## 2019-01-23 LAB
ANION GAP SERPL CALC-SCNC: 12 MMO/L — SIGNIFICANT CHANGE UP (ref 7–14)
BASE EXCESS BLDA CALC-SCNC: 8.9 MMOL/L — SIGNIFICANT CHANGE UP
BASOPHILS # BLD AUTO: 0.07 K/UL — SIGNIFICANT CHANGE UP (ref 0–0.2)
BASOPHILS NFR BLD AUTO: 0.8 % — SIGNIFICANT CHANGE UP (ref 0–2)
BLD GP AB SCN SERPL QL: NEGATIVE — SIGNIFICANT CHANGE UP
BUN SERPL-MCNC: 18 MG/DL — SIGNIFICANT CHANGE UP (ref 7–23)
CA-I BLDA-SCNC: 1.2 MMOL/L — SIGNIFICANT CHANGE UP (ref 1.15–1.29)
CALCIUM SERPL-MCNC: 8.9 MG/DL — SIGNIFICANT CHANGE UP (ref 8.4–10.5)
CHLORIDE SERPL-SCNC: 101 MMOL/L — SIGNIFICANT CHANGE UP (ref 98–107)
CO2 SERPL-SCNC: 31 MMOL/L — SIGNIFICANT CHANGE UP (ref 22–31)
CREAT SERPL-MCNC: 0.71 MG/DL — SIGNIFICANT CHANGE UP (ref 0.5–1.3)
EOSINOPHIL # BLD AUTO: 0.36 K/UL — SIGNIFICANT CHANGE UP (ref 0–0.5)
EOSINOPHIL NFR BLD AUTO: 4 % — SIGNIFICANT CHANGE UP (ref 0–6)
GLUCOSE BLDA-MCNC: 143 MG/DL — HIGH (ref 70–99)
GLUCOSE BLDC GLUCOMTR-MCNC: 117 MG/DL — HIGH (ref 70–99)
GLUCOSE BLDC GLUCOMTR-MCNC: 143 MG/DL — HIGH (ref 70–99)
GLUCOSE BLDC GLUCOMTR-MCNC: 165 MG/DL — HIGH (ref 70–99)
GLUCOSE BLDC GLUCOMTR-MCNC: 92 MG/DL — SIGNIFICANT CHANGE UP (ref 70–99)
GLUCOSE SERPL-MCNC: 139 MG/DL — HIGH (ref 70–99)
HCO3 BLDA-SCNC: 31 MMOL/L — HIGH (ref 22–26)
HCT VFR BLD CALC: 30.3 % — LOW (ref 34.5–45)
HCT VFR BLDA CALC: 26.7 % — LOW (ref 34.5–46.5)
HGB BLD-MCNC: 8.7 G/DL — LOW (ref 11.5–15.5)
HGB BLDA-MCNC: 8.6 G/DL — LOW (ref 11.5–15.5)
IMM GRANULOCYTES NFR BLD AUTO: 1.9 % — HIGH (ref 0–1.5)
LACTATE BLDA-SCNC: 1.8 MMOL/L — SIGNIFICANT CHANGE UP (ref 0.5–2)
LYMPHOCYTES # BLD AUTO: 2.22 K/UL — SIGNIFICANT CHANGE UP (ref 1–3.3)
LYMPHOCYTES # BLD AUTO: 24.7 % — SIGNIFICANT CHANGE UP (ref 13–44)
MAGNESIUM SERPL-MCNC: 2.2 MG/DL — SIGNIFICANT CHANGE UP (ref 1.6–2.6)
MCHC RBC-ENTMCNC: 28.7 % — LOW (ref 32–36)
MCHC RBC-ENTMCNC: 29.8 PG — SIGNIFICANT CHANGE UP (ref 27–34)
MCV RBC AUTO: 103.8 FL — HIGH (ref 80–100)
MONOCYTES # BLD AUTO: 1.11 K/UL — HIGH (ref 0–0.9)
MONOCYTES NFR BLD AUTO: 12.4 % — SIGNIFICANT CHANGE UP (ref 2–14)
NEUTROPHILS # BLD AUTO: 5.05 K/UL — SIGNIFICANT CHANGE UP (ref 1.8–7.4)
NEUTROPHILS NFR BLD AUTO: 56.2 % — SIGNIFICANT CHANGE UP (ref 43–77)
NRBC # FLD: 0.18 K/UL — LOW (ref 25–125)
NRBC FLD-RTO: 2 — SIGNIFICANT CHANGE UP
PCO2 BLDA: 64 MMHG — HIGH (ref 32–48)
PH BLDA: 7.35 PH — SIGNIFICANT CHANGE UP (ref 7.35–7.45)
PHOSPHATE SERPL-MCNC: 3.5 MG/DL — SIGNIFICANT CHANGE UP (ref 2.5–4.5)
PLATELET # BLD AUTO: 450 K/UL — HIGH (ref 150–400)
PMV BLD: 9.7 FL — SIGNIFICANT CHANGE UP (ref 7–13)
PO2 BLDA: 34 MMHG — CRITICAL LOW (ref 83–108)
POTASSIUM BLDA-SCNC: 3.7 MMOL/L — SIGNIFICANT CHANGE UP (ref 3.4–4.5)
POTASSIUM SERPL-MCNC: 3.9 MMOL/L — SIGNIFICANT CHANGE UP (ref 3.5–5.3)
POTASSIUM SERPL-SCNC: 3.9 MMOL/L — SIGNIFICANT CHANGE UP (ref 3.5–5.3)
RBC # BLD: 2.92 M/UL — LOW (ref 3.8–5.2)
RBC # FLD: 17.7 % — HIGH (ref 10.3–14.5)
RH IG SCN BLD-IMP: POSITIVE — SIGNIFICANT CHANGE UP
SAO2 % BLDA: 54.1 % — LOW (ref 95–99)
SODIUM BLDA-SCNC: 142 MMOL/L — SIGNIFICANT CHANGE UP (ref 136–146)
SODIUM SERPL-SCNC: 144 MMOL/L — SIGNIFICANT CHANGE UP (ref 135–145)
WBC # BLD: 8.98 K/UL — SIGNIFICANT CHANGE UP (ref 3.8–10.5)
WBC # FLD AUTO: 8.98 K/UL — SIGNIFICANT CHANGE UP (ref 3.8–10.5)

## 2019-01-23 PROCEDURE — 99291 CRITICAL CARE FIRST HOUR: CPT

## 2019-01-23 PROCEDURE — 99233 SBSQ HOSP IP/OBS HIGH 50: CPT

## 2019-01-23 PROCEDURE — 71045 X-RAY EXAM CHEST 1 VIEW: CPT | Mod: 26

## 2019-01-23 RX ORDER — ROCURONIUM BROMIDE 10 MG/ML
120 VIAL (ML) INTRAVENOUS ONCE
Qty: 0 | Refills: 0 | Status: DISCONTINUED | OUTPATIENT
Start: 2019-01-23 | End: 2019-01-23

## 2019-01-23 RX ORDER — MIDAZOLAM HYDROCHLORIDE 1 MG/ML
4 INJECTION, SOLUTION INTRAMUSCULAR; INTRAVENOUS ONCE
Qty: 0 | Refills: 0 | Status: DISCONTINUED | OUTPATIENT
Start: 2019-01-23 | End: 2019-01-23

## 2019-01-23 RX ORDER — PROPOFOL 10 MG/ML
20 INJECTION, EMULSION INTRAVENOUS
Qty: 1000 | Refills: 0 | Status: DISCONTINUED | OUTPATIENT
Start: 2019-01-23 | End: 2019-01-24

## 2019-01-23 RX ORDER — FUROSEMIDE 40 MG
40 TABLET ORAL ONCE
Qty: 0 | Refills: 0 | Status: COMPLETED | OUTPATIENT
Start: 2019-01-23 | End: 2019-01-23

## 2019-01-23 RX ORDER — CISATRACURIUM BESYLATE 2 MG/ML
20 INJECTION INTRAVENOUS ONCE
Qty: 0 | Refills: 0 | Status: COMPLETED | OUTPATIENT
Start: 2019-01-23 | End: 2019-01-23

## 2019-01-23 RX ORDER — PROPOFOL 10 MG/ML
10 INJECTION, EMULSION INTRAVENOUS ONCE
Qty: 0 | Refills: 0 | Status: COMPLETED | OUTPATIENT
Start: 2019-01-23 | End: 2019-01-23

## 2019-01-23 RX ORDER — MIDAZOLAM HYDROCHLORIDE 1 MG/ML
10 INJECTION, SOLUTION INTRAMUSCULAR; INTRAVENOUS ONCE
Qty: 0 | Refills: 0 | Status: DISCONTINUED | OUTPATIENT
Start: 2019-01-23 | End: 2019-01-23

## 2019-01-23 RX ORDER — MIDAZOLAM HYDROCHLORIDE 1 MG/ML
2 INJECTION, SOLUTION INTRAMUSCULAR; INTRAVENOUS ONCE
Qty: 0 | Refills: 0 | Status: DISCONTINUED | OUTPATIENT
Start: 2019-01-23 | End: 2019-01-23

## 2019-01-23 RX ORDER — FENTANYL CITRATE 50 UG/ML
100 INJECTION INTRAVENOUS ONCE
Qty: 0 | Refills: 0 | Status: DISCONTINUED | OUTPATIENT
Start: 2019-01-23 | End: 2019-01-23

## 2019-01-23 RX ORDER — ENOXAPARIN SODIUM 100 MG/ML
40 INJECTION SUBCUTANEOUS
Qty: 0 | Refills: 0 | Status: DISCONTINUED | OUTPATIENT
Start: 2019-01-23 | End: 2019-01-30

## 2019-01-23 RX ADMIN — CHLORHEXIDINE GLUCONATE 1 APPLICATION(S): 213 SOLUTION TOPICAL at 10:16

## 2019-01-23 RX ADMIN — CHLORHEXIDINE GLUCONATE 15 MILLILITER(S): 213 SOLUTION TOPICAL at 17:16

## 2019-01-23 RX ADMIN — MIDAZOLAM HYDROCHLORIDE 4 MILLIGRAM(S): 1 INJECTION, SOLUTION INTRAMUSCULAR; INTRAVENOUS at 16:00

## 2019-01-23 RX ADMIN — Medication 10 MILLIGRAM(S): at 05:50

## 2019-01-23 RX ADMIN — BUDESONIDE AND FORMOTEROL FUMARATE DIHYDRATE 2 PUFF(S): 160; 4.5 AEROSOL RESPIRATORY (INHALATION) at 22:08

## 2019-01-23 RX ADMIN — CHLORHEXIDINE GLUCONATE 15 MILLILITER(S): 213 SOLUTION TOPICAL at 05:49

## 2019-01-23 RX ADMIN — Medication 1: at 14:10

## 2019-01-23 RX ADMIN — PANTOPRAZOLE SODIUM 40 MILLIGRAM(S): 20 TABLET, DELAYED RELEASE ORAL at 13:39

## 2019-01-23 RX ADMIN — SIMVASTATIN 20 MILLIGRAM(S): 20 TABLET, FILM COATED ORAL at 22:24

## 2019-01-23 RX ADMIN — ALBUTEROL 1 PUFF(S): 90 AEROSOL, METERED ORAL at 03:26

## 2019-01-23 RX ADMIN — CISATRACURIUM BESYLATE 20 MILLIGRAM(S): 2 INJECTION INTRAVENOUS at 16:07

## 2019-01-23 RX ADMIN — MIDAZOLAM HYDROCHLORIDE 4 MILLIGRAM(S): 1 INJECTION, SOLUTION INTRAMUSCULAR; INTRAVENOUS at 16:02

## 2019-01-23 RX ADMIN — Medication 250 MILLIGRAM(S): at 17:16

## 2019-01-23 RX ADMIN — ALBUTEROL 1 PUFF(S): 90 AEROSOL, METERED ORAL at 10:47

## 2019-01-23 RX ADMIN — Medication 137 MICROGRAM(S): at 05:50

## 2019-01-23 RX ADMIN — Medication 250 MILLIGRAM(S): at 05:51

## 2019-01-23 RX ADMIN — ALBUTEROL 1 PUFF(S): 90 AEROSOL, METERED ORAL at 17:21

## 2019-01-23 RX ADMIN — Medication 1000 MILLIGRAM(S): at 00:47

## 2019-01-23 RX ADMIN — AMLODIPINE BESYLATE 10 MILLIGRAM(S): 2.5 TABLET ORAL at 13:38

## 2019-01-23 RX ADMIN — HYDROMORPHONE HYDROCHLORIDE 30 MILLILITER(S): 2 INJECTION INTRAMUSCULAR; INTRAVENOUS; SUBCUTANEOUS at 07:43

## 2019-01-23 RX ADMIN — Medication 40 MILLIGRAM(S): at 05:50

## 2019-01-23 RX ADMIN — GABAPENTIN 400 MILLIGRAM(S): 400 CAPSULE ORAL at 15:00

## 2019-01-23 RX ADMIN — Medication 25 MILLIGRAM(S): at 06:06

## 2019-01-23 RX ADMIN — HYDROMORPHONE HYDROCHLORIDE 30 MILLILITER(S): 2 INJECTION INTRAMUSCULAR; INTRAVENOUS; SUBCUTANEOUS at 20:25

## 2019-01-23 RX ADMIN — Medication 0.5 MILLIGRAM(S): at 10:15

## 2019-01-23 RX ADMIN — Medication 10 UNIT(S): at 06:05

## 2019-01-23 RX ADMIN — Medication 40 MILLIGRAM(S): at 13:28

## 2019-01-23 RX ADMIN — ENOXAPARIN SODIUM 40 MILLIGRAM(S): 100 INJECTION SUBCUTANEOUS at 23:01

## 2019-01-23 RX ADMIN — ENOXAPARIN SODIUM 40 MILLIGRAM(S): 100 INJECTION SUBCUTANEOUS at 13:39

## 2019-01-23 RX ADMIN — ALBUTEROL 1 PUFF(S): 90 AEROSOL, METERED ORAL at 22:08

## 2019-01-23 RX ADMIN — GABAPENTIN 400 MILLIGRAM(S): 400 CAPSULE ORAL at 05:50

## 2019-01-23 RX ADMIN — MIDAZOLAM HYDROCHLORIDE 2 MILLIGRAM(S): 1 INJECTION, SOLUTION INTRAMUSCULAR; INTRAVENOUS at 04:39

## 2019-01-23 RX ADMIN — FENTANYL CITRATE 100 MICROGRAM(S): 50 INJECTION INTRAVENOUS at 16:04

## 2019-01-23 RX ADMIN — SODIUM CHLORIDE 3 MILLILITER(S): 9 INJECTION INTRAMUSCULAR; INTRAVENOUS; SUBCUTANEOUS at 10:47

## 2019-01-23 RX ADMIN — Medication 400 MILLIGRAM(S): at 00:20

## 2019-01-23 RX ADMIN — GABAPENTIN 400 MILLIGRAM(S): 400 CAPSULE ORAL at 22:28

## 2019-01-23 RX ADMIN — BUDESONIDE AND FORMOTEROL FUMARATE DIHYDRATE 2 PUFF(S): 160; 4.5 AEROSOL RESPIRATORY (INHALATION) at 10:47

## 2019-01-23 NOTE — PROGRESS NOTE ADULT - SUBJECTIVE AND OBJECTIVE BOX
Anesthesia Pain Management Service    SUBJECTIVE: Patient is doing well with IV PCA and no significant problems reported.    Pain Scale Score	At rest: _7__ 	With Activity: ___ 	[X ] Refer to charted pain scores    THERAPY:    [ ] IV PCA Morphine		[ ] 5 mg/mL	[ ] 1 mg/mL  [X ] IV PCA Hydromorphone	[ ] 5 mg/mL	[X ] 1 mg/mL  [ ] IV PCA Fentanyl		[ ] 50 micrograms/mL    Demand dose __0.35_ lockout __6_ (minutes) Continuous Rate _0.1__ Total: _14.15__  mg used (in past 24 hours)      MEDICATIONS  (STANDING):  ALBUTerol    90 MICROgram(s) HFA Inhaler 1 Puff(s) Inhalation every 6 hours  amLODIPine   Tablet 10 milliGRAM(s) Oral daily  buDESOnide 160 MICROgram(s)/formoterol 4.5 MICROgram(s) Inhaler 2 Puff(s) Inhalation two times a day  chlorhexidine 0.12% Liquid 15 milliLiter(s) Swish and Spit two times a day  chlorhexidine 4% Liquid 1 Application(s) Topical <User Schedule>  collagenase Ointment 1 Application(s) Topical daily  enoxaparin Injectable 40 milliGRAM(s) SubCutaneous daily  furosemide   Injectable 40 milliGRAM(s) IV Push daily  gabapentin   Solution 400 milliGRAM(s) Oral three times a day  HYDROmorphone PCA (1 mG/mL) 30 milliLiter(s) PCA Continuous PCA Continuous  influenza   Vaccine 0.5 milliLiter(s) IntraMuscular once  insulin glargine Injectable (LANTUS) 22 Unit(s) SubCutaneous at bedtime  insulin lispro (HumaLOG) corrective regimen sliding scale   SubCutaneous every 8 hours  insulin lispro Injectable (HumaLOG) 10 Unit(s) SubCutaneous every 8 hours  levothyroxine 137 MICROGram(s) Oral daily  metoprolol tartrate 25 milliGRAM(s) Enteral Tube two times a day  pantoprazole   Suspension 40 milliGRAM(s) Oral daily  predniSONE   Tablet 10 milliGRAM(s) Oral daily  simvastatin 20 milliGRAM(s) Oral at bedtime  sodium chloride 3%  Inhalation 3 milliLiter(s) Inhalation two times a day  vancomycin  IVPB 1000 milliGRAM(s) IV Intermittent two times a day    MEDICATIONS  (PRN):  HYDROmorphone PCA (1 mG/mL) Rescue Clinician Bolus 0.5 milliGRAM(s) IV Push every 15 minutes PRN for Pain Scale GREATER THAN 6  naloxone Injectable 0.1 milliGRAM(s) IV Push every 3 minutes PRN For ANY of the following changes in patient status:  A. RR LESS THAN 10 breaths per minute, B. Oxygen saturation LESS THAN 90%, C. Sedation score of 6  ondansetron Injectable 4 milliGRAM(s) IV Push every 6 hours PRN Nausea      OBJECTIVE:    Sedation Score:	[ X] Alert	[ ] Drowsy 	[ ] Arousable	[ ] Asleep	[ ] Unresponsive    Side Effects:	[X ] None	[ ] Nausea	[ ] Vomiting	[ ] Pruritus  		[ ] Other:    Vital Signs Last 24 Hrs  T(C): 36.9 (23 Jan 2019 04:00), Max: 37.5 (22 Jan 2019 12:00)  T(F): 98.5 (23 Jan 2019 04:00), Max: 99.5 (22 Jan 2019 12:00)  HR: 104 (23 Jan 2019 07:42) (88 - 117)  BP: 117/63 (23 Jan 2019 07:00) (95/59 - 152/88)  BP(mean): 74 (23 Jan 2019 07:00) (67 - 103)  RR: 17 (23 Jan 2019 07:00) (14 - 23)  SpO2: 91% (23 Jan 2019 07:42) (91% - 97%)    ASSESSMENT/ PLAN    Therapy to  be:	[ X] Continue   [ ] Discontinued   [ ] Change to prn Analgesics    Documentation and Verification of current medications:   [X] Done	[ ] Not done, not elligible    Comments:  planned for extubation today per pt/family  continue IV pca, gabapentin, ativan  check lfts and consider adding regular tylenol

## 2019-01-23 NOTE — PROGRESS NOTE ADULT - SUBJECTIVE AND OBJECTIVE BOX
INTERVAL HPI/OVERNIGHT EVENTS: Satting in low 90s on CPAP. Anxiety/agitation treated with versed overnight.     SUBJECTIVE: Patient seen and examined at bedside.     OBJECTIVE:    VITAL SIGNS:  ICU Vital Signs Last 24 Hrs  T(C): 37.3 (23 Jan 2019 08:00), Max: 37.5 (22 Jan 2019 12:00)  T(F): 99.1 (23 Jan 2019 08:00), Max: 99.5 (22 Jan 2019 12:00)  HR: 105 (23 Jan 2019 10:48) (88 - 118)  BP: 126/77 (23 Jan 2019 10:00) (95/59 - 152/88)  BP(mean): 87 (23 Jan 2019 10:00) (67 - 103)  ABP: --  ABP(mean): --  RR: 16 (23 Jan 2019 10:00) (14 - 29)  SpO2: 95% (23 Jan 2019 10:48) (90% - 97%)    Mode: CPAP with PS, FiO2: 60, PEEP: 5, PS: 15, MAP: 9.3, PIP: 21    01-22 @ 07:01 - 01-23 @ 07:00  --------------------------------------------------------  IN: 1790 mL / OUT: 2175 mL / NET: -385 mL    01-23 @ 07:01 - 01-23 @ 10:55  --------------------------------------------------------  IN: 70 mL / OUT: 300 mL / NET: -230 mL      CAPILLARY BLOOD GLUCOSE      POCT Blood Glucose.: 143 mg/dL (23 Jan 2019 06:00)      PHYSICAL EXAM:    General: NAD  HEENT: NC/AT; PERRL, clear conjunctiva  Neck: supple  Respiratory: CTA b/l  Cardiovascular: +S1/S2; RRR  Abdomen: soft, NT/ND; +BS x4  Extremities: WWP, 2+ peripheral pulses b/l; no LE edema  Skin: normal color and turgor; no rash  Neurological:    MEDICATIONS:  MEDICATIONS  (STANDING):  ALBUTerol    90 MICROgram(s) HFA Inhaler 1 Puff(s) Inhalation every 6 hours  amLODIPine   Tablet 10 milliGRAM(s) Oral daily  buDESOnide 160 MICROgram(s)/formoterol 4.5 MICROgram(s) Inhaler 2 Puff(s) Inhalation two times a day  chlorhexidine 0.12% Liquid 15 milliLiter(s) Swish and Spit two times a day  chlorhexidine 4% Liquid 1 Application(s) Topical <User Schedule>  collagenase Ointment 1 Application(s) Topical daily  enoxaparin Injectable 40 milliGRAM(s) SubCutaneous daily  furosemide   Injectable 40 milliGRAM(s) IV Push daily  gabapentin   Solution 400 milliGRAM(s) Oral three times a day  HYDROmorphone PCA (1 mG/mL) 30 milliLiter(s) PCA Continuous PCA Continuous  influenza   Vaccine 0.5 milliLiter(s) IntraMuscular once  insulin glargine Injectable (LANTUS) 22 Unit(s) SubCutaneous at bedtime  insulin lispro (HumaLOG) corrective regimen sliding scale   SubCutaneous every 8 hours  insulin lispro Injectable (HumaLOG) 10 Unit(s) SubCutaneous every 8 hours  levothyroxine 137 MICROGram(s) Oral daily  metoprolol tartrate 25 milliGRAM(s) Enteral Tube two times a day  pantoprazole   Suspension 40 milliGRAM(s) Oral daily  predniSONE   Tablet 10 milliGRAM(s) Oral daily  simvastatin 20 milliGRAM(s) Oral at bedtime  sodium chloride 3%  Inhalation 3 milliLiter(s) Inhalation two times a day  vancomycin  IVPB 1000 milliGRAM(s) IV Intermittent two times a day    MEDICATIONS  (PRN):  HYDROmorphone PCA (1 mG/mL) Rescue Clinician Bolus 0.5 milliGRAM(s) IV Push every 15 minutes PRN for Pain Scale GREATER THAN 6  naloxone Injectable 0.1 milliGRAM(s) IV Push every 3 minutes PRN For ANY of the following changes in patient status:  A. RR LESS THAN 10 breaths per minute, B. Oxygen saturation LESS THAN 90%, C. Sedation score of 6  ondansetron Injectable 4 milliGRAM(s) IV Push every 6 hours PRN Nausea      ALLERGIES:  Allergies    Allergy Status Unknown    Intolerances    Seroquel (Other)      LABS:                        8.7    8.98  )-----------( 450      ( 23 Jan 2019 05:40 )             30.3     Hemoglobin: 8.7 g/dL (01-23 @ 05:40)  Hemoglobin: 8.5 g/dL (01-22 @ 02:40)  Hemoglobin: 8.9 g/dL (01-21 @ 03:00)  Hemoglobin: 8.6 g/dL (01-20 @ 02:50)  Hemoglobin: 8.2 g/dL (01-19 @ 04:30)    CBC Full  -  ( 23 Jan 2019 05:40 )  WBC Count : 8.98 K/uL  Hemoglobin : 8.7 g/dL  Hematocrit : 30.3 %  Platelet Count - Automated : 450 K/uL  Mean Cell Volume : 103.8 fL  Mean Cell Hemoglobin : 29.8 pg  Mean Cell Hemoglobin Concentration : 28.7 %  Auto Neutrophil # : 5.05 K/uL  Auto Lymphocyte # : 2.22 K/uL  Auto Monocyte # : 1.11 K/uL  Auto Eosinophil # : 0.36 K/uL  Auto Basophil # : 0.07 K/uL  Auto Neutrophil % : 56.2 %  Auto Lymphocyte % : 24.7 %  Auto Monocyte % : 12.4 %  Auto Eosinophil % : 4.0 %  Auto Basophil % : 0.8 %    01-23    144  |  101  |  18  ----------------------------<  139<H>  3.9   |  31  |  0.71    Ca    8.9      23 Jan 2019 05:40  Phos  3.5     01-23  Mg     2.2     01-23      Creatinine Trend: 0.71<--, 0.65<--, 0.68<--, 0.74<--, 0.78<--, 0.82<--          ABG - ( 23 Jan 2019 05:40 )  pH, Arterial: 7.35  pH, Blood: x     /  pCO2: 64    /  pO2: 34    / HCO3: 31    / Base Excess: 8.9   /  SaO2: 54.1                05:40 - ABG - pH: 7.35  |  pCO2: 64    |  pO2: 34    | Lactate: 1.8   | BE: 8.9            EKG:   MICROBIOLOGY:    IMAGING:    RADIOLOGY & ADDITIONAL TESTS: Reviewed. INTERVAL HPI/OVERNIGHT EVENTS: Satting in low 90s on CPAP. Anxiety/agitation treated with versed overnight. Decreased inspiratory support on CPAP, holding tube feeds, will get ABG and extubate.     SUBJECTIVE: Patient seen and examined at bedside.     OBJECTIVE:    VITAL SIGNS:  ICU Vital Signs Last 24 Hrs  T(C): 37.3 (23 Jan 2019 08:00), Max: 37.5 (22 Jan 2019 12:00)  T(F): 99.1 (23 Jan 2019 08:00), Max: 99.5 (22 Jan 2019 12:00)  HR: 105 (23 Jan 2019 10:48) (88 - 118)  BP: 126/77 (23 Jan 2019 10:00) (95/59 - 152/88)  BP(mean): 87 (23 Jan 2019 10:00) (67 - 103)  ABP: --  ABP(mean): --  RR: 16 (23 Jan 2019 10:00) (14 - 29)  SpO2: 95% (23 Jan 2019 10:48) (90% - 97%)    Mode: CPAP with PS, FiO2: 60, PEEP: 5, PS: 15, MAP: 9.3, PIP: 21    01-22 @ 07:01 - 01-23 @ 07:00  --------------------------------------------------------  IN: 1790 mL / OUT: 2175 mL / NET: -385 mL    01-23 @ 07:01 - 01-23 @ 10:55  --------------------------------------------------------  IN: 70 mL / OUT: 300 mL / NET: -230 mL      CAPILLARY BLOOD GLUCOSE      POCT Blood Glucose.: 143 mg/dL (23 Jan 2019 06:00)      PHYSICAL EXAM:    General: NAD  HEENT: NC/AT; PERRL, clear conjunctiva  Neck: supple  Respiratory: CTA b/l  Cardiovascular: +S1/S2; RRR  Abdomen: soft, NT/ND; +BS x4  Extremities: WWP, 2+ peripheral pulses b/l; no LE edema  Skin: normal color and turgor; no rash  Neurological:    MEDICATIONS:  MEDICATIONS  (STANDING):  ALBUTerol    90 MICROgram(s) HFA Inhaler 1 Puff(s) Inhalation every 6 hours  amLODIPine   Tablet 10 milliGRAM(s) Oral daily  buDESOnide 160 MICROgram(s)/formoterol 4.5 MICROgram(s) Inhaler 2 Puff(s) Inhalation two times a day  chlorhexidine 0.12% Liquid 15 milliLiter(s) Swish and Spit two times a day  chlorhexidine 4% Liquid 1 Application(s) Topical <User Schedule>  collagenase Ointment 1 Application(s) Topical daily  enoxaparin Injectable 40 milliGRAM(s) SubCutaneous daily  furosemide   Injectable 40 milliGRAM(s) IV Push daily  gabapentin   Solution 400 milliGRAM(s) Oral three times a day  HYDROmorphone PCA (1 mG/mL) 30 milliLiter(s) PCA Continuous PCA Continuous  influenza   Vaccine 0.5 milliLiter(s) IntraMuscular once  insulin glargine Injectable (LANTUS) 22 Unit(s) SubCutaneous at bedtime  insulin lispro (HumaLOG) corrective regimen sliding scale   SubCutaneous every 8 hours  insulin lispro Injectable (HumaLOG) 10 Unit(s) SubCutaneous every 8 hours  levothyroxine 137 MICROGram(s) Oral daily  metoprolol tartrate 25 milliGRAM(s) Enteral Tube two times a day  pantoprazole   Suspension 40 milliGRAM(s) Oral daily  predniSONE   Tablet 10 milliGRAM(s) Oral daily  simvastatin 20 milliGRAM(s) Oral at bedtime  sodium chloride 3%  Inhalation 3 milliLiter(s) Inhalation two times a day  vancomycin  IVPB 1000 milliGRAM(s) IV Intermittent two times a day    MEDICATIONS  (PRN):  HYDROmorphone PCA (1 mG/mL) Rescue Clinician Bolus 0.5 milliGRAM(s) IV Push every 15 minutes PRN for Pain Scale GREATER THAN 6  naloxone Injectable 0.1 milliGRAM(s) IV Push every 3 minutes PRN For ANY of the following changes in patient status:  A. RR LESS THAN 10 breaths per minute, B. Oxygen saturation LESS THAN 90%, C. Sedation score of 6  ondansetron Injectable 4 milliGRAM(s) IV Push every 6 hours PRN Nausea      ALLERGIES:  Allergies    Allergy Status Unknown    Intolerances    Seroquel (Other)      LABS:                        8.7    8.98  )-----------( 450      ( 23 Jan 2019 05:40 )             30.3     Hemoglobin: 8.7 g/dL (01-23 @ 05:40)  Hemoglobin: 8.5 g/dL (01-22 @ 02:40)  Hemoglobin: 8.9 g/dL (01-21 @ 03:00)  Hemoglobin: 8.6 g/dL (01-20 @ 02:50)  Hemoglobin: 8.2 g/dL (01-19 @ 04:30)    CBC Full  -  ( 23 Jan 2019 05:40 )  WBC Count : 8.98 K/uL  Hemoglobin : 8.7 g/dL  Hematocrit : 30.3 %  Platelet Count - Automated : 450 K/uL  Mean Cell Volume : 103.8 fL  Mean Cell Hemoglobin : 29.8 pg  Mean Cell Hemoglobin Concentration : 28.7 %  Auto Neutrophil # : 5.05 K/uL  Auto Lymphocyte # : 2.22 K/uL  Auto Monocyte # : 1.11 K/uL  Auto Eosinophil # : 0.36 K/uL  Auto Basophil # : 0.07 K/uL  Auto Neutrophil % : 56.2 %  Auto Lymphocyte % : 24.7 %  Auto Monocyte % : 12.4 %  Auto Eosinophil % : 4.0 %  Auto Basophil % : 0.8 %    01-23    144  |  101  |  18  ----------------------------<  139<H>  3.9   |  31  |  0.71    Ca    8.9      23 Jan 2019 05:40  Phos  3.5     01-23  Mg     2.2     01-23      Creatinine Trend: 0.71<--, 0.65<--, 0.68<--, 0.74<--, 0.78<--, 0.82<--          ABG - ( 23 Jan 2019 05:40 )  pH, Arterial: 7.35  pH, Blood: x     /  pCO2: 64    /  pO2: 34    / HCO3: 31    / Base Excess: 8.9   /  SaO2: 54.1                05:40 - ABG - pH: 7.35  |  pCO2: 64    |  pO2: 34    | Lactate: 1.8   | BE: 8.9            EKG:   MICROBIOLOGY:    IMAGING:    RADIOLOGY & ADDITIONAL TESTS: Reviewed.

## 2019-01-23 NOTE — CHART NOTE - NSCHARTNOTEFT_GEN_A_CORE
: Johny Gonzales    INDICATION: Hypercarbic resp failure     PROCEDURE:  [x ] LIMITED ECHO  [ x] LIMITED CHEST  [ ] LIMITED RETROPERITONEAL  [ ] LIMITED ABDOMINAL  [ ] LIMITED DVT  [ ] NEEDLE GUIDANCE VASCULAR  [ ] NEEDLE GUIDANCE THORACENTESIS  [ ] NEEDLE GUIDANCE PARACENTESIS  [ ] NEEDLE GUIDANCE PERICARDIOCENTESIS  [ ] OTHER    FINDINGS:  Scattered B lines at the apices bilaterally   Atelectasis of left lung base, small pleural effusion noted at the right lung base  RV normal in size, LV systolic function intact    INTERPRETATION:  Hypercarbic respiratory failure 2/2 to COPD exacerbation   LV function intact

## 2019-01-23 NOTE — PROGRESS NOTE ADULT - ASSESSMENT
63 year old female with RA immunosuppressed from chronic steroids, underlying DM, presented on 12/15 with SBO.  S/p laparotomy and subsequent revision, now with respiratory failure and sepsis due to an enterococcal bacteremia.  Course/Infection complicated by underlying DM/ immuno suppression. High risk for complications.    Critically ill with Respiratory failure    1) Enterococcal bacteremia: Enterococcus in the blood is most often form a , abdominal, wound or line infection.   the patient has risk for all of the above.    Recent C ta/p without abscess but concern for midline wound infection  -Wound culture with mixed growth (including enterococcus but different species)        -Repeat blood culture without growth  -Consider MATEO   -Continue vancomycin (2-4 week course after blood cultures cleared)  therapeutic drug monitoring of vanco through before 4th dose    2) Abdominal Wound: retention sutures in place  Some drainage from lower pole  No abscess on CT    Wound Care      3) Immunosuppressed: due ot steroid use  Impairs wound healing    4) DM: glycemic control

## 2019-01-23 NOTE — PROGRESS NOTE ADULT - SUBJECTIVE AND OBJECTIVE BOX
Follow Up:      Inverval History/ROS:Patient is a 63y old  Female who presents with a chief complaint of abd pain (22 Jan 2019 10:34)  Intubated.  No fever.  Tx to MICU    Allergies    Allergy Status Unknown    Intolerances    Seroquel (Other)      ANTIMICROBIALS:  vancomycin  IVPB 1000 two times a day      OTHER MEDS:  ALBUTerol    90 MICROgram(s) HFA Inhaler 1 Puff(s) Inhalation every 6 hours  amLODIPine   Tablet 10 milliGRAM(s) Oral daily  buDESOnide 160 MICROgram(s)/formoterol 4.5 MICROgram(s) Inhaler 2 Puff(s) Inhalation two times a day  chlorhexidine 0.12% Liquid 15 milliLiter(s) Swish and Spit two times a day  chlorhexidine 4% Liquid 1 Application(s) Topical <User Schedule>  collagenase Ointment 1 Application(s) Topical daily  enoxaparin Injectable 40 milliGRAM(s) SubCutaneous daily  furosemide   Injectable 40 milliGRAM(s) IV Push daily  gabapentin   Solution 400 milliGRAM(s) Oral three times a day  HYDROmorphone PCA (1 mG/mL) 30 milliLiter(s) PCA Continuous PCA Continuous  HYDROmorphone PCA (1 mG/mL) Rescue Clinician Bolus 0.5 milliGRAM(s) IV Push every 15 minutes PRN  influenza   Vaccine 0.5 milliLiter(s) IntraMuscular once  insulin glargine Injectable (LANTUS) 22 Unit(s) SubCutaneous at bedtime  insulin lispro (HumaLOG) corrective regimen sliding scale   SubCutaneous every 8 hours  insulin lispro Injectable (HumaLOG) 10 Unit(s) SubCutaneous every 8 hours  levothyroxine 137 MICROGram(s) Oral daily  metoprolol tartrate 25 milliGRAM(s) Enteral Tube two times a day  naloxone Injectable 0.1 milliGRAM(s) IV Push every 3 minutes PRN  ondansetron Injectable 4 milliGRAM(s) IV Push every 6 hours PRN  pantoprazole   Suspension 40 milliGRAM(s) Oral daily  predniSONE   Tablet 10 milliGRAM(s) Oral daily  simvastatin 20 milliGRAM(s) Oral at bedtime  sodium chloride 3%  Inhalation 3 milliLiter(s) Inhalation two times a day      Vital Signs Last 24 Hrs  T(C): 37.3 (23 Jan 2019 08:00), Max: 37.5 (22 Jan 2019 12:00)  T(F): 99.1 (23 Jan 2019 08:00), Max: 99.5 (22 Jan 2019 12:00)  HR: 104 (23 Jan 2019 10:00) (88 - 118)  BP: 126/77 (23 Jan 2019 10:00) (95/59 - 152/88)  BP(mean): 87 (23 Jan 2019 10:00) (67 - 103)  RR: 16 (23 Jan 2019 10:00) (14 - 29)  SpO2: 90% (23 Jan 2019 10:00) (90% - 97%)    PHYSICAL EXAM:  General: [x ] non-toxic  HEAD/EYES: [ ] PERRL [x ] white sclera [ ] icterus  ENT:  [ ] normal [ x] supple [ ] thrush [ ] pharyngeal exudate  Cardiovascular:   [ ] murmur [ ] normal [ ] PPM/AICD  Respiratory:  [ x] clear to ausculation bilaterally  GI:  [x ] soft, non-tender, normal bowel sounds  :  [ ] iraheta [ ] no CVA tenderness   Musculoskeletal:  [ x] no synovitis  Neurologic:  [x ] non-focal exam   Skin:  [ x] no rash  Lymph: [ ] no lymphadenopathy  Psychiatric:  [x ] appropriate affect [ ] alert & oriented  Lines:  [ x] no phlebitis [ ] central line                                8.7    8.98  )-----------( 450      ( 23 Jan 2019 05:40 )             30.3       01-23    144  |  101  |  18  ----------------------------<  139<H>  3.9   |  31  |  0.71    Ca    8.9      23 Jan 2019 05:40  Phos  3.5     01-23  Mg     2.2     01-23            MICROBIOLOGY:    RADIOLOGY:

## 2019-01-23 NOTE — PROGRESS NOTE ADULT - ASSESSMENT
Assessment and Plan:    63y Female hx of COPD presented initially with SBO, underwent ex lap 12/15 closed with 4 retention sutures, evicerated on 12/30 and closed with 6 more retention sutures, c/b multifocal pneumonia, developed hypercapnic respiratory failure requiring intubation 1/6, s/p bronch 1/9/18, improving and tolerating CPAP, still intubated, course further c/b Enterococci bacteremia. Transferred to MICU at request of patient/family.     Neuro:  - continue ativan 0.5 mg PO PRN for discomfort/ anxiety  - ct PCA dilaudid, pt with chronic opioid requirement  - ct gabapentin to 400mg tid    Resp:  - Intubated, CPAPing with sats in the low 90s  - C/w albuterol and Symbicort   - pulm following, will f/u recs   - Planning for extubation trail today    CV:   - Continue amlodipine 10mg  - Metoprolol 25mg po BID   - Lasix 40 mg IV od, diurese to keep net negative 1 liter everyday    GI:  - s/p 2*ex-lap, 10 retention sutures in situ, gaping wound covered in fibrinous exudate   - santyl to wound daily  - c/w protonix daily, home med  - c/w bolus tube feeds    Renal:  - BMP and I&O monitoring as routine, stable BMP   - iraheta removed, patient spontaneously voiding   - Repletion of electrolytes PRN    Heme:  - H/H stable   - VTE ppx w/ Lovenox and IPCs    ID:  - blood cultures 1/12 growing Enterococcus faecium, repeat blood culture 1/17 NGTD  - lines: iraheta removed, rt IJV (1/6) changed to lt IJV 1/13,  ET 1/6  - Source of bacteremia not identified may be 2/2 abdominal wound (no abscess on CT) or other. ID following and recommend considering MATEO and continuance of Vancomycin, will plan to resume abx  - c/w vancomycin 1g Q12hrs -- ID recs 2-4 week course    Endo:  - hx RA on chronic prednisone, DM, hypothyroid  - Hydrocortisone changed to home Prednisone dose 10mg Po ngt  - Synthroid 137mcg od through ngt on empty stomach    - Lantus 26 units q hs and Humalog 10units TID AC (prior to bolus feeds) along with low dose corrective scale to cover, monitor sugar levels and adjust insulin accordingly     Lines:  -left IJ central line  -amaury feeding tube  -ET Assessment and Plan:    63y Female hx of COPD presented initially with SBO, underwent ex lap 12/15 closed with 4 retention sutures, evicerated on 12/30 and closed with 6 more retention sutures, c/b multifocal pneumonia, developed hypercapnic respiratory failure requiring intubation 1/6, s/p bronch 1/9/18, improving and tolerating CPAP, still intubated, course further c/b Enterococci bacteremia. Transferred to MICU at request of patient/family.     Neuro:  - continue ativan 0.5 mg PO PRN for discomfort/ anxiety  - ct PCA dilaudid, pt with chronic opioid requirement  - ct gabapentin to 400mg tid    Resp:  - Intubated, CPAPing with sats in the low 90s  - C/w albuterol and Symbicort   - pulm following, will f/u recs   - Planning for extubation trail today    CV:   - Continue amlodipine 10mg  - Metoprolol 25mg po BID   - Lasix 40 mg IV QD, will give another dose of lasix prior to extubation today    GI:  - s/p 2*ex-lap, 10 retention sutures in situ, gaping wound covered in fibrinous exudate   - santyl to wound daily  - c/w protonix daily, home med  - holding bolus tube feeds leading up to extubation     Renal:  - BMP and I&O monitoring as routine, stable BMP   - iraheta removed, patient spontaneously voiding   - Repletion of electrolytes PRN    Heme:  - H/H stable   - VTE ppx w/ Lovenox and IPCs--increasing lovenox to BID in line with weight based dosing     ID:  - blood cultures 1/12 growing Enterococcus faecium, repeat blood culture 1/17 NGTD  - lines: iraheta removed, rt IJV (1/6) changed to lt IJV 1/13,  ET 1/6  - Source of bacteremia not identified may be 2/2 abdominal wound (no abscess on CT) or other. ID following and recommend considering MATEO and continuance of Vancomycin, will plan to resume abx  - c/w vancomycin 1g Q12hrs -- ID recs 2-4 week course    Endo:  - hx RA on chronic prednisone, DM, hypothyroid  - Hydrocortisone changed to home Prednisone dose 10mg Po ngt  - Synthroid 137mcg od through ngt on empty stomach    - Lantus 26 units q hs and Humalog 10units TID AC (prior to bolus feeds) along with low dose corrective scale to cover, monitor sugar levels and adjust insulin accordingly     Lines:  -left IJ central line  -amaury feeding tube  -ET

## 2019-01-23 NOTE — PROGRESS NOTE ADULT - ASSESSMENT
63F w/ multiple respiratory comorbidities p/w SBO s/p ex-lap with findings of healthy bowel c/b evisceration of bowel requiring placement of retention sutures, difficult to wean off ventilation    Plan  - Per family and patient's wishes, planning for transfer to [facility] is in progress  - awaiting insurance approval for transfer, patient to be monitored in MICU in the interim

## 2019-01-23 NOTE — PROGRESS NOTE ADULT - SUBJECTIVE AND OBJECTIVE BOX
B-Team Surgery Progress Note     Subjective/24hour Events: no acute event overnight, continue to have thick secretion     Vital Signs:  Vital Signs Last 24 Hrs  T(C): 36.9 (23 Jan 2019 04:00), Max: 37.5 (22 Jan 2019 12:00)  T(F): 98.5 (23 Jan 2019 04:00), Max: 99.5 (22 Jan 2019 12:00)  HR: 104 (23 Jan 2019 07:42) (88 - 117)  BP: 117/63 (23 Jan 2019 07:00) (95/59 - 152/88)  BP(mean): 74 (23 Jan 2019 07:00) (67 - 103)  RR: 17 (23 Jan 2019 07:00) (14 - 23)  SpO2: 91% (23 Jan 2019 07:42) (91% - 97%)    Physical Exam:  Gen: NAD, awake and interactive   LS: on , 5, 60%, PSV15, with good gas exchange   Card: pulse regularly and vital sign stable per monitoring   GI: soft, non-distended, non-tender, retention sutures in place with fibrinous exudate     CBC (01-23 @ 05:40)                          8.7<L>                   8.98    )--------------(  450<H>     56.2  % Neuts, 24.7  % Lymphs, ANC: 5.05                            30.3<L>  CBC (01-22 @ 02:40)                          8.5<L>                   9.68    )--------------(  397        --    % Neuts, --    % Lymphs, ANC: --                              28.7<L>    BMP (01-23 @ 05:40)       144     |  101     |  18    			Ca++ --      Ca 8.9          ---------------------------------( 139<H>		Mg 2.2          3.9     |  31      |  0.71  			Ph 3.5     BMP (01-22 @ 02:40)       145     |  101     |  17    			Ca++ --      Ca 9.2          ---------------------------------( 153<H>		Mg 2.0          3.8     |  34<H>   |  0.65  			Ph 2.1<L>      ABG (01-23 @ 05:40)     7.35 / 64<H> / 34<LL> / 31<H> / 8.9 / 54.1<L>%     Lactate: 1.8     VBG (01-22 @ 02:40)     7.42 / 56<H> / 36 / 34<H> / 10.9 / 66.2%      Lactate: 2.0    -> BLOOD VENOUS Culture (01-17 @ 10:09)     NG    NG  NG

## 2019-01-23 NOTE — PROGRESS NOTE ADULT - SUBJECTIVE AND OBJECTIVE BOX
Anesthesia Pain Management Service    SUBJECTIVE: Pt doing well with IV PCA without problems reported.    Therapy:	  [ X] IV PCA	   [ ] Epidural           [ ] s/p Spinal Opoid              [ ] Postpartum infusion	  [ ] Patient controlled regional anesthesia (PCRA)    [ ] prn Analgesics    Allergies    Allergy Status Unknown    Intolerances    Seroquel (Other)    MEDICATIONS  (STANDING):  ALBUTerol    90 MICROgram(s) HFA Inhaler 1 Puff(s) Inhalation every 6 hours  amLODIPine   Tablet 10 milliGRAM(s) Oral daily  buDESOnide 160 MICROgram(s)/formoterol 4.5 MICROgram(s) Inhaler 2 Puff(s) Inhalation two times a day  chlorhexidine 0.12% Liquid 15 milliLiter(s) Swish and Spit two times a day  chlorhexidine 4% Liquid 1 Application(s) Topical <User Schedule>  collagenase Ointment 1 Application(s) Topical daily  enoxaparin Injectable 40 milliGRAM(s) SubCutaneous two times a day  furosemide   Injectable 40 milliGRAM(s) IV Push daily  furosemide   Injectable 40 milliGRAM(s) IV Push once  gabapentin   Solution 400 milliGRAM(s) Oral three times a day  HYDROmorphone PCA (1 mG/mL) 30 milliLiter(s) PCA Continuous PCA Continuous  influenza   Vaccine 0.5 milliLiter(s) IntraMuscular once  insulin glargine Injectable (LANTUS) 22 Unit(s) SubCutaneous at bedtime  insulin lispro (HumaLOG) corrective regimen sliding scale   SubCutaneous every 8 hours  insulin lispro Injectable (HumaLOG) 10 Unit(s) SubCutaneous every 8 hours  levothyroxine 137 MICROGram(s) Oral daily  metoprolol tartrate 25 milliGRAM(s) Enteral Tube two times a day  pantoprazole   Suspension 40 milliGRAM(s) Oral daily  predniSONE   Tablet 10 milliGRAM(s) Oral daily  simvastatin 20 milliGRAM(s) Oral at bedtime  sodium chloride 3%  Inhalation 3 milliLiter(s) Inhalation two times a day  vancomycin  IVPB 1000 milliGRAM(s) IV Intermittent two times a day    MEDICATIONS  (PRN):  HYDROmorphone PCA (1 mG/mL) Rescue Clinician Bolus 0.5 milliGRAM(s) IV Push every 15 minutes PRN for Pain Scale GREATER THAN 6  naloxone Injectable 0.1 milliGRAM(s) IV Push every 3 minutes PRN For ANY of the following changes in patient status:  A. RR LESS THAN 10 breaths per minute, B. Oxygen saturation LESS THAN 90%, C. Sedation score of 6  ondansetron Injectable 4 milliGRAM(s) IV Push every 6 hours PRN Nausea      OBJECTIVE:   [X] No new signs     [ ] Other:    Side Effects:  [X ] None			[ ] Other:    Assessment of Catheter Site:		[ ] Intact		[ ] Other:    ASSESSMENT/PLAN  [ X] Continue current therapy    [ ] Therapy changed to:    [ ] IV PCA       [ ] Epidural     [ ] prn Analgesics     Comments:

## 2019-01-23 NOTE — CHART NOTE - NSCHARTNOTEFT_GEN_A_CORE
Procedure: Transesophageal Echocardiogram  Indication:  Endocarditis evaluation  Consent: Obtained  Operators: Bee  Anesthesia:  Fentanyl, versed and Nimbex Added to general ICU sedation  Preparation: Fio2 increased to 100, emergent airway in the room.  Technique: Inserted with out video assistance,   Findings:    AV:  Normal trileaflet valve, with out notable vegetation, AR or AS    MV:  Normal mitral valve, though poorly visualized posterior valve in the traditional views. No vegetation, no MR.      PV:  normal PV with no NV no vegetations    TV: Minimal TR, with poor angle to measure RVSP.  No vegetation noted.      LV: Normal LV function almost appearing hyperdynamic.       RV:  RV appears enlarged, but with normal RV function    LA:  Normal LA without clot in the AUGUST    RA: normal apearing RA     SVC: there was a catheter seen in the SVC as well as >30% variation with inspiration      Aorta:  Normal appearing aorta with out significant plaque, aneurysms, or dissections visualized.     PA: No clot seen in the PA    Doppler:  Note:  Vti in the deep gastric view was 15.4  Mitral inflow pattern with A>E e wave 54, A 74, e' 7.5 Suggestive of impaired relaxation with out Increased pressures.        Other:     Assessment and Plan:  No explanation for persistent bacteremia.  Pt is not in shock, though SVC measurements and hyperdynamic assessment suggest she is volume responsive currently there is no indication for fluid.

## 2019-01-24 LAB
ALBUMIN SERPL ELPH-MCNC: 3.2 G/DL — LOW (ref 3.3–5)
ALP SERPL-CCNC: 111 U/L — SIGNIFICANT CHANGE UP (ref 40–120)
ALT FLD-CCNC: 20 U/L — SIGNIFICANT CHANGE UP (ref 4–33)
ANION GAP SERPL CALC-SCNC: 14 MMO/L — SIGNIFICANT CHANGE UP (ref 7–14)
AST SERPL-CCNC: 21 U/L — SIGNIFICANT CHANGE UP (ref 4–32)
BASOPHILS # BLD AUTO: 0.08 K/UL — SIGNIFICANT CHANGE UP (ref 0–0.2)
BASOPHILS NFR BLD AUTO: 0.8 % — SIGNIFICANT CHANGE UP (ref 0–2)
BILIRUB SERPL-MCNC: 0.2 MG/DL — SIGNIFICANT CHANGE UP (ref 0.2–1.2)
BUN SERPL-MCNC: 21 MG/DL — SIGNIFICANT CHANGE UP (ref 7–23)
CALCIUM SERPL-MCNC: 8.9 MG/DL — SIGNIFICANT CHANGE UP (ref 8.4–10.5)
CHLORIDE SERPL-SCNC: 100 MMOL/L — SIGNIFICANT CHANGE UP (ref 98–107)
CO2 SERPL-SCNC: 30 MMOL/L — SIGNIFICANT CHANGE UP (ref 22–31)
CREAT SERPL-MCNC: 0.82 MG/DL — SIGNIFICANT CHANGE UP (ref 0.5–1.3)
EOSINOPHIL # BLD AUTO: 0.37 K/UL — SIGNIFICANT CHANGE UP (ref 0–0.5)
EOSINOPHIL NFR BLD AUTO: 3.7 % — SIGNIFICANT CHANGE UP (ref 0–6)
FOLATE SERPL-MCNC: 12.8 NG/ML — SIGNIFICANT CHANGE UP (ref 4.7–20)
GLUCOSE BLDC GLUCOMTR-MCNC: 105 MG/DL — HIGH (ref 70–99)
GLUCOSE BLDC GLUCOMTR-MCNC: 164 MG/DL — HIGH (ref 70–99)
GLUCOSE BLDC GLUCOMTR-MCNC: 170 MG/DL — HIGH (ref 70–99)
GLUCOSE BLDC GLUCOMTR-MCNC: 202 MG/DL — HIGH (ref 70–99)
GLUCOSE SERPL-MCNC: 163 MG/DL — HIGH (ref 70–99)
HCT VFR BLD CALC: 30.2 % — LOW (ref 34.5–45)
HGB BLD-MCNC: 8.9 G/DL — LOW (ref 11.5–15.5)
IMM GRANULOCYTES NFR BLD AUTO: 2 % — HIGH (ref 0–1.5)
LYMPHOCYTES # BLD AUTO: 2.65 K/UL — SIGNIFICANT CHANGE UP (ref 1–3.3)
LYMPHOCYTES # BLD AUTO: 26.3 % — SIGNIFICANT CHANGE UP (ref 13–44)
MAGNESIUM SERPL-MCNC: 2.1 MG/DL — SIGNIFICANT CHANGE UP (ref 1.6–2.6)
MCHC RBC-ENTMCNC: 29.5 % — LOW (ref 32–36)
MCHC RBC-ENTMCNC: 30.1 PG — SIGNIFICANT CHANGE UP (ref 27–34)
MCV RBC AUTO: 102 FL — HIGH (ref 80–100)
MONOCYTES # BLD AUTO: 1.33 K/UL — HIGH (ref 0–0.9)
MONOCYTES NFR BLD AUTO: 13.2 % — SIGNIFICANT CHANGE UP (ref 2–14)
NEUTROPHILS # BLD AUTO: 5.46 K/UL — SIGNIFICANT CHANGE UP (ref 1.8–7.4)
NEUTROPHILS NFR BLD AUTO: 54 % — SIGNIFICANT CHANGE UP (ref 43–77)
NRBC # FLD: 0.3 K/UL — LOW (ref 25–125)
NRBC FLD-RTO: 3 — SIGNIFICANT CHANGE UP
PHOSPHATE SERPL-MCNC: 3.8 MG/DL — SIGNIFICANT CHANGE UP (ref 2.5–4.5)
PLATELET # BLD AUTO: 428 K/UL — HIGH (ref 150–400)
PMV BLD: 9.7 FL — SIGNIFICANT CHANGE UP (ref 7–13)
POTASSIUM SERPL-MCNC: 3.6 MMOL/L — SIGNIFICANT CHANGE UP (ref 3.5–5.3)
POTASSIUM SERPL-SCNC: 3.6 MMOL/L — SIGNIFICANT CHANGE UP (ref 3.5–5.3)
PROT SERPL-MCNC: 6.5 G/DL — SIGNIFICANT CHANGE UP (ref 6–8.3)
RBC # BLD: 2.96 M/UL — LOW (ref 3.8–5.2)
RBC # FLD: 18 % — HIGH (ref 10.3–14.5)
SODIUM SERPL-SCNC: 144 MMOL/L — SIGNIFICANT CHANGE UP (ref 135–145)
VANCOMYCIN FLD-MCNC: 14.1 UG/ML — SIGNIFICANT CHANGE UP
VANCOMYCIN TROUGH SERPL-MCNC: 24.6 UG/ML — HIGH (ref 10–20)
VIT B12 SERPL-MCNC: 683 PG/ML — SIGNIFICANT CHANGE UP (ref 200–900)
WBC # BLD: 10.09 K/UL — SIGNIFICANT CHANGE UP (ref 3.8–10.5)
WBC # FLD AUTO: 10.09 K/UL — SIGNIFICANT CHANGE UP (ref 3.8–10.5)

## 2019-01-24 PROCEDURE — 99291 CRITICAL CARE FIRST HOUR: CPT

## 2019-01-24 PROCEDURE — 99233 SBSQ HOSP IP/OBS HIGH 50: CPT

## 2019-01-24 RX ORDER — ACETAMINOPHEN 500 MG
1000 TABLET ORAL ONCE
Qty: 0 | Refills: 0 | Status: DISCONTINUED | OUTPATIENT
Start: 2019-01-24 | End: 2019-01-25

## 2019-01-24 RX ORDER — INSULIN LISPRO 100/ML
5 VIAL (ML) SUBCUTANEOUS ONCE
Qty: 0 | Refills: 0 | Status: COMPLETED | OUTPATIENT
Start: 2019-01-24 | End: 2019-01-24

## 2019-01-24 RX ORDER — HYDROMORPHONE HYDROCHLORIDE 2 MG/ML
30 INJECTION INTRAMUSCULAR; INTRAVENOUS; SUBCUTANEOUS
Qty: 0 | Refills: 0 | Status: DISCONTINUED | OUTPATIENT
Start: 2019-01-24 | End: 2019-01-25

## 2019-01-24 RX ORDER — VANCOMYCIN HCL 1 G
1000 VIAL (EA) INTRAVENOUS EVERY 12 HOURS
Qty: 0 | Refills: 0 | Status: DISCONTINUED | OUTPATIENT
Start: 2019-01-24 | End: 2019-01-24

## 2019-01-24 RX ORDER — VANCOMYCIN HCL 1 G
750 VIAL (EA) INTRAVENOUS EVERY 12 HOURS
Qty: 0 | Refills: 0 | Status: DISCONTINUED | OUTPATIENT
Start: 2019-01-24 | End: 2019-01-24

## 2019-01-24 RX ORDER — HYDROMORPHONE HYDROCHLORIDE 2 MG/ML
1 INJECTION INTRAMUSCULAR; INTRAVENOUS; SUBCUTANEOUS ONCE
Qty: 0 | Refills: 0 | Status: DISCONTINUED | OUTPATIENT
Start: 2019-01-24 | End: 2019-01-24

## 2019-01-24 RX ORDER — ALBUTEROL 90 UG/1
2.5 AEROSOL, METERED ORAL EVERY 6 HOURS
Qty: 0 | Refills: 0 | Status: DISCONTINUED | OUTPATIENT
Start: 2019-01-24 | End: 2019-01-31

## 2019-01-24 RX ORDER — VANCOMYCIN HCL 1 G
750 VIAL (EA) INTRAVENOUS EVERY 12 HOURS
Qty: 0 | Refills: 0 | Status: DISCONTINUED | OUTPATIENT
Start: 2019-01-24 | End: 2019-01-28

## 2019-01-24 RX ORDER — HYDROMORPHONE HYDROCHLORIDE 2 MG/ML
0.5 INJECTION INTRAMUSCULAR; INTRAVENOUS; SUBCUTANEOUS
Qty: 0 | Refills: 0 | Status: DISCONTINUED | OUTPATIENT
Start: 2019-01-24 | End: 2019-01-25

## 2019-01-24 RX ADMIN — Medication 10 MILLIGRAM(S): at 06:23

## 2019-01-24 RX ADMIN — HYDROMORPHONE HYDROCHLORIDE 1 MILLIGRAM(S): 2 INJECTION INTRAMUSCULAR; INTRAVENOUS; SUBCUTANEOUS at 06:15

## 2019-01-24 RX ADMIN — HYDROMORPHONE HYDROCHLORIDE 1 MILLIGRAM(S): 2 INJECTION INTRAMUSCULAR; INTRAVENOUS; SUBCUTANEOUS at 05:45

## 2019-01-24 RX ADMIN — BUDESONIDE AND FORMOTEROL FUMARATE DIHYDRATE 2 PUFF(S): 160; 4.5 AEROSOL RESPIRATORY (INHALATION) at 08:44

## 2019-01-24 RX ADMIN — ALBUTEROL 1 PUFF(S): 90 AEROSOL, METERED ORAL at 04:22

## 2019-01-24 RX ADMIN — SODIUM CHLORIDE 3 MILLILITER(S): 9 INJECTION INTRAMUSCULAR; INTRAVENOUS; SUBCUTANEOUS at 22:55

## 2019-01-24 RX ADMIN — GABAPENTIN 400 MILLIGRAM(S): 400 CAPSULE ORAL at 06:23

## 2019-01-24 RX ADMIN — Medication 137 MICROGRAM(S): at 06:26

## 2019-01-24 RX ADMIN — CHLORHEXIDINE GLUCONATE 15 MILLILITER(S): 213 SOLUTION TOPICAL at 06:22

## 2019-01-24 RX ADMIN — HYDROMORPHONE HYDROCHLORIDE 30 MILLILITER(S): 2 INJECTION INTRAMUSCULAR; INTRAVENOUS; SUBCUTANEOUS at 19:52

## 2019-01-24 RX ADMIN — Medication 25 MILLIGRAM(S): at 06:38

## 2019-01-24 RX ADMIN — Medication 5 UNIT(S): at 06:30

## 2019-01-24 RX ADMIN — ALBUTEROL 1 PUFF(S): 90 AEROSOL, METERED ORAL at 16:18

## 2019-01-24 RX ADMIN — Medication 40 MILLIGRAM(S): at 06:23

## 2019-01-24 RX ADMIN — AMLODIPINE BESYLATE 10 MILLIGRAM(S): 2.5 TABLET ORAL at 06:23

## 2019-01-24 RX ADMIN — BUDESONIDE AND FORMOTEROL FUMARATE DIHYDRATE 2 PUFF(S): 160; 4.5 AEROSOL RESPIRATORY (INHALATION) at 23:00

## 2019-01-24 RX ADMIN — INSULIN GLARGINE 22 UNIT(S): 100 INJECTION, SOLUTION SUBCUTANEOUS at 22:03

## 2019-01-24 RX ADMIN — ALBUTEROL 2.5 MILLIGRAM(S): 90 AEROSOL, METERED ORAL at 22:30

## 2019-01-24 RX ADMIN — ALBUTEROL 1 PUFF(S): 90 AEROSOL, METERED ORAL at 08:44

## 2019-01-24 RX ADMIN — ENOXAPARIN SODIUM 40 MILLIGRAM(S): 100 INJECTION SUBCUTANEOUS at 12:49

## 2019-01-24 RX ADMIN — Medication 1 APPLICATION(S): at 20:41

## 2019-01-24 RX ADMIN — Medication 25 MILLIGRAM(S): at 19:43

## 2019-01-24 RX ADMIN — Medication 250 MILLIGRAM(S): at 22:02

## 2019-01-24 RX ADMIN — CHLORHEXIDINE GLUCONATE 1 APPLICATION(S): 213 SOLUTION TOPICAL at 12:44

## 2019-01-24 RX ADMIN — Medication 2: at 15:00

## 2019-01-24 RX ADMIN — SODIUM CHLORIDE 3 MILLILITER(S): 9 INJECTION INTRAMUSCULAR; INTRAVENOUS; SUBCUTANEOUS at 08:44

## 2019-01-24 RX ADMIN — GABAPENTIN 400 MILLIGRAM(S): 400 CAPSULE ORAL at 22:02

## 2019-01-24 RX ADMIN — CHLORHEXIDINE GLUCONATE 15 MILLILITER(S): 213 SOLUTION TOPICAL at 18:12

## 2019-01-24 NOTE — PROGRESS NOTE ADULT - SUBJECTIVE AND OBJECTIVE BOX
Anesthesia Pain Management Service    SUBJECTIVE: Patient is doing well with IV PCA and no significant problems reported. Patient complained that last night there was a time where she was in pain because pain pump battery needed changing.  Currently, patient is feeling comfortable with IV PCA.  Patient is lying in bed with family members at bedside.    Pain Scale Score	At rest: __5/10_able to mouth out pain score 	With Activity: ___ 	[X ] Refer to charted pain scores    THERAPY:    [ ] IV PCA Morphine		[ ] 5 mg/mL	[ ] 1 mg/mL  [X ] IV PCA Hydromorphone	[ ] 5 mg/mL	[X ] 1 mg/mL  [ ] IV PCA Fentanyl		[ ] 50 micrograms/mL    Demand dose __0.2_ lockout __6_ (minutes) Continuous Rate _0__ Total: _9.8__  mg used (in past 24 hours)      MEDICATIONS  (STANDING):  ALBUTerol    90 MICROgram(s) HFA Inhaler 1 Puff(s) Inhalation every 6 hours  amLODIPine   Tablet 10 milliGRAM(s) Oral daily  buDESOnide 160 MICROgram(s)/formoterol 4.5 MICROgram(s) Inhaler 2 Puff(s) Inhalation two times a day  chlorhexidine 0.12% Liquid 15 milliLiter(s) Swish and Spit two times a day  chlorhexidine 4% Liquid 1 Application(s) Topical <User Schedule>  collagenase Ointment 1 Application(s) Topical daily  enoxaparin Injectable 40 milliGRAM(s) SubCutaneous two times a day  furosemide   Injectable 40 milliGRAM(s) IV Push daily  gabapentin   Solution 400 milliGRAM(s) Oral three times a day  HYDROmorphone PCA (1 mG/mL) 30 milliLiter(s) PCA Continuous PCA Continuous  influenza   Vaccine 0.5 milliLiter(s) IntraMuscular once  insulin glargine Injectable (LANTUS) 22 Unit(s) SubCutaneous at bedtime  insulin lispro (HumaLOG) corrective regimen sliding scale   SubCutaneous every 8 hours  insulin lispro Injectable (HumaLOG) 10 Unit(s) SubCutaneous every 8 hours  levothyroxine 137 MICROGram(s) Oral daily  metoprolol tartrate 25 milliGRAM(s) Enteral Tube two times a day  pantoprazole   Suspension 40 milliGRAM(s) Oral daily  predniSONE   Tablet 10 milliGRAM(s) Oral daily  simvastatin 20 milliGRAM(s) Oral at bedtime  sodium chloride 3%  Inhalation 3 milliLiter(s) Inhalation two times a day    MEDICATIONS  (PRN):  HYDROmorphone PCA (1 mG/mL) Rescue Clinician Bolus 0.5 milliGRAM(s) IV Push every 15 minutes PRN for Pain Scale GREATER THAN 6  naloxone Injectable 0.1 milliGRAM(s) IV Push every 3 minutes PRN For ANY of the following changes in patient status:  A. RR LESS THAN 10 breaths per minute, B. Oxygen saturation LESS THAN 90%, C. Sedation score of 6  ondansetron Injectable 4 milliGRAM(s) IV Push every 6 hours PRN Nausea      OBJECTIVE: Patient lying in bed, intubated, NGT clamped, and looks comfortable.    Sedation Score:	[ X] Alert	[ ] Drowsy 	[ ] Arousable	[ ] Asleep	[ ] Unresponsive    Side Effects:	[X ] None	[ ] Nausea	[ ] Vomiting	[ ] Pruritus  		[ ] Other:    Vital Signs Last 24 Hrs  T(C): 37.7 (24 Jan 2019 08:00), Max: 37.7 (24 Jan 2019 08:00)  T(F): 99.8 (24 Jan 2019 08:00), Max: 99.8 (24 Jan 2019 08:00)  HR: 108 (24 Jan 2019 10:43) (91 - 123)  BP: 123/66 (24 Jan 2019 10:00) (85/63 - 158/78)  BP(mean): 78 (24 Jan 2019 10:00) (60 - 128)  RR: 22 (24 Jan 2019 10:00) (15 - 27)  SpO2: 92% (24 Jan 2019 10:43) (85% - 98%)    ASSESSMENT/ PLAN    Therapy to  be:	[ X] Continue   [ ] Discontinued   [ ] Change to prn Analgesics    Documentation and Verification of current medications:   [X] Done	[ ] Not done, not elligible    Comments:  Patient's continuous rate on the IV PCA discontinued.  Continue IV PCA with new settings and other pain regimen ordered.  Recommend Psychiatry consult to evaluate patient and current medications.

## 2019-01-24 NOTE — PROGRESS NOTE ADULT - ASSESSMENT
63F w/ multiple respiratory comorbidities p/w SBO s/p ex-lap with findings of healthy bowel c/b evisceration of bowel requiring placement of retention sutures, difficult to wean off ventilation, MATEO done 01/23 to evaluate endocarditis, tolerating CPAP with anticipation for extubation today     Plan  - Per family and patient's wishes, planning for transfer to [facility] is in progress  - awaiting insurance approval for transfer, patient to be monitored in MICU in the interim

## 2019-01-24 NOTE — PROGRESS NOTE ADULT - SUBJECTIVE AND OBJECTIVE BOX
INTERVAL HPI/OVERNIGHT EVENTS:     SUBJECTIVE: Patient seen and examined at bedside.     OBJECTIVE:    VITAL SIGNS:  ICU Vital Signs Last 24 Hrs  T(C): 37.6 (24 Jan 2019 04:00), Max: 37.6 (24 Jan 2019 04:00)  T(F): 99.6 (24 Jan 2019 04:00), Max: 99.6 (24 Jan 2019 04:00)  HR: 114 (24 Jan 2019 07:00) (91 - 123)  BP: 100/56 (24 Jan 2019 07:00) (85/63 - 158/78)  BP(mean): 66 (24 Jan 2019 07:00) (60 - 128)  ABP: --  ABP(mean): --  RR: 16 (24 Jan 2019 07:00) (15 - 23)  SpO2: 92% (24 Jan 2019 07:00) (90% - 98%)    Mode: CPAP with PS, FiO2: 70, PEEP: 5, PS: 15, MAP: 11, PIP: 22    01-23 @ 07:01  -  01-24 @ 07:00  --------------------------------------------------------  IN: 1304.4 mL / OUT: 2500 mL / NET: -1195.6 mL      CAPILLARY BLOOD GLUCOSE      POCT Blood Glucose.: 164 mg/dL (24 Jan 2019 05:35)      PHYSICAL EXAM:    General: NAD  HEENT: NC/AT; PERRL, clear conjunctiva  Neck: supple  Respiratory: CTA b/l  Cardiovascular: +S1/S2; RRR  Abdomen: soft, NT/ND; +BS x4  Extremities: WWP, 2+ peripheral pulses b/l; no LE edema  Skin: normal color and turgor; no rash  Neurological:    MEDICATIONS:  MEDICATIONS  (STANDING):  ALBUTerol    90 MICROgram(s) HFA Inhaler 1 Puff(s) Inhalation every 6 hours  amLODIPine   Tablet 10 milliGRAM(s) Oral daily  buDESOnide 160 MICROgram(s)/formoterol 4.5 MICROgram(s) Inhaler 2 Puff(s) Inhalation two times a day  chlorhexidine 0.12% Liquid 15 milliLiter(s) Swish and Spit two times a day  chlorhexidine 4% Liquid 1 Application(s) Topical <User Schedule>  collagenase Ointment 1 Application(s) Topical daily  enoxaparin Injectable 40 milliGRAM(s) SubCutaneous two times a day  furosemide   Injectable 40 milliGRAM(s) IV Push daily  gabapentin   Solution 400 milliGRAM(s) Oral three times a day  influenza   Vaccine 0.5 milliLiter(s) IntraMuscular once  insulin glargine Injectable (LANTUS) 22 Unit(s) SubCutaneous at bedtime  insulin lispro (HumaLOG) corrective regimen sliding scale   SubCutaneous every 8 hours  insulin lispro Injectable (HumaLOG) 10 Unit(s) SubCutaneous every 8 hours  insulin lispro Injectable (HumaLOG) 5 Unit(s) SubCutaneous once  levothyroxine 137 MICROGram(s) Oral daily  metoprolol tartrate 25 milliGRAM(s) Enteral Tube two times a day  pantoprazole   Suspension 40 milliGRAM(s) Oral daily  predniSONE   Tablet 10 milliGRAM(s) Oral daily  simvastatin 20 milliGRAM(s) Oral at bedtime  sodium chloride 3%  Inhalation 3 milliLiter(s) Inhalation two times a day  vancomycin  IVPB 1000 milliGRAM(s) IV Intermittent two times a day    MEDICATIONS  (PRN):  naloxone Injectable 0.1 milliGRAM(s) IV Push every 3 minutes PRN For ANY of the following changes in patient status:  A. RR LESS THAN 10 breaths per minute, B. Oxygen saturation LESS THAN 90%, C. Sedation score of 6  ondansetron Injectable 4 milliGRAM(s) IV Push every 6 hours PRN Nausea      ALLERGIES:  Allergies    Allergy Status Unknown    Intolerances    Seroquel (Other)      LABS:                        8.9    10.09 )-----------( 428      ( 24 Jan 2019 04:00 )             30.2     Hemoglobin: 8.9 g/dL (01-24 @ 04:00)  Hemoglobin: 8.7 g/dL (01-23 @ 05:40)  Hemoglobin: 8.5 g/dL (01-22 @ 02:40)  Hemoglobin: 8.9 g/dL (01-21 @ 03:00)  Hemoglobin: 8.6 g/dL (01-20 @ 02:50)    CBC Full  -  ( 24 Jan 2019 04:00 )  WBC Count : 10.09 K/uL  Hemoglobin : 8.9 g/dL  Hematocrit : 30.2 %  Platelet Count - Automated : 428 K/uL  Mean Cell Volume : 102.0 fL  Mean Cell Hemoglobin : 30.1 pg  Mean Cell Hemoglobin Concentration : 29.5 %  Auto Neutrophil # : 5.46 K/uL  Auto Lymphocyte # : 2.65 K/uL  Auto Monocyte # : 1.33 K/uL  Auto Eosinophil # : 0.37 K/uL  Auto Basophil # : 0.08 K/uL  Auto Neutrophil % : 54.0 %  Auto Lymphocyte % : 26.3 %  Auto Monocyte % : 13.2 %  Auto Eosinophil % : 3.7 %  Auto Basophil % : 0.8 %    01-24    144  |  100  |  21  ----------------------------<  163<H>  3.6   |  30  |  0.82    Ca    8.9      24 Jan 2019 04:00  Phos  3.8     01-24  Mg     2.1     01-24    TPro  6.5  /  Alb  3.2<L>  /  TBili  0.2  /  DBili  x   /  AST  21  /  ALT  20  /  AlkPhos  111  01-24    Creatinine Trend: 0.82<--, 0.71<--, 0.65<--, 0.68<--, 0.74<--, 0.78<--  LIVER FUNCTIONS - ( 24 Jan 2019 04:00 )  Alb: 3.2 g/dL / Pro: 6.5 g/dL / ALK PHOS: 111 u/L / ALT: 20 u/L / AST: 21 u/L / GGT: x                 ABG - ( 23 Jan 2019 05:40 )  pH, Arterial: 7.35  pH, Blood: x     /  pCO2: 64    /  pO2: 34    / HCO3: 31    / Base Excess: 8.9   /  SaO2: 54.1                        EKG:   MICROBIOLOGY:    IMAGING:    RADIOLOGY & ADDITIONAL TESTS: Reviewed. INTERVAL HPI/OVERNIGHT EVENTS: Extubated to BIPAP, tolerating well. Supratherapeutic vanc level, rechecking at 1630: if <20 will start 750mg Q12, if >20 will check level in morning.     SUBJECTIVE: Patient seen and examined at bedside.     OBJECTIVE:    VITAL SIGNS:  ICU Vital Signs Last 24 Hrs  T(C): 37.6 (24 Jan 2019 04:00), Max: 37.6 (24 Jan 2019 04:00)  T(F): 99.6 (24 Jan 2019 04:00), Max: 99.6 (24 Jan 2019 04:00)  HR: 114 (24 Jan 2019 07:00) (91 - 123)  BP: 100/56 (24 Jan 2019 07:00) (85/63 - 158/78)  BP(mean): 66 (24 Jan 2019 07:00) (60 - 128)  ABP: --  ABP(mean): --  RR: 16 (24 Jan 2019 07:00) (15 - 23)  SpO2: 92% (24 Jan 2019 07:00) (90% - 98%)    Mode: CPAP with PS, FiO2: 70, PEEP: 5, PS: 15, MAP: 11, PIP: 22    01-23 @ 07:01  -  01-24 @ 07:00  --------------------------------------------------------  IN: 1304.4 mL / OUT: 2500 mL / NET: -1195.6 mL      CAPILLARY BLOOD GLUCOSE      POCT Blood Glucose.: 164 mg/dL (24 Jan 2019 05:35)      PHYSICAL EXAM:    General: NAD  HEENT: NC/AT; PERRL, clear conjunctiva  Neck: supple  Respiratory: CTA b/l  Cardiovascular: +S1/S2; RRR  Abdomen: soft, NT/ND; +BS x4  Extremities: WWP, 2+ peripheral pulses b/l; no LE edema  Skin: normal color and turgor; no rash  Neurological:    MEDICATIONS:  MEDICATIONS  (STANDING):  ALBUTerol    90 MICROgram(s) HFA Inhaler 1 Puff(s) Inhalation every 6 hours  amLODIPine   Tablet 10 milliGRAM(s) Oral daily  buDESOnide 160 MICROgram(s)/formoterol 4.5 MICROgram(s) Inhaler 2 Puff(s) Inhalation two times a day  chlorhexidine 0.12% Liquid 15 milliLiter(s) Swish and Spit two times a day  chlorhexidine 4% Liquid 1 Application(s) Topical <User Schedule>  collagenase Ointment 1 Application(s) Topical daily  enoxaparin Injectable 40 milliGRAM(s) SubCutaneous two times a day  furosemide   Injectable 40 milliGRAM(s) IV Push daily  gabapentin   Solution 400 milliGRAM(s) Oral three times a day  influenza   Vaccine 0.5 milliLiter(s) IntraMuscular once  insulin glargine Injectable (LANTUS) 22 Unit(s) SubCutaneous at bedtime  insulin lispro (HumaLOG) corrective regimen sliding scale   SubCutaneous every 8 hours  insulin lispro Injectable (HumaLOG) 10 Unit(s) SubCutaneous every 8 hours  insulin lispro Injectable (HumaLOG) 5 Unit(s) SubCutaneous once  levothyroxine 137 MICROGram(s) Oral daily  metoprolol tartrate 25 milliGRAM(s) Enteral Tube two times a day  pantoprazole   Suspension 40 milliGRAM(s) Oral daily  predniSONE   Tablet 10 milliGRAM(s) Oral daily  simvastatin 20 milliGRAM(s) Oral at bedtime  sodium chloride 3%  Inhalation 3 milliLiter(s) Inhalation two times a day  vancomycin  IVPB 1000 milliGRAM(s) IV Intermittent two times a day    MEDICATIONS  (PRN):  naloxone Injectable 0.1 milliGRAM(s) IV Push every 3 minutes PRN For ANY of the following changes in patient status:  A. RR LESS THAN 10 breaths per minute, B. Oxygen saturation LESS THAN 90%, C. Sedation score of 6  ondansetron Injectable 4 milliGRAM(s) IV Push every 6 hours PRN Nausea      ALLERGIES:  Allergies    Allergy Status Unknown    Intolerances    Seroquel (Other)      LABS:                        8.9    10.09 )-----------( 428      ( 24 Jan 2019 04:00 )             30.2     Hemoglobin: 8.9 g/dL (01-24 @ 04:00)  Hemoglobin: 8.7 g/dL (01-23 @ 05:40)  Hemoglobin: 8.5 g/dL (01-22 @ 02:40)  Hemoglobin: 8.9 g/dL (01-21 @ 03:00)  Hemoglobin: 8.6 g/dL (01-20 @ 02:50)    CBC Full  -  ( 24 Jan 2019 04:00 )  WBC Count : 10.09 K/uL  Hemoglobin : 8.9 g/dL  Hematocrit : 30.2 %  Platelet Count - Automated : 428 K/uL  Mean Cell Volume : 102.0 fL  Mean Cell Hemoglobin : 30.1 pg  Mean Cell Hemoglobin Concentration : 29.5 %  Auto Neutrophil # : 5.46 K/uL  Auto Lymphocyte # : 2.65 K/uL  Auto Monocyte # : 1.33 K/uL  Auto Eosinophil # : 0.37 K/uL  Auto Basophil # : 0.08 K/uL  Auto Neutrophil % : 54.0 %  Auto Lymphocyte % : 26.3 %  Auto Monocyte % : 13.2 %  Auto Eosinophil % : 3.7 %  Auto Basophil % : 0.8 %    01-24    144  |  100  |  21  ----------------------------<  163<H>  3.6   |  30  |  0.82    Ca    8.9      24 Jan 2019 04:00  Phos  3.8     01-24  Mg     2.1     01-24    TPro  6.5  /  Alb  3.2<L>  /  TBili  0.2  /  DBili  x   /  AST  21  /  ALT  20  /  AlkPhos  111  01-24    Creatinine Trend: 0.82<--, 0.71<--, 0.65<--, 0.68<--, 0.74<--, 0.78<--  LIVER FUNCTIONS - ( 24 Jan 2019 04:00 )  Alb: 3.2 g/dL / Pro: 6.5 g/dL / ALK PHOS: 111 u/L / ALT: 20 u/L / AST: 21 u/L / GGT: x                 ABG - ( 23 Jan 2019 05:40 )  pH, Arterial: 7.35  pH, Blood: x     /  pCO2: 64    /  pO2: 34    / HCO3: 31    / Base Excess: 8.9   /  SaO2: 54.1                        EKG:   MICROBIOLOGY:    IMAGING:    RADIOLOGY & ADDITIONAL TESTS: Reviewed.

## 2019-01-24 NOTE — PROGRESS NOTE ADULT - ASSESSMENT
63 year old female with RA immunosuppressed from chronic steroids, underlying DM, presented on 12/15 with SBO.  S/p laparotomy and subsequent revision, now with respiratory failure and sepsis due to an enterococcal bacteremia.  Course/Infection complicated by underlying DM/ immuno suppression. High risk for complications.    Critically ill with Respiratory failure    1) Enterococcal bacteremia: Enterococcus in the blood is most often form a , abdominal, wound or line infection.   the patient has risk for all of the above.    Recent C ta/p without abscess but concern for midline wound infection  -Wound culture with mixed growth (including enterococcus but different species)    -Repeat blood culture without growth  -MATEO without vegetation per my discussion with ICU team    -Continue vancomycin through 1/31/2019  therapeutic drug monitoring of vanco through before 4th dose    2) Abdominal Wound: retention sutures in place  Improved  No abscess on CT    Continue Wound Care    3) Immunosuppressed: due ot steroid use  Impairs wound healing    4) DM: glycemic control will help wound healing

## 2019-01-24 NOTE — PROGRESS NOTE ADULT - SUBJECTIVE AND OBJECTIVE BOX
B-Team Surgery Progress Note     Subjective/24hour Events: no acute event overnight, MATEO performed yesterday and patient tolerated procedure well, on CPAP with anticipation of extubation today.      Vital Signs:  Vital Signs Last 24 Hrs  T(C): 37.7 (24 Jan 2019 08:00), Max: 37.7 (24 Jan 2019 08:00)  T(F): 99.8 (24 Jan 2019 08:00), Max: 99.8 (24 Jan 2019 08:00)  HR: 112 (24 Jan 2019 11:31) (91 - 123)  BP: 118/95 (24 Jan 2019 11:00) (85/63 - 158/78)  BP(mean): 98 (24 Jan 2019 11:00) (60 - 128)  RR: 22 (24 Jan 2019 11:00) (15 - 27)  SpO2: 95% (24 Jan 2019 11:31) (85% - 98%)    Physical Exam:  Gen: NAD, awake and interactive   LS: on , 5, 60%, PSV15, with good gas exchange   Card: pulse regularly and vital sign stable per monitoring   GI: soft, non-distended, non-tender, retention sutures in place with fibrinous exudate     CBC (01-24 @ 04:00)                          8.9<L>                   10.09   )--------------(  428<H>     54.0  % Neuts, 26.3  % Lymphs, ANC: 5.46                            30.2<L>  CBC (01-23 @ 05:40)                          8.7<L>                   8.98    )--------------(  450<H>     56.2  % Neuts, 24.7  % Lymphs, ANC: 5.05                            30.3<L>    BMP (01-24 @ 04:00)       144     |  100     |  21    			Ca++ --      Ca 8.9          ---------------------------------( 163<H>		Mg 2.1          3.6     |  30      |  0.82  			Ph 3.8     BMP (01-23 @ 05:40)       144     |  101     |  18    			Ca++ --      Ca 8.9          ---------------------------------( 139<H>		Mg 2.2          3.9     |  31      |  0.71  			Ph 3.5       LFTs (01-24 @ 04:00)      TPro 6.5 / Alb 3.2<L> / TBili 0.2 / DBili -- / AST 21 / ALT 20 / AlkPhos 111        ABG (01-23 @ 05:40)     7.35 / 64<H> / 34<LL> / 31<H> / 8.9 / 54.1<L>%     Lactate: 1.8         -> BLOOD VENOUS Culture (01-17 @ 10:09)     NG    NG  NG

## 2019-01-24 NOTE — PROGRESS NOTE ADULT - ASSESSMENT
63y Female hx of COPD presented initially with SBO, underwent ex lap 12/15 closed with 4 retention sutures, evicerated on 12/30 and closed with 6 more retention sutures, c/b multifocal pneumonia, developed hypercapnic respiratory failure requiring intubation 1/6, s/p bronch 1/9/18, improving and tolerating CPAP, still intubated, course further c/b Enterococci bacteremia. Transferred to MICU at request of patient/family.     Neuro:  - continue ativan 0.5 mg PO PRN for discomfort/ anxiety  - ct PCA dilaudid, pt with chronic opioid requirement  - ct gabapentin to 400mg tid    Resp:  - Intubated, CPAPing with sats in the low 90s  - C/w albuterol and Symbicort   - pulm following, will f/u recs   - Planning for extubation trail today    CV:   - Continue amlodipine 10mg  - Metoprolol 25mg po BID   - Lasix 40 mg IV QD, will give another dose of lasix prior to extubation today    GI:  - s/p 2*ex-lap, 10 retention sutures in situ, gaping wound covered in fibrinous exudate   - santyl to wound daily  - c/w protonix daily, home med  - holding bolus tube feeds leading up to extubation     Renal:  - BMP and I&O monitoring as routine, stable BMP   - iraheta removed, patient spontaneously voiding   - Repletion of electrolytes PRN    Heme:  - H/H stable   - VTE ppx w/ Lovenox and IPCs--increasing lovenox to BID in line with weight based dosing     ID:  - blood cultures 1/12 growing Enterococcus faecium, repeat blood culture 1/17 NGTD  - lines: iraheta removed, rt IJV (1/6) changed to lt IJV 1/13,  ET 1/6  - Source of bacteremia not identified may be 2/2 abdominal wound (no abscess on CT) or other. ID following and recommend considering MATEO and continuance of Vancomycin, will plan to resume abx  - c/w vancomycin 1g Q12hrs -- ID recs 2-4 week course    Endo:  - hx RA on chronic prednisone, DM, hypothyroid  - Hydrocortisone changed to home Prednisone dose 10mg Po ngt  - Synthroid 137mcg od through ngt on empty stomach    - Lantus 26 units q hs and Humalog 10units TID AC (prior to bolus feeds) along with low dose corrective scale to cover, monitor sugar levels and adjust insulin accordingly     Lines:  -left IJ central line  -amaury feeding tube  -ET 63y Female hx of COPD presented initially with SBO, underwent ex lap 12/15 closed with 4 retention sutures, evicerated on 12/30 and closed with 6 more retention sutures, c/b multifocal pneumonia, developed hypercapnic respiratory failure requiring intubation 1/6, s/p bronch 1/9/18, improving and tolerating CPAP, still intubated, course further c/b Enterococci bacteremia. Transferred to MICU at request of patient/family.     Neuro:  - continue ativan 0.5 mg PO PRN for discomfort/ anxiety  - continuous PCA dilaudid D/Rob on 1/24, still has PRN pump, pain following  - ct gabapentin to 400mg tid    Resp:  - Intubated 1/6, extubated to BIPAP 1/24  - C/w albuterol and Symbicort   - pulm following, will f/u recs     CV:   - Continue amlodipine 10mg  - Metoprolol 25mg po BID   - Lasix 40 mg IV QD    GI:  - s/p 2*ex-lap, 10 retention sutures in situ, gaping wound covered in fibrinous exudate   - santyl to wound daily  - c/w protonix daily, home med  - bolus feeds    Renal:  - BMP and I&O monitoring as routine, stable BMP   - iraheta removed, patient spontaneously voiding   - Repletion of electrolytes PRN    Heme:  - H/H stable   - VTE ppx w/ Lovenox and IPCs--increasing lovenox to BID in line with weight based dosing     ID:  - blood cultures 1/12 growing Enterococcus faecium, repeat blood culture 1/17 NGTD  - lines: iraheta removed, rt IJV (1/6) changed to lt IJV 1/13,  ET 1/6  - Source of bacteremia not identified may be 2/2 abdominal wound (no abscess on CT) or other. ID following and recommend considering MATEO and continuance of Vancomycin, will plan to resume abx  - c/w vancomycin 1g Q12hrs -- ID recs 2-4 week course-0 Supratherapeutic vanc level, rechecking at 1630: if <20 will start 750mg Q12, if >20 will check level in morning.     Endo:  - hx RA on chronic prednisone, DM, hypothyroid  - Hydrocortisone changed to home Prednisone dose 10mg Po ngt  - Synthroid 137mcg od through ngt on empty stomach    - Lantus 26 units q hs and Humalog 10units TID AC (prior to bolus feeds) along with low dose corrective scale to cover, monitor sugar levels and adjust insulin accordingly     Lines:  -left IJ central line  -amaury feeding tube  -ET 63y Female hx of COPD presented initially with SBO, underwent ex lap 12/15 closed with 4 retention sutures, evicerated on 12/30 and closed with 6 more retention sutures, c/b multifocal pneumonia, developed hypercapnic respiratory failure requiring intubation 1/6, s/p bronch 1/9/18, improving and tolerating CPAP, still intubated, course further c/b Enterococci bacteremia. Transferred to MICU at request of patient/family.     Neuro:  - continue ativan 0.5 mg PO PRN for discomfort/ anxiety  - continuous PCA dilaudid D/Rob on 1/24, still has PRN pump, pain following  - ct gabapentin to 400mg tid    Resp:  - Intubated 1/6, extubated to BIPAP 1/24  - C/w albuterol and Symbicort   - pulm following, will f/u recs     CV:   - Continue amlodipine 10mg  - Metoprolol 25mg po BID   - Lasix 40 mg IV QD    GI:  - s/p 2*ex-lap, 10 retention sutures in situ, gaping wound covered in fibrinous exudate   - santyl to wound daily  - c/w protonix daily, home med  - bolus feeds    Renal:  - BMP and I&O monitoring as routine, stable BMP   - iraheta removed, patient spontaneously voiding   - Repletion of electrolytes PRN    Heme:  - H/H stable   - VTE ppx w/ Lovenox and IPCs--increasing lovenox to BID in line with weight based dosing     ID:  - blood cultures 1/12 growing Enterococcus faecium, repeat blood culture 1/17 NGTD  - lines: iraheta removed, rt IJV (1/6) changed to lt IJV 1/13,  ET 1/6  - Source of bacteremia not identified may be 2/2 abdominal wound (no abscess on CT) or other. ID following and recommend considering MATEO and continuance of Vancomycin, will plan to resume abx  - c/w vancomycin 1g Q12hrs -- ID recs 2 week course (will D/C on 1/31)- Supratherapeutic vanc level, rechecking at 1630: if <20 will start 750mg Q12, if >20 will check level in morning.     Endo:  - hx RA on chronic prednisone, DM, hypothyroid  - Hydrocortisone changed to home Prednisone dose 10mg Po ngt  - Synthroid 137mcg od through ngt on empty stomach    - Lantus 26 units q hs and Humalog 10units TID AC (prior to bolus feeds) along with low dose corrective scale to cover, monitor sugar levels and adjust insulin accordingly     Lines:  -left IJ central line  -amaury feeding tube  -ET

## 2019-01-24 NOTE — PROGRESS NOTE ADULT - SUBJECTIVE AND OBJECTIVE BOX
Anesthesia Pain Management Service- Attending Addendum    SUBJECTIVE: Patient's pain control adequate    Therapy:	  [ X] IV PCA	   [ ] Epidural           [ ] s/p Spinal Opoid              [ ] Postpartum infusion	  [ ] Patient controlled regional anesthesia (PCRA)    [ ] prn Analgesics    Allergies    Allergy Status Unknown    Intolerances    Seroquel (Other)    MEDICATIONS  (STANDING):  ALBUTerol    90 MICROgram(s) HFA Inhaler 1 Puff(s) Inhalation every 6 hours  amLODIPine   Tablet 10 milliGRAM(s) Oral daily  buDESOnide 160 MICROgram(s)/formoterol 4.5 MICROgram(s) Inhaler 2 Puff(s) Inhalation two times a day  chlorhexidine 0.12% Liquid 15 milliLiter(s) Swish and Spit two times a day  chlorhexidine 4% Liquid 1 Application(s) Topical <User Schedule>  collagenase Ointment 1 Application(s) Topical daily  enoxaparin Injectable 40 milliGRAM(s) SubCutaneous two times a day  furosemide   Injectable 40 milliGRAM(s) IV Push daily  gabapentin   Solution 400 milliGRAM(s) Oral three times a day  HYDROmorphone PCA (1 mG/mL) 30 milliLiter(s) PCA Continuous PCA Continuous  influenza   Vaccine 0.5 milliLiter(s) IntraMuscular once  insulin glargine Injectable (LANTUS) 22 Unit(s) SubCutaneous at bedtime  insulin lispro (HumaLOG) corrective regimen sliding scale   SubCutaneous every 8 hours  insulin lispro Injectable (HumaLOG) 10 Unit(s) SubCutaneous every 8 hours  levothyroxine 137 MICROGram(s) Oral daily  metoprolol tartrate 25 milliGRAM(s) Enteral Tube two times a day  pantoprazole   Suspension 40 milliGRAM(s) Oral daily  predniSONE   Tablet 10 milliGRAM(s) Oral daily  simvastatin 20 milliGRAM(s) Oral at bedtime  sodium chloride 3%  Inhalation 3 milliLiter(s) Inhalation two times a day    MEDICATIONS  (PRN):  HYDROmorphone PCA (1 mG/mL) Rescue Clinician Bolus 0.5 milliGRAM(s) IV Push every 15 minutes PRN for Pain Scale GREATER THAN 6  naloxone Injectable 0.1 milliGRAM(s) IV Push every 3 minutes PRN For ANY of the following changes in patient status:  A. RR LESS THAN 10 breaths per minute, B. Oxygen saturation LESS THAN 90%, C. Sedation score of 6  ondansetron Injectable 4 milliGRAM(s) IV Push every 6 hours PRN Nausea      OBJECTIVE:   [X] No new signs     [ ] Other:    Side Effects:  [X ] None			[ ] Other:      ASSESSMENT/PLAN  -Discontinue current therapy    [ ] Therapy changed to:    [ ] IV PCA       [ ] Epidural     [ X] prn Analgesics     Comments: Pain management per primary team, APS to sign off

## 2019-01-24 NOTE — PROGRESS NOTE ADULT - SUBJECTIVE AND OBJECTIVE BOX
Follow Up:      Inverval History/ROS:Patient is a 63y old  Female who presents with a chief complaint of abd pain (23 Jan 2019 10:44)    Extubated.  On BIPAP.  Deneis pain. no fever.    Allergies    Allergy Status Unknown    Intolerances    Seroquel (Other)      ANTIMICROBIALS:      OTHER MEDS:  ALBUTerol    90 MICROgram(s) HFA Inhaler 1 Puff(s) Inhalation every 6 hours  amLODIPine   Tablet 10 milliGRAM(s) Oral daily  buDESOnide 160 MICROgram(s)/formoterol 4.5 MICROgram(s) Inhaler 2 Puff(s) Inhalation two times a day  chlorhexidine 0.12% Liquid 15 milliLiter(s) Swish and Spit two times a day  chlorhexidine 4% Liquid 1 Application(s) Topical <User Schedule>  collagenase Ointment 1 Application(s) Topical daily  enoxaparin Injectable 40 milliGRAM(s) SubCutaneous two times a day  furosemide   Injectable 40 milliGRAM(s) IV Push daily  gabapentin   Solution 400 milliGRAM(s) Oral three times a day  HYDROmorphone PCA (1 mG/mL) 30 milliLiter(s) PCA Continuous PCA Continuous  HYDROmorphone PCA (1 mG/mL) Rescue Clinician Bolus 0.5 milliGRAM(s) IV Push every 15 minutes PRN  influenza   Vaccine 0.5 milliLiter(s) IntraMuscular once  insulin glargine Injectable (LANTUS) 22 Unit(s) SubCutaneous at bedtime  insulin lispro (HumaLOG) corrective regimen sliding scale   SubCutaneous every 8 hours  insulin lispro Injectable (HumaLOG) 10 Unit(s) SubCutaneous every 8 hours  levothyroxine 137 MICROGram(s) Oral daily  metoprolol tartrate 25 milliGRAM(s) Enteral Tube two times a day  naloxone Injectable 0.1 milliGRAM(s) IV Push every 3 minutes PRN  ondansetron Injectable 4 milliGRAM(s) IV Push every 6 hours PRN  pantoprazole   Suspension 40 milliGRAM(s) Oral daily  predniSONE   Tablet 10 milliGRAM(s) Oral daily  simvastatin 20 milliGRAM(s) Oral at bedtime  sodium chloride 3%  Inhalation 3 milliLiter(s) Inhalation two times a day      Vital Signs Last 24 Hrs  T(C): 37.6 (24 Jan 2019 12:00), Max: 37.7 (24 Jan 2019 08:00)  T(F): 99.7 (24 Jan 2019 12:00), Max: 99.8 (24 Jan 2019 08:00)  HR: 104 (24 Jan 2019 15:30) (91 - 122)  BP: 143/126 (24 Jan 2019 14:00) (85/63 - 145/69)  BP(mean): 130 (24 Jan 2019 14:00) (60 - 130)  RR: 21 (24 Jan 2019 15:31) (15 - 27)  SpO2: 99% (24 Jan 2019 15:31) (85% - 100%)    PHYSICAL EXAM:  General: [x ] non-toxic, BIPAP  HEAD/EYES: [ ] PERRL [x ] white sclera [ ] icterus  ENT:  [ ] normal [ x] supple [ ] thrush [ ] pharyngeal exudate  Cardiovascular:   [ ] murmur [x ] normal [ ] PPM/AICD  Respiratory:  [x ] clourse BSy  GI:  [x ] soft, non-tender, normal bowel sounds    abd incision: retention sutures, looks more dry  :  [ ] iraheta [ ] no CVA tenderness   Musculoskeletal:  [x ] no synovitis  Neurologic:  [ ] non-focal exam   Skin:  [x ] no rash  Lymph: [x ] no lymphadenopathy  Psychiatric:  [x ] appropriate affect [ ] alert & oriented  Lines:  [ x] no phlebitis [ ] central line                                8.9    10.09 )-----------( 428      ( 24 Jan 2019 04:00 )             30.2       01-24    144  |  100  |  21  ----------------------------<  163<H>  3.6   |  30  |  0.82    Ca    8.9      24 Jan 2019 04:00  Phos  3.8     01-24  Mg     2.1     01-24    TPro  6.5  /  Alb  3.2<L>  /  TBili  0.2  /  DBili  x   /  AST  21  /  ALT  20  /  AlkPhos  111  01-24          MICROBIOLOGY:    RADIOLOGY:

## 2019-01-24 NOTE — PROGRESS NOTE ADULT - ATTENDING COMMENTS
63 year old woman with COPD on home O2 and AVAPS, RA admitted with SBO, underwent ex lap 12/15, wound dehiscence on 12/30 required repeat surgery, developed multifocal pneumonia and hypercapnic respiratory failure requiring re- intubation 1/6, s/p bronch 1/9/18, Enterococci bacteremia. Transferred to MICU for further management    - Dilaudid PCA for pain control  - wound care as per surgery  - tolerating tube feeds  - awake and alert doing well on CPAP  - extubated this morning transitioned to high flow oxygen  - repeat cultures from 1/17 is negative last positive cultures are from 1/12  - MATEO negative for vegetations    - hemodynamically stable  - patient has expressed that she would like to be re-intubated if extubation trial fails    critical care time 40 minutes  case discussed with sons at bedside

## 2019-01-25 LAB
ANION GAP SERPL CALC-SCNC: 12 MMO/L — SIGNIFICANT CHANGE UP (ref 7–14)
BUN SERPL-MCNC: 13 MG/DL — SIGNIFICANT CHANGE UP (ref 7–23)
C DIFF TOX GENS STL QL NAA+PROBE: SIGNIFICANT CHANGE UP
CALCIUM SERPL-MCNC: 8.8 MG/DL — SIGNIFICANT CHANGE UP (ref 8.4–10.5)
CHLORIDE SERPL-SCNC: 107 MMOL/L — SIGNIFICANT CHANGE UP (ref 98–107)
CO2 SERPL-SCNC: 30 MMOL/L — SIGNIFICANT CHANGE UP (ref 22–31)
CREAT SERPL-MCNC: 0.73 MG/DL — SIGNIFICANT CHANGE UP (ref 0.5–1.3)
GLUCOSE BLDC GLUCOMTR-MCNC: 123 MG/DL — HIGH (ref 70–99)
GLUCOSE BLDC GLUCOMTR-MCNC: 152 MG/DL — HIGH (ref 70–99)
GLUCOSE BLDC GLUCOMTR-MCNC: 162 MG/DL — HIGH (ref 70–99)
GLUCOSE BLDC GLUCOMTR-MCNC: 230 MG/DL — HIGH (ref 70–99)
GLUCOSE SERPL-MCNC: 111 MG/DL — HIGH (ref 70–99)
HCT VFR BLD CALC: 29.6 % — LOW (ref 34.5–45)
HGB BLD-MCNC: 8.9 G/DL — LOW (ref 11.5–15.5)
MAGNESIUM SERPL-MCNC: 2.1 MG/DL — SIGNIFICANT CHANGE UP (ref 1.6–2.6)
MCHC RBC-ENTMCNC: 30.1 % — LOW (ref 32–36)
MCHC RBC-ENTMCNC: 30.5 PG — SIGNIFICANT CHANGE UP (ref 27–34)
MCV RBC AUTO: 101.4 FL — HIGH (ref 80–100)
NRBC # FLD: 0.12 K/UL — LOW (ref 25–125)
NRBC FLD-RTO: 1.5 — SIGNIFICANT CHANGE UP
PHOSPHATE SERPL-MCNC: 2.4 MG/DL — LOW (ref 2.5–4.5)
PLATELET # BLD AUTO: 382 K/UL — SIGNIFICANT CHANGE UP (ref 150–400)
PMV BLD: 9.3 FL — SIGNIFICANT CHANGE UP (ref 7–13)
POTASSIUM SERPL-MCNC: 3.7 MMOL/L — SIGNIFICANT CHANGE UP (ref 3.5–5.3)
POTASSIUM SERPL-SCNC: 3.7 MMOL/L — SIGNIFICANT CHANGE UP (ref 3.5–5.3)
RBC # BLD: 2.92 M/UL — LOW (ref 3.8–5.2)
RBC # FLD: 17.4 % — HIGH (ref 10.3–14.5)
SODIUM SERPL-SCNC: 149 MMOL/L — HIGH (ref 135–145)
WBC # BLD: 8.14 K/UL — SIGNIFICANT CHANGE UP (ref 3.8–10.5)
WBC # FLD AUTO: 8.14 K/UL — SIGNIFICANT CHANGE UP (ref 3.8–10.5)

## 2019-01-25 PROCEDURE — 99291 CRITICAL CARE FIRST HOUR: CPT

## 2019-01-25 PROCEDURE — 99232 SBSQ HOSP IP/OBS MODERATE 35: CPT

## 2019-01-25 RX ORDER — POLYETHYLENE GLYCOL 3350 17 G/17G
17 POWDER, FOR SOLUTION ORAL DAILY
Qty: 0 | Refills: 0 | Status: DISCONTINUED | OUTPATIENT
Start: 2019-01-26 | End: 2019-02-01

## 2019-01-25 RX ORDER — SENNA PLUS 8.6 MG/1
10 TABLET ORAL AT BEDTIME
Qty: 0 | Refills: 0 | Status: DISCONTINUED | OUTPATIENT
Start: 2019-01-25 | End: 2019-01-29

## 2019-01-25 RX ORDER — OXYCODONE HYDROCHLORIDE 5 MG/1
15 TABLET ORAL EVERY 4 HOURS
Qty: 0 | Refills: 0 | Status: DISCONTINUED | OUTPATIENT
Start: 2019-01-25 | End: 2019-01-27

## 2019-01-25 RX ORDER — AMLODIPINE BESYLATE 2.5 MG/1
10 TABLET ORAL ONCE
Qty: 0 | Refills: 0 | Status: COMPLETED | OUTPATIENT
Start: 2019-01-25 | End: 2019-01-25

## 2019-01-25 RX ORDER — OXYCODONE HYDROCHLORIDE 5 MG/1
10 TABLET ORAL
Qty: 0 | Refills: 0 | Status: DISCONTINUED | OUTPATIENT
Start: 2019-01-27 | End: 2019-01-30

## 2019-01-25 RX ORDER — OXYCODONE HYDROCHLORIDE 5 MG/1
15 TABLET ORAL
Qty: 0 | Refills: 0 | Status: DISCONTINUED | OUTPATIENT
Start: 2019-01-27 | End: 2019-01-30

## 2019-01-25 RX ORDER — LIDOCAINE HCL 20 MG/ML
10 VIAL (ML) INJECTION ONCE
Qty: 0 | Refills: 0 | Status: DISCONTINUED | OUTPATIENT
Start: 2019-01-25 | End: 2019-01-25

## 2019-01-25 RX ORDER — ACETAMINOPHEN 500 MG
1000 TABLET ORAL EVERY 6 HOURS
Qty: 0 | Refills: 0 | Status: COMPLETED | OUTPATIENT
Start: 2019-01-25 | End: 2019-01-27

## 2019-01-25 RX ORDER — DOCUSATE SODIUM 100 MG
100 CAPSULE ORAL
Qty: 0 | Refills: 0 | Status: DISCONTINUED | OUTPATIENT
Start: 2019-01-25 | End: 2019-01-29

## 2019-01-25 RX ORDER — POTASSIUM PHOSPHATE, MONOBASIC POTASSIUM PHOSPHATE, DIBASIC 236; 224 MG/ML; MG/ML
15 INJECTION, SOLUTION INTRAVENOUS ONCE
Qty: 0 | Refills: 0 | Status: COMPLETED | OUTPATIENT
Start: 2019-01-25 | End: 2019-01-25

## 2019-01-25 RX ORDER — HYDROMORPHONE HYDROCHLORIDE 2 MG/ML
1 INJECTION INTRAMUSCULAR; INTRAVENOUS; SUBCUTANEOUS EVERY 4 HOURS
Qty: 0 | Refills: 0 | Status: DISCONTINUED | OUTPATIENT
Start: 2019-01-25 | End: 2019-02-01

## 2019-01-25 RX ADMIN — ALBUTEROL 2.5 MILLIGRAM(S): 90 AEROSOL, METERED ORAL at 04:01

## 2019-01-25 RX ADMIN — HYDROMORPHONE HYDROCHLORIDE 30 MILLILITER(S): 2 INJECTION INTRAMUSCULAR; INTRAVENOUS; SUBCUTANEOUS at 07:46

## 2019-01-25 RX ADMIN — CHLORHEXIDINE GLUCONATE 1 APPLICATION(S): 213 SOLUTION TOPICAL at 09:51

## 2019-01-25 RX ADMIN — OXYCODONE HYDROCHLORIDE 15 MILLIGRAM(S): 5 TABLET ORAL at 22:34

## 2019-01-25 RX ADMIN — Medication 10 UNIT(S): at 17:59

## 2019-01-25 RX ADMIN — GABAPENTIN 400 MILLIGRAM(S): 400 CAPSULE ORAL at 14:07

## 2019-01-25 RX ADMIN — Medication 25 MILLIGRAM(S): at 18:00

## 2019-01-25 RX ADMIN — AMLODIPINE BESYLATE 10 MILLIGRAM(S): 2.5 TABLET ORAL at 12:41

## 2019-01-25 RX ADMIN — OXYCODONE HYDROCHLORIDE 15 MILLIGRAM(S): 5 TABLET ORAL at 10:52

## 2019-01-25 RX ADMIN — OXYCODONE HYDROCHLORIDE 15 MILLIGRAM(S): 5 TABLET ORAL at 22:40

## 2019-01-25 RX ADMIN — ALBUTEROL 2.5 MILLIGRAM(S): 90 AEROSOL, METERED ORAL at 22:45

## 2019-01-25 RX ADMIN — Medication 1 APPLICATION(S): at 12:26

## 2019-01-25 RX ADMIN — Medication 1000 MILLIGRAM(S): at 11:41

## 2019-01-25 RX ADMIN — GABAPENTIN 400 MILLIGRAM(S): 400 CAPSULE ORAL at 09:18

## 2019-01-25 RX ADMIN — Medication 250 MILLIGRAM(S): at 06:33

## 2019-01-25 RX ADMIN — Medication 1000 MILLIGRAM(S): at 11:51

## 2019-01-25 RX ADMIN — OXYCODONE HYDROCHLORIDE 15 MILLIGRAM(S): 5 TABLET ORAL at 15:07

## 2019-01-25 RX ADMIN — OXYCODONE HYDROCHLORIDE 15 MILLIGRAM(S): 5 TABLET ORAL at 19:01

## 2019-01-25 RX ADMIN — GABAPENTIN 400 MILLIGRAM(S): 400 CAPSULE ORAL at 23:50

## 2019-01-25 RX ADMIN — SODIUM CHLORIDE 3 MILLILITER(S): 9 INJECTION INTRAMUSCULAR; INTRAVENOUS; SUBCUTANEOUS at 22:48

## 2019-01-25 RX ADMIN — BUDESONIDE AND FORMOTEROL FUMARATE DIHYDRATE 2 PUFF(S): 160; 4.5 AEROSOL RESPIRATORY (INHALATION) at 09:38

## 2019-01-25 RX ADMIN — Medication 25 MILLIGRAM(S): at 12:20

## 2019-01-25 RX ADMIN — BUDESONIDE AND FORMOTEROL FUMARATE DIHYDRATE 2 PUFF(S): 160; 4.5 AEROSOL RESPIRATORY (INHALATION) at 22:59

## 2019-01-25 RX ADMIN — ALBUTEROL 2.5 MILLIGRAM(S): 90 AEROSOL, METERED ORAL at 09:40

## 2019-01-25 RX ADMIN — Medication 250 MILLIGRAM(S): at 19:30

## 2019-01-25 RX ADMIN — OXYCODONE HYDROCHLORIDE 15 MILLIGRAM(S): 5 TABLET ORAL at 14:07

## 2019-01-25 RX ADMIN — Medication 40 MILLIGRAM(S): at 06:34

## 2019-01-25 RX ADMIN — Medication 2: at 17:59

## 2019-01-25 RX ADMIN — SIMVASTATIN 20 MILLIGRAM(S): 20 TABLET, FILM COATED ORAL at 22:34

## 2019-01-25 RX ADMIN — Medication 10 MILLIGRAM(S): at 06:34

## 2019-01-25 RX ADMIN — Medication 10 UNIT(S): at 09:53

## 2019-01-25 RX ADMIN — CHLORHEXIDINE GLUCONATE 15 MILLILITER(S): 213 SOLUTION TOPICAL at 18:00

## 2019-01-25 RX ADMIN — ENOXAPARIN SODIUM 40 MILLIGRAM(S): 100 INJECTION SUBCUTANEOUS at 23:51

## 2019-01-25 RX ADMIN — SODIUM CHLORIDE 3 MILLILITER(S): 9 INJECTION INTRAMUSCULAR; INTRAVENOUS; SUBCUTANEOUS at 09:40

## 2019-01-25 RX ADMIN — Medication 137 MICROGRAM(S): at 06:34

## 2019-01-25 RX ADMIN — INSULIN GLARGINE 22 UNIT(S): 100 INJECTION, SOLUTION SUBCUTANEOUS at 23:51

## 2019-01-25 RX ADMIN — OXYCODONE HYDROCHLORIDE 15 MILLIGRAM(S): 5 TABLET ORAL at 18:00

## 2019-01-25 RX ADMIN — POTASSIUM PHOSPHATE, MONOBASIC POTASSIUM PHOSPHATE, DIBASIC 62.5 MILLIMOLE(S): 236; 224 INJECTION, SOLUTION INTRAVENOUS at 06:53

## 2019-01-25 RX ADMIN — OXYCODONE HYDROCHLORIDE 15 MILLIGRAM(S): 5 TABLET ORAL at 10:26

## 2019-01-25 RX ADMIN — PANTOPRAZOLE SODIUM 40 MILLIGRAM(S): 20 TABLET, DELAYED RELEASE ORAL at 11:41

## 2019-01-25 NOTE — PROGRESS NOTE ADULT - SUBJECTIVE AND OBJECTIVE BOX
INTERVAL HPI/OVERNIGHT EVENTS:    SUBJECTIVE: Patient seen and examined at bedside.     OBJECTIVE:    VITAL SIGNS:  ICU Vital Signs Last 24 Hrs  T(C): 37.4 (25 Jan 2019 05:00), Max: 37.7 (24 Jan 2019 08:00)  T(F): 99.4 (25 Jan 2019 05:00), Max: 99.8 (24 Jan 2019 08:00)  HR: 106 (25 Jan 2019 06:00) (96 - 121)  BP: 116/65 (25 Jan 2019 06:00) (96/57 - 174/80)  BP(mean): 77 (25 Jan 2019 06:00) (67 - 130)  ABP: --  ABP(mean): --  RR: 19 (25 Jan 2019 06:00) (16 - 27)  SpO2: 90% (25 Jan 2019 06:00) (85% - 100%)    Mode: NIV (Noninvasive Ventilation), RR (machine): 18, TV (machine): 400, FiO2: 70, PEEP: 5, ITime: 0.9, PIP: 13    01-24 @ 07:01  -  01-25 @ 07:00  --------------------------------------------------------  IN: 250 mL / OUT: 900 mL / NET: -650 mL      CAPILLARY BLOOD GLUCOSE      POCT Blood Glucose.: 123 mg/dL (25 Jan 2019 06:51)      PHYSICAL EXAM:    General: NAD  HEENT: NC/AT; PERRL, clear conjunctiva  Neck: supple  Respiratory: CTA b/l  Cardiovascular: +S1/S2; RRR  Abdomen: soft, NT/ND; +BS x4  Extremities: WWP, 2+ peripheral pulses b/l; no LE edema  Skin: normal color and turgor; no rash  Neurological:    MEDICATIONS:  MEDICATIONS  (STANDING):  acetaminophen  IVPB .. 1000 milliGRAM(s) IV Intermittent once  ALBUTerol    0.083% 2.5 milliGRAM(s) Nebulizer every 6 hours  amLODIPine   Tablet 10 milliGRAM(s) Oral daily  buDESOnide 160 MICROgram(s)/formoterol 4.5 MICROgram(s) Inhaler 2 Puff(s) Inhalation two times a day  chlorhexidine 0.12% Liquid 15 milliLiter(s) Swish and Spit two times a day  chlorhexidine 4% Liquid 1 Application(s) Topical <User Schedule>  collagenase Ointment 1 Application(s) Topical daily  enoxaparin Injectable 40 milliGRAM(s) SubCutaneous two times a day  furosemide   Injectable 40 milliGRAM(s) IV Push daily  gabapentin   Solution 400 milliGRAM(s) Oral three times a day  HYDROmorphone PCA (1 mG/mL) 30 milliLiter(s) PCA Continuous PCA Continuous  influenza   Vaccine 0.5 milliLiter(s) IntraMuscular once  insulin glargine Injectable (LANTUS) 22 Unit(s) SubCutaneous at bedtime  insulin lispro (HumaLOG) corrective regimen sliding scale   SubCutaneous every 8 hours  insulin lispro Injectable (HumaLOG) 10 Unit(s) SubCutaneous every 8 hours  levothyroxine 137 MICROGram(s) Oral daily  metoprolol tartrate 25 milliGRAM(s) Enteral Tube two times a day  pantoprazole   Suspension 40 milliGRAM(s) Oral daily  predniSONE   Tablet 10 milliGRAM(s) Oral daily  simvastatin 20 milliGRAM(s) Oral at bedtime  sodium chloride 3%  Inhalation 3 milliLiter(s) Inhalation two times a day  vancomycin  IVPB 750 milliGRAM(s) IV Intermittent every 12 hours    MEDICATIONS  (PRN):  HYDROmorphone PCA (1 mG/mL) Rescue Clinician Bolus 0.5 milliGRAM(s) IV Push every 15 minutes PRN for Pain Scale GREATER THAN 6  naloxone Injectable 0.1 milliGRAM(s) IV Push every 3 minutes PRN For ANY of the following changes in patient status:  A. RR LESS THAN 10 breaths per minute, B. Oxygen saturation LESS THAN 90%, C. Sedation score of 6  ondansetron Injectable 4 milliGRAM(s) IV Push every 6 hours PRN Nausea      ALLERGIES:  Allergies    Allergy Status Unknown    Intolerances    Seroquel (Other)      LABS:                        8.9    8.14  )-----------( 382      ( 25 Jan 2019 04:50 )             29.6     Hemoglobin: 8.9 g/dL (01-25 @ 04:50)  Hemoglobin: 8.9 g/dL (01-24 @ 04:00)  Hemoglobin: 8.7 g/dL (01-23 @ 05:40)  Hemoglobin: 8.5 g/dL (01-22 @ 02:40)  Hemoglobin: 8.9 g/dL (01-21 @ 03:00)    CBC Full  -  ( 25 Jan 2019 04:50 )  WBC Count : 8.14 K/uL  Hemoglobin : 8.9 g/dL  Hematocrit : 29.6 %  Platelet Count - Automated : 382 K/uL  Mean Cell Volume : 101.4 fL  Mean Cell Hemoglobin : 30.5 pg  Mean Cell Hemoglobin Concentration : 30.1 %  Auto Neutrophil # : x  Auto Lymphocyte # : x  Auto Monocyte # : x  Auto Eosinophil # : x  Auto Basophil # : x  Auto Neutrophil % : x  Auto Lymphocyte % : x  Auto Monocyte % : x  Auto Eosinophil % : x  Auto Basophil % : x    01-25    149<H>  |  107  |  13  ----------------------------<  111<H>  3.7   |  30  |  0.73    Ca    8.8      25 Jan 2019 04:50  Phos  2.4     01-25  Mg     2.1     01-25    TPro  6.5  /  Alb  3.2<L>  /  TBili  0.2  /  DBili  x   /  AST  21  /  ALT  20  /  AlkPhos  111  01-24    Creatinine Trend: 0.73<--, 0.82<--, 0.71<--, 0.65<--, 0.68<--, 0.74<--  LIVER FUNCTIONS - ( 24 Jan 2019 04:00 )  Alb: 3.2 g/dL / Pro: 6.5 g/dL / ALK PHOS: 111 u/L / ALT: 20 u/L / AST: 21 u/L / GGT: x                             EKG:   MICROBIOLOGY:    IMAGING:    RADIOLOGY & ADDITIONAL TESTS: Reviewed. INTERVAL HPI/OVERNIGHT EVENTS: No overnight events. Pt satting well on high flow nasal cannula. Started bowel regimen.     SUBJECTIVE: Patient seen and examined at bedside.     OBJECTIVE:    VITAL SIGNS:  ICU Vital Signs Last 24 Hrs  T(C): 37.4 (25 Jan 2019 05:00), Max: 37.7 (24 Jan 2019 08:00)  T(F): 99.4 (25 Jan 2019 05:00), Max: 99.8 (24 Jan 2019 08:00)  HR: 106 (25 Jan 2019 06:00) (96 - 121)  BP: 116/65 (25 Jan 2019 06:00) (96/57 - 174/80)  BP(mean): 77 (25 Jan 2019 06:00) (67 - 130)  ABP: --  ABP(mean): --  RR: 19 (25 Jan 2019 06:00) (16 - 27)  SpO2: 90% (25 Jan 2019 06:00) (85% - 100%)    Mode: NIV (Noninvasive Ventilation), RR (machine): 18, TV (machine): 400, FiO2: 70, PEEP: 5, ITime: 0.9, PIP: 13    01-24 @ 07:01  -  01-25 @ 07:00  --------------------------------------------------------  IN: 250 mL / OUT: 900 mL / NET: -650 mL      CAPILLARY BLOOD GLUCOSE      POCT Blood Glucose.: 123 mg/dL (25 Jan 2019 06:51)      PHYSICAL EXAM:    General: NAD  HEENT: NC/AT; PERRL, clear conjunctiva  Neck: supple  Respiratory: CTA b/l  Cardiovascular: +S1/S2; RRR  Abdomen: soft, NT/ND; +BS x4  Extremities: WWP, 2+ peripheral pulses b/l; no LE edema  Skin: normal color and turgor; no rash  Neurological:    MEDICATIONS:  MEDICATIONS  (STANDING):  acetaminophen  IVPB .. 1000 milliGRAM(s) IV Intermittent once  ALBUTerol    0.083% 2.5 milliGRAM(s) Nebulizer every 6 hours  amLODIPine   Tablet 10 milliGRAM(s) Oral daily  buDESOnide 160 MICROgram(s)/formoterol 4.5 MICROgram(s) Inhaler 2 Puff(s) Inhalation two times a day  chlorhexidine 0.12% Liquid 15 milliLiter(s) Swish and Spit two times a day  chlorhexidine 4% Liquid 1 Application(s) Topical <User Schedule>  collagenase Ointment 1 Application(s) Topical daily  enoxaparin Injectable 40 milliGRAM(s) SubCutaneous two times a day  furosemide   Injectable 40 milliGRAM(s) IV Push daily  gabapentin   Solution 400 milliGRAM(s) Oral three times a day  HYDROmorphone PCA (1 mG/mL) 30 milliLiter(s) PCA Continuous PCA Continuous  influenza   Vaccine 0.5 milliLiter(s) IntraMuscular once  insulin glargine Injectable (LANTUS) 22 Unit(s) SubCutaneous at bedtime  insulin lispro (HumaLOG) corrective regimen sliding scale   SubCutaneous every 8 hours  insulin lispro Injectable (HumaLOG) 10 Unit(s) SubCutaneous every 8 hours  levothyroxine 137 MICROGram(s) Oral daily  metoprolol tartrate 25 milliGRAM(s) Enteral Tube two times a day  pantoprazole   Suspension 40 milliGRAM(s) Oral daily  predniSONE   Tablet 10 milliGRAM(s) Oral daily  simvastatin 20 milliGRAM(s) Oral at bedtime  sodium chloride 3%  Inhalation 3 milliLiter(s) Inhalation two times a day  vancomycin  IVPB 750 milliGRAM(s) IV Intermittent every 12 hours    MEDICATIONS  (PRN):  HYDROmorphone PCA (1 mG/mL) Rescue Clinician Bolus 0.5 milliGRAM(s) IV Push every 15 minutes PRN for Pain Scale GREATER THAN 6  naloxone Injectable 0.1 milliGRAM(s) IV Push every 3 minutes PRN For ANY of the following changes in patient status:  A. RR LESS THAN 10 breaths per minute, B. Oxygen saturation LESS THAN 90%, C. Sedation score of 6  ondansetron Injectable 4 milliGRAM(s) IV Push every 6 hours PRN Nausea      ALLERGIES:  Allergies    Allergy Status Unknown    Intolerances    Seroquel (Other)      LABS:                        8.9    8.14  )-----------( 382      ( 25 Jan 2019 04:50 )             29.6     Hemoglobin: 8.9 g/dL (01-25 @ 04:50)  Hemoglobin: 8.9 g/dL (01-24 @ 04:00)  Hemoglobin: 8.7 g/dL (01-23 @ 05:40)  Hemoglobin: 8.5 g/dL (01-22 @ 02:40)  Hemoglobin: 8.9 g/dL (01-21 @ 03:00)    CBC Full  -  ( 25 Jan 2019 04:50 )  WBC Count : 8.14 K/uL  Hemoglobin : 8.9 g/dL  Hematocrit : 29.6 %  Platelet Count - Automated : 382 K/uL  Mean Cell Volume : 101.4 fL  Mean Cell Hemoglobin : 30.5 pg  Mean Cell Hemoglobin Concentration : 30.1 %  Auto Neutrophil # : x  Auto Lymphocyte # : x  Auto Monocyte # : x  Auto Eosinophil # : x  Auto Basophil # : x  Auto Neutrophil % : x  Auto Lymphocyte % : x  Auto Monocyte % : x  Auto Eosinophil % : x  Auto Basophil % : x    01-25    149<H>  |  107  |  13  ----------------------------<  111<H>  3.7   |  30  |  0.73    Ca    8.8      25 Jan 2019 04:50  Phos  2.4     01-25  Mg     2.1     01-25    TPro  6.5  /  Alb  3.2<L>  /  TBili  0.2  /  DBili  x   /  AST  21  /  ALT  20  /  AlkPhos  111  01-24    Creatinine Trend: 0.73<--, 0.82<--, 0.71<--, 0.65<--, 0.68<--, 0.74<--  LIVER FUNCTIONS - ( 24 Jan 2019 04:00 )  Alb: 3.2 g/dL / Pro: 6.5 g/dL / ALK PHOS: 111 u/L / ALT: 20 u/L / AST: 21 u/L / GGT: x               RADIOLOGY & ADDITIONAL TESTS: Reviewed.

## 2019-01-25 NOTE — PROGRESS NOTE ADULT - ASSESSMENT
63 year old female with RA immunosuppressed from chronic steroids, underlying DM, presented on 12/15 with SBO.  S/p laparotomy and subsequent revision, now with respiratory failure and sepsis due to an enterococcal bacteremia.  Course/Infection complicated by underlying DM/ immuno suppression. High risk for complications.    1) Enterococcal bacteremia: Enterococcus in the blood is most often form a , abdominal, wound or line infection.   the patient has risk for all of the above.    Recent C ta/p without abscess but concern for midline wound infection  -Wound culture with mixed growth (including enterococcus but different species)    -Repeat blood culture without growth  -MATEO without vegetation per my discussion with ICU team    -Continue vancomycin through 1/31/2019  therapeutic drug monitoring of vanco through before 4th dose (goal about 15)    Weekly CBc, bmp, vanco through while on vanco    2) Abdominal Wound: retention sutures in place  Improved  No abscess on CT    Continue Wound Care    3) Immunosuppressed: due ot steroid use  Impairs wound healing    4) DM: glycemic control will help wound healing    ID service will sign off. Call the ID service with additional questions.

## 2019-01-25 NOTE — PROGRESS NOTE ADULT - ATTENDING COMMENTS
63 year old woman with COPD on home O2 and BiPAP, RA on steroids, admitted with SBO, underwent ex lap 12/15, wound dehiscence on 12/30 required repeat surgery, developed multifocal pneumonia and hypercapnic respiratory failure requiring re- intubation 1/6, s/p bronch 1/9/18, Enterococci bacteremia. Transferred to MICU for further management    - Did well overnight on BiPAP  - now on high flow titrate down FiO2 goal to keep sat over 88%  - Pain control as per pain team  - wound care as per surgery and wound care team  - tolerating tube feeds  - repeat cultures from 1/17 is negative last positive cultures are from 1/12  - MATEO negative for vegetations    - hemodynamically stable  - eligible for transfer to RCU    critical care time 40 minutes  case discussed with sons at bedside

## 2019-01-25 NOTE — PROGRESS NOTE ADULT - SUBJECTIVE AND OBJECTIVE BOX
Anesthesia Pain Management Service    SUBJECTIVE: Patient is doing well with IV PCA and no significant problems reported.    Pain Scale Score	At rest: _8__ 	With Activity: ___ 	[X ] Refer to charted pain scores    THERAPY:    [ ] IV PCA Morphine		[ ] 5 mg/mL	[ ] 1 mg/mL  [X ] IV PCA Hydromorphone	[ ] 5 mg/mL	[X ] 1 mg/mL  [ ] IV PCA Fentanyl		[ ] 50 micrograms/mL    Demand dose __0.2_ lockout __6_ (minutes) Continuous Rate _0__ Total: _7.7__  mg used (in past 24 hours)      MEDICATIONS  (STANDING):  acetaminophen    Suspension .. 1000 milliGRAM(s) Oral every 6 hours  ALBUTerol    0.083% 2.5 milliGRAM(s) Nebulizer every 6 hours  amLODIPine   Tablet 10 milliGRAM(s) Oral daily  buDESOnide 160 MICROgram(s)/formoterol 4.5 MICROgram(s) Inhaler 2 Puff(s) Inhalation two times a day  chlorhexidine 0.12% Liquid 15 milliLiter(s) Swish and Spit two times a day  chlorhexidine 4% Liquid 1 Application(s) Topical <User Schedule>  collagenase Ointment 1 Application(s) Topical daily  enoxaparin Injectable 40 milliGRAM(s) SubCutaneous two times a day  furosemide   Injectable 40 milliGRAM(s) IV Push daily  gabapentin   Solution 400 milliGRAM(s) Oral three times a day  influenza   Vaccine 0.5 milliLiter(s) IntraMuscular once  insulin glargine Injectable (LANTUS) 22 Unit(s) SubCutaneous at bedtime  insulin lispro (HumaLOG) corrective regimen sliding scale   SubCutaneous every 8 hours  insulin lispro Injectable (HumaLOG) 10 Unit(s) SubCutaneous every 8 hours  levothyroxine 137 MICROGram(s) Oral daily  metoprolol tartrate 25 milliGRAM(s) Enteral Tube two times a day  oxyCODONE    Solution 15 milliGRAM(s) Oral every 4 hours  pantoprazole   Suspension 40 milliGRAM(s) Oral daily  predniSONE   Tablet 10 milliGRAM(s) Oral daily  simvastatin 20 milliGRAM(s) Oral at bedtime  sodium chloride 3%  Inhalation 3 milliLiter(s) Inhalation two times a day  vancomycin  IVPB 750 milliGRAM(s) IV Intermittent every 12 hours    MEDICATIONS  (PRN):  HYDROmorphone  Injectable 1 milliGRAM(s) IV Push every 4 hours PRN Severe Pain (7 - 10)  naloxone Injectable 0.1 milliGRAM(s) IV Push every 3 minutes PRN For ANY of the following changes in patient status:  A. RR LESS THAN 10 breaths per minute, B. Oxygen saturation LESS THAN 90%, C. Sedation score of 6  ondansetron Injectable 4 milliGRAM(s) IV Push every 6 hours PRN Nausea      OBJECTIVE:    Sedation Score:	[ X] Alert	[ ] Drowsy 	[ ] Arousable	[ ] Asleep	[ ] Unresponsive    Side Effects:	[X ] None	[ ] Nausea	[ ] Vomiting	[ ] Pruritus  		[ ] Other:    Vital Signs Last 24 Hrs  T(C): 37.4 (25 Jan 2019 05:00), Max: 37.6 (24 Jan 2019 12:00)  T(F): 99.4 (25 Jan 2019 05:00), Max: 99.7 (24 Jan 2019 12:00)  HR: 115 (25 Jan 2019 08:26) (96 - 121)  BP: 116/65 (25 Jan 2019 06:00) (96/57 - 174/80)  BP(mean): 77 (25 Jan 2019 06:00) (67 - 130)  RR: 19 (25 Jan 2019 06:00) (17 - 27)  SpO2: 92% (25 Jan 2019 08:26) (85% - 100%)    ASSESSMENT/ PLAN    Therapy to  be:	[ ] Continue   [x ] Discontinued   [x ] Change to prn Analgesics    Documentation and Verification of current medications:   [X] Done	[ ] Not done, not elligible    Comments:  dc iv pca  standing oxycodone liquid 15 Q4 for 2 days  IV dilaudid breakthrough  tylenol rtc  Progress Note written now but Patient was seen earlier.

## 2019-01-25 NOTE — PROGRESS NOTE ADULT - ASSESSMENT
63y Female hx of COPD presented initially with SBO, underwent ex lap 12/15 closed with 4 retention sutures, evicerated on 12/30 and closed with 6 more retention sutures, c/b multifocal pneumonia, developed hypercapnic respiratory failure requiring intubation 1/6, s/p bronch 1/9/18, improving and tolerating CPAP, still intubated, course further c/b Enterococci bacteremia. Transferred to MICU at request of patient/family.     Neuro:  - continue ativan 0.5 mg PO PRN for discomfort/ anxiety  - continuous PCA dilaudid D/Rob on 1/24, still has PRN pump, pain following  - ct gabapentin to 400mg tid    Resp:  - Intubated 1/6, extubated to BIPAP 1/24  - C/w albuterol and Symbicort   - pulm following, will f/u recs     CV:   - Continue amlodipine 10mg  - Metoprolol 25mg po BID   - Lasix 40 mg IV QD    GI:  - s/p 2*ex-lap, 10 retention sutures in situ, gaping wound covered in fibrinous exudate   - santyl to wound daily  - c/w protonix daily, home med  - bolus feeds    Renal:  - BMP and I&O monitoring as routine, stable BMP   - iraheta removed, patient spontaneously voiding   - Repletion of electrolytes PRN    Heme:  - H/H stable   - VTE ppx w/ Lovenox and IPCs--increasing lovenox to BID in line with weight based dosing     ID:  - blood cultures 1/12 growing Enterococcus faecium, repeat blood culture 1/17 NGTD  - lines: iraheta removed, rt IJV (1/6) changed to lt IJV 1/13,  ET 1/6  - Source of bacteremia not identified may be 2/2 abdominal wound (no abscess on CT) or other. ID following and recommend considering MATEO and continuance of Vancomycin, will plan to resume abx  - c/w vancomycin 1g Q12hrs -- ID recs 2 week course (will D/C on 1/31)- Supratherapeutic vanc level, rechecking at 1630: if <20 will start 750mg Q12, if >20 will check level in morning.     Endo:  - hx RA on chronic prednisone, DM, hypothyroid  - Hydrocortisone changed to home Prednisone dose 10mg Po ngt  - Synthroid 137mcg od through ngt on empty stomach    - Lantus 26 units q hs and Humalog 10units TID AC (prior to bolus feeds) along with low dose corrective scale to cover, monitor sugar levels and adjust insulin accordingly     Lines:  -left IJ central line  -amaury feeding tube  -ET 63y Female hx of COPD presented initially with SBO, underwent ex lap 12/15 closed with 4 retention sutures, evicerated on 12/30 and closed with 6 more retention sutures, c/b multifocal pneumonia, developed hypercapnic respiratory failure requiring intubation 1/6, s/p bronch 1/9/18, improving and tolerating CPAP, still intubated, course further c/b Enterococci bacteremia. Transferred to MICU at request of patient/family.     Neuro:  - continue ativan 0.5 mg PO PRN for discomfort/ anxiety  - ct gabapentin to 400mg tid  - dilaudid PCA D/Rob today. Now on PRN regimen of oxycodone and dilaudid     Resp:  - Intubated 1/6, extubated to BIPAP 1/24, now on high flow nasal cannula during the day, tolerating well  - C/w albuterol and Symbicort   - pulm following, will f/u recs     CV:   - Continue amlodipine 10mg  - Metoprolol 25mg po BID   - Lasix 40 mg IV QD    GI:  - s/p 2*ex-lap, 10 retention sutures in situ, gaping wound covered in fibrinous exudate   - santyl to wound daily  - c/w protonix daily, home med  - bolus feeds  - started bowel regimen today 1/25    Renal:  - BMP and I&O monitoring as routine, stable BMP   - iraheta removed, patient spontaneously voiding   - Repletion of electrolytes PRN    Heme:  - H/H stable   - VTE ppx w/ Lovenox and IPCs--increasing lovenox to BID in line with weight based dosing     ID:  - blood cultures 1/12 growing Enterococcus faecium, repeat blood culture 1/17 NGTD  - lines: iraheta removed, rt IJV (1/6) changed to lt IJV 1/13,  ET 1/6  - Source of bacteremia not identified may be 2/2 abdominal wound (no abscess on CT) or other. ID following and recommend considering MATEO and continuance of Vancomycin, will plan to resume abx. MATEO negative   - c/w vancomycin 750mg Q12hrs -- ID recs 2 week course (will D/C on 1/31)    Endo:  - hx RA on chronic prednisone, DM, hypothyroid  - Hydrocortisone changed to home Prednisone dose 10mg Po ngt  - Synthroid 137mcg od through ngt on empty stomach    - Lantus 26 units q hs and Humalog 10units TID AC (prior to bolus feeds) along with low dose corrective scale to cover, monitor sugar levels and adjust insulin accordingly     Lines:  -left IJ central line  -amaury feeding tube  -ET

## 2019-01-25 NOTE — CHART NOTE - NSCHARTNOTEFT_GEN_A_CORE
MICU Transfer Note    Transfer from: MICU  Transfer to:  (  ) Medicine    (  ) Telemetry    ( X ) RCU    (  ) Palliative    (  ) Stroke Unit    (  ) _______________  Accepting physican:      MICU COURSE:      64 y/o female with PMH of HTN, DM, hypothyroidism, RA (10mg prednisone), pulmonary HTN, CHRIS, and COPD (3L at home) who was originally admitted on 12/15 for SBO. Underwent ex lap wit lysis of adhesions on 12/16 after which time she was transferred to the SICU for delayed extubation. Was transferred to the floors after extubation when on 12/30 she eviscerated 2/2 a coughing episode, went her second ex lap and again returned to the SICU for delayed extubation. Of note, 10 to 12 retention sutures were placed over the course of the two surgeries. Was then transferred to RCU for multifocal pneumonia where she was placed on BIPAP. There developed ARDS and returned to the SICU where she was re-intubated on 1/6. She has since failed multiple attempts at extubation and both the pt and family (Son, Rome, is healthcare proxy) are reportedly refusing trach. Antibiotics were discontinued yesterday, although ID is recommending continued therapy with vancomycin for the next two weeks due to prior blood cultures growing E faecium. The pt and her family are requesting transfer to outside hospital which was not feasible, so MICU was consulted for transfer.  63y Female hx of COPD presented initially with SBO, underwent ex lap 12/15 closed with 4 retention sutures, evicerated on 12/30 and closed with 6 more retention sutures, c/b multifocal pneumonia, developed hypercapnic respiratory failure requiring intubation 1/6, s/p bronch 1/9/18, improving and tolerating CPAP, still intubated, course further c/b Enterococci bacteremia. Transferred to MICU at request of patient/family on 1/22. Extubated to BIPAP on 1/23. Switched to high flow nasal cannula on 1/24. Dilaudid PCA pump discontinued on 1/25. Patient satting well on HF NS.     Assessment and plan:     Neuro:  - continue ativan 0.5 mg PO PRN for discomfort/ anxiety  - ct gabapentin to 400mg tid  - dilaudid PCA D/Rob today. Now on PRN regimen of oxycodone and dilaudid     Resp:  - Intubated 1/6, extubated to BIPAP 1/24, now on high flow nasal cannula during the day, tolerating well  - C/w albuterol and Symbicort   - pulm following, will f/u recs     CV:   - Continue amlodipine 10mg  - Metoprolol 25mg po BID   - Lasix 40 mg IV QD    GI:  - s/p 2*ex-lap, 10 retention sutures in situ, gaping wound covered in fibrinous exudate   - santyl to wound daily  - c/w protonix daily, home med  - bolus feeds  - started bowel regimen today 1/25    Renal:  - BMP and I&O monitoring as routine, stable BMP   - iraheta removed, patient spontaneously voiding   - Repletion of electrolytes PRN    Heme:  - H/H stable   - VTE ppx w/ Lovenox and IPCs--increasing lovenox to BID in line with weight based dosing     ID:  - blood cultures 1/12 growing Enterococcus faecium, repeat blood culture 1/17 NGTD  - lines: iraheta removed, rt IJV (1/6) changed to lt IJV 1/13,  ET 1/6  - Source of bacteremia not identified may be 2/2 abdominal wound (no abscess on CT) or other. ID following and recommend considering MATEO and continuance of Vancomycin, will plan to resume abx. MATEO negative   - c/w vancomycin 750mg Q12hrs -- ID recs 2 week course (will D/C on 1/31)    Endo:  - hx RA on chronic prednisone, DM, hypothyroid  - Hydrocortisone changed to home Prednisone dose 10mg Po ngt  - Synthroid 137mcg od through ngt on empty stomach    - Lantus 26 units q hs and Humalog 10units TID AC (prior to bolus feeds) along with low dose corrective scale to cover, monitor sugar levels and adjust insulin accordingly     Lines:  -left IJ central line  -amaury feeding tube  -f/u           For Follow-Up:  -closely monitor respiratory status  -complete vancomycin course  -      Vital Signs Last 24 Hrs  T(C): 37.4 (25 Jan 2019 05:00), Max: 37.6 (24 Jan 2019 12:00)  T(F): 99.4 (25 Jan 2019 05:00), Max: 99.7 (24 Jan 2019 12:00)  HR: 121 (25 Jan 2019 10:23) (96 - 121)  BP: 116/65 (25 Jan 2019 06:00) (96/57 - 174/80)  BP(mean): 77 (25 Jan 2019 06:00) (67 - 130)  RR: 23 (25 Jan 2019 10:23) (17 - 26)  SpO2: 93% (25 Jan 2019 10:23) (90% - 100%)  I&O's Summary    24 Jan 2019 07:01  -  25 Jan 2019 07:00  --------------------------------------------------------  IN: 250 mL / OUT: 900 mL / NET: -650 mL    25 Jan 2019 07:01  -  25 Jan 2019 10:53  --------------------------------------------------------  IN: 460 mL / OUT: 500 mL / NET: -40 mL          MEDICATIONS  (STANDING):  acetaminophen    Suspension .. 1000 milliGRAM(s) Oral every 6 hours  ALBUTerol    0.083% 2.5 milliGRAM(s) Nebulizer every 6 hours  amLODIPine   Tablet 10 milliGRAM(s) Oral daily  buDESOnide 160 MICROgram(s)/formoterol 4.5 MICROgram(s) Inhaler 2 Puff(s) Inhalation two times a day  chlorhexidine 0.12% Liquid 15 milliLiter(s) Swish and Spit two times a day  chlorhexidine 4% Liquid 1 Application(s) Topical <User Schedule>  collagenase Ointment 1 Application(s) Topical daily  docusate sodium Liquid 100 milliGRAM(s) Oral two times a day  enoxaparin Injectable 40 milliGRAM(s) SubCutaneous two times a day  furosemide   Injectable 40 milliGRAM(s) IV Push daily  gabapentin   Solution 400 milliGRAM(s) Oral three times a day  influenza   Vaccine 0.5 milliLiter(s) IntraMuscular once  insulin glargine Injectable (LANTUS) 22 Unit(s) SubCutaneous at bedtime  insulin lispro (HumaLOG) corrective regimen sliding scale   SubCutaneous every 8 hours  insulin lispro Injectable (HumaLOG) 10 Unit(s) SubCutaneous every 8 hours  levothyroxine 137 MICROGram(s) Oral daily  metoprolol tartrate 25 milliGRAM(s) Enteral Tube two times a day  oxyCODONE    Solution 15 milliGRAM(s) Oral every 4 hours  pantoprazole   Suspension 40 milliGRAM(s) Oral daily  predniSONE   Tablet 10 milliGRAM(s) Oral daily  senna Syrup 10 milliLiter(s) Oral at bedtime  simvastatin 20 milliGRAM(s) Oral at bedtime  sodium chloride 3%  Inhalation 3 milliLiter(s) Inhalation two times a day  vancomycin  IVPB 750 milliGRAM(s) IV Intermittent every 12 hours    MEDICATIONS  (PRN):  HYDROmorphone  Injectable 1 milliGRAM(s) IV Push every 4 hours PRN Severe Pain (7 - 10)  naloxone Injectable 0.1 milliGRAM(s) IV Push every 3 minutes PRN For ANY of the following changes in patient status:  A. RR LESS THAN 10 breaths per minute, B. Oxygen saturation LESS THAN 90%, C. Sedation score of 6        LABS                                            8.9                   Neurophils% (auto):   x      (01-25 @ 04:50):    8.14 )-----------(382          Lymphocytes% (auto):  x                                             29.6                   Eosinphils% (auto):   x        Manual%: Neutrophils x    ; Lymphocytes x    ; Eosinophils x    ; Bands%: x    ; Blasts x                                    149    |  107    |  13                  Calcium: 8.8   / iCa: x      (01-25 @ 04:50)    ----------------------------<  111       Magnesium: 2.1                              3.7     |  30     |  0.73             Phosphorous: 2.4 MICU Transfer Note    Transfer from: MICU  Transfer to:  (  ) Medicine    (  ) Telemetry    ( X ) RCU    (  ) Palliative    (  ) Stroke Unit    (  ) _______________  Accepting physican: RCU      MICU COURSE:      62 y/o female with PMH of HTN, DM, hypothyroidism, RA (10mg prednisone), pulmonary HTN, CHRIS, and COPD (3L at home) who was originally admitted on 12/15 for SBO. Underwent ex lap wit lysis of adhesions on 12/16 after which time she was transferred to the SICU for delayed extubation. Was transferred to the floors after extubation when on 12/30 she eviscerated 2/2 a coughing episode, went her second ex lap and again returned to the SICU for delayed extubation. Of note, 10 to 12 retention sutures were placed over the course of the two surgeries. Was then transferred to RCU for multifocal pneumonia where she was placed on BIPAP. There developed ARDS and returned to the SICU where she was re-intubated on 1/6. She has since failed multiple attempts at extubation and both the pt and family (Son, Rome, is healthcare proxy) are reportedly refusing trach. Antibiotics were discontinued yesterday, although ID is recommending continued therapy with vancomycin for the next two weeks due to prior blood cultures growing E faecium. The pt and her family are requesting transfer to outside hospital which was not feasible, so MICU was consulted for transfer.  63y Female hx of COPD presented initially with SBO, underwent ex lap 12/15 closed with 4 retention sutures, evicerated on 12/30 and closed with 6 more retention sutures, c/b multifocal pneumonia, developed hypercapnic respiratory failure requiring intubation 1/6, s/p bronch 1/9/18, improving and tolerating CPAP, still intubated, course further c/b Enterococci bacteremia. Transferred to MICU at request of patient/family on 1/22. Extubated to BIPAP on 1/23. Switched to high flow nasal cannula on 1/24. Dilaudid PCA pump discontinued on 1/25. Patient satting well on HF NS.     Assessment and plan:     Neuro:  - continue ativan 0.5 mg PO PRN for discomfort/ anxiety  - ct gabapentin to 400mg tid  - dilaudid PCA D/Rob today. Now on PRN regimen of oxycodone and dilaudid     Resp:  - Intubated 1/6, extubated to BIPAP 1/24, now on high flow nasal cannula during the day, tolerating well  - C/w albuterol and Symbicort   - pulm following, will f/u recs     CV:   - Continue amlodipine 10mg  - Metoprolol 25mg po BID   - Lasix 40 mg IV QD    GI:  - s/p 2*ex-lap, 10 retention sutures in situ, gaping wound covered in fibrinous exudate   - santyl to wound daily  - c/w protonix daily, home med  - bolus feeds  - started bowel regimen today 1/25    Renal:  - BMP and I&O monitoring as routine, stable BMP   - iraheat removed, patient spontaneously voiding   - Repletion of electrolytes PRN    Heme:  - H/H stable   - VTE ppx w/ Lovenox and IPCs--increasing lovenox to BID in line with weight based dosing     ID:  - blood cultures 1/12 growing Enterococcus faecium, repeat blood culture 1/17 NGTD  - lines: iraheta removed, rt IJV (1/6) changed to lt IJV 1/13,  ET 1/6  - Source of bacteremia not identified may be 2/2 abdominal wound (no abscess on CT) or other. ID following and recommend considering MATEO and continuance of Vancomycin, will plan to resume abx. AMTEO negative   - c/w vancomycin 750mg Q12hrs -- ID recs 2 week course (will D/C on 1/31)    Endo:  - hx RA on chronic prednisone, DM, hypothyroid  - Hydrocortisone changed to home Prednisone dose 10mg Po ngt  - Synthroid 137mcg od through ngt on empty stomach    - Lantus 26 units q hs and Humalog 10units TID AC (prior to bolus feeds) along with low dose corrective scale to cover, monitor sugar levels and adjust insulin accordingly     Lines:  -left IJ central line  -amaury feeding tube  -f/u           For Follow-Up:  -closely monitor respiratory status  -complete vancomycin course on 1/31  -f/u surgry, pulms, ID, pain recs      Vital Signs Last 24 Hrs  T(C): 37.4 (25 Jan 2019 05:00), Max: 37.6 (24 Jan 2019 12:00)  T(F): 99.4 (25 Jan 2019 05:00), Max: 99.7 (24 Jan 2019 12:00)  HR: 121 (25 Jan 2019 10:23) (96 - 121)  BP: 116/65 (25 Jan 2019 06:00) (96/57 - 174/80)  BP(mean): 77 (25 Jan 2019 06:00) (67 - 130)  RR: 23 (25 Jan 2019 10:23) (17 - 26)  SpO2: 93% (25 Jan 2019 10:23) (90% - 100%)  I&O's Summary    24 Jan 2019 07:01  -  25 Jan 2019 07:00  --------------------------------------------------------  IN: 250 mL / OUT: 900 mL / NET: -650 mL    25 Jan 2019 07:01  -  25 Jan 2019 10:53  --------------------------------------------------------  IN: 460 mL / OUT: 500 mL / NET: -40 mL          MEDICATIONS  (STANDING):  acetaminophen    Suspension .. 1000 milliGRAM(s) Oral every 6 hours  ALBUTerol    0.083% 2.5 milliGRAM(s) Nebulizer every 6 hours  amLODIPine   Tablet 10 milliGRAM(s) Oral daily  buDESOnide 160 MICROgram(s)/formoterol 4.5 MICROgram(s) Inhaler 2 Puff(s) Inhalation two times a day  chlorhexidine 0.12% Liquid 15 milliLiter(s) Swish and Spit two times a day  chlorhexidine 4% Liquid 1 Application(s) Topical <User Schedule>  collagenase Ointment 1 Application(s) Topical daily  docusate sodium Liquid 100 milliGRAM(s) Oral two times a day  enoxaparin Injectable 40 milliGRAM(s) SubCutaneous two times a day  furosemide   Injectable 40 milliGRAM(s) IV Push daily  gabapentin   Solution 400 milliGRAM(s) Oral three times a day  influenza   Vaccine 0.5 milliLiter(s) IntraMuscular once  insulin glargine Injectable (LANTUS) 22 Unit(s) SubCutaneous at bedtime  insulin lispro (HumaLOG) corrective regimen sliding scale   SubCutaneous every 8 hours  insulin lispro Injectable (HumaLOG) 10 Unit(s) SubCutaneous every 8 hours  levothyroxine 137 MICROGram(s) Oral daily  metoprolol tartrate 25 milliGRAM(s) Enteral Tube two times a day  oxyCODONE    Solution 15 milliGRAM(s) Oral every 4 hours  pantoprazole   Suspension 40 milliGRAM(s) Oral daily  predniSONE   Tablet 10 milliGRAM(s) Oral daily  senna Syrup 10 milliLiter(s) Oral at bedtime  simvastatin 20 milliGRAM(s) Oral at bedtime  sodium chloride 3%  Inhalation 3 milliLiter(s) Inhalation two times a day  vancomycin  IVPB 750 milliGRAM(s) IV Intermittent every 12 hours    MEDICATIONS  (PRN):  HYDROmorphone  Injectable 1 milliGRAM(s) IV Push every 4 hours PRN Severe Pain (7 - 10)  naloxone Injectable 0.1 milliGRAM(s) IV Push every 3 minutes PRN For ANY of the following changes in patient status:  A. RR LESS THAN 10 breaths per minute, B. Oxygen saturation LESS THAN 90%, C. Sedation score of 6        LABS                                            8.9                   Neurophils% (auto):   x      (01-25 @ 04:50):    8.14 )-----------(382          Lymphocytes% (auto):  x                                             29.6                   Eosinphils% (auto):   x        Manual%: Neutrophils x    ; Lymphocytes x    ; Eosinophils x    ; Bands%: x    ; Blasts x                                    149    |  107    |  13                  Calcium: 8.8   / iCa: x      (01-25 @ 04:50)    ----------------------------<  111       Magnesium: 2.1                              3.7     |  30     |  0.73             Phosphorous: 2.4

## 2019-01-25 NOTE — PROGRESS NOTE ADULT - SUBJECTIVE AND OBJECTIVE BOX
Follow Up:      Inverval History/ROS:Patient is a 63y old  Female who presents with a chief complaint of abd pain (23 Jan 2019 10:44)  On high flow oxygen.    Respiratory status feels better.   no fever  Abd pain stable.      Allergies    Allergy Status Unknown    Intolerances    Seroquel (Other)      ANTIMICROBIALS:  vancomycin  IVPB 750 every 12 hours      OTHER MEDS:  acetaminophen    Suspension .. 1000 milliGRAM(s) Oral every 6 hours  ALBUTerol    0.083% 2.5 milliGRAM(s) Nebulizer every 6 hours  amLODIPine   Tablet 10 milliGRAM(s) Oral daily  buDESOnide 160 MICROgram(s)/formoterol 4.5 MICROgram(s) Inhaler 2 Puff(s) Inhalation two times a day  chlorhexidine 0.12% Liquid 15 milliLiter(s) Swish and Spit two times a day  chlorhexidine 4% Liquid 1 Application(s) Topical <User Schedule>  collagenase Ointment 1 Application(s) Topical daily  docusate sodium Liquid 100 milliGRAM(s) Oral two times a day  enoxaparin Injectable 40 milliGRAM(s) SubCutaneous two times a day  furosemide   Injectable 40 milliGRAM(s) IV Push daily  gabapentin   Solution 400 milliGRAM(s) Oral three times a day  HYDROmorphone  Injectable 1 milliGRAM(s) IV Push every 4 hours PRN  influenza   Vaccine 0.5 milliLiter(s) IntraMuscular once  insulin glargine Injectable (LANTUS) 22 Unit(s) SubCutaneous at bedtime  insulin lispro (HumaLOG) corrective regimen sliding scale   SubCutaneous every 8 hours  insulin lispro Injectable (HumaLOG) 10 Unit(s) SubCutaneous every 8 hours  levothyroxine 137 MICROGram(s) Oral daily  metoprolol tartrate 25 milliGRAM(s) Enteral Tube two times a day  naloxone Injectable 0.1 milliGRAM(s) IV Push every 3 minutes PRN  oxyCODONE    Solution 15 milliGRAM(s) Oral every 4 hours  pantoprazole   Suspension 40 milliGRAM(s) Oral daily  predniSONE   Tablet 10 milliGRAM(s) Oral daily  senna Syrup 10 milliLiter(s) Oral at bedtime  simvastatin 20 milliGRAM(s) Oral at bedtime  sodium chloride 3%  Inhalation 3 milliLiter(s) Inhalation two times a day      Vital Signs Last 24 Hrs  T(C): 37.3 (25 Jan 2019 12:00), Max: 37.6 (24 Jan 2019 16:00)  T(F): 99.2 (25 Jan 2019 12:00), Max: 99.6 (24 Jan 2019 16:00)  HR: 118 (25 Jan 2019 12:04) (96 - 123)  BP: 122/72 (25 Jan 2019 12:00) (96/57 - 174/80)  BP(mean): 80 (25 Jan 2019 12:00) (67 - 130)  RR: 23 (25 Jan 2019 12:04) (17 - 26)  SpO2: 90% (25 Jan 2019 12:04) (87% - 100%)    PHYSICAL EXAM:  General: [x ] non-toxic  HEAD/EYES: [ ] PERRL [x ] white sclera [ ] icterus  ENT:  [ ] normal [ ] supple [ ] thrush [ ] pharyngeal exudate  Cardiovascular:   [ ] murmur [x ] normal [ ] PPM/AICD  Respiratory:  [x ] clear to ausculation bilaterally  GI:  [ x] soft, non-tender, normal bowel sounds  midline incison: retention sutures, less draiange    :  [ ] iraheta [ ] no CVA tenderness   Musculoskeletal:  [ ] no synovitis  Neurologic:  [x ] non-focal exam   Skin:  [x ] no rash  Lymph: [x ] no lymphadenopathy  Psychiatric:  [x ] appropriate affect [ ] alert & oriented  Lines:  [x ] no phlebitis [ ] central line                                8.9    8.14  )-----------( 382      ( 25 Jan 2019 04:50 )             29.6       01-25    149<H>  |  107  |  13  ----------------------------<  111<H>  3.7   |  30  |  0.73    Ca    8.8      25 Jan 2019 04:50  Phos  2.4     01-25  Mg     2.1     01-25    TPro  6.5  /  Alb  3.2<L>  /  TBili  0.2  /  DBili  x   /  AST  21  /  ALT  20  /  AlkPhos  111  01-24          MICROBIOLOGY:    RADIOLOGY:

## 2019-01-25 NOTE — PROGRESS NOTE ADULT - SUBJECTIVE AND OBJECTIVE BOX
Anesthesia Pain Management Service- Attending Addendum    SUBJECTIVE: Patient's pain control adequate    Therapy:	  [ X] IV PCA	   [ ] Epidural           [ ] s/p Spinal Opoid              [ ] Postpartum infusion	  [ ] Patient controlled regional anesthesia (PCRA)    [ ] prn Analgesics    Allergies    Allergy Status Unknown    Intolerances    Seroquel (Other)    MEDICATIONS  (STANDING):  acetaminophen    Suspension .. 1000 milliGRAM(s) Oral every 6 hours  ALBUTerol    0.083% 2.5 milliGRAM(s) Nebulizer every 6 hours  amLODIPine   Tablet 10 milliGRAM(s) Oral daily  buDESOnide 160 MICROgram(s)/formoterol 4.5 MICROgram(s) Inhaler 2 Puff(s) Inhalation two times a day  chlorhexidine 0.12% Liquid 15 milliLiter(s) Swish and Spit two times a day  chlorhexidine 4% Liquid 1 Application(s) Topical <User Schedule>  collagenase Ointment 1 Application(s) Topical daily  docusate sodium Liquid 100 milliGRAM(s) Oral two times a day  enoxaparin Injectable 40 milliGRAM(s) SubCutaneous two times a day  furosemide   Injectable 40 milliGRAM(s) IV Push daily  gabapentin   Solution 400 milliGRAM(s) Oral three times a day  influenza   Vaccine 0.5 milliLiter(s) IntraMuscular once  insulin glargine Injectable (LANTUS) 22 Unit(s) SubCutaneous at bedtime  insulin lispro (HumaLOG) corrective regimen sliding scale   SubCutaneous every 8 hours  insulin lispro Injectable (HumaLOG) 10 Unit(s) SubCutaneous every 8 hours  levothyroxine 137 MICROGram(s) Oral daily  metoprolol tartrate 25 milliGRAM(s) Enteral Tube two times a day  oxyCODONE    Solution 15 milliGRAM(s) Oral every 4 hours  pantoprazole   Suspension 40 milliGRAM(s) Oral daily  predniSONE   Tablet 10 milliGRAM(s) Oral daily  senna Syrup 10 milliLiter(s) Oral at bedtime  simvastatin 20 milliGRAM(s) Oral at bedtime  sodium chloride 3%  Inhalation 3 milliLiter(s) Inhalation two times a day  vancomycin  IVPB 750 milliGRAM(s) IV Intermittent every 12 hours    MEDICATIONS  (PRN):  HYDROmorphone  Injectable 1 milliGRAM(s) IV Push every 4 hours PRN Severe Pain (7 - 10)  naloxone Injectable 0.1 milliGRAM(s) IV Push every 3 minutes PRN For ANY of the following changes in patient status:  A. RR LESS THAN 10 breaths per minute, B. Oxygen saturation LESS THAN 90%, C. Sedation score of 6      OBJECTIVE:   [X] No new signs     [ ] Other:    Side Effects:  [X ] None			[ ] Other:      ASSESSMENT/PLAN  -Discontinue current therapy    [ ] Therapy changed to:    [ ] IV PCA       [ ] Epidural     [ X] prn Analgesics     Comments: Pain management per primary team, APS to sign off

## 2019-01-26 DIAGNOSIS — Z29.9 ENCOUNTER FOR PROPHYLACTIC MEASURES, UNSPECIFIED: ICD-10-CM

## 2019-01-26 DIAGNOSIS — R06.02 SHORTNESS OF BREATH: ICD-10-CM

## 2019-01-26 DIAGNOSIS — E66.9 OBESITY, UNSPECIFIED: ICD-10-CM

## 2019-01-26 DIAGNOSIS — J45.909 UNSPECIFIED ASTHMA, UNCOMPLICATED: ICD-10-CM

## 2019-01-26 DIAGNOSIS — G47.33 OBSTRUCTIVE SLEEP APNEA (ADULT) (PEDIATRIC): ICD-10-CM

## 2019-01-26 DIAGNOSIS — R78.81 BACTEREMIA: ICD-10-CM

## 2019-01-26 LAB
ANION GAP SERPL CALC-SCNC: 14 MMO/L — SIGNIFICANT CHANGE UP (ref 7–14)
BUN SERPL-MCNC: 18 MG/DL — SIGNIFICANT CHANGE UP (ref 7–23)
CALCIUM SERPL-MCNC: 9.7 MG/DL — SIGNIFICANT CHANGE UP (ref 8.4–10.5)
CHLORIDE SERPL-SCNC: 101 MMOL/L — SIGNIFICANT CHANGE UP (ref 98–107)
CO2 SERPL-SCNC: 29 MMOL/L — SIGNIFICANT CHANGE UP (ref 22–31)
CREAT SERPL-MCNC: 0.88 MG/DL — SIGNIFICANT CHANGE UP (ref 0.5–1.3)
GLUCOSE BLDC GLUCOMTR-MCNC: 136 MG/DL — HIGH (ref 70–99)
GLUCOSE BLDC GLUCOMTR-MCNC: 138 MG/DL — HIGH (ref 70–99)
GLUCOSE BLDC GLUCOMTR-MCNC: 194 MG/DL — HIGH (ref 70–99)
GLUCOSE BLDC GLUCOMTR-MCNC: 290 MG/DL — HIGH (ref 70–99)
GLUCOSE SERPL-MCNC: 172 MG/DL — HIGH (ref 70–99)
HCT VFR BLD CALC: 34.6 % — SIGNIFICANT CHANGE UP (ref 34.5–45)
HGB BLD-MCNC: 9.9 G/DL — LOW (ref 11.5–15.5)
MCHC RBC-ENTMCNC: 28.6 % — LOW (ref 32–36)
MCHC RBC-ENTMCNC: 30 PG — SIGNIFICANT CHANGE UP (ref 27–34)
MCV RBC AUTO: 104.8 FL — HIGH (ref 80–100)
NRBC # FLD: 0.15 K/UL — LOW (ref 25–125)
NRBC FLD-RTO: 1.6 — SIGNIFICANT CHANGE UP
PLATELET # BLD AUTO: 465 K/UL — HIGH (ref 150–400)
PMV BLD: 9.7 FL — SIGNIFICANT CHANGE UP (ref 7–13)
POTASSIUM SERPL-MCNC: 4.5 MMOL/L — SIGNIFICANT CHANGE UP (ref 3.5–5.3)
POTASSIUM SERPL-SCNC: 4.5 MMOL/L — SIGNIFICANT CHANGE UP (ref 3.5–5.3)
RBC # BLD: 3.3 M/UL — LOW (ref 3.8–5.2)
RBC # FLD: 17.4 % — HIGH (ref 10.3–14.5)
SODIUM SERPL-SCNC: 144 MMOL/L — SIGNIFICANT CHANGE UP (ref 135–145)
VANCOMYCIN TROUGH SERPL-MCNC: 14.9 UG/ML — SIGNIFICANT CHANGE UP (ref 10–20)
WBC # BLD: 9.38 K/UL — SIGNIFICANT CHANGE UP (ref 3.8–10.5)
WBC # FLD AUTO: 9.38 K/UL — SIGNIFICANT CHANGE UP (ref 3.8–10.5)

## 2019-01-26 PROCEDURE — 71045 X-RAY EXAM CHEST 1 VIEW: CPT | Mod: 26

## 2019-01-26 PROCEDURE — 99232 SBSQ HOSP IP/OBS MODERATE 35: CPT

## 2019-01-26 RX ADMIN — HYDROMORPHONE HYDROCHLORIDE 1 MILLIGRAM(S): 2 INJECTION INTRAMUSCULAR; INTRAVENOUS; SUBCUTANEOUS at 04:57

## 2019-01-26 RX ADMIN — SODIUM CHLORIDE 3 MILLILITER(S): 9 INJECTION INTRAMUSCULAR; INTRAVENOUS; SUBCUTANEOUS at 22:38

## 2019-01-26 RX ADMIN — Medication 10 UNIT(S): at 10:02

## 2019-01-26 RX ADMIN — ALBUTEROL 2.5 MILLIGRAM(S): 90 AEROSOL, METERED ORAL at 15:49

## 2019-01-26 RX ADMIN — GABAPENTIN 400 MILLIGRAM(S): 400 CAPSULE ORAL at 22:00

## 2019-01-26 RX ADMIN — BUDESONIDE AND FORMOTEROL FUMARATE DIHYDRATE 2 PUFF(S): 160; 4.5 AEROSOL RESPIRATORY (INHALATION) at 22:39

## 2019-01-26 RX ADMIN — HYDROMORPHONE HYDROCHLORIDE 1 MILLIGRAM(S): 2 INJECTION INTRAMUSCULAR; INTRAVENOUS; SUBCUTANEOUS at 04:27

## 2019-01-26 RX ADMIN — Medication 25 MILLIGRAM(S): at 18:28

## 2019-01-26 RX ADMIN — OXYCODONE HYDROCHLORIDE 15 MILLIGRAM(S): 5 TABLET ORAL at 11:00

## 2019-01-26 RX ADMIN — OXYCODONE HYDROCHLORIDE 15 MILLIGRAM(S): 5 TABLET ORAL at 02:07

## 2019-01-26 RX ADMIN — HYDROMORPHONE HYDROCHLORIDE 1 MILLIGRAM(S): 2 INJECTION INTRAMUSCULAR; INTRAVENOUS; SUBCUTANEOUS at 15:15

## 2019-01-26 RX ADMIN — OXYCODONE HYDROCHLORIDE 15 MILLIGRAM(S): 5 TABLET ORAL at 22:01

## 2019-01-26 RX ADMIN — SIMVASTATIN 20 MILLIGRAM(S): 20 TABLET, FILM COATED ORAL at 22:00

## 2019-01-26 RX ADMIN — AMLODIPINE BESYLATE 10 MILLIGRAM(S): 2.5 TABLET ORAL at 18:26

## 2019-01-26 RX ADMIN — INSULIN GLARGINE 22 UNIT(S): 100 INJECTION, SOLUTION SUBCUTANEOUS at 21:59

## 2019-01-26 RX ADMIN — Medication 137 MICROGRAM(S): at 06:52

## 2019-01-26 RX ADMIN — CHLORHEXIDINE GLUCONATE 1 APPLICATION(S): 213 SOLUTION TOPICAL at 10:01

## 2019-01-26 RX ADMIN — Medication 100 MILLIGRAM(S): at 06:39

## 2019-01-26 RX ADMIN — Medication 10 UNIT(S): at 18:27

## 2019-01-26 RX ADMIN — HYDROMORPHONE HYDROCHLORIDE 1 MILLIGRAM(S): 2 INJECTION INTRAMUSCULAR; INTRAVENOUS; SUBCUTANEOUS at 14:59

## 2019-01-26 RX ADMIN — Medication 40 MILLIGRAM(S): at 06:40

## 2019-01-26 RX ADMIN — CHLORHEXIDINE GLUCONATE 15 MILLILITER(S): 213 SOLUTION TOPICAL at 06:37

## 2019-01-26 RX ADMIN — Medication 250 MILLIGRAM(S): at 07:28

## 2019-01-26 RX ADMIN — SODIUM CHLORIDE 3 MILLILITER(S): 9 INJECTION INTRAMUSCULAR; INTRAVENOUS; SUBCUTANEOUS at 10:16

## 2019-01-26 RX ADMIN — OXYCODONE HYDROCHLORIDE 15 MILLIGRAM(S): 5 TABLET ORAL at 18:26

## 2019-01-26 RX ADMIN — OXYCODONE HYDROCHLORIDE 15 MILLIGRAM(S): 5 TABLET ORAL at 07:09

## 2019-01-26 RX ADMIN — BUDESONIDE AND FORMOTEROL FUMARATE DIHYDRATE 2 PUFF(S): 160; 4.5 AEROSOL RESPIRATORY (INHALATION) at 10:16

## 2019-01-26 RX ADMIN — Medication 250 MILLIGRAM(S): at 18:28

## 2019-01-26 RX ADMIN — Medication 3: at 10:02

## 2019-01-26 RX ADMIN — OXYCODONE HYDROCHLORIDE 15 MILLIGRAM(S): 5 TABLET ORAL at 19:20

## 2019-01-26 RX ADMIN — GABAPENTIN 400 MILLIGRAM(S): 400 CAPSULE ORAL at 06:39

## 2019-01-26 RX ADMIN — ALBUTEROL 2.5 MILLIGRAM(S): 90 AEROSOL, METERED ORAL at 03:49

## 2019-01-26 RX ADMIN — OXYCODONE HYDROCHLORIDE 15 MILLIGRAM(S): 5 TABLET ORAL at 22:50

## 2019-01-26 RX ADMIN — Medication 1: at 18:27

## 2019-01-26 RX ADMIN — Medication 25 MILLIGRAM(S): at 06:39

## 2019-01-26 RX ADMIN — CHLORHEXIDINE GLUCONATE 15 MILLILITER(S): 213 SOLUTION TOPICAL at 18:27

## 2019-01-26 RX ADMIN — Medication 10 UNIT(S): at 02:07

## 2019-01-26 RX ADMIN — OXYCODONE HYDROCHLORIDE 15 MILLIGRAM(S): 5 TABLET ORAL at 02:37

## 2019-01-26 RX ADMIN — Medication 10 MILLIGRAM(S): at 06:39

## 2019-01-26 RX ADMIN — ALBUTEROL 2.5 MILLIGRAM(S): 90 AEROSOL, METERED ORAL at 10:17

## 2019-01-26 RX ADMIN — OXYCODONE HYDROCHLORIDE 15 MILLIGRAM(S): 5 TABLET ORAL at 06:39

## 2019-01-26 RX ADMIN — ALBUTEROL 2.5 MILLIGRAM(S): 90 AEROSOL, METERED ORAL at 22:30

## 2019-01-26 RX ADMIN — Medication 1 APPLICATION(S): at 15:00

## 2019-01-26 RX ADMIN — OXYCODONE HYDROCHLORIDE 15 MILLIGRAM(S): 5 TABLET ORAL at 10:00

## 2019-01-26 NOTE — PROVIDER CONTACT NOTE (MEDICATION) - ASSESSMENT
Patient is NPO with sips and chips, FS was 78 at 6pm. Fingerstick repeated resulted 75. discussed with MD and MD wants to give dextrose IV push. Patient is A&O x4 and able to swallow
A&O x4. VS stable, voids
Patient is NPO with sips and chips,  A&Ox4.
patient alert and oriented x 4 able to make needs known.

## 2019-01-26 NOTE — PROGRESS NOTE ADULT - ASSESSMENT
63y Female hx of COPD presented initially with SBO, underwent ex lap 12/15 closed with 4 retention sutures, evicerated on 12/30 and closed with 6 more retention sutures, c/b multifocal pneumonia, developed hypercapnic respiratory failure requiring intubation 1/6, s/p bronch 1/9/18, improving and tolerating CPAP, still intubated, course further c/b Enterococci bacteremia. Transferred to MICU at request of patient/family.     Neuro:  - continue ativan 0.5 mg PO PRN for discomfort/ anxiety  - ct gabapentin to 400mg tid  - dilaudid PCA D/Rob today. Now on PRN regimen of oxycodone and dilaudid     Resp:  - Intubated 1/6, extubated to BIPAP 1/24, now on high flow nasal cannula during the day, tolerating well  - C/w albuterol and Symbicort   - pulm following, will f/u recs     CV:   - Continue amlodipine 10mg  - Metoprolol 25mg po BID   - Lasix 40 mg IV QD    GI:  - s/p 2*ex-lap, 10 retention sutures in situ, gaping wound covered in fibrinous exudate   - santyl to wound daily  - c/w protonix daily, home med  - bolus feeds  - started bowel regimen today 1/25    Renal:  - BMP and I&O monitoring as routine, stable BMP   - iraheta removed, patient spontaneously voiding   - Repletion of electrolytes PRN    Heme:  - H/H stable   - VTE ppx w/ Lovenox and IPCs--increasing lovenox to BID in line with weight based dosing     ID:  - blood cultures 1/12 growing Enterococcus faecium, repeat blood culture 1/17 NGTD  - lines: iraheta removed, rt IJV (1/6) changed to lt IJV 1/13,  ET 1/6  - Source of bacteremia not identified may be 2/2 abdominal wound (no abscess on CT) or other. ID following and recommend considering MATEO and continuance of Vancomycin, will plan to resume abx. MATEO negative   - c/w vancomycin 750mg Q12hrs -- ID recs 2 week course (will D/C on 1/31)    Endo:  - hx RA on chronic prednisone, DM, hypothyroid  - Hydrocortisone changed to home Prednisone dose 10mg Po ngt  - Synthroid 137mcg od through ngt on empty stomach    - Lantus 26 units q hs and Humalog 10units TID AC (prior to bolus feeds) along with low dose corrective scale to cover, monitor sugar levels and adjust insulin accordingly     Lines:  -left IJ central line  -amaury feeding tube  -ET 63y Female hx of COPD presented initially with SBO, underwent ex lap 12/15 closed with 4 retention sutures, evicerated on 12/30 and closed with 6 more retention sutures, c/b multifocal pneumonia, developed hypercapnic respiratory failure requiring intubation 1/6, s/p bronch 1/9/18, improving and tolerating CPAP, still intubated, course further c/b Enterococci bacteremia. Transferred to MICU at request of patient/family.

## 2019-01-26 NOTE — PROGRESS NOTE ADULT - SUBJECTIVE AND OBJECTIVE BOX
CHIEF COMPLAINT:    Interval Events:    REVIEW OF SYSTEMS:  Constitutional:   Eyes:  ENT:  CV:  Resp:  GI:  :  MSK:  Integumentary:  Neurological:  Psychiatric:  Endocrine:  Hematologic/Lymphatic:  Allergic/Immunologic:  [ ] All other systems negative  [ ] Unable to assess ROS because ________    OBJECTIVE:  ICU Vital Signs Last 24 Hrs  T(C): 36.3 (26 Jan 2019 06:36), Max: 37.3 (25 Jan 2019 12:00)  T(F): 97.4 (26 Jan 2019 06:36), Max: 99.2 (25 Jan 2019 12:00)  HR: 110 (26 Jan 2019 06:36) (91 - 123)  BP: 140/78 (26 Jan 2019 06:36) (122/72 - 143/84)  BP(mean): 80 (25 Jan 2019 12:00) (80 - 94)  ABP: --  ABP(mean): --  RR: 19 (26 Jan 2019 06:36) (19 - 23)  SpO2: 90% (26 Jan 2019 06:36) (87% - 97%)    Mode: NIV (Noninvasive Ventilation), RR (machine): 18, TV (machine): 400, FiO2: 70, PEEP: 5, ITime: 0.9, PIP: 12    01-25 @ 07:01  -  01-26 @ 07:00  --------------------------------------------------------  IN: 1420 mL / OUT: 600 mL / NET: 820 mL      CAPILLARY BLOOD GLUCOSE      POCT Blood Glucose.: 138 mg/dL (26 Jan 2019 01:38)        HOSPITAL MEDICATIONS:  MEDICATIONS  (STANDING):  acetaminophen    Suspension .. 1000 milliGRAM(s) Oral every 6 hours  ALBUTerol    0.083% 2.5 milliGRAM(s) Nebulizer every 6 hours  amLODIPine   Tablet 10 milliGRAM(s) Oral daily  buDESOnide 160 MICROgram(s)/formoterol 4.5 MICROgram(s) Inhaler 2 Puff(s) Inhalation two times a day  chlorhexidine 0.12% Liquid 15 milliLiter(s) Swish and Spit two times a day  chlorhexidine 4% Liquid 1 Application(s) Topical <User Schedule>  collagenase Ointment 1 Application(s) Topical daily  docusate sodium Liquid 100 milliGRAM(s) Oral two times a day  enoxaparin Injectable 40 milliGRAM(s) SubCutaneous two times a day  furosemide   Injectable 40 milliGRAM(s) IV Push daily  gabapentin   Solution 400 milliGRAM(s) Oral three times a day  influenza   Vaccine 0.5 milliLiter(s) IntraMuscular once  insulin glargine Injectable (LANTUS) 22 Unit(s) SubCutaneous at bedtime  insulin lispro (HumaLOG) corrective regimen sliding scale   SubCutaneous every 8 hours  insulin lispro Injectable (HumaLOG) 10 Unit(s) SubCutaneous every 8 hours  levothyroxine 137 MICROGram(s) Oral daily  metoprolol tartrate 25 milliGRAM(s) Enteral Tube two times a day  oxyCODONE    Solution 15 milliGRAM(s) Oral every 4 hours  pantoprazole   Suspension 40 milliGRAM(s) Oral daily  polyethylene glycol 3350 17 Gram(s) Oral daily  predniSONE   Tablet 10 milliGRAM(s) Oral daily  senna Syrup 10 milliLiter(s) Oral at bedtime  simvastatin 20 milliGRAM(s) Oral at bedtime  sodium chloride 3%  Inhalation 3 milliLiter(s) Inhalation two times a day  vancomycin  IVPB 750 milliGRAM(s) IV Intermittent every 12 hours    MEDICATIONS  (PRN):  HYDROmorphone  Injectable 1 milliGRAM(s) IV Push every 4 hours PRN Severe Pain (7 - 10)  naloxone Injectable 0.1 milliGRAM(s) IV Push every 3 minutes PRN For ANY of the following changes in patient status:  A. RR LESS THAN 10 breaths per minute, B. Oxygen saturation LESS THAN 90%, C. Sedation score of 6      LABS:                        9.9    9.38  )-----------( 465      ( 26 Jan 2019 06:00 )             34.6     01-26    144  |  101  |  18  ----------------------------<  172<H>  4.5   |  29  |  0.88    Ca    9.7      26 Jan 2019 06:00  Phos  2.4     01-25  Mg     2.1     01-25                MICROBIOLOGY:     RADIOLOGY:  [ ] Reviewed and interpreted by me    PULMONARY FUNCTION TESTS:    EKG: CHIEF COMPLAINT: f/up r, copd,,nandini failure-bacteremia---tight abdomen, slightly better breathing--abdomen pain    Interval Events: RCU trransfer    REVIEW OF SYSTEMS:  Constitutional:   Eyes:  ENT:  CV:  Resp:  GI:  :  MSK:  Integumentary:  Neurological:  Psychiatric:  Endocrine:  Hematologic/Lymphatic:  Allergic/Immunologic:  [+ ] All other systems negative  [ ] Unable to assess ROS because ________    OBJECTIVE:  ICU Vital Signs Last 24 Hrs  T(C): 36.3 (26 Jan 2019 06:36), Max: 37.3 (25 Jan 2019 12:00)  T(F): 97.4 (26 Jan 2019 06:36), Max: 99.2 (25 Jan 2019 12:00)  HR: 110 (26 Jan 2019 06:36) (91 - 123)  BP: 140/78 (26 Jan 2019 06:36) (122/72 - 143/84)  BP(mean): 80 (25 Jan 2019 12:00) (80 - 94)  ABP: --  ABP(mean): --  RR: 19 (26 Jan 2019 06:36) (19 - 23)  SpO2: 90% (26 Jan 2019 06:36) (87% - 97%)    Mode: NIV (Noninvasive Ventilation), RR (machine): 18, TV (machine): 400, FiO2: 70, PEEP: 5, ITime: 0.9, PIP: 12    01-25 @ 07:01  -  01-26 @ 07:00  --------------------------------------------------------  IN: 1420 mL / OUT: 600 mL / NET: 820 mL      CAPILLARY BLOOD GLUCOSE      POCT Blood Glucose.: 138 mg/dL (26 Jan 2019 01:38)        HOSPITAL MEDICATIONS:  MEDICATIONS  (STANDING):  acetaminophen    Suspension .. 1000 milliGRAM(s) Oral every 6 hours  ALBUTerol    0.083% 2.5 milliGRAM(s) Nebulizer every 6 hours  amLODIPine   Tablet 10 milliGRAM(s) Oral daily  buDESOnide 160 MICROgram(s)/formoterol 4.5 MICROgram(s) Inhaler 2 Puff(s) Inhalation two times a day  chlorhexidine 0.12% Liquid 15 milliLiter(s) Swish and Spit two times a day  chlorhexidine 4% Liquid 1 Application(s) Topical <User Schedule>  collagenase Ointment 1 Application(s) Topical daily  docusate sodium Liquid 100 milliGRAM(s) Oral two times a day  enoxaparin Injectable 40 milliGRAM(s) SubCutaneous two times a day  furosemide   Injectable 40 milliGRAM(s) IV Push daily  gabapentin   Solution 400 milliGRAM(s) Oral three times a day  influenza   Vaccine 0.5 milliLiter(s) IntraMuscular once  insulin glargine Injectable (LANTUS) 22 Unit(s) SubCutaneous at bedtime  insulin lispro (HumaLOG) corrective regimen sliding scale   SubCutaneous every 8 hours  insulin lispro Injectable (HumaLOG) 10 Unit(s) SubCutaneous every 8 hours  levothyroxine 137 MICROGram(s) Oral daily  metoprolol tartrate 25 milliGRAM(s) Enteral Tube two times a day  oxyCODONE    Solution 15 milliGRAM(s) Oral every 4 hours  pantoprazole   Suspension 40 milliGRAM(s) Oral daily  polyethylene glycol 3350 17 Gram(s) Oral daily  predniSONE   Tablet 10 milliGRAM(s) Oral daily  senna Syrup 10 milliLiter(s) Oral at bedtime  simvastatin 20 milliGRAM(s) Oral at bedtime  sodium chloride 3%  Inhalation 3 milliLiter(s) Inhalation two times a day  vancomycin  IVPB 750 milliGRAM(s) IV Intermittent every 12 hours    MEDICATIONS  (PRN):  HYDROmorphone  Injectable 1 milliGRAM(s) IV Push every 4 hours PRN Severe Pain (7 - 10)  naloxone Injectable 0.1 milliGRAM(s) IV Push every 3 minutes PRN For ANY of the following changes in patient status:  A. RR LESS THAN 10 breaths per minute, B. Oxygen saturation LESS THAN 90%, C. Sedation score of 6    PHYSICAL EXAM:    General: NAD on bipap  HEENT: NC/AT; PERRL, clear conjunctiva  Neck: supple  Respiratory: CTA b/l-poor excursion-reduced BS bases  Cardiovascular: +S1/S2; RRR  Abdomen: soft, NT/ND; +BS x4  Extremities: WWP, 2+ peripheral pulses b/l; no LE edema  Skin: normal color and turgor; no rash  Neurological: NF-able to move all estrs        LABS:                        9.9    9.38  )-----------( 465      ( 26 Jan 2019 06:00 )             34.6     01-26    144  |  101  |  18  ----------------------------<  172<H>  4.5   |  29  |  0.88    Ca    9.7      26 Jan 2019 06:00  Phos  2.4     01-25  Mg     2.1     01-25                MICROBIOLOGY:     RADIOLOGY:  [ ] Reviewed and interpreted by me    PULMONARY FUNCTION TESTS:    EKG: CHIEF COMPLAINT: f/up r, copd,,nandini failure-bacteremia---tight abdomen, slightly better breathing--abdomen pain    Interval Events: RCU trransfer    REVIEW OF SYSTEMS:  Constitutional: feels better  CV: denies chest pain  Resp: denies SOB  GI: denies abd pain  [x] All other systems negative    OBJECTIVE:  ICU Vital Signs Last 24 Hrs  T(C): 36.3 (26 Jan 2019 06:36), Max: 37.3 (25 Jan 2019 12:00)  T(F): 97.4 (26 Jan 2019 06:36), Max: 99.2 (25 Jan 2019 12:00)  HR: 110 (26 Jan 2019 06:36) (91 - 123)  BP: 140/78 (26 Jan 2019 06:36) (122/72 - 143/84)  BP(mean): 80 (25 Jan 2019 12:00) (80 - 94)  ABP: --  ABP(mean): --  RR: 19 (26 Jan 2019 06:36) (19 - 23)  SpO2: 90% (26 Jan 2019 06:36) (87% - 97%)    Mode: NIV (Noninvasive Ventilation), RR (machine): 18, TV (machine): 400, FiO2: 70, PEEP: 5, ITime: 0.9, PIP: 12    01-25 @ 07:01  -  01-26 @ 07:00  --------------------------------------------------------  IN: 1420 mL / OUT: 600 mL / NET: 820 mL      CAPILLARY BLOOD GLUCOSE      POCT Blood Glucose.: 138 mg/dL (26 Jan 2019 01:38)        HOSPITAL MEDICATIONS:  MEDICATIONS  (STANDING):  acetaminophen    Suspension .. 1000 milliGRAM(s) Oral every 6 hours  ALBUTerol    0.083% 2.5 milliGRAM(s) Nebulizer every 6 hours  amLODIPine   Tablet 10 milliGRAM(s) Oral daily  buDESOnide 160 MICROgram(s)/formoterol 4.5 MICROgram(s) Inhaler 2 Puff(s) Inhalation two times a day  chlorhexidine 0.12% Liquid 15 milliLiter(s) Swish and Spit two times a day  chlorhexidine 4% Liquid 1 Application(s) Topical <User Schedule>  collagenase Ointment 1 Application(s) Topical daily  docusate sodium Liquid 100 milliGRAM(s) Oral two times a day  enoxaparin Injectable 40 milliGRAM(s) SubCutaneous two times a day  furosemide   Injectable 40 milliGRAM(s) IV Push daily  gabapentin   Solution 400 milliGRAM(s) Oral three times a day  influenza   Vaccine 0.5 milliLiter(s) IntraMuscular once  insulin glargine Injectable (LANTUS) 22 Unit(s) SubCutaneous at bedtime  insulin lispro (HumaLOG) corrective regimen sliding scale   SubCutaneous every 8 hours  insulin lispro Injectable (HumaLOG) 10 Unit(s) SubCutaneous every 8 hours  levothyroxine 137 MICROGram(s) Oral daily  metoprolol tartrate 25 milliGRAM(s) Enteral Tube two times a day  oxyCODONE    Solution 15 milliGRAM(s) Oral every 4 hours  pantoprazole   Suspension 40 milliGRAM(s) Oral daily  polyethylene glycol 3350 17 Gram(s) Oral daily  predniSONE   Tablet 10 milliGRAM(s) Oral daily  senna Syrup 10 milliLiter(s) Oral at bedtime  simvastatin 20 milliGRAM(s) Oral at bedtime  sodium chloride 3%  Inhalation 3 milliLiter(s) Inhalation two times a day  vancomycin  IVPB 750 milliGRAM(s) IV Intermittent every 12 hours    MEDICATIONS  (PRN):  HYDROmorphone  Injectable 1 milliGRAM(s) IV Push every 4 hours PRN Severe Pain (7 - 10)  naloxone Injectable 0.1 milliGRAM(s) IV Push every 3 minutes PRN For ANY of the following changes in patient status:  A. RR LESS THAN 10 breaths per minute, B. Oxygen saturation LESS THAN 90%, C. Sedation score of 6    PHYSICAL EXAM:    General: NAD on bipap  HEENT: NC/AT; PERRL, clear conjunctiva  Neck: supple  Respiratory: CTA b/l-poor excursion-reduced BS bases  Cardiovascular: +S1/S2; RRR  Abdomen: soft, NT/ND; +BS x4  Extremities: WWP, 2+ peripheral pulses b/l; no LE edema  Skin: normal color and turgor; no rash  Neurological: NF-able to move all estrs        LABS:                        9.9    9.38  )-----------( 465      ( 26 Jan 2019 06:00 )             34.6     01-26    144  |  101  |  18  ----------------------------<  172<H>  4.5   |  29  |  0.88    Ca    9.7      26 Jan 2019 06:00  Phos  2.4     01-25  Mg     2.1     01-25

## 2019-01-26 NOTE — PROGRESS NOTE ADULT - ATTENDING COMMENTS
as -above-  Resp failure--obesity, asthma, s/p sepsis, debility  obesity- nutrition evaluation/sp and sw evaln                  ID-Vanco f/up P and T  asthma-symbicort/spiriva, duoneb q 6  CHRIS-bipap o/n  Hypoxemia-hiflo--taper as able -sat above 90%  DVT/GI proph, PT  RA-pred dosing  Sotero Manrique MD-Pulmonary   [phone]

## 2019-01-26 NOTE — PROGRESS NOTE ADULT - PROBLEM SELECTOR PLAN 9
DVT prophlaxis:  subcutaneous lovenox or heparin as per primary team  sequential teds stockings  early ambulation  GI prophylaxis:-PPI pre breakfast-aspiration precautions

## 2019-01-26 NOTE — PROVIDER CONTACT NOTE (MEDICATION) - BACKGROUND
Transferred from [facility]
Transferred from [facility], s/p ex-SHANNON paez
cholecystitis
patient admitted for SBO. PMH: Diverticulosis. asthma. htn, cva evisceration. patient received 11pm dose of Lovenox but refusing am dose.

## 2019-01-27 LAB
ANION GAP SERPL CALC-SCNC: 14 MMO/L — SIGNIFICANT CHANGE UP (ref 7–14)
BUN SERPL-MCNC: 24 MG/DL — HIGH (ref 7–23)
CALCIUM SERPL-MCNC: 9.7 MG/DL — SIGNIFICANT CHANGE UP (ref 8.4–10.5)
CHLORIDE SERPL-SCNC: 98 MMOL/L — SIGNIFICANT CHANGE UP (ref 98–107)
CO2 SERPL-SCNC: 31 MMOL/L — SIGNIFICANT CHANGE UP (ref 22–31)
CREAT SERPL-MCNC: 0.89 MG/DL — SIGNIFICANT CHANGE UP (ref 0.5–1.3)
GLUCOSE BLDC GLUCOMTR-MCNC: 107 MG/DL — HIGH (ref 70–99)
GLUCOSE BLDC GLUCOMTR-MCNC: 133 MG/DL — HIGH (ref 70–99)
GLUCOSE BLDC GLUCOMTR-MCNC: 177 MG/DL — HIGH (ref 70–99)
GLUCOSE BLDC GLUCOMTR-MCNC: 254 MG/DL — HIGH (ref 70–99)
GLUCOSE BLDC GLUCOMTR-MCNC: 300 MG/DL — HIGH (ref 70–99)
GLUCOSE SERPL-MCNC: 229 MG/DL — HIGH (ref 70–99)
HCT VFR BLD CALC: 31.2 % — LOW (ref 34.5–45)
HGB BLD-MCNC: 9 G/DL — LOW (ref 11.5–15.5)
MAGNESIUM SERPL-MCNC: 2.1 MG/DL — SIGNIFICANT CHANGE UP (ref 1.6–2.6)
MCHC RBC-ENTMCNC: 28.8 % — LOW (ref 32–36)
MCHC RBC-ENTMCNC: 30.2 PG — SIGNIFICANT CHANGE UP (ref 27–34)
MCV RBC AUTO: 104.7 FL — HIGH (ref 80–100)
NRBC # FLD: 0.17 K/UL — LOW (ref 25–125)
NRBC FLD-RTO: 1.7 — SIGNIFICANT CHANGE UP
PHOSPHATE SERPL-MCNC: 4.3 MG/DL — SIGNIFICANT CHANGE UP (ref 2.5–4.5)
PLATELET # BLD AUTO: 430 K/UL — HIGH (ref 150–400)
PMV BLD: 9.9 FL — SIGNIFICANT CHANGE UP (ref 7–13)
POTASSIUM SERPL-MCNC: 4.5 MMOL/L — SIGNIFICANT CHANGE UP (ref 3.5–5.3)
POTASSIUM SERPL-SCNC: 4.5 MMOL/L — SIGNIFICANT CHANGE UP (ref 3.5–5.3)
RBC # BLD: 2.98 M/UL — LOW (ref 3.8–5.2)
RBC # FLD: 17.2 % — HIGH (ref 10.3–14.5)
SODIUM SERPL-SCNC: 143 MMOL/L — SIGNIFICANT CHANGE UP (ref 135–145)
WBC # BLD: 10.12 K/UL — SIGNIFICANT CHANGE UP (ref 3.8–10.5)
WBC # FLD AUTO: 10.12 K/UL — SIGNIFICANT CHANGE UP (ref 3.8–10.5)

## 2019-01-27 PROCEDURE — 99233 SBSQ HOSP IP/OBS HIGH 50: CPT

## 2019-01-27 RX ORDER — TRAZODONE HCL 50 MG
50 TABLET ORAL AT BEDTIME
Qty: 0 | Refills: 0 | Status: DISCONTINUED | OUTPATIENT
Start: 2019-01-27 | End: 2019-01-29

## 2019-01-27 RX ORDER — TRAZODONE HCL 50 MG
50 TABLET ORAL AT BEDTIME
Qty: 0 | Refills: 0 | Status: DISCONTINUED | OUTPATIENT
Start: 2019-01-27 | End: 2019-01-27

## 2019-01-27 RX ORDER — INSULIN LISPRO 100/ML
11 VIAL (ML) SUBCUTANEOUS EVERY 8 HOURS
Qty: 0 | Refills: 0 | Status: DISCONTINUED | OUTPATIENT
Start: 2019-01-27 | End: 2019-01-28

## 2019-01-27 RX ADMIN — GABAPENTIN 400 MILLIGRAM(S): 400 CAPSULE ORAL at 05:57

## 2019-01-27 RX ADMIN — ALBUTEROL 2.5 MILLIGRAM(S): 90 AEROSOL, METERED ORAL at 05:37

## 2019-01-27 RX ADMIN — INSULIN GLARGINE 22 UNIT(S): 100 INJECTION, SOLUTION SUBCUTANEOUS at 22:45

## 2019-01-27 RX ADMIN — OXYCODONE HYDROCHLORIDE 15 MILLIGRAM(S): 5 TABLET ORAL at 05:58

## 2019-01-27 RX ADMIN — ENOXAPARIN SODIUM 40 MILLIGRAM(S): 100 INJECTION SUBCUTANEOUS at 02:08

## 2019-01-27 RX ADMIN — OXYCODONE HYDROCHLORIDE 15 MILLIGRAM(S): 5 TABLET ORAL at 06:45

## 2019-01-27 RX ADMIN — SENNA PLUS 10 MILLILITER(S): 8.6 TABLET ORAL at 22:34

## 2019-01-27 RX ADMIN — ALBUTEROL 2.5 MILLIGRAM(S): 90 AEROSOL, METERED ORAL at 10:21

## 2019-01-27 RX ADMIN — SIMVASTATIN 20 MILLIGRAM(S): 20 TABLET, FILM COATED ORAL at 22:47

## 2019-01-27 RX ADMIN — Medication 10 UNIT(S): at 09:10

## 2019-01-27 RX ADMIN — CHLORHEXIDINE GLUCONATE 15 MILLILITER(S): 213 SOLUTION TOPICAL at 17:57

## 2019-01-27 RX ADMIN — PANTOPRAZOLE SODIUM 40 MILLIGRAM(S): 20 TABLET, DELAYED RELEASE ORAL at 12:48

## 2019-01-27 RX ADMIN — Medication 25 MILLIGRAM(S): at 17:57

## 2019-01-27 RX ADMIN — SODIUM CHLORIDE 3 MILLILITER(S): 9 INJECTION INTRAMUSCULAR; INTRAVENOUS; SUBCUTANEOUS at 10:21

## 2019-01-27 RX ADMIN — CHLORHEXIDINE GLUCONATE 1 APPLICATION(S): 213 SOLUTION TOPICAL at 12:48

## 2019-01-27 RX ADMIN — ENOXAPARIN SODIUM 40 MILLIGRAM(S): 100 INJECTION SUBCUTANEOUS at 22:45

## 2019-01-27 RX ADMIN — Medication 25 MILLIGRAM(S): at 05:56

## 2019-01-27 RX ADMIN — BUDESONIDE AND FORMOTEROL FUMARATE DIHYDRATE 2 PUFF(S): 160; 4.5 AEROSOL RESPIRATORY (INHALATION) at 21:59

## 2019-01-27 RX ADMIN — OXYCODONE HYDROCHLORIDE 15 MILLIGRAM(S): 5 TABLET ORAL at 02:30

## 2019-01-27 RX ADMIN — Medication 11 UNIT(S): at 18:01

## 2019-01-27 RX ADMIN — Medication 3: at 09:10

## 2019-01-27 RX ADMIN — Medication 10 MILLIGRAM(S): at 05:56

## 2019-01-27 RX ADMIN — GABAPENTIN 400 MILLIGRAM(S): 400 CAPSULE ORAL at 14:47

## 2019-01-27 RX ADMIN — Medication 200 MILLIGRAM(S): at 14:47

## 2019-01-27 RX ADMIN — Medication 250 MILLIGRAM(S): at 05:56

## 2019-01-27 RX ADMIN — ALBUTEROL 2.5 MILLIGRAM(S): 90 AEROSOL, METERED ORAL at 16:13

## 2019-01-27 RX ADMIN — Medication 10 UNIT(S): at 02:38

## 2019-01-27 RX ADMIN — Medication 137 MICROGRAM(S): at 05:56

## 2019-01-27 RX ADMIN — Medication 1 APPLICATION(S): at 14:47

## 2019-01-27 RX ADMIN — Medication 250 MILLIGRAM(S): at 17:57

## 2019-01-27 RX ADMIN — OXYCODONE HYDROCHLORIDE 15 MILLIGRAM(S): 5 TABLET ORAL at 02:40

## 2019-01-27 RX ADMIN — Medication 40 MILLIGRAM(S): at 05:57

## 2019-01-27 RX ADMIN — AMLODIPINE BESYLATE 10 MILLIGRAM(S): 2.5 TABLET ORAL at 12:48

## 2019-01-27 RX ADMIN — Medication 1: at 17:57

## 2019-01-27 RX ADMIN — OXYCODONE HYDROCHLORIDE 15 MILLIGRAM(S): 5 TABLET ORAL at 13:27

## 2019-01-27 RX ADMIN — OXYCODONE HYDROCHLORIDE 15 MILLIGRAM(S): 5 TABLET ORAL at 09:44

## 2019-01-27 RX ADMIN — BUDESONIDE AND FORMOTEROL FUMARATE DIHYDRATE 2 PUFF(S): 160; 4.5 AEROSOL RESPIRATORY (INHALATION) at 10:23

## 2019-01-27 RX ADMIN — SODIUM CHLORIDE 3 MILLILITER(S): 9 INJECTION INTRAMUSCULAR; INTRAVENOUS; SUBCUTANEOUS at 21:59

## 2019-01-27 RX ADMIN — GABAPENTIN 400 MILLIGRAM(S): 400 CAPSULE ORAL at 22:35

## 2019-01-27 RX ADMIN — HYDROMORPHONE HYDROCHLORIDE 1 MILLIGRAM(S): 2 INJECTION INTRAMUSCULAR; INTRAVENOUS; SUBCUTANEOUS at 17:56

## 2019-01-27 RX ADMIN — CHLORHEXIDINE GLUCONATE 15 MILLILITER(S): 213 SOLUTION TOPICAL at 05:56

## 2019-01-27 RX ADMIN — ALBUTEROL 2.5 MILLIGRAM(S): 90 AEROSOL, METERED ORAL at 21:59

## 2019-01-27 NOTE — PROGRESS NOTE ADULT - SUBJECTIVE AND OBJECTIVE BOX
Patient is a 63y old  Female who presents with a chief complaint of sbo (26 Jan 2019 08:04)        SUBJECTIVE / OVERNIGHT EVENTS:      MEDICATIONS  (STANDING):  ALBUTerol    0.083% 2.5 milliGRAM(s) Nebulizer every 6 hours  amLODIPine   Tablet 10 milliGRAM(s) Oral daily  buDESOnide 160 MICROgram(s)/formoterol 4.5 MICROgram(s) Inhaler 2 Puff(s) Inhalation two times a day  chlorhexidine 0.12% Liquid 15 milliLiter(s) Swish and Spit two times a day  chlorhexidine 4% Liquid 1 Application(s) Topical <User Schedule>  collagenase Ointment 1 Application(s) Topical daily  docusate sodium Liquid 100 milliGRAM(s) Oral two times a day  enoxaparin Injectable 40 milliGRAM(s) SubCutaneous two times a day  furosemide   Injectable 40 milliGRAM(s) IV Push daily  gabapentin   Solution 400 milliGRAM(s) Oral three times a day  influenza   Vaccine 0.5 milliLiter(s) IntraMuscular once  insulin glargine Injectable (LANTUS) 22 Unit(s) SubCutaneous at bedtime  insulin lispro (HumaLOG) corrective regimen sliding scale   SubCutaneous every 8 hours  insulin lispro Injectable (HumaLOG) 10 Unit(s) SubCutaneous every 8 hours  levothyroxine 137 MICROGram(s) Oral daily  metoprolol tartrate 25 milliGRAM(s) Enteral Tube two times a day  pantoprazole   Suspension 40 milliGRAM(s) Oral daily  polyethylene glycol 3350 17 Gram(s) Oral daily  predniSONE   Tablet 10 milliGRAM(s) Oral daily  senna Syrup 10 milliLiter(s) Oral at bedtime  simvastatin 20 milliGRAM(s) Oral at bedtime  sodium chloride 3%  Inhalation 3 milliLiter(s) Inhalation two times a day  vancomycin  IVPB 750 milliGRAM(s) IV Intermittent every 12 hours    MEDICATIONS  (PRN):  HYDROmorphone  Injectable 1 milliGRAM(s) IV Push every 4 hours PRN Severe Pain (7 - 10)  naloxone Injectable 0.1 milliGRAM(s) IV Push every 3 minutes PRN For ANY of the following changes in patient status:  A. RR LESS THAN 10 breaths per minute, B. Oxygen saturation LESS THAN 90%, C. Sedation score of 6  oxyCODONE    Solution 10 milliGRAM(s) Oral every 3 hours PRN Moderate Pain (4 - 6)  oxyCODONE    Solution 15 milliGRAM(s) Oral every 3 hours PRN Severe Pain (7 - 10)        CAPILLARY BLOOD GLUCOSE      POCT Blood Glucose.: 254 mg/dL (27 Jan 2019 08:16)  POCT Blood Glucose.: 133 mg/dL (27 Jan 2019 01:46)  POCT Blood Glucose.: 136 mg/dL (26 Jan 2019 21:52)  POCT Blood Glucose.: 194 mg/dL (26 Jan 2019 17:55)  POCT Blood Glucose.: 290 mg/dL (26 Jan 2019 09:50)    I&O's Summary    26 Jan 2019 07:01  -  27 Jan 2019 07:00  --------------------------------------------------------  IN: 1550 mL / OUT: 300 mL / NET: 1250 mL        PHYSICAL EXAM  GENERAL: NAD, well-developed  HEAD:  Atraumatic, Normocephalic  EYES: EOMI, PERRLA, conjunctiva and sclera clear  NECK: Supple, No JVD  CHEST/LUNG: Clear to auscultation bilaterally; No wheeze  HEART: Regular rate and rhythm; No murmurs, rubs, or gallops  ABDOMEN: Soft, Nontender, Nondistended; Bowel sounds present  EXTREMITIES:  2+ Peripheral Pulses, No clubbing, cyanosis, or edema  PSYCH: AAOx3  SKIN: No rashes or lesions    LABS:                        9.0    10.12 )-----------( 430      ( 27 Jan 2019 06:22 )             31.2     01-27    143  |  98  |  24<H>  ----------------------------<  229<H>  4.5   |  31  |  0.89    Ca    9.7      27 Jan 2019 06:22  Phos  4.3     01-27  Mg     2.1     01-27                RADIOLOGY & ADDITIONAL TESTS:    Imaging Personally Reviewed:  Consultant(s) Notes Reviewed:    Care Discussed with Consultants/Other Providers: Patient is a 63y old  Female who presents with a chief complaint of sbo (26 Jan 2019 08:04)        SUBJECTIVE / OVERNIGHT EVENTS: PT reports having mucus sensation.       MEDICATIONS  (STANDING):  ALBUTerol    0.083% 2.5 milliGRAM(s) Nebulizer every 6 hours  amLODIPine   Tablet 10 milliGRAM(s) Oral daily  buDESOnide 160 MICROgram(s)/formoterol 4.5 MICROgram(s) Inhaler 2 Puff(s) Inhalation two times a day  chlorhexidine 0.12% Liquid 15 milliLiter(s) Swish and Spit two times a day  chlorhexidine 4% Liquid 1 Application(s) Topical <User Schedule>  collagenase Ointment 1 Application(s) Topical daily  docusate sodium Liquid 100 milliGRAM(s) Oral two times a day  enoxaparin Injectable 40 milliGRAM(s) SubCutaneous two times a day  furosemide   Injectable 40 milliGRAM(s) IV Push daily  gabapentin   Solution 400 milliGRAM(s) Oral three times a day  influenza   Vaccine 0.5 milliLiter(s) IntraMuscular once  insulin glargine Injectable (LANTUS) 22 Unit(s) SubCutaneous at bedtime  insulin lispro (HumaLOG) corrective regimen sliding scale   SubCutaneous every 8 hours  insulin lispro Injectable (HumaLOG) 10 Unit(s) SubCutaneous every 8 hours  levothyroxine 137 MICROGram(s) Oral daily  metoprolol tartrate 25 milliGRAM(s) Enteral Tube two times a day  pantoprazole   Suspension 40 milliGRAM(s) Oral daily  polyethylene glycol 3350 17 Gram(s) Oral daily  predniSONE   Tablet 10 milliGRAM(s) Oral daily  senna Syrup 10 milliLiter(s) Oral at bedtime  simvastatin 20 milliGRAM(s) Oral at bedtime  sodium chloride 3%  Inhalation 3 milliLiter(s) Inhalation two times a day  vancomycin  IVPB 750 milliGRAM(s) IV Intermittent every 12 hours    MEDICATIONS  (PRN):  HYDROmorphone  Injectable 1 milliGRAM(s) IV Push every 4 hours PRN Severe Pain (7 - 10)  naloxone Injectable 0.1 milliGRAM(s) IV Push every 3 minutes PRN For ANY of the following changes in patient status:  A. RR LESS THAN 10 breaths per minute, B. Oxygen saturation LESS THAN 90%, C. Sedation score of 6  oxyCODONE    Solution 10 milliGRAM(s) Oral every 3 hours PRN Moderate Pain (4 - 6)  oxyCODONE    Solution 15 milliGRAM(s) Oral every 3 hours PRN Severe Pain (7 - 10)        CAPILLARY BLOOD GLUCOSE      POCT Blood Glucose.: 254 mg/dL (27 Jan 2019 08:16)  POCT Blood Glucose.: 133 mg/dL (27 Jan 2019 01:46)  POCT Blood Glucose.: 136 mg/dL (26 Jan 2019 21:52)  POCT Blood Glucose.: 194 mg/dL (26 Jan 2019 17:55)  POCT Blood Glucose.: 290 mg/dL (26 Jan 2019 09:50)    I&O's Summary    26 Jan 2019 07:01  -  27 Jan 2019 07:00  --------------------------------------------------------  IN: 1550 mL / OUT: 300 mL / NET: 1250 mL        PHYSICAL EXAM:    General: NAD on bipap  HEENT: NC/AT; PERRL, clear conjunctiva  Neck: supple  Respiratory: CTA b/l-poor excursion-reduced BS bases  Cardiovascular: +S1/S2; RRR  Abdomen: soft, NT/ND; +BS x4  Extremities: WWP, 2+ peripheral pulses b/l; no LE edema  Skin: normal color and turgor; no rash  Neurological: NF-able to move all extremeties      LABS:                        9.0    10.12 )-----------( 430      ( 27 Jan 2019 06:22 )             31.2     01-27    143  |  98  |  24<H>  ----------------------------<  229<H>  4.5   |  31  |  0.89    Ca    9.7      27 Jan 2019 06:22  Phos  4.3     01-27  Mg     2.1     01-27                RADIOLOGY & ADDITIONAL TESTS:    Imaging Personally Reviewed:  Consultant(s) Notes Reviewed:    Care Discussed with Consultants/Other Providers:

## 2019-01-27 NOTE — PROGRESS NOTE ADULT - ASSESSMENT
63y Female hx of COPD presented initially with SBO, underwent ex lap 12/15 closed with 4 retention sutures, evicerated on 12/30 and closed with 6 more retention sutures, c/b multifocal pneumonia, developed hypercapnic respiratory failure requiring intubation 1/6, s/p bronch 1/9/18, improving and tolerating CPAP, still intubated, course further c/b Enterococci bacteremia. Transferred to MICU at request of patient/family.

## 2019-01-27 NOTE — CHART NOTE - NSCHARTNOTEFT_GEN_A_CORE
Patient not seen at bedside. Patient is on Glucerna enteral bolus feeds every 8hrs, still intubated. Still on prednisone 10mg. Can increase lantus to 24 units qhs and humalog to 11 units TID, with patient having variable FS with bolus feeds. Monitor FS.     If any issues/questions can call endocrinology [phone] Patient not seen at bedside. Patient is on Glucerna enteral bolus feeds every 8hrs, still intubated. Still on prednisone 10mg. Would keep lantus at 22 units qhs with patient previously well controlled. May consider increasing if still hyperglycemic following day in AM. Can increase humalog for now to just11 units q8hrs with patient having variable FS with bolus feeds and more hyperglycemic today. Monitor FS.     If any issues/questions can call endocrinology [phone]

## 2019-01-28 LAB
ANION GAP SERPL CALC-SCNC: 13 MMO/L — SIGNIFICANT CHANGE UP (ref 7–14)
BASOPHILS # BLD AUTO: 0.08 K/UL — SIGNIFICANT CHANGE UP (ref 0–0.2)
BASOPHILS NFR BLD AUTO: 0.8 % — SIGNIFICANT CHANGE UP (ref 0–2)
BUN SERPL-MCNC: 21 MG/DL — SIGNIFICANT CHANGE UP (ref 7–23)
CALCIUM SERPL-MCNC: 9.4 MG/DL — SIGNIFICANT CHANGE UP (ref 8.4–10.5)
CHLORIDE SERPL-SCNC: 97 MMOL/L — LOW (ref 98–107)
CO2 SERPL-SCNC: 33 MMOL/L — HIGH (ref 22–31)
CREAT SERPL-MCNC: 0.86 MG/DL — SIGNIFICANT CHANGE UP (ref 0.5–1.3)
EOSINOPHIL # BLD AUTO: 0.42 K/UL — SIGNIFICANT CHANGE UP (ref 0–0.5)
EOSINOPHIL NFR BLD AUTO: 4.3 % — SIGNIFICANT CHANGE UP (ref 0–6)
GLUCOSE BLDC GLUCOMTR-MCNC: 105 MG/DL — HIGH (ref 70–99)
GLUCOSE BLDC GLUCOMTR-MCNC: 131 MG/DL — HIGH (ref 70–99)
GLUCOSE BLDC GLUCOMTR-MCNC: 145 MG/DL — HIGH (ref 70–99)
GLUCOSE BLDC GLUCOMTR-MCNC: 145 MG/DL — HIGH (ref 70–99)
GLUCOSE BLDC GLUCOMTR-MCNC: 183 MG/DL — HIGH (ref 70–99)
GLUCOSE SERPL-MCNC: 230 MG/DL — HIGH (ref 70–99)
HCT VFR BLD CALC: 30.6 % — LOW (ref 34.5–45)
HGB BLD-MCNC: 9.1 G/DL — LOW (ref 11.5–15.5)
IMM GRANULOCYTES NFR BLD AUTO: 1.6 % — HIGH (ref 0–1.5)
LYMPHOCYTES # BLD AUTO: 2.47 K/UL — SIGNIFICANT CHANGE UP (ref 1–3.3)
LYMPHOCYTES # BLD AUTO: 25.1 % — SIGNIFICANT CHANGE UP (ref 13–44)
MCHC RBC-ENTMCNC: 29.7 % — LOW (ref 32–36)
MCHC RBC-ENTMCNC: 30.4 PG — SIGNIFICANT CHANGE UP (ref 27–34)
MCV RBC AUTO: 102.3 FL — HIGH (ref 80–100)
MONOCYTES # BLD AUTO: 1.51 K/UL — HIGH (ref 0–0.9)
MONOCYTES NFR BLD AUTO: 15.3 % — HIGH (ref 2–14)
NEUTROPHILS # BLD AUTO: 5.21 K/UL — SIGNIFICANT CHANGE UP (ref 1.8–7.4)
NEUTROPHILS NFR BLD AUTO: 52.9 % — SIGNIFICANT CHANGE UP (ref 43–77)
NRBC # FLD: 0.24 K/UL — LOW (ref 25–125)
NRBC FLD-RTO: 2.4 — SIGNIFICANT CHANGE UP
PLATELET # BLD AUTO: 377 K/UL — SIGNIFICANT CHANGE UP (ref 150–400)
PMV BLD: 9.7 FL — SIGNIFICANT CHANGE UP (ref 7–13)
POTASSIUM SERPL-MCNC: 4.2 MMOL/L — SIGNIFICANT CHANGE UP (ref 3.5–5.3)
POTASSIUM SERPL-SCNC: 4.2 MMOL/L — SIGNIFICANT CHANGE UP (ref 3.5–5.3)
RBC # BLD: 2.99 M/UL — LOW (ref 3.8–5.2)
RBC # FLD: 17 % — HIGH (ref 10.3–14.5)
SODIUM SERPL-SCNC: 143 MMOL/L — SIGNIFICANT CHANGE UP (ref 135–145)
VANCOMYCIN TROUGH SERPL-MCNC: 20.4 UG/ML — HIGH (ref 10–20)
WBC # BLD: 9.85 K/UL — SIGNIFICANT CHANGE UP (ref 3.8–10.5)
WBC # FLD AUTO: 9.85 K/UL — SIGNIFICANT CHANGE UP (ref 3.8–10.5)

## 2019-01-28 PROCEDURE — 99233 SBSQ HOSP IP/OBS HIGH 50: CPT | Mod: GC

## 2019-01-28 PROCEDURE — 99232 SBSQ HOSP IP/OBS MODERATE 35: CPT

## 2019-01-28 RX ORDER — VANCOMYCIN HCL 1 G
500 VIAL (EA) INTRAVENOUS EVERY 12 HOURS
Qty: 0 | Refills: 0 | Status: DISCONTINUED | OUTPATIENT
Start: 2019-01-28 | End: 2019-01-31

## 2019-01-28 RX ORDER — INSULIN LISPRO 100/ML
6 VIAL (ML) SUBCUTANEOUS
Qty: 0 | Refills: 0 | Status: DISCONTINUED | OUTPATIENT
Start: 2019-01-28 | End: 2019-01-28

## 2019-01-28 RX ORDER — ACETAMINOPHEN 500 MG
650 TABLET ORAL ONCE
Qty: 0 | Refills: 0 | Status: COMPLETED | OUTPATIENT
Start: 2019-01-28 | End: 2019-01-28

## 2019-01-28 RX ORDER — VANCOMYCIN HCL 1 G
VIAL (EA) INTRAVENOUS
Qty: 0 | Refills: 0 | Status: DISCONTINUED | OUTPATIENT
Start: 2019-01-28 | End: 2019-01-31

## 2019-01-28 RX ORDER — INSULIN LISPRO 100/ML
VIAL (ML) SUBCUTANEOUS
Qty: 0 | Refills: 0 | Status: DISCONTINUED | OUTPATIENT
Start: 2019-01-28 | End: 2019-01-29

## 2019-01-28 RX ORDER — INSULIN LISPRO 100/ML
6 VIAL (ML) SUBCUTANEOUS
Qty: 0 | Refills: 0 | Status: DISCONTINUED | OUTPATIENT
Start: 2019-01-28 | End: 2019-01-29

## 2019-01-28 RX ORDER — VANCOMYCIN HCL 1 G
500 VIAL (EA) INTRAVENOUS ONCE
Qty: 0 | Refills: 0 | Status: COMPLETED | OUTPATIENT
Start: 2019-01-28 | End: 2019-01-28

## 2019-01-28 RX ADMIN — ALBUTEROL 2.5 MILLIGRAM(S): 90 AEROSOL, METERED ORAL at 21:51

## 2019-01-28 RX ADMIN — AMLODIPINE BESYLATE 10 MILLIGRAM(S): 2.5 TABLET ORAL at 12:23

## 2019-01-28 RX ADMIN — GABAPENTIN 400 MILLIGRAM(S): 400 CAPSULE ORAL at 13:32

## 2019-01-28 RX ADMIN — HYDROMORPHONE HYDROCHLORIDE 1 MILLIGRAM(S): 2 INJECTION INTRAMUSCULAR; INTRAVENOUS; SUBCUTANEOUS at 21:00

## 2019-01-28 RX ADMIN — SODIUM CHLORIDE 3 MILLILITER(S): 9 INJECTION INTRAMUSCULAR; INTRAVENOUS; SUBCUTANEOUS at 10:10

## 2019-01-28 RX ADMIN — Medication 25 MILLIGRAM(S): at 06:26

## 2019-01-28 RX ADMIN — BUDESONIDE AND FORMOTEROL FUMARATE DIHYDRATE 2 PUFF(S): 160; 4.5 AEROSOL RESPIRATORY (INHALATION) at 10:08

## 2019-01-28 RX ADMIN — OXYCODONE HYDROCHLORIDE 15 MILLIGRAM(S): 5 TABLET ORAL at 12:23

## 2019-01-28 RX ADMIN — PANTOPRAZOLE SODIUM 40 MILLIGRAM(S): 20 TABLET, DELAYED RELEASE ORAL at 12:24

## 2019-01-28 RX ADMIN — Medication 10 MILLIGRAM(S): at 06:25

## 2019-01-28 RX ADMIN — Medication 40 MILLIGRAM(S): at 06:25

## 2019-01-28 RX ADMIN — Medication 100 MILLIGRAM(S): at 06:25

## 2019-01-28 RX ADMIN — Medication 6 UNIT(S): at 18:17

## 2019-01-28 RX ADMIN — ALBUTEROL 2.5 MILLIGRAM(S): 90 AEROSOL, METERED ORAL at 03:45

## 2019-01-28 RX ADMIN — ALBUTEROL 2.5 MILLIGRAM(S): 90 AEROSOL, METERED ORAL at 15:55

## 2019-01-28 RX ADMIN — OXYCODONE HYDROCHLORIDE 15 MILLIGRAM(S): 5 TABLET ORAL at 18:17

## 2019-01-28 RX ADMIN — Medication 25 MILLIGRAM(S): at 18:16

## 2019-01-28 RX ADMIN — SODIUM CHLORIDE 3 MILLILITER(S): 9 INJECTION INTRAMUSCULAR; INTRAVENOUS; SUBCUTANEOUS at 21:55

## 2019-01-28 RX ADMIN — GABAPENTIN 400 MILLIGRAM(S): 400 CAPSULE ORAL at 06:25

## 2019-01-28 RX ADMIN — Medication 650 MILLIGRAM(S): at 14:02

## 2019-01-28 RX ADMIN — Medication 1 APPLICATION(S): at 13:40

## 2019-01-28 RX ADMIN — Medication 200 MILLIGRAM(S): at 14:46

## 2019-01-28 RX ADMIN — Medication 11 UNIT(S): at 10:49

## 2019-01-28 RX ADMIN — Medication 100 MILLIGRAM(S): at 13:31

## 2019-01-28 RX ADMIN — Medication 650 MILLIGRAM(S): at 13:32

## 2019-01-28 RX ADMIN — Medication 11 UNIT(S): at 02:03

## 2019-01-28 RX ADMIN — CHLORHEXIDINE GLUCONATE 15 MILLILITER(S): 213 SOLUTION TOPICAL at 18:18

## 2019-01-28 RX ADMIN — ALBUTEROL 2.5 MILLIGRAM(S): 90 AEROSOL, METERED ORAL at 10:11

## 2019-01-28 RX ADMIN — Medication 137 MICROGRAM(S): at 06:26

## 2019-01-28 RX ADMIN — BUDESONIDE AND FORMOTEROL FUMARATE DIHYDRATE 2 PUFF(S): 160; 4.5 AEROSOL RESPIRATORY (INHALATION) at 21:54

## 2019-01-28 RX ADMIN — HYDROMORPHONE HYDROCHLORIDE 1 MILLIGRAM(S): 2 INJECTION INTRAMUSCULAR; INTRAVENOUS; SUBCUTANEOUS at 06:27

## 2019-01-28 RX ADMIN — HYDROMORPHONE HYDROCHLORIDE 1 MILLIGRAM(S): 2 INJECTION INTRAMUSCULAR; INTRAVENOUS; SUBCUTANEOUS at 03:53

## 2019-01-28 RX ADMIN — CHLORHEXIDINE GLUCONATE 1 APPLICATION(S): 213 SOLUTION TOPICAL at 10:49

## 2019-01-28 RX ADMIN — HYDROMORPHONE HYDROCHLORIDE 1 MILLIGRAM(S): 2 INJECTION INTRAMUSCULAR; INTRAVENOUS; SUBCUTANEOUS at 20:31

## 2019-01-28 RX ADMIN — CHLORHEXIDINE GLUCONATE 15 MILLILITER(S): 213 SOLUTION TOPICAL at 06:24

## 2019-01-28 NOTE — PROGRESS NOTE ADULT - ATTENDING COMMENTS
Pt awake and alert, fully oriented.  On nocturnal avaps, daytime high flow.  She does still require high levels of O2, pulse around 110-120.  Will check CTA for evaluation for PE superimposed on underlying lung disease.  New periph IV placed.  D/C TLC.    Fever x 1 this morning.  Awaiting CTA for evaluation now.  If spikes again, full cultures.  Abd benign, seen by surgery this morning. Pt awake and alert, fully oriented. Breathing comfortably, no distress  On nocturnal avaps, daytime high flow.  She does still require high levels of O2, pulse around 110-120.  Will check CTA for evaluation for PE superimposed on underlying lung disease.  New periph IV placed.  D/C TLC.  Speech and swallow appreciated. begin dysphagia diet. d/c ngt    Fever x 1 this morning.  Awaiting CTA for evaluation now.  If spikes again, full cultures.  Abd benign, seen by surgery this morning.

## 2019-01-28 NOTE — CHART NOTE - NSCHARTNOTEFT_GEN_A_CORE
:  Patricio Camacho    INDICATION:  Hypoxemia    PROCEDURE:  [x ] LIMITED ECHO  [x ] LIMITED DVT    FINDINGS:    Normal LV systolic function. Normal RV size and function. No pericardial effusion.  There is no flattening of the interventricular septum during the cardiac cycle.     The bilateral leg veins were examined at the common femoral vein, saphenous vein, and profunda and found to be compressible.   The bilateral popliteal veins are compressible.     INTERPRETATION:    No evidence of DVT  No evidence of RV pressure or volume overload.   Large PE not likely.       Concern for PE given patient's hypoxemia, persistent immobility and obesity.   Patient was not stable for CTA due to hypoxemia so bedside ultrasound was done to investigate VTE as source of hypoxemia.   No evidence of VTE at this time.

## 2019-01-28 NOTE — PROGRESS NOTE ADULT - PROBLEM SELECTOR PLAN 1
multifactorial--obesity, asthma, osas, ARDS multifactorial--obesity, asthma, osas, ARDS  - Increase mobility attempt oob , sitting in chair position   -PT

## 2019-01-28 NOTE — PROGRESS NOTE ADULT - PROBLEM SELECTOR PLAN 6
nutrition evaluation  sp and sw rhonda nutrition evaluation  Seen by speech and puree diet with nectar thick liquids

## 2019-01-28 NOTE — PROGRESS NOTE ADULT - SUBJECTIVE AND OBJECTIVE BOX
B-Team Surgery Progress Note     Subjective/24hour Events: no acute event overnight, extubated over the weekend on high flow nasal cannula during the day and CPAP at night. Hydrocel guaze to abdominal wound. Tolerating nasogastric tube feeds.      Vital Signs Last 24 Hrs  T(C): 37 (28 Jan 2019 06:00), Max: 37.1 (27 Jan 2019 12:45)  T(F): 98.6 (28 Jan 2019 06:00), Max: 98.8 (27 Jan 2019 12:45)  HR: 114 (28 Jan 2019 06:00) (95 - 121)  BP: 110/60 (28 Jan 2019 06:00) (110/60 - 138/70)  BP(mean): --  RR: 18 (28 Jan 2019 06:00) (18 - 22)  SpO2: 91% (28 Jan 2019 06:00) (90% - 95%)    I&O's Summary    26 Jan 2019 07:01  -  27 Jan 2019 07:00  --------------------------------------------------------  IN: 2320 mL / OUT: 1200 mL / NET: 1120 mL    27 Jan 2019 07:01  -  28 Jan 2019 06:38  --------------------------------------------------------  IN: 650 mL / OUT: 0 mL / NET: 650 mL      Physical Exam:  Gen: NAD, awake and interactive   Pulm: on high flow nasal cannula, saturating well on 80% inspired O2  Card: pulse regularly and vital sign stable per monitoring   GI: Obese, soft, non-distended, non-tender, retention sutures in place (several very loose) with fibrinous exudate over granulation tissue    CBC (01-27 @ 06:22)                              9.0<L>                         10.12   )----------------(  430<H>     --    % Neutrophils, --    % Lymphocytes, ANC: --                                  31.2<L>                BMP (01-27 @ 06:22)             143     |  98      |  24<H> 		Ca++ --      Ca 9.7                ---------------------------------( 229<H>		Mg 2.1                4.5     |  31      |  0.89  			Ph 4.3

## 2019-01-28 NOTE — PROGRESS NOTE ADULT - SUBJECTIVE AND OBJECTIVE BOX
Chief Complaint: DM2, hypothyroid    History:  Patient was extubated 2 days ago. Continue on Glucerna bolus feeds via NGT.  Per son at bedside she has been intermittently confused not at baseline.    MEDICATIONS  (STANDING):  ALBUTerol    0.083% 2.5 milliGRAM(s) Nebulizer every 6 hours  amLODIPine   Tablet 10 milliGRAM(s) Oral daily  buDESOnide 160 MICROgram(s)/formoterol 4.5 MICROgram(s) Inhaler 2 Puff(s) Inhalation two times a day  chlorhexidine 0.12% Liquid 15 milliLiter(s) Swish and Spit two times a day  chlorhexidine 4% Liquid 1 Application(s) Topical <User Schedule>  collagenase Ointment 1 Application(s) Topical daily  docusate sodium Liquid 100 milliGRAM(s) Oral two times a day  enoxaparin Injectable 40 milliGRAM(s) SubCutaneous two times a day  furosemide   Injectable 40 milliGRAM(s) IV Push daily  gabapentin   Solution 400 milliGRAM(s) Oral three times a day  influenza   Vaccine 0.5 milliLiter(s) IntraMuscular once  insulin glargine Injectable (LANTUS) 22 Unit(s) SubCutaneous at bedtime  insulin lispro (HumaLOG) corrective regimen sliding scale   SubCutaneous every 8 hours  insulin lispro Injectable (HumaLOG) 11 Unit(s) SubCutaneous every 8 hours  levothyroxine 137 MICROGram(s) Oral daily  metoprolol tartrate 25 milliGRAM(s) Enteral Tube two times a day  pantoprazole   Suspension 40 milliGRAM(s) Oral daily  polyethylene glycol 3350 17 Gram(s) Oral daily  predniSONE   Tablet 10 milliGRAM(s) Oral daily  senna Syrup 10 milliLiter(s) Oral at bedtime  simvastatin 20 milliGRAM(s) Oral at bedtime  sodium chloride 3%  Inhalation 3 milliLiter(s) Inhalation two times a day  vancomycin  IVPB      vancomycin  IVPB 500 milliGRAM(s) IV Intermittent every 12 hours    MEDICATIONS  (PRN):  guaiFENesin   Syrup  (Sugar-Free) 200 milliGRAM(s) Oral every 6 hours PRN Cough  HYDROmorphone  Injectable 1 milliGRAM(s) IV Push every 4 hours PRN Severe Pain (7 - 10)  naloxone Injectable 0.1 milliGRAM(s) IV Push every 3 minutes PRN For ANY of the following changes in patient status:  A. RR LESS THAN 10 breaths per minute, B. Oxygen saturation LESS THAN 90%, C. Sedation score of 6  oxyCODONE    Solution 10 milliGRAM(s) Oral every 3 hours PRN Moderate Pain (4 - 6)  oxyCODONE    Solution 15 milliGRAM(s) Oral every 3 hours PRN Severe Pain (7 - 10)  traZODone 50 milliGRAM(s) Oral at bedtime PRN Insomnia      Allergies    No Known Allergies    Intolerances    Seroquel (Other)    Review of Systems:    UNABLE TO OBTAIN    PHYSICAL EXAM:  VITALS: T(C): 37.3 (01-28-19 @ 14:45)  T(F): 99.2 (01-28-19 @ 14:45), Max: 101.8 (01-28-19 @ 12:20)  HR: 131 (01-28-19 @ 12:20) (80 - 131)  BP: 116/71 (01-28-19 @ 12:20) (110/60 - 127/83)  RR:  (18 - 22)  SpO2:  (89% - 94%)  Wt(kg): --  GENERAL: NAD, appears comfortable, + cushingoid appearance  EYES: no lid lag, anicteric  HEENT:  Atraumatic, Normocephalic, moist mucous membranes  RESPIRATORY: Nonlabored, no wheezing, O2 mask in place  GI: NGT in place        CAPILLARY BLOOD GLUCOSE      POCT Blood Glucose.: 145 mg/dL (28 Jan 2019 10:14)  POCT Blood Glucose.: 131 mg/dL (28 Jan 2019 08:08)  POCT Blood Glucose.: 105 mg/dL (28 Jan 2019 01:37)  POCT Blood Glucose.: 107 mg/dL (27 Jan 2019 22:43)  POCT Blood Glucose.: 177 mg/dL (27 Jan 2019 17:54)      01-28    143  |  97<L>  |  21  ----------------------------<  230<H>  4.2   |  33<H>  |  0.86    EGFR if : 85  EGFR if non : 73    Ca    9.4      01-28  Mg     2.1     01-27  Phos  4.3     01-27            Thyroid Function Tests:      Hemoglobin A1C, Whole Blood: 7.1 % <H> [4.0 - 5.6] (12-17-18 @ 03:50)  Hemoglobin A1C, Whole Blood: 7.3 % <H> [4.0 - 5.6] (12-16-18 @ 04:17)

## 2019-01-28 NOTE — PROGRESS NOTE ADULT - PROBLEM SELECTOR PLAN 3
O2-bipap to [place] as able then taper to NC O2-bipap to [place] as able then taper to NC  Pt unable to tolerate 100%NRB for transport at this time CTA on hold

## 2019-01-28 NOTE — PROGRESS NOTE ADULT - ASSESSMENT
63F w/ multiple respiratory comorbidities p/w SBO s/p ex-lap with findings of healthy bowel c/b evisceration of bowel requiring placement of retention sutures, difficult to wean off ventilation, MATEO done 01/23 to evaluate endocarditis, tolerating CPAP with anticipation for extubation today     Plan  - Per family and patient's wishes, planning for transfer to [email or web address]]   - awaiting insurance approval for transfer  - Will review with attending, likely can remove several retention sutures today  - Continue BID dressing changes to midline wound.   - Follow up ID for management of IV vancomycin course for enteroccocal bacteremia     D/W Dr. Koch    [facility] R3 [phone]

## 2019-01-28 NOTE — SWALLOW BEDSIDE ASSESSMENT ADULT - SWALLOW EVAL: DIAGNOSIS
Baseline stats prior to PO Trials: SPO2 88% receiving 90% FiO2 high flow nasal cannula.  Patient presents with functional oropharyngeal stage swallowing mechanism characterized by adequate oral containment, adequate bolus manipulation and transport for puree. There is laryngeal elevation upon palpation, initiation of the pharyngeal swallow. There were no overt signs of impaired airway protection for puree/honey thick liquids/nectar thick liquids/thin liquids trials.  Patient's SPO2 range from 85 to 88 during PO intake trials with HR ~85 to 125 (Team made aware). Suggest a conservative Puree with Nectar Thick Liquid diet and monitor patient's respiratory status during meal time given patient currently requires high flow nasal cannula which can impact the respiratory/swallowing coordination.

## 2019-01-28 NOTE — PROGRESS NOTE ADULT - ASSESSMENT
63 F with PMH of COPD (on home O2 (3L), hypothyroidism, T2DM (HbA1c 8s 11/2018, on insulin), RA on prednisone (10 mg daily x 30 years), HTN,  admitted for SBO s/p ex lap with lysis of adhesions, had been intubated for hypercapnic respiratory failure, now extubated. Patient is on Glucerna enteral bolus feeds.

## 2019-01-28 NOTE — PROGRESS NOTE ADULT - PROBLEM SELECTOR PLAN 7
BS control BS control  Lantus hs   Premeal decreased with change in diet - eval pts intake   Endo following

## 2019-01-28 NOTE — PROVIDER CONTACT NOTE (MEDICATION) - ACTION/TREATMENT ORDERED:
ADS aware of status of wound.  Infectious disease also involved.
MD made aware, agrees with dextrose gel. Awaiting new orders.
MD made aware, patient can refuse, provide ongoing education on the importance of Lantus.
OK to give dilaudid IVP early
provider educated on risk of not taking medication. patient Continues to Refuse.

## 2019-01-28 NOTE — PROGRESS NOTE ADULT - PROBLEM SELECTOR PLAN 2
vanco for enterococcus--750 q 12-f/up p and t vanco for enterococcus-level 20.4 trough decreased to 500mg iv  Recheck trough level before 4th dose   TLC catheter dc

## 2019-01-28 NOTE — SWALLOW BEDSIDE ASSESSMENT ADULT - COMMENTS
Pulmonary Note 1/28/2019 - 63y Female hx of COPD presented initially with SBO, underwent ex lap 12/15 closed with 4 retention sutures, evicerated on 12/30 and closed with 6 more retention sutures, c/b multifocal pneumonia, developed hypercapnic respiratory failure requiring intubation 1/6, s/p bronch 1/9/18, improving and tolerating CPAP, still intubated, course further c/b Enterococci bacteremia. Transferred to MICU at request of patient/family.     As per Respiratory Note 1/28/2019 at 11:38 89% SPO2 and HR 80/min (See Note) on high flow nasal cannula at 90% FiO2.     Patient seen at bedside, alert and oriented state. Patient is able to follow simple commands and express simple needs. Patient reports that her voice is at baseline with no complaints given s/p extubations.     Baseline SPO2 is 88% and HR ~85/min prior to PO trials.

## 2019-01-28 NOTE — PROVIDER CONTACT NOTE (MEDICATION) - SITUATION
Spoke to ADS re abdominal wound.
 after Orange juice, refusing Lantus dose
Pt c/o pain 10/10
patient refused lovenox sub-q. this afternoon.

## 2019-01-28 NOTE — CHART NOTE - NSCHARTNOTEFT_GEN_A_CORE
Source: Patient , EMR   Diet : Dysphagia 1 Pureed Nectar consistency Fluid     Patient s/p swallow evaluation today and PO diet initiated , will monitor tolerance and intake , noted intentional wt. loss , pt. will benefit from adjusting diet to the therapeutic need - Consistent carbohydrate , low sodium , Dysphagia 1 Pureed Nectar consistency Fluid diet . Pt. was on EN support .     Current Weight: - 92 kg on 1/27/19; BMI - 38.3 BASEDON Current wt. ; Admit wt. - 114.6 kg ; IBW - 47.7 kg      Pertinent Medications: MEDICATIONS  (STANDING):  ALBUTerol    0.083% 2.5 milliGRAM(s) Nebulizer every 6 hours  amLODIPine   Tablet 10 milliGRAM(s) Oral daily  buDESOnide 160 MICROgram(s)/formoterol 4.5 MICROgram(s) Inhaler 2 Puff(s) Inhalation two times a day  chlorhexidine 0.12% Liquid 15 milliLiter(s) Swish and Spit two times a day  chlorhexidine 4% Liquid 1 Application(s) Topical <User Schedule>  collagenase Ointment 1 Application(s) Topical daily  docusate sodium Liquid 100 milliGRAM(s) Oral two times a day  enoxaparin Injectable 40 milliGRAM(s) SubCutaneous two times a day  furosemide   Injectable 40 milliGRAM(s) IV Push daily  gabapentin   Solution 400 milliGRAM(s) Oral three times a day  influenza   Vaccine 0.5 milliLiter(s) IntraMuscular once  insulin glargine Injectable (LANTUS) 22 Unit(s) SubCutaneous at bedtime  insulin lispro (HumaLOG) corrective regimen sliding scale   SubCutaneous every 8 hours  insulin lispro Injectable (HumaLOG) 11 Unit(s) SubCutaneous every 8 hours  levothyroxine 137 MICROGram(s) Oral daily  metoprolol tartrate 25 milliGRAM(s) Enteral Tube two times a day  pantoprazole   Suspension 40 milliGRAM(s) Oral daily  polyethylene glycol 3350 17 Gram(s) Oral daily  predniSONE   Tablet 10 milliGRAM(s) Oral daily  senna Syrup 10 milliLiter(s) Oral at bedtime  simvastatin 20 milliGRAM(s) Oral at bedtime  sodium chloride 3%  Inhalation 3 milliLiter(s) Inhalation two times a day  vancomycin  IVPB      vancomycin  IVPB 500 milliGRAM(s) IV Intermittent every 12 hours    MEDICATIONS  (PRN):  guaiFENesin   Syrup  (Sugar-Free) 200 milliGRAM(s) Oral every 6 hours PRN Cough  HYDROmorphone  Injectable 1 milliGRAM(s) IV Push every 4 hours PRN Severe Pain (7 - 10)  naloxone Injectable 0.1 milliGRAM(s) IV Push every 3 minutes PRN For ANY of the following changes in patient status:  A. RR LESS THAN 10 breaths per minute, B. Oxygen saturation LESS THAN 90%, C. Sedation score of 6  oxyCODONE    Solution 10 milliGRAM(s) Oral every 3 hours PRN Moderate Pain (4 - 6)  oxyCODONE    Solution 15 milliGRAM(s) Oral every 3 hours PRN Severe Pain (7 - 10)  traZODone 50 milliGRAM(s) Oral at bedtime PRN Insomnia    Pertinent Labs:  01-27 Na143 mmol/L Glu 229 mg/dL<H> K+ 4.5 mmol/L Cr  0.89 mg/dL BUN 24 mg/dL<H> 01-27 Phos 4.3 mg/dL 01-24 Alb 3.2 g/dL<L> 01-17 PAB 27 mg/dL      Estimated Needs:  1300 kcal/d & protein 95 gm/d -  recalculated:       Recommend -  Consistent carbohydrate , Low Sodium , Dysphagia 1 Pureed Nectar consistency Fluid diet     Monitoring and Evaluation:  PO intake , Tolerance to diet prescription, weights and follow up per protocol

## 2019-01-28 NOTE — PROGRESS NOTE ADULT - PROBLEM SELECTOR PLAN 1
Continues on bolus feeds and continues on prednisone 10mg. BG well controlled today.  Would continue Lantus to 22 units at bedtime.  Continue Humalog 11 units prebolus q 8h. Hold if tube feeds are held.  Continue low Humalog scale q8h.  Goal glucose 100-180

## 2019-01-28 NOTE — PROGRESS NOTE ADULT - SUBJECTIVE AND OBJECTIVE BOX
CHIEF COMPLAINT:    Interval Events:    REVIEW OF SYSTEMS:  Constitutional:   Eyes:  ENT:  CV:  Resp:  GI:  :  MSK:  Integumentary:  Neurological:  Psychiatric:  Endocrine:  Hematologic/Lymphatic:  Allergic/Immunologic:  [ ] All other systems negative  [ ] Unable to assess ROS because ________    OBJECTIVE:  ICU Vital Signs Last 24 Hrs  T(C): 37 (28 Jan 2019 06:00), Max: 37.1 (27 Jan 2019 12:45)  T(F): 98.6 (28 Jan 2019 06:00), Max: 98.8 (27 Jan 2019 12:45)  HR: 114 (28 Jan 2019 06:00) (95 - 121)  BP: 110/60 (28 Jan 2019 06:00) (110/60 - 138/70)  BP(mean): --  ABP: --  ABP(mean): --  RR: 18 (28 Jan 2019 06:00) (18 - 22)  SpO2: 91% (28 Jan 2019 06:00) (90% - 95%)    Mode: standby    01-27 @ 07:01  -  01-28 @ 07:00  --------------------------------------------------------  IN: 650 mL / OUT: 0 mL / NET: 650 mL      CAPILLARY BLOOD GLUCOSE      POCT Blood Glucose.: 105 mg/dL (28 Jan 2019 01:37)      PHYSICAL EXAM:  General:   HEENT:   Lymph Nodes:  Neck:   Respiratory:   Cardiovascular:   Abdomen:   Extremities:   Skin:   Neurological:  Psychiatry:    HOSPITAL MEDICATIONS:  MEDICATIONS  (STANDING):  ALBUTerol    0.083% 2.5 milliGRAM(s) Nebulizer every 6 hours  amLODIPine   Tablet 10 milliGRAM(s) Oral daily  buDESOnide 160 MICROgram(s)/formoterol 4.5 MICROgram(s) Inhaler 2 Puff(s) Inhalation two times a day  chlorhexidine 0.12% Liquid 15 milliLiter(s) Swish and Spit two times a day  chlorhexidine 4% Liquid 1 Application(s) Topical <User Schedule>  collagenase Ointment 1 Application(s) Topical daily  docusate sodium Liquid 100 milliGRAM(s) Oral two times a day  enoxaparin Injectable 40 milliGRAM(s) SubCutaneous two times a day  furosemide   Injectable 40 milliGRAM(s) IV Push daily  gabapentin   Solution 400 milliGRAM(s) Oral three times a day  influenza   Vaccine 0.5 milliLiter(s) IntraMuscular once  insulin glargine Injectable (LANTUS) 22 Unit(s) SubCutaneous at bedtime  insulin lispro (HumaLOG) corrective regimen sliding scale   SubCutaneous every 8 hours  insulin lispro Injectable (HumaLOG) 11 Unit(s) SubCutaneous every 8 hours  levothyroxine 137 MICROGram(s) Oral daily  metoprolol tartrate 25 milliGRAM(s) Enteral Tube two times a day  pantoprazole   Suspension 40 milliGRAM(s) Oral daily  polyethylene glycol 3350 17 Gram(s) Oral daily  predniSONE   Tablet 10 milliGRAM(s) Oral daily  senna Syrup 10 milliLiter(s) Oral at bedtime  simvastatin 20 milliGRAM(s) Oral at bedtime  sodium chloride 3%  Inhalation 3 milliLiter(s) Inhalation two times a day  vancomycin  IVPB 750 milliGRAM(s) IV Intermittent every 12 hours    MEDICATIONS  (PRN):  guaiFENesin   Syrup  (Sugar-Free) 200 milliGRAM(s) Oral every 6 hours PRN Cough  HYDROmorphone  Injectable 1 milliGRAM(s) IV Push every 4 hours PRN Severe Pain (7 - 10)  naloxone Injectable 0.1 milliGRAM(s) IV Push every 3 minutes PRN For ANY of the following changes in patient status:  A. RR LESS THAN 10 breaths per minute, B. Oxygen saturation LESS THAN 90%, C. Sedation score of 6  oxyCODONE    Solution 10 milliGRAM(s) Oral every 3 hours PRN Moderate Pain (4 - 6)  oxyCODONE    Solution 15 milliGRAM(s) Oral every 3 hours PRN Severe Pain (7 - 10)  traZODone 50 milliGRAM(s) Oral at bedtime PRN Insomnia      LABS:                        9.0    10.12 )-----------( 430      ( 27 Jan 2019 06:22 )             31.2     01-27    143  |  98  |  24<H>  ----------------------------<  229<H>  4.5   |  31  |  0.89    Ca    9.7      27 Jan 2019 06:22  Phos  4.3     01-27  Mg     2.1     01-27                MICROBIOLOGY:     RADIOLOGY:  [ ] Reviewed and interpreted by me    PULMONARY FUNCTION TESTS:    EKG: CHIEF COMPLAINT: Patient is a 63y old  Female who presents with a chief complaint of SBO, respiratory failure (28 Jan 2019 06:36)      Interval Events: None     REVIEW OF SYSTEMS:  Constitutional: No fever or chills   CV: Denies   Resp: + COREAS   GI: + incisional pain   : Denies   MSK: Weakness  [ x] All other systems negative      OBJECTIVE:  ICU Vital Signs Last 24 Hrs  T(C): 37 (28 Jan 2019 06:00), Max: 37.1 (27 Jan 2019 12:45)  T(F): 98.6 (28 Jan 2019 06:00), Max: 98.8 (27 Jan 2019 12:45)  HR: 114 (28 Jan 2019 06:00) (95 - 121)  BP: 110/60 (28 Jan 2019 06:00) (110/60 - 138/70)  BP(mean): --  ABP: --  ABP(mean): --  RR: 18 (28 Jan 2019 06:00) (18 - 22)  SpO2: 91% (28 Jan 2019 06:00) (90% - 95%)    Mode: standby    01-27 @ 07:01  -  01-28 @ 07:00  --------------------------------------------------------  IN: 650 mL / OUT: 0 mL / NET: 650 mL      CAPILLARY BLOOD GLUCOSE      POCT Blood Glucose.: 105 mg/dL (28 Jan 2019 01:37)        HOSPITAL MEDICATIONS:  MEDICATIONS  (STANDING):  ALBUTerol    0.083% 2.5 milliGRAM(s) Nebulizer every 6 hours  amLODIPine   Tablet 10 milliGRAM(s) Oral daily  buDESOnide 160 MICROgram(s)/formoterol 4.5 MICROgram(s) Inhaler 2 Puff(s) Inhalation two times a day  chlorhexidine 0.12% Liquid 15 milliLiter(s) Swish and Spit two times a day  chlorhexidine 4% Liquid 1 Application(s) Topical <User Schedule>  collagenase Ointment 1 Application(s) Topical daily  docusate sodium Liquid 100 milliGRAM(s) Oral two times a day  enoxaparin Injectable 40 milliGRAM(s) SubCutaneous two times a day  furosemide   Injectable 40 milliGRAM(s) IV Push daily  gabapentin   Solution 400 milliGRAM(s) Oral three times a day  influenza   Vaccine 0.5 milliLiter(s) IntraMuscular once  insulin glargine Injectable (LANTUS) 22 Unit(s) SubCutaneous at bedtime  insulin lispro (HumaLOG) corrective regimen sliding scale   SubCutaneous every 8 hours  insulin lispro Injectable (HumaLOG) 11 Unit(s) SubCutaneous every 8 hours  levothyroxine 137 MICROGram(s) Oral daily  metoprolol tartrate 25 milliGRAM(s) Enteral Tube two times a day  pantoprazole   Suspension 40 milliGRAM(s) Oral daily  polyethylene glycol 3350 17 Gram(s) Oral daily  predniSONE   Tablet 10 milliGRAM(s) Oral daily  senna Syrup 10 milliLiter(s) Oral at bedtime  simvastatin 20 milliGRAM(s) Oral at bedtime  sodium chloride 3%  Inhalation 3 milliLiter(s) Inhalation two times a day  vancomycin  IVPB 750 milliGRAM(s) IV Intermittent every 12 hours    MEDICATIONS  (PRN):  guaiFENesin   Syrup  (Sugar-Free) 200 milliGRAM(s) Oral every 6 hours PRN Cough  HYDROmorphone  Injectable 1 milliGRAM(s) IV Push every 4 hours PRN Severe Pain (7 - 10)  naloxone Injectable 0.1 milliGRAM(s) IV Push every 3 minutes PRN For ANY of the following changes in patient status:  A. RR LESS THAN 10 breaths per minute, B. Oxygen saturation LESS THAN 90%, C. Sedation score of 6  oxyCODONE    Solution 10 milliGRAM(s) Oral every 3 hours PRN Moderate Pain (4 - 6)  oxyCODONE    Solution 15 milliGRAM(s) Oral every 3 hours PRN Severe Pain (7 - 10)  traZODone 50 milliGRAM(s) Oral at bedtime PRN Insomnia      LABS:                        9.0    10.12 )-----------( 430      ( 27 Jan 2019 06:22 )             31.2     01-27    143  |  98  |  24<H>  ----------------------------<  229<H>  4.5   |  31  |  0.89    Ca    9.7      27 Jan 2019 06:22  Phos  4.3     01-27  Mg     2.1 01-27                MICROBIOLOGY:     RADIOLOGY:  [ ] Reviewed and interpreted by me    PULMONARY FUNCTION TESTS:    EKG:

## 2019-01-29 LAB
ALBUMIN SERPL ELPH-MCNC: 3 G/DL — LOW (ref 3.3–5)
ALP SERPL-CCNC: 98 U/L — SIGNIFICANT CHANGE UP (ref 40–120)
ALT FLD-CCNC: 14 U/L — SIGNIFICANT CHANGE UP (ref 4–33)
ANION GAP SERPL CALC-SCNC: 12 MMO/L — SIGNIFICANT CHANGE UP (ref 7–14)
ANION GAP SERPL CALC-SCNC: 12 MMO/L — SIGNIFICANT CHANGE UP (ref 7–14)
APPEARANCE UR: CLEAR — SIGNIFICANT CHANGE UP
AST SERPL-CCNC: 15 U/L — SIGNIFICANT CHANGE UP (ref 4–32)
BACTERIA # UR AUTO: NEGATIVE — SIGNIFICANT CHANGE UP
BASE EXCESS BLDA CALC-SCNC: 7.8 MMOL/L — SIGNIFICANT CHANGE UP
BASOPHILS # BLD AUTO: 0.05 K/UL — SIGNIFICANT CHANGE UP (ref 0–0.2)
BASOPHILS NFR BLD AUTO: 0.5 % — SIGNIFICANT CHANGE UP (ref 0–2)
BILIRUB SERPL-MCNC: 0.2 MG/DL — SIGNIFICANT CHANGE UP (ref 0.2–1.2)
BILIRUB UR-MCNC: NEGATIVE — SIGNIFICANT CHANGE UP
BLOOD UR QL VISUAL: NEGATIVE — SIGNIFICANT CHANGE UP
BUN SERPL-MCNC: 16 MG/DL — SIGNIFICANT CHANGE UP (ref 7–23)
BUN SERPL-MCNC: 21 MG/DL — SIGNIFICANT CHANGE UP (ref 7–23)
CALCIUM SERPL-MCNC: 9 MG/DL — SIGNIFICANT CHANGE UP (ref 8.4–10.5)
CALCIUM SERPL-MCNC: 9.3 MG/DL — SIGNIFICANT CHANGE UP (ref 8.4–10.5)
CHLORIDE BLDA-SCNC: 102 MMOL/L — SIGNIFICANT CHANGE UP (ref 96–108)
CHLORIDE SERPL-SCNC: 95 MMOL/L — LOW (ref 98–107)
CHLORIDE SERPL-SCNC: 97 MMOL/L — LOW (ref 98–107)
CK SERPL-CCNC: 28 U/L — SIGNIFICANT CHANGE UP (ref 25–170)
CO2 SERPL-SCNC: 32 MMOL/L — HIGH (ref 22–31)
CO2 SERPL-SCNC: 32 MMOL/L — HIGH (ref 22–31)
COLOR SPEC: YELLOW — SIGNIFICANT CHANGE UP
CREAT SERPL-MCNC: 0.97 MG/DL — SIGNIFICANT CHANGE UP (ref 0.5–1.3)
CREAT SERPL-MCNC: 1.23 MG/DL — SIGNIFICANT CHANGE UP (ref 0.5–1.3)
EOSINOPHIL # BLD AUTO: 0.21 K/UL — SIGNIFICANT CHANGE UP (ref 0–0.5)
EOSINOPHIL NFR BLD AUTO: 2.1 % — SIGNIFICANT CHANGE UP (ref 0–6)
GLUCOSE BLDA-MCNC: 173 MG/DL — HIGH (ref 70–99)
GLUCOSE BLDC GLUCOMTR-MCNC: 103 MG/DL — HIGH (ref 70–99)
GLUCOSE BLDC GLUCOMTR-MCNC: 187 MG/DL — HIGH (ref 70–99)
GLUCOSE BLDC GLUCOMTR-MCNC: 203 MG/DL — HIGH (ref 70–99)
GLUCOSE BLDC GLUCOMTR-MCNC: 213 MG/DL — HIGH (ref 70–99)
GLUCOSE BLDC GLUCOMTR-MCNC: 223 MG/DL — HIGH (ref 70–99)
GLUCOSE BLDC GLUCOMTR-MCNC: 259 MG/DL — HIGH (ref 70–99)
GLUCOSE SERPL-MCNC: 167 MG/DL — HIGH (ref 70–99)
GLUCOSE SERPL-MCNC: 227 MG/DL — HIGH (ref 70–99)
GLUCOSE UR-MCNC: NEGATIVE — SIGNIFICANT CHANGE UP
HCO3 BLDA-SCNC: 31 MMOL/L — HIGH (ref 22–26)
HCT VFR BLD CALC: 31.6 % — LOW (ref 34.5–45)
HCT VFR BLD CALC: 31.7 % — LOW (ref 34.5–45)
HCT VFR BLDA CALC: 27.1 % — LOW (ref 34.5–46.5)
HGB BLD-MCNC: 9.2 G/DL — LOW (ref 11.5–15.5)
HGB BLD-MCNC: 9.3 G/DL — LOW (ref 11.5–15.5)
HGB BLDA-MCNC: 8.7 G/DL — LOW (ref 11.5–15.5)
HYALINE CASTS # UR AUTO: SIGNIFICANT CHANGE UP
IMM GRANULOCYTES NFR BLD AUTO: 1.5 % — SIGNIFICANT CHANGE UP (ref 0–1.5)
KETONES UR-MCNC: NEGATIVE — SIGNIFICANT CHANGE UP
LACTATE BLDA-SCNC: 2.4 MMOL/L — HIGH (ref 0.5–2)
LEUKOCYTE ESTERASE UR-ACNC: SIGNIFICANT CHANGE UP
LYMPHOCYTES # BLD AUTO: 1.67 K/UL — SIGNIFICANT CHANGE UP (ref 1–3.3)
LYMPHOCYTES # BLD AUTO: 16.5 % — SIGNIFICANT CHANGE UP (ref 13–44)
MAGNESIUM SERPL-MCNC: 1.8 MG/DL — SIGNIFICANT CHANGE UP (ref 1.6–2.6)
MAGNESIUM SERPL-MCNC: 1.9 MG/DL — SIGNIFICANT CHANGE UP (ref 1.6–2.6)
MCHC RBC-ENTMCNC: 29.1 % — LOW (ref 32–36)
MCHC RBC-ENTMCNC: 29.3 % — LOW (ref 32–36)
MCHC RBC-ENTMCNC: 29.7 PG — SIGNIFICANT CHANGE UP (ref 27–34)
MCHC RBC-ENTMCNC: 30.2 PG — SIGNIFICANT CHANGE UP (ref 27–34)
MCV RBC AUTO: 101.9 FL — HIGH (ref 80–100)
MCV RBC AUTO: 102.9 FL — HIGH (ref 80–100)
MONOCYTES # BLD AUTO: 1.14 K/UL — HIGH (ref 0–0.9)
MONOCYTES NFR BLD AUTO: 11.3 % — SIGNIFICANT CHANGE UP (ref 2–14)
NEUTROPHILS # BLD AUTO: 6.91 K/UL — SIGNIFICANT CHANGE UP (ref 1.8–7.4)
NEUTROPHILS NFR BLD AUTO: 68.1 % — SIGNIFICANT CHANGE UP (ref 43–77)
NITRITE UR-MCNC: NEGATIVE — SIGNIFICANT CHANGE UP
NRBC # FLD: 0.16 K/UL — LOW (ref 25–125)
NRBC # FLD: 0.19 K/UL — LOW (ref 25–125)
NRBC FLD-RTO: 1.6 — SIGNIFICANT CHANGE UP
NRBC FLD-RTO: 1.7 — SIGNIFICANT CHANGE UP
NT-PROBNP SERPL-SCNC: 200.8 PG/ML — SIGNIFICANT CHANGE UP
PCO2 BLDA: 58 MMHG — HIGH (ref 32–48)
PH BLDA: 7.38 PH — SIGNIFICANT CHANGE UP (ref 7.35–7.45)
PH UR: 6 — SIGNIFICANT CHANGE UP (ref 5–8)
PHOSPHATE SERPL-MCNC: 3.8 MG/DL — SIGNIFICANT CHANGE UP (ref 2.5–4.5)
PHOSPHATE SERPL-MCNC: 4.7 MG/DL — HIGH (ref 2.5–4.5)
PLATELET # BLD AUTO: 394 K/UL — SIGNIFICANT CHANGE UP (ref 150–400)
PLATELET # BLD AUTO: 402 K/UL — HIGH (ref 150–400)
PMV BLD: 10 FL — SIGNIFICANT CHANGE UP (ref 7–13)
PMV BLD: 9.7 FL — SIGNIFICANT CHANGE UP (ref 7–13)
PO2 BLDA: 66 MMHG — LOW (ref 83–108)
POTASSIUM BLDA-SCNC: 3.9 MMOL/L — SIGNIFICANT CHANGE UP (ref 3.4–4.5)
POTASSIUM SERPL-MCNC: 4.1 MMOL/L — SIGNIFICANT CHANGE UP (ref 3.5–5.3)
POTASSIUM SERPL-MCNC: 4.2 MMOL/L — SIGNIFICANT CHANGE UP (ref 3.5–5.3)
POTASSIUM SERPL-SCNC: 4.1 MMOL/L — SIGNIFICANT CHANGE UP (ref 3.5–5.3)
POTASSIUM SERPL-SCNC: 4.2 MMOL/L — SIGNIFICANT CHANGE UP (ref 3.5–5.3)
PROT SERPL-MCNC: 6.7 G/DL — SIGNIFICANT CHANGE UP (ref 6–8.3)
PROT UR-MCNC: 30 — SIGNIFICANT CHANGE UP
RBC # BLD: 3.08 M/UL — LOW (ref 3.8–5.2)
RBC # BLD: 3.1 M/UL — LOW (ref 3.8–5.2)
RBC # FLD: 16.7 % — HIGH (ref 10.3–14.5)
RBC # FLD: 17 % — HIGH (ref 10.3–14.5)
RBC CASTS # UR COMP ASSIST: SIGNIFICANT CHANGE UP (ref 0–?)
SAO2 % BLDA: 89.6 % — LOW (ref 95–99)
SODIUM BLDA-SCNC: 137 MMOL/L — SIGNIFICANT CHANGE UP (ref 136–146)
SODIUM SERPL-SCNC: 139 MMOL/L — SIGNIFICANT CHANGE UP (ref 135–145)
SODIUM SERPL-SCNC: 141 MMOL/L — SIGNIFICANT CHANGE UP (ref 135–145)
SP GR SPEC: 1.02 — SIGNIFICANT CHANGE UP (ref 1–1.04)
SQUAMOUS # UR AUTO: SIGNIFICANT CHANGE UP
TROPONIN T, HIGH SENSITIVITY: 40 NG/L — SIGNIFICANT CHANGE UP (ref ?–14)
UROBILINOGEN FLD QL: NORMAL — SIGNIFICANT CHANGE UP
WBC # BLD: 10.13 K/UL — SIGNIFICANT CHANGE UP (ref 3.8–10.5)
WBC # BLD: 11.36 K/UL — HIGH (ref 3.8–10.5)
WBC # FLD AUTO: 10.13 K/UL — SIGNIFICANT CHANGE UP (ref 3.8–10.5)
WBC # FLD AUTO: 11.36 K/UL — HIGH (ref 3.8–10.5)
WBC UR QL: HIGH (ref 0–?)

## 2019-01-29 PROCEDURE — 99233 SBSQ HOSP IP/OBS HIGH 50: CPT | Mod: GC

## 2019-01-29 PROCEDURE — 99291 CRITICAL CARE FIRST HOUR: CPT

## 2019-01-29 PROCEDURE — 99232 SBSQ HOSP IP/OBS MODERATE 35: CPT

## 2019-01-29 PROCEDURE — 71045 X-RAY EXAM CHEST 1 VIEW: CPT | Mod: 26

## 2019-01-29 RX ORDER — TRAZODONE HCL 50 MG
50 TABLET ORAL AT BEDTIME
Qty: 0 | Refills: 0 | Status: DISCONTINUED | OUTPATIENT
Start: 2019-01-29 | End: 2019-02-01

## 2019-01-29 RX ORDER — PANTOPRAZOLE SODIUM 20 MG/1
40 TABLET, DELAYED RELEASE ORAL
Qty: 0 | Refills: 0 | Status: DISCONTINUED | OUTPATIENT
Start: 2019-01-29 | End: 2019-02-02

## 2019-01-29 RX ORDER — ACETAMINOPHEN 500 MG
1000 TABLET ORAL ONCE
Qty: 0 | Refills: 0 | Status: COMPLETED | OUTPATIENT
Start: 2019-01-29 | End: 2019-01-29

## 2019-01-29 RX ORDER — GABAPENTIN 400 MG/1
400 CAPSULE ORAL THREE TIMES A DAY
Qty: 0 | Refills: 0 | Status: DISCONTINUED | OUTPATIENT
Start: 2019-01-29 | End: 2019-02-01

## 2019-01-29 RX ORDER — MIDAZOLAM HYDROCHLORIDE 1 MG/ML
8 INJECTION, SOLUTION INTRAMUSCULAR; INTRAVENOUS ONCE
Qty: 0 | Refills: 0 | Status: DISCONTINUED | OUTPATIENT
Start: 2019-01-29 | End: 2019-01-30

## 2019-01-29 RX ORDER — INSULIN LISPRO 100/ML
VIAL (ML) SUBCUTANEOUS AT BEDTIME
Qty: 0 | Refills: 0 | Status: DISCONTINUED | OUTPATIENT
Start: 2019-01-29 | End: 2019-01-29

## 2019-01-29 RX ORDER — INSULIN LISPRO 100/ML
9 VIAL (ML) SUBCUTANEOUS
Qty: 0 | Refills: 0 | Status: DISCONTINUED | OUTPATIENT
Start: 2019-01-29 | End: 2019-01-29

## 2019-01-29 RX ORDER — PIPERACILLIN AND TAZOBACTAM 4; .5 G/20ML; G/20ML
3.38 INJECTION, POWDER, LYOPHILIZED, FOR SOLUTION INTRAVENOUS ONCE
Qty: 0 | Refills: 0 | Status: COMPLETED | OUTPATIENT
Start: 2019-01-29 | End: 2019-01-29

## 2019-01-29 RX ORDER — ACETAMINOPHEN 500 MG
650 TABLET ORAL ONCE
Qty: 0 | Refills: 0 | Status: COMPLETED | OUTPATIENT
Start: 2019-01-29 | End: 2019-01-29

## 2019-01-29 RX ORDER — SENNA PLUS 8.6 MG/1
2 TABLET ORAL AT BEDTIME
Qty: 0 | Refills: 0 | Status: DISCONTINUED | OUTPATIENT
Start: 2019-01-29 | End: 2019-02-01

## 2019-01-29 RX ORDER — INSULIN LISPRO 100/ML
VIAL (ML) SUBCUTANEOUS EVERY 6 HOURS
Qty: 0 | Refills: 0 | Status: DISCONTINUED | OUTPATIENT
Start: 2019-01-29 | End: 2019-01-31

## 2019-01-29 RX ORDER — PIPERACILLIN AND TAZOBACTAM 4; .5 G/20ML; G/20ML
3.38 INJECTION, POWDER, LYOPHILIZED, FOR SOLUTION INTRAVENOUS EVERY 8 HOURS
Qty: 0 | Refills: 0 | Status: DISCONTINUED | OUTPATIENT
Start: 2019-01-29 | End: 2019-02-05

## 2019-01-29 RX ORDER — FUROSEMIDE 40 MG
80 TABLET ORAL ONCE
Qty: 0 | Refills: 0 | Status: COMPLETED | OUTPATIENT
Start: 2019-01-29 | End: 2019-01-29

## 2019-01-29 RX ORDER — DOCUSATE SODIUM 100 MG
100 CAPSULE ORAL
Qty: 0 | Refills: 0 | Status: DISCONTINUED | OUTPATIENT
Start: 2019-01-29 | End: 2019-02-01

## 2019-01-29 RX ADMIN — PIPERACILLIN AND TAZOBACTAM 25 GRAM(S): 4; .5 INJECTION, POWDER, LYOPHILIZED, FOR SOLUTION INTRAVENOUS at 23:24

## 2019-01-29 RX ADMIN — GABAPENTIN 400 MILLIGRAM(S): 400 CAPSULE ORAL at 00:19

## 2019-01-29 RX ADMIN — Medication 137 MICROGRAM(S): at 06:06

## 2019-01-29 RX ADMIN — SODIUM CHLORIDE 3 MILLILITER(S): 9 INJECTION INTRAMUSCULAR; INTRAVENOUS; SUBCUTANEOUS at 10:27

## 2019-01-29 RX ADMIN — SIMVASTATIN 20 MILLIGRAM(S): 20 TABLET, FILM COATED ORAL at 00:00

## 2019-01-29 RX ADMIN — GABAPENTIN 400 MILLIGRAM(S): 400 CAPSULE ORAL at 15:26

## 2019-01-29 RX ADMIN — ALBUTEROL 2.5 MILLIGRAM(S): 90 AEROSOL, METERED ORAL at 22:40

## 2019-01-29 RX ADMIN — PIPERACILLIN AND TAZOBACTAM 200 GRAM(S): 4; .5 INJECTION, POWDER, LYOPHILIZED, FOR SOLUTION INTRAVENOUS at 16:19

## 2019-01-29 RX ADMIN — OXYCODONE HYDROCHLORIDE 15 MILLIGRAM(S): 5 TABLET ORAL at 04:09

## 2019-01-29 RX ADMIN — OXYCODONE HYDROCHLORIDE 15 MILLIGRAM(S): 5 TABLET ORAL at 11:24

## 2019-01-29 RX ADMIN — Medication 1 APPLICATION(S): at 11:27

## 2019-01-29 RX ADMIN — Medication 400 MILLIGRAM(S): at 17:10

## 2019-01-29 RX ADMIN — Medication 80 MILLIGRAM(S): at 16:18

## 2019-01-29 RX ADMIN — OXYCODONE HYDROCHLORIDE 15 MILLIGRAM(S): 5 TABLET ORAL at 08:40

## 2019-01-29 RX ADMIN — Medication 6 UNIT(S): at 09:10

## 2019-01-29 RX ADMIN — Medication 3: at 12:40

## 2019-01-29 RX ADMIN — GABAPENTIN 400 MILLIGRAM(S): 400 CAPSULE ORAL at 06:06

## 2019-01-29 RX ADMIN — PANTOPRAZOLE SODIUM 40 MILLIGRAM(S): 20 TABLET, DELAYED RELEASE ORAL at 15:26

## 2019-01-29 RX ADMIN — ALBUTEROL 2.5 MILLIGRAM(S): 90 AEROSOL, METERED ORAL at 16:10

## 2019-01-29 RX ADMIN — Medication 650 MILLIGRAM(S): at 07:09

## 2019-01-29 RX ADMIN — Medication 1000 MILLIGRAM(S): at 18:00

## 2019-01-29 RX ADMIN — Medication 2: at 09:10

## 2019-01-29 RX ADMIN — ENOXAPARIN SODIUM 40 MILLIGRAM(S): 100 INJECTION SUBCUTANEOUS at 11:28

## 2019-01-29 RX ADMIN — SODIUM CHLORIDE 3 MILLILITER(S): 9 INJECTION INTRAMUSCULAR; INTRAVENOUS; SUBCUTANEOUS at 22:46

## 2019-01-29 RX ADMIN — CHLORHEXIDINE GLUCONATE 1 APPLICATION(S): 213 SOLUTION TOPICAL at 11:27

## 2019-01-29 RX ADMIN — ENOXAPARIN SODIUM 40 MILLIGRAM(S): 100 INJECTION SUBCUTANEOUS at 23:36

## 2019-01-29 RX ADMIN — Medication 10 MILLIGRAM(S): at 06:06

## 2019-01-29 RX ADMIN — INSULIN GLARGINE 22 UNIT(S): 100 INJECTION, SOLUTION SUBCUTANEOUS at 00:01

## 2019-01-29 RX ADMIN — OXYCODONE HYDROCHLORIDE 15 MILLIGRAM(S): 5 TABLET ORAL at 07:45

## 2019-01-29 RX ADMIN — ALBUTEROL 2.5 MILLIGRAM(S): 90 AEROSOL, METERED ORAL at 03:22

## 2019-01-29 RX ADMIN — Medication 650 MILLIGRAM(S): at 08:17

## 2019-01-29 RX ADMIN — Medication 25 MILLIGRAM(S): at 06:06

## 2019-01-29 RX ADMIN — AMLODIPINE BESYLATE 10 MILLIGRAM(S): 2.5 TABLET ORAL at 11:25

## 2019-01-29 RX ADMIN — Medication 6 UNIT(S): at 12:40

## 2019-01-29 RX ADMIN — OXYCODONE HYDROCHLORIDE 15 MILLIGRAM(S): 5 TABLET ORAL at 12:20

## 2019-01-29 RX ADMIN — Medication 100 MILLIGRAM(S): at 11:28

## 2019-01-29 RX ADMIN — Medication 200 MILLIGRAM(S): at 09:10

## 2019-01-29 RX ADMIN — ALBUTEROL 2.5 MILLIGRAM(S): 90 AEROSOL, METERED ORAL at 10:30

## 2019-01-29 RX ADMIN — Medication 100 MILLIGRAM(S): at 00:01

## 2019-01-29 RX ADMIN — CHLORHEXIDINE GLUCONATE 15 MILLILITER(S): 213 SOLUTION TOPICAL at 06:07

## 2019-01-29 RX ADMIN — Medication 40 MILLIGRAM(S): at 06:06

## 2019-01-29 RX ADMIN — OXYCODONE HYDROCHLORIDE 15 MILLIGRAM(S): 5 TABLET ORAL at 04:30

## 2019-01-29 NOTE — PROGRESS NOTE ADULT - PROBLEM SELECTOR PLAN 2
vanco for enterococcus-level 20.4 trough decreased to 500mg iv  Recheck trough level before 4th dose   TLC catheter dc vanco for enterococcus-level 20.4 trough decreased to 500mg iv  Recheck trough level before 4th dose   pt with fever during night /wbc elevated 11.36   -ID recalled to see patient   -CX sent

## 2019-01-29 NOTE — PROGRESS NOTE ADULT - ASSESSMENT
63 F with PMH of COPD (on home O2 (3L), hypothyroidism, T2DM (HbA1c 8s 11/2018, on insulin), RA on prednisone (10 mg daily x 30 years), HTN,  admitted for SBO s/p ex lap with lysis of adhesions, had been intubated for hypercapnic respiratory failure, now extubated.

## 2019-01-29 NOTE — PROGRESS NOTE ADULT - SUBJECTIVE AND OBJECTIVE BOX
B-Team Surgery Progress Note     Subjective/24hour Events: patient has been febrile since yesterday noon with Tmax of 101.8 and again this morning. Patient is currently on vancomycin for enterococcus bacteremia with stop date on 01/31 per last ID note. The retention sutures in the middle of the wound were taken out as they are no longer serving any function.    Vital Signs Last 24 Hrs  Vital Signs Last 24 Hrs  T(C): 38.3 (29 Jan 2019 05:50), Max: 38.8 (28 Jan 2019 12:20)  T(F): 101 (29 Jan 2019 05:50), Max: 101.8 (28 Jan 2019 12:20)  HR: 112 (29 Jan 2019 05:50) (80 - 131)  BP: 146/68 (29 Jan 2019 05:50) (116/71 - 146/68)  BP(mean): --  RR: 20 (29 Jan 2019 05:50) (19 - 21)  SpO2: 87% (29 Jan 2019 05:50) (87% - 94%)    Physical Exam:  Gen: NAD, awake and interactive   Pulm: on high flow nasal cannula, saturating at 87%  Card: pulse regularly but tachycardic and vital sign stable per monitoring   GI: Obese, soft, non-distended, non-tender, the loose sutures in the middle were taken out with rest in place, with fibrinous exudate over granulation tissue

## 2019-01-29 NOTE — PROGRESS NOTE ADULT - PROBLEM SELECTOR PLAN 7
BS control  Lantus hs   Premeal decreased with change in diet - eval pts intake   Endo following BS control  Lantus hs   Premeal decreased with change in diet - eval pts intake   -accuchecks elevated will ask endo to see patient /eval   Endo following

## 2019-01-29 NOTE — PROGRESS NOTE ADULT - PROBLEM SELECTOR PLAN 3
O2-bipap to [facility] as able then taper to NC  Pt unable to tolerate 100%NRB for transport at this time CTA on hold O2-bipap to [facility] as able then taper to NC  Pt unable to tolerate 100%NRB for transport at this time CTA on hold as CT of abd/pelvis   -surgery aware

## 2019-01-29 NOTE — PROGRESS NOTE ADULT - SUBJECTIVE AND OBJECTIVE BOX
Follow Up:  fever, abd wound infection    Interval History: pt spiked to 101 since yesterday with increased WBC    ROS:      All other systems negative    Constitutional: no fever, no chills  Head: no trauma  Eyes: no vision changes, no eye pain  ENT:  no sore throat, no rhinorrhea  Cardiovascular:  no chest pain, no palpitation  Respiratory:  no SOB, occasional cough  GI:  no abd pain, no vomiting, no diarrhea  urinary: no dysuria, no hematuria, no flank pain  musculoskeletal:  no joint pain, no joint swelling  skin:  no rash  neurology:  no headache, no seizure, no change in mental status  psych: no anxiety, no depression         Allergies  No Known Allergies        ANTIMICROBIALS:  piperacillin/tazobactam IVPB. 3.375 once  piperacillin/tazobactam IVPB. 3.375 every 8 hours  vancomycin  IVPB    vancomycin  IVPB 500 every 12 hours      OTHER MEDS:  ALBUTerol    0.083% 2.5 milliGRAM(s) Nebulizer every 6 hours  amLODIPine   Tablet 10 milliGRAM(s) Oral daily  buDESOnide 160 MICROgram(s)/formoterol 4.5 MICROgram(s) Inhaler 2 Puff(s) Inhalation two times a day  chlorhexidine 0.12% Liquid 15 milliLiter(s) Swish and Spit two times a day  chlorhexidine 4% Liquid 1 Application(s) Topical <User Schedule>  collagenase Ointment 1 Application(s) Topical daily  docusate sodium 100 milliGRAM(s) Oral two times a day  enoxaparin Injectable 40 milliGRAM(s) SubCutaneous two times a day  furosemide   Injectable 40 milliGRAM(s) IV Push daily  furosemide   Injectable 80 milliGRAM(s) IV Push once  gabapentin 400 milliGRAM(s) Oral three times a day  guaiFENesin   Syrup  (Sugar-Free) 200 milliGRAM(s) Oral every 6 hours PRN  HYDROmorphone  Injectable 1 milliGRAM(s) IV Push every 4 hours PRN  influenza   Vaccine 0.5 milliLiter(s) IntraMuscular once  insulin glargine Injectable (LANTUS) 22 Unit(s) SubCutaneous at bedtime  insulin lispro (HumaLOG) corrective regimen sliding scale   SubCutaneous three times a day before meals  insulin lispro (HumaLOG) corrective regimen sliding scale   SubCutaneous at bedtime  insulin lispro Injectable (HumaLOG) 9 Unit(s) SubCutaneous three times a day before meals  levothyroxine 137 MICROGram(s) Oral daily  metoprolol tartrate 25 milliGRAM(s) Enteral Tube two times a day  naloxone Injectable 0.1 milliGRAM(s) IV Push every 3 minutes PRN  oxyCODONE    Solution 10 milliGRAM(s) Oral every 3 hours PRN  oxyCODONE    Solution 15 milliGRAM(s) Oral every 3 hours PRN  pantoprazole    Tablet 40 milliGRAM(s) Oral before breakfast  polyethylene glycol 3350 17 Gram(s) Oral daily  predniSONE   Tablet 10 milliGRAM(s) Oral daily  senna 2 Tablet(s) Oral at bedtime  simvastatin 20 milliGRAM(s) Oral at bedtime  sodium chloride 3%  Inhalation 3 milliLiter(s) Inhalation two times a day  traZODone 50 milliGRAM(s) Oral at bedtime      Vital Signs Last 24 Hrs  T(C): 37.1 (29 Jan 2019 11:17), Max: 38.3 (29 Jan 2019 05:50)  T(F): 98.7 (29 Jan 2019 11:17), Max: 101 (29 Jan 2019 05:50)  HR: 110 (29 Jan 2019 11:17) (102 - 127)  BP: 116/71 (29 Jan 2019 11:17) (116/71 - 146/68)  BP(mean): --  RR: 20 (29 Jan 2019 11:17) (20 - 21)  SpO2: 91% (29 Jan 2019 11:17) (87% - 91%)    Physical Exam:  General:    NAD,  non toxi  Head: atraumatic, normocephalic  Eye: normal sclera and conjunctiva  ENT:    no oropharyngeal lesions,   no LAD,   neck supple  Cardio:     regular S1, S2,  no murmur  Respiratory:  on high flow  clear b/l,    no wheezing  abd:     soft,   BS +,   abd wound with purulent drainage  :   no CVAT,  no suprapubic tenderness,   no  iraheta  Musculoskeletal:   no joint swelling,   no edema  vascular: pain at the R hand IV, normal pulses  Skin:   hyperpigmented blister on L leg but not tender   Neurologic:     no focal deficit  psych: normal affect                          9.3    11.36 )-----------( 402      ( 29 Jan 2019 08:48 )             31.7       01-29    141  |  97<L>  |  16  ----------------------------<  227<H>  4.2   |  32<H>  |  0.97    Ca    9.3      29 Jan 2019 08:48  Phos  3.8     01-29  Mg     1.9     01-29            MICROBIOLOGY:  v  BLOOD VENOUS  01-17-19 --  --  --      OTHER  01-12-19 --  --  Enterococcus faecalis  Corynebacterium species      BLOOD PERIPHERAL  01-11-19 --  --  BLOOD CULTURE PCR  Enterococcus faecium      BLOOD VENOUS  01-11-19 --  --  --      BRONCHIAL LAVAGE  01-09-19 --  --  --      SPUTUM  01-02-19 --  --  --      BLOOD  01-01-19 --  --  --      BLOOD  12-31-18 --  --  --                RADIOLOGY:  Images below reviewed personally  < from: Xray Chest 1 View- PORTABLE-Urgent (01.26.19 @ 17:39) >  IMPRESSION: Enteric tube has its tip in the stomach. There is a left IJ   line with the tip at the right atrial superior vena caval junction. There   are bilateral areas of airspace disease with somewhat of a peripheral   distribution which may have slightly improved on the left since the prior   study. There is no pneumothorax.      < from: CT Abdomen and Pelvis w/ Oral Cont and w/ IV Cont (01.12.19 @ 11:24) >  IMPRESSION:   Ventral midline diastasis and open wound without evidence for discrete   abscess or drainable collection.    Multifocal pneumonia, with increased consolidation in the right middle   and left lower lobes since 1/2/2018.

## 2019-01-29 NOTE — CHART NOTE - NSCHARTNOTEFT_GEN_A_CORE
Spoke with Dr Poole from ID who will see patient for ID consult regarding new fevers /wbc elevated and draining abdominal wound.

## 2019-01-29 NOTE — PROGRESS NOTE ADULT - PROBLEM SELECTOR PLAN 1
multifactorial--obesity, asthma, osas, ARDS  - Increase mobility attempt oob , sitting in chair position   -PT multifactorial--obesity, asthma, osas, ARDS  - Increase mobility attempt oob , sitting in chair position   -PT saw patient today attempted OOB

## 2019-01-29 NOTE — CHART NOTE - NSCHARTNOTEFT_GEN_A_CORE
R[facility] Pulmonary fellow note      Patient decompensated in RCU, becoming more hypoxic despite increasing FiO2, use of high flow nasal cannula and non-invasive positive pressure ventilation.     After desaturation to as low as 69% with increased work of breathing, decision was made to intubate, however in the time between that decision and the time that the team was ready to intubate, the patient's respiratory status had improved. This was likely due to furosemide and positive pressure ventilation.   She was not intubated and was transferred to MICU on bipap after receiving 80mg furosemide IV    Vital Signs Last 24 Hrs  T(C): 37.4 (29 Jan 2019 17:47), Max: 38.3 (29 Jan 2019 05:50)  T(F): 99.4 (29 Jan 2019 17:47), Max: 101 (29 Jan 2019 05:50)  HR: 123 (29 Jan 2019 17:47) (102 - 127)  BP: 181/154 (29 Jan 2019 17:47) (101/58 - 181/154)  BP(mean): 159 (29 Jan 2019 17:47) (72 - 159)  RR: 26 (29 Jan 2019 17:47) (20 - 26)  SpO2: 84% (29 Jan 2019 17:47) (84% - 91%)    Bedside ultrasound showed no leg DVT  Normal LV systolic function  Normal RV systolic function  Normal interventricular septum.  Trace left pleural effusion with bilateral B line pattern      - Overall, respiratory distress is consistent with cardiogenic pulmonary edema. No signs of VTE.   - Concern for abscess or fluid collection related to her abdominal wound given her persistent fever - she was not stable to go for CT earlier    Patricio Camacho MD  Pulmonary/Critical Care Fellow

## 2019-01-29 NOTE — PROGRESS NOTE ADULT - ASSESSMENT
63F w/ multiple respiratory comorbidities p/w SBO s/p ex-lap with findings of healthy bowel c/b evisceration of bowel requiring placement of retention sutures, difficult to wean off ventilation, MATEO done 01/23 to evaluate endocarditis, patient has been extubated and on Hi-Ted. Patient has been febrile since yesterday noon and tachycardic. Bedside US was performed yesterday showed no DVT.     Plan  - The loose retention sutures in the middle were taken out   - Patient on vancomycin for enterococcal bacteremia, but has been febrile x 2 and tachycardic, consider ID reconsult and send blood cultures.   - Continue BID dressing changes to midline wound.   - Surgery will continue to follow 63F w/ multiple respiratory comorbidities p/w SBO s/p ex-lap with findings of healthy bowel c/b evisceration of bowel requiring placement of retention sutures, difficult to wean off ventilation, MATEO done 01/23 to evaluate endocarditis, patient has been extubated and on Hi-Ted. Patient has been febrile since yesterday noon and tachycardic. Bedside US was performed yesterday showed no DVT.     Plan  - The loose retention sutures in the middle were taken out   - Patient on vancomycin for enterococcal bacteremia, but has been febrile x 2 and tachycardic, consider ID reconsult and send blood cultures.   - Recommend CT chest, abdomen and pelvis with IV contrast to rule out PE or intraabdominal causes   - Continue BID dressing changes to midline wound.   - Surgery will continue to follow

## 2019-01-29 NOTE — PROGRESS NOTE ADULT - SUBJECTIVE AND OBJECTIVE BOX
CHIEF COMPLAINT:    Interval Events:    REVIEW OF SYSTEMS:  Constitutional:   Eyes:  ENT:  CV:  Resp:  GI:  :  MSK:  Integumentary:  Neurological:  Psychiatric:  Endocrine:  Hematologic/Lymphatic:  Allergic/Immunologic:  [ ] All other systems negative  [ ] Unable to assess ROS because ________    OBJECTIVE:  ICU Vital Signs Last 24 Hrs  T(C): 38.3 (29 Jan 2019 05:50), Max: 38.8 (28 Jan 2019 12:20)  T(F): 101 (29 Jan 2019 05:50), Max: 101.8 (28 Jan 2019 12:20)  HR: 112 (29 Jan 2019 05:50) (80 - 131)  BP: 146/68 (29 Jan 2019 05:50) (116/71 - 146/68)  BP(mean): --  ABP: --  ABP(mean): --  RR: 20 (29 Jan 2019 05:50) (19 - 21)  SpO2: 87% (29 Jan 2019 05:50) (87% - 94%)    Mode: NIV (Noninvasive Ventilation), RR (machine): 18, TV (machine): 492, FiO2: 85, PEEP: 5, ITime: 0.9, MAP: 10, PIP: 30    CAPILLARY BLOOD GLUCOSE      POCT Blood Glucose.: 145 mg/dL (28 Jan 2019 23:46)      PHYSICAL EXAM:  General:   HEENT:   Lymph Nodes:  Neck:   Respiratory:   Cardiovascular:   Abdomen:   Extremities:   Skin:   Neurological:  Psychiatry:    HOSPITAL MEDICATIONS:  MEDICATIONS  (STANDING):  ALBUTerol    0.083% 2.5 milliGRAM(s) Nebulizer every 6 hours  amLODIPine   Tablet 10 milliGRAM(s) Oral daily  buDESOnide 160 MICROgram(s)/formoterol 4.5 MICROgram(s) Inhaler 2 Puff(s) Inhalation two times a day  chlorhexidine 0.12% Liquid 15 milliLiter(s) Swish and Spit two times a day  chlorhexidine 4% Liquid 1 Application(s) Topical <User Schedule>  collagenase Ointment 1 Application(s) Topical daily  docusate sodium Liquid 100 milliGRAM(s) Oral two times a day  enoxaparin Injectable 40 milliGRAM(s) SubCutaneous two times a day  furosemide   Injectable 40 milliGRAM(s) IV Push daily  gabapentin   Solution 400 milliGRAM(s) Oral three times a day  influenza   Vaccine 0.5 milliLiter(s) IntraMuscular once  insulin glargine Injectable (LANTUS) 22 Unit(s) SubCutaneous at bedtime  insulin lispro (HumaLOG) corrective regimen sliding scale   SubCutaneous three times a day before meals  insulin lispro Injectable (HumaLOG) 6 Unit(s) SubCutaneous three times a day before meals  levothyroxine 137 MICROGram(s) Oral daily  metoprolol tartrate 25 milliGRAM(s) Enteral Tube two times a day  pantoprazole   Suspension 40 milliGRAM(s) Oral daily  polyethylene glycol 3350 17 Gram(s) Oral daily  predniSONE   Tablet 10 milliGRAM(s) Oral daily  senna Syrup 10 milliLiter(s) Oral at bedtime  simvastatin 20 milliGRAM(s) Oral at bedtime  sodium chloride 3%  Inhalation 3 milliLiter(s) Inhalation two times a day  vancomycin  IVPB      vancomycin  IVPB 500 milliGRAM(s) IV Intermittent every 12 hours    MEDICATIONS  (PRN):  guaiFENesin   Syrup  (Sugar-Free) 200 milliGRAM(s) Oral every 6 hours PRN Cough  HYDROmorphone  Injectable 1 milliGRAM(s) IV Push every 4 hours PRN Severe Pain (7 - 10)  naloxone Injectable 0.1 milliGRAM(s) IV Push every 3 minutes PRN For ANY of the following changes in patient status:  A. RR LESS THAN 10 breaths per minute, B. Oxygen saturation LESS THAN 90%, C. Sedation score of 6  oxyCODONE    Solution 10 milliGRAM(s) Oral every 3 hours PRN Moderate Pain (4 - 6)  oxyCODONE    Solution 15 milliGRAM(s) Oral every 3 hours PRN Severe Pain (7 - 10)  traZODone 50 milliGRAM(s) Oral at bedtime PRN Insomnia      LABS:                        9.1    9.85  )-----------( 377      ( 28 Jan 2019 14:55 )             30.6     01-28    143  |  97<L>  |  21  ----------------------------<  230<H>  4.2   |  33<H>  |  0.86    Ca    9.4      28 Jan 2019 14:55                MICROBIOLOGY:     RADIOLOGY:  [ ] Reviewed and interpreted by me    PULMONARY FUNCTION TESTS:    EKG: CHIEF COMPLAINT: Patient is a 63y old  Female who presents with a chief complaint of SBO, respiratory failure (29 Jan 2019 06:43)      Interval Events: Fever x 2     REVIEW OF SYSTEMS:  Constitutional: + post op pain   CV: Denies   Resp: Denies   GI: tolerating diet   MSK:feels weak   [ ] Unable to assess ROS because ________    OBJECTIVE:  ICU Vital Signs Last 24 Hrs  T(C): 38.3 (29 Jan 2019 05:50), Max: 38.8 (28 Jan 2019 12:20)  T(F): 101 (29 Jan 2019 05:50), Max: 101.8 (28 Jan 2019 12:20)  HR: 112 (29 Jan 2019 05:50) (80 - 131)  BP: 146/68 (29 Jan 2019 05:50) (116/71 - 146/68)  BP(mean): --  ABP: --  ABP(mean): --  RR: 20 (29 Jan 2019 05:50) (19 - 21)  SpO2: 87% (29 Jan 2019 05:50) (87% - 94%)    Mode: NIV (Noninvasive Ventilation), RR (machine): 18, TV (machine): 492, FiO2: 85, PEEP: 5, ITime: 0.9, MAP: 10, PIP: 30    CAPILLARY BLOOD GLUCOSE      POCT Blood Glucose.: 145 mg/dL (28 Jan 2019 23:46)          HOSPITAL MEDICATIONS:  MEDICATIONS  (STANDING):  ALBUTerol    0.083% 2.5 milliGRAM(s) Nebulizer every 6 hours  amLODIPine   Tablet 10 milliGRAM(s) Oral daily  buDESOnide 160 MICROgram(s)/formoterol 4.5 MICROgram(s) Inhaler 2 Puff(s) Inhalation two times a day  chlorhexidine 0.12% Liquid 15 milliLiter(s) Swish and Spit two times a day  chlorhexidine 4% Liquid 1 Application(s) Topical <User Schedule>  collagenase Ointment 1 Application(s) Topical daily  docusate sodium Liquid 100 milliGRAM(s) Oral two times a day  enoxaparin Injectable 40 milliGRAM(s) SubCutaneous two times a day  furosemide   Injectable 40 milliGRAM(s) IV Push daily  gabapentin   Solution 400 milliGRAM(s) Oral three times a day  influenza   Vaccine 0.5 milliLiter(s) IntraMuscular once  insulin glargine Injectable (LANTUS) 22 Unit(s) SubCutaneous at bedtime  insulin lispro (HumaLOG) corrective regimen sliding scale   SubCutaneous three times a day before meals  insulin lispro Injectable (HumaLOG) 6 Unit(s) SubCutaneous three times a day before meals  levothyroxine 137 MICROGram(s) Oral daily  metoprolol tartrate 25 milliGRAM(s) Enteral Tube two times a day  pantoprazole   Suspension 40 milliGRAM(s) Oral daily  polyethylene glycol 3350 17 Gram(s) Oral daily  predniSONE   Tablet 10 milliGRAM(s) Oral daily  senna Syrup 10 milliLiter(s) Oral at bedtime  simvastatin 20 milliGRAM(s) Oral at bedtime  sodium chloride 3%  Inhalation 3 milliLiter(s) Inhalation two times a day  vancomycin  IVPB      vancomycin  IVPB 500 milliGRAM(s) IV Intermittent every 12 hours    MEDICATIONS  (PRN):  guaiFENesin   Syrup  (Sugar-Free) 200 milliGRAM(s) Oral every 6 hours PRN Cough  HYDROmorphone  Injectable 1 milliGRAM(s) IV Push every 4 hours PRN Severe Pain (7 - 10)  naloxone Injectable 0.1 milliGRAM(s) IV Push every 3 minutes PRN For ANY of the following changes in patient status:  A. RR LESS THAN 10 breaths per minute, B. Oxygen saturation LESS THAN 90%, C. Sedation score of 6  oxyCODONE    Solution 10 milliGRAM(s) Oral every 3 hours PRN Moderate Pain (4 - 6)  oxyCODONE    Solution 15 milliGRAM(s) Oral every 3 hours PRN Severe Pain (7 - 10)  traZODone 50 milliGRAM(s) Oral at bedtime PRN Insomnia      LABS:                        9.1    9.85  )-----------( 377      ( 28 Jan 2019 14:55 )             30.6     01-28    143  |  97<L>  |  21  ----------------------------<  230<H>  4.2   |  33<H>  |  0.86    Ca    9.4      28 Jan 2019 14:55                MICROBIOLOGY:     RADIOLOGY:  [ ] Reviewed and interpreted by me    PULMONARY FUNCTION TESTS:    EKG:

## 2019-01-29 NOTE — PROGRESS NOTE ADULT - PROBLEM SELECTOR PLAN 1
Patient changed to PO diet.  Started on lower Humalog 6/6/6 to assess for po tolerance but BG running in 200s.  Would continue Lantus 22 units at bedtime.  Increase Humalog to 9 units premeal. Hold if NPO.  Low Humalog scale premeal and low bedtime scale.  Goal glucose 100-180

## 2019-01-29 NOTE — PROGRESS NOTE ADULT - ASSESSMENT
63 f with DM, COPD, RA on chronic steroids, presented initially with SBO, underwent ex lap 12/15 closed with 4 retention sutures, evicerated on 12/30 and closed with 6 more retention sutures, c/b multifocal pneumonia, developed hypercapnic respiratory failure requiring intubation 1/6, s/p bronch 1/9/18, c/b Enterococci bacteremia, extubated and transferred to floor, still on vanco but developed fever and elevated WBC    new fever due to abd wound infection vs pneumonia or phlebitis  enterococcal bacteremia cleared on vanco      * remove the L PIV  * blood cx x 2  * chest/abd/pelvis CT with contrast  * c/w vanco 500 q 12 and monitor the trough  * add zosyn 3.375 q 8

## 2019-01-29 NOTE — PROGRESS NOTE ADULT - PROBLEM SELECTOR PLAN 2
Continue levothyroxine 137 mcg oral daily as the dose was increased from 125 on this admission due to elevated TSH.

## 2019-01-29 NOTE — PROGRESS NOTE ADULT - PROBLEM SELECTOR PLAN 6
nutrition evaluation  Seen by speech and puree diet with nectar thick liquids nutrition evaluation  Seen by speech and brianna diet with nectar thick liquids -  -Nutrition consult regarding supplements

## 2019-01-29 NOTE — PROGRESS NOTE ADULT - ATTENDING COMMENTS
Febrile.  TLC removed yesterday.  Blood cultures, UA, CXR  CTA ordered yesterday, but was unable to be performed , pt desaturated with movement  LE ultrasound negative for DVT  ID follow up appreciated.  zosyn added to vanco.  Diurese - lasix 80 now and monitor response, O2 requirements  surgical/wound follow up appreciated Febrile.  TLC removed yesterday.  Blood cultures - attempting to get, multiple unsuccessful attempts, and then pt refused for a few hours, UA, CXR  CTA ordered yesterday, but was unable to be performed , pt desaturated with movement  LE ultrasound negative for DVT  ID follow up appreciated.  zosyn added to vanco.  Diurese - lasix 80 now and monitor response, O2 requirements  surgical/wound follow up appreciated    If CXR shows improvement in parenchymal process and no respone to lasix, will consider empiric AC.  d/w surgery as well

## 2019-01-29 NOTE — CHART NOTE - NSCHARTNOTEFT_GEN_A_CORE
MICU Accept Note. MICU Accept Note.    64 y/o female with PMH of HTN, DM, hypothyroidism, RA (10mg prednisone), pulmonary HTN, CHRIS, and COPD (3L at home) who was originally admitted on 12/15 for SBO. Underwent ex lap wit lysis of adhesions on 12/16 after which time she was transferred to the SICU for delayed extubation. Was transferred to the floors after extubation when on 12/30 she eviscerated 2/2 a coughing episode, went her second ex lap and again returned to the SICU for delayed extubation. Of note, 10 to 12 retention sutures were placed over the course of the two surgeries. Was then transferred to RCU for multifocal pneumonia where she was placed on BIPAP. There developed ARDS and returned to the SICU where she was re-intubated on 1/6. She has since failed multiple attempts at extubation and both the pt and family (Son, [place], is healthcare proxy) are reportedly refusing trach.     Her hospital course was complicated by E. Faecium bacteremia for which she has been on Vancomycin. Blood cultures cleared on 1/17; however she spiked fever on 1/29 in RCU and Zosyn was subsequently added. In RCU, patient requiring HiFlo during day and AVAPS qHS; however developed increased work of breathing and desaturated to 67%. Patient was given Lasix 80 IVP with improvement in respiratory status and transferred to MICU on BiPaP for closer monitoring.     Assessment/Plan    64 y/o Female hx of COPD presented initially with SBO, underwent ex lap 12/15 closed with 4 retention sutures, eviscerated on 12/30 and closed with 6 more retention sutures, c/b multifocal pneumonia, developed hypercapnic respiratory failure requiring intubation 1/6, s/p bronch 1/9/18, and extubated on 1/24, course further c/b Enterococci bacteremia now readmitted to MICU for hypoxic respiratory failure.    #Neuro    #Respiratory   -Hypoxic Respiratory Failure; continue with BiLevel  -Diurese to maintain net negative.     #CV    #GI    #Renal/Metabolic    #ID    #Heme    #Endocrine    #DVT ppx    #Skin    Lars Gonzalez D.O.  PGY-2 EM/IM  Pager #[phone] MICU Accept Note.    64 y/o female with PMH of HTN, DM, hypothyroidism, RA (10mg prednisone), pulmonary HTN, CHRIS, and COPD (3L at home) who was originally admitted on 12/15 for SBO. Underwent ex lap wit lysis of adhesions on 12/16 after which time she was transferred to the SICU for delayed extubation. Was transferred to the floors after extubation when on 12/30 she eviscerated 2/2 a coughing episode, went her second ex lap and again returned to the SICU for delayed extubation. Of note, 10 to 12 retention sutures were placed over the course of the two surgeries. Was then transferred to RCU for multifocal pneumonia where she was placed on BIPAP. There developed ARDS and returned to the SICU where she was re-intubated on 1/6. She has since failed multiple attempts at extubation and both the pt and family (Son, [place], is healthcare proxy) are reportedly refusing trach.     Her hospital course was complicated by E. Faecium bacteremia for which she has been on Vancomycin. Blood cultures cleared on 1/17; however she spiked fever on 1/29 in RCU and Zosyn was subsequently added. In RCU, patient requiring HiFlo during day and AVAPS qHS; however developed increased work of breathing and desaturated to 67%. Patient was given Lasix 80 IVP with improvement in respiratory status and transferred to MICU on BiPaP for closer monitoring.     Assessment/Plan    64 y/o Female hx of COPD presented initially with SBO, underwent ex lap 12/15 closed with 4 retention sutures, eviscerated on 12/30 and closed with 6 more retention sutures, c/b multifocal pneumonia, developed hypercapnic respiratory failure requiring intubation 1/6, s/p bronch 1/9/18, and extubated on 1/24, course further c/b Enterococci bacteremia now readmitted to MICU for hypoxic respiratory failure.    #Neuro    #Respiratory   -Hypoxic Respiratory Failure; continue with BiLevel  -Diurese to maintain net negative.     #CV    #GI    #Renal/Metabolic    #ID  -c/w Vancomycin & Zosyn; ID follow up appreciated.     #Heme    #Endocrine  -c/w Lantus & SSI    #DVT ppx  -Lovenox     #Skin    Lars Gonzalez D.O.  PGY-2 EM/IM  Pager #[phone] MICU Accept Note.    62 y/o female with PMH of HTN, DM, hypothyroidism, RA (10mg prednisone), pulmonary HTN, CHRIS, and COPD (3L at home) who was originally admitted on 12/15 for SBO. Underwent ex lap wit lysis of adhesions on 12/16 after which time she was transferred to the SICU for delayed extubation. Was transferred to the floors after extubation when on 12/30 she eviscerated 2/2 a coughing episode, went her second ex lap and again returned to the SICU for delayed extubation. Of note, 10 to 12 retention sutures were placed over the course of the two surgeries. Was then transferred to RCU for multifocal pneumonia where she was placed on BIPAP. There developed ARDS and returned to the SICU where she was re-intubated on 1/6. She has since failed multiple attempts at extubation and both the pt and family (Son, [place], is healthcare proxy) are reportedly refusing trach.     Her hospital course was complicated by E. Faecium bacteremia for which she has been on Vancomycin. Blood cultures cleared on 1/17; however she spiked fever on 1/29 in RCU and Zosyn was subsequently added. In RCU, patient requiring HiFlo during day and AVAPS qHS; however developed increased work of breathing and desaturated to 67%. Patient was given Lasix 80 IVP with improvement in respiratory status and transferred to MICU on BiPaP for closer monitoring.     Assessment/Plan    62 y/o Female hx of COPD presented initially with SBO, underwent ex lap 12/15 closed with 4 retention sutures, eviscerated on 12/30 and closed with 6 more retention sutures, c/b multifocal pneumonia, developed hypercapnic respiratory failure requiring intubation 1/6, s/p bronch 1/9/18, and extubated on 1/24, course further c/b Enterococci bacteremia now readmitted to MICU for hypoxic respiratory failure.    #Neuro  -no active issues    #Respiratory   -Hypoxic Respiratory Failure; continue with BiLevel  -Diurese to maintain net negative.   -serial ABG    #CV  -continue home antihypertensives    #GI  NPO for now; continue feeds when oxygen requirement stable.     #Renal/Metabolic  -monitor for electrolyte derangements while receiving diuretics.     #ID  -c/w Vancomycin & Zosyn; ID follow up appreciated.     #Heme    #Endocrine  -c/w Lantus & SSI    #DVT ppx  -Lovenox     #Skin  -PIV access.     Lars Gonzalez D.O.  PGY-2 EM/IM  Pager #[phone] MICU Accept Note.    62 y/o female with PMH of HTN, DM, hypothyroidism, RA (10mg prednisone), pulmonary HTN, CHRIS, and COPD (3L at home) who was originally admitted on 12/15 for SBO. Underwent ex lap wit lysis of adhesions on 12/16 after which time she was transferred to the SICU for delayed extubation. Was transferred to the floors after extubation when on 12/30 she eviscerated 2/2 a coughing episode, went her second ex lap and again returned to the SICU for delayed extubation. Of note, 10 to 12 retention sutures were placed over the course of the two surgeries. Was then transferred to RCU for multifocal pneumonia where she was placed on BIPAP. There developed ARDS and returned to the SICU where she was re-intubated on 1/6. She has since failed multiple attempts at extubation and both the pt and family (Son, [place], is healthcare proxy) are reportedly refusing trach, but eventually extubated on 1/24.     Her hospital course was complicated by E. Faecium bacteremia for which she has been on Vancomycin. Blood cultures cleared on 1/17; however she spiked fever on 1/29 in RCU and Zosyn was subsequently added. In RCU, patient requiring HiFlo during day and AVAPS qHS; however developed increased work of breathing and desaturated to 67%. Patient was given Lasix 80 IVP with improvement in respiratory status and transferred to MICU on BiPaP for closer monitoring.     Assessment/Plan    62 y/o Female hx of COPD presented initially with SBO, underwent ex lap 12/15 closed with 4 retention sutures, eviscerated on 12/30 and closed with 6 more retention sutures, c/b multifocal pneumonia, developed hypercapnic respiratory failure requiring intubation 1/6, s/p bronch 1/9/18, and extubated on 1/24, course further c/b Enterococci bacteremia now readmitted to MICU for hypoxic respiratory failure.    #Neuro  -no active issues    #Respiratory   -Hypoxic Respiratory Failure; continue with BiLevel  -Diurese to maintain net negative.   -serial ABG    #CV  -continue home antihypertensives    #GI  NPO for now; continue feeds when oxygen requirement stable.     #Renal/Metabolic  -monitor for electrolyte derangements while receiving diuretics.     #ID  -c/w Vancomycin & Zosyn; ID follow up appreciated.     #Heme    #Endocrine  -c/w Lantus & SSI    #DVT ppx  -Lovenox     #Skin  -PIV access.     Lars Gonzalez D.O.  PGY-2 EM/IM  Pager #[phone] MICU Accept Note.    62 y/o female with PMH of HTN, DM, hypothyroidism, RA (10mg prednisone), pulmonary HTN, CHRIS, and COPD (3L at home) who was originally admitted on 12/15 for SBO. Underwent ex lap wit lysis of adhesions on 12/16 after which time she was transferred to the SICU for delayed extubation. Was transferred to the floors after extubation when on 12/30 she eviscerated 2/2 a coughing episode, went her second ex lap and again returned to the SICU for delayed extubation. Of note, 10 to 12 retention sutures were placed over the course of the two surgeries. Was then transferred to RCU for multifocal pneumonia where she was placed on BIPAP. There developed ARDS and returned to the SICU where she was re-intubated on 1/6. She has since failed multiple attempts at extubation and both the pt and family (Son, [place], is healthcare proxy) are reportedly refusing trach, but eventually extubated on 1/24.     Her hospital course was complicated by E. Faecium bacteremia for which she has been on Vancomycin. Blood cultures cleared on 1/17; however she spiked fever on 1/29 in RCU and Zosyn was subsequently added. In RCU, patient requiring HiFlo during day and AVAPS qHS; however developed increased work of breathing and desaturated to 67%. Patient was given Lasix 80 IVP with improvement in respiratory status and transferred to MICU on BiPaP for closer monitoring.     Assessment/Plan    62 y/o Female hx of COPD presented initially with SBO, underwent ex lap 12/15 closed with 4 retention sutures, eviscerated on 12/30 and closed with 6 more retention sutures, c/b multifocal pneumonia, developed hypercapnic respiratory failure requiring intubation 1/6, s/p bronch 1/9/18, and extubated on 1/24, course further c/b Enterococci bacteremia now readmitted to MICU for hypoxic respiratory failure.    #Neuro  -no active issues    #Respiratory   -Hypoxic Respiratory Failure; continue with BiLevel  -Diurese to maintain net negative.   -serial ABG  -c/w Spiriva & Symbicort. Duoneb PRN for COPD    #CV  -continue home antihypertensives    #GI  NPO for now; continue feeds when oxygen requirement stable.     #Renal/Metabolic  -monitor for electrolyte derangements while receiving diuretics.     #ID  -c/w Vancomycin & Zosyn; ID follow up appreciated.       #Endocrine  -c/w Lantus & SSI  -c/w Prednisone for RA     #DVT ppx  -Lovenox     #Skin  -PIV access.     Lars Gonzalez D.O.  PGY-2 EM/IM  Pager #[phone] MICU Accept Note.    64 y/o female with PMH of HTN, DM, hypothyroidism, RA (10mg prednisone), pulmonary HTN, CHRIS, and COPD (3L at home) who was originally admitted on 12/15 for SBO. Underwent ex lap wit lysis of adhesions on 12/16 after which time she was transferred to the SICU for delayed extubation. Was transferred to the floors after extubation when on 12/30 she eviscerated 2/2 a coughing episode, went her second ex lap and again returned to the SICU for delayed extubation. Of note, 10 to 12 retention sutures were placed over the course of the two surgeries. Was then transferred to RCU for multifocal pneumonia where she was placed on BIPAP. There developed ARDS and returned to the SICU where she was re-intubated on 1/6. She has since failed multiple attempts at extubation and both the pt and family (Son, [place], is healthcare proxy) are reportedly refusing trach, but eventually extubated on 1/24.     Her hospital course was complicated by E. Faecium bacteremia for which she has been on Vancomycin. Blood cultures cleared on 1/17; however she spiked fever on 1/29 in RCU and Zosyn was subsequently added. In RCU, patient requiring HiFlo during day and AVAPS qHS; however developed increased work of breathing and desaturated to 67%. Patient was given Lasix 80 IVP with improvement in respiratory status and transferred to MICU on BiPaP for closer monitoring.     Assessment/Plan    64 y/o Female hx of COPD presented initially with SBO, underwent ex lap 12/15 closed with 4 retention sutures, eviscerated on 12/30 and closed with 6 more retention sutures, c/b multifocal pneumonia, developed hypercapnic respiratory failure requiring intubation 1/6, s/p bronch 1/9/18, and extubated on 1/24, course further c/b Enterococci bacteremia now readmitted to MICU for hypoxic respiratory failure.    #Neuro  -no active issues    #Respiratory   -Hypoxic Respiratory Failure; continue with BiLevel  -Diurese to maintain net negative.   -serial ABG  -c/w Spiriva & Symbicort. Duoneb PRN for COPD    #CV  -continue home antihypertensives    #GI  NPO for now; continue feeds when oxygen requirement stable.     #Renal/Metabolic  -monitor for electrolyte derangements while receiving diuretics.     #ID  -c/w Vancomycin & Zosyn; ID follow up appreciated.       #Endocrine  -c/w Lantus & SSI  -c/w Prednisone for RA     #DVT ppx  -Lovenox     #Skin  -PIV access.     Lars Gonzalez D.O.  PGY-2 EM/IM  Pager #[phone]    ccm attending    pt seen and examined as above. Pt known to icu with sepsis post expl lap  sbo, Pt with hypoxemic respiratory failure, needs bilevel/high flow oxygen.  PE Lungs dec. breath sounds, heart s1s2 abdomen obese, ext positive edema.  neuro arousable, alert.  labs noted,   pt febrile,   -suggest panculture, blood, urine,  -check follow up blood gas  -continue vanco /zosyn for enterococcus in the blood.  critically ill with sepsis and hypoxemic respiratory failure.  managed and treated 40 minutes   GALI Morel   [facility] [place]   [phone] MICU Accept Note.    62 y/o female with PMH of HTN, DM, hypothyroidism, RA (10mg prednisone), pulmonary HTN, CHRIS, and COPD (3L at home) who was originally admitted on 12/15 for SBO. Underwent ex lap wit lysis of adhesions on 12/16 after which time she was transferred to the SICU for delayed extubation. Was transferred to the floors after extubation when on 12/30 she eviscerated 2/2 a coughing episode, went her second ex lap and again returned to the SICU for delayed extubation. Of note, 10 to 12 retention sutures were placed over the course of the two surgeries. Was then transferred to RCU for multifocal pneumonia where she was placed on BIPAP. There developed ARDS and returned to the SICU where she was re-intubated on 1/6. She has since failed multiple attempts at extubation and both the pt and family (Son, [place], is healthcare proxy) are reportedly refusing trach, but eventually extubated on 1/24.     Her hospital course was complicated by E. Faecium bacteremia for which she has been on Vancomycin. Blood cultures cleared on 1/17; however she spiked fever on 1/29 in RCU and Zosyn was subsequently added. In RCU, patient requiring HiFlo during day and AVAPS qHS; however developed increased work of breathing and desaturated to 67%. Patient was given Lasix 80 IVP with improvement in respiratory status and transferred to MICU on BiPaP for closer monitoring.     Assessment/Plan    62 y/o Female hx of COPD presented initially with SBO, underwent ex lap 12/15 closed with 4 retention sutures, eviscerated on 12/30 and closed with 6 more retention sutures, c/b multifocal pneumonia, developed hypercapnic respiratory failure requiring intubation 1/6, s/p bronch 1/9/18, and extubated on 1/24, course further c/b Enterococci bacteremia now readmitted to MICU for hypoxic respiratory failure.    #Neuro  -no active issues    #Respiratory   -Hypoxic Respiratory Failure; continue with BiLevel  -Diurese to maintain net negative.   -serial ABG  -c/w Spiriva & Symbicort. Duoneb PRN for COPD    #CV  -continue home antihypertensives    #GI  -NPO for now; continue feeds when oxygen requirement stable.   -c/w Protonix    #Renal/Metabolic  -monitor for electrolyte derangements while receiving diuretics.     #ID  -c/w Vancomycin & Zosyn; ID follow up appreciated.   -repeat BCx  -MATEO negative/no identifiable source of bacteremia on CT a/p.    #Endocrine  -c/w Lantus & SSI  -c/w Prednisone for RA   -c/w Synthroid     #DVT ppx  -Lovenox     #Skin  -PIV access.     Lars Gonzalez D.O.  PGY-2 EM/IM  Pager #[phone]    ccm attending    pt seen and examined as above. Pt known to icu with sepsis post expl lap  sbo, Pt with hypoxemic respiratory failure, needs bilevel/high flow oxygen.  PE Lungs dec. breath sounds, heart s1s2 abdomen obese, ext positive edema.  neuro arousable, alert.  labs noted,   pt febrile,   -suggest panculture, blood, urine,  -check follow up blood gas  -continue vanco /zosyn for enterococcus in the blood.  critically ill with sepsis and hypoxemic respiratory failure.  managed and treated 40 minutes   GALI Morel   [facility]  attending   [phone] MICU Accept Note.    62 y/o female with PMH of HTN, DM, hypothyroidism, RA (10mg prednisone), pulmonary HTN, CHRIS, and COPD (3L at home) who was originally admitted on 12/15 for SBO. Underwent ex lap wit lysis of adhesions on 12/16 after which time she was transferred to the SICU for delayed extubation. Was transferred to the floors after extubation when on 12/30 she eviscerated 2/2 a coughing episode, went her second ex lap and again returned to the SICU for delayed extubation. Of note, 10 to 12 retention sutures were placed over the course of the two surgeries. Was then transferred to RCU for multifocal pneumonia where she was placed on BIPAP. There developed ARDS and returned to the SICU where she was re-intubated on 1/6. She has since failed multiple attempts at extubation and both the pt and family (Son, [place], is healthcare proxy) are reportedly refusing trach, but eventually extubated on 1/24.     Her hospital course was complicated by E. Faecium bacteremia for which she has been on Vancomycin. Blood cultures cleared on 1/17; however she spiked fever on 1/29 in RCU and Zosyn was subsequently added. In RCU, patient requiring HiFlo during day and AVAPS qHS; however developed increased work of breathing and desaturated to 67%. Patient was given Lasix 80 IVP with improvement in respiratory status and transferred to MICU on BiPaP for closer monitoring.     Assessment/Plan    62 y/o Female hx of COPD presented initially with SBO, underwent ex lap 12/15 closed with 4 retention sutures, eviscerated on 12/30 and closed with 6 more retention sutures, c/b multifocal pneumonia, developed hypercapnic respiratory failure requiring intubation 1/6, s/p bronch 1/9/18, and extubated on 1/24, course further c/b Enterococci bacteremia now readmitted to MICU for hypoxic respiratory failure.    #Neuro  -no active issues    #Respiratory   -Hypoxic Respiratory Failure; continue with BiLevel  -Diurese to maintain net negative.   -serial ABG  -c/w Spiriva & Symbicort. Duoneb PRN for COPD    #CV  -continue home antihypertensives    #GI  -NPO for now; continue feeds when oxygen requirement stable.   -c/w Protonix    #Renal/Metabolic  -monitor for electrolyte derangements while receiving diuretics.     #ID  -c/w Vancomycin & Zosyn; ID follow up appreciated.   -repeat BCx  -MATEO negative/no identifiable source of bacteremia on CT a/p from 1/12.    #Endocrine  -c/w Lantus & SSI  -c/w Prednisone for RA   -c/w Synthroid     #DVT ppx  -Lovenox     #Skin  -PIV access.     Lars Gonzalez D.O.  PGY-2 EM/IM  Pager #[phone]    ccm attending    pt seen and examined as above. Pt known to icu with sepsis post expl lap  sbo, Pt with hypoxemic respiratory failure, needs bilevel/high flow oxygen.  PE Lungs dec. breath sounds, heart s1s2 abdomen obese, ext positive edema.  neuro arousable, alert.  labs noted,   pt febrile,   -suggest panculture, blood, urine,  -check follow up blood gas  -continue vanco /zosyn for enterococcus in the blood.  critically ill with sepsis and hypoxemic respiratory failure.  managed and treated 40 minutes   GALI Morel   [facility]  attending   [phone]

## 2019-01-29 NOTE — PROGRESS NOTE ADULT - SUBJECTIVE AND OBJECTIVE BOX
Chief Complaint: DM2    History: Now on PO diet, tolerated some applesauce. Noted with hyperglycemia. No hypoglycemia.    MEDICATIONS  (STANDING):  ALBUTerol    0.083% 2.5 milliGRAM(s) Nebulizer every 6 hours  amLODIPine   Tablet 10 milliGRAM(s) Oral daily  buDESOnide 160 MICROgram(s)/formoterol 4.5 MICROgram(s) Inhaler 2 Puff(s) Inhalation two times a day  chlorhexidine 0.12% Liquid 15 milliLiter(s) Swish and Spit two times a day  chlorhexidine 4% Liquid 1 Application(s) Topical <User Schedule>  collagenase Ointment 1 Application(s) Topical daily  docusate sodium 100 milliGRAM(s) Oral two times a day  enoxaparin Injectable 40 milliGRAM(s) SubCutaneous two times a day  furosemide   Injectable 40 milliGRAM(s) IV Push daily  gabapentin 400 milliGRAM(s) Oral three times a day  influenza   Vaccine 0.5 milliLiter(s) IntraMuscular once  insulin glargine Injectable (LANTUS) 22 Unit(s) SubCutaneous at bedtime  insulin lispro (HumaLOG) corrective regimen sliding scale   SubCutaneous three times a day before meals  insulin lispro (HumaLOG) corrective regimen sliding scale   SubCutaneous at bedtime  insulin lispro Injectable (HumaLOG) 9 Unit(s) SubCutaneous three times a day before meals  levothyroxine 137 MICROGram(s) Oral daily  metoprolol tartrate 25 milliGRAM(s) Enteral Tube two times a day  pantoprazole    Tablet 40 milliGRAM(s) Oral before breakfast  piperacillin/tazobactam IVPB. 3.375 Gram(s) IV Intermittent once  piperacillin/tazobactam IVPB. 3.375 Gram(s) IV Intermittent every 8 hours  polyethylene glycol 3350 17 Gram(s) Oral daily  predniSONE   Tablet 10 milliGRAM(s) Oral daily  senna 2 Tablet(s) Oral at bedtime  simvastatin 20 milliGRAM(s) Oral at bedtime  sodium chloride 3%  Inhalation 3 milliLiter(s) Inhalation two times a day  traZODone 50 milliGRAM(s) Oral at bedtime  vancomycin  IVPB      vancomycin  IVPB 500 milliGRAM(s) IV Intermittent every 12 hours    MEDICATIONS  (PRN):  guaiFENesin   Syrup  (Sugar-Free) 200 milliGRAM(s) Oral every 6 hours PRN Cough  HYDROmorphone  Injectable 1 milliGRAM(s) IV Push every 4 hours PRN Severe Pain (7 - 10)  naloxone Injectable 0.1 milliGRAM(s) IV Push every 3 minutes PRN For ANY of the following changes in patient status:  A. RR LESS THAN 10 breaths per minute, B. Oxygen saturation LESS THAN 90%, C. Sedation score of 6  oxyCODONE    Solution 10 milliGRAM(s) Oral every 3 hours PRN Moderate Pain (4 - 6)  oxyCODONE    Solution 15 milliGRAM(s) Oral every 3 hours PRN Severe Pain (7 - 10)      Allergies    No Known Allergies    Intolerances    Seroquel (Other)    Review of Systems:    UNABLE TO OBTAIN    PHYSICAL EXAM:  VITALS: T(C): 37.1 (01-29-19 @ 11:17)  T(F): 98.7 (01-29-19 @ 11:17), Max: 101 (01-29-19 @ 05:50)  HR: 110 (01-29-19 @ 11:17) (102 - 127)  BP: 116/71 (01-29-19 @ 11:17) (116/71 - 146/68)  RR:  (20 - 21)  SpO2:  (87% - 91%)  Wt(kg): --  GENERAL: NAD, cushingoid appearance  EYES:  no lid lag, anicteric  HEENT:  Atraumatic, Normocephalic, moist mucous membranes  RESPIRATORY: nonlabored  GI: Soft, nontender, non distended      CAPILLARY BLOOD GLUCOSE      POCT Blood Glucose.: 259 mg/dL (29 Jan 2019 11:57)  POCT Blood Glucose.: 223 mg/dL (29 Jan 2019 09:03)  POCT Blood Glucose.: 187 mg/dL (29 Jan 2019 07:46)  POCT Blood Glucose.: 145 mg/dL (28 Jan 2019 23:46)  POCT Blood Glucose.: 183 mg/dL (28 Jan 2019 17:26)      01-29    141  |  97<L>  |  16  ----------------------------<  227<H>  4.2   |  32<H>  |  0.97    EGFR if : 73  EGFR if non : 63    Ca    9.3      01-29  Mg     1.9     01-29  Phos  3.8     01-29            Thyroid Function Tests:      Hemoglobin A1C, Whole Blood: 7.1 % <H> [4.0 - 5.6] (12-17-18 @ 03:50)  Hemoglobin A1C, Whole Blood: 7.3 % <H> [4.0 - 5.6] (12-16-18 @ 04:17)

## 2019-01-30 LAB
ALBUMIN SERPL ELPH-MCNC: 3.2 G/DL — LOW (ref 3.3–5)
ALP SERPL-CCNC: 96 U/L — SIGNIFICANT CHANGE UP (ref 40–120)
ALT FLD-CCNC: 14 U/L — SIGNIFICANT CHANGE UP (ref 4–33)
ANION GAP SERPL CALC-SCNC: 14 MMO/L — SIGNIFICANT CHANGE UP (ref 7–14)
AST SERPL-CCNC: 15 U/L — SIGNIFICANT CHANGE UP (ref 4–32)
B PERT DNA SPEC QL NAA+PROBE: NOT DETECTED — SIGNIFICANT CHANGE UP
BASOPHILS # BLD AUTO: 0.06 K/UL — SIGNIFICANT CHANGE UP (ref 0–0.2)
BASOPHILS NFR BLD AUTO: 0.7 % — SIGNIFICANT CHANGE UP (ref 0–2)
BILIRUB SERPL-MCNC: 0.3 MG/DL — SIGNIFICANT CHANGE UP (ref 0.2–1.2)
BUN SERPL-MCNC: 21 MG/DL — SIGNIFICANT CHANGE UP (ref 7–23)
C PNEUM DNA SPEC QL NAA+PROBE: NOT DETECTED — SIGNIFICANT CHANGE UP
CALCIUM SERPL-MCNC: 8.9 MG/DL — SIGNIFICANT CHANGE UP (ref 8.4–10.5)
CHLORIDE SERPL-SCNC: 96 MMOL/L — LOW (ref 98–107)
CO2 SERPL-SCNC: 30 MMOL/L — SIGNIFICANT CHANGE UP (ref 22–31)
CREAT SERPL-MCNC: 1.25 MG/DL — SIGNIFICANT CHANGE UP (ref 0.5–1.3)
EOSINOPHIL # BLD AUTO: 0.46 K/UL — SIGNIFICANT CHANGE UP (ref 0–0.5)
EOSINOPHIL NFR BLD AUTO: 5 % — SIGNIFICANT CHANGE UP (ref 0–6)
FLUAV H1 2009 PAND RNA SPEC QL NAA+PROBE: NOT DETECTED — SIGNIFICANT CHANGE UP
FLUAV H1 RNA SPEC QL NAA+PROBE: NOT DETECTED — SIGNIFICANT CHANGE UP
FLUAV H3 RNA SPEC QL NAA+PROBE: NOT DETECTED — SIGNIFICANT CHANGE UP
FLUAV SUBTYP SPEC NAA+PROBE: NOT DETECTED — SIGNIFICANT CHANGE UP
FLUBV RNA SPEC QL NAA+PROBE: NOT DETECTED — SIGNIFICANT CHANGE UP
GLUCOSE BLDC GLUCOMTR-MCNC: 125 MG/DL — HIGH (ref 70–99)
GLUCOSE BLDC GLUCOMTR-MCNC: 164 MG/DL — HIGH (ref 70–99)
GLUCOSE BLDC GLUCOMTR-MCNC: 189 MG/DL — HIGH (ref 70–99)
GLUCOSE BLDC GLUCOMTR-MCNC: 99 MG/DL — SIGNIFICANT CHANGE UP (ref 70–99)
GLUCOSE SERPL-MCNC: 121 MG/DL — HIGH (ref 70–99)
GRAM STN SPT: SIGNIFICANT CHANGE UP
HADV DNA SPEC QL NAA+PROBE: NOT DETECTED — SIGNIFICANT CHANGE UP
HCOV PNL SPEC NAA+PROBE: SIGNIFICANT CHANGE UP
HCT VFR BLD CALC: 31.3 % — LOW (ref 34.5–45)
HGB BLD-MCNC: 9.2 G/DL — LOW (ref 11.5–15.5)
HMPV RNA SPEC QL NAA+PROBE: NOT DETECTED — SIGNIFICANT CHANGE UP
HPIV1 RNA SPEC QL NAA+PROBE: NOT DETECTED — SIGNIFICANT CHANGE UP
HPIV2 RNA SPEC QL NAA+PROBE: NOT DETECTED — SIGNIFICANT CHANGE UP
HPIV3 RNA SPEC QL NAA+PROBE: NOT DETECTED — SIGNIFICANT CHANGE UP
HPIV4 RNA SPEC QL NAA+PROBE: NOT DETECTED — SIGNIFICANT CHANGE UP
IMM GRANULOCYTES NFR BLD AUTO: 1.5 % — SIGNIFICANT CHANGE UP (ref 0–1.5)
LYMPHOCYTES # BLD AUTO: 1.83 K/UL — SIGNIFICANT CHANGE UP (ref 1–3.3)
LYMPHOCYTES # BLD AUTO: 19.8 % — SIGNIFICANT CHANGE UP (ref 13–44)
MAGNESIUM SERPL-MCNC: 1.8 MG/DL — SIGNIFICANT CHANGE UP (ref 1.6–2.6)
MCHC RBC-ENTMCNC: 29.4 % — LOW (ref 32–36)
MCHC RBC-ENTMCNC: 29.8 PG — SIGNIFICANT CHANGE UP (ref 27–34)
MCV RBC AUTO: 101.3 FL — HIGH (ref 80–100)
MONOCYTES # BLD AUTO: 1.2 K/UL — HIGH (ref 0–0.9)
MONOCYTES NFR BLD AUTO: 13 % — SIGNIFICANT CHANGE UP (ref 2–14)
NEUTROPHILS # BLD AUTO: 5.54 K/UL — SIGNIFICANT CHANGE UP (ref 1.8–7.4)
NEUTROPHILS NFR BLD AUTO: 60 % — SIGNIFICANT CHANGE UP (ref 43–77)
NRBC # FLD: 0.18 K/UL — LOW (ref 25–125)
NRBC FLD-RTO: 2 — SIGNIFICANT CHANGE UP
PHOSPHATE SERPL-MCNC: 4.4 MG/DL — SIGNIFICANT CHANGE UP (ref 2.5–4.5)
PLATELET # BLD AUTO: 387 K/UL — SIGNIFICANT CHANGE UP (ref 150–400)
PMV BLD: 9.9 FL — SIGNIFICANT CHANGE UP (ref 7–13)
POTASSIUM SERPL-MCNC: 3.8 MMOL/L — SIGNIFICANT CHANGE UP (ref 3.5–5.3)
POTASSIUM SERPL-SCNC: 3.8 MMOL/L — SIGNIFICANT CHANGE UP (ref 3.5–5.3)
PROT SERPL-MCNC: 6.7 G/DL — SIGNIFICANT CHANGE UP (ref 6–8.3)
RBC # BLD: 3.09 M/UL — LOW (ref 3.8–5.2)
RBC # FLD: 16.9 % — HIGH (ref 10.3–14.5)
RSV RNA SPEC QL NAA+PROBE: NOT DETECTED — SIGNIFICANT CHANGE UP
RV+EV RNA SPEC QL NAA+PROBE: NOT DETECTED — SIGNIFICANT CHANGE UP
SODIUM SERPL-SCNC: 140 MMOL/L — SIGNIFICANT CHANGE UP (ref 135–145)
SPECIMEN SOURCE: SIGNIFICANT CHANGE UP
WBC # BLD: 9.23 K/UL — SIGNIFICANT CHANGE UP (ref 3.8–10.5)
WBC # FLD AUTO: 9.23 K/UL — SIGNIFICANT CHANGE UP (ref 3.8–10.5)

## 2019-01-30 PROCEDURE — 99291 CRITICAL CARE FIRST HOUR: CPT

## 2019-01-30 PROCEDURE — 99233 SBSQ HOSP IP/OBS HIGH 50: CPT

## 2019-01-30 PROCEDURE — 36600 WITHDRAWAL OF ARTERIAL BLOOD: CPT

## 2019-01-30 PROCEDURE — 93970 EXTREMITY STUDY: CPT | Mod: 26

## 2019-01-30 PROCEDURE — 76937 US GUIDE VASCULAR ACCESS: CPT | Mod: 26

## 2019-01-30 RX ORDER — ACETAMINOPHEN 500 MG
1000 TABLET ORAL ONCE
Qty: 0 | Refills: 0 | Status: COMPLETED | OUTPATIENT
Start: 2019-01-30 | End: 2019-01-30

## 2019-01-30 RX ORDER — HEPARIN SODIUM 5000 [USP'U]/ML
7500 INJECTION INTRAVENOUS; SUBCUTANEOUS EVERY 8 HOURS
Qty: 0 | Refills: 0 | Status: DISCONTINUED | OUTPATIENT
Start: 2019-01-30 | End: 2019-02-09

## 2019-01-30 RX ORDER — FUROSEMIDE 40 MG
40 TABLET ORAL DAILY
Qty: 0 | Refills: 0 | Status: DISCONTINUED | OUTPATIENT
Start: 2019-02-01 | End: 2019-02-01

## 2019-01-30 RX ORDER — HYDROMORPHONE HYDROCHLORIDE 2 MG/ML
1 INJECTION INTRAMUSCULAR; INTRAVENOUS; SUBCUTANEOUS ONCE
Qty: 0 | Refills: 0 | Status: DISCONTINUED | OUTPATIENT
Start: 2019-01-30 | End: 2019-01-30

## 2019-01-30 RX ORDER — METOPROLOL TARTRATE 50 MG
5 TABLET ORAL ONCE
Qty: 0 | Refills: 0 | Status: COMPLETED | OUTPATIENT
Start: 2019-01-30 | End: 2019-01-30

## 2019-01-30 RX ADMIN — HYDROMORPHONE HYDROCHLORIDE 1 MILLIGRAM(S): 2 INJECTION INTRAMUSCULAR; INTRAVENOUS; SUBCUTANEOUS at 19:34

## 2019-01-30 RX ADMIN — PIPERACILLIN AND TAZOBACTAM 25 GRAM(S): 4; .5 INJECTION, POWDER, LYOPHILIZED, FOR SOLUTION INTRAVENOUS at 05:42

## 2019-01-30 RX ADMIN — Medication 100 MILLIGRAM(S): at 12:27

## 2019-01-30 RX ADMIN — HYDROMORPHONE HYDROCHLORIDE 1 MILLIGRAM(S): 2 INJECTION INTRAMUSCULAR; INTRAVENOUS; SUBCUTANEOUS at 12:23

## 2019-01-30 RX ADMIN — PIPERACILLIN AND TAZOBACTAM 25 GRAM(S): 4; .5 INJECTION, POWDER, LYOPHILIZED, FOR SOLUTION INTRAVENOUS at 13:46

## 2019-01-30 RX ADMIN — HYDROMORPHONE HYDROCHLORIDE 1 MILLIGRAM(S): 2 INJECTION INTRAMUSCULAR; INTRAVENOUS; SUBCUTANEOUS at 19:50

## 2019-01-30 RX ADMIN — HYDROMORPHONE HYDROCHLORIDE 1 MILLIGRAM(S): 2 INJECTION INTRAMUSCULAR; INTRAVENOUS; SUBCUTANEOUS at 10:17

## 2019-01-30 RX ADMIN — PIPERACILLIN AND TAZOBACTAM 25 GRAM(S): 4; .5 INJECTION, POWDER, LYOPHILIZED, FOR SOLUTION INTRAVENOUS at 23:19

## 2019-01-30 RX ADMIN — CHLORHEXIDINE GLUCONATE 15 MILLILITER(S): 213 SOLUTION TOPICAL at 18:31

## 2019-01-30 RX ADMIN — Medication 2: at 18:34

## 2019-01-30 RX ADMIN — ALBUTEROL 2.5 MILLIGRAM(S): 90 AEROSOL, METERED ORAL at 03:33

## 2019-01-30 RX ADMIN — Medication 1000 MILLIGRAM(S): at 11:27

## 2019-01-30 RX ADMIN — HYDROMORPHONE HYDROCHLORIDE 1 MILLIGRAM(S): 2 INJECTION INTRAMUSCULAR; INTRAVENOUS; SUBCUTANEOUS at 14:10

## 2019-01-30 RX ADMIN — ALBUTEROL 2.5 MILLIGRAM(S): 90 AEROSOL, METERED ORAL at 22:16

## 2019-01-30 RX ADMIN — HYDROMORPHONE HYDROCHLORIDE 1 MILLIGRAM(S): 2 INJECTION INTRAMUSCULAR; INTRAVENOUS; SUBCUTANEOUS at 15:14

## 2019-01-30 RX ADMIN — Medication 1 APPLICATION(S): at 06:00

## 2019-01-30 RX ADMIN — Medication 400 MILLIGRAM(S): at 20:19

## 2019-01-30 RX ADMIN — Medication 100 MILLIGRAM(S): at 00:49

## 2019-01-30 RX ADMIN — ALBUTEROL 2.5 MILLIGRAM(S): 90 AEROSOL, METERED ORAL at 09:11

## 2019-01-30 RX ADMIN — HEPARIN SODIUM 7500 UNIT(S): 5000 INJECTION INTRAVENOUS; SUBCUTANEOUS at 13:46

## 2019-01-30 RX ADMIN — CHLORHEXIDINE GLUCONATE 1 APPLICATION(S): 213 SOLUTION TOPICAL at 12:26

## 2019-01-30 RX ADMIN — HYDROMORPHONE HYDROCHLORIDE 1 MILLIGRAM(S): 2 INJECTION INTRAMUSCULAR; INTRAVENOUS; SUBCUTANEOUS at 11:26

## 2019-01-30 RX ADMIN — SODIUM CHLORIDE 3 MILLILITER(S): 9 INJECTION INTRAMUSCULAR; INTRAVENOUS; SUBCUTANEOUS at 09:11

## 2019-01-30 RX ADMIN — ALBUTEROL 2.5 MILLIGRAM(S): 90 AEROSOL, METERED ORAL at 15:44

## 2019-01-30 RX ADMIN — CHLORHEXIDINE GLUCONATE 15 MILLILITER(S): 213 SOLUTION TOPICAL at 05:43

## 2019-01-30 RX ADMIN — Medication 2: at 13:27

## 2019-01-30 RX ADMIN — Medication 400 MILLIGRAM(S): at 10:00

## 2019-01-30 RX ADMIN — Medication 40 MILLIGRAM(S): at 05:43

## 2019-01-30 RX ADMIN — SODIUM CHLORIDE 3 MILLILITER(S): 9 INJECTION INTRAMUSCULAR; INTRAVENOUS; SUBCUTANEOUS at 22:17

## 2019-01-30 RX ADMIN — HYDROMORPHONE HYDROCHLORIDE 1 MILLIGRAM(S): 2 INJECTION INTRAMUSCULAR; INTRAVENOUS; SUBCUTANEOUS at 16:00

## 2019-01-30 RX ADMIN — HYDROMORPHONE HYDROCHLORIDE 1 MILLIGRAM(S): 2 INJECTION INTRAMUSCULAR; INTRAVENOUS; SUBCUTANEOUS at 23:24

## 2019-01-30 NOTE — CHART NOTE - NSCHARTNOTEFT_GEN_A_CORE
: Ketty Taylor MD     INDICATION: Hypoxemic Respiratory Failure    PROCEDURE:  [x ] LIMITED ECHO  [x] LIMITED CHEST  [ ] LIMITED RETROPERITONEAL  [ ] LIMITED ABDOMINAL  [ ] LIMITED DVT  [ ] NEEDLE GUIDANCE VASCULAR  [ ] NEEDLE GUIDANCE THORACENTESIS  [ ] NEEDLE GUIDANCE PARACENTESIS  [ ] NEEDLE GUIDANCE PERICARDIOCENTESIS  [ ] OTHER    FINDINGS:  Lung: Scant b-lines right anterior, improved anteriorly b-lines bilaterally. Large consolidation at left base with elevated hemidiaphragm. No PLEFs.  Cardiac: No pericardial effusion. RV<LV. Grossly normal LVSF, IVC collapsible. Intermittent COLETTE noted.     INTERPRETATION: Improved aeration pattern. Large left consolidation c/w atelectasis and likely underlying shunt physiology. Pt also likely has intermittent LVOT. Will increase beta blockers, continue diuresis, and attempt OOB with ambulation today.

## 2019-01-30 NOTE — PROGRESS NOTE ADULT - SUBJECTIVE AND OBJECTIVE BOX
INTERVAL HPI/OVERNIGHT EVENTS:    SUBJECTIVE: Patient seen and examined at bedside.     ROS:  CONSTITUTIONAL: No weakness, fevers or chills  EYES/ENT: No visual changes;  No vertigo or throat pain   NECK: No pain or stiffness  RESPIRATORY: No cough, wheezing, hemoptysis; No shortness of breath  CARDIOVASCULAR: No chest pain or palpitations  GASTROINTESTINAL: No abdominal or epigastric pain. No nausea, vomiting, or hematemesis; No diarrhea or constipation. No melena or hematochezia.  GENITOURINARY: No dysuria, frequency or hematuria  NEUROLOGICAL: No numbness or weakness  SKIN: No itching, rashes    OBJECTIVE:    VITAL SIGNS:  ICU Vital Signs Last 24 Hrs  T(C): 38.2 (2019 08:00), Max: 38.2 (2019 08:00)  T(F): 100.7 (2019 08:00), Max: 100.7 (2019 08:00)  HR: 125 (2019 08:00) (102 - 125)  BP: 111/64 (2019 08:00) (86/56 - 181/154)  BP(mean): 73 (2019 08:00) (62 - 159)  ABP: --  ABP(mean): --  RR: 30 (2019 08:00) (17 - 37)  SpO2: 87% (2019 08:00) (84% - 97%)    Mode: NIV (Noninvasive Ventilation), RR (machine): 18, TV (machine): 400, FiO2: 100, PEEP: 6, MAP: 10, PIP: 11     @ 07:01  -   @ 07:00  --------------------------------------------------------  IN: 300 mL / OUT: 550 mL / NET: -250 mL      CAPILLARY BLOOD GLUCOSE      POCT Blood Glucose.: 125 mg/dL (2019 06:10)      PHYSICAL EXAM:  GENERAL: NAD, well-developed  HEAD:  Atraumatic, Normocephalic  EYES: EOMI, PERRLA, conjunctiva and sclera clear  NECK: Supple, No JVD  CHEST/LUNG: Clear to auscultation bilaterally; No wheeze  HEART: Regular rate and rhythm; No murmurs, rubs, or gallops  ABDOMEN: Soft, Nontender, Nondistended; Bowel sounds present  EXTREMITIES:  2+ Peripheral Pulses, No clubbing, cyanosis, or edema  PSYCH: AAOx3  NEUROLOGY: non-focal  SKIN: No rashes or lesions    MEDICATIONS:  MEDICATIONS  (STANDING):  ALBUTerol    0.083% 2.5 milliGRAM(s) Nebulizer every 6 hours  amLODIPine   Tablet 10 milliGRAM(s) Oral daily  buDESOnide 160 MICROgram(s)/formoterol 4.5 MICROgram(s) Inhaler 2 Puff(s) Inhalation two times a day  chlorhexidine 0.12% Liquid 15 milliLiter(s) Swish and Spit two times a day  chlorhexidine 4% Liquid 1 Application(s) Topical <User Schedule>  collagenase Ointment 1 Application(s) Topical daily  docusate sodium 100 milliGRAM(s) Oral two times a day  furosemide   Injectable 40 milliGRAM(s) IV Push daily  gabapentin 400 milliGRAM(s) Oral three times a day  heparin  Injectable 7500 Unit(s) SubCutaneous every 8 hours  influenza   Vaccine 0.5 milliLiter(s) IntraMuscular once  insulin glargine Injectable (LANTUS) 22 Unit(s) SubCutaneous at bedtime  insulin lispro (HumaLOG) corrective regimen sliding scale   SubCutaneous every 6 hours  levothyroxine 137 MICROGram(s) Oral daily  metoprolol tartrate 25 milliGRAM(s) Enteral Tube two times a day  pantoprazole    Tablet 40 milliGRAM(s) Oral before breakfast  piperacillin/tazobactam IVPB. 3.375 Gram(s) IV Intermittent every 8 hours  polyethylene glycol 3350 17 Gram(s) Oral daily  predniSONE   Tablet 10 milliGRAM(s) Oral daily  senna 2 Tablet(s) Oral at bedtime  simvastatin 20 milliGRAM(s) Oral at bedtime  sodium chloride 3%  Inhalation 3 milliLiter(s) Inhalation two times a day  traZODone 50 milliGRAM(s) Oral at bedtime  vancomycin  IVPB      vancomycin  IVPB 500 milliGRAM(s) IV Intermittent every 12 hours    MEDICATIONS  (PRN):  guaiFENesin   Syrup  (Sugar-Free) 200 milliGRAM(s) Oral every 6 hours PRN Cough  HYDROmorphone  Injectable 1 milliGRAM(s) IV Push every 4 hours PRN Severe Pain (7 - 10)  naloxone Injectable 0.1 milliGRAM(s) IV Push every 3 minutes PRN For ANY of the following changes in patient status:  A. RR LESS THAN 10 breaths per minute, B. Oxygen saturation LESS THAN 90%, C. Sedation score of 6  oxyCODONE    Solution 10 milliGRAM(s) Oral every 3 hours PRN Moderate Pain (4 - 6)  oxyCODONE    Solution 15 milliGRAM(s) Oral every 3 hours PRN Severe Pain (7 - 10)      ALLERGIES:  Allergies    No Known Allergies    Intolerances    Seroquel (Other)      LABS:                        9.2    9.23  )-----------( 387      ( 2019 03:00 )             31.3     01-    140  |  96<L>  |  21  ----------------------------<  121<H>  3.8   |  30  |  1.25    Ca    8.9      2019 03:00  Phos  4.4       Mg     1.8         TPro  6.7  /  Alb  3.2<L>  /  TBili  0.3  /  DBili  x   /  AST  15  /  ALT  14  /  AlkPhos  96        Urinalysis Basic - ( 2019 16:15 )    Color: YELLOW / Appearance: CLEAR / S.023 / pH: 6.0  Gluc: NEGATIVE / Ketone: NEGATIVE  / Bili: NEGATIVE / Urobili: NORMAL   Blood: NEGATIVE / Protein: 30 / Nitrite: NEGATIVE   Leuk Esterase: SMALL / RBC: 3-5 / WBC 6-10   Sq Epi: FEW / Non Sq Epi: x / Bacteria: NEGATIVE        RADIOLOGY & ADDITIONAL TESTS: Reviewed. INTERVAL HPI/OVERNIGHT EVENTS:  Overnight: no events   This morning, patient is feeling well. Has a cough with increased sputum production. No fevers, chills.   SUBJECTIVE: Patient seen and examined at bedside.     ROS:  CONSTITUTIONAL: No fevers or chills  RESPIRATORY: cough, shortness of breath  CARDIOVASCULAR: No chest pain or palpitations  GASTROINTESTINAL: No abdominal or epigastric pain. No nausea, vomiting  GENITOURINARY: No dysuria, frequency or hematuria  SKIN: No itching, rashes    OBJECTIVE:    VITAL SIGNS:  ICU Vital Signs Last 24 Hrs  T(C): 38.2 (2019 08:00), Max: 38.2 (2019 08:00)  T(F): 100.7 (2019 08:00), Max: 100.7 (2019 08:00)  HR: 125 (2019 08:00) (102 - 125)  BP: 111/64 (2019 08:00) (86/56 - 181/154)  BP(mean): 73 (2019 08:00) (62 - 159)  ABP: --  ABP(mean): --  RR: 30 (2019 08:00) (17 - 37)  SpO2: 87% (2019 08:00) (84% - 97%)    Mode: NIV (Noninvasive Ventilation), RR (machine): 18, TV (machine): 400, FiO2: 100, PEEP: 6, MAP: 10, PIP: 11     @ 07:01  -   @ 07:00  --------------------------------------------------------  IN: 300 mL / OUT: 550 mL / NET: -250 mL      CAPILLARY BLOOD GLUCOSE      POCT Blood Glucose.: 125 mg/dL (2019 06:10)      PHYSICAL EXAM:  GENERAL: NAD  HEAD:  Atraumatic, Normocephalic  EYES: conjunctiva and sclera clear  CHEST/LUNG: good air movement in anterior regions   HEART: Regular rate and rhythm; No murmurs, rubs, or gallops  ABDOMEN: Soft, tender, Nondistended; Bowel sounds present  EXTREMITIES:  2+ Peripheral Pulses, No clubbing, cyanosis, or edema  PSYCH: AAOx3  NEUROLOGY: non-focal  SKIN: No rashes or lesions    MEDICATIONS:  MEDICATIONS  (STANDING):  ALBUTerol    0.083% 2.5 milliGRAM(s) Nebulizer every 6 hours  amLODIPine   Tablet 10 milliGRAM(s) Oral daily  buDESOnide 160 MICROgram(s)/formoterol 4.5 MICROgram(s) Inhaler 2 Puff(s) Inhalation two times a day  chlorhexidine 0.12% Liquid 15 milliLiter(s) Swish and Spit two times a day  chlorhexidine 4% Liquid 1 Application(s) Topical <User Schedule>  collagenase Ointment 1 Application(s) Topical daily  docusate sodium 100 milliGRAM(s) Oral two times a day  furosemide   Injectable 40 milliGRAM(s) IV Push daily  gabapentin 400 milliGRAM(s) Oral three times a day  heparin  Injectable 7500 Unit(s) SubCutaneous every 8 hours  influenza   Vaccine 0.5 milliLiter(s) IntraMuscular once  insulin glargine Injectable (LANTUS) 22 Unit(s) SubCutaneous at bedtime  insulin lispro (HumaLOG) corrective regimen sliding scale   SubCutaneous every 6 hours  levothyroxine 137 MICROGram(s) Oral daily  metoprolol tartrate 25 milliGRAM(s) Enteral Tube two times a day  pantoprazole    Tablet 40 milliGRAM(s) Oral before breakfast  piperacillin/tazobactam IVPB. 3.375 Gram(s) IV Intermittent every 8 hours  polyethylene glycol 3350 17 Gram(s) Oral daily  predniSONE   Tablet 10 milliGRAM(s) Oral daily  senna 2 Tablet(s) Oral at bedtime  simvastatin 20 milliGRAM(s) Oral at bedtime  sodium chloride 3%  Inhalation 3 milliLiter(s) Inhalation two times a day  traZODone 50 milliGRAM(s) Oral at bedtime  vancomycin  IVPB      vancomycin  IVPB 500 milliGRAM(s) IV Intermittent every 12 hours    MEDICATIONS  (PRN):  guaiFENesin   Syrup  (Sugar-Free) 200 milliGRAM(s) Oral every 6 hours PRN Cough  HYDROmorphone  Injectable 1 milliGRAM(s) IV Push every 4 hours PRN Severe Pain (7 - 10)  naloxone Injectable 0.1 milliGRAM(s) IV Push every 3 minutes PRN For ANY of the following changes in patient status:  A. RR LESS THAN 10 breaths per minute, B. Oxygen saturation LESS THAN 90%, C. Sedation score of 6  oxyCODONE    Solution 10 milliGRAM(s) Oral every 3 hours PRN Moderate Pain (4 - 6)  oxyCODONE    Solution 15 milliGRAM(s) Oral every 3 hours PRN Severe Pain (7 - 10)      ALLERGIES:  Allergies    No Known Allergies    Intolerances    Seroquel (Other)      LABS:                        9.2    9.23  )-----------( 387      ( 2019 03:00 )             31.3         140  |  96<L>  |  21  ----------------------------<  121<H>  3.8   |  30  |  1.25    Ca    8.9      2019 03:00  Phos  4.4       Mg     1.8         TPro  6.7  /  Alb  3.2<L>  /  TBili  0.3  /  DBili  x   /  AST  15  /  ALT  14  /  AlkPhos  96        Urinalysis Basic - ( 2019 16:15 )    Color: YELLOW / Appearance: CLEAR / S.023 / pH: 6.0  Gluc: NEGATIVE / Ketone: NEGATIVE  / Bili: NEGATIVE / Urobili: NORMAL   Blood: NEGATIVE / Protein: 30 / Nitrite: NEGATIVE   Leuk Esterase: SMALL / RBC: 3-5 / WBC 6-10   Sq Epi: FEW / Non Sq Epi: x / Bacteria: NEGATIVE        RADIOLOGY & ADDITIONAL TESTS: Reviewed. INTERVAL HPI/OVERNIGHT EVENTS:  Overnight: no events   This morning, patient is feeling well. Has a cough with increased sputum production. No fevers, chills.   SUBJECTIVE: Patient seen and examined at bedside.     ROS:  CONSTITUTIONAL: No fevers or chills  RESPIRATORY: cough, shortness of breath  CARDIOVASCULAR: No chest pain or palpitations  GASTROINTESTINAL: No abdominal or epigastric pain. No nausea, vomiting  GENITOURINARY: No dysuria, frequency or hematuria  SKIN: No itching, rashes    OBJECTIVE:    VITAL SIGNS:  ICU Vital Signs Last 24 Hrs  T(C): 38.2 (2019 08:00), Max: 38.2 (2019 08:00)  T(F): 100.7 (2019 08:00), Max: 100.7 (2019 08:00)  HR: 125 (2019 08:00) (102 - 125)  BP: 111/64 (2019 08:00) (86/56 - 181/154)  BP(mean): 73 (2019 08:00) (62 - 159)  ABP: --  ABP(mean): --  RR: 30 (2019 08:00) (17 - 37)  SpO2: 87% (2019 08:00) (84% - 97%)    Mode: NIV (Noninvasive Ventilation), RR (machine): 18, TV (machine): 400, FiO2: 100, PEEP: 6, MAP: 10, PIP: 11     @ 07:01  -   @ 07:00  --------------------------------------------------------  IN: 300 mL / OUT: 550 mL / NET: -250 mL      CAPILLARY BLOOD GLUCOSE      POCT Blood Glucose.: 125 mg/dL (2019 06:10)      PHYSICAL EXAM:  GENERAL: NAD  HEAD:  Atraumatic, Normocephalic  EYES: conjunctiva and sclera clear  CHEST/LUNG: good air movement in anterior regions   HEART: Regular rate and rhythm; No murmurs, rubs, or gallops  ABDOMEN: Soft, tender, Nondistended; Bowel sounds present, open wound covered in gauze   EXTREMITIES:  2+ Peripheral Pulses, No clubbing, cyanosis, or edema  PSYCH: AAOx3  SKIN: No rashes or lesions    MEDICATIONS:  MEDICATIONS  (STANDING):  ALBUTerol    0.083% 2.5 milliGRAM(s) Nebulizer every 6 hours  amLODIPine   Tablet 10 milliGRAM(s) Oral daily  buDESOnide 160 MICROgram(s)/formoterol 4.5 MICROgram(s) Inhaler 2 Puff(s) Inhalation two times a day  chlorhexidine 0.12% Liquid 15 milliLiter(s) Swish and Spit two times a day  chlorhexidine 4% Liquid 1 Application(s) Topical <User Schedule>  collagenase Ointment 1 Application(s) Topical daily  docusate sodium 100 milliGRAM(s) Oral two times a day  furosemide   Injectable 40 milliGRAM(s) IV Push daily  gabapentin 400 milliGRAM(s) Oral three times a day  heparin  Injectable 7500 Unit(s) SubCutaneous every 8 hours  influenza   Vaccine 0.5 milliLiter(s) IntraMuscular once  insulin glargine Injectable (LANTUS) 22 Unit(s) SubCutaneous at bedtime  insulin lispro (HumaLOG) corrective regimen sliding scale   SubCutaneous every 6 hours  levothyroxine 137 MICROGram(s) Oral daily  metoprolol tartrate 25 milliGRAM(s) Enteral Tube two times a day  pantoprazole    Tablet 40 milliGRAM(s) Oral before breakfast  piperacillin/tazobactam IVPB. 3.375 Gram(s) IV Intermittent every 8 hours  polyethylene glycol 3350 17 Gram(s) Oral daily  predniSONE   Tablet 10 milliGRAM(s) Oral daily  senna 2 Tablet(s) Oral at bedtime  simvastatin 20 milliGRAM(s) Oral at bedtime  sodium chloride 3%  Inhalation 3 milliLiter(s) Inhalation two times a day  traZODone 50 milliGRAM(s) Oral at bedtime  vancomycin  IVPB      vancomycin  IVPB 500 milliGRAM(s) IV Intermittent every 12 hours    MEDICATIONS  (PRN):  guaiFENesin   Syrup  (Sugar-Free) 200 milliGRAM(s) Oral every 6 hours PRN Cough  HYDROmorphone  Injectable 1 milliGRAM(s) IV Push every 4 hours PRN Severe Pain (7 - 10)  naloxone Injectable 0.1 milliGRAM(s) IV Push every 3 minutes PRN For ANY of the following changes in patient status:  A. RR LESS THAN 10 breaths per minute, B. Oxygen saturation LESS THAN 90%, C. Sedation score of 6  oxyCODONE    Solution 10 milliGRAM(s) Oral every 3 hours PRN Moderate Pain (4 - 6)  oxyCODONE    Solution 15 milliGRAM(s) Oral every 3 hours PRN Severe Pain (7 - 10)      ALLERGIES:  Allergies    No Known Allergies    Intolerances    Seroquel (Other)      LABS:                        9.2    9.23  )-----------( 387      ( 2019 03:00 )             31.3         140  |  96<L>  |  21  ----------------------------<  121<H>  3.8   |  30  |  1.25    Ca    8.9      2019 03:00  Phos  4.4       Mg     1.8         TPro  6.7  /  Alb  3.2<L>  /  TBili  0.3  /  DBili  x   /  AST  15  /  ALT  14  /  AlkPhos  96        Urinalysis Basic - ( 2019 16:15 )    Color: YELLOW / Appearance: CLEAR / S.023 / pH: 6.0  Gluc: NEGATIVE / Ketone: NEGATIVE  / Bili: NEGATIVE / Urobili: NORMAL   Blood: NEGATIVE / Protein: 30 / Nitrite: NEGATIVE   Leuk Esterase: SMALL / RBC: 3-5 / WBC 6-10   Sq Epi: FEW / Non Sq Epi: x / Bacteria: NEGATIVE        RADIOLOGY & ADDITIONAL TESTS: Reviewed.

## 2019-01-30 NOTE — PROGRESS NOTE ADULT - ATTENDING COMMENTS
severe hypoxemic resp failure, shunt physiology, cardiac function almost hyperdynamic with a little DLVOT obstruction, be careful with diuretics, likely atelectasis, will try and get her more upright. on expanded abx coverage and will check sputum/guarded

## 2019-01-30 NOTE — PROGRESS NOTE ADULT - ASSESSMENT
63F w/ multiple respiratory comorbidities p/w SBO s/p ex-lap with findings of healthy bowel c/b evisceration of bowel requiring placement of retention sutures, difficult to wean off ventilation, MATEO done 01/23 to evaluate endocarditis, patient extubated and downgraded to RCU 01/26 but readmitted to MICU due to hypoxic respiratory distress improving with diuresis and BiPAP.     Plan  - The loose retention sutures in the middle were taken out   - Patient on vancomycin for enterococcal bacteremia, but has been febrile x 2 and tachycardic, ID reconsulted and patient was started on Zosyn 01/29, blood culture sent   - Recommend CT chest, abdomen and pelvis with IV contrast once patient is stable for transport   - Continue BID dressing changes to midline wound.   - Surgery will continue to follow

## 2019-01-30 NOTE — PROGRESS NOTE ADULT - ASSESSMENT
Ms. Morrow is a 63 year old female with PMHx of HTN, DM, hypothyroidism, RA (prednisone 10mg), pulmonary HTN, CHRIS, and COPD (3L at home) who was originally admitted on 12/15 for SBO s/p ex lap with lysis of adhesions (4 retention sutures) c/b 12/30 she eviscerated 2/2 a coughing episode s/p ex lap (6 retention sutures) c/b multifocal pneumonia, developed hypercapnic respiratory failure requiring intubation 1/6, s/p bronch 1/9/18, and extubated on 1/24, course further c/b Enterococci bacteremia now readmitted to MICU for hypoxic respiratory failure 2/2 atelectasis.    #Neuro  -no active issues  - Pain control with oxycodone and Dilaudid     #Respiratory   Hypoxic Respiratory Failure 2/2 atelectasis.   - Continue AVAPS (previously high flow NC during the day AVAPs at night) for positive pressure   - Continue Spiriva & Symbicort. Duoneb PRN for COPD    #Cardiovascular   - Continue amlodipine 10mg, furosemide 40mg (starting tomorrow), and metoprolol 25mg BID    - Simvastatin 20mg     #GI  SBO s/p ex-lap with findings of healthy bowel c/b evisceration of bowel requiring placement of retention sutures  - NPO for now; continue feeds when oxygen requirement stable.   - Continue Protonix  - Continue bowel regimen     #Renal/Metabolic  - Monitor for electrolyte derangements while receiving diuretics.     #ID  Enterococci bacteremia  - Vancomycin D19  Due to fever of unknown origin, antibiotics were broadened for gram negative coverage- Zosyn D2  Repeat infectious work up negative: UA, RVP  - Pending blood cultures     #Endocrine  Diabetes Mellitus Type 2  - Lantus 22U bedtime and ISS  - Holding premeal insulin while NPO   - Gabapentin 400 TID   Rheumatoid arthritis   - Continue prednisone 10mg   Hypothyroidism   - Levothyroxine 137     #DVT ppx  - Heparin SQ     #Skin  - PIV access.

## 2019-01-30 NOTE — PROGRESS NOTE ADULT - SUBJECTIVE AND OBJECTIVE BOX
B-Team Surgery Progress Note     Subjective/24hour Events: patient was readmitted to MICU due to acute hypoxic respiratory distress desaturating to 70%, patient's respiratory status improved with BiPAP before she was intubated. She is on BiPAP this morning with O2 sat at 91%, mentating well.      Vital Signs Last 24 Hrs  T(C): 38.2 (30 Jan 2019 08:00), Max: 38.2 (30 Jan 2019 08:00)  T(F): 100.7 (30 Jan 2019 08:00), Max: 100.7 (30 Jan 2019 08:00)  HR: 125 (30 Jan 2019 08:00) (102 - 125)  BP: 111/64 (30 Jan 2019 08:00) (86/56 - 181/154)  BP(mean): 73 (30 Jan 2019 08:00) (62 - 159)  RR: 30 (30 Jan 2019 08:00) (17 - 37)  SpO2: 87% (30 Jan 2019 08:00) (84% - 97%)    Physical Exam:  Gen: NAD, awake and interactive   Pulm: on BiPAP satting at 91%  Card: pulse regularly but tachycardic and vital sign stable per monitoring   GI: Obese, soft, non-distended, non-tender, with fibrinous exudate over granulation tissue, dressing changed this morning

## 2019-01-31 DIAGNOSIS — J96.90 RESPIRATORY FAILURE, UNSPECIFIED, UNSPECIFIED WHETHER WITH HYPOXIA OR HYPERCAPNIA: ICD-10-CM

## 2019-01-31 DIAGNOSIS — Z51.5 ENCOUNTER FOR PALLIATIVE CARE: ICD-10-CM

## 2019-01-31 DIAGNOSIS — R53.81 OTHER MALAISE: ICD-10-CM

## 2019-01-31 DIAGNOSIS — R52 PAIN, UNSPECIFIED: ICD-10-CM

## 2019-01-31 LAB
BASE EXCESS BLDA CALC-SCNC: 0 MMOL/L — SIGNIFICANT CHANGE UP
CHLORIDE BLDA-SCNC: 100 MMOL/L — SIGNIFICANT CHANGE UP (ref 96–108)
GLUCOSE BLDA-MCNC: 266 MG/DL — HIGH (ref 70–99)
GLUCOSE BLDC GLUCOMTR-MCNC: 155 MG/DL — HIGH (ref 70–99)
GLUCOSE BLDC GLUCOMTR-MCNC: 183 MG/DL — HIGH (ref 70–99)
GLUCOSE BLDC GLUCOMTR-MCNC: 244 MG/DL — HIGH (ref 70–99)
GLUCOSE BLDC GLUCOMTR-MCNC: 250 MG/DL — HIGH (ref 70–99)
HCO3 BLDA-SCNC: 24 MMOL/L — SIGNIFICANT CHANGE UP (ref 22–26)
HCT VFR BLDA CALC: 30.2 % — LOW (ref 34.5–46.5)
HGB BLDA-MCNC: 9.8 G/DL — LOW (ref 11.5–15.5)
LACTATE BLDA-SCNC: 1.9 MMOL/L — SIGNIFICANT CHANGE UP (ref 0.5–2)
PCO2 BLDA: 46 MMHG — SIGNIFICANT CHANGE UP (ref 32–48)
PH BLDA: 7.35 PH — SIGNIFICANT CHANGE UP (ref 7.35–7.45)
PO2 BLDA: 60 MMHG — LOW (ref 83–108)
POTASSIUM BLDA-SCNC: 3.1 MMOL/L — LOW (ref 3.4–4.5)
SAO2 % BLDA: 87.4 % — LOW (ref 95–99)
SODIUM BLDA-SCNC: 138 MMOL/L — SIGNIFICANT CHANGE UP (ref 136–146)
VANCOMYCIN TROUGH SERPL-MCNC: 23.2 UG/ML — HIGH (ref 10–20)

## 2019-01-31 PROCEDURE — 99232 SBSQ HOSP IP/OBS MODERATE 35: CPT

## 2019-01-31 PROCEDURE — 31624 DX BRONCHOSCOPE/LAVAGE: CPT | Mod: GC

## 2019-01-31 PROCEDURE — 99223 1ST HOSP IP/OBS HIGH 75: CPT | Mod: GC

## 2019-01-31 PROCEDURE — 99291 CRITICAL CARE FIRST HOUR: CPT | Mod: 25

## 2019-01-31 PROCEDURE — 71045 X-RAY EXAM CHEST 1 VIEW: CPT | Mod: 26

## 2019-01-31 PROCEDURE — 31500 INSERT EMERGENCY AIRWAY: CPT

## 2019-01-31 RX ORDER — DEXTROSE 50 % IN WATER 50 %
25 SYRINGE (ML) INTRAVENOUS ONCE
Qty: 0 | Refills: 0 | Status: DISCONTINUED | OUTPATIENT
Start: 2019-01-31 | End: 2019-02-05

## 2019-01-31 RX ORDER — MIDAZOLAM HYDROCHLORIDE 1 MG/ML
2 INJECTION, SOLUTION INTRAMUSCULAR; INTRAVENOUS ONCE
Qty: 0 | Refills: 0 | Status: DISCONTINUED | OUTPATIENT
Start: 2019-01-31 | End: 2019-01-31

## 2019-01-31 RX ORDER — INSULIN GLARGINE 100 [IU]/ML
10 INJECTION, SOLUTION SUBCUTANEOUS AT BEDTIME
Qty: 0 | Refills: 0 | Status: DISCONTINUED | OUTPATIENT
Start: 2019-01-31 | End: 2019-02-01

## 2019-01-31 RX ORDER — VANCOMYCIN HCL 1 G
500 VIAL (EA) INTRAVENOUS EVERY 24 HOURS
Qty: 0 | Refills: 0 | Status: DISCONTINUED | OUTPATIENT
Start: 2019-01-31 | End: 2019-02-02

## 2019-01-31 RX ORDER — DEXTROSE 50 % IN WATER 50 %
15 SYRINGE (ML) INTRAVENOUS ONCE
Qty: 0 | Refills: 0 | Status: DISCONTINUED | OUTPATIENT
Start: 2019-01-31 | End: 2019-02-05

## 2019-01-31 RX ORDER — NOREPINEPHRINE BITARTRATE/D5W 8 MG/250ML
0.05 PLASTIC BAG, INJECTION (ML) INTRAVENOUS
Qty: 8 | Refills: 0 | Status: DISCONTINUED | OUTPATIENT
Start: 2019-01-31 | End: 2019-02-01

## 2019-01-31 RX ORDER — FENTANYL CITRATE 50 UG/ML
100 INJECTION INTRAVENOUS ONCE
Qty: 0 | Refills: 0 | Status: DISCONTINUED | OUTPATIENT
Start: 2019-01-31 | End: 2019-01-31

## 2019-01-31 RX ORDER — GLUCAGON INJECTION, SOLUTION 0.5 MG/.1ML
1 INJECTION, SOLUTION SUBCUTANEOUS ONCE
Qty: 0 | Refills: 0 | Status: DISCONTINUED | OUTPATIENT
Start: 2019-01-31 | End: 2019-02-05

## 2019-01-31 RX ORDER — INSULIN LISPRO 100/ML
VIAL (ML) SUBCUTANEOUS EVERY 6 HOURS
Qty: 0 | Refills: 0 | Status: DISCONTINUED | OUTPATIENT
Start: 2019-01-31 | End: 2019-02-05

## 2019-01-31 RX ORDER — PROPOFOL 10 MG/ML
30 INJECTION, EMULSION INTRAVENOUS ONCE
Qty: 0 | Refills: 0 | Status: DISCONTINUED | OUTPATIENT
Start: 2019-01-31 | End: 2019-02-01

## 2019-01-31 RX ORDER — MIDAZOLAM HYDROCHLORIDE 1 MG/ML
4 INJECTION, SOLUTION INTRAMUSCULAR; INTRAVENOUS ONCE
Qty: 0 | Refills: 0 | Status: DISCONTINUED | OUTPATIENT
Start: 2019-01-31 | End: 2019-01-31

## 2019-01-31 RX ORDER — MIDAZOLAM HYDROCHLORIDE 1 MG/ML
6 INJECTION, SOLUTION INTRAMUSCULAR; INTRAVENOUS ONCE
Qty: 0 | Refills: 0 | Status: DISCONTINUED | OUTPATIENT
Start: 2019-01-31 | End: 2019-01-31

## 2019-01-31 RX ORDER — FENTANYL CITRATE 50 UG/ML
100 INJECTION INTRAVENOUS ONCE
Qty: 0 | Refills: 0 | Status: DISCONTINUED | OUTPATIENT
Start: 2019-01-31 | End: 2019-02-01

## 2019-01-31 RX ORDER — FENTANYL CITRATE 50 UG/ML
0.5 INJECTION INTRAVENOUS
Qty: 2500 | Refills: 0 | Status: DISCONTINUED | OUTPATIENT
Start: 2019-01-31 | End: 2019-02-07

## 2019-01-31 RX ORDER — HYDROMORPHONE HYDROCHLORIDE 2 MG/ML
1 INJECTION INTRAMUSCULAR; INTRAVENOUS; SUBCUTANEOUS ONCE
Qty: 0 | Refills: 0 | Status: DISCONTINUED | OUTPATIENT
Start: 2019-01-31 | End: 2019-01-31

## 2019-01-31 RX ORDER — PROPOFOL 10 MG/ML
50 INJECTION, EMULSION INTRAVENOUS ONCE
Qty: 0 | Refills: 0 | Status: DISCONTINUED | OUTPATIENT
Start: 2019-01-31 | End: 2019-02-01

## 2019-01-31 RX ORDER — SODIUM CHLORIDE 9 MG/ML
1000 INJECTION, SOLUTION INTRAVENOUS
Qty: 0 | Refills: 0 | Status: DISCONTINUED | OUTPATIENT
Start: 2019-01-31 | End: 2019-02-05

## 2019-01-31 RX ORDER — OXYCODONE HYDROCHLORIDE 5 MG/1
10 TABLET ORAL EVERY 12 HOURS
Qty: 0 | Refills: 0 | Status: DISCONTINUED | OUTPATIENT
Start: 2019-01-31 | End: 2019-01-31

## 2019-01-31 RX ORDER — PROPOFOL 10 MG/ML
50 INJECTION, EMULSION INTRAVENOUS
Qty: 1000 | Refills: 0 | Status: DISCONTINUED | OUTPATIENT
Start: 2019-01-31 | End: 2019-02-11

## 2019-01-31 RX ORDER — PROPOFOL 10 MG/ML
20 INJECTION, EMULSION INTRAVENOUS ONCE
Qty: 0 | Refills: 0 | Status: DISCONTINUED | OUTPATIENT
Start: 2019-01-31 | End: 2019-02-01

## 2019-01-31 RX ORDER — CHLORHEXIDINE GLUCONATE 213 G/1000ML
1 SOLUTION TOPICAL
Qty: 0 | Refills: 0 | Status: DISCONTINUED | OUTPATIENT
Start: 2019-01-31 | End: 2019-02-27

## 2019-01-31 RX ORDER — DEXTROSE 50 % IN WATER 50 %
12.5 SYRINGE (ML) INTRAVENOUS ONCE
Qty: 0 | Refills: 0 | Status: DISCONTINUED | OUTPATIENT
Start: 2019-01-31 | End: 2019-02-05

## 2019-01-31 RX ORDER — ACETAMINOPHEN 500 MG
1000 TABLET ORAL ONCE
Qty: 0 | Refills: 0 | Status: COMPLETED | OUTPATIENT
Start: 2019-01-31 | End: 2019-01-31

## 2019-01-31 RX ADMIN — ALBUTEROL 2.5 MILLIGRAM(S): 90 AEROSOL, METERED ORAL at 15:31

## 2019-01-31 RX ADMIN — HYDROMORPHONE HYDROCHLORIDE 1 MILLIGRAM(S): 2 INJECTION INTRAMUSCULAR; INTRAVENOUS; SUBCUTANEOUS at 00:00

## 2019-01-31 RX ADMIN — MIDAZOLAM HYDROCHLORIDE 2 MILLIGRAM(S): 1 INJECTION, SOLUTION INTRAMUSCULAR; INTRAVENOUS at 17:00

## 2019-01-31 RX ADMIN — PIPERACILLIN AND TAZOBACTAM 25 GRAM(S): 4; .5 INJECTION, POWDER, LYOPHILIZED, FOR SOLUTION INTRAVENOUS at 06:28

## 2019-01-31 RX ADMIN — Medication 100 MILLIGRAM(S): at 13:05

## 2019-01-31 RX ADMIN — ALBUTEROL 2.5 MILLIGRAM(S): 90 AEROSOL, METERED ORAL at 10:16

## 2019-01-31 RX ADMIN — SODIUM CHLORIDE 3 MILLILITER(S): 9 INJECTION INTRAMUSCULAR; INTRAVENOUS; SUBCUTANEOUS at 10:16

## 2019-01-31 RX ADMIN — PIPERACILLIN AND TAZOBACTAM 25 GRAM(S): 4; .5 INJECTION, POWDER, LYOPHILIZED, FOR SOLUTION INTRAVENOUS at 23:09

## 2019-01-31 RX ADMIN — Medication 50 MILLIGRAM(S): at 23:09

## 2019-01-31 RX ADMIN — Medication 2: at 13:05

## 2019-01-31 RX ADMIN — MIDAZOLAM HYDROCHLORIDE 4 MILLIGRAM(S): 1 INJECTION, SOLUTION INTRAMUSCULAR; INTRAVENOUS at 17:10

## 2019-01-31 RX ADMIN — FENTANYL CITRATE 100 MICROGRAM(S): 50 INJECTION INTRAVENOUS at 19:30

## 2019-01-31 RX ADMIN — HEPARIN SODIUM 7500 UNIT(S): 5000 INJECTION INTRAVENOUS; SUBCUTANEOUS at 13:10

## 2019-01-31 RX ADMIN — HYDROMORPHONE HYDROCHLORIDE 1 MILLIGRAM(S): 2 INJECTION INTRAMUSCULAR; INTRAVENOUS; SUBCUTANEOUS at 13:20

## 2019-01-31 RX ADMIN — Medication 10.52 MICROGRAM(S)/KG/MIN: at 23:13

## 2019-01-31 RX ADMIN — HEPARIN SODIUM 7500 UNIT(S): 5000 INJECTION INTRAVENOUS; SUBCUTANEOUS at 23:09

## 2019-01-31 RX ADMIN — HYDROMORPHONE HYDROCHLORIDE 1 MILLIGRAM(S): 2 INJECTION INTRAMUSCULAR; INTRAVENOUS; SUBCUTANEOUS at 15:33

## 2019-01-31 RX ADMIN — CHLORHEXIDINE GLUCONATE 1 APPLICATION(S): 213 SOLUTION TOPICAL at 18:54

## 2019-01-31 RX ADMIN — FENTANYL CITRATE 100 MICROGRAM(S): 50 INJECTION INTRAVENOUS at 17:48

## 2019-01-31 RX ADMIN — HYDROMORPHONE HYDROCHLORIDE 1 MILLIGRAM(S): 2 INJECTION INTRAMUSCULAR; INTRAVENOUS; SUBCUTANEOUS at 02:35

## 2019-01-31 RX ADMIN — HYDROMORPHONE HYDROCHLORIDE 1 MILLIGRAM(S): 2 INJECTION INTRAMUSCULAR; INTRAVENOUS; SUBCUTANEOUS at 09:20

## 2019-01-31 RX ADMIN — SIMVASTATIN 20 MILLIGRAM(S): 20 TABLET, FILM COATED ORAL at 23:09

## 2019-01-31 RX ADMIN — GABAPENTIN 400 MILLIGRAM(S): 400 CAPSULE ORAL at 23:09

## 2019-01-31 RX ADMIN — HYDROMORPHONE HYDROCHLORIDE 1 MILLIGRAM(S): 2 INJECTION INTRAMUSCULAR; INTRAVENOUS; SUBCUTANEOUS at 05:00

## 2019-01-31 RX ADMIN — Medication 1 APPLICATION(S): at 06:59

## 2019-01-31 RX ADMIN — HYDROMORPHONE HYDROCHLORIDE 1 MILLIGRAM(S): 2 INJECTION INTRAMUSCULAR; INTRAVENOUS; SUBCUTANEOUS at 09:03

## 2019-01-31 RX ADMIN — ALBUTEROL 2.5 MILLIGRAM(S): 90 AEROSOL, METERED ORAL at 04:57

## 2019-01-31 RX ADMIN — PROPOFOL 33.66 MICROGRAM(S)/KG/MIN: 10 INJECTION, EMULSION INTRAVENOUS at 23:12

## 2019-01-31 RX ADMIN — MIDAZOLAM HYDROCHLORIDE 2 MILLIGRAM(S): 1 INJECTION, SOLUTION INTRAMUSCULAR; INTRAVENOUS at 19:30

## 2019-01-31 RX ADMIN — FENTANYL CITRATE 100 MICROGRAM(S): 50 INJECTION INTRAVENOUS at 19:45

## 2019-01-31 RX ADMIN — SENNA PLUS 2 TABLET(S): 8.6 TABLET ORAL at 23:09

## 2019-01-31 RX ADMIN — MIDAZOLAM HYDROCHLORIDE 6 MILLIGRAM(S): 1 INJECTION, SOLUTION INTRAMUSCULAR; INTRAVENOUS at 17:00

## 2019-01-31 RX ADMIN — Medication 1000 MILLIGRAM(S): at 12:30

## 2019-01-31 RX ADMIN — FENTANYL CITRATE 5.61 MICROGRAM(S)/KG/HR: 50 INJECTION INTRAVENOUS at 23:12

## 2019-01-31 RX ADMIN — Medication 400 MILLIGRAM(S): at 11:31

## 2019-01-31 RX ADMIN — PIPERACILLIN AND TAZOBACTAM 25 GRAM(S): 4; .5 INJECTION, POWDER, LYOPHILIZED, FOR SOLUTION INTRAVENOUS at 13:05

## 2019-01-31 RX ADMIN — HYDROMORPHONE HYDROCHLORIDE 1 MILLIGRAM(S): 2 INJECTION INTRAMUSCULAR; INTRAVENOUS; SUBCUTANEOUS at 16:00

## 2019-01-31 RX ADMIN — GABAPENTIN 400 MILLIGRAM(S): 400 CAPSULE ORAL at 13:10

## 2019-01-31 RX ADMIN — HYDROMORPHONE HYDROCHLORIDE 1 MILLIGRAM(S): 2 INJECTION INTRAMUSCULAR; INTRAVENOUS; SUBCUTANEOUS at 04:40

## 2019-01-31 RX ADMIN — HEPARIN SODIUM 7500 UNIT(S): 5000 INJECTION INTRAVENOUS; SUBCUTANEOUS at 06:28

## 2019-01-31 RX ADMIN — HYDROMORPHONE HYDROCHLORIDE 1 MILLIGRAM(S): 2 INJECTION INTRAMUSCULAR; INTRAVENOUS; SUBCUTANEOUS at 12:55

## 2019-01-31 RX ADMIN — Medication 4: at 18:55

## 2019-01-31 RX ADMIN — Medication 1000 MILLIGRAM(S): at 00:00

## 2019-01-31 RX ADMIN — HYDROMORPHONE HYDROCHLORIDE 1 MILLIGRAM(S): 2 INJECTION INTRAMUSCULAR; INTRAVENOUS; SUBCUTANEOUS at 02:15

## 2019-01-31 NOTE — CONSULT NOTE ADULT - SUBJECTIVE AND OBJECTIVE BOX
HPI:  63 year old woman with COPD on home O2 (3L), moderate pulmonary HTN, DM, RA on prednisone (10 mg daily x 30 years), HTN, s/p CVA (no residual deficit), s/p  x2 presents with abdominal pain for one day.  She developed mid abdominal cramping pain, sharp, that had been constant and moderate to severe.  It was associated with multiple episodes of nonbloody, nonbilious emesis.  She had a normal bowel movement prior to admission. CT scan shows SBO with transition point in the lower abdomen with associated interloop fluid and mesenteric edema. (15 Dec 2018 23:33). Shed needed to undergo emergent exploratory laparotomy with lysis of adhesions. 2 weeks later she underwent another Exploratory laparotomy with closure with external retention sutures for evisceration. Patient post op course, complicated by infection.     PERTINENT PM/SXH:   Diverticulosis  Cellulitis  CVA (Cerebral Vascular Accident)  RA (Rheumatoid Arthritis)  Tooth Abscess  Arthritis  Cigarette Smoker  Chronic Asthma  Adult Hypothyroidism  Borderline Diabetes Mellitus  High Cholesterol  H/O: HTN    Wrist Disorder  History of  Section    FAMILY HISTORY:  No pertinent family history in first degree relatives      SOCIAL HISTORY:   Significant other/partner: Yes [ ]  No [ ] Children:  Yes [x ]  No [ ] Hindu/Spirituality:  Substance hx: Yes[ ]  No [ x]   Tobacco hx:  Yes [x ] No [ ]   Alcohol hx: Yes [ ] No [x ]   Home Opioid hx:  [ ] I-Stop Reference No:  Living Situation: [x ]Home  [ ]Long term care  [ ]Rehab [ ]Other    ADVANCE DIRECTIVES:    DNR  MOLST  [ ]  Living Will  [ ]   DECISION MAKER(s):  [ ] Health Care Proxy(s)  [ ] Surrogate(s)  [ ] Guardian           Name(s): Phone Number(s):    BASELINE (I)ADL(s) (prior to admission):  [place]: [ ]Total  [x ] Moderate [ ]Dependent    Allergies    No Known Allergies    Intolerances    Seroquel (Other)  MEDICATIONS  (STANDING):  ALBUTerol    0.083% 2.5 milliGRAM(s) Nebulizer every 6 hours  amLODIPine   Tablet 10 milliGRAM(s) Oral daily  buDESOnide 160 MICROgram(s)/formoterol 4.5 MICROgram(s) Inhaler 2 Puff(s) Inhalation two times a day  collagenase Ointment 1 Application(s) Topical daily  docusate sodium 100 milliGRAM(s) Oral two times a day  gabapentin 400 milliGRAM(s) Oral three times a day  heparin  Injectable 7500 Unit(s) SubCutaneous every 8 hours  influenza   Vaccine 0.5 milliLiter(s) IntraMuscular once  insulin glargine Injectable (LANTUS) 22 Unit(s) SubCutaneous at bedtime  insulin lispro (HumaLOG) corrective regimen sliding scale   SubCutaneous every 6 hours  levothyroxine 137 MICROGram(s) Oral daily  metoprolol tartrate 25 milliGRAM(s) Enteral Tube two times a day  metoprolol tartrate Injectable 5 milliGRAM(s) IV Push once  pantoprazole    Tablet 40 milliGRAM(s) Oral before breakfast  piperacillin/tazobactam IVPB. 3.375 Gram(s) IV Intermittent every 8 hours  polyethylene glycol 3350 17 Gram(s) Oral daily  predniSONE   Tablet 10 milliGRAM(s) Oral daily  senna 2 Tablet(s) Oral at bedtime  simvastatin 20 milliGRAM(s) Oral at bedtime  sodium chloride 3%  Inhalation 3 milliLiter(s) Inhalation two times a day  traZODone 50 milliGRAM(s) Oral at bedtime  vancomycin  IVPB 500 milliGRAM(s) IV Intermittent every 24 hours    MEDICATIONS  (PRN):  guaiFENesin   Syrup  (Sugar-Free) 200 milliGRAM(s) Oral every 6 hours PRN Cough  HYDROmorphone  Injectable 1 milliGRAM(s) IV Push every 4 hours PRN Severe Pain (7 - 10)  naloxone Injectable 0.1 milliGRAM(s) IV Push every 3 minutes PRN For ANY of the following changes in patient status:  A. RR LESS THAN 10 breaths per minute, B. Oxygen saturation LESS THAN 90%, C. Sedation score of 6    PRESENT SYMPTOMS: [ ]Unable to obtain due to poor mentation   Source if other than patient:  [ ]Family   [ ]Team     Pain (Impact on QOL):    Location -   Severity -        Minimal acceptable level (0-10 scale):  Quality:   Onset:   Duration:                 Aggravating factors -  Relieving factors -  Radiation -    PAIN AD Score:     [email or web address] (press ctrl +  left click to view)    Dyspnea:  Yes [ ] No [ ] - [ ]Mild [ ]Moderate [ ]Severe  Anxiety:    Yes [ ] No [ ] - [ ]Mild [ ]Moderate [ ]Severe  Fatigue:    Yes [ ] No [ ] - [ ]Mild [ ]Moderate [ ]Severe  Nausea:    Yes [ ] No [ ] - [ ]Mild [ ]Moderate [ ]Severe                         Loss of appetite: Yes [ ] No [ ] - [ ]Mild [ ]Moderate [ ]Severe             Constipation:  Yes [ ] No [ ] - [ ]Mild [ ]Moderate [ ]Severe    Other Symptoms:  [ ]All other review of systems negative     Karnofsky Performance Score/Palliative Performance Status Version 2:         50   http://palliative.info/resource_material/[email or web address].pdf  PHYSICAL EXAM:  Vital Signs Last 24 Hrs  T(C): 38 (2019 11:00), Max: 38 (2019 20:00)  T(F): 100.4 (2019 11:00), Max: 100.4 (2019 20:00)  HR: 132 (2019 11:29) (118 - 132)  BP: 127/45 (2019 11:00) (74/50 - 160/119)  BP(mean): 62 (2019 11:00) (56 - 127)  RR: 20 (2019 11:29) (18 - 34)  SpO2: 90% (2019 11:29) (86% - 95%) I&O's Summary    2019 07:01  -  2019 07:00  --------------------------------------------------------  IN: 300 mL / OUT: 450 mL / NET: -150 mL    2019 07:01  -  2019 12:44  --------------------------------------------------------  IN: 0 mL / OUT: 200 mL / NET: -200 mL    GENERAL:  [ ]Alert  [ ]Oriented x   [ ]Lethargic  [ ]Cachexia  [ ]Unarousable  [ ]Verbal  [ ]Non-Verbal  Behavioral:   [ ] Anxiety  [ ] Delirium [ ] Agitation [ ] Other  HEENT:  [ ]Normal   [ ]Dry mouth   [ ]ET Tube/Trach  [ ]Oral lesions  PULMONARY:   [ ]Clear  [ ]Tachypnea  [ ]Audible excessive secretions   [ ]Rhonchi        [ ]Right [ ]Left [ ]Bilateral  [ ]Crackles        [ ]Right [ ]Left [ ]Bilateral  [ ]Wheezing     [ ]Right [ ]Left [ ]Bilateral  CARDIOVASCULAR:    [ ]Regular [ ]Irregular [ ]Tachy  [ ]Ubaldo [ ]Murmur [ ]Other  GASTROINTESTINAL:  [ ]Soft  [ ]Distended   [ ]+BS  [ ]Non tender [ ]Tender  [ ]PEG [ ]OGT/ NGT  Last BM:   19 @ 07:01  -  19 @ 07:00  --------------------------------------------------------  OUT: 350 mL      GENITOURINARY:  [ ]Normal [ ] Incontinent   [ ]Oliguria/Anuria   [ ]Antoine  MUSCULOSKELETAL:   [ ]Normal   [ ]Weakness  [ ]Bed/Wheelchair bound [ ]Edema  NEUROLOGIC:   [ ]No focal deficits  [ ] Cognitive impairment  [ ] Dysphagia [ ]Dysarthria [ ] Paresis [ ]Other   SKIN:   [ ]Normal   [ ]Pressure ulcer(s)  [ ]Rash    LABS:                        9.2    9.23  )-----------( 387      ( 2019 03:00 )             31.3   01    140  |  96<L>  |  21  ----------------------------<  121<H>  3.8   |  30  |  1.25    Ca    8.9      2019 03:00  Phos  4.4       Mg     1.8         TPro  6.7  /  Alb  3.2<L>  /  TBili  0.3  /  DBili  x   /  AST  15  /  ALT  14  /  AlkPhos  96        Urinalysis Basic - ( 2019 16:15 )    Color: YELLOW / Appearance: CLEAR / S.023 / pH: 6.0  Gluc: NEGATIVE / Ketone: NEGATIVE  / Bili: NEGATIVE / Urobili: NORMAL   Blood: NEGATIVE / Protein: 30 / Nitrite: NEGATIVE   Leuk Esterase: SMALL / RBC: 3-5 / WBC 6-10   Sq Epi: FEW / Non Sq Epi: x / Bacteria: NEGATIVE      RADIOLOGY & ADDITIONAL STUDIES:    PROTEIN CALORIE MALNUTRITION PRESENT: [ ] Yes [ ] No  [ ] PPSV2 < or = to 30% [ ] significant weight loss  [ ] poor nutritional intake [ ] catabolic state [ ] anasarca     Albumin, Serum: 3.2 g/dL (19 @ 03:00)      REFERRALS:   [ ]Chaplaincy  [ ] Hospice  [ ]Child Life  [ ]Social Work  [ ]Case management [ ]Holistic Therapy   Goals of Care Discussion Document: HPI:  63 year old woman with COPD on home O2 (3L), moderate pulmonary HTN, DM, RA on prednisone (10 mg daily x 30 years), HTN, s/p CVA (no residual deficit), s/p  x2 presents with abdominal pain for one day.  She developed mid abdominal cramping pain, sharp, that had been constant and moderate to severe.  It was associated with multiple episodes of nonbloody, nonbilious emesis.  She had a normal bowel movement prior to admission. CT scan shows SBO with transition point in the lower abdomen with associated interloop fluid and mesenteric edema. (15 Dec 2018 23:33). Shed needed to undergo emergent exploratory laparotomy with lysis of adhesions. 2 weeks later she underwent another Exploratory laparotomy with closure with external retention sutures for evisceration. Patient post op course, complicated by infection.     PERTINENT PM/SXH:   Diverticulosis  Cellulitis  CVA (Cerebral Vascular Accident)  RA (Rheumatoid Arthritis)  Tooth Abscess  Arthritis  Cigarette Smoker  Chronic Asthma  Adult Hypothyroidism  Borderline Diabetes Mellitus  High Cholesterol  H/O: HTN    Wrist Disorder  History of  Section    FAMILY HISTORY:  No pertinent family history in first degree relatives      SOCIAL HISTORY:   Significant other/partner: Yes [ ]  No [ ] Children:  Yes [x ]  No [ ] Religious/Spirituality:  Substance hx: Yes[ ]  No [ x]   Tobacco hx:  Yes [x ] No [ ]   Alcohol hx: Yes [ ] No [x ]   Home Opioid hx:  [ ] I-Stop Reference No:  Living Situation: [x ]Home  [ ]Long term care  [ ]Rehab [ ]Other    ADVANCE DIRECTIVES:    DNR  MOLST  [ ]  Living Will  [ ]   DECISION MAKER(s):  [x] Health Care Proxy(s)  [ ] Surrogate(s)  [ ] Guardian           Name(s): Phone Number(s): Norris Rush [phone]    BASELINE (I)ADL(s) (prior to admission):  [place]: [ ]Total  [x ] Moderate [ ]Dependent    Allergies    No Known Allergies    Intolerances    Seroquel (Other)  MEDICATIONS  (STANDING):  ALBUTerol    0.083% 2.5 milliGRAM(s) Nebulizer every 6 hours  amLODIPine   Tablet 10 milliGRAM(s) Oral daily  buDESOnide 160 MICROgram(s)/formoterol 4.5 MICROgram(s) Inhaler 2 Puff(s) Inhalation two times a day  collagenase Ointment 1 Application(s) Topical daily  docusate sodium 100 milliGRAM(s) Oral two times a day  gabapentin 400 milliGRAM(s) Oral three times a day  heparin  Injectable 7500 Unit(s) SubCutaneous every 8 hours  influenza   Vaccine 0.5 milliLiter(s) IntraMuscular once  insulin glargine Injectable (LANTUS) 22 Unit(s) SubCutaneous at bedtime  insulin lispro (HumaLOG) corrective regimen sliding scale   SubCutaneous every 6 hours  levothyroxine 137 MICROGram(s) Oral daily  metoprolol tartrate 25 milliGRAM(s) Enteral Tube two times a day  metoprolol tartrate Injectable 5 milliGRAM(s) IV Push once  pantoprazole    Tablet 40 milliGRAM(s) Oral before breakfast  piperacillin/tazobactam IVPB. 3.375 Gram(s) IV Intermittent every 8 hours  polyethylene glycol 3350 17 Gram(s) Oral daily  predniSONE   Tablet 10 milliGRAM(s) Oral daily  senna 2 Tablet(s) Oral at bedtime  simvastatin 20 milliGRAM(s) Oral at bedtime  sodium chloride 3%  Inhalation 3 milliLiter(s) Inhalation two times a day  traZODone 50 milliGRAM(s) Oral at bedtime  vancomycin  IVPB 500 milliGRAM(s) IV Intermittent every 24 hours    MEDICATIONS  (PRN):  guaiFENesin   Syrup  (Sugar-Free) 200 milliGRAM(s) Oral every 6 hours PRN Cough  HYDROmorphone  Injectable 1 milliGRAM(s) IV Push every 4 hours PRN Severe Pain (7 - 10)  naloxone Injectable 0.1 milliGRAM(s) IV Push every 3 minutes PRN For ANY of the following changes in patient status:  A. RR LESS THAN 10 breaths per minute, B. Oxygen saturation LESS THAN 90%, C. Sedation score of 6    PRESENT SYMPTOMS: [ ]Unable to obtain due to poor mentation   Source if other than patient:  [ ]Family   [ ]Team     Pain (Impact on QOL):  Patient complaining of pain in her right leg, which is chronic, she has had it for many years. In the past she was taking Tramadol and naproxen which was helping her pain, but since her admission to the hospital her pain has worsened and Dilaudid IV only relieves her pain a little. She describes the pain as throbbing and aching but very severe.     Dyspnea:  Yes [x ] No [ ] - [ ]Mild [x ]Moderate [ ]Severe  Anxiety:    Yes [ ] No [x ] - [ ]Mild [ ]Moderate [ ]Severe  Fatigue:    Yes [ ] No [x ] - [ ]Mild [ ]Moderate [ ]Severe  Nausea:    Yes [ ] No [ ] - [ ]Mild [ ]Moderate [ ]Severe                         Loss of appetite: Yes [ ] No [ ] - [ ]Mild [ ]Moderate [ ]Severe             Constipation:  Yes [ ] No [ ] - [ ]Mild [ ]Moderate [ ]Severe    Other Symptoms:  [ ]All other review of systems negative     Karnofsky Performance Score/Palliative Performance Status Version 2:         50%    http://palliative.info/resource_material/[email or web address].pdf  PHYSICAL EXAM:  Vital Signs Last 24 Hrs  T(C): 38 (2019 11:00), Max: 38 (2019 20:00)  T(F): 100.4 (2019 11:00), Max: 100.4 (2019 20:00)  HR: 132 (2019 11:29) (118 - 132)  BP: 127/45 (2019 11:00) (74/50 - 160/119)  BP(mean): 62 (2019 11:00) (56 - 127)  RR: 20 (2019 11:29) (18 - 34)  SpO2: 90% (2019 11:29) (86% - 95%) I&O's Summary    2019 07:  -  2019 07:00  --------------------------------------------------------  IN: 300 mL / OUT: 450 mL / NET: -150 mL    2019 07:  -  2019 12:44  --------------------------------------------------------  IN: 0 mL / OUT: 200 mL / NET: -200 mL    GENERAL:  [x ]Alert  [x ]Oriented x3   [ ]Lethargic  [ ]Cachexia  [ ]Unarousable  [ ]Verbal  [ ]Non-Verbal  Behavioral:   [ ] Anxiety  [ ] Delirium [ ] Agitation [x ] Other  HEENT:  [ ]Normal   [x ]Dry mouth   [ ]ET Tube/Trach  [ ]Oral lesions [x] Hi Flow NC  PULMONARY:   [x ]Clear anteriorly  [ ]Tachypnea  [ ]Audible excessive secretions   [ ]Rhonchi        [ ]Right [ ]Left [ ]Bilateral  [ ]Crackles        [ ]Right [ ]Left [ ]Bilateral  [ ]Wheezing     [ ]Right [ ]Left [ ]Bilateral  CARDIOVASCULAR:    [x ]Regular [ ]Irregular [ ]Tachy  [ ]Ubaldo [ ]Murmur [ ]Other  GASTROINTESTINAL:  [ ]Soft  [ ]Distended   [ ]+BS  [ ]Non tender [ ]Tender  [ ]PEG [ ]OGT/ NGT [x] surgical wound  Last BM: 18  GENITOURINARY:  [ x]Normal [ ] Incontinent   [ ]Oliguria/Anuria   [ ]Antoine  MUSCULOSKELETAL:   [ ]Normal   [ x]Weakness  [ ]Bed/Wheelchair bound [ ]Edema  NEUROLOGIC:   [ x]No focal deficits  [ ] Cognitive impairment  [ ] Dysphagia [ ]Dysarthria [ ] Paresis [ ]Other   SKIN:   [ ]Normal   [ ]Pressure ulcer(s)  [ ]Rash [x] surgical wound to abdomen    LABS:                        9.2    9.23  )-----------( 387      ( 2019 03:00 )             31.3   01-    140  |  96<L>  |  21  ----------------------------<  121<H>  3.8   |  30  |  1.25    Ca    8.9      2019 03:00  Phos  4.4       Mg     1.8         TPro  6.7  /  Alb  3.2<L>  /  TBili  0.3  /  DBili  x   /  AST  15  /  ALT  14  /  AlkPhos  96        Urinalysis Basic - ( 2019 16:15 )    Color: YELLOW / Appearance: CLEAR / S.023 / pH: 6.0  Gluc: NEGATIVE / Ketone: NEGATIVE  / Bili: NEGATIVE / Urobili: NORMAL   Blood: NEGATIVE / Protein: 30 / Nitrite: NEGATIVE   Leuk Esterase: SMALL / RBC: 3-5 / WBC 6-10   Sq Epi: FEW / Non Sq Epi: x / Bacteria: NEGATIVE      RADIOLOGY & ADDITIONAL STUDIES:  < from: Xray Chest 1 View- PORTABLE-Urgent (19 @ 16:44) >  EXAM:  XR CHEST PORTABLE URGENT 1V        PROCEDURE DATE:  2019 IMPRESSION:  No significant changein bilateral patchy opacities which   could be due to multifocal pneumonia.    Continued left retrocardiac opacity which could be due to atelectasis   and/or pneumonia. A small left pleural effusion with passive atelectasis   is also possible.    < end of copied text >      PROTEIN CALORIE MALNUTRITION PRESENT: [ ] Yes [ ] No  [ ] PPSV2 < or = to 30% [ ] significant weight loss  [ ] poor nutritional intake [ ] catabolic state [ ] anasarca     Albumin, Serum: 3.2 g/dL (19 @ 03:00)      REFERRALS:   [ ]Chaplaincy  [ ] Hospice  [ ]Child Life  [ ]Social Work  [ ]Case management [ ]Holistic Therapy   Goals of Care Discussion Document: HPI:  63 year old woman with COPD on home O2 (3L), moderate pulmonary HTN, DM, RA on prednisone (10 mg daily x 30 years), HTN, s/p CVA (no residual deficit), s/p  x2 presents with abdominal pain for one day.  She developed mid abdominal cramping pain, sharp, that had been constant and moderate to severe.  It was associated with multiple episodes of nonbloody, nonbilious emesis.  She had a normal bowel movement prior to admission. CT scan shows SBO with transition point in the lower abdomen with associated interloop fluid and mesenteric edema. (15 Dec 2018 23:33). Shed needed to undergo emergent exploratory laparotomy with lysis of adhesions. 2 weeks later she underwent another Exploratory laparotomy with closure with external retention sutures for evisceration. Patient post op course, complicated by infection.     PERTINENT PM/SXH:   Diverticulosis  Cellulitis  CVA (Cerebral Vascular Accident)  RA (Rheumatoid Arthritis)  Tooth Abscess  Arthritis  Cigarette Smoker  Chronic Asthma  Adult Hypothyroidism  Borderline Diabetes Mellitus  High Cholesterol  H/O: HTN    Wrist Disorder  History of  Section    FAMILY HISTORY:  No pertinent family history in first degree relatives      SOCIAL HISTORY:   Significant other/partner: Yes [ ]  No [ x] Children:  Yes [x ]  No [ ] Uatsdin/Spirituality:  Substance hx: Yes[ ]  No [ x]   Tobacco hx:  Yes [x ] No [ ]   Alcohol hx: Yes [ ] No [x ]   Home Opioid hx:  [ ] I-Stop Reference No:  Living Situation: [x ]Home  [ ]Long term care  [ ]Rehab [ ]Other    ADVANCE DIRECTIVES:    DNR  MOLST  [ ]  Living Will  [ ]   DECISION MAKER(s):  [x] Health Care Proxy(s)  [ ] Surrogate(s)  [ ] Guardian           Name(s): Phone Number(s): Norris Rush [phone]    BASELINE (I)ADL(s) (prior to admission):  [place]: [ ]Total  [x ] Moderate [ ]Dependent    Allergies    No Known Allergies    Intolerances    Seroquel (Other)  MEDICATIONS  (STANDING):  ALBUTerol    0.083% 2.5 milliGRAM(s) Nebulizer every 6 hours  amLODIPine   Tablet 10 milliGRAM(s) Oral daily  buDESOnide 160 MICROgram(s)/formoterol 4.5 MICROgram(s) Inhaler 2 Puff(s) Inhalation two times a day  collagenase Ointment 1 Application(s) Topical daily  docusate sodium 100 milliGRAM(s) Oral two times a day  gabapentin 400 milliGRAM(s) Oral three times a day  heparin  Injectable 7500 Unit(s) SubCutaneous every 8 hours  influenza   Vaccine 0.5 milliLiter(s) IntraMuscular once  insulin glargine Injectable (LANTUS) 22 Unit(s) SubCutaneous at bedtime  insulin lispro (HumaLOG) corrective regimen sliding scale   SubCutaneous every 6 hours  levothyroxine 137 MICROGram(s) Oral daily  metoprolol tartrate 25 milliGRAM(s) Enteral Tube two times a day  metoprolol tartrate Injectable 5 milliGRAM(s) IV Push once  pantoprazole    Tablet 40 milliGRAM(s) Oral before breakfast  piperacillin/tazobactam IVPB. 3.375 Gram(s) IV Intermittent every 8 hours  polyethylene glycol 3350 17 Gram(s) Oral daily  predniSONE   Tablet 10 milliGRAM(s) Oral daily  senna 2 Tablet(s) Oral at bedtime  simvastatin 20 milliGRAM(s) Oral at bedtime  sodium chloride 3%  Inhalation 3 milliLiter(s) Inhalation two times a day  traZODone 50 milliGRAM(s) Oral at bedtime  vancomycin  IVPB 500 milliGRAM(s) IV Intermittent every 24 hours    MEDICATIONS  (PRN):  guaiFENesin   Syrup  (Sugar-Free) 200 milliGRAM(s) Oral every 6 hours PRN Cough  HYDROmorphone  Injectable 1 milliGRAM(s) IV Push every 4 hours PRN Severe Pain (7 - 10)  naloxone Injectable 0.1 milliGRAM(s) IV Push every 3 minutes PRN For ANY of the following changes in patient status:  A. RR LESS THAN 10 breaths per minute, B. Oxygen saturation LESS THAN 90%, C. Sedation score of 6    PRESENT SYMPTOMS: [ ]Unable to obtain due to poor mentation   Source if other than patient:  [ ]Family   [ ]Team     Pain (Impact on QOL):  Patient complaining of pain in her right leg, which is chronic, she has had it for many years. In the past she was taking Tramadol and naproxen which was helping her pain, but since her admission to the hospital her pain has worsened and Dilaudid IV only relieves her pain a little. She describes the pain as throbbing and aching but very severe.     Dyspnea:  Yes [x ] No [ ] - [ ]Mild [x ]Moderate [ ]Severe  Anxiety:    Yes [ ] No [x ] - [ ]Mild [ ]Moderate [ ]Severe  Fatigue:    Yes [ ] No [x ] - [ ]Mild [ ]Moderate [ ]Severe  Nausea:    Yes [ ] No [ ] - [ ]Mild [ ]Moderate [ ]Severe                         Loss of appetite: Yes [ ] No [ ] - [ ]Mild [ ]Moderate [ ]Severe             Constipation:  Yes [ ] No [ ] - [ ]Mild [ ]Moderate [ ]Severe    Other Symptoms:  [ ]All other review of systems negative     Karnofsky Performance Score/Palliative Performance Status Version 2:         50%    http://palliative.info/resource_material/PPSv2.pdf  PHYSICAL EXAM:  Vital Signs Last 24 Hrs  T(C): 38 (2019 11:00), Max: 38 (2019 20:00)  T(F): 100.4 (2019 11:00), Max: 100.4 (2019 20:00)  HR: 132 (2019 11:29) (118 - 132)  BP: 127/45 (2019 11:00) (74/50 - 160/119)  BP(mean): 62 (2019 11:00) (56 - 127)  RR: 20 (2019 11:29) (18 - 34)  SpO2: 90% (2019 11:29) (86% - 95%) I&O's Summary    2019 07:  -  2019 07:00  --------------------------------------------------------  IN: 300 mL / OUT: 450 mL / NET: -150 mL    2019 07:  -  2019 12:44  --------------------------------------------------------  IN: 0 mL / OUT: 200 mL / NET: -200 mL    GENERAL:  [x ]Alert  [x ]Oriented x3   [ ]Lethargic  [ ]Cachexia  [ ]Unarousable  [ ]Verbal  [ ]Non-Verbal  Behavioral:   [ ] Anxiety  [ ] Delirium [ ] Agitation [x ] Other  HEENT:  [ ]Normal   [x ]Dry mouth   [ ]ET Tube/Trach  [ ]Oral lesions [x] Hi Flow NC  PULMONARY:   [x ]Clear anteriorly  [ ]Tachypnea  [ ]Audible excessive secretions   [ ]Rhonchi        [ ]Right [ ]Left [ ]Bilateral  [ ]Crackles        [ ]Right [ ]Left [ ]Bilateral  [ ]Wheezing     [ ]Right [ ]Left [ ]Bilateral  CARDIOVASCULAR:    [x ]Regular [ ]Irregular [ ]Tachy  [ ]Ubaldo [ ]Murmur [ ]Other  GASTROINTESTINAL:  [ ]Soft  [ ]Distended   [ ]+BS  [ ]Non tender [ ]Tender  [ ]PEG [ ]OGT/ NGT [x] surgical wound  Last BM: 18  GENITOURINARY:  [ x]Normal [ ] Incontinent   [ ]Oliguria/Anuria   [ ]Antoine  MUSCULOSKELETAL:   [ ]Normal   [ x]Weakness  [ ]Bed/Wheelchair bound [ ]Edema  NEUROLOGIC:   [ x]No focal deficits  [ ] Cognitive impairment  [ ] Dysphagia [ ]Dysarthria [ ] Paresis [ ]Other   SKIN:   [ ]Normal   [ ]Pressure ulcer(s)  [ ]Rash [x] surgical wound to abdomen    LABS:                        9.2    9.23  )-----------( 387      ( 2019 03:00 )             31.3   01-    140  |  96<L>  |  21  ----------------------------<  121<H>  3.8   |  30  |  1.25    Ca    8.9      2019 03:00  Phos  4.4       Mg     1.8         TPro  6.7  /  Alb  3.2<L>  /  TBili  0.3  /  DBili  x   /  AST  15  /  ALT  14  /  AlkPhos  96        Urinalysis Basic - ( 2019 16:15 )    Color: YELLOW / Appearance: CLEAR / S.023 / pH: 6.0  Gluc: NEGATIVE / Ketone: NEGATIVE  / Bili: NEGATIVE / Urobili: NORMAL   Blood: NEGATIVE / Protein: 30 / Nitrite: NEGATIVE   Leuk Esterase: SMALL / RBC: 3-5 / WBC 6-10   Sq Epi: FEW / Non Sq Epi: x / Bacteria: NEGATIVE      RADIOLOGY & ADDITIONAL STUDIES:  < from: Xray Chest 1 View- PORTABLE-Urgent (19 @ 16:44) >  EXAM:  XR CHEST PORTABLE URGENT 1V        PROCEDURE DATE:  2019 IMPRESSION:  No significant changein bilateral patchy opacities which   could be due to multifocal pneumonia.    Continued left retrocardiac opacity which could be due to atelectasis   and/or pneumonia. A small left pleural effusion with passive atelectasis   is also possible.    < end of copied text >      PROTEIN CALORIE MALNUTRITION PRESENT: [ ] Yes [ x] No  [ ] PPSV2 < or = to 30% [ ] significant weight loss  [ ] poor nutritional intake [ ] catabolic state [ ] anasarca     Albumin, Serum: 3.2 g/dL (19 @ 03:00)      REFERRALS:   [ ]Chaplaincy  [ ] Hospice  [ ]Child Life  [ ]Social Work  [ ]Case management [ ]Holistic Therapy   Goals of Care Discussion Document:

## 2019-01-31 NOTE — PROGRESS NOTE ADULT - ATTENDING COMMENTS
Brittany Mario  Pager: [phone]. If no response or past 5 pm call [phone].     My colleague will cover starting 2/1-2/3

## 2019-01-31 NOTE — PROGRESS NOTE ADULT - SUBJECTIVE AND OBJECTIVE BOX
INTERVAL HPI/OVERNIGHT EVENTS:  Overnight: no events  SUBJECTIVE: Patient seen and examined at bedside. Patient reports feeling better compared to yesterday but she still have her baseline abdominal and leg pain    ROS:  CONSTITUTIONAL: No  fevers or chills  RESPIRATORY:cough, shortness of breath  CARDIOVASCULAR: No chest pain or palpitations  GASTROINTESTINAL:  abdominalpain. No nausea, vomiting, or hematemesis  GENITOURINARY: No dysuria, frequency or hematuria      OBJECTIVE:    VITAL SIGNS:  ICU Vital Signs Last 24 Hrs  T(C): 37.4 (2019 04:00), Max: 38 (2019 20:00)  T(F): 99.3 (2019 04:00), Max: 100.4 (2019 20:00)  HR: 125 (2019 07:19) (118 - 134)  BP: 107/79 (2019 05:00) (74/49 - 166/141)  BP(mean): 85 (2019 05:00) (55 - 147)  ABP: --  ABP(mean): --  RR: 27 (2019 06:00) (18 - 34)  SpO2: 90% (2019 07:19) (88% - 95%)    Mode: NIV (Noninvasive Ventilation), RR (machine): 18, TV (machine): 400, FiO2: 100, PEEP: 8, ITime: 1, MAP: , PIP: 15     @ 07:01  -  -31 @ 07:00  --------------------------------------------------------  IN: 300 mL / OUT: 450 mL / NET: -150 mL      CAPILLARY BLOOD GLUCOSE      POCT Blood Glucose.: 155 mg/dL (2019 06:24)      PHYSICAL EXAM:  GENERAL: NAD  EYES:  conjunctiva and sclera clear  CHEST/LUNG: Clear to auscultation bilaterally in anterior regions   HEART: Regular rate and rhythm; No murmurs, rubs, or gallops  ABDOMEN: Soft, tender, Nondistended; Bowel sounds present; open wound covered on guaze   EXTREMITIES:  2+ Peripheral Pulses  PSYCH: AAOx3      MEDICATIONS:  MEDICATIONS  (STANDING):  ALBUTerol    0.083% 2.5 milliGRAM(s) Nebulizer every 6 hours  amLODIPine   Tablet 10 milliGRAM(s) Oral daily  buDESOnide 160 MICROgram(s)/formoterol 4.5 MICROgram(s) Inhaler 2 Puff(s) Inhalation two times a day  collagenase Ointment 1 Application(s) Topical daily  docusate sodium 100 milliGRAM(s) Oral two times a day  gabapentin 400 milliGRAM(s) Oral three times a day  heparin  Injectable 7500 Unit(s) SubCutaneous every 8 hours  influenza   Vaccine 0.5 milliLiter(s) IntraMuscular once  insulin glargine Injectable (LANTUS) 22 Unit(s) SubCutaneous at bedtime  insulin lispro (HumaLOG) corrective regimen sliding scale   SubCutaneous every 6 hours  levothyroxine 137 MICROGram(s) Oral daily  metoprolol tartrate 25 milliGRAM(s) Enteral Tube two times a day  metoprolol tartrate Injectable 5 milliGRAM(s) IV Push once  pantoprazole    Tablet 40 milliGRAM(s) Oral before breakfast  piperacillin/tazobactam IVPB. 3.375 Gram(s) IV Intermittent every 8 hours  polyethylene glycol 3350 17 Gram(s) Oral daily  predniSONE   Tablet 10 milliGRAM(s) Oral daily  senna 2 Tablet(s) Oral at bedtime  simvastatin 20 milliGRAM(s) Oral at bedtime  sodium chloride 3%  Inhalation 3 milliLiter(s) Inhalation two times a day  traZODone 50 milliGRAM(s) Oral at bedtime  vancomycin  IVPB 500 milliGRAM(s) IV Intermittent every 24 hours    MEDICATIONS  (PRN):  guaiFENesin   Syrup  (Sugar-Free) 200 milliGRAM(s) Oral every 6 hours PRN Cough  HYDROmorphone  Injectable 1 milliGRAM(s) IV Push every 4 hours PRN Severe Pain (7 - 10)  naloxone Injectable 0.1 milliGRAM(s) IV Push every 3 minutes PRN For ANY of the following changes in patient status:  A. RR LESS THAN 10 breaths per minute, B. Oxygen saturation LESS THAN 90%, C. Sedation score of 6      ALLERGIES:  Allergies    No Known Allergies    Intolerances    Seroquel (Other)      LABS:                        9.2    9.23  )-----------( 387      ( 2019 03:00 )             31.3     01-30    140  |  96<L>  |  21  ----------------------------<  121<H>  3.8   |  30  |  1.25    Ca    8.9      2019 03:00  Phos  4.4       Mg     1.8         TPro  6.7  /  Alb  3.2<L>  /  TBili  0.3  /  DBili  x   /  AST  15  /  ALT  14  /  AlkPhos  96        Urinalysis Basic - ( 2019 16:15 )    Color: YELLOW / Appearance: CLEAR / S.023 / pH: 6.0  Gluc: NEGATIVE / Ketone: NEGATIVE  / Bili: NEGATIVE / Urobili: NORMAL   Blood: NEGATIVE / Protein: 30 / Nitrite: NEGATIVE   Leuk Esterase: SMALL / RBC: 3-5 / WBC 6-10   Sq Epi: FEW / Non Sq Epi: x / Bacteria: NEGATIVE        RADIOLOGY & ADDITIONAL TESTS: Reviewed.

## 2019-01-31 NOTE — CONSULT NOTE ADULT - PROBLEM SELECTOR RECOMMENDATION 9
Patient dealing with RA for most of her life, causing lots of pain. Continue care as per MICU team,.

## 2019-01-31 NOTE — PROGRESS NOTE ADULT - ASSESSMENT
63 f with DM, COPD, RA on chronic steroids, presented initially with SBO, underwent ex lap 12/15 closed with 4 retention sutures, evicerated on 12/30 and closed with 6 more retention sutures, c/b multifocal pneumonia, developed hypercapnic respiratory failure requiring intubation 1/6, s/p bronch 1/9/18, c/b Enterococci bacteremia, extubated and transferred to floor, still on vanco but developed fever and elevated WBC    new fever and respiratory failure ? etiology.   All cultures negative to date.    enterococcal bacteremia likely intra-abdominal source, cleared on vanco         Plan:   * consider repeat blood cx   * chest/abd/pelvis CT with contrast to look of possible intra-abdominal collections.   * c/w vanco 500 q 12 and monitor the trough, last trough high at 23, decrease dose to 750 mg iv q24h   * c/w zosyn 3.375 q 8

## 2019-01-31 NOTE — CONSULT NOTE ADULT - PROBLEM SELECTOR RECOMMENDATION 6
Discussion had with patients son, who is overwhelmed with the prolong hospitalization and complication that his mother had to go through. Family meeting to with son and his sister on 2/1/19 at 1pm. Discussion had with patients son, who is overwhelmed with the prolong hospitalization and complication that his mother had to go through. Family meeting to with son and his sister on 2/1/19 at 1pm. Emotional support provided to the son and patient. Will continue to manage patient symptoms.

## 2019-01-31 NOTE — PROGRESS NOTE ADULT - SUBJECTIVE AND OBJECTIVE BOX
63y old  Female who presents with a chief complaint of SBO, respiratory failure (31 Jan 2019 09:45)      Interval history:  Febrile, complains of abdominal pain and rt leg pain especially knee and ankle, + cough.       No Known Allergies  Seroquel (Other)      Antimicrobials:  piperacillin/tazobactam IVPB. 3.375 Gram(s) IV Intermittent every 8 hours  vancomycin  IVPB 500 milliGRAM(s) IV Intermittent every 24 hours    REVIEW OF SYSTEMS:  No chest pain   No N/V  No rash.     Vital Signs Last 24 Hrs  T(C): 38.1 (01-31-19 @ 12:00), Max: 38.1 (01-31-19 @ 12:00)  T(F): 100.5 (01-31-19 @ 12:00), Max: 100.5 (01-31-19 @ 12:00)  HR: 130 (01-31-19 @ 17:59) (110 - 134)  BP: 125/75 (01-31-19 @ 17:52) (95/62 - 184/98)  BP(mean): 88 (01-31-19 @ 17:52) (62 - 140)  RR: 25 (01-31-19 @ 17:52) (14 - 34)  SpO2: 90% (01-31-19 @ 17:59) (62% - 92%)      PHYSICAL EXAM:  Patient awake, alert, communicative.   on high flow.   tachycardic, ? + murmur,   + air entry B/L lung fields.  Gastrointestinal: soft, midabdominal dressing  Extremities: no edema. chronic skin changes, lt leg with ? prior graft site     IV sites not inflamed.                             9.2    9.23  )-----------( 387      ( 30 Jan 2019 03:00 )             31.3   01-30    140  |  96<L>  |  21  ----------------------------<  121<H>  3.8   |  30  |  1.25    Ca    8.9      30 Jan 2019 03:00  Phos  4.4     01-30  Mg     1.8     01-30    TPro  6.7  /  Alb  3.2<L>  /  TBili  0.3  /  DBili  x   /  AST  15  /  ALT  14  /  AlkPhos  96  01-30      LIVER FUNCTIONS - ( 30 Jan 2019 03:00 )  Alb: 3.2 g/dL / Pro: 6.7 g/dL / ALK PHOS: 96 u/L / ALT: 14 u/L / AST: 15 u/L / GGT: x               Culture - Respiratory with Gram Stain (collected 30 Jan 2019 08:39)  Source: SPUTUM  Preliminary Report (31 Jan 2019 15:10):    NRF^Normal Respiratory [place]    QUANTITY OF GROWTH: RARE    Culture - Blood (collected 29 Jan 2019 22:40)  Source: BLOOD PERIPHERAL  Preliminary Report (30 Jan 2019 22:38):    NO ORGANISMS ISOLATED    NO ORGANISMS ISOLATED AT 24 HOURS    Culture - Blood (collected 29 Jan 2019 19:58)  Source: BLOOD PERIPHERAL  Preliminary Report (30 Jan 2019 19:56):    NO ORGANISMS ISOLATED    NO ORGANISMS ISOLATED AT 24 HOURS      Radiology:  < from: US Duplex Venous Lower Ext Complete, Bilateral (01.30.19 @ 13:00) >  IMPRESSION:     No evidence of bilateral lower extremity deep venous thrombosis in the   visualized veins. The calf veins are not visualized.

## 2019-01-31 NOTE — PROGRESS NOTE ADULT - ASSESSMENT
63F w/ multiple respiratory comorbidities p/w SBO s/p ex-lap with findings of healthy bowel c/b evisceration of bowel requiring placement of retention sutures, difficult to wean off ventilation, MATEO done 01/23 to evaluate endocarditis, patient extubated and downgraded to RCU 01/26 but readmitted to MICU due to hypoxic respiratory now on AVAPS.     Plan  - The loose retention sutures in the middle were taken out   - Patient on vancomycin for enterococcal bacteremia, but has been febrile x 2 and tachycardic, ID reconsulted and patient was started on Zosyn 01/29, blood culture sent   - Recommend CT chest, abdomen and pelvis with IV contrast once patient is stable for transport   - Continue BID dressing changes to midline wound.   - Surgery will continue to follow

## 2019-01-31 NOTE — CONSULT NOTE ADULT - PROBLEM SELECTOR RECOMMENDATION 3
Patient with multiple intubations and extubations now on Hi-Flow, most likely secondary to possible PNA. Continue care as per MICU team.

## 2019-01-31 NOTE — CHART NOTE - NSCHARTNOTEFT_GEN_A_CORE
NUTRITION FOLLOW-UP:    Pt referred for nutrition consult 2/2 Assessment.  Pt is currently NPO 2/2 BiPAP/CPAP.  Pt previously seen by RDN on 1/28- pt was on a Dysphagnia 1 [place] Consistency diet-recommendation for Consistent Carbohydrate Low Na.  Pt was transferred to MICU 2 days ago for hypoxic respiratory failure.  Noted pt wt status is 14kg below admission wt.  Pt remains with 2+ generalized edema.  Pt states she does have an appetite.  RN states that she is tolerating H2O.  Pt denies difficulty swallowing.      Weight:  1/30 - 98.2kg     1/27 - 92kg     1/26 - 95.8     Adm - 112.2kg     IBW - 47.7kg  Labs:  H/H 9.2/31.3  Glu 121  FS      Current Diet: NPO    Nutrition Recommendations:  Pt now NPO x 2d.  Would suggest once pt eligible for oral nutrition to be repeat swallow evaluation to determine appropriate food/beverage consistencies for safe intake.  If diet is started, consider additional orders for consistent carbohydrate and Low Na modifications.  Estimated kcal needs   = 1300 kcal/d  Estimated protein needs = 95gms/d.    RD to Remain Available:  yes    Additional Recommendations:   1) Monitor weights, labs, BM's, skin integrity, FS

## 2019-01-31 NOTE — CONSULT NOTE ADULT - PROBLEM SELECTOR RECOMMENDATION 4
Patient presented initially SBO s/p ex lap with lysis of adhesions (4 retention sutures) c/b 12/30 she eviscerated 2/2 a coughing episode s/p ex lap (6 retention sutures) c/b multifocal pneumonia, which required Intubation and now currently extubated on hi flow. Continue care as per MICU team.

## 2019-01-31 NOTE — CONSULT NOTE ADULT - ATTENDING COMMENTS
Pt seen with NP.  Agree with above.  Prolonged hospital course with complications.  Now with respiratory failure. Start oxycontin for pain. FM at 1pm to discuss goc and AD

## 2019-01-31 NOTE — PROGRESS NOTE ADULT - ASSESSMENT
Ms. Morrow is a 63 year old female with PMHx of HTN, DM, hypothyroidism, RA (prednisone 10mg), pulmonary HTN, CHRIS, and COPD (3L at home) who was originally admitted on 12/15 for SBO s/p ex lap with lysis of adhesions (4 retention sutures) c/b 12/30 she eviscerated 2/2 a coughing episode s/p ex lap (6 retention sutures) c/b multifocal pneumonia, developed hypercapnic respiratory failure requiring intubation 1/6, s/p bronch 1/9/18, and extubated on 1/24, course further c/b Enterococci bacteremia now readmitted to MICU for hypoxic respiratory failure 2/2 atelectasis.    #Neuro  -no active issues  - Pain control with Dilaudid     #Respiratory   Hypoxic Respiratory Failure 2/2 atelectasis.   - Continue AVAPS (previously high flow NC during the day AVAPs at night) for positive pressure   - Continue Spiriva & Symbicort. Duoneb PRN for COPD    #Cardiovascular   - Continue amlodipine 10mg, furosemide 40mg, and metoprolol 25mg BID    - Simvastatin 20mg     #GI  SBO s/p ex-lap with findings of healthy bowel c/b evisceration of bowel requiring placement of retention sutures  - NPO for now; continue feeds when oxygen requirement stable.   - Continue Protonix  - Continue bowel regimen     #Renal/Metabolic  - Monitor for electrolyte derangements while receiving diuretics.     #ID  Enterococci bacteremia  - Vancomycin D20  Due to fever of unknown origin, antibiotics were broadened for gram negative coverage- Zosyn D3  Repeat infectious work up negative: UA, RVP  - Pending blood cultures     #Endocrine  Diabetes Mellitus Type 2  - Lantus 22U bedtime and ISS  - Holding premeal insulin while NPO   - Gabapentin 400 TID   Rheumatoid arthritis   - Continue prednisone 10mg   Hypothyroidism   - Levothyroxine 137     #DVT ppx  - Heparin SQ     #Skin  - PIV access.

## 2019-01-31 NOTE — CONSULT NOTE ADULT - ASSESSMENT
63 year old 63 year old woman with Rheumatoid arthritis, pain, Respiratory failure, SBO, Debility and encounter for palliative care.

## 2019-01-31 NOTE — PROGRESS NOTE ADULT - ATTENDING COMMENTS
Patient examined and case reviewed in detail on bedside rounds  Critically ill on NIV with multifactorial respiratory failure  Frequent bedside visits with therapy change today.   Crit Care Time Today 35 min+

## 2019-01-31 NOTE — CONSULT NOTE ADULT - PROBLEM SELECTOR RECOMMENDATION 2
Patients pain stems from her Rheumatoid Arthritis, in the past she was taking Tramadol and naproxen. Currently she is on Dilaudid 1mg q4h IV PRN and requiring multiple PRNS. Would initiate Oxycontin 10mg q12h and continue PRNs.

## 2019-01-31 NOTE — PROGRESS NOTE ADULT - SUBJECTIVE AND OBJECTIVE BOX
B-Team Surgery Progress Note     Subjective/24hour Events: patient on AVAPS for respiratory failure, satting around 88- 91% this morning. still febrile as of yesterday morning (8am)     Vital Signs Last 24 Hrs  T(C): 38.2 (30 Jan 2019 08:00), Max: 38.2 (30 Jan 2019 08:00)  T(F): 100.7 (30 Jan 2019 08:00), Max: 100.7 (30 Jan 2019 08:00)  HR: 125 (30 Jan 2019 08:00) (102 - 125)  BP: 111/64 (30 Jan 2019 08:00) (86/56 - 181/154)  BP(mean): 73 (30 Jan 2019 08:00) (62 - 159)  RR: 30 (30 Jan 2019 08:00) (17 - 37)  SpO2: 87% (30 Jan 2019 08:00) (84% - 97%)    Physical Exam:  Gen: NAD, awake and interactive   Pulm: on AVAPS satting at 91%  Card: pulse regularly but tachycardic and vital sign stable per monitoring   GI: Obese, soft, non-distended, non-tender, with fibrinous exudate over granulation tissue, dressing changed this morning

## 2019-02-01 DIAGNOSIS — R53.2 FUNCTIONAL QUADRIPLEGIA: ICD-10-CM

## 2019-02-01 DIAGNOSIS — R50.9 FEVER, UNSPECIFIED: ICD-10-CM

## 2019-02-01 DIAGNOSIS — Z51.81 ENCOUNTER FOR THERAPEUTIC DRUG LEVEL MONITORING: ICD-10-CM

## 2019-02-01 DIAGNOSIS — M06.9 RHEUMATOID ARTHRITIS, UNSPECIFIED: ICD-10-CM

## 2019-02-01 DIAGNOSIS — J96.00 ACUTE RESPIRATORY FAILURE, UNSPECIFIED WHETHER WITH HYPOXIA OR HYPERCAPNIA: ICD-10-CM

## 2019-02-01 DIAGNOSIS — R78.81 BACTEREMIA: ICD-10-CM

## 2019-02-01 LAB
ALBUMIN SERPL ELPH-MCNC: 2.3 G/DL — LOW (ref 3.3–5)
ALP SERPL-CCNC: 101 U/L — SIGNIFICANT CHANGE UP (ref 40–120)
ALT FLD-CCNC: < 4 U/L — LOW (ref 4–33)
ANION GAP SERPL CALC-SCNC: 21 MMO/L — HIGH (ref 7–14)
ANION GAP SERPL CALC-SCNC: 23 MMO/L — HIGH (ref 7–14)
AST SERPL-CCNC: < 4 U/L — LOW (ref 4–32)
BACTERIA SPT RESP CULT: SIGNIFICANT CHANGE UP
BASE EXCESS BLDA CALC-SCNC: -1.6 MMOL/L — SIGNIFICANT CHANGE UP
BASOPHILS # BLD AUTO: 0.01 K/UL — SIGNIFICANT CHANGE UP (ref 0–0.2)
BASOPHILS # BLD AUTO: 0.1 K/UL — SIGNIFICANT CHANGE UP (ref 0–0.2)
BASOPHILS NFR BLD AUTO: 0.1 % — SIGNIFICANT CHANGE UP (ref 0–2)
BASOPHILS NFR BLD AUTO: 0.8 % — SIGNIFICANT CHANGE UP (ref 0–2)
BILIRUB SERPL-MCNC: 0.2 MG/DL — SIGNIFICANT CHANGE UP (ref 0.2–1.2)
BUN SERPL-MCNC: 20 MG/DL — SIGNIFICANT CHANGE UP (ref 7–23)
BUN SERPL-MCNC: 21 MG/DL — SIGNIFICANT CHANGE UP (ref 7–23)
CALCIUM SERPL-MCNC: 7.8 MG/DL — LOW (ref 8.4–10.5)
CALCIUM SERPL-MCNC: 8.2 MG/DL — LOW (ref 8.4–10.5)
CHLORIDE SERPL-SCNC: 93 MMOL/L — LOW (ref 98–107)
CHLORIDE SERPL-SCNC: 95 MMOL/L — LOW (ref 98–107)
CK MB BLD-MCNC: < 1 NG/ML — LOW (ref 1–4.7)
CK MB BLD-MCNC: SIGNIFICANT CHANGE UP (ref 0–2.5)
CK SERPL-CCNC: 29 U/L — SIGNIFICANT CHANGE UP (ref 25–170)
CO2 SERPL-SCNC: 22 MMOL/L — SIGNIFICANT CHANGE UP (ref 22–31)
CO2 SERPL-SCNC: 22 MMOL/L — SIGNIFICANT CHANGE UP (ref 22–31)
CREAT SERPL-MCNC: 1.16 MG/DL — SIGNIFICANT CHANGE UP (ref 0.5–1.3)
CREAT SERPL-MCNC: 1.22 MG/DL — SIGNIFICANT CHANGE UP (ref 0.5–1.3)
EOSINOPHIL # BLD AUTO: 0 K/UL — SIGNIFICANT CHANGE UP (ref 0–0.5)
EOSINOPHIL # BLD AUTO: 0.52 K/UL — HIGH (ref 0–0.5)
EOSINOPHIL NFR BLD AUTO: 0 % — SIGNIFICANT CHANGE UP (ref 0–6)
EOSINOPHIL NFR BLD AUTO: 4.4 % — SIGNIFICANT CHANGE UP (ref 0–6)
GLUCOSE BLDC GLUCOMTR-MCNC: 233 MG/DL — HIGH (ref 70–99)
GLUCOSE BLDC GLUCOMTR-MCNC: 236 MG/DL — HIGH (ref 70–99)
GLUCOSE BLDC GLUCOMTR-MCNC: 272 MG/DL — HIGH (ref 70–99)
GLUCOSE BLDC GLUCOMTR-MCNC: 294 MG/DL — HIGH (ref 70–99)
GLUCOSE BLDC GLUCOMTR-MCNC: 304 MG/DL — HIGH (ref 70–99)
GLUCOSE SERPL-MCNC: 241 MG/DL — HIGH (ref 70–99)
GLUCOSE SERPL-MCNC: 246 MG/DL — HIGH (ref 70–99)
HCO3 BLDA-SCNC: 23 MMOL/L — SIGNIFICANT CHANGE UP (ref 22–26)
HCT VFR BLD CALC: 21.5 % — LOW (ref 34.5–45)
HCT VFR BLD CALC: 30.7 % — LOW (ref 34.5–45)
HCT VFR BLD CALC: 31 % — LOW (ref 34.5–45)
HGB BLD-MCNC: 6.9 G/DL — CRITICAL LOW (ref 11.5–15.5)
HGB BLD-MCNC: 9.4 G/DL — LOW (ref 11.5–15.5)
HGB BLD-MCNC: 9.6 G/DL — LOW (ref 11.5–15.5)
IMM GRANULOCYTES NFR BLD AUTO: 0.3 % — SIGNIFICANT CHANGE UP (ref 0–1.5)
IMM GRANULOCYTES NFR BLD AUTO: 2.7 % — HIGH (ref 0–1.5)
LYMPHOCYTES # BLD AUTO: 0.27 K/UL — LOW (ref 1–3.3)
LYMPHOCYTES # BLD AUTO: 1.9 % — LOW (ref 13–44)
LYMPHOCYTES # BLD AUTO: 19.6 % — SIGNIFICANT CHANGE UP (ref 13–44)
LYMPHOCYTES # BLD AUTO: 2.34 K/UL — SIGNIFICANT CHANGE UP (ref 1–3.3)
MAGNESIUM SERPL-MCNC: 1.9 MG/DL — SIGNIFICANT CHANGE UP (ref 1.6–2.6)
MAGNESIUM SERPL-MCNC: 1.9 MG/DL — SIGNIFICANT CHANGE UP (ref 1.6–2.6)
MCHC RBC-ENTMCNC: 30.6 % — LOW (ref 32–36)
MCHC RBC-ENTMCNC: 30.7 PG — SIGNIFICANT CHANGE UP (ref 27–34)
MCHC RBC-ENTMCNC: 31 % — LOW (ref 32–36)
MCHC RBC-ENTMCNC: 31.1 PG — SIGNIFICANT CHANGE UP (ref 27–34)
MCHC RBC-ENTMCNC: 31.5 PG — SIGNIFICANT CHANGE UP (ref 27–34)
MCHC RBC-ENTMCNC: 32.1 % — SIGNIFICANT CHANGE UP (ref 32–36)
MCV RBC AUTO: 101.6 FL — HIGH (ref 80–100)
MCV RBC AUTO: 101.7 FL — HIGH (ref 80–100)
MCV RBC AUTO: 95.6 FL — SIGNIFICANT CHANGE UP (ref 80–100)
MONOCYTES # BLD AUTO: 0.46 K/UL — SIGNIFICANT CHANGE UP (ref 0–0.9)
MONOCYTES # BLD AUTO: 1.58 K/UL — HIGH (ref 0–0.9)
MONOCYTES NFR BLD AUTO: 13.2 % — SIGNIFICANT CHANGE UP (ref 2–14)
MONOCYTES NFR BLD AUTO: 3.2 % — SIGNIFICANT CHANGE UP (ref 2–14)
NEUTROPHILS # BLD AUTO: 13.63 K/UL — HIGH (ref 1.8–7.4)
NEUTROPHILS # BLD AUTO: 7.07 K/UL — SIGNIFICANT CHANGE UP (ref 1.8–7.4)
NEUTROPHILS NFR BLD AUTO: 59.3 % — SIGNIFICANT CHANGE UP (ref 43–77)
NEUTROPHILS NFR BLD AUTO: 94.5 % — HIGH (ref 43–77)
NRBC # FLD: 0 K/UL — LOW (ref 25–125)
NRBC # FLD: 0.18 K/UL — LOW (ref 25–125)
NRBC # FLD: 0.2 K/UL — LOW (ref 25–125)
NRBC FLD-RTO: 1.5 — SIGNIFICANT CHANGE UP
NRBC FLD-RTO: 1.6 — SIGNIFICANT CHANGE UP
NRBC FLD-RTO: SIGNIFICANT CHANGE UP
PCO2 BLDA: 45 MMHG — SIGNIFICANT CHANGE UP (ref 32–48)
PH BLDA: 7.34 PH — LOW (ref 7.35–7.45)
PHOSPHATE SERPL-MCNC: 3.3 MG/DL — SIGNIFICANT CHANGE UP (ref 2.5–4.5)
PHOSPHATE SERPL-MCNC: 3.3 MG/DL — SIGNIFICANT CHANGE UP (ref 2.5–4.5)
PLATELET # BLD AUTO: 298 K/UL — SIGNIFICANT CHANGE UP (ref 150–400)
PLATELET # BLD AUTO: 458 K/UL — HIGH (ref 150–400)
PLATELET # BLD AUTO: 486 K/UL — HIGH (ref 150–400)
PMV BLD: 10 FL — SIGNIFICANT CHANGE UP (ref 7–13)
PMV BLD: 10 FL — SIGNIFICANT CHANGE UP (ref 7–13)
PMV BLD: 10.3 FL — SIGNIFICANT CHANGE UP (ref 7–13)
PO2 BLDA: 61 MMHG — LOW (ref 83–108)
POTASSIUM SERPL-MCNC: 3 MMOL/L — LOW (ref 3.5–5.3)
POTASSIUM SERPL-MCNC: 3 MMOL/L — LOW (ref 3.5–5.3)
POTASSIUM SERPL-SCNC: 3 MMOL/L — LOW (ref 3.5–5.3)
POTASSIUM SERPL-SCNC: 3 MMOL/L — LOW (ref 3.5–5.3)
PROT SERPL-MCNC: 6.3 G/DL — SIGNIFICANT CHANGE UP (ref 6–8.3)
RBC # BLD: 2.25 M/UL — LOW (ref 3.8–5.2)
RBC # BLD: 3.02 M/UL — LOW (ref 3.8–5.2)
RBC # BLD: 3.05 M/UL — LOW (ref 3.8–5.2)
RBC # FLD: 16.1 % — HIGH (ref 10.3–14.5)
RBC # FLD: 16.1 % — HIGH (ref 10.3–14.5)
RBC # FLD: 17.9 % — HIGH (ref 10.3–14.5)
SAO2 % BLDA: 88.8 % — LOW (ref 95–99)
SODIUM SERPL-SCNC: 138 MMOL/L — SIGNIFICANT CHANGE UP (ref 135–145)
SODIUM SERPL-SCNC: 138 MMOL/L — SIGNIFICANT CHANGE UP (ref 135–145)
TROPONIN T, HIGH SENSITIVITY: 44 NG/L — SIGNIFICANT CHANGE UP (ref ?–14)
VANCOMYCIN TROUGH SERPL-MCNC: 13.8 UG/ML — SIGNIFICANT CHANGE UP (ref 10–20)
WBC # BLD: 11.93 K/UL — HIGH (ref 3.8–10.5)
WBC # BLD: 12.86 K/UL — HIGH (ref 3.8–10.5)
WBC # BLD: 14.42 K/UL — HIGH (ref 3.8–10.5)
WBC # FLD AUTO: 11.93 K/UL — HIGH (ref 3.8–10.5)
WBC # FLD AUTO: 12.86 K/UL — HIGH (ref 3.8–10.5)
WBC # FLD AUTO: 14.42 K/UL — HIGH (ref 3.8–10.5)

## 2019-02-01 PROCEDURE — 99232 SBSQ HOSP IP/OBS MODERATE 35: CPT

## 2019-02-01 PROCEDURE — 99233 SBSQ HOSP IP/OBS HIGH 50: CPT | Mod: GC

## 2019-02-01 PROCEDURE — 99291 CRITICAL CARE FIRST HOUR: CPT

## 2019-02-01 RX ORDER — NOREPINEPHRINE BITARTRATE/D5W 8 MG/250ML
0.05 PLASTIC BAG, INJECTION (ML) INTRAVENOUS
Qty: 16 | Refills: 0 | Status: DISCONTINUED | OUTPATIENT
Start: 2019-02-01 | End: 2019-02-07

## 2019-02-01 RX ORDER — POTASSIUM CHLORIDE 20 MEQ
20 PACKET (EA) ORAL
Qty: 0 | Refills: 0 | Status: COMPLETED | OUTPATIENT
Start: 2019-02-01 | End: 2019-02-01

## 2019-02-01 RX ORDER — CHLORHEXIDINE GLUCONATE 213 G/1000ML
15 SOLUTION TOPICAL EVERY 12 HOURS
Qty: 0 | Refills: 0 | Status: DISCONTINUED | OUTPATIENT
Start: 2019-02-01 | End: 2019-02-08

## 2019-02-01 RX ORDER — INSULIN GLARGINE 100 [IU]/ML
22 INJECTION, SOLUTION SUBCUTANEOUS AT BEDTIME
Qty: 0 | Refills: 0 | Status: DISCONTINUED | OUTPATIENT
Start: 2019-02-01 | End: 2019-02-02

## 2019-02-01 RX ORDER — ACETAMINOPHEN 500 MG
650 TABLET ORAL EVERY 6 HOURS
Qty: 0 | Refills: 0 | Status: DISCONTINUED | OUTPATIENT
Start: 2019-02-01 | End: 2019-02-01

## 2019-02-01 RX ORDER — ACETAMINOPHEN 500 MG
650 TABLET ORAL ONCE
Qty: 0 | Refills: 0 | Status: COMPLETED | OUTPATIENT
Start: 2019-02-01 | End: 2019-02-01

## 2019-02-01 RX ADMIN — FENTANYL CITRATE 5.61 MICROGRAM(S)/KG/HR: 50 INJECTION INTRAVENOUS at 06:37

## 2019-02-01 RX ADMIN — Medication 650 MILLIGRAM(S): at 18:10

## 2019-02-01 RX ADMIN — INSULIN GLARGINE 22 UNIT(S): 100 INJECTION, SOLUTION SUBCUTANEOUS at 22:01

## 2019-02-01 RX ADMIN — FENTANYL CITRATE 5.61 MICROGRAM(S)/KG/HR: 50 INJECTION INTRAVENOUS at 08:24

## 2019-02-01 RX ADMIN — PROPOFOL 33.66 MICROGRAM(S)/KG/MIN: 10 INJECTION, EMULSION INTRAVENOUS at 19:52

## 2019-02-01 RX ADMIN — CHLORHEXIDINE GLUCONATE 15 MILLILITER(S): 213 SOLUTION TOPICAL at 17:29

## 2019-02-01 RX ADMIN — AMLODIPINE BESYLATE 10 MILLIGRAM(S): 2.5 TABLET ORAL at 11:57

## 2019-02-01 RX ADMIN — Medication 50 MILLIEQUIVALENT(S): at 08:15

## 2019-02-01 RX ADMIN — Medication 650 MILLIGRAM(S): at 02:27

## 2019-02-01 RX ADMIN — GABAPENTIN 400 MILLIGRAM(S): 400 CAPSULE ORAL at 06:36

## 2019-02-01 RX ADMIN — Medication 100 MILLIGRAM(S): at 11:57

## 2019-02-01 RX ADMIN — Medication 1 APPLICATION(S): at 11:57

## 2019-02-01 RX ADMIN — Medication 5.26 MICROGRAM(S)/KG/MIN: at 03:30

## 2019-02-01 RX ADMIN — PROPOFOL 33.66 MICROGRAM(S)/KG/MIN: 10 INJECTION, EMULSION INTRAVENOUS at 08:24

## 2019-02-01 RX ADMIN — FENTANYL CITRATE 5.61 MICROGRAM(S)/KG/HR: 50 INJECTION INTRAVENOUS at 19:53

## 2019-02-01 RX ADMIN — PANTOPRAZOLE SODIUM 40 MILLIGRAM(S): 20 TABLET, DELAYED RELEASE ORAL at 06:36

## 2019-02-01 RX ADMIN — PIPERACILLIN AND TAZOBACTAM 25 GRAM(S): 4; .5 INJECTION, POWDER, LYOPHILIZED, FOR SOLUTION INTRAVENOUS at 21:35

## 2019-02-01 RX ADMIN — Medication 650 MILLIGRAM(S): at 17:47

## 2019-02-01 RX ADMIN — FENTANYL CITRATE 5.61 MICROGRAM(S)/KG/HR: 50 INJECTION INTRAVENOUS at 03:30

## 2019-02-01 RX ADMIN — Medication 50 MILLIEQUIVALENT(S): at 10:14

## 2019-02-01 RX ADMIN — POLYETHYLENE GLYCOL 3350 17 GRAM(S): 17 POWDER, FOR SOLUTION ORAL at 11:57

## 2019-02-01 RX ADMIN — Medication 40 MILLIGRAM(S): at 06:36

## 2019-02-01 RX ADMIN — PIPERACILLIN AND TAZOBACTAM 25 GRAM(S): 4; .5 INJECTION, POWDER, LYOPHILIZED, FOR SOLUTION INTRAVENOUS at 06:36

## 2019-02-01 RX ADMIN — Medication 10 MILLIGRAM(S): at 06:36

## 2019-02-01 RX ADMIN — Medication 1 APPLICATION(S): at 17:28

## 2019-02-01 RX ADMIN — CHLORHEXIDINE GLUCONATE 1 APPLICATION(S): 213 SOLUTION TOPICAL at 11:38

## 2019-02-01 RX ADMIN — Medication 2: at 06:49

## 2019-02-01 RX ADMIN — Medication 50 MILLIEQUIVALENT(S): at 09:22

## 2019-02-01 RX ADMIN — Medication 3: at 01:32

## 2019-02-01 RX ADMIN — HEPARIN SODIUM 7500 UNIT(S): 5000 INJECTION INTRAVENOUS; SUBCUTANEOUS at 21:35

## 2019-02-01 RX ADMIN — INSULIN GLARGINE 10 UNIT(S): 100 INJECTION, SOLUTION SUBCUTANEOUS at 01:29

## 2019-02-01 RX ADMIN — PROPOFOL 33.66 MICROGRAM(S)/KG/MIN: 10 INJECTION, EMULSION INTRAVENOUS at 03:29

## 2019-02-01 RX ADMIN — SIMVASTATIN 20 MILLIGRAM(S): 20 TABLET, FILM COATED ORAL at 21:35

## 2019-02-01 RX ADMIN — PIPERACILLIN AND TAZOBACTAM 25 GRAM(S): 4; .5 INJECTION, POWDER, LYOPHILIZED, FOR SOLUTION INTRAVENOUS at 14:09

## 2019-02-01 RX ADMIN — Medication 100 MILLIGRAM(S): at 06:36

## 2019-02-01 RX ADMIN — Medication 137 MICROGRAM(S): at 06:36

## 2019-02-01 RX ADMIN — Medication 5.26 MICROGRAM(S)/KG/MIN: at 08:24

## 2019-02-01 RX ADMIN — HEPARIN SODIUM 7500 UNIT(S): 5000 INJECTION INTRAVENOUS; SUBCUTANEOUS at 14:10

## 2019-02-01 RX ADMIN — HEPARIN SODIUM 7500 UNIT(S): 5000 INJECTION INTRAVENOUS; SUBCUTANEOUS at 06:36

## 2019-02-01 RX ADMIN — Medication 5.26 MICROGRAM(S)/KG/MIN: at 19:53

## 2019-02-01 RX ADMIN — Medication 3: at 11:57

## 2019-02-01 RX ADMIN — Medication 5.26 MICROGRAM(S)/KG/MIN: at 06:37

## 2019-02-01 RX ADMIN — Medication 4: at 17:28

## 2019-02-01 RX ADMIN — Medication 650 MILLIGRAM(S): at 01:27

## 2019-02-01 RX ADMIN — PROPOFOL 33.66 MICROGRAM(S)/KG/MIN: 10 INJECTION, EMULSION INTRAVENOUS at 06:37

## 2019-02-01 NOTE — PROGRESS NOTE ADULT - SUBJECTIVE AND OBJECTIVE BOX
AFTAB BASS 63y [id number]    Patient is a 63y old  Female who presents with a chief complaint of SBO, respiratory failure (31 Jan 2019 09:45)      Follow Up/CC:  ID following for bacteremia    Interval History/ROS: in MICU, intubated, fever+, minimal secretions per RN    Allergies    No Known Allergies    Intolerances    Seroquel (Other)      ANTIMICROBIALS:  piperacillin/tazobactam IVPB. 3.375 every 8 hours  vancomycin  IVPB 500 every 24 hours      MEDICATIONS  (STANDING):  amLODIPine   Tablet 10 milliGRAM(s) Oral daily  chlorhexidine 0.12% Liquid 15 milliLiter(s) Swish and Spit every 12 hours  chlorhexidine 4% Liquid 1 Application(s) Topical <User Schedule>  collagenase Ointment 1 Application(s) Topical daily  dextrose 5%. 1000 milliLiter(s) (50 mL/Hr) IV Continuous <Continuous>  dextrose 50% Injectable 12.5 Gram(s) IV Push once  dextrose 50% Injectable 25 Gram(s) IV Push once  dextrose 50% Injectable 25 Gram(s) IV Push once  docusate sodium 100 milliGRAM(s) Oral two times a day  fentaNYL   Infusion. 0.5 MICROgram(s)/kG/Hr (5.61 mL/Hr) IV Continuous <Continuous>  furosemide   Injectable 40 milliGRAM(s) IV Push daily  gabapentin 400 milliGRAM(s) Oral three times a day  heparin  Injectable 7500 Unit(s) SubCutaneous every 8 hours  influenza   Vaccine 0.5 milliLiter(s) IntraMuscular once  insulin glargine Injectable (LANTUS) 10 Unit(s) SubCutaneous at bedtime  insulin lispro (HumaLOG) corrective regimen sliding scale   SubCutaneous every 6 hours  levothyroxine 137 MICROGram(s) Oral daily  metoprolol tartrate 25 milliGRAM(s) Enteral Tube two times a day  norepinephrine Infusion 0.05 MICROgram(s)/kG/Min (5.259 mL/Hr) IV Continuous <Continuous>  pantoprazole    Tablet 40 milliGRAM(s) Oral before breakfast  petrolatum Ophthalmic Ointment 1 Application(s) Both EYES every 12 hours  piperacillin/tazobactam IVPB. 3.375 Gram(s) IV Intermittent every 8 hours  polyethylene glycol 3350 17 Gram(s) Oral daily  predniSONE   Tablet 10 milliGRAM(s) Oral daily  propofol Infusion 50 MICROgram(s)/kG/Min (33.66 mL/Hr) IV Continuous <Continuous>  senna 2 Tablet(s) Oral at bedtime  simvastatin 20 milliGRAM(s) Oral at bedtime  traZODone 50 milliGRAM(s) Oral at bedtime  vancomycin  IVPB 500 milliGRAM(s) IV Intermittent every 24 hours    MEDICATIONS  (PRN):  dextrose 40% Gel 15 Gram(s) Oral once PRN Blood Glucose LESS THAN 70 milliGRAM(s)/deciliter  glucagon  Injectable 1 milliGRAM(s) IntraMuscular once PRN Glucose LESS THAN 70 milligrams/deciliter        Vital Signs Last 24 Hrs  T(C): 37.8 (01 Feb 2019 08:00), Max: 38.2 (01 Feb 2019 00:00)  T(F): 100 (01 Feb 2019 08:00), Max: 100.8 (01 Feb 2019 00:00)  HR: 115 (01 Feb 2019 10:00) (110 - 134)  BP: 105/57 (01 Feb 2019 10:00) (100/57 - 184/98)  BP(mean): 68 (01 Feb 2019 10:00) (62 - 140)  RR: 25 (01 Feb 2019 10:00) (14 - 30)  SpO2: 92% (01 Feb 2019 10:00) (62% - 92%)    CBC Full  -  ( 01 Feb 2019 06:30 )  WBC Count : 12.86 K/uL  Hemoglobin : 9.6 g/dL  Hematocrit : 31.0 %  Platelet Count - Automated : 486 K/uL  Mean Cell Volume : 101.6 fL  Mean Cell Hemoglobin : 31.5 pg  Mean Cell Hemoglobin Concentration : 31.0 %  Auto Neutrophil # : x  Auto Lymphocyte # : x  Auto Monocyte # : x  Auto Eosinophil # : x  Auto Basophil # : x  Auto Neutrophil % : x  Auto Lymphocyte % : x  Auto Monocyte % : x  Auto Eosinophil % : x  Auto Basophil % : x    02-01    138  |  95<L>  |  20  ----------------------------<  241<H>  3.0<L>   |  22  |  1.16    Ca    8.2<L>      01 Feb 2019 06:30  Phos  3.3     02-01  Mg     1.9     02-01    TPro  6.3  /  Alb  2.3<L>  /  TBili  0.2  /  DBili  x   /  AST  < 4<L>  /  ALT  < 4<L>  /  AlkPhos  101  02-01    LIVER FUNCTIONS - ( 01 Feb 2019 06:30 )  Alb: 2.3 g/dL / Pro: 6.3 g/dL / ALK PHOS: 101 u/L / ALT: < 4 u/L / AST: < 4 u/L / GGT: x               MICROBIOLOGY:  SPUTUM  01-30-19 --  --  --      Culture - Wound (01.12.19 @ 21:49)    -  Vancomycin: S 2 RON    -  Tetra/Doxy: R >8 RON    Culture - Wound:   MODERATE    -  Ciprofloxacin: R >2 RON    -  Ampicillin: S <=2 RON    Specimen Source: OTHER    Organism Identification: Enterococcus faecalis  Corynebacterium species    Organism: Enterococcus faecalis  QUANTITY OF GROWTH: FEW    Organism: Corynebacterium species    Method Type: POSITIVE RON 29    Culture - Blood (01.11.19 @ 21:31)    -  Ampicillin: R >8 RON    -  Gentamicin 500: S <=500 RON    Culture - Blood:   ***Blood Panel PCR results on this specimen are available  approximately 3 hours after the Gram stain result***  Gram stain, PCR, and/or culture results may not always  correspond due to difference in methodologies  ------------------------------------------------------------  This PCR assay was performed using [employer].  The  following targets are tested for:  Enterococcus, vancomycin  resistant enterococci, Listeria monocytogenes,  coagulase  negative staphylococci, S. aureus, methicillin resistant S.  aureus, Streptococcus agalactiae (Group B), S. pneumoniae,  S. pyogenes (Group A), Acinetobacter baumannii, Enterobacter  cloacae, E. coli, Klebsiella oxytoca, K. pneumoniae, Proteus  sp., Serratia marcescens, Haemophilus influenzae, Neisseria  meningitidis, Pseudomonas aeruginosa, Candida albicans, C.  glabrata, C. krusei, C. parapsilosis, C. tropicalis and the  KPC resistance gene.  **NOTE: Due to technical problems, Proteus sp. will NOT be  reported as part of the BCID paneluntil further notice.    -  Enterococcus species: + DETECT RON    -  Streptomycin 1000: R >1000 RON    -  Vancomycin: S 1 RON    Specimen Source: BLOOD PERIPHERAL    Organism: BLOOD CULTURE PCR    Organism: Enterococcus faecium    Gram Stain Blood:   ***** CRITICAL RESULT *****  PERSON CALLED / READ-BACK: /YES  DATE / TIME CALLED: 01/12/19 1236  CALLED BY: DIANN RUSHING  GPCPR^Gram Pos Cocci in Pairs  AFTER: 14 HOURS INCUBATION  BOTTLE: AEROBIC BOTTLE  GPCC[facility]^Gram Pos Cocci in Chains  AFTER: 14 HOURS INCUBATION  BOTTLE: AEROBIC BOTTLE    Organism Identification: BLOOD CULTURE PCR  Enterococcus faecium    Method Type: PCR    Method Type: POSITIVE RON 29      BLOOD PERIPHERAL  01-29-19 --  --  --      BLOOD PERIPHERAL  01-29-19 --  --  --      BLOOD VENOUS  01-17-19 --  --  --            BLOOD VENOUS  01-11-19 --  --  --      BRONCHIAL LAVAGE  01-09-19 --  --  --      SPUTUM  01-02-19 --  --  --          RADIOLOGY    < from: Xray Chest 1 View- PORTABLE-Urgent (01.31.19 @ 20:21) >  Follow-up studies post intubation, enteric tube and left   subclavian line placement.    < end of copied text >

## 2019-02-01 NOTE — PROGRESS NOTE ADULT - PROBLEM SELECTOR PLAN 1
Patient to be initiated on enteral feeds.  Can monitor on Lantus 22 u qhs and low Humalog scale q6h as per MICU team.  If patient with hyperglycemia on this regimen can consider change to NPH regimen.  Goal glucose 100-180

## 2019-02-01 NOTE — PROGRESS NOTE ADULT - ASSESSMENT
63 F with PMH of COPD (on home O2 (3L), hypothyroidism, T2DM (HbA1c 8s 11/2018, on insulin), RA on prednisone (10 mg daily x 30 years), HTN,  admitted for SBO s/p ex lap with lysis of adhesions, had been intubated for hypercapnic respiratory failure.

## 2019-02-01 NOTE — PROGRESS NOTE ADULT - PROBLEM SELECTOR PLAN 1
-suspect abd source  -MATEO negative on 1/23  -cont vanco, zosyn  -Ct abd/pelvis when stable  -repeat bcx negative

## 2019-02-01 NOTE — PROGRESS NOTE ADULT - ASSESSMENT
Ms. Morrow is a 63 year old female with PMHx of HTN, DM, hypothyroidism, RA (prednisone 10mg), pulmonary HTN, CHRIS, and COPD (3L at home) who was originally admitted on 12/15 for SBO s/p ex lap with lysis of adhesions (4 retention sutures) c/b 12/30 she eviscerated 2/2 a coughing episode s/p ex lap (6 retention sutures) c/b multifocal pneumonia, developed hypercapnic respiratory failure requiring intubation 1/6, s/p bronch 1/9/18, and extubated on 1/24, course further c/b Enterococci bacteremia now readmitted to MICU for hypoxic respiratory failure 2/2 atelectasis requiring intubation     #Neuro  Sedated   - Continue propofol and fentanyl     #Respiratory   Hypoxic Respiratory Failure 2/2 atelectasis requiring intubation 1/31  Bronchoscopy showing findings concerning for hyperdynamic airway collapse.   Patient was deconditioned and unable to sit up and participate with PT leading to a functional shunt   - At this time, will continue ventilatory support until we have a goals of care discussion today     #Cardiovascular  Cardiogenic shock 2/2 iatrogenic   Prior bedside US showing LVOT   - On levophed, will wean off as tolerated   - Holding amlodipine 10mg, furosemide 40mg, and metoprolol 25mg BID    - Simvastatin 20mg     #GI  SBO s/p ex-lap with findings of healthy bowel c/b evisceration of bowel requiring placement of retention sutures  - NPO for now; holding bowel regimen   - Continue Protonix    #Renal/Metabolic  - Monitor for electrolyte derangements     #ID  Enterococci bacteremia  - Vancomycin D21  Due to fever of unknown origin, antibiotics were broadened for gram negative coverage- Zosyn D4  Repeat infectious work up negative: UA, RVP  - Pending blood cultures- if all work up negative, will dc zosyn tomorrow     #Endocrine  Diabetes Mellitus Type 2  - Lantus 22U bedtime and ISS but now that she is NPO, will switch to lantus 10U daily   - Holding premeal insulin while NPO   - Gabapentin 400 TID held for now   Rheumatoid arthritis   - Continue prednisone 10mg   Hypothyroidism   - Levothyroxine 137     #DVT ppx  - Heparin SQ     #Skin  - PIV access.

## 2019-02-01 NOTE — CHART NOTE - NSCHARTNOTEFT_GEN_A_CORE
Pre-Bronchoscopy Tuberculosis Risk Screening Tool  To reduce the risk for airborne transmission of Mycobacterium tuberculosis, this assessment form must be completed prior to bronchoscopy procedures being performed outside of a negative pressure environment.    Procedure Date: _____1/31/19________________  Health Care Provider Name: _______Ketty Taylor_________________  Form Completed By: _________Ketty Taylor_______________  Reason for the Bronchoscopy: _____Hypoxemic Respiratory Failure_____________________  Date of Procedure: ______1/31/19____________  Planned Location for the Procedure:  [ ] Emergency Department     [x ] Intensive Care Unit     [ ] Operating Room     Other: ___________________    Risk Assessment  I. I have personally evaluated this patient for Mycobacterium tuberculosis and I determined the following risk:  [ x] No Risk for TB     [ ] Low risk or TB     [ ] Significant risk for TB    II. Additional Findings: _________________________    III. Based on the Determined Risk for TB the following Action(s) are Suggested:  1. If there are no risk factors for TB infection proceed with the procedure.  2. If there is low risk or significant risk for TB infection the following recommendations should be followed:            a. Perform the procedure in a negative pressure room, with N95 respirator.            b. If not feasible to move the patient or defer the procedure:                    i. Use a single-bedded room low traffic area to perform the bronchoscopy procedure.                   ii. Place a portable high-efficiency particulate air (HEPA) filter in the space prior to starting the procedure and keep closed.                       Refer to the INF.1132 titled“Tuberculosis Control Strategy Plan” for additional information.                  iii. All Health Care Providers within the procedure room shall wear an N95 respirator.            c. Documentation of the tuberculosis risk assessment should be included within the patient’s medical record.    Indication: Hypoxemic Respiratory Failure with left lung collapse    Operators: Ketty Taylor MD    Pre-op Dx: Respiratory Failure    Preop medications: Please see tracheal intubation note performed immediately prior to bronchoscopy, no additional medications were administered    Xray/CT Findings: Left lung atelectasis    Findings:  Bronchoscope inserted through ETT. ETT noted to be in good position. Airway evaluation revealed no endobronchial lesions. Dynamic airway collapse of trachea and bilateral bronchi noted with almost complete sagittal dynamic closure in airway lumen siz. Bronchoscope then withdrawn from ETT.

## 2019-02-01 NOTE — PROGRESS NOTE ADULT - SUBJECTIVE AND OBJECTIVE BOX
INTERVAL HPI/OVERNIGHT EVENTS:    SUBJECTIVE: Patient seen and examined at bedside.     ROS:  CONSTITUTIONAL: No weakness, fevers or chills  EYES/ENT: No visual changes;  No vertigo or throat pain   NECK: No pain or stiffness  RESPIRATORY: No cough, wheezing, hemoptysis; No shortness of breath  CARDIOVASCULAR: No chest pain or palpitations  GASTROINTESTINAL: No abdominal or epigastric pain. No nausea, vomiting, or hematemesis; No diarrhea or constipation. No melena or hematochezia.  GENITOURINARY: No dysuria, frequency or hematuria  NEUROLOGICAL: No numbness or weakness  SKIN: No itching, rashes    OBJECTIVE:    VITAL SIGNS:  ICU Vital Signs Last 24 Hrs  T(C): 37.8 (01 Feb 2019 08:00), Max: 38.2 (01 Feb 2019 00:00)  T(F): 100 (01 Feb 2019 08:00), Max: 100.8 (01 Feb 2019 00:00)  HR: 115 (01 Feb 2019 09:00) (110 - 134)  BP: 104/56 (01 Feb 2019 09:00) (96/76 - 184/98)  BP(mean): 67 (01 Feb 2019 09:00) (62 - 140)  ABP: --  ABP(mean): --  RR: 25 (01 Feb 2019 09:00) (14 - 30)  SpO2: 92% (01 Feb 2019 09:00) (62% - 92%)    Mode: AC/ CMV (Assist Control/ Continuous Mandatory Ventilation), RR (machine): 25, TV (machine): 400, FiO2: 100, PEEP: 10, MAP: 16, PIP: 34    01-31 @ 07:01 - 02-01 @ 07:00  --------------------------------------------------------  IN: 2516.4 mL / OUT: 1010 mL / NET: 1506.4 mL    02-01 @ 07:01  - 02-01 @ 09:41  --------------------------------------------------------  IN: 356.2 mL / OUT: 300 mL / NET: 56.2 mL      CAPILLARY BLOOD GLUCOSE      POCT Blood Glucose.: 236 mg/dL (01 Feb 2019 06:39)      PHYSICAL EXAM:  GENERAL: NAD, well-developed  HEAD:  Atraumatic, Normocephalic  EYES: EOMI, PERRLA, conjunctiva and sclera clear  NECK: Supple, No JVD  CHEST/LUNG: Clear to auscultation bilaterally; No wheeze  HEART: Regular rate and rhythm; No murmurs, rubs, or gallops  ABDOMEN: Soft, Nontender, Nondistended; Bowel sounds present  EXTREMITIES:  2+ Peripheral Pulses, No clubbing, cyanosis, or edema  PSYCH: AAOx3  NEUROLOGY: non-focal  SKIN: No rashes or lesions    MEDICATIONS:  MEDICATIONS  (STANDING):  amLODIPine   Tablet 10 milliGRAM(s) Oral daily  chlorhexidine 0.12% Liquid 15 milliLiter(s) Swish and Spit every 12 hours  chlorhexidine 4% Liquid 1 Application(s) Topical <User Schedule>  collagenase Ointment 1 Application(s) Topical daily  dextrose 5%. 1000 milliLiter(s) (50 mL/Hr) IV Continuous <Continuous>  dextrose 50% Injectable 12.5 Gram(s) IV Push once  dextrose 50% Injectable 25 Gram(s) IV Push once  dextrose 50% Injectable 25 Gram(s) IV Push once  docusate sodium 100 milliGRAM(s) Oral two times a day  fentaNYL   Infusion. 0.5 MICROgram(s)/kG/Hr (5.61 mL/Hr) IV Continuous <Continuous>  furosemide   Injectable 40 milliGRAM(s) IV Push daily  gabapentin 400 milliGRAM(s) Oral three times a day  heparin  Injectable 7500 Unit(s) SubCutaneous every 8 hours  influenza   Vaccine 0.5 milliLiter(s) IntraMuscular once  insulin glargine Injectable (LANTUS) 10 Unit(s) SubCutaneous at bedtime  insulin lispro (HumaLOG) corrective regimen sliding scale   SubCutaneous every 6 hours  levothyroxine 137 MICROGram(s) Oral daily  metoprolol tartrate 25 milliGRAM(s) Enteral Tube two times a day  norepinephrine Infusion 0.05 MICROgram(s)/kG/Min (5.259 mL/Hr) IV Continuous <Continuous>  pantoprazole    Tablet 40 milliGRAM(s) Oral before breakfast  petrolatum Ophthalmic Ointment 1 Application(s) Both EYES every 12 hours  piperacillin/tazobactam IVPB. 3.375 Gram(s) IV Intermittent every 8 hours  polyethylene glycol 3350 17 Gram(s) Oral daily  potassium chloride  20 mEq/100 mL IVPB 20 milliEquivalent(s) IV Intermittent every 2 hours  predniSONE   Tablet 10 milliGRAM(s) Oral daily  propofol Infusion 50 MICROgram(s)/kG/Min (33.66 mL/Hr) IV Continuous <Continuous>  senna 2 Tablet(s) Oral at bedtime  simvastatin 20 milliGRAM(s) Oral at bedtime  traZODone 50 milliGRAM(s) Oral at bedtime  vancomycin  IVPB 500 milliGRAM(s) IV Intermittent every 24 hours    MEDICATIONS  (PRN):  dextrose 40% Gel 15 Gram(s) Oral once PRN Blood Glucose LESS THAN 70 milliGRAM(s)/deciliter  glucagon  Injectable 1 milliGRAM(s) IntraMuscular once PRN Glucose LESS THAN 70 milligrams/deciliter      ALLERGIES:  Allergies    No Known Allergies    Intolerances    Seroquel (Other)      LABS:                        9.6    12.86 )-----------( 486      ( 01 Feb 2019 06:30 )             31.0     02-01    138  |  95<L>  |  20  ----------------------------<  241<H>  3.0<L>   |  22  |  1.16    Ca    8.2<L>      01 Feb 2019 06:30  Phos  3.3     02-01  Mg     1.9     02-01    TPro  6.3  /  Alb  2.3<L>  /  TBili  0.2  /  DBili  x   /  AST  < 4<L>  /  ALT  < 4<L>  /  AlkPhos  101  02-01          RADIOLOGY & ADDITIONAL TESTS: Reviewed. INTERVAL HPI/OVERNIGHT EVENTS:  Overnight: patient was intubated for hypoxia to 70s. Bronchoscopy was performed, but aborted due to hypoxia   SUBJECTIVE: Patient seen and examined at bedside.     ROS: unable to obtain due to intubation/ sedation    OBJECTIVE:    VITAL SIGNS:  ICU Vital Signs Last 24 Hrs  T(C): 37.8 (01 Feb 2019 08:00), Max: 38.2 (01 Feb 2019 00:00)  T(F): 100 (01 Feb 2019 08:00), Max: 100.8 (01 Feb 2019 00:00)  HR: 115 (01 Feb 2019 09:00) (110 - 134)  BP: 104/56 (01 Feb 2019 09:00) (96/76 - 184/98)  BP(mean): 67 (01 Feb 2019 09:00) (62 - 140)  ABP: --  ABP(mean): --  RR: 25 (01 Feb 2019 09:00) (14 - 30)  SpO2: 92% (01 Feb 2019 09:00) (62% - 92%)    Mode: AC/ CMV (Assist Control/ Continuous Mandatory Ventilation), RR (machine): 25, TV (machine): 400, FiO2: 100, PEEP: 10, MAP: 16, PIP: 34    01-31 @ 07:01 - 02-01 @ 07:00  --------------------------------------------------------  IN: 2516.4 mL / OUT: 1010 mL / NET: 1506.4 mL    02-01 @ 07:01  - 02-01 @ 09:41  --------------------------------------------------------  IN: 356.2 mL / OUT: 300 mL / NET: 56.2 mL      CAPILLARY BLOOD GLUCOSE      POCT Blood Glucose.: 236 mg/dL (01 Feb 2019 06:39)      PHYSICAL EXAM:  GENERAL: NAD, comfortable  CHEST/LUNG: Clear to auscultation bilaterally in anterior regions,   HEART: Regular rate and rhythm; No murmurs, rubs, or gallops  ABDOMEN: Soft, Nondistended; Bowel sounds present; wounds covered by gauze   EXTREMITIES:  2+ Peripheral Pulses; joint deformities from RA  SKIN: No rashes or lesions    MEDICATIONS:  MEDICATIONS  (STANDING):  amLODIPine   Tablet 10 milliGRAM(s) Oral daily  chlorhexidine 0.12% Liquid 15 milliLiter(s) Swish and Spit every 12 hours  chlorhexidine 4% Liquid 1 Application(s) Topical <User Schedule>  collagenase Ointment 1 Application(s) Topical daily  dextrose 5%. 1000 milliLiter(s) (50 mL/Hr) IV Continuous <Continuous>  dextrose 50% Injectable 12.5 Gram(s) IV Push once  dextrose 50% Injectable 25 Gram(s) IV Push once  dextrose 50% Injectable 25 Gram(s) IV Push once  docusate sodium 100 milliGRAM(s) Oral two times a day  fentaNYL   Infusion. 0.5 MICROgram(s)/kG/Hr (5.61 mL/Hr) IV Continuous <Continuous>  furosemide   Injectable 40 milliGRAM(s) IV Push daily  gabapentin 400 milliGRAM(s) Oral three times a day  heparin  Injectable 7500 Unit(s) SubCutaneous every 8 hours  influenza   Vaccine 0.5 milliLiter(s) IntraMuscular once  insulin glargine Injectable (LANTUS) 10 Unit(s) SubCutaneous at bedtime  insulin lispro (HumaLOG) corrective regimen sliding scale   SubCutaneous every 6 hours  levothyroxine 137 MICROGram(s) Oral daily  metoprolol tartrate 25 milliGRAM(s) Enteral Tube two times a day  norepinephrine Infusion 0.05 MICROgram(s)/kG/Min (5.259 mL/Hr) IV Continuous <Continuous>  pantoprazole    Tablet 40 milliGRAM(s) Oral before breakfast  petrolatum Ophthalmic Ointment 1 Application(s) Both EYES every 12 hours  piperacillin/tazobactam IVPB. 3.375 Gram(s) IV Intermittent every 8 hours  polyethylene glycol 3350 17 Gram(s) Oral daily  potassium chloride  20 mEq/100 mL IVPB 20 milliEquivalent(s) IV Intermittent every 2 hours  predniSONE   Tablet 10 milliGRAM(s) Oral daily  propofol Infusion 50 MICROgram(s)/kG/Min (33.66 mL/Hr) IV Continuous <Continuous>  senna 2 Tablet(s) Oral at bedtime  simvastatin 20 milliGRAM(s) Oral at bedtime  traZODone 50 milliGRAM(s) Oral at bedtime  vancomycin  IVPB 500 milliGRAM(s) IV Intermittent every 24 hours    MEDICATIONS  (PRN):  dextrose 40% Gel 15 Gram(s) Oral once PRN Blood Glucose LESS THAN 70 milliGRAM(s)/deciliter  glucagon  Injectable 1 milliGRAM(s) IntraMuscular once PRN Glucose LESS THAN 70 milligrams/deciliter      ALLERGIES:  Allergies    No Known Allergies    Intolerances    Seroquel (Other)      LABS:                        9.6    12.86 )-----------( 486      ( 01 Feb 2019 06:30 )             31.0     02-01    138  |  95<L>  |  20  ----------------------------<  241<H>  3.0<L>   |  22  |  1.16    Ca    8.2<L>      01 Feb 2019 06:30  Phos  3.3     02-01  Mg     1.9     02-01    TPro  6.3  /  Alb  2.3<L>  /  TBili  0.2  /  DBili  x   /  AST  < 4<L>  /  ALT  < 4<L>  /  AlkPhos  101  02-01          RADIOLOGY & ADDITIONAL TESTS: Reviewed.

## 2019-02-01 NOTE — PROGRESS NOTE ADULT - SUBJECTIVE AND OBJECTIVE BOX
INTERVAL HPI/OVERNIGHT EVENTS:    Patient currently intubated for airway support, no distress noted. Patients son and daughter are at bedside.     Code Status: Full code  Allergies    No Known Allergies    Intolerances    Seroquel (Other)  MEDICATIONS  (STANDING):  chlorhexidine 0.12% Liquid 15 milliLiter(s) Swish and Spit every 12 hours  chlorhexidine 4% Liquid 1 Application(s) Topical <User Schedule>  collagenase Ointment 1 Application(s) Topical daily  dextrose 5%. 1000 milliLiter(s) (50 mL/Hr) IV Continuous <Continuous>  dextrose 50% Injectable 12.5 Gram(s) IV Push once  dextrose 50% Injectable 25 Gram(s) IV Push once  dextrose 50% Injectable 25 Gram(s) IV Push once  fentaNYL   Infusion. 0.5 MICROgram(s)/kG/Hr (5.61 mL/Hr) IV Continuous <Continuous>  heparin  Injectable 7500 Unit(s) SubCutaneous every 8 hours  influenza   Vaccine 0.5 milliLiter(s) IntraMuscular once  insulin glargine Injectable (LANTUS) 22 Unit(s) SubCutaneous at bedtime  insulin lispro (HumaLOG) corrective regimen sliding scale   SubCutaneous every 6 hours  levothyroxine 137 MICROGram(s) Oral daily  norepinephrine Infusion 0.05 MICROgram(s)/kG/Min (5.259 mL/Hr) IV Continuous <Continuous>  pantoprazole    Tablet 40 milliGRAM(s) Oral before breakfast  petrolatum Ophthalmic Ointment 1 Application(s) Both EYES every 12 hours  piperacillin/tazobactam IVPB. 3.375 Gram(s) IV Intermittent every 8 hours  predniSONE   Tablet 10 milliGRAM(s) Oral daily  propofol Infusion 50 MICROgram(s)/kG/Min (33.66 mL/Hr) IV Continuous <Continuous>  simvastatin 20 milliGRAM(s) Oral at bedtime  vancomycin  IVPB 500 milliGRAM(s) IV Intermittent every 24 hours    MEDICATIONS  (PRN):  dextrose 40% Gel 15 Gram(s) Oral once PRN Blood Glucose LESS THAN 70 milliGRAM(s)/deciliter  glucagon  Injectable 1 milliGRAM(s) IntraMuscular once PRN Glucose LESS THAN 70 milligrams/deciliter      PRESENT SYMPTOMS: [x ]Unable to obtain due to poor mentation   ROS: Unable to obtain secondary to sedation    PAIN AD Score:	No non verbal pain indicators noted.   http://geriatrictoolkit.[employer].edu/cog/painad.pdf (Ctrl + left click to view)    Karnofsky Performance Score/Palliative Performance Status Version 2:         10%    http://palliative.info/resource_material/[email or web address].pdf    PHYSICAL EXAM:  Vital Signs Last 24 Hrs  T(C): 37.7 (01 Feb 2019 12:00), Max: 38.2 (01 Feb 2019 00:00)  T(F): 99.8 (01 Feb 2019 12:00), Max: 100.8 (01 Feb 2019 00:00)  HR: 116 (01 Feb 2019 15:24) (111 - 134)  BP: 106/50 (01 Feb 2019 15:00) (100/57 - 184/98)  BP(mean): 65 (01 Feb 2019 15:00) (65 - 140)  RR: 23 (01 Feb 2019 15:00) (14 - 30)  SpO2: 94% (01 Feb 2019 15:24) (62% - 94%) I&O's Summary    31 Jan 2019 07:01  -  01 Feb 2019 07:00  --------------------------------------------------------  IN: 2516.4 mL / OUT: 1010 mL / NET: 1506.4 mL    01 Feb 2019 07:01  -  01 Feb 2019 15:41  --------------------------------------------------------  IN: 1097.5 mL / OUT: 1020 mL / NET: 77.5 mL     GENERAL:  [ ]Alert  [ ]Oriented x   [ ]Lethargic  [ ]Cachexia  [ x]Unarousable due to sedation [ ]Verbal  [ ]Non-Verbal  Behavioral:   [ ] Anxiety  [ ] Delirium [ ] Agitation [ ] Other  HEENT:  [ ]Normal   [ ]Dry mouth   [x ]ET Tube/Trach  [ ]Oral lesions  PULMONARY:   [xlear anteriorly [ ]Tachypnea  [ ]Audible excessive secretions   [ ]Rhonchi        [ ]Right [ ]Left [ ]Bilateral  [ ]Crackles        [ ]Right [ ]Left [ ]Bilateral  [ ]Wheezing     [ ]Right [ ]Left [ ]Bilateral  CARDIOVASCULAR:    [ ]Regular [ ]Irregular [x ]Tachy  [ ]Ubaldo [ ]Murmur [ ]Other  GASTROINTESTINAL:  [x ]Soft  [ ]Distended   [ ]+BS  [ x]Non tender [ ]Tender  [ ]PEG [ ]OGT/ NGT   Last BM:   GENITOURINARY:  [ ]Normal [ ] Incontinent   [ ]Oliguria/Anuria   [x ]Antoine  MUSCULOSKELETAL:   [ ]Normal   [ ]Weakness  [x ]Bed/Wheelchair bound [ ]Edema  NEUROLOGIC:   [ ]No focal deficits  [ ] Cognitive impairment  [ ] Dysphagia [ ]Dysarthria [ ] Paresis [ ]Other  [x]sedated  SKIN:   [x ]Normal   [ ]Pressure ulcer(s)  [ ]Rash    CRITICAL CARE:  [ ] Shock Present  [ ]Septic [ ]Cardiogenic [ ]Neurologic [ ]Hypovolemic  [ ]  Vasopressors [ ]  Inotropes   [ x] Respiratory failure present  [ ] Acute  [ ] Chronic [ ] Hypoxic  [ ] Hypercarbic [ ] Other  [ ] Other organ failure     LABS:                        9.6    12.86 )-----------( 486      ( 01 Feb 2019 06:30 )             31.0   02-01    138  |  95<L>  |  20  ----------------------------<  241<H>  3.0<L>   |  22  |  1.16    Ca    8.2<L>      01 Feb 2019 06:30  Phos  3.3     02-01  Mg     1.9     02-01    TPro  6.3  /  Alb  2.3<L>  /  TBili  0.2  /  DBili  x   /  AST  < 4<L>  /  ALT  < 4<L>  /  AlkPhos  101  02-01    RADIOLOGY & ADDITIONAL STUDIES:    Protein Calorie Malnutrition Present: [ ] yes [ ] no  [ ] PPSV2 < or = 30%  [ ] significant weight loss [ ] poor nutritional intake [ ] anasarca [ ] catabolic state Albumin, Serum: 2.3 g/dL (02-01-19 @ 06:30)      REFERRALS:   [ ]Chaplaincy  [ ] Hospice  [ ]Child Life  [ ]Social Work  [ ]Case management [ ]Holistic Therapy   Goals of Care Document:

## 2019-02-01 NOTE — PROGRESS NOTE ADULT - ASSESSMENT
63 f with DM, COPD, RA on chronic steroids, presented initially with SBO, underwent ex lap 12/15 closed with 4 retention sutures, evicerated on 12/30 and closed with 6 more retention sutures, c/b multifocal pneumonia, developed hypercapnic respiratory failure requiring intubation 1/6, s/p bronch 1/9/18, c/b Enterococci bacteremia, extubated and transferred to floor, still on vanco but developed fever and elevated WBC    new fever and respiratory failure ? etiology.   All cultures negative to date.    enterococcal bacteremia likely intra-abdominal source, cleared on vanco

## 2019-02-01 NOTE — PROCEDURE NOTE - PROCEDURE
<<-----Click on this checkbox to enter Procedure Tracheal intubation  02/01/2019    Active  LEILA No

## 2019-02-01 NOTE — PROGRESS NOTE ADULT - PROBLEM SELECTOR PLAN 3
Now on prednisone 10mg daily (home dose)  Consider increasing if hemodynamically unstable and changing to IV hydrocortisone if needed for hypotension.

## 2019-02-01 NOTE — PROGRESS NOTE ADULT - SUBJECTIVE AND OBJECTIVE BOX
Chief Complaint: DM2    History: Intubated. NPO earlier now to be initiated on enteral feeds. Unable to obtain ROS.    MEDICATIONS  (STANDING):  chlorhexidine 0.12% Liquid 15 milliLiter(s) Swish and Spit every 12 hours  chlorhexidine 4% Liquid 1 Application(s) Topical <User Schedule>  collagenase Ointment 1 Application(s) Topical daily  dextrose 5%. 1000 milliLiter(s) (50 mL/Hr) IV Continuous <Continuous>  dextrose 50% Injectable 12.5 Gram(s) IV Push once  dextrose 50% Injectable 25 Gram(s) IV Push once  dextrose 50% Injectable 25 Gram(s) IV Push once  fentaNYL   Infusion. 0.5 MICROgram(s)/kG/Hr (5.61 mL/Hr) IV Continuous <Continuous>  heparin  Injectable 7500 Unit(s) SubCutaneous every 8 hours  influenza   Vaccine 0.5 milliLiter(s) IntraMuscular once  insulin glargine Injectable (LANTUS) 22 Unit(s) SubCutaneous at bedtime  insulin lispro (HumaLOG) corrective regimen sliding scale   SubCutaneous every 6 hours  levothyroxine 137 MICROGram(s) Oral daily  norepinephrine Infusion 0.05 MICROgram(s)/kG/Min (5.259 mL/Hr) IV Continuous <Continuous>  pantoprazole    Tablet 40 milliGRAM(s) Oral before breakfast  petrolatum Ophthalmic Ointment 1 Application(s) Both EYES every 12 hours  piperacillin/tazobactam IVPB. 3.375 Gram(s) IV Intermittent every 8 hours  predniSONE   Tablet 10 milliGRAM(s) Oral daily  propofol Infusion 50 MICROgram(s)/kG/Min (33.66 mL/Hr) IV Continuous <Continuous>  simvastatin 20 milliGRAM(s) Oral at bedtime  vancomycin  IVPB 500 milliGRAM(s) IV Intermittent every 24 hours    MEDICATIONS  (PRN):  dextrose 40% Gel 15 Gram(s) Oral once PRN Blood Glucose LESS THAN 70 milliGRAM(s)/deciliter  glucagon  Injectable 1 milliGRAM(s) IntraMuscular once PRN Glucose LESS THAN 70 milligrams/deciliter      Allergies    No Known Allergies    Intolerances    Seroquel (Other)    Review of Systems:    UNABLE TO OBTAIN    PHYSICAL EXAM:  VITALS: T(C): 38.1 (02-01-19 @ 16:00)  T(F): 100.5 (02-01-19 @ 16:00), Max: 100.8 (02-01-19 @ 00:00)  HR: 118 (02-01-19 @ 16:00) (111 - 134)  BP: 104/59 (02-01-19 @ 16:00) (100/57 - 184/98)  RR:  (14 - 30)  SpO2:  (62% - 94%)  Wt(kg): --  GENERAL: NAD, cushingoid appearance  EYES: No proptosis, no lid lag, anicteric  HEENT:  ETT on vent  RESPIRATORY: on vent  PSYCH: cannot assess      CAPILLARY BLOOD GLUCOSE      POCT Blood Glucose.: 294 mg/dL (01 Feb 2019 11:53)  POCT Blood Glucose.: 236 mg/dL (01 Feb 2019 06:39)  POCT Blood Glucose.: 272 mg/dL (01 Feb 2019 01:31)  POCT Blood Glucose.: 250 mg/dL (31 Jan 2019 23:44)  POCT Blood Glucose.: 244 mg/dL (31 Jan 2019 18:44)      02-01    138  |  95<L>  |  20  ----------------------------<  241<H>  3.0<L>   |  22  |  1.16    EGFR if : 59  EGFR if non : 51    Ca    8.2<L>      02-01  Mg     1.9     02-01  Phos  3.3     02-01    TPro  6.3  /  Alb  2.3<L>  /  TBili  0.2  /  DBili  x   /  AST  < 4<L>  /  ALT  < 4<L>  /  AlkPhos  101  02-01          Thyroid Function Tests:      Hemoglobin A1C, Whole Blood: 7.1 % <H> [4.0 - 5.6] (12-17-18 @ 03:50)  Hemoglobin A1C, Whole Blood: 7.3 % <H> [4.0 - 5.6] (12-16-18 @ 04:17)

## 2019-02-01 NOTE — PROGRESS NOTE ADULT - ATTENDING COMMENTS
Pt seen with NP.  Pt now intubated.  Family meeting from 1pm to 2pm to discuss overall medical condition and goals.  Son and daughter understand pt's medical condition and are going to discuss next steps with extended family over the weekend.  Discussion around trach/peg vs extubation with focus on comfort was discussed in detail.  Will follow up on Monday.  Please call [phone] over the weekend with any questions.

## 2019-02-01 NOTE — PROCEDURE NOTE - ADDITIONAL PROCEDURE DETAILS
Ultrasound of the lungs examination performed by SICU team after morning rounds in order to assess for volume status. No B lines seen in anterior lung field. B lines present in left base.
No major amount of secretions noted, bronchi open, not much mucus. No plugging. No tracheal irritation seen.
20G 10CM long power glide Midline catheter placed under US in Lt Brachial vein
ETT confirmed above corwin by direct visualization via bronchoscopy immediately post-procedure
old arterial catheter exchanged over guide wire, new catheter inserted under US guidance without any complication

## 2019-02-01 NOTE — PROGRESS NOTE ADULT - ATTENDING COMMENTS
Omer Yap  Attending Physician   Division of Infectious Disease  Pager #[phone]  After 5pm/weekend or no response, call #[phone]    Please call the ID service [phone] with questions or concerns over the weekend.

## 2019-02-01 NOTE — PROCEDURE NOTE - NSTRACHPOSTINTU_RESP_A_CORE
Appropriate capnography/Breath sounds equal/Chest excursion noted/Breath sounds bilateral/Chest X-Ray/Positive end tidal Co2 noted

## 2019-02-01 NOTE — PROGRESS NOTE ADULT - ASSESSMENT
63 year old woman with Rheumatoid arthritis, pain, Respiratory failure, SBO, Debility and encounter for palliative care.

## 2019-02-02 LAB
ANION GAP SERPL CALC-SCNC: 13 MMO/L — SIGNIFICANT CHANGE UP (ref 7–14)
ANION GAP SERPL CALC-SCNC: 18 MMO/L — HIGH (ref 7–14)
APPEARANCE UR: CLEAR — SIGNIFICANT CHANGE UP
BACTERIA # UR AUTO: NEGATIVE — SIGNIFICANT CHANGE UP
BASE EXCESS BLDA CALC-SCNC: 0.1 MMOL/L — SIGNIFICANT CHANGE UP
BASOPHILS # BLD AUTO: 0.1 K/UL — SIGNIFICANT CHANGE UP (ref 0–0.2)
BASOPHILS NFR BLD AUTO: 0.9 % — SIGNIFICANT CHANGE UP (ref 0–2)
BILIRUB UR-MCNC: NEGATIVE — SIGNIFICANT CHANGE UP
BLOOD UR QL VISUAL: NEGATIVE — SIGNIFICANT CHANGE UP
BUN SERPL-MCNC: 11 MG/DL — SIGNIFICANT CHANGE UP (ref 7–23)
BUN SERPL-MCNC: 15 MG/DL — SIGNIFICANT CHANGE UP (ref 7–23)
CALCIUM SERPL-MCNC: 7.8 MG/DL — LOW (ref 8.4–10.5)
CALCIUM SERPL-MCNC: 8.1 MG/DL — LOW (ref 8.4–10.5)
CHLORIDE SERPL-SCNC: 100 MMOL/L — SIGNIFICANT CHANGE UP (ref 98–107)
CHLORIDE SERPL-SCNC: 105 MMOL/L — SIGNIFICANT CHANGE UP (ref 98–107)
CO2 SERPL-SCNC: 23 MMOL/L — SIGNIFICANT CHANGE UP (ref 22–31)
CO2 SERPL-SCNC: 24 MMOL/L — SIGNIFICANT CHANGE UP (ref 22–31)
COLOR SPEC: SIGNIFICANT CHANGE UP
CORTIS SERPL-MCNC: 2.8 UG/DL — SIGNIFICANT CHANGE UP (ref 2.7–18.4)
CREAT SERPL-MCNC: 0.84 MG/DL — SIGNIFICANT CHANGE UP (ref 0.5–1.3)
CREAT SERPL-MCNC: 0.96 MG/DL — SIGNIFICANT CHANGE UP (ref 0.5–1.3)
EOSINOPHIL # BLD AUTO: 0.32 K/UL — SIGNIFICANT CHANGE UP (ref 0–0.5)
EOSINOPHIL NFR BLD AUTO: 2.8 % — SIGNIFICANT CHANGE UP (ref 0–6)
GLUCOSE BLDA-MCNC: 351 MG/DL — HIGH (ref 70–99)
GLUCOSE BLDC GLUCOMTR-MCNC: 236 MG/DL — HIGH (ref 70–99)
GLUCOSE BLDC GLUCOMTR-MCNC: 240 MG/DL — HIGH (ref 70–99)
GLUCOSE BLDC GLUCOMTR-MCNC: 242 MG/DL — HIGH (ref 70–99)
GLUCOSE BLDC GLUCOMTR-MCNC: 317 MG/DL — HIGH (ref 70–99)
GLUCOSE BLDC GLUCOMTR-MCNC: 320 MG/DL — HIGH (ref 70–99)
GLUCOSE SERPL-MCNC: 257 MG/DL — HIGH (ref 70–99)
GLUCOSE SERPL-MCNC: 340 MG/DL — HIGH (ref 70–99)
GLUCOSE UR-MCNC: NEGATIVE — SIGNIFICANT CHANGE UP
GRAM STN SPT: SIGNIFICANT CHANGE UP
HCO3 BLDA-SCNC: 24 MMOL/L — SIGNIFICANT CHANGE UP (ref 22–26)
HCT VFR BLD CALC: 28.8 % — LOW (ref 34.5–45)
HCT VFR BLDA CALC: 27.7 % — LOW (ref 34.5–46.5)
HGB BLD-MCNC: 9.1 G/DL — LOW (ref 11.5–15.5)
HGB BLDA-MCNC: 8.9 G/DL — LOW (ref 11.5–15.5)
HYALINE CASTS # UR AUTO: NEGATIVE — SIGNIFICANT CHANGE UP
IMM GRANULOCYTES NFR BLD AUTO: 2.6 % — HIGH (ref 0–1.5)
KETONES UR-MCNC: SIGNIFICANT CHANGE UP
LEUKOCYTE ESTERASE UR-ACNC: SIGNIFICANT CHANGE UP
LYMPHOCYTES # BLD AUTO: 1.71 K/UL — SIGNIFICANT CHANGE UP (ref 1–3.3)
LYMPHOCYTES # BLD AUTO: 14.9 % — SIGNIFICANT CHANGE UP (ref 13–44)
MAGNESIUM SERPL-MCNC: 1.9 MG/DL — SIGNIFICANT CHANGE UP (ref 1.6–2.6)
MAGNESIUM SERPL-MCNC: 1.9 MG/DL — SIGNIFICANT CHANGE UP (ref 1.6–2.6)
MCHC RBC-ENTMCNC: 31.3 PG — SIGNIFICANT CHANGE UP (ref 27–34)
MCHC RBC-ENTMCNC: 31.6 % — LOW (ref 32–36)
MCV RBC AUTO: 99 FL — SIGNIFICANT CHANGE UP (ref 80–100)
MONOCYTES # BLD AUTO: 1.48 K/UL — HIGH (ref 0–0.9)
MONOCYTES NFR BLD AUTO: 12.9 % — SIGNIFICANT CHANGE UP (ref 2–14)
NEUTROPHILS # BLD AUTO: 7.56 K/UL — HIGH (ref 1.8–7.4)
NEUTROPHILS NFR BLD AUTO: 65.9 % — SIGNIFICANT CHANGE UP (ref 43–77)
NITRITE UR-MCNC: NEGATIVE — SIGNIFICANT CHANGE UP
NRBC # FLD: 0.1 K/UL — LOW (ref 25–125)
PCO2 BLDA: 51 MMHG — HIGH (ref 32–48)
PH BLDA: 7.32 PH — LOW (ref 7.35–7.45)
PH UR: 5.5 — SIGNIFICANT CHANGE UP (ref 5–8)
PHOSPHATE SERPL-MCNC: 1.9 MG/DL — LOW (ref 2.5–4.5)
PHOSPHATE SERPL-MCNC: 2.4 MG/DL — LOW (ref 2.5–4.5)
PLATELET # BLD AUTO: 415 K/UL — HIGH (ref 150–400)
PMV BLD: 9.5 FL — SIGNIFICANT CHANGE UP (ref 7–13)
PO2 BLDA: 64 MMHG — LOW (ref 83–108)
POTASSIUM BLDA-SCNC: 4.5 MMOL/L — SIGNIFICANT CHANGE UP (ref 3.4–4.5)
POTASSIUM SERPL-MCNC: 3.4 MMOL/L — LOW (ref 3.5–5.3)
POTASSIUM SERPL-MCNC: 4.9 MMOL/L — SIGNIFICANT CHANGE UP (ref 3.5–5.3)
POTASSIUM SERPL-SCNC: 3.4 MMOL/L — LOW (ref 3.5–5.3)
POTASSIUM SERPL-SCNC: 4.9 MMOL/L — SIGNIFICANT CHANGE UP (ref 3.5–5.3)
PROT UR-MCNC: 20 — SIGNIFICANT CHANGE UP
RBC # BLD: 2.91 M/UL — LOW (ref 3.8–5.2)
RBC # FLD: 16.1 % — HIGH (ref 10.3–14.5)
RBC CASTS # UR COMP ASSIST: SIGNIFICANT CHANGE UP (ref 0–?)
SAO2 % BLDA: 88.9 % — LOW (ref 95–99)
SODIUM BLDA-SCNC: 139 MMOL/L — SIGNIFICANT CHANGE UP (ref 136–146)
SODIUM SERPL-SCNC: 141 MMOL/L — SIGNIFICANT CHANGE UP (ref 135–145)
SODIUM SERPL-SCNC: 142 MMOL/L — SIGNIFICANT CHANGE UP (ref 135–145)
SP GR SPEC: 1.01 — SIGNIFICANT CHANGE UP (ref 1–1.04)
SPECIMEN SOURCE: SIGNIFICANT CHANGE UP
SQUAMOUS # UR AUTO: SIGNIFICANT CHANGE UP
URATE CRY FLD QL MICRO: SIGNIFICANT CHANGE UP (ref 0–0)
UROBILINOGEN FLD QL: NORMAL — SIGNIFICANT CHANGE UP
WBC # BLD: 11.47 K/UL — HIGH (ref 3.8–10.5)
WBC # FLD AUTO: 11.47 K/UL — HIGH (ref 3.8–10.5)
WBC UR QL: HIGH (ref 0–?)
YEAST BUDDING # UR COMP ASSIST: SIGNIFICANT CHANGE UP

## 2019-02-02 PROCEDURE — 99291 CRITICAL CARE FIRST HOUR: CPT

## 2019-02-02 RX ORDER — HUMAN INSULIN 100 [IU]/ML
7 INJECTION, SUSPENSION SUBCUTANEOUS EVERY 6 HOURS
Qty: 0 | Refills: 0 | Status: DISCONTINUED | OUTPATIENT
Start: 2019-02-02 | End: 2019-02-04

## 2019-02-02 RX ORDER — VANCOMYCIN HCL 1 G
500 VIAL (EA) INTRAVENOUS EVERY 12 HOURS
Qty: 0 | Refills: 0 | Status: DISCONTINUED | OUTPATIENT
Start: 2019-02-02 | End: 2019-02-07

## 2019-02-02 RX ORDER — POTASSIUM CHLORIDE 20 MEQ
20 PACKET (EA) ORAL
Qty: 0 | Refills: 0 | Status: COMPLETED | OUTPATIENT
Start: 2019-02-02 | End: 2019-02-02

## 2019-02-02 RX ORDER — VANCOMYCIN HCL 1 G
500 VIAL (EA) INTRAVENOUS ONCE
Qty: 0 | Refills: 0 | Status: COMPLETED | OUTPATIENT
Start: 2019-02-02 | End: 2019-02-02

## 2019-02-02 RX ADMIN — Medication 2: at 07:14

## 2019-02-02 RX ADMIN — Medication 10 MILLIGRAM(S): at 06:26

## 2019-02-02 RX ADMIN — PIPERACILLIN AND TAZOBACTAM 25 GRAM(S): 4; .5 INJECTION, POWDER, LYOPHILIZED, FOR SOLUTION INTRAVENOUS at 22:17

## 2019-02-02 RX ADMIN — Medication 1 APPLICATION(S): at 06:26

## 2019-02-02 RX ADMIN — PIPERACILLIN AND TAZOBACTAM 25 GRAM(S): 4; .5 INJECTION, POWDER, LYOPHILIZED, FOR SOLUTION INTRAVENOUS at 14:58

## 2019-02-02 RX ADMIN — HUMAN INSULIN 7 UNIT(S): 100 INJECTION, SUSPENSION SUBCUTANEOUS at 23:37

## 2019-02-02 RX ADMIN — Medication 137 MICROGRAM(S): at 06:26

## 2019-02-02 RX ADMIN — Medication 2: at 23:36

## 2019-02-02 RX ADMIN — Medication 100 MILLIGRAM(S): at 11:17

## 2019-02-02 RX ADMIN — Medication 4: at 11:17

## 2019-02-02 RX ADMIN — Medication 1 APPLICATION(S): at 17:34

## 2019-02-02 RX ADMIN — Medication 1 APPLICATION(S): at 11:17

## 2019-02-02 RX ADMIN — HEPARIN SODIUM 7500 UNIT(S): 5000 INJECTION INTRAVENOUS; SUBCUTANEOUS at 14:58

## 2019-02-02 RX ADMIN — Medication 50 MILLIEQUIVALENT(S): at 04:30

## 2019-02-02 RX ADMIN — HEPARIN SODIUM 7500 UNIT(S): 5000 INJECTION INTRAVENOUS; SUBCUTANEOUS at 06:26

## 2019-02-02 RX ADMIN — Medication 50 MILLIEQUIVALENT(S): at 06:25

## 2019-02-02 RX ADMIN — PIPERACILLIN AND TAZOBACTAM 25 GRAM(S): 4; .5 INJECTION, POWDER, LYOPHILIZED, FOR SOLUTION INTRAVENOUS at 06:25

## 2019-02-02 RX ADMIN — Medication 100 MILLIGRAM(S): at 00:51

## 2019-02-02 RX ADMIN — Medication 5.26 MICROGRAM(S)/KG/MIN: at 22:17

## 2019-02-02 RX ADMIN — FENTANYL CITRATE 5.61 MICROGRAM(S)/KG/HR: 50 INJECTION INTRAVENOUS at 22:18

## 2019-02-02 RX ADMIN — CHLORHEXIDINE GLUCONATE 15 MILLILITER(S): 213 SOLUTION TOPICAL at 06:25

## 2019-02-02 RX ADMIN — Medication 2: at 00:15

## 2019-02-02 RX ADMIN — Medication 50 MILLIEQUIVALENT(S): at 09:26

## 2019-02-02 RX ADMIN — CHLORHEXIDINE GLUCONATE 15 MILLILITER(S): 213 SOLUTION TOPICAL at 17:33

## 2019-02-02 RX ADMIN — Medication 5.26 MICROGRAM(S)/KG/MIN: at 07:15

## 2019-02-02 RX ADMIN — SIMVASTATIN 20 MILLIGRAM(S): 20 TABLET, FILM COATED ORAL at 22:17

## 2019-02-02 RX ADMIN — PROPOFOL 33.66 MICROGRAM(S)/KG/MIN: 10 INJECTION, EMULSION INTRAVENOUS at 07:14

## 2019-02-02 RX ADMIN — Medication 4: at 17:32

## 2019-02-02 RX ADMIN — CHLORHEXIDINE GLUCONATE 1 APPLICATION(S): 213 SOLUTION TOPICAL at 11:16

## 2019-02-02 RX ADMIN — PROPOFOL 33.66 MICROGRAM(S)/KG/MIN: 10 INJECTION, EMULSION INTRAVENOUS at 22:16

## 2019-02-02 RX ADMIN — FENTANYL CITRATE 5.61 MICROGRAM(S)/KG/HR: 50 INJECTION INTRAVENOUS at 07:15

## 2019-02-02 RX ADMIN — HEPARIN SODIUM 7500 UNIT(S): 5000 INJECTION INTRAVENOUS; SUBCUTANEOUS at 22:16

## 2019-02-02 NOTE — PROGRESS NOTE ADULT - ATTENDING COMMENTS
Recurrent respiratory failure secondary to multiple etiologies, now likely due to HAP and weakness.  Continue MV, adjusting vent setting to provide lung protective ventilation, check ABG. May need prolonged weaning, trach.  Continue abx for HAP and h/o bacteramie.  Continue wound care.

## 2019-02-02 NOTE — PROGRESS NOTE ADULT - SUBJECTIVE AND OBJECTIVE BOX
Chief Complaint: DM2    History: Patient is hyperglycemic in 300s    MEDICATIONS  (STANDING):  chlorhexidine 0.12% Liquid 15 milliLiter(s) Swish and Spit every 12 hours  chlorhexidine 4% Liquid 1 Application(s) Topical <User Schedule>  collagenase Ointment 1 Application(s) Topical daily  dextrose 5%. 1000 milliLiter(s) (50 mL/Hr) IV Continuous <Continuous>  dextrose 50% Injectable 12.5 Gram(s) IV Push once  dextrose 50% Injectable 25 Gram(s) IV Push once  dextrose 50% Injectable 25 Gram(s) IV Push once  fentaNYL   Infusion. 0.5 MICROgram(s)/kG/Hr (5.61 mL/Hr) IV Continuous <Continuous>  heparin  Injectable 7500 Unit(s) SubCutaneous every 8 hours  influenza   Vaccine 0.5 milliLiter(s) IntraMuscular once  insulin glargine Injectable (LANTUS) 22 Unit(s) SubCutaneous at bedtime  insulin lispro (HumaLOG) corrective regimen sliding scale   SubCutaneous every 6 hours  levothyroxine 137 MICROGram(s) Oral daily  norepinephrine Infusion 0.05 MICROgram(s)/kG/Min (5.259 mL/Hr) IV Continuous <Continuous>  petrolatum Ophthalmic Ointment 1 Application(s) Both EYES every 12 hours  piperacillin/tazobactam IVPB. 3.375 Gram(s) IV Intermittent every 8 hours  predniSONE   Tablet 10 milliGRAM(s) Oral daily  propofol Infusion 50 MICROgram(s)/kG/Min (33.66 mL/Hr) IV Continuous <Continuous>  simvastatin 20 milliGRAM(s) Oral at bedtime  vancomycin  IVPB 500 milliGRAM(s) IV Intermittent every 12 hours    MEDICATIONS  (PRN):  dextrose 40% Gel 15 Gram(s) Oral once PRN Blood Glucose LESS THAN 70 milliGRAM(s)/deciliter  glucagon  Injectable 1 milliGRAM(s) IntraMuscular once PRN Glucose LESS THAN 70 milligrams/deciliter      Allergies    No Known Allergies    Intolerances    Seroquel (Other)      ROS: UNABLE TO OBTAIN    PHYSICAL EXAM:  VITALS: T(C): 37.7 (02-02-19 @ 16:00)  T(F): 99.8 (02-02-19 @ 16:00), Max: 100.3 (02-01-19 @ 20:00)  HR: 96 (02-02-19 @ 18:00) (96 - 114)  BP: 112/61 (02-02-19 @ 18:00) (99/57 - 134/67)  RR:  (22 - 25)  SpO2:  (89% - 97%)  Wt(kg): --  GENERAL: NAD, well-groomed, well-developed  EYES: No proptosis, no lid lag, anicteric  HEENT:  Atraumatic, Normocephalic, moist mucous membranes  THYROID: Normal size, no palpable nodules  RESPIRATORY: Clear to auscultation bilaterally; No rales, rhonchi, wheezing, or rubs  CARDIOVASCULAR: Regular rate and rhythm; No murmurs; no peripheral edema  GI: Soft, nontender, non distended, normal bowel sounds  SKIN: Dry, intact, No rashes or lesions  MUSCULOSKELETAL: Full range of motion, normal strength  NEURO: sensation intact, extraocular movements intact, no tremor, normal reflexes  PSYCH: Alert and oriented x 3, normal affect, normal mood  CUSHING'S SIGNS: no striae    POCT Blood Glucose.: 320 mg/dL (02-02-19 @ 17:31)  POCT Blood Glucose.: 317 mg/dL (02-02-19 @ 11:16)  POCT Blood Glucose.: 242 mg/dL (02-02-19 @ 06:16)  POCT Blood Glucose.: 236 mg/dL (02-02-19 @ 00:13)  POCT Blood Glucose.: 233 mg/dL (02-01-19 @ 21:57)  POCT Blood Glucose.: 304 mg/dL (02-01-19 @ 17:28)  POCT Blood Glucose.: 294 mg/dL (02-01-19 @ 11:53)  POCT Blood Glucose.: 236 mg/dL (02-01-19 @ 06:39)  POCT Blood Glucose.: 272 mg/dL (02-01-19 @ 01:31)  POCT Blood Glucose.: 250 mg/dL (01-31-19 @ 23:44)  POCT Blood Glucose.: 244 mg/dL (01-31-19 @ 18:44)  POCT Blood Glucose.: 183 mg/dL (01-31-19 @ 13:03)  POCT Blood Glucose.: 155 mg/dL (01-31-19 @ 06:24)  POCT Blood Glucose.: 99 mg/dL (01-30-19 @ 23:35)      02-02    141  |  105  |  11  ----------------------------<  340<H>  4.9   |  23  |  0.84    EGFR if : 87  EGFR if non : 75    Ca    8.1<L>      02-02  Mg     1.9     02-02  Phos  1.9     02-02    TPro  6.3  /  Alb  2.3<L>  /  TBili  0.2  /  DBili  x   /  AST  < 4<L>  /  ALT  < 4<L>  /  AlkPhos  101  02-01          Thyroid Function Tests:      Hemoglobin A1C, Whole Blood: 7.1 % <H> [4.0 - 5.6] (12-17-18 @ 03:50)  Hemoglobin A1C, Whole Blood: 7.3 % <H> [4.0 - 5.6] (12-16-18 @ 04:17)

## 2019-02-02 NOTE — PROGRESS NOTE ADULT - SUBJECTIVE AND OBJECTIVE BOX
Patient is a 63y old  Female who presents with a chief complaint of SBO, respiratory failure (31 Jan 2019 09:45)      SUBJECTIVE / OVERNIGHT EVENTS:    Patient seen and examined at bedside. No acute events overnight.    Review of systems: No chest pain, no shortness of breath, no abdominal pain, no nausea, no vomiting, no diarrhea, no fevers, and no chills overnight.     MEDICATIONS  (STANDING):  chlorhexidine 0.12% Liquid 15 milliLiter(s) Swish and Spit every 12 hours  chlorhexidine 4% Liquid 1 Application(s) Topical <User Schedule>  collagenase Ointment 1 Application(s) Topical daily  dextrose 5%. 1000 milliLiter(s) (50 mL/Hr) IV Continuous <Continuous>  dextrose 50% Injectable 12.5 Gram(s) IV Push once  dextrose 50% Injectable 25 Gram(s) IV Push once  dextrose 50% Injectable 25 Gram(s) IV Push once  fentaNYL   Infusion. 0.5 MICROgram(s)/kG/Hr (5.61 mL/Hr) IV Continuous <Continuous>  heparin  Injectable 7500 Unit(s) SubCutaneous every 8 hours  influenza   Vaccine 0.5 milliLiter(s) IntraMuscular once  insulin glargine Injectable (LANTUS) 22 Unit(s) SubCutaneous at bedtime  insulin lispro (HumaLOG) corrective regimen sliding scale   SubCutaneous every 6 hours  levothyroxine 137 MICROGram(s) Oral daily  norepinephrine Infusion 0.05 MICROgram(s)/kG/Min (5.259 mL/Hr) IV Continuous <Continuous>  petrolatum Ophthalmic Ointment 1 Application(s) Both EYES every 12 hours  piperacillin/tazobactam IVPB. 3.375 Gram(s) IV Intermittent every 8 hours  potassium chloride  20 mEq/100 mL IVPB 20 milliEquivalent(s) IV Intermittent every 2 hours  predniSONE   Tablet 10 milliGRAM(s) Oral daily  propofol Infusion 50 MICROgram(s)/kG/Min (33.66 mL/Hr) IV Continuous <Continuous>  simvastatin 20 milliGRAM(s) Oral at bedtime  vancomycin  IVPB 500 milliGRAM(s) IV Intermittent every 12 hours    MEDICATIONS  (PRN):  dextrose 40% Gel 15 Gram(s) Oral once PRN Blood Glucose LESS THAN 70 milliGRAM(s)/deciliter  glucagon  Injectable 1 milliGRAM(s) IntraMuscular once PRN Glucose LESS THAN 70 milligrams/deciliter      T(F): 99.1 (02-02 @ 04:00), Max: 100.5 (02-01 @ 16:00)  HR: 99 (02-02 @ 07:26) (99 - 118)  BP: 121/69 (02-02 @ 06:00) (97/53 - 134/67)  RR: 25 (02-02 @ 06:00) (19 - 25)  SpO2: 93% (02-02 @ 07:26) (89% - 94%)  CAPILLARY BLOOD GLUCOSE      POCT Blood Glucose.: 242 mg/dL (02 Feb 2019 06:16)  POCT Blood Glucose.: 236 mg/dL (02 Feb 2019 00:13)  POCT Blood Glucose.: 233 mg/dL (01 Feb 2019 21:57)  POCT Blood Glucose.: 304 mg/dL (01 Feb 2019 17:28)  POCT Blood Glucose.: 294 mg/dL (01 Feb 2019 11:53)    I&O's Summary    01 Feb 2019 07:01  -  02 Feb 2019 07:00  --------------------------------------------------------  IN: 3843.3 mL / OUT: 2335 mL / NET: 1508.3 mL        PHYSICAL EXAM:  GEN: Age appropriate, resting comfortably in bed, no acute distress, non toxic appearing, speaking in complete sentences.   HEENT: Conjunctiva and sclera normal  PULM: Lungs CTAB, no wheezes, rales, rhonchi  CV: RRR, S1S2, no MRG  Abdominal: Soft, nontender to palpation, non-distended, normoactive bowel sounds  Extremities: No edema or cyanosis  NEURO: AAOx3  Skin: No rashes, lesions      LABS:  Labs personally reviewed.                        9.1    11.47 )-----------( 415      ( 02 Feb 2019 02:30 )             28.8     Hgb Trend: 9.1<--, 9.6<--, Test not performed 02/01/19 0641:  HEMOGLOBIN previously reported as: 6.9 *L g/dL<--, 9.4<--, 9.2<--  02-02    142  |  100  |  15  ----------------------------<  257<H>  3.4<L>   |  24  |  0.96    Ca    7.8<L>      02 Feb 2019 02:40  Phos  2.4     02-02  Mg     1.9     02-02    TPro  6.3  /  Alb  2.3<L>  /  TBili  0.2  /  DBili  x   /  AST  < 4<L>  /  ALT  < 4<L>  /  AlkPhos  101  02-01    Creatinine Trend: 0.96<--, 1.16<--, Test not performed 02/01/19 0640:  CREATININE previously reported as: 1.22  mg/dL<--, 1.25<--, 1.23<--, 0.97<--          [facility]  PGY-2 Pager: [phone]  [phone]  Night Float: Patient is a 63y old  Female who presents with a chief complaint of SBO, respiratory failure (31 Jan 2019 09:45)      SUBJECTIVE / OVERNIGHT EVENTS:    Patient seen and examined at bedside. No acute events overnight.    Review of systems: No chest pain, no shortness of breath, no abdominal pain, no nausea, no vomiting, no diarrhea, no fevers, and no chills overnight.     MEDICATIONS  (STANDING):  chlorhexidine 0.12% Liquid 15 milliLiter(s) Swish and Spit every 12 hours  chlorhexidine 4% Liquid 1 Application(s) Topical <User Schedule>  collagenase Ointment 1 Application(s) Topical daily  dextrose 5%. 1000 milliLiter(s) (50 mL/Hr) IV Continuous <Continuous>  dextrose 50% Injectable 12.5 Gram(s) IV Push once  dextrose 50% Injectable 25 Gram(s) IV Push once  dextrose 50% Injectable 25 Gram(s) IV Push once  fentaNYL   Infusion. 0.5 MICROgram(s)/kG/Hr (5.61 mL/Hr) IV Continuous <Continuous>  heparin  Injectable 7500 Unit(s) SubCutaneous every 8 hours  influenza   Vaccine 0.5 milliLiter(s) IntraMuscular once  insulin glargine Injectable (LANTUS) 22 Unit(s) SubCutaneous at bedtime  insulin lispro (HumaLOG) corrective regimen sliding scale   SubCutaneous every 6 hours  levothyroxine 137 MICROGram(s) Oral daily  norepinephrine Infusion 0.05 MICROgram(s)/kG/Min (5.259 mL/Hr) IV Continuous <Continuous>  petrolatum Ophthalmic Ointment 1 Application(s) Both EYES every 12 hours  piperacillin/tazobactam IVPB. 3.375 Gram(s) IV Intermittent every 8 hours  potassium chloride  20 mEq/100 mL IVPB 20 milliEquivalent(s) IV Intermittent every 2 hours  predniSONE   Tablet 10 milliGRAM(s) Oral daily  propofol Infusion 50 MICROgram(s)/kG/Min (33.66 mL/Hr) IV Continuous <Continuous>  simvastatin 20 milliGRAM(s) Oral at bedtime  vancomycin  IVPB 500 milliGRAM(s) IV Intermittent every 12 hours    MEDICATIONS  (PRN):  dextrose 40% Gel 15 Gram(s) Oral once PRN Blood Glucose LESS THAN 70 milliGRAM(s)/deciliter  glucagon  Injectable 1 milliGRAM(s) IntraMuscular once PRN Glucose LESS THAN 70 milligrams/deciliter      T(F): 99.1 (02-02 @ 04:00), Max: 100.5 (02-01 @ 16:00)  HR: 99 (02-02 @ 07:26) (99 - 118)  BP: 121/69 (02-02 @ 06:00) (97/53 - 134/67)  RR: 25 (02-02 @ 06:00) (19 - 25)  SpO2: 93% (02-02 @ 07:26) (89% - 94%)  CAPILLARY BLOOD GLUCOSE      POCT Blood Glucose.: 242 mg/dL (02 Feb 2019 06:16)  POCT Blood Glucose.: 236 mg/dL (02 Feb 2019 00:13)  POCT Blood Glucose.: 233 mg/dL (01 Feb 2019 21:57)  POCT Blood Glucose.: 304 mg/dL (01 Feb 2019 17:28)  POCT Blood Glucose.: 294 mg/dL (01 Feb 2019 11:53)    I&O's Summary    01 Feb 2019 07:01  -  02 Feb 2019 07:00  --------------------------------------------------------  IN: 3843.3 mL / OUT: 2335 mL / NET: 1508.3 mL        PHYSICAL EXAM:  GEN: Age appropriate, resting comfortably in bed, no acute distress, non toxic appearing  HEENT: Conjunctiva and sclera normal  PULM: Lungs bilateral coarse breathsounds  CV: RRR, S1S2, no MRG  Abdominal: abdominal dressing in place  Extremities: No edema or cyanosis  NEURO: unable to assess as intubated and sedated  Skin: No rashes, lesions      LABS:  Labs personally reviewed.                        9.1    11.47 )-----------( 415      ( 02 Feb 2019 02:30 )             28.8     Hgb Trend: 9.1<--, 9.6<--, Test not performed 02/01/19 0641:  HEMOGLOBIN previously reported as: 6.9 *L g/dL<--, 9.4<--, 9.2<--  02-02    142  |  100  |  15  ----------------------------<  257<H>  3.4<L>   |  24  |  0.96    Ca    7.8<L>      02 Feb 2019 02:40  Phos  2.4     02-02  Mg     1.9     02-02    TPro  6.3  /  Alb  2.3<L>  /  TBili  0.2  /  DBili  x   /  AST  < 4<L>  /  ALT  < 4<L>  /  AlkPhos  101  02-01    Creatinine Trend: 0.96<--, 1.16<--, Test not performed 02/01/19 0640:  CREATININE previously reported as: 1.22  mg/dL<--, 1.25<--, 1.23<--, 0.97<--          [facility]  PGY-2 Pager: [phone]  [place]-[phone]  Night Float:

## 2019-02-02 NOTE — PROGRESS NOTE ADULT - PROBLEM SELECTOR PLAN 1
Would switch to NPH 7U q 6 hours plus low dose humalog correctional scale q 6 hours  Goal glucose 100-180

## 2019-02-02 NOTE — PROGRESS NOTE ADULT - ASSESSMENT
NEUROLOGIC:  Alteration of mental status present. Likely due to ____ (structural (bleed or stroke), encephalitis, meningitis, non convulsive status epilepticus, metabolic cause (endogenous vs exogenous)  Eligible for early mobilization and immediate physical therapy?    SKIN:  Lines:  Decubiti:    GI:  Diet:  Bowel regiment:  SBO, underwent ex lap 12/15 closed with 4 retention sutures, evicerated on 12/30 and closed with 6 more retention sutures,    Urinary tract:  UO:   Antoine:       METABOLIC:  BMP showing evidence of   Liver functions tests show   Endocrine abnormalities include  02-02    142  |  100  |  15  ----------------------------<  257<H>  3.4<L>   |  24  |  0.96    Ca    7.8<L>      02 Feb 2019 02:40  Phos  2.4     02-02  Mg     1.9     02-02    TPro  6.3  /  Alb  2.3<L>  /  TBili  0.2  /  DBili  x   /  AST  < 4<L>  /  ALT  < 4<L>  /  AlkPhos  101  02-01      VOLUME ASSESSMENT:  No peripheral edema  No evidence of disturbance of effective circulating volume    HEMATOLOGIC:  CBC results show  Coag panel shows  DVT prophylaxis with                         9.1    11.47 )-----------( 415      ( 02 Feb 2019 02:30 )             28.8         INFECTIOUS DISEASE:  mPNA  Enterococcal bacteremia.  Plan: -suspect abd source  -MATEO negative on 1/23  -cont vanco, zosyn  -Ct abd/pelvis when stable  -repeat bcx negative.     HEMODYNAMICS:  Patient is on pressors due to ____ shock (cardiogenic due to muscle or valve failure, obstructive due to peff, PE, PTX, hypovolemic, vasopegic)     CARDIOVASCULAR:    RESPIRATORY:  Hypoxic Respiratory Failure 2/2 COPD + atelectasis requiring intubation 1/31  Wean as tolerated. GOC. NEUROLOGIC:  Alteration of mental status present. Likely due to exogenous sedation    SKIN:  Lines: R subclavian TLC  Decubiti:    GI:  Diet:  Bowel regiment:  SBO, underwent ex lap 12/15 closed with 4 retention sutures, evicerated on 12/30 and closed with 6 more retention sutures,    Urinary tract:  UO:   Antoine:       METABOLIC:  BMP showing evidence of   Liver functions tests show   Endocrine abnormalities include   hypothryoidism. Continue levothyroxine 137 mcg oral daily as the dose was increased from 125 on this admission due to elevated TSH.   Also hashx of long term (current) use of systemic steroids. Currently on prednisone 10mg daily (home dose)  Will consider changing to IV hydrocortisone if needed for hypotension given on pressors now    02-02    142  |  100  |  15  ----------------------------<  257<H>  3.4<L>   |  24  |  0.96    Ca    7.8<L>      02 Feb 2019 02:40  Phos  2.4     02-02  Mg     1.9     02-02    TPro  6.3  /  Alb  2.3<L>  /  TBili  0.2  /  DBili  x   /  AST  < 4<L>  /  ALT  < 4<L>  /  AlkPhos  101  02-01      VOLUME ASSESSMENT:  No peripheral edema  No evidence of disturbance of effective circulating volume    HEMATOLOGIC:  CBC results show  Coag panel shows  DVT prophylaxis with                         9.1    11.47 )-----------( 415      ( 02 Feb 2019 02:30 )             28.8         INFECTIOUS DISEASE:  mPNA  Enterococcal bacteremia.  Plan: -suspect abd source  -MATEO negative on 1/23  -cont vanco, zosyn  -Ct abd/pelvis when stable  -repeat bcx negative.     HEMODYNAMICS:  Patient is on pressors due to ____ shock (cardiogenic due to muscle or valve failure, obstructive due to peff, PE, PTX, hypovolemic, vasopegic)     CARDIOVASCULAR:    RESPIRATORY:  Hypoxic Respiratory Failure 2/2 COPD + atelectasis requiring intubation 1/31  Wean as tolerated. GOC. NEUROLOGIC:  Alteration of mental status present. Likely due to exogenous sedation    SKIN:  Lines: R subclavian TLC  Decubiti:    GI:  Diet:  Bowel regiment:  SBO, underwent ex lap 12/15 closed with 4 retention sutures, evicerated on 12/30 and closed with 6 more retention sutures,    Urinary tract:  UO:   Antoine:       METABOLIC:  BMP showing evidence of   Liver functions tests show   Endocrine abnormalities include   hypothryoidism. Continue levothyroxine 137 mcg oral daily as the dose was increased from 125 on this admission due to elevated TSH.   Also hashx of long term (current) use of systemic steroids. Currently on prednisone 10mg daily (home dose)  Will consider changing to IV hydrocortisone if needed for hypotension given on pressors now    02-02    142  |  100  |  15  ----------------------------<  257<H>  3.4<L>   |  24  |  0.96    Ca    7.8<L>      02 Feb 2019 02:40  Phos  2.4     02-02  Mg     1.9     02-02    TPro  6.3  /  Alb  2.3<L>  /  TBili  0.2  /  DBili  x   /  AST  < 4<L>  /  ALT  < 4<L>  /  AlkPhos  101  02-01      VOLUME ASSESSMENT:  No peripheral edema  No evidence of disturbance of effective circulating volume    HEMATOLOGIC:  CBC results show  Coag panel shows  DVT prophylaxis with                         9.1    11.47 )-----------( 415      ( 02 Feb 2019 02:30 )             28.8         INFECTIOUS DISEASE:  mPNA  Enterococcal bacteremia.  Plan: -suspect abd source  -MATEO negative on 1/23  -cont vanco, zosyn  -Ct abd/pelvis when stable  -repeat bcx negative.     HEMODYNAMICS:  Patient is on pressors due to ____ shock (cardiogenic due to muscle or valve failure, obstructive due to peff, PE, PTX, hypovolemic, vasopegic)     CARDIOVASCULAR:  - Holding amlodipine 10mg, furosemide 40mg, and metoprolol 25mg BID    - Simvastatin 20mg       RESPIRATORY:  Reintubated for acute on chronic hypoxic Respiratory Failure 2/2 + atelectasis requiring intubation 1/31. Bronch on 2/1 showed: "dynamic airway collapse of trachea and bilateral bronchi noted with almost complete sagittal dynamic closure in airway lumen size"    Wean as tolerated. GOC. NEUROLOGIC:  Alteration of mental status present. Likely due to exogenous sedation    SKIN:  Lines: R subclavian TLC  Decubiti:    GI:  Diet: tube feeds  Bowel regiment: not indicated  SBO, underwent ex lap 12/15 closed with 4 retention sutures, evicerated on 12/30 and closed with 6 more retention sutures,    Urinary tract:  UO:   Antoine:       METABOLIC:  BMP showing evidence of   Liver functions tests show   Endocrine abnormalities include T2DM. Glargine 22 QHS ISS q6 while on tube feeds.   hypothryoidism. Continue levothyroxine 137 mcg oral daily as the dose was increased from 125 on this admission due to elevated TSH.   Hx of long term (current) use of systemic steroids. Currently on prednisone 10mg daily (home dose)  Will consider changing to IV hydrocortisone if needed for hypotension given on pressors now    02-02    142  |  100  |  15  ----------------------------<  257<H>  3.4<L>   |  24  |  0.96    Ca    7.8<L>      02 Feb 2019 02:40  Phos  2.4     02-02  Mg     1.9     02-02    TPro  6.3  /  Alb  2.3<L>  /  TBili  0.2  /  DBili  x   /  AST  < 4<L>  /  ALT  < 4<L>  /  AlkPhos  101  02-01      VOLUME ASSESSMENT:  No peripheral edema  No evidence of disturbance of effective circulating volume    HEMATOLOGIC:  CBC results show  Coag panel shows  DVT prophylaxis with                         9.1    11.47 )-----------( 415      ( 02 Feb 2019 02:30 )             28.8         INFECTIOUS DISEASE:  mPNA  Enterococcal bacteremia.  Plan: -suspect abd source  -MATEO negative on 1/23  -cont vanco, zosyn  -Ct abd/pelvis when stable  -repeat bcx negative.     HEMODYNAMICS:  Patient is on pressors due to ____ shock (cardiogenic due to muscle or valve failure, obstructive due to peff, PE, PTX, hypovolemic, vasopegic)     CARDIOVASCULAR:  - Holding amlodipine 10mg, furosemide 40mg, and metoprolol 25mg BID    - Simvastatin 20mg       RESPIRATORY:  Reintubated for acute on chronic hypoxic Respiratory Failure 2/2 + atelectasis requiring intubation 1/31. Bronch on 2/1 showed: "dynamic airway collapse of trachea and bilateral bronchi noted with almost complete sagittal dynamic closure in airway lumen size"    Wean as tolerated. GOC. NEUROLOGIC:  Alteration of mental status present. Likely due to exogenous sedation    SKIN:  Lines: R subclavian TLC  Decubiti:    GI:  Diet: tube feeds  Bowel regiment: not indicated  SBO, underwent ex lap 12/15 closed with 4 retention sutures, evicerated on 12/30 and closed with 6 more retention sutures  - Recommend CT chest, abdomen and pelvis with IV contrast once patient is stable for transport   - Continue BID dressing changes to midline wound.     Urinary tract:  UO:   Antoine:       METABOLIC:  BMP showing evidence of   Liver functions tests show   Endocrine abnormalities include T2DM. Glargine 22 QHS ISS q6 while on tube feeds.   hypothryoidism. Continue levothyroxine 137 mcg oral daily as the dose was increased from 125 on this admission due to elevated TSH.   Hx of long term (current) use of systemic steroids for RA. Currently on prednisone 10mg daily (home dose)  Will consider changing to IV hydrocortisone if needed for hypotension given on pressors now    02-02    142  |  100  |  15  ----------------------------<  257<H>  3.4<L>   |  24  |  0.96    Ca    7.8<L>      02 Feb 2019 02:40  Phos  2.4     02-02  Mg     1.9     02-02    TPro  6.3  /  Alb  2.3<L>  /  TBili  0.2  /  DBili  x   /  AST  < 4<L>  /  ALT  < 4<L>  /  AlkPhos  101  02-01      VOLUME ASSESSMENT:  No peripheral edema  No evidence of disturbance of effective circulating volume    HEMATOLOGIC:  CBC results show  Coag panel shows  DVT prophylaxis with                         9.1    11.47 )-----------( 415      ( 02 Feb 2019 02:30 )             28.8         INFECTIOUS DISEASE:  mPNA  Enterococcal bacteremia.  Plan: -suspect abd source  -MATEO negative on 1/23  -cont vanco, zosyn  -Ct abd/pelvis when stable  -repeat bcx negative.     HEMODYNAMICS:  Patient is on pressors due to ____ shock (cardiogenic due to muscle or valve failure, obstructive due to peff, PE, PTX, hypovolemic, vasopegic)     CARDIOVASCULAR:  - Holding amlodipine 10mg, furosemide 40mg, and metoprolol 25mg BID    - Simvastatin 20mg       RESPIRATORY:  Reintubated for acute on chronic hypoxic Respiratory Failure 2/2 + atelectasis requiring intubation 1/31. Bronch on 2/1 showed: "dynamic airway collapse of trachea and bilateral bronchi noted with almost complete sagittal dynamic closure in airway lumen size"    Wean as tolerated. GOC. NEUROLOGIC:  Alteration of mental status present. Likely due to exogenous sedation    SKIN:  Lines: R subclavian TLC  Decubiti:    GI:  Diet: tube feeds  Bowel regiment: not indicated  SBO, underwent ex lap 12/15 closed with 4 retention sutures, evicerated on 12/30 and closed with 6 more retention sutures.  Surgery recommending CT chest, abdomen and pelvis with IV contrast once patient is stable for transport   Continue BID dressing changes to midline wound.   Likely source of enterococcus bacteremia    Urinary tract:  UO:   Antoine:       METABOLIC:  BMP showing evidence of   Liver functions tests show   Endocrine abnormalities include T2DM. Glargine 22 QHS ISS q6 while on tube feeds.   hypothryoidism. Continue levothyroxine 137 mcg oral daily as the dose was increased from 125 on this admission due to elevated TSH.   Hx of long term (current) use of systemic steroids for RA. Currently on prednisone 10mg daily (home dose)  Will consider changing to IV hydrocortisone if needed for hypotension given on pressors now    02-02    142  |  100  |  15  ----------------------------<  257<H>  3.4<L>   |  24  |  0.96    Ca    7.8<L>      02 Feb 2019 02:40  Phos  2.4     02-02  Mg     1.9     02-02    TPro  6.3  /  Alb  2.3<L>  /  TBili  0.2  /  DBili  x   /  AST  < 4<L>  /  ALT  < 4<L>  /  AlkPhos  101  02-01      VOLUME ASSESSMENT:  No peripheral edema  No evidence of disturbance of effective circulating volume    HEMATOLOGIC:  CBC results show  Coag panel shows  DVT prophylaxis with                         9.1    11.47 )-----------( 415      ( 02 Feb 2019 02:30 )             28.8         INFECTIOUS DISEASE:  Enterococcal bacteremia likely 2/2 to abd surgical wound infection  -MATEO negative on 1/23  -cont vanco, pip tazo per ID  -Ct abd/pelvis when stable to eval surgical site  -repeat bcx negative 1/29. Clarify w ID for duration of abx for enterococcus    ETT culture from 1/30 growing yeast    HEMODYNAMICS:  Patient is on pressors due to vasoplegic shock 2/2 to sepsis however may be adrenal component. Will consider stress dose steroids.     CARDIOVASCULAR:  - Holding amlodipine 10mg, furosemide 40mg, and metoprolol 25mg BID    - Simvastatin 20mg       RESPIRATORY:  Reintubated for acute on chronic hypoxic Respiratory Failure 2/2 + atelectasis requiring intubation 1/31. Bronch on 2/1 showed: "dynamic airway collapse of trachea and bilateral bronchi noted with almost complete sagittal dynamic closure in airway lumen size"    Wean as tolerated. GOC. NEUROLOGIC:  Alteration of mental status present. Likely due to exogenous sedation    SKIN:  Lines: R subclavian TLC  Decubiti:    GI:  Diet: tube feeds  Bowel regiment: not indicated  SBO, underwent ex lap 12/15 closed with 4 retention sutures, evicerated on 12/30 and closed with 6 more retention sutures.  Surgery recommending CT chest, abdomen and pelvis with IV contrast once patient is stable for transport   Continue BID dressing changes to midline wound.   Likely source of enterococcus bacteremia    Urinary tract:  UO: net + 1.5L  Antoine: will consider DCing      METABOLIC:  BMP showing evidence of   Liver functions tests show   Endocrine abnormalities include T2DM. Glargine 22 QHS ISS q6 while on tube feeds.   hypothryoidism. Continue levothyroxine 137 mcg oral daily as the dose was increased from 125 on this admission due to elevated TSH.   Hx of long term (current) use of systemic steroids for RA. Currently on prednisone 10mg daily (home dose)  Will consider changing to IV hydrocortisone if needed for hypotension given on pressors now    02-02    142  |  100  |  15  ----------------------------<  257<H>  3.4<L>   |  24  |  0.96    Ca    7.8<L>      02 Feb 2019 02:40  Phos  2.4     02-02  Mg     1.9     02-02    TPro  6.3  /  Alb  2.3<L>  /  TBili  0.2  /  DBili  x   /  AST  < 4<L>  /  ALT  < 4<L>  /  AlkPhos  101  02-01      VOLUME ASSESSMENT:  No peripheral edema  No evidence of disturbance of effective circulating volume    HEMATOLOGIC:  CBC results show  Coag panel shows  DVT prophylaxis with                         9.1    11.47 )-----------( 415      ( 02 Feb 2019 02:30 )             28.8         INFECTIOUS DISEASE:  Enterococcal bacteremia likely 2/2 to abd surgical wound infection  -MATEO negative on 1/23  -cont vanco, pip tazo per ID  -Ct abd/pelvis when stable to eval surgical site  -repeat bcx negative 1/29. Clarify w ID for duration of abx for enterococcus    ETT culture from 1/30 growing yeast    HEMODYNAMICS:  Patient is on pressors due to vasoplegic shock 2/2 to sepsis however may be adrenal component. Will consider stress dose steroids.     CARDIOVASCULAR:  - Holding amlodipine 10mg, furosemide 40mg, and metoprolol 25mg BID    - Simvastatin 20mg       RESPIRATORY:  Reintubated for acute on chronic hypoxic Respiratory Failure 2/2 + atelectasis requiring intubation 1/31. Bronch on 2/1 showed: "dynamic airway collapse of trachea and bilateral bronchi noted with almost complete sagittal dynamic closure in airway lumen size"    Wean as tolerated. GOC.

## 2019-02-03 LAB
ALBUMIN SERPL ELPH-MCNC: 2.4 G/DL — LOW (ref 3.3–5)
ALP SERPL-CCNC: 124 U/L — HIGH (ref 40–120)
ALT FLD-CCNC: 14 U/L — SIGNIFICANT CHANGE UP (ref 4–33)
ANION GAP SERPL CALC-SCNC: 14 MMO/L — SIGNIFICANT CHANGE UP (ref 7–14)
AST SERPL-CCNC: 40 U/L — HIGH (ref 4–32)
BACTERIA BLD CULT: SIGNIFICANT CHANGE UP
BACTERIA BLD CULT: SIGNIFICANT CHANGE UP
BASE EXCESS BLDA CALC-SCNC: -1.9 MMOL/L — SIGNIFICANT CHANGE UP
BASE EXCESS BLDA CALC-SCNC: -2.6 MMOL/L — SIGNIFICANT CHANGE UP
BASOPHILS # BLD AUTO: 0.09 K/UL — SIGNIFICANT CHANGE UP (ref 0–0.2)
BASOPHILS NFR BLD AUTO: 0.8 % — SIGNIFICANT CHANGE UP (ref 0–2)
BILIRUB SERPL-MCNC: 0.2 MG/DL — SIGNIFICANT CHANGE UP (ref 0.2–1.2)
BUN SERPL-MCNC: 9 MG/DL — SIGNIFICANT CHANGE UP (ref 7–23)
C DIFF TOX GENS STL QL NAA+PROBE: SIGNIFICANT CHANGE UP
CALCIUM SERPL-MCNC: 7.7 MG/DL — LOW (ref 8.4–10.5)
CHLORIDE BLDA-SCNC: 113 MMOL/L — HIGH (ref 96–108)
CHLORIDE SERPL-SCNC: 106 MMOL/L — SIGNIFICANT CHANGE UP (ref 98–107)
CO2 SERPL-SCNC: 22 MMOL/L — SIGNIFICANT CHANGE UP (ref 22–31)
CREAT SERPL-MCNC: 0.7 MG/DL — SIGNIFICANT CHANGE UP (ref 0.5–1.3)
EOSINOPHIL # BLD AUTO: 0.54 K/UL — HIGH (ref 0–0.5)
EOSINOPHIL NFR BLD AUTO: 4.7 % — SIGNIFICANT CHANGE UP (ref 0–6)
GLUCOSE BLDA-MCNC: 274 MG/DL — HIGH (ref 70–99)
GLUCOSE BLDA-MCNC: 294 MG/DL — HIGH (ref 70–99)
GLUCOSE BLDC GLUCOMTR-MCNC: 217 MG/DL — HIGH (ref 70–99)
GLUCOSE BLDC GLUCOMTR-MCNC: 261 MG/DL — HIGH (ref 70–99)
GLUCOSE BLDC GLUCOMTR-MCNC: 283 MG/DL — HIGH (ref 70–99)
GLUCOSE BLDC GLUCOMTR-MCNC: 292 MG/DL — HIGH (ref 70–99)
GLUCOSE SERPL-MCNC: 280 MG/DL — HIGH (ref 70–99)
HCO3 BLDA-SCNC: 22 MMOL/L — SIGNIFICANT CHANGE UP (ref 22–26)
HCO3 BLDA-SCNC: 22 MMOL/L — SIGNIFICANT CHANGE UP (ref 22–26)
HCT VFR BLD CALC: 30.5 % — LOW (ref 34.5–45)
HCT VFR BLDA CALC: 26.5 % — LOW (ref 34.5–46.5)
HCT VFR BLDA CALC: 28.2 % — LOW (ref 34.5–46.5)
HGB BLD-MCNC: 8.8 G/DL — LOW (ref 11.5–15.5)
HGB BLDA-MCNC: 8.5 G/DL — LOW (ref 11.5–15.5)
HGB BLDA-MCNC: 9.1 G/DL — LOW (ref 11.5–15.5)
IMM GRANULOCYTES NFR BLD AUTO: 2.4 % — HIGH (ref 0–1.5)
LACTATE BLDA-SCNC: 3.5 MMOL/L — HIGH (ref 0.5–2)
LYMPHOCYTES # BLD AUTO: 1.89 K/UL — SIGNIFICANT CHANGE UP (ref 1–3.3)
LYMPHOCYTES # BLD AUTO: 16.4 % — SIGNIFICANT CHANGE UP (ref 13–44)
MAGNESIUM SERPL-MCNC: 2.1 MG/DL — SIGNIFICANT CHANGE UP (ref 1.6–2.6)
MCHC RBC-ENTMCNC: 28.9 % — LOW (ref 32–36)
MCHC RBC-ENTMCNC: 29.7 PG — SIGNIFICANT CHANGE UP (ref 27–34)
MCV RBC AUTO: 103 FL — HIGH (ref 80–100)
MONOCYTES # BLD AUTO: 1.2 K/UL — HIGH (ref 0–0.9)
MONOCYTES NFR BLD AUTO: 10.4 % — SIGNIFICANT CHANGE UP (ref 2–14)
NEUTROPHILS # BLD AUTO: 7.5 K/UL — HIGH (ref 1.8–7.4)
NEUTROPHILS NFR BLD AUTO: 65.3 % — SIGNIFICANT CHANGE UP (ref 43–77)
NRBC # FLD: 0.13 K/UL — LOW (ref 25–125)
NRBC FLD-RTO: 1.1 — SIGNIFICANT CHANGE UP
PCO2 BLDA: 52 MMHG — HIGH (ref 32–48)
PCO2 BLDA: 56 MMHG — HIGH (ref 32–48)
PH BLDA: 7.26 PH — LOW (ref 7.35–7.45)
PH BLDA: 7.27 PH — LOW (ref 7.35–7.45)
PHOSPHATE SERPL-MCNC: 1.9 MG/DL — LOW (ref 2.5–4.5)
PLATELET # BLD AUTO: 424 K/UL — HIGH (ref 150–400)
PMV BLD: 9.6 FL — SIGNIFICANT CHANGE UP (ref 7–13)
PO2 BLDA: 62 MMHG — LOW (ref 83–108)
PO2 BLDA: 78 MMHG — LOW (ref 83–108)
POTASSIUM BLDA-SCNC: 3.5 MMOL/L — SIGNIFICANT CHANGE UP (ref 3.4–4.5)
POTASSIUM BLDA-SCNC: 3.8 MMOL/L — SIGNIFICANT CHANGE UP (ref 3.4–4.5)
POTASSIUM SERPL-MCNC: 4.6 MMOL/L — SIGNIFICANT CHANGE UP (ref 3.5–5.3)
POTASSIUM SERPL-SCNC: 4.6 MMOL/L — SIGNIFICANT CHANGE UP (ref 3.5–5.3)
PROT SERPL-MCNC: 6.2 G/DL — SIGNIFICANT CHANGE UP (ref 6–8.3)
RBC # BLD: 2.96 M/UL — LOW (ref 3.8–5.2)
RBC # FLD: 17.2 % — HIGH (ref 10.3–14.5)
SAO2 % BLDA: 88.1 % — LOW (ref 95–99)
SAO2 % BLDA: 94.5 % — LOW (ref 95–99)
SODIUM BLDA-SCNC: 141 MMOL/L — SIGNIFICANT CHANGE UP (ref 136–146)
SODIUM BLDA-SCNC: 143 MMOL/L — SIGNIFICANT CHANGE UP (ref 136–146)
SODIUM SERPL-SCNC: 142 MMOL/L — SIGNIFICANT CHANGE UP (ref 135–145)
SPECIMEN SOURCE: SIGNIFICANT CHANGE UP
SPECIMEN SOURCE: SIGNIFICANT CHANGE UP
WBC # BLD: 11.5 K/UL — HIGH (ref 3.8–10.5)
WBC # FLD AUTO: 11.5 K/UL — HIGH (ref 3.8–10.5)

## 2019-02-03 PROCEDURE — 71045 X-RAY EXAM CHEST 1 VIEW: CPT | Mod: 26

## 2019-02-03 PROCEDURE — 99232 SBSQ HOSP IP/OBS MODERATE 35: CPT

## 2019-02-03 PROCEDURE — 99291 CRITICAL CARE FIRST HOUR: CPT

## 2019-02-03 RX ORDER — POTASSIUM PHOSPHATE, MONOBASIC POTASSIUM PHOSPHATE, DIBASIC 236; 224 MG/ML; MG/ML
15 INJECTION, SOLUTION INTRAVENOUS ONCE
Qty: 0 | Refills: 0 | Status: COMPLETED | OUTPATIENT
Start: 2019-02-03 | End: 2019-02-03

## 2019-02-03 RX ORDER — FUROSEMIDE 40 MG
40 TABLET ORAL ONCE
Qty: 0 | Refills: 0 | Status: COMPLETED | OUTPATIENT
Start: 2019-02-03 | End: 2019-02-03

## 2019-02-03 RX ADMIN — Medication 1 APPLICATION(S): at 12:54

## 2019-02-03 RX ADMIN — HUMAN INSULIN 7 UNIT(S): 100 INJECTION, SUSPENSION SUBCUTANEOUS at 13:03

## 2019-02-03 RX ADMIN — Medication 3: at 13:07

## 2019-02-03 RX ADMIN — FENTANYL CITRATE 5.61 MICROGRAM(S)/KG/HR: 50 INJECTION INTRAVENOUS at 21:39

## 2019-02-03 RX ADMIN — PROPOFOL 33.66 MICROGRAM(S)/KG/MIN: 10 INJECTION, EMULSION INTRAVENOUS at 07:31

## 2019-02-03 RX ADMIN — HEPARIN SODIUM 7500 UNIT(S): 5000 INJECTION INTRAVENOUS; SUBCUTANEOUS at 05:38

## 2019-02-03 RX ADMIN — HEPARIN SODIUM 7500 UNIT(S): 5000 INJECTION INTRAVENOUS; SUBCUTANEOUS at 13:49

## 2019-02-03 RX ADMIN — Medication 137 MICROGRAM(S): at 05:39

## 2019-02-03 RX ADMIN — PIPERACILLIN AND TAZOBACTAM 25 GRAM(S): 4; .5 INJECTION, POWDER, LYOPHILIZED, FOR SOLUTION INTRAVENOUS at 13:50

## 2019-02-03 RX ADMIN — Medication 3: at 18:06

## 2019-02-03 RX ADMIN — CHLORHEXIDINE GLUCONATE 15 MILLILITER(S): 213 SOLUTION TOPICAL at 05:39

## 2019-02-03 RX ADMIN — CHLORHEXIDINE GLUCONATE 1 APPLICATION(S): 213 SOLUTION TOPICAL at 10:42

## 2019-02-03 RX ADMIN — PROPOFOL 33.66 MICROGRAM(S)/KG/MIN: 10 INJECTION, EMULSION INTRAVENOUS at 21:38

## 2019-02-03 RX ADMIN — HEPARIN SODIUM 7500 UNIT(S): 5000 INJECTION INTRAVENOUS; SUBCUTANEOUS at 21:37

## 2019-02-03 RX ADMIN — CHLORHEXIDINE GLUCONATE 15 MILLILITER(S): 213 SOLUTION TOPICAL at 17:57

## 2019-02-03 RX ADMIN — Medication 3: at 05:40

## 2019-02-03 RX ADMIN — Medication 1 APPLICATION(S): at 17:56

## 2019-02-03 RX ADMIN — PIPERACILLIN AND TAZOBACTAM 25 GRAM(S): 4; .5 INJECTION, POWDER, LYOPHILIZED, FOR SOLUTION INTRAVENOUS at 21:38

## 2019-02-03 RX ADMIN — PIPERACILLIN AND TAZOBACTAM 25 GRAM(S): 4; .5 INJECTION, POWDER, LYOPHILIZED, FOR SOLUTION INTRAVENOUS at 05:39

## 2019-02-03 RX ADMIN — Medication 5.26 MICROGRAM(S)/KG/MIN: at 07:32

## 2019-02-03 RX ADMIN — HUMAN INSULIN 7 UNIT(S): 100 INJECTION, SUSPENSION SUBCUTANEOUS at 18:06

## 2019-02-03 RX ADMIN — Medication 5.26 MICROGRAM(S)/KG/MIN: at 21:38

## 2019-02-03 RX ADMIN — Medication 40 MILLIGRAM(S): at 10:40

## 2019-02-03 RX ADMIN — Medication 10 MILLIGRAM(S): at 06:51

## 2019-02-03 RX ADMIN — Medication 100 MILLIGRAM(S): at 00:20

## 2019-02-03 RX ADMIN — Medication 1 APPLICATION(S): at 05:39

## 2019-02-03 RX ADMIN — Medication 100 MILLIGRAM(S): at 12:54

## 2019-02-03 RX ADMIN — FENTANYL CITRATE 5.61 MICROGRAM(S)/KG/HR: 50 INJECTION INTRAVENOUS at 07:32

## 2019-02-03 RX ADMIN — HUMAN INSULIN 7 UNIT(S): 100 INJECTION, SUSPENSION SUBCUTANEOUS at 05:40

## 2019-02-03 RX ADMIN — POTASSIUM PHOSPHATE, MONOBASIC POTASSIUM PHOSPHATE, DIBASIC 62.5 MILLIMOLE(S): 236; 224 INJECTION, SOLUTION INTRAVENOUS at 04:33

## 2019-02-03 NOTE — PROGRESS NOTE ADULT - ASSESSMENT
· Assessment		  63 f with DM, COPD, RA on chronic steroids, presented initially with SBO, underwent ex lap 12/15 closed with 4 retention sutures, evicerated on 12/30 and closed with 6 more retention sutures, c/b multifocal pneumonia, developed hypercapnic respiratory failure requiring intubation 1/6, s/p bronch 1/9/18, c/b Enterococci bacteremia, extubated and transferred to floor, still on vanco but developed fever and elevated WBC    new fever and respiratory failure ? etiology.  Fevers now improved.  All cultures negative to date.    enterococcal bacteremia likely intra-abdominal source, cleared on vanco    Problem/Plan - 1:  ·  Problem: Enterococcal bacteremia.  Plan: -suspect abd source  -MATEO negative on 1/23  -cont vanco, zosyn. Vanco dose increased to 500 mg IV q12. Check trough pre 4th dose.  -Ct abd/pelvis when stable  -repeat bcx negative.     Problem/Plan - 2:  ·  Problem: Acute respiratory failure.  Plan: -per MICU.     Problem/Plan - 3:  ·  Problem: Fever.  Plan: -trend temps. Improved now.  -consider CT when stable if fevers persist.     Problem/Plan - 4:  ·  Problem: Medication monitoring encounter.  Plan: -monitor vanco trough  -monitor creatinine (has been stable).     Problem/Plan - 5:  Diarrhea - Would r/o c diff. Seems less likely but check c diff.  - If positive start vanco 125 mg po q6.    Please call ID with questions/change in status today    Sammie Laboy MD  [phone] (pager)  [phone] (office)

## 2019-02-03 NOTE — CHART NOTE - NSCHARTNOTEFT_GEN_A_CORE
Unable to get in touch with MICU earlier today to increase NPH. Would increase NPH to 10 units q6.    Elizabeth Lucero MD  Endocrine Fellow

## 2019-02-03 NOTE — PROGRESS NOTE ADULT - ATTENDING COMMENTS
Recurrent respiratory failure secondary to multiple etiologies, now likely due to HAP and weakness.  Continue MV, adjusting vent setting to provide lung protective ventilation, check ABG. May need prolonged weaning, trach.  Decreasing sedation to improve respiratory effort and therefore oxygenation, patient's sats improve with cough and voluntary deep breath supporting shunt etiology of hypoxia. Continue diuresis.  Continue abx for HAP and h/o bacteramie.  Continue wound care.

## 2019-02-03 NOTE — PROGRESS NOTE ADULT - SUBJECTIVE AND OBJECTIVE BOX
INTERVAL HPI/OVERNIGHT EVENTS:  Overnight: no events  SUBJECTIVE: Patient seen and examined at bedside. Patient is intubated and sedated     ROS: unable to assess due to intubation and sedation     OBJECTIVE:    VITAL SIGNS:  ICU Vital Signs Last 24 Hrs  T(C): 37.2 (2019 04:00), Max: 37.7 (2019 12:00)  T(F): 98.9 (2019 04:00), Max: 99.8 (2019 12:00)  HR: 89 (2019 06:58) (89 - 107)  BP: 119/70 (2019 06:00) (91/49 - 129/70)  BP(mean): 81 (2019 06:00) (58 - 85)  ABP: --  ABP(mean): --  RR: 25 (2019 06:00) (22 - 28)  SpO2: 91% (2019 06:58) (90% - 97%)    Mode: AC/ CMV (Assist Control/ Continuous Mandatory Ventilation), RR (machine): 25, TV (machine): 350, FiO2: 85, PEEP: 12, MAP: 18, PIP: 37    02- @ 07:01  -  02-03 @ 07:00  --------------------------------------------------------  IN: 3439.1 mL / OUT: 1750 mL / NET: 1689.1 mL      CAPILLARY BLOOD GLUCOSE      POCT Blood Glucose.: 261 mg/dL (2019 05:37)    PHYSICAL EXAM:  GENERAL: NAD, comfortable   EYES: constricted and minimally reactive to light, conjunctiva and sclera clear  CHEST/LUNG: Clear to auscultation bilaterally anteriorly   HEART: Regular rate and rhythm; No murmurs, rubs, or gallops  ABDOMEN: Soft, Nondistended; Bowel sounds present  EXTREMITIES:  2+ Peripheral Pulses, No clubbing, cyanosis, or edema  NEUROLOGY: unable to assess due to sedation   SKIN: No rashes or lesions    MEDICATIONS:  MEDICATIONS  (STANDING):  chlorhexidine 0.12% Liquid 15 milliLiter(s) Swish and Spit every 12 hours  chlorhexidine 4% Liquid 1 Application(s) Topical <User Schedule>  collagenase Ointment 1 Application(s) Topical daily  dextrose 5%. 1000 milliLiter(s) (50 mL/Hr) IV Continuous <Continuous>  dextrose 50% Injectable 12.5 Gram(s) IV Push once  dextrose 50% Injectable 25 Gram(s) IV Push once  dextrose 50% Injectable 25 Gram(s) IV Push once  fentaNYL   Infusion. 0.5 MICROgram(s)/kG/Hr (5.61 mL/Hr) IV Continuous <Continuous>  heparin  Injectable 7500 Unit(s) SubCutaneous every 8 hours  influenza   Vaccine 0.5 milliLiter(s) IntraMuscular once  insulin lispro (HumaLOG) corrective regimen sliding scale   SubCutaneous every 6 hours  insulin NPH human recombinant 7 Unit(s) SubCutaneous every 6 hours  levothyroxine 137 MICROGram(s) Oral daily  norepinephrine Infusion 0.05 MICROgram(s)/kG/Min (5.259 mL/Hr) IV Continuous <Continuous>  petrolatum Ophthalmic Ointment 1 Application(s) Both EYES every 12 hours  piperacillin/tazobactam IVPB. 3.375 Gram(s) IV Intermittent every 8 hours  predniSONE   Tablet 10 milliGRAM(s) Oral daily  propofol Infusion 50 MICROgram(s)/kG/Min (33.66 mL/Hr) IV Continuous <Continuous>  simvastatin 20 milliGRAM(s) Oral at bedtime  vancomycin  IVPB 500 milliGRAM(s) IV Intermittent every 12 hours    MEDICATIONS  (PRN):  dextrose 40% Gel 15 Gram(s) Oral once PRN Blood Glucose LESS THAN 70 milliGRAM(s)/deciliter  glucagon  Injectable 1 milliGRAM(s) IntraMuscular once PRN Glucose LESS THAN 70 milligrams/deciliter      ALLERGIES:  Allergies    No Known Allergies    Intolerances    Seroquel (Other)      LABS:                        8.8    11.50 )-----------( 424      ( 2019 02:40 )             30.5     02-03    142  |  106  |  9   ----------------------------<  280<H>  4.6   |  22  |  0.70    Ca    7.7<L>      2019 02:40  Phos  1.9     02-03  Mg     2.1     02-03    TPro  6.2  /  Alb  2.4<L>  /  TBili  0.2  /  DBili  x   /  AST  40<H>  /  ALT  14  /  AlkPhos  124<H>        Urinalysis Basic - ( 2019 11:10 )    Color: LIGHT YELLOW / Appearance: CLEAR / S.015 / pH: 5.5  Gluc: NEGATIVE / Ketone: TRACE  / Bili: NEGATIVE / Urobili: NORMAL   Blood: NEGATIVE / Protein: 20 / Nitrite: NEGATIVE   Leuk Esterase: SMALL / RBC: 0-2 / WBC 11-25   Sq Epi: OCC / Non Sq Epi: x / Bacteria: NEGATIVE        RADIOLOGY & ADDITIONAL TESTS: Reviewed.

## 2019-02-03 NOTE — PROGRESS NOTE ADULT - SUBJECTIVE AND OBJECTIVE BOX
63yPatient is a 63y old  Female who presents with a chief complaint of respiratory failure (02 Feb 2019 19:47)      Interval history:  In MICU  ROS: unable - intubated  Rectal tube placed for diarrhea overnight.  Low grade temp 100  Still on pressors but dose titrating down compared to before  Minimal secretions        Antimicrobials:    piperacillin/tazobactam IVPB. 3.375 Gram(s) IV Intermittent every 8 hours  vancomycin  IVPB 500 milliGRAM(s) IV Intermittent every 12 hours      Vital Signs Last 24 Hrs  T(C): 37 (02-03-19 @ 08:00), Max: 37.7 (02-02-19 @ 12:00)  T(F): 98.6 (02-03-19 @ 08:00), Max: 99.8 (02-02-19 @ 12:00)  HR: 98 (02-03-19 @ 08:00) (89 - 106)  BP: 110/67 (02-03-19 @ 08:00) (91/49 - 128/56)  BP(mean): 75 (02-03-19 @ 08:00) (58 - 84)  RR: 29 (02-03-19 @ 08:00) (22 - 29)  SpO2: 82% (02-03-19 @ 08:00) (82% - 97%)    PHYSICAL EXAM:  GEN: Intubated and sedated  HEENT: +ETT, +OGT  NECK: Left TLC  PULM: Rhonchi b/l  GI: +BS, soft, +Rectal tube                            8.8    11.50 )-----------( 424      ( 03 Feb 2019 02:40 )             30.5   02-03    142  |  106  |  9   ----------------------------<  280<H>  4.6   |  22  |  0.70    Ca    7.7<L>      03 Feb 2019 02:40  Phos  1.9     02-03  Mg     2.1     02-03    TPro  6.2  /  Alb  2.4<L>  /  TBili  0.2  /  DBili  x   /  AST  40<H>  /  ALT  14  /  AlkPhos  124<H>  02-03      LIVER FUNCTIONS - ( 03 Feb 2019 02:40 )  Alb: 2.4 g/dL / Pro: 6.2 g/dL / ALK PHOS: 124 u/L / ALT: 14 u/L / AST: 40 u/L / GGT: x           Vancomycin Level, Trough: 13.8:    RECENT CULTURES:    Culture - Respiratory with Gram Stain (02.02.19 @ 11:48)    Gram Stain Sputum:   GPCPR^Gram Pos Cocci in Pairs  QUANTITY OF BACTERIA SEEN: RARE (1+)  WBC^White Blood Cells  QNTY CELLS IN GRAM STAIN: MODERATE (3+)    Specimen Source: ENDOTRACHEAL SPECIMEN    Culture - Respiratory with Gram Stain (01.30.19 @ 08:39)    Gram Stain Sputum:   WBC^White Blood Cells  QNTY CELLS IN GRAM STAIN: MODERATE (3+)  EPI^Epithelial Cells  QNTY CELLS IN GRAM STAIN: MODERATE (3+)  YEAST^YEAST.  QUANTITY OF BACTERIA SEEN: RARE (1+)    Culture - Respiratory:   NRF^Normal Respiratory Perla  QUANTITY OF GROWTH: RARE    Specimen Source: SPUTUM      Culture - Blood (01.29.19 @ 22:40)    Culture - Blood:   NO ORGANISMS ISOLATED  NO ORGANISMS ISOLATED AT 96 HOURS    Specimen Source: BLOOD PERIPHERAL    Culture - Blood (01.29.19 @ 19:58)    Culture - Blood:   NO ORGANISMS ISOLATED  NO ORGANISMS ISOLATED AT 96 HOURS    Specimen Source: BLOOD PERIPHERAL        Clostridium difficile Toxin by PCR (01.25.19 @ 18:26)    Clostridium difficile Toxin by PCR: NotDetec:     Radiology:

## 2019-02-04 LAB
-  AMIKACIN: SIGNIFICANT CHANGE UP
-  AMPICILLIN/SULBACTAM: SIGNIFICANT CHANGE UP
-  AMPICILLIN: SIGNIFICANT CHANGE UP
-  AZTREONAM: SIGNIFICANT CHANGE UP
-  CEFAZOLIN: SIGNIFICANT CHANGE UP
-  CEFEPIME: SIGNIFICANT CHANGE UP
-  CEFOXITIN: SIGNIFICANT CHANGE UP
-  CEFTAZIDIME: SIGNIFICANT CHANGE UP
-  CEFTRIAXONE: SIGNIFICANT CHANGE UP
-  CIPROFLOXACIN: SIGNIFICANT CHANGE UP
-  ERTAPENEM: SIGNIFICANT CHANGE UP
-  GENTAMICIN: SIGNIFICANT CHANGE UP
-  IMIPENEM: SIGNIFICANT CHANGE UP
-  LEVOFLOXACIN: SIGNIFICANT CHANGE UP
-  MEROPENEM: SIGNIFICANT CHANGE UP
-  PIPERACILLIN/TAZOBACTAM: SIGNIFICANT CHANGE UP
-  TIGECYCLINE: SIGNIFICANT CHANGE UP
-  TOBRAMYCIN: SIGNIFICANT CHANGE UP
-  TRIMETHOPRIM/SULFAMETHOXAZOLE: SIGNIFICANT CHANGE UP
ANION GAP SERPL CALC-SCNC: 13 MMO/L — SIGNIFICANT CHANGE UP (ref 7–14)
ANION GAP SERPL CALC-SCNC: 15 MMO/L — HIGH (ref 7–14)
BACTERIA SPT RESP CULT: SIGNIFICANT CHANGE UP
BASE EXCESS BLDA CALC-SCNC: -2.2 MMOL/L — SIGNIFICANT CHANGE UP
BUN SERPL-MCNC: 8 MG/DL — SIGNIFICANT CHANGE UP (ref 7–23)
BUN SERPL-MCNC: 8 MG/DL — SIGNIFICANT CHANGE UP (ref 7–23)
CA-I BLDA-SCNC: 1.15 MMOL/L — SIGNIFICANT CHANGE UP (ref 1.15–1.29)
CALCIUM SERPL-MCNC: 7.7 MG/DL — LOW (ref 8.4–10.5)
CALCIUM SERPL-MCNC: 8 MG/DL — LOW (ref 8.4–10.5)
CHLORIDE SERPL-SCNC: 107 MMOL/L — SIGNIFICANT CHANGE UP (ref 98–107)
CHLORIDE SERPL-SCNC: 107 MMOL/L — SIGNIFICANT CHANGE UP (ref 98–107)
CO2 SERPL-SCNC: 20 MMOL/L — LOW (ref 22–31)
CO2 SERPL-SCNC: 23 MMOL/L — SIGNIFICANT CHANGE UP (ref 22–31)
CREAT SERPL-MCNC: 0.85 MG/DL — SIGNIFICANT CHANGE UP (ref 0.5–1.3)
CREAT SERPL-MCNC: 0.89 MG/DL — SIGNIFICANT CHANGE UP (ref 0.5–1.3)
GLUCOSE BLDA-MCNC: 260 MG/DL — HIGH (ref 70–99)
GLUCOSE BLDC GLUCOMTR-MCNC: 254 MG/DL — HIGH (ref 70–99)
GLUCOSE BLDC GLUCOMTR-MCNC: 339 MG/DL — HIGH (ref 70–99)
GLUCOSE BLDC GLUCOMTR-MCNC: 382 MG/DL — HIGH (ref 70–99)
GLUCOSE SERPL-MCNC: 252 MG/DL — HIGH (ref 70–99)
GLUCOSE SERPL-MCNC: 432 MG/DL — HIGH (ref 70–99)
GRAM STN SPT: SIGNIFICANT CHANGE UP
HCO3 BLDA-SCNC: 22 MMOL/L — SIGNIFICANT CHANGE UP (ref 22–26)
HCT VFR BLD CALC: 29.2 % — LOW (ref 34.5–45)
HCT VFR BLDA CALC: 26 % — LOW (ref 34.5–46.5)
HGB BLD-MCNC: 8.8 G/DL — LOW (ref 11.5–15.5)
HGB BLDA-MCNC: 8.4 G/DL — LOW (ref 11.5–15.5)
LACTATE BLDA-SCNC: 2.5 MMOL/L — HIGH (ref 0.5–2)
MAGNESIUM SERPL-MCNC: 1.8 MG/DL — SIGNIFICANT CHANGE UP (ref 1.6–2.6)
MAGNESIUM SERPL-MCNC: 1.9 MG/DL — SIGNIFICANT CHANGE UP (ref 1.6–2.6)
MCHC RBC-ENTMCNC: 30.1 % — LOW (ref 32–36)
MCHC RBC-ENTMCNC: 31.2 PG — SIGNIFICANT CHANGE UP (ref 27–34)
MCV RBC AUTO: 103.5 FL — HIGH (ref 80–100)
METHOD TYPE: SIGNIFICANT CHANGE UP
NRBC # FLD: 0.32 K/UL — LOW (ref 25–125)
NRBC FLD-RTO: 2.5 — SIGNIFICANT CHANGE UP
ORGANISM # SPEC MICROSCOPIC CNT: SIGNIFICANT CHANGE UP
ORGANISM # SPEC MICROSCOPIC CNT: SIGNIFICANT CHANGE UP
PCO2 BLDA: 56 MMHG — HIGH (ref 32–48)
PH BLDA: 7.26 PH — LOW (ref 7.35–7.45)
PHOSPHATE SERPL-MCNC: 1.6 MG/DL — LOW (ref 2.5–4.5)
PHOSPHATE SERPL-MCNC: 2.9 MG/DL — SIGNIFICANT CHANGE UP (ref 2.5–4.5)
PLATELET # BLD AUTO: 369 K/UL — SIGNIFICANT CHANGE UP (ref 150–400)
PMV BLD: 9.3 FL — SIGNIFICANT CHANGE UP (ref 7–13)
PO2 BLDA: 63 MMHG — LOW (ref 83–108)
POTASSIUM BLDA-SCNC: 3.4 MMOL/L — SIGNIFICANT CHANGE UP (ref 3.4–4.5)
POTASSIUM SERPL-MCNC: 3.2 MMOL/L — LOW (ref 3.5–5.3)
POTASSIUM SERPL-MCNC: 4.5 MMOL/L — SIGNIFICANT CHANGE UP (ref 3.5–5.3)
POTASSIUM SERPL-SCNC: 3.2 MMOL/L — LOW (ref 3.5–5.3)
POTASSIUM SERPL-SCNC: 4.5 MMOL/L — SIGNIFICANT CHANGE UP (ref 3.5–5.3)
RBC # BLD: 2.82 M/UL — LOW (ref 3.8–5.2)
RBC # FLD: 17.4 % — HIGH (ref 10.3–14.5)
SAO2 % BLDA: 89.6 % — LOW (ref 95–99)
SODIUM BLDA-SCNC: 142 MMOL/L — SIGNIFICANT CHANGE UP (ref 136–146)
SODIUM SERPL-SCNC: 142 MMOL/L — SIGNIFICANT CHANGE UP (ref 135–145)
SODIUM SERPL-SCNC: 143 MMOL/L — SIGNIFICANT CHANGE UP (ref 135–145)
WBC # BLD: 12.68 K/UL — HIGH (ref 3.8–10.5)
WBC # FLD AUTO: 12.68 K/UL — HIGH (ref 3.8–10.5)

## 2019-02-04 PROCEDURE — 99291 CRITICAL CARE FIRST HOUR: CPT

## 2019-02-04 PROCEDURE — 99232 SBSQ HOSP IP/OBS MODERATE 35: CPT

## 2019-02-04 RX ORDER — POTASSIUM CHLORIDE 20 MEQ
20 PACKET (EA) ORAL ONCE
Qty: 0 | Refills: 0 | Status: COMPLETED | OUTPATIENT
Start: 2019-02-04 | End: 2019-02-04

## 2019-02-04 RX ORDER — POTASSIUM CHLORIDE 20 MEQ
10 PACKET (EA) ORAL
Qty: 0 | Refills: 0 | Status: DISCONTINUED | OUTPATIENT
Start: 2019-02-04 | End: 2019-02-04

## 2019-02-04 RX ORDER — HUMAN INSULIN 100 [IU]/ML
10 INJECTION, SUSPENSION SUBCUTANEOUS EVERY 6 HOURS
Qty: 0 | Refills: 0 | Status: DISCONTINUED | OUTPATIENT
Start: 2019-02-04 | End: 2019-02-05

## 2019-02-04 RX ORDER — POTASSIUM PHOSPHATE, MONOBASIC POTASSIUM PHOSPHATE, DIBASIC 236; 224 MG/ML; MG/ML
15 INJECTION, SOLUTION INTRAVENOUS ONCE
Qty: 0 | Refills: 0 | Status: COMPLETED | OUTPATIENT
Start: 2019-02-04 | End: 2019-02-04

## 2019-02-04 RX ORDER — POTASSIUM CHLORIDE 20 MEQ
20 PACKET (EA) ORAL
Qty: 0 | Refills: 0 | Status: COMPLETED | OUTPATIENT
Start: 2019-02-04 | End: 2019-02-04

## 2019-02-04 RX ORDER — MIDAZOLAM HYDROCHLORIDE 1 MG/ML
4 INJECTION, SOLUTION INTRAMUSCULAR; INTRAVENOUS ONCE
Qty: 0 | Refills: 0 | Status: DISCONTINUED | OUTPATIENT
Start: 2019-02-04 | End: 2019-02-04

## 2019-02-04 RX ORDER — FENTANYL CITRATE 50 UG/ML
100 INJECTION INTRAVENOUS ONCE
Qty: 0 | Refills: 0 | Status: DISCONTINUED | OUTPATIENT
Start: 2019-02-04 | End: 2019-02-04

## 2019-02-04 RX ORDER — FUROSEMIDE 40 MG
40 TABLET ORAL EVERY 12 HOURS
Qty: 0 | Refills: 0 | Status: COMPLETED | OUTPATIENT
Start: 2019-02-04 | End: 2019-02-05

## 2019-02-04 RX ADMIN — HUMAN INSULIN 7 UNIT(S): 100 INJECTION, SUSPENSION SUBCUTANEOUS at 06:27

## 2019-02-04 RX ADMIN — CHLORHEXIDINE GLUCONATE 15 MILLILITER(S): 213 SOLUTION TOPICAL at 05:22

## 2019-02-04 RX ADMIN — HUMAN INSULIN 7 UNIT(S): 100 INJECTION, SUSPENSION SUBCUTANEOUS at 00:04

## 2019-02-04 RX ADMIN — MIDAZOLAM HYDROCHLORIDE 4 MILLIGRAM(S): 1 INJECTION, SOLUTION INTRAMUSCULAR; INTRAVENOUS at 06:22

## 2019-02-04 RX ADMIN — PROPOFOL 33.66 MICROGRAM(S)/KG/MIN: 10 INJECTION, EMULSION INTRAVENOUS at 20:33

## 2019-02-04 RX ADMIN — Medication 100 MILLIGRAM(S): at 11:45

## 2019-02-04 RX ADMIN — PIPERACILLIN AND TAZOBACTAM 25 GRAM(S): 4; .5 INJECTION, POWDER, LYOPHILIZED, FOR SOLUTION INTRAVENOUS at 06:28

## 2019-02-04 RX ADMIN — FENTANYL CITRATE 100 MICROGRAM(S): 50 INJECTION INTRAVENOUS at 07:43

## 2019-02-04 RX ADMIN — Medication 1 APPLICATION(S): at 17:51

## 2019-02-04 RX ADMIN — HUMAN INSULIN 10 UNIT(S): 100 INJECTION, SUSPENSION SUBCUTANEOUS at 17:50

## 2019-02-04 RX ADMIN — Medication 100 MILLIGRAM(S): at 00:16

## 2019-02-04 RX ADMIN — CHLORHEXIDINE GLUCONATE 15 MILLILITER(S): 213 SOLUTION TOPICAL at 17:49

## 2019-02-04 RX ADMIN — HEPARIN SODIUM 7500 UNIT(S): 5000 INJECTION INTRAVENOUS; SUBCUTANEOUS at 14:11

## 2019-02-04 RX ADMIN — HEPARIN SODIUM 7500 UNIT(S): 5000 INJECTION INTRAVENOUS; SUBCUTANEOUS at 05:22

## 2019-02-04 RX ADMIN — Medication 5.26 MICROGRAM(S)/KG/MIN: at 20:34

## 2019-02-04 RX ADMIN — Medication 100 MILLIEQUIVALENT(S): at 06:23

## 2019-02-04 RX ADMIN — PIPERACILLIN AND TAZOBACTAM 25 GRAM(S): 4; .5 INJECTION, POWDER, LYOPHILIZED, FOR SOLUTION INTRAVENOUS at 14:10

## 2019-02-04 RX ADMIN — PIPERACILLIN AND TAZOBACTAM 25 GRAM(S): 4; .5 INJECTION, POWDER, LYOPHILIZED, FOR SOLUTION INTRAVENOUS at 21:56

## 2019-02-04 RX ADMIN — FENTANYL CITRATE 100 MICROGRAM(S): 50 INJECTION INTRAVENOUS at 06:16

## 2019-02-04 RX ADMIN — SIMVASTATIN 20 MILLIGRAM(S): 20 TABLET, FILM COATED ORAL at 21:56

## 2019-02-04 RX ADMIN — Medication 10 MILLIGRAM(S): at 05:22

## 2019-02-04 RX ADMIN — Medication 4: at 11:46

## 2019-02-04 RX ADMIN — Medication 40 MILLIGRAM(S): at 14:10

## 2019-02-04 RX ADMIN — FENTANYL CITRATE 5.61 MICROGRAM(S)/KG/HR: 50 INJECTION INTRAVENOUS at 20:34

## 2019-02-04 RX ADMIN — Medication 1 APPLICATION(S): at 11:47

## 2019-02-04 RX ADMIN — SIMVASTATIN 20 MILLIGRAM(S): 20 TABLET, FILM COATED ORAL at 00:15

## 2019-02-04 RX ADMIN — Medication 100 MILLIEQUIVALENT(S): at 04:11

## 2019-02-04 RX ADMIN — CHLORHEXIDINE GLUCONATE 1 APPLICATION(S): 213 SOLUTION TOPICAL at 12:33

## 2019-02-04 RX ADMIN — Medication 137 MICROGRAM(S): at 05:22

## 2019-02-04 RX ADMIN — Medication 5: at 17:50

## 2019-02-04 RX ADMIN — Medication 2: at 00:04

## 2019-02-04 RX ADMIN — Medication 1 APPLICATION(S): at 05:22

## 2019-02-04 RX ADMIN — Medication 40 MILLIGRAM(S): at 11:45

## 2019-02-04 RX ADMIN — Medication 3: at 06:27

## 2019-02-04 RX ADMIN — HEPARIN SODIUM 7500 UNIT(S): 5000 INJECTION INTRAVENOUS; SUBCUTANEOUS at 21:57

## 2019-02-04 RX ADMIN — POTASSIUM PHOSPHATE, MONOBASIC POTASSIUM PHOSPHATE, DIBASIC 62.5 MILLIMOLE(S): 236; 224 INJECTION, SOLUTION INTRAVENOUS at 08:20

## 2019-02-04 RX ADMIN — HUMAN INSULIN 10 UNIT(S): 100 INJECTION, SUSPENSION SUBCUTANEOUS at 11:46

## 2019-02-04 NOTE — PROGRESS NOTE ADULT - PROBLEM SELECTOR PLAN 1
BG trending up. Also now on IV solumedrol.  Would increase NPH to 11 U q 6 hours (hold if feeds are held) plus increase to moderate dose humalog correctional scale q 6 hours  Goal glucose 100-180

## 2019-02-04 NOTE — PROGRESS NOTE ADULT - SUBJECTIVE AND OBJECTIVE BOX
Follow Up:      Inverval History/ROS:Patient is a 63y old  Female who presents with a chief complaint of SBO, respiratory failure (04 Feb 2019 13:26)    Intubated .  On pressors  Allergies    No Known Allergies    Intolerances    Seroquel (Other)      ANTIMICROBIALS:  piperacillin/tazobactam IVPB. 3.375 every 8 hours  vancomycin  IVPB 500 every 12 hours      OTHER MEDS:  chlorhexidine 0.12% Liquid 15 milliLiter(s) Swish and Spit every 12 hours  chlorhexidine 4% Liquid 1 Application(s) Topical <User Schedule>  collagenase Ointment 1 Application(s) Topical daily  dextrose 40% Gel 15 Gram(s) Oral once PRN  dextrose 5%. 1000 milliLiter(s) IV Continuous <Continuous>  dextrose 50% Injectable 12.5 Gram(s) IV Push once  dextrose 50% Injectable 25 Gram(s) IV Push once  dextrose 50% Injectable 25 Gram(s) IV Push once  fentaNYL   Infusion. 0.5 MICROgram(s)/kG/Hr IV Continuous <Continuous>  furosemide   Injectable 40 milliGRAM(s) IV Push every 12 hours  glucagon  Injectable 1 milliGRAM(s) IntraMuscular once PRN  heparin  Injectable 7500 Unit(s) SubCutaneous every 8 hours  influenza   Vaccine 0.5 milliLiter(s) IntraMuscular once  insulin lispro (HumaLOG) corrective regimen sliding scale   SubCutaneous every 6 hours  insulin NPH human recombinant 10 Unit(s) SubCutaneous every 6 hours  levothyroxine 137 MICROGram(s) Oral daily  methylPREDNISolone sodium succinate Injectable 40 milliGRAM(s) IV Push daily  norepinephrine Infusion 0.05 MICROgram(s)/kG/Min IV Continuous <Continuous>  petrolatum Ophthalmic Ointment 1 Application(s) Both EYES every 12 hours  propofol Infusion 50 MICROgram(s)/kG/Min IV Continuous <Continuous>  simvastatin 20 milliGRAM(s) Oral at bedtime      Vital Signs Last 24 Hrs  T(C): 37 (04 Feb 2019 12:00), Max: 37.3 (03 Feb 2019 20:00)  T(F): 98.6 (04 Feb 2019 12:00), Max: 99.1 (03 Feb 2019 20:00)  HR: 99 (04 Feb 2019 15:48) (93 - 127)  BP: 177/65 (04 Feb 2019 15:00) (95/51 - 177/65)  BP(mean): 77 (04 Feb 2019 15:00) (62 - 85)  RR: 25 (04 Feb 2019 15:00) (21 - 32)  SpO2: 92% (04 Feb 2019 15:48) (82% - 93%)    PHYSICAL EXAM:  General: [x ] intubated, sedated  HEAD/EYES: [ ] PERRL x] white sclera [ ] icterus  ENT:  [ ] normal [x ] supple [ ] thrush [ ] pharyngeal exudate  Cardiovascular:   [ ] murmur [x ] normal [ ] PPM/AICD  Respiratory:  [ ] clear to ausculation bilaterally  GI:  [x ] soft, non-tender, normal bowel sounds  :  [ ] iraheta [x ] no CVA tenderness   Musculoskeletal:  [x ] no synovitis  Neurologic:  [ ] non-focal exam   Skin:  [x ] mildline abd wound improving  Lymph: [ ] no lymphadenopathy  Psychiatric:  [ ] appropriate affect [ ] alert & oriented  Lines:  [x ] no phlebitis [ ] central line                                8.8    12.68 )-----------( 369      ( 04 Feb 2019 03:20 )             29.2       02-04    143  |  107  |  8   ----------------------------<  252<H>  3.2<L>   |  23  |  0.85    Ca    7.7<L>      04 Feb 2019 03:20  Phos  1.6     02-04  Mg     1.9     02-04    TPro  6.2  /  Alb  2.4<L>  /  TBili  0.2  /  DBili  x   /  AST  40<H>  /  ALT  14  /  AlkPhos  124<H>  02-03          MICROBIOLOGY:Culture - Respiratory:   KLP^Klebsiella pneumoniae  QUANTITY OF GROWTH: MODERATE (02-02-19 @ 11:48)  Culture - Respiratory:   Normal Respiratory Perla Also Present (02-02-19 @ 11:48)  Culture - Respiratory:   NRF^Normal Respiratory Perla  QUANTITY OF GROWTH: RARE (01-30-19 @ 08:39)      RADIOLOGY:

## 2019-02-04 NOTE — PROGRESS NOTE ADULT - SUBJECTIVE AND OBJECTIVE BOX
B-Team Surgery Progress Note     Subjective/24hour Events: patient on ventilator 100% FiO2 and PEEP of 15 and satting at 86%; GOC discussion ongoing with Palliative Care team. Son (HCP) would like to give the patient time on the ventilatory before making any decisions regarding tracheostomy      Vital Signs Last 24 Hrs  Vital Signs Last 24 Hrs  T(C): 37 (04 Feb 2019 12:00), Max: 37.3 (03 Feb 2019 20:00)  T(F): 98.6 (04 Feb 2019 12:00), Max: 99.1 (03 Feb 2019 20:00)  HR: 107 (04 Feb 2019 12:40) (93 - 127)  BP: 120/66 (04 Feb 2019 12:00) (95/51 - 120/74)  BP(mean): 78 (04 Feb 2019 12:00) (62 - 85)  RR: 25 (04 Feb 2019 12:00) (23 - 32)  SpO2: 89% (04 Feb 2019 12:40) (82% - 91%)    Physical Exam:  Gen: sedated  Pulm: on vent 100% FiO2 and PEEP of 15 and satting at 86%  Card: pulse regularly but tachycardic and vital sign stable per monitoring   GI: Obese, soft, non-distended, with fibrinous exudate over granulation tissue, dressing changed this morning

## 2019-02-04 NOTE — PROGRESS NOTE ADULT - ASSESSMENT
· Assessment		  63 f with DM, COPD, RA on chronic steroids, presented initially with SBO, underwent ex lap 12/15 closed with 4 retention sutures, evicerated on 12/30 and closed with 6 more retention sutures, c/b multifocal pneumonia, developed hypercapnic respiratory failure requiring intubation 1/6, s/p bronch 1/9/18, c/b Enterococci bacteremia, extubated and transferred to floor, still on vanco but developed fever and elevated WBC    new fever and respiratory failure ? etiology.  Fevers now improved.  All cultures negative to date.    enterococcal bacteremia likely intra-abdominal source, cleared on vanco    Problem/Plan - 1:  ·  Problem: Enterococcal bacteremia.  Plan: -suspect abd source  -MATEO negative on 1/23  -cont vanco, zosyn. Follow throughs.  -Ct abd/pelvis when stable  -repeat bcx negative.     Problem/Plan - 2:  ·  Problem: Acute respiratory failure.  Plan: -per MICU.   Sputum cx with klebsiella  On zosyn    Problem/Plan - 3:  ·  Problem: Fever.  Plan: -trend temps. Improved now.  -consider CT when stable if fevers persist.     Problem/Plan - 4:  ·  Problem: Medication monitoring encounter.  Plan: -monitor vanco trough  -monitor creatinine (has been stable).

## 2019-02-04 NOTE — PROGRESS NOTE ADULT - SUBJECTIVE AND OBJECTIVE BOX
CHIEF COMPLAINT:    Interval Events: started on 100% O2, still not saturating well    REVIEW OF SYSTEMS:  [ x] Unable to assess ROS because of mental status    OBJECTIVE:  ICU Vital Signs Last 24 Hrs  T(C): 37.1 (2019 04:00), Max: 37.3 (2019 20:00)  T(F): 98.7 (2019 04:00), Max: 99.1 (2019 20:00)  HR: 100 (2019 07:23) (83 - 127)  BP: 112/78 (2019 06:00) (95/51 - 120/72)  BP(mean): 85 (2019 06:00) (62 - 85)  RR: 29 (2019 07:23) (23 - 29)  SpO2: 86% (2019 07:23) (82% - 95%)    Mode: AC/ CMV (Assist Control/ Continuous Mandatory Ventilation), RR (machine): 25, TV (machine): 350, FiO2: 100, PEEP: 15, MAP: 21, PIP: 38    02-03 @ 07:01  -  02-04 @ 07:00  --------------------------------------------------------  IN: 2907.3 mL / OUT: 1510 mL / NET: 1397.3 mL    CAPILLARY BLOOD GLUCOSE    POCT Blood Glucose.: 254 mg/dL (2019 05:58)    PHYSICAL EXAM:  GENERAL: NAD, comfortable   EYES: constricted and minimally reactive to light, conjunctiva and sclera clear  CHEST/LUNG: Clear to auscultation bilaterally anteriorly   HEART: Regular rate and rhythm; No murmurs, rubs, or gallops  ABDOMEN: Soft, Nondistended; Bowel sounds present  EXTREMITIES:  2+ Peripheral Pulses, No clubbing, cyanosis, or edema  NEUROLOGY: unable to assess due to sedation   SKIN: No rashes or lesions    LINES:    HOSPITAL MEDICATIONS:  Standing Meds:  chlorhexidine 0.12% Liquid 15 milliLiter(s) Swish and Spit every 12 hours  chlorhexidine 4% Liquid 1 Application(s) Topical <User Schedule>  collagenase Ointment 1 Application(s) Topical daily  dextrose 5%. 1000 milliLiter(s) IV Continuous <Continuous>  dextrose 50% Injectable 12.5 Gram(s) IV Push once  dextrose 50% Injectable 25 Gram(s) IV Push once  dextrose 50% Injectable 25 Gram(s) IV Push once  fentaNYL   Infusion. 0.5 MICROgram(s)/kG/Hr IV Continuous <Continuous>  heparin  Injectable 7500 Unit(s) SubCutaneous every 8 hours  influenza   Vaccine 0.5 milliLiter(s) IntraMuscular once  insulin lispro (HumaLOG) corrective regimen sliding scale   SubCutaneous every 6 hours  insulin NPH human recombinant 7 Unit(s) SubCutaneous every 6 hours  levothyroxine 137 MICROGram(s) Oral daily  norepinephrine Infusion 0.05 MICROgram(s)/kG/Min IV Continuous <Continuous>  petrolatum Ophthalmic Ointment 1 Application(s) Both EYES every 12 hours  piperacillin/tazobactam IVPB. 3.375 Gram(s) IV Intermittent every 8 hours  potassium phosphate IVPB 15 milliMole(s) IV Intermittent once  predniSONE   Tablet 10 milliGRAM(s) Oral daily  propofol Infusion 50 MICROgram(s)/kG/Min IV Continuous <Continuous>  simvastatin 20 milliGRAM(s) Oral at bedtime  vancomycin  IVPB 500 milliGRAM(s) IV Intermittent every 12 hours    PRN Meds:  dextrose 40% Gel 15 Gram(s) Oral once PRN  glucagon  Injectable 1 milliGRAM(s) IntraMuscular once PRN    LABS:                        8.8    12.68 )-----------( 369      ( 2019 03:20 )             29.2     Hgb Trend: 8.8<--, 8.8<--, 9.1<--, 9.6<--, Test not performed 19 0641:  HEMOGLOBIN previously reported as: 6.9 *L g/dL<--  -    143  |  107  |  8   ----------------------------<  252<H>  3.2<L>   |  23  |  0.85    Ca    7.7<L>      2019 03:20  Phos  1.6     02-04  Mg     1.9     -04    TPro  6.2  /  Alb  2.4<L>  /  TBili  0.2  /  DBili  x   /  AST  40<H>  /  ALT  14  /  AlkPhos  124<H>      Creatinine Trend: 0.85<--, 0.70<--, 0.84<--, 0.96<--, 1.16<--, Test not performed 19 0640:  CREATININE previously reported as: 1.22  mg/dL<--    Urinalysis Basic - ( 2019 11:10 )    Color: LIGHT YELLOW / Appearance: CLEAR / S.015 / pH: 5.5  Gluc: NEGATIVE / Ketone: TRACE  / Bili: NEGATIVE / Urobili: NORMAL   Blood: NEGATIVE / Protein: 20 / Nitrite: NEGATIVE   Leuk Esterase: SMALL / RBC: 0-2 / WBC 11-25   Sq Epi: OCC / Non Sq Epi: x / Bacteria: NEGATIVE    Arterial Blood Gas:   @ 03:08  7.26/56/63/22/89.6/-2.2  ABG lactate: 2.5  Arterial Blood Gas:   @ 15:00  7.27/52/62/22/88.1/-2.6  ABG lactate: 3.5  Arterial Blood Gas:   @ 05:15  7.26/56/78/22/94.5/-1.9  ABG lactate: --  Arterial Blood Gas:   @ 15:24  7.32/51/64/24/88.9/0.1  ABG lactate: --    MICROBIOLOGY:     Culture - Respiratory with Gram Stain (collected 2019 11:48)  Source: ENDOTRACHEAL SPECIMEN  Preliminary Report (2019 11:33):    KLP^Klebsiella pneumoniae    QUANTITY OF GROWTH: MODERATE    Culture - Blood (collected 2019 11:45)  Source: BLOOD VENOUS  Preliminary Report (2019 11:43):    NO ORGANISMS ISOLATED    NO ORGANISMS ISOLATED AT 24 HOURS    Culture - Blood (collected 2019 11:45)  Source: BLOOD PERIPHERAL  Preliminary Report (2019 11:43):    NO ORGANISMS ISOLATED    NO ORGANISMS ISOLATED AT 24 HOURS

## 2019-02-04 NOTE — PROGRESS NOTE ADULT - ASSESSMENT
63F w/ multiple respiratory comorbidities p/w SBO s/p ex-lap with findings of healthy bowel c/b evisceration of bowel requiring placement of retention sutures, difficult to wean off ventilation, MATEO done 01/23 to evaluate endocarditis, patient extubated and downgraded to RCU 01/26 but readmitted to MICU due to hypoxic respiratory now reintubated with 100% FiO2 and PEEP of 15.     Plan  - The loose retention sutures in the middle were taken out   - Still on zosyn and vancomycin, afebrile with persistent leukocytosis; blood culture no growth and sputum culture growing Klebsiela   - Recommend CT chest, abdomen and pelvis with IV contrast once patient is stable for transport   - Continue BID dressing changes to midline wound.

## 2019-02-04 NOTE — PROGRESS NOTE ADULT - ASSESSMENT
63 year old female with PMHx of HTN, DM, hypothyroidism, RA (prednisone 10mg), pulmonary HTN, CHRIS, and COPD (3L at home) who was originally admitted on 12/15 for SBO s/p ex lap with lysis of adhesions (4 retention sutures) c/b 12/30 she eviscerated 2/2 a coughing episode s/p ex lap (6 retention sutures) c/b multifocal pneumonia, developed hypercapnic respiratory failure requiring intubation 1/6, s/p bronch 1/9/18, and extubated on 1/24, course further c/b Enterococci bacteremia now readmitted to MICU for hypoxic respiratory failure 2/2 atelectasis requiring intubation 01/31    #Neuro  - Sedated   - Continue propofol and fentanyl     #Respiratory   1) Hypoxic Respiratory Failure 2/2 atelectasis requiring intubation 1/31  -Bronchoscopy showing findings concerning for hyperdynamic airway collapse.   - Patient was deconditioned and unable to sit up and participate with PT leading to a functional shunt   - At this time, will continue ventilatory support   - Will give lasix 40IV given B lines on bedside US; still not responding     #Cardiovascular  1) Cardiogenic shock 2/2 iatrogenic   - Prior bedside US showing LVOT   - On levophed, will wean off as tolerated   - Holding amlodipine 10mg, furosemide 40mg, and metoprolol 25mg BID    - c/w Simvastatin 20mg     #GI  - SBO s/p ex-lap with findings of healthy bowel c/b evisceration of bowel requiring placement of retention sutures  - NPO with tube feeds   - Continue Protonix    #Renal/Metabolic  - Monitor for electrolyte derangements ; replete as needed  - trend BMP for BUN/Cr    #ID  Enterococci bacteremia  - Vancomycin D24  - ID team will inform us of duration of abx  - Due to fever of unknown origin, antibiotics were broadened for gram negative coverage- Zosyn D7  - Repeat infectious work up negative: UA, RVP  - Pending blood cultures- if all work up negative, will dc zosyn  - C diff pending as per ID recs     #Endocrine  1) Diabetes Mellitus Type 2  - As per Endocrine team, will cover with NPH 10U q6hrs   - Gabapentin 400 TID held for now     2) Hypothyroidism   - Levothyroxine 137     # Rheumatology  1) Rheumatoid arthritis   - Continue prednisone 10mg     #DVT ppx  - Heparin SQ     # Ethics  GOC discussion ongoing with Palliative Care team. Son (HCP) would like to give the patient time on the ventilatory before making any decisions regarding tracheostomy

## 2019-02-04 NOTE — PROGRESS NOTE ADULT - SUBJECTIVE AND OBJECTIVE BOX
Chief Complaint: DM2    History: Patient remains intubated, sedated.  On continuous enteral feeds with Glucerna @40 cc/hour x 24 hours.    MEDICATIONS  (STANDING):  chlorhexidine 0.12% Liquid 15 milliLiter(s) Swish and Spit every 12 hours  chlorhexidine 4% Liquid 1 Application(s) Topical <User Schedule>  collagenase Ointment 1 Application(s) Topical daily  dextrose 5%. 1000 milliLiter(s) (50 mL/Hr) IV Continuous <Continuous>  dextrose 50% Injectable 12.5 Gram(s) IV Push once  dextrose 50% Injectable 25 Gram(s) IV Push once  dextrose 50% Injectable 25 Gram(s) IV Push once  fentaNYL   Infusion. 0.5 MICROgram(s)/kG/Hr (5.61 mL/Hr) IV Continuous <Continuous>  furosemide   Injectable 40 milliGRAM(s) IV Push every 12 hours  heparin  Injectable 7500 Unit(s) SubCutaneous every 8 hours  influenza   Vaccine 0.5 milliLiter(s) IntraMuscular once  insulin lispro (HumaLOG) corrective regimen sliding scale   SubCutaneous every 6 hours  insulin NPH human recombinant 10 Unit(s) SubCutaneous every 6 hours  levothyroxine 137 MICROGram(s) Oral daily  methylPREDNISolone sodium succinate Injectable 40 milliGRAM(s) IV Push daily  norepinephrine Infusion 0.05 MICROgram(s)/kG/Min (5.259 mL/Hr) IV Continuous <Continuous>  petrolatum Ophthalmic Ointment 1 Application(s) Both EYES every 12 hours  piperacillin/tazobactam IVPB. 3.375 Gram(s) IV Intermittent every 8 hours  propofol Infusion 50 MICROgram(s)/kG/Min (33.66 mL/Hr) IV Continuous <Continuous>  simvastatin 20 milliGRAM(s) Oral at bedtime  vancomycin  IVPB 500 milliGRAM(s) IV Intermittent every 12 hours    MEDICATIONS  (PRN):  dextrose 40% Gel 15 Gram(s) Oral once PRN Blood Glucose LESS THAN 70 milliGRAM(s)/deciliter  glucagon  Injectable 1 milliGRAM(s) IntraMuscular once PRN Glucose LESS THAN 70 milligrams/deciliter      Allergies    No Known Allergies    Intolerances    Seroquel (Other)    Review of Systems:    UNABLE TO OBTAIN    PHYSICAL EXAM:  VITALS: T(C): 37 (02-04-19 @ 12:00)  T(F): 98.6 (02-04-19 @ 12:00), Max: 99.1 (02-03-19 @ 20:00)  HR: 100 (02-04-19 @ 14:00) (93 - 127)  BP: 120/66 (02-04-19 @ 14:00) (95/51 - 120/74)  RR:  (21 - 32)  SpO2:  (82% - 92%)  Wt(kg): --  GENERAL: NAD, cushingoid appearance  HEENT:  ETT on vent  RESPIRATORY: on vent  CARDIOVASCULAR: Regular rate and rhythm  GI: OGT with feeds infusing  PSYCH: cannot assess      CAPILLARY BLOOD GLUCOSE      POCT Blood Glucose.: 339 mg/dL (04 Feb 2019 11:42)  POCT Blood Glucose.: 254 mg/dL (04 Feb 2019 05:58)  POCT Blood Glucose.: 217 mg/dL (03 Feb 2019 23:50)  POCT Blood Glucose.: 292 mg/dL (03 Feb 2019 17:56)      02-04    143  |  107  |  8   ----------------------------<  252<H>  3.2<L>   |  23  |  0.85    EGFR if : 86  EGFR if non : 74    Ca    7.7<L>      02-04  Mg     1.9     02-04  Phos  1.6     02-04    TPro  6.2  /  Alb  2.4<L>  /  TBili  0.2  /  DBili  x   /  AST  40<H>  /  ALT  14  /  AlkPhos  124<H>  02-03          Thyroid Function Tests:      Hemoglobin A1C, Whole Blood: 7.1 % <H> [4.0 - 5.6] (12-17-18 @ 03:50)  Hemoglobin A1C, Whole Blood: 7.3 % <H> [4.0 - 5.6] (12-16-18 @ 04:17)

## 2019-02-04 NOTE — PROGRESS NOTE ADULT - ATTENDING COMMENTS
Agree with above. Seen and examined with residents on rounds. Intubated and sedated on 100% FiO2 and 15 PEEP, still with low PO2. Poor prognosis for functional recovery. GOC conversation with son. Will try inhaled nitric oxide. Supportive care.   Total CC time 35 min

## 2019-02-05 LAB
ALBUMIN SERPL ELPH-MCNC: 2.3 G/DL — LOW (ref 3.3–5)
ALP SERPL-CCNC: 159 U/L — HIGH (ref 40–120)
ALT FLD-CCNC: 18 U/L — SIGNIFICANT CHANGE UP (ref 4–33)
ANION GAP SERPL CALC-SCNC: 15 MMO/L — HIGH (ref 7–14)
AST SERPL-CCNC: 44 U/L — HIGH (ref 4–32)
BILIRUB SERPL-MCNC: < 0.2 MG/DL — LOW (ref 0.2–1.2)
BUN SERPL-MCNC: 10 MG/DL — SIGNIFICANT CHANGE UP (ref 7–23)
CALCIUM SERPL-MCNC: 7.7 MG/DL — LOW (ref 8.4–10.5)
CHLORIDE SERPL-SCNC: 107 MMOL/L — SIGNIFICANT CHANGE UP (ref 98–107)
CO2 SERPL-SCNC: 19 MMOL/L — LOW (ref 22–31)
CREAT SERPL-MCNC: 0.9 MG/DL — SIGNIFICANT CHANGE UP (ref 0.5–1.3)
GLUCOSE BLDC GLUCOMTR-MCNC: 250 MG/DL — HIGH (ref 70–99)
GLUCOSE BLDC GLUCOMTR-MCNC: 310 MG/DL — HIGH (ref 70–99)
GLUCOSE BLDC GLUCOMTR-MCNC: 368 MG/DL — HIGH (ref 70–99)
GLUCOSE BLDC GLUCOMTR-MCNC: 410 MG/DL — HIGH (ref 70–99)
GLUCOSE BLDC GLUCOMTR-MCNC: 419 MG/DL — HIGH (ref 70–99)
GLUCOSE BLDC GLUCOMTR-MCNC: 461 MG/DL — CRITICAL HIGH (ref 70–99)
GLUCOSE BLDC GLUCOMTR-MCNC: 492 MG/DL — CRITICAL HIGH (ref 70–99)
GLUCOSE SERPL-MCNC: 470 MG/DL — CRITICAL HIGH (ref 70–99)
MAGNESIUM SERPL-MCNC: 2 MG/DL — SIGNIFICANT CHANGE UP (ref 1.6–2.6)
PHOSPHATE SERPL-MCNC: 3.2 MG/DL — SIGNIFICANT CHANGE UP (ref 2.5–4.5)
POTASSIUM SERPL-MCNC: 5.3 MMOL/L — SIGNIFICANT CHANGE UP (ref 3.5–5.3)
POTASSIUM SERPL-SCNC: 5.3 MMOL/L — SIGNIFICANT CHANGE UP (ref 3.5–5.3)
PROT SERPL-MCNC: 6.3 G/DL — SIGNIFICANT CHANGE UP (ref 6–8.3)
SODIUM SERPL-SCNC: 141 MMOL/L — SIGNIFICANT CHANGE UP (ref 135–145)
VANCOMYCIN TROUGH SERPL-MCNC: 16.7 UG/ML — SIGNIFICANT CHANGE UP (ref 10–20)

## 2019-02-05 PROCEDURE — 99232 SBSQ HOSP IP/OBS MODERATE 35: CPT

## 2019-02-05 PROCEDURE — 99291 CRITICAL CARE FIRST HOUR: CPT

## 2019-02-05 RX ORDER — CEFTRIAXONE 500 MG/1
2 INJECTION, POWDER, FOR SOLUTION INTRAMUSCULAR; INTRAVENOUS EVERY 24 HOURS
Qty: 0 | Refills: 0 | Status: DISCONTINUED | OUTPATIENT
Start: 2019-02-06 | End: 2019-02-08

## 2019-02-05 RX ORDER — SODIUM CHLORIDE 9 MG/ML
1000 INJECTION, SOLUTION INTRAVENOUS
Qty: 0 | Refills: 0 | Status: DISCONTINUED | OUTPATIENT
Start: 2019-02-05 | End: 2019-02-10

## 2019-02-05 RX ORDER — GLUCAGON INJECTION, SOLUTION 0.5 MG/.1ML
1 INJECTION, SOLUTION SUBCUTANEOUS ONCE
Qty: 0 | Refills: 0 | Status: DISCONTINUED | OUTPATIENT
Start: 2019-02-05 | End: 2019-02-10

## 2019-02-05 RX ORDER — HUMAN INSULIN 100 [IU]/ML
20 INJECTION, SUSPENSION SUBCUTANEOUS EVERY 6 HOURS
Qty: 0 | Refills: 0 | Status: DISCONTINUED | OUTPATIENT
Start: 2019-02-05 | End: 2019-02-05

## 2019-02-05 RX ORDER — DEXTROSE 50 % IN WATER 50 %
25 SYRINGE (ML) INTRAVENOUS ONCE
Qty: 0 | Refills: 0 | Status: DISCONTINUED | OUTPATIENT
Start: 2019-02-05 | End: 2019-02-05

## 2019-02-05 RX ORDER — ACETAMINOPHEN 500 MG
650 TABLET ORAL EVERY 6 HOURS
Qty: 0 | Refills: 0 | Status: DISCONTINUED | OUTPATIENT
Start: 2019-02-05 | End: 2019-02-27

## 2019-02-05 RX ORDER — INSULIN LISPRO 100/ML
VIAL (ML) SUBCUTANEOUS EVERY 6 HOURS
Qty: 0 | Refills: 0 | Status: DISCONTINUED | OUTPATIENT
Start: 2019-02-05 | End: 2019-02-05

## 2019-02-05 RX ORDER — CEFTRIAXONE 500 MG/1
INJECTION, POWDER, FOR SOLUTION INTRAMUSCULAR; INTRAVENOUS
Qty: 0 | Refills: 0 | Status: DISCONTINUED | OUTPATIENT
Start: 2019-02-05 | End: 2019-02-08

## 2019-02-05 RX ORDER — HUMAN INSULIN 100 [IU]/ML
16 INJECTION, SUSPENSION SUBCUTANEOUS EVERY 6 HOURS
Qty: 0 | Refills: 0 | Status: DISCONTINUED | OUTPATIENT
Start: 2019-02-05 | End: 2019-02-05

## 2019-02-05 RX ORDER — INSULIN HUMAN 100 [IU]/ML
8 INJECTION, SOLUTION SUBCUTANEOUS
Qty: 100 | Refills: 0 | Status: DISCONTINUED | OUTPATIENT
Start: 2019-02-05 | End: 2019-02-06

## 2019-02-05 RX ORDER — DEXTROSE 50 % IN WATER 50 %
15 SYRINGE (ML) INTRAVENOUS ONCE
Qty: 0 | Refills: 0 | Status: DISCONTINUED | OUTPATIENT
Start: 2019-02-05 | End: 2019-02-05

## 2019-02-05 RX ORDER — INSULIN HUMAN 100 [IU]/ML
8 INJECTION, SOLUTION SUBCUTANEOUS ONCE
Qty: 0 | Refills: 0 | Status: COMPLETED | OUTPATIENT
Start: 2019-02-05 | End: 2019-02-05

## 2019-02-05 RX ORDER — CEFTRIAXONE 500 MG/1
2 INJECTION, POWDER, FOR SOLUTION INTRAMUSCULAR; INTRAVENOUS ONCE
Qty: 0 | Refills: 0 | Status: COMPLETED | OUTPATIENT
Start: 2019-02-05 | End: 2019-02-05

## 2019-02-05 RX ORDER — DEXTROSE 50 % IN WATER 50 %
12.5 SYRINGE (ML) INTRAVENOUS ONCE
Qty: 0 | Refills: 0 | Status: DISCONTINUED | OUTPATIENT
Start: 2019-02-05 | End: 2019-02-05

## 2019-02-05 RX ADMIN — PROPOFOL 33.66 MICROGRAM(S)/KG/MIN: 10 INJECTION, EMULSION INTRAVENOUS at 10:16

## 2019-02-05 RX ADMIN — Medication 1 APPLICATION(S): at 17:24

## 2019-02-05 RX ADMIN — FENTANYL CITRATE 5.61 MICROGRAM(S)/KG/HR: 50 INJECTION INTRAVENOUS at 17:24

## 2019-02-05 RX ADMIN — Medication 100 MILLIGRAM(S): at 00:33

## 2019-02-05 RX ADMIN — HUMAN INSULIN 16 UNIT(S): 100 INJECTION, SUSPENSION SUBCUTANEOUS at 05:08

## 2019-02-05 RX ADMIN — Medication 40 MILLIGRAM(S): at 00:49

## 2019-02-05 RX ADMIN — Medication 12: at 17:24

## 2019-02-05 RX ADMIN — Medication 5.26 MICROGRAM(S)/KG/MIN: at 19:44

## 2019-02-05 RX ADMIN — FENTANYL CITRATE 5.61 MICROGRAM(S)/KG/HR: 50 INJECTION INTRAVENOUS at 10:17

## 2019-02-05 RX ADMIN — Medication 12: at 11:34

## 2019-02-05 RX ADMIN — CHLORHEXIDINE GLUCONATE 15 MILLILITER(S): 213 SOLUTION TOPICAL at 17:24

## 2019-02-05 RX ADMIN — HUMAN INSULIN 20 UNIT(S): 100 INJECTION, SUSPENSION SUBCUTANEOUS at 17:24

## 2019-02-05 RX ADMIN — Medication 100 MILLIGRAM(S): at 11:35

## 2019-02-05 RX ADMIN — CHLORHEXIDINE GLUCONATE 1 APPLICATION(S): 213 SOLUTION TOPICAL at 11:12

## 2019-02-05 RX ADMIN — CEFTRIAXONE 100 GRAM(S): 500 INJECTION, POWDER, FOR SOLUTION INTRAMUSCULAR; INTRAVENOUS at 11:11

## 2019-02-05 RX ADMIN — PROPOFOL 33.66 MICROGRAM(S)/KG/MIN: 10 INJECTION, EMULSION INTRAVENOUS at 17:24

## 2019-02-05 RX ADMIN — PROPOFOL 33.66 MICROGRAM(S)/KG/MIN: 10 INJECTION, EMULSION INTRAVENOUS at 19:44

## 2019-02-05 RX ADMIN — Medication 6: at 00:32

## 2019-02-05 RX ADMIN — INSULIN HUMAN 8 UNIT(S): 100 INJECTION, SOLUTION SUBCUTANEOUS at 20:17

## 2019-02-05 RX ADMIN — HUMAN INSULIN 20 UNIT(S): 100 INJECTION, SUSPENSION SUBCUTANEOUS at 11:33

## 2019-02-05 RX ADMIN — Medication 100 MILLIGRAM(S): at 23:48

## 2019-02-05 RX ADMIN — INSULIN HUMAN 11 UNIT(S)/HR: 100 INJECTION, SOLUTION SUBCUTANEOUS at 19:43

## 2019-02-05 RX ADMIN — Medication 5.26 MICROGRAM(S)/KG/MIN: at 10:17

## 2019-02-05 RX ADMIN — Medication 1 APPLICATION(S): at 14:13

## 2019-02-05 RX ADMIN — Medication 137 MICROGRAM(S): at 05:09

## 2019-02-05 RX ADMIN — HEPARIN SODIUM 7500 UNIT(S): 5000 INJECTION INTRAVENOUS; SUBCUTANEOUS at 05:08

## 2019-02-05 RX ADMIN — Medication 40 MILLIGRAM(S): at 05:09

## 2019-02-05 RX ADMIN — Medication 650 MILLIGRAM(S): at 01:42

## 2019-02-05 RX ADMIN — SIMVASTATIN 20 MILLIGRAM(S): 20 TABLET, FILM COATED ORAL at 23:16

## 2019-02-05 RX ADMIN — Medication 12: at 05:09

## 2019-02-05 RX ADMIN — FENTANYL CITRATE 5.61 MICROGRAM(S)/KG/HR: 50 INJECTION INTRAVENOUS at 19:44

## 2019-02-05 RX ADMIN — Medication 5.26 MICROGRAM(S)/KG/MIN: at 17:24

## 2019-02-05 RX ADMIN — HEPARIN SODIUM 7500 UNIT(S): 5000 INJECTION INTRAVENOUS; SUBCUTANEOUS at 21:59

## 2019-02-05 RX ADMIN — HEPARIN SODIUM 7500 UNIT(S): 5000 INJECTION INTRAVENOUS; SUBCUTANEOUS at 14:13

## 2019-02-05 RX ADMIN — Medication 1 APPLICATION(S): at 05:07

## 2019-02-05 RX ADMIN — PIPERACILLIN AND TAZOBACTAM 25 GRAM(S): 4; .5 INJECTION, POWDER, LYOPHILIZED, FOR SOLUTION INTRAVENOUS at 05:09

## 2019-02-05 RX ADMIN — CHLORHEXIDINE GLUCONATE 15 MILLILITER(S): 213 SOLUTION TOPICAL at 05:07

## 2019-02-05 RX ADMIN — HUMAN INSULIN 10 UNIT(S): 100 INJECTION, SUSPENSION SUBCUTANEOUS at 00:33

## 2019-02-05 NOTE — PROGRESS NOTE ADULT - ATTENDING COMMENTS
Agree with above. Seen and examined with residents on rounds. Intubated on 100% FiO2, unable to maintain good oxygenation. Poor prognosis for functional recovery. Awaiting son's arrival for GOC discussion.  total CC time 35 min

## 2019-02-05 NOTE — PROGRESS NOTE ADULT - SUBJECTIVE AND OBJECTIVE BOX
B-Team Surgery Progress Note     Subjective/24hour Events: patient on ventilator 100% FiO2 and PEEP of 15 and satting at 86%, nitric oxide started; GOC discussion ongoing with Palliative Care team. Son (HCP) would like to give the patient time on the ventilatory before making any decisions regarding tracheostomy, febrile midnight      Vital Signs Last 24 Hrs  T(C): 37.5 (05 Feb 2019 08:00), Max: 38.1 (05 Feb 2019 00:00)  T(F): 99.5 (05 Feb 2019 08:00), Max: 100.6 (05 Feb 2019 00:00)  HR: 95 (05 Feb 2019 10:00) (95 - 114)  BP: 123/76 (05 Feb 2019 10:00) (94/57 - 177/65)  BP(mean): 89 (05 Feb 2019 10:00) (65 - 89)  RR: 25 (05 Feb 2019 10:00) (14 - 28)  SpO2: 91% (05 Feb 2019 10:00) (81% - 93%)    Physical Exam:  Gen: sedated  Pulm: on vent 100% FiO2 and PEEP of 15 and satting at 86%, on NO   Card: pulse regularly HR 98 and vital sign stable per monitoring   GI: Obese, soft, non-distended, with fibrinous exudate over granulation tissue, dressing changed this morning

## 2019-02-05 NOTE — PROGRESS NOTE ADULT - SUBJECTIVE AND OBJECTIVE BOX
Follow Up:      Inverval History/ROS:Patient is a 63y old  Female who presents with a chief complaint of SBO, respiratory failure (05 Feb 2019 10:19)    No fever  Intubated.      Allergies    No Known Allergies    Intolerances    Seroquel (Other)      ANTIMICROBIALS:  cefTRIAXone   IVPB    vancomycin  IVPB 500 every 12 hours      OTHER MEDS:  acetaminophen    Suspension .. 650 milliGRAM(s) Oral every 6 hours PRN  chlorhexidine 0.12% Liquid 15 milliLiter(s) Swish and Spit every 12 hours  chlorhexidine 4% Liquid 1 Application(s) Topical <User Schedule>  collagenase Ointment 1 Application(s) Topical daily  dextrose 40% Gel 15 Gram(s) Oral once PRN  dextrose 5%. 1000 milliLiter(s) IV Continuous <Continuous>  dextrose 50% Injectable 12.5 Gram(s) IV Push once  dextrose 50% Injectable 25 Gram(s) IV Push once  dextrose 50% Injectable 25 Gram(s) IV Push once  fentaNYL   Infusion. 0.5 MICROgram(s)/kG/Hr IV Continuous <Continuous>  glucagon  Injectable 1 milliGRAM(s) IntraMuscular once PRN  heparin  Injectable 7500 Unit(s) SubCutaneous every 8 hours  influenza   Vaccine 0.5 milliLiter(s) IntraMuscular once  insulin lispro (HumaLOG) corrective regimen sliding scale   SubCutaneous every 6 hours  insulin NPH human recombinant 20 Unit(s) SubCutaneous every 6 hours  levothyroxine 137 MICROGram(s) Oral daily  methylPREDNISolone sodium succinate Injectable 40 milliGRAM(s) IV Push daily  norepinephrine Infusion 0.05 MICROgram(s)/kG/Min IV Continuous <Continuous>  petrolatum Ophthalmic Ointment 1 Application(s) Both EYES every 12 hours  propofol Infusion 50 MICROgram(s)/kG/Min IV Continuous <Continuous>  simvastatin 20 milliGRAM(s) Oral at bedtime      Vital Signs Last 24 Hrs  T(C): 37.5 (05 Feb 2019 12:00), Max: 38.1 (05 Feb 2019 00:00)  T(F): 99.5 (05 Feb 2019 12:00), Max: 100.6 (05 Feb 2019 00:00)  HR: 97 (05 Feb 2019 15:00) (94 - 114)  BP: 119/68 (05 Feb 2019 15:00) (94/57 - 142/77)  BP(mean): 82 (05 Feb 2019 15:00) (65 - 95)  RR: 25 (05 Feb 2019 15:00) (14 - 25)  SpO2: 91% (05 Feb 2019 15:00) (81% - 93%)    PHYSICAL EXAM:  General: [x ] intubated  HEAD/EYES: [ ] PERRL [x ] white sclera [ ] icterus  ENT:  [ ] normal [x ] supple [ ] thrush [ ] pharyngeal exudate  Cardiovascular:   [ ] murmur [ x] normal [ ] PPM/AICD  Respiratory:  [x ] clear to ausculation bilaterally  GI:  [ x] soft, non-tender, normal bowel sounds  :  [ ] iraheta [ ] no CVA tenderness   Musculoskeletal:  [x ] no synovitis  Neurologic:  [ ] non-focal exam   Skin:  [ ] no rash  Lymph: [x ] no lymphadenopathy  Psychiatric:  [ ] appropriate affect [ ] alert & oriented  Lines:  [x no phlebitis [ ] central line                                8.8    12.68 )-----------( 369      ( 04 Feb 2019 03:20 )             29.2       02-05    141  |  107  |  10  ----------------------------<  470<HH>  5.3   |  19<L>  |  0.90    Ca    7.7<L>      05 Feb 2019 03:30  Phos  3.2     02-05  Mg     2.0     02-05    TPro  6.3  /  Alb  2.3<L>  /  TBili  < 0.2<L>  /  DBili  x   /  AST  44<H>  /  ALT  18  /  AlkPhos  159<H>  02-05          MICROBIOLOGY:Culture - Respiratory:   KLP^Klebsiella pneumoniae  QUANTITY OF GROWTH: MODERATE (02-02-19 @ 11:48)  Culture - Respiratory:   Normal Respiratory Perla Also Present (02-02-19 @ 11:48)  Culture - Respiratory:   NRF^Normal Respiratory Perla  QUANTITY OF GROWTH: RARE (01-30-19 @ 08:39)      RADIOLOGY:

## 2019-02-05 NOTE — PROGRESS NOTE ADULT - ASSESSMENT
· Assessment		  63 f with DM, COPD, RA on chronic steroids, presented initially with SBO, underwent ex lap 12/15 closed with 4 retention sutures, evicerated on 12/30 and closed with 6 more retention sutures, c/b multifocal pneumonia, developed hypercapnic respiratory failure requiring intubation 1/6, s/p bronch 1/9/18, c/b Enterococci bacteremia, extubated and transferred to floor, still on vanco but developed fever and elevated WBC    new fever and respiratory failure ? etiology.  Fevers now improved.  All cultures negative to date.    enterococcal bacteremia likely intra-abdominal source, cleared on vanco    Problem/Plan - 1:  ·  Problem: Enterococcal bacteremia.  Plan: -suspect abd source  -MATEO negative on 1/23  -cont vanco, zosyn. Follow throughs.  -Ct abd/pelvis when stable  -repeat bcx negative.   -completed course    Problem/Plan - 2:  ·  Problem: Acute respiratory failure.  Plan: -per MICU.   Sputum cx with klebsiella- Tx for pna  On zosyn/vacno  Had 7 days abx already to address kleb    Consider stopping abx in 48 hours      Problem/Plan - 3:  ·  Abd wound:  Continue wound care    Problem/Plan - 4:  ·  Problem: Medication monitoring encounter.  Plan: -monitor vanco trough  -monitor creatinine (has been stable).

## 2019-02-05 NOTE — PROGRESS NOTE ADULT - SUBJECTIVE AND OBJECTIVE BOX
CHIEF COMPLAINT:    Interval Events: Pt remains hypoxic over night; got another dose of 40 IV Lasix    REVIEW OF SYSTEMS:  [ x] Unable to assess ROS because intubated/sedated    OBJECTIVE:  ICU Vital Signs Last 24 Hrs  T(C): 36.3 (05 Feb 2019 04:00), Max: 38.1 (05 Feb 2019 00:00)  T(F): 97.3 (05 Feb 2019 04:00), Max: 100.6 (05 Feb 2019 00:00)  HR: 99 (05 Feb 2019 07:29) (98 - 114)  BP: 114/61 (05 Feb 2019 06:00) (94/57 - 177/65)  BP(mean): 73 (05 Feb 2019 06:00) (65 - 87)  RR: 25 (05 Feb 2019 06:00) (14 - 32)  SpO2: 87% (05 Feb 2019 07:29) (81% - 93%)    Mode: AC/ CMV (Assist Control/ Continuous Mandatory Ventilation), RR (machine): 25, TV (machine): 350, FiO2: 100, PEEP: 15, MAP: 21, PIP: 44    02-04 @ 07:01  -  02-05 @ 07:00  --------------------------------------------------------  IN: 3381 mL / OUT: 1505 mL / NET: 1876 mL    CAPILLARY BLOOD GLUCOSE    POCT Blood Glucose.: 492 mg/dL (05 Feb 2019 00:29)    PHYSICAL EXAM:  GENERAL: NAD, comfortable   EYES: constricted and minimally reactive to light, conjunctiva and sclera clear  CHEST/LUNG: Clear to auscultation bilaterally anteriorly   HEART: Regular rate and rhythm; No murmurs, rubs, or gallops  ABDOMEN: Soft, Nondistended; Bowel sounds present, dressing in place on L side of abdomen w/ retention sutures  EXTREMITIES:  2+ Peripheral Pulses, no appreciable edema  NEUROLOGY: unable to assess due to sedation   SKIN: No rashes or lesions    LINES:    HOSPITAL MEDICATIONS:  Standing Meds:  chlorhexidine 0.12% Liquid 15 milliLiter(s) Swish and Spit every 12 hours  chlorhexidine 4% Liquid 1 Application(s) Topical <User Schedule>  collagenase Ointment 1 Application(s) Topical daily  dextrose 5%. 1000 milliLiter(s) IV Continuous <Continuous>  dextrose 50% Injectable 12.5 Gram(s) IV Push once  dextrose 50% Injectable 25 Gram(s) IV Push once  dextrose 50% Injectable 25 Gram(s) IV Push once  fentaNYL   Infusion. 0.5 MICROgram(s)/kG/Hr IV Continuous <Continuous>  heparin  Injectable 7500 Unit(s) SubCutaneous every 8 hours  influenza   Vaccine 0.5 milliLiter(s) IntraMuscular once  insulin lispro (HumaLOG) corrective regimen sliding scale   SubCutaneous every 6 hours  insulin NPH human recombinant 16 Unit(s) SubCutaneous every 6 hours  levothyroxine 137 MICROGram(s) Oral daily  methylPREDNISolone sodium succinate Injectable 40 milliGRAM(s) IV Push daily  norepinephrine Infusion 0.05 MICROgram(s)/kG/Min IV Continuous <Continuous>  petrolatum Ophthalmic Ointment 1 Application(s) Both EYES every 12 hours  piperacillin/tazobactam IVPB. 3.375 Gram(s) IV Intermittent every 8 hours  propofol Infusion 50 MICROgram(s)/kG/Min IV Continuous <Continuous>  simvastatin 20 milliGRAM(s) Oral at bedtime  vancomycin  IVPB 500 milliGRAM(s) IV Intermittent every 12 hours      PRN Meds:  acetaminophen    Suspension .. 650 milliGRAM(s) Oral every 6 hours PRN  dextrose 40% Gel 15 Gram(s) Oral once PRN  glucagon  Injectable 1 milliGRAM(s) IntraMuscular once PRN      LABS:                        8.8    12.68 )-----------( 369      ( 04 Feb 2019 03:20 )             29.2     Hgb Trend: 8.8<--, 8.8<--, 9.1<--, 9.6<--, Test not performed 02/01/19 0641:  HEMOGLOBIN previously reported as: 6.9 *L g/dL<--  02-05    141  |  107  |  10  ----------------------------<  470<HH>  5.3   |  19<L>  |  0.90    Ca    7.7<L>      05 Feb 2019 03:30  Phos  3.2     02-05  Mg     2.0     02-05    TPro  6.3  /  Alb  2.3<L>  /  TBili  < 0.2<L>  /  DBili  x   /  AST  44<H>  /  ALT  18  /  AlkPhos  159<H>  02-05    Creatinine Trend: 0.90<--, 0.89<--, 0.85<--, 0.70<--, 0.84<--, 0.96<--      Arterial Blood Gas:  02-04 @ 03:08  7.26/56/63/22/89.6/-2.2  ABG lactate: 2.5  Arterial Blood Gas:  02-03 @ 15:00  7.27/52/62/22/88.1/-2.6  ABG lactate: 3.5    MICROBIOLOGY:     Culture - Respiratory with Gram Stain (collected 02 Feb 2019 11:48)  Source: ENDOTRACHEAL SPECIMEN  Preliminary Report (03 Feb 2019 11:33):    KLP^Klebsiella pneumoniae    QUANTITY OF GROWTH: MODERATE  Final Report (04 Feb 2019 11:54):    Normal Respiratory Perla Also Present  Organism: Klebsiella pneumoniae (04 Feb 2019 11:54)  Organism: Klebsiella pneumoniae  QUANTITY OF GROWTH: MODERATE (04 Feb 2019 11:54)    Culture - Blood (collected 02 Feb 2019 11:45)  Source: BLOOD VENOUS  Preliminary Report (04 Feb 2019 11:43):    NO ORGANISMS ISOLATED    NO ORGANISMS ISOLATED AT 48 HRS.    Culture - Blood (collected 02 Feb 2019 11:45)  Source: BLOOD PERIPHERAL  Preliminary Report (04 Feb 2019 11:43):    NO ORGANISMS ISOLATED    NO ORGANISMS ISOLATED AT 48 HRS.

## 2019-02-05 NOTE — PROGRESS NOTE ADULT - ASSESSMENT
63F w/ multiple respiratory comorbidities p/w SBO s/p ex-lap with findings of healthy bowel c/b evisceration of bowel requiring placement of retention sutures, difficult to wean off ventilation, MATEO done 01/23 to evaluate endocarditis, patient extubated and downgraded to RCU 01/26 but readmitted to MICU due to hypoxic respiratory now reintubated with 100% FiO2 and PEEP of 15, NO started.     Plan  - The loose retention sutures in the middle were taken out, continue BID dressing changes to midline wound.   - Still on zosyn and vancomycin, afebrile with persistent leukocytosis; blood culture no growth and sputum culture growing Klebsiela   - Recommend CT chest, abdomen and pelvis with IV contrast once patient is stable for transport   - Ongoing GOC discussion

## 2019-02-05 NOTE — PROGRESS NOTE ADULT - PROBLEM SELECTOR PLAN 1
BG trending up to 400s. Also now on IV solumedrol.  Agree with increase NPH to 20 U q 6 hours (hold if feeds are held) and continue moderate dose humalog correctional scale but can increase frequency to q3 or q4h.  However if glucose remains severely elevated would consider insulin drip protocol for correction of severe hyperglycemia. Alternatively would consider temporarily lowering the rate of enteral feeds to obtain better glycemic control.

## 2019-02-05 NOTE — CHART NOTE - NSCHARTNOTEFT_GEN_A_CORE
Met with son at bedside.  Pt intubated sedated.  Listen to son for 20 minutes.  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  She is aware of the overall medical condition and will be back in town the end of the week.  Will follow prn.  Emotional support provided.  Alexandra Salazar DO

## 2019-02-05 NOTE — PROGRESS NOTE ADULT - SUBJECTIVE AND OBJECTIVE BOX
Chief Complaint: DM2    History: Remains intubated, sedated.   Hyperglycemia worse to 400s.  On continuous enteral feeds Glucerna @ 40 cc/hour x 24 hours.    MEDICATIONS  (STANDING):  cefTRIAXone   IVPB      chlorhexidine 0.12% Liquid 15 milliLiter(s) Swish and Spit every 12 hours  chlorhexidine 4% Liquid 1 Application(s) Topical <User Schedule>  collagenase Ointment 1 Application(s) Topical daily  dextrose 5%. 1000 milliLiter(s) (50 mL/Hr) IV Continuous <Continuous>  dextrose 50% Injectable 12.5 Gram(s) IV Push once  dextrose 50% Injectable 25 Gram(s) IV Push once  dextrose 50% Injectable 25 Gram(s) IV Push once  fentaNYL   Infusion. 0.5 MICROgram(s)/kG/Hr (5.61 mL/Hr) IV Continuous <Continuous>  heparin  Injectable 7500 Unit(s) SubCutaneous every 8 hours  influenza   Vaccine 0.5 milliLiter(s) IntraMuscular once  insulin lispro (HumaLOG) corrective regimen sliding scale   SubCutaneous every 6 hours  insulin NPH human recombinant 20 Unit(s) SubCutaneous every 6 hours  levothyroxine 137 MICROGram(s) Oral daily  methylPREDNISolone sodium succinate Injectable 40 milliGRAM(s) IV Push daily  norepinephrine Infusion 0.05 MICROgram(s)/kG/Min (5.259 mL/Hr) IV Continuous <Continuous>  petrolatum Ophthalmic Ointment 1 Application(s) Both EYES every 12 hours  propofol Infusion 50 MICROgram(s)/kG/Min (33.66 mL/Hr) IV Continuous <Continuous>  simvastatin 20 milliGRAM(s) Oral at bedtime  vancomycin  IVPB 500 milliGRAM(s) IV Intermittent every 12 hours    MEDICATIONS  (PRN):  acetaminophen    Suspension .. 650 milliGRAM(s) Oral every 6 hours PRN Temp greater or equal to 38C (100.4F), Mild Pain (1 - 3), Moderate Pain (4 - 6)  dextrose 40% Gel 15 Gram(s) Oral once PRN Blood Glucose LESS THAN 70 milliGRAM(s)/deciliter  glucagon  Injectable 1 milliGRAM(s) IntraMuscular once PRN Glucose LESS THAN 70 milligrams/deciliter      Allergies    No Known Allergies    Intolerances    Seroquel (Other)    Review of Systems:    UNABLE TO OBTAIN    PHYSICAL EXAM:  VITALS: T(C): 37.2 (02-05-19 @ 16:00)  T(F): 98.9 (02-05-19 @ 16:00), Max: 100.6 (02-05-19 @ 00:00)  HR: 98 (02-05-19 @ 16:00) (94 - 114)  BP: 120/64 (02-05-19 @ 16:00) (94/57 - 142/77)  RR:  (14 - 25)  SpO2:  (81% - 92%)  Wt(kg): --  GENERAL: NAD  EYES: No proptosis, anicteric  HEENT:  Atraumatic, Normocephalic, ETT in place  RESPIRATORY: on vent  CARDIOVASCULAR: Regular rate and rhythm  GI: NGT with feeds infusing  PSYCH: cannot assess      CAPILLARY BLOOD GLUCOSE      POCT Blood Glucose.: 419 mg/dL (05 Feb 2019 10:55)  POCT Blood Glucose.: 492 mg/dL (05 Feb 2019 00:29)  POCT Blood Glucose.: 382 mg/dL (04 Feb 2019 17:47)      02-05    141  |  107  |  10  ----------------------------<  470<HH>  5.3   |  19<L>  |  0.90    EGFR if : 80  EGFR if non : 69    Ca    7.7<L>      02-05  Mg     2.0     02-05  Phos  3.2     02-05    TPro  6.3  /  Alb  2.3<L>  /  TBili  < 0.2<L>  /  DBili  x   /  AST  44<H>  /  ALT  18  /  AlkPhos  159<H>  02-05          Thyroid Function Tests:      Hemoglobin A1C, Whole Blood: 7.1 % <H> [4.0 - 5.6] (12-17-18 @ 03:50)  Hemoglobin A1C, Whole Blood: 7.3 % <H> [4.0 - 5.6] (12-16-18 @ 04:17)

## 2019-02-05 NOTE — PROGRESS NOTE ADULT - ASSESSMENT
63 year old female with PMHx of HTN, DM, hypothyroidism, RA (prednisone 10mg), pulmonary HTN, CHRIS, and COPD (3L at home) who was originally admitted on 12/15 for SBO s/p ex lap with lysis of adhesions (4 retention sutures) c/b 12/30 she eviscerated 2/2 a coughing episode s/p ex lap (6 retention sutures) c/b multifocal pneumonia, developed hypercapnic respiratory failure requiring intubation 1/6, s/p bronch 1/9/18, and extubated on 1/24, course further c/b Enterococci bacteremia now readmitted to MICU for hypoxic respiratory failure 2/2 atelectasis requiring intubation 01/31    #Neuro  - Sedated   - Continue propofol and fentanyl     #Respiratory   1) Hypoxic Respiratory Failure 2/2 atelectasis requiring intubation 1/31  -Bronchoscopy showing findings concerning for hyperdynamic airway collapse.   - Patient was deconditioned and unable to sit up and participate with PT leading to a functional shunt   - At this time, will continue ventilatory support   - will c/w diuresis w/ 40 IV Lasix  - on nitric oxide for pHTN - will escalate today as pt remains hypoxic    #Cardiovascular  1) Cardiogenic shock 2/2 iatrogenic   - Prior bedside US showing LVOT   - On levophed, will wean off as tolerated   - Holding amlodipine 10mg, furosemide 40mg, and metoprolol 25mg BID    - c/w Simvastatin 20mg     #GI  - SBO s/p ex-lap with findings of healthy bowel c/b evisceration of bowel requiring placement of retention sutures  - NPO with tube feeds   - Continue Protonix    #Renal/Metabolic  - Monitor for electrolyte derangements ; replete as needed  - trend BMP for BUN/Cr    #ID  Enterococci bacteremia  - Vancomycin D25  - ID team will inform us of duration of abx  - Due to fever of unknown origin, antibiotics were broadened for gram negative coverage- Zosyn D8  - Repeat infectious work up negative: UA, RVP  - Pending blood cultures- if all work up negative, will dc zosyn  - C diff negative    #Endocrine  1) Diabetes Mellitus Type 2  - As per Endocrine team, will cover with NPH 10U q6hrs   - Gabapentin 400 TID held for now     2) Hypothyroidism   - Levothyroxine 137     # Rheumatology  1) Rheumatoid arthritis   - Received 40 Solumedrol yesterday for stress dose steroids    #DVT ppx  - Heparin SQ     # Ethics  GOC discussion ongoing with Palliative Care team. Son (HCP) would like to give the patient time on the ventilatory before making any decisions regarding tracheostomy

## 2019-02-06 LAB
ALBUMIN SERPL ELPH-MCNC: 2.6 G/DL — LOW (ref 3.3–5)
ALP SERPL-CCNC: 161 U/L — HIGH (ref 40–120)
ALT FLD-CCNC: 15 U/L — SIGNIFICANT CHANGE UP (ref 4–33)
ANION GAP SERPL CALC-SCNC: 13 MMO/L — SIGNIFICANT CHANGE UP (ref 7–14)
ANISOCYTOSIS BLD QL: SLIGHT — SIGNIFICANT CHANGE UP
APTT BLD: 39.9 SEC — HIGH (ref 27.5–36.3)
AST SERPL-CCNC: 27 U/L — SIGNIFICANT CHANGE UP (ref 4–32)
BASOPHILS # BLD AUTO: 0.13 K/UL — SIGNIFICANT CHANGE UP (ref 0–0.2)
BASOPHILS NFR BLD AUTO: 0.6 % — SIGNIFICANT CHANGE UP (ref 0–2)
BASOPHILS NFR SPEC: 0 % — SIGNIFICANT CHANGE UP (ref 0–2)
BILIRUB SERPL-MCNC: < 0.2 MG/DL — LOW (ref 0.2–1.2)
BLASTS # FLD: 0 % — SIGNIFICANT CHANGE UP (ref 0–0)
BUN SERPL-MCNC: 14 MG/DL — SIGNIFICANT CHANGE UP (ref 7–23)
CALCIUM SERPL-MCNC: 8.3 MG/DL — LOW (ref 8.4–10.5)
CHLORIDE SERPL-SCNC: 103 MMOL/L — SIGNIFICANT CHANGE UP (ref 98–107)
CO2 SERPL-SCNC: 21 MMOL/L — LOW (ref 22–31)
CREAT SERPL-MCNC: 0.9 MG/DL — SIGNIFICANT CHANGE UP (ref 0.5–1.3)
EOSINOPHIL # BLD AUTO: 0.11 K/UL — SIGNIFICANT CHANGE UP (ref 0–0.5)
EOSINOPHIL NFR BLD AUTO: 0.5 % — SIGNIFICANT CHANGE UP (ref 0–6)
EOSINOPHIL NFR FLD: 1.9 % — SIGNIFICANT CHANGE UP (ref 0–6)
GIANT PLATELETS BLD QL SMEAR: PRESENT — SIGNIFICANT CHANGE UP
GLUCOSE BLDC GLUCOMTR-MCNC: 144 MG/DL — HIGH (ref 70–99)
GLUCOSE BLDC GLUCOMTR-MCNC: 152 MG/DL — HIGH (ref 70–99)
GLUCOSE BLDC GLUCOMTR-MCNC: 158 MG/DL — HIGH (ref 70–99)
GLUCOSE BLDC GLUCOMTR-MCNC: 163 MG/DL — HIGH (ref 70–99)
GLUCOSE BLDC GLUCOMTR-MCNC: 181 MG/DL — HIGH (ref 70–99)
GLUCOSE BLDC GLUCOMTR-MCNC: 182 MG/DL — HIGH (ref 70–99)
GLUCOSE BLDC GLUCOMTR-MCNC: 189 MG/DL — HIGH (ref 70–99)
GLUCOSE BLDC GLUCOMTR-MCNC: 204 MG/DL — HIGH (ref 70–99)
GLUCOSE BLDC GLUCOMTR-MCNC: 206 MG/DL — HIGH (ref 70–99)
GLUCOSE BLDC GLUCOMTR-MCNC: 219 MG/DL — HIGH (ref 70–99)
GLUCOSE BLDC GLUCOMTR-MCNC: 244 MG/DL — HIGH (ref 70–99)
GLUCOSE BLDC GLUCOMTR-MCNC: 267 MG/DL — HIGH (ref 70–99)
GLUCOSE BLDC GLUCOMTR-MCNC: 373 MG/DL — HIGH (ref 70–99)
GLUCOSE SERPL-MCNC: 172 MG/DL — HIGH (ref 70–99)
HCT VFR BLD CALC: 29.9 % — LOW (ref 34.5–45)
HGB BLD-MCNC: 8.8 G/DL — LOW (ref 11.5–15.5)
IMM GRANULOCYTES NFR BLD AUTO: 7.6 % — HIGH (ref 0–1.5)
INR BLD: 1.1 — SIGNIFICANT CHANGE UP (ref 0.88–1.17)
LYMPHOCYTES # BLD AUTO: 2.3 K/UL — SIGNIFICANT CHANGE UP (ref 1–3.3)
LYMPHOCYTES # BLD AUTO: 9.9 % — LOW (ref 13–44)
LYMPHOCYTES NFR SPEC AUTO: 9.6 % — LOW (ref 13–44)
MACROCYTES BLD QL: SLIGHT — SIGNIFICANT CHANGE UP
MAGNESIUM SERPL-MCNC: 2.1 MG/DL — SIGNIFICANT CHANGE UP (ref 1.6–2.6)
MCHC RBC-ENTMCNC: 29.4 % — LOW (ref 32–36)
MCHC RBC-ENTMCNC: 30.8 PG — SIGNIFICANT CHANGE UP (ref 27–34)
MCV RBC AUTO: 104.5 FL — HIGH (ref 80–100)
METAMYELOCYTES # FLD: 0 % — SIGNIFICANT CHANGE UP (ref 0–1)
MONOCYTES # BLD AUTO: 1.75 K/UL — HIGH (ref 0–0.9)
MONOCYTES NFR BLD AUTO: 7.5 % — SIGNIFICANT CHANGE UP (ref 2–14)
MONOCYTES NFR BLD: 6.7 % — SIGNIFICANT CHANGE UP (ref 2–9)
MYELOCYTES NFR BLD: 1.9 % — HIGH (ref 0–0)
NEUTROPHIL AB SER-ACNC: 78.9 % — HIGH (ref 43–77)
NEUTROPHILS # BLD AUTO: 17.2 K/UL — HIGH (ref 1.8–7.4)
NEUTROPHILS NFR BLD AUTO: 73.9 % — SIGNIFICANT CHANGE UP (ref 43–77)
NEUTS BAND # BLD: 1 % — SIGNIFICANT CHANGE UP (ref 0–6)
NRBC # BLD: 8 /100WBC — SIGNIFICANT CHANGE UP
NRBC # FLD: 1.47 K/UL — LOW (ref 25–125)
NRBC FLD-RTO: 6.3 — SIGNIFICANT CHANGE UP
OTHER - HEMATOLOGY %: 0 — SIGNIFICANT CHANGE UP
OVALOCYTES BLD QL SMEAR: SLIGHT — SIGNIFICANT CHANGE UP
PHOSPHATE SERPL-MCNC: 3.3 MG/DL — SIGNIFICANT CHANGE UP (ref 2.5–4.5)
PLATELET # BLD AUTO: 498 K/UL — HIGH (ref 150–400)
PLATELET COUNT - ESTIMATE: NORMAL — SIGNIFICANT CHANGE UP
PMV BLD: 9.8 FL — SIGNIFICANT CHANGE UP (ref 7–13)
POIKILOCYTOSIS BLD QL AUTO: SLIGHT — SIGNIFICANT CHANGE UP
POTASSIUM SERPL-MCNC: 3.7 MMOL/L — SIGNIFICANT CHANGE UP (ref 3.5–5.3)
POTASSIUM SERPL-SCNC: 3.7 MMOL/L — SIGNIFICANT CHANGE UP (ref 3.5–5.3)
PROMYELOCYTES # FLD: 0 % — SIGNIFICANT CHANGE UP (ref 0–0)
PROT SERPL-MCNC: 6.4 G/DL — SIGNIFICANT CHANGE UP (ref 6–8.3)
PROTHROM AB SERPL-ACNC: 12.2 SEC — SIGNIFICANT CHANGE UP (ref 9.8–13.1)
RBC # BLD: 2.86 M/UL — LOW (ref 3.8–5.2)
RBC # FLD: 18.2 % — HIGH (ref 10.3–14.5)
SCHISTOCYTES BLD QL AUTO: SLIGHT — SIGNIFICANT CHANGE UP
SMUDGE CELLS # BLD: PRESENT — SIGNIFICANT CHANGE UP
SODIUM SERPL-SCNC: 137 MMOL/L — SIGNIFICANT CHANGE UP (ref 135–145)
VARIANT LYMPHS # BLD: 0 % — SIGNIFICANT CHANGE UP
WBC # BLD: 23.26 K/UL — HIGH (ref 3.8–10.5)
WBC # FLD AUTO: 23.26 K/UL — HIGH (ref 3.8–10.5)

## 2019-02-06 PROCEDURE — 99232 SBSQ HOSP IP/OBS MODERATE 35: CPT

## 2019-02-06 PROCEDURE — 99291 CRITICAL CARE FIRST HOUR: CPT

## 2019-02-06 RX ORDER — INSULIN HUMAN 100 [IU]/ML
3 INJECTION, SOLUTION SUBCUTANEOUS
Qty: 100 | Refills: 0 | Status: DISCONTINUED | OUTPATIENT
Start: 2019-02-06 | End: 2019-02-07

## 2019-02-06 RX ADMIN — CHLORHEXIDINE GLUCONATE 15 MILLILITER(S): 213 SOLUTION TOPICAL at 05:18

## 2019-02-06 RX ADMIN — PROPOFOL 33.66 MICROGRAM(S)/KG/MIN: 10 INJECTION, EMULSION INTRAVENOUS at 11:38

## 2019-02-06 RX ADMIN — Medication 100 MILLIGRAM(S): at 11:37

## 2019-02-06 RX ADMIN — HEPARIN SODIUM 7500 UNIT(S): 5000 INJECTION INTRAVENOUS; SUBCUTANEOUS at 15:00

## 2019-02-06 RX ADMIN — PROPOFOL 33.66 MICROGRAM(S)/KG/MIN: 10 INJECTION, EMULSION INTRAVENOUS at 20:22

## 2019-02-06 RX ADMIN — Medication 5.26 MICROGRAM(S)/KG/MIN: at 20:23

## 2019-02-06 RX ADMIN — Medication 137 MICROGRAM(S): at 05:19

## 2019-02-06 RX ADMIN — INSULIN HUMAN 3 UNIT(S)/HR: 100 INJECTION, SOLUTION SUBCUTANEOUS at 11:39

## 2019-02-06 RX ADMIN — SIMVASTATIN 20 MILLIGRAM(S): 20 TABLET, FILM COATED ORAL at 22:30

## 2019-02-06 RX ADMIN — Medication 1 APPLICATION(S): at 18:12

## 2019-02-06 RX ADMIN — HEPARIN SODIUM 7500 UNIT(S): 5000 INJECTION INTRAVENOUS; SUBCUTANEOUS at 22:30

## 2019-02-06 RX ADMIN — HEPARIN SODIUM 7500 UNIT(S): 5000 INJECTION INTRAVENOUS; SUBCUTANEOUS at 05:19

## 2019-02-06 RX ADMIN — CHLORHEXIDINE GLUCONATE 1 APPLICATION(S): 213 SOLUTION TOPICAL at 11:38

## 2019-02-06 RX ADMIN — CEFTRIAXONE 100 GRAM(S): 500 INJECTION, POWDER, FOR SOLUTION INTRAMUSCULAR; INTRAVENOUS at 09:55

## 2019-02-06 RX ADMIN — FENTANYL CITRATE 5.61 MICROGRAM(S)/KG/HR: 50 INJECTION INTRAVENOUS at 11:40

## 2019-02-06 RX ADMIN — INSULIN HUMAN 4 UNIT(S)/HR: 100 INJECTION, SOLUTION SUBCUTANEOUS at 20:22

## 2019-02-06 RX ADMIN — CHLORHEXIDINE GLUCONATE 15 MILLILITER(S): 213 SOLUTION TOPICAL at 18:12

## 2019-02-06 RX ADMIN — Medication 40 MILLIGRAM(S): at 05:19

## 2019-02-06 RX ADMIN — Medication 5.26 MICROGRAM(S)/KG/MIN: at 11:39

## 2019-02-06 RX ADMIN — FENTANYL CITRATE 5.61 MICROGRAM(S)/KG/HR: 50 INJECTION INTRAVENOUS at 20:23

## 2019-02-06 RX ADMIN — Medication 1 APPLICATION(S): at 05:19

## 2019-02-06 RX ADMIN — Medication 1 APPLICATION(S): at 11:38

## 2019-02-06 NOTE — PROGRESS NOTE ADULT - ASSESSMENT
63 year old female with PMHx of HTN, DM, hypothyroidism, RA (prednisone 10mg), pulmonary HTN, CHRIS, and COPD (3L at home) who was originally admitted on 12/15 for SBO s/p ex lap with lysis of adhesions (4 retention sutures) c/b 12/30 she eviscerated 2/2 a coughing episode s/p ex lap (6 retention sutures) c/b multifocal pneumonia, developed hypercapnic respiratory failure requiring intubation 1/6, s/p bronch 1/9/18, and extubated on 1/24, course further c/b Enterococci bacteremia now readmitted to MICU for hypoxic respiratory failure 2/2 atelectasis requiring intubation 01/31    #Neuro  - Sedated   - Continue propofol and fentanyl     #Respiratory   1) Hypoxic Respiratory Failure 2/2 atelectasis requiring intubation 1/31  -Bronchoscopy showing findings concerning for hyperdynamic airway collapse.   - Patient was deconditioned and unable to sit up and participate with PT leading to a functional shunt   - At this time, will continue ventilatory support   - will c/w diuresis w/ 40 IV Lasix  - off nitric oxide since it did not improve oxygenation; oxygenation status continues to fluctuate    #Cardiovascular  1) Cardiogenic shock 2/2 iatrogenic   - Prior bedside US showing LVOT   - On levophed, will wean off as tolerated   - Holding amlodipine 10mg, furosemide 40mg, and metoprolol 25mg BID    - c/w Simvastatin 20mg     #GI  - SBO s/p ex-lap with findings of healthy bowel c/b evisceration of bowel requiring placement of retention sutures  - NPO with tube feeds   - Continue Protonix    #Renal/Metabolic  - Monitor for electrolyte derangements ; replete as needed  - trend BMP for BUN/Cr    #ID - will c/w antibiotics given open wound  Enterococci bacteremia  - Vancomycin D26  - ID team will inform us of duration of abx  - Due to fever of unknown origin, antibiotics were broadened for gram negative coverage- Zosyn D9  - Repeat infectious work up negative: UA, RVP  - C diff negative    #Endocrine  1) Diabetes Mellitus Type 2  - now on insulin gtt for uncontrolled FSG  - Gabapentin 400 TID held for now     2) Hypothyroidism   - Levothyroxine 137     # Rheumatology  1) Rheumatoid arthritis   - c/w Solumedrol 40 IV     #DVT ppx  - Heparin SQ     # Ethics  GOC discussion ongoing with Palliative Care team. Son (HCP) would like to give the patient time on the ventilatory before making any decisions regarding tracheostomy ; per palliative, daughter will be coming in this week to assist with decision making process 63 year old female with PMHx of HTN, DM, hypothyroidism, RA (prednisone 10mg), pulmonary HTN, CHRIS, and COPD (3L at home) who was originally admitted on 12/15 for SBO s/p ex lap with lysis of adhesions (4 retention sutures) c/b 12/30 she eviscerated 2/2 a coughing episode s/p ex lap (6 retention sutures) c/b multifocal pneumonia, developed hypercapnic respiratory failure requiring intubation 1/6, s/p bronch 1/9/18, and extubated on 1/24, course further c/b Enterococci bacteremia now readmitted to MICU for hypoxic respiratory failure 2/2 atelectasis requiring intubation 01/31    #Neuro  - Sedated   - Continue propofol and fentanyl     #Respiratory   1) Hypoxic Respiratory Failure 2/2 atelectasis requiring intubation 1/31  -Bronchoscopy showing findings concerning for hyperdynamic airway collapse.   - Patient was deconditioned and unable to sit up and participate with PT leading to a functional shunt   - At this time, will continue ventilatory support   - will c/w diuresis w/ 40 IV Lasix  - off nitric oxide since it did not improve oxygenation; oxygenation status continues to fluctuate    #Cardiovascular  1) Cardiogenic shock 2/2 iatrogenic   - Prior bedside US showing LVOT   - On levophed, will wean off as tolerated   - Holding amlodipine 10mg, furosemide 40mg, and metoprolol 25mg BID    - c/w Simvastatin 20mg     #GI  - SBO s/p ex-lap with findings of healthy bowel c/b evisceration of bowel requiring placement of retention sutures  - NPO with tube feeds   - Continue Protonix    #Renal/Metabolic  - Monitor for electrolyte derangements ; replete as needed  - trend BMP for BUN/Cr    #ID - will c/w antibiotics given open wound  Enterococci bacteremia  - Vancomycin D26  - ID team will inform us of duration of abx  - Due to fever of unknown origin, antibiotics were broadened for gram negative coverage- Ceftriaxone D2  - Repeat infectious work up negative: UA, RVP  - C diff negative    #Endocrine  1) Diabetes Mellitus Type 2  - now on insulin gtt for uncontrolled FSG  - Gabapentin 400 TID held for now     2) Hypothyroidism   - Levothyroxine 137     # Rheumatology  1) Rheumatoid arthritis   - c/w Solumedrol 40 IV     #DVT ppx  - Heparin SQ     # Ethics  GOC discussion ongoing with Palliative Care team. Son (HCP) would like to give the patient time on the ventilatory before making any decisions regarding tracheostomy ; per palliative, daughter will be coming in this week to assist with decision making process

## 2019-02-06 NOTE — PROGRESS NOTE ADULT - ATTENDING COMMENTS
terrible case, end stage lungs from mechanical constraint and underlying disease/on vent with severe hypoxemia/very guarded

## 2019-02-06 NOTE — PROGRESS NOTE ADULT - ASSESSMENT
· Assessment		  63 f with DM, COPD, RA on chronic steroids, presented initially with SBO, underwent ex lap 12/15 closed with 4 retention sutures, evicerated on 12/30 and closed with 6 more retention sutures, c/b multifocal pneumonia, developed hypercapnic respiratory failure requiring intubation 1/6, s/p bronch 1/9/18, c/b Enterococci bacteremia, extubated and transferred to floor, still on vanco but developed fever and elevated WBC    new fever and respiratory failure ? etiology.  Fevers now improved.  All cultures negative to date.    enterococcal bacteremia likely intra-abdominal source, cleared on vanco    Problem/Plan - 1:  ·  Problem: Enterococcal bacteremia.  Plan: -suspect abd source  -MATEO negative on 1/23  -cont vanco, zosyn. Follow throughs.  -Ct abd/pelvis when stable  -repeat bcx negative.   -completed course    Problem/Plan - 2:  ·  Problem: Acute respiratory failure.  Plan: -per MICU.   Sputum cx with klebsiella- Tx for pna  On ceftriaxone/ vanco  Had 7 days abx already to address kleb    Consider stopping abx in next 24-48 hours      Problem/Plan - 3:  ·  Abd wound:  Continue wound care    Problem/Plan - 4:  · Leukocytosis:  steroids started on 2/4  -Has loose stool, but not copious  If stool output increases, check C diff

## 2019-02-06 NOTE — PROGRESS NOTE ADULT - SUBJECTIVE AND OBJECTIVE BOX
Follow Up:      Inverval History/ROS:Patient is a 63y old  Female who presents with a chief complaint of sbo (05 Feb 2019 15:19)    Intubated. Sedated  On pressors.  Afebrile      Allergies    No Known Allergies    Intolerances    Seroquel (Other)      ANTIMICROBIALS:  cefTRIAXone   IVPB 2 every 24 hours  cefTRIAXone   IVPB    vancomycin  IVPB 500 every 12 hours      OTHER MEDS:  acetaminophen    Suspension .. 650 milliGRAM(s) Oral every 6 hours PRN  chlorhexidine 0.12% Liquid 15 milliLiter(s) Swish and Spit every 12 hours  chlorhexidine 4% Liquid 1 Application(s) Topical <User Schedule>  collagenase Ointment 1 Application(s) Topical daily  dextrose 5%. 1000 milliLiter(s) IV Continuous <Continuous>  fentaNYL   Infusion. 0.5 MICROgram(s)/kG/Hr IV Continuous <Continuous>  glucagon  Injectable 1 milliGRAM(s) IntraMuscular once PRN  heparin  Injectable 7500 Unit(s) SubCutaneous every 8 hours  influenza   Vaccine 0.5 milliLiter(s) IntraMuscular once  insulin Infusion 3 Unit(s)/Hr IV Continuous <Continuous>  levothyroxine 137 MICROGram(s) Oral daily  methylPREDNISolone sodium succinate Injectable 40 milliGRAM(s) IV Push daily  norepinephrine Infusion 0.05 MICROgram(s)/kG/Min IV Continuous <Continuous>  petrolatum Ophthalmic Ointment 1 Application(s) Both EYES every 12 hours  propofol Infusion 50 MICROgram(s)/kG/Min IV Continuous <Continuous>  simvastatin 20 milliGRAM(s) Oral at bedtime      Vital Signs Last 24 Hrs  T(C): 36.4 (06 Feb 2019 04:00), Max: 37.5 (05 Feb 2019 12:00)  T(F): 97.5 (06 Feb 2019 04:00), Max: 99.5 (05 Feb 2019 12:00)  HR: 94 (06 Feb 2019 09:00) (93 - 108)  BP: 140/83 (06 Feb 2019 09:00) (106/65 - 142/84)  BP(mean): 99 (06 Feb 2019 09:00) (77 - 100)  RR: 25 (06 Feb 2019 09:00) (14 - 33)  SpO2: 93% (06 Feb 2019 09:00) (87% - 95%)    PHYSICAL EXAM:  General: [x ] intubated  HEAD/EYES: [ ] PERRL [x ] white sclera [ ] icterus  ENT:  [ ] normal [x ] supple [ ] thrush [ ] pharyngeal exudate  Cardiovascular:   [ ] murmur [x ] normal [ ] PPM/AICD  Respiratory:  [ ] clear to ausculation bilaterally  GI:  [ x] soft, non-tender, normal bowel sounds  midline abd wound  :  [x ] iraheta [ ] no CVA tenderness   Musculoskeletal:  [x ] no synovitis  Neurologic:  [x ] non-focal exam/ sedated   Skin:  [x ] no rash  Lymph: [x ] no lymphadenopathy  Psychiatric:  [ ] appropriate affect [ ] alert & oriented  Lines:  [ ] no phlebitis [ ] central line                                8.8    23.26 )-----------( 498      ( 06 Feb 2019 03:40 )             29.9       02-06    137  |  103  |  14  ----------------------------<  172<H>  3.7   |  21<L>  |  0.90    Ca    8.3<L>      06 Feb 2019 03:40  Phos  3.3     02-06  Mg     2.1     02-06    TPro  6.4  /  Alb  2.6<L>  /  TBili  < 0.2<L>  /  DBili  x   /  AST  27  /  ALT  15  /  AlkPhos  161<H>  02-06          MICROBIOLOGY:Culture - Respiratory:   KLP^Klebsiella pneumoniae  QUANTITY OF GROWTH: MODERATE (02-02-19 @ 11:48)  Culture - Respiratory:   Normal Respiratory Perla Also Present (02-02-19 @ 11:48)      RADIOLOGY:

## 2019-02-06 NOTE — CHART NOTE - NSCHARTNOTEFT_GEN_A_CORE
Pt's son Dillon came in around 7:30 or so for a list of meds; per nurse manager's request, a handwritten list was given to the pt.  The son is very frustrated, and stated that there are things that "aren't legally supposed to happen are happening" and that he's going to have "go over everything with a fine-tooth comb."  The son mentioned multiple times what happened "on the sixth floor," how the "diastolic was 147 and no one called a doctor," and how "fake vitals" were entered.  He stated that "I've worked at [facility]" and "I know their policies."  He blames a nurse in the SICU for "leaving the stitches open" and "making up a fake vital."  The son wishes to be "kept in the loop" and wants to know "who the doctor is who put the order for the central line in."  The son stated that "he has a right to be frustrated" and that "he has no trust in this hospital."  He also mentioned that "once he goes over everything with Dr. Sheehan," he's going to come in "for a meeting."  He felt like he's been called "a liar," and wants to make sure that "there's no miscommunications" because "everything that's happened to her is because of a lapse in communication."  He became increasingly frustrated when he began speaking about trachs, stating that "everyone is shoving trach down my throat, and I know the reality of trachs."

## 2019-02-06 NOTE — PROGRESS NOTE ADULT - SUBJECTIVE AND OBJECTIVE BOX
B-Team Surgery Progress Note     Subjective/24hour Events: patient on ventilator 100% FiO2 and PEEP of 15 and satting at 86%, nitric oxide started; GOC discussion ongoing with Palliative Care team, daughter is coming to participate in the GOC    Vital Signs Last 24 Hrs  T(C): 36.4 (06 Feb 2019 08:00), Max: 37.5 (05 Feb 2019 12:00)  T(F): 97.5 (06 Feb 2019 08:00), Max: 99.5 (05 Feb 2019 12:00)  HR: 93 (06 Feb 2019 11:50) (92 - 108)  BP: 131/88 (06 Feb 2019 11:00) (106/65 - 142/84)  BP(mean): 101 (06 Feb 2019 11:00) (77 - 101)  RR: 25 (06 Feb 2019 11:50) (14 - 33)  SpO2: 94% (06 Feb 2019 11:50) (87% - 95%)    Physical Exam:  Gen: sedated  Pulm: on vent 100% FiO2 and PEEP of 15 and satting at 86%, on NO   Card: pulse regularly HR 98 and vital sign stable per monitoring, still on Levophed at 0.18  GI: Obese, soft, non-distended, with fibrinous exudate over granulation tissue, dressing changed this morning

## 2019-02-06 NOTE — PROGRESS NOTE ADULT - ASSESSMENT
63F w/ multiple respiratory comorbidities p/w SBO s/p ex-lap with findings of healthy bowel c/b evisceration of bowel requiring placement of retention sutures, difficult to wean off ventilation, MATEO done 01/23 to evaluate endocarditis, patient extubated and downgraded to RCU 01/26 but readmitted to MICU due to hypoxic respiratory now reintubated with 100% FiO2 and PEEP of 15, NO started.     Plan  - The loose retention sutures in the middle were taken out, continue BID dressing changes to midline wound.   - Still on zosyn and vancomycin, afebrile with persistent leukocytosis; blood culture no growth and sputum culture growing Klebsiela   - Recommend CT chest, abdomen and pelvis with IV contrast once patient is stable for transport   - Ongoing GOC discussion, daughter is coming to participate in the GOC 63F w/ multiple respiratory comorbidities p/w SBO s/p ex-lap with findings of healthy bowel c/b evisceration of bowel requiring placement of retention sutures, difficult to wean off ventilation, MATEO done 01/23 to evaluate endocarditis, patient extubated and downgraded to RCU 01/26 but readmitted to MICU due to hypoxic respiratory now reintubated with 100% FiO2 and PEEP of 15, NO started but discontinued today as it didn't make significant improvement    Plan  - The loose retention sutures in the middle were taken out, continue BID dressing changes to midline wound.   - Still on zosyn and vancomycin, afebrile with persistent leukocytosis; blood culture no growth and sputum culture growing Klebsiela   - Recommend CT chest, abdomen and pelvis with IV contrast once patient is stable for transport   - Ongoing GOC discussion, daughter is coming to participate in the GOC

## 2019-02-06 NOTE — PROGRESS NOTE ADULT - SUBJECTIVE AND OBJECTIVE BOX
CHIEF COMPLAINT:    Interval Events: no over night events     REVIEW OF SYSTEMS:  [x ] Unable to assess ROS because intubated    OBJECTIVE:  ICU Vital Signs Last 24 Hrs  T(C): 36.4 (06 Feb 2019 04:00), Max: 37.5 (05 Feb 2019 08:00)  T(F): 97.5 (06 Feb 2019 04:00), Max: 99.5 (05 Feb 2019 08:00)  HR: 94 (06 Feb 2019 07:00) (94 - 108)  BP: 135/82 (06 Feb 2019 07:00) (106/65 - 142/84)  BP(mean): 97 (06 Feb 2019 07:00) (77 - 100)  RR: 25 (06 Feb 2019 07:00) (14 - 33)  SpO2: 95% (06 Feb 2019 07:00) (84% - 95%)    Mode: AC/ CMV (Assist Control/ Continuous Mandatory Ventilation), RR (machine): 25, TV (machine): 350, FiO2: 100, PEEP: 15, MAP: 21, PIP: 42    02-05 @ 07:01  -  02-06 @ 07:00  --------------------------------------------------------  IN: 3274.2 mL / OUT: 1775 mL / NET: 1499.2 mL    CAPILLARY BLOOD GLUCOSE    POCT Blood Glucose.: 163 mg/dL (06 Feb 2019 07:21)    PHYSICAL EXAM:  GENERAL: NAD, comfortable   EYES: constricted and minimally reactive to light, conjunctiva and sclera clear  CHEST/LUNG: Clear to auscultation bilaterally anteriorly   HEART: Regular rate and rhythm; No murmurs, rubs, or gallops  ABDOMEN: Soft, Nondistended; Bowel sounds present, dressing in place on L side of abdomen w/ retention sutures  EXTREMITIES:  2+ Peripheral Pulses, no appreciable edema  NEUROLOGY: unable to assess due to sedation   SKIN: No rashes or lesions      LINES:    HOSPITAL MEDICATIONS:  Standing Meds:  cefTRIAXone   IVPB 2 Gram(s) IV Intermittent every 24 hours  cefTRIAXone   IVPB      chlorhexidine 0.12% Liquid 15 milliLiter(s) Swish and Spit every 12 hours  chlorhexidine 4% Liquid 1 Application(s) Topical <User Schedule>  collagenase Ointment 1 Application(s) Topical daily  dextrose 5%. 1000 milliLiter(s) IV Continuous <Continuous>  fentaNYL   Infusion. 0.5 MICROgram(s)/kG/Hr IV Continuous <Continuous>  heparin  Injectable 7500 Unit(s) SubCutaneous every 8 hours  influenza   Vaccine 0.5 milliLiter(s) IntraMuscular once  insulin Infusion 3 Unit(s)/Hr IV Continuous <Continuous>  levothyroxine 137 MICROGram(s) Oral daily  methylPREDNISolone sodium succinate Injectable 40 milliGRAM(s) IV Push daily  norepinephrine Infusion 0.05 MICROgram(s)/kG/Min IV Continuous <Continuous>  petrolatum Ophthalmic Ointment 1 Application(s) Both EYES every 12 hours  propofol Infusion 50 MICROgram(s)/kG/Min IV Continuous <Continuous>  simvastatin 20 milliGRAM(s) Oral at bedtime  vancomycin  IVPB 500 milliGRAM(s) IV Intermittent every 12 hours      PRN Meds:  acetaminophen    Suspension .. 650 milliGRAM(s) Oral every 6 hours PRN  glucagon  Injectable 1 milliGRAM(s) IntraMuscular once PRN      LABS:                        8.8    23.26 )-----------( 498      ( 06 Feb 2019 03:40 )             29.9     Hgb Trend: 8.8<--, 8.8<--, 8.8<--, 9.1<--, 9.6<--  02-06    137  |  103  |  14  ----------------------------<  172<H>  3.7   |  21<L>  |  0.90    Ca    8.3<L>      06 Feb 2019 03:40  Phos  3.3     02-06  Mg     2.1     02-06    TPro  6.4  /  Alb  2.6<L>  /  TBili  < 0.2<L>  /  DBili  x   /  AST  27  /  ALT  15  /  AlkPhos  161<H>  02-06    Creatinine Trend: 0.90<--, 0.90<--, 0.89<--, 0.85<--, 0.70<--, 0.84<--  PT/INR - ( 06 Feb 2019 03:40 )   PT: 12.2 SEC;   INR: 1.10        PTT - ( 06 Feb 2019 03:40 )  PTT:39.9 SEC

## 2019-02-07 LAB
ALBUMIN SERPL ELPH-MCNC: 2.5 G/DL — LOW (ref 3.3–5)
ALP SERPL-CCNC: 148 U/L — HIGH (ref 40–120)
ALT FLD-CCNC: 16 U/L — SIGNIFICANT CHANGE UP (ref 4–33)
ANION GAP SERPL CALC-SCNC: 12 MMO/L — SIGNIFICANT CHANGE UP (ref 7–14)
APTT BLD: 39.9 SEC — HIGH (ref 27.5–36.3)
AST SERPL-CCNC: 25 U/L — SIGNIFICANT CHANGE UP (ref 4–32)
BACTERIA BLD CULT: SIGNIFICANT CHANGE UP
BACTERIA BLD CULT: SIGNIFICANT CHANGE UP
BASE EXCESS BLDA CALC-SCNC: -2.7 MMOL/L — SIGNIFICANT CHANGE UP
BASE EXCESS BLDA CALC-SCNC: -2.9 MMOL/L — SIGNIFICANT CHANGE UP
BASE EXCESS BLDA CALC-SCNC: -4.2 MMOL/L — SIGNIFICANT CHANGE UP
BASOPHILS # BLD AUTO: 0.16 K/UL — SIGNIFICANT CHANGE UP (ref 0–0.2)
BASOPHILS NFR BLD AUTO: 0.8 % — SIGNIFICANT CHANGE UP (ref 0–2)
BILIRUB SERPL-MCNC: < 0.2 MG/DL — LOW (ref 0.2–1.2)
BUN SERPL-MCNC: 17 MG/DL — SIGNIFICANT CHANGE UP (ref 7–23)
CA-I BLDA-SCNC: 1.25 MMOL/L — SIGNIFICANT CHANGE UP (ref 1.15–1.29)
CA-I BLDA-SCNC: 1.28 MMOL/L — SIGNIFICANT CHANGE UP (ref 1.15–1.29)
CALCIUM SERPL-MCNC: 8.3 MG/DL — LOW (ref 8.4–10.5)
CHLORIDE BLDA-SCNC: 115 MMOL/L — HIGH (ref 96–108)
CHLORIDE SERPL-SCNC: 109 MMOL/L — HIGH (ref 98–107)
CO2 SERPL-SCNC: 22 MMOL/L — SIGNIFICANT CHANGE UP (ref 22–31)
CREAT SERPL-MCNC: 0.82 MG/DL — SIGNIFICANT CHANGE UP (ref 0.5–1.3)
EOSINOPHIL # BLD AUTO: 0.58 K/UL — HIGH (ref 0–0.5)
EOSINOPHIL NFR BLD AUTO: 2.8 % — SIGNIFICANT CHANGE UP (ref 0–6)
GLUCOSE BLDA-MCNC: 160 MG/DL — HIGH (ref 70–99)
GLUCOSE BLDA-MCNC: 229 MG/DL — HIGH (ref 70–99)
GLUCOSE BLDA-MCNC: 269 MG/DL — HIGH (ref 70–99)
GLUCOSE BLDC GLUCOMTR-MCNC: 151 MG/DL — HIGH (ref 70–99)
GLUCOSE BLDC GLUCOMTR-MCNC: 153 MG/DL — HIGH (ref 70–99)
GLUCOSE BLDC GLUCOMTR-MCNC: 156 MG/DL — HIGH (ref 70–99)
GLUCOSE BLDC GLUCOMTR-MCNC: 180 MG/DL — HIGH (ref 70–99)
GLUCOSE BLDC GLUCOMTR-MCNC: 191 MG/DL — HIGH (ref 70–99)
GLUCOSE BLDC GLUCOMTR-MCNC: 231 MG/DL — HIGH (ref 70–99)
GLUCOSE BLDC GLUCOMTR-MCNC: 257 MG/DL — HIGH (ref 70–99)
GLUCOSE BLDC GLUCOMTR-MCNC: 278 MG/DL — HIGH (ref 70–99)
GLUCOSE SERPL-MCNC: 134 MG/DL — HIGH (ref 70–99)
HCO3 BLDA-SCNC: 21 MMOL/L — LOW (ref 22–26)
HCO3 BLDA-SCNC: 22 MMOL/L — SIGNIFICANT CHANGE UP (ref 22–26)
HCO3 BLDA-SCNC: 22 MMOL/L — SIGNIFICANT CHANGE UP (ref 22–26)
HCT VFR BLD CALC: 28 % — LOW (ref 34.5–45)
HCT VFR BLDA CALC: 21.4 % — LOW (ref 34.5–46.5)
HCT VFR BLDA CALC: 23.5 % — LOW (ref 34.5–46.5)
HCT VFR BLDA CALC: 24.8 % — LOW (ref 34.5–46.5)
HGB BLD-MCNC: 8.1 G/DL — LOW (ref 11.5–15.5)
HGB BLDA-MCNC: 6.8 G/DL — CRITICAL LOW (ref 11.5–15.5)
HGB BLDA-MCNC: 7.5 G/DL — LOW (ref 11.5–15.5)
HGB BLDA-MCNC: 8 G/DL — LOW (ref 11.5–15.5)
IMM GRANULOCYTES NFR BLD AUTO: 5.7 % — HIGH (ref 0–1.5)
INR BLD: 1.04 — SIGNIFICANT CHANGE UP (ref 0.88–1.17)
LACTATE BLDA-SCNC: 1.5 MMOL/L — SIGNIFICANT CHANGE UP (ref 0.5–2)
LACTATE BLDA-SCNC: 1.7 MMOL/L — SIGNIFICANT CHANGE UP (ref 0.5–2)
LACTATE BLDA-SCNC: 2 MMOL/L — SIGNIFICANT CHANGE UP (ref 0.5–2)
LYMPHOCYTES # BLD AUTO: 11.5 % — LOW (ref 13–44)
LYMPHOCYTES # BLD AUTO: 2.38 K/UL — SIGNIFICANT CHANGE UP (ref 1–3.3)
MAGNESIUM SERPL-MCNC: 2.4 MG/DL — SIGNIFICANT CHANGE UP (ref 1.6–2.6)
MANUAL SMEAR VERIFICATION: SIGNIFICANT CHANGE UP
MCHC RBC-ENTMCNC: 28.9 % — LOW (ref 32–36)
MCHC RBC-ENTMCNC: 30.8 PG — SIGNIFICANT CHANGE UP (ref 27–34)
MCV RBC AUTO: 106.5 FL — HIGH (ref 80–100)
MONOCYTES # BLD AUTO: 1.66 K/UL — HIGH (ref 0–0.9)
MONOCYTES NFR BLD AUTO: 8 % — SIGNIFICANT CHANGE UP (ref 2–14)
NEUTROPHILS # BLD AUTO: 14.79 K/UL — HIGH (ref 1.8–7.4)
NEUTROPHILS NFR BLD AUTO: 71.2 % — SIGNIFICANT CHANGE UP (ref 43–77)
NRBC # FLD: 1.6 K/UL — LOW (ref 25–125)
NRBC FLD-RTO: 7.7 — SIGNIFICANT CHANGE UP
PCO2 BLDA: 49 MMHG — HIGH (ref 32–48)
PCO2 BLDA: 53 MMHG — HIGH (ref 32–48)
PCO2 BLDA: 53 MMHG — HIGH (ref 32–48)
PH BLDA: 7.25 PH — LOW (ref 7.35–7.45)
PH BLDA: 7.26 PH — LOW (ref 7.35–7.45)
PH BLDA: 7.29 PH — LOW (ref 7.35–7.45)
PHOSPHATE SERPL-MCNC: 3.1 MG/DL — SIGNIFICANT CHANGE UP (ref 2.5–4.5)
PLATELET # BLD AUTO: 448 K/UL — HIGH (ref 150–400)
PMV BLD: 9.9 FL — SIGNIFICANT CHANGE UP (ref 7–13)
PO2 BLDA: 100 MMHG — SIGNIFICANT CHANGE UP (ref 83–108)
PO2 BLDA: 61 MMHG — LOW (ref 83–108)
PO2 BLDA: 83 MMHG — SIGNIFICANT CHANGE UP (ref 83–108)
POTASSIUM BLDA-SCNC: 3.8 MMOL/L — SIGNIFICANT CHANGE UP (ref 3.4–4.5)
POTASSIUM BLDA-SCNC: 4.3 MMOL/L — SIGNIFICANT CHANGE UP (ref 3.4–4.5)
POTASSIUM BLDA-SCNC: 5.6 MMOL/L — HIGH (ref 3.4–4.5)
POTASSIUM SERPL-MCNC: 4.3 MMOL/L — SIGNIFICANT CHANGE UP (ref 3.5–5.3)
POTASSIUM SERPL-SCNC: 4.3 MMOL/L — SIGNIFICANT CHANGE UP (ref 3.5–5.3)
PROT SERPL-MCNC: 6 G/DL — SIGNIFICANT CHANGE UP (ref 6–8.3)
PROTHROM AB SERPL-ACNC: 11.9 SEC — SIGNIFICANT CHANGE UP (ref 9.8–13.1)
RBC # BLD: 2.63 M/UL — LOW (ref 3.8–5.2)
RBC # FLD: 19 % — HIGH (ref 10.3–14.5)
SAO2 % BLDA: 88.3 % — LOW (ref 95–99)
SAO2 % BLDA: 96.4 % — SIGNIFICANT CHANGE UP (ref 95–99)
SAO2 % BLDA: 98.2 % — SIGNIFICANT CHANGE UP (ref 95–99)
SODIUM BLDA-SCNC: 137 MMOL/L — SIGNIFICANT CHANGE UP (ref 136–146)
SODIUM BLDA-SCNC: 141 MMOL/L — SIGNIFICANT CHANGE UP (ref 136–146)
SODIUM BLDA-SCNC: 142 MMOL/L — SIGNIFICANT CHANGE UP (ref 136–146)
SODIUM SERPL-SCNC: 143 MMOL/L — SIGNIFICANT CHANGE UP (ref 135–145)
WBC # BLD: 20.75 K/UL — HIGH (ref 3.8–10.5)
WBC # FLD AUTO: 20.75 K/UL — HIGH (ref 3.8–10.5)

## 2019-02-07 PROCEDURE — 99232 SBSQ HOSP IP/OBS MODERATE 35: CPT

## 2019-02-07 PROCEDURE — 93308 TTE F-UP OR LMTD: CPT | Mod: 26

## 2019-02-07 PROCEDURE — 76604 US EXAM CHEST: CPT | Mod: 26

## 2019-02-07 PROCEDURE — 99291 CRITICAL CARE FIRST HOUR: CPT | Mod: 25

## 2019-02-07 RX ORDER — FENTANYL CITRATE 50 UG/ML
100 INJECTION INTRAVENOUS ONCE
Qty: 0 | Refills: 0 | Status: DISCONTINUED | OUTPATIENT
Start: 2019-02-07 | End: 2019-02-07

## 2019-02-07 RX ORDER — MIDAZOLAM HYDROCHLORIDE 1 MG/ML
4 INJECTION, SOLUTION INTRAMUSCULAR; INTRAVENOUS ONCE
Qty: 0 | Refills: 0 | Status: DISCONTINUED | OUTPATIENT
Start: 2019-02-07 | End: 2019-02-07

## 2019-02-07 RX ORDER — SIMVASTATIN 20 MG/1
20 TABLET, FILM COATED ORAL AT BEDTIME
Qty: 0 | Refills: 0 | Status: DISCONTINUED | OUTPATIENT
Start: 2019-02-07 | End: 2019-02-27

## 2019-02-07 RX ORDER — INSULIN HUMAN 100 [IU]/ML
5 INJECTION, SOLUTION SUBCUTANEOUS
Qty: 100 | Refills: 0 | Status: DISCONTINUED | OUTPATIENT
Start: 2019-02-07 | End: 2019-02-09

## 2019-02-07 RX ORDER — CISATRACURIUM BESYLATE 2 MG/ML
20 INJECTION INTRAVENOUS ONCE
Qty: 0 | Refills: 0 | Status: COMPLETED | OUTPATIENT
Start: 2019-02-07 | End: 2019-02-07

## 2019-02-07 RX ORDER — NOREPINEPHRINE BITARTRATE/D5W 8 MG/250ML
0.05 PLASTIC BAG, INJECTION (ML) INTRAVENOUS
Qty: 16 | Refills: 0 | Status: DISCONTINUED | OUTPATIENT
Start: 2019-02-07 | End: 2019-02-11

## 2019-02-07 RX ORDER — CISATRACURIUM BESYLATE 2 MG/ML
3 INJECTION INTRAVENOUS
Qty: 200 | Refills: 0 | Status: DISCONTINUED | OUTPATIENT
Start: 2019-02-07 | End: 2019-02-11

## 2019-02-07 RX ADMIN — CISATRACURIUM BESYLATE 20.2 MICROGRAM(S)/KG/MIN: 2 INJECTION INTRAVENOUS at 20:22

## 2019-02-07 RX ADMIN — FENTANYL CITRATE 100 MICROGRAM(S): 50 INJECTION INTRAVENOUS at 02:10

## 2019-02-07 RX ADMIN — Medication 5.26 MICROGRAM(S)/KG/MIN: at 20:22

## 2019-02-07 RX ADMIN — HEPARIN SODIUM 7500 UNIT(S): 5000 INJECTION INTRAVENOUS; SUBCUTANEOUS at 05:53

## 2019-02-07 RX ADMIN — CISATRACURIUM BESYLATE 20 MILLIGRAM(S): 2 INJECTION INTRAVENOUS at 09:56

## 2019-02-07 RX ADMIN — PROPOFOL 33.66 MICROGRAM(S)/KG/MIN: 10 INJECTION, EMULSION INTRAVENOUS at 10:15

## 2019-02-07 RX ADMIN — Medication 137 MICROGRAM(S): at 05:53

## 2019-02-07 RX ADMIN — Medication 40 MILLIGRAM(S): at 05:53

## 2019-02-07 RX ADMIN — CISATRACURIUM BESYLATE 20.2 MICROGRAM(S)/KG/MIN: 2 INJECTION INTRAVENOUS at 10:55

## 2019-02-07 RX ADMIN — FENTANYL CITRATE 5.61 MICROGRAM(S)/KG/HR: 50 INJECTION INTRAVENOUS at 10:16

## 2019-02-07 RX ADMIN — INSULIN HUMAN 3 UNIT(S)/HR: 100 INJECTION, SOLUTION SUBCUTANEOUS at 20:20

## 2019-02-07 RX ADMIN — INSULIN HUMAN 3 UNIT(S)/HR: 100 INJECTION, SOLUTION SUBCUTANEOUS at 10:16

## 2019-02-07 RX ADMIN — CHLORHEXIDINE GLUCONATE 15 MILLILITER(S): 213 SOLUTION TOPICAL at 18:33

## 2019-02-07 RX ADMIN — MIDAZOLAM HYDROCHLORIDE 4 MILLIGRAM(S): 1 INJECTION, SOLUTION INTRAMUSCULAR; INTRAVENOUS at 00:30

## 2019-02-07 RX ADMIN — CISATRACURIUM BESYLATE 20 MILLIGRAM(S): 2 INJECTION INTRAVENOUS at 02:10

## 2019-02-07 RX ADMIN — Medication 1 APPLICATION(S): at 18:33

## 2019-02-07 RX ADMIN — HEPARIN SODIUM 7500 UNIT(S): 5000 INJECTION INTRAVENOUS; SUBCUTANEOUS at 16:13

## 2019-02-07 RX ADMIN — Medication 100 MILLIGRAM(S): at 00:19

## 2019-02-07 RX ADMIN — CHLORHEXIDINE GLUCONATE 1 APPLICATION(S): 213 SOLUTION TOPICAL at 10:17

## 2019-02-07 RX ADMIN — Medication 1 APPLICATION(S): at 16:14

## 2019-02-07 RX ADMIN — SIMVASTATIN 20 MILLIGRAM(S): 20 TABLET, FILM COATED ORAL at 21:14

## 2019-02-07 RX ADMIN — CEFTRIAXONE 100 GRAM(S): 500 INJECTION, POWDER, FOR SOLUTION INTRAMUSCULAR; INTRAVENOUS at 11:03

## 2019-02-07 RX ADMIN — Medication 5.26 MICROGRAM(S)/KG/MIN: at 10:16

## 2019-02-07 RX ADMIN — PROPOFOL 33.66 MICROGRAM(S)/KG/MIN: 10 INJECTION, EMULSION INTRAVENOUS at 20:18

## 2019-02-07 RX ADMIN — Medication 1 APPLICATION(S): at 05:53

## 2019-02-07 RX ADMIN — FENTANYL CITRATE 100 MICROGRAM(S): 50 INJECTION INTRAVENOUS at 00:30

## 2019-02-07 RX ADMIN — MIDAZOLAM HYDROCHLORIDE 4 MILLIGRAM(S): 1 INJECTION, SOLUTION INTRAMUSCULAR; INTRAVENOUS at 02:10

## 2019-02-07 RX ADMIN — HEPARIN SODIUM 7500 UNIT(S): 5000 INJECTION INTRAVENOUS; SUBCUTANEOUS at 21:14

## 2019-02-07 RX ADMIN — FENTANYL CITRATE 5.61 MICROGRAM(S)/KG/HR: 50 INJECTION INTRAVENOUS at 20:19

## 2019-02-07 RX ADMIN — CHLORHEXIDINE GLUCONATE 15 MILLILITER(S): 213 SOLUTION TOPICAL at 05:54

## 2019-02-07 NOTE — PROGRESS NOTE ADULT - SUBJECTIVE AND OBJECTIVE BOX
Follow Up:      Inverval History/ROS:Patient is a 63y old  Female who presents with a chief complaint of SBO, respiratory failure (06 Feb 2019 11:58)    Intubated/ Pressors/ Nitric oxide  Still hypoxic.      Allergies    No Known Allergies    Intolerances    Seroquel (Other)      ANTIMICROBIALS:  cefTRIAXone   IVPB 2 every 24 hours  cefTRIAXone   IVPB        OTHER MEDS:  acetaminophen    Suspension .. 650 milliGRAM(s) Oral every 6 hours PRN  chlorhexidine 0.12% Liquid 15 milliLiter(s) Swish and Spit every 12 hours  chlorhexidine 4% Liquid 1 Application(s) Topical <User Schedule>  cisatracurium Infusion 3 MICROgram(s)/kG/Min IV Continuous <Continuous>  collagenase Ointment 1 Application(s) Topical daily  dextrose 5%. 1000 milliLiter(s) IV Continuous <Continuous>  fentaNYL   Infusion. 0.5 MICROgram(s)/kG/Hr IV Continuous <Continuous>  glucagon  Injectable 1 milliGRAM(s) IntraMuscular once PRN  heparin  Injectable 7500 Unit(s) SubCutaneous every 8 hours  influenza   Vaccine 0.5 milliLiter(s) IntraMuscular once  insulin Infusion 3 Unit(s)/Hr IV Continuous <Continuous>  levothyroxine 137 MICROGram(s) Oral daily  methylPREDNISolone sodium succinate Injectable 40 milliGRAM(s) IV Push daily  norepinephrine Infusion 0.05 MICROgram(s)/kG/Min IV Continuous <Continuous>  petrolatum Ophthalmic Ointment 1 Application(s) Both EYES every 12 hours  propofol Infusion 50 MICROgram(s)/kG/Min IV Continuous <Continuous>  simvastatin 20 milliGRAM(s) Oral at bedtime      Vital Signs Last 24 Hrs  T(C): 37.4 (07 Feb 2019 08:00), Max: 37.4 (07 Feb 2019 08:00)  T(F): 99.4 (07 Feb 2019 08:00), Max: 99.4 (07 Feb 2019 08:00)  HR: 110 (07 Feb 2019 08:00) (92 - 115)  BP: 117/71 (07 Feb 2019 08:00) (97/61 - 142/79)  BP(mean): 83 (07 Feb 2019 08:00) (72 - 101)  RR: 20 (07 Feb 2019 08:00) (19 - 26)  SpO2: 76% (07 Feb 2019 08:00) (74% - 94%)    PHYSICAL EXAM:  General: [x ] intubated  HEAD/EYES: [ ] PERRL [x ] white sclera [ ] icterus  ENT:  [ ] normal [x ] supple [ ] thrush [ ] pharyngeal exudate  Cardiovascular:   [ ] murmur [x ] normal [ ] PPM/AICD  Respiratory:  [x ] clear to ausculation bilaterally  GI:  [ x] soft, non-tender, normal bowel sounds  midline abd pound  :  [ ] iraheta [ ] no CVA tenderness   Musculoskeletal:  [x ] no synovitis  Neurologic:  [ ] non-focal exam   Skin:  [x ] no rash  Lymph: [x ] no lymphadenopathy  Psychiatric:  [ ] appropriate affect [ ] alert & oriented  Lines:  [x ] no phlebitis [ ] central line                                8.1    20.75 )-----------( 448      ( 07 Feb 2019 03:45 )             28.0       02-07    143  |  109<H>  |  17  ----------------------------<  134<H>  4.3   |  22  |  0.82    Ca    8.3<L>      07 Feb 2019 03:45  Phos  3.1     02-07  Mg     2.4     02-07    TPro  6.0  /  Alb  2.5<L>  /  TBili  < 0.2<L>  /  DBili  x   /  AST  25  /  ALT  16  /  AlkPhos  148<H>  02-07          MICROBIOLOGY:Culture - Respiratory:   KLP^Klebsiella pneumoniae  QUANTITY OF GROWTH: MODERATE (02-02-19 @ 11:48)  Culture - Respiratory:   Normal Respiratory Perla Also Present (02-02-19 @ 11:48)      RADIOLOGY:

## 2019-02-07 NOTE — PROGRESS NOTE ADULT - SUBJECTIVE AND OBJECTIVE BOX
CHIEF COMPLAINT:    Interval Events:    REVIEW OF SYSTEMS:  Constitutional: [ ] negative [ ] fevers [ ] chills [ ] weight loss [ ] weight gain  HEENT: [ ] negative [ ] dry eyes [ ] eye irritation [ ] postnasal drip [ ] nasal congestion  CV: [ ] negative  [ ] chest pain [ ] orthopnea [ ] palpitations [ ] murmur  Resp: [ ] negative [ ] cough [ ] shortness of breath [ ] dyspnea [ ] wheezing [ ] sputum [ ] hemoptysis  GI: [ ] negative [ ] nausea [ ] vomiting [ ] diarrhea [ ] constipation [ ] abd pain [ ] dysphagia   : [ ] negative [ ] dysuria [ ] nocturia [ ] hematuria [ ] increased urinary frequency  Musculoskeletal: [ ] negative [ ] back pain [ ] myalgias [ ] arthralgias [ ] fracture  Skin: [ ] negative [ ] rash [ ] itch  Neurological: [ ] negative [ ] headache [ ] dizziness [ ] syncope [ ] weakness [ ] numbness  Psychiatric: [ ] negative [ ] anxiety [ ] depression  Endocrine: [ ] negative [ ] diabetes [ ] thyroid problem  Hematologic/Lymphatic: [ ] negative [ ] anemia [ ] bleeding problem  Allergic/Immunologic: [ ] negative [ ] itchy eyes [ ] nasal discharge [ ] hives [ ] angioedema  [ ] All other systems negative  [ ] Unable to assess ROS because ________    OBJECTIVE:  ICU Vital Signs Last 24 Hrs  T(C): 36.7 (07 Feb 2019 04:41), Max: 36.9 (06 Feb 2019 16:00)  T(F): 98 (07 Feb 2019 04:41), Max: 98.4 (06 Feb 2019 16:00)  HR: 107 (07 Feb 2019 06:00) (92 - 115)  BP: 114/72 (07 Feb 2019 06:00) (97/61 - 142/79)  BP(mean): 83 (07 Feb 2019 06:00) (72 - 101)  ABP: --  ABP(mean): --  RR: 19 (07 Feb 2019 06:00) (18 - 26)  SpO2: 82% (07 Feb 2019 06:00) (74% - 95%)    Mode: AC/ CMV (Assist Control/ Continuous Mandatory Ventilation), RR (machine): 25, TV (machine): 350, FiO2: 100, PEEP: 15, MAP: 22, PIP: 39    02-06 @ 07:01  -  02-07 @ 07:00  --------------------------------------------------------  IN: 3019.5 mL / OUT: 1155 mL / NET: 1864.5 mL      CAPILLARY BLOOD GLUCOSE      POCT Blood Glucose.: 153 mg/dL (07 Feb 2019 00:45)      PHYSICAL EXAM:  GENERAL: NAD, comfortable   EYES: constricted and minimally reactive to light, conjunctiva and sclera clear  CHEST/LUNG: Clear to auscultation bilaterally anteriorly   HEART: Regular rate and rhythm; No murmurs, rubs, or gallops  ABDOMEN: Soft, Nondistended; Bowel sounds present, dressing in place on L side of abdomen w/ retention sutures  EXTREMITIES:  2+ Peripheral Pulses, no appreciable edema  NEUROLOGY: unable to assess due to sedation   SKIN: No rashes or lesions    LINES:    HOSPITAL MEDICATIONS:  Standing Meds:  cefTRIAXone   IVPB 2 Gram(s) IV Intermittent every 24 hours  cefTRIAXone   IVPB      chlorhexidine 0.12% Liquid 15 milliLiter(s) Swish and Spit every 12 hours  chlorhexidine 4% Liquid 1 Application(s) Topical <User Schedule>  collagenase Ointment 1 Application(s) Topical daily  dextrose 5%. 1000 milliLiter(s) IV Continuous <Continuous>  fentaNYL   Infusion. 0.5 MICROgram(s)/kG/Hr IV Continuous <Continuous>  heparin  Injectable 7500 Unit(s) SubCutaneous every 8 hours  influenza   Vaccine 0.5 milliLiter(s) IntraMuscular once  insulin Infusion 3 Unit(s)/Hr IV Continuous <Continuous>  levothyroxine 137 MICROGram(s) Oral daily  methylPREDNISolone sodium succinate Injectable 40 milliGRAM(s) IV Push daily  norepinephrine Infusion 0.05 MICROgram(s)/kG/Min IV Continuous <Continuous>  petrolatum Ophthalmic Ointment 1 Application(s) Both EYES every 12 hours  propofol Infusion 50 MICROgram(s)/kG/Min IV Continuous <Continuous>  simvastatin 20 milliGRAM(s) Oral at bedtime  vancomycin  IVPB 500 milliGRAM(s) IV Intermittent every 12 hours      PRN Meds:  acetaminophen    Suspension .. 650 milliGRAM(s) Oral every 6 hours PRN  glucagon  Injectable 1 milliGRAM(s) IntraMuscular once PRN      LABS:                        8.1    20.75 )-----------( 448      ( 07 Feb 2019 03:45 )             28.0     Hgb Trend: 8.1<--, 8.8<--, 8.8<--, 8.8<--, 9.1<--  02-07    143  |  109<H>  |  17  ----------------------------<  134<H>  4.3   |  22  |  0.82    Ca    8.3<L>      07 Feb 2019 03:45  Phos  3.1     02-07  Mg     2.4     02-07    TPro  6.0  /  Alb  2.5<L>  /  TBili  < 0.2<L>  /  DBili  x   /  AST  25  /  ALT  16  /  AlkPhos  148<H>  02-07    Creatinine Trend: 0.82<--, 0.90<--, 0.90<--, 0.89<--, 0.85<--, 0.70<--  PT/INR - ( 07 Feb 2019 03:45 )   PT: 11.9 SEC;   INR: 1.04          PTT - ( 07 Feb 2019 03:45 )  PTT:39.9 SEC

## 2019-02-07 NOTE — PROGRESS NOTE ADULT - ASSESSMENT
· Assessment		  63 f with DM, COPD, RA on chronic steroids, presented initially with SBO, underwent ex lap 12/15 closed with 4 retention sutures, evicerated on 12/30 and closed with 6 more retention sutures, c/b multifocal pneumonia, developed hypercapnic respiratory failure requiring intubation 1/6, s/p bronch 1/9/18, c/b Enterococci bacteremia, extubated and transferred to floor, still on vanco but developed fever and elevated WBC    new fever and respiratory failure ? etiology.  Fevers now improved.  All cultures negative to date.    enterococcal bacteremia likely intra-abdominal source, cleared on vanco    Problem/Plan - 1:  ·  Problem: Enterococcal bacteremia.  Plan: -suspect abd source  -MATEO negative on 1/23  -Ct abd/pelvis when stable  -repeat bcx negative.   -completed course    Problem/Plan - 2:  ·  Problem: Acute respiratory failure.  Plan: -per MICU.   Sputum cx with klebsiella- Tx for pna    Consider stopping abx     Problem/Plan - 3:  ·  Abd wound:  Continue wound care    Problem/Plan - 4:  · Leukocytosis:  steroids started on 2/4  -Has loose stool, but not copious  If stool output increases, check C diff     Problem5:  Hypoxic respiratory failure  Unclear this is related to infection  No response to abx course    Pregnosis grave.  Call ID service if we ca be of additional assistance

## 2019-02-07 NOTE — PROGRESS NOTE ADULT - ATTENDING COMMENTS
really I mean really sick lady with severe hypoxemia likely mechanical in nature, on vent/very guarded

## 2019-02-07 NOTE — CHART NOTE - NSCHARTNOTEFT_GEN_A_CORE
: Sukhwinder Pineda    INDICATION: Hypoxic respiratory failure and shock     PROCEDURE:  [ X] LIMITED ECHO  [ X] LIMITED CHEST  [ ] LIMITED RETROPERITONEAL  [ ] LIMITED ABDOMINAL  [ ] LIMITED DVT  [ ] NEEDLE GUIDANCE VASCULAR  [ ] NEEDLE GUIDANCE THORACENTESIS  [ ] NEEDLE GUIDANCE PARACENTESIS  [ ] NEEDLE GUIDANCE PERICARDIOCENTESIS  [ ] OTHER    FINDINGS:  LUNG: [place] predominate anteriorly, Bibasilar consolidations with R sided simple pleural effusion     CARDIAC: Hyperdynamic LV with dilated RV, but LV remains greater in size compared to RV, no pericardial effusion     INTERPRETATION:  Bilateral lower lobe atelectasis vs pna causing hypoxic respiratory failure with distributive shock

## 2019-02-07 NOTE — PROGRESS NOTE ADULT - ASSESSMENT
63 year old female with PMHx of HTN, DM, hypothyroidism, RA (prednisone 10mg), pulmonary HTN, CHRIS, and COPD (3L at home) who was originally admitted on 12/15 for SBO s/p ex lap with lysis of adhesions (4 retention sutures) c/b 12/30 she eviscerated 2/2 a coughing episode s/p ex lap (6 retention sutures) c/b multifocal pneumonia, developed hypercapnic respiratory failure requiring intubation 1/6, s/p bronch 1/9/18, and extubated on 1/24, course further c/b Enterococci bacteremia now readmitted to MICU for hypoxic respiratory failure 2/2 atelectasis requiring intubation 01/31    #Neuro  - Sedated   - Continue propofol and fentanyl     #Respiratory   1) Hypoxic Respiratory Failure 2/2 atelectasis requiring intubation 1/31  -Bronchoscopy showing findings concerning for hyperdynamic airway collapse.   - Patient was deconditioned and unable to sit up and participate with PT leading to a functional shunt   - At this time, will continue ventilatory support   - will c/w diuresis w/ 40 IV Lasix  - off nitric oxide since it did not improve oxygenation; oxygenation status continues to fluctuate    #Cardiovascular  1) Cardiogenic shock 2/2 iatrogenic   - Prior bedside US showing LVOT   - On levophed, will wean off as tolerated   - Holding amlodipine 10mg, furosemide 40mg, and metoprolol 25mg BID    - c/w Simvastatin 20mg     #GI  - SBO s/p ex-lap with findings of healthy bowel c/b evisceration of bowel requiring placement of retention sutures  - NPO with tube feeds   - Continue Protonix    #Renal/Metabolic  - Monitor for electrolyte derangements ; replete as needed  - trend BMP for BUN/Cr    #ID - will c/w antibiotics given open wound  Enterococci bacteremia  - Vancomycin D27  - ID team will inform us of duration of abx  - Due to fever of unknown origin, antibiotics were broadened for gram negative coverage- Ceftriaxone D3  - Repeat infectious work up negative: UA, RVP  - C diff negative    #Endocrine  1) Diabetes Mellitus Type 2  - now on insulin gtt for uncontrolled FSG  - Gabapentin 400 TID held for now     2) Hypothyroidism   - Levothyroxine 137     # Rheumatology  1) Rheumatoid arthritis   - c/w Solumedrol 40 IV \    #DVT ppx  - Heparin SQ     # Ethics  GOC discussion ongoing with Palliative Care team. Son (HCP) would like to give the patient time on the ventilatory before making any decisions regarding tracheostomy ; per palliative, daughter will be coming in this week to assist with decision making process

## 2019-02-08 LAB
ALBUMIN SERPL ELPH-MCNC: 2.3 G/DL — LOW (ref 3.3–5)
ALBUMIN SERPL ELPH-MCNC: 2.6 G/DL — LOW (ref 3.3–5)
ALP SERPL-CCNC: 161 U/L — HIGH (ref 40–120)
ALP SERPL-CCNC: 168 U/L — HIGH (ref 40–120)
ALT FLD-CCNC: 13 U/L — SIGNIFICANT CHANGE UP (ref 4–33)
ALT FLD-CCNC: 14 U/L — SIGNIFICANT CHANGE UP (ref 4–33)
ANION GAP SERPL CALC-SCNC: 13 MMO/L — SIGNIFICANT CHANGE UP (ref 7–14)
ANION GAP SERPL CALC-SCNC: 14 MMO/L — SIGNIFICANT CHANGE UP (ref 7–14)
APTT BLD: 44.3 SEC — HIGH (ref 27.5–36.3)
APTT BLD: 50.7 SEC — HIGH (ref 27.5–36.3)
AST SERPL-CCNC: 22 U/L — SIGNIFICANT CHANGE UP (ref 4–32)
AST SERPL-CCNC: 27 U/L — SIGNIFICANT CHANGE UP (ref 4–32)
BASE EXCESS BLDA CALC-SCNC: -2.4 MMOL/L — SIGNIFICANT CHANGE UP
BASE EXCESS BLDA CALC-SCNC: -2.6 MMOL/L — SIGNIFICANT CHANGE UP
BASE EXCESS BLDA CALC-SCNC: -3.5 MMOL/L — SIGNIFICANT CHANGE UP
BASOPHILS # BLD AUTO: 0.14 K/UL — SIGNIFICANT CHANGE UP (ref 0–0.2)
BASOPHILS # BLD AUTO: 0.15 K/UL — SIGNIFICANT CHANGE UP (ref 0–0.2)
BASOPHILS NFR BLD AUTO: 0.7 % — SIGNIFICANT CHANGE UP (ref 0–2)
BASOPHILS NFR BLD AUTO: 0.7 % — SIGNIFICANT CHANGE UP (ref 0–2)
BILIRUB SERPL-MCNC: 0.2 MG/DL — SIGNIFICANT CHANGE UP (ref 0.2–1.2)
BILIRUB SERPL-MCNC: < 0.2 MG/DL — LOW (ref 0.2–1.2)
BLD GP AB SCN SERPL QL: NEGATIVE — SIGNIFICANT CHANGE UP
BUN SERPL-MCNC: 18 MG/DL — SIGNIFICANT CHANGE UP (ref 7–23)
BUN SERPL-MCNC: 18 MG/DL — SIGNIFICANT CHANGE UP (ref 7–23)
CA-I BLDA-SCNC: 1.22 MMOL/L — SIGNIFICANT CHANGE UP (ref 1.15–1.29)
CA-I BLDA-SCNC: 1.22 MMOL/L — SIGNIFICANT CHANGE UP (ref 1.15–1.29)
CA-I BLDA-SCNC: 1.27 MMOL/L — SIGNIFICANT CHANGE UP (ref 1.15–1.29)
CALCIUM SERPL-MCNC: 8.6 MG/DL — SIGNIFICANT CHANGE UP (ref 8.4–10.5)
CALCIUM SERPL-MCNC: 8.6 MG/DL — SIGNIFICANT CHANGE UP (ref 8.4–10.5)
CHLORIDE SERPL-SCNC: 107 MMOL/L — SIGNIFICANT CHANGE UP (ref 98–107)
CHLORIDE SERPL-SCNC: 108 MMOL/L — HIGH (ref 98–107)
CO2 SERPL-SCNC: 22 MMOL/L — SIGNIFICANT CHANGE UP (ref 22–31)
CO2 SERPL-SCNC: 23 MMOL/L — SIGNIFICANT CHANGE UP (ref 22–31)
COHGB MFR BLDV: 1.1 % — SIGNIFICANT CHANGE UP (ref 0.5–1.5)
COHGB MFR BLDV: 7.9 G/DL — LOW (ref 11.5–15.5)
CREAT SERPL-MCNC: 0.77 MG/DL — SIGNIFICANT CHANGE UP (ref 0.5–1.3)
CREAT SERPL-MCNC: 0.77 MG/DL — SIGNIFICANT CHANGE UP (ref 0.5–1.3)
EOSINOPHIL # BLD AUTO: 0.94 K/UL — HIGH (ref 0–0.5)
EOSINOPHIL # BLD AUTO: 0.97 K/UL — HIGH (ref 0–0.5)
EOSINOPHIL NFR BLD AUTO: 4.5 % — SIGNIFICANT CHANGE UP (ref 0–6)
EOSINOPHIL NFR BLD AUTO: 4.6 % — SIGNIFICANT CHANGE UP (ref 0–6)
GLUCOSE BLDA-MCNC: 134 MG/DL — HIGH (ref 70–99)
GLUCOSE BLDA-MCNC: 137 MG/DL — HIGH (ref 70–99)
GLUCOSE BLDA-MCNC: 158 MG/DL — HIGH (ref 70–99)
GLUCOSE BLDC GLUCOMTR-MCNC: 142 MG/DL — HIGH (ref 70–99)
GLUCOSE BLDC GLUCOMTR-MCNC: 142 MG/DL — HIGH (ref 70–99)
GLUCOSE BLDC GLUCOMTR-MCNC: 144 MG/DL — HIGH (ref 70–99)
GLUCOSE BLDC GLUCOMTR-MCNC: 169 MG/DL — HIGH (ref 70–99)
GLUCOSE BLDC GLUCOMTR-MCNC: 180 MG/DL — HIGH (ref 70–99)
GLUCOSE BLDC GLUCOMTR-MCNC: 194 MG/DL — HIGH (ref 70–99)
GLUCOSE BLDC GLUCOMTR-MCNC: 232 MG/DL — HIGH (ref 70–99)
GLUCOSE BLDC GLUCOMTR-MCNC: 241 MG/DL — HIGH (ref 70–99)
GLUCOSE BLDC GLUCOMTR-MCNC: 258 MG/DL — HIGH (ref 70–99)
GLUCOSE BLDC GLUCOMTR-MCNC: 284 MG/DL — HIGH (ref 70–99)
GLUCOSE SERPL-MCNC: 125 MG/DL — HIGH (ref 70–99)
GLUCOSE SERPL-MCNC: 172 MG/DL — HIGH (ref 70–99)
HCO3 BLDA-SCNC: 21 MMOL/L — LOW (ref 22–26)
HCO3 BLDA-SCNC: 22 MMOL/L — SIGNIFICANT CHANGE UP (ref 22–26)
HCO3 BLDA-SCNC: 22 MMOL/L — SIGNIFICANT CHANGE UP (ref 22–26)
HCT VFR BLD CALC: 25.7 % — LOW (ref 34.5–45)
HCT VFR BLD CALC: 26.7 % — LOW (ref 34.5–45)
HCT VFR BLDA CALC: 23.3 % — LOW (ref 34.5–46.5)
HCT VFR BLDA CALC: 23.8 % — LOW (ref 34.5–46.5)
HCT VFR BLDA CALC: 24.7 % — LOW (ref 34.5–46.5)
HGB BLD-MCNC: 7.8 G/DL — LOW (ref 11.5–15.5)
HGB BLD-MCNC: 7.9 G/DL — LOW (ref 11.5–15.5)
HGB BLDA-MCNC: 7.5 G/DL — LOW (ref 11.5–15.5)
HGB BLDA-MCNC: 7.6 G/DL — LOW (ref 11.5–15.5)
HGB BLDA-MCNC: 7.9 G/DL — LOW (ref 11.5–15.5)
IMM GRANULOCYTES NFR BLD AUTO: 7.6 % — HIGH (ref 0–1.5)
IMM GRANULOCYTES NFR BLD AUTO: 8.5 % — HIGH (ref 0–1.5)
INR BLD: 1.07 — SIGNIFICANT CHANGE UP (ref 0.88–1.17)
INR BLD: 1.08 — SIGNIFICANT CHANGE UP (ref 0.88–1.17)
LACTATE BLDA-SCNC: 1.8 MMOL/L — SIGNIFICANT CHANGE UP (ref 0.5–2)
LACTATE BLDA-SCNC: 1.9 MMOL/L — SIGNIFICANT CHANGE UP (ref 0.5–2)
LACTATE BLDA-SCNC: 2 MMOL/L — SIGNIFICANT CHANGE UP (ref 0.5–2)
LYMPHOCYTES # BLD AUTO: 12 % — LOW (ref 13–44)
LYMPHOCYTES # BLD AUTO: 2.07 K/UL — SIGNIFICANT CHANGE UP (ref 1–3.3)
LYMPHOCYTES # BLD AUTO: 2.45 K/UL — SIGNIFICANT CHANGE UP (ref 1–3.3)
LYMPHOCYTES # BLD AUTO: 9.5 % — LOW (ref 13–44)
MAGNESIUM SERPL-MCNC: 2.3 MG/DL — SIGNIFICANT CHANGE UP (ref 1.6–2.6)
MAGNESIUM SERPL-MCNC: 2.3 MG/DL — SIGNIFICANT CHANGE UP (ref 1.6–2.6)
MCHC RBC-ENTMCNC: 29.6 % — LOW (ref 32–36)
MCHC RBC-ENTMCNC: 30.4 % — LOW (ref 32–36)
MCHC RBC-ENTMCNC: 30.9 PG — SIGNIFICANT CHANGE UP (ref 27–34)
MCHC RBC-ENTMCNC: 31.3 PG — SIGNIFICANT CHANGE UP (ref 27–34)
MCV RBC AUTO: 103.2 FL — HIGH (ref 80–100)
MCV RBC AUTO: 104.3 FL — HIGH (ref 80–100)
METHGB MFR BLDV: 1.1 % — SIGNIFICANT CHANGE UP (ref 0–1.5)
MONOCYTES # BLD AUTO: 1.03 K/UL — HIGH (ref 0–0.9)
MONOCYTES # BLD AUTO: 1.09 K/UL — HIGH (ref 0–0.9)
MONOCYTES NFR BLD AUTO: 4.7 % — SIGNIFICANT CHANGE UP (ref 2–14)
MONOCYTES NFR BLD AUTO: 5.3 % — SIGNIFICANT CHANGE UP (ref 2–14)
NEUTROPHILS # BLD AUTO: 14.23 K/UL — HIGH (ref 1.8–7.4)
NEUTROPHILS # BLD AUTO: 15.67 K/UL — HIGH (ref 1.8–7.4)
NEUTROPHILS NFR BLD AUTO: 69.8 % — SIGNIFICANT CHANGE UP (ref 43–77)
NEUTROPHILS NFR BLD AUTO: 72.1 % — SIGNIFICANT CHANGE UP (ref 43–77)
NRBC # FLD: 2.06 K/UL — LOW (ref 25–125)
NRBC # FLD: 2.56 K/UL — LOW (ref 25–125)
NRBC FLD-RTO: 12.5 — SIGNIFICANT CHANGE UP
NRBC FLD-RTO: 9.5 — SIGNIFICANT CHANGE UP
OXYHGB MFR BLDV: 68.7 % — SIGNIFICANT CHANGE UP (ref 59–84)
PCO2 BLDA: 41 MMHG — SIGNIFICANT CHANGE UP (ref 32–48)
PCO2 BLDA: 44 MMHG — SIGNIFICANT CHANGE UP (ref 32–48)
PCO2 BLDA: 47 MMHG — SIGNIFICANT CHANGE UP (ref 32–48)
PH BLDA: 7.31 PH — LOW (ref 7.35–7.45)
PH BLDA: 7.32 PH — LOW (ref 7.35–7.45)
PH BLDA: 7.36 PH — SIGNIFICANT CHANGE UP (ref 7.35–7.45)
PHOSPHATE SERPL-MCNC: 2 MG/DL — LOW (ref 2.5–4.5)
PHOSPHATE SERPL-MCNC: 3.3 MG/DL — SIGNIFICANT CHANGE UP (ref 2.5–4.5)
PLATELET # BLD AUTO: 478 K/UL — HIGH (ref 150–400)
PLATELET # BLD AUTO: 496 K/UL — HIGH (ref 150–400)
PMV BLD: 10 FL — SIGNIFICANT CHANGE UP (ref 7–13)
PMV BLD: 9.9 FL — SIGNIFICANT CHANGE UP (ref 7–13)
PO2 BLDA: 57 MMHG — LOW (ref 83–108)
PO2 BLDA: 60 MMHG — LOW (ref 83–108)
PO2 BLDA: 65 MMHG — LOW (ref 83–108)
POTASSIUM BLDA-SCNC: 3.6 MMOL/L — SIGNIFICANT CHANGE UP (ref 3.4–4.5)
POTASSIUM BLDA-SCNC: 3.8 MMOL/L — SIGNIFICANT CHANGE UP (ref 3.4–4.5)
POTASSIUM BLDA-SCNC: 4 MMOL/L — SIGNIFICANT CHANGE UP (ref 3.4–4.5)
POTASSIUM SERPL-MCNC: 3.7 MMOL/L — SIGNIFICANT CHANGE UP (ref 3.5–5.3)
POTASSIUM SERPL-MCNC: 4.5 MMOL/L — SIGNIFICANT CHANGE UP (ref 3.5–5.3)
POTASSIUM SERPL-SCNC: 3.7 MMOL/L — SIGNIFICANT CHANGE UP (ref 3.5–5.3)
POTASSIUM SERPL-SCNC: 4.5 MMOL/L — SIGNIFICANT CHANGE UP (ref 3.5–5.3)
PROT SERPL-MCNC: 5.9 G/DL — LOW (ref 6–8.3)
PROT SERPL-MCNC: 6.1 G/DL — SIGNIFICANT CHANGE UP (ref 6–8.3)
PROTHROM AB SERPL-ACNC: 11.9 SEC — SIGNIFICANT CHANGE UP (ref 9.8–13.1)
PROTHROM AB SERPL-ACNC: 12 SEC — SIGNIFICANT CHANGE UP (ref 9.8–13.1)
RBC # BLD: 2.49 M/UL — LOW (ref 3.8–5.2)
RBC # BLD: 2.56 M/UL — LOW (ref 3.8–5.2)
RBC # FLD: 19.2 % — HIGH (ref 10.3–14.5)
RBC # FLD: 19.7 % — HIGH (ref 10.3–14.5)
RH IG SCN BLD-IMP: POSITIVE — SIGNIFICANT CHANGE UP
SAO2 % BLDA: 87.8 % — LOW (ref 95–99)
SAO2 % BLDA: 88.4 % — LOW (ref 95–99)
SAO2 % BLDA: 90.7 % — LOW (ref 95–99)
SODIUM BLDA-SCNC: 140 MMOL/L — SIGNIFICANT CHANGE UP (ref 136–146)
SODIUM BLDA-SCNC: 141 MMOL/L — SIGNIFICANT CHANGE UP (ref 136–146)
SODIUM BLDA-SCNC: 143 MMOL/L — SIGNIFICANT CHANGE UP (ref 136–146)
SODIUM SERPL-SCNC: 143 MMOL/L — SIGNIFICANT CHANGE UP (ref 135–145)
SODIUM SERPL-SCNC: 144 MMOL/L — SIGNIFICANT CHANGE UP (ref 135–145)
TROPONIN T, HIGH SENSITIVITY: 19 NG/L — SIGNIFICANT CHANGE UP (ref ?–14)
WBC # BLD: 20.41 K/UL — HIGH (ref 3.8–10.5)
WBC # BLD: 21.73 K/UL — HIGH (ref 3.8–10.5)
WBC # FLD AUTO: 20.41 K/UL — HIGH (ref 3.8–10.5)
WBC # FLD AUTO: 21.73 K/UL — HIGH (ref 3.8–10.5)

## 2019-02-08 PROCEDURE — 99291 CRITICAL CARE FIRST HOUR: CPT

## 2019-02-08 PROCEDURE — 93010 ELECTROCARDIOGRAM REPORT: CPT

## 2019-02-08 RX ORDER — POTASSIUM PHOSPHATE, MONOBASIC POTASSIUM PHOSPHATE, DIBASIC 236; 224 MG/ML; MG/ML
15 INJECTION, SOLUTION INTRAVENOUS ONCE
Qty: 0 | Refills: 0 | Status: COMPLETED | OUTPATIENT
Start: 2019-02-08 | End: 2019-02-08

## 2019-02-08 RX ORDER — CHLORHEXIDINE GLUCONATE 213 G/1000ML
15 SOLUTION TOPICAL EVERY 12 HOURS
Qty: 0 | Refills: 0 | Status: DISCONTINUED | OUTPATIENT
Start: 2019-02-08 | End: 2019-02-27

## 2019-02-08 RX ORDER — FENTANYL CITRATE 50 UG/ML
4 INJECTION INTRAVENOUS
Qty: 2500 | Refills: 0 | Status: DISCONTINUED | OUTPATIENT
Start: 2019-02-08 | End: 2019-02-11

## 2019-02-08 RX ADMIN — HEPARIN SODIUM 7500 UNIT(S): 5000 INJECTION INTRAVENOUS; SUBCUTANEOUS at 05:29

## 2019-02-08 RX ADMIN — CISATRACURIUM BESYLATE 20.2 MICROGRAM(S)/KG/MIN: 2 INJECTION INTRAVENOUS at 19:30

## 2019-02-08 RX ADMIN — CHLORHEXIDINE GLUCONATE 15 MILLILITER(S): 213 SOLUTION TOPICAL at 05:29

## 2019-02-08 RX ADMIN — CHLORHEXIDINE GLUCONATE 1 APPLICATION(S): 213 SOLUTION TOPICAL at 11:15

## 2019-02-08 RX ADMIN — Medication 1 APPLICATION(S): at 05:29

## 2019-02-08 RX ADMIN — Medication 40 MILLIGRAM(S): at 05:29

## 2019-02-08 RX ADMIN — INSULIN HUMAN 7 UNIT(S)/HR: 100 INJECTION, SOLUTION SUBCUTANEOUS at 18:50

## 2019-02-08 RX ADMIN — Medication 1 APPLICATION(S): at 11:15

## 2019-02-08 RX ADMIN — CISATRACURIUM BESYLATE 20.2 MICROGRAM(S)/KG/MIN: 2 INJECTION INTRAVENOUS at 08:20

## 2019-02-08 RX ADMIN — INSULIN HUMAN 4 UNIT(S)/HR: 100 INJECTION, SOLUTION SUBCUTANEOUS at 08:22

## 2019-02-08 RX ADMIN — PROPOFOL 33.66 MICROGRAM(S)/KG/MIN: 10 INJECTION, EMULSION INTRAVENOUS at 19:30

## 2019-02-08 RX ADMIN — CHLORHEXIDINE GLUCONATE 15 MILLILITER(S): 213 SOLUTION TOPICAL at 17:36

## 2019-02-08 RX ADMIN — INSULIN HUMAN 7 UNIT(S)/HR: 100 INJECTION, SOLUTION SUBCUTANEOUS at 19:29

## 2019-02-08 RX ADMIN — PROPOFOL 33.66 MICROGRAM(S)/KG/MIN: 10 INJECTION, EMULSION INTRAVENOUS at 08:20

## 2019-02-08 RX ADMIN — FENTANYL CITRATE 44.88 MICROGRAM(S)/KG/HR: 50 INJECTION INTRAVENOUS at 19:31

## 2019-02-08 RX ADMIN — FENTANYL CITRATE 44.88 MICROGRAM(S)/KG/HR: 50 INJECTION INTRAVENOUS at 08:20

## 2019-02-08 RX ADMIN — HEPARIN SODIUM 7500 UNIT(S): 5000 INJECTION INTRAVENOUS; SUBCUTANEOUS at 14:57

## 2019-02-08 RX ADMIN — Medication 5.26 MICROGRAM(S)/KG/MIN: at 08:20

## 2019-02-08 RX ADMIN — POTASSIUM PHOSPHATE, MONOBASIC POTASSIUM PHOSPHATE, DIBASIC 62.5 MILLIMOLE(S): 236; 224 INJECTION, SOLUTION INTRAVENOUS at 05:29

## 2019-02-08 RX ADMIN — HEPARIN SODIUM 7500 UNIT(S): 5000 INJECTION INTRAVENOUS; SUBCUTANEOUS at 22:00

## 2019-02-08 RX ADMIN — Medication 137 MICROGRAM(S): at 05:29

## 2019-02-08 RX ADMIN — CEFTRIAXONE 100 GRAM(S): 500 INJECTION, POWDER, FOR SOLUTION INTRAMUSCULAR; INTRAVENOUS at 09:57

## 2019-02-08 RX ADMIN — Medication 5.26 MICROGRAM(S)/KG/MIN: at 19:31

## 2019-02-08 RX ADMIN — Medication 1 APPLICATION(S): at 17:36

## 2019-02-08 NOTE — PROGRESS NOTE ADULT - ATTENDING COMMENTS
Agree with above. Critically ill on vent (VDR), nitric oxide and paralyzed. Poor prognosis for functional recovery. Supportive care. Had Long family discussion today with son and aunt and son is in denial about her level of illness.   Total CC time 35 min

## 2019-02-08 NOTE — PROGRESS NOTE ADULT - SUBJECTIVE AND OBJECTIVE BOX
CHIEF COMPLAINT:    Interval Events: still on cisat gtt, remaining in low 90s saturation    REVIEW OF SYSTEMS:  [x ] Unable to assess ROS because intubation/sedation    OBJECTIVE:  ICU Vital Signs Last 24 Hrs  T(C): 37.4 (08 Feb 2019 04:00), Max: 37.5 (07 Feb 2019 19:00)  T(F): 99.3 (08 Feb 2019 04:00), Max: 99.5 (07 Feb 2019 19:00)  HR: 95 (08 Feb 2019 07:00) (88 - 115)  BP: 109/70 (08 Feb 2019 07:00) (86/56 - 138/76)  BP(mean): 84 (08 Feb 2019 07:00) (67 - 103)  RR: 15 (08 Feb 2019 07:00) (14 - 28)  SpO2: 90% (08 Feb 2019 07:00) (76% - 99%)    Mode: standby, RR (machine): 15, PEEP: 15, ITime: 1, MAP: 25, PC: 35, Frequency: 600    02-07 @ 07:01  -  02-08 @ 07:00  --------------------------------------------------------  IN: 3544.9 mL / OUT: 915 mL / NET: 2629.9 mL    CAPILLARY BLOOD GLUCOSE    POCT Blood Glucose.: 142 mg/dL (08 Feb 2019 05:13)    PHYSICAL EXAM:  GENERAL: intubated/sedated  EYES: constricted and minimally reactive to light, conjunctiva and sclera clear  CHEST/LUNG: Clear to auscultation bilaterally anteriorly   HEART: Regular rate and rhythm; No murmurs, rubs, or gallops  ABDOMEN: Soft, Nondistended; Bowel sounds present, dressing in place on L side of abdomen w/ retention sutures, clean and in tact   EXTREMITIES:  pulses difficult to appreciate, anasarca   NEUROLOGY: unable to assess due to sedation   SKIN: No rashes or lesions    LINES:    HOSPITAL MEDICATIONS:  Standing Meds:  cefTRIAXone   IVPB 2 Gram(s) IV Intermittent every 24 hours  cefTRIAXone   IVPB      chlorhexidine 0.12% Liquid 15 milliLiter(s) Swish and Spit every 12 hours  chlorhexidine 4% Liquid 1 Application(s) Topical <User Schedule>  cisatracurium Infusion 3 MICROgram(s)/kG/Min IV Continuous <Continuous>  collagenase Ointment 1 Application(s) Topical daily  dextrose 5%. 1000 milliLiter(s) IV Continuous <Continuous>  fentaNYL   Infusion. 4 MICROgram(s)/kG/Hr IV Continuous <Continuous>  heparin  Injectable 7500 Unit(s) SubCutaneous every 8 hours  influenza   Vaccine 0.5 milliLiter(s) IntraMuscular once  insulin Infusion 4 Unit(s)/Hr IV Continuous <Continuous>  levothyroxine 137 MICROGram(s) Oral daily  methylPREDNISolone sodium succinate Injectable 40 milliGRAM(s) IV Push daily  norepinephrine Infusion 0.05 MICROgram(s)/kG/Min IV Continuous <Continuous>  petrolatum Ophthalmic Ointment 1 Application(s) Both EYES every 12 hours  propofol Infusion 50 MICROgram(s)/kG/Min IV Continuous <Continuous>  simvastatin 20 milliGRAM(s) Oral at bedtime      PRN Meds:  acetaminophen    Suspension .. 650 milliGRAM(s) Oral every 6 hours PRN  glucagon  Injectable 1 milliGRAM(s) IntraMuscular once PRN      LABS:                        7.8    20.41 )-----------( 478      ( 08 Feb 2019 03:00 )             25.7     Hgb Trend: 7.8<--, 8.1<--, 8.8<--, 8.8<--, 8.8<--  02-08    143  |  107  |  18  ----------------------------<  125<H>  3.7   |  22  |  0.77    Ca    8.6      08 Feb 2019 03:00  Phos  2.0     02-08  Mg     2.3     02-08    TPro  5.9<L>  /  Alb  2.3<L>  /  TBili  < 0.2<L>  /  DBili  x   /  AST  22  /  ALT  14  /  AlkPhos  161<H>  02-08    Creatinine Trend: 0.77<--, 0.82<--, 0.90<--, 0.90<--, 0.89<--, 0.85<--  PT/INR - ( 08 Feb 2019 03:00 )   PT: 11.9 SEC;   INR: 1.07          PTT - ( 08 Feb 2019 03:00 )  PTT:44.3 SEC    Arterial Blood Gas:  02-08 @ 04:40  7.36/41/60/22/88.4/-2.4  ABG lactate: 1.9  Arterial Blood Gas:  02-08 @ 00:06  7.31/47/57/22/87.8/-2.6  ABG lactate: 2.0  Arterial Blood Gas:  02-07 @ 21:54  7.26/53/61/22/88.3/-2.9  ABG lactate: 1.7  Arterial Blood Gas:  02-07 @ 14:15  7.29/49/83/22/96.4/-2.7  ABG lactate: 2.0  Arterial Blood Gas:  02-07 @ 10:51  7.25/53/100/21/98.2/-4.2  ABG lactate: 1.5

## 2019-02-08 NOTE — CHART NOTE - NSCHARTNOTEFT_GEN_A_CORE
A little after 1 PM, Dr. Coronado and I had a discussion with the son Norris and his aunt, the pt's sister.  The pt spent 10-15 minutes perseverating on the necessity of calling him regarding "everything" that happens with and to his mother.  He still wants "everything done to save her."  He said that "he's never yelled" in reference to me, Dr. Grijalva, but that he did "go off on the nurse yesterday."  Norris said that everything is now "in God's hands," and that "he's grateful for everything we've done."  He closed the conversation with "God's working a miracle for her."  Unfortunately, Norris is in denial about his mother's ultimate prognosis and does not realize that she is dependent upon nitric oxide, which is not sustainable in the long run.

## 2019-02-08 NOTE — PROGRESS NOTE ADULT - ASSESSMENT
63 year old female with PMHx of HTN, DM, hypothyroidism, RA (prednisone 10mg), pulmonary HTN, CHRIS, and COPD (3L at home) who was originally admitted on 12/15 for SBO s/p ex lap with lysis of adhesions (4 retention sutures) c/b 12/30 she eviscerated 2/2 a coughing episode s/p ex lap (6 retention sutures) c/b multifocal pneumonia, developed hypercapnic respiratory failure requiring intubation 1/6, s/p bronch 1/9/18, and extubated on 1/24, course further c/b Enterococci bacteremia now readmitted to MICU for hypoxic respiratory failure 2/2 atelectasis requiring intubation 01/31    #Neuro  - Sedated   - Continue propofol and fentanyl     #Respiratory   1) Hypoxic Respiratory Failure 2/2 atelectasis requiring intubation 1/31 - now on VDR   -Bronchoscopy showing findings concerning for hyperdynamic airway collapse.   - Patient was deconditioned and unable to sit up and participate with PT leading to a functional shunt   - At this time, will continue ventilatory support   - will diurese PRN  - off nitric oxide since it did not improve oxygenation; oxygenation status continues to fluctuate    #Cardiovascular  1) Cardiogenic shock 2/2 iatrogenic   - Prior bedside US showing LVOT   - On levophed, will wean off as tolerated   - Holding amlodipine 10mg, furosemide 40mg, and metoprolol 25mg BID    - c/w Simvastatin 20mg     #GI  - SBO s/p ex-lap with findings of healthy bowel c/b evisceration of bowel requiring placement of retention sutures  - NPO with tube feeds   - Continue Protonix    #Renal/Metabolic  - Monitor for electrolyte derangements ; replete as needed  - trend BMP for BUN/Cr    #ID - will c/w antibiotics given open wound  Enterococci bacteremia  - Vancomycin D26 - discontinued yesterday   - ID team will inform us of duration of abx  - Due to fever of unknown origin, antibiotics were broadened for gram negative coverage- Ceftriaxone D3 - will DC today  - Repeat infectious work up negative: UA, RVP  - C diff negative    #Endocrine  1) Diabetes Mellitus Type 2  - now on insulin gtt for uncontrolled FSG  - Gabapentin 400 TID held for now     2) Hypothyroidism   - Levothyroxine 137     # Rheumatology  1) Rheumatoid arthritis   - c/w Solumedrol 40 IV     #DVT ppx  - Heparin SQ     # Ethics  GOC discussion ongoing with Palliative Care team. Son (HCP) would like to give the patient time on the ventilatory before making any decisions regarding tracheostomy ; per palliative, daughter will be coming in this week to assist with decision making process

## 2019-02-08 NOTE — CHART NOTE - NSCHARTNOTEFT_GEN_A_CORE
Spoke to pt's son Mr. Dillon Rush around 11:40 AM.  Updated Dillon on his mother's condition; he was upset that she was switched to VDR w/o a phone call and was angry about paralysis; the rationale for paralysis was explained to him, as was the switch to VDR.  Per Dillon, he wants "everything done to keep her alive."  He did not make a decision about VDR, but stated that he will be here later today around 1 PM.  It was explained to Dillon in layman's terms that she is now dependent upon the nitric oxide because her oxygen levels drop when she's off of it.  He also wants to know what can be done to "open up her collapsed lung on the left."  Dillon agreed to have a discussion with everyone regarding her care, but he said that he's glad that her "numbers looked better" based on a conversation that he had with Dr. Hurt, but he doesn't seem to grasp how dependent she is on the NO or the ultimate long-term prognosis and was emphatic that his brothers are not to be called for anything.

## 2019-02-08 NOTE — CHART NOTE - NSCHARTNOTEFT_GEN_A_CORE
Source:  nursing and EMR     Diet : NPO with tube feed - Glucerna 1.2 @ 40 ml/hr 24hrs daily with No Carb Pro source 2/d      Patient intubated, , noted 3+ generalized edema , abdominal wound present , per nursing pt. tolerating EN .     Enteral : EN provides 1152 kcal & 58 gm protein/d       Current Weight: - 107.9 kg on 2/8/19; 98.2 kg on 1/30/19; Admit wt. - 112.2 kg ; IBW - 47.7 kg ; wt. increase reflective of edema     Pertinent Medications: MEDICATIONS  (STANDING):  chlorhexidine 0.12% Liquid 15 milliLiter(s) Swish and Spit every 12 hours  chlorhexidine 4% Liquid 1 Application(s) Topical <User Schedule>  cisatracurium Infusion 3 MICROgram(s)/kG/Min (20.196 mL/Hr) IV Continuous <Continuous>  collagenase Ointment 1 Application(s) Topical daily  dextrose 5%. 1000 milliLiter(s) (50 mL/Hr) IV Continuous <Continuous>  fentaNYL   Infusion. 4 MICROgram(s)/kG/Hr (44.88 mL/Hr) IV Continuous <Continuous>  heparin  Injectable 7500 Unit(s) SubCutaneous every 8 hours  influenza   Vaccine 0.5 milliLiter(s) IntraMuscular once  insulin Infusion 5 Unit(s)/Hr (5 mL/Hr) IV Continuous <Continuous>  levothyroxine 137 MICROGram(s) Oral daily  methylPREDNISolone sodium succinate Injectable 40 milliGRAM(s) IV Push daily  norepinephrine Infusion 0.05 MICROgram(s)/kG/Min (5.259 mL/Hr) IV Continuous <Continuous>  petrolatum Ophthalmic Ointment 1 Application(s) Both EYES every 12 hours  propofol Infusion 50 MICROgram(s)/kG/Min (33.66 mL/Hr) IV Continuous <Continuous>  simvastatin 20 milliGRAM(s) Oral at bedtime    MEDICATIONS  (PRN):  acetaminophen    Suspension .. 650 milliGRAM(s) Oral every 6 hours PRN Temp greater or equal to 38C (100.4F), Mild Pain (1 - 3), Moderate Pain (4 - 6)  glucagon  Injectable 1 milliGRAM(s) IntraMuscular once PRN Glucose LESS THAN 70 milligrams/deciliter    Pertinent Labs:  02-08 Na144 mmol/L Glu 172 mg/dL<H> K+ 4.5 mmol/L Cr  0.77 mg/dL BUN 18 mg/dL 02-08 Phos 3.3 mg/dL 02-08 Alb 2.6 g/dL<L> 01-17 PAB 27 mg/dL      Recommend - Glucerna 1.2 @ 45 ml/hr 24hrs with No Carb Pro source 2 packs daily if consistent with aggressive nutrition support . Team made aware .    Monitoring and Evaluation:  Tolerance to diet prescription , weights , follow up per protocol

## 2019-02-09 LAB
ALBUMIN SERPL ELPH-MCNC: 2.2 G/DL — LOW (ref 3.3–5)
ALP SERPL-CCNC: 154 U/L — HIGH (ref 40–120)
ALT FLD-CCNC: 16 U/L — SIGNIFICANT CHANGE UP (ref 4–33)
ANION GAP SERPL CALC-SCNC: 13 MMO/L — SIGNIFICANT CHANGE UP (ref 7–14)
APTT BLD: 40.1 SEC — HIGH (ref 27.5–36.3)
AST SERPL-CCNC: 25 U/L — SIGNIFICANT CHANGE UP (ref 4–32)
BASE EXCESS BLDA CALC-SCNC: -2.5 MMOL/L — SIGNIFICANT CHANGE UP
BASOPHILS # BLD AUTO: 0.12 K/UL — SIGNIFICANT CHANGE UP (ref 0–0.2)
BASOPHILS NFR BLD AUTO: 0.6 % — SIGNIFICANT CHANGE UP (ref 0–2)
BILIRUB SERPL-MCNC: < 0.2 MG/DL — LOW (ref 0.2–1.2)
BUN SERPL-MCNC: 22 MG/DL — SIGNIFICANT CHANGE UP (ref 7–23)
CA-I BLDA-SCNC: 1.21 MMOL/L — SIGNIFICANT CHANGE UP (ref 1.15–1.29)
CALCIUM SERPL-MCNC: 8.4 MG/DL — SIGNIFICANT CHANGE UP (ref 8.4–10.5)
CHLORIDE SERPL-SCNC: 107 MMOL/L — SIGNIFICANT CHANGE UP (ref 98–107)
CO2 SERPL-SCNC: 21 MMOL/L — LOW (ref 22–31)
CREAT SERPL-MCNC: 0.8 MG/DL — SIGNIFICANT CHANGE UP (ref 0.5–1.3)
EOSINOPHIL # BLD AUTO: 0.57 K/UL — HIGH (ref 0–0.5)
EOSINOPHIL NFR BLD AUTO: 3 % — SIGNIFICANT CHANGE UP (ref 0–6)
FERRITIN SERPL-MCNC: 176.2 NG/ML — HIGH (ref 15–150)
GLUCOSE BLDA-MCNC: 144 MG/DL — HIGH (ref 70–99)
GLUCOSE BLDC GLUCOMTR-MCNC: 114 MG/DL — HIGH (ref 70–99)
GLUCOSE BLDC GLUCOMTR-MCNC: 126 MG/DL — HIGH (ref 70–99)
GLUCOSE BLDC GLUCOMTR-MCNC: 155 MG/DL — HIGH (ref 70–99)
GLUCOSE BLDC GLUCOMTR-MCNC: 170 MG/DL — HIGH (ref 70–99)
GLUCOSE BLDC GLUCOMTR-MCNC: 181 MG/DL — HIGH (ref 70–99)
GLUCOSE BLDC GLUCOMTR-MCNC: 205 MG/DL — HIGH (ref 70–99)
GLUCOSE BLDC GLUCOMTR-MCNC: 227 MG/DL — HIGH (ref 70–99)
GLUCOSE BLDC GLUCOMTR-MCNC: 234 MG/DL — HIGH (ref 70–99)
GLUCOSE BLDC GLUCOMTR-MCNC: 251 MG/DL — HIGH (ref 70–99)
GLUCOSE BLDC GLUCOMTR-MCNC: 269 MG/DL — HIGH (ref 70–99)
GLUCOSE BLDC GLUCOMTR-MCNC: 269 MG/DL — HIGH (ref 70–99)
GLUCOSE SERPL-MCNC: 122 MG/DL — HIGH (ref 70–99)
HCO3 BLDA-SCNC: 22 MMOL/L — SIGNIFICANT CHANGE UP (ref 22–26)
HCT VFR BLD CALC: 23.7 % — LOW (ref 34.5–45)
HCT VFR BLD CALC: 23.8 % — LOW (ref 34.5–45)
HCT VFR BLDA CALC: 23 % — LOW (ref 34.5–46.5)
HGB BLD-MCNC: 7 G/DL — CRITICAL LOW (ref 11.5–15.5)
HGB BLD-MCNC: 7.1 G/DL — LOW (ref 11.5–15.5)
HGB BLDA-MCNC: 7.4 G/DL — LOW (ref 11.5–15.5)
IMM GRANULOCYTES NFR BLD AUTO: 8.5 % — HIGH (ref 0–1.5)
INR BLD: 1.08 — SIGNIFICANT CHANGE UP (ref 0.88–1.17)
IRON SATN MFR SERPL: 219 UG/DL — SIGNIFICANT CHANGE UP (ref 140–530)
IRON SATN MFR SERPL: 33 UG/DL — SIGNIFICANT CHANGE UP (ref 30–160)
LACTATE BLDA-SCNC: 1.4 MMOL/L — SIGNIFICANT CHANGE UP (ref 0.5–2)
LYMPHOCYTES # BLD AUTO: 11.7 % — LOW (ref 13–44)
LYMPHOCYTES # BLD AUTO: 2.2 K/UL — SIGNIFICANT CHANGE UP (ref 1–3.3)
MAGNESIUM SERPL-MCNC: 2.4 MG/DL — SIGNIFICANT CHANGE UP (ref 1.6–2.6)
MCHC RBC-ENTMCNC: 29.5 % — LOW (ref 32–36)
MCHC RBC-ENTMCNC: 29.8 % — LOW (ref 32–36)
MCHC RBC-ENTMCNC: 30.4 PG — SIGNIFICANT CHANGE UP (ref 27–34)
MCHC RBC-ENTMCNC: 30.7 PG — SIGNIFICANT CHANGE UP (ref 27–34)
MCV RBC AUTO: 103 FL — HIGH (ref 80–100)
MCV RBC AUTO: 103 FL — HIGH (ref 80–100)
MONOCYTES # BLD AUTO: 1.13 K/UL — HIGH (ref 0–0.9)
MONOCYTES NFR BLD AUTO: 6 % — SIGNIFICANT CHANGE UP (ref 2–14)
NEUTROPHILS # BLD AUTO: 13.13 K/UL — HIGH (ref 1.8–7.4)
NEUTROPHILS NFR BLD AUTO: 70.2 % — SIGNIFICANT CHANGE UP (ref 43–77)
NRBC # FLD: 1.62 K/UL — LOW (ref 25–125)
NRBC # FLD: 2.84 K/UL — LOW (ref 25–125)
NRBC FLD-RTO: 15.1 — SIGNIFICANT CHANGE UP
NRBC FLD-RTO: 9.6 — SIGNIFICANT CHANGE UP
OB PNL STL: NEGATIVE — SIGNIFICANT CHANGE UP
PCO2 BLDA: 34 MMHG — SIGNIFICANT CHANGE UP (ref 32–48)
PH BLDA: 7.41 PH — SIGNIFICANT CHANGE UP (ref 7.35–7.45)
PHOSPHATE SERPL-MCNC: 3.2 MG/DL — SIGNIFICANT CHANGE UP (ref 2.5–4.5)
PLATELET # BLD AUTO: 482 K/UL — HIGH (ref 150–400)
PLATELET # BLD AUTO: 490 K/UL — HIGH (ref 150–400)
PMV BLD: 10.3 FL — SIGNIFICANT CHANGE UP (ref 7–13)
PMV BLD: 10.6 FL — SIGNIFICANT CHANGE UP (ref 7–13)
PO2 BLDA: 59 MMHG — LOW (ref 83–108)
POTASSIUM BLDA-SCNC: 3.6 MMOL/L — SIGNIFICANT CHANGE UP (ref 3.4–4.5)
POTASSIUM SERPL-MCNC: 4.2 MMOL/L — SIGNIFICANT CHANGE UP (ref 3.5–5.3)
POTASSIUM SERPL-SCNC: 4.2 MMOL/L — SIGNIFICANT CHANGE UP (ref 3.5–5.3)
PROT SERPL-MCNC: 5.5 G/DL — LOW (ref 6–8.3)
PROTHROM AB SERPL-ACNC: 12.3 SEC — SIGNIFICANT CHANGE UP (ref 9.8–13.1)
RBC # BLD: 2.3 M/UL — LOW (ref 3.8–5.2)
RBC # BLD: 2.31 M/UL — LOW (ref 3.8–5.2)
RBC # FLD: 19.8 % — HIGH (ref 10.3–14.5)
RBC # FLD: 19.9 % — HIGH (ref 10.3–14.5)
RETICS #: 81 K/UL — SIGNIFICANT CHANGE UP (ref 25–125)
RETICS/RBC NFR: 3.7 % — HIGH (ref 0.5–2.5)
SAO2 % BLDA: 90.4 % — LOW (ref 95–99)
SODIUM BLDA-SCNC: 140 MMOL/L — SIGNIFICANT CHANGE UP (ref 136–146)
SODIUM SERPL-SCNC: 141 MMOL/L — SIGNIFICANT CHANGE UP (ref 135–145)
UIBC SERPL-MCNC: 186.1 UG/DL — SIGNIFICANT CHANGE UP (ref 110–370)
WBC # BLD: 16.91 K/UL — HIGH (ref 3.8–10.5)
WBC # BLD: 18.75 K/UL — HIGH (ref 3.8–10.5)
WBC # FLD AUTO: 16.91 K/UL — HIGH (ref 3.8–10.5)
WBC # FLD AUTO: 18.75 K/UL — HIGH (ref 3.8–10.5)

## 2019-02-09 PROCEDURE — 93308 TTE F-UP OR LMTD: CPT | Mod: 26

## 2019-02-09 PROCEDURE — 76604 US EXAM CHEST: CPT | Mod: 26

## 2019-02-09 PROCEDURE — 99291 CRITICAL CARE FIRST HOUR: CPT

## 2019-02-09 RX ORDER — INSULIN HUMAN 100 [IU]/ML
6 INJECTION, SOLUTION SUBCUTANEOUS
Qty: 100 | Refills: 0 | Status: DISCONTINUED | OUTPATIENT
Start: 2019-02-09 | End: 2019-02-10

## 2019-02-09 RX ORDER — FUROSEMIDE 40 MG
40 TABLET ORAL ONCE
Qty: 0 | Refills: 0 | Status: COMPLETED | OUTPATIENT
Start: 2019-02-09 | End: 2019-02-10

## 2019-02-09 RX ORDER — FUROSEMIDE 40 MG
40 TABLET ORAL ONCE
Qty: 0 | Refills: 0 | Status: COMPLETED | OUTPATIENT
Start: 2019-02-09 | End: 2019-02-09

## 2019-02-09 RX ADMIN — Medication 1 APPLICATION(S): at 12:05

## 2019-02-09 RX ADMIN — FENTANYL CITRATE 44.88 MICROGRAM(S)/KG/HR: 50 INJECTION INTRAVENOUS at 20:48

## 2019-02-09 RX ADMIN — SIMVASTATIN 20 MILLIGRAM(S): 20 TABLET, FILM COATED ORAL at 21:41

## 2019-02-09 RX ADMIN — CHLORHEXIDINE GLUCONATE 15 MILLILITER(S): 213 SOLUTION TOPICAL at 17:14

## 2019-02-09 RX ADMIN — CHLORHEXIDINE GLUCONATE 1 APPLICATION(S): 213 SOLUTION TOPICAL at 12:05

## 2019-02-09 RX ADMIN — PROPOFOL 33.66 MICROGRAM(S)/KG/MIN: 10 INJECTION, EMULSION INTRAVENOUS at 20:47

## 2019-02-09 RX ADMIN — Medication 137 MICROGRAM(S): at 05:06

## 2019-02-09 RX ADMIN — Medication 5.26 MICROGRAM(S)/KG/MIN: at 20:48

## 2019-02-09 RX ADMIN — CISATRACURIUM BESYLATE 20.2 MICROGRAM(S)/KG/MIN: 2 INJECTION INTRAVENOUS at 08:44

## 2019-02-09 RX ADMIN — Medication 40 MILLIGRAM(S): at 12:05

## 2019-02-09 RX ADMIN — Medication 5.26 MICROGRAM(S)/KG/MIN: at 08:44

## 2019-02-09 RX ADMIN — CHLORHEXIDINE GLUCONATE 15 MILLILITER(S): 213 SOLUTION TOPICAL at 05:06

## 2019-02-09 RX ADMIN — FENTANYL CITRATE 44.88 MICROGRAM(S)/KG/HR: 50 INJECTION INTRAVENOUS at 08:45

## 2019-02-09 RX ADMIN — INSULIN HUMAN 6 UNIT(S)/HR: 100 INJECTION, SOLUTION SUBCUTANEOUS at 20:46

## 2019-02-09 RX ADMIN — CISATRACURIUM BESYLATE 20.2 MICROGRAM(S)/KG/MIN: 2 INJECTION INTRAVENOUS at 20:46

## 2019-02-09 RX ADMIN — Medication 1 APPLICATION(S): at 17:14

## 2019-02-09 RX ADMIN — Medication 1 APPLICATION(S): at 05:06

## 2019-02-09 RX ADMIN — Medication 40 MILLIGRAM(S): at 20:44

## 2019-02-09 RX ADMIN — PROPOFOL 33.66 MICROGRAM(S)/KG/MIN: 10 INJECTION, EMULSION INTRAVENOUS at 08:45

## 2019-02-09 RX ADMIN — Medication 40 MILLIGRAM(S): at 05:06

## 2019-02-09 RX ADMIN — SIMVASTATIN 20 MILLIGRAM(S): 20 TABLET, FILM COATED ORAL at 00:42

## 2019-02-09 RX ADMIN — INSULIN HUMAN 4 UNIT(S)/HR: 100 INJECTION, SOLUTION SUBCUTANEOUS at 08:43

## 2019-02-09 NOTE — PROGRESS NOTE ADULT - SUBJECTIVE AND OBJECTIVE BOX
INTERVAL HPI/OVERNIGHT EVENTS: Hgb down to 7.0 from 7.9. Holding heparin.     SUBJECTIVE: Patient seen and examined at bedside.     OBJECTIVE:    VITAL SIGNS:  ICU Vital Signs Last 24 Hrs  T(C): 36.7 (09 Feb 2019 04:00), Max: 37.3 (08 Feb 2019 08:00)  T(F): 98 (09 Feb 2019 04:00), Max: 99.1 (08 Feb 2019 08:00)  HR: 86 (09 Feb 2019 07:00) (84 - 102)  BP: 129/83 (09 Feb 2019 07:00) (83/62 - 133/88)  BP(mean): 97 (09 Feb 2019 07:00) (66 - 101)  ABP: --  ABP(mean): --  RR: 15 (09 Feb 2019 07:00) (15 - 21)  SpO2: 94% (09 Feb 2019 07:00) (91% - 98%)        02-08 @ 07:01  -  02-09 @ 07:00  --------------------------------------------------------  IN: 3551.2 mL / OUT: 1105 mL / NET: 2446.2 mL      CAPILLARY BLOOD GLUCOSE      POCT Blood Glucose.: 155 mg/dL (09 Feb 2019 06:35)      PHYSICAL EXAM:    General: NAD  HEENT: NC/AT; PERRL, clear conjunctiva  Neck: supple  Respiratory: CTA b/l  Cardiovascular: +S1/S2; RRR  Abdomen: soft, NT/ND; +BS x4  Extremities: WWP, 2+ peripheral pulses b/l; no LE edema  Skin: normal color and turgor; no rash  Neurological:    MEDICATIONS:  MEDICATIONS  (STANDING):  chlorhexidine 0.12% Liquid 15 milliLiter(s) Swish and Spit every 12 hours  chlorhexidine 4% Liquid 1 Application(s) Topical <User Schedule>  cisatracurium Infusion 3 MICROgram(s)/kG/Min (20.196 mL/Hr) IV Continuous <Continuous>  collagenase Ointment 1 Application(s) Topical daily  dextrose 5%. 1000 milliLiter(s) (50 mL/Hr) IV Continuous <Continuous>  fentaNYL   Infusion. 4 MICROgram(s)/kG/Hr (44.88 mL/Hr) IV Continuous <Continuous>  influenza   Vaccine 0.5 milliLiter(s) IntraMuscular once  insulin Infusion 4 Unit(s)/Hr (4 mL/Hr) IV Continuous <Continuous>  levothyroxine 137 MICROGram(s) Oral daily  methylPREDNISolone sodium succinate Injectable 40 milliGRAM(s) IV Push daily  norepinephrine Infusion 0.05 MICROgram(s)/kG/Min (5.259 mL/Hr) IV Continuous <Continuous>  petrolatum Ophthalmic Ointment 1 Application(s) Both EYES every 12 hours  propofol Infusion 50 MICROgram(s)/kG/Min (33.66 mL/Hr) IV Continuous <Continuous>  simvastatin 20 milliGRAM(s) Oral at bedtime    MEDICATIONS  (PRN):  acetaminophen    Suspension .. 650 milliGRAM(s) Oral every 6 hours PRN Temp greater or equal to 38C (100.4F), Mild Pain (1 - 3), Moderate Pain (4 - 6)  glucagon  Injectable 1 milliGRAM(s) IntraMuscular once PRN Glucose LESS THAN 70 milligrams/deciliter      ALLERGIES:  Allergies    No Known Allergies    Intolerances    Seroquel (Other)      LABS:                        7.0    18.75 )-----------( 482      ( 09 Feb 2019 02:15 )             23.7     Hemoglobin: 7.0 g/dL (02-09 @ 02:15)  Hemoglobin: 7.9 g/dL (02-08 @ 09:20)  Hemoglobin: 7.8 g/dL (02-08 @ 03:00)  Hemoglobin: 8.1 g/dL (02-07 @ 03:45)  Hemoglobin: 8.8 g/dL (02-06 @ 03:40)    CBC Full  -  ( 09 Feb 2019 02:15 )  WBC Count : 18.75 K/uL  Hemoglobin : 7.0 g/dL  Hematocrit : 23.7 %  Platelet Count - Automated : 482 K/uL  Mean Cell Volume : 103.0 fL  Mean Cell Hemoglobin : 30.4 pg  Mean Cell Hemoglobin Concentration : 29.5 %  Auto Neutrophil # : 13.13 K/uL  Auto Lymphocyte # : 2.20 K/uL  Auto Monocyte # : 1.13 K/uL  Auto Eosinophil # : 0.57 K/uL  Auto Basophil # : 0.12 K/uL  Auto Neutrophil % : 70.2 %  Auto Lymphocyte % : 11.7 %  Auto Monocyte % : 6.0 %  Auto Eosinophil % : 3.0 %  Auto Basophil % : 0.6 %    02-09    141  |  107  |  22  ----------------------------<  122<H>  4.2   |  21<L>  |  0.80    Ca    8.4      09 Feb 2019 02:15  Phos  3.2     02-09  Mg     2.4     02-09    TPro  5.5<L>  /  Alb  2.2<L>  /  TBili  < 0.2<L>  /  DBili  x   /  AST  25  /  ALT  16  /  AlkPhos  154<H>  02-09    Creatinine Trend: 0.80<--, 0.77<--, 0.77<--, 0.82<--, 0.90<--, 0.90<--  LIVER FUNCTIONS - ( 09 Feb 2019 02:15 )  Alb: 2.2 g/dL / Pro: 5.5 g/dL / ALK PHOS: 154 u/L / ALT: 16 u/L / AST: 25 u/L / GGT: x           PT/INR - ( 09 Feb 2019 02:15 )   PT: 12.3 SEC;   INR: 1.08          PTT - ( 09 Feb 2019 02:15 )  PTT:40.1 SEC      ABG - ( 09 Feb 2019 04:30 )  pH, Arterial: 7.41  pH, Blood: x     /  pCO2: 34    /  pO2: 59    / HCO3: 22    / Base Excess: -2.5  /  SaO2: 90.4                04:30 - ABG - pH: 7.41  |  pCO2: 34    |  pO2: 59    | Lactate: 1.4   | BE: -2.5   23:09 - ABG - pH: 7.32  |  pCO2: 44    |  pO2: 65    | Lactate: 1.8   | BE: -3.5           EKG:   MICROBIOLOGY:    IMAGING:    RADIOLOGY & ADDITIONAL TESTS: Reviewed. INTERVAL HPI/OVERNIGHT EVENTS: Hgb down to 7.0 from 7.9. Holding heparin.     SUBJECTIVE: Patient seen and examined at bedside.     OBJECTIVE:    VITAL SIGNS:  ICU Vital Signs Last 24 Hrs  T(C): 36.7 (09 Feb 2019 04:00), Max: 37.3 (08 Feb 2019 08:00)  T(F): 98 (09 Feb 2019 04:00), Max: 99.1 (08 Feb 2019 08:00)  HR: 86 (09 Feb 2019 07:00) (84 - 102)  BP: 129/83 (09 Feb 2019 07:00) (83/62 - 133/88)  BP(mean): 97 (09 Feb 2019 07:00) (66 - 101)  ABP: --  ABP(mean): --  RR: 15 (09 Feb 2019 07:00) (15 - 21)  SpO2: 94% (09 Feb 2019 07:00) (91% - 98%)        02-08 @ 07:01  -  02-09 @ 07:00  --------------------------------------------------------  IN: 3551.2 mL / OUT: 1105 mL / NET: 2446.2 mL      CAPILLARY BLOOD GLUCOSE      POCT Blood Glucose.: 155 mg/dL (09 Feb 2019 06:35)      PHYSICAL EXAM:    General: NAD  HEENT: NC/AT; PERRL, clear conjunctiva  Neck: supple  Respiratory: CTA b/l  Cardiovascular: +S1/S2; RRR  Abdomen: soft, NT/ND; +BS x4  Extremities: WWP, 2+ peripheral pulses b/l; no LE edema  Skin: normal color and turgor; no rash  Neurological:    MEDICATIONS:  MEDICATIONS  (STANDING):  chlorhexidine 0.12% Liquid 15 milliLiter(s) Swish and Spit every 12 hours  chlorhexidine 4% Liquid 1 Application(s) Topical <User Schedule>  cisatracurium Infusion 3 MICROgram(s)/kG/Min (20.196 mL/Hr) IV Continuous <Continuous>  collagenase Ointment 1 Application(s) Topical daily  dextrose 5%. 1000 milliLiter(s) (50 mL/Hr) IV Continuous <Continuous>  fentaNYL   Infusion. 4 MICROgram(s)/kG/Hr (44.88 mL/Hr) IV Continuous <Continuous>  influenza   Vaccine 0.5 milliLiter(s) IntraMuscular once  insulin Infusion 4 Unit(s)/Hr (4 mL/Hr) IV Continuous <Continuous>  levothyroxine 137 MICROGram(s) Oral daily  methylPREDNISolone sodium succinate Injectable 40 milliGRAM(s) IV Push daily  norepinephrine Infusion 0.05 MICROgram(s)/kG/Min (5.259 mL/Hr) IV Continuous <Continuous>  petrolatum Ophthalmic Ointment 1 Application(s) Both EYES every 12 hours  propofol Infusion 50 MICROgram(s)/kG/Min (33.66 mL/Hr) IV Continuous <Continuous>  simvastatin 20 milliGRAM(s) Oral at bedtime    MEDICATIONS  (PRN):  acetaminophen    Suspension .. 650 milliGRAM(s) Oral every 6 hours PRN Temp greater or equal to 38C (100.4F), Mild Pain (1 - 3), Moderate Pain (4 - 6)  glucagon  Injectable 1 milliGRAM(s) IntraMuscular once PRN Glucose LESS THAN 70 milligrams/deciliter      ALLERGIES:  Allergies    No Known Allergies    Intolerances    Seroquel (Other)      LABS:                        7.0    18.75 )-----------( 482      ( 09 Feb 2019 02:15 )             23.7     Hemoglobin: 7.0 g/dL (02-09 @ 02:15)  Hemoglobin: 7.9 g/dL (02-08 @ 09:20)  Hemoglobin: 7.8 g/dL (02-08 @ 03:00)  Hemoglobin: 8.1 g/dL (02-07 @ 03:45)  Hemoglobin: 8.8 g/dL (02-06 @ 03:40)    CBC Full  -  ( 09 Feb 2019 02:15 )  WBC Count : 18.75 K/uL  Hemoglobin : 7.0 g/dL  Hematocrit : 23.7 %  Platelet Count - Automated : 482 K/uL  Mean Cell Volume : 103.0 fL  Mean Cell Hemoglobin : 30.4 pg  Mean Cell Hemoglobin Concentration : 29.5 %  Auto Neutrophil # : 13.13 K/uL  Auto Lymphocyte # : 2.20 K/uL  Auto Monocyte # : 1.13 K/uL  Auto Eosinophil # : 0.57 K/uL  Auto Basophil # : 0.12 K/uL  Auto Neutrophil % : 70.2 %  Auto Lymphocyte % : 11.7 %  Auto Monocyte % : 6.0 %  Auto Eosinophil % : 3.0 %  Auto Basophil % : 0.6 %    02-09    141  |  107  |  22  ----------------------------<  122<H>  4.2   |  21<L>  |  0.80    Ca    8.4      09 Feb 2019 02:15  Phos  3.2     02-09  Mg     2.4     02-09    TPro  5.5<L>  /  Alb  2.2<L>  /  TBili  < 0.2<L>  /  DBili  x   /  AST  25  /  ALT  16  /  AlkPhos  154<H>  02-09    Creatinine Trend: 0.80<--, 0.77<--, 0.77<--, 0.82<--, 0.90<--, 0.90<--  LIVER FUNCTIONS - ( 09 Feb 2019 02:15 )  Alb: 2.2 g/dL / Pro: 5.5 g/dL / ALK PHOS: 154 u/L / ALT: 16 u/L / AST: 25 u/L / GGT: x           PT/INR - ( 09 Feb 2019 02:15 )   PT: 12.3 SEC;   INR: 1.08          PTT - ( 09 Feb 2019 02:15 )  PTT:40.1 SEC      ABG - ( 09 Feb 2019 04:30 )  pH, Arterial: 7.41  pH, Blood: x     /  pCO2: 34    /  pO2: 59    / HCO3: 22    / Base Excess: -2.5  /  SaO2: 90.4                04:30 - ABG - pH: 7.41  |  pCO2: 34    |  pO2: 59    | Lactate: 1.4   | BE: -2.5   23:09 - ABG - pH: 7.32  |  pCO2: 44    |  pO2: 65    | Lactate: 1.8   | BE: -3.5             RADIOLOGY & ADDITIONAL TESTS: Reviewed. INTERVAL HPI/OVERNIGHT EVENTS: Hgb down to 7.0 from 7.9. Holding heparin. Repeat CBC up to 7.1 without intervention.     SUBJECTIVE: Patient seen and examined at bedside.     OBJECTIVE:    VITAL SIGNS:  ICU Vital Signs Last 24 Hrs  T(C): 36.7 (09 Feb 2019 04:00), Max: 37.3 (08 Feb 2019 08:00)  T(F): 98 (09 Feb 2019 04:00), Max: 99.1 (08 Feb 2019 08:00)  HR: 86 (09 Feb 2019 07:00) (84 - 102)  BP: 129/83 (09 Feb 2019 07:00) (83/62 - 133/88)  BP(mean): 97 (09 Feb 2019 07:00) (66 - 101)  ABP: --  ABP(mean): --  RR: 15 (09 Feb 2019 07:00) (15 - 21)  SpO2: 94% (09 Feb 2019 07:00) (91% - 98%)        02-08 @ 07:01  -  02-09 @ 07:00  --------------------------------------------------------  IN: 3551.2 mL / OUT: 1105 mL / NET: 2446.2 mL      CAPILLARY BLOOD GLUCOSE      POCT Blood Glucose.: 155 mg/dL (09 Feb 2019 06:35)      PHYSICAL EXAM:    General: NAD  HEENT: NC/AT; PERRL, clear conjunctiva  Neck: supple  Respiratory: CTA b/l  Cardiovascular: +S1/S2; RRR  Abdomen: soft, NT/ND; +BS x4  Extremities: WWP, 2+ peripheral pulses b/l; no LE edema  Skin: normal color and turgor; no rash  Neurological:    MEDICATIONS:  MEDICATIONS  (STANDING):  chlorhexidine 0.12% Liquid 15 milliLiter(s) Swish and Spit every 12 hours  chlorhexidine 4% Liquid 1 Application(s) Topical <User Schedule>  cisatracurium Infusion 3 MICROgram(s)/kG/Min (20.196 mL/Hr) IV Continuous <Continuous>  collagenase Ointment 1 Application(s) Topical daily  dextrose 5%. 1000 milliLiter(s) (50 mL/Hr) IV Continuous <Continuous>  fentaNYL   Infusion. 4 MICROgram(s)/kG/Hr (44.88 mL/Hr) IV Continuous <Continuous>  influenza   Vaccine 0.5 milliLiter(s) IntraMuscular once  insulin Infusion 4 Unit(s)/Hr (4 mL/Hr) IV Continuous <Continuous>  levothyroxine 137 MICROGram(s) Oral daily  methylPREDNISolone sodium succinate Injectable 40 milliGRAM(s) IV Push daily  norepinephrine Infusion 0.05 MICROgram(s)/kG/Min (5.259 mL/Hr) IV Continuous <Continuous>  petrolatum Ophthalmic Ointment 1 Application(s) Both EYES every 12 hours  propofol Infusion 50 MICROgram(s)/kG/Min (33.66 mL/Hr) IV Continuous <Continuous>  simvastatin 20 milliGRAM(s) Oral at bedtime    MEDICATIONS  (PRN):  acetaminophen    Suspension .. 650 milliGRAM(s) Oral every 6 hours PRN Temp greater or equal to 38C (100.4F), Mild Pain (1 - 3), Moderate Pain (4 - 6)  glucagon  Injectable 1 milliGRAM(s) IntraMuscular once PRN Glucose LESS THAN 70 milligrams/deciliter      ALLERGIES:  Allergies    No Known Allergies    Intolerances    Seroquel (Other)      LABS:                        7.0    18.75 )-----------( 482      ( 09 Feb 2019 02:15 )             23.7     Hemoglobin: 7.0 g/dL (02-09 @ 02:15)  Hemoglobin: 7.9 g/dL (02-08 @ 09:20)  Hemoglobin: 7.8 g/dL (02-08 @ 03:00)  Hemoglobin: 8.1 g/dL (02-07 @ 03:45)  Hemoglobin: 8.8 g/dL (02-06 @ 03:40)    CBC Full  -  ( 09 Feb 2019 02:15 )  WBC Count : 18.75 K/uL  Hemoglobin : 7.0 g/dL  Hematocrit : 23.7 %  Platelet Count - Automated : 482 K/uL  Mean Cell Volume : 103.0 fL  Mean Cell Hemoglobin : 30.4 pg  Mean Cell Hemoglobin Concentration : 29.5 %  Auto Neutrophil # : 13.13 K/uL  Auto Lymphocyte # : 2.20 K/uL  Auto Monocyte # : 1.13 K/uL  Auto Eosinophil # : 0.57 K/uL  Auto Basophil # : 0.12 K/uL  Auto Neutrophil % : 70.2 %  Auto Lymphocyte % : 11.7 %  Auto Monocyte % : 6.0 %  Auto Eosinophil % : 3.0 %  Auto Basophil % : 0.6 %    02-09    141  |  107  |  22  ----------------------------<  122<H>  4.2   |  21<L>  |  0.80    Ca    8.4      09 Feb 2019 02:15  Phos  3.2     02-09  Mg     2.4     02-09    TPro  5.5<L>  /  Alb  2.2<L>  /  TBili  < 0.2<L>  /  DBili  x   /  AST  25  /  ALT  16  /  AlkPhos  154<H>  02-09    Creatinine Trend: 0.80<--, 0.77<--, 0.77<--, 0.82<--, 0.90<--, 0.90<--  LIVER FUNCTIONS - ( 09 Feb 2019 02:15 )  Alb: 2.2 g/dL / Pro: 5.5 g/dL / ALK PHOS: 154 u/L / ALT: 16 u/L / AST: 25 u/L / GGT: x           PT/INR - ( 09 Feb 2019 02:15 )   PT: 12.3 SEC;   INR: 1.08          PTT - ( 09 Feb 2019 02:15 )  PTT:40.1 SEC      ABG - ( 09 Feb 2019 04:30 )  pH, Arterial: 7.41  pH, Blood: x     /  pCO2: 34    /  pO2: 59    / HCO3: 22    / Base Excess: -2.5  /  SaO2: 90.4                04:30 - ABG - pH: 7.41  |  pCO2: 34    |  pO2: 59    | Lactate: 1.4   | BE: -2.5   23:09 - ABG - pH: 7.32  |  pCO2: 44    |  pO2: 65    | Lactate: 1.8   | BE: -3.5             RADIOLOGY & ADDITIONAL TESTS: Reviewed.

## 2019-02-09 NOTE — PROGRESS NOTE ADULT - ATTENDING COMMENTS
Critically ill female with hypoxemic respiratory failure on VDR vent.  Titrate off NO.  Wean FiO2 as tolerated.   Overall prognosis is guarded.    Critical care time: 45 minutes

## 2019-02-09 NOTE — PROGRESS NOTE ADULT - ASSESSMENT
63 year old female with PMHx of HTN, DM, hypothyroidism, RA (prednisone 10mg), pulmonary HTN, CHRIS, and COPD (3L at home) who was originally admitted on 12/15 for SBO s/p ex lap with lysis of adhesions (4 retention sutures) c/b 12/30 she eviscerated 2/2 a coughing episode s/p ex lap (6 retention sutures) c/b multifocal pneumonia, developed hypercapnic respiratory failure requiring intubation 1/6, s/p bronch 1/9/18, and extubated on 1/24, course further c/b Enterococci bacteremia now readmitted to MICU for hypoxic respiratory failure 2/2 atelectasis requiring intubation 01/31    #Neuro  - Sedated   - Continue propofol and fentanyl     #Respiratory   1) Hypoxic Respiratory Failure 2/2 atelectasis requiring intubation 1/31 - now on VDR   -Bronchoscopy showing findings concerning for hyperdynamic airway collapse.   - Patient was deconditioned and unable to sit up and participate with PT leading to a functional shunt   - At this time, will continue ventilatory support   - will diurese PRN  - off nitric oxide since it did not improve oxygenation; oxygenation status continues to fluctuate    #Cardiovascular  1) Cardiogenic shock 2/2 iatrogenic   - Prior bedside US showing LVOT   - On levophed, will wean off as tolerated   - Holding amlodipine 10mg, furosemide 40mg, and metoprolol 25mg BID    - c/w Simvastatin 20mg     #GI  - SBO s/p ex-lap with findings of healthy bowel c/b evisceration of bowel requiring placement of retention sutures  - NPO with tube feeds   - Continue Protonix    #Renal/Metabolic  - Monitor for electrolyte derangements ; replete as needed  - trend BMP for BUN/Cr    #ID - will c/w antibiotics given open wound  Enterococci bacteremia  - Vancomycin D26 - discontinued yesterday   - ID team will inform us of duration of abx  - Due to fever of unknown origin, antibiotics were broadened for gram negative coverage- Ceftriaxone D3 - will DC today  - Repeat infectious work up negative: UA, RVP  - C diff negative    #Endocrine  1) Diabetes Mellitus Type 2  - now on insulin gtt for uncontrolled FSG  - Gabapentin 400 TID held for now     2) Hypothyroidism   - Levothyroxine 137     # Rheumatology  1) Rheumatoid arthritis   - c/w Solumedrol 40 IV     #DVT ppx  - Heparin SQ     # Ethics  GOC discussion ongoing with Palliative Care team. Son (HCP) would like to give the patient time on the ventilatory before making any decisions regarding tracheostomy ; per palliative, daughter will be coming in this week to assist with decision making process 63 year old female with PMHx of HTN, DM, hypothyroidism, RA (prednisone 10mg), pulmonary HTN, CHRIS, and COPD (3L at home) who was originally admitted on 12/15 for SBO s/p ex lap with lysis of adhesions (4 retention sutures) c/b 12/30 she eviscerated 2/2 a coughing episode s/p ex lap (6 retention sutures) c/b multifocal pneumonia, developed hypercapnic respiratory failure requiring intubation 1/6. Transferred to MICU 1/22. Extubated 1/24 and transferred to RCU. Reintubated and transferred back to MICU on 2/2. Now has remained intubated since 2/2 for severe atelectasis/dynamic airway collapse.        #Neuro  - Sedated   - Continue propofol and fentanyl + Nimbex    #Respiratory   1) Hypoxic Respiratory Failure 2/2 atelectasis requiring intubation 1/31 - now on VDR   -Bronchoscopy showing findings concerning for hyperdynamic airway collapse.   - Patient was deconditioned and unable to sit up and participate with PT leading to a functional shunt   - At this time, will continue ventilatory support   - Giving lasix, weaning off NO, coming down on Fi02 as tolerated     #Cardiovascular  1) Cardiogenic shock 2/2 iatrogenic   - Prior bedside US showing LVOT    - Holding amlodipine 10mg, furosemide 40mg, and metoprolol 25mg BID    - c/w Simvastatin 20mg   - Turned off levo today, continue to monitor     #GI  - SBO s/p ex-lap with findings of healthy bowel c/b evisceration of bowel requiring placement of retention sutures  - NPO with tube feeds   - Continue Protonix    #Renal/Metabolic  - Monitor for electrolyte derangements ; replete as needed  - trend BMP for BUN/Cr    #ID - will c/w antibiotics given open wound  Enterococci bacteremia  - Vancomycin D26 - discontinued yesterday   - ID team will inform us of duration of abx  - Due to fever of unknown origin, antibiotics were broadened for gram negative coverage- Ceftriaxone D3 - will DC today  - Repeat infectious work up negative: UA, RVP  - C diff negative  - Continue to monitor     #Endocrine  1) Diabetes Mellitus Type 2  - now on insulin gtt for uncontrolled FSG  - Gabapentin 400 TID held for now   - Insulin drip increased to 5 U/hr    2) Hypothyroidism   - Levothyroxine 137     # Rheumatology  1) Rheumatoid arthritis   - c/w Solumedrol 40 IV     #DVT ppx  - Heparin SQ     # Ethics  GOC discussion ongoing with Palliative Care team. Son (HCP) would like to give the patient time on the ventilatory before making any decisions regarding tracheostomy ; per palliative, daughter will be coming in this week to assist with decision making process 63 year old female with PMHx of HTN, DM, hypothyroidism, RA (prednisone 10mg), pulmonary HTN, CHRIS, and COPD (3L at home) who was originally admitted on 12/15 for SBO s/p ex lap with lysis of adhesions (4 retention sutures) c/b 12/30 she eviscerated 2/2 a coughing episode s/p ex lap (6 retention sutures) c/b multifocal pneumonia, developed hypercapnic respiratory failure requiring intubation 1/6. Transferred to MICU 1/22. Extubated 1/24 and transferred to RCU. Reintubated and transferred back to MICU on 2/2. Now has remained intubated since 2/2 for severe atelectasis/dynamic airway collapse.        #Neuro  - Sedated   - Continue propofol and fentanyl + Nimbex    #Respiratory   1) Hypoxic Respiratory Failure 2/2 atelectasis requiring intubation 1/31 - now on VDR   -Bronchoscopy showing findings concerning for hyperdynamic airway collapse.   - Patient was deconditioned and unable to sit up and participate with PT leading to a functional shunt   - At this time, will continue ventilatory support   - Giving lasix, weaning off NO, coming down on Fi02 as tolerated     #Cardiovascular  1) Cardiogenic shock 2/2 iatrogenic   - Prior bedside US showing LVOT    - Holding amlodipine 10mg, furosemide 40mg, and metoprolol 25mg BID    - c/w Simvastatin 20mg   - Turned off levo today, continue to monitor     #GI  - SBO s/p ex-lap with findings of healthy bowel c/b evisceration of bowel requiring placement of retention sutures  - NPO with tube feeds   - Continue Protonix    #Renal/Metabolic  - Monitor for electrolyte derangements ; replete as needed  - trend BMP for BUN/Cr    #ID - will c/w antibiotics given open wound  Enterococci bacteremia  - Vancomycin D26 - discontinued yesterday   - ID team will inform us of duration of abx  - Due to fever of unknown origin, antibiotics were broadened for gram negative coverage- Ceftriaxone D3 - will DC today  - Repeat infectious work up negative: UA, RVP  - C diff negative  - Continue to monitor     #Endocrine  1) Diabetes Mellitus Type 2  - now on insulin gtt for uncontrolled FSG  - Gabapentin 400 TID held for now   - Insulin drip increased to 5 U/hr    2) Hypothyroidism   - Levothyroxine 137     # Rheumatology  1) Rheumatoid arthritis   - c/w Solumedrol 40 IV     #Heme  - Hgb down to 7 from 7.9 yesterday. Repeat CBC shows hgb up to 7.1 without intervention. Will send hemolysis labs and continue to monitor    #DVT ppx  - Heparin SQ     # Ethics  GOC discussion ongoing with Palliative Care team. Son (HCP) would like to give the patient time on the ventilatory before making any decisions regarding tracheostomy ; per palliative, daughter will be coming in this week to assist with decision making process

## 2019-02-09 NOTE — CHART NOTE - NSCHARTNOTEFT_GEN_A_CORE
:  Patricio Camacho    INDICATION:  Respiratory failure    PROCEDURE:  [x ] LIMITED ECHO  [x ] LIMITED CHEST    FINDINGS:  Bilateral B-line pattern  Small left pleural effusion    RV and LV systolic function  No pericardial effusion    INTERPRETATION:  BIlateral pulmonary edema, normal systolic cardiac function.

## 2019-02-10 LAB
ANION GAP SERPL CALC-SCNC: 14 MMO/L — SIGNIFICANT CHANGE UP (ref 7–14)
ANION GAP SERPL CALC-SCNC: 17 MMO/L — HIGH (ref 7–14)
BASE EXCESS BLDA CALC-SCNC: -0.5 MMOL/L — SIGNIFICANT CHANGE UP
BASE EXCESS BLDA CALC-SCNC: -0.8 MMOL/L — SIGNIFICANT CHANGE UP
BASE EXCESS BLDA CALC-SCNC: -1.6 MMOL/L — SIGNIFICANT CHANGE UP
BUN SERPL-MCNC: 27 MG/DL — HIGH (ref 7–23)
BUN SERPL-MCNC: 30 MG/DL — HIGH (ref 7–23)
CA-I BLDA-SCNC: 1.19 MMOL/L — SIGNIFICANT CHANGE UP (ref 1.15–1.29)
CA-I BLDA-SCNC: 1.21 MMOL/L — SIGNIFICANT CHANGE UP (ref 1.15–1.29)
CALCIUM SERPL-MCNC: 8.6 MG/DL — SIGNIFICANT CHANGE UP (ref 8.4–10.5)
CALCIUM SERPL-MCNC: 8.9 MG/DL — SIGNIFICANT CHANGE UP (ref 8.4–10.5)
CHLORIDE BLDA-SCNC: 113 MMOL/L — HIGH (ref 96–108)
CHLORIDE SERPL-SCNC: 102 MMOL/L — SIGNIFICANT CHANGE UP (ref 98–107)
CHLORIDE SERPL-SCNC: 107 MMOL/L — SIGNIFICANT CHANGE UP (ref 98–107)
CO2 SERPL-SCNC: 22 MMOL/L — SIGNIFICANT CHANGE UP (ref 22–31)
CO2 SERPL-SCNC: 23 MMOL/L — SIGNIFICANT CHANGE UP (ref 22–31)
CREAT SERPL-MCNC: 0.74 MG/DL — SIGNIFICANT CHANGE UP (ref 0.5–1.3)
CREAT SERPL-MCNC: 0.75 MG/DL — SIGNIFICANT CHANGE UP (ref 0.5–1.3)
GLUCOSE BLDA-MCNC: 113 MG/DL — HIGH (ref 70–99)
GLUCOSE BLDA-MCNC: 153 MG/DL — HIGH (ref 70–99)
GLUCOSE BLDA-MCNC: 198 MG/DL — HIGH (ref 70–99)
GLUCOSE BLDC GLUCOMTR-MCNC: 110 MG/DL — HIGH (ref 70–99)
GLUCOSE BLDC GLUCOMTR-MCNC: 111 MG/DL — HIGH (ref 70–99)
GLUCOSE BLDC GLUCOMTR-MCNC: 130 MG/DL — HIGH (ref 70–99)
GLUCOSE BLDC GLUCOMTR-MCNC: 131 MG/DL — HIGH (ref 70–99)
GLUCOSE BLDC GLUCOMTR-MCNC: 135 MG/DL — HIGH (ref 70–99)
GLUCOSE BLDC GLUCOMTR-MCNC: 166 MG/DL — HIGH (ref 70–99)
GLUCOSE BLDC GLUCOMTR-MCNC: 190 MG/DL — HIGH (ref 70–99)
GLUCOSE BLDC GLUCOMTR-MCNC: 244 MG/DL — HIGH (ref 70–99)
GLUCOSE SERPL-MCNC: 120 MG/DL — HIGH (ref 70–99)
GLUCOSE SERPL-MCNC: 278 MG/DL — HIGH (ref 70–99)
HCO3 BLDA-SCNC: 23 MMOL/L — SIGNIFICANT CHANGE UP (ref 22–26)
HCO3 BLDA-SCNC: 23 MMOL/L — SIGNIFICANT CHANGE UP (ref 22–26)
HCO3 BLDA-SCNC: 24 MMOL/L — SIGNIFICANT CHANGE UP (ref 22–26)
HCT VFR BLD CALC: 24.7 % — LOW (ref 34.5–45)
HCT VFR BLDA CALC: 22.8 % — LOW (ref 34.5–46.5)
HCT VFR BLDA CALC: 24.2 % — LOW (ref 34.5–46.5)
HCT VFR BLDA CALC: 30.9 % — LOW (ref 34.5–46.5)
HGB BLD-MCNC: 7.2 G/DL — LOW (ref 11.5–15.5)
HGB BLDA-MCNC: 10 G/DL — LOW (ref 11.5–15.5)
HGB BLDA-MCNC: 7.3 G/DL — LOW (ref 11.5–15.5)
HGB BLDA-MCNC: 7.8 G/DL — LOW (ref 11.5–15.5)
LACTATE BLDA-SCNC: 1.8 MMOL/L — SIGNIFICANT CHANGE UP (ref 0.5–2)
LACTATE BLDA-SCNC: 2.2 MMOL/L — HIGH (ref 0.5–2)
LACTATE BLDA-SCNC: 2.3 MMOL/L — HIGH (ref 0.5–2)
MAGNESIUM SERPL-MCNC: 2.2 MG/DL — SIGNIFICANT CHANGE UP (ref 1.6–2.6)
MCHC RBC-ENTMCNC: 29.1 % — LOW (ref 32–36)
MCHC RBC-ENTMCNC: 30.3 PG — SIGNIFICANT CHANGE UP (ref 27–34)
MCV RBC AUTO: 103.8 FL — HIGH (ref 80–100)
NRBC # FLD: 2.74 K/UL — LOW (ref 25–125)
NRBC FLD-RTO: 15.7 — SIGNIFICANT CHANGE UP
PCO2 BLDA: 38 MMHG — SIGNIFICANT CHANGE UP (ref 32–48)
PCO2 BLDA: 40 MMHG — SIGNIFICANT CHANGE UP (ref 32–48)
PCO2 BLDA: 54 MMHG — HIGH (ref 32–48)
PH BLDA: 7.29 PH — LOW (ref 7.35–7.45)
PH BLDA: 7.39 PH — SIGNIFICANT CHANGE UP (ref 7.35–7.45)
PH BLDA: 7.39 PH — SIGNIFICANT CHANGE UP (ref 7.35–7.45)
PHOSPHATE SERPL-MCNC: 3.3 MG/DL — SIGNIFICANT CHANGE UP (ref 2.5–4.5)
PLATELET # BLD AUTO: 509 K/UL — HIGH (ref 150–400)
PMV BLD: 10.3 FL — SIGNIFICANT CHANGE UP (ref 7–13)
PO2 BLDA: 31 MMHG — CRITICAL LOW (ref 83–108)
PO2 BLDA: 55 MMHG — LOW (ref 83–108)
PO2 BLDA: 64 MMHG — LOW (ref 83–108)
POTASSIUM BLDA-SCNC: 3.4 MMOL/L — SIGNIFICANT CHANGE UP (ref 3.4–4.5)
POTASSIUM BLDA-SCNC: 3.6 MMOL/L — SIGNIFICANT CHANGE UP (ref 3.4–4.5)
POTASSIUM BLDA-SCNC: 4 MMOL/L — SIGNIFICANT CHANGE UP (ref 3.4–4.5)
POTASSIUM SERPL-MCNC: 3.8 MMOL/L — SIGNIFICANT CHANGE UP (ref 3.5–5.3)
POTASSIUM SERPL-MCNC: 4.4 MMOL/L — SIGNIFICANT CHANGE UP (ref 3.5–5.3)
POTASSIUM SERPL-SCNC: 3.8 MMOL/L — SIGNIFICANT CHANGE UP (ref 3.5–5.3)
POTASSIUM SERPL-SCNC: 4.4 MMOL/L — SIGNIFICANT CHANGE UP (ref 3.5–5.3)
RBC # BLD: 2.38 M/UL — LOW (ref 3.8–5.2)
RBC # FLD: 20.2 % — HIGH (ref 10.3–14.5)
SAO2 % BLDA: 46.2 % — LOW (ref 95–99)
SAO2 % BLDA: 86.9 % — LOW (ref 95–99)
SAO2 % BLDA: 90.8 % — LOW (ref 95–99)
SODIUM BLDA-SCNC: 141 MMOL/L — SIGNIFICANT CHANGE UP (ref 136–146)
SODIUM BLDA-SCNC: 142 MMOL/L — SIGNIFICANT CHANGE UP (ref 136–146)
SODIUM BLDA-SCNC: 142 MMOL/L — SIGNIFICANT CHANGE UP (ref 136–146)
SODIUM SERPL-SCNC: 142 MMOL/L — SIGNIFICANT CHANGE UP (ref 135–145)
SODIUM SERPL-SCNC: 143 MMOL/L — SIGNIFICANT CHANGE UP (ref 135–145)
WBC # BLD: 17.5 K/UL — HIGH (ref 3.8–10.5)
WBC # FLD AUTO: 17.5 K/UL — HIGH (ref 3.8–10.5)

## 2019-02-10 PROCEDURE — 71045 X-RAY EXAM CHEST 1 VIEW: CPT | Mod: 26,77

## 2019-02-10 PROCEDURE — 99291 CRITICAL CARE FIRST HOUR: CPT

## 2019-02-10 PROCEDURE — 71045 X-RAY EXAM CHEST 1 VIEW: CPT | Mod: 26

## 2019-02-10 RX ORDER — INSULIN LISPRO 100/ML
VIAL (ML) SUBCUTANEOUS EVERY 6 HOURS
Qty: 0 | Refills: 0 | Status: DISCONTINUED | OUTPATIENT
Start: 2019-02-10 | End: 2019-02-28

## 2019-02-10 RX ORDER — PANTOPRAZOLE SODIUM 20 MG/1
40 TABLET, DELAYED RELEASE ORAL DAILY
Qty: 0 | Refills: 0 | Status: DISCONTINUED | OUTPATIENT
Start: 2019-02-10 | End: 2019-02-27

## 2019-02-10 RX ORDER — DEXTROSE 50 % IN WATER 50 %
12.5 SYRINGE (ML) INTRAVENOUS ONCE
Qty: 0 | Refills: 0 | Status: DISCONTINUED | OUTPATIENT
Start: 2019-02-10 | End: 2019-02-27

## 2019-02-10 RX ORDER — FUROSEMIDE 40 MG
80 TABLET ORAL ONCE
Qty: 0 | Refills: 0 | Status: DISCONTINUED | OUTPATIENT
Start: 2019-02-10 | End: 2019-02-10

## 2019-02-10 RX ORDER — HUMAN INSULIN 100 [IU]/ML
20 INJECTION, SUSPENSION SUBCUTANEOUS EVERY 6 HOURS
Qty: 0 | Refills: 0 | Status: DISCONTINUED | OUTPATIENT
Start: 2019-02-10 | End: 2019-02-10

## 2019-02-10 RX ORDER — GLUCAGON INJECTION, SOLUTION 0.5 MG/.1ML
1 INJECTION, SOLUTION SUBCUTANEOUS ONCE
Qty: 0 | Refills: 0 | Status: DISCONTINUED | OUTPATIENT
Start: 2019-02-10 | End: 2019-02-27

## 2019-02-10 RX ORDER — HEPARIN SODIUM 5000 [USP'U]/ML
7500 INJECTION INTRAVENOUS; SUBCUTANEOUS EVERY 8 HOURS
Qty: 0 | Refills: 0 | Status: DISCONTINUED | OUTPATIENT
Start: 2019-02-10 | End: 2019-03-20

## 2019-02-10 RX ORDER — LEVOTHYROXINE SODIUM 125 MCG
137 TABLET ORAL DAILY
Qty: 0 | Refills: 0 | Status: DISCONTINUED | OUTPATIENT
Start: 2019-02-10 | End: 2019-02-14

## 2019-02-10 RX ORDER — FUROSEMIDE 40 MG
40 TABLET ORAL EVERY 8 HOURS
Qty: 0 | Refills: 0 | Status: DISCONTINUED | OUTPATIENT
Start: 2019-02-10 | End: 2019-02-10

## 2019-02-10 RX ORDER — SODIUM CHLORIDE 9 MG/ML
1000 INJECTION, SOLUTION INTRAVENOUS
Qty: 0 | Refills: 0 | Status: DISCONTINUED | OUTPATIENT
Start: 2019-02-10 | End: 2019-02-27

## 2019-02-10 RX ORDER — DEXTROSE 50 % IN WATER 50 %
15 SYRINGE (ML) INTRAVENOUS ONCE
Qty: 0 | Refills: 0 | Status: DISCONTINUED | OUTPATIENT
Start: 2019-02-10 | End: 2019-02-27

## 2019-02-10 RX ORDER — DEXTROSE 50 % IN WATER 50 %
25 SYRINGE (ML) INTRAVENOUS ONCE
Qty: 0 | Refills: 0 | Status: DISCONTINUED | OUTPATIENT
Start: 2019-02-10 | End: 2019-02-27

## 2019-02-10 RX ORDER — FUROSEMIDE 40 MG
40 TABLET ORAL EVERY 6 HOURS
Qty: 0 | Refills: 0 | Status: DISCONTINUED | OUTPATIENT
Start: 2019-02-10 | End: 2019-02-11

## 2019-02-10 RX ADMIN — CISATRACURIUM BESYLATE 20.2 MICROGRAM(S)/KG/MIN: 2 INJECTION INTRAVENOUS at 19:03

## 2019-02-10 RX ADMIN — Medication 1 APPLICATION(S): at 18:32

## 2019-02-10 RX ADMIN — HUMAN INSULIN 20 UNIT(S): 100 INJECTION, SUSPENSION SUBCUTANEOUS at 12:39

## 2019-02-10 RX ADMIN — SIMVASTATIN 20 MILLIGRAM(S): 20 TABLET, FILM COATED ORAL at 21:28

## 2019-02-10 RX ADMIN — HEPARIN SODIUM 7500 UNIT(S): 5000 INJECTION INTRAVENOUS; SUBCUTANEOUS at 14:21

## 2019-02-10 RX ADMIN — PANTOPRAZOLE SODIUM 40 MILLIGRAM(S): 20 TABLET, DELAYED RELEASE ORAL at 12:00

## 2019-02-10 RX ADMIN — FENTANYL CITRATE 44.88 MICROGRAM(S)/KG/HR: 50 INJECTION INTRAVENOUS at 19:03

## 2019-02-10 RX ADMIN — HEPARIN SODIUM 7500 UNIT(S): 5000 INJECTION INTRAVENOUS; SUBCUTANEOUS at 21:27

## 2019-02-10 RX ADMIN — Medication 1 APPLICATION(S): at 12:42

## 2019-02-10 RX ADMIN — INSULIN HUMAN 6 UNIT(S)/HR: 100 INJECTION, SOLUTION SUBCUTANEOUS at 07:41

## 2019-02-10 RX ADMIN — Medication 40 MILLIGRAM(S): at 06:46

## 2019-02-10 RX ADMIN — HEPARIN SODIUM 7500 UNIT(S): 5000 INJECTION INTRAVENOUS; SUBCUTANEOUS at 06:46

## 2019-02-10 RX ADMIN — CHLORHEXIDINE GLUCONATE 1 APPLICATION(S): 213 SOLUTION TOPICAL at 12:38

## 2019-02-10 RX ADMIN — PROPOFOL 33.66 MICROGRAM(S)/KG/MIN: 10 INJECTION, EMULSION INTRAVENOUS at 19:03

## 2019-02-10 RX ADMIN — CHLORHEXIDINE GLUCONATE 15 MILLILITER(S): 213 SOLUTION TOPICAL at 06:46

## 2019-02-10 RX ADMIN — Medication 40 MILLIGRAM(S): at 14:18

## 2019-02-10 RX ADMIN — CHLORHEXIDINE GLUCONATE 15 MILLILITER(S): 213 SOLUTION TOPICAL at 18:32

## 2019-02-10 RX ADMIN — CISATRACURIUM BESYLATE 20.2 MICROGRAM(S)/KG/MIN: 2 INJECTION INTRAVENOUS at 07:42

## 2019-02-10 RX ADMIN — Medication 40 MILLIGRAM(S): at 18:33

## 2019-02-10 RX ADMIN — HUMAN INSULIN 20 UNIT(S): 100 INJECTION, SUSPENSION SUBCUTANEOUS at 18:31

## 2019-02-10 RX ADMIN — PROPOFOL 33.66 MICROGRAM(S)/KG/MIN: 10 INJECTION, EMULSION INTRAVENOUS at 07:43

## 2019-02-10 RX ADMIN — FENTANYL CITRATE 44.88 MICROGRAM(S)/KG/HR: 50 INJECTION INTRAVENOUS at 07:43

## 2019-02-10 RX ADMIN — Medication 10 MILLIGRAM(S): at 12:58

## 2019-02-10 RX ADMIN — Medication 4: at 18:31

## 2019-02-10 RX ADMIN — CISATRACURIUM BESYLATE 20.2 MICROGRAM(S)/KG/MIN: 2 INJECTION INTRAVENOUS at 12:57

## 2019-02-10 RX ADMIN — FENTANYL CITRATE 44.88 MICROGRAM(S)/KG/HR: 50 INJECTION INTRAVENOUS at 18:32

## 2019-02-10 RX ADMIN — Medication 1 APPLICATION(S): at 06:46

## 2019-02-10 RX ADMIN — Medication 137 MICROGRAM(S): at 06:45

## 2019-02-10 NOTE — PROGRESS NOTE ADULT - ASSESSMENT
63 year old female with PMHx of HTN, DM, hypothyroidism, RA (prednisone 10mg), pulmonary HTN, CHRIS, and COPD (3L at home) who was originally admitted on 12/15 for SBO s/p ex lap with lysis of adhesions (4 retention sutures) c/b 12/30 she eviscerated 2/2 a coughing episode s/p ex lap (6 retention sutures) c/b multifocal pneumonia, developed hypercapnic respiratory failure requiring intubation 1/6, s/p bronch 1/9/18, and extubated on 1/24, course further c/b Enterococci bacteremia now readmitted to MICU for hypoxic respiratory failure 2/2 atelectasis requiring intubation 01/31    #Neuro  - Sedated   - Continue propofol and fentanyl     #Respiratory   1) Hypoxic Respiratory Failure 2/2 atelectasis requiring intubation 1/31 - now on VDR   -Bronchoscopy showing findings concerning for hyperdynamic airway collapse.   - Patient was deconditioned and unable to sit up and participate with PT leading to a functional shunt   - At this time, will continue ventilatory support   - will diurese PRN  - now dependent on NO; will continue attempting to wean    #Cardiovascular  1) Cardiogenic shock 2/2 iatrogenic   - Prior bedside US showing LVOT   - On levophed, will wean off as tolerated   - Holding amlodipine 10mg, furosemide 40mg, and metoprolol 25mg BID    - c/w Simvastatin 20mg     #GI  - SBO s/p ex-lap with findings of healthy bowel c/b evisceration of bowel requiring placement of retention sutures  - NPO with tube feeds   - Continue Protonix    #Renal/Metabolic  - Monitor for electrolyte derangements ; replete as needed  - trend BMP for BUN/Cr    #ID - will c/w antibiotics given open wound  Enterococci bacteremia  - Off antibiotics as per discussion w/ ID - had 27 days of Vanc, 3 days of Ceftriaxone, and 8 days of Zosyn  - Repeat infectious work up negative: UA, RVP  - C diff negative    #Endocrine  1) Diabetes Mellitus Type 2  - now on insulin gtt for uncontrolled FSG  - Gabapentin 400 TID held for now     2) Hypothyroidism   - Levothyroxine 137     # Rheumatology  1) Rheumatoid arthritis   - c/w Solumedrol 40 IV     #DVT ppx  - Heparin SQ     # Ethics  GOC discussion ongoing with Palliative Care team. Son (HCP) would like to give the patient time on the ventilatory before making any decisions regarding tracheostomy ; per palliative, daughter will be coming in this week to assist with decision making process 63 year old female with PMHx of HTN, DM, hypothyroidism, RA (prednisone 10mg), pulmonary HTN, CHRIS, and COPD (3L at home) who was originally admitted on 12/15 for SBO s/p ex lap with lysis of adhesions (4 retention sutures) c/b 12/30 she eviscerated 2/2 a coughing episode s/p ex lap (6 retention sutures) c/b multifocal pneumonia, developed hypercapnic respiratory failure requiring intubation 1/6, s/p bronch 1/9/18, and extubated on 1/24, course further c/b Enterococci bacteremia now readmitted to MICU for hypoxic respiratory failure 2/2 atelectasis requiring intubation 01/31    #Neuro  - Sedated   - Continue propofol and fentanyl     #Respiratory   1) Hypoxic Respiratory Failure 2/2 atelectasis requiring intubation 1/31 - now on VDR   -Bronchoscopy showing findings concerning for hyperdynamic airway collapse.   - Patient was deconditioned and unable to sit up and participate with PT leading to a functional shunt   - At this time, will continue ventilatory support   - will diurese w/ lasix 40 IV four times a day ; will keep net negative 2L per day ; will check lytes q6 on BMP to replete as needed especially K   - now dependent on NO; will keep NO and wean down vent settings as pt cannot be extubated on 90% FiO2 but can be extubated on NO     #Cardiovascular  1) Cardiogenic shock 2/2 iatrogenic   - Prior bedside US showing LVOT   - On levophed, will wean off as tolerated   - Holding amlodipine 10mg, furosemide 40mg, and metoprolol 25mg BID    - c/w Simvastatin 20mg     #GI  - SBO s/p ex-lap with findings of healthy bowel c/b evisceration of bowel requiring placement of retention sutures  - NPO with tube feeds   - Continue Protonix    #Renal/Metabolic  - Monitor for electrolyte derangements ; replete as needed  - trend BMP for BUN/Cr    #ID - will c/w antibiotics given open wound  - Off antibiotics as per discussion w/ ID - had 27 days of Vanc, 3 days of Ceftriaxone, and 8 days of Zosyn for enterococcal bacteremia and Klebsiella sputum culture   - Repeat infectious work up negative: UA, RVP  - C diff negative    #Endocrine  1) Diabetes Mellitus Type 2  - now on insulin gtt for uncontrolled FSG  - Gabapentin 400 TID held for now     2) Hypothyroidism   - Levothyroxine 137     # Rheumatology  1) Rheumatoid arthritis   - c/w Solumedrol 40 IV     #DVT ppx  - Heparin SQ     # Ethics  GOC discussion ongoing with Palliative Care team. Son (HCP) would like to give the patient time on the ventilatory before making any decisions regarding tracheostomy ; per palliative, daughter will be coming in this week to assist with decision making process 63 year old female with PMHx of HTN, DM, hypothyroidism, RA (prednisone 10mg), pulmonary HTN, CHRIS, and COPD (3L at home) who was originally admitted on 12/15 for SBO s/p ex lap with lysis of adhesions (4 retention sutures) c/b 12/30 she eviscerated 2/2 a coughing episode s/p ex lap (6 retention sutures) c/b multifocal pneumonia, developed hypercapnic respiratory failure requiring intubation 1/6, s/p bronch 1/9/18, and extubated on 1/24, course further c/b Enterococci bacteremia now readmitted to MICU for hypoxic respiratory failure 2/2 atelectasis requiring intubation 01/31    #Neuro  - Sedated   - Continue propofol and fentanyl     #Respiratory   1) Hypoxic Respiratory Failure 2/2 atelectasis requiring intubation 1/31 - now on VDR   -Bronchoscopy showing findings concerning for hyperdynamic airway collapse.   - Patient was deconditioned and unable to sit up and participate with PT leading to a functional shunt   - At this time, will continue ventilatory support   - will diurese w/ lasix 40 IV four times a day ; will keep net negative 2L per day ; will check lytes q6 on BMP to replete as needed especially K   - now dependent on NO; will keep NO and wean down vent settings as pt cannot be extubated on 90% FiO2 but can be extubated on NO     #Cardiovascular  1) Cardiogenic shock 2/2 iatrogenic   - Prior bedside US showing LVOT   - On levophed, will wean off as tolerated   - Holding amlodipine 10mg, furosemide 40mg, and metoprolol 25mg BID    - c/w Simvastatin 20mg     #GI  - SBO s/p ex-lap with findings of healthy bowel c/b evisceration of bowel requiring placement of retention sutures  - NPO with tube feeds   - Continue Protonix  - will f/u with surgery regarding abdomen    #Renal/Metabolic  - Monitor for electrolyte derangements ; replete as needed  - trend BMP for BUN/Cr    #ID - will c/w antibiotics given open wound  - Off antibiotics as per discussion w/ ID - had 27 days of Vanc, 3 days of Ceftriaxone, and 8 days of Zosyn for enterococcal bacteremia and Klebsiella sputum culture   - Repeat infectious work up negative: UA, RVP  - C diff negative    #Endocrine  1) Diabetes Mellitus Type 2  - now on insulin gtt for uncontrolled FSG  - Gabapentin 400 TID held for now     2) Hypothyroidism   - Levothyroxine 137     # Rheumatology  1) Rheumatoid arthritis   - will dc solumedrol and resume home prednisone dose     #DVT ppx  - Heparin SQ     # Ethics  GOC discussion ongoing with Palliative Care team. Son (HCP) would like to give the patient time on the ventilatory before making any decisions regarding tracheostomy ; per palliative, daughter will be coming in this week to assist with decision making process

## 2019-02-10 NOTE — PROGRESS NOTE ADULT - ATTENDING COMMENTS
Critically ill female with hypoxemic respiratory failure on VDR vent.  Was titrating down NO but with worsening hypoxemia so now back to 10 PPM.  Wean FiO2 as tolerated.   Need to aggressively diurese.  Overall prognosis is guarded.    Critical care time: 45 minutes

## 2019-02-10 NOTE — PROGRESS NOTE ADULT - SUBJECTIVE AND OBJECTIVE BOX
CHIEF COMPLAINT:    Interval Events: became hypoxic over night off NO, diuresis w/ Lasix    REVIEW OF SYSTEMS:  [x ] Unable to assess ROS because intubated/sedated    OBJECTIVE:  ICU Vital Signs Last 24 Hrs  T(C): 36.4 (10 Feb 2019 04:00), Max: 36.6 (09 Feb 2019 08:00)  T(F): 97.5 (10 Feb 2019 04:00), Max: 97.9 (09 Feb 2019 08:00)  HR: 91 (10 Feb 2019 07:05) (80 - 99)  BP: 98/74 (10 Feb 2019 07:00) (82/63 - 140/911)  BP(mean): 83 (10 Feb 2019 07:00) (70 - 107)  RR: 15 (10 Feb 2019 07:00) (13 - 29)  SpO2: 100% (10 Feb 2019 07:05) (80% - 100%)    Mode: VDR4, RR (machine): 15, FiO2: 100, PEEP: 15, ITime: 1, MAP: 25, PC: 35, Frequency: 650    02-09 @ 07:01  -  02-10 @ 07:00  --------------------------------------------------------  IN: 3523.2 mL / OUT: 3257 mL / NET: 266.2 mL    CAPILLARY BLOOD GLUCOSE    POCT Blood Glucose.: 110 mg/dL (10 Feb 2019 06:33)      PHYSICAL EXAM:  General: NAD  HEENT: NC/AT; PERRL, clear conjunctiva  Neck: supple  Respiratory: CTA b/l  Cardiovascular: +S1/S2; RRR  Abdomen: soft, NT/ND; +BS x4  Extremities: WWP, 2+ peripheral pulses b/l; no LE edema  Skin: normal color and turgor; no rash  Neurological:    LINES:    HOSPITAL MEDICATIONS:  Standing Meds:  chlorhexidine 0.12% Liquid 15 milliLiter(s) Swish and Spit every 12 hours  chlorhexidine 4% Liquid 1 Application(s) Topical <User Schedule>  cisatracurium Infusion 3 MICROgram(s)/kG/Min IV Continuous <Continuous>  collagenase Ointment 1 Application(s) Topical daily  dextrose 5%. 1000 milliLiter(s) IV Continuous <Continuous>  fentaNYL   Infusion. 4 MICROgram(s)/kG/Hr IV Continuous <Continuous>  heparin  Injectable 7500 Unit(s) SubCutaneous every 8 hours  influenza   Vaccine 0.5 milliLiter(s) IntraMuscular once  insulin Infusion 6 Unit(s)/Hr IV Continuous <Continuous>  levothyroxine 137 MICROGram(s) Oral daily  methylPREDNISolone sodium succinate Injectable 40 milliGRAM(s) IV Push daily  norepinephrine Infusion 0.05 MICROgram(s)/kG/Min IV Continuous <Continuous>  petrolatum Ophthalmic Ointment 1 Application(s) Both EYES every 12 hours  propofol Infusion 50 MICROgram(s)/kG/Min IV Continuous <Continuous>  simvastatin 20 milliGRAM(s) Oral at bedtime      PRN Meds:  acetaminophen    Suspension .. 650 milliGRAM(s) Oral every 6 hours PRN  glucagon  Injectable 1 milliGRAM(s) IntraMuscular once PRN      LABS:                        7.2    17.50 )-----------( 509      ( 10 Feb 2019 02:45 )             24.7     Hgb Trend: 7.2<--, 7.1<--, 7.0<--, 7.9<--, 7.8<--  02-10    143  |  107  |  27<H>  ----------------------------<  120<H>  3.8   |  22  |  0.75    Ca    8.6      10 Feb 2019 02:45  Phos  3.3     02-10  Mg     2.2     02-10    TPro  5.5<L>  /  Alb  2.2<L>  /  TBili  < 0.2<L>  /  DBili  x   /  AST  25  /  ALT  16  /  AlkPhos  154<H>  02-09    Creatinine Trend: 0.75<--, 0.80<--, 0.77<--, 0.77<--, 0.82<--, 0.90<--  PT/INR - ( 09 Feb 2019 02:15 )   PT: 12.3 SEC;   INR: 1.08          PTT - ( 09 Feb 2019 02:15 )  PTT:40.1 SEC    Arterial Blood Gas:  02-10 @ 05:55  7.29/54/31/23/46.2/-0.5  ABG lactate: 2.2  Arterial Blood Gas:  02-09 @ 23:40  7.39/38/55/23/86.9/-1.6  ABG lactate: 2.3  Arterial Blood Gas:  02-09 @ 04:30  7.41/34/59/22/90.4/-2.5  ABG lactate: 1.4  Arterial Blood Gas:  02-08 @ 23:09  7.32/44/65/21/90.7/-3.5  ABG lactate: 1.8 CHIEF COMPLAINT:    Interval Events: became hypoxic over night off NO, diuresis w/ Lasix    REVIEW OF SYSTEMS:  [x ] Unable to assess ROS because intubated/sedated    OBJECTIVE:  ICU Vital Signs Last 24 Hrs  T(C): 36.4 (10 Feb 2019 04:00), Max: 36.6 (09 Feb 2019 08:00)  T(F): 97.5 (10 Feb 2019 04:00), Max: 97.9 (09 Feb 2019 08:00)  HR: 91 (10 Feb 2019 07:05) (80 - 99)  BP: 98/74 (10 Feb 2019 07:00) (82/63 - 140/911)  BP(mean): 83 (10 Feb 2019 07:00) (70 - 107)  RR: 15 (10 Feb 2019 07:00) (13 - 29)  SpO2: 100% (10 Feb 2019 07:05) (80% - 100%)    Mode: VDR4, RR (machine): 15, FiO2: 100, PEEP: 15, ITime: 1, MAP: 25, PC: 35, Frequency: 650    02-09 @ 07:01  -  02-10 @ 07:00  --------------------------------------------------------  IN: 3523.2 mL / OUT: 3257 mL / NET: 266.2 mL    CAPILLARY BLOOD GLUCOSE    POCT Blood Glucose.: 110 mg/dL (10 Feb 2019 06:33)      PHYSICAL EXAM:  General: NAD  HEENT: NC/AT; PERRL, clear conjunctiva  Neck: supple  Respiratory: CTA b/l  Cardiovascular: +S1/S2; RRR  Abdomen: soft, NT/ND; +BS x4  Extremities: WWP, 2+ peripheral pulses b/l; anasarca  Skin: normal color and turgor; no rash  Neurological: unable to assess given intubation/sedation     LINES:    HOSPITAL MEDICATIONS:  Standing Meds:  chlorhexidine 0.12% Liquid 15 milliLiter(s) Swish and Spit every 12 hours  chlorhexidine 4% Liquid 1 Application(s) Topical <User Schedule>  cisatracurium Infusion 3 MICROgram(s)/kG/Min IV Continuous <Continuous>  collagenase Ointment 1 Application(s) Topical daily  dextrose 5%. 1000 milliLiter(s) IV Continuous <Continuous>  fentaNYL   Infusion. 4 MICROgram(s)/kG/Hr IV Continuous <Continuous>  heparin  Injectable 7500 Unit(s) SubCutaneous every 8 hours  influenza   Vaccine 0.5 milliLiter(s) IntraMuscular once  insulin Infusion 6 Unit(s)/Hr IV Continuous <Continuous>  levothyroxine 137 MICROGram(s) Oral daily  methylPREDNISolone sodium succinate Injectable 40 milliGRAM(s) IV Push daily  norepinephrine Infusion 0.05 MICROgram(s)/kG/Min IV Continuous <Continuous>  petrolatum Ophthalmic Ointment 1 Application(s) Both EYES every 12 hours  propofol Infusion 50 MICROgram(s)/kG/Min IV Continuous <Continuous>  simvastatin 20 milliGRAM(s) Oral at bedtime    PRN Meds:  acetaminophen    Suspension .. 650 milliGRAM(s) Oral every 6 hours PRN  glucagon  Injectable 1 milliGRAM(s) IntraMuscular once PRN      LABS:                        7.2    17.50 )-----------( 509      ( 10 Feb 2019 02:45 )             24.7     Hgb Trend: 7.2<--, 7.1<--, 7.0<--, 7.9<--, 7.8<--  02-10    143  |  107  |  27<H>  ----------------------------<  120<H>  3.8   |  22  |  0.75    Ca    8.6      10 Feb 2019 02:45  Phos  3.3     02-10  Mg     2.2     02-10    TPro  5.5<L>  /  Alb  2.2<L>  /  TBili  < 0.2<L>  /  DBili  x   /  AST  25  /  ALT  16  /  AlkPhos  154<H>  02-09    Creatinine Trend: 0.75<--, 0.80<--, 0.77<--, 0.77<--, 0.82<--, 0.90<--  PT/INR - ( 09 Feb 2019 02:15 )   PT: 12.3 SEC;   INR: 1.08          PTT - ( 09 Feb 2019 02:15 )  PTT:40.1 SEC    Arterial Blood Gas:  02-10 @ 05:55  7.29/54/31/23/46.2/-0.5  ABG lactate: 2.2  Arterial Blood Gas:  02-09 @ 23:40  7.39/38/55/23/86.9/-1.6  ABG lactate: 2.3  Arterial Blood Gas:  02-09 @ 04:30  7.41/34/59/22/90.4/-2.5  ABG lactate: 1.4  Arterial Blood Gas:  02-08 @ 23:09  7.32/44/65/21/90.7/-3.5  ABG lactate: 1.8

## 2019-02-11 ENCOUNTER — APPOINTMENT (OUTPATIENT)
Dept: ENDOCRINOLOGY | Facility: CLINIC | Age: 64
End: 2019-02-11

## 2019-02-11 LAB
ALBUMIN SERPL ELPH-MCNC: 2.7 G/DL — LOW (ref 3.3–5)
ALP SERPL-CCNC: 190 U/L — HIGH (ref 40–120)
ALT FLD-CCNC: 21 U/L — SIGNIFICANT CHANGE UP (ref 4–33)
ANION GAP SERPL CALC-SCNC: 17 MMO/L — HIGH (ref 7–14)
AST SERPL-CCNC: 28 U/L — SIGNIFICANT CHANGE UP (ref 4–32)
BASE EXCESS BLDA CALC-SCNC: 1.6 MMOL/L — SIGNIFICANT CHANGE UP
BASE EXCESS BLDA CALC-SCNC: 3.6 MMOL/L — SIGNIFICANT CHANGE UP
BASE EXCESS BLDV CALC-SCNC: 6 MMOL/L — SIGNIFICANT CHANGE UP
BASOPHILS # BLD AUTO: 0.13 K/UL — SIGNIFICANT CHANGE UP (ref 0–0.2)
BASOPHILS NFR BLD AUTO: 0.7 % — SIGNIFICANT CHANGE UP (ref 0–2)
BILIRUB SERPL-MCNC: 0.3 MG/DL — SIGNIFICANT CHANGE UP (ref 0.2–1.2)
BLOOD GAS VENOUS - CREATININE: 0.56 MG/DL — SIGNIFICANT CHANGE UP (ref 0.5–1.3)
BUN SERPL-MCNC: 32 MG/DL — HIGH (ref 7–23)
CA-I BLDA-SCNC: 1.17 MMOL/L — SIGNIFICANT CHANGE UP (ref 1.15–1.29)
CA-I BLDA-SCNC: 1.21 MMOL/L — SIGNIFICANT CHANGE UP (ref 1.15–1.29)
CALCIUM SERPL-MCNC: 8.9 MG/DL — SIGNIFICANT CHANGE UP (ref 8.4–10.5)
CHLORIDE BLDV-SCNC: 107 MMOL/L — SIGNIFICANT CHANGE UP (ref 96–108)
CHLORIDE SERPL-SCNC: 103 MMOL/L — SIGNIFICANT CHANGE UP (ref 98–107)
CO2 SERPL-SCNC: 22 MMOL/L — SIGNIFICANT CHANGE UP (ref 22–31)
CREAT SERPL-MCNC: 0.69 MG/DL — SIGNIFICANT CHANGE UP (ref 0.5–1.3)
EOSINOPHIL # BLD AUTO: 0.14 K/UL — SIGNIFICANT CHANGE UP (ref 0–0.5)
EOSINOPHIL NFR BLD AUTO: 0.8 % — SIGNIFICANT CHANGE UP (ref 0–6)
GAS PNL BLDV: 142 MMOL/L — SIGNIFICANT CHANGE UP (ref 136–146)
GLUCOSE BLDA-MCNC: 206 MG/DL — HIGH (ref 70–99)
GLUCOSE BLDA-MCNC: 275 MG/DL — HIGH (ref 70–99)
GLUCOSE BLDC GLUCOMTR-MCNC: 194 MG/DL — HIGH (ref 70–99)
GLUCOSE BLDC GLUCOMTR-MCNC: 204 MG/DL — HIGH (ref 70–99)
GLUCOSE BLDC GLUCOMTR-MCNC: 226 MG/DL — HIGH (ref 70–99)
GLUCOSE BLDC GLUCOMTR-MCNC: 257 MG/DL — HIGH (ref 70–99)
GLUCOSE BLDC GLUCOMTR-MCNC: 257 MG/DL — HIGH (ref 70–99)
GLUCOSE BLDV-MCNC: 124 — HIGH (ref 70–99)
GLUCOSE SERPL-MCNC: 192 MG/DL — HIGH (ref 70–99)
HCO3 BLDA-SCNC: 26 MMOL/L — SIGNIFICANT CHANGE UP (ref 22–26)
HCO3 BLDA-SCNC: 28 MMOL/L — HIGH (ref 22–26)
HCO3 BLDV-SCNC: 29 MMOL/L — HIGH (ref 20–27)
HCT VFR BLD CALC: 26.4 % — LOW (ref 34.5–45)
HCT VFR BLDA CALC: 24.5 % — LOW (ref 34.5–46.5)
HCT VFR BLDA CALC: 25.5 % — LOW (ref 34.5–46.5)
HCT VFR BLDV CALC: 22.5 % — LOW (ref 34.5–45)
HGB BLD-MCNC: 8 G/DL — LOW (ref 11.5–15.5)
HGB BLDA-MCNC: 7.9 G/DL — LOW (ref 11.5–15.5)
HGB BLDA-MCNC: 8.2 G/DL — LOW (ref 11.5–15.5)
HGB BLDV-MCNC: 7.2 G/DL — LOW (ref 11.5–15.5)
IMM GRANULOCYTES NFR BLD AUTO: 8.6 % — HIGH (ref 0–1.5)
LACTATE BLDA-SCNC: 1.8 MMOL/L — SIGNIFICANT CHANGE UP (ref 0.5–2)
LACTATE BLDA-SCNC: 2 MMOL/L — SIGNIFICANT CHANGE UP (ref 0.5–2)
LACTATE BLDV-MCNC: 2.5 MMOL/L — HIGH (ref 0.5–2)
LYMPHOCYTES # BLD AUTO: 12.2 % — LOW (ref 13–44)
LYMPHOCYTES # BLD AUTO: 2.16 K/UL — SIGNIFICANT CHANGE UP (ref 1–3.3)
MAGNESIUM SERPL-MCNC: 2.1 MG/DL — SIGNIFICANT CHANGE UP (ref 1.6–2.6)
MCHC RBC-ENTMCNC: 30.3 % — LOW (ref 32–36)
MCHC RBC-ENTMCNC: 30.5 PG — SIGNIFICANT CHANGE UP (ref 27–34)
MCV RBC AUTO: 100.8 FL — HIGH (ref 80–100)
MONOCYTES # BLD AUTO: 1.44 K/UL — HIGH (ref 0–0.9)
MONOCYTES NFR BLD AUTO: 8.2 % — SIGNIFICANT CHANGE UP (ref 2–14)
NEUTROPHILS # BLD AUTO: 12.27 K/UL — HIGH (ref 1.8–7.4)
NEUTROPHILS NFR BLD AUTO: 69.5 % — SIGNIFICANT CHANGE UP (ref 43–77)
NRBC # FLD: 2.01 K/UL — LOW (ref 25–125)
NRBC FLD-RTO: 11.4 — SIGNIFICANT CHANGE UP
PCO2 BLDA: 37 MMHG — SIGNIFICANT CHANGE UP (ref 32–48)
PCO2 BLDA: 41 MMHG — SIGNIFICANT CHANGE UP (ref 32–48)
PCO2 BLDV: 47 MMHG — SIGNIFICANT CHANGE UP (ref 41–51)
PH BLDA: 7.44 PH — SIGNIFICANT CHANGE UP (ref 7.35–7.45)
PH BLDA: 7.45 PH — SIGNIFICANT CHANGE UP (ref 7.35–7.45)
PH BLDV: 7.43 PH — SIGNIFICANT CHANGE UP (ref 7.32–7.43)
PHOSPHATE SERPL-MCNC: 3.7 MG/DL — SIGNIFICANT CHANGE UP (ref 2.5–4.5)
PLATELET # BLD AUTO: 604 K/UL — HIGH (ref 150–400)
PMV BLD: 10.5 FL — SIGNIFICANT CHANGE UP (ref 7–13)
PO2 BLDA: 58 MMHG — LOW (ref 83–108)
PO2 BLDA: 64 MMHG — LOW (ref 83–108)
PO2 BLDV: 34 MMHG — LOW (ref 35–40)
POTASSIUM BLDA-SCNC: 3.6 MMOL/L — SIGNIFICANT CHANGE UP (ref 3.4–4.5)
POTASSIUM BLDA-SCNC: 3.7 MMOL/L — SIGNIFICANT CHANGE UP (ref 3.4–4.5)
POTASSIUM BLDV-SCNC: 3.4 MMOL/L — SIGNIFICANT CHANGE UP (ref 3.4–4.5)
POTASSIUM SERPL-MCNC: 3.8 MMOL/L — SIGNIFICANT CHANGE UP (ref 3.5–5.3)
POTASSIUM SERPL-SCNC: 3.8 MMOL/L — SIGNIFICANT CHANGE UP (ref 3.5–5.3)
PROT SERPL-MCNC: 6.5 G/DL — SIGNIFICANT CHANGE UP (ref 6–8.3)
RBC # BLD: 2.62 M/UL — LOW (ref 3.8–5.2)
RBC # FLD: 20.8 % — HIGH (ref 10.3–14.5)
SAO2 % BLDA: 88.3 % — LOW (ref 95–99)
SAO2 % BLDA: 91.2 % — LOW (ref 95–99)
SAO2 % BLDV: 55.6 % — LOW (ref 60–85)
SODIUM BLDA-SCNC: 141 MMOL/L — SIGNIFICANT CHANGE UP (ref 136–146)
SODIUM BLDA-SCNC: 143 MMOL/L — SIGNIFICANT CHANGE UP (ref 136–146)
SODIUM SERPL-SCNC: 142 MMOL/L — SIGNIFICANT CHANGE UP (ref 135–145)
WBC # BLD: 17.66 K/UL — HIGH (ref 3.8–10.5)
WBC # FLD AUTO: 17.66 K/UL — HIGH (ref 3.8–10.5)

## 2019-02-11 PROCEDURE — 99291 CRITICAL CARE FIRST HOUR: CPT | Mod: 25

## 2019-02-11 PROCEDURE — 99232 SBSQ HOSP IP/OBS MODERATE 35: CPT

## 2019-02-11 PROCEDURE — 76604 US EXAM CHEST: CPT | Mod: 26,GC

## 2019-02-11 PROCEDURE — 71045 X-RAY EXAM CHEST 1 VIEW: CPT | Mod: 26

## 2019-02-11 PROCEDURE — 93308 TTE F-UP OR LMTD: CPT | Mod: 26,GC

## 2019-02-11 RX ORDER — HUMAN INSULIN 100 [IU]/ML
24 INJECTION, SUSPENSION SUBCUTANEOUS EVERY 6 HOURS
Qty: 0 | Refills: 0 | Status: DISCONTINUED | OUTPATIENT
Start: 2019-02-11 | End: 2019-02-11

## 2019-02-11 RX ORDER — NOREPINEPHRINE BITARTRATE/D5W 8 MG/250ML
0.05 PLASTIC BAG, INJECTION (ML) INTRAVENOUS
Qty: 16 | Refills: 0 | Status: DISCONTINUED | OUTPATIENT
Start: 2019-02-11 | End: 2019-02-12

## 2019-02-11 RX ORDER — PROPOFOL 10 MG/ML
50 INJECTION, EMULSION INTRAVENOUS
Qty: 1000 | Refills: 0 | Status: DISCONTINUED | OUTPATIENT
Start: 2019-02-11 | End: 2019-02-11

## 2019-02-11 RX ORDER — HUMAN INSULIN 100 [IU]/ML
28 INJECTION, SUSPENSION SUBCUTANEOUS EVERY 6 HOURS
Refills: 0 | Status: DISCONTINUED | OUTPATIENT
Start: 2019-02-11 | End: 2019-02-15

## 2019-02-11 RX ORDER — CISATRACURIUM BESYLATE 2 MG/ML
3 INJECTION INTRAVENOUS
Qty: 200 | Refills: 0 | Status: DISCONTINUED | OUTPATIENT
Start: 2019-02-11 | End: 2019-02-15

## 2019-02-11 RX ORDER — FUROSEMIDE 40 MG
40 TABLET ORAL EVERY 8 HOURS
Qty: 0 | Refills: 0 | Status: DISCONTINUED | OUTPATIENT
Start: 2019-02-11 | End: 2019-02-12

## 2019-02-11 RX ORDER — PROPOFOL 10 MG/ML
50 INJECTION, EMULSION INTRAVENOUS
Qty: 1000 | Refills: 0 | Status: DISCONTINUED | OUTPATIENT
Start: 2019-02-11 | End: 2019-02-20

## 2019-02-11 RX ORDER — FENTANYL CITRATE 50 UG/ML
2 INJECTION INTRAVENOUS
Qty: 2500 | Refills: 0 | Status: DISCONTINUED | OUTPATIENT
Start: 2019-02-11 | End: 2019-02-18

## 2019-02-11 RX ORDER — HUMAN INSULIN 100 [IU]/ML
24 INJECTION, SUSPENSION SUBCUTANEOUS ONCE
Qty: 0 | Refills: 0 | Status: COMPLETED | OUTPATIENT
Start: 2019-02-11 | End: 2019-02-11

## 2019-02-11 RX ADMIN — Medication 6: at 17:32

## 2019-02-11 RX ADMIN — Medication 10 MILLIGRAM(S): at 06:06

## 2019-02-11 RX ADMIN — HEPARIN SODIUM 7500 UNIT(S): 5000 INJECTION INTRAVENOUS; SUBCUTANEOUS at 06:06

## 2019-02-11 RX ADMIN — PROPOFOL 33.66 MICROGRAM(S)/KG/MIN: 10 INJECTION, EMULSION INTRAVENOUS at 08:01

## 2019-02-11 RX ADMIN — Medication 1 APPLICATION(S): at 11:20

## 2019-02-11 RX ADMIN — Medication 1 APPLICATION(S): at 06:05

## 2019-02-11 RX ADMIN — CHLORHEXIDINE GLUCONATE 15 MILLILITER(S): 213 SOLUTION TOPICAL at 06:05

## 2019-02-11 RX ADMIN — CISATRACURIUM BESYLATE 20.2 MICROGRAM(S)/KG/MIN: 2 INJECTION INTRAVENOUS at 08:01

## 2019-02-11 RX ADMIN — Medication 40 MILLIGRAM(S): at 00:06

## 2019-02-11 RX ADMIN — SIMVASTATIN 20 MILLIGRAM(S): 20 TABLET, FILM COATED ORAL at 21:45

## 2019-02-11 RX ADMIN — HUMAN INSULIN 24 UNIT(S): 100 INJECTION, SUSPENSION SUBCUTANEOUS at 00:55

## 2019-02-11 RX ADMIN — HEPARIN SODIUM 7500 UNIT(S): 5000 INJECTION INTRAVENOUS; SUBCUTANEOUS at 13:52

## 2019-02-11 RX ADMIN — Medication 4: at 06:06

## 2019-02-11 RX ADMIN — Medication 40 MILLIGRAM(S): at 13:53

## 2019-02-11 RX ADMIN — Medication 40 MILLIGRAM(S): at 21:44

## 2019-02-11 RX ADMIN — Medication 4: at 00:06

## 2019-02-11 RX ADMIN — Medication 10 MILLIGRAM(S): at 13:52

## 2019-02-11 RX ADMIN — HUMAN INSULIN 24 UNIT(S): 100 INJECTION, SUSPENSION SUBCUTANEOUS at 11:39

## 2019-02-11 RX ADMIN — Medication 40 MILLIGRAM(S): at 06:11

## 2019-02-11 RX ADMIN — Medication 20 MILLIGRAM(S): at 21:45

## 2019-02-11 RX ADMIN — Medication 137 MICROGRAM(S): at 06:10

## 2019-02-11 RX ADMIN — FENTANYL CITRATE 44.88 MICROGRAM(S)/KG/HR: 50 INJECTION INTRAVENOUS at 08:01

## 2019-02-11 RX ADMIN — HUMAN INSULIN 28 UNIT(S): 100 INJECTION, SUSPENSION SUBCUTANEOUS at 17:32

## 2019-02-11 RX ADMIN — Medication 6: at 11:40

## 2019-02-11 RX ADMIN — PANTOPRAZOLE SODIUM 40 MILLIGRAM(S): 20 TABLET, DELAYED RELEASE ORAL at 11:19

## 2019-02-11 RX ADMIN — HUMAN INSULIN 24 UNIT(S): 100 INJECTION, SUSPENSION SUBCUTANEOUS at 06:10

## 2019-02-11 RX ADMIN — HEPARIN SODIUM 7500 UNIT(S): 5000 INJECTION INTRAVENOUS; SUBCUTANEOUS at 21:44

## 2019-02-11 RX ADMIN — CHLORHEXIDINE GLUCONATE 1 APPLICATION(S): 213 SOLUTION TOPICAL at 11:19

## 2019-02-11 NOTE — PROGRESS NOTE ADULT - PROBLEM SELECTOR PLAN 1
BG elevated above goal of 140-180 mg/dl.  Increase NPH to 28 U q 6 hours (hold if feeds are held) and continue moderate dose humalog correctional scale q6h.

## 2019-02-11 NOTE — PROGRESS NOTE ADULT - SUBJECTIVE AND OBJECTIVE BOX
CHIEF COMPLAINT:    Interval Events: no acute events over night    REVIEW OF SYSTEMS:  [x ] Unable to assess ROS because of intubated/sedated     OBJECTIVE:  ICU Vital Signs Last 24 Hrs  T(C): 36.7 (11 Feb 2019 04:00), Max: 36.9 (10 Feb 2019 08:00)  T(F): 98.1 (11 Feb 2019 04:00), Max: 98.5 (10 Feb 2019 08:00)  HR: 117 (11 Feb 2019 07:00) (81 - 121)  BP: 138/79 (11 Feb 2019 07:00) (99/75 - 163/111)  BP(mean): 91 (11 Feb 2019 07:00) (76 - 125)  RR: 14 (11 Feb 2019 07:00) (14 - 15)  SpO2: 96% (11 Feb 2019 07:00) (90% - 100%)    Mode: VDR 4, RR (machine): 15, FiO2: 60, PEEP: 15, ITime: 2, MAP: 25, PC: 33, PIP: 33, Frequency: 645    02-10 @ 07:01  -  02-11 @ 07:00  --------------------------------------------------------  IN: 3428.8 mL / OUT: 5675 mL / NET: -2246.2 mL    CAPILLARY BLOOD GLUCOSE    POCT Blood Glucose.: 226 mg/dL (11 Feb 2019 06:04)    PHYSICAL EXAM:  General: NAD  HEENT: NC/AT; PERRL, clear conjunctiva  Neck: supple  Respiratory: CTA b/l  Cardiovascular: +S1/S2; RRR  Abdomen: soft, NT/ND; +BS x4, L-sided abdominal dressing  Extremities: WWP, 2+ peripheral pulses b/l; anasarca  Skin: normal color and turgor; no rash  Neurological: unable to assess given intubation/sedation     LINES:    HOSPITAL MEDICATIONS:  Standing Meds:  chlorhexidine 0.12% Liquid 15 milliLiter(s) Swish and Spit every 12 hours  chlorhexidine 4% Liquid 1 Application(s) Topical <User Schedule>  cisatracurium Infusion 3 MICROgram(s)/kG/Min IV Continuous <Continuous>  collagenase Ointment 1 Application(s) Topical daily  dextrose 5%. 1000 milliLiter(s) IV Continuous <Continuous>  dextrose 50% Injectable 12.5 Gram(s) IV Push once  dextrose 50% Injectable 25 Gram(s) IV Push once  dextrose 50% Injectable 25 Gram(s) IV Push once  fentaNYL   Infusion. 4 MICROgram(s)/kG/Hr IV Continuous <Continuous>  furosemide   Injectable 40 milliGRAM(s) IV Push every 6 hours  heparin  Injectable 7500 Unit(s) SubCutaneous every 8 hours  influenza   Vaccine 0.5 milliLiter(s) IntraMuscular once  insulin lispro (HumaLOG) corrective regimen sliding scale   SubCutaneous every 6 hours  insulin NPH human recombinant 24 Unit(s) SubCutaneous every 6 hours  levothyroxine 137 MICROGram(s) Oral daily  methylPREDNISolone sodium succinate Injectable 10 milliGRAM(s) IV Push daily  pantoprazole  Injectable 40 milliGRAM(s) IV Push daily  petrolatum Ophthalmic Ointment 1 Application(s) Both EYES every 12 hours  propofol Infusion 50 MICROgram(s)/kG/Min IV Continuous <Continuous>  simvastatin 20 milliGRAM(s) Oral at bedtime      PRN Meds:  acetaminophen    Suspension .. 650 milliGRAM(s) Oral every 6 hours PRN  dextrose 40% Gel 15 Gram(s) Oral once PRN  glucagon  Injectable 1 milliGRAM(s) IntraMuscular once PRN      LABS:                        8.0    17.66 )-----------( 604      ( 10 Feb 2019 23:25 )             26.4     Hgb Trend: 8.0<--, 7.2<--, 7.1<--, 7.0<--, 7.9<--  02-10    142  |  103  |  32<H>  ----------------------------<  192<H>  3.8   |  22  |  0.69    Ca    8.9      10 Feb 2019 23:25  Phos  3.7     02-10  Mg     2.1     02-10    TPro  6.5  /  Alb  2.7<L>  /  TBili  0.3  /  DBili  x   /  AST  28  /  ALT  21  /  AlkPhos  190<H>  02-10    Creatinine Trend: 0.69<--, 0.74<--, 0.75<--, 0.80<--, 0.77<--, 0.77<--    Arterial Blood Gas:  02-10 @ 23:25  7.45/37/64/26/91.2/1.6  ABG lactate: 2.0  Arterial Blood Gas:  02-10 @ 14:35  7.39/40/64/24/90.8/-0.8  ABG lactate: 1.8  Arterial Blood Gas:  02-10 @ 05:55  7.29/54/31/23/46.2/-0.5  ABG lactate: 2.2  Arterial Blood Gas:  02-09 @ 23:40  7.39/38/55/23/86.9/-1.6  ABG lactate: 2.3

## 2019-02-11 NOTE — PROGRESS NOTE ADULT - SUBJECTIVE AND OBJECTIVE BOX
Chief Complaint: DM2    History: Noted with hyperglycemia. Continues on enteral feeds Glucerna @ 40 cc/hour x 24 h. Off insulin drip.    MEDICATIONS  (STANDING):  chlorhexidine 0.12% Liquid 15 milliLiter(s) Swish and Spit every 12 hours  chlorhexidine 4% Liquid 1 Application(s) Topical <User Schedule>  cisatracurium Infusion 3 MICROgram(s)/kG/Min (20.196 mL/Hr) IV Continuous <Continuous>  collagenase Ointment 1 Application(s) Topical daily  dextrose 5%. 1000 milliLiter(s) (50 mL/Hr) IV Continuous <Continuous>  dextrose 50% Injectable 12.5 Gram(s) IV Push once  dextrose 50% Injectable 25 Gram(s) IV Push once  dextrose 50% Injectable 25 Gram(s) IV Push once  fentaNYL   Infusion. 4 MICROgram(s)/kG/Hr (44.88 mL/Hr) IV Continuous <Continuous>  furosemide   Injectable 40 milliGRAM(s) IV Push every 8 hours  heparin  Injectable 7500 Unit(s) SubCutaneous every 8 hours  influenza   Vaccine 0.5 milliLiter(s) IntraMuscular once  insulin lispro (HumaLOG) corrective regimen sliding scale   SubCutaneous every 6 hours  insulin NPH human recombinant 24 Unit(s) SubCutaneous every 6 hours  levothyroxine 137 MICROGram(s) Oral daily  methylPREDNISolone sodium succinate Injectable 10 milliGRAM(s) IV Push daily  pantoprazole  Injectable 40 milliGRAM(s) IV Push daily  petrolatum Ophthalmic Ointment 1 Application(s) Both EYES every 12 hours  propofol Infusion 50 MICROgram(s)/kG/Min (33.66 mL/Hr) IV Continuous <Continuous>  sildenafil (REVATIO) 10 milliGRAM(s) Oral three times a day  simvastatin 20 milliGRAM(s) Oral at bedtime    MEDICATIONS  (PRN):  acetaminophen    Suspension .. 650 milliGRAM(s) Oral every 6 hours PRN Temp greater or equal to 38C (100.4F), Mild Pain (1 - 3), Moderate Pain (4 - 6)  dextrose 40% Gel 15 Gram(s) Oral once PRN Blood Glucose LESS THAN 70 milliGRAM(s)/deciliter  glucagon  Injectable 1 milliGRAM(s) IntraMuscular once PRN Glucose LESS THAN 70 milligrams/deciliter      Allergies    No Known Allergies    Intolerances    Seroquel (Other)    Review of Systems:    UNABLE TO OBTAIN    PHYSICAL EXAM:  VITALS: T(C): 36.6 (02-11-19 @ 12:00)  T(F): 97.8 (02-11-19 @ 12:00), Max: 98.1 (02-11-19 @ 04:00)  HR: 103 (02-11-19 @ 14:00) (88 - 121)  BP: 131/73 (02-11-19 @ 14:00) (99/75 - 163/111)  RR:  (14 - 16)  SpO2:  (90% - 98%)  Wt(kg): --  GENERAL: NAD, sedated  HEENT:  Atraumatic, Normocephalic ETT in place  RESPIRATORY: on vent  CARDIOVASCULAR: tachycardic  GI: enteral feeds infusing via OGT  PSYCH: cannot assess      CAPILLARY BLOOD GLUCOSE      POCT Blood Glucose.: 257 mg/dL (11 Feb 2019 11:34)  POCT Blood Glucose.: 257 mg/dL (11 Feb 2019 11:16)  POCT Blood Glucose.: 226 mg/dL (11 Feb 2019 06:04)  POCT Blood Glucose.: 194 mg/dL (11 Feb 2019 00:52)  POCT Blood Glucose.: 204 mg/dL (11 Feb 2019 00:05)  POCT Blood Glucose.: 244 mg/dL (10 Feb 2019 18:00)      02-10    142  |  103  |  32<H>  ----------------------------<  192<H>  3.8   |  22  |  0.69    EGFR if : 109  EGFR if non : 94    Ca    8.9      02-10  Mg     2.1     02-10  Phos  3.7     02-10    TPro  6.5  /  Alb  2.7<L>  /  TBili  0.3  /  DBili  x   /  AST  28  /  ALT  21  /  AlkPhos  190<H>  02-10          Thyroid Function Tests:      Hemoglobin A1C, Whole Blood: 7.1 % <H> [4.0 - 5.6] (12-17-18 @ 03:50)  Hemoglobin A1C, Whole Blood: 7.3 % <H> [4.0 - 5.6] (12-16-18 @ 04:17)

## 2019-02-11 NOTE — PROGRESS NOTE ADULT - ATTENDING COMMENTS
63 year old female with HTN, DM, hypothyroidism, RA, pulmonary HTN, CHRIS, and COPD (3L at home) originally admitted on 12/15 for SBO s/p ex lap with lysis of adhesions, 12/30 she eviscerated 2/2 a coughing episode had to go back to OR, c/b multifocal pneumonia, enterococcus bacteremia, hypercapnic respiratory intubated 1/6, extubated on 1/24, readmitted to MICU with hypoxic respiratory failure requiring intubation     - currently on VDR and nitric oxide with profound hypoxia  - remains sedated, paralyzed and on vasopressors   - afebrile overnight  - good urine output with stable creatinine  - continue diuresis attempts to keep net negative about 1.5 liters    profound hypoxemic respiratory failure not improving with multiple interventions. Prognosis guarded    critical care time 40 minutes

## 2019-02-11 NOTE — PROGRESS NOTE ADULT - SUBJECTIVE AND OBJECTIVE BOX
SURGERY  Pager:    STATUS POST:      POST OPERATIVE DAY #      INTERVAL EVENTS/SUBJECTIVE:     ______________________________________________  OBJECTIVE:   T(C): 36.7 (02-11-19 @ 04:00), Max: 36.9 (02-10-19 @ 08:00)  HR: 118 (02-11-19 @ 06:00) (81 - 121)  BP: 99/75 (02-11-19 @ 06:00) (98/74 - 163/111)  RR: 15 (02-11-19 @ 06:00) (14 - 15)  SpO2: 94% (02-11-19 @ 06:00) (90% - 100%)  Wt(kg): --  CAPILLARY BLOOD GLUCOSE      POCT Blood Glucose.: 226 mg/dL (11 Feb 2019 06:04)  POCT Blood Glucose.: 194 mg/dL (11 Feb 2019 00:52)  POCT Blood Glucose.: 204 mg/dL (11 Feb 2019 00:05)  POCT Blood Glucose.: 244 mg/dL (10 Feb 2019 18:00)  POCT Blood Glucose.: 190 mg/dL (10 Feb 2019 14:32)  POCT Blood Glucose.: 131 mg/dL (10 Feb 2019 12:33)  POCT Blood Glucose.: 111 mg/dL (10 Feb 2019 08:35)    I&O's Detail    09 Feb 2019 07:01  -  10 Feb 2019 07:00  --------------------------------------------------------  IN:    cisatracurium Infusion: 363.8 mL    Enteral Tube Flush: 120 mL    fentaNYL Infusion.: 1080 mL    Glucerna: 1020 mL    insulin Infusion: 90 mL    insulin Infusion: 16 mL    insulin Infusion: 25 mL    norepinephrine Infusion: 2 mL    propofol Infusion: 806.4 mL  Total IN: 3523.2 mL    OUT:    Indwelling Catheter - Urethral: 3182 mL    Rectal Tube: 75 mL  Total OUT: 3257 mL    Total NET: 266.2 mL      10 Feb 2019 07:01  -  11 Feb 2019 06:48  --------------------------------------------------------  IN:    cisatracurium Infusion: 297 mL    cisatracurium Infusion: 141.4 mL    fentaNYL Infusion.: 1080 mL    Glucerna: 1080 mL    insulin Infusion: 24 mL    propofol Infusion: 571.2 mL    propofol Infusion: 235.2 mL  Total IN: 3428.8 mL    OUT:    Indwelling Catheter - Urethral: 5075 mL    Rectal Tube: 600 mL  Total OUT: 5675 mL    Total NET: -2246.2 mL          Physical exam:  Gen: NAD  Abdomen:  Vascular:  ______________________________________________  LABS:  CBC Full  -  ( 10 Feb 2019 23:25 )  WBC Count : 17.66 K/uL  Hemoglobin : 8.0 g/dL  Hematocrit : 26.4 %  Platelet Count - Automated : 604 K/uL  Mean Cell Volume : 100.8 fL  Mean Cell Hemoglobin : 30.5 pg  Mean Cell Hemoglobin Concentration : 30.3 %  Auto Neutrophil # : 12.27 K/uL  Auto Lymphocyte # : 2.16 K/uL  Auto Monocyte # : 1.44 K/uL  Auto Eosinophil # : 0.14 K/uL  Auto Basophil # : 0.13 K/uL  Auto Neutrophil % : 69.5 %  Auto Lymphocyte % : 12.2 %  Auto Monocyte % : 8.2 %  Auto Eosinophil % : 0.8 %  Auto Basophil % : 0.7 %    02-10    142  |  103  |  32<H>  ----------------------------<  192<H>  3.8   |  22  |  0.69    Ca    8.9      10 Feb 2019 23:25  Phos  3.7     02-10  Mg     2.1     02-10    TPro  6.5  /  Alb  2.7<L>  /  TBili  0.3  /  DBili  x   /  AST  28  /  ALT  21  /  AlkPhos  190<H>  02-10    _____________________________________________  RADIOLOGY:

## 2019-02-11 NOTE — CHART NOTE - NSCHARTNOTEFT_GEN_A_CORE
:  Chadd Alvarado MD PGY3  [facility] Pager #: [phone]  [facility] Pager #: [phone]    INDICATION:  Hypoxic respiratory failure     PROCEDURE:  [x ] LIMITED ECHO  [x] LIMITED CHEST  [ ] LIMITED RETROPERITONEAL  [ ] LIMITED ABDOMINAL  [ ] LIMITED DVT  [ ] NEEDLE GUIDANCE VASCULAR  [ ] NEEDLE GUIDANCE THORACENTESIS  [ ] NEEDLE GUIDANCE PARACENTESIS  [ ] NEEDLE GUIDANCE PERICARDIOCENTESIS  [ ] OTHER    FINDINGS:    Cardiac: Unable to properly assess, poor windows     Lungs: Scattered B-lines, no consolidation    INTERPRETATION:    Resolving atelectasis, but patient remains ventilator dependent :  Chadd Alvarado MD PGY3  [facility] Pager #: [phone]  [facility] Pager #: [phone]    INDICATION:  Hypoxic respiratory failure     PROCEDURE:  [x ] LIMITED ECHO  [x] LIMITED CHEST  [ ] LIMITED RETROPERITONEAL  [ ] LIMITED ABDOMINAL  [ ] LIMITED DVT  [ ] NEEDLE GUIDANCE VASCULAR  [ ] NEEDLE GUIDANCE THORACENTESIS  [ ] NEEDLE GUIDANCE PARACENTESIS  [ ] NEEDLE GUIDANCE PERICARDIOCENTESIS  [ ] OTHER    FINDINGS:    Cardiac: Unable to properly assess, poor windows     Lungs: Scattered B-lines, no consolidation or effusions    INTERPRETATION:    Resolving atelectasis

## 2019-02-11 NOTE — PROGRESS NOTE ADULT - ASSESSMENT
63 year old female with PMHx of HTN, DM, hypothyroidism, RA (prednisone 10mg), pulmonary HTN, CHRIS, and COPD (3L at home) who was originally admitted on 12/15 for SBO s/p ex lap with lysis of adhesions (4 retention sutures) c/b 12/30 she eviscerated 2/2 a coughing episode s/p ex lap (6 retention sutures) c/b multifocal pneumonia, developed hypercapnic respiratory failure requiring intubation 1/6, s/p bronch 1/9/18, and extubated on 1/24, course further c/b Enterococci bacteremia now readmitted to MICU for hypoxic respiratory failure 2/2 atelectasis requiring intubation 01/31    #Neuro  - Sedated   - Continue propofol and fentanyl     #Respiratory   1) Hypoxic Respiratory Failure 2/2 atelectasis requiring intubation 1/31 - now on VDR   - Bronchoscopy showing findings concerning for hyperdynamic airway collapse.   - Patient was deconditioned and unable to sit up and participate with PT leading to a functional shunt   - At this time, will continue ventilatory support   - will diurese w/ lasix 40 IV four times a day ; will keep net negative 2L per day ; will check lytes q6 on BMP to replete as needed especially K   - now dependent on NO; will keep NO and wean down vent settings as pt cannot be extubated on 90% FiO2 but can be extubated on NO     #Cardiovascular  1) Cardiogenic shock 2/2 iatrogenic   - Prior bedside US showing LVOT   - On levophed, will wean off as tolerated   - Holding amlodipine 10mg, furosemide 40mg, and metoprolol 25mg BID    - c/w Simvastatin 20mg     #GI  - SBO s/p ex-lap with findings of healthy bowel c/b evisceration of bowel requiring placement of retention sutures  - NPO with tube feeds   - Continue Protonix  - will f/u with surgery regarding abdomen - as per surgery, c/w BID dressing changes    #Renal/Metabolic  - Monitor for electrolyte derangements ; replete as needed  - trend BMP for BUN/Cr    #ID -now off antibiotics   - Off antibiotics as per discussion w/ ID - had 27 days of Vanc, 3 days of Ceftriaxone, and 8 days of Zosyn for enterococcal bacteremia and Klebsiella sputum culture   - Repeat infectious work up negative: UA, RVP  - C diff negative    #Endocrine  1) Diabetes Mellitus Type 2  - now on insulin gtt for uncontrolled FSG  - Gabapentin 400 TID held for now     2) Hypothyroidism   - Levothyroxine 137     # Rheumatology  1) Rheumatoid arthritis   - c/w 10 IV Solumedrol to approximate 10 mg prednisome, which is pt's home dose     #DVT ppx  - Heparin SQ 7500 TID    # Ethics  GOC discussion ongoing with Palliative Care team. Son (HCP) would like to give the patient time on the ventilatory before making any decisions regarding tracheostomy ; per palliative, daughter will be coming in this week to assist with decision making process 63 year old female with PMHx of HTN, DM, hypothyroidism, RA (prednisone 10mg), pulmonary HTN, CHRIS, and COPD (3L at home) who was originally admitted on 12/15 for SBO s/p ex lap with lysis of adhesions (4 retention sutures) c/b 12/30 she eviscerated 2/2 a coughing episode s/p ex lap (6 retention sutures) c/b multifocal pneumonia, developed hypercapnic respiratory failure requiring intubation 1/6, s/p bronch 1/9/18, and extubated on 1/24, course further c/b Enterococci bacteremia now readmitted to MICU for hypoxic respiratory failure 2/2 atelectasis requiring intubation 01/31    #Neuro  - Sedated   - Continue propofol and fentanyl     #Respiratory   1) Hypoxic Respiratory Failure 2/2 atelectasis requiring intubation 1/31 - now on VDR   - Bronchoscopy showing findings concerning for hyperdynamic airway collapse.   - Patient was deconditioned and unable to sit up and participate with PT leading to a functional shunt   - At this time, will continue ventilatory support   - will diurese w/ lasix 40 IV four times a day ; will keep net negative 2L per day ; will check lytes q6 on BMP to replete as needed especially K   - now dependent on NO; will keep NO and wean down vent settings as pt cannot be extubated on 90% FiO2 but can be extubated on NO     #Cardiovascular  1) Cardiogenic shock 2/2 iatrogenic   - Prior bedside US showing LVOT   - On levophed, will wean off as tolerated   - Holding amlodipine 10mg, furosemide 40mg, and metoprolol 25mg BID    - c/w Simvastatin 20mg     #GI  - SBO s/p ex-lap with findings of healthy bowel c/b evisceration of bowel requiring placement of retention sutures  - NPO with tube feeds   - Continue Protonix  - will f/u with surgery regarding abdomen - as per surgery, c/w BID dressing changes    #Renal/Metabolic  - Monitor for electrolyte derangements ; replete as needed  - trend BMP for BUN/Cr    #ID -now off antibiotics   - Off antibiotics as per discussion w/ ID - had 27 days of Vanc, 3 days of Ceftriaxone, and 8 days of Zosyn for enterococcal bacteremia and Klebsiella sputum culture   - Repeat infectious work up negative: UA, RVP  - C diff negative    #Endocrine  1) Diabetes Mellitus Type 2  - off insulin gtt; now on NPH 24 U q6  - Gabapentin 400 TID held for now     2) Hypothyroidism   - Levothyroxine 137     # Rheumatology  1) Rheumatoid arthritis   - c/w 10 IV Solumedrol to approximate 10 mg prednisome, which is pt's home dose     #DVT ppx  - Heparin SQ 7500 TID    # Ethics  GOC discussion ongoing with Palliative Care team. Son (HCP) would like to give the patient time on the ventilatory before making any decisions regarding tracheostomy ; per palliative, daughter will be coming in this week to assist with decision making process

## 2019-02-11 NOTE — PROGRESS NOTE ADULT - ASSESSMENT
63F w/ multiple respiratory comorbidities p/w SBO s/p ex-lap with findings of healthy bowel c/b evisceration of bowel requiring placement of retention sutures, difficult to wean off ventilation but eventually extubated and downgraded to RCU 01/26; readmitted to MICU due to hypoxic respiratory now reintubated, on NO.     - Continue BID dressing changes to midline wound  - Rest of care per MICU    Pager [phone]

## 2019-02-12 LAB
ALBUMIN SERPL ELPH-MCNC: 2.5 G/DL — LOW (ref 3.3–5)
ALP SERPL-CCNC: 180 U/L — HIGH (ref 40–120)
ALT FLD-CCNC: 21 U/L — SIGNIFICANT CHANGE UP (ref 4–33)
ANION GAP SERPL CALC-SCNC: 18 MMO/L — HIGH (ref 7–14)
ANISOCYTOSIS BLD QL: SIGNIFICANT CHANGE UP
APTT BLD: 40.2 SEC — HIGH (ref 27.5–36.3)
AST SERPL-CCNC: 30 U/L — SIGNIFICANT CHANGE UP (ref 4–32)
BASE EXCESS BLDA CALC-SCNC: 4.3 MMOL/L — SIGNIFICANT CHANGE UP
BASOPHILS # BLD AUTO: 0.15 K/UL — SIGNIFICANT CHANGE UP (ref 0–0.2)
BASOPHILS NFR BLD AUTO: 0.8 % — SIGNIFICANT CHANGE UP (ref 0–2)
BASOPHILS NFR SPEC: 1.8 % — SIGNIFICANT CHANGE UP (ref 0–2)
BILIRUB SERPL-MCNC: 0.3 MG/DL — SIGNIFICANT CHANGE UP (ref 0.2–1.2)
BLASTS # FLD: 0 % — SIGNIFICANT CHANGE UP (ref 0–0)
BUN SERPL-MCNC: 30 MG/DL — HIGH (ref 7–23)
CALCIUM SERPL-MCNC: 8.7 MG/DL — SIGNIFICANT CHANGE UP (ref 8.4–10.5)
CHLORIDE BLDA-SCNC: 106 MMOL/L — SIGNIFICANT CHANGE UP (ref 96–108)
CHLORIDE SERPL-SCNC: 99 MMOL/L — SIGNIFICANT CHANGE UP (ref 98–107)
CO2 SERPL-SCNC: 25 MMOL/L — SIGNIFICANT CHANGE UP (ref 22–31)
CREAT SERPL-MCNC: 0.64 MG/DL — SIGNIFICANT CHANGE UP (ref 0.5–1.3)
EOSINOPHIL # BLD AUTO: 1.13 K/UL — HIGH (ref 0–0.5)
EOSINOPHIL NFR BLD AUTO: 6.1 % — HIGH (ref 0–6)
EOSINOPHIL NFR FLD: 1.8 % — SIGNIFICANT CHANGE UP (ref 0–6)
GIANT PLATELETS BLD QL SMEAR: PRESENT — SIGNIFICANT CHANGE UP
GLUCOSE BLDA-MCNC: 237 MG/DL — HIGH (ref 70–99)
GLUCOSE BLDC GLUCOMTR-MCNC: 135 MG/DL — HIGH (ref 70–99)
GLUCOSE BLDC GLUCOMTR-MCNC: 137 MG/DL — HIGH (ref 70–99)
GLUCOSE BLDC GLUCOMTR-MCNC: 144 MG/DL — HIGH (ref 70–99)
GLUCOSE BLDC GLUCOMTR-MCNC: 235 MG/DL — HIGH (ref 70–99)
GLUCOSE BLDC GLUCOMTR-MCNC: 272 MG/DL — HIGH (ref 70–99)
GLUCOSE SERPL-MCNC: 130 MG/DL — HIGH (ref 70–99)
HCO3 BLDA-SCNC: 28 MMOL/L — HIGH (ref 22–26)
HCT VFR BLD CALC: 25.9 % — LOW (ref 34.5–45)
HCT VFR BLDA CALC: 25.3 % — LOW (ref 34.5–46.5)
HGB BLD-MCNC: 7.7 G/DL — LOW (ref 11.5–15.5)
HGB BLDA-MCNC: 8.1 G/DL — LOW (ref 11.5–15.5)
IMM GRANULOCYTES NFR BLD AUTO: 7.8 % — HIGH (ref 0–1.5)
INR BLD: 1.18 — HIGH (ref 0.88–1.17)
LACTATE BLDA-SCNC: 1.5 MMOL/L — SIGNIFICANT CHANGE UP (ref 0.5–2)
LYMPHOCYTES # BLD AUTO: 16.9 % — SIGNIFICANT CHANGE UP (ref 13–44)
LYMPHOCYTES # BLD AUTO: 3.13 K/UL — SIGNIFICANT CHANGE UP (ref 1–3.3)
LYMPHOCYTES NFR SPEC AUTO: 11.6 % — LOW (ref 13–44)
MACROCYTES BLD QL: SIGNIFICANT CHANGE UP
MAGNESIUM SERPL-MCNC: 1.9 MG/DL — SIGNIFICANT CHANGE UP (ref 1.6–2.6)
MCHC RBC-ENTMCNC: 29.7 % — LOW (ref 32–36)
MCHC RBC-ENTMCNC: 30.4 PG — SIGNIFICANT CHANGE UP (ref 27–34)
MCV RBC AUTO: 102.4 FL — HIGH (ref 80–100)
METAMYELOCYTES # FLD: 2.7 % — HIGH (ref 0–1)
MONOCYTES # BLD AUTO: 1.58 K/UL — HIGH (ref 0–0.9)
MONOCYTES NFR BLD AUTO: 8.5 % — SIGNIFICANT CHANGE UP (ref 2–14)
MONOCYTES NFR BLD: 6.2 % — SIGNIFICANT CHANGE UP (ref 2–9)
MYELOCYTES NFR BLD: 2.7 % — HIGH (ref 0–0)
NEUTROPHIL AB SER-ACNC: 72.3 % — SIGNIFICANT CHANGE UP (ref 43–77)
NEUTROPHILS # BLD AUTO: 11.1 K/UL — HIGH (ref 1.8–7.4)
NEUTROPHILS NFR BLD AUTO: 59.9 % — SIGNIFICANT CHANGE UP (ref 43–77)
NEUTS BAND # BLD: 0.9 % — SIGNIFICANT CHANGE UP (ref 0–6)
NRBC # BLD: 9 /100WBC — SIGNIFICANT CHANGE UP
NRBC # FLD: 1.7 K/UL — LOW (ref 25–125)
NRBC FLD-RTO: 9.2 — SIGNIFICANT CHANGE UP
OTHER - HEMATOLOGY %: 0 — SIGNIFICANT CHANGE UP
PCO2 BLDA: 42 MMHG — SIGNIFICANT CHANGE UP (ref 32–48)
PH BLDA: 7.44 PH — SIGNIFICANT CHANGE UP (ref 7.35–7.45)
PHOSPHATE SERPL-MCNC: 2.9 MG/DL — SIGNIFICANT CHANGE UP (ref 2.5–4.5)
PLATELET # BLD AUTO: 619 K/UL — HIGH (ref 150–400)
PLATELET COUNT - ESTIMATE: SIGNIFICANT CHANGE UP
PMV BLD: 10.8 FL — SIGNIFICANT CHANGE UP (ref 7–13)
PO2 BLDA: 60 MMHG — LOW (ref 83–108)
POIKILOCYTOSIS BLD QL AUTO: SLIGHT — SIGNIFICANT CHANGE UP
POLYCHROMASIA BLD QL SMEAR: SIGNIFICANT CHANGE UP
POTASSIUM BLDA-SCNC: 3.5 MMOL/L — SIGNIFICANT CHANGE UP (ref 3.4–4.5)
POTASSIUM SERPL-MCNC: 3.3 MMOL/L — LOW (ref 3.5–5.3)
POTASSIUM SERPL-SCNC: 3.3 MMOL/L — LOW (ref 3.5–5.3)
PROMYELOCYTES # FLD: 0 % — SIGNIFICANT CHANGE UP (ref 0–0)
PROT SERPL-MCNC: 6.2 G/DL — SIGNIFICANT CHANGE UP (ref 6–8.3)
PROTHROM AB SERPL-ACNC: 13.2 SEC — HIGH (ref 9.8–13.1)
RBC # BLD: 2.53 M/UL — LOW (ref 3.8–5.2)
RBC # FLD: 21.2 % — HIGH (ref 10.3–14.5)
SAO2 % BLDA: 89.3 % — LOW (ref 95–99)
SMUDGE CELLS # BLD: PRESENT — SIGNIFICANT CHANGE UP
SODIUM BLDA-SCNC: 142 MMOL/L — SIGNIFICANT CHANGE UP (ref 136–146)
SODIUM SERPL-SCNC: 142 MMOL/L — SIGNIFICANT CHANGE UP (ref 135–145)
VARIANT LYMPHS # BLD: 0 % — SIGNIFICANT CHANGE UP
WBC # BLD: 18.53 K/UL — HIGH (ref 3.8–10.5)
WBC # FLD AUTO: 18.53 K/UL — HIGH (ref 3.8–10.5)

## 2019-02-12 PROCEDURE — 93308 TTE F-UP OR LMTD: CPT | Mod: 26,GC

## 2019-02-12 PROCEDURE — 99291 CRITICAL CARE FIRST HOUR: CPT | Mod: 25

## 2019-02-12 RX ORDER — FUROSEMIDE 40 MG
40 TABLET ORAL EVERY 12 HOURS
Qty: 0 | Refills: 0 | Status: DISCONTINUED | OUTPATIENT
Start: 2019-02-12 | End: 2019-02-17

## 2019-02-12 RX ADMIN — PROPOFOL 33.66 MICROGRAM(S)/KG/MIN: 10 INJECTION, EMULSION INTRAVENOUS at 22:27

## 2019-02-12 RX ADMIN — CISATRACURIUM BESYLATE 20.2 MICROGRAM(S)/KG/MIN: 2 INJECTION INTRAVENOUS at 12:28

## 2019-02-12 RX ADMIN — PANTOPRAZOLE SODIUM 40 MILLIGRAM(S): 20 TABLET, DELAYED RELEASE ORAL at 12:34

## 2019-02-12 RX ADMIN — Medication 20 MILLIGRAM(S): at 13:42

## 2019-02-12 RX ADMIN — PROPOFOL 33.66 MICROGRAM(S)/KG/MIN: 10 INJECTION, EMULSION INTRAVENOUS at 12:30

## 2019-02-12 RX ADMIN — Medication 1 APPLICATION(S): at 18:01

## 2019-02-12 RX ADMIN — Medication 137 MICROGRAM(S): at 05:24

## 2019-02-12 RX ADMIN — HUMAN INSULIN 28 UNIT(S): 100 INJECTION, SUSPENSION SUBCUTANEOUS at 00:25

## 2019-02-12 RX ADMIN — SIMVASTATIN 20 MILLIGRAM(S): 20 TABLET, FILM COATED ORAL at 22:29

## 2019-02-12 RX ADMIN — CHLORHEXIDINE GLUCONATE 15 MILLILITER(S): 213 SOLUTION TOPICAL at 05:22

## 2019-02-12 RX ADMIN — HUMAN INSULIN 28 UNIT(S): 100 INJECTION, SUSPENSION SUBCUTANEOUS at 23:26

## 2019-02-12 RX ADMIN — FENTANYL CITRATE 44.88 MICROGRAM(S)/KG/HR: 50 INJECTION INTRAVENOUS at 22:28

## 2019-02-12 RX ADMIN — PROPOFOL 33.66 MICROGRAM(S)/KG/MIN: 10 INJECTION, EMULSION INTRAVENOUS at 18:01

## 2019-02-12 RX ADMIN — CHLORHEXIDINE GLUCONATE 1 APPLICATION(S): 213 SOLUTION TOPICAL at 12:34

## 2019-02-12 RX ADMIN — Medication 20 MILLIGRAM(S): at 05:22

## 2019-02-12 RX ADMIN — CISATRACURIUM BESYLATE 20.2 MICROGRAM(S)/KG/MIN: 2 INJECTION INTRAVENOUS at 22:27

## 2019-02-12 RX ADMIN — HEPARIN SODIUM 7500 UNIT(S): 5000 INJECTION INTRAVENOUS; SUBCUTANEOUS at 05:21

## 2019-02-12 RX ADMIN — Medication 1 APPLICATION(S): at 05:22

## 2019-02-12 RX ADMIN — Medication 10 MILLIGRAM(S): at 05:22

## 2019-02-12 RX ADMIN — HEPARIN SODIUM 7500 UNIT(S): 5000 INJECTION INTRAVENOUS; SUBCUTANEOUS at 13:42

## 2019-02-12 RX ADMIN — Medication 6: at 12:46

## 2019-02-12 RX ADMIN — FENTANYL CITRATE 44.88 MICROGRAM(S)/KG/HR: 50 INJECTION INTRAVENOUS at 12:30

## 2019-02-12 RX ADMIN — Medication 1 APPLICATION(S): at 13:42

## 2019-02-12 RX ADMIN — Medication 40 MILLIGRAM(S): at 05:23

## 2019-02-12 RX ADMIN — HEPARIN SODIUM 7500 UNIT(S): 5000 INJECTION INTRAVENOUS; SUBCUTANEOUS at 22:25

## 2019-02-12 RX ADMIN — Medication 20 MILLIGRAM(S): at 22:29

## 2019-02-12 RX ADMIN — Medication 4: at 18:03

## 2019-02-12 RX ADMIN — FENTANYL CITRATE 44.88 MICROGRAM(S)/KG/HR: 50 INJECTION INTRAVENOUS at 18:01

## 2019-02-12 RX ADMIN — HUMAN INSULIN 28 UNIT(S): 100 INJECTION, SUSPENSION SUBCUTANEOUS at 18:09

## 2019-02-12 RX ADMIN — HUMAN INSULIN 28 UNIT(S): 100 INJECTION, SUSPENSION SUBCUTANEOUS at 12:47

## 2019-02-12 RX ADMIN — CHLORHEXIDINE GLUCONATE 15 MILLILITER(S): 213 SOLUTION TOPICAL at 18:01

## 2019-02-12 RX ADMIN — CISATRACURIUM BESYLATE 20.2 MICROGRAM(S)/KG/MIN: 2 INJECTION INTRAVENOUS at 18:03

## 2019-02-12 RX ADMIN — Medication 40 MILLIGRAM(S): at 18:01

## 2019-02-12 RX ADMIN — HUMAN INSULIN 28 UNIT(S): 100 INJECTION, SUSPENSION SUBCUTANEOUS at 05:24

## 2019-02-12 NOTE — PROGRESS NOTE ADULT - SUBJECTIVE AND OBJECTIVE BOX
CHIEF COMPLAINT:    Interval Events:    REVIEW OF SYSTEMS:  Constitutional: [ ] negative [ ] fevers [ ] chills [ ] weight loss [ ] weight gain  HEENT: [ ] negative [ ] dry eyes [ ] eye irritation [ ] postnasal drip [ ] nasal congestion  CV: [ ] negative  [ ] chest pain [ ] orthopnea [ ] palpitations [ ] murmur  Resp: [ ] negative [ ] cough [ ] shortness of breath [ ] dyspnea [ ] wheezing [ ] sputum [ ] hemoptysis  GI: [ ] negative [ ] nausea [ ] vomiting [ ] diarrhea [ ] constipation [ ] abd pain [ ] dysphagia   : [ ] negative [ ] dysuria [ ] nocturia [ ] hematuria [ ] increased urinary frequency  Musculoskeletal: [ ] negative [ ] back pain [ ] myalgias [ ] arthralgias [ ] fracture  Skin: [ ] negative [ ] rash [ ] itch  Neurological: [ ] negative [ ] headache [ ] dizziness [ ] syncope [ ] weakness [ ] numbness  Psychiatric: [ ] negative [ ] anxiety [ ] depression  Endocrine: [ ] negative [ ] diabetes [ ] thyroid problem  Hematologic/Lymphatic: [ ] negative [ ] anemia [ ] bleeding problem  Allergic/Immunologic: [ ] negative [ ] itchy eyes [ ] nasal discharge [ ] hives [ ] angioedema  [ ] All other systems negative  [ ] Unable to assess ROS because ________    OBJECTIVE:  ICU Vital Signs Last 24 Hrs  T(C): 36.9 (12 Feb 2019 04:00), Max: 36.9 (12 Feb 2019 04:00)  T(F): 98.4 (12 Feb 2019 04:00), Max: 98.4 (12 Feb 2019 04:00)  HR: 99 (12 Feb 2019 06:34) (84 - 112)  BP: 113/75 (12 Feb 2019 06:00) (83/53 - 182/98)  BP(mean): 85 (12 Feb 2019 06:00) (63 - 124)  ABP: --  ABP(mean): --  RR: 14 (12 Feb 2019 06:00) (14 - 16)  SpO2: 90% (12 Feb 2019 06:34) (84% - 97%)    Mode: standby, RR (machine): 15, FiO2: 70, ITime: 2, MAP: 25, PIP: 33, Frequency: 650    02-11 @ 07:01  -  02-12 @ 07:00  --------------------------------------------------------  IN: 3824 mL / OUT: 5770 mL / NET: -1946 mL      CAPILLARY BLOOD GLUCOSE      POCT Blood Glucose.: 135 mg/dL (12 Feb 2019 05:20)      PHYSICAL EXAM:  General: NAD  HEENT: NC/AT; PERRL, clear conjunctiva  Neck: supple  Respiratory: CTA b/l  Cardiovascular: +S1/S2; RRR  Abdomen: soft, NT/ND; +BS x4, L-sided abdominal dressing  Extremities: WWP, 2+ peripheral pulses b/l; anasarca reduced compared to yesterday  Skin: normal color and turgor; no rash  Neurological: unable to assess given intubation/sedation     LINES:    HOSPITAL MEDICATIONS:  Standing Meds:  chlorhexidine 0.12% Liquid 15 milliLiter(s) Swish and Spit every 12 hours  chlorhexidine 4% Liquid 1 Application(s) Topical <User Schedule>  cisatracurium Infusion 3 MICROgram(s)/kG/Min IV Continuous <Continuous>  collagenase Ointment 1 Application(s) Topical daily  dextrose 5%. 1000 milliLiter(s) IV Continuous <Continuous>  dextrose 50% Injectable 12.5 Gram(s) IV Push once  dextrose 50% Injectable 25 Gram(s) IV Push once  dextrose 50% Injectable 25 Gram(s) IV Push once  fentaNYL   Infusion. 4 MICROgram(s)/kG/Hr IV Continuous <Continuous>  furosemide   Injectable 40 milliGRAM(s) IV Push every 8 hours  heparin  Injectable 7500 Unit(s) SubCutaneous every 8 hours  influenza   Vaccine 0.5 milliLiter(s) IntraMuscular once  insulin lispro (HumaLOG) corrective regimen sliding scale   SubCutaneous every 6 hours  insulin NPH human recombinant 28 Unit(s) SubCutaneous every 6 hours  levothyroxine 137 MICROGram(s) Oral daily  methylPREDNISolone sodium succinate Injectable 10 milliGRAM(s) IV Push daily  norepinephrine Infusion 0.05 MICROgram(s)/kG/Min IV Continuous <Continuous>  pantoprazole  Injectable 40 milliGRAM(s) IV Push daily  petrolatum Ophthalmic Ointment 1 Application(s) Both EYES every 12 hours  propofol Infusion 50 MICROgram(s)/kG/Min IV Continuous <Continuous>  sildenafil (REVATIO) 20 milliGRAM(s) Oral three times a day  simvastatin 20 milliGRAM(s) Oral at bedtime      PRN Meds:  acetaminophen    Suspension .. 650 milliGRAM(s) Oral every 6 hours PRN  dextrose 40% Gel 15 Gram(s) Oral once PRN  glucagon  Injectable 1 milliGRAM(s) IntraMuscular once PRN      LABS:                        7.7    18.53 )-----------( 619      ( 12 Feb 2019 02:23 )             25.9     Hgb Trend: 7.7<--, 8.0<--, 7.2<--, 7.1<--, 7.0<--  02-12    142  |  99  |  30<H>  ----------------------------<  130<H>  3.3<L>   |  25  |  0.64    Ca    8.7      12 Feb 2019 02:23  Phos  2.9     02-12  Mg     1.9     02-12    TPro  6.2  /  Alb  2.5<L>  /  TBili  0.3  /  DBili  x   /  AST  30  /  ALT  21  /  AlkPhos  180<H>  02-12    Creatinine Trend: 0.64<--, 0.69<--, 0.74<--, 0.75<--, 0.80<--, 0.77<--  PT/INR - ( 12 Feb 2019 02:23 )   PT: 13.2 SEC;   INR: 1.18          PTT - ( 12 Feb 2019 02:23 )  PTT:40.2 SEC    Arterial Blood Gas:  02-11 @ 11:00  7.44/41/58/28/88.3/3.6  ABG lactate: 1.8  Arterial Blood Gas:  02-10 @ 23:25  7.45/37/64/26/91.2/1.6  ABG lactate: 2.0  Arterial Blood Gas:  02-10 @ 14:35  7.39/40/64/24/90.8/-0.8  ABG lactate: 1.8    Venous Blood Gas:  02-11 @ 21:30  7.43/47/34/29/55.6  VBG Lactate: 2.5 CHIEF COMPLAINT:    Interval Events: no over night events    REVIEW OF SYSTEMS:  [x ] Unable to assess ROS because intubated/sedated    OBJECTIVE:  ICU Vital Signs Last 24 Hrs  T(C): 36.9 (12 Feb 2019 04:00), Max: 36.9 (12 Feb 2019 04:00)  T(F): 98.4 (12 Feb 2019 04:00), Max: 98.4 (12 Feb 2019 04:00)  HR: 99 (12 Feb 2019 06:34) (84 - 112)  BP: 113/75 (12 Feb 2019 06:00) (83/53 - 182/98)  BP(mean): 85 (12 Feb 2019 06:00) (63 - 124)  RR: 14 (12 Feb 2019 06:00) (14 - 16)  SpO2: 90% (12 Feb 2019 06:34) (84% - 97%)    Mode: standby, RR (machine): 15, FiO2: 70, ITime: 2, MAP: 25, PIP: 33, Frequency: 650    02-11 @ 07:01  -  02-12 @ 07:00  --------------------------------------------------------  IN: 3824 mL / OUT: 5770 mL / NET: -1946 mL      CAPILLARY BLOOD GLUCOSE      POCT Blood Glucose.: 135 mg/dL (12 Feb 2019 05:20)      PHYSICAL EXAM:  General: NAD  HEENT: NC/AT; PERRL, clear conjunctiva  Neck: supple  Respiratory: CTA b/l  Cardiovascular: +S1/S2; RRR  Abdomen: soft, NT/ND; +BS x4, L-sided abdominal dressing  Extremities: WWP, 2+ peripheral pulses b/l; anasarca reduced compared to yesterday  Skin: normal color and turgor; no rash  Neurological: unable to assess given intubation/sedation     LINES:    HOSPITAL MEDICATIONS:  Standing Meds:  chlorhexidine 0.12% Liquid 15 milliLiter(s) Swish and Spit every 12 hours  chlorhexidine 4% Liquid 1 Application(s) Topical <User Schedule>  cisatracurium Infusion 3 MICROgram(s)/kG/Min IV Continuous <Continuous>  collagenase Ointment 1 Application(s) Topical daily  dextrose 5%. 1000 milliLiter(s) IV Continuous <Continuous>  dextrose 50% Injectable 12.5 Gram(s) IV Push once  dextrose 50% Injectable 25 Gram(s) IV Push once  dextrose 50% Injectable 25 Gram(s) IV Push once  fentaNYL   Infusion. 4 MICROgram(s)/kG/Hr IV Continuous <Continuous>  furosemide   Injectable 40 milliGRAM(s) IV Push every 8 hours  heparin  Injectable 7500 Unit(s) SubCutaneous every 8 hours  influenza   Vaccine 0.5 milliLiter(s) IntraMuscular once  insulin lispro (HumaLOG) corrective regimen sliding scale   SubCutaneous every 6 hours  insulin NPH human recombinant 28 Unit(s) SubCutaneous every 6 hours  levothyroxine 137 MICROGram(s) Oral daily  methylPREDNISolone sodium succinate Injectable 10 milliGRAM(s) IV Push daily  norepinephrine Infusion 0.05 MICROgram(s)/kG/Min IV Continuous <Continuous>  pantoprazole  Injectable 40 milliGRAM(s) IV Push daily  petrolatum Ophthalmic Ointment 1 Application(s) Both EYES every 12 hours  propofol Infusion 50 MICROgram(s)/kG/Min IV Continuous <Continuous>  sildenafil (REVATIO) 20 milliGRAM(s) Oral three times a day  simvastatin 20 milliGRAM(s) Oral at bedtime      PRN Meds:  acetaminophen    Suspension .. 650 milliGRAM(s) Oral every 6 hours PRN  dextrose 40% Gel 15 Gram(s) Oral once PRN  glucagon  Injectable 1 milliGRAM(s) IntraMuscular once PRN      LABS:                        7.7    18.53 )-----------( 619      ( 12 Feb 2019 02:23 )             25.9     Hgb Trend: 7.7<--, 8.0<--, 7.2<--, 7.1<--, 7.0<--  02-12    142  |  99  |  30<H>  ----------------------------<  130<H>  3.3<L>   |  25  |  0.64    Ca    8.7      12 Feb 2019 02:23  Phos  2.9     02-12  Mg     1.9     02-12    TPro  6.2  /  Alb  2.5<L>  /  TBili  0.3  /  DBili  x   /  AST  30  /  ALT  21  /  AlkPhos  180<H>  02-12    Creatinine Trend: 0.64<--, 0.69<--, 0.74<--, 0.75<--, 0.80<--, 0.77<--  PT/INR - ( 12 Feb 2019 02:23 )   PT: 13.2 SEC;   INR: 1.18          PTT - ( 12 Feb 2019 02:23 )  PTT:40.2 SEC    Arterial Blood Gas:  02-11 @ 11:00  7.44/41/58/28/88.3/3.6  ABG lactate: 1.8  Arterial Blood Gas:  02-10 @ 23:25  7.45/37/64/26/91.2/1.6  ABG lactate: 2.0  Arterial Blood Gas:  02-10 @ 14:35  7.39/40/64/24/90.8/-0.8  ABG lactate: 1.8    Venous Blood Gas:  02-11 @ 21:30  7.43/47/34/29/55.6  VBG Lactate: 2.5

## 2019-02-12 NOTE — PROGRESS NOTE ADULT - ATTENDING COMMENTS
63 year old female with HTN, DM, hypothyroidism, RA, pulmonary HTN, CHRIS, and COPD (3L at home) originally admitted on 12/15 for SBO s/p ex lap with lysis of adhesions, 12/30 she eviscerated 2/2 a coughing episode had to go back to OR, c/b multifocal pneumonia, enterococcus bacteremia, hypercapnic respiratory intubated 1/6, extubated on 1/24, readmitted to MICU with hypoxic respiratory failure requiring intubation     - currently on VDR and nitric oxide remains with profound hypoxia  - remains sedated, paralyzed and on vasopressors   - afebrile overnight  - good urine output with stable creatinine  - continue diuresis attempts to keep net negative about 1.5 liters  - started on Sildenafil for pulmonary hypertension, systemic BP stable will attempt to wean nitric oxide    profound hypoxemic respiratory failure not improving with multiple interventions. Prognosis guarded    critical care time 40 minutes

## 2019-02-12 NOTE — PROGRESS NOTE ADULT - SUBJECTIVE AND OBJECTIVE BOX
Surgery Progress Note    S: Patient seen and examined. No acute events overnight. Off pressors. Sedation/pain control with fentanyl & propofol gtt.     O:  Vital Signs Last 24 Hrs  T(C): 36.9 (12 Feb 2019 04:00), Max: 36.9 (12 Feb 2019 04:00)  T(F): 98.4 (12 Feb 2019 04:00), Max: 98.4 (12 Feb 2019 04:00)  HR: 98 (12 Feb 2019 09:09) (84 - 112)  BP: 130/84 (12 Feb 2019 08:00) (83/53 - 182/98)  BP(mean): 98 (12 Feb 2019 08:00) (63 - 124)  RR: 14 (12 Feb 2019 08:00) (14 - 16)  SpO2: 93% (12 Feb 2019 09:09) (84% - 97%)    I&O's Detail    11 Feb 2019 07:01  -  12 Feb 2019 07:00  --------------------------------------------------------  IN:    cisatracurium Infusion: 654 mL    Enteral Tube Flush: 300 mL    fentaNYL Infusion.: 360 mL    fentaNYL Infusion.: 717.8 mL    Glucerna: 960 mL    norepinephrine Infusion: 21 mL    propofol Infusion: 539.2 mL    propofol Infusion: 272 mL  Total IN: 3824 mL    OUT:    Indwelling Catheter - Urethral: 5770 mL  Total OUT: 5770 mL    Total NET: -1946 mL      12 Feb 2019 07:01  -  12 Feb 2019 09:49  --------------------------------------------------------  IN:  Total IN: 0 mL    OUT:    Indwelling Catheter - Urethral: 375 mL  Total OUT: 375 mL    Total NET: -375 mL          MEDICATIONS  (STANDING):  chlorhexidine 0.12% Liquid 15 milliLiter(s) Swish and Spit every 12 hours  chlorhexidine 4% Liquid 1 Application(s) Topical <User Schedule>  cisatracurium Infusion 3 MICROgram(s)/kG/Min (20.196 mL/Hr) IV Continuous <Continuous>  collagenase Ointment 1 Application(s) Topical daily  dextrose 5%. 1000 milliLiter(s) (50 mL/Hr) IV Continuous <Continuous>  dextrose 50% Injectable 12.5 Gram(s) IV Push once  dextrose 50% Injectable 25 Gram(s) IV Push once  dextrose 50% Injectable 25 Gram(s) IV Push once  fentaNYL   Infusion. 4 MICROgram(s)/kG/Hr (44.88 mL/Hr) IV Continuous <Continuous>  furosemide   Injectable 40 milliGRAM(s) IV Push every 12 hours  heparin  Injectable 7500 Unit(s) SubCutaneous every 8 hours  influenza   Vaccine 0.5 milliLiter(s) IntraMuscular once  insulin lispro (HumaLOG) corrective regimen sliding scale   SubCutaneous every 6 hours  insulin NPH human recombinant 28 Unit(s) SubCutaneous every 6 hours  levothyroxine 137 MICROGram(s) Oral daily  methylPREDNISolone sodium succinate Injectable 10 milliGRAM(s) IV Push daily  pantoprazole  Injectable 40 milliGRAM(s) IV Push daily  petrolatum Ophthalmic Ointment 1 Application(s) Both EYES every 12 hours  propofol Infusion 50 MICROgram(s)/kG/Min (33.66 mL/Hr) IV Continuous <Continuous>  sildenafil (REVATIO) 20 milliGRAM(s) Oral three times a day  simvastatin 20 milliGRAM(s) Oral at bedtime    MEDICATIONS  (PRN):  acetaminophen    Suspension .. 650 milliGRAM(s) Oral every 6 hours PRN Temp greater or equal to 38C (100.4F), Mild Pain (1 - 3), Moderate Pain (4 - 6)  dextrose 40% Gel 15 Gram(s) Oral once PRN Blood Glucose LESS THAN 70 milliGRAM(s)/deciliter  glucagon  Injectable 1 milliGRAM(s) IntraMuscular once PRN Glucose LESS THAN 70 milligrams/deciliter                            7.7    18.53 )-----------( 619      ( 12 Feb 2019 02:23 )             25.9       02-12    142  |  99  |  30<H>  ----------------------------<  130<H>  3.3<L>   |  25  |  0.64    Ca    8.7      12 Feb 2019 02:23  Phos  2.9     02-12  Mg     1.9     02-12    TPro  6.2  /  Alb  2.5<L>  /  TBili  0.3  /  DBili  x   /  AST  30  /  ALT  21  /  AlkPhos  180<H>  02-12      Physical Exam:  Gen: Laying in bed, sedated  Resp: intubated  Abd: soft, NTND. Dressing c/d/i  Ext: WWP  Skin: No rashes

## 2019-02-12 NOTE — PROGRESS NOTE ADULT - ASSESSMENT
63F w/ multiple respiratory comorbidities p/w SBO s/p ex-lap with findings of healthy bowel c/b evisceration of bowel requiring placement of retention sutures, difficult to wean off ventilation but eventually extubated and downgraded to RCU 01/26; readmitted to MICU due to hypoxic respiratory now reintubated, on NO.     - Continue BID dressing changes to midline wound  - Rest of care per MICU    Avery Gonzalez, PGY-2  B Team Surgery [id number]

## 2019-02-12 NOTE — PROGRESS NOTE ADULT - ASSESSMENT
63 year old female with PMHx of HTN, DM, hypothyroidism, RA (prednisone 10mg), pulmonary HTN, CHRIS, and COPD (3L at home) who was originally admitted on 12/15 for SBO s/p ex lap with lysis of adhesions (4 retention sutures) c/b 12/30 she eviscerated 2/2 a coughing episode s/p ex lap (6 retention sutures) c/b multifocal pneumonia, developed hypercapnic respiratory failure requiring intubation 1/6, s/p bronch 1/9/18, and extubated on 1/24, course further c/b Enterococci bacteremia now readmitted to MICU for hypoxic respiratory failure 2/2 atelectasis requiring intubation 01/31    #Neuro  - Sedated   - Continue propofol and fentanyl     #Respiratory   1) Hypoxic Respiratory Failure 2/2 atelectasis requiring intubation 1/31 - now on VDR   -Bronchoscopy showing findings concerning for hyperdynamic airway collapse.   - Patient was deconditioned and unable to sit up and participate with PT leading to a functional shunt   - At this time, will continue ventilatory support   - will diurese w/ lasix 40 IV three times a day ; will keep net negative 1-2L per day ; will check lytes q8 on BMP to replete as needed especially K   - now dependent on NO; will keep NO and wean down vent settings as pt cannot be extubated on 90% FiO2 but can be extubated on NO     #Cardiovascular  1) Cardiogenic shock 2/2 iatrogenic   - Prior bedside US showing LVOT   - On levophed, will wean off as tolerated   - Holding amlodipine 10mg, furosemide 40mg, and metoprolol 25mg BID    - c/w Simvastatin 20mg     #GI  - SBO s/p ex-lap with findings of healthy bowel c/b evisceration of bowel requiring placement of retention sutures  - NPO with tube feeds   - Continue Protonix  - will f/u with surgery regarding abdomen    #Renal/Metabolic  - Monitor for electrolyte derangements ; replete as needed  - trend BMP for BUN/Cr    #ID   - Off antibiotics as per discussion w/ ID - had 27 days of Vanc, 3 days of Ceftriaxone, and 8 days of Zosyn for enterococcal bacteremia and Klebsiella sputum culture   - Repeat infectious work up negative: UA, RVP  - C diff negative    #Endocrine  1) Diabetes Mellitus Type 2  - now on insulin gtt for uncontrolled FSG  - Gabapentin 400 TID held for now     2) Hypothyroidism   - Levothyroxine 137     # Rheumatology  1) Rheumatoid arthritis   - will dc solumedrol and resume home prednisone dose     #DVT ppx  - Heparin SQ     # Ethics  GOC discussion ongoing with Palliative Care team. Son (HCP) would like to give the patient time on the ventilatory before making any decisions regarding tracheostomy ; per palliative, daughter will be coming in this week to assist with decision making process 63 year old female with PMHx of HTN, DM, hypothyroidism, RA (prednisone 10mg), pulmonary HTN, CHRIS, and COPD (3L at home) who was originally admitted on 12/15 for SBO s/p ex lap with lysis of adhesions (4 retention sutures) c/b 12/30 she eviscerated 2/2 a coughing episode s/p ex lap (6 retention sutures) c/b multifocal pneumonia, developed hypercapnic respiratory failure requiring intubation 1/6, s/p bronch 1/9/18, and extubated on 1/24, course further c/b Enterococci bacteremia now readmitted to MICU for hypoxic respiratory failure 2/2 atelectasis requiring intubation 01/31    #Neuro  - Sedated   - Continue propofol and fentanyl     #Respiratory   1) Hypoxic Respiratory Failure 2/2 atelectasis requiring intubation 1/31 - now on VDR   -Bronchoscopy showing findings concerning for hyperdynamic airway collapse.   - Patient was deconditioned and unable to sit up and participate with PT leading to a functional shunt   - At this time, will continue ventilatory support   - will diurese w/ lasix 40 IV BID ; will keep net negative 1-2L per day ; will check lytes q12 on BMP to replete as needed especially K   - now dependent on NO; started Sildenafil 20 TID - BPs tolerating; will de-escalate NO over night and will switch off of VDR at this time   - will get daily ABG    #Cardiovascular  1) Cardiogenic shock 2/2 iatrogenic   - Prior bedside US showing LVOT; currently not concerned for LVOT on most recent POCUS  - On levophed, will wean off as tolerated   - Holding amlodipine 10mg, furosemide 40mg, and metoprolol 25mg BID    - c/w Simvastatin 20mg     #GI  - SBO s/p ex-lap with findings of healthy bowel c/b evisceration of bowel requiring placement of retention sutures  - NPO with tube feeds   - Continue Protonix  - will f/u with surgery regarding abdomen    #Renal/Metabolic  - Monitor for electrolyte derangements in the setting of diuresis ; replete as needed  - trend BMP for BUN/Cr    #ID   - Off antibiotics as per discussion w/ ID - had 27 days of Vanc, 3 days of Ceftriaxone, and 8 days of Zosyn for enterococcal bacteremia and Klebsiella sputum culture   - Repeat infectious work up negative: UA, RVP  - C diff negative    #Endocrine  1) Diabetes Mellitus Type 2  - now on insulin gtt for uncontrolled FSG  - Gabapentin 400 TID held for now     2) Hypothyroidism   - Levothyroxine 137     # Rheumatology  1) Rheumatoid arthritis   - will dc solumedrol and resume home prednisone dose     #DVT ppx  - Heparin SQ     # Ethics  GOC discussion ongoing with Palliative Care team. Son (HCP) would like to give the patient time on the ventilatory before making any decisions regarding tracheostomy ; per palliative, daughter will be coming in this week to assist with decision making process

## 2019-02-12 NOTE — CHART NOTE - NSCHARTNOTEFT_GEN_A_CORE
:    Chadd Alvarado MD PGY3  [facility] Pager #: [phone]  [facility] Pager #: [phone]    INDICATION:    Hypoxic respiratory failure     PROCEDURE:  [x ] LIMITED ECHO  [ ] LIMITED CHEST  [ ] LIMITED RETROPERITONEAL  [ ] LIMITED ABDOMINAL  [ ] LIMITED DVT  [ ] NEEDLE GUIDANCE VASCULAR  [ ] NEEDLE GUIDANCE THORACENTESIS  [ ] NEEDLE GUIDANCE PARACENTESIS  [ ] NEEDLE GUIDANCE PERICARDIOCENTESIS  [ ] OTHER    FINDINGS:    Cardiac: Small RV with poor contractility       INTERPRETATION:    Signs of known history of pulmonary hypertension :    Chadd Alvarado MD PGY3  [facility] Pager #: [phone]  [facility] Pager #: [phone]    INDICATION:    Hypoxic respiratory failure     PROCEDURE:  [x ] LIMITED ECHO  [ ] LIMITED CHEST  [ ] LIMITED RETROPERITONEAL  [ ] LIMITED ABDOMINAL  [ ] LIMITED DVT  [ ] NEEDLE GUIDANCE VASCULAR  [ ] NEEDLE GUIDANCE THORACENTESIS  [ ] NEEDLE GUIDANCE PARACENTESIS  [ ] NEEDLE GUIDANCE PERICARDIOCENTESIS  [ ] OTHER    FINDINGS:    Cardiac: RV smaller than LV with poor contractility       INTERPRETATION:    RV dsyfunction

## 2019-02-13 LAB
ALBUMIN SERPL ELPH-MCNC: 2.8 G/DL — LOW (ref 3.3–5)
ALP SERPL-CCNC: 190 U/L — HIGH (ref 40–120)
ALT FLD-CCNC: 20 U/L — SIGNIFICANT CHANGE UP (ref 4–33)
ANION GAP SERPL CALC-SCNC: 14 MMO/L — SIGNIFICANT CHANGE UP (ref 7–14)
ANISOCYTOSIS BLD QL: SIGNIFICANT CHANGE UP
APTT BLD: 32.6 SEC — SIGNIFICANT CHANGE UP (ref 27.5–36.3)
AST SERPL-CCNC: 27 U/L — SIGNIFICANT CHANGE UP (ref 4–32)
BASE EXCESS BLDA CALC-SCNC: 4.1 MMOL/L — SIGNIFICANT CHANGE UP
BASE EXCESS BLDA CALC-SCNC: 4.2 MMOL/L — SIGNIFICANT CHANGE UP
BASE EXCESS BLDA CALC-SCNC: 4.7 MMOL/L — SIGNIFICANT CHANGE UP
BASOPHILS # BLD AUTO: 0.1 K/UL — SIGNIFICANT CHANGE UP (ref 0–0.2)
BASOPHILS NFR BLD AUTO: 0.7 % — SIGNIFICANT CHANGE UP (ref 0–2)
BASOPHILS NFR SPEC: 1.9 % — SIGNIFICANT CHANGE UP (ref 0–2)
BILIRUB SERPL-MCNC: 0.3 MG/DL — SIGNIFICANT CHANGE UP (ref 0.2–1.2)
BLASTS # FLD: 0 % — SIGNIFICANT CHANGE UP (ref 0–0)
BLD GP AB SCN SERPL QL: NEGATIVE — SIGNIFICANT CHANGE UP
BUN SERPL-MCNC: 29 MG/DL — HIGH (ref 7–23)
CA-I BLDA-SCNC: 1.15 MMOL/L — SIGNIFICANT CHANGE UP (ref 1.15–1.29)
CA-I BLDA-SCNC: 1.31 MMOL/L — HIGH (ref 1.15–1.29)
CALCIUM SERPL-MCNC: 8.7 MG/DL — SIGNIFICANT CHANGE UP (ref 8.4–10.5)
CHLORIDE BLDA-SCNC: 106 MMOL/L — SIGNIFICANT CHANGE UP (ref 96–108)
CHLORIDE SERPL-SCNC: 96 MMOL/L — LOW (ref 98–107)
CO2 SERPL-SCNC: 27 MMOL/L — SIGNIFICANT CHANGE UP (ref 22–31)
CREAT SERPL-MCNC: 0.68 MG/DL — SIGNIFICANT CHANGE UP (ref 0.5–1.3)
EOSINOPHIL # BLD AUTO: 0.71 K/UL — HIGH (ref 0–0.5)
EOSINOPHIL NFR BLD AUTO: 4.6 % — SIGNIFICANT CHANGE UP (ref 0–6)
EOSINOPHIL NFR FLD: 1 % — SIGNIFICANT CHANGE UP (ref 0–6)
GLUCOSE BLDA-MCNC: 130 MG/DL — HIGH (ref 70–99)
GLUCOSE BLDA-MCNC: 273 MG/DL — HIGH (ref 70–99)
GLUCOSE BLDA-MCNC: 285 MG/DL — HIGH (ref 70–99)
GLUCOSE BLDC GLUCOMTR-MCNC: 150 MG/DL — HIGH (ref 70–99)
GLUCOSE BLDC GLUCOMTR-MCNC: 206 MG/DL — HIGH (ref 70–99)
GLUCOSE BLDC GLUCOMTR-MCNC: 263 MG/DL — HIGH (ref 70–99)
GLUCOSE SERPL-MCNC: 155 MG/DL — HIGH (ref 70–99)
HCO3 BLDA-SCNC: 28 MMOL/L — HIGH (ref 22–26)
HCT VFR BLD CALC: 25.5 % — LOW (ref 34.5–45)
HCT VFR BLDA CALC: 21.8 % — LOW (ref 34.5–46.5)
HCT VFR BLDA CALC: 24.9 % — LOW (ref 34.5–46.5)
HCT VFR BLDA CALC: 26.7 % — LOW (ref 34.5–46.5)
HGB BLD-MCNC: 7.5 G/DL — LOW (ref 11.5–15.5)
HGB BLDA-MCNC: 7 G/DL — CRITICAL LOW (ref 11.5–15.5)
HGB BLDA-MCNC: 8 G/DL — LOW (ref 11.5–15.5)
HGB BLDA-MCNC: 8.6 G/DL — LOW (ref 11.5–15.5)
IMM GRANULOCYTES NFR BLD AUTO: 7.7 % — HIGH (ref 0–1.5)
INR BLD: 1.11 — SIGNIFICANT CHANGE UP (ref 0.88–1.17)
LACTATE BLDA-SCNC: 1.2 MMOL/L — SIGNIFICANT CHANGE UP (ref 0.5–2)
LACTATE BLDA-SCNC: 1.3 MMOL/L — SIGNIFICANT CHANGE UP (ref 0.5–2)
LACTATE BLDA-SCNC: 2.4 MMOL/L — HIGH (ref 0.5–2)
LYMPHOCYTES # BLD AUTO: 19.1 % — SIGNIFICANT CHANGE UP (ref 13–44)
LYMPHOCYTES # BLD AUTO: 2.93 K/UL — SIGNIFICANT CHANGE UP (ref 1–3.3)
LYMPHOCYTES NFR SPEC AUTO: 12.7 % — LOW (ref 13–44)
MACROCYTES BLD QL: SIGNIFICANT CHANGE UP
MAGNESIUM SERPL-MCNC: 2.1 MG/DL — SIGNIFICANT CHANGE UP (ref 1.6–2.6)
MCHC RBC-ENTMCNC: 29.4 % — LOW (ref 32–36)
MCHC RBC-ENTMCNC: 30.6 PG — SIGNIFICANT CHANGE UP (ref 27–34)
MCV RBC AUTO: 104.1 FL — HIGH (ref 80–100)
METAMYELOCYTES # FLD: 2 % — HIGH (ref 0–1)
MONOCYTES # BLD AUTO: 1.54 K/UL — HIGH (ref 0–0.9)
MONOCYTES NFR BLD AUTO: 10 % — SIGNIFICANT CHANGE UP (ref 2–14)
MONOCYTES NFR BLD: 6.9 % — SIGNIFICANT CHANGE UP (ref 2–9)
MYELOCYTES NFR BLD: 4.9 % — HIGH (ref 0–0)
NEUTROPHIL AB SER-ACNC: 69.6 % — SIGNIFICANT CHANGE UP (ref 43–77)
NEUTROPHILS # BLD AUTO: 8.91 K/UL — HIGH (ref 1.8–7.4)
NEUTROPHILS NFR BLD AUTO: 57.9 % — SIGNIFICANT CHANGE UP (ref 43–77)
NEUTS BAND # BLD: 1 % — SIGNIFICANT CHANGE UP (ref 0–6)
NRBC # BLD: 10 /100WBC — SIGNIFICANT CHANGE UP
NRBC # FLD: 0.87 K/UL — LOW (ref 25–125)
NRBC FLD-RTO: 5.7 — SIGNIFICANT CHANGE UP
OTHER - HEMATOLOGY %: 0 — SIGNIFICANT CHANGE UP
PCO2 BLDA: 45 MMHG — SIGNIFICANT CHANGE UP (ref 32–48)
PCO2 BLDA: 49 MMHG — HIGH (ref 32–48)
PCO2 BLDA: 52 MMHG — HIGH (ref 32–48)
PH BLDA: 7.38 PH — SIGNIFICANT CHANGE UP (ref 7.35–7.45)
PH BLDA: 7.39 PH — SIGNIFICANT CHANGE UP (ref 7.35–7.45)
PH BLDA: 7.42 PH — SIGNIFICANT CHANGE UP (ref 7.35–7.45)
PHOSPHATE SERPL-MCNC: 3.8 MG/DL — SIGNIFICANT CHANGE UP (ref 2.5–4.5)
PLATELET # BLD AUTO: 542 K/UL — HIGH (ref 150–400)
PLATELET COUNT - ESTIMATE: SIGNIFICANT CHANGE UP
PMV BLD: 10.1 FL — SIGNIFICANT CHANGE UP (ref 7–13)
PO2 BLDA: 173 MMHG — HIGH (ref 83–108)
PO2 BLDA: 46 MMHG — CRITICAL LOW (ref 83–108)
PO2 BLDA: 65 MMHG — LOW (ref 83–108)
POLYCHROMASIA BLD QL SMEAR: SLIGHT — SIGNIFICANT CHANGE UP
POTASSIUM BLDA-SCNC: 3.6 MMOL/L — SIGNIFICANT CHANGE UP (ref 3.4–4.5)
POTASSIUM BLDA-SCNC: 3.9 MMOL/L — SIGNIFICANT CHANGE UP (ref 3.4–4.5)
POTASSIUM BLDA-SCNC: 4.9 MMOL/L — HIGH (ref 3.4–4.5)
POTASSIUM SERPL-MCNC: 3.5 MMOL/L — SIGNIFICANT CHANGE UP (ref 3.5–5.3)
POTASSIUM SERPL-SCNC: 3.5 MMOL/L — SIGNIFICANT CHANGE UP (ref 3.5–5.3)
PROMYELOCYTES # FLD: 0 % — SIGNIFICANT CHANGE UP (ref 0–0)
PROT SERPL-MCNC: 6.3 G/DL — SIGNIFICANT CHANGE UP (ref 6–8.3)
PROTHROM AB SERPL-ACNC: 12.4 SEC — SIGNIFICANT CHANGE UP (ref 9.8–13.1)
RBC # BLD: 2.45 M/UL — LOW (ref 3.8–5.2)
RBC # FLD: 21.2 % — HIGH (ref 10.3–14.5)
RH IG SCN BLD-IMP: POSITIVE — SIGNIFICANT CHANGE UP
SAO2 % BLDA: 72.9 % — LOW (ref 95–99)
SAO2 % BLDA: 91.6 % — LOW (ref 95–99)
SAO2 % BLDA: 97.9 % — SIGNIFICANT CHANGE UP (ref 95–99)
SMUDGE CELLS # BLD: PRESENT — SIGNIFICANT CHANGE UP
SODIUM BLDA-SCNC: 138 MMOL/L — SIGNIFICANT CHANGE UP (ref 136–146)
SODIUM BLDA-SCNC: 139 MMOL/L — SIGNIFICANT CHANGE UP (ref 136–146)
SODIUM BLDA-SCNC: 141 MMOL/L — SIGNIFICANT CHANGE UP (ref 136–146)
SODIUM SERPL-SCNC: 137 MMOL/L — SIGNIFICANT CHANGE UP (ref 135–145)
VARIANT LYMPHS # BLD: 0 % — SIGNIFICANT CHANGE UP
WBC # BLD: 15.37 K/UL — HIGH (ref 3.8–10.5)
WBC # FLD AUTO: 15.37 K/UL — HIGH (ref 3.8–10.5)

## 2019-02-13 PROCEDURE — 99291 CRITICAL CARE FIRST HOUR: CPT | Mod: 25

## 2019-02-13 PROCEDURE — 93308 TTE F-UP OR LMTD: CPT | Mod: 26,GC

## 2019-02-13 PROCEDURE — 76604 US EXAM CHEST: CPT | Mod: 26,GC

## 2019-02-13 RX ADMIN — CISATRACURIUM BESYLATE 20.2 MICROGRAM(S)/KG/MIN: 2 INJECTION INTRAVENOUS at 09:17

## 2019-02-13 RX ADMIN — HUMAN INSULIN 28 UNIT(S): 100 INJECTION, SUSPENSION SUBCUTANEOUS at 12:08

## 2019-02-13 RX ADMIN — CHLORHEXIDINE GLUCONATE 15 MILLILITER(S): 213 SOLUTION TOPICAL at 18:38

## 2019-02-13 RX ADMIN — Medication 10 MILLIGRAM(S): at 05:45

## 2019-02-13 RX ADMIN — Medication 20 MILLIGRAM(S): at 21:36

## 2019-02-13 RX ADMIN — Medication 4: at 18:48

## 2019-02-13 RX ADMIN — Medication 20 MILLIGRAM(S): at 15:16

## 2019-02-13 RX ADMIN — Medication 650 MILLIGRAM(S): at 08:41

## 2019-02-13 RX ADMIN — HEPARIN SODIUM 7500 UNIT(S): 5000 INJECTION INTRAVENOUS; SUBCUTANEOUS at 21:36

## 2019-02-13 RX ADMIN — OXYCODONE HYDROCHLORIDE 15 MILLIGRAM(S): 5 TABLET ORAL at 08:44

## 2019-02-13 RX ADMIN — OXYCODONE HYDROCHLORIDE 15 MILLIGRAM(S): 5 TABLET ORAL at 08:47

## 2019-02-13 RX ADMIN — PROPOFOL 33.66 MICROGRAM(S)/KG/MIN: 10 INJECTION, EMULSION INTRAVENOUS at 05:48

## 2019-02-13 RX ADMIN — HUMAN INSULIN 28 UNIT(S): 100 INJECTION, SUSPENSION SUBCUTANEOUS at 05:47

## 2019-02-13 RX ADMIN — Medication 1 APPLICATION(S): at 15:17

## 2019-02-13 RX ADMIN — HYDROMORPHONE HYDROCHLORIDE 1 MILLIGRAM(S): 2 INJECTION INTRAMUSCULAR; INTRAVENOUS; SUBCUTANEOUS at 08:44

## 2019-02-13 RX ADMIN — PANTOPRAZOLE SODIUM 40 MILLIGRAM(S): 20 TABLET, DELAYED RELEASE ORAL at 12:20

## 2019-02-13 RX ADMIN — Medication 40 MILLIGRAM(S): at 05:44

## 2019-02-13 RX ADMIN — Medication 1 APPLICATION(S): at 05:46

## 2019-02-13 RX ADMIN — SIMVASTATIN 20 MILLIGRAM(S): 20 TABLET, FILM COATED ORAL at 21:36

## 2019-02-13 RX ADMIN — HEPARIN SODIUM 7500 UNIT(S): 5000 INJECTION INTRAVENOUS; SUBCUTANEOUS at 15:16

## 2019-02-13 RX ADMIN — PROPOFOL 33.66 MICROGRAM(S)/KG/MIN: 10 INJECTION, EMULSION INTRAVENOUS at 12:07

## 2019-02-13 RX ADMIN — Medication 1 APPLICATION(S): at 18:29

## 2019-02-13 RX ADMIN — Medication 40 MILLIGRAM(S): at 18:38

## 2019-02-13 RX ADMIN — HEPARIN SODIUM 7500 UNIT(S): 5000 INJECTION INTRAVENOUS; SUBCUTANEOUS at 05:44

## 2019-02-13 RX ADMIN — FENTANYL CITRATE 44.88 MICROGRAM(S)/KG/HR: 50 INJECTION INTRAVENOUS at 09:17

## 2019-02-13 RX ADMIN — FENTANYL CITRATE 44.88 MICROGRAM(S)/KG/HR: 50 INJECTION INTRAVENOUS at 05:49

## 2019-02-13 RX ADMIN — CHLORHEXIDINE GLUCONATE 1 APPLICATION(S): 213 SOLUTION TOPICAL at 15:17

## 2019-02-13 RX ADMIN — Medication 6: at 12:07

## 2019-02-13 RX ADMIN — Medication 20 MILLIGRAM(S): at 05:44

## 2019-02-13 RX ADMIN — HUMAN INSULIN 28 UNIT(S): 100 INJECTION, SUSPENSION SUBCUTANEOUS at 18:49

## 2019-02-13 RX ADMIN — CISATRACURIUM BESYLATE 20.2 MICROGRAM(S)/KG/MIN: 2 INJECTION INTRAVENOUS at 05:47

## 2019-02-13 RX ADMIN — CHLORHEXIDINE GLUCONATE 15 MILLILITER(S): 213 SOLUTION TOPICAL at 05:44

## 2019-02-13 RX ADMIN — Medication 137 MICROGRAM(S): at 05:44

## 2019-02-13 RX ADMIN — PROPOFOL 33.66 MICROGRAM(S)/KG/MIN: 10 INJECTION, EMULSION INTRAVENOUS at 09:17

## 2019-02-13 NOTE — PROGRESS NOTE ADULT - ATTENDING COMMENTS
63 year old female with HTN, DM, hypothyroidism, RA, pulmonary HTN, CHRIS, and COPD (3L at home) originally admitted on 12/15 for SBO s/p ex lap with lysis of adhesions, 12/30 she eviscerated 2/2 a coughing episode had to go back to OR, c/b multifocal pneumonia, enterococcus bacteremia, hypercapnic respiratory intubated 1/6, extubated on 1/24, readmitted to MICU with hypoxic respiratory failure requiring reintubation currently on VDR and nitric oxide remains with profound hypoxia    - transitioned off VDR today  - meta-nebs q6h for pulmonary toilet   - if remains stable on vent will wean off paralysis, continue sedatives   - off vasopressors   - afebrile overnight  - good urine output with stable creatinine  - keep net negative about 1.5 liters  - tolerating Sildenafil     Case discussed in detail with Curtis Pisano at bedside  critical care time 40 minutes

## 2019-02-13 NOTE — PROGRESS NOTE ADULT - SUBJECTIVE AND OBJECTIVE BOX
CHIEF COMPLAINT:    Interval Events:    REVIEW OF SYSTEMS:  Constitutional: [ ] negative [ ] fevers [ ] chills [ ] weight loss [ ] weight gain  HEENT: [ ] negative [ ] dry eyes [ ] eye irritation [ ] postnasal drip [ ] nasal congestion  CV: [ ] negative  [ ] chest pain [ ] orthopnea [ ] palpitations [ ] murmur  Resp: [ ] negative [ ] cough [ ] shortness of breath [ ] dyspnea [ ] wheezing [ ] sputum [ ] hemoptysis  GI: [ ] negative [ ] nausea [ ] vomiting [ ] diarrhea [ ] constipation [ ] abd pain [ ] dysphagia   : [ ] negative [ ] dysuria [ ] nocturia [ ] hematuria [ ] increased urinary frequency  Musculoskeletal: [ ] negative [ ] back pain [ ] myalgias [ ] arthralgias [ ] fracture  Skin: [ ] negative [ ] rash [ ] itch  Neurological: [ ] negative [ ] headache [ ] dizziness [ ] syncope [ ] weakness [ ] numbness  Psychiatric: [ ] negative [ ] anxiety [ ] depression  Endocrine: [ ] negative [ ] diabetes [ ] thyroid problem  Hematologic/Lymphatic: [ ] negative [ ] anemia [ ] bleeding problem  Allergic/Immunologic: [ ] negative [ ] itchy eyes [ ] nasal discharge [ ] hives [ ] angioedema  [ ] All other systems negative  [ ] Unable to assess ROS because ________    OBJECTIVE:  ICU Vital Signs Last 24 Hrs  T(C): 36.6 (13 Feb 2019 04:00), Max: 37.1 (12 Feb 2019 08:00)  T(F): 97.8 (13 Feb 2019 04:00), Max: 98.7 (12 Feb 2019 08:00)  HR: 95 (13 Feb 2019 07:00) (85 - 109)  BP: 166/81 (13 Feb 2019 07:00) (85/52 - 179/103)  BP(mean): 106 (13 Feb 2019 07:00) (63 - 123)  ABP: --  ABP(mean): --  RR: 14 (13 Feb 2019 07:00) (14 - 15)  SpO2: 91% (13 Feb 2019 07:00) (91% - 99%)    Mode: standby, RR (machine): 15, FiO2: 70, ITime: 1, MAP: 23, PIP: 33, Frequency: 645    02-12 @ 07:01  -  02-13 @ 07:00  --------------------------------------------------------  IN: 3287.6 mL / OUT: 2325 mL / NET: 962.6 mL      CAPILLARY BLOOD GLUCOSE    POCT Blood Glucose.: 150 mg/dL (13 Feb 2019 05:36)    PHYSICAL EXAM:  General: NAD  HEENT: NC/AT; PERRL, clear conjunctiva  Neck: supple  Respiratory: CTA b/l  Cardiovascular: +S1/S2; RRR  Abdomen: soft, NT/ND; +BS x4, L-sided abdominal dressing  Extremities: WWP, 2+ peripheral pulses b/l; anasarca reduced compared to yesterday  Skin: normal color and turgor; no rash  Neurological: unable to assess given intubation/sedation     LINES:    HOSPITAL MEDICATIONS:  Standing Meds:  chlorhexidine 0.12% Liquid 15 milliLiter(s) Swish and Spit every 12 hours  chlorhexidine 4% Liquid 1 Application(s) Topical <User Schedule>  cisatracurium Infusion 3 MICROgram(s)/kG/Min IV Continuous <Continuous>  collagenase Ointment 1 Application(s) Topical daily  dextrose 5%. 1000 milliLiter(s) IV Continuous <Continuous>  dextrose 50% Injectable 12.5 Gram(s) IV Push once  dextrose 50% Injectable 25 Gram(s) IV Push once  dextrose 50% Injectable 25 Gram(s) IV Push once  fentaNYL   Infusion. 4 MICROgram(s)/kG/Hr IV Continuous <Continuous>  furosemide   Injectable 40 milliGRAM(s) IV Push every 12 hours  heparin  Injectable 7500 Unit(s) SubCutaneous every 8 hours  influenza   Vaccine 0.5 milliLiter(s) IntraMuscular once  insulin lispro (HumaLOG) corrective regimen sliding scale   SubCutaneous every 6 hours  insulin NPH human recombinant 28 Unit(s) SubCutaneous every 6 hours  levothyroxine 137 MICROGram(s) Oral daily  methylPREDNISolone sodium succinate Injectable 10 milliGRAM(s) IV Push daily  pantoprazole  Injectable 40 milliGRAM(s) IV Push daily  petrolatum Ophthalmic Ointment 1 Application(s) Both EYES every 12 hours  propofol Infusion 50 MICROgram(s)/kG/Min IV Continuous <Continuous>  sildenafil (REVATIO) 20 milliGRAM(s) Oral three times a day  simvastatin 20 milliGRAM(s) Oral at bedtime      PRN Meds:  acetaminophen    Suspension .. 650 milliGRAM(s) Oral every 6 hours PRN  dextrose 40% Gel 15 Gram(s) Oral once PRN  glucagon  Injectable 1 milliGRAM(s) IntraMuscular once PRN      LABS:                        7.5    15.37 )-----------( 542      ( 13 Feb 2019 02:35 )             25.5     Hgb Trend: 7.5<--, 7.7<--, 8.0<--, 7.2<--, 7.1<--  02-13    137  |  96<L>  |  29<H>  ----------------------------<  155<H>  3.5   |  27  |  0.68    Ca    8.7      13 Feb 2019 02:35  Phos  3.8     02-13  Mg     2.1     02-13    TPro  6.3  /  Alb  2.8<L>  /  TBili  0.3  /  DBili  x   /  AST  27  /  ALT  20  /  AlkPhos  190<H>  02-13    Creatinine Trend: 0.68<--, 0.64<--, 0.69<--, 0.74<--, 0.75<--, 0.80<--  PT/INR - ( 13 Feb 2019 02:35 )   PT: 12.4 SEC;   INR: 1.11          PTT - ( 13 Feb 2019 02:35 )  PTT:32.6 SEC    Arterial Blood Gas:  02-13 @ 00:50  7.42/45/65/28/91.6/4.2  ABG lactate: 1.3  Arterial Blood Gas:  02-12 @ 09:27  7.44/42/60/28/89.3/4.3  ABG lactate: 1.5  Arterial Blood Gas:  02-11 @ 11:00  7.44/41/58/28/88.3/3.6  ABG lactate: 1.8    Venous Blood Gas:  02-11 @ 21:30  7.43/47/34/29/55.6  VBG Lactate: 2.5

## 2019-02-13 NOTE — PROGRESS NOTE ADULT - ASSESSMENT
63 year old female with PMHx of HTN, DM, hypothyroidism, RA (prednisone 10mg), pulmonary HTN, CHRIS, and COPD (3L at home) who was originally admitted on 12/15 for SBO s/p ex lap with lysis of adhesions (4 retention sutures) c/b 12/30 she eviscerated 2/2 a coughing episode s/p ex lap (6 retention sutures) c/b multifocal pneumonia, developed hypercapnic respiratory failure requiring intubation 1/6, s/p bronch 1/9/18, and extubated on 1/24, course further c/b Enterococci bacteremia now readmitted to MICU for hypoxic respiratory failure 2/2 atelectasis requiring intubation 01/31    #Neuro  - Sedated   - Continue propofol and fentanyl     #Respiratory   1) Hypoxic Respiratory Failure 2/2 atelectasis requiring intubation 1/31   -Bronchoscopy showing findings concerning for hyperdynamic airway collapse.   - Patient was deconditioned and unable to sit up and participate with PT leading to a functional shunt   - At this time, will continue ventilatory support   - will diurese w/ lasix 40 IV BID ; will keep net negative 1-2L per day ; will check lytes q12 on BMP to replete as needed especially K   - now dependent on NO; started Sildenafil 20 TID - BPs tolerating; will de-escalate NO over night and will switch off of VDR at this time   - will get daily ABG    #Cardiovascular  1) Cardiogenic shock 2/2 iatrogenic   - Prior bedside US showing LVOT; currently not concerned for LVOT on most recent POCUS  - off Levophed; pressures stable  - Holding amlodipine 10mg, and metoprolol 25mg BID    - c/w Simvastatin 20mg     #GI  - SBO s/p ex-lap with findings of healthy bowel c/b evisceration of bowel requiring placement of retention sutures  - NPO with tube feeds   - Continue Protonix  - will f/u with surgery regarding abdomen    #Renal/Metabolic  - Monitor for electrolyte derangements in the setting of diuresis ; replete as needed  - trend BMP for BUN/Cr    #ID   - Off antibiotics as per discussion w/ ID - had 27 days of Vanc, 3 days of Ceftriaxone, and 8 days of Zosyn for enterococcal bacteremia and Klebsiella sputum culture   - Repeat infectious work up negative: UA, RVP  - C diff negative    #Endocrine  1) Diabetes Mellitus Type 2  - NPH 28 q6; off insulin gtt  - Gabapentin 400 TID held for now     2) Hypothyroidism   - Levothyroxine 137     # Rheumatology  1) Rheumatoid arthritis   - c/w IV Prednisone 10     #DVT ppx  - Heparin SQ     # Ethics  GOC discussion ongoing with Palliative Care team. Son (HCP) would like to give the patient time on the ventilatory before making any decisions regarding tracheostomy ; per palliative, daughter will be coming in this week to assist with decision making process

## 2019-02-13 NOTE — CHART NOTE - NSCHARTNOTEFT_GEN_A_CORE
: Sukhwinder Pineda    INDICATION: Hypoxic respiratory failure     PROCEDURE:  [ X] LIMITED ECHO  [ X] LIMITED CHEST  [ ] LIMITED RETROPERITONEAL  [ ] LIMITED ABDOMINAL  [ ] LIMITED DVT  [ ] NEEDLE GUIDANCE VASCULAR  [ ] NEEDLE GUIDANCE THORACENTESIS  [ ] NEEDLE GUIDANCE PARACENTESIS  [ ] NEEDLE GUIDANCE PERICARDIOCENTESIS  [ ] OTHER    FINDINGS:  LUNG: kai predominate anteriorly, small bilateral consolidations with trace bilateral pleural effusions    CARDIAC: LV and RV walls thickened concentrically, but otherwise grossly normal LVSF    INTERPRETATION:  Improved bilateral atelectasis with grossly normal cardiac function : Sukhwinder Pineda    INDICATION: Hypoxic respiratory failure     PROCEDURE:  [ X] LIMITED ECHO  [ X] LIMITED CHEST  [ ] LIMITED RETROPERITONEAL  [ ] LIMITED ABDOMINAL  [ ] LIMITED DVT  [ ] NEEDLE GUIDANCE VASCULAR  [ ] NEEDLE GUIDANCE THORACENTESIS  [ ] NEEDLE GUIDANCE PARACENTESIS  [ ] NEEDLE GUIDANCE PERICARDIOCENTESIS  [ ] OTHER    FINDINGS:  LUNG: A-line predominant anteriorly, small bilateral consolidation/atelectasis with trace bilateral pleural effusions left greater than right    CARDIAC: LV and RV enlarged but smaller than LV,  grossly normal LV systolic function    INTERPRETATION:  Improved bilateral atelectasis with grossly normal cardiac function

## 2019-02-14 LAB
ALBUMIN SERPL ELPH-MCNC: 2.5 G/DL — LOW (ref 3.3–5)
ALP SERPL-CCNC: 185 U/L — HIGH (ref 40–120)
ALT FLD-CCNC: 32 U/L — SIGNIFICANT CHANGE UP (ref 4–33)
ANION GAP SERPL CALC-SCNC: 15 MMO/L — HIGH (ref 7–14)
APPEARANCE UR: CLEAR — SIGNIFICANT CHANGE UP
APTT BLD: 50.8 SEC — HIGH (ref 27.5–36.3)
AST SERPL-CCNC: 42 U/L — HIGH (ref 4–32)
BACTERIA # UR AUTO: NEGATIVE — SIGNIFICANT CHANGE UP
BASE EXCESS BLDA CALC-SCNC: 6 MMOL/L — SIGNIFICANT CHANGE UP
BASOPHILS # BLD AUTO: 0.07 K/UL — SIGNIFICANT CHANGE UP (ref 0–0.2)
BASOPHILS NFR BLD AUTO: 0.6 % — SIGNIFICANT CHANGE UP (ref 0–2)
BILIRUB SERPL-MCNC: 0.2 MG/DL — SIGNIFICANT CHANGE UP (ref 0.2–1.2)
BILIRUB UR-MCNC: NEGATIVE — SIGNIFICANT CHANGE UP
BLOOD UR QL VISUAL: NEGATIVE — SIGNIFICANT CHANGE UP
BUN SERPL-MCNC: 23 MG/DL — SIGNIFICANT CHANGE UP (ref 7–23)
CALCIUM SERPL-MCNC: 8.9 MG/DL — SIGNIFICANT CHANGE UP (ref 8.4–10.5)
CHLORIDE BLDA-SCNC: 105 MMOL/L — SIGNIFICANT CHANGE UP (ref 96–108)
CHLORIDE SERPL-SCNC: 98 MMOL/L — SIGNIFICANT CHANGE UP (ref 98–107)
CO2 SERPL-SCNC: 26 MMOL/L — SIGNIFICANT CHANGE UP (ref 22–31)
COLOR SPEC: SIGNIFICANT CHANGE UP
CREAT SERPL-MCNC: 0.54 MG/DL — SIGNIFICANT CHANGE UP (ref 0.5–1.3)
EOSINOPHIL # BLD AUTO: 0.64 K/UL — HIGH (ref 0–0.5)
EOSINOPHIL NFR BLD AUTO: 5.6 % — SIGNIFICANT CHANGE UP (ref 0–6)
GLUCOSE BLDA-MCNC: 255 MG/DL — HIGH (ref 70–99)
GLUCOSE BLDC GLUCOMTR-MCNC: 140 MG/DL — HIGH (ref 70–99)
GLUCOSE BLDC GLUCOMTR-MCNC: 145 MG/DL — HIGH (ref 70–99)
GLUCOSE BLDC GLUCOMTR-MCNC: 148 MG/DL — HIGH (ref 70–99)
GLUCOSE BLDC GLUCOMTR-MCNC: 271 MG/DL — HIGH (ref 70–99)
GLUCOSE SERPL-MCNC: 147 MG/DL — HIGH (ref 70–99)
GLUCOSE UR-MCNC: NEGATIVE — SIGNIFICANT CHANGE UP
HCO3 BLDA-SCNC: 30 MMOL/L — HIGH (ref 22–26)
HCT VFR BLD CALC: 25.7 % — LOW (ref 34.5–45)
HCT VFR BLDA CALC: 23.1 % — LOW (ref 34.5–46.5)
HGB BLD-MCNC: 7.6 G/DL — LOW (ref 11.5–15.5)
HGB BLDA-MCNC: 7.4 G/DL — LOW (ref 11.5–15.5)
HYALINE CASTS # UR AUTO: SIGNIFICANT CHANGE UP
IMM GRANULOCYTES NFR BLD AUTO: 6.9 % — HIGH (ref 0–1.5)
INR BLD: 1.14 — SIGNIFICANT CHANGE UP (ref 0.88–1.17)
KETONES UR-MCNC: NEGATIVE — SIGNIFICANT CHANGE UP
LACTATE BLDA-SCNC: 1.3 MMOL/L — SIGNIFICANT CHANGE UP (ref 0.5–2)
LEUKOCYTE ESTERASE UR-ACNC: SIGNIFICANT CHANGE UP
LYMPHOCYTES # BLD AUTO: 1.22 K/UL — SIGNIFICANT CHANGE UP (ref 1–3.3)
LYMPHOCYTES # BLD AUTO: 10.7 % — LOW (ref 13–44)
MAGNESIUM SERPL-MCNC: 2 MG/DL — SIGNIFICANT CHANGE UP (ref 1.6–2.6)
MCHC RBC-ENTMCNC: 29.6 % — LOW (ref 32–36)
MCHC RBC-ENTMCNC: 30.8 PG — SIGNIFICANT CHANGE UP (ref 27–34)
MCV RBC AUTO: 104 FL — HIGH (ref 80–100)
MONOCYTES # BLD AUTO: 1.08 K/UL — HIGH (ref 0–0.9)
MONOCYTES NFR BLD AUTO: 9.5 % — SIGNIFICANT CHANGE UP (ref 2–14)
NEUTROPHILS # BLD AUTO: 7.59 K/UL — HIGH (ref 1.8–7.4)
NEUTROPHILS NFR BLD AUTO: 66.7 % — SIGNIFICANT CHANGE UP (ref 43–77)
NITRITE UR-MCNC: POSITIVE — HIGH
NRBC # FLD: 0.4 K/UL — LOW (ref 25–125)
NRBC FLD-RTO: 3.5 — SIGNIFICANT CHANGE UP
PCO2 BLDA: 44 MMHG — SIGNIFICANT CHANGE UP (ref 32–48)
PH BLDA: 7.45 PH — SIGNIFICANT CHANGE UP (ref 7.35–7.45)
PH UR: 6 — SIGNIFICANT CHANGE UP (ref 5–8)
PHOSPHATE SERPL-MCNC: 3.7 MG/DL — SIGNIFICANT CHANGE UP (ref 2.5–4.5)
PLATELET # BLD AUTO: 530 K/UL — HIGH (ref 150–400)
PMV BLD: 10.1 FL — SIGNIFICANT CHANGE UP (ref 7–13)
PO2 BLDA: 172 MMHG — HIGH (ref 83–108)
POTASSIUM BLDA-SCNC: 4.4 MMOL/L — SIGNIFICANT CHANGE UP (ref 3.4–4.5)
POTASSIUM SERPL-MCNC: 3.6 MMOL/L — SIGNIFICANT CHANGE UP (ref 3.5–5.3)
POTASSIUM SERPL-SCNC: 3.6 MMOL/L — SIGNIFICANT CHANGE UP (ref 3.5–5.3)
PROT SERPL-MCNC: 6.2 G/DL — SIGNIFICANT CHANGE UP (ref 6–8.3)
PROT UR-MCNC: 10 — SIGNIFICANT CHANGE UP
PROTHROM AB SERPL-ACNC: 12.7 SEC — SIGNIFICANT CHANGE UP (ref 9.8–13.1)
RBC # BLD: 2.47 M/UL — LOW (ref 3.8–5.2)
RBC # FLD: 20.5 % — HIGH (ref 10.3–14.5)
RBC CASTS # UR COMP ASSIST: SIGNIFICANT CHANGE UP (ref 0–?)
SAO2 % BLDA: 98.2 % — SIGNIFICANT CHANGE UP (ref 95–99)
SODIUM BLDA-SCNC: 135 MMOL/L — LOW (ref 136–146)
SODIUM SERPL-SCNC: 139 MMOL/L — SIGNIFICANT CHANGE UP (ref 135–145)
SP GR SPEC: 1.02 — SIGNIFICANT CHANGE UP (ref 1–1.04)
SQUAMOUS # UR AUTO: SIGNIFICANT CHANGE UP
UROBILINOGEN FLD QL: NORMAL — SIGNIFICANT CHANGE UP
WBC # BLD: 11.38 K/UL — HIGH (ref 3.8–10.5)
WBC # FLD AUTO: 11.38 K/UL — HIGH (ref 3.8–10.5)
WBC UR QL: HIGH (ref 0–?)

## 2019-02-14 PROCEDURE — 99291 CRITICAL CARE FIRST HOUR: CPT | Mod: 25

## 2019-02-14 PROCEDURE — 76604 US EXAM CHEST: CPT | Mod: 26,GC

## 2019-02-14 PROCEDURE — 93308 TTE F-UP OR LMTD: CPT | Mod: 26,GC

## 2019-02-14 RX ORDER — SODIUM CHLORIDE 9 MG/ML
4 INJECTION INTRAMUSCULAR; INTRAVENOUS; SUBCUTANEOUS EVERY 6 HOURS
Qty: 0 | Refills: 0 | Status: DISCONTINUED | OUTPATIENT
Start: 2019-02-14 | End: 2019-02-25

## 2019-02-14 RX ORDER — LEVOTHYROXINE SODIUM 125 MCG
100 TABLET ORAL AT BEDTIME
Qty: 0 | Refills: 0 | Status: DISCONTINUED | OUTPATIENT
Start: 2019-02-14 | End: 2019-02-27

## 2019-02-14 RX ADMIN — PANTOPRAZOLE SODIUM 40 MILLIGRAM(S): 20 TABLET, DELAYED RELEASE ORAL at 13:03

## 2019-02-14 RX ADMIN — FENTANYL CITRATE 44.88 MICROGRAM(S)/KG/HR: 50 INJECTION INTRAVENOUS at 17:48

## 2019-02-14 RX ADMIN — CHLORHEXIDINE GLUCONATE 15 MILLILITER(S): 213 SOLUTION TOPICAL at 17:49

## 2019-02-14 RX ADMIN — HUMAN INSULIN 28 UNIT(S): 100 INJECTION, SUSPENSION SUBCUTANEOUS at 17:50

## 2019-02-14 RX ADMIN — HUMAN INSULIN 28 UNIT(S): 100 INJECTION, SUSPENSION SUBCUTANEOUS at 00:28

## 2019-02-14 RX ADMIN — SODIUM CHLORIDE 4 MILLILITER(S): 9 INJECTION INTRAMUSCULAR; INTRAVENOUS; SUBCUTANEOUS at 13:42

## 2019-02-14 RX ADMIN — Medication 1 APPLICATION(S): at 05:12

## 2019-02-14 RX ADMIN — Medication 1 APPLICATION(S): at 17:49

## 2019-02-14 RX ADMIN — PROPOFOL 33.66 MICROGRAM(S)/KG/MIN: 10 INJECTION, EMULSION INTRAVENOUS at 22:11

## 2019-02-14 RX ADMIN — Medication 20 MILLIGRAM(S): at 13:09

## 2019-02-14 RX ADMIN — FENTANYL CITRATE 44.88 MICROGRAM(S)/KG/HR: 50 INJECTION INTRAVENOUS at 08:36

## 2019-02-14 RX ADMIN — CHLORHEXIDINE GLUCONATE 15 MILLILITER(S): 213 SOLUTION TOPICAL at 05:12

## 2019-02-14 RX ADMIN — HEPARIN SODIUM 7500 UNIT(S): 5000 INJECTION INTRAVENOUS; SUBCUTANEOUS at 05:11

## 2019-02-14 RX ADMIN — SIMVASTATIN 20 MILLIGRAM(S): 20 TABLET, FILM COATED ORAL at 22:14

## 2019-02-14 RX ADMIN — Medication 100 MICROGRAM(S): at 22:24

## 2019-02-14 RX ADMIN — HEPARIN SODIUM 7500 UNIT(S): 5000 INJECTION INTRAVENOUS; SUBCUTANEOUS at 22:12

## 2019-02-14 RX ADMIN — HUMAN INSULIN 28 UNIT(S): 100 INJECTION, SUSPENSION SUBCUTANEOUS at 13:08

## 2019-02-14 RX ADMIN — PROPOFOL 33.66 MICROGRAM(S)/KG/MIN: 10 INJECTION, EMULSION INTRAVENOUS at 08:36

## 2019-02-14 RX ADMIN — CISATRACURIUM BESYLATE 20.2 MICROGRAM(S)/KG/MIN: 2 INJECTION INTRAVENOUS at 22:11

## 2019-02-14 RX ADMIN — HUMAN INSULIN 28 UNIT(S): 100 INJECTION, SUSPENSION SUBCUTANEOUS at 05:10

## 2019-02-14 RX ADMIN — SODIUM CHLORIDE 4 MILLILITER(S): 9 INJECTION INTRAMUSCULAR; INTRAVENOUS; SUBCUTANEOUS at 21:28

## 2019-02-14 RX ADMIN — CHLORHEXIDINE GLUCONATE 1 APPLICATION(S): 213 SOLUTION TOPICAL at 18:45

## 2019-02-14 RX ADMIN — Medication 137 MICROGRAM(S): at 05:11

## 2019-02-14 RX ADMIN — Medication 20 MILLIGRAM(S): at 22:13

## 2019-02-14 RX ADMIN — PROPOFOL 33.66 MICROGRAM(S)/KG/MIN: 10 INJECTION, EMULSION INTRAVENOUS at 17:49

## 2019-02-14 RX ADMIN — Medication 40 MILLIGRAM(S): at 05:11

## 2019-02-14 RX ADMIN — CISATRACURIUM BESYLATE 20.2 MICROGRAM(S)/KG/MIN: 2 INJECTION INTRAVENOUS at 08:36

## 2019-02-14 RX ADMIN — Medication 20 MILLIGRAM(S): at 05:11

## 2019-02-14 RX ADMIN — CISATRACURIUM BESYLATE 20.2 MICROGRAM(S)/KG/MIN: 2 INJECTION INTRAVENOUS at 17:49

## 2019-02-14 RX ADMIN — HEPARIN SODIUM 7500 UNIT(S): 5000 INJECTION INTRAVENOUS; SUBCUTANEOUS at 13:03

## 2019-02-14 RX ADMIN — Medication 40 MILLIGRAM(S): at 17:49

## 2019-02-14 RX ADMIN — FENTANYL CITRATE 44.88 MICROGRAM(S)/KG/HR: 50 INJECTION INTRAVENOUS at 22:11

## 2019-02-14 RX ADMIN — Medication 10 MILLIGRAM(S): at 05:10

## 2019-02-14 NOTE — PROGRESS NOTE ADULT - SUBJECTIVE AND OBJECTIVE BOX
CHIEF COMPLAINT:    Interval Events: no reported overnight events    REVIEW OF SYSTEMS:  [x ] Unable to assess ROS because intubated/sedated     OBJECTIVE:  ICU Vital Signs Last 24 Hrs  T(C): 36.7 (14 Feb 2019 04:00), Max: 36.7 (14 Feb 2019 04:00)  T(F): 98.1 (14 Feb 2019 04:00), Max: 98.1 (14 Feb 2019 04:00)  HR: 102 (14 Feb 2019 06:00) (77 - 102)  BP: 105/59 (14 Feb 2019 06:00) (85/65 - 166/107)  BP(mean): 73 (14 Feb 2019 06:00) (68 - 125)  RR: 28 (14 Feb 2019 06:00) (15 - 28)  SpO2: 91% (14 Feb 2019 06:00) (89% - 100%)    Mode: AC/ CMV (Assist Control/ Continuous Mandatory Ventilation), RR (machine): 28, TV (machine): 350, FiO2: 70, PEEP: 16, ITime: 1, MAP: 22, PIP: 36    02-13 @ 07:01  -  02-14 @ 07:00  --------------------------------------------------------  IN: 3205.6 mL / OUT: 3260 mL / NET: -54.4 mL      CAPILLARY BLOOD GLUCOSE      POCT Blood Glucose.: 148 mg/dL (14 Feb 2019 05:08)    PHYSICAL EXAM:  General: NAD  HEENT: NC/AT; PERRL, clear conjunctiva  Neck: supple  Respiratory: CTA b/l  Cardiovascular: +S1/S2; RRR  Abdomen: soft, NT/ND; +BS x4, L-sided abdominal dressing  Extremities: WWP, 2+ peripheral pulses b/l; anasarca reduced compared to yesterday  Skin: normal color and turgor; no rash  Neurological: unable to assess given intubation/sedation     LINES:    HOSPITAL MEDICATIONS:  Standing Meds:  chlorhexidine 0.12% Liquid 15 milliLiter(s) Swish and Spit every 12 hours  chlorhexidine 4% Liquid 1 Application(s) Topical <User Schedule>  cisatracurium Infusion 3 MICROgram(s)/kG/Min IV Continuous <Continuous>  collagenase Ointment 1 Application(s) Topical daily  dextrose 5%. 1000 milliLiter(s) IV Continuous <Continuous>  dextrose 50% Injectable 12.5 Gram(s) IV Push once  dextrose 50% Injectable 25 Gram(s) IV Push once  dextrose 50% Injectable 25 Gram(s) IV Push once  fentaNYL   Infusion. 4 MICROgram(s)/kG/Hr IV Continuous <Continuous>  furosemide   Injectable 40 milliGRAM(s) IV Push every 12 hours  heparin  Injectable 7500 Unit(s) SubCutaneous every 8 hours  influenza   Vaccine 0.5 milliLiter(s) IntraMuscular once  insulin lispro (HumaLOG) corrective regimen sliding scale   SubCutaneous every 6 hours  insulin NPH human recombinant 28 Unit(s) SubCutaneous every 6 hours  levothyroxine 137 MICROGram(s) Oral daily  methylPREDNISolone sodium succinate Injectable 10 milliGRAM(s) IV Push daily  pantoprazole  Injectable 40 milliGRAM(s) IV Push daily  petrolatum Ophthalmic Ointment 1 Application(s) Both EYES every 12 hours  propofol Infusion 50 MICROgram(s)/kG/Min IV Continuous <Continuous>  sildenafil (REVATIO) 20 milliGRAM(s) Oral three times a day  simvastatin 20 milliGRAM(s) Oral at bedtime      PRN Meds:  acetaminophen    Suspension .. 650 milliGRAM(s) Oral every 6 hours PRN  dextrose 40% Gel 15 Gram(s) Oral once PRN  glucagon  Injectable 1 milliGRAM(s) IntraMuscular once PRN      LABS:                        7.6    11.38 )-----------( 530      ( 14 Feb 2019 03:30 )             25.7     Hgb Trend: 7.6<--, 7.5<--, 7.7<--, 8.0<--, 7.2<--  02-14    139  |  98  |  23  ----------------------------<  147<H>  3.6   |  26  |  0.54    Ca    8.9      14 Feb 2019 03:30  Phos  3.7     02-14  Mg     2.0     02-14    TPro  6.2  /  Alb  2.5<L>  /  TBili  0.2  /  DBili  x   /  AST  42<H>  /  ALT  32  /  AlkPhos  185<H>  02-14    Creatinine Trend: 0.54<--, 0.68<--, 0.64<--, 0.69<--, 0.74<--, 0.75<--  PT/INR - ( 14 Feb 2019 03:30 )   PT: 12.7 SEC;   INR: 1.14          PTT - ( 14 Feb 2019 03:30 )  PTT:50.8 SEC    Arterial Blood Gas:  02-13 @ 13:46  7.38/52/173/28/97.9/4.7  ABG lactate: 1.2  Arterial Blood Gas:  02-13 @ 12:17  7.39/49/46/28/72.9/4.1  ABG lactate: 2.4  Arterial Blood Gas:  02-13 @ 00:50  7.42/45/65/28/91.6/4.2  ABG lactate: 1.3  Arterial Blood Gas:  02-12 @ 09:27  7.44/42/60/28/89.3/4.3  ABG lactate: 1.5 CHIEF COMPLAINT:    Interval Events: no reported overnight events    REVIEW OF SYSTEMS:  [x ] Unable to assess ROS because intubated/sedated     OBJECTIVE:  ICU Vital Signs Last 24 Hrs  T(C): 36.7 (14 Feb 2019 04:00), Max: 36.7 (14 Feb 2019 04:00)  T(F): 98.1 (14 Feb 2019 04:00), Max: 98.1 (14 Feb 2019 04:00)  HR: 102 (14 Feb 2019 06:00) (77 - 102)  BP: 105/59 (14 Feb 2019 06:00) (85/65 - 166/107)  BP(mean): 73 (14 Feb 2019 06:00) (68 - 125)  RR: 28 (14 Feb 2019 06:00) (15 - 28)  SpO2: 91% (14 Feb 2019 06:00) (89% - 100%)    Mode: AC/ CMV (Assist Control/ Continuous Mandatory Ventilation), RR (machine): 28, TV (machine): 350, FiO2: 70, PEEP: 16, ITime: 1, MAP: 22, PIP: 36    02-13 @ 07:01  -  02-14 @ 07:00  --------------------------------------------------------  IN: 3205.6 mL / OUT: 3260 mL / NET: -54.4 mL    CAPILLARY BLOOD GLUCOSE    POCT Blood Glucose.: 148 mg/dL (14 Feb 2019 05:08)    PHYSICAL EXAM:  General: NAD  HEENT: NC/AT; PERRL, clear conjunctiva  Neck: supple  Respiratory: CTA b/l  Cardiovascular: +S1/S2; RRR  Abdomen: soft, NT/ND; +BS x4, L-sided abdominal dressing  Extremities: WWP, 2+ peripheral pulses b/l; anasarca reduced compared to yesterday  Skin: normal color and turgor; no rash  Neurological: unable to assess given intubation/sedation     LINES:    HOSPITAL MEDICATIONS:  Standing Meds:  chlorhexidine 0.12% Liquid 15 milliLiter(s) Swish and Spit every 12 hours  chlorhexidine 4% Liquid 1 Application(s) Topical <User Schedule>  cisatracurium Infusion 3 MICROgram(s)/kG/Min IV Continuous <Continuous>  collagenase Ointment 1 Application(s) Topical daily  dextrose 5%. 1000 milliLiter(s) IV Continuous <Continuous>  dextrose 50% Injectable 12.5 Gram(s) IV Push once  dextrose 50% Injectable 25 Gram(s) IV Push once  dextrose 50% Injectable 25 Gram(s) IV Push once  fentaNYL   Infusion. 4 MICROgram(s)/kG/Hr IV Continuous <Continuous>  furosemide   Injectable 40 milliGRAM(s) IV Push every 12 hours  heparin  Injectable 7500 Unit(s) SubCutaneous every 8 hours  influenza   Vaccine 0.5 milliLiter(s) IntraMuscular once  insulin lispro (HumaLOG) corrective regimen sliding scale   SubCutaneous every 6 hours  insulin NPH human recombinant 28 Unit(s) SubCutaneous every 6 hours  levothyroxine 137 MICROGram(s) Oral daily  methylPREDNISolone sodium succinate Injectable 10 milliGRAM(s) IV Push daily  pantoprazole  Injectable 40 milliGRAM(s) IV Push daily  petrolatum Ophthalmic Ointment 1 Application(s) Both EYES every 12 hours  propofol Infusion 50 MICROgram(s)/kG/Min IV Continuous <Continuous>  sildenafil (REVATIO) 20 milliGRAM(s) Oral three times a day  simvastatin 20 milliGRAM(s) Oral at bedtime      PRN Meds:  acetaminophen    Suspension .. 650 milliGRAM(s) Oral every 6 hours PRN  dextrose 40% Gel 15 Gram(s) Oral once PRN  glucagon  Injectable 1 milliGRAM(s) IntraMuscular once PRN      LABS:                        7.6    11.38 )-----------( 530      ( 14 Feb 2019 03:30 )             25.7     Hgb Trend: 7.6<--, 7.5<--, 7.7<--, 8.0<--, 7.2<--  02-14    139  |  98  |  23  ----------------------------<  147<H>  3.6   |  26  |  0.54    Ca    8.9      14 Feb 2019 03:30  Phos  3.7     02-14  Mg     2.0     02-14    TPro  6.2  /  Alb  2.5<L>  /  TBili  0.2  /  DBili  x   /  AST  42<H>  /  ALT  32  /  AlkPhos  185<H>  02-14    Creatinine Trend: 0.54<--, 0.68<--, 0.64<--, 0.69<--, 0.74<--, 0.75<--  PT/INR - ( 14 Feb 2019 03:30 )   PT: 12.7 SEC;   INR: 1.14       PTT - ( 14 Feb 2019 03:30 )  PTT:50.8 SEC    Arterial Blood Gas:  02-13 @ 13:46  7.38/52/173/28/97.9/4.7  ABG lactate: 1.2  Arterial Blood Gas:  02-13 @ 12:17  7.39/49/46/28/72.9/4.1  ABG lactate: 2.4  Arterial Blood Gas:  02-13 @ 00:50  7.42/45/65/28/91.6/4.2  ABG lactate: 1.3  Arterial Blood Gas:  02-12 @ 09:27  7.44/42/60/28/89.3/4.3  ABG lactate: 1.5

## 2019-02-14 NOTE — PROGRESS NOTE ADULT - ATTENDING COMMENTS
63 year old female with HTN, DM, hypothyroidism, RA, pulmonary HTN, CHRIS, and COPD (3L at home) originally admitted on 12/15 for SBO s/p ex lap with lysis of adhesions, 12/30 she eviscerated 2/2 a coughing episode had to go back to OR, c/b multifocal pneumonia, enterococcus bacteremia, hypercapnic respiratory intubated 1/6, extubated on 1/24, readmitted to MICU with hypoxic respiratory failure requiring reintubation currently on  nitric oxide with improving hypoxia    - hypoxia improving  - will titrate nitric oxide  - meta-nebs q6h for pulmonary toilet   - if remains stable on vent will wean off paralysis, continue sedatives   - remains off vasopressors   - good urine output with stable creatinine  - keep net negative about 1.5 liters  - tolerating Sildenafil     Case discussed in detail with Curtis Pisano at bedside  critical care time 40 minutes

## 2019-02-14 NOTE — CHART NOTE - NSCHARTNOTEFT_GEN_A_CORE
: Sukhwinder Pineda    INDICATION: Hypoxic respiratory failure     PROCEDURE:  [ X] LIMITED ECHO  [ X] LIMITED CHEST  [ ] LIMITED RETROPERITONEAL  [ ] LIMITED ABDOMINAL  [ ] LIMITED DVT  [ ] NEEDLE GUIDANCE VASCULAR  [ ] NEEDLE GUIDANCE THORACENTESIS  [ ] NEEDLE GUIDANCE PARACENTESIS  [ ] NEEDLE GUIDANCE PERICARDIOCENTESIS  [ ] OTHER    FINDINGS:  LUNG: small L pleural effusion with bilateral consolidations, a line predominate anteriorly     CARDIAC: grossly normal LV systolic function    INTERPRETATION:  Improved bilateral atelectasis with grossly normal cardiac function : Sukhwinder Pineda    INDICATION: Hypoxic respiratory failure     PROCEDURE:  [X] LIMITED ECHO  [X] LIMITED CHEST  [ ] LIMITED RETROPERITONEAL  [ ] LIMITED ABDOMINAL  [ ] LIMITED DVT  [ ] NEEDLE GUIDANCE VASCULAR  [ ] NEEDLE GUIDANCE THORACENTESIS  [ ] NEEDLE GUIDANCE PARACENTESIS  [ ] NEEDLE GUIDANCE PERICARDIOCENTESIS  [ ] OTHER    FINDINGS:  LUNG: small L pleural effusion with bilateral consolidations, a line predominate anteriorly     CARDIAC: grossly normal LV systolic function, RV enlarged    INTERPRETATION:  Improved bilateral atelectasis with grossly normal cardiac function

## 2019-02-14 NOTE — PROGRESS NOTE ADULT - SUBJECTIVE AND OBJECTIVE BOX
Surgery Progress Note    S: Patient seen and examined. No acute events overnight. Sedated on propofol/fentanyl gtt. Medically paralyzed. On high vent settings- PEEP 16, FiO2 70%.    O:  Vital Signs Last 24 Hrs  T(C): 36.7 (14 Feb 2019 04:00), Max: 36.7 (14 Feb 2019 04:00)  T(F): 98.1 (14 Feb 2019 04:00), Max: 98.1 (14 Feb 2019 04:00)  HR: 102 (14 Feb 2019 06:00) (77 - 102)  BP: 105/59 (14 Feb 2019 06:00) (85/65 - 166/107)  BP(mean): 73 (14 Feb 2019 06:00) (68 - 125)  RR: 28 (14 Feb 2019 06:00) (15 - 28)  SpO2: 91% (14 Feb 2019 06:00) (89% - 100%)    I&O's Detail    13 Feb 2019 07:01  -  14 Feb 2019 07:00  --------------------------------------------------------  IN:    cisatracurium Infusion: 442.4 mL    Enteral Tube Flush: 150 mL    fentaNYL Infusion.: 988.8 mL    Glucerna: 880 mL    propofol Infusion: 744.4 mL  Total IN: 3205.6 mL    OUT:    Indwelling Catheter - Urethral: 3260 mL  Total OUT: 3260 mL    Total NET: -54.4 mL          MEDICATIONS  (STANDING):  chlorhexidine 0.12% Liquid 15 milliLiter(s) Swish and Spit every 12 hours  chlorhexidine 4% Liquid 1 Application(s) Topical <User Schedule>  cisatracurium Infusion 3 MICROgram(s)/kG/Min (20.196 mL/Hr) IV Continuous <Continuous>  collagenase Ointment 1 Application(s) Topical daily  dextrose 5%. 1000 milliLiter(s) (50 mL/Hr) IV Continuous <Continuous>  dextrose 50% Injectable 12.5 Gram(s) IV Push once  dextrose 50% Injectable 25 Gram(s) IV Push once  dextrose 50% Injectable 25 Gram(s) IV Push once  fentaNYL   Infusion. 4 MICROgram(s)/kG/Hr (44.88 mL/Hr) IV Continuous <Continuous>  furosemide   Injectable 40 milliGRAM(s) IV Push every 12 hours  heparin  Injectable 7500 Unit(s) SubCutaneous every 8 hours  influenza   Vaccine 0.5 milliLiter(s) IntraMuscular once  insulin lispro (HumaLOG) corrective regimen sliding scale   SubCutaneous every 6 hours  insulin NPH human recombinant 28 Unit(s) SubCutaneous every 6 hours  levothyroxine 137 MICROGram(s) Oral daily  methylPREDNISolone sodium succinate Injectable 10 milliGRAM(s) IV Push daily  pantoprazole  Injectable 40 milliGRAM(s) IV Push daily  petrolatum Ophthalmic Ointment 1 Application(s) Both EYES every 12 hours  propofol Infusion 50 MICROgram(s)/kG/Min (33.66 mL/Hr) IV Continuous <Continuous>  sildenafil (REVATIO) 20 milliGRAM(s) Oral three times a day  simvastatin 20 milliGRAM(s) Oral at bedtime    MEDICATIONS  (PRN):  acetaminophen    Suspension .. 650 milliGRAM(s) Oral every 6 hours PRN Temp greater or equal to 38C (100.4F), Mild Pain (1 - 3), Moderate Pain (4 - 6)  dextrose 40% Gel 15 Gram(s) Oral once PRN Blood Glucose LESS THAN 70 milliGRAM(s)/deciliter  glucagon  Injectable 1 milliGRAM(s) IntraMuscular once PRN Glucose LESS THAN 70 milligrams/deciliter                            7.6    11.38 )-----------( 530      ( 14 Feb 2019 03:30 )             25.7       02-14    139  |  98  |  23  ----------------------------<  147<H>  3.6   |  26  |  0.54    Ca    8.9      14 Feb 2019 03:30  Phos  3.7     02-14  Mg     2.0     02-14    TPro  6.2  /  Alb  2.5<L>  /  TBili  0.2  /  DBili  x   /  AST  42<H>  /  ALT  32  /  AlkPhos  185<H>  02-14      Physical Exam:  Gen: Laying in bed, NAD  Resp: On vent  Abd: soft, non-distended. Midline dressing c/d/i.  Ext: WWP  Skin: No rashes

## 2019-02-14 NOTE — PROGRESS NOTE ADULT - ASSESSMENT
63F w/ multiple respiratory comorbidities p/w SBO s/p ex-lap with findings of healthy bowel c/b evisceration of bowel requiring placement of retention sutures, difficult to wean off ventilation but eventually extubated and downgraded to RCU 01/26; readmitted to MICU due to hypoxic respiratory now reintubated, on NO.     - Continue BID dressing changes to midline wound  - Rest of care per MICU    Avery Gonzalez, PGY-2  B Team Surgery [id number]

## 2019-02-14 NOTE — PROGRESS NOTE ADULT - ASSESSMENT
63 year old female with PMHx of HTN, DM, hypothyroidism, RA (prednisone 10mg), pulmonary HTN, CHRIS, and COPD (3L at home) who was originally admitted on 12/15 for SBO s/p ex lap with lysis of adhesions (4 retention sutures) c/b 12/30 she eviscerated 2/2 a coughing episode s/p ex lap (6 retention sutures) c/b multifocal pneumonia, developed hypercapnic respiratory failure requiring intubation 1/6, s/p bronch 1/9/18, and extubated on 1/24, course further c/b Enterococci bacteremia now readmitted to MICU for hypoxic respiratory failure 2/2 atelectasis requiring intubation 01/31    #Neuro  - Sedated   - Continue propofol and fentanyl     #Respiratory   1) Hypoxic Respiratory Failure 2/2 atelectasis requiring intubation 1/31   -Bronchoscopy showing findings concerning for hyperdynamic airway collapse.   - Patient was deconditioned and unable to sit up and participate with PT leading to a functional shunt   - At this time, will continue ventilatory support   - will diurese w/ lasix 40 IV BID ; will keep net negative 1-2L per day ; will check lytes q12 on BMP to replete as needed especially K   - now dependent on NO; started Sildenafil 20 TID - BPs tolerating; will de-escalate NO over night and will switch off of VDR at this time; will check methemoglobinemia  - will get daily ABG    #Cardiovascular  1) Cardiogenic shock 2/2 iatrogenic   - Prior bedside US showing LVOT; currently not concerned for LVOT on most recent POCUS  - off Levophed; pressures stable  - Holding amlodipine 10mg, and metoprolol 25mg BID    - c/w Simvastatin 20mg     #GI  - SBO s/p ex-lap with findings of healthy bowel c/b evisceration of bowel requiring placement of retention sutures  - NPO with tube feeds   - Continue Protonix  - will f/u with surgery regarding abdomen    #Renal/Metabolic  - Monitor for electrolyte derangements in the setting of diuresis ; replete as needed  - trend BMP for BUN/Cr    #ID   - Off antibiotics as per discussion w/ ID - had 27 days of Vanc, 3 days of Ceftriaxone, and 8 days of Zosyn for enterococcal bacteremia and Klebsiella sputum culture   - Repeat infectious work up negative: UA, RVP  - C diff negative    #Endocrine  1) Diabetes Mellitus Type 2  - NPH 28 q6; off insulin gtt  - Gabapentin 400 TID held for now     2) Hypothyroidism   - Levothyroxine 137     # Rheumatology  1) Rheumatoid arthritis   - c/w IV solumedrol 10     #DVT ppx  - Heparin SQ     # Ethics  GOC discussion ongoing with Palliative Care team. Son (HCP) would like to give the patient time on the ventilatory before making any decisions regarding tracheostomy ; per palliative, daughter will be coming in this week to assist with decision making process 63 year old female with PMHx of HTN, DM, hypothyroidism, RA (prednisone 10mg), pulmonary HTN, CHRIS, and COPD (3L at home) who was originally admitted on 12/15 for SBO s/p ex lap with lysis of adhesions (4 retention sutures) c/b 12/30 she eviscerated 2/2 a coughing episode s/p ex lap (6 retention sutures) c/b multifocal pneumonia, developed hypercapnic respiratory failure requiring intubation 1/6, s/p bronch 1/9/18, and extubated on 1/24, course further c/b Enterococci bacteremia now readmitted to MICU for hypoxic respiratory failure 2/2 atelectasis requiring intubation 01/31    #Neuro  - Sedated   - Continue propofol and fentanyl     #Respiratory   1) Hypoxic Respiratory Failure 2/2 atelectasis requiring intubation 1/31   -Bronchoscopy showing findings concerning for hyperdynamic airway collapse.   - Patient was deconditioned and unable to sit up and participate with PT leading to a functional shunt   - At this time, will continue ventilatory support   - will diurese w/ lasix 40 IV BID ; will keep net negative 1-2L per day ; will check lytes q12 on BMP to replete as needed especially K   - now dependent on NO; started Sildenafil 20 TID - BPs tolerating; will de-escalate NO TODAY and off of VDR for one day; will check methemoglobin level  - will get daily ABG    #Cardiovascular  1) Cardiogenic shock 2/2 iatrogenic   - Prior bedside US showing LVOT; currently not concerned for LVOT on most recent POCUS  - off Levophed; pressures stable  - Holding amlodipine 10mg, and metoprolol 25mg BID    - c/w Simvastatin 20mg     #GI  - SBO s/p ex-lap with findings of healthy bowel c/b evisceration of bowel requiring placement of retention sutures  - NPO with tube feeds   - Continue Protonix  - will f/u with surgery regarding abdomen    #Renal/Metabolic  - Monitor for electrolyte derangements in the setting of diuresis ; replete as needed  - trend BMP for BUN/Cr    #ID   - hypothermic today to 94; fredi blood and urine cultures; will start back empiric antibiotics but no clear source  - Off antibiotics as per discussion w/ ID - had 27 days of Vanc, 3 days of Ceftriaxone, and 8 days of Zosyn for enterococcal bacteremia and Klebsiella sputum culture   - Repeat infectious work up negative: UA, RVP  - C diff negative    #Endocrine  1) Diabetes Mellitus Type 2  - NPH 28 q6; off insulin gtt  - Gabapentin 400 TID held for now     2) Hypothyroidism   - will switch to 100 mcg levothyroxine IV     # Rheumatology  1) Rheumatoid arthritis   - c/w IV solumedrol 10     #DVT ppx  - Heparin SQ     # Ethics  GOC discussion ongoing with Palliative Care team. Son (HCP) would like to give the patient time on the ventilatory before making any decisions regarding tracheostomy ; per palliative, daughter will be coming in this week to assist with decision making process

## 2019-02-15 LAB
ALBUMIN SERPL ELPH-MCNC: 2.4 G/DL — LOW (ref 3.3–5)
ALP SERPL-CCNC: 177 U/L — HIGH (ref 40–120)
ALT FLD-CCNC: 33 U/L — SIGNIFICANT CHANGE UP (ref 4–33)
ANION GAP SERPL CALC-SCNC: 17 MMO/L — HIGH (ref 7–14)
APTT BLD: 45.7 SEC — HIGH (ref 27.5–36.3)
AST SERPL-CCNC: 36 U/L — HIGH (ref 4–32)
BASE EXCESS BLDA CALC-SCNC: 4.4 MMOL/L — SIGNIFICANT CHANGE UP
BASOPHILS # BLD AUTO: 0.1 K/UL — SIGNIFICANT CHANGE UP (ref 0–0.2)
BASOPHILS NFR BLD AUTO: 1 % — SIGNIFICANT CHANGE UP (ref 0–2)
BILIRUB SERPL-MCNC: 0.2 MG/DL — SIGNIFICANT CHANGE UP (ref 0.2–1.2)
BUN SERPL-MCNC: 28 MG/DL — HIGH (ref 7–23)
CA-I BLDA-SCNC: 1.14 MMOL/L — LOW (ref 1.15–1.29)
CALCIUM SERPL-MCNC: 8.6 MG/DL — SIGNIFICANT CHANGE UP (ref 8.4–10.5)
CHLORIDE SERPL-SCNC: 96 MMOL/L — LOW (ref 98–107)
CO2 SERPL-SCNC: 24 MMOL/L — SIGNIFICANT CHANGE UP (ref 22–31)
CREAT SERPL-MCNC: 0.69 MG/DL — SIGNIFICANT CHANGE UP (ref 0.5–1.3)
EOSINOPHIL # BLD AUTO: 0.66 K/UL — HIGH (ref 0–0.5)
EOSINOPHIL NFR BLD AUTO: 6.5 % — HIGH (ref 0–6)
GLUCOSE BLDA-MCNC: 202 MG/DL — HIGH (ref 70–99)
GLUCOSE BLDC GLUCOMTR-MCNC: 116 MG/DL — HIGH (ref 70–99)
GLUCOSE BLDC GLUCOMTR-MCNC: 170 MG/DL — HIGH (ref 70–99)
GLUCOSE BLDC GLUCOMTR-MCNC: 170 MG/DL — HIGH (ref 70–99)
GLUCOSE BLDC GLUCOMTR-MCNC: 94 MG/DL — SIGNIFICANT CHANGE UP (ref 70–99)
GLUCOSE SERPL-MCNC: 104 MG/DL — HIGH (ref 70–99)
HCO3 BLDA-SCNC: 28 MMOL/L — HIGH (ref 22–26)
HCT VFR BLD CALC: 26.3 % — LOW (ref 34.5–45)
HCT VFR BLDA CALC: 23.4 % — LOW (ref 34.5–46.5)
HGB BLD-MCNC: 7.6 G/DL — LOW (ref 11.5–15.5)
HGB BLDA-MCNC: 7.5 G/DL — LOW (ref 11.5–15.5)
IMM GRANULOCYTES NFR BLD AUTO: 7.6 % — HIGH (ref 0–1.5)
INR BLD: 1.14 — SIGNIFICANT CHANGE UP (ref 0.88–1.17)
LACTATE BLDA-SCNC: 1.1 MMOL/L — SIGNIFICANT CHANGE UP (ref 0.5–2)
LYMPHOCYTES # BLD AUTO: 1.63 K/UL — SIGNIFICANT CHANGE UP (ref 1–3.3)
LYMPHOCYTES # BLD AUTO: 15.9 % — SIGNIFICANT CHANGE UP (ref 13–44)
MAGNESIUM SERPL-MCNC: 1.9 MG/DL — SIGNIFICANT CHANGE UP (ref 1.6–2.6)
MCHC RBC-ENTMCNC: 28.9 % — LOW (ref 32–36)
MCHC RBC-ENTMCNC: 30.5 PG — SIGNIFICANT CHANGE UP (ref 27–34)
MCV RBC AUTO: 105.6 FL — HIGH (ref 80–100)
MONOCYTES # BLD AUTO: 1.12 K/UL — HIGH (ref 0–0.9)
MONOCYTES NFR BLD AUTO: 11 % — SIGNIFICANT CHANGE UP (ref 2–14)
NEUTROPHILS # BLD AUTO: 5.93 K/UL — SIGNIFICANT CHANGE UP (ref 1.8–7.4)
NEUTROPHILS NFR BLD AUTO: 58 % — SIGNIFICANT CHANGE UP (ref 43–77)
NRBC # FLD: 0.81 K/UL — LOW (ref 25–125)
NRBC FLD-RTO: 7.9 — SIGNIFICANT CHANGE UP
PCO2 BLDA: 60 MMHG — HIGH (ref 32–48)
PH BLDA: 7.32 PH — LOW (ref 7.35–7.45)
PHOSPHATE SERPL-MCNC: 3.8 MG/DL — SIGNIFICANT CHANGE UP (ref 2.5–4.5)
PLATELET # BLD AUTO: 484 K/UL — HIGH (ref 150–400)
PMV BLD: 10.2 FL — SIGNIFICANT CHANGE UP (ref 7–13)
PO2 BLDA: 70 MMHG — LOW (ref 83–108)
POTASSIUM BLDA-SCNC: 4.3 MMOL/L — SIGNIFICANT CHANGE UP (ref 3.4–4.5)
POTASSIUM SERPL-MCNC: 3.9 MMOL/L — SIGNIFICANT CHANGE UP (ref 3.5–5.3)
POTASSIUM SERPL-SCNC: 3.9 MMOL/L — SIGNIFICANT CHANGE UP (ref 3.5–5.3)
PROT SERPL-MCNC: 5.9 G/DL — LOW (ref 6–8.3)
PROTHROM AB SERPL-ACNC: 12.7 SEC — SIGNIFICANT CHANGE UP (ref 9.8–13.1)
RBC # BLD: 2.49 M/UL — LOW (ref 3.8–5.2)
RBC # FLD: 21.1 % — HIGH (ref 10.3–14.5)
SAO2 % BLDA: 91.8 % — LOW (ref 95–99)
SODIUM BLDA-SCNC: 135 MMOL/L — LOW (ref 136–146)
SODIUM SERPL-SCNC: 137 MMOL/L — SIGNIFICANT CHANGE UP (ref 135–145)
SPECIMEN SOURCE: SIGNIFICANT CHANGE UP
WBC # BLD: 10.22 K/UL — SIGNIFICANT CHANGE UP (ref 3.8–10.5)
WBC # FLD AUTO: 10.22 K/UL — SIGNIFICANT CHANGE UP (ref 3.8–10.5)

## 2019-02-15 PROCEDURE — 99291 CRITICAL CARE FIRST HOUR: CPT

## 2019-02-15 RX ORDER — HUMAN INSULIN 100 [IU]/ML
24 INJECTION, SUSPENSION SUBCUTANEOUS EVERY 6 HOURS
Qty: 0 | Refills: 0 | Status: DISCONTINUED | OUTPATIENT
Start: 2019-02-15 | End: 2019-02-17

## 2019-02-15 RX ORDER — DEXTROSE 50 % IN WATER 50 %
25 SYRINGE (ML) INTRAVENOUS ONCE
Qty: 0 | Refills: 0 | Status: DISCONTINUED | OUTPATIENT
Start: 2019-02-15 | End: 2019-02-15

## 2019-02-15 RX ORDER — DEXTROSE 50 % IN WATER 50 %
15 SYRINGE (ML) INTRAVENOUS ONCE
Qty: 0 | Refills: 0 | Status: DISCONTINUED | OUTPATIENT
Start: 2019-02-15 | End: 2019-02-15

## 2019-02-15 RX ORDER — SODIUM CHLORIDE 9 MG/ML
1000 INJECTION, SOLUTION INTRAVENOUS
Qty: 0 | Refills: 0 | Status: DISCONTINUED | OUTPATIENT
Start: 2019-02-15 | End: 2019-02-15

## 2019-02-15 RX ORDER — NOREPINEPHRINE BITARTRATE/D5W 8 MG/250ML
0.05 PLASTIC BAG, INJECTION (ML) INTRAVENOUS
Qty: 16 | Refills: 0 | Status: DISCONTINUED | OUTPATIENT
Start: 2019-02-15 | End: 2019-02-15

## 2019-02-15 RX ORDER — GLUCAGON INJECTION, SOLUTION 0.5 MG/.1ML
1 INJECTION, SOLUTION SUBCUTANEOUS ONCE
Qty: 0 | Refills: 0 | Status: DISCONTINUED | OUTPATIENT
Start: 2019-02-15 | End: 2019-02-27

## 2019-02-15 RX ORDER — NOREPINEPHRINE BITARTRATE/D5W 8 MG/250ML
0.05 PLASTIC BAG, INJECTION (ML) INTRAVENOUS
Qty: 16 | Refills: 0 | Status: DISCONTINUED | OUTPATIENT
Start: 2019-02-15 | End: 2019-02-18

## 2019-02-15 RX ORDER — DEXTROSE 50 % IN WATER 50 %
12.5 SYRINGE (ML) INTRAVENOUS ONCE
Qty: 0 | Refills: 0 | Status: DISCONTINUED | OUTPATIENT
Start: 2019-02-15 | End: 2019-02-15

## 2019-02-15 RX ADMIN — PROPOFOL 33.66 MICROGRAM(S)/KG/MIN: 10 INJECTION, EMULSION INTRAVENOUS at 11:31

## 2019-02-15 RX ADMIN — Medication 40 MILLIGRAM(S): at 05:40

## 2019-02-15 RX ADMIN — Medication 20 MILLIGRAM(S): at 14:13

## 2019-02-15 RX ADMIN — HUMAN INSULIN 24 UNIT(S): 100 INJECTION, SUSPENSION SUBCUTANEOUS at 06:07

## 2019-02-15 RX ADMIN — FENTANYL CITRATE 44.88 MICROGRAM(S)/KG/HR: 50 INJECTION INTRAVENOUS at 11:32

## 2019-02-15 RX ADMIN — Medication 5.26 MICROGRAM(S)/KG/MIN: at 05:39

## 2019-02-15 RX ADMIN — PROPOFOL 33.66 MICROGRAM(S)/KG/MIN: 10 INJECTION, EMULSION INTRAVENOUS at 17:46

## 2019-02-15 RX ADMIN — HUMAN INSULIN 28 UNIT(S): 100 INJECTION, SUSPENSION SUBCUTANEOUS at 00:28

## 2019-02-15 RX ADMIN — SODIUM CHLORIDE 4 MILLILITER(S): 9 INJECTION INTRAMUSCULAR; INTRAVENOUS; SUBCUTANEOUS at 03:59

## 2019-02-15 RX ADMIN — HUMAN INSULIN 24 UNIT(S): 100 INJECTION, SUSPENSION SUBCUTANEOUS at 17:47

## 2019-02-15 RX ADMIN — Medication 10 MILLIGRAM(S): at 05:41

## 2019-02-15 RX ADMIN — CHLORHEXIDINE GLUCONATE 1 APPLICATION(S): 213 SOLUTION TOPICAL at 11:32

## 2019-02-15 RX ADMIN — Medication 100 MICROGRAM(S): at 21:47

## 2019-02-15 RX ADMIN — SIMVASTATIN 20 MILLIGRAM(S): 20 TABLET, FILM COATED ORAL at 21:47

## 2019-02-15 RX ADMIN — PROPOFOL 33.66 MICROGRAM(S)/KG/MIN: 10 INJECTION, EMULSION INTRAVENOUS at 20:33

## 2019-02-15 RX ADMIN — Medication 2: at 11:33

## 2019-02-15 RX ADMIN — Medication 20 MILLIGRAM(S): at 05:43

## 2019-02-15 RX ADMIN — Medication 40 MILLIGRAM(S): at 17:46

## 2019-02-15 RX ADMIN — FENTANYL CITRATE 44.88 MICROGRAM(S)/KG/HR: 50 INJECTION INTRAVENOUS at 20:34

## 2019-02-15 RX ADMIN — CISATRACURIUM BESYLATE 20.2 MICROGRAM(S)/KG/MIN: 2 INJECTION INTRAVENOUS at 11:31

## 2019-02-15 RX ADMIN — FENTANYL CITRATE 44.88 MICROGRAM(S)/KG/HR: 50 INJECTION INTRAVENOUS at 17:45

## 2019-02-15 RX ADMIN — SODIUM CHLORIDE 4 MILLILITER(S): 9 INJECTION INTRAMUSCULAR; INTRAVENOUS; SUBCUTANEOUS at 15:26

## 2019-02-15 RX ADMIN — CHLORHEXIDINE GLUCONATE 15 MILLILITER(S): 213 SOLUTION TOPICAL at 05:39

## 2019-02-15 RX ADMIN — Medication 20 MILLIGRAM(S): at 21:46

## 2019-02-15 RX ADMIN — Medication 1 APPLICATION(S): at 05:43

## 2019-02-15 RX ADMIN — HUMAN INSULIN 24 UNIT(S): 100 INJECTION, SUSPENSION SUBCUTANEOUS at 11:33

## 2019-02-15 RX ADMIN — PANTOPRAZOLE SODIUM 40 MILLIGRAM(S): 20 TABLET, DELAYED RELEASE ORAL at 11:33

## 2019-02-15 RX ADMIN — SODIUM CHLORIDE 4 MILLILITER(S): 9 INJECTION INTRAMUSCULAR; INTRAVENOUS; SUBCUTANEOUS at 10:15

## 2019-02-15 RX ADMIN — CHLORHEXIDINE GLUCONATE 15 MILLILITER(S): 213 SOLUTION TOPICAL at 17:46

## 2019-02-15 RX ADMIN — Medication 2: at 17:46

## 2019-02-15 RX ADMIN — HEPARIN SODIUM 7500 UNIT(S): 5000 INJECTION INTRAVENOUS; SUBCUTANEOUS at 21:44

## 2019-02-15 RX ADMIN — SODIUM CHLORIDE 4 MILLILITER(S): 9 INJECTION INTRAMUSCULAR; INTRAVENOUS; SUBCUTANEOUS at 21:22

## 2019-02-15 RX ADMIN — Medication 1 APPLICATION(S): at 17:46

## 2019-02-15 RX ADMIN — HEPARIN SODIUM 7500 UNIT(S): 5000 INJECTION INTRAVENOUS; SUBCUTANEOUS at 14:13

## 2019-02-15 RX ADMIN — Medication 1 APPLICATION(S): at 11:33

## 2019-02-15 RX ADMIN — HEPARIN SODIUM 7500 UNIT(S): 5000 INJECTION INTRAVENOUS; SUBCUTANEOUS at 05:43

## 2019-02-15 NOTE — CHART NOTE - NSCHARTNOTEFT_GEN_A_CORE
:    Chadd Alvarado MD PGY3  [facility] Pager #: [phone]  [facility] Pager #: [phone]    INDICATION:    Respiratory failure     PROCEDURE:  [x] LIMITED ECHO  [x] LIMITED CHEST  [ ] LIMITED RETROPERITONEAL  [ ] LIMITED ABDOMINAL  [ ] LIMITED DVT  [ ] NEEDLE GUIDANCE VASCULAR  [ ] NEEDLE GUIDANCE THORACENTESIS  [ ] NEEDLE GUIDANCE PARACENTESIS  [ ] NEEDLE GUIDANCE PERICARDIOCENTESIS  [ ] OTHER    FINDINGS:      Lung: B/L consolidations    Cardiac: No LV or RV dysfunction       INTERPRETATION:    Atelectasis due to prolonged ventilation :    Chadd Alvarado MD PGY3  [facility] Pager #: [phone]  [facility] Pager #: [phone]    INDICATION:    Respiratory failure     PROCEDURE:  [ ] LIMITED ECHO  [x] LIMITED CHEST  [ ] LIMITED RETROPERITONEAL  [ ] LIMITED ABDOMINAL  [ ] LIMITED DVT  [ ] NEEDLE GUIDANCE VASCULAR  [ ] NEEDLE GUIDANCE THORACENTESIS  [ ] NEEDLE GUIDANCE PARACENTESIS  [ ] NEEDLE GUIDANCE PERICARDIOCENTESIS  [ ] OTHER    FINDINGS:    Lung: B/L consolidations, left > right    INTERPRETATION:    Atelectasis due to prolonged ventilation

## 2019-02-15 NOTE — CHART NOTE - NSCHARTNOTEFT_GEN_A_CORE
Source:  nursing and EMR    Diet : NPO with tube feed - Glucerna 1.2 @ 40 ml/hr 24 hrs with No Carb Pro source 2 packs daily     Patient intubated, sedated , on propofol @ a high rate , this is suppling energy in addition to the EN . Pt. tolerating EN noted slight decrease in wt. - intentional .      Enteral : EN provides 1152 kcal & 88 gm protein/d         Current Weight: - 106.4 kg on 2/15/19; 107.9 kg on 2/8/19; Admit wt. - 112.2 kg ; IBW - 47.7 kg     Pertinent Medications: MEDICATIONS  (STANDING):  chlorhexidine 0.12% Liquid 15 milliLiter(s) Swish and Spit every 12 hours  chlorhexidine 4% Liquid 1 Application(s) Topical <User Schedule>  cisatracurium Infusion 3 MICROgram(s)/kG/Min (20.196 mL/Hr) IV Continuous <Continuous>  collagenase Ointment 1 Application(s) Topical daily  dextrose 5%. 1000 milliLiter(s) (50 mL/Hr) IV Continuous <Continuous>  dextrose 50% Injectable 12.5 Gram(s) IV Push once  dextrose 50% Injectable 25 Gram(s) IV Push once  dextrose 50% Injectable 25 Gram(s) IV Push once  fentaNYL   Infusion. 4 MICROgram(s)/kG/Hr (44.88 mL/Hr) IV Continuous <Continuous>  furosemide   Injectable 40 milliGRAM(s) IV Push every 12 hours  heparin  Injectable 7500 Unit(s) SubCutaneous every 8 hours  influenza   Vaccine 0.5 milliLiter(s) IntraMuscular once  insulin lispro (HumaLOG) corrective regimen sliding scale   SubCutaneous every 6 hours  insulin NPH human recombinant 24 Unit(s) SubCutaneous every 6 hours  levothyroxine Injectable 100 MICROGram(s) IV Push at bedtime  methylPREDNISolone sodium succinate Injectable 10 milliGRAM(s) IV Push daily  norepinephrine Infusion 0.05 MICROgram(s)/kG/Min (5.259 mL/Hr) IV Continuous <Continuous>  pantoprazole  Injectable 40 milliGRAM(s) IV Push daily  petrolatum Ophthalmic Ointment 1 Application(s) Both EYES every 12 hours  propofol Infusion 50 MICROgram(s)/kG/Min (33.66 mL/Hr) IV Continuous <Continuous>  sildenafil (REVATIO) 20 milliGRAM(s) Oral three times a day  simvastatin 20 milliGRAM(s) Oral at bedtime  sodium chloride 3%  Inhalation 4 milliLiter(s) Inhalation every 6 hours    MEDICATIONS  (PRN):  acetaminophen    Suspension .. 650 milliGRAM(s) Oral every 6 hours PRN Temp greater or equal to 38C (100.4F), Mild Pain (1 - 3), Moderate Pain (4 - 6)  dextrose 40% Gel 15 Gram(s) Oral once PRN Blood Glucose LESS THAN 70 milliGRAM(s)/deciliter  glucagon  Injectable 1 milliGRAM(s) IntraMuscular once PRN Glucose LESS THAN 70 milligrams/deciliter  glucagon  Injectable 1 milliGRAM(s) IntraMuscular once PRN Glucose LESS THAN 70 milligrams/deciliter    Pertinent Labs:  02-15 Na137 mmol/L Glu 104 mg/dL<H> K+ 3.9 mmol/L Cr  0.69 mg/dL BUN 28 mg/dL<H> 02-15 Phos 3.8 mg/dL 02-15 Alb 2.4 g/dL<L> 01-17 PAB 27 mg/dL      Skin: intact    Estimated Needs:  - no change since previous assessment      Recommend to continue EN support if consistent with GOC . Pt. is receiving excessive energy 2/2  the propofol with EN . Noted the palliative care team following and GOC being addressed , awaiting HCP decision .        Monitoring and Evaluation:  Tolerance to diet prescription , weights ,and  follow up per protocol

## 2019-02-15 NOTE — PROGRESS NOTE ADULT - SUBJECTIVE AND OBJECTIVE BOX
CHIEF COMPLAINT:    Interval Events: still on NO of 5    REVIEW OF SYSTEMS:  [x ] Unable to assess ROS because intubated/sedated    OBJECTIVE:  ICU Vital Signs Last 24 Hrs  T(C): 37.3 (15 Feb 2019 04:00), Max: 37.3 (15 Feb 2019 04:00)  T(F): 99.2 (15 Feb 2019 04:00), Max: 99.2 (15 Feb 2019 04:00)  HR: 118 (15 Feb 2019 07:12) (87 - 121)  BP: 119/67 (15 Feb 2019 07:00) (77/52 - 152/95)  BP(mean): 82 (15 Feb 2019 07:00) (59 - 112)  RR: 28 (15 Feb 2019 07:00) (25 - 35)  SpO2: 91% (15 Feb 2019 07:12) (85% - 99%)    Mode: standby, RR (machine): 28, TV (machine): 350, FiO2: 80, PEEP: 16, ITime: 1, MAP: 22, PIP: 36    02-14 @ 07:01  -  02-15 @ 07:00  --------------------------------------------------------  IN: 3420.7 mL / OUT: 3105 mL / NET: 315.7 mL    CAPILLARY BLOOD GLUCOSE    POCT Blood Glucose.: 94 mg/dL (15 Feb 2019 05:38)    PHYSICAL EXAM:  General: NAD  HEENT: NC/AT; PERRL, clear conjunctiva  Neck: supple  Respiratory: CTA b/l  Cardiovascular: +S1/S2; RRR  Abdomen: soft, NT/ND; +BS x4, L-sided abdominal dressing  Extremities: WWP, 2+ peripheral pulses b/l; anasarca reduced compared to yesterday  Skin: normal color and turgor; no rash  Neurological: unable to assess given intubation/sedation    LINES:    HOSPITAL MEDICATIONS:  Standing Meds:  chlorhexidine 0.12% Liquid 15 milliLiter(s) Swish and Spit every 12 hours  chlorhexidine 4% Liquid 1 Application(s) Topical <User Schedule>  cisatracurium Infusion 3 MICROgram(s)/kG/Min IV Continuous <Continuous>  collagenase Ointment 1 Application(s) Topical daily  dextrose 5%. 1000 milliLiter(s) IV Continuous <Continuous>  dextrose 50% Injectable 12.5 Gram(s) IV Push once  dextrose 50% Injectable 25 Gram(s) IV Push once  dextrose 50% Injectable 25 Gram(s) IV Push once  fentaNYL   Infusion. 4 MICROgram(s)/kG/Hr IV Continuous <Continuous>  furosemide   Injectable 40 milliGRAM(s) IV Push every 12 hours  heparin  Injectable 7500 Unit(s) SubCutaneous every 8 hours  influenza   Vaccine 0.5 milliLiter(s) IntraMuscular once  insulin lispro (HumaLOG) corrective regimen sliding scale   SubCutaneous every 6 hours  insulin NPH human recombinant 24 Unit(s) SubCutaneous every 6 hours  levothyroxine Injectable 100 MICROGram(s) IV Push at bedtime  methylPREDNISolone sodium succinate Injectable 10 milliGRAM(s) IV Push daily  norepinephrine Infusion 0.05 MICROgram(s)/kG/Min IV Continuous <Continuous>  pantoprazole  Injectable 40 milliGRAM(s) IV Push daily  petrolatum Ophthalmic Ointment 1 Application(s) Both EYES every 12 hours  propofol Infusion 50 MICROgram(s)/kG/Min IV Continuous <Continuous>  sildenafil (REVATIO) 20 milliGRAM(s) Oral three times a day  simvastatin 20 milliGRAM(s) Oral at bedtime  sodium chloride 3%  Inhalation 4 milliLiter(s) Inhalation every 6 hours      PRN Meds:  acetaminophen    Suspension .. 650 milliGRAM(s) Oral every 6 hours PRN  dextrose 40% Gel 15 Gram(s) Oral once PRN  glucagon  Injectable 1 milliGRAM(s) IntraMuscular once PRN  glucagon  Injectable 1 milliGRAM(s) IntraMuscular once PRN      LABS:                        7.6    10.22 )-----------( 484      ( 15 Feb 2019 02:30 )             26.3     Hgb Trend: 7.6<--, 7.6<--, 7.5<--, 7.7<--, 8.0<--  02-15    137  |  96<L>  |  28<H>  ----------------------------<  104<H>  3.9   |  24  |  0.69    Ca    8.6      15 Feb 2019 02:30  Phos  3.8     02-15  Mg     1.9     02-15    TPro  5.9<L>  /  Alb  2.4<L>  /  TBili  0.2  /  DBili  x   /  AST  36<H>  /  ALT  33  /  AlkPhos  177<H>  02-15    Creatinine Trend: 0.69<--, 0.54<--, 0.68<--, 0.64<--, 0.69<--, 0.74<--  PT/INR - ( 15 Feb 2019 02:30 )   PT: 12.7 SEC;   INR: 1.14          PTT - ( 15 Feb 2019 02:30 )  PTT:45.7 SEC  Urinalysis Basic - ( 2019 13:33 )    Color: LIGHT YELLOW / Appearance: CLEAR / S.017 / pH: 6.0  Gluc: NEGATIVE / Ketone: NEGATIVE  / Bili: NEGATIVE / Urobili: NORMAL   Blood: NEGATIVE / Protein: 10 / Nitrite: POSITIVE   Leuk Esterase: SMALL / RBC: 3-5 / WBC 11-25   Sq Epi: OCC / Non Sq Epi: x / Bacteria: NEGATIVE    Arterial Blood Gas:   @ 13:02  7.45/44/172/30/98.2/6.0  ABG lactate: 1.3  Arterial Blood Gas:   @ 13:46  7.38/52/173/28/97.9/4.7  ABG lactate: 1.2  Arterial Blood Gas:   @ 12:17  7.39/49/46/28/72.9/4.1  ABG lactate: 2.4

## 2019-02-15 NOTE — PROGRESS NOTE ADULT - ASSESSMENT
63 year old female with PMHx of HTN, DM, hypothyroidism, RA (prednisone 10mg), pulmonary HTN, CHRIS, and COPD (3L at home) who was originally admitted on 12/15 for SBO s/p ex lap with lysis of adhesions (4 retention sutures) c/b 12/30 she eviscerated 2/2 a coughing episode s/p ex lap (6 retention sutures) c/b multifocal pneumonia, developed hypercapnic respiratory failure requiring intubation 1/6, s/p bronch 1/9/18, and extubated on 1/24, course further c/b Enterococci bacteremia now readmitted to MICU for hypoxic respiratory failure 2/2 atelectasis requiring intubation 01/31    #Neuro  - Sedated   - Continue propofol and fentanyl     #Respiratory   1) Hypoxic Respiratory Failure 2/2 atelectasis requiring intubation 1/31   -Bronchoscopy showing findings concerning for hyperdynamic airway collapse.   - Patient was deconditioned and unable to sit up and participate with PT leading to a functional shunt   - At this time, will continue ventilatory support   - will diurese w/ lasix 40 IV BID ; will keep net negative 1-2L per day ; will check lytes q12 on BMP to replete as needed especially K   - c/w Sildenafil 20 TID - BPs tolerating; will de-escalate NO TODAY and off of VDR for one day; will check methemoglobin level - currently on NO 5  - will get daily ABG    #Cardiovascular  1) Cardiogenic shock 2/2 iatrogenic   - Prior bedside US showing LVOT; currently not concerned for LVOT on most recent POCUS  - off Levophed; pressures stable  - Holding amlodipine 10mg, and metoprolol 25mg BID    - c/w Simvastatin 20mg     #GI  - SBO s/p ex-lap with findings of healthy bowel c/b evisceration of bowel requiring placement of retention sutures  - NPO with tube feeds   - Continue Protonix  - will f/u with surgery regarding abdomen    #Renal/Metabolic  - Monitor for electrolyte derangements in the setting of diuresis ; replete as needed  - trend BMP for BUN/Cr    #ID   - hypothermic today to 94; fredi blood and urine cultures; will start back empiric antibiotics but no clear source  - Off antibiotics as per discussion w/ ID - had 27 days of Vanc, 3 days of Ceftriaxone, and 8 days of Zosyn for enterococcal bacteremia and Klebsiella sputum culture   - Repeat infectious work up negative: UA, RVP  - C diff negative    #Endocrine  1) Diabetes Mellitus Type 2  - NPH 28 q6; off insulin gtt  - Gabapentin 400 TID held for now     2) Hypothyroidism   - will switch to 100 mcg levothyroxine IV     # Rheumatology  1) Rheumatoid arthritis   - c/w IV solumedrol 10     #DVT ppx  - Heparin SQ     # Ethics  GOC discussion ongoing with Palliative Care team. Son (HCP) would like to give the patient time on the ventilatory before making any decisions regarding tracheostomy ; per palliative, daughter will be coming in this week to assist with decision making process

## 2019-02-16 LAB
ALBUMIN SERPL ELPH-MCNC: 2.5 G/DL — LOW (ref 3.3–5)
ALP SERPL-CCNC: 185 U/L — HIGH (ref 40–120)
ALT FLD-CCNC: 28 U/L — SIGNIFICANT CHANGE UP (ref 4–33)
ANION GAP SERPL CALC-SCNC: 16 MMO/L — HIGH (ref 7–14)
APTT BLD: 39.8 SEC — HIGH (ref 27.5–36.3)
AST SERPL-CCNC: 36 U/L — HIGH (ref 4–32)
BASOPHILS # BLD AUTO: 0.13 K/UL — SIGNIFICANT CHANGE UP (ref 0–0.2)
BASOPHILS NFR BLD AUTO: 1 % — SIGNIFICANT CHANGE UP (ref 0–2)
BILIRUB SERPL-MCNC: 0.2 MG/DL — SIGNIFICANT CHANGE UP (ref 0.2–1.2)
BUN SERPL-MCNC: 30 MG/DL — HIGH (ref 7–23)
CALCIUM SERPL-MCNC: 8.7 MG/DL — SIGNIFICANT CHANGE UP (ref 8.4–10.5)
CHLORIDE SERPL-SCNC: 99 MMOL/L — SIGNIFICANT CHANGE UP (ref 98–107)
CO2 SERPL-SCNC: 26 MMOL/L — SIGNIFICANT CHANGE UP (ref 22–31)
CREAT SERPL-MCNC: 0.68 MG/DL — SIGNIFICANT CHANGE UP (ref 0.5–1.3)
EOSINOPHIL # BLD AUTO: 0.85 K/UL — HIGH (ref 0–0.5)
EOSINOPHIL NFR BLD AUTO: 6.8 % — HIGH (ref 0–6)
GLUCOSE BLDC GLUCOMTR-MCNC: 106 MG/DL — HIGH (ref 70–99)
GLUCOSE BLDC GLUCOMTR-MCNC: 143 MG/DL — HIGH (ref 70–99)
GLUCOSE BLDC GLUCOMTR-MCNC: 159 MG/DL — HIGH (ref 70–99)
GLUCOSE BLDC GLUCOMTR-MCNC: 185 MG/DL — HIGH (ref 70–99)
GLUCOSE SERPL-MCNC: 115 MG/DL — HIGH (ref 70–99)
HCT VFR BLD CALC: 24.8 % — LOW (ref 34.5–45)
HGB BLD-MCNC: 7.3 G/DL — LOW (ref 11.5–15.5)
IMM GRANULOCYTES NFR BLD AUTO: 9 % — HIGH (ref 0–1.5)
INR BLD: 1.06 — SIGNIFICANT CHANGE UP (ref 0.88–1.17)
LYMPHOCYTES # BLD AUTO: 1.95 K/UL — SIGNIFICANT CHANGE UP (ref 1–3.3)
LYMPHOCYTES # BLD AUTO: 15.7 % — SIGNIFICANT CHANGE UP (ref 13–44)
MAGNESIUM SERPL-MCNC: 2.1 MG/DL — SIGNIFICANT CHANGE UP (ref 1.6–2.6)
MCHC RBC-ENTMCNC: 29.4 % — LOW (ref 32–36)
MCHC RBC-ENTMCNC: 31.3 PG — SIGNIFICANT CHANGE UP (ref 27–34)
MCV RBC AUTO: 106.4 FL — HIGH (ref 80–100)
MONOCYTES # BLD AUTO: 1.2 K/UL — HIGH (ref 0–0.9)
MONOCYTES NFR BLD AUTO: 9.7 % — SIGNIFICANT CHANGE UP (ref 2–14)
NEUTROPHILS # BLD AUTO: 7.16 K/UL — SIGNIFICANT CHANGE UP (ref 1.8–7.4)
NEUTROPHILS NFR BLD AUTO: 57.8 % — SIGNIFICANT CHANGE UP (ref 43–77)
NRBC # FLD: 0.92 K/UL — LOW (ref 25–125)
NRBC FLD-RTO: 7.4 — SIGNIFICANT CHANGE UP
PHOSPHATE SERPL-MCNC: 3.2 MG/DL — SIGNIFICANT CHANGE UP (ref 2.5–4.5)
PLATELET # BLD AUTO: 496 K/UL — HIGH (ref 150–400)
PMV BLD: 10 FL — SIGNIFICANT CHANGE UP (ref 7–13)
POTASSIUM SERPL-MCNC: 4 MMOL/L — SIGNIFICANT CHANGE UP (ref 3.5–5.3)
POTASSIUM SERPL-SCNC: 4 MMOL/L — SIGNIFICANT CHANGE UP (ref 3.5–5.3)
PROT SERPL-MCNC: 6 G/DL — SIGNIFICANT CHANGE UP (ref 6–8.3)
PROTHROM AB SERPL-ACNC: 12.1 SEC — SIGNIFICANT CHANGE UP (ref 9.8–13.1)
RBC # BLD: 2.33 M/UL — LOW (ref 3.8–5.2)
RBC # FLD: 20.5 % — HIGH (ref 10.3–14.5)
SODIUM SERPL-SCNC: 141 MMOL/L — SIGNIFICANT CHANGE UP (ref 135–145)
WBC # BLD: 12.41 K/UL — HIGH (ref 3.8–10.5)
WBC # FLD AUTO: 12.41 K/UL — HIGH (ref 3.8–10.5)

## 2019-02-16 PROCEDURE — 99291 CRITICAL CARE FIRST HOUR: CPT

## 2019-02-16 RX ORDER — FUROSEMIDE 40 MG
40 TABLET ORAL ONCE
Qty: 0 | Refills: 0 | Status: COMPLETED | OUTPATIENT
Start: 2019-02-16 | End: 2019-02-16

## 2019-02-16 RX ORDER — DEXMEDETOMIDINE HYDROCHLORIDE IN 0.9% SODIUM CHLORIDE 4 UG/ML
0.2 INJECTION INTRAVENOUS
Qty: 200 | Refills: 0 | Status: DISCONTINUED | OUTPATIENT
Start: 2019-02-16 | End: 2019-02-27

## 2019-02-16 RX ADMIN — SODIUM CHLORIDE 4 MILLILITER(S): 9 INJECTION INTRAMUSCULAR; INTRAVENOUS; SUBCUTANEOUS at 10:51

## 2019-02-16 RX ADMIN — CHLORHEXIDINE GLUCONATE 15 MILLILITER(S): 213 SOLUTION TOPICAL at 17:50

## 2019-02-16 RX ADMIN — FENTANYL CITRATE 44.88 MICROGRAM(S)/KG/HR: 50 INJECTION INTRAVENOUS at 20:33

## 2019-02-16 RX ADMIN — CHLORHEXIDINE GLUCONATE 1 APPLICATION(S): 213 SOLUTION TOPICAL at 12:06

## 2019-02-16 RX ADMIN — PROPOFOL 33.66 MICROGRAM(S)/KG/MIN: 10 INJECTION, EMULSION INTRAVENOUS at 20:33

## 2019-02-16 RX ADMIN — SODIUM CHLORIDE 4 MILLILITER(S): 9 INJECTION INTRAMUSCULAR; INTRAVENOUS; SUBCUTANEOUS at 22:04

## 2019-02-16 RX ADMIN — HUMAN INSULIN 24 UNIT(S): 100 INJECTION, SUSPENSION SUBCUTANEOUS at 17:49

## 2019-02-16 RX ADMIN — HUMAN INSULIN 24 UNIT(S): 100 INJECTION, SUSPENSION SUBCUTANEOUS at 12:06

## 2019-02-16 RX ADMIN — Medication 5.26 MICROGRAM(S)/KG/MIN: at 14:01

## 2019-02-16 RX ADMIN — DEXMEDETOMIDINE HYDROCHLORIDE IN 0.9% SODIUM CHLORIDE 5.61 MICROGRAM(S)/KG/HR: 4 INJECTION INTRAVENOUS at 20:33

## 2019-02-16 RX ADMIN — SIMVASTATIN 20 MILLIGRAM(S): 20 TABLET, FILM COATED ORAL at 21:48

## 2019-02-16 RX ADMIN — Medication 40 MILLIGRAM(S): at 17:48

## 2019-02-16 RX ADMIN — PROPOFOL 33.66 MICROGRAM(S)/KG/MIN: 10 INJECTION, EMULSION INTRAVENOUS at 08:49

## 2019-02-16 RX ADMIN — HEPARIN SODIUM 7500 UNIT(S): 5000 INJECTION INTRAVENOUS; SUBCUTANEOUS at 05:59

## 2019-02-16 RX ADMIN — Medication 5.26 MICROGRAM(S)/KG/MIN: at 17:48

## 2019-02-16 RX ADMIN — DEXMEDETOMIDINE HYDROCHLORIDE IN 0.9% SODIUM CHLORIDE 5.61 MICROGRAM(S)/KG/HR: 4 INJECTION INTRAVENOUS at 14:01

## 2019-02-16 RX ADMIN — FENTANYL CITRATE 44.88 MICROGRAM(S)/KG/HR: 50 INJECTION INTRAVENOUS at 17:48

## 2019-02-16 RX ADMIN — Medication 20 MILLIGRAM(S): at 21:48

## 2019-02-16 RX ADMIN — FENTANYL CITRATE 44.88 MICROGRAM(S)/KG/HR: 50 INJECTION INTRAVENOUS at 08:49

## 2019-02-16 RX ADMIN — HUMAN INSULIN 24 UNIT(S): 100 INJECTION, SUSPENSION SUBCUTANEOUS at 00:25

## 2019-02-16 RX ADMIN — Medication 2: at 12:05

## 2019-02-16 RX ADMIN — Medication 100 MICROGRAM(S): at 21:49

## 2019-02-16 RX ADMIN — HEPARIN SODIUM 7500 UNIT(S): 5000 INJECTION INTRAVENOUS; SUBCUTANEOUS at 21:48

## 2019-02-16 RX ADMIN — HEPARIN SODIUM 7500 UNIT(S): 5000 INJECTION INTRAVENOUS; SUBCUTANEOUS at 14:01

## 2019-02-16 RX ADMIN — Medication 5.26 MICROGRAM(S)/KG/MIN: at 00:35

## 2019-02-16 RX ADMIN — SODIUM CHLORIDE 4 MILLILITER(S): 9 INJECTION INTRAMUSCULAR; INTRAVENOUS; SUBCUTANEOUS at 03:41

## 2019-02-16 RX ADMIN — Medication 10 MILLIGRAM(S): at 06:00

## 2019-02-16 RX ADMIN — Medication 40 MILLIGRAM(S): at 09:15

## 2019-02-16 RX ADMIN — Medication 1 APPLICATION(S): at 12:06

## 2019-02-16 RX ADMIN — Medication 20 MILLIGRAM(S): at 05:59

## 2019-02-16 RX ADMIN — Medication 5.26 MICROGRAM(S)/KG/MIN: at 20:33

## 2019-02-16 RX ADMIN — PANTOPRAZOLE SODIUM 40 MILLIGRAM(S): 20 TABLET, DELAYED RELEASE ORAL at 12:06

## 2019-02-16 RX ADMIN — CHLORHEXIDINE GLUCONATE 15 MILLILITER(S): 213 SOLUTION TOPICAL at 05:59

## 2019-02-16 RX ADMIN — Medication 2: at 17:49

## 2019-02-16 RX ADMIN — PROPOFOL 33.66 MICROGRAM(S)/KG/MIN: 10 INJECTION, EMULSION INTRAVENOUS at 17:47

## 2019-02-16 RX ADMIN — HUMAN INSULIN 24 UNIT(S): 100 INJECTION, SUSPENSION SUBCUTANEOUS at 06:14

## 2019-02-16 RX ADMIN — Medication 20 MILLIGRAM(S): at 14:02

## 2019-02-16 RX ADMIN — DEXMEDETOMIDINE HYDROCHLORIDE IN 0.9% SODIUM CHLORIDE 5.61 MICROGRAM(S)/KG/HR: 4 INJECTION INTRAVENOUS at 17:47

## 2019-02-16 RX ADMIN — Medication 40 MILLIGRAM(S): at 06:01

## 2019-02-16 RX ADMIN — Medication 1 APPLICATION(S): at 17:49

## 2019-02-16 RX ADMIN — Medication 1 APPLICATION(S): at 06:00

## 2019-02-16 RX ADMIN — SODIUM CHLORIDE 4 MILLILITER(S): 9 INJECTION INTRAMUSCULAR; INTRAVENOUS; SUBCUTANEOUS at 16:00

## 2019-02-16 NOTE — PROGRESS NOTE ADULT - ATTENDING COMMENTS
Whitley: I have seen and examined the patient face to face, have reviewed and addended the HPI, PE and a/p as necessary.    63 year old female with PMHx of HTN, DM, hypothyroidism, RA (prednisone 10mg), pulmonary HTN, CHRIS, and COPD (3L at home) who was originally admitted on 12/15 for SBO s/p ex lap with lysis of adhesions (4 retention sutures) c/b 12/30 she eviscerated 2/2 a coughing episode s/p ex lap (6 retention sutures) c/b multifocal pneumonia, developed hypercapnic respiratory failure requiring intubation 1/6, s/p bronch 1/9/18, and extubated on 1/24, course further c/b Enterococci bacteremia now readmitted to MICU for hypoxic respiratory failure 2/2 atelectasis requiring intubation 01/31.  Weaned off nitric oxide yesterday successfully, now on sildenafil tolerating well.    Neuro: Sedated and intubated. Continue with prop/fent/precedex. wean propofol as tolerated  CV: Shock state currently off levophed, will maintain MAP >65,  hold antihypertensive meds  Pulm: Intubated with hypoxic respiratory failure since 1/31, remains severely deconditioned with hypoxic respiratory failure.  Continue to diurese, now off Nitric oxide saturating well.  Continue with sildenafil.   GI: S/p SBO with ex-lap ccb evisceration; NPO with tube feeds continue with protonix  Renal/Metabolic: Continue with diuresis net neg of 1-2L.    ID: Hold antiobtics for now, completed course of vanc and zosyn for enterococcal bactermia and kleb in sputum.   Endo: NPH 24 units q6h ISS,  cont with levothyroxine  Skin: Remove central line today  Dispo: Ongoing goals of care discussion.  Discussion of tracheostomy given persistent respiratory failure with failure to wean given hypoxia will be required.  Awaiting for daughter.  Discussed with son Norris current changes in management at bedside.  Will discuss GOC when daughter arrives.      Critical Care Time Spent 45 minutes

## 2019-02-16 NOTE — PROGRESS NOTE ADULT - ASSESSMENT
63 year old female with PMHx of HTN, DM, hypothyroidism, RA (prednisone 10mg), pulmonary HTN, CHRIS, and COPD (3L at home) who was originally admitted on 12/15 for SBO s/p ex lap with lysis of adhesions (4 retention sutures) c/b 12/30 she eviscerated 2/2 a coughing episode s/p ex lap (6 retention sutures) c/b multifocal pneumonia, developed hypercapnic respiratory failure requiring intubation 1/6, s/p bronch 1/9/18, and extubated on 1/24, course further c/b Enterococci bacteremia now readmitted to MICU for hypoxic respiratory failure 2/2 atelectasis requiring intubation 01/31    #Neuro  - Sedated   - Continue propofol and fentanyl     #Respiratory   1) Hypoxic Respiratory Failure 2/2 atelectasis requiring intubation 1/31   -Bronchoscopy showing findings concerning for hyperdynamic airway collapse.   - Patient was deconditioned and unable to sit up and participate with PT leading to a functional shunt   - At this time, will continue ventilatory support   - will diurese w/ lasix 40 IV BID ; will keep net negative 1-2L per day ; will check lytes q12 on BMP to replete as needed especially K   - c/w Sildenafil 20 TID - BPs tolerating; Off NO.    #Cardiovascular  1) Cardiogenic shock 2/2 iatrogenic   - Prior bedside US showing LVOT; currently not concerned for LVOT on most recent POCUS  - off Levophed; pressures stable  - Holding amlodipine 10mg, and metoprolol 25mg BID    - c/w Simvastatin 20mg     #GI  - SBO s/p ex-lap with findings of healthy bowel c/b evisceration of bowel requiring placement of retention sutures  - NPO with tube feeds   - Continue Protonix    #Renal/Metabolic  - Monitor for electrolyte derangements in the setting of diuresis ; replete as needed  - trend BMP for BUN/Cr    #ID   - hypothermic today to 94; fredi blood and urine cultures; will start back empiric antibiotics but no clear source  - Off antibiotics as per discussion w/ ID - had 27 days of Vanc, 3 days of Ceftriaxone, and 8 days of Zosyn for enterococcal bacteremia and Klebsiella sputum culture   - Repeat infectious work up negative: UA, RVP  - C diff negative    #Endocrine  1) Diabetes Mellitus Type 2  - NPH 24 q6; off insulin gtt  - Gabapentin 400 TID held for now     2) Hypothyroidism   - cw 100 mcg levothyroxine IV     # Rheumatology  1) Rheumatoid arthritis   - c/w IV solumedrol 10     #DVT ppx  - Heparin SQ     # Ethics  GOC discussion ongoing with Palliative Care team. Son (HCP) would like to give the patient time on the ventilatory before making any decisions regarding tracheostomy ; per palliative, daughter will be coming in this week to assist with decision making process 63 year old female with PMHx of HTN, DM, hypothyroidism, RA (prednisone 10mg), pulmonary HTN, CHRIS, and COPD (3L at home) who was originally admitted on 12/15 for SBO s/p ex lap with lysis of adhesions (4 retention sutures) c/b 12/30 she eviscerated 2/2 a coughing episode s/p ex lap (6 retention sutures) c/b multifocal pneumonia, developed hypercapnic respiratory failure requiring intubation 1/6, s/p bronch 1/9/18, and extubated on 1/24, course further c/b Enterococci bacteremia now readmitted to MICU for hypoxic respiratory failure 2/2 atelectasis requiring intubation 01/31    #Neuro  - Sedated   - Continue propofol and fentanyl, precedex.    #Respiratory   1) Hypoxic Respiratory Failure 2/2 atelectasis requiring intubation 1/31   -Bronchoscopy showing findings concerning for hyperdynamic airway collapse.   - Patient was deconditioned and unable to sit up and participate with PT leading to a functional shunt   - At this time, will continue ventilatory support   - will diurese w/ lasix 40 IV BID ; will keep net negative 1-2L per day ; will check lytes q12 on BMP to replete as needed especially K   - c/w Sildenafil 20 TID - BPs tolerating; Off NO.    #Cardiovascular  1) Cardiogenic shock 2/2 iatrogenic   - Prior bedside US showing LVOT; currently not concerned for LVOT on most recent POCUS  - off Levophed; pressures stable  - Holding amlodipine 10mg, and metoprolol 25mg BID    - c/w Simvastatin 20mg     #GI  - SBO s/p ex-lap with findings of healthy bowel c/b evisceration of bowel requiring placement of retention sutures  - NPO with tube feeds   - Continue Protonix    #Renal/Metabolic  - Monitor for electrolyte derangements in the setting of diuresis ; replete as needed  - trend BMP for BUN/Cr    #ID   - Off antibiotics as per discussion w/ ID - had 27 days of Vanc, 3 days of Ceftriaxone, and 8 days of Zosyn for enterococcal bacteremia and Klebsiella sputum culture   - C diff negative  - DC central line today    #Endocrine  1) Diabetes Mellitus Type 2  - NPH 24 q6; off insulin gtt  - Gabapentin 400 TID held for now     2) Hypothyroidism   - cw 100 mcg levothyroxine IV     # Rheumatology  1) Rheumatoid arthritis   - Titrate off steroids.    #DVT ppx  - Heparin SQ     # Ethics  GOC discussion ongoing with Palliative Care team. Son (HCP) would like to give the patient time on the ventilatory before making any decisions regarding tracheostomy ; per palliative, daughter will be coming in this week to assist with decision making process

## 2019-02-16 NOTE — PROGRESS NOTE ADULT - SUBJECTIVE AND OBJECTIVE BOX
CHIEF COMPLAINT: Hypoxic respiratory failure.    Interval Events: Titrated off the nitrous oxide.    REVIEW OF SYSTEMS:  Constitutional:   Eyes:  ENT:  CV:  Resp:  GI:  :  MSK:  Integumentary:  Neurological:  Psychiatric:  Endocrine:  Hematologic/Lymphatic:  Allergic/Immunologic:  [ ] All other systems negative  [x] Unable to assess ROS because Unable to assess due to intubation    OBJECTIVE:  ICU Vital Signs Last 24 Hrs  T(C): 36.4 (2019 04:00), Max: 37.5 (15 Feb 2019 08:00)  T(F): 97.6 (2019 04:00), Max: 99.5 (15 Feb 2019 08:00)  HR: 111 (2019 06:00) (81 - 125)  BP: 133/81 (2019 06:00) (61/41 - 158/94)  BP(mean): 97 (2019 06:00) (49 - 112)  ABP: --  ABP(mean): --  RR: 29 (2019 06:00) (16 - 29)  SpO2: 92% (2019 06:00) (91% - 100%)    Mode: AC/ CMV (Assist Control/ Continuous Mandatory Ventilation), RR (machine): 28, TV (machine): 350, FiO2: 100, PEEP: 16, ITime: 1, MAP: 22, PIP: 39    02-15 @ 07:01  -  02-16 @ 07:00  --------------------------------------------------------  IN: 3271.1 mL / OUT: 4025 mL / NET: -753.9 mL      CAPILLARY BLOOD GLUCOSE      POCT Blood Glucose.: 143 mg/dL (2019 06:12)      PHYSICAL EXAM:  General: NAD  HEENT: NCAT, Sclera nonicteric, PERRLA  Lymph Nodes: No LAD  Neck: ET tube inplace. Trachea midline, thyroid non palpable.  Respiratory:  CTAB, no wheezes, rales or rhonchi.   Cardiovascular: Normal S1, S2, no murmurs rubs or gallops  Abdomen: Soft, Nondistended, Nontender. +BS x4.  Extremities: No clubbing, cyanosis or edema.  Skin: Clean, Dry Intact.  Neurological: Intubated.  Psychiatry: Intubated.    HOSPITAL MEDICATIONS:  MEDICATIONS  (STANDING):  chlorhexidine 0.12% Liquid 15 milliLiter(s) Swish and Spit every 12 hours  chlorhexidine 4% Liquid 1 Application(s) Topical <User Schedule>  collagenase Ointment 1 Application(s) Topical daily  dextrose 5%. 1000 milliLiter(s) (50 mL/Hr) IV Continuous <Continuous>  dextrose 50% Injectable 12.5 Gram(s) IV Push once  dextrose 50% Injectable 25 Gram(s) IV Push once  dextrose 50% Injectable 25 Gram(s) IV Push once  fentaNYL   Infusion. 4 MICROgram(s)/kG/Hr (44.88 mL/Hr) IV Continuous <Continuous>  furosemide   Injectable 40 milliGRAM(s) IV Push every 12 hours  heparin  Injectable 7500 Unit(s) SubCutaneous every 8 hours  influenza   Vaccine 0.5 milliLiter(s) IntraMuscular once  insulin lispro (HumaLOG) corrective regimen sliding scale   SubCutaneous every 6 hours  insulin NPH human recombinant 24 Unit(s) SubCutaneous every 6 hours  levothyroxine Injectable 100 MICROGram(s) IV Push at bedtime  methylPREDNISolone sodium succinate Injectable 10 milliGRAM(s) IV Push daily  norepinephrine Infusion 0.05 MICROgram(s)/kG/Min (5.259 mL/Hr) IV Continuous <Continuous>  pantoprazole  Injectable 40 milliGRAM(s) IV Push daily  petrolatum Ophthalmic Ointment 1 Application(s) Both EYES every 12 hours  propofol Infusion 50 MICROgram(s)/kG/Min (33.66 mL/Hr) IV Continuous <Continuous>  sildenafil (REVATIO) 20 milliGRAM(s) Oral three times a day  simvastatin 20 milliGRAM(s) Oral at bedtime  sodium chloride 3%  Inhalation 4 milliLiter(s) Inhalation every 6 hours    MEDICATIONS  (PRN):  acetaminophen    Suspension .. 650 milliGRAM(s) Oral every 6 hours PRN Temp greater or equal to 38C (100.4F), Mild Pain (1 - 3), Moderate Pain (4 - 6)  dextrose 40% Gel 15 Gram(s) Oral once PRN Blood Glucose LESS THAN 70 milliGRAM(s)/deciliter  glucagon  Injectable 1 milliGRAM(s) IntraMuscular once PRN Glucose LESS THAN 70 milligrams/deciliter  glucagon  Injectable 1 milliGRAM(s) IntraMuscular once PRN Glucose LESS THAN 70 milligrams/deciliter      LABS:                        7.3    12.41 )-----------( 496      ( 2019 03:30 )             24.8         141  |  99  |  30<H>  ----------------------------<  115<H>  4.0   |  26  |  0.68    Ca    8.7      2019 03:30  Phos  3.2       Mg     2.1         TPro  6.0  /  Alb  2.5<L>  /  TBili  0.2  /  DBili  x   /  AST  36<H>  /  ALT  28  /  AlkPhos  185<H>      PT/INR - ( 2019 03:30 )   PT: 12.1 SEC;   INR: 1.06          PTT - ( 2019 03:30 )  PTT:39.8 SEC  Urinalysis Basic - ( 2019 13:33 )    Color: LIGHT YELLOW / Appearance: CLEAR / S.017 / pH: 6.0  Gluc: NEGATIVE / Ketone: NEGATIVE  / Bili: NEGATIVE / Urobili: NORMAL   Blood: NEGATIVE / Protein: 10 / Nitrite: POSITIVE   Leuk Esterase: SMALL / RBC: 3-5 / WBC 11-25   Sq Epi: OCC / Non Sq Epi: x / Bacteria: NEGATIVE      Arterial Blood Gas:  02-15 @ 13:31  7.32/60/70/28/91.8/4.4  ABG lactate: 1.1  Arterial Blood Gas:   @ 13:02  7.45/44/172/30/98.2/6.0  ABG lactate: 1.3        MICROBIOLOGY:     Culture - Blood (19 @ 15:36)    Culture - Blood:   NO ORGANISMS ISOLATED  NO ORGANISMS ISOLATED AT 24 HOURS    Specimen Source: BLOOD VENOUS    Culture - Urine (19 @ 15:24)    Culture - Urine:   Insufficient growth, culture re-incubated.    Specimen Source: URINE CATHETER        Culture - Respiratory with Gram Stain (19 @ 11:48)    Culture - Respiratory:   KLP^Klebsiella pneumoniae  QUANTITY OF GROWTH: MODERATE    Culture - Respiratory:   Normal Respiratory Perla Also Present    -  Tobramycin: S <=2 RON    -  Trimethoprim/Sulfamethoxazole: R >2/38 RON    -  Tigecycline: S 2 RON    -  Piperacillin/Tazobactam: S <=8 RON    -  Imipenem: S <=1 RON    -  Levofloxacin: S 2 RON    -  Meropenem: S <=1 RON    -  Ampicillin/Sulbactam: R >16/8 RON    -  Ceftriaxone: S <=1 RON    -  Ampicillin: R >16 RON    -  Cefazolin: R 16 RON    -  Cefoxitin: I 16 RON    -  Ceftazidime: S <=1 RON    -  Amikacin: S <=8 RON    -  Aztreonam: S <=4 RON    -  Cefepime: S <=2 RON    -  Ciprofloxacin: S 1 RON    -  Gentamicin: S <=1 RON    -  Ertapenem: S <=0.5 RON    Gram Stain Sputum:   GPCPR Gram Pos Cocci in Pairs  QUANTITY OF BACTERIA SEEN: RARE (1+)  WBC^White Blood Cells  QNTY CELLS IN GRAM STAIN: MODERATE (3+)    Specimen Source: ENDOTRACHEAL SPECIMEN    Organism Identification: Klebsiella pneumoniae    Organism: Klebsiella pneumoniae  QUANTITY OF GROWTH: MODERATE    Method Type: NEGATIVE RON 43        RADIOLOGY:< from: Xray Chest 1 View- PORTABLE-Urgent (19 @ 21:34) >  IMPRESSION:  Follow-up with increasing layering right effusion, smaller   lefteffusion with underlying atelectasis.    < end of copied text >    [ ] Reviewed and interpreted by me    EKG:    ASSESSMENT AND PLAN:

## 2019-02-17 LAB
-  AMIKACIN: SIGNIFICANT CHANGE UP
-  AMPICILLIN/SULBACTAM: SIGNIFICANT CHANGE UP
-  AMPICILLIN: SIGNIFICANT CHANGE UP
-  AZTREONAM: SIGNIFICANT CHANGE UP
-  CEFAZOLIN: SIGNIFICANT CHANGE UP
-  CEFEPIME: SIGNIFICANT CHANGE UP
-  CEFOXITIN: SIGNIFICANT CHANGE UP
-  CEFTAZIDIME: SIGNIFICANT CHANGE UP
-  CEFTRIAXONE: SIGNIFICANT CHANGE UP
-  CIPROFLOXACIN: SIGNIFICANT CHANGE UP
-  ERTAPENEM: SIGNIFICANT CHANGE UP
-  GENTAMICIN: SIGNIFICANT CHANGE UP
-  IMIPENEM: SIGNIFICANT CHANGE UP
-  LEVOFLOXACIN: SIGNIFICANT CHANGE UP
-  MEROPENEM: SIGNIFICANT CHANGE UP
-  NITROFURANTOIN: SIGNIFICANT CHANGE UP
-  PIPERACILLIN/TAZOBACTAM: SIGNIFICANT CHANGE UP
-  TIGECYCLINE: SIGNIFICANT CHANGE UP
-  TOBRAMYCIN: SIGNIFICANT CHANGE UP
-  TRIMETHOPRIM/SULFAMETHOXAZOLE: SIGNIFICANT CHANGE UP
ALBUMIN SERPL ELPH-MCNC: 2.4 G/DL — LOW (ref 3.3–5)
ALP SERPL-CCNC: 187 U/L — HIGH (ref 40–120)
ALT FLD-CCNC: 28 U/L — SIGNIFICANT CHANGE UP (ref 4–33)
ANION GAP SERPL CALC-SCNC: 14 MMO/L — SIGNIFICANT CHANGE UP (ref 7–14)
AST SERPL-CCNC: 46 U/L — HIGH (ref 4–32)
BACTERIA UR CULT: SIGNIFICANT CHANGE UP
BASOPHILS # BLD AUTO: 0.1 K/UL — SIGNIFICANT CHANGE UP (ref 0–0.2)
BASOPHILS NFR BLD AUTO: 0.8 % — SIGNIFICANT CHANGE UP (ref 0–2)
BILIRUB SERPL-MCNC: 0.2 MG/DL — SIGNIFICANT CHANGE UP (ref 0.2–1.2)
BUN SERPL-MCNC: 27 MG/DL — HIGH (ref 7–23)
CALCIUM SERPL-MCNC: 8.9 MG/DL — SIGNIFICANT CHANGE UP (ref 8.4–10.5)
CHLORIDE SERPL-SCNC: 103 MMOL/L — SIGNIFICANT CHANGE UP (ref 98–107)
CO2 SERPL-SCNC: 28 MMOL/L — SIGNIFICANT CHANGE UP (ref 22–31)
CREAT SERPL-MCNC: 0.62 MG/DL — SIGNIFICANT CHANGE UP (ref 0.5–1.3)
EOSINOPHIL # BLD AUTO: 0.59 K/UL — HIGH (ref 0–0.5)
EOSINOPHIL NFR BLD AUTO: 5 % — SIGNIFICANT CHANGE UP (ref 0–6)
GLUCOSE BLDC GLUCOMTR-MCNC: 103 MG/DL — HIGH (ref 70–99)
GLUCOSE BLDC GLUCOMTR-MCNC: 125 MG/DL — HIGH (ref 70–99)
GLUCOSE BLDC GLUCOMTR-MCNC: 201 MG/DL — HIGH (ref 70–99)
GLUCOSE BLDC GLUCOMTR-MCNC: 88 MG/DL — SIGNIFICANT CHANGE UP (ref 70–99)
GLUCOSE SERPL-MCNC: 75 MG/DL — SIGNIFICANT CHANGE UP (ref 70–99)
HCT VFR BLD CALC: 23.8 % — LOW (ref 34.5–45)
HGB BLD-MCNC: 7 G/DL — CRITICAL LOW (ref 11.5–15.5)
IMM GRANULOCYTES NFR BLD AUTO: 6.9 % — HIGH (ref 0–1.5)
LYMPHOCYTES # BLD AUTO: 17.1 % — SIGNIFICANT CHANGE UP (ref 13–44)
LYMPHOCYTES # BLD AUTO: 2.03 K/UL — SIGNIFICANT CHANGE UP (ref 1–3.3)
MAGNESIUM SERPL-MCNC: 2.1 MG/DL — SIGNIFICANT CHANGE UP (ref 1.6–2.6)
MCHC RBC-ENTMCNC: 29.4 % — LOW (ref 32–36)
MCHC RBC-ENTMCNC: 30.8 PG — SIGNIFICANT CHANGE UP (ref 27–34)
MCV RBC AUTO: 104.8 FL — HIGH (ref 80–100)
METHOD TYPE: SIGNIFICANT CHANGE UP
MONOCYTES # BLD AUTO: 1.02 K/UL — HIGH (ref 0–0.9)
MONOCYTES NFR BLD AUTO: 8.6 % — SIGNIFICANT CHANGE UP (ref 2–14)
NEUTROPHILS # BLD AUTO: 7.32 K/UL — SIGNIFICANT CHANGE UP (ref 1.8–7.4)
NEUTROPHILS NFR BLD AUTO: 61.6 % — SIGNIFICANT CHANGE UP (ref 43–77)
NRBC # FLD: 0.92 K/UL — LOW (ref 25–125)
NRBC FLD-RTO: 7.7 — SIGNIFICANT CHANGE UP
ORGANISM # SPEC MICROSCOPIC CNT: SIGNIFICANT CHANGE UP
PHOSPHATE SERPL-MCNC: 3.2 MG/DL — SIGNIFICANT CHANGE UP (ref 2.5–4.5)
PLATELET # BLD AUTO: 487 K/UL — HIGH (ref 150–400)
PMV BLD: 10.4 FL — SIGNIFICANT CHANGE UP (ref 7–13)
POTASSIUM SERPL-MCNC: 4.2 MMOL/L — SIGNIFICANT CHANGE UP (ref 3.5–5.3)
POTASSIUM SERPL-SCNC: 4.2 MMOL/L — SIGNIFICANT CHANGE UP (ref 3.5–5.3)
PROT SERPL-MCNC: 6.2 G/DL — SIGNIFICANT CHANGE UP (ref 6–8.3)
RBC # BLD: 2.27 M/UL — LOW (ref 3.8–5.2)
RBC # FLD: 20.3 % — HIGH (ref 10.3–14.5)
SODIUM SERPL-SCNC: 145 MMOL/L — SIGNIFICANT CHANGE UP (ref 135–145)
WBC # BLD: 11.88 K/UL — HIGH (ref 3.8–10.5)
WBC # FLD AUTO: 11.88 K/UL — HIGH (ref 3.8–10.5)

## 2019-02-17 PROCEDURE — 99291 CRITICAL CARE FIRST HOUR: CPT

## 2019-02-17 RX ORDER — HUMAN INSULIN 100 [IU]/ML
8 INJECTION, SUSPENSION SUBCUTANEOUS EVERY 6 HOURS
Qty: 0 | Refills: 0 | Status: DISCONTINUED | OUTPATIENT
Start: 2019-02-17 | End: 2019-02-19

## 2019-02-17 RX ORDER — HUMAN INSULIN 100 [IU]/ML
16 INJECTION, SUSPENSION SUBCUTANEOUS EVERY 6 HOURS
Qty: 0 | Refills: 0 | Status: DISCONTINUED | OUTPATIENT
Start: 2019-02-17 | End: 2019-02-17

## 2019-02-17 RX ORDER — FUROSEMIDE 40 MG
40 TABLET ORAL EVERY 8 HOURS
Qty: 0 | Refills: 0 | Status: DISCONTINUED | OUTPATIENT
Start: 2019-02-17 | End: 2019-02-24

## 2019-02-17 RX ADMIN — Medication 1 APPLICATION(S): at 06:09

## 2019-02-17 RX ADMIN — HEPARIN SODIUM 7500 UNIT(S): 5000 INJECTION INTRAVENOUS; SUBCUTANEOUS at 21:30

## 2019-02-17 RX ADMIN — PROPOFOL 33.66 MICROGRAM(S)/KG/MIN: 10 INJECTION, EMULSION INTRAVENOUS at 17:53

## 2019-02-17 RX ADMIN — Medication 40 MILLIGRAM(S): at 21:30

## 2019-02-17 RX ADMIN — FENTANYL CITRATE 22.44 MICROGRAM(S)/KG/HR: 50 INJECTION INTRAVENOUS at 19:32

## 2019-02-17 RX ADMIN — Medication 100 MICROGRAM(S): at 21:51

## 2019-02-17 RX ADMIN — PROPOFOL 33.66 MICROGRAM(S)/KG/MIN: 10 INJECTION, EMULSION INTRAVENOUS at 07:38

## 2019-02-17 RX ADMIN — SIMVASTATIN 20 MILLIGRAM(S): 20 TABLET, FILM COATED ORAL at 21:30

## 2019-02-17 RX ADMIN — SODIUM CHLORIDE 4 MILLILITER(S): 9 INJECTION INTRAMUSCULAR; INTRAVENOUS; SUBCUTANEOUS at 16:45

## 2019-02-17 RX ADMIN — Medication 40 MILLIGRAM(S): at 06:06

## 2019-02-17 RX ADMIN — FENTANYL CITRATE 44.88 MICROGRAM(S)/KG/HR: 50 INJECTION INTRAVENOUS at 07:38

## 2019-02-17 RX ADMIN — CHLORHEXIDINE GLUCONATE 1 APPLICATION(S): 213 SOLUTION TOPICAL at 12:14

## 2019-02-17 RX ADMIN — PANTOPRAZOLE SODIUM 40 MILLIGRAM(S): 20 TABLET, DELAYED RELEASE ORAL at 12:13

## 2019-02-17 RX ADMIN — SODIUM CHLORIDE 4 MILLILITER(S): 9 INJECTION INTRAMUSCULAR; INTRAVENOUS; SUBCUTANEOUS at 23:13

## 2019-02-17 RX ADMIN — FENTANYL CITRATE 22.44 MICROGRAM(S)/KG/HR: 50 INJECTION INTRAVENOUS at 14:00

## 2019-02-17 RX ADMIN — SODIUM CHLORIDE 4 MILLILITER(S): 9 INJECTION INTRAMUSCULAR; INTRAVENOUS; SUBCUTANEOUS at 11:01

## 2019-02-17 RX ADMIN — HUMAN INSULIN 16 UNIT(S): 100 INJECTION, SUSPENSION SUBCUTANEOUS at 12:14

## 2019-02-17 RX ADMIN — DEXMEDETOMIDINE HYDROCHLORIDE IN 0.9% SODIUM CHLORIDE 5.61 MICROGRAM(S)/KG/HR: 4 INJECTION INTRAVENOUS at 07:38

## 2019-02-17 RX ADMIN — HEPARIN SODIUM 7500 UNIT(S): 5000 INJECTION INTRAVENOUS; SUBCUTANEOUS at 06:06

## 2019-02-17 RX ADMIN — Medication 40 MILLIGRAM(S): at 15:00

## 2019-02-17 RX ADMIN — SODIUM CHLORIDE 4 MILLILITER(S): 9 INJECTION INTRAMUSCULAR; INTRAVENOUS; SUBCUTANEOUS at 04:21

## 2019-02-17 RX ADMIN — HEPARIN SODIUM 7500 UNIT(S): 5000 INJECTION INTRAVENOUS; SUBCUTANEOUS at 15:00

## 2019-02-17 RX ADMIN — Medication 20 MILLIGRAM(S): at 06:07

## 2019-02-17 RX ADMIN — Medication 5 MILLIGRAM(S): at 06:03

## 2019-02-17 RX ADMIN — Medication 1 APPLICATION(S): at 12:13

## 2019-02-17 RX ADMIN — Medication 5.26 MICROGRAM(S)/KG/MIN: at 07:38

## 2019-02-17 RX ADMIN — CHLORHEXIDINE GLUCONATE 15 MILLILITER(S): 213 SOLUTION TOPICAL at 17:18

## 2019-02-17 RX ADMIN — DEXMEDETOMIDINE HYDROCHLORIDE IN 0.9% SODIUM CHLORIDE 5.61 MICROGRAM(S)/KG/HR: 4 INJECTION INTRAVENOUS at 19:33

## 2019-02-17 RX ADMIN — Medication 4: at 12:15

## 2019-02-17 RX ADMIN — Medication 1 APPLICATION(S): at 17:18

## 2019-02-17 RX ADMIN — HUMAN INSULIN 8 UNIT(S): 100 INJECTION, SUSPENSION SUBCUTANEOUS at 17:51

## 2019-02-17 RX ADMIN — Medication 20 MILLIGRAM(S): at 21:30

## 2019-02-17 RX ADMIN — Medication 5.26 MICROGRAM(S)/KG/MIN: at 19:32

## 2019-02-17 RX ADMIN — CHLORHEXIDINE GLUCONATE 15 MILLILITER(S): 213 SOLUTION TOPICAL at 06:06

## 2019-02-17 RX ADMIN — PROPOFOL 33.66 MICROGRAM(S)/KG/MIN: 10 INJECTION, EMULSION INTRAVENOUS at 19:32

## 2019-02-17 NOTE — PROGRESS NOTE ADULT - ASSESSMENT
63 year old female with PMHx of HTN, DM, hypothyroidism, RA (prednisone 10mg), pulmonary HTN, CHRIS, and COPD (3L at home) who was originally admitted on 12/15 for SBO s/p ex lap with lysis of adhesions (4 retention sutures) c/b 12/30 she eviscerated 2/2 a coughing episode s/p ex lap (6 retention sutures) c/b multifocal pneumonia, developed hypercapnic respiratory failure requiring intubation 1/6, s/p bronch 1/9/18, and extubated on 1/24, course further c/b Enterococci bacteremia now readmitted to MICU for hypoxic respiratory failure 2/2 atelectasis requiring intubation 01/31    #Neuro  - Sedated   - Continue propofol and fentanyl, precedex.    #Respiratory   1) Hypoxic Respiratory Failure 2/2 atelectasis requiring intubation 1/31   -Bronchoscopy showing findings concerning for hyperdynamic airway collapse.   - Patient was deconditioned and unable to sit up and participate with PT leading to a functional shunt   - At this time, will continue ventilatory support   - will diurese w/ lasix 40 IV BID ; will keep net negative 1-2L per day ; will check lytes q12 on BMP to replete as needed especially K   - c/w Sildenafil 20 TID - BPs tolerating; Off NO; on 100% FiO2    #Cardiovascular  1) Cardiogenic shock 2/2 iatrogenic   - Prior bedside US showing LVOT; currently not concerned for LVOT on most recent POCUS  - off Levophed; pressures stable  - Holding amlodipine 10mg, and metoprolol 25mg BID    - c/w Simvastatin 20mg     #GI  - SBO s/p ex-lap with findings of healthy bowel c/b evisceration of bowel requiring placement of retention sutures  - NPO with tube feeds   - Continue Protonix    #Renal/Metabolic  - Monitor for electrolyte derangements in the setting of diuresis ; replete as needed  - trend BMP for BUN/Cr    #ID   - Off antibiotics as per discussion w/ ID - had 27 days of Vanc, 3 days of Ceftriaxone, and 8 days of Zosyn for enterococcal bacteremia and Klebsiella sputum culture   - C diff negative  - Central line DC'D     #Endocrine  1) Diabetes Mellitus Type 2  - NPH 24 q6; off insulin gtt  - Gabapentin 400 TID held for now     2) Hypothyroidism   - cw 100 mcg levothyroxine IV     # Rheumatology  1) Rheumatoid arthritis   - Titrate off steroids - IV Dex 5 for 7 days; day 2     #DVT ppx  - Heparin SQ     # Ethics  GOC discussion ongoing with Palliative Care team. Son (HCP) would like to give the patient time on the ventilatory before making any decisions regarding tracheostomy ; per palliative, daughter will be coming in this week to assist with decision making process

## 2019-02-17 NOTE — PROGRESS NOTE ADULT - SUBJECTIVE AND OBJECTIVE BOX
CHIEF COMPLAINT:    Interval Events: NPH decreased w/ steroid taper     REVIEW OF SYSTEMS:  [x ] Unable to assess ROS because intubated/sedated    OBJECTIVE:  ICU Vital Signs Last 24 Hrs  T(C): 36.6 (17 Feb 2019 04:00), Max: 36.8 (16 Feb 2019 12:00)  T(F): 97.9 (17 Feb 2019 04:00), Max: 98.3 (16 Feb 2019 16:00)  HR: 68 (17 Feb 2019 07:00) (64 - 113)  BP: 88/49 (17 Feb 2019 07:00) (77/51 - 133/86)  BP(mean): 62 (17 Feb 2019 07:00) (59 - 101)  RR: 28 (17 Feb 2019 07:00) (23 - 50)  SpO2: 96% (17 Feb 2019 07:00) (91% - 100%)    Mode: AC/ CMV (Assist Control/ Continuous Mandatory Ventilation), RR (machine): 28, TV (machine): 350, FiO2: 80, PEEP: 16, MAP: 21, PIP: 38    02-16 @ 07:01  -  02-17 @ 07:00  --------------------------------------------------------  IN: 3003 mL / OUT: 3790 mL / NET: -787 mL    CAPILLARY BLOOD GLUCOSE    POCT Blood Glucose.: 103 mg/dL (17 Feb 2019 06:12)    PHYSICAL EXAM:  General: NAD  HEENT: NCAT, Sclera nonicteric, PERRLA  Lymph Nodes: No LAD  Neck: ET tube inplace. Trachea midline, thyroid non palpable.  Respiratory:  CTAB, no wheezes, rales or rhonchi.   Cardiovascular: Normal S1, S2, no murmurs rubs or gallops  Abdomen: Soft, Nondistended, Nontender. +BS x4.  Extremities: No clubbing, cyanosis or edema.  Skin: Clean, Dry Intact.  Neurological: Intubated.  Psychiatry: Intubated.    LINES:    HOSPITAL MEDICATIONS:  Standing Meds:  chlorhexidine 0.12% Liquid 15 milliLiter(s) Swish and Spit every 12 hours  chlorhexidine 4% Liquid 1 Application(s) Topical <User Schedule>  collagenase Ointment 1 Application(s) Topical daily  dexmedetomidine Infusion 0.2 MICROgram(s)/kG/Hr IV Continuous <Continuous>  dextrose 5%. 1000 milliLiter(s) IV Continuous <Continuous>  dextrose 50% Injectable 12.5 Gram(s) IV Push once  dextrose 50% Injectable 25 Gram(s) IV Push once  dextrose 50% Injectable 25 Gram(s) IV Push once  fentaNYL   Infusion. 4 MICROgram(s)/kG/Hr IV Continuous <Continuous>  furosemide   Injectable 40 milliGRAM(s) IV Push every 12 hours  heparin  Injectable 7500 Unit(s) SubCutaneous every 8 hours  influenza   Vaccine 0.5 milliLiter(s) IntraMuscular once  insulin lispro (HumaLOG) corrective regimen sliding scale   SubCutaneous every 6 hours  insulin NPH human recombinant 16 Unit(s) SubCutaneous every 6 hours  levothyroxine Injectable 100 MICROGram(s) IV Push at bedtime  methylPREDNISolone sodium succinate Injectable 5 milliGRAM(s) IV Push daily  norepinephrine Infusion 0.05 MICROgram(s)/kG/Min IV Continuous <Continuous>  pantoprazole  Injectable 40 milliGRAM(s) IV Push daily  petrolatum Ophthalmic Ointment 1 Application(s) Both EYES every 12 hours  propofol Infusion 50 MICROgram(s)/kG/Min IV Continuous <Continuous>  sildenafil (REVATIO) 20 milliGRAM(s) Oral three times a day  simvastatin 20 milliGRAM(s) Oral at bedtime  sodium chloride 3%  Inhalation 4 milliLiter(s) Inhalation every 6 hours      PRN Meds:  acetaminophen    Suspension .. 650 milliGRAM(s) Oral every 6 hours PRN  dextrose 40% Gel 15 Gram(s) Oral once PRN  glucagon  Injectable 1 milliGRAM(s) IntraMuscular once PRN  glucagon  Injectable 1 milliGRAM(s) IntraMuscular once PRN      LABS:                        7.0    11.88 )-----------( 487      ( 17 Feb 2019 02:30 )             23.8     Hgb Trend: 7.0<--, 7.3<--, 7.6<--, 7.6<--, 7.5<--  02-17    145  |  103  |  27<H>  ----------------------------<  75  4.2   |  28  |  0.62    Ca    8.9      17 Feb 2019 02:30  Phos  3.2     02-17  Mg     2.1     02-17    TPro  6.2  /  Alb  2.4<L>  /  TBili  0.2  /  DBili  x   /  AST  46<H>  /  ALT  28  /  AlkPhos  187<H>  02-17    Creatinine Trend: 0.62<--, 0.68<--, 0.69<--, 0.54<--, 0.68<--, 0.64<--  PT/INR - ( 16 Feb 2019 03:30 )   PT: 12.1 SEC;   INR: 1.06          PTT - ( 16 Feb 2019 03:30 )  PTT:39.8 SEC    Arterial Blood Gas:  02-15 @ 13:31  7.32/60/70/28/91.8/4.4  ABG lactate: 1.1    MICROBIOLOGY:     Culture - Blood (collected 14 Feb 2019 15:36)  Source: BLOOD VENOUS  Preliminary Report (16 Feb 2019 15:34):    NO ORGANISMS ISOLATED    NO ORGANISMS ISOLATED AT 48 HRS.    Culture - Blood (collected 14 Feb 2019 15:36)  Source: BLOOD PERIPHERAL  Preliminary Report (16 Feb 2019 15:34):    NO ORGANISMS ISOLATED    NO ORGANISMS ISOLATED AT 48 HRS.    Culture - Urine (collected 14 Feb 2019 15:24)  Source: URINE CATHETER  Preliminary Report (16 Feb 2019 12:35):    EC^Escherichia coli    COLONY COUNT: 10,000-49,000 CFU/mL    CALB^Candida albicans    COLONY COUNT: 50,000-99,000 CFU/mL  Preliminary Report (15 Feb 2019 07:47):    Insufficient growth, culture re-incubated.      RADIOLOGY:  [ ] Reviewed and interpreted by me    EKG:

## 2019-02-17 NOTE — PROGRESS NOTE ADULT - ATTENDING COMMENTS
Whitley: I have seen and examined the patient face to face, have reviewed and addended the HPI, PE and a/p as necessary.    63 year old female with PMHx of HTN, DM, hypothyroidism, RA (prednisone 10mg), pulmonary HTN, CHRIS, and COPD (3L at home) who was originally admitted on 12/15 for SBO s/p ex lap with lysis of adhesions (4 retention sutures) c/b 12/30 she eviscerated 2/2 a coughing episode s/p ex lap (6 retention sutures) c/b multifocal pneumonia, developed hypercapnic respiratory failure requiring intubation 1/6, s/p bronch 1/9/18, and extubated on 1/24, course further c/b Enterococci bacteremia now readmitted to MICU for hypoxic respiratory failure 2/2 atelectasis requiring intubation 01/31.  Weaned off nitric oxide yesterday successfully, now on sildenafil tolerating well.    Neuro: Sedated and intubated. Continue with prop/fent/precedex. wean propofol as tolerated, lower fentanyl as tolerated.    CV: Shock state currently off levophed, will maintain MAP >65,  hold antihypertensive meds  Pulm: Intubated with hypoxic respiratory failure since 1/31, remains severely deconditioned with hypoxic respiratory failure.  Continue to diurese, now off Nitric oxide saturating well.  Continue with sildenafil. Remains difficulty to wean requiring high PEEP and FiO2.  GI: S/p SBO with ex-lap ccb evisceration; NPO with tube feeds continue with protonix  Renal/Metabolic: Continue with diuresis net neg of 1-2L.    ID: Hold antiobtics for now, completed course of vanc and zosyn for enterococcal bactermia and kleb in sputum.   Endo: NPH 16 units q6h ISS,  cont with levothyroxine  Dispo: Ongoing goals of care discussion.  Discussion of tracheostomy given persistent respiratory failure with failure to wean given hypoxia will be required.  Awaiting for daughter.  Discussed with ana maría Pisano current changes in management at bedside.  Continues to require high PEEP in the setting of hypoxemia.      Critical Care Time Spent 45 minutes .

## 2019-02-18 LAB
ALBUMIN SERPL ELPH-MCNC: 2.6 G/DL — LOW (ref 3.3–5)
ALP SERPL-CCNC: 179 U/L — HIGH (ref 40–120)
ALT FLD-CCNC: 25 U/L — SIGNIFICANT CHANGE UP (ref 4–33)
ANION GAP SERPL CALC-SCNC: 16 MMO/L — HIGH (ref 7–14)
ANISOCYTOSIS BLD QL: SLIGHT — SIGNIFICANT CHANGE UP
APTT BLD: 44.6 SEC — HIGH (ref 27.5–36.3)
AST SERPL-CCNC: 33 U/L — HIGH (ref 4–32)
BASOPHILS # BLD AUTO: 0.15 K/UL — SIGNIFICANT CHANGE UP (ref 0–0.2)
BASOPHILS NFR BLD AUTO: 1.2 % — SIGNIFICANT CHANGE UP (ref 0–2)
BASOPHILS NFR SPEC: 1.1 % — SIGNIFICANT CHANGE UP (ref 0–2)
BILIRUB SERPL-MCNC: 0.3 MG/DL — SIGNIFICANT CHANGE UP (ref 0.2–1.2)
BLASTS # FLD: 0 % — SIGNIFICANT CHANGE UP (ref 0–0)
BLD GP AB SCN SERPL QL: NEGATIVE — SIGNIFICANT CHANGE UP
BUN SERPL-MCNC: 31 MG/DL — HIGH (ref 7–23)
CALCIUM SERPL-MCNC: 9 MG/DL — SIGNIFICANT CHANGE UP (ref 8.4–10.5)
CHLORIDE SERPL-SCNC: 99 MMOL/L — SIGNIFICANT CHANGE UP (ref 98–107)
CO2 SERPL-SCNC: 28 MMOL/L — SIGNIFICANT CHANGE UP (ref 22–31)
CREAT SERPL-MCNC: 0.68 MG/DL — SIGNIFICANT CHANGE UP (ref 0.5–1.3)
DACRYOCYTES BLD QL SMEAR: SLIGHT — SIGNIFICANT CHANGE UP
EOSINOPHIL # BLD AUTO: 0.77 K/UL — HIGH (ref 0–0.5)
EOSINOPHIL NFR BLD AUTO: 6.1 % — HIGH (ref 0–6)
EOSINOPHIL NFR FLD: 5.5 % — SIGNIFICANT CHANGE UP (ref 0–6)
GIANT PLATELETS BLD QL SMEAR: PRESENT — SIGNIFICANT CHANGE UP
GLUCOSE BLDC GLUCOMTR-MCNC: 150 MG/DL — HIGH (ref 70–99)
GLUCOSE BLDC GLUCOMTR-MCNC: 177 MG/DL — HIGH (ref 70–99)
GLUCOSE BLDC GLUCOMTR-MCNC: 209 MG/DL — HIGH (ref 70–99)
GLUCOSE BLDC GLUCOMTR-MCNC: 244 MG/DL — HIGH (ref 70–99)
GLUCOSE SERPL-MCNC: 142 MG/DL — HIGH (ref 70–99)
HCT VFR BLD CALC: 24.5 % — LOW (ref 34.5–45)
HGB BLD-MCNC: 7.1 G/DL — LOW (ref 11.5–15.5)
HYPOCHROMIA BLD QL: SIGNIFICANT CHANGE UP
IMM GRANULOCYTES NFR BLD AUTO: 5.2 % — HIGH (ref 0–1.5)
INR BLD: 1.14 — SIGNIFICANT CHANGE UP (ref 0.88–1.17)
LYMPHOCYTES # BLD AUTO: 17.9 % — SIGNIFICANT CHANGE UP (ref 13–44)
LYMPHOCYTES # BLD AUTO: 2.28 K/UL — SIGNIFICANT CHANGE UP (ref 1–3.3)
LYMPHOCYTES NFR SPEC AUTO: 6.6 % — LOW (ref 13–44)
MACROCYTES BLD QL: SLIGHT — SIGNIFICANT CHANGE UP
MAGNESIUM SERPL-MCNC: 2 MG/DL — SIGNIFICANT CHANGE UP (ref 1.6–2.6)
MCHC RBC-ENTMCNC: 29 % — LOW (ref 32–36)
MCHC RBC-ENTMCNC: 30.5 PG — SIGNIFICANT CHANGE UP (ref 27–34)
MCV RBC AUTO: 105.2 FL — HIGH (ref 80–100)
METAMYELOCYTES # FLD: 3.3 % — HIGH (ref 0–1)
MONOCYTES # BLD AUTO: 1.15 K/UL — HIGH (ref 0–0.9)
MONOCYTES NFR BLD AUTO: 9 % — SIGNIFICANT CHANGE UP (ref 2–14)
MONOCYTES NFR BLD: 7.7 % — SIGNIFICANT CHANGE UP (ref 2–9)
MYELOCYTES NFR BLD: 4.4 % — HIGH (ref 0–0)
NEUTROPHIL AB SER-ACNC: 70.3 % — SIGNIFICANT CHANGE UP (ref 43–77)
NEUTROPHILS # BLD AUTO: 7.71 K/UL — HIGH (ref 1.8–7.4)
NEUTROPHILS NFR BLD AUTO: 60.6 % — SIGNIFICANT CHANGE UP (ref 43–77)
NEUTS BAND # BLD: 0 % — SIGNIFICANT CHANGE UP (ref 0–6)
NRBC # BLD: 4 /100WBC — SIGNIFICANT CHANGE UP
NRBC # FLD: 0.69 K/UL — LOW (ref 25–125)
NRBC FLD-RTO: 5.4 — SIGNIFICANT CHANGE UP
OTHER - HEMATOLOGY %: 0 — SIGNIFICANT CHANGE UP
OVALOCYTES BLD QL SMEAR: SLIGHT — SIGNIFICANT CHANGE UP
PHOSPHATE SERPL-MCNC: 3.3 MG/DL — SIGNIFICANT CHANGE UP (ref 2.5–4.5)
PLATELET # BLD AUTO: 525 K/UL — HIGH (ref 150–400)
PLATELET COUNT - ESTIMATE: SIGNIFICANT CHANGE UP
PMV BLD: 10.3 FL — SIGNIFICANT CHANGE UP (ref 7–13)
POTASSIUM SERPL-MCNC: 3.6 MMOL/L — SIGNIFICANT CHANGE UP (ref 3.5–5.3)
POTASSIUM SERPL-SCNC: 3.6 MMOL/L — SIGNIFICANT CHANGE UP (ref 3.5–5.3)
PROMYELOCYTES # FLD: 0 % — SIGNIFICANT CHANGE UP (ref 0–0)
PROT SERPL-MCNC: 6.3 G/DL — SIGNIFICANT CHANGE UP (ref 6–8.3)
PROTHROM AB SERPL-ACNC: 13 SEC — SIGNIFICANT CHANGE UP (ref 9.8–13.1)
RBC # BLD: 2.33 M/UL — LOW (ref 3.8–5.2)
RBC # FLD: 20.6 % — HIGH (ref 10.3–14.5)
RH IG SCN BLD-IMP: POSITIVE — SIGNIFICANT CHANGE UP
SMUDGE CELLS # BLD: PRESENT — SIGNIFICANT CHANGE UP
SODIUM SERPL-SCNC: 143 MMOL/L — SIGNIFICANT CHANGE UP (ref 135–145)
VARIANT LYMPHS # BLD: 1.1 % — SIGNIFICANT CHANGE UP
WBC # BLD: 12.72 K/UL — HIGH (ref 3.8–10.5)
WBC # FLD AUTO: 12.72 K/UL — HIGH (ref 3.8–10.5)

## 2019-02-18 PROCEDURE — 99291 CRITICAL CARE FIRST HOUR: CPT

## 2019-02-18 RX ORDER — ERTAPENEM SODIUM 1 G/1
1000 INJECTION, POWDER, LYOPHILIZED, FOR SOLUTION INTRAMUSCULAR; INTRAVENOUS EVERY 24 HOURS
Qty: 0 | Refills: 0 | Status: DISCONTINUED | OUTPATIENT
Start: 2019-02-18 | End: 2019-02-27

## 2019-02-18 RX ORDER — FENTANYL CITRATE 50 UG/ML
2 INJECTION INTRAVENOUS
Qty: 2500 | Refills: 0 | Status: DISCONTINUED | OUTPATIENT
Start: 2019-02-19 | End: 2019-02-26

## 2019-02-18 RX ADMIN — HUMAN INSULIN 8 UNIT(S): 100 INJECTION, SUSPENSION SUBCUTANEOUS at 17:12

## 2019-02-18 RX ADMIN — Medication 40 MILLIGRAM(S): at 14:23

## 2019-02-18 RX ADMIN — CHLORHEXIDINE GLUCONATE 15 MILLILITER(S): 213 SOLUTION TOPICAL at 17:01

## 2019-02-18 RX ADMIN — Medication 20 MILLIGRAM(S): at 22:04

## 2019-02-18 RX ADMIN — SODIUM CHLORIDE 4 MILLILITER(S): 9 INJECTION INTRAMUSCULAR; INTRAVENOUS; SUBCUTANEOUS at 22:44

## 2019-02-18 RX ADMIN — HEPARIN SODIUM 7500 UNIT(S): 5000 INJECTION INTRAVENOUS; SUBCUTANEOUS at 05:30

## 2019-02-18 RX ADMIN — Medication 1 APPLICATION(S): at 17:13

## 2019-02-18 RX ADMIN — DEXMEDETOMIDINE HYDROCHLORIDE IN 0.9% SODIUM CHLORIDE 5.61 MICROGRAM(S)/KG/HR: 4 INJECTION INTRAVENOUS at 08:05

## 2019-02-18 RX ADMIN — Medication 20 MILLIGRAM(S): at 17:01

## 2019-02-18 RX ADMIN — SODIUM CHLORIDE 4 MILLILITER(S): 9 INJECTION INTRAMUSCULAR; INTRAVENOUS; SUBCUTANEOUS at 04:05

## 2019-02-18 RX ADMIN — FENTANYL CITRATE 22.44 MICROGRAM(S)/KG/HR: 50 INJECTION INTRAVENOUS at 20:44

## 2019-02-18 RX ADMIN — SODIUM CHLORIDE 4 MILLILITER(S): 9 INJECTION INTRAMUSCULAR; INTRAVENOUS; SUBCUTANEOUS at 15:36

## 2019-02-18 RX ADMIN — PROPOFOL 33.66 MICROGRAM(S)/KG/MIN: 10 INJECTION, EMULSION INTRAVENOUS at 08:04

## 2019-02-18 RX ADMIN — Medication 4: at 17:13

## 2019-02-18 RX ADMIN — ERTAPENEM SODIUM 120 MILLIGRAM(S): 1 INJECTION, POWDER, LYOPHILIZED, FOR SOLUTION INTRAMUSCULAR; INTRAVENOUS at 05:41

## 2019-02-18 RX ADMIN — Medication 1 APPLICATION(S): at 05:48

## 2019-02-18 RX ADMIN — HUMAN INSULIN 8 UNIT(S): 100 INJECTION, SUSPENSION SUBCUTANEOUS at 00:29

## 2019-02-18 RX ADMIN — Medication 100 MICROGRAM(S): at 22:15

## 2019-02-18 RX ADMIN — HEPARIN SODIUM 7500 UNIT(S): 5000 INJECTION INTRAVENOUS; SUBCUTANEOUS at 22:05

## 2019-02-18 RX ADMIN — Medication 4: at 12:00

## 2019-02-18 RX ADMIN — Medication 2: at 00:29

## 2019-02-18 RX ADMIN — DEXMEDETOMIDINE HYDROCHLORIDE IN 0.9% SODIUM CHLORIDE 5.61 MICROGRAM(S)/KG/HR: 4 INJECTION INTRAVENOUS at 17:15

## 2019-02-18 RX ADMIN — HUMAN INSULIN 8 UNIT(S): 100 INJECTION, SUSPENSION SUBCUTANEOUS at 05:31

## 2019-02-18 RX ADMIN — PANTOPRAZOLE SODIUM 40 MILLIGRAM(S): 20 TABLET, DELAYED RELEASE ORAL at 13:09

## 2019-02-18 RX ADMIN — Medication 5 MILLIGRAM(S): at 13:09

## 2019-02-18 RX ADMIN — Medication 40 MILLIGRAM(S): at 22:04

## 2019-02-18 RX ADMIN — Medication 5.26 MICROGRAM(S)/KG/MIN: at 08:05

## 2019-02-18 RX ADMIN — HEPARIN SODIUM 7500 UNIT(S): 5000 INJECTION INTRAVENOUS; SUBCUTANEOUS at 14:22

## 2019-02-18 RX ADMIN — Medication 20 MILLIGRAM(S): at 05:30

## 2019-02-18 RX ADMIN — CHLORHEXIDINE GLUCONATE 15 MILLILITER(S): 213 SOLUTION TOPICAL at 05:29

## 2019-02-18 RX ADMIN — Medication 5 MILLIGRAM(S): at 05:29

## 2019-02-18 RX ADMIN — PROPOFOL 33.66 MICROGRAM(S)/KG/MIN: 10 INJECTION, EMULSION INTRAVENOUS at 17:14

## 2019-02-18 RX ADMIN — Medication 40 MILLIGRAM(S): at 05:30

## 2019-02-18 RX ADMIN — FENTANYL CITRATE 22.44 MICROGRAM(S)/KG/HR: 50 INJECTION INTRAVENOUS at 08:04

## 2019-02-18 RX ADMIN — CHLORHEXIDINE GLUCONATE 1 APPLICATION(S): 213 SOLUTION TOPICAL at 12:00

## 2019-02-18 RX ADMIN — PROPOFOL 33.66 MICROGRAM(S)/KG/MIN: 10 INJECTION, EMULSION INTRAVENOUS at 20:43

## 2019-02-18 RX ADMIN — HUMAN INSULIN 8 UNIT(S): 100 INJECTION, SUSPENSION SUBCUTANEOUS at 12:01

## 2019-02-18 RX ADMIN — FENTANYL CITRATE 22.44 MICROGRAM(S)/KG/HR: 50 INJECTION INTRAVENOUS at 12:00

## 2019-02-18 RX ADMIN — SODIUM CHLORIDE 4 MILLILITER(S): 9 INJECTION INTRAMUSCULAR; INTRAVENOUS; SUBCUTANEOUS at 10:30

## 2019-02-18 RX ADMIN — DEXMEDETOMIDINE HYDROCHLORIDE IN 0.9% SODIUM CHLORIDE 5.61 MICROGRAM(S)/KG/HR: 4 INJECTION INTRAVENOUS at 20:43

## 2019-02-18 RX ADMIN — SIMVASTATIN 20 MILLIGRAM(S): 20 TABLET, FILM COATED ORAL at 22:04

## 2019-02-18 RX ADMIN — Medication 1 APPLICATION(S): at 11:59

## 2019-02-18 NOTE — PROGRESS NOTE ADULT - SUBJECTIVE AND OBJECTIVE BOX
CHIEF COMPLAINT:    Interval Events:  In last 24 hours       REVIEW OF SYSTEMS:  Constitutional:   Eyes:  ENT:  CV:  Resp:  GI:  :  MSK:  Integumentary:  Neurological:  Psychiatric:  Endocrine:  Hematologic/Lymphatic:  Allergic/Immunologic:  [ ] All other systems negative  [ ] Unable to assess ROS because ________    OBJECTIVE:  ICU Vital Signs Last 24 Hrs  T(C): 37.3 (18 Feb 2019 08:00), Max: 37.6 (18 Feb 2019 03:00)  T(F): 99.1 (18 Feb 2019 08:00), Max: 99.6 (18 Feb 2019 03:00)  HR: 86 (18 Feb 2019 09:00) (60 - 98)  BP: 122/75 (18 Feb 2019 09:00) (80/51 - 125/79)  BP(mean): 88 (18 Feb 2019 09:00) (60 - 93)  ABP: --  ABP(mean): --  RR: 20 (18 Feb 2019 09:00) (13 - 31)  SpO2: 94% (18 Feb 2019 09:00) (91% - 99%)    Mode: AC/ CMV (Assist Control/ Continuous Mandatory Ventilation), RR (machine): 28, TV (machine): 350, FiO2: 70, PEEP: 16, MAP: 21, PIP: 33    02-17 @ 07:01 - 02-18 @ 07:00  --------------------------------------------------------  IN: 2417.8 mL / OUT: 2465 mL / NET: -47.2 mL    02-18 @ 07:01  - 02-18 @ 10:37  --------------------------------------------------------  IN: 92.9 mL / OUT: 350 mL / NET: -257.1 mL      CAPILLARY BLOOD GLUCOSE      POCT Blood Glucose.: 150 mg/dL (18 Feb 2019 05:28)      PHYSICAL EXAM:  General:   HEENT:   Lymph Nodes:  Neck:   Respiratory:   Cardiovascular:   Abdomen:   Extremities:   Skin:   Neurological:  Psychiatry:    LINES:    HOSPITAL MEDICATIONS:  MEDICATIONS  (STANDING):  chlorhexidine 0.12% Liquid 15 milliLiter(s) Swish and Spit every 12 hours  chlorhexidine 4% Liquid 1 Application(s) Topical <User Schedule>  collagenase Ointment 1 Application(s) Topical daily  dexmedetomidine Infusion 0.2 MICROgram(s)/kG/Hr (5.61 mL/Hr) IV Continuous <Continuous>  dextrose 5%. 1000 milliLiter(s) (50 mL/Hr) IV Continuous <Continuous>  dextrose 50% Injectable 12.5 Gram(s) IV Push once  dextrose 50% Injectable 25 Gram(s) IV Push once  dextrose 50% Injectable 25 Gram(s) IV Push once  ertapenem  IVPB 1000 milliGRAM(s) IV Intermittent every 24 hours  fentaNYL   Infusion. 2 MICROgram(s)/kG/Hr (22.44 mL/Hr) IV Continuous <Continuous>  furosemide   Injectable 40 milliGRAM(s) IV Push every 8 hours  heparin  Injectable 7500 Unit(s) SubCutaneous every 8 hours  influenza   Vaccine 0.5 milliLiter(s) IntraMuscular once  insulin lispro (HumaLOG) corrective regimen sliding scale   SubCutaneous every 6 hours  insulin NPH human recombinant 8 Unit(s) SubCutaneous every 6 hours  levothyroxine Injectable 100 MICROGram(s) IV Push at bedtime  methylPREDNISolone sodium succinate Injectable 5 milliGRAM(s) IV Push once  norepinephrine Infusion 0.05 MICROgram(s)/kG/Min (5.259 mL/Hr) IV Continuous <Continuous>  pantoprazole  Injectable 40 milliGRAM(s) IV Push daily  petrolatum Ophthalmic Ointment 1 Application(s) Both EYES every 12 hours  propofol Infusion 50 MICROgram(s)/kG/Min (33.66 mL/Hr) IV Continuous <Continuous>  sildenafil (REVATIO) 20 milliGRAM(s) Oral three times a day  simvastatin 20 milliGRAM(s) Oral at bedtime  sodium chloride 3%  Inhalation 4 milliLiter(s) Inhalation every 6 hours    MEDICATIONS  (PRN):  acetaminophen    Suspension .. 650 milliGRAM(s) Oral every 6 hours PRN Temp greater or equal to 38C (100.4F), Mild Pain (1 - 3), Moderate Pain (4 - 6)  dextrose 40% Gel 15 Gram(s) Oral once PRN Blood Glucose LESS THAN 70 milliGRAM(s)/deciliter  glucagon  Injectable 1 milliGRAM(s) IntraMuscular once PRN Glucose LESS THAN 70 milligrams/deciliter  glucagon  Injectable 1 milliGRAM(s) IntraMuscular once PRN Glucose LESS THAN 70 milligrams/deciliter      LABS:                        7.1    12.72 )-----------( 525      ( 18 Feb 2019 02:45 )             24.5     02-18    143  |  99  |  31<H>  ----------------------------<  142<H>  3.6   |  28  |  0.68    Ca    9.0      18 Feb 2019 02:45  Phos  3.3     02-18  Mg     2.0     02-18    TPro  6.3  /  Alb  2.6<L>  /  TBili  0.3  /  DBili  x   /  AST  33<H>  /  ALT  25  /  AlkPhos  179<H>  02-18    PT/INR - ( 18 Feb 2019 02:45 )   PT: 13.0 SEC;   INR: 1.14          PTT - ( 18 Feb 2019 02:45 )  PTT:44.6 SEC          MICROBIOLOGY:     RADIOLOGY:  [ ] Reviewed and interpreted by me    EKG: CHIEF COMPLAINT:    Interval Events:  In last 24 hours urine cultures were positive for ESBL. She was started on ertapenem overnight. She is now on levophed 0.05. Her prednisone was decreased over the weekend and then her insulin was decreased last night.       REVIEW OF SYSTEMS:  [X] Unable to assess ROS because intubated/sedated    OBJECTIVE:  ICU Vital Signs Last 24 Hrs  T(C): 37.3 (18 Feb 2019 08:00), Max: 37.6 (18 Feb 2019 03:00)  T(F): 99.1 (18 Feb 2019 08:00), Max: 99.6 (18 Feb 2019 03:00)  HR: 86 (18 Feb 2019 09:00) (60 - 98)  BP: 122/75 (18 Feb 2019 09:00) (80/51 - 125/79)  BP(mean): 88 (18 Feb 2019 09:00) (60 - 93)  ABP: --  ABP(mean): --  RR: 20 (18 Feb 2019 09:00) (13 - 31)  SpO2: 94% (18 Feb 2019 09:00) (91% - 99%)    Mode: AC/ CMV (Assist Control/ Continuous Mandatory Ventilation), RR (machine): 28, TV (machine): 350, FiO2: 70, PEEP: 16, MAP: 21, PIP: 33    02-17 @ 07:01 - 02-18 @ 07:00  --------------------------------------------------------  IN: 2417.8 mL / OUT: 2465 mL / NET: -47.2 mL    02-18 @ 07:01 - 02-18 @ 10:37  --------------------------------------------------------  IN: 92.9 mL / OUT: 350 mL / NET: -257.1 mL      CAPILLARY BLOOD GLUCOSE      POCT Blood Glucose.: 150 mg/dL (18 Feb 2019 05:28)      PHYSICAL EXAM:  General: NAD  HEENT: Tongue swelling/intubated  Respiratory: Unable to fully assess with isolation stethoscope, appears in no acute distress, intubated/ventilated at 70% FIO2  Cardiovascular: Unable to fully assess, regular and HR 90s on telemetry  Abdomen: Open with clean overlying gauze  Extremities: DP pulses present, feet warm  Skin: L SC TLC  Neurological: sedated  Psychiatry: calm    LINES:    HOSPITAL MEDICATIONS:  MEDICATIONS  (STANDING):  chlorhexidine 0.12% Liquid 15 milliLiter(s) Swish and Spit every 12 hours  chlorhexidine 4% Liquid 1 Application(s) Topical <User Schedule>  collagenase Ointment 1 Application(s) Topical daily  dexmedetomidine Infusion 0.2 MICROgram(s)/kG/Hr (5.61 mL/Hr) IV Continuous <Continuous>  dextrose 5%. 1000 milliLiter(s) (50 mL/Hr) IV Continuous <Continuous>  dextrose 50% Injectable 12.5 Gram(s) IV Push once  dextrose 50% Injectable 25 Gram(s) IV Push once  dextrose 50% Injectable 25 Gram(s) IV Push once  ertapenem  IVPB 1000 milliGRAM(s) IV Intermittent every 24 hours  fentaNYL   Infusion. 2 MICROgram(s)/kG/Hr (22.44 mL/Hr) IV Continuous <Continuous>  furosemide   Injectable 40 milliGRAM(s) IV Push every 8 hours  heparin  Injectable 7500 Unit(s) SubCutaneous every 8 hours  influenza   Vaccine 0.5 milliLiter(s) IntraMuscular once  insulin lispro (HumaLOG) corrective regimen sliding scale   SubCutaneous every 6 hours  insulin NPH human recombinant 8 Unit(s) SubCutaneous every 6 hours  levothyroxine Injectable 100 MICROGram(s) IV Push at bedtime  methylPREDNISolone sodium succinate Injectable 5 milliGRAM(s) IV Push once  norepinephrine Infusion 0.05 MICROgram(s)/kG/Min (5.259 mL/Hr) IV Continuous <Continuous>  pantoprazole  Injectable 40 milliGRAM(s) IV Push daily  petrolatum Ophthalmic Ointment 1 Application(s) Both EYES every 12 hours  propofol Infusion 50 MICROgram(s)/kG/Min (33.66 mL/Hr) IV Continuous <Continuous>  sildenafil (REVATIO) 20 milliGRAM(s) Oral three times a day  simvastatin 20 milliGRAM(s) Oral at bedtime  sodium chloride 3%  Inhalation 4 milliLiter(s) Inhalation every 6 hours    MEDICATIONS  (PRN):  acetaminophen    Suspension .. 650 milliGRAM(s) Oral every 6 hours PRN Temp greater or equal to 38C (100.4F), Mild Pain (1 - 3), Moderate Pain (4 - 6)  dextrose 40% Gel 15 Gram(s) Oral once PRN Blood Glucose LESS THAN 70 milliGRAM(s)/deciliter  glucagon  Injectable 1 milliGRAM(s) IntraMuscular once PRN Glucose LESS THAN 70 milligrams/deciliter  glucagon  Injectable 1 milliGRAM(s) IntraMuscular once PRN Glucose LESS THAN 70 milligrams/deciliter      LABS:                        7.1    12.72 )-----------( 525      ( 18 Feb 2019 02:45 )             24.5     02-18    143  |  99  |  31<H>  ----------------------------<  142<H>  3.6   |  28  |  0.68    Ca    9.0      18 Feb 2019 02:45  Phos  3.3     02-18  Mg     2.0     02-18    TPro  6.3  /  Alb  2.6<L>  /  TBili  0.3  /  DBili  x   /  AST  33<H>  /  ALT  25  /  AlkPhos  179<H>  02-18    PT/INR - ( 18 Feb 2019 02:45 )   PT: 13.0 SEC;   INR: 1.14          PTT - ( 18 Feb 2019 02:45 )  PTT:44.6 SEC          MICROBIOLOGY:     RADIOLOGY:  [ ] Reviewed and interpreted by me    EKG:

## 2019-02-18 NOTE — PROGRESS NOTE ADULT - ATTENDING COMMENTS
63 F with severe COPD on home O2, pHTN, CHRIS, RA prolonged hospital/ICU course including SBO with failure to wean.    FiO2 weaned to 60% today during rounds  continue levophed  chronic steroids  monitor for infection - started on meropenem overnight without overt source    Needs a tracheostomy if to achieve ventilator liberation.

## 2019-02-18 NOTE — PROGRESS NOTE ADULT - ASSESSMENT
*IN PROGRESS*    63 year old female with PMHx of HTN, DM, hypothyroidism, RA (prednisone 10mg), pulmonary HTN, CHRIS, and COPD (3L at home) who was originally admitted on 12/15 for SBO s/p ex lap with lysis of adhesions (4 retention sutures) c/b 12/30 she eviscerated 2/2 a coughing episode s/p ex lap (6 retention sutures) c/b multifocal pneumonia, developed hypercapnic respiratory failure requiring intubation 1/6, s/p bronch 1/9/18, and extubated on 1/24, course further c/b Enterococci bacteremia now readmitted to MICU for hypoxic respiratory failure 2/2 atelectasis requiring intubation 01/31    #Neuro  - Sedated   - Continue propofol and fentanyl, precedex.    #Respiratory   1) Hypoxic Respiratory Failure 2/2 atelectasis requiring intubation 1/31   -Bronchoscopy showing findings concerning for hyperdynamic airway collapse.   - Patient was deconditioned and unable to sit up and participate with PT leading to a functional shunt   - At this time, will continue ventilatory support   - will diurese w/ lasix 40 IV BID ; will keep net negative 1-2L per day ; will check lytes q12 on BMP to replete as needed especially K   - c/w Sildenafil 20 TID - BPs tolerating; Off NO; on 100% FiO2    #Cardiovascular  1) Cardiogenic shock 2/2 iatrogenic   - Prior bedside US showing LVOT; currently not concerned for LVOT on most recent POCUS  - off Levophed; pressures stable  - Holding amlodipine 10mg, and metoprolol 25mg BID    - c/w Simvastatin 20mg     #GI  - SBO s/p ex-lap with findings of healthy bowel c/b evisceration of bowel requiring placement of retention sutures  - NPO with tube feeds   - Continue Protonix    #Renal/Metabolic  - Monitor for electrolyte derangements in the setting of diuresis ; replete as needed  - trend BMP for BUN/Cr    #ID   - Off antibiotics as per discussion w/ ID - had 27 days of Vanc, 3 days of Ceftriaxone, and 8 days of Zosyn for enterococcal bacteremia and Klebsiella sputum culture   - C diff negative  - Central line DC'D     #Endocrine  1) Diabetes Mellitus Type 2  - NPH 24 q6; off insulin gtt  - Gabapentin 400 TID held for now     2) Hypothyroidism   - cw 100 mcg levothyroxine IV     # Rheumatology  1) Rheumatoid arthritis   - Titrate off steroids - IV Dex 5 for 7 days; day 2     #DVT ppx  - Heparin SQ     # Ethics  GOC discussion ongoing with Palliative Care team. Son (HCP) would like to give the patient time on the ventilatory before making any decisions regarding tracheostomy ; per palliative, daughter will be coming in this week to assist with decision making process 63 year old female with PMHx of HTN, DM, hypothyroidism, RA (prednisone 10mg), pulmonary HTN, CHRIS, and COPD (3L at home) who was originally admitted on 12/15 for SBO s/p ex lap with lysis of adhesions (4 retention sutures) c/b 12/30 she eviscerated 2/2 a coughing episode s/p ex lap (6 retention sutures) c/b multifocal pneumonia, developed hypercapnic respiratory failure requiring intubation 1/6, s/p bronch 1/9/18, and extubated on 1/24, course further c/b Enterococci bacteremia now readmitted to MICU for hypoxic respiratory failure 2/2 atelectasis requiring intubation 01/31. Overnight had grown ESBL in urine, now on ertapenem and levophed.     #Neuro:   - Intubated/sedated   - Continue propofol and fentanyl, precedex.  - Unclear underlying mental status    #Respiratory   1) Hypoxic Respiratory Failure 2/2 atelectasis requiring intubation 1/31   - Bronchoscopy showing findings concerning for hyperdynamic airway collapse.   - Patient was deconditioned and unable to sit up and participate with PT leading to a functional shunt   - At this time, will continue ventilatory support   - Given pressor support will diurese for goal of net even or slightly negative.   - c/w Sildenafil 20 TID - BPs tolerating; Off NO; on 100% FiO2  - Reinstating solumedrol 10 mg daily    #Cardiovascular  1) Cardiogenic shock 2/2 iatrogenic   - Prior bedside US showing LVOT; currently not concerned for LVOT on most recent POCUS  - Norepinephrine restarted at 0.05  - Continue to hold amlodipine 10mg, and metoprolol 25mg BID    - c/w Simvastatin 20mg     #GI  - SBO s/p ex-lap with findings of healthy bowel c/b evisceration of bowel requiring placement of retention sutures  - NPO with tube feeds   - Continue Protonix    #Renal/Metabolic  - Monitor for electrolyte derangements in the setting of diuresis ; replete as needed  - trend BMP for BUN/Cr    #ID   - Off antibiotics as per discussion w/ ID - had 27 days of Vanc, 3 days of Ceftriaxone, and 8 days of Zosyn for enterococcal bacteremia and Klebsiella sputum culture   - Urine cx with ESBL 10-50k colonies and candida, potentially colonized, but given levophed requirement will continue and reassess in 24 hours.   - C diff negative  - Central line DC'D     #Endocrine  1) Diabetes Mellitus Type 2  - NPH 8 q6; off insulin gtt, decreased from NPH 24 Q6  - Gabapentin 400 TID held for now     2) Hypothyroidism   - c/w 100 mcg levothyroxine IV     # Rheumatology  1) Rheumatoid arthritis   - Titrate off steroids - IV Dex 5 for 7 days; day 2     #DVT ppx  - Heparin SQ     # Ethics:   GOC discussion ongoing with Palliative Care team. Son (HCP) would like to give the patient time on the ventilatory before making any decisions regarding tracheostomy ; per palliative, daughter will be coming in this week to assist with decision making process     Cathryn Ackerman MD  Internal Medicine, PGY-3  Pager [phone]

## 2019-02-19 LAB
ALBUMIN SERPL ELPH-MCNC: 2.8 G/DL — LOW (ref 3.3–5)
ALP SERPL-CCNC: 163 U/L — HIGH (ref 40–120)
ALT FLD-CCNC: 22 U/L — SIGNIFICANT CHANGE UP (ref 4–33)
ANION GAP SERPL CALC-SCNC: 15 MMO/L — HIGH (ref 7–14)
ANION GAP SERPL CALC-SCNC: 17 MMO/L — HIGH (ref 7–14)
APTT BLD: 47.3 SEC — HIGH (ref 27.5–36.3)
AST SERPL-CCNC: 22 U/L — SIGNIFICANT CHANGE UP (ref 4–32)
BACTERIA BLD CULT: SIGNIFICANT CHANGE UP
BACTERIA BLD CULT: SIGNIFICANT CHANGE UP
BASOPHILS # BLD AUTO: 0.13 K/UL — SIGNIFICANT CHANGE UP (ref 0–0.2)
BASOPHILS NFR BLD AUTO: 1.3 % — SIGNIFICANT CHANGE UP (ref 0–2)
BILIRUB SERPL-MCNC: 0.2 MG/DL — SIGNIFICANT CHANGE UP (ref 0.2–1.2)
BUN SERPL-MCNC: 32 MG/DL — HIGH (ref 7–23)
BUN SERPL-MCNC: 39 MG/DL — HIGH (ref 7–23)
CALCIUM SERPL-MCNC: 9 MG/DL — SIGNIFICANT CHANGE UP (ref 8.4–10.5)
CALCIUM SERPL-MCNC: 9 MG/DL — SIGNIFICANT CHANGE UP (ref 8.4–10.5)
CHLORIDE SERPL-SCNC: 101 MMOL/L — SIGNIFICANT CHANGE UP (ref 98–107)
CHLORIDE SERPL-SCNC: 101 MMOL/L — SIGNIFICANT CHANGE UP (ref 98–107)
CO2 SERPL-SCNC: 29 MMOL/L — SIGNIFICANT CHANGE UP (ref 22–31)
CO2 SERPL-SCNC: 29 MMOL/L — SIGNIFICANT CHANGE UP (ref 22–31)
CREAT SERPL-MCNC: 0.72 MG/DL — SIGNIFICANT CHANGE UP (ref 0.5–1.3)
CREAT SERPL-MCNC: 0.77 MG/DL — SIGNIFICANT CHANGE UP (ref 0.5–1.3)
EOSINOPHIL # BLD AUTO: 0.34 K/UL — SIGNIFICANT CHANGE UP (ref 0–0.5)
EOSINOPHIL NFR BLD AUTO: 3.3 % — SIGNIFICANT CHANGE UP (ref 0–6)
GLUCOSE BLDC GLUCOMTR-MCNC: 168 MG/DL — HIGH (ref 70–99)
GLUCOSE BLDC GLUCOMTR-MCNC: 194 MG/DL — HIGH (ref 70–99)
GLUCOSE BLDC GLUCOMTR-MCNC: 204 MG/DL — HIGH (ref 70–99)
GLUCOSE BLDC GLUCOMTR-MCNC: 259 MG/DL — HIGH (ref 70–99)
GLUCOSE BLDC GLUCOMTR-MCNC: 335 MG/DL — HIGH (ref 70–99)
GLUCOSE SERPL-MCNC: 187 MG/DL — HIGH (ref 70–99)
GLUCOSE SERPL-MCNC: 219 MG/DL — HIGH (ref 70–99)
HCT VFR BLD CALC: 23.7 % — LOW (ref 34.5–45)
HCT VFR BLD CALC: 26.5 % — LOW (ref 34.5–45)
HGB BLD-MCNC: 6.8 G/DL — CRITICAL LOW (ref 11.5–15.5)
HGB BLD-MCNC: 8.1 G/DL — LOW (ref 11.5–15.5)
IMM GRANULOCYTES NFR BLD AUTO: 4.4 % — HIGH (ref 0–1.5)
INR BLD: 1.18 — HIGH (ref 0.88–1.17)
LYMPHOCYTES # BLD AUTO: 2.07 K/UL — SIGNIFICANT CHANGE UP (ref 1–3.3)
LYMPHOCYTES # BLD AUTO: 20.3 % — SIGNIFICANT CHANGE UP (ref 13–44)
MAGNESIUM SERPL-MCNC: 2.1 MG/DL — SIGNIFICANT CHANGE UP (ref 1.6–2.6)
MAGNESIUM SERPL-MCNC: 2.2 MG/DL — SIGNIFICANT CHANGE UP (ref 1.6–2.6)
MANUAL SMEAR VERIFICATION: SIGNIFICANT CHANGE UP
MCHC RBC-ENTMCNC: 28.7 % — LOW (ref 32–36)
MCHC RBC-ENTMCNC: 29.5 PG — SIGNIFICANT CHANGE UP (ref 27–34)
MCHC RBC-ENTMCNC: 30.2 PG — SIGNIFICANT CHANGE UP (ref 27–34)
MCHC RBC-ENTMCNC: 30.6 % — LOW (ref 32–36)
MCV RBC AUTO: 105.3 FL — HIGH (ref 80–100)
MCV RBC AUTO: 96.4 FL — SIGNIFICANT CHANGE UP (ref 80–100)
MONOCYTES # BLD AUTO: 1.12 K/UL — HIGH (ref 0–0.9)
MONOCYTES NFR BLD AUTO: 11 % — SIGNIFICANT CHANGE UP (ref 2–14)
NEUTROPHILS # BLD AUTO: 6.11 K/UL — SIGNIFICANT CHANGE UP (ref 1.8–7.4)
NEUTROPHILS NFR BLD AUTO: 59.7 % — SIGNIFICANT CHANGE UP (ref 43–77)
NRBC # FLD: 0.49 K/UL — LOW (ref 25–125)
NRBC # FLD: 0.63 K/UL — LOW (ref 25–125)
NRBC FLD-RTO: 4.8 — SIGNIFICANT CHANGE UP
NRBC FLD-RTO: 6 — SIGNIFICANT CHANGE UP
PHOSPHATE SERPL-MCNC: 3.4 MG/DL — SIGNIFICANT CHANGE UP (ref 2.5–4.5)
PHOSPHATE SERPL-MCNC: 4.1 MG/DL — SIGNIFICANT CHANGE UP (ref 2.5–4.5)
PLATELET # BLD AUTO: 440 K/UL — HIGH (ref 150–400)
PLATELET # BLD AUTO: 512 K/UL — HIGH (ref 150–400)
PMV BLD: 10.3 FL — SIGNIFICANT CHANGE UP (ref 7–13)
PMV BLD: 9.7 FL — SIGNIFICANT CHANGE UP (ref 7–13)
POTASSIUM SERPL-MCNC: 3.8 MMOL/L — SIGNIFICANT CHANGE UP (ref 3.5–5.3)
POTASSIUM SERPL-MCNC: 4.6 MMOL/L — SIGNIFICANT CHANGE UP (ref 3.5–5.3)
POTASSIUM SERPL-SCNC: 3.8 MMOL/L — SIGNIFICANT CHANGE UP (ref 3.5–5.3)
POTASSIUM SERPL-SCNC: 4.6 MMOL/L — SIGNIFICANT CHANGE UP (ref 3.5–5.3)
PROT SERPL-MCNC: 6.2 G/DL — SIGNIFICANT CHANGE UP (ref 6–8.3)
PROTHROM AB SERPL-ACNC: 13.2 SEC — HIGH (ref 9.8–13.1)
RBC # BLD: 2.25 M/UL — LOW (ref 3.8–5.2)
RBC # BLD: 2.75 M/UL — LOW (ref 3.8–5.2)
RBC # FLD: 20.5 % — HIGH (ref 10.3–14.5)
RBC # FLD: 23.3 % — HIGH (ref 10.3–14.5)
SODIUM SERPL-SCNC: 145 MMOL/L — SIGNIFICANT CHANGE UP (ref 135–145)
SODIUM SERPL-SCNC: 147 MMOL/L — HIGH (ref 135–145)
WBC # BLD: 10.22 K/UL — SIGNIFICANT CHANGE UP (ref 3.8–10.5)
WBC # BLD: 10.5 K/UL — SIGNIFICANT CHANGE UP (ref 3.8–10.5)
WBC # FLD AUTO: 10.22 K/UL — SIGNIFICANT CHANGE UP (ref 3.8–10.5)
WBC # FLD AUTO: 10.5 K/UL — SIGNIFICANT CHANGE UP (ref 3.8–10.5)

## 2019-02-19 PROCEDURE — 99291 CRITICAL CARE FIRST HOUR: CPT | Mod: 25

## 2019-02-19 PROCEDURE — 93308 TTE F-UP OR LMTD: CPT | Mod: 26

## 2019-02-19 RX ORDER — HUMAN INSULIN 100 [IU]/ML
14 INJECTION, SUSPENSION SUBCUTANEOUS EVERY 6 HOURS
Qty: 0 | Refills: 0 | Status: DISCONTINUED | OUTPATIENT
Start: 2019-02-19 | End: 2019-02-20

## 2019-02-19 RX ADMIN — Medication 20 MILLIGRAM(S): at 05:30

## 2019-02-19 RX ADMIN — Medication 1 APPLICATION(S): at 12:52

## 2019-02-19 RX ADMIN — DEXMEDETOMIDINE HYDROCHLORIDE IN 0.9% SODIUM CHLORIDE 5.61 MICROGRAM(S)/KG/HR: 4 INJECTION INTRAVENOUS at 22:30

## 2019-02-19 RX ADMIN — FENTANYL CITRATE 22.44 MICROGRAM(S)/KG/HR: 50 INJECTION INTRAVENOUS at 22:30

## 2019-02-19 RX ADMIN — PROPOFOL 33.66 MICROGRAM(S)/KG/MIN: 10 INJECTION, EMULSION INTRAVENOUS at 10:27

## 2019-02-19 RX ADMIN — Medication 6: at 17:51

## 2019-02-19 RX ADMIN — CHLORHEXIDINE GLUCONATE 15 MILLILITER(S): 213 SOLUTION TOPICAL at 05:21

## 2019-02-19 RX ADMIN — Medication 1 APPLICATION(S): at 17:50

## 2019-02-19 RX ADMIN — HUMAN INSULIN 14 UNIT(S): 100 INJECTION, SUSPENSION SUBCUTANEOUS at 17:51

## 2019-02-19 RX ADMIN — DEXMEDETOMIDINE HYDROCHLORIDE IN 0.9% SODIUM CHLORIDE 5.61 MICROGRAM(S)/KG/HR: 4 INJECTION INTRAVENOUS at 05:18

## 2019-02-19 RX ADMIN — SODIUM CHLORIDE 4 MILLILITER(S): 9 INJECTION INTRAMUSCULAR; INTRAVENOUS; SUBCUTANEOUS at 10:30

## 2019-02-19 RX ADMIN — Medication 100 MICROGRAM(S): at 23:29

## 2019-02-19 RX ADMIN — DEXMEDETOMIDINE HYDROCHLORIDE IN 0.9% SODIUM CHLORIDE 5.61 MICROGRAM(S)/KG/HR: 4 INJECTION INTRAVENOUS at 10:27

## 2019-02-19 RX ADMIN — PROPOFOL 33.66 MICROGRAM(S)/KG/MIN: 10 INJECTION, EMULSION INTRAVENOUS at 05:19

## 2019-02-19 RX ADMIN — SODIUM CHLORIDE 4 MILLILITER(S): 9 INJECTION INTRAMUSCULAR; INTRAVENOUS; SUBCUTANEOUS at 21:24

## 2019-02-19 RX ADMIN — CHLORHEXIDINE GLUCONATE 1 APPLICATION(S): 213 SOLUTION TOPICAL at 10:27

## 2019-02-19 RX ADMIN — Medication 4: at 00:13

## 2019-02-19 RX ADMIN — HUMAN INSULIN 8 UNIT(S): 100 INJECTION, SUSPENSION SUBCUTANEOUS at 12:51

## 2019-02-19 RX ADMIN — Medication 20 MILLIGRAM(S): at 22:31

## 2019-02-19 RX ADMIN — Medication 1 APPLICATION(S): at 05:29

## 2019-02-19 RX ADMIN — HEPARIN SODIUM 7500 UNIT(S): 5000 INJECTION INTRAVENOUS; SUBCUTANEOUS at 05:22

## 2019-02-19 RX ADMIN — SODIUM CHLORIDE 4 MILLILITER(S): 9 INJECTION INTRAMUSCULAR; INTRAVENOUS; SUBCUTANEOUS at 04:35

## 2019-02-19 RX ADMIN — FENTANYL CITRATE 22.44 MICROGRAM(S)/KG/HR: 50 INJECTION INTRAVENOUS at 05:20

## 2019-02-19 RX ADMIN — ERTAPENEM SODIUM 120 MILLIGRAM(S): 1 INJECTION, POWDER, LYOPHILIZED, FOR SOLUTION INTRAMUSCULAR; INTRAVENOUS at 05:18

## 2019-02-19 RX ADMIN — Medication 40 MILLIGRAM(S): at 13:36

## 2019-02-19 RX ADMIN — SODIUM CHLORIDE 4 MILLILITER(S): 9 INJECTION INTRAMUSCULAR; INTRAVENOUS; SUBCUTANEOUS at 16:32

## 2019-02-19 RX ADMIN — HUMAN INSULIN 8 UNIT(S): 100 INJECTION, SUSPENSION SUBCUTANEOUS at 05:28

## 2019-02-19 RX ADMIN — Medication 40 MILLIGRAM(S): at 22:31

## 2019-02-19 RX ADMIN — Medication 8: at 12:51

## 2019-02-19 RX ADMIN — FENTANYL CITRATE 22.44 MICROGRAM(S)/KG/HR: 50 INJECTION INTRAVENOUS at 00:01

## 2019-02-19 RX ADMIN — PANTOPRAZOLE SODIUM 40 MILLIGRAM(S): 20 TABLET, DELAYED RELEASE ORAL at 12:50

## 2019-02-19 RX ADMIN — SIMVASTATIN 20 MILLIGRAM(S): 20 TABLET, FILM COATED ORAL at 22:31

## 2019-02-19 RX ADMIN — HUMAN INSULIN 14 UNIT(S): 100 INJECTION, SUSPENSION SUBCUTANEOUS at 23:34

## 2019-02-19 RX ADMIN — Medication 20 MILLIGRAM(S): at 13:50

## 2019-02-19 RX ADMIN — Medication 40 MILLIGRAM(S): at 05:21

## 2019-02-19 RX ADMIN — CHLORHEXIDINE GLUCONATE 15 MILLILITER(S): 213 SOLUTION TOPICAL at 17:52

## 2019-02-19 RX ADMIN — PROPOFOL 33.66 MICROGRAM(S)/KG/MIN: 10 INJECTION, EMULSION INTRAVENOUS at 22:30

## 2019-02-19 RX ADMIN — Medication 10 MILLIGRAM(S): at 05:22

## 2019-02-19 RX ADMIN — HUMAN INSULIN 8 UNIT(S): 100 INJECTION, SUSPENSION SUBCUTANEOUS at 00:16

## 2019-02-19 RX ADMIN — Medication 2: at 23:34

## 2019-02-19 RX ADMIN — FENTANYL CITRATE 22.44 MICROGRAM(S)/KG/HR: 50 INJECTION INTRAVENOUS at 13:37

## 2019-02-19 RX ADMIN — HEPARIN SODIUM 7500 UNIT(S): 5000 INJECTION INTRAVENOUS; SUBCUTANEOUS at 22:31

## 2019-02-19 RX ADMIN — Medication 2: at 05:28

## 2019-02-19 RX ADMIN — HEPARIN SODIUM 7500 UNIT(S): 5000 INJECTION INTRAVENOUS; SUBCUTANEOUS at 13:36

## 2019-02-19 NOTE — CONSULT NOTE ADULT - ASSESSMENT
1520- left message for Norris Rush Health Care Proxy to call back.         Full consult to follow Bioethics analysis:    In this case, the conflict is between Beneficence/Non-Malificence and the patient’s Autonomy.   This has been an extremely esdras and difficult hospital course for this patient. Autonomy recognizes that not all individuals have the same preferences and goals and thus that different individuals will make different decisions regarding their health   Autonomy centers on letting patients/their surrogates decide what is best for their health/their family members’ health after being fully informed of the options available as well as the risks and benefits of those options. Essential to the notion of autonomy is that of capacity, making sure the patient is fit to protect his/her autonomy. In this situation, the patient currently lacks capacity as a result of being medically sedated while intubated and on mechanical ventilation.  While lacking capacity she still maintains the right to self-determination, which can be best protected by an appropriate surrogate. Given that the patient had assigned her son Norris Rush as health care proxy, and Dhruv Gutierrez during this admission, it is clear that when the patient had capacity, she felt that Norris would be the most appropriate person to represent her substituted judgement should she ever lose capacity.       In the last 24 hours, the patient, while being medically weaned off of the sedatives, has woken up and blinked her eyes, it is not determined whether or not she is able to communicate her desires at this time.       CONCLUSION    While this decision is emotionally challenging to the patient’s son, medically the patient should be afforded optimization of her capacity in order to decide her treatment course, with full informed consent of the risks and benefits of a tracheostomy.     Ethics will continue conversation with the patient’s son and family regarding decision making and goals of care.     Sneha Altamirano  [phone]      Thank you for involving ethics in this case.       Case discussed with Paulina Krishnamurthy, HENRIK, FNP Director of Clinical Ethics Consultation    Case report written by Sneha Altamirano M.Div., BSN  [facility]    More than 50% of the time of this consultation was spent in coordination of Care of Patient Bioethics analysis:    In this case, the conflict is between Beneficence/Non-Malificence and the patient’s Autonomy.   This has been an extremely prolonged hospital course for this patient. Autonomy recognizes that not all individuals have the same preferences and goals and thus that different individuals will make different decisions regarding their health   Autonomy centers on letting patients/their surrogates decide what is best for their health/their family members’ health after being fully informed of the options available as well as the risks and benefits of those options. Essential to the notion of autonomy is that of capacity, making sure the patient is fit to protect his/her autonomy. In this situation, the patient currently lacks capacity as a result of being medically sedated while intubated and on mechanical ventilation.  While lacking capacity she still maintains the right to self-determination, which can be best protected by an appropriate surrogate. Given that the patient had assigned her son oNrris Rush as health care proxy, and Dhruv Gutierrez during this admission, it is clear that when the patient had capacity, she felt that Norris would be the most appropriate person to represent her substituted judgement should she ever lose capacity.       In the last 24 hours, the patient, while being medically weaned off of the sedatives, has woken up and blinked her eyes, it is not determined whether or not she is able to communicate her desires at this time.       CONCLUSION    While this decision is emotionally challenging to the patient’s son, medically the patient should be afforded optimization of her capacity in order to decide her treatment course, with full informed consent of the risks and benefits of a tracheostomy.     Ethics will continue conversation with the patient’s son and family regarding decision making and goals of care.     Sneha Altamirano  [phone]      Thank you for involving ethics in this case.       Case discussed with Paulina Krishnamurthy, HENRIK, FNP Director of Clinical Ethics Consultation    Case report written by Sneha Altamirano M.Div., BSN  [facility]    More than 50% of the time of this consultation was spent in coordination of Care of Patient

## 2019-02-19 NOTE — CONSULT NOTE ADULT - CONSULT REASON
To assist in an ethical dilemma posed by a 63 year old female whose health care proxy is struggling with decision making regarding prognostication and placement of tracheostomy. CONSULT PURPOSE: Assist in an ethical dilemma posed by a 63year old female with advanced lung disease and is intubated whose appointed health care proxy is struggling with goals of care which includes consent for tracheostomy CONSULT PURPOSE: Assist in an ethical dilemma posed by a 63year old female with advanced lung disease whose appointed health care proxy is struggling with goals of care which includes consent for tracheostomy

## 2019-02-19 NOTE — CHART NOTE - NSCHARTNOTEFT_GEN_A_CORE
:    Chadd Alvarado MD PGY3  [facility] Pager #: [phone]  [facility] Pager #: [phone]    INDICATION:    respiratory failure, hypoxia     PROCEDURE:  [x ] LIMITED ECHO  [x] LIMITED CHEST  [ ] LIMITED RETROPERITONEAL  [ ] LIMITED ABDOMINAL  [ ] LIMITED DVT  [ ] NEEDLE GUIDANCE VASCULAR  [ ] NEEDLE GUIDANCE THORACENTESIS  [ ] NEEDLE GUIDANCE PARACENTESIS  [ ] NEEDLE GUIDANCE PERICARDIOCENTESIS  [ ] OTHER    FINDINGS:    Cardiac: unable to assess due to poor cardiac windows and body habitus     Lungs: Scattered B-Lines. No consolidations at the bases.       INTERPRETATION:    Resolving atelectasis :    Chadd Alvarado MD PGY3  [facility] Pager #: [phone]  [facility] Pager #: [phone]    INDICATION:    respiratory failure, hypoxia     PROCEDURE:  [x ] LIMITED ECHO  [x] LIMITED CHEST  [ ] LIMITED RETROPERITONEAL  [ ] LIMITED ABDOMINAL  [ ] LIMITED DVT  [ ] NEEDLE GUIDANCE VASCULAR  [ ] NEEDLE GUIDANCE THORACENTESIS  [ ] NEEDLE GUIDANCE PARACENTESIS  [ ] NEEDLE GUIDANCE PERICARDIOCENTESIS  [ ] OTHER    FINDINGS:    Cardiac: Normal LV function, no RV dilatation, No pericardial effusion.     Lungs: Scattered B-Lines. No consolidations at the bases.       INTERPRETATION:    Normal cardiac function

## 2019-02-19 NOTE — PROGRESS NOTE ADULT - SUBJECTIVE AND OBJECTIVE BOX
CHIEF COMPLAINT:    Interval Events:    REVIEW OF SYSTEMS:  [X] Unable to assess ROS because Intubated/sedated    OBJECTIVE:  ICU Vital Signs Last 24 Hrs  T(C): 38.2 (19 Feb 2019 04:00), Max: 38.2 (19 Feb 2019 04:00)  T(F): 100.7 (19 Feb 2019 04:00), Max: 100.7 (19 Feb 2019 04:00)  HR: 81 (19 Feb 2019 06:00) (71 - 119)  BP: 75/54 (19 Feb 2019 06:00) (75/54 - 133/89)  BP(mean): 62 (19 Feb 2019 06:00) (61 - 102)  ABP: --  ABP(mean): --  RR: 25 (19 Feb 2019 06:00) (14 - 28)  SpO2: 98% (19 Feb 2019 06:00) (88% - 98%)    Mode: AC/ CMV (Assist Control/ Continuous Mandatory Ventilation), RR (machine): 28, TV (machine): 350, FiO2: 60, PEEP: 16, MAP: 22, PIP: 36    02-17 @ 07:01 - 02-18 @ 07:00  --------------------------------------------------------  IN: 2417.8 mL / OUT: 2465 mL / NET: -47.2 mL    02-18 @ 07:01 - 02-19 @ 06:57  --------------------------------------------------------  IN: 2218.7 mL / OUT: 3025 mL / NET: -806.3 mL      CAPILLARY BLOOD GLUCOSE      POCT Blood Glucose.: 194 mg/dL (19 Feb 2019 05:18)      PHYSICAL EXAM:  General:   HEENT:   Lymph Nodes:  Neck:   Respiratory:   Cardiovascular:   Abdomen:   Extremities:   Skin:   Neurological:  Psychiatry:    LINES:    HOSPITAL MEDICATIONS:  MEDICATIONS  (STANDING):  chlorhexidine 0.12% Liquid 15 milliLiter(s) Swish and Spit every 12 hours  chlorhexidine 4% Liquid 1 Application(s) Topical <User Schedule>  collagenase Ointment 1 Application(s) Topical daily  dexmedetomidine Infusion 0.2 MICROgram(s)/kG/Hr (5.61 mL/Hr) IV Continuous <Continuous>  dextrose 5%. 1000 milliLiter(s) (50 mL/Hr) IV Continuous <Continuous>  dextrose 50% Injectable 12.5 Gram(s) IV Push once  dextrose 50% Injectable 25 Gram(s) IV Push once  dextrose 50% Injectable 25 Gram(s) IV Push once  ertapenem  IVPB 1000 milliGRAM(s) IV Intermittent every 24 hours  fentaNYL   Infusion. 2 MICROgram(s)/kG/Hr (22.44 mL/Hr) IV Continuous <Continuous>  furosemide   Injectable 40 milliGRAM(s) IV Push every 8 hours  heparin  Injectable 7500 Unit(s) SubCutaneous every 8 hours  influenza   Vaccine 0.5 milliLiter(s) IntraMuscular once  insulin lispro (HumaLOG) corrective regimen sliding scale   SubCutaneous every 6 hours  insulin NPH human recombinant 8 Unit(s) SubCutaneous every 6 hours  levothyroxine Injectable 100 MICROGram(s) IV Push at bedtime  methylPREDNISolone sodium succinate Injectable 10 milliGRAM(s) IV Push daily  pantoprazole  Injectable 40 milliGRAM(s) IV Push daily  petrolatum Ophthalmic Ointment 1 Application(s) Both EYES every 12 hours  propofol Infusion 50 MICROgram(s)/kG/Min (33.66 mL/Hr) IV Continuous <Continuous>  sildenafil (REVATIO) 20 milliGRAM(s) Oral three times a day  simvastatin 20 milliGRAM(s) Oral at bedtime  sodium chloride 3%  Inhalation 4 milliLiter(s) Inhalation every 6 hours    MEDICATIONS  (PRN):  acetaminophen    Suspension .. 650 milliGRAM(s) Oral every 6 hours PRN Temp greater or equal to 38C (100.4F), Mild Pain (1 - 3), Moderate Pain (4 - 6)  dextrose 40% Gel 15 Gram(s) Oral once PRN Blood Glucose LESS THAN 70 milliGRAM(s)/deciliter  glucagon  Injectable 1 milliGRAM(s) IntraMuscular once PRN Glucose LESS THAN 70 milligrams/deciliter  glucagon  Injectable 1 milliGRAM(s) IntraMuscular once PRN Glucose LESS THAN 70 milligrams/deciliter      LABS:                        6.8    10.22 )-----------( 512      ( 19 Feb 2019 03:50 )             23.7     02-19    145  |  101  |  32<H>  ----------------------------<  187<H>  3.8   |  29  |  0.72    Ca    9.0      19 Feb 2019 03:50  Phos  3.4     02-19  Mg     2.1     02-19    TPro  6.2  /  Alb  2.8<L>  /  TBili  0.2  /  DBili  x   /  AST  22  /  ALT  22  /  AlkPhos  163<H>  02-19    PT/INR - ( 19 Feb 2019 03:50 )   PT: 13.2 SEC;   INR: 1.18          PTT - ( 19 Feb 2019 03:50 )  PTT:47.3 SEC          MICROBIOLOGY:     RADIOLOGY:  [ ] Reviewed and interpreted by me    EKG: CHIEF COMPLAINT:    Interval Events:  Spiked fever overnight. Sedated this AM but appeared comfortable. Opened her eyes.     REVIEW OF SYSTEMS:  [X] Unable to assess ROS because Intubated/sedated    OBJECTIVE:  ICU Vital Signs Last 24 Hrs  T(C): 38.2 (19 Feb 2019 04:00), Max: 38.2 (19 Feb 2019 04:00)  T(F): 100.7 (19 Feb 2019 04:00), Max: 100.7 (19 Feb 2019 04:00)  HR: 81 (19 Feb 2019 06:00) (71 - 119)  BP: 75/54 (19 Feb 2019 06:00) (75/54 - 133/89)  BP(mean): 62 (19 Feb 2019 06:00) (61 - 102)  ABP: --  ABP(mean): --  RR: 25 (19 Feb 2019 06:00) (14 - 28)  SpO2: 98% (19 Feb 2019 06:00) (88% - 98%)    Mode: AC/ CMV (Assist Control/ Continuous Mandatory Ventilation), RR (machine): 28, TV (machine): 350, FiO2: 60, PEEP: 16, MAP: 22, PIP: 36    02-17 @ 07:01  -  02-18 @ 07:00  --------------------------------------------------------  IN: 2417.8 mL / OUT: 2465 mL / NET: -47.2 mL    02-18 @ 07:01  - 02-19 @ 06:57  --------------------------------------------------------  IN: 2218.7 mL / OUT: 3025 mL / NET: -806.3 mL      CAPILLARY BLOOD GLUCOSE      POCT Blood Glucose.: 194 mg/dL (19 Feb 2019 05:18)      PHYSICAL EXAM:  General: NAD  Respiratory: Unable to fully hear breath sounds with isolation stethoscope. Regular chest rise with ventilator. Intubated.  Cardiovascular: Unable to fully hear breath sounds. Regular on telemetry.   Abdomen: Soft, open abdomen with gauze  Extremities: No significant pitting edema  Skin: SC line on left  Neurological: sedated  Psychiatry: sedated/calm    LINES:    HOSPITAL MEDICATIONS:  MEDICATIONS  (STANDING):  chlorhexidine 0.12% Liquid 15 milliLiter(s) Swish and Spit every 12 hours  chlorhexidine 4% Liquid 1 Application(s) Topical <User Schedule>  collagenase Ointment 1 Application(s) Topical daily  dexmedetomidine Infusion 0.2 MICROgram(s)/kG/Hr (5.61 mL/Hr) IV Continuous <Continuous>  dextrose 5%. 1000 milliLiter(s) (50 mL/Hr) IV Continuous <Continuous>  dextrose 50% Injectable 12.5 Gram(s) IV Push once  dextrose 50% Injectable 25 Gram(s) IV Push once  dextrose 50% Injectable 25 Gram(s) IV Push once  ertapenem  IVPB 1000 milliGRAM(s) IV Intermittent every 24 hours  fentaNYL   Infusion. 2 MICROgram(s)/kG/Hr (22.44 mL/Hr) IV Continuous <Continuous>  furosemide   Injectable 40 milliGRAM(s) IV Push every 8 hours  heparin  Injectable 7500 Unit(s) SubCutaneous every 8 hours  influenza   Vaccine 0.5 milliLiter(s) IntraMuscular once  insulin lispro (HumaLOG) corrective regimen sliding scale   SubCutaneous every 6 hours  insulin NPH human recombinant 8 Unit(s) SubCutaneous every 6 hours  levothyroxine Injectable 100 MICROGram(s) IV Push at bedtime  methylPREDNISolone sodium succinate Injectable 10 milliGRAM(s) IV Push daily  pantoprazole  Injectable 40 milliGRAM(s) IV Push daily  petrolatum Ophthalmic Ointment 1 Application(s) Both EYES every 12 hours  propofol Infusion 50 MICROgram(s)/kG/Min (33.66 mL/Hr) IV Continuous <Continuous>  sildenafil (REVATIO) 20 milliGRAM(s) Oral three times a day  simvastatin 20 milliGRAM(s) Oral at bedtime  sodium chloride 3%  Inhalation 4 milliLiter(s) Inhalation every 6 hours    MEDICATIONS  (PRN):  acetaminophen    Suspension .. 650 milliGRAM(s) Oral every 6 hours PRN Temp greater or equal to 38C (100.4F), Mild Pain (1 - 3), Moderate Pain (4 - 6)  dextrose 40% Gel 15 Gram(s) Oral once PRN Blood Glucose LESS THAN 70 milliGRAM(s)/deciliter  glucagon  Injectable 1 milliGRAM(s) IntraMuscular once PRN Glucose LESS THAN 70 milligrams/deciliter  glucagon  Injectable 1 milliGRAM(s) IntraMuscular once PRN Glucose LESS THAN 70 milligrams/deciliter      LABS:                        6.8    10.22 )-----------( 512      ( 19 Feb 2019 03:50 )             23.7     02-19    145  |  101  |  32<H>  ----------------------------<  187<H>  3.8   |  29  |  0.72    Ca    9.0      19 Feb 2019 03:50  Phos  3.4     02-19  Mg     2.1     02-19    TPro  6.2  /  Alb  2.8<L>  /  TBili  0.2  /  DBili  x   /  AST  22  /  ALT  22  /  AlkPhos  163<H>  02-19    PT/INR - ( 19 Feb 2019 03:50 )   PT: 13.2 SEC;   INR: 1.18          PTT - ( 19 Feb 2019 03:50 )  PTT:47.3 SEC          MICROBIOLOGY:     RADIOLOGY:  [ ] Reviewed and interpreted by me    EKG:

## 2019-02-19 NOTE — PROGRESS NOTE ADULT - ASSESSMENT
*IN PROGRESS*    63 year old female with PMHx of HTN, DM, hypothyroidism, RA (prednisone 10mg), pulmonary HTN, CHRIS, and COPD (3L at home) who was originally admitted on 12/15 for SBO s/p ex lap with lysis of adhesions (4 retention sutures) c/b 12/30 she eviscerated 2/2 a coughing episode s/p ex lap (6 retention sutures) c/b multifocal pneumonia, developed hypercapnic respiratory failure requiring intubation 1/6, s/p bronch 1/9/18, and extubated on 1/24, course further c/b Enterococci bacteremia now readmitted to MICU for hypoxic respiratory failure 2/2 atelectasis requiring intubation 01/31. Overnight had grown ESBL in urine, now on ertapenem and levophed.     #Neuro:   - Intubated/sedated   - Continue propofol and fentanyl, precedex.    #Respiratory   1) Hypoxic Respiratory Failure 2/2 atelectasis requiring intubation 1/31   - Bronchoscopy showing findings concerning for hyperdynamic airway collapse.   - Patient was deconditioned and unable to sit up and participate with PT leading to a functional shunt   - At this time, will continue ventilatory support   - Will likely need tracheostomy  - Given pressor support will diurese for goal of net even or slightly negative.   - c/w Sildenafil 20 TID - BPs tolerating; Off NO; on 100% FiO2  - Reinstating solumedrol 10 mg daily    #Cardiovascular  1) Cardiogenic shock 2/2 iatrogenic   - Prior bedside US showing LVOT; currently not concerned for LVOT on most recent POCUS  - Continue to hold amlodipine 10mg, and metoprolol 25mg BID    - c/w Simvastatin 20mg     #GI  - SBO s/p ex-lap with findings of healthy bowel c/b evisceration of bowel requiring placement of retention sutures  - NPO with tube feeds   - Continue Protonix    #Renal/Metabolic  - Monitor for electrolyte derangements in the setting of diuresis ; replete as needed  - trend BMP for BUN/Cr    #ID   - Off antibiotics as per discussion w/ ID - had 27 days of Vanc, 3 days of Ceftriaxone, and 8 days of Zosyn for enterococcal bacteremia and Klebsiella sputum culture   - Urine cx with ESBL 10-50k colonies and candida, potentially colonized, but given levophed requirement will continue and reassess in 24 hours.   - C diff negative    # Heme:  - Downtrending hemoglobin, likely from blood draws, will need repeat CBC and T+S    #Endocrine  1) Diabetes Mellitus Type 2  - NPH 8 q6; off insulin gtt, decreased from NPH 24 Q6  - Gabapentin 400 TID held for now     2) Hypothyroidism   - c/w 100 mcg levothyroxine IV     # Rheumatology  1) Rheumatoid arthritis   - Titrate off steroids - IV Dex 5 for 7 days; day 2     #DVT ppx  - Heparin SQ     # Ethics:   [facility] discussion ongoing with Palliative Care team. Son (HCP) would like to give the patient time on the ventilatory before making any decisions regarding tracheostomy ; per palliative, daughter will be coming in this week to assist with decision making process     Cathryn Ackerman MD  Internal Medicine, PGY-3  Pager [phone] 63 year old female with PMHx of HTN, DM, hypothyroidism, RA (prednisone 10mg), pulmonary HTN, CHRIS, and COPD (3L at home) who was originally admitted on 12/15 for SBO s/p ex lap with lysis of adhesions (4 retention sutures) c/b 12/30 she eviscerated 2/2 a coughing episode s/p ex lap (6 retention sutures) c/b multifocal pneumonia, developed hypercapnic respiratory failure requiring intubation 1/6, s/p bronch 1/9/18, and extubated on 1/24, course further c/b Enterococci bacteremia now readmitted to MICU for hypoxic respiratory failure 2/2 atelectasis requiring intubation 01/31. Overnight had grown ESBL in urine, now on ertapenem and levophed.     #Neuro:   - Intubated/sedated   - Continue propofol and fentanyl, precedex. Wean as tolerated.   - Appeared to blink and open eyes today when sedation was decreased.     # Skin: L SC line    #DVT ppx  - Heparin 7500 SQ TID    #ID   - Off antibiotics as per discussion w/ ID - had 27 days of Vanc, 3 days of Ceftriaxone, and 8 days of Zosyn for enterococcal bacteremia and Klebsiella sputum culture   - Urine cx with ESBL 10-50k colonies and candida, potentially colonized, but given levophed requirement will continue and reassess in 24 hours.   - C diff negative    #Cardiovascular  1) Cardiogenic shock 2/2 iatrogenic. Previously reported to have cardiogenic shock. Does not appear overloaded currently.   - Continue to hold home blood pressure medications  - Off pressors and not on inotropic agents  - c/w Simvastatin 20mg     #GI  - SBO s/p ex-lap with findings of healthy bowel c/b evisceration of bowel requiring placement of retention sutures  - NPO with tube feeds   - Continue Protonix    #Renal/Metabolic  - Monitor for electrolyte derangements in the setting of diuresis ; replete as needed  - trend BMP for BUN/Cr      # Heme:  - Downtrending hemoglobin, likely from blood draws, will need repeat CBC and T+S    #Endocrine  1) Diabetes Mellitus Type 2  - NPH 8 q6; off insulin gtt, decreased from NPH 24 Q6  - Gabapentin 400 TID held for now     2) Hypothyroidism   - c/w 100 mcg levothyroxine IV     # Rheumatology  1) Rheumatoid arthritis   - Titrate off steroids - IV Dex 5 for 7 days; day 2           #Respiratory   1) Hypoxic Respiratory Failure 2/2 atelectasis requiring intubation 1/31.   - Concern for prolonged intubation and deconditioning. Has had prolonged hospital course with multiple intubations. Previous bronchoscopy w/ findings concerning for hyperdynamic airway collapse.   - C/w sildenafil 20 TID, now off NO.   - Continue solumedrol 10 mg daily   - Will likely need tracheostomy  - Given pressor support will diurese for goal of net even or slightly negative.       # Ethics:   [facility] discussion ongoing with Palliative Care team. Son (HCP) would like to give the patient time on the ventilatory before making any decisions regarding tracheostomy ; per palliative, daughter will be coming in this week to assist with decision making process     Cathryn Ackerman MD  Internal Medicine, PGY-3  Pager [phone] 63 year old female with PMHx of HTN, DM, hypothyroidism, RA (prednisone 10mg), pulmonary HTN, CHRIS, and COPD (3L at home) who was originally admitted on 12/15 for SBO s/p ex lap with lysis of adhesions (4 retention sutures) c/b 12/30 she eviscerated 2/2 a coughing episode s/p ex lap (6 retention sutures) c/b multifocal pneumonia, developed hypercapnic respiratory failure requiring intubation 1/6, s/p bronch 1/9/18, and extubated on 1/24, course further c/b Enterococci bacteremia now readmitted to MICU for hypoxic respiratory failure 2/2 atelectasis requiring intubation 01/31. Overnight had grown ESBL in urine, now on ertapenem and levophed.     #Neuro:   - Intubated/sedated   - Continue propofol and fentanyl, precedex. Wean as tolerated for mental status check.  - Appeared to blink and open eyes today when sedation was decreased.     # Skin: L SC line    #GI  - SBO s/p ex-lap with findings of healthy bowel c/b evisceration of bowel requiring placement of retention sutures  - NPO with tube feeds   - Will touch base with surgical service  - Continue Protonix    #Renal/Metabolic  - Monitor for electrolyte derangements in the setting of diuresis; replete as needed  - trend BMP for BUN/Cr  - Will closely monitor Na with diuresis. Plan for titration of free water to prevent hypernatremia and lasix.     #Endocrine  1) Diabetes Mellitus Type 2  - NPH 8 q6; off insulin gtt, decreased from NPH 24 Q6  - Gabapentin 400 TID held for now     2) Hypothyroidism   - c/w 100 mcg levothyroxine IV     3.) Rheumatoid arthritis:   - Continue solumedrol 10 daily, however monitor FSGs closely for hyperglycemia    # Volume status: Appears euvolemic today with net negative fluid status in last 24 hours and now off pressors    # Heme:  - Downtrending hemoglobin, likely from blood draws and bone marrow suppression  - 1 unit pRBCs today     #DVT ppx  - Heparin 7500 SQ TID    #ID   - Off antibiotics as per discussion w/ ID - had 27 days of Vanc, 3 days of Ceftriaxone, and 8 days of Zosyn for enterococcal bacteremia and Klebsiella sputum culture   - Urine cx with ESBL 10-50k colonies and candida, potentially colonized, but given levophed requirement will continue and reassess in 24 hours.   - C diff negative    #Cardiovascular  1) Previously reported to have cardiogenic shock. Does not appear overloaded currently, not requiring inotropes or pressors.  - Continue to hold home blood pressure medications  - c/w Simvastatin 20mg   - c/w lasix 80 Q8     #Respiratory   1) Hypoxic Respiratory Failure 2/2 atelectasis requiring intubation 1/31.   - Concern for prolonged intubation and deconditioning. Has had prolonged hospital course with multiple intubations. Previous bronchoscopy w/ findings concerning for hyperdynamic airway collapse.   - C/w sildenafil 20 TID, now off NO.   - Continue solumedrol 10 mg daily   - Will likely need tracheostomy  - Given pressor support will diurese for goal of net even or slightly negative.     # Ethics:   - C discussion ongoing with Palliative Care team. Pending reconsult.   - Will need further discussions with family including son (HCP) and daughter coming in, regarding potential tracheostomy    Cathryn Ackerman MD  Internal Medicine, PGY-3  Pager [phone]

## 2019-02-19 NOTE — PROGRESS NOTE ADULT - ATTENDING COMMENTS
Patient examined and case reviewed in detail on bedside rounds  Critically ill on vent Fails SBT today Needs trach  Frequent bedside visits with therapy change today.   Crit Care Time Today 35 min+
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
## 2019-02-19 NOTE — CONSULT NOTE ADULT - PROBLEM SELECTOR RECOMMENDATION 9
Surrogate struggling with health care decisions. Health care proxy may benefit from being able to notify mother of need for tracheostomy if patient's mental status could afford the opportunity for that discussion. Ethics will meet with son at bedside to aid in discussion

## 2019-02-20 LAB
ALBUMIN SERPL ELPH-MCNC: 2.8 G/DL — LOW (ref 3.3–5)
ALP SERPL-CCNC: 160 U/L — HIGH (ref 40–120)
ALT FLD-CCNC: 22 U/L — SIGNIFICANT CHANGE UP (ref 4–33)
ANION GAP SERPL CALC-SCNC: 16 MMO/L — HIGH (ref 7–14)
APTT BLD: 50.3 SEC — HIGH (ref 27.5–36.3)
AST SERPL-CCNC: 22 U/L — SIGNIFICANT CHANGE UP (ref 4–32)
BASOPHILS # BLD AUTO: 0.14 K/UL — SIGNIFICANT CHANGE UP (ref 0–0.2)
BASOPHILS NFR BLD AUTO: 1.4 % — SIGNIFICANT CHANGE UP (ref 0–2)
BILIRUB SERPL-MCNC: 0.5 MG/DL — SIGNIFICANT CHANGE UP (ref 0.2–1.2)
BUN SERPL-MCNC: 40 MG/DL — HIGH (ref 7–23)
CALCIUM SERPL-MCNC: 8.9 MG/DL — SIGNIFICANT CHANGE UP (ref 8.4–10.5)
CHLORIDE SERPL-SCNC: 99 MMOL/L — SIGNIFICANT CHANGE UP (ref 98–107)
CO2 SERPL-SCNC: 29 MMOL/L — SIGNIFICANT CHANGE UP (ref 22–31)
CREAT SERPL-MCNC: 0.79 MG/DL — SIGNIFICANT CHANGE UP (ref 0.5–1.3)
EOSINOPHIL # BLD AUTO: 0.36 K/UL — SIGNIFICANT CHANGE UP (ref 0–0.5)
EOSINOPHIL NFR BLD AUTO: 3.5 % — SIGNIFICANT CHANGE UP (ref 0–6)
GLUCOSE BLDC GLUCOMTR-MCNC: 238 MG/DL — HIGH (ref 70–99)
GLUCOSE BLDC GLUCOMTR-MCNC: 242 MG/DL — HIGH (ref 70–99)
GLUCOSE BLDC GLUCOMTR-MCNC: 272 MG/DL — HIGH (ref 70–99)
GLUCOSE SERPL-MCNC: 222 MG/DL — HIGH (ref 70–99)
HCT VFR BLD CALC: 27.6 % — LOW (ref 34.5–45)
HGB BLD-MCNC: 8.1 G/DL — LOW (ref 11.5–15.5)
IMM GRANULOCYTES NFR BLD AUTO: 3.6 % — HIGH (ref 0–1.5)
INR BLD: 1.14 — SIGNIFICANT CHANGE UP (ref 0.88–1.17)
LYMPHOCYTES # BLD AUTO: 2.22 K/UL — SIGNIFICANT CHANGE UP (ref 1–3.3)
LYMPHOCYTES # BLD AUTO: 21.6 % — SIGNIFICANT CHANGE UP (ref 13–44)
MAGNESIUM SERPL-MCNC: 2.2 MG/DL — SIGNIFICANT CHANGE UP (ref 1.6–2.6)
MCHC RBC-ENTMCNC: 29.2 PG — SIGNIFICANT CHANGE UP (ref 27–34)
MCHC RBC-ENTMCNC: 29.3 % — LOW (ref 32–36)
MCV RBC AUTO: 99.6 FL — SIGNIFICANT CHANGE UP (ref 80–100)
MONOCYTES # BLD AUTO: 0.85 K/UL — SIGNIFICANT CHANGE UP (ref 0–0.9)
MONOCYTES NFR BLD AUTO: 8.3 % — SIGNIFICANT CHANGE UP (ref 2–14)
NEUTROPHILS # BLD AUTO: 6.32 K/UL — SIGNIFICANT CHANGE UP (ref 1.8–7.4)
NEUTROPHILS NFR BLD AUTO: 61.6 % — SIGNIFICANT CHANGE UP (ref 43–77)
NRBC # FLD: 0.74 K/UL — LOW (ref 25–125)
NRBC FLD-RTO: 7.2 — SIGNIFICANT CHANGE UP
PHOSPHATE SERPL-MCNC: 3.5 MG/DL — SIGNIFICANT CHANGE UP (ref 2.5–4.5)
PLATELET # BLD AUTO: 460 K/UL — HIGH (ref 150–400)
PMV BLD: 9.9 FL — SIGNIFICANT CHANGE UP (ref 7–13)
POTASSIUM SERPL-MCNC: 3.6 MMOL/L — SIGNIFICANT CHANGE UP (ref 3.5–5.3)
POTASSIUM SERPL-SCNC: 3.6 MMOL/L — SIGNIFICANT CHANGE UP (ref 3.5–5.3)
PROT SERPL-MCNC: 6.1 G/DL — SIGNIFICANT CHANGE UP (ref 6–8.3)
PROTHROM AB SERPL-ACNC: 13 SEC — SIGNIFICANT CHANGE UP (ref 9.8–13.1)
RBC # BLD: 2.77 M/UL — LOW (ref 3.8–5.2)
RBC # FLD: 24 % — HIGH (ref 10.3–14.5)
SODIUM SERPL-SCNC: 144 MMOL/L — SIGNIFICANT CHANGE UP (ref 135–145)
WBC # BLD: 10.26 K/UL — SIGNIFICANT CHANGE UP (ref 3.8–10.5)
WBC # FLD AUTO: 10.26 K/UL — SIGNIFICANT CHANGE UP (ref 3.8–10.5)

## 2019-02-20 PROCEDURE — 99291 CRITICAL CARE FIRST HOUR: CPT | Mod: 25

## 2019-02-20 PROCEDURE — 99232 SBSQ HOSP IP/OBS MODERATE 35: CPT

## 2019-02-20 PROCEDURE — 76604 US EXAM CHEST: CPT | Mod: 26

## 2019-02-20 RX ORDER — HUMAN INSULIN 100 [IU]/ML
18 INJECTION, SUSPENSION SUBCUTANEOUS EVERY 6 HOURS
Qty: 0 | Refills: 0 | Status: DISCONTINUED | OUTPATIENT
Start: 2019-02-20 | End: 2019-02-24

## 2019-02-20 RX ADMIN — Medication 1 APPLICATION(S): at 17:24

## 2019-02-20 RX ADMIN — Medication 6: at 17:33

## 2019-02-20 RX ADMIN — CHLORHEXIDINE GLUCONATE 15 MILLILITER(S): 213 SOLUTION TOPICAL at 05:53

## 2019-02-20 RX ADMIN — DEXMEDETOMIDINE HYDROCHLORIDE IN 0.9% SODIUM CHLORIDE 5.61 MICROGRAM(S)/KG/HR: 4 INJECTION INTRAVENOUS at 10:37

## 2019-02-20 RX ADMIN — HUMAN INSULIN 14 UNIT(S): 100 INJECTION, SUSPENSION SUBCUTANEOUS at 05:54

## 2019-02-20 RX ADMIN — CHLORHEXIDINE GLUCONATE 1 APPLICATION(S): 213 SOLUTION TOPICAL at 10:38

## 2019-02-20 RX ADMIN — FENTANYL CITRATE 22.44 MICROGRAM(S)/KG/HR: 50 INJECTION INTRAVENOUS at 17:34

## 2019-02-20 RX ADMIN — Medication 4: at 05:54

## 2019-02-20 RX ADMIN — DEXMEDETOMIDINE HYDROCHLORIDE IN 0.9% SODIUM CHLORIDE 5.61 MICROGRAM(S)/KG/HR: 4 INJECTION INTRAVENOUS at 17:34

## 2019-02-20 RX ADMIN — SODIUM CHLORIDE 4 MILLILITER(S): 9 INJECTION INTRAMUSCULAR; INTRAVENOUS; SUBCUTANEOUS at 04:30

## 2019-02-20 RX ADMIN — Medication 40 MILLIGRAM(S): at 12:16

## 2019-02-20 RX ADMIN — SODIUM CHLORIDE 4 MILLILITER(S): 9 INJECTION INTRAMUSCULAR; INTRAVENOUS; SUBCUTANEOUS at 22:20

## 2019-02-20 RX ADMIN — CHLORHEXIDINE GLUCONATE 15 MILLILITER(S): 213 SOLUTION TOPICAL at 17:24

## 2019-02-20 RX ADMIN — Medication 4: at 11:17

## 2019-02-20 RX ADMIN — Medication 20 MILLIGRAM(S): at 05:55

## 2019-02-20 RX ADMIN — Medication 20 MILLIGRAM(S): at 22:45

## 2019-02-20 RX ADMIN — HEPARIN SODIUM 7500 UNIT(S): 5000 INJECTION INTRAVENOUS; SUBCUTANEOUS at 05:53

## 2019-02-20 RX ADMIN — Medication 40 MILLIGRAM(S): at 22:45

## 2019-02-20 RX ADMIN — ERTAPENEM SODIUM 120 MILLIGRAM(S): 1 INJECTION, POWDER, LYOPHILIZED, FOR SOLUTION INTRAMUSCULAR; INTRAVENOUS at 05:24

## 2019-02-20 RX ADMIN — FENTANYL CITRATE 22.44 MICROGRAM(S)/KG/HR: 50 INJECTION INTRAVENOUS at 10:37

## 2019-02-20 RX ADMIN — SIMVASTATIN 20 MILLIGRAM(S): 20 TABLET, FILM COATED ORAL at 22:45

## 2019-02-20 RX ADMIN — HEPARIN SODIUM 7500 UNIT(S): 5000 INJECTION INTRAVENOUS; SUBCUTANEOUS at 13:30

## 2019-02-20 RX ADMIN — HEPARIN SODIUM 7500 UNIT(S): 5000 INJECTION INTRAVENOUS; SUBCUTANEOUS at 22:46

## 2019-02-20 RX ADMIN — HUMAN INSULIN 14 UNIT(S): 100 INJECTION, SUSPENSION SUBCUTANEOUS at 11:17

## 2019-02-20 RX ADMIN — PANTOPRAZOLE SODIUM 40 MILLIGRAM(S): 20 TABLET, DELAYED RELEASE ORAL at 12:16

## 2019-02-20 RX ADMIN — Medication 100 MICROGRAM(S): at 22:47

## 2019-02-20 RX ADMIN — HUMAN INSULIN 18 UNIT(S): 100 INJECTION, SUSPENSION SUBCUTANEOUS at 17:33

## 2019-02-20 RX ADMIN — Medication 1 APPLICATION(S): at 11:19

## 2019-02-20 RX ADMIN — Medication 10 MILLIGRAM(S): at 05:53

## 2019-02-20 RX ADMIN — SODIUM CHLORIDE 4 MILLILITER(S): 9 INJECTION INTRAMUSCULAR; INTRAVENOUS; SUBCUTANEOUS at 10:05

## 2019-02-20 RX ADMIN — Medication 20 MILLIGRAM(S): at 13:30

## 2019-02-20 RX ADMIN — Medication 40 MILLIGRAM(S): at 05:52

## 2019-02-20 RX ADMIN — Medication 1 APPLICATION(S): at 05:53

## 2019-02-20 RX ADMIN — SODIUM CHLORIDE 4 MILLILITER(S): 9 INJECTION INTRAMUSCULAR; INTRAVENOUS; SUBCUTANEOUS at 15:49

## 2019-02-20 NOTE — PROGRESS NOTE ADULT - ATTENDING COMMENTS
Patient examined and case reviewed in detail on bedside rounds  Critically ill on vent Fails SBT D/W family re trach  Frequent bedside visits with therapy change today.   Crit Care Time Today 35 min+

## 2019-02-20 NOTE — CHART NOTE - NSCHARTNOTEFT_GEN_A_CORE
:    Chadd Alvarado MD PGY3/[facility] Pager #: [phone]  [facility] Pager #: [phone]    INDICATION:    Resp. Failure     PROCEDURE:  [ ] LIMITED ECHO  [x ] LIMITED CHEST  [ ] LIMITED RETROPERITONEAL  [ ] LIMITED ABDOMINAL  [ ] LIMITED DVT  [ ] NEEDLE GUIDANCE VASCULAR  [ ] NEEDLE GUIDANCE THORACENTESIS  [ ] NEEDLE GUIDANCE PARACENTESIS  [ ] NEEDLE GUIDANCE PERICARDIOCENTESIS  [ ] OTHER    FINDINGS:    Lungs: A-line predominant         INTERPRETATION:    Resolving atelectatics
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
## 2019-02-20 NOTE — PROGRESS NOTE ADULT - ASSESSMENT
**In progress**    63 year old female with PMHx of HTN, DM, hypothyroidism, RA (prednisone 10mg), pulmonary HTN, CHRIS, and COPD (3L at home) who was originally admitted on 12/15 for SBO s/p ex lap with lysis of adhesions (4 retention sutures) c/b 12/30 she eviscerated 2/2 a coughing episode s/p ex lap (6 retention sutures) c/b multifocal pneumonia, developed hypercapnic respiratory failure requiring intubation 1/6, s/p bronch 1/9/18, and extubated on 1/24, course further c/b Enterococci bacteremia now readmitted to MICU for hypoxic respiratory failure 2/2 atelectasis requiring intubation 01/31. Overnight had grown ESBL in urine, now on ertapenem and levophed.     #Neuro:   - Intubated/sedated   - Continue propofol and fentanyl, precedex. Wean as tolerated for mental status check.  - Was able to open eyes yesterday as sedation was lightly weaned.    # Skin: L SC line    #GI  - SBO s/p ex-lap with findings of healthy bowel c/b evisceration of bowel requiring placement of retention sutures  - NPO with tube feeds   - Continue Protonix    #Renal/Metabolic  - Monitor for electrolyte derangements in the setting of diuresis; replete as needed  - trend BMP for BUN/Cr  - Will closely monitor Na with diuresis. Plan for titration of free water to prevent hypernatremia and lasix.     #Endocrine  1) Diabetes Mellitus Type 2  - NPH 8 q6; off insulin gtt, decreased from NPH 24 Q6  - Gabapentin 400 TID held for now     2) Hypothyroidism   - c/w 100 mcg levothyroxine IV     3.) Rheumatoid arthritis:   - Continue solumedrol 10 daily, however monitor FSGs closely for hyperglycemia    # Volume status: Appears euvolemic today with net negative fluid status in last 24 hours and now off pressors    # Heme:  - Downtrending hemoglobin, likely from blood draws and bone marrow suppression  - 1 unit pRBCs today     #DVT ppx  - Heparin 7500 SQ TID    #ID   - Off antibiotics as per discussion w/ ID - had 27 days of Vanc, 3 days of Ceftriaxone, and 8 days of Zosyn for enterococcal bacteremia and Klebsiella sputum culture   - Urine cx with ESBL 10-50k colonies and candida, potentially colonized, but given levophed requirement will continue and reassess in 24 hours.   - C diff negative    #Cardiovascular  1) Previously reported to have cardiogenic shock. Does not appear overloaded currently, not requiring inotropes or pressors.  - Scattered B-lines with normal cardiac function on yesterday's POCUS  - Overall net positive 1 L yesterday, consider extra dose of lasix today  - Continue to hold home blood pressure medications  - c/w Simvastatin 20mg   - c/w lasix 80 Q8     #Respiratory   1) Hypoxic Respiratory Failure 2/2 atelectasis requiring intubation 1/31.   - Concern for prolonged intubation and deconditioning. Has had prolonged hospital course with multiple intubations. Previous bronchoscopy w/ findings concerning for hyperdynamic airway collapse.   - C/w sildenafil 20 TID, now off NO.   - Continue solumedrol 10 mg daily   - Will likely need tracheostomy  - Ethics involved in tracheostomy discussion    # Ethics:   - [facility] discussion ongoing with Palliative Care team. Pending reconsult.   - Will need further discussions with family including son (HCP) and daughter coming in, regarding potential tracheostomy with ethics assistance    Cathryn Ackerman MD  Internal Medicine, PGY-3  Pager [phone] 63 year old female with PMHx of HTN, DM, hypothyroidism, RA (prednisone 10mg), pulmonary HTN, CHRIS, and COPD (3L at home) who was originally admitted on 12/15 for SBO s/p ex lap with lysis of adhesions (4 retention sutures) c/b 12/30 she eviscerated 2/2 a coughing episode s/p ex lap (6 retention sutures) c/b multifocal pneumonia, developed hypercapnic respiratory failure requiring intubation 1/6, s/p bronch 1/9/18, and extubated on 1/24, course further c/b Enterococci bacteremia now readmitted to MICU for hypoxic respiratory failure 2/2 atelectasis requiring intubation 01/31. Overnight had grown ESBL in urine, now on ertapenem and levophed.     #Neuro:   - Intubated/sedated   - Continue propofol and fentanyl, precedex. Wean as tolerated for mental status check.  - Was able to open eyes yesterday as sedation was lightly weaned.    # Skin: L SC line 1/31    #GI  - SBO s/p ex-lap with findings of healthy bowel c/b evisceration of bowel requiring placement of retention sutures  - NPO with tube feeds   - Continue Protonix  - s/w surgery, plan to follow-up patient    #Renal/Metabolic  - Monitor for electrolyte derangements in the setting of diuresis; replete as needed  - trend BMP for BUN/Cr  - Will closely monitor Na with diuresis. Holding further free water flushes given overall net positive in last 24 hours    #Endocrine  1) Diabetes Mellitus Type 2  - Increased to NPH 14 q6; off insulin gtt, decreased from NPH 24 Q6  - Gabapentin 400 TID held for now   - Endocrinology recommendations appreciated    2) Hypothyroidism   - c/w 100 mcg levothyroxine IV     3.) Rheumatoid arthritis:   - Continue solumedrol 10 daily, however monitor FSGs closely for hyperglycemia    # Volume status:   - Overall net positive 1 L in last 24 hours  - Stopping free water flushes, goal net negative 1-2 L daily    # Heme:  - Downtrending hemoglobin, likely from blood draws and bone marrow suppression  - s/p 1 unit pRBCs yesterday (2/20)    #DVT ppx  - Heparin 7500 SQ TID    #ID   - Continue ertapenem daily (day 3 of 5)   - Off previous antibiotics as per discussion w/ ID - had 27 days of Vanc, 3 days of Ceftriaxone, and 8 days of Zosyn for enterococcal bacteremia and Klebsiella sputum culture   - Urine cx with ESBL 10-50k colonies and candida, potentially colonized  - C diff negative    #Cardiovascular  1) Previously reported to have cardiogenic shock. Overloaded currently, not requiring inotropes or pressors.  - Scattered B-lines with normal cardiac function on yesterday's POCUS  - Overall net positive 1 L yesterday, consider extra dose of lasix today  - Continue to hold home blood pressure medications  - c/w Simvastatin 20mg   - c/w lasix 80 Q8     #Respiratory   1) Hypoxic Respiratory Failure 2/2 atelectasis requiring intubation 1/31.   - Concern for prolonged intubation and deconditioning. Has had prolonged hospital course with multiple intubations. Previous bronchoscopy w/ findings concerning for hyperdynamic airway collapse.   - C/w sildenafil 20 TID, now off NO.   - Continue solumedrol 10 mg daily   - Will likely need tracheostomy  - Ethics involved in tracheostomy discussion    # Ethics:   - [facility] discussion ongoing with Palliative Care team. Pending reconsult.   - Will need further discussions with family including son (HCP) and daughter coming in, regarding potential tracheostomy with ethics assistance  - Complicated family dynamic. Family member called police yesterday.     Cathryn Ackerman MD  Internal Medicine, PGY-3  Pager [phone]

## 2019-02-20 NOTE — PROGRESS NOTE ADULT - PROBLEM SELECTOR PLAN 1
BG elevated above goal of 140-180 mg/dl.- Steroids increased   Consider increasing NPH to 18 U q 6 hours (hold if feeds are held) and continue moderate dose humalog correctional scale q6h.

## 2019-02-20 NOTE — PROGRESS NOTE ADULT - SUBJECTIVE AND OBJECTIVE BOX
Chief Complaint: t2dm    History:  patient is on steroids, on continuous tube feeds, remains intuabted on precedex gtt    MEDICATIONS  (STANDING):  chlorhexidine 0.12% Liquid 15 milliLiter(s) Swish and Spit every 12 hours  chlorhexidine 4% Liquid 1 Application(s) Topical <User Schedule>  collagenase Ointment 1 Application(s) Topical daily  dexmedetomidine Infusion 0.2 MICROgram(s)/kG/Hr (5.61 mL/Hr) IV Continuous <Continuous>  dextrose 5%. 1000 milliLiter(s) (50 mL/Hr) IV Continuous <Continuous>  dextrose 50% Injectable 12.5 Gram(s) IV Push once  dextrose 50% Injectable 25 Gram(s) IV Push once  dextrose 50% Injectable 25 Gram(s) IV Push once  ertapenem  IVPB 1000 milliGRAM(s) IV Intermittent every 24 hours  fentaNYL   Infusion. 2 MICROgram(s)/kG/Hr (22.44 mL/Hr) IV Continuous <Continuous>  furosemide   Injectable 40 milliGRAM(s) IV Push every 8 hours  heparin  Injectable 7500 Unit(s) SubCutaneous every 8 hours  influenza   Vaccine 0.5 milliLiter(s) IntraMuscular once  insulin lispro (HumaLOG) corrective regimen sliding scale   SubCutaneous every 6 hours  insulin NPH human recombinant 14 Unit(s) SubCutaneous every 6 hours  levothyroxine Injectable 100 MICROGram(s) IV Push at bedtime  methylPREDNISolone sodium succinate Injectable 10 milliGRAM(s) IV Push daily  metolazone 5 milliGRAM(s) Oral daily  pantoprazole  Injectable 40 milliGRAM(s) IV Push daily  petrolatum Ophthalmic Ointment 1 Application(s) Both EYES every 12 hours  sildenafil (REVATIO) 20 milliGRAM(s) Oral three times a day  simvastatin 20 milliGRAM(s) Oral at bedtime  sodium chloride 3%  Inhalation 4 milliLiter(s) Inhalation every 6 hours    MEDICATIONS  (PRN):  acetaminophen    Suspension .. 650 milliGRAM(s) Oral every 6 hours PRN Temp greater or equal to 38C (100.4F), Mild Pain (1 - 3), Moderate Pain (4 - 6)  dextrose 40% Gel 15 Gram(s) Oral once PRN Blood Glucose LESS THAN 70 milliGRAM(s)/deciliter  glucagon  Injectable 1 milliGRAM(s) IntraMuscular once PRN Glucose LESS THAN 70 milligrams/deciliter  glucagon  Injectable 1 milliGRAM(s) IntraMuscular once PRN Glucose LESS THAN 70 milligrams/deciliter      Allergies    No Known Allergies    Intolerances    Seroquel (Other)    Review of Systems:  UNABLE TO OBTAIN    PHYSICAL EXAM:  VITALS: T(C): 36.6 (02-20-19 @ 12:00)  T(F): 97.9 (02-20-19 @ 12:00), Max: 99.2 (02-19-19 @ 20:00)  HR: 61 (02-20-19 @ 12:00) (56 - 93)  BP: 103/71 (02-20-19 @ 12:00) (69/50 - 114/73)  RR:  (17 - 28)  SpO2:  (88% - 98%)  Wt(kg): --  GENERAL: remains acutely ill  GI: Soft, nontender, non distended  PSYCH: Pt is intubated, and sedated medically      CAPILLARY BLOOD GLUCOSE      POCT Blood Glucose.: 242 mg/dL (20 Feb 2019 11:15)  POCT Blood Glucose.: 238 mg/dL (20 Feb 2019 05:13)  POCT Blood Glucose.: 168 mg/dL (19 Feb 2019 23:32)  POCT Blood Glucose.: 259 mg/dL (19 Feb 2019 17:50)  POCT Blood Glucose.: 335 mg/dL (19 Feb 2019 12:41)      02-20    144  |  99  |  40<H>  ----------------------------<  222<H>  3.6   |  29  |  0.79    EGFR if : 94  EGFR if non : 81    Ca    8.9      02-20  Mg     2.2     02-20  Phos  3.5     02-20    TPro  6.1  /  Alb  2.8<L>  /  TBili  0.5  /  DBili  x   /  AST  22  /  ALT  22  /  AlkPhos  160<H>  02-20        Hemoglobin A1C, Whole Blood: 7.1 % <H> [4.0 - 5.6] (12-17-18 @ 03:50)  Hemoglobin A1C, Whole Blood: 7.3 % <H> [4.0 - 5.6] (12-16-18 @ 04:17)

## 2019-02-21 DIAGNOSIS — Z91.19 PATIENT'S NONCOMPLIANCE WITH OTHER MEDICAL TREATMENT AND REGIMEN: ICD-10-CM

## 2019-02-21 DIAGNOSIS — Z78.9 OTHER SPECIFIED HEALTH STATUS: ICD-10-CM

## 2019-02-21 LAB
ALBUMIN SERPL ELPH-MCNC: 3.1 G/DL — LOW (ref 3.3–5)
ALP SERPL-CCNC: 168 U/L — HIGH (ref 40–120)
ALT FLD-CCNC: 23 U/L — SIGNIFICANT CHANGE UP (ref 4–33)
ANION GAP SERPL CALC-SCNC: 17 MMO/L — HIGH (ref 7–14)
AST SERPL-CCNC: 27 U/L — SIGNIFICANT CHANGE UP (ref 4–32)
BASOPHILS # BLD AUTO: 0.13 K/UL — SIGNIFICANT CHANGE UP (ref 0–0.2)
BASOPHILS NFR BLD AUTO: 1.1 % — SIGNIFICANT CHANGE UP (ref 0–2)
BILIRUB SERPL-MCNC: 0.3 MG/DL — SIGNIFICANT CHANGE UP (ref 0.2–1.2)
BLD GP AB SCN SERPL QL: NEGATIVE — SIGNIFICANT CHANGE UP
BUN SERPL-MCNC: 41 MG/DL — HIGH (ref 7–23)
CALCIUM SERPL-MCNC: 9.3 MG/DL — SIGNIFICANT CHANGE UP (ref 8.4–10.5)
CHLORIDE SERPL-SCNC: 98 MMOL/L — SIGNIFICANT CHANGE UP (ref 98–107)
CO2 SERPL-SCNC: 29 MMOL/L — SIGNIFICANT CHANGE UP (ref 22–31)
CREAT SERPL-MCNC: 0.77 MG/DL — SIGNIFICANT CHANGE UP (ref 0.5–1.3)
EOSINOPHIL # BLD AUTO: 0.43 K/UL — SIGNIFICANT CHANGE UP (ref 0–0.5)
EOSINOPHIL NFR BLD AUTO: 3.6 % — SIGNIFICANT CHANGE UP (ref 0–6)
GLUCOSE BLDC GLUCOMTR-MCNC: 112 MG/DL — HIGH (ref 70–99)
GLUCOSE BLDC GLUCOMTR-MCNC: 116 MG/DL — HIGH (ref 70–99)
GLUCOSE BLDC GLUCOMTR-MCNC: 129 MG/DL — HIGH (ref 70–99)
GLUCOSE BLDC GLUCOMTR-MCNC: 172 MG/DL — HIGH (ref 70–99)
GLUCOSE BLDC GLUCOMTR-MCNC: 215 MG/DL — HIGH (ref 70–99)
GLUCOSE SERPL-MCNC: 141 MG/DL — HIGH (ref 70–99)
HCT VFR BLD CALC: 28.7 % — LOW (ref 34.5–45)
HGB BLD-MCNC: 8.4 G/DL — LOW (ref 11.5–15.5)
IMM GRANULOCYTES NFR BLD AUTO: 2.5 % — HIGH (ref 0–1.5)
LYMPHOCYTES # BLD AUTO: 19.7 % — SIGNIFICANT CHANGE UP (ref 13–44)
LYMPHOCYTES # BLD AUTO: 2.38 K/UL — SIGNIFICANT CHANGE UP (ref 1–3.3)
MAGNESIUM SERPL-MCNC: 2.1 MG/DL — SIGNIFICANT CHANGE UP (ref 1.6–2.6)
MCHC RBC-ENTMCNC: 29 PG — SIGNIFICANT CHANGE UP (ref 27–34)
MCHC RBC-ENTMCNC: 29.3 % — LOW (ref 32–36)
MCV RBC AUTO: 99 FL — SIGNIFICANT CHANGE UP (ref 80–100)
MONOCYTES # BLD AUTO: 1.04 K/UL — HIGH (ref 0–0.9)
MONOCYTES NFR BLD AUTO: 8.6 % — SIGNIFICANT CHANGE UP (ref 2–14)
NEUTROPHILS # BLD AUTO: 7.83 K/UL — HIGH (ref 1.8–7.4)
NEUTROPHILS NFR BLD AUTO: 64.5 % — SIGNIFICANT CHANGE UP (ref 43–77)
NRBC # FLD: 0.47 K/UL — LOW (ref 25–125)
NRBC FLD-RTO: 3.9 — SIGNIFICANT CHANGE UP
PHOSPHATE SERPL-MCNC: 3.5 MG/DL — SIGNIFICANT CHANGE UP (ref 2.5–4.5)
PLATELET # BLD AUTO: 465 K/UL — HIGH (ref 150–400)
PMV BLD: 10.4 FL — SIGNIFICANT CHANGE UP (ref 7–13)
POTASSIUM SERPL-MCNC: 3.7 MMOL/L — SIGNIFICANT CHANGE UP (ref 3.5–5.3)
POTASSIUM SERPL-SCNC: 3.7 MMOL/L — SIGNIFICANT CHANGE UP (ref 3.5–5.3)
PROT SERPL-MCNC: 6.4 G/DL — SIGNIFICANT CHANGE UP (ref 6–8.3)
RBC # BLD: 2.9 M/UL — LOW (ref 3.8–5.2)
RBC # FLD: 22.8 % — HIGH (ref 10.3–14.5)
RH IG SCN BLD-IMP: POSITIVE — SIGNIFICANT CHANGE UP
SODIUM SERPL-SCNC: 144 MMOL/L — SIGNIFICANT CHANGE UP (ref 135–145)
WBC # BLD: 12.11 K/UL — HIGH (ref 3.8–10.5)
WBC # FLD AUTO: 12.11 K/UL — HIGH (ref 3.8–10.5)

## 2019-02-21 PROCEDURE — 99291 CRITICAL CARE FIRST HOUR: CPT

## 2019-02-21 RX ORDER — ASCORBIC ACID 60 MG
1 TABLET,CHEWABLE ORAL
Qty: 0 | Refills: 0 | COMMUNITY

## 2019-02-21 RX ORDER — ALBUTEROL 90 UG/1
3 AEROSOL, METERED ORAL
Qty: 0 | Refills: 0 | COMMUNITY

## 2019-02-21 RX ORDER — LATANOPROST 0.05 MG/ML
1 SOLUTION/ DROPS OPHTHALMIC; TOPICAL
Qty: 0 | Refills: 0 | COMMUNITY

## 2019-02-21 RX ADMIN — CHLORHEXIDINE GLUCONATE 15 MILLILITER(S): 213 SOLUTION TOPICAL at 18:00

## 2019-02-21 RX ADMIN — Medication 20 MILLIGRAM(S): at 13:22

## 2019-02-21 RX ADMIN — HEPARIN SODIUM 7500 UNIT(S): 5000 INJECTION INTRAVENOUS; SUBCUTANEOUS at 13:23

## 2019-02-21 RX ADMIN — SIMVASTATIN 20 MILLIGRAM(S): 20 TABLET, FILM COATED ORAL at 21:11

## 2019-02-21 RX ADMIN — CHLORHEXIDINE GLUCONATE 15 MILLILITER(S): 213 SOLUTION TOPICAL at 07:25

## 2019-02-21 RX ADMIN — DEXMEDETOMIDINE HYDROCHLORIDE IN 0.9% SODIUM CHLORIDE 5.61 MICROGRAM(S)/KG/HR: 4 INJECTION INTRAVENOUS at 08:10

## 2019-02-21 RX ADMIN — PANTOPRAZOLE SODIUM 40 MILLIGRAM(S): 20 TABLET, DELAYED RELEASE ORAL at 11:17

## 2019-02-21 RX ADMIN — Medication 1 APPLICATION(S): at 05:02

## 2019-02-21 RX ADMIN — HEPARIN SODIUM 7500 UNIT(S): 5000 INJECTION INTRAVENOUS; SUBCUTANEOUS at 21:11

## 2019-02-21 RX ADMIN — SODIUM CHLORIDE 4 MILLILITER(S): 9 INJECTION INTRAMUSCULAR; INTRAVENOUS; SUBCUTANEOUS at 03:05

## 2019-02-21 RX ADMIN — SODIUM CHLORIDE 4 MILLILITER(S): 9 INJECTION INTRAMUSCULAR; INTRAVENOUS; SUBCUTANEOUS at 10:37

## 2019-02-21 RX ADMIN — DEXMEDETOMIDINE HYDROCHLORIDE IN 0.9% SODIUM CHLORIDE 5.61 MICROGRAM(S)/KG/HR: 4 INJECTION INTRAVENOUS at 21:12

## 2019-02-21 RX ADMIN — Medication 40 MILLIGRAM(S): at 05:02

## 2019-02-21 RX ADMIN — Medication 40 MILLIGRAM(S): at 13:22

## 2019-02-21 RX ADMIN — Medication 1 APPLICATION(S): at 11:11

## 2019-02-21 RX ADMIN — SODIUM CHLORIDE 4 MILLILITER(S): 9 INJECTION INTRAMUSCULAR; INTRAVENOUS; SUBCUTANEOUS at 16:56

## 2019-02-21 RX ADMIN — HUMAN INSULIN 18 UNIT(S): 100 INJECTION, SUSPENSION SUBCUTANEOUS at 11:16

## 2019-02-21 RX ADMIN — Medication 4: at 11:15

## 2019-02-21 RX ADMIN — HUMAN INSULIN 18 UNIT(S): 100 INJECTION, SUSPENSION SUBCUTANEOUS at 18:00

## 2019-02-21 RX ADMIN — FENTANYL CITRATE 22.44 MICROGRAM(S)/KG/HR: 50 INJECTION INTRAVENOUS at 21:12

## 2019-02-21 RX ADMIN — Medication 20 MILLIGRAM(S): at 05:04

## 2019-02-21 RX ADMIN — HUMAN INSULIN 18 UNIT(S): 100 INJECTION, SUSPENSION SUBCUTANEOUS at 05:20

## 2019-02-21 RX ADMIN — Medication 100 MICROGRAM(S): at 21:11

## 2019-02-21 RX ADMIN — Medication 10 MILLIGRAM(S): at 05:02

## 2019-02-21 RX ADMIN — FENTANYL CITRATE 22.44 MICROGRAM(S)/KG/HR: 50 INJECTION INTRAVENOUS at 08:10

## 2019-02-21 RX ADMIN — ERTAPENEM SODIUM 120 MILLIGRAM(S): 1 INJECTION, POWDER, LYOPHILIZED, FOR SOLUTION INTRAMUSCULAR; INTRAVENOUS at 05:04

## 2019-02-21 RX ADMIN — Medication 40 MILLIGRAM(S): at 21:11

## 2019-02-21 RX ADMIN — Medication 2: at 05:19

## 2019-02-21 RX ADMIN — HUMAN INSULIN 18 UNIT(S): 100 INJECTION, SUSPENSION SUBCUTANEOUS at 00:46

## 2019-02-21 RX ADMIN — HEPARIN SODIUM 7500 UNIT(S): 5000 INJECTION INTRAVENOUS; SUBCUTANEOUS at 05:02

## 2019-02-21 RX ADMIN — SODIUM CHLORIDE 4 MILLILITER(S): 9 INJECTION INTRAMUSCULAR; INTRAVENOUS; SUBCUTANEOUS at 22:53

## 2019-02-21 RX ADMIN — Medication 1 APPLICATION(S): at 17:59

## 2019-02-21 RX ADMIN — CHLORHEXIDINE GLUCONATE 1 APPLICATION(S): 213 SOLUTION TOPICAL at 11:11

## 2019-02-21 RX ADMIN — Medication 20 MILLIGRAM(S): at 21:11

## 2019-02-21 NOTE — CONSULT NOTE ADULT - ASSESSMENT
Conclusion: In this case we recommend tracheostomy meets a standard of bridging the patient to comfort but also meets the standard of “reasonable” interests for a patient who could possibly be weaned and is not actively dying1. The stagnation that is presently occurring can be mediated by involving Ms. Morrow and assuaging the son’s emotional state. The prolonged use of an ET tube warrants consideration in this case as maintain an ETT is not in the patient’s best interest at present .  1.	We recommend continued compassionate conservations with the health care proxy for his grief and psycho-spiritual distress associated with the decision.  2.	It is ethic’s recommendation for Social Work and Ethics to meet with the son today and mediate his emotions regarding this medical decision.     Thank you for involving ethics in this case.     More than 50% of the time of this consultation was spent in coordination of Care of Patient

## 2019-02-21 NOTE — PROGRESS NOTE ADULT - ATTENDING COMMENTS
Patient examined and case reviewed in detail on bedside rounds  Critically ill on vent Failing SBT  Frequent bedside visits with therapy change today.   Crit Care Time Today 35 min+
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
## 2019-02-21 NOTE — CONSULT NOTE ADULT - CONSULT REASON
Assist in an ethical dilemma posed by a 63year old female with advanced lung disease and is intubated and whose appointed health care proxy is struggling with goals of care which includes consent for tracheostomy.

## 2019-02-21 NOTE — PROGRESS NOTE ADULT - SUBJECTIVE AND OBJECTIVE BOX
CHIEF COMPLAINT:    Interval Events:    REVIEW OF SYSTEMS:  Constitutional:   Eyes:  ENT:  CV:  Resp:  GI:  :  MSK:  Integumentary:  Neurological:  Psychiatric:  Endocrine:  Hematologic/Lymphatic:  Allergic/Immunologic:  [ ] All other systems negative  [ ] Unable to assess ROS because ________    OBJECTIVE:  ICU Vital Signs Last 24 Hrs  T(C): 36.8 (21 Feb 2019 04:00), Max: 36.9 (21 Feb 2019 00:00)  T(F): 98.2 (21 Feb 2019 04:00), Max: 98.4 (21 Feb 2019 00:00)  HR: 69 (21 Feb 2019 06:00) (56 - 93)  BP: 84/67 (21 Feb 2019 06:00) (84/67 - 115/78)  BP(mean): 73 (21 Feb 2019 06:00) (68 - 90)  ABP: --  ABP(mean): --  RR: 28 (21 Feb 2019 06:00) (12 - 28)  SpO2: 91% (21 Feb 2019 06:00) (91% - 99%)    Mode: AC/ CMV (Assist Control/ Continuous Mandatory Ventilation), RR (machine): 28, TV (machine): 350, FiO2: 50, PEEP: 16, MAP: 21, PIP: 37    02-20 @ 07:01  -  02-21 @ 07:00  --------------------------------------------------------  IN: 1942.3 mL / OUT: 3085 mL / NET: -1142.7 mL      CAPILLARY BLOOD GLUCOSE      POCT Blood Glucose.: 172 mg/dL (21 Feb 2019 05:17)      PHYSICAL EXAM:  General:   HEENT:   Lymph Nodes:  Neck:   Respiratory:   Cardiovascular:   Abdomen:   Extremities:   Skin:   Neurological:  Psychiatry:    LINES:    HOSPITAL MEDICATIONS:  MEDICATIONS  (STANDING):  chlorhexidine 0.12% Liquid 15 milliLiter(s) Swish and Spit every 12 hours  chlorhexidine 4% Liquid 1 Application(s) Topical <User Schedule>  collagenase Ointment 1 Application(s) Topical daily  dexmedetomidine Infusion 0.2 MICROgram(s)/kG/Hr (5.61 mL/Hr) IV Continuous <Continuous>  dextrose 5%. 1000 milliLiter(s) (50 mL/Hr) IV Continuous <Continuous>  dextrose 50% Injectable 12.5 Gram(s) IV Push once  dextrose 50% Injectable 25 Gram(s) IV Push once  dextrose 50% Injectable 25 Gram(s) IV Push once  ertapenem  IVPB 1000 milliGRAM(s) IV Intermittent every 24 hours  fentaNYL   Infusion. 2 MICROgram(s)/kG/Hr (22.44 mL/Hr) IV Continuous <Continuous>  furosemide   Injectable 40 milliGRAM(s) IV Push every 8 hours  heparin  Injectable 7500 Unit(s) SubCutaneous every 8 hours  influenza   Vaccine 0.5 milliLiter(s) IntraMuscular once  insulin lispro (HumaLOG) corrective regimen sliding scale   SubCutaneous every 6 hours  insulin NPH human recombinant 18 Unit(s) SubCutaneous every 6 hours  levothyroxine Injectable 100 MICROGram(s) IV Push at bedtime  methylPREDNISolone sodium succinate Injectable 10 milliGRAM(s) IV Push daily  metolazone 5 milliGRAM(s) Oral daily  pantoprazole  Injectable 40 milliGRAM(s) IV Push daily  petrolatum Ophthalmic Ointment 1 Application(s) Both EYES every 12 hours  sildenafil (REVATIO) 20 milliGRAM(s) Oral three times a day  simvastatin 20 milliGRAM(s) Oral at bedtime  sodium chloride 3%  Inhalation 4 milliLiter(s) Inhalation every 6 hours    MEDICATIONS  (PRN):  acetaminophen    Suspension .. 650 milliGRAM(s) Oral every 6 hours PRN Temp greater or equal to 38C (100.4F), Mild Pain (1 - 3), Moderate Pain (4 - 6)  dextrose 40% Gel 15 Gram(s) Oral once PRN Blood Glucose LESS THAN 70 milliGRAM(s)/deciliter  glucagon  Injectable 1 milliGRAM(s) IntraMuscular once PRN Glucose LESS THAN 70 milligrams/deciliter  glucagon  Injectable 1 milliGRAM(s) IntraMuscular once PRN Glucose LESS THAN 70 milligrams/deciliter      LABS:                        8.4    12.11 )-----------( 465      ( 21 Feb 2019 02:30 )             28.7     02-21    144  |  98  |  41<H>  ----------------------------<  141<H>  3.7   |  29  |  0.77    Ca    9.3      21 Feb 2019 02:30  Phos  3.5     02-21  Mg     2.1     02-21    TPro  6.4  /  Alb  3.1<L>  /  TBili  0.3  /  DBili  x   /  AST  27  /  ALT  23  /  AlkPhos  168<H>  02-21    PT/INR - ( 20 Feb 2019 05:00 )   PT: 13.0 SEC;   INR: 1.14          PTT - ( 20 Feb 2019 05:00 )  PTT:50.3 SEC          MICROBIOLOGY:     RADIOLOGY:  [ ] Reviewed and interpreted by me    EKG: CHIEF COMPLAINT:    Interval Events:  Appeared comfortable this morning. Currently titrated down from propofol. Able to follow commands and regard.     REVIEW OF SYSTEMS:  [X] Unable to assess ROS because intubated    OBJECTIVE:  ICU Vital Signs Last 24 Hrs  T(C): 36.8 (21 Feb 2019 04:00), Max: 36.9 (21 Feb 2019 00:00)  T(F): 98.2 (21 Feb 2019 04:00), Max: 98.4 (21 Feb 2019 00:00)  HR: 69 (21 Feb 2019 06:00) (56 - 93)  BP: 84/67 (21 Feb 2019 06:00) (84/67 - 115/78)  BP(mean): 73 (21 Feb 2019 06:00) (68 - 90)  ABP: --  ABP(mean): --  RR: 28 (21 Feb 2019 06:00) (12 - 28)  SpO2: 91% (21 Feb 2019 06:00) (91% - 99%)    Mode: AC/ CMV (Assist Control/ Continuous Mandatory Ventilation), RR (machine): 28, TV (machine): 350, FiO2: 50, PEEP: 16, MAP: 21, PIP: 37    02-20 @ 07:01  -  02-21 @ 07:00  --------------------------------------------------------  IN: 1942.3 mL / OUT: 3085 mL / NET: -1142.7 mL      CAPILLARY BLOOD GLUCOSE      POCT Blood Glucose.: 172 mg/dL (21 Feb 2019 05:17)      PHYSICAL EXAM:  General: NAD  HEENT: No scleral icterus  Respiratory: ventilated, comfortable appearing, regular chest rises, clear to auscultation with isolation stethoscope  Cardiovascular: RRR   Abdomen: soft, open with gauze  Skin: open abdomen  Neurological: awake, alert, intubated  Psychiatry: calm    LINES:    HOSPITAL MEDICATIONS:  MEDICATIONS  (STANDING):  chlorhexidine 0.12% Liquid 15 milliLiter(s) Swish and Spit every 12 hours  chlorhexidine 4% Liquid 1 Application(s) Topical <User Schedule>  collagenase Ointment 1 Application(s) Topical daily  dexmedetomidine Infusion 0.2 MICROgram(s)/kG/Hr (5.61 mL/Hr) IV Continuous <Continuous>  dextrose 5%. 1000 milliLiter(s) (50 mL/Hr) IV Continuous <Continuous>  dextrose 50% Injectable 12.5 Gram(s) IV Push once  dextrose 50% Injectable 25 Gram(s) IV Push once  dextrose 50% Injectable 25 Gram(s) IV Push once  ertapenem  IVPB 1000 milliGRAM(s) IV Intermittent every 24 hours  fentaNYL   Infusion. 2 MICROgram(s)/kG/Hr (22.44 mL/Hr) IV Continuous <Continuous>  furosemide   Injectable 40 milliGRAM(s) IV Push every 8 hours  heparin  Injectable 7500 Unit(s) SubCutaneous every 8 hours  influenza   Vaccine 0.5 milliLiter(s) IntraMuscular once  insulin lispro (HumaLOG) corrective regimen sliding scale   SubCutaneous every 6 hours  insulin NPH human recombinant 18 Unit(s) SubCutaneous every 6 hours  levothyroxine Injectable 100 MICROGram(s) IV Push at bedtime  methylPREDNISolone sodium succinate Injectable 10 milliGRAM(s) IV Push daily  metolazone 5 milliGRAM(s) Oral daily  pantoprazole  Injectable 40 milliGRAM(s) IV Push daily  petrolatum Ophthalmic Ointment 1 Application(s) Both EYES every 12 hours  sildenafil (REVATIO) 20 milliGRAM(s) Oral three times a day  simvastatin 20 milliGRAM(s) Oral at bedtime  sodium chloride 3%  Inhalation 4 milliLiter(s) Inhalation every 6 hours    MEDICATIONS  (PRN):  acetaminophen    Suspension .. 650 milliGRAM(s) Oral every 6 hours PRN Temp greater or equal to 38C (100.4F), Mild Pain (1 - 3), Moderate Pain (4 - 6)  dextrose 40% Gel 15 Gram(s) Oral once PRN Blood Glucose LESS THAN 70 milliGRAM(s)/deciliter  glucagon  Injectable 1 milliGRAM(s) IntraMuscular once PRN Glucose LESS THAN 70 milligrams/deciliter  glucagon  Injectable 1 milliGRAM(s) IntraMuscular once PRN Glucose LESS THAN 70 milligrams/deciliter      LABS:                        8.4    12.11 )-----------( 465      ( 21 Feb 2019 02:30 )             28.7     02-21    144  |  98  |  41<H>  ----------------------------<  141<H>  3.7   |  29  |  0.77    Ca    9.3      21 Feb 2019 02:30  Phos  3.5     02-21  Mg     2.1     02-21    TPro  6.4  /  Alb  3.1<L>  /  TBili  0.3  /  DBili  x   /  AST  27  /  ALT  23  /  AlkPhos  168<H>  02-21    PT/INR - ( 20 Feb 2019 05:00 )   PT: 13.0 SEC;   INR: 1.14          PTT - ( 20 Feb 2019 05:00 )  PTT:50.3 SEC          MICROBIOLOGY:     RADIOLOGY:  [ ] Reviewed and interpreted by me    EKG:

## 2019-02-21 NOTE — PROGRESS NOTE ADULT - ASSESSMENT
*In progress*     63 year old female with PMHx of HTN, DM, hypothyroidism, RA (prednisone 10mg), pulmonary HTN, CHRIS, and COPD (3L at home) who was originally admitted on 12/15 for SBO s/p ex lap with lysis of adhesions (4 retention sutures) c/b 12/30 she eviscerated 2/2 a coughing episode s/p ex lap (6 retention sutures) c/b multifocal pneumonia, developed hypercapnic respiratory failure requiring intubation 1/6, s/p bronch 1/9/18, and extubated on 1/24, course further c/b Enterococci bacteremia now readmitted to MICU for hypoxic respiratory failure 2/2 atelectasis requiring intubation 01/31. Overnight had grown ESBL in urine, now on ertapenem and levophed.     #Neuro:   - Intubated/sedated   - Continue propofol and fentanyl, precedex. Wean as tolerated for mental status check.  - Was able to open eyes yesterday as sedation was lightly weaned.    # Skin: L SC line 1/31    #GI  - SBO s/p ex-lap with findings of healthy bowel c/b evisceration of bowel requiring placement of retention sutures  - NPO with tube feeds   - Continue Protonix  - s/w surgery, plan to follow-up patient    #Renal/Metabolic  - Monitor for electrolyte derangements in the setting of diuresis; replete as needed  - trend BMP for BUN/Cr  - Will closely monitor Na with diuresis. Holding further free water flushes given overall net positive in last 24 hours    #Endocrine  1) Diabetes Mellitus Type 2  - Increased to NPH 14 q6; off insulin gtt, decreased from NPH 24 Q6  - Gabapentin 400 TID held for now   - Endocrinology recommendations appreciated    2) Hypothyroidism   - c/w 100 mcg levothyroxine IV     3.) Rheumatoid arthritis:   - Continue solumedrol 10 daily, however monitor FSGs closely for hyperglycemia    # Volume status:   - Overall net negative 1 L in last 24 hours  - Stopping free water flushes, goal net negative 1-2 L daily    # Heme:  - Hemoglobin improved, likely from blood draws and bone marrow suppression  - s/p 1 unit pRBCs (2/20)  - WBC ct elevated today    #DVT ppx  - Heparin 7500 SQ TID    #ID   - Continue ertapenem daily (day 3 of 5)   - Off previous antibiotics as per discussion w/ ID - had 27 days of Vanc, 3 days of Ceftriaxone, and 8 days of Zosyn for enterococcal bacteremia and Klebsiella sputum culture   - Urine cx with ESBL 10-50k colonies and candida, potentially colonized  - C diff negative    #Cardiovascular  1) Previously reported to have cardiogenic shock. Overloaded currently, not requiring inotropes or pressors.  - Scattered B-lines with normal cardiac function on yesterday's POCUS  - Overall net positive 1 L yesterday, consider extra dose of lasix today  - Continue to hold home blood pressure medications  - c/w Simvastatin 20mg   - c/w lasix 80 Q8     #Respiratory   1) Hypoxic Respiratory Failure 2/2 atelectasis requiring intubation 1/31.   - Concern for prolonged intubation and deconditioning. Has had prolonged hospital course with multiple intubations. Previous bronchoscopy w/ findings concerning for hyperdynamic airway collapse.   - C/w sildenafil 20 TID, now off NO.   - Continue solumedrol 10 mg daily   - Will likely need tracheostomy  - Ethics involved in tracheostomy discussion    # Ethics:   - G[facility] discussion ongoing with Palliative Care team. Pending reconsult.   - Will need further discussions with family including son (HCP) and daughter coming in, regarding potential tracheostomy with ethics assistance  - Complicated family dynamic. Family member called police yesterday.     Cathryn Ackerman MD  Internal Medicine, PGY-3  Pager [phone] 63 year old female with PMHx of HTN, DM, hypothyroidism, RA (prednisone 10mg), pulmonary HTN, CHRIS, and COPD (3L at home) who was originally admitted on 12/15 for SBO s/p ex lap with lysis of adhesions (4 retention sutures) c/b 12/30 she eviscerated 2/2 a coughing episode s/p ex lap (6 retention sutures) c/b multifocal pneumonia, developed hypercapnic respiratory failure requiring intubation 1/6, s/p bronch 1/9/18, and extubated on 1/24, course further c/b Enterococci bacteremia now readmitted to MICU for hypoxic respiratory failure 2/2 atelectasis requiring intubation 01/31. Overnight had grown ESBL in urine, now on ertapenem and levophed.     #Neuro:   - Intubated, comfortable appearing, alert, following commands.  - Currnetly on fentanyl and precedex on low doses    # Skin: L SC line 1/31    #GI  - SBO s/p ex-lap with findings of healthy bowel c/b evisceration of bowel requiring placement of retention sutures  - NPO with tube feeds   - Continue Protonix    #Renal/Metabolic  - Monitor for electrolyte derangements in the setting of diuresis; replete as needed  - trend BMP for BUN/Cr  - Will closely monitor Na with diuresis. Holding further free water flushes given overall net positive in last 24 hours    #Endocrine  1) Diabetes Mellitus Type 2  - Increased to NPH 14 q6; off insulin gtt, decreased from NPH 24 Q6  - Gabapentin 400 TID held for now   - Endocrinology recommendations appreciated    2) Hypothyroidism   - c/w 100 mcg levothyroxine IV     3.) Rheumatoid arthritis:   - Continue solumedrol 10 daily, however monitor FSGs closely for hyperglycemia    # Volume status:   - Overall net negative 1 L in last 24 hours  - Stopping free water flushes, goal net negative 1-2 L daily    # Heme:  - Hemoglobin improved, likely from blood draws and bone marrow suppression  - s/p 1 unit pRBCs (2/20)  - WBC ct elevated today    #DVT ppx  - Heparin 7500 SQ TID    #ID   - Continue ertapenem daily (day 3 of 5)   - Off previous antibiotics as per discussion w/ ID - had 27 days of Vanc, 3 days of Ceftriaxone, and 8 days of Zosyn for enterococcal bacteremia and Klebsiella sputum culture   - Urine cx with ESBL 10-50k colonies and candida, potentially colonized  - C diff negative    #Cardiovascular  1) Previously reported to have cardiogenic shock. Overloaded currently, not requiring inotropes or pressors.  - Scattered B-lines with normal cardiac function on yesterday's POCUS  - Overall net positive 1 L yesterday, consider extra dose of lasix today  - Continue to hold home blood pressure medications  - c/w Simvastatin 20mg   - c/w lasix 80 Q8     #Respiratory   1) Hypoxic Respiratory Failure 2/2 atelectasis requiring intubation 1/31.   - Concern for prolonged intubation and deconditioning. Has had prolonged hospital course with multiple intubations. Previous bronchoscopy w/ findings concerning for hyperdynamic airway collapse.   - C/w sildenafil 20 TID, now off NO.   - Continue solumedrol 10 mg daily   - Will likely need tracheostomy  - Ethics involved in tracheostomy discussion    # Ethics:   - [facility] discussion ongoing with Palliative Care team. Pending reconsult.   - Will need further discussions with family including son (HCP) and daughter coming in, regarding potential tracheostomy with ethics assistance  - Complicated family dynamic. Family member called police yesterday.     Cathryn Ackerman MD  Internal Medicine, PGY-3  Pager [phone]

## 2019-02-22 LAB
ALBUMIN SERPL ELPH-MCNC: 3.5 G/DL — SIGNIFICANT CHANGE UP (ref 3.3–5)
ALP SERPL-CCNC: 180 U/L — HIGH (ref 40–120)
ALT FLD-CCNC: 27 U/L — SIGNIFICANT CHANGE UP (ref 4–33)
ANION GAP SERPL CALC-SCNC: 19 MMO/L — HIGH (ref 7–14)
APPEARANCE UR: CLEAR — SIGNIFICANT CHANGE UP
AST SERPL-CCNC: 31 U/L — SIGNIFICANT CHANGE UP (ref 4–32)
BACTERIA # UR AUTO: NEGATIVE — SIGNIFICANT CHANGE UP
BASOPHILS # BLD AUTO: 0.15 K/UL — SIGNIFICANT CHANGE UP (ref 0–0.2)
BASOPHILS NFR BLD AUTO: 1 % — SIGNIFICANT CHANGE UP (ref 0–2)
BILIRUB SERPL-MCNC: 0.4 MG/DL — SIGNIFICANT CHANGE UP (ref 0.2–1.2)
BILIRUB UR-MCNC: NEGATIVE — SIGNIFICANT CHANGE UP
BLOOD UR QL VISUAL: SIGNIFICANT CHANGE UP
BUN SERPL-MCNC: 39 MG/DL — HIGH (ref 7–23)
CALCIUM SERPL-MCNC: 10.2 MG/DL — SIGNIFICANT CHANGE UP (ref 8.4–10.5)
CHLORIDE SERPL-SCNC: 93 MMOL/L — LOW (ref 98–107)
CO2 SERPL-SCNC: 32 MMOL/L — HIGH (ref 22–31)
COLOR SPEC: SIGNIFICANT CHANGE UP
CREAT SERPL-MCNC: 0.92 MG/DL — SIGNIFICANT CHANGE UP (ref 0.5–1.3)
EOSINOPHIL # BLD AUTO: 0.61 K/UL — HIGH (ref 0–0.5)
EOSINOPHIL NFR BLD AUTO: 4 % — SIGNIFICANT CHANGE UP (ref 0–6)
GLUCOSE BLDC GLUCOMTR-MCNC: 130 MG/DL — HIGH (ref 70–99)
GLUCOSE BLDC GLUCOMTR-MCNC: 146 MG/DL — HIGH (ref 70–99)
GLUCOSE BLDC GLUCOMTR-MCNC: 321 MG/DL — HIGH (ref 70–99)
GLUCOSE SERPL-MCNC: 114 MG/DL — HIGH (ref 70–99)
GLUCOSE UR-MCNC: NEGATIVE — SIGNIFICANT CHANGE UP
GRAM STN SPT: SIGNIFICANT CHANGE UP
HCT VFR BLD CALC: 31.5 % — LOW (ref 34.5–45)
HGB BLD-MCNC: 9.2 G/DL — LOW (ref 11.5–15.5)
HYALINE CASTS # UR AUTO: SIGNIFICANT CHANGE UP
IMM GRANULOCYTES NFR BLD AUTO: 2.4 % — HIGH (ref 0–1.5)
KETONES UR-MCNC: SIGNIFICANT CHANGE UP
LEUKOCYTE ESTERASE UR-ACNC: NEGATIVE — SIGNIFICANT CHANGE UP
LYMPHOCYTES # BLD AUTO: 19.7 % — SIGNIFICANT CHANGE UP (ref 13–44)
LYMPHOCYTES # BLD AUTO: 3 K/UL — SIGNIFICANT CHANGE UP (ref 1–3.3)
MAGNESIUM SERPL-MCNC: 2.1 MG/DL — SIGNIFICANT CHANGE UP (ref 1.6–2.6)
MCHC RBC-ENTMCNC: 28.9 PG — SIGNIFICANT CHANGE UP (ref 27–34)
MCHC RBC-ENTMCNC: 29.2 % — LOW (ref 32–36)
MCV RBC AUTO: 99.1 FL — SIGNIFICANT CHANGE UP (ref 80–100)
MONOCYTES # BLD AUTO: 1.49 K/UL — HIGH (ref 0–0.9)
MONOCYTES NFR BLD AUTO: 9.8 % — SIGNIFICANT CHANGE UP (ref 2–14)
NEUTROPHILS # BLD AUTO: 9.62 K/UL — HIGH (ref 1.8–7.4)
NEUTROPHILS NFR BLD AUTO: 63.1 % — SIGNIFICANT CHANGE UP (ref 43–77)
NITRITE UR-MCNC: NEGATIVE — SIGNIFICANT CHANGE UP
NRBC # FLD: 0.42 K/UL — LOW (ref 25–125)
NRBC FLD-RTO: 2.8 — SIGNIFICANT CHANGE UP
PH UR: 7.5 — SIGNIFICANT CHANGE UP (ref 5–8)
PHOSPHATE SERPL-MCNC: 5 MG/DL — HIGH (ref 2.5–4.5)
PLATELET # BLD AUTO: 485 K/UL — HIGH (ref 150–400)
PMV BLD: 9.8 FL — SIGNIFICANT CHANGE UP (ref 7–13)
POTASSIUM SERPL-MCNC: 3.5 MMOL/L — SIGNIFICANT CHANGE UP (ref 3.5–5.3)
POTASSIUM SERPL-SCNC: 3.5 MMOL/L — SIGNIFICANT CHANGE UP (ref 3.5–5.3)
PROT SERPL-MCNC: 7.2 G/DL — SIGNIFICANT CHANGE UP (ref 6–8.3)
PROT UR-MCNC: 20 — SIGNIFICANT CHANGE UP
RBC # BLD: 3.18 M/UL — LOW (ref 3.8–5.2)
RBC # FLD: 22.1 % — HIGH (ref 10.3–14.5)
RBC CASTS # UR COMP ASSIST: HIGH (ref 0–?)
SODIUM SERPL-SCNC: 144 MMOL/L — SIGNIFICANT CHANGE UP (ref 135–145)
SP GR SPEC: 1.01 — SIGNIFICANT CHANGE UP (ref 1–1.04)
SPECIMEN SOURCE: SIGNIFICANT CHANGE UP
SQUAMOUS # UR AUTO: SIGNIFICANT CHANGE UP
UROBILINOGEN FLD QL: NORMAL — SIGNIFICANT CHANGE UP
WBC # BLD: 15.23 K/UL — HIGH (ref 3.8–10.5)
WBC # FLD AUTO: 15.23 K/UL — HIGH (ref 3.8–10.5)
WBC UR QL: SIGNIFICANT CHANGE UP (ref 0–?)
YEAST FLD HPF-#/AREA: SIGNIFICANT CHANGE UP

## 2019-02-22 PROCEDURE — 99291 CRITICAL CARE FIRST HOUR: CPT

## 2019-02-22 RX ORDER — POTASSIUM CHLORIDE 20 MEQ
40 PACKET (EA) ORAL ONCE
Qty: 0 | Refills: 0 | Status: COMPLETED | OUTPATIENT
Start: 2019-02-22 | End: 2019-02-22

## 2019-02-22 RX ADMIN — HEPARIN SODIUM 7500 UNIT(S): 5000 INJECTION INTRAVENOUS; SUBCUTANEOUS at 21:50

## 2019-02-22 RX ADMIN — HUMAN INSULIN 18 UNIT(S): 100 INJECTION, SUSPENSION SUBCUTANEOUS at 18:13

## 2019-02-22 RX ADMIN — Medication 10 MILLIGRAM(S): at 06:16

## 2019-02-22 RX ADMIN — Medication 1 APPLICATION(S): at 18:16

## 2019-02-22 RX ADMIN — Medication 40 MILLIGRAM(S): at 13:17

## 2019-02-22 RX ADMIN — Medication 40 MILLIEQUIVALENT(S): at 06:13

## 2019-02-22 RX ADMIN — Medication 100 MICROGRAM(S): at 22:35

## 2019-02-22 RX ADMIN — FENTANYL CITRATE 22.44 MICROGRAM(S)/KG/HR: 50 INJECTION INTRAVENOUS at 09:11

## 2019-02-22 RX ADMIN — Medication 40 MILLIGRAM(S): at 21:51

## 2019-02-22 RX ADMIN — Medication 1 APPLICATION(S): at 12:48

## 2019-02-22 RX ADMIN — Medication 20 MILLIGRAM(S): at 06:13

## 2019-02-22 RX ADMIN — Medication 40 MILLIGRAM(S): at 06:14

## 2019-02-22 RX ADMIN — SIMVASTATIN 20 MILLIGRAM(S): 20 TABLET, FILM COATED ORAL at 22:35

## 2019-02-22 RX ADMIN — SODIUM CHLORIDE 4 MILLILITER(S): 9 INJECTION INTRAMUSCULAR; INTRAVENOUS; SUBCUTANEOUS at 17:21

## 2019-02-22 RX ADMIN — CHLORHEXIDINE GLUCONATE 1 APPLICATION(S): 213 SOLUTION TOPICAL at 12:48

## 2019-02-22 RX ADMIN — ERTAPENEM SODIUM 120 MILLIGRAM(S): 1 INJECTION, POWDER, LYOPHILIZED, FOR SOLUTION INTRAMUSCULAR; INTRAVENOUS at 05:47

## 2019-02-22 RX ADMIN — DEXMEDETOMIDINE HYDROCHLORIDE IN 0.9% SODIUM CHLORIDE 5.61 MICROGRAM(S)/KG/HR: 4 INJECTION INTRAVENOUS at 09:10

## 2019-02-22 RX ADMIN — SODIUM CHLORIDE 4 MILLILITER(S): 9 INJECTION INTRAMUSCULAR; INTRAVENOUS; SUBCUTANEOUS at 03:28

## 2019-02-22 RX ADMIN — Medication 8: at 13:17

## 2019-02-22 RX ADMIN — PANTOPRAZOLE SODIUM 40 MILLIGRAM(S): 20 TABLET, DELAYED RELEASE ORAL at 12:47

## 2019-02-22 RX ADMIN — Medication 20 MILLIGRAM(S): at 13:18

## 2019-02-22 RX ADMIN — SODIUM CHLORIDE 4 MILLILITER(S): 9 INJECTION INTRAMUSCULAR; INTRAVENOUS; SUBCUTANEOUS at 10:50

## 2019-02-22 RX ADMIN — HEPARIN SODIUM 7500 UNIT(S): 5000 INJECTION INTRAVENOUS; SUBCUTANEOUS at 13:18

## 2019-02-22 RX ADMIN — SODIUM CHLORIDE 4 MILLILITER(S): 9 INJECTION INTRAMUSCULAR; INTRAVENOUS; SUBCUTANEOUS at 21:58

## 2019-02-22 RX ADMIN — CHLORHEXIDINE GLUCONATE 15 MILLILITER(S): 213 SOLUTION TOPICAL at 18:13

## 2019-02-22 RX ADMIN — HUMAN INSULIN 18 UNIT(S): 100 INJECTION, SUSPENSION SUBCUTANEOUS at 00:08

## 2019-02-22 RX ADMIN — HEPARIN SODIUM 7500 UNIT(S): 5000 INJECTION INTRAVENOUS; SUBCUTANEOUS at 06:43

## 2019-02-22 RX ADMIN — HUMAN INSULIN 18 UNIT(S): 100 INJECTION, SUSPENSION SUBCUTANEOUS at 13:17

## 2019-02-22 RX ADMIN — CHLORHEXIDINE GLUCONATE 15 MILLILITER(S): 213 SOLUTION TOPICAL at 06:13

## 2019-02-22 RX ADMIN — HUMAN INSULIN 18 UNIT(S): 100 INJECTION, SUSPENSION SUBCUTANEOUS at 06:15

## 2019-02-22 RX ADMIN — Medication 1 APPLICATION(S): at 06:14

## 2019-02-22 RX ADMIN — Medication 20 MILLIGRAM(S): at 21:49

## 2019-02-22 NOTE — PROGRESS NOTE ADULT - ASSESSMENT
63 year old female with PMHx of HTN, DM, hypothyroidism, RA (prednisone 10mg), pulmonary HTN, CHRIS, and COPD (3L at home) who was originally admitted on 12/15 for SBO s/p ex lap with lysis of adhesions (4 retention sutures) c/b 12/30 she eviscerated 2/2 a coughing episode s/p ex lap (6 retention sutures) c/b multifocal pneumonia, developed hypercapnic respiratory failure requiring intubation 1/6, s/p bronch 1/9/18, and extubated on 1/24, course further c/b Enterococci bacteremia now readmitted to MICU for hypoxic respiratory failure 2/2 atelectasis requiring intubation 01/31. Overnight had grown ESBL in urine, now on ertapenem and levophed.     #Neuro:   - Intubated, comfortable appearing, alert, following commands.  - Currnetly on fentanyl and precedex on low doses    # Skin: L SC line 1/31    #GI  - SBO s/p ex-lap with findings of healthy bowel c/b evisceration of bowel requiring placement of retention sutures  - NPO with tube feeds   - Continue Protonix    #Renal/Metabolic  - Monitor for electrolyte derangements in the setting of diuresis; replete as needed  - trend BMP for BUN/Cr  - Will closely monitor Na with diuresis. Holding further free water flushes given overall net positive in last 24 hours    #Endocrine  1) Diabetes Mellitus Type 2  - Increased to NPH 14 q6; off insulin gtt, decreased from NPH 24 Q6  - Gabapentin 400 TID held for now   - Endocrinology recommendations appreciated    2) Hypothyroidism   - c/w 100 mcg levothyroxine IV     3.) Rheumatoid arthritis:   - Continue solumedrol 10 daily, however monitor FSGs closely for hyperglycemia    # Volume status:   - Overall net negative 1 L in last 24 hours  - Stopping free water flushes, goal net negative 1-2 L daily    # Heme:  - Hemoglobin improved, likely from blood draws and bone marrow suppression  - s/p 1 unit pRBCs (2/20)  - WBC ct elevated today    #DVT ppx  - Heparin 7500 SQ TID    #ID   - Continue ertapenem daily (day 3 of 5)   - Off previous antibiotics as per discussion w/ ID - had 27 days of Vanc, 3 days of Ceftriaxone, and 8 days of Zosyn for enterococcal bacteremia and Klebsiella sputum culture   - Urine cx with ESBL 10-50k colonies and candida, potentially colonized  - C diff negative    #Cardiovascular  1) Previously reported to have cardiogenic shock. Overloaded currently, not requiring inotropes or pressors.  - Overall net negative 2.4 L in last 24 hours   - Continue to hold home blood pressure medications  - c/w Simvastatin 20mg   - c/w lasix 80 Q8     #Respiratory   1) Hypoxic Respiratory Failure 2/2 atelectasis requiring intubation 1/31.   - Concern for prolonged intubation and deconditioning. Has had prolonged hospital course with multiple intubations. Previous bronchoscopy w/ findings concerning for hyperdynamic airway collapse.   - C/w sildenafil 20 TID, now off NO.   - Continue solumedrol 10 mg daily   - Will likely need tracheostomy  - Ethics involved in tracheostomy discussion    # Ethics:   - C discussion ongoing with Palliative Care team. Pending reconsult.   - Will need further discussions with family including son (HCP) and daughter coming in, regarding potential tracheostomy with ethics assistance  - Complicated family dynamic. Family member called police yesterday.     Cathryn Ackerman MD  Internal Medicine, PGY-3  Pager [phone] 63 year old female with PMHx of HTN, DM, hypothyroidism, RA (prednisone 10mg), pulmonary HTN, CHRIS, and COPD (3L at home) who was originally admitted on 12/15 for SBO s/p ex lap with lysis of adhesions (4 retention sutures) c/b 12/30 she eviscerated 2/2 a coughing episode s/p ex lap (6 retention sutures) c/b multifocal pneumonia, developed hypercapnic respiratory failure requiring intubation 1/6, s/p bronch 1/9/18, and extubated on 1/24, course further c/b Enterococci bacteremia now readmitted to MICU for hypoxic respiratory failure 2/2 atelectasis requiring intubation 01/31. Overnight had grown ESBL in urine, now on ertapenem and levophed.     #Neuro:   - Intubated, comfortable appearing, alert, following commands.  - Currently on fentanyl and precedex on low doses    # Skin: L SC line 1/31    #GI  - SBO s/p ex-lap with findings of healthy bowel c/b evisceration of bowel requiring placement of retention sutures  - NPO with tube feeds   - Continue Protonix    #Renal/Metabolic  - Monitor for electrolyte derangements in the setting of diuresis; replete as needed  - trend BMP for BUN/Cr  - Will closely monitor Na with diuresis. Holding further free water flushes given overall net positive in last 24 hours    #Endocrine  1) Diabetes Mellitus Type 2  - Increased to NPH 14 q6; off insulin gtt, decreased from NPH 24 Q6  - Gabapentin 400 TID held for now   - Endocrinology recommendations appreciated    2) Hypothyroidism   - c/w 100 mcg levothyroxine IV     3.) Rheumatoid arthritis:   - Continue solumedrol 10 daily, however monitor FSGs closely for hyperglycemia    # Volume status:   - Overall net negative 2.4 L in last 24 hours  - Stopping free water flushes, goal net negative 1-2 L daily  - Continue lasix and metolazone     # Heme:  - Hemoglobin improved, likely from blood draws and bone marrow suppression  - s/p 1 unit pRBCs (2/20)  - WBC ct uptrending today    #DVT ppx  - Heparin 7500 SQ TID    #ID   - Continue ertapenem daily (day 4 of 10), increasing duration due to concern of low grade fever and increasing white count   - Off previous antibiotics as per discussion w/ ID - had 27 days of Vanc, 3 days of Ceftriaxone, and 8 days of Zosyn for enterococcal bacteremia and Klebsiella sputum culture   - Urine cx with ESBL 10-50k colonies and candida, potentially colonized  - C diff negative    #Cardiovascular  1) Previously reported to have cardiogenic shock. Overloaded currently, not requiring inotropes or pressors.  - Overall net negative 2.4 L in last 24 hours   - Continue to hold home blood pressure medications  - c/w Simvastatin 20mg   - c/w lasix 80 Q8 with metolazone daily    #Respiratory   1) Hypoxic Respiratory Failure 2/2 atelectasis requiring intubation 1/31.   - Concern for prolonged intubation and deconditioning. Has had prolonged hospital course with multiple intubations. Previous bronchoscopy w/ findings concerning for hyperdynamic airway collapse.   - C/w sildenafil 20 TID, now off NO.   - Continue solumedrol 10 mg daily   - Will likely need tracheostomy  - Ethics involved in tracheostomy discussion    # Ethics:   - G[facility] discussion ongoing with Palliative Care team. Pending reconsult.   - Will need further discussions with family including son (HCP) and daughter coming in, regarding potential tracheostomy with ethics assistance  - Complicated family dynamic.   - Family requesting transfer to [facility] for [facility] opinion. Curtis Pisano plans to obtain accepting physician. Once accepting physician is obtained, primary team will need to call transfer center per policy.   - Curtis Pisano requests that no information be given to patient without himself present.      Cathryn Ackerman MD  Internal Medicine, PGY-3  Pager [phone]

## 2019-02-22 NOTE — PROGRESS NOTE ADULT - ATTENDING COMMENTS
Prolonged respiratory failure, unable to extubate given hypoxemia, poor mental status, and severe critical illness polyneuropathy. Remains on 50%, PEEP decreased from 16 to 12. Continue lung protective ventilation. Remains HD stable. Tolerating tube feeds. Stable kidney function and lytes. Continue diuresis. Continue ertapenem for ESBL E.coli UTI. Currently with fevers, unclear etiology. Follow-up cultures. I discussed with son Norris, who wishes to transfer patient to [facility]. I informed him that for patient-requested transfers, he must find an accepting physician, then our team would call to discuss regarding the potential transfer.

## 2019-02-22 NOTE — PROGRESS NOTE ADULT - SUBJECTIVE AND OBJECTIVE BOX
CHIEF COMPLAINT:    Interval Events:    REVIEW OF SYSTEMS:  Constitutional:   Eyes:  ENT:  CV:  Resp:  GI:  :  MSK:  Integumentary:  Neurological:  Psychiatric:  Endocrine:  Hematologic/Lymphatic:  Allergic/Immunologic:  [ ] All other systems negative  [ ] Unable to assess ROS because ________    OBJECTIVE:  ICU Vital Signs Last 24 Hrs  T(C): 37.7 (22 Feb 2019 04:00), Max: 38.1 (22 Feb 2019 00:00)  T(F): 99.9 (22 Feb 2019 04:00), Max: 100.5 (22 Feb 2019 00:00)  HR: 106 (22 Feb 2019 06:00) (71 - 118)  BP: 100/69 (22 Feb 2019 06:00) (74/64 - 134/83)  BP(mean): 79 (22 Feb 2019 06:00) (66 - 108)  ABP: --  ABP(mean): --  RR: 22 (22 Feb 2019 06:00) (12 - 29)  SpO2: 95% (22 Feb 2019 06:00) (90% - 100%)    Mode: AC/ CMV (Assist Control/ Continuous Mandatory Ventilation), RR (machine): 28, TV (machine): 350, FiO2: 50, PEEP: 16, MAP: 20, PIP: 36    02-21 @ 07:01  -  02-22 @ 07:00  --------------------------------------------------------  IN: 1222.4 mL / OUT: 3635 mL / NET: -2412.6 mL      CAPILLARY BLOOD GLUCOSE      POCT Blood Glucose.: 130 mg/dL (22 Feb 2019 05:44)      PHYSICAL EXAM:  General:   HEENT:   Lymph Nodes:  Neck:   Respiratory:   Cardiovascular:   Abdomen:   Extremities:   Skin:   Neurological:  Psychiatry:    LINES:    HOSPITAL MEDICATIONS:  MEDICATIONS  (STANDING):  chlorhexidine 0.12% Liquid 15 milliLiter(s) Swish and Spit every 12 hours  chlorhexidine 4% Liquid 1 Application(s) Topical <User Schedule>  collagenase Ointment 1 Application(s) Topical daily  dexmedetomidine Infusion 0.2 MICROgram(s)/kG/Hr (5.61 mL/Hr) IV Continuous <Continuous>  dextrose 5%. 1000 milliLiter(s) (50 mL/Hr) IV Continuous <Continuous>  dextrose 50% Injectable 12.5 Gram(s) IV Push once  dextrose 50% Injectable 25 Gram(s) IV Push once  dextrose 50% Injectable 25 Gram(s) IV Push once  ertapenem  IVPB 1000 milliGRAM(s) IV Intermittent every 24 hours  fentaNYL   Infusion. 2 MICROgram(s)/kG/Hr (22.44 mL/Hr) IV Continuous <Continuous>  furosemide   Injectable 40 milliGRAM(s) IV Push every 8 hours  heparin  Injectable 7500 Unit(s) SubCutaneous every 8 hours  influenza   Vaccine 0.5 milliLiter(s) IntraMuscular once  insulin lispro (HumaLOG) corrective regimen sliding scale   SubCutaneous every 6 hours  insulin NPH human recombinant 18 Unit(s) SubCutaneous every 6 hours  levothyroxine Injectable 100 MICROGram(s) IV Push at bedtime  methylPREDNISolone sodium succinate Injectable 10 milliGRAM(s) IV Push daily  metolazone 5 milliGRAM(s) Oral daily  pantoprazole  Injectable 40 milliGRAM(s) IV Push daily  petrolatum Ophthalmic Ointment 1 Application(s) Both EYES every 12 hours  sildenafil (REVATIO) 20 milliGRAM(s) Oral three times a day  simvastatin 20 milliGRAM(s) Oral at bedtime  sodium chloride 3%  Inhalation 4 milliLiter(s) Inhalation every 6 hours    MEDICATIONS  (PRN):  acetaminophen    Suspension .. 650 milliGRAM(s) Oral every 6 hours PRN Temp greater or equal to 38C (100.4F), Mild Pain (1 - 3), Moderate Pain (4 - 6)  dextrose 40% Gel 15 Gram(s) Oral once PRN Blood Glucose LESS THAN 70 milliGRAM(s)/deciliter  glucagon  Injectable 1 milliGRAM(s) IntraMuscular once PRN Glucose LESS THAN 70 milligrams/deciliter  glucagon  Injectable 1 milliGRAM(s) IntraMuscular once PRN Glucose LESS THAN 70 milligrams/deciliter      LABS:                        9.2    15.23 )-----------( 485      ( 22 Feb 2019 04:30 )             31.5     02-22    144  |  93<L>  |  39<H>  ----------------------------<  114<H>  3.5   |  32<H>  |  0.92    Ca    10.2      22 Feb 2019 04:30  Phos  5.0     02-22  Mg     2.1     02-22    TPro  7.2  /  Alb  3.5  /  TBili  0.4  /  DBili  x   /  AST  31  /  ALT  27  /  AlkPhos  180<H>  02-22              MICROBIOLOGY:     RADIOLOGY:  [ ] Reviewed and interpreted by me    EKG: CHIEF COMPLAINT:    Interval Events: Had low grade fever, awake this morning. In no acute physical distress, but appeared sad.     REVIEW OF SYSTEMS:  [x] Unable to assess ROS because intubated    OBJECTIVE:  ICU Vital Signs Last 24 Hrs  T(C): 37.7 (22 Feb 2019 04:00), Max: 38.1 (22 Feb 2019 00:00)  T(F): 99.9 (22 Feb 2019 04:00), Max: 100.5 (22 Feb 2019 00:00)  HR: 106 (22 Feb 2019 06:00) (71 - 118)  BP: 100/69 (22 Feb 2019 06:00) (74/64 - 134/83)  BP(mean): 79 (22 Feb 2019 06:00) (66 - 108)  ABP: --  ABP(mean): --  RR: 22 (22 Feb 2019 06:00) (12 - 29)  SpO2: 95% (22 Feb 2019 06:00) (90% - 100%)    Mode: AC/ CMV (Assist Control/ Continuous Mandatory Ventilation), RR (machine): 28, TV (machine): 350, FiO2: 50, PEEP: 16, MAP: 20, PIP: 36    02-21 @ 07:01  -  02-22 @ 07:00  --------------------------------------------------------  IN: 1222.4 mL / OUT: 3635 mL / NET: -2412.6 mL      CAPILLARY BLOOD GLUCOSE      POCT Blood Glucose.: 130 mg/dL (22 Feb 2019 05:44)      PHYSICAL EXAM:  General: NAD  HEENT: No scleral icterus   Respiratory: CTAB, chest rise with ventilator, not overbreathing  Cardiovascular: RRR  Abdomen: soft, clean gauze, no tenderness or rigidity  Extremities: no swelling  Skin: c/d/i gauze  Neurological: awake, able to follow commands, generalized weakness  Psychiatry: calm, sad    LINES:    HOSPITAL MEDICATIONS:  MEDICATIONS  (STANDING):  chlorhexidine 0.12% Liquid 15 milliLiter(s) Swish and Spit every 12 hours  chlorhexidine 4% Liquid 1 Application(s) Topical <User Schedule>  collagenase Ointment 1 Application(s) Topical daily  dexmedetomidine Infusion 0.2 MICROgram(s)/kG/Hr (5.61 mL/Hr) IV Continuous <Continuous>  dextrose 5%. 1000 milliLiter(s) (50 mL/Hr) IV Continuous <Continuous>  dextrose 50% Injectable 12.5 Gram(s) IV Push once  dextrose 50% Injectable 25 Gram(s) IV Push once  dextrose 50% Injectable 25 Gram(s) IV Push once  ertapenem  IVPB 1000 milliGRAM(s) IV Intermittent every 24 hours  fentaNYL   Infusion. 2 MICROgram(s)/kG/Hr (22.44 mL/Hr) IV Continuous <Continuous>  furosemide   Injectable 40 milliGRAM(s) IV Push every 8 hours  heparin  Injectable 7500 Unit(s) SubCutaneous every 8 hours  influenza   Vaccine 0.5 milliLiter(s) IntraMuscular once  insulin lispro (HumaLOG) corrective regimen sliding scale   SubCutaneous every 6 hours  insulin NPH human recombinant 18 Unit(s) SubCutaneous every 6 hours  levothyroxine Injectable 100 MICROGram(s) IV Push at bedtime  methylPREDNISolone sodium succinate Injectable 10 milliGRAM(s) IV Push daily  metolazone 5 milliGRAM(s) Oral daily  pantoprazole  Injectable 40 milliGRAM(s) IV Push daily  petrolatum Ophthalmic Ointment 1 Application(s) Both EYES every 12 hours  sildenafil (REVATIO) 20 milliGRAM(s) Oral three times a day  simvastatin 20 milliGRAM(s) Oral at bedtime  sodium chloride 3%  Inhalation 4 milliLiter(s) Inhalation every 6 hours    MEDICATIONS  (PRN):  acetaminophen    Suspension .. 650 milliGRAM(s) Oral every 6 hours PRN Temp greater or equal to 38C (100.4F), Mild Pain (1 - 3), Moderate Pain (4 - 6)  dextrose 40% Gel 15 Gram(s) Oral once PRN Blood Glucose LESS THAN 70 milliGRAM(s)/deciliter  glucagon  Injectable 1 milliGRAM(s) IntraMuscular once PRN Glucose LESS THAN 70 milligrams/deciliter  glucagon  Injectable 1 milliGRAM(s) IntraMuscular once PRN Glucose LESS THAN 70 milligrams/deciliter      LABS:                        9.2    15.23 )-----------( 485      ( 22 Feb 2019 04:30 )             31.5     02-22    144  |  93<L>  |  39<H>  ----------------------------<  114<H>  3.5   |  32<H>  |  0.92    Ca    10.2      22 Feb 2019 04:30  Phos  5.0     02-22  Mg     2.1     02-22    TPro  7.2  /  Alb  3.5  /  TBili  0.4  /  DBili  x   /  AST  31  /  ALT  27  /  AlkPhos  180<H>  02-22              MICROBIOLOGY:     RADIOLOGY:  [ ] Reviewed and interpreted by me    EKG:

## 2019-02-23 LAB
ALBUMIN SERPL ELPH-MCNC: 3.2 G/DL — LOW (ref 3.3–5)
ALBUMIN SERPL ELPH-MCNC: 3.3 G/DL — SIGNIFICANT CHANGE UP (ref 3.3–5)
ALP SERPL-CCNC: 143 U/L — HIGH (ref 40–120)
ALP SERPL-CCNC: 161 U/L — HIGH (ref 40–120)
ALT FLD-CCNC: 23 U/L — SIGNIFICANT CHANGE UP (ref 4–33)
ALT FLD-CCNC: 25 U/L — SIGNIFICANT CHANGE UP (ref 4–33)
ANION GAP SERPL CALC-SCNC: 19 MMO/L — HIGH (ref 7–14)
ANION GAP SERPL CALC-SCNC: 25 MMO/L — HIGH (ref 7–14)
AST SERPL-CCNC: 29 U/L — SIGNIFICANT CHANGE UP (ref 4–32)
AST SERPL-CCNC: 30 U/L — SIGNIFICANT CHANGE UP (ref 4–32)
BASE EXCESS BLDA CALC-SCNC: 8.9 MMOL/L — SIGNIFICANT CHANGE UP
BASOPHILS # BLD AUTO: 0.1 K/UL — SIGNIFICANT CHANGE UP (ref 0–0.2)
BASOPHILS # BLD AUTO: 0.12 K/UL — SIGNIFICANT CHANGE UP (ref 0–0.2)
BASOPHILS NFR BLD AUTO: 0.8 % — SIGNIFICANT CHANGE UP (ref 0–2)
BASOPHILS NFR BLD AUTO: 0.9 % — SIGNIFICANT CHANGE UP (ref 0–2)
BILIRUB SERPL-MCNC: 0.4 MG/DL — SIGNIFICANT CHANGE UP (ref 0.2–1.2)
BILIRUB SERPL-MCNC: 0.5 MG/DL — SIGNIFICANT CHANGE UP (ref 0.2–1.2)
BUN SERPL-MCNC: 45 MG/DL — HIGH (ref 7–23)
BUN SERPL-MCNC: 50 MG/DL — HIGH (ref 7–23)
CALCIUM SERPL-MCNC: 10.3 MG/DL — SIGNIFICANT CHANGE UP (ref 8.4–10.5)
CALCIUM SERPL-MCNC: 9.8 MG/DL — SIGNIFICANT CHANGE UP (ref 8.4–10.5)
CHLORIDE BLDA-SCNC: 97 MMOL/L — SIGNIFICANT CHANGE UP (ref 96–108)
CHLORIDE SERPL-SCNC: 91 MMOL/L — LOW (ref 98–107)
CHLORIDE SERPL-SCNC: 93 MMOL/L — LOW (ref 98–107)
CO2 SERPL-SCNC: 28 MMOL/L — SIGNIFICANT CHANGE UP (ref 22–31)
CO2 SERPL-SCNC: 30 MMOL/L — SIGNIFICANT CHANGE UP (ref 22–31)
CREAT SERPL-MCNC: 1.02 MG/DL — SIGNIFICANT CHANGE UP (ref 0.5–1.3)
CREAT SERPL-MCNC: 1.3 MG/DL — SIGNIFICANT CHANGE UP (ref 0.5–1.3)
EOSINOPHIL # BLD AUTO: 0.28 K/UL — SIGNIFICANT CHANGE UP (ref 0–0.5)
EOSINOPHIL # BLD AUTO: 0.4 K/UL — SIGNIFICANT CHANGE UP (ref 0–0.5)
EOSINOPHIL NFR BLD AUTO: 2.3 % — SIGNIFICANT CHANGE UP (ref 0–6)
EOSINOPHIL NFR BLD AUTO: 2.8 % — SIGNIFICANT CHANGE UP (ref 0–6)
GLUCOSE BLDA-MCNC: 204 MG/DL — HIGH (ref 70–99)
GLUCOSE BLDC GLUCOMTR-MCNC: 137 MG/DL — HIGH (ref 70–99)
GLUCOSE BLDC GLUCOMTR-MCNC: 163 MG/DL — HIGH (ref 70–99)
GLUCOSE BLDC GLUCOMTR-MCNC: 180 MG/DL — HIGH (ref 70–99)
GLUCOSE BLDC GLUCOMTR-MCNC: 313 MG/DL — HIGH (ref 70–99)
GLUCOSE SERPL-MCNC: 150 MG/DL — HIGH (ref 70–99)
GLUCOSE SERPL-MCNC: 151 MG/DL — HIGH (ref 70–99)
HCO3 BLDA-SCNC: 33 MMOL/L — HIGH (ref 22–26)
HCT VFR BLD CALC: 30.1 % — LOW (ref 34.5–45)
HCT VFR BLD CALC: 31.1 % — LOW (ref 34.5–45)
HCT VFR BLDA CALC: 29 % — LOW (ref 34.5–46.5)
HGB BLD-MCNC: 8.9 G/DL — LOW (ref 11.5–15.5)
HGB BLD-MCNC: 9.1 G/DL — LOW (ref 11.5–15.5)
HGB BLDA-MCNC: 9.4 G/DL — LOW (ref 11.5–15.5)
IMM GRANULOCYTES NFR BLD AUTO: 1 % — SIGNIFICANT CHANGE UP (ref 0–1.5)
IMM GRANULOCYTES NFR BLD AUTO: 1.5 % — SIGNIFICANT CHANGE UP (ref 0–1.5)
LACTATE BLDA-SCNC: 2.7 MMOL/L — HIGH (ref 0.5–2)
LYMPHOCYTES # BLD AUTO: 18 % — SIGNIFICANT CHANGE UP (ref 13–44)
LYMPHOCYTES # BLD AUTO: 18.5 % — SIGNIFICANT CHANGE UP (ref 13–44)
LYMPHOCYTES # BLD AUTO: 2.3 K/UL — SIGNIFICANT CHANGE UP (ref 1–3.3)
LYMPHOCYTES # BLD AUTO: 2.53 K/UL — SIGNIFICANT CHANGE UP (ref 1–3.3)
MAGNESIUM SERPL-MCNC: 2.1 MG/DL — SIGNIFICANT CHANGE UP (ref 1.6–2.6)
MAGNESIUM SERPL-MCNC: 2.2 MG/DL — SIGNIFICANT CHANGE UP (ref 1.6–2.6)
MCHC RBC-ENTMCNC: 28.7 PG — SIGNIFICANT CHANGE UP (ref 27–34)
MCHC RBC-ENTMCNC: 29 PG — SIGNIFICANT CHANGE UP (ref 27–34)
MCHC RBC-ENTMCNC: 29.3 % — LOW (ref 32–36)
MCHC RBC-ENTMCNC: 29.6 % — LOW (ref 32–36)
MCV RBC AUTO: 98 FL — SIGNIFICANT CHANGE UP (ref 80–100)
MCV RBC AUTO: 98.1 FL — SIGNIFICANT CHANGE UP (ref 80–100)
MONOCYTES # BLD AUTO: 1 K/UL — HIGH (ref 0–0.9)
MONOCYTES # BLD AUTO: 1.21 K/UL — HIGH (ref 0–0.9)
MONOCYTES NFR BLD AUTO: 8 % — SIGNIFICANT CHANGE UP (ref 2–14)
MONOCYTES NFR BLD AUTO: 8.6 % — SIGNIFICANT CHANGE UP (ref 2–14)
NEUTROPHILS # BLD AUTO: 8.63 K/UL — HIGH (ref 1.8–7.4)
NEUTROPHILS # BLD AUTO: 9.59 K/UL — HIGH (ref 1.8–7.4)
NEUTROPHILS NFR BLD AUTO: 68.2 % — SIGNIFICANT CHANGE UP (ref 43–77)
NEUTROPHILS NFR BLD AUTO: 69.4 % — SIGNIFICANT CHANGE UP (ref 43–77)
NRBC # FLD: 0.12 K/UL — LOW (ref 25–125)
NRBC # FLD: 0.17 K/UL — LOW (ref 25–125)
NRBC FLD-RTO: 1 — SIGNIFICANT CHANGE UP
NRBC FLD-RTO: 1.2 — SIGNIFICANT CHANGE UP
PCO2 BLDA: 38 MMHG — SIGNIFICANT CHANGE UP (ref 32–48)
PH BLDA: 7.53 PH — HIGH (ref 7.35–7.45)
PHOSPHATE SERPL-MCNC: 5.5 MG/DL — HIGH (ref 2.5–4.5)
PHOSPHATE SERPL-MCNC: 5.8 MG/DL — HIGH (ref 2.5–4.5)
PLATELET # BLD AUTO: 408 K/UL — HIGH (ref 150–400)
PLATELET # BLD AUTO: 442 K/UL — HIGH (ref 150–400)
PMV BLD: 9.8 FL — SIGNIFICANT CHANGE UP (ref 7–13)
PMV BLD: 9.9 FL — SIGNIFICANT CHANGE UP (ref 7–13)
PO2 BLDA: 79 MMHG — LOW (ref 83–108)
POTASSIUM BLDA-SCNC: 4.5 MMOL/L — SIGNIFICANT CHANGE UP (ref 3.4–4.5)
POTASSIUM SERPL-MCNC: 4.1 MMOL/L — SIGNIFICANT CHANGE UP (ref 3.5–5.3)
POTASSIUM SERPL-MCNC: 4.1 MMOL/L — SIGNIFICANT CHANGE UP (ref 3.5–5.3)
POTASSIUM SERPL-SCNC: 4.1 MMOL/L — SIGNIFICANT CHANGE UP (ref 3.5–5.3)
POTASSIUM SERPL-SCNC: 4.1 MMOL/L — SIGNIFICANT CHANGE UP (ref 3.5–5.3)
PROT SERPL-MCNC: 6.9 G/DL — SIGNIFICANT CHANGE UP (ref 6–8.3)
PROT SERPL-MCNC: 7.3 G/DL — SIGNIFICANT CHANGE UP (ref 6–8.3)
RBC # BLD: 3.07 M/UL — LOW (ref 3.8–5.2)
RBC # BLD: 3.17 M/UL — LOW (ref 3.8–5.2)
RBC # FLD: 20.4 % — HIGH (ref 10.3–14.5)
RBC # FLD: 21.3 % — HIGH (ref 10.3–14.5)
SAO2 % BLDA: 97 % — SIGNIFICANT CHANGE UP (ref 95–99)
SODIUM BLDA-SCNC: 142 MMOL/L — SIGNIFICANT CHANGE UP (ref 136–146)
SODIUM SERPL-SCNC: 140 MMOL/L — SIGNIFICANT CHANGE UP (ref 135–145)
SODIUM SERPL-SCNC: 146 MMOL/L — HIGH (ref 135–145)
SPECIMEN SOURCE: SIGNIFICANT CHANGE UP
SPECIMEN SOURCE: SIGNIFICANT CHANGE UP
WBC # BLD: 12.44 K/UL — HIGH (ref 3.8–10.5)
WBC # BLD: 14.06 K/UL — HIGH (ref 3.8–10.5)
WBC # FLD AUTO: 12.44 K/UL — HIGH (ref 3.8–10.5)
WBC # FLD AUTO: 14.06 K/UL — HIGH (ref 3.8–10.5)

## 2019-02-23 PROCEDURE — 99291 CRITICAL CARE FIRST HOUR: CPT

## 2019-02-23 RX ORDER — FUROSEMIDE 40 MG
20 TABLET ORAL ONCE
Qty: 0 | Refills: 0 | Status: COMPLETED | OUTPATIENT
Start: 2019-02-23 | End: 2019-02-23

## 2019-02-23 RX ORDER — NOREPINEPHRINE BITARTRATE/D5W 8 MG/250ML
0.05 PLASTIC BAG, INJECTION (ML) INTRAVENOUS
Qty: 16 | Refills: 0 | Status: DISCONTINUED | OUTPATIENT
Start: 2019-02-23 | End: 2019-02-26

## 2019-02-23 RX ORDER — SODIUM CHLORIDE 9 MG/ML
500 INJECTION, SOLUTION INTRAVENOUS ONCE
Qty: 0 | Refills: 0 | Status: COMPLETED | OUTPATIENT
Start: 2019-02-23 | End: 2019-02-23

## 2019-02-23 RX ORDER — ACETAMINOPHEN 500 MG
1000 TABLET ORAL ONCE
Qty: 0 | Refills: 0 | Status: COMPLETED | OUTPATIENT
Start: 2019-02-23 | End: 2019-02-23

## 2019-02-23 RX ADMIN — Medication 20 MILLIGRAM(S): at 17:58

## 2019-02-23 RX ADMIN — Medication 8: at 12:00

## 2019-02-23 RX ADMIN — HUMAN INSULIN 18 UNIT(S): 100 INJECTION, SUSPENSION SUBCUTANEOUS at 17:58

## 2019-02-23 RX ADMIN — DEXMEDETOMIDINE HYDROCHLORIDE IN 0.9% SODIUM CHLORIDE 5.61 MICROGRAM(S)/KG/HR: 4 INJECTION INTRAVENOUS at 06:52

## 2019-02-23 RX ADMIN — FENTANYL CITRATE 22.44 MICROGRAM(S)/KG/HR: 50 INJECTION INTRAVENOUS at 03:25

## 2019-02-23 RX ADMIN — SODIUM CHLORIDE 4 MILLILITER(S): 9 INJECTION INTRAMUSCULAR; INTRAVENOUS; SUBCUTANEOUS at 09:52

## 2019-02-23 RX ADMIN — Medication 1000 MILLIGRAM(S): at 18:30

## 2019-02-23 RX ADMIN — HEPARIN SODIUM 7500 UNIT(S): 5000 INJECTION INTRAVENOUS; SUBCUTANEOUS at 16:17

## 2019-02-23 RX ADMIN — Medication 1 APPLICATION(S): at 05:23

## 2019-02-23 RX ADMIN — SODIUM CHLORIDE 4 MILLILITER(S): 9 INJECTION INTRAMUSCULAR; INTRAVENOUS; SUBCUTANEOUS at 22:07

## 2019-02-23 RX ADMIN — HEPARIN SODIUM 7500 UNIT(S): 5000 INJECTION INTRAVENOUS; SUBCUTANEOUS at 05:24

## 2019-02-23 RX ADMIN — Medication 1 APPLICATION(S): at 17:58

## 2019-02-23 RX ADMIN — Medication 100 MICROGRAM(S): at 22:25

## 2019-02-23 RX ADMIN — HUMAN INSULIN 18 UNIT(S): 100 INJECTION, SUSPENSION SUBCUTANEOUS at 00:24

## 2019-02-23 RX ADMIN — Medication 40 MILLIGRAM(S): at 06:00

## 2019-02-23 RX ADMIN — HEPARIN SODIUM 7500 UNIT(S): 5000 INJECTION INTRAVENOUS; SUBCUTANEOUS at 21:53

## 2019-02-23 RX ADMIN — DEXMEDETOMIDINE HYDROCHLORIDE IN 0.9% SODIUM CHLORIDE 5.61 MICROGRAM(S)/KG/HR: 4 INJECTION INTRAVENOUS at 02:30

## 2019-02-23 RX ADMIN — SODIUM CHLORIDE 1000 MILLILITER(S): 9 INJECTION, SOLUTION INTRAVENOUS at 21:45

## 2019-02-23 RX ADMIN — CHLORHEXIDINE GLUCONATE 1 APPLICATION(S): 213 SOLUTION TOPICAL at 12:01

## 2019-02-23 RX ADMIN — Medication 2: at 17:58

## 2019-02-23 RX ADMIN — Medication 1 APPLICATION(S): at 12:02

## 2019-02-23 RX ADMIN — ERTAPENEM SODIUM 120 MILLIGRAM(S): 1 INJECTION, POWDER, LYOPHILIZED, FOR SOLUTION INTRAMUSCULAR; INTRAVENOUS at 04:58

## 2019-02-23 RX ADMIN — PANTOPRAZOLE SODIUM 40 MILLIGRAM(S): 20 TABLET, DELAYED RELEASE ORAL at 11:59

## 2019-02-23 RX ADMIN — CHLORHEXIDINE GLUCONATE 15 MILLILITER(S): 213 SOLUTION TOPICAL at 17:58

## 2019-02-23 RX ADMIN — Medication 2: at 06:16

## 2019-02-23 RX ADMIN — Medication 20 MILLIGRAM(S): at 06:23

## 2019-02-23 RX ADMIN — Medication 20 MILLIGRAM(S): at 21:52

## 2019-02-23 RX ADMIN — Medication 5.26 MICROGRAM(S)/KG/MIN: at 21:45

## 2019-02-23 RX ADMIN — HUMAN INSULIN 18 UNIT(S): 100 INJECTION, SUSPENSION SUBCUTANEOUS at 11:59

## 2019-02-23 RX ADMIN — Medication 10 MILLIGRAM(S): at 06:11

## 2019-02-23 RX ADMIN — CHLORHEXIDINE GLUCONATE 15 MILLILITER(S): 213 SOLUTION TOPICAL at 05:23

## 2019-02-23 RX ADMIN — Medication 400 MILLIGRAM(S): at 18:35

## 2019-02-23 RX ADMIN — SIMVASTATIN 20 MILLIGRAM(S): 20 TABLET, FILM COATED ORAL at 21:52

## 2019-02-23 RX ADMIN — SODIUM CHLORIDE 4 MILLILITER(S): 9 INJECTION INTRAMUSCULAR; INTRAVENOUS; SUBCUTANEOUS at 03:21

## 2019-02-23 RX ADMIN — Medication 20 MILLIGRAM(S): at 14:45

## 2019-02-23 RX ADMIN — SODIUM CHLORIDE 4 MILLILITER(S): 9 INJECTION INTRAMUSCULAR; INTRAVENOUS; SUBCUTANEOUS at 16:48

## 2019-02-23 RX ADMIN — HUMAN INSULIN 18 UNIT(S): 100 INJECTION, SUSPENSION SUBCUTANEOUS at 06:12

## 2019-02-23 NOTE — PROGRESS NOTE ADULT - ASSESSMENT
63 year old female with PMHx of HTN, DM, hypothyroidism, RA (prednisone 10mg), pulmonary HTN, CHRIS, and COPD (3L at home) who was originally admitted on 12/15 for SBO s/p ex lap with lysis of adhesions (4 retention sutures) c/b 12/30 she eviscerated 2/2 a coughing episode s/p ex lap (6 retention sutures) c/b multifocal pneumonia, developed hypercapnic respiratory failure requiring intubation 1/6, s/p bronch 1/9/18, and extubated on 1/24, course further c/b Enterococci bacteremia now readmitted to MICU for hypoxic respiratory failure 2/2 atelectasis requiring intubation 01/31. Overnight had grown ESBL in urine, now on ertapenem and levophed.     #Neuro:   - Intubated, comfortable appearing, alert, following commands.  - Currently on fentanyl and precedex on low doses    # Skin: L SC line 1/31    #GI  - SBO s/p ex-lap with findings of healthy bowel c/b evisceration of bowel requiring placement of retention sutures  - NPO with tube feeds   - Continue Protonix    #Renal/Metabolic  - Monitor for electrolyte derangements in the setting of diuresis; replete as needed  - trend BMP for BUN/Cr  - Will closely monitor Na with diuresis. Holding free water to maintain net negative.     #Endocrine  1) Diabetes Mellitus Type 2  - c/w NPH  - Gabapentin 400 TID held for now   - Endocrinology recommendations appreciated    2) Hypothyroidism   - c/w 100 mcg levothyroxine IV     3.) Rheumatoid arthritis:   - Continue solumedrol 10 daily, however monitor FSGs closely for hyperglycemia    # Volume status:   - Overall net negative  - Stopping free water flushes, goal net negative 1-2 L daily  - Continue lasix and metolazone     # Heme:  - Hemoglobin improved, likely from blood draws and bone marrow suppression  - s/p 1 unit pRBCs (2/20)  - WBC ct uptrending today    #DVT ppx  - Heparin 7500 SQ TID    #ID   - Continue ertapenem daily (day 5 of 10), increasing duration due to concern of low grade fever and increasing white count   - Off previous antibiotics as per discussion w/ ID - had 27 days of Vanc, 3 days of Ceftriaxone, and 8 days of Zosyn for enterococcal bacteremia and Klebsiella sputum culture   - Urine cx with ESBL 10-50k colonies and candida, potentially colonized  - C diff negative    #Cardiovascular  1) Previously reported to have cardiogenic shock. Overloaded currently, not requiring inotropes or pressors.  - Overall net negative 2.4 L in last 24 hours   - Continue to hold home blood pressure medications  - c/w Simvastatin 20mg   - c/w lasix 80 Q8 with metolazone daily    #Respiratory   1) Hypoxic Respiratory Failure 2/2 atelectasis requiring intubation 1/31.   - Concern for prolonged intubation and deconditioning. Has had prolonged hospital course with multiple intubations. Previous bronchoscopy w/ findings concerning for hyperdynamic airway collapse.   - Wean PEEP; SBT  - C/w sildenafil 20 TID, now off NO.   - Continue solumedrol 10 mg daily   - Will likely need tracheostomy  - Ethics consult appreciated.     # Ethics:   - [facility] discussion ongoing with Palliative Care team. Pending reconsult.   - Will need further discussions with family including son (HCP) and daughter coming in, regarding potential tracheostomy with ethics assistance  - Complicated family dynamic.   - Family requesting transfer to [facility] for second opinion. Curtis Pisano plans to obtain accepting physician. Once accepting physician is obtained, primary team will need to call transfer center per policy.   - Curtis Pisano requests that no information be given to patient without himself present.

## 2019-02-23 NOTE — PROGRESS NOTE ADULT - ATTENDING COMMENTS
Prolonged respiratory failure, unable to extubate given hypoxemia, poor mental status, and severe critical illness polyneuropathy. Attempted CPAP today with no success. Remains on 50%, PEEP decreased from 16 to 12. Continue lung protective ventilation. Continue ertapenem for ESBL E.coli UTI. Currently with fevers, unclear etiology. Follow-up cultures. Family still wishes transfer to outside facility which we can assist once family finds accepting physician from an outside facility.

## 2019-02-23 NOTE — PROGRESS NOTE ADULT - SUBJECTIVE AND OBJECTIVE BOX
CHIEF COMPLAINT: Patient is a 63y old  Female who presents with a chief complaint of small bowel obstruction (2019 15:26)    Interval Events:    REVIEW OF SYSTEMS:  Constitutional:   Eyes:  ENT:  CV:  Resp:  GI:  :  MSK:  Integumentary:  Neurological:  Psychiatric:  Endocrine:  Hematologic/Lymphatic:  Allergic/Immunologic:  [ ] All other systems negative  [ ] Unable to assess ROS because ________    OBJECTIVE:  ICU Vital Signs Last 24 Hrs  T(C): 37.8 (2019 04:00), Max: 37.8 (2019 20:00)  T(F): 100 (2019 04:00), Max: 100 (2019 20:00)  HR: 117 (2019 07:00) (93 - 126)  BP: 99/58 (2019 07:00) (83/55 - 135/83)  BP(mean): 70 (2019 07:00) (65 - 104)  ABP: --  ABP(mean): --  RR: 28 (2019 07:00) (25 - 29)  SpO2: 92% (2019 07:00) (91% - 100%)    Mode: AC/ CMV (Assist Control/ Continuous Mandatory Ventilation), RR (machine): 28, TV (machine): 350, FiO2: 50, PEEP: 12, ITime: 0.56, MAP: 21, PIP: 30     @ 07:01  -  02-23 @ 07:00  --------------------------------------------------------  IN: 1640.4 mL / OUT: 3530 mL / NET: -1889.6 mL      CAPILLARY BLOOD GLUCOSE      POCT Blood Glucose.: 163 mg/dL (2019 06:09)      PHYSICAL EXAM:  General:   HEENT:   Lymph Nodes:  Neck:   Respiratory:   Cardiovascular:   Abdomen:   Extremities:   Skin:   Neurological:  Psychiatry:    HOSPITAL MEDICATIONS:  MEDICATIONS  (STANDING):  chlorhexidine 0.12% Liquid 15 milliLiter(s) Swish and Spit every 12 hours  chlorhexidine 4% Liquid 1 Application(s) Topical <User Schedule>  collagenase Ointment 1 Application(s) Topical daily  dexmedetomidine Infusion 0.2 MICROgram(s)/kG/Hr (5.61 mL/Hr) IV Continuous <Continuous>  dextrose 5%. 1000 milliLiter(s) (50 mL/Hr) IV Continuous <Continuous>  dextrose 50% Injectable 12.5 Gram(s) IV Push once  dextrose 50% Injectable 25 Gram(s) IV Push once  dextrose 50% Injectable 25 Gram(s) IV Push once  ertapenem  IVPB 1000 milliGRAM(s) IV Intermittent every 24 hours  fentaNYL   Infusion. 2 MICROgram(s)/kG/Hr (22.44 mL/Hr) IV Continuous <Continuous>  furosemide   Injectable 40 milliGRAM(s) IV Push every 8 hours  heparin  Injectable 7500 Unit(s) SubCutaneous every 8 hours  influenza   Vaccine 0.5 milliLiter(s) IntraMuscular once  insulin lispro (HumaLOG) corrective regimen sliding scale   SubCutaneous every 6 hours  insulin NPH human recombinant 18 Unit(s) SubCutaneous every 6 hours  levothyroxine Injectable 100 MICROGram(s) IV Push at bedtime  methylPREDNISolone sodium succinate Injectable 10 milliGRAM(s) IV Push daily  metolazone 5 milliGRAM(s) Oral daily  pantoprazole  Injectable 40 milliGRAM(s) IV Push daily  petrolatum Ophthalmic Ointment 1 Application(s) Both EYES every 12 hours  sildenafil (REVATIO) 20 milliGRAM(s) Oral three times a day  simvastatin 20 milliGRAM(s) Oral at bedtime  sodium chloride 3%  Inhalation 4 milliLiter(s) Inhalation every 6 hours    MEDICATIONS  (PRN):  acetaminophen    Suspension .. 650 milliGRAM(s) Oral every 6 hours PRN Temp greater or equal to 38C (100.4F), Mild Pain (1 - 3), Moderate Pain (4 - 6)  dextrose 40% Gel 15 Gram(s) Oral once PRN Blood Glucose LESS THAN 70 milliGRAM(s)/deciliter  glucagon  Injectable 1 milliGRAM(s) IntraMuscular once PRN Glucose LESS THAN 70 milligrams/deciliter  glucagon  Injectable 1 milliGRAM(s) IntraMuscular once PRN Glucose LESS THAN 70 milligrams/deciliter      LABS:  ( @ 03:00)                        9.1  14.06 )-----------( 442                 31.1    Neutrophils = 9.59 (68.2%)  Lymphocytes = 2.53 (18.0%)  Eosinophils = 0.40 (2.8%)  Basophils = 0.12 (0.9%)  Monocytes = 1.21 (8.6%)  Bands = --%    WBC Trend: 14.06<--, 15.23<--, 12.11<--  Hb Trend: 9.1<--, 9.2<--, 8.4<--, 8.1<--, 8.1<--  Plt Trend: 442<--, 485<--, 465<--, 460<--, 440<--      146<H>  |  91<L>  |  45<H>  ----------------------------<  150<H>  4.1   |  30  |  1.02    Ca    10.3      2019 03:00  Phos  5.8       Mg     2.1         TPro  7.3  /  Alb  3.3  /  TBili  0.5  /  DBili  x   /  AST  29  /  ALT  25  /  AlkPhos  161<H>      Creatinine Trend: 1.02<--, 0.92<--, 0.77<--, 0.79<--, 0.77<--, 0.72<--    Urinalysis Basic - ( 2019 13:40 )    Color: LIGHT YELLOW / Appearance: CLEAR / S.012 / pH: 7.5  Gluc: NEGATIVE / Ketone: TRACE  / Bili: NEGATIVE / Urobili: NORMAL   Blood: TRACE / Protein: 20 / Nitrite: NEGATIVE   Leuk Esterase: NEGATIVE / RBC: 6-10 / WBC 3-5   Sq Epi: OCC / Non Sq Epi: x / Bacteria: NEGATIVE                MICROBIOLOGY:   Blood Cx:  Urine Cx:  Sputum Cx:  Legionella:  RVP:    RADIOLOGY:  X Ray:  CT:  MRI:  Ultrasound:  [ ] Reviewed and interpreted by me    EKG: CHIEF COMPLAINT: Patient is a 63y old  Female who presents with a chief complaint of small bowel obstruction (2019 15:26)    Interval Events: Remains of 50% FiO2;       OBJECTIVE:  ICU Vital Signs Last 24 Hrs  T(C): 37.8 (2019 04:00), Max: 37.8 (2019 20:00)  T(F): 100 (2019 04:00), Max: 100 (2019 20:00)  HR: 117 (2019 07:00) (93 - 126)  BP: 99/58 (2019 07:00) (83/55 - 135/83)  BP(mean): 70 (2019 07:00) (65 - 104)  ABP: --  ABP(mean): --  RR: 28 (2019 07:00) (25 - 29)  SpO2: 92% (2019 07:00) (91% - 100%)    Mode: AC/ CMV (Assist Control/ Continuous Mandatory Ventilation), RR (machine): 28, TV (machine): 350, FiO2: 50, PEEP: 12, ITime: 0.56, MAP: 21, PIP: 30    02- @ 07:01  -  02-23 @ 07:00  --------------------------------------------------------  IN: 1640.4 mL / OUT: 3530 mL / NET: -1889.6 mL      CAPILLARY BLOOD GLUCOSE      POCT Blood Glucose.: 163 mg/dL (2019 06:09)      PHYSICAL EXAM:  General:   HEENT:   Lymph Nodes:  Neck:   Respiratory:   Cardiovascular:   Abdomen:   Extremities:   Skin:   Neurological:  Psychiatry:    HOSPITAL MEDICATIONS:  MEDICATIONS  (STANDING):  chlorhexidine 0.12% Liquid 15 milliLiter(s) Swish and Spit every 12 hours  chlorhexidine 4% Liquid 1 Application(s) Topical <User Schedule>  collagenase Ointment 1 Application(s) Topical daily  dexmedetomidine Infusion 0.2 MICROgram(s)/kG/Hr (5.61 mL/Hr) IV Continuous <Continuous>  dextrose 5%. 1000 milliLiter(s) (50 mL/Hr) IV Continuous <Continuous>  dextrose 50% Injectable 12.5 Gram(s) IV Push once  dextrose 50% Injectable 25 Gram(s) IV Push once  dextrose 50% Injectable 25 Gram(s) IV Push once  ertapenem  IVPB 1000 milliGRAM(s) IV Intermittent every 24 hours  fentaNYL   Infusion. 2 MICROgram(s)/kG/Hr (22.44 mL/Hr) IV Continuous <Continuous>  furosemide   Injectable 40 milliGRAM(s) IV Push every 8 hours  heparin  Injectable 7500 Unit(s) SubCutaneous every 8 hours  influenza   Vaccine 0.5 milliLiter(s) IntraMuscular once  insulin lispro (HumaLOG) corrective regimen sliding scale   SubCutaneous every 6 hours  insulin NPH human recombinant 18 Unit(s) SubCutaneous every 6 hours  levothyroxine Injectable 100 MICROGram(s) IV Push at bedtime  methylPREDNISolone sodium succinate Injectable 10 milliGRAM(s) IV Push daily  metolazone 5 milliGRAM(s) Oral daily  pantoprazole  Injectable 40 milliGRAM(s) IV Push daily  petrolatum Ophthalmic Ointment 1 Application(s) Both EYES every 12 hours  sildenafil (REVATIO) 20 milliGRAM(s) Oral three times a day  simvastatin 20 milliGRAM(s) Oral at bedtime  sodium chloride 3%  Inhalation 4 milliLiter(s) Inhalation every 6 hours    MEDICATIONS  (PRN):  acetaminophen    Suspension .. 650 milliGRAM(s) Oral every 6 hours PRN Temp greater or equal to 38C (100.4F), Mild Pain (1 - 3), Moderate Pain (4 - 6)  dextrose 40% Gel 15 Gram(s) Oral once PRN Blood Glucose LESS THAN 70 milliGRAM(s)/deciliter  glucagon  Injectable 1 milliGRAM(s) IntraMuscular once PRN Glucose LESS THAN 70 milligrams/deciliter  glucagon  Injectable 1 milliGRAM(s) IntraMuscular once PRN Glucose LESS THAN 70 milligrams/deciliter      LABS:  ( @ 03:00)                        9.1  14.06 )-----------( 442                 31.1    Neutrophils = 9.59 (68.2%)  Lymphocytes = 2.53 (18.0%)  Eosinophils = 0.40 (2.8%)  Basophils = 0.12 (0.9%)  Monocytes = 1.21 (8.6%)  Bands = --%    WBC Trend: 14.06<--, 15.23<--, 12.11<--  Hb Trend: 9.1<--, 9.2<--, 8.4<--, 8.1<--, 8.1<--  Plt Trend: 442<--, 485<--, 465<--, 460<--, 440<--      146<H>  |  91<L>  |  45<H>  ----------------------------<  150<H>  4.1   |  30  |  1.02    Ca    10.3      2019 03:00  Phos  5.8       Mg     2.1         TPro  7.3  /  Alb  3.3  /  TBili  0.5  /  DBili  x   /  AST  29  /  ALT  25  /  AlkPhos  161<H>      Creatinine Trend: 1.02<--, 0.92<--, 0.77<--, 0.79<--, 0.77<--, 0.72<--    Urinalysis Basic - ( 2019 13:40 )    Color: LIGHT YELLOW / Appearance: CLEAR / S.012 / pH: 7.5  Gluc: NEGATIVE / Ketone: TRACE  / Bili: NEGATIVE / Urobili: NORMAL   Blood: TRACE / Protein: 20 / Nitrite: NEGATIVE   Leuk Esterase: NEGATIVE / RBC: 6-10 / WBC 3-5   Sq Epi: OCC / Non Sq Epi: x / Bacteria: NEGATIVE                MICROBIOLOGY:   Blood Cx:  Urine Cx:  Sputum Cx:  Legionella:  RVP:    RADIOLOGY:  X Ray:  CT:  MRI:  Ultrasound:  [ ] Reviewed and interpreted by me    EKG: CHIEF COMPLAINT: Patient is a 63y old  Female who presents with a chief complaint of small bowel obstruction (2019 15:26)    Interval Events: Remains of 50% FiO2; PEEP       OBJECTIVE:  ICU Vital Signs Last 24 Hrs  T(C): 37.8 (2019 04:00), Max: 37.8 (2019 20:00)  T(F): 100 (2019 04:00), Max: 100 (2019 20:00)  HR: 117 (2019 07:00) (93 - 126)  BP: 99/58 (2019 07:00) (83/55 - 135/83)  BP(mean): 70 (2019 07:00) (65 - 104)  ABP: --  ABP(mean): --  RR: 28 (2019 07:00) (25 - 29)  SpO2: 92% (2019 07:00) (91% - 100%)    Mode: AC/ CMV (Assist Control/ Continuous Mandatory Ventilation), RR (machine): 28, TV (machine): 350, FiO2: 50, PEEP: 12, ITime: 0.56, MAP: 21, PIP: 30    02-22 @ 07:01  -  02-23 @ 07:00  --------------------------------------------------------  IN: 1640.4 mL / OUT: 3530 mL / NET: -1889.6 mL      CAPILLARY BLOOD GLUCOSE      POCT Blood Glucose.: 163 mg/dL (2019 06:09)      PHYSICAL EXAM:  General:   HEENT:   Lymph Nodes:  Neck:   Respiratory:   Cardiovascular:   Abdomen:   Extremities:   Skin:   Neurological:  Psychiatry:    HOSPITAL MEDICATIONS:  MEDICATIONS  (STANDING):  chlorhexidine 0.12% Liquid 15 milliLiter(s) Swish and Spit every 12 hours  chlorhexidine 4% Liquid 1 Application(s) Topical <User Schedule>  collagenase Ointment 1 Application(s) Topical daily  dexmedetomidine Infusion 0.2 MICROgram(s)/kG/Hr (5.61 mL/Hr) IV Continuous <Continuous>  dextrose 5%. 1000 milliLiter(s) (50 mL/Hr) IV Continuous <Continuous>  dextrose 50% Injectable 12.5 Gram(s) IV Push once  dextrose 50% Injectable 25 Gram(s) IV Push once  dextrose 50% Injectable 25 Gram(s) IV Push once  ertapenem  IVPB 1000 milliGRAM(s) IV Intermittent every 24 hours  fentaNYL   Infusion. 2 MICROgram(s)/kG/Hr (22.44 mL/Hr) IV Continuous <Continuous>  furosemide   Injectable 40 milliGRAM(s) IV Push every 8 hours  heparin  Injectable 7500 Unit(s) SubCutaneous every 8 hours  influenza   Vaccine 0.5 milliLiter(s) IntraMuscular once  insulin lispro (HumaLOG) corrective regimen sliding scale   SubCutaneous every 6 hours  insulin NPH human recombinant 18 Unit(s) SubCutaneous every 6 hours  levothyroxine Injectable 100 MICROGram(s) IV Push at bedtime  methylPREDNISolone sodium succinate Injectable 10 milliGRAM(s) IV Push daily  metolazone 5 milliGRAM(s) Oral daily  pantoprazole  Injectable 40 milliGRAM(s) IV Push daily  petrolatum Ophthalmic Ointment 1 Application(s) Both EYES every 12 hours  sildenafil (REVATIO) 20 milliGRAM(s) Oral three times a day  simvastatin 20 milliGRAM(s) Oral at bedtime  sodium chloride 3%  Inhalation 4 milliLiter(s) Inhalation every 6 hours    MEDICATIONS  (PRN):  acetaminophen    Suspension .. 650 milliGRAM(s) Oral every 6 hours PRN Temp greater or equal to 38C (100.4F), Mild Pain (1 - 3), Moderate Pain (4 - 6)  dextrose 40% Gel 15 Gram(s) Oral once PRN Blood Glucose LESS THAN 70 milliGRAM(s)/deciliter  glucagon  Injectable 1 milliGRAM(s) IntraMuscular once PRN Glucose LESS THAN 70 milligrams/deciliter  glucagon  Injectable 1 milliGRAM(s) IntraMuscular once PRN Glucose LESS THAN 70 milligrams/deciliter      LABS:  ( @ 03:00)                        9.1  14.06 )-----------( 442                 31.1    Neutrophils = 9.59 (68.2%)  Lymphocytes = 2.53 (18.0%)  Eosinophils = 0.40 (2.8%)  Basophils = 0.12 (0.9%)  Monocytes = 1.21 (8.6%)  Bands = --%    WBC Trend: 14.06<--, 15.23<--, 12.11<--  Hb Trend: 9.1<--, 9.2<--, 8.4<--, 8.1<--, 8.1<--  Plt Trend: 442<--, 485<--, 465<--, 460<--, 440<--      146<H>  |  91<L>  |  45<H>  ----------------------------<  150<H>  4.1   |  30  |  1.02    Ca    10.3      2019 03:00  Phos  5.8       Mg     2.1         TPro  7.3  /  Alb  3.3  /  TBili  0.5  /  DBili  x   /  AST  29  /  ALT  25  /  AlkPhos  161<H>      Creatinine Trend: 1.02<--, 0.92<--, 0.77<--, 0.79<--, 0.77<--, 0.72<--    Urinalysis Basic - ( 2019 13:40 )    Color: LIGHT YELLOW / Appearance: CLEAR / S.012 / pH: 7.5  Gluc: NEGATIVE / Ketone: TRACE  / Bili: NEGATIVE / Urobili: NORMAL   Blood: TRACE / Protein: 20 / Nitrite: NEGATIVE   Leuk Esterase: NEGATIVE / RBC: 6-10 / WBC 3-5   Sq Epi: OCC / Non Sq Epi: x / Bacteria: NEGATIVE                MICROBIOLOGY:   Blood Cx:  Urine Cx:  Sputum Cx:  Legionella:  RVP:    RADIOLOGY:  X Ray:  CT:  MRI:  Ultrasound:  [ ] Reviewed and interpreted by me    EKG: CHIEF COMPLAINT: Patient is a 63y old  Female who presents with a chief complaint of small bowel obstruction (2019 15:26)    Interval Events: Remains of 50% FiO2; PEEP decreased to 12.       OBJECTIVE:  ICU Vital Signs Last 24 Hrs  T(C): 37.8 (2019 04:00), Max: 37.8 (2019 20:00)  T(F): 100 (2019 04:00), Max: 100 (2019 20:00)  HR: 117 (2019 07:00) (93 - 126)  BP: 99/58 (2019 07:00) (83/55 - 135/83)  BP(mean): 70 (2019 07:00) (65 - 104)  ABP: --  ABP(mean): --  RR: 28 (2019 07:00) (25 - 29)  SpO2: 92% (2019 07:00) (91% - 100%)    Mode: AC/ CMV (Assist Control/ Continuous Mandatory Ventilation), RR (machine): 28, TV (machine): 350, FiO2: 50, PEEP: 12, ITime: 0.56, MAP: 21, PIP: 30    02- @ 07:01  -  02- @ 07:00  --------------------------------------------------------  IN: 1640.4 mL / OUT: 3530 mL / NET: -1889.6 mL      CAPILLARY BLOOD GLUCOSE      POCT Blood Glucose.: 163 mg/dL (2019 06:09)      PHYSICAL EXAM:  General: intubated; sedated.   Neck: supple   Respiratory: CTA b/l  Cardiovascular: Tachycardic S1S2  Abdomen: soft, NTND  Extremities: no peripheral edema.   Neurological: arousable to tactile stimuli       HOSPITAL MEDICATIONS:  MEDICATIONS  (STANDING):  chlorhexidine 0.12% Liquid 15 milliLiter(s) Swish and Spit every 12 hours  chlorhexidine 4% Liquid 1 Application(s) Topical <User Schedule>  collagenase Ointment 1 Application(s) Topical daily  dexmedetomidine Infusion 0.2 MICROgram(s)/kG/Hr (5.61 mL/Hr) IV Continuous <Continuous>  dextrose 5%. 1000 milliLiter(s) (50 mL/Hr) IV Continuous <Continuous>  dextrose 50% Injectable 12.5 Gram(s) IV Push once  dextrose 50% Injectable 25 Gram(s) IV Push once  dextrose 50% Injectable 25 Gram(s) IV Push once  ertapenem  IVPB 1000 milliGRAM(s) IV Intermittent every 24 hours  fentaNYL   Infusion. 2 MICROgram(s)/kG/Hr (22.44 mL/Hr) IV Continuous <Continuous>  furosemide   Injectable 40 milliGRAM(s) IV Push every 8 hours  heparin  Injectable 7500 Unit(s) SubCutaneous every 8 hours  influenza   Vaccine 0.5 milliLiter(s) IntraMuscular once  insulin lispro (HumaLOG) corrective regimen sliding scale   SubCutaneous every 6 hours  insulin NPH human recombinant 18 Unit(s) SubCutaneous every 6 hours  levothyroxine Injectable 100 MICROGram(s) IV Push at bedtime  methylPREDNISolone sodium succinate Injectable 10 milliGRAM(s) IV Push daily  metolazone 5 milliGRAM(s) Oral daily  pantoprazole  Injectable 40 milliGRAM(s) IV Push daily  petrolatum Ophthalmic Ointment 1 Application(s) Both EYES every 12 hours  sildenafil (REVATIO) 20 milliGRAM(s) Oral three times a day  simvastatin 20 milliGRAM(s) Oral at bedtime  sodium chloride 3%  Inhalation 4 milliLiter(s) Inhalation every 6 hours    MEDICATIONS  (PRN):  acetaminophen    Suspension .. 650 milliGRAM(s) Oral every 6 hours PRN Temp greater or equal to 38C (100.4F), Mild Pain (1 - 3), Moderate Pain (4 - 6)  dextrose 40% Gel 15 Gram(s) Oral once PRN Blood Glucose LESS THAN 70 milliGRAM(s)/deciliter  glucagon  Injectable 1 milliGRAM(s) IntraMuscular once PRN Glucose LESS THAN 70 milligrams/deciliter  glucagon  Injectable 1 milliGRAM(s) IntraMuscular once PRN Glucose LESS THAN 70 milligrams/deciliter      LABS:  ( @ 03:00)                        9.1  14.06 )-----------( 442                 31.1    Neutrophils = 9.59 (68.2%)  Lymphocytes = 2.53 (18.0%)  Eosinophils = 0.40 (2.8%)  Basophils = 0.12 (0.9%)  Monocytes = 1.21 (8.6%)  Bands = --%    WBC Trend: 14.06<--, 15.23<--, 12.11<--  Hb Trend: 9.1<--, 9.2<--, 8.4<--, 8.1<--, 8.1<--  Plt Trend: 442<--, 485<--, 465<--, 460<--, 440<--      146<H>  |  91<L>  |  45<H>  ----------------------------<  150<H>  4.1   |  30  |  1.02    Ca    10.3      2019 03:00  Phos  5.8       Mg     2.1         TPro  7.3  /  Alb  3.3  /  TBili  0.5  /  DBili  x   /  AST  29  /  ALT  25  /  AlkPhos  161<H>      Creatinine Trend: 1.02<--, 0.92<--, 0.77<--, 0.79<--, 0.77<--, 0.72<--    Urinalysis Basic - ( 2019 13:40 )    Color: LIGHT YELLOW / Appearance: CLEAR / S.012 / pH: 7.5  Gluc: NEGATIVE / Ketone: TRACE  / Bili: NEGATIVE / Urobili: NORMAL   Blood: TRACE / Protein: 20 / Nitrite: NEGATIVE   Leuk Esterase: NEGATIVE / RBC: 6-10 / WBC 3-5   Sq Epi: OCC / Non Sq Epi: x / Bacteria: NEGATIVE

## 2019-02-24 LAB
ALBUMIN SERPL ELPH-MCNC: 3.6 G/DL — SIGNIFICANT CHANGE UP (ref 3.3–5)
ALP SERPL-CCNC: 167 U/L — HIGH (ref 40–120)
ALT FLD-CCNC: 27 U/L — SIGNIFICANT CHANGE UP (ref 4–33)
ANION GAP SERPL CALC-SCNC: 21 MMO/L — HIGH (ref 7–14)
APPEARANCE UR: CLEAR — SIGNIFICANT CHANGE UP
APTT BLD: 34.3 SEC — SIGNIFICANT CHANGE UP (ref 27.5–36.3)
AST SERPL-CCNC: 31 U/L — SIGNIFICANT CHANGE UP (ref 4–32)
BACTERIA # UR AUTO: NEGATIVE — SIGNIFICANT CHANGE UP
BASOPHILS # BLD AUTO: 0.14 K/UL — SIGNIFICANT CHANGE UP (ref 0–0.2)
BASOPHILS NFR BLD AUTO: 0.8 % — SIGNIFICANT CHANGE UP (ref 0–2)
BILIRUB SERPL-MCNC: 0.5 MG/DL — SIGNIFICANT CHANGE UP (ref 0.2–1.2)
BILIRUB UR-MCNC: NEGATIVE — SIGNIFICANT CHANGE UP
BLD GP AB SCN SERPL QL: NEGATIVE — SIGNIFICANT CHANGE UP
BLOOD UR QL VISUAL: NEGATIVE — SIGNIFICANT CHANGE UP
BUN SERPL-MCNC: 52 MG/DL — HIGH (ref 7–23)
CALCIUM SERPL-MCNC: 10.3 MG/DL — SIGNIFICANT CHANGE UP (ref 8.4–10.5)
CHLORIDE SERPL-SCNC: 92 MMOL/L — LOW (ref 98–107)
CO2 SERPL-SCNC: 26 MMOL/L — SIGNIFICANT CHANGE UP (ref 22–31)
COLOR SPEC: SIGNIFICANT CHANGE UP
CREAT SERPL-MCNC: 1.24 MG/DL — SIGNIFICANT CHANGE UP (ref 0.5–1.3)
EOSINOPHIL # BLD AUTO: 0.63 K/UL — HIGH (ref 0–0.5)
EOSINOPHIL NFR BLD AUTO: 3.5 % — SIGNIFICANT CHANGE UP (ref 0–6)
GLUCOSE BLDC GLUCOMTR-MCNC: 168 MG/DL — HIGH (ref 70–99)
GLUCOSE BLDC GLUCOMTR-MCNC: 221 MG/DL — HIGH (ref 70–99)
GLUCOSE BLDC GLUCOMTR-MCNC: 227 MG/DL — HIGH (ref 70–99)
GLUCOSE BLDC GLUCOMTR-MCNC: 308 MG/DL — HIGH (ref 70–99)
GLUCOSE SERPL-MCNC: 239 MG/DL — HIGH (ref 70–99)
GLUCOSE UR-MCNC: NEGATIVE — SIGNIFICANT CHANGE UP
GRAM STN SPT: SIGNIFICANT CHANGE UP
HCT VFR BLD CALC: 34.1 % — LOW (ref 34.5–45)
HGB BLD-MCNC: 9.9 G/DL — LOW (ref 11.5–15.5)
HYALINE CASTS # UR AUTO: NEGATIVE — SIGNIFICANT CHANGE UP
IMM GRANULOCYTES NFR BLD AUTO: 1.2 % — SIGNIFICANT CHANGE UP (ref 0–1.5)
INR BLD: 1.11 — SIGNIFICANT CHANGE UP (ref 0.88–1.17)
KETONES UR-MCNC: NEGATIVE — SIGNIFICANT CHANGE UP
LEUKOCYTE ESTERASE UR-ACNC: SIGNIFICANT CHANGE UP
LYMPHOCYTES # BLD AUTO: 23.5 % — SIGNIFICANT CHANGE UP (ref 13–44)
LYMPHOCYTES # BLD AUTO: 4.18 K/UL — HIGH (ref 1–3.3)
MAGNESIUM SERPL-MCNC: 2.2 MG/DL — SIGNIFICANT CHANGE UP (ref 1.6–2.6)
MCHC RBC-ENTMCNC: 28.8 PG — SIGNIFICANT CHANGE UP (ref 27–34)
MCHC RBC-ENTMCNC: 29 % — LOW (ref 32–36)
MCV RBC AUTO: 99.1 FL — SIGNIFICANT CHANGE UP (ref 80–100)
MONOCYTES # BLD AUTO: 1.48 K/UL — HIGH (ref 0–0.9)
MONOCYTES NFR BLD AUTO: 8.3 % — SIGNIFICANT CHANGE UP (ref 2–14)
NEUTROPHILS # BLD AUTO: 11.15 K/UL — HIGH (ref 1.8–7.4)
NEUTROPHILS NFR BLD AUTO: 62.7 % — SIGNIFICANT CHANGE UP (ref 43–77)
NITRITE UR-MCNC: NEGATIVE — SIGNIFICANT CHANGE UP
NRBC # FLD: 0.13 K/UL — LOW (ref 25–125)
PH UR: 8 — SIGNIFICANT CHANGE UP (ref 5–8)
PHOSPHATE SERPL-MCNC: 5.6 MG/DL — HIGH (ref 2.5–4.5)
PLATELET # BLD AUTO: 516 K/UL — HIGH (ref 150–400)
PMV BLD: 9.8 FL — SIGNIFICANT CHANGE UP (ref 7–13)
POTASSIUM SERPL-MCNC: 3.9 MMOL/L — SIGNIFICANT CHANGE UP (ref 3.5–5.3)
POTASSIUM SERPL-SCNC: 3.9 MMOL/L — SIGNIFICANT CHANGE UP (ref 3.5–5.3)
PROT SERPL-MCNC: 7.7 G/DL — SIGNIFICANT CHANGE UP (ref 6–8.3)
PROT UR-MCNC: 30 — SIGNIFICANT CHANGE UP
PROTHROM AB SERPL-ACNC: 12.7 SEC — SIGNIFICANT CHANGE UP (ref 9.8–13.1)
RBC # BLD: 3.44 M/UL — LOW (ref 3.8–5.2)
RBC # FLD: 20.3 % — HIGH (ref 10.3–14.5)
RBC CASTS # UR COMP ASSIST: SIGNIFICANT CHANGE UP (ref 0–?)
RH IG SCN BLD-IMP: POSITIVE — SIGNIFICANT CHANGE UP
SODIUM SERPL-SCNC: 139 MMOL/L — SIGNIFICANT CHANGE UP (ref 135–145)
SP GR SPEC: 1.01 — SIGNIFICANT CHANGE UP (ref 1–1.04)
SPECIMEN SOURCE: SIGNIFICANT CHANGE UP
SQUAMOUS # UR AUTO: SIGNIFICANT CHANGE UP
UROBILINOGEN FLD QL: NORMAL — SIGNIFICANT CHANGE UP
WBC # BLD: 17.8 K/UL — HIGH (ref 3.8–10.5)
WBC # FLD AUTO: 17.8 K/UL — HIGH (ref 3.8–10.5)
WBC UR QL: HIGH (ref 0–?)

## 2019-02-24 PROCEDURE — 99291 CRITICAL CARE FIRST HOUR: CPT | Mod: 25

## 2019-02-24 PROCEDURE — 99232 SBSQ HOSP IP/OBS MODERATE 35: CPT

## 2019-02-24 PROCEDURE — 36569 INSJ PICC 5 YR+ W/O IMAGING: CPT

## 2019-02-24 PROCEDURE — 71045 X-RAY EXAM CHEST 1 VIEW: CPT | Mod: 26

## 2019-02-24 RX ORDER — HUMAN INSULIN 100 [IU]/ML
20 INJECTION, SUSPENSION SUBCUTANEOUS EVERY 6 HOURS
Qty: 0 | Refills: 0 | Status: DISCONTINUED | OUTPATIENT
Start: 2019-02-24 | End: 2019-02-25

## 2019-02-24 RX ORDER — MIDAZOLAM HYDROCHLORIDE 1 MG/ML
3 INJECTION, SOLUTION INTRAMUSCULAR; INTRAVENOUS ONCE
Qty: 0 | Refills: 0 | Status: DISCONTINUED | OUTPATIENT
Start: 2019-02-24 | End: 2019-02-24

## 2019-02-24 RX ORDER — MIDAZOLAM HYDROCHLORIDE 1 MG/ML
4 INJECTION, SOLUTION INTRAMUSCULAR; INTRAVENOUS ONCE
Qty: 0 | Refills: 0 | Status: DISCONTINUED | OUTPATIENT
Start: 2019-02-24 | End: 2019-02-24

## 2019-02-24 RX ADMIN — Medication 20 MILLIGRAM(S): at 05:32

## 2019-02-24 RX ADMIN — HEPARIN SODIUM 7500 UNIT(S): 5000 INJECTION INTRAVENOUS; SUBCUTANEOUS at 05:31

## 2019-02-24 RX ADMIN — PANTOPRAZOLE SODIUM 40 MILLIGRAM(S): 20 TABLET, DELAYED RELEASE ORAL at 12:18

## 2019-02-24 RX ADMIN — FENTANYL CITRATE 22.44 MICROGRAM(S)/KG/HR: 50 INJECTION INTRAVENOUS at 20:01

## 2019-02-24 RX ADMIN — Medication 1 APPLICATION(S): at 05:32

## 2019-02-24 RX ADMIN — HUMAN INSULIN 20 UNIT(S): 100 INJECTION, SUSPENSION SUBCUTANEOUS at 17:26

## 2019-02-24 RX ADMIN — Medication 5.26 MICROGRAM(S)/KG/MIN: at 17:27

## 2019-02-24 RX ADMIN — SODIUM CHLORIDE 4 MILLILITER(S): 9 INJECTION INTRAMUSCULAR; INTRAVENOUS; SUBCUTANEOUS at 16:03

## 2019-02-24 RX ADMIN — FENTANYL CITRATE 22.44 MICROGRAM(S)/KG/HR: 50 INJECTION INTRAVENOUS at 17:27

## 2019-02-24 RX ADMIN — DEXMEDETOMIDINE HYDROCHLORIDE IN 0.9% SODIUM CHLORIDE 5.61 MICROGRAM(S)/KG/HR: 4 INJECTION INTRAVENOUS at 20:01

## 2019-02-24 RX ADMIN — Medication 1 APPLICATION(S): at 12:18

## 2019-02-24 RX ADMIN — FENTANYL CITRATE 22.44 MICROGRAM(S)/KG/HR: 50 INJECTION INTRAVENOUS at 02:00

## 2019-02-24 RX ADMIN — CHLORHEXIDINE GLUCONATE 15 MILLILITER(S): 213 SOLUTION TOPICAL at 17:25

## 2019-02-24 RX ADMIN — HUMAN INSULIN 18 UNIT(S): 100 INJECTION, SUSPENSION SUBCUTANEOUS at 06:10

## 2019-02-24 RX ADMIN — DEXMEDETOMIDINE HYDROCHLORIDE IN 0.9% SODIUM CHLORIDE 5.61 MICROGRAM(S)/KG/HR: 4 INJECTION INTRAVENOUS at 00:27

## 2019-02-24 RX ADMIN — Medication 5.26 MICROGRAM(S)/KG/MIN: at 20:01

## 2019-02-24 RX ADMIN — DEXMEDETOMIDINE HYDROCHLORIDE IN 0.9% SODIUM CHLORIDE 5.61 MICROGRAM(S)/KG/HR: 4 INJECTION INTRAVENOUS at 17:28

## 2019-02-24 RX ADMIN — HUMAN INSULIN 18 UNIT(S): 100 INJECTION, SUSPENSION SUBCUTANEOUS at 00:19

## 2019-02-24 RX ADMIN — SODIUM CHLORIDE 4 MILLILITER(S): 9 INJECTION INTRAMUSCULAR; INTRAVENOUS; SUBCUTANEOUS at 03:10

## 2019-02-24 RX ADMIN — SODIUM CHLORIDE 4 MILLILITER(S): 9 INJECTION INTRAMUSCULAR; INTRAVENOUS; SUBCUTANEOUS at 09:39

## 2019-02-24 RX ADMIN — HUMAN INSULIN 18 UNIT(S): 100 INJECTION, SUSPENSION SUBCUTANEOUS at 12:17

## 2019-02-24 RX ADMIN — DEXMEDETOMIDINE HYDROCHLORIDE IN 0.9% SODIUM CHLORIDE 5.61 MICROGRAM(S)/KG/HR: 4 INJECTION INTRAVENOUS at 03:53

## 2019-02-24 RX ADMIN — CHLORHEXIDINE GLUCONATE 15 MILLILITER(S): 213 SOLUTION TOPICAL at 05:32

## 2019-02-24 RX ADMIN — HEPARIN SODIUM 7500 UNIT(S): 5000 INJECTION INTRAVENOUS; SUBCUTANEOUS at 16:00

## 2019-02-24 RX ADMIN — Medication 8: at 12:14

## 2019-02-24 RX ADMIN — SODIUM CHLORIDE 4 MILLILITER(S): 9 INJECTION INTRAMUSCULAR; INTRAVENOUS; SUBCUTANEOUS at 22:46

## 2019-02-24 RX ADMIN — Medication 2: at 17:26

## 2019-02-24 RX ADMIN — Medication 4: at 00:19

## 2019-02-24 RX ADMIN — ERTAPENEM SODIUM 120 MILLIGRAM(S): 1 INJECTION, POWDER, LYOPHILIZED, FOR SOLUTION INTRAMUSCULAR; INTRAVENOUS at 05:03

## 2019-02-24 RX ADMIN — Medication 40 MILLIGRAM(S): at 06:09

## 2019-02-24 RX ADMIN — Medication 1 APPLICATION(S): at 17:26

## 2019-02-24 RX ADMIN — MIDAZOLAM HYDROCHLORIDE 3 MILLIGRAM(S): 1 INJECTION, SOLUTION INTRAMUSCULAR; INTRAVENOUS at 14:55

## 2019-02-24 RX ADMIN — Medication 10 MILLIGRAM(S): at 05:31

## 2019-02-24 RX ADMIN — Medication 20 MILLIGRAM(S): at 14:02

## 2019-02-24 RX ADMIN — CHLORHEXIDINE GLUCONATE 1 APPLICATION(S): 213 SOLUTION TOPICAL at 12:17

## 2019-02-24 RX ADMIN — Medication 4: at 06:09

## 2019-02-24 RX ADMIN — Medication 20 MILLIGRAM(S): at 22:41

## 2019-02-24 RX ADMIN — HEPARIN SODIUM 7500 UNIT(S): 5000 INJECTION INTRAVENOUS; SUBCUTANEOUS at 22:42

## 2019-02-24 NOTE — PROGRESS NOTE ADULT - SUBJECTIVE AND OBJECTIVE BOX
Contact info:   Sid Saul MD  pager [phone], please provide 10 digit call back number.   Please note that this patient may be followed by another provider tomorrow.   If no answer or after hours, please contact [phone]    History: Patient still intubated, on steroid.  On pressors, tube feeds    MEDICATIONS  (STANDING):  chlorhexidine 0.12% Liquid 15 milliLiter(s) Swish and Spit every 12 hours  chlorhexidine 4% Liquid 1 Application(s) Topical <User Schedule>  collagenase Ointment 1 Application(s) Topical daily  dexmedetomidine Infusion 0.2 MICROgram(s)/kG/Hr (5.61 mL/Hr) IV Continuous <Continuous>  ertapenem  IVPB 1000 milliGRAM(s) IV Intermittent every 24 hours  fentaNYL   Infusion. 2 MICROgram(s)/kG/Hr (22.44 mL/Hr) IV Continuous <Continuous>  heparin  Injectable 7500 Unit(s) SubCutaneous every 8 hours  influenza   Vaccine 0.5 milliLiter(s) IntraMuscular once  insulin lispro (HumaLOG) corrective regimen sliding scale   SubCutaneous every 6 hours  insulin NPH human recombinant 20 Unit(s) SubCutaneous every 6 hours  levothyroxine Injectable 100 MICROGram(s) IV Push at bedtime  methylPREDNISolone sodium succinate Injectable 10 milliGRAM(s) IV Push daily  norepinephrine Infusion 0.05 MICROgram(s)/kG/Min (5.259 mL/Hr) IV Continuous <Continuous>  pantoprazole  Injectable 40 milliGRAM(s) IV Push daily  petrolatum Ophthalmic Ointment 1 Application(s) Both EYES every 12 hours  sildenafil (REVATIO) 20 milliGRAM(s) Oral three times a day  simvastatin 20 milliGRAM(s) Oral at bedtime  sodium chloride 3%  Inhalation 4 milliLiter(s) Inhalation every 6 hours    MEDICATIONS  (PRN):  acetaminophen    Suspension .. 650 milliGRAM(s) Oral every 6 hours PRN Temp greater or equal to 38C (100.4F), Mild Pain (1 - 3), Moderate Pain (4 - 6)  dextrose 40% Gel 15 Gram(s) Oral once PRN Blood Glucose LESS THAN 70 milliGRAM(s)/deciliter  glucagon  Injectable 1 milliGRAM(s) IntraMuscular once PRN Glucose LESS THAN 70 milligrams/deciliter  glucagon  Injectable 1 milliGRAM(s) IntraMuscular once PRN Glucose LESS THAN 70 milligrams/deciliter      Allergies    No Known Allergies    Intolerances    Seroquel (Other)    Review of Systems:  Unable to obtain     PHYSICAL EXAM:  VITALS: T(C): 37.6 (02-24-19 @ 16:00)  T(F): 99.6 (02-24-19 @ 16:00), Max: 99.9 (02-23-19 @ 20:00)  HR: 84 (02-24-19 @ 17:30) (62 - 103)  BP: 846/73 (02-24-19 @ 17:30) (62/41 - 846/73)  RR:  (19 - 28)  SpO2:  (91% - 100%)  Wt(kg): --  GENERAL: remains acutely ill  GI: Soft, nontender, non distended  PSYCH: Pt is intubated, and sedated medically    POCT Blood Glucose.: 168 mg/dL (02-24-19 @ 17:10)  POCT Blood Glucose.: 308 mg/dL (02-24-19 @ 12:09)  POCT Blood Glucose.: 227 mg/dL (02-24-19 @ 06:06)  POCT Blood Glucose.: 221 mg/dL (02-24-19 @ 00:15)  POCT Blood Glucose.: 180 mg/dL (02-23-19 @ 17:56)  POCT Blood Glucose.: 313 mg/dL (02-23-19 @ 11:58)  POCT Blood Glucose.: 163 mg/dL (02-23-19 @ 06:09)  POCT Blood Glucose.: 137 mg/dL (02-23-19 @ 00:23)  POCT Blood Glucose.: 146 mg/dL (02-22-19 @ 17:42)  POCT Blood Glucose.: 321 mg/dL (02-22-19 @ 13:15)  POCT Blood Glucose.: 130 mg/dL (02-22-19 @ 05:44)  POCT Blood Glucose.: 112 mg/dL (02-21-19 @ 23:58)  POCT Blood Glucose.: 129 mg/dL (02-21-19 @ 17:57)      02-24    139  |  92<L>  |  52<H>  ----------------------------<  239<H>  3.9   |  26  |  1.24    EGFR if : 54  EGFR if non : 47    Ca    10.3      02-24  Mg     2.2     02-24  Phos  5.6     02-24    TPro  7.7  /  Alb  3.6  /  TBili  0.5  /  DBili  x   /  AST  31  /  ALT  27  /  AlkPhos  167<H>  02-24        Thyroid Function Tests:      Hemoglobin A1C, Whole Blood: 7.1 % <H> [4.0 - 5.6] (12-17-18 @ 03:50)  Hemoglobin A1C, Whole Blood: 7.3 % <H> [4.0 - 5.6] (12-16-18 @ 04:17)

## 2019-02-24 NOTE — PROCEDURE NOTE - NSANESTHESIA_GEN_A_CORE
1% lidocaine
topical anesthetic/2% lidocaine
1% lidocaine
no anesthesia administered
1% lidocaine
no anesthesia administered

## 2019-02-24 NOTE — PROCEDURE NOTE - NSSITEPREP_SKIN_A_CORE
chlorhexidine
Adherence to aseptic technique: hand hygiene prior to donning barriers (gown, gloves), don cap and mask, sterile drape over patient
chlorhexidine
chlorhexidine/Adherence to aseptic technique: hand hygiene prior to donning barriers (gown, gloves), don cap and mask, sterile drape over patient
chlorhexidine/Adherence to aseptic technique: hand hygiene prior to donning barriers (gown, gloves), don cap and mask, sterile drape over patient

## 2019-02-24 NOTE — PROGRESS NOTE ADULT - PROBLEM SELECTOR PLAN 1
BG elevated above goal of 140-180 mg/dl.- Steroids increased   Consider increasing NPH to 20 U q 6 hours (hold if feeds are held) and continue moderate dose humalog correctional scale q6h.

## 2019-02-24 NOTE — PROGRESS NOTE ADULT - ASSESSMENT
*In Progress*   63 year old female with PMHx of HTN, DM, hypothyroidism, RA (prednisone 10mg), pulmonary HTN, CHRIS, and COPD (3L at home) who was originally admitted on 12/15 for SBO s/p ex lap with lysis of adhesions (4 retention sutures) c/b 12/30 she eviscerated 2/2 a coughing episode s/p ex lap (6 retention sutures) c/b multifocal pneumonia, developed hypercapnic respiratory failure requiring intubation 1/6, s/p bronch 1/9/18, and extubated on 1/24, course further c/b Enterococci bacteremia now readmitted to MICU for hypoxic respiratory failure 2/2 atelectasis requiring intubation 01/31. Overnight had grown ESBL in urine, now on ertapenem and levophed.     #Neuro:   - Intubated, comfortable appearing, alert, following commands.  - Currently on fentanyl and precedex on low doses    # Skin: L SC line 1/31    #GI  - SBO s/p ex-lap with findings of healthy bowel c/b evisceration of bowel requiring placement of retention sutures  - NPO with tube feeds   - Continue Protonix    #Renal/Metabolic  - Monitor for electrolyte derangements in the setting of diuresis; replete as needed  - trend BMP for BUN/Cr  - Will closely monitor Na with diuresis. Holding free water to maintain net negative.     #Endocrine  1) Diabetes Mellitus Type 2  - c/w NPH  - Gabapentin 400 TID held for now   - Endocrinology recommendations appreciated    2) Hypothyroidism   - c/w 100 mcg levothyroxine IV     3.) Rheumatoid arthritis:   - Continue solumedrol 10 daily, however monitor FSGs closely for hyperglycemia    # Volume status:   - Overall net negative  - Stopping free water flushes, goal net negative 1-2 L daily  - Continue lasix and metolazone     # Heme:  - Hemoglobin improved, likely from blood draws and bone marrow suppression  - s/p 1 unit pRBCs (2/20)  - WBC ct uptrending today    #DVT ppx  - Heparin 7500 SQ TID    #ID   - Continue ertapenem daily (day 5 of 10), increasing duration due to concern of low grade fever and increasing white count   - Off previous antibiotics as per discussion w/ ID - had 27 days of Vanc, 3 days of Ceftriaxone, and 8 days of Zosyn for enterococcal bacteremia and Klebsiella sputum culture   - Urine cx with ESBL 10-50k colonies and candida, potentially colonized  - C diff negative    #Cardiovascular  1) Previously reported to have cardiogenic shock. Overloaded currently, not requiring inotropes or pressors.  - Overall net negative 2.4 L in last 24 hours   - Continue to hold home blood pressure medications  - c/w Simvastatin 20mg   - c/w lasix 80 Q8 with metolazone daily    #Respiratory   1) Hypoxic Respiratory Failure 2/2 atelectasis requiring intubation 1/31.   - Concern for prolonged intubation and deconditioning. Has had prolonged hospital course with multiple intubations. Previous bronchoscopy w/ findings concerning for hyperdynamic airway collapse.   - Wean PEEP; SBT  - C/w sildenafil 20 TID, now off NO.   - Continue solumedrol 10 mg daily   - Will likely need tracheostomy  - Ethics consult appreciated.     # Ethics:   - [facility] discussion ongoing with Palliative Care team. Pending reconsult.   - Will need further discussions with family including son (HCP) and daughter coming in, regarding potential tracheostomy with ethics assistance  - Complicated family dynamic.   - [facility]. Curtis Pisano plans to obtain accepting physician. Once accepting physician is obtained, primary team will need to call transfer center per policy.   - Curtis Pisano requests that no information be given to patient without himself present. *In progress*    63 year old female with PMHx of HTN, DM, hypothyroidism, RA (prednisone 10mg), pulmonary HTN, CHRIS, and COPD (3L at home) who was originally admitted on 12/15 for SBO s/p ex lap with lysis of adhesions (4 retention sutures) c/b 12/30 she eviscerated 2/2 a coughing episode s/p ex lap (6 retention sutures) c/b multifocal pneumonia, developed hypercapnic respiratory failure requiring intubation 1/6, s/p bronch 1/9/18, and extubated on 1/24, course further c/b Enterococci bacteremia now readmitted to MICU for hypoxic respiratory failure 2/2 atelectasis requiring intubation 01/31. Overnight had grown ESBL in urine, now on ertapenem and levophed.     #Neuro:   - Intubated, comfortable appearing, alert, following commands.  - Currently on fentanyl and precedex on low doses    # Skin: L SC line 1/31    #GI  - SBO s/p ex-lap with findings of healthy bowel c/b evisceration of bowel requiring placement of retention sutures  - NPO with tube feeds   - Continue Protonix    #Renal/Metabolic  - Monitor for electrolyte derangements in the setting of diuresis; replete as needed  - trend BMP for BUN/Cr  - Will closely monitor Na with diuresis. Holding free water to maintain net negative.     #Endocrine  1) Diabetes Mellitus Type 2  - c/w NPH  - Gabapentin 400 TID held for now   - Endocrinology recommendations appreciated    2) Hypothyroidism   - c/w 100 mcg levothyroxine IV     3.) Rheumatoid arthritis:   - Continue solumedrol 10 daily, however monitor FSGs closely for hyperglycemia    # Volume status:   - Overall net negative  - Stopping free water flushes, goal net negative 1-2 L daily  - Continue lasix and metolazone     # Heme:  - Hemoglobin improved, likely from blood draws and bone marrow suppression  - s/p 1 unit pRBCs (2/20)  - WBC ct uptrending today    #DVT ppx  - Heparin 7500 SQ TID    #ID   - Continue ertapenem daily (day 5 of 10), increasing duration due to concern of low grade fever and increasing white count   - Off previous antibiotics as per discussion w/ ID - had 27 days of Vanc, 3 days of Ceftriaxone, and 8 days of Zosyn for enterococcal bacteremia and Klebsiella sputum culture   - Urine cx with ESBL 10-50k colonies and candida, potentially colonized  - C diff negative    #Cardiovascular  1) Previously reported to have cardiogenic shock. Overloaded currently, not requiring inotropes or pressors.  - Overall net negative 2.4 L in last 24 hours   - Continue to hold home blood pressure medications  - c/w Simvastatin 20mg   - c/w lasix 80 Q8 with metolazone daily    #Respiratory   1) Hypoxic Respiratory Failure 2/2 atelectasis requiring intubation 1/31.   - Concern for prolonged intubation and deconditioning. Has had prolonged hospital course with multiple intubations. Previous bronchoscopy w/ findings concerning for hyperdynamic airway collapse.   - Wean PEEP; SBT  - C/w sildenafil 20 TID, now off NO.   - Continue solumedrol 10 mg daily   - Will likely need tracheostomy  - Ethics consult appreciated.     # Ethics:   - [facility] discussion ongoing with Palliative Care team. Pending reconsult.   - Will need further discussions with family including son (HCP) and daughter coming in, regarding potential tracheostomy with ethics assistance  - Complicated family dynamic.   - [facility] Curtis Pisano plans to obtain accepting physician. Once accepting physician is obtained, primary team will need to call transfer center per policy.   - Curtis Pisano requests that no information be given to patient without himself present. 63 year old female with PMHx of HTN, DM, hypothyroidism, RA (prednisone 10mg), pulmonary HTN, CHRIS, and COPD (3L at home) who was originally admitted on 12/15 for SBO s/p ex lap with lysis of adhesions (4 retention sutures) c/b 12/30 she eviscerated 2/2 a coughing episode s/p ex lap (6 retention sutures) c/b multifocal pneumonia, developed hypercapnic respiratory failure requiring intubation 1/6, s/p bronch 1/9/18, and extubated on 1/24, course further c/b Enterococci bacteremia now readmitted to MICU for hypoxic respiratory failure 2/2 atelectasis requiring intubation 01/31. Had grown ESBL in urine, now on ertapenem and levophed.     #Neuro:   - Intubated, comfortable appearing, alert, following commands.  - Currently on fentanyl and precedex on low doses    # Skin: L SC line 1/31  - Plan to change line given fever and hypotension    #GI  - SBO s/p ex-lap with findings of healthy bowel c/b evisceration of bowel requiring placement of retention sutures  - NPO with tube feeds   - Continue Protonix    #Renal/Metabolic  - Monitor for electrolyte derangements in the setting of diuresis; replete as needed  - trend BMP for BUN/Cr  - Will closely monitor Na with diuresis. Holding lasix and metolazone given hypotension. Will need to readdress tomorrow. Goal net even. Overall net positive in last 24 hours.    #Endocrine  1) Diabetes Mellitus Type 2  - c/w NPH at 20 Q6, adjust as needed, previously on 24 units  - Gabapentin 400 TID held for now   - Endocrinology recommendations appreciated    2) Hypothyroidism   - c/w 100 mcg levothyroxine IV     3.) Rheumatoid arthritis:   - Continue solumedrol 10 daily, however monitor FSGs closely for hyperglycemia    # Volume status:   - Overall net negative  - Given hypotension, goal for net even to positive, holding lasix 40 Q8 and metolazone  - Continue lasix and metolazone     # Heme:  - Hemoglobin improved, likely from blood draws and bone marrow suppression  - s/p 1 unit pRBCs (2/20)  - WBC ct uptrending today    #DVT ppx  - Heparin 7500 SQ TID    #ID   - Continue ertapenem daily (started 2/18, day 7) concern for low grade fever, increasing white count and norepinephrine requirement  - Blood cultures sent 48 hours ago, resending sputum culture, switching central line  - Had exchanged iraheta when initially had ESBL about 1 week ago  - Off previous antibiotics as per discussion w/ ID - had 27 days of Vanc, 3 days of Ceftriaxone, and 8 days of Zosyn for enterococcal bacteremia and Klebsiella sputum culture   - Urine cx with ESBL 10-50k colonies and candida, potentially colonized  - C diff negative    #Cardiovascular  1) Previously reported to have cardiogenic shock. Overloaded currently  - Overall net positive in last 24 hours given hypotension  - Continue to hold home blood pressure medications  - c/w Simvastatin 20mg   - c/w lasix 80 Q8 with metolazone daily    #Respiratory   1) Hypoxic Respiratory Failure 2/2 atelectasis requiring intubation 1/31.   - Concern for prolonged intubation and deconditioning. Has had prolonged hospital course with multiple intubations. Previous bronchoscopy w/ findings concerning for hyperdynamic airway collapse.   - Wean PEEP; SBT  - C/w sildenafil 20 TID, now off NO.   - Continue solumedrol 10 mg daily   - Will likely need tracheostomy  - Ethics consult appreciated.     # Ethics:   - [facility] discussion ongoing with Palliative Care team. Pending reconsult.   - Will need further discussions with family including son (HCP) and daughter coming in, regarding potential tracheostomy with ethics assistance  - Complicated family dynamic.   - [facility]. Curtis Pisano plans to obtain accepting physician. Once accepting physician is obtained, primary team will need to call transfer center per policy.   - Curtis Pisano requests that no information be given to patient without himself present.

## 2019-02-24 NOTE — PROCEDURE NOTE - NSPERFORMEDBY_GEN_A_CORE
Myself
Myself/Resident
Myself
Other/Dr. Bear
Other/Myself/Dr. Colby Smith
Myself
Myself

## 2019-02-24 NOTE — PROCEDURE NOTE - PROCEDURE
<<-----Click on this checkbox to enter Procedure Central line placement  02/24/2019    Active  [id number]

## 2019-02-24 NOTE — PROGRESS NOTE ADULT - ATTENDING COMMENTS
Prolonged respiratory failure, unable to extubate given hypoxemia, poor mental status, and severe critical illness polyneuropathy. Attempted CPAP today with no success. Remains on 50%. Continue lung protective ventilation. Continue ertapenem for ESBL E.coli UTI. Currently with fevers, unclear etiology. Follow-up cultures. Will switch central lines as the current line is several weeks old and may be the source of her fevers. Family still wishes transfer to outside facility which we can assist once family finds accepting physician from an outside facility.    Critical Care Time >45 min.

## 2019-02-24 NOTE — PROCEDURE NOTE - NSPROCNAME_GEN_A_CORE
Arterial Puncture/Cannulation
Arterial Puncture/Cannulation
Central Line Insertion
General
Peripheral Line Insertion
Peripheral Line Insertion
Tracheal Intubation
Gastric Intubation/Gastric Lavage

## 2019-02-24 NOTE — PROGRESS NOTE ADULT - SUBJECTIVE AND OBJECTIVE BOX
CHIEF COMPLAINT:    Interval Events:    REVIEW OF SYSTEMS:  Constitutional:   Eyes:  ENT:  CV:  Resp:  GI:  :  MSK:  Integumentary:  Neurological:  Psychiatric:  Endocrine:  Hematologic/Lymphatic:  Allergic/Immunologic:  [ ] All other systems negative  [ ] Unable to assess ROS because ________    OBJECTIVE:  ICU Vital Signs Last 24 Hrs  T(C): 37.3 (2019 04:00), Max: 38.3 (2019 16:00)  T(F): 99.1 (2019 04:00), Max: 100.9 (2019 16:00)  HR: 93 (2019 07:03) (62 - 116)  BP: 89/61 (2019 07:00) (62/41 - 143/76)  BP(mean): 70 (2019 07:00) (47 - 97)  ABP: --  ABP(mean): --  RR: 28 (2019 07:00) (25 - 29)  SpO2: 93% (2019 07:03) (91% - 100%)    Mode: AC/ CMV (Assist Control/ Continuous Mandatory Ventilation), RR (machine): 28, TV (machine): 350, FiO2: 50, PEEP: 10, MAP: 15, PIP: 25    - @ 07:01  -  02-24 @ 07:00  --------------------------------------------------------  IN: 2080.6 mL / OUT: 1630 mL / NET: 450.6 mL      CAPILLARY BLOOD GLUCOSE      POCT Blood Glucose.: 227 mg/dL (2019 06:06)      PHYSICAL EXAM:  General:   HEENT:   Lymph Nodes:  Neck:   Respiratory:   Cardiovascular:   Abdomen:   Extremities:   Skin:   Neurological:  Psychiatry:    LINES:    HOSPITAL MEDICATIONS:  MEDICATIONS  (STANDING):  chlorhexidine 0.12% Liquid 15 milliLiter(s) Swish and Spit every 12 hours  chlorhexidine 4% Liquid 1 Application(s) Topical <User Schedule>  collagenase Ointment 1 Application(s) Topical daily  dexmedetomidine Infusion 0.2 MICROgram(s)/kG/Hr (5.61 mL/Hr) IV Continuous <Continuous>  dextrose 5%. 1000 milliLiter(s) (50 mL/Hr) IV Continuous <Continuous>  dextrose 50% Injectable 12.5 Gram(s) IV Push once  dextrose 50% Injectable 25 Gram(s) IV Push once  dextrose 50% Injectable 25 Gram(s) IV Push once  ertapenem  IVPB 1000 milliGRAM(s) IV Intermittent every 24 hours  fentaNYL   Infusion. 2 MICROgram(s)/kG/Hr (22.44 mL/Hr) IV Continuous <Continuous>  furosemide   Injectable 40 milliGRAM(s) IV Push every 8 hours  heparin  Injectable 7500 Unit(s) SubCutaneous every 8 hours  influenza   Vaccine 0.5 milliLiter(s) IntraMuscular once  insulin lispro (HumaLOG) corrective regimen sliding scale   SubCutaneous every 6 hours  insulin NPH human recombinant 18 Unit(s) SubCutaneous every 6 hours  levothyroxine Injectable 100 MICROGram(s) IV Push at bedtime  methylPREDNISolone sodium succinate Injectable 10 milliGRAM(s) IV Push daily  metolazone 5 milliGRAM(s) Oral daily  norepinephrine Infusion 0.05 MICROgram(s)/kG/Min (5.259 mL/Hr) IV Continuous <Continuous>  pantoprazole  Injectable 40 milliGRAM(s) IV Push daily  petrolatum Ophthalmic Ointment 1 Application(s) Both EYES every 12 hours  sildenafil (REVATIO) 20 milliGRAM(s) Oral three times a day  simvastatin 20 milliGRAM(s) Oral at bedtime  sodium chloride 3%  Inhalation 4 milliLiter(s) Inhalation every 6 hours    MEDICATIONS  (PRN):  acetaminophen    Suspension .. 650 milliGRAM(s) Oral every 6 hours PRN Temp greater or equal to 38C (100.4F), Mild Pain (1 - 3), Moderate Pain (4 - 6)  dextrose 40% Gel 15 Gram(s) Oral once PRN Blood Glucose LESS THAN 70 milliGRAM(s)/deciliter  glucagon  Injectable 1 milliGRAM(s) IntraMuscular once PRN Glucose LESS THAN 70 milligrams/deciliter  glucagon  Injectable 1 milliGRAM(s) IntraMuscular once PRN Glucose LESS THAN 70 milligrams/deciliter      LABS:                        9.9    17.80 )-----------( 516      ( 2019 02:40 )             34.1     02-24    139  |  92<L>  |  52<H>  ----------------------------<  239<H>  3.9   |  26  |  1.24    Ca    10.3      2019 02:40  Phos  5.6       Mg     2.2         TPro  7.7  /  Alb  3.6  /  TBili  0.5  /  DBili  x   /  AST  31  /  ALT  27  /  AlkPhos  167<H>      PT/INR - ( 2019 02:40 )   PT: 12.7 SEC;   INR: 1.11          PTT - ( 2019 02:40 )  PTT:34.3 SEC  Urinalysis Basic - ( 2019 13:40 )    Color: LIGHT YELLOW / Appearance: CLEAR / S.012 / pH: 7.5  Gluc: NEGATIVE / Ketone: TRACE  / Bili: NEGATIVE / Urobili: NORMAL   Blood: TRACE / Protein: 20 / Nitrite: NEGATIVE   Leuk Esterase: NEGATIVE / RBC: 6-10 / WBC 3-5   Sq Epi: OCC / Non Sq Epi: x / Bacteria: NEGATIVE      Arterial Blood Gas:   @ 17:01  7.53/38/79/33/97.0/8.9  ABG lactate: 2.7        MICROBIOLOGY:     RADIOLOGY:  [ ] Reviewed and interpreted by me    EKG: CHIEF COMPLAINT:    Interval Events: Had low grade temperature with new norepinephrine requirement. Appeared awake during rounds this morning. Remains intubated.     REVIEW OF SYSTEMS:  [x] Unable to assess ROS because intubated    OBJECTIVE:  ICU Vital Signs Last 24 Hrs  T(C): 37.3 (2019 04:00), Max: 38.3 (2019 16:00)  T(F): 99.1 (2019 04:00), Max: 100.9 (2019 16:00)  HR: 93 (2019 07:03) (62 - 116)  BP: 89/61 (2019 07:00) (62/41 - 143/76)  BP(mean): 70 (2019 07:00) (47 - 97)  ABP: --  ABP(mean): --  RR: 28 (2019 07:00) (25 - 29)  SpO2: 93% (2019 07:03) (91% - 100%)    Mode: AC/ CMV (Assist Control/ Continuous Mandatory Ventilation), RR (machine): 28, TV (machine): 350, FiO2: 50, PEEP: 10, MAP: 15, PIP: 25    02-23 @ 07:01  -  02-24 @ 07:00  --------------------------------------------------------  IN: 2080.6 mL / OUT: 1630 mL / NET: 450.6 mL      CAPILLARY BLOOD GLUCOSE      POCT Blood Glucose.: 227 mg/dL (2019 06:06)      PHYSICAL EXAM:  General: NAD, but appears anxious  Respiratory: Limited with isolation stethoscope, regular chest rises with ventilator, remains intubated  Cardiovascular: RRR   Abdomen: soft, NT, clean gauze  Extremities: Continued edema  Skin: L SC line  Neurological: awake, answers questions   Psychiatry: Anxious appearing    LINES:    HOSPITAL MEDICATIONS:  MEDICATIONS  (STANDING):  chlorhexidine 0.12% Liquid 15 milliLiter(s) Swish and Spit every 12 hours  chlorhexidine 4% Liquid 1 Application(s) Topical <User Schedule>  collagenase Ointment 1 Application(s) Topical daily  dexmedetomidine Infusion 0.2 MICROgram(s)/kG/Hr (5.61 mL/Hr) IV Continuous <Continuous>  dextrose 5%. 1000 milliLiter(s) (50 mL/Hr) IV Continuous <Continuous>  dextrose 50% Injectable 12.5 Gram(s) IV Push once  dextrose 50% Injectable 25 Gram(s) IV Push once  dextrose 50% Injectable 25 Gram(s) IV Push once  ertapenem  IVPB 1000 milliGRAM(s) IV Intermittent every 24 hours  fentaNYL   Infusion. 2 MICROgram(s)/kG/Hr (22.44 mL/Hr) IV Continuous <Continuous>  furosemide   Injectable 40 milliGRAM(s) IV Push every 8 hours  heparin  Injectable 7500 Unit(s) SubCutaneous every 8 hours  influenza   Vaccine 0.5 milliLiter(s) IntraMuscular once  insulin lispro (HumaLOG) corrective regimen sliding scale   SubCutaneous every 6 hours  insulin NPH human recombinant 18 Unit(s) SubCutaneous every 6 hours  levothyroxine Injectable 100 MICROGram(s) IV Push at bedtime  methylPREDNISolone sodium succinate Injectable 10 milliGRAM(s) IV Push daily  metolazone 5 milliGRAM(s) Oral daily  norepinephrine Infusion 0.05 MICROgram(s)/kG/Min (5.259 mL/Hr) IV Continuous <Continuous>  pantoprazole  Injectable 40 milliGRAM(s) IV Push daily  petrolatum Ophthalmic Ointment 1 Application(s) Both EYES every 12 hours  sildenafil (REVATIO) 20 milliGRAM(s) Oral three times a day  simvastatin 20 milliGRAM(s) Oral at bedtime  sodium chloride 3%  Inhalation 4 milliLiter(s) Inhalation every 6 hours    MEDICATIONS  (PRN):  acetaminophen    Suspension .. 650 milliGRAM(s) Oral every 6 hours PRN Temp greater or equal to 38C (100.4F), Mild Pain (1 - 3), Moderate Pain (4 - 6)  dextrose 40% Gel 15 Gram(s) Oral once PRN Blood Glucose LESS THAN 70 milliGRAM(s)/deciliter  glucagon  Injectable 1 milliGRAM(s) IntraMuscular once PRN Glucose LESS THAN 70 milligrams/deciliter  glucagon  Injectable 1 milliGRAM(s) IntraMuscular once PRN Glucose LESS THAN 70 milligrams/deciliter      LABS:                        9.9    17.80 )-----------( 516      ( 2019 02:40 )             34.1     02-24    139  |  92<L>  |  52<H>  ----------------------------<  239<H>  3.9   |  26  |  1.24    Ca    10.3      2019 02:40  Phos  5.6       Mg     2.2         TPro  7.7  /  Alb  3.6  /  TBili  0.5  /  DBili  x   /  AST  31  /  ALT  27  /  AlkPhos  167<H>      PT/INR - ( 2019 02:40 )   PT: 12.7 SEC;   INR: 1.11          PTT - ( 2019 02:40 )  PTT:34.3 SEC  Urinalysis Basic - ( 2019 13:40 )    Color: LIGHT YELLOW / Appearance: CLEAR / S.012 / pH: 7.5  Gluc: NEGATIVE / Ketone: TRACE  / Bili: NEGATIVE / Urobili: NORMAL   Blood: TRACE / Protein: 20 / Nitrite: NEGATIVE   Leuk Esterase: NEGATIVE / RBC: 6-10 / WBC 3-5   Sq Epi: OCC / Non Sq Epi: x / Bacteria: NEGATIVE      Arterial Blood Gas:   @ 17:01  7.53/38/79/33/97.0/8.9  ABG lactate: 2.7        MICROBIOLOGY:     RADIOLOGY:  [ ] Reviewed and interpreted by me    EKG:

## 2019-02-24 NOTE — PROCEDURE NOTE - NSPROCDETAILS_GEN_ALL_CORE
placement confirmed by auscultation
connected to ventilator/patient pre-oxygenated, tube inserted, placement confirmed
guidewire recovered
flushes easily/location identified, draped/prepped, sterile technique used/blood seen on insertion/dressing applied/secured in place/sterile technique, catheter placed/ultrasound utilization
sterile technique, catheter placed/blood seen on insertion/dressing applied/flushes easily/secured in place/ultrasound utilization/location identified, draped/prepped, sterile technique used
sterile technique, catheter placed/lumen(s) aspirated and flushed/sterile dressing applied/ultrasound guidance/guidewire recovered
sutured in place/ultrasound guidance
positive blood return obtained via catheter/hemostasis with direct pressure, dressing applied/Seldinger technique/ultrasound guidance/location identified, draped/prepped, sterile technique used, needle inserted/introduced/connected to a pressurized flush line/all materials/supplies accounted for at end of procedure/catheter exchanged over guide wire/sutured in place
sterile technique, catheter placed/guidewire recovered/sterile dressing applied/lumen(s) aspirated and flushed/ultrasound guidance
ultrasound guidance/sterile technique, catheter placed/guidewire recovered/lumen(s) aspirated and flushed/sterile dressing applied

## 2019-02-24 NOTE — PROCEDURE NOTE - NSINDICATIONS_GEN_A_CORE
feeds
critical illness
critical illness
airway protection/respiratory failure
antibiotic therapy/emergency venous access/other
arterial puncture to obtain ABG's/critical patient
other/antibiotic therapy/emergency venous access
arterial puncture to obtain ABG's/monitoring purposes
critical illness
emergency venous access/critical illness

## 2019-02-24 NOTE — PROCEDURE NOTE - NSINFORMCONSENT_GEN_A_CORE
This was an emergent procedure.
Benefits, risks, and possible complications of procedure explained to patient/caregiver who verbalized understanding and gave verbal consent.
Benefits, risks, and possible complications of procedure explained to patient/caregiver who verbalized understanding and gave written consent.
This was an emergent procedure.
This was an emergent procedure.
Benefits, risks, and possible complications of procedure explained to patient/caregiver who verbalized understanding and gave verbal consent.
Benefits, risks, and possible complications of procedure explained to patient/caregiver who verbalized understanding and gave verbal consent.
This was an emergent procedure.

## 2019-02-24 NOTE — PROCEDURE NOTE - NSASSISTBY_GEN_A_CORE
Myself
Myself
Attending
Attending
Resident
Dr Smith/Resident
Myself
Resident/Lisa
Myself

## 2019-02-24 NOTE — PROCEDURE NOTE - NSPOSTCAREGUIDE_GEN_A_CORE
Care for catheter as per unit/ICU protocols
Care for catheter as per unit/ICU protocols
Verbal/written post procedure instructions were given to patient/caregiver
Care for catheter as per unit/ICU protocols

## 2019-02-24 NOTE — PROCEDURE NOTE - PROCEDURE DATE TIME, MLM
06-Jan-2019
09-Jan-2019 11:33
13-Jan-2019 17:20
17-Dec-2018 12:00
31-Jan-2019 17:00
31-Jan-2019 20:35
06-Jan-2019 12:39
09-Jan-2019 16:30
11-Jan-2019 11:30
24-Feb-2019 16:00
29-Jan-2019 20:30
29-Jan-2019 21:00
31-Jan-2019 20:39

## 2019-02-24 NOTE — PROCEDURE NOTE - NSCHLORHEXIDINEBATH_GEN_A_CORE
4% solution
To be discontinued when line removed/2% wipes
4% solution/To be discontinued when line removed

## 2019-02-24 NOTE — PROCEDURE NOTE - NSTOLERANCE_GEN_A_CORE
Patient tolerated procedure well.

## 2019-02-25 LAB
ALBUMIN SERPL ELPH-MCNC: 3.6 G/DL — SIGNIFICANT CHANGE UP (ref 3.3–5)
ALP SERPL-CCNC: 145 U/L — HIGH (ref 40–120)
ALT FLD-CCNC: 23 U/L — SIGNIFICANT CHANGE UP (ref 4–33)
ANION GAP SERPL CALC-SCNC: 21 MMO/L — HIGH (ref 7–14)
AST SERPL-CCNC: 34 U/L — HIGH (ref 4–32)
BACTERIA SPT RESP CULT: SIGNIFICANT CHANGE UP
BASOPHILS # BLD AUTO: 0.13 K/UL — SIGNIFICANT CHANGE UP (ref 0–0.2)
BASOPHILS NFR BLD AUTO: 0.9 % — SIGNIFICANT CHANGE UP (ref 0–2)
BILIRUB SERPL-MCNC: 0.5 MG/DL — SIGNIFICANT CHANGE UP (ref 0.2–1.2)
BUN SERPL-MCNC: 43 MG/DL — HIGH (ref 7–23)
CALCIUM SERPL-MCNC: 10.1 MG/DL — SIGNIFICANT CHANGE UP (ref 8.4–10.5)
CHLORIDE SERPL-SCNC: 94 MMOL/L — LOW (ref 98–107)
CO2 SERPL-SCNC: 28 MMOL/L — SIGNIFICANT CHANGE UP (ref 22–31)
CREAT SERPL-MCNC: 0.94 MG/DL — SIGNIFICANT CHANGE UP (ref 0.5–1.3)
EOSINOPHIL # BLD AUTO: 0.88 K/UL — HIGH (ref 0–0.5)
EOSINOPHIL NFR BLD AUTO: 6.1 % — HIGH (ref 0–6)
GLUCOSE BLDC GLUCOMTR-MCNC: 118 MG/DL — HIGH (ref 70–99)
GLUCOSE BLDC GLUCOMTR-MCNC: 148 MG/DL — HIGH (ref 70–99)
GLUCOSE BLDC GLUCOMTR-MCNC: 191 MG/DL — HIGH (ref 70–99)
GLUCOSE BLDC GLUCOMTR-MCNC: 207 MG/DL — HIGH (ref 70–99)
GLUCOSE BLDC GLUCOMTR-MCNC: 366 MG/DL — HIGH (ref 70–99)
GLUCOSE SERPL-MCNC: 174 MG/DL — HIGH (ref 70–99)
HCT VFR BLD CALC: 32.1 % — LOW (ref 34.5–45)
HGB BLD-MCNC: 9.2 G/DL — LOW (ref 11.5–15.5)
IMM GRANULOCYTES NFR BLD AUTO: 0.9 % — SIGNIFICANT CHANGE UP (ref 0–1.5)
LYMPHOCYTES # BLD AUTO: 18.4 % — SIGNIFICANT CHANGE UP (ref 13–44)
LYMPHOCYTES # BLD AUTO: 2.63 K/UL — SIGNIFICANT CHANGE UP (ref 1–3.3)
MAGNESIUM SERPL-MCNC: 2.1 MG/DL — SIGNIFICANT CHANGE UP (ref 1.6–2.6)
MCHC RBC-ENTMCNC: 28.7 % — LOW (ref 32–36)
MCHC RBC-ENTMCNC: 29.4 PG — SIGNIFICANT CHANGE UP (ref 27–34)
MCV RBC AUTO: 102.6 FL — HIGH (ref 80–100)
MONOCYTES # BLD AUTO: 1.07 K/UL — HIGH (ref 0–0.9)
MONOCYTES NFR BLD AUTO: 7.5 % — SIGNIFICANT CHANGE UP (ref 2–14)
NEUTROPHILS # BLD AUTO: 9.47 K/UL — HIGH (ref 1.8–7.4)
NEUTROPHILS NFR BLD AUTO: 66.2 % — SIGNIFICANT CHANGE UP (ref 43–77)
NRBC # FLD: 0.08 K/UL — LOW (ref 25–125)
PHOSPHATE SERPL-MCNC: 3.9 MG/DL — SIGNIFICANT CHANGE UP (ref 2.5–4.5)
PLATELET # BLD AUTO: 457 K/UL — HIGH (ref 150–400)
PMV BLD: 9.7 FL — SIGNIFICANT CHANGE UP (ref 7–13)
POTASSIUM SERPL-MCNC: 3.5 MMOL/L — SIGNIFICANT CHANGE UP (ref 3.5–5.3)
POTASSIUM SERPL-SCNC: 3.5 MMOL/L — SIGNIFICANT CHANGE UP (ref 3.5–5.3)
PROT SERPL-MCNC: 7.1 G/DL — SIGNIFICANT CHANGE UP (ref 6–8.3)
RBC # BLD: 3.13 M/UL — LOW (ref 3.8–5.2)
RBC # FLD: 19.7 % — HIGH (ref 10.3–14.5)
SODIUM SERPL-SCNC: 143 MMOL/L — SIGNIFICANT CHANGE UP (ref 135–145)
WBC # BLD: 14.31 K/UL — HIGH (ref 3.8–10.5)
WBC # FLD AUTO: 14.31 K/UL — HIGH (ref 3.8–10.5)

## 2019-02-25 PROCEDURE — 99291 CRITICAL CARE FIRST HOUR: CPT

## 2019-02-25 PROCEDURE — 99232 SBSQ HOSP IP/OBS MODERATE 35: CPT

## 2019-02-25 RX ORDER — HUMAN INSULIN 100 [IU]/ML
24 INJECTION, SUSPENSION SUBCUTANEOUS
Qty: 0 | Refills: 0 | Status: DISCONTINUED | OUTPATIENT
Start: 2019-02-25 | End: 2019-02-25

## 2019-02-25 RX ORDER — INSULIN GLARGINE 100 [IU]/ML
15 INJECTION, SOLUTION SUBCUTANEOUS AT BEDTIME
Qty: 0 | Refills: 0 | Status: DISCONTINUED | OUTPATIENT
Start: 2019-02-25 | End: 2019-02-26

## 2019-02-25 RX ORDER — INSULIN GLARGINE 100 [IU]/ML
25 INJECTION, SOLUTION SUBCUTANEOUS AT BEDTIME
Qty: 0 | Refills: 0 | Status: DISCONTINUED | OUTPATIENT
Start: 2019-02-25 | End: 2019-02-25

## 2019-02-25 RX ORDER — HUMAN INSULIN 100 [IU]/ML
21 INJECTION, SUSPENSION SUBCUTANEOUS
Qty: 0 | Refills: 0 | Status: DISCONTINUED | OUTPATIENT
Start: 2019-02-25 | End: 2019-02-25

## 2019-02-25 RX ADMIN — INSULIN GLARGINE 15 UNIT(S): 100 INJECTION, SOLUTION SUBCUTANEOUS at 23:10

## 2019-02-25 RX ADMIN — SODIUM CHLORIDE 4 MILLILITER(S): 9 INJECTION INTRAMUSCULAR; INTRAVENOUS; SUBCUTANEOUS at 03:25

## 2019-02-25 RX ADMIN — Medication 100 MICROGRAM(S): at 01:02

## 2019-02-25 RX ADMIN — CHLORHEXIDINE GLUCONATE 15 MILLILITER(S): 213 SOLUTION TOPICAL at 05:48

## 2019-02-25 RX ADMIN — Medication 100 MICROGRAM(S): at 23:10

## 2019-02-25 RX ADMIN — PANTOPRAZOLE SODIUM 40 MILLIGRAM(S): 20 TABLET, DELAYED RELEASE ORAL at 12:22

## 2019-02-25 RX ADMIN — Medication 1 APPLICATION(S): at 05:48

## 2019-02-25 RX ADMIN — HUMAN INSULIN 20 UNIT(S): 100 INJECTION, SUSPENSION SUBCUTANEOUS at 00:22

## 2019-02-25 RX ADMIN — DEXMEDETOMIDINE HYDROCHLORIDE IN 0.9% SODIUM CHLORIDE 5.61 MICROGRAM(S)/KG/HR: 4 INJECTION INTRAVENOUS at 12:22

## 2019-02-25 RX ADMIN — Medication 10 MILLIGRAM(S): at 05:50

## 2019-02-25 RX ADMIN — CHLORHEXIDINE GLUCONATE 1 APPLICATION(S): 213 SOLUTION TOPICAL at 12:22

## 2019-02-25 RX ADMIN — HEPARIN SODIUM 7500 UNIT(S): 5000 INJECTION INTRAVENOUS; SUBCUTANEOUS at 05:49

## 2019-02-25 RX ADMIN — HEPARIN SODIUM 7500 UNIT(S): 5000 INJECTION INTRAVENOUS; SUBCUTANEOUS at 13:58

## 2019-02-25 RX ADMIN — Medication 1 APPLICATION(S): at 12:23

## 2019-02-25 RX ADMIN — Medication 10: at 12:22

## 2019-02-25 RX ADMIN — Medication 2: at 05:50

## 2019-02-25 RX ADMIN — ERTAPENEM SODIUM 120 MILLIGRAM(S): 1 INJECTION, POWDER, LYOPHILIZED, FOR SOLUTION INTRAMUSCULAR; INTRAVENOUS at 05:48

## 2019-02-25 RX ADMIN — HUMAN INSULIN 20 UNIT(S): 100 INJECTION, SUSPENSION SUBCUTANEOUS at 05:50

## 2019-02-25 RX ADMIN — SODIUM CHLORIDE 4 MILLILITER(S): 9 INJECTION INTRAMUSCULAR; INTRAVENOUS; SUBCUTANEOUS at 10:27

## 2019-02-25 RX ADMIN — HUMAN INSULIN 20 UNIT(S): 100 INJECTION, SUSPENSION SUBCUTANEOUS at 12:23

## 2019-02-25 RX ADMIN — SIMVASTATIN 20 MILLIGRAM(S): 20 TABLET, FILM COATED ORAL at 01:01

## 2019-02-25 RX ADMIN — Medication 20 MILLIGRAM(S): at 05:48

## 2019-02-25 RX ADMIN — Medication 4: at 18:26

## 2019-02-25 RX ADMIN — FENTANYL CITRATE 22.44 MICROGRAM(S)/KG/HR: 50 INJECTION INTRAVENOUS at 18:27

## 2019-02-25 RX ADMIN — Medication 5.26 MICROGRAM(S)/KG/MIN: at 18:27

## 2019-02-25 RX ADMIN — HEPARIN SODIUM 7500 UNIT(S): 5000 INJECTION INTRAVENOUS; SUBCUTANEOUS at 23:10

## 2019-02-25 NOTE — PROGRESS NOTE ADULT - ASSESSMENT
63 year old female with PMHx of HTN, DM, hypothyroidism, RA (prednisone 10mg), pulmonary HTN, CHRIS, and COPD (3L at home) who was originally admitted on 12/15 for SBO s/p ex lap with lysis of adhesions (4 retention sutures) c/b 12/30 she eviscerated 2/2 a coughing episode s/p ex lap (6 retention sutures) c/b multifocal pneumonia, developed hypercapnic respiratory failure requiring intubation 1/6, s/p bronch 1/9/18, and extubated on 1/24, course further c/b Enterococci bacteremia now readmitted to MICU for hypoxic respiratory failure 2/2 atelectasis requiring intubation 01/31. Had grown ESBL in urine, now on ertapenem and levophed.     #Neuro:   - Intubated, comfortable appearing, alert, following commands.  - Currently on fentanyl and precedex on low doses    # Skin: L SC line 1/31  - Plan to change line given fever and hypotension    #GI  - SBO s/p ex-lap with findings of healthy bowel c/b evisceration of bowel requiring placement of retention sutures  - NPO with tube feeds   - Continue Protonix    #Renal/Metabolic  - Monitor for electrolyte derangements in the setting of diuresis; replete as needed  - trend BMP for BUN/Cr  - Will closely monitor Na with diuresis. Holding lasix and metolazone given hypotension. Will need to readdress tomorrow. Goal net even. Overall net positive in last 24 hours.    #Endocrine  1) Diabetes Mellitus Type 2  - c/w NPH at 20 Q6, adjust as needed, previously on 24 units  - Gabapentin 400 TID held for now   - Endocrinology recommendations appreciated    2) Hypothyroidism   - c/w 100 mcg levothyroxine IV     3.) Rheumatoid arthritis:   - Continue solumedrol 10 daily, however monitor FSGs closely for hyperglycemia    # Volume status:   - Overall net negative  - Given hypotension, goal for net even to positive, holding lasix 40 Q8 and metolazone  - Continue lasix and metolazone     # Heme:  - Hemoglobin improved, likely from blood draws and bone marrow suppression  - s/p 1 unit pRBCs (2/20)  - WBC ct uptrending today    #DVT ppx  - Heparin 7500 SQ TID    #ID   - Continue ertapenem daily (started 2/18, day 7) concern for low grade fever, increasing white count and norepinephrine requirement  - Blood cultures sent 48 hours ago, resending sputum culture, switching central line  - Had exchanged iraheta when initially had ESBL about 1 week ago  - Off previous antibiotics as per discussion w/ ID - had 27 days of Vanc, 3 days of Ceftriaxone, and 8 days of Zosyn for enterococcal bacteremia and Klebsiella sputum culture   - Urine cx with ESBL 10-50k colonies and candida, potentially colonized  - C diff negative    #Cardiovascular  1) Previously reported to have cardiogenic shock. Overloaded currently  - Overall net positive in last 24 hours given hypotension  - Continue to hold home blood pressure medications  - c/w Simvastatin 20mg   - c/w lasix 80 Q8 with metolazone daily    #Respiratory   1) Hypoxic Respiratory Failure 2/2 atelectasis requiring intubation 1/31.   - Concern for prolonged intubation and deconditioning. Has had prolonged hospital course with multiple intubations. Previous bronchoscopy w/ findings concerning for hyperdynamic airway collapse.   - Wean PEEP; SBT  - C/w sildenafil 20 TID, now off NO.   - Continue solumedrol 10 mg daily   - Will likely need tracheostomy  - Ethics consult appreciated.     # Ethics:   - [facility] discussion ongoing with Palliative Care team. Pending reconsult.   - Will need further discussions with family including son (HCP) and daughter coming in, regarding potential tracheostomy with ethics assistance  - Complicated family dynamic.   - Family requesting transfer to [facility] for second opinion. Curtis Pisano plans to obtain accepting physician. Once accepting physician is obtained, primary team will need to call transfer center per policy.   - Curtis Pisano requests that no information be given to patient without himself present. 63 year old female with PMHx of HTN, DM, hypothyroidism, RA (prednisone 10mg), pulmonary HTN, CHRIS, and COPD (3L at home) who was originally admitted on 12/15 for SBO s/p ex lap with lysis of adhesions (4 retention sutures) c/b 12/30 she eviscerated 2/2 a coughing episode s/p ex lap (6 retention sutures) c/b multifocal pneumonia, developed hypercapnic respiratory failure requiring intubation 1/6, s/p bronch 1/9/18, and extubated on 1/24, course further c/b Enterococci bacteremia now readmitted to MICU for hypoxic respiratory failure 2/2 atelectasis requiring intubation 01/31. Had grown ESBL in urine, now on ertapenem and levophed.     #Neuro:   - Intubated, comfortable appearing, alert, following commands.  - Currently on fentanyl and precedex on low doses    # Skin: L SC line 2/24  - Line changed yesterday given fever and hypotension    #GI  - SBO s/p ex-lap with findings of healthy bowel c/b evisceration of bowel requiring placement of retention sutures  - NPO with tube feeds   - Continue Protonix    #Renal/Metabolic  - Monitor for electrolyte derangements in the setting of diuresis; replete as needed  - trend BMP for BUN/Cr  - Will closely monitor Na with diuresis. Holding lasix and metolazone given hypotension. Goal net even. Overall net negative in last 24 hours, despite holding diuresis. Will need to readdress tomorrow if positive fluid balance.     #Endocrine  1) Diabetes Mellitus Type 2  - c/w adjusted NPH per endocrinology recommendations, NPH 21, except for 6 am dose which is increased to 24, may need lantus 25 if extubated successfully   - Gabapentin 400 TID held for now   - Endocrinology recommendations appreciated    2) Hypothyroidism   - c/w 100 mcg levothyroxine IV     3.) Rheumatoid arthritis:   - Continue solumedrol 10 daily, however monitor FSGs closely for hyperglycemia    # Volume status:   - Overall net negative  - Given hypotension, goal for net even to positive, holding lasix 40 Q8 and metolazone  - Continue lasix and metolazone     # Heme:  - Hemoglobin improved, likely from blood draws and bone marrow suppression  - s/p 1 unit pRBCs (2/20)  - WBC ct uptrending today    #DVT ppx  - Heparin 7500 SQ TID    #ID   - Continue ertapenem daily (started 2/18, day 7) concern for low grade fever, increasing white count and norepinephrine requirement  - Blood cultures sent 48 hours ago, resending sputum culture, switching central line  - Had exchanged iraheta when initially had ESBL about 1 week ago  - Off previous antibiotics as per discussion w/ ID - had 27 days of Vanc, 3 days of Ceftriaxone, and 8 days of Zosyn for enterococcal bacteremia and Klebsiella sputum culture   - Urine cx with ESBL 10-50k colonies and candida, potentially colonized  - C diff negative    #Cardiovascular  1) Previously reported to have cardiogenic shock. Overloaded currently  - Overall net positive in last 24 hours given hypotension  - Continue to hold home blood pressure medications  - c/w Simvastatin 20mg   - Holding lasix 40 Q8 with metolazone daily    #Respiratory   1) Hypoxic Respiratory Failure 2/2 atelectasis requiring intubation 1/31.   - Concern for prolonged intubation and deconditioning. Has had prolonged hospital course with multiple intubations. Previous bronchoscopy w/ findings concerning for hyperdynamic airway collapse.   - Wean PEEP; SBT  - C/w sildenafil 20 TID, now off NO.   - Continue solumedrol 10 mg daily   - Attempting extubation trial today  - Ethics consult appreciated.     # Ethics:   - [facility] discussion ongoing with Palliative Care team. Pending reconsult.   - Will need further discussions with family including son (HCP) and daughter coming in, regarding potential tracheostomy with ethics assistance  - Complicated family dynamic.   - Family requesting transfer to [facility] for second opinion. Curtis Pisano plans to obtain accepting physician. Once accepting physician is obtained, primary team will need to call transfer center per policy.   - Curtis Pisano requests that no information be given to patient without himself present.

## 2019-02-25 NOTE — PROGRESS NOTE ADULT - PROBLEM SELECTOR PLAN 1
BG elevated above goal of 140-180 mg/dl.- trending higher at 12 pm FS  Consider increasing NPH to 21 units at 12:00pm, 18:00pm, and 00:00am (hold if feeds are held)   Consider increasing NPH at 6:00am to 24 units daily  and continue moderate dose humalog correctional scale q6h. BG elevated above goal of 140-180 mg/dl.- trending higher at 12 pm FS  Consider increasing NPH to 21 units at 12:00pm, 18:00pm, and 00:00am (hold if feeds are held)   Consider increasing NPH at 6:00am to 24 units daily  and continue moderate dose humalog correctional scale q6h.    ---> if patient is extubated today prior to 6pm NPH dose, tube feeds are stopped, and patient will be NPO, then Would transition nph to basal insulin  plus moderate humalog correction scale.    - Patients TDD is 80 units- so would start Lantus 30 units sq q 8pm tonight.   - Last NPH dose given at 12 pm, therefore, NPH is no longer at peak action   - once patient is eating, can start humalog premeal BG elevated above goal of 140-180 mg/dl.- trending higher at 12 pm FS  Consider increasing NPH to 21 units at 12:00pm, 18:00pm, and 00:00am (hold if feeds are held)   Consider increasing NPH at 6:00am to 24 units daily  and continue moderate dose humalog correctional scale q6h.    ---> if patient is extubated today prior to 6pm NPH dose, tube feeds are stopped, and patient will be NPO, then Would transition nph to basal insulin  plus moderate humalog correction scale.    - Patients TDD is 80 units- so would start Lantus 30 units sq q 8pm tonight.   - Last NPH dose given at 12 pm, therefore, NPH is no longer at peak action   - once patient is eating, can start humalog premeal    ADDENDUM: 1544- discussed case with Dr. Avalos.  Would start lantus 15 units sq q hs if patient is extubated as pateint was on lower doses in past when was NPO.

## 2019-02-25 NOTE — PROGRESS NOTE ADULT - SUBJECTIVE AND OBJECTIVE BOX
CHIEF COMPLAINT:    Interval Events:    REVIEW OF SYSTEMS:  Constitutional:   Eyes:  ENT:  CV:  Resp:  GI:  :  MSK:  Integumentary:  Neurological:  Psychiatric:  Endocrine:  Hematologic/Lymphatic:  Allergic/Immunologic:  [ ] All other systems negative  [ ] Unable to assess ROS because ________    OBJECTIVE:  ICU Vital Signs Last 24 Hrs  T(C): 36.9 (2019 04:00), Max: 37.6 (2019 08:00)  T(F): 98.4 (2019 04:00), Max: 99.6 (2019 08:00)  HR: 81 (2019 07:00) (68 - 103)  BP: 99/64 (2019 07:00) (78/59 - 130/80)  BP(mean): 75 (2019 07:00) (67 - 95)  ABP: --  ABP(mean): --  RR: 24 (2019 07:00) (19 - 28)  SpO2: 94% (:00) (92% - 99%)    Mode: AC/ CMV (Assist Control/ Continuous Mandatory Ventilation), RR (machine): 24, TV (machine): 350, FiO2: 50, PEEP: 10, MAP: 15, PIP: 29     @ 07:01  -  - @ 07:00  --------------------------------------------------------  IN: 1602.4 mL / OUT: 2115 mL / NET: -512.6 mL      CAPILLARY BLOOD GLUCOSE      POCT Blood Glucose.: 191 mg/dL (2019 05:43)      PHYSICAL EXAM:  General:   HEENT:   Lymph Nodes:  Neck:   Respiratory:   Cardiovascular:   Abdomen:   Extremities:   Skin:   Neurological:  Psychiatry:    LINES:    HOSPITAL MEDICATIONS:  MEDICATIONS  (STANDING):  chlorhexidine 0.12% Liquid 15 milliLiter(s) Swish and Spit every 12 hours  chlorhexidine 4% Liquid 1 Application(s) Topical <User Schedule>  collagenase Ointment 1 Application(s) Topical daily  dexmedetomidine Infusion 0.2 MICROgram(s)/kG/Hr (5.61 mL/Hr) IV Continuous <Continuous>  dextrose 5%. 1000 milliLiter(s) (50 mL/Hr) IV Continuous <Continuous>  dextrose 50% Injectable 12.5 Gram(s) IV Push once  dextrose 50% Injectable 25 Gram(s) IV Push once  dextrose 50% Injectable 25 Gram(s) IV Push once  ertapenem  IVPB 1000 milliGRAM(s) IV Intermittent every 24 hours  fentaNYL   Infusion. 2 MICROgram(s)/kG/Hr (22.44 mL/Hr) IV Continuous <Continuous>  heparin  Injectable 7500 Unit(s) SubCutaneous every 8 hours  influenza   Vaccine 0.5 milliLiter(s) IntraMuscular once  insulin lispro (HumaLOG) corrective regimen sliding scale   SubCutaneous every 6 hours  insulin NPH human recombinant 20 Unit(s) SubCutaneous every 6 hours  levothyroxine Injectable 100 MICROGram(s) IV Push at bedtime  methylPREDNISolone sodium succinate Injectable 10 milliGRAM(s) IV Push daily  norepinephrine Infusion 0.05 MICROgram(s)/kG/Min (5.259 mL/Hr) IV Continuous <Continuous>  pantoprazole  Injectable 40 milliGRAM(s) IV Push daily  petrolatum Ophthalmic Ointment 1 Application(s) Both EYES every 12 hours  sildenafil (REVATIO) 20 milliGRAM(s) Oral three times a day  simvastatin 20 milliGRAM(s) Oral at bedtime  sodium chloride 3%  Inhalation 4 milliLiter(s) Inhalation every 6 hours    MEDICATIONS  (PRN):  acetaminophen    Suspension .. 650 milliGRAM(s) Oral every 6 hours PRN Temp greater or equal to 38C (100.4F), Mild Pain (1 - 3), Moderate Pain (4 - 6)  dextrose 40% Gel 15 Gram(s) Oral once PRN Blood Glucose LESS THAN 70 milliGRAM(s)/deciliter  glucagon  Injectable 1 milliGRAM(s) IntraMuscular once PRN Glucose LESS THAN 70 milligrams/deciliter  glucagon  Injectable 1 milliGRAM(s) IntraMuscular once PRN Glucose LESS THAN 70 milligrams/deciliter      LABS:                        9.2    14.31 )-----------( 457      ( 2019 03:00 )             32.1     02-25    143  |  94<L>  |  43<H>  ----------------------------<  174<H>  3.5   |  28  |  0.94    Ca    10.1      2019 03:00  Phos  3.9       Mg     2.1         TPro  7.1  /  Alb  3.6  /  TBili  0.5  /  DBili  x   /  AST  34<H>  /  ALT  23  /  AlkPhos  145<H>      PT/INR - ( 2019 02:40 )   PT: 12.7 SEC;   INR: 1.11          PTT - ( 2019 02:40 )  PTT:34.3 SEC  Urinalysis Basic - ( 2019 12:30 )    Color: LIGHT YELLOW / Appearance: CLEAR / S.013 / pH: 8.0  Gluc: NEGATIVE / Ketone: NEGATIVE  / Bili: NEGATIVE / Urobili: NORMAL   Blood: NEGATIVE / Protein: 30 / Nitrite: NEGATIVE   Leuk Esterase: TRACE / RBC: 3-5 / WBC 11-25   Sq Epi: OCC / Non Sq Epi: x / Bacteria: NEGATIVE      Arterial Blood Gas:   @ 17:01  7.53/38/79/33/97.0/8.9  ABG lactate: 2.7        MICROBIOLOGY:     RADIOLOGY:  [ ] Reviewed and interpreted by me    EKG: CHIEF COMPLAINT:    Interval Events:  Appears awake, alert, however remains intubated on pressors.     REVIEW OF SYSTEMS:  [x] Unable to assess ROS because intubated    OBJECTIVE:  ICU Vital Signs Last 24 Hrs  T(C): 36.9 (2019 04:00), Max: 37.6 (2019 08:00)  T(F): 98.4 (2019 04:00), Max: 99.6 (2019 08:00)  HR: 81 (2019 07:00) (68 - 103)  BP: 99/64 (2019 07:00) (78/59 - 130/80)  BP(mean): 75 (:00) (67 - 95)  ABP: --  ABP(mean): --  RR: 24 (2019 07:00) (19 - 28)  SpO2: 94% (2019 07:00) (92% - 99%)    Mode: AC/ CMV (Assist Control/ Continuous Mandatory Ventilation), RR (machine): 24, TV (machine): 350, FiO2: 50, PEEP: 10, MAP: 15, PIP: 29    02- @ 07:01  -  02-25 @ 07:00  --------------------------------------------------------  IN: 1602.4 mL / OUT: 2115 mL / NET: -512.6 mL      CAPILLARY BLOOD GLUCOSE      POCT Blood Glucose.: 191 mg/dL (2019 05:43)      PHYSICAL EXAM:  General: NAD  HEENT: Intubated  Respiratory: Intubated, clear to auscultation  Cardiovascular: RRR  Abdomen: soft, gauze c/d/i  Extremities: mild dependent swelling  Skin: open abdomen  Neurological: awake, alert, following commands  Psychiatry: calm, less tearful this AM    LINES:    HOSPITAL MEDICATIONS:  MEDICATIONS  (STANDING):  chlorhexidine 0.12% Liquid 15 milliLiter(s) Swish and Spit every 12 hours  chlorhexidine 4% Liquid 1 Application(s) Topical <User Schedule>  collagenase Ointment 1 Application(s) Topical daily  dexmedetomidine Infusion 0.2 MICROgram(s)/kG/Hr (5.61 mL/Hr) IV Continuous <Continuous>  dextrose 5%. 1000 milliLiter(s) (50 mL/Hr) IV Continuous <Continuous>  dextrose 50% Injectable 12.5 Gram(s) IV Push once  dextrose 50% Injectable 25 Gram(s) IV Push once  dextrose 50% Injectable 25 Gram(s) IV Push once  ertapenem  IVPB 1000 milliGRAM(s) IV Intermittent every 24 hours  fentaNYL   Infusion. 2 MICROgram(s)/kG/Hr (22.44 mL/Hr) IV Continuous <Continuous>  heparin  Injectable 7500 Unit(s) SubCutaneous every 8 hours  influenza   Vaccine 0.5 milliLiter(s) IntraMuscular once  insulin lispro (HumaLOG) corrective regimen sliding scale   SubCutaneous every 6 hours  insulin NPH human recombinant 20 Unit(s) SubCutaneous every 6 hours  levothyroxine Injectable 100 MICROGram(s) IV Push at bedtime  methylPREDNISolone sodium succinate Injectable 10 milliGRAM(s) IV Push daily  norepinephrine Infusion 0.05 MICROgram(s)/kG/Min (5.259 mL/Hr) IV Continuous <Continuous>  pantoprazole  Injectable 40 milliGRAM(s) IV Push daily  petrolatum Ophthalmic Ointment 1 Application(s) Both EYES every 12 hours  sildenafil (REVATIO) 20 milliGRAM(s) Oral three times a day  simvastatin 20 milliGRAM(s) Oral at bedtime  sodium chloride 3%  Inhalation 4 milliLiter(s) Inhalation every 6 hours    MEDICATIONS  (PRN):  acetaminophen    Suspension .. 650 milliGRAM(s) Oral every 6 hours PRN Temp greater or equal to 38C (100.4F), Mild Pain (1 - 3), Moderate Pain (4 - 6)  dextrose 40% Gel 15 Gram(s) Oral once PRN Blood Glucose LESS THAN 70 milliGRAM(s)/deciliter  glucagon  Injectable 1 milliGRAM(s) IntraMuscular once PRN Glucose LESS THAN 70 milligrams/deciliter  glucagon  Injectable 1 milliGRAM(s) IntraMuscular once PRN Glucose LESS THAN 70 milligrams/deciliter      LABS:                        9.2    14.31 )-----------( 457      ( 2019 03:00 )             32.1     02-25    143  |  94<L>  |  43<H>  ----------------------------<  174<H>  3.5   |  28  |  0.94    Ca    10.1      2019 03:00  Phos  3.9       Mg     2.1         TPro  7.1  /  Alb  3.6  /  TBili  0.5  /  DBili  x   /  AST  34<H>  /  ALT  23  /  AlkPhos  145<H>      PT/INR - ( 2019 02:40 )   PT: 12.7 SEC;   INR: 1.11          PTT - ( 2019 02:40 )  PTT:34.3 SEC  Urinalysis Basic - ( 2019 12:30 )    Color: LIGHT YELLOW / Appearance: CLEAR / S.013 / pH: 8.0  Gluc: NEGATIVE / Ketone: NEGATIVE  / Bili: NEGATIVE / Urobili: NORMAL   Blood: NEGATIVE / Protein: 30 / Nitrite: NEGATIVE   Leuk Esterase: TRACE / RBC: 3-5 / WBC 11-25   Sq Epi: OCC / Non Sq Epi: x / Bacteria: NEGATIVE      Arterial Blood Gas:   @ 17:01  7.53/38/79/33/97.0/8.9  ABG lactate: 2.7        MICROBIOLOGY:     RADIOLOGY:  [ ] Reviewed and interpreted by me    EKG:

## 2019-02-25 NOTE — CHART NOTE - NSCHARTNOTEFT_GEN_A_CORE
:  Navya Flower, PGY 3  Internal Medicine  Pager [phone] | [phone]    INDICATION: evaluate for renal disease/hydronephrosis    PROCEDURE:  [ ] LIMITED ECHO  [ ] LIMITED CHEST  [x] LIMITED RETROPERITONEAL  [ ] LIMITED ABDOMINAL  [ ] LIMITED DVT  [ ] NEEDLE GUIDANCE VASCULAR  [ ] NEEDLE GUIDANCE THORACENTESIS  [ ] NEEDLE GUIDANCE PARACENTESIS  [ ] NEEDLE GUIDANCE PERICARDIOCENTESIS  [ ] OTHER    FINDINGS:    Renal:     Right kidney with multiple cysts that appear simple without septations or irregularities    Left Kidney no evidence hydronephrosis.       INTERPRETATION:    No evidence hydronephrosis, normal echogenicity

## 2019-02-25 NOTE — PROGRESS NOTE ADULT - ASSESSMENT
63 F with PMH of COPD (on home O2 (3L), hypothyroidism, T2DM (HbA1c 8s 11/2018, on insulin), RA on prednisone (10 mg daily x 30 years), HTN,  admitted for SBO s/p ex lap with lysis of adhesions, had been intubated for hypercapnic respiratory failure. 63 F with PMH of COPD (on home O2 (3L), hypothyroidism, T2DM (HbA1c 8s 11/2018, on insulin), RA on prednisone (10 mg daily x 30 years), HTN,  admitted for SBO s/p ex lap with lysis of adhesions, had been intubated for hypercapnic respiratory failure. primary team planning to extubate

## 2019-02-25 NOTE — PROGRESS NOTE ADULT - ATTENDING COMMENTS
doing better on the vent all things considered/awake and alert/severe weakness/sepsis/and multiple medical issues/very guarded but we may try to extubared

## 2019-02-25 NOTE — PROGRESS NOTE ADULT - SUBJECTIVE AND OBJECTIVE BOX
History: Patient still intubated, on steroid.  On pressors, tube feeds.  12pm FS trending higher than others while on continuous tube feeds    MEDICATIONS  (STANDING):  chlorhexidine 0.12% Liquid 15 milliLiter(s) Swish and Spit every 12 hours  chlorhexidine 4% Liquid 1 Application(s) Topical <User Schedule>  collagenase Ointment 1 Application(s) Topical daily  dexmedetomidine Infusion 0.2 MICROgram(s)/kG/Hr (5.61 mL/Hr) IV Continuous <Continuous>  ertapenem  IVPB 1000 milliGRAM(s) IV Intermittent every 24 hours  fentaNYL   Infusion. 2 MICROgram(s)/kG/Hr (22.44 mL/Hr) IV Continuous <Continuous>  heparin  Injectable 7500 Unit(s) SubCutaneous every 8 hours  influenza   Vaccine 0.5 milliLiter(s) IntraMuscular once  insulin lispro (HumaLOG) corrective regimen sliding scale   SubCutaneous every 6 hours  insulin NPH human recombinant 20 Unit(s) SubCutaneous every 6 hours  levothyroxine Injectable 100 MICROGram(s) IV Push at bedtime  methylPREDNISolone sodium succinate Injectable 10 milliGRAM(s) IV Push daily  norepinephrine Infusion 0.05 MICROgram(s)/kG/Min (5.259 mL/Hr) IV Continuous <Continuous>  pantoprazole  Injectable 40 milliGRAM(s) IV Push daily  petrolatum Ophthalmic Ointment 1 Application(s) Both EYES every 12 hours  sildenafil (REVATIO) 20 milliGRAM(s) Oral three times a day  simvastatin 20 milliGRAM(s) Oral at bedtime  sodium chloride 3%  Inhalation 4 milliLiter(s) Inhalation every 6 hours    MEDICATIONS  (PRN):  acetaminophen    Suspension .. 650 milliGRAM(s) Oral every 6 hours PRN Temp greater or equal to 38C (100.4F), Mild Pain (1 - 3), Moderate Pain (4 - 6)  dextrose 40% Gel 15 Gram(s) Oral once PRN Blood Glucose LESS THAN 70 milliGRAM(s)/deciliter  glucagon  Injectable 1 milliGRAM(s) IntraMuscular once PRN Glucose LESS THAN 70 milligrams/deciliter  glucagon  Injectable 1 milliGRAM(s) IntraMuscular once PRN Glucose LESS THAN 70 milligrams/deciliter      Allergies    No Known Allergies    Intolerances    Seroquel (Other)    Review of Systems:  Unable to obtain     PHYSICAL EXAM:  Vital Signs Last 24 Hrs  T(C): 37.4 (25 Feb 2019 12:00), Max: 37.6 (24 Feb 2019 16:00)  T(F): 99.4 (25 Feb 2019 12:00), Max: 99.6 (24 Feb 2019 16:00)  HR: 97 (25 Feb 2019 14:00) (64 - 104)  BP: 129/84 (25 Feb 2019 14:00) (78/59 - 142/80)  BP(mean): 97 (25 Feb 2019 14:00) (67 - 98)  RR: 21 (25 Feb 2019 14:00) (17 - 25)  SpO2: 99% (25 Feb 2019 14:00) (92% - 100%)  GENERAL: remains acutely ill  GI: Soft, nontender, non distended  PSYCH: Pt is intubated, and sedated medically    CAPILLARY BLOOD GLUCOSE      POCT Blood Glucose.: 366 mg/dL (25 Feb 2019 12:21)  POCT Blood Glucose.: 191 mg/dL (25 Feb 2019 05:43)  POCT Blood Glucose.: 148 mg/dL (25 Feb 2019 00:18)  POCT Blood Glucose.: 168 mg/dL (24 Feb 2019 17:10)    POCT Blood Glucose.: 168 mg/dL (02-24-19 @ 17:10)  POCT Blood Glucose.: 308 mg/dL (02-24-19 @ 12:09)  POCT Blood Glucose.: 227 mg/dL (02-24-19 @ 06:06)  POCT Blood Glucose.: 221 mg/dL (02-24-19 @ 00:15)  POCT Blood Glucose.: 180 mg/dL (02-23-19 @ 17:56)  POCT Blood Glucose.: 313 mg/dL (02-23-19 @ 11:58)  POCT Blood Glucose.: 163 mg/dL (02-23-19 @ 06:09)  POCT Blood Glucose.: 137 mg/dL (02-23-19 @ 00:23)  POCT Blood Glucose.: 146 mg/dL (02-22-19 @ 17:42)  POCT Blood Glucose.: 321 mg/dL (02-22-19 @ 13:15)  POCT Blood Glucose.: 130 mg/dL (02-22-19 @ 05:44)  POCT Blood Glucose.: 112 mg/dL (02-21-19 @ 23:58)  POCT Blood Glucose.: 129 mg/dL (02-21-19 @ 17:57)      02-24    139  |  92<L>  |  52<H>  ----------------------------<  239<H>  3.9   |  26  |  1.24    EGFR if : 54  EGFR if non : 47    Ca    10.3      02-24  Mg     2.2     02-24  Phos  5.6     02-24    TPro  7.7  /  Alb  3.6  /  TBili  0.5  /  DBili  x   /  AST  31  /  ALT  27  /  AlkPhos  167<H>  02-24        Thyroid Function Tests:      Hemoglobin A1C, Whole Blood: 7.1 % <H> [4.0 - 5.6] (12-17-18 @ 03:50)  Hemoglobin A1C, Whole Blood: 7.3 % <H> [4.0 - 5.6] (12-16-18 @ 04:17)

## 2019-02-26 LAB
ALBUMIN SERPL ELPH-MCNC: 3.7 G/DL — SIGNIFICANT CHANGE UP (ref 3.3–5)
ALP SERPL-CCNC: 140 U/L — HIGH (ref 40–120)
ALT FLD-CCNC: 24 U/L — SIGNIFICANT CHANGE UP (ref 4–33)
ANION GAP SERPL CALC-SCNC: 16 MMO/L — HIGH (ref 7–14)
AST SERPL-CCNC: 27 U/L — SIGNIFICANT CHANGE UP (ref 4–32)
BACTERIA SPT RESP CULT: SIGNIFICANT CHANGE UP
BASOPHILS # BLD AUTO: 0.08 K/UL — SIGNIFICANT CHANGE UP (ref 0–0.2)
BASOPHILS NFR BLD AUTO: 0.6 % — SIGNIFICANT CHANGE UP (ref 0–2)
BILIRUB SERPL-MCNC: 0.5 MG/DL — SIGNIFICANT CHANGE UP (ref 0.2–1.2)
BUN SERPL-MCNC: 34 MG/DL — HIGH (ref 7–23)
CALCIUM SERPL-MCNC: 10 MG/DL — SIGNIFICANT CHANGE UP (ref 8.4–10.5)
CHLORIDE SERPL-SCNC: 99 MMOL/L — SIGNIFICANT CHANGE UP (ref 98–107)
CO2 SERPL-SCNC: 25 MMOL/L — SIGNIFICANT CHANGE UP (ref 22–31)
CREAT SERPL-MCNC: 0.8 MG/DL — SIGNIFICANT CHANGE UP (ref 0.5–1.3)
EOSINOPHIL # BLD AUTO: 0.08 K/UL — SIGNIFICANT CHANGE UP (ref 0–0.5)
EOSINOPHIL NFR BLD AUTO: 0.6 % — SIGNIFICANT CHANGE UP (ref 0–6)
GLUCOSE BLDC GLUCOMTR-MCNC: 109 MG/DL — HIGH (ref 70–99)
GLUCOSE BLDC GLUCOMTR-MCNC: 203 MG/DL — HIGH (ref 70–99)
GLUCOSE BLDC GLUCOMTR-MCNC: 265 MG/DL — HIGH (ref 70–99)
GLUCOSE BLDC GLUCOMTR-MCNC: 96 MG/DL — SIGNIFICANT CHANGE UP (ref 70–99)
GLUCOSE SERPL-MCNC: 215 MG/DL — HIGH (ref 70–99)
HCT VFR BLD CALC: 34 % — LOW (ref 34.5–45)
HGB BLD-MCNC: 9.6 G/DL — LOW (ref 11.5–15.5)
IMM GRANULOCYTES NFR BLD AUTO: 0.7 % — SIGNIFICANT CHANGE UP (ref 0–1.5)
LYMPHOCYTES # BLD AUTO: 1.61 K/UL — SIGNIFICANT CHANGE UP (ref 1–3.3)
LYMPHOCYTES # BLD AUTO: 12.6 % — LOW (ref 13–44)
MAGNESIUM SERPL-MCNC: 2.1 MG/DL — SIGNIFICANT CHANGE UP (ref 1.6–2.6)
MCHC RBC-ENTMCNC: 28.2 % — LOW (ref 32–36)
MCHC RBC-ENTMCNC: 28.7 PG — SIGNIFICANT CHANGE UP (ref 27–34)
MCV RBC AUTO: 101.5 FL — HIGH (ref 80–100)
MONOCYTES # BLD AUTO: 0.43 K/UL — SIGNIFICANT CHANGE UP (ref 0–0.9)
MONOCYTES NFR BLD AUTO: 3.4 % — SIGNIFICANT CHANGE UP (ref 2–14)
NEUTROPHILS # BLD AUTO: 10.46 K/UL — HIGH (ref 1.8–7.4)
NEUTROPHILS NFR BLD AUTO: 82.1 % — HIGH (ref 43–77)
NRBC # FLD: 0.04 K/UL — LOW (ref 25–125)
PHOSPHATE SERPL-MCNC: 3.5 MG/DL — SIGNIFICANT CHANGE UP (ref 2.5–4.5)
PLATELET # BLD AUTO: 467 K/UL — HIGH (ref 150–400)
PMV BLD: 10.1 FL — SIGNIFICANT CHANGE UP (ref 7–13)
POTASSIUM SERPL-MCNC: 4.9 MMOL/L — SIGNIFICANT CHANGE UP (ref 3.5–5.3)
POTASSIUM SERPL-SCNC: 4.9 MMOL/L — SIGNIFICANT CHANGE UP (ref 3.5–5.3)
PROT SERPL-MCNC: 8 G/DL — SIGNIFICANT CHANGE UP (ref 6–8.3)
RBC # BLD: 3.35 M/UL — LOW (ref 3.8–5.2)
RBC # FLD: 19.4 % — HIGH (ref 10.3–14.5)
SODIUM SERPL-SCNC: 140 MMOL/L — SIGNIFICANT CHANGE UP (ref 135–145)
WBC # BLD: 12.75 K/UL — HIGH (ref 3.8–10.5)
WBC # FLD AUTO: 12.75 K/UL — HIGH (ref 3.8–10.5)

## 2019-02-26 PROCEDURE — 99291 CRITICAL CARE FIRST HOUR: CPT

## 2019-02-26 RX ORDER — INSULIN GLARGINE 100 [IU]/ML
18 INJECTION, SOLUTION SUBCUTANEOUS AT BEDTIME
Qty: 0 | Refills: 0 | Status: DISCONTINUED | OUTPATIENT
Start: 2019-02-26 | End: 2019-02-28

## 2019-02-26 RX ORDER — FENTANYL CITRATE 50 UG/ML
1 INJECTION INTRAVENOUS
Qty: 0 | Refills: 0 | Status: DISCONTINUED | OUTPATIENT
Start: 2019-02-26 | End: 2019-02-28

## 2019-02-26 RX ADMIN — PANTOPRAZOLE SODIUM 40 MILLIGRAM(S): 20 TABLET, DELAYED RELEASE ORAL at 11:52

## 2019-02-26 RX ADMIN — HEPARIN SODIUM 7500 UNIT(S): 5000 INJECTION INTRAVENOUS; SUBCUTANEOUS at 14:26

## 2019-02-26 RX ADMIN — CHLORHEXIDINE GLUCONATE 1 APPLICATION(S): 213 SOLUTION TOPICAL at 10:46

## 2019-02-26 RX ADMIN — HEPARIN SODIUM 7500 UNIT(S): 5000 INJECTION INTRAVENOUS; SUBCUTANEOUS at 06:32

## 2019-02-26 RX ADMIN — Medication 4: at 11:43

## 2019-02-26 RX ADMIN — FENTANYL CITRATE 1 PATCH: 50 INJECTION INTRAVENOUS at 10:43

## 2019-02-26 RX ADMIN — Medication 100 MICROGRAM(S): at 23:31

## 2019-02-26 RX ADMIN — DEXMEDETOMIDINE HYDROCHLORIDE IN 0.9% SODIUM CHLORIDE 5.61 MICROGRAM(S)/KG/HR: 4 INJECTION INTRAVENOUS at 17:59

## 2019-02-26 RX ADMIN — FENTANYL CITRATE 22.44 MICROGRAM(S)/KG/HR: 50 INJECTION INTRAVENOUS at 17:57

## 2019-02-26 RX ADMIN — Medication 1 APPLICATION(S): at 10:47

## 2019-02-26 RX ADMIN — ERTAPENEM SODIUM 120 MILLIGRAM(S): 1 INJECTION, POWDER, LYOPHILIZED, FOR SOLUTION INTRAMUSCULAR; INTRAVENOUS at 06:37

## 2019-02-26 RX ADMIN — DEXMEDETOMIDINE HYDROCHLORIDE IN 0.9% SODIUM CHLORIDE 5.61 MICROGRAM(S)/KG/HR: 4 INJECTION INTRAVENOUS at 11:33

## 2019-02-26 RX ADMIN — FENTANYL CITRATE 1 PATCH: 50 INJECTION INTRAVENOUS at 20:56

## 2019-02-26 RX ADMIN — CHLORHEXIDINE GLUCONATE 15 MILLILITER(S): 213 SOLUTION TOPICAL at 17:57

## 2019-02-26 RX ADMIN — HEPARIN SODIUM 7500 UNIT(S): 5000 INJECTION INTRAVENOUS; SUBCUTANEOUS at 23:30

## 2019-02-26 RX ADMIN — Medication 10 MILLIGRAM(S): at 06:33

## 2019-02-26 RX ADMIN — Medication 6: at 06:33

## 2019-02-26 NOTE — CHART NOTE - NSCHARTNOTEFT_GEN_A_CORE
Source:  nursing and EMR     Diet : NPO except medications     Patient on BiPAP, LETHARGIC, noted 2+ generalized edema , pt. was on EN SUPPORT , currently s/p extubation , maintained NPO .     Current Weight: - 98.9 kg on 2/26/19 - noted intentional wt. loss ; 106.4 kg on 2/15/19; Admit wt. - 112.2 kg ; IBW - 47.7 kg     Pertinent Medications: MEDICATIONS  (STANDING):  chlorhexidine 0.12% Liquid 15 milliLiter(s) Swish and Spit every 12 hours  chlorhexidine 4% Liquid 1 Application(s) Topical <User Schedule>  collagenase Ointment 1 Application(s) Topical daily  dexmedetomidine Infusion 0.2 MICROgram(s)/kG/Hr (5.61 mL/Hr) IV Continuous <Continuous>  dextrose 5%. 1000 milliLiter(s) (50 mL/Hr) IV Continuous <Continuous>  dextrose 50% Injectable 12.5 Gram(s) IV Push once  dextrose 50% Injectable 25 Gram(s) IV Push once  dextrose 50% Injectable 25 Gram(s) IV Push once  ertapenem  IVPB 1000 milliGRAM(s) IV Intermittent every 24 hours  fentaNYL   Infusion. 2 MICROgram(s)/kG/Hr (22.44 mL/Hr) IV Continuous <Continuous>  fentaNYL   Patch  50 MICROgram(s)/Hr 1 Patch Transdermal every 72 hours  heparin  Injectable 7500 Unit(s) SubCutaneous every 8 hours  influenza   Vaccine 0.5 milliLiter(s) IntraMuscular once  insulin glargine Injectable (LANTUS) 15 Unit(s) SubCutaneous at bedtime  insulin lispro (HumaLOG) corrective regimen sliding scale   SubCutaneous every 6 hours  levothyroxine Injectable 100 MICROGram(s) IV Push at bedtime  methylPREDNISolone sodium succinate Injectable 10 milliGRAM(s) IV Push daily  norepinephrine Infusion 0.05 MICROgram(s)/kG/Min (5.259 mL/Hr) IV Continuous <Continuous>  pantoprazole  Injectable 40 milliGRAM(s) IV Push daily  petrolatum Ophthalmic Ointment 1 Application(s) Both EYES every 12 hours  sildenafil (REVATIO) 20 milliGRAM(s) Oral three times a day  simvastatin 20 milliGRAM(s) Oral at bedtime    MEDICATIONS  (PRN):  acetaminophen    Suspension .. 650 milliGRAM(s) Oral every 6 hours PRN Temp greater or equal to 38C (100.4F), Mild Pain (1 - 3), Moderate Pain (4 - 6)  dextrose 40% Gel 15 Gram(s) Oral once PRN Blood Glucose LESS THAN 70 milliGRAM(s)/deciliter  glucagon  Injectable 1 milliGRAM(s) IntraMuscular once PRN Glucose LESS THAN 70 milligrams/deciliter  glucagon  Injectable 1 milliGRAM(s) IntraMuscular once PRN Glucose LESS THAN 70 milligrams/deciliter    Pertinent Labs:  02-26 Na140 mmol/L Glu 215 mg/dL<H> K+ 4.9 mmol/L Cr  0.80 mg/dL BUN 34 mg/dL<H> 02-26 Phos 3.5 mg/dL 02-26 Alb 3.7 g/dL      Recommend nutrition support consistent with GOC . Resume EN - Glucerna 1.2 @ 40 ml/hr and no carb Pro source 2 packs daily if EN indicated .    Monitoring and Evaluation:  Tolerance to diet prescription , weights and follow up per protocol

## 2019-02-26 NOTE — PROGRESS NOTE ADULT - ASSESSMENT
At this juncture, with the patient tolerating BIPAP, and not on sedation, a MOLST should be executed with her expressed wishes for future goals of care.     Sneha Altamirano,    [id number]  Office--[phone]

## 2019-02-26 NOTE — PROGRESS NOTE ADULT - PROBLEM SELECTOR PLAN 1
BG elevated above goal of 140-180 mg/dl.-   recommedn increase lantus to 18 units s qqhs  and continue moderate dose humalog correctional scale q6h.

## 2019-02-26 NOTE — PROGRESS NOTE ADULT - ASSESSMENT
*In Progress*     63 year old female with PMHx of HTN, DM, hypothyroidism, RA (prednisone 10mg), pulmonary HTN, CHRIS, and COPD (3L at home) who was originally admitted on 12/15 for SBO s/p ex lap with lysis of adhesions (4 retention sutures) c/b 12/30 she eviscerated 2/2 a coughing episode s/p ex lap (6 retention sutures) c/b multifocal pneumonia, developed hypercapnic respiratory failure requiring intubation 1/6, s/p bronch 1/9/18, and extubated on 1/24, course further c/b Enterococci bacteremia now readmitted to MICU for hypoxic respiratory failure 2/2 atelectasis requiring intubation 01/31. Had grown ESBL in urine, now on ertapenem and levophed.     #Neuro:   - Extubated,   - Currently on fentanyl and precedex on low doses    # Skin: L SC line 2/24  - Line changed yesterday given fever and hypotension    #GI  - SBO s/p ex-lap with findings of healthy bowel c/b evisceration of bowel requiring placement of retention sutures  - NPO with tube feeds   - Continue Protonix    #Renal/Metabolic  - Monitor for electrolyte derangements in the setting of diuresis; replete as needed  - trend BMP for BUN/Cr  - Will closely monitor Na with diuresis. Holding lasix and metolazone given hypotension. Goal net even. Overall net negative in last 24 hours, despite holding diuresis. Will need to readdress tomorrow if positive fluid balance.     #Endocrine  1) Diabetes Mellitus Type 2  - c/w adjusted NPH per endocrinology recommendations, NPH 21, except for 6 am dose which is increased to 24, may need lantus 25 if extubated successfully   - Gabapentin 400 TID held for now   - Endocrinology recommendations appreciated    2) Hypothyroidism   - c/w 100 mcg levothyroxine IV     3.) Rheumatoid arthritis:   - Continue solumedrol 10 daily, however monitor FSGs closely for hyperglycemia    # Volume status:   - Overall net negative  - Given hypotension, goal for net even to positive, holding lasix 40 Q8 and metolazone  - Continue lasix and metolazone     # Heme:  - Hemoglobin improved, likely from blood draws and bone marrow suppression  - s/p 1 unit pRBCs (2/20)  - WBC ct uptrending today    #DVT ppx  - Heparin 7500 SQ TID    #ID   - Continue ertapenem daily (started 2/18, day 7) concern for low grade fever, increasing white count and norepinephrine requirement  - Blood cultures sent 48 hours ago, resending sputum culture, switching central line  - Had exchanged iraheta when initially had ESBL about 1 week ago  - Off previous antibiotics as per discussion w/ ID - had 27 days of Vanc, 3 days of Ceftriaxone, and 8 days of Zosyn for enterococcal bacteremia and Klebsiella sputum culture   - Urine cx with ESBL 10-50k colonies and candida, potentially colonized  - C diff negative    #Cardiovascular  1) Previously reported to have cardiogenic shock. Overloaded currently  - Overall net positive in last 24 hours given hypotension  - Continue to hold home blood pressure medications  - c/w Simvastatin 20mg   - Holding lasix 40 Q8 with metolazone daily    #Respiratory   1) Hypoxic Respiratory Failure 2/2 atelectasis requiring intubation 1/31.   - Concern for prolonged intubation and deconditioning. Has had prolonged hospital course with multiple intubations. Previous bronchoscopy w/ findings concerning for hyperdynamic airway collapse.   - Wean PEEP; SBT  - C/w sildenafil 20 TID, now off NO.   - Continue solumedrol 10 mg daily   - Attempting extubation trial today  - Ethics consult appreciated.     # Ethics:   - [facility] discussion ongoing with Palliative Care team. Pending reconsult.   - Will need further discussions with family including son (HCP) and daughter coming in, regarding potential tracheostomy with ethics assistance  - Complicated family dynamic.   - Family requesting transfer to [facility] for second opinion. Curtis Pisano plans to obtain accepting physician. Once accepting physician is obtained, primary team will need to call transfer center per policy.   - Curtis Pisano requests that no information be given to patient without himself present. 63 year old female with PMHx of HTN, DM, hypothyroidism, RA (prednisone 10mg), pulmonary HTN, CHRIS, and COPD (3L at home) who was originally admitted on 12/15 for SBO s/p ex lap with lysis of adhesions (4 retention sutures) c/b 12/30 she eviscerated 2/2 a coughing episode s/p ex lap (6 retention sutures) c/b multifocal pneumonia, developed hypercapnic respiratory failure requiring intubation 1/6, s/p bronch 1/9/18, and extubated on 1/24, course further c/b Enterococci bacteremia now readmitted to MICU for hypoxic respiratory failure 2/2 atelectasis requiring intubation 01/31. Had grown ESBL in urine, now on ertapenem and levophed.     #Neuro:   - Extubated on 2/26   - Currently on fentanyl and precedex on low doses, switching to fentanyl patch    # Skin: TLC IJ line 2/24     #GI  - SBO s/p ex-lap with findings of healthy bowel c/b evisceration of bowel requiring placement of retention sutures  - NPO until can tolerate briefly off BIPAP for bedside speech assessment  - Continue Protonix    #Renal/Metabolic  - Monitor for electrolyte derangements in the setting of diuresis; replete as needed  - trend BMP for BUN/Cr  - Holding lasix and metolazone given previous pressor requirement. Overall net negative in last 24 hours, despite holding diuresis.   - Will need to readdress tomorrow if positive fluid balance.     #Endocrine  1) Diabetes Mellitus Type 2  - c/w lantus 25 qhs with sliding scale   - Gabapentin 400 TID held for now   - Endocrinology recommendations appreciated    2) Hypothyroidism   - c/w 100 mcg levothyroxine IV     3.) Rheumatoid arthritis:   - Continue solumedrol 10 daily, however monitor FSGs closely for hyperglycemia    # Volume status:   - Overall net negative, despite holding lasix 40 Q8 and metolazone    # Heme:  - Hemoglobin improved, likely from blood draws and bone marrow suppression  - s/p 1 unit pRBCs (2/20)  - WBC ct improving today    #DVT ppx  - Heparin 7500 SQ TID    #ID   - Continue ertapenem daily (started 2/18, day 9)   - Blood cultures NGTD, resending sputum culture  - Had exchanged iraheta when initially had ESBL about 1 week ago  - Off previous antibiotics as per discussion w/ ID - had 27 days of Vanc, 3 days of Ceftriaxone, and 8 days of Zosyn for enterococcal bacteremia and Klebsiella sputum culture   - Urine cx with ESBL 10-50k colonies and candida, potentially colonized  - C diff negative    #Cardiovascular  1) Previously reported to have cardiogenic shock. Overloaded currently  - Overall net positive in last 24 hours given hypotension  - Continue to hold home blood pressure medications  - c/w Simvastatin 20mg   - Holding lasix 40 Q8 with metolazone daily    #Respiratory   1) Hypoxic Respiratory Failure 2/2 atelectasis requiring intubation 1/31.   - Concern for prolonged intubation and deconditioning. Has had prolonged hospital course with multiple intubations. Previous bronchoscopy w/ findings concerning for hyperdynamic airway collapse.   - Wean PEEP; SBT  - C/w sildenafil 20 TID, now off NO.   - Continue solumedrol 10 mg daily   - Extubated on 2/26  - Ethics consult appreciated.     # Ethics:   - [facility] discussion ongoing with Palliative Care team. Pending reconsult.   - Family requesting updated HCP, however patient remains on fentanyl and precedex to keep her calm. Will readdress capacity if able to taper off sedation.   - [facility] requesting [facility] for [facility] opinion. Curtis Pisano plans to obtain accepting physician. Once accepting physician is obtained, primary team will need to call transfer center per policy.   - Curtis Pisano requests that no information be given to patient without himself present.
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
## 2019-02-26 NOTE — PROGRESS NOTE ADULT - SUBJECTIVE AND OBJECTIVE BOX
History: pt now extubated on bipap mask and off tube feeds.  Currently NPO feeds    MEDICATIONS  (STANDING):  chlorhexidine 0.12% Liquid 15 milliLiter(s) Swish and Spit every 12 hours  chlorhexidine 4% Liquid 1 Application(s) Topical <User Schedule>  collagenase Ointment 1 Application(s) Topical daily  dexmedetomidine Infusion 0.2 MICROgram(s)/kG/Hr (5.61 mL/Hr) IV Continuous <Continuous>  ertapenem  IVPB 1000 milliGRAM(s) IV Intermittent every 24 hours  fentaNYL   Infusion. 2 MICROgram(s)/kG/Hr (22.44 mL/Hr) IV Continuous <Continuous>  heparin  Injectable 7500 Unit(s) SubCutaneous every 8 hours  influenza   Vaccine 0.5 milliLiter(s) IntraMuscular once  insulin lispro (HumaLOG) corrective regimen sliding scale   SubCutaneous every 6 hours  insulin NPH human recombinant 20 Unit(s) SubCutaneous every 6 hours  levothyroxine Injectable 100 MICROGram(s) IV Push at bedtime  methylPREDNISolone sodium succinate Injectable 10 milliGRAM(s) IV Push daily  norepinephrine Infusion 0.05 MICROgram(s)/kG/Min (5.259 mL/Hr) IV Continuous <Continuous>  pantoprazole  Injectable 40 milliGRAM(s) IV Push daily  petrolatum Ophthalmic Ointment 1 Application(s) Both EYES every 12 hours  sildenafil (REVATIO) 20 milliGRAM(s) Oral three times a day  simvastatin 20 milliGRAM(s) Oral at bedtime  sodium chloride 3%  Inhalation 4 milliLiter(s) Inhalation every 6 hours    MEDICATIONS  (PRN):  acetaminophen    Suspension .. 650 milliGRAM(s) Oral every 6 hours PRN Temp greater or equal to 38C (100.4F), Mild Pain (1 - 3), Moderate Pain (4 - 6)  dextrose 40% Gel 15 Gram(s) Oral once PRN Blood Glucose LESS THAN 70 milliGRAM(s)/deciliter  glucagon  Injectable 1 milliGRAM(s) IntraMuscular once PRN Glucose LESS THAN 70 milligrams/deciliter  glucagon  Injectable 1 milliGRAM(s) IntraMuscular once PRN Glucose LESS THAN 70 milligrams/deciliter      Allergies    No Known Allergies    Intolerances    Seroquel (Other)    Review of Systems:  Unable to obtain     PHYSICAL EXAM:  Vital Signs Last 24 Hrs  T(C): 36.7 (26 Feb 2019 12:00), Max: 37.1 (25 Feb 2019 20:00)  T(F): 98 (26 Feb 2019 12:00), Max: 98.7 (25 Feb 2019 20:00)  HR: 84 (26 Feb 2019 17:00) (80 - 99)  BP: 116/72 (26 Feb 2019 17:00) (90/62 - 145/90)  BP(mean): 85 (26 Feb 2019 17:00) (70 - 115)  RR: 16 (26 Feb 2019 17:00) (16 - 37)  SpO2: 96% (26 Feb 2019 17:00) (92% - 100%)  GENERAL: remains acutely ill  GI: Soft, nontender, non distended  PSYCH: alert, making needs known        CAPILLARY BLOOD GLUCOSE    POCT Blood Glucose.: 203 mg/dL (26 Feb 2019 11:31)  POCT Blood Glucose.: 265 mg/dL (26 Feb 2019 06:32)  POCT Blood Glucose.: 118 mg/dL (25 Feb 2019 23:08)  POCT Blood Glucose.: 207 mg/dL (25 Feb 2019 18:24)    POCT Blood Glucose.: 366 mg/dL (25 Feb 2019 12:21)  POCT Blood Glucose.: 191 mg/dL (25 Feb 2019 05:43)  POCT Blood Glucose.: 148 mg/dL (25 Feb 2019 00:18)  POCT Blood Glucose.: 168 mg/dL (24 Feb 2019 17:10)    POCT Blood Glucose.: 168 mg/dL (02-24-19 @ 17:10)  POCT Blood Glucose.: 308 mg/dL (02-24-19 @ 12:09)  POCT Blood Glucose.: 227 mg/dL (02-24-19 @ 06:06)  POCT Blood Glucose.: 221 mg/dL (02-24-19 @ 00:15)  POCT Blood Glucose.: 180 mg/dL (02-23-19 @ 17:56)  POCT Blood Glucose.: 313 mg/dL (02-23-19 @ 11:58)  POCT Blood Glucose.: 163 mg/dL (02-23-19 @ 06:09)  POCT Blood Glucose.: 137 mg/dL (02-23-19 @ 00:23)  POCT Blood Glucose.: 146 mg/dL (02-22-19 @ 17:42)  POCT Blood Glucose.: 321 mg/dL (02-22-19 @ 13:15)  POCT Blood Glucose.: 130 mg/dL (02-22-19 @ 05:44)  POCT Blood Glucose.: 112 mg/dL (02-21-19 @ 23:58)  POCT Blood Glucose.: 129 mg/dL (02-21-19 @ 17:57)      02-24    139  |  92<L>  |  52<H>  ----------------------------<  239<H>  3.9   |  26  |  1.24    EGFR if : 54  EGFR if non : 47    Ca    10.3      02-24  Mg     2.2     02-24  Phos  5.6     02-24    TPro  7.7  /  Alb  3.6  /  TBili  0.5  /  DBili  x   /  AST  31  /  ALT  27  /  AlkPhos  167<H>  02-24        Thyroid Function Tests:      Hemoglobin A1C, Whole Blood: 7.1 % <H> [4.0 - 5.6] (12-17-18 @ 03:50)  Hemoglobin A1C, Whole Blood: 7.3 % <H> [4.0 - 5.6] (12-16-18 @ 04:17)

## 2019-02-26 NOTE — PROGRESS NOTE ADULT - ASSESSMENT
63 F with PMH of COPD (on home O2 (3L), hypothyroidism, T2DM (HbA1c 8s 11/2018, on insulin), RA on prednisone (10 mg daily x 30 years), HTN,  admitted for SBO s/p ex lap with lysis of adhesions, had been intubated for hypercapnic respiratory failure. primary team planning to extubate

## 2019-02-26 NOTE — PROGRESS NOTE ADULT - SUBJECTIVE AND OBJECTIVE BOX
CHIEF COMPLAINT:    Interval Events:   No o/n events, extubated yesterday    REVIEW OF SYSTEMS:  Constitutional:   Eyes:  ENT:  CV:  Resp:  GI:  :  MSK:  Integumentary:  Neurological:  Psychiatric:  Endocrine:  Hematologic/Lymphatic:  Allergic/Immunologic:  [ ] All other systems negative  [ ] Unable to assess ROS because ________    OBJECTIVE:  ICU Vital Signs Last 24 Hrs  T(C): 36.9 (2019 04:00), Max: 37.5 (2019 16:00)  T(F): 98.4 (2019 04:00), Max: 99.5 (2019 16:00)  HR: 94 (2019 07:01) (64 - 118)  BP: 129/85 (2019 06:00) (90/62 - 142/80)  BP(mean): 98 (2019 06:00) (70 - 115)  ABP: --  ABP(mean): --  RR: 21 (2019 06:00) (17 - 37)  SpO2: 98% (2019 07:01) (94% - 100%)    Mode: CPAP with PS, FiO2: 4, PEEP: 5, PS: 5, MAP: 8     @ 07:01  -   @ 07:00  --------------------------------------------------------  IN: 701.2 mL / OUT: 1675 mL / NET: -973.8 mL      CAPILLARY BLOOD GLUCOSE      POCT Blood Glucose.: 265 mg/dL (2019 06:32)      PHYSICAL EXAM:  General:   HEENT:   Lymph Nodes:  Neck:   Respiratory:   Cardiovascular:   Abdomen:   Extremities:   Skin:   Neurological:  Psychiatry:    LINES:    HOSPITAL MEDICATIONS:  MEDICATIONS  (STANDING):  chlorhexidine 0.12% Liquid 15 milliLiter(s) Swish and Spit every 12 hours  chlorhexidine 4% Liquid 1 Application(s) Topical <User Schedule>  collagenase Ointment 1 Application(s) Topical daily  dexmedetomidine Infusion 0.2 MICROgram(s)/kG/Hr (5.61 mL/Hr) IV Continuous <Continuous>  dextrose 5%. 1000 milliLiter(s) (50 mL/Hr) IV Continuous <Continuous>  dextrose 50% Injectable 12.5 Gram(s) IV Push once  dextrose 50% Injectable 25 Gram(s) IV Push once  dextrose 50% Injectable 25 Gram(s) IV Push once  ertapenem  IVPB 1000 milliGRAM(s) IV Intermittent every 24 hours  fentaNYL   Infusion. 2 MICROgram(s)/kG/Hr (22.44 mL/Hr) IV Continuous <Continuous>  heparin  Injectable 7500 Unit(s) SubCutaneous every 8 hours  influenza   Vaccine 0.5 milliLiter(s) IntraMuscular once  insulin glargine Injectable (LANTUS) 15 Unit(s) SubCutaneous at bedtime  insulin lispro (HumaLOG) corrective regimen sliding scale   SubCutaneous every 6 hours  levothyroxine Injectable 100 MICROGram(s) IV Push at bedtime  methylPREDNISolone sodium succinate Injectable 10 milliGRAM(s) IV Push daily  norepinephrine Infusion 0.05 MICROgram(s)/kG/Min (5.259 mL/Hr) IV Continuous <Continuous>  pantoprazole  Injectable 40 milliGRAM(s) IV Push daily  petrolatum Ophthalmic Ointment 1 Application(s) Both EYES every 12 hours  sildenafil (REVATIO) 20 milliGRAM(s) Oral three times a day  simvastatin 20 milliGRAM(s) Oral at bedtime    MEDICATIONS  (PRN):  acetaminophen    Suspension .. 650 milliGRAM(s) Oral every 6 hours PRN Temp greater or equal to 38C (100.4F), Mild Pain (1 - 3), Moderate Pain (4 - 6)  dextrose 40% Gel 15 Gram(s) Oral once PRN Blood Glucose LESS THAN 70 milliGRAM(s)/deciliter  glucagon  Injectable 1 milliGRAM(s) IntraMuscular once PRN Glucose LESS THAN 70 milligrams/deciliter  glucagon  Injectable 1 milliGRAM(s) IntraMuscular once PRN Glucose LESS THAN 70 milligrams/deciliter      LABS:                        9.6    12.75 )-----------( 467      ( 2019 04:10 )             34.0     -    140  |  99  |  34<H>  ----------------------------<  215<H>  4.9   |  25  |  0.80    Ca    10.0      2019 04:10  Phos  3.5       Mg     2.1         TPro  8.0  /  Alb  3.7  /  TBili  0.5  /  DBili  x   /  AST  27  /  ALT  24  /  AlkPhos  140<H>        Urinalysis Basic - ( 2019 12:30 )    Color: LIGHT YELLOW / Appearance: CLEAR / S.013 / pH: 8.0  Gluc: NEGATIVE / Ketone: NEGATIVE  / Bili: NEGATIVE / Urobili: NORMAL   Blood: NEGATIVE / Protein: 30 / Nitrite: NEGATIVE   Leuk Esterase: TRACE / RBC: 3-5 / WBC 11-25   Sq Epi: OCC / Non Sq Epi: x / Bacteria: NEGATIVE            MICROBIOLOGY:     RADIOLOGY:  [ ] Reviewed and interpreted by me    EKG: CHIEF COMPLAINT:    Interval Events:   No o/n events, extubated yesterday. Anxious this morning on BIPAP, but overall appears to be answering questions and comfortable.     REVIEW OF SYSTEMS:  [x] Unable to assess ROS because on BIPAP, difficult to understand patient through mask    OBJECTIVE:  ICU Vital Signs Last 24 Hrs  T(C): 36.9 (2019 04:00), Max: 37.5 (2019 16:00)  T(F): 98.4 (2019 04:00), Max: 99.5 (2019 16:00)  HR: 94 (2019 07:01) (64 - 118)  BP: 129/85 (2019 06:00) (90/62 - 142/80)  BP(mean): 98 (2019 06:00) (70 - 115)  ABP: --  ABP(mean): --  RR: 21 (2019 06:00) (17 - 37)  SpO2: 98% (2019 07:01) (94% - 100%)    Mode: CPAP with PS, FiO2: 4, PEEP: 5, PS: 5, MAP: 8     @ 07:01  -  - @ 07:00  --------------------------------------------------------  IN: 701.2 mL / OUT: 1675 mL / NET: -973.8 mL      CAPILLARY BLOOD GLUCOSE      POCT Blood Glucose.: 265 mg/dL (2019 06:32)      PHYSICAL EXAM:  General: NAD   Respiratory: CTAB, no wheezes  Cardiovascular: RRR  Abdomen: soft, NT, ND, open abdomen  Skin: no obvious rashes  Neurological: Calm, answering questions through mask  Psychiatry: calm    LINES:    HOSPITAL MEDICATIONS:  MEDICATIONS  (STANDING):  chlorhexidine 0.12% Liquid 15 milliLiter(s) Swish and Spit every 12 hours  chlorhexidine 4% Liquid 1 Application(s) Topical <User Schedule>  collagenase Ointment 1 Application(s) Topical daily  dexmedetomidine Infusion 0.2 MICROgram(s)/kG/Hr (5.61 mL/Hr) IV Continuous <Continuous>  dextrose 5%. 1000 milliLiter(s) (50 mL/Hr) IV Continuous <Continuous>  dextrose 50% Injectable 12.5 Gram(s) IV Push once  dextrose 50% Injectable 25 Gram(s) IV Push once  dextrose 50% Injectable 25 Gram(s) IV Push once  ertapenem  IVPB 1000 milliGRAM(s) IV Intermittent every 24 hours  fentaNYL   Infusion. 2 MICROgram(s)/kG/Hr (22.44 mL/Hr) IV Continuous <Continuous>  heparin  Injectable 7500 Unit(s) SubCutaneous every 8 hours  influenza   Vaccine 0.5 milliLiter(s) IntraMuscular once  insulin glargine Injectable (LANTUS) 15 Unit(s) SubCutaneous at bedtime  insulin lispro (HumaLOG) corrective regimen sliding scale   SubCutaneous every 6 hours  levothyroxine Injectable 100 MICROGram(s) IV Push at bedtime  methylPREDNISolone sodium succinate Injectable 10 milliGRAM(s) IV Push daily  norepinephrine Infusion 0.05 MICROgram(s)/kG/Min (5.259 mL/Hr) IV Continuous <Continuous>  pantoprazole  Injectable 40 milliGRAM(s) IV Push daily  petrolatum Ophthalmic Ointment 1 Application(s) Both EYES every 12 hours  sildenafil (REVATIO) 20 milliGRAM(s) Oral three times a day  simvastatin 20 milliGRAM(s) Oral at bedtime    MEDICATIONS  (PRN):  acetaminophen    Suspension .. 650 milliGRAM(s) Oral every 6 hours PRN Temp greater or equal to 38C (100.4F), Mild Pain (1 - 3), Moderate Pain (4 - 6)  dextrose 40% Gel 15 Gram(s) Oral once PRN Blood Glucose LESS THAN 70 milliGRAM(s)/deciliter  glucagon  Injectable 1 milliGRAM(s) IntraMuscular once PRN Glucose LESS THAN 70 milligrams/deciliter  glucagon  Injectable 1 milliGRAM(s) IntraMuscular once PRN Glucose LESS THAN 70 milligrams/deciliter      LABS:                        9.6    12.75 )-----------( 467      ( 2019 04:10 )             34.0     -    140  |  99  |  34<H>  ----------------------------<  215<H>  4.9   |  25  |  0.80    Ca    10.0      2019 04:10  Phos  3.5       Mg     2.1         TPro  8.0  /  Alb  3.7  /  TBili  0.5  /  DBili  x   /  AST  27  /  ALT  24  /  AlkPhos  140<H>        Urinalysis Basic - ( 2019 12:30 )    Color: LIGHT YELLOW / Appearance: CLEAR / S.013 / pH: 8.0  Gluc: NEGATIVE / Ketone: NEGATIVE  / Bili: NEGATIVE / Urobili: NORMAL   Blood: NEGATIVE / Protein: 30 / Nitrite: NEGATIVE   Leuk Esterase: TRACE / RBC: 3-5 / WBC 11-25   Sq Epi: OCC / Non Sq Epi: x / Bacteria: NEGATIVE            MICROBIOLOGY:     RADIOLOGY:  [ ] Reviewed and interpreted by me    EKG:

## 2019-02-27 LAB
ALBUMIN SERPL ELPH-MCNC: 3.5 G/DL — SIGNIFICANT CHANGE UP (ref 3.3–5)
ALP SERPL-CCNC: 123 U/L — HIGH (ref 40–120)
ALT FLD-CCNC: 24 U/L — SIGNIFICANT CHANGE UP (ref 4–33)
ANION GAP SERPL CALC-SCNC: 18 MMO/L — HIGH (ref 7–14)
AST SERPL-CCNC: 27 U/L — SIGNIFICANT CHANGE UP (ref 4–32)
BACTERIA BLD CULT: SIGNIFICANT CHANGE UP
BACTERIA BLD CULT: SIGNIFICANT CHANGE UP
BASOPHILS # BLD AUTO: 0.06 K/UL — SIGNIFICANT CHANGE UP (ref 0–0.2)
BASOPHILS NFR BLD AUTO: 0.6 % — SIGNIFICANT CHANGE UP (ref 0–2)
BILIRUB SERPL-MCNC: 0.5 MG/DL — SIGNIFICANT CHANGE UP (ref 0.2–1.2)
BUN SERPL-MCNC: 37 MG/DL — HIGH (ref 7–23)
CALCIUM SERPL-MCNC: 9.6 MG/DL — SIGNIFICANT CHANGE UP (ref 8.4–10.5)
CHLORIDE SERPL-SCNC: 104 MMOL/L — SIGNIFICANT CHANGE UP (ref 98–107)
CO2 SERPL-SCNC: 25 MMOL/L — SIGNIFICANT CHANGE UP (ref 22–31)
CREAT SERPL-MCNC: 0.84 MG/DL — SIGNIFICANT CHANGE UP (ref 0.5–1.3)
EOSINOPHIL # BLD AUTO: 0.4 K/UL — SIGNIFICANT CHANGE UP (ref 0–0.5)
EOSINOPHIL NFR BLD AUTO: 4 % — SIGNIFICANT CHANGE UP (ref 0–6)
GLUCOSE BLDC GLUCOMTR-MCNC: 103 MG/DL — HIGH (ref 70–99)
GLUCOSE BLDC GLUCOMTR-MCNC: 112 MG/DL — HIGH (ref 70–99)
GLUCOSE BLDC GLUCOMTR-MCNC: 113 MG/DL — HIGH (ref 70–99)
GLUCOSE BLDC GLUCOMTR-MCNC: 258 MG/DL — HIGH (ref 70–99)
GLUCOSE SERPL-MCNC: 108 MG/DL — HIGH (ref 70–99)
HCT VFR BLD CALC: 31.8 % — LOW (ref 34.5–45)
HGB BLD-MCNC: 9 G/DL — LOW (ref 11.5–15.5)
IMM GRANULOCYTES NFR BLD AUTO: 0.7 % — SIGNIFICANT CHANGE UP (ref 0–1.5)
LYMPHOCYTES # BLD AUTO: 2.09 K/UL — SIGNIFICANT CHANGE UP (ref 1–3.3)
LYMPHOCYTES # BLD AUTO: 21 % — SIGNIFICANT CHANGE UP (ref 13–44)
MAGNESIUM SERPL-MCNC: 2.1 MG/DL — SIGNIFICANT CHANGE UP (ref 1.6–2.6)
MCHC RBC-ENTMCNC: 28.3 % — LOW (ref 32–36)
MCHC RBC-ENTMCNC: 28.7 PG — SIGNIFICANT CHANGE UP (ref 27–34)
MCV RBC AUTO: 101.3 FL — HIGH (ref 80–100)
MONOCYTES # BLD AUTO: 0.88 K/UL — SIGNIFICANT CHANGE UP (ref 0–0.9)
MONOCYTES NFR BLD AUTO: 8.9 % — SIGNIFICANT CHANGE UP (ref 2–14)
NEUTROPHILS # BLD AUTO: 6.43 K/UL — SIGNIFICANT CHANGE UP (ref 1.8–7.4)
NEUTROPHILS NFR BLD AUTO: 64.8 % — SIGNIFICANT CHANGE UP (ref 43–77)
NRBC # FLD: 0.03 K/UL — LOW (ref 25–125)
PHOSPHATE SERPL-MCNC: 3.1 MG/DL — SIGNIFICANT CHANGE UP (ref 2.5–4.5)
PLATELET # BLD AUTO: 418 K/UL — HIGH (ref 150–400)
PMV BLD: 9.6 FL — SIGNIFICANT CHANGE UP (ref 7–13)
POTASSIUM SERPL-MCNC: 3.4 MMOL/L — LOW (ref 3.5–5.3)
POTASSIUM SERPL-SCNC: 3.4 MMOL/L — LOW (ref 3.5–5.3)
PROT SERPL-MCNC: 7.3 G/DL — SIGNIFICANT CHANGE UP (ref 6–8.3)
RBC # BLD: 3.14 M/UL — LOW (ref 3.8–5.2)
RBC # FLD: 19.1 % — HIGH (ref 10.3–14.5)
SODIUM SERPL-SCNC: 147 MMOL/L — HIGH (ref 135–145)
WBC # BLD: 9.93 K/UL — SIGNIFICANT CHANGE UP (ref 3.8–10.5)
WBC # FLD AUTO: 9.93 K/UL — SIGNIFICANT CHANGE UP (ref 3.8–10.5)

## 2019-02-27 PROCEDURE — 99291 CRITICAL CARE FIRST HOUR: CPT

## 2019-02-27 RX ORDER — TRAMADOL HYDROCHLORIDE 50 MG/1
50 TABLET ORAL
Qty: 0 | Refills: 0 | Status: DISCONTINUED | OUTPATIENT
Start: 2019-02-27 | End: 2019-02-28

## 2019-02-27 RX ORDER — SIMVASTATIN 20 MG/1
20 TABLET, FILM COATED ORAL AT BEDTIME
Qty: 0 | Refills: 0 | Status: DISCONTINUED | OUTPATIENT
Start: 2019-02-27 | End: 2019-03-20

## 2019-02-27 RX ORDER — POTASSIUM CHLORIDE 20 MEQ
40 PACKET (EA) ORAL ONCE
Qty: 0 | Refills: 0 | Status: DISCONTINUED | OUTPATIENT
Start: 2019-02-27 | End: 2019-02-27

## 2019-02-27 RX ORDER — POTASSIUM CHLORIDE 20 MEQ
10 PACKET (EA) ORAL ONCE
Qty: 0 | Refills: 0 | Status: DISCONTINUED | OUTPATIENT
Start: 2019-02-27 | End: 2019-02-27

## 2019-02-27 RX ORDER — SODIUM CHLORIDE 9 MG/ML
1000 INJECTION, SOLUTION INTRAVENOUS
Qty: 0 | Refills: 0 | Status: DISCONTINUED | OUTPATIENT
Start: 2019-02-27 | End: 2019-02-27

## 2019-02-27 RX ORDER — PANTOPRAZOLE SODIUM 20 MG/1
40 TABLET, DELAYED RELEASE ORAL
Qty: 0 | Refills: 0 | Status: DISCONTINUED | OUTPATIENT
Start: 2019-02-27 | End: 2019-03-20

## 2019-02-27 RX ORDER — POTASSIUM CHLORIDE 20 MEQ
10 PACKET (EA) ORAL ONCE
Qty: 0 | Refills: 0 | Status: COMPLETED | OUTPATIENT
Start: 2019-02-27 | End: 2019-02-27

## 2019-02-27 RX ORDER — LEVOTHYROXINE SODIUM 125 MCG
100 TABLET ORAL DAILY
Qty: 0 | Refills: 0 | Status: DISCONTINUED | OUTPATIENT
Start: 2019-02-27 | End: 2019-02-28

## 2019-02-27 RX ORDER — LEVOTHYROXINE SODIUM 125 MCG
100 TABLET ORAL AT BEDTIME
Qty: 0 | Refills: 0 | Status: DISCONTINUED | OUTPATIENT
Start: 2019-02-27 | End: 2019-02-27

## 2019-02-27 RX ORDER — FENTANYL CITRATE 50 UG/ML
0.5 INJECTION INTRAVENOUS
Qty: 2500 | Refills: 0 | Status: DISCONTINUED | OUTPATIENT
Start: 2019-02-27 | End: 2019-02-27

## 2019-02-27 RX ORDER — TRAMADOL HYDROCHLORIDE 50 MG/1
25 TABLET ORAL ONCE
Qty: 0 | Refills: 0 | Status: DISCONTINUED | OUTPATIENT
Start: 2019-02-27 | End: 2019-02-27

## 2019-02-27 RX ADMIN — Medication 1 APPLICATION(S): at 16:57

## 2019-02-27 RX ADMIN — TRAMADOL HYDROCHLORIDE 25 MILLIGRAM(S): 50 TABLET ORAL at 23:35

## 2019-02-27 RX ADMIN — FENTANYL CITRATE 1 PATCH: 50 INJECTION INTRAVENOUS at 20:13

## 2019-02-27 RX ADMIN — HEPARIN SODIUM 7500 UNIT(S): 5000 INJECTION INTRAVENOUS; SUBCUTANEOUS at 05:13

## 2019-02-27 RX ADMIN — FENTANYL CITRATE 5.61 MICROGRAM(S)/KG/HR: 50 INJECTION INTRAVENOUS at 08:12

## 2019-02-27 RX ADMIN — TRAMADOL HYDROCHLORIDE 50 MILLIGRAM(S): 50 TABLET ORAL at 18:02

## 2019-02-27 RX ADMIN — SIMVASTATIN 20 MILLIGRAM(S): 20 TABLET, FILM COATED ORAL at 21:51

## 2019-02-27 RX ADMIN — Medication 20 MILLIGRAM(S): at 21:52

## 2019-02-27 RX ADMIN — PANTOPRAZOLE SODIUM 40 MILLIGRAM(S): 20 TABLET, DELAYED RELEASE ORAL at 12:12

## 2019-02-27 RX ADMIN — Medication 6: at 12:13

## 2019-02-27 RX ADMIN — ERTAPENEM SODIUM 120 MILLIGRAM(S): 1 INJECTION, POWDER, LYOPHILIZED, FOR SOLUTION INTRAMUSCULAR; INTRAVENOUS at 05:13

## 2019-02-27 RX ADMIN — HEPARIN SODIUM 7500 UNIT(S): 5000 INJECTION INTRAVENOUS; SUBCUTANEOUS at 15:05

## 2019-02-27 RX ADMIN — HEPARIN SODIUM 7500 UNIT(S): 5000 INJECTION INTRAVENOUS; SUBCUTANEOUS at 21:52

## 2019-02-27 RX ADMIN — FENTANYL CITRATE 1 PATCH: 50 INJECTION INTRAVENOUS at 08:11

## 2019-02-27 RX ADMIN — SODIUM CHLORIDE 50 MILLILITER(S): 9 INJECTION, SOLUTION INTRAVENOUS at 08:10

## 2019-02-27 RX ADMIN — Medication 100 MILLIEQUIVALENT(S): at 08:28

## 2019-02-27 RX ADMIN — TRAMADOL HYDROCHLORIDE 50 MILLIGRAM(S): 50 TABLET ORAL at 18:29

## 2019-02-27 RX ADMIN — Medication 10 MILLIGRAM(S): at 05:13

## 2019-02-27 RX ADMIN — DEXMEDETOMIDINE HYDROCHLORIDE IN 0.9% SODIUM CHLORIDE 5.61 MICROGRAM(S)/KG/HR: 4 INJECTION INTRAVENOUS at 08:11

## 2019-02-27 NOTE — PROGRESS NOTE ADULT - ATTENDING COMMENTS
doing well all things considered, severe hypoxemia has started to improve now on continuous NIV and coming down off the drips/guarded but improved

## 2019-02-27 NOTE — CHART NOTE - NSCHARTNOTEFT_GEN_A_CORE
MICU Transfer Note    Transfer from: MICU  Transfer to:  (  ) Medicine    (  ) Telemetry    (x) RCU    (  ) Palliative    (  ) Stroke Unit    (  ) _______________  Accepting physican:    Original MICU Accept Note: (1/29)  64 y/o female with PMH of HTN, DM, hypothyroidism, RA (10mg prednisone), pulmonary HTN, CHRIS, and COPD (3L at home) who was originally admitted on 12/15 for SBO. Underwent ex lap wit lysis of adhesions on 12/16 after which time she was transferred to the SICU for delayed extubation. Was transferred to the floors after extubation when on 12/30 she eviscerated 2/2 a coughing episode, went her second ex lap and again returned to the SICU for delayed extubation. Of note, 10 to 12 retention sutures were placed over the course of the two surgeries. Was then transferred to RCU for multifocal pneumonia where she was placed on BIPAP. There developed ARDS and returned to the SICU where she was re-intubated on 1/6. She has since failed multiple attempts at extubation and both the pt and family (Son, [place], is healthcare proxy) are reportedly refusing trach, but eventually extubated on 1/24. Of note she was flu positive on 1/3.     Her hospital course was complicated by E. Faecium bacteremia for which she has been on Vancomycin. Blood cultures cleared on 1/17; however she spiked fever on 1/29 in RCU and Zosyn was subsequently added. In RCU, patient requiring HiFlo during day and AVAPS qHS; however developed increased work of breathing and desaturated to 67%. Patient was given Lasix 80 IVP with improvement in respiratory status and transferred to MICU on BiPaP for closer monitoring.       MICU Course:  After extubation on 1/24 she was readmitted to the MICU on 1/29 for hypoxic respiratory failure 2/2 atelectasis requiring reintubation on 1/31. On 2/14 she had grown ESBL in the urine and completed course of ertapenem. She is also now off of norepinephrine. Her SC line was replaced with a TLC on 2/24. As she required pressors her lasix and metolazone were held. She was then extubated to BIPAP on 2/26 and has now transitioned to high flow nasal cannula. She was maintained on fentanyl and precedex for pain control and reduced anxiety, and was transitioned to tramadol and fentanyl patch.       ASSESSMENT & PLAN:     63 year old female with PMHx of HTN, DM, hypothyroidism, RA (prednisone 10mg), pulmonary HTN, CHRIS, and COPD (3L at home) who was originally admitted on 12/15 for SBO s/p ex lap with lysis of adhesions (4 retention sutures) c/b 12/30 she eviscerated 2/2 a coughing episode s/p ex lap (6 retention sutures) c/b multifocal pneumonia, developed hypercapnic respiratory failure requiring intubation 1/6, s/p bronch 1/9/18, and extubated on 1/24, course further c/b Enterococci bacteremia now readmitted to MICU for hypoxic respiratory failure 2/2 atelectasis requiring intubation 01/31. Had grown ESBL in urine, now on ertapenem and levophed.     #Neuro:   - Extubated on 2/26   - Currently on fentanyl and precedex on low doses, switching to fentanyl patch. Wean as tolerated.   - AxOx3    # Skin: TLC IJ line 2/24  - May need PIV     #GI  - SBO s/p ex-lap with findings of healthy bowel c/b evisceration of bowel requiring placement of retention sutures  - NPO until can tolerate briefly off BIPAP for bedside speech assessment  - Continue Protonix    #Renal/Metabolic  - Monitor for electrolyte derangements in the setting of diuresis; replete as needed  - trend BMP for BUN/Cr  - Holding lasix and metolazone given previous pressor requirement. Overall net negative in last 24 hours, despite holding diuresis.   - Will need to readdress tomorrow if positive fluid balance.     #Endocrine  1) Diabetes Mellitus Type 2  - c/w lantus 25 qhs with sliding scale. Doses have been held.   - Gabapentin 400 TID held for now   - Endocrinology recommendations appreciated    2) Hypothyroidism   - c/w 100 mcg levothyroxine IV     3.) Rheumatoid arthritis:   - Continue solumedrol 10 daily, however monitor FSGs closely for hyperglycemia    # Volume status:   - Overall net negative, despite holding lasix 40 Q8 and metolazone    # Heme:  - Hemoglobin improved, likely from blood draws and bone marrow suppression  - s/p 1 unit pRBCs (2/20)  - WBC ct improving today    #DVT ppx  - Heparin 7500 SQ TID    #ID   - Completed ertapenem  - Blood cultures NGTD, resending sputum culture  - Had exchanged iraheta when initially had ESBL about 1 week ago  - Off previous antibiotics as per discussion w/ ID - had 27 days of Vanc, 3 days of Ceftriaxone, and 8 days of Zosyn for enterococcal bacteremia and Klebsiella sputum culture   - Urine cx with ESBL 10-50k colonies and candida, potentially colonized  - C diff negative    #Cardiovascular  1) Previously reported to have cardiogenic shock. Overloaded currently  - Overall net positive in last 24 hours given hypotension  - Continue to hold home blood pressure medications  - c/w Simvastatin 20mg   - Holding lasix 40 Q8 with metolazone daily    #Respiratory   1) Hypoxic Respiratory Failure 2/2 atelectasis requiring intubation 1/31.   - Concern for prolonged intubation and deconditioning. Has had prolonged hospital course with multiple intubations. Previous bronchoscopy w/ findings concerning for hyperdynamic airway collapse.   - Now extubated on BIPAP 15/8. Will attempt high flow.   - C/w sildenafil 20 TID, now off NO.   - Continue solumedrol 10 mg daily   - Extubated on 2/26  - Ethics consult appreciated.     # Ethics:   - [facility] discussion ongoing with Palliative Care team. Pending reconsult.   - Family requesting updated HCP, however patient remains on fentanyl and precedex to keep her calm. Will readdress capacity if able to taper off sedation.   - Family requesting transfer to [facility] for second opinion. Curtis Pisano plans to obtain accepting physician. Once accepting physician is obtained, primary team will need to call transfer center per policy.   - Curtis Pisano requests that no information be given to patient without himself present.        For Follow-Up:          Vital Signs Last 24 Hrs  T(C): 36.9 (27 Feb 2019 12:00), Max: 37.1 (26 Feb 2019 20:00)  T(F): 98.4 (27 Feb 2019 12:00), Max: 98.8 (27 Feb 2019 00:00)  HR: 83 (27 Feb 2019 16:00) (74 - 97)  BP: 110/77 (27 Feb 2019 15:01) (90/62 - 138/91)  BP(mean): 87 (27 Feb 2019 15:01) (71 - 106)  RR: 24 (27 Feb 2019 16:00) (15 - 26)  SpO2: 100% (27 Feb 2019 16:00) (93% - 100%)  I&O's Summary    26 Feb 2019 07:01  -  27 Feb 2019 07:00  --------------------------------------------------------  IN: 439.2 mL / OUT: 1565 mL / NET: -1125.8 mL    27 Feb 2019 07:01  -  27 Feb 2019 16:46  --------------------------------------------------------  IN: 478.4 mL / OUT: 375 mL / NET: 103.4 mL          MEDICATIONS  (STANDING):  collagenase Ointment 1 Application(s) Topical daily  dextrose 5%. 1000 milliLiter(s) (50 mL/Hr) IV Continuous <Continuous>  dextrose 5%. 1000 milliLiter(s) (50 mL/Hr) IV Continuous <Continuous>  dextrose 50% Injectable 12.5 Gram(s) IV Push once  dextrose 50% Injectable 25 Gram(s) IV Push once  dextrose 50% Injectable 25 Gram(s) IV Push once  fentaNYL   Patch  50 MICROgram(s)/Hr 1 Patch Transdermal every 72 hours  heparin  Injectable 7500 Unit(s) SubCutaneous every 8 hours  influenza   Vaccine 0.5 milliLiter(s) IntraMuscular once  insulin glargine Injectable (LANTUS) 18 Unit(s) SubCutaneous at bedtime  insulin lispro (HumaLOG) corrective regimen sliding scale   SubCutaneous every 6 hours  levothyroxine Injectable 100 MICROGram(s) IV Push at bedtime  methylPREDNISolone sodium succinate Injectable 10 milliGRAM(s) IV Push daily  pantoprazole  Injectable 40 milliGRAM(s) IV Push daily  sildenafil (REVATIO) 20 milliGRAM(s) Oral three times a day  simvastatin 20 milliGRAM(s) Oral at bedtime  traMADol 50 milliGRAM(s) Oral two times a day    MEDICATIONS  (PRN):  acetaminophen    Suspension .. 650 milliGRAM(s) Oral every 6 hours PRN Temp greater or equal to 38C (100.4F), Mild Pain (1 - 3), Moderate Pain (4 - 6)  dextrose 40% Gel 15 Gram(s) Oral once PRN Blood Glucose LESS THAN 70 milliGRAM(s)/deciliter  glucagon  Injectable 1 milliGRAM(s) IntraMuscular once PRN Glucose LESS THAN 70 milligrams/deciliter  glucagon  Injectable 1 milliGRAM(s) IntraMuscular once PRN Glucose LESS THAN 70 milligrams/deciliter        LABS                                            9.0                   Neurophils% (auto):   64.8   (02-27 @ 04:40):    9.93 )-----------(418          Lymphocytes% (auto):  21.0                                          31.8                   Eosinphils% (auto):   4.0      Manual%: Neutrophils x    ; Lymphocytes x    ; Eosinophils x    ; Bands%: x    ; Blasts x                                    147    |  104    |  37                  Calcium: 9.6   / iCa: x      (02-27 @ 04:40)    ----------------------------<  108       Magnesium: 2.1                              3.4     |  25     |  0.84             Phosphorous: 3.1      TPro  7.3    /  Alb  3.5    /  TBili  0.5    /  DBili  x      /  AST  27     /  ALT  24     /  AlkPhos  123    27 Feb 2019 04:40

## 2019-02-28 DIAGNOSIS — E11.9 TYPE 2 DIABETES MELLITUS WITHOUT COMPLICATIONS: ICD-10-CM

## 2019-02-28 DIAGNOSIS — A49.9 BACTERIAL INFECTION, UNSPECIFIED: ICD-10-CM

## 2019-02-28 LAB
ALBUMIN SERPL ELPH-MCNC: 3.5 G/DL — SIGNIFICANT CHANGE UP (ref 3.3–5)
ALP SERPL-CCNC: 124 U/L — HIGH (ref 40–120)
ALT FLD-CCNC: 30 U/L — SIGNIFICANT CHANGE UP (ref 4–33)
ANION GAP SERPL CALC-SCNC: 19 MMO/L — HIGH (ref 7–14)
APPEARANCE UR: CLEAR — SIGNIFICANT CHANGE UP
AST SERPL-CCNC: 33 U/L — HIGH (ref 4–32)
BACTERIA # UR AUTO: NEGATIVE — SIGNIFICANT CHANGE UP
BASOPHILS # BLD AUTO: 0.07 K/UL — SIGNIFICANT CHANGE UP (ref 0–0.2)
BASOPHILS NFR BLD AUTO: 0.6 % — SIGNIFICANT CHANGE UP (ref 0–2)
BILIRUB SERPL-MCNC: 0.5 MG/DL — SIGNIFICANT CHANGE UP (ref 0.2–1.2)
BILIRUB UR-MCNC: NEGATIVE — SIGNIFICANT CHANGE UP
BLOOD UR QL VISUAL: NEGATIVE — SIGNIFICANT CHANGE UP
BUN SERPL-MCNC: 26 MG/DL — HIGH (ref 7–23)
CALCIUM SERPL-MCNC: 9.3 MG/DL — SIGNIFICANT CHANGE UP (ref 8.4–10.5)
CHLORIDE SERPL-SCNC: 100 MMOL/L — SIGNIFICANT CHANGE UP (ref 98–107)
CO2 SERPL-SCNC: 20 MMOL/L — LOW (ref 22–31)
COLOR SPEC: YELLOW — SIGNIFICANT CHANGE UP
CREAT SERPL-MCNC: 0.74 MG/DL — SIGNIFICANT CHANGE UP (ref 0.5–1.3)
EOSINOPHIL # BLD AUTO: 0.59 K/UL — HIGH (ref 0–0.5)
EOSINOPHIL NFR BLD AUTO: 5.2 % — SIGNIFICANT CHANGE UP (ref 0–6)
GLUCOSE BLDC GLUCOMTR-MCNC: 111 MG/DL — HIGH (ref 70–99)
GLUCOSE BLDC GLUCOMTR-MCNC: 192 MG/DL — HIGH (ref 70–99)
GLUCOSE BLDC GLUCOMTR-MCNC: 197 MG/DL — HIGH (ref 70–99)
GLUCOSE BLDC GLUCOMTR-MCNC: 203 MG/DL — HIGH (ref 70–99)
GLUCOSE BLDC GLUCOMTR-MCNC: 233 MG/DL — HIGH (ref 70–99)
GLUCOSE SERPL-MCNC: 139 MG/DL — HIGH (ref 70–99)
GLUCOSE UR-MCNC: NEGATIVE — SIGNIFICANT CHANGE UP
HCT VFR BLD CALC: 31.6 % — LOW (ref 34.5–45)
HGB BLD-MCNC: 9.2 G/DL — LOW (ref 11.5–15.5)
HYALINE CASTS # UR AUTO: SIGNIFICANT CHANGE UP
IMM GRANULOCYTES NFR BLD AUTO: 1 % — SIGNIFICANT CHANGE UP (ref 0–1.5)
KETONES UR-MCNC: SIGNIFICANT CHANGE UP
LEUKOCYTE ESTERASE UR-ACNC: SIGNIFICANT CHANGE UP
LYMPHOCYTES # BLD AUTO: 2.54 K/UL — SIGNIFICANT CHANGE UP (ref 1–3.3)
LYMPHOCYTES # BLD AUTO: 22.2 % — SIGNIFICANT CHANGE UP (ref 13–44)
MAGNESIUM SERPL-MCNC: 1.8 MG/DL — SIGNIFICANT CHANGE UP (ref 1.6–2.6)
MCHC RBC-ENTMCNC: 28.6 PG — SIGNIFICANT CHANGE UP (ref 27–34)
MCHC RBC-ENTMCNC: 29.1 % — LOW (ref 32–36)
MCV RBC AUTO: 98.1 FL — SIGNIFICANT CHANGE UP (ref 80–100)
MONOCYTES # BLD AUTO: 1.2 K/UL — HIGH (ref 0–0.9)
MONOCYTES NFR BLD AUTO: 10.5 % — SIGNIFICANT CHANGE UP (ref 2–14)
NEUTROPHILS # BLD AUTO: 6.92 K/UL — SIGNIFICANT CHANGE UP (ref 1.8–7.4)
NEUTROPHILS NFR BLD AUTO: 60.5 % — SIGNIFICANT CHANGE UP (ref 43–77)
NITRITE UR-MCNC: NEGATIVE — SIGNIFICANT CHANGE UP
NRBC # FLD: 0 K/UL — LOW (ref 25–125)
PH UR: 6 — SIGNIFICANT CHANGE UP (ref 5–8)
PHOSPHATE SERPL-MCNC: 2.3 MG/DL — LOW (ref 2.5–4.5)
PLATELET # BLD AUTO: 467 K/UL — HIGH (ref 150–400)
PMV BLD: 10.2 FL — SIGNIFICANT CHANGE UP (ref 7–13)
POTASSIUM SERPL-MCNC: 3.5 MMOL/L — SIGNIFICANT CHANGE UP (ref 3.5–5.3)
POTASSIUM SERPL-SCNC: 3.5 MMOL/L — SIGNIFICANT CHANGE UP (ref 3.5–5.3)
PROT SERPL-MCNC: 7.3 G/DL — SIGNIFICANT CHANGE UP (ref 6–8.3)
PROT UR-MCNC: 30 — SIGNIFICANT CHANGE UP
RBC # BLD: 3.22 M/UL — LOW (ref 3.8–5.2)
RBC # FLD: 18.5 % — HIGH (ref 10.3–14.5)
RBC CASTS # UR COMP ASSIST: HIGH (ref 0–?)
SODIUM SERPL-SCNC: 139 MMOL/L — SIGNIFICANT CHANGE UP (ref 135–145)
SP GR SPEC: 1.02 — SIGNIFICANT CHANGE UP (ref 1–1.04)
SQUAMOUS # UR AUTO: SIGNIFICANT CHANGE UP
UROBILINOGEN FLD QL: SIGNIFICANT CHANGE UP
WBC # BLD: 11.43 K/UL — HIGH (ref 3.8–10.5)
WBC # FLD AUTO: 11.43 K/UL — HIGH (ref 3.8–10.5)
WBC UR QL: >50 — HIGH (ref 0–?)

## 2019-02-28 PROCEDURE — 99232 SBSQ HOSP IP/OBS MODERATE 35: CPT

## 2019-02-28 PROCEDURE — 99233 SBSQ HOSP IP/OBS HIGH 50: CPT | Mod: GC

## 2019-02-28 RX ORDER — DEXTROSE 50 % IN WATER 50 %
25 SYRINGE (ML) INTRAVENOUS ONCE
Qty: 0 | Refills: 0 | Status: DISCONTINUED | OUTPATIENT
Start: 2019-02-28 | End: 2019-03-20

## 2019-02-28 RX ORDER — LEVOTHYROXINE SODIUM 125 MCG
137 TABLET ORAL
Qty: 0 | Refills: 0 | Status: DISCONTINUED | OUTPATIENT
Start: 2019-03-01 | End: 2019-03-20

## 2019-02-28 RX ORDER — ACETAMINOPHEN 500 MG
1000 TABLET ORAL ONCE
Qty: 0 | Refills: 0 | Status: COMPLETED | OUTPATIENT
Start: 2019-02-28 | End: 2019-02-28

## 2019-02-28 RX ORDER — GLUCAGON INJECTION, SOLUTION 0.5 MG/.1ML
1 INJECTION, SOLUTION SUBCUTANEOUS ONCE
Qty: 0 | Refills: 0 | Status: DISCONTINUED | OUTPATIENT
Start: 2019-02-28 | End: 2019-03-20

## 2019-02-28 RX ORDER — TRAMADOL HYDROCHLORIDE 50 MG/1
25 TABLET ORAL ONCE
Qty: 0 | Refills: 0 | Status: DISCONTINUED | OUTPATIENT
Start: 2019-02-28 | End: 2019-02-28

## 2019-02-28 RX ORDER — TRAMADOL HYDROCHLORIDE 50 MG/1
50 TABLET ORAL
Qty: 0 | Refills: 0 | Status: DISCONTINUED | OUTPATIENT
Start: 2019-02-28 | End: 2019-03-07

## 2019-02-28 RX ORDER — FENTANYL CITRATE 50 UG/ML
1 INJECTION INTRAVENOUS
Qty: 0 | Refills: 0 | Status: DISCONTINUED | OUTPATIENT
Start: 2019-02-28 | End: 2019-03-06

## 2019-02-28 RX ORDER — DEXTROSE 50 % IN WATER 50 %
12.5 SYRINGE (ML) INTRAVENOUS ONCE
Qty: 0 | Refills: 0 | Status: DISCONTINUED | OUTPATIENT
Start: 2019-02-28 | End: 2019-03-20

## 2019-02-28 RX ORDER — INSULIN GLARGINE 100 [IU]/ML
20 INJECTION, SOLUTION SUBCUTANEOUS AT BEDTIME
Qty: 0 | Refills: 0 | Status: DISCONTINUED | OUTPATIENT
Start: 2019-02-28 | End: 2019-03-07

## 2019-02-28 RX ORDER — DEXTROSE 50 % IN WATER 50 %
15 SYRINGE (ML) INTRAVENOUS ONCE
Qty: 0 | Refills: 0 | Status: DISCONTINUED | OUTPATIENT
Start: 2019-02-28 | End: 2019-03-20

## 2019-02-28 RX ORDER — INSULIN LISPRO 100/ML
2 VIAL (ML) SUBCUTANEOUS
Qty: 0 | Refills: 0 | Status: DISCONTINUED | OUTPATIENT
Start: 2019-02-28 | End: 2019-03-01

## 2019-02-28 RX ORDER — INSULIN LISPRO 100/ML
VIAL (ML) SUBCUTANEOUS
Qty: 0 | Refills: 0 | Status: DISCONTINUED | OUTPATIENT
Start: 2019-02-28 | End: 2019-03-01

## 2019-02-28 RX ORDER — SODIUM CHLORIDE 9 MG/ML
1000 INJECTION, SOLUTION INTRAVENOUS
Qty: 0 | Refills: 0 | Status: DISCONTINUED | OUTPATIENT
Start: 2019-02-28 | End: 2019-03-20

## 2019-02-28 RX ADMIN — SIMVASTATIN 20 MILLIGRAM(S): 20 TABLET, FILM COATED ORAL at 22:04

## 2019-02-28 RX ADMIN — FENTANYL CITRATE 1 PATCH: 50 INJECTION INTRAVENOUS at 11:14

## 2019-02-28 RX ADMIN — TRAMADOL HYDROCHLORIDE 50 MILLIGRAM(S): 50 TABLET ORAL at 06:49

## 2019-02-28 RX ADMIN — FENTANYL CITRATE 1 PATCH: 50 INJECTION INTRAVENOUS at 08:19

## 2019-02-28 RX ADMIN — Medication 20 MILLIGRAM(S): at 06:20

## 2019-02-28 RX ADMIN — TRAMADOL HYDROCHLORIDE 50 MILLIGRAM(S): 50 TABLET ORAL at 22:05

## 2019-02-28 RX ADMIN — FENTANYL CITRATE 1 PATCH: 50 INJECTION INTRAVENOUS at 11:15

## 2019-02-28 RX ADMIN — TRAMADOL HYDROCHLORIDE 50 MILLIGRAM(S): 50 TABLET ORAL at 22:37

## 2019-02-28 RX ADMIN — Medication 100 MICROGRAM(S): at 06:20

## 2019-02-28 RX ADMIN — HEPARIN SODIUM 7500 UNIT(S): 5000 INJECTION INTRAVENOUS; SUBCUTANEOUS at 06:20

## 2019-02-28 RX ADMIN — HEPARIN SODIUM 7500 UNIT(S): 5000 INJECTION INTRAVENOUS; SUBCUTANEOUS at 22:04

## 2019-02-28 RX ADMIN — Medication 2: at 18:03

## 2019-02-28 RX ADMIN — INSULIN GLARGINE 20 UNIT(S): 100 INJECTION, SOLUTION SUBCUTANEOUS at 22:06

## 2019-02-28 RX ADMIN — Medication 4: at 11:15

## 2019-02-28 RX ADMIN — Medication 20 MILLIGRAM(S): at 22:04

## 2019-02-28 RX ADMIN — HEPARIN SODIUM 7500 UNIT(S): 5000 INJECTION INTRAVENOUS; SUBCUTANEOUS at 13:03

## 2019-02-28 RX ADMIN — Medication 10 MILLIGRAM(S): at 06:20

## 2019-02-28 RX ADMIN — Medication 2 UNIT(S): at 18:03

## 2019-02-28 RX ADMIN — FENTANYL CITRATE 1 PATCH: 50 INJECTION INTRAVENOUS at 19:18

## 2019-02-28 RX ADMIN — Medication 400 MILLIGRAM(S): at 06:57

## 2019-02-28 RX ADMIN — TRAMADOL HYDROCHLORIDE 25 MILLIGRAM(S): 50 TABLET ORAL at 01:14

## 2019-02-28 RX ADMIN — Medication 1 APPLICATION(S): at 11:14

## 2019-02-28 RX ADMIN — PANTOPRAZOLE SODIUM 40 MILLIGRAM(S): 20 TABLET, DELAYED RELEASE ORAL at 06:19

## 2019-02-28 RX ADMIN — Medication 4: at 08:37

## 2019-02-28 RX ADMIN — Medication 20 MILLIGRAM(S): at 13:03

## 2019-02-28 RX ADMIN — TRAMADOL HYDROCHLORIDE 50 MILLIGRAM(S): 50 TABLET ORAL at 11:16

## 2019-02-28 RX ADMIN — Medication 1 MILLIGRAM(S): at 02:35

## 2019-02-28 RX ADMIN — TRAMADOL HYDROCHLORIDE 50 MILLIGRAM(S): 50 TABLET ORAL at 11:56

## 2019-02-28 RX ADMIN — TRAMADOL HYDROCHLORIDE 25 MILLIGRAM(S): 50 TABLET ORAL at 01:44

## 2019-02-28 RX ADMIN — TRAMADOL HYDROCHLORIDE 25 MILLIGRAM(S): 50 TABLET ORAL at 00:35

## 2019-02-28 RX ADMIN — TRAMADOL HYDROCHLORIDE 50 MILLIGRAM(S): 50 TABLET ORAL at 06:19

## 2019-02-28 NOTE — PROGRESS NOTE ADULT - ASSESSMENT
63 year old female with PMHx of HTN, DM, hypothyroidism, RA (prednisone 10mg), pulmonary HTN, CHRIS, and COPD (3L at home) who was originally admitted on 12/15 for SBO s/p ex lap with lysis of adhesions (4 retention sutures) c/b 12/30 she eviscerated 2/2 a coughing episode s/p ex lap (6 retention sutures) c/b multifocal pneumonia, developed hypercapnic respiratory failure requiring intubation 1/6, s/p bronch 1/9/18, and extubated on 1/24, course further c/b Enterococci bacteremia now readmitted to MICU for hypoxic respiratory failure 2/2 atelectasis requiring intubation 01/31. Had grown ESBL in urine, now on ertapenem and levophed.

## 2019-02-28 NOTE — PROGRESS NOTE ADULT - PROBLEM SELECTOR PLAN 1
BG elevated above goal of 140-180 mg/dl.-   increased lantus to 20 units s qqhs  start humalog 2 units sq tid ac  and continue moderate dose humalog correctional scale q6h.  will trend and f/u

## 2019-02-28 NOTE — PROGRESS NOTE ADULT - SUBJECTIVE AND OBJECTIVE BOX
History: patient is currenly on high flow o2, eating soft mechanical.  BG's trending higher    MEDICATIONS  (STANDING):  collagenase Ointment 1 Application(s) Topical daily  dextrose 5%. 1000 milliLiter(s) (50 mL/Hr) IV Continuous <Continuous>  dextrose 50% Injectable 12.5 Gram(s) IV Push once  dextrose 50% Injectable 25 Gram(s) IV Push once  dextrose 50% Injectable 25 Gram(s) IV Push once  fentaNYL   Patch  50 MICROgram(s)/Hr 1 Patch Transdermal every 72 hours  heparin  Injectable 7500 Unit(s) SubCutaneous every 8 hours  insulin glargine Injectable (LANTUS) 20 Unit(s) SubCutaneous at bedtime  insulin lispro (HumaLOG) corrective regimen sliding scale   SubCutaneous Before meals and at bedtime  insulin lispro Injectable (HumaLOG) 2 Unit(s) SubCutaneous three times a day before meals  levothyroxine 100 MICROGram(s) Oral daily  methylPREDNISolone sodium succinate Injectable 10 milliGRAM(s) IV Push daily  pantoprazole    Tablet 40 milliGRAM(s) Oral before breakfast  sildenafil (REVATIO) 20 milliGRAM(s) Oral three times a day  simvastatin 20 milliGRAM(s) Oral at bedtime  traMADol 50 milliGRAM(s) Oral two times a day      MEDICATIONS  (PRN):  acetaminophen    Suspension .. 650 milliGRAM(s) Oral every 6 hours PRN Temp greater or equal to 38C (100.4F), Mild Pain (1 - 3), Moderate Pain (4 - 6)  dextrose 40% Gel 15 Gram(s) Oral once PRN Blood Glucose LESS THAN 70 milliGRAM(s)/deciliter  glucagon  Injectable 1 milliGRAM(s) IntraMuscular once PRN Glucose LESS THAN 70 milligrams/deciliter  glucagon  Injectable 1 milliGRAM(s) IntraMuscular once PRN Glucose LESS THAN 70 milligrams/deciliter      Allergies    No Known Allergies    Intolerances    Seroquel (Other)        PHYSICAL EXAM:  Vital Signs Last 24 Hrs  T(C): 37.1 (28 Feb 2019 13:03), Max: 38.3 (28 Feb 2019 06:05)  T(F): 98.7 (28 Feb 2019 13:03), Max: 101 (28 Feb 2019 06:05)  HR: 114 (28 Feb 2019 13:03) (83 - 128)  BP: 125/76 (28 Feb 2019 13:03) (100/72 - 141/76)  BP(mean): 94 (27 Feb 2019 22:00) (80 - 99)  RR: 24 (28 Feb 2019 13:03) (15 - 26)  SpO2: 99% (28 Feb 2019 13:03) (92% - 100%)  GENERAL: acutely ill, on high flow o2  GI: Soft, nontender, non distended  PSYCH: alert, making needs known        CAPILLARY BLOOD GLUCOSE      POCT Blood Glucose.: 233 mg/dL (28 Feb 2019 11:13)  POCT Blood Glucose.: 203 mg/dL (28 Feb 2019 08:33)  POCT Blood Glucose.: 192 mg/dL (28 Feb 2019 08:07)  POCT Blood Glucose.: 112 mg/dL (27 Feb 2019 21:55)  POCT Blood Glucose.: 113 mg/dL (27 Feb 2019 18:02)    POCT Blood Glucose.: 203 mg/dL (26 Feb 2019 11:31)  POCT Blood Glucose.: 265 mg/dL (26 Feb 2019 06:32)  POCT Blood Glucose.: 118 mg/dL (25 Feb 2019 23:08)  POCT Blood Glucose.: 207 mg/dL (25 Feb 2019 18:24)    POCT Blood Glucose.: 366 mg/dL (25 Feb 2019 12:21)  POCT Blood Glucose.: 191 mg/dL (25 Feb 2019 05:43)  POCT Blood Glucose.: 148 mg/dL (25 Feb 2019 00:18)  POCT Blood Glucose.: 168 mg/dL (24 Feb 2019 17:10)    POCT Blood Glucose.: 168 mg/dL (02-24-19 @ 17:10)  POCT Blood Glucose.: 308 mg/dL (02-24-19 @ 12:09)  POCT Blood Glucose.: 227 mg/dL (02-24-19 @ 06:06)  POCT Blood Glucose.: 221 mg/dL (02-24-19 @ 00:15)  POCT Blood Glucose.: 180 mg/dL (02-23-19 @ 17:56)  POCT Blood Glucose.: 313 mg/dL (02-23-19 @ 11:58)  POCT Blood Glucose.: 163 mg/dL (02-23-19 @ 06:09)  POCT Blood Glucose.: 137 mg/dL (02-23-19 @ 00:23)  POCT Blood Glucose.: 146 mg/dL (02-22-19 @ 17:42)  POCT Blood Glucose.: 321 mg/dL (02-22-19 @ 13:15)  POCT Blood Glucose.: 130 mg/dL (02-22-19 @ 05:44)  POCT Blood Glucose.: 112 mg/dL (02-21-19 @ 23:58)  POCT Blood Glucose.: 129 mg/dL (02-21-19 @ 17:57)      02-24    139  |  92<L>  |  52<H>  ----------------------------<  239<H>  3.9   |  26  |  1.24    EGFR if : 54  EGFR if non : 47    Ca    10.3      02-24  Mg     2.2     02-24  Phos  5.6     02-24    TPro  7.7  /  Alb  3.6  /  TBili  0.5  /  DBili  x   /  AST  31  /  ALT  27  /  AlkPhos  167<H>  02-24        Thyroid Function Tests:      Hemoglobin A1C, Whole Blood: 7.1 % <H> [4.0 - 5.6] (12-17-18 @ 03:50)  Hemoglobin A1C, Whole Blood: 7.3 % <H> [4.0 - 5.6] (12-16-18 @ 04:17)

## 2019-02-28 NOTE — PROGRESS NOTE ADULT - SUBJECTIVE AND OBJECTIVE BOX
CHIEF COMPLAINT: Patient is a 63y old  Female who presents with a chief complaint of small bowel resection with complications (26 Feb 2019 09:00)    Interval Events:      REVIEW OF SYSTEMS:  Constitutional:   Eyes:  ENT:  CV:  Resp:  GI:  :  MSK:  Integumentary:  Neurological:  Psychiatric:  Endocrine:  Hematologic/Lymphatic:  Allergic/Immunologic:  [ ] All other systems negative  [ ] Unable to assess ROS because ________      OBJECTIVE:  ICU Vital Signs Last 24 Hrs  T(C): 38.3 (28 Feb 2019 06:05), Max: 38.3 (28 Feb 2019 06:05)  T(F): 101 (28 Feb 2019 06:05), Max: 101 (28 Feb 2019 06:05)  HR: 128 (28 Feb 2019 06:48) (83 - 128)  BP: 120/55 (28 Feb 2019 06:05) (100/72 - 141/76)  BP(mean): 94 (27 Feb 2019 22:00) (80 - 106)  ABP: --  ABP(mean): --  RR: 21 (28 Feb 2019 06:48) (15 - 26)  SpO2: 96% (28 Feb 2019 06:48) (96% - 100%)    02-27 @ 07:01  -  02-28 @ 07:00  --------------------------------------------------------  IN: 878.4 mL / OUT: 1100 mL / NET: -221.6 mL    POCT Blood Glucose.: 112 mg/dL (27 Feb 2019 21:55)    HOSPITAL MEDICATIONS:  MEDICATIONS  (STANDING):  collagenase Ointment 1 Application(s) Topical daily  fentaNYL   Patch  50 MICROgram(s)/Hr 1 Patch Transdermal every 72 hours  heparin  Injectable 7500 Unit(s) SubCutaneous every 8 hours  insulin glargine Injectable (LANTUS) 18 Unit(s) SubCutaneous at bedtime  insulin lispro (HumaLOG) corrective regimen sliding scale   SubCutaneous Before meals and at bedtime  levothyroxine 100 MICROGram(s) Oral daily  methylPREDNISolone sodium succinate Injectable 10 milliGRAM(s) IV Push daily  pantoprazole    Tablet 40 milliGRAM(s) Oral before breakfast  sildenafil (REVATIO) 20 milliGRAM(s) Oral three times a day  simvastatin 20 milliGRAM(s) Oral at bedtime  traMADol 50 milliGRAM(s) Oral two times a day    MEDICATIONS  (PRN):      LABS:                        9.0    9.93  )-----------( 418      ( 27 Feb 2019 04:40 )             31.8     02-27    147<H>  |  104  |  37<H>  ----------------------------<  108<H>  3.4<L>   |  25  |  0.84    Ca    9.6      27 Feb 2019 04:40  Phos  3.1     02-27  Mg     2.1     02-27    TPro  7.3  /  Alb  3.5  /  TBili  0.5  /  DBili  x   /  AST  27  /  ALT  24  /  AlkPhos  123<H>  02-27              MICROBIOLOGY:     RADIOLOGY:  [ ] Reviewed and interpreted by me    PULMONARY FUNCTION TESTS:    EKG: CHIEF COMPLAINT: Patient is a 63y old  Female who presents with a chief complaint of small bowel resection with complications (26 Feb 2019 09:00)    Interval Events: tx from MICU overnight      REVIEW OF SYSTEMS:  Constitutional: c/o generalized pain  CV: denies  Resp: SOB  GI: denies  [x] All other systems negative  [ ] Unable to assess ROS because ________      OBJECTIVE:  ICU Vital Signs Last 24 Hrs  T(C): 38.3 (28 Feb 2019 06:05), Max: 38.3 (28 Feb 2019 06:05)  T(F): 101 (28 Feb 2019 06:05), Max: 101 (28 Feb 2019 06:05)  HR: 128 (28 Feb 2019 06:48) (83 - 128)  BP: 120/55 (28 Feb 2019 06:05) (100/72 - 141/76)  BP(mean): 94 (27 Feb 2019 22:00) (80 - 106)  ABP: --  ABP(mean): --  RR: 21 (28 Feb 2019 06:48) (15 - 26)  SpO2: 96% (28 Feb 2019 06:48) (96% - 100%)    02-27 @ 07:01  -  02-28 @ 07:00  --------------------------------------------------------  IN: 878.4 mL / OUT: 1100 mL / NET: -221.6 mL    POCT Blood Glucose.: 112 mg/dL (27 Feb 2019 21:55)    HOSPITAL MEDICATIONS:  MEDICATIONS  (STANDING):  collagenase Ointment 1 Application(s) Topical daily  fentaNYL   Patch  50 MICROgram(s)/Hr 1 Patch Transdermal every 72 hours  heparin  Injectable 7500 Unit(s) SubCutaneous every 8 hours  insulin glargine Injectable (LANTUS) 18 Unit(s) SubCutaneous at bedtime  insulin lispro (HumaLOG) corrective regimen sliding scale   SubCutaneous Before meals and at bedtime  levothyroxine 100 MICROGram(s) Oral daily  methylPREDNISolone sodium succinate Injectable 10 milliGRAM(s) IV Push daily  pantoprazole    Tablet 40 milliGRAM(s) Oral before breakfast  sildenafil (REVATIO) 20 milliGRAM(s) Oral three times a day  simvastatin 20 milliGRAM(s) Oral at bedtime  traMADol 50 milliGRAM(s) Oral two times a day        LABS:             Complete Blood Count + Automated Diff in AM (02.28.19 @ 06:50)    Nucleated RBC #: 0 K/uL    WBC Count: 11.43 K/uL    RBC Count: 3.22 M/uL    Hemoglobin: 9.2 g/dL    Hematocrit: 31.6 %    Mean Cell Volume: 98.1 fL    Mean Cell Hemoglobin: 28.6 pg    Mean Cell Hemoglobin Conc: 29.1 %    Red Cell Distrib Width: 18.5 %    Platelet Count - Automated: 467 K/uL    MPV: 10.2 fl    Auto Neutrophil #: 6.92 K/uL    Auto Lymphocyte #: 2.54 K/uL    Auto Monocyte #: 1.20 K/uL    Auto Eosinophil #: 0.59 K/uL    Auto Basophil #: 0.07 K/uL    Auto Neutrophil %: 60.5 %    Auto Lymphocyte %: 22.2 %    Auto Monocyte %: 10.5 %    Auto Eosinophil %: 5.2 %    Auto Basophil %: 0.6 %    Auto Immature Granulocyte %: 1.0: (Includes meta, myelo and promyelocytes) %    Comprehensive Metabolic, Mg + Phosphorus (02.28.19 @ 06:50)    eGFR if Non : 87: The units for eGFR are ml/min/1.73m2 (normalized body  surface area). The eGFR is calculated from a serum  creatinine using the CKD-EPI equation. Other variables  required for calculation are race, age and sex. Among  patients with chronic kidney disease (CKD), the eGFR is  useful in determining the stage of disease according to  KDOQI CKD classification. All eGFR results are reported  numerically with the following interpretation.    GFR  (ml/min/1.73 m2)          W/KIDNEY DAMAGE    W/O KIDNEY DMG  ==========================================================  >= 90.......................Stage 1..............Normal  60-89.......................Stage 2...........Decreased GFR  30-59.......................Stage 3..............Stage 3  15-29.......................Stage 4..............Stage 4  < 15........................Stage 5..............Stage 5    Each stage of CKD assumes that the associated GFR level  has been in effect for at least 3 months. Determination of  stages one and two (with eGFR > 59ml/min/m2) requires  estimation of kidney damage for at least 3 months as  defined by structural or functional abnormalities.    Limitations: All estimates of GFR will be less accurate  for patients at extremes of muscle mass (including but  not limited to frail elderly, critically ill, or cancer  patients), those with unusual diets, and those with  conditions associated with reduced secretion or  extrarenal elimination of creatinine. The eGFR equation  is not recommended for use in patients with unstable  creatinine levels. mL/min    Phosphorus Level, Serum: 2.3: Delta: 3.1 on 02/27/  Delta: 3.1 on 02/27/ mg/dL    eGFR if : 101 mL/min    Sodium, Serum: 139: Delta: 147 on 02/27/  Delta: 147 on 02/27/ mmol/L    Potassium, Serum: 3.5 mmol/L    Chloride, Serum: 100 mmol/L    Carbon Dioxide, Serum: 20 mmol/L    Anion Gap, Serum: 19 mmo/L    Blood Urea Nitrogen, Serum: 26 mg/dL    Creatinine, Serum: 0.74 mg/dL    Glucose, Serum: 139 mg/dL    Calcium, Total Serum: 9.3 mg/dL    Protein Total, Serum: 7.3 g/dL    Albumin, Serum: 3.5 g/dL    Bilirubin Total, Serum: 0.5 mg/dL    Alkaline Phosphatase, Serum: 124 u/L    Aspartate Aminotransferase (AST/SGOT): 33 u/L    Alanine Aminotransferase (ALT/SGPT): 30 u/L    Magnesium, Serum: 1.8 mg/dL

## 2019-02-28 NOTE — PROGRESS NOTE ADULT - PROBLEM SELECTOR PLAN 7
- SBO s/p ex-lap with findings of healthy bowel c/b evisceration of bowel requiring placement of retention sutures  - sx note appreciated  - cont dressing to wound

## 2019-02-28 NOTE — PROGRESS NOTE ADULT - PROBLEM SELECTOR PLAN 1
- prolonged intubation, now extubated to high flow/BIPAP  - BIPAP HS and PRN  - high flow, wean as tolerated  - cont sildenafil  - cont steroids  - chest PT

## 2019-02-28 NOTE — PROGRESS NOTE ADULT - PROBLEM SELECTOR PLAN 2
- had 27 days of Vanc, 3 days of Ceftriaxone, and 8 days of Zosyn for enterococcal bacteremia  - cultures cleared  - cont to monitor

## 2019-02-28 NOTE — PROGRESS NOTE ADULT - ATTENDING COMMENTS
Pt tx back from MICU  HFNC day, NIV night  Tm101, wbc stable  cultures sent  just completed abx. will restart if cont to spike or pos cx  overall prognosis is very poor -- palliative, ethics noted

## 2019-02-28 NOTE — PROGRESS NOTE ADULT - PROBLEM SELECTOR PLAN 4
- febrile to 101 overnight  - blood cultures sent  - monitor off abx for now  - if spikes fever again, will restart abx (ertapenem)

## 2019-03-01 ENCOUNTER — OUTPATIENT (OUTPATIENT)
Dept: OUTPATIENT SERVICES | Facility: HOSPITAL | Age: 64
LOS: 1 days | End: 2019-03-01
Payer: MEDICARE

## 2019-03-01 LAB
ANION GAP SERPL CALC-SCNC: 18 MMO/L — HIGH (ref 7–14)
BUN SERPL-MCNC: 19 MG/DL — SIGNIFICANT CHANGE UP (ref 7–23)
CALCIUM SERPL-MCNC: 9.7 MG/DL — SIGNIFICANT CHANGE UP (ref 8.4–10.5)
CHLORIDE SERPL-SCNC: 101 MMOL/L — SIGNIFICANT CHANGE UP (ref 98–107)
CO2 SERPL-SCNC: 20 MMOL/L — LOW (ref 22–31)
CREAT SERPL-MCNC: 0.73 MG/DL — SIGNIFICANT CHANGE UP (ref 0.5–1.3)
GLUCOSE BLDC GLUCOMTR-MCNC: 188 MG/DL — HIGH (ref 70–99)
GLUCOSE BLDC GLUCOMTR-MCNC: 200 MG/DL — HIGH (ref 70–99)
GLUCOSE BLDC GLUCOMTR-MCNC: 239 MG/DL — HIGH (ref 70–99)
GLUCOSE BLDC GLUCOMTR-MCNC: 270 MG/DL — HIGH (ref 70–99)
GLUCOSE SERPL-MCNC: 137 MG/DL — HIGH (ref 70–99)
HCT VFR BLD CALC: 35.4 % — SIGNIFICANT CHANGE UP (ref 34.5–45)
HGB BLD-MCNC: 10.1 G/DL — LOW (ref 11.5–15.5)
MCHC RBC-ENTMCNC: 28.2 PG — SIGNIFICANT CHANGE UP (ref 27–34)
MCHC RBC-ENTMCNC: 28.5 % — LOW (ref 32–36)
MCV RBC AUTO: 98.9 FL — SIGNIFICANT CHANGE UP (ref 80–100)
NRBC # FLD: 0 K/UL — LOW (ref 25–125)
PLATELET # BLD AUTO: 471 K/UL — HIGH (ref 150–400)
PMV BLD: 10.1 FL — SIGNIFICANT CHANGE UP (ref 7–13)
POTASSIUM SERPL-MCNC: 4.1 MMOL/L — SIGNIFICANT CHANGE UP (ref 3.5–5.3)
POTASSIUM SERPL-SCNC: 4.1 MMOL/L — SIGNIFICANT CHANGE UP (ref 3.5–5.3)
RBC # BLD: 3.58 M/UL — LOW (ref 3.8–5.2)
RBC # FLD: 18.4 % — HIGH (ref 10.3–14.5)
SODIUM SERPL-SCNC: 139 MMOL/L — SIGNIFICANT CHANGE UP (ref 135–145)
SPECIMEN SOURCE: SIGNIFICANT CHANGE UP
SPECIMEN SOURCE: SIGNIFICANT CHANGE UP
WBC # BLD: 11.17 K/UL — HIGH (ref 3.8–10.5)
WBC # FLD AUTO: 11.17 K/UL — HIGH (ref 3.8–10.5)

## 2019-03-01 PROCEDURE — 99233 SBSQ HOSP IP/OBS HIGH 50: CPT | Mod: GC

## 2019-03-01 PROCEDURE — G9001: CPT

## 2019-03-01 RX ORDER — OXYCODONE HYDROCHLORIDE 5 MG/1
5 TABLET ORAL EVERY 4 HOURS
Qty: 0 | Refills: 0 | Status: DISCONTINUED | OUTPATIENT
Start: 2019-03-01 | End: 2019-03-07

## 2019-03-01 RX ORDER — GABAPENTIN 400 MG/1
300 CAPSULE ORAL
Qty: 0 | Refills: 0 | Status: DISCONTINUED | OUTPATIENT
Start: 2019-03-01 | End: 2019-03-07

## 2019-03-01 RX ORDER — INSULIN LISPRO 100/ML
VIAL (ML) SUBCUTANEOUS
Qty: 0 | Refills: 0 | Status: DISCONTINUED | OUTPATIENT
Start: 2019-03-01 | End: 2019-03-10

## 2019-03-01 RX ORDER — INSULIN LISPRO 100/ML
4 VIAL (ML) SUBCUTANEOUS
Qty: 0 | Refills: 0 | Status: DISCONTINUED | OUTPATIENT
Start: 2019-03-01 | End: 2019-03-07

## 2019-03-01 RX ADMIN — Medication 137 MICROGRAM(S): at 05:52

## 2019-03-01 RX ADMIN — PANTOPRAZOLE SODIUM 40 MILLIGRAM(S): 20 TABLET, DELAYED RELEASE ORAL at 05:53

## 2019-03-01 RX ADMIN — GABAPENTIN 300 MILLIGRAM(S): 400 CAPSULE ORAL at 21:17

## 2019-03-01 RX ADMIN — TRAMADOL HYDROCHLORIDE 50 MILLIGRAM(S): 50 TABLET ORAL at 18:05

## 2019-03-01 RX ADMIN — Medication 20 MILLIGRAM(S): at 13:49

## 2019-03-01 RX ADMIN — OXYCODONE HYDROCHLORIDE 5 MILLIGRAM(S): 5 TABLET ORAL at 17:04

## 2019-03-01 RX ADMIN — Medication 10 MILLIGRAM(S): at 05:52

## 2019-03-01 RX ADMIN — HEPARIN SODIUM 7500 UNIT(S): 5000 INJECTION INTRAVENOUS; SUBCUTANEOUS at 13:49

## 2019-03-01 RX ADMIN — SIMVASTATIN 20 MILLIGRAM(S): 20 TABLET, FILM COATED ORAL at 21:17

## 2019-03-01 RX ADMIN — TRAMADOL HYDROCHLORIDE 50 MILLIGRAM(S): 50 TABLET ORAL at 06:21

## 2019-03-01 RX ADMIN — Medication 6: at 12:23

## 2019-03-01 RX ADMIN — FENTANYL CITRATE 1 PATCH: 50 INJECTION INTRAVENOUS at 07:30

## 2019-03-01 RX ADMIN — Medication 1 APPLICATION(S): at 11:33

## 2019-03-01 RX ADMIN — Medication 4 UNIT(S): at 17:56

## 2019-03-01 RX ADMIN — OXYCODONE HYDROCHLORIDE 5 MILLIGRAM(S): 5 TABLET ORAL at 10:16

## 2019-03-01 RX ADMIN — OXYCODONE HYDROCHLORIDE 5 MILLIGRAM(S): 5 TABLET ORAL at 16:06

## 2019-03-01 RX ADMIN — Medication 20 MILLIGRAM(S): at 05:51

## 2019-03-01 RX ADMIN — GABAPENTIN 300 MILLIGRAM(S): 400 CAPSULE ORAL at 09:25

## 2019-03-01 RX ADMIN — Medication 20 MILLIGRAM(S): at 21:18

## 2019-03-01 RX ADMIN — OXYCODONE HYDROCHLORIDE 5 MILLIGRAM(S): 5 TABLET ORAL at 09:16

## 2019-03-01 RX ADMIN — HEPARIN SODIUM 7500 UNIT(S): 5000 INJECTION INTRAVENOUS; SUBCUTANEOUS at 05:52

## 2019-03-01 RX ADMIN — Medication 2: at 17:55

## 2019-03-01 RX ADMIN — Medication 2 UNIT(S): at 08:18

## 2019-03-01 RX ADMIN — INSULIN GLARGINE 20 UNIT(S): 100 INJECTION, SOLUTION SUBCUTANEOUS at 23:05

## 2019-03-01 RX ADMIN — Medication 2 UNIT(S): at 12:23

## 2019-03-01 RX ADMIN — TRAMADOL HYDROCHLORIDE 50 MILLIGRAM(S): 50 TABLET ORAL at 17:14

## 2019-03-01 RX ADMIN — Medication 2: at 08:17

## 2019-03-01 RX ADMIN — TRAMADOL HYDROCHLORIDE 50 MILLIGRAM(S): 50 TABLET ORAL at 05:51

## 2019-03-01 RX ADMIN — FENTANYL CITRATE 1 PATCH: 50 INJECTION INTRAVENOUS at 20:05

## 2019-03-01 RX ADMIN — HEPARIN SODIUM 7500 UNIT(S): 5000 INJECTION INTRAVENOUS; SUBCUTANEOUS at 21:17

## 2019-03-01 NOTE — PROGRESS NOTE ADULT - PROBLEM SELECTOR PLAN 4
- afebrile now  - blood cultures negative to date  - monitor off abx for now  - if spikes fever again, will restart abx (ertapenem)

## 2019-03-01 NOTE — PROGRESS NOTE ADULT - SUBJECTIVE AND OBJECTIVE BOX
CHIEF COMPLAINT: Patient is a 63y old  Female who presents with a chief complaint of small bowel resection with complications (2019 09:00)    Interval Events:      REVIEW OF SYSTEMS:  Constitutional:   Eyes:  ENT:  CV:  Resp:  GI:  :  MSK:  Integumentary:  Neurological:  Psychiatric:  Endocrine:  Hematologic/Lymphatic:  Allergic/Immunologic:  [ ] All other systems negative  [ ] Unable to assess ROS because ________      OBJECTIVE:  ICU Vital Signs Last 24 Hrs  T(C): 37.4 (01 Mar 2019 05:40), Max: 37.4 (2019 11:50)  T(F): 99.4 (01 Mar 2019 05:40), Max: 99.4 (01 Mar 2019 05:40)  HR: 117 (01 Mar 2019 05:40) (102 - 121)  BP: 140/80 (01 Mar 2019 05:40) (125/76 - 140/80)  BP(mean): --  ABP: --  ABP(mean): --  RR: 20 (01 Mar 2019 05:40) (20 - 24)  SpO2: 93% (01 Mar 2019 05:40) (93% - 99%)     @ 07:01  -  03-01 @ 07:00  --------------------------------------------------------  IN: 200 mL / OUT: 215 mL / NET: -15 mL    HOSPITAL MEDICATIONS:  MEDICATIONS  (STANDING):  collagenase Ointment 1 Application(s) Topical daily  dextrose 5%. 1000 milliLiter(s) (50 mL/Hr) IV Continuous <Continuous>  dextrose 50% Injectable 12.5 Gram(s) IV Push once  dextrose 50% Injectable 25 Gram(s) IV Push once  dextrose 50% Injectable 25 Gram(s) IV Push once  fentaNYL   Patch  50 MICROgram(s)/Hr 1 Patch Transdermal every 72 hours  heparin  Injectable 7500 Unit(s) SubCutaneous every 8 hours  insulin glargine Injectable (LANTUS) 20 Unit(s) SubCutaneous at bedtime  insulin lispro (HumaLOG) corrective regimen sliding scale   SubCutaneous Before meals and at bedtime  insulin lispro Injectable (HumaLOG) 2 Unit(s) SubCutaneous three times a day before meals  levothyroxine 137 MICROGram(s) Oral <User Schedule>  methylPREDNISolone sodium succinate Injectable 10 milliGRAM(s) IV Push daily  pantoprazole    Tablet 40 milliGRAM(s) Oral before breakfast  sildenafil (REVATIO) 20 milliGRAM(s) Oral three times a day  simvastatin 20 milliGRAM(s) Oral at bedtime  traMADol 50 milliGRAM(s) Oral two times a day    MEDICATIONS  (PRN):  dextrose 40% Gel 15 Gram(s) Oral once PRN Blood Glucose LESS THAN 70 milliGRAM(s)/deciLiter  glucagon  Injectable 1 milliGRAM(s) IntraMuscular once PRN Glucose <70 milliGRAM(s)/deciLiter      LABS:                        10.1   11.17 )-----------( 471      ( 01 Mar 2019 06:15 )             35.4         139  |  100  |  26<H>  ----------------------------<  139<H>  3.5   |  20<L>  |  0.74    Ca    9.3      2019 06:50  Phos  2.3       Mg     1.8         TPro  7.3  /  Alb  3.5  /  TBili  0.5  /  DBili  x   /  AST  33<H>  /  ALT  30  /  AlkPhos  124<H>        Urinalysis Basic - ( 2019 06:55 )    Color: YELLOW / Appearance: CLEAR / S.023 / pH: 6.0  Gluc: NEGATIVE / Ketone: SMALL  / Bili: NEGATIVE / Urobili: TRACE   Blood: NEGATIVE / Protein: 30 / Nitrite: NEGATIVE   Leuk Esterase: LARGE / RBC: 6-10 / WBC >50   Sq Epi: FEW / Non Sq Epi: x / Bacteria: NEGATIVE            MICROBIOLOGY:     RADIOLOGY:  [ ] Reviewed and interpreted by me    PULMONARY FUNCTION TESTS:    EKG: CHIEF COMPLAINT: Patient is a 63y old  Female who presents with a chief complaint of small bowel resection with complications (26 Feb 2019 09:00)    Interval Events: none overnight      REVIEW OF SYSTEMS:  Constitutional: c/o generalized pain  CV: denies  Resp: denies  GI: denies  [x] All other systems negative  [ ] Unable to assess ROS because ________      OBJECTIVE:  ICU Vital Signs Last 24 Hrs  T(C): 37.4 (01 Mar 2019 05:40), Max: 37.4 (28 Feb 2019 11:50)  T(F): 99.4 (01 Mar 2019 05:40), Max: 99.4 (01 Mar 2019 05:40)  HR: 117 (01 Mar 2019 05:40) (102 - 121)  BP: 140/80 (01 Mar 2019 05:40) (125/76 - 140/80)  BP(mean): --  ABP: --  ABP(mean): --  RR: 20 (01 Mar 2019 05:40) (20 - 24)  SpO2: 93% (01 Mar 2019 05:40) (93% - 99%)    02-28 @ 07:01  -  03-01 @ 07:00  --------------------------------------------------------  IN: 200 mL / OUT: 215 mL / NET: -15 mL    HOSPITAL MEDICATIONS:  MEDICATIONS  (STANDING):  collagenase Ointment 1 Application(s) Topical daily  dextrose 5%. 1000 milliLiter(s) (50 mL/Hr) IV Continuous <Continuous>  dextrose 50% Injectable 12.5 Gram(s) IV Push once  dextrose 50% Injectable 25 Gram(s) IV Push once  dextrose 50% Injectable 25 Gram(s) IV Push once  fentaNYL   Patch  50 MICROgram(s)/Hr 1 Patch Transdermal every 72 hours  heparin  Injectable 7500 Unit(s) SubCutaneous every 8 hours  insulin glargine Injectable (LANTUS) 20 Unit(s) SubCutaneous at bedtime  insulin lispro (HumaLOG) corrective regimen sliding scale   SubCutaneous Before meals and at bedtime  insulin lispro Injectable (HumaLOG) 2 Unit(s) SubCutaneous three times a day before meals  levothyroxine 137 MICROGram(s) Oral <User Schedule>  methylPREDNISolone sodium succinate Injectable 10 milliGRAM(s) IV Push daily  pantoprazole    Tablet 40 milliGRAM(s) Oral before breakfast  sildenafil (REVATIO) 20 milliGRAM(s) Oral three times a day  simvastatin 20 milliGRAM(s) Oral at bedtime  traMADol 50 milliGRAM(s) Oral two times a day    MEDICATIONS  (PRN):  dextrose 40% Gel 15 Gram(s) Oral once PRN Blood Glucose LESS THAN 70 milliGRAM(s)/deciLiter  glucagon  Injectable 1 milliGRAM(s) IntraMuscular once PRN Glucose <70 milliGRAM(s)/deciLiter      LABS:                        10.1   11.17 )-----------( 471      ( 01 Mar 2019 06:15 )             35.4     Basic Metabolic Panel in AM (03.01.19 @ 06:15)    Sodium, Serum: 139 mmol/L    Potassium, Serum: 4.1 mmol/L    Chloride, Serum: 101 mmol/L    Carbon Dioxide, Serum: 20 mmol/L    Anion Gap, Serum: 18 mmo/L    Blood Urea Nitrogen, Serum: 19 mg/dL    Creatinine, Serum: 0.73 mg/dL    Glucose, Serum: 137 mg/dL    Calcium, Total Serum: 9.7 mg/dL    eGFR if Non : 89: The units for eGFR are ml/min/1.73m2 (normalized body  surface area). The eGFR is calculated from a serum  creatinine using the CKD-EPI equation. Other variables  required for calculation are race, age and sex. Among  patients with chronic kidney disease (CKD), the eGFR is  useful in determining the stage of disease according to  KDOQI CKD classification. All eGFR results are reported  numerically with the following interpretation.    GFR  (ml/min/1.73 m2)          W/KIDNEY DAMAGE    W/O KIDNEY DMG  ==========================================================  >= 90.......................Stage 1..............Normal  60-89.......................Stage 2...........Decreased GFR  30-59.......................Stage 3..............Stage 3  15-29.......................Stage 4..............Stage 4  < 15........................Stage 5..............Stage 5    Each stage of CKD assumes that the associated GFR level  has been in effect for at least 3 months. Determination of  stages one and two (with eGFR > 59ml/min/m2) requires  estimation of kidney damage for at least 3 months as  defined by structural or functional abnormalities.    Limitations: All estimates of GFR will be less accurate  for patients at extremes of muscle mass (including but  not limited to frail elderly, critically ill, or cancer  patients), those with unusual diets, and those with  conditions associated with reduced secretion or  extrarenal elimination of creatinine. The eGFR equation  is not recommended for use in patients with unstable  creatinine levels. mL/min    eGFR if : 103 mL/min

## 2019-03-01 NOTE — PROGRESS NOTE ADULT - PROBLEM SELECTOR PLAN 1
BG elevated above goal of 140-180 mg/dl.  pt continues on methylpred 10mg IV for RA (chronic steroid for RA >30 years)  c/w lantus to 20 units s qhs (BMP glucose 137 from 111 last night will not increase)  increase humalog 4 units sq tid ac  and low dose humalog correctional scale premeals   will trend and f/u.     inform endocrine of hypoglycemia or persistent hyperglycemia episodes as changes in pts insulin regimen will need to be made.   notify endocrine if any plans to be NPO/diet changes as this will also affect insulin regimen.

## 2019-03-01 NOTE — PROGRESS NOTE ADULT - PROBLEM SELECTOR PLAN 2
on LevoThryoxine 137 mcg PO (was receiving IV prior)  please check TSH and Free T4 given that it was  elevated to 30 in dec 2018

## 2019-03-01 NOTE — PROGRESS NOTE ADULT - ASSESSMENT
63 year old female with PMHx of HTN, DM, hypothyroidism, RA (prednisone 10mg), pulmonary HTN, CHRIS, and COPD (3L at home) who was originally admitted on 12/15 for SBO s/p ex lap with lysis of adhesions (4 retention sutures) c/b 12/30 she eviscerated 2/2 a coughing episode s/p ex lap (6 retention sutures) c/b multifocal pneumonia, developed hypercapnic respiratory failure requiring intubation 1/6, s/p bronch 1/9/18, and extubated on 1/24, course further c/b Enterococci bacteremia now readmitted to MICU for hypoxic respiratory failure 2/2 atelectasis requiring intubation 01/31. Had grown ESBL in urine, s/p ertapenem.  Extubated to high flow/ BIPAP.

## 2019-03-01 NOTE — PROGRESS NOTE ADULT - SUBJECTIVE AND OBJECTIVE BOX
Chief Complaint/Follow-up on: DM    Subjective:  sleeping  on high flow oxygen   d/w son at bedside, he states pt is doing better than prior   FSBG elevated, insulin use reviewed     MEDICATIONS  (STANDING):  collagenase Ointment 1 Application(s) Topical daily  dextrose 5%. 1000 milliLiter(s) (50 mL/Hr) IV Continuous <Continuous>  dextrose 50% Injectable 12.5 Gram(s) IV Push once  dextrose 50% Injectable 25 Gram(s) IV Push once  dextrose 50% Injectable 25 Gram(s) IV Push once  fentaNYL   Patch  50 MICROgram(s)/Hr 1 Patch Transdermal every 72 hours  gabapentin 300 milliGRAM(s) Oral two times a day  heparin  Injectable 7500 Unit(s) SubCutaneous every 8 hours  insulin glargine Injectable (LANTUS) 20 Unit(s) SubCutaneous at bedtime  insulin lispro (HumaLOG) corrective regimen sliding scale   SubCutaneous Before meals and at bedtime  insulin lispro Injectable (HumaLOG) 2 Unit(s) SubCutaneous three times a day before meals  levothyroxine 137 MICROGram(s) Oral <User Schedule>  methylPREDNISolone sodium succinate Injectable 10 milliGRAM(s) IV Push daily  pantoprazole    Tablet 40 milliGRAM(s) Oral before breakfast  sildenafil (REVATIO) 20 milliGRAM(s) Oral three times a day  simvastatin 20 milliGRAM(s) Oral at bedtime  traMADol 50 milliGRAM(s) Oral two times a day    MEDICATIONS  (PRN):  dextrose 40% Gel 15 Gram(s) Oral once PRN Blood Glucose LESS THAN 70 milliGRAM(s)/deciLiter  glucagon  Injectable 1 milliGRAM(s) IntraMuscular once PRN Glucose <70 milliGRAM(s)/deciLiter  oxyCODONE    IR 5 milliGRAM(s) Oral every 4 hours PRN moderate and severe pain      PHYSICAL EXAM:  VITALS: T(C): 37.2 (03-01-19 @ 08:00)  T(F): 99 (03-01-19 @ 08:00), Max: 99.4 (03-01-19 @ 05:40)  HR: 112 (03-01-19 @ 13:44) (111 - 121)  BP: 134/76 (03-01-19 @ 08:00) (127/76 - 140/80)  RR:  (20 - 22)  SpO2:  (93% - 100%)  Wt(kg): --  GENERAL: NAD, well-groomed, well-developed  EYES: No proptosis, no injection  HEENT:  Atraumatic, Normocephalic, moist mucous membranes  RESPIRATORY: Clear to auscultation bilaterally; No rales, rhonchi, wheezing, or rubs  CARDIOVASCULAR: Regular rate and rhythm; No murmurs; no peripheral edema  GI: Soft, nontender, non distended, normal bowel sounds      POCT Blood Glucose.: 270 mg/dL (03-01-19 @ 12:11)  POCT Blood Glucose.: 200 mg/dL (03-01-19 @ 08:09)  POCT Blood Glucose.: 111 mg/dL (02-28-19 @ 21:57)  POCT Blood Glucose.: 197 mg/dL (02-28-19 @ 17:56)  POCT Blood Glucose.: 233 mg/dL (02-28-19 @ 11:13)  POCT Blood Glucose.: 203 mg/dL (02-28-19 @ 08:33)  POCT Blood Glucose.: 192 mg/dL (02-28-19 @ 08:07)  POCT Blood Glucose.: 112 mg/dL (02-27-19 @ 21:55)  POCT Blood Glucose.: 113 mg/dL (02-27-19 @ 18:02)  POCT Blood Glucose.: 258 mg/dL (02-27-19 @ 12:12)  POCT Blood Glucose.: 103 mg/dL (02-27-19 @ 05:05)  POCT Blood Glucose.: 96 mg/dL (02-26-19 @ 23:27)  POCT Blood Glucose.: 109 mg/dL (02-26-19 @ 17:54)    03-01    139  |  101  |  19  ----------------------------<  137<H>  4.1   |  20<L>  |  0.73    EGFR if : 103  EGFR if non : 89    Ca    9.7      03-01  Mg     1.8     02-28  Phos  2.3     02-28    TPro  7.3  /  Alb  3.5  /  TBili  0.5  /  DBili  x   /  AST  33<H>  /  ALT  30  /  AlkPhos  124<H>  02-28          Thyroid Function Tests:      Hemoglobin A1C, Whole Blood: 7.1 % <H> [4.0 - 5.6] (12-17-18 @ 03:50)  Hemoglobin A1C, Whole Blood: 7.3 % <H> [4.0 - 5.6] (12-16-18 @ 04:17) Chief Complaint/Follow-up on: DM    Subjective:  sleeping  on high flow oxygen   d/w son at bedside, he states pt is doing better than prior   FSBG elevated, insulin use reviewed     MEDICATIONS  (STANDING):  collagenase Ointment 1 Application(s) Topical daily  dextrose 5%. 1000 milliLiter(s) (50 mL/Hr) IV Continuous <Continuous>  dextrose 50% Injectable 12.5 Gram(s) IV Push once  dextrose 50% Injectable 25 Gram(s) IV Push once  dextrose 50% Injectable 25 Gram(s) IV Push once  fentaNYL   Patch  50 MICROgram(s)/Hr 1 Patch Transdermal every 72 hours  gabapentin 300 milliGRAM(s) Oral two times a day  heparin  Injectable 7500 Unit(s) SubCutaneous every 8 hours  insulin glargine Injectable (LANTUS) 20 Unit(s) SubCutaneous at bedtime  insulin lispro (HumaLOG) corrective regimen sliding scale   SubCutaneous Before meals and at bedtime  insulin lispro Injectable (HumaLOG) 2 Unit(s) SubCutaneous three times a day before meals  levothyroxine 137 MICROGram(s) Oral <User Schedule>  methylPREDNISolone sodium succinate Injectable 10 milliGRAM(s) IV Push daily  pantoprazole    Tablet 40 milliGRAM(s) Oral before breakfast  sildenafil (REVATIO) 20 milliGRAM(s) Oral three times a day  simvastatin 20 milliGRAM(s) Oral at bedtime  traMADol 50 milliGRAM(s) Oral two times a day    MEDICATIONS  (PRN):  dextrose 40% Gel 15 Gram(s) Oral once PRN Blood Glucose LESS THAN 70 milliGRAM(s)/deciLiter  glucagon  Injectable 1 milliGRAM(s) IntraMuscular once PRN Glucose <70 milliGRAM(s)/deciLiter  oxyCODONE    IR 5 milliGRAM(s) Oral every 4 hours PRN moderate and severe pain      PHYSICAL EXAM:  VITALS: T(C): 37.2 (03-01-19 @ 08:00)  T(F): 99 (03-01-19 @ 08:00), Max: 99.4 (03-01-19 @ 05:40)  HR: 112 (03-01-19 @ 13:44) (111 - 121)  BP: 134/76 (03-01-19 @ 08:00) (127/76 - 140/80)  RR:  (20 - 22)  SpO2:  (93% - 100%)  Wt(kg): --  GENERAL: sleeping on High flow oxygen, obese   HEENT:  Atraumatic, Normocephalic, moist mucous membranes  RESPIRATORY:connected to high flow oxygen, CTAB   CARDIOVASCULAR: Regular rate and rhythm;   GI: Soft, nontender, non distended, normal bowel sounds      POCT Blood Glucose.: 270 mg/dL (03-01-19 @ 12:11)  POCT Blood Glucose.: 200 mg/dL (03-01-19 @ 08:09)  POCT Blood Glucose.: 111 mg/dL (02-28-19 @ 21:57)  POCT Blood Glucose.: 197 mg/dL (02-28-19 @ 17:56)  POCT Blood Glucose.: 233 mg/dL (02-28-19 @ 11:13)  POCT Blood Glucose.: 203 mg/dL (02-28-19 @ 08:33)  POCT Blood Glucose.: 192 mg/dL (02-28-19 @ 08:07)  POCT Blood Glucose.: 112 mg/dL (02-27-19 @ 21:55)  POCT Blood Glucose.: 113 mg/dL (02-27-19 @ 18:02)  POCT Blood Glucose.: 258 mg/dL (02-27-19 @ 12:12)  POCT Blood Glucose.: 103 mg/dL (02-27-19 @ 05:05)  POCT Blood Glucose.: 96 mg/dL (02-26-19 @ 23:27)  POCT Blood Glucose.: 109 mg/dL (02-26-19 @ 17:54)    03-01    139  |  101  |  19  ----------------------------<  137<H>  4.1   |  20<L>  |  0.73    EGFR if : 103  EGFR if non : 89    Ca    9.7      03-01  Mg     1.8     02-28  Phos  2.3     02-28    TPro  7.3  /  Alb  3.5  /  TBili  0.5  /  DBili  x   /  AST  33<H>  /  ALT  30  /  AlkPhos  124<H>  02-28          Thyroid Function Tests:      Hemoglobin A1C, Whole Blood: 7.1 % <H> [4.0 - 5.6] (12-17-18 @ 03:50)  Hemoglobin A1C, Whole Blood: 7.3 % <H> [4.0 - 5.6] (12-16-18 @ 04:17)

## 2019-03-01 NOTE — PROGRESS NOTE ADULT - PROBLEM SELECTOR PLAN 3
ON IV solumedrol, now 10 mg daily    please note if HD unstable, will need stress dose steroids (hydrocortisone 50mg q8) for concern for AI in the setting of long term steroid use

## 2019-03-02 LAB
GLUCOSE BLDC GLUCOMTR-MCNC: 116 MG/DL — HIGH (ref 70–99)
GLUCOSE BLDC GLUCOMTR-MCNC: 209 MG/DL — HIGH (ref 70–99)
GLUCOSE BLDC GLUCOMTR-MCNC: 236 MG/DL — HIGH (ref 70–99)
GLUCOSE BLDC GLUCOMTR-MCNC: 247 MG/DL — HIGH (ref 70–99)

## 2019-03-02 PROCEDURE — 99233 SBSQ HOSP IP/OBS HIGH 50: CPT | Mod: GC

## 2019-03-02 RX ORDER — ACETAMINOPHEN 500 MG
650 TABLET ORAL EVERY 6 HOURS
Qty: 0 | Refills: 0 | Status: DISCONTINUED | OUTPATIENT
Start: 2019-03-02 | End: 2019-03-03

## 2019-03-02 RX ADMIN — TRAMADOL HYDROCHLORIDE 50 MILLIGRAM(S): 50 TABLET ORAL at 17:51

## 2019-03-02 RX ADMIN — Medication 2: at 17:20

## 2019-03-02 RX ADMIN — PANTOPRAZOLE SODIUM 40 MILLIGRAM(S): 20 TABLET, DELAYED RELEASE ORAL at 05:12

## 2019-03-02 RX ADMIN — Medication 4 UNIT(S): at 17:20

## 2019-03-02 RX ADMIN — INSULIN GLARGINE 20 UNIT(S): 100 INJECTION, SOLUTION SUBCUTANEOUS at 22:46

## 2019-03-02 RX ADMIN — HEPARIN SODIUM 7500 UNIT(S): 5000 INJECTION INTRAVENOUS; SUBCUTANEOUS at 05:10

## 2019-03-02 RX ADMIN — Medication 650 MILLIGRAM(S): at 08:00

## 2019-03-02 RX ADMIN — OXYCODONE HYDROCHLORIDE 5 MILLIGRAM(S): 5 TABLET ORAL at 22:46

## 2019-03-02 RX ADMIN — Medication 4 UNIT(S): at 12:34

## 2019-03-02 RX ADMIN — Medication 20 MILLIGRAM(S): at 05:11

## 2019-03-02 RX ADMIN — Medication 20 MILLIGRAM(S): at 17:21

## 2019-03-02 RX ADMIN — OXYCODONE HYDROCHLORIDE 5 MILLIGRAM(S): 5 TABLET ORAL at 12:35

## 2019-03-02 RX ADMIN — Medication 4 UNIT(S): at 08:34

## 2019-03-02 RX ADMIN — Medication 20 MILLIGRAM(S): at 22:46

## 2019-03-02 RX ADMIN — Medication 1 APPLICATION(S): at 12:35

## 2019-03-02 RX ADMIN — FENTANYL CITRATE 1 PATCH: 50 INJECTION INTRAVENOUS at 20:00

## 2019-03-02 RX ADMIN — HEPARIN SODIUM 7500 UNIT(S): 5000 INJECTION INTRAVENOUS; SUBCUTANEOUS at 17:21

## 2019-03-02 RX ADMIN — TRAMADOL HYDROCHLORIDE 50 MILLIGRAM(S): 50 TABLET ORAL at 17:21

## 2019-03-02 RX ADMIN — OXYCODONE HYDROCHLORIDE 5 MILLIGRAM(S): 5 TABLET ORAL at 08:35

## 2019-03-02 RX ADMIN — OXYCODONE HYDROCHLORIDE 5 MILLIGRAM(S): 5 TABLET ORAL at 09:05

## 2019-03-02 RX ADMIN — Medication 2: at 12:34

## 2019-03-02 RX ADMIN — GABAPENTIN 300 MILLIGRAM(S): 400 CAPSULE ORAL at 22:46

## 2019-03-02 RX ADMIN — Medication 650 MILLIGRAM(S): at 05:58

## 2019-03-02 RX ADMIN — OXYCODONE HYDROCHLORIDE 5 MILLIGRAM(S): 5 TABLET ORAL at 23:16

## 2019-03-02 RX ADMIN — GABAPENTIN 300 MILLIGRAM(S): 400 CAPSULE ORAL at 09:58

## 2019-03-02 RX ADMIN — OXYCODONE HYDROCHLORIDE 5 MILLIGRAM(S): 5 TABLET ORAL at 13:05

## 2019-03-02 RX ADMIN — Medication 2: at 08:34

## 2019-03-02 RX ADMIN — Medication 10 MILLIGRAM(S): at 05:10

## 2019-03-02 RX ADMIN — TRAMADOL HYDROCHLORIDE 50 MILLIGRAM(S): 50 TABLET ORAL at 05:11

## 2019-03-02 RX ADMIN — Medication 137 MICROGRAM(S): at 05:11

## 2019-03-02 RX ADMIN — SIMVASTATIN 20 MILLIGRAM(S): 20 TABLET, FILM COATED ORAL at 22:46

## 2019-03-02 RX ADMIN — TRAMADOL HYDROCHLORIDE 50 MILLIGRAM(S): 50 TABLET ORAL at 05:41

## 2019-03-02 RX ADMIN — FENTANYL CITRATE 1 PATCH: 50 INJECTION INTRAVENOUS at 08:00

## 2019-03-02 NOTE — PHYSICAL THERAPY INITIAL EVALUATION ADULT - MANUAL MUSCLE TESTING RESULTS, REHAB EVAL
bilateral UE and LE grossly  2-/5 except left LE 1+/5
bilateral LE grossly 3-/5. UE assessed by OT
bilateral UE grossly 3/5 except bilateral shoulder 3-/5; bilateral LE grossly 3/5

## 2019-03-02 NOTE — PHYSICAL THERAPY INITIAL EVALUATION ADULT - PRECAUTIONS/LIMITATIONS, REHAB EVAL
Hi Flow via NC; contact isolation ESBL +urine/fall precautions/isolation precautions/oxygen therapy device and L/min
oxygen therapy device and L/min
fall precautions

## 2019-03-02 NOTE — PHYSICAL THERAPY INITIAL EVALUATION ADULT - DIAGNOSIS, PT EVAL
generalized weakness due to increased length hospital stay
Impairments in balance and strength
Impairment in strength

## 2019-03-02 NOTE — PROGRESS NOTE ADULT - ATTENDING COMMENTS
No change in status. awake and alert  HFNC day, alternating with nocturnal bipap  pain management  Will attempt getting out of bed today  prognosis poor.  Plan for another meeting with pt, son, palliative care and ethics on Monday.     I was physically present for the key portions of the evaluation and management (E/M) service provided.  I agree with the above history, physical, and plan which I have reviewed and edited where appropriate.

## 2019-03-02 NOTE — PHYSICAL THERAPY INITIAL EVALUATION ADULT - LEVEL OF INDEPENDENCE: SIT/STAND, REHAB EVAL
To be assessed.
unable to perform
To be assessed. Deferred at this time secondary to endotracheal tube.

## 2019-03-02 NOTE — PROGRESS NOTE ADULT - PROBLEM SELECTOR PLAN 4
- afebrile now  - blood cultures negative to date  - monitor off abx for now  - if spikes fever again, will restart abx (ertapenem) - temp 100.6 overnight, repeat blood cultures sent  - blood cultures negative to date  - Trend fever curve, May resume abx if spikes again (ertapenem)

## 2019-03-02 NOTE — PHYSICAL THERAPY INITIAL EVALUATION ADULT - CRITERIA FOR SKILLED THERAPEUTIC INTERVENTIONS
anticipated discharge recommendation/risk reduction/prevention/predicted duration of therapy intervention/impairments found/rehab potential/therapy frequency
bed mobility, transfers and ambulation/functional limitations in following categories
rehab potential/risk reduction/prevention/therapy frequency/predicted duration of therapy intervention/impairments found/anticipated discharge recommendation

## 2019-03-02 NOTE — PHYSICAL THERAPY INITIAL EVALUATION ADULT - PHYSICAL ASSIST/NONPHYSICAL ASSIST: SUPINE/SIT, REHAB EVAL
2 person assist/patient unable to attain sitting position over edge of bed despite maximum words of encouragement and manual assist/nonverbal cues (demo/gestures)/verbal cues
nonverbal cues (demo/gestures)/verbal cues/2 person assist

## 2019-03-02 NOTE — PHYSICAL THERAPY INITIAL EVALUATION ADULT - FOLLOWS COMMANDS/ANSWERS QUESTIONS, REHAB EVAL
able to follow single-step instructions
pt. unable to speak secondary to endotracheal tube/able to follow multistep instructions/100% of the time
able to follow multistep instructions/100% of the time

## 2019-03-02 NOTE — PHYSICAL THERAPY INITIAL EVALUATION ADULT - IMPAIRMENTS FOUND, PT EVAL
muscle strength
gait, locomotion, and balance/muscle strength/aerobic capacity/endurance
muscle strength/gait, locomotion, and balance

## 2019-03-02 NOTE — PROGRESS NOTE ADULT - RS GEN PE MLT RESP DETAILS PC
clear to auscultation bilaterally/respirations non-labored/High flow NC in use, nocturnal BIPAP/breath sounds equal

## 2019-03-02 NOTE — PHYSICAL THERAPY INITIAL EVALUATION ADULT - PERTINENT HX OF CURRENT PROBLEM, REHAB EVAL
Pt. admitted for SBO. Per documentation, pt. s/p ex-lap with findings of healthy bowel c/b evisceration of bowel s/p ex-lap with placement of retention sutures. Pt. requiring SICU level care including intubation for worsened respiratory status. Patient was being seen by PT and then discharged from PT due to being intubated and sedated. Pt is now medically appropriate for PT, new PT orders have been placed.
Pt. admitted for SBO. Per documentation, pt. s/p ex-lap with findings of healthy bowel c/b evisceration of bowel s/p ex-lap with placement of retention sutures. Pt. requiring SICU level care including intubation for worsened respiratory status. PMH of  COPD, DM, RA, hypothyroid, pulm HTN, CHRIS, HTN.
Pt. admitted for abdominal pain. S/p exploratory laparotomy with lysis of adhesions on 12/16/18. PMH of COPD on home O2, moderate pulmonary HTN, DM, RA on prednisone, HTN, s/p CVA (no residual deficit).

## 2019-03-02 NOTE — PHYSICAL THERAPY INITIAL EVALUATION ADULT - PLANNED THERAPY INTERVENTIONS, PT EVAL
transfer training/balance training/strengthening/bed mobility training/gait training/Patient left semi reclined in bed in NAD, call bell in reach, all lines intact.
strengthening/transfer training/gait training/balance training/bed mobility training
transfer training/balance training/gait training/strengthening/bed mobility training

## 2019-03-02 NOTE — PHYSICAL THERAPY INITIAL EVALUATION ADULT - ADDITIONAL COMMENTS
1st floor apartment with no steps.  Pt owns rolling walker and scooter. Pt has HHA about 10 hours/day for 7 days/week.
Pt. owns DME of rolling walker and scooter. Pt. ambulates short distances within her apartment with use of rolling walker and assistance. Pt. has HHA about 10 hours/day for 7 days/week.     Pt. was left supine in bed post PT Re-Evaluation, NAD, all lines/devices intact. ESVIN Chowdhury present and aware of pt. status/participation in PT.
Pt. reports owning DME of rolling walker and scooter. Pt. reports she ambulates short distances within her apartment with use of rolling walker and assistance. Pt. has HHA about 10 hours/day for 7 days/week.     Pt. was left supine in bed post PT evaluation, NAD, all lines intact, call puri within reach. RN Jerald made aware of pt. status.

## 2019-03-02 NOTE — PHYSICAL THERAPY INITIAL EVALUATION ADULT - LEVEL OF INDEPENDENCE: SUPINE/SIT, REHAB EVAL
To be assessed. Pt. deferred at this time secondary to feeling nauseous. RN made aware.
maximum assist (25% patients effort)
dependent (less than 25% patients effort)

## 2019-03-02 NOTE — PHYSICAL THERAPY INITIAL EVALUATION ADULT - LIVES WITH, PROFILE
alone
Lives in a 1st floor apartment alone. No steps to negotiate.
Lives in a 1st floor apartment alone. No steps to negotiate.

## 2019-03-02 NOTE — PHYSICAL THERAPY INITIAL EVALUATION ADULT - DISCHARGE DISPOSITION, PT EVAL
Restorative Rehab to improve functional mobility and strength and to return to baseline functional status.
Discharge recommendation to be determined pending functional mobility assessment.
Inpatient restorative rehabilitation.

## 2019-03-02 NOTE — PROGRESS NOTE ADULT - SUBJECTIVE AND OBJECTIVE BOX
CHIEF COMPLAINT:    Interval Events:    REVIEW OF SYSTEMS:  Constitutional:   Eyes:  ENT:  CV:  Resp:  GI:  :  MSK:  Integumentary:  Neurological:  Psychiatric:  Endocrine:  Hematologic/Lymphatic:  Allergic/Immunologic:  [ ] All other systems negative  [ ] Unable to assess ROS because ________    OBJECTIVE:  ICU Vital Signs Last 24 Hrs  T(C): 38.1 (02 Mar 2019 05:21), Max: 38.1 (02 Mar 2019 05:21)  T(F): 100.6 (02 Mar 2019 05:21), Max: 100.6 (02 Mar 2019 05:21)  HR: 115 (02 Mar 2019 06:44) (109 - 122)  BP: 125/73 (02 Mar 2019 05:08) (125/73 - 147/93)  BP(mean): --  ABP: --  ABP(mean): --  RR: 20 (02 Mar 2019 05:08) (18 - 22)  SpO2: 97% (02 Mar 2019 06:44) (92% - 100%)        03-01 @ 07:01  -  03-02 @ 07:00  --------------------------------------------------------  IN: 118 mL / OUT: 25 mL / NET: 93 mL      CAPILLARY BLOOD GLUCOSE      POCT Blood Glucose.: 188 mg/dL (01 Mar 2019 22:44)        HOSPITAL MEDICATIONS:  MEDICATIONS  (STANDING):  collagenase Ointment 1 Application(s) Topical daily  dextrose 5%. 1000 milliLiter(s) (50 mL/Hr) IV Continuous <Continuous>  dextrose 50% Injectable 12.5 Gram(s) IV Push once  dextrose 50% Injectable 25 Gram(s) IV Push once  dextrose 50% Injectable 25 Gram(s) IV Push once  fentaNYL   Patch  50 MICROgram(s)/Hr 1 Patch Transdermal every 72 hours  gabapentin 300 milliGRAM(s) Oral two times a day  heparin  Injectable 7500 Unit(s) SubCutaneous every 8 hours  insulin glargine Injectable (LANTUS) 20 Unit(s) SubCutaneous at bedtime  insulin lispro (HumaLOG) corrective regimen sliding scale   SubCutaneous three times a day before meals  insulin lispro Injectable (HumaLOG) 4 Unit(s) SubCutaneous three times a day before meals  levothyroxine 137 MICROGram(s) Oral <User Schedule>  methylPREDNISolone sodium succinate Injectable 10 milliGRAM(s) IV Push daily  pantoprazole    Tablet 40 milliGRAM(s) Oral before breakfast  sildenafil (REVATIO) 20 milliGRAM(s) Oral three times a day  simvastatin 20 milliGRAM(s) Oral at bedtime  traMADol 50 milliGRAM(s) Oral two times a day    MEDICATIONS  (PRN):  acetaminophen   Tablet .. 650 milliGRAM(s) Oral every 6 hours PRN Temp greater or equal to 38C (100.4F)  dextrose 40% Gel 15 Gram(s) Oral once PRN Blood Glucose LESS THAN 70 milliGRAM(s)/deciLiter  glucagon  Injectable 1 milliGRAM(s) IntraMuscular once PRN Glucose <70 milliGRAM(s)/deciLiter  oxyCODONE    IR 5 milliGRAM(s) Oral every 4 hours PRN moderate and severe pain      LABS:                        10.1   11.17 )-----------( 471      ( 01 Mar 2019 06:15 )             35.4     03-01    139  |  101  |  19  ----------------------------<  137<H>  4.1   |  20<L>  |  0.73    Ca    9.7      01 Mar 2019 06:15                MICROBIOLOGY:     RADIOLOGY:  [ ] Reviewed and interpreted by me    PULMONARY FUNCTION TESTS:    EKG: CHIEF COMPLAINT: Patient is a 63y old  Female who presents with a chief complaint of small bowel resection with complications (26 Feb 2019 09:00)      Interval Events: fever 100.6 overnight    REVIEW OF SYSTEMS:  Constitutional: denies chills, rigors or malaise  CV: denies chest pain  Resp: denies SOB  [x] All other systems negative      OBJECTIVE:  ICU Vital Signs Last 24 Hrs  T(C): 38.1 (02 Mar 2019 05:21), Max: 38.1 (02 Mar 2019 05:21)  T(F): 100.6 (02 Mar 2019 05:21), Max: 100.6 (02 Mar 2019 05:21)  HR: 115 (02 Mar 2019 06:44) (109 - 122)  BP: 125/73 (02 Mar 2019 05:08) (125/73 - 147/93)    RR: 20 (02 Mar 2019 05:08) (18 - 22)  SpO2: 97% (02 Mar 2019 06:44) (92% - 100%)        03-01 @ 07:01  -  03-02 @ 07:00  --------------------------------------------------------  IN: 118 mL / OUT: 25 mL / NET: 93 mL    CAPILLARY BLOOD GLUCOSE      POCT Blood Glucose.: 247 mg/dL (02 Mar 2019 11:41)  POCT Blood Glucose.: 236 mg/dL (02 Mar 2019 08:03)  POCT Blood Glucose.: 188 mg/dL (01 Mar 2019 22:44)  POCT Blood Glucose.: 239 mg/dL (01 Mar 2019 17:01)      HOSPITAL MEDICATIONS:  MEDICATIONS  (STANDING):  collagenase Ointment 1 Application(s) Topical daily  dextrose 5%. 1000 milliLiter(s) (50 mL/Hr) IV Continuous <Continuous>  dextrose 50% Injectable 12.5 Gram(s) IV Push once  dextrose 50% Injectable 25 Gram(s) IV Push once  dextrose 50% Injectable 25 Gram(s) IV Push once  fentaNYL   Patch  50 MICROgram(s)/Hr 1 Patch Transdermal every 72 hours  gabapentin 300 milliGRAM(s) Oral two times a day  heparin  Injectable 7500 Unit(s) SubCutaneous every 8 hours  insulin glargine Injectable (LANTUS) 20 Unit(s) SubCutaneous at bedtime  insulin lispro (HumaLOG) corrective regimen sliding scale   SubCutaneous three times a day before meals  insulin lispro Injectable (HumaLOG) 4 Unit(s) SubCutaneous three times a day before meals  levothyroxine 137 MICROGram(s) Oral <User Schedule>  methylPREDNISolone sodium succinate Injectable 10 milliGRAM(s) IV Push daily  pantoprazole    Tablet 40 milliGRAM(s) Oral before breakfast  sildenafil (REVATIO) 20 milliGRAM(s) Oral three times a day  simvastatin 20 milliGRAM(s) Oral at bedtime  traMADol 50 milliGRAM(s) Oral two times a day    MEDICATIONS  (PRN):  acetaminophen   Tablet .. 650 milliGRAM(s) Oral every 6 hours PRN Temp greater or equal to 38C (100.4F)  dextrose 40% Gel 15 Gram(s) Oral once PRN Blood Glucose LESS THAN 70 milliGRAM(s)/deciLiter  glucagon  Injectable 1 milliGRAM(s) IntraMuscular once PRN Glucose <70 milliGRAM(s)/deciLiter  oxyCODONE    IR 5 milliGRAM(s) Oral every 4 hours PRN moderate and severe pain      LABS:                                             10.1   11.17 )-----------( 471      ( 01 Mar 2019 06:15 )             35.4     03-01    139  |  101  |  19  ----------------------------<  137<H>  4.1   |  20<L>  |  0.73    Ca    9.7      01 Mar 2019 06:15        MICROBIOLOGY:

## 2019-03-02 NOTE — PHYSICAL THERAPY INITIAL EVALUATION ADULT - AMBULATION SKILLS, REHAB EVAL
rolling walker for short distances/needed assist/needs device
rolling walker/needed assist/needs device
needed assist/needs device/rolling walker

## 2019-03-02 NOTE — PHYSICAL THERAPY INITIAL EVALUATION ADULT - TRANSFER SKILLS, REHAB EVAL
needed assist/needs device
needs device/needed assist/rolling walker
needs device/rolling walker/needed assist

## 2019-03-02 NOTE — PHYSICAL THERAPY INITIAL EVALUATION ADULT - GENERAL OBSERVATIONS, REHAB EVAL
Patient found semi reclined in bed; +Hi flow 02 via NC; bilateral UE and LE edema noted. Patient awake and alert and willing to participate in PT today.
Consult received, chart reviewed. Patient received supine in bed, NAD, intubated, +cardiac monitor. Patient agreed to Re-Evaluation from Physical Therapist.
Consult received, chart reviewed. Patient received supine in bed, NAD. Patient agreed to Evaluation from Physical Therapist.

## 2019-03-02 NOTE — PROGRESS NOTE ADULT - PROBLEM SELECTOR PLAN 6
- a1c 7.3  - cont insulin  - endo note appreciated - a1c 7.3  - cont basal / bolus insulin  - endo note appreciated

## 2019-03-03 LAB
ALBUMIN SERPL ELPH-MCNC: 3.5 G/DL — SIGNIFICANT CHANGE UP (ref 3.3–5)
ALP SERPL-CCNC: 106 U/L — SIGNIFICANT CHANGE UP (ref 40–120)
ALT FLD-CCNC: 20 U/L — SIGNIFICANT CHANGE UP (ref 4–33)
ANION GAP SERPL CALC-SCNC: 14 MMO/L — SIGNIFICANT CHANGE UP (ref 7–14)
AST SERPL-CCNC: 16 U/L — SIGNIFICANT CHANGE UP (ref 4–32)
BASOPHILS # BLD AUTO: 0.05 K/UL — SIGNIFICANT CHANGE UP (ref 0–0.2)
BASOPHILS NFR BLD AUTO: 0.6 % — SIGNIFICANT CHANGE UP (ref 0–2)
BILIRUB SERPL-MCNC: 0.4 MG/DL — SIGNIFICANT CHANGE UP (ref 0.2–1.2)
BUN SERPL-MCNC: 20 MG/DL — SIGNIFICANT CHANGE UP (ref 7–23)
CALCIUM SERPL-MCNC: 9.2 MG/DL — SIGNIFICANT CHANGE UP (ref 8.4–10.5)
CHLORIDE SERPL-SCNC: 96 MMOL/L — LOW (ref 98–107)
CO2 SERPL-SCNC: 24 MMOL/L — SIGNIFICANT CHANGE UP (ref 22–31)
CREAT SERPL-MCNC: 0.75 MG/DL — SIGNIFICANT CHANGE UP (ref 0.5–1.3)
EOSINOPHIL # BLD AUTO: 0.57 K/UL — HIGH (ref 0–0.5)
EOSINOPHIL NFR BLD AUTO: 6.6 % — HIGH (ref 0–6)
GLUCOSE BLDC GLUCOMTR-MCNC: 117 MG/DL — HIGH (ref 70–99)
GLUCOSE BLDC GLUCOMTR-MCNC: 128 MG/DL — HIGH (ref 70–99)
GLUCOSE BLDC GLUCOMTR-MCNC: 147 MG/DL — HIGH (ref 70–99)
GLUCOSE BLDC GLUCOMTR-MCNC: 148 MG/DL — HIGH (ref 70–99)
GLUCOSE SERPL-MCNC: 117 MG/DL — HIGH (ref 70–99)
HCT VFR BLD CALC: 32.1 % — LOW (ref 34.5–45)
HGB BLD-MCNC: 9.1 G/DL — LOW (ref 11.5–15.5)
IMM GRANULOCYTES NFR BLD AUTO: 1 % — SIGNIFICANT CHANGE UP (ref 0–1.5)
LYMPHOCYTES # BLD AUTO: 2.12 K/UL — SIGNIFICANT CHANGE UP (ref 1–3.3)
LYMPHOCYTES # BLD AUTO: 24.7 % — SIGNIFICANT CHANGE UP (ref 13–44)
MCHC RBC-ENTMCNC: 28.1 PG — SIGNIFICANT CHANGE UP (ref 27–34)
MCHC RBC-ENTMCNC: 28.3 % — LOW (ref 32–36)
MCV RBC AUTO: 99.1 FL — SIGNIFICANT CHANGE UP (ref 80–100)
MONOCYTES # BLD AUTO: 0.87 K/UL — SIGNIFICANT CHANGE UP (ref 0–0.9)
MONOCYTES NFR BLD AUTO: 10.1 % — SIGNIFICANT CHANGE UP (ref 2–14)
NEUTROPHILS # BLD AUTO: 4.88 K/UL — SIGNIFICANT CHANGE UP (ref 1.8–7.4)
NEUTROPHILS NFR BLD AUTO: 57 % — SIGNIFICANT CHANGE UP (ref 43–77)
NRBC # FLD: 0.02 K/UL — LOW (ref 25–125)
PLATELET # BLD AUTO: 475 K/UL — HIGH (ref 150–400)
PMV BLD: 10 FL — SIGNIFICANT CHANGE UP (ref 7–13)
POTASSIUM SERPL-MCNC: 3.9 MMOL/L — SIGNIFICANT CHANGE UP (ref 3.5–5.3)
POTASSIUM SERPL-SCNC: 3.9 MMOL/L — SIGNIFICANT CHANGE UP (ref 3.5–5.3)
PROT SERPL-MCNC: 7.1 G/DL — SIGNIFICANT CHANGE UP (ref 6–8.3)
RBC # BLD: 3.24 M/UL — LOW (ref 3.8–5.2)
RBC # FLD: 17.5 % — HIGH (ref 10.3–14.5)
SODIUM SERPL-SCNC: 134 MMOL/L — LOW (ref 135–145)
SPECIMEN SOURCE: SIGNIFICANT CHANGE UP
SPECIMEN SOURCE: SIGNIFICANT CHANGE UP
WBC # BLD: 8.58 K/UL — SIGNIFICANT CHANGE UP (ref 3.8–10.5)
WBC # FLD AUTO: 8.58 K/UL — SIGNIFICANT CHANGE UP (ref 3.8–10.5)

## 2019-03-03 PROCEDURE — 99233 SBSQ HOSP IP/OBS HIGH 50: CPT

## 2019-03-03 RX ORDER — ACETAMINOPHEN 500 MG
650 TABLET ORAL EVERY 6 HOURS
Qty: 0 | Refills: 0 | Status: DISCONTINUED | OUTPATIENT
Start: 2019-03-03 | End: 2019-03-20

## 2019-03-03 RX ADMIN — Medication 1 APPLICATION(S): at 12:41

## 2019-03-03 RX ADMIN — FENTANYL CITRATE 1 PATCH: 50 INJECTION INTRAVENOUS at 19:00

## 2019-03-03 RX ADMIN — INSULIN GLARGINE 20 UNIT(S): 100 INJECTION, SOLUTION SUBCUTANEOUS at 21:11

## 2019-03-03 RX ADMIN — OXYCODONE HYDROCHLORIDE 5 MILLIGRAM(S): 5 TABLET ORAL at 21:24

## 2019-03-03 RX ADMIN — HEPARIN SODIUM 7500 UNIT(S): 5000 INJECTION INTRAVENOUS; SUBCUTANEOUS at 16:19

## 2019-03-03 RX ADMIN — FENTANYL CITRATE 1 PATCH: 50 INJECTION INTRAVENOUS at 08:18

## 2019-03-03 RX ADMIN — Medication 20 MILLIGRAM(S): at 16:20

## 2019-03-03 RX ADMIN — FENTANYL CITRATE 1 PATCH: 50 INJECTION INTRAVENOUS at 12:35

## 2019-03-03 RX ADMIN — Medication 137 MICROGRAM(S): at 06:34

## 2019-03-03 RX ADMIN — Medication 20 MILLIGRAM(S): at 06:34

## 2019-03-03 RX ADMIN — TRAMADOL HYDROCHLORIDE 50 MILLIGRAM(S): 50 TABLET ORAL at 06:35

## 2019-03-03 RX ADMIN — TRAMADOL HYDROCHLORIDE 50 MILLIGRAM(S): 50 TABLET ORAL at 17:58

## 2019-03-03 RX ADMIN — Medication 10 MILLIGRAM(S): at 06:34

## 2019-03-03 RX ADMIN — Medication 4 UNIT(S): at 08:55

## 2019-03-03 RX ADMIN — TRAMADOL HYDROCHLORIDE 50 MILLIGRAM(S): 50 TABLET ORAL at 18:21

## 2019-03-03 RX ADMIN — OXYCODONE HYDROCHLORIDE 5 MILLIGRAM(S): 5 TABLET ORAL at 21:45

## 2019-03-03 RX ADMIN — Medication 4 UNIT(S): at 12:40

## 2019-03-03 RX ADMIN — Medication 20 MILLIGRAM(S): at 21:11

## 2019-03-03 RX ADMIN — SIMVASTATIN 20 MILLIGRAM(S): 20 TABLET, FILM COATED ORAL at 21:12

## 2019-03-03 RX ADMIN — PANTOPRAZOLE SODIUM 40 MILLIGRAM(S): 20 TABLET, DELAYED RELEASE ORAL at 06:34

## 2019-03-03 RX ADMIN — Medication 4 UNIT(S): at 17:58

## 2019-03-03 RX ADMIN — GABAPENTIN 300 MILLIGRAM(S): 400 CAPSULE ORAL at 21:12

## 2019-03-03 RX ADMIN — GABAPENTIN 300 MILLIGRAM(S): 400 CAPSULE ORAL at 12:41

## 2019-03-03 NOTE — PROGRESS NOTE ADULT - SUBJECTIVE AND OBJECTIVE BOX
CHIEF COMPLAINT:  Patient is a 63y old  Female who presents with a chief complaint of   Interval Events:    REVIEW OF SYSTEMS:  Constitutional:   Eyes:  ENT:  CV:  Resp:  GI:  :  MSK:  Integumentary:  Neurological:  Psychiatric:  Endocrine:  Hematologic/Lymphatic:  Allergic/Immunologic:  [ ] All other systems negative  [ ] Unable to assess ROS because ________    OBJECTIVE:  ICU Vital Signs Last 24 Hrs  T(C): 37.4 (02 Mar 2019 22:28), Max: 37.5 (02 Mar 2019 17:00)  T(F): 99.4 (02 Mar 2019 22:28), Max: 99.5 (02 Mar 2019 17:00)  HR: 102 (03 Mar 2019 06:40) (100 - 114)  BP: 127/76 (02 Mar 2019 22:28) (127/76 - 135/85)  BP(mean): --  ABP: --  ABP(mean): --  RR: 20 (02 Mar 2019 22:28) (20 - 20)  SpO2: 99% (03 Mar 2019 06:40) (95% - 99%)        CAPILLARY BLOOD GLUCOSE      POCT Blood Glucose.: 116 mg/dL (02 Mar 2019 22:10)      PHYSICAL EXAM:  General:   HEENT:   Lymph Nodes:  Neck:   Respiratory:   Cardiovascular:   Abdomen:   Extremities:   Skin:   Neurological:  Psychiatry:    HOSPITAL MEDICATIONS:  MEDICATIONS  (STANDING):  collagenase Ointment 1 Application(s) Topical daily  dextrose 5%. 1000 milliLiter(s) (50 mL/Hr) IV Continuous <Continuous>  dextrose 50% Injectable 12.5 Gram(s) IV Push once  dextrose 50% Injectable 25 Gram(s) IV Push once  dextrose 50% Injectable 25 Gram(s) IV Push once  fentaNYL   Patch  50 MICROgram(s)/Hr 1 Patch Transdermal every 72 hours  gabapentin 300 milliGRAM(s) Oral two times a day  heparin  Injectable 7500 Unit(s) SubCutaneous every 8 hours  insulin glargine Injectable (LANTUS) 20 Unit(s) SubCutaneous at bedtime  insulin lispro (HumaLOG) corrective regimen sliding scale   SubCutaneous three times a day before meals  insulin lispro Injectable (HumaLOG) 4 Unit(s) SubCutaneous three times a day before meals  levothyroxine 137 MICROGram(s) Oral <User Schedule>  methylPREDNISolone sodium succinate Injectable 10 milliGRAM(s) IV Push daily  pantoprazole    Tablet 40 milliGRAM(s) Oral before breakfast  sildenafil (REVATIO) 20 milliGRAM(s) Oral three times a day  simvastatin 20 milliGRAM(s) Oral at bedtime  traMADol 50 milliGRAM(s) Oral two times a day    MEDICATIONS  (PRN):  acetaminophen   Tablet .. 650 milliGRAM(s) Oral every 6 hours PRN Temp greater or equal to 38C (100.4F)  dextrose 40% Gel 15 Gram(s) Oral once PRN Blood Glucose LESS THAN 70 milliGRAM(s)/deciLiter  glucagon  Injectable 1 milliGRAM(s) IntraMuscular once PRN Glucose <70 milliGRAM(s)/deciLiter  oxyCODONE    IR 5 milliGRAM(s) Oral every 4 hours PRN moderate and severe pain      LABS:                        9.1    8.58  )-----------( 475      ( 03 Mar 2019 04:41 )             32.1     03-03    134<L>  |  96<L>  |  20  ----------------------------<  117<H>  3.9   |  24  |  0.75    Ca    9.2      03 Mar 2019 04:41    TPro  7.1  /  Alb  3.5  /  TBili  0.4  /  DBili  x   /  AST  16  /  ALT  20  /  AlkPhos  106  03-03              MICROBIOLOGY:     RADIOLOGY:  [ ] Reviewed and interpreted by me    PULMONARY FUNCTION TESTS:    EKG:    I&O's Summary CHIEF COMPLAINT:  Patient is a 63y old  Female who presents with respiratory failure    Interval Events: No new events overnight    REVIEW OF SYSTEMS:  Constitutional: No acute distress  CV: Denies  Resp: Denies  GI: Denies  Extremities: C/o right leg pain on and off  [ X ] All other systems negative    OBJECTIVE:  ICU Vital Signs Last 24 Hrs  T(C): 37.4 (02 Mar 2019 22:28), Max: 37.5 (02 Mar 2019 17:00)  T(F): 99.4 (02 Mar 2019 22:28), Max: 99.5 (02 Mar 2019 17:00)  HR: 102 (03 Mar 2019 06:40) (100 - 114)  BP: 127/76 (02 Mar 2019 22:28) (127/76 - 135/85)  BP(mean): --  ABP: --  ABP(mean): --  RR: 20 (02 Mar 2019 22:28) (20 - 20)  SpO2: 99% (03 Mar 2019 06:40) (95% - 99%)    CAPILLARY BLOOD GLUCOSE  POCT Blood Glucose.: 116 mg/dL (02 Mar 2019 22:10)    · Constitutional	detailed exam	  · Constitutional Details	no distress; obese	  · Eyes	detailed exam	  · Eyes Details	PERRL; EOMI; conjunctiva clear	  · ENMT	No oral lesions; no gross abnormalities	  · Neck	detailed exam	  · Neck Details	supple; no JVD	  · Respiratory	detailed exam	  · Respiratory Details	breath sounds equal; respirations non-labored; clear to auscultation bilaterally; High flow NC in use, nocturnal BIPAP as needed	  · Cardiovascular	detailed exam	  · Cardiovascular Details	Tachy rate, regular rhythm	  · Cardiovascular Details	positive S1; positive S2	  · Gastrointestinal	detailed exam	  · GI Normal	soft; bowel sounds normal; obese	  · Extremities	detailed exam	  · Extremities Comments	trace pedal edema	  · Vascular	detailed exam	  · Radial Pulse	right normal; left normal	  · DP Pulse	right normal; left normal	  · Neurological	detailed exam	  · Neurological Details	strength decreased	  · Mental Status	awake and alert	  · Skin	detailed exam	  · Skin Details	mid abd dressing in place, clean dry and intact	    HOSPITAL MEDICATIONS:  MEDICATIONS  (STANDING):  collagenase Ointment 1 Application(s) Topical daily  dextrose 5%. 1000 milliLiter(s) (50 mL/Hr) IV Continuous <Continuous>  dextrose 50% Injectable 12.5 Gram(s) IV Push once  dextrose 50% Injectable 25 Gram(s) IV Push once  dextrose 50% Injectable 25 Gram(s) IV Push once  fentaNYL   Patch  50 MICROgram(s)/Hr 1 Patch Transdermal every 72 hours  gabapentin 300 milliGRAM(s) Oral two times a day  heparin  Injectable 7500 Unit(s) SubCutaneous every 8 hours  insulin glargine Injectable (LANTUS) 20 Unit(s) SubCutaneous at bedtime  insulin lispro (HumaLOG) corrective regimen sliding scale   SubCutaneous three times a day before meals  insulin lispro Injectable (HumaLOG) 4 Unit(s) SubCutaneous three times a day before meals  levothyroxine 137 MICROGram(s) Oral <User Schedule>  methylPREDNISolone sodium succinate Injectable 10 milliGRAM(s) IV Push daily  pantoprazole    Tablet 40 milliGRAM(s) Oral before breakfast  sildenafil (REVATIO) 20 milliGRAM(s) Oral three times a day  simvastatin 20 milliGRAM(s) Oral at bedtime  traMADol 50 milliGRAM(s) Oral two times a day    MEDICATIONS  (PRN):  acetaminophen   Tablet .. 650 milliGRAM(s) Oral every 6 hours PRN Temp greater or equal to 38C (100.4F)  dextrose 40% Gel 15 Gram(s) Oral once PRN Blood Glucose LESS THAN 70 milliGRAM(s)/deciLiter  glucagon  Injectable 1 milliGRAM(s) IntraMuscular once PRN Glucose <70 milliGRAM(s)/deciLiter  oxyCODONE    IR 5 milliGRAM(s) Oral every 4 hours PRN moderate and severe pain      LABS:                        9.1    8.58  )-----------( 475      ( 03 Mar 2019 04:41 )             32.1     03-03    134<L>  |  96<L>  |  20  ----------------------------<  117<H>  3.9   |  24  |  0.75    Ca    9.2      03 Mar 2019 04:41    TPro  7.1  /  Alb  3.5  /  TBili  0.4  /  DBili  x   /  AST  16  /  ALT  20  /  AlkPhos  106  03-03    I&O's Summary

## 2019-03-03 NOTE — PROGRESS NOTE ADULT - PROBLEM SELECTOR PLAN 4
- temp 100.6 on 3/2/19   - repeat blood cultures sent 3/2: NTD  - Trend fever curve, May resume abx if spikes again (ertapenem)

## 2019-03-03 NOTE — PROGRESS NOTE ADULT - ATTENDING COMMENTS
amazing she is off the vent, on Bilevel and high flow as needed  she has RA and continues the pred, she has also cleared her enterococcal bacteremia  all things considered she is somewhat stable, but very concerend for her overall

## 2019-03-04 DIAGNOSIS — Z63.9 PROBLEM RELATED TO PRIMARY SUPPORT GROUP, UNSPECIFIED: ICD-10-CM

## 2019-03-04 LAB
GLUCOSE BLDC GLUCOMTR-MCNC: 121 MG/DL — HIGH (ref 70–99)
GLUCOSE BLDC GLUCOMTR-MCNC: 136 MG/DL — HIGH (ref 70–99)
GLUCOSE BLDC GLUCOMTR-MCNC: 150 MG/DL — HIGH (ref 70–99)
GLUCOSE BLDC GLUCOMTR-MCNC: 160 MG/DL — HIGH (ref 70–99)

## 2019-03-04 PROCEDURE — 99233 SBSQ HOSP IP/OBS HIGH 50: CPT | Mod: GC

## 2019-03-04 RX ORDER — FUROSEMIDE 40 MG
40 TABLET ORAL ONCE
Qty: 0 | Refills: 0 | Status: COMPLETED | OUTPATIENT
Start: 2019-03-04 | End: 2019-03-04

## 2019-03-04 RX ORDER — ASCORBIC ACID 60 MG
1000 TABLET,CHEWABLE ORAL ONCE
Qty: 0 | Refills: 0 | Status: DISCONTINUED | OUTPATIENT
Start: 2019-03-04 | End: 2019-03-20

## 2019-03-04 RX ORDER — CHLORHEXIDINE GLUCONATE 213 G/1000ML
1 SOLUTION TOPICAL
Qty: 0 | Refills: 0 | Status: DISCONTINUED | OUTPATIENT
Start: 2019-03-04 | End: 2019-03-20

## 2019-03-04 RX ADMIN — OXYCODONE HYDROCHLORIDE 5 MILLIGRAM(S): 5 TABLET ORAL at 19:24

## 2019-03-04 RX ADMIN — Medication 20 MILLIGRAM(S): at 17:16

## 2019-03-04 RX ADMIN — Medication 4 UNIT(S): at 08:27

## 2019-03-04 RX ADMIN — HEPARIN SODIUM 7500 UNIT(S): 5000 INJECTION INTRAVENOUS; SUBCUTANEOUS at 05:59

## 2019-03-04 RX ADMIN — HEPARIN SODIUM 7500 UNIT(S): 5000 INJECTION INTRAVENOUS; SUBCUTANEOUS at 13:05

## 2019-03-04 RX ADMIN — SIMVASTATIN 20 MILLIGRAM(S): 20 TABLET, FILM COATED ORAL at 21:26

## 2019-03-04 RX ADMIN — GABAPENTIN 300 MILLIGRAM(S): 400 CAPSULE ORAL at 17:08

## 2019-03-04 RX ADMIN — OXYCODONE HYDROCHLORIDE 5 MILLIGRAM(S): 5 TABLET ORAL at 18:54

## 2019-03-04 RX ADMIN — Medication 10 MILLIGRAM(S): at 05:58

## 2019-03-04 RX ADMIN — GABAPENTIN 300 MILLIGRAM(S): 400 CAPSULE ORAL at 21:26

## 2019-03-04 RX ADMIN — Medication 20 MILLIGRAM(S): at 21:26

## 2019-03-04 RX ADMIN — Medication 4 UNIT(S): at 17:11

## 2019-03-04 RX ADMIN — PANTOPRAZOLE SODIUM 40 MILLIGRAM(S): 20 TABLET, DELAYED RELEASE ORAL at 06:00

## 2019-03-04 RX ADMIN — INSULIN GLARGINE 20 UNIT(S): 100 INJECTION, SOLUTION SUBCUTANEOUS at 21:25

## 2019-03-04 RX ADMIN — TRAMADOL HYDROCHLORIDE 50 MILLIGRAM(S): 50 TABLET ORAL at 17:09

## 2019-03-04 RX ADMIN — FENTANYL CITRATE 1 PATCH: 50 INJECTION INTRAVENOUS at 08:26

## 2019-03-04 RX ADMIN — TRAMADOL HYDROCHLORIDE 50 MILLIGRAM(S): 50 TABLET ORAL at 06:30

## 2019-03-04 RX ADMIN — Medication 4 UNIT(S): at 13:04

## 2019-03-04 RX ADMIN — Medication 137 MICROGRAM(S): at 05:59

## 2019-03-04 RX ADMIN — TRAMADOL HYDROCHLORIDE 50 MILLIGRAM(S): 50 TABLET ORAL at 05:57

## 2019-03-04 RX ADMIN — Medication 20 MILLIGRAM(S): at 05:59

## 2019-03-04 RX ADMIN — Medication 1: at 17:11

## 2019-03-04 RX ADMIN — Medication 1 APPLICATION(S): at 13:05

## 2019-03-04 RX ADMIN — CHLORHEXIDINE GLUCONATE 1 APPLICATION(S): 213 SOLUTION TOPICAL at 17:12

## 2019-03-04 RX ADMIN — Medication 40 MILLIGRAM(S): at 18:54

## 2019-03-04 RX ADMIN — FENTANYL CITRATE 1 PATCH: 50 INJECTION INTRAVENOUS at 19:00

## 2019-03-04 RX ADMIN — TRAMADOL HYDROCHLORIDE 50 MILLIGRAM(S): 50 TABLET ORAL at 18:09

## 2019-03-04 SDOH — SOCIAL STABILITY - SOCIAL INSECURITY: PROBLEM RELATED TO PRIMARY SUPPORT GROUP, UNSPECIFIED: Z63.9

## 2019-03-04 NOTE — CONSULT NOTE ADULT - PROBLEM SELECTOR PROBLEM 1
Conflicted attitude towards treatment
Patient incapable of making informed decisions
SBO (small bowel obstruction)
Type 2 diabetes mellitus with hypoglycemia, with long-term current use of insulin
Patient incapable of making informed decisions
RA (Rheumatoid Arthritis)

## 2019-03-04 NOTE — CONSULT NOTE ADULT - ASSESSMENT
Physicians are not obligated to perform or partake in interventions where risks exceed benefit. Should Loren decompensate the present mutually witnessed understanding is that she would prefer intubation with the understanding that the team will not intubate electively without consent and assent for tracheostomy. the patient was fully informed on the risks and benefits of intubation including the alternative of doing nothing.

## 2019-03-04 NOTE — CHART NOTE - NSCHARTNOTEFT_GEN_A_CORE
[id number] Met with patient at bedside. Patient with BIPAP in place, she complained of being cold, heat was raised. Patient able to answer questions and follow the conversation.  At bedside were two sons who joined in the conversation.  Loren was able to reiterate her past wishes to not have a tracheostomy, and was asked if it was the only option, would she agree to it, to which she nodded and said yes.  Explained that the team will be rounding at 2pm to speak with her and ethics would be available. Ethics answered some questions from both sons, and emotional support provided, acknowledging the difficulty of the stress of their mother being ill.     Ethics will update accordingly.

## 2019-03-04 NOTE — PROGRESS NOTE ADULT - SUBJECTIVE AND OBJECTIVE BOX
CHIEF COMPLAINT:    Interval Events:    REVIEW OF SYSTEMS:  Constitutional:   Eyes:  ENT:  CV:  Resp:  GI:  :  MSK:  Integumentary:  Neurological:  Psychiatric:  Endocrine:  Hematologic/Lymphatic:  Allergic/Immunologic:  [ ] All other systems negative  [ ] Unable to assess ROS because ________    OBJECTIVE:  ICU Vital Signs Last 24 Hrs  T(C): 36.4 (04 Mar 2019 06:05), Max: 37.5 (03 Mar 2019 16:00)  T(F): 97.6 (04 Mar 2019 06:05), Max: 99.5 (03 Mar 2019 16:00)  HR: 107 (04 Mar 2019 07:13) (104 - 121)  BP: 132/88 (04 Mar 2019 06:05) (128/81 - 139/82)  BP(mean): --  ABP: --  ABP(mean): --  RR: 18 (04 Mar 2019 06:05) (18 - 22)  SpO2: 97% (04 Mar 2019 07:13) (93% - 100%)        CAPILLARY BLOOD GLUCOSE      POCT Blood Glucose.: 150 mg/dL (04 Mar 2019 07:57)      PHYSICAL EXAM:  General:   HEENT:   Lymph Nodes:  Neck:   Respiratory:   Cardiovascular:   Abdomen:   Extremities:   Skin:   Neurological:  Psychiatry:    HOSPITAL MEDICATIONS:  MEDICATIONS  (STANDING):  collagenase Ointment 1 Application(s) Topical daily  dextrose 5%. 1000 milliLiter(s) (50 mL/Hr) IV Continuous <Continuous>  dextrose 50% Injectable 12.5 Gram(s) IV Push once  dextrose 50% Injectable 25 Gram(s) IV Push once  dextrose 50% Injectable 25 Gram(s) IV Push once  fentaNYL   Patch  50 MICROgram(s)/Hr 1 Patch Transdermal every 72 hours  gabapentin 300 milliGRAM(s) Oral two times a day  heparin  Injectable 7500 Unit(s) SubCutaneous every 8 hours  insulin glargine Injectable (LANTUS) 20 Unit(s) SubCutaneous at bedtime  insulin lispro (HumaLOG) corrective regimen sliding scale   SubCutaneous three times a day before meals  insulin lispro Injectable (HumaLOG) 4 Unit(s) SubCutaneous three times a day before meals  levothyroxine 137 MICROGram(s) Oral <User Schedule>  methylPREDNISolone sodium succinate Injectable 10 milliGRAM(s) IV Push daily  pantoprazole    Tablet 40 milliGRAM(s) Oral before breakfast  sildenafil (REVATIO) 20 milliGRAM(s) Oral three times a day  simvastatin 20 milliGRAM(s) Oral at bedtime  traMADol 50 milliGRAM(s) Oral two times a day    MEDICATIONS  (PRN):  acetaminophen   Tablet .. 650 milliGRAM(s) Oral every 6 hours PRN Temp greater or equal to 38C (100.4F), Moderate Pain (4 - 6)  dextrose 40% Gel 15 Gram(s) Oral once PRN Blood Glucose LESS THAN 70 milliGRAM(s)/deciLiter  glucagon  Injectable 1 milliGRAM(s) IntraMuscular once PRN Glucose <70 milliGRAM(s)/deciLiter  oxyCODONE    IR 5 milliGRAM(s) Oral every 4 hours PRN moderate and severe pain      LABS:                        9.1    8.58  )-----------( 475      ( 03 Mar 2019 04:41 )             32.1     03-03    134<L>  |  96<L>  |  20  ----------------------------<  117<H>  3.9   |  24  |  0.75    Ca    9.2      03 Mar 2019 04:41    TPro  7.1  /  Alb  3.5  /  TBili  0.4  /  DBili  x   /  AST  16  /  ALT  20  /  AlkPhos  106  03-03              MICROBIOLOGY:     RADIOLOGY:  [ ] Reviewed and interpreted by me    PULMONARY FUNCTION TESTS:    EKG: CHIEF COMPLAINT: Patient is a 63y old  Female who presents with a chief complaint of Respiratory failure in the setting of multifocal PNA (03 Mar 2019 07:31)      Interval Events: none overnight     REVIEW OF SYSTEMS:  Constitutional: No fever or chills   Eyes:  ENT:  CV: Denies   Resp: + COREAS   GI: Denies   MSK: Weakness   [x ] All other systems negative  [ ] Unable to assess ROS because ________    OBJECTIVE:  ICU Vital Signs Last 24 Hrs  T(C): 36.4 (04 Mar 2019 06:05), Max: 37.5 (03 Mar 2019 16:00)  T(F): 97.6 (04 Mar 2019 06:05), Max: 99.5 (03 Mar 2019 16:00)  HR: 107 (04 Mar 2019 07:13) (104 - 121)  BP: 132/88 (04 Mar 2019 06:05) (128/81 - 139/82)  BP(mean): --  ABP: --  ABP(mean): --  RR: 18 (04 Mar 2019 06:05) (18 - 22)  SpO2: 97% (04 Mar 2019 07:13) (93% - 100%)        CAPILLARY BLOOD GLUCOSE      POCT Blood Glucose.: 150 mg/dL (04 Mar 2019 07:57)        HOSPITAL MEDICATIONS:  MEDICATIONS  (STANDING):  collagenase Ointment 1 Application(s) Topical daily  dextrose 5%. 1000 milliLiter(s) (50 mL/Hr) IV Continuous <Continuous>  dextrose 50% Injectable 12.5 Gram(s) IV Push once  dextrose 50% Injectable 25 Gram(s) IV Push once  dextrose 50% Injectable 25 Gram(s) IV Push once  fentaNYL   Patch  50 MICROgram(s)/Hr 1 Patch Transdermal every 72 hours  gabapentin 300 milliGRAM(s) Oral two times a day  heparin  Injectable 7500 Unit(s) SubCutaneous every 8 hours  insulin glargine Injectable (LANTUS) 20 Unit(s) SubCutaneous at bedtime  insulin lispro (HumaLOG) corrective regimen sliding scale   SubCutaneous three times a day before meals  insulin lispro Injectable (HumaLOG) 4 Unit(s) SubCutaneous three times a day before meals  levothyroxine 137 MICROGram(s) Oral <User Schedule>  methylPREDNISolone sodium succinate Injectable 10 milliGRAM(s) IV Push daily  pantoprazole    Tablet 40 milliGRAM(s) Oral before breakfast  sildenafil (REVATIO) 20 milliGRAM(s) Oral three times a day  simvastatin 20 milliGRAM(s) Oral at bedtime  traMADol 50 milliGRAM(s) Oral two times a day    MEDICATIONS  (PRN):  acetaminophen   Tablet .. 650 milliGRAM(s) Oral every 6 hours PRN Temp greater or equal to 38C (100.4F), Moderate Pain (4 - 6)  dextrose 40% Gel 15 Gram(s) Oral once PRN Blood Glucose LESS THAN 70 milliGRAM(s)/deciLiter  glucagon  Injectable 1 milliGRAM(s) IntraMuscular once PRN Glucose <70 milliGRAM(s)/deciLiter  oxyCODONE    IR 5 milliGRAM(s) Oral every 4 hours PRN moderate and severe pain      LABS:                        9.1    8.58  )-----------( 475      ( 03 Mar 2019 04:41 )             32.1     03-03    134<L>  |  96<L>  |  20  ----------------------------<  117<H>  3.9   |  24  |  0.75    Ca    9.2      03 Mar 2019 04:41    TPro  7.1  /  Alb  3.5  /  TBili  0.4  /  DBili  x   /  AST  16  /  ALT  20  /  AlkPhos  106  03-03              MICROBIOLOGY:     RADIOLOGY:  [ ] Reviewed and interpreted by me    PULMONARY FUNCTION TESTS:    EKG:

## 2019-03-04 NOTE — PROGRESS NOTE ADULT - PROBLEM SELECTOR PLAN 1
- prolonged intubation, now extubated to high flow/BIPAP  - BIPAP HS and PRN  - high flow, wean as tolerated  - cont sildenafil  - cont steroids  - chest PT - prolonged intubation, now extubated to high flow/BIPAP  - BIPAP HS and PRN  - high flow, wean as tolerated  - cont sildenafil  - cont steroids  - chest PT  - lasix x 1

## 2019-03-04 NOTE — PROGRESS NOTE ADULT - ATTENDING COMMENTS
Remains on HFNC and bilevel.  Resume diuretic as patient is developing worsening edema.  Surgery to evaluated abd wound to possibly remove retention sutures.  Overall prognosis is poor.  Multi-disciplinary family meeting scheduled for today.

## 2019-03-04 NOTE — PROGRESS NOTE ADULT - SUBJECTIVE AND OBJECTIVE BOX
ACUTE CARE SURGERY  Pager: [phone]      INTERVAL EVENTS/SUBJECTIVE:   Patient awake, on BIPAP in RCU.   Midline dressing taken down.   ______________________________________________  OBJECTIVE:   T(C): 36.4 (03-04-19 @ 06:05), Max: 37.5 (03-03-19 @ 16:00)  HR: 109 (03-04-19 @ 10:40) (104 - 121)  BP: 132/88 (03-04-19 @ 06:05) (128/81 - 139/82)  RR: 18 (03-04-19 @ 06:05) (18 - 22)  SpO2: 98% (03-04-19 @ 10:40) (96% - 100%)  Wt(kg): --  CAPILLARY BLOOD GLUCOSE      POCT Blood Glucose.: 136 mg/dL (04 Mar 2019 12:08)  POCT Blood Glucose.: 150 mg/dL (04 Mar 2019 07:57)  POCT Blood Glucose.: 148 mg/dL (03 Mar 2019 21:07)  POCT Blood Glucose.: 147 mg/dL (03 Mar 2019 17:06)  POCT Blood Glucose.: 117 mg/dL (03 Mar 2019 12:18)    I&O's Detail      Physical exam:  Gen: NAD  Abdomen: Midline wound with some retention sutures still in place. Pink, superficial granulation tissue down midline.  ______________________________________________  LABS:  CBC Full  -  ( 03 Mar 2019 04:41 )  WBC Count : 8.58 K/uL  Hemoglobin : 9.1 g/dL  Hematocrit : 32.1 %  Platelet Count - Automated : 475 K/uL  Mean Cell Volume : 99.1 fL  Mean Cell Hemoglobin : 28.1 pg  Mean Cell Hemoglobin Concentration : 28.3 %  Auto Neutrophil # : 4.88 K/uL  Auto Lymphocyte # : 2.12 K/uL  Auto Monocyte # : 0.87 K/uL  Auto Eosinophil # : 0.57 K/uL  Auto Basophil # : 0.05 K/uL  Auto Neutrophil % : 57.0 %  Auto Lymphocyte % : 24.7 %  Auto Monocyte % : 10.1 %  Auto Eosinophil % : 6.6 %  Auto Basophil % : 0.6 %    03-03    134<L>  |  96<L>  |  20  ----------------------------<  117<H>  3.9   |  24  |  0.75    Ca    9.2      03 Mar 2019 04:41    TPro  7.1  /  Alb  3.5  /  TBili  0.4  /  DBili  x   /  AST  16  /  ALT  20  /  AlkPhos  106  03-03

## 2019-03-04 NOTE — PROGRESS NOTE ADULT - SUBJECTIVE AND OBJECTIVE BOX
SUBJECTIVE AND OBJECTIVE:  pt awake, alert  on high flow.  Complains of pain in right hip.  Asked to be a part of goc with team  INTERVAL HPI/OVERNIGHT EVENTS:     DNR on chart:   Allergies    No Known Allergies    Intolerances    Seroquel (Other)  MEDICATIONS  (STANDING):  ascorbic acid Syrup 1000 milliGRAM(s) Oral once  chlorhexidine 4% Liquid 1 Application(s) Topical <User Schedule>  collagenase Ointment 1 Application(s) Topical daily  dextrose 5%. 1000 milliLiter(s) (50 mL/Hr) IV Continuous <Continuous>  dextrose 50% Injectable 12.5 Gram(s) IV Push once  dextrose 50% Injectable 25 Gram(s) IV Push once  dextrose 50% Injectable 25 Gram(s) IV Push once  fentaNYL   Patch  50 MICROgram(s)/Hr 1 Patch Transdermal every 72 hours  furosemide   Injectable 40 milliGRAM(s) IV Push once  gabapentin 300 milliGRAM(s) Oral two times a day  heparin  Injectable 7500 Unit(s) SubCutaneous every 8 hours  insulin glargine Injectable (LANTUS) 20 Unit(s) SubCutaneous at bedtime  insulin lispro (HumaLOG) corrective regimen sliding scale   SubCutaneous three times a day before meals  insulin lispro Injectable (HumaLOG) 4 Unit(s) SubCutaneous three times a day before meals  levothyroxine 137 MICROGram(s) Oral <User Schedule>  methylPREDNISolone sodium succinate Injectable 10 milliGRAM(s) IV Push daily  pantoprazole    Tablet 40 milliGRAM(s) Oral before breakfast  sildenafil (REVATIO) 20 milliGRAM(s) Oral three times a day  simvastatin 20 milliGRAM(s) Oral at bedtime  traMADol 50 milliGRAM(s) Oral two times a day    MEDICATIONS  (PRN):  acetaminophen   Tablet .. 650 milliGRAM(s) Oral every 6 hours PRN Temp greater or equal to 38C (100.4F), Moderate Pain (4 - 6)  dextrose 40% Gel 15 Gram(s) Oral once PRN Blood Glucose LESS THAN 70 milliGRAM(s)/deciLiter  glucagon  Injectable 1 milliGRAM(s) IntraMuscular once PRN Glucose <70 milliGRAM(s)/deciLiter  oxyCODONE    IR 5 milliGRAM(s) Oral every 4 hours PRN moderate and severe pain      ITEMS UNCHECKED ARE NOT PRESENT    PRESENT SYMPTOMS: [ ]Unable to obtain due to poor mentation   Source if other than patient:  [ ]Family   [ ]Team     Pain (Impact on QOL):  yes  Location: right hip  Minimal acceptable level (0-10 scale):  3/10          Aggravating factors: movement  Quality: dull  Radiation:non  Severity (0-10 scale):  7/10  Timing: comes and goes    Dyspnea:                           [x ]Mild [ ]Moderate [ ]Severe  Anxiety:                             [x ]Mild [ ]Moderate [ ]Severe  Fatigue:                             [ ]Mild [ ]Moderate [ x]Severe  Nausea:           none                  [ ]Mild [ ]Moderate [ ]Severe  Loss of appetite:  none            [ ]Mild [ ]Moderate [ ]Severe  Constipation:        none            [ ]Mild [ ]Moderate [ ]Severe    PAIN AD Score:	  http://geriatrictoolkit.[facility]/cog/painad.pdf (Ctrl + left click to view)    Other Symptoms:  [ x]All other review of systems negative     Karnofsky Performance Score/Palliative Performance Status Version 2:    40     %    http://palliative.info/resource_material/PPSv2.pdf  PHYSICAL EXAM:  Vital Signs Last 24 Hrs  T(C): 37.8 (04 Mar 2019 15:52), Max: 37.8 (04 Mar 2019 15:52)  T(F): 100.1 (04 Mar 2019 15:52), Max: 100.1 (04 Mar 2019 15:52)  HR: 121 (04 Mar 2019 15:52) (107 - 121)  BP: 102/58 (04 Mar 2019 15:52) (102/58 - 132/88)  BP(mean): --  RR: 20 (04 Mar 2019 15:52) (18 - 20)  SpO2: 100% (04 Mar 2019 15:52) (86% - 100%) I&O's Summary   GENERAL:  [x ]Alert  [x ]Oriented x 3  [ ]Lethargic  [ ]Cachexia  [ ]Unarousable  [ ]Verbal  [ ]Non-Verbal    Behavioral:   [ ] Anxiety  [ ] Delirium [ ] Agitation [ ] Other    HEENT:  [ ]Normal   [x ]Dry mouth   [ ]ET Tube/Trach  [ ]Oral lesions  +high flow    PULMONARY:   [ ]Clear [x ]Tachypnea  [ ]Audible excessive secretions   [ ]Rhonchi        [ ]Right [ ]Left [ ]Bilateral  [ ]Crackles        [ ]Right [ ]Left [ ]Bilateral  [ ]Wheezing     [ ]Right [ ]Left [ ]Bilateral    CARDIOVASCULAR:    [ ]Regular [ ]Irregular [ x]Tachy  [ ]Ubaldo [ ]Murmur [ ]Other    GASTROINTESTINAL:  [ x]Soft  [ ]Distended   [x ]+BS  [x ]Non tender [ ]Tender  [ ]PEG [ ]OGT/ NGT   Last BM:   02-28-19 @ 07:01  -  03-01-19 @ 07:00  --------------------------------------------------------  OUT: 50 mL    03-01-19 @ 07:01  -  03-02-19 @ 07:00  --------------------------------------------------------  OUT: 25 mL     GENITOURINARY:  [ ]Normal [ ] Incontinent   [ ]Oliguria/Anuria   [x ]Antoine    MUSCULOSKELETAL:   [ ]Normal   [ ]Weakness  [x ]Bed/Wheelchair bound [ ]Edema    NEUROLOGIC:   [ x]No focal deficits  [ ] Cognitive impairment  [ ] Dysphagia [ ]Dysarthria [ ] Paresis [ ]Other     SKIN:   [ x]Normal   [ ]Pressure ulcer(s)  [ ]Rash    CRITICAL CARE:  [ ] Shock Present  [ ]Septic [ ]Cardiogenic [ ]Neurologic [ ]Hypovolemic  [ ]  Vasopressors [ ]  Inotropes   [ ] Respiratory failure present  [ ] Acute  [ ] Chronic [ ] Hypoxic  [ ] Hypercarbic [ ] Other  [ ] Other organ failure     LABS:                        9.1    8.58  )-----------( 475      ( 03 Mar 2019 04:41 )             32.1   03-03    134<L>  |  96<L>  |  20  ----------------------------<  117<H>  3.9   |  24  |  0.75    Ca    9.2      03 Mar 2019 04:41    TPro  7.1  /  Alb  3.5  /  TBili  0.4  /  DBili  x   /  AST  16  /  ALT  20  /  AlkPhos  106  03-03        RADIOLOGY & ADDITIONAL STUDIES:    Protein Calorie Malnutrition Present: [ ] yes [ ] no  [ ] PPSV2 < or = 30%  [ ] significant weight loss [ ] poor nutritional intake [ ] anasarca [ ] catabolic state Albumin, Serum: 3.5 g/dL (03-03-19 @ 04:41)  Artificial Nutrition [ ]     REFERRALS:   [ ]Chaplaincy  [ ] Hospice  [ ]Child Life  [ ]Social Work  [ ]Case management [ ]Holistic Therapy   Goals of Care Document:

## 2019-03-04 NOTE — PROGRESS NOTE ADULT - PROBLEM SELECTOR PLAN 1
now extubated, on high flow  concern for further episodes and goc discussion to understands pt's AD and goc

## 2019-03-04 NOTE — PROGRESS NOTE ADULT - PROBLEM SELECTOR PLAN 2
on fentanyl patch, tramadol, neurontin and oxy ir prn  would rec having pt take oxy ir prn especially prior to working with PT

## 2019-03-04 NOTE — CONSULT NOTE ADULT - SUBJECTIVE AND OBJECTIVE BOX
HPI:  63 year old woman with COPD on home O2 (3L), moderate pulmonary HTN, DM, RA on prednisone (10 mg daily x 30 years), HTN, s/p CVA (no residual deficit), s/p  x2 presents with abdominal pain for one day.  CT scan revealed SBO with transition point in the lower abdomen with associated interloop fluid and mesenteric edema. (15 Dec 2018 23:33).  Patient now at prolonged course of hospitalization approximately 3 months requiring multiple reintubation. During loss of capacity, son Norris, health proxy struggled with family dynamic of his siblings and with medical decision regarding tracheostomy Patient now with restored capacity    Team and patient meeting. Patient present, Attendings Dr. Lisker (RCU), Dr. Salazar (palliative Care), BARRETT Man, RN Manager. RAMONA Parker, Resident Dr. Davis, Neto Altamirano and SOFI Krishnamurthy    Patient wearing Hi Flow oxygen present were her sons Juan Pablo and Norris (HCP) and by phone Heavenly (daughter). At onset of meeting IVANIA Krishnamurthy and Dr. Lisker prepared patient. Meeting initially recorded without team permission by Juan Pablo. Recording stopped and Ethics offered another meeting for missing siblings. Loren explained she lives day to day with God's help. Current status of pulmonary state discussed by Dr. Lisker. Options regarding palliative care versus tracheostomy reviewed. Dr. Salazar outlined in the event of tracheostomy disposition would most likely be prolonged nursing home. Loren explained she would wish to return home with nursing care. Dr, Lisker explained the complexities related to that request.    Loren stated that all of her children would be involved in the decision and that she would leave detailed descriptions. The difficulties of not citing advanced preferences were discussed by Ethics. At that point Juan Pablo attempted to have his mother change her choice for health proxy citing a consensus among the siblings. Heavenly  thanked team as did patient expressing deep regard and trust for Dr. Lisker. Heavenly will arrive on  and explained that the family conflict would be discussed privately. Loren affirmed her health proxy decision.    Dr. Lisker clearly explained that the edict of physician was to do no harm. It appears that patient does not want palliation but instead desires measures related to advanced airway. As such, Dr. Lisker affirmed that a decision for intubation would be a decision for trache. Loren nodded understanding and assent.

## 2019-03-04 NOTE — PROGRESS NOTE ADULT - ASSESSMENT
63F w/ multiple respiratory comorbidities p/w SBO s/p ex-lap with findings of healthy bowel c/b evisceration of bowel requiring placement of retention sutures, difficult to wean off ventilation but eventually extubated and downgraded to RCU 01/26; readmitted to MICU due to hypoxic respiratory, now back in RCU on BIPAP.   Midline wound healing well.     - Continue BID dressing changes to midline wound.  - Will discuss with attending when the rest of her retention sutures can be taken out.     Pager [phone]

## 2019-03-04 NOTE — CHART NOTE - NSCHARTNOTEFT_GEN_A_CORE
Family meeting held today at the patient's bedside to discuss goals of care.  Patient is awake and alert, with full capacity.  Also present at the meetin of patient's sons - Norris and Juan Pablo.  Patient's daughter, Heavenly, on the phone  Ethics, Palliative Care, SW, aNM and pulmonary fellow.  Currently, patient is stable, breathing comfortably when at rest, but on HFNC 80% and 40lpm daytime, nocturnal NIV.  While explaining that we are hopeful she will continue to improve, it was also explained that there is a significant chance that her cardiac or respiratory status could again deteriorate.  Patient has been in the hospital for nearly 3months, intubated 3 times, most recent ICU stay also involved shock and multiple pressors.  On each of the prior occasions, the family (6 children) were unable to come to an agreement regarding a tracheostomy, and much in-family fighting occurred as well.  I explained to Loren that , now that she is fully alert, we would like for her to make a decision regarding her GOC, and express them to her family.  In the event that she worsens, I explained there will be 2 options:  -comfort care, assured of no suffering, and allow to pass naturally  -reintuabtion, which would mean a trach and likely tx to a nursing home.  AND, I clearly explained, with assistance from Ethics and Palliative, that at this point, re-intubation without a plan for a trach would constitute not only medical futility but would be in violation of do no harm.  Therefore, unless we have a consent for a tracheostomy from the patient, our physicians would not intubate her, and would make her comfort care.  The patient and her 3 children present all expressed that they understand this.    Patient's daugher, Heavenly, will be arriving in [place] tomorrow. She plans on arranging an internal family meeting with her mom and all the siblings to come to an understanding of their familial discordances , as well as to assist her mother in making a decision.

## 2019-03-04 NOTE — CONSULT NOTE ADULT - PROBLEM SELECTOR PROBLEM 2
Conflicted attitude towards treatment
Family dysfunction
Type 2 diabetes mellitus with hypoglycemia, with long-term current use of insulin
Adult Hypothyroidism
Pain

## 2019-03-04 NOTE — PROGRESS NOTE ADULT - PROBLEM SELECTOR PLAN 7
- SBO s/p ex-lap with findings of healthy bowel c/b evisceration of bowel requiring placement of retention sutures  - sx note appreciated  - cont dressing to wound - SBO s/p ex-lap with findings of healthy bowel c/b evisceration of bowel requiring placement of retention sutures  - sx saw pt today and evaluated wound dw attending re: removal of retention sutures   - cont dressing to wound

## 2019-03-05 LAB
ALBUMIN SERPL ELPH-MCNC: 3.1 G/DL — LOW (ref 3.3–5)
ALP SERPL-CCNC: 115 U/L — SIGNIFICANT CHANGE UP (ref 40–120)
ALT FLD-CCNC: 18 U/L — SIGNIFICANT CHANGE UP (ref 4–33)
ANION GAP SERPL CALC-SCNC: 19 MMO/L — HIGH (ref 7–14)
AST SERPL-CCNC: 35 U/L — HIGH (ref 4–32)
BACTERIA BLD CULT: SIGNIFICANT CHANGE UP
BACTERIA BLD CULT: SIGNIFICANT CHANGE UP
BASOPHILS # BLD AUTO: 0.1 K/UL — SIGNIFICANT CHANGE UP (ref 0–0.2)
BASOPHILS NFR BLD AUTO: 1 % — SIGNIFICANT CHANGE UP (ref 0–2)
BILIRUB SERPL-MCNC: 0.4 MG/DL — SIGNIFICANT CHANGE UP (ref 0.2–1.2)
BUN SERPL-MCNC: 15 MG/DL — SIGNIFICANT CHANGE UP (ref 7–23)
CALCIUM SERPL-MCNC: 9.4 MG/DL — SIGNIFICANT CHANGE UP (ref 8.4–10.5)
CHLORIDE SERPL-SCNC: 93 MMOL/L — LOW (ref 98–107)
CO2 SERPL-SCNC: 19 MMOL/L — LOW (ref 22–31)
CREAT SERPL-MCNC: 0.8 MG/DL — SIGNIFICANT CHANGE UP (ref 0.5–1.3)
EOSINOPHIL # BLD AUTO: 0.71 K/UL — HIGH (ref 0–0.5)
EOSINOPHIL NFR BLD AUTO: 6.9 % — HIGH (ref 0–6)
GLUCOSE BLDC GLUCOMTR-MCNC: 172 MG/DL — HIGH (ref 70–99)
GLUCOSE BLDC GLUCOMTR-MCNC: 175 MG/DL — HIGH (ref 70–99)
GLUCOSE BLDC GLUCOMTR-MCNC: 180 MG/DL — HIGH (ref 70–99)
GLUCOSE BLDC GLUCOMTR-MCNC: 186 MG/DL — HIGH (ref 70–99)
GLUCOSE BLDC GLUCOMTR-MCNC: 196 MG/DL — HIGH (ref 70–99)
GLUCOSE SERPL-MCNC: 157 MG/DL — HIGH (ref 70–99)
HCT VFR BLD CALC: 37 % — SIGNIFICANT CHANGE UP (ref 34.5–45)
HGB BLD-MCNC: 10.3 G/DL — LOW (ref 11.5–15.5)
IMM GRANULOCYTES NFR BLD AUTO: 2.3 % — HIGH (ref 0–1.5)
LYMPHOCYTES # BLD AUTO: 3.32 K/UL — HIGH (ref 1–3.3)
LYMPHOCYTES # BLD AUTO: 32 % — SIGNIFICANT CHANGE UP (ref 13–44)
MCHC RBC-ENTMCNC: 27.8 % — LOW (ref 32–36)
MCHC RBC-ENTMCNC: 28.1 PG — SIGNIFICANT CHANGE UP (ref 27–34)
MCV RBC AUTO: 100.8 FL — HIGH (ref 80–100)
MONOCYTES # BLD AUTO: 1.1 K/UL — HIGH (ref 0–0.9)
MONOCYTES NFR BLD AUTO: 10.6 % — SIGNIFICANT CHANGE UP (ref 2–14)
NEUTROPHILS # BLD AUTO: 4.89 K/UL — SIGNIFICANT CHANGE UP (ref 1.8–7.4)
NEUTROPHILS NFR BLD AUTO: 47.2 % — SIGNIFICANT CHANGE UP (ref 43–77)
NRBC # FLD: 0.05 K/UL — LOW (ref 25–125)
PLATELET # BLD AUTO: 421 K/UL — HIGH (ref 150–400)
PMV BLD: 10 FL — SIGNIFICANT CHANGE UP (ref 7–13)
POTASSIUM SERPL-MCNC: 4.1 MMOL/L — SIGNIFICANT CHANGE UP (ref 3.5–5.3)
POTASSIUM SERPL-SCNC: 4.1 MMOL/L — SIGNIFICANT CHANGE UP (ref 3.5–5.3)
PROT SERPL-MCNC: 7.5 G/DL — SIGNIFICANT CHANGE UP (ref 6–8.3)
RBC # BLD: 3.67 M/UL — LOW (ref 3.8–5.2)
RBC # FLD: 17.4 % — HIGH (ref 10.3–14.5)
SODIUM SERPL-SCNC: 131 MMOL/L — LOW (ref 135–145)
WBC # BLD: 10.36 K/UL — SIGNIFICANT CHANGE UP (ref 3.8–10.5)
WBC # FLD AUTO: 10.36 K/UL — SIGNIFICANT CHANGE UP (ref 3.8–10.5)

## 2019-03-05 PROCEDURE — 99232 SBSQ HOSP IP/OBS MODERATE 35: CPT

## 2019-03-05 PROCEDURE — 99233 SBSQ HOSP IP/OBS HIGH 50: CPT | Mod: GC

## 2019-03-05 RX ADMIN — Medication 20 MILLIGRAM(S): at 22:12

## 2019-03-05 RX ADMIN — Medication 650 MILLIGRAM(S): at 04:45

## 2019-03-05 RX ADMIN — HEPARIN SODIUM 7500 UNIT(S): 5000 INJECTION INTRAVENOUS; SUBCUTANEOUS at 22:18

## 2019-03-05 RX ADMIN — PANTOPRAZOLE SODIUM 40 MILLIGRAM(S): 20 TABLET, DELAYED RELEASE ORAL at 04:20

## 2019-03-05 RX ADMIN — Medication 1 APPLICATION(S): at 15:52

## 2019-03-05 RX ADMIN — Medication 650 MILLIGRAM(S): at 15:50

## 2019-03-05 RX ADMIN — OXYCODONE HYDROCHLORIDE 5 MILLIGRAM(S): 5 TABLET ORAL at 10:24

## 2019-03-05 RX ADMIN — FENTANYL CITRATE 1 PATCH: 50 INJECTION INTRAVENOUS at 22:00

## 2019-03-05 RX ADMIN — OXYCODONE HYDROCHLORIDE 5 MILLIGRAM(S): 5 TABLET ORAL at 04:17

## 2019-03-05 RX ADMIN — CHLORHEXIDINE GLUCONATE 1 APPLICATION(S): 213 SOLUTION TOPICAL at 09:55

## 2019-03-05 RX ADMIN — OXYCODONE HYDROCHLORIDE 5 MILLIGRAM(S): 5 TABLET ORAL at 09:54

## 2019-03-05 RX ADMIN — Medication 650 MILLIGRAM(S): at 04:18

## 2019-03-05 RX ADMIN — Medication 20 MILLIGRAM(S): at 12:13

## 2019-03-05 RX ADMIN — FENTANYL CITRATE 1 PATCH: 50 INJECTION INTRAVENOUS at 08:06

## 2019-03-05 RX ADMIN — Medication 1: at 12:13

## 2019-03-05 RX ADMIN — Medication 137 MICROGRAM(S): at 04:19

## 2019-03-05 RX ADMIN — Medication 10 MILLIGRAM(S): at 04:20

## 2019-03-05 RX ADMIN — INSULIN GLARGINE 20 UNIT(S): 100 INJECTION, SOLUTION SUBCUTANEOUS at 22:02

## 2019-03-05 RX ADMIN — OXYCODONE HYDROCHLORIDE 5 MILLIGRAM(S): 5 TABLET ORAL at 05:20

## 2019-03-05 RX ADMIN — Medication 1: at 08:07

## 2019-03-05 RX ADMIN — SIMVASTATIN 20 MILLIGRAM(S): 20 TABLET, FILM COATED ORAL at 22:13

## 2019-03-05 RX ADMIN — TRAMADOL HYDROCHLORIDE 50 MILLIGRAM(S): 50 TABLET ORAL at 18:12

## 2019-03-05 RX ADMIN — Medication 20 MILLIGRAM(S): at 04:19

## 2019-03-05 RX ADMIN — Medication 4 UNIT(S): at 12:13

## 2019-03-05 RX ADMIN — GABAPENTIN 300 MILLIGRAM(S): 400 CAPSULE ORAL at 09:55

## 2019-03-05 RX ADMIN — TRAMADOL HYDROCHLORIDE 50 MILLIGRAM(S): 50 TABLET ORAL at 17:42

## 2019-03-05 RX ADMIN — HEPARIN SODIUM 7500 UNIT(S): 5000 INJECTION INTRAVENOUS; SUBCUTANEOUS at 15:52

## 2019-03-05 RX ADMIN — Medication 4 UNIT(S): at 08:06

## 2019-03-05 RX ADMIN — GABAPENTIN 300 MILLIGRAM(S): 400 CAPSULE ORAL at 22:13

## 2019-03-05 NOTE — PROGRESS NOTE ADULT - SUBJECTIVE AND OBJECTIVE BOX
History: patient is currenly on high flow o2, eating soft mechanical.  did not eat breakfast but had juice.  Patient more lethargic so will go back on bipap.  Recieved humalog pre lunch and did not eat.  drank more juice    MEDICATIONS  (STANDING):  collagenase Ointment 1 Application(s) Topical daily  dextrose 5%. 1000 milliLiter(s) (50 mL/Hr) IV Continuous <Continuous>  dextrose 50% Injectable 12.5 Gram(s) IV Push once  dextrose 50% Injectable 25 Gram(s) IV Push once  dextrose 50% Injectable 25 Gram(s) IV Push once  fentaNYL   Patch  50 MICROgram(s)/Hr 1 Patch Transdermal every 72 hours  heparin  Injectable 7500 Unit(s) SubCutaneous every 8 hours  insulin glargine Injectable (LANTUS) 20 Unit(s) SubCutaneous at bedtime  insulin lispro (HumaLOG) corrective regimen sliding scale   SubCutaneous Before meals and at bedtime  insulin lispro Injectable (HumaLOG) 2 Unit(s) SubCutaneous three times a day before meals  levothyroxine 100 MICROGram(s) Oral daily  methylPREDNISolone sodium succinate Injectable 10 milliGRAM(s) IV Push daily  pantoprazole    Tablet 40 milliGRAM(s) Oral before breakfast  sildenafil (REVATIO) 20 milliGRAM(s) Oral three times a day  simvastatin 20 milliGRAM(s) Oral at bedtime  traMADol 50 milliGRAM(s) Oral two times a day      MEDICATIONS  (PRN):  acetaminophen    Suspension .. 650 milliGRAM(s) Oral every 6 hours PRN Temp greater or equal to 38C (100.4F), Mild Pain (1 - 3), Moderate Pain (4 - 6)  dextrose 40% Gel 15 Gram(s) Oral once PRN Blood Glucose LESS THAN 70 milliGRAM(s)/deciliter  glucagon  Injectable 1 milliGRAM(s) IntraMuscular once PRN Glucose LESS THAN 70 milligrams/deciliter  glucagon  Injectable 1 milliGRAM(s) IntraMuscular once PRN Glucose LESS THAN 70 milligrams/deciliter      Allergies    No Known Allergies    Intolerances    Seroquel (Other)        PHYSICAL EXAM:  Vital Signs Last 24 Hrs  T(C): 37.1 (05 Mar 2019 05:20), Max: 37.9 (05 Mar 2019 04:28)  T(F): 98.7 (05 Mar 2019 05:20), Max: 100.2 (05 Mar 2019 04:28)  HR: 122 (05 Mar 2019 10:44) (120 - 137)  BP: 116/66 (05 Mar 2019 04:28) (102/58 - 127/76)  BP(mean): --  RR: 20 (05 Mar 2019 10:44) (20 - 26)  SpO2: 98% (05 Mar 2019 10:44) (86% - 100%)  GENERAL: acutely ill, on high flow o2  GI: Soft, nontender, non distended  PSYCH: alert, making needs known        CAPILLARY BLOOD GLUCOSE  POCT Blood Glucose.: 175 mg/dL (05 Mar 2019 11:55)  POCT Blood Glucose.: 186 mg/dL (05 Mar 2019 07:57)  POCT Blood Glucose.: 121 mg/dL (04 Mar 2019 21:15)  POCT Blood Glucose.: 160 mg/dL (04 Mar 2019 16:56)    POCT Blood Glucose.: 233 mg/dL (28 Feb 2019 11:13)  POCT Blood Glucose.: 203 mg/dL (28 Feb 2019 08:33)  POCT Blood Glucose.: 192 mg/dL (28 Feb 2019 08:07)  POCT Blood Glucose.: 112 mg/dL (27 Feb 2019 21:55)  POCT Blood Glucose.: 113 mg/dL (27 Feb 2019 18:02)    POCT Blood Glucose.: 203 mg/dL (26 Feb 2019 11:31)  POCT Blood Glucose.: 265 mg/dL (26 Feb 2019 06:32)  POCT Blood Glucose.: 118 mg/dL (25 Feb 2019 23:08)  POCT Blood Glucose.: 207 mg/dL (25 Feb 2019 18:24)    POCT Blood Glucose.: 366 mg/dL (25 Feb 2019 12:21)  POCT Blood Glucose.: 191 mg/dL (25 Feb 2019 05:43)  POCT Blood Glucose.: 148 mg/dL (25 Feb 2019 00:18)  POCT Blood Glucose.: 168 mg/dL (24 Feb 2019 17:10)    POCT Blood Glucose.: 168 mg/dL (02-24-19 @ 17:10)  POCT Blood Glucose.: 308 mg/dL (02-24-19 @ 12:09)  POCT Blood Glucose.: 227 mg/dL (02-24-19 @ 06:06)  POCT Blood Glucose.: 221 mg/dL (02-24-19 @ 00:15)  POCT Blood Glucose.: 180 mg/dL (02-23-19 @ 17:56)  POCT Blood Glucose.: 313 mg/dL (02-23-19 @ 11:58)  POCT Blood Glucose.: 163 mg/dL (02-23-19 @ 06:09)  POCT Blood Glucose.: 137 mg/dL (02-23-19 @ 00:23)  POCT Blood Glucose.: 146 mg/dL (02-22-19 @ 17:42)  POCT Blood Glucose.: 321 mg/dL (02-22-19 @ 13:15)  POCT Blood Glucose.: 130 mg/dL (02-22-19 @ 05:44)  POCT Blood Glucose.: 112 mg/dL (02-21-19 @ 23:58)  POCT Blood Glucose.: 129 mg/dL (02-21-19 @ 17:57)      02-24    139  |  92<L>  |  52<H>  ----------------------------<  239<H>  3.9   |  26  |  1.24    EGFR if : 54  EGFR if non : 47    Ca    10.3      02-24  Mg     2.2     02-24  Phos  5.6     02-24    TPro  7.7  /  Alb  3.6  /  TBili  0.5  /  DBili  x   /  AST  31  /  ALT  27  /  AlkPhos  167<H>  02-24        Thyroid Function Tests:      Hemoglobin A1C, Whole Blood: 7.1 % <H> [4.0 - 5.6] (12-17-18 @ 03:50)  Hemoglobin A1C, Whole Blood: 7.3 % <H> [4.0 - 5.6] (12-16-18 @ 04:17)

## 2019-03-05 NOTE — PROGRESS NOTE ADULT - SUBJECTIVE AND OBJECTIVE BOX
CHIEF COMPLAINT:    Interval Events:    REVIEW OF SYSTEMS:  Constitutional:   Eyes:  ENT:  CV:  Resp:  GI:  :  MSK:  Integumentary:  Neurological:  Psychiatric:  Endocrine:  Hematologic/Lymphatic:  Allergic/Immunologic:  [ ] All other systems negative  [ ] Unable to assess ROS because ________    OBJECTIVE:  ICU Vital Signs Last 24 Hrs  T(C): 37.1 (05 Mar 2019 05:20), Max: 37.9 (05 Mar 2019 04:28)  T(F): 98.7 (05 Mar 2019 05:20), Max: 100.2 (05 Mar 2019 04:28)  HR: 128 (05 Mar 2019 06:50) (109 - 137)  BP: 116/66 (05 Mar 2019 04:28) (102/58 - 127/76)  BP(mean): --  ABP: --  ABP(mean): --  RR: 20 (05 Mar 2019 06:50) (20 - 26)  SpO2: 100% (05 Mar 2019 06:50) (86% - 100%)        CAPILLARY BLOOD GLUCOSE      POCT Blood Glucose.: 186 mg/dL (05 Mar 2019 07:57)      PHYSICAL EXAM:  General:   HEENT:   Lymph Nodes:  Neck:   Respiratory:   Cardiovascular:   Abdomen:   Extremities:   Skin:   Neurological:  Psychiatry:    HOSPITAL MEDICATIONS:  MEDICATIONS  (STANDING):  ascorbic acid Syrup 1000 milliGRAM(s) Oral once  chlorhexidine 4% Liquid 1 Application(s) Topical <User Schedule>  collagenase Ointment 1 Application(s) Topical daily  dextrose 5%. 1000 milliLiter(s) (50 mL/Hr) IV Continuous <Continuous>  dextrose 50% Injectable 12.5 Gram(s) IV Push once  dextrose 50% Injectable 25 Gram(s) IV Push once  dextrose 50% Injectable 25 Gram(s) IV Push once  fentaNYL   Patch  50 MICROgram(s)/Hr 1 Patch Transdermal every 72 hours  gabapentin 300 milliGRAM(s) Oral two times a day  heparin  Injectable 7500 Unit(s) SubCutaneous every 8 hours  insulin glargine Injectable (LANTUS) 20 Unit(s) SubCutaneous at bedtime  insulin lispro (HumaLOG) corrective regimen sliding scale   SubCutaneous three times a day before meals  insulin lispro Injectable (HumaLOG) 4 Unit(s) SubCutaneous three times a day before meals  levothyroxine 137 MICROGram(s) Oral <User Schedule>  methylPREDNISolone sodium succinate Injectable 10 milliGRAM(s) IV Push daily  pantoprazole    Tablet 40 milliGRAM(s) Oral before breakfast  sildenafil (REVATIO) 20 milliGRAM(s) Oral three times a day  simvastatin 20 milliGRAM(s) Oral at bedtime  traMADol 50 milliGRAM(s) Oral two times a day    MEDICATIONS  (PRN):  acetaminophen   Tablet .. 650 milliGRAM(s) Oral every 6 hours PRN Temp greater or equal to 38C (100.4F), Moderate Pain (4 - 6)  dextrose 40% Gel 15 Gram(s) Oral once PRN Blood Glucose LESS THAN 70 milliGRAM(s)/deciLiter  glucagon  Injectable 1 milliGRAM(s) IntraMuscular once PRN Glucose <70 milliGRAM(s)/deciLiter  oxyCODONE    IR 5 milliGRAM(s) Oral every 4 hours PRN moderate and severe pain      LABS:                        10.3   10.36 )-----------( 421      ( 05 Mar 2019 04:30 )             37.0     03-05    131<L>  |  93<L>  |  15  ----------------------------<  157<H>  4.1   |  19<L>  |  0.80    Ca    9.4      05 Mar 2019 04:30    TPro  7.5  /  Alb  3.1<L>  /  TBili  0.4  /  DBili  x   /  AST  35<H>  /  ALT  18  /  AlkPhos  115  03-05              MICROBIOLOGY:     RADIOLOGY:  [ ] Reviewed and interpreted by me    PULMONARY FUNCTION TESTS:    EKG: CHIEF COMPLAINT: Patient is a 63y old  Female who presents with a chief complaint of sbo (04 Mar 2019 18:00)      Interval Events: None     REVIEW OF SYSTEMS:  Constitutional: Np fever or chills /tired   CV: Denies   Resp: Denies   GI: Denies   MSK: Weakness   [x ] All other systems negative  [ ] Unable to assess ROS because ________    OBJECTIVE:  ICU Vital Signs Last 24 Hrs  T(C): 37.1 (05 Mar 2019 05:20), Max: 37.9 (05 Mar 2019 04:28)  T(F): 98.7 (05 Mar 2019 05:20), Max: 100.2 (05 Mar 2019 04:28)  HR: 128 (05 Mar 2019 06:50) (109 - 137)  BP: 116/66 (05 Mar 2019 04:28) (102/58 - 127/76)  BP(mean): --  ABP: --  ABP(mean): --  RR: 20 (05 Mar 2019 06:50) (20 - 26)  SpO2: 100% (05 Mar 2019 06:50) (86% - 100%)        CAPILLARY BLOOD GLUCOSE      POCT Blood Glucose.: 186 mg/dL (05 Mar 2019 07:57)      PHYSICAL EXAM:  General:   HEENT:   Lymph Nodes:  Neck:   Respiratory:   Cardiovascular:   Abdomen:   Extremities:   Skin:   Neurological:  Psychiatry:    HOSPITAL MEDICATIONS:  MEDICATIONS  (STANDING):  ascorbic acid Syrup 1000 milliGRAM(s) Oral once  chlorhexidine 4% Liquid 1 Application(s) Topical <User Schedule>  collagenase Ointment 1 Application(s) Topical daily  dextrose 5%. 1000 milliLiter(s) (50 mL/Hr) IV Continuous <Continuous>  dextrose 50% Injectable 12.5 Gram(s) IV Push once  dextrose 50% Injectable 25 Gram(s) IV Push once  dextrose 50% Injectable 25 Gram(s) IV Push once  fentaNYL   Patch  50 MICROgram(s)/Hr 1 Patch Transdermal every 72 hours  gabapentin 300 milliGRAM(s) Oral two times a day  heparin  Injectable 7500 Unit(s) SubCutaneous every 8 hours  insulin glargine Injectable (LANTUS) 20 Unit(s) SubCutaneous at bedtime  insulin lispro (HumaLOG) corrective regimen sliding scale   SubCutaneous three times a day before meals  insulin lispro Injectable (HumaLOG) 4 Unit(s) SubCutaneous three times a day before meals  levothyroxine 137 MICROGram(s) Oral <User Schedule>  methylPREDNISolone sodium succinate Injectable 10 milliGRAM(s) IV Push daily  pantoprazole    Tablet 40 milliGRAM(s) Oral before breakfast  sildenafil (REVATIO) 20 milliGRAM(s) Oral three times a day  simvastatin 20 milliGRAM(s) Oral at bedtime  traMADol 50 milliGRAM(s) Oral two times a day    MEDICATIONS  (PRN):  acetaminophen   Tablet .. 650 milliGRAM(s) Oral every 6 hours PRN Temp greater or equal to 38C (100.4F), Moderate Pain (4 - 6)  dextrose 40% Gel 15 Gram(s) Oral once PRN Blood Glucose LESS THAN 70 milliGRAM(s)/deciLiter  glucagon  Injectable 1 milliGRAM(s) IntraMuscular once PRN Glucose <70 milliGRAM(s)/deciLiter  oxyCODONE    IR 5 milliGRAM(s) Oral every 4 hours PRN moderate and severe pain      LABS:                        10.3   10.36 )-----------( 421      ( 05 Mar 2019 04:30 )             37.0     03-05    131<L>  |  93<L>  |  15  ----------------------------<  157<H>  4.1   |  19<L>  |  0.80    Ca    9.4      05 Mar 2019 04:30    TPro  7.5  /  Alb  3.1<L>  /  TBili  0.4  /  DBili  x   /  AST  35<H>  /  ALT  18  /  AlkPhos  115  03-05              MICROBIOLOGY:     RADIOLOGY:  [ ] Reviewed and interpreted by me    PULMONARY FUNCTION TESTS:    EKG:

## 2019-03-05 NOTE — PROGRESS NOTE ADULT - PROBLEM SELECTOR PLAN 4
- temp 100.6 on 3/2/19   - repeat blood cultures sent 3/2: NTD  - Trend fever curve, May resume abx if spikes again (ertapenem) - temp 100.6 on 3/2/19   - repeat blood cultures sent 3/2: NTD  - Trend fever curve, May resume abx if spikes again (ertapenem)  - Fever this afternoon cx sent, blood sputum and UA

## 2019-03-05 NOTE — PROGRESS NOTE ADULT - PROBLEM SELECTOR PLAN 1
BG elevated above goal of 140-180 mg/dl.-   c/w lantus to 20 units s qqhs  c/w humalog 4 units sq tid ac  and continue low dose humalog correctional scale q6h.  will trend and f/u

## 2019-03-05 NOTE — PROGRESS NOTE ADULT - ATTENDING COMMENTS
Remains on HFNC and bilevel.  Wean O2 as tolerated.  Continue diuretics  Retention sutures removed.  Will follow up with patient regarding GOC discussion.

## 2019-03-05 NOTE — PROGRESS NOTE ADULT - PROBLEM SELECTOR PLAN 1
- prolonged intubation, now extubated to high flow/BIPAP  - BIPAP HS and PRN  - high flow, wean as tolerated  - cont sildenafil  - cont steroids  - chest PT  - lasix x 1 - prolonged intubation, now extubated to high flow/BIPAP  - BIPAP HS and PRN  - high flow, wean as tolerated  - cont sildenafil  - cont steroids  - chest PT

## 2019-03-05 NOTE — PROGRESS NOTE ADULT - PROBLEM SELECTOR PLAN 7
- SBO s/p ex-lap with findings of healthy bowel c/b evisceration of bowel requiring placement of retention sutures  - sx saw pt today and evaluated wound dw attending re: removal of retention sutures   - cont dressing to wound - SBO s/p ex-lap with findings of healthy bowel c/b evisceration of bowel requiring placement of retention sutures  - sx saw pt today and evaluated wound - retention sutures removed   - cont dressing to wound

## 2019-03-05 NOTE — CHART NOTE - NSCHARTNOTEFT_GEN_A_CORE
Source: Patient , nursing and EMR     Diet : Mechanical Soft - Consistent Carbohydrate  ( Evening Snack ) Supplement - Glucerna Shake 2/d     Patient on high flow daytime and BiPAP @ NIGHT , noted the GOC being addressed with pt. , son, daughter and will make final decision later today if PEG / TRACH decided . Per nursing pt. eats when she is not SOB and taking < 50% of the meals . Pt. continues with abdominal wound and noted 1+ generalized edema . Pt. was on EN when intubated .     Current Weight: - 99.5 kg on 3/4/19; 97.4 kg on 3/1/19; 98.9 kg on 2/26/19; 106.4 kg on 2/15/19; Admit wt. - 112.2 kg ; IBW - 47.7 kg     Pertinent Medications: MEDICATIONS  (STANDING):  ascorbic acid Syrup 1000 milliGRAM(s) Oral once  chlorhexidine 4% Liquid 1 Application(s) Topical <User Schedule>  collagenase Ointment 1 Application(s) Topical daily  dextrose 5%. 1000 milliLiter(s) (50 mL/Hr) IV Continuous <Continuous>  dextrose 50% Injectable 12.5 Gram(s) IV Push once  dextrose 50% Injectable 25 Gram(s) IV Push once  dextrose 50% Injectable 25 Gram(s) IV Push once  fentaNYL   Patch  50 MICROgram(s)/Hr 1 Patch Transdermal every 72 hours  gabapentin 300 milliGRAM(s) Oral two times a day  heparin  Injectable 7500 Unit(s) SubCutaneous every 8 hours  insulin glargine Injectable (LANTUS) 20 Unit(s) SubCutaneous at bedtime  insulin lispro (HumaLOG) corrective regimen sliding scale   SubCutaneous three times a day before meals  insulin lispro Injectable (HumaLOG) 4 Unit(s) SubCutaneous three times a day before meals  levothyroxine 137 MICROGram(s) Oral <User Schedule>  methylPREDNISolone sodium succinate Injectable 10 milliGRAM(s) IV Push daily  pantoprazole    Tablet 40 milliGRAM(s) Oral before breakfast  sildenafil (REVATIO) 20 milliGRAM(s) Oral three times a day  simvastatin 20 milliGRAM(s) Oral at bedtime  traMADol 50 milliGRAM(s) Oral two times a day    MEDICATIONS  (PRN):  acetaminophen   Tablet .. 650 milliGRAM(s) Oral every 6 hours PRN Temp greater or equal to 38C (100.4F), Moderate Pain (4 - 6)  dextrose 40% Gel 15 Gram(s) Oral once PRN Blood Glucose LESS THAN 70 milliGRAM(s)/deciLiter  glucagon  Injectable 1 milliGRAM(s) IntraMuscular once PRN Glucose <70 milliGRAM(s)/deciLiter  oxyCODONE    IR 5 milliGRAM(s) Oral every 4 hours PRN moderate and severe pain    Pertinent Labs:  03-05 Na131 mmol/L<L> Glu 157 mg/dL<H> K+ 4.1 mmol/L Cr  0.80 mg/dL BUN 15 mg/dL 02-28 Phos 2.3 mg/dL<L> 03-05 Alb 3.1 g/dL<L>      Recommend nutrition support consistent with GOC .     Monitoring and Evaluation:  PO intake , Tolerance to diet prescription , weights and follow up per protocol

## 2019-03-06 LAB
ALBUMIN SERPL ELPH-MCNC: 2.9 G/DL — LOW (ref 3.3–5)
ALP SERPL-CCNC: 107 U/L — SIGNIFICANT CHANGE UP (ref 40–120)
ALT FLD-CCNC: 15 U/L — SIGNIFICANT CHANGE UP (ref 4–33)
ANION GAP SERPL CALC-SCNC: 26 MMO/L — HIGH (ref 7–14)
APPEARANCE UR: SIGNIFICANT CHANGE UP
AST SERPL-CCNC: 25 U/L — SIGNIFICANT CHANGE UP (ref 4–32)
BACTERIA # UR AUTO: NEGATIVE — SIGNIFICANT CHANGE UP
BASOPHILS # BLD AUTO: 0.1 K/UL — SIGNIFICANT CHANGE UP (ref 0–0.2)
BASOPHILS NFR BLD AUTO: 1.1 % — SIGNIFICANT CHANGE UP (ref 0–2)
BILIRUB SERPL-MCNC: 0.4 MG/DL — SIGNIFICANT CHANGE UP (ref 0.2–1.2)
BILIRUB UR-MCNC: NEGATIVE — SIGNIFICANT CHANGE UP
BLOOD UR QL VISUAL: NEGATIVE — SIGNIFICANT CHANGE UP
BUN SERPL-MCNC: 19 MG/DL — SIGNIFICANT CHANGE UP (ref 7–23)
CALCIUM SERPL-MCNC: 9.3 MG/DL — SIGNIFICANT CHANGE UP (ref 8.4–10.5)
CHLORIDE SERPL-SCNC: 92 MMOL/L — LOW (ref 98–107)
CO2 SERPL-SCNC: 15 MMOL/L — LOW (ref 22–31)
COLOR SPEC: YELLOW — SIGNIFICANT CHANGE UP
CREAT SERPL-MCNC: 1.03 MG/DL — SIGNIFICANT CHANGE UP (ref 0.5–1.3)
EOSINOPHIL # BLD AUTO: 0.57 K/UL — HIGH (ref 0–0.5)
EOSINOPHIL NFR BLD AUTO: 6 % — SIGNIFICANT CHANGE UP (ref 0–6)
GLUCOSE BLDC GLUCOMTR-MCNC: 183 MG/DL — HIGH (ref 70–99)
GLUCOSE BLDC GLUCOMTR-MCNC: 325 MG/DL — HIGH (ref 70–99)
GLUCOSE BLDC GLUCOMTR-MCNC: 372 MG/DL — HIGH (ref 70–99)
GLUCOSE BLDC GLUCOMTR-MCNC: 385 MG/DL — HIGH (ref 70–99)
GLUCOSE BLDC GLUCOMTR-MCNC: 397 MG/DL — HIGH (ref 70–99)
GLUCOSE SERPL-MCNC: 162 MG/DL — HIGH (ref 70–99)
GLUCOSE UR-MCNC: 150 — HIGH
HCT VFR BLD CALC: 33.1 % — LOW (ref 34.5–45)
HGB BLD-MCNC: 9.5 G/DL — LOW (ref 11.5–15.5)
HYALINE CASTS # UR AUTO: HIGH
IMM GRANULOCYTES NFR BLD AUTO: 3.6 % — HIGH (ref 0–1.5)
KETONES UR-MCNC: SIGNIFICANT CHANGE UP
LEUKOCYTE ESTERASE UR-ACNC: SIGNIFICANT CHANGE UP
LYMPHOCYTES # BLD AUTO: 2.86 K/UL — SIGNIFICANT CHANGE UP (ref 1–3.3)
LYMPHOCYTES # BLD AUTO: 30.2 % — SIGNIFICANT CHANGE UP (ref 13–44)
MCHC RBC-ENTMCNC: 28.5 PG — SIGNIFICANT CHANGE UP (ref 27–34)
MCHC RBC-ENTMCNC: 28.7 % — LOW (ref 32–36)
MCV RBC AUTO: 99.4 FL — SIGNIFICANT CHANGE UP (ref 80–100)
MONOCYTES # BLD AUTO: 1.12 K/UL — HIGH (ref 0–0.9)
MONOCYTES NFR BLD AUTO: 11.8 % — SIGNIFICANT CHANGE UP (ref 2–14)
NEUTROPHILS # BLD AUTO: 4.47 K/UL — SIGNIFICANT CHANGE UP (ref 1.8–7.4)
NEUTROPHILS NFR BLD AUTO: 47.3 % — SIGNIFICANT CHANGE UP (ref 43–77)
NITRITE UR-MCNC: NEGATIVE — SIGNIFICANT CHANGE UP
NRBC # FLD: 0.08 K/UL — LOW (ref 25–125)
PH UR: 5.5 — SIGNIFICANT CHANGE UP (ref 5–8)
PLATELET # BLD AUTO: 445 K/UL — HIGH (ref 150–400)
PMV BLD: 10.4 FL — SIGNIFICANT CHANGE UP (ref 7–13)
POTASSIUM SERPL-MCNC: 4.6 MMOL/L — SIGNIFICANT CHANGE UP (ref 3.5–5.3)
POTASSIUM SERPL-SCNC: 4.6 MMOL/L — SIGNIFICANT CHANGE UP (ref 3.5–5.3)
PROT SERPL-MCNC: 7 G/DL — SIGNIFICANT CHANGE UP (ref 6–8.3)
PROT UR-MCNC: 70 — SIGNIFICANT CHANGE UP
RBC # BLD: 3.33 M/UL — LOW (ref 3.8–5.2)
RBC # FLD: 17.2 % — HIGH (ref 10.3–14.5)
RBC CASTS # UR COMP ASSIST: HIGH (ref 0–?)
SODIUM SERPL-SCNC: 133 MMOL/L — LOW (ref 135–145)
SP GR SPEC: 1.02 — SIGNIFICANT CHANGE UP (ref 1–1.04)
SPECIMEN SOURCE: SIGNIFICANT CHANGE UP
SQUAMOUS # UR AUTO: SIGNIFICANT CHANGE UP
UROBILINOGEN FLD QL: NORMAL — SIGNIFICANT CHANGE UP
WBC # BLD: 9.46 K/UL — SIGNIFICANT CHANGE UP (ref 3.8–10.5)
WBC # FLD AUTO: 9.46 K/UL — SIGNIFICANT CHANGE UP (ref 3.8–10.5)
WBC UR QL: HIGH (ref 0–?)

## 2019-03-06 PROCEDURE — 99233 SBSQ HOSP IP/OBS HIGH 50: CPT | Mod: GC

## 2019-03-06 PROCEDURE — 71045 X-RAY EXAM CHEST 1 VIEW: CPT | Mod: 26

## 2019-03-06 RX ORDER — FENTANYL CITRATE 50 UG/ML
1 INJECTION INTRAVENOUS
Qty: 0 | Refills: 0 | Status: DISCONTINUED | OUTPATIENT
Start: 2019-03-06 | End: 2019-03-07

## 2019-03-06 RX ADMIN — FENTANYL CITRATE 1 PATCH: 50 INJECTION INTRAVENOUS at 07:33

## 2019-03-06 RX ADMIN — GABAPENTIN 300 MILLIGRAM(S): 400 CAPSULE ORAL at 22:10

## 2019-03-06 RX ADMIN — SIMVASTATIN 20 MILLIGRAM(S): 20 TABLET, FILM COATED ORAL at 22:10

## 2019-03-06 RX ADMIN — TRAMADOL HYDROCHLORIDE 50 MILLIGRAM(S): 50 TABLET ORAL at 07:53

## 2019-03-06 RX ADMIN — HEPARIN SODIUM 7500 UNIT(S): 5000 INJECTION INTRAVENOUS; SUBCUTANEOUS at 07:35

## 2019-03-06 RX ADMIN — TRAMADOL HYDROCHLORIDE 50 MILLIGRAM(S): 50 TABLET ORAL at 17:20

## 2019-03-06 RX ADMIN — Medication 650 MILLIGRAM(S): at 17:32

## 2019-03-06 RX ADMIN — Medication 137 MICROGRAM(S): at 07:36

## 2019-03-06 RX ADMIN — TRAMADOL HYDROCHLORIDE 50 MILLIGRAM(S): 50 TABLET ORAL at 17:50

## 2019-03-06 RX ADMIN — Medication 4 UNIT(S): at 08:58

## 2019-03-06 RX ADMIN — OXYCODONE HYDROCHLORIDE 5 MILLIGRAM(S): 5 TABLET ORAL at 12:19

## 2019-03-06 RX ADMIN — FENTANYL CITRATE 1 PATCH: 50 INJECTION INTRAVENOUS at 12:05

## 2019-03-06 RX ADMIN — TRAMADOL HYDROCHLORIDE 50 MILLIGRAM(S): 50 TABLET ORAL at 08:23

## 2019-03-06 RX ADMIN — Medication 20 MILLIGRAM(S): at 13:47

## 2019-03-06 RX ADMIN — Medication 4 UNIT(S): at 17:20

## 2019-03-06 RX ADMIN — Medication 4 UNIT(S): at 12:05

## 2019-03-06 RX ADMIN — Medication 20 MILLIGRAM(S): at 07:52

## 2019-03-06 RX ADMIN — Medication 10 MILLIGRAM(S): at 07:36

## 2019-03-06 RX ADMIN — Medication 1 APPLICATION(S): at 11:53

## 2019-03-06 RX ADMIN — OXYCODONE HYDROCHLORIDE 5 MILLIGRAM(S): 5 TABLET ORAL at 22:08

## 2019-03-06 RX ADMIN — GABAPENTIN 300 MILLIGRAM(S): 400 CAPSULE ORAL at 11:52

## 2019-03-06 RX ADMIN — HEPARIN SODIUM 7500 UNIT(S): 5000 INJECTION INTRAVENOUS; SUBCUTANEOUS at 13:47

## 2019-03-06 RX ADMIN — CHLORHEXIDINE GLUCONATE 1 APPLICATION(S): 213 SOLUTION TOPICAL at 11:52

## 2019-03-06 RX ADMIN — Medication 4: at 12:05

## 2019-03-06 RX ADMIN — Medication 1: at 08:58

## 2019-03-06 RX ADMIN — INSULIN GLARGINE 20 UNIT(S): 100 INJECTION, SOLUTION SUBCUTANEOUS at 22:08

## 2019-03-06 RX ADMIN — FENTANYL CITRATE 1 PATCH: 50 INJECTION INTRAVENOUS at 19:00

## 2019-03-06 RX ADMIN — Medication 20 MILLIGRAM(S): at 22:09

## 2019-03-06 RX ADMIN — Medication 5: at 17:20

## 2019-03-06 RX ADMIN — PANTOPRAZOLE SODIUM 40 MILLIGRAM(S): 20 TABLET, DELAYED RELEASE ORAL at 07:36

## 2019-03-06 RX ADMIN — OXYCODONE HYDROCHLORIDE 5 MILLIGRAM(S): 5 TABLET ORAL at 11:49

## 2019-03-06 RX ADMIN — FENTANYL CITRATE 1 PATCH: 50 INJECTION INTRAVENOUS at 12:06

## 2019-03-06 NOTE — PROGRESS NOTE ADULT - SUBJECTIVE AND OBJECTIVE BOX
CHIEF COMPLAINT:    Interval Events:    REVIEW OF SYSTEMS:  Constitutional:   Eyes:  ENT:  CV:  Resp:  GI:  :  MSK:  Integumentary:  Neurological:  Psychiatric:  Endocrine:  Hematologic/Lymphatic:  Allergic/Immunologic:  [ ] All other systems negative  [ ] Unable to assess ROS because ________    OBJECTIVE:  ICU Vital Signs Last 24 Hrs  T(C): 37.5 (06 Mar 2019 06:00), Max: 38.2 (05 Mar 2019 15:00)  T(F): 99.5 (06 Mar 2019 06:00), Max: 100.8 (05 Mar 2019 15:00)  HR: 130 (06 Mar 2019 07:51) (102 - 133)  BP: 113/64 (06 Mar 2019 06:00) (101/76 - 133/77)  BP(mean): --  ABP: --  ABP(mean): --  RR: 20 (06 Mar 2019 06:00) (20 - 20)  SpO2: 97% (06 Mar 2019 07:51) (96% - 99%)        03-05 @ 07:01  -  03-06 @ 07:00  --------------------------------------------------------  IN: 0 mL / OUT: 500 mL / NET: -500 mL      CAPILLARY BLOOD GLUCOSE      POCT Blood Glucose.: 183 mg/dL (06 Mar 2019 07:51)      PHYSICAL EXAM:  General:   HEENT:   Lymph Nodes:  Neck:   Respiratory:   Cardiovascular:   Abdomen:   Extremities:   Skin:   Neurological:  Psychiatry:    HOSPITAL MEDICATIONS:  MEDICATIONS  (STANDING):  ascorbic acid Syrup 1000 milliGRAM(s) Oral once  chlorhexidine 4% Liquid 1 Application(s) Topical <User Schedule>  collagenase Ointment 1 Application(s) Topical daily  dextrose 5%. 1000 milliLiter(s) (50 mL/Hr) IV Continuous <Continuous>  dextrose 50% Injectable 12.5 Gram(s) IV Push once  dextrose 50% Injectable 25 Gram(s) IV Push once  dextrose 50% Injectable 25 Gram(s) IV Push once  fentaNYL   Patch  50 MICROgram(s)/Hr 1 Patch Transdermal every 72 hours  gabapentin 300 milliGRAM(s) Oral two times a day  heparin  Injectable 7500 Unit(s) SubCutaneous every 8 hours  insulin glargine Injectable (LANTUS) 20 Unit(s) SubCutaneous at bedtime  insulin lispro (HumaLOG) corrective regimen sliding scale   SubCutaneous three times a day before meals  insulin lispro Injectable (HumaLOG) 4 Unit(s) SubCutaneous three times a day before meals  levothyroxine 137 MICROGram(s) Oral <User Schedule>  methylPREDNISolone sodium succinate Injectable 10 milliGRAM(s) IV Push daily  pantoprazole    Tablet 40 milliGRAM(s) Oral before breakfast  sildenafil (REVATIO) 20 milliGRAM(s) Oral three times a day  simvastatin 20 milliGRAM(s) Oral at bedtime  traMADol 50 milliGRAM(s) Oral two times a day    MEDICATIONS  (PRN):  acetaminophen   Tablet .. 650 milliGRAM(s) Oral every 6 hours PRN Temp greater or equal to 38C (100.4F), Moderate Pain (4 - 6)  dextrose 40% Gel 15 Gram(s) Oral once PRN Blood Glucose LESS THAN 70 milliGRAM(s)/deciLiter  glucagon  Injectable 1 milliGRAM(s) IntraMuscular once PRN Glucose <70 milliGRAM(s)/deciLiter  oxyCODONE    IR 5 milliGRAM(s) Oral every 4 hours PRN moderate and severe pain      LABS:                        9.5    9.46  )-----------( 445      ( 06 Mar 2019 06:10 )             33.1     03-06    133<L>  |  92<L>  |  19  ----------------------------<  162<H>  4.6   |  15<L>  |  1.03    Ca    9.3      06 Mar 2019 06:10    TPro  7.0  /  Alb  2.9<L>  /  TBili  0.4  /  DBili  x   /  AST  25  /  ALT  15  /  AlkPhos  107  03-06              MICROBIOLOGY:     RADIOLOGY:  [ ] Reviewed and interpreted by me    PULMONARY FUNCTION TESTS:    EKG: CHIEF COMPLAINT: Patient is a 63y old  Female who presents with a chief complaint of Patient is a 63y old  Female who presents with a chief complaint of sbo (04 Mar 2019 18:00) (05 Mar 2019 08:39)      Interval Events: none overnight     REVIEW OF SYSTEMS:  Constitutional: no fever or chills   CV: denies   Resp: + COREAS   GI: Denies   MSK: Weakness   [x ] All other systems negative      OBJECTIVE:  ICU Vital Signs Last 24 Hrs  T(C): 37.5 (06 Mar 2019 06:00), Max: 38.2 (05 Mar 2019 15:00)  T(F): 99.5 (06 Mar 2019 06:00), Max: 100.8 (05 Mar 2019 15:00)  HR: 130 (06 Mar 2019 07:51) (102 - 133)  BP: 113/64 (06 Mar 2019 06:00) (101/76 - 133/77)  BP(mean): --  ABP: --  ABP(mean): --  RR: 20 (06 Mar 2019 06:00) (20 - 20)  SpO2: 97% (06 Mar 2019 07:51) (96% - 99%)        03-05 @ 07:01  -  03-06 @ 07:00  --------------------------------------------------------  IN: 0 mL / OUT: 500 mL / NET: -500 mL      CAPILLARY BLOOD GLUCOSE      POCT Blood Glucose.: 183 mg/dL (06 Mar 2019 07:51)      PHYSICAL EXAM:  General:   HEENT:   Lymph Nodes:  Neck:   Respiratory:   Cardiovascular:   Abdomen:   Extremities:   Skin:   Neurological:  Psychiatry:    HOSPITAL MEDICATIONS:  MEDICATIONS  (STANDING):  ascorbic acid Syrup 1000 milliGRAM(s) Oral once  chlorhexidine 4% Liquid 1 Application(s) Topical <User Schedule>  collagenase Ointment 1 Application(s) Topical daily  dextrose 5%. 1000 milliLiter(s) (50 mL/Hr) IV Continuous <Continuous>  dextrose 50% Injectable 12.5 Gram(s) IV Push once  dextrose 50% Injectable 25 Gram(s) IV Push once  dextrose 50% Injectable 25 Gram(s) IV Push once  fentaNYL   Patch  50 MICROgram(s)/Hr 1 Patch Transdermal every 72 hours  gabapentin 300 milliGRAM(s) Oral two times a day  heparin  Injectable 7500 Unit(s) SubCutaneous every 8 hours  insulin glargine Injectable (LANTUS) 20 Unit(s) SubCutaneous at bedtime  insulin lispro (HumaLOG) corrective regimen sliding scale   SubCutaneous three times a day before meals  insulin lispro Injectable (HumaLOG) 4 Unit(s) SubCutaneous three times a day before meals  levothyroxine 137 MICROGram(s) Oral <User Schedule>  methylPREDNISolone sodium succinate Injectable 10 milliGRAM(s) IV Push daily  pantoprazole    Tablet 40 milliGRAM(s) Oral before breakfast  sildenafil (REVATIO) 20 milliGRAM(s) Oral three times a day  simvastatin 20 milliGRAM(s) Oral at bedtime  traMADol 50 milliGRAM(s) Oral two times a day    MEDICATIONS  (PRN):  acetaminophen   Tablet .. 650 milliGRAM(s) Oral every 6 hours PRN Temp greater or equal to 38C (100.4F), Moderate Pain (4 - 6)  dextrose 40% Gel 15 Gram(s) Oral once PRN Blood Glucose LESS THAN 70 milliGRAM(s)/deciLiter  glucagon  Injectable 1 milliGRAM(s) IntraMuscular once PRN Glucose <70 milliGRAM(s)/deciLiter  oxyCODONE    IR 5 milliGRAM(s) Oral every 4 hours PRN moderate and severe pain      LABS:                        9.5    9.46  )-----------( 445      ( 06 Mar 2019 06:10 )             33.1     03-06    133<L>  |  92<L>  |  19  ----------------------------<  162<H>  4.6   |  15<L>  |  1.03    Ca    9.3      06 Mar 2019 06:10    TPro  7.0  /  Alb  2.9<L>  /  TBili  0.4  /  DBili  x   /  AST  25  /  ALT  15  /  AlkPhos  107  03-06              MICROBIOLOGY:     RADIOLOGY:  [ ] Reviewed and interpreted by me    PULMONARY FUNCTION TESTS:    EKG:

## 2019-03-06 NOTE — PROGRESS NOTE ADULT - ATTENDING COMMENTS
Remains on HFNC and bilevel. Wean O2 as tolerated.  Continue diuretics.  Daughter to arrive today and is now HCP.   Will follow up with patient regarding GOC discussion after daughter arrives.

## 2019-03-06 NOTE — PROGRESS NOTE ADULT - PROBLEM SELECTOR PLAN 2
- had 27 days of Vanc, 3 days of Ceftriaxone, and 8 days of Zosyn for enterococcal bacteremia  - cultures cleared  - cont to monitor - had 27 days of Vanc, 3 days of Ceftriaxone, and 8 days of Zosyn for enterococcal bacteremia  - cultures cleared  - cont to monitor/ follow temps

## 2019-03-06 NOTE — PROGRESS NOTE ADULT - PROBLEM SELECTOR PLAN 9
- family meeting scheduled for 2pm on monday 3/4/19 - family meeting held - Pt to make decision regarding trach/feeding tube when dtr arrives and will let us know her wishes

## 2019-03-06 NOTE — PROGRESS NOTE ADULT - PROBLEM SELECTOR PLAN 4
- temp 100.6 on 3/2/19   - repeat blood cultures sent 3/2: NTD  - Trend fever curve, May resume abx if spikes again (ertapenem)  - Fever this afternoon cx sent, blood sputum and UA

## 2019-03-06 NOTE — PROGRESS NOTE ADULT - PROBLEM SELECTOR PLAN 7
- SBO s/p ex-lap with findings of healthy bowel c/b evisceration of bowel requiring placement of retention sutures  - sx saw pt today and evaluated wound - retention sutures removed   - cont dressing to wound - SBO s/p ex-lap with findings of healthy bowel c/b evisceration of bowel requiring placement of retention sutures  - sx saw pt today and evaluated wound - retention sutures removed   - cont dressing to wound change 2x day

## 2019-03-07 LAB
ALBUMIN SERPL ELPH-MCNC: 2.9 G/DL — LOW (ref 3.3–5)
ALP SERPL-CCNC: 104 U/L — SIGNIFICANT CHANGE UP (ref 40–120)
ALT FLD-CCNC: 16 U/L — SIGNIFICANT CHANGE UP (ref 4–33)
ANION GAP SERPL CALC-SCNC: 15 MMO/L — HIGH (ref 7–14)
AST SERPL-CCNC: 16 U/L — SIGNIFICANT CHANGE UP (ref 4–32)
BACTERIA BLD CULT: SIGNIFICANT CHANGE UP
BACTERIA BLD CULT: SIGNIFICANT CHANGE UP
BASOPHILS # BLD AUTO: 0.05 K/UL — SIGNIFICANT CHANGE UP (ref 0–0.2)
BASOPHILS NFR BLD AUTO: 0.4 % — SIGNIFICANT CHANGE UP (ref 0–2)
BILIRUB SERPL-MCNC: 0.3 MG/DL — SIGNIFICANT CHANGE UP (ref 0.2–1.2)
BUN SERPL-MCNC: 22 MG/DL — SIGNIFICANT CHANGE UP (ref 7–23)
CALCIUM SERPL-MCNC: 9 MG/DL — SIGNIFICANT CHANGE UP (ref 8.4–10.5)
CHLORIDE SERPL-SCNC: 90 MMOL/L — LOW (ref 98–107)
CO2 SERPL-SCNC: 19 MMOL/L — LOW (ref 22–31)
CREAT SERPL-MCNC: 0.89 MG/DL — SIGNIFICANT CHANGE UP (ref 0.5–1.3)
EOSINOPHIL # BLD AUTO: 0.02 K/UL — SIGNIFICANT CHANGE UP (ref 0–0.5)
EOSINOPHIL NFR BLD AUTO: 0.2 % — SIGNIFICANT CHANGE UP (ref 0–6)
GLUCOSE BLDC GLUCOMTR-MCNC: 271 MG/DL — HIGH (ref 70–99)
GLUCOSE BLDC GLUCOMTR-MCNC: 314 MG/DL — HIGH (ref 70–99)
GLUCOSE BLDC GLUCOMTR-MCNC: 334 MG/DL — HIGH (ref 70–99)
GLUCOSE BLDC GLUCOMTR-MCNC: 337 MG/DL — HIGH (ref 70–99)
GLUCOSE SERPL-MCNC: 381 MG/DL — HIGH (ref 70–99)
HCT VFR BLD CALC: 30.3 % — LOW (ref 34.5–45)
HGB BLD-MCNC: 9.1 G/DL — LOW (ref 11.5–15.5)
IMM GRANULOCYTES NFR BLD AUTO: 3.4 % — HIGH (ref 0–1.5)
LYMPHOCYTES # BLD AUTO: 16.5 % — SIGNIFICANT CHANGE UP (ref 13–44)
LYMPHOCYTES # BLD AUTO: 2.01 K/UL — SIGNIFICANT CHANGE UP (ref 1–3.3)
MCHC RBC-ENTMCNC: 28.5 PG — SIGNIFICANT CHANGE UP (ref 27–34)
MCHC RBC-ENTMCNC: 30 % — LOW (ref 32–36)
MCV RBC AUTO: 95 FL — SIGNIFICANT CHANGE UP (ref 80–100)
MONOCYTES # BLD AUTO: 1.22 K/UL — HIGH (ref 0–0.9)
MONOCYTES NFR BLD AUTO: 10 % — SIGNIFICANT CHANGE UP (ref 2–14)
NEUTROPHILS # BLD AUTO: 8.46 K/UL — HIGH (ref 1.8–7.4)
NEUTROPHILS NFR BLD AUTO: 69.5 % — SIGNIFICANT CHANGE UP (ref 43–77)
NRBC # FLD: 0.04 K/UL — LOW (ref 25–125)
PLATELET # BLD AUTO: 376 K/UL — SIGNIFICANT CHANGE UP (ref 150–400)
PMV BLD: 9.9 FL — SIGNIFICANT CHANGE UP (ref 7–13)
POTASSIUM SERPL-MCNC: 5 MMOL/L — SIGNIFICANT CHANGE UP (ref 3.5–5.3)
POTASSIUM SERPL-SCNC: 5 MMOL/L — SIGNIFICANT CHANGE UP (ref 3.5–5.3)
PROT SERPL-MCNC: 6.9 G/DL — SIGNIFICANT CHANGE UP (ref 6–8.3)
RBC # BLD: 3.19 M/UL — LOW (ref 3.8–5.2)
RBC # FLD: 16.5 % — HIGH (ref 10.3–14.5)
SODIUM SERPL-SCNC: 124 MMOL/L — LOW (ref 135–145)
SPECIMEN SOURCE: SIGNIFICANT CHANGE UP
WBC # BLD: 12.18 K/UL — HIGH (ref 3.8–10.5)
WBC # FLD AUTO: 12.18 K/UL — HIGH (ref 3.8–10.5)

## 2019-03-07 PROCEDURE — 99232 SBSQ HOSP IP/OBS MODERATE 35: CPT

## 2019-03-07 PROCEDURE — 99233 SBSQ HOSP IP/OBS HIGH 50: CPT | Mod: GC

## 2019-03-07 RX ORDER — FUROSEMIDE 40 MG
40 TABLET ORAL DAILY
Qty: 0 | Refills: 0 | Status: DISCONTINUED | OUTPATIENT
Start: 2019-03-07 | End: 2019-03-09

## 2019-03-07 RX ORDER — INSULIN GLARGINE 100 [IU]/ML
23 INJECTION, SOLUTION SUBCUTANEOUS AT BEDTIME
Qty: 0 | Refills: 0 | Status: DISCONTINUED | OUTPATIENT
Start: 2019-03-07 | End: 2019-03-09

## 2019-03-07 RX ORDER — LACTOBACILLUS ACIDOPHILUS 100MM CELL
1 CAPSULE ORAL
Qty: 0 | Refills: 0 | Status: DISCONTINUED | OUTPATIENT
Start: 2019-03-07 | End: 2019-03-20

## 2019-03-07 RX ORDER — FENTANYL CITRATE 50 UG/ML
1 INJECTION INTRAVENOUS
Qty: 0 | Refills: 0 | Status: DISCONTINUED | OUTPATIENT
Start: 2019-03-07 | End: 2019-03-09

## 2019-03-07 RX ORDER — TRAMADOL HYDROCHLORIDE 50 MG/1
50 TABLET ORAL
Qty: 0 | Refills: 0 | Status: DISCONTINUED | OUTPATIENT
Start: 2019-03-07 | End: 2019-03-14

## 2019-03-07 RX ORDER — GABAPENTIN 400 MG/1
300 CAPSULE ORAL THREE TIMES A DAY
Qty: 0 | Refills: 0 | Status: DISCONTINUED | OUTPATIENT
Start: 2019-03-07 | End: 2019-03-20

## 2019-03-07 RX ORDER — PSYLLIUM SEED (WITH DEXTROSE)
1 POWDER (GRAM) ORAL DAILY
Qty: 0 | Refills: 0 | Status: DISCONTINUED | OUTPATIENT
Start: 2019-03-07 | End: 2019-03-20

## 2019-03-07 RX ORDER — INSULIN LISPRO 100/ML
5 VIAL (ML) SUBCUTANEOUS
Qty: 0 | Refills: 0 | Status: DISCONTINUED | OUTPATIENT
Start: 2019-03-07 | End: 2019-03-08

## 2019-03-07 RX ORDER — OXYCODONE HYDROCHLORIDE 5 MG/1
5 TABLET ORAL EVERY 4 HOURS
Qty: 0 | Refills: 0 | Status: DISCONTINUED | OUTPATIENT
Start: 2019-03-07 | End: 2019-03-14

## 2019-03-07 RX ADMIN — Medication 1 TABLET(S): at 17:17

## 2019-03-07 RX ADMIN — OXYCODONE HYDROCHLORIDE 5 MILLIGRAM(S): 5 TABLET ORAL at 10:00

## 2019-03-07 RX ADMIN — Medication 4: at 17:15

## 2019-03-07 RX ADMIN — Medication 5 UNIT(S): at 17:14

## 2019-03-07 RX ADMIN — Medication 4 UNIT(S): at 11:45

## 2019-03-07 RX ADMIN — Medication 137 MICROGRAM(S): at 06:21

## 2019-03-07 RX ADMIN — HEPARIN SODIUM 7500 UNIT(S): 5000 INJECTION INTRAVENOUS; SUBCUTANEOUS at 22:25

## 2019-03-07 RX ADMIN — Medication 4 UNIT(S): at 08:14

## 2019-03-07 RX ADMIN — Medication 4: at 08:14

## 2019-03-07 RX ADMIN — CHLORHEXIDINE GLUCONATE 1 APPLICATION(S): 213 SOLUTION TOPICAL at 09:23

## 2019-03-07 RX ADMIN — Medication 1 PACKET(S): at 12:34

## 2019-03-07 RX ADMIN — FENTANYL CITRATE 1 PATCH: 50 INJECTION INTRAVENOUS at 07:54

## 2019-03-07 RX ADMIN — GABAPENTIN 300 MILLIGRAM(S): 400 CAPSULE ORAL at 15:24

## 2019-03-07 RX ADMIN — Medication 1 APPLICATION(S): at 11:46

## 2019-03-07 RX ADMIN — TRAMADOL HYDROCHLORIDE 50 MILLIGRAM(S): 50 TABLET ORAL at 17:15

## 2019-03-07 RX ADMIN — Medication 4: at 11:45

## 2019-03-07 RX ADMIN — OXYCODONE HYDROCHLORIDE 5 MILLIGRAM(S): 5 TABLET ORAL at 21:20

## 2019-03-07 RX ADMIN — Medication 20 MILLIGRAM(S): at 21:21

## 2019-03-07 RX ADMIN — SIMVASTATIN 20 MILLIGRAM(S): 20 TABLET, FILM COATED ORAL at 21:21

## 2019-03-07 RX ADMIN — OXYCODONE HYDROCHLORIDE 5 MILLIGRAM(S): 5 TABLET ORAL at 22:10

## 2019-03-07 RX ADMIN — OXYCODONE HYDROCHLORIDE 5 MILLIGRAM(S): 5 TABLET ORAL at 09:23

## 2019-03-07 RX ADMIN — Medication 1 TABLET(S): at 11:49

## 2019-03-07 RX ADMIN — Medication 10 MILLIGRAM(S): at 06:20

## 2019-03-07 RX ADMIN — Medication 500 MILLIGRAM(S): at 11:44

## 2019-03-07 RX ADMIN — GABAPENTIN 300 MILLIGRAM(S): 400 CAPSULE ORAL at 21:21

## 2019-03-07 RX ADMIN — HEPARIN SODIUM 7500 UNIT(S): 5000 INJECTION INTRAVENOUS; SUBCUTANEOUS at 15:25

## 2019-03-07 RX ADMIN — TRAMADOL HYDROCHLORIDE 50 MILLIGRAM(S): 50 TABLET ORAL at 06:20

## 2019-03-07 RX ADMIN — Medication 20 MILLIGRAM(S): at 06:21

## 2019-03-07 RX ADMIN — HEPARIN SODIUM 7500 UNIT(S): 5000 INJECTION INTRAVENOUS; SUBCUTANEOUS at 06:20

## 2019-03-07 RX ADMIN — Medication 500 MILLIGRAM(S): at 23:20

## 2019-03-07 RX ADMIN — FENTANYL CITRATE 1 PATCH: 50 INJECTION INTRAVENOUS at 20:00

## 2019-03-07 RX ADMIN — Medication 500 MILLIGRAM(S): at 22:21

## 2019-03-07 RX ADMIN — PANTOPRAZOLE SODIUM 40 MILLIGRAM(S): 20 TABLET, DELAYED RELEASE ORAL at 06:21

## 2019-03-07 RX ADMIN — GABAPENTIN 300 MILLIGRAM(S): 400 CAPSULE ORAL at 09:23

## 2019-03-07 RX ADMIN — Medication 20 MILLIGRAM(S): at 15:25

## 2019-03-07 RX ADMIN — INSULIN GLARGINE 23 UNIT(S): 100 INJECTION, SOLUTION SUBCUTANEOUS at 22:32

## 2019-03-07 RX ADMIN — Medication 40 MILLIGRAM(S): at 11:44

## 2019-03-07 NOTE — CHART NOTE - NSCHARTNOTEFT_GEN_A_CORE
1500- spoke with Heavenly Hernándze (patient's appointed HCP) who is currently driving to [place] from [place] and will be in the hospital this evening.  Understands that patient wants her to be present in order to discuss advance directives and goals of care.

## 2019-03-07 NOTE — PROGRESS NOTE ADULT - SUBJECTIVE AND OBJECTIVE BOX
History: son at bedside, patient on bipap sleeping.  As per son, patient is eating more and has a better appetite.  No outside food brought in today but soup was brought in yesterday and she ate it    MEDICATIONS  (STANDING):  collagenase Ointment 1 Application(s) Topical daily  dextrose 5%. 1000 milliLiter(s) (50 mL/Hr) IV Continuous <Continuous>  dextrose 50% Injectable 12.5 Gram(s) IV Push once  dextrose 50% Injectable 25 Gram(s) IV Push once  dextrose 50% Injectable 25 Gram(s) IV Push once  fentaNYL   Patch  50 MICROgram(s)/Hr 1 Patch Transdermal every 72 hours  heparin  Injectable 7500 Unit(s) SubCutaneous every 8 hours  insulin glargine Injectable (LANTUS) 20 Unit(s) SubCutaneous at bedtime  insulin lispro (HumaLOG) corrective regimen sliding scale   SubCutaneous Before meals and at bedtime  insulin lispro Injectable (HumaLOG) 2 Unit(s) SubCutaneous three times a day before meals  levothyroxine 100 MICROGram(s) Oral daily  methylPREDNISolone sodium succinate Injectable 10 milliGRAM(s) IV Push daily  pantoprazole    Tablet 40 milliGRAM(s) Oral before breakfast  sildenafil (REVATIO) 20 milliGRAM(s) Oral three times a day  simvastatin 20 milliGRAM(s) Oral at bedtime  traMADol 50 milliGRAM(s) Oral two times a day      MEDICATIONS  (PRN):  acetaminophen    Suspension .. 650 milliGRAM(s) Oral every 6 hours PRN Temp greater or equal to 38C (100.4F), Mild Pain (1 - 3), Moderate Pain (4 - 6)  dextrose 40% Gel 15 Gram(s) Oral once PRN Blood Glucose LESS THAN 70 milliGRAM(s)/deciliter  glucagon  Injectable 1 milliGRAM(s) IntraMuscular once PRN Glucose LESS THAN 70 milligrams/deciliter  glucagon  Injectable 1 milliGRAM(s) IntraMuscular once PRN Glucose LESS THAN 70 milligrams/deciliter      Allergies    No Known Allergies    Intolerances    Seroquel (Other)        PHYSICAL EXAM:  Vital Signs Last 24 Hrs  T(C): 37.2 (07 Mar 2019 06:18), Max: 38.3 (06 Mar 2019 17:00)  T(F): 99 (07 Mar 2019 06:18), Max: 101 (06 Mar 2019 17:00)  HR: 104 (07 Mar 2019 14:13) (100 - 118)  BP: 130/90 (07 Mar 2019 06:18) (127/88 - 130/90)  BP(mean): --  RR: 20 (07 Mar 2019 06:18) (20 - 22)  SpO2: 100% (07 Mar 2019 14:13) (95% - 100%)  GENERAL: acutely ill, on high flow o2  GI: Soft, nontender, non distended  PSYCH: alert, making needs known      CAPILLARY BLOOD GLUCOSE  POCT Blood Glucose.: 337 mg/dL (07 Mar 2019 11:41)  POCT Blood Glucose.: 334 mg/dL (07 Mar 2019 07:57)  POCT Blood Glucose.: 397 mg/dL (06 Mar 2019 21:20)  POCT Blood Glucose.: 372 mg/dL (06 Mar 2019 17:08)  POCT Blood Glucose.: 385 mg/dL (06 Mar 2019 16:58)    POCT Blood Glucose.: 175 mg/dL (05 Mar 2019 11:55)  POCT Blood Glucose.: 186 mg/dL (05 Mar 2019 07:57)  POCT Blood Glucose.: 121 mg/dL (04 Mar 2019 21:15)  POCT Blood Glucose.: 160 mg/dL (04 Mar 2019 16:56)    POCT Blood Glucose.: 233 mg/dL (28 Feb 2019 11:13)  POCT Blood Glucose.: 203 mg/dL (28 Feb 2019 08:33)  POCT Blood Glucose.: 192 mg/dL (28 Feb 2019 08:07)  POCT Blood Glucose.: 112 mg/dL (27 Feb 2019 21:55)  POCT Blood Glucose.: 113 mg/dL (27 Feb 2019 18:02)    POCT Blood Glucose.: 203 mg/dL (26 Feb 2019 11:31)  POCT Blood Glucose.: 265 mg/dL (26 Feb 2019 06:32)  POCT Blood Glucose.: 118 mg/dL (25 Feb 2019 23:08)  POCT Blood Glucose.: 207 mg/dL (25 Feb 2019 18:24)    POCT Blood Glucose.: 366 mg/dL (25 Feb 2019 12:21)  POCT Blood Glucose.: 191 mg/dL (25 Feb 2019 05:43)  POCT Blood Glucose.: 148 mg/dL (25 Feb 2019 00:18)  POCT Blood Glucose.: 168 mg/dL (24 Feb 2019 17:10)    POCT Blood Glucose.: 168 mg/dL (02-24-19 @ 17:10)  POCT Blood Glucose.: 308 mg/dL (02-24-19 @ 12:09)  POCT Blood Glucose.: 227 mg/dL (02-24-19 @ 06:06)  POCT Blood Glucose.: 221 mg/dL (02-24-19 @ 00:15)  POCT Blood Glucose.: 180 mg/dL (02-23-19 @ 17:56)  POCT Blood Glucose.: 313 mg/dL (02-23-19 @ 11:58)  POCT Blood Glucose.: 163 mg/dL (02-23-19 @ 06:09)  POCT Blood Glucose.: 137 mg/dL (02-23-19 @ 00:23)  POCT Blood Glucose.: 146 mg/dL (02-22-19 @ 17:42)  POCT Blood Glucose.: 321 mg/dL (02-22-19 @ 13:15)  POCT Blood Glucose.: 130 mg/dL (02-22-19 @ 05:44)  POCT Blood Glucose.: 112 mg/dL (02-21-19 @ 23:58)  POCT Blood Glucose.: 129 mg/dL (02-21-19 @ 17:57)      02-24    139  |  92<L>  |  52<H>  ----------------------------<  239<H>  3.9   |  26  |  1.24    EGFR if : 54  EGFR if non : 47    Ca    10.3      02-24  Mg     2.2     02-24  Phos  5.6     02-24    TPro  7.7  /  Alb  3.6  /  TBili  0.5  /  DBili  x   /  AST  31  /  ALT  27  /  AlkPhos  167<H>  02-24        Thyroid Function Tests:      Hemoglobin A1C, Whole Blood: 7.1 % <H> [4.0 - 5.6] (12-17-18 @ 03:50)  Hemoglobin A1C, Whole Blood: 7.3 % <H> [4.0 - 5.6] (12-16-18 @ 04:17)

## 2019-03-07 NOTE — PROGRESS NOTE ADULT - PROBLEM SELECTOR PLAN 1
BG elevated above goal of 140-180 mg/dl.-   increase lantus to 23 units s qqhs  increase humalog to 5 units sq tid ac  and continue low dose humalog correctional scale q6h.  will trend and f/u  Dc plan TBD.  Patient was on insulin prior to hospitalization.  Would have RN's review insulin PEN adminsitration with her and a family member with return demonstration.

## 2019-03-07 NOTE — PROGRESS NOTE ADULT - ATTENDING COMMENTS
Continue to wean O2. On HFNC during day and bilevel at night.  Continue diuretics - will dose Lasix 40 mg IVP.   Also reports pain in RLE, similar to chronic pain she has - will increase gabapentin and start NSAIDS.  Daughter actually to arrive today.   Will follow up with patient regarding GOC discussion after daughter arrives.  OOB to chair.

## 2019-03-07 NOTE — PROGRESS NOTE ADULT - SUBJECTIVE AND OBJECTIVE BOX
CHIEF COMPLAINT: Patient is a 63y old  Female who presents with a chief complaint of Patient is a 63y old  Female who presents with a chief complaint of sbo (04 Mar 2019 18:00) (05 Mar 2019 08:39)    Interval Events:      REVIEW OF SYSTEMS:  Constitutional:   Eyes:  ENT:  CV:  Resp:  GI:  :  MSK:  Integumentary:  Neurological:  Psychiatric:  Endocrine:  Hematologic/Lymphatic:  Allergic/Immunologic:  [ ] All other systems negative  [ ] Unable to assess ROS because ________      OBJECTIVE:  ICU Vital Signs Last 24 Hrs  T(C): 37.2 (07 Mar 2019 06:18), Max: 38.3 (06 Mar 2019 17:00)  T(F): 99 (07 Mar 2019 06:18), Max: 101 (06 Mar 2019 17:00)  HR: 110 (07 Mar 2019 06:39) (100 - 130)  BP: 130/90 (07 Mar 2019 06:18) (127/88 - 132/73)  BP(mean): --  ABP: --  ABP(mean): --  RR: 20 (07 Mar 2019 06:18) (19 - 22)  SpO2: 96% (07 Mar 2019 06:39) (95% - 99%)    POCT Blood Glucose.: 397 mg/dL (06 Mar 2019 21:20)    HOSPITAL MEDICATIONS:  MEDICATIONS  (STANDING):  ascorbic acid Syrup 1000 milliGRAM(s) Oral once  chlorhexidine 4% Liquid 1 Application(s) Topical <User Schedule>  collagenase Ointment 1 Application(s) Topical daily  dextrose 5%. 1000 milliLiter(s) (50 mL/Hr) IV Continuous <Continuous>  dextrose 50% Injectable 12.5 Gram(s) IV Push once  dextrose 50% Injectable 25 Gram(s) IV Push once  dextrose 50% Injectable 25 Gram(s) IV Push once  fentaNYL   Patch  50 MICROgram(s)/Hr 1 Patch Transdermal every 72 hours  gabapentin 300 milliGRAM(s) Oral two times a day  heparin  Injectable 7500 Unit(s) SubCutaneous every 8 hours  insulin glargine Injectable (LANTUS) 20 Unit(s) SubCutaneous at bedtime  insulin lispro (HumaLOG) corrective regimen sliding scale   SubCutaneous three times a day before meals  insulin lispro Injectable (HumaLOG) 4 Unit(s) SubCutaneous three times a day before meals  levothyroxine 137 MICROGram(s) Oral <User Schedule>  methylPREDNISolone sodium succinate Injectable 10 milliGRAM(s) IV Push daily  pantoprazole    Tablet 40 milliGRAM(s) Oral before breakfast  sildenafil (REVATIO) 20 milliGRAM(s) Oral three times a day  simvastatin 20 milliGRAM(s) Oral at bedtime  traMADol 50 milliGRAM(s) Oral two times a day    MEDICATIONS  (PRN):  acetaminophen   Tablet .. 650 milliGRAM(s) Oral every 6 hours PRN Temp greater or equal to 38C (100.4F), Moderate Pain (4 - 6)  dextrose 40% Gel 15 Gram(s) Oral once PRN Blood Glucose LESS THAN 70 milliGRAM(s)/deciLiter  glucagon  Injectable 1 milliGRAM(s) IntraMuscular once PRN Glucose <70 milliGRAM(s)/deciLiter  oxyCODONE    IR 5 milliGRAM(s) Oral every 4 hours PRN moderate and severe pain      LABS:                        9.1    12.18 )-----------( 376      ( 07 Mar 2019 04:40 )             30.3     03-07    124<L>  |  90<L>  |  22  ----------------------------<  381<H>  5.0   |  19<L>  |  0.89    Ca    9.0      07 Mar 2019 04:40    TPro  6.9  /  Alb  2.9<L>  /  TBili  0.3  /  DBili  x   /  AST  16  /  ALT  16  /  AlkPhos  104  03-07      Urinalysis Basic - ( 06 Mar 2019 21:00 )    Color: YELLOW / Appearance: Lt TURBID / S.019 / pH: 5.5  Gluc: 150 / Ketone: TRACE  / Bili: NEGATIVE / Urobili: NORMAL   Blood: NEGATIVE / Protein: 70 / Nitrite: NEGATIVE   Leuk Esterase: MODERATE / RBC: 6-10 / WBC 26-50   Sq Epi: MANY / Non Sq Epi: x / Bacteria: NEGATIVE            MICROBIOLOGY:     RADIOLOGY:  [ ] Reviewed and interpreted by me    PULMONARY FUNCTION TESTS:    EKG: CHIEF COMPLAINT: Patient is a 63y old  Female who presents with a chief complaint of Patient is a 63y old  Female who presents with a chief complaint of sbo (04 Mar 2019 18:00) (05 Mar 2019 08:39)    Interval Events: none overnight, still on high flow      REVIEW OF SYSTEMS:  Constitutional: c/o foot pain  CV: denies  Resp: denies  GI: denies  [x] All other systems negative  [ ] Unable to assess ROS because ________      OBJECTIVE:  ICU Vital Signs Last 24 Hrs  T(C): 37.2 (07 Mar 2019 06:18), Max: 38.3 (06 Mar 2019 17:00)  T(F): 99 (07 Mar 2019 06:18), Max: 101 (06 Mar 2019 17:00)  HR: 110 (07 Mar 2019 06:39) (100 - 130)  BP: 130/90 (07 Mar 2019 06:18) (127/88 - 132/73)  BP(mean): --  ABP: --  ABP(mean): --  RR: 20 (07 Mar 2019 06:18) (19 - 22)  SpO2: 96% (07 Mar 2019 06:39) (95% - 99%)    POCT Blood Glucose.: 397 mg/dL (06 Mar 2019 21:20)    HOSPITAL MEDICATIONS:  MEDICATIONS  (STANDING):  ascorbic acid Syrup 1000 milliGRAM(s) Oral once  chlorhexidine 4% Liquid 1 Application(s) Topical <User Schedule>  collagenase Ointment 1 Application(s) Topical daily  dextrose 5%. 1000 milliLiter(s) (50 mL/Hr) IV Continuous <Continuous>  dextrose 50% Injectable 12.5 Gram(s) IV Push once  dextrose 50% Injectable 25 Gram(s) IV Push once  dextrose 50% Injectable 25 Gram(s) IV Push once  fentaNYL   Patch  50 MICROgram(s)/Hr 1 Patch Transdermal every 72 hours  gabapentin 300 milliGRAM(s) Oral two times a day  heparin  Injectable 7500 Unit(s) SubCutaneous every 8 hours  insulin glargine Injectable (LANTUS) 20 Unit(s) SubCutaneous at bedtime  insulin lispro (HumaLOG) corrective regimen sliding scale   SubCutaneous three times a day before meals  insulin lispro Injectable (HumaLOG) 4 Unit(s) SubCutaneous three times a day before meals  levothyroxine 137 MICROGram(s) Oral <User Schedule>  methylPREDNISolone sodium succinate Injectable 10 milliGRAM(s) IV Push daily  pantoprazole    Tablet 40 milliGRAM(s) Oral before breakfast  sildenafil (REVATIO) 20 milliGRAM(s) Oral three times a day  simvastatin 20 milliGRAM(s) Oral at bedtime  traMADol 50 milliGRAM(s) Oral two times a day    MEDICATIONS  (PRN):  acetaminophen   Tablet .. 650 milliGRAM(s) Oral every 6 hours PRN Temp greater or equal to 38C (100.4F), Moderate Pain (4 - 6)  dextrose 40% Gel 15 Gram(s) Oral once PRN Blood Glucose LESS THAN 70 milliGRAM(s)/deciLiter  glucagon  Injectable 1 milliGRAM(s) IntraMuscular once PRN Glucose <70 milliGRAM(s)/deciLiter  oxyCODONE    IR 5 milliGRAM(s) Oral every 4 hours PRN moderate and severe pain      LABS:                        9.1    12.18 )-----------( 376      ( 07 Mar 2019 04:40 )             30.3     03-07    124<L>  |  90<L>  |  22  ----------------------------<  381<H>  5.0   |  19<L>  |  0.89    Ca    9.0      07 Mar 2019 04:40    TPro  6.9  /  Alb  2.9<L>  /  TBili  0.3  /  DBili  x   /  AST  16  /  ALT  16  /  AlkPhos  104  03-07

## 2019-03-07 NOTE — PROGRESS NOTE ADULT - PROBLEM SELECTOR PLAN 7
- SBO s/p ex-lap with findings of healthy bowel c/b evisceration of bowel requiring placement of retention sutures  - sx saw pt today and evaluated wound - retention sutures removed   - cont dressing to wound change 2x day

## 2019-03-07 NOTE — PROGRESS NOTE ADULT - PROBLEM SELECTOR PLAN 2
- had 27 days of Vanc, 3 days of Ceftriaxone, and 8 days of Zosyn for enterococcal bacteremia  - cultures cleared  - cont to monitor/ follow temps

## 2019-03-07 NOTE — PROGRESS NOTE ADULT - PROBLEM SELECTOR PLAN 9
- family meeting held - Pt to make decision regarding trach/feeding tube when dtr arrives and will let us know her wishes

## 2019-03-07 NOTE — PROVIDER CONTACT NOTE (OTHER) - ASSESSMENT
No acute distress noted
VSS
Blood [id number]/93, HR 94  afebrile  Pain manged w/PCA pump
No acute distress noted. Pt is asymptomatic
Patient A&Ox4, denies any discomfort
Patient on FS q6h
Patient refused turning and repositioning q2 hours, ET tube repositioning q2 hours, sequential compression devices, subq heparin, and z-flow boots throughout shift. Education was provided to the patient and family about the benefits of each intervention and the possible risks associated with refusing each intervention mentioned above. Patient and son still refused the interventions mentioned above after education provided.
Pt  lethargic, shivering
Pt agitated and angry complaining of pain
Pt alert and oriented x4, +2 generalized edema, +3 in right and left arm. No visible difference in edema between either left or right arm. IV in left arm flushes well has positive blood return, no hardened area around IV site. MD contacted d/t notable dry redness on left upper extremity and increased ecchymosis on left vs right upper extremity.
Pt alert, no sign of distress noted
Pt on bed complaining that dressing at surgical site is saturated
T 100.6F rectally
T 101, 
drowsy, A*O*4
pt alert, lethargic.
pt c/o 9/10 abdominal pain, vitals stable, pt stable.
pt received tylenol at 6 PM, oxycodone at 8 PM and states she is still in pain. pt refusing 10 PM meds until she receives adequate pain relief from medication. pt would like to speak with provider.
pt willing to take medication, pt stable, VSS.

## 2019-03-08 LAB
ANION GAP SERPL CALC-SCNC: 16 MMO/L — HIGH (ref 7–14)
BACTERIA UR CULT: SIGNIFICANT CHANGE UP
BUN SERPL-MCNC: 28 MG/DL — HIGH (ref 7–23)
CALCIUM SERPL-MCNC: 9.1 MG/DL — SIGNIFICANT CHANGE UP (ref 8.4–10.5)
CHLORIDE SERPL-SCNC: 91 MMOL/L — LOW (ref 98–107)
CO2 SERPL-SCNC: 23 MMOL/L — SIGNIFICANT CHANGE UP (ref 22–31)
CREAT SERPL-MCNC: 1.31 MG/DL — HIGH (ref 0.5–1.3)
GLUCOSE BLDC GLUCOMTR-MCNC: 183 MG/DL — HIGH (ref 70–99)
GLUCOSE BLDC GLUCOMTR-MCNC: 308 MG/DL — HIGH (ref 70–99)
GLUCOSE BLDC GLUCOMTR-MCNC: 315 MG/DL — HIGH (ref 70–99)
GLUCOSE BLDC GLUCOMTR-MCNC: 399 MG/DL — HIGH (ref 70–99)
GLUCOSE SERPL-MCNC: 184 MG/DL — HIGH (ref 70–99)
HCT VFR BLD CALC: 30.9 % — LOW (ref 34.5–45)
HGB BLD-MCNC: 9.2 G/DL — LOW (ref 11.5–15.5)
MCHC RBC-ENTMCNC: 28.4 PG — SIGNIFICANT CHANGE UP (ref 27–34)
MCHC RBC-ENTMCNC: 29.8 % — LOW (ref 32–36)
MCV RBC AUTO: 95.4 FL — SIGNIFICANT CHANGE UP (ref 80–100)
NRBC # FLD: 0.05 K/UL — LOW (ref 25–125)
PLATELET # BLD AUTO: 498 K/UL — HIGH (ref 150–400)
PMV BLD: 9.4 FL — SIGNIFICANT CHANGE UP (ref 7–13)
POTASSIUM SERPL-MCNC: 3.9 MMOL/L — SIGNIFICANT CHANGE UP (ref 3.5–5.3)
POTASSIUM SERPL-SCNC: 3.9 MMOL/L — SIGNIFICANT CHANGE UP (ref 3.5–5.3)
RBC # BLD: 3.24 M/UL — LOW (ref 3.8–5.2)
RBC # FLD: 17.2 % — HIGH (ref 10.3–14.5)
SODIUM SERPL-SCNC: 130 MMOL/L — LOW (ref 135–145)
SPECIMEN SOURCE: SIGNIFICANT CHANGE UP
WBC # BLD: 10.62 K/UL — HIGH (ref 3.8–10.5)
WBC # FLD AUTO: 10.62 K/UL — HIGH (ref 3.8–10.5)

## 2019-03-08 PROCEDURE — 99233 SBSQ HOSP IP/OBS HIGH 50: CPT | Mod: GC

## 2019-03-08 PROCEDURE — 99232 SBSQ HOSP IP/OBS MODERATE 35: CPT

## 2019-03-08 RX ORDER — INSULIN LISPRO 100/ML
VIAL (ML) SUBCUTANEOUS AT BEDTIME
Qty: 0 | Refills: 0 | Status: DISCONTINUED | OUTPATIENT
Start: 2019-03-08 | End: 2019-03-10

## 2019-03-08 RX ORDER — INSULIN LISPRO 100/ML
7 VIAL (ML) SUBCUTANEOUS
Qty: 0 | Refills: 0 | Status: DISCONTINUED | OUTPATIENT
Start: 2019-03-08 | End: 2019-03-09

## 2019-03-08 RX ADMIN — Medication 1 APPLICATION(S): at 11:44

## 2019-03-08 RX ADMIN — GABAPENTIN 300 MILLIGRAM(S): 400 CAPSULE ORAL at 06:28

## 2019-03-08 RX ADMIN — TRAMADOL HYDROCHLORIDE 50 MILLIGRAM(S): 50 TABLET ORAL at 18:08

## 2019-03-08 RX ADMIN — GABAPENTIN 300 MILLIGRAM(S): 400 CAPSULE ORAL at 13:39

## 2019-03-08 RX ADMIN — SIMVASTATIN 20 MILLIGRAM(S): 20 TABLET, FILM COATED ORAL at 21:35

## 2019-03-08 RX ADMIN — Medication 4: at 12:16

## 2019-03-08 RX ADMIN — FENTANYL CITRATE 1 PATCH: 50 INJECTION INTRAVENOUS at 07:00

## 2019-03-08 RX ADMIN — Medication 1 TABLET(S): at 18:08

## 2019-03-08 RX ADMIN — FENTANYL CITRATE 1 PATCH: 50 INJECTION INTRAVENOUS at 19:32

## 2019-03-08 RX ADMIN — PANTOPRAZOLE SODIUM 40 MILLIGRAM(S): 20 TABLET, DELAYED RELEASE ORAL at 06:38

## 2019-03-08 RX ADMIN — HEPARIN SODIUM 7500 UNIT(S): 5000 INJECTION INTRAVENOUS; SUBCUTANEOUS at 06:37

## 2019-03-08 RX ADMIN — Medication 1 TABLET(S): at 11:43

## 2019-03-08 RX ADMIN — OXYCODONE HYDROCHLORIDE 5 MILLIGRAM(S): 5 TABLET ORAL at 22:04

## 2019-03-08 RX ADMIN — CHLORHEXIDINE GLUCONATE 1 APPLICATION(S): 213 SOLUTION TOPICAL at 08:54

## 2019-03-08 RX ADMIN — Medication 10 MILLIGRAM(S): at 06:29

## 2019-03-08 RX ADMIN — Medication 2: at 21:35

## 2019-03-08 RX ADMIN — Medication 500 MILLIGRAM(S): at 23:13

## 2019-03-08 RX ADMIN — Medication 5 UNIT(S): at 08:28

## 2019-03-08 RX ADMIN — TRAMADOL HYDROCHLORIDE 50 MILLIGRAM(S): 50 TABLET ORAL at 06:37

## 2019-03-08 RX ADMIN — Medication 20 MILLIGRAM(S): at 06:31

## 2019-03-08 RX ADMIN — Medication 7 UNIT(S): at 18:07

## 2019-03-08 RX ADMIN — HEPARIN SODIUM 7500 UNIT(S): 5000 INJECTION INTRAVENOUS; SUBCUTANEOUS at 21:36

## 2019-03-08 RX ADMIN — OXYCODONE HYDROCHLORIDE 5 MILLIGRAM(S): 5 TABLET ORAL at 16:05

## 2019-03-08 RX ADMIN — Medication 5 UNIT(S): at 12:15

## 2019-03-08 RX ADMIN — Medication 5: at 18:07

## 2019-03-08 RX ADMIN — Medication 1: at 08:28

## 2019-03-08 RX ADMIN — Medication 1 TABLET(S): at 08:53

## 2019-03-08 RX ADMIN — Medication 20 MILLIGRAM(S): at 21:33

## 2019-03-08 RX ADMIN — Medication 500 MILLIGRAM(S): at 11:43

## 2019-03-08 RX ADMIN — Medication 40 MILLIGRAM(S): at 06:28

## 2019-03-08 RX ADMIN — TRAMADOL HYDROCHLORIDE 50 MILLIGRAM(S): 50 TABLET ORAL at 07:14

## 2019-03-08 RX ADMIN — Medication 1 PACKET(S): at 11:43

## 2019-03-08 RX ADMIN — INSULIN GLARGINE 23 UNIT(S): 100 INJECTION, SOLUTION SUBCUTANEOUS at 21:35

## 2019-03-08 RX ADMIN — OXYCODONE HYDROCHLORIDE 5 MILLIGRAM(S): 5 TABLET ORAL at 15:35

## 2019-03-08 RX ADMIN — GABAPENTIN 300 MILLIGRAM(S): 400 CAPSULE ORAL at 21:33

## 2019-03-08 RX ADMIN — OXYCODONE HYDROCHLORIDE 5 MILLIGRAM(S): 5 TABLET ORAL at 21:34

## 2019-03-08 RX ADMIN — Medication 137 MICROGRAM(S): at 06:31

## 2019-03-08 RX ADMIN — Medication 20 MILLIGRAM(S): at 13:39

## 2019-03-08 NOTE — PROGRESS NOTE ADULT - SUBJECTIVE AND OBJECTIVE BOX
Chief Complaint: Hyperglycemia    History: Patient reports tolerating eating meals, prandial hyperglycemia persisting today    MEDICATIONS  (STANDING):  ascorbic acid Syrup 1000 milliGRAM(s) Oral once  chlorhexidine 4% Liquid 1 Application(s) Topical <User Schedule>  collagenase Ointment 1 Application(s) Topical daily  dextrose 5%. 1000 milliLiter(s) (50 mL/Hr) IV Continuous <Continuous>  dextrose 50% Injectable 12.5 Gram(s) IV Push once  dextrose 50% Injectable 25 Gram(s) IV Push once  dextrose 50% Injectable 25 Gram(s) IV Push once  fentaNYL   Patch  50 MICROgram(s)/Hr 1 Patch Transdermal every 72 hours  furosemide   Injectable 40 milliGRAM(s) IV Push daily  gabapentin 300 milliGRAM(s) Oral three times a day  heparin  Injectable 7500 Unit(s) SubCutaneous every 8 hours  insulin glargine Injectable (LANTUS) 23 Unit(s) SubCutaneous at bedtime  insulin lispro (HumaLOG) corrective regimen sliding scale   SubCutaneous three times a day before meals  insulin lispro Injectable (HumaLOG) 5 Unit(s) SubCutaneous three times a day before meals  lactobacillus acidophilus 1 Tablet(s) Oral three times a day with meals  levothyroxine 137 MICROGram(s) Oral <User Schedule>  methylPREDNISolone sodium succinate Injectable 10 milliGRAM(s) IV Push daily  naproxen 500 milliGRAM(s) Oral two times a day  pantoprazole    Tablet 40 milliGRAM(s) Oral before breakfast  psyllium Powder 1 Packet(s) Oral daily  sildenafil (REVATIO) 20 milliGRAM(s) Oral three times a day  simvastatin 20 milliGRAM(s) Oral at bedtime  traMADol 50 milliGRAM(s) Oral two times a day    MEDICATIONS  (PRN):  acetaminophen   Tablet .. 650 milliGRAM(s) Oral every 6 hours PRN Temp greater or equal to 38C (100.4F), Moderate Pain (4 - 6)  dextrose 40% Gel 15 Gram(s) Oral once PRN Blood Glucose LESS THAN 70 milliGRAM(s)/deciLiter  glucagon  Injectable 1 milliGRAM(s) IntraMuscular once PRN Glucose <70 milliGRAM(s)/deciLiter  oxyCODONE    IR 5 milliGRAM(s) Oral every 4 hours PRN moderate and severe pain        No Known Allergies    Intolerances    Seroquel (Other)    Review of Systems:  Constitutional: No fever  Eyes: No blurry vision  HEENT: No pain  Cardiovascular: No chest pain, palpitations  Respiratory: No SOB at present time, no cough  GI: No nausea, vomiting, abdominal pain  Endocrine: no polyuria, polydipsia      PHYSICAL EXAM:  VITALS: T(C): 36.9 (03-08-19 @ 06:26)  T(F): 98.5 (03-08-19 @ 06:26), Max: 99 (03-07-19 @ 21:15)  HR: 101 (03-08-19 @ 11:09) (99 - 123)  BP: 126/88 (03-08-19 @ 06:26) (113/69 - 126/88)  RR:  (18 - 20)  SpO2:  (96% - 100%)  Wt(kg): --  GENERAL: NAD, on high flow o2  RESP: non labored at present time  GI: Soft, nontender, non distended  PSYCH: alert, making needs known      CAPILLARY BLOOD GLUCOSE      POCT Blood Glucose.: 315 mg/dL (08 Mar 2019 11:57)  POCT Blood Glucose.: 183 mg/dL (08 Mar 2019 07:45)  POCT Blood Glucose.: 271 mg/dL (07 Mar 2019 22:16)  POCT Blood Glucose.: 314 mg/dL (07 Mar 2019 17:00)      03-08    130<L>  |  91<L>  |  28<H>  ----------------------------<  184<H>  3.9   |  23  |  1.31<H>    EGFR if : 51  EGFR if non : 44    Ca    9.1      03-08    TPro  6.9  /  Alb  2.9<L>  /  TBili  0.3  /  DBili  x   /  AST  16  /  ALT  16  /  AlkPhos  104  03-07        Hemoglobin A1C, Whole Blood: 7.1 % <H> [4.0 - 5.6] (12-17-18 @ 03:50)  Hemoglobin A1C, Whole Blood: 7.3 % <H> [4.0 - 5.6] (12-16-18 @ 04:17)

## 2019-03-08 NOTE — PROGRESS NOTE ADULT - ATTENDING COMMENTS
On HFNC during day and bilevel at night.  Continuing to titrate O2 slowly.  Continue Lasix 40 mg IVP.   Also reports pain in RLE improved with increased gabapentin and pain medication.  Daughter (Heavenly) and son (Norris) at bedside. Discussed GOC.   Patient unable to make a decision at this time but for now "wants everything done".  Encourage OOB to chair.

## 2019-03-08 NOTE — PROGRESS NOTE ADULT - PROBLEM SELECTOR PLAN 1
BG elevated above goal of 140-180 mg/dl  continue lantus to 23 units QHS as fasting glucose was at goal  increase humalog to 7 units sq tid ac for prandial hyperglycemia  and continue low dose humalog correctional scale q6h at this time, may need increase to moderate correctional scale if prandial readings remain elevated above 200  will trend and f/u  DC plan TBD.  Patient was on insulin prior to hospitalization.  Would have RN's review insulin PEN administration with her and a family member with return demonstration. BG elevated above goal of 140-180 mg/dl  continue lantus to 23 units QHS as fasting glucose was at goal  increase humalog to 7 units sq tid ac for prandial hyperglycemia  and continue low dose humalog before meals at this time, but added bedtime scale also. May need increase to moderate correctional scale if prandial readings remain elevated above 200  will trend and f/u  DC plan TBD.  Patient was on insulin prior to hospitalization.  Would have RN's review insulin PEN administration with her and a family member with return demonstration.

## 2019-03-08 NOTE — PROGRESS NOTE ADULT - ASSESSMENT
63 F with PMH of COPD (on home O2 (3L), hypothyroidism, T2DM (HbA1c 8s 11/2018, on insulin), RA on prednisone (10 mg daily x 30 years), HTN,  admitted for SBO s/p ex lap with lysis of adhesions, had been intubated for hypercapnic respiratory failure, now extubated

## 2019-03-08 NOTE — PROGRESS NOTE ADULT - PROBLEM SELECTOR PLAN 7
- SBO s/p ex-lap with findings of healthy bowel c/b evisceration of bowel requiring placement of retention sutures  - sx evaluated wound - retention sutures removed   - cont dressing to wound change 2x day

## 2019-03-08 NOTE — PROGRESS NOTE ADULT - SUBJECTIVE AND OBJECTIVE BOX
CHIEF COMPLAINT: Patient is a 63y old  Female who presents with a chief complaint of Patient is a 63y old  Female who presents with a chief complaint of sbo (04 Mar 2019 18:00) (05 Mar 2019 08:39)    Interval Events:      REVIEW OF SYSTEMS:  Constitutional:   Eyes:  ENT:  CV:  Resp:  GI:  :  MSK:  Integumentary:  Neurological:  Psychiatric:  Endocrine:  Hematologic/Lymphatic:  Allergic/Immunologic:  [ ] All other systems negative  [ ] Unable to assess ROS because ________      OBJECTIVE:  ICU Vital Signs Last 24 Hrs  T(C): 36.9 (08 Mar 2019 06:26), Max: 37.2 (07 Mar 2019 21:15)  T(F): 98.5 (08 Mar 2019 06:26), Max: 99 (07 Mar 2019 21:15)  HR: 105 (08 Mar 2019 06:26) (99 - 123)  BP: 126/88 (08 Mar 2019 06:26) (113/69 - 126/88)  BP(mean): --  ABP: --  ABP(mean): --  RR: 19 (08 Mar 2019 06:26) (19 - 20)  SpO2: 96% (08 Mar 2019 06:26) (95% - 100%)    POCT Blood Glucose.: 271 mg/dL (07 Mar 2019 22:16)    HOSPITAL MEDICATIONS:  MEDICATIONS  (STANDING):  ascorbic acid Syrup 1000 milliGRAM(s) Oral once  chlorhexidine 4% Liquid 1 Application(s) Topical <User Schedule>  collagenase Ointment 1 Application(s) Topical daily  dextrose 5%. 1000 milliLiter(s) (50 mL/Hr) IV Continuous <Continuous>  dextrose 50% Injectable 12.5 Gram(s) IV Push once  dextrose 50% Injectable 25 Gram(s) IV Push once  dextrose 50% Injectable 25 Gram(s) IV Push once  fentaNYL   Patch  50 MICROgram(s)/Hr 1 Patch Transdermal every 72 hours  furosemide   Injectable 40 milliGRAM(s) IV Push daily  gabapentin 300 milliGRAM(s) Oral three times a day  heparin  Injectable 7500 Unit(s) SubCutaneous every 8 hours  insulin glargine Injectable (LANTUS) 23 Unit(s) SubCutaneous at bedtime  insulin lispro (HumaLOG) corrective regimen sliding scale   SubCutaneous three times a day before meals  insulin lispro Injectable (HumaLOG) 5 Unit(s) SubCutaneous three times a day before meals  lactobacillus acidophilus 1 Tablet(s) Oral three times a day with meals  levothyroxine 137 MICROGram(s) Oral <User Schedule>  methylPREDNISolone sodium succinate Injectable 10 milliGRAM(s) IV Push daily  naproxen 500 milliGRAM(s) Oral two times a day  pantoprazole    Tablet 40 milliGRAM(s) Oral before breakfast  psyllium Powder 1 Packet(s) Oral daily  sildenafil (REVATIO) 20 milliGRAM(s) Oral three times a day  simvastatin 20 milliGRAM(s) Oral at bedtime  traMADol 50 milliGRAM(s) Oral two times a day    MEDICATIONS  (PRN):  acetaminophen   Tablet .. 650 milliGRAM(s) Oral every 6 hours PRN Temp greater or equal to 38C (100.4F), Moderate Pain (4 - 6)  dextrose 40% Gel 15 Gram(s) Oral once PRN Blood Glucose LESS THAN 70 milliGRAM(s)/deciLiter  glucagon  Injectable 1 milliGRAM(s) IntraMuscular once PRN Glucose <70 milliGRAM(s)/deciLiter  oxyCODONE    IR 5 milliGRAM(s) Oral every 4 hours PRN moderate and severe pain      LABS:                        9.2    10.62 )-----------( 498      ( 08 Mar 2019 05:20 )             30.9     03-08    130<L>  |  91<L>  |  28<H>  ----------------------------<  184<H>  3.9   |  23  |  1.31<H>    Ca    9.1      08 Mar 2019 05:20    TPro  6.9  /  Alb  2.9<L>  /  TBili  0.3  /  DBili  x   /  AST  16  /  ALT  16  /  AlkPhos  104  03-07      Urinalysis Basic - ( 06 Mar 2019 21:00 )    Color: YELLOW / Appearance: Lt TURBID / S.019 / pH: 5.5  Gluc: 150 / Ketone: TRACE  / Bili: NEGATIVE / Urobili: NORMAL   Blood: NEGATIVE / Protein: 70 / Nitrite: NEGATIVE   Leuk Esterase: MODERATE / RBC: 6-10 / WBC 26-50   Sq Epi: MANY / Non Sq Epi: x / Bacteria: NEGATIVE            MICROBIOLOGY:     RADIOLOGY:  [ ] Reviewed and interpreted by me    PULMONARY FUNCTION TESTS:    EKG: CHIEF COMPLAINT: Patient is a 63y old  Female who presents with a chief complaint of Patient is a 63y old  Female who presents with a chief complaint of sbo (04 Mar 2019 18:00) (05 Mar 2019 08:39)    Interval Events: none overnight      REVIEW OF SYSTEMS:  Constitutional: no complaints  CV: denies  Resp: denies  GI: denies  [x] All other systems negative  [ ] Unable to assess ROS because ________      OBJECTIVE:  ICU Vital Signs Last 24 Hrs  T(C): 36.9 (08 Mar 2019 06:26), Max: 37.2 (07 Mar 2019 21:15)  T(F): 98.5 (08 Mar 2019 06:26), Max: 99 (07 Mar 2019 21:15)  HR: 105 (08 Mar 2019 06:26) (99 - 123)  BP: 126/88 (08 Mar 2019 06:26) (113/69 - 126/88)  BP(mean): --  ABP: --  ABP(mean): --  RR: 19 (08 Mar 2019 06:26) (19 - 20)  SpO2: 96% (08 Mar 2019 06:26) (95% - 100%)    POCT Blood Glucose.: 271 mg/dL (07 Mar 2019 22:16)    HOSPITAL MEDICATIONS:  MEDICATIONS  (STANDING):  ascorbic acid Syrup 1000 milliGRAM(s) Oral once  chlorhexidine 4% Liquid 1 Application(s) Topical <User Schedule>  collagenase Ointment 1 Application(s) Topical daily  dextrose 5%. 1000 milliLiter(s) (50 mL/Hr) IV Continuous <Continuous>  dextrose 50% Injectable 12.5 Gram(s) IV Push once  dextrose 50% Injectable 25 Gram(s) IV Push once  dextrose 50% Injectable 25 Gram(s) IV Push once  fentaNYL   Patch  50 MICROgram(s)/Hr 1 Patch Transdermal every 72 hours  furosemide   Injectable 40 milliGRAM(s) IV Push daily  gabapentin 300 milliGRAM(s) Oral three times a day  heparin  Injectable 7500 Unit(s) SubCutaneous every 8 hours  insulin glargine Injectable (LANTUS) 23 Unit(s) SubCutaneous at bedtime  insulin lispro (HumaLOG) corrective regimen sliding scale   SubCutaneous three times a day before meals  insulin lispro Injectable (HumaLOG) 5 Unit(s) SubCutaneous three times a day before meals  lactobacillus acidophilus 1 Tablet(s) Oral three times a day with meals  levothyroxine 137 MICROGram(s) Oral <User Schedule>  methylPREDNISolone sodium succinate Injectable 10 milliGRAM(s) IV Push daily  naproxen 500 milliGRAM(s) Oral two times a day  pantoprazole    Tablet 40 milliGRAM(s) Oral before breakfast  psyllium Powder 1 Packet(s) Oral daily  sildenafil (REVATIO) 20 milliGRAM(s) Oral three times a day  simvastatin 20 milliGRAM(s) Oral at bedtime  traMADol 50 milliGRAM(s) Oral two times a day    MEDICATIONS  (PRN):  acetaminophen   Tablet .. 650 milliGRAM(s) Oral every 6 hours PRN Temp greater or equal to 38C (100.4F), Moderate Pain (4 - 6)  dextrose 40% Gel 15 Gram(s) Oral once PRN Blood Glucose LESS THAN 70 milliGRAM(s)/deciLiter  glucagon  Injectable 1 milliGRAM(s) IntraMuscular once PRN Glucose <70 milliGRAM(s)/deciLiter  oxyCODONE    IR 5 milliGRAM(s) Oral every 4 hours PRN moderate and severe pain      LABS:                        9.2    10.62 )-----------( 498      ( 08 Mar 2019 05:20 )             30.9     03-08    130<L>  |  91<L>  |  28<H>  ----------------------------<  184<H>  3.9   |  23  |  1.31<H>    Ca    9.1      08 Mar 2019 05:20

## 2019-03-09 LAB
ANION GAP SERPL CALC-SCNC: 15 MMO/L — HIGH (ref 7–14)
BUN SERPL-MCNC: 30 MG/DL — HIGH (ref 7–23)
CALCIUM SERPL-MCNC: 8.8 MG/DL — SIGNIFICANT CHANGE UP (ref 8.4–10.5)
CHLORIDE SERPL-SCNC: 94 MMOL/L — LOW (ref 98–107)
CO2 SERPL-SCNC: 24 MMOL/L — SIGNIFICANT CHANGE UP (ref 22–31)
CREAT SERPL-MCNC: 1.1 MG/DL — SIGNIFICANT CHANGE UP (ref 0.5–1.3)
GLUCOSE BLDC GLUCOMTR-MCNC: 213 MG/DL — HIGH (ref 70–99)
GLUCOSE BLDC GLUCOMTR-MCNC: 299 MG/DL — HIGH (ref 70–99)
GLUCOSE BLDC GLUCOMTR-MCNC: 302 MG/DL — HIGH (ref 70–99)
GLUCOSE BLDC GLUCOMTR-MCNC: 339 MG/DL — HIGH (ref 70–99)
GLUCOSE BLDC GLUCOMTR-MCNC: 375 MG/DL — HIGH (ref 70–99)
GLUCOSE SERPL-MCNC: 211 MG/DL — HIGH (ref 70–99)
HCT VFR BLD CALC: 28.3 % — LOW (ref 34.5–45)
HGB BLD-MCNC: 8.2 G/DL — LOW (ref 11.5–15.5)
MCHC RBC-ENTMCNC: 27.7 PG — SIGNIFICANT CHANGE UP (ref 27–34)
MCHC RBC-ENTMCNC: 29 % — LOW (ref 32–36)
MCV RBC AUTO: 95.6 FL — SIGNIFICANT CHANGE UP (ref 80–100)
NRBC # FLD: 0.11 K/UL — LOW (ref 25–125)
NRBC FLD-RTO: 1.1 — SIGNIFICANT CHANGE UP
PLATELET # BLD AUTO: 455 K/UL — HIGH (ref 150–400)
PMV BLD: 9.3 FL — SIGNIFICANT CHANGE UP (ref 7–13)
POTASSIUM SERPL-MCNC: 3.9 MMOL/L — SIGNIFICANT CHANGE UP (ref 3.5–5.3)
POTASSIUM SERPL-SCNC: 3.9 MMOL/L — SIGNIFICANT CHANGE UP (ref 3.5–5.3)
RBC # BLD: 2.96 M/UL — LOW (ref 3.8–5.2)
RBC # FLD: 16.6 % — HIGH (ref 10.3–14.5)
SODIUM SERPL-SCNC: 133 MMOL/L — LOW (ref 135–145)
WBC # BLD: 9.68 K/UL — SIGNIFICANT CHANGE UP (ref 3.8–10.5)
WBC # FLD AUTO: 9.68 K/UL — SIGNIFICANT CHANGE UP (ref 3.8–10.5)

## 2019-03-09 PROCEDURE — 99232 SBSQ HOSP IP/OBS MODERATE 35: CPT

## 2019-03-09 PROCEDURE — 99233 SBSQ HOSP IP/OBS HIGH 50: CPT

## 2019-03-09 RX ORDER — INSULIN LISPRO 100/ML
9 VIAL (ML) SUBCUTANEOUS
Qty: 0 | Refills: 0 | Status: DISCONTINUED | OUTPATIENT
Start: 2019-03-09 | End: 2019-03-11

## 2019-03-09 RX ORDER — FUROSEMIDE 40 MG
40 TABLET ORAL
Qty: 0 | Refills: 0 | Status: DISCONTINUED | OUTPATIENT
Start: 2019-03-09 | End: 2019-03-20

## 2019-03-09 RX ORDER — INSULIN GLARGINE 100 [IU]/ML
25 INJECTION, SOLUTION SUBCUTANEOUS AT BEDTIME
Qty: 0 | Refills: 0 | Status: DISCONTINUED | OUTPATIENT
Start: 2019-03-09 | End: 2019-03-10

## 2019-03-09 RX ORDER — FENTANYL CITRATE 50 UG/ML
1 INJECTION INTRAVENOUS
Qty: 0 | Refills: 0 | Status: DISCONTINUED | OUTPATIENT
Start: 2019-03-09 | End: 2019-03-14

## 2019-03-09 RX ADMIN — FENTANYL CITRATE 1 PATCH: 50 INJECTION INTRAVENOUS at 13:39

## 2019-03-09 RX ADMIN — FENTANYL CITRATE 1 PATCH: 50 INJECTION INTRAVENOUS at 13:40

## 2019-03-09 RX ADMIN — Medication 20 MILLIGRAM(S): at 06:47

## 2019-03-09 RX ADMIN — Medication 10 MILLIGRAM(S): at 06:47

## 2019-03-09 RX ADMIN — FENTANYL CITRATE 1 PATCH: 50 INJECTION INTRAVENOUS at 06:47

## 2019-03-09 RX ADMIN — Medication 40 MILLIGRAM(S): at 17:22

## 2019-03-09 RX ADMIN — Medication 7 UNIT(S): at 12:07

## 2019-03-09 RX ADMIN — Medication 137 MICROGRAM(S): at 06:46

## 2019-03-09 RX ADMIN — FENTANYL CITRATE 1 PATCH: 50 INJECTION INTRAVENOUS at 20:30

## 2019-03-09 RX ADMIN — Medication 1 TABLET(S): at 12:06

## 2019-03-09 RX ADMIN — PANTOPRAZOLE SODIUM 40 MILLIGRAM(S): 20 TABLET, DELAYED RELEASE ORAL at 06:46

## 2019-03-09 RX ADMIN — Medication 1 APPLICATION(S): at 12:07

## 2019-03-09 RX ADMIN — GABAPENTIN 300 MILLIGRAM(S): 400 CAPSULE ORAL at 22:36

## 2019-03-09 RX ADMIN — Medication 7 UNIT(S): at 17:22

## 2019-03-09 RX ADMIN — Medication 40 MILLIGRAM(S): at 06:46

## 2019-03-09 RX ADMIN — Medication 1 TABLET(S): at 17:22

## 2019-03-09 RX ADMIN — OXYCODONE HYDROCHLORIDE 5 MILLIGRAM(S): 5 TABLET ORAL at 10:13

## 2019-03-09 RX ADMIN — Medication 5: at 09:42

## 2019-03-09 RX ADMIN — TRAMADOL HYDROCHLORIDE 50 MILLIGRAM(S): 50 TABLET ORAL at 06:46

## 2019-03-09 RX ADMIN — Medication 500 MILLIGRAM(S): at 12:06

## 2019-03-09 RX ADMIN — Medication 7 UNIT(S): at 09:42

## 2019-03-09 RX ADMIN — Medication 4: at 12:06

## 2019-03-09 RX ADMIN — TRAMADOL HYDROCHLORIDE 50 MILLIGRAM(S): 50 TABLET ORAL at 17:22

## 2019-03-09 RX ADMIN — Medication 4: at 17:22

## 2019-03-09 RX ADMIN — OXYCODONE HYDROCHLORIDE 5 MILLIGRAM(S): 5 TABLET ORAL at 09:43

## 2019-03-09 RX ADMIN — Medication 500 MILLIGRAM(S): at 22:36

## 2019-03-09 RX ADMIN — Medication 20 MILLIGRAM(S): at 22:36

## 2019-03-09 RX ADMIN — INSULIN GLARGINE 25 UNIT(S): 100 INJECTION, SOLUTION SUBCUTANEOUS at 22:37

## 2019-03-09 RX ADMIN — SIMVASTATIN 20 MILLIGRAM(S): 20 TABLET, FILM COATED ORAL at 22:35

## 2019-03-09 RX ADMIN — Medication 1 TABLET(S): at 09:43

## 2019-03-09 RX ADMIN — Medication 20 MILLIGRAM(S): at 13:39

## 2019-03-09 RX ADMIN — GABAPENTIN 300 MILLIGRAM(S): 400 CAPSULE ORAL at 13:39

## 2019-03-09 RX ADMIN — CHLORHEXIDINE GLUCONATE 1 APPLICATION(S): 213 SOLUTION TOPICAL at 09:44

## 2019-03-09 RX ADMIN — GABAPENTIN 300 MILLIGRAM(S): 400 CAPSULE ORAL at 06:46

## 2019-03-09 NOTE — SWALLOW BEDSIDE ASSESSMENT ADULT - SWALLOW EVAL: CURRENT DIET
NGTube Feedings mechanical soft solids and thin liquids, however as per nursing she "eats everything" (regular)

## 2019-03-09 NOTE — SWALLOW BEDSIDE ASSESSMENT ADULT - ADDITIONAL RECOMMENDATIONS
Monitor PO intake/tolerance. Monitor oxygenation status during PO meals.
Monitor PO intake/tolerance. Monitor oxygenation status during PO meals.

## 2019-03-09 NOTE — PROGRESS NOTE ADULT - ATTENDING COMMENTS
s/p extubation  ARF: now on and off bilevel and high flow  ID: off the abx finally  Rheum: she remains on prednsione  DM:stable  all things considered, improved

## 2019-03-09 NOTE — PROGRESS NOTE ADULT - SUBJECTIVE AND OBJECTIVE BOX
Chief Complaint/Follow-up on: Dm    Subjective:  marked hyperglycemia yesterday   POC blood glucose and insulin use reviewed.  appetite improving per son      MEDICATIONS  (STANDING):  ascorbic acid Syrup 1000 milliGRAM(s) Oral once  chlorhexidine 4% Liquid 1 Application(s) Topical <User Schedule>  collagenase Ointment 1 Application(s) Topical daily  dextrose 5%. 1000 milliLiter(s) (50 mL/Hr) IV Continuous <Continuous>  dextrose 50% Injectable 12.5 Gram(s) IV Push once  dextrose 50% Injectable 25 Gram(s) IV Push once  dextrose 50% Injectable 25 Gram(s) IV Push once  fentaNYL   Patch  50 MICROgram(s)/Hr 1 Patch Transdermal every 72 hours  furosemide   Injectable 40 milliGRAM(s) IV Push daily  gabapentin 300 milliGRAM(s) Oral three times a day  heparin  Injectable 7500 Unit(s) SubCutaneous every 8 hours  insulin glargine Injectable (LANTUS) 23 Unit(s) SubCutaneous at bedtime  insulin lispro (HumaLOG) corrective regimen sliding scale   SubCutaneous at bedtime  insulin lispro (HumaLOG) corrective regimen sliding scale   SubCutaneous three times a day before meals  insulin lispro Injectable (HumaLOG) 7 Unit(s) SubCutaneous three times a day before meals  lactobacillus acidophilus 1 Tablet(s) Oral three times a day with meals  levothyroxine 137 MICROGram(s) Oral <User Schedule>  methylPREDNISolone sodium succinate Injectable 10 milliGRAM(s) IV Push daily  naproxen 500 milliGRAM(s) Oral two times a day  pantoprazole    Tablet 40 milliGRAM(s) Oral before breakfast  psyllium Powder 1 Packet(s) Oral daily  sildenafil (REVATIO) 20 milliGRAM(s) Oral three times a day  simvastatin 20 milliGRAM(s) Oral at bedtime  traMADol 50 milliGRAM(s) Oral two times a day    MEDICATIONS  (PRN):  acetaminophen   Tablet .. 650 milliGRAM(s) Oral every 6 hours PRN Temp greater or equal to 38C (100.4F), Moderate Pain (4 - 6)  dextrose 40% Gel 15 Gram(s) Oral once PRN Blood Glucose LESS THAN 70 milliGRAM(s)/deciLiter  glucagon  Injectable 1 milliGRAM(s) IntraMuscular once PRN Glucose <70 milliGRAM(s)/deciLiter  oxyCODONE    IR 5 milliGRAM(s) Oral every 4 hours PRN moderate and severe pain      PHYSICAL EXAM:  VITALS: T(C): 36.9 (03-09-19 @ 06:39)  T(F): 98.4 (03-09-19 @ 06:39), Max: 98.9 (03-08-19 @ 21:30)  HR: 112 (03-09-19 @ 08:11) (90 - 112)  BP: 129/83 (03-09-19 @ 06:39) (122/82 - 129/83)  RR:  (18 - 20)  SpO2:  (97% - 100%)  Wt(kg): --  GENERAL: NAD, well-groomed, well-developed  EYES: No proptosis, no injection  HEENT:  Atraumatic, Normocephalic, moist mucous membranes  RESPIRATORY: Clear to auscultation bilaterally; No rales, rhonchi, wheezing, or rubs  CARDIOVASCULAR: Regular rate and rhythm; No murmurs; no peripheral edema  GI: Soft, nontender, non distended, normal bowel sounds    POCT Blood Glucose.: 375 mg/dL (03-09-19 @ 09:41)  POCT Blood Glucose.: 299 mg/dL (03-09-19 @ 08:25)  POCT Blood Glucose.: 308 mg/dL (03-08-19 @ 21:28)  POCT Blood Glucose.: 399 mg/dL (03-08-19 @ 17:20)  POCT Blood Glucose.: 315 mg/dL (03-08-19 @ 11:57)  POCT Blood Glucose.: 183 mg/dL (03-08-19 @ 07:45)  POCT Blood Glucose.: 271 mg/dL (03-07-19 @ 22:16)  POCT Blood Glucose.: 314 mg/dL (03-07-19 @ 17:00)  POCT Blood Glucose.: 337 mg/dL (03-07-19 @ 11:41)  POCT Blood Glucose.: 334 mg/dL (03-07-19 @ 07:57)  POCT Blood Glucose.: 397 mg/dL (03-06-19 @ 21:20)  POCT Blood Glucose.: 372 mg/dL (03-06-19 @ 17:08)  POCT Blood Glucose.: 385 mg/dL (03-06-19 @ 16:58)  POCT Blood Glucose.: 325 mg/dL (03-06-19 @ 11:56)    03-09    133<L>  |  94<L>  |  30<H>  ----------------------------<  211<H>  3.9   |  24  |  1.10    EGFR if : 63  EGFR if non : 54    Ca    8.8      03-09    TPro  6.9  /  Alb  2.9<L>  /  TBili  0.3  /  DBili  x   /  AST  16  /  ALT  16  /  AlkPhos  104  03-07          Thyroid Function Tests:      Hemoglobin A1C, Whole Blood: 7.1 % <H> [4.0 - 5.6] (12-17-18 @ 03:50)  Hemoglobin A1C, Whole Blood: 7.3 % <H> [4.0 - 5.6] (12-16-18 @ 04:17)

## 2019-03-09 NOTE — PROGRESS NOTE ADULT - PROBLEM SELECTOR PLAN 7
- SBO s/p ex-lap with findings of healthy bowel c/b evisceration of bowel requiring placement of retention sutures  - sx evaluated wound - retention sutures removed   - cont dressing to wound change 2x day - SBO s/p ex-lap with findings of healthy bowel c/b evisceration of bowel requiring placement of retention sutures  - sx evaluated wound - retention sutures removed   - cont dressing to wound change 2x day  -c/w naproxen and gabapentin  -change fentanyl patch to 25mcg

## 2019-03-09 NOTE — PROGRESS NOTE ADULT - PROBLEM SELECTOR PLAN 1
BG elevated above goal of 140-180 mg/dl  increase lantus to 25 units QHS as fasting glucose was at goal (needed 2 units correction last night, so will increase Lantus slightly)  c/w humalog to 7 units sq tid ac for prandial hyperglycemia, if glucose above 200 for two or more FSBG please increase to 9 units with meals    continue low dose humalog before meals at this time, and low dose bedtime scale also.   May need increase to moderate correctional scale if prandial readings remain elevated above 200 despite increase in premeal lispro as above    inform endocrine of hypoglycemia or persistent hyperglycemia episodes as changes in pts insulin regimen will need to be made.   notify endocrine if any plans to be NPO/diet changes as this will also affect insulin regimen.    DC plan TBD.  Patient was on insulin prior to hospitalization.  Would have RN's review insulin PEN administration with her and a family member with return demonstration.

## 2019-03-09 NOTE — SWALLOW BEDSIDE ASSESSMENT ADULT - ASR SWALLOW DENTITION
no upper dentures; bottom left sided teeth; patient does not wear partial/upper dentures as per patient report
no upper dentures; bottom left sided teeth; patient does not wear partial/upper dentures as per patient report

## 2019-03-09 NOTE — SWALLOW BEDSIDE ASSESSMENT ADULT - SWALLOW EVAL: DIAGNOSIS
Baseline stats prior to PO Trials: SPO2 100% receiving 60% FiO2 high flow nasal cannula. Baseline stats prior to PO Trials: SPO2 100% receiving 60% FiO2 high flow nasal cannula.  Patient presents with functional oropharyngeal stage swallowing mechanism characterized by adequate oral containment, adequate bolus manipulation and transport for puree, solids, and thin liquids. There is laryngeal elevation upon palpation, initiation of the pharyngeal swallow. There were no overt signs of impaired airway protection for puree, solids, and thin liquids trials.  Patient's SPO2 was maintained at 100% oxygen saturation during PO intake trials.  Suggest a soft solids and thin liquids and monitor patient's respiratory status during meal time given patient currently requires high flow nasal cannula which can impact the respiratory/swallowing coordination.

## 2019-03-09 NOTE — PROGRESS NOTE ADULT - ATTENDING COMMENTS
Cristin Spicer MD  Pager [phone] ([facility])/ [phone] ([facility]) [please provide 10 digit call back number]  Nights and weekends: [phone]  Please note that this patient may be followed by a different provider tomorrow. If no answer or after hours, please contact [phone].  For final dc reccomendations, please call [phone] or page the endocrine fellow on call.

## 2019-03-09 NOTE — PROGRESS NOTE ADULT - SUBJECTIVE AND OBJECTIVE BOX
Patient is a 63y old  Female who presents with a chief complaint of Respiratory Failure (08 Mar 2019 13:03)        SUBJECTIVE / OVERNIGHT EVENTS:      MEDICATIONS  (STANDING):  ascorbic acid Syrup 1000 milliGRAM(s) Oral once  chlorhexidine 4% Liquid 1 Application(s) Topical <User Schedule>  collagenase Ointment 1 Application(s) Topical daily  dextrose 5%. 1000 milliLiter(s) (50 mL/Hr) IV Continuous <Continuous>  dextrose 50% Injectable 12.5 Gram(s) IV Push once  dextrose 50% Injectable 25 Gram(s) IV Push once  dextrose 50% Injectable 25 Gram(s) IV Push once  fentaNYL   Patch  50 MICROgram(s)/Hr 1 Patch Transdermal every 72 hours  furosemide   Injectable 40 milliGRAM(s) IV Push daily  gabapentin 300 milliGRAM(s) Oral three times a day  heparin  Injectable 7500 Unit(s) SubCutaneous every 8 hours  insulin glargine Injectable (LANTUS) 23 Unit(s) SubCutaneous at bedtime  insulin lispro (HumaLOG) corrective regimen sliding scale   SubCutaneous at bedtime  insulin lispro (HumaLOG) corrective regimen sliding scale   SubCutaneous three times a day before meals  insulin lispro Injectable (HumaLOG) 7 Unit(s) SubCutaneous three times a day before meals  lactobacillus acidophilus 1 Tablet(s) Oral three times a day with meals  levothyroxine 137 MICROGram(s) Oral <User Schedule>  methylPREDNISolone sodium succinate Injectable 10 milliGRAM(s) IV Push daily  naproxen 500 milliGRAM(s) Oral two times a day  pantoprazole    Tablet 40 milliGRAM(s) Oral before breakfast  psyllium Powder 1 Packet(s) Oral daily  sildenafil (REVATIO) 20 milliGRAM(s) Oral three times a day  simvastatin 20 milliGRAM(s) Oral at bedtime  traMADol 50 milliGRAM(s) Oral two times a day    MEDICATIONS  (PRN):  acetaminophen   Tablet .. 650 milliGRAM(s) Oral every 6 hours PRN Temp greater or equal to 38C (100.4F), Moderate Pain (4 - 6)  dextrose 40% Gel 15 Gram(s) Oral once PRN Blood Glucose LESS THAN 70 milliGRAM(s)/deciLiter  glucagon  Injectable 1 milliGRAM(s) IntraMuscular once PRN Glucose <70 milliGRAM(s)/deciLiter  oxyCODONE    IR 5 milliGRAM(s) Oral every 4 hours PRN moderate and severe pain        CAPILLARY BLOOD GLUCOSE      POCT Blood Glucose.: 308 mg/dL (08 Mar 2019 21:28)  POCT Blood Glucose.: 399 mg/dL (08 Mar 2019 17:20)  POCT Blood Glucose.: 315 mg/dL (08 Mar 2019 11:57)    I&O's Summary    08 Mar 2019 07:01  -  09 Mar 2019 07:00  --------------------------------------------------------  IN: 360 mL / OUT: 0 mL / NET: 360 mL        PHYSICAL EXAM  GENERAL: NAD, well-developed  HEAD:  Atraumatic, Normocephalic  EYES: EOMI, PERRLA, conjunctiva and sclera clear  NECK: Supple, No JVD  CHEST/LUNG: diminished breath sounds b/l  HEART: Regular rate and rhythm; No murmurs, rubs, or gallops  ABDOMEN: Soft, Nontender, Nondistended; Bowel sounds present  EXTREMITIES:  2+ Peripheral Pulses, No clubbing, cyanosis. +1 pitting edema  PSYCH: AAOx3  SKIN: No rashes or lesions    LABS:                        8.2    9.68  )-----------( 455      ( 09 Mar 2019 06:00 )             28.3     03-09    133<L>  |  94<L>  |  30<H>  ----------------------------<  211<H>  3.9   |  24  |  1.10    Ca    8.8      09 Mar 2019 06:00                RADIOLOGY & ADDITIONAL TESTS:    Imaging Personally Reviewed:  Consultant(s) Notes Reviewed:    Care Discussed with Consultants/Other Providers: Patient is a 63y old  Female who presents with a chief complaint of Respiratory Failure (08 Mar 2019 13:03)        SUBJECTIVE / OVERNIGHT EVENTS: No overnight events       MEDICATIONS  (STANDING):  ascorbic acid Syrup 1000 milliGRAM(s) Oral once  chlorhexidine 4% Liquid 1 Application(s) Topical <User Schedule>  collagenase Ointment 1 Application(s) Topical daily  dextrose 5%. 1000 milliLiter(s) (50 mL/Hr) IV Continuous <Continuous>  dextrose 50% Injectable 12.5 Gram(s) IV Push once  dextrose 50% Injectable 25 Gram(s) IV Push once  dextrose 50% Injectable 25 Gram(s) IV Push once  fentaNYL   Patch  50 MICROgram(s)/Hr 1 Patch Transdermal every 72 hours  furosemide   Injectable 40 milliGRAM(s) IV Push daily  gabapentin 300 milliGRAM(s) Oral three times a day  heparin  Injectable 7500 Unit(s) SubCutaneous every 8 hours  insulin glargine Injectable (LANTUS) 23 Unit(s) SubCutaneous at bedtime  insulin lispro (HumaLOG) corrective regimen sliding scale   SubCutaneous at bedtime  insulin lispro (HumaLOG) corrective regimen sliding scale   SubCutaneous three times a day before meals  insulin lispro Injectable (HumaLOG) 7 Unit(s) SubCutaneous three times a day before meals  lactobacillus acidophilus 1 Tablet(s) Oral three times a day with meals  levothyroxine 137 MICROGram(s) Oral <User Schedule>  methylPREDNISolone sodium succinate Injectable 10 milliGRAM(s) IV Push daily  naproxen 500 milliGRAM(s) Oral two times a day  pantoprazole    Tablet 40 milliGRAM(s) Oral before breakfast  psyllium Powder 1 Packet(s) Oral daily  sildenafil (REVATIO) 20 milliGRAM(s) Oral three times a day  simvastatin 20 milliGRAM(s) Oral at bedtime  traMADol 50 milliGRAM(s) Oral two times a day    MEDICATIONS  (PRN):  acetaminophen   Tablet .. 650 milliGRAM(s) Oral every 6 hours PRN Temp greater or equal to 38C (100.4F), Moderate Pain (4 - 6)  dextrose 40% Gel 15 Gram(s) Oral once PRN Blood Glucose LESS THAN 70 milliGRAM(s)/deciLiter  glucagon  Injectable 1 milliGRAM(s) IntraMuscular once PRN Glucose <70 milliGRAM(s)/deciLiter  oxyCODONE    IR 5 milliGRAM(s) Oral every 4 hours PRN moderate and severe pain        CAPILLARY BLOOD GLUCOSE      POCT Blood Glucose.: 308 mg/dL (08 Mar 2019 21:28)  POCT Blood Glucose.: 399 mg/dL (08 Mar 2019 17:20)  POCT Blood Glucose.: 315 mg/dL (08 Mar 2019 11:57)    I&O's Summary    08 Mar 2019 07:01  -  09 Mar 2019 07:00  --------------------------------------------------------  IN: 360 mL / OUT: 0 mL / NET: 360 mL        PHYSICAL EXAM  GENERAL: NAD, well-developed  HEAD:  Atraumatic, Normocephalic  EYES: EOMI, PERRLA, conjunctiva and sclera clear  NECK: Supple, No JVD  CHEST/LUNG: diminished breath sounds b/l  HEART: Regular rate and rhythm; No murmurs, rubs, or gallops  ABDOMEN: Soft, Nontender, Nondistended; Bowel sounds present  EXTREMITIES:  2+ Peripheral Pulses, No clubbing, cyanosis. +1 pitting edema  PSYCH: AAOx3  SKIN: No rashes or lesions    LABS:                        8.2    9.68  )-----------( 455      ( 09 Mar 2019 06:00 )             28.3     03-09    133<L>  |  94<L>  |  30<H>  ----------------------------<  211<H>  3.9   |  24  |  1.10    Ca    8.8      09 Mar 2019 06:00                RADIOLOGY & ADDITIONAL TESTS:    Imaging Personally Reviewed:  Consultant(s) Notes Reviewed:    Care Discussed with Consultants/Other Providers:

## 2019-03-09 NOTE — SWALLOW BEDSIDE ASSESSMENT ADULT - COMMENTS
Pulmonary Note 1/28/2019 - 63y Female hx of COPD presented initially with SBO, underwent ex lap 12/15 closed with 4 retention sutures, evicerated on 12/30 and closed with 6 more retention sutures, c/b multifocal pneumonia, developed hypercapnic respiratory failure requiring intubation 1/6, s/p bronch 1/9/18, improving and tolerating CPAP, still intubated, course further c/b Enterococci bacteremia. Transferred to MICU at request of patient/family.     As per Respiratory Note 3/9/2019: - high flow, wean as tolerated - on 25L/50% today and tolerating    Patient seen at bedside, alert and oriented state. Patient is able to follow simple commands and express simple needs. Patient reports that her voice is at baseline with no complaints given s/p extubations.   Patient on high flow AT 60%.    Baseline SPO2 is 100% prior to PO trials. Pulmonary Note 1/28/2019 - 63y Female hx of COPD presented initially with SBO, underwent ex lap 12/15 closed with 4 retention sutures, evicerated on 12/30 and closed with 6 more retention sutures, c/b multifocal pneumonia, developed hypercapnic respiratory failure requiring intubation 1/6, s/p bronch 1/9/18, improving and tolerating CPAP, still intubated, course further c/b Enterococci bacteremia. Transferred to MICU at request of patient/family.     As per Respiratory Note 3/9/2019: - high flow, wean as tolerated - on 25L/50% today and tolerating    Patient seen at bedside, alert and oriented state. Patient is able to follow simple commands and express simple needs. Patient reports that her voice is at baseline with no complaints given s/p extubations.   Patient on high flow AT 60%.    Baseline SPO2 is 100% prior to PO trials.    Chest x-ray on 3/6/2019: "IMPRESSION: Continued bilateral ill-defined reticular opacities, more confluent at the bases, not significantly changed allowing for different technique, which may be related to pulmonary edema. Infection is not excluded.  Possible trace right pleural effusion"

## 2019-03-09 NOTE — PROGRESS NOTE ADULT - PROBLEM SELECTOR PLAN 1
- prolonged intubation, now extubated to high flow/BIPAP  - BIPAP HS and PRN  - high flow, wean as tolerated - on 25L/50% today and tolerating  - cont sildenafil  - cont steroids  - chest PT - prolonged intubation, now extubated to high flow/BIPAP  - BIPAP HS and PRN  - high flow, wean as tolerated - on 25L/50% today and tolerating  - cont sildenafil  - cont steroids  - chest PT  -change lasix to PO 40 BID

## 2019-03-09 NOTE — SWALLOW BEDSIDE ASSESSMENT ADULT - SWALLOW EVAL: RECOMMENDED FEEDING/EATING TECHNIQUES
crush medication (when feasible)/no straws/position upright (90 degrees)/alternate food with liquid/maintain upright posture during/after eating for 30 mins/allow for swallow between intakes/oral hygiene
alternate food with liquid/allow for swallow between intakes/crush medication (when feasible)/position upright (90 degrees)/maintain upright posture during/after eating for 30 mins/no straws/oral hygiene

## 2019-03-10 LAB
ANION GAP SERPL CALC-SCNC: 16 MMO/L — HIGH (ref 7–14)
BACTERIA BLD CULT: SIGNIFICANT CHANGE UP
BASOPHILS # BLD AUTO: 0.08 K/UL — SIGNIFICANT CHANGE UP (ref 0–0.2)
BASOPHILS NFR BLD AUTO: 0.9 % — SIGNIFICANT CHANGE UP (ref 0–2)
BUN SERPL-MCNC: 31 MG/DL — HIGH (ref 7–23)
CALCIUM SERPL-MCNC: 9.2 MG/DL — SIGNIFICANT CHANGE UP (ref 8.4–10.5)
CHLORIDE SERPL-SCNC: 97 MMOL/L — LOW (ref 98–107)
CO2 SERPL-SCNC: 23 MMOL/L — SIGNIFICANT CHANGE UP (ref 22–31)
CREAT SERPL-MCNC: 1.05 MG/DL — SIGNIFICANT CHANGE UP (ref 0.5–1.3)
EOSINOPHIL # BLD AUTO: 0.17 K/UL — SIGNIFICANT CHANGE UP (ref 0–0.5)
EOSINOPHIL NFR BLD AUTO: 2 % — SIGNIFICANT CHANGE UP (ref 0–6)
GLUCOSE BLDC GLUCOMTR-MCNC: 147 MG/DL — HIGH (ref 70–99)
GLUCOSE BLDC GLUCOMTR-MCNC: 213 MG/DL — HIGH (ref 70–99)
GLUCOSE BLDC GLUCOMTR-MCNC: 249 MG/DL — HIGH (ref 70–99)
GLUCOSE BLDC GLUCOMTR-MCNC: 307 MG/DL — HIGH (ref 70–99)
GLUCOSE SERPL-MCNC: 174 MG/DL — HIGH (ref 70–99)
HCT VFR BLD CALC: 30.2 % — LOW (ref 34.5–45)
HGB BLD-MCNC: 8.7 G/DL — LOW (ref 11.5–15.5)
IMM GRANULOCYTES NFR BLD AUTO: 3.5 % — HIGH (ref 0–1.5)
LYMPHOCYTES # BLD AUTO: 2.58 K/UL — SIGNIFICANT CHANGE UP (ref 1–3.3)
LYMPHOCYTES # BLD AUTO: 29.9 % — SIGNIFICANT CHANGE UP (ref 13–44)
MCHC RBC-ENTMCNC: 27.6 PG — SIGNIFICANT CHANGE UP (ref 27–34)
MCHC RBC-ENTMCNC: 28.8 % — LOW (ref 32–36)
MCV RBC AUTO: 95.9 FL — SIGNIFICANT CHANGE UP (ref 80–100)
MONOCYTES # BLD AUTO: 1.31 K/UL — HIGH (ref 0–0.9)
MONOCYTES NFR BLD AUTO: 15.2 % — HIGH (ref 2–14)
NEUTROPHILS # BLD AUTO: 4.19 K/UL — SIGNIFICANT CHANGE UP (ref 1.8–7.4)
NEUTROPHILS NFR BLD AUTO: 48.5 % — SIGNIFICANT CHANGE UP (ref 43–77)
NRBC # FLD: 0.09 K/UL — LOW (ref 25–125)
NRBC FLD-RTO: 1 — SIGNIFICANT CHANGE UP
PLATELET # BLD AUTO: 427 K/UL — HIGH (ref 150–400)
PMV BLD: 9.3 FL — SIGNIFICANT CHANGE UP (ref 7–13)
POTASSIUM SERPL-MCNC: 4.3 MMOL/L — SIGNIFICANT CHANGE UP (ref 3.5–5.3)
POTASSIUM SERPL-SCNC: 4.3 MMOL/L — SIGNIFICANT CHANGE UP (ref 3.5–5.3)
RBC # BLD: 3.15 M/UL — LOW (ref 3.8–5.2)
RBC # FLD: 16.8 % — HIGH (ref 10.3–14.5)
SODIUM SERPL-SCNC: 136 MMOL/L — SIGNIFICANT CHANGE UP (ref 135–145)
WBC # BLD: 8.63 K/UL — SIGNIFICANT CHANGE UP (ref 3.8–10.5)
WBC # FLD AUTO: 8.63 K/UL — SIGNIFICANT CHANGE UP (ref 3.8–10.5)

## 2019-03-10 PROCEDURE — 99232 SBSQ HOSP IP/OBS MODERATE 35: CPT

## 2019-03-10 PROCEDURE — 99233 SBSQ HOSP IP/OBS HIGH 50: CPT

## 2019-03-10 RX ORDER — INSULIN LISPRO 100/ML
VIAL (ML) SUBCUTANEOUS
Qty: 0 | Refills: 0 | Status: DISCONTINUED | OUTPATIENT
Start: 2019-03-10 | End: 2019-03-20

## 2019-03-10 RX ORDER — POTASSIUM CHLORIDE 20 MEQ
40 PACKET (EA) ORAL DAILY
Qty: 0 | Refills: 0 | Status: DISCONTINUED | OUTPATIENT
Start: 2019-03-11 | End: 2019-03-19

## 2019-03-10 RX ORDER — INSULIN LISPRO 100/ML
VIAL (ML) SUBCUTANEOUS AT BEDTIME
Qty: 0 | Refills: 0 | Status: DISCONTINUED | OUTPATIENT
Start: 2019-03-10 | End: 2019-03-20

## 2019-03-10 RX ORDER — INSULIN LISPRO 100/ML
VIAL (ML) SUBCUTANEOUS
Qty: 0 | Refills: 0 | Status: DISCONTINUED | OUTPATIENT
Start: 2019-03-10 | End: 2019-03-10

## 2019-03-10 RX ORDER — INSULIN GLARGINE 100 [IU]/ML
28 INJECTION, SOLUTION SUBCUTANEOUS AT BEDTIME
Qty: 0 | Refills: 0 | Status: DISCONTINUED | OUTPATIENT
Start: 2019-03-10 | End: 2019-03-20

## 2019-03-10 RX ADMIN — Medication 20 MILLIGRAM(S): at 13:01

## 2019-03-10 RX ADMIN — Medication 40 MILLIGRAM(S): at 17:04

## 2019-03-10 RX ADMIN — Medication 4: at 17:05

## 2019-03-10 RX ADMIN — HEPARIN SODIUM 7500 UNIT(S): 5000 INJECTION INTRAVENOUS; SUBCUTANEOUS at 13:00

## 2019-03-10 RX ADMIN — CHLORHEXIDINE GLUCONATE 1 APPLICATION(S): 213 SOLUTION TOPICAL at 12:51

## 2019-03-10 RX ADMIN — INSULIN GLARGINE 28 UNIT(S): 100 INJECTION, SOLUTION SUBCUTANEOUS at 22:53

## 2019-03-10 RX ADMIN — GABAPENTIN 300 MILLIGRAM(S): 400 CAPSULE ORAL at 05:21

## 2019-03-10 RX ADMIN — OXYCODONE HYDROCHLORIDE 5 MILLIGRAM(S): 5 TABLET ORAL at 11:18

## 2019-03-10 RX ADMIN — PANTOPRAZOLE SODIUM 40 MILLIGRAM(S): 20 TABLET, DELAYED RELEASE ORAL at 05:24

## 2019-03-10 RX ADMIN — Medication 1 PACKET(S): at 12:51

## 2019-03-10 RX ADMIN — SIMVASTATIN 20 MILLIGRAM(S): 20 TABLET, FILM COATED ORAL at 23:08

## 2019-03-10 RX ADMIN — Medication 2: at 08:46

## 2019-03-10 RX ADMIN — Medication 500 MILLIGRAM(S): at 13:22

## 2019-03-10 RX ADMIN — Medication 20 MILLIGRAM(S): at 23:08

## 2019-03-10 RX ADMIN — Medication 9 UNIT(S): at 08:46

## 2019-03-10 RX ADMIN — Medication 4: at 12:52

## 2019-03-10 RX ADMIN — Medication 500 MILLIGRAM(S): at 23:08

## 2019-03-10 RX ADMIN — Medication 9 UNIT(S): at 12:52

## 2019-03-10 RX ADMIN — TRAMADOL HYDROCHLORIDE 50 MILLIGRAM(S): 50 TABLET ORAL at 18:23

## 2019-03-10 RX ADMIN — OXYCODONE HYDROCHLORIDE 5 MILLIGRAM(S): 5 TABLET ORAL at 10:48

## 2019-03-10 RX ADMIN — OXYCODONE HYDROCHLORIDE 5 MILLIGRAM(S): 5 TABLET ORAL at 17:04

## 2019-03-10 RX ADMIN — GABAPENTIN 300 MILLIGRAM(S): 400 CAPSULE ORAL at 23:08

## 2019-03-10 RX ADMIN — TRAMADOL HYDROCHLORIDE 50 MILLIGRAM(S): 50 TABLET ORAL at 18:53

## 2019-03-10 RX ADMIN — Medication 500 MILLIGRAM(S): at 12:52

## 2019-03-10 RX ADMIN — Medication 1 TABLET(S): at 17:04

## 2019-03-10 RX ADMIN — Medication 1 TABLET(S): at 12:52

## 2019-03-10 RX ADMIN — HEPARIN SODIUM 7500 UNIT(S): 5000 INJECTION INTRAVENOUS; SUBCUTANEOUS at 05:21

## 2019-03-10 RX ADMIN — Medication 10 MILLIGRAM(S): at 05:23

## 2019-03-10 RX ADMIN — Medication 9 UNIT(S): at 17:05

## 2019-03-10 RX ADMIN — Medication 1 APPLICATION(S): at 12:51

## 2019-03-10 RX ADMIN — Medication 500 MILLIGRAM(S): at 23:38

## 2019-03-10 RX ADMIN — Medication 20 MILLIGRAM(S): at 05:23

## 2019-03-10 RX ADMIN — GABAPENTIN 300 MILLIGRAM(S): 400 CAPSULE ORAL at 13:01

## 2019-03-10 RX ADMIN — Medication 1 TABLET(S): at 08:47

## 2019-03-10 RX ADMIN — Medication 137 MICROGRAM(S): at 05:23

## 2019-03-10 RX ADMIN — OXYCODONE HYDROCHLORIDE 5 MILLIGRAM(S): 5 TABLET ORAL at 17:34

## 2019-03-10 RX ADMIN — HEPARIN SODIUM 7500 UNIT(S): 5000 INJECTION INTRAVENOUS; SUBCUTANEOUS at 23:08

## 2019-03-10 RX ADMIN — Medication 40 MILLIGRAM(S): at 05:21

## 2019-03-10 RX ADMIN — FENTANYL CITRATE 1 PATCH: 50 INJECTION INTRAVENOUS at 20:43

## 2019-03-10 RX ADMIN — TRAMADOL HYDROCHLORIDE 50 MILLIGRAM(S): 50 TABLET ORAL at 05:24

## 2019-03-10 RX ADMIN — FENTANYL CITRATE 1 PATCH: 50 INJECTION INTRAVENOUS at 07:41

## 2019-03-10 NOTE — CHART NOTE - NSCHARTNOTEFT_GEN_A_CORE
endocrine chart note:      63 F with PMH of COPD (on home O2 (3L), hypothyroidism, T2DM (HbA1c 8s 11/2018, on insulin), RA on prednisone (10 mg daily x 30 years), HTN,  admitted for SBO s/p ex lap with lysis of adhesions, had been intubated for hypercapnic respiratory failure, now extubated, with increasing PO intake and resulting hyperglycemia          ·  Problem: Type 2 diabetes mellitus with hypoglycemia, with long-term current use of insulin.    Plan: BG elevated above goal of 140-180 mg/dl  increase lantus to 28 units QHS as fasting glucose was at goal (needed 2 units correction last night, so will increase Lantus slightly)  c/w lispro 9 units before meals    continue low dose humalog before meals at this time, and low dose bedtime scale also.   May need increase to moderate correctional scale if prandial readings remain elevated above 200 despite increase in premeal lispro as above    inform endocrine of hypoglycemia or persistent hyperglycemia episodes as changes in pts insulin regimen will need to be made.   notify endocrine if any plans to be NPO/diet changes as this will also affect insulin regimen.    DC plan TBD.  Patient was on insulin prior to hospitalization.  Would have RN's review insulin PEN administration with her and a family member with return demonstration.           Cristin Spicer MD  Pager [phone] ([facility])/ [phone] ([place]) [please provide 10 digit call back number]  Nights and weekends: [phone]  Please note that this patient may be followed by a different provider tomorrow. If no answer or after hours, please contact [phone].  For final dc reccomendations, please call [phone] or page the endocrine fellow on call.

## 2019-03-10 NOTE — PROGRESS NOTE ADULT - PROBLEM SELECTOR PLAN 1
- prolonged intubation, now extubated to high flow/BIPAP  - BIPAP HS and PRN  - high flow, wean as tolerated - on 25L/50% today and tolerating  - cont sildenafil  - cont steroids  - chest PT  -c/w  lasix to PO 40 BID

## 2019-03-10 NOTE — PROGRESS NOTE ADULT - SUBJECTIVE AND OBJECTIVE BOX
Patient is a 63y old  Female who presents with a chief complaint of Respiratory Failure (08 Mar 2019 13:03)        SUBJECTIVE / OVERNIGHT EVENTS: NO ON events       MEDICATIONS  (STANDING):  ascorbic acid Syrup 1000 milliGRAM(s) Oral once  chlorhexidine 4% Liquid 1 Application(s) Topical <User Schedule>  collagenase Ointment 1 Application(s) Topical daily  dextrose 5%. 1000 milliLiter(s) (50 mL/Hr) IV Continuous <Continuous>  dextrose 50% Injectable 12.5 Gram(s) IV Push once  dextrose 50% Injectable 25 Gram(s) IV Push once  dextrose 50% Injectable 25 Gram(s) IV Push once  fentaNYL   Patch  25 MICROgram(s)/Hr 1 Patch Transdermal every 72 hours  furosemide    Tablet 40 milliGRAM(s) Oral two times a day  gabapentin 300 milliGRAM(s) Oral three times a day  heparin  Injectable 7500 Unit(s) SubCutaneous every 8 hours  insulin glargine Injectable (LANTUS) 25 Unit(s) SubCutaneous at bedtime  insulin lispro (HumaLOG) corrective regimen sliding scale   SubCutaneous at bedtime  insulin lispro (HumaLOG) corrective regimen sliding scale   SubCutaneous three times a day before meals  insulin lispro Injectable (HumaLOG) 9 Unit(s) SubCutaneous three times a day before meals  lactobacillus acidophilus 1 Tablet(s) Oral three times a day with meals  levothyroxine 137 MICROGram(s) Oral <User Schedule>  naproxen 500 milliGRAM(s) Oral two times a day  pantoprazole    Tablet 40 milliGRAM(s) Oral before breakfast  predniSONE   Tablet 10 milliGRAM(s) Oral daily  psyllium Powder 1 Packet(s) Oral daily  sildenafil (REVATIO) 20 milliGRAM(s) Oral three times a day  simvastatin 20 milliGRAM(s) Oral at bedtime  traMADol 50 milliGRAM(s) Oral two times a day    MEDICATIONS  (PRN):  acetaminophen   Tablet .. 650 milliGRAM(s) Oral every 6 hours PRN Temp greater or equal to 38C (100.4F), Moderate Pain (4 - 6)  dextrose 40% Gel 15 Gram(s) Oral once PRN Blood Glucose LESS THAN 70 milliGRAM(s)/deciLiter  glucagon  Injectable 1 milliGRAM(s) IntraMuscular once PRN Glucose <70 milliGRAM(s)/deciLiter  oxyCODONE    IR 5 milliGRAM(s) Oral every 4 hours PRN moderate and severe pain        CAPILLARY BLOOD GLUCOSE      POCT Blood Glucose.: 213 mg/dL (09 Mar 2019 21:48)  POCT Blood Glucose.: 302 mg/dL (09 Mar 2019 16:55)  POCT Blood Glucose.: 339 mg/dL (09 Mar 2019 11:49)  POCT Blood Glucose.: 375 mg/dL (09 Mar 2019 09:41)  POCT Blood Glucose.: 299 mg/dL (09 Mar 2019 08:25)    I&O's Summary      PHYSICAL EXAM  GENERAL: NAD, well-developed  HEAD:  Atraumatic, Normocephalic  EYES: EOMI, PERRLA, conjunctiva and sclera clear  NECK: Supple, No JVD  CHEST/LUNG: Clear to auscultation bilaterally; No wheeze  HEART: Regular rate and rhythm; No murmurs, rubs, or gallops  ABDOMEN: Soft, Nontender, Nondistended; Bowel sounds present  EXTREMITIES:  2+ Peripheral Pulses, No clubbing, cyanosis, or edema  PSYCH: AAOx3  SKIN: No rashes or lesions    LABS:                        8.7    8.63  )-----------( 427      ( 10 Mar 2019 05:25 )             30.2     03-10    136  |  97<L>  |  31<H>  ----------------------------<  174<H>  4.3   |  23  |  1.05    Ca    9.2      10 Mar 2019 05:25                RADIOLOGY & ADDITIONAL TESTS:    Imaging Personally Reviewed:  Consultant(s) Notes Reviewed:    Care Discussed with Consultants/Other Providers:

## 2019-03-10 NOTE — PROGRESS NOTE ADULT - PROBLEM SELECTOR PLAN 7
- SBO s/p ex-lap with findings of healthy bowel c/b evisceration of bowel requiring placement of retention sutures  - sx evaluated wound - retention sutures removed   - cont dressing to wound change 2x day  -c/w naproxen and gabapentin  -change fentanyl patch to 25mcg

## 2019-03-11 LAB
BACTERIA BLD CULT: SIGNIFICANT CHANGE UP
GLUCOSE BLDC GLUCOMTR-MCNC: 126 MG/DL — HIGH (ref 70–99)
GLUCOSE BLDC GLUCOMTR-MCNC: 208 MG/DL — HIGH (ref 70–99)
GLUCOSE BLDC GLUCOMTR-MCNC: 212 MG/DL — HIGH (ref 70–99)
GLUCOSE BLDC GLUCOMTR-MCNC: 90 MG/DL — SIGNIFICANT CHANGE UP (ref 70–99)

## 2019-03-11 PROCEDURE — 99232 SBSQ HOSP IP/OBS MODERATE 35: CPT

## 2019-03-11 PROCEDURE — 99233 SBSQ HOSP IP/OBS HIGH 50: CPT | Mod: GC

## 2019-03-11 RX ORDER — INSULIN LISPRO 100/ML
11 VIAL (ML) SUBCUTANEOUS
Qty: 0 | Refills: 0 | Status: DISCONTINUED | OUTPATIENT
Start: 2019-03-11 | End: 2019-03-13

## 2019-03-11 RX ADMIN — Medication 20 MILLIGRAM(S): at 07:05

## 2019-03-11 RX ADMIN — Medication 9 UNIT(S): at 08:35

## 2019-03-11 RX ADMIN — Medication 20 MILLIGRAM(S): at 21:28

## 2019-03-11 RX ADMIN — Medication 40 MILLIGRAM(S): at 07:05

## 2019-03-11 RX ADMIN — Medication 4: at 17:41

## 2019-03-11 RX ADMIN — OXYCODONE HYDROCHLORIDE 5 MILLIGRAM(S): 5 TABLET ORAL at 12:27

## 2019-03-11 RX ADMIN — OXYCODONE HYDROCHLORIDE 5 MILLIGRAM(S): 5 TABLET ORAL at 01:25

## 2019-03-11 RX ADMIN — OXYCODONE HYDROCHLORIDE 5 MILLIGRAM(S): 5 TABLET ORAL at 00:55

## 2019-03-11 RX ADMIN — Medication 1 APPLICATION(S): at 11:57

## 2019-03-11 RX ADMIN — Medication 20 MILLIGRAM(S): at 13:09

## 2019-03-11 RX ADMIN — Medication 1 TABLET(S): at 17:42

## 2019-03-11 RX ADMIN — Medication 1 TABLET(S): at 11:57

## 2019-03-11 RX ADMIN — Medication 40 MILLIEQUIVALENT(S): at 11:57

## 2019-03-11 RX ADMIN — FENTANYL CITRATE 1 PATCH: 50 INJECTION INTRAVENOUS at 19:46

## 2019-03-11 RX ADMIN — OXYCODONE HYDROCHLORIDE 5 MILLIGRAM(S): 5 TABLET ORAL at 11:57

## 2019-03-11 RX ADMIN — Medication 137 MICROGRAM(S): at 07:05

## 2019-03-11 RX ADMIN — Medication 40 MILLIGRAM(S): at 17:42

## 2019-03-11 RX ADMIN — Medication 1 PACKET(S): at 11:57

## 2019-03-11 RX ADMIN — Medication 500 MILLIGRAM(S): at 11:56

## 2019-03-11 RX ADMIN — Medication 9 UNIT(S): at 13:09

## 2019-03-11 RX ADMIN — Medication 11 UNIT(S): at 17:40

## 2019-03-11 RX ADMIN — TRAMADOL HYDROCHLORIDE 50 MILLIGRAM(S): 50 TABLET ORAL at 08:35

## 2019-03-11 RX ADMIN — HEPARIN SODIUM 7500 UNIT(S): 5000 INJECTION INTRAVENOUS; SUBCUTANEOUS at 13:10

## 2019-03-11 RX ADMIN — SIMVASTATIN 20 MILLIGRAM(S): 20 TABLET, FILM COATED ORAL at 21:28

## 2019-03-11 RX ADMIN — TRAMADOL HYDROCHLORIDE 50 MILLIGRAM(S): 50 TABLET ORAL at 17:41

## 2019-03-11 RX ADMIN — Medication 10 MILLIGRAM(S): at 07:05

## 2019-03-11 RX ADMIN — Medication 4: at 13:09

## 2019-03-11 RX ADMIN — FENTANYL CITRATE 1 PATCH: 50 INJECTION INTRAVENOUS at 07:00

## 2019-03-11 RX ADMIN — GABAPENTIN 300 MILLIGRAM(S): 400 CAPSULE ORAL at 13:09

## 2019-03-11 RX ADMIN — TRAMADOL HYDROCHLORIDE 50 MILLIGRAM(S): 50 TABLET ORAL at 08:05

## 2019-03-11 RX ADMIN — OXYCODONE HYDROCHLORIDE 5 MILLIGRAM(S): 5 TABLET ORAL at 21:27

## 2019-03-11 RX ADMIN — CHLORHEXIDINE GLUCONATE 1 APPLICATION(S): 213 SOLUTION TOPICAL at 11:57

## 2019-03-11 RX ADMIN — INSULIN GLARGINE 28 UNIT(S): 100 INJECTION, SOLUTION SUBCUTANEOUS at 21:28

## 2019-03-11 RX ADMIN — PANTOPRAZOLE SODIUM 40 MILLIGRAM(S): 20 TABLET, DELAYED RELEASE ORAL at 07:05

## 2019-03-11 RX ADMIN — Medication 1 TABLET(S): at 08:05

## 2019-03-11 RX ADMIN — GABAPENTIN 300 MILLIGRAM(S): 400 CAPSULE ORAL at 21:28

## 2019-03-11 RX ADMIN — GABAPENTIN 300 MILLIGRAM(S): 400 CAPSULE ORAL at 07:05

## 2019-03-11 NOTE — PROGRESS NOTE ADULT - SUBJECTIVE AND OBJECTIVE BOX
CHIEF COMPLAINT:    Interval Events:    REVIEW OF SYSTEMS:  Constitutional:   Eyes:  ENT:  CV:  Resp:  GI:  :  MSK:  Integumentary:  Neurological:  Psychiatric:  Endocrine:  Hematologic/Lymphatic:  Allergic/Immunologic:  [ ] All other systems negative  [ ] Unable to assess ROS because ________    OBJECTIVE:  ICU Vital Signs Last 24 Hrs  T(C): 36.8 (11 Mar 2019 06:36), Max: 37.1 (10 Mar 2019 12:58)  T(F): 98.3 (11 Mar 2019 06:36), Max: 98.8 (10 Mar 2019 12:58)  HR: 98 (11 Mar 2019 06:36) (87 - 105)  BP: 146/88 (11 Mar 2019 06:36) (114/67 - 146/88)  BP(mean): --  ABP: --  ABP(mean): --  RR: 19 (11 Mar 2019 06:36) (18 - 20)  SpO2: 100% (11 Mar 2019 06:36) (98% - 100%)        CAPILLARY BLOOD GLUCOSE      POCT Blood Glucose.: 126 mg/dL (11 Mar 2019 07:56)      PHYSICAL EXAM:  General:   HEENT:   Lymph Nodes:  Neck:   Respiratory:   Cardiovascular:   Abdomen:   Extremities:   Skin:   Neurological:  Psychiatry:    HOSPITAL MEDICATIONS:  MEDICATIONS  (STANDING):  ascorbic acid Syrup 1000 milliGRAM(s) Oral once  chlorhexidine 4% Liquid 1 Application(s) Topical <User Schedule>  collagenase Ointment 1 Application(s) Topical daily  dextrose 5%. 1000 milliLiter(s) (50 mL/Hr) IV Continuous <Continuous>  dextrose 50% Injectable 12.5 Gram(s) IV Push once  dextrose 50% Injectable 25 Gram(s) IV Push once  dextrose 50% Injectable 25 Gram(s) IV Push once  fentaNYL   Patch  25 MICROgram(s)/Hr 1 Patch Transdermal every 72 hours  furosemide    Tablet 40 milliGRAM(s) Oral two times a day  gabapentin 300 milliGRAM(s) Oral three times a day  heparin  Injectable 7500 Unit(s) SubCutaneous every 8 hours  insulin glargine Injectable (LANTUS) 28 Unit(s) SubCutaneous at bedtime  insulin lispro (HumaLOG) corrective regimen sliding scale   SubCutaneous three times a day before meals  insulin lispro (HumaLOG) corrective regimen sliding scale   SubCutaneous at bedtime  insulin lispro Injectable (HumaLOG) 9 Unit(s) SubCutaneous three times a day before meals  lactobacillus acidophilus 1 Tablet(s) Oral three times a day with meals  levothyroxine 137 MICROGram(s) Oral <User Schedule>  naproxen 500 milliGRAM(s) Oral two times a day  pantoprazole    Tablet 40 milliGRAM(s) Oral before breakfast  potassium chloride   Powder 40 milliEquivalent(s) Oral daily  predniSONE   Tablet 10 milliGRAM(s) Oral daily  psyllium Powder 1 Packet(s) Oral daily  sildenafil (REVATIO) 20 milliGRAM(s) Oral three times a day  simvastatin 20 milliGRAM(s) Oral at bedtime  traMADol 50 milliGRAM(s) Oral two times a day    MEDICATIONS  (PRN):  acetaminophen   Tablet .. 650 milliGRAM(s) Oral every 6 hours PRN Temp greater or equal to 38C (100.4F), Moderate Pain (4 - 6)  dextrose 40% Gel 15 Gram(s) Oral once PRN Blood Glucose LESS THAN 70 milliGRAM(s)/deciLiter  glucagon  Injectable 1 milliGRAM(s) IntraMuscular once PRN Glucose <70 milliGRAM(s)/deciLiter  oxyCODONE    IR 5 milliGRAM(s) Oral every 4 hours PRN moderate and severe pain      LABS:                        8.7    8.63  )-----------( 427      ( 10 Mar 2019 05:25 )             30.2     03-10    136  |  97<L>  |  31<H>  ----------------------------<  174<H>  4.3   |  23  |  1.05    Ca    9.2      10 Mar 2019 05:25                MICROBIOLOGY:     RADIOLOGY:  [ ] Reviewed and interpreted by me    PULMONARY FUNCTION TESTS:    EKG: CHIEF COMPLAINT: Patient is a 63y old  Female who presents with a chief complaint of Patient is a 63y old  Female who presents with a chief complaint of Respiratory Failure (08 Mar 2019 13:03) (11 Mar 2019 08:51)      Interval Events: none     REVIEW OF SYSTEMS:  Constitutional: No fever or chills.  Asking for PT and OOB to chair   CV: Denies   Resp: + COREAS   GI: Denies   MSK: Weakness   [x ] All other systems negative  [ ] Unable to assess ROS because ________    OBJECTIVE:  ICU Vital Signs Last 24 Hrs  T(C): 36.8 (11 Mar 2019 06:36), Max: 37.1 (10 Mar 2019 12:58)  T(F): 98.3 (11 Mar 2019 06:36), Max: 98.8 (10 Mar 2019 12:58)  HR: 98 (11 Mar 2019 06:36) (87 - 105)  BP: 146/88 (11 Mar 2019 06:36) (114/67 - 146/88)  BP(mean): --  ABP: --  ABP(mean): --  RR: 19 (11 Mar 2019 06:36) (18 - 20)  SpO2: 100% (11 Mar 2019 06:36) (98% - 100%)        CAPILLARY BLOOD GLUCOSE      POCT Blood Glucose.: 126 mg/dL (11 Mar 2019 07:56)        HOSPITAL MEDICATIONS:  MEDICATIONS  (STANDING):  ascorbic acid Syrup 1000 milliGRAM(s) Oral once  chlorhexidine 4% Liquid 1 Application(s) Topical <User Schedule>  collagenase Ointment 1 Application(s) Topical daily  dextrose 5%. 1000 milliLiter(s) (50 mL/Hr) IV Continuous <Continuous>  dextrose 50% Injectable 12.5 Gram(s) IV Push once  dextrose 50% Injectable 25 Gram(s) IV Push once  dextrose 50% Injectable 25 Gram(s) IV Push once  fentaNYL   Patch  25 MICROgram(s)/Hr 1 Patch Transdermal every 72 hours  furosemide    Tablet 40 milliGRAM(s) Oral two times a day  gabapentin 300 milliGRAM(s) Oral three times a day  heparin  Injectable 7500 Unit(s) SubCutaneous every 8 hours  insulin glargine Injectable (LANTUS) 28 Unit(s) SubCutaneous at bedtime  insulin lispro (HumaLOG) corrective regimen sliding scale   SubCutaneous three times a day before meals  insulin lispro (HumaLOG) corrective regimen sliding scale   SubCutaneous at bedtime  insulin lispro Injectable (HumaLOG) 9 Unit(s) SubCutaneous three times a day before meals  lactobacillus acidophilus 1 Tablet(s) Oral three times a day with meals  levothyroxine 137 MICROGram(s) Oral <User Schedule>  naproxen 500 milliGRAM(s) Oral two times a day  pantoprazole    Tablet 40 milliGRAM(s) Oral before breakfast  potassium chloride   Powder 40 milliEquivalent(s) Oral daily  predniSONE   Tablet 10 milliGRAM(s) Oral daily  psyllium Powder 1 Packet(s) Oral daily  sildenafil (REVATIO) 20 milliGRAM(s) Oral three times a day  simvastatin 20 milliGRAM(s) Oral at bedtime  traMADol 50 milliGRAM(s) Oral two times a day    MEDICATIONS  (PRN):  acetaminophen   Tablet .. 650 milliGRAM(s) Oral every 6 hours PRN Temp greater or equal to 38C (100.4F), Moderate Pain (4 - 6)  dextrose 40% Gel 15 Gram(s) Oral once PRN Blood Glucose LESS THAN 70 milliGRAM(s)/deciLiter  glucagon  Injectable 1 milliGRAM(s) IntraMuscular once PRN Glucose <70 milliGRAM(s)/deciLiter  oxyCODONE    IR 5 milliGRAM(s) Oral every 4 hours PRN moderate and severe pain      LABS:                        8.7    8.63  )-----------( 427      ( 10 Mar 2019 05:25 )             30.2     03-10    136  |  97<L>  |  31<H>  ----------------------------<  174<H>  4.3   |  23  |  1.05    Ca    9.2      10 Mar 2019 05:25                MICROBIOLOGY:     RADIOLOGY:  [ ] Reviewed and interpreted by me    PULMONARY FUNCTION TESTS:    EKG:

## 2019-03-11 NOTE — PROGRESS NOTE ADULT - SUBJECTIVE AND OBJECTIVE BOX
Chief Complaint/Follow-up on: Dm    Subjective: appetite improved, no hypoglycemia.        MEDICATIONS  (STANDING):  ascorbic acid Syrup 1000 milliGRAM(s) Oral once  chlorhexidine 4% Liquid 1 Application(s) Topical <User Schedule>  collagenase Ointment 1 Application(s) Topical daily  dextrose 5%. 1000 milliLiter(s) (50 mL/Hr) IV Continuous <Continuous>  dextrose 50% Injectable 12.5 Gram(s) IV Push once  dextrose 50% Injectable 25 Gram(s) IV Push once  dextrose 50% Injectable 25 Gram(s) IV Push once  fentaNYL   Patch  50 MICROgram(s)/Hr 1 Patch Transdermal every 72 hours  furosemide   Injectable 40 milliGRAM(s) IV Push daily  gabapentin 300 milliGRAM(s) Oral three times a day  heparin  Injectable 7500 Unit(s) SubCutaneous every 8 hours  insulin glargine Injectable (LANTUS) 23 Unit(s) SubCutaneous at bedtime  insulin lispro (HumaLOG) corrective regimen sliding scale   SubCutaneous at bedtime  insulin lispro (HumaLOG) corrective regimen sliding scale   SubCutaneous three times a day before meals  insulin lispro Injectable (HumaLOG) 7 Unit(s) SubCutaneous three times a day before meals  lactobacillus acidophilus 1 Tablet(s) Oral three times a day with meals  levothyroxine 137 MICROGram(s) Oral <User Schedule>  methylPREDNISolone sodium succinate Injectable 10 milliGRAM(s) IV Push daily  naproxen 500 milliGRAM(s) Oral two times a day  pantoprazole    Tablet 40 milliGRAM(s) Oral before breakfast  psyllium Powder 1 Packet(s) Oral daily  sildenafil (REVATIO) 20 milliGRAM(s) Oral three times a day  simvastatin 20 milliGRAM(s) Oral at bedtime  traMADol 50 milliGRAM(s) Oral two times a day    MEDICATIONS  (PRN):  acetaminophen   Tablet .. 650 milliGRAM(s) Oral every 6 hours PRN Temp greater or equal to 38C (100.4F), Moderate Pain (4 - 6)  dextrose 40% Gel 15 Gram(s) Oral once PRN Blood Glucose LESS THAN 70 milliGRAM(s)/deciLiter  glucagon  Injectable 1 milliGRAM(s) IntraMuscular once PRN Glucose <70 milliGRAM(s)/deciLiter  oxyCODONE    IR 5 milliGRAM(s) Oral every 4 hours PRN moderate and severe pain      PHYSICAL EXAM:  Vital Signs Last 24 Hrs  T(C): 36.8 (11 Mar 2019 06:36), Max: 37.1 (10 Mar 2019 12:58)  T(F): 98.3 (11 Mar 2019 06:36), Max: 98.8 (10 Mar 2019 12:58)  HR: 98 (11 Mar 2019 06:36) (87 - 105)  BP: 146/88 (11 Mar 2019 06:36) (114/67 - 146/88)  BP(mean): --  RR: 19 (11 Mar 2019 06:36) (18 - 20)  SpO2: 100% (11 Mar 2019 06:36) (98% - 100%)  GENERAL: NAD,  well-developed  EYES: No proptosis, no injection  HEENT:  Atraumatic, Normocephalic, moist mucous membranes  GI: Soft, nontender, non distended, normal bowel sounds      CAPILLARY BLOOD GLUCOSE      POCT Blood Glucose.: 126 mg/dL (11 Mar 2019 07:56)  POCT Blood Glucose.: 147 mg/dL (10 Mar 2019 21:38)  POCT Blood Glucose.: 213 mg/dL (10 Mar 2019 16:54)    POCT Blood Glucose.: 375 mg/dL (03-09-19 @ 09:41)  POCT Blood Glucose.: 299 mg/dL (03-09-19 @ 08:25)  POCT Blood Glucose.: 308 mg/dL (03-08-19 @ 21:28)  POCT Blood Glucose.: 399 mg/dL (03-08-19 @ 17:20)  POCT Blood Glucose.: 315 mg/dL (03-08-19 @ 11:57)  POCT Blood Glucose.: 183 mg/dL (03-08-19 @ 07:45)  POCT Blood Glucose.: 271 mg/dL (03-07-19 @ 22:16)  POCT Blood Glucose.: 314 mg/dL (03-07-19 @ 17:00)  POCT Blood Glucose.: 337 mg/dL (03-07-19 @ 11:41)  POCT Blood Glucose.: 334 mg/dL (03-07-19 @ 07:57)  POCT Blood Glucose.: 397 mg/dL (03-06-19 @ 21:20)  POCT Blood Glucose.: 372 mg/dL (03-06-19 @ 17:08)  POCT Blood Glucose.: 385 mg/dL (03-06-19 @ 16:58)  POCT Blood Glucose.: 325 mg/dL (03-06-19 @ 11:56)    03-09    133<L>  |  94<L>  |  30<H>  ----------------------------<  211<H>  3.9   |  24  |  1.10    EGFR if : 63  EGFR if non : 54    Ca    8.8      03-09    TPro  6.9  /  Alb  2.9<L>  /  TBili  0.3  /  DBili  x   /  AST  16  /  ALT  16  /  AlkPhos  104  03-07          Thyroid Function Tests:      Hemoglobin A1C, Whole Blood: 7.1 % <H> [4.0 - 5.6] (12-17-18 @ 03:50)  Hemoglobin A1C, Whole Blood: 7.3 % <H> [4.0 - 5.6] (12-16-18 @ 04:17)

## 2019-03-11 NOTE — PROGRESS NOTE ADULT - PROBLEM SELECTOR PLAN 9
- family meeting held - Pt to make decision regarding trach/feeding tube when dtr arrives and will let us know her wishes - Pulm fellow spoke with patient who does not wish to be resuscitated or have trach.  Paperwork reviewed with pt by Rupal Pulmonology Fellow

## 2019-03-11 NOTE — CHART NOTE - NSCHARTNOTEFT_GEN_A_CORE
Spoke with patient in regards to reintubation and subsequent tracheostomy. D/w patient procedure, what it would entail and what to expect once tracheostomy is inserted. Pt expressed that she does not want to have a tracheostomy with the understanding that if her breathing were to become much worse she would . She would like to discuss this with her children.     ----------------------------------------  Rupal Barrera MD PGY-5  Pulmonary/Critical Care Fellow  Pager # [phone] ([facility]), [phone] ([facility]) Spoke with patient in regards to reintubation and subsequent tracheostomy. D/w patient procedure, what it would entail and what to expect once tracheostomy is inserted. Pt expressed that she does not want to have a tracheostomy with the understanding that if her breathing were to become much worse she would . She would like to discuss this with her children but for now would not like to be reintubated/trach.     ----------------------------------------  Rupal Barrera MD PGY-5  Pulmonary/Critical Care Fellow  Pager # [phone] ([facility]), [phone] ([facility]) Spoke with patient in regards to reintubation and subsequent tracheostomy. D/w patient procedure, what it would entail and what to expect once tracheostomy is inserted. Pt expressed that she does not want to have a tracheostomy with the understanding that if her breathing were to become much worse she would . She would like to discuss this with her children but for now would not like to be reintubated/trach.     Addendum 5:40PM   Spoke with son Norris, who states that he and his sister would make the "final decision" if the patient were to decompensate. Even though the patient would not like a tracheostomy, in the event she could not make her own healthcare decisions, she understood that her HCP could decide to place a tracheostomy.     ----------------------------------------  Rupal Barrera MD PGY-5  Pulmonary/Critical Care Fellow  Pager # [phone] ([facility]), [phone] ([facility])

## 2019-03-11 NOTE — PROGRESS NOTE ADULT - PROBLEM SELECTOR PLAN 1
- prolonged intubation, now extubated to high flow/BIPAP  - BIPAP HS and PRN  - high flow, wean as tolerated - on 25L/50% today and tolerating  - cont sildenafil  - cont steroids  - chest PT  -c/w  lasix to PO 40 BID - prolonged intubation, now extubated to high flow/BIPAP  - BIPAP HS and PRN  - high flow, wean as tolerated - on 25L/35% today and tolerating  - cont sildenafil  - cont steroids  - chest PT  -c/w  lasix to PO 40 BID

## 2019-03-11 NOTE — PROGRESS NOTE ADULT - PROBLEM SELECTOR PLAN 1
BG elevated above goal of 140-180 mg/dl  increase lantus to 25 units QHS as fasting glucose was at goal (needed 2 units correction last night, so will increase Lantus slightly)  c/w humalog to 7 units sq tid ac for prandial hyperglycemia, if glucose above 200 for two or more FSBG please increase to 9 units with meals    continue low dose humalog before meals at this time, and low dose bedtime scale also.   May need increase to moderate correctional scale if prandial readings remain elevated above 200 despite increase in premeal lispro as above    inform endocrine of hypoglycemia or persistent hyperglycemia episodes as changes in pts insulin regimen will need to be made.   notify endocrine if any plans to be NPO/diet changes as this will also affect insulin regimen.    DC plan TBD.  Patient was on insulin prior to hospitalization.  Would have RN's review insulin PEN administration with her and a family member with return demonstration. BG elevated above goal of 140-180 mg/dl  c/w lantus to 25 units QHS  increase humalog to 11 units sq tid ac for prandial hyperglycemia,   continue current humalog correction scale ac and hs    inform endocrine of hypoglycemia or persistent hyperglycemia episodes as changes in pts insulin regimen will need to be made.   notify endocrine if any plans to be NPO/diet changes as this will also affect insulin regimen.    DC plan TBD.  Patient was on insulin prior to hospitalization.  Would have RN's review insulin PEN administration with her and a family member with return demonstration.

## 2019-03-11 NOTE — PROGRESS NOTE ADULT - REASON FOR ADMISSION
Respiratory Failure
abd pain
pain
Abdominal Pain
F/up bacteremia
Hypercarbic respiratory failure
Hypoxic respiratory failure.
Respiratory monitoring
Respiratory monitoring
SBO
SBO, respiratory failure
abd pain
abd pain
abdominal pain
abdominal pain
bowel obs
resp failure
sbo
sbo
small bowel resection with complications
SBO
abd pain
ex lap for sbo f/b ex lap for evisceration c/b viral pneumonia
hypoxic respiratory failure
Abdominal pain
Respiratory Failure
SBO
respiratory failure
sbo
Patient is a 63y old  Female who presents with a chief complaint of Respiratory Failure (08 Mar 2019 13:03)
Respiratory failure in the setting of multifocal PNA
sbo
Patient is a 63y old  Female who presents with a chief complaint of sbo (04 Mar 2019 18:00)

## 2019-03-11 NOTE — PROGRESS NOTE ADULT - ATTENDING COMMENTS
looks and feels better  decreasing fio2 requirements  OOB to chair today, PT, titrate down fio2  bipap qhs and prn  d/c plan to [facility]  if decompenstates, want reintub and trach looks and feels better  decreasing fio2 requirements  OOB to chair today, PT, titrate down fio2  bipap qhs and prn  d/c plan to [facility]

## 2019-03-12 LAB
GLUCOSE BLDC GLUCOMTR-MCNC: 122 MG/DL — HIGH (ref 70–99)
GLUCOSE BLDC GLUCOMTR-MCNC: 170 MG/DL — HIGH (ref 70–99)
GLUCOSE BLDC GLUCOMTR-MCNC: 174 MG/DL — HIGH (ref 70–99)
GLUCOSE BLDC GLUCOMTR-MCNC: 222 MG/DL — HIGH (ref 70–99)
GLUCOSE BLDC GLUCOMTR-MCNC: 300 MG/DL — HIGH (ref 70–99)
GLUCOSE BLDC GLUCOMTR-MCNC: 76 MG/DL — SIGNIFICANT CHANGE UP (ref 70–99)

## 2019-03-12 PROCEDURE — 99233 SBSQ HOSP IP/OBS HIGH 50: CPT | Mod: GC

## 2019-03-12 RX ADMIN — Medication 6: at 11:59

## 2019-03-12 RX ADMIN — OXYCODONE HYDROCHLORIDE 5 MILLIGRAM(S): 5 TABLET ORAL at 12:10

## 2019-03-12 RX ADMIN — FENTANYL CITRATE 1 PATCH: 50 INJECTION INTRAVENOUS at 12:13

## 2019-03-12 RX ADMIN — Medication 40 MILLIEQUIVALENT(S): at 11:58

## 2019-03-12 RX ADMIN — Medication 2: at 17:35

## 2019-03-12 RX ADMIN — Medication 1 TABLET(S): at 17:35

## 2019-03-12 RX ADMIN — OXYCODONE HYDROCHLORIDE 5 MILLIGRAM(S): 5 TABLET ORAL at 22:35

## 2019-03-12 RX ADMIN — Medication 1 TABLET(S): at 09:10

## 2019-03-12 RX ADMIN — Medication 1 TABLET(S): at 11:59

## 2019-03-12 RX ADMIN — Medication 20 MILLIGRAM(S): at 22:35

## 2019-03-12 RX ADMIN — Medication 11 UNIT(S): at 09:12

## 2019-03-12 RX ADMIN — HEPARIN SODIUM 7500 UNIT(S): 5000 INJECTION INTRAVENOUS; SUBCUTANEOUS at 05:23

## 2019-03-12 RX ADMIN — INSULIN GLARGINE 28 UNIT(S): 100 INJECTION, SOLUTION SUBCUTANEOUS at 22:59

## 2019-03-12 RX ADMIN — Medication 500 MILLIGRAM(S): at 23:50

## 2019-03-12 RX ADMIN — OXYCODONE HYDROCHLORIDE 5 MILLIGRAM(S): 5 TABLET ORAL at 23:05

## 2019-03-12 RX ADMIN — TRAMADOL HYDROCHLORIDE 50 MILLIGRAM(S): 50 TABLET ORAL at 05:24

## 2019-03-12 RX ADMIN — Medication 1 PACKET(S): at 11:58

## 2019-03-12 RX ADMIN — Medication 137 MICROGRAM(S): at 05:23

## 2019-03-12 RX ADMIN — Medication 20 MILLIGRAM(S): at 05:23

## 2019-03-12 RX ADMIN — CHLORHEXIDINE GLUCONATE 1 APPLICATION(S): 213 SOLUTION TOPICAL at 09:09

## 2019-03-12 RX ADMIN — Medication 20 MILLIGRAM(S): at 14:50

## 2019-03-12 RX ADMIN — Medication 11 UNIT(S): at 17:35

## 2019-03-12 RX ADMIN — PANTOPRAZOLE SODIUM 40 MILLIGRAM(S): 20 TABLET, DELAYED RELEASE ORAL at 05:24

## 2019-03-12 RX ADMIN — TRAMADOL HYDROCHLORIDE 50 MILLIGRAM(S): 50 TABLET ORAL at 17:35

## 2019-03-12 RX ADMIN — FENTANYL CITRATE 1 PATCH: 50 INJECTION INTRAVENOUS at 19:45

## 2019-03-12 RX ADMIN — OXYCODONE HYDROCHLORIDE 5 MILLIGRAM(S): 5 TABLET ORAL at 12:40

## 2019-03-12 RX ADMIN — Medication 40 MILLIGRAM(S): at 17:35

## 2019-03-12 RX ADMIN — FENTANYL CITRATE 1 PATCH: 50 INJECTION INTRAVENOUS at 07:00

## 2019-03-12 RX ADMIN — SIMVASTATIN 20 MILLIGRAM(S): 20 TABLET, FILM COATED ORAL at 22:35

## 2019-03-12 RX ADMIN — Medication 1 APPLICATION(S): at 12:00

## 2019-03-12 RX ADMIN — FENTANYL CITRATE 1 PATCH: 50 INJECTION INTRAVENOUS at 12:10

## 2019-03-12 RX ADMIN — Medication 10 MILLIGRAM(S): at 05:24

## 2019-03-12 RX ADMIN — GABAPENTIN 300 MILLIGRAM(S): 400 CAPSULE ORAL at 05:23

## 2019-03-12 RX ADMIN — GABAPENTIN 300 MILLIGRAM(S): 400 CAPSULE ORAL at 22:35

## 2019-03-12 RX ADMIN — Medication 40 MILLIGRAM(S): at 05:22

## 2019-03-12 RX ADMIN — GABAPENTIN 300 MILLIGRAM(S): 400 CAPSULE ORAL at 14:50

## 2019-03-12 RX ADMIN — Medication 11 UNIT(S): at 11:58

## 2019-03-12 RX ADMIN — Medication 4: at 09:11

## 2019-03-12 RX ADMIN — Medication 500 MILLIGRAM(S): at 11:58

## 2019-03-12 NOTE — PROGRESS NOTE ADULT - NSICDXPROBLEM_GEN_ALL_CORE_FT
PROBLEM DIAGNOSES  Problem: Acute respiratory failure  Assessment and Plan: Highflow titrated down to nasal cannula today and tolerating   Bipap at hs   Continue sildenafil   Chest PT   OOB    Lasix bid     Problem: Bacteremia  Assessment and Plan: Clutures cleared   off abx     Problem: Fever  Assessment and Plan: Pt had fever 3/6 cx sent no GTF  no further fevers   no abx     Problem: Type 2 diabetes mellitus  Assessment and Plan: cont basil /premeal insulin   Endo recs noted       Problem: Rheumatoid arthritis  Assessment and Plan: Continues on home dose of prednisone 10mg daiy   PT for mobility     Problem: Conflicted attitude towards treatment  Assessment and Plan: Pt requesting to be DNR does not want vent or trach   Spoke to [place] pt health care proxy who validates pts wishes   DNR on chart     Problem: SBO (small bowel obstruction)  Assessment and Plan: Abd dressing 2x daily - retention sutures out   fentanyl patch   naproxen /gabapentin   surgical fu prn

## 2019-03-12 NOTE — PROGRESS NOTE ADULT - SUBJECTIVE AND OBJECTIVE BOX
CHIEF COMPLAINT:    Interval Events:    REVIEW OF SYSTEMS:  Constitutional:   Eyes:  ENT:  CV:  Resp:  GI:  :  MSK:  Integumentary:  Neurological:  Psychiatric:  Endocrine:  Hematologic/Lymphatic:  Allergic/Immunologic:  [ ] All other systems negative  [ ] Unable to assess ROS because ________    OBJECTIVE:  ICU Vital Signs Last 24 Hrs  T(C): 36.8 (12 Mar 2019 04:47), Max: 36.9 (11 Mar 2019 21:23)  T(F): 98.2 (12 Mar 2019 04:47), Max: 98.4 (11 Mar 2019 21:23)  HR: 94 (12 Mar 2019 06:44) (81 - 105)  BP: 133/77 (12 Mar 2019 04:47) (111/64 - 133/77)  BP(mean): --  ABP: --  ABP(mean): --  RR: 20 (12 Mar 2019 04:47) (18 - 20)  SpO2: 100% (12 Mar 2019 06:44) (94% - 100%)        CAPILLARY BLOOD GLUCOSE      POCT Blood Glucose.: 90 mg/dL (11 Mar 2019 21:20)      PHYSICAL EXAM:  General:   HEENT:   Lymph Nodes:  Neck:   Respiratory:   Cardiovascular:   Abdomen:   Extremities:   Skin:   Neurological:  Psychiatry:    HOSPITAL MEDICATIONS:  MEDICATIONS  (STANDING):  ascorbic acid Syrup 1000 milliGRAM(s) Oral once  chlorhexidine 4% Liquid 1 Application(s) Topical <User Schedule>  collagenase Ointment 1 Application(s) Topical daily  dextrose 5%. 1000 milliLiter(s) (50 mL/Hr) IV Continuous <Continuous>  dextrose 50% Injectable 12.5 Gram(s) IV Push once  dextrose 50% Injectable 25 Gram(s) IV Push once  dextrose 50% Injectable 25 Gram(s) IV Push once  fentaNYL   Patch  25 MICROgram(s)/Hr 1 Patch Transdermal every 72 hours  furosemide    Tablet 40 milliGRAM(s) Oral two times a day  gabapentin 300 milliGRAM(s) Oral three times a day  heparin  Injectable 7500 Unit(s) SubCutaneous every 8 hours  insulin glargine Injectable (LANTUS) 28 Unit(s) SubCutaneous at bedtime  insulin lispro (HumaLOG) corrective regimen sliding scale   SubCutaneous three times a day before meals  insulin lispro (HumaLOG) corrective regimen sliding scale   SubCutaneous at bedtime  insulin lispro Injectable (HumaLOG) 11 Unit(s) SubCutaneous three times a day before meals  lactobacillus acidophilus 1 Tablet(s) Oral three times a day with meals  levothyroxine 137 MICROGram(s) Oral <User Schedule>  naproxen 500 milliGRAM(s) Oral two times a day  pantoprazole    Tablet 40 milliGRAM(s) Oral before breakfast  potassium chloride   Powder 40 milliEquivalent(s) Oral daily  predniSONE   Tablet 10 milliGRAM(s) Oral daily  psyllium Powder 1 Packet(s) Oral daily  sildenafil (REVATIO) 20 milliGRAM(s) Oral three times a day  simvastatin 20 milliGRAM(s) Oral at bedtime  traMADol 50 milliGRAM(s) Oral two times a day    MEDICATIONS  (PRN):  acetaminophen   Tablet .. 650 milliGRAM(s) Oral every 6 hours PRN Temp greater or equal to 38C (100.4F), Moderate Pain (4 - 6)  dextrose 40% Gel 15 Gram(s) Oral once PRN Blood Glucose LESS THAN 70 milliGRAM(s)/deciLiter  glucagon  Injectable 1 milliGRAM(s) IntraMuscular once PRN Glucose <70 milliGRAM(s)/deciLiter  oxyCODONE    IR 5 milliGRAM(s) Oral every 4 hours PRN moderate and severe pain      LABS:                    MICROBIOLOGY:     RADIOLOGY:  [ ] Reviewed and interpreted by me    PULMONARY FUNCTION TESTS:    EKG: CHIEF COMPLAINT: Patient is a 63y old  Female who presents with a chief complaint of Patient is a 63y old  Female who presents with a chief complaint of Respiratory Failure (08 Mar 2019 13:03) (11 Mar 2019 08:51)      Interval Events: None    REVIEW OF SYSTEMS:  Constitutional: No fevers/pain    CV: Denies   Resp: + COREAS   MSK: weakness   Integumentary:  Neurological:  Psychiatric:  Endocrine:  Hematologic/Lymphatic:  Allergic/Immunologic:  [ ] All other systems negative  [ ] Unable to assess ROS because ________    OBJECTIVE:  ICU Vital Signs Last 24 Hrs  T(C): 36.8 (12 Mar 2019 04:47), Max: 36.9 (11 Mar 2019 21:23)  T(F): 98.2 (12 Mar 2019 04:47), Max: 98.4 (11 Mar 2019 21:23)  HR: 94 (12 Mar 2019 06:44) (81 - 105)  BP: 133/77 (12 Mar 2019 04:47) (111/64 - 133/77)  BP(mean): --  ABP: --  ABP(mean): --  RR: 20 (12 Mar 2019 04:47) (18 - 20)  SpO2: 100% (12 Mar 2019 06:44) (94% - 100%)        CAPILLARY BLOOD GLUCOSE      POCT Blood Glucose.: 90 mg/dL (11 Mar 2019 21:20)        HOSPITAL MEDICATIONS:  MEDICATIONS  (STANDING):  ascorbic acid Syrup 1000 milliGRAM(s) Oral once  chlorhexidine 4% Liquid 1 Application(s) Topical <User Schedule>  collagenase Ointment 1 Application(s) Topical daily  dextrose 5%. 1000 milliLiter(s) (50 mL/Hr) IV Continuous <Continuous>  dextrose 50% Injectable 12.5 Gram(s) IV Push once  dextrose 50% Injectable 25 Gram(s) IV Push once  dextrose 50% Injectable 25 Gram(s) IV Push once  fentaNYL   Patch  25 MICROgram(s)/Hr 1 Patch Transdermal every 72 hours  furosemide    Tablet 40 milliGRAM(s) Oral two times a day  gabapentin 300 milliGRAM(s) Oral three times a day  heparin  Injectable 7500 Unit(s) SubCutaneous every 8 hours  insulin glargine Injectable (LANTUS) 28 Unit(s) SubCutaneous at bedtime  insulin lispro (HumaLOG) corrective regimen sliding scale   SubCutaneous three times a day before meals  insulin lispro (HumaLOG) corrective regimen sliding scale   SubCutaneous at bedtime  insulin lispro Injectable (HumaLOG) 11 Unit(s) SubCutaneous three times a day before meals  lactobacillus acidophilus 1 Tablet(s) Oral three times a day with meals  levothyroxine 137 MICROGram(s) Oral <User Schedule>  naproxen 500 milliGRAM(s) Oral two times a day  pantoprazole    Tablet 40 milliGRAM(s) Oral before breakfast  potassium chloride   Powder 40 milliEquivalent(s) Oral daily  predniSONE   Tablet 10 milliGRAM(s) Oral daily  psyllium Powder 1 Packet(s) Oral daily  sildenafil (REVATIO) 20 milliGRAM(s) Oral three times a day  simvastatin 20 milliGRAM(s) Oral at bedtime  traMADol 50 milliGRAM(s) Oral two times a day    MEDICATIONS  (PRN):  acetaminophen   Tablet .. 650 milliGRAM(s) Oral every 6 hours PRN Temp greater or equal to 38C (100.4F), Moderate Pain (4 - 6)  dextrose 40% Gel 15 Gram(s) Oral once PRN Blood Glucose LESS THAN 70 milliGRAM(s)/deciLiter  glucagon  Injectable 1 milliGRAM(s) IntraMuscular once PRN Glucose <70 milliGRAM(s)/deciLiter  oxyCODONE    IR 5 milliGRAM(s) Oral every 4 hours PRN moderate and severe pain      LABS:                    MICROBIOLOGY:     RADIOLOGY:  [ ] Reviewed and interpreted by me    PULMONARY FUNCTION TESTS:    EKG: CHIEF COMPLAINT: Patient is a 63y old  Female who presents with a chief complaint of Patient is a 63y old  Female who presents with a chief complaint of Respiratory Failure (08 Mar 2019 13:03) (11 Mar 2019 08:51)      Interval Events: None    REVIEW OF SYSTEMS:  Constitutional: No fevers/pain  CV: Denies   Resp: + COREAS   MSK: weakness   Hematologic/Lymphatic:  Allergic/Immunologic:  [x ] All other systems negative      OBJECTIVE:  ICU Vital Signs Last 24 Hrs  T(C): 36.8 (12 Mar 2019 04:47), Max: 36.9 (11 Mar 2019 21:23)  T(F): 98.2 (12 Mar 2019 04:47), Max: 98.4 (11 Mar 2019 21:23)  HR: 94 (12 Mar 2019 06:44) (81 - 105)  BP: 133/77 (12 Mar 2019 04:47) (111/64 - 133/77)  BP(mean): --  ABP: --  ABP(mean): --  RR: 20 (12 Mar 2019 04:47) (18 - 20)  SpO2: 100% (12 Mar 2019 06:44) (94% - 100%)        CAPILLARY BLOOD GLUCOSE      POCT Blood Glucose.: 90 mg/dL (11 Mar 2019 21:20)        HOSPITAL MEDICATIONS:  MEDICATIONS  (STANDING):  ascorbic acid Syrup 1000 milliGRAM(s) Oral once  chlorhexidine 4% Liquid 1 Application(s) Topical <User Schedule>  collagenase Ointment 1 Application(s) Topical daily  dextrose 5%. 1000 milliLiter(s) (50 mL/Hr) IV Continuous <Continuous>  dextrose 50% Injectable 12.5 Gram(s) IV Push once  dextrose 50% Injectable 25 Gram(s) IV Push once  dextrose 50% Injectable 25 Gram(s) IV Push once  fentaNYL   Patch  25 MICROgram(s)/Hr 1 Patch Transdermal every 72 hours  furosemide    Tablet 40 milliGRAM(s) Oral two times a day  gabapentin 300 milliGRAM(s) Oral three times a day  heparin  Injectable 7500 Unit(s) SubCutaneous every 8 hours  insulin glargine Injectable (LANTUS) 28 Unit(s) SubCutaneous at bedtime  insulin lispro (HumaLOG) corrective regimen sliding scale   SubCutaneous three times a day before meals  insulin lispro (HumaLOG) corrective regimen sliding scale   SubCutaneous at bedtime  insulin lispro Injectable (HumaLOG) 11 Unit(s) SubCutaneous three times a day before meals  lactobacillus acidophilus 1 Tablet(s) Oral three times a day with meals  levothyroxine 137 MICROGram(s) Oral <User Schedule>  naproxen 500 milliGRAM(s) Oral two times a day  pantoprazole    Tablet 40 milliGRAM(s) Oral before breakfast  potassium chloride   Powder 40 milliEquivalent(s) Oral daily  predniSONE   Tablet 10 milliGRAM(s) Oral daily  psyllium Powder 1 Packet(s) Oral daily  sildenafil (REVATIO) 20 milliGRAM(s) Oral three times a day  simvastatin 20 milliGRAM(s) Oral at bedtime  traMADol 50 milliGRAM(s) Oral two times a day    MEDICATIONS  (PRN):  acetaminophen   Tablet .. 650 milliGRAM(s) Oral every 6 hours PRN Temp greater or equal to 38C (100.4F), Moderate Pain (4 - 6)  dextrose 40% Gel 15 Gram(s) Oral once PRN Blood Glucose LESS THAN 70 milliGRAM(s)/deciLiter  glucagon  Injectable 1 milliGRAM(s) IntraMuscular once PRN Glucose <70 milliGRAM(s)/deciLiter  oxyCODONE    IR 5 milliGRAM(s) Oral every 4 hours PRN moderate and severe pain      LABS:                    MICROBIOLOGY:     RADIOLOGY:  [ ] Reviewed and interpreted by me    PULMONARY FUNCTION TESTS:    EKG:

## 2019-03-12 NOTE — PROGRESS NOTE ADULT - ATTENDING COMMENTS
Pt is doing very well, on nasal cannula today  OOB. Worked with PT yesterday.    I had a long d/w pt today, again about her GOC.  We discussed that she is doing very well, and that our goal is to have her continue to do well and plan for d/c to rehab later this week/next week.  However, that we also need to know her wishes in the event that she decompensates. Does she want to live with a trach, in a nursing home, poss unable to eat or speak. Loren states that she will not consent for a trach. She understands that this means that we would not re-intubate her, and she stated clearly that she is ok with that. Witnessed by pulmonary fellow, as well. And, pt's son, Helena, heard the conversation on the phone.    I also spoke with Heavenly, pt's daughter and HCP. She feels that , although her mother will not say it, because she is superstitious and feels it will cause bad outcome, she does not want to be trached and live like that. She said her mom's power is her talking and her independence, and she would never want to live without that.    Therefore, given above and all prior conversations, pt is DNI.    Hopefully, as she cont to do well, plan for d/c to [facility] soon.  Heavenly said she is coming up again on thursday and will look at facilities.

## 2019-03-12 NOTE — PROGRESS NOTE ADULT - ASSESSMENT
63 year old female with PMHx of HTN, DM, hypothyroidism, RA (prednisone 10mg), pulmonary HTN, CHRIS, and COPD (3L at home) who was originally admitted on 12/15 for SBO s/p ex lap with lysis of adhesions (4 retention sutures) c/b 12/30 she eviscerated 2/2 a coughing episode s/p ex lap (6 retention sutures) c/b multifocal pneumonia, developed hypercapnic respiratory failure requiring intubation 1/6, s/p bronch 1/9/18, and extubated on 1/24, course further c/b Enterococci bacteremia now readmitted to MICU for hypoxic respiratory failure 2/2 atelectasis requiring intubation 01/31. Had grown ESBL in urine, s/p ertapenem.  Extubated to high flow/ BIPAP.     Problem/Plan - 1:  ·  Problem: Acute respiratory failure.  Plan: - prolonged intubation, now extubated to high flow/BIPAP  - BIPAP HS and PRN  - high flow, wean as tolerated - on 25L/35% today and tolerating  - cont sildenafil  - cont steroids  - chest PT  -c/w  lasix to PO 40 BID.      Problem/Plan - 2:  ·  Problem: Enterococcal bacteremia.  Plan: - had 27 days of Vanc, 3 days of Ceftriaxone, and 8 days of Zosyn for enterococcal bacteremia  - cultures cleared  - cont to monitor/ follow temps.      Problem/Plan - 3:  ·  Problem: Rheumatoid arthritis.  Plan: - cont prednisone  - supportive care  -Mobility encouraged.      Problem/Plan - 4:  ·  Problem: Fever.  Plan: - febrile on 3/6  - no fevers since  - repeat bcx NTD  - monitor off abx.      Problem/Plan - 5:  ·  Problem: ESBL (extended spectrum beta-lactamase) producing bacteria infection.  Plan: - ESBL in urine  - completed ertapenem on 2/27  - passed TOV.      Problem/Plan - 6:  Problem: Type 2 diabetes mellitus. Plan: - a1c 7.3  - cont basal / bolus insulin  - endo note appreciated.     Problem/Plan - 7:  ·  Problem: SBO (small bowel obstruction).  Plan: - SBO s/p ex-lap with findings of healthy bowel c/b evisceration of bowel requiring placement of retention sutures  - sx evaluated wound - retention sutures removed   - cont dressing to wound change 2x day  -c/w naproxen and gabapentin  -change fentanyl patch to 25mcg.      Problem/Plan - 8:  ·  Problem: Need for prophylactic measure.  Plan: - heparin.      Problem/Plan - 9:  ·  Problem: Conflicted attitude towards treatment.  Plan: - Pulm fellow spoke with patient who does not wish to be resuscitated or have trach.  Paperwork reviewed with pt by Rupal Pulmonology Fellow. 63 year old female with PMHx of HTN, DM, hypothyroidism, RA (prednisone 10mg), pulmonary HTN, CHRIS, and COPD (3L at home) who was originally admitted on 12/15 for SBO s/p ex lap with lysis of adhesions (4 retention sutures) c/b 12/30 she eviscerated 2/2 a coughing episode s/p ex lap (6 retention sutures) c/b multifocal pneumonia, developed hypercapnic respiratory failure requiring intubation 1/6, s/p bronch 1/9/18, and extubated on 1/24, course further c/b Enterococci bacteremia now readmitted to MICU for hypoxic respiratory failure 2/2 atelectasis requiring intubation 01/31. Had grown ESBL in urine, s/p ertapenem.  Extubated to high flow/ BIPAP.

## 2019-03-13 LAB
ANION GAP SERPL CALC-SCNC: 14 MMO/L — SIGNIFICANT CHANGE UP (ref 7–14)
BASOPHILS # BLD AUTO: 0.09 K/UL — SIGNIFICANT CHANGE UP (ref 0–0.2)
BASOPHILS NFR BLD AUTO: 0.8 % — SIGNIFICANT CHANGE UP (ref 0–2)
BUN SERPL-MCNC: 35 MG/DL — HIGH (ref 7–23)
CALCIUM SERPL-MCNC: 10 MG/DL — SIGNIFICANT CHANGE UP (ref 8.4–10.5)
CHLORIDE SERPL-SCNC: 96 MMOL/L — LOW (ref 98–107)
CO2 SERPL-SCNC: 25 MMOL/L — SIGNIFICANT CHANGE UP (ref 22–31)
CREAT SERPL-MCNC: 1.19 MG/DL — SIGNIFICANT CHANGE UP (ref 0.5–1.3)
EOSINOPHIL # BLD AUTO: 0.25 K/UL — SIGNIFICANT CHANGE UP (ref 0–0.5)
EOSINOPHIL NFR BLD AUTO: 2.2 % — SIGNIFICANT CHANGE UP (ref 0–6)
GLUCOSE BLDC GLUCOMTR-MCNC: 123 MG/DL — HIGH (ref 70–99)
GLUCOSE BLDC GLUCOMTR-MCNC: 144 MG/DL — HIGH (ref 70–99)
GLUCOSE BLDC GLUCOMTR-MCNC: 164 MG/DL — HIGH (ref 70–99)
GLUCOSE BLDC GLUCOMTR-MCNC: 296 MG/DL — HIGH (ref 70–99)
GLUCOSE SERPL-MCNC: 112 MG/DL — HIGH (ref 70–99)
HCT VFR BLD CALC: 31.8 % — LOW (ref 34.5–45)
HGB BLD-MCNC: 9.5 G/DL — LOW (ref 11.5–15.5)
IMM GRANULOCYTES NFR BLD AUTO: 2 % — HIGH (ref 0–1.5)
LYMPHOCYTES # BLD AUTO: 36.9 % — SIGNIFICANT CHANGE UP (ref 13–44)
LYMPHOCYTES # BLD AUTO: 4.24 K/UL — HIGH (ref 1–3.3)
MAGNESIUM SERPL-MCNC: 1.6 MG/DL — SIGNIFICANT CHANGE UP (ref 1.6–2.6)
MCHC RBC-ENTMCNC: 28.7 PG — SIGNIFICANT CHANGE UP (ref 27–34)
MCHC RBC-ENTMCNC: 29.9 % — LOW (ref 32–36)
MCV RBC AUTO: 96.1 FL — SIGNIFICANT CHANGE UP (ref 80–100)
MONOCYTES # BLD AUTO: 1.34 K/UL — HIGH (ref 0–0.9)
MONOCYTES NFR BLD AUTO: 11.7 % — SIGNIFICANT CHANGE UP (ref 2–14)
NEUTROPHILS # BLD AUTO: 5.34 K/UL — SIGNIFICANT CHANGE UP (ref 1.8–7.4)
NEUTROPHILS NFR BLD AUTO: 46.4 % — SIGNIFICANT CHANGE UP (ref 43–77)
NRBC # FLD: 0.05 K/UL — LOW (ref 25–125)
PLATELET # BLD AUTO: 467 K/UL — HIGH (ref 150–400)
PMV BLD: 9.4 FL — SIGNIFICANT CHANGE UP (ref 7–13)
POTASSIUM SERPL-MCNC: 4.7 MMOL/L — SIGNIFICANT CHANGE UP (ref 3.5–5.3)
POTASSIUM SERPL-SCNC: 4.7 MMOL/L — SIGNIFICANT CHANGE UP (ref 3.5–5.3)
RBC # BLD: 3.31 M/UL — LOW (ref 3.8–5.2)
RBC # FLD: 17.2 % — HIGH (ref 10.3–14.5)
SODIUM SERPL-SCNC: 135 MMOL/L — SIGNIFICANT CHANGE UP (ref 135–145)
WBC # BLD: 11.49 K/UL — HIGH (ref 3.8–10.5)
WBC # FLD AUTO: 11.49 K/UL — HIGH (ref 3.8–10.5)

## 2019-03-13 PROCEDURE — 99233 SBSQ HOSP IP/OBS HIGH 50: CPT | Mod: GC

## 2019-03-13 PROCEDURE — 99232 SBSQ HOSP IP/OBS MODERATE 35: CPT

## 2019-03-13 RX ORDER — INSULIN LISPRO 100/ML
13 VIAL (ML) SUBCUTANEOUS
Qty: 0 | Refills: 0 | Status: DISCONTINUED | OUTPATIENT
Start: 2019-03-13 | End: 2019-03-14

## 2019-03-13 RX ORDER — INSULIN LISPRO 100/ML
11 VIAL (ML) SUBCUTANEOUS
Qty: 0 | Refills: 0 | Status: DISCONTINUED | OUTPATIENT
Start: 2019-03-13 | End: 2019-03-20

## 2019-03-13 RX ADMIN — OXYCODONE HYDROCHLORIDE 5 MILLIGRAM(S): 5 TABLET ORAL at 10:00

## 2019-03-13 RX ADMIN — Medication 20 MILLIGRAM(S): at 12:25

## 2019-03-13 RX ADMIN — Medication 650 MILLIGRAM(S): at 01:26

## 2019-03-13 RX ADMIN — INSULIN GLARGINE 28 UNIT(S): 100 INJECTION, SOLUTION SUBCUTANEOUS at 22:32

## 2019-03-13 RX ADMIN — OXYCODONE HYDROCHLORIDE 5 MILLIGRAM(S): 5 TABLET ORAL at 09:22

## 2019-03-13 RX ADMIN — GABAPENTIN 300 MILLIGRAM(S): 400 CAPSULE ORAL at 23:50

## 2019-03-13 RX ADMIN — Medication 6: at 12:24

## 2019-03-13 RX ADMIN — GABAPENTIN 300 MILLIGRAM(S): 400 CAPSULE ORAL at 12:25

## 2019-03-13 RX ADMIN — Medication 1 APPLICATION(S): at 11:21

## 2019-03-13 RX ADMIN — Medication 11 UNIT(S): at 12:25

## 2019-03-13 RX ADMIN — Medication 40 MILLIEQUIVALENT(S): at 11:21

## 2019-03-13 RX ADMIN — Medication 500 MILLIGRAM(S): at 11:51

## 2019-03-13 RX ADMIN — Medication 650 MILLIGRAM(S): at 01:56

## 2019-03-13 RX ADMIN — TRAMADOL HYDROCHLORIDE 50 MILLIGRAM(S): 50 TABLET ORAL at 06:04

## 2019-03-13 RX ADMIN — SIMVASTATIN 20 MILLIGRAM(S): 20 TABLET, FILM COATED ORAL at 23:50

## 2019-03-13 RX ADMIN — OXYCODONE HYDROCHLORIDE 5 MILLIGRAM(S): 5 TABLET ORAL at 20:35

## 2019-03-13 RX ADMIN — Medication 650 MILLIGRAM(S): at 17:47

## 2019-03-13 RX ADMIN — TRAMADOL HYDROCHLORIDE 50 MILLIGRAM(S): 50 TABLET ORAL at 17:47

## 2019-03-13 RX ADMIN — FENTANYL CITRATE 1 PATCH: 50 INJECTION INTRAVENOUS at 20:34

## 2019-03-13 RX ADMIN — Medication 1 TABLET(S): at 08:47

## 2019-03-13 RX ADMIN — Medication 20 MILLIGRAM(S): at 06:04

## 2019-03-13 RX ADMIN — TRAMADOL HYDROCHLORIDE 50 MILLIGRAM(S): 50 TABLET ORAL at 17:17

## 2019-03-13 RX ADMIN — PANTOPRAZOLE SODIUM 40 MILLIGRAM(S): 20 TABLET, DELAYED RELEASE ORAL at 06:04

## 2019-03-13 RX ADMIN — Medication 500 MILLIGRAM(S): at 11:21

## 2019-03-13 RX ADMIN — Medication 10 MILLIGRAM(S): at 06:04

## 2019-03-13 RX ADMIN — CHLORHEXIDINE GLUCONATE 1 APPLICATION(S): 213 SOLUTION TOPICAL at 08:46

## 2019-03-13 RX ADMIN — Medication 137 MICROGRAM(S): at 06:05

## 2019-03-13 RX ADMIN — Medication 11 UNIT(S): at 17:16

## 2019-03-13 RX ADMIN — Medication 500 MILLIGRAM(S): at 23:49

## 2019-03-13 RX ADMIN — Medication 650 MILLIGRAM(S): at 17:17

## 2019-03-13 RX ADMIN — FENTANYL CITRATE 1 PATCH: 50 INJECTION INTRAVENOUS at 06:05

## 2019-03-13 RX ADMIN — Medication 2: at 08:46

## 2019-03-13 RX ADMIN — Medication 11 UNIT(S): at 08:46

## 2019-03-13 RX ADMIN — Medication 1 TABLET(S): at 11:21

## 2019-03-13 RX ADMIN — OXYCODONE HYDROCHLORIDE 5 MILLIGRAM(S): 5 TABLET ORAL at 14:24

## 2019-03-13 RX ADMIN — Medication 40 MILLIGRAM(S): at 06:04

## 2019-03-13 RX ADMIN — OXYCODONE HYDROCHLORIDE 5 MILLIGRAM(S): 5 TABLET ORAL at 21:05

## 2019-03-13 RX ADMIN — Medication 20 MILLIGRAM(S): at 23:50

## 2019-03-13 RX ADMIN — Medication 40 MILLIGRAM(S): at 17:17

## 2019-03-13 RX ADMIN — Medication 1 TABLET(S): at 17:18

## 2019-03-13 RX ADMIN — HEPARIN SODIUM 7500 UNIT(S): 5000 INJECTION INTRAVENOUS; SUBCUTANEOUS at 23:49

## 2019-03-13 RX ADMIN — OXYCODONE HYDROCHLORIDE 5 MILLIGRAM(S): 5 TABLET ORAL at 14:54

## 2019-03-13 RX ADMIN — GABAPENTIN 300 MILLIGRAM(S): 400 CAPSULE ORAL at 06:04

## 2019-03-13 NOTE — PROGRESS NOTE ADULT - SUBJECTIVE AND OBJECTIVE BOX
Chief Complaint/Follow-up on: Dm    Subjective: appetite much approved.  denies n/v, denies s/s of hypoglycemia.  Reports was on tresiba at home and knows how to use insulin pen    MEDICATIONS  (STANDING):  ascorbic acid Syrup 1000 milliGRAM(s) Oral once  chlorhexidine 4% Liquid 1 Application(s) Topical <User Schedule>  collagenase Ointment 1 Application(s) Topical daily  dextrose 5%. 1000 milliLiter(s) (50 mL/Hr) IV Continuous <Continuous>  dextrose 50% Injectable 12.5 Gram(s) IV Push once  dextrose 50% Injectable 25 Gram(s) IV Push once  dextrose 50% Injectable 25 Gram(s) IV Push once  fentaNYL   Patch  25 MICROgram(s)/Hr 1 Patch Transdermal every 72 hours  furosemide    Tablet 40 milliGRAM(s) Oral two times a day  gabapentin 300 milliGRAM(s) Oral three times a day  heparin  Injectable 7500 Unit(s) SubCutaneous every 8 hours  insulin glargine Injectable (LANTUS) 28 Unit(s) SubCutaneous at bedtime  insulin lispro (HumaLOG) corrective regimen sliding scale   SubCutaneous three times a day before meals  insulin lispro (HumaLOG) corrective regimen sliding scale   SubCutaneous at bedtime  insulin lispro Injectable (HumaLOG) 11 Unit(s) SubCutaneous three times a day before meals  lactobacillus acidophilus 1 Tablet(s) Oral three times a day with meals  levothyroxine 137 MICROGram(s) Oral <User Schedule>  naproxen 500 milliGRAM(s) Oral two times a day  pantoprazole    Tablet 40 milliGRAM(s) Oral before breakfast  potassium chloride   Powder 40 milliEquivalent(s) Oral daily  predniSONE   Tablet 10 milliGRAM(s) Oral daily  psyllium Powder 1 Packet(s) Oral daily  sildenafil (REVATIO) 20 milliGRAM(s) Oral three times a day  simvastatin 20 milliGRAM(s) Oral at bedtime  traMADol 50 milliGRAM(s) Oral two times a day      MEDICATIONS  (PRN):  acetaminophen   Tablet .. 650 milliGRAM(s) Oral every 6 hours PRN Temp greater or equal to 38C (100.4F), Moderate Pain (4 - 6)  dextrose 40% Gel 15 Gram(s) Oral once PRN Blood Glucose LESS THAN 70 milliGRAM(s)/deciLiter  glucagon  Injectable 1 milliGRAM(s) IntraMuscular once PRN Glucose <70 milliGRAM(s)/deciLiter  oxyCODONE    IR 5 milliGRAM(s) Oral every 4 hours PRN moderate and severe pain      PHYSICAL EXAM:  Vital Signs Last 24 Hrs  T(C): 37.2 (13 Mar 2019 15:52), Max: 37.2 (12 Mar 2019 22:33)  T(F): 98.9 (13 Mar 2019 15:52), Max: 99 (12 Mar 2019 22:33)  HR: 105 (13 Mar 2019 16:09) (90 - 123)  BP: 140/93 (13 Mar 2019 15:52) (117/71 - 140/93)  BP(mean): --  RR: 18 (13 Mar 2019 15:52) (18 - 18)  SpO2: 98% (13 Mar 2019 16:09) (93% - 100%)  GENERAL: NAD,  well-developed  EYES: No proptosis, no injection  HEENT:  Atraumatic, Normocephalic, moist mucous membranes  GI: Soft, nontender, non distended, normal bowel sounds      CAPILLARY BLOOD GLUCOSE      POCT Blood Glucose.: 296 mg/dL (13 Mar 2019 12:07)  POCT Blood Glucose.: 164 mg/dL (13 Mar 2019 08:09)  POCT Blood Glucose.: 122 mg/dL (12 Mar 2019 22:49)  POCT Blood Glucose.: 76 mg/dL (12 Mar 2019 21:49)  POCT Blood Glucose.: 174 mg/dL (12 Mar 2019 16:50)    POCT Blood Glucose.: 126 mg/dL (11 Mar 2019 07:56)  POCT Blood Glucose.: 147 mg/dL (10 Mar 2019 21:38)  POCT Blood Glucose.: 213 mg/dL (10 Mar 2019 16:54)    POCT Blood Glucose.: 375 mg/dL (03-09-19 @ 09:41)  POCT Blood Glucose.: 299 mg/dL (03-09-19 @ 08:25)  POCT Blood Glucose.: 308 mg/dL (03-08-19 @ 21:28)  POCT Blood Glucose.: 399 mg/dL (03-08-19 @ 17:20)  POCT Blood Glucose.: 315 mg/dL (03-08-19 @ 11:57)  POCT Blood Glucose.: 183 mg/dL (03-08-19 @ 07:45)  POCT Blood Glucose.: 271 mg/dL (03-07-19 @ 22:16)  POCT Blood Glucose.: 314 mg/dL (03-07-19 @ 17:00)  POCT Blood Glucose.: 337 mg/dL (03-07-19 @ 11:41)  POCT Blood Glucose.: 334 mg/dL (03-07-19 @ 07:57)  POCT Blood Glucose.: 397 mg/dL (03-06-19 @ 21:20)  POCT Blood Glucose.: 372 mg/dL (03-06-19 @ 17:08)  POCT Blood Glucose.: 385 mg/dL (03-06-19 @ 16:58)  POCT Blood Glucose.: 325 mg/dL (03-06-19 @ 11:56)    03-09    133<L>  |  94<L>  |  30<H>  ----------------------------<  211<H>  3.9   |  24  |  1.10    EGFR if : 63  EGFR if non : 54    Ca    8.8      03-09    TPro  6.9  /  Alb  2.9<L>  /  TBili  0.3  /  DBili  x   /  AST  16  /  ALT  16  /  AlkPhos  104  03-07        Hemoglobin A1C, Whole Blood: 7.1 % <H> [4.0 - 5.6] (12-17-18 @ 03:50)  Hemoglobin A1C, Whole Blood: 7.3 % <H> [4.0 - 5.6] (12-16-18 @ 04:17)

## 2019-03-13 NOTE — PROGRESS NOTE ADULT - ASSESSMENT
63 F with PMH of COPD (on home O2 (3L), hypothyroidism, T2DM (HbA1c 8s 11/2018, on insulin), RA on prednisone (10 mg daily x 30 years), HTN,  admitted for SBO s/p ex lap with lysis of adhesions, had been intubated for hypercapnic respiratory failure, now extubated feeling better.  Appetite improved

## 2019-03-13 NOTE — PROGRESS NOTE ADULT - SUBJECTIVE AND OBJECTIVE BOX
CHIEF COMPLAINT:    Interval Events:    REVIEW OF SYSTEMS:  Constitutional:   Eyes:  ENT:  CV:  Resp:  GI:  :  MSK:  Integumentary:  Neurological:  Psychiatric:  Endocrine:  Hematologic/Lymphatic:  Allergic/Immunologic:  [ ] All other systems negative  [ ] Unable to assess ROS because ________    OBJECTIVE:  ICU Vital Signs Last 24 Hrs  T(C): 36.9 (13 Mar 2019 06:00), Max: 37.2 (12 Mar 2019 22:33)  T(F): 98.4 (13 Mar 2019 06:00), Max: 99 (12 Mar 2019 22:33)  HR: 90 (13 Mar 2019 06:00) (90 - 123)  BP: 124/98 (13 Mar 2019 06:00) (117/71 - 127/97)  BP(mean): --  ABP: --  ABP(mean): --  RR: 18 (13 Mar 2019 06:00) (18 - 19)  SpO2: 100% (13 Mar 2019 06:00) (93% - 100%)        CAPILLARY BLOOD GLUCOSE      POCT Blood Glucose.: 164 mg/dL (13 Mar 2019 08:09)      PHYSICAL EXAM:  General:   HEENT:   Lymph Nodes:  Neck:   Respiratory:   Cardiovascular:   Abdomen:   Extremities:   Skin:   Neurological:  Psychiatry:    HOSPITAL MEDICATIONS:  MEDICATIONS  (STANDING):  ascorbic acid Syrup 1000 milliGRAM(s) Oral once  chlorhexidine 4% Liquid 1 Application(s) Topical <User Schedule>  collagenase Ointment 1 Application(s) Topical daily  dextrose 5%. 1000 milliLiter(s) (50 mL/Hr) IV Continuous <Continuous>  dextrose 50% Injectable 12.5 Gram(s) IV Push once  dextrose 50% Injectable 25 Gram(s) IV Push once  dextrose 50% Injectable 25 Gram(s) IV Push once  fentaNYL   Patch  25 MICROgram(s)/Hr 1 Patch Transdermal every 72 hours  furosemide    Tablet 40 milliGRAM(s) Oral two times a day  gabapentin 300 milliGRAM(s) Oral three times a day  heparin  Injectable 7500 Unit(s) SubCutaneous every 8 hours  insulin glargine Injectable (LANTUS) 28 Unit(s) SubCutaneous at bedtime  insulin lispro (HumaLOG) corrective regimen sliding scale   SubCutaneous three times a day before meals  insulin lispro (HumaLOG) corrective regimen sliding scale   SubCutaneous at bedtime  insulin lispro Injectable (HumaLOG) 11 Unit(s) SubCutaneous three times a day before meals  lactobacillus acidophilus 1 Tablet(s) Oral three times a day with meals  levothyroxine 137 MICROGram(s) Oral <User Schedule>  naproxen 500 milliGRAM(s) Oral two times a day  pantoprazole    Tablet 40 milliGRAM(s) Oral before breakfast  potassium chloride   Powder 40 milliEquivalent(s) Oral daily  predniSONE   Tablet 10 milliGRAM(s) Oral daily  psyllium Powder 1 Packet(s) Oral daily  sildenafil (REVATIO) 20 milliGRAM(s) Oral three times a day  simvastatin 20 milliGRAM(s) Oral at bedtime  traMADol 50 milliGRAM(s) Oral two times a day    MEDICATIONS  (PRN):  acetaminophen   Tablet .. 650 milliGRAM(s) Oral every 6 hours PRN Temp greater or equal to 38C (100.4F), Moderate Pain (4 - 6)  dextrose 40% Gel 15 Gram(s) Oral once PRN Blood Glucose LESS THAN 70 milliGRAM(s)/deciLiter  glucagon  Injectable 1 milliGRAM(s) IntraMuscular once PRN Glucose <70 milliGRAM(s)/deciLiter  oxyCODONE    IR 5 milliGRAM(s) Oral every 4 hours PRN moderate and severe pain      LABS:                        9.5    11.49 )-----------( 467      ( 13 Mar 2019 06:06 )             31.8     03-13    135  |  96<L>  |  35<H>  ----------------------------<  112<H>  4.7   |  25  |  1.19    Ca    10.0      13 Mar 2019 06:06  Mg     1.6     03-13                MICROBIOLOGY:     RADIOLOGY:  [ ] Reviewed and interpreted by me    PULMONARY FUNCTION TESTS:    EKG: CHIEF COMPLAINT: Patient is a 63y old  Female who presents with a chief complaint of Patient is a 63y old  Female who presents with a chief complaint of Respiratory Failure (08 Mar 2019 13:03) (11 Mar 2019 08:51)      Interval Events: Tolerating Nasal cannula     REVIEW OF SYSTEMS:  Constitutional:  Feeling better , asking re: rehab   CV: Denies   Resp: + COREAS  GI: Denies   MSK: Weak   [x ] All other systems negative    OBJECTIVE:  ICU Vital Signs Last 24 Hrs  T(C): 36.9 (13 Mar 2019 06:00), Max: 37.2 (12 Mar 2019 22:33)  T(F): 98.4 (13 Mar 2019 06:00), Max: 99 (12 Mar 2019 22:33)  HR: 90 (13 Mar 2019 06:00) (90 - 123)  BP: 124/98 (13 Mar 2019 06:00) (117/71 - 127/97)  BP(mean): --  ABP: --  ABP(mean): --  RR: 18 (13 Mar 2019 06:00) (18 - 19)  SpO2: 100% (13 Mar 2019 06:00) (93% - 100%)        CAPILLARY BLOOD GLUCOSE      POCT Blood Glucose.: 164 mg/dL (13 Mar 2019 08:09)      PHYSICAL EXAM:  General:   HEENT:   Lymph Nodes:  Neck:   Respiratory:   Cardiovascular:   Abdomen:   Extremities:   Skin:   Neurological:  Psychiatry:    HOSPITAL MEDICATIONS:  MEDICATIONS  (STANDING):  ascorbic acid Syrup 1000 milliGRAM(s) Oral once  chlorhexidine 4% Liquid 1 Application(s) Topical <User Schedule>  collagenase Ointment 1 Application(s) Topical daily  dextrose 5%. 1000 milliLiter(s) (50 mL/Hr) IV Continuous <Continuous>  dextrose 50% Injectable 12.5 Gram(s) IV Push once  dextrose 50% Injectable 25 Gram(s) IV Push once  dextrose 50% Injectable 25 Gram(s) IV Push once  fentaNYL   Patch  25 MICROgram(s)/Hr 1 Patch Transdermal every 72 hours  furosemide    Tablet 40 milliGRAM(s) Oral two times a day  gabapentin 300 milliGRAM(s) Oral three times a day  heparin  Injectable 7500 Unit(s) SubCutaneous every 8 hours  insulin glargine Injectable (LANTUS) 28 Unit(s) SubCutaneous at bedtime  insulin lispro (HumaLOG) corrective regimen sliding scale   SubCutaneous three times a day before meals  insulin lispro (HumaLOG) corrective regimen sliding scale   SubCutaneous at bedtime  insulin lispro Injectable (HumaLOG) 11 Unit(s) SubCutaneous three times a day before meals  lactobacillus acidophilus 1 Tablet(s) Oral three times a day with meals  levothyroxine 137 MICROGram(s) Oral <User Schedule>  naproxen 500 milliGRAM(s) Oral two times a day  pantoprazole    Tablet 40 milliGRAM(s) Oral before breakfast  potassium chloride   Powder 40 milliEquivalent(s) Oral daily  predniSONE   Tablet 10 milliGRAM(s) Oral daily  psyllium Powder 1 Packet(s) Oral daily  sildenafil (REVATIO) 20 milliGRAM(s) Oral three times a day  simvastatin 20 milliGRAM(s) Oral at bedtime  traMADol 50 milliGRAM(s) Oral two times a day    MEDICATIONS  (PRN):  acetaminophen   Tablet .. 650 milliGRAM(s) Oral every 6 hours PRN Temp greater or equal to 38C (100.4F), Moderate Pain (4 - 6)  dextrose 40% Gel 15 Gram(s) Oral once PRN Blood Glucose LESS THAN 70 milliGRAM(s)/deciLiter  glucagon  Injectable 1 milliGRAM(s) IntraMuscular once PRN Glucose <70 milliGRAM(s)/deciLiter  oxyCODONE    IR 5 milliGRAM(s) Oral every 4 hours PRN moderate and severe pain      LABS:                        9.5    11.49 )-----------( 467      ( 13 Mar 2019 06:06 )             31.8     03-13    135  |  96<L>  |  35<H>  ----------------------------<  112<H>  4.7   |  25  |  1.19    Ca    10.0      13 Mar 2019 06:06  Mg     1.6     03-13                MICROBIOLOGY:     RADIOLOGY:  [ ] Reviewed and interpreted by me    PULMONARY FUNCTION TESTS:    EKG:

## 2019-03-13 NOTE — PROGRESS NOTE ADULT - NSICDXPROBLEM_GEN_ALL_CORE_FT
PROBLEM DIAGNOSES  Problem: Acute respiratory failure  Assessment and Plan: Highflow titrated down to nasal cannula today and tolerating   Bipap at hs   Continue sildenafil   Chest PT   OOB    Lasix bid     Problem: Bacteremia  Assessment and Plan: Clutures cleared   off abx     Problem: Fever  Assessment and Plan: Pt had fever 3/6 cx sent no GTF  no further fevers   no abx     Problem: Type 2 diabetes mellitus  Assessment and Plan: cont basil /premeal insulin   Endo recs noted       Problem: Rheumatoid arthritis  Assessment and Plan: Continues on home dose of prednisone 10mg daiy   PT for mobility     Problem: Conflicted attitude towards treatment  Assessment and Plan: Pt requesting to be DNR does not want vent or trach   Spoke to [place] pt health care proxy who validates pts wishes   DNR on chart     Problem: SBO (small bowel obstruction)  Assessment and Plan: Abd dressing 2x daily - retention sutures out   fentanyl patch   naproxen /gabapentin   surgical fu prn

## 2019-03-13 NOTE — PROGRESS NOTE ADULT - NSICDXPROBLEM_GEN_ALL_CORE_FT
PROBLEM DIAGNOSES  Problem: Type 2 diabetes mellitus  Assessment and Plan: target bg 100-180 mg/dl  Would increase huamlog pre breakfast to 13 units sq daily  c/w humalog 11 units sq pre lunch and dinner  c/w lantus dose as ordered  c/w correction scalers as ordered  d/c plan- anticipate home regimen- will follow up with full dc plan

## 2019-03-14 LAB
GLUCOSE BLDC GLUCOMTR-MCNC: 132 MG/DL — HIGH (ref 70–99)
GLUCOSE BLDC GLUCOMTR-MCNC: 139 MG/DL — HIGH (ref 70–99)
GLUCOSE BLDC GLUCOMTR-MCNC: 144 MG/DL — HIGH (ref 70–99)
GLUCOSE BLDC GLUCOMTR-MCNC: 250 MG/DL — HIGH (ref 70–99)

## 2019-03-14 PROCEDURE — 99232 SBSQ HOSP IP/OBS MODERATE 35: CPT | Mod: GC

## 2019-03-14 RX ORDER — TRAMADOL HYDROCHLORIDE 50 MG/1
50 TABLET ORAL
Qty: 0 | Refills: 0 | Status: DISCONTINUED | OUTPATIENT
Start: 2019-03-14 | End: 2019-03-20

## 2019-03-14 RX ORDER — FENTANYL CITRATE 50 UG/ML
1 INJECTION INTRAVENOUS
Qty: 0 | Refills: 0 | Status: DISCONTINUED | OUTPATIENT
Start: 2019-03-14 | End: 2019-03-20

## 2019-03-14 RX ORDER — INSULIN LISPRO 100/ML
15 VIAL (ML) SUBCUTANEOUS
Qty: 0 | Refills: 0 | Status: DISCONTINUED | OUTPATIENT
Start: 2019-03-15 | End: 2019-03-20

## 2019-03-14 RX ORDER — SODIUM CHLORIDE 0.65 %
1 AEROSOL, SPRAY (ML) NASAL
Qty: 0 | Refills: 0 | Status: DISCONTINUED | OUTPATIENT
Start: 2019-03-14 | End: 2019-03-20

## 2019-03-14 RX ORDER — OXYCODONE HYDROCHLORIDE 5 MG/1
5 TABLET ORAL EVERY 4 HOURS
Qty: 0 | Refills: 0 | Status: DISCONTINUED | OUTPATIENT
Start: 2019-03-14 | End: 2019-03-20

## 2019-03-14 RX ADMIN — OXYCODONE HYDROCHLORIDE 5 MILLIGRAM(S): 5 TABLET ORAL at 09:54

## 2019-03-14 RX ADMIN — Medication 500 MILLIGRAM(S): at 22:40

## 2019-03-14 RX ADMIN — HEPARIN SODIUM 7500 UNIT(S): 5000 INJECTION INTRAVENOUS; SUBCUTANEOUS at 06:23

## 2019-03-14 RX ADMIN — OXYCODONE HYDROCHLORIDE 5 MILLIGRAM(S): 5 TABLET ORAL at 10:24

## 2019-03-14 RX ADMIN — Medication 20 MILLIGRAM(S): at 06:23

## 2019-03-14 RX ADMIN — PANTOPRAZOLE SODIUM 40 MILLIGRAM(S): 20 TABLET, DELAYED RELEASE ORAL at 06:24

## 2019-03-14 RX ADMIN — Medication 500 MILLIGRAM(S): at 00:19

## 2019-03-14 RX ADMIN — INSULIN GLARGINE 28 UNIT(S): 100 INJECTION, SOLUTION SUBCUTANEOUS at 22:40

## 2019-03-14 RX ADMIN — GABAPENTIN 300 MILLIGRAM(S): 400 CAPSULE ORAL at 22:38

## 2019-03-14 RX ADMIN — SIMVASTATIN 20 MILLIGRAM(S): 20 TABLET, FILM COATED ORAL at 22:40

## 2019-03-14 RX ADMIN — Medication 11 UNIT(S): at 17:31

## 2019-03-14 RX ADMIN — OXYCODONE HYDROCHLORIDE 5 MILLIGRAM(S): 5 TABLET ORAL at 14:38

## 2019-03-14 RX ADMIN — Medication 10 MILLIGRAM(S): at 06:23

## 2019-03-14 RX ADMIN — TRAMADOL HYDROCHLORIDE 50 MILLIGRAM(S): 50 TABLET ORAL at 06:24

## 2019-03-14 RX ADMIN — Medication 11 UNIT(S): at 12:25

## 2019-03-14 RX ADMIN — Medication 1 SPRAY(S): at 18:11

## 2019-03-14 RX ADMIN — Medication 1 APPLICATION(S): at 12:26

## 2019-03-14 RX ADMIN — Medication 500 MILLIGRAM(S): at 23:30

## 2019-03-14 RX ADMIN — Medication 1 TABLET(S): at 08:53

## 2019-03-14 RX ADMIN — Medication 20 MILLIGRAM(S): at 22:38

## 2019-03-14 RX ADMIN — Medication 137 MICROGRAM(S): at 06:24

## 2019-03-14 RX ADMIN — Medication 500 MILLIGRAM(S): at 12:26

## 2019-03-14 RX ADMIN — Medication 40 MILLIEQUIVALENT(S): at 12:26

## 2019-03-14 RX ADMIN — GABAPENTIN 300 MILLIGRAM(S): 400 CAPSULE ORAL at 14:38

## 2019-03-14 RX ADMIN — CHLORHEXIDINE GLUCONATE 1 APPLICATION(S): 213 SOLUTION TOPICAL at 09:55

## 2019-03-14 RX ADMIN — Medication 1 TABLET(S): at 17:31

## 2019-03-14 RX ADMIN — TRAMADOL HYDROCHLORIDE 50 MILLIGRAM(S): 50 TABLET ORAL at 17:31

## 2019-03-14 RX ADMIN — OXYCODONE HYDROCHLORIDE 5 MILLIGRAM(S): 5 TABLET ORAL at 20:01

## 2019-03-14 RX ADMIN — FENTANYL CITRATE 1 PATCH: 50 INJECTION INTRAVENOUS at 07:35

## 2019-03-14 RX ADMIN — OXYCODONE HYDROCHLORIDE 5 MILLIGRAM(S): 5 TABLET ORAL at 20:34

## 2019-03-14 RX ADMIN — FENTANYL CITRATE 1 PATCH: 50 INJECTION INTRAVENOUS at 22:28

## 2019-03-14 RX ADMIN — Medication 40 MILLIGRAM(S): at 17:31

## 2019-03-14 RX ADMIN — Medication 4: at 12:26

## 2019-03-14 RX ADMIN — TRAMADOL HYDROCHLORIDE 50 MILLIGRAM(S): 50 TABLET ORAL at 06:54

## 2019-03-14 RX ADMIN — GABAPENTIN 300 MILLIGRAM(S): 400 CAPSULE ORAL at 06:24

## 2019-03-14 RX ADMIN — Medication 40 MILLIGRAM(S): at 06:24

## 2019-03-14 RX ADMIN — Medication 13 UNIT(S): at 08:53

## 2019-03-14 RX ADMIN — Medication 1 TABLET(S): at 12:26

## 2019-03-14 RX ADMIN — Medication 20 MILLIGRAM(S): at 14:38

## 2019-03-14 RX ADMIN — HEPARIN SODIUM 7500 UNIT(S): 5000 INJECTION INTRAVENOUS; SUBCUTANEOUS at 14:39

## 2019-03-14 RX ADMIN — OXYCODONE HYDROCHLORIDE 5 MILLIGRAM(S): 5 TABLET ORAL at 15:08

## 2019-03-14 NOTE — PROVIDER CONTACT NOTE (OTHER) - ACTION/TREATMENT ORDERED:
Ritesh tylenol as ordered and will evaluate patient.
administered 20units of humulin and 12 units of Humalog
no action ordered at this time, just give Lantus and recheck before breakfast
provider aware pt refused morning blood draw
0.5 mg of Dilaudid to be ordered.  continue to monitor.
Continue to Monitor  Administered anti-hypertensive as per EMAR.
Dr. Nicole is aware and will plan a family meeting.
IV tylenol, blood cultures, UA
MD at patients bedside, asked the dressing to be changed and continue to monitor
MD informed nurse ecchymosis may be related to bruising/heparin/multiple IV sticks, will continue to monitor.
Obtain blood cultures and give tylenol PO
Provider ordered blood cultures., urinalysis. chest xray and urine specimen.
Reassess after pain medication and antihypertensive
SICU team hold insulin, restart tubefeeds,
Will continue to monitor
cultures and UA
give tylenol per orders.
ok to given 10 PM medications now.  continue to monitor.
pa to come to floor and speak with patient.  continue to monitor.
same as above
same as above, monitor

## 2019-03-14 NOTE — PROGRESS NOTE ADULT - NSICDXPROBLEM_GEN_ALL_CORE_FT
PROBLEM DIAGNOSES  Problem: Acute respiratory failure  Assessment and Plan: - prolonged intubation, now extubated to high flow/BIPAP  - BIPAP HS and PRN  - weaned from high flow to nasal cannula, tolerating  - cont sildenafil  - cont steroids  - chest PT    Problem: Enterococcal bacteremia  Assessment and Plan: - had 27 days of Vanc, 3 days of Ceftriaxone, and 8 days of Zosyn for enterococcal bacteremia  - cultures cleared    Problem: ESBL (extended spectrum beta-lactamase) producing bacteria infection  Assessment and Plan: - ESBL in urine  - completed ertapenem on 2/27  - passed TOV    Problem: Type 2 diabetes mellitus  Assessment and Plan: - a1c 7.3  - cont basal / bolus insulin  - endo note appreciated    Problem: SBO (small bowel obstruction)  Assessment and Plan: - SBO s/p ex-lap with findings of healthy bowel c/b evisceration of bowel requiring placement of retention sutures  - sx evaluated wound - retention sutures removed   - cont dressing to wound change 2x day    Problem: Rheumatoid arthritis  Assessment and Plan: - cont prednisone  - pain medications PRN    Problem: Encounter for palliative care  Assessment and Plan: - DNR/DNI at this time as pt does not want tracheostomy  - palliative and ethics note appreciated    Problem: Prophylactic measure  Assessment and Plan: - heparin

## 2019-03-14 NOTE — CHART NOTE - NSCHARTNOTEFT_GEN_A_CORE
Source: Patient ,    Family , nursing, NP, and EMR    Diet : Mechanical Soft - Consistent Carbohydrate ( Evening Snack) Supplement - Glucerna Shake 2/d     Patient reports dislike to Mechanical soft texture and meals , noted the swallow evaluation recommendation from 3/9/19 for soft solids and met with TEAM , addressed the issue, diet adjusted to swallow evaluation recommendation - Soft texture . Pt alert, oriented , abdominal wound continues . Pt. DNI / DNR      Current Weight: - 99.2 kg on 3/13/19; Admit wt. - 112.2 kg ( intentional wt. loss) IBW - 47.7 kg     Pertinent Medications: MEDICATIONS  (STANDING):  ascorbic acid Syrup 1000 milliGRAM(s) Oral once  chlorhexidine 4% Liquid 1 Application(s) Topical <User Schedule>  collagenase Ointment 1 Application(s) Topical daily  dextrose 5%. 1000 milliLiter(s) (50 mL/Hr) IV Continuous <Continuous>  dextrose 50% Injectable 12.5 Gram(s) IV Push once  dextrose 50% Injectable 25 Gram(s) IV Push once  dextrose 50% Injectable 25 Gram(s) IV Push once  fentaNYL   Patch  25 MICROgram(s)/Hr 1 Patch Transdermal every 72 hours  furosemide    Tablet 40 milliGRAM(s) Oral two times a day  gabapentin 300 milliGRAM(s) Oral three times a day  heparin  Injectable 7500 Unit(s) SubCutaneous every 8 hours  insulin glargine Injectable (LANTUS) 28 Unit(s) SubCutaneous at bedtime  insulin lispro (HumaLOG) corrective regimen sliding scale   SubCutaneous three times a day before meals  insulin lispro (HumaLOG) corrective regimen sliding scale   SubCutaneous at bedtime  insulin lispro Injectable (HumaLOG) 11 Unit(s) SubCutaneous <User Schedule>  insulin lispro Injectable (HumaLOG) 13 Unit(s) SubCutaneous with breakfast  lactobacillus acidophilus 1 Tablet(s) Oral three times a day with meals  levothyroxine 137 MICROGram(s) Oral <User Schedule>  naproxen 500 milliGRAM(s) Oral two times a day  pantoprazole    Tablet 40 milliGRAM(s) Oral before breakfast  potassium chloride   Powder 40 milliEquivalent(s) Oral daily  predniSONE   Tablet 10 milliGRAM(s) Oral daily  psyllium Powder 1 Packet(s) Oral daily  sildenafil (REVATIO) 20 milliGRAM(s) Oral three times a day  simvastatin 20 milliGRAM(s) Oral at bedtime  traMADol 50 milliGRAM(s) Oral two times a day    MEDICATIONS  (PRN):  acetaminophen   Tablet .. 650 milliGRAM(s) Oral every 6 hours PRN Temp greater or equal to 38C (100.4F), Moderate Pain (4 - 6)  dextrose 40% Gel 15 Gram(s) Oral once PRN Blood Glucose LESS THAN 70 milliGRAM(s)/deciLiter  glucagon  Injectable 1 milliGRAM(s) IntraMuscular once PRN Glucose <70 milliGRAM(s)/deciLiter  oxyCODONE    IR 5 milliGRAM(s) Oral every 4 hours PRN moderate and severe pain    Pertinent Labs:  03-13 Na135 mmol/L Glu 112 mg/dL<H> K+ 4.7 mmol/L Cr  1.19 mg/dL BUN 35 mg/dL<H>      Recommend Soft - Consistent Carbohydrate ( Evening Snack ) diet and supplement     Monitoring and Evaluation:  PO intake , Tolerance to diet prescription, weights and follow up per protocol

## 2019-03-14 NOTE — PROVIDER CONTACT NOTE (OTHER) - RECOMMENDATIONS
Administer tylenol as ordered.
continue recommended dose for now
notify provider
possibly place bedtime sliding scale order
ADS aware
IV tylenol given for pain and fever
MD at bedside
MD came to see patient and informed nurse to keep watching site, informed not problematic @ this time.
MD notified
Metropolol as ordered to be administered  Possible dosage adjustment of anti-hypertensive meds?  MD TO bedside
Plan family meeting.   Family assist with encouraging patient to accept care and better understand the plan of care, interventions and importance/benefits of these interventions.
Provider made aware.
Tylenol
admin tylenol
admin tylenol
hold insulin, restart tubefeeds,
pa assess.

## 2019-03-14 NOTE — PROVIDER CONTACT NOTE (OTHER) - DATE AND TIME:
02-Jan-2019 20:00
02-Mar-2019 05:22
04-Jan-2019 09:08
04-Jan-2019 17:55
04-Jan-2019 22:27
05-Feb-2019 00:30
05-Jan-2019 00:13
05-Jan-2019 01:35
05-Mar-2019 04:44
05-Mar-2019 22:50
06-Mar-2019 21:30
12-Jan-2019 12:27
13-Mar-2019 17:00
14-Jan-2019 18:00
14-Mar-2019 09:15
20-Dec-2018 16:10
22-Dec-2018 01:30
22-Dec-2018 06:00
23-Dec-2018 01:30
26-Dec-2018 23:00
28-Feb-2019 06:10
05-Mar-2019

## 2019-03-14 NOTE — PROVIDER CONTACT NOTE (OTHER) - BACKGROUND
Pt was febrile overnight and noted lethargic.
63 yr F, small bowel obstruction
63 yr F, small bowel obstruction
Admitted for resp failure, multifocal pna, pHTN
Admitted for resp failure, multifocal pna, pHTN
Admitted for respiratory failure in setting of multifocal PNA, pHTN
Patient admitted for small bowel obstruction/respiratory failure
Patient intubated s/p resp failure, eviseration, SBR,
Pt dx with small bowel obstruction post ex-lap lysis of adhesions and RTOR for eviscerated bowel contents. Currently in surgical ICU being treated with antibiotics, dx with PNA.
Pt noted febrile previous day and 1 set of blood drawn.
Pt s/p ex laprotomy
Pt s/plaporotomy
STATUS POST EX-LAP SHANNON
patient is on tube feeds, and steroids getting sliding scale plus humalin 20units q6h
pt admitted for small bowel obstruction with recent evisceration of surgical incision.
pt admitted with small bowel obstruction with evisceration of surgical wound.
pt admitted with small bowel obstruction with evisceration of surgical wound.
respiratory failure
s/p
s/p ex-lap, extensive SHANNON, eviscerations, multiple intubations. Hx of Flu, unable to wean to extubate at this time.
s/p exp. lap

## 2019-03-14 NOTE — GOALS OF CARE CONVERSATION - PERSONAL ADVANCE DIRECTIVE - CONVERSATION DETAILS
I again had a conversation with Ms. Morrow regarding tracheostomy if she were to decompensate.  This time, patient's son, Norris, was in the room and participating in the conversation.  Monique Chino NP, present as well.  Ms Morrow again clearly stated that she would not want to live with a tracheostomy, she would never want to live if unable to speak or eat or have to live in a nursing home.  After hearing his mother's words, Norris stated that he now has fully understood his mother's wishes and accepts the DNR.    Ms. Morrow also expressed that she does want her medical information shared with both her daughter, Heavenly, and her son, Norris
Pt is doing very well, on nasal cannula today  OOB. Worked with PT yesterday.    I had a long d/w pt today, again about her GOC.  We discussed that she is doing very well, and that our goal is to have her continue to do well and plan for d/c to rehab later this week/next week.  However, that we also need to know her wishes in the event that she decompensates. Does she want to live with a trach, in a nursing home, poss unable to eat or speak. Loren states that she will not consent for a trach. She understands that this means that we would not re-intubate her, and she stated clearly that she is ok with that. Witnessed by pulmonary fellow, as well. And, pt's son, Hleena, heard the conversation on the phone.    I also spoke with Heavenly, pt's daughter and HCP. She feels that , although her mother will not say it, because she is superstitious and feels it will cause bad outcome, she does not want to be trached and live like that. She said her mom's power is her talking and her independence, and she would never want to live without that.    Therefore, given above and all prior conversations, pt is DNI.    Hopefully, as she cont to do well, plan for d/c to [facility] soon.  Heavenly said she is coming up again on thursday and will look at facilities.      I was physically present for the key portions of the evaluation and management (E/M) service provided.  I agree with the above history, physical, and plan which I have reviewed and edited where appropriate.
Ethics, pulmonary and palliative care team spoke with pt and family re: overall medical condition and addressed goc  Pt with poor pulmonary function and at risk of another respiratory event  Options discussed with pt including: no reintubation and focus on symptom management/ comfort vs reintubation with tracheostomy (as pt has been reintubated too many times there is risk of harm to trachea).  If no decision is made, then it would be deferred to physicians who focus on comfort rather than reintubation.  both sons and daughters were aware of above and are to discuss with each other and their siblings in support of the patient's decision  will follow up in am

## 2019-03-14 NOTE — PROGRESS NOTE ADULT - SUBJECTIVE AND OBJECTIVE BOX
CHIEF COMPLAINT: Patient is a 63y old  Female who presents with a chief complaint of Patient is a 63y old  Female who presents with a chief complaint of Respiratory Failure (08 Mar 2019 13:03) (11 Mar 2019 08:51)    Interval Events:      REVIEW OF SYSTEMS:  Constitutional:   Eyes:  ENT:  CV:  Resp:  GI:  :  MSK:  Integumentary:  Neurological:  Psychiatric:  Endocrine:  Hematologic/Lymphatic:  Allergic/Immunologic:  [ ] All other systems negative  [ ] Unable to assess ROS because ________      OBJECTIVE:  ICU Vital Signs Last 24 Hrs  T(C): 36.6 (14 Mar 2019 06:12), Max: 37.2 (13 Mar 2019 15:52)  T(F): 97.8 (14 Mar 2019 06:12), Max: 98.9 (13 Mar 2019 15:52)  HR: 91 (14 Mar 2019 06:27) (85 - 105)  BP: 133/88 (14 Mar 2019 06:12) (133/88 - 147/82)  BP(mean): --  ABP: --  ABP(mean): --  RR: 18 (14 Mar 2019 06:12) (18 - 18)  SpO2: 100% (14 Mar 2019 06:27) (95% - 100%)    POCT Blood Glucose.: 123 mg/dL (13 Mar 2019 22:06)    HOSPITAL MEDICATIONS:  MEDICATIONS  (STANDING):  ascorbic acid Syrup 1000 milliGRAM(s) Oral once  chlorhexidine 4% Liquid 1 Application(s) Topical <User Schedule>  collagenase Ointment 1 Application(s) Topical daily  dextrose 5%. 1000 milliLiter(s) (50 mL/Hr) IV Continuous <Continuous>  dextrose 50% Injectable 12.5 Gram(s) IV Push once  dextrose 50% Injectable 25 Gram(s) IV Push once  dextrose 50% Injectable 25 Gram(s) IV Push once  fentaNYL   Patch  25 MICROgram(s)/Hr 1 Patch Transdermal every 72 hours  furosemide    Tablet 40 milliGRAM(s) Oral two times a day  gabapentin 300 milliGRAM(s) Oral three times a day  heparin  Injectable 7500 Unit(s) SubCutaneous every 8 hours  insulin glargine Injectable (LANTUS) 28 Unit(s) SubCutaneous at bedtime  insulin lispro (HumaLOG) corrective regimen sliding scale   SubCutaneous three times a day before meals  insulin lispro (HumaLOG) corrective regimen sliding scale   SubCutaneous at bedtime  insulin lispro Injectable (HumaLOG) 11 Unit(s) SubCutaneous <User Schedule>  insulin lispro Injectable (HumaLOG) 13 Unit(s) SubCutaneous with breakfast  lactobacillus acidophilus 1 Tablet(s) Oral three times a day with meals  levothyroxine 137 MICROGram(s) Oral <User Schedule>  naproxen 500 milliGRAM(s) Oral two times a day  pantoprazole    Tablet 40 milliGRAM(s) Oral before breakfast  potassium chloride   Powder 40 milliEquivalent(s) Oral daily  predniSONE   Tablet 10 milliGRAM(s) Oral daily  psyllium Powder 1 Packet(s) Oral daily  sildenafil (REVATIO) 20 milliGRAM(s) Oral three times a day  simvastatin 20 milliGRAM(s) Oral at bedtime  traMADol 50 milliGRAM(s) Oral two times a day    MEDICATIONS  (PRN):  acetaminophen   Tablet .. 650 milliGRAM(s) Oral every 6 hours PRN Temp greater or equal to 38C (100.4F), Moderate Pain (4 - 6)  dextrose 40% Gel 15 Gram(s) Oral once PRN Blood Glucose LESS THAN 70 milliGRAM(s)/deciLiter  glucagon  Injectable 1 milliGRAM(s) IntraMuscular once PRN Glucose <70 milliGRAM(s)/deciLiter  oxyCODONE    IR 5 milliGRAM(s) Oral every 4 hours PRN moderate and severe pain      LABS:                        9.5    11.49 )-----------( 467      ( 13 Mar 2019 06:06 )             31.8     03-13    135  |  96<L>  |  35<H>  ----------------------------<  112<H>  4.7   |  25  |  1.19    Ca    10.0      13 Mar 2019 06:06  Mg     1.6     03-13                MICROBIOLOGY:     RADIOLOGY:  [ ] Reviewed and interpreted by me    PULMONARY FUNCTION TESTS:    EKG: CHIEF COMPLAINT: Patient is a 63y old  Female who presents with a chief complaint of Patient is a 63y old  Female who presents with a chief complaint of Respiratory Failure (08 Mar 2019 13:03) (11 Mar 2019 08:51)    Interval Events: none overnight      REVIEW OF SYSTEMS:  Constitutional: no complaints  CV: denies  Resp: denies  GI: denies  [x] All other systems negative  [ ] Unable to assess ROS because ________      OBJECTIVE:  ICU Vital Signs Last 24 Hrs  T(C): 36.6 (14 Mar 2019 06:12), Max: 37.2 (13 Mar 2019 15:52)  T(F): 97.8 (14 Mar 2019 06:12), Max: 98.9 (13 Mar 2019 15:52)  HR: 91 (14 Mar 2019 06:27) (85 - 105)  BP: 133/88 (14 Mar 2019 06:12) (133/88 - 147/82)  BP(mean): --  ABP: --  ABP(mean): --  RR: 18 (14 Mar 2019 06:12) (18 - 18)  SpO2: 100% (14 Mar 2019 06:27) (95% - 100%)    POCT Blood Glucose.: 123 mg/dL (13 Mar 2019 22:06)    HOSPITAL MEDICATIONS:  MEDICATIONS  (STANDING):  ascorbic acid Syrup 1000 milliGRAM(s) Oral once  chlorhexidine 4% Liquid 1 Application(s) Topical <User Schedule>  collagenase Ointment 1 Application(s) Topical daily  dextrose 5%. 1000 milliLiter(s) (50 mL/Hr) IV Continuous <Continuous>  dextrose 50% Injectable 12.5 Gram(s) IV Push once  dextrose 50% Injectable 25 Gram(s) IV Push once  dextrose 50% Injectable 25 Gram(s) IV Push once  fentaNYL   Patch  25 MICROgram(s)/Hr 1 Patch Transdermal every 72 hours  furosemide    Tablet 40 milliGRAM(s) Oral two times a day  gabapentin 300 milliGRAM(s) Oral three times a day  heparin  Injectable 7500 Unit(s) SubCutaneous every 8 hours  insulin glargine Injectable (LANTUS) 28 Unit(s) SubCutaneous at bedtime  insulin lispro (HumaLOG) corrective regimen sliding scale   SubCutaneous three times a day before meals  insulin lispro (HumaLOG) corrective regimen sliding scale   SubCutaneous at bedtime  insulin lispro Injectable (HumaLOG) 11 Unit(s) SubCutaneous <User Schedule>  insulin lispro Injectable (HumaLOG) 13 Unit(s) SubCutaneous with breakfast  lactobacillus acidophilus 1 Tablet(s) Oral three times a day with meals  levothyroxine 137 MICROGram(s) Oral <User Schedule>  naproxen 500 milliGRAM(s) Oral two times a day  pantoprazole    Tablet 40 milliGRAM(s) Oral before breakfast  potassium chloride   Powder 40 milliEquivalent(s) Oral daily  predniSONE   Tablet 10 milliGRAM(s) Oral daily  psyllium Powder 1 Packet(s) Oral daily  sildenafil (REVATIO) 20 milliGRAM(s) Oral three times a day  simvastatin 20 milliGRAM(s) Oral at bedtime  traMADol 50 milliGRAM(s) Oral two times a day    MEDICATIONS  (PRN):  acetaminophen   Tablet .. 650 milliGRAM(s) Oral every 6 hours PRN Temp greater or equal to 38C (100.4F), Moderate Pain (4 - 6)  dextrose 40% Gel 15 Gram(s) Oral once PRN Blood Glucose LESS THAN 70 milliGRAM(s)/deciLiter  glucagon  Injectable 1 milliGRAM(s) IntraMuscular once PRN Glucose <70 milliGRAM(s)/deciLiter  oxyCODONE    IR 5 milliGRAM(s) Oral every 4 hours PRN moderate and severe pain

## 2019-03-14 NOTE — PROGRESS NOTE ADULT - ATTENDING COMMENTS
Continues to do well  Stable on NC 4lpm, nocturnal bipap  d/c planning to IVELISSE, hopefully tomorrow  please see GO conversation from today

## 2019-03-14 NOTE — PROVIDER CONTACT NOTE (OTHER) - REASON
101.3 temp
102.6 temp
Elevated Heart Rate
High /100,HR 90
Left upper extremity redness
Patient refusing interventions/care
Pt complains of difficulty voiding
Pt with elevated Blood pressure
Pt's temp is 101.0
Pts dressing saturated and an episode on nose bleed
Temp of 100.2, 
Temp of 100.6F
Temp of 101F
blood glucose 397
is it ok to give 10 PM scheduled medicine?
low blood glucose
pt c/o abdominal pain.
pt refused morning blood draw
pt states pain is not being adequately managed
vitals
Pt's Temp is 100.8 and

## 2019-03-14 NOTE — PROVIDER CONTACT NOTE (OTHER) - NAME OF MD/NP/PA/DO NOTIFIED:
ADS- Sow (PA)
Chema SNELL
[facility] [id number]
Cole Nicole MD
Cristofer Hebert
Cristofer Hebert
Dr david Pablo
Frantz NP- ADS
MD Eugene
Manuel SNELL
Monique Chino
Monique MO
[place] [id number]
Priyanka MINA
Rachel Flaherty
SICU team
Tadeo WHITEHEAD
mariya MINA (ads)
roderick celaya
[place] [id number]

## 2019-03-15 LAB
GLUCOSE BLDC GLUCOMTR-MCNC: 156 MG/DL — HIGH (ref 70–99)
GLUCOSE BLDC GLUCOMTR-MCNC: 159 MG/DL — HIGH (ref 70–99)
GLUCOSE BLDC GLUCOMTR-MCNC: 207 MG/DL — HIGH (ref 70–99)
GLUCOSE BLDC GLUCOMTR-MCNC: 96 MG/DL — SIGNIFICANT CHANGE UP (ref 70–99)

## 2019-03-15 PROCEDURE — 99232 SBSQ HOSP IP/OBS MODERATE 35: CPT | Mod: GC

## 2019-03-15 RX ORDER — KETOROLAC TROMETHAMINE 30 MG/ML
15 SYRINGE (ML) INJECTION ONCE
Qty: 0 | Refills: 0 | Status: DISCONTINUED | OUTPATIENT
Start: 2019-03-15 | End: 2019-03-15

## 2019-03-15 RX ADMIN — Medication 15 UNIT(S): at 08:27

## 2019-03-15 RX ADMIN — Medication 15 MILLIGRAM(S): at 00:48

## 2019-03-15 RX ADMIN — CHLORHEXIDINE GLUCONATE 1 APPLICATION(S): 213 SOLUTION TOPICAL at 12:02

## 2019-03-15 RX ADMIN — Medication 15 MILLIGRAM(S): at 00:23

## 2019-03-15 RX ADMIN — TRAMADOL HYDROCHLORIDE 50 MILLIGRAM(S): 50 TABLET ORAL at 17:08

## 2019-03-15 RX ADMIN — Medication 500 MILLIGRAM(S): at 12:00

## 2019-03-15 RX ADMIN — OXYCODONE HYDROCHLORIDE 5 MILLIGRAM(S): 5 TABLET ORAL at 00:48

## 2019-03-15 RX ADMIN — SIMVASTATIN 20 MILLIGRAM(S): 20 TABLET, FILM COATED ORAL at 21:29

## 2019-03-15 RX ADMIN — Medication 20 MILLIGRAM(S): at 21:29

## 2019-03-15 RX ADMIN — Medication 1 TABLET(S): at 08:26

## 2019-03-15 RX ADMIN — PANTOPRAZOLE SODIUM 40 MILLIGRAM(S): 20 TABLET, DELAYED RELEASE ORAL at 06:30

## 2019-03-15 RX ADMIN — OXYCODONE HYDROCHLORIDE 5 MILLIGRAM(S): 5 TABLET ORAL at 14:20

## 2019-03-15 RX ADMIN — Medication 500 MILLIGRAM(S): at 12:30

## 2019-03-15 RX ADMIN — OXYCODONE HYDROCHLORIDE 5 MILLIGRAM(S): 5 TABLET ORAL at 13:50

## 2019-03-15 RX ADMIN — Medication 40 MILLIEQUIVALENT(S): at 12:00

## 2019-03-15 RX ADMIN — Medication 40 MILLIGRAM(S): at 06:30

## 2019-03-15 RX ADMIN — FENTANYL CITRATE 1 PATCH: 50 INJECTION INTRAVENOUS at 12:03

## 2019-03-15 RX ADMIN — Medication 1 APPLICATION(S): at 12:02

## 2019-03-15 RX ADMIN — OXYCODONE HYDROCHLORIDE 5 MILLIGRAM(S): 5 TABLET ORAL at 19:06

## 2019-03-15 RX ADMIN — TRAMADOL HYDROCHLORIDE 50 MILLIGRAM(S): 50 TABLET ORAL at 06:28

## 2019-03-15 RX ADMIN — GABAPENTIN 300 MILLIGRAM(S): 400 CAPSULE ORAL at 13:50

## 2019-03-15 RX ADMIN — FENTANYL CITRATE 1 PATCH: 50 INJECTION INTRAVENOUS at 08:11

## 2019-03-15 RX ADMIN — GABAPENTIN 300 MILLIGRAM(S): 400 CAPSULE ORAL at 21:29

## 2019-03-15 RX ADMIN — GABAPENTIN 300 MILLIGRAM(S): 400 CAPSULE ORAL at 06:31

## 2019-03-15 RX ADMIN — OXYCODONE HYDROCHLORIDE 5 MILLIGRAM(S): 5 TABLET ORAL at 18:46

## 2019-03-15 RX ADMIN — Medication 500 MILLIGRAM(S): at 22:59

## 2019-03-15 RX ADMIN — Medication 40 MILLIGRAM(S): at 17:09

## 2019-03-15 RX ADMIN — OXYCODONE HYDROCHLORIDE 5 MILLIGRAM(S): 5 TABLET ORAL at 22:59

## 2019-03-15 RX ADMIN — Medication 11 UNIT(S): at 17:08

## 2019-03-15 RX ADMIN — Medication 1 TABLET(S): at 17:09

## 2019-03-15 RX ADMIN — Medication 2: at 08:26

## 2019-03-15 RX ADMIN — Medication 4: at 12:02

## 2019-03-15 RX ADMIN — Medication 20 MILLIGRAM(S): at 06:30

## 2019-03-15 RX ADMIN — FENTANYL CITRATE 1 PATCH: 50 INJECTION INTRAVENOUS at 19:53

## 2019-03-15 RX ADMIN — Medication 11 UNIT(S): at 12:02

## 2019-03-15 RX ADMIN — TRAMADOL HYDROCHLORIDE 50 MILLIGRAM(S): 50 TABLET ORAL at 18:46

## 2019-03-15 RX ADMIN — Medication 1 TABLET(S): at 12:00

## 2019-03-15 RX ADMIN — Medication 10 MILLIGRAM(S): at 06:30

## 2019-03-15 RX ADMIN — Medication 2: at 17:08

## 2019-03-15 RX ADMIN — TRAMADOL HYDROCHLORIDE 50 MILLIGRAM(S): 50 TABLET ORAL at 07:05

## 2019-03-15 RX ADMIN — Medication 20 MILLIGRAM(S): at 13:50

## 2019-03-15 RX ADMIN — OXYCODONE HYDROCHLORIDE 5 MILLIGRAM(S): 5 TABLET ORAL at 00:23

## 2019-03-15 RX ADMIN — Medication 137 MICROGRAM(S): at 06:30

## 2019-03-15 RX ADMIN — INSULIN GLARGINE 28 UNIT(S): 100 INJECTION, SOLUTION SUBCUTANEOUS at 21:29

## 2019-03-15 NOTE — PROGRESS NOTE ADULT - RS GEN PE MLT RESP DETAILS PC
airway patent/breath sounds equal airway patent/clear to auscultation bilaterally/breath sounds equal/good air movement/respirations non-labored

## 2019-03-15 NOTE — PROGRESS NOTE ADULT - NSICDXPROBLEM_GEN_ALL_CORE_FT
PROBLEM DIAGNOSES  Problem: Acute respiratory failure  Assessment and Plan: - prolonged intubation, now extubated  - BIPAP HS and PRN  - weaned from high flow to nasal cannula, tolerating  - cont sildenafil  - cont steroids  - chest PT    Problem: Enterococcal bacteremia  Assessment and Plan: - had 27 days of Vanc, 3 days of Ceftriaxone, and 8 days of Zosyn for enterococcal bacteremia  - cultures cleared    Problem: ESBL (extended spectrum beta-lactamase) producing bacteria infection  Assessment and Plan: - ESBL in urine  - completed ertapenem on 2/27  - passed TOV    Problem: Type 2 diabetes mellitus  Assessment and Plan: - a1c 7.3  - cont basal / bolus insulin  - endo note appreciated    Problem: SBO (small bowel obstruction)  Assessment and Plan: - SBO s/p ex-lap with findings of healthy bowel c/b evisceration of bowel requiring placement of retention sutures  - sx evaluated wound - retention sutures removed   - cont dressing to wound change 2x day    Problem: Rheumatoid arthritis  Assessment and Plan: - cont prednisone  - pain medications PRN    Problem: Encounter for palliative care  Assessment and Plan: - DNR/DNI at this time as pt does not want tracheostomy  - palliative and ethics note appreciated    Problem: Prophylactic measure  Assessment and Plan: - heparin

## 2019-03-15 NOTE — CONSULT NOTE ADULT - SUBJECTIVE AND OBJECTIVE BOX
Patient son Norris contacting this ethicist with concern that he is not being allowed to recieve his mother's information. Prior ethicist Sneha dumont on case but insisting for this ethicist.  Discussed issues, Norris expressed dissatisfaction and states mother with MOLST at bedside desiring full code.    Discussed with Norris, mother's current condition is stable and entrenchable situation regarding tracheostomy presently not conducive to current plan of care. Current plan of care is to facilitate discharge with placement in subacute nursing facility. Note from Dr. Lisker at goals of care clearly states patient's wishes which are unchanged from last week. Patient would not want tracheostomy or to live on ventilator.    Patient medically clear for discharge with a list of skilled facilities. Norris encouraged with [facility] to choose a placement. MOLST can be addressed and reviewed at skilled nursing facility site. Patient presently with capacity to address her advanced directives with physicians as she has done

## 2019-03-15 NOTE — PROGRESS NOTE ADULT - SUBJECTIVE AND OBJECTIVE BOX
CHIEF COMPLAINT: Patient is a 63y old  Female who presents with a chief complaint of Patient is a 63y old  Female who presents with a chief complaint of Respiratory Failure (08 Mar 2019 13:03) (11 Mar 2019 08:51)    Interval Events:      REVIEW OF SYSTEMS:  Constitutional:   Eyes:  ENT:  CV:  Resp:  GI:  :  MSK:  Integumentary:  Neurological:  Psychiatric:  Endocrine:  Hematologic/Lymphatic:  Allergic/Immunologic:  [ ] All other systems negative  [ ] Unable to assess ROS because ________      OBJECTIVE:  ICU Vital Signs Last 24 Hrs  T(C): 36.3 (15 Mar 2019 06:31), Max: 36.8 (14 Mar 2019 14:35)  T(F): 97.3 (15 Mar 2019 06:31), Max: 98.3 (14 Mar 2019 14:35)  HR: 91 (15 Mar 2019 07:13) (86 - 115)  BP: 124/88 (15 Mar 2019 06:31) (124/88 - 138/87)  BP(mean): --  ABP: --  ABP(mean): --  RR: 20 (15 Mar 2019 06:31) (18 - 20)  SpO2: 100% (15 Mar 2019 07:13) (93% - 100%)    POCT Blood Glucose.: 144 mg/dL (14 Mar 2019 21:42)    HOSPITAL MEDICATIONS:  MEDICATIONS  (STANDING):  ascorbic acid Syrup 1000 milliGRAM(s) Oral once  chlorhexidine 4% Liquid 1 Application(s) Topical <User Schedule>  collagenase Ointment 1 Application(s) Topical daily  dextrose 5%. 1000 milliLiter(s) (50 mL/Hr) IV Continuous <Continuous>  dextrose 50% Injectable 12.5 Gram(s) IV Push once  dextrose 50% Injectable 25 Gram(s) IV Push once  dextrose 50% Injectable 25 Gram(s) IV Push once  fentaNYL   Patch  25 MICROgram(s)/Hr 1 Patch Transdermal every 72 hours  furosemide    Tablet 40 milliGRAM(s) Oral two times a day  gabapentin 300 milliGRAM(s) Oral three times a day  heparin  Injectable 7500 Unit(s) SubCutaneous every 8 hours  insulin glargine Injectable (LANTUS) 28 Unit(s) SubCutaneous at bedtime  insulin lispro (HumaLOG) corrective regimen sliding scale   SubCutaneous three times a day before meals  insulin lispro (HumaLOG) corrective regimen sliding scale   SubCutaneous at bedtime  insulin lispro Injectable (HumaLOG) 11 Unit(s) SubCutaneous <User Schedule>  insulin lispro Injectable (HumaLOG) 15 Unit(s) SubCutaneous with breakfast  lactobacillus acidophilus 1 Tablet(s) Oral three times a day with meals  levothyroxine 137 MICROGram(s) Oral <User Schedule>  naproxen 500 milliGRAM(s) Oral two times a day  pantoprazole    Tablet 40 milliGRAM(s) Oral before breakfast  potassium chloride   Powder 40 milliEquivalent(s) Oral daily  predniSONE   Tablet 10 milliGRAM(s) Oral daily  psyllium Powder 1 Packet(s) Oral daily  sildenafil (REVATIO) 20 milliGRAM(s) Oral three times a day  simvastatin 20 milliGRAM(s) Oral at bedtime  traMADol 50 milliGRAM(s) Oral two times a day    MEDICATIONS  (PRN):  acetaminophen   Tablet .. 650 milliGRAM(s) Oral every 6 hours PRN Temp greater or equal to 38C (100.4F), Moderate Pain (4 - 6)  dextrose 40% Gel 15 Gram(s) Oral once PRN Blood Glucose LESS THAN 70 milliGRAM(s)/deciLiter  glucagon  Injectable 1 milliGRAM(s) IntraMuscular once PRN Glucose <70 milliGRAM(s)/deciLiter  oxyCODONE    IR 5 milliGRAM(s) Oral every 4 hours PRN moderate and severe pain  sodium chloride 0.65% Nasal 1 Spray(s) Both Nostrils four times a day PRN Nasal Congestion      LABS:                    MICROBIOLOGY:     RADIOLOGY:  [ ] Reviewed and interpreted by me    PULMONARY FUNCTION TESTS:    EKG: CHIEF COMPLAINT: Patient is a 63y old  Female who presents with a chief complaint of Patient is a 63y old  Female who presents with a chief complaint of Respiratory Failure (08 Mar 2019 13:03) (11 Mar 2019 08:51)    Interval Events: none overnight      REVIEW OF SYSTEMS:  Constitutional: leg pain but improved  CV: denies  Resp: denies  GI: denies  [x] All other systems negative  [ ] Unable to assess ROS because ________      OBJECTIVE:  ICU Vital Signs Last 24 Hrs  T(C): 36.3 (15 Mar 2019 06:31), Max: 36.8 (14 Mar 2019 14:35)  T(F): 97.3 (15 Mar 2019 06:31), Max: 98.3 (14 Mar 2019 14:35)  HR: 91 (15 Mar 2019 07:13) (86 - 115)  BP: 124/88 (15 Mar 2019 06:31) (124/88 - 138/87)  BP(mean): --  ABP: --  ABP(mean): --  RR: 20 (15 Mar 2019 06:31) (18 - 20)  SpO2: 100% (15 Mar 2019 07:13) (93% - 100%)    POCT Blood Glucose.: 144 mg/dL (14 Mar 2019 21:42)    HOSPITAL MEDICATIONS:  MEDICATIONS  (STANDING):  ascorbic acid Syrup 1000 milliGRAM(s) Oral once  chlorhexidine 4% Liquid 1 Application(s) Topical <User Schedule>  collagenase Ointment 1 Application(s) Topical daily  dextrose 5%. 1000 milliLiter(s) (50 mL/Hr) IV Continuous <Continuous>  dextrose 50% Injectable 12.5 Gram(s) IV Push once  dextrose 50% Injectable 25 Gram(s) IV Push once  dextrose 50% Injectable 25 Gram(s) IV Push once  fentaNYL   Patch  25 MICROgram(s)/Hr 1 Patch Transdermal every 72 hours  furosemide    Tablet 40 milliGRAM(s) Oral two times a day  gabapentin 300 milliGRAM(s) Oral three times a day  heparin  Injectable 7500 Unit(s) SubCutaneous every 8 hours  insulin glargine Injectable (LANTUS) 28 Unit(s) SubCutaneous at bedtime  insulin lispro (HumaLOG) corrective regimen sliding scale   SubCutaneous three times a day before meals  insulin lispro (HumaLOG) corrective regimen sliding scale   SubCutaneous at bedtime  insulin lispro Injectable (HumaLOG) 11 Unit(s) SubCutaneous <User Schedule>  insulin lispro Injectable (HumaLOG) 15 Unit(s) SubCutaneous with breakfast  lactobacillus acidophilus 1 Tablet(s) Oral three times a day with meals  levothyroxine 137 MICROGram(s) Oral <User Schedule>  naproxen 500 milliGRAM(s) Oral two times a day  pantoprazole    Tablet 40 milliGRAM(s) Oral before breakfast  potassium chloride   Powder 40 milliEquivalent(s) Oral daily  predniSONE   Tablet 10 milliGRAM(s) Oral daily  psyllium Powder 1 Packet(s) Oral daily  sildenafil (REVATIO) 20 milliGRAM(s) Oral three times a day  simvastatin 20 milliGRAM(s) Oral at bedtime  traMADol 50 milliGRAM(s) Oral two times a day    MEDICATIONS  (PRN):  acetaminophen   Tablet .. 650 milliGRAM(s) Oral every 6 hours PRN Temp greater or equal to 38C (100.4F), Moderate Pain (4 - 6)  dextrose 40% Gel 15 Gram(s) Oral once PRN Blood Glucose LESS THAN 70 milliGRAM(s)/deciLiter  glucagon  Injectable 1 milliGRAM(s) IntraMuscular once PRN Glucose <70 milliGRAM(s)/deciLiter  oxyCODONE    IR 5 milliGRAM(s) Oral every 4 hours PRN moderate and severe pain  sodium chloride 0.65% Nasal 1 Spray(s) Both Nostrils four times a day PRN Nasal Congestion

## 2019-03-15 NOTE — PROGRESS NOTE ADULT - MUSCULOSKELETAL
detailed exam diminished strength/decreased ROM no calf tenderness/decreased ROM/no joint swelling/no joint erythema/no joint warmth/diminished strength

## 2019-03-15 NOTE — PROGRESS NOTE ADULT - ATTENDING COMMENTS
Patient remains with stable hemodynamic and respiratory status  Nasal Cannula 2-3lpm, nocturnal bipap - stable settings.  Afebrile, not on abx  Physical exam unchanged.  Patient is stable for discharge to [facility]. She has been accepted by several facilities and insurance auth is in place.  Patient and family aware. Patient remains with stable hemodynamic and respiratory status  Nasal Cannula 2-3lpm, nocturnal bipap - stable settings.  Afebrile, not on abx  Physical exam unchanged.  Patient is stable for discharge to [facility]. She has been accepted by several facilities and insurance auth is in place.  Patient and family aware.  They have agreed to look at the facilities over the weekend and give us there choice Monday morning. D/W pt and Burdick, in the room

## 2019-03-16 LAB
GLUCOSE BLDC GLUCOMTR-MCNC: 134 MG/DL — HIGH (ref 70–99)
GLUCOSE BLDC GLUCOMTR-MCNC: 134 MG/DL — HIGH (ref 70–99)
GLUCOSE BLDC GLUCOMTR-MCNC: 143 MG/DL — HIGH (ref 70–99)
GLUCOSE BLDC GLUCOMTR-MCNC: 159 MG/DL — HIGH (ref 70–99)

## 2019-03-16 PROCEDURE — 99232 SBSQ HOSP IP/OBS MODERATE 35: CPT | Mod: GC

## 2019-03-16 RX ADMIN — Medication 1 TABLET(S): at 12:04

## 2019-03-16 RX ADMIN — Medication 500 MILLIGRAM(S): at 22:39

## 2019-03-16 RX ADMIN — Medication 20 MILLIGRAM(S): at 07:00

## 2019-03-16 RX ADMIN — Medication 1 APPLICATION(S): at 12:04

## 2019-03-16 RX ADMIN — OXYCODONE HYDROCHLORIDE 5 MILLIGRAM(S): 5 TABLET ORAL at 19:42

## 2019-03-16 RX ADMIN — Medication 1 TABLET(S): at 17:06

## 2019-03-16 RX ADMIN — FENTANYL CITRATE 1 PATCH: 50 INJECTION INTRAVENOUS at 07:48

## 2019-03-16 RX ADMIN — INSULIN GLARGINE 28 UNIT(S): 100 INJECTION, SOLUTION SUBCUTANEOUS at 22:40

## 2019-03-16 RX ADMIN — GABAPENTIN 300 MILLIGRAM(S): 400 CAPSULE ORAL at 14:04

## 2019-03-16 RX ADMIN — OXYCODONE HYDROCHLORIDE 5 MILLIGRAM(S): 5 TABLET ORAL at 10:05

## 2019-03-16 RX ADMIN — Medication 137 MICROGRAM(S): at 07:00

## 2019-03-16 RX ADMIN — CHLORHEXIDINE GLUCONATE 1 APPLICATION(S): 213 SOLUTION TOPICAL at 07:59

## 2019-03-16 RX ADMIN — Medication 11 UNIT(S): at 12:04

## 2019-03-16 RX ADMIN — Medication 500 MILLIGRAM(S): at 12:04

## 2019-03-16 RX ADMIN — Medication 40 MILLIGRAM(S): at 07:00

## 2019-03-16 RX ADMIN — Medication 1 TABLET(S): at 07:59

## 2019-03-16 RX ADMIN — GABAPENTIN 300 MILLIGRAM(S): 400 CAPSULE ORAL at 22:41

## 2019-03-16 RX ADMIN — Medication 1 SPRAY(S): at 12:05

## 2019-03-16 RX ADMIN — Medication 10 MILLIGRAM(S): at 07:01

## 2019-03-16 RX ADMIN — GABAPENTIN 300 MILLIGRAM(S): 400 CAPSULE ORAL at 07:01

## 2019-03-16 RX ADMIN — OXYCODONE HYDROCHLORIDE 5 MILLIGRAM(S): 5 TABLET ORAL at 18:15

## 2019-03-16 RX ADMIN — PANTOPRAZOLE SODIUM 40 MILLIGRAM(S): 20 TABLET, DELAYED RELEASE ORAL at 07:01

## 2019-03-16 RX ADMIN — Medication 2: at 12:04

## 2019-03-16 RX ADMIN — Medication 11 UNIT(S): at 17:07

## 2019-03-16 RX ADMIN — Medication 20 MILLIGRAM(S): at 14:04

## 2019-03-16 RX ADMIN — HEPARIN SODIUM 7500 UNIT(S): 5000 INJECTION INTRAVENOUS; SUBCUTANEOUS at 22:40

## 2019-03-16 RX ADMIN — TRAMADOL HYDROCHLORIDE 50 MILLIGRAM(S): 50 TABLET ORAL at 07:01

## 2019-03-16 RX ADMIN — OXYCODONE HYDROCHLORIDE 5 MILLIGRAM(S): 5 TABLET ORAL at 22:40

## 2019-03-16 RX ADMIN — TRAMADOL HYDROCHLORIDE 50 MILLIGRAM(S): 50 TABLET ORAL at 17:10

## 2019-03-16 RX ADMIN — Medication 40 MILLIGRAM(S): at 17:06

## 2019-03-16 RX ADMIN — SIMVASTATIN 20 MILLIGRAM(S): 20 TABLET, FILM COATED ORAL at 22:40

## 2019-03-16 RX ADMIN — OXYCODONE HYDROCHLORIDE 5 MILLIGRAM(S): 5 TABLET ORAL at 10:35

## 2019-03-16 RX ADMIN — FENTANYL CITRATE 1 PATCH: 50 INJECTION INTRAVENOUS at 20:48

## 2019-03-16 RX ADMIN — TRAMADOL HYDROCHLORIDE 50 MILLIGRAM(S): 50 TABLET ORAL at 18:15

## 2019-03-16 RX ADMIN — Medication 40 MILLIEQUIVALENT(S): at 12:05

## 2019-03-16 RX ADMIN — Medication 20 MILLIGRAM(S): at 22:39

## 2019-03-16 RX ADMIN — Medication 15 UNIT(S): at 08:00

## 2019-03-16 NOTE — PROGRESS NOTE ADULT - SUBJECTIVE AND OBJECTIVE BOX
CHIEF COMPLAINT: Patient is a 63y old  Female who presents with a chief complaint of Patient is a 63y old  Female who presents with a chief complaint of Respiratory Failure (08 Mar 2019 13:03) (11 Mar 2019 08:51)    Interval Events: none overnight      REVIEW OF SYSTEMS:  Constitutional: leg pain but improved  CV: denies  Resp: denies  GI: denies  [x] All other systems negative  [ ] Unable to assess ROS because ________      OBJECTIVE:  ICU Vital Signs Last 24 Hrs  T(C): 36.3 (15 Mar 2019 06:31), Max: 36.8 (14 Mar 2019 14:35)  T(F): 97.3 (15 Mar 2019 06:31), Max: 98.3 (14 Mar 2019 14:35)  HR: 91 (15 Mar 2019 07:13) (86 - 115)  BP: 124/88 (15 Mar 2019 06:31) (124/88 - 138/87)  BP(mean): --  ABP: --  ABP(mean): --  RR: 20 (15 Mar 2019 06:31) (18 - 20)  SpO2: 100% (15 Mar 2019 07:13) (93% - 100%)    POCT Blood Glucose.: 144 mg/dL (14 Mar 2019 21:42)    HOSPITAL MEDICATIONS:  MEDICATIONS  (STANDING):  ascorbic acid Syrup 1000 milliGRAM(s) Oral once  chlorhexidine 4% Liquid 1 Application(s) Topical <User Schedule>  collagenase Ointment 1 Application(s) Topical daily  dextrose 5%. 1000 milliLiter(s) (50 mL/Hr) IV Continuous <Continuous>  dextrose 50% Injectable 12.5 Gram(s) IV Push once  dextrose 50% Injectable 25 Gram(s) IV Push once  dextrose 50% Injectable 25 Gram(s) IV Push once  fentaNYL   Patch  25 MICROgram(s)/Hr 1 Patch Transdermal every 72 hours  furosemide    Tablet 40 milliGRAM(s) Oral two times a day  gabapentin 300 milliGRAM(s) Oral three times a day  heparin  Injectable 7500 Unit(s) SubCutaneous every 8 hours  insulin glargine Injectable (LANTUS) 28 Unit(s) SubCutaneous at bedtime  insulin lispro (HumaLOG) corrective regimen sliding scale   SubCutaneous three times a day before meals  insulin lispro (HumaLOG) corrective regimen sliding scale   SubCutaneous at bedtime  insulin lispro Injectable (HumaLOG) 11 Unit(s) SubCutaneous <User Schedule>  insulin lispro Injectable (HumaLOG) 15 Unit(s) SubCutaneous with breakfast  lactobacillus acidophilus 1 Tablet(s) Oral three times a day with meals  levothyroxine 137 MICROGram(s) Oral <User Schedule>  naproxen 500 milliGRAM(s) Oral two times a day  pantoprazole    Tablet 40 milliGRAM(s) Oral before breakfast  potassium chloride   Powder 40 milliEquivalent(s) Oral daily  predniSONE   Tablet 10 milliGRAM(s) Oral daily  psyllium Powder 1 Packet(s) Oral daily  sildenafil (REVATIO) 20 milliGRAM(s) Oral three times a day  simvastatin 20 milliGRAM(s) Oral at bedtime  traMADol 50 milliGRAM(s) Oral two times a day    MEDICATIONS  (PRN):  acetaminophen   Tablet .. 650 milliGRAM(s) Oral every 6 hours PRN Temp greater or equal to 38C (100.4F), Moderate Pain (4 - 6)  dextrose 40% Gel 15 Gram(s) Oral once PRN Blood Glucose LESS THAN 70 milliGRAM(s)/deciLiter  glucagon  Injectable 1 milliGRAM(s) IntraMuscular once PRN Glucose <70 milliGRAM(s)/deciLiter  oxyCODONE    IR 5 milliGRAM(s) Oral every 4 hours PRN moderate and severe pain  sodium chloride 0.65% Nasal 1 Spray(s) Both Nostrils four times a day PRN Nasal Congestion CHIEF COMPLAINT:    Interval Events:    REVIEW OF SYSTEMS:  Constitutional: feels well   CV: denies chest pain or press  Resp:  GI:  :  MSK:  Integumentary:  Neurological:  Psychiatric:  Endocrine:  Hematologic/Lymphatic:  Allergic/Immunologic:  [ ] All other systems negative  [ ] Unable to assess ROS because ________    OBJECTIVE:  ICU Vital Signs Last 24 Hrs  T(C): 36.5 (16 Mar 2019 06:58), Max: 36.9 (15 Mar 2019 21:24)  T(F): 97.7 (16 Mar 2019 06:58), Max: 98.5 (15 Mar 2019 21:24)  HR: 96 (16 Mar 2019 11:03) (80 - 112)  BP: 126/77 (16 Mar 2019 06:58) (115/73 - 146/80)  BP(mean): --  ABP: --  ABP(mean): --  RR: 20 (16 Mar 2019 06:58) (19 - 20)  SpO2: 96% (16 Mar 2019 11:03) (94% - 100%)        CAPILLARY BLOOD GLUCOSE      POCT Blood Glucose.: 159 mg/dL (16 Mar 2019 11:53)        HOSPITAL MEDICATIONS:  MEDICATIONS  (STANDING):  ascorbic acid Syrup 1000 milliGRAM(s) Oral once  chlorhexidine 4% Liquid 1 Application(s) Topical <User Schedule>  collagenase Ointment 1 Application(s) Topical daily  dextrose 5%. 1000 milliLiter(s) (50 mL/Hr) IV Continuous <Continuous>  dextrose 50% Injectable 12.5 Gram(s) IV Push once  dextrose 50% Injectable 25 Gram(s) IV Push once  dextrose 50% Injectable 25 Gram(s) IV Push once  fentaNYL   Patch  25 MICROgram(s)/Hr 1 Patch Transdermal every 72 hours  furosemide    Tablet 40 milliGRAM(s) Oral two times a day  gabapentin 300 milliGRAM(s) Oral three times a day  heparin  Injectable 7500 Unit(s) SubCutaneous every 8 hours  insulin glargine Injectable (LANTUS) 28 Unit(s) SubCutaneous at bedtime  insulin lispro (HumaLOG) corrective regimen sliding scale   SubCutaneous three times a day before meals  insulin lispro (HumaLOG) corrective regimen sliding scale   SubCutaneous at bedtime  insulin lispro Injectable (HumaLOG) 11 Unit(s) SubCutaneous <User Schedule>  insulin lispro Injectable (HumaLOG) 15 Unit(s) SubCutaneous with breakfast  lactobacillus acidophilus 1 Tablet(s) Oral three times a day with meals  levothyroxine 137 MICROGram(s) Oral <User Schedule>  naproxen 500 milliGRAM(s) Oral two times a day  pantoprazole    Tablet 40 milliGRAM(s) Oral before breakfast  potassium chloride   Powder 40 milliEquivalent(s) Oral daily  predniSONE   Tablet 10 milliGRAM(s) Oral daily  psyllium Powder 1 Packet(s) Oral daily  sildenafil (REVATIO) 20 milliGRAM(s) Oral three times a day  simvastatin 20 milliGRAM(s) Oral at bedtime  traMADol 50 milliGRAM(s) Oral two times a day    MEDICATIONS  (PRN):  acetaminophen   Tablet .. 650 milliGRAM(s) Oral every 6 hours PRN Temp greater or equal to 38C (100.4F), Moderate Pain (4 - 6)  dextrose 40% Gel 15 Gram(s) Oral once PRN Blood Glucose LESS THAN 70 milliGRAM(s)/deciLiter  glucagon  Injectable 1 milliGRAM(s) IntraMuscular once PRN Glucose <70 milliGRAM(s)/deciLiter  oxyCODONE    IR 5 milliGRAM(s) Oral every 4 hours PRN moderate and severe pain  sodium chloride 0.65% Nasal 1 Spray(s) Both Nostrils four times a day PRN Nasal Congestion      LABS:                    MICROBIOLOGY:     RADIOLOGY:  [ ] Reviewed and interpreted by me    PULMONARY FUNCTION TESTS:    EKG:CHIEF COMPLAINT: Patient is a 63y old  Female who presents with a chief complaint of Patient is a 63y old  Female who presents with a chief complaint of Respiratory Failure (08 Mar 2019 13:03) (11 Mar 2019 08:51)    Interval Events: none overnight      REVIEW OF SYSTEMS:  Constitutional: leg pain but improved  CV: denies  Resp: denies  GI: denies  [x] All other systems negative  [ ] Unable to assess ROS because ________ CHIEF COMPLAINT: Patient is a 63y old  Female who presents with a chief complaint of SBO (15 Mar 2019 12:20)      Interval Events: using BIPAP overnight    REVIEW OF SYSTEMS:  Constitutional: feels well   CV: denies chest pain or pressure  Resp: denies SOB  [x] All other systems negative      OBJECTIVE:  ICU Vital Signs Last 24 Hrs  T(C): 36.5 (16 Mar 2019 06:58), Max: 36.9 (15 Mar 2019 21:24)  T(F): 97.7 (16 Mar 2019 06:58), Max: 98.5 (15 Mar 2019 21:24)  HR: 96 (16 Mar 2019 11:03) (80 - 112)  BP: 126/77 (16 Mar 2019 06:58) (115/73 - 146/80)  RR: 20 (16 Mar 2019 06:58) (19 - 20)  SpO2: 96% (16 Mar 2019 11:03) (94% - 100%)      POCT Blood Glucose.: 159 mg/dL (16 Mar 2019 11:53)        HOSPITAL MEDICATIONS:  MEDICATIONS  (STANDING):  ascorbic acid Syrup 1000 milliGRAM(s) Oral once  chlorhexidine 4% Liquid 1 Application(s) Topical <User Schedule>  collagenase Ointment 1 Application(s) Topical daily  dextrose 5%. 1000 milliLiter(s) (50 mL/Hr) IV Continuous <Continuous>  dextrose 50% Injectable 12.5 Gram(s) IV Push once  dextrose 50% Injectable 25 Gram(s) IV Push once  dextrose 50% Injectable 25 Gram(s) IV Push once  fentaNYL   Patch  25 MICROgram(s)/Hr 1 Patch Transdermal every 72 hours  furosemide    Tablet 40 milliGRAM(s) Oral two times a day  gabapentin 300 milliGRAM(s) Oral three times a day  heparin  Injectable 7500 Unit(s) SubCutaneous every 8 hours  insulin glargine Injectable (LANTUS) 28 Unit(s) SubCutaneous at bedtime  insulin lispro (HumaLOG) corrective regimen sliding scale   SubCutaneous three times a day before meals  insulin lispro (HumaLOG) corrective regimen sliding scale   SubCutaneous at bedtime  insulin lispro Injectable (HumaLOG) 11 Unit(s) SubCutaneous <User Schedule>  insulin lispro Injectable (HumaLOG) 15 Unit(s) SubCutaneous with breakfast  lactobacillus acidophilus 1 Tablet(s) Oral three times a day with meals  levothyroxine 137 MICROGram(s) Oral <User Schedule>  naproxen 500 milliGRAM(s) Oral two times a day  pantoprazole    Tablet 40 milliGRAM(s) Oral before breakfast  potassium chloride   Powder 40 milliEquivalent(s) Oral daily  predniSONE   Tablet 10 milliGRAM(s) Oral daily  psyllium Powder 1 Packet(s) Oral daily  sildenafil (REVATIO) 20 milliGRAM(s) Oral three times a day  simvastatin 20 milliGRAM(s) Oral at bedtime  traMADol 50 milliGRAM(s) Oral two times a day    MEDICATIONS  (PRN):  acetaminophen   Tablet .. 650 milliGRAM(s) Oral every 6 hours PRN Temp greater or equal to 38C (100.4F), Moderate Pain (4 - 6)  dextrose 40% Gel 15 Gram(s) Oral once PRN Blood Glucose LESS THAN 70 milliGRAM(s)/deciLiter  glucagon  Injectable 1 milliGRAM(s) IntraMuscular once PRN Glucose <70 milliGRAM(s)/deciLiter  oxyCODONE    IR 5 milliGRAM(s) Oral every 4 hours PRN moderate and severe pain  sodium chloride 0.65% Nasal 1 Spray(s) Both Nostrils four times a day PRN Nasal Congestion

## 2019-03-16 NOTE — PROGRESS NOTE ADULT - ASSESSMENT
63 year old female with PMHx of HTN, DM, hypothyroidism, RA (prednisone 10mg), pulmonary HTN, CHRIS, and COPD (3L at home) who was originally admitted on 12/15 for SBO s/p ex lap with lysis of adhesions (4 retention sutures) c/b 12/30 she eviscerated 2/2 a coughing episode s/p ex lap (6 retention sutures) c/b multifocal pneumonia, developed hypercapnic respiratory failure requiring intubation 1/6, s/p bronch 1/9/18, and extubated on 1/24, course further c/b Enterococci bacteremia now readmitted to MICU for hypoxic respiratory failure 2/2 atelectasis requiring intubation 01/31. Had grown ESBL in urine, s/p ertapenem.  Extubated to high flow/ BIPAP. 63 year old female with PMHx of HTN, DM, hypothyroidism, RA (prednisone 10mg), pulmonary HTN, CHRIS, and COPD (3L at home) who was originally admitted on 12/15 for SBO s/p ex lap with lysis of adhesions (4 retention sutures) c/b 12/30 she eviscerated 2/2 a coughing episode s/p ex lap (6 retention sutures) c/b multifocal pneumonia, developed hypercapnic respiratory failure requiring intubation 1/6, s/p bronch 1/9/18, and extubated on 1/24, course further c/b Enterococci bacteremia now readmitted to MICU for hypoxic respiratory failure 2/2 atelectasis requiring intubation 01/31. Had grown ESBL in urine, s/p ertapenem.  Extubated to high flow/ BIPAP.    Problem Selector:  PROBLEM DIAGNOSES  Problem: Acute respiratory failure  Assessment and Plan: - prolonged intubation, now extubated  - BIPAP HS and PRN  - weaned from high flow to nasal cannula, tolerating  - cont sildenafil  - cont steroids  - chest PT    Problem: Enterococcal bacteremia  Assessment and Plan: - had 27 days of Vanc, 3 days of Ceftriaxone, and 8 days of Zosyn for enterococcal bacteremia  - cultures cleared    Problem: ESBL (extended spectrum beta-lactamase) producing bacteria infection  Assessment and Plan: - ESBL in urine  - completed ertapenem on 2/27  - passed TOV    Problem: Type 2 diabetes mellitus  Assessment and Plan: - a1c 7.3  - cont basal / bolus insulin  - endo note appreciated    Problem: SBO (small bowel obstruction)  Assessment and Plan: - SBO s/p ex-lap with findings of healthy bowel c/b evisceration of bowel requiring placement of retention sutures  - sx evaluated wound - retention sutures removed   - cont dressing to wound change 2x day    Problem: Rheumatoid arthritis  Assessment and Plan: - cont prednisone  - pain medications PRN    Problem: Encounter for palliative care  Assessment and Plan: - DNR/DNI at this time as pt does not want tracheostomy  - palliative and ethics note appreciated    Problem: Prophylactic measure  Assessment and Plan: - heparin.

## 2019-03-16 NOTE — PROGRESS NOTE ADULT - CARDIOVASCULAR DETAILS
tachycardia
positive S2/positive S1
positive S1/positive S2/tachycardia
tachycardia
tachycardia
positive S1/positive S2

## 2019-03-16 NOTE — PROGRESS NOTE ADULT - NS MD HP SKIN WOUND STATUS
dressing/clean/intact/dry
clean/intact/dry
dsd + sutures changed by surgery
intact/dry
edges healing/clean

## 2019-03-17 LAB
GLUCOSE BLDC GLUCOMTR-MCNC: 112 MG/DL — HIGH (ref 70–99)
GLUCOSE BLDC GLUCOMTR-MCNC: 113 MG/DL — HIGH (ref 70–99)
GLUCOSE BLDC GLUCOMTR-MCNC: 189 MG/DL — HIGH (ref 70–99)
GLUCOSE BLDC GLUCOMTR-MCNC: 221 MG/DL — HIGH (ref 70–99)

## 2019-03-17 PROCEDURE — 99223 1ST HOSP IP/OBS HIGH 75: CPT | Mod: GC

## 2019-03-17 RX ORDER — SODIUM CHLORIDE 0.65 %
2 AEROSOL, SPRAY (ML) NASAL
Qty: 0 | Refills: 0 | Status: DISCONTINUED | OUTPATIENT
Start: 2019-03-17 | End: 2019-03-20

## 2019-03-17 RX ADMIN — Medication 200 MILLIGRAM(S): at 17:19

## 2019-03-17 RX ADMIN — Medication 20 MILLIGRAM(S): at 05:24

## 2019-03-17 RX ADMIN — OXYCODONE HYDROCHLORIDE 5 MILLIGRAM(S): 5 TABLET ORAL at 10:39

## 2019-03-17 RX ADMIN — OXYCODONE HYDROCHLORIDE 5 MILLIGRAM(S): 5 TABLET ORAL at 22:09

## 2019-03-17 RX ADMIN — OXYCODONE HYDROCHLORIDE 5 MILLIGRAM(S): 5 TABLET ORAL at 04:17

## 2019-03-17 RX ADMIN — Medication 15 UNIT(S): at 08:59

## 2019-03-17 RX ADMIN — SIMVASTATIN 20 MILLIGRAM(S): 20 TABLET, FILM COATED ORAL at 22:14

## 2019-03-17 RX ADMIN — Medication 1 TABLET(S): at 17:19

## 2019-03-17 RX ADMIN — GABAPENTIN 300 MILLIGRAM(S): 400 CAPSULE ORAL at 13:04

## 2019-03-17 RX ADMIN — Medication 500 MILLIGRAM(S): at 12:13

## 2019-03-17 RX ADMIN — FENTANYL CITRATE 1 PATCH: 50 INJECTION INTRAVENOUS at 19:47

## 2019-03-17 RX ADMIN — TRAMADOL HYDROCHLORIDE 50 MILLIGRAM(S): 50 TABLET ORAL at 19:05

## 2019-03-17 RX ADMIN — OXYCODONE HYDROCHLORIDE 5 MILLIGRAM(S): 5 TABLET ORAL at 16:12

## 2019-03-17 RX ADMIN — Medication 40 MILLIGRAM(S): at 05:22

## 2019-03-17 RX ADMIN — Medication 11 UNIT(S): at 13:02

## 2019-03-17 RX ADMIN — Medication 4: at 13:02

## 2019-03-17 RX ADMIN — Medication 20 MILLIGRAM(S): at 13:04

## 2019-03-17 RX ADMIN — OXYCODONE HYDROCHLORIDE 5 MILLIGRAM(S): 5 TABLET ORAL at 00:14

## 2019-03-17 RX ADMIN — Medication 40 MILLIGRAM(S): at 17:18

## 2019-03-17 RX ADMIN — TRAMADOL HYDROCHLORIDE 50 MILLIGRAM(S): 50 TABLET ORAL at 18:23

## 2019-03-17 RX ADMIN — Medication 137 MICROGRAM(S): at 07:24

## 2019-03-17 RX ADMIN — Medication 1 SPRAY(S): at 00:10

## 2019-03-17 RX ADMIN — FENTANYL CITRATE 1 PATCH: 50 INJECTION INTRAVENOUS at 09:00

## 2019-03-17 RX ADMIN — OXYCODONE HYDROCHLORIDE 5 MILLIGRAM(S): 5 TABLET ORAL at 11:19

## 2019-03-17 RX ADMIN — TRAMADOL HYDROCHLORIDE 50 MILLIGRAM(S): 50 TABLET ORAL at 05:21

## 2019-03-17 RX ADMIN — Medication 500 MILLIGRAM(S): at 13:05

## 2019-03-17 RX ADMIN — Medication 1 TABLET(S): at 12:14

## 2019-03-17 RX ADMIN — HEPARIN SODIUM 7500 UNIT(S): 5000 INJECTION INTRAVENOUS; SUBCUTANEOUS at 22:12

## 2019-03-17 RX ADMIN — OXYCODONE HYDROCHLORIDE 5 MILLIGRAM(S): 5 TABLET ORAL at 23:03

## 2019-03-17 RX ADMIN — Medication 500 MILLIGRAM(S): at 00:21

## 2019-03-17 RX ADMIN — Medication 40 MILLIEQUIVALENT(S): at 12:13

## 2019-03-17 RX ADMIN — PANTOPRAZOLE SODIUM 40 MILLIGRAM(S): 20 TABLET, DELAYED RELEASE ORAL at 05:22

## 2019-03-17 RX ADMIN — Medication 11 UNIT(S): at 17:18

## 2019-03-17 RX ADMIN — Medication 1 TABLET(S): at 09:00

## 2019-03-17 RX ADMIN — OXYCODONE HYDROCHLORIDE 5 MILLIGRAM(S): 5 TABLET ORAL at 10:44

## 2019-03-17 RX ADMIN — CHLORHEXIDINE GLUCONATE 1 APPLICATION(S): 213 SOLUTION TOPICAL at 09:13

## 2019-03-17 RX ADMIN — GABAPENTIN 300 MILLIGRAM(S): 400 CAPSULE ORAL at 22:14

## 2019-03-17 RX ADMIN — INSULIN GLARGINE 28 UNIT(S): 100 INJECTION, SOLUTION SUBCUTANEOUS at 22:11

## 2019-03-17 RX ADMIN — Medication 10 MILLIGRAM(S): at 05:25

## 2019-03-17 RX ADMIN — Medication 20 MILLIGRAM(S): at 22:14

## 2019-03-17 RX ADMIN — GABAPENTIN 300 MILLIGRAM(S): 400 CAPSULE ORAL at 05:22

## 2019-03-17 RX ADMIN — Medication 2 SPRAY(S): at 17:20

## 2019-03-17 RX ADMIN — OXYCODONE HYDROCHLORIDE 5 MILLIGRAM(S): 5 TABLET ORAL at 16:48

## 2019-03-17 RX ADMIN — Medication 2: at 08:59

## 2019-03-17 RX ADMIN — Medication 1 APPLICATION(S): at 12:13

## 2019-03-17 NOTE — PROGRESS NOTE ADULT - ASSESSMENT
63 year old female with PMHx of HTN, DM, hypothyroidism, RA (prednisone 10mg), pulmonary HTN, CHRIS, and COPD (3L at home) who was originally admitted on 12/15 for SBO s/p ex lap with lysis of adhesions (4 retention sutures) c/b 12/30 she eviscerated 2/2 a coughing episode s/p ex lap (6 retention sutures) c/b multifocal pneumonia, developed hypercapnic respiratory failure requiring intubation 1/6, s/p bronch 1/9/18, and extubated on 1/24, course further c/b Enterococci bacteremia now readmitted to MICU for hypoxic respiratory failure 2/2 atelectasis requiring intubation 01/31. Had grown ESBL in urine, s/p ertapenem.  Extubated to high flow/ BIPAP.    Problem Selector:  PROBLEM DIAGNOSES  Problem: Acute respiratory failure  Assessment and Plan: - prolonged intubation, now extubated  - BIPAP HS and PRN  - weaned from high flow to nasal cannula, tolerating  - cont sildenafil  - cont steroids  - chest PT    Problem: Enterococcal bacteremia  Assessment and Plan: - had 27 days of Vanc, 3 days of Ceftriaxone, and 8 days of Zosyn for enterococcal bacteremia  - cultures cleared    Problem: ESBL (extended spectrum beta-lactamase) producing bacteria infection  Assessment and Plan: - ESBL in urine  - completed ertapenem on 2/27  - passed TOV    Problem: Type 2 diabetes mellitus  Assessment and Plan: - a1c 7.3  - cont basal / bolus insulin  - endo note appreciated    Problem: SBO (small bowel obstruction)  Assessment and Plan: - SBO s/p ex-lap with findings of healthy bowel c/b evisceration of bowel requiring placement of retention sutures  - sx evaluated wound - retention sutures removed   - cont dressing to wound change 2x day    Problem: Rheumatoid arthritis  Assessment and Plan: - cont prednisone  - pain medications PRN    Problem: Encounter for palliative care  Assessment and Plan: - DNR/DNI at this time as pt does not want tracheostomy  - palliative and ethics note appreciated    Problem: Prophylactic measure  Assessment and Plan: - heparin.

## 2019-03-17 NOTE — PROGRESS NOTE ADULT - SUBJECTIVE AND OBJECTIVE BOX
CHIEF COMPLAINT:    Interval Events:    REVIEW OF SYSTEMS:  Constitutional:   Eyes:  ENT:  CV:  Resp:  GI:  :  MSK:  Integumentary:  Neurological:  Psychiatric:  Endocrine:  Hematologic/Lymphatic:  Allergic/Immunologic:  [ ] All other systems negative  [ ] Unable to assess ROS because ________    OBJECTIVE:  ICU Vital Signs Last 24 Hrs  T(C): 36.9 (17 Mar 2019 04:15), Max: 37.2 (16 Mar 2019 14:01)  T(F): 98.5 (17 Mar 2019 04:15), Max: 98.9 (16 Mar 2019 14:01)  HR: 88 (17 Mar 2019 07:45) (81 - 103)  BP: 128/75 (17 Mar 2019 04:15) (123/80 - 149/88)  BP(mean): --  ABP: --  ABP(mean): --  RR: 16 (17 Mar 2019 04:15) (16 - 18)  SpO2: 100% (17 Mar 2019 07:45) (95% - 100%)        CAPILLARY BLOOD GLUCOSE      POCT Blood Glucose.: 189 mg/dL (17 Mar 2019 08:25)      PHYSICAL EXAM:  General:   HEENT:   Lymph Nodes:  Neck:   Respiratory:   Cardiovascular:   Abdomen:   Extremities:   Skin:   Neurological:  Psychiatry:    HOSPITAL MEDICATIONS:  MEDICATIONS  (STANDING):  ascorbic acid Syrup 1000 milliGRAM(s) Oral once  chlorhexidine 4% Liquid 1 Application(s) Topical <User Schedule>  collagenase Ointment 1 Application(s) Topical daily  dextrose 5%. 1000 milliLiter(s) (50 mL/Hr) IV Continuous <Continuous>  dextrose 50% Injectable 12.5 Gram(s) IV Push once  dextrose 50% Injectable 25 Gram(s) IV Push once  dextrose 50% Injectable 25 Gram(s) IV Push once  fentaNYL   Patch  25 MICROgram(s)/Hr 1 Patch Transdermal every 72 hours  furosemide    Tablet 40 milliGRAM(s) Oral two times a day  gabapentin 300 milliGRAM(s) Oral three times a day  heparin  Injectable 7500 Unit(s) SubCutaneous every 8 hours  insulin glargine Injectable (LANTUS) 28 Unit(s) SubCutaneous at bedtime  insulin lispro (HumaLOG) corrective regimen sliding scale   SubCutaneous three times a day before meals  insulin lispro (HumaLOG) corrective regimen sliding scale   SubCutaneous at bedtime  insulin lispro Injectable (HumaLOG) 11 Unit(s) SubCutaneous <User Schedule>  insulin lispro Injectable (HumaLOG) 15 Unit(s) SubCutaneous with breakfast  lactobacillus acidophilus 1 Tablet(s) Oral three times a day with meals  levothyroxine 137 MICROGram(s) Oral <User Schedule>  naproxen 500 milliGRAM(s) Oral two times a day  pantoprazole    Tablet 40 milliGRAM(s) Oral before breakfast  potassium chloride   Powder 40 milliEquivalent(s) Oral daily  predniSONE   Tablet 10 milliGRAM(s) Oral daily  psyllium Powder 1 Packet(s) Oral daily  sildenafil (REVATIO) 20 milliGRAM(s) Oral three times a day  simvastatin 20 milliGRAM(s) Oral at bedtime  traMADol 50 milliGRAM(s) Oral two times a day    MEDICATIONS  (PRN):  acetaminophen   Tablet .. 650 milliGRAM(s) Oral every 6 hours PRN Temp greater or equal to 38C (100.4F), Moderate Pain (4 - 6)  dextrose 40% Gel 15 Gram(s) Oral once PRN Blood Glucose LESS THAN 70 milliGRAM(s)/deciLiter  glucagon  Injectable 1 milliGRAM(s) IntraMuscular once PRN Glucose <70 milliGRAM(s)/deciLiter  oxyCODONE    IR 5 milliGRAM(s) Oral every 4 hours PRN moderate and severe pain  sodium chloride 0.65% Nasal 1 Spray(s) Both Nostrils four times a day PRN Nasal Congestion      LABS:                    MICROBIOLOGY:     RADIOLOGY:  [ ] Reviewed and interpreted by me    PULMONARY FUNCTION TESTS:    EKG: CHIEF COMPLAINT: Patient is a 63y old  Female who presents with a chief complaint of SBO (15 Mar 2019 12:20)      Interval Events: none     REVIEW OF SYSTEMS:  Constitutional:   Eyes:  ENT:  CV:  Resp:  GI:  :  MSK:  Integumentary:  Neurological:  Psychiatric:  Endocrine:  Hematologic/Lymphatic:  Allergic/Immunologic:  [ ] All other systems negative  [ ] Unable to assess ROS because ________    OBJECTIVE:  ICU Vital Signs Last 24 Hrs  T(C): 36.9 (17 Mar 2019 04:15), Max: 37.2 (16 Mar 2019 14:01)  T(F): 98.5 (17 Mar 2019 04:15), Max: 98.9 (16 Mar 2019 14:01)  HR: 88 (17 Mar 2019 07:45) (81 - 103)  BP: 128/75 (17 Mar 2019 04:15) (123/80 - 149/88)  BP(mean): --  ABP: --  ABP(mean): --  RR: 16 (17 Mar 2019 04:15) (16 - 18)  SpO2: 100% (17 Mar 2019 07:45) (95% - 100%)        CAPILLARY BLOOD GLUCOSE      POCT Blood Glucose.: 189 mg/dL (17 Mar 2019 08:25)      PHYSICAL EXAM:  General:   HEENT:   Lymph Nodes:  Neck:   Respiratory:   Cardiovascular:   Abdomen:   Extremities:   Skin:   Neurological:  Psychiatry:    HOSPITAL MEDICATIONS:  MEDICATIONS  (STANDING):  ascorbic acid Syrup 1000 milliGRAM(s) Oral once  chlorhexidine 4% Liquid 1 Application(s) Topical <User Schedule>  collagenase Ointment 1 Application(s) Topical daily  dextrose 5%. 1000 milliLiter(s) (50 mL/Hr) IV Continuous <Continuous>  dextrose 50% Injectable 12.5 Gram(s) IV Push once  dextrose 50% Injectable 25 Gram(s) IV Push once  dextrose 50% Injectable 25 Gram(s) IV Push once  fentaNYL   Patch  25 MICROgram(s)/Hr 1 Patch Transdermal every 72 hours  furosemide    Tablet 40 milliGRAM(s) Oral two times a day  gabapentin 300 milliGRAM(s) Oral three times a day  heparin  Injectable 7500 Unit(s) SubCutaneous every 8 hours  insulin glargine Injectable (LANTUS) 28 Unit(s) SubCutaneous at bedtime  insulin lispro (HumaLOG) corrective regimen sliding scale   SubCutaneous three times a day before meals  insulin lispro (HumaLOG) corrective regimen sliding scale   SubCutaneous at bedtime  insulin lispro Injectable (HumaLOG) 11 Unit(s) SubCutaneous <User Schedule>  insulin lispro Injectable (HumaLOG) 15 Unit(s) SubCutaneous with breakfast  lactobacillus acidophilus 1 Tablet(s) Oral three times a day with meals  levothyroxine 137 MICROGram(s) Oral <User Schedule>  naproxen 500 milliGRAM(s) Oral two times a day  pantoprazole    Tablet 40 milliGRAM(s) Oral before breakfast  potassium chloride   Powder 40 milliEquivalent(s) Oral daily  predniSONE   Tablet 10 milliGRAM(s) Oral daily  psyllium Powder 1 Packet(s) Oral daily  sildenafil (REVATIO) 20 milliGRAM(s) Oral three times a day  simvastatin 20 milliGRAM(s) Oral at bedtime  traMADol 50 milliGRAM(s) Oral two times a day    MEDICATIONS  (PRN):  acetaminophen   Tablet .. 650 milliGRAM(s) Oral every 6 hours PRN Temp greater or equal to 38C (100.4F), Moderate Pain (4 - 6)  dextrose 40% Gel 15 Gram(s) Oral once PRN Blood Glucose LESS THAN 70 milliGRAM(s)/deciLiter  glucagon  Injectable 1 milliGRAM(s) IntraMuscular once PRN Glucose <70 milliGRAM(s)/deciLiter  oxyCODONE    IR 5 milliGRAM(s) Oral every 4 hours PRN moderate and severe pain  sodium chloride 0.65% Nasal 1 Spray(s) Both Nostrils four times a day PRN Nasal Congestion      LABS:                    MICROBIOLOGY:     RADIOLOGY:  [ ] Reviewed and interpreted by me    PULMONARY FUNCTION TESTS:    EKG: CHIEF COMPLAINT: Patient is a 63y old  Female who presents with a chief complaint of SBO (15 Mar 2019 12:20)      Interval Events: none     REVIEW OF SYSTEMS:  Constitutional: No fever or chills   ENT: + green discharge from nose , no sinus tenderness   CV: Denies   Resp: Improved   GI: Denies   [x ] All other systems negative  [ ] Unable to assess ROS because ________    OBJECTIVE:  ICU Vital Signs Last 24 Hrs  T(C): 36.9 (17 Mar 2019 04:15), Max: 37.2 (16 Mar 2019 14:01)  T(F): 98.5 (17 Mar 2019 04:15), Max: 98.9 (16 Mar 2019 14:01)  HR: 88 (17 Mar 2019 07:45) (81 - 103)  BP: 128/75 (17 Mar 2019 04:15) (123/80 - 149/88)  BP(mean): --  ABP: --  ABP(mean): --  RR: 16 (17 Mar 2019 04:15) (16 - 18)  SpO2: 100% (17 Mar 2019 07:45) (95% - 100%)        CAPILLARY BLOOD GLUCOSE      POCT Blood Glucose.: 189 mg/dL (17 Mar 2019 08:25)        HOSPITAL MEDICATIONS:  MEDICATIONS  (STANDING):  ascorbic acid Syrup 1000 milliGRAM(s) Oral once  chlorhexidine 4% Liquid 1 Application(s) Topical <User Schedule>  collagenase Ointment 1 Application(s) Topical daily  dextrose 5%. 1000 milliLiter(s) (50 mL/Hr) IV Continuous <Continuous>  dextrose 50% Injectable 12.5 Gram(s) IV Push once  dextrose 50% Injectable 25 Gram(s) IV Push once  dextrose 50% Injectable 25 Gram(s) IV Push once  fentaNYL   Patch  25 MICROgram(s)/Hr 1 Patch Transdermal every 72 hours  furosemide    Tablet 40 milliGRAM(s) Oral two times a day  gabapentin 300 milliGRAM(s) Oral three times a day  heparin  Injectable 7500 Unit(s) SubCutaneous every 8 hours  insulin glargine Injectable (LANTUS) 28 Unit(s) SubCutaneous at bedtime  insulin lispro (HumaLOG) corrective regimen sliding scale   SubCutaneous three times a day before meals  insulin lispro (HumaLOG) corrective regimen sliding scale   SubCutaneous at bedtime  insulin lispro Injectable (HumaLOG) 11 Unit(s) SubCutaneous <User Schedule>  insulin lispro Injectable (HumaLOG) 15 Unit(s) SubCutaneous with breakfast  lactobacillus acidophilus 1 Tablet(s) Oral three times a day with meals  levothyroxine 137 MICROGram(s) Oral <User Schedule>  naproxen 500 milliGRAM(s) Oral two times a day  pantoprazole    Tablet 40 milliGRAM(s) Oral before breakfast  potassium chloride   Powder 40 milliEquivalent(s) Oral daily  predniSONE   Tablet 10 milliGRAM(s) Oral daily  psyllium Powder 1 Packet(s) Oral daily  sildenafil (REVATIO) 20 milliGRAM(s) Oral three times a day  simvastatin 20 milliGRAM(s) Oral at bedtime  traMADol 50 milliGRAM(s) Oral two times a day    MEDICATIONS  (PRN):  acetaminophen   Tablet .. 650 milliGRAM(s) Oral every 6 hours PRN Temp greater or equal to 38C (100.4F), Moderate Pain (4 - 6)  dextrose 40% Gel 15 Gram(s) Oral once PRN Blood Glucose LESS THAN 70 milliGRAM(s)/deciLiter  glucagon  Injectable 1 milliGRAM(s) IntraMuscular once PRN Glucose <70 milliGRAM(s)/deciLiter  oxyCODONE    IR 5 milliGRAM(s) Oral every 4 hours PRN moderate and severe pain  sodium chloride 0.65% Nasal 1 Spray(s) Both Nostrils four times a day PRN Nasal Congestion      LABS:                    MICROBIOLOGY:     RADIOLOGY:  [ ] Reviewed and interpreted by me    PULMONARY FUNCTION TESTS:    EKG:

## 2019-03-18 DIAGNOSIS — R25.1 TREMOR, UNSPECIFIED: ICD-10-CM

## 2019-03-18 LAB
GLUCOSE BLDC GLUCOMTR-MCNC: 116 MG/DL — HIGH (ref 70–99)
GLUCOSE BLDC GLUCOMTR-MCNC: 172 MG/DL — HIGH (ref 70–99)
GLUCOSE BLDC GLUCOMTR-MCNC: 191 MG/DL — HIGH (ref 70–99)
GLUCOSE BLDC GLUCOMTR-MCNC: 87 MG/DL — SIGNIFICANT CHANGE UP (ref 70–99)

## 2019-03-18 PROCEDURE — 99221 1ST HOSP IP/OBS SF/LOW 40: CPT | Mod: GC

## 2019-03-18 PROCEDURE — 99233 SBSQ HOSP IP/OBS HIGH 50: CPT | Mod: GC

## 2019-03-18 RX ADMIN — Medication 40 MILLIGRAM(S): at 17:37

## 2019-03-18 RX ADMIN — Medication 40 MILLIEQUIVALENT(S): at 12:26

## 2019-03-18 RX ADMIN — GABAPENTIN 300 MILLIGRAM(S): 400 CAPSULE ORAL at 06:45

## 2019-03-18 RX ADMIN — HEPARIN SODIUM 7500 UNIT(S): 5000 INJECTION INTRAVENOUS; SUBCUTANEOUS at 21:19

## 2019-03-18 RX ADMIN — Medication 11 UNIT(S): at 17:35

## 2019-03-18 RX ADMIN — FENTANYL CITRATE 1 PATCH: 50 INJECTION INTRAVENOUS at 12:42

## 2019-03-18 RX ADMIN — OXYCODONE HYDROCHLORIDE 5 MILLIGRAM(S): 5 TABLET ORAL at 14:14

## 2019-03-18 RX ADMIN — PANTOPRAZOLE SODIUM 40 MILLIGRAM(S): 20 TABLET, DELAYED RELEASE ORAL at 06:45

## 2019-03-18 RX ADMIN — GABAPENTIN 300 MILLIGRAM(S): 400 CAPSULE ORAL at 21:16

## 2019-03-18 RX ADMIN — CHLORHEXIDINE GLUCONATE 1 APPLICATION(S): 213 SOLUTION TOPICAL at 09:32

## 2019-03-18 RX ADMIN — Medication 2 SPRAY(S): at 17:37

## 2019-03-18 RX ADMIN — GABAPENTIN 300 MILLIGRAM(S): 400 CAPSULE ORAL at 14:14

## 2019-03-18 RX ADMIN — SIMVASTATIN 20 MILLIGRAM(S): 20 TABLET, FILM COATED ORAL at 21:16

## 2019-03-18 RX ADMIN — Medication 2 SPRAY(S): at 12:30

## 2019-03-18 RX ADMIN — Medication 500 MILLIGRAM(S): at 13:04

## 2019-03-18 RX ADMIN — Medication 200 MILLIGRAM(S): at 23:25

## 2019-03-18 RX ADMIN — OXYCODONE HYDROCHLORIDE 5 MILLIGRAM(S): 5 TABLET ORAL at 21:13

## 2019-03-18 RX ADMIN — Medication 11 UNIT(S): at 12:25

## 2019-03-18 RX ADMIN — Medication 1 TABLET(S): at 19:12

## 2019-03-18 RX ADMIN — Medication 40 MILLIGRAM(S): at 06:37

## 2019-03-18 RX ADMIN — OXYCODONE HYDROCHLORIDE 5 MILLIGRAM(S): 5 TABLET ORAL at 08:15

## 2019-03-18 RX ADMIN — FENTANYL CITRATE 1 PATCH: 50 INJECTION INTRAVENOUS at 19:12

## 2019-03-18 RX ADMIN — Medication 10 MILLIGRAM(S): at 06:36

## 2019-03-18 RX ADMIN — Medication 1 APPLICATION(S): at 12:30

## 2019-03-18 RX ADMIN — Medication 1 TABLET(S): at 12:29

## 2019-03-18 RX ADMIN — Medication 1 TABLET(S): at 08:15

## 2019-03-18 RX ADMIN — Medication 2: at 17:35

## 2019-03-18 RX ADMIN — FENTANYL CITRATE 1 PATCH: 50 INJECTION INTRAVENOUS at 12:30

## 2019-03-18 RX ADMIN — Medication 200 MILLIGRAM(S): at 17:37

## 2019-03-18 RX ADMIN — Medication 2: at 12:25

## 2019-03-18 RX ADMIN — Medication 2 SPRAY(S): at 00:00

## 2019-03-18 RX ADMIN — OXYCODONE HYDROCHLORIDE 5 MILLIGRAM(S): 5 TABLET ORAL at 08:45

## 2019-03-18 RX ADMIN — Medication 500 MILLIGRAM(S): at 01:00

## 2019-03-18 RX ADMIN — FENTANYL CITRATE 1 PATCH: 50 INJECTION INTRAVENOUS at 06:47

## 2019-03-18 RX ADMIN — OXYCODONE HYDROCHLORIDE 5 MILLIGRAM(S): 5 TABLET ORAL at 20:32

## 2019-03-18 RX ADMIN — TRAMADOL HYDROCHLORIDE 50 MILLIGRAM(S): 50 TABLET ORAL at 06:41

## 2019-03-18 RX ADMIN — TRAMADOL HYDROCHLORIDE 50 MILLIGRAM(S): 50 TABLET ORAL at 18:05

## 2019-03-18 RX ADMIN — Medication 15 UNIT(S): at 08:14

## 2019-03-18 RX ADMIN — Medication 500 MILLIGRAM(S): at 21:19

## 2019-03-18 RX ADMIN — Medication 500 MILLIGRAM(S): at 12:29

## 2019-03-18 RX ADMIN — INSULIN GLARGINE 28 UNIT(S): 100 INJECTION, SOLUTION SUBCUTANEOUS at 21:15

## 2019-03-18 RX ADMIN — Medication 137 MICROGRAM(S): at 06:37

## 2019-03-18 RX ADMIN — HEPARIN SODIUM 7500 UNIT(S): 5000 INJECTION INTRAVENOUS; SUBCUTANEOUS at 06:36

## 2019-03-18 RX ADMIN — Medication 2 SPRAY(S): at 06:37

## 2019-03-18 RX ADMIN — Medication 1 SPRAY(S): at 09:31

## 2019-03-18 RX ADMIN — TRAMADOL HYDROCHLORIDE 50 MILLIGRAM(S): 50 TABLET ORAL at 17:36

## 2019-03-18 RX ADMIN — Medication 500 MILLIGRAM(S): at 21:13

## 2019-03-18 RX ADMIN — TRAMADOL HYDROCHLORIDE 50 MILLIGRAM(S): 50 TABLET ORAL at 07:14

## 2019-03-18 RX ADMIN — Medication 20 MILLIGRAM(S): at 06:36

## 2019-03-18 RX ADMIN — OXYCODONE HYDROCHLORIDE 5 MILLIGRAM(S): 5 TABLET ORAL at 14:45

## 2019-03-18 RX ADMIN — Medication 20 MILLIGRAM(S): at 21:16

## 2019-03-18 RX ADMIN — Medication 500 MILLIGRAM(S): at 00:01

## 2019-03-18 RX ADMIN — Medication 1 TABLET(S): at 17:36

## 2019-03-18 RX ADMIN — Medication 20 MILLIGRAM(S): at 14:16

## 2019-03-18 NOTE — PROGRESS NOTE ADULT - NSICDXPROBLEM_GEN_ALL_CORE_FT
PROBLEM DIAGNOSES  Problem: Acute respiratory failure  Assessment and Plan: Highflow titrated down to nasal cannula today and tolerating   Bipap at hs   Continue sildenafil   Chest PT   OOB    Lasix bid     Problem: Tremors of nervous system  Assessment and Plan: Pt with LT tremors at rest increased with movement   Noted to be increased today with difficulty feeding self and having flailing tremors   Neuro consult done and pt can fu as OP for further eval and OP MRI     Problem: Bacteremia  Assessment and Plan: Clutures cleared   off abx   augmentin for suspected sinus infection started     Problem: Fever  Assessment and Plan: Pt had fever 3/6 cx sent no GTF  no further fevers   no abx     Problem: Type 2 diabetes mellitus  Assessment and Plan: cont basil /premeal insulin   Endo recs noted       Problem: Rheumatoid arthritis  Assessment and Plan: Continues on home dose of prednisone 10mg daiy   PT for mobility     Problem: Conflicted attitude towards treatment  Assessment and Plan: Pt requesting to be DNR does not want vent or trach   Spoke to [place] pt health care proxy who validates pts wishes   DNR on chart     Problem: SBO (small bowel obstruction)  Assessment and Plan: Abd dressing 2x daily - retention sutures out   fentanyl patch   naproxen /gabapentin   surgical fu prn     Problem: Respiratory failure  Assessment and Plan:     Problem: Prophylactic measure  Assessment and Plan: heparin subq     Problem: CHRIS (obstructive sleep apnea)  Assessment and Plan: BIpap hs     Problem: Adult Hypothyroidism  Assessment and Plan: Synthroid daily   Follow TFT's on discharge PROBLEM DIAGNOSES  Problem: Acute respiratory failure  Assessment and Plan: Highflow titrated down to nasal cannula today and tolerating   Bipap at hs   Continue sildenafil   Chest PT   OOB    Lasix bid     Problem: Tremors of nervous system  Assessment and Plan: Pt with LT tremors at rest increased with movement   Noted to be increased today with difficulty feeding self and having flailing tremors   Neuro consult done and pt can fu as OP for further eval and OP MRI     Problem: Bacteremia  Assessment and Plan: Clutures cleared   augmentin for suspected sinus infection started     Problem: Fever  Assessment and Plan: Pt had fever 3/6 cx sent no GTF  no further fevers   no abx     Problem: Type 2 diabetes mellitus  Assessment and Plan: cont basil /premeal insulin   Endo recs noted       Problem: Rheumatoid arthritis  Assessment and Plan: Continues on home dose of prednisone 10mg daiy   PT for mobility     Problem: Conflicted attitude towards treatment  Assessment and Plan: Pt requesting to be DNR does not want vent or trach   Spoke to [place] pt health care proxy who validates pts wishes   DNR on chart     Problem: SBO (small bowel obstruction)  Assessment and Plan: Abd dressing 2x daily - retention sutures out   fentanyl patch   naproxen /gabapentin   surgical fu prn     Problem: Respiratory failure  Assessment and Plan:     Problem: Prophylactic measure  Assessment and Plan: heparin subq     Problem: CHRIS (obstructive sleep apnea)  Assessment and Plan: BIpap hs     Problem: Adult Hypothyroidism  Assessment and Plan: Synthroid daily   Follow TFT's on discharge

## 2019-03-18 NOTE — CONSULT NOTE ADULT - SUBJECTIVE AND OBJECTIVE BOX
63 year old F, PMH COPD on home O2 (3L), moderate pulmonary HTN, DM, RA on prednisone (10 mg daily x 30 years), HTN, s/p CVA (no residual deficit), admitted for SBO and exlap, with hospitalization complicated by respiratory failure requiring intubation, now extubated. Neurology is consulted for tremor. Patient reports tremor for approximately 2 years in both hands at rest and upon reaching, but feels that it has become progressively worse for past couple months, and particularly in past week. She reports that she has had trouble eating and using her phone due to the tremor in this past week. She states that when the arm is rested on a pillow, the tremor is better. She also reports pain in her hands that is likely 2/2 RA. Patient states that she has never been worked up for the tremor previously. Her brother and sister have tremors as well, also without workup. Patient has not been able to walk 2/2 weakness/deconditioning, but denies shuffling gait prior to admission, though she used a walker. Patient reports that she has had some memory concerns only since she was extubated in the hospital, with no prior cognitive concerns.       MEDICATIONS  (STANDING):  amoxicillin  875 milliGRAM(s)/clavulanate 1 Tablet(s) Oral two times a day  ascorbic acid Syrup 1000 milliGRAM(s) Oral once  chlorhexidine 4% Liquid 1 Application(s) Topical <User Schedule>  collagenase Ointment 1 Application(s) Topical daily  dextrose 5%. 1000 milliLiter(s) (50 mL/Hr) IV Continuous <Continuous>  dextrose 50% Injectable 12.5 Gram(s) IV Push once  dextrose 50% Injectable 25 Gram(s) IV Push once  dextrose 50% Injectable 25 Gram(s) IV Push once  fentaNYL   Patch  25 MICROgram(s)/Hr 1 Patch Transdermal every 72 hours  furosemide    Tablet 40 milliGRAM(s) Oral two times a day  gabapentin 300 milliGRAM(s) Oral three times a day  guaiFENesin    Syrup 200 milliGRAM(s) Oral every 6 hours  heparin  Injectable 7500 Unit(s) SubCutaneous every 8 hours  insulin glargine Injectable (LANTUS) 28 Unit(s) SubCutaneous at bedtime  insulin lispro (HumaLOG) corrective regimen sliding scale   SubCutaneous three times a day before meals  insulin lispro (HumaLOG) corrective regimen sliding scale   SubCutaneous at bedtime  insulin lispro Injectable (HumaLOG) 11 Unit(s) SubCutaneous <User Schedule>  insulin lispro Injectable (HumaLOG) 15 Unit(s) SubCutaneous with breakfast  lactobacillus acidophilus 1 Tablet(s) Oral three times a day with meals  levothyroxine 137 MICROGram(s) Oral <User Schedule>  naproxen 500 milliGRAM(s) Oral two times a day  pantoprazole    Tablet 40 milliGRAM(s) Oral before breakfast  potassium chloride   Powder 40 milliEquivalent(s) Oral daily  predniSONE   Tablet 10 milliGRAM(s) Oral daily  psyllium Powder 1 Packet(s) Oral daily  sildenafil (REVATIO) 20 milliGRAM(s) Oral three times a day  simvastatin 20 milliGRAM(s) Oral at bedtime  sodium chloride 0.65% Nasal 2 Spray(s) Both Nostrils four times a day  traMADol 50 milliGRAM(s) Oral two times a day    MEDICATIONS  (PRN):  acetaminophen   Tablet .. 650 milliGRAM(s) Oral every 6 hours PRN Temp greater or equal to 38C (100.4F), Moderate Pain (4 - 6)  dextrose 40% Gel 15 Gram(s) Oral once PRN Blood Glucose LESS THAN 70 milliGRAM(s)/deciLiter  glucagon  Injectable 1 milliGRAM(s) IntraMuscular once PRN Glucose <70 milliGRAM(s)/deciLiter  oxyCODONE    IR 5 milliGRAM(s) Oral every 4 hours PRN moderate and severe pain  sodium chloride 0.65% Nasal 1 Spray(s) Both Nostrils four times a day PRN Nasal Congestion      PAST MEDICAL & SURGICAL HISTORY:  Diverticulosis  CVA (Cerebral Vascular Accident)  RA (Rheumatoid Arthritis)  Tooth Abscess  Arthritis  Cigarette Smoker  Chronic Asthma  Adult Hypothyroidism  Borderline Diabetes Mellitus  High Cholesterol  H/O: HTN  Wrist Disorder: S/P Surgery  History of  Section      FAMILY HISTORY:  No pertinent family history in first degree relatives      Allergies    No Known Allergies    Intolerances    Seroquel (Other)        SHx - No smoking, No ETOH, No drug abuse      Review of Systems:  CONSTITUTIONAL:  No weight loss, fever, chills, weakness or fatigue.  HEENT:  Eyes:  No visual loss, blurred vision, double vision or yellow sclerae. Ears, Nose, Throat:  No hearing loss, sneezing, + congestion, runny nose  SKIN:  No rash or itching.  CARDIOVASCULAR:  No chest pain, chest pressure or chest discomfort. No palpitations or edema.  RESPIRATORY:  baseline SOB requiring NC and bipap  GASTROINTESTINAL:  No anorexia, nausea, vomiting or diarrhea. No abdominal pain or blood.  GENITOURINARY:  NO Burning on urination.   NEUROLOGICAL: See HPI  MUSCULOSKELETAL:  Pain/locked L shoulder, bilateral hand deformity 2/2 RA.   HEMATOLOGIC:  No anemia, bleeding or bruising.  LYMPHATICS:  No enlarged nodes. No history of splenectomy.  PSYCHIATRIC:  No history of depression or anxiety.  ENDOCRINOLOGIC:  No reports of sweating, cold or heat intolerance. No polyuria or polydipsia.  ALLERGIES:  No history of asthma, hives, eczema or rhinitis.      Vital Signs Last 24 Hrs  T(C): 36.9 (18 Mar 2019 14:00), Max: 36.9 (18 Mar 2019 14:00)  T(F): 98.5 (18 Mar 2019 14:00), Max: 98.5 (18 Mar 2019 14:00)  HR: 106 (18 Mar 2019 14:00) (87 - 106)  BP: 127/79 (18 Mar 2019 14:00) (127/79 - 143/70)  RR: 19 (18 Mar 2019 14:00) (18 - 19)  SpO2: 98% (18 Mar 2019 14:00) (95% - 100%)    General Exam:   General appearance: No acute distress                   Neurological Exam:    Mental Status: Orientated to self, date and place.  Attention intact.  No dysarthria, aphasia or neglect.  Cranial Nerves: PERRL, EOMI, CN V1-3 intact to light touch and pinprick.  No facial asymmetry, Tongue, uvula and palate midline.    Motor:   Tone: Possible cogwheeling in L arm, though exam limited by shoulder freeze and severe tremor.                 Strength: strength 4+/5 in all extremities. No drift.   Dysmetria: None to finger-nose-finger  No truncal ataxia.    Tremor: Resting tremor in bilateral hands and R foot. c/w pill rolling tremor, but cannot confirm 2/2 RA deformity. Of note, tremor decreased in amplitude when patient distracted.   Sensation: intact to light touch, sensation diminished in RLE, which she states has been consistent since MICU.   Toes flexor bilaterally  Gait: unable to walk

## 2019-03-18 NOTE — PROGRESS NOTE ADULT - ATTENDING COMMENTS
Stable clinically except for worsening of UE tremors.  Appreciate neurology evaluation.  Can follow up as outpatient to complete work up.  Remains stable for discharge.

## 2019-03-18 NOTE — CONSULT NOTE ADULT - ATTENDING COMMENTS
Patient seen and examined and above note reviewed and I agree with assessment and plan as outlined. Patient with bilateral arm tremor for past 6 months and occurs at rest and with movements but no rigidity or other parkinson features.   She is also on other medications that can induce tremor such as tramadol, robitussin and her underlying thyroid condition.   It may also be due to her general medical conditions or to enhanced physiologic tremor.    Check TSH, Free T4, b 12, RPR and folate and copper and ammonia levels  Continue medical mgt and supportive care  Will follow up but no medication to be added at this time. Patient seen and examined and above note reviewed and I agree with assessment and plan as outlined. Patient with bilateral arm tremor for past 6 months and occurs at rest and with movements but no rigidity or other parkinson features.   She is also on other medications that can induce tremor such as tramadol, robitussin, and prednisone and her underlying thyroid condition.   It may also be due to her general medical conditions or to enhanced physiologic tremor.    Check TSH, Free T4, b 12, RPR and folate and copper and ammonia levels  Continue medical mgt and supportive care  Will follow up but no medication to be added at this time.

## 2019-03-18 NOTE — PROGRESS NOTE ADULT - SUBJECTIVE AND OBJECTIVE BOX
CHIEF COMPLAINT:    Interval Events:    REVIEW OF SYSTEMS:  Constitutional:   Eyes:  ENT:  CV:  Resp:  GI:  :  MSK:  Integumentary:  Neurological:  Psychiatric:  Endocrine:  Hematologic/Lymphatic:  Allergic/Immunologic:  [ ] All other systems negative  [ ] Unable to assess ROS because ________    OBJECTIVE:  ICU Vital Signs Last 24 Hrs  T(C): 36.7 (18 Mar 2019 06:46), Max: 37.1 (17 Mar 2019 15:00)  T(F): 98.1 (18 Mar 2019 06:46), Max: 98.7 (17 Mar 2019 15:00)  HR: 92 (18 Mar 2019 07:15) (87 - 101)  BP: 143/70 (18 Mar 2019 06:46) (114/76 - 143/70)  BP(mean): --  ABP: --  ABP(mean): --  RR: 18 (18 Mar 2019 06:46) (18 - 18)  SpO2: 100% (18 Mar 2019 07:15) (95% - 100%)        CAPILLARY BLOOD GLUCOSE      POCT Blood Glucose.: 116 mg/dL (18 Mar 2019 07:57)      PHYSICAL EXAM:  General:   HEENT:   Lymph Nodes:  Neck:   Respiratory:   Cardiovascular:   Abdomen:   Extremities:   Skin:   Neurological:  Psychiatry:    HOSPITAL MEDICATIONS:  MEDICATIONS  (STANDING):  ascorbic acid Syrup 1000 milliGRAM(s) Oral once  chlorhexidine 4% Liquid 1 Application(s) Topical <User Schedule>  collagenase Ointment 1 Application(s) Topical daily  dextrose 5%. 1000 milliLiter(s) (50 mL/Hr) IV Continuous <Continuous>  dextrose 50% Injectable 12.5 Gram(s) IV Push once  dextrose 50% Injectable 25 Gram(s) IV Push once  dextrose 50% Injectable 25 Gram(s) IV Push once  fentaNYL   Patch  25 MICROgram(s)/Hr 1 Patch Transdermal every 72 hours  furosemide    Tablet 40 milliGRAM(s) Oral two times a day  gabapentin 300 milliGRAM(s) Oral three times a day  guaiFENesin    Syrup 200 milliGRAM(s) Oral every 6 hours  heparin  Injectable 7500 Unit(s) SubCutaneous every 8 hours  insulin glargine Injectable (LANTUS) 28 Unit(s) SubCutaneous at bedtime  insulin lispro (HumaLOG) corrective regimen sliding scale   SubCutaneous three times a day before meals  insulin lispro (HumaLOG) corrective regimen sliding scale   SubCutaneous at bedtime  insulin lispro Injectable (HumaLOG) 11 Unit(s) SubCutaneous <User Schedule>  insulin lispro Injectable (HumaLOG) 15 Unit(s) SubCutaneous with breakfast  lactobacillus acidophilus 1 Tablet(s) Oral three times a day with meals  levothyroxine 137 MICROGram(s) Oral <User Schedule>  naproxen 500 milliGRAM(s) Oral two times a day  pantoprazole    Tablet 40 milliGRAM(s) Oral before breakfast  potassium chloride   Powder 40 milliEquivalent(s) Oral daily  predniSONE   Tablet 10 milliGRAM(s) Oral daily  psyllium Powder 1 Packet(s) Oral daily  sildenafil (REVATIO) 20 milliGRAM(s) Oral three times a day  simvastatin 20 milliGRAM(s) Oral at bedtime  sodium chloride 0.65% Nasal 2 Spray(s) Both Nostrils four times a day  traMADol 50 milliGRAM(s) Oral two times a day    MEDICATIONS  (PRN):  acetaminophen   Tablet .. 650 milliGRAM(s) Oral every 6 hours PRN Temp greater or equal to 38C (100.4F), Moderate Pain (4 - 6)  dextrose 40% Gel 15 Gram(s) Oral once PRN Blood Glucose LESS THAN 70 milliGRAM(s)/deciLiter  glucagon  Injectable 1 milliGRAM(s) IntraMuscular once PRN Glucose <70 milliGRAM(s)/deciLiter  oxyCODONE    IR 5 milliGRAM(s) Oral every 4 hours PRN moderate and severe pain  sodium chloride 0.65% Nasal 1 Spray(s) Both Nostrils four times a day PRN Nasal Congestion      LABS:                    MICROBIOLOGY:     RADIOLOGY:  [ ] Reviewed and interpreted by me    PULMONARY FUNCTION TESTS:    EKG: CHIEF COMPLAINT: Patient is a 63y old  Female who presents with a chief complaint of SBO and exlap (18 Mar 2019 15:23)      Interval Events: Increased tremors of UE     REVIEW OF SYSTEMS:  Constitutional: No fever or chills   ENT: Sinus pressure with green drainage   CV: Denies   Resp: Denies   GI: Denies   MSK: Weakness   Neurological: Tremors more pronounced   [x ] All other systems negative  [ ] Unable to assess ROS because ________    OBJECTIVE:  ICU Vital Signs Last 24 Hrs  T(C): 36.7 (18 Mar 2019 06:46), Max: 37.1 (17 Mar 2019 15:00)  T(F): 98.1 (18 Mar 2019 06:46), Max: 98.7 (17 Mar 2019 15:00)  HR: 92 (18 Mar 2019 07:15) (87 - 101)  BP: 143/70 (18 Mar 2019 06:46) (114/76 - 143/70)  BP(mean): --  ABP: --  ABP(mean): --  RR: 18 (18 Mar 2019 06:46) (18 - 18)  SpO2: 100% (18 Mar 2019 07:15) (95% - 100%)        CAPILLARY BLOOD GLUCOSE      POCT Blood Glucose.: 116 mg/dL (18 Mar 2019 07:57)          HOSPITAL MEDICATIONS:  MEDICATIONS  (STANDING):  ascorbic acid Syrup 1000 milliGRAM(s) Oral once  chlorhexidine 4% Liquid 1 Application(s) Topical <User Schedule>  collagenase Ointment 1 Application(s) Topical daily  dextrose 5%. 1000 milliLiter(s) (50 mL/Hr) IV Continuous <Continuous>  dextrose 50% Injectable 12.5 Gram(s) IV Push once  dextrose 50% Injectable 25 Gram(s) IV Push once  dextrose 50% Injectable 25 Gram(s) IV Push once  fentaNYL   Patch  25 MICROgram(s)/Hr 1 Patch Transdermal every 72 hours  furosemide    Tablet 40 milliGRAM(s) Oral two times a day  gabapentin 300 milliGRAM(s) Oral three times a day  guaiFENesin    Syrup 200 milliGRAM(s) Oral every 6 hours  heparin  Injectable 7500 Unit(s) SubCutaneous every 8 hours  insulin glargine Injectable (LANTUS) 28 Unit(s) SubCutaneous at bedtime  insulin lispro (HumaLOG) corrective regimen sliding scale   SubCutaneous three times a day before meals  insulin lispro (HumaLOG) corrective regimen sliding scale   SubCutaneous at bedtime  insulin lispro Injectable (HumaLOG) 11 Unit(s) SubCutaneous <User Schedule>  insulin lispro Injectable (HumaLOG) 15 Unit(s) SubCutaneous with breakfast  lactobacillus acidophilus 1 Tablet(s) Oral three times a day with meals  levothyroxine 137 MICROGram(s) Oral <User Schedule>  naproxen 500 milliGRAM(s) Oral two times a day  pantoprazole    Tablet 40 milliGRAM(s) Oral before breakfast  potassium chloride   Powder 40 milliEquivalent(s) Oral daily  predniSONE   Tablet 10 milliGRAM(s) Oral daily  psyllium Powder 1 Packet(s) Oral daily  sildenafil (REVATIO) 20 milliGRAM(s) Oral three times a day  simvastatin 20 milliGRAM(s) Oral at bedtime  sodium chloride 0.65% Nasal 2 Spray(s) Both Nostrils four times a day  traMADol 50 milliGRAM(s) Oral two times a day    MEDICATIONS  (PRN):  acetaminophen   Tablet .. 650 milliGRAM(s) Oral every 6 hours PRN Temp greater or equal to 38C (100.4F), Moderate Pain (4 - 6)  dextrose 40% Gel 15 Gram(s) Oral once PRN Blood Glucose LESS THAN 70 milliGRAM(s)/deciLiter  glucagon  Injectable 1 milliGRAM(s) IntraMuscular once PRN Glucose <70 milliGRAM(s)/deciLiter  oxyCODONE    IR 5 milliGRAM(s) Oral every 4 hours PRN moderate and severe pain  sodium chloride 0.65% Nasal 1 Spray(s) Both Nostrils four times a day PRN Nasal Congestion      LABS:                    MICROBIOLOGY:     RADIOLOGY:  [ ] Reviewed and interpreted by me    PULMONARY FUNCTION TESTS:    EKG:

## 2019-03-18 NOTE — CONSULT NOTE ADULT - ASSESSMENT
Patient is a 63 year old F with multiple medical issues and prolonged hospital course c/b respiratory failure requiring intubation. Patient has h/o mild tremor in bilateral hands at rest and upon reaching, which has progressed over the past couple months and particularly in the past week. Tremor is now so severe that she is having trouble using a phone or holding cups with fluid. Patient reports that tremor improves with resting or when patient is distracted, but is still present. She reports that her brother and sister also have tremors, though none of them have been worked up. Pill rolling tremor is seen, which improves when patient distracted, though characterization is limited 2/2 deformity of hands with RA. Patient also has low amplitude tremor of R foot. Tremor is present at rest and upon reaching. There may be some cogwheeling in elbow, but exam is limited by severe tremor and frozen shoulder. No cogwheeling able to appreciated in wrists due to RA deformity.     Impression: tremor of bilateral hands of unknown etiology, unable to r/o Parkinson's     Plan:  - patient should have MRI head, can be done as inpatient or outpatient  - f/u with outpatient neurology for more comprehensive workup of tremor. ([facility], [address], [address], [place] [address], Phone: [phone]) Patient is a 63 year old F with multiple medical issues and prolonged hospital course c/b respiratory failure requiring intubation. Patient has h/o mild tremor in bilateral hands at rest and upon reaching, which has progressed over the past couple months and particularly in the past week. Tremor is now so severe that she is having trouble using a phone or holding cups with fluid. Patient reports that tremor improves with resting or when patient is distracted, but is still present. She reports that her brother and sister also have tremors, though none of them have been worked up. Pill rolling tremor is seen, which improves when patient distracted, though characterization is limited 2/2 deformity of hands with RA. Patient also has low amplitude tremor of R foot. Tremor is present at rest and upon reaching. There may be some cogwheeling in elbow, but exam is limited by severe tremor and frozen shoulder. No cogwheeling able to appreciated in wrists due to RA deformity.     Impression: tremor of bilateral hands of unknown etiology, unable to r/o Parkinson's     Plan:  - patient should have MRI head, can be done as inpatient or outpatient  -TSH, vitamin B12 , copper and folate and RPR and ammonia levels  - f/u with outpatient neurology for more comprehensive workup of tremor. ([facility] [place], [address], [address], [place] [address], Phone: [phone])

## 2019-03-18 NOTE — CONSULT NOTE ADULT - CONSULT REQUESTED DATE/TIME
04-Mar-2019 01:45
05-Jan-2019 13:07
12-Jan-2019 15:58
15-Mar-2019 12:00
18-Mar-2019 15:24
19-Dec-2018 10:17
19-Feb-2019 13:30
21-Feb-2019 10:00
26-Dec-2018 13:41
27-Dec-2018 15:00
30-Dec-2018 12:30
31-Jan-2019 12:44
06-Mar-2019 09:00
16-Dec-2018 03:30

## 2019-03-18 NOTE — PROGRESS NOTE ADULT - NS MD HP PULSE RADIAL
right normal/left normal
left normal/right normal
right normal
right normal/left normal

## 2019-03-18 NOTE — PROGRESS NOTE ADULT - ASSESSMENT
63 year old female with PMHx of HTN, DM, hypothyroidism, RA (prednisone 10mg), pulmonary HTN, CHRIS, and COPD (3L at home) who was originally admitted on 12/15 for SBO s/p ex lap with lysis of adhesions (4 retention sutures) c/b 12/30 she eviscerated 2/2 a coughing episode s/p ex lap (6 retention sutures) c/b multifocal pneumonia, developed hypercapnic respiratory failure requiring intubation 1/6, s/p bronch 1/9/18, and extubated on 1/24, course further c/b Enterococci bacteremia now readmitted to MICU for hypoxic respiratory failure 2/2 atelectasis requiring intubation 01/31. Had grown ESBL in urine, s/p ertapenem.  Extubated to high flow/ BIPAP.    Problem Selector:  PROBLEM DIAGNOSES  Problem: Acute respiratory failure  Assessment and Plan: - prolonged intubation, now extubated  - BIPAP HS and PRN  - weaned from high flow to nasal cannula, tolerating  - cont sildenafil  - cont steroids  - chest PT    Problem: Enterococcal bacteremia  Assessment and Plan: - had 27 days of Vanc, 3 days of Ceftriaxone, and 8 days of Zosyn for enterococcal bacteremia  - cultures cleared    Problem: ESBL (extended spectrum beta-lactamase) producing bacteria infection  Assessment and Plan: - ESBL in urine  - completed ertapenem on 2/27  - passed TOV    Problem: Type 2 diabetes mellitus  Assessment and Plan: - a1c 7.3  - cont basal / bolus insulin  - endo note appreciated    Problem: SBO (small bowel obstruction)  Assessment and Plan: - SBO s/p ex-lap with findings of healthy bowel c/b evisceration of bowel requiring placement of retention sutures  - sx evaluated wound - retention sutures removed   - cont dressing to wound change 2x day    Problem: Rheumatoid arthritis  Assessment and Plan: - cont prednisone  - pain medications PRN    Problem: Encounter for palliative care  Assessment and Plan: - DNR/DNI at this time as pt does not want tracheostomy  - palliative and ethics note appreciated    Problem: Prophylactic measure  Assessment and Plan: - heparin. 63 year old female with PMHx of HTN, DM, hypothyroidism, RA (prednisone 10mg), pulmonary HTN, CHRIS, and COPD (3L at home) who was originally admitted on 12/15 for SBO s/p ex lap with lysis of adhesions (4 retention sutures) c/b 12/30 she eviscerated 2/2 a coughing episode s/p ex lap (6 retention sutures) c/b multifocal pneumonia, developed hypercapnic respiratory failure requiring intubation 1/6, s/p bronch 1/9/18, and extubated on 1/24, course further c/b Enterococci bacteremia now readmitted to MICU for hypoxic respiratory failure 2/2 atelectasis requiring intubation 01/31. Had grown ESBL in urine, s/p ertapenem.  Extubated to high flow/ BIPAP.

## 2019-03-18 NOTE — CONSULT NOTE ADULT - PROVIDER SPECIALTY LIST ADULT
Endocrinology
Ethics
Gastroenterology
Infectious Disease
Neurology
Palliative Care
Pulmonology
SICU
SICU
Ethics
SICU

## 2019-03-18 NOTE — CONSULT NOTE ADULT - CONSULT REQUESTED BY NAME
Dr. G. Lisker
Dr. Grace
Dr. Hurt
Dr. Spears
Dr. TWAN Bowers
Nolvia Vasquez
Patient ana maría Pisano and Patient Loren Morrow
RCU team
Surgery
True Grace
Dr Lisker
General Surgery

## 2019-03-19 LAB
AMMONIA BLD-MCNC: 21 UMOL/L — SIGNIFICANT CHANGE UP (ref 11–55)
ANION GAP SERPL CALC-SCNC: 17 MMO/L — HIGH (ref 7–14)
BUN SERPL-MCNC: 44 MG/DL — HIGH (ref 7–23)
CALCIUM SERPL-MCNC: 9.7 MG/DL — SIGNIFICANT CHANGE UP (ref 8.4–10.5)
CHLORIDE SERPL-SCNC: 94 MMOL/L — LOW (ref 98–107)
CO2 SERPL-SCNC: 24 MMOL/L — SIGNIFICANT CHANGE UP (ref 22–31)
CREAT SERPL-MCNC: 1.14 MG/DL — SIGNIFICANT CHANGE UP (ref 0.5–1.3)
FOLATE SERPL-MCNC: 11.8 NG/ML — SIGNIFICANT CHANGE UP (ref 4.7–20)
GLUCOSE BLDC GLUCOMTR-MCNC: 120 MG/DL — HIGH (ref 70–99)
GLUCOSE BLDC GLUCOMTR-MCNC: 146 MG/DL — HIGH (ref 70–99)
GLUCOSE BLDC GLUCOMTR-MCNC: 148 MG/DL — HIGH (ref 70–99)
GLUCOSE BLDC GLUCOMTR-MCNC: 84 MG/DL — SIGNIFICANT CHANGE UP (ref 70–99)
GLUCOSE BLDC GLUCOMTR-MCNC: 93 MG/DL — SIGNIFICANT CHANGE UP (ref 70–99)
GLUCOSE SERPL-MCNC: 122 MG/DL — HIGH (ref 70–99)
HCT VFR BLD CALC: 31 % — LOW (ref 34.5–45)
HGB BLD-MCNC: 9.2 G/DL — LOW (ref 11.5–15.5)
MAGNESIUM SERPL-MCNC: 2.2 MG/DL — SIGNIFICANT CHANGE UP (ref 1.6–2.6)
MCHC RBC-ENTMCNC: 27.9 PG — SIGNIFICANT CHANGE UP (ref 27–34)
MCHC RBC-ENTMCNC: 29.7 % — LOW (ref 32–36)
MCV RBC AUTO: 93.9 FL — SIGNIFICANT CHANGE UP (ref 80–100)
NRBC # FLD: 0.03 K/UL — LOW (ref 25–125)
PLATELET # BLD AUTO: 419 K/UL — HIGH (ref 150–400)
PMV BLD: 9.1 FL — SIGNIFICANT CHANGE UP (ref 7–13)
POTASSIUM SERPL-MCNC: 6 MMOL/L — HIGH (ref 3.5–5.3)
POTASSIUM SERPL-SCNC: 6 MMOL/L — HIGH (ref 3.5–5.3)
RBC # BLD: 3.3 M/UL — LOW (ref 3.8–5.2)
RBC # FLD: 17 % — HIGH (ref 10.3–14.5)
SODIUM SERPL-SCNC: 135 MMOL/L — SIGNIFICANT CHANGE UP (ref 135–145)
T4 FREE SERPL-MCNC: 1.43 NG/DL — SIGNIFICANT CHANGE UP (ref 0.9–1.8)
TSH SERPL-MCNC: 6.84 UIU/ML — HIGH (ref 0.27–4.2)
VIT B12 SERPL-MCNC: 937 PG/ML — HIGH (ref 200–900)
WBC # BLD: 13.4 K/UL — HIGH (ref 3.8–10.5)
WBC # FLD AUTO: 13.4 K/UL — HIGH (ref 3.8–10.5)

## 2019-03-19 PROCEDURE — 99232 SBSQ HOSP IP/OBS MODERATE 35: CPT | Mod: GC

## 2019-03-19 RX ORDER — FENTANYL CITRATE 50 UG/ML
1 INJECTION INTRAVENOUS
Qty: 0 | Refills: 0 | DISCHARGE
Start: 2019-03-19

## 2019-03-19 RX ORDER — PSYLLIUM SEED (WITH DEXTROSE)
1 POWDER (GRAM) ORAL
Qty: 0 | Refills: 0 | DISCHARGE
Start: 2019-03-19

## 2019-03-19 RX ORDER — SODIUM CHLORIDE 0.65 %
1 AEROSOL, SPRAY (ML) NASAL
Qty: 0 | Refills: 0 | DISCHARGE
Start: 2019-03-19

## 2019-03-19 RX ORDER — GABAPENTIN 400 MG/1
1 CAPSULE ORAL
Qty: 0 | Refills: 0 | COMMUNITY
Start: 2019-03-19

## 2019-03-19 RX ORDER — ACETAMINOPHEN 500 MG
2 TABLET ORAL
Qty: 0 | Refills: 0 | DISCHARGE
Start: 2019-03-19

## 2019-03-19 RX ORDER — INSULIN HUMAN 100 [IU]/ML
10 INJECTION, SOLUTION SUBCUTANEOUS ONCE
Qty: 0 | Refills: 0 | Status: COMPLETED | OUTPATIENT
Start: 2019-03-19 | End: 2019-03-19

## 2019-03-19 RX ORDER — LEVOTHYROXINE SODIUM 125 MCG
1 TABLET ORAL
Qty: 0 | Refills: 0 | DISCHARGE
Start: 2019-03-19

## 2019-03-19 RX ORDER — INSULIN GLARGINE 100 [IU]/ML
28 INJECTION, SOLUTION SUBCUTANEOUS
Qty: 0 | Refills: 0 | COMMUNITY
Start: 2019-03-19

## 2019-03-19 RX ORDER — CALCIUM GLUCONATE 100 MG/ML
1 VIAL (ML) INTRAVENOUS ONCE
Qty: 0 | Refills: 0 | Status: COMPLETED | OUTPATIENT
Start: 2019-03-19 | End: 2019-03-19

## 2019-03-19 RX ORDER — INSULIN GLARGINE 100 [IU]/ML
20 INJECTION, SOLUTION SUBCUTANEOUS
Qty: 0 | Refills: 0 | DISCHARGE
Start: 2019-03-19

## 2019-03-19 RX ORDER — SIMVASTATIN 20 MG/1
1 TABLET, FILM COATED ORAL
Qty: 0 | Refills: 0 | DISCHARGE
Start: 2019-03-19

## 2019-03-19 RX ORDER — POTASSIUM CHLORIDE 20 MEQ
2 PACKET (EA) ORAL
Qty: 0 | Refills: 0 | COMMUNITY
Start: 2019-03-19

## 2019-03-19 RX ORDER — OXYCODONE HYDROCHLORIDE 5 MG/1
1 TABLET ORAL
Qty: 0 | Refills: 0 | COMMUNITY
Start: 2019-03-19

## 2019-03-19 RX ORDER — FUROSEMIDE 40 MG
1 TABLET ORAL
Qty: 0 | Refills: 0 | DISCHARGE
Start: 2019-03-19

## 2019-03-19 RX ORDER — PANTOPRAZOLE SODIUM 20 MG/1
1 TABLET, DELAYED RELEASE ORAL
Qty: 0 | Refills: 0 | DISCHARGE
Start: 2019-03-19

## 2019-03-19 RX ORDER — COLLAGENASE CLOSTRIDIUM HIST. 250 UNIT/G
1 OINTMENT (GRAM) TOPICAL
Qty: 0 | Refills: 0 | COMMUNITY
Start: 2019-03-19

## 2019-03-19 RX ORDER — TRAMADOL HYDROCHLORIDE 50 MG/1
1 TABLET ORAL
Qty: 0 | Refills: 0 | DISCHARGE
Start: 2019-03-19

## 2019-03-19 RX ORDER — METOPROLOL TARTRATE 50 MG
1 TABLET ORAL
Qty: 0 | Refills: 0 | COMMUNITY

## 2019-03-19 RX ORDER — DEXTROSE 50 % IN WATER 50 %
50 SYRINGE (ML) INTRAVENOUS ONCE
Qty: 0 | Refills: 0 | Status: COMPLETED | OUTPATIENT
Start: 2019-03-19 | End: 2019-03-19

## 2019-03-19 RX ORDER — LACTOBACILLUS ACIDOPHILUS 100MM CELL
1 CAPSULE ORAL
Qty: 0 | Refills: 0 | COMMUNITY
Start: 2019-03-19

## 2019-03-19 RX ADMIN — Medication 1 TABLET(S): at 06:54

## 2019-03-19 RX ADMIN — Medication 200 MILLIGRAM(S): at 17:31

## 2019-03-19 RX ADMIN — Medication 10 MILLIGRAM(S): at 06:54

## 2019-03-19 RX ADMIN — INSULIN GLARGINE 28 UNIT(S): 100 INJECTION, SOLUTION SUBCUTANEOUS at 22:01

## 2019-03-19 RX ADMIN — Medication 1 TABLET(S): at 11:32

## 2019-03-19 RX ADMIN — Medication 200 MILLIGRAM(S): at 06:54

## 2019-03-19 RX ADMIN — Medication 1 TABLET(S): at 17:32

## 2019-03-19 RX ADMIN — HEPARIN SODIUM 7500 UNIT(S): 5000 INJECTION INTRAVENOUS; SUBCUTANEOUS at 06:55

## 2019-03-19 RX ADMIN — SIMVASTATIN 20 MILLIGRAM(S): 20 TABLET, FILM COATED ORAL at 21:45

## 2019-03-19 RX ADMIN — Medication 137 MICROGRAM(S): at 06:54

## 2019-03-19 RX ADMIN — HEPARIN SODIUM 7500 UNIT(S): 5000 INJECTION INTRAVENOUS; SUBCUTANEOUS at 21:45

## 2019-03-19 RX ADMIN — GABAPENTIN 300 MILLIGRAM(S): 400 CAPSULE ORAL at 21:44

## 2019-03-19 RX ADMIN — OXYCODONE HYDROCHLORIDE 5 MILLIGRAM(S): 5 TABLET ORAL at 14:43

## 2019-03-19 RX ADMIN — Medication 500 MILLIGRAM(S): at 21:44

## 2019-03-19 RX ADMIN — OXYCODONE HYDROCHLORIDE 5 MILLIGRAM(S): 5 TABLET ORAL at 08:33

## 2019-03-19 RX ADMIN — Medication 2 SPRAY(S): at 06:55

## 2019-03-19 RX ADMIN — Medication 11 UNIT(S): at 17:32

## 2019-03-19 RX ADMIN — FENTANYL CITRATE 1 PATCH: 50 INJECTION INTRAVENOUS at 08:28

## 2019-03-19 RX ADMIN — TRAMADOL HYDROCHLORIDE 50 MILLIGRAM(S): 50 TABLET ORAL at 07:00

## 2019-03-19 RX ADMIN — Medication 200 GRAM(S): at 14:38

## 2019-03-19 RX ADMIN — Medication 1 APPLICATION(S): at 11:34

## 2019-03-19 RX ADMIN — Medication 2 SPRAY(S): at 11:32

## 2019-03-19 RX ADMIN — Medication 1 TABLET(S): at 08:27

## 2019-03-19 RX ADMIN — Medication 15 UNIT(S): at 08:26

## 2019-03-19 RX ADMIN — Medication 20 MILLIGRAM(S): at 06:54

## 2019-03-19 RX ADMIN — Medication 50 MILLILITER(S): at 14:38

## 2019-03-19 RX ADMIN — Medication 40 MILLIGRAM(S): at 06:54

## 2019-03-19 RX ADMIN — Medication 20 MILLIGRAM(S): at 21:45

## 2019-03-19 RX ADMIN — Medication 2 SPRAY(S): at 17:32

## 2019-03-19 RX ADMIN — FENTANYL CITRATE 1 PATCH: 50 INJECTION INTRAVENOUS at 19:30

## 2019-03-19 RX ADMIN — Medication 40 MILLIGRAM(S): at 17:32

## 2019-03-19 RX ADMIN — OXYCODONE HYDROCHLORIDE 5 MILLIGRAM(S): 5 TABLET ORAL at 09:03

## 2019-03-19 RX ADMIN — PANTOPRAZOLE SODIUM 40 MILLIGRAM(S): 20 TABLET, DELAYED RELEASE ORAL at 06:54

## 2019-03-19 RX ADMIN — OXYCODONE HYDROCHLORIDE 5 MILLIGRAM(S): 5 TABLET ORAL at 23:18

## 2019-03-19 RX ADMIN — GABAPENTIN 300 MILLIGRAM(S): 400 CAPSULE ORAL at 14:37

## 2019-03-19 RX ADMIN — Medication 200 MILLIGRAM(S): at 11:34

## 2019-03-19 RX ADMIN — Medication 11 UNIT(S): at 12:22

## 2019-03-19 RX ADMIN — Medication 500 MILLIGRAM(S): at 12:02

## 2019-03-19 RX ADMIN — INSULIN HUMAN 10 UNIT(S): 100 INJECTION, SOLUTION SUBCUTANEOUS at 14:42

## 2019-03-19 RX ADMIN — CHLORHEXIDINE GLUCONATE 1 APPLICATION(S): 213 SOLUTION TOPICAL at 11:32

## 2019-03-19 RX ADMIN — OXYCODONE HYDROCHLORIDE 5 MILLIGRAM(S): 5 TABLET ORAL at 15:13

## 2019-03-19 RX ADMIN — Medication 500 MILLIGRAM(S): at 11:32

## 2019-03-19 RX ADMIN — TRAMADOL HYDROCHLORIDE 50 MILLIGRAM(S): 50 TABLET ORAL at 07:30

## 2019-03-19 RX ADMIN — Medication 40 MILLIEQUIVALENT(S): at 11:32

## 2019-03-19 RX ADMIN — GABAPENTIN 300 MILLIGRAM(S): 400 CAPSULE ORAL at 06:54

## 2019-03-19 RX ADMIN — TRAMADOL HYDROCHLORIDE 50 MILLIGRAM(S): 50 TABLET ORAL at 17:31

## 2019-03-19 RX ADMIN — Medication 20 MILLIGRAM(S): at 14:37

## 2019-03-19 NOTE — PROGRESS NOTE ADULT - NSICDXPROBLEM_GEN_ALL_CORE_FT
PROBLEM DIAGNOSES  Problem: Acute respiratory failure  Assessment and Plan: - prolonged intubation, now extubated  - BIPAP HS and PRN  - weaned from high flow to nasal cannula, tolerating  - cont sildenafil  - cont steroids  - chest PT    Problem: Enterococcal bacteremia  Assessment and Plan: - had 27 days of Vanc, 3 days of Ceftriaxone, and 8 days of Zosyn for enterococcal bacteremia  - cultures cleared    Problem: ESBL (extended spectrum beta-lactamase) producing bacteria infection  Assessment and Plan: - ESBL in urine  - completed ertapenem on 2/27  - passed TOV    Problem: Type 2 diabetes mellitus  Assessment and Plan: - a1c 7.3  - cont basal / bolus insulin  - endo note appreciated    Problem: SBO (small bowel obstruction)  Assessment and Plan: - SBO s/p ex-lap with findings of healthy bowel c/b evisceration of bowel requiring placement of retention sutures  - sx evaluated wound - retention sutures removed   - cont dressing to wound change 2x day    Problem: Rheumatoid arthritis  Assessment and Plan: - cont prednisone  - pain medications PRN    Problem: Encounter for palliative care  Assessment and Plan: - DNR/DNI at this time as pt does not want tracheostomy  - palliative and ethics note appreciated    Problem: Tremors of nervous system  Assessment and Plan: - neuro note appreciated  - outpatient MRI  - tremors improved today    Problem: Prophylactic measure  Assessment and Plan: - heparin   - stable for discharge

## 2019-03-19 NOTE — PROGRESS NOTE ADULT - PSYCHIATRIC
Affect and characteristics of appearance, verbalizations, behaviors are appropriate
detailed exam
detailed exam

## 2019-03-19 NOTE — PROGRESS NOTE ADULT - SUBJECTIVE AND OBJECTIVE BOX
CHIEF COMPLAINT: Patient is a 63y old  Female who presents with a chief complaint of SBO and exlap (18 Mar 2019 15:23)    Interval Events:      REVIEW OF SYSTEMS:  Constitutional:   Eyes:  ENT:  CV:  Resp:  GI:  :  MSK:  Integumentary:  Neurological:  Psychiatric:  Endocrine:  Hematologic/Lymphatic:  Allergic/Immunologic:  [ ] All other systems negative  [ ] Unable to assess ROS because ________      OBJECTIVE:  ICU Vital Signs Last 24 Hrs  T(C): 36.5 (19 Mar 2019 06:25), Max: 36.9 (18 Mar 2019 14:00)  T(F): 97.7 (19 Mar 2019 06:25), Max: 98.5 (18 Mar 2019 14:00)  HR: 78 (19 Mar 2019 07:23) (78 - 110)  BP: 122/76 (19 Mar 2019 06:25) (114/63 - 127/79)  BP(mean): --  ABP: --  ABP(mean): --  RR: 14 (19 Mar 2019 06:25) (14 - 20)  SpO2: 94% (19 Mar 2019 07:23) (94% - 100%)    POCT Blood Glucose.: 87 mg/dL (18 Mar 2019 21:10)    HOSPITAL MEDICATIONS:  MEDICATIONS  (STANDING):  amoxicillin  875 milliGRAM(s)/clavulanate 1 Tablet(s) Oral two times a day  ascorbic acid Syrup 1000 milliGRAM(s) Oral once  chlorhexidine 4% Liquid 1 Application(s) Topical <User Schedule>  collagenase Ointment 1 Application(s) Topical daily  dextrose 5%. 1000 milliLiter(s) (50 mL/Hr) IV Continuous <Continuous>  dextrose 50% Injectable 12.5 Gram(s) IV Push once  dextrose 50% Injectable 25 Gram(s) IV Push once  dextrose 50% Injectable 25 Gram(s) IV Push once  fentaNYL   Patch  25 MICROgram(s)/Hr 1 Patch Transdermal every 72 hours  furosemide    Tablet 40 milliGRAM(s) Oral two times a day  gabapentin 300 milliGRAM(s) Oral three times a day  guaiFENesin    Syrup 200 milliGRAM(s) Oral every 6 hours  heparin  Injectable 7500 Unit(s) SubCutaneous every 8 hours  insulin glargine Injectable (LANTUS) 28 Unit(s) SubCutaneous at bedtime  insulin lispro (HumaLOG) corrective regimen sliding scale   SubCutaneous three times a day before meals  insulin lispro (HumaLOG) corrective regimen sliding scale   SubCutaneous at bedtime  insulin lispro Injectable (HumaLOG) 11 Unit(s) SubCutaneous <User Schedule>  insulin lispro Injectable (HumaLOG) 15 Unit(s) SubCutaneous with breakfast  lactobacillus acidophilus 1 Tablet(s) Oral three times a day with meals  levothyroxine 137 MICROGram(s) Oral <User Schedule>  naproxen 500 milliGRAM(s) Oral two times a day  pantoprazole    Tablet 40 milliGRAM(s) Oral before breakfast  potassium chloride   Powder 40 milliEquivalent(s) Oral daily  predniSONE   Tablet 10 milliGRAM(s) Oral daily  psyllium Powder 1 Packet(s) Oral daily  sildenafil (REVATIO) 20 milliGRAM(s) Oral three times a day  simvastatin 20 milliGRAM(s) Oral at bedtime  sodium chloride 0.65% Nasal 2 Spray(s) Both Nostrils four times a day  traMADol 50 milliGRAM(s) Oral two times a day    MEDICATIONS  (PRN):  acetaminophen   Tablet .. 650 milliGRAM(s) Oral every 6 hours PRN Temp greater or equal to 38C (100.4F), Moderate Pain (4 - 6)  dextrose 40% Gel 15 Gram(s) Oral once PRN Blood Glucose LESS THAN 70 milliGRAM(s)/deciLiter  glucagon  Injectable 1 milliGRAM(s) IntraMuscular once PRN Glucose <70 milliGRAM(s)/deciLiter  oxyCODONE    IR 5 milliGRAM(s) Oral every 4 hours PRN moderate and severe pain  sodium chloride 0.65% Nasal 1 Spray(s) Both Nostrils four times a day PRN Nasal Congestion      LABS:                    MICROBIOLOGY:     RADIOLOGY:  [ ] Reviewed and interpreted by me    PULMONARY FUNCTION TESTS:    EKG: CHIEF COMPLAINT: Patient is a 63y old  Female who presents with a chief complaint of SBO and exlap (18 Mar 2019 15:23)    Interval Events: none overnight      REVIEW OF SYSTEMS:  Constitutional: no complaints  CV: denies  Resp: denies  GI: denies  MSK: tremors improved  [x] All other systems negative  [ ] Unable to assess ROS because ________      OBJECTIVE:  ICU Vital Signs Last 24 Hrs  T(C): 36.5 (19 Mar 2019 06:25), Max: 36.9 (18 Mar 2019 14:00)  T(F): 97.7 (19 Mar 2019 06:25), Max: 98.5 (18 Mar 2019 14:00)  HR: 78 (19 Mar 2019 07:23) (78 - 110)  BP: 122/76 (19 Mar 2019 06:25) (114/63 - 127/79)  BP(mean): --  ABP: --  ABP(mean): --  RR: 14 (19 Mar 2019 06:25) (14 - 20)  SpO2: 94% (19 Mar 2019 07:23) (94% - 100%)    POCT Blood Glucose.: 87 mg/dL (18 Mar 2019 21:10)    HOSPITAL MEDICATIONS:  MEDICATIONS  (STANDING):  amoxicillin  875 milliGRAM(s)/clavulanate 1 Tablet(s) Oral two times a day  ascorbic acid Syrup 1000 milliGRAM(s) Oral once  chlorhexidine 4% Liquid 1 Application(s) Topical <User Schedule>  collagenase Ointment 1 Application(s) Topical daily  dextrose 5%. 1000 milliLiter(s) (50 mL/Hr) IV Continuous <Continuous>  dextrose 50% Injectable 12.5 Gram(s) IV Push once  dextrose 50% Injectable 25 Gram(s) IV Push once  dextrose 50% Injectable 25 Gram(s) IV Push once  fentaNYL   Patch  25 MICROgram(s)/Hr 1 Patch Transdermal every 72 hours  furosemide    Tablet 40 milliGRAM(s) Oral two times a day  gabapentin 300 milliGRAM(s) Oral three times a day  guaiFENesin    Syrup 200 milliGRAM(s) Oral every 6 hours  heparin  Injectable 7500 Unit(s) SubCutaneous every 8 hours  insulin glargine Injectable (LANTUS) 28 Unit(s) SubCutaneous at bedtime  insulin lispro (HumaLOG) corrective regimen sliding scale   SubCutaneous three times a day before meals  insulin lispro (HumaLOG) corrective regimen sliding scale   SubCutaneous at bedtime  insulin lispro Injectable (HumaLOG) 11 Unit(s) SubCutaneous <User Schedule>  insulin lispro Injectable (HumaLOG) 15 Unit(s) SubCutaneous with breakfast  lactobacillus acidophilus 1 Tablet(s) Oral three times a day with meals  levothyroxine 137 MICROGram(s) Oral <User Schedule>  naproxen 500 milliGRAM(s) Oral two times a day  pantoprazole    Tablet 40 milliGRAM(s) Oral before breakfast  potassium chloride   Powder 40 milliEquivalent(s) Oral daily  predniSONE   Tablet 10 milliGRAM(s) Oral daily  psyllium Powder 1 Packet(s) Oral daily  sildenafil (REVATIO) 20 milliGRAM(s) Oral three times a day  simvastatin 20 milliGRAM(s) Oral at bedtime  sodium chloride 0.65% Nasal 2 Spray(s) Both Nostrils four times a day  traMADol 50 milliGRAM(s) Oral two times a day    MEDICATIONS  (PRN):  acetaminophen   Tablet .. 650 milliGRAM(s) Oral every 6 hours PRN Temp greater or equal to 38C (100.4F), Moderate Pain (4 - 6)  dextrose 40% Gel 15 Gram(s) Oral once PRN Blood Glucose LESS THAN 70 milliGRAM(s)/deciLiter  glucagon  Injectable 1 milliGRAM(s) IntraMuscular once PRN Glucose <70 milliGRAM(s)/deciLiter  oxyCODONE    IR 5 milliGRAM(s) Oral every 4 hours PRN moderate and severe pain  sodium chloride 0.65% Nasal 1 Spray(s) Both Nostrils four times a day PRN Nasal Congestion

## 2019-03-20 VITALS — OXYGEN SATURATION: 110 % | HEART RATE: 96 BPM

## 2019-03-20 LAB
GLUCOSE BLDC GLUCOMTR-MCNC: 148 MG/DL — HIGH (ref 70–99)
GLUCOSE BLDC GLUCOMTR-MCNC: 151 MG/DL — HIGH (ref 70–99)
GLUCOSE BLDC GLUCOMTR-MCNC: 172 MG/DL — HIGH (ref 70–99)
POTASSIUM SERPL-MCNC: 4.3 MMOL/L — SIGNIFICANT CHANGE UP (ref 3.5–5.3)
POTASSIUM SERPL-SCNC: 4.3 MMOL/L — SIGNIFICANT CHANGE UP (ref 3.5–5.3)
T PALLIDUM AB TITR SER: NEGATIVE — SIGNIFICANT CHANGE UP

## 2019-03-20 PROCEDURE — 99232 SBSQ HOSP IP/OBS MODERATE 35: CPT | Mod: GC

## 2019-03-20 PROCEDURE — 99231 SBSQ HOSP IP/OBS SF/LOW 25: CPT | Mod: GC

## 2019-03-20 RX ORDER — PRIMIDONE 250 MG/1
25 TABLET ORAL
Qty: 0 | Refills: 0 | COMMUNITY
Start: 2019-03-20

## 2019-03-20 RX ORDER — PRIMIDONE 250 MG/1
25 TABLET ORAL DAILY
Qty: 0 | Refills: 0 | Status: DISCONTINUED | OUTPATIENT
Start: 2019-03-20 | End: 2019-03-20

## 2019-03-20 RX ADMIN — Medication 1 TABLET(S): at 08:19

## 2019-03-20 RX ADMIN — CHLORHEXIDINE GLUCONATE 1 APPLICATION(S): 213 SOLUTION TOPICAL at 10:48

## 2019-03-20 RX ADMIN — GABAPENTIN 300 MILLIGRAM(S): 400 CAPSULE ORAL at 07:01

## 2019-03-20 RX ADMIN — Medication 200 MILLIGRAM(S): at 07:00

## 2019-03-20 RX ADMIN — Medication 2 SPRAY(S): at 12:12

## 2019-03-20 RX ADMIN — Medication 40 MILLIGRAM(S): at 07:01

## 2019-03-20 RX ADMIN — Medication 2 SPRAY(S): at 07:01

## 2019-03-20 RX ADMIN — HEPARIN SODIUM 7500 UNIT(S): 5000 INJECTION INTRAVENOUS; SUBCUTANEOUS at 07:00

## 2019-03-20 RX ADMIN — OXYCODONE HYDROCHLORIDE 5 MILLIGRAM(S): 5 TABLET ORAL at 00:00

## 2019-03-20 RX ADMIN — OXYCODONE HYDROCHLORIDE 5 MILLIGRAM(S): 5 TABLET ORAL at 16:20

## 2019-03-20 RX ADMIN — OXYCODONE HYDROCHLORIDE 5 MILLIGRAM(S): 5 TABLET ORAL at 11:18

## 2019-03-20 RX ADMIN — Medication 15 UNIT(S): at 08:19

## 2019-03-20 RX ADMIN — Medication 20 MILLIGRAM(S): at 07:00

## 2019-03-20 RX ADMIN — TRAMADOL HYDROCHLORIDE 50 MILLIGRAM(S): 50 TABLET ORAL at 07:00

## 2019-03-20 RX ADMIN — PRIMIDONE 25 MILLIGRAM(S): 250 TABLET ORAL at 16:21

## 2019-03-20 RX ADMIN — OXYCODONE HYDROCHLORIDE 5 MILLIGRAM(S): 5 TABLET ORAL at 10:48

## 2019-03-20 RX ADMIN — Medication 200 MILLIGRAM(S): at 12:12

## 2019-03-20 RX ADMIN — Medication 2: at 08:19

## 2019-03-20 RX ADMIN — Medication 137 MICROGRAM(S): at 07:00

## 2019-03-20 RX ADMIN — Medication 1 TABLET(S): at 07:00

## 2019-03-20 RX ADMIN — Medication 10 MILLIGRAM(S): at 07:01

## 2019-03-20 RX ADMIN — Medication 1 TABLET(S): at 12:12

## 2019-03-20 RX ADMIN — FENTANYL CITRATE 1 PATCH: 50 INJECTION INTRAVENOUS at 07:40

## 2019-03-20 RX ADMIN — GABAPENTIN 300 MILLIGRAM(S): 400 CAPSULE ORAL at 13:54

## 2019-03-20 RX ADMIN — PANTOPRAZOLE SODIUM 40 MILLIGRAM(S): 20 TABLET, DELAYED RELEASE ORAL at 07:01

## 2019-03-20 RX ADMIN — Medication 11 UNIT(S): at 12:12

## 2019-03-20 RX ADMIN — Medication 1 APPLICATION(S): at 12:12

## 2019-03-20 RX ADMIN — Medication 20 MILLIGRAM(S): at 13:54

## 2019-03-20 NOTE — PROGRESS NOTE ADULT - CVS HE PE MLT D E PC
no rub/regular rate and rhythm/no murmur
regular rate and rhythm/no rub/no murmur
regular rate and rhythm
regular rate and rhythm/no rub/no murmur
regular rate and rhythm
regular rate and rhythm
regular rate and rhythm/no rub/no murmur
regular rate and rhythm
regular rate and rhythm
regular rate and rhythm/no murmur/no rub
no murmur/regular rate and rhythm/no rub
regular rate and rhythm/no murmur/no rub
regular rate and rhythm/no rub/no murmur
Tachy rate, regular rhythm
regular rate and rhythm

## 2019-03-20 NOTE — PROGRESS NOTE ADULT - PROVIDER SPECIALTY LIST ADULT
Anesthesia
Critical Care
Endocrinology
Ethics
Gastroenterology
Infectious Disease
MICU
Neurology
Pain Medicine
Palliative Care
Palliative Care
Pulmonology
SICU
Surgery
Anesthesia
Critical Care
MICU
MICU
Pain Medicine
SICU
Surgery
Surgery
MICU
Pulmonology
SICU
Surgery
Surgery
Infectious Disease
Pulmonology
Infectious Disease
Endocrinology
Pulmonology

## 2019-03-20 NOTE — PROGRESS NOTE ADULT - PROBLEM SELECTOR PROBLEM 5
ESBL (extended spectrum beta-lactamase) producing bacteria infection
Type 2 diabetes mellitus
CHRIS (obstructive sleep apnea)
ESBL (extended spectrum beta-lactamase) producing bacteria infection
Encounter for palliative care
Encounter for palliative care
CHRIS (obstructive sleep apnea)
ESBL (extended spectrum beta-lactamase) producing bacteria infection
ESBL (extended spectrum beta-lactamase) producing bacteria infection
RA (Rheumatoid Arthritis)
ESBL (extended spectrum beta-lactamase) producing bacteria infection
ESBL (extended spectrum beta-lactamase) producing bacteria infection
RA (Rheumatoid Arthritis)
ESBL (extended spectrum beta-lactamase) producing bacteria infection

## 2019-03-20 NOTE — PROGRESS NOTE ADULT - ASSESSMENT
Patient is a 63 year old F with multiple medical issues and prolonged hospital course c/b respiratory failure requiring intubation. Patient has h/o mild tremor in bilateral hands at rest and upon reaching, which has progressed over the past couple months and particularly in the past week. Tremor is now so severe that she is having trouble using a phone or holding cups with fluid. Patient reports that tremor improves with resting or when patient is distracted, but is still present. She reports that her brother and sister also have tremors, though none of them have been worked up. Pill rolling tremor is seen, which improves when patient distracted, though characterization is limited 2/2 deformity of hands with RA. Patient also has low amplitude tremor of R foot. Tremor is present at rest and upon reaching. There may be some cogwheeling in elbow, but exam is limited by severe tremor and frozen shoulder. No cogwheeling able to appreciated in wrists due to RA deformity.     Impression: tremor of bilateral hands of unknown etiology, unable to r/o Parkinson's     Plan:  - patient should have MRI head, can be done as inpatient or outpatient  - Please start Primidone 25 mg daily   - f/u with outpatient neurology for more comprehensive workup of tremor at [phone].

## 2019-03-20 NOTE — CHART NOTE - NSCHARTNOTEFT_GEN_A_CORE
Patient to be discharged to rehab facility today-    Would c/w current inulin regimen at rehab facility  so  Lantus 28 units sq qhs  humalog 15/11/11   and mod correction scale ac/hs    For home discharge- pt should be placed back on home regimen of Tresiba 30 units sq qhs, Januvia 50 mg po daily and prandin 1 mg po tid ac _hold for NPO.    Patient can follow up and endocrine faculty practice post rehab d/c if chooses to, ph: [phone]  or with PCP.

## 2019-03-20 NOTE — PROGRESS NOTE ADULT - ATTENDING COMMENTS
Discharge held yesterday due to hyperkalemia, in setting of potassium supplementation.  Now potassium level is normal.  Stable for discharge.  Outpatient neurology follow up.

## 2019-03-20 NOTE — PROGRESS NOTE ADULT - PROBLEM SELECTOR PROBLEM 4
Fever
Medication monitoring encounter
Functional quadriplegia
Chronic Asthma
Fever
Chronic Asthma
Chronic Asthma
Fever
Functional quadriplegia
Functional quadriplegia
Chronic Asthma
Fever
Fever
SBO (small bowel obstruction)
Fever
Fever
SBO (small bowel obstruction)
Fever

## 2019-03-20 NOTE — PROGRESS NOTE ADULT - PROBLEM SELECTOR PROBLEM 9
Adult Hypothyroidism
Conflicted attitude towards treatment
Prophylactic measure
Conflicted attitude towards treatment
Prophylactic measure
Conflicted attitude towards treatment

## 2019-03-20 NOTE — PROGRESS NOTE ADULT - PROBLEM SELECTOR PROBLEM 3
Fever
Rheumatoid arthritis
ESBL (extended spectrum beta-lactamase) producing bacteria infection
Long term (current) use of systemic steroids
Acute on chronic respiratory failure
Rheumatoid arthritis
Acute on chronic respiratory failure
Acute on chronic respiratory failure
Long term (current) use of systemic steroids
Rheumatoid arthritis
SBO (small bowel obstruction)
SBO (small bowel obstruction)
Acute on chronic respiratory failure
Rheumatoid arthritis
Rheumatoid arthritis
Type 2 diabetes mellitus with hypoglycemia, with long-term current use of insulin
Rheumatoid arthritis
Rheumatoid arthritis
Type 2 diabetes mellitus with hypoglycemia, with long-term current use of insulin
Rheumatoid arthritis

## 2019-03-20 NOTE — PROGRESS NOTE ADULT - EXTREMITIES COMMENTS
rheumatoid hands
RA hands  limited ROM
RA hands
hyperpigmentation to lower extremities
rheumatoid hands
decreased ROM hands and wrist 2/2 RA
trace pedal edema
+ blister on lower shin - clear fluid filled

## 2019-03-20 NOTE — PROGRESS NOTE ADULT - PROBLEM SELECTOR PLAN 5
- ESBL in urine  - completed ertapenem on 2/27  - passed TOV
Discussion had with patients son and daughter. They voiced their complaints about the care of their mother and were upset about it. They wanted to know next steps, but as per Norris, she would never want to be placed in a nursing home. They needed more time to think about the next steps. Emotional support was provided.
bipap o/n
see goc  meeting face to face with pt and pt's family from 2pm to 2:45pm
- ESBL in urine  - completed ertapenem on 2/27  - passed TOV
bipap o/n
- ESBL in urine  - completed ertapenem on 2/27  - passed TOV
- stable
- ESBL in urine  - completed ertapenem on 2/27  - passed TOV
- ESBL in urine  - completed ertapenem on 2/27  - passed TOV
- stable
- ESBL in urine  - completed ertapenem on 2/27  - iraheta removed for TOV today
- a1c 7.3  - cont basal / bolus insulin  - endo note appreciated

## 2019-03-20 NOTE — PROGRESS NOTE ADULT - MINUTES
35
40
40
20
35
40
35
35
40
40
55
35
45
55
66
30
35
45
55
66
35
25
35
25
35

## 2019-03-20 NOTE — PROGRESS NOTE ADULT - PROBLEM SELECTOR PROBLEM 8
Need for prophylactic measure
SBO (small bowel obstruction)
Need for prophylactic measure
RA (Rheumatoid Arthritis)
Need for prophylactic measure
RA (Rheumatoid Arthritis)
Need for prophylactic measure

## 2019-03-20 NOTE — PROGRESS NOTE ADULT - PROBLEM SELECTOR PROBLEM 6
Type 2 diabetes mellitus
Tremors of nervous system
Obesity
Type 2 diabetes mellitus
Obesity
Type 2 diabetes mellitus
Type 2 diabetes mellitus
Need for prophylactic measure
Type 2 diabetes mellitus
Type 2 diabetes mellitus
Need for prophylactic measure
Type 2 diabetes mellitus

## 2019-03-20 NOTE — PROGRESS NOTE ADULT - NS NEC GEN PE MLT EXAM PC
No bruits; no thyromegaly or nodules
detailed exam
No bruits; no thyromegaly or nodules
detailed exam

## 2019-03-20 NOTE — PROGRESS NOTE ADULT - PROBLEM SELECTOR PLAN 2
- had 27 days of Vanc, 3 days of Ceftriaxone, and 8 days of Zosyn for enterococcal bacteremia  - cultures cleared

## 2019-03-20 NOTE — PROGRESS NOTE ADULT - SUBJECTIVE AND OBJECTIVE BOX
Neurology Follow up note    Patient is a 63y old  Female who presents with a chief complaint of SBO and exlap (18 Mar 2019 15:23)      Subjective:Interval History - No events overnight.    Objective:   Vital Signs Last 24 Hrs  T(C): 37.1 (20 Mar 2019 13:50), Max: 37.1 (20 Mar 2019 13:50)  T(F): 98.8 (20 Mar 2019 13:50), Max: 98.8 (20 Mar 2019 13:50)  HR: 96 (20 Mar 2019 15:50) (91 - 110)  BP: 125/60 (20 Mar 2019 13:50) (108/66 - 134/76)  BP(mean): --  RR: 19 (20 Mar 2019 13:50) (16 - 19)  SpO2: 110% (20 Mar 2019 15:50) (93% - 110%)    General Exam:   General appearance: No acute distress                   Neurological Exam:    Mental Status: Orientated to self, date and place.  Attention intact.  No dysarthria, aphasia or neglect.  Cranial Nerves: PERRL, EOMI, CN V1-3 intact to light touch and pinprick.  No facial asymmetry, Tongue, uvula and palate midline.    Motor:   Tone: Possible cogwheeling in L arm, though exam limited by shoulder freeze and severe tremor.                 Strength: strength 4+/5 in all extremities. No drift.   Dysmetria: None to finger-nose-finger  No truncal ataxia.    Tremor: Resting tremor in bilateral hands and R foot. c/w pill rolling tremor, but cannot confirm 2/2 RA deformity. Of note, tremor decreased in amplitude when patient distracted.   Sensation: intact to light touch, sensation diminished in RLE, which she states has been consistent since MICU.         03-19    x   |  x   |  x   ----------------------------<  x   4.3   |  x   |  x     Ca    9.7      19 Mar 2019 12:39  Mg     2.2     03-19 03-19    x   |  x   |  x   ----------------------------<  x   4.3   |  x   |  x     Ca    9.7      19 Mar 2019 12:39  Mg     2.2     03-19                              9.2    13.40 )-----------( 419      ( 19 Mar 2019 12:50 )             31.0         MEDICATIONS  (STANDING):  amoxicillin  875 milliGRAM(s)/clavulanate 1 Tablet(s) Oral two times a day  ascorbic acid Syrup 1000 milliGRAM(s) Oral once  chlorhexidine 4% Liquid 1 Application(s) Topical <User Schedule>  collagenase Ointment 1 Application(s) Topical daily  dextrose 5%. 1000 milliLiter(s) (50 mL/Hr) IV Continuous <Continuous>  dextrose 50% Injectable 12.5 Gram(s) IV Push once  dextrose 50% Injectable 25 Gram(s) IV Push once  dextrose 50% Injectable 25 Gram(s) IV Push once  fentaNYL   Patch  25 MICROgram(s)/Hr 1 Patch Transdermal every 72 hours  furosemide    Tablet 40 milliGRAM(s) Oral two times a day  gabapentin 300 milliGRAM(s) Oral three times a day  guaiFENesin    Syrup 200 milliGRAM(s) Oral every 6 hours  heparin  Injectable 7500 Unit(s) SubCutaneous every 8 hours  insulin glargine Injectable (LANTUS) 28 Unit(s) SubCutaneous at bedtime  insulin lispro (HumaLOG) corrective regimen sliding scale   SubCutaneous three times a day before meals  insulin lispro (HumaLOG) corrective regimen sliding scale   SubCutaneous at bedtime  insulin lispro Injectable (HumaLOG) 11 Unit(s) SubCutaneous <User Schedule>  insulin lispro Injectable (HumaLOG) 15 Unit(s) SubCutaneous with breakfast  lactobacillus acidophilus 1 Tablet(s) Oral three times a day with meals  levothyroxine 137 MICROGram(s) Oral <User Schedule>  naproxen 500 milliGRAM(s) Oral two times a day  pantoprazole    Tablet 40 milliGRAM(s) Oral before breakfast  predniSONE   Tablet 10 milliGRAM(s) Oral daily  primidone 25 milliGRAM(s) Oral daily  psyllium Powder 1 Packet(s) Oral daily  sildenafil (REVATIO) 20 milliGRAM(s) Oral three times a day  simvastatin 20 milliGRAM(s) Oral at bedtime  sodium chloride 0.65% Nasal 2 Spray(s) Both Nostrils four times a day  traMADol 50 milliGRAM(s) Oral two times a day    MEDICATIONS  (PRN):  acetaminophen   Tablet .. 650 milliGRAM(s) Oral every 6 hours PRN Temp greater or equal to 38C (100.4F), Moderate Pain (4 - 6)  dextrose 40% Gel 15 Gram(s) Oral once PRN Blood Glucose LESS THAN 70 milliGRAM(s)/deciLiter  glucagon  Injectable 1 milliGRAM(s) IntraMuscular once PRN Glucose <70 milliGRAM(s)/deciLiter  oxyCODONE    IR 5 milliGRAM(s) Oral every 4 hours PRN moderate and severe pain  sodium chloride 0.65% Nasal 1 Spray(s) Both Nostrils four times a day PRN Nasal Congestion

## 2019-03-20 NOTE — PROGRESS NOTE ADULT - NSHPATTENDINGPLANDISCUSS_GEN_ALL_CORE
SICU
SICU
ICU attneding re abx plan and joanie results
MICU
ICU attneding re abx plan and joanie results
MICU
SICU
SICU
MICU resident
patient, SICU team
pulmonary
MICU team
MICU team
patient, RCU team
MICU team
patient and son and medical and neurology teams.
MICU team
Pulmonary team
SICU team
fellow, ICU team
Gen Surg
MICU Team
MICU team
SICU team
SICU team
pulmonologist, B team, CMO
son
MICU Team
fellow, family, SICU and MICU attendings
patient, patient's two sons, SICU and B teams, SW
son
SICU team
fellow, ICU team
family at bedside
team
fellow
patient, RCU team
pt, son and rcu team
Norris, U team
patient, RCU team
patient, RCU team
patient, son, daughter, RCU team
RCU team
patient, RCU
patient, RCU team

## 2019-03-20 NOTE — PROGRESS NOTE ADULT - PROBLEM SELECTOR PLAN 1
- prolonged intubation, now extubated  - BIPAP HS and PRN  - weaned from high flow to nasal cannula, tolerating  - cont sildenafil  - cont steroids  - chest PT

## 2019-03-20 NOTE — PROGRESS NOTE ADULT - PROBLEM SELECTOR PROBLEM 2
Adult Hypothyroidism
Bacteremia
Enterococcal bacteremia
Enterococcal bacteremia
Adult Hypothyroidism
Bacteremia
Bacteremia
Enterococcal bacteremia
Rheumatoid arthritis
Rheumatoid arthritis
Type 2 diabetes mellitus with hypoglycemia, with long-term current use of insulin
Bacteremia
Enterococcal bacteremia
Enterococcal bacteremia
Influenza
Enterococcal bacteremia
Influenza
Enterococcal bacteremia
Acute respiratory failure

## 2019-03-20 NOTE — PROGRESS NOTE ADULT - PROBLEM SELECTOR PROBLEM 7
SBO (small bowel obstruction)
RA (Rheumatoid Arthritis)
SBO (small bowel obstruction)
Type 2 diabetes mellitus with hypoglycemia, with long-term current use of insulin
SBO (small bowel obstruction)
Type 2 diabetes mellitus with hypoglycemia, with long-term current use of insulin
SBO (small bowel obstruction)

## 2019-03-20 NOTE — PROGRESS NOTE ADULT - ATTENDING COMMENTS
Patient seen and examined and above note reviewed and I agree with assessment and plan as outlined. Patient reports her arm tremors are interfering with her daily activities and so would like to start a medication to treat them.  Therefore, will try primidone 25 mg daily and titrate up as tolerated with outpatient neurology follow up after rehab at [phone] and she understood plan and all questions answered.

## 2019-03-20 NOTE — PROGRESS NOTE ADULT - CONSTITUTIONAL DETAILS
no distress/obese
obese/no distress
no distress
respiratory distress
no distress/obese
well-nourished/no distress
no distress/obese
no distress
no distress/obese
in micu, intubated/no distress
no distress/obese
no distress/obese
+ COREAS/respiratory distress/no distress
no distress/obese
well-nourished/respiratory distress
respiratory distress/well-nourished
well-nourished/respiratory distress
respiratory distress
well-nourished/respiratory distress

## 2019-03-20 NOTE — PROGRESS NOTE ADULT - SUBJECTIVE AND OBJECTIVE BOX
CHIEF COMPLAINT: Patient is a 63y old  Female who presents with a chief complaint of SBO and exlap (18 Mar 2019 15:23)    Interval Events:      REVIEW OF SYSTEMS:  Constitutional:   Eyes:  ENT:  CV:  Resp:  GI:  :  MSK:  Integumentary:  Neurological:  Psychiatric:  Endocrine:  Hematologic/Lymphatic:  Allergic/Immunologic:  [ ] All other systems negative  [ ] Unable to assess ROS because ________      OBJECTIVE:  ICU Vital Signs Last 24 Hrs  T(C): 36.9 (20 Mar 2019 06:30), Max: 36.9 (20 Mar 2019 06:30)  T(F): 98.5 (20 Mar 2019 06:30), Max: 98.5 (20 Mar 2019 06:30)  HR: 97 (20 Mar 2019 07:14) (75 - 110)  BP: 115/71 (20 Mar 2019 06:30) (108/66 - 134/76)  BP(mean): --  ABP: --  ABP(mean): --  RR: 18 (20 Mar 2019 06:30) (15 - 18)  SpO2: 100% (20 Mar 2019 07:14) (93% - 100%)    POCT Blood Glucose.: 93 mg/dL (19 Mar 2019 21:58)    HOSPITAL MEDICATIONS:  MEDICATIONS  (STANDING):  amoxicillin  875 milliGRAM(s)/clavulanate 1 Tablet(s) Oral two times a day  ascorbic acid Syrup 1000 milliGRAM(s) Oral once  chlorhexidine 4% Liquid 1 Application(s) Topical <User Schedule>  collagenase Ointment 1 Application(s) Topical daily  dextrose 5%. 1000 milliLiter(s) (50 mL/Hr) IV Continuous <Continuous>  dextrose 50% Injectable 12.5 Gram(s) IV Push once  dextrose 50% Injectable 25 Gram(s) IV Push once  dextrose 50% Injectable 25 Gram(s) IV Push once  fentaNYL   Patch  25 MICROgram(s)/Hr 1 Patch Transdermal every 72 hours  furosemide    Tablet 40 milliGRAM(s) Oral two times a day  gabapentin 300 milliGRAM(s) Oral three times a day  guaiFENesin    Syrup 200 milliGRAM(s) Oral every 6 hours  heparin  Injectable 7500 Unit(s) SubCutaneous every 8 hours  insulin glargine Injectable (LANTUS) 28 Unit(s) SubCutaneous at bedtime  insulin lispro (HumaLOG) corrective regimen sliding scale   SubCutaneous three times a day before meals  insulin lispro (HumaLOG) corrective regimen sliding scale   SubCutaneous at bedtime  insulin lispro Injectable (HumaLOG) 11 Unit(s) SubCutaneous <User Schedule>  insulin lispro Injectable (HumaLOG) 15 Unit(s) SubCutaneous with breakfast  lactobacillus acidophilus 1 Tablet(s) Oral three times a day with meals  levothyroxine 137 MICROGram(s) Oral <User Schedule>  naproxen 500 milliGRAM(s) Oral two times a day  pantoprazole    Tablet 40 milliGRAM(s) Oral before breakfast  predniSONE   Tablet 10 milliGRAM(s) Oral daily  psyllium Powder 1 Packet(s) Oral daily  sildenafil (REVATIO) 20 milliGRAM(s) Oral three times a day  simvastatin 20 milliGRAM(s) Oral at bedtime  sodium chloride 0.65% Nasal 2 Spray(s) Both Nostrils four times a day  traMADol 50 milliGRAM(s) Oral two times a day    MEDICATIONS  (PRN):  acetaminophen   Tablet .. 650 milliGRAM(s) Oral every 6 hours PRN Temp greater or equal to 38C (100.4F), Moderate Pain (4 - 6)  dextrose 40% Gel 15 Gram(s) Oral once PRN Blood Glucose LESS THAN 70 milliGRAM(s)/deciLiter  glucagon  Injectable 1 milliGRAM(s) IntraMuscular once PRN Glucose <70 milliGRAM(s)/deciLiter  oxyCODONE    IR 5 milliGRAM(s) Oral every 4 hours PRN moderate and severe pain  sodium chloride 0.65% Nasal 1 Spray(s) Both Nostrils four times a day PRN Nasal Congestion      LABS:                        9.2    13.40 )-----------( 419      ( 19 Mar 2019 12:50 )             31.0     03-19    x   |  x   |  x   ----------------------------<  x   4.3   |  x   |  x     Ca    9.7      19 Mar 2019 12:39  Mg     2.2     03-19                MICROBIOLOGY:     RADIOLOGY:  [ ] Reviewed and interpreted by me    PULMONARY FUNCTION TESTS:    EKG: CHIEF COMPLAINT: Patient is a 63y old  Female who presents with a chief complaint of SBO and exlap (18 Mar 2019 15:23)    Interval Events: none overnight      REVIEW OF SYSTEMS:  Constitutional: no complaints  CV: denies  Resp: denies  GI: denies  [x] All other systems negative  [ ] Unable to assess ROS because ________      OBJECTIVE:  ICU Vital Signs Last 24 Hrs  T(C): 36.9 (20 Mar 2019 06:30), Max: 36.9 (20 Mar 2019 06:30)  T(F): 98.5 (20 Mar 2019 06:30), Max: 98.5 (20 Mar 2019 06:30)  HR: 97 (20 Mar 2019 07:14) (75 - 110)  BP: 115/71 (20 Mar 2019 06:30) (108/66 - 134/76)  BP(mean): --  ABP: --  ABP(mean): --  RR: 18 (20 Mar 2019 06:30) (15 - 18)  SpO2: 100% (20 Mar 2019 07:14) (93% - 100%)    POCT Blood Glucose.: 93 mg/dL (19 Mar 2019 21:58)    HOSPITAL MEDICATIONS:  MEDICATIONS  (STANDING):  amoxicillin  875 milliGRAM(s)/clavulanate 1 Tablet(s) Oral two times a day  ascorbic acid Syrup 1000 milliGRAM(s) Oral once  chlorhexidine 4% Liquid 1 Application(s) Topical <User Schedule>  collagenase Ointment 1 Application(s) Topical daily  dextrose 5%. 1000 milliLiter(s) (50 mL/Hr) IV Continuous <Continuous>  dextrose 50% Injectable 12.5 Gram(s) IV Push once  dextrose 50% Injectable 25 Gram(s) IV Push once  dextrose 50% Injectable 25 Gram(s) IV Push once  fentaNYL   Patch  25 MICROgram(s)/Hr 1 Patch Transdermal every 72 hours  furosemide    Tablet 40 milliGRAM(s) Oral two times a day  gabapentin 300 milliGRAM(s) Oral three times a day  guaiFENesin    Syrup 200 milliGRAM(s) Oral every 6 hours  heparin  Injectable 7500 Unit(s) SubCutaneous every 8 hours  insulin glargine Injectable (LANTUS) 28 Unit(s) SubCutaneous at bedtime  insulin lispro (HumaLOG) corrective regimen sliding scale   SubCutaneous three times a day before meals  insulin lispro (HumaLOG) corrective regimen sliding scale   SubCutaneous at bedtime  insulin lispro Injectable (HumaLOG) 11 Unit(s) SubCutaneous <User Schedule>  insulin lispro Injectable (HumaLOG) 15 Unit(s) SubCutaneous with breakfast  lactobacillus acidophilus 1 Tablet(s) Oral three times a day with meals  levothyroxine 137 MICROGram(s) Oral <User Schedule>  naproxen 500 milliGRAM(s) Oral two times a day  pantoprazole    Tablet 40 milliGRAM(s) Oral before breakfast  predniSONE   Tablet 10 milliGRAM(s) Oral daily  psyllium Powder 1 Packet(s) Oral daily  sildenafil (REVATIO) 20 milliGRAM(s) Oral three times a day  simvastatin 20 milliGRAM(s) Oral at bedtime  sodium chloride 0.65% Nasal 2 Spray(s) Both Nostrils four times a day  traMADol 50 milliGRAM(s) Oral two times a day    MEDICATIONS  (PRN):  acetaminophen   Tablet .. 650 milliGRAM(s) Oral every 6 hours PRN Temp greater or equal to 38C (100.4F), Moderate Pain (4 - 6)  dextrose 40% Gel 15 Gram(s) Oral once PRN Blood Glucose LESS THAN 70 milliGRAM(s)/deciLiter  glucagon  Injectable 1 milliGRAM(s) IntraMuscular once PRN Glucose <70 milliGRAM(s)/deciLiter  oxyCODONE    IR 5 milliGRAM(s) Oral every 4 hours PRN moderate and severe pain  sodium chloride 0.65% Nasal 1 Spray(s) Both Nostrils four times a day PRN Nasal Congestion      LABS:                        9.2    13.40 )-----------( 419      ( 19 Mar 2019 12:50 )             31.0     03-19    x   |  x   |  x   ----------------------------<  x   4.3   |  x   |  x     Ca    9.7      19 Mar 2019 12:39  Mg     2.2     03-19                MICROBIOLOGY:     RADIOLOGY:  [ ] Reviewed and interpreted by me    PULMONARY FUNCTION TESTS:    EKG: CHIEF COMPLAINT: Patient is a 63y old  Female who presents with a chief complaint of SBO and exlap (18 Mar 2019 15:23)    Interval Events: none overnight      REVIEW OF SYSTEMS:  Constitutional: no complaints  CV: denies  Resp: denies  GI: denies  [x] All other systems negative  [ ] Unable to assess ROS because ________      OBJECTIVE:  ICU Vital Signs Last 24 Hrs  T(C): 36.9 (20 Mar 2019 06:30), Max: 36.9 (20 Mar 2019 06:30)  T(F): 98.5 (20 Mar 2019 06:30), Max: 98.5 (20 Mar 2019 06:30)  HR: 97 (20 Mar 2019 07:14) (75 - 110)  BP: 115/71 (20 Mar 2019 06:30) (108/66 - 134/76)  BP(mean): --  ABP: --  ABP(mean): --  RR: 18 (20 Mar 2019 06:30) (15 - 18)  SpO2: 100% (20 Mar 2019 07:14) (93% - 100%)    POCT Blood Glucose.: 93 mg/dL (19 Mar 2019 21:58)    HOSPITAL MEDICATIONS:  MEDICATIONS  (STANDING):  amoxicillin  875 milliGRAM(s)/clavulanate 1 Tablet(s) Oral two times a day  ascorbic acid Syrup 1000 milliGRAM(s) Oral once  chlorhexidine 4% Liquid 1 Application(s) Topical <User Schedule>  collagenase Ointment 1 Application(s) Topical daily  dextrose 5%. 1000 milliLiter(s) (50 mL/Hr) IV Continuous <Continuous>  dextrose 50% Injectable 12.5 Gram(s) IV Push once  dextrose 50% Injectable 25 Gram(s) IV Push once  dextrose 50% Injectable 25 Gram(s) IV Push once  fentaNYL   Patch  25 MICROgram(s)/Hr 1 Patch Transdermal every 72 hours  furosemide    Tablet 40 milliGRAM(s) Oral two times a day  gabapentin 300 milliGRAM(s) Oral three times a day  guaiFENesin    Syrup 200 milliGRAM(s) Oral every 6 hours  heparin  Injectable 7500 Unit(s) SubCutaneous every 8 hours  insulin glargine Injectable (LANTUS) 28 Unit(s) SubCutaneous at bedtime  insulin lispro (HumaLOG) corrective regimen sliding scale   SubCutaneous three times a day before meals  insulin lispro (HumaLOG) corrective regimen sliding scale   SubCutaneous at bedtime  insulin lispro Injectable (HumaLOG) 11 Unit(s) SubCutaneous <User Schedule>  insulin lispro Injectable (HumaLOG) 15 Unit(s) SubCutaneous with breakfast  lactobacillus acidophilus 1 Tablet(s) Oral three times a day with meals  levothyroxine 137 MICROGram(s) Oral <User Schedule>  naproxen 500 milliGRAM(s) Oral two times a day  pantoprazole    Tablet 40 milliGRAM(s) Oral before breakfast  predniSONE   Tablet 10 milliGRAM(s) Oral daily  psyllium Powder 1 Packet(s) Oral daily  sildenafil (REVATIO) 20 milliGRAM(s) Oral three times a day  simvastatin 20 milliGRAM(s) Oral at bedtime  sodium chloride 0.65% Nasal 2 Spray(s) Both Nostrils four times a day  traMADol 50 milliGRAM(s) Oral two times a day    MEDICATIONS  (PRN):  acetaminophen   Tablet .. 650 milliGRAM(s) Oral every 6 hours PRN Temp greater or equal to 38C (100.4F), Moderate Pain (4 - 6)  dextrose 40% Gel 15 Gram(s) Oral once PRN Blood Glucose LESS THAN 70 milliGRAM(s)/deciLiter  glucagon  Injectable 1 milliGRAM(s) IntraMuscular once PRN Glucose <70 milliGRAM(s)/deciLiter  oxyCODONE    IR 5 milliGRAM(s) Oral every 4 hours PRN moderate and severe pain  sodium chloride 0.65% Nasal 1 Spray(s) Both Nostrils four times a day PRN Nasal Congestion

## 2019-03-20 NOTE — PROGRESS NOTE ADULT - PROBLEM SELECTOR PROBLEM 1
Type 2 diabetes mellitus with hypoglycemia, with long-term current use of insulin
Acute respiratory failure
SOB (shortness of breath)
Acute on chronic respiratory failure
Acute respiratory failure
SBO (small bowel obstruction)
SOB (shortness of breath)
SOB (shortness of breath)
Type 2 diabetes mellitus with hypoglycemia, with long-term current use of insulin
Acute respiratory failure
SOB (shortness of breath)
Acute respiratory failure
Acute on chronic respiratory failure
Acute respiratory failure
Acute on chronic respiratory failure
Enterococcal bacteremia

## 2019-03-20 NOTE — CHART NOTE - NSCHARTNOTESELECT_GEN_ALL_CORE
POC US Note/Event Note
Transfer Note/SICU
Bronchoscopy/Event Note
Chart Note
Consult/Nutrition Services
Event Note
Event Note/ADS NP
Event Note/Endocrine
Event Note/Endocrinology
Event Note/Ethics
Event Note/Neurology
Event Note/POC ultrasound
Event Note/POCUS
Follow  up/Nutrition Services
GOC discussion
MATEO/Event Note
MICU
MICU Accept/Event Note
MICU Transfer Note
Nutrition Follow/Up Note/Nutrition Services
Nutrition Follow/Up Note/Nutrition Services
Nutrition Services/Follow  up
Nutrition Services/Nutrition Consult/ follow/up note
POC US Note/Event Note
POC US Note/Transfer Note
POCUS/Event Note
Palliative
Post Op Note
Pulmonary Attending/Event Note
Transfer Note
Transfer Note/SICU to MICU
endocrinology/Event Note
pocus MICU
transfer note

## 2019-03-21 ENCOUNTER — INBOUND DOCUMENT (OUTPATIENT)
Age: 64
End: 2019-03-21

## 2019-03-21 ENCOUNTER — INPATIENT (INPATIENT)
Facility: HOSPITAL | Age: 64
LOS: 18 days | Discharge: NURSING, REHAB OR LONG-TERM CARE | DRG: 177 | End: 2019-04-09
Attending: INTERNAL MEDICINE | Admitting: INTERNAL MEDICINE
Payer: COMMERCIAL

## 2019-03-21 VITALS
OXYGEN SATURATION: 92 % | WEIGHT: 289.03 LBS | HEART RATE: 118 BPM | SYSTOLIC BLOOD PRESSURE: 137 MMHG | DIASTOLIC BLOOD PRESSURE: 84 MMHG | TEMPERATURE: 99 F | HEIGHT: 62 IN | RESPIRATION RATE: 18 BRPM

## 2019-03-21 DIAGNOSIS — E03.9 HYPOTHYROIDISM, UNSPECIFIED: ICD-10-CM

## 2019-03-21 DIAGNOSIS — J18.9 PNEUMONIA, UNSPECIFIED ORGANISM: ICD-10-CM

## 2019-03-21 DIAGNOSIS — Z99.81 DEPENDENCE ON SUPPLEMENTAL OXYGEN: ICD-10-CM

## 2019-03-21 DIAGNOSIS — E11.9 TYPE 2 DIABETES MELLITUS WITHOUT COMPLICATIONS: ICD-10-CM

## 2019-03-21 LAB
ALBUMIN SERPL ELPH-MCNC: 3.7 G/DL — SIGNIFICANT CHANGE UP (ref 3.3–5)
ALP SERPL-CCNC: 71 U/L — SIGNIFICANT CHANGE UP (ref 40–120)
ALT FLD-CCNC: 17 U/L — SIGNIFICANT CHANGE UP (ref 10–45)
ANION GAP SERPL CALC-SCNC: 19 MMOL/L — HIGH (ref 5–17)
APTT BLD: 22.9 SEC — LOW (ref 27.5–36.3)
AST SERPL-CCNC: 27 U/L — SIGNIFICANT CHANGE UP (ref 10–40)
BASOPHILS # BLD AUTO: 0.1 K/UL — SIGNIFICANT CHANGE UP (ref 0–0.2)
BASOPHILS NFR BLD AUTO: 0.8 % — SIGNIFICANT CHANGE UP (ref 0–2)
BILIRUB SERPL-MCNC: 0.1 MG/DL — LOW (ref 0.2–1.2)
BUN SERPL-MCNC: 41 MG/DL — HIGH (ref 7–23)
CALCIUM SERPL-MCNC: 10 MG/DL — SIGNIFICANT CHANGE UP (ref 8.4–10.5)
CHLORIDE SERPL-SCNC: 95 MMOL/L — LOW (ref 96–108)
CO2 SERPL-SCNC: 21 MMOL/L — LOW (ref 22–31)
COPPER SERPL-MCNC: 166 UG/DL — SIGNIFICANT CHANGE UP (ref 72–166)
CREAT SERPL-MCNC: 1.02 MG/DL — SIGNIFICANT CHANGE UP (ref 0.5–1.3)
EOSINOPHIL # BLD AUTO: 0.4 K/UL — SIGNIFICANT CHANGE UP (ref 0–0.5)
EOSINOPHIL NFR BLD AUTO: 2.7 % — SIGNIFICANT CHANGE UP (ref 0–6)
GLUCOSE BLDC GLUCOMTR-MCNC: 175 MG/DL — HIGH (ref 70–99)
GLUCOSE SERPL-MCNC: 143 MG/DL — HIGH (ref 70–99)
HCT VFR BLD CALC: 30.8 % — LOW (ref 34.5–45)
HGB BLD-MCNC: 9.9 G/DL — LOW (ref 11.5–15.5)
INR BLD: 0.97 RATIO — SIGNIFICANT CHANGE UP (ref 0.88–1.16)
LYMPHOCYTES # BLD AUTO: 38.5 % — SIGNIFICANT CHANGE UP (ref 13–44)
LYMPHOCYTES # BLD AUTO: 5.1 K/UL — HIGH (ref 1–3.3)
MAGNESIUM SERPL-MCNC: 2.1 MG/DL — SIGNIFICANT CHANGE UP (ref 1.6–2.6)
MCHC RBC-ENTMCNC: 29.2 PG — SIGNIFICANT CHANGE UP (ref 27–34)
MCHC RBC-ENTMCNC: 32 GM/DL — SIGNIFICANT CHANGE UP (ref 32–36)
MCV RBC AUTO: 91.2 FL — SIGNIFICANT CHANGE UP (ref 80–100)
MONOCYTES # BLD AUTO: 1 K/UL — HIGH (ref 0–0.9)
MONOCYTES NFR BLD AUTO: 7.7 % — SIGNIFICANT CHANGE UP (ref 2–14)
NEUTROPHILS # BLD AUTO: 6.7 K/UL — SIGNIFICANT CHANGE UP (ref 1.8–7.4)
NEUTROPHILS NFR BLD AUTO: 50.3 % — SIGNIFICANT CHANGE UP (ref 43–77)
NT-PROBNP SERPL-SCNC: 100 PG/ML — SIGNIFICANT CHANGE UP (ref 0–300)
PLATELET # BLD AUTO: 412 K/UL — HIGH (ref 150–400)
POTASSIUM SERPL-MCNC: 4.6 MMOL/L — SIGNIFICANT CHANGE UP (ref 3.5–5.3)
POTASSIUM SERPL-SCNC: 4.6 MMOL/L — SIGNIFICANT CHANGE UP (ref 3.5–5.3)
PROT SERPL-MCNC: 7.1 G/DL — SIGNIFICANT CHANGE UP (ref 6–8.3)
PROTHROM AB SERPL-ACNC: 11.1 SEC — SIGNIFICANT CHANGE UP (ref 10–12.9)
RAPID RVP RESULT: SIGNIFICANT CHANGE UP
RBC # BLD: 3.38 M/UL — LOW (ref 3.8–5.2)
RBC # FLD: 17.2 % — HIGH (ref 10.3–14.5)
SODIUM SERPL-SCNC: 135 MMOL/L — SIGNIFICANT CHANGE UP (ref 135–145)
TROPONIN T, HIGH SENSITIVITY RESULT: 33 NG/L — SIGNIFICANT CHANGE UP (ref 0–51)
TROPONIN T, HIGH SENSITIVITY RESULT: 34 NG/L — SIGNIFICANT CHANGE UP (ref 0–51)
WBC # BLD: 13.3 K/UL — HIGH (ref 3.8–10.5)
WBC # FLD AUTO: 13.3 K/UL — HIGH (ref 3.8–10.5)

## 2019-03-21 PROCEDURE — 93010 ELECTROCARDIOGRAM REPORT: CPT

## 2019-03-21 PROCEDURE — 71045 X-RAY EXAM CHEST 1 VIEW: CPT | Mod: 26

## 2019-03-21 PROCEDURE — 71275 CT ANGIOGRAPHY CHEST: CPT | Mod: 26

## 2019-03-21 PROCEDURE — 99285 EMERGENCY DEPT VISIT HI MDM: CPT | Mod: 25

## 2019-03-21 RX ORDER — DEXTROSE 50 % IN WATER 50 %
25 SYRINGE (ML) INTRAVENOUS ONCE
Qty: 0 | Refills: 0 | Status: DISCONTINUED | OUTPATIENT
Start: 2019-03-21 | End: 2019-04-09

## 2019-03-21 RX ORDER — FENTANYL CITRATE 50 UG/ML
1 INJECTION INTRAVENOUS
Qty: 0 | Refills: 0 | Status: DISCONTINUED | OUTPATIENT
Start: 2019-03-21 | End: 2019-03-27

## 2019-03-21 RX ORDER — ENOXAPARIN SODIUM 100 MG/ML
40 INJECTION SUBCUTANEOUS EVERY 24 HOURS
Qty: 0 | Refills: 0 | Status: DISCONTINUED | OUTPATIENT
Start: 2019-03-21 | End: 2019-04-09

## 2019-03-21 RX ORDER — VANCOMYCIN HCL 1 G
1000 VIAL (EA) INTRAVENOUS ONCE
Qty: 0 | Refills: 0 | Status: COMPLETED | OUTPATIENT
Start: 2019-03-21 | End: 2019-03-21

## 2019-03-21 RX ORDER — SIMVASTATIN 20 MG/1
20 TABLET, FILM COATED ORAL AT BEDTIME
Qty: 0 | Refills: 0 | Status: DISCONTINUED | OUTPATIENT
Start: 2019-03-21 | End: 2019-04-09

## 2019-03-21 RX ORDER — OXYCODONE HYDROCHLORIDE 5 MG/1
5 TABLET ORAL EVERY 4 HOURS
Qty: 0 | Refills: 0 | Status: DISCONTINUED | OUTPATIENT
Start: 2019-03-21 | End: 2019-03-23

## 2019-03-21 RX ORDER — INSULIN LISPRO 100/ML
VIAL (ML) SUBCUTANEOUS
Qty: 0 | Refills: 0 | Status: DISCONTINUED | OUTPATIENT
Start: 2019-03-21 | End: 2019-04-09

## 2019-03-21 RX ORDER — PRIMIDONE 250 MG/1
25 TABLET ORAL DAILY
Qty: 0 | Refills: 0 | Status: DISCONTINUED | OUTPATIENT
Start: 2019-03-21 | End: 2019-03-29

## 2019-03-21 RX ORDER — VANCOMYCIN HCL 1 G
1000 VIAL (EA) INTRAVENOUS EVERY 12 HOURS
Qty: 0 | Refills: 0 | Status: DISCONTINUED | OUTPATIENT
Start: 2019-03-21 | End: 2019-03-23

## 2019-03-21 RX ORDER — PANTOPRAZOLE SODIUM 20 MG/1
40 TABLET, DELAYED RELEASE ORAL
Qty: 0 | Refills: 0 | Status: DISCONTINUED | OUTPATIENT
Start: 2019-03-21 | End: 2019-03-29

## 2019-03-21 RX ORDER — SODIUM CHLORIDE 9 MG/ML
1000 INJECTION, SOLUTION INTRAVENOUS
Qty: 0 | Refills: 0 | Status: DISCONTINUED | OUTPATIENT
Start: 2019-03-21 | End: 2019-04-09

## 2019-03-21 RX ORDER — TRAMADOL HYDROCHLORIDE 50 MG/1
50 TABLET ORAL
Qty: 0 | Refills: 0 | Status: DISCONTINUED | OUTPATIENT
Start: 2019-03-21 | End: 2019-03-28

## 2019-03-21 RX ORDER — LEVOTHYROXINE SODIUM 125 MCG
137 TABLET ORAL DAILY
Qty: 0 | Refills: 0 | Status: DISCONTINUED | OUTPATIENT
Start: 2019-03-21 | End: 2019-04-09

## 2019-03-21 RX ORDER — ONDANSETRON 8 MG/1
4 TABLET, FILM COATED ORAL EVERY 6 HOURS
Qty: 0 | Refills: 0 | Status: DISCONTINUED | OUTPATIENT
Start: 2019-03-21 | End: 2019-04-09

## 2019-03-21 RX ORDER — OXYCODONE HYDROCHLORIDE 5 MG/1
5 TABLET ORAL ONCE
Qty: 0 | Refills: 0 | Status: DISCONTINUED | OUTPATIENT
Start: 2019-03-21 | End: 2019-03-21

## 2019-03-21 RX ORDER — DEXTROSE 50 % IN WATER 50 %
12.5 SYRINGE (ML) INTRAVENOUS ONCE
Qty: 0 | Refills: 0 | Status: DISCONTINUED | OUTPATIENT
Start: 2019-03-21 | End: 2019-04-09

## 2019-03-21 RX ORDER — MEROPENEM 1 G/30ML
1000 INJECTION INTRAVENOUS EVERY 8 HOURS
Qty: 0 | Refills: 0 | Status: DISCONTINUED | OUTPATIENT
Start: 2019-03-21 | End: 2019-03-27

## 2019-03-21 RX ORDER — IPRATROPIUM/ALBUTEROL SULFATE 18-103MCG
3 AEROSOL WITH ADAPTER (GRAM) INHALATION EVERY 6 HOURS
Qty: 0 | Refills: 0 | Status: DISCONTINUED | OUTPATIENT
Start: 2019-03-21 | End: 2019-04-09

## 2019-03-21 RX ORDER — INSULIN LISPRO 100/ML
10 VIAL (ML) SUBCUTANEOUS
Qty: 0 | Refills: 0 | Status: DISCONTINUED | OUTPATIENT
Start: 2019-03-21 | End: 2019-04-09

## 2019-03-21 RX ORDER — INSULIN GLARGINE 100 [IU]/ML
20 INJECTION, SOLUTION SUBCUTANEOUS AT BEDTIME
Qty: 0 | Refills: 0 | Status: DISCONTINUED | OUTPATIENT
Start: 2019-03-21 | End: 2019-04-09

## 2019-03-21 RX ORDER — ACETAMINOPHEN 500 MG
650 TABLET ORAL ONCE
Qty: 0 | Refills: 0 | Status: COMPLETED | OUTPATIENT
Start: 2019-03-21 | End: 2019-03-21

## 2019-03-21 RX ORDER — CEFEPIME 1 G/1
1000 INJECTION, POWDER, FOR SOLUTION INTRAMUSCULAR; INTRAVENOUS ONCE
Qty: 0 | Refills: 0 | Status: COMPLETED | OUTPATIENT
Start: 2019-03-21 | End: 2019-03-21

## 2019-03-21 RX ORDER — FUROSEMIDE 40 MG
40 TABLET ORAL
Qty: 0 | Refills: 0 | Status: DISCONTINUED | OUTPATIENT
Start: 2019-03-21 | End: 2019-04-09

## 2019-03-21 RX ORDER — GLUCAGON INJECTION, SOLUTION 0.5 MG/.1ML
1 INJECTION, SOLUTION SUBCUTANEOUS ONCE
Qty: 0 | Refills: 0 | Status: DISCONTINUED | OUTPATIENT
Start: 2019-03-21 | End: 2019-04-09

## 2019-03-21 RX ORDER — DEXTROSE 50 % IN WATER 50 %
15 SYRINGE (ML) INTRAVENOUS ONCE
Qty: 0 | Refills: 0 | Status: DISCONTINUED | OUTPATIENT
Start: 2019-03-21 | End: 2019-04-09

## 2019-03-21 RX ORDER — GABAPENTIN 400 MG/1
300 CAPSULE ORAL THREE TIMES A DAY
Qty: 0 | Refills: 0 | Status: DISCONTINUED | OUTPATIENT
Start: 2019-03-21 | End: 2019-04-03

## 2019-03-21 RX ADMIN — OXYCODONE HYDROCHLORIDE 5 MILLIGRAM(S): 5 TABLET ORAL at 22:41

## 2019-03-21 RX ADMIN — CEFEPIME 100 MILLIGRAM(S): 1 INJECTION, POWDER, FOR SOLUTION INTRAMUSCULAR; INTRAVENOUS at 19:50

## 2019-03-21 RX ADMIN — INSULIN GLARGINE 20 UNIT(S): 100 INJECTION, SOLUTION SUBCUTANEOUS at 22:42

## 2019-03-21 RX ADMIN — Medication 650 MILLIGRAM(S): at 19:48

## 2019-03-21 RX ADMIN — TRAMADOL HYDROCHLORIDE 50 MILLIGRAM(S): 50 TABLET ORAL at 22:27

## 2019-03-21 RX ADMIN — GABAPENTIN 300 MILLIGRAM(S): 400 CAPSULE ORAL at 22:41

## 2019-03-21 RX ADMIN — Medication 20 MILLIGRAM(S): at 22:43

## 2019-03-21 RX ADMIN — Medication 650 MILLIGRAM(S): at 19:09

## 2019-03-21 RX ADMIN — OXYCODONE HYDROCHLORIDE 5 MILLIGRAM(S): 5 TABLET ORAL at 22:28

## 2019-03-21 RX ADMIN — Medication 250 MILLIGRAM(S): at 20:26

## 2019-03-21 RX ADMIN — TRAMADOL HYDROCHLORIDE 50 MILLIGRAM(S): 50 TABLET ORAL at 20:24

## 2019-03-21 RX ADMIN — OXYCODONE HYDROCHLORIDE 5 MILLIGRAM(S): 5 TABLET ORAL at 20:24

## 2019-03-21 NOTE — ED PROVIDER NOTE - PROGRESS NOTE DETAILS
Guszack: CTA negative for PE, shows resolving infiltrates from prior study. First troponin indeterminate, repeat pending. Feeling better on nasal cannula. Social work contacted to ensure pt has CPAP at nursing facility for tentative discharge Robbin: Per social work, pt has bipap at the NH and was on it last night. NH has no objections to dc. Pending repeat troponin for dispo Bianca Richard DO: Patient signed out to me by Dr. Siegel pending repeat troponin. Patient on baseline saturation. No clinical concern for pna in setting of new patchy opacification. Bianca Richadr, DO: Patient signed out to me by Dr. Siegel pending repeat troponin. Patient on baseline saturation. Per primary team during sign out, no clinical concern for pna in setting of new patchy opacification. Bianca Richard, DO: Patient spiked temperature upon arrival of EMS and tachycardic to 110s. Will treat with tylenol, antibiose and admit for suspect HAP. Bianca Richard, DO: Patient spiked temperature upon arrival of EMS and tachycardic to 110s. Will treat with tylenol, antibiose and admit for suspected HAP. Zulema Haley MD - Attending Physician: Transport here to take patient to [facility]. Pt now tachycardic, febrile. Re-assessed and reviewed results. New UL infiltrates, recent admit for resp failure/intubation. Will treat for HCAP, requires re-admission as cannot do in [facility]. Cancelled transport.

## 2019-03-21 NOTE — ED ADULT NURSE NOTE - OBJECTIVE STATEMENT
64 y/o Female presenting to the ED by EMS from [facility], [facility], complaining of shortness of breath and chest tightness. Pt was transferred to this [facility] yesterday and reports they did not have her CPAP machine so she slept without it, when she awoke she had shortness of breath. Pt arrived on 4L of oxygen sating 100% at a respiratory rate of 23. Pt has an abdominal wound from a recent hospital stay in the MICU for a small bowel obstruction. Wound appears pink. Covered and dressed after assessment. Skin warm dry and intact, patient does not feel hot to touch. Pt afebrile at this time. Left leg appears swollen, pt does not know if this is her baseline. Pt reports pain in her leg and abdomen at a rate of 8/10 at this time. MD aware. Pt on cardiac monitor showing sinus tachycardia at 111. EKG preformed and in chart. Pt denies N/V/D, numbness, tingling, dizziness, headache, constipation, fever, chills, weakness. Abdomen nondistended. Safety and comfort measures provided. Call bell within reach. Awaiting CT scan. 62 y/o Female presenting to the ED by EMS from [facility], [facility], complaining of shortness of breath and chest tightness. Pt was transferred to this [facility] yesterday and reports they did not have her CPAP machine so she slept without it, when she awoke she had shortness of breath. Pt arrived on 4L of oxygen sating 100% at a respiratory rate of 23. Pt has an abdominal wound from a recent hospital stay in the MICU for a small bowel obstruction. Wound appears pink. Covered and dressed after assessment. Skin warm dry and intact, patient does not feel hot to touch. Pt afebrile at this time. Left leg appears swollen, pt does not know if this is her baseline. Pt reports pain in her leg and abdomen at a rate of 8/10 at this time. MD aware. Pt on cardiac monitor showing sinus tachycardia at 111. EKG preformed and in chart. Pt denies N/V/D, numbness, tingling, dizziness, headache, constipation, fever, chills, weakness. Abdomen nondistended. Lung sounds diminished on the left side, inspiratory crackles in the right lobe. Fentanyl patch on the left chest wall noted from prior to arrival. Safety and comfort measures provided. Call bell within reach. Awaiting CT scan.

## 2019-03-21 NOTE — H&P ADULT - NSHPPHYSICALEXAM_GEN_ALL_CORE
General: WN/WD NAD  PERRLA  Neurology: A&Ox3, nonfocal, BOWER x 4  Respiratory:  B/L rhonchipresent  CV: RRR, S1S2, no murmurs, rubs or gallops  Abdominal: Soft, NT, ND +BS, Last BM  Extremities: No edema, + peripheral pulses  Skin Normal

## 2019-03-21 NOTE — ED PROVIDER NOTE - NSFOLLOWUPINSTRUCTIONS_ED_ALL_ED_FT
1. Please follow up with your doctor and surgeon as scheduled.  2. Use your CPAP at night as scheduled.   3. Please return to the ED should you have any new or worsening symptoms or have any new concerns.   4. Read all attached.

## 2019-03-21 NOTE — ED ADULT NURSE REASSESSMENT NOTE - NS ED NURSE REASSESS COMMENT FT1
Pt reports her IV is bothering her upon return from CT scan. Upon assessment by RN, IV appears red and swollen. IV removed. CT states patient had no complaints following CT scan. Safety and comfort measures provided.

## 2019-03-21 NOTE — ED PROVIDER NOTE - SHIFT CHANGE DETAILS
Zulema Haley MD - Attending Physician: Received signout from Dr Summers. Pt here with SOB/CP though sat at baseline on home oxygen, trops neg, awaiting dc with ambulette

## 2019-03-21 NOTE — ED PROVIDER NOTE - ATTENDING CONTRIBUTION TO CARE
62 y/o female with the above documented history and HPI who on exam appears chronically unwell but comfortable on her baseline 4L supplemental O2 via NC. VSs noted, neck supple, lungs CTA, cardiac sounds s/ audible m/r/g, abdomen soft, NT/ND c/ granulating wound, extremities s/ asymmetry, calf ttp or palpable cord, skin s/ rash or edema and neurologically intact. Patient's history more concerning at this point than her clinical appearance. In light of it as well as her presenting complaints however, we will proceed with a full investigation, the results of which will dictate her ultimate treatment and disposition.

## 2019-03-21 NOTE — ED ADULT NURSE REASSESSMENT NOTE - NS ED NURSE REASSESS COMMENT FT1
Transport arrived to transport patient back to nursing home facility. Patients vital signs taken prior to transport, patient noted to have temperature of 99.6F with heart rate in 110s. MD Haley made aware, patient NOT to be transported back to nursing facility. Tylenol to be given per order. Pt. aware of plan of care.

## 2019-03-21 NOTE — CHART NOTE - NSCHARTNOTEFT_GEN_A_CORE
EMERGENCY ROOM SOCIAL WORK: Chart reviewed due to readmission. Patient is a 64 y/o, female, with PMH of CVA, Rheumatoid Arthritis, chronic Asthma, HTN, presents to the ED from [facility] c/o shortness of breath and chest pain. Patient recently discharged from [facility] on 3/20 to [facility]. Per treating MD EMERGENCY ROOM SOCIAL WORK: Chart reviewed due to readmission. Patient is a 62 y/o, female, with PMH of CVA, Rheumatoid Arthritis, chronic Asthma, HTN, presents to the ED from [facility] c/o shortness of breath and chest pain. Patient recently discharged from [facility] on 3/20 to [facility]. Patient reports nursing home did not have BiPAP machine on arrival. LMSW contacted [facility] and spoke to RN Supervisor Paola for collateral information. RN Supervisor Paola confirmed patient was received at [facility] yesterday 3/20 from [facility]. Per RN Supervisor Paola, patient was placed on BiPAP overnight and transitioned to 4L NC in the AM. RN Supervisor Paola reports in the AM, patient stated she was not feeling well and requested to be transported to [facility]. Per RN, patient can return to facility once medically cleared. Disposition pending medical course.

## 2019-03-21 NOTE — ED ADULT NURSE NOTE - NSIMPLEMENTINTERV_GEN_ALL_ED
Implemented All Fall Risk Interventions:  [place] to call system. Call bell, personal items and telephone within reach. Instruct patient to call for assistance. Room bathroom lighting operational. Non-slip footwear when patient is off stretcher. Physically safe environment: no spills, clutter or unnecessary equipment. Stretcher in lowest position, wheels locked, appropriate side rails in place. Provide visual cue, wrist band, yellow gown, etc. Monitor gait and stability. Monitor for mental status changes and reorient to person, place, and time. Review medications for side effects contributing to fall risk. Reinforce activity limits and safety measures with patient and family.

## 2019-03-21 NOTE — H&P ADULT - NSHPLABSRESULTS_GEN_ALL_CORE
Lab Results:  CBC  CBC Full  -  ( 21 Mar 2019 10:41 )  WBC Count : 13.3 K/uL  Hemoglobin : 9.9 g/dL  Hematocrit : 30.8 %  Platelet Count - Automated : 412 K/uL  Mean Cell Volume : 91.2 fl  Mean Cell Hemoglobin : 29.2 pg  Mean Cell Hemoglobin Concentration : 32.0 gm/dL  Auto Neutrophil # : 6.7 K/uL  Auto Lymphocyte # : 5.1 K/uL  Auto Monocyte # : 1.0 K/uL  Auto Eosinophil # : 0.4 K/uL  Auto Basophil # : 0.1 K/uL  Auto Neutrophil % : 50.3 %  Auto Lymphocyte % : 38.5 %  Auto Monocyte % : 7.7 %  Auto Eosinophil % : 2.7 %  Auto Basophil % : 0.8 %    .		Differential:	[] Automated		[] Manual  Chemistry                        9.9    13.3  )-----------( 412      ( 21 Mar 2019 10:41 )             30.8     03-21    135  |  95<L>  |  41<H>  ----------------------------<  143<H>  4.6   |  21<L>  |  1.02    Ca    10.0      21 Mar 2019 10:41  Mg     2.1     03-21    TPro  7.1  /  Alb  3.7  /  TBili  0.1<L>  /  DBili  x   /  AST  27  /  ALT  17  /  AlkPhos  71  03-21    LIVER FUNCTIONS - ( 21 Mar 2019 10:41 )  Alb: 3.7 g/dL / Pro: 7.1 g/dL / ALK PHOS: 71 U/L / ALT: 17 U/L / AST: 27 U/L / GGT: x           PT/INR - ( 21 Mar 2019 10:41 )   PT: 11.1 sec;   INR: 0.97 ratio         PTT - ( 21 Mar 2019 10:41 )  PTT:22.9 sec          MICROBIOLOGY/CULTURES:      RADIOLOGY RESULTS: reviewed

## 2019-03-21 NOTE — ED PROVIDER NOTE - OBJECTIVE STATEMENT
63yof 63yof w/ HTN, DM2, COPD/CHRIS on nocturnal CPAP, discharged yesterday from [facility] after a prolonged hospitalization for SBO requiring lysis of adhesions, complicated by hypercapneic respiratory failure requiring intubation. This morning pt awoke with shortness of breath and chest tightness, was supposed to use CPAP last night but nursing facility did not have the machine. Pt oxygen dependent on 4L at baseline. Feeling better now with supplemental oxygen. No fevers or chills. Occasional cough.

## 2019-03-21 NOTE — ED ADULT NURSE REASSESSMENT NOTE - NS ED NURSE REASSESS COMMENT FT1
RN removed RAC IVL after CT scan w/ angio due to site swelling, redness, coolness. Applied warm compress and elevated extremity.

## 2019-03-21 NOTE — H&P ADULT - NSICDXPASTMEDICALHX_GEN_ALL_CORE_FT
PAST MEDICAL HISTORY:  Adult Hypothyroidism     Arthritis     Borderline Diabetes Mellitus     Chronic Asthma     Cigarette Smoker     CVA (Cerebral Vascular Accident)     Diverticulosis     H/O: HTN     High Cholesterol     RA (Rheumatoid Arthritis)     Tooth Abscess

## 2019-03-21 NOTE — ED PROVIDER NOTE - CARE PLAN
Principal Discharge DX:	COPD (chronic obstructive pulmonary disease)  Secondary Diagnosis:	Oxygen dependent Principal Discharge DX:	HAP (hospital-acquired pneumonia)  Secondary Diagnosis:	Oxygen dependent

## 2019-03-21 NOTE — H&P ADULT - ASSESSMENT
63yof w/ HTN, DM2, COPD/CHRIS on nocturnal CPAP, discharged yesterday from [facility] after a prolonged hospitalization for SBO requiring lysis of adhesions, complicated by hypercapneic respiratory failure requiring intubation. This morning pt awoke with shortness of breath and chest tightness, was supposed to use CPAP last night but nursing facility did not have the machine. Pt oxygen dependent on 4L at baseline. Feeling better now with supplemental oxygen. No fevers or chills. Occasional cough.

## 2019-03-21 NOTE — ED ADULT NURSE REASSESSMENT NOTE - NS ED NURSE REASSESS COMMENT FT1
Pt complaining of 10/10 pain. MD made aware. O2 flowing at 4L. Safety and comfort measures provided.

## 2019-03-22 DIAGNOSIS — J44.9 CHRONIC OBSTRUCTIVE PULMONARY DISEASE, UNSPECIFIED: ICD-10-CM

## 2019-03-22 DIAGNOSIS — J96.92 RESPIRATORY FAILURE, UNSPECIFIED WITH HYPERCAPNIA: ICD-10-CM

## 2019-03-22 DIAGNOSIS — Z71.89 OTHER SPECIFIED COUNSELING: ICD-10-CM

## 2019-03-22 LAB
ALBUMIN SERPL ELPH-MCNC: 3.6 G/DL — SIGNIFICANT CHANGE UP (ref 3.3–5)
ALP SERPL-CCNC: 68 U/L — SIGNIFICANT CHANGE UP (ref 40–120)
ALT FLD-CCNC: 14 U/L — SIGNIFICANT CHANGE UP (ref 10–45)
ANION GAP SERPL CALC-SCNC: 16 MMOL/L — SIGNIFICANT CHANGE UP (ref 5–17)
AST SERPL-CCNC: 16 U/L — SIGNIFICANT CHANGE UP (ref 10–40)
BASE EXCESS BLDV CALC-SCNC: -1 MMOL/L — SIGNIFICANT CHANGE UP (ref -2–2)
BILIRUB SERPL-MCNC: 0.1 MG/DL — LOW (ref 0.2–1.2)
BUN SERPL-MCNC: 36 MG/DL — HIGH (ref 7–23)
CALCIUM SERPL-MCNC: 9.6 MG/DL — SIGNIFICANT CHANGE UP (ref 8.4–10.5)
CHLORIDE SERPL-SCNC: 95 MMOL/L — LOW (ref 96–108)
CO2 BLDV-SCNC: 26 MMOL/L — SIGNIFICANT CHANGE UP (ref 22–30)
CO2 SERPL-SCNC: 23 MMOL/L — SIGNIFICANT CHANGE UP (ref 22–31)
CREAT SERPL-MCNC: 1 MG/DL — SIGNIFICANT CHANGE UP (ref 0.5–1.3)
GAS PNL BLDV: SIGNIFICANT CHANGE UP
GLUCOSE BLDC GLUCOMTR-MCNC: 147 MG/DL — HIGH (ref 70–99)
GLUCOSE BLDC GLUCOMTR-MCNC: 170 MG/DL — HIGH (ref 70–99)
GLUCOSE BLDC GLUCOMTR-MCNC: 176 MG/DL — HIGH (ref 70–99)
GLUCOSE BLDC GLUCOMTR-MCNC: 183 MG/DL — HIGH (ref 70–99)
GLUCOSE SERPL-MCNC: 209 MG/DL — HIGH (ref 70–99)
HBA1C BLD-MCNC: 6.9 % — HIGH (ref 4–5.6)
HCO3 BLDV-SCNC: 24 MMOL/L — SIGNIFICANT CHANGE UP (ref 21–29)
HCT VFR BLD CALC: 32.4 % — LOW (ref 34.5–45)
HGB BLD-MCNC: 9.8 G/DL — LOW (ref 11.5–15.5)
MCHC RBC-ENTMCNC: 28 PG — SIGNIFICANT CHANGE UP (ref 27–34)
MCHC RBC-ENTMCNC: 30.4 GM/DL — LOW (ref 32–36)
MCV RBC AUTO: 92.1 FL — SIGNIFICANT CHANGE UP (ref 80–100)
PCO2 BLDV: 46 MMHG — SIGNIFICANT CHANGE UP (ref 35–50)
PH BLDV: 7.34 — LOW (ref 7.35–7.45)
PLATELET # BLD AUTO: 409 K/UL — HIGH (ref 150–400)
PO2 BLDV: 36 MMHG — SIGNIFICANT CHANGE UP (ref 25–45)
POTASSIUM SERPL-MCNC: 4 MMOL/L — SIGNIFICANT CHANGE UP (ref 3.5–5.3)
POTASSIUM SERPL-SCNC: 4 MMOL/L — SIGNIFICANT CHANGE UP (ref 3.5–5.3)
PROT SERPL-MCNC: 6.8 G/DL — SIGNIFICANT CHANGE UP (ref 6–8.3)
RBC # BLD: 3.52 M/UL — LOW (ref 3.8–5.2)
RBC # FLD: 17.5 % — HIGH (ref 10.3–14.5)
SAO2 % BLDV: 58 % — LOW (ref 67–88)
SODIUM SERPL-SCNC: 134 MMOL/L — LOW (ref 135–145)
WBC # BLD: 15.3 K/UL — HIGH (ref 3.8–10.5)
WBC # FLD AUTO: 15.3 K/UL — HIGH (ref 3.8–10.5)

## 2019-03-22 PROCEDURE — 99222 1ST HOSP IP/OBS MODERATE 55: CPT

## 2019-03-22 RX ORDER — INFLUENZA VIRUS VACCINE 15; 15; 15; 15 UG/.5ML; UG/.5ML; UG/.5ML; UG/.5ML
0.5 SUSPENSION INTRAMUSCULAR ONCE
Qty: 0 | Refills: 0 | Status: DISCONTINUED | OUTPATIENT
Start: 2019-03-22 | End: 2019-04-09

## 2019-03-22 RX ORDER — SODIUM CHLORIDE 0.65 %
1 AEROSOL, SPRAY (ML) NASAL EVERY 4 HOURS
Qty: 0 | Refills: 0 | Status: DISCONTINUED | OUTPATIENT
Start: 2019-03-22 | End: 2019-04-09

## 2019-03-22 RX ADMIN — Medication 1: at 17:32

## 2019-03-22 RX ADMIN — OXYCODONE HYDROCHLORIDE 5 MILLIGRAM(S): 5 TABLET ORAL at 08:00

## 2019-03-22 RX ADMIN — Medication 3 MILLILITER(S): at 21:29

## 2019-03-22 RX ADMIN — GABAPENTIN 300 MILLIGRAM(S): 400 CAPSULE ORAL at 14:17

## 2019-03-22 RX ADMIN — OXYCODONE HYDROCHLORIDE 5 MILLIGRAM(S): 5 TABLET ORAL at 12:43

## 2019-03-22 RX ADMIN — PRIMIDONE 25 MILLIGRAM(S): 250 TABLET ORAL at 12:40

## 2019-03-22 RX ADMIN — GABAPENTIN 300 MILLIGRAM(S): 400 CAPSULE ORAL at 21:37

## 2019-03-22 RX ADMIN — TRAMADOL HYDROCHLORIDE 50 MILLIGRAM(S): 50 TABLET ORAL at 06:56

## 2019-03-22 RX ADMIN — MEROPENEM 100 MILLIGRAM(S): 1 INJECTION INTRAVENOUS at 17:08

## 2019-03-22 RX ADMIN — INSULIN GLARGINE 20 UNIT(S): 100 INJECTION, SOLUTION SUBCUTANEOUS at 21:29

## 2019-03-22 RX ADMIN — Medication 10 MILLIGRAM(S): at 06:56

## 2019-03-22 RX ADMIN — Medication 3 MILLILITER(S): at 00:15

## 2019-03-22 RX ADMIN — SIMVASTATIN 20 MILLIGRAM(S): 20 TABLET, FILM COATED ORAL at 21:29

## 2019-03-22 RX ADMIN — Medication 10 UNIT(S): at 17:33

## 2019-03-22 RX ADMIN — Medication 10 UNIT(S): at 08:55

## 2019-03-22 RX ADMIN — Medication 3 MILLILITER(S): at 06:56

## 2019-03-22 RX ADMIN — OXYCODONE HYDROCHLORIDE 5 MILLIGRAM(S): 5 TABLET ORAL at 22:30

## 2019-03-22 RX ADMIN — Medication 40 MILLIGRAM(S): at 17:11

## 2019-03-22 RX ADMIN — Medication 3 MILLILITER(S): at 17:11

## 2019-03-22 RX ADMIN — Medication 20 MILLIGRAM(S): at 14:17

## 2019-03-22 RX ADMIN — OXYCODONE HYDROCHLORIDE 5 MILLIGRAM(S): 5 TABLET ORAL at 13:30

## 2019-03-22 RX ADMIN — Medication 1 SPRAY(S): at 19:12

## 2019-03-22 RX ADMIN — Medication 137 MICROGRAM(S): at 06:56

## 2019-03-22 RX ADMIN — Medication 10 UNIT(S): at 12:43

## 2019-03-22 RX ADMIN — Medication 250 MILLIGRAM(S): at 06:56

## 2019-03-22 RX ADMIN — MEROPENEM 100 MILLIGRAM(S): 1 INJECTION INTRAVENOUS at 01:31

## 2019-03-22 RX ADMIN — GABAPENTIN 300 MILLIGRAM(S): 400 CAPSULE ORAL at 06:56

## 2019-03-22 RX ADMIN — MEROPENEM 100 MILLIGRAM(S): 1 INJECTION INTRAVENOUS at 09:40

## 2019-03-22 RX ADMIN — PANTOPRAZOLE SODIUM 40 MILLIGRAM(S): 20 TABLET, DELAYED RELEASE ORAL at 06:56

## 2019-03-22 RX ADMIN — Medication 250 MILLIGRAM(S): at 18:30

## 2019-03-22 RX ADMIN — Medication 20 MILLIGRAM(S): at 06:56

## 2019-03-22 RX ADMIN — OXYCODONE HYDROCHLORIDE 5 MILLIGRAM(S): 5 TABLET ORAL at 18:26

## 2019-03-22 RX ADMIN — OXYCODONE HYDROCHLORIDE 5 MILLIGRAM(S): 5 TABLET ORAL at 21:29

## 2019-03-22 RX ADMIN — FENTANYL CITRATE 1 PATCH: 50 INJECTION INTRAVENOUS at 00:14

## 2019-03-22 RX ADMIN — OXYCODONE HYDROCHLORIDE 5 MILLIGRAM(S): 5 TABLET ORAL at 07:05

## 2019-03-22 RX ADMIN — Medication 3 MILLILITER(S): at 12:40

## 2019-03-22 RX ADMIN — TRAMADOL HYDROCHLORIDE 50 MILLIGRAM(S): 50 TABLET ORAL at 19:13

## 2019-03-22 RX ADMIN — ENOXAPARIN SODIUM 40 MILLIGRAM(S): 100 INJECTION SUBCUTANEOUS at 14:19

## 2019-03-22 RX ADMIN — FENTANYL CITRATE 1 PATCH: 50 INJECTION INTRAVENOUS at 09:39

## 2019-03-22 RX ADMIN — Medication 1: at 12:43

## 2019-03-22 RX ADMIN — Medication 1: at 08:54

## 2019-03-22 RX ADMIN — FENTANYL CITRATE 1 PATCH: 50 INJECTION INTRAVENOUS at 19:00

## 2019-03-22 RX ADMIN — OXYCODONE HYDROCHLORIDE 5 MILLIGRAM(S): 5 TABLET ORAL at 19:20

## 2019-03-22 RX ADMIN — Medication 40 MILLIGRAM(S): at 06:56

## 2019-03-22 NOTE — PATIENT PROFILE ADULT - NSPROHMDIABETMGMTSTRAT_GEN_A_NUR
activity/blood glucose testing/coping strategies/insulin therapy/weight management/diet modification/routine screenings/adequate rest/exercise/medication therapy

## 2019-03-22 NOTE — PROGRESS NOTE ADULT - SUBJECTIVE AND OBJECTIVE BOX
Patient is a 63y old  Female who presents with a chief complaint of sob (22 Mar 2019 14:25)      INTERVAL HPI/OVERNIGHT EVENTS:  T(C): 36.7 (03-22-19 @ 08:24), Max: 37.6 (03-21-19 @ 18:26)  HR: 92 (03-22-19 @ 15:06) (92 - 118)  BP: 106/63 (03-22-19 @ 13:59) (95/62 - 144/74)  RR: 20 (03-22-19 @ 08:24) (19 - 24)  SpO2: 98% (03-22-19 @ 15:06) (87% - 100%)  Wt(kg): --  I&O's Summary    21 Mar 2019 07:01  -  22 Mar 2019 07:00  --------------------------------------------------------  IN: 0 mL / OUT: 300 mL / NET: -300 mL    22 Mar 2019 07:01  -  22 Mar 2019 15:15  --------------------------------------------------------  IN: 750 mL / OUT: 250 mL / NET: 500 mL        LABS:                        9.8    15.3  )-----------( 409      ( 22 Mar 2019 10:20 )             32.4     03-22    134<L>  |  95<L>  |  36<H>  ----------------------------<  209<H>  4.0   |  23  |  1.00    Ca    9.6      22 Mar 2019 10:20  Mg     2.1     03-21    TPro  6.8  /  Alb  3.6  /  TBili  0.1<L>  /  DBili  x   /  AST  16  /  ALT  14  /  AlkPhos  68  03-22    PT/INR - ( 21 Mar 2019 10:41 )   PT: 11.1 sec;   INR: 0.97 ratio         PTT - ( 21 Mar 2019 10:41 )  PTT:22.9 sec    CAPILLARY BLOOD GLUCOSE      POCT Blood Glucose.: 183 mg/dL (22 Mar 2019 12:15)  POCT Blood Glucose.: 176 mg/dL (22 Mar 2019 08:34)  POCT Blood Glucose.: 175 mg/dL (21 Mar 2019 22:35)            MEDICATIONS  (STANDING):  ALBUTerol/ipratropium for Nebulization 3 milliLiter(s) Nebulizer every 6 hours  dextrose 5%. 1000 milliLiter(s) (50 mL/Hr) IV Continuous <Continuous>  dextrose 50% Injectable 12.5 Gram(s) IV Push once  dextrose 50% Injectable 25 Gram(s) IV Push once  dextrose 50% Injectable 25 Gram(s) IV Push once  enoxaparin Injectable 40 milliGRAM(s) SubCutaneous every 24 hours  fentaNYL   Patch  25 MICROgram(s)/Hr 1 Patch Transdermal every 72 hours  furosemide    Tablet 40 milliGRAM(s) Oral two times a day  gabapentin 300 milliGRAM(s) Oral three times a day  influenza   Vaccine 0.5 milliLiter(s) IntraMuscular once  insulin glargine Injectable (LANTUS) 20 Unit(s) SubCutaneous at bedtime  insulin lispro (HumaLOG) corrective regimen sliding scale   SubCutaneous three times a day before meals  insulin lispro Injectable (HumaLOG) 10 Unit(s) SubCutaneous three times a day before meals  levothyroxine 137 MICROGram(s) Oral daily  meropenem  IVPB 1000 milliGRAM(s) IV Intermittent every 8 hours  pantoprazole    Tablet 40 milliGRAM(s) Oral before breakfast  predniSONE   Tablet 10 milliGRAM(s) Oral daily  primidone 25 milliGRAM(s) Oral daily  sildenafil (REVATIO) 20 milliGRAM(s) Oral three times a day  simvastatin 20 milliGRAM(s) Oral at bedtime  traMADol 50 milliGRAM(s) Oral two times a day  vancomycin  IVPB 1000 milliGRAM(s) IV Intermittent every 12 hours    MEDICATIONS  (PRN):  dextrose 40% Gel 15 Gram(s) Oral once PRN Blood Glucose LESS THAN 70 milliGRAM(s)/deciliter  glucagon  Injectable 1 milliGRAM(s) IntraMuscular once PRN Glucose LESS THAN 70 milligrams/deciliter  ondansetron Injectable 4 milliGRAM(s) IV Push every 6 hours PRN Nausea  oxyCODONE    IR 5 milliGRAM(s) Oral every 4 hours PRN moderate and severe pain          PHYSICAL EXAM:  GENERAL: NAD, well-groomed, well-developed  HEAD:  Atraumatic, Normocephalic  CHEST/LUNG: Clear to percussion bilaterally; No rales, rhonchi, wheezing, or rubs  HEART: Regular rate and rhythm; No murmurs, rubs, or gallops  ABDOMEN: Soft, Nontender, Nondistended; Bowel sounds present  EXTREMITIES:  2+ Peripheral Pulses, No clubbing, cyanosis, or edema  LYMPH: No lymphadenopathy noted  SKIN: No rashes or lesions    Care Discussed with Consultants/Other Providers [ ] YES  [ ] NO

## 2019-03-22 NOTE — CONSULT NOTE ADULT - SUBJECTIVE AND OBJECTIVE BOX
HPI:  63 year old female with HTN, DM2, COPD/CHRIS on nocturnal CPAP, discharged yesterday from [facility] after a prolonged hospitalization for SBO requiring lysis of adhesions, complicated by hypercapneic respiratory failure requiring intubation. This morning pt awoke with shortness of breath and chest tightness, was supposed to use CPAP last night but nursing facility did not have the machine. Pt oxygen dependent on 4L at baseline. Feeling better now with supplemental oxygen. No fevers or chills. Occasional cough. Has remained afebrile, leukocytosis, tachycardic. RVP negative. CTA chest with no pulmonary embolism, but with patchy opacities/ consolidations within both lower lobes and the RML which have decreased compared to prior exam. Residual patchy opacities are still noted. New patchy groundglass opacities are noted within both upper lobes.     At [facility], she was admitted for SBO and underwent an exlap 12/15 closed with retention sutures, eviscerated on  and closed with additional retention sutures, course complicated with multifocal PNA, developed hypercapnic respiratory failure needing intubated , then bronched , then found to have enterococcal bacteremia which cleared on vancomycin.     PAST MEDICAL & SURGICAL HISTORY:  Diverticulosis  CVA (Cerebral Vascular Accident)  RA (Rheumatoid Arthritis)  Tooth Abscess  Arthritis  Cigarette Smoker  Chronic Asthma  Adult Hypothyroidism  Borderline Diabetes Mellitus  High Cholesterol  H/O: HTN  Wrist Disorder: S/P Surgery  History of  Section      Allergies    No Known Allergies    Intolerances    Seroquel (Other)      ANTIMICROBIALS:  meropenem  IVPB 1000 every 8 hours  vancomycin  IVPB 1000 every 12 hours      OTHER MEDS:  ALBUTerol/ipratropium for Nebulization 3 milliLiter(s) Nebulizer every 6 hours  dextrose 40% Gel 15 Gram(s) Oral once PRN  dextrose 5%. 1000 milliLiter(s) IV Continuous <Continuous>  dextrose 50% Injectable 12.5 Gram(s) IV Push once  dextrose 50% Injectable 25 Gram(s) IV Push once  dextrose 50% Injectable 25 Gram(s) IV Push once  enoxaparin Injectable 40 milliGRAM(s) SubCutaneous every 24 hours  fentaNYL   Patch  25 MICROgram(s)/Hr 1 Patch Transdermal every 72 hours  furosemide    Tablet 40 milliGRAM(s) Oral two times a day  gabapentin 300 milliGRAM(s) Oral three times a day  glucagon  Injectable 1 milliGRAM(s) IntraMuscular once PRN  influenza   Vaccine 0.5 milliLiter(s) IntraMuscular once  insulin glargine Injectable (LANTUS) 20 Unit(s) SubCutaneous at bedtime  insulin lispro (HumaLOG) corrective regimen sliding scale   SubCutaneous three times a day before meals  insulin lispro Injectable (HumaLOG) 10 Unit(s) SubCutaneous three times a day before meals  levothyroxine 137 MICROGram(s) Oral daily  ondansetron Injectable 4 milliGRAM(s) IV Push every 6 hours PRN  oxyCODONE    IR 5 milliGRAM(s) Oral every 4 hours PRN  pantoprazole    Tablet 40 milliGRAM(s) Oral before breakfast  predniSONE   Tablet 10 milliGRAM(s) Oral daily  primidone 25 milliGRAM(s) Oral daily  sildenafil (REVATIO) 20 milliGRAM(s) Oral three times a day  simvastatin 20 milliGRAM(s) Oral at bedtime  traMADol 50 milliGRAM(s) Oral two times a day      SOCIAL HISTORY:    FAMILY HISTORY:  No pertinent family history in first degree relatives      Drug Dosing Weight  Height (cm): 154.94 (21 Mar 2019 23:50)  Weight (kg): 93.3 (21 Mar 2019 23:50)  BMI (kg/m2): 38.9 (21 Mar 2019 23:50)  BSA (m2): 1.91 (21 Mar 2019 23:50)    PE:    Vital Signs Last 24 Hrs  T(C): 36.7 (22 Mar 2019 08:24), Max: 37.6 (21 Mar 2019 18:26)  T(F): 98.1 (22 Mar 2019 08:24), Max: 99.6 (21 Mar 2019 18:26)  HR: 102 (22 Mar 2019 10:28) (96 - 115)  BP: 95/62 (22 Mar 2019 08:24) (95/62 - 144/74)  BP(mean): --  RR: 20 (22 Mar 2019 08:24) (16 - 24)  SpO2: 96% (22 Mar 2019 10:28) (93% - 100%)    Gen: AOx3, NAD, non-toxic, pleasant  CV: S1+S2 normal, no murmurs, nontachycardic  Resp: Clear bilat, no resp distress, no crackles/wheezes  Abd: Soft, nontender, +BS  Ext: No LE edema, no wounds  : No Antoine  IV/Skin: No thrombophlebitis  Msk: No low back pain, no arthralgias, no joint swelling  Neuro: No sensory deficits, no motor deficits    LABS:                          9.8    15.3  )-----------( 409      ( 22 Mar 2019 10:20 )             32.4           134<L>  |  95<L>  |  36<H>  ----------------------------<  209<H>  4.0   |  23  |  1.00    Ca    9.6      22 Mar 2019 10:20  Mg     2.1         TPro  6.8  /  Alb  3.6  /  TBili  0.1<L>  /  DBili  x   /  AST  16  /  ALT  14  /  AlkPhos  68            MICROBIOLOGY:  v  URINE MIDSTREAM  19 --  --  --      BLOOD PERIPHERAL  19 --  --  --      BLOOD PERIPHERAL  19 --  --  --      BLOOD  19 --  --  --      BLOOD VENOUS  19 --  --  --      ENDOTRACHEAL SPECIMEN  19 --  --  --      BLOOD PERIPHERAL  19 --  --  --      ENDOTRACHEAL SPECIMEN  19 --  --  --    Rapid RVP Result: NotDetec ( @ 19:29)    RADIOLOGY:    < from: CT Angio Chest w/ IV Cont (19 @ 11:03) >  Impression: No pulmonary embolus is noted within the main, right and left   main, lobar and segmental pulmonary arteries bilaterally. Evaluation of   the subsegmental pulmonary arteries is limited due to breathing motion   artifact.    Patchy opacities/consolidations within both lower lobes and the right   middle lobe have decreased when compared to previous exam. Residual   patchy opacities are still noted.    Patchy groundglass opacities are noted within both upper lobes which were   not present on the previous examination.    < end of copied text > HPI:  63 year old female with HTN, DM2, COPD/CHRIS on nocturnal CPAP, discharged yesterday from [facility] after a prolonged hospitalization for SBO requiring lysis of adhesions, complicated by hypercapneic respiratory failure requiring intubation. This morning pt awoke with shortness of breath and chest tightness, was supposed to use CPAP last night but nursing facility did not have the machine. Pt oxygen dependent on 4L at baseline. Feeling better now with supplemental oxygen. No fevers or chills. Occasional cough. Has remained afebrile, leukocytosis, tachycardic. RVP negative. CTA chest with no pulmonary embolism, but with patchy opacities/ consolidations within both lower lobes and the RML which have decreased compared to prior exam. Residual patchy opacities are still noted. New patchy groundglass opacities are noted within both upper lobes.     At [facility], she was admitted for SBO and underwent an exlap 12/15 closed with retention sutures, eviscerated on  and closed with additional retention sutures, course complicated with multifocal PNA, developed hypercapnic respiratory failure needing intubated , then bronched , then found to have enterococcal bacteremia which cleared on vancomycin.     PAST MEDICAL & SURGICAL HISTORY:  Diverticulosis  CVA (Cerebral Vascular Accident)  RA (Rheumatoid Arthritis)  Tooth Abscess  Arthritis  Cigarette Smoker  Chronic Asthma  Adult Hypothyroidism  Borderline Diabetes Mellitus  High Cholesterol  H/O: HTN  Wrist Disorder: S/P Surgery  History of  Section    Allergies    No Known Allergies    Intolerances    Seroquel (Other)      ANTIMICROBIALS:  meropenem  IVPB 1000 every 8 hours  vancomycin  IVPB 1000 every 12 hours      OTHER MEDS:  ALBUTerol/ipratropium for Nebulization 3 milliLiter(s) Nebulizer every 6 hours  dextrose 40% Gel 15 Gram(s) Oral once PRN  dextrose 5%. 1000 milliLiter(s) IV Continuous <Continuous>  dextrose 50% Injectable 12.5 Gram(s) IV Push once  dextrose 50% Injectable 25 Gram(s) IV Push once  dextrose 50% Injectable 25 Gram(s) IV Push once  enoxaparin Injectable 40 milliGRAM(s) SubCutaneous every 24 hours  fentaNYL   Patch  25 MICROgram(s)/Hr 1 Patch Transdermal every 72 hours  furosemide    Tablet 40 milliGRAM(s) Oral two times a day  gabapentin 300 milliGRAM(s) Oral three times a day  glucagon  Injectable 1 milliGRAM(s) IntraMuscular once PRN  influenza   Vaccine 0.5 milliLiter(s) IntraMuscular once  insulin glargine Injectable (LANTUS) 20 Unit(s) SubCutaneous at bedtime  insulin lispro (HumaLOG) corrective regimen sliding scale   SubCutaneous three times a day before meals  insulin lispro Injectable (HumaLOG) 10 Unit(s) SubCutaneous three times a day before meals  levothyroxine 137 MICROGram(s) Oral daily  ondansetron Injectable 4 milliGRAM(s) IV Push every 6 hours PRN  oxyCODONE    IR 5 milliGRAM(s) Oral every 4 hours PRN  pantoprazole    Tablet 40 milliGRAM(s) Oral before breakfast  predniSONE   Tablet 10 milliGRAM(s) Oral daily  primidone 25 milliGRAM(s) Oral daily  sildenafil (REVATIO) 20 milliGRAM(s) Oral three times a day  simvastatin 20 milliGRAM(s) Oral at bedtime  traMADol 50 milliGRAM(s) Oral two times a day      SOCIAL HISTORY: Former smoker    FAMILY HISTORY:  Mother with heart attack      Drug Dosing Weight  Height (cm): 154.94 (21 Mar 2019 23:50)  Weight (kg): 93.3 (21 Mar 2019 23:50)  BMI (kg/m2): 38.9 (21 Mar 2019 23:50)  BSA (m2): 1.91 (21 Mar 2019 23:50)    PE:    Vital Signs Last 24 Hrs  T(C): 36.7 (22 Mar 2019 08:24), Max: 37.6 (21 Mar 2019 18:26)  T(F): 98.1 (22 Mar 2019 08:24), Max: 99.6 (21 Mar 2019 18:26)  HR: 102 (22 Mar 2019 10:28) (96 - 115)  BP: 95/62 (22 Mar 2019 08:24) (95/62 - 144/74)  BP(mean): --  RR: 20 (22 Mar 2019 08:24) (16 - 24)  SpO2: 96% (22 Mar 2019 10:28) (93% - 100%)    Gen: AOx3, NAD  CV: S1+S2 normal, no murmurs  Resp: Clear bilat, no resp distress  Abd: Soft, nontender, +BS  Ext: No LE edema, no wounds, chronic right hand shaking  : No Antoine  IV/Skin: No thrombophlebitis, abd wound, superficial bleeding, no purulence  Msk: No low back pain, no arthralgias, no joint swelling  Neuro: No sensory deficits, no motor deficits    LABS:                          9.8    15.3  )-----------( 409      ( 22 Mar 2019 10:20 )             32.4           134<L>  |  95<L>  |  36<H>  ----------------------------<  209<H>  4.0   |  23  |  1.00    Ca    9.6      22 Mar 2019 10:20  Mg     2.1         TPro  6.8  /  Alb  3.6  /  TBili  0.1<L>  /  DBili  x   /  AST  16  /  ALT  14  /  AlkPhos  68            MICROBIOLOGY:  v  URINE MIDSTREAM  19 --  --  --      BLOOD PERIPHERAL  19 --  --  --      BLOOD PERIPHERAL  19 --  --  --      BLOOD  19 --  --  --      BLOOD VENOUS  19 --  --  --      ENDOTRACHEAL SPECIMEN  19 --  --  --      BLOOD PERIPHERAL  19 --  --  --      ENDOTRACHEAL SPECIMEN  19 --  --  --    Rapid RVP Result: NotDetec ( @ 19:29)    RADIOLOGY:    < from: CT Angio Chest w/ IV Cont (19 @ 11:03) >  Impression: No pulmonary embolus is noted within the main, right and left   main, lobar and segmental pulmonary arteries bilaterally. Evaluation of   the subsegmental pulmonary arteries is limited due to breathing motion   artifact.    Patchy opacities/consolidations within both lower lobes and the right   middle lobe have decreased when compared to previous exam. Residual   patchy opacities are still noted.    Patchy groundglass opacities are noted within both upper lobes which were   not present on the previous examination.    < end of copied text >

## 2019-03-22 NOTE — CONSULT NOTE ADULT - SUBJECTIVE AND OBJECTIVE BOX
HISTORY OF PRESENT ILLNESS: HPI:  63yof w/ HTN, DM2, COPD/CHRIS on nocturnal CPAP, normal LV function un echo  moderate pulmonary HTN discharged yesterday from [facility] after a prolonged hospitalization for SBO requiring lysis of adhesions, complicated by hypercapneic respiratory failure requiring intubation. This morning pt awoke with shortness of breath and chest tightness, was supposed to use CPAP last night but nursing facility did not have the machine. Pt oxygen dependent on 4L at baseline. Feeling better now with supplemental oxygen. No fevers or chills. Occasional cough. (21 Mar 2019 19:33)      PAST MEDICAL & SURGICAL HISTORY:  Diverticulosis  CVA (Cerebral Vascular Accident)  RA (Rheumatoid Arthritis)  Tooth Abscess  Arthritis  Cigarette Smoker  Chronic Asthma  Adult Hypothyroidism  Borderline Diabetes Mellitus  High Cholesterol  H/O: HTN  Wrist Disorder: S/P Surgery  History of  Section          MEDICATIONS:  MEDICATIONS  (STANDING):  ALBUTerol/ipratropium for Nebulization 3 milliLiter(s) Nebulizer every 6 hours  dextrose 5%. 1000 milliLiter(s) (50 mL/Hr) IV Continuous <Continuous>  dextrose 50% Injectable 12.5 Gram(s) IV Push once  dextrose 50% Injectable 25 Gram(s) IV Push once  dextrose 50% Injectable 25 Gram(s) IV Push once  enoxaparin Injectable 40 milliGRAM(s) SubCutaneous every 24 hours  fentaNYL   Patch  25 MICROgram(s)/Hr 1 Patch Transdermal every 72 hours  furosemide    Tablet 40 milliGRAM(s) Oral two times a day  gabapentin 300 milliGRAM(s) Oral three times a day  influenza   Vaccine 0.5 milliLiter(s) IntraMuscular once  insulin glargine Injectable (LANTUS) 20 Unit(s) SubCutaneous at bedtime  insulin lispro (HumaLOG) corrective regimen sliding scale   SubCutaneous three times a day before meals  insulin lispro Injectable (HumaLOG) 10 Unit(s) SubCutaneous three times a day before meals  levothyroxine 137 MICROGram(s) Oral daily  meropenem  IVPB 1000 milliGRAM(s) IV Intermittent every 8 hours  pantoprazole    Tablet 40 milliGRAM(s) Oral before breakfast  predniSONE   Tablet 10 milliGRAM(s) Oral daily  primidone 25 milliGRAM(s) Oral daily  sildenafil (REVATIO) 20 milliGRAM(s) Oral three times a day  simvastatin 20 milliGRAM(s) Oral at bedtime  traMADol 50 milliGRAM(s) Oral two times a day  vancomycin  IVPB 1000 milliGRAM(s) IV Intermittent every 12 hours      Allergies    No Known Allergies    Intolerances    Seroquel (Other)      FAMILY HISTORY:  No pertinent family history in first degree relatives    Non-contributary for premature coronary disease or sudden cardiac death    SOCIAL HISTORY:    [X] Non-smoker  [ ] Smoker  [ ] Alcohol      REVIEW OF SYSTEMS:  [ ]chest pain  [ Xhortness of breath  [  ]palpitations  [  ]syncope  [ ]near syncope [ ]upper extremity weakness   [ ] lower extremity weakness  [  ]diplopia  [  ]altered mental status   [  ]fevers  [ ]chills [ ]nausea  [ ]vomitting  [  ]dysphagia    [ ]abdominal pain  [ ]melena  [ ]BRBPR    [  ]epistaxis  [  ]rash    [ ]lower extremity edema        [X ] All others negative	  [ ] Unable to obtain      LABS:	 	    CARDIAC MARKERS:                              9.8    15.3  )-----------( 409      ( 22 Mar 2019 10:20 )             32.4     Hb Trend: 9.8<--        134<L>  |  95<L>  |  36<H>  ----------------------------<  209<H>  4.0   |  23  |  1.00    Ca    9.6      22 Mar 2019 10:20  Mg     2.1         TPro  6.8  /  Alb  3.6  /  TBili  0.1<L>  /  DBili  x   /  AST  16  /  ALT  14  /  AlkPhos  68      Creatinine Trend: 1.00<--, 1.02<--, 1.14<--, 1.19<--, 1.05<--, 1.10<--      PHYSICAL EXAM:  T(C): 36.7 (19 @ 08:24), Max: 37.6 (19 @ 18:26)  HR: 118 (19 @ 13:59) (96 - 118)  BP: 106/63 (19 @ 13:59) (95/62 - 144/74)  RR: 20 (19 @ 08:24) (19 - 24)  SpO2: 87% (19 @ 13:59) (87% - 100%)  Wt(kg): --  I&O's Summary    21 Mar 2019 07:  -  22 Mar 2019 07:00  --------------------------------------------------------  IN: 0 mL / OUT: 300 mL / NET: -300 mL    22 Mar 2019 07:  -  22 Mar 2019 14:25  --------------------------------------------------------  IN: 350 mL / OUT: 0 mL / NET: 350 mL        Gen: Appears well in NAD  HEENT:  (-)icterus (-)pallor  CV: N S1 S2 1/6 OJ (+)2 Pulses B/l  Resp:  Clear to ausculatation B/L, normal effort  GI: (+) BS Soft, NT, ND  Lymph:  (-)Edema, (-)obvious lymphadenopathy  Skin: Warm to touch, Normal turgor  Psych: Appropriate mood and affect      ECG:  	sinus Tachycardia 109 BPM, nonspecific T wave abnormality    RADIOLOGY:         CXR:  < from: Xray Chest 1 View- PORTABLE-Urgent (19 @ 11:18) >  The heart is enlarged. Improvingopacities in both lungs when compared to   previous study done on 2019. Those changes are compatible with   interstitial fluid however an infectious process cannot be ruled out   entirely. Severe degenerative changes of the right shoulder. Degenerative   changes of the thoracic spine is evident as well.    < end of copied text >      ASSESSMENT/PLAN: 	63y Female  HTN, DM2, COPD/CHRIS on nocturnal CPAP, normal LV function un echo  moderate pulmonary HTN discharged yesterday from [facility] after a prolonged hospitalization for SBO requiring lysis of adhesions, complicated by hypercapneic respiratory failure requiring intubation now being treated for HCAP..    - Abx per primary team  - Cpap per Pulm  - Echo  - will follow with you    I once again thank you for allowing me to participate in the care of your patient.  If you have any questions or concerns please do not hesitate to contact me.    Vimal Joseph MD, [facility]  BEEPER [phone]

## 2019-03-22 NOTE — CONSULT NOTE ADULT - SUBJECTIVE AND OBJECTIVE BOX
Patient is a 63y old  Female who presents with a chief complaint of sob (21 Mar 2019 19:33)      HPI:  63yof w/ HTN, DM2, COPD/CHRIS on nocturnal CPAP, discharged yesterday from [facility] after a prolonged hospitalization for SBO requiring lysis of adhesions, complicated by hypercapneic respiratory failure requiring intubation. This morning pt awoke with shortness of breath and chest tightness, was supposed to use CPAP last night but nursing facility did not have the machine. Pt oxygen dependent on 4L at baseline. Feeling better now with supplemental oxygen. No fevers or chills. Occasional cough. (21 Mar 2019 19:33)   pt was recently admitted at [facility] with following history   63 year old female with PMHx of HTN, DM, hypothyroidism, RA (prednisone 10mg), pulmonary HTN, CHRIS, and COPD (3L at home) who was originally admitted on 12/15 for SBO s/p ex lap with lysis of adhesions (4 retention sutures) c/b  she eviscerated 2/2 a coughing episode s/p ex lap (6 retention sutures) c/b multifocal pneumonia, developed hypercapnic respiratory failure requiring intubation , s/p bronch 18, and extubated on , course further c/b Enterococci bacteremia now readmitted to MICU for hypoxic respiratory failure 2/2 atelectasis requiring intubation . Had grown ESBL in urine, s/p ertapenem.  Extubated to high flow/ BIPAP.    pt was recently discharged from [facility] and now admitted here with chest pain and SOB:   Today pt is alert and awake and says she is pretty good with no chest pain and her breathing is pretty good too: She was placed on cpap last night :     she was on night time bipap at [facility]     ?FOLLOWING PRESENT  [y: 10 years ago  ] Hx of PE/DVT, [y ] Hx COPD, [ x] Hx of Asthma, [y ] Hx of Hospitalization, [ y]  Hx of BiPAP/CPAP use, [ y] Hx of CHRIS    Allergies    No Known Allergies    Intolerances    Seroquel (Other)      PAST MEDICAL & SURGICAL HISTORY:  Diverticulosis  CVA (Cerebral Vascular Accident)  RA (Rheumatoid Arthritis)  Tooth Abscess  Arthritis  Cigarette Smoker  Chronic Asthma  Adult Hypothyroidism  Borderline Diabetes Mellitus  High Cholesterol  H/O: HTN  Wrist Disorder: S/P Surgery  History of  Section      FAMILY HISTORY:  No pertinent family history in first degree relatives      Social History: [40 pk years  ] TOBACCO                  [ x ] ETOH                                 [ x ] IVDA/DRUGS    REVIEW OF SYSTEMS      General:	x    Skin/Breast:x  	  Ophthalmologic:x  	  ENMT:	x    Respiratory and Thorax: chest pain, SOB   	  Cardiovascular:	x    Gastrointestinal:	x    Genitourinary:	x    Musculoskeletal:	x    Neurological:	x    Psychiatric:	x    Hematology/Lymphatics:	x    Endocrine:	x    Allergic/Immunologic:	x    MEDICATIONS  (STANDING):  ALBUTerol/ipratropium for Nebulization 3 milliLiter(s) Nebulizer every 6 hours  dextrose 5%. 1000 milliLiter(s) (50 mL/Hr) IV Continuous <Continuous>  dextrose 50% Injectable 12.5 Gram(s) IV Push once  dextrose 50% Injectable 25 Gram(s) IV Push once  dextrose 50% Injectable 25 Gram(s) IV Push once  enoxaparin Injectable 40 milliGRAM(s) SubCutaneous every 24 hours  fentaNYL   Patch  25 MICROgram(s)/Hr 1 Patch Transdermal every 72 hours  furosemide    Tablet 40 milliGRAM(s) Oral two times a day  gabapentin 300 milliGRAM(s) Oral three times a day  influenza   Vaccine 0.5 milliLiter(s) IntraMuscular once  insulin glargine Injectable (LANTUS) 20 Unit(s) SubCutaneous at bedtime  insulin lispro (HumaLOG) corrective regimen sliding scale   SubCutaneous three times a day before meals  insulin lispro Injectable (HumaLOG) 10 Unit(s) SubCutaneous three times a day before meals  levothyroxine 137 MICROGram(s) Oral daily  meropenem  IVPB 1000 milliGRAM(s) IV Intermittent every 8 hours  pantoprazole    Tablet 40 milliGRAM(s) Oral before breakfast  predniSONE   Tablet 10 milliGRAM(s) Oral daily  primidone 25 milliGRAM(s) Oral daily  sildenafil (REVATIO) 20 milliGRAM(s) Oral three times a day  simvastatin 20 milliGRAM(s) Oral at bedtime  traMADol 50 milliGRAM(s) Oral two times a day  vancomycin  IVPB 1000 milliGRAM(s) IV Intermittent every 12 hours    MEDICATIONS  (PRN):  dextrose 40% Gel 15 Gram(s) Oral once PRN Blood Glucose LESS THAN 70 milliGRAM(s)/deciliter  glucagon  Injectable 1 milliGRAM(s) IntraMuscular once PRN Glucose LESS THAN 70 milligrams/deciliter  ondansetron Injectable 4 milliGRAM(s) IV Push every 6 hours PRN Nausea  oxyCODONE    IR 5 milliGRAM(s) Oral every 4 hours PRN moderate and severe pain       Vital Signs Last 24 Hrs  T(C): 36.7 (22 Mar 2019 08:24), Max: 37.6 (21 Mar 2019 18:26)  T(F): 98.1 (22 Mar 2019 08:24), Max: 99.6 (21 Mar 2019 18:26)  HR: 97 (22 Mar 2019 08:24) (96 - 118)  BP: 95/62 (22 Mar 2019 08:24) (95/62 - 144/74)  BP(mean): --  RR: 20 (22 Mar 2019 08:24) (16 - 24)  SpO2: 96% (22 Mar 2019 08:24) (92% - 100%)        I&O's Summary    21 Mar 2019 07:01  -  22 Mar 2019 07:00  --------------------------------------------------------  IN: 0 mL / OUT: 300 mL / NET: -300 mL        Physical Exam:   GENERAL: NAD, well-groomed, well-developed  HEENT: KWAME/   Atraumatic, Normocephalic  ENMT: No tonsillar erythema, exudates, or enlargement; Moist mucous membranes, Good dentition, No lesions  NECK: Supple, No JVD, Normal thyroid  CHEST/LUNG: Clear to auscultation bilaterally  CVS: Regular rate and rhythm; No murmurs, rubs, or gallops  GI: : Soft, Nontender, Nondistended; Bowel sounds present  NERVOUS SYSTEM:  Alert & Oriented X3  EXTREMITIES:  mild edema: contracted  fingers: rigth side  LYMPH: No lymphadenopathy noted  SKIN: No rashes or lesions  ENDOCRINOLOGY: No Thyromegaly  PSYCH: Appropriate    Labs:                              9.9    13.3  )-----------( 412      ( 21 Mar 2019 10:41 )             30.8                         9.2    13.40 )-----------( 419      ( 19 Mar 2019 12:50 )             31.0     03-21    135  |  95<L>  |  41<H>  ----------------------------<  143<H>  4.6   |  21<L>  |  1.02  03-19    x   |  x   |  x   ----------------------------<  x   4.3   |  x   |  x   03-19    135  |  94<L>  |  44<H>  ----------------------------<  122<H>  6.0<H>   |  24  |  1.14    Ca    10.0      21 Mar 2019 10:41  Mg     2.1         TPro  7.1  /  Alb  3.7  /  TBili  0.1<L>  /  DBili  x   /  AST  27  /  ALT  17  /  AlkPhos  71      CAPILLARY BLOOD GLUCOSE      POCT Blood Glucose.: 176 mg/dL (22 Mar 2019 08:34)  POCT Blood Glucose.: 175 mg/dL (21 Mar 2019 22:35)    LIVER FUNCTIONS - ( 21 Mar 2019 10:41 )  Alb: 3.7 g/dL / Pro: 7.1 g/dL / ALK PHOS: 71 U/L / ALT: 17 U/L / AST: 27 U/L / GGT: x           PT/INR - ( 21 Mar 2019 10:41 )   PT: 11.1 sec;   INR: 0.97 ratio         PTT - ( 21 Mar 2019 10:41 )  PTT:22.9 sec    D DImer  Serum Pro-Brain Natriuretic Peptide: 100 pg/mL ( @ 10:41)      Studies  Chest X-RAY  CT SCAN Chest   CT Abdomen  Venous Dopplers: LE:   Others    < from: Xray Chest 1 View- PORTABLE-Urgent (19 @ 11:18) >    EXAM:  XR CHEST PORTABLE URGENT 1V                            PROCEDURE DATE:  2019            INTERPRETATION:  A single chest x-ray was obtained on 2019.    Indication: Chest pain.    Impression:    The heart is enlarged. Improvingopacities in both lungs when compared to   previous study done on 2019. Those changes are compatible with   interstitial fluid however an infectious process cannot be ruled out   entirely. Severe degenerative changes of the right shoulder. Degenerative   changes of the thoracic spine is evident as well.              < from: CT Angio Chest w/ IV Cont (19 @ 11:03) >  Heart is normal in size. No pericardial effusion is noted. Pulmonary   arteries are normal in caliber. Main, right and left main, lobar and   segmental pulmonary arteries demonstrate no filling defects. Evaluation   of the subsegmental branches is limited due to breathing motion artifact.     No endobronchial lesions are noted. Centrilobular emphysema is noted   bilaterally. Compared to the previous examination, the patchy   opacities/consolidation within the right middle lobe and both lower lobes   have decreased. Residual patchy opacities are still noted throughout both   lungs. New patchy groundglass opacities are noted within both upper   lobes. No pleural effusions are noted.    Circumferential thickening is noted involving the esophagus. The   appearance is unchanged when compared to previous exam.    Below the diaphragm, visualized portions of the abdomen demonstrate   low-attenuation lesions within both kidneys which are unchanged when   compared to previous exam.     Degenerative changes of the spine are noted.    Impression: No pulmonary embolus is noted within the main, right and left   main, lobar and segmental pulmonary arteries bilaterally. Evaluation of   the subsegmental pulmonary arteries is limited due to breathing motion   artifact.    Patchy opacities/consolidations within both lower lobes and the right   middle lobe have decreased when compared to previous exam. Residual   patchy opacities are still noted.    Patchy groundglass opacities are noted within both upper lobes which were   not present on the previous examination.    < end of copied text >        MARISOL KEMP M.D., ATTENDING RADIOLOGIST  This document has been electronically signed. Mar 21 2019 11:28AM        < end of copied text >

## 2019-03-22 NOTE — CHART NOTE - NSCHARTNOTEFT_GEN_A_CORE
63yof w/ HTN, DM2, COPD/CHRIS on nocturnal CPAP, discharged yesterday from [facility] after a prolonged hospitalization for SBO requiring lysis of adhesions, complicated by hypercapneic respiratory failure requiring intubation. This morning pt awoke with shortness of breath and chest tightness, was supposed to use CPAP last night but nursing facility did not have the machine. Pt oxygen dependent on 4L at baseline. Feeling better now with supplemental oxygen. No fevers or chills.  pt s/p Exploratory laparotomy, closure of abdominal wall on 12/30 at [facility] by Dr Grace.  RN placed consult for concern re abdominal incision.  D/w team to consult General Surgery. Agreed will defer for now.  Remain available as requested.

## 2019-03-22 NOTE — CONSULT NOTE ADULT - ASSESSMENT
63yof w/ HTN, DM2, COPD/CHRIS on nocturnal CPAP, discharged yesterday from [facility] after a prolonged hospitalization for SBO requiring lysis of adhesions, complicated by hypercapneic respiratory failure requiring intubation. This morning pt awoke with shortness of breath and chest tightness, was supposed to use CPAP last night but nursing facility did not have the machine. Pt oxygen dependent on 4L at baseline. Feeling better now with supplemental oxygen. No fevers or chills. Occasional cough.  originally admitted on 12/15 for SBO s/p ex lap with lysis of adhesions (4 retention sutures) c/b 12/30 she eviscerated 2/2 a coughing episode s/p ex lap (6 retention sutures) c/b multifocal pneumonia, developed hypercapnic respiratory failure requiring intubation 1/6, s/p bronch 1/9/18, and extubated on 1/24, course further c/b Enterococci bacteremia now readmitted to MICU for hypoxic respiratory failure 2/2 atelectasis requiring intubation 01/31. Had grown ESBL in urine, s/p ertapenem.  Extubated to high flow/ BIPAP.    pt was recently discharged from [facility] and now admitted here with chest pain and SOB:   Today pt is alert and awake and says she is pretty good with no chest pain and her breathing is pretty good too: She was placed on cpap last night :

## 2019-03-22 NOTE — CONSULT NOTE ADULT - ASSESSMENT
63 year old female with HTN, DM2, COPD/CHRIS on nocturnal CPAP, discharged yesterday from [facility] after a prolonged hospitalization for SBO requiring lysis of adhesions, complicated by hypercapneic respiratory failure requiring intubation.     No fevers or chills  Occasional cough  Afebrile, leukocytosis, tachycardic  RVP negative  CTA chest with no pulmonary embolism, but with patchy opacities/ consolidations within both lower lobes and the RML which have decreased compared to prior exam. Residual patchy opacities are still noted. New patchy groundglass opacities are noted within both upper lobes.   Suspect Pneumonia  At [facility] on a recent admission, was treated for enterococcal bacteremia which cleared on vancomycin.     Recommend:  -Continue current antibiotic regimen vanco and meropenem  -Check blood cultures x 2 sets  -Trend WBC  -Monitor for fevers  -Check urine legionella ag

## 2019-03-22 NOTE — CONSULT NOTE ADULT - PROBLEM SELECTOR RECOMMENDATION 2
She has new infiltrates on ct scan chest done in this hospital: She has no PE , however subsegmental arteries were limited for evaluation: Her lung exam is clear: Cont broad spectrum antibiotics: ID to see: She had positive blood cultures on last admission:

## 2019-03-22 NOTE — PROGRESS NOTE ADULT - ASSESSMENT
63yof w/ HTN, DM2, COPD/CHRIS on nocturnal CPAP, discharged yesterday from [facility] after a prolonged hospitalization for SBO requiring lysis of adhesions, complicated by hypercapneic respiratory failure requiring intubation. This morning pt awoke with shortness of breath and chest tightness, was supposed to use CPAP last night but nursing facility did not have the machine. Pt oxygen dependent on 4L at baseline. Feeling better now with supplemental oxygen. No fevers or chills. Occasional cough.    Problem/Plan - 1:  ·  Problem: HAP (hospital-acquired pneumonia).  Plan: cw vanco and meropenam  id and pulmonary fu  will monitor.     Problem/Plan - 2:  ·  Problem: Oxygen dependent.  Plan: bipap at night   pulmonary fu.     Problem/Plan - 3:  ·  Problem: Diabetes.  Plan: monitor FS  ISS.     Problem/Plan - 4:  ·  Problem: Adult Hypothyroidism.  Plan: cw synthyroid.

## 2019-03-22 NOTE — CONSULT NOTE ADULT - PROBLEM SELECTOR RECOMMENDATION 9
pt had a prolonged course of resp failure at [facility] following abdominal surgery: She was extubated and started on bipap and was dced on bipap overnight: She is admitted again at [facility] for suspected HCAP: She was placed on cpap last night : Her Last ABG from Feb noted: had metabolic alkalosis ,mostly: will repeat ABG today and depending upon the abg will decide about BiPAP:

## 2019-03-23 DIAGNOSIS — Z51.81 ENCOUNTER FOR THERAPEUTIC DRUG LEVEL MONITORING: ICD-10-CM

## 2019-03-23 LAB
GLUCOSE BLDC GLUCOMTR-MCNC: 139 MG/DL — HIGH (ref 70–99)
GLUCOSE BLDC GLUCOMTR-MCNC: 208 MG/DL — HIGH (ref 70–99)
GLUCOSE BLDC GLUCOMTR-MCNC: 216 MG/DL — HIGH (ref 70–99)
GLUCOSE BLDC GLUCOMTR-MCNC: 228 MG/DL — HIGH (ref 70–99)
VANCOMYCIN TROUGH SERPL-MCNC: 25.4 UG/ML — CRITICAL HIGH (ref 10–20)

## 2019-03-23 PROCEDURE — 99232 SBSQ HOSP IP/OBS MODERATE 35: CPT

## 2019-03-23 RX ORDER — OXYCODONE HYDROCHLORIDE 5 MG/1
10 TABLET ORAL EVERY 4 HOURS
Qty: 0 | Refills: 0 | Status: DISCONTINUED | OUTPATIENT
Start: 2019-03-23 | End: 2019-03-30

## 2019-03-23 RX ORDER — DIPHENHYDRAMINE HCL 50 MG
25 CAPSULE ORAL ONCE
Qty: 0 | Refills: 0 | Status: COMPLETED | OUTPATIENT
Start: 2019-03-23 | End: 2019-03-23

## 2019-03-23 RX ORDER — VANCOMYCIN HCL 1 G
750 VIAL (EA) INTRAVENOUS EVERY 12 HOURS
Qty: 0 | Refills: 0 | Status: DISCONTINUED | OUTPATIENT
Start: 2019-03-23 | End: 2019-03-27

## 2019-03-23 RX ADMIN — MEROPENEM 100 MILLIGRAM(S): 1 INJECTION INTRAVENOUS at 18:02

## 2019-03-23 RX ADMIN — INSULIN GLARGINE 20 UNIT(S): 100 INJECTION, SOLUTION SUBCUTANEOUS at 22:19

## 2019-03-23 RX ADMIN — OXYCODONE HYDROCHLORIDE 10 MILLIGRAM(S): 5 TABLET ORAL at 16:34

## 2019-03-23 RX ADMIN — OXYCODONE HYDROCHLORIDE 10 MILLIGRAM(S): 5 TABLET ORAL at 21:38

## 2019-03-23 RX ADMIN — Medication 2: at 17:54

## 2019-03-23 RX ADMIN — PRIMIDONE 25 MILLIGRAM(S): 250 TABLET ORAL at 12:28

## 2019-03-23 RX ADMIN — PANTOPRAZOLE SODIUM 40 MILLIGRAM(S): 20 TABLET, DELAYED RELEASE ORAL at 05:53

## 2019-03-23 RX ADMIN — GABAPENTIN 300 MILLIGRAM(S): 400 CAPSULE ORAL at 16:02

## 2019-03-23 RX ADMIN — MEROPENEM 100 MILLIGRAM(S): 1 INJECTION INTRAVENOUS at 02:00

## 2019-03-23 RX ADMIN — Medication 3 MILLILITER(S): at 12:28

## 2019-03-23 RX ADMIN — FENTANYL CITRATE 1 PATCH: 50 INJECTION INTRAVENOUS at 07:55

## 2019-03-23 RX ADMIN — Medication 10 UNIT(S): at 09:31

## 2019-03-23 RX ADMIN — OXYCODONE HYDROCHLORIDE 10 MILLIGRAM(S): 5 TABLET ORAL at 16:00

## 2019-03-23 RX ADMIN — TRAMADOL HYDROCHLORIDE 50 MILLIGRAM(S): 50 TABLET ORAL at 18:01

## 2019-03-23 RX ADMIN — Medication 10 MILLIGRAM(S): at 05:52

## 2019-03-23 RX ADMIN — Medication 250 MILLIGRAM(S): at 18:02

## 2019-03-23 RX ADMIN — Medication 10 UNIT(S): at 17:55

## 2019-03-23 RX ADMIN — GABAPENTIN 300 MILLIGRAM(S): 400 CAPSULE ORAL at 05:53

## 2019-03-23 RX ADMIN — MEROPENEM 100 MILLIGRAM(S): 1 INJECTION INTRAVENOUS at 09:36

## 2019-03-23 RX ADMIN — OXYCODONE HYDROCHLORIDE 5 MILLIGRAM(S): 5 TABLET ORAL at 12:35

## 2019-03-23 RX ADMIN — Medication 10 UNIT(S): at 12:27

## 2019-03-23 RX ADMIN — Medication 20 MILLIGRAM(S): at 00:42

## 2019-03-23 RX ADMIN — TRAMADOL HYDROCHLORIDE 50 MILLIGRAM(S): 50 TABLET ORAL at 06:55

## 2019-03-23 RX ADMIN — GABAPENTIN 300 MILLIGRAM(S): 400 CAPSULE ORAL at 22:18

## 2019-03-23 RX ADMIN — Medication 20 MILLIGRAM(S): at 22:18

## 2019-03-23 RX ADMIN — Medication 2: at 09:30

## 2019-03-23 RX ADMIN — Medication 137 MICROGRAM(S): at 05:53

## 2019-03-23 RX ADMIN — Medication 20 MILLIGRAM(S): at 05:53

## 2019-03-23 RX ADMIN — TRAMADOL HYDROCHLORIDE 50 MILLIGRAM(S): 50 TABLET ORAL at 05:52

## 2019-03-23 RX ADMIN — FENTANYL CITRATE 1 PATCH: 50 INJECTION INTRAVENOUS at 19:27

## 2019-03-23 RX ADMIN — Medication 1 SPRAY(S): at 18:24

## 2019-03-23 RX ADMIN — OXYCODONE HYDROCHLORIDE 10 MILLIGRAM(S): 5 TABLET ORAL at 20:08

## 2019-03-23 RX ADMIN — SIMVASTATIN 20 MILLIGRAM(S): 20 TABLET, FILM COATED ORAL at 22:18

## 2019-03-23 RX ADMIN — Medication 2: at 12:27

## 2019-03-23 RX ADMIN — Medication 3 MILLILITER(S): at 05:52

## 2019-03-23 RX ADMIN — Medication 40 MILLIGRAM(S): at 05:52

## 2019-03-23 RX ADMIN — Medication 25 MILLIGRAM(S): at 22:48

## 2019-03-23 RX ADMIN — Medication 250 MILLIGRAM(S): at 05:53

## 2019-03-23 RX ADMIN — Medication 20 MILLIGRAM(S): at 16:01

## 2019-03-23 RX ADMIN — OXYCODONE HYDROCHLORIDE 5 MILLIGRAM(S): 5 TABLET ORAL at 13:35

## 2019-03-23 RX ADMIN — Medication 40 MILLIGRAM(S): at 17:55

## 2019-03-23 RX ADMIN — Medication 3 MILLILITER(S): at 17:55

## 2019-03-23 NOTE — PROGRESS NOTE ADULT - ASSESSMENT
63yof w/ HTN, DM2, COPD/CHRIS on nocturnal CPAP, discharged yesterday from [facility] after a prolonged hospitalization for SBO requiring lysis of adhesions, complicated by hypercapneic respiratory failure requiring intubation. This morning pt awoke with shortness of breath and chest tightness, was supposed to use CPAP last night but nursing facility did not have the machine. Pt oxygen dependent on 4L at baseline. Feeling better now with supplemental oxygen. No fevers or chills. Occasional cough.  originally admitted on 12/15 for SBO s/p ex lap with lysis of adhesions (4 retention sutures) c/b 12/30 she eviscerated 2/2 a coughing episode s/p ex lap (6 retention sutures) c/b multifocal pneumonia, developed hypercapnic respiratory failure requiring intubation 1/6, s/p bronch 1/9/18, and extubated on 1/24, course further c/b Enterococci bacteremia now readmitted to MICU for hypoxic respiratory failure 2/2 atelectasis requiring intubation 01/31. Had grown ESBL in urine, s/p ertapenem.  Extubated to high flow/ BIPAP.    pt was recently discharged from [facility] and now admitted here with chest pain and SOB:   Today pt is alert and awake and says she is pretty good with no chest pain and her breathing is pretty good too: She was placed on cpap last night :

## 2019-03-23 NOTE — PROGRESS NOTE ADULT - SUBJECTIVE AND OBJECTIVE BOX
Patient is a 63y old  Female who presents with a chief complaint of sob (23 Mar 2019 09:29)      INTERVAL HPI/OVERNIGHT EVENTS:  T(C): 37.1 (03-23-19 @ 08:44), Max: 37.6 (03-22-19 @ 23:35)  HR: 102 (03-23-19 @ 11:07) (101 - 116)  BP: 151/99 (03-23-19 @ 08:44) (115/74 - 151/99)  RR: 22 (03-23-19 @ 08:44) (20 - 22)  SpO2: 97% (03-23-19 @ 11:07) (95% - 100%)  Wt(kg): --  I&O's Summary    22 Mar 2019 07:01  -  23 Mar 2019 07:00  --------------------------------------------------------  IN: 1240 mL / OUT: 950 mL / NET: 290 mL    23 Mar 2019 07:01  -  23 Mar 2019 15:57  --------------------------------------------------------  IN: 730 mL / OUT: 400 mL / NET: 330 mL        LABS:                        9.8    15.3  )-----------( 409      ( 22 Mar 2019 10:20 )             32.4     03-22    134<L>  |  95<L>  |  36<H>  ----------------------------<  209<H>  4.0   |  23  |  1.00    Ca    9.6      22 Mar 2019 10:20    TPro  6.8  /  Alb  3.6  /  TBili  0.1<L>  /  DBili  x   /  AST  16  /  ALT  14  /  AlkPhos  68  03-22        CAPILLARY BLOOD GLUCOSE      POCT Blood Glucose.: 216 mg/dL (23 Mar 2019 12:10)  POCT Blood Glucose.: 228 mg/dL (23 Mar 2019 08:39)  POCT Blood Glucose.: 147 mg/dL (22 Mar 2019 21:57)  POCT Blood Glucose.: 170 mg/dL (22 Mar 2019 17:27)            MEDICATIONS  (STANDING):  ALBUTerol/ipratropium for Nebulization 3 milliLiter(s) Nebulizer every 6 hours  dextrose 5%. 1000 milliLiter(s) (50 mL/Hr) IV Continuous <Continuous>  dextrose 50% Injectable 12.5 Gram(s) IV Push once  dextrose 50% Injectable 25 Gram(s) IV Push once  dextrose 50% Injectable 25 Gram(s) IV Push once  enoxaparin Injectable 40 milliGRAM(s) SubCutaneous every 24 hours  fentaNYL   Patch  25 MICROgram(s)/Hr 1 Patch Transdermal every 72 hours  furosemide    Tablet 40 milliGRAM(s) Oral two times a day  gabapentin 300 milliGRAM(s) Oral three times a day  influenza   Vaccine 0.5 milliLiter(s) IntraMuscular once  insulin glargine Injectable (LANTUS) 20 Unit(s) SubCutaneous at bedtime  insulin lispro (HumaLOG) corrective regimen sliding scale   SubCutaneous three times a day before meals  insulin lispro Injectable (HumaLOG) 10 Unit(s) SubCutaneous three times a day before meals  levothyroxine 137 MICROGram(s) Oral daily  meropenem  IVPB 1000 milliGRAM(s) IV Intermittent every 8 hours  pantoprazole    Tablet 40 milliGRAM(s) Oral before breakfast  predniSONE   Tablet 10 milliGRAM(s) Oral daily  primidone 25 milliGRAM(s) Oral daily  sildenafil (REVATIO) 20 milliGRAM(s) Oral three times a day  simvastatin 20 milliGRAM(s) Oral at bedtime  traMADol 50 milliGRAM(s) Oral two times a day  vancomycin  IVPB 750 milliGRAM(s) IV Intermittent every 12 hours    MEDICATIONS  (PRN):  dextrose 40% Gel 15 Gram(s) Oral once PRN Blood Glucose LESS THAN 70 milliGRAM(s)/deciliter  glucagon  Injectable 1 milliGRAM(s) IntraMuscular once PRN Glucose LESS THAN 70 milligrams/deciliter  ondansetron Injectable 4 milliGRAM(s) IV Push every 6 hours PRN Nausea  oxyCODONE    IR 10 milliGRAM(s) Oral every 4 hours PRN Moderate Pain (4 - 6)  sodium chloride 0.65% Nasal 1 Spray(s) Both Nostrils every 4 hours PRN Nasal Congestion          PHYSICAL EXAM:  GENERAL: NAD, well-groomed, well-developed  HEAD:  Atraumatic, Normocephalic  CHEST/LUNG: Clear to percussion bilaterally; No rales, rhonchi, wheezing, or rubs  HEART: Regular rate and rhythm; No murmurs, rubs, or gallops  ABDOMEN: Soft, Nontender, Nondistended; Bowel sounds present  EXTREMITIES:  2+ Peripheral Pulses, No clubbing, cyanosis, or edema  LYMPH: No lymphadenopathy noted  SKIN: No rashes or lesions    Care Discussed with Consultants/Other Providers [ ] YES  [ ] NO

## 2019-03-23 NOTE — PROGRESS NOTE ADULT - ASSESSMENT
63 year old female with HTN, DM2, COPD/CHRIS on nocturnal CPAP, discharged yesterday from [facility] after a prolonged hospitalization for SBO requiring lysis of adhesions, complicated by hypercapneic respiratory failure requiring intubation.     No fevers or chills  Occasional cough  Afebrile, leukocytosis, tachycardic  RVP negative  CTA chest with no pulmonary embolism, but with patchy opacities/ consolidations within both lower lobes and the RML which have decreased compared to prior exam. Residual patchy opacities are still noted. New patchy groundglass opacities are noted within both upper lobes.   Suspect Pneumonia  At [facility] on a recent admission, was treated for enterococcal bacteremia which cleared on vancomycin.       -Check urine legionella ag

## 2019-03-23 NOTE — PROGRESS NOTE ADULT - ATTENDING COMMENTS
Patient seen and examined, agree with above assessment and plan as transcribed above.    - Abx per primary team  - f/u echo    Vimal Joseph MD, [facility]  BEEPER [phone]

## 2019-03-23 NOTE — PROGRESS NOTE ADULT - SUBJECTIVE AND OBJECTIVE BOX
pt seen and examined, no complaints, ROS - .     ALBUTerol/ipratropium for Nebulization 3 milliLiter(s) Nebulizer every 6 hours  dextrose 40% Gel 15 Gram(s) Oral once PRN  dextrose 5%. 1000 milliLiter(s) IV Continuous <Continuous>  dextrose 50% Injectable 12.5 Gram(s) IV Push once  dextrose 50% Injectable 25 Gram(s) IV Push once  dextrose 50% Injectable 25 Gram(s) IV Push once  enoxaparin Injectable 40 milliGRAM(s) SubCutaneous every 24 hours  fentaNYL   Patch  25 MICROgram(s)/Hr 1 Patch Transdermal every 72 hours  furosemide    Tablet 40 milliGRAM(s) Oral two times a day  gabapentin 300 milliGRAM(s) Oral three times a day  glucagon  Injectable 1 milliGRAM(s) IntraMuscular once PRN  influenza   Vaccine 0.5 milliLiter(s) IntraMuscular once  insulin glargine Injectable (LANTUS) 20 Unit(s) SubCutaneous at bedtime  insulin lispro (HumaLOG) corrective regimen sliding scale   SubCutaneous three times a day before meals  insulin lispro Injectable (HumaLOG) 10 Unit(s) SubCutaneous three times a day before meals  levothyroxine 137 MICROGram(s) Oral daily  meropenem  IVPB 1000 milliGRAM(s) IV Intermittent every 8 hours  ondansetron Injectable 4 milliGRAM(s) IV Push every 6 hours PRN  oxyCODONE    IR 5 milliGRAM(s) Oral every 4 hours PRN  pantoprazole    Tablet 40 milliGRAM(s) Oral before breakfast  predniSONE   Tablet 10 milliGRAM(s) Oral daily  primidone 25 milliGRAM(s) Oral daily  sildenafil (REVATIO) 20 milliGRAM(s) Oral three times a day  simvastatin 20 milliGRAM(s) Oral at bedtime  sodium chloride 0.65% Nasal 1 Spray(s) Both Nostrils every 4 hours PRN  traMADol 50 milliGRAM(s) Oral two times a day  vancomycin  IVPB 1000 milliGRAM(s) IV Intermittent every 12 hours                            9.8    15.3  )-----------( 409      ( 22 Mar 2019 10:20 )             32.4       Hemoglobin: 9.8 g/dL (03-22 @ 10:20)  Hemoglobin: 9.9 g/dL (03-21 @ 10:41)  Hemoglobin: 9.2 g/dL (03-19 @ 12:50)      03-22    134<L>  |  95<L>  |  36<H>  ----------------------------<  209<H>  4.0   |  23  |  1.00    Ca    9.6      22 Mar 2019 10:20  Mg     2.1     03-21    TPro  6.8  /  Alb  3.6  /  TBili  0.1<L>  /  DBili  x   /  AST  16  /  ALT  14  /  AlkPhos  68  03-22    Creatinine Trend: 1.00<--, 1.02<--, 1.14<--, 1.19<--, 1.05<--, 1.10<--    COAGS:           T(C): 37.6 (03-22-19 @ 23:35), Max: 37.6 (03-22-19 @ 23:35)  HR: 101 (03-23-19 @ 06:31) (92 - 118)  BP: 117/64 (03-22-19 @ 23:35) (95/62 - 117/64)  RR: 20 (03-22-19 @ 23:35) (20 - 21)  SpO2: 98% (03-23-19 @ 06:31) (87% - 100%)  Wt(kg): --    I&O's Summary    22 Mar 2019 07:01  -  23 Mar 2019 07:00  --------------------------------------------------------  IN: 1240 mL / OUT: 950 mL / NET: 290 mL      Gen: Appears well in NAD  HEENT:  (-)icterus (-)pallor  CV: N S1 S2 1/6 OJ (+)2 Pulses B/l  Resp:  Clear to ausculatation B/L, normal effort  GI: (+) BS Soft, NT, ND  Lymph:  (-)Edema, (-)obvious lymphadenopathy  Skin: Warm to touch, Normal turgor  Psych: Appropriate mood and affect      ECG:  	sinus Tachycardia 109 BPM, nonspecific T wave abnormality    RADIOLOGY:         CXR:  < from: Xray Chest 1 View- PORTABLE-Urgent (03.21.19 @ 11:18) >  The heart is enlarged. Improvingopacities in both lungs when compared to   previous study done on March 6, 2019. Those changes are compatible with   interstitial fluid however an infectious process cannot be ruled out   entirely. Severe degenerative changes of the right shoulder. Degenerative   changes of the thoracic spine is evident as well.    < end of copied text >      ASSESSMENT/PLAN: 	63y Female  HTN, DM2, COPD/CHRIS on nocturnal CPAP, normal LV function un echo 1/19 moderate pulmonary HTN discharged yesterday from [facility] after a prolonged hospitalization for SBO requiring lysis of adhesions, complicated by hypercapneic respiratory failure requiring intubation now being treated for HCAP..    - Abx per primary team  - Cpap per Pulm, steroids taper , cont revatio for pulm htn ,   - Echo  -  GI / DVT prophylaxis,  keep K>4, mag >2.0   D/W Dr Joseph

## 2019-03-23 NOTE — PROGRESS NOTE ADULT - ATTENDING COMMENTS
Omer Yap  Attending Physician   Division of Infectious Disease  Pager #[phone]  After 5pm/weekend or no response, call #[phone]    Please call the ID service [phone] with questions or concerns over the weekend.

## 2019-03-23 NOTE — PROGRESS NOTE ADULT - ASSESSMENT
Problem/Plan - 1:  ·  Problem: HAP (hospital-acquired pneumonia).  Plan: cw vanco and angela  id and pulmonary fu  will monitor.     Problem/Plan - 2:  ·  Problem: Oxygen dependent.  Plan: bipap at night   pulmonary fu.     Problem/Plan - 3:  ·  Problem: Diabetes.  Plan: monitor FS  ISS.     Problem/Plan - 4:  ·  Problem: Adult Hypothyroidism.  Plan: cw synthyroid.

## 2019-03-23 NOTE — PROGRESS NOTE ADULT - SUBJECTIVE AND OBJECTIVE BOX
AFTAB BASS 63y [id number]    Patient is a 63y old  Female who presents with a chief complaint of sob (23 Mar 2019 08:22)      Follow Up/CC:  ID following for PNA    Interval History/ROS: cough+, no fever    Allergies    No Known Allergies    Intolerances    Seroquel (Other)      ANTIMICROBIALS:  meropenem  IVPB 1000 every 8 hours  vancomycin  IVPB 1000 every 12 hours      MEDICATIONS  (STANDING):  ALBUTerol/ipratropium for Nebulization 3 milliLiter(s) Nebulizer every 6 hours  dextrose 5%. 1000 milliLiter(s) (50 mL/Hr) IV Continuous <Continuous>  dextrose 50% Injectable 12.5 Gram(s) IV Push once  dextrose 50% Injectable 25 Gram(s) IV Push once  dextrose 50% Injectable 25 Gram(s) IV Push once  enoxaparin Injectable 40 milliGRAM(s) SubCutaneous every 24 hours  fentaNYL   Patch  25 MICROgram(s)/Hr 1 Patch Transdermal every 72 hours  furosemide    Tablet 40 milliGRAM(s) Oral two times a day  gabapentin 300 milliGRAM(s) Oral three times a day  influenza   Vaccine 0.5 milliLiter(s) IntraMuscular once  insulin glargine Injectable (LANTUS) 20 Unit(s) SubCutaneous at bedtime  insulin lispro (HumaLOG) corrective regimen sliding scale   SubCutaneous three times a day before meals  insulin lispro Injectable (HumaLOG) 10 Unit(s) SubCutaneous three times a day before meals  levothyroxine 137 MICROGram(s) Oral daily  meropenem  IVPB 1000 milliGRAM(s) IV Intermittent every 8 hours  pantoprazole    Tablet 40 milliGRAM(s) Oral before breakfast  predniSONE   Tablet 10 milliGRAM(s) Oral daily  primidone 25 milliGRAM(s) Oral daily  sildenafil (REVATIO) 20 milliGRAM(s) Oral three times a day  simvastatin 20 milliGRAM(s) Oral at bedtime  traMADol 50 milliGRAM(s) Oral two times a day  vancomycin  IVPB 1000 milliGRAM(s) IV Intermittent every 12 hours    MEDICATIONS  (PRN):  dextrose 40% Gel 15 Gram(s) Oral once PRN Blood Glucose LESS THAN 70 milliGRAM(s)/deciliter  glucagon  Injectable 1 milliGRAM(s) IntraMuscular once PRN Glucose LESS THAN 70 milligrams/deciliter  ondansetron Injectable 4 milliGRAM(s) IV Push every 6 hours PRN Nausea  oxyCODONE    IR 5 milliGRAM(s) Oral every 4 hours PRN moderate and severe pain  sodium chloride 0.65% Nasal 1 Spray(s) Both Nostrils every 4 hours PRN Nasal Congestion        Vital Signs Last 24 Hrs  T(C): 37.1 (23 Mar 2019 08:44), Max: 37.6 (22 Mar 2019 23:35)  T(F): 98.7 (23 Mar 2019 08:44), Max: 99.7 (22 Mar 2019 23:35)  HR: 116 (23 Mar 2019 08:44) (92 - 118)  BP: 151/99 (23 Mar 2019 08:44) (106/63 - 151/99)  BP(mean): --  RR: 22 (23 Mar 2019 08:44) (20 - 22)  SpO2: 95% (23 Mar 2019 08:44) (87% - 100%)    CBC Full  -  ( 22 Mar 2019 10:20 )  WBC Count : 15.3 K/uL  Hemoglobin : 9.8 g/dL  Hematocrit : 32.4 %  Platelet Count - Automated : 409 K/uL  Mean Cell Volume : 92.1 fl  Mean Cell Hemoglobin : 28.0 pg  Mean Cell Hemoglobin Concentration : 30.4 gm/dL  Auto Neutrophil # : x  Auto Lymphocyte # : x  Auto Monocyte # : x  Auto Eosinophil # : x  Auto Basophil # : x  Auto Neutrophil % : x  Auto Lymphocyte % : x  Auto Monocyte % : x  Auto Eosinophil % : x  Auto Basophil % : x    03-22    134<L>  |  95<L>  |  36<H>  ----------------------------<  209<H>  4.0   |  23  |  1.00    Ca    9.6      22 Mar 2019 10:20  Mg     2.1     03-21    TPro  6.8  /  Alb  3.6  /  TBili  0.1<L>  /  DBili  x   /  AST  16  /  ALT  14  /  AlkPhos  68  03-22    LIVER FUNCTIONS - ( 22 Mar 2019 10:20 )  Alb: 3.6 g/dL / Pro: 6.8 g/dL / ALK PHOS: 68 U/L / ALT: 14 U/L / AST: 16 U/L / GGT: x               MICROBIOLOGY:  URINE MIDSTREAM  03-07-19 --  --  --      BLOOD PERIPHERAL  03-06-19 --  --  --      BLOOD PERIPHERAL  03-05-19 --  --  --      BLOOD  03-02-19 --  --  --      BLOOD VENOUS  02-28-19 --  --  --      ENDOTRACHEAL SPECIMEN  02-24-19 --  --  --      BLOOD PERIPHERAL  02-22-19 --  --  --      ENDOTRACHEAL SPECIMEN  02-22-19 --  --  --        Vancomycin Level, Trough: 25.4 ug/mL (03-23-19 @ 07:27)      Rapid RVP Result: NotDetec (03-21 @ 19:29)      RADIOLOGY  < from: Xray Chest 1 View- PORTABLE-Urgent (03.21.19 @ 11:18) >  The heart is enlarged. Improvingopacities in both lungs when compared to   previous study done on March 6, 2019. Those changes are compatible with   interstitial fluid however an infectious process cannot be ruled out   entirely. Severe degenerative changes of the right shoulder. Degenerative   changes of the thoracic spine is evident as well.

## 2019-03-24 LAB
ANION GAP SERPL CALC-SCNC: 16 MMOL/L — SIGNIFICANT CHANGE UP (ref 5–17)
BUN SERPL-MCNC: 25 MG/DL — HIGH (ref 7–23)
CALCIUM SERPL-MCNC: 9.4 MG/DL — SIGNIFICANT CHANGE UP (ref 8.4–10.5)
CHLORIDE SERPL-SCNC: 98 MMOL/L — SIGNIFICANT CHANGE UP (ref 96–108)
CO2 SERPL-SCNC: 24 MMOL/L — SIGNIFICANT CHANGE UP (ref 22–31)
CREAT SERPL-MCNC: 0.97 MG/DL — SIGNIFICANT CHANGE UP (ref 0.5–1.3)
GLUCOSE BLDC GLUCOMTR-MCNC: 134 MG/DL — HIGH (ref 70–99)
GLUCOSE BLDC GLUCOMTR-MCNC: 156 MG/DL — HIGH (ref 70–99)
GLUCOSE BLDC GLUCOMTR-MCNC: 210 MG/DL — HIGH (ref 70–99)
GLUCOSE BLDC GLUCOMTR-MCNC: 212 MG/DL — HIGH (ref 70–99)
GLUCOSE BLDC GLUCOMTR-MCNC: 69 MG/DL — LOW (ref 70–99)
GLUCOSE BLDC GLUCOMTR-MCNC: 72 MG/DL — SIGNIFICANT CHANGE UP (ref 70–99)
GLUCOSE SERPL-MCNC: 149 MG/DL — HIGH (ref 70–99)
HCT VFR BLD CALC: 27.3 % — LOW (ref 34.5–45)
HGB BLD-MCNC: 8.3 G/DL — LOW (ref 11.5–15.5)
MCHC RBC-ENTMCNC: 28.7 PG — SIGNIFICANT CHANGE UP (ref 27–34)
MCHC RBC-ENTMCNC: 30.4 GM/DL — LOW (ref 32–36)
MCV RBC AUTO: 94.5 FL — SIGNIFICANT CHANGE UP (ref 80–100)
PLATELET # BLD AUTO: 355 K/UL — SIGNIFICANT CHANGE UP (ref 150–400)
POTASSIUM SERPL-MCNC: 3.6 MMOL/L — SIGNIFICANT CHANGE UP (ref 3.5–5.3)
POTASSIUM SERPL-SCNC: 3.6 MMOL/L — SIGNIFICANT CHANGE UP (ref 3.5–5.3)
RBC # BLD: 2.89 M/UL — LOW (ref 3.8–5.2)
RBC # FLD: 16.8 % — HIGH (ref 10.3–14.5)
SODIUM SERPL-SCNC: 138 MMOL/L — SIGNIFICANT CHANGE UP (ref 135–145)
VANCOMYCIN TROUGH SERPL-MCNC: 18.5 UG/ML — SIGNIFICANT CHANGE UP (ref 10–20)
VANCOMYCIN TROUGH SERPL-MCNC: 30 UG/ML — CRITICAL HIGH (ref 10–20)
WBC # BLD: 10.54 K/UL — HIGH (ref 3.8–10.5)
WBC # FLD AUTO: 10.54 K/UL — HIGH (ref 3.8–10.5)

## 2019-03-24 RX ORDER — VANCOMYCIN HCL 1 G
750 VIAL (EA) INTRAVENOUS ONCE
Qty: 0 | Refills: 0 | Status: DISCONTINUED | OUTPATIENT
Start: 2019-03-24 | End: 2019-03-24

## 2019-03-24 RX ADMIN — Medication 137 MICROGRAM(S): at 06:02

## 2019-03-24 RX ADMIN — INSULIN GLARGINE 20 UNIT(S): 100 INJECTION, SOLUTION SUBCUTANEOUS at 22:14

## 2019-03-24 RX ADMIN — FENTANYL CITRATE 1 PATCH: 50 INJECTION INTRAVENOUS at 19:17

## 2019-03-24 RX ADMIN — Medication: at 09:29

## 2019-03-24 RX ADMIN — TRAMADOL HYDROCHLORIDE 50 MILLIGRAM(S): 50 TABLET ORAL at 19:17

## 2019-03-24 RX ADMIN — Medication 10 UNIT(S): at 13:04

## 2019-03-24 RX ADMIN — GABAPENTIN 300 MILLIGRAM(S): 400 CAPSULE ORAL at 22:19

## 2019-03-24 RX ADMIN — PRIMIDONE 25 MILLIGRAM(S): 250 TABLET ORAL at 13:09

## 2019-03-24 RX ADMIN — TRAMADOL HYDROCHLORIDE 50 MILLIGRAM(S): 50 TABLET ORAL at 06:00

## 2019-03-24 RX ADMIN — OXYCODONE HYDROCHLORIDE 10 MILLIGRAM(S): 5 TABLET ORAL at 09:55

## 2019-03-24 RX ADMIN — OXYCODONE HYDROCHLORIDE 10 MILLIGRAM(S): 5 TABLET ORAL at 19:17

## 2019-03-24 RX ADMIN — OXYCODONE HYDROCHLORIDE 10 MILLIGRAM(S): 5 TABLET ORAL at 18:55

## 2019-03-24 RX ADMIN — FENTANYL CITRATE 1 PATCH: 50 INJECTION INTRAVENOUS at 08:01

## 2019-03-24 RX ADMIN — OXYCODONE HYDROCHLORIDE 10 MILLIGRAM(S): 5 TABLET ORAL at 14:58

## 2019-03-24 RX ADMIN — MEROPENEM 100 MILLIGRAM(S): 1 INJECTION INTRAVENOUS at 18:55

## 2019-03-24 RX ADMIN — ENOXAPARIN SODIUM 40 MILLIGRAM(S): 100 INJECTION SUBCUTANEOUS at 14:33

## 2019-03-24 RX ADMIN — OXYCODONE HYDROCHLORIDE 10 MILLIGRAM(S): 5 TABLET ORAL at 01:00

## 2019-03-24 RX ADMIN — OXYCODONE HYDROCHLORIDE 10 MILLIGRAM(S): 5 TABLET ORAL at 23:52

## 2019-03-24 RX ADMIN — Medication 10 UNIT(S): at 09:28

## 2019-03-24 RX ADMIN — PANTOPRAZOLE SODIUM 40 MILLIGRAM(S): 20 TABLET, DELAYED RELEASE ORAL at 06:00

## 2019-03-24 RX ADMIN — Medication 3 MILLILITER(S): at 11:09

## 2019-03-24 RX ADMIN — Medication 3 MILLILITER(S): at 18:55

## 2019-03-24 RX ADMIN — Medication 20 MILLIGRAM(S): at 22:19

## 2019-03-24 RX ADMIN — OXYCODONE HYDROCHLORIDE 10 MILLIGRAM(S): 5 TABLET ORAL at 08:41

## 2019-03-24 RX ADMIN — Medication 10 MILLIGRAM(S): at 06:00

## 2019-03-24 RX ADMIN — OXYCODONE HYDROCHLORIDE 10 MILLIGRAM(S): 5 TABLET ORAL at 14:28

## 2019-03-24 RX ADMIN — TRAMADOL HYDROCHLORIDE 50 MILLIGRAM(S): 50 TABLET ORAL at 18:53

## 2019-03-24 RX ADMIN — Medication 250 MILLIGRAM(S): at 06:00

## 2019-03-24 RX ADMIN — GABAPENTIN 300 MILLIGRAM(S): 400 CAPSULE ORAL at 13:10

## 2019-03-24 RX ADMIN — Medication 20 MILLIGRAM(S): at 13:09

## 2019-03-24 RX ADMIN — Medication 2: at 13:03

## 2019-03-24 RX ADMIN — Medication 40 MILLIGRAM(S): at 06:00

## 2019-03-24 RX ADMIN — MEROPENEM 100 MILLIGRAM(S): 1 INJECTION INTRAVENOUS at 09:28

## 2019-03-24 RX ADMIN — Medication 3 MILLILITER(S): at 00:12

## 2019-03-24 RX ADMIN — Medication 40 MILLIGRAM(S): at 18:55

## 2019-03-24 RX ADMIN — OXYCODONE HYDROCHLORIDE 10 MILLIGRAM(S): 5 TABLET ORAL at 00:11

## 2019-03-24 RX ADMIN — GABAPENTIN 300 MILLIGRAM(S): 400 CAPSULE ORAL at 06:01

## 2019-03-24 RX ADMIN — Medication 20 MILLIGRAM(S): at 06:00

## 2019-03-24 RX ADMIN — MEROPENEM 100 MILLIGRAM(S): 1 INJECTION INTRAVENOUS at 06:00

## 2019-03-24 RX ADMIN — SIMVASTATIN 20 MILLIGRAM(S): 20 TABLET, FILM COATED ORAL at 22:19

## 2019-03-24 RX ADMIN — TRAMADOL HYDROCHLORIDE 50 MILLIGRAM(S): 50 TABLET ORAL at 07:35

## 2019-03-24 RX ADMIN — OXYCODONE HYDROCHLORIDE 10 MILLIGRAM(S): 5 TABLET ORAL at 22:58

## 2019-03-24 NOTE — PROGRESS NOTE ADULT - SUBJECTIVE AND OBJECTIVE BOX
Patient is a 63y old  Female who presents with a chief complaint of sob (24 Mar 2019 08:43)      INTERVAL HPI/OVERNIGHT EVENTS:  T(C): 36.9 (03-24-19 @ 09:02), Max: 37.3 (03-23-19 @ 23:32)  HR: 98 (03-24-19 @ 18:44) (95 - 103)  BP: 129/69 (03-24-19 @ 18:44) (114/74 - 132/97)  RR: 20 (03-24-19 @ 09:02) (20 - 22)  SpO2: 99% (03-24-19 @ 12:55) (92% - 99%)  Wt(kg): --  I&O's Summary    23 Mar 2019 07:01  -  24 Mar 2019 07:00  --------------------------------------------------------  IN: 730 mL / OUT: 750 mL / NET: -20 mL    24 Mar 2019 07:01  -  24 Mar 2019 19:09  --------------------------------------------------------  IN: 1000 mL / OUT: 1000 mL / NET: 0 mL        LABS:                        8.3    10.54 )-----------( 355      ( 24 Mar 2019 09:57 )             27.3     03-24    138  |  98  |  25<H>  ----------------------------<  149<H>  3.6   |  24  |  0.97    Ca    9.4      24 Mar 2019 07:35          CAPILLARY BLOOD GLUCOSE      POCT Blood Glucose.: 72 mg/dL (24 Mar 2019 17:20)  POCT Blood Glucose.: 69 mg/dL (24 Mar 2019 17:18)  POCT Blood Glucose.: 210 mg/dL (24 Mar 2019 12:11)  POCT Blood Glucose.: 212 mg/dL (24 Mar 2019 08:47)  POCT Blood Glucose.: 139 mg/dL (23 Mar 2019 21:56)            MEDICATIONS  (STANDING):  ALBUTerol/ipratropium for Nebulization 3 milliLiter(s) Nebulizer every 6 hours  dextrose 5%. 1000 milliLiter(s) (50 mL/Hr) IV Continuous <Continuous>  dextrose 50% Injectable 12.5 Gram(s) IV Push once  dextrose 50% Injectable 25 Gram(s) IV Push once  dextrose 50% Injectable 25 Gram(s) IV Push once  enoxaparin Injectable 40 milliGRAM(s) SubCutaneous every 24 hours  fentaNYL   Patch  25 MICROgram(s)/Hr 1 Patch Transdermal every 72 hours  furosemide    Tablet 40 milliGRAM(s) Oral two times a day  gabapentin 300 milliGRAM(s) Oral three times a day  influenza   Vaccine 0.5 milliLiter(s) IntraMuscular once  insulin glargine Injectable (LANTUS) 20 Unit(s) SubCutaneous at bedtime  insulin lispro (HumaLOG) corrective regimen sliding scale   SubCutaneous three times a day before meals  insulin lispro Injectable (HumaLOG) 10 Unit(s) SubCutaneous three times a day before meals  levothyroxine 137 MICROGram(s) Oral daily  meropenem  IVPB 1000 milliGRAM(s) IV Intermittent every 8 hours  pantoprazole    Tablet 40 milliGRAM(s) Oral before breakfast  predniSONE   Tablet 10 milliGRAM(s) Oral daily  primidone 25 milliGRAM(s) Oral daily  sildenafil (REVATIO) 20 milliGRAM(s) Oral three times a day  simvastatin 20 milliGRAM(s) Oral at bedtime  traMADol 50 milliGRAM(s) Oral two times a day  vancomycin  IVPB 750 milliGRAM(s) IV Intermittent every 12 hours    MEDICATIONS  (PRN):  dextrose 40% Gel 15 Gram(s) Oral once PRN Blood Glucose LESS THAN 70 milliGRAM(s)/deciliter  glucagon  Injectable 1 milliGRAM(s) IntraMuscular once PRN Glucose LESS THAN 70 milligrams/deciliter  ondansetron Injectable 4 milliGRAM(s) IV Push every 6 hours PRN Nausea  oxyCODONE    IR 10 milliGRAM(s) Oral every 4 hours PRN Moderate Pain (4 - 6)  sodium chloride 0.65% Nasal 1 Spray(s) Both Nostrils every 4 hours PRN Nasal Congestion          PHYSICAL EXAM:  GENERAL: NAD, well-groomed, well-developed  HEAD:  Atraumatic, Normocephalic  CHEST/LUNG: Clear to percussion bilaterally; No rales, rhonchi, wheezing, or rubs  HEART: Regular rate and rhythm; No murmurs, rubs, or gallops  ABDOMEN: Soft, Nontender, Nondistended; Bowel sounds present  EXTREMITIES:  2+ Peripheral Pulses, No clubbing, cyanosis, or edema  LYMPH: No lymphadenopathy noted  SKIN: No rashes or lesions    Care Discussed with Consultants/Other Providers [ ] YES  [ ] NO

## 2019-03-24 NOTE — PROGRESS NOTE ADULT - SUBJECTIVE AND OBJECTIVE BOX
Patient is a 63y old  Female who presents with a chief complaint of sob (23 Mar 2019 08:55)      The patient is resting comfortably this morning - no new issues overnight    MEDICATIONS  (STANDING):  ALBUTerol/ipratropium for Nebulization 3 milliLiter(s) Nebulizer every 6 hours  dextrose 5%. 1000 milliLiter(s) (50 mL/Hr) IV Continuous <Continuous>  dextrose 50% Injectable 12.5 Gram(s) IV Push once  dextrose 50% Injectable 25 Gram(s) IV Push once  dextrose 50% Injectable 25 Gram(s) IV Push once  enoxaparin Injectable 40 milliGRAM(s) SubCutaneous every 24 hours  fentaNYL   Patch  25 MICROgram(s)/Hr 1 Patch Transdermal every 72 hours  furosemide    Tablet 40 milliGRAM(s) Oral two times a day  gabapentin 300 milliGRAM(s) Oral three times a day  influenza   Vaccine 0.5 milliLiter(s) IntraMuscular once  insulin glargine Injectable (LANTUS) 20 Unit(s) SubCutaneous at bedtime  insulin lispro (HumaLOG) corrective regimen sliding scale   SubCutaneous three times a day before meals  insulin lispro Injectable (HumaLOG) 10 Unit(s) SubCutaneous three times a day before meals  levothyroxine 137 MICROGram(s) Oral daily  meropenem  IVPB 1000 milliGRAM(s) IV Intermittent every 8 hours  pantoprazole    Tablet 40 milliGRAM(s) Oral before breakfast  predniSONE   Tablet 10 milliGRAM(s) Oral daily  primidone 25 milliGRAM(s) Oral daily  sildenafil (REVATIO) 20 milliGRAM(s) Oral three times a day  simvastatin 20 milliGRAM(s) Oral at bedtime  traMADol 50 milliGRAM(s) Oral two times a day  vancomycin  IVPB 750 milliGRAM(s) IV Intermittent every 12 hours    MEDICATIONS  (PRN):  dextrose 40% Gel 15 Gram(s) Oral once PRN Blood Glucose LESS THAN 70 milliGRAM(s)/deciliter  glucagon  Injectable 1 milliGRAM(s) IntraMuscular once PRN Glucose LESS THAN 70 milligrams/deciliter  ondansetron Injectable 4 milliGRAM(s) IV Push every 6 hours PRN Nausea  oxyCODONE    IR 5 milliGRAM(s) Oral every 4 hours PRN moderate and severe pain  sodium chloride 0.65% Nasal 1 Spray(s) Both Nostrils every 4 hours PRN Nasal Congestion    Vital Signs Last 24 Hrs  T(C): 37.1 (23 Mar 2019 08:44), Max: 37.6 (22 Mar 2019 23:35)  T(F): 98.7 (23 Mar 2019 08:44), Max: 99.7 (22 Mar 2019 23:35)  HR: 116 (23 Mar 2019 08:44) (92 - 118)  BP: 151/99 (23 Mar 2019 08:44) (106/63 - 151/99)  BP(mean): --  RR: 22 (23 Mar 2019 08:44) (20 - 22)  SpO2: 95% (23 Mar 2019 08:44) (87% - 100%)    I&O's Summary    22 Mar 2019 07:01  -  23 Mar 2019 07:00  --------------------------------------------------------  IN: 1240 mL / OUT: 950 mL / NET: 290 mL          Physical Exam:   GENERAL: Obese  HEENT: KWAME/   Atraumatic, Normocephalic  ENMT: No tonsillar erythema, exudates, or enlargement; Moist mucous membranes, Good dentition, No lesions  NECK: Supple, No JVD, Normal thyroid  CHEST/LUNG: Mild wheezing  CVS: Regular rate and rhythm; No murmurs, rubs, or gallops  GI: : Soft, Nontender, Nondistended; Bowel sounds present  NERVOUS SYSTEM:  Alert & Oriented X3  EXTREMITIES:  Mild  edema  LYMPH: No lymphadenopathy noted  SKIN: No rashes or lesions  ENDOCRINOLOGY: No Thyromegaly  PSYCH: Appropriate    Labs:  24                            9.8    15.3  )-----------( 409      ( 22 Mar 2019 10:20 )             32.4                         9.9    13.3  )-----------( 412      ( 21 Mar 2019 10:41 )             30.8                         9.2    13.40 )-----------( 419      ( 19 Mar 2019 12:50 )             31.0     03-22    134<L>  |  95<L>  |  36<H>  ----------------------------<  209<H>  4.0   |  23  |  1.00  03-21    135  |  95<L>  |  41<H>  ----------------------------<  143<H>  4.6   |  21<L>  |  1.02  03-19    x   |  x   |  x   ----------------------------<  x   4.3   |  x   |  x   03-19    135  |  94<L>  |  44<H>  ----------------------------<  122<H>  6.0<H>   |  24  |  1.14    Ca    9.6      22 Mar 2019 10:20  Ca    10.0      21 Mar 2019 10:41  Mg     2.1     03-21    TPro  6.8  /  Alb  3.6  /  TBili  0.1<L>  /  DBili  x   /  AST  16  /  ALT  14  /  AlkPhos  68  03-22  TPro  7.1  /  Alb  3.7  /  TBili  0.1<L>  /  DBili  x   /  AST  27  /  ALT  17  /  AlkPhos  71  03-21    CAPILLARY BLOOD GLUCOSE      POCT Blood Glucose.: 228 mg/dL (23 Mar 2019 08:39)  POCT Blood Glucose.: 147 mg/dL (22 Mar 2019 21:57)  POCT Blood Glucose.: 170 mg/dL (22 Mar 2019 17:27)  POCT Blood Glucose.: 183 mg/dL (22 Mar 2019 12:15)      LIVER FUNCTIONS - ( 22 Mar 2019 10:20 )  Alb: 3.6 g/dL / Pro: 6.8 g/dL / ALK PHOS: 68 U/L / ALT: 14 U/L / AST: 16 U/L / GGT: x           PT/INR - ( 21 Mar 2019 10:41 )   PT: 11.1 sec;   INR: 0.97 ratio         PTT - ( 21 Mar 2019 10:41 )  PTT:22.9 sec    Serum Pro-Brain Natriuretic Peptide: 100 pg/mL (03-21 @ 10:41)        RECENT CULTURES:        RESPIRATORY CULTURES:  < from: Xray Chest 1 View- PORTABLE-Urgent (03.21.19 @ 11:18) >  EXAM:  XR CHEST PORTABLE URGENT 1V                            PROCEDURE DATE:  03/21/2019            INTERPRETATION:  A single chest x-ray was obtained on March 21, 2019.    Indication: Chest pain.    Impression:    The heart is enlarged. Improvingopacities in both lungs when compared to   previous study done on March 6, 2019. Those changes are compatible with   interstitial fluid however an infectious process cannot be ruled out   entirely. Severe degenerative changes of the right shoulder. Degenerative   changes of the thoracic spine is evident as well.                    MARISOL KEMP M.D., ATTENDING RADIOLOGIST  This document has been electronically signed. Mar 21 2019 11:28AM        < end of copied text >          Studies  Chest X-RAY  CT SCAN Chest   Venous Dopplers: LE:   CT Abdomen  Others

## 2019-03-24 NOTE — PROGRESS NOTE ADULT - ATTENDING COMMENTS
Patient seen and examined, agree with above assessment and plan as transcribed above.    - f/u echo  - Pulm f/u    Vimal Joseph MD, [facility]  BEEPER [phone]

## 2019-03-24 NOTE — PROGRESS NOTE ADULT - SUBJECTIVE AND OBJECTIVE BOX
pt seen and examined, no complaints, ROS - .     ALBUTerol/ipratropium for Nebulization 3 milliLiter(s) Nebulizer every 6 hours  dextrose 40% Gel 15 Gram(s) Oral once PRN  dextrose 5%. 1000 milliLiter(s) IV Continuous <Continuous>  dextrose 50% Injectable 12.5 Gram(s) IV Push once  dextrose 50% Injectable 25 Gram(s) IV Push once  dextrose 50% Injectable 25 Gram(s) IV Push once  enoxaparin Injectable 40 milliGRAM(s) SubCutaneous every 24 hours  fentaNYL   Patch  25 MICROgram(s)/Hr 1 Patch Transdermal every 72 hours  furosemide    Tablet 40 milliGRAM(s) Oral two times a day  gabapentin 300 milliGRAM(s) Oral three times a day  glucagon  Injectable 1 milliGRAM(s) IntraMuscular once PRN  influenza   Vaccine 0.5 milliLiter(s) IntraMuscular once  insulin glargine Injectable (LANTUS) 20 Unit(s) SubCutaneous at bedtime  insulin lispro (HumaLOG) corrective regimen sliding scale   SubCutaneous three times a day before meals  insulin lispro Injectable (HumaLOG) 10 Unit(s) SubCutaneous three times a day before meals  levothyroxine 137 MICROGram(s) Oral daily  meropenem  IVPB 1000 milliGRAM(s) IV Intermittent every 8 hours  ondansetron Injectable 4 milliGRAM(s) IV Push every 6 hours PRN  oxyCODONE    IR 10 milliGRAM(s) Oral every 4 hours PRN  pantoprazole    Tablet 40 milliGRAM(s) Oral before breakfast  predniSONE   Tablet 10 milliGRAM(s) Oral daily  primidone 25 milliGRAM(s) Oral daily  sildenafil (REVATIO) 20 milliGRAM(s) Oral three times a day  simvastatin 20 milliGRAM(s) Oral at bedtime  sodium chloride 0.65% Nasal 1 Spray(s) Both Nostrils every 4 hours PRN  traMADol 50 milliGRAM(s) Oral two times a day  vancomycin  IVPB 750 milliGRAM(s) IV Intermittent every 12 hours                            9.8    15.3  )-----------( 409      ( 22 Mar 2019 10:20 )             32.4       Hemoglobin: 9.8 g/dL (03-22 @ 10:20)  Hemoglobin: 9.9 g/dL (03-21 @ 10:41)  Hemoglobin: 9.2 g/dL (03-19 @ 12:50)      03-24    138  |  98  |  25<H>  ----------------------------<  149<H>  3.6   |  24  |  0.97    Ca    9.4      24 Mar 2019 07:35    TPro  6.8  /  Alb  3.6  /  TBili  0.1<L>  /  DBili  x   /  AST  16  /  ALT  14  /  AlkPhos  68  03-22    Creatinine Trend: 0.97<--, 1.00<--, 1.02<--, 1.14<--, 1.19<--, 1.05<--    COAGS:           T(C): 37.3 (03-23-19 @ 23:32), Max: 37.4 (03-23-19 @ 16:33)  HR: 100 (03-24-19 @ 06:34) (100 - 116)  BP: 114/74 (03-23-19 @ 23:32) (114/74 - 151/99)  RR: 22 (03-23-19 @ 23:32) (22 - 22)  SpO2: 97% (03-24-19 @ 06:34) (92% - 98%)  Wt(kg): --    I&O's Summary    23 Mar 2019 07:01  -  24 Mar 2019 07:00  --------------------------------------------------------  IN: 730 mL / OUT: 750 mL / NET: -20 mL      Gen: Appears well in NAD  HEENT:  (-)icterus (-)pallor  CV: N S1 S2 1/6 OJ (+)2 Pulses B/l  Resp:  Clear to ausculatation B/L, normal effort  GI: (+) BS Soft, NT, ND  Lymph:  (-)Edema, (-)obvious lymphadenopathy  Skin: Warm to touch, Normal turgor  Psych: Appropriate mood and affect      ECG:  	sinus Tachycardia 109 BPM, nonspecific T wave abnormality    RADIOLOGY:         CXR:  < from: Xray Chest 1 View- PORTABLE-Urgent (03.21.19 @ 11:18) >  The heart is enlarged. Improvingopacities in both lungs when compared to   previous study done on March 6, 2019. Those changes are compatible with   interstitial fluid however an infectious process cannot be ruled out   entirely. Severe degenerative changes of the right shoulder. Degenerative   changes of the thoracic spine is evident as well.    < end of copied text >      ASSESSMENT/PLAN: 	63y Female  HTN, DM2, COPD/CHRIS on nocturnal CPAP, normal LV function un echo 1/19 moderate pulmonary HTN discharged yesterday from [facility] after a prolonged hospitalization for SBO requiring lysis of adhesions, complicated by hypercapneic respiratory failure requiring intubation now being treated for HCAP..    - Abx per primary team  - Cpap per Pulm, steroids taper , cont revatio for pulm htn ,   - Echo repeat pending   -  GI / DVT prophylaxis,  keep K>4, mag >2.0   - further CV plans post echo   D/W Dr Joseph

## 2019-03-24 NOTE — PROGRESS NOTE ADULT - ASSESSMENT
63yof w/ HTN, DM2, COPD/CHRIS on nocturnal CPAP, discharged from [facility] after a prolonged hospitalization for SBO requiring lysis of adhesions, complicated by hypercapneic respiratory failure requiring intubation. Pt awoke with shortness of breath and chest tightness, was supposed to use CPAP last night but nursing facility did not have the machine. Pt oxygen dependent on 4L at baseline. Feeling better now with supplemental oxygen. No fevers or chills. Occasional cough.  originally admitted on 12/15 for SBO s/p ex lap with lysis of adhesions (4 retention sutures) c/b 12/30 she eviscerated 2/2 a coughing episode s/p ex lap (6 retention sutures) c/b multifocal pneumonia, developed hypercapnic respiratory failure requiring intubation 1/6, s/p bronch 1/9/18, and extubated on 1/24, course further c/b Enterococci bacteremia now readmitted to MICU for hypoxic respiratory failure 2/2 atelectasis requiring intubation 01/31. Had grown ESBL in urine, s/p ertapenem.  Extubated to high flow/ BIPAP.    pt was recently discharged from [facility] and now admitted here with chest pain and SOB:     The patient continues to do well as long as she continues with her BiPAP at night

## 2019-03-24 NOTE — PROVIDER CONTACT NOTE (CRITICAL VALUE NOTIFICATION) - ACTION/TREATMENT ORDERED:
Vanco dose was lowered for previous dose given. PA will contact ID for consultation. No new orders at this time.

## 2019-03-25 LAB
GLUCOSE BLDC GLUCOMTR-MCNC: 126 MG/DL — HIGH (ref 70–99)
GLUCOSE BLDC GLUCOMTR-MCNC: 150 MG/DL — HIGH (ref 70–99)
GLUCOSE BLDC GLUCOMTR-MCNC: 154 MG/DL — HIGH (ref 70–99)
GLUCOSE BLDC GLUCOMTR-MCNC: 201 MG/DL — HIGH (ref 70–99)

## 2019-03-25 PROCEDURE — 99232 SBSQ HOSP IP/OBS MODERATE 35: CPT

## 2019-03-25 RX ADMIN — OXYCODONE HYDROCHLORIDE 10 MILLIGRAM(S): 5 TABLET ORAL at 18:41

## 2019-03-25 RX ADMIN — Medication 137 MICROGRAM(S): at 06:51

## 2019-03-25 RX ADMIN — Medication 10 MILLIGRAM(S): at 06:50

## 2019-03-25 RX ADMIN — Medication 10 UNIT(S): at 19:34

## 2019-03-25 RX ADMIN — OXYCODONE HYDROCHLORIDE 10 MILLIGRAM(S): 5 TABLET ORAL at 21:26

## 2019-03-25 RX ADMIN — GABAPENTIN 300 MILLIGRAM(S): 400 CAPSULE ORAL at 06:50

## 2019-03-25 RX ADMIN — FENTANYL CITRATE 1 PATCH: 50 INJECTION INTRAVENOUS at 07:45

## 2019-03-25 RX ADMIN — Medication 20 MILLIGRAM(S): at 13:36

## 2019-03-25 RX ADMIN — FENTANYL CITRATE 1 PATCH: 50 INJECTION INTRAVENOUS at 00:19

## 2019-03-25 RX ADMIN — GABAPENTIN 300 MILLIGRAM(S): 400 CAPSULE ORAL at 13:32

## 2019-03-25 RX ADMIN — PRIMIDONE 25 MILLIGRAM(S): 250 TABLET ORAL at 12:07

## 2019-03-25 RX ADMIN — TRAMADOL HYDROCHLORIDE 50 MILLIGRAM(S): 50 TABLET ORAL at 06:50

## 2019-03-25 RX ADMIN — ENOXAPARIN SODIUM 40 MILLIGRAM(S): 100 INJECTION SUBCUTANEOUS at 13:32

## 2019-03-25 RX ADMIN — TRAMADOL HYDROCHLORIDE 50 MILLIGRAM(S): 50 TABLET ORAL at 18:18

## 2019-03-25 RX ADMIN — Medication 40 MILLIGRAM(S): at 18:14

## 2019-03-25 RX ADMIN — PANTOPRAZOLE SODIUM 40 MILLIGRAM(S): 20 TABLET, DELAYED RELEASE ORAL at 06:50

## 2019-03-25 RX ADMIN — Medication 3 MILLILITER(S): at 12:07

## 2019-03-25 RX ADMIN — MEROPENEM 100 MILLIGRAM(S): 1 INJECTION INTRAVENOUS at 18:15

## 2019-03-25 RX ADMIN — OXYCODONE HYDROCHLORIDE 10 MILLIGRAM(S): 5 TABLET ORAL at 18:11

## 2019-03-25 RX ADMIN — Medication 250 MILLIGRAM(S): at 19:11

## 2019-03-25 RX ADMIN — Medication 1: at 19:38

## 2019-03-25 RX ADMIN — Medication 10 UNIT(S): at 09:09

## 2019-03-25 RX ADMIN — Medication 3 MILLILITER(S): at 21:30

## 2019-03-25 RX ADMIN — OXYCODONE HYDROCHLORIDE 10 MILLIGRAM(S): 5 TABLET ORAL at 09:39

## 2019-03-25 RX ADMIN — Medication 10 UNIT(S): at 12:30

## 2019-03-25 RX ADMIN — TRAMADOL HYDROCHLORIDE 50 MILLIGRAM(S): 50 TABLET ORAL at 05:26

## 2019-03-25 RX ADMIN — Medication 3 MILLILITER(S): at 06:50

## 2019-03-25 RX ADMIN — FENTANYL CITRATE 1 PATCH: 50 INJECTION INTRAVENOUS at 00:18

## 2019-03-25 RX ADMIN — MEROPENEM 100 MILLIGRAM(S): 1 INJECTION INTRAVENOUS at 00:20

## 2019-03-25 RX ADMIN — OXYCODONE HYDROCHLORIDE 10 MILLIGRAM(S): 5 TABLET ORAL at 21:46

## 2019-03-25 RX ADMIN — OXYCODONE HYDROCHLORIDE 10 MILLIGRAM(S): 5 TABLET ORAL at 13:31

## 2019-03-25 RX ADMIN — OXYCODONE HYDROCHLORIDE 10 MILLIGRAM(S): 5 TABLET ORAL at 14:01

## 2019-03-25 RX ADMIN — GABAPENTIN 300 MILLIGRAM(S): 400 CAPSULE ORAL at 21:31

## 2019-03-25 RX ADMIN — Medication 3 MILLILITER(S): at 18:15

## 2019-03-25 RX ADMIN — SIMVASTATIN 20 MILLIGRAM(S): 20 TABLET, FILM COATED ORAL at 21:31

## 2019-03-25 RX ADMIN — Medication 20 MILLIGRAM(S): at 06:50

## 2019-03-25 RX ADMIN — OXYCODONE HYDROCHLORIDE 10 MILLIGRAM(S): 5 TABLET ORAL at 08:39

## 2019-03-25 RX ADMIN — Medication 2: at 12:30

## 2019-03-25 RX ADMIN — Medication 20 MILLIGRAM(S): at 22:12

## 2019-03-25 RX ADMIN — Medication 40 MILLIGRAM(S): at 06:50

## 2019-03-25 RX ADMIN — Medication 250 MILLIGRAM(S): at 06:51

## 2019-03-25 RX ADMIN — INSULIN GLARGINE 20 UNIT(S): 100 INJECTION, SOLUTION SUBCUTANEOUS at 21:30

## 2019-03-25 RX ADMIN — FENTANYL CITRATE 1 PATCH: 50 INJECTION INTRAVENOUS at 05:25

## 2019-03-25 RX ADMIN — MEROPENEM 100 MILLIGRAM(S): 1 INJECTION INTRAVENOUS at 10:38

## 2019-03-25 NOTE — CHART NOTE - NSCHARTNOTEFT_GEN_A_CORE
PA Medicine Event Note    Vanco level 18.5 ON.  Paged ID MD on call and discussed vanco dosing.  6 PM vanco was held ON. Okay to start vanco again in AM (3/25).  Ordered vanco level for next dose, 6 PM tonight.  Endorsed to AM team.    Malena Broderick PA-C  Dept of Medicine

## 2019-03-25 NOTE — PROGRESS NOTE ADULT - SUBJECTIVE AND OBJECTIVE BOX
Patient is a 63y old  Female who presents with a chief complaint of sob (25 Mar 2019 13:00)      INTERVAL HPI/OVERNIGHT EVENTS:  T(C): 37.2 (03-25-19 @ 16:00), Max: 37.2 (03-25-19 @ 16:00)  HR: 100 (03-25-19 @ 18:01) (72 - 100)  BP: 127/79 (03-25-19 @ 18:01) (106/41 - 143/86)  RR: 18 (03-25-19 @ 16:00) (18 - 18)  SpO2: 97% (03-25-19 @ 16:00) (93% - 97%)  Wt(kg): --  I&O's Summary    24 Mar 2019 07:01  -  25 Mar 2019 07:00  --------------------------------------------------------  IN: 1120 mL / OUT: 1000 mL / NET: 120 mL        LABS:                        8.3    10.54 )-----------( 355      ( 24 Mar 2019 09:57 )             27.3     03-24    138  |  98  |  25<H>  ----------------------------<  149<H>  3.6   |  24  |  0.97    Ca    9.4      24 Mar 2019 07:35          CAPILLARY BLOOD GLUCOSE      POCT Blood Glucose.: 154 mg/dL (25 Mar 2019 17:03)  POCT Blood Glucose.: 201 mg/dL (25 Mar 2019 12:24)  POCT Blood Glucose.: 150 mg/dL (25 Mar 2019 08:47)  POCT Blood Glucose.: 134 mg/dL (24 Mar 2019 21:16)  POCT Blood Glucose.: 156 mg/dL (24 Mar 2019 19:18)            MEDICATIONS  (STANDING):  ALBUTerol/ipratropium for Nebulization 3 milliLiter(s) Nebulizer every 6 hours  dextrose 5%. 1000 milliLiter(s) (50 mL/Hr) IV Continuous <Continuous>  dextrose 50% Injectable 12.5 Gram(s) IV Push once  dextrose 50% Injectable 25 Gram(s) IV Push once  dextrose 50% Injectable 25 Gram(s) IV Push once  enoxaparin Injectable 40 milliGRAM(s) SubCutaneous every 24 hours  fentaNYL   Patch  25 MICROgram(s)/Hr 1 Patch Transdermal every 72 hours  furosemide    Tablet 40 milliGRAM(s) Oral two times a day  gabapentin 300 milliGRAM(s) Oral three times a day  influenza   Vaccine 0.5 milliLiter(s) IntraMuscular once  insulin glargine Injectable (LANTUS) 20 Unit(s) SubCutaneous at bedtime  insulin lispro (HumaLOG) corrective regimen sliding scale   SubCutaneous three times a day before meals  insulin lispro Injectable (HumaLOG) 10 Unit(s) SubCutaneous three times a day before meals  levothyroxine 137 MICROGram(s) Oral daily  meropenem  IVPB 1000 milliGRAM(s) IV Intermittent every 8 hours  pantoprazole    Tablet 40 milliGRAM(s) Oral before breakfast  predniSONE   Tablet 10 milliGRAM(s) Oral daily  primidone 25 milliGRAM(s) Oral daily  sildenafil (REVATIO) 20 milliGRAM(s) Oral three times a day  simvastatin 20 milliGRAM(s) Oral at bedtime  traMADol 50 milliGRAM(s) Oral two times a day  vancomycin  IVPB 750 milliGRAM(s) IV Intermittent every 12 hours    MEDICATIONS  (PRN):  dextrose 40% Gel 15 Gram(s) Oral once PRN Blood Glucose LESS THAN 70 milliGRAM(s)/deciliter  glucagon  Injectable 1 milliGRAM(s) IntraMuscular once PRN Glucose LESS THAN 70 milligrams/deciliter  ondansetron Injectable 4 milliGRAM(s) IV Push every 6 hours PRN Nausea  oxyCODONE    IR 10 milliGRAM(s) Oral every 4 hours PRN Moderate Pain (4 - 6)  sodium chloride 0.65% Nasal 1 Spray(s) Both Nostrils every 4 hours PRN Nasal Congestion          PHYSICAL EXAM:  GENERAL: NAD, well-groomed, well-developed  HEAD:  Atraumatic, Normocephalic  CHEST/LUNG: Clear to percussion bilaterally; No rales, rhonchi, wheezing, or rubs  HEART: Regular rate and rhythm; No murmurs, rubs, or gallops  ABDOMEN: Soft, Nontender, Nondistended; Bowel sounds present  EXTREMITIES:  2+ Peripheral Pulses, No clubbing, cyanosis, or edema  LYMPH: No lymphadenopathy noted  SKIN: No rashes or lesions    Care Discussed with Consultants/Other Providers [ ] YES  [ ] NO

## 2019-03-25 NOTE — PROGRESS NOTE ADULT - SUBJECTIVE AND OBJECTIVE BOX
Patient is a 63y old  Female who presents with a chief complaint of sob (25 Mar 2019 07:52)      Any change in ROS: feels congested      MEDICATIONS  (STANDING):  ALBUTerol/ipratropium for Nebulization 3 milliLiter(s) Nebulizer every 6 hours  dextrose 5%. 1000 milliLiter(s) (50 mL/Hr) IV Continuous <Continuous>  dextrose 50% Injectable 12.5 Gram(s) IV Push once  dextrose 50% Injectable 25 Gram(s) IV Push once  dextrose 50% Injectable 25 Gram(s) IV Push once  enoxaparin Injectable 40 milliGRAM(s) SubCutaneous every 24 hours  fentaNYL   Patch  25 MICROgram(s)/Hr 1 Patch Transdermal every 72 hours  furosemide    Tablet 40 milliGRAM(s) Oral two times a day  gabapentin 300 milliGRAM(s) Oral three times a day  influenza   Vaccine 0.5 milliLiter(s) IntraMuscular once  insulin glargine Injectable (LANTUS) 20 Unit(s) SubCutaneous at bedtime  insulin lispro (HumaLOG) corrective regimen sliding scale   SubCutaneous three times a day before meals  insulin lispro Injectable (HumaLOG) 10 Unit(s) SubCutaneous three times a day before meals  levothyroxine 137 MICROGram(s) Oral daily  meropenem  IVPB 1000 milliGRAM(s) IV Intermittent every 8 hours  pantoprazole    Tablet 40 milliGRAM(s) Oral before breakfast  predniSONE   Tablet 10 milliGRAM(s) Oral daily  primidone 25 milliGRAM(s) Oral daily  sildenafil (REVATIO) 20 milliGRAM(s) Oral three times a day  simvastatin 20 milliGRAM(s) Oral at bedtime  traMADol 50 milliGRAM(s) Oral two times a day  vancomycin  IVPB 750 milliGRAM(s) IV Intermittent every 12 hours    MEDICATIONS  (PRN):  dextrose 40% Gel 15 Gram(s) Oral once PRN Blood Glucose LESS THAN 70 milliGRAM(s)/deciliter  glucagon  Injectable 1 milliGRAM(s) IntraMuscular once PRN Glucose LESS THAN 70 milligrams/deciliter  ondansetron Injectable 4 milliGRAM(s) IV Push every 6 hours PRN Nausea  oxyCODONE    IR 10 milliGRAM(s) Oral every 4 hours PRN Moderate Pain (4 - 6)  sodium chloride 0.65% Nasal 1 Spray(s) Both Nostrils every 4 hours PRN Nasal Congestion    Vital Signs Last 24 Hrs  T(C): 36.7 (25 Mar 2019 09:22), Max: 37 (25 Mar 2019 00:48)  T(F): 98.1 (25 Mar 2019 09:22), Max: 98.6 (25 Mar 2019 00:48)  HR: 78 (25 Mar 2019 10:50) (72 - 98)  BP: 143/86 (25 Mar 2019 09:22) (106/41 - 143/86)  BP(mean): --  RR: 18 (25 Mar 2019 09:22) (18 - 18)  SpO2: 95% (25 Mar 2019 10:50) (93% - 99%)    I&O's Summary    24 Mar 2019 07:01  -  25 Mar 2019 07:00  --------------------------------------------------------  IN: 1120 mL / OUT: 1000 mL / NET: 120 mL          Physical Exam:   GENERAL: Obese+  HEENT: KWAME/   Atraumatic, Normocephalic  ENMT: No tonsillar erythema, exudates, or enlargement; Moist mucous membranes, Good dentition, No lesions  NECK: Supple, No JVD, Normal thyroid  CHEST/LUNG: Clear to auscultaion  CVS: Regular rate and rhythm; No murmurs, rubs, or gallops  GI: : Soft, Nontender, Nondistended; Bowel sounds present  NERVOUS SYSTEM:  Alert & Oriented X3  EXTREMITIES:Mild  edema  LYMPH: No lymphadenopathy noted  SKIN: No rashes or lesions  ENDOCRINOLOGY: No Thyromegaly  PSYCH: Appropriate    Labs:  24                            8.3    10.54 )-----------( 355      ( 24 Mar 2019 09:57 )             27.3                         9.8    15.3  )-----------( 409      ( 22 Mar 2019 10:20 )             32.4     03-24    138  |  98  |  25<H>  ----------------------------<  149<H>  3.6   |  24  |  0.97  03-22    134<L>  |  95<L>  |  36<H>  ----------------------------<  209<H>  4.0   |  23  |  1.00    Ca    9.4      24 Mar 2019 07:35    TPro  6.8  /  Alb  3.6  /  TBili  0.1<L>  /  DBili  x   /  AST  16  /  ALT  14  /  AlkPhos  68  03-22    CAPILLARY BLOOD GLUCOSE      POCT Blood Glucose.: 150 mg/dL (25 Mar 2019 08:47)  POCT Blood Glucose.: 134 mg/dL (24 Mar 2019 21:16)  POCT Blood Glucose.: 156 mg/dL (24 Mar 2019 19:18)  POCT Blood Glucose.: 72 mg/dL (24 Mar 2019 17:20)  POCT Blood Glucose.: 69 mg/dL (24 Mar 2019 17:18)  POCT Blood Glucose.: 210 mg/dL (24 Mar 2019 12:11)      < from: Xray Chest 1 View- PORTABLE-Urgent (03.21.19 @ 11:18) >  EXAM:  XR CHEST PORTABLE URGENT 1V                            PROCEDURE DATE:  03/21/2019            INTERPRETATION:  A single chest x-ray was obtained on March 21, 2019.    Indication: Chest pain.    Impression:    The heart is enlarged. Improvingopacities in both lungs when compared to   previous study done on March 6, 2019. Those changes are compatible with   interstitial fluid however an infectious process cannot be ruled out   entirely. Severe degenerative changes of the right shoulder. Degenerative   changes of the thoracic spine is evident as well.                    MARISOL KEMP M.D., ATTENDING RADIOLOGIST  This document has been electronically signed. Mar 21 2019 11:28AM        < end of copied text >              RECENT CULTURES:  03-22 @ 18:18 .Blood Blood                No growth to date.          RESPIRATORY CULTURES:          Studies  Chest X-RAY  CT SCAN Chest   Venous Dopplers: LE:   CT Abdomen  Others

## 2019-03-25 NOTE — PROGRESS NOTE ADULT - SUBJECTIVE AND OBJECTIVE BOX
CC: Patient is a 63y old  Female who presents with a chief complaint of sob (25 Mar 2019 11:03)    ID following for PNA    Interval History/ROS: Patient states cough improved, but feels congested. Denies fever, chills.    Rest of ROS negative.    Allergies  No Known Allergies    ANTIMICROBIALS:  meropenem  IVPB 1000 every 8 hours  vancomycin  IVPB 750 every 12 hours    OTHER MEDS:  ALBUTerol/ipratropium for Nebulization 3 milliLiter(s) Nebulizer every 6 hours  dextrose 40% Gel 15 Gram(s) Oral once PRN  dextrose 5%. 1000 milliLiter(s) IV Continuous <Continuous>  dextrose 50% Injectable 12.5 Gram(s) IV Push once  dextrose 50% Injectable 25 Gram(s) IV Push once  dextrose 50% Injectable 25 Gram(s) IV Push once  enoxaparin Injectable 40 milliGRAM(s) SubCutaneous every 24 hours  fentaNYL   Patch  25 MICROgram(s)/Hr 1 Patch Transdermal every 72 hours  furosemide    Tablet 40 milliGRAM(s) Oral two times a day  gabapentin 300 milliGRAM(s) Oral three times a day  glucagon  Injectable 1 milliGRAM(s) IntraMuscular once PRN  influenza   Vaccine 0.5 milliLiter(s) IntraMuscular once  insulin glargine Injectable (LANTUS) 20 Unit(s) SubCutaneous at bedtime  insulin lispro (HumaLOG) corrective regimen sliding scale   SubCutaneous three times a day before meals  insulin lispro Injectable (HumaLOG) 10 Unit(s) SubCutaneous three times a day before meals  levothyroxine 137 MICROGram(s) Oral daily  ondansetron Injectable 4 milliGRAM(s) IV Push every 6 hours PRN  oxyCODONE    IR 10 milliGRAM(s) Oral every 4 hours PRN  pantoprazole    Tablet 40 milliGRAM(s) Oral before breakfast  predniSONE   Tablet 10 milliGRAM(s) Oral daily  primidone 25 milliGRAM(s) Oral daily  sildenafil (REVATIO) 20 milliGRAM(s) Oral three times a day  simvastatin 20 milliGRAM(s) Oral at bedtime  sodium chloride 0.65% Nasal 1 Spray(s) Both Nostrils every 4 hours PRN  traMADol 50 milliGRAM(s) Oral two times a day    PE:    Vital Signs Last 24 Hrs  T(C): 36.7 (25 Mar 2019 09:22), Max: 37 (25 Mar 2019 00:48)  T(F): 98.1 (25 Mar 2019 09:22), Max: 98.6 (25 Mar 2019 00:48)  HR: 78 (25 Mar 2019 10:50) (72 - 98)  BP: 143/86 (25 Mar 2019 09:22) (106/41 - 143/86)  BP(mean): --  RR: 18 (25 Mar 2019 09:22) (18 - 18)  SpO2: 95% (25 Mar 2019 10:50) (93% - 96%)    Gen: AOx3, NAD, non-toxic, pleasant  CV: S1+S2 normal, no murmurs, nontachycardic  Resp: Clear bilat, no resp distress, no crackles/wheezes  Abd: Soft, nontender, +BS  Ext: No LE edema, no wounds  : No Antoine  IV/Skin: No thrombophlebitis  Neuro: no focal deficits    LABS:                          8.3    10.54 )-----------( 355      ( 24 Mar 2019 09:57 )             27.3       03-24    138  |  98  |  25<H>  ----------------------------<  149<H>  3.6   |  24  |  0.97    Ca    9.4      24 Mar 2019 07:35            MICROBIOLOGY:  Vancomycin Level, Trough: 18.5 ug/mL (03-24-19 @ 19:22)  v  .Blood Blood  03-22-19   No growth to date.  --  --      URINE MIDSTREAM  03-07-19 --  --  --      BLOOD PERIPHERAL  03-06-19 --  --  --      BLOOD PERIPHERAL  03-05-19 --  --  --      BLOOD  03-02-19 --  --  --      BLOOD VENOUS  02-28-19 --  --  --      ENDOTRACHEAL SPECIMEN  02-24-19 --  --  --    Rapid RVP Result: NotDetec (03-21 @ 19:29)    RADIOLOGY:    < from: Xray Chest 1 View- PORTABLE-Urgent (03.21.19 @ 11:18) >  Impression:    The heart is enlarged. Improvingopacities in both lungs when compared to   previous study done on March 6, 2019. Those changes are compatible with   interstitial fluid however an infectious process cannot be ruled out   entirely. Severe degenerative changes of the right shoulder. Degenerative   changes of the thoracic spine is evident as well.    < end of copied text > CC: Patient is a 63y old  Female who presents with a chief complaint of sob (25 Mar 2019 11:03)    ID following for PNA    Interval History/ROS: Patient states cough improved, but feels congested. Denies fever, chills.    Rest of ROS negative.    Allergies  No Known Allergies    ANTIMICROBIALS:  meropenem  IVPB 1000 every 8 hours  vancomycin  IVPB 750 every 12 hours    OTHER MEDS:  ALBUTerol/ipratropium for Nebulization 3 milliLiter(s) Nebulizer every 6 hours  dextrose 40% Gel 15 Gram(s) Oral once PRN  dextrose 5%. 1000 milliLiter(s) IV Continuous <Continuous>  dextrose 50% Injectable 12.5 Gram(s) IV Push once  dextrose 50% Injectable 25 Gram(s) IV Push once  dextrose 50% Injectable 25 Gram(s) IV Push once  enoxaparin Injectable 40 milliGRAM(s) SubCutaneous every 24 hours  fentaNYL   Patch  25 MICROgram(s)/Hr 1 Patch Transdermal every 72 hours  furosemide    Tablet 40 milliGRAM(s) Oral two times a day  gabapentin 300 milliGRAM(s) Oral three times a day  glucagon  Injectable 1 milliGRAM(s) IntraMuscular once PRN  influenza   Vaccine 0.5 milliLiter(s) IntraMuscular once  insulin glargine Injectable (LANTUS) 20 Unit(s) SubCutaneous at bedtime  insulin lispro (HumaLOG) corrective regimen sliding scale   SubCutaneous three times a day before meals  insulin lispro Injectable (HumaLOG) 10 Unit(s) SubCutaneous three times a day before meals  levothyroxine 137 MICROGram(s) Oral daily  ondansetron Injectable 4 milliGRAM(s) IV Push every 6 hours PRN  oxyCODONE    IR 10 milliGRAM(s) Oral every 4 hours PRN  pantoprazole    Tablet 40 milliGRAM(s) Oral before breakfast  predniSONE   Tablet 10 milliGRAM(s) Oral daily  primidone 25 milliGRAM(s) Oral daily  sildenafil (REVATIO) 20 milliGRAM(s) Oral three times a day  simvastatin 20 milliGRAM(s) Oral at bedtime  sodium chloride 0.65% Nasal 1 Spray(s) Both Nostrils every 4 hours PRN  traMADol 50 milliGRAM(s) Oral two times a day    PE:    Vital Signs Last 24 Hrs  T(C): 36.7 (25 Mar 2019 09:22), Max: 37 (25 Mar 2019 00:48)  T(F): 98.1 (25 Mar 2019 09:22), Max: 98.6 (25 Mar 2019 00:48)  HR: 78 (25 Mar 2019 10:50) (72 - 98)  BP: 143/86 (25 Mar 2019 09:22) (106/41 - 143/86)  BP(mean): --  RR: 18 (25 Mar 2019 09:22) (18 - 18)  SpO2: 95% (25 Mar 2019 10:50) (93% - 96%)    Gen: AOx3, NAD  CV: S1+S2 normal, no murmurs  Resp: Clear bilat, no resp distress  Abd: Soft, nontender, +BS  Ext: No LE edema, no wounds  : No Antoine  IV/Skin: No thrombophlebitis, abd wound  Msk: No low back pain, no arthralgias, no joint swelling  Neuro: No sensory deficits, no motor deficits      LABS:                          8.3    10.54 )-----------( 355      ( 24 Mar 2019 09:57 )             27.3       03-24    138  |  98  |  25<H>  ----------------------------<  149<H>  3.6   |  24  |  0.97    Ca    9.4      24 Mar 2019 07:35            MICROBIOLOGY:  Vancomycin Level, Trough: 18.5 ug/mL (03-24-19 @ 19:22)  v  .Blood Blood  03-22-19   No growth to date.  --  --      URINE MIDSTREAM  03-07-19 --  --  --      BLOOD PERIPHERAL  03-06-19 --  --  --      BLOOD PERIPHERAL  03-05-19 --  --  --      BLOOD  03-02-19 --  --  --      BLOOD VENOUS  02-28-19 --  --  --      ENDOTRACHEAL SPECIMEN  02-24-19 --  --  --    Rapid RVP Result: NotDetec (03-21 @ 19:29)    RADIOLOGY:    < from: Xray Chest 1 View- PORTABLE-Urgent (03.21.19 @ 11:18) >  Impression:    The heart is enlarged. Improvingopacities in both lungs when compared to   previous study done on March 6, 2019. Those changes are compatible with   interstitial fluid however an infectious process cannot be ruled out   entirely. Severe degenerative changes of the right shoulder. Degenerative   changes of the thoracic spine is evident as well.    < end of copied text >

## 2019-03-25 NOTE — PROGRESS NOTE ADULT - ASSESSMENT
63 year old female with HTN, DM2, COPD/CHRIS on nocturnal CPAP, discharged yesterday from [facility] after a prolonged hospitalization for SBO requiring lysis of adhesions, complicated by hypercapneic respiratory failure requiring intubation.     No fevers or chills  Occasional cough  Afebrile, leukocytosis, tachycardic  RVP negative  CTA chest with no pulmonary embolism, but with patchy opacities/ consolidations within both lower lobes and the RML which have decreased compared to prior exam. Residual patchy opacities are still noted. New patchy groundglass opacities are noted within both upper lobes.   Suspect Pneumonia  At [facility] on a recent admission, was treated for enterococcal bacteremia which cleared on vancomycin.

## 2019-03-25 NOTE — PROGRESS NOTE ADULT - ASSESSMENT
63yof w/ HTN, DM2, COPD/CHRIS on nocturnal CPAP, discharged from [facility] after a prolonged hospitalization for SBO requiring lysis of adhesions, complicated by hypercapneic respiratory failure requiring intubation. Pt awoke with shortness of breath and chest tightness, was supposed to use CPAP last night but nursing facility did not have the machine. Pt oxygen dependent on 4L at baseline. Feeling better now with supplemental oxygen. No fevers or chills. Occasional cough.  originally admitted on 12/15 for SBO s/p ex lap with lysis of adhesions (4 retention sutures) c/b 12/30 she eviscerated 2/2 a coughing episode s/p ex lap (6 retention sutures) c/b multifocal pneumonia, developed hypercapnic respiratory failure requiring intubation 1/6, s/p bronch 1/9/18, and extubated on 1/24, course further c/b Enterococci bacteremia now readmitted to MICU for hypoxic respiratory failure 2/2 atelectasis requiring intubation 01/31. Had grown ESBL in urine, s/p ertapenem.  Extubated to high flow/ BIPAP.    pt was recently discharged from [facility] and now admitted here with chest pain and SOB:     The patient continues to do well as long as she continues with her BiPAP at night

## 2019-03-25 NOTE — PROGRESS NOTE ADULT - SUBJECTIVE AND OBJECTIVE BOX
pt seen and examined,  no chest pain or sob on exam     ALBUTerol/ipratropium for Nebulization 3 milliLiter(s) Nebulizer every 6 hours  dextrose 40% Gel 15 Gram(s) Oral once PRN  dextrose 5%. 1000 milliLiter(s) IV Continuous <Continuous>  dextrose 50% Injectable 12.5 Gram(s) IV Push once  dextrose 50% Injectable 25 Gram(s) IV Push once  dextrose 50% Injectable 25 Gram(s) IV Push once  enoxaparin Injectable 40 milliGRAM(s) SubCutaneous every 24 hours  fentaNYL   Patch  25 MICROgram(s)/Hr 1 Patch Transdermal every 72 hours  furosemide    Tablet 40 milliGRAM(s) Oral two times a day  gabapentin 300 milliGRAM(s) Oral three times a day  glucagon  Injectable 1 milliGRAM(s) IntraMuscular once PRN  influenza   Vaccine 0.5 milliLiter(s) IntraMuscular once  insulin glargine Injectable (LANTUS) 20 Unit(s) SubCutaneous at bedtime  insulin lispro (HumaLOG) corrective regimen sliding scale   SubCutaneous three times a day before meals  insulin lispro Injectable (HumaLOG) 10 Unit(s) SubCutaneous three times a day before meals  levothyroxine 137 MICROGram(s) Oral daily  meropenem  IVPB 1000 milliGRAM(s) IV Intermittent every 8 hours  ondansetron Injectable 4 milliGRAM(s) IV Push every 6 hours PRN  oxyCODONE    IR 10 milliGRAM(s) Oral every 4 hours PRN  pantoprazole    Tablet 40 milliGRAM(s) Oral before breakfast  predniSONE   Tablet 10 milliGRAM(s) Oral daily  primidone 25 milliGRAM(s) Oral daily  sildenafil (REVATIO) 20 milliGRAM(s) Oral three times a day  simvastatin 20 milliGRAM(s) Oral at bedtime  sodium chloride 0.65% Nasal 1 Spray(s) Both Nostrils every 4 hours PRN  traMADol 50 milliGRAM(s) Oral two times a day  vancomycin  IVPB 750 milliGRAM(s) IV Intermittent every 12 hours                            8.3    10.54 )-----------( 355      ( 24 Mar 2019 09:57 )             27.3       Hemoglobin: 8.3 g/dL (03-24 @ 09:57)  Hemoglobin: 9.8 g/dL (03-22 @ 10:20)  Hemoglobin: 9.9 g/dL (03-21 @ 10:41)      03-24    138  |  98  |  25<H>  ----------------------------<  149<H>  3.6   |  24  |  0.97    Ca    9.4      24 Mar 2019 07:35      Creatinine Trend: 0.97<--, 1.00<--, 1.02<--, 1.14<--, 1.19<--, 1.05<--    COAGS:           T(C): 37 (03-25-19 @ 00:48), Max: 37 (03-25-19 @ 00:48)  HR: 95 (03-25-19 @ 06:31) (88 - 101)  BP: 106/41 (03-25-19 @ 00:48) (106/41 - 132/97)  RR: 18 (03-25-19 @ 00:48) (18 - 20)  SpO2: 96% (03-25-19 @ 06:31) (93% - 99%)  Wt(kg): --    I&O's Summary    24 Mar 2019 07:01  -  25 Mar 2019 07:00  --------------------------------------------------------  IN: 1120 mL / OUT: 1000 mL / NET: 120 mL      Gen: Appears well in NAD  HEENT:  (-)icterus (-)pallor  CV: N S1 S2 1/6 OJ (+)2 Pulses B/l  Resp:  Clear to ausculatation B/L, normal effort  GI: (+) BS Soft, NT, ND  Lymph:  (-)Edema, (-)obvious lymphadenopathy  Skin: Warm to touch, Normal turgor  Psych: Appropriate mood and affect      ECG:  	sinus Tachycardia 109 BPM, nonspecific T wave abnormality    RADIOLOGY:         CXR:  < from: Xray Chest 1 View- PORTABLE-Urgent (03.21.19 @ 11:18) >  The heart is enlarged. Improvingopacities in both lungs when compared to   previous study done on March 6, 2019. Those changes are compatible with   interstitial fluid however an infectious process cannot be ruled out   entirely. Severe degenerative changes of the right shoulder. Degenerative   changes of the thoracic spine is evident as well.    < end of copied text >  ECHO:     ASSESSMENT/PLAN: 	63y Female  HTN, DM2, COPD/CHRIS on nocturnal CPAP, normal LV function un echo 1/19 moderate pulmonary HTN discharged yesterday from [facility] after a prolonged hospitalization for SBO requiring lysis of adhesions, complicated by hypercapneic respiratory failure requiring intubation now being treated for HCAP..    - cont diuretics , keep net neg   - Abx per primary team  - Cpap per Pulm, steroids taper , cont All meds for pulm htn ,   - Echo repeat pending   -  GI / DVT prophylaxis,  keep K>4, mag >2.0   - further CV plans post echo   D/W Dr Joseph

## 2019-03-26 LAB
ANION GAP SERPL CALC-SCNC: 16 MMOL/L — SIGNIFICANT CHANGE UP (ref 5–17)
BASE EXCESS BLDA CALC-SCNC: 3.9 MMOL/L — HIGH (ref -2–2)
BLD GP AB SCN SERPL QL: NEGATIVE — SIGNIFICANT CHANGE UP
BUN SERPL-MCNC: 17 MG/DL — SIGNIFICANT CHANGE UP (ref 7–23)
CALCIUM SERPL-MCNC: 9.2 MG/DL — SIGNIFICANT CHANGE UP (ref 8.4–10.5)
CHLORIDE SERPL-SCNC: 99 MMOL/L — SIGNIFICANT CHANGE UP (ref 96–108)
CO2 BLDA-SCNC: 30 MMOL/L — SIGNIFICANT CHANGE UP (ref 22–30)
CO2 SERPL-SCNC: 24 MMOL/L — SIGNIFICANT CHANGE UP (ref 22–31)
CREAT SERPL-MCNC: 0.86 MG/DL — SIGNIFICANT CHANGE UP (ref 0.5–1.3)
FERRITIN SERPL-MCNC: 203 NG/ML — HIGH (ref 15–150)
GLUCOSE BLDC GLUCOMTR-MCNC: 114 MG/DL — HIGH (ref 70–99)
GLUCOSE BLDC GLUCOMTR-MCNC: 158 MG/DL — HIGH (ref 70–99)
GLUCOSE BLDC GLUCOMTR-MCNC: 184 MG/DL — HIGH (ref 70–99)
GLUCOSE BLDC GLUCOMTR-MCNC: 230 MG/DL — HIGH (ref 70–99)
GLUCOSE SERPL-MCNC: 96 MG/DL — SIGNIFICANT CHANGE UP (ref 70–99)
HCO3 BLDA-SCNC: 28 MMOL/L — SIGNIFICANT CHANGE UP (ref 21–29)
HCT VFR BLD CALC: 25.5 % — LOW (ref 34.5–45)
HGB BLD-MCNC: 7.6 G/DL — LOW (ref 11.5–15.5)
HOROWITZ INDEX BLDA+IHG-RTO: 35 — SIGNIFICANT CHANGE UP
IRON SATN MFR SERPL: 30 UG/DL — SIGNIFICANT CHANGE UP (ref 30–160)
IRON SATN MFR SERPL: 9 % — LOW (ref 14–50)
MCHC RBC-ENTMCNC: 27.9 PG — SIGNIFICANT CHANGE UP (ref 27–34)
MCHC RBC-ENTMCNC: 29.8 GM/DL — LOW (ref 32–36)
MCV RBC AUTO: 93.8 FL — SIGNIFICANT CHANGE UP (ref 80–100)
PCO2 BLDA: 45 MMHG — SIGNIFICANT CHANGE UP (ref 32–46)
PH BLDA: 7.41 — SIGNIFICANT CHANGE UP (ref 7.35–7.45)
PLATELET # BLD AUTO: 389 K/UL — SIGNIFICANT CHANGE UP (ref 150–400)
PO2 BLDA: 72 MMHG — LOW (ref 74–108)
POTASSIUM SERPL-MCNC: 3.8 MMOL/L — SIGNIFICANT CHANGE UP (ref 3.5–5.3)
POTASSIUM SERPL-SCNC: 3.8 MMOL/L — SIGNIFICANT CHANGE UP (ref 3.5–5.3)
RBC # BLD: 2.72 M/UL — LOW (ref 3.8–5.2)
RBC # FLD: 16.8 % — HIGH (ref 10.3–14.5)
RH IG SCN BLD-IMP: POSITIVE — SIGNIFICANT CHANGE UP
RH IG SCN BLD-IMP: POSITIVE — SIGNIFICANT CHANGE UP
SAO2 % BLDA: 95 % — SIGNIFICANT CHANGE UP (ref 92–96)
SODIUM SERPL-SCNC: 139 MMOL/L — SIGNIFICANT CHANGE UP (ref 135–145)
TIBC SERPL-MCNC: 321 UG/DL — SIGNIFICANT CHANGE UP (ref 220–430)
UIBC SERPL-MCNC: 291 UG/DL — SIGNIFICANT CHANGE UP (ref 110–370)
VANCOMYCIN TROUGH SERPL-MCNC: 20 UG/ML — SIGNIFICANT CHANGE UP (ref 10–20)
WBC # BLD: 8.85 K/UL — SIGNIFICANT CHANGE UP (ref 3.8–10.5)
WBC # FLD AUTO: 8.85 K/UL — SIGNIFICANT CHANGE UP (ref 3.8–10.5)

## 2019-03-26 PROCEDURE — 93306 TTE W/DOPPLER COMPLETE: CPT | Mod: 26

## 2019-03-26 PROCEDURE — 99232 SBSQ HOSP IP/OBS MODERATE 35: CPT

## 2019-03-26 PROCEDURE — 99222 1ST HOSP IP/OBS MODERATE 55: CPT | Mod: GC

## 2019-03-26 RX ADMIN — SIMVASTATIN 20 MILLIGRAM(S): 20 TABLET, FILM COATED ORAL at 21:07

## 2019-03-26 RX ADMIN — Medication 10 UNIT(S): at 12:39

## 2019-03-26 RX ADMIN — Medication 250 MILLIGRAM(S): at 06:00

## 2019-03-26 RX ADMIN — Medication 40 MILLIGRAM(S): at 17:04

## 2019-03-26 RX ADMIN — Medication 200 MILLIGRAM(S): at 17:11

## 2019-03-26 RX ADMIN — Medication 1: at 17:04

## 2019-03-26 RX ADMIN — Medication 200 MILLIGRAM(S): at 11:23

## 2019-03-26 RX ADMIN — FENTANYL CITRATE 1 PATCH: 50 INJECTION INTRAVENOUS at 19:00

## 2019-03-26 RX ADMIN — TRAMADOL HYDROCHLORIDE 50 MILLIGRAM(S): 50 TABLET ORAL at 17:01

## 2019-03-26 RX ADMIN — MEROPENEM 100 MILLIGRAM(S): 1 INJECTION INTRAVENOUS at 17:10

## 2019-03-26 RX ADMIN — OXYCODONE HYDROCHLORIDE 10 MILLIGRAM(S): 5 TABLET ORAL at 09:27

## 2019-03-26 RX ADMIN — MEROPENEM 100 MILLIGRAM(S): 1 INJECTION INTRAVENOUS at 11:21

## 2019-03-26 RX ADMIN — Medication 137 MICROGRAM(S): at 06:01

## 2019-03-26 RX ADMIN — Medication 1: at 12:39

## 2019-03-26 RX ADMIN — Medication 10 UNIT(S): at 08:58

## 2019-03-26 RX ADMIN — TRAMADOL HYDROCHLORIDE 50 MILLIGRAM(S): 50 TABLET ORAL at 06:03

## 2019-03-26 RX ADMIN — Medication 3 MILLILITER(S): at 11:24

## 2019-03-26 RX ADMIN — Medication 10 MILLIGRAM(S): at 21:13

## 2019-03-26 RX ADMIN — Medication 20 MILLIGRAM(S): at 21:05

## 2019-03-26 RX ADMIN — Medication 3 MILLILITER(S): at 06:01

## 2019-03-26 RX ADMIN — OXYCODONE HYDROCHLORIDE 10 MILLIGRAM(S): 5 TABLET ORAL at 12:36

## 2019-03-26 RX ADMIN — INSULIN GLARGINE 20 UNIT(S): 100 INJECTION, SOLUTION SUBCUTANEOUS at 21:41

## 2019-03-26 RX ADMIN — Medication 10 MILLIGRAM(S): at 06:01

## 2019-03-26 RX ADMIN — PRIMIDONE 25 MILLIGRAM(S): 250 TABLET ORAL at 14:56

## 2019-03-26 RX ADMIN — Medication 40 MILLIGRAM(S): at 06:01

## 2019-03-26 RX ADMIN — Medication 10 UNIT(S): at 17:04

## 2019-03-26 RX ADMIN — Medication 3 MILLILITER(S): at 21:43

## 2019-03-26 RX ADMIN — Medication 20 MILLIGRAM(S): at 14:57

## 2019-03-26 RX ADMIN — GABAPENTIN 300 MILLIGRAM(S): 400 CAPSULE ORAL at 21:06

## 2019-03-26 RX ADMIN — Medication 2: at 08:57

## 2019-03-26 RX ADMIN — Medication 250 MILLIGRAM(S): at 17:01

## 2019-03-26 RX ADMIN — PANTOPRAZOLE SODIUM 40 MILLIGRAM(S): 20 TABLET, DELAYED RELEASE ORAL at 06:01

## 2019-03-26 RX ADMIN — FENTANYL CITRATE 1 PATCH: 50 INJECTION INTRAVENOUS at 08:50

## 2019-03-26 RX ADMIN — TRAMADOL HYDROCHLORIDE 50 MILLIGRAM(S): 50 TABLET ORAL at 17:45

## 2019-03-26 RX ADMIN — OXYCODONE HYDROCHLORIDE 10 MILLIGRAM(S): 5 TABLET ORAL at 21:35

## 2019-03-26 RX ADMIN — Medication 3 MILLILITER(S): at 17:05

## 2019-03-26 RX ADMIN — GABAPENTIN 300 MILLIGRAM(S): 400 CAPSULE ORAL at 06:01

## 2019-03-26 RX ADMIN — GABAPENTIN 300 MILLIGRAM(S): 400 CAPSULE ORAL at 14:57

## 2019-03-26 RX ADMIN — Medication 20 MILLIGRAM(S): at 06:03

## 2019-03-26 RX ADMIN — OXYCODONE HYDROCHLORIDE 10 MILLIGRAM(S): 5 TABLET ORAL at 17:45

## 2019-03-26 RX ADMIN — OXYCODONE HYDROCHLORIDE 10 MILLIGRAM(S): 5 TABLET ORAL at 16:56

## 2019-03-26 RX ADMIN — OXYCODONE HYDROCHLORIDE 10 MILLIGRAM(S): 5 TABLET ORAL at 08:56

## 2019-03-26 RX ADMIN — MEROPENEM 100 MILLIGRAM(S): 1 INJECTION INTRAVENOUS at 02:00

## 2019-03-26 RX ADMIN — OXYCODONE HYDROCHLORIDE 10 MILLIGRAM(S): 5 TABLET ORAL at 21:05

## 2019-03-26 RX ADMIN — OXYCODONE HYDROCHLORIDE 10 MILLIGRAM(S): 5 TABLET ORAL at 13:17

## 2019-03-26 RX ADMIN — Medication 200 MILLIGRAM(S): at 21:13

## 2019-03-26 NOTE — CONSULT NOTE ADULT - ASSESSMENT
63 F hx of RA, COPD/CHRIS on nocturnal CPAP, admitted for multilobar PNA, presents w multiple warm, painful joints  Difficult to assess synovial effusions 2/2 to body habitus, does have several tender, warm joints; likely 2/2 to RA flare  Evidence of subluxations on examination    Recommend  Increase prednisone to 20mg  Please obtain RF, CCP for baseline serologies  Will follow    Domingo Gonzalez MD PGY4  [phone]

## 2019-03-26 NOTE — PROGRESS NOTE ADULT - SUBJECTIVE AND OBJECTIVE BOX
Patient is a 63y old  Female who presents with a chief complaint of sob (26 Mar 2019 05:40)      Any change in ROS: Seems to be doing much better: son at bedside: complaining of pain generalized       MEDICATIONS  (STANDING):  ALBUTerol/ipratropium for Nebulization 3 milliLiter(s) Nebulizer every 6 hours  dextrose 5%. 1000 milliLiter(s) (50 mL/Hr) IV Continuous <Continuous>  dextrose 50% Injectable 12.5 Gram(s) IV Push once  dextrose 50% Injectable 25 Gram(s) IV Push once  dextrose 50% Injectable 25 Gram(s) IV Push once  enoxaparin Injectable 40 milliGRAM(s) SubCutaneous every 24 hours  fentaNYL   Patch  25 MICROgram(s)/Hr 1 Patch Transdermal every 72 hours  furosemide    Tablet 40 milliGRAM(s) Oral two times a day  gabapentin 300 milliGRAM(s) Oral three times a day  influenza   Vaccine 0.5 milliLiter(s) IntraMuscular once  insulin glargine Injectable (LANTUS) 20 Unit(s) SubCutaneous at bedtime  insulin lispro (HumaLOG) corrective regimen sliding scale   SubCutaneous three times a day before meals  insulin lispro Injectable (HumaLOG) 10 Unit(s) SubCutaneous three times a day before meals  levothyroxine 137 MICROGram(s) Oral daily  meropenem  IVPB 1000 milliGRAM(s) IV Intermittent every 8 hours  pantoprazole    Tablet 40 milliGRAM(s) Oral before breakfast  predniSONE   Tablet 10 milliGRAM(s) Oral daily  primidone 25 milliGRAM(s) Oral daily  sildenafil (REVATIO) 20 milliGRAM(s) Oral three times a day  simvastatin 20 milliGRAM(s) Oral at bedtime  traMADol 50 milliGRAM(s) Oral two times a day  vancomycin  IVPB 750 milliGRAM(s) IV Intermittent every 12 hours    MEDICATIONS  (PRN):  dextrose 40% Gel 15 Gram(s) Oral once PRN Blood Glucose LESS THAN 70 milliGRAM(s)/deciliter  glucagon  Injectable 1 milliGRAM(s) IntraMuscular once PRN Glucose LESS THAN 70 milligrams/deciliter  ondansetron Injectable 4 milliGRAM(s) IV Push every 6 hours PRN Nausea  oxyCODONE    IR 10 milliGRAM(s) Oral every 4 hours PRN Moderate Pain (4 - 6)  sodium chloride 0.65% Nasal 1 Spray(s) Both Nostrils every 4 hours PRN Nasal Congestion    Vital Signs Last 24 Hrs  T(C): 36.9 (26 Mar 2019 06:07), Max: 37.2 (25 Mar 2019 16:00)  T(F): 98.5 (26 Mar 2019 06:07), Max: 99 (25 Mar 2019 16:00)  HR: 90 (26 Mar 2019 06:07) (78 - 113)  BP: 136/64 (26 Mar 2019 06:07) (127/79 - 136/77)  BP(mean): --  RR: 18 (26 Mar 2019 06:07) (18 - 18)  SpO2: 97% (26 Mar 2019 06:07) (94% - 98%)    I&O's Summary        Physical Exam:   GENERAL: Obese  HEENT: KWAME/   Atraumatic, Normocephalic  ENMT: No tonsillar erythema, exudates, or enlargement; Moist mucous membranes, Good dentition, No lesions  NECK: Supple, No JVD, Normal thyroid  CHEST/LUNG: Clear to auscultaion  CVS: Regular rate and rhythm; No murmurs, rubs, or gallops  GI: : Soft, Nontender, Nondistended; Bowel sounds present  NERVOUS SYSTEM:  Alert & Oriented X3  EXTREMITIES:  + edema  LYMPH: No lymphadenopathy noted  SKIN: No rashes or lesions  ENDOCRINOLOGY: No Thyromegaly  PSYCH: Appropriate    Labs:  ABG - ( 26 Mar 2019 06:13 )  pH, Arterial: 7.41  pH, Blood: x     /  pCO2: 45    /  pO2: 72    / HCO3: 28    / Base Excess: 3.9   /  SaO2: 95              24                            7.6    8.85  )-----------( 389      ( 26 Mar 2019 08:47 )             25.5                         8.3    10.54 )-----------( 355      ( 24 Mar 2019 09:57 )             27.3     03-26    139  |  99  |  17  ----------------------------<  96  3.8   |  24  |  0.86  03-24    138  |  98  |  25<H>  ----------------------------<  149<H>  3.6   |  24  |  0.97    Ca    9.2      26 Mar 2019 07:26      CAPILLARY BLOOD GLUCOSE      POCT Blood Glucose.: 230 mg/dL (26 Mar 2019 08:57)  POCT Blood Glucose.: 126 mg/dL (25 Mar 2019 21:28)  POCT Blood Glucose.: 154 mg/dL (25 Mar 2019 17:03)  POCT Blood Glucose.: 201 mg/dL (25 Mar 2019 12:24)          < from: Xray Chest 1 View- PORTABLE-Urgent (03.21.19 @ 11:18) >        INTERPRETATION:  A single chest x-ray was obtained on March 21, 2019.    Indication: Chest pain.    Impression:    The heart is enlarged. Improvingopacities in both lungs when compared to   previous study done on March 6, 2019. Those changes are compatible with   interstitial fluid however an infectious process cannot be ruled out   entirely. Severe degenerative changes of the right shoulder. Degenerative   changes of the thoracic spine is evident as well.                    MARISOL KEMP M.D., ATTENDING RADIOLOGIST  This document has been electronically signed. Mar 21 2019 11:28AM        < end of copied text >          RECENT CULTURES:  03-22 @ 18:18 .Blood Blood                No growth to date.          RESPIRATORY CULTURES:          Studies  Chest X-RAY  CT SCAN Chest   Venous Dopplers: LE:   CT Abdomen  Others

## 2019-03-26 NOTE — PROGRESS NOTE ADULT - ASSESSMENT
63 year old female with HTN, DM2, COPD/CHRIS on nocturnal CPAP, discharged yesterday from [facility] after a prolonged hospitalization for SBO requiring lysis of adhesions, complicated by hypercapneic respiratory failure requiring intubation.     No fevers or chills  Occasional cough  Afebrile, leukocytosis, tachycardic  RVP negative  CTA chest with no pulmonary embolism, but with patchy opacities/ consolidations within both lower lobes and the RML which have decreased compared to prior exam. Residual patchy opacities are still noted. New patchy groundglass opacities are noted within both upper lobes.   Suspect Pneumonia  At [facility] on a recent admission, was treated for enterococcal bacteremia which cleared on vancomycin.

## 2019-03-26 NOTE — CONSULT NOTE ADULT - SUBJECTIVE AND OBJECTIVE BOX
AFTAB BASS  [id number]    HISTORY OF PRESENT ILLNESS:    63yof w/ HTN, DM2, COPD/CHRIS on nocturnal CPAP, discharged yesterday from [facility] after a prolonged hospitalization for SBO requiring lysis of adhesions, complicated by hypercapneic respiratory failure requiring intubation. This morning pt awoke with shortness of breath and chest tightness, was supposed to use CPAP last night but nursing facility did not have the machine. Pt oxygen dependent on 4L at baseline. Feeling better now with supplemental oxygen. No fevers or chills. Occasional cough.    PAST MEDICAL & SURGICAL HISTORY:  Diverticulosis  CVA (Cerebral Vascular Accident)  RA (Rheumatoid Arthritis)  Tooth Abscess  Arthritis  Cigarette Smoker  Chronic Asthma  Adult Hypothyroidism  Borderline Diabetes Mellitus  High Cholesterol  H/O: HTN  Wrist Disorder: S/P Surgery  History of  Section      Review of Systems:  Gen:  No fevers/chills, weight loss  HEENT: No blurry vision, no difficulty swallowing  CVS: No chest pain/palpitations  Resp: No SOB/wheezing  GI: No N/V/C/D/abdominal pain  MSK:  Skin: No new rashes  Neuro: No headaches    MEDICATIONS  (STANDING):  ALBUTerol/ipratropium for Nebulization 3 milliLiter(s) Nebulizer every 6 hours  dextrose 5%. 1000 milliLiter(s) (50 mL/Hr) IV Continuous <Continuous>  dextrose 50% Injectable 12.5 Gram(s) IV Push once  dextrose 50% Injectable 25 Gram(s) IV Push once  dextrose 50% Injectable 25 Gram(s) IV Push once  enoxaparin Injectable 40 milliGRAM(s) SubCutaneous every 24 hours  fentaNYL   Patch  25 MICROgram(s)/Hr 1 Patch Transdermal every 72 hours  furosemide    Tablet 40 milliGRAM(s) Oral two times a day  gabapentin 300 milliGRAM(s) Oral three times a day  guaiFENesin   Syrup  (Sugar-Free) 200 milliGRAM(s) Oral every 6 hours  influenza   Vaccine 0.5 milliLiter(s) IntraMuscular once  insulin glargine Injectable (LANTUS) 20 Unit(s) SubCutaneous at bedtime  insulin lispro (HumaLOG) corrective regimen sliding scale   SubCutaneous three times a day before meals  insulin lispro Injectable (HumaLOG) 10 Unit(s) SubCutaneous three times a day before meals  levothyroxine 137 MICROGram(s) Oral daily  meropenem  IVPB 1000 milliGRAM(s) IV Intermittent every 8 hours  pantoprazole    Tablet 40 milliGRAM(s) Oral before breakfast  predniSONE   Tablet 10 milliGRAM(s) Oral daily  primidone 25 milliGRAM(s) Oral daily  sildenafil (REVATIO) 20 milliGRAM(s) Oral three times a day  simvastatin 20 milliGRAM(s) Oral at bedtime  traMADol 50 milliGRAM(s) Oral two times a day  vancomycin  IVPB 750 milliGRAM(s) IV Intermittent every 12 hours    MEDICATIONS  (PRN):  dextrose 40% Gel 15 Gram(s) Oral once PRN Blood Glucose LESS THAN 70 milliGRAM(s)/deciliter  glucagon  Injectable 1 milliGRAM(s) IntraMuscular once PRN Glucose LESS THAN 70 milligrams/deciliter  ondansetron Injectable 4 milliGRAM(s) IV Push every 6 hours PRN Nausea  oxyCODONE    IR 10 milliGRAM(s) Oral every 4 hours PRN Moderate Pain (4 - 6)  sodium chloride 0.65% Nasal 1 Spray(s) Both Nostrils every 4 hours PRN Nasal Congestion      Allergies    No Known Allergies    Intolerances    Seroquel (Other)      PERTINENT MEDICATION HISTORY:    SOCIAL HISTORY:  OCCUPATION:  TRAVEL HISTORY:    FAMILY HISTORY:  No pertinent family history in first degree relatives      Vital Signs Last 24 Hrs  T(C): 36.8 (26 Mar 2019 10:40), Max: 37.2 (25 Mar 2019 16:00)  T(F): 98.2 (26 Mar 2019 10:40), Max: 99 (25 Mar 2019 16:00)  HR: 108 (26 Mar 2019 10:40) (90 - 113)  BP: 109/71 (26 Mar 2019 10:40) (109/71 - 136/77)  BP(mean): --  RR: 22 (26 Mar 2019 10:40) (18 - 22)  SpO2: 95% (26 Mar 2019 10:40) (94% - 98%)    Daily     Daily     Physical Exam:  General: No apparent distress  HEENT: EOMI, MMM  CVS: +S1/S2, RRR  Resp: CTA b/l  GI: Soft, NT/ND  MSK:    Neuro: AAOx3  muscle strength RUE LUE ; RLE LLE   Skin: no  rashes    LABS:                        7.6    8.85  )-----------( 389      ( 26 Mar 2019 08:47 )             25.5     03-    139  |  99  |  17  ----------------------------<  96  3.8   |  24  |  0.86    Ca    9.2      26 Mar 2019 07:26            RADIOLOGY & ADDITIONAL STUDIES: AFTAB BASS  [id number]    HISTORY OF PRESENT ILLNESS:    63yof w/ HTN, DM2, COPD/CHRIS on nocturnal CPAP, discharged yesterday from [facility] after a prolonged hospitalization for SBO requiring lysis of adhesions, complicated by hypercapneic respiratory failure requiring intubation. This morning pt awoke with shortness of breath and chest tightness, was supposed to use CPAP last night but nursing facility did not have the machine. Pt oxygen dependent on 4L at baseline. Feeling better now with supplemental oxygen. No fevers or chills. Occasional cough.    c/b  she eviscerated 2/2 a coughing episode s/p ex lap (6 retention sutures) c/b multifocal pneumonia, developed hypercapnic respiratory failure requiring intubation , s/p bronch 18, and extubated on , course further c/b Enterococci bacteremia now readmitted to MICU for hypoxic respiratory failure 2/2 atelectasis requiring intubation . Had grown ESBL in urine, s/p ertapenem.  Extubated to high flow/ BIPAP.      PAST MEDICAL & SURGICAL HISTORY:  Diverticulosis  CVA (Cerebral Vascular Accident)  RA (Rheumatoid Arthritis)  Tooth Abscess  Arthritis  Cigarette Smoker  Chronic Asthma  Adult Hypothyroidism  Borderline Diabetes Mellitus  High Cholesterol  H/O: HTN  Wrist Disorder: S/P Surgery  History of  Section      Review of Systems:  Gen:  No fevers/chills, weight loss  HEENT: No blurry vision, no difficulty swallowing  CVS: No chest pain/palpitations  Resp: No SOB/wheezing  GI: No N/V/C/D/abdominal pain  MSK:  Skin: No new rashes  Neuro: No headaches    MEDICATIONS  (STANDING):  ALBUTerol/ipratropium for Nebulization 3 milliLiter(s) Nebulizer every 6 hours  dextrose 5%. 1000 milliLiter(s) (50 mL/Hr) IV Continuous <Continuous>  dextrose 50% Injectable 12.5 Gram(s) IV Push once  dextrose 50% Injectable 25 Gram(s) IV Push once  dextrose 50% Injectable 25 Gram(s) IV Push once  enoxaparin Injectable 40 milliGRAM(s) SubCutaneous every 24 hours  fentaNYL   Patch  25 MICROgram(s)/Hr 1 Patch Transdermal every 72 hours  furosemide    Tablet 40 milliGRAM(s) Oral two times a day  gabapentin 300 milliGRAM(s) Oral three times a day  guaiFENesin   Syrup  (Sugar-Free) 200 milliGRAM(s) Oral every 6 hours  influenza   Vaccine 0.5 milliLiter(s) IntraMuscular once  insulin glargine Injectable (LANTUS) 20 Unit(s) SubCutaneous at bedtime  insulin lispro (HumaLOG) corrective regimen sliding scale   SubCutaneous three times a day before meals  insulin lispro Injectable (HumaLOG) 10 Unit(s) SubCutaneous three times a day before meals  levothyroxine 137 MICROGram(s) Oral daily  meropenem  IVPB 1000 milliGRAM(s) IV Intermittent every 8 hours  pantoprazole    Tablet 40 milliGRAM(s) Oral before breakfast  predniSONE   Tablet 10 milliGRAM(s) Oral daily  primidone 25 milliGRAM(s) Oral daily  sildenafil (REVATIO) 20 milliGRAM(s) Oral three times a day  simvastatin 20 milliGRAM(s) Oral at bedtime  traMADol 50 milliGRAM(s) Oral two times a day  vancomycin  IVPB 750 milliGRAM(s) IV Intermittent every 12 hours    MEDICATIONS  (PRN):  dextrose 40% Gel 15 Gram(s) Oral once PRN Blood Glucose LESS THAN 70 milliGRAM(s)/deciliter  glucagon  Injectable 1 milliGRAM(s) IntraMuscular once PRN Glucose LESS THAN 70 milligrams/deciliter  ondansetron Injectable 4 milliGRAM(s) IV Push every 6 hours PRN Nausea  oxyCODONE    IR 10 milliGRAM(s) Oral every 4 hours PRN Moderate Pain (4 - 6)  sodium chloride 0.65% Nasal 1 Spray(s) Both Nostrils every 4 hours PRN Nasal Congestion      Allergies    No Known Allergies    Intolerances    Seroquel (Other)      PERTINENT MEDICATION HISTORY:    SOCIAL HISTORY:  OCCUPATION:  TRAVEL HISTORY:    FAMILY HISTORY:  No pertinent family history in first degree relatives      Vital Signs Last 24 Hrs  T(C): 36.8 (26 Mar 2019 10:40), Max: 37.2 (25 Mar 2019 16:00)  T(F): 98.2 (26 Mar 2019 10:40), Max: 99 (25 Mar 2019 16:00)  HR: 108 (26 Mar 2019 10:40) (90 - 113)  BP: 109/71 (26 Mar 2019 10:40) (109/71 - 136/77)  BP(mean): --  RR: 22 (26 Mar 2019 10:40) (18 - 22)  SpO2: 95% (26 Mar 2019 10:40) (94% - 98%)    Daily     Daily     Physical Exam:  General: No apparent distress  HEENT: EOMI, MMM  CVS: +S1/S2, RRR  Resp: CTA b/l  GI: Soft, NT/ND  MSK:    Neuro: AAOx3  muscle strength RUE LUE ; RLE LLE   Skin: no  rashes    LABS:                        7.6    8.85  )-----------( 389      ( 26 Mar 2019 08:47 )             25.5     03-26    139  |  99  |  17  ----------------------------<  96  3.8   |  24  |  0.86    Ca    9.2      26 Mar 2019 07:26            RADIOLOGY & ADDITIONAL STUDIES: AFTAB BASS  [id number]    HISTORY OF PRESENT ILLNESS:    63 F hx of RA, COPD/CHRIS on nocturnal CPAP, admitted for multilobar PNA. Pt currently complains of multiple joint pains, swelling, stiffness. Pt feels joint pain on her elbows, wrist, hands, knees, ankles. Has been taking her home dose prednisone 10mg while in hospital. She also takes naproxen at home, but currently being held for downtrend in her Hbg. Denies any current fevers, rashes.     Pt w recent hospitalization at [facility] for SBO. Prolonged hospitalization for SBO requiring lysis of adhesions, complicated by hypercapneic respiratory failure requiring intubation, Enterococci bacteremia,  ESBL UTI.     RA hx:  Dx years ago. Pt was on gold, MTX. Did not work for patient. Pt has been on Orencia, Humira, Enbrel, but stopped 2/2 to infections. Pt subsequently has been years on prednisone 10mg, and Naproxen 500mg BID. Denies any other organ involvement of her RA. Follows w [facility] rheumatologist, Dr. Escobedo.       PAST MEDICAL & SURGICAL HISTORY:  Diverticulosis  CVA (Cerebral Vascular Accident)  RA (Rheumatoid Arthritis)  Tooth Abscess  Arthritis  Cigarette Smoker  Chronic Asthma  Adult Hypothyroidism  Borderline Diabetes Mellitus  High Cholesterol  H/O: HTN  Wrist Disorder: S/P Surgery  History of  Section      Review of Systems:  Gen:  No fevers/chills, weight loss  HEENT: No blurry vision, no difficulty swallowing  CVS: No chest pain/palpitations  Resp: No SOB/wheezing  GI: No N/V/C/D/abdominal pain  MSK: per HPI   Skin: No new rashes  Neuro: No headaches    MEDICATIONS  (STANDING):  ALBUTerol/ipratropium for Nebulization 3 milliLiter(s) Nebulizer every 6 hours  dextrose 5%. 1000 milliLiter(s) (50 mL/Hr) IV Continuous <Continuous>  dextrose 50% Injectable 12.5 Gram(s) IV Push once  dextrose 50% Injectable 25 Gram(s) IV Push once  dextrose 50% Injectable 25 Gram(s) IV Push once  enoxaparin Injectable 40 milliGRAM(s) SubCutaneous every 24 hours  fentaNYL   Patch  25 MICROgram(s)/Hr 1 Patch Transdermal every 72 hours  furosemide    Tablet 40 milliGRAM(s) Oral two times a day  gabapentin 300 milliGRAM(s) Oral three times a day  guaiFENesin   Syrup  (Sugar-Free) 200 milliGRAM(s) Oral every 6 hours  influenza   Vaccine 0.5 milliLiter(s) IntraMuscular once  insulin glargine Injectable (LANTUS) 20 Unit(s) SubCutaneous at bedtime  insulin lispro (HumaLOG) corrective regimen sliding scale   SubCutaneous three times a day before meals  insulin lispro Injectable (HumaLOG) 10 Unit(s) SubCutaneous three times a day before meals  levothyroxine 137 MICROGram(s) Oral daily  meropenem  IVPB 1000 milliGRAM(s) IV Intermittent every 8 hours  pantoprazole    Tablet 40 milliGRAM(s) Oral before breakfast  predniSONE   Tablet 10 milliGRAM(s) Oral daily  primidone 25 milliGRAM(s) Oral daily  sildenafil (REVATIO) 20 milliGRAM(s) Oral three times a day  simvastatin 20 milliGRAM(s) Oral at bedtime  traMADol 50 milliGRAM(s) Oral two times a day  vancomycin  IVPB 750 milliGRAM(s) IV Intermittent every 12 hours    MEDICATIONS  (PRN):  dextrose 40% Gel 15 Gram(s) Oral once PRN Blood Glucose LESS THAN 70 milliGRAM(s)/deciliter  glucagon  Injectable 1 milliGRAM(s) IntraMuscular once PRN Glucose LESS THAN 70 milligrams/deciliter  ondansetron Injectable 4 milliGRAM(s) IV Push every 6 hours PRN Nausea  oxyCODONE    IR 10 milliGRAM(s) Oral every 4 hours PRN Moderate Pain (4 - 6)  sodium chloride 0.65% Nasal 1 Spray(s) Both Nostrils every 4 hours PRN Nasal Congestion      Allergies    No Known Allergies    Intolerances    Seroquel (Other)      PERTINENT MEDICATION HISTORY:  Prednisone 10mg  Naproxen 500mg BID    SOCIAL HISTORY:      FAMILY HISTORY:  No pertinent family history in first degree relatives      Vital Signs Last 24 Hrs  T(C): 36.8 (26 Mar 2019 10:40), Max: 37.2 (25 Mar 2019 16:00)  T(F): 98.2 (26 Mar 2019 10:40), Max: 99 (25 Mar 2019 16:00)  HR: 108 (26 Mar 2019 10:40) (90 - 113)  BP: 109/71 (26 Mar 2019 10:40) (109/71 - 136/77)  BP(mean): --  RR: 22 (26 Mar 2019 10:40) (18 - 22)  SpO2: 95% (26 Mar 2019 10:40) (94% - 98%)    Daily     Daily     Physical Exam:  General: Obese, mild distress  HEENT: no oral ulcers  CVS: +S1/S2, RRR  Resp: CTA b/l, no w, c  GI: Soft, NT/ND  MSK: b/l elbows w warmth. R wrist w warmth. Fingers w some subluxations. R knee warmth, w pain on ROM. b/l ankles warm.   Neuro: R hand tremor  Skin: no  rashes    LABS:                        7.6    8.85  )-----------( 389      ( 26 Mar 2019 08:47 )             25.5     03-26    139  |  99  |  17  ----------------------------<  96  3.8   |  24  |  0.86    Ca    9.2      26 Mar 2019 07:26            RADIOLOGY & ADDITIONAL STUDIES:

## 2019-03-26 NOTE — PROGRESS NOTE ADULT - SUBJECTIVE AND OBJECTIVE BOX
CC: Patient is a 63y old  Female who presents with a chief complaint of sob (26 Mar 2019 10:20)    ID following for PNA    Interval History/ROS: Patient states cough is better, still with congestion. Having diffuse body aches from arthritis. Denies fever, chills.    Rest of ROS negative.    Allergies  No Known Allergies    ANTIMICROBIALS:  meropenem  IVPB 1000 every 8 hours  vancomycin  IVPB 750 every 12 hours    OTHER MEDS:  ALBUTerol/ipratropium for Nebulization 3 milliLiter(s) Nebulizer every 6 hours  dextrose 40% Gel 15 Gram(s) Oral once PRN  dextrose 5%. 1000 milliLiter(s) IV Continuous <Continuous>  dextrose 50% Injectable 12.5 Gram(s) IV Push once  dextrose 50% Injectable 25 Gram(s) IV Push once  dextrose 50% Injectable 25 Gram(s) IV Push once  enoxaparin Injectable 40 milliGRAM(s) SubCutaneous every 24 hours  fentaNYL   Patch  25 MICROgram(s)/Hr 1 Patch Transdermal every 72 hours  furosemide    Tablet 40 milliGRAM(s) Oral two times a day  gabapentin 300 milliGRAM(s) Oral three times a day  glucagon  Injectable 1 milliGRAM(s) IntraMuscular once PRN  guaiFENesin   Syrup  (Sugar-Free) 200 milliGRAM(s) Oral every 6 hours  influenza   Vaccine 0.5 milliLiter(s) IntraMuscular once  insulin glargine Injectable (LANTUS) 20 Unit(s) SubCutaneous at bedtime  insulin lispro (HumaLOG) corrective regimen sliding scale   SubCutaneous three times a day before meals  insulin lispro Injectable (HumaLOG) 10 Unit(s) SubCutaneous three times a day before meals  levothyroxine 137 MICROGram(s) Oral daily  ondansetron Injectable 4 milliGRAM(s) IV Push every 6 hours PRN  oxyCODONE    IR 10 milliGRAM(s) Oral every 4 hours PRN  pantoprazole    Tablet 40 milliGRAM(s) Oral before breakfast  predniSONE   Tablet 10 milliGRAM(s) Oral daily  primidone 25 milliGRAM(s) Oral daily  sildenafil (REVATIO) 20 milliGRAM(s) Oral three times a day  simvastatin 20 milliGRAM(s) Oral at bedtime  sodium chloride 0.65% Nasal 1 Spray(s) Both Nostrils every 4 hours PRN  traMADol 50 milliGRAM(s) Oral two times a day    PE:  Vital Signs Last 24 Hrs  T(C): 36.8 (26 Mar 2019 10:40), Max: 37.2 (25 Mar 2019 16:00)  T(F): 98.2 (26 Mar 2019 10:40), Max: 99 (25 Mar 2019 16:00)  HR: 108 (26 Mar 2019 10:40) (90 - 113)  BP: 109/71 (26 Mar 2019 10:40) (109/71 - 136/77)  BP(mean): --  RR: 22 (26 Mar 2019 10:40) (18 - 22)  SpO2: 95% (26 Mar 2019 10:40) (94% - 98%)    Gen: AOx3, NAD  CV: S1+S2 normal, no murmurs  Resp: Clear bilat, no resp distress  Abd: Soft, nontender, +BS  Ext: No LE edema, no wounds  : No Antoine  IV/Skin: No thrombophlebitis, abd wound  Msk: No low back pain, no arthralgias, no joint swelling  Neuro: No sensory deficits, no motor deficits      LABS:                          7.6    8.85  )-----------( 389      ( 26 Mar 2019 08:47 )             25.5       03-26    139  |  99  |  17  ----------------------------<  96  3.8   |  24  |  0.86    Ca    9.2      26 Mar 2019 07:26            MICROBIOLOGY:  v  .Blood Blood  03-22-19   No growth to date.  --  --      URINE MIDSTREAM  03-07-19 --  --  --      BLOOD PERIPHERAL  03-06-19 --  --  --      BLOOD PERIPHERAL  03-05-19 --  --  --      BLOOD  03-02-19 --  --  --      BLOOD VENOUS  02-28-19 --  --  --      ENDOTRACHEAL SPECIMEN  02-24-19 --  --  --    Rapid RVP Result: NotDetec (03-21 @ 19:29)    RADIOLOGY:    < from: Xray Chest 1 View- PORTABLE-Urgent (03.21.19 @ 11:18) >  Impression:    The heart is enlarged. Improvingopacities in both lungs when compared to   previous study done on March 6, 2019. Those changes are compatible with   interstitial fluid however an infectious process cannot be ruled out   entirely. Severe degenerative changes of the right shoulder. Degenerative   changes of the thoracic spine is evident as well.    < end of copied text >

## 2019-03-26 NOTE — PROGRESS NOTE ADULT - SUBJECTIVE AND OBJECTIVE BOX
Patient is a 63y old  Female who presents with a chief complaint of sob (26 Mar 2019 14:18)      INTERVAL HPI/OVERNIGHT EVENTS:  T(C): 37.1 (03-26-19 @ 19:52), Max: 37.1 (03-26-19 @ 17:25)  HR: 109 (03-26-19 @ 19:52) (90 - 113)  BP: 117/79 (03-26-19 @ 19:52) (109/71 - 143/76)  RR: 18 (03-26-19 @ 19:52) (18 - 22)  SpO2: 95% (03-26-19 @ 19:52) (94% - 98%)  Wt(kg): --  I&O's Summary    26 Mar 2019 07:01  -  26 Mar 2019 20:00  --------------------------------------------------------  IN: 300 mL / OUT: 200 mL / NET: 100 mL        LABS:                        7.6    8.85  )-----------( 389      ( 26 Mar 2019 08:47 )             25.5     03-26    139  |  99  |  17  ----------------------------<  96  3.8   |  24  |  0.86    Ca    9.2      26 Mar 2019 07:26          CAPILLARY BLOOD GLUCOSE      POCT Blood Glucose.: 158 mg/dL (26 Mar 2019 17:01)  POCT Blood Glucose.: 184 mg/dL (26 Mar 2019 12:16)  POCT Blood Glucose.: 230 mg/dL (26 Mar 2019 08:57)  POCT Blood Glucose.: 126 mg/dL (25 Mar 2019 21:28)    ABG - ( 26 Mar 2019 06:13 )  pH, Arterial: 7.41  pH, Blood: x     /  pCO2: 45    /  pO2: 72    / HCO3: 28    / Base Excess: 3.9   /  SaO2: 95                      MEDICATIONS  (STANDING):  ALBUTerol/ipratropium for Nebulization 3 milliLiter(s) Nebulizer every 6 hours  dextrose 5%. 1000 milliLiter(s) (50 mL/Hr) IV Continuous <Continuous>  dextrose 50% Injectable 12.5 Gram(s) IV Push once  dextrose 50% Injectable 25 Gram(s) IV Push once  dextrose 50% Injectable 25 Gram(s) IV Push once  enoxaparin Injectable 40 milliGRAM(s) SubCutaneous every 24 hours  fentaNYL   Patch  25 MICROgram(s)/Hr 1 Patch Transdermal every 72 hours  furosemide    Tablet 40 milliGRAM(s) Oral two times a day  gabapentin 300 milliGRAM(s) Oral three times a day  guaiFENesin   Syrup  (Sugar-Free) 200 milliGRAM(s) Oral every 6 hours  influenza   Vaccine 0.5 milliLiter(s) IntraMuscular once  insulin glargine Injectable (LANTUS) 20 Unit(s) SubCutaneous at bedtime  insulin lispro (HumaLOG) corrective regimen sliding scale   SubCutaneous three times a day before meals  insulin lispro Injectable (HumaLOG) 10 Unit(s) SubCutaneous three times a day before meals  levothyroxine 137 MICROGram(s) Oral daily  meropenem  IVPB 1000 milliGRAM(s) IV Intermittent every 8 hours  pantoprazole    Tablet 40 milliGRAM(s) Oral before breakfast  predniSONE   Tablet 10 milliGRAM(s) Oral once  primidone 25 milliGRAM(s) Oral daily  sildenafil (REVATIO) 20 milliGRAM(s) Oral three times a day  simvastatin 20 milliGRAM(s) Oral at bedtime  traMADol 50 milliGRAM(s) Oral two times a day  vancomycin  IVPB 750 milliGRAM(s) IV Intermittent every 12 hours    MEDICATIONS  (PRN):  dextrose 40% Gel 15 Gram(s) Oral once PRN Blood Glucose LESS THAN 70 milliGRAM(s)/deciliter  glucagon  Injectable 1 milliGRAM(s) IntraMuscular once PRN Glucose LESS THAN 70 milligrams/deciliter  ondansetron Injectable 4 milliGRAM(s) IV Push every 6 hours PRN Nausea  oxyCODONE    IR 10 milliGRAM(s) Oral every 4 hours PRN Moderate Pain (4 - 6)  sodium chloride 0.65% Nasal 1 Spray(s) Both Nostrils every 4 hours PRN Nasal Congestion          PHYSICAL EXAM:  GENERAL: NAD, well-groomed, well-developed  HEAD:  Atraumatic, Normocephalic  CHEST/LUNG: Clear to percussion bilaterally; No rales, rhonchi, wheezing, or rubs  HEART: Regular rate and rhythm; No murmurs, rubs, or gallops  ABDOMEN: Soft, Nontender, Nondistended; Bowel sounds present  EXTREMITIES:  2+ Peripheral Pulses, No clubbing, cyanosis, or edema  LYMPH: No lymphadenopathy noted  SKIN: No rashes or lesions    Care Discussed with Consultants/Other Providers [ ] YES  [ ] NO

## 2019-03-26 NOTE — CONSULT NOTE ADULT - ATTENDING COMMENTS
Patient seen and examined at bedside. Agree with assessment and plan as stated above. Please call [phone] for any questions or concerns

## 2019-03-26 NOTE — PROGRESS NOTE ADULT - SUBJECTIVE AND OBJECTIVE BOX
pt seen and examined,  no  events overnight     ALBUTerol/ipratropium for Nebulization 3 milliLiter(s) Nebulizer every 6 hours  dextrose 40% Gel 15 Gram(s) Oral once PRN  dextrose 5%. 1000 milliLiter(s) IV Continuous <Continuous>  dextrose 50% Injectable 12.5 Gram(s) IV Push once  dextrose 50% Injectable 25 Gram(s) IV Push once  dextrose 50% Injectable 25 Gram(s) IV Push once  enoxaparin Injectable 40 milliGRAM(s) SubCutaneous every 24 hours  fentaNYL   Patch  25 MICROgram(s)/Hr 1 Patch Transdermal every 72 hours  furosemide    Tablet 40 milliGRAM(s) Oral two times a day  gabapentin 300 milliGRAM(s) Oral three times a day  glucagon  Injectable 1 milliGRAM(s) IntraMuscular once PRN  influenza   Vaccine 0.5 milliLiter(s) IntraMuscular once  insulin glargine Injectable (LANTUS) 20 Unit(s) SubCutaneous at bedtime  insulin lispro (HumaLOG) corrective regimen sliding scale   SubCutaneous three times a day before meals  insulin lispro Injectable (HumaLOG) 10 Unit(s) SubCutaneous three times a day before meals  levothyroxine 137 MICROGram(s) Oral daily  meropenem  IVPB 1000 milliGRAM(s) IV Intermittent every 8 hours  ondansetron Injectable 4 milliGRAM(s) IV Push every 6 hours PRN  oxyCODONE    IR 10 milliGRAM(s) Oral every 4 hours PRN  pantoprazole    Tablet 40 milliGRAM(s) Oral before breakfast  predniSONE   Tablet 10 milliGRAM(s) Oral daily  primidone 25 milliGRAM(s) Oral daily  sildenafil (REVATIO) 20 milliGRAM(s) Oral three times a day  simvastatin 20 milliGRAM(s) Oral at bedtime  sodium chloride 0.65% Nasal 1 Spray(s) Both Nostrils every 4 hours PRN  traMADol 50 milliGRAM(s) Oral two times a day  vancomycin  IVPB 750 milliGRAM(s) IV Intermittent every 12 hours                            8.3    10.54 )-----------( 355      ( 24 Mar 2019 09:57 )             27.3       Hemoglobin: 8.3 g/dL (03-24 @ 09:57)  Hemoglobin: 9.8 g/dL (03-22 @ 10:20)  Hemoglobin: 9.9 g/dL (03-21 @ 10:41)      03-24    138  |  98  |  25<H>  ----------------------------<  149<H>  3.6   |  24  |  0.97    Ca    9.4      24 Mar 2019 07:35      Creatinine Trend: 0.97<--, 1.00<--, 1.02<--, 1.14<--, 1.19<--, 1.05<--    COAGS:           T(C): 37.2 (03-25-19 @ 16:00), Max: 37.2 (03-25-19 @ 16:00)  HR: 107 (03-25-19 @ 23:52) (72 - 113)  BP: 136/77 (03-25-19 @ 23:43) (127/79 - 143/86)  RR: 18 (03-25-19 @ 23:43) (18 - 18)  SpO2: 98% (03-25-19 @ 23:52) (94% - 98%)  Wt(kg): --    I&O's Summary    24 Mar 2019 07:01  -  25 Mar 2019 07:00  --------------------------------------------------------  IN: 1120 mL / OUT: 1000 mL / NET: 120 mL          Gen: Appears well in NAD  HEENT:  (-)icterus (-)pallor  CV: N S1 S2 1/6 OJ (+)2 Pulses B/l  Resp:  Clear to ausculatation B/L, normal effort  GI: (+) BS Soft, NT, ND  Lymph:  (-)Edema, (-)obvious lymphadenopathy  Skin: Warm to touch, Normal turgor  Psych: Appropriate mood and affect      ECG:  	sinus Tachycardia 109 BPM, nonspecific T wave abnormality    RADIOLOGY:         CXR:  < from: Xray Chest 1 View- PORTABLE-Urgent (03.21.19 @ 11:18) >  The heart is enlarged. Improvingopacities in both lungs when compared to   previous study done on March 6, 2019. Those changes are compatible with   interstitial fluid however an infectious process cannot be ruled out   entirely. Severe degenerative changes of the right shoulder. Degenerative   changes of the thoracic spine is evident as well.    < end of copied text >      ECHO: pending     ASSESSMENT/PLAN: 	63y Female  HTN, DM2, COPD/CHRIS on nocturnal CPAP, normal LV function un echo 1/19 moderate pulmonary HTN discharged yesterday from [facility] after a prolonged hospitalization for SBO requiring lysis of adhesions, complicated by hypercapneic respiratory failure requiring intubation now being treated for HCAP..    - cont diuretics , keep net neg   - Abx per primary team  -  GI / DVT prophylaxis,  keep K>4, mag >2.0  - Cpap per Pulm, steroids taper , cont All meds for pulm htn ,   - Echo repeat pending   - further CV plans post echo   D/W Dr Joseph

## 2019-03-26 NOTE — PROGRESS NOTE ADULT - ASSESSMENT
63yof w/ HTN, DM2, COPD/CHRIS on nocturnal CPAP, discharged from [facility] after a prolonged hospitalization for SBO requiring lysis of adhesions, complicated by hypercapneic respiratory failure requiring intubation. Pt awoke with shortness of breath and chest tightness, was supposed to use CPAP last night but nursing facility did not have the machine. Pt oxygen dependent on 4L at baseline. Feeling better now with supplemental oxygen. No fevers or chills. Occasional cough.  originally admitted on 12/15 for SBO s/p ex lap with lysis of adhesions (4 retention sutures) c/b 12/30 she eviscerated 2/2 a coughing episode s/p ex lap (6 retention sutures) c/b multifocal pneumonia, developed hypercapnic respiratory failure requiring intubation 1/6, s/p bronch 1/9/18, and extubated on 1/24, course further c/b Enterococci bacteremia now readmitted to MICU for hypoxic respiratory failure 2/2 atelectasis requiring intubation 01/31. Had grown ESBL in urine, s/p ertapenem.  Extubated to high flow/ BIPAP.    pt was recently discharged from [facility] and now admitted here with chest pain and SOB:     The patient continues to do well as long as she continues with her BiPAP at night

## 2019-03-26 NOTE — PROGRESS NOTE ADULT - ATTENDING COMMENTS
pt seesm to eb doing well from pulmonary point of view: she is on bipap : at night time: being treated fro HCAP: She does have some phlegm and wants some mucolytic: will start robitussin:

## 2019-03-27 DIAGNOSIS — D64.9 ANEMIA, UNSPECIFIED: ICD-10-CM

## 2019-03-27 DIAGNOSIS — K56.609 UNSPECIFIED INTESTINAL OBSTRUCTION, UNSPECIFIED AS TO PARTIAL VERSUS COMPLETE OBSTRUCTION: ICD-10-CM

## 2019-03-27 LAB
ANION GAP SERPL CALC-SCNC: 15 MMOL/L — SIGNIFICANT CHANGE UP (ref 5–17)
BUN SERPL-MCNC: 16 MG/DL — SIGNIFICANT CHANGE UP (ref 7–23)
CALCIUM SERPL-MCNC: 9.2 MG/DL — SIGNIFICANT CHANGE UP (ref 8.4–10.5)
CHLORIDE SERPL-SCNC: 98 MMOL/L — SIGNIFICANT CHANGE UP (ref 96–108)
CO2 SERPL-SCNC: 24 MMOL/L — SIGNIFICANT CHANGE UP (ref 22–31)
CREAT SERPL-MCNC: 0.76 MG/DL — SIGNIFICANT CHANGE UP (ref 0.5–1.3)
CULTURE RESULTS: SIGNIFICANT CHANGE UP
CULTURE RESULTS: SIGNIFICANT CHANGE UP
GLUCOSE BLDC GLUCOMTR-MCNC: 180 MG/DL — HIGH (ref 70–99)
GLUCOSE BLDC GLUCOMTR-MCNC: 202 MG/DL — HIGH (ref 70–99)
GLUCOSE BLDC GLUCOMTR-MCNC: 219 MG/DL — HIGH (ref 70–99)
GLUCOSE BLDC GLUCOMTR-MCNC: 87 MG/DL — SIGNIFICANT CHANGE UP (ref 70–99)
GLUCOSE SERPL-MCNC: 192 MG/DL — HIGH (ref 70–99)
HCT VFR BLD CALC: 31.1 % — LOW (ref 34.5–45)
HGB BLD-MCNC: 9.3 G/DL — LOW (ref 11.5–15.5)
MCHC RBC-ENTMCNC: 28 PG — SIGNIFICANT CHANGE UP (ref 27–34)
MCHC RBC-ENTMCNC: 29.9 GM/DL — LOW (ref 32–36)
MCV RBC AUTO: 93.7 FL — SIGNIFICANT CHANGE UP (ref 80–100)
PLATELET # BLD AUTO: 345 K/UL — SIGNIFICANT CHANGE UP (ref 150–400)
POTASSIUM SERPL-MCNC: 4.5 MMOL/L — SIGNIFICANT CHANGE UP (ref 3.5–5.3)
POTASSIUM SERPL-SCNC: 4.5 MMOL/L — SIGNIFICANT CHANGE UP (ref 3.5–5.3)
RBC # BLD: 3.32 M/UL — LOW (ref 3.8–5.2)
RBC # FLD: 16.4 % — HIGH (ref 10.3–14.5)
SODIUM SERPL-SCNC: 137 MMOL/L — SIGNIFICANT CHANGE UP (ref 135–145)
SPECIMEN SOURCE: SIGNIFICANT CHANGE UP
SPECIMEN SOURCE: SIGNIFICANT CHANGE UP
WBC # BLD: 9.16 K/UL — SIGNIFICANT CHANGE UP (ref 3.8–10.5)
WBC # FLD AUTO: 9.16 K/UL — SIGNIFICANT CHANGE UP (ref 3.8–10.5)

## 2019-03-27 PROCEDURE — 99231 SBSQ HOSP IP/OBS SF/LOW 25: CPT | Mod: GC

## 2019-03-27 PROCEDURE — 99232 SBSQ HOSP IP/OBS MODERATE 35: CPT

## 2019-03-27 PROCEDURE — 71045 X-RAY EXAM CHEST 1 VIEW: CPT | Mod: 26

## 2019-03-27 RX ORDER — SENNA PLUS 8.6 MG/1
2 TABLET ORAL AT BEDTIME
Qty: 0 | Refills: 0 | Status: DISCONTINUED | OUTPATIENT
Start: 2019-03-27 | End: 2019-04-09

## 2019-03-27 RX ORDER — DOCUSATE SODIUM 100 MG
100 CAPSULE ORAL THREE TIMES A DAY
Qty: 0 | Refills: 0 | Status: DISCONTINUED | OUTPATIENT
Start: 2019-03-27 | End: 2019-04-09

## 2019-03-27 RX ORDER — FUROSEMIDE 40 MG
20 TABLET ORAL ONCE
Qty: 0 | Refills: 0 | Status: COMPLETED | OUTPATIENT
Start: 2019-03-27 | End: 2019-03-27

## 2019-03-27 RX ORDER — POLYETHYLENE GLYCOL 3350 17 G/17G
17 POWDER, FOR SOLUTION ORAL
Qty: 0 | Refills: 0 | Status: DISCONTINUED | OUTPATIENT
Start: 2019-03-27 | End: 2019-04-09

## 2019-03-27 RX ORDER — ACETAMINOPHEN 500 MG
650 TABLET ORAL ONCE
Qty: 0 | Refills: 0 | Status: COMPLETED | OUTPATIENT
Start: 2019-03-27 | End: 2019-03-27

## 2019-03-27 RX ADMIN — Medication 2: at 17:24

## 2019-03-27 RX ADMIN — INSULIN GLARGINE 20 UNIT(S): 100 INJECTION, SOLUTION SUBCUTANEOUS at 21:31

## 2019-03-27 RX ADMIN — Medication 650 MILLIGRAM(S): at 23:27

## 2019-03-27 RX ADMIN — OXYCODONE HYDROCHLORIDE 10 MILLIGRAM(S): 5 TABLET ORAL at 21:26

## 2019-03-27 RX ADMIN — OXYCODONE HYDROCHLORIDE 10 MILLIGRAM(S): 5 TABLET ORAL at 14:10

## 2019-03-27 RX ADMIN — Medication 200 MILLIGRAM(S): at 18:45

## 2019-03-27 RX ADMIN — Medication 100 MILLIGRAM(S): at 13:06

## 2019-03-27 RX ADMIN — MEROPENEM 100 MILLIGRAM(S): 1 INJECTION INTRAVENOUS at 01:30

## 2019-03-27 RX ADMIN — Medication 40 MILLIGRAM(S): at 18:45

## 2019-03-27 RX ADMIN — PRIMIDONE 25 MILLIGRAM(S): 250 TABLET ORAL at 13:05

## 2019-03-27 RX ADMIN — OXYCODONE HYDROCHLORIDE 10 MILLIGRAM(S): 5 TABLET ORAL at 18:15

## 2019-03-27 RX ADMIN — Medication 2: at 09:01

## 2019-03-27 RX ADMIN — Medication 10 UNIT(S): at 13:04

## 2019-03-27 RX ADMIN — OXYCODONE HYDROCHLORIDE 10 MILLIGRAM(S): 5 TABLET ORAL at 13:20

## 2019-03-27 RX ADMIN — Medication 20 MILLIGRAM(S): at 17:01

## 2019-03-27 RX ADMIN — Medication 200 MILLIGRAM(S): at 21:31

## 2019-03-27 RX ADMIN — ENOXAPARIN SODIUM 40 MILLIGRAM(S): 100 INJECTION SUBCUTANEOUS at 13:07

## 2019-03-27 RX ADMIN — GABAPENTIN 300 MILLIGRAM(S): 400 CAPSULE ORAL at 13:06

## 2019-03-27 RX ADMIN — Medication 137 MICROGRAM(S): at 06:09

## 2019-03-27 RX ADMIN — POLYETHYLENE GLYCOL 3350 17 GRAM(S): 17 POWDER, FOR SOLUTION ORAL at 13:07

## 2019-03-27 RX ADMIN — Medication 200 MILLIGRAM(S): at 06:09

## 2019-03-27 RX ADMIN — PANTOPRAZOLE SODIUM 40 MILLIGRAM(S): 20 TABLET, DELAYED RELEASE ORAL at 06:09

## 2019-03-27 RX ADMIN — Medication 20 MILLIGRAM(S): at 13:06

## 2019-03-27 RX ADMIN — TRAMADOL HYDROCHLORIDE 50 MILLIGRAM(S): 50 TABLET ORAL at 06:18

## 2019-03-27 RX ADMIN — TRAMADOL HYDROCHLORIDE 50 MILLIGRAM(S): 50 TABLET ORAL at 21:29

## 2019-03-27 RX ADMIN — Medication 10 UNIT(S): at 17:24

## 2019-03-27 RX ADMIN — SENNA PLUS 2 TABLET(S): 8.6 TABLET ORAL at 21:33

## 2019-03-27 RX ADMIN — OXYCODONE HYDROCHLORIDE 10 MILLIGRAM(S): 5 TABLET ORAL at 09:10

## 2019-03-27 RX ADMIN — Medication 20 MILLIGRAM(S): at 06:17

## 2019-03-27 RX ADMIN — Medication 3 MILLILITER(S): at 11:52

## 2019-03-27 RX ADMIN — Medication 3 MILLILITER(S): at 21:36

## 2019-03-27 RX ADMIN — Medication 20 MILLIGRAM(S): at 21:31

## 2019-03-27 RX ADMIN — GABAPENTIN 300 MILLIGRAM(S): 400 CAPSULE ORAL at 06:09

## 2019-03-27 RX ADMIN — Medication 10 UNIT(S): at 09:01

## 2019-03-27 RX ADMIN — Medication 1: at 13:04

## 2019-03-27 RX ADMIN — TRAMADOL HYDROCHLORIDE 50 MILLIGRAM(S): 50 TABLET ORAL at 18:45

## 2019-03-27 RX ADMIN — Medication 200 MILLIGRAM(S): at 13:05

## 2019-03-27 RX ADMIN — FENTANYL CITRATE 1 PATCH: 50 INJECTION INTRAVENOUS at 19:00

## 2019-03-27 RX ADMIN — OXYCODONE HYDROCHLORIDE 10 MILLIGRAM(S): 5 TABLET ORAL at 21:56

## 2019-03-27 RX ADMIN — Medication 250 MILLIGRAM(S): at 06:17

## 2019-03-27 RX ADMIN — SIMVASTATIN 20 MILLIGRAM(S): 20 TABLET, FILM COATED ORAL at 21:31

## 2019-03-27 RX ADMIN — Medication 100 MILLIGRAM(S): at 21:33

## 2019-03-27 RX ADMIN — OXYCODONE HYDROCHLORIDE 10 MILLIGRAM(S): 5 TABLET ORAL at 10:00

## 2019-03-27 RX ADMIN — OXYCODONE HYDROCHLORIDE 10 MILLIGRAM(S): 5 TABLET ORAL at 17:25

## 2019-03-27 RX ADMIN — Medication 650 MILLIGRAM(S): at 23:57

## 2019-03-27 RX ADMIN — FENTANYL CITRATE 1 PATCH: 50 INJECTION INTRAVENOUS at 21:29

## 2019-03-27 RX ADMIN — FENTANYL CITRATE 1 PATCH: 50 INJECTION INTRAVENOUS at 21:34

## 2019-03-27 RX ADMIN — OXYCODONE HYDROCHLORIDE 10 MILLIGRAM(S): 5 TABLET ORAL at 01:26

## 2019-03-27 RX ADMIN — OXYCODONE HYDROCHLORIDE 10 MILLIGRAM(S): 5 TABLET ORAL at 02:00

## 2019-03-27 RX ADMIN — Medication 3 MILLILITER(S): at 06:09

## 2019-03-27 RX ADMIN — Medication 20 MILLIGRAM(S): at 06:08

## 2019-03-27 RX ADMIN — GABAPENTIN 300 MILLIGRAM(S): 400 CAPSULE ORAL at 21:31

## 2019-03-27 RX ADMIN — FENTANYL CITRATE 1 PATCH: 50 INJECTION INTRAVENOUS at 07:09

## 2019-03-27 RX ADMIN — Medication 3 MILLILITER(S): at 18:45

## 2019-03-27 RX ADMIN — Medication 40 MILLIGRAM(S): at 06:16

## 2019-03-27 NOTE — DIETITIAN INITIAL EVALUATION ADULT. - PERTINENT MEDS FT
lasix, humalog corrective scale insulin, humalog pre-meal insulin, deltasone, lasix, synthroid, zofran, protonix

## 2019-03-27 NOTE — CONSULT NOTE ADULT - SUBJECTIVE AND OBJECTIVE BOX
Chief Complaint:  Patient is a 63y old  Female who presents with a chief complaint of sob (27 Mar 2019 04:24)      HPI: 63yof w/ HTN, DM2, COPD/CHRIS on nocturnal CPAP, discharged yesterday from [facility] after a prolonged hospitalization for SBO requiring lysis of adhesions, complicated by hypercapneic respiratory failure requiring intubation. This morning pt awoke with shortness of breath and chest tightness, was supposed to use CPAP last night but nursing facility did not have the machine. Pt oxygen dependent on 4L at baseline. Feeling better now with supplemental oxygen. No fevers or chills. Occasional cough. GI consulted for anemia. Pt denies abdominal pain, n/v/d. She admits to constipation x 3 days, denies BRBPR/melena. Admits to colonoscopy 3 years ago which was reportedly negative.     Allergies:  No Known Allergies  Seroquel (Other)      Medications:  ALBUTerol/ipratropium for Nebulization 3 milliLiter(s) Nebulizer every 6 hours  dextrose 40% Gel 15 Gram(s) Oral once PRN  dextrose 5%. 1000 milliLiter(s) IV Continuous <Continuous>  dextrose 50% Injectable 12.5 Gram(s) IV Push once  dextrose 50% Injectable 25 Gram(s) IV Push once  dextrose 50% Injectable 25 Gram(s) IV Push once  enoxaparin Injectable 40 milliGRAM(s) SubCutaneous every 24 hours  fentaNYL   Patch  25 MICROgram(s)/Hr 1 Patch Transdermal every 72 hours  furosemide    Tablet 40 milliGRAM(s) Oral two times a day  gabapentin 300 milliGRAM(s) Oral three times a day  glucagon  Injectable 1 milliGRAM(s) IntraMuscular once PRN  guaiFENesin   Syrup  (Sugar-Free) 200 milliGRAM(s) Oral every 6 hours  influenza   Vaccine 0.5 milliLiter(s) IntraMuscular once  insulin glargine Injectable (LANTUS) 20 Unit(s) SubCutaneous at bedtime  insulin lispro (HumaLOG) corrective regimen sliding scale   SubCutaneous three times a day before meals  insulin lispro Injectable (HumaLOG) 10 Unit(s) SubCutaneous three times a day before meals  levothyroxine 137 MICROGram(s) Oral daily  meropenem  IVPB 1000 milliGRAM(s) IV Intermittent every 8 hours  ondansetron Injectable 4 milliGRAM(s) IV Push every 6 hours PRN  oxyCODONE    IR 10 milliGRAM(s) Oral every 4 hours PRN  pantoprazole    Tablet 40 milliGRAM(s) Oral before breakfast  predniSONE   Tablet 20 milliGRAM(s) Oral daily  primidone 25 milliGRAM(s) Oral daily  sildenafil (REVATIO) 20 milliGRAM(s) Oral three times a day  simvastatin 20 milliGRAM(s) Oral at bedtime  sodium chloride 0.65% Nasal 1 Spray(s) Both Nostrils every 4 hours PRN  traMADol 50 milliGRAM(s) Oral two times a day  vancomycin  IVPB 750 milliGRAM(s) IV Intermittent every 12 hours      PMHX/PSHX:  Diverticulosis  Cellulitis  CVA (Cerebral Vascular Accident)  RA (Rheumatoid Arthritis)  Tooth Abscess  Arthritis  Cigarette Smoker  Chronic Asthma  Adult Hypothyroidism  Borderline Diabetes Mellitus  High Cholesterol  H/O: HTN  Wrist Disorder  History of  Section      Family history:  No pertinent family history in first degree relatives      Social History:     ROS:     General:  No wt loss, fevers, chills, night sweats, fatigue,   Eyes:  Good vision, no reported pain  ENT:  No sore throat, pain, runny nose, dysphagia  CV:  No pain, palpitations, hypo/hypertension  Resp:  No dyspnea, cough, tachypnea, wheezing  GI:  see HPI   :  No pain, bleeding, incontinence, nocturia  Muscle:  No pain, weakness  Neuro:  No weakness, tingling, memory problems  Psych:  No fatigue, insomnia, mood problems, depression  Endocrine:  No polyuria, polydipsia, cold/heat intolerance  Heme:  No petechiae, ecchymosis, easy bruisability  Skin:  No rash, tattoos, scars, edema      PHYSICAL EXAM:   Vital Signs:  Vital Signs Last 24 Hrs  T(C): 36.9 (27 Mar 2019 08:23), Max: 37.3 (27 Mar 2019 03:00)  T(F): 98.5 (27 Mar 2019 08:23), Max: 99.1 (27 Mar 2019 03:00)  HR: 71 (27 Mar 2019 11:04) (70 - 109)  BP: 126/78 (27 Mar 2019 08:23) (111/74 - 143/76)  BP(mean): --  RR: 20 (27 Mar 2019 11:04) (18 - 20)  SpO2: 98% (27 Mar 2019 11:04) (95% - 98%)  Daily     Daily Weight in k.2 (27 Mar 2019 10:14)    GENERAL:  Appears stated age, well-groomed, well-nourished, no distress  HEENT:  NC/AT,  conjunctivae clear and pink, no thyromegaly, nodules, adenopathy, no JVD, sclera -anicteric  CHEST:  Full & symmetric excursion, no increased effort, breath sounds clear  HEART:  Regular rhythm, S1, S2, no murmur/rub/S3/S4, no abdominal bruit, no edema  ABDOMEN:  Soft, non-tender, non-distended,+ abdominal incision dressed-- c/d/i   EXTEREMITIES:  no cyanosis,clubbing or edema  SKIN:  No rash/erythema/ecchymoses/petechiae/wounds/abscess/warm/dry  NEURO:  Alert, oriented, no asterixis, no tremor, no encephalopathy    LABS:                        9.3    9.16  )-----------( 345      ( 27 Mar 2019 09:56 )             31.1     03-    137  |  98  |  16  ----------------------------<  192<H>  4.5   |  24  |  0.76    Ca    9.2      27 Mar 2019 07:18                Imaging:

## 2019-03-27 NOTE — DIETITIAN INITIAL EVALUATION ADULT. - OTHER INFO
Pt reports UBW prior to past admission of 208 lbs-pt stated she was last at this weight 11/2018 and stated elevated weight during past hospitalization was related to fluid shifts however noted admission weight 249.5 lbs (12/16/2018), pt appears to have had a 44 lb wt loss during past hospitalization, weight loss likely partially attributed to hypocaloric tube feeding in setting of morbid obesity however cannot discount unintended weight loss as pt with decreased po intake towards the end of past admission consuming </=50% of meals. Noted rehab weight 3/20 210.4 lbs, current admission weight 205.6 lbs (3/21). Pt with no food allergies, prior to previous hospitalization pt had been taking fish oil, vitamin C and tumeric. No N+V, last BM per pt 3 days ago. Pt with history of dysphagia S/P swallow evaluation 3/9/19 recommended for soft diet with thin liquids, pt currently ordered for regular textured diet, pt declined diet texture change at this time-NP informed. Pt denies difficulty chewing. In house pt reports intake has been variable dependent on food preferences, agreeable to lubna.

## 2019-03-27 NOTE — PROGRESS NOTE ADULT - SUBJECTIVE AND OBJECTIVE BOX
Patient is a 63y old  Female who presents with a chief complaint of sob (27 Mar 2019 04:24)      Any change in ROS: Feels littel SOB today : no chest pain     MEDICATIONS  (STANDING):  ALBUTerol/ipratropium for Nebulization 3 milliLiter(s) Nebulizer every 6 hours  dextrose 5%. 1000 milliLiter(s) (50 mL/Hr) IV Continuous <Continuous>  dextrose 50% Injectable 12.5 Gram(s) IV Push once  dextrose 50% Injectable 25 Gram(s) IV Push once  dextrose 50% Injectable 25 Gram(s) IV Push once  enoxaparin Injectable 40 milliGRAM(s) SubCutaneous every 24 hours  fentaNYL   Patch  25 MICROgram(s)/Hr 1 Patch Transdermal every 72 hours  furosemide    Tablet 40 milliGRAM(s) Oral two times a day  gabapentin 300 milliGRAM(s) Oral three times a day  guaiFENesin   Syrup  (Sugar-Free) 200 milliGRAM(s) Oral every 6 hours  influenza   Vaccine 0.5 milliLiter(s) IntraMuscular once  insulin glargine Injectable (LANTUS) 20 Unit(s) SubCutaneous at bedtime  insulin lispro (HumaLOG) corrective regimen sliding scale   SubCutaneous three times a day before meals  insulin lispro Injectable (HumaLOG) 10 Unit(s) SubCutaneous three times a day before meals  levothyroxine 137 MICROGram(s) Oral daily  meropenem  IVPB 1000 milliGRAM(s) IV Intermittent every 8 hours  pantoprazole    Tablet 40 milliGRAM(s) Oral before breakfast  predniSONE   Tablet 20 milliGRAM(s) Oral daily  primidone 25 milliGRAM(s) Oral daily  sildenafil (REVATIO) 20 milliGRAM(s) Oral three times a day  simvastatin 20 milliGRAM(s) Oral at bedtime  traMADol 50 milliGRAM(s) Oral two times a day  vancomycin  IVPB 750 milliGRAM(s) IV Intermittent every 12 hours    MEDICATIONS  (PRN):  dextrose 40% Gel 15 Gram(s) Oral once PRN Blood Glucose LESS THAN 70 milliGRAM(s)/deciliter  glucagon  Injectable 1 milliGRAM(s) IntraMuscular once PRN Glucose LESS THAN 70 milligrams/deciliter  ondansetron Injectable 4 milliGRAM(s) IV Push every 6 hours PRN Nausea  oxyCODONE    IR 10 milliGRAM(s) Oral every 4 hours PRN Moderate Pain (4 - 6)  sodium chloride 0.65% Nasal 1 Spray(s) Both Nostrils every 4 hours PRN Nasal Congestion    Vital Signs Last 24 Hrs  T(C): 36.9 (27 Mar 2019 08:23), Max: 37.3 (27 Mar 2019 03:00)  T(F): 98.5 (27 Mar 2019 08:23), Max: 99.1 (27 Mar 2019 03:00)  HR: 71 (27 Mar 2019 11:04) (70 - 109)  BP: 126/78 (27 Mar 2019 08:23) (111/74 - 143/76)  BP(mean): --  RR: 20 (27 Mar 2019 11:04) (18 - 20)  SpO2: 98% (27 Mar 2019 11:04) (95% - 98%)    I&O's Summary    26 Mar 2019 07:01  -  27 Mar 2019 07:00  --------------------------------------------------------  IN: 300 mL / OUT: 200 mL / NET: 100 mL    27 Mar 2019 07:01  -  27 Mar 2019 11:06  --------------------------------------------------------  IN: 200 mL / OUT: 200 mL / NET: 0 mL          Physical Exam:   GENERAL: NAD, well-groomed, well-developed  HEENT: KAWME/   Atraumatic, Normocephalic  ENMT: No tonsillar erythema, exudates, or enlargement; Moist mucous membranes, Good dentition, No lesions  NECK: Supple, No JVD, Normal thyroid  CHEST/LUNG: Clear to auscultaion  CVS: Regular rate and rhythm; No murmurs, rubs, or gallops  GI: : Soft, Nontender, Nondistended; Bowel sounds present  NERVOUS SYSTEM:  Alert & Oriented X3,  EXTREMITIES:  2+ Peripheral Pulses, No clubbing, cyanosis, or edema  LYMPH: No lymphadenopathy noted  SKIN: No rashes or lesions  ENDOCRINOLOGY: No Thyromegaly  PSYCH: Appropriate    Labs:  ABG - ( 26 Mar 2019 06:13 )  pH, Arterial: 7.41  pH, Blood: x     /  pCO2: 45    /  pO2: 72    / HCO3: 28    / Base Excess: 3.9   /  SaO2: 95              24                            9.3    9.16  )-----------( 345      ( 27 Mar 2019 09:56 )             31.1                         7.6    8.85  )-----------( 389      ( 26 Mar 2019 08:47 )             25.5                         8.3    10.54 )-----------( 355      ( 24 Mar 2019 09:57 )             27.3     03-27    137  |  98  |  16  ----------------------------<  192<H>  4.5   |  24  |  0.76  03-26    139  |  99  |  17  ----------------------------<  96  3.8   |  24  |  0.86  03-24    138  |  98  |  25<H>  ----------------------------<  149<H>  3.6   |  24  |  0.97    Ca    9.2      27 Mar 2019 07:18  Ca    9.2      26 Mar 2019 07:26      CAPILLARY BLOOD GLUCOSE      POCT Blood Glucose.: 219 mg/dL (27 Mar 2019 08:33)  POCT Blood Glucose.: 114 mg/dL (26 Mar 2019 21:12)  POCT Blood Glucose.: 158 mg/dL (26 Mar 2019 17:01)  POCT Blood Glucose.: 184 mg/dL (26 Mar 2019 12:16)        < from: Xray Chest 1 View- PORTABLE-Urgent (03.21.19 @ 11:18) >    EXAM:  XR CHEST PORTABLE URGENT 1V                            PROCEDURE DATE:  03/21/2019            INTERPRETATION:  A single chest x-ray was obtained on March 21, 2019.    Indication: Chest pain.    Impression:    The heart is enlarged. Improvingopacities in both lungs when compared to   previous study done on March 6, 2019. Those changes are compatible with   interstitial fluid however an infectious process cannot be ruled out   entirely. Severe degenerative changes of the right shoulder. Degenerative   changes of the thoracic spine is evident as well.                    MARISOL KEMP M.D., ATTENDING RADIOLOGIST  This document has been electronically signed. Mar 21 2019 11:28AM        < end of copied text >            RECENT CULTURES:  03-22 @ 18:18 .Blood Blood                No growth to date.          RESPIRATORY CULTURES:          Studies  Chest X-RAY  CT SCAN Chest   Venous Dopplers: LE:   CT Abdomen  Others

## 2019-03-27 NOTE — PROGRESS NOTE ADULT - SUBJECTIVE AND OBJECTIVE BOX
pt seen and examined,  no chest pain on exam     ALBUTerol/ipratropium for Nebulization 3 milliLiter(s) Nebulizer every 6 hours  dextrose 40% Gel 15 Gram(s) Oral once PRN  dextrose 5%. 1000 milliLiter(s) IV Continuous <Continuous>  dextrose 50% Injectable 12.5 Gram(s) IV Push once  dextrose 50% Injectable 25 Gram(s) IV Push once  dextrose 50% Injectable 25 Gram(s) IV Push once  enoxaparin Injectable 40 milliGRAM(s) SubCutaneous every 24 hours  fentaNYL   Patch  25 MICROgram(s)/Hr 1 Patch Transdermal every 72 hours  furosemide    Tablet 40 milliGRAM(s) Oral two times a day  gabapentin 300 milliGRAM(s) Oral three times a day  glucagon  Injectable 1 milliGRAM(s) IntraMuscular once PRN  guaiFENesin   Syrup  (Sugar-Free) 200 milliGRAM(s) Oral every 6 hours  influenza   Vaccine 0.5 milliLiter(s) IntraMuscular once  insulin glargine Injectable (LANTUS) 20 Unit(s) SubCutaneous at bedtime  insulin lispro (HumaLOG) corrective regimen sliding scale   SubCutaneous three times a day before meals  insulin lispro Injectable (HumaLOG) 10 Unit(s) SubCutaneous three times a day before meals  levothyroxine 137 MICROGram(s) Oral daily  meropenem  IVPB 1000 milliGRAM(s) IV Intermittent every 8 hours  ondansetron Injectable 4 milliGRAM(s) IV Push every 6 hours PRN  oxyCODONE    IR 10 milliGRAM(s) Oral every 4 hours PRN  pantoprazole    Tablet 40 milliGRAM(s) Oral before breakfast  predniSONE   Tablet 20 milliGRAM(s) Oral daily  primidone 25 milliGRAM(s) Oral daily  sildenafil (REVATIO) 20 milliGRAM(s) Oral three times a day  simvastatin 20 milliGRAM(s) Oral at bedtime  sodium chloride 0.65% Nasal 1 Spray(s) Both Nostrils every 4 hours PRN  traMADol 50 milliGRAM(s) Oral two times a day  vancomycin  IVPB 750 milliGRAM(s) IV Intermittent every 12 hours                            7.6    8.85  )-----------( 389      ( 26 Mar 2019 08:47 )             25.5       Hemoglobin: 7.6 g/dL (03-26 @ 08:47)  Hemoglobin: 8.3 g/dL (03-24 @ 09:57)  Hemoglobin: 9.8 g/dL (03-22 @ 10:20)      03-26    139  |  99  |  17  ----------------------------<  96  3.8   |  24  |  0.86    Ca    9.2      26 Mar 2019 07:26      Creatinine Trend: 0.86<--, 0.97<--, 1.00<--, 1.02<--, 1.14<--, 1.19<--    COAGS:           T(C): 37.3 (03-27-19 @ 03:00), Max: 37.3 (03-27-19 @ 03:00)  HR: 95 (03-27-19 @ 03:00) (90 - 109)  BP: 128/84 (03-27-19 @ 03:00) (109/71 - 143/76)  RR: 18 (03-27-19 @ 03:00) (18 - 22)  SpO2: 95% (03-27-19 @ 03:00) (95% - 98%)  Wt(kg): --    I&O's Summary    26 Mar 2019 07:01  -  27 Mar 2019 04:27  --------------------------------------------------------  IN: 300 mL / OUT: 200 mL / NET: 100 mL          Gen: Appears well in NAD  HEENT:  (-)icterus (-)pallor  CV: N S1 S2 1/6 OJ (+)2 Pulses B/l  Resp:  Clear to ausculatation B/L, normal effort  GI: (+) BS Soft, NT, ND  Lymph:  (-)Edema, (-)obvious lymphadenopathy  Skin: Warm to touch, Normal turgor  Psych: Appropriate mood and affect      ECG:  	sinus Tachycardia 109 BPM, nonspecific T wave abnormality    RADIOLOGY:         CXR:  < from: Xray Chest 1 View- PORTABLE-Urgent (03.21.19 @ 11:18) >  The heart is enlarged. Improvingopacities in both lungs when compared to   previous study done on March 6, 2019. Those changes are compatible with   interstitial fluid however an infectious process cannot be ruled out   entirely. Severe degenerative changes of the right shoulder. Degenerative   changes of the thoracic spine is evident as well.    < end of copied text >      ECHO: pending < from: Transthoracic Echocardiogram (03.26.19 @ 10:29) >  Conclusions:  1. Increased relative wall thickness with normal left  ventricular mass index, consistent with concentric left  ventricular remodeling.  2. Hyperdynamic left ventricular systolic function.  3. Estimated pulmonary artery systolic pressure equals 42  mm Hg, assuming right atrial pressureequals 8 mm Hg,  consistent with mild pulmonary pressures.    < end of copied text >      ASSESSMENT/PLAN: 	63y Female  HTN, DM2, COPD/CHRIS on nocturnal CPAP, normal LV function un echo 1/19 moderate pulmonary HTN discharged yesterday from [facility] after a prolonged hospitalization for SBO requiring lysis of adhesions, complicated by hypercapneic respiratory failure requiring intubation now being treated for HCAP..    - cont diuretics , keep net neg   - Abx per primary team  -  GI / DVT prophylaxis,  keep K>4, mag >2.0  - Cpap per Pulm, steroids taper ,  -  cont All meds for pulm htn ,   - repeat echo noted, no further CV work up needed at present   D/W Dr Joseph

## 2019-03-27 NOTE — PROGRESS NOTE ADULT - ATTENDING COMMENTS
Patient seen and examined at bedside. Agree with assessment and plan as stated above. Please call [phone] for any questions or concerns

## 2019-03-27 NOTE — PROGRESS NOTE ADULT - ASSESSMENT
Problem/Plan - 1:  ·  Problem: HAP (hospital-acquired pneumonia).  Plan: cw vanco and meropenam  id and pulmonary fu  will monitor.     Problem/Plan - 2:  ·  Problem: Chronic hypoxic respiratory failure , Oxygen dependent.  Plan: bipap at night   pulmonary fu.   BIPAP prn     Problem/Plan - 3:  ·  Problem: Diabetes.  Plan: monitor FS  ISS.     Problem/Plan - 4:  ·  Problem: anemia.  Plan: transfused 1 unit yesterday  check guiac  GI fu

## 2019-03-27 NOTE — PROGRESS NOTE ADULT - SUBJECTIVE AND OBJECTIVE BOX
SUBJECTIVE: Patient seen and examined at bedside. Patient reports that her joint pain has much improved since yesterday, however not entirely resolved yet. Worst pain is in her right foot and ankle. Denies ocular pain/vision changes/chest pain/shortness of breath/abdominal pain.     ROS: otherwise negative     OBJECTIVE:    VITAL SIGNS:  T(C): 36.9 (27 Mar 2019 08:23), Max: 37.3 (27 Mar 2019 03:00)  T(F): 98.5 (27 Mar 2019 08:23), Max: 99.1 (27 Mar 2019 03:00)  HR: 71 (27 Mar 2019 11:04) (70 - 109)  BP: 126/78 (27 Mar 2019 08:23) (111/74 - 143/76)  RR: 20 (27 Mar 2019 11:04) (18 - 20)  SpO2: 98% (27 Mar 2019 11:04) (95% - 98%)    03-26 @ 07:01 - 03-27 @ 07:00  --------------------------------------------------------  IN: 300 mL / OUT: 200 mL / NET: 100 mL    03-27 @ 07:01 - 03-27 @ 14:52  --------------------------------------------------------  IN: 500 mL / OUT: 200 mL / NET: 300 mL    CAPILLARY BLOOD GLUCOSE    POCT Blood Glucose.: 180 mg/dL (27 Mar 2019 12:18)    Physical Exam:  General: Obese, no acute distress.   HEENT: no oral ulcers  CVS: +S1/S2, RRR  Resp: CTA b/l, no w, c  GI: Soft, NT/ND  MSK: b/l elbows w warmth. R wrist w warmth. Fingers w some subluxations. R knee warmth, w pain on ROM. b/l ankles warm.   Neuro: All extremities tremulous, most significant in left hand    Skin: no  rashes    MEDICATIONS:  MEDICATIONS  (STANDING):  ALBUTerol/ipratropium for Nebulization 3 milliLiter(s) Nebulizer every 6 hours  dextrose 5%. 1000 milliLiter(s) (50 mL/Hr) IV Continuous <Continuous>  dextrose 50% Injectable 12.5 Gram(s) IV Push once  dextrose 50% Injectable 25 Gram(s) IV Push once  dextrose 50% Injectable 25 Gram(s) IV Push once  docusate sodium 100 milliGRAM(s) Oral three times a day  enoxaparin Injectable 40 milliGRAM(s) SubCutaneous every 24 hours  fentaNYL   Patch  25 MICROgram(s)/Hr 1 Patch Transdermal every 72 hours  furosemide    Tablet 40 milliGRAM(s) Oral two times a day  gabapentin 300 milliGRAM(s) Oral three times a day  guaiFENesin   Syrup  (Sugar-Free) 200 milliGRAM(s) Oral every 6 hours  influenza   Vaccine 0.5 milliLiter(s) IntraMuscular once  insulin glargine Injectable (LANTUS) 20 Unit(s) SubCutaneous at bedtime  insulin lispro (HumaLOG) corrective regimen sliding scale   SubCutaneous three times a day before meals  insulin lispro Injectable (HumaLOG) 10 Unit(s) SubCutaneous three times a day before meals  levothyroxine 137 MICROGram(s) Oral daily  pantoprazole    Tablet 40 milliGRAM(s) Oral before breakfast  polyethylene glycol 3350 17 Gram(s) Oral two times a day  predniSONE   Tablet 20 milliGRAM(s) Oral daily  primidone 25 milliGRAM(s) Oral daily  senna 2 Tablet(s) Oral at bedtime  sildenafil (REVATIO) 20 milliGRAM(s) Oral three times a day  simvastatin 20 milliGRAM(s) Oral at bedtime  traMADol 50 milliGRAM(s) Oral two times a day    MEDICATIONS  (PRN):  dextrose 40% Gel 15 Gram(s) Oral once PRN Blood Glucose LESS THAN 70 milliGRAM(s)/deciliter  glucagon  Injectable 1 milliGRAM(s) IntraMuscular once PRN Glucose LESS THAN 70 milligrams/deciliter  ondansetron Injectable 4 milliGRAM(s) IV Push every 6 hours PRN Nausea  oxyCODONE    IR 10 milliGRAM(s) Oral every 4 hours PRN Moderate Pain (4 - 6)  sodium chloride 0.65% Nasal 1 Spray(s) Both Nostrils every 4 hours PRN Nasal Congestion      ALLERGIES:  Allergies    No Known Allergies    Intolerances    Seroquel (Other)      LABS:                        9.3    9.16  )-----------( 345      ( 27 Mar 2019 09:56 )             31.1     03-27    137  |  98  |  16  ----------------------------<  192<H>  4.5   |  24  |  0.76    Ca    9.2      27 Mar 2019 07:18            RADIOLOGY & ADDITIONAL TESTS: Reviewed. SUBJECTIVE: Patient seen and examined at bedside. Patient reports that her joint pain has much improved since yesterday, however not entirely resolved yet. Worst pain is in her right foot and ankle. Denies ocular pain/vision changes/chest pain/shortness of breath/abdominal pain. Patient reports new right sided foot drop that began a few weeks ago after she "woke up from coma'     ROS: otherwise negative     OBJECTIVE:    VITAL SIGNS:  T(C): 36.9 (27 Mar 2019 08:23), Max: 37.3 (27 Mar 2019 03:00)  T(F): 98.5 (27 Mar 2019 08:23), Max: 99.1 (27 Mar 2019 03:00)  HR: 71 (27 Mar 2019 11:04) (70 - 109)  BP: 126/78 (27 Mar 2019 08:23) (111/74 - 143/76)  RR: 20 (27 Mar 2019 11:04) (18 - 20)  SpO2: 98% (27 Mar 2019 11:04) (95% - 98%)    03-26 @ 07:01 - 03-27 @ 07:00  --------------------------------------------------------  IN: 300 mL / OUT: 200 mL / NET: 100 mL    03-27 @ 07:01 - 03-27 @ 14:52  --------------------------------------------------------  IN: 500 mL / OUT: 200 mL / NET: 300 mL    CAPILLARY BLOOD GLUCOSE    POCT Blood Glucose.: 180 mg/dL (27 Mar 2019 12:18)    Physical Exam:  General: Obese, no acute distress.   HEENT: no oral ulcers  CVS: +S1/S2, RRR  Resp: CTA b/l, no w, c  GI: Soft, NT/ND  MSK: b/l elbows w warmth. R wrist w warmth. Fingers w some subluxations. R knee warmth, w pain on ROM. b/l ankles warm. Patient's right ankle unable to dorsiflex.   Neuro: All extremities tremulous, most significant in left hand    Skin: no  rashes    MEDICATIONS:  MEDICATIONS  (STANDING):  ALBUTerol/ipratropium for Nebulization 3 milliLiter(s) Nebulizer every 6 hours  dextrose 5%. 1000 milliLiter(s) (50 mL/Hr) IV Continuous <Continuous>  dextrose 50% Injectable 12.5 Gram(s) IV Push once  dextrose 50% Injectable 25 Gram(s) IV Push once  dextrose 50% Injectable 25 Gram(s) IV Push once  docusate sodium 100 milliGRAM(s) Oral three times a day  enoxaparin Injectable 40 milliGRAM(s) SubCutaneous every 24 hours  fentaNYL   Patch  25 MICROgram(s)/Hr 1 Patch Transdermal every 72 hours  furosemide    Tablet 40 milliGRAM(s) Oral two times a day  gabapentin 300 milliGRAM(s) Oral three times a day  guaiFENesin   Syrup  (Sugar-Free) 200 milliGRAM(s) Oral every 6 hours  influenza   Vaccine 0.5 milliLiter(s) IntraMuscular once  insulin glargine Injectable (LANTUS) 20 Unit(s) SubCutaneous at bedtime  insulin lispro (HumaLOG) corrective regimen sliding scale   SubCutaneous three times a day before meals  insulin lispro Injectable (HumaLOG) 10 Unit(s) SubCutaneous three times a day before meals  levothyroxine 137 MICROGram(s) Oral daily  pantoprazole    Tablet 40 milliGRAM(s) Oral before breakfast  polyethylene glycol 3350 17 Gram(s) Oral two times a day  predniSONE   Tablet 20 milliGRAM(s) Oral daily  primidone 25 milliGRAM(s) Oral daily  senna 2 Tablet(s) Oral at bedtime  sildenafil (REVATIO) 20 milliGRAM(s) Oral three times a day  simvastatin 20 milliGRAM(s) Oral at bedtime  traMADol 50 milliGRAM(s) Oral two times a day    MEDICATIONS  (PRN):  dextrose 40% Gel 15 Gram(s) Oral once PRN Blood Glucose LESS THAN 70 milliGRAM(s)/deciliter  glucagon  Injectable 1 milliGRAM(s) IntraMuscular once PRN Glucose LESS THAN 70 milligrams/deciliter  ondansetron Injectable 4 milliGRAM(s) IV Push every 6 hours PRN Nausea  oxyCODONE    IR 10 milliGRAM(s) Oral every 4 hours PRN Moderate Pain (4 - 6)  sodium chloride 0.65% Nasal 1 Spray(s) Both Nostrils every 4 hours PRN Nasal Congestion    ALLERGIES:  Allergies    No Known Allergies    Intolerances    Seroquel (Other)    LABS:                        9.3    9.16  )-----------( 345      ( 27 Mar 2019 09:56 )             31.1     03-27    137  |  98  |  16  ----------------------------<  192<H>  4.5   |  24  |  0.76    Ca    9.2      27 Mar 2019 07:18    RADIOLOGY & ADDITIONAL TESTS: Reviewed.

## 2019-03-27 NOTE — PROGRESS NOTE ADULT - ATTENDING COMMENTS
Patient seen and examined.  Agree with above.   -symptoms non-anginal  -TTE with normal LV function  -no further cardiac workup needed at this time    Richard Pineda MD

## 2019-03-27 NOTE — DIETITIAN INITIAL EVALUATION ADULT. - ADHERENCE
Pt was ordered for a low salt, low fat, consistent carbohydrate diet during past hospitalization, unable to recall usual blood glucose values however HgbA1C 3/22 6.9% indicates good control.

## 2019-03-27 NOTE — PROGRESS NOTE ADULT - ASSESSMENT
63 year old female with HTN, DM2, COPD/CHRIS on nocturnal CPAP, discharged yesterday from [facility] after a prolonged hospitalization for SBO requiring lysis of adhesions, complicated by hypercapneic respiratory failure requiring intubation.     No fevers or chills  Occasional cough  Afebrile, leukocytosis, tachycardic  RVP negative  CTA chest with no pulmonary embolism, but with patchy opacities/ consolidations within both lower lobes and the RML which have decreased compared to prior exam. Residual patchy opacities are still noted. New patchy groundglass opacities are noted within both upper lobes.   Suspect Pneumonia  At [facility] on a recent admission, was treated for enterococcal bacteremia which cleared on vancomycin.

## 2019-03-27 NOTE — CHART NOTE - NSCHARTNOTEFT_GEN_A_CORE
Upon Nutritional Assessment by the Registered Dietitian your patient was determined to meet criteria / has evidence of the following diagnosis/diagnoses:          [x ]  Mild Protein Calorie Malnutrition        [ ]  Moderate Protein Calorie Malnutrition        [ ] Severe Protein Calorie Malnutrition        [ ] Unspecified Protein Calorie Malnutrition        [ ] Underweight / BMI <19        [ ] Morbid Obesity / BMI > 40      Findings as based on:  [x ] Comprehensive nutrition assessment: meeting <75% estimated needs for ~1 months, 18% intentional/unintentional weight loss within 3 months   [ ] Nutrition Focused Physical Exam  [ ] Other:       Nutrition Plan/Recommendations:  Add glucerna 2 daily         PROVIDER Section:     By signing this assessment you are acknowledging and agree with the diagnosis/diagnoses assigned by the Registered Dietitian    Comments:

## 2019-03-27 NOTE — PROGRESS NOTE ADULT - ATTENDING COMMENTS
pt seesm to eb doing well from pulmonary point of view: she is on bipap : at night time: being treated fro HCAP: She does have some phlegm and wants some mucolytic: will start robitussin:  3/27: check chest x-ray today and check her dopplers too!

## 2019-03-27 NOTE — DIETITIAN INITIAL EVALUATION ADULT. - NS AS NUTRI INTERV MEALS SNACK
1. Continue regular texture, consistent carbohydrate, DASH diet as per pt request, consider adding glucerna 2 daily (220 lbs, 10g protein per 8 oz serving), 2. Continue to encourage po intake and obtain/honor food preferences as able, 3. Monitor PO intake, diet tolerance, weight trends, labs, and skin integrity, 4. RD to remain available as needed

## 2019-03-27 NOTE — PROGRESS NOTE ADULT - SUBJECTIVE AND OBJECTIVE BOX
CC: Patient is a 63y old  Female who presents with a chief complaint of sob (27 Mar 2019 12:26)    ID following for PNA    Interval History/ROS: Patient remains with joint pain. No cough, but feels congested. Denies fever, chills.    Rest of ROS negative.    Allergies  No Known Allergies    ANTIMICROBIALS:      OTHER MEDS:  ALBUTerol/ipratropium for Nebulization 3 milliLiter(s) Nebulizer every 6 hours  dextrose 40% Gel 15 Gram(s) Oral once PRN  dextrose 5%. 1000 milliLiter(s) IV Continuous <Continuous>  dextrose 50% Injectable 12.5 Gram(s) IV Push once  dextrose 50% Injectable 25 Gram(s) IV Push once  dextrose 50% Injectable 25 Gram(s) IV Push once  docusate sodium 100 milliGRAM(s) Oral three times a day  enoxaparin Injectable 40 milliGRAM(s) SubCutaneous every 24 hours  fentaNYL   Patch  25 MICROgram(s)/Hr 1 Patch Transdermal every 72 hours  furosemide    Tablet 40 milliGRAM(s) Oral two times a day  gabapentin 300 milliGRAM(s) Oral three times a day  glucagon  Injectable 1 milliGRAM(s) IntraMuscular once PRN  guaiFENesin   Syrup  (Sugar-Free) 200 milliGRAM(s) Oral every 6 hours  influenza   Vaccine 0.5 milliLiter(s) IntraMuscular once  insulin glargine Injectable (LANTUS) 20 Unit(s) SubCutaneous at bedtime  insulin lispro (HumaLOG) corrective regimen sliding scale   SubCutaneous three times a day before meals  insulin lispro Injectable (HumaLOG) 10 Unit(s) SubCutaneous three times a day before meals  levothyroxine 137 MICROGram(s) Oral daily  ondansetron Injectable 4 milliGRAM(s) IV Push every 6 hours PRN  oxyCODONE    IR 10 milliGRAM(s) Oral every 4 hours PRN  pantoprazole    Tablet 40 milliGRAM(s) Oral before breakfast  polyethylene glycol 3350 17 Gram(s) Oral two times a day  predniSONE   Tablet 20 milliGRAM(s) Oral daily  primidone 25 milliGRAM(s) Oral daily  senna 2 Tablet(s) Oral at bedtime  sildenafil (REVATIO) 20 milliGRAM(s) Oral three times a day  simvastatin 20 milliGRAM(s) Oral at bedtime  sodium chloride 0.65% Nasal 1 Spray(s) Both Nostrils every 4 hours PRN  traMADol 50 milliGRAM(s) Oral two times a day    PE:    Vital Signs Last 24 Hrs  T(C): 36.9 (27 Mar 2019 08:23), Max: 37.3 (27 Mar 2019 03:00)  T(F): 98.5 (27 Mar 2019 08:23), Max: 99.1 (27 Mar 2019 03:00)  HR: 71 (27 Mar 2019 11:04) (70 - 109)  BP: 126/78 (27 Mar 2019 08:23) (111/74 - 143/76)  BP(mean): --  RR: 20 (27 Mar 2019 11:04) (18 - 20)  SpO2: 98% (27 Mar 2019 11:04) (95% - 98%)    Gen: AOx3, NAD  CV: S1+S2 normal, no murmurs  Resp: Clear bilat, no resp distress  Abd: Soft, nontender, +BS  Ext: No LE edema, no wounds  : No Antoine  IV/Skin: No thrombophlebitis, abd wound  Msk: No low back pain, no arthralgias, no joint swelling  Neuro: No sensory deficits, no motor deficits    LABS:                          9.3    9.16  )-----------( 345      ( 27 Mar 2019 09:56 )             31.1       03-27    137  |  98  |  16  ----------------------------<  192<H>  4.5   |  24  |  0.76    Ca    9.2      27 Mar 2019 07:18    MICROBIOLOGY:  Vancomycin Level, Trough: 20.0 ug/mL (03-26-19 @ 17:49)  v  .Blood Blood  03-22-19   No growth to date.  --  --      URINE MIDSTREAM  03-07-19 --  --  --      BLOOD PERIPHERAL  03-06-19 --  --  --      BLOOD PERIPHERAL  03-05-19 --  --  --      BLOOD  03-02-19 --  --  --      BLOOD VENOUS  02-28-19 --  --  --    Rapid RVP Result: NotDetec (03-21 @ 19:29)    RADIOLOGY:    < from: Xray Chest 1 View- PORTABLE-Urgent (03.27.19 @ 11:47) >  FINDINGS/  IMPRESSION:    Pulmonary edema is slightly increased. Small bilateral pleural effusions.   No pneumothorax.    The cardiac silhouette remains enlarged. Severe degenerative changes of   the right shoulder.    < end of copied text >

## 2019-03-27 NOTE — PROGRESS NOTE ADULT - ASSESSMENT
IMPRESSION  63 F hx of RA, COPD/CHRIS on nocturnal CPAP, admitted for multilobar PNA, presents w multiple warm, painful joints  Difficult to assess synovial effusions 2/2 to body habitus, does have several tender, warm joints; likely 2/2 to RA flare  Evidence of subluxations on examination    RECOMMEND IMPRESSION  63 F hx of RA, COPD/CHRIS on nocturnal CPAP, admitted for multilobar PNA, presents w multiple warm, painful joints. Difficult to assess synovial effusions 2/2 to body habitus, does have several tender, warm joints; likely 2/2 to RA flare  Evidence of subluxations on examination. Patient with new onset right foot drop    RECOMMEND  1) Consider MRI lumbar spine and neuro consult for new right sided foot drop.   2) Please continue prednisone 20mg   3) Consider increasing tramadol patient's home dose to 300mg ER as patient's pain

## 2019-03-27 NOTE — CONSULT NOTE ADULT - ASSESSMENT
63 year old female with HTN, DM2, COPD/CHRIS on nocturnal CPAP, discharged 3/20 from [facility] after a prolonged hospitalization for SBO requiring lysis of adhesions, complicated by hypercapnic respiratory failure requiring intubation. GI consulted for anemia.

## 2019-03-27 NOTE — DIETITIAN INITIAL EVALUATION ADULT. - NS AS NUTRI INTERV ED CONTENT
Nutrition education deferred at this time per pt request, discussed consistent carbohydrate, DASH diet briefly

## 2019-03-27 NOTE — DIETITIAN INITIAL EVALUATION ADULT. - ENERGY NEEDS
Pt seen for length of stay. Pt with PMH including HTN, type 2 DM, COPD on nocturnal CPAP, recent extended hospitalization 12/15/18-3/20/19 for SBO course complicated by respiratory failure requiring intubation  now admitted with shortness of breath and chest tightness found to have pneumonia-on antibiotics.     Ht: 5'1", Wt: 205.6 lbs, BMI: 38.8 kg/m2, IBW: 105 lbs +/- 10%, %IBW: 196%  2+ periorbital, L arm edema, Skin intact

## 2019-03-27 NOTE — CONSULT NOTE ADULT - PROBLEM SELECTOR RECOMMENDATION 9
- monitor h/h daily, transfuse prn  - s/p 1 unit prbc with good response  - check stool for occult blood  - no overt s/s GIB  - ppi daily   - recommend heme evaluation  - further recs pending above

## 2019-03-27 NOTE — PROGRESS NOTE ADULT - ASSESSMENT
63yof w/ HTN, DM2, COPD/CHRIS on nocturnal CPAP, discharged from [facility] after a prolonged hospitalization for SBO requiring lysis of adhesions, complicated by hypercapneic respiratory failure requiring intubation. Pt awoke with shortness of breath and chest tightness, was supposed to use CPAP last night but nursing facility did not have the machine. Pt oxygen dependent on 4L at baseline. Feeling better now with supplemental oxygen. No fevers or chills. Occasional cough.  originally admitted on 12/15 for SBO s/p ex lap with lysis of adhesions (4 retention sutures) c/b 12/30 she eviscerated 2/2 a coughing episode s/p ex lap (6 retention sutures) c/b multifocal pneumonia, developed hypercapnic respiratory failure requiring intubation 1/6, s/p bronch 1/9/18, and extubated on 1/24, course further c/b Enterococci bacteremia now readmitted to MICU for hypoxic respiratory failure 2/2 atelectasis requiring intubation 01/31. Had grown ESBL in urine, s/p ertapenem.  Extubated to high flow/ BIPAP.    pt was recently discharged from [facility] and now admitted here with chest pain and SOB:     The patient continues to do well as long as she continues with her BiPAP at night

## 2019-03-27 NOTE — DIETITIAN INITIAL EVALUATION ADULT. - ORAL INTAKE PTA
Pt recently discharged from extended hospitalization at [facility] 12/15/18-3/20/19, during this hospitalization pt was intubated on hypocaloric tube feeding early January and continued through late February with slight reprieve on dysphagia diet. For the last month of pt's extended hospitalization pt stated her appetite was fair and estimates consuming ~50% of meals more often than not dependent on food preferences, pt was also supplementing with glucerna 2 daily orally./fair

## 2019-03-27 NOTE — CONSULT NOTE ADULT - PROBLEM SELECTOR RECOMMENDATION 2
- s/p ex-lap 12/30 with findings of healthy bowel c/b evisceration of bowel requiring placement of retention sutures  - start bowel regimen with senna 2 tabs at bedtime, colace 100 mg TID, Miralax 17 grams BID for constipation

## 2019-03-27 NOTE — PROGRESS NOTE ADULT - SUBJECTIVE AND OBJECTIVE BOX
Patient is a 63y old  Female who presents with a chief complaint of sob (27 Mar 2019 11:17)      INTERVAL HPI/OVERNIGHT EVENTS:  T(C): 36.9 (03-27-19 @ 08:23), Max: 37.3 (03-27-19 @ 03:00)  HR: 71 (03-27-19 @ 11:04) (70 - 109)  BP: 126/78 (03-27-19 @ 08:23) (111/74 - 143/76)  RR: 20 (03-27-19 @ 11:04) (18 - 20)  SpO2: 98% (03-27-19 @ 11:04) (95% - 98%)  Wt(kg): --  I&O's Summary    26 Mar 2019 07:01  -  27 Mar 2019 07:00  --------------------------------------------------------  IN: 300 mL / OUT: 200 mL / NET: 100 mL    27 Mar 2019 07:01  -  27 Mar 2019 12:26  --------------------------------------------------------  IN: 200 mL / OUT: 200 mL / NET: 0 mL        LABS:                        9.3    9.16  )-----------( 345      ( 27 Mar 2019 09:56 )             31.1     03-27    137  |  98  |  16  ----------------------------<  192<H>  4.5   |  24  |  0.76    Ca    9.2      27 Mar 2019 07:18          CAPILLARY BLOOD GLUCOSE      POCT Blood Glucose.: 180 mg/dL (27 Mar 2019 12:18)  POCT Blood Glucose.: 219 mg/dL (27 Mar 2019 08:33)  POCT Blood Glucose.: 114 mg/dL (26 Mar 2019 21:12)  POCT Blood Glucose.: 158 mg/dL (26 Mar 2019 17:01)    ABG - ( 26 Mar 2019 06:13 )  pH, Arterial: 7.41  pH, Blood: x     /  pCO2: 45    /  pO2: 72    / HCO3: 28    / Base Excess: 3.9   /  SaO2: 95                      MEDICATIONS  (STANDING):  ALBUTerol/ipratropium for Nebulization 3 milliLiter(s) Nebulizer every 6 hours  dextrose 5%. 1000 milliLiter(s) (50 mL/Hr) IV Continuous <Continuous>  dextrose 50% Injectable 12.5 Gram(s) IV Push once  dextrose 50% Injectable 25 Gram(s) IV Push once  dextrose 50% Injectable 25 Gram(s) IV Push once  docusate sodium 100 milliGRAM(s) Oral three times a day  enoxaparin Injectable 40 milliGRAM(s) SubCutaneous every 24 hours  fentaNYL   Patch  25 MICROgram(s)/Hr 1 Patch Transdermal every 72 hours  furosemide    Tablet 40 milliGRAM(s) Oral two times a day  gabapentin 300 milliGRAM(s) Oral three times a day  guaiFENesin   Syrup  (Sugar-Free) 200 milliGRAM(s) Oral every 6 hours  influenza   Vaccine 0.5 milliLiter(s) IntraMuscular once  insulin glargine Injectable (LANTUS) 20 Unit(s) SubCutaneous at bedtime  insulin lispro (HumaLOG) corrective regimen sliding scale   SubCutaneous three times a day before meals  insulin lispro Injectable (HumaLOG) 10 Unit(s) SubCutaneous three times a day before meals  levothyroxine 137 MICROGram(s) Oral daily  pantoprazole    Tablet 40 milliGRAM(s) Oral before breakfast  polyethylene glycol 3350 17 Gram(s) Oral two times a day  predniSONE   Tablet 20 milliGRAM(s) Oral daily  primidone 25 milliGRAM(s) Oral daily  senna 2 Tablet(s) Oral at bedtime  sildenafil (REVATIO) 20 milliGRAM(s) Oral three times a day  simvastatin 20 milliGRAM(s) Oral at bedtime  traMADol 50 milliGRAM(s) Oral two times a day    MEDICATIONS  (PRN):  dextrose 40% Gel 15 Gram(s) Oral once PRN Blood Glucose LESS THAN 70 milliGRAM(s)/deciliter  glucagon  Injectable 1 milliGRAM(s) IntraMuscular once PRN Glucose LESS THAN 70 milligrams/deciliter  ondansetron Injectable 4 milliGRAM(s) IV Push every 6 hours PRN Nausea  oxyCODONE    IR 10 milliGRAM(s) Oral every 4 hours PRN Moderate Pain (4 - 6)  sodium chloride 0.65% Nasal 1 Spray(s) Both Nostrils every 4 hours PRN Nasal Congestion          PHYSICAL EXAM:  GENERAL: NAD, well-groomed, well-developed  HEAD:  Atraumatic, Normocephalic  CHEST/LUNG: Clear to percussion bilaterally; No rales, rhonchi, wheezing, or rubs  HEART: Regular rate and rhythm; No murmurs, rubs, or gallops  ABDOMEN: Soft, Nontender, Nondistended; Bowel sounds present  EXTREMITIES:  2+ Peripheral Pulses, No clubbing, cyanosis, or edema  LYMPH: No lymphadenopathy noted  SKIN: No rashes or lesions    Care Discussed with Consultants/Other Providers [ ] YES  [ ] NO

## 2019-03-27 NOTE — DIETITIAN INITIAL EVALUATION ADULT. - SIGNS/SYMPTOMS
[address] FOR BLOOD OR BLOOD COMPONENTS      In the course of your treatment, it may become necessary to administer a transfusion of blood or blood components.  This form provides basic information concerning this proc If loss of blood poses serious threats in the course of your treatment, THERE IS NO EFFECTIVE ALTERNATIVE TO BLOOD TRANSFUSION.  However, if you have any further questions on this matter, your physician will fully explain the alternatives to you if it has n 18% combination intentional/unintentional weight loss over 3 months, meeting <75% estimated needs 18% intentional/unintentional weight loss in 3 months, meeting <75% estimated needs for ~1 month

## 2019-03-28 DIAGNOSIS — Z71.89 OTHER SPECIFIED COUNSELING: ICD-10-CM

## 2019-03-28 LAB
ANION GAP SERPL CALC-SCNC: 17 MMOL/L — SIGNIFICANT CHANGE UP (ref 5–17)
BUN SERPL-MCNC: 19 MG/DL — SIGNIFICANT CHANGE UP (ref 7–23)
CALCIUM SERPL-MCNC: 9.6 MG/DL — SIGNIFICANT CHANGE UP (ref 8.4–10.5)
CHLORIDE SERPL-SCNC: 95 MMOL/L — LOW (ref 96–108)
CO2 SERPL-SCNC: 24 MMOL/L — SIGNIFICANT CHANGE UP (ref 22–31)
CREAT SERPL-MCNC: 0.71 MG/DL — SIGNIFICANT CHANGE UP (ref 0.5–1.3)
GLUCOSE BLDC GLUCOMTR-MCNC: 196 MG/DL — HIGH (ref 70–99)
GLUCOSE BLDC GLUCOMTR-MCNC: 67 MG/DL — LOW (ref 70–99)
GLUCOSE BLDC GLUCOMTR-MCNC: 73 MG/DL — SIGNIFICANT CHANGE UP (ref 70–99)
GLUCOSE SERPL-MCNC: 197 MG/DL — HIGH (ref 70–99)
POTASSIUM SERPL-MCNC: 4.2 MMOL/L — SIGNIFICANT CHANGE UP (ref 3.5–5.3)
POTASSIUM SERPL-SCNC: 4.2 MMOL/L — SIGNIFICANT CHANGE UP (ref 3.5–5.3)
RHEUMATOID FACT SERPL-ACNC: 374 IU/ML — HIGH (ref 0–13)
SODIUM SERPL-SCNC: 136 MMOL/L — SIGNIFICANT CHANGE UP (ref 135–145)

## 2019-03-28 PROCEDURE — 99232 SBSQ HOSP IP/OBS MODERATE 35: CPT

## 2019-03-28 PROCEDURE — 93970 EXTREMITY STUDY: CPT | Mod: 26

## 2019-03-28 RX ORDER — TRAMADOL HYDROCHLORIDE 50 MG/1
50 TABLET ORAL
Qty: 0 | Refills: 0 | Status: DISCONTINUED | OUTPATIENT
Start: 2019-03-28 | End: 2019-04-04

## 2019-03-28 RX ORDER — BUDESONIDE AND FORMOTEROL FUMARATE DIHYDRATE 160; 4.5 UG/1; UG/1
2 AEROSOL RESPIRATORY (INHALATION)
Qty: 0 | Refills: 0 | Status: DISCONTINUED | OUTPATIENT
Start: 2019-03-28 | End: 2019-04-09

## 2019-03-28 RX ORDER — FENTANYL CITRATE 50 UG/ML
1 INJECTION INTRAVENOUS
Qty: 0 | Refills: 0 | Status: DISCONTINUED | OUTPATIENT
Start: 2019-03-30 | End: 2019-04-05

## 2019-03-28 RX ADMIN — Medication 200 MILLIGRAM(S): at 05:27

## 2019-03-28 RX ADMIN — Medication 40 MILLIGRAM(S): at 18:16

## 2019-03-28 RX ADMIN — Medication 200 MILLIGRAM(S): at 13:32

## 2019-03-28 RX ADMIN — Medication 2: at 13:30

## 2019-03-28 RX ADMIN — Medication 20 MILLIGRAM(S): at 23:16

## 2019-03-28 RX ADMIN — Medication 40 MILLIGRAM(S): at 05:28

## 2019-03-28 RX ADMIN — PRIMIDONE 25 MILLIGRAM(S): 250 TABLET ORAL at 13:32

## 2019-03-28 RX ADMIN — GABAPENTIN 300 MILLIGRAM(S): 400 CAPSULE ORAL at 05:27

## 2019-03-28 RX ADMIN — FENTANYL CITRATE 1 PATCH: 50 INJECTION INTRAVENOUS at 21:22

## 2019-03-28 RX ADMIN — OXYCODONE HYDROCHLORIDE 10 MILLIGRAM(S): 5 TABLET ORAL at 21:23

## 2019-03-28 RX ADMIN — Medication 20 MILLIGRAM(S): at 05:26

## 2019-03-28 RX ADMIN — OXYCODONE HYDROCHLORIDE 10 MILLIGRAM(S): 5 TABLET ORAL at 10:15

## 2019-03-28 RX ADMIN — OXYCODONE HYDROCHLORIDE 10 MILLIGRAM(S): 5 TABLET ORAL at 02:45

## 2019-03-28 RX ADMIN — Medication 100 MILLIGRAM(S): at 05:26

## 2019-03-28 RX ADMIN — POLYETHYLENE GLYCOL 3350 17 GRAM(S): 17 POWDER, FOR SOLUTION ORAL at 18:15

## 2019-03-28 RX ADMIN — TRAMADOL HYDROCHLORIDE 50 MILLIGRAM(S): 50 TABLET ORAL at 18:13

## 2019-03-28 RX ADMIN — Medication 137 MICROGRAM(S): at 05:27

## 2019-03-28 RX ADMIN — TRAMADOL HYDROCHLORIDE 50 MILLIGRAM(S): 50 TABLET ORAL at 05:26

## 2019-03-28 RX ADMIN — Medication 10 UNIT(S): at 13:31

## 2019-03-28 RX ADMIN — Medication 100 MILLIGRAM(S): at 23:16

## 2019-03-28 RX ADMIN — Medication 200 MILLIGRAM(S): at 18:15

## 2019-03-28 RX ADMIN — Medication 10 UNIT(S): at 09:09

## 2019-03-28 RX ADMIN — OXYCODONE HYDROCHLORIDE 10 MILLIGRAM(S): 5 TABLET ORAL at 23:16

## 2019-03-28 RX ADMIN — Medication 20 MILLIGRAM(S): at 13:32

## 2019-03-28 RX ADMIN — OXYCODONE HYDROCHLORIDE 10 MILLIGRAM(S): 5 TABLET ORAL at 13:33

## 2019-03-28 RX ADMIN — OXYCODONE HYDROCHLORIDE 10 MILLIGRAM(S): 5 TABLET ORAL at 09:18

## 2019-03-28 RX ADMIN — GABAPENTIN 300 MILLIGRAM(S): 400 CAPSULE ORAL at 23:16

## 2019-03-28 RX ADMIN — OXYCODONE HYDROCHLORIDE 10 MILLIGRAM(S): 5 TABLET ORAL at 18:13

## 2019-03-28 RX ADMIN — Medication 20 MILLIGRAM(S): at 05:28

## 2019-03-28 RX ADMIN — SENNA PLUS 2 TABLET(S): 8.6 TABLET ORAL at 23:16

## 2019-03-28 RX ADMIN — Medication 10 UNIT(S): at 18:15

## 2019-03-28 RX ADMIN — Medication 100 MILLIGRAM(S): at 13:32

## 2019-03-28 RX ADMIN — Medication 3 MILLILITER(S): at 05:27

## 2019-03-28 RX ADMIN — OXYCODONE HYDROCHLORIDE 10 MILLIGRAM(S): 5 TABLET ORAL at 02:15

## 2019-03-28 RX ADMIN — Medication 3 MILLILITER(S): at 18:16

## 2019-03-28 RX ADMIN — SIMVASTATIN 20 MILLIGRAM(S): 20 TABLET, FILM COATED ORAL at 23:15

## 2019-03-28 RX ADMIN — Medication 3 MILLILITER(S): at 13:31

## 2019-03-28 RX ADMIN — FENTANYL CITRATE 1 PATCH: 50 INJECTION INTRAVENOUS at 07:06

## 2019-03-28 RX ADMIN — BUDESONIDE AND FORMOTEROL FUMARATE DIHYDRATE 2 PUFF(S): 160; 4.5 AEROSOL RESPIRATORY (INHALATION) at 18:08

## 2019-03-28 RX ADMIN — PANTOPRAZOLE SODIUM 40 MILLIGRAM(S): 20 TABLET, DELAYED RELEASE ORAL at 09:09

## 2019-03-28 RX ADMIN — GABAPENTIN 300 MILLIGRAM(S): 400 CAPSULE ORAL at 13:32

## 2019-03-28 RX ADMIN — Medication 3 MILLILITER(S): at 23:17

## 2019-03-28 RX ADMIN — Medication 1: at 09:08

## 2019-03-28 RX ADMIN — POLYETHYLENE GLYCOL 3350 17 GRAM(S): 17 POWDER, FOR SOLUTION ORAL at 05:32

## 2019-03-28 NOTE — PROGRESS NOTE ADULT - SUBJECTIVE AND OBJECTIVE BOX
CC: Patient is a 63y old  Female who presents with a chief complaint of sob (28 Mar 2019 11:41)    ID following for PNA    Interval History/ROS: Patient endorsing right leg pain. Remains with cough. Denies fever, chills.    Rest of ROS negative.    Allergies  No Known Allergies    ANTIMICROBIALS:      OTHER MEDS:  ALBUTerol/ipratropium for Nebulization 3 milliLiter(s) Nebulizer every 6 hours  buDESOnide  80 MICROgram(s)/formoterol 4.5 MICROgram(s) Inhaler 2 Puff(s) Inhalation two times a day  dextrose 40% Gel 15 Gram(s) Oral once PRN  dextrose 5%. 1000 milliLiter(s) IV Continuous <Continuous>  dextrose 50% Injectable 12.5 Gram(s) IV Push once  dextrose 50% Injectable 25 Gram(s) IV Push once  dextrose 50% Injectable 25 Gram(s) IV Push once  docusate sodium 100 milliGRAM(s) Oral three times a day  enoxaparin Injectable 40 milliGRAM(s) SubCutaneous every 24 hours  furosemide    Tablet 40 milliGRAM(s) Oral two times a day  gabapentin 300 milliGRAM(s) Oral three times a day  glucagon  Injectable 1 milliGRAM(s) IntraMuscular once PRN  guaiFENesin   Syrup  (Sugar-Free) 200 milliGRAM(s) Oral every 6 hours  influenza   Vaccine 0.5 milliLiter(s) IntraMuscular once  insulin glargine Injectable (LANTUS) 20 Unit(s) SubCutaneous at bedtime  insulin lispro (HumaLOG) corrective regimen sliding scale   SubCutaneous three times a day before meals  insulin lispro Injectable (HumaLOG) 10 Unit(s) SubCutaneous three times a day before meals  levothyroxine 137 MICROGram(s) Oral daily  ondansetron Injectable 4 milliGRAM(s) IV Push every 6 hours PRN  oxyCODONE    IR 10 milliGRAM(s) Oral every 4 hours PRN  pantoprazole    Tablet 40 milliGRAM(s) Oral before breakfast  polyethylene glycol 3350 17 Gram(s) Oral two times a day  predniSONE   Tablet 20 milliGRAM(s) Oral daily  primidone 25 milliGRAM(s) Oral daily  senna 2 Tablet(s) Oral at bedtime  sildenafil (REVATIO) 20 milliGRAM(s) Oral three times a day  simvastatin 20 milliGRAM(s) Oral at bedtime  sodium chloride 0.65% Nasal 1 Spray(s) Both Nostrils every 4 hours PRN  traMADol 50 milliGRAM(s) Oral two times a day    PE:    Vital Signs Last 24 Hrs  T(C): 36.4 (28 Mar 2019 08:17), Max: 37.1 (27 Mar 2019 15:20)  T(F): 97.6 (28 Mar 2019 08:17), Max: 98.8 (27 Mar 2019 15:20)  HR: 95 (28 Mar 2019 12:05) (78 - 103)  BP: 128/82 (28 Mar 2019 12:05) (116/69 - 159/83)  BP(mean): --  RR: 16 (28 Mar 2019 10:43) (16 - 19)  SpO2: 96% (28 Mar 2019 12:05) (94% - 99%)    Gen: AOx3, NAD, non-toxic  CV: S1+S2 normal, no murmurs  Resp: Clear bilat, no resp distress  Abd: Soft, nontender, +BS  Ext: No LE edema, no wounds  : No Antoine  IV/Skin: No thrombophlebitis  Neuro: no focal deficits    LABS:                          9.3    9.16  )-----------( 345      ( 27 Mar 2019 09:56 )             31.1       03-28    136  |  95<L>  |  19  ----------------------------<  197<H>  4.2   |  24  |  0.71    Ca    9.6      28 Mar 2019 09:39    MICROBIOLOGY:  v  .Blood Blood  03-22-19   No growth at 5 days.  --  --      URINE MIDSTREAM  03-07-19 --  --  --      BLOOD PERIPHERAL  03-06-19 --  --  --      BLOOD PERIPHERAL  03-05-19 --  --  --      BLOOD  03-02-19 --  --  --      BLOOD VENOUS  02-28-19 --  --  --    Rapid RVP Result: NotDetec (03-21 @ 19:29)    RADIOLOGY:    < from: Xray Chest 1 View- PORTABLE-Urgent (03.27.19 @ 11:47) >  FINDINGS/  IMPRESSION:    Pulmonary edema is slightly increased. Small bilateral pleural effusions.   No pneumothorax.    The cardiac silhouette remains enlarged. Severe degenerative changes of   the right shoulder.    < end of copied text >

## 2019-03-28 NOTE — PROGRESS NOTE ADULT - ATTENDING COMMENTS
Patient seen and examined, agree with above assessment and plan as transcribed above.    - cont PO lasix,  - No clinical CHF, hyperdynamic LV  - Check Albumin, Prealbumin and total protein, pleural effusions may be due to third spacing, low oncotic pressure.    - Nutritional support    Vimal Joseph MD, [facility]  BEEPER [phone]

## 2019-03-28 NOTE — PHYSICAL THERAPY INITIAL EVALUATION ADULT - PRECAUTIONS/LIMITATIONS, REHAB EVAL
originally admitted on 12/15 for SBO s/p ex lap with lysis of adhesions (4 retention sutures) c/b 12/30 she eviscerated 2/2 a coughing episode s/p ex lap (6 retention sutures) c/b multifocal pneumonia, developed hypercapnic respiratory failure requiring intubation 1/6, s/p bronch 1/9/18, and extubated on 1/24, course further c/b Enterococci bacteremia now readmitted to MICU for hypoxic respiratory failure 2/2 atelectasis requiring intubation 01/31. Had grown ESBL in urine, s/p ertapenem. Extubated to high flow/ BIPAP.

## 2019-03-28 NOTE — PHYSICAL THERAPY INITIAL EVALUATION ADULT - ADDITIONAL COMMENTS
Pt lives alone in an apartment on the ground level. Pt required assistance w/ ADL's PTA. Pt amb very short distances w/ RW PTA. Pt has a HHA 7daysx 12 hours. Pt has a scooter, RW, shower chair and commode at home.

## 2019-03-28 NOTE — PHYSICAL THERAPY INITIAL EVALUATION ADULT - PERTINENT HX OF CURRENT PROBLEM, REHAB EVAL
63yof w/ HTN, DM2, COPD/CHRIS on nocturnal CPAP, discharged from [facility] after a prolonged hospitalization for SBO requiring lysis of adhesions, complicated by hypercapneic respiratory failure requiring intubation.  Pt oxygen dependent on 4L at baseline. Feeling better now with supplemental oxygen. No fevers or chills. Occasional cough.

## 2019-03-28 NOTE — PROGRESS NOTE ADULT - ASSESSMENT
63 year old female with HTN, DM2, COPD/CHRIS on nocturnal CPAP, discharged yesterday from [facility] after a prolonged hospitalization for SBO requiring lysis of adhesions, complicated by hypercapneic respiratory failure requiring intubation.     No fevers or chills  Occasional cough  Afebrile, leukocytosis, tachycardic  RVP negative  CTA chest with no pulmonary embolism, but with patchy opacities/ consolidations within both lower lobes and the RML which have decreased compared to prior exam. Residual patchy opacities are still noted. New patchy groundglass opacities are noted within both upper lobes.   Suspect Pneumonia  At [facility] on a recent admission, was treated for enterococcal bacteremia which cleared on vancomycin.

## 2019-03-28 NOTE — PROGRESS NOTE ADULT - ASSESSMENT
63yof w/ HTN, DM2, COPD/CHRIS on nocturnal CPAP, discharged from [facility] after a prolonged hospitalization for SBO requiring lysis of adhesions, complicated by hypercapneic respiratory failure requiring intubation. Pt awoke with shortness of breath and chest tightness, was supposed to use CPAP last night but nursing facility did not have the machine. Pt oxygen dependent on 4L at baseline. Feeling better now with supplemental oxygen. No fevers or chills. Occasional cough.  originally admitted on 12/15 for SBO s/p ex lap with lysis of adhesions (4 retention sutures) c/b 12/30 she eviscerated 2/2 a coughing episode s/p ex lap (6 retention sutures) c/b multifocal pneumonia, developed hypercapnic respiratory failure requiring intubation 1/6, s/p bronch 1/9/18, and extubated on 1/24, course further c/b Enterococci bacteremia now readmitted to MICU for hypoxic respiratory failure 2/2 atelectasis requiring intubation 01/31. Had grown ESBL in urine, s/p ertapenem.  Extubated to high flow/ BIPAP.    Recent TTE with concentric LVH, though normal LA size - unclear if element of diastolic CHF. Suspect overead of portable CXR. PE is more suggestive of COPD with prolonged exp and some wheezint noted. There is no sign peripheral edema    REC    Continue qid duoneb  Negative balance as tolerated  Continue nocturnal BIPAP  Add symbicort 80/ bid  Consider bid prednisone if f/u exams with wheezing

## 2019-03-28 NOTE — PROGRESS NOTE ADULT - SUBJECTIVE AND OBJECTIVE BOX
pt seen and examined,  no  chest pain , ROS -     ALBUTerol/ipratropium for Nebulization 3 milliLiter(s) Nebulizer every 6 hours  dextrose 40% Gel 15 Gram(s) Oral once PRN  dextrose 5%. 1000 milliLiter(s) IV Continuous <Continuous>  dextrose 50% Injectable 12.5 Gram(s) IV Push once  dextrose 50% Injectable 25 Gram(s) IV Push once  dextrose 50% Injectable 25 Gram(s) IV Push once  docusate sodium 100 milliGRAM(s) Oral three times a day  enoxaparin Injectable 40 milliGRAM(s) SubCutaneous every 24 hours  furosemide    Tablet 40 milliGRAM(s) Oral two times a day  gabapentin 300 milliGRAM(s) Oral three times a day  glucagon  Injectable 1 milliGRAM(s) IntraMuscular once PRN  guaiFENesin   Syrup  (Sugar-Free) 200 milliGRAM(s) Oral every 6 hours  influenza   Vaccine 0.5 milliLiter(s) IntraMuscular once  insulin glargine Injectable (LANTUS) 20 Unit(s) SubCutaneous at bedtime  insulin lispro (HumaLOG) corrective regimen sliding scale   SubCutaneous three times a day before meals  insulin lispro Injectable (HumaLOG) 10 Unit(s) SubCutaneous three times a day before meals  levothyroxine 137 MICROGram(s) Oral daily  ondansetron Injectable 4 milliGRAM(s) IV Push every 6 hours PRN  oxyCODONE    IR 10 milliGRAM(s) Oral every 4 hours PRN  pantoprazole    Tablet 40 milliGRAM(s) Oral before breakfast  polyethylene glycol 3350 17 Gram(s) Oral two times a day  predniSONE   Tablet 20 milliGRAM(s) Oral daily  primidone 25 milliGRAM(s) Oral daily  senna 2 Tablet(s) Oral at bedtime  sildenafil (REVATIO) 20 milliGRAM(s) Oral three times a day  simvastatin 20 milliGRAM(s) Oral at bedtime  sodium chloride 0.65% Nasal 1 Spray(s) Both Nostrils every 4 hours PRN  traMADol 50 milliGRAM(s) Oral two times a day                            9.3    9.16  )-----------( 345      ( 27 Mar 2019 09:56 )             31.1       Hemoglobin: 9.3 g/dL (03-27 @ 09:56)  Hemoglobin: 7.6 g/dL (03-26 @ 08:47)  Hemoglobin: 8.3 g/dL (03-24 @ 09:57)      03-27    137  |  98  |  16  ----------------------------<  192<H>  4.5   |  24  |  0.76    Ca    9.2      27 Mar 2019 07:18      Creatinine Trend: 0.76<--, 0.86<--, 0.97<--, 1.00<--, 1.02<--, 1.14<--    COAGS:           T(C): 37.1 (03-28-19 @ 00:39), Max: 37.1 (03-27-19 @ 15:20)  HR: 99 (03-28-19 @ 00:39) (70 - 100)  BP: 159/83 (03-28-19 @ 00:39) (116/69 - 159/83)  RR: 16 (03-28-19 @ 00:39) (16 - 20)  SpO2: 94% (03-28-19 @ 00:39) (94% - 98%)  Wt(kg): --    I&O's Summary    26 Mar 2019 07:01  -  27 Mar 2019 07:00  --------------------------------------------------------  IN: 300 mL / OUT: 200 mL / NET: 100 mL    27 Mar 2019 07:01  -  28 Mar 2019 04:57  --------------------------------------------------------  IN: 500 mL / OUT: 200 mL / NET: 300 mL        Gen: Appears well in NAD  HEENT:  (-)icterus (-)pallor  CV: N S1 S2 1/6 OJ (+)2 Pulses B/l  Resp:  Clear to ausculatation B/L, normal effort  GI: (+) BS Soft, NT, ND  Lymph:  (-)Edema, (-)obvious lymphadenopathy  Skin: Warm to touch, Normal turgor  Psych: Appropriate mood and affect      ECG:  	sinus Tachycardia 109 BPM, nonspecific T wave abnormality    RADIOLOGY:         CXR:  < from: Xray Chest 1 View- PORTABLE-Urgent (03.21.19 @ 11:18) >  The heart is enlarged. Improvingopacities in both lungs when compared to   previous study done on March 6, 2019. Those changes are compatible with   interstitial fluid however an infectious process cannot be ruled out   entirely. Severe degenerative changes of the right shoulder. Degenerative   changes of the thoracic spine is evident as well.    < end of copied text >      ECHO: pending < from: Transthoracic Echocardiogram (03.26.19 @ 10:29) >  Conclusions:  1. Increased relative wall thickness with normal left  ventricular mass index, consistent with concentric left  ventricular remodeling.  2. Hyperdynamic left ventricular systolic function.  3. Estimated pulmonary artery systolic pressure equals 42  mm Hg, assuming right atrial pressureequals 8 mm Hg,  consistent with mild pulmonary pressures.    < end of copied text >      ASSESSMENT/PLAN: 	63y Female  HTN, DM2, COPD/CHRIS on nocturnal CPAP, normal LV function un echo 1/19 moderate pulmonary HTN discharged yesterday from [facility] after a prolonged hospitalization for SBO requiring lysis of adhesions, complicated by hypercapneic respiratory failure requiring intubation now being treated for HCAP..    - 3/26 echo noted, no further CV work up needed at present  - cont diuretics , keep net neg   - Cpap per Pulm, steroids taper ,  -  cont All meds for pulm htn    -  GI / DVT prophylaxis,  keep K>4, mag >2.0  D/W Dr Pineda

## 2019-03-28 NOTE — PROGRESS NOTE ADULT - ASSESSMENT
Problem/Plan - 1:  ·  Problem: HAP (hospital-acquired pneumonia).  Plan: cw vanco and angela  id and pulmonary fu  will monitor.     Problem/Plan - 2:  ·  Problem: Chronic hypoxic respiratory failure , Oxygen dependent.  Plan: bipap at night   pulmonary fu.   BIPAP prn     Problem/Plan - 3:  ·  Problem: Diabetes.  Plan: monitor FS  ISS.     Problem/Plan - 4:  ·  Problem: foot drop.  Plan: rheum fu appreciated  neuro called

## 2019-03-28 NOTE — PROGRESS NOTE ADULT - SUBJECTIVE AND OBJECTIVE BOX
Interval Events: pt seen and examined. She denies abdominal pain, n/v. Tolerating PO well  no bm this morning  await am cbc    MEDICATIONS  (STANDING):  ALBUTerol/ipratropium for Nebulization 3 milliLiter(s) Nebulizer every 6 hours  dextrose 5%. 1000 milliLiter(s) (50 mL/Hr) IV Continuous <Continuous>  dextrose 50% Injectable 12.5 Gram(s) IV Push once  dextrose 50% Injectable 25 Gram(s) IV Push once  dextrose 50% Injectable 25 Gram(s) IV Push once  docusate sodium 100 milliGRAM(s) Oral three times a day  enoxaparin Injectable 40 milliGRAM(s) SubCutaneous every 24 hours  furosemide    Tablet 40 milliGRAM(s) Oral two times a day  gabapentin 300 milliGRAM(s) Oral three times a day  guaiFENesin   Syrup  (Sugar-Free) 200 milliGRAM(s) Oral every 6 hours  influenza   Vaccine 0.5 milliLiter(s) IntraMuscular once  insulin glargine Injectable (LANTUS) 20 Unit(s) SubCutaneous at bedtime  insulin lispro (HumaLOG) corrective regimen sliding scale   SubCutaneous three times a day before meals  insulin lispro Injectable (HumaLOG) 10 Unit(s) SubCutaneous three times a day before meals  levothyroxine 137 MICROGram(s) Oral daily  pantoprazole    Tablet 40 milliGRAM(s) Oral before breakfast  polyethylene glycol 3350 17 Gram(s) Oral two times a day  predniSONE   Tablet 20 milliGRAM(s) Oral daily  primidone 25 milliGRAM(s) Oral daily  senna 2 Tablet(s) Oral at bedtime  sildenafil (REVATIO) 20 milliGRAM(s) Oral three times a day  simvastatin 20 milliGRAM(s) Oral at bedtime  traMADol 50 milliGRAM(s) Oral two times a day    MEDICATIONS  (PRN):  dextrose 40% Gel 15 Gram(s) Oral once PRN Blood Glucose LESS THAN 70 milliGRAM(s)/deciliter  glucagon  Injectable 1 milliGRAM(s) IntraMuscular once PRN Glucose LESS THAN 70 milligrams/deciliter  ondansetron Injectable 4 milliGRAM(s) IV Push every 6 hours PRN Nausea  oxyCODONE    IR 10 milliGRAM(s) Oral every 4 hours PRN Moderate Pain (4 - 6)  sodium chloride 0.65% Nasal 1 Spray(s) Both Nostrils every 4 hours PRN Nasal Congestion      Allergies    No Known Allergies    Intolerances    Seroquel (Other)      Review of Systems:    General:  No wt loss, fevers, chills, night sweats,fatigue,   Eyes:  Good vision, no reported pain  ENT:  No sore throat, pain, runny nose, dysphagia  CV:  No pain, palpitations, hypo/hypertension  Resp:  No dyspnea, cough, tachypnea, wheezing  GI:  No pain, No nausea, No vomiting, No diarrhea, No constipation, No weight loss, No fever, No pruritis, No rectal bleeding, No melena, No dysphagia  :  No pain, bleeding, incontinence, nocturia  Muscle:  No pain, weakness  Neuro:  No weakness, tingling, memory problems  Psych:  No fatigue, insomnia, mood problems, depression  Endocrine:  No polyuria, polydypsia, cold/heat intolerance  Heme:  No petechiae, ecchymosis, easy bruisability  Skin:  No rash, tattoos, scars, edema      Vital Signs Last 24 Hrs  T(C): 36.4 (28 Mar 2019 08:17), Max: 37.1 (27 Mar 2019 15:20)  T(F): 97.6 (28 Mar 2019 08:17), Max: 98.8 (27 Mar 2019 15:20)  HR: 95 (28 Mar 2019 08:17) (71 - 103)  BP: 128/82 (28 Mar 2019 08:17) (116/69 - 159/83)  BP(mean): --  RR: 18 (28 Mar 2019 08:17) (16 - 20)  SpO2: 96% (28 Mar 2019 08:17) (94% - 99%)    PHYSICAL EXAM:    GENERAL:  Appears stated age, well-groomed, well-nourished, no distress  HEENT:  NC/AT,  conjunctivae clear and pink, no thyromegaly, nodules, adenopathy, no JVD, sclera -anicteric  CHEST:  Full & symmetric excursion, no increased effort, breath sounds clear  HEART:  Regular rhythm, S1, S2, no murmur/rub/S3/S4, no abdominal bruit, no edema  ABDOMEN:  Soft, non-tender, non-distended,+ abdominal incision dressed-- c/d/i   EXTEREMITIES:  no cyanosis,clubbing or edema  SKIN:  No rash/erythema/ecchymoses/petechiae/wounds/abscess/warm/dry  NEURO:  Alert, oriented, no asterixis, no tremor, no encephalopathy    LABS:                        9.3    9.16  )-----------( 345      ( 27 Mar 2019 09:56 )             31.1     03-27    137  |  98  |  16  ----------------------------<  192<H>  4.5   |  24  |  0.76    Ca    9.2      27 Mar 2019 07:18            RADIOLOGY & ADDITIONAL TESTS:

## 2019-03-28 NOTE — PROGRESS NOTE ADULT - ASSESSMENT
63 year old female with HTN, DM2, COPD/CHRIS on nocturnal CPAP, discharged 3/20 from [facility] after a prolonged hospitalization for SBO requiring lysis of adhesions, complicated by hypercapnic respiratory failure requiring intubation. GI consulted for anemia.

## 2019-03-28 NOTE — PROGRESS NOTE ADULT - SUBJECTIVE AND OBJECTIVE BOX
Patient is a 63y old  Female who presents with a chief complaint of sob (27 Mar 2019 04:24)    Using BIPAP at night  C/O dyspnea with minimal activity - comfortable at rest  CXR: AP/lordotic, doubt overt pulm edema as per read    Vital Signs Last 24 Hrs  T(C): 36.4 (28 Mar 2019 08:17), Max: 37.1 (27 Mar 2019 15:20)  T(F): 97.6 (28 Mar 2019 08:17), Max: 98.8 (27 Mar 2019 15:20)  HR: 98 (28 Mar 2019 10:43) (78 - 103)  BP: 128/82 (28 Mar 2019 08:17) (116/69 - 159/83)  BP(mean): --  RR: 16 (28 Mar 2019 10:43) (16 - 19)  SpO2: 99% (28 Mar 2019 10:43) (94% - 99%)                         9.3    9.16  )-----------( 345      ( 27 Mar 2019 09:56 )            31.1     03-28    136  |  95<L>  |  19  ----------------------------<  197<H>  4.2   |  24  |  0.71    Ca    9.6      28 Mar 2019 09:39      I&O's Summary    26 Mar 2019 07:01  -  27 Mar 2019 07:00  --------------------------------------------------------  IN: 300 mL / OUT: 200 mL / NET: 100 mL    27 Mar 2019 07:01  -  27 Mar 2019 11:06  --------------------------------------------------------  IN: 200 mL / OUT: 200 mL / NET: 0 mL    Physical Exam:   GENERAL: NAD, well-groomed, well-developed  HEENT: KWAME/   Atraumatic, Normocephalic  ENMT: No tonsillar erythema, exudates, or enlargement; Moist mucous membranes, Good dentition, No lesions  NECK: Supple, No JVD, Normal thyroid  CHEST/LUNG: PRONONGED expiration, few scattered wheezes  CVS: Regular rate and rhythm; No murmurs, rubs, or gallops  GI: : Soft, Nontender, Nondistended; Bowel sounds present  NERVOUS SYSTEM:  Alert & Oriented X3,  EXTREMITIES:  2+ Peripheral Pulses, No clubbing, cyanosis, or edema  LYMPH: No lymphadenopathy noted  SKIN: No rashes or lesions  ENDOCRINOLOGY: No Thyromegaly  PSYCH: Appropriate     INTERPRETATION:  A single chest x-ray was obtained on March 21, 2019.    Indication: Chest pain.    Impression:    The heart is enlarged. Improvingopacities in both lungs when compared to   previous study done on March 6, 2019. Those changes are compatible with   interstitial fluid however an infectious process cannot be ruled out   entirely. Severe degenerative changes of the right shoulder. Degenerative   changes of the thoracic spine is evident as well.

## 2019-03-28 NOTE — PROGRESS NOTE ADULT - SUBJECTIVE AND OBJECTIVE BOX
Patient is a 63y old  Female who presents with a chief complaint of sob (28 Mar 2019 13:20)      INTERVAL HPI/OVERNIGHT EVENTS:  T(C): 36.8 (03-28-19 @ 15:25), Max: 37.1 (03-28-19 @ 00:39)  HR: 98 (03-28-19 @ 18:16) (78 - 103)  BP: 116/81 (03-28-19 @ 15:25) (116/81 - 159/83)  RR: 20 (03-28-19 @ 15:28) (16 - 20)  SpO2: 99% (03-28-19 @ 18:16) (94% - 99%)  Wt(kg): --  I&O's Summary    27 Mar 2019 07:01  -  28 Mar 2019 07:00  --------------------------------------------------------  IN: 500 mL / OUT: 200 mL / NET: 300 mL    28 Mar 2019 07:01  -  28 Mar 2019 18:30  --------------------------------------------------------  IN: 420 mL / OUT: 200 mL / NET: 220 mL        LABS:                        9.3    9.16  )-----------( 345      ( 27 Mar 2019 09:56 )             31.1     03-28    136  |  95<L>  |  19  ----------------------------<  197<H>  4.2   |  24  |  0.71    Ca    9.6      28 Mar 2019 09:39          CAPILLARY BLOOD GLUCOSE      POCT Blood Glucose.: 196 mg/dL (28 Mar 2019 08:43)  POCT Blood Glucose.: 87 mg/dL (27 Mar 2019 21:11)            MEDICATIONS  (STANDING):  ALBUTerol/ipratropium for Nebulization 3 milliLiter(s) Nebulizer every 6 hours  buDESOnide  80 MICROgram(s)/formoterol 4.5 MICROgram(s) Inhaler 2 Puff(s) Inhalation two times a day  dextrose 5%. 1000 milliLiter(s) (50 mL/Hr) IV Continuous <Continuous>  dextrose 50% Injectable 12.5 Gram(s) IV Push once  dextrose 50% Injectable 25 Gram(s) IV Push once  dextrose 50% Injectable 25 Gram(s) IV Push once  docusate sodium 100 milliGRAM(s) Oral three times a day  enoxaparin Injectable 40 milliGRAM(s) SubCutaneous every 24 hours  furosemide    Tablet 40 milliGRAM(s) Oral two times a day  gabapentin 300 milliGRAM(s) Oral three times a day  guaiFENesin   Syrup  (Sugar-Free) 200 milliGRAM(s) Oral every 6 hours  influenza   Vaccine 0.5 milliLiter(s) IntraMuscular once  insulin glargine Injectable (LANTUS) 20 Unit(s) SubCutaneous at bedtime  insulin lispro (HumaLOG) corrective regimen sliding scale   SubCutaneous three times a day before meals  insulin lispro Injectable (HumaLOG) 10 Unit(s) SubCutaneous three times a day before meals  levothyroxine 137 MICROGram(s) Oral daily  pantoprazole    Tablet 40 milliGRAM(s) Oral before breakfast  polyethylene glycol 3350 17 Gram(s) Oral two times a day  predniSONE   Tablet 20 milliGRAM(s) Oral daily  primidone 25 milliGRAM(s) Oral daily  senna 2 Tablet(s) Oral at bedtime  sildenafil (REVATIO) 20 milliGRAM(s) Oral three times a day  simvastatin 20 milliGRAM(s) Oral at bedtime    MEDICATIONS  (PRN):  dextrose 40% Gel 15 Gram(s) Oral once PRN Blood Glucose LESS THAN 70 milliGRAM(s)/deciliter  glucagon  Injectable 1 milliGRAM(s) IntraMuscular once PRN Glucose LESS THAN 70 milligrams/deciliter  ondansetron Injectable 4 milliGRAM(s) IV Push every 6 hours PRN Nausea  oxyCODONE    IR 10 milliGRAM(s) Oral every 4 hours PRN Moderate Pain (4 - 6)  sodium chloride 0.65% Nasal 1 Spray(s) Both Nostrils every 4 hours PRN Nasal Congestion          PHYSICAL EXAM:  GENERAL: NAD, well-groomed, well-developed  HEAD:  Atraumatic, Normocephalic  CHEST/LUNG: Clear to percussion bilaterally; No rales, rhonchi, wheezing, or rubs  HEART: Regular rate and rhythm; No murmurs, rubs, or gallops  ABDOMEN: Soft, Nontender, Nondistended; Bowel sounds present  EXTREMITIES:  2+ Peripheral Pulses, No clubbing, cyanosis, or edema  LYMPH: No lymphadenopathy noted  SKIN: No rashes or lesions    Care Discussed with Consultants/Other Providers [ ] YES  [ ] NO

## 2019-03-28 NOTE — CHART NOTE - NSCHARTNOTEFT_GEN_A_CORE
Chart and imaging reviewed  Patient will be evaluated tomorrow (3/29) in the AM and will be evaluated by attending for chronic (> 1 month) R foot drop that has worsened.

## 2019-03-28 NOTE — PHYSICAL THERAPY INITIAL EVALUATION ADULT - ASR WT BEARING STATUS EVAL
no weight-bearing restrictions 3/27 Chest X-ray: Pulmonary edema  slightly increased. Small B/L pleural effusions./no weight-bearing restrictions

## 2019-03-28 NOTE — PHYSICAL THERAPY INITIAL EVALUATION ADULT - CRITERIA FOR SKILLED THERAPEUTIC INTERVENTIONS
impairments found/rehab potential/predicted duration of therapy intervention/anticipated equipment needs at discharge/risk reduction/prevention/therapy frequency/anticipated discharge recommendation/functional limitations in following categories

## 2019-03-29 ENCOUNTER — TRANSCRIPTION ENCOUNTER (OUTPATIENT)
Age: 64
End: 2019-03-29

## 2019-03-29 LAB
GLUCOSE BLDC GLUCOMTR-MCNC: 100 MG/DL — HIGH (ref 70–99)
GLUCOSE BLDC GLUCOMTR-MCNC: 116 MG/DL — HIGH (ref 70–99)
GLUCOSE BLDC GLUCOMTR-MCNC: 145 MG/DL — HIGH (ref 70–99)
GLUCOSE BLDC GLUCOMTR-MCNC: 148 MG/DL — HIGH (ref 70–99)
GLUCOSE BLDC GLUCOMTR-MCNC: 150 MG/DL — HIGH (ref 70–99)
GLUCOSE BLDC GLUCOMTR-MCNC: 239 MG/DL — HIGH (ref 70–99)
GLUCOSE BLDC GLUCOMTR-MCNC: 249 MG/DL — HIGH (ref 70–99)
GLUCOSE BLDC GLUCOMTR-MCNC: 285 MG/DL — HIGH (ref 70–99)
GLUCOSE BLDC GLUCOMTR-MCNC: 297 MG/DL — HIGH (ref 70–99)
GLUCOSE BLDC GLUCOMTR-MCNC: 80 MG/DL — SIGNIFICANT CHANGE UP (ref 70–99)
GLUCOSE BLDC GLUCOMTR-MCNC: 85 MG/DL — SIGNIFICANT CHANGE UP (ref 70–99)
GLUCOSE BLDC GLUCOMTR-MCNC: 96 MG/DL — SIGNIFICANT CHANGE UP (ref 70–99)
HCT VFR BLD CALC: 32.4 % — LOW (ref 34.5–45)
HGB BLD-MCNC: 10.3 G/DL — LOW (ref 11.5–15.5)
MCHC RBC-ENTMCNC: 28.9 PG — SIGNIFICANT CHANGE UP (ref 27–34)
MCHC RBC-ENTMCNC: 31.8 GM/DL — LOW (ref 32–36)
MCV RBC AUTO: 91 FL — SIGNIFICANT CHANGE UP (ref 80–100)
PLATELET # BLD AUTO: 479 K/UL — HIGH (ref 150–400)
RBC # BLD: 3.57 M/UL — LOW (ref 3.8–5.2)
RBC # FLD: 16.2 % — HIGH (ref 10.3–14.5)
WBC # BLD: 10.5 K/UL — SIGNIFICANT CHANGE UP (ref 3.8–10.5)
WBC # FLD AUTO: 10.5 K/UL — SIGNIFICANT CHANGE UP (ref 3.8–10.5)

## 2019-03-29 PROCEDURE — 99232 SBSQ HOSP IP/OBS MODERATE 35: CPT | Mod: GC

## 2019-03-29 PROCEDURE — 99232 SBSQ HOSP IP/OBS MODERATE 35: CPT

## 2019-03-29 PROCEDURE — 99222 1ST HOSP IP/OBS MODERATE 55: CPT

## 2019-03-29 RX ORDER — ACETAMINOPHEN 500 MG
1000 TABLET ORAL ONCE
Qty: 0 | Refills: 0 | Status: COMPLETED | OUTPATIENT
Start: 2019-03-29 | End: 2019-03-29

## 2019-03-29 RX ORDER — SUCRALFATE 1 G
1 TABLET ORAL
Qty: 0 | Refills: 0 | Status: DISCONTINUED | OUTPATIENT
Start: 2019-03-29 | End: 2019-04-09

## 2019-03-29 RX ORDER — BACITRACIN ZINC 500 UNIT/G
1 OINTMENT IN PACKET (EA) TOPICAL ONCE
Qty: 0 | Refills: 0 | Status: COMPLETED | OUTPATIENT
Start: 2019-03-29 | End: 2019-03-29

## 2019-03-29 RX ORDER — INSULIN GLARGINE 100 [IU]/ML
15 INJECTION, SOLUTION SUBCUTANEOUS ONCE
Qty: 0 | Refills: 0 | Status: COMPLETED | OUTPATIENT
Start: 2019-03-29 | End: 2019-03-29

## 2019-03-29 RX ORDER — PRIMIDONE 250 MG/1
50 TABLET ORAL DAILY
Qty: 0 | Refills: 0 | Status: DISCONTINUED | OUTPATIENT
Start: 2019-03-29 | End: 2019-04-09

## 2019-03-29 RX ORDER — PANTOPRAZOLE SODIUM 20 MG/1
40 TABLET, DELAYED RELEASE ORAL
Qty: 0 | Refills: 0 | Status: DISCONTINUED | OUTPATIENT
Start: 2019-03-29 | End: 2019-04-09

## 2019-03-29 RX ADMIN — Medication 100 MILLIGRAM(S): at 14:01

## 2019-03-29 RX ADMIN — OXYCODONE HYDROCHLORIDE 10 MILLIGRAM(S): 5 TABLET ORAL at 10:05

## 2019-03-29 RX ADMIN — Medication 100 MILLIGRAM(S): at 22:11

## 2019-03-29 RX ADMIN — OXYCODONE HYDROCHLORIDE 10 MILLIGRAM(S): 5 TABLET ORAL at 22:11

## 2019-03-29 RX ADMIN — Medication 3 MILLILITER(S): at 06:57

## 2019-03-29 RX ADMIN — Medication 200 MILLIGRAM(S): at 17:49

## 2019-03-29 RX ADMIN — OXYCODONE HYDROCHLORIDE 10 MILLIGRAM(S): 5 TABLET ORAL at 09:05

## 2019-03-29 RX ADMIN — FENTANYL CITRATE 1 PATCH: 50 INJECTION INTRAVENOUS at 06:57

## 2019-03-29 RX ADMIN — PRIMIDONE 25 MILLIGRAM(S): 250 TABLET ORAL at 14:01

## 2019-03-29 RX ADMIN — GABAPENTIN 300 MILLIGRAM(S): 400 CAPSULE ORAL at 06:57

## 2019-03-29 RX ADMIN — OXYCODONE HYDROCHLORIDE 10 MILLIGRAM(S): 5 TABLET ORAL at 07:25

## 2019-03-29 RX ADMIN — SIMVASTATIN 20 MILLIGRAM(S): 20 TABLET, FILM COATED ORAL at 22:11

## 2019-03-29 RX ADMIN — Medication 137 MICROGRAM(S): at 06:58

## 2019-03-29 RX ADMIN — PANTOPRAZOLE SODIUM 40 MILLIGRAM(S): 20 TABLET, DELAYED RELEASE ORAL at 06:58

## 2019-03-29 RX ADMIN — FENTANYL CITRATE 1 PATCH: 50 INJECTION INTRAVENOUS at 19:31

## 2019-03-29 RX ADMIN — TRAMADOL HYDROCHLORIDE 50 MILLIGRAM(S): 50 TABLET ORAL at 17:49

## 2019-03-29 RX ADMIN — Medication 20 MILLIGRAM(S): at 22:11

## 2019-03-29 RX ADMIN — INSULIN GLARGINE 20 UNIT(S): 100 INJECTION, SOLUTION SUBCUTANEOUS at 22:12

## 2019-03-29 RX ADMIN — Medication 400 MILLIGRAM(S): at 00:35

## 2019-03-29 RX ADMIN — INSULIN GLARGINE 15 UNIT(S): 100 INJECTION, SOLUTION SUBCUTANEOUS at 01:50

## 2019-03-29 RX ADMIN — Medication 1 GRAM(S): at 12:00

## 2019-03-29 RX ADMIN — Medication 200 MILLIGRAM(S): at 14:03

## 2019-03-29 RX ADMIN — Medication 600 MILLIGRAM(S): at 00:37

## 2019-03-29 RX ADMIN — Medication 3 MILLILITER(S): at 17:49

## 2019-03-29 RX ADMIN — OXYCODONE HYDROCHLORIDE 10 MILLIGRAM(S): 5 TABLET ORAL at 04:20

## 2019-03-29 RX ADMIN — Medication 10 UNIT(S): at 12:59

## 2019-03-29 RX ADMIN — Medication 1 GRAM(S): at 17:49

## 2019-03-29 RX ADMIN — Medication 20 MILLIGRAM(S): at 06:58

## 2019-03-29 RX ADMIN — Medication 1000 MILLIGRAM(S): at 01:12

## 2019-03-29 RX ADMIN — SENNA PLUS 2 TABLET(S): 8.6 TABLET ORAL at 22:11

## 2019-03-29 RX ADMIN — Medication 40 MILLIGRAM(S): at 17:48

## 2019-03-29 RX ADMIN — Medication 40 MILLIGRAM(S): at 06:58

## 2019-03-29 RX ADMIN — Medication 10 UNIT(S): at 17:48

## 2019-03-29 RX ADMIN — TRAMADOL HYDROCHLORIDE 50 MILLIGRAM(S): 50 TABLET ORAL at 07:36

## 2019-03-29 RX ADMIN — Medication 3: at 12:59

## 2019-03-29 RX ADMIN — Medication 20 MILLIGRAM(S): at 14:01

## 2019-03-29 RX ADMIN — TRAMADOL HYDROCHLORIDE 50 MILLIGRAM(S): 50 TABLET ORAL at 07:05

## 2019-03-29 RX ADMIN — GABAPENTIN 300 MILLIGRAM(S): 400 CAPSULE ORAL at 22:11

## 2019-03-29 RX ADMIN — OXYCODONE HYDROCHLORIDE 10 MILLIGRAM(S): 5 TABLET ORAL at 00:03

## 2019-03-29 RX ADMIN — PANTOPRAZOLE SODIUM 40 MILLIGRAM(S): 20 TABLET, DELAYED RELEASE ORAL at 17:49

## 2019-03-29 RX ADMIN — OXYCODONE HYDROCHLORIDE 10 MILLIGRAM(S): 5 TABLET ORAL at 15:04

## 2019-03-29 RX ADMIN — GABAPENTIN 300 MILLIGRAM(S): 400 CAPSULE ORAL at 14:01

## 2019-03-29 RX ADMIN — Medication 10 UNIT(S): at 09:04

## 2019-03-29 RX ADMIN — Medication 20 MILLIGRAM(S): at 07:06

## 2019-03-29 RX ADMIN — Medication 100 MILLIGRAM(S): at 06:59

## 2019-03-29 RX ADMIN — PRIMIDONE 50 MILLIGRAM(S): 250 TABLET ORAL at 17:49

## 2019-03-29 RX ADMIN — OXYCODONE HYDROCHLORIDE 10 MILLIGRAM(S): 5 TABLET ORAL at 14:04

## 2019-03-29 RX ADMIN — Medication 1 APPLICATION(S): at 00:35

## 2019-03-29 RX ADMIN — Medication 3 MILLILITER(S): at 14:02

## 2019-03-29 RX ADMIN — POLYETHYLENE GLYCOL 3350 17 GRAM(S): 17 POWDER, FOR SOLUTION ORAL at 06:59

## 2019-03-29 NOTE — PROGRESS NOTE ADULT - ATTENDING COMMENTS
Patient seen and examined at bedside. Agree with assessment and plan as above. Please call [phone] for any questions or concerns.

## 2019-03-29 NOTE — CONSULT NOTE ADULT - SUBJECTIVE AND OBJECTIVE BOX
HPI:  Patient is a 63 year old female with PMHX of HTN, DM2, COPD/CHRIS on nocturnal CPAP, RA, discharged on 3/20 from [facility] after a prolonged hospitalization for SBO requiring lysis of adhesions, complicated by hypercapneic respiratory failure requiring intubation. on 3/21 (day of admission) pt awoke with shortness of breath and chest tightness, was supposed to use CPAP the night prior but but nursing facility did not have the machine. She is also being followed by rheum for RA, she has been on prednisone 10 mg for years and naproxen 500 BID, was noted to have RLE weakness, per patient is chronic but has worsened.     MEDICATIONS  (STANDING):  ALBUTerol/ipratropium for Nebulization 3 milliLiter(s) Nebulizer every 6 hours  buDESOnide  80 MICROgram(s)/formoterol 4.5 MICROgram(s) Inhaler 2 Puff(s) Inhalation two times a day  dextrose 5%. 1000 milliLiter(s) (50 mL/Hr) IV Continuous <Continuous>  dextrose 50% Injectable 12.5 Gram(s) IV Push once  dextrose 50% Injectable 25 Gram(s) IV Push once  dextrose 50% Injectable 25 Gram(s) IV Push once  docusate sodium 100 milliGRAM(s) Oral three times a day  enoxaparin Injectable 40 milliGRAM(s) SubCutaneous every 24 hours  furosemide    Tablet 40 milliGRAM(s) Oral two times a day  gabapentin 300 milliGRAM(s) Oral three times a day  guaiFENesin   Syrup  (Sugar-Free) 200 milliGRAM(s) Oral every 6 hours  influenza   Vaccine 0.5 milliLiter(s) IntraMuscular once  insulin glargine Injectable (LANTUS) 20 Unit(s) SubCutaneous at bedtime  insulin lispro (HumaLOG) corrective regimen sliding scale   SubCutaneous three times a day before meals  insulin lispro Injectable (HumaLOG) 10 Unit(s) SubCutaneous three times a day before meals  levothyroxine 137 MICROGram(s) Oral daily  pantoprazole    Tablet 40 milliGRAM(s) Oral before breakfast  polyethylene glycol 3350 17 Gram(s) Oral two times a day  predniSONE   Tablet 20 milliGRAM(s) Oral daily  primidone 25 milliGRAM(s) Oral daily  senna 2 Tablet(s) Oral at bedtime  sildenafil (REVATIO) 20 milliGRAM(s) Oral three times a day  simvastatin 20 milliGRAM(s) Oral at bedtime  traMADol 50 milliGRAM(s) Oral two times a day    MEDICATIONS  (PRN):  dextrose 40% Gel 15 Gram(s) Oral once PRN Blood Glucose LESS THAN 70 milliGRAM(s)/deciliter  glucagon  Injectable 1 milliGRAM(s) IntraMuscular once PRN Glucose LESS THAN 70 milligrams/deciliter  ondansetron Injectable 4 milliGRAM(s) IV Push every 6 hours PRN Nausea  oxyCODONE    IR 10 milliGRAM(s) Oral every 4 hours PRN Moderate Pain (4 - 6)  sodium chloride 0.65% Nasal 1 Spray(s) Both Nostrils every 4 hours PRN Nasal Congestion    PAST MEDICAL & SURGICAL HISTORY:  Diverticulosis  RA (Rheumatoid Arthritis)  Tooth Abscess  Arthritis  Cigarette Smoker  Chronic Asthma  Adult Hypothyroidism  Borderline Diabetes Mellitus  High Cholesterol  H/O: HTN  Wrist Disorder: S/P Surgery  History of  Section    FAMILY HISTORY:  No pertinent family history in first degree relatives    Allergies    No Known Allergies    Intolerances    Seroquel (Other)      SHx - No smoking, No ETOH, No drug abuse    Review of Systems:  CONSTITUTIONAL:  No weight loss, fever, chills, weakness or fatigue.  HEENT:  Eyes:  No visual loss, blurred vision, double vision or yellow sclerae. Ears, Nose, Throat:  No hearing loss, sneezing, congestion, runny nose or sore throat.  CARDIOVASCULAR:  No chest pain, chest pressure or chest discomfort. No palpitations or edema.  GASTROINTESTINAL:  No anorexia, nausea, vomiting or diarrhea. No abdominal pain or blood.  NEUROLOGICAL: See HPI  MUSCULOSKELETAL:  No muscle, back pain, joint pain or stiffness.  PSYCHIATRIC:  No history of depression or anxiety.    Vital Signs Last 24 Hrs  T(C): 37 (29 Mar 2019 00:25), Max: 37.2 (28 Mar 2019 19:04)  T(F): 98.6 (29 Mar 2019 00:25), Max: 99 (28 Mar 2019 19:04)  HR: 95 (29 Mar 2019 05:33) (94 - 102)  BP: 142/85 (29 Mar 2019 00:25) (116/81 - 142/85)  BP(mean): --  RR: 16 (29 Mar 2019 00:25) (16 - 20)  SpO2: 95% (29 Mar 2019 05:33) (94% - 99%)    General Exam:   General appearance: No acute distress                 Neurological Exam:  Mental Status: Orientated to self, date and place.    No dysarthria, aphasia or neglect.      Cranial Nerves: CN I - not tested.  PERRL, EOMI, VFF, no nystagmus or diplopia.    No facial asymmetry.  Hearing intact to finger rub bilaterally.     Motor:   Tone: normal.                  Strength:     [] Upper extremity                      Delt       Bicep    Tricep                                                  R                                                        L            [] Lower extremity                       HF          KE          KF        DF         PF                                               R                                                       L           Pronator drift: none                 Dysmetria: BL NL FTN  No truncal ataxia.    Tremor: No resting, postural or action tremor.  No myoclonus.    Sensation: intact to light touch, pinprick, vibration and proprioception    Deep Tendon Reflexes:   Right 2+ : BC, TC, BRD   Left 2+ : BC, TC, BRD  Right 2+ Knee, 1+ ankle  Left 2+ Knee, 1+ ankle    Toes flexor bilaterally  Gait: normal and stable.      Other:  Radiology  CT  MRI  EKG:  tele:  TTE:  EEG: HPI:  Patient is a 63 year old female with PMHX of HTN, DM2, COPD/CHRIS on nocturnal CPAP, RA, discharged on 3/20 from [facility] after a prolonged hospitalization for SBO requiring lysis of adhesions, complicated by hypercapneic respiratory failure requiring intubation. on 3/21 (day of admission) pt awoke with shortness of breath and chest tightness, was supposed to use CPAP the night prior but but nursing facility did not have the machine. She is also being followed by rheum for RA, she has been on prednisone 10 mg for years and naproxen 500 BID, was noted to have RLE weakness, per patient is chronic but has worsened. Of note, patient was evaluated by neurology at [facility] for tremor in bilateral UE and RLE, unable to r/o parkinsons at the time, exam limited to hand atrophy and habitus, was suggested to start primidone 25 daily and Follow up with outpatient neuro. Patient states she's had the tremors for a long time and her siblings have them also, no formal diagnosis or etiology of these tremors.     MEDICATIONS  (STANDING):  ALBUTerol/ipratropium for Nebulization 3 milliLiter(s) Nebulizer every 6 hours  buDESOnide  80 MICROgram(s)/formoterol 4.5 MICROgram(s) Inhaler 2 Puff(s) Inhalation two times a day  dextrose 5%. 1000 milliLiter(s) (50 mL/Hr) IV Continuous <Continuous>  dextrose 50% Injectable 12.5 Gram(s) IV Push once  dextrose 50% Injectable 25 Gram(s) IV Push once  dextrose 50% Injectable 25 Gram(s) IV Push once  docusate sodium 100 milliGRAM(s) Oral three times a day  enoxaparin Injectable 40 milliGRAM(s) SubCutaneous every 24 hours  furosemide    Tablet 40 milliGRAM(s) Oral two times a day  gabapentin 300 milliGRAM(s) Oral three times a day  guaiFENesin   Syrup  (Sugar-Free) 200 milliGRAM(s) Oral every 6 hours  influenza   Vaccine 0.5 milliLiter(s) IntraMuscular once  insulin glargine Injectable (LANTUS) 20 Unit(s) SubCutaneous at bedtime  insulin lispro (HumaLOG) corrective regimen sliding scale   SubCutaneous three times a day before meals  insulin lispro Injectable (HumaLOG) 10 Unit(s) SubCutaneous three times a day before meals  levothyroxine 137 MICROGram(s) Oral daily  pantoprazole    Tablet 40 milliGRAM(s) Oral before breakfast  polyethylene glycol 3350 17 Gram(s) Oral two times a day  predniSONE   Tablet 20 milliGRAM(s) Oral daily  primidone 25 milliGRAM(s) Oral daily  senna 2 Tablet(s) Oral at bedtime  sildenafil (REVATIO) 20 milliGRAM(s) Oral three times a day  simvastatin 20 milliGRAM(s) Oral at bedtime  traMADol 50 milliGRAM(s) Oral two times a day    MEDICATIONS  (PRN):  dextrose 40% Gel 15 Gram(s) Oral once PRN Blood Glucose LESS THAN 70 milliGRAM(s)/deciliter  glucagon  Injectable 1 milliGRAM(s) IntraMuscular once PRN Glucose LESS THAN 70 milligrams/deciliter  ondansetron Injectable 4 milliGRAM(s) IV Push every 6 hours PRN Nausea  oxyCODONE    IR 10 milliGRAM(s) Oral every 4 hours PRN Moderate Pain (4 - 6)  sodium chloride 0.65% Nasal 1 Spray(s) Both Nostrils every 4 hours PRN Nasal Congestion    PAST MEDICAL & SURGICAL HISTORY:  Diverticulosis  RA (Rheumatoid Arthritis)  Tooth Abscess  Arthritis  Cigarette Smoker  Chronic Asthma  Adult Hypothyroidism  Borderline Diabetes Mellitus  High Cholesterol  H/O: HTN  Wrist Disorder: S/P Surgery  History of  Section    FAMILY HISTORY:  No pertinent family history in first degree relatives    Allergies  No Known Allergies    Intolerances  Seroquel (Other)    Review of Systems:  CONSTITUTIONAL:  No weight loss, fever, chills, weakness or fatigue.  HEENT:  Eyes:  No visual loss, blurred vision, double vision or yellow sclerae. Ears, Nose, Throat:  No hearing loss, sneezing, congestion, runny nose or sore throat.  CARDIOVASCULAR:  No chest pain, chest pressure or chest discomfort. No palpitations or edema.  GASTROINTESTINAL:  No anorexia, nausea, vomiting or diarrhea. No abdominal pain or blood.  NEUROLOGICAL: See HPI  MUSCULOSKELETAL:  No muscle, back pain, joint pain or stiffness.  PSYCHIATRIC:  No history of depression or anxiety.    Vital Signs Last 24 Hrs  T(C): 37 (29 Mar 2019 00:25), Max: 37.2 (28 Mar 2019 19:04)  T(F): 98.6 (29 Mar 2019 00:25), Max: 99 (28 Mar 2019 19:04)  HR: 95 (29 Mar 2019 05:33) (94 - 102)  BP: 142/85 (29 Mar 2019 00:25) (116/81 - 142/85)  BP(mean): --  RR: 16 (29 Mar 2019 00:25) (16 - 20)  SpO2: 95% (29 Mar 2019 05:33) (94% - 99%)    General Exam:   General appearance: No acute distress                 Neurological Exam:  Mental Status: Orientated to self, date and place.    No dysarthria, aphasia or neglect.      Cranial Nerves: CN I - not tested.  PERRL, EOMI, VFF, no nystagmus or diplopia.  No facial asymmetry.  Hearing intact to finger rub bilaterally.     Motor:   Tone: normal.                  Strength:limited due to habitus in LE, 4+/5 in UE, HF, HE ~2-3/6 bilaterally, 4/5 in RLE dorsi and plantarflexion, 2/5 in LLE dorsiflexion and plantarflection  Pronator drift: none                 Dysmetria: BL NL FTN  No truncal ataxia.    Tremor: + resting tremor which improves when distracted.  No myoclonus.  Sensation: intact to light touch and pinprick except for LLE dorsum decreased sensation to pinprick and touch (10% compared to 100% on other extremities)  Deep Tendon Reflexes: symmetrically diminished    Toes flexor bilaterally  Gait: normal and stable.      Other:  Radiology  CT  MRI  EKG:  tele:  TTE:  EEG: HPI:  Patient is a 63 year old female with PMHX of HTN, DM2, COPD/CHRIS on nocturnal CPAP, RA, discharged on 3/20 from [facility] after a prolonged hospitalization for SBO requiring lysis of adhesions, complicated by hypercapneic respiratory failure requiring intubation. on 3/21 (day of admission) pt awoke with shortness of breath and chest tightness, was supposed to use CPAP the night prior but but nursing facility did not have the machine. She is also being followed by rheum for RA, she has been on prednisone 10 mg for years and naproxen 500 BID, was noted to have RLE weakness, per patient is chronic but has worsened. Of note, patient was evaluated by neurology at [facility] for tremor in bilateral UE and RLE, unable to r/o parkinsons at the time, exam limited to hand atrophy and habitus, was suggested to start primidone 25 daily and Follow up with outpatient neuro. Patient states she's had the tremors for a long time and her siblings have them also, no formal diagnosis or etiology of these tremors.     MEDICATIONS  (STANDING):  ALBUTerol/ipratropium for Nebulization 3 milliLiter(s) Nebulizer every 6 hours  buDESOnide  80 MICROgram(s)/formoterol 4.5 MICROgram(s) Inhaler 2 Puff(s) Inhalation two times a day  dextrose 5%. 1000 milliLiter(s) (50 mL/Hr) IV Continuous <Continuous>  dextrose 50% Injectable 12.5 Gram(s) IV Push once  dextrose 50% Injectable 25 Gram(s) IV Push once  dextrose 50% Injectable 25 Gram(s) IV Push once  docusate sodium 100 milliGRAM(s) Oral three times a day  enoxaparin Injectable 40 milliGRAM(s) SubCutaneous every 24 hours  furosemide    Tablet 40 milliGRAM(s) Oral two times a day  gabapentin 300 milliGRAM(s) Oral three times a day  guaiFENesin   Syrup  (Sugar-Free) 200 milliGRAM(s) Oral every 6 hours  influenza   Vaccine 0.5 milliLiter(s) IntraMuscular once  insulin glargine Injectable (LANTUS) 20 Unit(s) SubCutaneous at bedtime  insulin lispro (HumaLOG) corrective regimen sliding scale   SubCutaneous three times a day before meals  insulin lispro Injectable (HumaLOG) 10 Unit(s) SubCutaneous three times a day before meals  levothyroxine 137 MICROGram(s) Oral daily  pantoprazole    Tablet 40 milliGRAM(s) Oral before breakfast  polyethylene glycol 3350 17 Gram(s) Oral two times a day  predniSONE   Tablet 20 milliGRAM(s) Oral daily  primidone 25 milliGRAM(s) Oral daily  senna 2 Tablet(s) Oral at bedtime  sildenafil (REVATIO) 20 milliGRAM(s) Oral three times a day  simvastatin 20 milliGRAM(s) Oral at bedtime  traMADol 50 milliGRAM(s) Oral two times a day    MEDICATIONS  (PRN):  dextrose 40% Gel 15 Gram(s) Oral once PRN Blood Glucose LESS THAN 70 milliGRAM(s)/deciliter  glucagon  Injectable 1 milliGRAM(s) IntraMuscular once PRN Glucose LESS THAN 70 milligrams/deciliter  ondansetron Injectable 4 milliGRAM(s) IV Push every 6 hours PRN Nausea  oxyCODONE    IR 10 milliGRAM(s) Oral every 4 hours PRN Moderate Pain (4 - 6)  sodium chloride 0.65% Nasal 1 Spray(s) Both Nostrils every 4 hours PRN Nasal Congestion    PAST MEDICAL & SURGICAL HISTORY:  Diverticulosis  RA (Rheumatoid Arthritis)  Tooth Abscess  Arthritis  Cigarette Smoker  Chronic Asthma  Adult Hypothyroidism  Borderline Diabetes Mellitus  High Cholesterol  H/O: HTN  Wrist Disorder: S/P Surgery  History of  Section    FAMILY HISTORY:  No pertinent family history in first degree relatives    Allergies  No Known Allergies    Intolerances  Seroquel (Other)    Review of Systems:  CONSTITUTIONAL:  No weight loss, fever, chills, weakness or fatigue.  HEENT:  Eyes:  No visual loss, blurred vision, double vision or yellow sclerae. Ears, Nose, Throat:  No hearing loss, sneezing, congestion, runny nose or sore throat.  CARDIOVASCULAR:  No chest pain, chest pressure or chest discomfort. No palpitations or edema.  GASTROINTESTINAL:  No anorexia, nausea, vomiting or diarrhea. No abdominal pain or blood.  NEUROLOGICAL: See HPI  MUSCULOSKELETAL:  No muscle, back pain, joint pain or stiffness.  PSYCHIATRIC:  No history of depression or anxiety.    Vital Signs Last 24 Hrs  T(C): 37 (29 Mar 2019 00:25), Max: 37.2 (28 Mar 2019 19:04)  T(F): 98.6 (29 Mar 2019 00:25), Max: 99 (28 Mar 2019 19:04)  HR: 95 (29 Mar 2019 05:33) (94 - 102)  BP: 142/85 (29 Mar 2019 00:25) (116/81 - 142/85)  BP(mean): --  RR: 16 (29 Mar 2019 00:25) (16 - 20)  SpO2: 95% (29 Mar 2019 05:33) (94% - 99%)    General Exam:   General appearance: No acute distress                 Neurological Exam:  Mental Status: Orientated to self, date and place.    No dysarthria, aphasia or neglect.      Cranial Nerves: CN I - not tested.  PERRL, EOMI, VFF, no nystagmus or diplopia.  No facial asymmetry.  Hearing intact to finger rub bilaterally.     Motor:   Tone: normal.                  Strength:limited due to habitus in LE, 4+/5 in UE, HF, HE ~2-3/6 bilaterally, 4/5 in RLE dorsi and plantarflexion, 2/5 in LLE dorsiflexion and plantarflection  Pronator drift: none                 Dysmetria: BL NL FTN  No truncal ataxia.    Tremor: + resting tremor which improves when distracted.  No myoclonus.  Sensation: intact to light touch and pinprick except for LLE dorsum decreased sensation to pinprick and touch (10% compared to 100% on other extremities)  Deep Tendon Reflexes: symmetrically diminished    Toes flexor bilaterally  Gait: deferred HPI:  Patient is a 63 year old female with PMHX of HTN, DM2, COPD/CHRIS on nocturnal CPAP, RA, discharged on 3/20 from [facility] after a prolonged hospitalization for SBO requiring lysis of adhesions, complicated by hypercapneic respiratory failure requiring intubation. on 3/21 (day of admission) pt awoke with shortness of breath and chest tightness, was supposed to use CPAP the night prior but but nursing facility did not have the machine. She is also being followed by rheum for RA, she has been on prednisone 10 mg for years and naproxen 500 BID, was noted to have RLE weakness, per patient is chronic but has worsened. Of note, patient was evaluated by neurology at [facility] for tremor in bilateral UE and RLE, unable to r/o parkinsons at the time, exam limited to hand atrophy and habitus, was suggested to start primidone 25 daily and Follow up with outpatient neuro. Patient states she's had the tremors for a long time and her siblings have them also, no formal diagnosis or etiology of these tremors.     MEDICATIONS  (STANDING):  ALBUTerol/ipratropium for Nebulization 3 milliLiter(s) Nebulizer every 6 hours  buDESOnide  80 MICROgram(s)/formoterol 4.5 MICROgram(s) Inhaler 2 Puff(s) Inhalation two times a day  dextrose 5%. 1000 milliLiter(s) (50 mL/Hr) IV Continuous <Continuous>  dextrose 50% Injectable 12.5 Gram(s) IV Push once  dextrose 50% Injectable 25 Gram(s) IV Push once  dextrose 50% Injectable 25 Gram(s) IV Push once  docusate sodium 100 milliGRAM(s) Oral three times a day  enoxaparin Injectable 40 milliGRAM(s) SubCutaneous every 24 hours  furosemide    Tablet 40 milliGRAM(s) Oral two times a day  gabapentin 300 milliGRAM(s) Oral three times a day  guaiFENesin   Syrup  (Sugar-Free) 200 milliGRAM(s) Oral every 6 hours  influenza   Vaccine 0.5 milliLiter(s) IntraMuscular once  insulin glargine Injectable (LANTUS) 20 Unit(s) SubCutaneous at bedtime  insulin lispro (HumaLOG) corrective regimen sliding scale   SubCutaneous three times a day before meals  insulin lispro Injectable (HumaLOG) 10 Unit(s) SubCutaneous three times a day before meals  levothyroxine 137 MICROGram(s) Oral daily  pantoprazole    Tablet 40 milliGRAM(s) Oral before breakfast  polyethylene glycol 3350 17 Gram(s) Oral two times a day  predniSONE   Tablet 20 milliGRAM(s) Oral daily  primidone 25 milliGRAM(s) Oral daily  senna 2 Tablet(s) Oral at bedtime  sildenafil (REVATIO) 20 milliGRAM(s) Oral three times a day  simvastatin 20 milliGRAM(s) Oral at bedtime  traMADol 50 milliGRAM(s) Oral two times a day    MEDICATIONS  (PRN):  dextrose 40% Gel 15 Gram(s) Oral once PRN Blood Glucose LESS THAN 70 milliGRAM(s)/deciliter  glucagon  Injectable 1 milliGRAM(s) IntraMuscular once PRN Glucose LESS THAN 70 milligrams/deciliter  ondansetron Injectable 4 milliGRAM(s) IV Push every 6 hours PRN Nausea  oxyCODONE    IR 10 milliGRAM(s) Oral every 4 hours PRN Moderate Pain (4 - 6)  sodium chloride 0.65% Nasal 1 Spray(s) Both Nostrils every 4 hours PRN Nasal Congestion    PAST MEDICAL & SURGICAL HISTORY:  Diverticulosis  RA (Rheumatoid Arthritis)  Tooth Abscess  Arthritis  Cigarette Smoker  Chronic Asthma  Adult Hypothyroidism  Borderline Diabetes Mellitus  High Cholesterol  H/O: HTN  Wrist Disorder: S/P Surgery  History of  Section    FAMILY HISTORY:  No pertinent family history in first degree relatives    Allergies  No Known Allergies    Intolerances  Seroquel (Other)    Review of Systems:  CONSTITUTIONAL:  No weight loss, fever, chills, weakness or fatigue.  HEENT:  Eyes:  No visual loss, blurred vision, double vision or yellow sclerae. Ears, Nose, Throat:  No hearing loss, sneezing, congestion, runny nose or sore throat.  CARDIOVASCULAR:  No chest pain, chest pressure or chest discomfort. No palpitations or edema.  GASTROINTESTINAL:  No anorexia, nausea, vomiting or diarrhea. No abdominal pain or blood.  NEUROLOGICAL: See HPI  MUSCULOSKELETAL:  No muscle, back pain, joint pain or stiffness.  PSYCHIATRIC:  No history of depression or anxiety.    Vital Signs Last 24 Hrs  T(C): 37 (29 Mar 2019 00:25), Max: 37.2 (28 Mar 2019 19:04)  T(F): 98.6 (29 Mar 2019 00:25), Max: 99 (28 Mar 2019 19:04)  HR: 95 (29 Mar 2019 05:33) (94 - 102)  BP: 142/85 (29 Mar 2019 00:25) (116/81 - 142/85)  BP(mean): --  RR: 16 (29 Mar 2019 00:25) (16 - 20)  SpO2: 95% (29 Mar 2019 05:33) (94% - 99%)    General Exam:   General appearance: No acute distress                 Neurological Exam:  Mental Status: Orientated to self, date and place.    No dysarthria, aphasia or neglect.      Cranial Nerves: CN I - not tested.  PERRL, EOMI, VFF, no nystagmus or diplopia.  No facial asymmetry.  Hearing intact to finger rub bilaterally.     Motor:   Tone: normal.                  Strength:limited due to habitus in LE, 4+/5 in UE, HF, HE ~2-3/6 bilaterally, 4/5 in RLE dorsi and plantarflexion, 2/5 in RLE dorsiflexion and plantarflection  Pronator drift: none                 Dysmetria: BL NL FTN  No truncal ataxia.    Tremor: + resting tremor which improves when distracted.  No myoclonus.  Sensation: intact to light touch and pinprick except for LLE dorsum decreased sensation to pinprick and touch (10% compared to 100% on other extremities)  Deep Tendon Reflexes: symmetrically diminished    Toes flexor bilaterally  Gait: deferred

## 2019-03-29 NOTE — PROGRESS NOTE ADULT - SUBJECTIVE AND OBJECTIVE BOX
CC: Patient is a 63y old  Female who presents with a chief complaint of sob (29 Mar 2019 11:33)    ID following for PNA    Interval History/ROS: Patient feeling better today. Remains with right leg pain. Denies fever, chills.    Rest of ROS negative.    Allergies  No Known Allergies    ANTIMICROBIALS:      OTHER MEDS:  ALBUTerol/ipratropium for Nebulization 3 milliLiter(s) Nebulizer every 6 hours  buDESOnide  80 MICROgram(s)/formoterol 4.5 MICROgram(s) Inhaler 2 Puff(s) Inhalation two times a day  dextrose 40% Gel 15 Gram(s) Oral once PRN  dextrose 5%. 1000 milliLiter(s) IV Continuous <Continuous>  dextrose 50% Injectable 12.5 Gram(s) IV Push once  dextrose 50% Injectable 25 Gram(s) IV Push once  dextrose 50% Injectable 25 Gram(s) IV Push once  docusate sodium 100 milliGRAM(s) Oral three times a day  enoxaparin Injectable 40 milliGRAM(s) SubCutaneous every 24 hours  furosemide    Tablet 40 milliGRAM(s) Oral two times a day  gabapentin 300 milliGRAM(s) Oral three times a day  glucagon  Injectable 1 milliGRAM(s) IntraMuscular once PRN  guaiFENesin   Syrup  (Sugar-Free) 200 milliGRAM(s) Oral every 6 hours  influenza   Vaccine 0.5 milliLiter(s) IntraMuscular once  insulin glargine Injectable (LANTUS) 20 Unit(s) SubCutaneous at bedtime  insulin lispro (HumaLOG) corrective regimen sliding scale   SubCutaneous three times a day before meals  insulin lispro Injectable (HumaLOG) 10 Unit(s) SubCutaneous three times a day before meals  levothyroxine 137 MICROGram(s) Oral daily  ondansetron Injectable 4 milliGRAM(s) IV Push every 6 hours PRN  oxyCODONE    IR 10 milliGRAM(s) Oral every 4 hours PRN  pantoprazole    Tablet 40 milliGRAM(s) Oral two times a day  polyethylene glycol 3350 17 Gram(s) Oral two times a day  predniSONE   Tablet 20 milliGRAM(s) Oral daily  primidone 25 milliGRAM(s) Oral daily  senna 2 Tablet(s) Oral at bedtime  sildenafil (REVATIO) 20 milliGRAM(s) Oral three times a day  simvastatin 20 milliGRAM(s) Oral at bedtime  sodium chloride 0.65% Nasal 1 Spray(s) Both Nostrils every 4 hours PRN  sucralfate 1 Gram(s) Oral four times a day  traMADol 50 milliGRAM(s) Oral two times a day    PE:    Vital Signs Last 24 Hrs  T(C): 36.5 (29 Mar 2019 07:08), Max: 37.2 (28 Mar 2019 19:04)  T(F): 97.7 (29 Mar 2019 07:08), Max: 99 (28 Mar 2019 19:04)  HR: 94 (29 Mar 2019 10:31) (86 - 102)  BP: 134/78 (29 Mar 2019 07:08) (116/81 - 142/85)  BP(mean): --  RR: 18 (29 Mar 2019 07:08) (16 - 20)  SpO2: 95% (29 Mar 2019 10:31) (94% - 100%)    Gen: AOx3, NAD, non-toxic  CV: S1+S2 normal, no murmurs  Resp: Clear bilat, no resp distress  Abd: Soft, nontender, +BS  Ext: No LE edema, no wounds  : No Antoine  IV/Skin: No thrombophlebitis  Neuro: no focal deficits    LABS:                          10.3   10.5  )-----------( 479      ( 29 Mar 2019 09:31 )             32.4       03-28    136  |  95<L>  |  19  ----------------------------<  197<H>  4.2   |  24  |  0.71    Ca    9.6      28 Mar 2019 09:39            MICROBIOLOGY:  v  .Blood Blood  03-22-19   No growth at 5 days.  --  --      URINE MIDSTREAM  03-07-19 --  --  --      BLOOD PERIPHERAL  03-06-19 --  --  --      BLOOD PERIPHERAL  03-05-19 --  --  --      BLOOD  03-02-19 --  --  --      BLOOD VENOUS  02-28-19 --  --  --    RADIOLOGY:    < from: [facility] Duplex Lower Ext Vein Scan, Bilat (03.28.19 @ 15:07) >  IMPRESSION:     No evidence of bilateral proximal lower extremity deep venous thrombosis.   Bilateral calf veins not visualized and therefore not evaluated due to   swelling and patient's body habitus.      < end of copied text >

## 2019-03-29 NOTE — DISCHARGE NOTE PROVIDER - CARE PROVIDER_API CALL
Nabor Lowe (MD)  Surgery  [address] [facility]  [address], [address]  [place] [address]  Phone: [phone]  Fax: [phone]  Follow Up Time:     Diane Colvin; PhD)  Neurological Surgery  [address], [address]  [place] [address]  Phone: [phone]  Fax: [phone]  Follow Up Time:     Lisker, Gita N (MD)  Medicine  Pulmonary Medicine  [address], [address]  [place] [address]  Phone: [phone]  Fax: [phone]  Follow Up Time:     Nii Quintero)  Internal Medicine; Rheumatology  [address] [place] [address]  Phone: [phone]  Fax: [phone]  Follow Up Time:

## 2019-03-29 NOTE — DISCHARGE NOTE PROVIDER - HOSPITAL COURSE
63yof w/ HTN, DM2, COPD/CHRIS on nocturnal CPAP, discharged from [facility] after a prolonged hospitalization for SBO requiring lysis of adhesions, complicated by hypercapneic respiratory failure requiring intubation. originally admitted on 12/15 for SBO s/p ex lap with lysis of adhesions (4 retention sutures) c/b 12/30 she eviscerated 2/2 a coughing episode s/p ex lap now admitted    with PNA s/p IV antibiotic. Hospital course patient reported RA flare, Rheumatology consulted, medications adjusted. Patient reported right foot drop with weakness --MR L spine  L5-S1 thecal sac compression with severe narrowing which may explain R foot drop - NeuroSx consulted, no surgical intervention at this time, c/w primidone to 50 BID, EMG may be pursued as outpatient given 1 month period and stable presentation. PT/OT. - ct reviewed with large diastases of the rectus abdominis muscle with herniation of small and large bowel into a large ventral incisional hernia... surgery consulted, out patient follow-up for elevative surgery repair.

## 2019-03-29 NOTE — PROGRESS NOTE ADULT - ASSESSMENT
63 F hx of RA, COPD/CHRIS on nocturnal CPAP, admitted for multilobar PNA, presents w multiple warm, painful joints. Difficult to assess synovial effusions 2/2 to body habitus, does have several tender, warm joints; likely 2/2 to RA flare  Evidence of subluxations on examination. Patient with new onset right foot drop    RECOMMEND  1) Joint pain improving on steroids. Please give 20mg for 2 more days, then taper to 15mg for 5days (Until April 5th) then back to her baseline of 10mg per day  2) Consider increasing tramadol patient's home dose to 300mg ER   3) Rheum will sign off. Please call with if any further questions 63 F hx of RA, COPD/CHRIS on nocturnal CPAP, admitted for multilobar PNA, presents w multiple warm, painful joints. Difficult to assess synovial effusions 2/2 to body habitus, does have several tender, warm joints; likely 2/2 to RA flare  Evidence of subluxations on examination. Patient with new onset right foot drop    RECOMMEND  1) Joint pain improving on steroids. Please give 20mg for 2 more days, then taper to 15mg for 5days (Until April 5th) then back to her baseline of 10mg per day  2) Consider increasing tramadol patient's home dose to 300mg ER   3) Patient should see her outpatient rheumatologist within 1 week of discharge.  4) Ordered B/L knee and hip xrays. Rheum will f/u results 63 F hx of RA, COPD/CHRIS on nocturnal CPAP, admitted for multilobar PNA, presents w multiple warm, painful joints. Difficult to assess synovial effusions 2/2 to body habitus, does have several tender, warm joints; likely 2/2 to RA flare  Evidence of subluxations on examination. Patient with new onset right foot drop    RECOMMEND  1) Joint pain improving on steroids. Please give 20mg for 2 more days, then taper to 15mg for 5days (Until April 5th) then back to her baseline of 10mg per day  2) Consider increasing tramadol patient's home dose to 300mg ER   3) Patient should see her outpatient rheumatologist within 1 week of discharge.  4) Ordered B/L knee and hip xrays. Rheum will f/u results   5) Also needs f/u CT chest to ensure resolution of the patchy GGOs to r/o ILD

## 2019-03-29 NOTE — PROGRESS NOTE ADULT - ASSESSMENT
63yof w/ HTN, DM2, COPD/CHRIS on nocturnal CPAP, discharged from [facility] after a prolonged hospitalization for SBO requiring lysis of adhesions, complicated by hypercapneic respiratory failure requiring intubation. Pt awoke with shortness of breath and chest tightness, was supposed to use CPAP last night but nursing facility did not have the machine. Pt oxygen dependent on 4L at baseline. Feeling better now with supplemental oxygen. No fevers or chills. Occasional cough.  originally admitted on 12/15 for SBO s/p ex lap with lysis of adhesions (4 retention sutures) c/b 12/30 she eviscerated 2/2 a coughing episode s/p ex lap (6 retention sutures) c/b multifocal pneumonia, developed hypercapnic respiratory failure requiring intubation 1/6, s/p bronch 1/9/18, and extubated on 1/24, course further c/b Enterococci bacteremia now readmitted to MICU for hypoxic respiratory failure 2/2 atelectasis requiring intubation 01/31. Had grown ESBL in urine, s/p ertapenem.  Extubated to high flow/ BIPAP.    Recent TTE with concentric LVH, though normal LA size - unclear if element of diastolic CHF. Suspect overead of portable CXR. PE is more suggestive of COPD with prolonged exp and some wheezint noted. There is no sign peripheral edema

## 2019-03-29 NOTE — PROGRESS NOTE ADULT - SUBJECTIVE AND OBJECTIVE BOX
SUBJECTIVE: Patient reports that her bilateral ankle and knee pain has improved. She is able to bend her ankle and knee joints. Patient reports new onset left pinky burning sensation.     ROS: otherwise negative     OBJECTIVE:    VITAL SIGNS:  T(C): 36.5 (29 Mar 2019 07:08), Max: 37.2 (28 Mar 2019 19:04)  T(F): 97.7 (29 Mar 2019 07:08), Max: 99 (28 Mar 2019 19:04)  HR: 94 (29 Mar 2019 10:31) (86 - 102)  BP: 134/78 (29 Mar 2019 07:08) (116/81 - 142/85)  RR: 18 (29 Mar 2019 07:08) (16 - 20)  SpO2: 95% (29 Mar 2019 10:31) (94% - 100%)    03-28 @ 07:01  -  03-29 @ 07:00  --------------------------------------------------------  IN: 420 mL / OUT: 200 mL / NET: 220 mL    03-29 @ 07:01  -  03-29 @ 15:21  --------------------------------------------------------  IN: 300 mL / OUT: 0 mL / NET: 300 mL    CAPILLARY BLOOD GLUCOSE    POCT Blood Glucose.: 285 mg/dL (29 Mar 2019 12:10)    Physical Exam:  General: Obese, no acute distress.   HEENT: no oral ulcers  CVS: +S1/S2, RRR  Resp: CTA b/l, no w, c  GI: Soft, NT/ND  MSK: B/L ankles nontender to palpation or passive motion. 2/5 strength in plantar and dorsiflexion in right foot. 4/5 strength in all other extremities. B/L    Neuro: All extremities tremulous   Skin: no  rashes    MEDICATIONS:  MEDICATIONS  (STANDING):  ALBUTerol/ipratropium for Nebulization 3 milliLiter(s) Nebulizer every 6 hours  buDESOnide  80 MICROgram(s)/formoterol 4.5 MICROgram(s) Inhaler 2 Puff(s) Inhalation two times a day  dextrose 5%. 1000 milliLiter(s) (50 mL/Hr) IV Continuous <Continuous>  dextrose 50% Injectable 12.5 Gram(s) IV Push once  dextrose 50% Injectable 25 Gram(s) IV Push once  dextrose 50% Injectable 25 Gram(s) IV Push once  docusate sodium 100 milliGRAM(s) Oral three times a day  enoxaparin Injectable 40 milliGRAM(s) SubCutaneous every 24 hours  furosemide    Tablet 40 milliGRAM(s) Oral two times a day  gabapentin 300 milliGRAM(s) Oral three times a day  guaiFENesin   Syrup  (Sugar-Free) 200 milliGRAM(s) Oral every 6 hours  influenza   Vaccine 0.5 milliLiter(s) IntraMuscular once  insulin glargine Injectable (LANTUS) 20 Unit(s) SubCutaneous at bedtime  insulin lispro (HumaLOG) corrective regimen sliding scale   SubCutaneous three times a day before meals  insulin lispro Injectable (HumaLOG) 10 Unit(s) SubCutaneous three times a day before meals  levothyroxine 137 MICROGram(s) Oral daily  pantoprazole    Tablet 40 milliGRAM(s) Oral two times a day  polyethylene glycol 3350 17 Gram(s) Oral two times a day  predniSONE   Tablet 20 milliGRAM(s) Oral daily  primidone 50 milliGRAM(s) Oral daily  senna 2 Tablet(s) Oral at bedtime  sildenafil (REVATIO) 20 milliGRAM(s) Oral three times a day  simvastatin 20 milliGRAM(s) Oral at bedtime  sucralfate 1 Gram(s) Oral four times a day  traMADol 50 milliGRAM(s) Oral two times a day    MEDICATIONS  (PRN):  dextrose 40% Gel 15 Gram(s) Oral once PRN Blood Glucose LESS THAN 70 milliGRAM(s)/deciliter  glucagon  Injectable 1 milliGRAM(s) IntraMuscular once PRN Glucose LESS THAN 70 milligrams/deciliter  ondansetron Injectable 4 milliGRAM(s) IV Push every 6 hours PRN Nausea  oxyCODONE    IR 10 milliGRAM(s) Oral every 4 hours PRN Moderate Pain (4 - 6)  sodium chloride 0.65% Nasal 1 Spray(s) Both Nostrils every 4 hours PRN Nasal Congestion    ALLERGIES:  Allergies    No Known Allergies    Intolerances    Seroquel (Other)    LABS:                        10.3   10.5  )-----------( 479      ( 29 Mar 2019 09:31 )             32.4     03-28    136  |  95<L>  |  19  ----------------------------<  197<H>  4.2   |  24  |  0.71    Ca    9.6      28 Mar 2019 09:39            RADIOLOGY & ADDITIONAL TESTS: Reviewed. SUBJECTIVE: Patient reports that her bilateral ankle and knee pain has improved. She is able to bend her ankle and knee joints. Patient reports left pinky burning sensation and right hip pain.    ROS: otherwise negative     OBJECTIVE:    VITAL SIGNS:  T(C): 36.5 (29 Mar 2019 07:08), Max: 37.2 (28 Mar 2019 19:04)  T(F): 97.7 (29 Mar 2019 07:08), Max: 99 (28 Mar 2019 19:04)  HR: 94 (29 Mar 2019 10:31) (86 - 102)  BP: 134/78 (29 Mar 2019 07:08) (116/81 - 142/85)  RR: 18 (29 Mar 2019 07:08) (16 - 20)  SpO2: 95% (29 Mar 2019 10:31) (94% - 100%)    03-28 @ 07:01  -  03-29 @ 07:00  --------------------------------------------------------  IN: 420 mL / OUT: 200 mL / NET: 220 mL    03-29 @ 07:01  -  03-29 @ 15:21  --------------------------------------------------------  IN: 300 mL / OUT: 0 mL / NET: 300 mL    CAPILLARY BLOOD GLUCOSE    POCT Blood Glucose.: 285 mg/dL (29 Mar 2019 12:10)    Physical Exam:  General: Obese, no acute distress.   HEENT: no oral ulcers  CVS: +S1/S2, RRR  Resp: CTA b/l, no w, c  GI: Soft, NT/ND  MSK: B/L ankles nontender to palpation or passive motion. 2/5 strength in plantar and dorsiflexion in right foot. 4/5 strength in all other extremities. B/L    Neuro: All extremities tremulous   Skin: no  rashes    MEDICATIONS:  MEDICATIONS  (STANDING):  ALBUTerol/ipratropium for Nebulization 3 milliLiter(s) Nebulizer every 6 hours  buDESOnide  80 MICROgram(s)/formoterol 4.5 MICROgram(s) Inhaler 2 Puff(s) Inhalation two times a day  dextrose 5%. 1000 milliLiter(s) (50 mL/Hr) IV Continuous <Continuous>  dextrose 50% Injectable 12.5 Gram(s) IV Push once  dextrose 50% Injectable 25 Gram(s) IV Push once  dextrose 50% Injectable 25 Gram(s) IV Push once  docusate sodium 100 milliGRAM(s) Oral three times a day  enoxaparin Injectable 40 milliGRAM(s) SubCutaneous every 24 hours  furosemide    Tablet 40 milliGRAM(s) Oral two times a day  gabapentin 300 milliGRAM(s) Oral three times a day  guaiFENesin   Syrup  (Sugar-Free) 200 milliGRAM(s) Oral every 6 hours  influenza   Vaccine 0.5 milliLiter(s) IntraMuscular once  insulin glargine Injectable (LANTUS) 20 Unit(s) SubCutaneous at bedtime  insulin lispro (HumaLOG) corrective regimen sliding scale   SubCutaneous three times a day before meals  insulin lispro Injectable (HumaLOG) 10 Unit(s) SubCutaneous three times a day before meals  levothyroxine 137 MICROGram(s) Oral daily  pantoprazole    Tablet 40 milliGRAM(s) Oral two times a day  polyethylene glycol 3350 17 Gram(s) Oral two times a day  predniSONE   Tablet 20 milliGRAM(s) Oral daily  primidone 50 milliGRAM(s) Oral daily  senna 2 Tablet(s) Oral at bedtime  sildenafil (REVATIO) 20 milliGRAM(s) Oral three times a day  simvastatin 20 milliGRAM(s) Oral at bedtime  sucralfate 1 Gram(s) Oral four times a day  traMADol 50 milliGRAM(s) Oral two times a day    MEDICATIONS  (PRN):  dextrose 40% Gel 15 Gram(s) Oral once PRN Blood Glucose LESS THAN 70 milliGRAM(s)/deciliter  glucagon  Injectable 1 milliGRAM(s) IntraMuscular once PRN Glucose LESS THAN 70 milligrams/deciliter  ondansetron Injectable 4 milliGRAM(s) IV Push every 6 hours PRN Nausea  oxyCODONE    IR 10 milliGRAM(s) Oral every 4 hours PRN Moderate Pain (4 - 6)  sodium chloride 0.65% Nasal 1 Spray(s) Both Nostrils every 4 hours PRN Nasal Congestion    ALLERGIES:  Allergies    No Known Allergies    Intolerances    Seroquel (Other)    LABS:                        10.3   10.5  )-----------( 479      ( 29 Mar 2019 09:31 )             32.4     03-28    136  |  95<L>  |  19  ----------------------------<  197<H>  4.2   |  24  |  0.71    Ca    9.6      28 Mar 2019 09:39            RADIOLOGY & ADDITIONAL TESTS: Reviewed.

## 2019-03-29 NOTE — PROGRESS NOTE ADULT - ASSESSMENT
63 year old female with HTN, DM2, COPD/CHRIS on nocturnal CPAP, discharged yesterday from [facility] after a prolonged hospitalization for SBO requiring lysis of adhesions, complicated by hypercapneic respiratory failure requiring intubation.     No fevers or chills  Occasional cough  Afebrile, leukocytosis, tachycardic  RVP negative  CTA chest with no pulmonary embolism, but with patchy opacities/ consolidations within both lower lobes and the RML which have decreased compared to prior exam. Residual patchy opacities are still noted. New patchy groundglass opacities are noted within both upper lobes.   Suspect Pneumonia  At [facility] on a recent admission, was treated for enterococcal bacteremia which cleared on vancomycin.

## 2019-03-29 NOTE — CONSULT NOTE ADULT - ASSESSMENT
[] resume primidone 25 daily 63 year old female with PMHX of HTN, DM2, COPD/CHRIS on nocturnal CPAP, RA, discharged on 3/20 from [facility] after a prolonged hospitalization for SBO requiring lysis of adhesions, complicated by hypercapneic respiratory failure requiring intubation. on 3/21 (day of admission) pt awoke with shortness of breath and chest tightness, was supposed to use CPAP the night prior but but nursing facility did not have the machine. She is also being followed by rheum for RA, she has been on prednisone 10 mg for years and naproxen 500 BID, was noted to have RLE weakness, per patient is chronic but has worsened. Of note, patient was evaluated by neurology at [facility] for tremor in bilateral UE and RLE, unable to r/o parkinsons at the time, exam limited to hand atrophy and habitus, was suggested to start primidone 25 daily and Follow up with outpatient neuro. Patient states she's had the tremors for a long time and her siblings have them also, no formal diagnosis or etiology of these tremors.   Neurologic exam is notable for strength deficits: 4+/5 in UE, HF, HE ~2-3/6 bilaterally, 4/5 in RLE dorsi and plantarflexion, 2/5 in LLE dorsiflexion and plantarflexion, sensation deficits: intact to light touch and pinprick except for LLE dorsum decreased sensation to pinprick and touch (10% compared to 100% on other extremities, does not fit any specific dermatome), DTR are symmetrically diminished. Lower extremity venous duplex (bilateral) is negative, however patient has pain in the right aspect of her RLE.   Impression: R foot weakness/drop is likely multifactorial, likely due to combination of arthritis, osteoporosis from chronic steroid use, and lack of mobility. tremors: cannot r/o parkinsonian tremors.    Plan   [] uptitrate primidone - will d/w attending appropriate dosage for worsening tremor  [] EMG as outpatient   [] will d/w attending  [] continue rest of management per primary team 63 year old female with PMHX of HTN, DM2, COPD/CHRIS on nocturnal CPAP, RA, discharged on 3/20 from [facility] after a prolonged hospitalization for SBO requiring lysis of adhesions, complicated by hypercapneic respiratory failure requiring intubation. on 3/21 (day of admission) pt awoke with shortness of breath and chest tightness, was supposed to use CPAP the night prior but but nursing facility did not have the machine. She is also being followed by rheum for RA, she has been on prednisone 10 mg for years and naproxen 500 BID, was noted to have RLE weakness, per patient is chronic but has worsened. Of note, patient was evaluated by neurology at [facility] for tremor in bilateral UE and RLE, unable to r/o parkinsons at the time, exam limited to hand atrophy and habitus, was suggested to start primidone 25 daily and Follow up with outpatient neuro. Patient states she's had the tremors for a long time and her siblings have them also, no formal diagnosis or etiology of these tremors.   Neurologic exam is notable for strength deficits: 4+/5 in UE, HF, HE ~2-3/6 bilaterally, 4/5 in RLE dorsi and plantarflexion, 2/5 in RLE dorsiflexion and plantarflexion, sensation deficits: intact to light touch and pinprick except for LLE dorsum decreased sensation to pinprick and touch (10% compared to 100% on other extremities, does not fit any specific dermatome), DTR are symmetrically diminished. Lower extremity venous duplex (bilateral) is negative, however patient has pain in the right aspect of her RLE.   Impression: R foot weakness/drop is likely multifactorial, likely due to combination of arthritis, osteoporosis from chronic steroid use, and lack of mobility. tremors: cannot r/o parkinsonian tremors.    Plan   [] uptitrate primidone - will d/w attending appropriate dosage for worsening tremor  [] EMG as outpatient   [] will d/w attending  [] continue rest of management per primary team 63 year old female with PMHX of HTN, DM2, COPD/CHRIS on nocturnal CPAP, RA, discharged on 3/20 from [facility] after a prolonged hospitalization for SBO requiring lysis of adhesions, complicated by hypercapneic respiratory failure requiring intubation. on 3/21 (day of admission) pt awoke with shortness of breath and chest tightness, was supposed to use CPAP the night prior but but nursing facility did not have the machine. She is also being followed by rheum for RA, she has been on prednisone 10 mg for years and naproxen 500 BID, was noted to have RLE weakness, per patient is chronic but has worsened. Of note, patient was evaluated by neurology at [facility] for tremor in bilateral UE and RLE, unable to r/o parkinsons at the time, exam limited to hand atrophy and habitus, was suggested to start primidone 25 daily and Follow up with outpatient neuro. Patient states she's had the tremors for a long time and her siblings have them also, no formal diagnosis or etiology of these tremors.   Neurologic exam is notable for strength deficits: 4+/5 in UE, HF, HE ~2-3/6 bilaterally, 4/5 in RLE dorsi and plantarflexion, 2/5 in RLE dorsiflexion and plantarflexion, sensation deficits: intact to light touch and pinprick except for LLE dorsum decreased sensation to pinprick and touch (10% compared to 100% on other extremities, does not fit any specific dermatome), DTR are symmetrically diminished. Lower extremity venous duplex (bilateral) is negative, however patient has pain in the right aspect of her RLE.   Impression: R foot weakness/drop is likely multifactorial, likely due to combination of arthritis, osteoporosis from chronic steroid use, and lack of mobility. tremors: cannot r/o parkinsonian tremors.    Plan   [] uptitrate primidone to 50 BID  [] EMG as outpatient   [] PT/OT  [] will d/w attending  [] continue rest of management per primary team 63 year old female with PMHX of HTN, DM2, COPD/CHRIS on nocturnal CPAP, RA, discharged on 3/20 from [facility] after a prolonged hospitalization for SBO requiring lysis of adhesions, complicated by hypercapneic respiratory failure requiring intubation. on 3/21 (day of admission) pt awoke with shortness of breath and chest tightness, was supposed to use CPAP the night prior but but nursing facility did not have the machine. She is also being followed by rheum for RA, she has been on prednisone 10 mg for years and naproxen 500 BID, was noted to have RLE weakness, per patient is chronic but has worsened. Of note, patient was evaluated by neurology at [facility] for tremor in bilateral UE and RLE, unable to r/o parkinsons at the time, exam limited to hand atrophy and habitus, was suggested to start primidone 25 daily and Follow up with outpatient neuro. Patient states she's had the tremors for a long time and her siblings have them also, no formal diagnosis or etiology of these tremors.   Neurologic exam is notable for strength deficits: 4+/5 in UE, HF, HE ~2-3/6 bilaterally, 4/5 in RLE dorsi and plantarflexion, 2/5 in RLE dorsiflexion and plantarflexion, sensation deficits: intact to light touch and pinprick except for LLE dorsum decreased sensation to pinprick and touch (10% compared to 100% on other extremities, does not fit any specific dermatome), DTR are symmetrically diminished. Lower extremity venous duplex (bilateral) is negative, however patient has pain in the right aspect of her RLE.   Impression: R foot weakness/drop is likely multifactorial, likely due to combination of arthritis, osteoporosis from chronic steroid use, and lack of mobility. tremors: cannot r/o parkinsonian tremors.    Plan   [] uptitrate primidone to 50 BID  [] EMG inpatient  [] recommend MRI L spine w, w/o contrast  [] PT/OT  [] continue rest of management per primary team 63 year old female with PMHX of HTN, DM2, COPD/CHRIS on nocturnal CPAP, RA, discharged on 3/20 from [facility] after a prolonged hospitalization for SBO requiring lysis of adhesions, complicated by hypercapneic respiratory failure requiring intubation. on 3/21 (day of admission) pt awoke with shortness of breath and chest tightness, was supposed to use CPAP the night prior but but nursing facility did not have the machine. She is also being followed by rheum for RA, she has been on prednisone 10 mg for years and naproxen 500 BID, was noted to have RLE weakness, per patient is chronic but has worsened. Of note, patient was evaluated by neurology at [facility] for tremor in bilateral UE and RLE, unable to r/o parkinsons at the time, exam limited to hand atrophy and habitus, was suggested to start primidone 25 daily and Follow up with outpatient neuro. Patient states she's had the tremors for a long time and her siblings have them also, no formal diagnosis or etiology of these tremors.   Neurologic exam is notable for strength deficits: 4+/5 in UE, HF, HE ~2-3/6 bilaterally, 4/5 in RLE dorsi and plantarflexion, 2/5 in RLE dorsiflexion and plantarflexion, sensation deficits: intact to light touch and pinprick except for LLE dorsum decreased sensation to pinprick and touch (10% compared to 100% on other extremities, does not fit any specific dermatome), DTR are symmetrically diminished. Lower extremity venous duplex (bilateral) is negative, however patient has pain in the right aspect of her RLE.   Impression: R foot weakness/drop is likely multifactorial, likely due to combination of arthritis, osteoporosis from chronic steroid use, and lack of mobility. tremors: consistent with essential tremor with worsening with activity and non pill rolling and without associated rigidity.      Essential Tremor  right foot drop    Plan   [] uptitrate primidone to 50 BID  [] EMG may be pursued as outpatient given 1 month period and stable presentation.  [] recommend MRI L spine w/o contrast.  Can also be pursued outpatient  [] PT/OT  [] continue rest of management per primary team

## 2019-03-29 NOTE — DISCHARGE NOTE PROVIDER - CARE PROVIDERS DIRECT ADDRESSES
[email or web address]

## 2019-03-29 NOTE — CHART NOTE - NSCHARTNOTEFT_GEN_A_CORE
PA Medicine Event Note    Blood glucose at 22:05- 67-->73-->100 s/p juice and snacks.  VSS NAD. Denies dizziness, CP, SOB, palpitations, syncope  Patient refused Lantus 20 U at 22:00 A&Ox3.   After speaking to patient on importance of Lantus dosing and its MOA, patient agreed to Lantus 15 U.  Lantus 15 Units given at 1:50 AM.  Re-check glucose 2 hours after dose given.    Will continue to monitor and endorse to AM team    Malena Broderick PA-C  Dept of Medicine

## 2019-03-29 NOTE — PROGRESS NOTE ADULT - ASSESSMENT
63 year old female with HTN, DM2, COPD/CHRIS on nocturnal CPAP, discharged 3/20 from [facility] after a prolonged hospitalization for SBO requiring lysis of adhesions, complicated by hypercapnic respiratory failure requiring intubation. GI consulted for anemia.

## 2019-03-29 NOTE — PROGRESS NOTE ADULT - SUBJECTIVE AND OBJECTIVE BOX
Patient is a 63y old  Female who presents with a chief complaint of sob (29 Mar 2019 15:21)      INTERVAL HPI/OVERNIGHT EVENTS:  T(C): 36.8 (03-29-19 @ 15:49), Max: 37.2 (03-28-19 @ 19:04)  HR: 96 (03-29-19 @ 17:33) (86 - 102)  BP: 99/61 (03-29-19 @ 15:49) (99/61 - 142/85)  RR: 18 (03-29-19 @ 15:49) (16 - 20)  SpO2: 96% (03-29-19 @ 17:33) (95% - 100%)  Wt(kg): --  I&O's Summary    28 Mar 2019 07:01  -  29 Mar 2019 07:00  --------------------------------------------------------  IN: 420 mL / OUT: 200 mL / NET: 220 mL    29 Mar 2019 07:01  -  29 Mar 2019 18:14  --------------------------------------------------------  IN: 300 mL / OUT: 0 mL / NET: 300 mL        LABS:                        10.3   10.5  )-----------( 479      ( 29 Mar 2019 09:31 )             32.4     03-28    136  |  95<L>  |  19  ----------------------------<  197<H>  4.2   |  24  |  0.71    Ca    9.6      28 Mar 2019 09:39          CAPILLARY BLOOD GLUCOSE      POCT Blood Glucose.: 145 mg/dL (29 Mar 2019 17:05)  POCT Blood Glucose.: 285 mg/dL (29 Mar 2019 12:10)  POCT Blood Glucose.: 297 mg/dL (29 Mar 2019 12:09)  POCT Blood Glucose.: 116 mg/dL (29 Mar 2019 08:26)  POCT Blood Glucose.: 96 mg/dL (29 Mar 2019 04:13)  POCT Blood Glucose.: 100 mg/dL (29 Mar 2019 00:54)  POCT Blood Glucose.: 73 mg/dL (28 Mar 2019 22:32)  POCT Blood Glucose.: 67 mg/dL (28 Mar 2019 22:05)  POCT Blood Glucose.: 80 mg/dL (28 Mar 2019 21:02)            MEDICATIONS  (STANDING):  ALBUTerol/ipratropium for Nebulization 3 milliLiter(s) Nebulizer every 6 hours  buDESOnide  80 MICROgram(s)/formoterol 4.5 MICROgram(s) Inhaler 2 Puff(s) Inhalation two times a day  dextrose 5%. 1000 milliLiter(s) (50 mL/Hr) IV Continuous <Continuous>  dextrose 50% Injectable 12.5 Gram(s) IV Push once  dextrose 50% Injectable 25 Gram(s) IV Push once  dextrose 50% Injectable 25 Gram(s) IV Push once  docusate sodium 100 milliGRAM(s) Oral three times a day  enoxaparin Injectable 40 milliGRAM(s) SubCutaneous every 24 hours  furosemide    Tablet 40 milliGRAM(s) Oral two times a day  gabapentin 300 milliGRAM(s) Oral three times a day  guaiFENesin   Syrup  (Sugar-Free) 200 milliGRAM(s) Oral every 6 hours  influenza   Vaccine 0.5 milliLiter(s) IntraMuscular once  insulin glargine Injectable (LANTUS) 20 Unit(s) SubCutaneous at bedtime  insulin lispro (HumaLOG) corrective regimen sliding scale   SubCutaneous three times a day before meals  insulin lispro Injectable (HumaLOG) 10 Unit(s) SubCutaneous three times a day before meals  levothyroxine 137 MICROGram(s) Oral daily  pantoprazole    Tablet 40 milliGRAM(s) Oral two times a day  polyethylene glycol 3350 17 Gram(s) Oral two times a day  predniSONE   Tablet 20 milliGRAM(s) Oral daily  primidone 50 milliGRAM(s) Oral daily  senna 2 Tablet(s) Oral at bedtime  sildenafil (REVATIO) 20 milliGRAM(s) Oral three times a day  simvastatin 20 milliGRAM(s) Oral at bedtime  sucralfate 1 Gram(s) Oral four times a day  traMADol 50 milliGRAM(s) Oral two times a day    MEDICATIONS  (PRN):  dextrose 40% Gel 15 Gram(s) Oral once PRN Blood Glucose LESS THAN 70 milliGRAM(s)/deciliter  glucagon  Injectable 1 milliGRAM(s) IntraMuscular once PRN Glucose LESS THAN 70 milligrams/deciliter  ondansetron Injectable 4 milliGRAM(s) IV Push every 6 hours PRN Nausea  oxyCODONE    IR 10 milliGRAM(s) Oral every 4 hours PRN Moderate Pain (4 - 6)  sodium chloride 0.65% Nasal 1 Spray(s) Both Nostrils every 4 hours PRN Nasal Congestion          PHYSICAL EXAM:  GENERAL: frail  CHEST/LUNG: Coarse breath sounds bl   HEART: s1s2  ABDOMEN: Soft, Nontender, Nondistended; Bowel sounds present  EXTREMITIES: no edema    Care Discussed with Consultants/Other Providers [* ] YES  [ ] NO

## 2019-03-29 NOTE — CONSULT NOTE ADULT - ATTENDING COMMENTS
63 year old female with PMHX of HTN, DM2, COPD/CHRIS on nocturnal CPAP, RA, discharged on 3/20 from [facility] after a prolonged hospitalization for SBO requiring lysis of adhesions, complicated by hypercapneic respiratory failure requiring intubation. on 3/21 (day of admission) pt awoke with shortness of breath and chest tightness, was supposed to use CPAP the night prior but but nursing facility did not have the machine. She is also being followed by rheum for RA, she has been on prednisone 10 mg for years and naproxen 500 BID, was noted to have RLE weakness, per patient is chronic but has worsened.   Pt has been evaluated at [facility] for tremor in bilateral UE and RLE and was suggested to start primidone 25 daily for her tremors (that family members also share) that appear to be essential though no formal diagnosis had been given.    Neurologic exam is notable for strength deficits: 4+/5 in UE, HF, HE ~2-3/6 bilaterally, 4/5 in RLE dorsi and plantarflexion, 2/5 in RLE dorsiflexion and plantarflexion, sensation deficits: intact to light touch and pinprick except for LLE dorsum decreased sensation to pinprick and touch (10% compared to 100% on other extremities not in specific dermatome), DTRs symmetrically diminished. Pain in right aspect of RLE and with Lower extremity venous duplex (bilateral)   negative.    Assessment: R foot weakness/drop  likely multifactorial.  Would consider lumbar radiculopathy vs mononeuropathy.  Complicated due to combination of arthritis, osteoporosis from chronic steroid use, and lack of mobility.   Essential tremor with worsening with activity.    Plan:  [] Increase primidone to 50 BID  [] EMG may be pursued as outpatient given 1 month period and stable presentation.  [] recommend MRI L spine w/o contrast.  Can also be pursued outpatient  [] PT/OT  [] continue rest of management per primary team

## 2019-03-29 NOTE — PROGRESS NOTE ADULT - ATTENDING COMMENTS
Jeovany Daly MD  Pager [phone]  After 5pm/weekends call [phone]    Please call with questions.

## 2019-03-29 NOTE — CHART NOTE - NSCHARTNOTEFT_GEN_A_CORE
Discussed with Dr. Novoa, pt medical stable for discharge. Pt does not feel that she is medically stable for discharge. She is reporting that the neurology and pulm team wants to do additional testing, and does not want to leave without it being done.  As d/w Dr. Novoa, Neuro and Pulm follow up can be done as outpt. Will give 24 hr discharge notice, but refusing to sign until she speaks to Dr. Novoa. D/w Dr. Novoa who is aware.   Floridalma BUENO   [id number]

## 2019-03-29 NOTE — DISCHARGE NOTE PROVIDER - PROVIDER TOKENS
PROVIDER:[TOKEN:[[id number]]],PROVIDER:[TOKEN:[[id number]]],PROVIDER:[TOKEN:[[id number]]],PROVIDER:[TOKEN:[[id number]]]

## 2019-03-29 NOTE — PROGRESS NOTE ADULT - SUBJECTIVE AND OBJECTIVE BOX
Interval events:  pt seen and examined. She admits to epigastric pain today which is a "burning" sensation  She denies n/v. Tolerating PO well  + brown bm this morning.   await am cbc     MEDICATIONS  (STANDING):  ALBUTerol/ipratropium for Nebulization 3 milliLiter(s) Nebulizer every 6 hours  buDESOnide  80 MICROgram(s)/formoterol 4.5 MICROgram(s) Inhaler 2 Puff(s) Inhalation two times a day  dextrose 5%. 1000 milliLiter(s) (50 mL/Hr) IV Continuous <Continuous>  dextrose 50% Injectable 12.5 Gram(s) IV Push once  dextrose 50% Injectable 25 Gram(s) IV Push once  dextrose 50% Injectable 25 Gram(s) IV Push once  docusate sodium 100 milliGRAM(s) Oral three times a day  enoxaparin Injectable 40 milliGRAM(s) SubCutaneous every 24 hours  furosemide    Tablet 40 milliGRAM(s) Oral two times a day  gabapentin 300 milliGRAM(s) Oral three times a day  guaiFENesin   Syrup  (Sugar-Free) 200 milliGRAM(s) Oral every 6 hours  influenza   Vaccine 0.5 milliLiter(s) IntraMuscular once  insulin glargine Injectable (LANTUS) 20 Unit(s) SubCutaneous at bedtime  insulin lispro (HumaLOG) corrective regimen sliding scale   SubCutaneous three times a day before meals  insulin lispro Injectable (HumaLOG) 10 Unit(s) SubCutaneous three times a day before meals  levothyroxine 137 MICROGram(s) Oral daily  pantoprazole    Tablet 40 milliGRAM(s) Oral before breakfast  polyethylene glycol 3350 17 Gram(s) Oral two times a day  predniSONE   Tablet 20 milliGRAM(s) Oral daily  primidone 25 milliGRAM(s) Oral daily  senna 2 Tablet(s) Oral at bedtime  sildenafil (REVATIO) 20 milliGRAM(s) Oral three times a day  simvastatin 20 milliGRAM(s) Oral at bedtime  traMADol 50 milliGRAM(s) Oral two times a day    MEDICATIONS  (PRN):  dextrose 40% Gel 15 Gram(s) Oral once PRN Blood Glucose LESS THAN 70 milliGRAM(s)/deciliter  glucagon  Injectable 1 milliGRAM(s) IntraMuscular once PRN Glucose LESS THAN 70 milligrams/deciliter  ondansetron Injectable 4 milliGRAM(s) IV Push every 6 hours PRN Nausea  oxyCODONE    IR 10 milliGRAM(s) Oral every 4 hours PRN Moderate Pain (4 - 6)  sodium chloride 0.65% Nasal 1 Spray(s) Both Nostrils every 4 hours PRN Nasal Congestion      Allergies    No Known Allergies    Intolerances    Seroquel (Other)      Review of Systems:    General:  No wt loss, fevers, chills, night sweats,fatigue,   Eyes:  Good vision, no reported pain  ENT:  No sore throat, pain, runny nose, dysphagia  CV:  No pain, palpitations, hypo/hypertension  Resp:  No dyspnea, cough, tachypnea, wheezing  GI:  No pain, No nausea, No vomiting, No diarrhea, No constipation, No weight loss, No fever, No pruritis, No rectal bleeding, No melena, No dysphagia  :  No pain, bleeding, incontinence, nocturia  Muscle:  No pain, weakness  Neuro:  No weakness, tingling, memory problems  Psych:  No fatigue, insomnia, mood problems, depression  Endocrine:  No polyuria, polydypsia, cold/heat intolerance  Heme:  No petechiae, ecchymosis, easy bruisability  Skin:  No rash, tattoos, scars, edema      Vital Signs Last 24 Hrs  T(C): 37 (29 Mar 2019 00:25), Max: 37.2 (28 Mar 2019 19:04)  T(F): 98.6 (29 Mar 2019 00:25), Max: 99 (28 Mar 2019 19:04)  HR: 95 (29 Mar 2019 05:33) (94 - 102)  BP: 142/85 (29 Mar 2019 00:25) (116/81 - 142/85)  BP(mean): --  RR: 16 (29 Mar 2019 00:25) (16 - 20)  SpO2: 95% (29 Mar 2019 05:33) (94% - 99%)    PHYSICAL EXAM:    GENERAL:  Appears stated age, well-groomed, well-nourished, no distress  HEENT:  NC/AT,  conjunctivae clear and pink, no thyromegaly, nodules, adenopathy, no JVD, sclera -anicteric  CHEST:  Full & symmetric excursion, no increased effort, breath sounds clear  HEART:  Regular rhythm, S1, S2, no murmur/rub/S3/S4, no abdominal bruit, no edema  ABDOMEN:  Soft, non-tender, non-distended,+ abdominal incision dressed-- c/d/i   EXTEREMITIES:  no cyanosis,clubbing or edema  SKIN:  No rash/erythema/ecchymoses/petechiae/wounds/abscess/warm/dry  NEURO:  Alert, oriented, no asterixis, no tremor, no encephalopathy      LABS:                        9.3    9.16  )-----------( 345      ( 27 Mar 2019 09:56 )             31.1     03-28    136  |  95<L>  |  19  ----------------------------<  197<H>  4.2   |  24  |  0.71    Ca    9.6      28 Mar 2019 09:39            RADIOLOGY & ADDITIONAL TESTS:

## 2019-03-29 NOTE — PROGRESS NOTE ADULT - ASSESSMENT
Problem/Plan - 1:  ·  Problem: HAP (hospital-acquired pneumonia).  Plan: cw vanco and angela  id and pulmonary fu  will monitor.   spoke with pulmonary , CT sinus not needed a inpt, can be done as outpt     Problem/Plan - 2:  ·  Problem: Chronic hypoxic respiratory failure , Oxygen dependent.  Plan: bipap at night   pulmonary fu.   no need for CT sinus as inpt     Problem/Plan - 3:  ·  Problem: Diabetes.  Plan: monitor FS  ISS.     Problem/Plan - 4:  ·  Problem: foot drop.  Plan: rheum fu appreciated  neuro called ,  whit as per neuro  seen by neuro at [facility] recently and was recommend MRI as outpt   awaiting attending to  stefan on residents note    dc planning

## 2019-03-29 NOTE — DISCHARGE NOTE PROVIDER - NSDCCPCAREPLAN_GEN_ALL_CORE_FT
PRINCIPAL DISCHARGE DIAGNOSIS  Diagnosis: HAP (hospital-acquired pneumonia)  Assessment and Plan of Treatment:       SECONDARY DISCHARGE DIAGNOSES  Diagnosis: Adult Hypothyroidism  Assessment and Plan of Treatment: Adult Hypothyroidism    Diagnosis: SBO (small bowel obstruction)  Assessment and Plan of Treatment: SBO (small bowel obstruction)    Diagnosis: Anemia  Assessment and Plan of Treatment: Anemia    Diagnosis: Respiratory failure with hypercapnia, unspecified chronicity  Assessment and Plan of Treatment: Respiratory failure with hypercapnia, unspecified chronicity    Diagnosis: Chronic obstructive pulmonary disease (COPD)  Assessment and Plan of Treatment: Chronic obstructive pulmonary disease (COPD) PRINCIPAL DISCHARGE DIAGNOSIS  Diagnosis: HAP (hospital-acquired pneumonia)  Assessment and Plan of Treatment: treated   follow-up with your primary care provder---call for appointment      SECONDARY DISCHARGE DIAGNOSES  Diagnosis: Arthritis, rheumatoid  Assessment and Plan of Treatment: - Continue gabapentin  - Continue prednisone 10mg daily  - Given naproxen 375mg BID enteric coated  - PPI while on NSAID  - Would have patient f/u with her outpt rheumatologist in 1-2 weeks      Diagnosis: Wound  Assessment and Plan of Treatment: Abdominal Superficial wound- ADAPTIC  Abdominal Binder to be worn at all times  milena discharge [facility] [address] [place] [address] [phone]      Diagnosis: Adult Hypothyroidism  Assessment and Plan of Treatment: Adult Hypothyroidism---continue home medication    Diagnosis: Diabetes  Assessment and Plan of Treatment: HgA1C this admission.  Make sure you get your HgA1c checked every three months.  If you take oral diabetes medications, check your blood glucose two times a day.  If you take insulin, check your blood glucose before meals and at bedtime.  It's important not to skip any meals.  Keep a log of your blood glucose results and always take it with you to your doctor appointments.  Keep a list of your current medications including injectables and over the counter medications and bring this medication list with you to all your doctor appointments.  If you have not seen your ophthalmologist this year call for appointment.  Check your feet daily for redness, sores, or openings. Do not self treat. If no improvement in two days call your primary care physician for an appointment.  Low blood sugar (hypoglycemia) is a blood sugar below 70mg/dl. Check your blood sugar if you feel signs/symptoms of hypoglycemia. If your blood sugar is below 70 take 15 grams of carbohydrates (ex 4 oz of apple juice, 3-4 glucose tablets, or 4-6 oz of regular soda) wait 15 minutes and repeat blood sugar to make sure it comes up above 70.  If your blood sugar is above 70 and you are due for a meal, have a meal.  If you are not due for a meal have a snack.  This snack helps keeps your blood sugar at a safe range    Diagnosis: Respiratory failure with hypercapnia, unspecified chronicity  Assessment and Plan of Treatment: Respiratory failure with hypercapnia, unspecified chronicity--continue bipap at night    Diagnosis: Spinal stenosis, lumbar  Assessment and Plan of Treatment: - Neurology for EMG and NCS  - c/w primidone to 50 BID, PT/OT  - May f/u outpatient with Dr. Colvin in 1-2 weeks after discharge       Diagnosis: Hernia  Assessment and Plan of Treatment: Follow-up with Dr. Parham for elective hernia repair      Diagnosis: Chronic obstructive pulmonary disease (COPD)  Assessment and Plan of Treatment: Oxygen dependent.  Plan: bipap at night       Diagnosis: Anemia  Assessment and Plan of Treatment: Anemia.  Plan: - monitor h/h daily, transfuse prn  - no overt s/s GIB  - recommend heme evaluation  - cw PPI BID, Carafate 1 gram QID for epigastric pain.       Diagnosis: SBO (small bowel obstruction)  Assessment and Plan of Treatment:

## 2019-03-29 NOTE — PROGRESS NOTE ADULT - SUBJECTIVE AND OBJECTIVE BOX
pt seen and examined,  no  chest pain , ROS -     ALBUTerol/ipratropium for Nebulization 3 milliLiter(s) Nebulizer every 6 hours  buDESOnide  80 MICROgram(s)/formoterol 4.5 MICROgram(s) Inhaler 2 Puff(s) Inhalation two times a day  dextrose 40% Gel 15 Gram(s) Oral once PRN  dextrose 5%. 1000 milliLiter(s) IV Continuous <Continuous>  dextrose 50% Injectable 12.5 Gram(s) IV Push once  dextrose 50% Injectable 25 Gram(s) IV Push once  dextrose 50% Injectable 25 Gram(s) IV Push once  docusate sodium 100 milliGRAM(s) Oral three times a day  enoxaparin Injectable 40 milliGRAM(s) SubCutaneous every 24 hours  furosemide    Tablet 40 milliGRAM(s) Oral two times a day  gabapentin 300 milliGRAM(s) Oral three times a day  glucagon  Injectable 1 milliGRAM(s) IntraMuscular once PRN  guaiFENesin   Syrup  (Sugar-Free) 200 milliGRAM(s) Oral every 6 hours  influenza   Vaccine 0.5 milliLiter(s) IntraMuscular once  insulin glargine Injectable (LANTUS) 20 Unit(s) SubCutaneous at bedtime  insulin lispro (HumaLOG) corrective regimen sliding scale   SubCutaneous three times a day before meals  insulin lispro Injectable (HumaLOG) 10 Unit(s) SubCutaneous three times a day before meals  levothyroxine 137 MICROGram(s) Oral daily  ondansetron Injectable 4 milliGRAM(s) IV Push every 6 hours PRN  oxyCODONE    IR 10 milliGRAM(s) Oral every 4 hours PRN  pantoprazole    Tablet 40 milliGRAM(s) Oral before breakfast  polyethylene glycol 3350 17 Gram(s) Oral two times a day  predniSONE   Tablet 20 milliGRAM(s) Oral daily  primidone 25 milliGRAM(s) Oral daily  senna 2 Tablet(s) Oral at bedtime  sildenafil (REVATIO) 20 milliGRAM(s) Oral three times a day  simvastatin 20 milliGRAM(s) Oral at bedtime  sodium chloride 0.65% Nasal 1 Spray(s) Both Nostrils every 4 hours PRN  traMADol 50 milliGRAM(s) Oral two times a day                            9.3    9.16  )-----------( 345      ( 27 Mar 2019 09:56 )             31.1       Hemoglobin: 9.3 g/dL (03-27 @ 09:56)  Hemoglobin: 7.6 g/dL (03-26 @ 08:47)  Hemoglobin: 8.3 g/dL (03-24 @ 09:57)      03-28    136  |  95<L>  |  19  ----------------------------<  197<H>  4.2   |  24  |  0.71    Ca    9.6      28 Mar 2019 09:39      Creatinine Trend: 0.71<--, 0.76<--, 0.86<--, 0.97<--, 1.00<--, 1.02<--    COAGS:           T(C): 37 (03-29-19 @ 00:25), Max: 37.2 (03-28-19 @ 19:04)  HR: 95 (03-29-19 @ 05:33) (94 - 103)  BP: 142/85 (03-29-19 @ 00:25) (116/81 - 142/85)  RR: 16 (03-29-19 @ 00:25) (16 - 20)  SpO2: 95% (03-29-19 @ 05:33) (94% - 99%)  Wt(kg): --    I&O's Summary    27 Mar 2019 07:01  -  28 Mar 2019 07:00  --------------------------------------------------------  IN: 500 mL / OUT: 200 mL / NET: 300 mL    28 Mar 2019 07:01  -  29 Mar 2019 05:46  --------------------------------------------------------  IN: 420 mL / OUT: 200 mL / NET: 220 mL          Gen: Appears well in NAD  HEENT:  (-)icterus (-)pallor  CV: N S1 S2 1/6 OJ (+)2 Pulses B/l  Resp:  Clear to ausculatation B/L, normal effort  GI: (+) BS Soft, NT, ND  Lymph:  (-)Edema, (-)obvious lymphadenopathy  Skin: Warm to touch, Normal turgor  Psych: Appropriate mood and affect      ECG:  	sinus Tachycardia 109 BPM, nonspecific T wave abnormality    RADIOLOGY:         CXR:  < from: Xray Chest 1 View- PORTABLE-Urgent (03.21.19 @ 11:18) >  The heart is enlarged. Improvingopacities in both lungs when compared to   previous study done on March 6, 2019. Those changes are compatible with   interstitial fluid however an infectious process cannot be ruled out   entirely. Severe degenerative changes of the right shoulder. Degenerative   changes of the thoracic spine is evident as well.    < end of copied text >      ECHO: pending < from: Transthoracic Echocardiogram (03.26.19 @ 10:29) >  Conclusions:  1. Increased relative wall thickness with normal left  ventricular mass index, consistent with concentric left  ventricular remodeling.  2. Hyperdynamic left ventricular systolic function.  3. Estimated pulmonary artery systolic pressure equals 42  mm Hg, assuming right atrial pressureequals 8 mm Hg,  consistent with mild pulmonary pressures.    < end of copied text >      ASSESSMENT/PLAN: 	63y Female  HTN, DM2, COPD/CHRIS on nocturnal CPAP, normal LV function un echo 1/19 moderate pulmonary HTN discharged yesterday from [facility] after a prolonged hospitalization for SBO requiring lysis of adhesions, complicated by hypercapneic respiratory failure requiring intubation now being treated for HCAP..    - 3/26 ECHO ,   hyperdynamic LV fx   - no further CV work up needed at present  - cont diuretics , keep net neg   - Nutritional support   - Cpap per Pulm, steroids taper ,  -  cont All meds for pulm htn    -  GI / DVT prophylaxis,  keep K>4, mag >2.0  D/W Dr Joseph

## 2019-03-30 LAB
GLUCOSE BLDC GLUCOMTR-MCNC: 112 MG/DL — HIGH (ref 70–99)
GLUCOSE BLDC GLUCOMTR-MCNC: 118 MG/DL — HIGH (ref 70–99)
GLUCOSE BLDC GLUCOMTR-MCNC: 137 MG/DL — HIGH (ref 70–99)
GLUCOSE BLDC GLUCOMTR-MCNC: 145 MG/DL — HIGH (ref 70–99)
GLUCOSE BLDC GLUCOMTR-MCNC: 83 MG/DL — SIGNIFICANT CHANGE UP (ref 70–99)
GLUCOSE BLDC GLUCOMTR-MCNC: 94 MG/DL — SIGNIFICANT CHANGE UP (ref 70–99)

## 2019-03-30 PROCEDURE — 73521 X-RAY EXAM HIPS BI 2 VIEWS: CPT | Mod: 26

## 2019-03-30 PROCEDURE — 73562 X-RAY EXAM OF KNEE 3: CPT | Mod: 26,50

## 2019-03-30 PROCEDURE — 73590 X-RAY EXAM OF LOWER LEG: CPT | Mod: 26,RT

## 2019-03-30 PROCEDURE — 70450 CT HEAD/BRAIN W/O DYE: CPT | Mod: 26

## 2019-03-30 PROCEDURE — 73552 X-RAY EXAM OF FEMUR 2/>: CPT | Mod: 26,RT

## 2019-03-30 RX ORDER — SENNA PLUS 8.6 MG/1
2 TABLET ORAL
Qty: 0 | Refills: 0 | DISCHARGE
Start: 2019-03-30

## 2019-03-30 RX ORDER — DOCUSATE SODIUM 100 MG
1 CAPSULE ORAL
Qty: 0 | Refills: 0 | DISCHARGE
Start: 2019-03-30

## 2019-03-30 RX ADMIN — GABAPENTIN 300 MILLIGRAM(S): 400 CAPSULE ORAL at 06:52

## 2019-03-30 RX ADMIN — Medication 100 MILLIGRAM(S): at 06:59

## 2019-03-30 RX ADMIN — Medication 1 GRAM(S): at 23:58

## 2019-03-30 RX ADMIN — TRAMADOL HYDROCHLORIDE 50 MILLIGRAM(S): 50 TABLET ORAL at 07:59

## 2019-03-30 RX ADMIN — Medication 3 MILLILITER(S): at 06:52

## 2019-03-30 RX ADMIN — OXYCODONE HYDROCHLORIDE 10 MILLIGRAM(S): 5 TABLET ORAL at 00:27

## 2019-03-30 RX ADMIN — OXYCODONE HYDROCHLORIDE 10 MILLIGRAM(S): 5 TABLET ORAL at 12:31

## 2019-03-30 RX ADMIN — OXYCODONE HYDROCHLORIDE 10 MILLIGRAM(S): 5 TABLET ORAL at 16:13

## 2019-03-30 RX ADMIN — Medication 40 MILLIGRAM(S): at 06:59

## 2019-03-30 RX ADMIN — Medication 20 MILLIGRAM(S): at 06:52

## 2019-03-30 RX ADMIN — Medication 10 UNIT(S): at 08:54

## 2019-03-30 RX ADMIN — ENOXAPARIN SODIUM 40 MILLIGRAM(S): 100 INJECTION SUBCUTANEOUS at 13:21

## 2019-03-30 RX ADMIN — Medication 200 MILLIGRAM(S): at 23:58

## 2019-03-30 RX ADMIN — OXYCODONE HYDROCHLORIDE 10 MILLIGRAM(S): 5 TABLET ORAL at 07:56

## 2019-03-30 RX ADMIN — Medication 3 MILLILITER(S): at 23:58

## 2019-03-30 RX ADMIN — FENTANYL CITRATE 1 PATCH: 50 INJECTION INTRAVENOUS at 22:07

## 2019-03-30 RX ADMIN — Medication 3 MILLILITER(S): at 18:55

## 2019-03-30 RX ADMIN — FENTANYL CITRATE 1 PATCH: 50 INJECTION INTRAVENOUS at 19:12

## 2019-03-30 RX ADMIN — Medication 40 MILLIGRAM(S): at 18:12

## 2019-03-30 RX ADMIN — GABAPENTIN 300 MILLIGRAM(S): 400 CAPSULE ORAL at 22:06

## 2019-03-30 RX ADMIN — FENTANYL CITRATE 1 PATCH: 50 INJECTION INTRAVENOUS at 08:00

## 2019-03-30 RX ADMIN — Medication 20 MILLIGRAM(S): at 06:58

## 2019-03-30 RX ADMIN — BUDESONIDE AND FORMOTEROL FUMARATE DIHYDRATE 2 PUFF(S): 160; 4.5 AEROSOL RESPIRATORY (INHALATION) at 18:14

## 2019-03-30 RX ADMIN — Medication 200 MILLIGRAM(S): at 12:01

## 2019-03-30 RX ADMIN — FENTANYL CITRATE 1 PATCH: 50 INJECTION INTRAVENOUS at 22:12

## 2019-03-30 RX ADMIN — OXYCODONE HYDROCHLORIDE 10 MILLIGRAM(S): 5 TABLET ORAL at 20:16

## 2019-03-30 RX ADMIN — Medication 1 GRAM(S): at 12:01

## 2019-03-30 RX ADMIN — GABAPENTIN 300 MILLIGRAM(S): 400 CAPSULE ORAL at 13:21

## 2019-03-30 RX ADMIN — OXYCODONE HYDROCHLORIDE 10 MILLIGRAM(S): 5 TABLET ORAL at 12:01

## 2019-03-30 RX ADMIN — Medication 10 UNIT(S): at 18:23

## 2019-03-30 RX ADMIN — TRAMADOL HYDROCHLORIDE 50 MILLIGRAM(S): 50 TABLET ORAL at 18:12

## 2019-03-30 RX ADMIN — Medication 1 SPRAY(S): at 08:54

## 2019-03-30 RX ADMIN — OXYCODONE HYDROCHLORIDE 10 MILLIGRAM(S): 5 TABLET ORAL at 21:00

## 2019-03-30 RX ADMIN — Medication 100 MILLIGRAM(S): at 13:21

## 2019-03-30 RX ADMIN — Medication 1 GRAM(S): at 18:12

## 2019-03-30 RX ADMIN — Medication 137 MICROGRAM(S): at 06:53

## 2019-03-30 RX ADMIN — SENNA PLUS 2 TABLET(S): 8.6 TABLET ORAL at 22:06

## 2019-03-30 RX ADMIN — PANTOPRAZOLE SODIUM 40 MILLIGRAM(S): 20 TABLET, DELAYED RELEASE ORAL at 18:12

## 2019-03-30 RX ADMIN — OXYCODONE HYDROCHLORIDE 10 MILLIGRAM(S): 5 TABLET ORAL at 16:55

## 2019-03-30 RX ADMIN — SIMVASTATIN 20 MILLIGRAM(S): 20 TABLET, FILM COATED ORAL at 22:07

## 2019-03-30 RX ADMIN — OXYCODONE HYDROCHLORIDE 10 MILLIGRAM(S): 5 TABLET ORAL at 02:44

## 2019-03-30 RX ADMIN — INSULIN GLARGINE 20 UNIT(S): 100 INJECTION, SOLUTION SUBCUTANEOUS at 22:06

## 2019-03-30 RX ADMIN — Medication 10 UNIT(S): at 12:37

## 2019-03-30 RX ADMIN — Medication 20 MILLIGRAM(S): at 13:21

## 2019-03-30 RX ADMIN — PANTOPRAZOLE SODIUM 40 MILLIGRAM(S): 20 TABLET, DELAYED RELEASE ORAL at 06:58

## 2019-03-30 RX ADMIN — Medication 100 MILLIGRAM(S): at 22:06

## 2019-03-30 RX ADMIN — Medication 1 GRAM(S): at 06:59

## 2019-03-30 RX ADMIN — Medication 20 MILLIGRAM(S): at 22:06

## 2019-03-30 RX ADMIN — OXYCODONE HYDROCHLORIDE 10 MILLIGRAM(S): 5 TABLET ORAL at 08:26

## 2019-03-30 RX ADMIN — Medication 1 SPRAY(S): at 22:08

## 2019-03-30 RX ADMIN — Medication 200 MILLIGRAM(S): at 18:12

## 2019-03-30 RX ADMIN — TRAMADOL HYDROCHLORIDE 50 MILLIGRAM(S): 50 TABLET ORAL at 18:51

## 2019-03-30 RX ADMIN — Medication 3 MILLILITER(S): at 12:01

## 2019-03-30 RX ADMIN — Medication 200 MILLIGRAM(S): at 06:58

## 2019-03-30 RX ADMIN — TRAMADOL HYDROCHLORIDE 50 MILLIGRAM(S): 50 TABLET ORAL at 06:58

## 2019-03-30 RX ADMIN — OXYCODONE HYDROCHLORIDE 10 MILLIGRAM(S): 5 TABLET ORAL at 02:14

## 2019-03-30 RX ADMIN — PRIMIDONE 50 MILLIGRAM(S): 250 TABLET ORAL at 12:02

## 2019-03-30 NOTE — PROGRESS NOTE ADULT - SUBJECTIVE AND OBJECTIVE BOX
Patient is a 63y old  Female who presents with a chief complaint of sob (30 Mar 2019 10:30)      INTERVAL HPI/OVERNIGHT EVENTS:  T(C): 36.8 (03-30-19 @ 08:00), Max: 37.1 (03-30-19 @ 01:20)  HR: 90 (03-30-19 @ 09:53) (85 - 100)  BP: 99/63 (03-30-19 @ 08:00) (99/61 - 137/87)  RR: 17 (03-30-19 @ 08:00) (16 - 18)  SpO2: 95% (03-30-19 @ 09:53) (95% - 99%)  Wt(kg): --  I&O's Summary    29 Mar 2019 07:01  -  30 Mar 2019 07:00  --------------------------------------------------------  IN: 300 mL / OUT: 200 mL / NET: 100 mL    30 Mar 2019 07:01  -  30 Mar 2019 15:45  --------------------------------------------------------  IN: 0 mL / OUT: 200 mL / NET: -200 mL        LABS:                        10.3   10.5  )-----------( 479      ( 29 Mar 2019 09:31 )             32.4               CAPILLARY BLOOD GLUCOSE      POCT Blood Glucose.: 145 mg/dL (30 Mar 2019 12:18)  POCT Blood Glucose.: 118 mg/dL (30 Mar 2019 08:30)  POCT Blood Glucose.: 85 mg/dL (29 Mar 2019 22:10)  POCT Blood Glucose.: 145 mg/dL (29 Mar 2019 17:05)            MEDICATIONS  (STANDING):  ALBUTerol/ipratropium for Nebulization 3 milliLiter(s) Nebulizer every 6 hours  buDESOnide  80 MICROgram(s)/formoterol 4.5 MICROgram(s) Inhaler 2 Puff(s) Inhalation two times a day  dextrose 5%. 1000 milliLiter(s) (50 mL/Hr) IV Continuous <Continuous>  dextrose 50% Injectable 12.5 Gram(s) IV Push once  dextrose 50% Injectable 25 Gram(s) IV Push once  dextrose 50% Injectable 25 Gram(s) IV Push once  docusate sodium 100 milliGRAM(s) Oral three times a day  enoxaparin Injectable 40 milliGRAM(s) SubCutaneous every 24 hours  fentaNYL   Patch  25 MICROgram(s)/Hr 1 Patch Transdermal every 72 hours  furosemide    Tablet 40 milliGRAM(s) Oral two times a day  gabapentin 300 milliGRAM(s) Oral three times a day  guaiFENesin   Syrup  (Sugar-Free) 200 milliGRAM(s) Oral every 6 hours  influenza   Vaccine 0.5 milliLiter(s) IntraMuscular once  insulin glargine Injectable (LANTUS) 20 Unit(s) SubCutaneous at bedtime  insulin lispro (HumaLOG) corrective regimen sliding scale   SubCutaneous three times a day before meals  insulin lispro Injectable (HumaLOG) 10 Unit(s) SubCutaneous three times a day before meals  levothyroxine 137 MICROGram(s) Oral daily  pantoprazole    Tablet 40 milliGRAM(s) Oral two times a day  polyethylene glycol 3350 17 Gram(s) Oral two times a day  predniSONE   Tablet 20 milliGRAM(s) Oral daily  primidone 50 milliGRAM(s) Oral daily  senna 2 Tablet(s) Oral at bedtime  sildenafil (REVATIO) 20 milliGRAM(s) Oral three times a day  simvastatin 20 milliGRAM(s) Oral at bedtime  sucralfate 1 Gram(s) Oral four times a day  traMADol 50 milliGRAM(s) Oral two times a day    MEDICATIONS  (PRN):  dextrose 40% Gel 15 Gram(s) Oral once PRN Blood Glucose LESS THAN 70 milliGRAM(s)/deciliter  glucagon  Injectable 1 milliGRAM(s) IntraMuscular once PRN Glucose LESS THAN 70 milligrams/deciliter  ondansetron Injectable 4 milliGRAM(s) IV Push every 6 hours PRN Nausea  oxyCODONE    IR 10 milliGRAM(s) Oral every 4 hours PRN Moderate Pain (4 - 6)  sodium chloride 0.65% Nasal 1 Spray(s) Both Nostrils every 4 hours PRN Nasal Congestion          PHYSICAL EXAM:  GENERAL: NAD, well-groomed, well-developed  HEAD:  Atraumatic, Normocephalic  CHEST/LUNG: Clear to percussion bilaterally; No rales, rhonchi, wheezing, or rubs  HEART: Regular rate and rhythm; No murmurs, rubs, or gallops  ABDOMEN: Soft, Nontender, Nondistended; Bowel sounds present  EXTREMITIES:  2+ Peripheral Pulses, No clubbing, cyanosis, or edema  LYMPH: No lymphadenopathy noted  SKIN: No rashes or lesions    Care Discussed with Consultants/Other Providers [ ] YES  [ ] NO

## 2019-03-30 NOTE — PROGRESS NOTE ADULT - SUBJECTIVE AND OBJECTIVE BOX
pt seen and examined,  no  chest pain , ROS -     ALBUTerol/ipratropium for Nebulization 3 milliLiter(s) Nebulizer every 6 hours  buDESOnide  80 MICROgram(s)/formoterol 4.5 MICROgram(s) Inhaler 2 Puff(s) Inhalation two times a day  dextrose 40% Gel 15 Gram(s) Oral once PRN  dextrose 5%. 1000 milliLiter(s) IV Continuous <Continuous>  dextrose 50% Injectable 12.5 Gram(s) IV Push once  dextrose 50% Injectable 25 Gram(s) IV Push once  dextrose 50% Injectable 25 Gram(s) IV Push once  docusate sodium 100 milliGRAM(s) Oral three times a day  enoxaparin Injectable 40 milliGRAM(s) SubCutaneous every 24 hours  fentaNYL   Patch  25 MICROgram(s)/Hr 1 Patch Transdermal every 72 hours  furosemide    Tablet 40 milliGRAM(s) Oral two times a day  gabapentin 300 milliGRAM(s) Oral three times a day  glucagon  Injectable 1 milliGRAM(s) IntraMuscular once PRN  guaiFENesin   Syrup  (Sugar-Free) 200 milliGRAM(s) Oral every 6 hours  influenza   Vaccine 0.5 milliLiter(s) IntraMuscular once  insulin glargine Injectable (LANTUS) 20 Unit(s) SubCutaneous at bedtime  insulin lispro (HumaLOG) corrective regimen sliding scale   SubCutaneous three times a day before meals  insulin lispro Injectable (HumaLOG) 10 Unit(s) SubCutaneous three times a day before meals  levothyroxine 137 MICROGram(s) Oral daily  ondansetron Injectable 4 milliGRAM(s) IV Push every 6 hours PRN  oxyCODONE    IR 10 milliGRAM(s) Oral every 4 hours PRN  pantoprazole    Tablet 40 milliGRAM(s) Oral two times a day  polyethylene glycol 3350 17 Gram(s) Oral two times a day  predniSONE   Tablet 20 milliGRAM(s) Oral daily  primidone 50 milliGRAM(s) Oral daily  senna 2 Tablet(s) Oral at bedtime  sildenafil (REVATIO) 20 milliGRAM(s) Oral three times a day  simvastatin 20 milliGRAM(s) Oral at bedtime  sodium chloride 0.65% Nasal 1 Spray(s) Both Nostrils every 4 hours PRN  sucralfate 1 Gram(s) Oral four times a day  traMADol 50 milliGRAM(s) Oral two times a day                            10.3   10.5  )-----------( 479      ( 29 Mar 2019 09:31 )             32.4       Hemoglobin: 10.3 g/dL (03-29 @ 09:31)  Hemoglobin: 9.3 g/dL (03-27 @ 09:56)  Hemoglobin: 7.6 g/dL (03-26 @ 08:47)      03-28    136  |  95<L>  |  19  ----------------------------<  197<H>  4.2   |  24  |  0.71    Ca    9.6      28 Mar 2019 09:39      Creatinine Trend: 0.71<--, 0.76<--, 0.86<--, 0.97<--, 1.00<--, 1.02<--    COAGS:           T(C): 37.1 (03-30-19 @ 01:20), Max: 37.1 (03-30-19 @ 01:20)  HR: 92 (03-30-19 @ 02:35) (85 - 100)  BP: 137/87 (03-30-19 @ 01:20) (99/61 - 137/87)  RR: 16 (03-30-19 @ 01:20) (16 - 18)  SpO2: 96% (03-30-19 @ 02:35) (95% - 99%)  Wt(kg): --    I&O's Summary    29 Mar 2019 07:01  -  30 Mar 2019 07:00  --------------------------------------------------------  IN: 300 mL / OUT: 200 mL / NET: 100 mL      Gen: Appears well in NAD  HEENT:  (-)icterus (-)pallor  CV: N S1 S2 1/6 OJ (+)2 Pulses B/l  Resp:  Clear to ausculatation B/L, normal effort  GI: (+) BS Soft, NT, ND  Lymph:  (-)Edema, (-)obvious lymphadenopathy  Skin: Warm to touch, Normal turgor  Psych: Appropriate mood and affect      ECG:  	sinus Tachycardia 109 BPM, nonspecific T wave abnormality    RADIOLOGY:         CXR:  < from: Xray Chest 1 View- PORTABLE-Urgent (03.21.19 @ 11:18) >  The heart is enlarged. Improvingopacities in both lungs when compared to   previous study done on March 6, 2019. Those changes are compatible with   interstitial fluid however an infectious process cannot be ruled out   entirely. Severe degenerative changes of the right shoulder. Degenerative   changes of the thoracic spine is evident as well.    < end of copied text >      ECHO: pending < from: Transthoracic Echocardiogram (03.26.19 @ 10:29) >  Conclusions:  1. Increased relative wall thickness with normal left  ventricular mass index, consistent with concentric left  ventricular remodeling.  2. Hyperdynamic left ventricular systolic function.  3. Estimated pulmonary artery systolic pressure equals 42  mm Hg, assuming right atrial pressureequals 8 mm Hg,  consistent with mild pulmonary pressures.    < end of copied text >      ASSESSMENT/PLAN: 	63y Female  HTN, DM2, COPD/CHRIS on nocturnal CPAP, normal LV function un echo 1/19 moderate pulmonary HTN discharged yesterday from [facility] after a prolonged hospitalization for SBO requiring lysis of adhesions, complicated by hypercapneic respiratory failure requiring intubation now being treated for HCAP..    - 3/26 ECHO ,   hyperdynamic LV fx   - no further CV work up needed at present  - cont diuretics , keep net neg   - Nutritional support   -  cont All meds for pulm htn    -  GI / DVT prophylaxis,  keep K>4, mag >2.0  D/W Dr Joseph

## 2019-03-30 NOTE — PROGRESS NOTE ADULT - ASSESSMENT
63 year old female with HTN, DM2, COPD/CHRIS on nocturnal CPAP, discharged 3/20 from [facility] after a prolonged hospitalization for SBO requiring lysis of adhesions, complicated by hypercapnic respiratory failure requiring intubation. GI consulted for anemia.

## 2019-03-30 NOTE — PROGRESS NOTE ADULT - SUBJECTIVE AND OBJECTIVE BOX
INTERVAL HPI/OVERNIGHT EVENTS:  No new overnight event.  No N/V/D.  Tolerating diet.   MEDICATIONS  (STANDING):  ALBUTerol/ipratropium for Nebulization 3 milliLiter(s) Nebulizer every 6 hours  buDESOnide  80 MICROgram(s)/formoterol 4.5 MICROgram(s) Inhaler 2 Puff(s) Inhalation two times a day  dextrose 5%. 1000 milliLiter(s) (50 mL/Hr) IV Continuous <Continuous>  dextrose 50% Injectable 12.5 Gram(s) IV Push once  dextrose 50% Injectable 25 Gram(s) IV Push once  dextrose 50% Injectable 25 Gram(s) IV Push once  docusate sodium 100 milliGRAM(s) Oral three times a day  enoxaparin Injectable 40 milliGRAM(s) SubCutaneous every 24 hours  fentaNYL   Patch  25 MICROgram(s)/Hr 1 Patch Transdermal every 72 hours  furosemide    Tablet 40 milliGRAM(s) Oral two times a day  gabapentin 300 milliGRAM(s) Oral three times a day  guaiFENesin   Syrup  (Sugar-Free) 200 milliGRAM(s) Oral every 6 hours  influenza   Vaccine 0.5 milliLiter(s) IntraMuscular once  insulin glargine Injectable (LANTUS) 20 Unit(s) SubCutaneous at bedtime  insulin lispro (HumaLOG) corrective regimen sliding scale   SubCutaneous three times a day before meals  insulin lispro Injectable (HumaLOG) 10 Unit(s) SubCutaneous three times a day before meals  levothyroxine 137 MICROGram(s) Oral daily  pantoprazole    Tablet 40 milliGRAM(s) Oral two times a day  polyethylene glycol 3350 17 Gram(s) Oral two times a day  predniSONE   Tablet 20 milliGRAM(s) Oral daily  primidone 50 milliGRAM(s) Oral daily  senna 2 Tablet(s) Oral at bedtime  sildenafil (REVATIO) 20 milliGRAM(s) Oral three times a day  simvastatin 20 milliGRAM(s) Oral at bedtime  sucralfate 1 Gram(s) Oral four times a day  traMADol 50 milliGRAM(s) Oral two times a day    MEDICATIONS  (PRN):  dextrose 40% Gel 15 Gram(s) Oral once PRN Blood Glucose LESS THAN 70 milliGRAM(s)/deciliter  glucagon  Injectable 1 milliGRAM(s) IntraMuscular once PRN Glucose LESS THAN 70 milligrams/deciliter  ondansetron Injectable 4 milliGRAM(s) IV Push every 6 hours PRN Nausea  oxyCODONE    IR 10 milliGRAM(s) Oral every 4 hours PRN Moderate Pain (4 - 6)  sodium chloride 0.65% Nasal 1 Spray(s) Both Nostrils every 4 hours PRN Nasal Congestion      Allergies    No Known Allergies    Intolerances    Seroquel (Other)      Review of Systems:    General:  No wt loss, fevers, chills, night sweats,fatigue,   Eyes:  Good vision, no reported pain  ENT:  No sore throat, pain, runny nose, dysphagia  CV:  No pain, palpitatioins, hypo/hypertension  Resp:  No dyspnea, cough, tachypnea, wheezing  GI:  No pain, No nausea, No vomiting, No diarrhea, No constipatiion, No weight loss, No fever, No pruritis, No rectal bleeding, No tarry stools, No dysphagia,  :  No pain, bleeding, incontinence, nocturia  Muscle:  No pain, weakness  Neuro:  No weakness, tingling, memory problems  Psych:  No fatigue, insomnia, mood problems, depression  Endocrine:  No polyuria, polydypsia, cold/heat intolerance  Heme:  No petechiae, ecchymosis, easy bruisability  Skin:  No rash, tattoos, scars, edema      Vital Signs Last 24 Hrs  T(C): 36.8 (30 Mar 2019 08:00), Max: 37.1 (30 Mar 2019 01:20)  T(F): 98.2 (30 Mar 2019 08:00), Max: 98.8 (30 Mar 2019 01:20)  HR: 90 (30 Mar 2019 09:53) (85 - 100)  BP: 99/63 (30 Mar 2019 08:00) (99/61 - 137/87)  BP(mean): --  RR: 17 (30 Mar 2019 08:00) (16 - 18)  SpO2: 95% (30 Mar 2019 09:53) (95% - 99%)    PHYSICAL EXAM:    Constitutional: NAD, well-developed  HEENT: EOMI, throat clear  Neck: No LAD, supple  Respiratory: CTA and P  Cardiovascular: S1 and S2, RRR, no M  Gastrointestinal: BS+, soft, NT/ND, neg HSM,  Extremities: No peripheral edema, neg clubing, cyanosis  Vascular: 2+ peripheral pulses  Neurological: A/O x 3, no focal deficits  Psychiatric: Normal mood, normal affect  Skin: No rashes      LABS:                        10.3   10.5  )-----------( 479      ( 29 Mar 2019 09:31 )             32.4                 RADIOLOGY & ADDITIONAL TESTS:

## 2019-03-30 NOTE — PROGRESS NOTE ADULT - ASSESSMENT
63yof w/ HTN, DM2, COPD/CHRIS on nocturnal CPAP, discharged from [facility] after a prolonged hospitalization for SBO requiring lysis of adhesions, complicated by hypercapneic respiratory failure requiring intubation. Pt awoke with shortness of breath and chest tightness, was supposed to use CPAP last night but nursing facility did not have the machine. Pt oxygen dependent on 4L at baseline. Feeling better now with supplemental oxygen. No fevers or chills. Occasional cough.  originally admitted on 12/15 for SBO s/p ex lap with lysis of adhesions (4 retention sutures) c/b 12/30 she eviscerated 2/2 a coughing episode s/p ex lap (6 retention sutures) c/b multifocal pneumonia, developed hypercapnic respiratory failure requiring intubation 1/6, s/p bronch 1/9/18, and extubated on 1/24, course further c/b Enterococci bacteremia now readmitted to MICU for hypoxic respiratory failure 2/2 atelectasis requiring intubation 01/31. Had grown ESBL in urine, s/p ertapenem.  Extubated to high flow/ BIPAP.

## 2019-03-30 NOTE — PROGRESS NOTE ADULT - ATTENDING COMMENTS
pt seems stable from pulm point of view: finished HAP  rx: on bipap at night: She is alert and awake: She would need follow up ct chest in 6 -8 weeks time: DC Planning:

## 2019-03-30 NOTE — PROGRESS NOTE ADULT - SUBJECTIVE AND OBJECTIVE BOX
Patient is a 63y old  Female who presents with a chief complaint of sob (30 Mar 2019 07:58)      Any change in ROS: Breathing wise OK : no cough :      MEDICATIONS  (STANDING):  ALBUTerol/ipratropium for Nebulization 3 milliLiter(s) Nebulizer every 6 hours  buDESOnide  80 MICROgram(s)/formoterol 4.5 MICROgram(s) Inhaler 2 Puff(s) Inhalation two times a day  dextrose 5%. 1000 milliLiter(s) (50 mL/Hr) IV Continuous <Continuous>  dextrose 50% Injectable 12.5 Gram(s) IV Push once  dextrose 50% Injectable 25 Gram(s) IV Push once  dextrose 50% Injectable 25 Gram(s) IV Push once  docusate sodium 100 milliGRAM(s) Oral three times a day  enoxaparin Injectable 40 milliGRAM(s) SubCutaneous every 24 hours  fentaNYL   Patch  25 MICROgram(s)/Hr 1 Patch Transdermal every 72 hours  furosemide    Tablet 40 milliGRAM(s) Oral two times a day  gabapentin 300 milliGRAM(s) Oral three times a day  guaiFENesin   Syrup  (Sugar-Free) 200 milliGRAM(s) Oral every 6 hours  influenza   Vaccine 0.5 milliLiter(s) IntraMuscular once  insulin glargine Injectable (LANTUS) 20 Unit(s) SubCutaneous at bedtime  insulin lispro (HumaLOG) corrective regimen sliding scale   SubCutaneous three times a day before meals  insulin lispro Injectable (HumaLOG) 10 Unit(s) SubCutaneous three times a day before meals  levothyroxine 137 MICROGram(s) Oral daily  pantoprazole    Tablet 40 milliGRAM(s) Oral two times a day  polyethylene glycol 3350 17 Gram(s) Oral two times a day  predniSONE   Tablet 20 milliGRAM(s) Oral daily  primidone 50 milliGRAM(s) Oral daily  senna 2 Tablet(s) Oral at bedtime  sildenafil (REVATIO) 20 milliGRAM(s) Oral three times a day  simvastatin 20 milliGRAM(s) Oral at bedtime  sucralfate 1 Gram(s) Oral four times a day  traMADol 50 milliGRAM(s) Oral two times a day    MEDICATIONS  (PRN):  dextrose 40% Gel 15 Gram(s) Oral once PRN Blood Glucose LESS THAN 70 milliGRAM(s)/deciliter  glucagon  Injectable 1 milliGRAM(s) IntraMuscular once PRN Glucose LESS THAN 70 milligrams/deciliter  ondansetron Injectable 4 milliGRAM(s) IV Push every 6 hours PRN Nausea  oxyCODONE    IR 10 milliGRAM(s) Oral every 4 hours PRN Moderate Pain (4 - 6)  sodium chloride 0.65% Nasal 1 Spray(s) Both Nostrils every 4 hours PRN Nasal Congestion    Vital Signs Last 24 Hrs  T(C): 37.1 (30 Mar 2019 01:20), Max: 37.1 (30 Mar 2019 01:20)  T(F): 98.8 (30 Mar 2019 01:20), Max: 98.8 (30 Mar 2019 01:20)  HR: 90 (30 Mar 2019 09:53) (85 - 100)  BP: 137/87 (30 Mar 2019 01:20) (99/61 - 137/87)  BP(mean): --  RR: 16 (30 Mar 2019 01:20) (16 - 18)  SpO2: 95% (30 Mar 2019 09:53) (95% - 99%)    I&O's Summary    29 Mar 2019 07:01  -  30 Mar 2019 07:00  --------------------------------------------------------  IN: 300 mL / OUT: 200 mL / NET: 100 mL          Physical Exam:   GENERAL: Obese+  HEENT: KWAME/   Atraumatic, Normocephalic  ENMT: No tonsillar erythema, exudates, or enlargement; Moist mucous membranes, Good dentition, No lesions  NECK: Supple, No JVD, Normal thyroid  CHEST/LUNG: no wheezing  CVS: Regular rate and rhythm; No murmurs, rubs, or gallops  GI: : Soft, Nontender, Nondistended; Bowel sounds present  NERVOUS SYSTEM:  Alert & Oriented X3  EXTREMITIES:- edema  LYMPH: No lymphadenopathy noted  SKIN: No rashes or lesions  ENDOCRINOLOGY: No Thyromegaly  PSYCH: Appropriate    Labs:                              10.3   10.5  )-----------( 479      ( 29 Mar 2019 09:31 )             32.4                         9.3    9.16  )-----------( 345      ( 27 Mar 2019 09:56 )             31.1     03-28    136  |  95<L>  |  19  ----------------------------<  197<H>  4.2   |  24  |  0.71  03-27    137  |  98  |  16  ----------------------------<  192<H>  4.5   |  24  |  0.76        CAPILLARY BLOOD GLUCOSE      POCT Blood Glucose.: 118 mg/dL (30 Mar 2019 08:30)  POCT Blood Glucose.: 85 mg/dL (29 Mar 2019 22:10)  POCT Blood Glucose.: 145 mg/dL (29 Mar 2019 17:05)  POCT Blood Glucose.: 285 mg/dL (29 Mar 2019 12:10)  POCT Blood Glucose.: 297 mg/dL (29 Mar 2019 12:09)    < from: [facility] Duplex Lower Ext Vein Scan, Nevaeh (03.28.19 @ 15:07) >    EXAM:  DUPLEX SCAN EXT VEINS LOWER BI                            PROCEDURE DATE:  03/28/2019            INTERPRETATION:  CLINICAL INFORMATION: Shortness of breath. Patient   states history of DVT. Limited mobility. Lower extremity swelling. Right   lower extremity pain.    COMPARISON: 1/30/2019.    TECHNIQUE: Duplex sonography of the BILATERAL LOWER extremities with   color and spectral Doppler, with and without compression.      FINDINGS:    There is normal compressibility of the bilateral common femoral, femoral   and popliteal veins. Bilateral calf veins cannot be visualized due to   swelling and patient's body habitus.    Doppler examination shows normal spontaneous and phasic flow in the   visualized venous segments.    IMPRESSION:     No evidence of bilateral proximal lower extremity deep venous thrombosis.   Bilateral calf veins not visualized and therefore not evaluated due to   swelling and patient's body habitus.                        ERINN HERNDON M.D., ATTENDING RADIOLOGIST  This document has been electronically signed. Mar 28 2019  3:32PM        < end of copied text >                RECENT CULTURES:        RESPIRATORY CULTURES:          Studies  Chest X-RAY  CT SCAN Chest   Venous Dopplers: LE:   CT Abdomen  Others

## 2019-03-30 NOTE — PROGRESS NOTE ADULT - ASSESSMENT
Problem/Plan - 1:  ·  Problem: HAP (hospital-acquired pneumonia).  Plan: cw vanco and angela  id and pulmonary fu  will monitor.   spoke with pulmonary , CT sinus not needed a inpt, can be done as outpt     Problem/Plan - 2:  ·  Problem: Chronic hypoxic respiratory failure , Oxygen dependent.  Plan: bipap at night   pulmonary fu.   no need for CT sinus as inpt     Problem/Plan - 3:  ·  Problem: Diabetes.  Plan: monitor FS  ISS.     Problem/Plan - 4:  ·  Problem: foot drop.  Plan: rheum fu appreciated  chronic   no inpt [facility] as per neuro    dc planning

## 2019-03-31 LAB
GLUCOSE BLDC GLUCOMTR-MCNC: 104 MG/DL — HIGH (ref 70–99)
GLUCOSE BLDC GLUCOMTR-MCNC: 135 MG/DL — HIGH (ref 70–99)
GLUCOSE BLDC GLUCOMTR-MCNC: 156 MG/DL — HIGH (ref 70–99)
GLUCOSE BLDC GLUCOMTR-MCNC: 186 MG/DL — HIGH (ref 70–99)

## 2019-03-31 RX ORDER — OXYCODONE HYDROCHLORIDE 5 MG/1
10 TABLET ORAL EVERY 4 HOURS
Qty: 0 | Refills: 0 | Status: DISCONTINUED | OUTPATIENT
Start: 2019-03-31 | End: 2019-04-07

## 2019-03-31 RX ADMIN — TRAMADOL HYDROCHLORIDE 50 MILLIGRAM(S): 50 TABLET ORAL at 06:41

## 2019-03-31 RX ADMIN — Medication 3 MILLILITER(S): at 17:42

## 2019-03-31 RX ADMIN — Medication 3 MILLILITER(S): at 11:13

## 2019-03-31 RX ADMIN — Medication 1 GRAM(S): at 17:42

## 2019-03-31 RX ADMIN — Medication 1: at 13:03

## 2019-03-31 RX ADMIN — Medication 200 MILLIGRAM(S): at 23:37

## 2019-03-31 RX ADMIN — ENOXAPARIN SODIUM 40 MILLIGRAM(S): 100 INJECTION SUBCUTANEOUS at 13:06

## 2019-03-31 RX ADMIN — Medication 100 MILLIGRAM(S): at 13:03

## 2019-03-31 RX ADMIN — Medication 1: at 08:38

## 2019-03-31 RX ADMIN — OXYCODONE HYDROCHLORIDE 10 MILLIGRAM(S): 5 TABLET ORAL at 07:24

## 2019-03-31 RX ADMIN — Medication 1 SPRAY(S): at 06:47

## 2019-03-31 RX ADMIN — Medication 3 MILLILITER(S): at 06:42

## 2019-03-31 RX ADMIN — Medication 1 SPRAY(S): at 09:52

## 2019-03-31 RX ADMIN — Medication 10 UNIT(S): at 13:03

## 2019-03-31 RX ADMIN — PANTOPRAZOLE SODIUM 40 MILLIGRAM(S): 20 TABLET, DELAYED RELEASE ORAL at 06:41

## 2019-03-31 RX ADMIN — OXYCODONE HYDROCHLORIDE 10 MILLIGRAM(S): 5 TABLET ORAL at 01:00

## 2019-03-31 RX ADMIN — Medication 100 MILLIGRAM(S): at 21:15

## 2019-03-31 RX ADMIN — PANTOPRAZOLE SODIUM 40 MILLIGRAM(S): 20 TABLET, DELAYED RELEASE ORAL at 17:42

## 2019-03-31 RX ADMIN — Medication 100 MILLIGRAM(S): at 06:40

## 2019-03-31 RX ADMIN — Medication 1 GRAM(S): at 23:37

## 2019-03-31 RX ADMIN — OXYCODONE HYDROCHLORIDE 10 MILLIGRAM(S): 5 TABLET ORAL at 13:03

## 2019-03-31 RX ADMIN — OXYCODONE HYDROCHLORIDE 10 MILLIGRAM(S): 5 TABLET ORAL at 13:33

## 2019-03-31 RX ADMIN — Medication 20 MILLIGRAM(S): at 06:41

## 2019-03-31 RX ADMIN — OXYCODONE HYDROCHLORIDE 10 MILLIGRAM(S): 5 TABLET ORAL at 19:01

## 2019-03-31 RX ADMIN — Medication 200 MILLIGRAM(S): at 11:13

## 2019-03-31 RX ADMIN — GABAPENTIN 300 MILLIGRAM(S): 400 CAPSULE ORAL at 13:03

## 2019-03-31 RX ADMIN — GABAPENTIN 300 MILLIGRAM(S): 400 CAPSULE ORAL at 21:15

## 2019-03-31 RX ADMIN — OXYCODONE HYDROCHLORIDE 10 MILLIGRAM(S): 5 TABLET ORAL at 18:32

## 2019-03-31 RX ADMIN — OXYCODONE HYDROCHLORIDE 10 MILLIGRAM(S): 5 TABLET ORAL at 07:54

## 2019-03-31 RX ADMIN — OXYCODONE HYDROCHLORIDE 10 MILLIGRAM(S): 5 TABLET ORAL at 00:21

## 2019-03-31 RX ADMIN — Medication 20 MILLIGRAM(S): at 21:15

## 2019-03-31 RX ADMIN — FENTANYL CITRATE 1 PATCH: 50 INJECTION INTRAVENOUS at 19:10

## 2019-03-31 RX ADMIN — BUDESONIDE AND FORMOTEROL FUMARATE DIHYDRATE 2 PUFF(S): 160; 4.5 AEROSOL RESPIRATORY (INHALATION) at 17:42

## 2019-03-31 RX ADMIN — BUDESONIDE AND FORMOTEROL FUMARATE DIHYDRATE 2 PUFF(S): 160; 4.5 AEROSOL RESPIRATORY (INHALATION) at 06:42

## 2019-03-31 RX ADMIN — Medication 200 MILLIGRAM(S): at 06:40

## 2019-03-31 RX ADMIN — Medication 10 UNIT(S): at 17:43

## 2019-03-31 RX ADMIN — SENNA PLUS 2 TABLET(S): 8.6 TABLET ORAL at 21:15

## 2019-03-31 RX ADMIN — TRAMADOL HYDROCHLORIDE 50 MILLIGRAM(S): 50 TABLET ORAL at 18:42

## 2019-03-31 RX ADMIN — FENTANYL CITRATE 1 PATCH: 50 INJECTION INTRAVENOUS at 07:07

## 2019-03-31 RX ADMIN — SIMVASTATIN 20 MILLIGRAM(S): 20 TABLET, FILM COATED ORAL at 21:15

## 2019-03-31 RX ADMIN — GABAPENTIN 300 MILLIGRAM(S): 400 CAPSULE ORAL at 06:40

## 2019-03-31 RX ADMIN — Medication 20 MILLIGRAM(S): at 06:43

## 2019-03-31 RX ADMIN — OXYCODONE HYDROCHLORIDE 10 MILLIGRAM(S): 5 TABLET ORAL at 23:37

## 2019-03-31 RX ADMIN — Medication 40 MILLIGRAM(S): at 17:42

## 2019-03-31 RX ADMIN — POLYETHYLENE GLYCOL 3350 17 GRAM(S): 17 POWDER, FOR SOLUTION ORAL at 17:42

## 2019-03-31 RX ADMIN — Medication 10 UNIT(S): at 08:39

## 2019-03-31 RX ADMIN — TRAMADOL HYDROCHLORIDE 50 MILLIGRAM(S): 50 TABLET ORAL at 17:43

## 2019-03-31 RX ADMIN — Medication 1 GRAM(S): at 11:13

## 2019-03-31 RX ADMIN — Medication 1 GRAM(S): at 06:41

## 2019-03-31 RX ADMIN — PRIMIDONE 50 MILLIGRAM(S): 250 TABLET ORAL at 11:13

## 2019-03-31 RX ADMIN — TRAMADOL HYDROCHLORIDE 50 MILLIGRAM(S): 50 TABLET ORAL at 07:00

## 2019-03-31 RX ADMIN — Medication 40 MILLIGRAM(S): at 06:40

## 2019-03-31 RX ADMIN — Medication 137 MICROGRAM(S): at 06:41

## 2019-03-31 RX ADMIN — Medication 20 MILLIGRAM(S): at 13:03

## 2019-03-31 RX ADMIN — Medication 200 MILLIGRAM(S): at 17:42

## 2019-03-31 RX ADMIN — POLYETHYLENE GLYCOL 3350 17 GRAM(S): 17 POWDER, FOR SOLUTION ORAL at 06:40

## 2019-03-31 RX ADMIN — INSULIN GLARGINE 20 UNIT(S): 100 INJECTION, SOLUTION SUBCUTANEOUS at 21:28

## 2019-03-31 RX ADMIN — Medication 3 MILLILITER(S): at 23:37

## 2019-03-31 NOTE — PROGRESS NOTE ADULT - SUBJECTIVE AND OBJECTIVE BOX
Patient is a 63y old  Female who presents with a chief complaint of sob (31 Mar 2019 09:03)      Any change in ROS: Saying breathing wise she is better: no resp complaints:  using bipap at night and likes it:       MEDICATIONS  (STANDING):  ALBUTerol/ipratropium for Nebulization 3 milliLiter(s) Nebulizer every 6 hours  buDESOnide  80 MICROgram(s)/formoterol 4.5 MICROgram(s) Inhaler 2 Puff(s) Inhalation two times a day  dextrose 5%. 1000 milliLiter(s) (50 mL/Hr) IV Continuous <Continuous>  dextrose 50% Injectable 12.5 Gram(s) IV Push once  dextrose 50% Injectable 25 Gram(s) IV Push once  dextrose 50% Injectable 25 Gram(s) IV Push once  docusate sodium 100 milliGRAM(s) Oral three times a day  enoxaparin Injectable 40 milliGRAM(s) SubCutaneous every 24 hours  fentaNYL   Patch  25 MICROgram(s)/Hr 1 Patch Transdermal every 72 hours  furosemide    Tablet 40 milliGRAM(s) Oral two times a day  gabapentin 300 milliGRAM(s) Oral three times a day  guaiFENesin   Syrup  (Sugar-Free) 200 milliGRAM(s) Oral every 6 hours  influenza   Vaccine 0.5 milliLiter(s) IntraMuscular once  insulin glargine Injectable (LANTUS) 20 Unit(s) SubCutaneous at bedtime  insulin lispro (HumaLOG) corrective regimen sliding scale   SubCutaneous three times a day before meals  insulin lispro Injectable (HumaLOG) 10 Unit(s) SubCutaneous three times a day before meals  levothyroxine 137 MICROGram(s) Oral daily  pantoprazole    Tablet 40 milliGRAM(s) Oral two times a day  polyethylene glycol 3350 17 Gram(s) Oral two times a day  predniSONE   Tablet 20 milliGRAM(s) Oral daily  primidone 50 milliGRAM(s) Oral daily  senna 2 Tablet(s) Oral at bedtime  sildenafil (REVATIO) 20 milliGRAM(s) Oral three times a day  simvastatin 20 milliGRAM(s) Oral at bedtime  sucralfate 1 Gram(s) Oral four times a day  traMADol 50 milliGRAM(s) Oral two times a day    MEDICATIONS  (PRN):  dextrose 40% Gel 15 Gram(s) Oral once PRN Blood Glucose LESS THAN 70 milliGRAM(s)/deciliter  glucagon  Injectable 1 milliGRAM(s) IntraMuscular once PRN Glucose LESS THAN 70 milligrams/deciliter  ondansetron Injectable 4 milliGRAM(s) IV Push every 6 hours PRN Nausea  oxyCODONE    IR 10 milliGRAM(s) Oral every 4 hours PRN Moderate Pain (4 - 6)  sodium chloride 0.65% Nasal 1 Spray(s) Both Nostrils every 4 hours PRN Nasal Congestion    Vital Signs Last 24 Hrs  T(C): 36.6 (31 Mar 2019 07:55), Max: 37.1 (30 Mar 2019 16:35)  T(F): 97.9 (31 Mar 2019 07:55), Max: 98.8 (30 Mar 2019 16:35)  HR: 98 (31 Mar 2019 09:27) (95 - 113)  BP: 141/83 (31 Mar 2019 07:55) (109/67 - 141/83)  BP(mean): --  RR: 18 (31 Mar 2019 07:55) (18 - 18)  SpO2: 96% (31 Mar 2019 09:27) (95% - 100%)    I&O's Summary    30 Mar 2019 07:01  -  31 Mar 2019 07:00  --------------------------------------------------------  IN: 250 mL / OUT: 200 mL / NET: 50 mL    31 Mar 2019 07:01  -  31 Mar 2019 12:08  --------------------------------------------------------  IN: 0 mL / OUT: 500 mL / NET: -500 mL          Physical Exam:   GENERAL: Obese  HEENT: KWAME/   Atraumatic, Normocephalic  ENMT: No tonsillar erythema, exudates, or enlargement; Moist mucous membranes, Good dentition, No lesions  NECK: Supple, No JVD, Normal thyroid  CHEST/LUNG: Clear to ausculation no wheezing: bibasilar crackles+  CVS: Regular rate and rhythm; No murmurs, rubs, or gallops  GI: : Soft, Nontender, Nondistended; Bowel sounds present  NERVOUS SYSTEM:  Alert & Oriented X3  EXTREMITIES:-edema  LYMPH: No lymphadenopathy noted  SKIN: No rashes or lesions  ENDOCRINOLOGY: No Thyromegaly  PSYCH: Appropriate    Labs:                              10.3   10.5  )-----------( 479      ( 29 Mar 2019 09:31 )             32.4     03-28    136  |  95<L>  |  19  ----------------------------<  197<H>  4.2   |  24  |  0.71        CAPILLARY BLOOD GLUCOSE      POCT Blood Glucose.: 156 mg/dL (31 Mar 2019 08:17)  POCT Blood Glucose.: 137 mg/dL (30 Mar 2019 21:49)  POCT Blood Glucose.: 94 mg/dL (30 Mar 2019 21:35)  POCT Blood Glucose.: 83 mg/dL (30 Mar 2019 21:18)  POCT Blood Glucose.: 112 mg/dL (30 Mar 2019 18:21)  POCT Blood Glucose.: 145 mg/dL (30 Mar 2019 12:18)          < from: CT Head No Cont (03.30.19 @ 17:37) >      PROCEDURE DATE:  03/30/2019            INTERPRETATION:  INDICATIONS:  head ache  .    TECHNIQUE:  Serial axial images were obtained from the skull base to the   vertex without intravenous contrast. [place] sagittal reformatted   images were obtained.    COMPARISON EXAMINATION: 7/16/2011    FINDINGS:    VENTRICLES AND SULCI:  Prominent in size compatible with age-appropriate   volume loss    INTRA-AXIAL:  Areas of white matter hypodensity are seen within the   cerebral hemispheres bilaterally compatible with moderate   microvascular-type changes. No mass, blood or abnormal attenuation is   otherwise seen.    EXTRA-AXIAL:  No mass or collection is seen.    VISUALIZED SINUSES:  Clear.    VISUALIZED MASTOIDS:  Clear.    CALVARIUM:  Normal.    MISCELLANEOUS:  Bilateral proptosis. No retrobulbar mass is seen.   Bilateral ocular lens implants.      IMPRESSION:    No mass effect, hemorrhage or evidence of acute intracranial pathology.        < from: Xray Chest 1 View- PORTABLE-Urgent (01.29.19 @ 16:44) >    COMPARISON:  January 26, 2018 at 4:56 PM.    TECHNIQUE:   AP Portable chest x-ray.    INTERPRETATION:     Heart size and the mediastinum cannot be accurately evaluated on this   projection.  Previous enteric tube and left IJ line are no longer seen.  Bilateral patchy opacities with relative sparing of the left apex are not   significantly changed. Left retrocardiac opacity is again noted as well.  No pneumothorax is seen. Extensive osteoarthritic degenerative change of   the right shoulder is seen.              IMPRESSION:  No significant changein bilateral patchy opacities which   could be due to multifocal pneumonia.    Continued left retrocardiac opacity which could be due to atelectasis   and/or pneumonia. A small left pleural effusion with passive atelectasis   is also possible.                  LLOYD HARDEN M.D., ATTENDING RADIOLOGIST  This document has been electronically signed. Jan 29 2019  5:10PM        < end of copied text >              LLOYD RODRIGUEZ M.D., ATTENDING RADIOLOGIST  This document has been electronically signed. Mar 31 2019  8:15AM    < end of copied text >          RECENT CULTURES:        RESPIRATORY CULTURES:          Studies  Chest X-RAY  CT SCAN Chest   Venous Dopplers: LE:   CT Abdomen  Others

## 2019-03-31 NOTE — PROGRESS NOTE ADULT - ATTENDING COMMENTS
CARDIOLOGY ATTENDING    Agree with above. No further inpatient cardiac workup needed. D/C planning as per primary team

## 2019-03-31 NOTE — PROGRESS NOTE ADULT - SUBJECTIVE AND OBJECTIVE BOX
pt seen and examined,  no  chest pain , ROS     ALBUTerol/ipratropium for Nebulization 3 milliLiter(s) Nebulizer every 6 hours  buDESOnide  80 MICROgram(s)/formoterol 4.5 MICROgram(s) Inhaler 2 Puff(s) Inhalation two times a day  dextrose 40% Gel 15 Gram(s) Oral once PRN  dextrose 5%. 1000 milliLiter(s) IV Continuous <Continuous>  dextrose 50% Injectable 12.5 Gram(s) IV Push once  dextrose 50% Injectable 25 Gram(s) IV Push once  dextrose 50% Injectable 25 Gram(s) IV Push once  docusate sodium 100 milliGRAM(s) Oral three times a day  enoxaparin Injectable 40 milliGRAM(s) SubCutaneous every 24 hours  fentaNYL   Patch  25 MICROgram(s)/Hr 1 Patch Transdermal every 72 hours  furosemide    Tablet 40 milliGRAM(s) Oral two times a day  gabapentin 300 milliGRAM(s) Oral three times a day  glucagon  Injectable 1 milliGRAM(s) IntraMuscular once PRN  guaiFENesin   Syrup  (Sugar-Free) 200 milliGRAM(s) Oral every 6 hours  influenza   Vaccine 0.5 milliLiter(s) IntraMuscular once  insulin glargine Injectable (LANTUS) 20 Unit(s) SubCutaneous at bedtime  insulin lispro (HumaLOG) corrective regimen sliding scale   SubCutaneous three times a day before meals  insulin lispro Injectable (HumaLOG) 10 Unit(s) SubCutaneous three times a day before meals  levothyroxine 137 MICROGram(s) Oral daily  ondansetron Injectable 4 milliGRAM(s) IV Push every 6 hours PRN  oxyCODONE    IR 10 milliGRAM(s) Oral every 4 hours PRN  pantoprazole    Tablet 40 milliGRAM(s) Oral two times a day  polyethylene glycol 3350 17 Gram(s) Oral two times a day  predniSONE   Tablet 20 milliGRAM(s) Oral daily  primidone 50 milliGRAM(s) Oral daily  senna 2 Tablet(s) Oral at bedtime  sildenafil (REVATIO) 20 milliGRAM(s) Oral three times a day  simvastatin 20 milliGRAM(s) Oral at bedtime  sodium chloride 0.65% Nasal 1 Spray(s) Both Nostrils every 4 hours PRN  sucralfate 1 Gram(s) Oral four times a day  traMADol 50 milliGRAM(s) Oral two times a day                            10.3   10.5  )-----------( 479      ( 29 Mar 2019 09:31 )             32.4       Hemoglobin: 10.3 g/dL (03-29 @ 09:31)  Hemoglobin: 9.3 g/dL (03-27 @ 09:56)            Creatinine Trend: 0.71<--, 0.76<--, 0.86<--, 0.97<--, 1.00<--, 1.02<--    COAGS:           T(C): 36.6 (03-31-19 @ 07:55), Max: 37.1 (03-30-19 @ 16:35)  HR: 106 (03-31-19 @ 07:55) (90 - 113)  BP: 141/83 (03-31-19 @ 07:55) (109/67 - 141/83)  RR: 18 (03-31-19 @ 07:55) (18 - 18)  SpO2: 95% (03-31-19 @ 07:55) (95% - 100%)  Wt(kg): --    I&O's Summary    30 Mar 2019 07:01  -  31 Mar 2019 07:00  --------------------------------------------------------  IN: 250 mL / OUT: 200 mL / NET: 50 mL    31 Mar 2019 07:01  -  31 Mar 2019 09:04  --------------------------------------------------------  IN: 0 mL / OUT: 200 mL / NET: -200 mL      Gen: Appears well in NAD  HEENT:  (-)icterus (-)pallor  CV: N S1 S2 1/6 OJ (+)2 Pulses B/l  Resp:  Clear to ausculatation B/L, normal effort  GI: (+) BS Soft, NT, ND  Lymph:  (-)Edema, (-)obvious lymphadenopathy  Skin: Warm to touch, Normal turgor  Psych: Appropriate mood and affect      ECG:  	sinus Tachycardia 109 BPM, nonspecific T wave abnormality    RADIOLOGY:         CXR:  < from: Xray Chest 1 View- PORTABLE-Urgent (03.21.19 @ 11:18) >  The heart is enlarged. Improvingopacities in both lungs when compared to   previous study done on March 6, 2019. Those changes are compatible with   interstitial fluid however an infectious process cannot be ruled out   entirely. Severe degenerative changes of the right shoulder. Degenerative   changes of the thoracic spine is evident as well.    < end of copied text >      ECHO: pending < from: Transthoracic Echocardiogram (03.26.19 @ 10:29) >  Conclusions:  1. Increased relative wall thickness with normal left  ventricular mass index, consistent with concentric left  ventricular remodeling.  2. Hyperdynamic left ventricular systolic function.  3. Estimated pulmonary artery systolic pressure equals 42  mm Hg, assuming right atrial pressureequals 8 mm Hg,  consistent with mild pulmonary pressures.    < end of copied text >      ASSESSMENT/PLAN: 	63y Female  HTN, DM2, COPD/CHRIS on nocturnal CPAP, normal LV function un echo 1/19 moderate pulmonary HTN discharged yesterday from [facility] after a prolonged hospitalization for SBO requiring lysis of adhesions, complicated by hypercapneic respiratory failure requiring intubation now being treated for HCAP..    - 3/26 ECHO ,   hyperdynamic LV fx   - no further CV work up needed at present  - cont diuretics , keep net neg   - Nutritional support   -  cont All meds for pulm htn    -  GI / DVT prophylaxis,  keep K>4, mag >2.0  D/W Dr Joseph

## 2019-03-31 NOTE — PROGRESS NOTE ADULT - ASSESSMENT
63yof w/ HTN, DM2, COPD/CHRIS on nocturnal CPAP, discharged from [facility] after a prolonged hospitalization for SBO requiring lysis of adhesions, complicated by hypercapneic respiratory failure requiring intubation. Pt awoke with shortness of breath and chest tightness, was supposed to use CPAP last night but nursing facility did not have the machine. Pt oxygen dependent on 4L at baseline. Feeling better now with supplemental oxygen. No fevers or chills. Occasional cough.  originally admitted on 12/15 for SBO s/p ex lap with lysis of adhesions (4 retention sutures) c/b 12/30 she eviscerated 2/2 a coughing episode s/p ex lap (6 retention sutures) c/b multifocal pneumonia, developed hypercapnic respiratory failure requiring intubation 1/6, s/p bronch 1/9/18, and extubated on 1/24, course further c/b Enterococci bacteremia now readmitted to MICU for hypoxic respiratory failure 2/2 atelectasis requiring intubation 01/31. Had grown ESBL in urine, s/p ertapenem.  Extubated to high flow/ BIPAP.

## 2019-03-31 NOTE — PROGRESS NOTE ADULT - ASSESSMENT
Problem/Plan - 1:  ·  Problem: HAP (hospital-acquired pneumonia).  Plan: cw vanco and angela  id and pulmonary fu  will monitor.   spoke with pulmonary , CT sinus not needed a inpt, can be done as outpt     Problem/Plan - 2:  ·  Problem: Chronic hypoxic respiratory failure , Oxygen dependent.  Plan: bipap at night   pulmonary fu.   no need for CT sinus as inpt     Problem/Plan - 3:  ·  Problem: Diabetes.  Plan: monitor FS  ISS.     Problem/Plan - 4:  ·  Problem: foot drop.  Plan: rheum fu appreciated  chronic   no inpt sherman as per neuro    dc planning in am

## 2019-03-31 NOTE — PROGRESS NOTE ADULT - SUBJECTIVE AND OBJECTIVE BOX
Patient is a 63y old  Female who presents with a chief complaint of sob (31 Mar 2019 12:08)      INTERVAL HPI/OVERNIGHT EVENTS:  T(C): 37.1 (03-31-19 @ 16:19), Max: 37.1 (03-31-19 @ 00:00)  HR: 100 (03-31-19 @ 16:19) (95 - 113)  BP: 109/60 (03-31-19 @ 16:19) (109/60 - 141/83)  RR: 18 (03-31-19 @ 16:19) (18 - 18)  SpO2: 97% (03-31-19 @ 16:19) (95% - 99%)  Wt(kg): --  I&O's Summary    30 Mar 2019 07:01  -  31 Mar 2019 07:00  --------------------------------------------------------  IN: 250 mL / OUT: 200 mL / NET: 50 mL    31 Mar 2019 07:01  -  31 Mar 2019 18:38  --------------------------------------------------------  IN: 0 mL / OUT: 750 mL / NET: -750 mL        LABS:              CAPILLARY BLOOD GLUCOSE      POCT Blood Glucose.: 135 mg/dL (31 Mar 2019 17:12)  POCT Blood Glucose.: 186 mg/dL (31 Mar 2019 12:51)  POCT Blood Glucose.: 156 mg/dL (31 Mar 2019 08:17)  POCT Blood Glucose.: 137 mg/dL (30 Mar 2019 21:49)  POCT Blood Glucose.: 94 mg/dL (30 Mar 2019 21:35)  POCT Blood Glucose.: 83 mg/dL (30 Mar 2019 21:18)            MEDICATIONS  (STANDING):  ALBUTerol/ipratropium for Nebulization 3 milliLiter(s) Nebulizer every 6 hours  buDESOnide  80 MICROgram(s)/formoterol 4.5 MICROgram(s) Inhaler 2 Puff(s) Inhalation two times a day  dextrose 5%. 1000 milliLiter(s) (50 mL/Hr) IV Continuous <Continuous>  dextrose 50% Injectable 12.5 Gram(s) IV Push once  dextrose 50% Injectable 25 Gram(s) IV Push once  dextrose 50% Injectable 25 Gram(s) IV Push once  docusate sodium 100 milliGRAM(s) Oral three times a day  enoxaparin Injectable 40 milliGRAM(s) SubCutaneous every 24 hours  fentaNYL   Patch  25 MICROgram(s)/Hr 1 Patch Transdermal every 72 hours  furosemide    Tablet 40 milliGRAM(s) Oral two times a day  gabapentin 300 milliGRAM(s) Oral three times a day  guaiFENesin   Syrup  (Sugar-Free) 200 milliGRAM(s) Oral every 6 hours  influenza   Vaccine 0.5 milliLiter(s) IntraMuscular once  insulin glargine Injectable (LANTUS) 20 Unit(s) SubCutaneous at bedtime  insulin lispro (HumaLOG) corrective regimen sliding scale   SubCutaneous three times a day before meals  insulin lispro Injectable (HumaLOG) 10 Unit(s) SubCutaneous three times a day before meals  levothyroxine 137 MICROGram(s) Oral daily  pantoprazole    Tablet 40 milliGRAM(s) Oral two times a day  polyethylene glycol 3350 17 Gram(s) Oral two times a day  predniSONE   Tablet 20 milliGRAM(s) Oral daily  primidone 50 milliGRAM(s) Oral daily  senna 2 Tablet(s) Oral at bedtime  sildenafil (REVATIO) 20 milliGRAM(s) Oral three times a day  simvastatin 20 milliGRAM(s) Oral at bedtime  sucralfate 1 Gram(s) Oral four times a day  traMADol 50 milliGRAM(s) Oral two times a day    MEDICATIONS  (PRN):  dextrose 40% Gel 15 Gram(s) Oral once PRN Blood Glucose LESS THAN 70 milliGRAM(s)/deciliter  glucagon  Injectable 1 milliGRAM(s) IntraMuscular once PRN Glucose LESS THAN 70 milligrams/deciliter  ondansetron Injectable 4 milliGRAM(s) IV Push every 6 hours PRN Nausea  oxyCODONE    IR 10 milliGRAM(s) Oral every 4 hours PRN Moderate Pain (4 - 6)  sodium chloride 0.65% Nasal 1 Spray(s) Both Nostrils every 4 hours PRN Nasal Congestion          PHYSICAL EXAM:  GENERAL: NAD, well-groomed, well-developed  HEAD:  Atraumatic, Normocephalic  CHEST/LUNG: Clear to percussion bilaterally; No rales, rhonchi, wheezing, or rubs  HEART: Regular rate and rhythm; No murmurs, rubs, or gallops  ABDOMEN: Soft, Nontender, Nondistended; Bowel sounds present  EXTREMITIES:  2+ Peripheral Pulses, No clubbing, cyanosis, or edema  LYMPH: No lymphadenopathy noted  SKIN: No rashes or lesions    Care Discussed with Consultants/Other Providers [ ] YES  [ ] NO

## 2019-03-31 NOTE — PROGRESS NOTE ADULT - ATTENDING COMMENTS
pt seems stable from pulm point of view: finished HAP  rx: on bipap at night: She is alert and awake: She would need follow up ct chest in 6 -8 weeks time: DC Planning:  3/31: PT IS STABLE FROM PULMONARY POINT OF VIEW: FINISHED ANTIBIOTICS: TOLERATING BIPAP

## 2019-04-01 ENCOUNTER — APPOINTMENT (OUTPATIENT)
Dept: ENDOCRINOLOGY | Facility: CLINIC | Age: 64
End: 2019-04-01

## 2019-04-01 LAB
GLUCOSE BLDC GLUCOMTR-MCNC: 115 MG/DL — HIGH (ref 70–99)
GLUCOSE BLDC GLUCOMTR-MCNC: 137 MG/DL — HIGH (ref 70–99)
GLUCOSE BLDC GLUCOMTR-MCNC: 170 MG/DL — HIGH (ref 70–99)
GLUCOSE BLDC GLUCOMTR-MCNC: 222 MG/DL — HIGH (ref 70–99)

## 2019-04-01 PROCEDURE — 93010 ELECTROCARDIOGRAM REPORT: CPT

## 2019-04-01 RX ORDER — NYSTATIN CREAM 100000 [USP'U]/G
1 CREAM TOPICAL
Qty: 0 | Refills: 0 | Status: DISCONTINUED | OUTPATIENT
Start: 2019-04-01 | End: 2019-04-09

## 2019-04-01 RX ORDER — OXYCODONE HYDROCHLORIDE 5 MG/1
5 TABLET ORAL ONCE
Qty: 0 | Refills: 0 | Status: DISCONTINUED | OUTPATIENT
Start: 2019-04-01 | End: 2019-04-01

## 2019-04-01 RX ADMIN — Medication 200 MILLIGRAM(S): at 06:49

## 2019-04-01 RX ADMIN — Medication 20 MILLIGRAM(S): at 06:47

## 2019-04-01 RX ADMIN — OXYCODONE HYDROCHLORIDE 10 MILLIGRAM(S): 5 TABLET ORAL at 00:10

## 2019-04-01 RX ADMIN — Medication 3 MILLILITER(S): at 11:51

## 2019-04-01 RX ADMIN — Medication 20 MILLIGRAM(S): at 06:48

## 2019-04-01 RX ADMIN — PRIMIDONE 50 MILLIGRAM(S): 250 TABLET ORAL at 11:51

## 2019-04-01 RX ADMIN — Medication 200 MILLIGRAM(S): at 17:09

## 2019-04-01 RX ADMIN — Medication 20 MILLIGRAM(S): at 21:31

## 2019-04-01 RX ADMIN — Medication 1: at 13:08

## 2019-04-01 RX ADMIN — FENTANYL CITRATE 1 PATCH: 50 INJECTION INTRAVENOUS at 07:00

## 2019-04-01 RX ADMIN — SIMVASTATIN 20 MILLIGRAM(S): 20 TABLET, FILM COATED ORAL at 21:25

## 2019-04-01 RX ADMIN — OXYCODONE HYDROCHLORIDE 10 MILLIGRAM(S): 5 TABLET ORAL at 17:07

## 2019-04-01 RX ADMIN — Medication 100 MILLIGRAM(S): at 13:09

## 2019-04-01 RX ADMIN — GABAPENTIN 300 MILLIGRAM(S): 400 CAPSULE ORAL at 21:25

## 2019-04-01 RX ADMIN — BUDESONIDE AND FORMOTEROL FUMARATE DIHYDRATE 2 PUFF(S): 160; 4.5 AEROSOL RESPIRATORY (INHALATION) at 06:48

## 2019-04-01 RX ADMIN — TRAMADOL HYDROCHLORIDE 50 MILLIGRAM(S): 50 TABLET ORAL at 17:26

## 2019-04-01 RX ADMIN — Medication 100 MILLIGRAM(S): at 21:25

## 2019-04-01 RX ADMIN — OXYCODONE HYDROCHLORIDE 10 MILLIGRAM(S): 5 TABLET ORAL at 21:23

## 2019-04-01 RX ADMIN — Medication 20 MILLIGRAM(S): at 13:12

## 2019-04-01 RX ADMIN — GABAPENTIN 300 MILLIGRAM(S): 400 CAPSULE ORAL at 06:47

## 2019-04-01 RX ADMIN — INSULIN GLARGINE 20 UNIT(S): 100 INJECTION, SOLUTION SUBCUTANEOUS at 22:40

## 2019-04-01 RX ADMIN — Medication 1 GRAM(S): at 11:51

## 2019-04-01 RX ADMIN — OXYCODONE HYDROCHLORIDE 10 MILLIGRAM(S): 5 TABLET ORAL at 12:50

## 2019-04-01 RX ADMIN — Medication 3 MILLILITER(S): at 17:09

## 2019-04-01 RX ADMIN — OXYCODONE HYDROCHLORIDE 5 MILLIGRAM(S): 5 TABLET ORAL at 23:37

## 2019-04-01 RX ADMIN — OXYCODONE HYDROCHLORIDE 10 MILLIGRAM(S): 5 TABLET ORAL at 22:25

## 2019-04-01 RX ADMIN — BUDESONIDE AND FORMOTEROL FUMARATE DIHYDRATE 2 PUFF(S): 160; 4.5 AEROSOL RESPIRATORY (INHALATION) at 17:11

## 2019-04-01 RX ADMIN — Medication 40 MILLIGRAM(S): at 17:08

## 2019-04-01 RX ADMIN — Medication 3 MILLILITER(S): at 06:48

## 2019-04-01 RX ADMIN — Medication 10 UNIT(S): at 17:10

## 2019-04-01 RX ADMIN — Medication 1 GRAM(S): at 06:48

## 2019-04-01 RX ADMIN — SENNA PLUS 2 TABLET(S): 8.6 TABLET ORAL at 21:25

## 2019-04-01 RX ADMIN — TRAMADOL HYDROCHLORIDE 50 MILLIGRAM(S): 50 TABLET ORAL at 07:00

## 2019-04-01 RX ADMIN — PANTOPRAZOLE SODIUM 40 MILLIGRAM(S): 20 TABLET, DELAYED RELEASE ORAL at 06:47

## 2019-04-01 RX ADMIN — Medication 137 MICROGRAM(S): at 06:47

## 2019-04-01 RX ADMIN — Medication 100 MILLIGRAM(S): at 06:47

## 2019-04-01 RX ADMIN — FENTANYL CITRATE 1 PATCH: 50 INJECTION INTRAVENOUS at 19:00

## 2019-04-01 RX ADMIN — NYSTATIN CREAM 1 APPLICATION(S): 100000 CREAM TOPICAL at 17:12

## 2019-04-01 RX ADMIN — TRAMADOL HYDROCHLORIDE 50 MILLIGRAM(S): 50 TABLET ORAL at 06:47

## 2019-04-01 RX ADMIN — OXYCODONE HYDROCHLORIDE 10 MILLIGRAM(S): 5 TABLET ORAL at 11:51

## 2019-04-01 RX ADMIN — OXYCODONE HYDROCHLORIDE 10 MILLIGRAM(S): 5 TABLET ORAL at 18:00

## 2019-04-01 RX ADMIN — Medication 1 GRAM(S): at 17:08

## 2019-04-01 RX ADMIN — Medication 10 UNIT(S): at 13:09

## 2019-04-01 RX ADMIN — Medication 1 SPRAY(S): at 21:23

## 2019-04-01 RX ADMIN — Medication 40 MILLIGRAM(S): at 06:47

## 2019-04-01 RX ADMIN — Medication 200 MILLIGRAM(S): at 11:50

## 2019-04-01 RX ADMIN — Medication 2: at 17:10

## 2019-04-01 RX ADMIN — GABAPENTIN 300 MILLIGRAM(S): 400 CAPSULE ORAL at 13:08

## 2019-04-01 RX ADMIN — Medication 10 UNIT(S): at 08:42

## 2019-04-01 RX ADMIN — PANTOPRAZOLE SODIUM 40 MILLIGRAM(S): 20 TABLET, DELAYED RELEASE ORAL at 17:09

## 2019-04-01 NOTE — PROGRESS NOTE ADULT - ASSESSMENT
Problem/Plan - 1:  ·  Problem: HAP (hospital-acquired pneumonia).  Plan: finished antibiotics  id and pulmonary fu  will monitor.   spoke with pulmonary , CT sinus not needed a inpt, can be done as outpt     Problem/Plan - 2:  ·  Problem: Chronic hypoxic respiratory failure , Oxygen dependent.  Plan: bipap at night   pulmonary fu.   no need for CT sinus as inpt     Problem/Plan - 3:  ·  Problem: Diabetes.  Plan: monitor FS  ISS.     Problem/Plan - 4:  ·  Problem: foot drop.  Plan: rheum fu appreciated  chronic   no inpt sherman as per neuro    stable for dc

## 2019-04-01 NOTE — PROGRESS NOTE ADULT - SUBJECTIVE AND OBJECTIVE BOX
Patient is a 63y old  Female who presents with a chief complaint of sob (01 Apr 2019 09:21)      Any change in ROS: Doingpretty good: no SOB: no cough : says her breathign has been fine:       MEDICATIONS  (STANDING):  ALBUTerol/ipratropium for Nebulization 3 milliLiter(s) Nebulizer every 6 hours  buDESOnide  80 MICROgram(s)/formoterol 4.5 MICROgram(s) Inhaler 2 Puff(s) Inhalation two times a day  dextrose 5%. 1000 milliLiter(s) (50 mL/Hr) IV Continuous <Continuous>  dextrose 50% Injectable 12.5 Gram(s) IV Push once  dextrose 50% Injectable 25 Gram(s) IV Push once  dextrose 50% Injectable 25 Gram(s) IV Push once  docusate sodium 100 milliGRAM(s) Oral three times a day  enoxaparin Injectable 40 milliGRAM(s) SubCutaneous every 24 hours  fentaNYL   Patch  25 MICROgram(s)/Hr 1 Patch Transdermal every 72 hours  furosemide    Tablet 40 milliGRAM(s) Oral two times a day  gabapentin 300 milliGRAM(s) Oral three times a day  guaiFENesin   Syrup  (Sugar-Free) 200 milliGRAM(s) Oral every 6 hours  influenza   Vaccine 0.5 milliLiter(s) IntraMuscular once  insulin glargine Injectable (LANTUS) 20 Unit(s) SubCutaneous at bedtime  insulin lispro (HumaLOG) corrective regimen sliding scale   SubCutaneous three times a day before meals  insulin lispro Injectable (HumaLOG) 10 Unit(s) SubCutaneous three times a day before meals  levothyroxine 137 MICROGram(s) Oral daily  nystatin Powder 1 Application(s) Topical two times a day  pantoprazole    Tablet 40 milliGRAM(s) Oral two times a day  polyethylene glycol 3350 17 Gram(s) Oral two times a day  predniSONE   Tablet 20 milliGRAM(s) Oral daily  primidone 50 milliGRAM(s) Oral daily  senna 2 Tablet(s) Oral at bedtime  sildenafil (REVATIO) 20 milliGRAM(s) Oral three times a day  simvastatin 20 milliGRAM(s) Oral at bedtime  sucralfate 1 Gram(s) Oral four times a day  traMADol 50 milliGRAM(s) Oral two times a day    MEDICATIONS  (PRN):  dextrose 40% Gel 15 Gram(s) Oral once PRN Blood Glucose LESS THAN 70 milliGRAM(s)/deciliter  glucagon  Injectable 1 milliGRAM(s) IntraMuscular once PRN Glucose LESS THAN 70 milligrams/deciliter  ondansetron Injectable 4 milliGRAM(s) IV Push every 6 hours PRN Nausea  oxyCODONE    IR 10 milliGRAM(s) Oral every 4 hours PRN Moderate Pain (4 - 6)  sodium chloride 0.65% Nasal 1 Spray(s) Both Nostrils every 4 hours PRN Nasal Congestion    Vital Signs Last 24 Hrs  T(C): 36.7 (01 Apr 2019 07:57), Max: 37.1 (31 Mar 2019 16:19)  T(F): 98 (01 Apr 2019 07:57), Max: 98.7 (31 Mar 2019 16:19)  HR: 91 (01 Apr 2019 07:57) (88 - 100)  BP: 119/72 (01 Apr 2019 07:57) (109/60 - 126/86)  BP(mean): --  RR: 18 (01 Apr 2019 07:57) (18 - 18)  SpO2: 100% (01 Apr 2019 07:57) (95% - 100%)    I&O's Summary    31 Mar 2019 07:01  -  01 Apr 2019 07:00  --------------------------------------------------------  IN: 250 mL / OUT: 950 mL / NET: -700 mL          Physical Exam:   GENERAL:Obese  HEENT: KWAME/   Atraumatic, Normocephalic  ENMT: No tonsillar erythema, exudates, or enlargement; Moist mucous membranes, Good dentition, No lesions  NECK: Supple, No JVD, Normal thyroid  CHEST/LUNG: Clear to auscultaion  GI: : Soft, Nontender, Nondistended; Bowel sounds present  NERVOUS SYSTEM:  Alert & Oriented X3  EXTREMITIES: tremros  LYMPH: No lymphadenopathy noted  SKIN: No rashes or lesions  ENDOCRINOLOGY: No Thyromegaly  PSYCH: Appropriate    Labs:                              10.3   10.5  )-----------( 479      ( 29 Mar 2019 09:31 )             32.4           CAPILLARY BLOOD GLUCOSE      POCT Blood Glucose.: 137 mg/dL (01 Apr 2019 08:30)  POCT Blood Glucose.: 104 mg/dL (31 Mar 2019 21:21)  POCT Blood Glucose.: 135 mg/dL (31 Mar 2019 17:12)  POCT Blood Glucose.: 186 mg/dL (31 Mar 2019 12:51)                  RECENT CULTURES:        RESPIRATORY CULTURES:          Studies  Chest X-RAY  CT SCAN Chest   Venous Dopplers: LE:   CT Abdomen  Others

## 2019-04-01 NOTE — PROGRESS NOTE ADULT - ATTENDING COMMENTS
CARDIOLOGY ATTENDING    Patient seen and examined. Agree with above. No further inpatient cardiac workup needed. D/C planning as per primary team.

## 2019-04-01 NOTE — PROGRESS NOTE ADULT - ASSESSMENT
63yof w/ HTN, DM2, COPD/CHRIS on nocturnal CPAP, discharged from [facility] after a prolonged hospitalization for SBO requiring lysis of adhesions, complicated by hypercapneic respiratory failure requiring intubation. Pt awoke with shortness of breath and chest tightness, was supposed to use CPAP last night but nursing facility did not have the machine. Pt oxygen dependent on 4L at baseline. Feeling better now with supplemental oxygen. No fevers or chills. Occasional cough.  originally admitted on 12/15 for SBO s/p ex lap with lysis of adhesions (4 retention sutures) c/b 12/30 she eviscerated 2/2 a coughing episode s/p ex lap (6 retention sutures) c/b multifocal pneumonia, developed hypercapnic respiratory failure requiring intubation 1/6, s/p bronch 1/9/18, and extubated on 1/24, course further c/b Enterococci bacteremia now readmitted to MICU for hypoxic respiratory failure 2/2 atelectasis requiring intubation 01/31. Had grown ESBL in urine, s/p ertapenem.  Extubated to high flow/ BIPAP.

## 2019-04-01 NOTE — PROGRESS NOTE ADULT - ATTENDING COMMENTS
pt seems stable from pulm point of view: finished HAP  rx: on bipap at night: She is alert and awake: She would need follow up ct chest in 6 -8 weeks time: DC Planning:  3/31: PT IS STABLE FROM PULMONARY POINT OF VIEW: FINISHED ANTIBIOTICS: TOLERATING BIPAP  4/1: no need for  sinuses ct inpt: Her breathing has been good: No wheezing: cont bd: As wellas bipap at night!

## 2019-04-01 NOTE — PROGRESS NOTE ADULT - SUBJECTIVE AND OBJECTIVE BOX
Patient is a 63y old  Female who presents with a chief complaint of sob (01 Apr 2019 10:19)      INTERVAL HPI/OVERNIGHT EVENTS:  T(C): 37.1 (04-01-19 @ 16:22), Max: 37.1 (03-31-19 @ 23:41)  HR: 97 (04-01-19 @ 16:22) (88 - 98)  BP: 111/78 (04-01-19 @ 16:22) (111/78 - 126/86)  RR: 18 (04-01-19 @ 16:22) (18 - 18)  SpO2: 100% (04-01-19 @ 16:22) (95% - 100%)  Wt(kg): --  I&O's Summary    31 Mar 2019 07:01  -  01 Apr 2019 07:00  --------------------------------------------------------  IN: 250 mL / OUT: 950 mL / NET: -700 mL    01 Apr 2019 07:01  -  01 Apr 2019 17:58  --------------------------------------------------------  IN: 220 mL / OUT: 0 mL / NET: 220 mL        LABS:              CAPILLARY BLOOD GLUCOSE      POCT Blood Glucose.: 222 mg/dL (01 Apr 2019 16:54)  POCT Blood Glucose.: 170 mg/dL (01 Apr 2019 12:29)  POCT Blood Glucose.: 137 mg/dL (01 Apr 2019 08:30)  POCT Blood Glucose.: 104 mg/dL (31 Mar 2019 21:21)            MEDICATIONS  (STANDING):  ALBUTerol/ipratropium for Nebulization 3 milliLiter(s) Nebulizer every 6 hours  buDESOnide  80 MICROgram(s)/formoterol 4.5 MICROgram(s) Inhaler 2 Puff(s) Inhalation two times a day  dextrose 5%. 1000 milliLiter(s) (50 mL/Hr) IV Continuous <Continuous>  dextrose 50% Injectable 12.5 Gram(s) IV Push once  dextrose 50% Injectable 25 Gram(s) IV Push once  dextrose 50% Injectable 25 Gram(s) IV Push once  docusate sodium 100 milliGRAM(s) Oral three times a day  enoxaparin Injectable 40 milliGRAM(s) SubCutaneous every 24 hours  fentaNYL   Patch  25 MICROgram(s)/Hr 1 Patch Transdermal every 72 hours  furosemide    Tablet 40 milliGRAM(s) Oral two times a day  gabapentin 300 milliGRAM(s) Oral three times a day  guaiFENesin   Syrup  (Sugar-Free) 200 milliGRAM(s) Oral every 6 hours  influenza   Vaccine 0.5 milliLiter(s) IntraMuscular once  insulin glargine Injectable (LANTUS) 20 Unit(s) SubCutaneous at bedtime  insulin lispro (HumaLOG) corrective regimen sliding scale   SubCutaneous three times a day before meals  insulin lispro Injectable (HumaLOG) 10 Unit(s) SubCutaneous three times a day before meals  levothyroxine 137 MICROGram(s) Oral daily  nystatin Powder 1 Application(s) Topical two times a day  pantoprazole    Tablet 40 milliGRAM(s) Oral two times a day  polyethylene glycol 3350 17 Gram(s) Oral two times a day  predniSONE   Tablet 20 milliGRAM(s) Oral daily  primidone 50 milliGRAM(s) Oral daily  senna 2 Tablet(s) Oral at bedtime  sildenafil (REVATIO) 20 milliGRAM(s) Oral three times a day  simvastatin 20 milliGRAM(s) Oral at bedtime  sucralfate 1 Gram(s) Oral four times a day  traMADol 50 milliGRAM(s) Oral two times a day    MEDICATIONS  (PRN):  dextrose 40% Gel 15 Gram(s) Oral once PRN Blood Glucose LESS THAN 70 milliGRAM(s)/deciliter  glucagon  Injectable 1 milliGRAM(s) IntraMuscular once PRN Glucose LESS THAN 70 milligrams/deciliter  ondansetron Injectable 4 milliGRAM(s) IV Push every 6 hours PRN Nausea  oxyCODONE    IR 10 milliGRAM(s) Oral every 4 hours PRN Moderate Pain (4 - 6)  sodium chloride 0.65% Nasal 1 Spray(s) Both Nostrils every 4 hours PRN Nasal Congestion          PHYSICAL EXAM:  GENERAL: NAD, well-groomed, well-developed  HEAD:  Atraumatic, Normocephalic  CHEST/LUNG: Clear to percussion bilaterally; No rales, rhonchi, wheezing, or rubs  HEART: Regular rate and rhythm; No murmurs, rubs, or gallops  ABDOMEN: Soft, Nontender, Nondistended; Bowel sounds present  EXTREMITIES: R foot drop    Care Discussed with Consultants/Other Providers [ ] YES  [ ] NO

## 2019-04-01 NOTE — PROGRESS NOTE ADULT - SUBJECTIVE AND OBJECTIVE BOX
Interval Events: No new overnight event.  No N/V/D.  Tolerating diet.  epigastric pain is improved  skin irritation around RLQ that is bothering her     MEDICATIONS  (STANDING):  ALBUTerol/ipratropium for Nebulization 3 milliLiter(s) Nebulizer every 6 hours  buDESOnide  80 MICROgram(s)/formoterol 4.5 MICROgram(s) Inhaler 2 Puff(s) Inhalation two times a day  dextrose 5%. 1000 milliLiter(s) (50 mL/Hr) IV Continuous <Continuous>  dextrose 50% Injectable 12.5 Gram(s) IV Push once  dextrose 50% Injectable 25 Gram(s) IV Push once  dextrose 50% Injectable 25 Gram(s) IV Push once  docusate sodium 100 milliGRAM(s) Oral three times a day  enoxaparin Injectable 40 milliGRAM(s) SubCutaneous every 24 hours  fentaNYL   Patch  25 MICROgram(s)/Hr 1 Patch Transdermal every 72 hours  furosemide    Tablet 40 milliGRAM(s) Oral two times a day  gabapentin 300 milliGRAM(s) Oral three times a day  guaiFENesin   Syrup  (Sugar-Free) 200 milliGRAM(s) Oral every 6 hours  influenza   Vaccine 0.5 milliLiter(s) IntraMuscular once  insulin glargine Injectable (LANTUS) 20 Unit(s) SubCutaneous at bedtime  insulin lispro (HumaLOG) corrective regimen sliding scale   SubCutaneous three times a day before meals  insulin lispro Injectable (HumaLOG) 10 Unit(s) SubCutaneous three times a day before meals  levothyroxine 137 MICROGram(s) Oral daily  pantoprazole    Tablet 40 milliGRAM(s) Oral two times a day  polyethylene glycol 3350 17 Gram(s) Oral two times a day  predniSONE   Tablet 20 milliGRAM(s) Oral daily  primidone 50 milliGRAM(s) Oral daily  senna 2 Tablet(s) Oral at bedtime  sildenafil (REVATIO) 20 milliGRAM(s) Oral three times a day  simvastatin 20 milliGRAM(s) Oral at bedtime  sucralfate 1 Gram(s) Oral four times a day  traMADol 50 milliGRAM(s) Oral two times a day    MEDICATIONS  (PRN):  dextrose 40% Gel 15 Gram(s) Oral once PRN Blood Glucose LESS THAN 70 milliGRAM(s)/deciliter  glucagon  Injectable 1 milliGRAM(s) IntraMuscular once PRN Glucose LESS THAN 70 milligrams/deciliter  ondansetron Injectable 4 milliGRAM(s) IV Push every 6 hours PRN Nausea  oxyCODONE    IR 10 milliGRAM(s) Oral every 4 hours PRN Moderate Pain (4 - 6)  sodium chloride 0.65% Nasal 1 Spray(s) Both Nostrils every 4 hours PRN Nasal Congestion      Allergies    No Known Allergies    Intolerances    Seroquel (Other)      Review of Systems:    General:  No wt loss, fevers, chills, night sweats,fatigue,   Eyes:  Good vision, no reported pain  ENT:  No sore throat, pain, runny nose, dysphagia  CV:  No pain, palpitations, hypo/hypertension  Resp:  No dyspnea, cough, tachypnea, wheezing  GI:  No pain, No nausea, No vomiting, No diarrhea, No constipation, No weight loss, No fever, No pruritis, No rectal bleeding, No melena, No dysphagia  :  No pain, bleeding, incontinence, nocturia  Muscle:  No pain, weakness  Neuro:  No weakness, tingling, memory problems  Psych:  No fatigue, insomnia, mood problems, depression  Endocrine:  No polyuria, polydypsia, cold/heat intolerance  Heme:  No petechiae, ecchymosis, easy bruisability  Skin:  No rash, tattoos, scars, edema      Vital Signs Last 24 Hrs  T(C): 36.7 (01 Apr 2019 07:57), Max: 37.1 (31 Mar 2019 16:19)  T(F): 98 (01 Apr 2019 07:57), Max: 98.7 (31 Mar 2019 16:19)  HR: 91 (01 Apr 2019 07:57) (88 - 100)  BP: 119/72 (01 Apr 2019 07:57) (109/60 - 126/86)  BP(mean): --  RR: 18 (01 Apr 2019 07:57) (18 - 18)  SpO2: 100% (01 Apr 2019 07:57) (95% - 100%)    PHYSICAL EXAM:    GENERAL:  Appears stated age, well-groomed, well-nourished, no distress  HEENT:  NC/AT,  conjunctivae clear and pink, no thyromegaly, nodules, adenopathy, no JVD, sclera -anicteric  CHEST:  Full & symmetric excursion, no increased effort, breath sounds clear  HEART:  Regular rhythm, S1, S2, no murmur/rub/S3/S4, no abdominal bruit, no edema  ABDOMEN:  Soft, non-tender, non-distended,+ abdominal incision dressed-- c/d/i   EXTEREMITIES:  no cyanosis,clubbing or edema  SKIN:  No rash/erythema/ecchymoses/petechiae/wounds/abscess/warm/dry  NEURO:  Alert, oriented, no asterixis, no tremor, no encephalopathy      LABS:                RADIOLOGY & ADDITIONAL TESTS:

## 2019-04-01 NOTE — PROGRESS NOTE ADULT - ASSESSMENT
63 year old female with HTN, DM2, COPD/CHRIS on nocturnal CPAP, discharged 3/20 from [facility] after a prolonged hospitalization for SBO requiring lysis of adhesions, complicated by hypercapnic respiratory failure requiring intubation. GI consulted for anemia.

## 2019-04-01 NOTE — PROGRESS NOTE ADULT - SUBJECTIVE AND OBJECTIVE BOX
pt seen and examined,  no  chest pain , ROS     ALBUTerol/ipratropium for Nebulization 3 milliLiter(s) Nebulizer every 6 hours  buDESOnide  80 MICROgram(s)/formoterol 4.5 MICROgram(s) Inhaler 2 Puff(s) Inhalation two times a day  dextrose 40% Gel 15 Gram(s) Oral once PRN  dextrose 5%. 1000 milliLiter(s) IV Continuous <Continuous>  dextrose 50% Injectable 12.5 Gram(s) IV Push once  dextrose 50% Injectable 25 Gram(s) IV Push once  dextrose 50% Injectable 25 Gram(s) IV Push once  docusate sodium 100 milliGRAM(s) Oral three times a day  enoxaparin Injectable 40 milliGRAM(s) SubCutaneous every 24 hours  fentaNYL   Patch  25 MICROgram(s)/Hr 1 Patch Transdermal every 72 hours  furosemide    Tablet 40 milliGRAM(s) Oral two times a day  gabapentin 300 milliGRAM(s) Oral three times a day  glucagon  Injectable 1 milliGRAM(s) IntraMuscular once PRN  guaiFENesin   Syrup  (Sugar-Free) 200 milliGRAM(s) Oral every 6 hours  influenza   Vaccine 0.5 milliLiter(s) IntraMuscular once  insulin glargine Injectable (LANTUS) 20 Unit(s) SubCutaneous at bedtime  insulin lispro (HumaLOG) corrective regimen sliding scale   SubCutaneous three times a day before meals  insulin lispro Injectable (HumaLOG) 10 Unit(s) SubCutaneous three times a day before meals  levothyroxine 137 MICROGram(s) Oral daily  ondansetron Injectable 4 milliGRAM(s) IV Push every 6 hours PRN  oxyCODONE    IR 10 milliGRAM(s) Oral every 4 hours PRN  pantoprazole    Tablet 40 milliGRAM(s) Oral two times a day  polyethylene glycol 3350 17 Gram(s) Oral two times a day  predniSONE   Tablet 20 milliGRAM(s) Oral daily  primidone 50 milliGRAM(s) Oral daily  senna 2 Tablet(s) Oral at bedtime  sildenafil (REVATIO) 20 milliGRAM(s) Oral three times a day  simvastatin 20 milliGRAM(s) Oral at bedtime  sodium chloride 0.65% Nasal 1 Spray(s) Both Nostrils every 4 hours PRN  sucralfate 1 Gram(s) Oral four times a day  traMADol 50 milliGRAM(s) Oral two times a day          Hemoglobin: 10.3 g/dL (03-29 @ 09:31)  Hemoglobin: 9.3 g/dL (03-27 @ 09:56)            Creatinine Trend: 0.71<--, 0.76<--, 0.86<--, 0.97<--, 1.00<--, 1.02<--    COAGS:           T(C): 37.1 (03-31-19 @ 23:41), Max: 37.1 (03-31-19 @ 16:19)  HR: 88 (04-01-19 @ 00:45) (88 - 106)  BP: 126/86 (03-31-19 @ 23:41) (109/60 - 141/83)  RR: 18 (03-31-19 @ 23:41) (18 - 18)  SpO2: 95% (04-01-19 @ 00:45) (95% - 97%)  Wt(kg): --    I&O's Summary    30 Mar 2019 07:01  -  31 Mar 2019 07:00  --------------------------------------------------------  IN: 250 mL / OUT: 200 mL / NET: 50 mL    31 Mar 2019 07:01  -  01 Apr 2019 06:48  --------------------------------------------------------  IN: 250 mL / OUT: 950 mL / NET: -700 mL        Gen: Appears well in NAD  HEENT:  (-)icterus (-)pallor  CV: N S1 S2 1/6 OJ (+)2 Pulses B/l  Resp:  Clear to ausculatation B/L, normal effort  GI: (+) BS Soft, NT, ND  Lymph:  (-)Edema, (-)obvious lymphadenopathy  Skin: Warm to touch, Normal turgor  Psych: Appropriate mood and affect      ECG:  	sinus Tachycardia 109 BPM, nonspecific T wave abnormality    RADIOLOGY:         CXR:  < from: Xray Chest 1 View- PORTABLE-Urgent (03.21.19 @ 11:18) >  The heart is enlarged. Improvingopacities in both lungs when compared to   previous study done on March 6, 2019. Those changes are compatible with   interstitial fluid however an infectious process cannot be ruled out   entirely. Severe degenerative changes of the right shoulder. Degenerative   changes of the thoracic spine is evident as well.    < end of copied text >      ECHO: pending < from: Transthoracic Echocardiogram (03.26.19 @ 10:29) >  Conclusions:  1. Increased relative wall thickness with normal left  ventricular mass index, consistent with concentric left  ventricular remodeling.  2. Hyperdynamic left ventricular systolic function.  3. Estimated pulmonary artery systolic pressure equals 42  mm Hg, assuming right atrial pressureequals 8 mm Hg,  consistent with mild pulmonary pressures.    < end of copied text >      ASSESSMENT/PLAN: 	63y Female  HTN, DM2, COPD/CHRIS on nocturnal CPAP, normal LV function un echo 1/19 moderate pulmonary HTN discharged yesterday from [facility] after a prolonged hospitalization for SBO requiring lysis of adhesions, complicated by hypercapneic respiratory failure requiring intubation now being treated for HCAP..    - keep net neg with daily lasix   - 3/26 ECHO ,   hyperdynamic LV fx   - no further CV work up needed at present  -  cont All meds for pulm htn    -  GI / DVT prophylaxis,  keep K>4, mag >2.0  - D/C planning   D/W Dr Joseph

## 2019-04-02 LAB
CCP IGG SERPL-ACNC: 198 UNITS — HIGH (ref 0–19)
GLUCOSE BLDC GLUCOMTR-MCNC: 111 MG/DL — HIGH (ref 70–99)
GLUCOSE BLDC GLUCOMTR-MCNC: 131 MG/DL — HIGH (ref 70–99)
GLUCOSE BLDC GLUCOMTR-MCNC: 137 MG/DL — HIGH (ref 70–99)
GLUCOSE BLDC GLUCOMTR-MCNC: 187 MG/DL — HIGH (ref 70–99)
RF+CCP IGG SER-IMP: ABNORMAL

## 2019-04-02 PROCEDURE — 99231 SBSQ HOSP IP/OBS SF/LOW 25: CPT

## 2019-04-02 PROCEDURE — 99232 SBSQ HOSP IP/OBS MODERATE 35: CPT | Mod: GC

## 2019-04-02 PROCEDURE — 99232 SBSQ HOSP IP/OBS MODERATE 35: CPT

## 2019-04-02 PROCEDURE — 70486 CT MAXILLOFACIAL W/O DYE: CPT | Mod: 26

## 2019-04-02 RX ADMIN — Medication 1 GRAM(S): at 05:48

## 2019-04-02 RX ADMIN — Medication 1 GRAM(S): at 21:27

## 2019-04-02 RX ADMIN — OXYCODONE HYDROCHLORIDE 10 MILLIGRAM(S): 5 TABLET ORAL at 22:30

## 2019-04-02 RX ADMIN — Medication 40 MILLIGRAM(S): at 05:49

## 2019-04-02 RX ADMIN — OXYCODONE HYDROCHLORIDE 10 MILLIGRAM(S): 5 TABLET ORAL at 07:01

## 2019-04-02 RX ADMIN — Medication 3 MILLILITER(S): at 12:42

## 2019-04-02 RX ADMIN — Medication 20 MILLIGRAM(S): at 13:06

## 2019-04-02 RX ADMIN — Medication 10 UNIT(S): at 18:01

## 2019-04-02 RX ADMIN — Medication 100 MILLIGRAM(S): at 21:27

## 2019-04-02 RX ADMIN — FENTANYL CITRATE 1 PATCH: 50 INJECTION INTRAVENOUS at 12:39

## 2019-04-02 RX ADMIN — Medication 200 MILLIGRAM(S): at 17:57

## 2019-04-02 RX ADMIN — OXYCODONE HYDROCHLORIDE 10 MILLIGRAM(S): 5 TABLET ORAL at 04:00

## 2019-04-02 RX ADMIN — SIMVASTATIN 20 MILLIGRAM(S): 20 TABLET, FILM COATED ORAL at 21:28

## 2019-04-02 RX ADMIN — Medication 1 GRAM(S): at 12:42

## 2019-04-02 RX ADMIN — Medication 1 GRAM(S): at 00:37

## 2019-04-02 RX ADMIN — OXYCODONE HYDROCHLORIDE 5 MILLIGRAM(S): 5 TABLET ORAL at 00:30

## 2019-04-02 RX ADMIN — Medication 3 MILLILITER(S): at 17:57

## 2019-04-02 RX ADMIN — TRAMADOL HYDROCHLORIDE 50 MILLIGRAM(S): 50 TABLET ORAL at 17:58

## 2019-04-02 RX ADMIN — Medication 200 MILLIGRAM(S): at 12:42

## 2019-04-02 RX ADMIN — FENTANYL CITRATE 1 PATCH: 50 INJECTION INTRAVENOUS at 23:31

## 2019-04-02 RX ADMIN — Medication 20 MILLIGRAM(S): at 05:48

## 2019-04-02 RX ADMIN — Medication 10 UNIT(S): at 08:56

## 2019-04-02 RX ADMIN — OXYCODONE HYDROCHLORIDE 10 MILLIGRAM(S): 5 TABLET ORAL at 11:26

## 2019-04-02 RX ADMIN — Medication 200 MILLIGRAM(S): at 05:49

## 2019-04-02 RX ADMIN — Medication 10 UNIT(S): at 12:42

## 2019-04-02 RX ADMIN — Medication 3 MILLILITER(S): at 05:48

## 2019-04-02 RX ADMIN — OXYCODONE HYDROCHLORIDE 10 MILLIGRAM(S): 5 TABLET ORAL at 08:35

## 2019-04-02 RX ADMIN — Medication 3 MILLILITER(S): at 21:26

## 2019-04-02 RX ADMIN — NYSTATIN CREAM 1 APPLICATION(S): 100000 CREAM TOPICAL at 05:50

## 2019-04-02 RX ADMIN — SENNA PLUS 2 TABLET(S): 8.6 TABLET ORAL at 21:27

## 2019-04-02 RX ADMIN — NYSTATIN CREAM 1 APPLICATION(S): 100000 CREAM TOPICAL at 17:53

## 2019-04-02 RX ADMIN — GABAPENTIN 300 MILLIGRAM(S): 400 CAPSULE ORAL at 13:06

## 2019-04-02 RX ADMIN — PANTOPRAZOLE SODIUM 40 MILLIGRAM(S): 20 TABLET, DELAYED RELEASE ORAL at 17:59

## 2019-04-02 RX ADMIN — PANTOPRAZOLE SODIUM 40 MILLIGRAM(S): 20 TABLET, DELAYED RELEASE ORAL at 05:51

## 2019-04-02 RX ADMIN — Medication 137 MICROGRAM(S): at 05:49

## 2019-04-02 RX ADMIN — PRIMIDONE 50 MILLIGRAM(S): 250 TABLET ORAL at 12:42

## 2019-04-02 RX ADMIN — Medication 20 MILLIGRAM(S): at 05:49

## 2019-04-02 RX ADMIN — Medication 100 MILLIGRAM(S): at 13:06

## 2019-04-02 RX ADMIN — OXYCODONE HYDROCHLORIDE 10 MILLIGRAM(S): 5 TABLET ORAL at 21:27

## 2019-04-02 RX ADMIN — FENTANYL CITRATE 1 PATCH: 50 INJECTION INTRAVENOUS at 21:12

## 2019-04-02 RX ADMIN — Medication 20 MILLIGRAM(S): at 21:28

## 2019-04-02 RX ADMIN — BUDESONIDE AND FORMOTEROL FUMARATE DIHYDRATE 2 PUFF(S): 160; 4.5 AEROSOL RESPIRATORY (INHALATION) at 05:48

## 2019-04-02 RX ADMIN — GABAPENTIN 300 MILLIGRAM(S): 400 CAPSULE ORAL at 05:49

## 2019-04-02 RX ADMIN — OXYCODONE HYDROCHLORIDE 10 MILLIGRAM(S): 5 TABLET ORAL at 16:08

## 2019-04-02 RX ADMIN — INSULIN GLARGINE 20 UNIT(S): 100 INJECTION, SOLUTION SUBCUTANEOUS at 21:51

## 2019-04-02 RX ADMIN — Medication 200 MILLIGRAM(S): at 00:34

## 2019-04-02 RX ADMIN — TRAMADOL HYDROCHLORIDE 50 MILLIGRAM(S): 50 TABLET ORAL at 05:49

## 2019-04-02 RX ADMIN — OXYCODONE HYDROCHLORIDE 10 MILLIGRAM(S): 5 TABLET ORAL at 16:50

## 2019-04-02 RX ADMIN — OXYCODONE HYDROCHLORIDE 10 MILLIGRAM(S): 5 TABLET ORAL at 03:01

## 2019-04-02 RX ADMIN — Medication 200 MILLIGRAM(S): at 21:28

## 2019-04-02 RX ADMIN — Medication 1: at 12:43

## 2019-04-02 RX ADMIN — GABAPENTIN 300 MILLIGRAM(S): 400 CAPSULE ORAL at 21:28

## 2019-04-02 RX ADMIN — BUDESONIDE AND FORMOTEROL FUMARATE DIHYDRATE 2 PUFF(S): 160; 4.5 AEROSOL RESPIRATORY (INHALATION) at 17:57

## 2019-04-02 RX ADMIN — Medication 1 GRAM(S): at 17:58

## 2019-04-02 RX ADMIN — OXYCODONE HYDROCHLORIDE 10 MILLIGRAM(S): 5 TABLET ORAL at 12:30

## 2019-04-02 RX ADMIN — Medication 40 MILLIGRAM(S): at 17:59

## 2019-04-02 RX ADMIN — FENTANYL CITRATE 1 PATCH: 50 INJECTION INTRAVENOUS at 23:00

## 2019-04-02 RX ADMIN — Medication 100 MILLIGRAM(S): at 05:49

## 2019-04-02 NOTE — CHART NOTE - NSCHARTNOTEFT_GEN_A_CORE
Nutrition Follow Up Note    Pt seen for malnutrition follow up. Pt is a 63 year old female with PMH of HTN, type 2 DM, COPD on nocturnal CPAP, recent extended hospitalization 12/15/18-3/20/19 for SBO course complicated by respiratory failure requiring intubation now admitted with shortness of breath and chest tightness found to have pneumonia. S/p antibiotics course for pneumonia. Pt is on bipap at night. Rheumatology following for foot drop. Discharge appeal pending    Source: comprehensive chart review, patient     Diet : Consistent Carbohydrate, DASH    Patient reports good appetite and intake. Denies nausea/vomiting, diarrhea/constipation; endorses some stomach pain which she states "may be a hernia". Per chart, last BM yesterday . Per last RD note, RD recommended pt receive Glucerna twice daily; pt not currently receiving, however reports she no longer wishes receive as her appetite is adequate. Nursing fowsheets confirm patient with % intake of meals. Pt continues to defer nutrition education as she follows up with RD at her PMD/endocrinologist, however agreeable to written education on Heart Healthy Consistent Carbohydrate diet. Assisted patient with menu ordering.     PO intake : %     Source for PO intake: patient, flowsheets     Enteral /Parenteral Nutrition: n/a    Daily Weight in k.2 (03-27), Weight in k (03-27)  % Weight Change - no new weight to assess     Pertinent Medications: MEDICATIONS  (STANDING):  ALBUTerol/ipratropium for Nebulization 3 milliLiter(s) Nebulizer every 6 hours  buDESOnide  80 MICROgram(s)/formoterol 4.5 MICROgram(s) Inhaler 2 Puff(s) Inhalation two times a day  dextrose 5%. 1000 milliLiter(s) (50 mL/Hr) IV Continuous <Continuous>  dextrose 50% Injectable 12.5 Gram(s) IV Push once  dextrose 50% Injectable 25 Gram(s) IV Push once  dextrose 50% Injectable 25 Gram(s) IV Push once  docusate sodium 100 milliGRAM(s) Oral three times a day  enoxaparin Injectable 40 milliGRAM(s) SubCutaneous every 24 hours  fentaNYL   Patch  25 MICROgram(s)/Hr 1 Patch Transdermal every 72 hours  furosemide    Tablet 40 milliGRAM(s) Oral two times a day  gabapentin 300 milliGRAM(s) Oral three times a day  guaiFENesin   Syrup  (Sugar-Free) 200 milliGRAM(s) Oral every 6 hours  influenza   Vaccine 0.5 milliLiter(s) IntraMuscular once  insulin glargine Injectable (LANTUS) 20 Unit(s) SubCutaneous at bedtime  insulin lispro (HumaLOG) corrective regimen sliding scale   SubCutaneous three times a day before meals  insulin lispro Injectable (HumaLOG) 10 Unit(s) SubCutaneous three times a day before meals  levothyroxine 137 MICROGram(s) Oral daily  nystatin Powder 1 Application(s) Topical two times a day  pantoprazole    Tablet 40 milliGRAM(s) Oral two times a day  polyethylene glycol 3350 17 Gram(s) Oral two times a day  predniSONE   Tablet 20 milliGRAM(s) Oral daily  primidone 50 milliGRAM(s) Oral daily  senna 2 Tablet(s) Oral at bedtime  sildenafil (REVATIO) 20 milliGRAM(s) Oral three times a day  simvastatin 20 milliGRAM(s) Oral at bedtime  sucralfate 1 Gram(s) Oral four times a day  traMADol 50 milliGRAM(s) Oral two times a day    MEDICATIONS  (PRN):  dextrose 40% Gel 15 Gram(s) Oral once PRN Blood Glucose LESS THAN 70 milliGRAM(s)/deciliter  glucagon  Injectable 1 milliGRAM(s) IntraMuscular once PRN Glucose LESS THAN 70 milligrams/deciliter  ondansetron Injectable 4 milliGRAM(s) IV Push every 6 hours PRN Nausea  oxyCODONE    IR 10 milliGRAM(s) Oral every 4 hours PRN Moderate Pain (4 - 6)  sodium chloride 0.65% Nasal 1 Spray(s) Both Nostrils every 4 hours PRN Nasal Congestion    Pertinent Labs:   Finger Sticks:  POCT Blood Glucose.: 137 mg/dL ( @ 08:20)  POCT Blood Glucose.: 115 mg/dL ( @ 21:58)  POCT Blood Glucose.: 222 mg/dL ( @ 16:54)  POCT Blood Glucose.: 170 mg/dL ( @ 12:29)      Skin per nursing documentation: no pressure injuries  Edema: 3+ generalized per nursing documentation    Estimated Needs:   [x ] no change since previous assessment  [ ] recalculated:     Previous Nutrition Diagnosis: Mild Malnutrition  Nutrition Diagnosis is: ongoing, addressed with po diet    New Nutrition Diagnosis: n/a       Interventions:   Recommend  1) Continue current diet order of regular texture, Consistent Carbohydrate, DASH/TLC  2) Obtain/honor food preferences within diet restrictions, assist with menu ordering   3) Encourage po intake at meal times    Monitoring and Evaluation:     Continue to monitor Nutritional intake, Tolerance to diet prescription, weights, labs, skin integrity    RD remains available upon request and will follow up per protocol    Lana Engle, Dietetic Intern  Pager #[phone] Nutrition Follow Up Note    Pt seen for malnutrition follow up. Pt is a 63 year old female with PMH of HTN, type 2 DM, COPD on nocturnal CPAP, recent extended hospitalization 12/15/18-3/20/19 for SBO course complicated by respiratory failure requiring intubation now admitted with shortness of breath and chest tightness found to have pneumonia. S/p antibiotics course for pneumonia. Pt is on bipap at night. Rheumatology following for foot drop    Source: comprehensive chart review, patient     Diet : Consistent Carbohydrate, DASH    Patient reports good appetite and intake. Denies nausea/vomiting, diarrhea/constipation; endorses some stomach pain which she states "may be a hernia". Per chart, last BM yesterday . Per last RD note, RD recommended pt receive Glucerna twice daily; pt not currently receiving, however reports she no longer wishes receive as her appetite is adequate. Nursing fowsheets confirm patient with % intake of meals. Pt continues to defer nutrition education as she follows up with RD at her PMD/endocrinologist, however agreeable to written education on Heart Healthy Consistent Carbohydrate diet. Assisted patient with menu ordering.     PO intake : %     Source for PO intake: patient, flowsheets     Enteral /Parenteral Nutrition: n/a    Daily Weight in k.2 (03-27), Weight in k (03-27)  % Weight Change - no new weight to assess     Pertinent Medications: MEDICATIONS  (STANDING):  ALBUTerol/ipratropium for Nebulization 3 milliLiter(s) Nebulizer every 6 hours  buDESOnide  80 MICROgram(s)/formoterol 4.5 MICROgram(s) Inhaler 2 Puff(s) Inhalation two times a day  dextrose 5%. 1000 milliLiter(s) (50 mL/Hr) IV Continuous <Continuous>  dextrose 50% Injectable 12.5 Gram(s) IV Push once  dextrose 50% Injectable 25 Gram(s) IV Push once  dextrose 50% Injectable 25 Gram(s) IV Push once  docusate sodium 100 milliGRAM(s) Oral three times a day  enoxaparin Injectable 40 milliGRAM(s) SubCutaneous every 24 hours  fentaNYL   Patch  25 MICROgram(s)/Hr 1 Patch Transdermal every 72 hours  furosemide    Tablet 40 milliGRAM(s) Oral two times a day  gabapentin 300 milliGRAM(s) Oral three times a day  guaiFENesin   Syrup  (Sugar-Free) 200 milliGRAM(s) Oral every 6 hours  influenza   Vaccine 0.5 milliLiter(s) IntraMuscular once  insulin glargine Injectable (LANTUS) 20 Unit(s) SubCutaneous at bedtime  insulin lispro (HumaLOG) corrective regimen sliding scale   SubCutaneous three times a day before meals  insulin lispro Injectable (HumaLOG) 10 Unit(s) SubCutaneous three times a day before meals  levothyroxine 137 MICROGram(s) Oral daily  nystatin Powder 1 Application(s) Topical two times a day  pantoprazole    Tablet 40 milliGRAM(s) Oral two times a day  polyethylene glycol 3350 17 Gram(s) Oral two times a day  predniSONE   Tablet 20 milliGRAM(s) Oral daily  primidone 50 milliGRAM(s) Oral daily  senna 2 Tablet(s) Oral at bedtime  sildenafil (REVATIO) 20 milliGRAM(s) Oral three times a day  simvastatin 20 milliGRAM(s) Oral at bedtime  sucralfate 1 Gram(s) Oral four times a day  traMADol 50 milliGRAM(s) Oral two times a day    MEDICATIONS  (PRN):  dextrose 40% Gel 15 Gram(s) Oral once PRN Blood Glucose LESS THAN 70 milliGRAM(s)/deciliter  glucagon  Injectable 1 milliGRAM(s) IntraMuscular once PRN Glucose LESS THAN 70 milligrams/deciliter  ondansetron Injectable 4 milliGRAM(s) IV Push every 6 hours PRN Nausea  oxyCODONE    IR 10 milliGRAM(s) Oral every 4 hours PRN Moderate Pain (4 - 6)  sodium chloride 0.65% Nasal 1 Spray(s) Both Nostrils every 4 hours PRN Nasal Congestion    Pertinent Labs:   Finger Sticks:  POCT Blood Glucose.: 137 mg/dL ( @ 08:20)  POCT Blood Glucose.: 115 mg/dL ( @ 21:58)  POCT Blood Glucose.: 222 mg/dL ( @ 16:54)  POCT Blood Glucose.: 170 mg/dL ( @ 12:29)      Skin per nursing documentation: no pressure injuries  Edema: 3+ generalized per nursing documentation    Estimated Needs:   [x ] no change since previous assessment  [ ] recalculated:     Previous Nutrition Diagnosis: Mild Malnutrition  Nutrition Diagnosis is: ongoing, addressed with po diet    New Nutrition Diagnosis: n/a       Interventions:   Recommend  1) Continue current diet order of regular texture, Consistent Carbohydrate, DASH/TLC  2) Obtain/honor food preferences within diet restrictions, assist with menu ordering   3) Encourage po intake at meal times    Monitoring and Evaluation:     Continue to monitor Nutritional intake, Tolerance to diet prescription, weights, labs, skin integrity    RD remains available upon request and will follow up per protocol    Lana Engle, Dietetic Intern  Pager #[phone] Nutrition Follow Up Note    Pt seen for malnutrition follow up. Pt is a 63 year old female with PMH of HTN, type 2 DM, COPD on nocturnal CPAP, recent extended hospitalization 12/15/18-3/20/19 for SBO course complicated by respiratory failure requiring intubation now admitted with shortness of breath and chest tightness found to have pneumonia. S/p antibiotics course for pneumonia. Pt is on bipap at night. Rheumatology following for joint pain; neurology for foot drop    Source: comprehensive chart review, patient     Diet : Consistent Carbohydrate, DASH    Patient reports good appetite and intake. Denies nausea/vomiting, diarrhea/constipation; endorses some stomach pain which she states "may be a hernia". Per chart, last BM yesterday . Per last RD note, RD recommended pt receive Glucerna twice daily; pt not currently receiving, however reports she no longer wishes receive as her appetite is adequate. Nursing flowsheets confirm patient with % intake of meals. Pt continues to defer nutrition education as she follows up with RD at her PMD/endocrinologist, however agreeable to written education on Heart Healthy Consistent Carbohydrate diet. Assisted patient with menu ordering.     PO intake : %     Source for PO intake: patient, flowsheets     Enteral /Parenteral Nutrition: n/a    Daily Weight in k.2 (03-27), Weight in k (03-27)  % Weight Change - no new weight to assess     Pertinent Medications: MEDICATIONS  (STANDING):  ALBUTerol/ipratropium for Nebulization 3 milliLiter(s) Nebulizer every 6 hours  buDESOnide  80 MICROgram(s)/formoterol 4.5 MICROgram(s) Inhaler 2 Puff(s) Inhalation two times a day  dextrose 5%. 1000 milliLiter(s) (50 mL/Hr) IV Continuous <Continuous>  dextrose 50% Injectable 12.5 Gram(s) IV Push once  dextrose 50% Injectable 25 Gram(s) IV Push once  dextrose 50% Injectable 25 Gram(s) IV Push once  docusate sodium 100 milliGRAM(s) Oral three times a day  enoxaparin Injectable 40 milliGRAM(s) SubCutaneous every 24 hours  fentaNYL   Patch  25 MICROgram(s)/Hr 1 Patch Transdermal every 72 hours  furosemide    Tablet 40 milliGRAM(s) Oral two times a day  gabapentin 300 milliGRAM(s) Oral three times a day  guaiFENesin   Syrup  (Sugar-Free) 200 milliGRAM(s) Oral every 6 hours  influenza   Vaccine 0.5 milliLiter(s) IntraMuscular once  insulin glargine Injectable (LANTUS) 20 Unit(s) SubCutaneous at bedtime  insulin lispro (HumaLOG) corrective regimen sliding scale   SubCutaneous three times a day before meals  insulin lispro Injectable (HumaLOG) 10 Unit(s) SubCutaneous three times a day before meals  levothyroxine 137 MICROGram(s) Oral daily  nystatin Powder 1 Application(s) Topical two times a day  pantoprazole    Tablet 40 milliGRAM(s) Oral two times a day  polyethylene glycol 3350 17 Gram(s) Oral two times a day  predniSONE   Tablet 20 milliGRAM(s) Oral daily  primidone 50 milliGRAM(s) Oral daily  senna 2 Tablet(s) Oral at bedtime  sildenafil (REVATIO) 20 milliGRAM(s) Oral three times a day  simvastatin 20 milliGRAM(s) Oral at bedtime  sucralfate 1 Gram(s) Oral four times a day  traMADol 50 milliGRAM(s) Oral two times a day    MEDICATIONS  (PRN):  dextrose 40% Gel 15 Gram(s) Oral once PRN Blood Glucose LESS THAN 70 milliGRAM(s)/deciliter  glucagon  Injectable 1 milliGRAM(s) IntraMuscular once PRN Glucose LESS THAN 70 milligrams/deciliter  ondansetron Injectable 4 milliGRAM(s) IV Push every 6 hours PRN Nausea  oxyCODONE    IR 10 milliGRAM(s) Oral every 4 hours PRN Moderate Pain (4 - 6)  sodium chloride 0.65% Nasal 1 Spray(s) Both Nostrils every 4 hours PRN Nasal Congestion    Pertinent Labs:   Finger Sticks:  POCT Blood Glucose.: 137 mg/dL ( @ 08:20)  POCT Blood Glucose.: 115 mg/dL ( @ 21:58)  POCT Blood Glucose.: 222 mg/dL ( @ 16:54)  POCT Blood Glucose.: 170 mg/dL ( @ 12:29)      Skin per nursing documentation: no pressure injuries  Edema: 3+ generalized per nursing documentation    Estimated Needs:   [x ] no change since previous assessment  [ ] recalculated:     Previous Nutrition Diagnosis: Mild Malnutrition  Nutrition Diagnosis is: ongoing, addressed with po diet    New Nutrition Diagnosis: n/a       Interventions:   Recommend  1) Continue current diet order of regular texture, Consistent Carbohydrate, DASH/TLC  2) Obtain/honor food preferences within diet restrictions, assist with menu ordering   3) Encourage po intake at meal times    Monitoring and Evaluation:     Continue to monitor Nutritional intake, Tolerance to diet prescription, weights, labs, skin integrity    RD remains available upon request and will follow up per protocol    Lana Engle, Dietetic Intern  Pager #[phone]

## 2019-04-02 NOTE — PROGRESS NOTE ADULT - ASSESSMENT
63 year old female with HTN, DM2, COPD/CHRIS on nocturnal CPAP, discharged 3/20 from [facility] after a prolonged hospitalization for SBO requiring lysis of adhesions, complicated by hypercapnic respiratory failure requiring intubation. GI consulted for anemia.

## 2019-04-02 NOTE — PROGRESS NOTE ADULT - SUBJECTIVE AND OBJECTIVE BOX
Patient is a 63y old  Female who presents with a chief complaint of sob (02 Apr 2019 06:56)      Any change in ROS: She says she is feeling better:  secretions are much less:       MEDICATIONS  (STANDING):  ALBUTerol/ipratropium for Nebulization 3 milliLiter(s) Nebulizer every 6 hours  buDESOnide  80 MICROgram(s)/formoterol 4.5 MICROgram(s) Inhaler 2 Puff(s) Inhalation two times a day  dextrose 5%. 1000 milliLiter(s) (50 mL/Hr) IV Continuous <Continuous>  dextrose 50% Injectable 12.5 Gram(s) IV Push once  dextrose 50% Injectable 25 Gram(s) IV Push once  dextrose 50% Injectable 25 Gram(s) IV Push once  docusate sodium 100 milliGRAM(s) Oral three times a day  enoxaparin Injectable 40 milliGRAM(s) SubCutaneous every 24 hours  fentaNYL   Patch  25 MICROgram(s)/Hr 1 Patch Transdermal every 72 hours  furosemide    Tablet 40 milliGRAM(s) Oral two times a day  gabapentin 300 milliGRAM(s) Oral three times a day  guaiFENesin   Syrup  (Sugar-Free) 200 milliGRAM(s) Oral every 6 hours  influenza   Vaccine 0.5 milliLiter(s) IntraMuscular once  insulin glargine Injectable (LANTUS) 20 Unit(s) SubCutaneous at bedtime  insulin lispro (HumaLOG) corrective regimen sliding scale   SubCutaneous three times a day before meals  insulin lispro Injectable (HumaLOG) 10 Unit(s) SubCutaneous three times a day before meals  levothyroxine 137 MICROGram(s) Oral daily  nystatin Powder 1 Application(s) Topical two times a day  pantoprazole    Tablet 40 milliGRAM(s) Oral two times a day  polyethylene glycol 3350 17 Gram(s) Oral two times a day  predniSONE   Tablet 20 milliGRAM(s) Oral daily  primidone 50 milliGRAM(s) Oral daily  senna 2 Tablet(s) Oral at bedtime  sildenafil (REVATIO) 20 milliGRAM(s) Oral three times a day  simvastatin 20 milliGRAM(s) Oral at bedtime  sucralfate 1 Gram(s) Oral four times a day  traMADol 50 milliGRAM(s) Oral two times a day    MEDICATIONS  (PRN):  dextrose 40% Gel 15 Gram(s) Oral once PRN Blood Glucose LESS THAN 70 milliGRAM(s)/deciliter  glucagon  Injectable 1 milliGRAM(s) IntraMuscular once PRN Glucose LESS THAN 70 milligrams/deciliter  ondansetron Injectable 4 milliGRAM(s) IV Push every 6 hours PRN Nausea  oxyCODONE    IR 10 milliGRAM(s) Oral every 4 hours PRN Moderate Pain (4 - 6)  sodium chloride 0.65% Nasal 1 Spray(s) Both Nostrils every 4 hours PRN Nasal Congestion    Vital Signs Last 24 Hrs  T(C): 36.8 (02 Apr 2019 07:41), Max: 37.3 (01 Apr 2019 23:38)  T(F): 98.2 (02 Apr 2019 07:41), Max: 99.1 (01 Apr 2019 23:38)  HR: 82 (02 Apr 2019 07:41) (76 - 98)  BP: 166/70 (02 Apr 2019 07:41) (111/78 - 166/70)  BP(mean): --  RR: 18 (02 Apr 2019 07:41) (18 - 18)  SpO2: 96% (02 Apr 2019 07:41) (96% - 100%)    I&O's Summary    01 Apr 2019 07:01  -  02 Apr 2019 07:00  --------------------------------------------------------  IN: 220 mL / OUT: 0 mL / NET: 220 mL          Physical Exam:   GENERAL: Obese+  HEENT: KWAME/   Atraumatic, Normocephalic  ENMT: No tonsillar erythema, exudates, or enlargement; Moist mucous membranes, Good dentition, No lesions  NECK: Supple, No JVD, Normal thyroid  CHEST/LUNG: Clear to auscultaion  CVS: Regular rate and rhythm; No murmurs, rubs, or gallops  GI: : Soft, Nontender, Nondistended; Bowel sounds present  NERVOUS SYSTEM:  Alert & Oriented X3  EXTREMITIES:  - edema  LYMPH: No lymphadenopathy noted  SKIN: No rashes or lesions  ENDOCRINOLOGY: No Thyromegaly  PSYCH: Appropriate    Labs:                CAPILLARY BLOOD GLUCOSE      POCT Blood Glucose.: 137 mg/dL (02 Apr 2019 08:20)  POCT Blood Glucose.: 115 mg/dL (01 Apr 2019 21:58)  POCT Blood Glucose.: 222 mg/dL (01 Apr 2019 16:54)  POCT Blood Glucose.: 170 mg/dL (01 Apr 2019 12:29)      < from: [facility] Duplex Lower Ext Vein Scan, Nevaeh (03.28.19 @ 15:07) >      INTERPRETATION:  CLINICAL INFORMATION: Shortness of breath. Patient   states history of DVT. Limited mobility. Lower extremity swelling. Right   lower extremity pain.    COMPARISON: 1/30/2019.    TECHNIQUE: Duplex sonography of the BILATERAL LOWER extremities with   color and spectral Doppler, with and without compression.      FINDINGS:    There is normal compressibility of the bilateral common femoral, femoral   and popliteal veins. Bilateral calf veins cannot be visualized due to   swelling and patient's body habitus.    Doppler examination shows normal spontaneous and phasic flow in the   visualized venous segments.    IMPRESSION:     No evidence of bilateral proximal lower extremity deep venous thrombosis.   Bilateral calf veins not visualized and therefore not evaluated due to   swelling and patient's body habitus.            < from: Xray Chest 1 View- PORTABLE-Urgent (03.27.19 @ 11:47) >    INDICATION: Shortness of breath    COMPARISON: 3/21/2019    FINDINGS/  IMPRESSION:    Pulmonary edema is slightly increased. Small bilateral pleural effusions.   No pneumothorax.    The cardiac silhouette remains enlarged. Severe degenerative changes of   the right shoulder.                    DANIEL TABARES M.D., ATTENDING RADIOLOGIST  This document has been electronically signed. Mar 27 2019 11:49AM              < end of copied text >              ERINN HERNDON M.D., ATTENDING RADIOLOGIST  This document has been electronically signed. Mar 28 2019  3:32PM        < end of copied text >              RECENT CULTURES:        RESPIRATORY CULTURES:          Studies  Chest X-RAY  CT SCAN Chest   Venous Dopplers: LE:   CT Abdomen  Others

## 2019-04-02 NOTE — PROGRESS NOTE ADULT - ATTENDING COMMENTS
Patient seen and examined, agree with above assessment and plan as transcribed above.    - Clinically improving    Vimal Joseph MD, [facility]  BEEPER [phone]

## 2019-04-02 NOTE — CHART NOTE - NSCHARTNOTEFT_GEN_A_CORE
Medicine NP     Late entry from 3/22/219    63yof w/ HTN, DM2, COPD/CHRIS on nocturnal CPAP, discharged from [facility] after a prolonged hospitalization for SBO requiring lysis of adhesions, complicated by hypercapneic respiratory failure requiring intubation. s/p Exploratory laparotomy, closure of abdominal wall on 12/30 at [facility] by Dr Grace.  RN placed consult for concern about  abdominal wound dressing changes.  As per wound care PA she wanted the consult to be deferred to General Surgery.  No recommendation on wound care was offered.    On Exam by me at the bedside:   Noted in the midline around the umbilical area, in the lower abd, several  small cm area of denuded epithelium.  No exudate/ slough or purulent drainage .  An uninfected operative wound in which no inflammation or swelling noted around the operative wound site  wound noted to be clean, with granulation tissue , reddish in appearance.   Recommended: Did think the need for surgical consultation.  Wound care orders were given by me to the primary RN  Follow up wound daily.  If wound healing is noticed to be delayed or any worsening ,please reconsult wound care or surgical team.  Medical attending was made aware.

## 2019-04-02 NOTE — CONSULT NOTE ADULT - CONSULT REQUESTED DATE/TIME
02-Apr-2019 11:10
02-Apr-2019 13:26
22-Mar-2019 09:01
22-Mar-2019 11:49
22-Mar-2019 14:25
26-Mar-2019 14:18
27-Mar-2019 11:17
29-Mar-2019 07:57

## 2019-04-02 NOTE — PROGRESS NOTE ADULT - ASSESSMENT
62 yo F w/ hx of RA p/w PNA, subsequently developed diffuse joint pains.  No significant improvement in pain from increased prednisone 20mg for 5 days.  No joint swelling seen on exam.  Patient describing a "burning" sensation, possibly from neuropathy from her DM.  Her symptoms are non-inflammatory and unlikely to be from RA.    - Changed prednisone back to her home dose of 10mg daily  - Please increase gabapentin to 400mg TID  - Last BMP showing Cr 0.71 on 3/28/19, can add back naproxen 500mg BID for 3 days then decrease to 500mg BID, use this to supplement her pain control  - Continue PPI while on NSAID  - Would also recommend pain management evaluation  - Please make sure patient has script for follow-up CT chest to ensure resolution of GGOs seen to rule out ILD  - Will sign off please call with questions    Kosta Nicole MD  Rheumatology Fellow PGY IV 62 yo F w/ hx of longstanding advanced erosive RA p/w PNA, subsequently developed diffuse joint pains.  # Considering lack of improvement after 5 days of prednisone , joint pain is more likely due to arthritic damage and not active synovitis.    There is no joint swelling seen on exam.  - - Changed prednisone back to her home dose of 10mg daily  # Patient describing a "burning" sensation, suggestive of  neuropathy , possibly due to DM/ cervical myelopathy should be considered in patient with advance severe erosive RA   - xray C spine  in 2/2019 : atlantoaxial and posterior facet alignment maintained.   - consider MRI C spine to evaluate for cervical myelopathy    - Please increase gabapentin to 400mg TID  - Last BMP showing Cr 0.71 on 3/28/19, can add back naproxen 500mg BID for 3 days then decrease to 500mg BID, use this to supplement her pain control  - Continue PPI while on NSAID  - Would also recommend pain management evaluation  - MRI C spine    please call with questions    Kosta Nicole MD  Rheumatology Fellow PGY IV

## 2019-04-02 NOTE — PROGRESS NOTE ADULT - SUBJECTIVE AND OBJECTIVE BOX
pt seen and examined,  no  chest pain , ROS     ALBUTerol/ipratropium for Nebulization 3 milliLiter(s) Nebulizer every 6 hours  buDESOnide  80 MICROgram(s)/formoterol 4.5 MICROgram(s) Inhaler 2 Puff(s) Inhalation two times a day  dextrose 40% Gel 15 Gram(s) Oral once PRN  dextrose 5%. 1000 milliLiter(s) IV Continuous <Continuous>  dextrose 50% Injectable 12.5 Gram(s) IV Push once  dextrose 50% Injectable 25 Gram(s) IV Push once  dextrose 50% Injectable 25 Gram(s) IV Push once  docusate sodium 100 milliGRAM(s) Oral three times a day  enoxaparin Injectable 40 milliGRAM(s) SubCutaneous every 24 hours  fentaNYL   Patch  25 MICROgram(s)/Hr 1 Patch Transdermal every 72 hours  furosemide    Tablet 40 milliGRAM(s) Oral two times a day  gabapentin 300 milliGRAM(s) Oral three times a day  glucagon  Injectable 1 milliGRAM(s) IntraMuscular once PRN  guaiFENesin   Syrup  (Sugar-Free) 200 milliGRAM(s) Oral every 6 hours  influenza   Vaccine 0.5 milliLiter(s) IntraMuscular once  insulin glargine Injectable (LANTUS) 20 Unit(s) SubCutaneous at bedtime  insulin lispro (HumaLOG) corrective regimen sliding scale   SubCutaneous three times a day before meals  insulin lispro Injectable (HumaLOG) 10 Unit(s) SubCutaneous three times a day before meals  levothyroxine 137 MICROGram(s) Oral daily  nystatin Powder 1 Application(s) Topical two times a day  ondansetron Injectable 4 milliGRAM(s) IV Push every 6 hours PRN  oxyCODONE    IR 10 milliGRAM(s) Oral every 4 hours PRN  pantoprazole    Tablet 40 milliGRAM(s) Oral two times a day  polyethylene glycol 3350 17 Gram(s) Oral two times a day  predniSONE   Tablet 20 milliGRAM(s) Oral daily  primidone 50 milliGRAM(s) Oral daily  senna 2 Tablet(s) Oral at bedtime  sildenafil (REVATIO) 20 milliGRAM(s) Oral three times a day  simvastatin 20 milliGRAM(s) Oral at bedtime  sodium chloride 0.65% Nasal 1 Spray(s) Both Nostrils every 4 hours PRN  sucralfate 1 Gram(s) Oral four times a day  traMADol 50 milliGRAM(s) Oral two times a day          Hemoglobin: 10.3 g/dL (03-29 @ 09:31)            Creatinine Trend: 0.71<--, 0.76<--, 0.86<--, 0.97<--, 1.00<--, 1.02<--    COAGS:           T(C): 37.3 (04-01-19 @ 23:38), Max: 37.3 (04-01-19 @ 23:38)  HR: 82 (04-02-19 @ 05:26) (76 - 98)  BP: 115/75 (04-02-19 @ 05:09) (111/78 - 141/86)  RR: 18 (04-01-19 @ 23:38) (18 - 18)  SpO2: 97% (04-02-19 @ 05:26) (96% - 100%)  Wt(kg): --    I&O's Summary    31 Mar 2019 07:01  -  01 Apr 2019 07:00  --------------------------------------------------------  IN: 250 mL / OUT: 950 mL / NET: -700 mL    01 Apr 2019 07:01  -  02 Apr 2019 06:56  --------------------------------------------------------  IN: 220 mL / OUT: 0 mL / NET: 220 mL          Gen: Appears well in NAD  HEENT:  (-)icterus (-)pallor  CV: N S1 S2 1/6 OJ (+)2 Pulses B/l  Resp:  Clear to ausculatation B/L, normal effort  GI: (+) BS Soft, NT, ND  Lymph:  (-)Edema, (-)obvious lymphadenopathy  Skin: Warm to touch, Normal turgor  Psych: Appropriate mood and affect      ECG:  	sinus Tachycardia 109 BPM, nonspecific T wave abnormality    RADIOLOGY:         CXR:  < from: Xray Chest 1 View- PORTABLE-Urgent (03.21.19 @ 11:18) >  The heart is enlarged. Improvingopacities in both lungs when compared to   previous study done on March 6, 2019. Those changes are compatible with   interstitial fluid however an infectious process cannot be ruled out   entirely. Severe degenerative changes of the right shoulder. Degenerative   changes of the thoracic spine is evident as well.    < end of copied text >      ECHO: pending < from: Transthoracic Echocardiogram (03.26.19 @ 10:29) >  Conclusions:  1. Increased relative wall thickness with normal left  ventricular mass index, consistent with concentric left  ventricular remodeling.  2. Hyperdynamic left ventricular systolic function.  3. Estimated pulmonary artery systolic pressure equals 42  mm Hg, assuming right atrial pressureequals 8 mm Hg,  consistent with mild pulmonary pressures.    < end of copied text >      ASSESSMENT/PLAN: 	63y Female  HTN, DM2, COPD/CHRIS on nocturnal CPAP, normal LV function un echo 1/19 moderate pulmonary HTN discharged yesterday from [facility] after a prolonged hospitalization for SBO requiring lysis of adhesions, complicated by hypercapneic respiratory failure requiring intubation now being treated for HCAP..    - tolerating diuresis , keep on dry side , cont lasix   - 3/26 ECHO ,   hyperdynamic LV fx   - no further CV work up needed at present  -  cont All meds for pulm htn    -  GI / DVT prophylaxis,  keep K>4, mag >2.0  - D/C planning   D/W Dr Joseph

## 2019-04-02 NOTE — CONSULT NOTE ADULT - CONSULT REASON
Evaluate wound
PNA
SOB
anemia
chest pain
joint pain
worsening chronic R foot weakness/drop
abdominal wall wound

## 2019-04-02 NOTE — CONSULT NOTE ADULT - CONSULT REQUESTED BY NAME
8th [facility] medicine
DR LALITHA Novoa
Dr. Novoa
Dr. Novoa
Medicine
Mirna Jacob
Wound care
Dr Novoa

## 2019-04-02 NOTE — CONSULT NOTE ADULT - SUBJECTIVE AND OBJECTIVE BOX
Wound SURGERY CONSULT NOTE    HPI:  63yof w/ HTN, DM2, COPD/CHRIS on nocturnal CPAP, discharged yesterday from [facility] after a prolonged hospitalization for SBO requiring lysis of adhesions, complicated by hypercapneic respiratory failure requiring intubation. This morning pt awoke with shortness of breath and chest tightness, was supposed to use CPAP last night but nursing facility did not have the machine. Pt oxygen dependent on 4L at baseline. Feeling better now with supplemental oxygen. No fevers or chills. Occasional cough.    Wound consult requested to assist w/ management of abdominal wound.  Pt s/p  Exploratory laparotomy, lysis of adhesions for     Complete small bowel obstruction due to an adhesive band pt w/ complicated hospital post op course including a RTOR for Exploratory laparotomy, closure of abdominal wall for Evisceration of the intraperitoneal contents.  Pt states that she doesn't like her operating surgeon.  She can follow up at wound center.  We suggested that she is followed by a general surgeon for her post are being op care as she was quite sick and had major surgery, so as to make sure she is making a good recovery and to keep an eye on her possible incisional hernia.  ADAPTIC dressings  used awaiting consult. No drainage, odor, redness, warmth, pain, f/c/s noted. Drainage managed by dressing.   All questions asked and answered to pt's satisfaction.       PAST MEDICAL & SURGICAL HISTORY:  Diverticulosis  RA (Rheumatoid Arthritis)  Tooth Abscess  Arthritis  Cigarette Smoker  Chronic Asthma  Adult Hypothyroidism  Borderline Diabetes Mellitus  High Cholesterol  HTN  Wrist Disorder: S/P Surgery  s/p  Section      REVIEW OF SYSTEMS:	  GI/Skin: see HPI  All other systems negative    MEDICATIONS  (STANDING):  ALBUTerol/ipratropium for Nebulization 3 milliLiter(s) Nebulizer every 6 hours  buDESOnide  80 MICROgram(s)/formoterol 4.5 MICROgram(s) Inhaler 2 Puff(s) Inhalation two times a day  dextrose 5%. 1000 milliLiter(s) (50 mL/Hr) IV Continuous <Continuous>  dextrose 50% Injectable 12.5 Gram(s) IV Push once  dextrose 50% Injectable 25 Gram(s) IV Push once  dextrose 50% Injectable 25 Gram(s) IV Push once  docusate sodium 100 milliGRAM(s) Oral three times a day  enoxaparin Injectable 40 milliGRAM(s) SubCutaneous every 24 hours  fentaNYL   Patch  25 MICROgram(s)/Hr 1 Patch Transdermal every 72 hours  furosemide    Tablet 40 milliGRAM(s) Oral two times a day  gabapentin 300 milliGRAM(s) Oral three times a day  guaiFENesin   Syrup  (Sugar-Free) 200 milliGRAM(s) Oral every 6 hours  influenza   Vaccine 0.5 milliLiter(s) IntraMuscular once  insulin glargine Injectable (LANTUS) 20 Unit(s) SubCutaneous at bedtime  insulin lispro (HumaLOG) corrective regimen sliding scale   SubCutaneous three times a day before meals  insulin lispro Injectable (HumaLOG) 10 Unit(s) SubCutaneous three times a day before meals  levothyroxine 137 MICROGram(s) Oral daily  nystatin Powder 1 Application(s) Topical two times a day  pantoprazole    Tablet 40 milliGRAM(s) Oral two times a day  polyethylene glycol 3350 17 Gram(s) Oral two times a day  predniSONE   Tablet 20 milliGRAM(s) Oral daily  primidone 50 milliGRAM(s) Oral daily  senna 2 Tablet(s) Oral at bedtime  sildenafil (REVATIO) 20 milliGRAM(s) Oral three times a day  simvastatin 20 milliGRAM(s) Oral at bedtime  sucralfate 1 Gram(s) Oral four times a day  traMADol 50 milliGRAM(s) Oral two times a day    MEDICATIONS  (PRN):  dextrose 40% Gel 15 Gram(s) Oral once PRN Blood Glucose LESS THAN 70 milliGRAM(s)/deciliter  glucagon  Injectable 1 milliGRAM(s) IntraMuscular once PRN Glucose LESS THAN 70 milligrams/deciliter  ondansetron Injectable 4 milliGRAM(s) IV Push every 6 hours PRN Nausea  oxyCODONE    IR 10 milliGRAM(s) Oral every 4 hours PRN Moderate Pain (4 - 6)  sodium chloride 0.65% Nasal 1 Spray(s) Both Nostrils every 4 hours PRN Nasal Congestion      No Known Allergies    Intolerances    Seroquel (Other)    SOCIAL HISTORY: pt came from  [facility]; Former smoker, Denies current smoking, ETOH, drugs    FAMILY HISTORY:  No pertinent family history in first degree relatives      Vital Signs Last 24 Hrs  T(C): 36.8 (2019 07:41), Max: 37.3 (2019 23:38)  T(F): 98.2 (2019 07:41), Max: 99.1 (2019 23:38)  HR: 99 (2019 11:47) (76 - 99)  BP: 166/70 (2019 07:41) (111/78 - 166/70)  BP(mean): --  RR: 18 (2019 07:41) (18 - 18)  SpO2: 99% (2019 11:47) (96% - 100%)    NAD /  A&Ox3/  MO/ Obese/ frail  WD/ WN/ Disheveled  Versa Care P500 bed    Cardiovascular: RRR     Respiratory: CTA    Gastrointestinal soft NT/ND (+)BS      Moist Superficial Abdominal wall wound- skin dehiscence   12cm x 9cm x 0.1cm  healthy   hypopigmented newly epithelial skin on periphery  scant serosanguinous drainage  No odor, erythema, increased warmth, tenderness, induration, fluctuance      Neurology  weakened strength & sensation grossly intact    Musculoskeletal/Vascular: FROM x4  BLE mild edema equal  BLE equally warm  no acute ischemia noted    Skin:  moist w/ good turgor    LABS:  Complete Blood Count (19 @ 09:31)    WBC Count: 10.5 K/uL    RBC Count: 3.57 M/uL    Hemoglobin: 10.3 g/dL    Hematocrit: 32.4 %    Mean Cell Volume: 91.0 fl    Mean Cell Hemoglobin: 28.9 pg    Mean Cell Hemoglobin Conc: 31.8 gm/dL    Red Cell Distrib Width: 16.2 %    Platelet Count - Automated: 479 K/uL    Basic Metabolic Panel in AM (19 @ 09:39)    Sodium, Serum: 136 mmol/L    Potassium, Serum: 4.2 mmol/L    Chloride, Serum: 95 mmol/L    Carbon Dioxide, Serum: 24 mmol/L    Anion Gap, Serum: 17 mmol/L    Blood Urea Nitrogen, Serum: 19 mg/dL    Creatinine, Serum: 0.71 mg/dL    Glucose, Serum: 197 mg/dL    Calcium, Total Serum: 9.6 mg/dL      RADIOLOGY & ADDITIONAL STUDIES:    < from: Xray Chest 1 View- PORTABLE-Urgent (19 @ 11:47) >  FINDINGS/  IMPRESSION:    Pulmonary edema is slightly increased. Small bilateral pleural effusions.   No pneumothorax.    The cardiac silhouette remains enlarged. Severe degenerative changes of   the right shoulder.    < from: CT Abdomen and Pelvis w/ Oral Cont and w/ IV Cont (19 @ 11:24) >  INTERPRETATION:  CLINICAL INFORMATION: 63-year-old female status post   exploratory laparotomy for small bowel obstruction complicated by   evisceration of bowel, post placement of retention suture. Afebrile.      COMPARISON: CT chest, abdomen and pelvis 2019.    PROCEDURE:   CT of the Abdomen and Pelvis was performed with intravenous contrast.   Intravenous contrast: 90 ml Omnipaque 350. 10 ml discarded.  Oral contrast: positive contrast was administered.  Sagittal and coronal reformats were performed.    Evaluation of the upper abdomen is limited secondary to streak artifact.    FINDINGS:    LOWER CHEST: Multifocal airspace opacities, with increased left lower   lobe and right middle lobe consolidation as compared to 2019.   2019.    LIVER: Within normal limits.  BILE DUCTS: Normal caliber.  GALLBLADDER: Within normal limits.  SPLEEN: Subcentimeter hypodense focus in the spleen, too small to   characterize.  PANCREAS: Within normal limits.  ADRENALS: Diminutive in size bilaterally.  KIDNEYS/URETERS: Bilateral cysts and subcentimeter hypodensities, too   small to characterize. No hydronephrosis. A 2 mm nonobstructing right   intrarenal calculus.    BLADDER: Partially collapsed with a Antoine in place. Small amount of   intraluminal gas consistent with the presence of the Antoine catheter.  REPRODUCTIVE ORGANS: Fibroid uterus. No adnexal mass.    BOWEL: Enteric tube in the stomach. Rectal tube. No bowel obstruction.   Appendix is normal. Colonic diverticulosis without diverticulitis.  PERITONEUM: No ascites.  VESSELS:  Within normal limits.  RETROPERITONEUM: No lymphadenopathy.    ABDOMINAL WALL: Ventral midline diastasis and open wound. No discrete   abscess or drainable collection. Surrounding soft tissue stranding.   BONES: Degenerative changes with endplate sclerosis at T8-T9, similar to   2017. Old right femoral head fracture, with severe degenerative   changes of the right hip. Sclerosis of the left sacroiliac joint.    IMPRESSION:   Ventral midline diastasis and open wound without evidence for discrete   abscess or drainable collection.    Multifocal pneumonia, with increased consolidation in the right middle   and left lower lobes since 2018.    Cultures:    Culture - Blood (19 @ 18:18)    Specimen Source: .Blood Blood    Culture Results:   No growth at 5 days.

## 2019-04-02 NOTE — PROGRESS NOTE ADULT - SUBJECTIVE AND OBJECTIVE BOX
Interval Events: No new overnight event.  No N/V/D.  Tolerating diet.  epigastric pain is improved  skin irritation around RLQ that is bothering her   tolerating PO well  + brown stool as per RN     MEDICATIONS  (STANDING):  ALBUTerol/ipratropium for Nebulization 3 milliLiter(s) Nebulizer every 6 hours  buDESOnide  80 MICROgram(s)/formoterol 4.5 MICROgram(s) Inhaler 2 Puff(s) Inhalation two times a day  dextrose 5%. 1000 milliLiter(s) (50 mL/Hr) IV Continuous <Continuous>  dextrose 50% Injectable 12.5 Gram(s) IV Push once  dextrose 50% Injectable 25 Gram(s) IV Push once  dextrose 50% Injectable 25 Gram(s) IV Push once  docusate sodium 100 milliGRAM(s) Oral three times a day  enoxaparin Injectable 40 milliGRAM(s) SubCutaneous every 24 hours  fentaNYL   Patch  25 MICROgram(s)/Hr 1 Patch Transdermal every 72 hours  furosemide    Tablet 40 milliGRAM(s) Oral two times a day  gabapentin 300 milliGRAM(s) Oral three times a day  guaiFENesin   Syrup  (Sugar-Free) 200 milliGRAM(s) Oral every 6 hours  influenza   Vaccine 0.5 milliLiter(s) IntraMuscular once  insulin glargine Injectable (LANTUS) 20 Unit(s) SubCutaneous at bedtime  insulin lispro (HumaLOG) corrective regimen sliding scale   SubCutaneous three times a day before meals  insulin lispro Injectable (HumaLOG) 10 Unit(s) SubCutaneous three times a day before meals  levothyroxine 137 MICROGram(s) Oral daily  nystatin Powder 1 Application(s) Topical two times a day  pantoprazole    Tablet 40 milliGRAM(s) Oral two times a day  polyethylene glycol 3350 17 Gram(s) Oral two times a day  predniSONE   Tablet 20 milliGRAM(s) Oral daily  primidone 50 milliGRAM(s) Oral daily  senna 2 Tablet(s) Oral at bedtime  sildenafil (REVATIO) 20 milliGRAM(s) Oral three times a day  simvastatin 20 milliGRAM(s) Oral at bedtime  sucralfate 1 Gram(s) Oral four times a day  traMADol 50 milliGRAM(s) Oral two times a day    MEDICATIONS  (PRN):  dextrose 40% Gel 15 Gram(s) Oral once PRN Blood Glucose LESS THAN 70 milliGRAM(s)/deciliter  glucagon  Injectable 1 milliGRAM(s) IntraMuscular once PRN Glucose LESS THAN 70 milligrams/deciliter  ondansetron Injectable 4 milliGRAM(s) IV Push every 6 hours PRN Nausea  oxyCODONE    IR 10 milliGRAM(s) Oral every 4 hours PRN Moderate Pain (4 - 6)  sodium chloride 0.65% Nasal 1 Spray(s) Both Nostrils every 4 hours PRN Nasal Congestion      Allergies    No Known Allergies    Intolerances    Seroquel (Other)      Review of Systems:    General:  No wt loss, fevers, chills, night sweats,fatigue,   Eyes:  Good vision, no reported pain  ENT:  No sore throat, pain, runny nose, dysphagia  CV:  No pain, palpitations, hypo/hypertension  Resp:  No dyspnea, cough, tachypnea, wheezing  GI:  No pain, No nausea, No vomiting, No diarrhea, No constipation, No weight loss, No fever, No pruritis, No rectal bleeding, No melena, No dysphagia  :  No pain, bleeding, incontinence, nocturia  Muscle:  No pain, weakness  Neuro:  No weakness, tingling, memory problems  Psych:  No fatigue, insomnia, mood problems, depression  Endocrine:  No polyuria, polydypsia, cold/heat intolerance  Heme:  No petechiae, ecchymosis, easy bruisability  Skin:  No rash, tattoos, scars, edema      Vital Signs Last 24 Hrs  T(C): 36.8 (02 Apr 2019 07:41), Max: 37.3 (01 Apr 2019 23:38)  T(F): 98.2 (02 Apr 2019 07:41), Max: 99.1 (01 Apr 2019 23:38)  HR: 82 (02 Apr 2019 07:41) (76 - 98)  BP: 166/70 (02 Apr 2019 07:41) (111/78 - 166/70)  BP(mean): --  RR: 18 (02 Apr 2019 07:41) (18 - 18)  SpO2: 96% (02 Apr 2019 07:41) (96% - 100%)    PHYSICAL EXAM:    GENERAL:  Appears stated age, well-groomed, well-nourished, no distress  HEENT:  NC/AT,  conjunctivae clear and pink, no thyromegaly, nodules, adenopathy, no JVD, sclera -anicteric  CHEST:  Full & symmetric excursion, no increased effort, breath sounds clear  HEART:  Regular rhythm, S1, S2, no murmur/rub/S3/S4, no abdominal bruit, no edema  ABDOMEN:  Soft, non-tender, non-distended,+ abdominal incision dressed-- c/d/i   EXTREMITIES:  no cyanosis, clubbing or edema  SKIN:  No rash/erythema/ecchymoses/petechiae/wounds/abscess/warm/dry  NEURO:  Alert, oriented, no asterixis, no tremor, no encephalopathy      LABS:                RADIOLOGY & ADDITIONAL TESTS:

## 2019-04-02 NOTE — CONSULT NOTE ADULT - ASSESSMENT
63F with SBO requiring exlap 12/16/18 and then RTOR for evisceration on 12/30/18 presents with old surgical wound and questionable hernia in LLQ  - Continue wound care per wound care team  - CT abdomen/pelvis to evaluate if patient has a hernia  - No additional surgical intervention  - Seen and examined with Dr. Parham

## 2019-04-02 NOTE — PROGRESS NOTE ADULT - ATTENDING COMMENTS
pt seems stable from pulm point of view: finished HAP  rx: on bipap at night: She is alert and awake: She would need follow up ct chest in 6 -8 weeks time: DC Planning:  3/31: PT IS STABLE FROM PULMONARY POINT OF VIEW: FINISHED ANTIBIOTICS: TOLERATING BIPAP  4/1: no need for  sinuses ct inpt: Her breathing has been good: No wheezing: cont bd: As wellas bipap at night!  4/5: stable

## 2019-04-02 NOTE — CONSULT NOTE ADULT - ATTENDING COMMENTS
62 yo female , currently maintained on 20mg Prednisone daily, with abdominal wound  S/P prior C section  Had x lap , enterolysis , at [facility] in Dec 2018, with reported episode pf evisceration and reclosure post op  required prolonged intubation post op, with h/o PNA  Currently at bedrest, using nasal O2  Moon facies  Obese  Somewhat labored respirations  markedly atrophic skin  RA deformities of both hands and fingers , with healed surgical scar dorsal surface right wrist  lower abd scar 2/2 to use of retention sutures and healing by 2' intention  To right of midline, in lower abd, is a several cm area of denuded epithelium  Possible incisional hernia , non tender  role of steroids , Chronic COPD, and wound healing discussed  Consider use of Abdominal Binder, as long as there is no respiratory compromise  Op f/u info provided , as patient is not interested in following with [facility] surgeon 64 yo female , currently maintained on 20mg Prednisone daily, with abdominal wound  S/P prior C section  Had x lap , enterolysis , at [facility] in Dec 2018, with reported episode pf evisceration and reclosure post op  required prolonged intubation post op, with h/o PNA  Last CT A&P done 3 mo ago  Currently at bedrest, using nasal O2  Moon facies  Obese  Somewhat labored respirations  markedly atrophic skin  RA deformities of both hands and fingers , with healed surgical scar dorsal surface right wrist  lower abd scar 2/2 to use of retention sutures and healing by 2' intention  To right of midline, in lower abd, is a several cm area of denuded epithelium  Possible incisional hernia , non tender  role of steroids , Chronic COPD, and wound healing discussed  Consider use of Abdominal Binder, as long as there is no respiratory compromise  Op f/u info provided , as patient is not interested in following with [facility] surgeon

## 2019-04-02 NOTE — CONSULT NOTE ADULT - SUBJECTIVE AND OBJECTIVE BOX
General Surgery Consult  Consulting surgical team: General Surgery  Consulting attending: Prasad    CC: 63F with SBO requiring exlap 18 and then RTOR for evisceration on 18 presents with old surgical wound and questionable hernia in LLQ. She reports that the wound has been healing because she has been directing nursing on how to dress it. She is very dissatisfied with her care. She would like imaging to know if she does have a hernia or not, but she refuses any additional surgical intervention at this time. She has otherwise been tolerating a diet and having bowel function       HPI:  63yof w/ HTN, DM2, COPD/CHRIS on nocturnal CPAP, discharged yesterday from [facility] after a prolonged hospitalization for SBO requiring lysis of adhesions, complicated by hypercapneic respiratory failure requiring intubation. This morning pt awoke with shortness of breath and chest tightness, was supposed to use CPAP last night but nursing facility did not have the machine. Pt oxygen dependent on 4L at baseline. Feeling better now with supplemental oxygen. No fevers or chills. Occasional cough. (21 Mar 2019 19:33)      PAST MEDICAL HISTORY:  Diverticulosis  Cellulitis  CVA (Cerebral Vascular Accident)  RA (Rheumatoid Arthritis)  Tooth Abscess  Arthritis  Cigarette Smoker  Chronic Asthma  Adult Hypothyroidism  Borderline Diabetes Mellitus  High Cholesterol  H/O: HTN      PAST SURGICAL HISTORY:  Wrist Disorder  History of  Section  Exploratory laparotomy with return to OR for fascial dehiscence      MEDICATIONS:  ALBUTerol/ipratropium for Nebulization 3 milliLiter(s) Nebulizer every 6 hours  buDESOnide  80 MICROgram(s)/formoterol 4.5 MICROgram(s) Inhaler 2 Puff(s) Inhalation two times a day  dextrose 40% Gel 15 Gram(s) Oral once PRN  dextrose 5%. 1000 milliLiter(s) IV Continuous <Continuous>  dextrose 50% Injectable 12.5 Gram(s) IV Push once  dextrose 50% Injectable 25 Gram(s) IV Push once  dextrose 50% Injectable 25 Gram(s) IV Push once  docusate sodium 100 milliGRAM(s) Oral three times a day  enoxaparin Injectable 40 milliGRAM(s) SubCutaneous every 24 hours  fentaNYL   Patch  25 MICROgram(s)/Hr 1 Patch Transdermal every 72 hours  furosemide    Tablet 40 milliGRAM(s) Oral two times a day  gabapentin 300 milliGRAM(s) Oral three times a day  glucagon  Injectable 1 milliGRAM(s) IntraMuscular once PRN  guaiFENesin   Syrup  (Sugar-Free) 200 milliGRAM(s) Oral every 6 hours  influenza   Vaccine 0.5 milliLiter(s) IntraMuscular once  insulin glargine Injectable (LANTUS) 20 Unit(s) SubCutaneous at bedtime  insulin lispro (HumaLOG) corrective regimen sliding scale   SubCutaneous three times a day before meals  insulin lispro Injectable (HumaLOG) 10 Unit(s) SubCutaneous three times a day before meals  levothyroxine 137 MICROGram(s) Oral daily  nystatin Powder 1 Application(s) Topical two times a day  ondansetron Injectable 4 milliGRAM(s) IV Push every 6 hours PRN  oxyCODONE    IR 10 milliGRAM(s) Oral every 4 hours PRN  pantoprazole    Tablet 40 milliGRAM(s) Oral two times a day  polyethylene glycol 3350 17 Gram(s) Oral two times a day  predniSONE   Tablet 20 milliGRAM(s) Oral daily  primidone 50 milliGRAM(s) Oral daily  senna 2 Tablet(s) Oral at bedtime  sildenafil (REVATIO) 20 milliGRAM(s) Oral three times a day  simvastatin 20 milliGRAM(s) Oral at bedtime  sodium chloride 0.65% Nasal 1 Spray(s) Both Nostrils every 4 hours PRN  sucralfate 1 Gram(s) Oral four times a day  traMADol 50 milliGRAM(s) Oral two times a day      ALLERGIES:  No Known Allergies  Seroquel (Other)      VITALS & I/Os:  Vital Signs Last 24 Hrs  T(C): 36.8 (2019 07:41), Max: 37.3 (2019 23:38)  T(F): 98.2 (2019 07:41), Max: 99.1 (2019 23:38)  HR: 99 (2019 11:47) (76 - 99)  BP: 166/70 (2019 07:41) (111/78 - 166/70)  BP(mean): --  RR: 18 (2019 07:41) (18 - 18)  SpO2: 99% (2019 11:47) (96% - 100%)    I&O's Summary    2019 07:01  -  2019 07:00  --------------------------------------------------------  IN: 220 mL / OUT: 0 mL / NET: 220 mL    2019 07:01  -  2019 13:26  --------------------------------------------------------  IN: 220 mL / OUT: 0 mL / NET: 220 mL        PHYSICAL EXAM:  General: No acute distress  Respiratory: on NC  Cardiovascular: RRR  Abdominal: Soft, nondistended, appropriately tender. Old surgical wound healing with granulation tissue, no erythema or purulence noted  Extremities: Warm      IMAGING: None at this time

## 2019-04-02 NOTE — PROGRESS NOTE ADULT - ASSESSMENT
63yof w/ HTN, DM2, COPD/CHRIS on nocturnal CPAP, discharged from [facility] after a prolonged hospitalization for SBO requiring lysis of adhesions, complicated by hypercapneic respiratory failure requiring intubation. Pt awoke with shortness of breath and chest tightness, was supposed to use CPAP last night but nursing facility did not have the machine. Pt oxygen dependent on 4L at baseline. Feeling better now with supplemental oxygen. No fevers or chills. Occasional cough.  originally admitted on 12/15 for SBO s/p ex lap with lysis of adhesions (4 retention sutures) c/b 12/30 she eviscerated 2/2 a coughing episode s/p ex lap (6 retention sutures) c/b multifocal pneumonia, developed hypercapnic respiratory failure requiring intubation 1/6, s/p bronch 1/9/18, and extubated on 1/24, course further c/b Enterococci bacteremia now readmitted to MICU for hypoxic respiratory failure 2/2 atelectasis requiring intubation 01/31. Had grown ESBL in urine, s/p ertapenem.  Extubated to high flow/ BIPAP.

## 2019-04-02 NOTE — STUDENT SIGN OFF DOCUMENT - DOCUMENTS STUDENTS ARE SIGNED OFF ON
Input and Output/Assessment and Intervention/Vital Signs/Plan of Care Dietitian Initial Evaluation/Patient Profile

## 2019-04-02 NOTE — PROGRESS NOTE ADULT - ASSESSMENT
Problem/Plan - 1:  ·  Problem: HAP (hospital-acquired pneumonia).  Plan: finished antibiotics  id and pulmonary fu  will monitor.   spoke with pulmonary , CT sinus not needed a inpt, can be done as outpt     Problem/Plan - 2:  ·  Problem: Chronic hypoxic respiratory failure , Oxygen dependent.  Plan: bipap at night   pulmonary fu.   no need for CT sinus as inpt     Problem/Plan - 3:  ·  Problem: Diabetes.  Plan: monitor FS  ISS.     Problem/Plan - 4:  ·  Problem: foot drop.  Plan: rheum fu appreciated  chronic   no inpt [facility]man as per neuro

## 2019-04-02 NOTE — PROGRESS NOTE ADULT - SUBJECTIVE AND OBJECTIVE BOX
Patient is a 63y old  Female who presents with a chief complaint of sob (02 Apr 2019 13:25)      INTERVAL HPI/OVERNIGHT EVENTS:  T(C): 36.9 (04-02-19 @ 15:32), Max: 37.3 (04-01-19 @ 23:38)  HR: 86 (04-02-19 @ 15:32) (76 - 99)  BP: 145/74 (04-02-19 @ 15:32) (115/75 - 166/70)  RR: 18 (04-02-19 @ 15:32) (18 - 18)  SpO2: 95% (04-02-19 @ 15:32) (95% - 100%)  Wt(kg): --  I&O's Summary    01 Apr 2019 07:01  -  02 Apr 2019 07:00  --------------------------------------------------------  IN: 220 mL / OUT: 0 mL / NET: 220 mL    02 Apr 2019 07:01  -  02 Apr 2019 16:42  --------------------------------------------------------  IN: 480 mL / OUT: 0 mL / NET: 480 mL        LABS:              CAPILLARY BLOOD GLUCOSE      POCT Blood Glucose.: 187 mg/dL (02 Apr 2019 12:16)  POCT Blood Glucose.: 137 mg/dL (02 Apr 2019 08:20)  POCT Blood Glucose.: 115 mg/dL (01 Apr 2019 21:58)  POCT Blood Glucose.: 222 mg/dL (01 Apr 2019 16:54)            MEDICATIONS  (STANDING):  ALBUTerol/ipratropium for Nebulization 3 milliLiter(s) Nebulizer every 6 hours  buDESOnide  80 MICROgram(s)/formoterol 4.5 MICROgram(s) Inhaler 2 Puff(s) Inhalation two times a day  dextrose 5%. 1000 milliLiter(s) (50 mL/Hr) IV Continuous <Continuous>  dextrose 50% Injectable 12.5 Gram(s) IV Push once  dextrose 50% Injectable 25 Gram(s) IV Push once  dextrose 50% Injectable 25 Gram(s) IV Push once  docusate sodium 100 milliGRAM(s) Oral three times a day  enoxaparin Injectable 40 milliGRAM(s) SubCutaneous every 24 hours  fentaNYL   Patch  25 MICROgram(s)/Hr 1 Patch Transdermal every 72 hours  furosemide    Tablet 40 milliGRAM(s) Oral two times a day  gabapentin 300 milliGRAM(s) Oral three times a day  guaiFENesin   Syrup  (Sugar-Free) 200 milliGRAM(s) Oral every 6 hours  influenza   Vaccine 0.5 milliLiter(s) IntraMuscular once  insulin glargine Injectable (LANTUS) 20 Unit(s) SubCutaneous at bedtime  insulin lispro (HumaLOG) corrective regimen sliding scale   SubCutaneous three times a day before meals  insulin lispro Injectable (HumaLOG) 10 Unit(s) SubCutaneous three times a day before meals  levothyroxine 137 MICROGram(s) Oral daily  nystatin Powder 1 Application(s) Topical two times a day  pantoprazole    Tablet 40 milliGRAM(s) Oral two times a day  polyethylene glycol 3350 17 Gram(s) Oral two times a day  predniSONE   Tablet 20 milliGRAM(s) Oral daily  primidone 50 milliGRAM(s) Oral daily  senna 2 Tablet(s) Oral at bedtime  sildenafil (REVATIO) 20 milliGRAM(s) Oral three times a day  simvastatin 20 milliGRAM(s) Oral at bedtime  sucralfate 1 Gram(s) Oral four times a day  traMADol 50 milliGRAM(s) Oral two times a day    MEDICATIONS  (PRN):  dextrose 40% Gel 15 Gram(s) Oral once PRN Blood Glucose LESS THAN 70 milliGRAM(s)/deciliter  glucagon  Injectable 1 milliGRAM(s) IntraMuscular once PRN Glucose LESS THAN 70 milligrams/deciliter  ondansetron Injectable 4 milliGRAM(s) IV Push every 6 hours PRN Nausea  oxyCODONE    IR 10 milliGRAM(s) Oral every 4 hours PRN Moderate Pain (4 - 6)  sodium chloride 0.65% Nasal 1 Spray(s) Both Nostrils every 4 hours PRN Nasal Congestion          PHYSICAL EXAM:  GENERAL: NAD, well-groomed, well-developed  HEAD:  Atraumatic, Normocephalic  CHEST/LUNG: Clear to percussion bilaterally; No rales, rhonchi, wheezing, or rubs  HEART: Regular rate and rhythm; No murmurs, rubs, or gallops  ABDOMEN: Soft, Nontender, Nondistended; Bowel sounds present  EXTREMITIES: R foot drop    Care Discussed with Consultants/Other Providers [ ] YES  [ ] NO

## 2019-04-02 NOTE — CONSULT NOTE ADULT - ATTENDING COMMENTS
Pt seen and examined on 4/2. agree with above.    1. Surgical wound: granulation tissue is healthy, would continue with non-stick dressing such as Adaptic, per wound care team.    2. Likely abdominal wall hernia:  - CT a/p requested    I discussed plan for CT with patient, who agreed that she would like to know the status of her abdominal wall. Will follow up with patient after CT done.

## 2019-04-02 NOTE — PROGRESS NOTE ADULT - SUBJECTIVE AND OBJECTIVE BOX
AFTAB KALI  [id number]    INTERVAL HPI/OVERNIGHT EVENTS:    Patient endorsing a "burning" pain sensation all over, says she wants to be back on her home medications.    MEDICATIONS  (STANDING):  ALBUTerol/ipratropium for Nebulization 3 milliLiter(s) Nebulizer every 6 hours  buDESOnide  80 MICROgram(s)/formoterol 4.5 MICROgram(s) Inhaler 2 Puff(s) Inhalation two times a day  dextrose 5%. 1000 milliLiter(s) (50 mL/Hr) IV Continuous <Continuous>  dextrose 50% Injectable 12.5 Gram(s) IV Push once  dextrose 50% Injectable 25 Gram(s) IV Push once  dextrose 50% Injectable 25 Gram(s) IV Push once  docusate sodium 100 milliGRAM(s) Oral three times a day  enoxaparin Injectable 40 milliGRAM(s) SubCutaneous every 24 hours  fentaNYL   Patch  25 MICROgram(s)/Hr 1 Patch Transdermal every 72 hours  furosemide    Tablet 40 milliGRAM(s) Oral two times a day  gabapentin 300 milliGRAM(s) Oral three times a day  guaiFENesin   Syrup  (Sugar-Free) 200 milliGRAM(s) Oral every 6 hours  influenza   Vaccine 0.5 milliLiter(s) IntraMuscular once  insulin glargine Injectable (LANTUS) 20 Unit(s) SubCutaneous at bedtime  insulin lispro (HumaLOG) corrective regimen sliding scale   SubCutaneous three times a day before meals  insulin lispro Injectable (HumaLOG) 10 Unit(s) SubCutaneous three times a day before meals  levothyroxine 137 MICROGram(s) Oral daily  nystatin Powder 1 Application(s) Topical two times a day  pantoprazole    Tablet 40 milliGRAM(s) Oral two times a day  polyethylene glycol 3350 17 Gram(s) Oral two times a day  primidone 50 milliGRAM(s) Oral daily  senna 2 Tablet(s) Oral at bedtime  sildenafil (REVATIO) 20 milliGRAM(s) Oral three times a day  simvastatin 20 milliGRAM(s) Oral at bedtime  sucralfate 1 Gram(s) Oral four times a day  traMADol 50 milliGRAM(s) Oral two times a day    MEDICATIONS  (PRN):  dextrose 40% Gel 15 Gram(s) Oral once PRN Blood Glucose LESS THAN 70 milliGRAM(s)/deciliter  glucagon  Injectable 1 milliGRAM(s) IntraMuscular once PRN Glucose LESS THAN 70 milligrams/deciliter  ondansetron Injectable 4 milliGRAM(s) IV Push every 6 hours PRN Nausea  oxyCODONE    IR 10 milliGRAM(s) Oral every 4 hours PRN Moderate Pain (4 - 6)  sodium chloride 0.65% Nasal 1 Spray(s) Both Nostrils every 4 hours PRN Nasal Congestion      Allergies    No Known Allergies    Intolerances    Seroquel (Other)      Review of Systems:   General: No fevers/chills, no fatigue  HEENT: No blurry vision, dysphagia, or odynophagia  CVS: No CP/palpitations  Resp: No SOB/wheezing  GI: No N/V/C/D/abdominal pain  MSK: +joint pains  Skin: No new rashes  Neuro: No headaches      Vital Signs Last 24 Hrs  T(C): 36.9 (02 Apr 2019 15:32), Max: 37.3 (01 Apr 2019 23:38)  T(F): 98.5 (02 Apr 2019 15:32), Max: 99.1 (01 Apr 2019 23:38)  HR: 86 (02 Apr 2019 15:32) (76 - 99)  BP: 145/74 (02 Apr 2019 15:32) (115/75 - 166/70)  BP(mean): --  RR: 18 (02 Apr 2019 15:32) (18 - 18)  SpO2: 95% (02 Apr 2019 15:32) (95% - 100%)    Physical Exam:  General: NAD  HEENT: EOMI, MMM  Cardio: +S1/S2, RRR  Resp: CTA b/l  GI: +BS, soft, NT/ND  MSK: no joint swelling, R 3rd digit swan neck deformity, subluxations, MCP hypertrophy, diffuse tenderness to palpation  Neuro: AAOx3  Psych: wnl    LABS:                  RADIOLOGY & ADDITIONAL TESTS: AFTAB KALI  [id number]    INTERVAL HPI/OVERNIGHT EVENTS:    Patient endorsing a "burning" pain sensation all over, says she wants to be back on her home medications.    MEDICATIONS  (STANDING):  ALBUTerol/ipratropium for Nebulization 3 milliLiter(s) Nebulizer every 6 hours  buDESOnide  80 MICROgram(s)/formoterol 4.5 MICROgram(s) Inhaler 2 Puff(s) Inhalation two times a day  dextrose 5%. 1000 milliLiter(s) (50 mL/Hr) IV Continuous <Continuous>  dextrose 50% Injectable 12.5 Gram(s) IV Push once  dextrose 50% Injectable 25 Gram(s) IV Push once  dextrose 50% Injectable 25 Gram(s) IV Push once  docusate sodium 100 milliGRAM(s) Oral three times a day  enoxaparin Injectable 40 milliGRAM(s) SubCutaneous every 24 hours  fentaNYL   Patch  25 MICROgram(s)/Hr 1 Patch Transdermal every 72 hours  furosemide    Tablet 40 milliGRAM(s) Oral two times a day  gabapentin 300 milliGRAM(s) Oral three times a day  guaiFENesin   Syrup  (Sugar-Free) 200 milliGRAM(s) Oral every 6 hours  influenza   Vaccine 0.5 milliLiter(s) IntraMuscular once  insulin glargine Injectable (LANTUS) 20 Unit(s) SubCutaneous at bedtime  insulin lispro (HumaLOG) corrective regimen sliding scale   SubCutaneous three times a day before meals  insulin lispro Injectable (HumaLOG) 10 Unit(s) SubCutaneous three times a day before meals  levothyroxine 137 MICROGram(s) Oral daily  nystatin Powder 1 Application(s) Topical two times a day  pantoprazole    Tablet 40 milliGRAM(s) Oral two times a day  polyethylene glycol 3350 17 Gram(s) Oral two times a day  primidone 50 milliGRAM(s) Oral daily  senna 2 Tablet(s) Oral at bedtime  sildenafil (REVATIO) 20 milliGRAM(s) Oral three times a day  simvastatin 20 milliGRAM(s) Oral at bedtime  sucralfate 1 Gram(s) Oral four times a day  traMADol 50 milliGRAM(s) Oral two times a day    MEDICATIONS  (PRN):  dextrose 40% Gel 15 Gram(s) Oral once PRN Blood Glucose LESS THAN 70 milliGRAM(s)/deciliter  glucagon  Injectable 1 milliGRAM(s) IntraMuscular once PRN Glucose LESS THAN 70 milligrams/deciliter  ondansetron Injectable 4 milliGRAM(s) IV Push every 6 hours PRN Nausea  oxyCODONE    IR 10 milliGRAM(s) Oral every 4 hours PRN Moderate Pain (4 - 6)  sodium chloride 0.65% Nasal 1 Spray(s) Both Nostrils every 4 hours PRN Nasal Congestion      Allergies    No Known Allergies    Intolerances    Seroquel (Other)      Review of Systems:   General: No fevers/chills, no fatigue  HEENT: No blurry vision, dysphagia, or odynophagia  CVS: No CP/palpitations  Resp: No SOB/wheezing  GI: No N/V/C/D/abdominal pain  MSK: +joint pains  Skin: No new rashes  Neuro: No headaches      Vital Signs Last 24 Hrs  T(C): 36.9 (02 Apr 2019 15:32), Max: 37.3 (01 Apr 2019 23:38)  T(F): 98.5 (02 Apr 2019 15:32), Max: 99.1 (01 Apr 2019 23:38)  HR: 86 (02 Apr 2019 15:32) (76 - 99)  BP: 145/74 (02 Apr 2019 15:32) (115/75 - 166/70)  BP(mean): --  RR: 18 (02 Apr 2019 15:32) (18 - 18)  SpO2: 95% (02 Apr 2019 15:32) (95% - 100%)    Physical Exam:  General: NAD  HEENT: EOMI, MMM  Cardio: +S1/S2, RRR  Resp: CTA b/l  GI: +BS, soft, NT/ND  MSK: no joint swelling, R 3rd digit swan neck deformity, subluxations, MCP hypertrophy, diffuse tenderness to palpation  Neuro: AAOx3, weak handgrip  Psych: wnl    LABS:            < from: Xray Cervical Spine AP and Lateral Only (02.28.13 @ 18:33) >  EXAM:  AP, and open-mouth AP cervical spine from 2/28/2013 at 1829.  No similar prior studies available for comparison.    IMPRESSION:  C7 and T1 obscured on the lateral view by the superimposed   shoulders however appear grossly intact and aligned on the AP projection.    Multilevel degenerative disease manifest by varying degrees of disc space   narrowing (obliterated C3-C4 disc space), disc margin osteophyte   formation, and posterior facet arthrosis.    Slightly reversed cervical lordosis could be related to muscle spasm   and/or the underlying degenerative disease.    Prevertebral soft tissues and predental interval within normal limits.    Atlantoaxial and posterior facet alignment maintained.    Intact odontoid    < end of copied text >        RADIOLOGY & ADDITIONAL TESTS:

## 2019-04-03 LAB
ANION GAP SERPL CALC-SCNC: 18 MMOL/L — HIGH (ref 5–17)
BUN SERPL-MCNC: 26 MG/DL — HIGH (ref 7–23)
CALCIUM SERPL-MCNC: 9.8 MG/DL — SIGNIFICANT CHANGE UP (ref 8.4–10.5)
CHLORIDE SERPL-SCNC: 97 MMOL/L — SIGNIFICANT CHANGE UP (ref 96–108)
CO2 SERPL-SCNC: 25 MMOL/L — SIGNIFICANT CHANGE UP (ref 22–31)
CREAT SERPL-MCNC: 0.74 MG/DL — SIGNIFICANT CHANGE UP (ref 0.5–1.3)
GLUCOSE BLDC GLUCOMTR-MCNC: 122 MG/DL — HIGH (ref 70–99)
GLUCOSE BLDC GLUCOMTR-MCNC: 131 MG/DL — HIGH (ref 70–99)
GLUCOSE BLDC GLUCOMTR-MCNC: 148 MG/DL — HIGH (ref 70–99)
GLUCOSE BLDC GLUCOMTR-MCNC: 210 MG/DL — HIGH (ref 70–99)
GLUCOSE SERPL-MCNC: 110 MG/DL — HIGH (ref 70–99)
POTASSIUM SERPL-MCNC: 4 MMOL/L — SIGNIFICANT CHANGE UP (ref 3.5–5.3)
POTASSIUM SERPL-SCNC: 4 MMOL/L — SIGNIFICANT CHANGE UP (ref 3.5–5.3)
SODIUM SERPL-SCNC: 140 MMOL/L — SIGNIFICANT CHANGE UP (ref 135–145)

## 2019-04-03 PROCEDURE — 74176 CT ABD & PELVIS W/O CONTRAST: CPT | Mod: 26

## 2019-04-03 RX ORDER — GABAPENTIN 400 MG/1
400 CAPSULE ORAL THREE TIMES A DAY
Qty: 0 | Refills: 0 | Status: DISCONTINUED | OUTPATIENT
Start: 2019-04-03 | End: 2019-04-09

## 2019-04-03 RX ADMIN — Medication 3 MILLILITER(S): at 06:09

## 2019-04-03 RX ADMIN — Medication 10 MILLIGRAM(S): at 06:10

## 2019-04-03 RX ADMIN — Medication 1 GRAM(S): at 23:17

## 2019-04-03 RX ADMIN — Medication 1 GRAM(S): at 17:14

## 2019-04-03 RX ADMIN — TRAMADOL HYDROCHLORIDE 50 MILLIGRAM(S): 50 TABLET ORAL at 17:16

## 2019-04-03 RX ADMIN — Medication 200 MILLIGRAM(S): at 11:54

## 2019-04-03 RX ADMIN — Medication 500 MILLIGRAM(S): at 17:14

## 2019-04-03 RX ADMIN — Medication 10 UNIT(S): at 12:35

## 2019-04-03 RX ADMIN — OXYCODONE HYDROCHLORIDE 10 MILLIGRAM(S): 5 TABLET ORAL at 18:09

## 2019-04-03 RX ADMIN — Medication 20 MILLIGRAM(S): at 21:26

## 2019-04-03 RX ADMIN — Medication 1 GRAM(S): at 11:53

## 2019-04-03 RX ADMIN — POLYETHYLENE GLYCOL 3350 17 GRAM(S): 17 POWDER, FOR SOLUTION ORAL at 17:15

## 2019-04-03 RX ADMIN — Medication 200 MILLIGRAM(S): at 17:14

## 2019-04-03 RX ADMIN — Medication 10 UNIT(S): at 17:17

## 2019-04-03 RX ADMIN — OXYCODONE HYDROCHLORIDE 10 MILLIGRAM(S): 5 TABLET ORAL at 11:53

## 2019-04-03 RX ADMIN — Medication 200 MILLIGRAM(S): at 06:09

## 2019-04-03 RX ADMIN — TRAMADOL HYDROCHLORIDE 50 MILLIGRAM(S): 50 TABLET ORAL at 07:00

## 2019-04-03 RX ADMIN — NYSTATIN CREAM 1 APPLICATION(S): 100000 CREAM TOPICAL at 06:11

## 2019-04-03 RX ADMIN — PANTOPRAZOLE SODIUM 40 MILLIGRAM(S): 20 TABLET, DELAYED RELEASE ORAL at 17:15

## 2019-04-03 RX ADMIN — Medication 137 MICROGRAM(S): at 06:10

## 2019-04-03 RX ADMIN — NYSTATIN CREAM 1 APPLICATION(S): 100000 CREAM TOPICAL at 17:16

## 2019-04-03 RX ADMIN — OXYCODONE HYDROCHLORIDE 10 MILLIGRAM(S): 5 TABLET ORAL at 01:26

## 2019-04-03 RX ADMIN — OXYCODONE HYDROCHLORIDE 10 MILLIGRAM(S): 5 TABLET ORAL at 06:09

## 2019-04-03 RX ADMIN — OXYCODONE HYDROCHLORIDE 10 MILLIGRAM(S): 5 TABLET ORAL at 18:32

## 2019-04-03 RX ADMIN — SENNA PLUS 2 TABLET(S): 8.6 TABLET ORAL at 21:25

## 2019-04-03 RX ADMIN — BUDESONIDE AND FORMOTEROL FUMARATE DIHYDRATE 2 PUFF(S): 160; 4.5 AEROSOL RESPIRATORY (INHALATION) at 06:11

## 2019-04-03 RX ADMIN — OXYCODONE HYDROCHLORIDE 10 MILLIGRAM(S): 5 TABLET ORAL at 22:39

## 2019-04-03 RX ADMIN — FENTANYL CITRATE 1 PATCH: 50 INJECTION INTRAVENOUS at 19:07

## 2019-04-03 RX ADMIN — Medication 3 MILLILITER(S): at 23:17

## 2019-04-03 RX ADMIN — FENTANYL CITRATE 1 PATCH: 50 INJECTION INTRAVENOUS at 07:14

## 2019-04-03 RX ADMIN — OXYCODONE HYDROCHLORIDE 10 MILLIGRAM(S): 5 TABLET ORAL at 12:13

## 2019-04-03 RX ADMIN — SIMVASTATIN 20 MILLIGRAM(S): 20 TABLET, FILM COATED ORAL at 21:25

## 2019-04-03 RX ADMIN — OXYCODONE HYDROCHLORIDE 10 MILLIGRAM(S): 5 TABLET ORAL at 23:20

## 2019-04-03 RX ADMIN — OXYCODONE HYDROCHLORIDE 10 MILLIGRAM(S): 5 TABLET ORAL at 06:14

## 2019-04-03 RX ADMIN — Medication 20 MILLIGRAM(S): at 13:01

## 2019-04-03 RX ADMIN — Medication 200 MILLIGRAM(S): at 23:17

## 2019-04-03 RX ADMIN — Medication 500 MILLIGRAM(S): at 17:45

## 2019-04-03 RX ADMIN — Medication 10 UNIT(S): at 09:33

## 2019-04-03 RX ADMIN — PANTOPRAZOLE SODIUM 40 MILLIGRAM(S): 20 TABLET, DELAYED RELEASE ORAL at 06:10

## 2019-04-03 RX ADMIN — OXYCODONE HYDROCHLORIDE 10 MILLIGRAM(S): 5 TABLET ORAL at 07:00

## 2019-04-03 RX ADMIN — Medication 100 MILLIGRAM(S): at 21:25

## 2019-04-03 RX ADMIN — Medication 2: at 12:34

## 2019-04-03 RX ADMIN — Medication 40 MILLIGRAM(S): at 17:16

## 2019-04-03 RX ADMIN — Medication 1 GRAM(S): at 06:10

## 2019-04-03 RX ADMIN — ENOXAPARIN SODIUM 40 MILLIGRAM(S): 100 INJECTION SUBCUTANEOUS at 13:01

## 2019-04-03 RX ADMIN — GABAPENTIN 400 MILLIGRAM(S): 400 CAPSULE ORAL at 13:01

## 2019-04-03 RX ADMIN — Medication 100 MILLIGRAM(S): at 13:01

## 2019-04-03 RX ADMIN — BUDESONIDE AND FORMOTEROL FUMARATE DIHYDRATE 2 PUFF(S): 160; 4.5 AEROSOL RESPIRATORY (INHALATION) at 17:15

## 2019-04-03 RX ADMIN — Medication 3 MILLILITER(S): at 17:14

## 2019-04-03 RX ADMIN — Medication 100 MILLIGRAM(S): at 06:10

## 2019-04-03 RX ADMIN — TRAMADOL HYDROCHLORIDE 50 MILLIGRAM(S): 50 TABLET ORAL at 06:14

## 2019-04-03 RX ADMIN — GABAPENTIN 300 MILLIGRAM(S): 400 CAPSULE ORAL at 06:10

## 2019-04-03 RX ADMIN — INSULIN GLARGINE 20 UNIT(S): 100 INJECTION, SOLUTION SUBCUTANEOUS at 21:28

## 2019-04-03 RX ADMIN — Medication 40 MILLIGRAM(S): at 06:14

## 2019-04-03 RX ADMIN — Medication 20 MILLIGRAM(S): at 06:09

## 2019-04-03 RX ADMIN — PRIMIDONE 50 MILLIGRAM(S): 250 TABLET ORAL at 11:54

## 2019-04-03 RX ADMIN — GABAPENTIN 400 MILLIGRAM(S): 400 CAPSULE ORAL at 21:25

## 2019-04-03 RX ADMIN — TRAMADOL HYDROCHLORIDE 50 MILLIGRAM(S): 50 TABLET ORAL at 17:45

## 2019-04-03 RX ADMIN — Medication 3 MILLILITER(S): at 11:54

## 2019-04-03 NOTE — PROGRESS NOTE ADULT - ATTENDING COMMENTS
pt seems stable from pulm point of view: finished HAP  rx: on bipap at night: She is alert and awake: She would need follow up ct chest in 6 -8 weeks time: DC Planning:  3/31: PT IS STABLE FROM PULMONARY POINT OF VIEW: FINISHED ANTIBIOTICS: TOLERATING BIPAP  4/1: no need for  sinuses ct inpt: Her breathing has been good: No wheezing: cont bd: As wellas bipap at night!  4/5: stable  4/3: DC planning:

## 2019-04-03 NOTE — PROGRESS NOTE ADULT - SUBJECTIVE AND OBJECTIVE BOX
pt seen and examined,  no  chest pain , ROS     ALBUTerol/ipratropium for Nebulization 3 milliLiter(s) Nebulizer every 6 hours  buDESOnide  80 MICROgram(s)/formoterol 4.5 MICROgram(s) Inhaler 2 Puff(s) Inhalation two times a day  dextrose 40% Gel 15 Gram(s) Oral once PRN  dextrose 5%. 1000 milliLiter(s) IV Continuous <Continuous>  dextrose 50% Injectable 12.5 Gram(s) IV Push once  dextrose 50% Injectable 25 Gram(s) IV Push once  dextrose 50% Injectable 25 Gram(s) IV Push once  docusate sodium 100 milliGRAM(s) Oral three times a day  enoxaparin Injectable 40 milliGRAM(s) SubCutaneous every 24 hours  fentaNYL   Patch  25 MICROgram(s)/Hr 1 Patch Transdermal every 72 hours  furosemide    Tablet 40 milliGRAM(s) Oral two times a day  gabapentin 300 milliGRAM(s) Oral three times a day  glucagon  Injectable 1 milliGRAM(s) IntraMuscular once PRN  guaiFENesin   Syrup  (Sugar-Free) 200 milliGRAM(s) Oral every 6 hours  influenza   Vaccine 0.5 milliLiter(s) IntraMuscular once  insulin glargine Injectable (LANTUS) 20 Unit(s) SubCutaneous at bedtime  insulin lispro (HumaLOG) corrective regimen sliding scale   SubCutaneous three times a day before meals  insulin lispro Injectable (HumaLOG) 10 Unit(s) SubCutaneous three times a day before meals  levothyroxine 137 MICROGram(s) Oral daily  nystatin Powder 1 Application(s) Topical two times a day  ondansetron Injectable 4 milliGRAM(s) IV Push every 6 hours PRN  oxyCODONE    IR 10 milliGRAM(s) Oral every 4 hours PRN  pantoprazole    Tablet 40 milliGRAM(s) Oral two times a day  polyethylene glycol 3350 17 Gram(s) Oral two times a day  predniSONE   Tablet 10 milliGRAM(s) Oral daily  primidone 50 milliGRAM(s) Oral daily  senna 2 Tablet(s) Oral at bedtime  sildenafil (REVATIO) 20 milliGRAM(s) Oral three times a day  simvastatin 20 milliGRAM(s) Oral at bedtime  sodium chloride 0.65% Nasal 1 Spray(s) Both Nostrils every 4 hours PRN  sucralfate 1 Gram(s) Oral four times a day  traMADol 50 milliGRAM(s) Oral two times a day          Hemoglobin: 10.3 g/dL (03-29 @ 09:31)            Creatinine Trend: 0.71<--, 0.76<--, 0.86<--, 0.97<--, 1.00<--, 1.02<--    COAGS:           T(C): 37.1 (04-02-19 @ 23:27), Max: 37.1 (04-02-19 @ 23:27)  HR: 79 (04-03-19 @ 06:43) (79 - 99)  BP: 136/75 (04-02-19 @ 23:27) (136/75 - 166/70)  RR: 18 (04-02-19 @ 23:27) (18 - 18)  SpO2: 97% (04-03-19 @ 06:43) (95% - 99%)  Wt(kg): --    I&O's Summary    01 Apr 2019 07:01  -  02 Apr 2019 07:00  --------------------------------------------------------  IN: 220 mL / OUT: 0 mL / NET: 220 mL    02 Apr 2019 07:01  -  03 Apr 2019 06:57  --------------------------------------------------------  IN: 860 mL / OUT: 0 mL / NET: 860 mL          Gen: Appears well in NAD  HEENT:  (-)icterus (-)pallor  CV: N S1 S2 1/6 OJ (+)2 Pulses B/l  Resp:  Clear to ausculatation B/L, normal effort  GI: (+) BS Soft, NT, ND  Lymph:  (-)Edema, (-)obvious lymphadenopathy  Skin: Warm to touch, Normal turgor  Psych: Appropriate mood and affect      ECG:  	sinus Tachycardia 109 BPM, nonspecific T wave abnormality    RADIOLOGY:         CXR:  < from: Xray Chest 1 View- PORTABLE-Urgent (03.21.19 @ 11:18) >  The heart is enlarged. Improvingopacities in both lungs when compared to   previous study done on March 6, 2019. Those changes are compatible with   interstitial fluid however an infectious process cannot be ruled out   entirely. Severe degenerative changes of the right shoulder. Degenerative   changes of the thoracic spine is evident as well.    < end of copied text >      ECHO: pending < from: Transthoracic Echocardiogram (03.26.19 @ 10:29) >  Conclusions:  1. Increased relative wall thickness with normal left  ventricular mass index, consistent with concentric left  ventricular remodeling.  2. Hyperdynamic left ventricular systolic function.  3. Estimated pulmonary artery systolic pressure equals 42  mm Hg, assuming right atrial pressureequals 8 mm Hg,  consistent with mild pulmonary pressures.    < end of copied text >      ASSESSMENT/PLAN: 	63y Female  HTN, DM2, COPD/CHRIS on nocturnal CPAP, normal LV function un echo 1/19 moderate pulmonary HTN discharged yesterday from [facility] after a prolonged hospitalization for SBO requiring lysis of adhesions, complicated by hypercapneic respiratory failure requiring intubation now being treated for HCAP..    - tolerating diuresis , keep on dry side , cont lasix   - 3/26 ECHO ,   hyperdynamic LV fx   - no further CV work up needed at present  -  cont All meds for pulm htn    -  GI / DVT prophylaxis,  keep K>4, mag >2.0  - Pulm follow up   - D/C planning   D/W Dr Joseph

## 2019-04-03 NOTE — PROGRESS NOTE ADULT - SUBJECTIVE AND OBJECTIVE BOX
S: Patient with multiple concerns, states that she has not seen a doctor or nurse since December, and feels that no one know what is happening with her care. Patient requested that tape not be applied to the darker brown skin, where it pulls on her skin and dry bits come off. She reports eating normally, and passing flatus, not sure of her last bowel movement. No nausea, fevers, chills, chest pain or difficulty breathing.    O: Vital Signs  T(C): 36.9 (04-03 @ 07:30), Max: 37.1 (04-02 @ 23:27)  HR: 91 (04-03 @ 09:00) (79 - 99)  BP: 123/64 (04-03 @ 07:30) (123/64 - 145/74)  RR: 18 (04-03 @ 07:30) (18 - 18)  SpO2: 94% (04-03 @ 09:00) (94% - 99%)  04-02-19 @ 07:01  -  04-03-19 @ 07:00  --------------------------------------------------------  IN: 860 mL / OUT: 0 mL / NET: 860 mL      General: alert and oriented, NAD  Resp: airway patent, respirations unlabored  Abdomen: soft, nontender, nondistended, no clinically appreciable hernias  Incision: previous midline incision with pink granulation tissue, covered with adaptec, healing well, no purulence    04-03    140  |  97  |  26<H>  ----------------------------<  110<H>  4.0   |  25  |  0.74    Ca    9.8      03 Apr 2019 07:14

## 2019-04-03 NOTE — PROGRESS NOTE ADULT - ASSESSMENT
63 year old female with previous exploratory laparotomy, complicated by evisceration, presently admitted with respiratory failure; surgery consulted to assess wound and possible hernia.    - CT scan of abdomen and pelvis pending to assess for hernia  - patient without obstructive symptoms, otherwise feeling well, no urgent surgical intervention warranted  - appreciate current wound care management, no further recommendations per surgery    --RIGO Stewart, [id number]

## 2019-04-03 NOTE — PROGRESS NOTE ADULT - SUBJECTIVE AND OBJECTIVE BOX
Patient is a 63y old  Female who presents with a chief complaint of sob (03 Apr 2019 11:58)      INTERVAL HPI/OVERNIGHT EVENTS:  T(C): 36.4 (04-03-19 @ 16:13), Max: 37.1 (04-02-19 @ 23:27)  HR: 97 (04-03-19 @ 16:13) (79 - 97)  BP: 139/83 (04-03-19 @ 16:13) (123/64 - 139/83)  RR: 18 (04-03-19 @ 16:13) (18 - 18)  SpO2: 99% (04-03-19 @ 16:13) (94% - 99%)  Wt(kg): --  I&O's Summary    02 Apr 2019 07:01  -  03 Apr 2019 07:00  --------------------------------------------------------  IN: 860 mL / OUT: 0 mL / NET: 860 mL    03 Apr 2019 07:01  -  03 Apr 2019 17:56  --------------------------------------------------------  IN: 600 mL / OUT: 250 mL / NET: 350 mL        LABS:    04-03    140  |  97  |  26<H>  ----------------------------<  110<H>  4.0   |  25  |  0.74    Ca    9.8      03 Apr 2019 07:14          CAPILLARY BLOOD GLUCOSE      POCT Blood Glucose.: 122 mg/dL (03 Apr 2019 17:13)  POCT Blood Glucose.: 210 mg/dL (03 Apr 2019 12:18)  POCT Blood Glucose.: 148 mg/dL (03 Apr 2019 08:51)  POCT Blood Glucose.: 111 mg/dL (02 Apr 2019 21:27)            MEDICATIONS  (STANDING):  ALBUTerol/ipratropium for Nebulization 3 milliLiter(s) Nebulizer every 6 hours  buDESOnide  80 MICROgram(s)/formoterol 4.5 MICROgram(s) Inhaler 2 Puff(s) Inhalation two times a day  dextrose 5%. 1000 milliLiter(s) (50 mL/Hr) IV Continuous <Continuous>  dextrose 50% Injectable 12.5 Gram(s) IV Push once  dextrose 50% Injectable 25 Gram(s) IV Push once  dextrose 50% Injectable 25 Gram(s) IV Push once  docusate sodium 100 milliGRAM(s) Oral three times a day  enoxaparin Injectable 40 milliGRAM(s) SubCutaneous every 24 hours  fentaNYL   Patch  25 MICROgram(s)/Hr 1 Patch Transdermal every 72 hours  furosemide    Tablet 40 milliGRAM(s) Oral two times a day  gabapentin 400 milliGRAM(s) Oral three times a day  guaiFENesin   Syrup  (Sugar-Free) 200 milliGRAM(s) Oral every 6 hours  influenza   Vaccine 0.5 milliLiter(s) IntraMuscular once  insulin glargine Injectable (LANTUS) 20 Unit(s) SubCutaneous at bedtime  insulin lispro (HumaLOG) corrective regimen sliding scale   SubCutaneous three times a day before meals  insulin lispro Injectable (HumaLOG) 10 Unit(s) SubCutaneous three times a day before meals  levothyroxine 137 MICROGram(s) Oral daily  naproxen 500 milliGRAM(s) Oral every 12 hours  nystatin Powder 1 Application(s) Topical two times a day  pantoprazole    Tablet 40 milliGRAM(s) Oral two times a day  polyethylene glycol 3350 17 Gram(s) Oral two times a day  predniSONE   Tablet 10 milliGRAM(s) Oral daily  primidone 50 milliGRAM(s) Oral daily  senna 2 Tablet(s) Oral at bedtime  sildenafil (REVATIO) 20 milliGRAM(s) Oral three times a day  simvastatin 20 milliGRAM(s) Oral at bedtime  sucralfate 1 Gram(s) Oral four times a day  traMADol 50 milliGRAM(s) Oral two times a day    MEDICATIONS  (PRN):  dextrose 40% Gel 15 Gram(s) Oral once PRN Blood Glucose LESS THAN 70 milliGRAM(s)/deciliter  glucagon  Injectable 1 milliGRAM(s) IntraMuscular once PRN Glucose LESS THAN 70 milligrams/deciliter  ondansetron Injectable 4 milliGRAM(s) IV Push every 6 hours PRN Nausea  oxyCODONE    IR 10 milliGRAM(s) Oral every 4 hours PRN Moderate Pain (4 - 6)  sodium chloride 0.65% Nasal 1 Spray(s) Both Nostrils every 4 hours PRN Nasal Congestion          PHYSICAL EXAM:  GENERAL: moon face  CHEST/LUNG: Clear to percussion bilaterally; No rales, rhonchi, wheezing, or rubs  HEART: Regular rate and rhythm; No murmurs, rubs, or gallops  ABDOMEN: Soft, Nontender, Nondistended; Bowel sounds present  EXTREMITIES:  R foot drop    Care Discussed with Consultants/Other Providers [ *] YES  [ ] NO

## 2019-04-03 NOTE — PROGRESS NOTE ADULT - ATTENDING COMMENTS
Patient seen and examined.  Agree with above.   -TTE with normal LV function  -No further inpatient cardiac workup needed at this time.     Richard Pineda MD

## 2019-04-03 NOTE — PROGRESS NOTE ADULT - SUBJECTIVE AND OBJECTIVE BOX
Patient is a 63y old  Female who presents with a chief complaint of sob (03 Apr 2019 06:56)      Any change in ROS: Breathign swenson he Essence seems to be ok: sHE SAYS HER BREATHING IS BETTER: USING BIPAP AT NIGHT!~    MEDICATIONS  (STANDING):  ALBUTerol/ipratropium for Nebulization 3 milliLiter(s) Nebulizer every 6 hours  buDESOnide  80 MICROgram(s)/formoterol 4.5 MICROgram(s) Inhaler 2 Puff(s) Inhalation two times a day  dextrose 5%. 1000 milliLiter(s) (50 mL/Hr) IV Continuous <Continuous>  dextrose 50% Injectable 12.5 Gram(s) IV Push once  dextrose 50% Injectable 25 Gram(s) IV Push once  dextrose 50% Injectable 25 Gram(s) IV Push once  docusate sodium 100 milliGRAM(s) Oral three times a day  enoxaparin Injectable 40 milliGRAM(s) SubCutaneous every 24 hours  fentaNYL   Patch  25 MICROgram(s)/Hr 1 Patch Transdermal every 72 hours  furosemide    Tablet 40 milliGRAM(s) Oral two times a day  gabapentin 400 milliGRAM(s) Oral three times a day  guaiFENesin   Syrup  (Sugar-Free) 200 milliGRAM(s) Oral every 6 hours  influenza   Vaccine 0.5 milliLiter(s) IntraMuscular once  insulin glargine Injectable (LANTUS) 20 Unit(s) SubCutaneous at bedtime  insulin lispro (HumaLOG) corrective regimen sliding scale   SubCutaneous three times a day before meals  insulin lispro Injectable (HumaLOG) 10 Unit(s) SubCutaneous three times a day before meals  levothyroxine 137 MICROGram(s) Oral daily  naproxen 500 milliGRAM(s) Oral every 12 hours  nystatin Powder 1 Application(s) Topical two times a day  pantoprazole    Tablet 40 milliGRAM(s) Oral two times a day  polyethylene glycol 3350 17 Gram(s) Oral two times a day  predniSONE   Tablet 10 milliGRAM(s) Oral daily  primidone 50 milliGRAM(s) Oral daily  senna 2 Tablet(s) Oral at bedtime  sildenafil (REVATIO) 20 milliGRAM(s) Oral three times a day  simvastatin 20 milliGRAM(s) Oral at bedtime  sucralfate 1 Gram(s) Oral four times a day  traMADol 50 milliGRAM(s) Oral two times a day    MEDICATIONS  (PRN):  dextrose 40% Gel 15 Gram(s) Oral once PRN Blood Glucose LESS THAN 70 milliGRAM(s)/deciliter  glucagon  Injectable 1 milliGRAM(s) IntraMuscular once PRN Glucose LESS THAN 70 milligrams/deciliter  ondansetron Injectable 4 milliGRAM(s) IV Push every 6 hours PRN Nausea  oxyCODONE    IR 10 milliGRAM(s) Oral every 4 hours PRN Moderate Pain (4 - 6)  sodium chloride 0.65% Nasal 1 Spray(s) Both Nostrils every 4 hours PRN Nasal Congestion    Vital Signs Last 24 Hrs  T(C): 36.9 (03 Apr 2019 07:30), Max: 37.1 (02 Apr 2019 23:27)  T(F): 98.5 (03 Apr 2019 07:30), Max: 98.7 (02 Apr 2019 23:27)  HR: 91 (03 Apr 2019 09:00) (79 - 99)  BP: 123/64 (03 Apr 2019 07:30) (123/64 - 145/74)  BP(mean): --  RR: 18 (03 Apr 2019 07:30) (18 - 18)  SpO2: 94% (03 Apr 2019 09:00) (94% - 99%)    I&O's Summary    02 Apr 2019 07:01  -  03 Apr 2019 07:00  --------------------------------------------------------  IN: 860 mL / OUT: 0 mL / NET: 860 mL          Physical Exam:   GENERAL: Obese  HEENT: KWAME/   Atraumatic, Normocephalic  ENMT: No tonsillar erythema, exudates, or enlargement; Moist mucous membranes, Good dentition, No lesions  NECK: Supple, No JVD, Normal thyroid  CHEST/LUNG: Clear to auscultaion  CVS: Regular rate and rhythm; No murmurs, rubs, or gallops  GI: : Soft, Nontender, Nondistended; Bowel sounds present  NERVOUS SYSTEM:  Alert & Oriented X3  EXTREMITIES: - edema  LYMPH: No lymphadenopathy noted  SKIN: No rashes or lesions  ENDOCRINOLOGY: No Thyromegaly  PSYCH: Appropriate    Labs:          04-03    140  |  97  |  26<H>  ----------------------------<  110<H>  4.0   |  25  |  0.74    Ca    9.8      03 Apr 2019 07:14      CAPILLARY BLOOD GLUCOSE      POCT Blood Glucose.: 148 mg/dL (03 Apr 2019 08:51)  POCT Blood Glucose.: 111 mg/dL (02 Apr 2019 21:27)  POCT Blood Glucose.: 131 mg/dL (02 Apr 2019 17:11)  POCT Blood Glucose.: 187 mg/dL (02 Apr 2019 12:16)          < from: CT Maxillofacial No Cont (04.02.19 @ 17:26) >      PROCEDURE DATE:  04/02/2019            INTERPRETATION:  Noncontrast CT examination of the maxillofacial bones    CLINICAL INDICATION: chronic sinuisitis. Green mucus coming from nose    TECHNIQUE: Direct axial CT scanning of the maxillofacial bones was   obtained without the intravenous administration of contrast. Images were   reformatted in the sagittal and coronal planes to evaluate osseous   structures.     COMPARISON: CT brain 3/30/2019    FINDINGS:     Paranasal sinuses are well-developed and the bony walls surrounding them   are intact. Paranasal sinuses are clear. The bilateral ostiomeatal units,   frontoethmoidal recesses, and sphenoethmoid recesses are patent.    The bony nasal septum is essentially midline. No masses or polyps seen   within the nasal passages. The bilateral inferior turbinate soft tissues   contact the septal soft tissues.    The globes, extraocular muscles, retrobulbar fat, and optic nerves are   unremarkable. No radiopaque foreign body in the orbits.    Bilateral mastoid air cell effusions are noted.    IMPRESSION:    Paranasal sinuses clear.                     MER CASTILLO M.D., ATTENDING RADIOLOGIST  This document has been electronically signed. Apr 22019  5:12PM        < end of copied text >          RECENT CULTURES:        RESPIRATORY CULTURES:          Studies  Chest X-RAY  CT SCAN Chest   Venous Dopplers: LE:   CT Abdomen  Others

## 2019-04-03 NOTE — PROGRESS NOTE ADULT - ASSESSMENT
Problem/Plan - 1:  ·  Problem: HAP (hospital-acquired pneumonia).  Plan: finished antibiotics  id and pulmonary fu  will monitor.     Problem/Plan - 2:  ·  Problem: Chronic hypoxic respiratory failure , Oxygen dependent.  Plan: bipap at night   pulmonary fu.   no need for CT sinus as inpt     Problem/Plan - 3:  ·  Problem: abdominal pain Plan: wound care consult  check CT scan    Problem/Plan - 4:  ·  Problem: foot drop.  Plan: rheum fu appreciated  chronic   no inpt [facility] as per neuro

## 2019-04-03 NOTE — PROGRESS NOTE ADULT - SUBJECTIVE AND OBJECTIVE BOX
Interval Events; No new overnight event.  No N/V/D.  Tolerating diet.  epigastric pain has resolved  f/u CT a/p to evaluate hernia     MEDICATIONS  (STANDING):  ALBUTerol/ipratropium for Nebulization 3 milliLiter(s) Nebulizer every 6 hours  buDESOnide  80 MICROgram(s)/formoterol 4.5 MICROgram(s) Inhaler 2 Puff(s) Inhalation two times a day  dextrose 5%. 1000 milliLiter(s) (50 mL/Hr) IV Continuous <Continuous>  dextrose 50% Injectable 12.5 Gram(s) IV Push once  dextrose 50% Injectable 25 Gram(s) IV Push once  dextrose 50% Injectable 25 Gram(s) IV Push once  docusate sodium 100 milliGRAM(s) Oral three times a day  enoxaparin Injectable 40 milliGRAM(s) SubCutaneous every 24 hours  fentaNYL   Patch  25 MICROgram(s)/Hr 1 Patch Transdermal every 72 hours  furosemide    Tablet 40 milliGRAM(s) Oral two times a day  gabapentin 400 milliGRAM(s) Oral three times a day  guaiFENesin   Syrup  (Sugar-Free) 200 milliGRAM(s) Oral every 6 hours  influenza   Vaccine 0.5 milliLiter(s) IntraMuscular once  insulin glargine Injectable (LANTUS) 20 Unit(s) SubCutaneous at bedtime  insulin lispro (HumaLOG) corrective regimen sliding scale   SubCutaneous three times a day before meals  insulin lispro Injectable (HumaLOG) 10 Unit(s) SubCutaneous three times a day before meals  levothyroxine 137 MICROGram(s) Oral daily  naproxen 500 milliGRAM(s) Oral every 12 hours  nystatin Powder 1 Application(s) Topical two times a day  pantoprazole    Tablet 40 milliGRAM(s) Oral two times a day  polyethylene glycol 3350 17 Gram(s) Oral two times a day  predniSONE   Tablet 10 milliGRAM(s) Oral daily  primidone 50 milliGRAM(s) Oral daily  senna 2 Tablet(s) Oral at bedtime  sildenafil (REVATIO) 20 milliGRAM(s) Oral three times a day  simvastatin 20 milliGRAM(s) Oral at bedtime  sucralfate 1 Gram(s) Oral four times a day  traMADol 50 milliGRAM(s) Oral two times a day    MEDICATIONS  (PRN):  dextrose 40% Gel 15 Gram(s) Oral once PRN Blood Glucose LESS THAN 70 milliGRAM(s)/deciliter  glucagon  Injectable 1 milliGRAM(s) IntraMuscular once PRN Glucose LESS THAN 70 milligrams/deciliter  ondansetron Injectable 4 milliGRAM(s) IV Push every 6 hours PRN Nausea  oxyCODONE    IR 10 milliGRAM(s) Oral every 4 hours PRN Moderate Pain (4 - 6)  sodium chloride 0.65% Nasal 1 Spray(s) Both Nostrils every 4 hours PRN Nasal Congestion      Allergies    No Known Allergies    Intolerances    Seroquel (Other)      Review of Systems:    General:  No wt loss, fevers, chills, night sweats,fatigue,   Eyes:  Good vision, no reported pain  ENT:  No sore throat, pain, runny nose, dysphagia  CV:  No pain, palpitations, hypo/hypertension  Resp:  No dyspnea, cough, tachypnea, wheezing  GI:  No pain, No nausea, No vomiting, No diarrhea, No constipation, No weight loss, No fever, No pruritis, No rectal bleeding, No melena, No dysphagia  :  No pain, bleeding, incontinence, nocturia  Muscle:  No pain, weakness  Neuro:  No weakness, tingling, memory problems  Psych:  No fatigue, insomnia, mood problems, depression  Endocrine:  No polyuria, polydypsia, cold/heat intolerance  Heme:  No petechiae, ecchymosis, easy bruisability  Skin:  No rash, tattoos, scars, edema      Vital Signs Last 24 Hrs  T(C): 36.9 (03 Apr 2019 07:30), Max: 37.1 (02 Apr 2019 23:27)  T(F): 98.5 (03 Apr 2019 07:30), Max: 98.7 (02 Apr 2019 23:27)  HR: 91 (03 Apr 2019 09:00) (79 - 95)  BP: 123/64 (03 Apr 2019 07:30) (123/64 - 145/74)  BP(mean): --  RR: 18 (03 Apr 2019 07:30) (18 - 18)  SpO2: 94% (03 Apr 2019 09:00) (94% - 99%)    PHYSICAL EXAM:    GENERAL:  Appears stated age, well-groomed, well-nourished, no distress  HEENT:  NC/AT,  conjunctivae clear and pink, no thyromegaly, nodules, adenopathy, no JVD, sclera -anicteric  CHEST:  Full & symmetric excursion, no increased effort, breath sounds clear  HEART:  Regular rhythm, S1, S2, no murmur/rub/S3/S4, no abdominal bruit, no edema  ABDOMEN:  Soft, non-tender, non-distended,+ abdominal incision dressed-- c/d/i   EXTREMITIES:  no cyanosis, clubbing or edema  SKIN:  No rash/erythema/ecchymoses/petechiae/wounds/abscess/warm/dry  NEURO:  Alert, oriented, no asterixis, no tremor, no encephalopathy        LABS:    04-03    140  |  97  |  26<H>  ----------------------------<  110<H>  4.0   |  25  |  0.74    Ca    9.8      03 Apr 2019 07:14            RADIOLOGY & ADDITIONAL TESTS:

## 2019-04-04 DIAGNOSIS — K46.9 UNSPECIFIED ABDOMINAL HERNIA WITHOUT OBSTRUCTION OR GANGRENE: ICD-10-CM

## 2019-04-04 LAB
GLUCOSE BLDC GLUCOMTR-MCNC: 128 MG/DL — HIGH (ref 70–99)
GLUCOSE BLDC GLUCOMTR-MCNC: 171 MG/DL — HIGH (ref 70–99)
GLUCOSE BLDC GLUCOMTR-MCNC: 183 MG/DL — HIGH (ref 70–99)
GLUCOSE BLDC GLUCOMTR-MCNC: 87 MG/DL — SIGNIFICANT CHANGE UP (ref 70–99)

## 2019-04-04 PROCEDURE — 72141 MRI NECK SPINE W/O DYE: CPT | Mod: 26

## 2019-04-04 PROCEDURE — 99231 SBSQ HOSP IP/OBS SF/LOW 25: CPT

## 2019-04-04 RX ORDER — TRAMADOL HYDROCHLORIDE 50 MG/1
50 TABLET ORAL
Qty: 0 | Refills: 0 | Status: DISCONTINUED | OUTPATIENT
Start: 2019-04-05 | End: 2019-04-09

## 2019-04-04 RX ADMIN — FENTANYL CITRATE 1 PATCH: 50 INJECTION INTRAVENOUS at 19:49

## 2019-04-04 RX ADMIN — Medication 200 MILLIGRAM(S): at 05:43

## 2019-04-04 RX ADMIN — BUDESONIDE AND FORMOTEROL FUMARATE DIHYDRATE 2 PUFF(S): 160; 4.5 AEROSOL RESPIRATORY (INHALATION) at 05:43

## 2019-04-04 RX ADMIN — OXYCODONE HYDROCHLORIDE 10 MILLIGRAM(S): 5 TABLET ORAL at 22:06

## 2019-04-04 RX ADMIN — Medication 10 MILLIGRAM(S): at 05:44

## 2019-04-04 RX ADMIN — Medication 3 MILLILITER(S): at 12:56

## 2019-04-04 RX ADMIN — OXYCODONE HYDROCHLORIDE 10 MILLIGRAM(S): 5 TABLET ORAL at 23:55

## 2019-04-04 RX ADMIN — Medication 1: at 12:55

## 2019-04-04 RX ADMIN — Medication 500 MILLIGRAM(S): at 17:34

## 2019-04-04 RX ADMIN — Medication 10 UNIT(S): at 12:56

## 2019-04-04 RX ADMIN — PANTOPRAZOLE SODIUM 40 MILLIGRAM(S): 20 TABLET, DELAYED RELEASE ORAL at 05:44

## 2019-04-04 RX ADMIN — POLYETHYLENE GLYCOL 3350 17 GRAM(S): 17 POWDER, FOR SOLUTION ORAL at 05:46

## 2019-04-04 RX ADMIN — Medication 3 MILLILITER(S): at 23:55

## 2019-04-04 RX ADMIN — Medication 3 MILLILITER(S): at 17:35

## 2019-04-04 RX ADMIN — Medication 10 UNIT(S): at 17:36

## 2019-04-04 RX ADMIN — Medication 200 MILLIGRAM(S): at 12:55

## 2019-04-04 RX ADMIN — Medication 500 MILLIGRAM(S): at 05:43

## 2019-04-04 RX ADMIN — OXYCODONE HYDROCHLORIDE 10 MILLIGRAM(S): 5 TABLET ORAL at 13:25

## 2019-04-04 RX ADMIN — GABAPENTIN 400 MILLIGRAM(S): 400 CAPSULE ORAL at 22:08

## 2019-04-04 RX ADMIN — Medication 10 UNIT(S): at 09:09

## 2019-04-04 RX ADMIN — NYSTATIN CREAM 1 APPLICATION(S): 100000 CREAM TOPICAL at 17:37

## 2019-04-04 RX ADMIN — Medication 500 MILLIGRAM(S): at 06:34

## 2019-04-04 RX ADMIN — SIMVASTATIN 20 MILLIGRAM(S): 20 TABLET, FILM COATED ORAL at 22:08

## 2019-04-04 RX ADMIN — Medication 100 MILLIGRAM(S): at 05:44

## 2019-04-04 RX ADMIN — Medication 3 MILLILITER(S): at 05:42

## 2019-04-04 RX ADMIN — GABAPENTIN 400 MILLIGRAM(S): 400 CAPSULE ORAL at 05:44

## 2019-04-04 RX ADMIN — Medication 100 MILLIGRAM(S): at 12:57

## 2019-04-04 RX ADMIN — Medication 20 MILLIGRAM(S): at 12:58

## 2019-04-04 RX ADMIN — OXYCODONE HYDROCHLORIDE 10 MILLIGRAM(S): 5 TABLET ORAL at 12:56

## 2019-04-04 RX ADMIN — Medication 40 MILLIGRAM(S): at 05:43

## 2019-04-04 RX ADMIN — TRAMADOL HYDROCHLORIDE 50 MILLIGRAM(S): 50 TABLET ORAL at 06:34

## 2019-04-04 RX ADMIN — TRAMADOL HYDROCHLORIDE 50 MILLIGRAM(S): 50 TABLET ORAL at 06:29

## 2019-04-04 RX ADMIN — Medication 200 MILLIGRAM(S): at 17:35

## 2019-04-04 RX ADMIN — PRIMIDONE 50 MILLIGRAM(S): 250 TABLET ORAL at 12:56

## 2019-04-04 RX ADMIN — Medication 1 GRAM(S): at 17:35

## 2019-04-04 RX ADMIN — OXYCODONE HYDROCHLORIDE 10 MILLIGRAM(S): 5 TABLET ORAL at 19:31

## 2019-04-04 RX ADMIN — Medication 40 MILLIGRAM(S): at 17:35

## 2019-04-04 RX ADMIN — BUDESONIDE AND FORMOTEROL FUMARATE DIHYDRATE 2 PUFF(S): 160; 4.5 AEROSOL RESPIRATORY (INHALATION) at 17:36

## 2019-04-04 RX ADMIN — Medication 137 MICROGRAM(S): at 05:43

## 2019-04-04 RX ADMIN — OXYCODONE HYDROCHLORIDE 10 MILLIGRAM(S): 5 TABLET ORAL at 06:34

## 2019-04-04 RX ADMIN — OXYCODONE HYDROCHLORIDE 10 MILLIGRAM(S): 5 TABLET ORAL at 05:43

## 2019-04-04 RX ADMIN — Medication 1 GRAM(S): at 12:56

## 2019-04-04 RX ADMIN — FENTANYL CITRATE 1 PATCH: 50 INJECTION INTRAVENOUS at 06:53

## 2019-04-04 RX ADMIN — Medication 20 MILLIGRAM(S): at 22:08

## 2019-04-04 RX ADMIN — PANTOPRAZOLE SODIUM 40 MILLIGRAM(S): 20 TABLET, DELAYED RELEASE ORAL at 17:36

## 2019-04-04 RX ADMIN — TRAMADOL HYDROCHLORIDE 50 MILLIGRAM(S): 50 TABLET ORAL at 17:34

## 2019-04-04 RX ADMIN — GABAPENTIN 400 MILLIGRAM(S): 400 CAPSULE ORAL at 12:57

## 2019-04-04 RX ADMIN — INSULIN GLARGINE 20 UNIT(S): 100 INJECTION, SOLUTION SUBCUTANEOUS at 22:08

## 2019-04-04 RX ADMIN — Medication 1: at 09:08

## 2019-04-04 RX ADMIN — NYSTATIN CREAM 1 APPLICATION(S): 100000 CREAM TOPICAL at 05:45

## 2019-04-04 RX ADMIN — Medication 1 GRAM(S): at 05:44

## 2019-04-04 RX ADMIN — Medication 20 MILLIGRAM(S): at 05:43

## 2019-04-04 NOTE — PROGRESS NOTE ADULT - ATTENDING COMMENTS
pt seems stable from pulm point of view: finished HAP  rx: on bipap at night: She is alert and awake: She would need follow up ct chest in 6 -8 weeks time: DC Planning:  3/31: PT IS STABLE FROM PULMONARY POINT OF VIEW: FINISHED ANTIBIOTICS: TOLERATING BIPAP  : no need for  sinuses ct inpt: Her breathing has been good: No wheezing: cont bd: As wellas bipap at night!  : stable  4/3: DC plannin/4: new findings of abdominal hernia per ct scan abdomen: reps wise she is stable: no intervention from pulmonary point of view: cont current rx:

## 2019-04-04 NOTE — PROGRESS NOTE ADULT - SUBJECTIVE AND OBJECTIVE BOX
Surgery Progress Note    S: Patient seen and examined. No acute events overnight. patient very happy to be seen by surgery team. she states her pain is well controlled and she is tolerating a diet well. CT scan reviewed from yesterday showed Large diastases of the rectus abdominis muscle with herniation of small and large bowel into a large ventral incisional hernia    O:  Vital Signs Last 24 Hrs  T(C): 36.6 (04 Apr 2019 16:09), Max: 36.9 (04 Apr 2019 04:53)  T(F): 97.9 (04 Apr 2019 16:09), Max: 98.5 (04 Apr 2019 09:21)  HR: 104 (04 Apr 2019 16:09) (87 - 104)  BP: 132/74 (04 Apr 2019 16:09) (126/75 - 138/72)  BP(mean): --  RR: 18 (04 Apr 2019 16:09) (16 - 18)  SpO2: 100% (04 Apr 2019 16:09) (95% - 100%)    I&O's Detail    03 Apr 2019 07:01  -  04 Apr 2019 07:00  --------------------------------------------------------  IN:    Oral Fluid: 1160 mL  Total IN: 1160 mL    OUT:    Voided: 850 mL  Total OUT: 850 mL    Total NET: 310 mL      04 Apr 2019 07:01  -  04 Apr 2019 16:19  --------------------------------------------------------  IN:    Oral Fluid: 550 mL  Total IN: 550 mL    OUT:    Voided: 2 mL  Total OUT: 2 mL    Total NET: 548 mL          MEDICATIONS  (STANDING):  ALBUTerol/ipratropium for Nebulization 3 milliLiter(s) Nebulizer every 6 hours  buDESOnide  80 MICROgram(s)/formoterol 4.5 MICROgram(s) Inhaler 2 Puff(s) Inhalation two times a day  dextrose 5%. 1000 milliLiter(s) (50 mL/Hr) IV Continuous <Continuous>  dextrose 50% Injectable 12.5 Gram(s) IV Push once  dextrose 50% Injectable 25 Gram(s) IV Push once  dextrose 50% Injectable 25 Gram(s) IV Push once  docusate sodium 100 milliGRAM(s) Oral three times a day  enoxaparin Injectable 40 milliGRAM(s) SubCutaneous every 24 hours  fentaNYL   Patch  25 MICROgram(s)/Hr 1 Patch Transdermal every 72 hours  furosemide    Tablet 40 milliGRAM(s) Oral two times a day  gabapentin 400 milliGRAM(s) Oral three times a day  guaiFENesin   Syrup  (Sugar-Free) 200 milliGRAM(s) Oral every 6 hours  influenza   Vaccine 0.5 milliLiter(s) IntraMuscular once  insulin glargine Injectable (LANTUS) 20 Unit(s) SubCutaneous at bedtime  insulin lispro (HumaLOG) corrective regimen sliding scale   SubCutaneous three times a day before meals  insulin lispro Injectable (HumaLOG) 10 Unit(s) SubCutaneous three times a day before meals  levothyroxine 137 MICROGram(s) Oral daily  naproxen 500 milliGRAM(s) Oral every 12 hours  nystatin Powder 1 Application(s) Topical two times a day  pantoprazole    Tablet 40 milliGRAM(s) Oral two times a day  polyethylene glycol 3350 17 Gram(s) Oral two times a day  predniSONE   Tablet 10 milliGRAM(s) Oral daily  primidone 50 milliGRAM(s) Oral daily  senna 2 Tablet(s) Oral at bedtime  sildenafil (REVATIO) 20 milliGRAM(s) Oral three times a day  simvastatin 20 milliGRAM(s) Oral at bedtime  sucralfate 1 Gram(s) Oral four times a day  traMADol 50 milliGRAM(s) Oral two times a day    MEDICATIONS  (PRN):  dextrose 40% Gel 15 Gram(s) Oral once PRN Blood Glucose LESS THAN 70 milliGRAM(s)/deciliter  glucagon  Injectable 1 milliGRAM(s) IntraMuscular once PRN Glucose LESS THAN 70 milligrams/deciliter  ondansetron Injectable 4 milliGRAM(s) IV Push every 6 hours PRN Nausea  oxyCODONE    IR 10 milliGRAM(s) Oral every 4 hours PRN Moderate Pain (4 - 6)  sodium chloride 0.65% Nasal 1 Spray(s) Both Nostrils every 4 hours PRN Nasal Congestion          04-03    140  |  97  |  26<H>  ----------------------------<  110<H>  4.0   |  25  |  0.74    Ca    9.8      03 Apr 2019 07:14        Physical Exam:  Gen: Laying in bed, NAD  Resp: Unlabored breathing  Abd: soft, NTND, midline incision c/d/i, no rebound or guarding  Ext: WWP  Skin: No rashes

## 2019-04-04 NOTE — PROGRESS NOTE ADULT - ASSESSMENT
63 year old female with HTN, DM2, COPD/CHRIS on nocturnal CPAP, discharged 3/20 from [facility] after a prolonged hospitalization for SBO requiring lysis of adhesions, complicated by hypercapnic respiratory failure requiring intubation. GI consulted for anemia.

## 2019-04-04 NOTE — PROGRESS NOTE ADULT - SUBJECTIVE AND OBJECTIVE BOX
Interval Events: no new GI events/complaints  CT reviewed with Large diastases of the rectus abdominis muscle with herniation of small and large bowel into a large ventral incisional hernia.       MEDICATIONS  (STANDING):  ALBUTerol/ipratropium for Nebulization 3 milliLiter(s) Nebulizer every 6 hours  buDESOnide  80 MICROgram(s)/formoterol 4.5 MICROgram(s) Inhaler 2 Puff(s) Inhalation two times a day  dextrose 5%. 1000 milliLiter(s) (50 mL/Hr) IV Continuous <Continuous>  dextrose 50% Injectable 12.5 Gram(s) IV Push once  dextrose 50% Injectable 25 Gram(s) IV Push once  dextrose 50% Injectable 25 Gram(s) IV Push once  docusate sodium 100 milliGRAM(s) Oral three times a day  enoxaparin Injectable 40 milliGRAM(s) SubCutaneous every 24 hours  fentaNYL   Patch  25 MICROgram(s)/Hr 1 Patch Transdermal every 72 hours  furosemide    Tablet 40 milliGRAM(s) Oral two times a day  gabapentin 400 milliGRAM(s) Oral three times a day  guaiFENesin   Syrup  (Sugar-Free) 200 milliGRAM(s) Oral every 6 hours  influenza   Vaccine 0.5 milliLiter(s) IntraMuscular once  insulin glargine Injectable (LANTUS) 20 Unit(s) SubCutaneous at bedtime  insulin lispro (HumaLOG) corrective regimen sliding scale   SubCutaneous three times a day before meals  insulin lispro Injectable (HumaLOG) 10 Unit(s) SubCutaneous three times a day before meals  levothyroxine 137 MICROGram(s) Oral daily  naproxen 500 milliGRAM(s) Oral every 12 hours  nystatin Powder 1 Application(s) Topical two times a day  pantoprazole    Tablet 40 milliGRAM(s) Oral two times a day  polyethylene glycol 3350 17 Gram(s) Oral two times a day  predniSONE   Tablet 10 milliGRAM(s) Oral daily  primidone 50 milliGRAM(s) Oral daily  senna 2 Tablet(s) Oral at bedtime  sildenafil (REVATIO) 20 milliGRAM(s) Oral three times a day  simvastatin 20 milliGRAM(s) Oral at bedtime  sucralfate 1 Gram(s) Oral four times a day  traMADol 50 milliGRAM(s) Oral two times a day    MEDICATIONS  (PRN):  dextrose 40% Gel 15 Gram(s) Oral once PRN Blood Glucose LESS THAN 70 milliGRAM(s)/deciliter  glucagon  Injectable 1 milliGRAM(s) IntraMuscular once PRN Glucose LESS THAN 70 milligrams/deciliter  ondansetron Injectable 4 milliGRAM(s) IV Push every 6 hours PRN Nausea  oxyCODONE    IR 10 milliGRAM(s) Oral every 4 hours PRN Moderate Pain (4 - 6)  sodium chloride 0.65% Nasal 1 Spray(s) Both Nostrils every 4 hours PRN Nasal Congestion      Allergies    No Known Allergies    Intolerances    Seroquel (Other)      Review of Systems:    General:  No wt loss, fevers, chills, night sweats,fatigue,   Eyes:  Good vision, no reported pain  ENT:  No sore throat, pain, runny nose, dysphagia  CV:  No pain, palpitations, hypo/hypertension  Resp:  No dyspnea, cough, tachypnea, wheezing  GI:  No pain, No nausea, No vomiting, No diarrhea, No constipation, No weight loss, No fever, No pruritis, No rectal bleeding, No melena, No dysphagia  :  No pain, bleeding, incontinence, nocturia  Muscle:  No pain, weakness  Neuro:  No weakness, tingling, memory problems  Psych:  No fatigue, insomnia, mood problems, depression  Endocrine:  No polyuria, polydypsia, cold/heat intolerance  Heme:  No petechiae, ecchymosis, easy bruisability  Skin:  No rash, tattoos, scars, edema      Vital Signs Last 24 Hrs  T(C): 36.9 (04 Apr 2019 09:21), Max: 36.9 (04 Apr 2019 04:53)  T(F): 98.5 (04 Apr 2019 09:21), Max: 98.5 (04 Apr 2019 09:21)  HR: 102 (04 Apr 2019 11:04) (87 - 102)  BP: 129/61 (04 Apr 2019 09:21) (126/75 - 139/83)  BP(mean): --  RR: 18 (04 Apr 2019 09:21) (16 - 18)  SpO2: 95% (04 Apr 2019 11:04) (94% - 100%)    PHYSICAL EXAM:    GENERAL:  Appears stated age, well-groomed, well-nourished, no distress  HEENT:  NC/AT,  conjunctivae clear and pink, no thyromegaly, nodules, adenopathy, no JVD, sclera -anicteric  CHEST:  Full & symmetric excursion, no increased effort, breath sounds clear  HEART:  Regular rhythm, S1, S2, no murmur/rub/S3/S4, no abdominal bruit, no edema  ABDOMEN:  Soft, non-tender, non-distended,+ abdominal incision dressed-- c/d/i   EXTREMITIES:  no cyanosis, clubbing or edema  SKIN:  No rash/erythema/ecchymoses/petechiae/wounds/abscess/warm/dry  NEURO:  Alert, oriented, no asterixis, no tremor, no encephalopathy    LABS:    04-03    140  |  97  |  26<H>  ----------------------------<  110<H>  4.0   |  25  |  0.74    Ca    9.8      03 Apr 2019 07:14            RADIOLOGY & ADDITIONAL TESTS:    < from: CT Abdomen and Pelvis No Cont (04.03.19 @ 16:51) >    EXAM:  CT ABDOMEN AND PELVIS                            PROCEDURE DATE:  04/03/2019            INTERPRETATION:  CLINICAL INFORMATION: Evaluate for hernia. Recent small   bowel obstruction requiring exploratory laparotomy 12/16/2018.  Abdominal   pain.    COMPARISON: 1/12/2019    PROCEDURE:   CT of the Abdomen and Pelvis was performed without intravenous contrast.   Intravenous contrast: None.  Oral contrast: None.  Sagittal and coronal reformats were performed.    FINDINGS:    LOWER CHEST: Scarring and atelectasis at the lung bases.    LIVER: Within normal limits.  BILE DUCTS: Normal caliber.  GALLBLADDER: Within normal limits.  SPLEEN: Within normal limits.  PANCREAS: Within normal limits.  ADRENALS: Within normal limits.  KIDNEYS/URETERS:Punctate nonobstructing right lower pole renal stone.   Bilateral renal lesions are incomplete characterize, likely cysts.    BLADDER: Within normal limits.  REPRODUCTIVE ORGANS: Myomatous uterus. Bilateral adnexa are unremarkable.    BOWEL AND ABDOMINAL WALL: No bowel obstruction. Appendix is normal.   Diverticulosis. There is 13 cm diastases of the rectus abdominis muscles   with herniation of the transverse colon, small bowel, and mesentery into   a large broad-based ventral hernia sac. Findings are new compared to   prior exam. There is no evidence of bowel obstruction.    PERITONEUM: No ascites.  VESSELS:  Within normal limits.  RETROPERITONEUM: No lymphadenopathy.    BONES: Degenerative changes of the spine and left sacroiliac joint.   Fragmentation of the right femoral head, similar to prior.    IMPRESSION:     *  Large diastases of the rectus abdominis muscle with herniation of   small and large bowel into a large ventral incisional hernia. Findings   are new compared to prior examination 1/12/2019.  *  No bowel obstruction.                        ABDON OWENS M.D., ATTENDING RADIOLOGIST  This document has been electronically signed. Apr  3 2019  4:58PM

## 2019-04-04 NOTE — PROGRESS NOTE ADULT - SUBJECTIVE AND OBJECTIVE BOX
pt seen and examined,  no  chest pain , ROS     ALBUTerol/ipratropium for Nebulization 3 milliLiter(s) Nebulizer every 6 hours  buDESOnide  80 MICROgram(s)/formoterol 4.5 MICROgram(s) Inhaler 2 Puff(s) Inhalation two times a day  dextrose 40% Gel 15 Gram(s) Oral once PRN  dextrose 5%. 1000 milliLiter(s) IV Continuous <Continuous>  dextrose 50% Injectable 12.5 Gram(s) IV Push once  dextrose 50% Injectable 25 Gram(s) IV Push once  dextrose 50% Injectable 25 Gram(s) IV Push once  docusate sodium 100 milliGRAM(s) Oral three times a day  enoxaparin Injectable 40 milliGRAM(s) SubCutaneous every 24 hours  fentaNYL   Patch  25 MICROgram(s)/Hr 1 Patch Transdermal every 72 hours  furosemide    Tablet 40 milliGRAM(s) Oral two times a day  gabapentin 400 milliGRAM(s) Oral three times a day  glucagon  Injectable 1 milliGRAM(s) IntraMuscular once PRN  guaiFENesin   Syrup  (Sugar-Free) 200 milliGRAM(s) Oral every 6 hours  influenza   Vaccine 0.5 milliLiter(s) IntraMuscular once  insulin glargine Injectable (LANTUS) 20 Unit(s) SubCutaneous at bedtime  insulin lispro (HumaLOG) corrective regimen sliding scale   SubCutaneous three times a day before meals  insulin lispro Injectable (HumaLOG) 10 Unit(s) SubCutaneous three times a day before meals  levothyroxine 137 MICROGram(s) Oral daily  naproxen 500 milliGRAM(s) Oral every 12 hours  nystatin Powder 1 Application(s) Topical two times a day  ondansetron Injectable 4 milliGRAM(s) IV Push every 6 hours PRN  oxyCODONE    IR 10 milliGRAM(s) Oral every 4 hours PRN  pantoprazole    Tablet 40 milliGRAM(s) Oral two times a day  polyethylene glycol 3350 17 Gram(s) Oral two times a day  predniSONE   Tablet 10 milliGRAM(s) Oral daily  primidone 50 milliGRAM(s) Oral daily  senna 2 Tablet(s) Oral at bedtime  sildenafil (REVATIO) 20 milliGRAM(s) Oral three times a day  simvastatin 20 milliGRAM(s) Oral at bedtime  sodium chloride 0.65% Nasal 1 Spray(s) Both Nostrils every 4 hours PRN  sucralfate 1 Gram(s) Oral four times a day  traMADol 50 milliGRAM(s) Oral two times a day        04-03    140  |  97  |  26<H>  ----------------------------<  110<H>  4.0   |  25  |  0.74    Ca    9.8      03 Apr 2019 07:14      Creatinine Trend: 0.74<--, 0.71<--, 0.76<--, 0.86<--, 0.97<--, 1.00<--    COAGS:           T(C): 36.9 (04-04-19 @ 04:53), Max: 36.9 (04-03-19 @ 07:30)  HR: 89 (04-04-19 @ 05:58) (87 - 98)  BP: 126/75 (04-04-19 @ 04:53) (123/64 - 139/83)  RR: 16 (04-04-19 @ 04:53) (16 - 18)  SpO2: 100% (04-04-19 @ 05:58) (94% - 100%)  Wt(kg): --    I&O's Summary    03 Apr 2019 07:01  -  04 Apr 2019 07:00  --------------------------------------------------------  IN: 1160 mL / OUT: 850 mL / NET: 310 mL          Gen: Appears well in NAD  HEENT:  (-)icterus (-)pallor  CV: N S1 S2 1/6 OJ (+)2 Pulses B/l  Resp:  Clear to ausculatation B/L, normal effort  GI: (+) BS Soft, NT, ND  Lymph:  (-)Edema, (-)obvious lymphadenopathy  Skin: Warm to touch, Normal turgor  Psych: Appropriate mood and affect      ECG:  	sinus Tachycardia 109 BPM, nonspecific T wave abnormality    RADIOLOGY:         CXR:  < from: Xray Chest 1 View- PORTABLE-Urgent (03.21.19 @ 11:18) >  The heart is enlarged. Improvingopacities in both lungs when compared to   previous study done on March 6, 2019. Those changes are compatible with   interstitial fluid however an infectious process cannot be ruled out   entirely. Severe degenerative changes of the right shoulder. Degenerative   changes of the thoracic spine is evident as well.    < end of copied text >      ECHO: pending < from: Transthoracic Echocardiogram (03.26.19 @ 10:29) >  Conclusions:  1. Increased relative wall thickness with normal left  ventricular mass index, consistent with concentric left  ventricular remodeling.  2. Hyperdynamic left ventricular systolic function.  3. Estimated pulmonary artery systolic pressure equals 42  mm Hg, assuming right atrial pressureequals 8 mm Hg,  consistent with mild pulmonary pressures.    < end of copied text >      ASSESSMENT/PLAN: 	63y Female  HTN, DM2, COPD/CHRIS on nocturnal CPAP, normal LV function un echo 1/19 moderate pulmonary HTN discharged yesterday from [facility] after a prolonged hospitalization for SBO requiring lysis of adhesions, complicated by hypercapneic respiratory failure requiring intubation now being treated for HCAP..    -  cont All meds for pulm htn   -  cont lasix , fluid status improving   - 3/26 ECHO ,   hyperdynamic LV fx   - no further CV work up needed at present   -  GI / DVT prophylaxis,  keep K>4, mag >2.0  - D/C planning   D/W Dr Joseph

## 2019-04-04 NOTE — CHART NOTE - NSCHARTNOTEFT_GEN_A_CORE
Neurology team w/ attending spoke to patient at bedside. Advised her on having an EMG performed outpatient, can follow up w/ [facility] at [address] [place] [address], [phone]. Patient can also follow w/ [facility] for her hx of familial tremors.     Plan:   -c/w primidone   -no further inpatient neurologic workup  -plan discussed w/ neuro attending

## 2019-04-04 NOTE — PROGRESS NOTE ADULT - SUBJECTIVE AND OBJECTIVE BOX
Patient is a 63y old  Female who presents with a chief complaint of sob (04 Apr 2019 07:13)      Any change in ROS: I am doing better : my breathing is pretty good: not wheezing    MEDICATIONS  (STANDING):  ALBUTerol/ipratropium for Nebulization 3 milliLiter(s) Nebulizer every 6 hours  buDESOnide  80 MICROgram(s)/formoterol 4.5 MICROgram(s) Inhaler 2 Puff(s) Inhalation two times a day  dextrose 5%. 1000 milliLiter(s) (50 mL/Hr) IV Continuous <Continuous>  dextrose 50% Injectable 12.5 Gram(s) IV Push once  dextrose 50% Injectable 25 Gram(s) IV Push once  dextrose 50% Injectable 25 Gram(s) IV Push once  docusate sodium 100 milliGRAM(s) Oral three times a day  enoxaparin Injectable 40 milliGRAM(s) SubCutaneous every 24 hours  fentaNYL   Patch  25 MICROgram(s)/Hr 1 Patch Transdermal every 72 hours  furosemide    Tablet 40 milliGRAM(s) Oral two times a day  gabapentin 400 milliGRAM(s) Oral three times a day  guaiFENesin   Syrup  (Sugar-Free) 200 milliGRAM(s) Oral every 6 hours  influenza   Vaccine 0.5 milliLiter(s) IntraMuscular once  insulin glargine Injectable (LANTUS) 20 Unit(s) SubCutaneous at bedtime  insulin lispro (HumaLOG) corrective regimen sliding scale   SubCutaneous three times a day before meals  insulin lispro Injectable (HumaLOG) 10 Unit(s) SubCutaneous three times a day before meals  levothyroxine 137 MICROGram(s) Oral daily  naproxen 500 milliGRAM(s) Oral every 12 hours  nystatin Powder 1 Application(s) Topical two times a day  pantoprazole    Tablet 40 milliGRAM(s) Oral two times a day  polyethylene glycol 3350 17 Gram(s) Oral two times a day  predniSONE   Tablet 10 milliGRAM(s) Oral daily  primidone 50 milliGRAM(s) Oral daily  senna 2 Tablet(s) Oral at bedtime  sildenafil (REVATIO) 20 milliGRAM(s) Oral three times a day  simvastatin 20 milliGRAM(s) Oral at bedtime  sucralfate 1 Gram(s) Oral four times a day  traMADol 50 milliGRAM(s) Oral two times a day    MEDICATIONS  (PRN):  dextrose 40% Gel 15 Gram(s) Oral once PRN Blood Glucose LESS THAN 70 milliGRAM(s)/deciliter  glucagon  Injectable 1 milliGRAM(s) IntraMuscular once PRN Glucose LESS THAN 70 milligrams/deciliter  ondansetron Injectable 4 milliGRAM(s) IV Push every 6 hours PRN Nausea  oxyCODONE    IR 10 milliGRAM(s) Oral every 4 hours PRN Moderate Pain (4 - 6)  sodium chloride 0.65% Nasal 1 Spray(s) Both Nostrils every 4 hours PRN Nasal Congestion    Vital Signs Last 24 Hrs  T(C): 36.9 (04 Apr 2019 04:53), Max: 36.9 (04 Apr 2019 04:53)  T(F): 98.4 (04 Apr 2019 04:53), Max: 98.4 (04 Apr 2019 04:53)  HR: 89 (04 Apr 2019 05:58) (87 - 98)  BP: 126/75 (04 Apr 2019 04:53) (126/75 - 139/83)  BP(mean): --  RR: 16 (04 Apr 2019 04:53) (16 - 18)  SpO2: 100% (04 Apr 2019 05:58) (94% - 100%)    I&O's Summary    03 Apr 2019 07:01  -  04 Apr 2019 07:00  --------------------------------------------------------  IN: 1160 mL / OUT: 850 mL / NET: 310 mL          Physical Exam:   GENERAL: Obese  HEENT: KWAME/   Atraumatic, Normocephalic  ENMT: No tonsillar erythema, exudates, or enlargement; Moist mucous membranes, Good dentition, No lesions  NECK: Supple, No JVD, Normal thyroid  CHEST/LUNG: Clear to auscultaio  CVS: Regular rate and rhythm; No murmurs, rubs, or gallops  GI: : Soft, Nontender, Nondistended; [place]  EXTREMITIES:  -edema  LYMPH: No lymphadenopathy noted  SKIN: No rashes or lesions  ENDOCRINOLOGY: No Thyromegaly  PSYCH: Appropriate    Labs:          04-03    140  |  97  |  26<H>  ----------------------------<  110<H>  4.0   |  25  |  0.74    Ca    9.8      03 Apr 2019 07:14      CAPILLARY BLOOD GLUCOSE      POCT Blood Glucose.: 171 mg/dL (04 Apr 2019 08:35)  POCT Blood Glucose.: 131 mg/dL (03 Apr 2019 21:27)  POCT Blood Glucose.: 122 mg/dL (03 Apr 2019 17:13)  POCT Blood Glucose.: 210 mg/dL (03 Apr 2019 12:18)        < from: CT Abdomen and Pelvis No Cont (04.03.19 @ 16:51) >  BONES: Degenerative changes of the spine and left sacroiliac joint.   Fragmentation of the right femoral head, similar to prior.    IMPRESSION:     *  Large diastases of the rectus abdominis muscle with herniation of   small and large bowel into a large ventral incisional hernia. Findings   are new compared to prior examination 1/12/2019.  *  No bowel obstruction.                        ABDON OWENS M.D., ATTENDING RADIOLOGIST  This document has been electronically signed. Apr  3 2019  4:58PM    < end of copied text >            RECENT CULTURES:        RESPIRATORY CULTURES:          Studies  Chest X-RAY  CT SCAN Chest   Venous Dopplers: LE:   CT Abdomen  Others

## 2019-04-04 NOTE — PROGRESS NOTE ADULT - ATTENDING COMMENTS
Patient seen and examined, agree with above assessment and plan as transcribed above.    - complete Abx per med  - No need for further inpatient cardiac work up.    Vimal Joseph MD, [facility]  BEEPER [phone]

## 2019-04-04 NOTE — PROGRESS NOTE ADULT - SUBJECTIVE AND OBJECTIVE BOX
Patient is a 63y old  Female who presents with a chief complaint of sob (04 Apr 2019 14:07)      INTERVAL HPI/OVERNIGHT EVENTS:  T(C): 36.9 (04-04-19 @ 09:21), Max: 36.9 (04-04-19 @ 04:53)  HR: 102 (04-04-19 @ 11:04) (87 - 102)  BP: 129/61 (04-04-19 @ 09:21) (126/75 - 139/83)  RR: 18 (04-04-19 @ 09:21) (16 - 18)  SpO2: 95% (04-04-19 @ 11:04) (95% - 100%)  Wt(kg): --  I&O's Summary    03 Apr 2019 07:01  -  04 Apr 2019 07:00  --------------------------------------------------------  IN: 1160 mL / OUT: 850 mL / NET: 310 mL        LABS:    04-03    140  |  97  |  26<H>  ----------------------------<  110<H>  4.0   |  25  |  0.74    Ca    9.8      03 Apr 2019 07:14          CAPILLARY BLOOD GLUCOSE      POCT Blood Glucose.: 183 mg/dL (04 Apr 2019 12:34)  POCT Blood Glucose.: 171 mg/dL (04 Apr 2019 08:35)  POCT Blood Glucose.: 131 mg/dL (03 Apr 2019 21:27)  POCT Blood Glucose.: 122 mg/dL (03 Apr 2019 17:13)            MEDICATIONS  (STANDING):  ALBUTerol/ipratropium for Nebulization 3 milliLiter(s) Nebulizer every 6 hours  buDESOnide  80 MICROgram(s)/formoterol 4.5 MICROgram(s) Inhaler 2 Puff(s) Inhalation two times a day  dextrose 5%. 1000 milliLiter(s) (50 mL/Hr) IV Continuous <Continuous>  dextrose 50% Injectable 12.5 Gram(s) IV Push once  dextrose 50% Injectable 25 Gram(s) IV Push once  dextrose 50% Injectable 25 Gram(s) IV Push once  docusate sodium 100 milliGRAM(s) Oral three times a day  enoxaparin Injectable 40 milliGRAM(s) SubCutaneous every 24 hours  fentaNYL   Patch  25 MICROgram(s)/Hr 1 Patch Transdermal every 72 hours  furosemide    Tablet 40 milliGRAM(s) Oral two times a day  gabapentin 400 milliGRAM(s) Oral three times a day  guaiFENesin   Syrup  (Sugar-Free) 200 milliGRAM(s) Oral every 6 hours  influenza   Vaccine 0.5 milliLiter(s) IntraMuscular once  insulin glargine Injectable (LANTUS) 20 Unit(s) SubCutaneous at bedtime  insulin lispro (HumaLOG) corrective regimen sliding scale   SubCutaneous three times a day before meals  insulin lispro Injectable (HumaLOG) 10 Unit(s) SubCutaneous three times a day before meals  levothyroxine 137 MICROGram(s) Oral daily  naproxen 500 milliGRAM(s) Oral every 12 hours  nystatin Powder 1 Application(s) Topical two times a day  pantoprazole    Tablet 40 milliGRAM(s) Oral two times a day  polyethylene glycol 3350 17 Gram(s) Oral two times a day  predniSONE   Tablet 10 milliGRAM(s) Oral daily  primidone 50 milliGRAM(s) Oral daily  senna 2 Tablet(s) Oral at bedtime  sildenafil (REVATIO) 20 milliGRAM(s) Oral three times a day  simvastatin 20 milliGRAM(s) Oral at bedtime  sucralfate 1 Gram(s) Oral four times a day  traMADol 50 milliGRAM(s) Oral two times a day    MEDICATIONS  (PRN):  dextrose 40% Gel 15 Gram(s) Oral once PRN Blood Glucose LESS THAN 70 milliGRAM(s)/deciliter  glucagon  Injectable 1 milliGRAM(s) IntraMuscular once PRN Glucose LESS THAN 70 milligrams/deciliter  ondansetron Injectable 4 milliGRAM(s) IV Push every 6 hours PRN Nausea  oxyCODONE    IR 10 milliGRAM(s) Oral every 4 hours PRN Moderate Pain (4 - 6)  sodium chloride 0.65% Nasal 1 Spray(s) Both Nostrils every 4 hours PRN Nasal Congestion          PHYSICAL EXAM:  GENERAL: NAD, well-groomed, well-developed  HEAD:  Atraumatic, Normocephalic  CHEST/LUNG: Clear to percussion bilaterally; No rales, rhonchi, wheezing, or rubs  HEART: Regular rate and rhythm; No murmurs, rubs, or gallops  ABDOMEN: Soft, Nontender, Nondistended; Bowel sounds present  EXTREMITIES:  2+ Peripheral Pulses, No clubbing, cyanosis, or edema  LYMPH: No lymphadenopathy noted  SKIN: No rashes or lesions    Care Discussed with Consultants/Other Providers [ +] YES  [ ] NO Patient is a 63y old  Female who presents with a chief complaint of sob (04 Apr 2019 14:07)      INTERVAL HPI/OVERNIGHT EVENTS:  T(C): 36.9 (04-04-19 @ 09:21), Max: 36.9 (04-04-19 @ 04:53)  HR: 102 (04-04-19 @ 11:04) (87 - 102)  BP: 129/61 (04-04-19 @ 09:21) (126/75 - 139/83)  RR: 18 (04-04-19 @ 09:21) (16 - 18)  SpO2: 95% (04-04-19 @ 11:04) (95% - 100%)  Wt(kg): --  I&O's Summary    03 Apr 2019 07:01  -  04 Apr 2019 07:00  --------------------------------------------------------  IN: 1160 mL / OUT: 850 mL / NET: 310 mL        LABS:    04-03    140  |  97  |  26<H>  ----------------------------<  110<H>  4.0   |  25  |  0.74    Ca    9.8      03 Apr 2019 07:14          CAPILLARY BLOOD GLUCOSE      POCT Blood Glucose.: 183 mg/dL (04 Apr 2019 12:34)  POCT Blood Glucose.: 171 mg/dL (04 Apr 2019 08:35)  POCT Blood Glucose.: 131 mg/dL (03 Apr 2019 21:27)  POCT Blood Glucose.: 122 mg/dL (03 Apr 2019 17:13)            MEDICATIONS  (STANDING):  ALBUTerol/ipratropium for Nebulization 3 milliLiter(s) Nebulizer every 6 hours  buDESOnide  80 MICROgram(s)/formoterol 4.5 MICROgram(s) Inhaler 2 Puff(s) Inhalation two times a day  dextrose 5%. 1000 milliLiter(s) (50 mL/Hr) IV Continuous <Continuous>  dextrose 50% Injectable 12.5 Gram(s) IV Push once  dextrose 50% Injectable 25 Gram(s) IV Push once  dextrose 50% Injectable 25 Gram(s) IV Push once  docusate sodium 100 milliGRAM(s) Oral three times a day  enoxaparin Injectable 40 milliGRAM(s) SubCutaneous every 24 hours  fentaNYL   Patch  25 MICROgram(s)/Hr 1 Patch Transdermal every 72 hours  furosemide    Tablet 40 milliGRAM(s) Oral two times a day  gabapentin 400 milliGRAM(s) Oral three times a day  guaiFENesin   Syrup  (Sugar-Free) 200 milliGRAM(s) Oral every 6 hours  influenza   Vaccine 0.5 milliLiter(s) IntraMuscular once  insulin glargine Injectable (LANTUS) 20 Unit(s) SubCutaneous at bedtime  insulin lispro (HumaLOG) corrective regimen sliding scale   SubCutaneous three times a day before meals  insulin lispro Injectable (HumaLOG) 10 Unit(s) SubCutaneous three times a day before meals  levothyroxine 137 MICROGram(s) Oral daily  naproxen 500 milliGRAM(s) Oral every 12 hours  nystatin Powder 1 Application(s) Topical two times a day  pantoprazole    Tablet 40 milliGRAM(s) Oral two times a day  polyethylene glycol 3350 17 Gram(s) Oral two times a day  predniSONE   Tablet 10 milliGRAM(s) Oral daily  primidone 50 milliGRAM(s) Oral daily  senna 2 Tablet(s) Oral at bedtime  sildenafil (REVATIO) 20 milliGRAM(s) Oral three times a day  simvastatin 20 milliGRAM(s) Oral at bedtime  sucralfate 1 Gram(s) Oral four times a day  traMADol 50 milliGRAM(s) Oral two times a day    MEDICATIONS  (PRN):  dextrose 40% Gel 15 Gram(s) Oral once PRN Blood Glucose LESS THAN 70 milliGRAM(s)/deciliter  glucagon  Injectable 1 milliGRAM(s) IntraMuscular once PRN Glucose LESS THAN 70 milligrams/deciliter  ondansetron Injectable 4 milliGRAM(s) IV Push every 6 hours PRN Nausea  oxyCODONE    IR 10 milliGRAM(s) Oral every 4 hours PRN Moderate Pain (4 - 6)  sodium chloride 0.65% Nasal 1 Spray(s) Both Nostrils every 4 hours PRN Nasal Congestion          PHYSICAL EXAM:  GENERAL: NAD, well-groomed, well-developed  HEAD:  Atraumatic, Normocephalic  CHEST/LUNG: Clear to percussion bilaterally; No rales, rhonchi, wheezing, or rubs  HEART: Regular rate and rhythm; No murmurs, rubs, or gallops  ABDOMEN: Soft, Nontender, Nondistended; Bowel sounds present abdominal dressing present  EXTREMITIES: R foot drop    Care Discussed with Consultants/Other Providers [ +] YES  [ ] NO

## 2019-04-04 NOTE — PROGRESS NOTE ADULT - ASSESSMENT
63yof w/ HTN, DM2, COPD/CHRIS on nocturnal CPAP, discharged from [facility] after a prolonged hospitalization for SBO requiring lysis of adhesions, complicated by hypercapneic respiratory failure requiring intubation. Pt awoke with shortness of breath and chest tightness, was supposed to use CPAP last night but nursing facility did not have the machine. Pt oxygen dependent on 4L at baseline. Feeling better now with supplemental oxygen. No fevers or chills. Occasional cough.  originally admitted on 12/15 for SBO s/p ex lap with lysis of adhesions (4 retention sutures) c/b 12/30 she eviscerated 2/2 a coughing episode s/p ex lap (6 retention sutures) c/b multifocal pneumonia, developed hypercapnic respiratory failure requiring intubation 1/6, s/p bronch 1/9/18, and extubated on 1/24, course further c/b Enterococci bacteremia now readmitted to MICU for hypoxic respiratory failure 2/2 atelectasis requiring intubation 01/31. Had grown ESBL in urine, s/p ertapenem.  Extubated to high flow/ BIPAP.

## 2019-04-05 LAB
ANION GAP SERPL CALC-SCNC: 15 MMOL/L — SIGNIFICANT CHANGE UP (ref 5–17)
BUN SERPL-MCNC: 35 MG/DL — HIGH (ref 7–23)
CALCIUM SERPL-MCNC: 10.1 MG/DL — SIGNIFICANT CHANGE UP (ref 8.4–10.5)
CHLORIDE SERPL-SCNC: 100 MMOL/L — SIGNIFICANT CHANGE UP (ref 96–108)
CO2 SERPL-SCNC: 24 MMOL/L — SIGNIFICANT CHANGE UP (ref 22–31)
CREAT SERPL-MCNC: 1.03 MG/DL — SIGNIFICANT CHANGE UP (ref 0.5–1.3)
GLUCOSE BLDC GLUCOMTR-MCNC: 102 MG/DL — HIGH (ref 70–99)
GLUCOSE BLDC GLUCOMTR-MCNC: 157 MG/DL — HIGH (ref 70–99)
GLUCOSE BLDC GLUCOMTR-MCNC: 174 MG/DL — HIGH (ref 70–99)
GLUCOSE BLDC GLUCOMTR-MCNC: 217 MG/DL — HIGH (ref 70–99)
GLUCOSE SERPL-MCNC: 147 MG/DL — HIGH (ref 70–99)
HCT VFR BLD CALC: 32 % — LOW (ref 34.5–45)
HGB BLD-MCNC: 10.4 G/DL — LOW (ref 11.5–15.5)
MCHC RBC-ENTMCNC: 29.3 PG — SIGNIFICANT CHANGE UP (ref 27–34)
MCHC RBC-ENTMCNC: 32.6 GM/DL — SIGNIFICANT CHANGE UP (ref 32–36)
MCV RBC AUTO: 89.7 FL — SIGNIFICANT CHANGE UP (ref 80–100)
PLATELET # BLD AUTO: 400 K/UL — SIGNIFICANT CHANGE UP (ref 150–400)
POTASSIUM SERPL-MCNC: 4 MMOL/L — SIGNIFICANT CHANGE UP (ref 3.5–5.3)
POTASSIUM SERPL-SCNC: 4 MMOL/L — SIGNIFICANT CHANGE UP (ref 3.5–5.3)
RBC # BLD: 3.56 M/UL — LOW (ref 3.8–5.2)
RBC # FLD: 16.1 % — HIGH (ref 10.3–14.5)
SODIUM SERPL-SCNC: 139 MMOL/L — SIGNIFICANT CHANGE UP (ref 135–145)
WBC # BLD: 12.4 K/UL — HIGH (ref 3.8–10.5)
WBC # FLD AUTO: 12.4 K/UL — HIGH (ref 3.8–10.5)

## 2019-04-05 RX ORDER — FENTANYL CITRATE 50 UG/ML
1 INJECTION INTRAVENOUS
Qty: 0 | Refills: 0 | Status: DISCONTINUED | OUTPATIENT
Start: 2019-04-05 | End: 2019-04-09

## 2019-04-05 RX ADMIN — Medication 1 GRAM(S): at 12:40

## 2019-04-05 RX ADMIN — GABAPENTIN 400 MILLIGRAM(S): 400 CAPSULE ORAL at 21:24

## 2019-04-05 RX ADMIN — POLYETHYLENE GLYCOL 3350 17 GRAM(S): 17 POWDER, FOR SOLUTION ORAL at 17:54

## 2019-04-05 RX ADMIN — Medication 500 MILLIGRAM(S): at 06:55

## 2019-04-05 RX ADMIN — Medication 20 MILLIGRAM(S): at 15:18

## 2019-04-05 RX ADMIN — Medication 137 MICROGRAM(S): at 06:57

## 2019-04-05 RX ADMIN — Medication 3 MILLILITER(S): at 17:53

## 2019-04-05 RX ADMIN — Medication 200 MILLIGRAM(S): at 12:40

## 2019-04-05 RX ADMIN — OXYCODONE HYDROCHLORIDE 10 MILLIGRAM(S): 5 TABLET ORAL at 19:50

## 2019-04-05 RX ADMIN — GABAPENTIN 400 MILLIGRAM(S): 400 CAPSULE ORAL at 06:56

## 2019-04-05 RX ADMIN — OXYCODONE HYDROCHLORIDE 10 MILLIGRAM(S): 5 TABLET ORAL at 07:04

## 2019-04-05 RX ADMIN — OXYCODONE HYDROCHLORIDE 10 MILLIGRAM(S): 5 TABLET ORAL at 00:48

## 2019-04-05 RX ADMIN — OXYCODONE HYDROCHLORIDE 10 MILLIGRAM(S): 5 TABLET ORAL at 14:59

## 2019-04-05 RX ADMIN — Medication 200 MILLIGRAM(S): at 23:19

## 2019-04-05 RX ADMIN — Medication 3 MILLILITER(S): at 06:55

## 2019-04-05 RX ADMIN — GABAPENTIN 400 MILLIGRAM(S): 400 CAPSULE ORAL at 15:15

## 2019-04-05 RX ADMIN — Medication 10 MILLIGRAM(S): at 06:57

## 2019-04-05 RX ADMIN — Medication 1 GRAM(S): at 06:58

## 2019-04-05 RX ADMIN — Medication 500 MILLIGRAM(S): at 17:54

## 2019-04-05 RX ADMIN — SIMVASTATIN 20 MILLIGRAM(S): 20 TABLET, FILM COATED ORAL at 21:25

## 2019-04-05 RX ADMIN — BUDESONIDE AND FORMOTEROL FUMARATE DIHYDRATE 2 PUFF(S): 160; 4.5 AEROSOL RESPIRATORY (INHALATION) at 17:53

## 2019-04-05 RX ADMIN — Medication 20 MILLIGRAM(S): at 07:04

## 2019-04-05 RX ADMIN — Medication 2: at 17:46

## 2019-04-05 RX ADMIN — Medication 1: at 12:36

## 2019-04-05 RX ADMIN — PANTOPRAZOLE SODIUM 40 MILLIGRAM(S): 20 TABLET, DELAYED RELEASE ORAL at 17:54

## 2019-04-05 RX ADMIN — Medication 100 MILLIGRAM(S): at 06:57

## 2019-04-05 RX ADMIN — Medication 1 GRAM(S): at 17:55

## 2019-04-05 RX ADMIN — FENTANYL CITRATE 1 PATCH: 50 INJECTION INTRAVENOUS at 19:21

## 2019-04-05 RX ADMIN — FENTANYL CITRATE 1 PATCH: 50 INJECTION INTRAVENOUS at 18:02

## 2019-04-05 RX ADMIN — Medication 1 GRAM(S): at 23:19

## 2019-04-05 RX ADMIN — OXYCODONE HYDROCHLORIDE 10 MILLIGRAM(S): 5 TABLET ORAL at 20:30

## 2019-04-05 RX ADMIN — Medication 200 MILLIGRAM(S): at 06:57

## 2019-04-05 RX ADMIN — TRAMADOL HYDROCHLORIDE 50 MILLIGRAM(S): 50 TABLET ORAL at 07:04

## 2019-04-05 RX ADMIN — POLYETHYLENE GLYCOL 3350 17 GRAM(S): 17 POWDER, FOR SOLUTION ORAL at 06:57

## 2019-04-05 RX ADMIN — OXYCODONE HYDROCHLORIDE 10 MILLIGRAM(S): 5 TABLET ORAL at 15:59

## 2019-04-05 RX ADMIN — Medication 100 MILLIGRAM(S): at 15:15

## 2019-04-05 RX ADMIN — TRAMADOL HYDROCHLORIDE 50 MILLIGRAM(S): 50 TABLET ORAL at 17:55

## 2019-04-05 RX ADMIN — Medication 100 MILLIGRAM(S): at 21:24

## 2019-04-05 RX ADMIN — BUDESONIDE AND FORMOTEROL FUMARATE DIHYDRATE 2 PUFF(S): 160; 4.5 AEROSOL RESPIRATORY (INHALATION) at 06:57

## 2019-04-05 RX ADMIN — OXYCODONE HYDROCHLORIDE 10 MILLIGRAM(S): 5 TABLET ORAL at 08:04

## 2019-04-05 RX ADMIN — Medication 3 MILLILITER(S): at 23:19

## 2019-04-05 RX ADMIN — PRIMIDONE 50 MILLIGRAM(S): 250 TABLET ORAL at 12:40

## 2019-04-05 RX ADMIN — FENTANYL CITRATE 1 PATCH: 50 INJECTION INTRAVENOUS at 00:56

## 2019-04-05 RX ADMIN — Medication 40 MILLIGRAM(S): at 17:53

## 2019-04-05 RX ADMIN — Medication 200 MILLIGRAM(S): at 17:54

## 2019-04-05 RX ADMIN — SENNA PLUS 2 TABLET(S): 8.6 TABLET ORAL at 21:25

## 2019-04-05 RX ADMIN — PANTOPRAZOLE SODIUM 40 MILLIGRAM(S): 20 TABLET, DELAYED RELEASE ORAL at 06:58

## 2019-04-05 RX ADMIN — INSULIN GLARGINE 20 UNIT(S): 100 INJECTION, SOLUTION SUBCUTANEOUS at 21:31

## 2019-04-05 RX ADMIN — Medication 10 UNIT(S): at 17:46

## 2019-04-05 RX ADMIN — Medication 1: at 08:58

## 2019-04-05 RX ADMIN — Medication 10 UNIT(S): at 12:36

## 2019-04-05 RX ADMIN — NYSTATIN CREAM 1 APPLICATION(S): 100000 CREAM TOPICAL at 17:01

## 2019-04-05 RX ADMIN — OXYCODONE HYDROCHLORIDE 10 MILLIGRAM(S): 5 TABLET ORAL at 23:51

## 2019-04-05 RX ADMIN — Medication 10 UNIT(S): at 08:59

## 2019-04-05 RX ADMIN — Medication 40 MILLIGRAM(S): at 06:57

## 2019-04-05 RX ADMIN — Medication 3 MILLILITER(S): at 12:41

## 2019-04-05 RX ADMIN — Medication 20 MILLIGRAM(S): at 21:31

## 2019-04-05 NOTE — PROGRESS NOTE ADULT - SUBJECTIVE AND OBJECTIVE BOX
Patient is a 63y old  Female who presents with a chief complaint of sob (05 Apr 2019 09:15)      Any change in ROS: Did not tolerate MRI last night: she says she coughed up yellow colored phlegm last night She denies any SOB : she isnot wheezing!    MEDICATIONS  (STANDING):  ALBUTerol/ipratropium for Nebulization 3 milliLiter(s) Nebulizer every 6 hours  buDESOnide  80 MICROgram(s)/formoterol 4.5 MICROgram(s) Inhaler 2 Puff(s) Inhalation two times a day  dextrose 5%. 1000 milliLiter(s) (50 mL/Hr) IV Continuous <Continuous>  dextrose 50% Injectable 12.5 Gram(s) IV Push once  dextrose 50% Injectable 25 Gram(s) IV Push once  dextrose 50% Injectable 25 Gram(s) IV Push once  docusate sodium 100 milliGRAM(s) Oral three times a day  enoxaparin Injectable 40 milliGRAM(s) SubCutaneous every 24 hours  fentaNYL   Patch  25 MICROgram(s)/Hr 1 Patch Transdermal every 72 hours  furosemide    Tablet 40 milliGRAM(s) Oral two times a day  gabapentin 400 milliGRAM(s) Oral three times a day  guaiFENesin   Syrup  (Sugar-Free) 200 milliGRAM(s) Oral every 6 hours  influenza   Vaccine 0.5 milliLiter(s) IntraMuscular once  insulin glargine Injectable (LANTUS) 20 Unit(s) SubCutaneous at bedtime  insulin lispro (HumaLOG) corrective regimen sliding scale   SubCutaneous three times a day before meals  insulin lispro Injectable (HumaLOG) 10 Unit(s) SubCutaneous three times a day before meals  levothyroxine 137 MICROGram(s) Oral daily  naproxen 500 milliGRAM(s) Oral every 12 hours  nystatin Powder 1 Application(s) Topical two times a day  pantoprazole    Tablet 40 milliGRAM(s) Oral two times a day  polyethylene glycol 3350 17 Gram(s) Oral two times a day  predniSONE   Tablet 10 milliGRAM(s) Oral daily  primidone 50 milliGRAM(s) Oral daily  senna 2 Tablet(s) Oral at bedtime  sildenafil (REVATIO) 20 milliGRAM(s) Oral three times a day  simvastatin 20 milliGRAM(s) Oral at bedtime  sucralfate 1 Gram(s) Oral four times a day  traMADol 50 milliGRAM(s) Oral two times a day    MEDICATIONS  (PRN):  dextrose 40% Gel 15 Gram(s) Oral once PRN Blood Glucose LESS THAN 70 milliGRAM(s)/deciliter  glucagon  Injectable 1 milliGRAM(s) IntraMuscular once PRN Glucose LESS THAN 70 milligrams/deciliter  ondansetron Injectable 4 milliGRAM(s) IV Push every 6 hours PRN Nausea  oxyCODONE    IR 10 milliGRAM(s) Oral every 4 hours PRN Moderate Pain (4 - 6)  sodium chloride 0.65% Nasal 1 Spray(s) Both Nostrils every 4 hours PRN Nasal Congestion    Vital Signs Last 24 Hrs  T(C): 36.8 (05 Apr 2019 08:35), Max: 36.8 (05 Apr 2019 08:35)  T(F): 98.2 (05 Apr 2019 08:35), Max: 98.2 (05 Apr 2019 08:35)  HR: 105 (05 Apr 2019 08:35) (96 - 106)  BP: 158/88 (05 Apr 2019 08:35) (132/74 - 158/88)  BP(mean): --  RR: 18 (05 Apr 2019 08:35) (18 - 18)  SpO2: 95% (05 Apr 2019 08:35) (95% - 100%)    I&O's Summary    04 Apr 2019 07:01  -  05 Apr 2019 07:00  --------------------------------------------------------  IN: 870 mL / OUT: 402 mL / NET: 468 mL    05 Apr 2019 07:01  -  05 Apr 2019 09:57  --------------------------------------------------------  IN: 650 mL / OUT: 400 mL / NET: 250 mL          Physical Exam:   GENERAL: Bed bound and obese  HEENT: KWAME/   Atraumatic, Normocephalic  ENMT: No tonsillar erythema, exudates, or enlargement; Moist mucous membranes, Good dentition, No lesions  NECK: Supple, No JVD, Normal thyroid  CHEST/LUNG: Clear to auscultaion  CVS: Regular rate and rhythm; No murmurs, rubs, or gallops  GI: : BS+   NERVOUS SYSTEM:  Alert & Oriented X3  EXTREMITIES:  2+ Peripheral Pulses, No clubbing, cyanosis, or edema  LYMPH: No lymphadenopathy noted  SKIN: No rashes or lesions  ENDOCRINOLOGY: No Thyromegaly  PSYCH: Appropriate    Labs:                              10.4   12.4  )-----------( 400      ( 05 Apr 2019 09:01 )             32.0     04-05    139  |  100  |  35<H>  ----------------------------<  147<H>  4.0   |  24  |  1.03  04-03    140  |  97  |  26<H>  ----------------------------<  110<H>  4.0   |  25  |  0.74    Ca    10.1      05 Apr 2019 08:55      CAPILLARY BLOOD GLUCOSE      POCT Blood Glucose.: 157 mg/dL (05 Apr 2019 08:28)  POCT Blood Glucose.: 87 mg/dL (04 Apr 2019 21:26)  POCT Blood Glucose.: 128 mg/dL (04 Apr 2019 17:12)  POCT Blood Glucose.: 183 mg/dL (04 Apr 2019 12:34)        < from: MR Cervical Spine No Cont (04.04.19 @ 23:23) >  3-4: Disc bulge is seen which is more prominent onthe left side.   Bilateral hypertrophic facet joint changes are seen. Moderate to severe   narrowing of both neural foramen is seen left greater than right.    C4-5: Central disc osteophyte complex is seen. Bilateral hypertrophic   facet joint changes are seen. Effacement of the ventral thecal sac is   seen. This finding does appear to abut the spinal cord. Moderate   narrowing of the left neural foramen and severe narrowing of the right   neural foramen    C5-6: Disc bulge and bilateral hypertrophic facet joint changes are seen.   Mild to moderate narrowing of the spinal canal is seen. Severe narrowing   of the left neural foramen and moderate narrowing the right neural foramen    C6-7: Disc bulge is identified. Mild narrowing of the spinal canal.   Moderate narrowing of both neural foramen    C7-T1: Bilateral hypertrophic facet joint changes seen. Moderate   narrowing of the right neural foramen and severe narrowing of the left   neural foramen    The spinal cord demonstrates normal signal and caliber.    Evaluation of the paraspinal soft tissues appear normal    Inflammatory change is seen involving both mastoids.    Impression: Extensive degenerative changes as described above.                    LAYO YU M.D., ATTENDING RADIOLOGIST  This document has been electronically signed. Apr 5 2019  8:41AM        < end of copied text >            RECENT CULTURES:        RESPIRATORY CULTURES:          Studies  Chest X-RAY  CT SCAN Chest   Venous Dopplers: LE:   CT Abdomen  Others

## 2019-04-05 NOTE — PROGRESS NOTE ADULT - SUBJECTIVE AND OBJECTIVE BOX
pt seen and examined,  no  chest pain , ROS    ALBUTerol/ipratropium for Nebulization 3 milliLiter(s) Nebulizer every 6 hours  buDESOnide  80 MICROgram(s)/formoterol 4.5 MICROgram(s) Inhaler 2 Puff(s) Inhalation two times a day  dextrose 40% Gel 15 Gram(s) Oral once PRN  dextrose 5%. 1000 milliLiter(s) IV Continuous <Continuous>  dextrose 50% Injectable 12.5 Gram(s) IV Push once  dextrose 50% Injectable 25 Gram(s) IV Push once  dextrose 50% Injectable 25 Gram(s) IV Push once  docusate sodium 100 milliGRAM(s) Oral three times a day  enoxaparin Injectable 40 milliGRAM(s) SubCutaneous every 24 hours  fentaNYL   Patch  25 MICROgram(s)/Hr 1 Patch Transdermal every 72 hours  furosemide    Tablet 40 milliGRAM(s) Oral two times a day  gabapentin 400 milliGRAM(s) Oral three times a day  glucagon  Injectable 1 milliGRAM(s) IntraMuscular once PRN  guaiFENesin   Syrup  (Sugar-Free) 200 milliGRAM(s) Oral every 6 hours  influenza   Vaccine 0.5 milliLiter(s) IntraMuscular once  insulin glargine Injectable (LANTUS) 20 Unit(s) SubCutaneous at bedtime  insulin lispro (HumaLOG) corrective regimen sliding scale   SubCutaneous three times a day before meals  insulin lispro Injectable (HumaLOG) 10 Unit(s) SubCutaneous three times a day before meals  levothyroxine 137 MICROGram(s) Oral daily  naproxen 500 milliGRAM(s) Oral every 12 hours  nystatin Powder 1 Application(s) Topical two times a day  ondansetron Injectable 4 milliGRAM(s) IV Push every 6 hours PRN  oxyCODONE    IR 10 milliGRAM(s) Oral every 4 hours PRN  pantoprazole    Tablet 40 milliGRAM(s) Oral two times a day  polyethylene glycol 3350 17 Gram(s) Oral two times a day  predniSONE   Tablet 10 milliGRAM(s) Oral daily  primidone 50 milliGRAM(s) Oral daily  senna 2 Tablet(s) Oral at bedtime  sildenafil (REVATIO) 20 milliGRAM(s) Oral three times a day  simvastatin 20 milliGRAM(s) Oral at bedtime  sodium chloride 0.65% Nasal 1 Spray(s) Both Nostrils every 4 hours PRN  sucralfate 1 Gram(s) Oral four times a day  traMADol 50 milliGRAM(s) Oral two times a day        Creatinine Trend: 0.74<--, 0.71<--, 0.76<--, 0.86<--, 0.97<--, 1.00<--    COAGS:           T(C): 36.6 (04-05-19 @ 00:17), Max: 36.9 (04-04-19 @ 09:21)  HR: 98 (04-05-19 @ 05:46) (96 - 106)  BP: 146/82 (04-05-19 @ 00:17) (129/61 - 146/82)  RR: 18 (04-05-19 @ 00:17) (18 - 18)  SpO2: 100% (04-05-19 @ 05:46) (95% - 100%)  Wt(kg): --    I&O's Summary    04 Apr 2019 07:01  -  05 Apr 2019 07:00  --------------------------------------------------------  IN: 870 mL / OUT: 402 mL / NET: 468 mL      Gen: Appears well in NAD  HEENT:  (-)icterus (-)pallor  CV: N S1 S2 1/6 OJ (+)2 Pulses B/l  Resp:  Clear to ausculatation B/L, normal effort  GI: (+) BS Soft, NT, ND  Lymph:  (-)Edema, (-)obvious lymphadenopathy  Skin: Warm to touch, Normal turgor  Psych: Appropriate mood and affect      ECG:  	sinus Tachycardia 109 BPM, nonspecific T wave abnormality    RADIOLOGY:         CXR:  < from: Xray Chest 1 View- PORTABLE-Urgent (03.21.19 @ 11:18) >  The heart is enlarged. Improvingopacities in both lungs when compared to   previous study done on March 6, 2019. Those changes are compatible with   interstitial fluid however an infectious process cannot be ruled out   entirely. Severe degenerative changes of the right shoulder. Degenerative   changes of the thoracic spine is evident as well.    < end of copied text >      ECHO: pending < from: Transthoracic Echocardiogram (03.26.19 @ 10:29) >  Conclusions:  1. Increased relative wall thickness with normal left  ventricular mass index, consistent with concentric left  ventricular remodeling.  2. Hyperdynamic left ventricular systolic function.  3. Estimated pulmonary artery systolic pressure equals 42  mm Hg, assuming right atrial pressureequals 8 mm Hg,  consistent with mild pulmonary pressures.    < end of copied text >      ASSESSMENT/PLAN: 	63y Female  HTN, DM2, COPD/CHRIS on nocturnal CPAP, normal LV function un echo 1/19 moderate pulmonary HTN discharged yesterday from [facility] after a prolonged hospitalization for SBO requiring lysis of adhesions, complicated by hypercapneic respiratory failure requiring intubation now being treated for HCAP..    -  cont All meds for pulm htn   -  keep net neg with  lasix , fluid status improving   - 3/26 ECHO ,   hyperdynamic LV fx   - no further CV work up needed at present  D/W Dr Joseph

## 2019-04-05 NOTE — PROGRESS NOTE ADULT - ASSESSMENT
63yof w/ HTN, DM2, COPD/CHRIS on nocturnal CPAP, discharged from [facility] after a prolonged hospitalization for SBO requiring lysis of adhesions, complicated by hypercapneic respiratory failure requiring intubation. Pt awoke with shortness of breath and chest tightness, was supposed to use CPAP last night but nursing facility did not have the machine. Pt oxygen dependent on 4L at baseline. Feeling better now with supplemental oxygen. No fevers or chills. Occasional cough.  originally admitted on 12/15 for SBO s/p ex lap with lysis of adhesions (4 retention sutures) c/b 12/30 she eviscerated 2/2 a coughing episode s/p ex lap (6 retention sutures) c/b multifocal pneumonia, developed hypercapnic respiratory failure requiring intubation 1/6, s/p bronch 1/9/18, and extubated on 1/24, course further c/b Enterococci bacteremia now readmitted to MICU for hypoxic respiratory failure 2/2 atelectasis requiring intubation 01/31. Had grown ESBL in urine, s/p ertapenem.  Extubated to high flow/ BIPAP.

## 2019-04-05 NOTE — PROGRESS NOTE ADULT - ASSESSMENT
63 year old female with HTN, DM2, COPD/CHRIS on nocturnal CPAP, discharged 3/20 from [facility] after a prolonged hospitalization for SBO requiring lysis of adhesions, complicated by hypercapnic respiratory failure requiring intubation. GI consulted for anemia.

## 2019-04-05 NOTE — PROGRESS NOTE ADULT - ATTENDING COMMENTS
pt seems stable from pulm point of view: finished HAP  rx: on bipap at night: She is alert and awake: She would need follow up ct chest in 6 -8 weeks time: DC Planning:  3/31: PT IS STABLE FROM PULMONARY POINT OF VIEW: FINISHED ANTIBIOTICS: TOLERATING BIPAP  : no need for  sinuses ct inpt: Her breathing has been good: No wheezing: cont bd: As wellas bipap at night!  : stable  4/3: DC plannin/4: new findings of abdominal hernia per ct scan abdomen: reps wise she is stable: no intervention from pulmonary point of view: cont current rx:  : She seems to be stable from pulm point of view:

## 2019-04-05 NOTE — PROGRESS NOTE ADULT - ASSESSMENT
Problem/Plan - 1:  ·  Problem: HAP (hospital-acquired pneumonia).  Plan: finished antibiotics  id and pulmonary fu  will monitor.     Problem/Plan - 2:  ·  Problem: Chronic hypoxic respiratory failure , Oxygen dependent.  Plan: bipap at night   pulmonary fu.   no need for CT sinus as inpt     Problem/Plan - 3:  ·  Problem: abdominal pain Plan: wound care consult fu     Problem/Plan - 4:  ·  Problem: foot drop.  Plan: rheum fu appreciated  chronic   no input sherman as per neuro

## 2019-04-05 NOTE — PROGRESS NOTE ADULT - SUBJECTIVE AND OBJECTIVE BOX
Interval Events: pt s/e. she denies abdominal pain, n/v. Tolerating PO well.  no new GI events/complaints    MEDICATIONS  (STANDING):  ALBUTerol/ipratropium for Nebulization 3 milliLiter(s) Nebulizer every 6 hours  buDESOnide  80 MICROgram(s)/formoterol 4.5 MICROgram(s) Inhaler 2 Puff(s) Inhalation two times a day  dextrose 5%. 1000 milliLiter(s) (50 mL/Hr) IV Continuous <Continuous>  dextrose 50% Injectable 12.5 Gram(s) IV Push once  dextrose 50% Injectable 25 Gram(s) IV Push once  dextrose 50% Injectable 25 Gram(s) IV Push once  docusate sodium 100 milliGRAM(s) Oral three times a day  enoxaparin Injectable 40 milliGRAM(s) SubCutaneous every 24 hours  fentaNYL   Patch  25 MICROgram(s)/Hr 1 Patch Transdermal every 72 hours  furosemide    Tablet 40 milliGRAM(s) Oral two times a day  gabapentin 400 milliGRAM(s) Oral three times a day  guaiFENesin   Syrup  (Sugar-Free) 200 milliGRAM(s) Oral every 6 hours  influenza   Vaccine 0.5 milliLiter(s) IntraMuscular once  insulin glargine Injectable (LANTUS) 20 Unit(s) SubCutaneous at bedtime  insulin lispro (HumaLOG) corrective regimen sliding scale   SubCutaneous three times a day before meals  insulin lispro Injectable (HumaLOG) 10 Unit(s) SubCutaneous three times a day before meals  levothyroxine 137 MICROGram(s) Oral daily  naproxen 500 milliGRAM(s) Oral every 12 hours  nystatin Powder 1 Application(s) Topical two times a day  pantoprazole    Tablet 40 milliGRAM(s) Oral two times a day  polyethylene glycol 3350 17 Gram(s) Oral two times a day  predniSONE   Tablet 10 milliGRAM(s) Oral daily  primidone 50 milliGRAM(s) Oral daily  senna 2 Tablet(s) Oral at bedtime  sildenafil (REVATIO) 20 milliGRAM(s) Oral three times a day  simvastatin 20 milliGRAM(s) Oral at bedtime  sucralfate 1 Gram(s) Oral four times a day  traMADol 50 milliGRAM(s) Oral two times a day    MEDICATIONS  (PRN):  dextrose 40% Gel 15 Gram(s) Oral once PRN Blood Glucose LESS THAN 70 milliGRAM(s)/deciliter  glucagon  Injectable 1 milliGRAM(s) IntraMuscular once PRN Glucose LESS THAN 70 milligrams/deciliter  ondansetron Injectable 4 milliGRAM(s) IV Push every 6 hours PRN Nausea  oxyCODONE    IR 10 milliGRAM(s) Oral every 4 hours PRN Moderate Pain (4 - 6)  sodium chloride 0.65% Nasal 1 Spray(s) Both Nostrils every 4 hours PRN Nasal Congestion      Allergies    No Known Allergies    Intolerances    Seroquel (Other)      Review of Systems:    General:  No wt loss, fevers, chills, night sweats,fatigue,   Eyes:  Good vision, no reported pain  ENT:  No sore throat, pain, runny nose, dysphagia  CV:  No pain, palpitations, hypo/hypertension  Resp:  No dyspnea, cough, tachypnea, wheezing  GI:  No pain, No nausea, No vomiting, No diarrhea, No constipation, No weight loss, No fever, No pruritis, No rectal bleeding, No melena, No dysphagia  :  No pain, bleeding, incontinence, nocturia  Muscle:  No pain, weakness  Neuro:  No weakness, tingling, memory problems  Psych:  No fatigue, insomnia, mood problems, depression  Endocrine:  No polyuria, polydypsia, cold/heat intolerance  Heme:  No petechiae, ecchymosis, easy bruisability  Skin:  No rash, tattoos, scars, edema      Vital Signs Last 24 Hrs  T(C): 36.8 (05 Apr 2019 08:35), Max: 36.9 (04 Apr 2019 09:21)  T(F): 98.2 (05 Apr 2019 08:35), Max: 98.5 (04 Apr 2019 09:21)  HR: 105 (05 Apr 2019 08:35) (96 - 106)  BP: 158/88 (05 Apr 2019 08:35) (129/61 - 158/88)  BP(mean): --  RR: 18 (05 Apr 2019 08:35) (18 - 18)  SpO2: 95% (05 Apr 2019 08:35) (95% - 100%)    PHYSICAL EXAM:    Constitutional: NAD, well-developed  HEENT: EOMI, throat clear  Neck: No LAD, supple  Respiratory: CTA and P  Cardiovascular: S1 and S2, RRR, no M  Gastrointestinal: soft, NTND, midline incision c/d/i, no rebound or guarding  Extremities: No peripheral edema, neg clubing, cyanosis  Vascular: 2+ peripheral pulses  Neurological: A/O x 3, no focal deficits  Psychiatric: Normal mood, normal affect  Skin: No rashes      LABS:                RADIOLOGY & ADDITIONAL TESTS:

## 2019-04-05 NOTE — PROGRESS NOTE ADULT - SUBJECTIVE AND OBJECTIVE BOX
Patient is a 63y old  Female who presents with a chief complaint of sob (05 Apr 2019 09:57)      INTERVAL HPI/OVERNIGHT EVENTS:  T(C): 36.7 (04-05-19 @ 15:31), Max: 36.8 (04-05-19 @ 08:35)  HR: 103 (04-05-19 @ 15:31) (95 - 106)  BP: 106/67 (04-05-19 @ 15:31) (106/67 - 158/88)  RR: 17 (04-05-19 @ 15:31) (17 - 18)  SpO2: 100% (04-05-19 @ 15:31) (95% - 100%)  Wt(kg): --  I&O's Summary    04 Apr 2019 07:01  -  05 Apr 2019 07:00  --------------------------------------------------------  IN: 870 mL / OUT: 402 mL / NET: 468 mL    05 Apr 2019 07:01  -  05 Apr 2019 17:58  --------------------------------------------------------  IN: 1250 mL / OUT: 400 mL / NET: 850 mL        LABS:                        10.4   12.4  )-----------( 400      ( 05 Apr 2019 09:01 )             32.0     04-05    139  |  100  |  35<H>  ----------------------------<  147<H>  4.0   |  24  |  1.03    Ca    10.1      05 Apr 2019 08:55          CAPILLARY BLOOD GLUCOSE      POCT Blood Glucose.: 217 mg/dL (05 Apr 2019 17:11)  POCT Blood Glucose.: 174 mg/dL (05 Apr 2019 12:21)  POCT Blood Glucose.: 157 mg/dL (05 Apr 2019 08:28)  POCT Blood Glucose.: 87 mg/dL (04 Apr 2019 21:26)            MEDICATIONS  (STANDING):  ALBUTerol/ipratropium for Nebulization 3 milliLiter(s) Nebulizer every 6 hours  buDESOnide  80 MICROgram(s)/formoterol 4.5 MICROgram(s) Inhaler 2 Puff(s) Inhalation two times a day  dextrose 5%. 1000 milliLiter(s) (50 mL/Hr) IV Continuous <Continuous>  dextrose 50% Injectable 12.5 Gram(s) IV Push once  dextrose 50% Injectable 25 Gram(s) IV Push once  dextrose 50% Injectable 25 Gram(s) IV Push once  docusate sodium 100 milliGRAM(s) Oral three times a day  enoxaparin Injectable 40 milliGRAM(s) SubCutaneous every 24 hours  fentaNYL   Patch  25 MICROgram(s)/Hr 1 Patch Transdermal every 72 hours  furosemide    Tablet 40 milliGRAM(s) Oral two times a day  gabapentin 400 milliGRAM(s) Oral three times a day  guaiFENesin   Syrup  (Sugar-Free) 200 milliGRAM(s) Oral every 6 hours  influenza   Vaccine 0.5 milliLiter(s) IntraMuscular once  insulin glargine Injectable (LANTUS) 20 Unit(s) SubCutaneous at bedtime  insulin lispro (HumaLOG) corrective regimen sliding scale   SubCutaneous three times a day before meals  insulin lispro Injectable (HumaLOG) 10 Unit(s) SubCutaneous three times a day before meals  levothyroxine 137 MICROGram(s) Oral daily  naproxen 500 milliGRAM(s) Oral every 12 hours  nystatin Powder 1 Application(s) Topical two times a day  pantoprazole    Tablet 40 milliGRAM(s) Oral two times a day  polyethylene glycol 3350 17 Gram(s) Oral two times a day  predniSONE   Tablet 10 milliGRAM(s) Oral daily  primidone 50 milliGRAM(s) Oral daily  senna 2 Tablet(s) Oral at bedtime  sildenafil (REVATIO) 20 milliGRAM(s) Oral three times a day  simvastatin 20 milliGRAM(s) Oral at bedtime  sucralfate 1 Gram(s) Oral four times a day  traMADol 50 milliGRAM(s) Oral two times a day    MEDICATIONS  (PRN):  dextrose 40% Gel 15 Gram(s) Oral once PRN Blood Glucose LESS THAN 70 milliGRAM(s)/deciliter  glucagon  Injectable 1 milliGRAM(s) IntraMuscular once PRN Glucose LESS THAN 70 milligrams/deciliter  LORazepam     Tablet 0.5 milliGRAM(s) Oral once PRN Claustrophobia  ondansetron Injectable 4 milliGRAM(s) IV Push every 6 hours PRN Nausea  oxyCODONE    IR 10 milliGRAM(s) Oral every 4 hours PRN Moderate Pain (4 - 6)  sodium chloride 0.65% Nasal 1 Spray(s) Both Nostrils every 4 hours PRN Nasal Congestion          PHYSICAL EXAM:  GENERAL: NAD, well-groomed, well-developed  HEAD:  Atraumatic, Normocephalic  CHEST/LUNG: Clear to percussion bilaterally; No rales, rhonchi, wheezing, or rubs  HEART: Regular rate and rhythm; No murmurs, rubs, or gallops  ABDOMEN: Soft, Nontender, Nondistended; Bowel sounds present  EXTREMITIES:  2+ Peripheral Pulses, No clubbing, cyanosis, or edema  LYMPH: No lymphadenopathy noted  SKIN: No rashes or lesions    Care Discussed with Consultants/Other Providers [+ ] YES  [ ] NO

## 2019-04-05 NOTE — CHART NOTE - NSCHARTNOTEFT_GEN_A_CORE
Refusing Lovenox    Notified by Day RN that patient refused her Lovenox. Patient states that she will take it every other day and that she "circulates her legs", I know the Lovenox is a blood thinner".  Discussed with patient the risk and benefits of refusing the Lovenox including death, verbalized understanding and repeated back.    Will endorse to primary day team, attending to follow    Elizabeth Foster NP  [facility] [id number]

## 2019-04-06 LAB
GLUCOSE BLDC GLUCOMTR-MCNC: 112 MG/DL — HIGH (ref 70–99)
GLUCOSE BLDC GLUCOMTR-MCNC: 129 MG/DL — HIGH (ref 70–99)
GLUCOSE BLDC GLUCOMTR-MCNC: 161 MG/DL — HIGH (ref 70–99)
GLUCOSE BLDC GLUCOMTR-MCNC: 200 MG/DL — HIGH (ref 70–99)

## 2019-04-06 RX ADMIN — GABAPENTIN 400 MILLIGRAM(S): 400 CAPSULE ORAL at 06:25

## 2019-04-06 RX ADMIN — Medication 1: at 13:17

## 2019-04-06 RX ADMIN — Medication 200 MILLIGRAM(S): at 06:24

## 2019-04-06 RX ADMIN — ENOXAPARIN SODIUM 40 MILLIGRAM(S): 100 INJECTION SUBCUTANEOUS at 16:45

## 2019-04-06 RX ADMIN — Medication 1 GRAM(S): at 16:48

## 2019-04-06 RX ADMIN — FENTANYL CITRATE 1 PATCH: 50 INJECTION INTRAVENOUS at 08:27

## 2019-04-06 RX ADMIN — TRAMADOL HYDROCHLORIDE 50 MILLIGRAM(S): 50 TABLET ORAL at 16:49

## 2019-04-06 RX ADMIN — SENNA PLUS 2 TABLET(S): 8.6 TABLET ORAL at 21:37

## 2019-04-06 RX ADMIN — Medication 10 MILLIGRAM(S): at 06:25

## 2019-04-06 RX ADMIN — Medication 20 MILLIGRAM(S): at 13:21

## 2019-04-06 RX ADMIN — INSULIN GLARGINE 20 UNIT(S): 100 INJECTION, SOLUTION SUBCUTANEOUS at 21:36

## 2019-04-06 RX ADMIN — PANTOPRAZOLE SODIUM 40 MILLIGRAM(S): 20 TABLET, DELAYED RELEASE ORAL at 06:28

## 2019-04-06 RX ADMIN — Medication 3 MILLILITER(S): at 16:50

## 2019-04-06 RX ADMIN — Medication 20 MILLIGRAM(S): at 21:36

## 2019-04-06 RX ADMIN — Medication 10 UNIT(S): at 13:18

## 2019-04-06 RX ADMIN — BUDESONIDE AND FORMOTEROL FUMARATE DIHYDRATE 2 PUFF(S): 160; 4.5 AEROSOL RESPIRATORY (INHALATION) at 06:24

## 2019-04-06 RX ADMIN — Medication 10 UNIT(S): at 17:31

## 2019-04-06 RX ADMIN — OXYCODONE HYDROCHLORIDE 10 MILLIGRAM(S): 5 TABLET ORAL at 06:58

## 2019-04-06 RX ADMIN — Medication 40 MILLIGRAM(S): at 16:49

## 2019-04-06 RX ADMIN — Medication 500 MILLIGRAM(S): at 06:25

## 2019-04-06 RX ADMIN — OXYCODONE HYDROCHLORIDE 10 MILLIGRAM(S): 5 TABLET ORAL at 13:19

## 2019-04-06 RX ADMIN — NYSTATIN CREAM 1 APPLICATION(S): 100000 CREAM TOPICAL at 16:46

## 2019-04-06 RX ADMIN — Medication 1 GRAM(S): at 23:01

## 2019-04-06 RX ADMIN — PANTOPRAZOLE SODIUM 40 MILLIGRAM(S): 20 TABLET, DELAYED RELEASE ORAL at 16:49

## 2019-04-06 RX ADMIN — OXYCODONE HYDROCHLORIDE 10 MILLIGRAM(S): 5 TABLET ORAL at 01:00

## 2019-04-06 RX ADMIN — Medication 200 MILLIGRAM(S): at 23:01

## 2019-04-06 RX ADMIN — Medication 20 MILLIGRAM(S): at 06:28

## 2019-04-06 RX ADMIN — Medication 1 GRAM(S): at 06:25

## 2019-04-06 RX ADMIN — BUDESONIDE AND FORMOTEROL FUMARATE DIHYDRATE 2 PUFF(S): 160; 4.5 AEROSOL RESPIRATORY (INHALATION) at 16:51

## 2019-04-06 RX ADMIN — Medication 10 UNIT(S): at 08:56

## 2019-04-06 RX ADMIN — Medication 200 MILLIGRAM(S): at 13:17

## 2019-04-06 RX ADMIN — PRIMIDONE 50 MILLIGRAM(S): 250 TABLET ORAL at 13:19

## 2019-04-06 RX ADMIN — Medication 40 MILLIGRAM(S): at 06:25

## 2019-04-06 RX ADMIN — TRAMADOL HYDROCHLORIDE 50 MILLIGRAM(S): 50 TABLET ORAL at 06:28

## 2019-04-06 RX ADMIN — OXYCODONE HYDROCHLORIDE 10 MILLIGRAM(S): 5 TABLET ORAL at 14:00

## 2019-04-06 RX ADMIN — Medication 100 MILLIGRAM(S): at 21:36

## 2019-04-06 RX ADMIN — Medication 1 GRAM(S): at 13:18

## 2019-04-06 RX ADMIN — GABAPENTIN 400 MILLIGRAM(S): 400 CAPSULE ORAL at 21:36

## 2019-04-06 RX ADMIN — FENTANYL CITRATE 1 PATCH: 50 INJECTION INTRAVENOUS at 19:58

## 2019-04-06 RX ADMIN — POLYETHYLENE GLYCOL 3350 17 GRAM(S): 17 POWDER, FOR SOLUTION ORAL at 16:49

## 2019-04-06 RX ADMIN — Medication 100 MILLIGRAM(S): at 13:19

## 2019-04-06 RX ADMIN — OXYCODONE HYDROCHLORIDE 10 MILLIGRAM(S): 5 TABLET ORAL at 19:56

## 2019-04-06 RX ADMIN — Medication 3 MILLILITER(S): at 06:24

## 2019-04-06 RX ADMIN — OXYCODONE HYDROCHLORIDE 10 MILLIGRAM(S): 5 TABLET ORAL at 06:35

## 2019-04-06 RX ADMIN — NYSTATIN CREAM 1 APPLICATION(S): 100000 CREAM TOPICAL at 06:29

## 2019-04-06 RX ADMIN — Medication 100 MILLIGRAM(S): at 06:25

## 2019-04-06 RX ADMIN — OXYCODONE HYDROCHLORIDE 10 MILLIGRAM(S): 5 TABLET ORAL at 21:00

## 2019-04-06 RX ADMIN — Medication 137 MICROGRAM(S): at 06:25

## 2019-04-06 RX ADMIN — GABAPENTIN 400 MILLIGRAM(S): 400 CAPSULE ORAL at 13:19

## 2019-04-06 RX ADMIN — SIMVASTATIN 20 MILLIGRAM(S): 20 TABLET, FILM COATED ORAL at 21:36

## 2019-04-06 RX ADMIN — Medication 1: at 08:56

## 2019-04-06 RX ADMIN — Medication 3 MILLILITER(S): at 13:17

## 2019-04-06 NOTE — PROGRESS NOTE ADULT - ASSESSMENT
Problem/Plan - 1:  ·  Problem: HAP (hospital-acquired pneumonia).  Plan: finished antibiotics  id and pulmonary fu  will monitor.     Problem/Plan - 2:  ·  Problem: Chronic hypoxic respiratory failure , Oxygen dependent.  Plan: bipap at night   pulmonary fu.     Problem/Plan - 3:  ·  Problem: abdominal pain Plan: wound care consult fu     Problem/Plan - 4:  ·  Problem: foot drop.  Plan: rheum fu appreciated  chronic   check CT lumbar spine     dc planning

## 2019-04-06 NOTE — PROGRESS NOTE ADULT - ASSESSMENT
63 year old female with HTN, DM2, COPD/CHRIS on nocturnal CPAP, discharged 3/20 from [facility] after a prolonged hospitalization for SBO requiring lysis of adhesions, complicated by hypercapnic respiratory failure requiring intubation. GI consulted for anemia.

## 2019-04-06 NOTE — PROGRESS NOTE ADULT - SUBJECTIVE AND OBJECTIVE BOX
pt seen and examined,  no  chest pain ,    ALBUTerol/ipratropium for Nebulization 3 milliLiter(s) Nebulizer every 6 hours  buDESOnide  80 MICROgram(s)/formoterol 4.5 MICROgram(s) Inhaler 2 Puff(s) Inhalation two times a day  dextrose 40% Gel 15 Gram(s) Oral once PRN  dextrose 5%. 1000 milliLiter(s) IV Continuous <Continuous>  dextrose 50% Injectable 12.5 Gram(s) IV Push once  dextrose 50% Injectable 25 Gram(s) IV Push once  dextrose 50% Injectable 25 Gram(s) IV Push once  docusate sodium 100 milliGRAM(s) Oral three times a day  enoxaparin Injectable 40 milliGRAM(s) SubCutaneous every 24 hours  fentaNYL   Patch  25 MICROgram(s)/Hr 1 Patch Transdermal every 72 hours  furosemide    Tablet 40 milliGRAM(s) Oral two times a day  gabapentin 400 milliGRAM(s) Oral three times a day  glucagon  Injectable 1 milliGRAM(s) IntraMuscular once PRN  guaiFENesin   Syrup  (Sugar-Free) 200 milliGRAM(s) Oral every 6 hours  influenza   Vaccine 0.5 milliLiter(s) IntraMuscular once  insulin glargine Injectable (LANTUS) 20 Unit(s) SubCutaneous at bedtime  insulin lispro (HumaLOG) corrective regimen sliding scale   SubCutaneous three times a day before meals  insulin lispro Injectable (HumaLOG) 10 Unit(s) SubCutaneous three times a day before meals  levothyroxine 137 MICROGram(s) Oral daily  LORazepam     Tablet 0.5 milliGRAM(s) Oral once PRN  nystatin Powder 1 Application(s) Topical two times a day  ondansetron Injectable 4 milliGRAM(s) IV Push every 6 hours PRN  oxyCODONE    IR 10 milliGRAM(s) Oral every 4 hours PRN  pantoprazole    Tablet 40 milliGRAM(s) Oral two times a day  polyethylene glycol 3350 17 Gram(s) Oral two times a day  predniSONE   Tablet 10 milliGRAM(s) Oral daily  primidone 50 milliGRAM(s) Oral daily  senna 2 Tablet(s) Oral at bedtime  sildenafil (REVATIO) 20 milliGRAM(s) Oral three times a day  simvastatin 20 milliGRAM(s) Oral at bedtime  sodium chloride 0.65% Nasal 1 Spray(s) Both Nostrils every 4 hours PRN  sucralfate 1 Gram(s) Oral four times a day  traMADol 50 milliGRAM(s) Oral two times a day                            10.4   12.4  )-----------( 400      ( 05 Apr 2019 09:01 )             32.0       Hemoglobin: 10.4 g/dL (04-05 @ 09:01)      04-05    139  |  100  |  35<H>  ----------------------------<  147<H>  4.0   |  24  |  1.03    Ca    10.1      05 Apr 2019 08:55      Creatinine Trend: 1.03<--, 0.74<--, 0.71<--, 0.76<--, 0.86<--, 0.97<--    COAGS:           T(C): 37.1 (04-06-19 @ 08:34), Max: 37.1 (04-06-19 @ 08:34)  HR: 111 (04-06-19 @ 08:34) (86 - 111)  BP: 104/57 (04-06-19 @ 08:34) (104/57 - 128/83)  RR: 18 (04-06-19 @ 08:34) (17 - 18)  SpO2: 100% (04-06-19 @ 08:34) (97% - 100%)  Wt(kg): --    I&O's Summary    05 Apr 2019 07:01  -  06 Apr 2019 07:00  --------------------------------------------------------  IN: 1710 mL / OUT: 600 mL / NET: 1110 mL    	    Gen: Appears well in NAD  HEENT:  (-)icterus (-)pallor  CV: N S1 S2 1/6 OJ (+)2 Pulses B/l  Resp:  Clear to ausculatation B/L, normal effort  GI: (+) BS Soft, NT, ND  Lymph:  (-)Edema, (-)obvious lymphadenopathy  Skin: Warm to touch, Normal turgor  Psych: Appropriate mood and affect      ECG:  	sinus Tachycardia 109 BPM, nonspecific T wave abnormality    RADIOLOGY:         CXR:  < from: Xray Chest 1 View- PORTABLE-Urgent (03.21.19 @ 11:18) >  The heart is enlarged. Improvingopacities in both lungs when compared to   previous study done on March 6, 2019. Those changes are compatible with   interstitial fluid however an infectious process cannot be ruled out   entirely. Severe degenerative changes of the right shoulder. Degenerative   changes of the thoracic spine is evident as well.    < end of copied text >      ECHO: pending < from: Transthoracic Echocardiogram (03.26.19 @ 10:29) >  Conclusions:  1. Increased relative wall thickness with normal left  ventricular mass index, consistent with concentric left  ventricular remodeling.  2. Hyperdynamic left ventricular systolic function.  3. Estimated pulmonary artery systolic pressure equals 42  mm Hg, assuming right atrial pressureequals 8 mm Hg,  consistent with mild pulmonary pressures.    < end of copied text >      ASSESSMENT/PLAN: 	63y Female  HTN, DM2, COPD/CHRIS on nocturnal CPAP, normal LV function un echo 1/19 moderate pulmonary HTN discharged yesterday from [facility] after a prolonged hospitalization for SBO requiring lysis of adhesions, complicated by hypercapneic respiratory failure requiring intubation now being treated for HCAP..    -  cont All meds for pulm htn   -  keep net neg with  lasix , fluid status improving   - 3/26 ECHO ,   hyperdynamic LV fx   - no further CV work up needed at present  D/W Dr Joseph

## 2019-04-06 NOTE — PROGRESS NOTE ADULT - SUBJECTIVE AND OBJECTIVE BOX
INTERVAL HPI/OVERNIGHT EVENTS:  No new overnight event.  No N/V/D.  Tolerating diet.    Allergies    No Known Allergies    Intolerances    Seroquel (Other)    General:  No wt loss, fevers, chills, night sweats, fatigue,   Eyes:  Good vision, no reported pain  ENT:  No sore throat, pain, runny nose, dysphagia  CV:  No pain, palpitations, hypo/hypertension  Resp:  No dyspnea, cough, tachypnea, wheezing  GI:  No pain, No nausea, No vomiting, No diarrhea, No constipation, No weight loss, No fever, No pruritis, No rectal bleeding, No tarry stools, No dysphagia,  :  No pain, bleeding, incontinence, nocturia  Muscle:  No pain, weakness  Neuro:  No weakness, tingling, memory problems  Psych:  No fatigue, insomnia, mood problems, depression  Endocrine:  No polyuria, polydipsia, cold/heat intolerance  Heme:  No petechiae, ecchymosis, easy bruisability  Skin:  No rash, tattoos, scars, edema      PHYSICAL EXAM:   Vital Signs:  Vital Signs Last 24 Hrs  T(C): 37 (06 Apr 2019 15:52), Max: 37.1 (06 Apr 2019 08:34)  T(F): 98.6 (06 Apr 2019 15:52), Max: 98.7 (06 Apr 2019 08:34)  HR: 109 (06 Apr 2019 15:52) (86 - 111)  BP: 126/63 (06 Apr 2019 15:52) (104/57 - 128/83)  BP(mean): --  RR: 18 (06 Apr 2019 15:52) (18 - 18)  SpO2: 99% (06 Apr 2019 15:52) (97% - 100%)  Daily     Daily I&O's Summary    05 Apr 2019 07:01  -  06 Apr 2019 07:00  --------------------------------------------------------  IN: 1710 mL / OUT: 600 mL / NET: 1110 mL    06 Apr 2019 07:01  -  06 Apr 2019 16:00  --------------------------------------------------------  IN: 950 mL / OUT: 0 mL / NET: 950 mL        GENERAL:  Appears stated age, well-groomed, well-nourished, no distress  HEENT:  NC/AT,  conjunctivae clear and pink, no thyromegaly, nodules, adenopathy, no JVD, sclera -anicteric  CHEST:  Full & symmetric excursion, no increased effort, breath sounds clear  HEART:  Regular rhythm, S1, S2, no murmur/rub/S3/S4, no abdominal bruit, no edema  ABDOMEN:  Soft, non-tender, non-distended, normoactive bowel sounds,  no masses ,no hepato-splenomegaly, no signs of chronic liver disease  EXTEREMITIES:  no cyanosis,clubbing or edema  SKIN:  No rash/erythema/ecchymoses/petechiae/wounds/abscess/warm/dry  NEURO:  Alert, oriented, no asterixis, no tremor, no encephalopathy      LABS:                        10.4   12.4  )-----------( 400      ( 05 Apr 2019 09:01 )             32.0     04-05    139  |  100  |  35<H>  ----------------------------<  147<H>  4.0   |  24  |  1.03    Ca    10.1      05 Apr 2019 08:55          amylase   lipase  RADIOLOGY & ADDITIONAL TESTS:

## 2019-04-06 NOTE — PROGRESS NOTE ADULT - ASSESSMENT
63yof w/ HTN, DM2, COPD/CHRIS on nocturnal CPAP, discharged from [facility] after a prolonged hospitalization for SBO requiring lysis of adhesions, complicated by hypercapneic respiratory failure requiring intubation. Pt awoke with shortness of breath and chest tightness, was supposed to use CPAP last night but nursing facility did not have the machine. Pt oxygen dependent on 4L at baseline. Feeling better now with supplemental oxygen. No fevers or chills. Occasional cough.  originally admitted on 12/15 for SBO s/p ex lap with lysis of adhesions (4 retention sutures) c/b 12/30 she eviscerated 2/2 a coughing episode s/p ex lap (6 retention sutures) c/b multifocal pneumonia, developed hypercapnic respiratory failure requiring intubation 1/6, s/p bronch 1/9/18, and extubated on 1/24, course further c/b Enterococci bacteremia now readmitted to MICU for hypoxic respiratory failure 2/2 atelectasis requiring intubation 01/31. Had grown ESBL in urine, s/p ertapenem.  Extubated to high flow/ BIPAP.

## 2019-04-06 NOTE — PROGRESS NOTE ADULT - SUBJECTIVE AND OBJECTIVE BOX
Patient is a 63y old  Female who presents with a chief complaint of sob (06 Apr 2019 15:59)      INTERVAL HPI/OVERNIGHT EVENTS:  T(C): 37 (04-06-19 @ 15:52), Max: 37.1 (04-06-19 @ 08:34)  HR: 109 (04-06-19 @ 15:52) (86 - 111)  BP: 126/63 (04-06-19 @ 15:52) (104/57 - 128/83)  RR: 18 (04-06-19 @ 15:52) (18 - 18)  SpO2: 99% (04-06-19 @ 15:52) (97% - 100%)  Wt(kg): --  I&O's Summary    05 Apr 2019 07:01  -  06 Apr 2019 07:00  --------------------------------------------------------  IN: 1710 mL / OUT: 600 mL / NET: 1110 mL    06 Apr 2019 07:01  -  06 Apr 2019 19:06  --------------------------------------------------------  IN: 950 mL / OUT: 0 mL / NET: 950 mL        LABS:                        10.4   12.4  )-----------( 400      ( 05 Apr 2019 09:01 )             32.0     04-05    139  |  100  |  35<H>  ----------------------------<  147<H>  4.0   |  24  |  1.03    Ca    10.1      05 Apr 2019 08:55          CAPILLARY BLOOD GLUCOSE      POCT Blood Glucose.: 112 mg/dL (06 Apr 2019 16:52)  POCT Blood Glucose.: 200 mg/dL (06 Apr 2019 12:28)  POCT Blood Glucose.: 161 mg/dL (06 Apr 2019 08:30)  POCT Blood Glucose.: 102 mg/dL (05 Apr 2019 21:16)            MEDICATIONS  (STANDING):  ALBUTerol/ipratropium for Nebulization 3 milliLiter(s) Nebulizer every 6 hours  buDESOnide  80 MICROgram(s)/formoterol 4.5 MICROgram(s) Inhaler 2 Puff(s) Inhalation two times a day  dextrose 5%. 1000 milliLiter(s) (50 mL/Hr) IV Continuous <Continuous>  dextrose 50% Injectable 12.5 Gram(s) IV Push once  dextrose 50% Injectable 25 Gram(s) IV Push once  dextrose 50% Injectable 25 Gram(s) IV Push once  docusate sodium 100 milliGRAM(s) Oral three times a day  enoxaparin Injectable 40 milliGRAM(s) SubCutaneous every 24 hours  fentaNYL   Patch  25 MICROgram(s)/Hr 1 Patch Transdermal every 72 hours  furosemide    Tablet 40 milliGRAM(s) Oral two times a day  gabapentin 400 milliGRAM(s) Oral three times a day  guaiFENesin   Syrup  (Sugar-Free) 200 milliGRAM(s) Oral every 6 hours  influenza   Vaccine 0.5 milliLiter(s) IntraMuscular once  insulin glargine Injectable (LANTUS) 20 Unit(s) SubCutaneous at bedtime  insulin lispro (HumaLOG) corrective regimen sliding scale   SubCutaneous three times a day before meals  insulin lispro Injectable (HumaLOG) 10 Unit(s) SubCutaneous three times a day before meals  levothyroxine 137 MICROGram(s) Oral daily  nystatin Powder 1 Application(s) Topical two times a day  pantoprazole    Tablet 40 milliGRAM(s) Oral two times a day  polyethylene glycol 3350 17 Gram(s) Oral two times a day  predniSONE   Tablet 10 milliGRAM(s) Oral daily  primidone 50 milliGRAM(s) Oral daily  senna 2 Tablet(s) Oral at bedtime  sildenafil (REVATIO) 20 milliGRAM(s) Oral three times a day  simvastatin 20 milliGRAM(s) Oral at bedtime  sucralfate 1 Gram(s) Oral four times a day  traMADol 50 milliGRAM(s) Oral two times a day    MEDICATIONS  (PRN):  dextrose 40% Gel 15 Gram(s) Oral once PRN Blood Glucose LESS THAN 70 milliGRAM(s)/deciliter  glucagon  Injectable 1 milliGRAM(s) IntraMuscular once PRN Glucose LESS THAN 70 milligrams/deciliter  LORazepam     Tablet 0.5 milliGRAM(s) Oral once PRN Claustrophobia  ondansetron Injectable 4 milliGRAM(s) IV Push every 6 hours PRN Nausea  oxyCODONE    IR 10 milliGRAM(s) Oral every 4 hours PRN Moderate Pain (4 - 6)  sodium chloride 0.65% Nasal 1 Spray(s) Both Nostrils every 4 hours PRN Nasal Congestion          PHYSICAL EXAM:  GENERAL: NAD, well-groomed, well-developed  HEAD:  Atraumatic, Normocephalic  CHEST/LUNG: Clear to percussion bilaterally; No rales, rhonchi, wheezing, or rubs  HEART: Regular rate and rhythm; No murmurs, rubs, or gallops  ABDOMEN: Soft, Nontender, Nondistended; Bowel sounds present  EXTREMITIES:  2+ Peripheral Pulses, No clubbing, cyanosis, or edema  LYMPH: No lymphadenopathy noted  SKIN: No rashes or lesions    Care Discussed with Consultants/Other Providers [ ] YES  [ ] NO

## 2019-04-06 NOTE — PROGRESS NOTE ADULT - SUBJECTIVE AND OBJECTIVE BOX
Patient is a 63y old  Female who presents with a chief complaint of sob (06 Apr 2019 08:38)    Any change in ROS: SOB alleviated, occasional dry coughs. denies chest pain.     MEDICATIONS  (STANDING):  ALBUTerol/ipratropium for Nebulization 3 milliLiter(s) Nebulizer every 6 hours  buDESOnide  80 MICROgram(s)/formoterol 4.5 MICROgram(s) Inhaler 2 Puff(s) Inhalation two times a day  dextrose 5%. 1000 milliLiter(s) (50 mL/Hr) IV Continuous <Continuous>  dextrose 50% Injectable 12.5 Gram(s) IV Push once  dextrose 50% Injectable 25 Gram(s) IV Push once  dextrose 50% Injectable 25 Gram(s) IV Push once  docusate sodium 100 milliGRAM(s) Oral three times a day  enoxaparin Injectable 40 milliGRAM(s) SubCutaneous every 24 hours  fentaNYL   Patch  25 MICROgram(s)/Hr 1 Patch Transdermal every 72 hours  furosemide    Tablet 40 milliGRAM(s) Oral two times a day  gabapentin 400 milliGRAM(s) Oral three times a day  guaiFENesin   Syrup  (Sugar-Free) 200 milliGRAM(s) Oral every 6 hours  influenza   Vaccine 0.5 milliLiter(s) IntraMuscular once  insulin glargine Injectable (LANTUS) 20 Unit(s) SubCutaneous at bedtime  insulin lispro (HumaLOG) corrective regimen sliding scale   SubCutaneous three times a day before meals  insulin lispro Injectable (HumaLOG) 10 Unit(s) SubCutaneous three times a day before meals  levothyroxine 137 MICROGram(s) Oral daily  nystatin Powder 1 Application(s) Topical two times a day  pantoprazole    Tablet 40 milliGRAM(s) Oral two times a day  polyethylene glycol 3350 17 Gram(s) Oral two times a day  predniSONE   Tablet 10 milliGRAM(s) Oral daily  primidone 50 milliGRAM(s) Oral daily  senna 2 Tablet(s) Oral at bedtime  sildenafil (REVATIO) 20 milliGRAM(s) Oral three times a day  simvastatin 20 milliGRAM(s) Oral at bedtime  sucralfate 1 Gram(s) Oral four times a day  traMADol 50 milliGRAM(s) Oral two times a day    MEDICATIONS  (PRN):  dextrose 40% Gel 15 Gram(s) Oral once PRN Blood Glucose LESS THAN 70 milliGRAM(s)/deciliter  glucagon  Injectable 1 milliGRAM(s) IntraMuscular once PRN Glucose LESS THAN 70 milligrams/deciliter  LORazepam     Tablet 0.5 milliGRAM(s) Oral once PRN Claustrophobia  ondansetron Injectable 4 milliGRAM(s) IV Push every 6 hours PRN Nausea  oxyCODONE    IR 10 milliGRAM(s) Oral every 4 hours PRN Moderate Pain (4 - 6)  sodium chloride 0.65% Nasal 1 Spray(s) Both Nostrils every 4 hours PRN Nasal Congestion    Vital Signs Last 24 Hrs  T(C): 37.1 (06 Apr 2019 08:34), Max: 37.1 (06 Apr 2019 08:34)  T(F): 98.7 (06 Apr 2019 08:34), Max: 98.7 (06 Apr 2019 08:34)  HR: 111 (06 Apr 2019 08:34) (86 - 111)  BP: 104/57 (06 Apr 2019 08:34) (104/57 - 128/83)  BP(mean): --  RR: 18 (06 Apr 2019 08:34) (17 - 18)  SpO2: 100% (06 Apr 2019 08:34) (97% - 100%)    I&O's Summary    05 Apr 2019 07:01  -  06 Apr 2019 07:00  --------------------------------------------------------  IN: 1710 mL / OUT: 600 mL / NET: 1110 mL          Physical Exam:   GENERAL: The patient comfortable with no apparent distress.   HEENT: Head is normocephalic and atraumatic.    NECK: Supple with no elevated JVP.  LUNGS: posterior LLL crackles otherwise clear   HEART: S1 and S2 present without murmur.  ABDOMEN: Soft, nontender, and nondistended.   EXTREMITIES: No edema or calf tenderness.  NEUROLOGIC: awake, alert, oriented x3  right foot drop.     Labs:                              10.4   12.4  )-----------( 400      ( 05 Apr 2019 09:01 )             32.0     04-05    139  |  100  |  35<H>  ----------------------------<  147<H>  4.0   |  24  |  1.03  04-03    140  |  97  |  26<H>  ----------------------------<  110<H>  4.0   |  25  |  0.74    Ca    10.1      05 Apr 2019 08:55      CAPILLARY BLOOD GLUCOSE      POCT Blood Glucose.: 161 mg/dL (06 Apr 2019 08:30)  POCT Blood Glucose.: 102 mg/dL (05 Apr 2019 21:16)  POCT Blood Glucose.: 217 mg/dL (05 Apr 2019 17:11)  POCT Blood Glucose.: 174 mg/dL (05 Apr 2019 12:21)        Studies  Chest X-RAY   < from: Xray Chest 1 View- PORTABLE-Urgent (03.27.19 @ 11:47) >    EXAM:  XR CHEST PORTABLE URGENT 1V                            PROCEDURE DATE:  03/27/2019            INTERPRETATION:  XR CHEST PORTABLE URGENT 1V    INDICATION: Shortness of breath    COMPARISON: 3/21/2019    FINDINGS/  IMPRESSION:    Pulmonary edema is slightly increased. Small bilateral pleural effusions.   No pneumothorax.    The cardiac silhouette remains enlarged. Severe degenerative changes of   the right shoulder.      < end of copied text >    CT SCAN Chest   < from: CT Angio Chest w/ IV Cont (03.21.19 @ 11:03) >    EXAM:  CT ANGIO CHEST (W)AW IC                            PROCEDURE DATE:  03/21/2019            INTERPRETATION:  Clinical information: Shortness of breath and chest   pain. Exam is compared to prior CT scan of 1/2/2013 as well as prior CT   scan of the abdomen of 1/12/2019.    CT angiogram of the chest was obtained following administration of   intravenous contrast. Approximately 90 cc of Omnipaque 350 was   administered and 10 cc was discarded. Coronal, sagittal and MIP images   were submitted for review.    No hilar and/or mediastinal adenopathy is noted.     Heart is normal in size. No pericardial effusion is noted. Pulmonary   arteries are normal in caliber. Main, right and left main, lobar and   segmental pulmonary arteries demonstrate no filling defects. Evaluation   of the subsegmental branches is limited due to breathing motion artifact.     No endobronchial lesions are noted. Centrilobular emphysema is noted   bilaterally. Compared to the previous examination, the patchy   opacities/consolidation within the right middle lobe and both lower lobes   have decreased. Residual patchy opacities are still noted throughout both   lungs. New patchy groundglass opacities are noted within both upper   lobes. No pleural effusions are noted.    Circumferential thickening is noted involving the esophagus. The   appearance is unchanged when compared to previous exam.    Below the diaphragm, visualized portions of the abdomen demonstrate   low-attenuation lesions within both kidneys which are unchanged when   compared to previous exam.     Degenerative changes of the spine are noted.    Impression: No pulmonary embolus is noted within the main, right and left   main, lobar and segmental pulmonary arteries bilaterally. Evaluation of   the subsegmental pulmonary arteries is limited due to breathing motion   artifact.    Patchy opacities/consolidations within both lower lobes and the right   middle lobe have decreased when compared to previous exam. Residual   patchy opacities are still noted.    Patchy groundglass opacities are noted within both upper lobes which were   not present on the previous examination.      < end of copied text >    Venous Dopplers: LE:  < from: [facility] Duplex Lower Ext Vein Scan, Bilat (03.28.19 @ 15:07) >    EXAM:  DUPLEX SCAN EXT VEINS LOWER BI                            PROCEDURE DATE:  03/28/2019            INTERPRETATION:  CLINICAL INFORMATION: Shortness of breath. Patient   states history of DVT. Limited mobility. Lower extremity swelling. Right   lower extremity pain.    COMPARISON: 1/30/2019.    TECHNIQUE: Duplex sonography of the BILATERAL LOWER extremities with   color and spectral Doppler, with and without compression.      FINDINGS:    There is normal compressibility of the bilateral common femoral, femoral   and popliteal veins. Bilateral calf veins cannot be visualized due to   swelling and patient's body habitus.    Doppler examination shows normal spontaneous and phasic flow in the   visualized venous segments.    IMPRESSION:     No evidence of bilateral proximal lower extremity deep venous thrombosis.   Bilateral calf veins not visualized and therefore not evaluated due to   swelling and patient's body habitus.      < end of copied text >    CT Abdomen < from: CT Abdomen and Pelvis No Cont (04.03.19 @ 16:51) >    EXAM:  CT ABDOMEN AND PELVIS                            PROCEDURE DATE:  04/03/2019            INTERPRETATION:  CLINICAL INFORMATION: Evaluate for hernia. Recent small   bowel obstruction requiring exploratory laparotomy 12/16/2018.  Abdominal   pain.    COMPARISON: 1/12/2019    PROCEDURE:   CT of the Abdomen and Pelvis was performed without intravenous contrast.   Intravenous contrast: None.  Oral contrast: None.  Sagittal and coronal reformats were performed.    FINDINGS:    LOWER CHEST: Scarring and atelectasis at the lung bases.    LIVER: Within normal limits.  BILE DUCTS: Normal caliber.  GALLBLADDER: Within normal limits.  SPLEEN: Within normal limits.  PANCREAS: Within normal limits.  ADRENALS: Within normal limits.  KIDNEYS/URETERS:Punctate nonobstructing right lower pole renal stone.   Bilateral renal lesions are incomplete characterize, likely cysts.    BLADDER: Within normal limits.  REPRODUCTIVE ORGANS: Myomatous uterus. Bilateral adnexa are unremarkable.    BOWEL AND ABDOMINAL WALL: No bowel obstruction. Appendix is normal.   Diverticulosis. There is 13 cm diastases of the rectus abdominis muscles   with herniation of the transverse colon, small bowel, and mesentery into   a large broad-based ventral hernia sac. Findings are new compared to   prior exam. There is no evidence of bowel obstruction.    PERITONEUM: No ascites.  VESSELS:  Within normal limits.  RETROPERITONEUM: No lymphadenopathy.    BONES: Degenerative changes of the spine and left sacroiliac joint.   Fragmentation of the right femoral head, similar to prior.    IMPRESSION:     *  Large diastases of the rectus abdominis muscle with herniation of   small and large bowel into a large ventral incisional hernia. Findings   are new compared to prior examination 1/12/2019.  *  No bowel obstruction.    < end of copied text >    Others  < from: Transthoracic Echocardiogram (03.26.19 @ 10:29) >    Patient name: AFTAB BASS  YOB: 1955   Age: 63 (F)   MR#: [id number]  Study Date: 3/26/2019  Location: [address] [place], [place] [id number]: Maria Elena Joseph RDCS  Study quality: Technically difficult  Referring Physician: Mirna Novoa MD  Blood Pressure: 136/64 mmHg  Height: 155 cm  Weight: 93 kg  BSA: 1.9 m2  ------------------------------------------------------------------------  PROCEDURE: Transthoracic echocardiogram with 2-D, M-Mode  and complete spectral and color flow Doppler.  INDICATION: Dyspnea, unspecified (R06.00)  ------------------------------------------------------------------------  Dimensions:    Normal Values:  LA:     2.4    2.0 - 4.0 cm  Ao:     2.9    2.0 - 3.8 cm  SEPTUM: 1.4    0.6 - 1.2 cm  PWT:    1.1    0.6 - 1.1 cm  LVIDd:  3.9    3.0 - 5.6 cm  LVIDs:  2.1    1.8 - 4.0 cm  Derived variables:  LVMI: 89 g/m2  RWT: 0.56  Fractional short: 46 %  EF (Teicholtz): 78 %  ------------------------------------------------------------------------  Observations:  Mitral Valve: Mitral annular calcification, otherwise  normal mitral valve.  Aortic Valve/Aorta: Normal trileaflet aortic valve.  Aortic Root: 2.9 cm.  LVOT diameter: 2.1 cm.  Left Atrium: Normal left atrium.  LA volume index = 20  cc/m2.  Left Ventricle:Hyperdynamic left ventricular systolic  function. Increased relative wall thickness with normal  left ventricular mass index, consistent with concentric  left ventricular remodeling.  Right Heart: Normal right atrium. The right ventricle is  not wellvisualized. Normal tricuspid valve. Mild tricuspid  regurgitation. Normal pulmonic valve.  Pericardium/Pleura: Normal pericardium with no pericardial  effusion.  Hemodynamic: Estimated right atrial pressure is 8 mm Hg.  Estimated right ventricular systolic pressure equals 42 mm  Hg, assuming right atrial pressure equals 8 mm Hg,  consistent with mild pulmonary hypertension.  ------------------------------------------------------------------------  Conclusions:  1. Increased relative wall thickness with normal left  ventricular mass index, consistent with concentric left  ventricular remodeling.  2. Hyperdynamic left ventricular systolic function.  3. Estimated pulmonary artery systolic pressure equals 42  mm Hg, assuming right atrial pressureequals 8 mm Hg,  consistent with mild pulmonary pressures.  --------------------------------------------------------------------    < end of copied text >

## 2019-04-07 LAB
GLUCOSE BLDC GLUCOMTR-MCNC: 126 MG/DL — HIGH (ref 70–99)
GLUCOSE BLDC GLUCOMTR-MCNC: 132 MG/DL — HIGH (ref 70–99)
GLUCOSE BLDC GLUCOMTR-MCNC: 157 MG/DL — HIGH (ref 70–99)
GLUCOSE BLDC GLUCOMTR-MCNC: 226 MG/DL — HIGH (ref 70–99)
HCT VFR BLD CALC: 32.9 % — LOW (ref 34.5–45)
HGB BLD-MCNC: 9.9 G/DL — LOW (ref 11.5–15.5)
MCHC RBC-ENTMCNC: 28 PG — SIGNIFICANT CHANGE UP (ref 27–34)
MCHC RBC-ENTMCNC: 30.1 GM/DL — LOW (ref 32–36)
MCV RBC AUTO: 92.9 FL — SIGNIFICANT CHANGE UP (ref 80–100)
PLATELET # BLD AUTO: 428 K/UL — HIGH (ref 150–400)
RBC # BLD: 3.54 M/UL — LOW (ref 3.8–5.2)
RBC # FLD: 16 % — HIGH (ref 10.3–14.5)
WBC # BLD: 13.16 K/UL — HIGH (ref 3.8–10.5)
WBC # FLD AUTO: 13.16 K/UL — HIGH (ref 3.8–10.5)

## 2019-04-07 PROCEDURE — 72148 MRI LUMBAR SPINE W/O DYE: CPT | Mod: 26

## 2019-04-07 RX ORDER — OXYCODONE HYDROCHLORIDE 5 MG/1
10 TABLET ORAL EVERY 4 HOURS
Qty: 0 | Refills: 0 | Status: DISCONTINUED | OUTPATIENT
Start: 2019-04-07 | End: 2019-04-09

## 2019-04-07 RX ADMIN — SIMVASTATIN 20 MILLIGRAM(S): 20 TABLET, FILM COATED ORAL at 21:25

## 2019-04-07 RX ADMIN — BUDESONIDE AND FORMOTEROL FUMARATE DIHYDRATE 2 PUFF(S): 160; 4.5 AEROSOL RESPIRATORY (INHALATION) at 18:10

## 2019-04-07 RX ADMIN — NYSTATIN CREAM 1 APPLICATION(S): 100000 CREAM TOPICAL at 18:11

## 2019-04-07 RX ADMIN — Medication 10 UNIT(S): at 13:20

## 2019-04-07 RX ADMIN — OXYCODONE HYDROCHLORIDE 10 MILLIGRAM(S): 5 TABLET ORAL at 10:32

## 2019-04-07 RX ADMIN — Medication 1: at 08:46

## 2019-04-07 RX ADMIN — Medication 3 MILLILITER(S): at 18:10

## 2019-04-07 RX ADMIN — PRIMIDONE 50 MILLIGRAM(S): 250 TABLET ORAL at 13:23

## 2019-04-07 RX ADMIN — OXYCODONE HYDROCHLORIDE 10 MILLIGRAM(S): 5 TABLET ORAL at 11:00

## 2019-04-07 RX ADMIN — Medication 40 MILLIGRAM(S): at 06:22

## 2019-04-07 RX ADMIN — BUDESONIDE AND FORMOTEROL FUMARATE DIHYDRATE 2 PUFF(S): 160; 4.5 AEROSOL RESPIRATORY (INHALATION) at 06:21

## 2019-04-07 RX ADMIN — Medication 1 GRAM(S): at 18:09

## 2019-04-07 RX ADMIN — OXYCODONE HYDROCHLORIDE 10 MILLIGRAM(S): 5 TABLET ORAL at 16:05

## 2019-04-07 RX ADMIN — Medication 0.5 MILLIGRAM(S): at 21:25

## 2019-04-07 RX ADMIN — OXYCODONE HYDROCHLORIDE 10 MILLIGRAM(S): 5 TABLET ORAL at 15:08

## 2019-04-07 RX ADMIN — OXYCODONE HYDROCHLORIDE 10 MILLIGRAM(S): 5 TABLET ORAL at 01:15

## 2019-04-07 RX ADMIN — INSULIN GLARGINE 20 UNIT(S): 100 INJECTION, SOLUTION SUBCUTANEOUS at 21:46

## 2019-04-07 RX ADMIN — Medication 2: at 13:20

## 2019-04-07 RX ADMIN — GABAPENTIN 400 MILLIGRAM(S): 400 CAPSULE ORAL at 06:25

## 2019-04-07 RX ADMIN — OXYCODONE HYDROCHLORIDE 10 MILLIGRAM(S): 5 TABLET ORAL at 20:46

## 2019-04-07 RX ADMIN — Medication 10 UNIT(S): at 18:09

## 2019-04-07 RX ADMIN — NYSTATIN CREAM 1 APPLICATION(S): 100000 CREAM TOPICAL at 06:25

## 2019-04-07 RX ADMIN — Medication 100 MILLIGRAM(S): at 06:22

## 2019-04-07 RX ADMIN — SENNA PLUS 2 TABLET(S): 8.6 TABLET ORAL at 21:25

## 2019-04-07 RX ADMIN — OXYCODONE HYDROCHLORIDE 10 MILLIGRAM(S): 5 TABLET ORAL at 19:46

## 2019-04-07 RX ADMIN — Medication 20 MILLIGRAM(S): at 21:25

## 2019-04-07 RX ADMIN — Medication 20 MILLIGRAM(S): at 06:22

## 2019-04-07 RX ADMIN — FENTANYL CITRATE 1 PATCH: 50 INJECTION INTRAVENOUS at 19:32

## 2019-04-07 RX ADMIN — TRAMADOL HYDROCHLORIDE 50 MILLIGRAM(S): 50 TABLET ORAL at 18:10

## 2019-04-07 RX ADMIN — Medication 200 MILLIGRAM(S): at 06:23

## 2019-04-07 RX ADMIN — Medication 100 MILLIGRAM(S): at 13:22

## 2019-04-07 RX ADMIN — Medication 3 MILLILITER(S): at 06:21

## 2019-04-07 RX ADMIN — TRAMADOL HYDROCHLORIDE 50 MILLIGRAM(S): 50 TABLET ORAL at 06:22

## 2019-04-07 RX ADMIN — PANTOPRAZOLE SODIUM 40 MILLIGRAM(S): 20 TABLET, DELAYED RELEASE ORAL at 06:22

## 2019-04-07 RX ADMIN — Medication 10 MILLIGRAM(S): at 06:22

## 2019-04-07 RX ADMIN — OXYCODONE HYDROCHLORIDE 10 MILLIGRAM(S): 5 TABLET ORAL at 00:27

## 2019-04-07 RX ADMIN — FENTANYL CITRATE 1 PATCH: 50 INJECTION INTRAVENOUS at 07:48

## 2019-04-07 RX ADMIN — Medication 137 MICROGRAM(S): at 06:22

## 2019-04-07 RX ADMIN — OXYCODONE HYDROCHLORIDE 10 MILLIGRAM(S): 5 TABLET ORAL at 06:23

## 2019-04-07 RX ADMIN — PANTOPRAZOLE SODIUM 40 MILLIGRAM(S): 20 TABLET, DELAYED RELEASE ORAL at 18:09

## 2019-04-07 RX ADMIN — GABAPENTIN 400 MILLIGRAM(S): 400 CAPSULE ORAL at 21:25

## 2019-04-07 RX ADMIN — Medication 20 MILLIGRAM(S): at 13:21

## 2019-04-07 RX ADMIN — Medication 1 GRAM(S): at 06:23

## 2019-04-07 RX ADMIN — Medication 100 MILLIGRAM(S): at 21:25

## 2019-04-07 RX ADMIN — Medication 10 UNIT(S): at 08:46

## 2019-04-07 RX ADMIN — Medication 3 MILLILITER(S): at 13:19

## 2019-04-07 RX ADMIN — POLYETHYLENE GLYCOL 3350 17 GRAM(S): 17 POWDER, FOR SOLUTION ORAL at 06:23

## 2019-04-07 RX ADMIN — Medication 40 MILLIGRAM(S): at 18:10

## 2019-04-07 RX ADMIN — Medication 1 GRAM(S): at 13:21

## 2019-04-07 RX ADMIN — GABAPENTIN 400 MILLIGRAM(S): 400 CAPSULE ORAL at 13:22

## 2019-04-07 NOTE — PROGRESS NOTE ADULT - ASSESSMENT
63yof w/ HTN, DM2, COPD/CHRIS on nocturnal CPAP, discharged from [facility] after a prolonged hospitalization for SBO requiring lysis of adhesions, complicated by hypercapneic respiratory failure requiring intubation. Pt awoke with shortness of breath and chest tightness, was supposed to use CPAP last night but nursing facility did not have the machine. Pt oxygen dependent on 4L at baseline. Feeling better now with supplemental oxygen. No fevers or chills. Occasional cough.  originally admitted on 12/15 for SBO s/p ex lap with lysis of adhesions (4 retention sutures) c/b 12/30 she eviscerated 2/2 a coughing episode s/p ex lap (6 retention sutures) c/b multifocal pneumonia, developed hypercapnic respiratory failure requiring intubation 1/6, s/p bronch 1/9/18, and extubated on 1/24, course further c/b Enterococci bacteremia now readmitted to MICU for hypoxic respiratory failure 2/2 atelectasis requiring intubation 01/31. Had grown ESBL in urine, s/p ertapenem.  Extubated to high flow/ BIPAP.

## 2019-04-07 NOTE — PROGRESS NOTE ADULT - SUBJECTIVE AND OBJECTIVE BOX
Patient is a 63y old  Female who presents with a chief complaint of sob (07 Apr 2019 09:27)      INTERVAL HPI/OVERNIGHT EVENTS:  T(C): 36.9 (04-07-19 @ 08:27), Max: 37 (04-06-19 @ 15:52)  HR: 102 (04-07-19 @ 11:35) (96 - 109)  BP: 105/51 (04-07-19 @ 08:27) (105/51 - 126/63)  RR: 18 (04-07-19 @ 08:27) (18 - 18)  SpO2: 99% (04-07-19 @ 11:35) (97% - 99%)  Wt(kg): --  I&O's Summary    06 Apr 2019 07:01  -  07 Apr 2019 07:00  --------------------------------------------------------  IN: 1390 mL / OUT: 500 mL / NET: 890 mL    07 Apr 2019 07:01  -  07 Apr 2019 14:16  --------------------------------------------------------  IN: 650 mL / OUT: 0 mL / NET: 650 mL        LABS:              CAPILLARY BLOOD GLUCOSE      POCT Blood Glucose.: 226 mg/dL (07 Apr 2019 12:21)  POCT Blood Glucose.: 157 mg/dL (07 Apr 2019 08:18)  POCT Blood Glucose.: 129 mg/dL (06 Apr 2019 21:32)  POCT Blood Glucose.: 112 mg/dL (06 Apr 2019 16:52)            MEDICATIONS  (STANDING):  ALBUTerol/ipratropium for Nebulization 3 milliLiter(s) Nebulizer every 6 hours  buDESOnide  80 MICROgram(s)/formoterol 4.5 MICROgram(s) Inhaler 2 Puff(s) Inhalation two times a day  dextrose 5%. 1000 milliLiter(s) (50 mL/Hr) IV Continuous <Continuous>  dextrose 50% Injectable 12.5 Gram(s) IV Push once  dextrose 50% Injectable 25 Gram(s) IV Push once  dextrose 50% Injectable 25 Gram(s) IV Push once  docusate sodium 100 milliGRAM(s) Oral three times a day  enoxaparin Injectable 40 milliGRAM(s) SubCutaneous every 24 hours  fentaNYL   Patch  25 MICROgram(s)/Hr 1 Patch Transdermal every 72 hours  furosemide    Tablet 40 milliGRAM(s) Oral two times a day  gabapentin 400 milliGRAM(s) Oral three times a day  guaiFENesin   Syrup  (Sugar-Free) 200 milliGRAM(s) Oral every 6 hours  influenza   Vaccine 0.5 milliLiter(s) IntraMuscular once  insulin glargine Injectable (LANTUS) 20 Unit(s) SubCutaneous at bedtime  insulin lispro (HumaLOG) corrective regimen sliding scale   SubCutaneous three times a day before meals  insulin lispro Injectable (HumaLOG) 10 Unit(s) SubCutaneous three times a day before meals  levothyroxine 137 MICROGram(s) Oral daily  nystatin Powder 1 Application(s) Topical two times a day  pantoprazole    Tablet 40 milliGRAM(s) Oral two times a day  polyethylene glycol 3350 17 Gram(s) Oral two times a day  predniSONE   Tablet 10 milliGRAM(s) Oral daily  primidone 50 milliGRAM(s) Oral daily  senna 2 Tablet(s) Oral at bedtime  sildenafil (REVATIO) 20 milliGRAM(s) Oral three times a day  simvastatin 20 milliGRAM(s) Oral at bedtime  sucralfate 1 Gram(s) Oral four times a day  traMADol 50 milliGRAM(s) Oral two times a day    MEDICATIONS  (PRN):  dextrose 40% Gel 15 Gram(s) Oral once PRN Blood Glucose LESS THAN 70 milliGRAM(s)/deciliter  glucagon  Injectable 1 milliGRAM(s) IntraMuscular once PRN Glucose LESS THAN 70 milligrams/deciliter  LORazepam     Tablet 0.5 milliGRAM(s) Oral once PRN Claustrophobia  ondansetron Injectable 4 milliGRAM(s) IV Push every 6 hours PRN Nausea  oxyCODONE    IR 10 milliGRAM(s) Oral every 4 hours PRN Moderate Pain (4 - 6)  sodium chloride 0.65% Nasal 1 Spray(s) Both Nostrils every 4 hours PRN Nasal Congestion          PHYSICAL EXAM:  GENERAL: frail  CHEST/LUNG: Clear to percussion bilaterally; No rales, rhonchi, wheezing, or rubs  HEART: Regular rate and rhythm; No murmurs, rubs, or gallops  ABDOMEN: Soft, Nontender, Nondistended; Bowel sounds present  EXTREMITIES:  Right foot drop    Care Discussed with Consultants/Other Providers [ ] YES  [ ] NO

## 2019-04-07 NOTE — PROGRESS NOTE ADULT - SUBJECTIVE AND OBJECTIVE BOX
INTERVAL HPI/OVERNIGHT EVENTS:  No new overnight event.  No N/V/D.  Tolerating diet.    Allergies    No Known Allergies    Intolerances    Seroquel (Other)        General:  No wt loss, fevers, chills, night sweats, fatigue,   Eyes:  Good vision, no reported pain  ENT:  No sore throat, pain, runny nose, dysphagia  CV:  No pain, palpitations, hypo/hypertension  Resp:  No dyspnea, cough, tachypnea, wheezing  GI:  No pain, No nausea, No vomiting, No diarrhea, No constipation, No weight loss, No fever, No pruritis, No rectal bleeding, No tarry stools, No dysphagia,  :  No pain, bleeding, incontinence, nocturia  Muscle:  No pain, weakness  Neuro:  No weakness, tingling, memory problems  Psych:  No fatigue, insomnia, mood problems, depression  Endocrine:  No polyuria, polydipsia, cold/heat intolerance  Heme:  No petechiae, ecchymosis, easy bruisability  Skin:  No rash, tattoos, scars, edema      PHYSICAL EXAM:   Vital Signs:  Vital Signs Last 24 Hrs  T(C): 36.9 (07 Apr 2019 08:27), Max: 36.9 (06 Apr 2019 23:27)  T(F): 98.4 (07 Apr 2019 08:27), Max: 98.5 (06 Apr 2019 23:27)  HR: 102 (07 Apr 2019 11:35) (96 - 104)  BP: 105/51 (07 Apr 2019 08:27) (105/51 - 116/62)  BP(mean): --  RR: 18 (07 Apr 2019 08:27) (18 - 18)  SpO2: 99% (07 Apr 2019 11:35) (97% - 99%)  Daily     Daily I&O's Summary    06 Apr 2019 07:01  -  07 Apr 2019 07:00  --------------------------------------------------------  IN: 1390 mL / OUT: 500 mL / NET: 890 mL    07 Apr 2019 07:01  -  07 Apr 2019 15:56  --------------------------------------------------------  IN: 1050 mL / OUT: 0 mL / NET: 1050 mL        GENERAL:  Appears stated age, well-groomed, well-nourished, no distress  HEENT:  NC/AT,  conjunctivae clear and pink, no thyromegaly, nodules, adenopathy, no JVD, sclera -anicteric  CHEST:  Full & symmetric excursion, no increased effort, breath sounds clear  HEART:  Regular rhythm, S1, S2, no murmur/rub/S3/S4, no abdominal bruit, no edema  ABDOMEN:  Soft, non-tender, non-distended, normoactive bowel sounds,  no masses ,no hepato-splenomegaly, no signs of chronic liver disease  EXTEREMITIES:  no cyanosis,clubbing or edema  SKIN:  No rash/erythema/ecchymoses/petechiae/wounds/abscess/warm/dry  NEURO:  Alert, oriented, no asterixis, no tremor, no encephalopathy      LABS:              amylase   lipase  RADIOLOGY & ADDITIONAL TESTS:

## 2019-04-07 NOTE — PROGRESS NOTE ADULT - ASSESSMENT
63 year old female with HTN, DM2, COPD/CHRIS on nocturnal CPAP, discharged 3/20 from [facility] after a prolonged hospitalization for SBO requiring lysis of adhesions, complicated by hypercapnic respiratory failure requiring intubation. GI consulted for anemia.

## 2019-04-07 NOTE — PROGRESS NOTE ADULT - SUBJECTIVE AND OBJECTIVE BOX
Patient is a 63y old  Female who presents with a chief complaint of sob (06 Apr 2019 19:06)    Any change in ROS: doing ok. used bipap all night and tolerating current setting. denies sob, cough, sputum, chest pain.    MEDICATIONS  (STANDING):  ALBUTerol/ipratropium for Nebulization 3 milliLiter(s) Nebulizer every 6 hours  buDESOnide  80 MICROgram(s)/formoterol 4.5 MICROgram(s) Inhaler 2 Puff(s) Inhalation two times a day  dextrose 5%. 1000 milliLiter(s) (50 mL/Hr) IV Continuous <Continuous>  dextrose 50% Injectable 12.5 Gram(s) IV Push once  dextrose 50% Injectable 25 Gram(s) IV Push once  dextrose 50% Injectable 25 Gram(s) IV Push once  docusate sodium 100 milliGRAM(s) Oral three times a day  enoxaparin Injectable 40 milliGRAM(s) SubCutaneous every 24 hours  fentaNYL   Patch  25 MICROgram(s)/Hr 1 Patch Transdermal every 72 hours  furosemide    Tablet 40 milliGRAM(s) Oral two times a day  gabapentin 400 milliGRAM(s) Oral three times a day  guaiFENesin   Syrup  (Sugar-Free) 200 milliGRAM(s) Oral every 6 hours  influenza   Vaccine 0.5 milliLiter(s) IntraMuscular once  insulin glargine Injectable (LANTUS) 20 Unit(s) SubCutaneous at bedtime  insulin lispro (HumaLOG) corrective regimen sliding scale   SubCutaneous three times a day before meals  insulin lispro Injectable (HumaLOG) 10 Unit(s) SubCutaneous three times a day before meals  levothyroxine 137 MICROGram(s) Oral daily  nystatin Powder 1 Application(s) Topical two times a day  pantoprazole    Tablet 40 milliGRAM(s) Oral two times a day  polyethylene glycol 3350 17 Gram(s) Oral two times a day  predniSONE   Tablet 10 milliGRAM(s) Oral daily  primidone 50 milliGRAM(s) Oral daily  senna 2 Tablet(s) Oral at bedtime  sildenafil (REVATIO) 20 milliGRAM(s) Oral three times a day  simvastatin 20 milliGRAM(s) Oral at bedtime  sucralfate 1 Gram(s) Oral four times a day  traMADol 50 milliGRAM(s) Oral two times a day    MEDICATIONS  (PRN):  dextrose 40% Gel 15 Gram(s) Oral once PRN Blood Glucose LESS THAN 70 milliGRAM(s)/deciliter  glucagon  Injectable 1 milliGRAM(s) IntraMuscular once PRN Glucose LESS THAN 70 milligrams/deciliter  LORazepam     Tablet 0.5 milliGRAM(s) Oral once PRN Claustrophobia  ondansetron Injectable 4 milliGRAM(s) IV Push every 6 hours PRN Nausea  oxyCODONE    IR 10 milliGRAM(s) Oral every 4 hours PRN Moderate Pain (4 - 6)  sodium chloride 0.65% Nasal 1 Spray(s) Both Nostrils every 4 hours PRN Nasal Congestion    Vital Signs Last 24 Hrs  T(C): 36.9 (07 Apr 2019 08:27), Max: 37 (06 Apr 2019 15:52)  T(F): 98.4 (07 Apr 2019 08:27), Max: 98.6 (06 Apr 2019 15:52)  HR: 102 (07 Apr 2019 08:27) (96 - 109)  BP: 105/51 (07 Apr 2019 08:27) (105/51 - 126/63)  BP(mean): --  RR: 18 (07 Apr 2019 08:27) (18 - 18)  SpO2: 97% (07 Apr 2019 08:27) (97% - 99%)    I&O's Summary    06 Apr 2019 07:01  -  07 Apr 2019 07:00  --------------------------------------------------------  IN: 1390 mL / OUT: 500 mL / NET: 890 mL          Physical Exam:   GENERAL: The patient comfortable with no apparent distress.   HEENT: Head is normocephalic and atraumatic.    NECK: Supple with no elevated JVP.   LUNGS: Clear to auscultation without wheeze and rhonchi.  HEART: S1 and S2 present without murmur.  ABDOMEN: Soft, nontender, and nondistended. obese   EXTREMITIES: No edema or calf tenderness.  NEUROLOGIC: Alert, awake, follows command. Right foot drop (+)    Labs:                              10.4   12.4  )-----------( 400      ( 05 Apr 2019 09:01 )             32.0     04-05    139  |  100  |  35<H>  ----------------------------<  147<H>  4.0   |  24  |  1.03    Ca    10.1      05 Apr 2019 08:55      CAPILLARY BLOOD GLUCOSE      POCT Blood Glucose.: 157 mg/dL (07 Apr 2019 08:18)  POCT Blood Glucose.: 129 mg/dL (06 Apr 2019 21:32)  POCT Blood Glucose.: 112 mg/dL (06 Apr 2019 16:52)  POCT Blood Glucose.: 200 mg/dL (06 Apr 2019 12:28)        Studies  Chest X-RAY  < from: Xray Chest 1 View- PORTABLE-Urgent (03.27.19 @ 11:47) >    EXAM:  XR CHEST PORTABLE URGENT 1V                            PROCEDURE DATE:  03/27/2019            INTERPRETATION:  XR CHEST PORTABLE URGENT 1V    INDICATION: Shortness of breath    COMPARISON: 3/21/2019    FINDINGS/  IMPRESSION:    Pulmonary edema is slightly increased. Small bilateral pleural effusions.   No pneumothorax.    The cardiac silhouette remains enlarged. Severe degenerative changes of   the right shoulder.      < end of copied text >    CT SCAN Chest   < from: CT Chest w/ IV Cont (01.02.19 @ 14:42) >    EXAM:  CT CHEST IC      EXAM:  CT ABDOMEN AND PELVIS IC        PROCEDURE DATE:  Jan 2 2019         INTERPRETATION:  CLINICAL INFORMATION: Recurrent fever. Status post   exploratory laparoscopy for SBO.      COMPARISON: CT chest, abdomen, and pelvis from 12/21/2018. CT abdomen and   pelvis from 12/25/2018. CT chest from 9/6/2017.    PROCEDURE:   CT of the Chest, Abdomen and Pelvis was performed with intravenous   contrast.   Intravenous contrast: 90 ml Omnipaque 350. 10 ml discarded.  Oral contrast: positive contrast was administered.  Sagittal and coronal reformats were performed.    FINDINGS:    CHEST:     LUNGS AND LARGE AIRWAYS: Patent central airways. Unchanged few blebs.   Bilateral patchy groundglass opacities and confluent consolidations, most   pronounced in the lingula and left lower lobe, likely infectious etiology.  PLEURA: Small bilateral pleural effusions.  VESSELS: Atherosclerotic changes of coronary artery.  HEART: Heart size is normal. No pericardial effusion.  MEDIASTINUM AND KELVIN: No lymphadenopathy.  CHEST WALL AND LOWER NECK: Within normal limits.    ABDOMEN AND PELVIS:    LIVER: Within normal limits.  BILE DUCTS: Normal caliber.  GALLBLADDER: Within normal limits.  SPLEEN: Within normal limits.  PANCREAS: Within normal limits.  ADRENALS: Within normal limits.  KIDNEYS/URETERS: A punctate nonobstructing right renal calculus. Cysts   and other lesions too small to characterize.    BLADDER: Underdistended, status post Antoine placement.  REPRODUCTIVE ORGANS: Fibroid uterus.    BOWEL: No bowel obstruction. Appendix is normal.  PERITONEUM: Trace ascites.  VESSELS:  Atherosclerotic changes.  RETROPERITONEUM: No lymphadenopathy.    ABDOMINAL WALL: Interval removal of skin staples. Postoperative changes   of anterior abdominal wall.  BONES: Evolving changes of right femoral head fracture. Severe   degenerative changes of right hip joint. Degenerative changes, most   pronounced at T7-8, unchanged since 9/6/2017.    IMPRESSION:     Multifocal pneumonia.    < end of copied text >    Venous Dopplers: LE:   < from: [facility] Duplex Lower Ext Vein Scan, Bilat (03.28.19 @ 15:07) >    EXAM:  DUPLEX SCAN EXT VEINS LOWER BI                            PROCEDURE DATE:  03/28/2019            INTERPRETATION:  CLINICAL INFORMATION: Shortness of breath. Patient   states history of DVT. Limited mobility. Lower extremity swelling. Right   lower extremity pain.    COMPARISON: 1/30/2019.    TECHNIQUE: Duplex sonography of the BILATERAL LOWER extremities with   color and spectral Doppler, with and without compression.      FINDINGS:    There is normal compressibility of the bilateral common femoral, femoral   and popliteal veins. Bilateral calf veins cannot be visualized due to   swelling and patient's body habitus.    Doppler examination shows normal spontaneous and phasic flow in the   visualized venous segments.    IMPRESSION:     No evidence of bilateral proximal lower extremity deep venous thrombosis.   Bilateral calf veins not visualized and therefore not evaluated due to   swelling and patient's body habitus.    < end of copied text >    CT Abdomen   < from: CT Abdomen and Pelvis No Cont (04.03.19 @ 16:51) >    EXAM:  CT ABDOMEN AND PELVIS                            PROCEDURE DATE:  04/03/2019            INTERPRETATION:  CLINICAL INFORMATION: Evaluate for hernia. Recent small   bowel obstruction requiring exploratory laparotomy 12/16/2018.  Abdominal   pain.    COMPARISON: 1/12/2019    PROCEDURE:   CT of the Abdomen and Pelvis was performed without intravenous contrast.   Intravenous contrast: None.  Oral contrast: None.  Sagittal and coronal reformats were performed.    FINDINGS:    LOWER CHEST: Scarring and atelectasis at the lung bases.    LIVER: Within normal limits.  BILE DUCTS: Normal caliber.  GALLBLADDER: Within normal limits.  SPLEEN: Within normal limits.  PANCREAS: Within normal limits.  ADRENALS: Within normal limits.  KIDNEYS/URETERS:Punctate nonobstructing right lower pole renal stone.   Bilateral renal lesions are incomplete characterize, likely cysts.    BLADDER: Within normal limits.  REPRODUCTIVE ORGANS: Myomatous uterus. Bilateral adnexa are unremarkable.    BOWEL AND ABDOMINAL WALL: No bowel obstruction. Appendix is normal.   Diverticulosis. There is 13 cm diastases of the rectus abdominis muscles   with herniation of the transverse colon, small bowel, and mesentery into   a large broad-based ventral hernia sac. Findings are new compared to   prior exam. There is no evidence of bowel obstruction.    PERITONEUM: No ascites.  VESSELS:  Within normal limits.  RETROPERITONEUM: No lymphadenopathy.    BONES: Degenerative changes of the spine and left sacroiliac joint.   Fragmentation of the right femoral head, similar to prior.    IMPRESSION:     *  Large diastases of the rectus abdominis muscle with herniation of   small and large bowel into a large ventral incisional hernia. Findings   are new compared to prior examination 1/12/2019.  *  No bowel obstruction.      < end of copied text >    Others  < from: Transthoracic Echocardiogram (03.26.19 @ 10:29) >    Patient name: AFTAB BASS  YOB: 1955   Age: 63 (F)   MR#: [id number]  Study Date: 3/26/2019  Location: [address] [place], [place] [id number]: Maria Elena Joseph RDCS  Study quality: Technically difficult  Referring Physician: Mirna Novoa MD  Blood Pressure: 136/64 mmHg  Height: 155 cm  Weight: 93 kg  BSA: 1.9 m2  ------------------------------------------------------------------------  PROCEDURE: Transthoracic echocardiogram with 2-D, M-Mode  and complete spectral and color flow Doppler.  INDICATION: Dyspnea, unspecified (R06.00)  ------------------------------------------------------------------------  Dimensions:    Normal Values:  LA:     2.4    2.0 - 4.0 cm  Ao:     2.9    2.0 - 3.8 cm  SEPTUM: 1.4    0.6 - 1.2 cm  PWT:    1.1    0.6 - 1.1 cm  LVIDd:  3.9    3.0 - 5.6 cm  LVIDs:  2.1    1.8 - 4.0 cm  Derived variables:  LVMI: 89 g/m2  RWT: 0.56  Fractional short: 46 %  EF (Teicholtz): 78 %  ------------------------------------------------------------------------  Observations:  Mitral Valve: Mitral annular calcification, otherwise  normal mitral valve.  Aortic Valve/Aorta: Normal trileaflet aortic valve.  Aortic Root: 2.9 cm.  LVOT diameter: 2.1 cm.  Left Atrium: Normal left atrium.  LA volume index = 20  cc/m2.  Left Ventricle:Hyperdynamic left ventricular systolic  function. Increased relative wall thickness with normal  left ventricular mass index, consistent with concentric  left ventricular remodeling.  Right Heart: Normal right atrium. The right ventricle is  not wellvisualized. Normal tricuspid valve. Mild tricuspid  regurgitation. Normal pulmonic valve.  Pericardium/Pleura: Normal pericardium with no pericardial  effusion.  Hemodynamic: Estimated right atrial pressure is 8 mm Hg.  Estimated right ventricular systolic pressure equals 42 mm  Hg, assuming right atrial pressure equals 8 mm Hg,  consistent with mild pulmonary hypertension.  ------------------------------------------------------------------------  Conclusions:  1. Increased relative wall thickness with normal left  ventricular mass index, consistent with concentric left  ventricular remodeling.  2. Hyperdynamic left ventricular systolic function.  3. Estimated pulmonary artery systolic pressure equals 42  mm Hg, assuming right atrial pressureequals 8 mm Hg,  consistent with mild pulmonary pressures.  ------------------------------------------------------------------------    < end of copied text >

## 2019-04-07 NOTE — PROGRESS NOTE ADULT - SUBJECTIVE AND OBJECTIVE BOX
pt seen and examined,  no  chest pain ,    ALBUTerol/ipratropium for Nebulization 3 milliLiter(s) Nebulizer every 6 hours  buDESOnide  80 MICROgram(s)/formoterol 4.5 MICROgram(s) Inhaler 2 Puff(s) Inhalation two times a day  dextrose 40% Gel 15 Gram(s) Oral once PRN  dextrose 5%. 1000 milliLiter(s) IV Continuous <Continuous>  dextrose 50% Injectable 12.5 Gram(s) IV Push once  dextrose 50% Injectable 25 Gram(s) IV Push once  dextrose 50% Injectable 25 Gram(s) IV Push once  docusate sodium 100 milliGRAM(s) Oral three times a day  enoxaparin Injectable 40 milliGRAM(s) SubCutaneous every 24 hours  fentaNYL   Patch  25 MICROgram(s)/Hr 1 Patch Transdermal every 72 hours  furosemide    Tablet 40 milliGRAM(s) Oral two times a day  gabapentin 400 milliGRAM(s) Oral three times a day  glucagon  Injectable 1 milliGRAM(s) IntraMuscular once PRN  guaiFENesin   Syrup  (Sugar-Free) 200 milliGRAM(s) Oral every 6 hours  influenza   Vaccine 0.5 milliLiter(s) IntraMuscular once  insulin glargine Injectable (LANTUS) 20 Unit(s) SubCutaneous at bedtime  insulin lispro (HumaLOG) corrective regimen sliding scale   SubCutaneous three times a day before meals  insulin lispro Injectable (HumaLOG) 10 Unit(s) SubCutaneous three times a day before meals  levothyroxine 137 MICROGram(s) Oral daily  LORazepam     Tablet 0.5 milliGRAM(s) Oral once PRN  nystatin Powder 1 Application(s) Topical two times a day  ondansetron Injectable 4 milliGRAM(s) IV Push every 6 hours PRN  oxyCODONE    IR 10 milliGRAM(s) Oral every 4 hours PRN  pantoprazole    Tablet 40 milliGRAM(s) Oral two times a day  polyethylene glycol 3350 17 Gram(s) Oral two times a day  predniSONE   Tablet 10 milliGRAM(s) Oral daily  primidone 50 milliGRAM(s) Oral daily  senna 2 Tablet(s) Oral at bedtime  sildenafil (REVATIO) 20 milliGRAM(s) Oral three times a day  simvastatin 20 milliGRAM(s) Oral at bedtime  sodium chloride 0.65% Nasal 1 Spray(s) Both Nostrils every 4 hours PRN  sucralfate 1 Gram(s) Oral four times a day  traMADol 50 milliGRAM(s) Oral two times a day          Hemoglobin: 10.4 g/dL (04-05 @ 09:01)            Creatinine Trend: 1.03<--, 0.74<--, 0.71<--, 0.76<--, 0.86<--, 0.97<--    COAGS:           T(C): 36.9 (04-07-19 @ 08:27), Max: 37 (04-06-19 @ 15:52)  HR: 102 (04-07-19 @ 08:27) (96 - 109)  BP: 105/51 (04-07-19 @ 08:27) (105/51 - 126/63)  RR: 18 (04-07-19 @ 08:27) (18 - 18)  SpO2: 97% (04-07-19 @ 08:27) (97% - 99%)  Wt(kg): --    I&O's Summary    06 Apr 2019 07:01  -  07 Apr 2019 07:00  --------------------------------------------------------  IN: 1390 mL / OUT: 500 mL / NET: 890 mL      Gen: Appears well in NAD  HEENT:  (-)icterus (-)pallor  CV: N S1 S2 1/6 OJ (+)2 Pulses B/l  Resp:  Clear to ausculatation B/L, normal effort  GI: (+) BS Soft, NT, ND  Lymph:  (-)Edema, (-)obvious lymphadenopathy  Skin: Warm to touch, Normal turgor  Psych: Appropriate mood and affect      ECG:  	sinus Tachycardia 109 BPM, nonspecific T wave abnormality    RADIOLOGY:         CXR:  < from: Xray Chest 1 View- PORTABLE-Urgent (03.21.19 @ 11:18) >  The heart is enlarged. Improvingopacities in both lungs when compared to   previous study done on March 6, 2019. Those changes are compatible with   interstitial fluid however an infectious process cannot be ruled out   entirely. Severe degenerative changes of the right shoulder. Degenerative   changes of the thoracic spine is evident as well.    < end of copied text >      ECHO: pending < from: Transthoracic Echocardiogram (03.26.19 @ 10:29) >  Conclusions:  1. Increased relative wall thickness with normal left  ventricular mass index, consistent with concentric left  ventricular remodeling.  2. Hyperdynamic left ventricular systolic function.  3. Estimated pulmonary artery systolic pressure equals 42  mm Hg, assuming right atrial pressureequals 8 mm Hg,  consistent with mild pulmonary pressures.    < end of copied text >      ASSESSMENT/PLAN: 	63y Female  HTN, DM2, COPD/CHRIS on nocturnal CPAP, normal LV function un echo 1/19 moderate pulmonary HTN discharged yesterday from [facility] after a prolonged hospitalization for SBO requiring lysis of adhesions, complicated by hypercapneic respiratory failure requiring intubation now being treated for HCAP..    -  cont All meds for pulm htn   -  keep net neg with  lasix , fluid status improving   - 3/26 ECHO ,   hyperdynamic LV fx   - no further CV work up needed at present  D/W Dr Joseph

## 2019-04-08 LAB
GLUCOSE BLDC GLUCOMTR-MCNC: 116 MG/DL — HIGH (ref 70–99)
GLUCOSE BLDC GLUCOMTR-MCNC: 152 MG/DL — HIGH (ref 70–99)
GLUCOSE BLDC GLUCOMTR-MCNC: 175 MG/DL — HIGH (ref 70–99)
GLUCOSE BLDC GLUCOMTR-MCNC: 203 MG/DL — HIGH (ref 70–99)

## 2019-04-08 PROCEDURE — ZZZZZ: CPT

## 2019-04-08 PROCEDURE — 99232 SBSQ HOSP IP/OBS MODERATE 35: CPT

## 2019-04-08 PROCEDURE — 99232 SBSQ HOSP IP/OBS MODERATE 35: CPT | Mod: GC

## 2019-04-08 RX ADMIN — PRIMIDONE 50 MILLIGRAM(S): 250 TABLET ORAL at 12:38

## 2019-04-08 RX ADMIN — ENOXAPARIN SODIUM 40 MILLIGRAM(S): 100 INJECTION SUBCUTANEOUS at 13:31

## 2019-04-08 RX ADMIN — Medication 2: at 12:39

## 2019-04-08 RX ADMIN — Medication 1: at 17:12

## 2019-04-08 RX ADMIN — FENTANYL CITRATE 1 PATCH: 50 INJECTION INTRAVENOUS at 00:01

## 2019-04-08 RX ADMIN — PANTOPRAZOLE SODIUM 40 MILLIGRAM(S): 20 TABLET, DELAYED RELEASE ORAL at 05:43

## 2019-04-08 RX ADMIN — Medication 200 MILLIGRAM(S): at 00:04

## 2019-04-08 RX ADMIN — Medication 500 MILLIGRAM(S): at 18:21

## 2019-04-08 RX ADMIN — Medication 200 MILLIGRAM(S): at 05:42

## 2019-04-08 RX ADMIN — FENTANYL CITRATE 1 PATCH: 50 INJECTION INTRAVENOUS at 19:42

## 2019-04-08 RX ADMIN — Medication 20 MILLIGRAM(S): at 22:04

## 2019-04-08 RX ADMIN — Medication 10 UNIT(S): at 12:38

## 2019-04-08 RX ADMIN — POLYETHYLENE GLYCOL 3350 17 GRAM(S): 17 POWDER, FOR SOLUTION ORAL at 05:43

## 2019-04-08 RX ADMIN — Medication 200 MILLIGRAM(S): at 17:10

## 2019-04-08 RX ADMIN — INSULIN GLARGINE 20 UNIT(S): 100 INJECTION, SOLUTION SUBCUTANEOUS at 22:04

## 2019-04-08 RX ADMIN — OXYCODONE HYDROCHLORIDE 10 MILLIGRAM(S): 5 TABLET ORAL at 17:10

## 2019-04-08 RX ADMIN — Medication 1 GRAM(S): at 05:43

## 2019-04-08 RX ADMIN — Medication 20 MILLIGRAM(S): at 13:35

## 2019-04-08 RX ADMIN — Medication 3 MILLILITER(S): at 00:04

## 2019-04-08 RX ADMIN — Medication 1 GRAM(S): at 12:39

## 2019-04-08 RX ADMIN — OXYCODONE HYDROCHLORIDE 10 MILLIGRAM(S): 5 TABLET ORAL at 10:52

## 2019-04-08 RX ADMIN — Medication 3 MILLILITER(S): at 05:42

## 2019-04-08 RX ADMIN — Medication 3 MILLILITER(S): at 12:38

## 2019-04-08 RX ADMIN — Medication 1: at 08:56

## 2019-04-08 RX ADMIN — FENTANYL CITRATE 1 PATCH: 50 INJECTION INTRAVENOUS at 08:42

## 2019-04-08 RX ADMIN — Medication 200 MILLIGRAM(S): at 12:38

## 2019-04-08 RX ADMIN — OXYCODONE HYDROCHLORIDE 10 MILLIGRAM(S): 5 TABLET ORAL at 21:59

## 2019-04-08 RX ADMIN — Medication 1 GRAM(S): at 17:10

## 2019-04-08 RX ADMIN — OXYCODONE HYDROCHLORIDE 10 MILLIGRAM(S): 5 TABLET ORAL at 18:29

## 2019-04-08 RX ADMIN — SENNA PLUS 2 TABLET(S): 8.6 TABLET ORAL at 22:03

## 2019-04-08 RX ADMIN — Medication 10 UNIT(S): at 17:11

## 2019-04-08 RX ADMIN — OXYCODONE HYDROCHLORIDE 10 MILLIGRAM(S): 5 TABLET ORAL at 00:05

## 2019-04-08 RX ADMIN — Medication 100 MILLIGRAM(S): at 05:42

## 2019-04-08 RX ADMIN — Medication 100 MILLIGRAM(S): at 22:03

## 2019-04-08 RX ADMIN — GABAPENTIN 400 MILLIGRAM(S): 400 CAPSULE ORAL at 05:43

## 2019-04-08 RX ADMIN — TRAMADOL HYDROCHLORIDE 50 MILLIGRAM(S): 50 TABLET ORAL at 05:42

## 2019-04-08 RX ADMIN — Medication 1 GRAM(S): at 23:36

## 2019-04-08 RX ADMIN — SIMVASTATIN 20 MILLIGRAM(S): 20 TABLET, FILM COATED ORAL at 22:04

## 2019-04-08 RX ADMIN — Medication 137 MICROGRAM(S): at 05:44

## 2019-04-08 RX ADMIN — OXYCODONE HYDROCHLORIDE 10 MILLIGRAM(S): 5 TABLET ORAL at 21:25

## 2019-04-08 RX ADMIN — Medication 100 MILLIGRAM(S): at 13:31

## 2019-04-08 RX ADMIN — OXYCODONE HYDROCHLORIDE 10 MILLIGRAM(S): 5 TABLET ORAL at 00:35

## 2019-04-08 RX ADMIN — GABAPENTIN 400 MILLIGRAM(S): 400 CAPSULE ORAL at 13:30

## 2019-04-08 RX ADMIN — Medication 40 MILLIGRAM(S): at 17:10

## 2019-04-08 RX ADMIN — BUDESONIDE AND FORMOTEROL FUMARATE DIHYDRATE 2 PUFF(S): 160; 4.5 AEROSOL RESPIRATORY (INHALATION) at 17:10

## 2019-04-08 RX ADMIN — NYSTATIN CREAM 1 APPLICATION(S): 100000 CREAM TOPICAL at 05:44

## 2019-04-08 RX ADMIN — Medication 1 GRAM(S): at 00:06

## 2019-04-08 RX ADMIN — FENTANYL CITRATE 1 PATCH: 50 INJECTION INTRAVENOUS at 00:05

## 2019-04-08 RX ADMIN — Medication 40 MILLIGRAM(S): at 05:44

## 2019-04-08 RX ADMIN — TRAMADOL HYDROCHLORIDE 50 MILLIGRAM(S): 50 TABLET ORAL at 17:10

## 2019-04-08 RX ADMIN — OXYCODONE HYDROCHLORIDE 10 MILLIGRAM(S): 5 TABLET ORAL at 12:34

## 2019-04-08 RX ADMIN — Medication 20 MILLIGRAM(S): at 05:42

## 2019-04-08 RX ADMIN — NYSTATIN CREAM 1 APPLICATION(S): 100000 CREAM TOPICAL at 17:11

## 2019-04-08 RX ADMIN — Medication 3 MILLILITER(S): at 17:10

## 2019-04-08 RX ADMIN — GABAPENTIN 400 MILLIGRAM(S): 400 CAPSULE ORAL at 22:04

## 2019-04-08 RX ADMIN — Medication 10 UNIT(S): at 08:43

## 2019-04-08 RX ADMIN — Medication 200 MILLIGRAM(S): at 23:36

## 2019-04-08 RX ADMIN — BUDESONIDE AND FORMOTEROL FUMARATE DIHYDRATE 2 PUFF(S): 160; 4.5 AEROSOL RESPIRATORY (INHALATION) at 05:44

## 2019-04-08 RX ADMIN — Medication 10 MILLIGRAM(S): at 05:43

## 2019-04-08 RX ADMIN — PANTOPRAZOLE SODIUM 40 MILLIGRAM(S): 20 TABLET, DELAYED RELEASE ORAL at 17:10

## 2019-04-08 NOTE — PROGRESS NOTE ADULT - SUBJECTIVE AND OBJECTIVE BOX
Interval Events: pt s/e. she denies abdominal pain, n/v. Tolerating PO well.  no new GI events/complaints  + brown bm yesterday    MEDICATIONS  (STANDING):  ALBUTerol/ipratropium for Nebulization 3 milliLiter(s) Nebulizer every 6 hours  buDESOnide  80 MICROgram(s)/formoterol 4.5 MICROgram(s) Inhaler 2 Puff(s) Inhalation two times a day  dextrose 5%. 1000 milliLiter(s) (50 mL/Hr) IV Continuous <Continuous>  dextrose 50% Injectable 12.5 Gram(s) IV Push once  dextrose 50% Injectable 25 Gram(s) IV Push once  dextrose 50% Injectable 25 Gram(s) IV Push once  docusate sodium 100 milliGRAM(s) Oral three times a day  enoxaparin Injectable 40 milliGRAM(s) SubCutaneous every 24 hours  fentaNYL   Patch  25 MICROgram(s)/Hr 1 Patch Transdermal every 72 hours  furosemide    Tablet 40 milliGRAM(s) Oral two times a day  gabapentin 400 milliGRAM(s) Oral three times a day  guaiFENesin   Syrup  (Sugar-Free) 200 milliGRAM(s) Oral every 6 hours  influenza   Vaccine 0.5 milliLiter(s) IntraMuscular once  insulin glargine Injectable (LANTUS) 20 Unit(s) SubCutaneous at bedtime  insulin lispro (HumaLOG) corrective regimen sliding scale   SubCutaneous three times a day before meals  insulin lispro Injectable (HumaLOG) 10 Unit(s) SubCutaneous three times a day before meals  levothyroxine 137 MICROGram(s) Oral daily  naproxen 500 milliGRAM(s) Oral two times a day  nystatin Powder 1 Application(s) Topical two times a day  pantoprazole    Tablet 40 milliGRAM(s) Oral two times a day  polyethylene glycol 3350 17 Gram(s) Oral two times a day  predniSONE   Tablet 10 milliGRAM(s) Oral daily  primidone 50 milliGRAM(s) Oral daily  senna 2 Tablet(s) Oral at bedtime  sildenafil (REVATIO) 20 milliGRAM(s) Oral three times a day  simvastatin 20 milliGRAM(s) Oral at bedtime  sucralfate 1 Gram(s) Oral four times a day  traMADol 50 milliGRAM(s) Oral two times a day    MEDICATIONS  (PRN):  dextrose 40% Gel 15 Gram(s) Oral once PRN Blood Glucose LESS THAN 70 milliGRAM(s)/deciliter  glucagon  Injectable 1 milliGRAM(s) IntraMuscular once PRN Glucose LESS THAN 70 milligrams/deciliter  ondansetron Injectable 4 milliGRAM(s) IV Push every 6 hours PRN Nausea  oxyCODONE    IR 10 milliGRAM(s) Oral every 4 hours PRN Moderate Pain (4 - 6)  sodium chloride 0.65% Nasal 1 Spray(s) Both Nostrils every 4 hours PRN Nasal Congestion      Allergies    No Known Allergies    Intolerances    Seroquel (Other)      Review of Systems:    General:  No wt loss, fevers, chills, night sweats,fatigue,   Eyes:  Good vision, no reported pain  ENT:  No sore throat, pain, runny nose, dysphagia  CV:  No pain, palpitations, hypo/hypertension  Resp:  No dyspnea, cough, tachypnea, wheezing  GI:  No pain, No nausea, No vomiting, No diarrhea, No constipation, No weight loss, No fever, No pruritis, No rectal bleeding, No melena, No dysphagia  :  No pain, bleeding, incontinence, nocturia  Muscle:  No pain, weakness  Neuro:  No weakness, tingling, memory problems  Psych:  No fatigue, insomnia, mood problems, depression  Endocrine:  No polyuria, polydypsia, cold/heat intolerance  Heme:  No petechiae, ecchymosis, easy bruisability  Skin:  No rash, tattoos, scars, edema      Vital Signs Last 24 Hrs  T(C): 36.9 (08 Apr 2019 07:21), Max: 37.3 (08 Apr 2019 00:11)  T(F): 98.4 (08 Apr 2019 07:21), Max: 99.2 (08 Apr 2019 00:11)  HR: 92 (08 Apr 2019 07:21) (89 - 102)  BP: 106/75 (08 Apr 2019 07:21) (106/75 - 138/84)  BP(mean): --  RR: 18 (08 Apr 2019 07:21) (17 - 18)  SpO2: 100% (08 Apr 2019 07:21) (99% - 100%)    PHYSICAL EXAM:    Constitutional: NAD, well-developed  HEENT: EOMI, throat clear  Neck: No LAD, supple  Respiratory: CTA and P  Cardiovascular: S1 and S2, RRR, no M  Gastrointestinal: soft, NTND, midline incision c/d/i, no rebound or guarding  Extremities: No peripheral edema, neg clubing, cyanosis  Vascular: 2+ peripheral pulses  Neurological: A/O x 3, no focal deficits  Psychiatric: Normal mood, normal affect  Skin: No rashes      LABS:                        9.9    13.16 )-----------( 428      ( 07 Apr 2019 16:30 )             32.9                 RADIOLOGY & ADDITIONAL TESTS:

## 2019-04-08 NOTE — PROGRESS NOTE ADULT - SUBJECTIVE AND OBJECTIVE BOX
p ([id number])     HPI:  63yof w/ HTN, DM2, COPD/CHRIS on nocturnal CPAP, discharged yesterday from [facility] after a prolonged hospitalization for SBO requiring lysis of adhesions, complicated by hypercapneic respiratory failure requiring intubation. This morning pt awoke with shortness of breath and chest tightness, was supposed to use CPAP last night but nursing facility did not have the machine. Pt oxygen dependent on 4L at baseline. Feeling better now with supplemental oxygen. No fevers or chills. Occasional cough. (21 Mar 2019 19:33)    Exam:    --Anticoagulation:  enoxaparin Injectable 40 milliGRAM(s) SubCutaneous every 24 hours    =====================  PAST MEDICAL HISTORY   Diverticulosis  RA (Rheumatoid Arthritis)  Tooth Abscess  Arthritis  Cigarette Smoker  Chronic Asthma  Adult Hypothyroidism  Borderline Diabetes Mellitus  High Cholesterol  H/O: HTN    PAST SURGICAL HISTORY   Wrist Disorder  History of  Section    Seroquel (Other)      MEDICATIONS:  Antibiotics:    Neuro:  fentaNYL   Patch  25 MICROgram(s)/Hr 1 Patch Transdermal every 72 hours  gabapentin 400 milliGRAM(s) Oral three times a day  naproxen 500 milliGRAM(s) Oral two times a day  ondansetron Injectable 4 milliGRAM(s) IV Push every 6 hours PRN  oxyCODONE    IR 10 milliGRAM(s) Oral every 4 hours PRN  primidone 50 milliGRAM(s) Oral daily  traMADol 50 milliGRAM(s) Oral two times a day    Other:  ALBUTerol/ipratropium for Nebulization 3 milliLiter(s) Nebulizer every 6 hours  buDESOnide  80 MICROgram(s)/formoterol 4.5 MICROgram(s) Inhaler 2 Puff(s) Inhalation two times a day  dextrose 40% Gel 15 Gram(s) Oral once PRN  dextrose 5%. 1000 milliLiter(s) IV Continuous <Continuous>  dextrose 50% Injectable 12.5 Gram(s) IV Push once  dextrose 50% Injectable 25 Gram(s) IV Push once  dextrose 50% Injectable 25 Gram(s) IV Push once  docusate sodium 100 milliGRAM(s) Oral three times a day  furosemide    Tablet 40 milliGRAM(s) Oral two times a day  glucagon  Injectable 1 milliGRAM(s) IntraMuscular once PRN  guaiFENesin   Syrup  (Sugar-Free) 200 milliGRAM(s) Oral every 6 hours  influenza   Vaccine 0.5 milliLiter(s) IntraMuscular once  insulin glargine Injectable (LANTUS) 20 Unit(s) SubCutaneous at bedtime  insulin lispro (HumaLOG) corrective regimen sliding scale   SubCutaneous three times a day before meals  insulin lispro Injectable (HumaLOG) 10 Unit(s) SubCutaneous three times a day before meals  levothyroxine 137 MICROGram(s) Oral daily  pantoprazole    Tablet 40 milliGRAM(s) Oral two times a day  polyethylene glycol 3350 17 Gram(s) Oral two times a day  predniSONE   Tablet 10 milliGRAM(s) Oral daily  senna 2 Tablet(s) Oral at bedtime  sildenafil (REVATIO) 20 milliGRAM(s) Oral three times a day  simvastatin 20 milliGRAM(s) Oral at bedtime  sucralfate 1 Gram(s) Oral four times a day      SOCIAL HISTORY:   Occupation:   Marital Status:     FAMILY HISTORY:  No pertinent family history in first degree relatives      ROS: Negative except per HPI    LABS:                          9.9    13.16 )-----------( 428      ( 2019 16:30 )             32.9 p ([id number])     HPI:  63 yr old F h/o HTN, DM2, COPD/CHRIS on nocturnal CPAP, discharged from [facility] after a prolonged hospitalization for SBO requiring lysis of adhesions, complicated by hypercapneic respiratory coma requiring intubation. Patient was transferred to [facility] in late February and since then she noticed weakness in her right foot as well as decreased sensations in the dorsum of her right foot. She denies back pain, saddle anesthesia, and urinary and bowel incontinence.       Exam:     Constitutional: No Acute Distress     Neurological: AOx3, Following Commands  Tremor     Motor exam:          Upper extremity                         Delt     Bicep     Tricep    HG                                                 R         4/5        5/5        5/5       5/5                                               L          3/5        5/5        5/5       5/5          Lower extremity                        HF         KF        KE       DF         PF                                                  R        3/5        4/5        4/5       2/5       4/5                                               L         5/5        5/5       5/5       5/5        5/5                                                 Sensation: [] intact to light touch  [X] decreased: R dorsum of the foot    No clonus or hoffmans      --Anticoagulation:  enoxaparin Injectable 40 milliGRAM(s) SubCutaneous every 24 hours    =====================  PAST MEDICAL HISTORY   Diverticulosis  RA (Rheumatoid Arthritis)  Tooth Abscess  Arthritis  Cigarette Smoker  Chronic Asthma  Adult Hypothyroidism  Borderline Diabetes Mellitus  High Cholesterol  H/O: HTN    PAST SURGICAL HISTORY   Wrist Disorder  History of  Section    Seroquel (Other)      MEDICATIONS:  Antibiotics:    Neuro:  fentaNYL   Patch  25 MICROgram(s)/Hr 1 Patch Transdermal every 72 hours  gabapentin 400 milliGRAM(s) Oral three times a day  naproxen 500 milliGRAM(s) Oral two times a day  ondansetron Injectable 4 milliGRAM(s) IV Push every 6 hours PRN  oxyCODONE    IR 10 milliGRAM(s) Oral every 4 hours PRN  primidone 50 milliGRAM(s) Oral daily  traMADol 50 milliGRAM(s) Oral two times a day    Other:  ALBUTerol/ipratropium for Nebulization 3 milliLiter(s) Nebulizer every 6 hours  buDESOnide  80 MICROgram(s)/formoterol 4.5 MICROgram(s) Inhaler 2 Puff(s) Inhalation two times a day  dextrose 40% Gel 15 Gram(s) Oral once PRN  dextrose 5%. 1000 milliLiter(s) IV Continuous <Continuous>  dextrose 50% Injectable 12.5 Gram(s) IV Push once  dextrose 50% Injectable 25 Gram(s) IV Push once  dextrose 50% Injectable 25 Gram(s) IV Push once  docusate sodium 100 milliGRAM(s) Oral three times a day  furosemide    Tablet 40 milliGRAM(s) Oral two times a day  glucagon  Injectable 1 milliGRAM(s) IntraMuscular once PRN  guaiFENesin   Syrup  (Sugar-Free) 200 milliGRAM(s) Oral every 6 hours  influenza   Vaccine 0.5 milliLiter(s) IntraMuscular once  insulin glargine Injectable (LANTUS) 20 Unit(s) SubCutaneous at bedtime  insulin lispro (HumaLOG) corrective regimen sliding scale   SubCutaneous three times a day before meals  insulin lispro Injectable (HumaLOG) 10 Unit(s) SubCutaneous three times a day before meals  levothyroxine 137 MICROGram(s) Oral daily  pantoprazole    Tablet 40 milliGRAM(s) Oral two times a day  polyethylene glycol 3350 17 Gram(s) Oral two times a day  predniSONE   Tablet 10 milliGRAM(s) Oral daily  senna 2 Tablet(s) Oral at bedtime  sildenafil (REVATIO) 20 milliGRAM(s) Oral three times a day  simvastatin 20 milliGRAM(s) Oral at bedtime  sucralfate 1 Gram(s) Oral four times a day      SOCIAL HISTORY:   Occupation:   Marital Status:     FAMILY HISTORY:  No pertinent family history in first degree relatives      ROS: Negative except per HPI    LABS:                          9.9    13.16 )-----------( 428      ( 2019 16:30 )             32.9

## 2019-04-08 NOTE — PROGRESS NOTE ADULT - SUBJECTIVE AND OBJECTIVE BOX
Patient is a 63y old  Female who presents with a chief complaint of sob (08 Apr 2019 09:38)      Any change in ROS: pt is doing ok : no SOB : no cough :      MEDICATIONS  (STANDING):  ALBUTerol/ipratropium for Nebulization 3 milliLiter(s) Nebulizer every 6 hours  buDESOnide  80 MICROgram(s)/formoterol 4.5 MICROgram(s) Inhaler 2 Puff(s) Inhalation two times a day  dextrose 5%. 1000 milliLiter(s) (50 mL/Hr) IV Continuous <Continuous>  dextrose 50% Injectable 12.5 Gram(s) IV Push once  dextrose 50% Injectable 25 Gram(s) IV Push once  dextrose 50% Injectable 25 Gram(s) IV Push once  docusate sodium 100 milliGRAM(s) Oral three times a day  enoxaparin Injectable 40 milliGRAM(s) SubCutaneous every 24 hours  fentaNYL   Patch  25 MICROgram(s)/Hr 1 Patch Transdermal every 72 hours  furosemide    Tablet 40 milliGRAM(s) Oral two times a day  gabapentin 400 milliGRAM(s) Oral three times a day  guaiFENesin   Syrup  (Sugar-Free) 200 milliGRAM(s) Oral every 6 hours  influenza   Vaccine 0.5 milliLiter(s) IntraMuscular once  insulin glargine Injectable (LANTUS) 20 Unit(s) SubCutaneous at bedtime  insulin lispro (HumaLOG) corrective regimen sliding scale   SubCutaneous three times a day before meals  insulin lispro Injectable (HumaLOG) 10 Unit(s) SubCutaneous three times a day before meals  levothyroxine 137 MICROGram(s) Oral daily  naproxen 500 milliGRAM(s) Oral two times a day  nystatin Powder 1 Application(s) Topical two times a day  pantoprazole    Tablet 40 milliGRAM(s) Oral two times a day  polyethylene glycol 3350 17 Gram(s) Oral two times a day  predniSONE   Tablet 10 milliGRAM(s) Oral daily  primidone 50 milliGRAM(s) Oral daily  senna 2 Tablet(s) Oral at bedtime  sildenafil (REVATIO) 20 milliGRAM(s) Oral three times a day  simvastatin 20 milliGRAM(s) Oral at bedtime  sucralfate 1 Gram(s) Oral four times a day  traMADol 50 milliGRAM(s) Oral two times a day    MEDICATIONS  (PRN):  dextrose 40% Gel 15 Gram(s) Oral once PRN Blood Glucose LESS THAN 70 milliGRAM(s)/deciliter  glucagon  Injectable 1 milliGRAM(s) IntraMuscular once PRN Glucose LESS THAN 70 milligrams/deciliter  ondansetron Injectable 4 milliGRAM(s) IV Push every 6 hours PRN Nausea  oxyCODONE    IR 10 milliGRAM(s) Oral every 4 hours PRN Moderate Pain (4 - 6)  sodium chloride 0.65% Nasal 1 Spray(s) Both Nostrils every 4 hours PRN Nasal Congestion    Vital Signs Last 24 Hrs  T(C): 36.9 (08 Apr 2019 07:21), Max: 37.3 (08 Apr 2019 00:11)  T(F): 98.4 (08 Apr 2019 07:21), Max: 99.2 (08 Apr 2019 00:11)  HR: 92 (08 Apr 2019 07:21) (89 - 102)  BP: 106/75 (08 Apr 2019 07:21) (106/75 - 138/84)  BP(mean): --  RR: 18 (08 Apr 2019 07:21) (17 - 18)  SpO2: 100% (08 Apr 2019 07:21) (99% - 100%)    I&O's Summary    07 Apr 2019 07:01  -  08 Apr 2019 07:00  --------------------------------------------------------  IN: 1370 mL / OUT: 400 mL / NET: 970 mL          Physical Exam:   GENERAL: Obese  HEENT: KWAME/   Atraumatic, Normocephalic  ENMT: No tonsillar erythema, exudates, or enlargement; Moist mucous membranes, Good dentition, No lesions  NECK: Supple, No JVD, Normal thyroid  CHEST/LUNG: Clear to auscultaion  CVS: Regular rate and rhythm; No murmurs, rubs, or gallops  GI: : Soft, Nontender, Nondistended; Bowel sounds present  NERVOUS SYSTEM:  Alert & Oriented X3  EXTREMITIES: - edema  LYMPH: No lymphadenopathy noted  SKIN: No rashes or lesions  ENDOCRINOLOGY: No Thyromegaly  PSYCH: Appropriate    Labs:                              9.9    13.16 )-----------( 428      ( 07 Apr 2019 16:30 )             32.9                         10.4   12.4  )-----------( 400      ( 05 Apr 2019 09:01 )             32.0     04-05    139  |  100  |  35<H>  ----------------------------<  147<H>  4.0   |  24  |  1.03        CAPILLARY BLOOD GLUCOSE      POCT Blood Glucose.: 152 mg/dL (08 Apr 2019 08:40)  POCT Blood Glucose.: 126 mg/dL (07 Apr 2019 21:28)  POCT Blood Glucose.: 132 mg/dL (07 Apr 2019 17:15)  POCT Blood Glucose.: 226 mg/dL (07 Apr 2019 12:21)              < from: Xray Chest 1 View- PORTABLE-Urgent (02.24.19 @ 17:59) >    EXAM:  XR CHEST PORTABLE URGENT 1V        PROCEDURE DATE:  Feb 24 2019         INTERPRETATION:  TIME OF EXAM: February 24, 2019 at 4:37 PM    CLINICAL INFORMATION: Line placement    TECHNIQUE:   Portable chest    INTERPRETATION:     Right IJ line is seen with its tip at the SVC/right atrial junction.    The endotracheal and enteric tube in addition to the left subclavian line   are unchanged.    Lungs show interstitial edema without cardiomegaly.    No pneumothorax.      COMPARISON:  February 11      IMPRESSION:  Status post right IJ central line placement.                  LAUREN MAGALLON M.D., ATTENDING RADIOLOGIST  This document has been electronically signed. Feb 25 2019 10:46AM    < end of copied text >      RECENT CULTURES:        RESPIRATORY CULTURES:          Studies  Chest X-RAY  CT SCAN Chest   Venous Dopplers: LE:   CT Abdomen  Others

## 2019-04-08 NOTE — PROGRESS NOTE ADULT - SUBJECTIVE AND OBJECTIVE BOX
Patient is a 63y old  Female who presents with a chief complaint of sob (08 Apr 2019 17:27)      INTERVAL HPI/OVERNIGHT EVENTS:  T(C): 36.3 (04-08-19 @ 16:34), Max: 37.3 (04-08-19 @ 00:11)  HR: 92 (04-08-19 @ 18:03) (89 - 99)  BP: 112/72 (04-08-19 @ 16:34) (106/75 - 138/84)  RR: 19 (04-08-19 @ 18:03) (17 - 20)  SpO2: 98% (04-08-19 @ 18:03) (98% - 100%)  Wt(kg): --  I&O's Summary    07 Apr 2019 07:01  -  08 Apr 2019 07:00  --------------------------------------------------------  IN: 1370 mL / OUT: 400 mL / NET: 970 mL    08 Apr 2019 07:01  -  08 Apr 2019 18:36  --------------------------------------------------------  IN: 540 mL / OUT: 0 mL / NET: 540 mL        LABS:                        9.9    13.16 )-----------( 428      ( 07 Apr 2019 16:30 )             32.9               CAPILLARY BLOOD GLUCOSE      POCT Blood Glucose.: 175 mg/dL (08 Apr 2019 16:54)  POCT Blood Glucose.: 203 mg/dL (08 Apr 2019 12:18)  POCT Blood Glucose.: 152 mg/dL (08 Apr 2019 08:40)  POCT Blood Glucose.: 126 mg/dL (07 Apr 2019 21:28)            MEDICATIONS  (STANDING):  ALBUTerol/ipratropium for Nebulization 3 milliLiter(s) Nebulizer every 6 hours  buDESOnide  80 MICROgram(s)/formoterol 4.5 MICROgram(s) Inhaler 2 Puff(s) Inhalation two times a day  dextrose 5%. 1000 milliLiter(s) (50 mL/Hr) IV Continuous <Continuous>  dextrose 50% Injectable 12.5 Gram(s) IV Push once  dextrose 50% Injectable 25 Gram(s) IV Push once  dextrose 50% Injectable 25 Gram(s) IV Push once  docusate sodium 100 milliGRAM(s) Oral three times a day  enoxaparin Injectable 40 milliGRAM(s) SubCutaneous every 24 hours  fentaNYL   Patch  25 MICROgram(s)/Hr 1 Patch Transdermal every 72 hours  furosemide    Tablet 40 milliGRAM(s) Oral two times a day  gabapentin 400 milliGRAM(s) Oral three times a day  guaiFENesin   Syrup  (Sugar-Free) 200 milliGRAM(s) Oral every 6 hours  influenza   Vaccine 0.5 milliLiter(s) IntraMuscular once  insulin glargine Injectable (LANTUS) 20 Unit(s) SubCutaneous at bedtime  insulin lispro (HumaLOG) corrective regimen sliding scale   SubCutaneous three times a day before meals  insulin lispro Injectable (HumaLOG) 10 Unit(s) SubCutaneous three times a day before meals  levothyroxine 137 MICROGram(s) Oral daily  naproxen 500 milliGRAM(s) Oral two times a day  nystatin Powder 1 Application(s) Topical two times a day  pantoprazole    Tablet 40 milliGRAM(s) Oral two times a day  polyethylene glycol 3350 17 Gram(s) Oral two times a day  predniSONE   Tablet 10 milliGRAM(s) Oral daily  primidone 50 milliGRAM(s) Oral daily  senna 2 Tablet(s) Oral at bedtime  sildenafil (REVATIO) 20 milliGRAM(s) Oral three times a day  simvastatin 20 milliGRAM(s) Oral at bedtime  sucralfate 1 Gram(s) Oral four times a day  traMADol 50 milliGRAM(s) Oral two times a day    MEDICATIONS  (PRN):  dextrose 40% Gel 15 Gram(s) Oral once PRN Blood Glucose LESS THAN 70 milliGRAM(s)/deciliter  glucagon  Injectable 1 milliGRAM(s) IntraMuscular once PRN Glucose LESS THAN 70 milligrams/deciliter  ondansetron Injectable 4 milliGRAM(s) IV Push every 6 hours PRN Nausea  oxyCODONE    IR 10 milliGRAM(s) Oral every 4 hours PRN Moderate Pain (4 - 6)  sodium chloride 0.65% Nasal 1 Spray(s) Both Nostrils every 4 hours PRN Nasal Congestion          PHYSICAL EXAM:  GENERAL: NAD, well-groomed, well-developed  HEAD:  Atraumatic, Normocephalic  CHEST/LUNG: Clear to percussion bilaterally; No rales, rhonchi, wheezing, or rubs  HEART: Regular rate and rhythm; No murmurs, rubs, or gallops  ABDOMEN: Soft, Nontender, Nondistended; Bowel sounds present  EXTREMITIES:  2+ Peripheral Pulses, No clubbing, cyanosis, or edema  LYMPH: No lymphadenopathy noted  SKIN: No rashes or lesions    Care Discussed with Consultants/Other Providers [ ] YES  [ ] NO

## 2019-04-08 NOTE — PROGRESS NOTE ADULT - ASSESSMENT
63F PMHX of HTN, DM2, COPD/CHRIS on nocturnal CPAP, RA, discharged on 3/20 from [facility] after a prolonged hospitalization for SBO requiring lysis of adhesions, complicated by hypercapnic respiratory failure requiring intubation with chronic R foot weakness (2-3/5 PF/DF) consulted by neurology for continued weakness.  - MRI done stable since 2015  - No acute neurosurgical intervention  - May f/u outpatient with Dr. Colvin 63 yr old F h/o HTN, DM2, COPD/CHRIS on nocturnal CPAP, discharged from [facility] after a prolonged hospitalization for SBO requiring lysis of adhesions, complicated by hypercapneic respiratory coma requiring intubation. Patient was transferred to [facility] in late February and since then she noticed weakness in her right foot as well as decreased sensations in the dorsum of her right foot. MRI done stable since 2015.    Plan:  - No acute neurosurgical intervention  - Neurology for EMG and NCS  - May f/u outpatient with Dr. Colvin in 1-2 weeks after discharge

## 2019-04-08 NOTE — PROGRESS NOTE ADULT - ASSESSMENT
63 year old female with HTN, DM2, COPD/CHRIS on nocturnal CPAP, discharged 3/20 from [facility] after a prolonged hospitalization for SBO requiring lysis of adhesions, complicated by hypercapnic respiratory failure requiring intubation. GI consulted for anemia.

## 2019-04-08 NOTE — PROGRESS NOTE ADULT - SUBJECTIVE AND OBJECTIVE BOX
SUBJECTIVE: Pt seen, chart reviewed.  No c/o N/V/D  No F/c/S  (+) regular BM  Tolerating diet  no abdominal pain  Minimally OOB to chair, non ambulatory- looking forward to [facility] upon d/c  Tolerating ADAPTIC abdominal dressings- c/o that newly healed skin on periphery of wound is sensitive & frail  Superficial Wound and pain better with larger dressing so tape isn't placed on new skin  SOB/ Flutters improved= followed by Cardiology      ROS skin/ GI:  see HPI  All other systems negative    enoxaparin Injectable 40 milliGRAM(s) SubCutaneous every 24 hours      Physical Exam:  Vital Signs Last 24 Hrs  T(C): 36.9 (08 Apr 2019 07:21), Max: 37.3 (08 Apr 2019 00:11)  T(F): 98.4 (08 Apr 2019 07:21), Max: 99.2 (08 Apr 2019 00:11)  HR: 91 (08 Apr 2019 10:53) (89 - 99)  BP: 106/75 (08 Apr 2019 07:21) (106/75 - 138/84)  BP(mean): --  RR: 20 (08 Apr 2019 10:53) (17 - 20)  SpO2: 100% (08 Apr 2019 10:53) (99% - 100%)  General Appearance:  NAD, A&Ox3, MO  Versa Care P500    Gastrointestinal soft NT/ND (+)BS    large abdominal hernia noted- no signs of strangulation or incarceration    Skin:  moist w/ good turgor    Moist Superficial Abdominal wall wound- skin dehiscence   9cm x 9cm x 0.1cm  healthy pink tissue  hyper & hypopigmented newly epithelialized skin on periphery  scant serosanguinous drainage  No odor, erythema, increased warmth, tenderness, induration, fluctuance      LABS:                        9.9    13.16 )-----------( 428      ( 07 Apr 2019 16:30 )             32.9         RADIOLOGY:  < from: CT Abdomen and Pelvis No Cont (04.03.19 @ 16:51) >  FINDINGS:    LOWER CHEST: Scarring and atelectasis at the lung bases.    LIVER: Within normal limits.  BILE DUCTS: Normal caliber.  GALLBLADDER: Within normal limits.  SPLEEN: Within normal limits.  PANCREAS: Within normal limits.  ADRENALS: Within normal limits.  KIDNEYS/URETERS:Punctate nonobstructing right lower pole renal stone.   Bilateral renal lesions are incomplete characterize, likely cysts.    BLADDER: Within normal limits.  REPRODUCTIVE ORGANS: Myomatous uterus. Bilateral adnexa are unremarkable.    BOWEL AND ABDOMINAL WALL: No bowel obstruction. Appendix is normal.   Diverticulosis. There is 13 cm diastases of the rectus abdominis muscles   with herniation of the transverse colon, small bowel, and mesentery into   a large broad-based ventral hernia sac. Findings are new compared to   prior exam. There is no evidence of bowel obstruction.    PERITONEUM: No ascites.  VESSELS:  Within normal limits.  RETROPERITONEUM: No lymphadenopathy.    BONES: Degenerative changes of the spine and left sacroiliac joint.   Fragmentation of the right femoral head, similar to prior.    IMPRESSION:     *  Large diastases of the rectus abdominis muscle with herniation of   small and large bowel into a large ventral incisional hernia. Findings   are new compared to prior examination 1/12/2019.  *  No bowel obstruction.

## 2019-04-08 NOTE — PROGRESS NOTE ADULT - ASSESSMENT
A/P:    63yof w/ HTN, DM2, COPD/CHRIS on nocturnal CPAP, discharged yesterday from [facility] after a prolonged hospitalization for SBO requiring lysis of adhesions, complicated by hypercapneic respiratory failure requiring intubation pt presented to ED w/ shortness of breath and chest tightness    General Surgery noted  CT abd/plv noted  Abdominal Superficial wound- ADAPTIC  Abdominal Binder to be worn at all times  BLE elevation  Abx per Medicine/ ID  Moisturize intact skin w/ SWEEN cream BID  con't Nutrition (as tolerated), Nutrition Consult  con't Offloading   con't Pericare  Care as per medicine will follow w/ you  Upon discharge f/u as outpatient at [facility] [address] [facility] [phone]  Seen w/ attng and D/w team  Mary Russ PA-C [id number]  We spent 25 minutes w/ this pt of which more than 50% of the time was spent counseling & coordinating care of this pt.

## 2019-04-08 NOTE — PROGRESS NOTE ADULT - SUBJECTIVE AND OBJECTIVE BOX
*************************************  NEUROLOGY FOLLOW UP NOTE  **************************************    AFTAB BASS  Female  MRN-[id number]    Subjective: no acute events reported; remains with RLE foot weakness.      VITAL SIGNS:  Vital Signs Last 24 Hrs  T(C): 36.3 (08 Apr 2019 16:34), Max: 37.3 (08 Apr 2019 00:11)  T(F): 97.3 (08 Apr 2019 16:34), Max: 99.2 (08 Apr 2019 00:11)  HR: 97 (08 Apr 2019 16:34) (89 - 99)  BP: 112/72 (08 Apr 2019 16:34) (106/75 - 138/84)  BP(mean): --  RR: 18 (08 Apr 2019 16:34) (17 - 20)  SpO2: 99% (08 Apr 2019 16:34) (99% - 100%)    PHYSICAL EXAMINATION:  General Exam:   General appearance: No acute distress                 Neurological Exam:  Mental Status: Orientated to self, date and place.    No dysarthria, aphasia or neglect.      Cranial Nerves: CN I - not tested.  PERRL, EOMI, VFF, no nystagmus or diplopia.  No facial asymmetry.  Hearing intact to finger rub bilaterally.     Motor:   Tone: normal.                  Strength: limited due to habitus in LE, 4+/5 in UE, HF, HE ~2-3/6 bilaterally, 4/5 in RLE dorsi and plantarflexion, 2/5 in RLE dorsiflexion and plantarflexion - pain limited also  Pronator drift: none                 Dysmetria: BL NL FTN  No truncal ataxia.    Tremor: + resting tremor which improves when distracted.  No myoclonus.  Sensation: intact to light touch and pinprick except for LLE dorsum decreased sensation to pinprick and touch (10% compared to 100% on other extremities)  Deep Tendon Reflexes: symmetrically diminished    Toes flexor bilaterally  Gait: deferred  LABS:                          9.9    13.16 )-----------( 428      ( 07 Apr 2019 16:30 )             32.9               RADIOLOGY & ADDITIONAL STUDIES:

## 2019-04-08 NOTE — PROGRESS NOTE ADULT - ASSESSMENT
Patient is a 63 year old female with PMHX of HTN, DM2, COPD/CHRIS on nocturnal CPAP, RA, discharged on 3/20 from [facility] after a prolonged hospitalization for SBO requiring lysis of adhesions, complicated by hypercapneic respiratory failure requiring intubation. Neuro consulted for R foot drop/weakness.     4/8: Seen at bedside with attending    Impression: R foot weakness/drop remains which is likely multifactorial, likely due to L5-S1 severe narrowing + combination of arthritis, osteoporosis from chronic steroid use, and lack of mobility. tremors: consistent with essential tremor with worsening with activity and non pill rolling and without associated rigidity.      [x] MR L spine noted with L5-S1 thecal sac compression with severe narrowing which may explain R foot drop - NeuroSx/Ortho-spine evaluation  [] c/w primidone to 50 BID  [] EMG may be pursued as outpatient given 1 month period and stable presentation.  [] PT/OT    If no intervention, further medical workup is planned, then this lady would benefit from intesive rehab as her limb muscles have significantly atrophied on palpation; resulting in profound deconditioning and outpt neuro follow up.

## 2019-04-08 NOTE — PROGRESS NOTE ADULT - ATTENDING COMMENTS
f/u exam for 64 yo AA female , s/p XLAP for SBO and subsequent closure of evisceration  Required prolonged ventilation  On chronic steroids  Currently , Cushingoid appearance, and overweight  Using nasal O2 at bedrest  Non tender large incisional hernia, documented by recent CT  No vomiting, + BMs, tolerating diet  Small area of superificial skin loss in area of hypopigmented  new skin formation  Re dressed with Adaptic   Use of abdominal discussed- re ordered  Op f/u info provided f/u exam for 64 yo AA female , s/p XLAP for SBO and subsequent closure of evisceration  Required prolonged ventilation  On chronic steroids  Currently , Cushingoid appearance, and overweight  Using nasal O2 at bedrest  Non tender large incisional hernia, documented by recent CT  No vomiting, + BMs, tolerating diet  Small area of superificial skin loss in area of hypopigmented  new skin formation  Re dressed with Adaptic   Use of abdominal binder discussed- re ordered  Op f/u info provided

## 2019-04-08 NOTE — PROGRESS NOTE ADULT - ASSESSMENT
63yof w/ HTN, DM2, COPD/CHRIS on nocturnal CPAP, discharged from [facility] after a prolonged hospitalization for SBO requiring lysis of adhesions, complicated by hypercapneic respiratory failure requiring intubation. Pt awoke with shortness of breath and chest tightness, was supposed to use CPAP last night but nursing facility did not have the machine. Pt oxygen dependent on 4L at baseline. Feeling better now with supplemental oxygen. No fevers or chills. Occasional cough.  originally admitted on 12/15 for SBO s/p ex lap with lysis of adhesions (4 retention sutures) c/b 12/30 she eviscerated 2/2 a coughing episode s/p ex lap (6 retention sutures) c/b multifocal pneumonia, developed hypercapnic respiratory failure requiring intubation 1/6, s/p bronch 1/9/18, and extubated on 1/24, course further c/b Enterococci bacteremia now readmitted to MICU for hypoxic respiratory failure 2/2 atelectasis requiring intubation 01/31. Had grown ESBL in urine, s/p ertapenem.  Extubated to high flow/ BIPAP.

## 2019-04-08 NOTE — PROGRESS NOTE ADULT - SUBJECTIVE AND OBJECTIVE BOX
pt seen and examined,  no events overnight   no chest pain , palpitation , or sob on exam     ALBUTerol/ipratropium for Nebulization 3 milliLiter(s) Nebulizer every 6 hours  buDESOnide  80 MICROgram(s)/formoterol 4.5 MICROgram(s) Inhaler 2 Puff(s) Inhalation two times a day  dextrose 40% Gel 15 Gram(s) Oral once PRN  dextrose 5%. 1000 milliLiter(s) IV Continuous <Continuous>  dextrose 50% Injectable 12.5 Gram(s) IV Push once  dextrose 50% Injectable 25 Gram(s) IV Push once  dextrose 50% Injectable 25 Gram(s) IV Push once  docusate sodium 100 milliGRAM(s) Oral three times a day  enoxaparin Injectable 40 milliGRAM(s) SubCutaneous every 24 hours  fentaNYL   Patch  25 MICROgram(s)/Hr 1 Patch Transdermal every 72 hours  furosemide    Tablet 40 milliGRAM(s) Oral two times a day  gabapentin 400 milliGRAM(s) Oral three times a day  glucagon  Injectable 1 milliGRAM(s) IntraMuscular once PRN  guaiFENesin   Syrup  (Sugar-Free) 200 milliGRAM(s) Oral every 6 hours  influenza   Vaccine 0.5 milliLiter(s) IntraMuscular once  insulin glargine Injectable (LANTUS) 20 Unit(s) SubCutaneous at bedtime  insulin lispro (HumaLOG) corrective regimen sliding scale   SubCutaneous three times a day before meals  insulin lispro Injectable (HumaLOG) 10 Unit(s) SubCutaneous three times a day before meals  levothyroxine 137 MICROGram(s) Oral daily  nystatin Powder 1 Application(s) Topical two times a day  ondansetron Injectable 4 milliGRAM(s) IV Push every 6 hours PRN  oxyCODONE    IR 10 milliGRAM(s) Oral every 4 hours PRN  pantoprazole    Tablet 40 milliGRAM(s) Oral two times a day  polyethylene glycol 3350 17 Gram(s) Oral two times a day  predniSONE   Tablet 10 milliGRAM(s) Oral daily  primidone 50 milliGRAM(s) Oral daily  senna 2 Tablet(s) Oral at bedtime  sildenafil (REVATIO) 20 milliGRAM(s) Oral three times a day  simvastatin 20 milliGRAM(s) Oral at bedtime  sodium chloride 0.65% Nasal 1 Spray(s) Both Nostrils every 4 hours PRN  sucralfate 1 Gram(s) Oral four times a day  traMADol 50 milliGRAM(s) Oral two times a day                            9.9    13.16 )-----------( 428      ( 07 Apr 2019 16:30 )             32.9       Hemoglobin: 9.9 g/dL (04-07 @ 16:30)  Hemoglobin: 10.4 g/dL (04-05 @ 09:01)            Creatinine Trend: 1.03<--, 0.74<--, 0.71<--, 0.76<--, 0.86<--, 0.97<--    COAGS:           T(C): 37.3 (04-08-19 @ 00:11), Max: 37.3 (04-08-19 @ 00:11)  HR: 89 (04-08-19 @ 06:32) (89 - 102)  BP: 125/80 (04-08-19 @ 00:11) (105/51 - 138/84)  RR: 17 (04-08-19 @ 00:11) (17 - 18)  SpO2: 100% (04-08-19 @ 06:32) (97% - 100%)  Wt(kg): --    I&O's Summary    07 Apr 2019 07:01  -  08 Apr 2019 07:00  --------------------------------------------------------  IN: 1370 mL / OUT: 400 mL / NET: 970 mL        Gen: Appears well in NAD  HEENT:  (-)icterus (-)pallor  CV: N S1 S2 1/6 OJ (+)2 Pulses B/l  Resp:  Clear to ausculatation B/L, normal effort  GI: (+) BS Soft, NT, ND  Lymph:  (-)Edema, (-)obvious lymphadenopathy  Skin: Warm to touch, Normal turgor  Psych: Appropriate mood and affect      ECG:  	sinus Tachycardia 109 BPM, nonspecific T wave abnormality    RADIOLOGY:         CXR:  < from: Xray Chest 1 View- PORTABLE-Urgent (03.21.19 @ 11:18) >  The heart is enlarged. Improvingopacities in both lungs when compared to   previous study done on March 6, 2019. Those changes are compatible with   interstitial fluid however an infectious process cannot be ruled out   entirely. Severe degenerative changes of the right shoulder. Degenerative   changes of the thoracic spine is evident as well.    < end of copied text >      ECHO: pending < from: Transthoracic Echocardiogram (03.26.19 @ 10:29) >  Conclusions:  1. Increased relative wall thickness with normal left  ventricular mass index, consistent with concentric left  ventricular remodeling.  2. Hyperdynamic left ventricular systolic function.  3. Estimated pulmonary artery systolic pressure equals 42  mm Hg, assuming right atrial pressureequals 8 mm Hg,  consistent with mild pulmonary pressures.    < end of copied text >      ASSESSMENT/PLAN: 	63y Female  HTN, DM2, COPD/CHRIS on nocturnal CPAP, normal LV function un echo 1/19 moderate pulmonary HTN discharged yesterday from [facility] after a prolonged hospitalization for SBO requiring lysis of adhesions, complicated by hypercapneic respiratory failure requiring intubation now being treated for HCAP..    -  cont All meds for pulm htn   -  keep net neg with  lasix , fluid status improving   - 3/26 ECHO ,   hyperdynamic LV fx   - no further CV work up needed at present  D/W Dr Joseph pt seen and examined,  no events overnight   no chest pain , palpitation , or sob on exam     ALBUTerol/ipratropium for Nebulization 3 milliLiter(s) Nebulizer every 6 hours  buDESOnide  80 MICROgram(s)/formoterol 4.5 MICROgram(s) Inhaler 2 Puff(s) Inhalation two times a day  dextrose 40% Gel 15 Gram(s) Oral once PRN  dextrose 5%. 1000 milliLiter(s) IV Continuous <Continuous>  dextrose 50% Injectable 12.5 Gram(s) IV Push once  dextrose 50% Injectable 25 Gram(s) IV Push once  dextrose 50% Injectable 25 Gram(s) IV Push once  docusate sodium 100 milliGRAM(s) Oral three times a day  enoxaparin Injectable 40 milliGRAM(s) SubCutaneous every 24 hours  fentaNYL   Patch  25 MICROgram(s)/Hr 1 Patch Transdermal every 72 hours  furosemide    Tablet 40 milliGRAM(s) Oral two times a day  gabapentin 400 milliGRAM(s) Oral three times a day  glucagon  Injectable 1 milliGRAM(s) IntraMuscular once PRN  guaiFENesin   Syrup  (Sugar-Free) 200 milliGRAM(s) Oral every 6 hours  influenza   Vaccine 0.5 milliLiter(s) IntraMuscular once  insulin glargine Injectable (LANTUS) 20 Unit(s) SubCutaneous at bedtime  insulin lispro (HumaLOG) corrective regimen sliding scale   SubCutaneous three times a day before meals  insulin lispro Injectable (HumaLOG) 10 Unit(s) SubCutaneous three times a day before meals  levothyroxine 137 MICROGram(s) Oral daily  nystatin Powder 1 Application(s) Topical two times a day  ondansetron Injectable 4 milliGRAM(s) IV Push every 6 hours PRN  oxyCODONE    IR 10 milliGRAM(s) Oral every 4 hours PRN  pantoprazole    Tablet 40 milliGRAM(s) Oral two times a day  polyethylene glycol 3350 17 Gram(s) Oral two times a day  predniSONE   Tablet 10 milliGRAM(s) Oral daily  primidone 50 milliGRAM(s) Oral daily  senna 2 Tablet(s) Oral at bedtime  sildenafil (REVATIO) 20 milliGRAM(s) Oral three times a day  simvastatin 20 milliGRAM(s) Oral at bedtime  sodium chloride 0.65% Nasal 1 Spray(s) Both Nostrils every 4 hours PRN  sucralfate 1 Gram(s) Oral four times a day  traMADol 50 milliGRAM(s) Oral two times a day                            9.9    13.16 )-----------( 428      ( 07 Apr 2019 16:30 )             32.9       Hemoglobin: 9.9 g/dL (04-07 @ 16:30)  Hemoglobin: 10.4 g/dL (04-05 @ 09:01)            Creatinine Trend: 1.03<--, 0.74<--, 0.71<--, 0.76<--, 0.86<--, 0.97<--    COAGS:           T(C): 37.3 (04-08-19 @ 00:11), Max: 37.3 (04-08-19 @ 00:11)  HR: 89 (04-08-19 @ 06:32) (89 - 102)  BP: 125/80 (04-08-19 @ 00:11) (105/51 - 138/84)  RR: 17 (04-08-19 @ 00:11) (17 - 18)  SpO2: 100% (04-08-19 @ 06:32) (97% - 100%)  Wt(kg): --    I&O's Summary    07 Apr 2019 07:01  -  08 Apr 2019 07:00  --------------------------------------------------------  IN: 1370 mL / OUT: 400 mL / NET: 970 mL        Gen: Appears well in NAD  HEENT:  (-)icterus (-)pallor  CV: N S1 S2 1/6 OJ (+)2 Pulses B/l  Resp:  Clear to ausculatation B/L, normal effort  GI: (+) BS Soft, NT, ND  Lymph:  (-)Edema, (-)obvious lymphadenopathy  Skin: Warm to touch, Normal turgor  Psych: Appropriate mood and affect      ECG:  	sinus Tachycardia 109 BPM, nonspecific T wave abnormality    RADIOLOGY:         CXR:  < from: Xray Chest 1 View- PORTABLE-Urgent (03.21.19 @ 11:18) >  The heart is enlarged. Improvingopacities in both lungs when compared to   previous study done on March 6, 2019. Those changes are compatible with   interstitial fluid however an infectious process cannot be ruled out   entirely. Severe degenerative changes of the right shoulder. Degenerative   changes of the thoracic spine is evident as well.    < end of copied text >      ECHO: pending < from: Transthoracic Echocardiogram (03.26.19 @ 10:29) >  Conclusions:  1. Increased relative wall thickness with normal left  ventricular mass index, consistent with concentric left  ventricular remodeling.  2. Hyperdynamic left ventricular systolic function.  3. Estimated pulmonary artery systolic pressure equals 42  mm Hg, assuming right atrial pressureequals 8 mm Hg,  consistent with mild pulmonary pressures.    < end of copied text >      ASSESSMENT/PLAN: 	63y Female  HTN, DM2, COPD/CHRIS on nocturnal CPAP, normal LV function un echo 1/19 moderate pulmonary HTN discharged from [facility] after a prolonged hospitalization for SBO requiring lysis of adhesions, complicated by hypercapneic respiratory failure requiring intubation now being treated for HCAP..    -  cont All meds for pulm htn   -  keep net neg with  lasix , fluid status improving   - 3/26 ECHO ,   hyperdynamic LV fx   - no further CV work up needed at present  D/W Dr Joseph

## 2019-04-08 NOTE — PROGRESS NOTE ADULT - ASSESSMENT
Problem/Plan - 1:  ·  Problem: HAP (hospital-acquired pneumonia).  Plan: finished antibiotics  id and pulmonary fu  will monitor.     Problem/Plan - 2:  ·  Problem: Chronic hypoxic respiratory failure , Oxygen dependent.  Plan: bipap at night   pulmonary fu.     Problem/Plan - 3:  ·  Problem: abdominal pain Plan: wound care consult fu     Problem/Plan - 4:  ·  Problem: foot drop.  Plan: rheum fu appreciated  chronic   management as per neuro     dc planning

## 2019-04-09 ENCOUNTER — TRANSCRIPTION ENCOUNTER (OUTPATIENT)
Age: 64
End: 2019-04-09

## 2019-04-09 VITALS
OXYGEN SATURATION: 97 % | DIASTOLIC BLOOD PRESSURE: 83 MMHG | TEMPERATURE: 99 F | RESPIRATION RATE: 18 BRPM | HEART RATE: 95 BPM | SYSTOLIC BLOOD PRESSURE: 143 MMHG

## 2019-04-09 DIAGNOSIS — M48.061 SPINAL STENOSIS, LUMBAR REGION WITHOUT NEUROGENIC CLAUDICATION: ICD-10-CM

## 2019-04-09 DIAGNOSIS — K56.609 UNSPECIFIED INTESTINAL OBSTRUCTION, UNSPECIFIED AS TO PARTIAL VERSUS COMPLETE OBSTRUCTION: ICD-10-CM

## 2019-04-09 LAB
GLUCOSE BLDC GLUCOMTR-MCNC: 178 MG/DL — HIGH (ref 70–99)
GLUCOSE BLDC GLUCOMTR-MCNC: 190 MG/DL — HIGH (ref 70–99)
GLUCOSE BLDC GLUCOMTR-MCNC: 222 MG/DL — HIGH (ref 70–99)

## 2019-04-09 PROCEDURE — 84484 ASSAY OF TROPONIN QUANT: CPT

## 2019-04-09 PROCEDURE — 83540 ASSAY OF IRON: CPT

## 2019-04-09 PROCEDURE — 94640 AIRWAY INHALATION TREATMENT: CPT

## 2019-04-09 PROCEDURE — 87040 BLOOD CULTURE FOR BACTERIA: CPT

## 2019-04-09 PROCEDURE — 72141 MRI NECK SPINE W/O DYE: CPT

## 2019-04-09 PROCEDURE — 87486 CHLMYD PNEUM DNA AMP PROBE: CPT

## 2019-04-09 PROCEDURE — 93970 EXTREMITY STUDY: CPT

## 2019-04-09 PROCEDURE — 99232 SBSQ HOSP IP/OBS MODERATE 35: CPT | Mod: GC

## 2019-04-09 PROCEDURE — 93306 TTE W/DOPPLER COMPLETE: CPT

## 2019-04-09 PROCEDURE — 86923 COMPATIBILITY TEST ELECTRIC: CPT

## 2019-04-09 PROCEDURE — 70486 CT MAXILLOFACIAL W/O DYE: CPT

## 2019-04-09 PROCEDURE — 74176 CT ABD & PELVIS W/O CONTRAST: CPT

## 2019-04-09 PROCEDURE — 85610 PROTHROMBIN TIME: CPT

## 2019-04-09 PROCEDURE — 87798 DETECT AGENT NOS DNA AMP: CPT

## 2019-04-09 PROCEDURE — 86900 BLOOD TYPING SEROLOGIC ABO: CPT

## 2019-04-09 PROCEDURE — 70450 CT HEAD/BRAIN W/O DYE: CPT

## 2019-04-09 PROCEDURE — 83735 ASSAY OF MAGNESIUM: CPT

## 2019-04-09 PROCEDURE — 87633 RESP VIRUS 12-25 TARGETS: CPT

## 2019-04-09 PROCEDURE — 86901 BLOOD TYPING SEROLOGIC RH(D): CPT

## 2019-04-09 PROCEDURE — 80053 COMPREHEN METABOLIC PANEL: CPT

## 2019-04-09 PROCEDURE — 86850 RBC ANTIBODY SCREEN: CPT

## 2019-04-09 PROCEDURE — 36600 WITHDRAWAL OF ARTERIAL BLOOD: CPT

## 2019-04-09 PROCEDURE — 83880 ASSAY OF NATRIURETIC PEPTIDE: CPT

## 2019-04-09 PROCEDURE — 99285 EMERGENCY DEPT VISIT HI MDM: CPT | Mod: 25

## 2019-04-09 PROCEDURE — 80202 ASSAY OF VANCOMYCIN: CPT

## 2019-04-09 PROCEDURE — 72148 MRI LUMBAR SPINE W/O DYE: CPT

## 2019-04-09 PROCEDURE — 80048 BASIC METABOLIC PNL TOTAL CA: CPT

## 2019-04-09 PROCEDURE — P9011: CPT

## 2019-04-09 PROCEDURE — 85027 COMPLETE CBC AUTOMATED: CPT

## 2019-04-09 PROCEDURE — 83550 IRON BINDING TEST: CPT

## 2019-04-09 PROCEDURE — 82962 GLUCOSE BLOOD TEST: CPT

## 2019-04-09 PROCEDURE — 97110 THERAPEUTIC EXERCISES: CPT

## 2019-04-09 PROCEDURE — 86200 CCP ANTIBODY: CPT

## 2019-04-09 PROCEDURE — 82803 BLOOD GASES ANY COMBINATION: CPT

## 2019-04-09 PROCEDURE — 82728 ASSAY OF FERRITIN: CPT

## 2019-04-09 PROCEDURE — 36430 TRANSFUSION BLD/BLD COMPNT: CPT

## 2019-04-09 PROCEDURE — 73590 X-RAY EXAM OF LOWER LEG: CPT

## 2019-04-09 PROCEDURE — 82272 OCCULT BLD FECES 1-3 TESTS: CPT

## 2019-04-09 PROCEDURE — 93005 ELECTROCARDIOGRAM TRACING: CPT

## 2019-04-09 PROCEDURE — 73562 X-RAY EXAM OF KNEE 3: CPT

## 2019-04-09 PROCEDURE — 73521 X-RAY EXAM HIPS BI 2 VIEWS: CPT

## 2019-04-09 PROCEDURE — 97530 THERAPEUTIC ACTIVITIES: CPT

## 2019-04-09 PROCEDURE — 97162 PT EVAL MOD COMPLEX 30 MIN: CPT

## 2019-04-09 PROCEDURE — 94660 CPAP INITIATION&MGMT: CPT

## 2019-04-09 PROCEDURE — 71045 X-RAY EXAM CHEST 1 VIEW: CPT

## 2019-04-09 PROCEDURE — 87581 M.PNEUMON DNA AMP PROBE: CPT

## 2019-04-09 PROCEDURE — 86431 RHEUMATOID FACTOR QUANT: CPT

## 2019-04-09 PROCEDURE — 73552 X-RAY EXAM OF FEMUR 2/>: CPT

## 2019-04-09 PROCEDURE — 71275 CT ANGIOGRAPHY CHEST: CPT

## 2019-04-09 PROCEDURE — 85730 THROMBOPLASTIN TIME PARTIAL: CPT

## 2019-04-09 PROCEDURE — 83036 HEMOGLOBIN GLYCOSYLATED A1C: CPT

## 2019-04-09 RX ORDER — INSULIN LISPRO 100/ML
15 VIAL (ML) SUBCUTANEOUS
Qty: 0 | Refills: 0 | COMMUNITY

## 2019-04-09 RX ORDER — INSULIN LISPRO 100/ML
0 VIAL (ML) SUBCUTANEOUS
Qty: 0 | Refills: 0 | COMMUNITY

## 2019-04-09 RX ORDER — NYSTATIN CREAM 100000 [USP'U]/G
1 CREAM TOPICAL
Qty: 0 | Refills: 0 | DISCHARGE
Start: 2019-04-09

## 2019-04-09 RX ORDER — PRIMIDONE 250 MG/1
1 TABLET ORAL
Qty: 0 | Refills: 0 | DISCHARGE
Start: 2019-04-09

## 2019-04-09 RX ORDER — SUCRALFATE 1 G
1 TABLET ORAL
Qty: 0 | Refills: 0 | DISCHARGE
Start: 2019-04-09

## 2019-04-09 RX ORDER — INSULIN LISPRO 100/ML
11 VIAL (ML) SUBCUTANEOUS
Qty: 0 | Refills: 0 | COMMUNITY

## 2019-04-09 RX ADMIN — OXYCODONE HYDROCHLORIDE 10 MILLIGRAM(S): 5 TABLET ORAL at 12:19

## 2019-04-09 RX ADMIN — OXYCODONE HYDROCHLORIDE 10 MILLIGRAM(S): 5 TABLET ORAL at 15:31

## 2019-04-09 RX ADMIN — Medication 10 UNIT(S): at 18:01

## 2019-04-09 RX ADMIN — Medication 137 MICROGRAM(S): at 05:06

## 2019-04-09 RX ADMIN — OXYCODONE HYDROCHLORIDE 10 MILLIGRAM(S): 5 TABLET ORAL at 05:06

## 2019-04-09 RX ADMIN — BUDESONIDE AND FORMOTEROL FUMARATE DIHYDRATE 2 PUFF(S): 160; 4.5 AEROSOL RESPIRATORY (INHALATION) at 05:11

## 2019-04-09 RX ADMIN — Medication 10 MILLIGRAM(S): at 05:07

## 2019-04-09 RX ADMIN — Medication 20 MILLIGRAM(S): at 12:23

## 2019-04-09 RX ADMIN — Medication 1: at 08:28

## 2019-04-09 RX ADMIN — GABAPENTIN 400 MILLIGRAM(S): 400 CAPSULE ORAL at 12:23

## 2019-04-09 RX ADMIN — Medication 200 MILLIGRAM(S): at 06:35

## 2019-04-09 RX ADMIN — PANTOPRAZOLE SODIUM 40 MILLIGRAM(S): 20 TABLET, DELAYED RELEASE ORAL at 18:04

## 2019-04-09 RX ADMIN — Medication 100 MILLIGRAM(S): at 05:06

## 2019-04-09 RX ADMIN — OXYCODONE HYDROCHLORIDE 10 MILLIGRAM(S): 5 TABLET ORAL at 10:26

## 2019-04-09 RX ADMIN — Medication 3 MILLILITER(S): at 00:51

## 2019-04-09 RX ADMIN — OXYCODONE HYDROCHLORIDE 10 MILLIGRAM(S): 5 TABLET ORAL at 16:36

## 2019-04-09 RX ADMIN — Medication 100 MILLIGRAM(S): at 12:22

## 2019-04-09 RX ADMIN — Medication 1: at 18:01

## 2019-04-09 RX ADMIN — Medication 1 GRAM(S): at 12:21

## 2019-04-09 RX ADMIN — Medication 20 MILLIGRAM(S): at 06:35

## 2019-04-09 RX ADMIN — TRAMADOL HYDROCHLORIDE 50 MILLIGRAM(S): 50 TABLET ORAL at 06:35

## 2019-04-09 RX ADMIN — Medication 10 UNIT(S): at 08:28

## 2019-04-09 RX ADMIN — GABAPENTIN 400 MILLIGRAM(S): 400 CAPSULE ORAL at 05:07

## 2019-04-09 RX ADMIN — NYSTATIN CREAM 1 APPLICATION(S): 100000 CREAM TOPICAL at 06:35

## 2019-04-09 RX ADMIN — POLYETHYLENE GLYCOL 3350 17 GRAM(S): 17 POWDER, FOR SOLUTION ORAL at 06:35

## 2019-04-09 RX ADMIN — Medication 1 GRAM(S): at 18:04

## 2019-04-09 RX ADMIN — Medication 200 MILLIGRAM(S): at 12:20

## 2019-04-09 RX ADMIN — TRAMADOL HYDROCHLORIDE 50 MILLIGRAM(S): 50 TABLET ORAL at 18:03

## 2019-04-09 RX ADMIN — OXYCODONE HYDROCHLORIDE 10 MILLIGRAM(S): 5 TABLET ORAL at 06:01

## 2019-04-09 RX ADMIN — Medication 40 MILLIGRAM(S): at 18:02

## 2019-04-09 RX ADMIN — ENOXAPARIN SODIUM 40 MILLIGRAM(S): 100 INJECTION SUBCUTANEOUS at 12:23

## 2019-04-09 RX ADMIN — Medication 10 UNIT(S): at 12:20

## 2019-04-09 RX ADMIN — Medication 2: at 12:20

## 2019-04-09 RX ADMIN — Medication 3 MILLILITER(S): at 18:02

## 2019-04-09 RX ADMIN — BUDESONIDE AND FORMOTEROL FUMARATE DIHYDRATE 2 PUFF(S): 160; 4.5 AEROSOL RESPIRATORY (INHALATION) at 18:02

## 2019-04-09 RX ADMIN — Medication 3 MILLILITER(S): at 05:12

## 2019-04-09 RX ADMIN — Medication 40 MILLIGRAM(S): at 06:34

## 2019-04-09 RX ADMIN — PRIMIDONE 50 MILLIGRAM(S): 250 TABLET ORAL at 12:57

## 2019-04-09 RX ADMIN — Medication 500 MILLIGRAM(S): at 06:34

## 2019-04-09 RX ADMIN — Medication 200 MILLIGRAM(S): at 18:02

## 2019-04-09 RX ADMIN — Medication 1 GRAM(S): at 05:06

## 2019-04-09 RX ADMIN — PANTOPRAZOLE SODIUM 40 MILLIGRAM(S): 20 TABLET, DELAYED RELEASE ORAL at 06:34

## 2019-04-09 RX ADMIN — Medication 3 MILLILITER(S): at 12:20

## 2019-04-09 RX ADMIN — FENTANYL CITRATE 1 PATCH: 50 INJECTION INTRAVENOUS at 06:41

## 2019-04-09 NOTE — PROGRESS NOTE ADULT - ATTENDING COMMENTS
seems to be stable from pulmonary point of view!  4/09: she has been stable from pulmonary point of view: cont bipap at night and oxygen 24 hours a day: Finished antibiotics for pneumonia:

## 2019-04-09 NOTE — DISCHARGE NOTE NURSING/CASE MANAGEMENT/SOCIAL WORK - NSDCDPATPORTLINK_GEN_ALL_CORE
You can access the [facility], offered by [facility], by registering with the following website: [email or web address]

## 2019-04-09 NOTE — PROGRESS NOTE ADULT - PROBLEM SELECTOR PROBLEM 1
Anemia
Respiratory failure with hypercapnia, unspecified chronicity
Anemia
HAP (hospital-acquired pneumonia)
Respiratory failure with hypercapnia, unspecified chronicity
Anemia
HAP (hospital-acquired pneumonia)
HAP (hospital-acquired pneumonia)
Respiratory failure with hypercapnia, unspecified chronicity
HAP (hospital-acquired pneumonia)

## 2019-04-09 NOTE — PROGRESS NOTE ADULT - PROBLEM SELECTOR PROBLEM 6
Adult Hypothyroidism
Diabetes

## 2019-04-09 NOTE — PROGRESS NOTE ADULT - PROBLEM SELECTOR PROBLEM 4
ACP (advance care planning)
Chronic obstructive pulmonary disease (COPD)
Oxygen dependent

## 2019-04-09 NOTE — PROGRESS NOTE ADULT - PROBLEM SELECTOR PLAN 5
monitor per primary team  3/25: blood glucose controlled!  3/26: controlled  3/27: Blood glcuse controlled!  3/29: controlled  3/30: Controlled!
cont oxygen
monitor per primary team
monitor per primary team
monitor per primary team  3/24: blood glucose controlled!
monitor per primary team  3/25: blood glucose controlled!  3/26: controlled
monitor per primary team  3/25: blood glucose controlled!  3/26: controlled  3/27: Blood glcuse controlled!
monitor per primary team  3/25: blood glucose controlled!  3/26: controlled  3/27: Blood glcuse controlled!
monitor per primary team  3/25: blood glucose controlled!  3/26: controlled  3/27: Blood glcuse controlled!  3/29: controlled
stable
stable
stable  4/2: controlled!
stable  4/2: controlled!  4/3: Controlled

## 2019-04-09 NOTE — PROGRESS NOTE ADULT - ASSESSMENT
62 yo F w/ hx of longstanding advanced erosive RA p/w PNA, subsequently developed diffuse joint pains.  Considering lack of improvement after several days of higher doses of prednisone , joint pain is more likely due to arthritic damage and not active synovitis.    There is no joint swelling seen on exam.  Changed prednisone back to her home dose of 10mg daily  Patient describing a "burning" sensation, suggestive of  neuropathy , possibly due to DM, cervical dz another possibility.    - Continue gabapentin  - Continue prednisone 10mg daily  - Given naproxen 375mg BID enteric coated  - PPI while on NSAID  - Would have patient f/u with her outpt rheumatologist in 1-2 weeks    Discharge to rehab, rheumatology to sign off.  Discussed with primary    Kosta Nicole MD  Rheumatology Fellow PGY IV 64 yo F w/ hx of longstanding advanced erosive RA p/w PNA, subsequently developed diffuse joint pains.  Considering lack of improvement after several days of higher doses of prednisone , joint pain is more likely due to arthritic damage and not active synovitis.    There is no joint swelling seen on exam.  Changed prednisone back to her home dose of 10mg daily  Patient describing a "burning" sensation, suggestive of  neuropathy , possibly due to DM, cervical dz another possibility.    - Continue gabapentin  - Continue prednisone 10mg daily  - Given naproxen 375mg BID enteric coated  - PPI while on NSAID  - Would have patient f/u with her outpt rheumatologist in 1-2 weeks  - clarify with radiology is presence of c1/c2 damage on MRI    Discharge to rehab, rheumatology to sign off.  Discussed with primary    Kosta Nicole MD  Rheumatology Fellow PGY IV

## 2019-04-09 NOTE — PROGRESS NOTE ADULT - SUBJECTIVE AND OBJECTIVE BOX
AFTAB BASS  [id number]    INTERVAL HPI/OVERNIGHT EVENTS:    No acute overnight events. Going to rehab. Had questions regarding her medications.    MEDICATIONS  (STANDING):  ALBUTerol/ipratropium for Nebulization 3 milliLiter(s) Nebulizer every 6 hours  buDESOnide  80 MICROgram(s)/formoterol 4.5 MICROgram(s) Inhaler 2 Puff(s) Inhalation two times a day  dextrose 5%. 1000 milliLiter(s) (50 mL/Hr) IV Continuous <Continuous>  dextrose 50% Injectable 12.5 Gram(s) IV Push once  dextrose 50% Injectable 25 Gram(s) IV Push once  dextrose 50% Injectable 25 Gram(s) IV Push once  docusate sodium 100 milliGRAM(s) Oral three times a day  enoxaparin Injectable 40 milliGRAM(s) SubCutaneous every 24 hours  fentaNYL   Patch  25 MICROgram(s)/Hr 1 Patch Transdermal every 72 hours  furosemide    Tablet 40 milliGRAM(s) Oral two times a day  gabapentin 400 milliGRAM(s) Oral three times a day  guaiFENesin   Syrup  (Sugar-Free) 200 milliGRAM(s) Oral every 6 hours  influenza   Vaccine 0.5 milliLiter(s) IntraMuscular once  insulin glargine Injectable (LANTUS) 20 Unit(s) SubCutaneous at bedtime  insulin lispro (HumaLOG) corrective regimen sliding scale   SubCutaneous three times a day before meals  insulin lispro Injectable (HumaLOG) 10 Unit(s) SubCutaneous three times a day before meals  levothyroxine 137 MICROGram(s) Oral daily  naproxen 375 milliGRAM(s) Oral two times a day  nystatin Powder 1 Application(s) Topical two times a day  pantoprazole    Tablet 40 milliGRAM(s) Oral two times a day  polyethylene glycol 3350 17 Gram(s) Oral two times a day  predniSONE   Tablet 10 milliGRAM(s) Oral daily  primidone 50 milliGRAM(s) Oral daily  senna 2 Tablet(s) Oral at bedtime  sildenafil (REVATIO) 20 milliGRAM(s) Oral three times a day  simvastatin 20 milliGRAM(s) Oral at bedtime  sucralfate 1 Gram(s) Oral four times a day  traMADol 50 milliGRAM(s) Oral two times a day    MEDICATIONS  (PRN):  dextrose 40% Gel 15 Gram(s) Oral once PRN Blood Glucose LESS THAN 70 milliGRAM(s)/deciliter  glucagon  Injectable 1 milliGRAM(s) IntraMuscular once PRN Glucose LESS THAN 70 milligrams/deciliter  ondansetron Injectable 4 milliGRAM(s) IV Push every 6 hours PRN Nausea  oxyCODONE    IR 10 milliGRAM(s) Oral every 4 hours PRN Moderate Pain (4 - 6)  sodium chloride 0.65% Nasal 1 Spray(s) Both Nostrils every 4 hours PRN Nasal Congestion      Allergies    No Known Allergies    Intolerances    Seroquel (Other)      Review of Systems:   General: No fevers/chills, no fatigue  HEENT: No blurry vision, dysphagia, or odynophagia  CVS: No CP/palpitations  Resp: No SOB/wheezing  GI: No N/V/C/D/abdominal pain  MSK: +joint pains  Skin: No new rashes  Neuro: No headaches        Vital Signs Last 24 Hrs  T(C): 37.1 (09 Apr 2019 17:08), Max: 37.1 (09 Apr 2019 17:08)  T(F): 98.8 (09 Apr 2019 17:08), Max: 98.8 (09 Apr 2019 17:08)  HR: 95 (09 Apr 2019 17:08) (87 - 100)  BP: 143/83 (09 Apr 2019 17:08) (105/70 - 143/83)  BP(mean): --  RR: 18 (09 Apr 2019 17:08) (18 - 19)  SpO2: 97% (09 Apr 2019 17:08) (92% - 100%)    Physical Exam:  General: NAD  HEENT: EOMI, MMM  Cardio: +S1/S2, RRR  Resp: CTA b/l  GI: +BS, soft, NT/ND  MSK: no joint swelling, R 3rd digit swan neck deformity, subluxations, MCP hypertrophy, diffuse tenderness to palpation  Neuro: AAOx3, weak handgrip  Psych: wnl      LABS:                  RADIOLOGY & ADDITIONAL TESTS:        EXAM:  MR SPINE CERVICAL                            PROCEDURE DATE:  04/04/2019            INTERPRETATION:  History: Cervical myelopathy.    MRI of the cervical spine was performed using sagittal T1 T2 T2 weighted   sequence with fat suppression and STIR sequence. Axial T2 and 3-D fiesta   sequences performed as well.    Reversal of the normal cervical lordosis is seen with abnormal kyphotic   deformity involving the cervical spine.    Mild compression deformity without retropulsed fragments are seen   involving the C4 and C6 vertebral bodies with normal signal involving   these vertebral bodies.    Disc desiccation is seen throughout the cervical and upper thoracic spine   region which is secondary degenerative change    C2-3: Central to left-sided disc herniation is seen. Effacement of the   ventral thecal sac is seen. Bilateral hypertrophic facet joint changes   are seen. Mild narrowing of the spinal canal and both neural foramen    C3-4: Disc bulge is seen which is more prominent onthe left side.   Bilateral hypertrophic facet joint changes are seen. Moderate to severe   narrowing of both neural foramen is seen left greater than right.    C4-5: Central disc osteophyte complex is seen. Bilateral hypertrophic   facet joint changes are seen. Effacement of the ventral thecal sac is   seen. This finding does appear to abut the spinal cord. Moderate   narrowing of the left neural foramen and severe narrowing of the right   neural foramen    C5-6: Disc bulge and bilateral hypertrophic facet joint changes are seen.   Mild to moderate narrowing of the spinal canal is seen. Severe narrowing   of the left neural foramen and moderate narrowing the right neural foramen    C6-7: Disc bulge is identified. Mild narrowing of the spinal canal.   Moderate narrowing of both neural foramen    C7-T1: Bilateral hypertrophic facet joint changes seen. Moderate   narrowing of the right neural foramen and severe narrowing of the left   neural foramen    The spinal cord demonstrates normal signal and caliber.    Evaluation of the paraspinal soft tissues appear normal    Inflammatory change is seen involving both mastoids.    Impression: Extensive degenerative changes as described above. AFTAB BASS  [id number]    INTERVAL HPI/OVERNIGHT EVENTS:    No acute overnight events. Going to rehab. Had questions regarding her medications.  Patient notes increased pain in the jints and in her neck when she is not on naprosyn.    Patients son at the bedside and contributes to the history and plan.     MEDICATIONS  (STANDING):  ALBUTerol/ipratropium for Nebulization 3 milliLiter(s) Nebulizer every 6 hours  buDESOnide  80 MICROgram(s)/formoterol 4.5 MICROgram(s) Inhaler 2 Puff(s) Inhalation two times a day  dextrose 5%. 1000 milliLiter(s) (50 mL/Hr) IV Continuous <Continuous>  dextrose 50% Injectable 12.5 Gram(s) IV Push once  dextrose 50% Injectable 25 Gram(s) IV Push once  dextrose 50% Injectable 25 Gram(s) IV Push once  docusate sodium 100 milliGRAM(s) Oral three times a day  enoxaparin Injectable 40 milliGRAM(s) SubCutaneous every 24 hours  fentaNYL   Patch  25 MICROgram(s)/Hr 1 Patch Transdermal every 72 hours  furosemide    Tablet 40 milliGRAM(s) Oral two times a day  gabapentin 400 milliGRAM(s) Oral three times a day  guaiFENesin   Syrup  (Sugar-Free) 200 milliGRAM(s) Oral every 6 hours  influenza   Vaccine 0.5 milliLiter(s) IntraMuscular once  insulin glargine Injectable (LANTUS) 20 Unit(s) SubCutaneous at bedtime  insulin lispro (HumaLOG) corrective regimen sliding scale   SubCutaneous three times a day before meals  insulin lispro Injectable (HumaLOG) 10 Unit(s) SubCutaneous three times a day before meals  levothyroxine 137 MICROGram(s) Oral daily  naproxen 375 milliGRAM(s) Oral two times a day  nystatin Powder 1 Application(s) Topical two times a day  pantoprazole    Tablet 40 milliGRAM(s) Oral two times a day  polyethylene glycol 3350 17 Gram(s) Oral two times a day  predniSONE   Tablet 10 milliGRAM(s) Oral daily  primidone 50 milliGRAM(s) Oral daily  senna 2 Tablet(s) Oral at bedtime  sildenafil (REVATIO) 20 milliGRAM(s) Oral three times a day  simvastatin 20 milliGRAM(s) Oral at bedtime  sucralfate 1 Gram(s) Oral four times a day  traMADol 50 milliGRAM(s) Oral two times a day    MEDICATIONS  (PRN):  dextrose 40% Gel 15 Gram(s) Oral once PRN Blood Glucose LESS THAN 70 milliGRAM(s)/deciliter  glucagon  Injectable 1 milliGRAM(s) IntraMuscular once PRN Glucose LESS THAN 70 milligrams/deciliter  ondansetron Injectable 4 milliGRAM(s) IV Push every 6 hours PRN Nausea  oxyCODONE    IR 10 milliGRAM(s) Oral every 4 hours PRN Moderate Pain (4 - 6)  sodium chloride 0.65% Nasal 1 Spray(s) Both Nostrils every 4 hours PRN Nasal Congestion      Allergies    No Known Allergies    Intolerances    Seroquel (Other)      Review of Systems:   General: No fevers/chills, no fatigue  HEENT: No blurry vision, dysphagia, or odynophagia  CVS: No CP/palpitations  Resp: No SOB/wheezing  GI: No N/V/C/D/abdominal pain  MSK: +joint pains  Skin: No new rashes  Neuro: No headaches        Vital Signs Last 24 Hrs  T(C): 37.1 (09 Apr 2019 17:08), Max: 37.1 (09 Apr 2019 17:08)  T(F): 98.8 (09 Apr 2019 17:08), Max: 98.8 (09 Apr 2019 17:08)  HR: 95 (09 Apr 2019 17:08) (87 - 100)  BP: 143/83 (09 Apr 2019 17:08) (105/70 - 143/83)  BP(mean): --  RR: 18 (09 Apr 2019 17:08) (18 - 19)  SpO2: 97% (09 Apr 2019 17:08) (92% - 100%)    Physical Exam:  General: NAD  HEENT: EOMI, MMM  Cardio: +S1/S2, RRR  Resp: CTA b/l  GI: +BS, soft, NT/ND  MSK: no joint swelling, R 3rd digit swan neck deformity, subluxations, MCP hypertrophy, diffuse tenderness to palpation  Neuro: AAOx3, weak handgrip  Psych: wnl      LABS:    Complete Blood Count in AM (04.07.19 @ 16:30)    WBC Count: 13.16 K/uL    RBC Count: 3.54 M/uL    Hemoglobin: 9.9 g/dL    Hematocrit: 32.9 %    Mean Cell Volume: 92.9 fl    Mean Cell Hemoglobin: 28.0 pg    Mean Cell Hemoglobin Conc: 30.1 gm/dL    Red Cell Distrib Width: 16.0 %    Platelet Count - Automated: 428 K/uL       Basic Metabolic Panel in AM (04.05.19 @ 08:55)    Creatinine, Serum: 1.03 mg/dL        RADIOLOGY & ADDITIONAL TESTS:        EXAM:  MR SPINE CERVICAL                            PROCEDURE DATE:  04/04/2019            INTERPRETATION:  History: Cervical myelopathy.    MRI of the cervical spine was performed using sagittal T1 T2 T2 weighted   sequence with fat suppression and STIR sequence. Axial T2 and 3-D fiesta   sequences performed as well.    Reversal of the normal cervical lordosis is seen with abnormal kyphotic   deformity involving the cervical spine.    Mild compression deformity without retropulsed fragments are seen   involving the C4 and C6 vertebral bodies with normal signal involving   these vertebral bodies.    Disc desiccation is seen throughout the cervical and upper thoracic spine   region which is secondary degenerative change    C2-3: Central to left-sided disc herniation is seen. Effacement of the   ventral thecal sac is seen. Bilateral hypertrophic facet joint changes   are seen. Mild narrowing of the spinal canal and both neural foramen    C3-4: Disc bulge is seen which is more prominent onthe left side.   Bilateral hypertrophic facet joint changes are seen. Moderate to severe   narrowing of both neural foramen is seen left greater than right.    C4-5: Central disc osteophyte complex is seen. Bilateral hypertrophic   facet joint changes are seen. Effacement of the ventral thecal sac is   seen. This finding does appear to abut the spinal cord. Moderate   narrowing of the left neural foramen and severe narrowing of the right   neural foramen    C5-6: Disc bulge and bilateral hypertrophic facet joint changes are seen.   Mild to moderate narrowing of the spinal canal is seen. Severe narrowing   of the left neural foramen and moderate narrowing the right neural foramen    C6-7: Disc bulge is identified. Mild narrowing of the spinal canal.   Moderate narrowing of both neural foramen    C7-T1: Bilateral hypertrophic facet joint changes seen. Moderate   narrowing of the right neural foramen and severe narrowing of the left   neural foramen    The spinal cord demonstrates normal signal and caliber.    Evaluation of the paraspinal soft tissues appear normal    Inflammatory change is seen involving both mastoids.    Impression: Extensive degenerative changes as described above.

## 2019-04-09 NOTE — PROGRESS NOTE ADULT - REASON FOR ADMISSION
sob

## 2019-04-09 NOTE — PROGRESS NOTE ADULT - ASSESSMENT
63 year old female with HTN, DM2, COPD/CHRIS on nocturnal CPAP, discharged 3/20 from [facility] after a prolonged hospitalization for SBO requiring lysis of adhesions, complicated by hypercapnic respiratory failure requiring intubation. GI consulted for anemia.

## 2019-04-09 NOTE — PROGRESS NOTE ADULT - PROBLEM SELECTOR PROBLEM 5
Diabetes
Oxygen dependent

## 2019-04-09 NOTE — PROGRESS NOTE ADULT - NSHPATTENDINGPLANDISCUSS_GEN_ALL_CORE
primary team
pt
team
primary team
primary team
team
NP
NP
NP on floor:[id number]
NP on floor:[id number]
NP
Pt
np
Pt
Pt
NP
NP
Med NP

## 2019-04-09 NOTE — PROGRESS NOTE ADULT - PROBLEM SELECTOR PLAN 6
on levothyroxine!
controlled!
controlled!  4/9: Blood glucose controlled!
on levothyroxine!
stable  4/2: controlled!  4/3: Controlled
stable  4/2: controlled!  4/3: Controlled  4/5: stable
stable  4/2: controlled!  4/3: Controlled  4/5: stable  4/6 stable
stable  4/2: controlled!  4/3: Controlled  4/5: stable  4/6 stable  4/7 controlled with current sliding scale

## 2019-04-09 NOTE — PROGRESS NOTE ADULT - PROBLEM SELECTOR PLAN 3
- care s per pulmonary appreciated, s/p course of abx
- IV abx as per pulmonary appreciated, s/p course
- care per pulmonary appreciated, s/p course of abx
- care s per pulmonary appreciated, s/p course of abx
CT scan abdomen  noted: surgery and gi follow up!
CT scan abdomen  noted: surgery and gi follow up!  4/5: no pain : BS +: noted surgical input : for outpatient follow up
CT scan abdomen  noted: surgery and gi follow up!  4/5: no pain : BS +: noted surgical input : for outpatient follow up  4/6 defer to GI
CT scan abdomen  noted: surgery and gi follow up!  4/5: no pain : BS +: noted surgical input : for outpatient follow up  4/6 defer to GI  4/7 Defer to GI
CT scan abdomen  noted: surgery and gi follow up!  per primary team: surgery1
CT scan abdomen  noted: surgery and gi follow up!  per primary team: surgery1
Cont BD: She still has mild wheezing: Cont bipap at night:
Cont BD: She still has mild wheezing: Cont bipap at night:
Cont BD: She still has mild wheezing: Cont bipap at night:  3/24: cont bipap
Cont BD: She still has mild wheezing: Cont bipap at night:  3/24: cont bipap  3/26: no wheezing: cont bd and bipap at night time:
Cont BD: She still has mild wheezing: Cont bipap at night:  3/24: cont bipap  3/26: no wheezing: cont bd and bipap at night time:  3/27: Cont BD: Check x-ray today
Cont BD: She still has mild wheezing: Cont bipap at night:  3/24: cont bipap  3/26: no wheezing: cont bd and bipap at night time:  3/27: Cont BD: Check x-ray today
Cont BD: She still has mild wheezing: Cont bipap at night:  3/24: cont bipap  3/26: no wheezing: cont bd and bipap at night time:  3/27: Cont BD: Check x-ray today  3/29: controlled: no wheezing
seems ot be doing pretty well: no wheezing: Cont bi pap at night:
seems ot be doing pretty well: no wheezing: Cont bi pap at night:  4/1: cont bipap at night:
seems ot be doing pretty well: no wheezing: Cont bi pap at night:  4/1: cont bipap at night:  4/2: no wheezing: cont BD as wellas bipap at night time~!
seems ot be doing pretty well: no wheezing: Cont bi pap at night:  4/1: cont bipap at night:  4/2: no wheezing: cont BD as wellas bipap at night time~!  4/3: Stable: no wheezing
Cont BD: She still has mild wheezing: Cont bipap at night:  3/24: cont bipap  3/26: no wheezing: cont bd and bipap at night time:  3/27: Cont BD: Check x-ray today  3/29: controlled: no wheezing  3/30: stable;: cont bipap at night time:

## 2019-04-09 NOTE — PROGRESS NOTE ADULT - PROBLEM SELECTOR PLAN 2
- s/p ex-lap 12/30 with findings of healthy bowel c/b evisceration of bowel requiring placement of retention sutures  - cw bowel regimen with senna 2 tabs at bedtime, colace 100 mg TID, Miralax 17 grams BID for constipation  - monitor for bms
- s/p ex-lap 12/30 with findings of healthy bowel c/b evisceration of bowel requiring placement of retention sutures  - cw bowel regimen with senna 2 tabs at bedtime, colace 100 mg TID, Miralax 17 grams BID for constipation  - ct reviewed with large diastases of the rectus abdominis muscle with herniation of   small and large bowel into a large ventral incisional hernia. Findings   are new compared to prior examination 1/12/2019.  - surgery following, appreciate their recommendations
- s/p ex-lap 12/30 with findings of healthy bowel c/b evisceration of bowel requiring placement of retention sutures  - cw bowel regimen with senna 2 tabs at bedtime, colace 100 mg TID, Miralax 17 grams BID for constipation  - ct reviewed with large diastases of the rectus abdominis muscle with herniation of   small and large bowel into a large ventral incisional hernia. Findings   are new compared to prior examination 1/12/2019.  - surgery following, appreciate their recommendations. Outpatient follow up for elective hernia repair.
- s/p ex-lap 12/30 with findings of healthy bowel c/b evisceration of bowel requiring placement of retention sutures  - cw bowel regimen with senna 2 tabs at bedtime, colace 100 mg TID, Miralax 17 grams BID for constipation  - monitor for bms
-monitor vanco trough and creatinine  -Continue vanco to 750 mg iv q12
completed 7 days of antibiotics:
completed 7 days of antibiotics:
completed 7 days of antibiotics:  4/2: stable!
completed 7 days of antibiotics:  4/2: stable!  4/3: Resp wise haleigh chau;e: finished treamtent for pneumoni: issa need repeat ct scan chest in 8 weeks time
completed 7 days of antibiotics:  4/2: stable!  4/3: Resp wise she is stable: finished treatment for pneumon: issa need repeat ct scan chest in 8 weeks time  4/4: resolved
completed 7 days of antibiotics:  4/2: stable!  4/3: Resp wise she is stable: finished treatment for pneumon: issa need repeat ct scan chest in 8 weeks time  4/4: resolved  4/5: finished antibiotics: has minor phlegm on coughing:
completed 7 days of antibiotics:  : stable!  4/3: Resp wise she is stable: finished treatment for pneumon: issa need repeat ct scan chest in 8 weeks time  : resolved  : finished antibiotics: has minor phlegm on coughin/6 treated with ABTs. Afebrile.
completed 7 days of antibiotics:  : stable!  4/3: Resp wise she is stable: finished treatment for pneumon: issa need repeat ct scan chest in 8 weeks time  : resolved  : finished antibiotics: has minor phlegm on coughin/6 treated with ABTs. Afebrile.   off antibiotics. afebrile. no CBC to review
cont antibiotics:
cont antibiotics:
cont antibiotics:  3/24: Cont antibiotics:
cont antibiotics:  3/24: Cont antibiotics:  3/26: Cont antibiotics: ID following
cont antibiotics:  3/24: Cont antibiotics:  3/26: Cont antibiotics: ID following  3/27: cont antibitoics:
cont antibiotics:  3/24: Cont antibiotics:  3/26: Cont antibiotics: ID following  3/27: cont antibitoics:
cont antibiotics:  3/24: Cont antibiotics:  3/26: Cont antibiotics: ID following  3/27: cont antibitoics:  3/29: finished antibiotics:
finished antibiotics: afebrile but has some cough with some phlegm: Her paranasal sinuses are clear: on ct scan
finished antibiotics: afebrile but has some cough with some phlegm: Her paranasal sinuses are clear: on ct scan  4/9: stable: off antibiotics:
- s/p ex-lap 12/30 with findings of healthy bowel c/b evisceration of bowel requiring placement of retention sutures  - cw bowel regimen with senna 2 tabs at bedtime, colace 100 mg TID, Miralax 17 grams BID for constipation  - surgery consult called, appreciate their recommendations  - f/u CT a/p
-monitor vanco trough and creatinine  -Continue vanco to 750 mg iv q12
cont antibiotics:  3/24: Cont antibiotics:  3/26: Cont antibiotics: ID following  3/27: cont antibitoics:  3/29: finished antibiotics:  3/30: off antibiotics now:
-monitor vanco trough and creatinine  -last trough elevated -lower vanco to 750 mg iv q12

## 2019-04-09 NOTE — PROGRESS NOTE ADULT - ASSESSMENT
63yof w/ HTN, DM2, COPD/CHRIS on nocturnal CPAP, discharged from [facility] after a prolonged hospitalization for SBO requiring lysis of adhesions, complicated by hypercapneic respiratory failure requiring intubation. Pt awoke with shortness of breath and chest tightness, was supposed to use CPAP last night but nursing facility did not have the machine. Pt oxygen dependent on 4L at baseline. Feeling better now with supplemental oxygen. No fevers or chills. Occasional cough.  originally admitted on 12/15 for SBO s/p ex lap with lysis of adhesions (4 retention sutures) c/b 12/30 she eviscerated 2/2 a coughing episode s/p ex lap (6 retention sutures) c/b multifocal pneumonia, developed hypercapnic respiratory failure requiring intubation 1/6, s/p bronch 1/9/18, and extubated on 1/24, course further c/b Enterococci bacteremia now readmitted to MICU for hypoxic respiratory failure 2/2 atelectasis requiring intubation 01/31. Had grown ESBL in urine, s/p ertapenem.  Extubated to high flow/ BIPAP.

## 2019-04-09 NOTE — PROGRESS NOTE ADULT - PROBLEM SELECTOR PLAN 1
-Completed a 7-day course of antibiotics  -Afebrile  -WBC WNL  -Continue to monitor off abx
- monitor h/h daily, transfuse prn  - s/p 1 unit prbc with good response  - check stool for occult blood  - no overt s/s GIB  - recommend heme evaluation  - cw PPI BID, Carafate 1 gram QID for epigastric pain
doing pretty good with bipap: no resp complaints: no intervention , further, from resp point of view: to cont bipap on dc/::   4/1: doing pretty good: ct head noted: no sinus fluid collection , I see:  4/2: doing better: no need for ct sinuses: Her secretions are much less: no SOB : cont bipap at night: stable from resp point of view!!  4/3: she did have ct sinuses last night: clear paranasal sinuses:  4/4: stable from pulmonary point of view:
- monitor h/h daily, transfuse prn  - no overt s/s GIB  - recommend heme evaluation  - cw PPI BID, Carafate 1 gram QID for epigastric pain
- monitor h/h daily, transfuse prn  - s/p 1 unit prbc with good response  - check stool for occult blood  - no overt s/s GIB  - ppi daily   - recommend heme evaluation  - further recs pending above
- monitor h/h daily, transfuse prn  - s/p 1 unit prbc with good response  - check stool for occult blood  - no overt s/s GIB  - recommend heme evaluation  - start PPI BID, Carafate 1 gram QID for epigastric pain
- monitor h/h daily, transfuse prn  - s/p 1 unit prbc with good response  - check stool for occult blood  - no overt s/s GIB  - recommend heme evaluation  - start PPI BID, Carafate 1 gram QID for epigastric pain
- monitor h/h daily, transfuse prn  - s/p 1 unit prbc with good response  - no overt s/s GIB  - recommend heme evaluation  - cw PPI BID, Carafate 1 gram QID for epigastric pain
-Complete a 7-day course of antibiotics today
-Completed a 7-day course of antibiotics  -Afebrile  -WBC WNL  -Continue to monitor off abx
-cont vanco, meropenem to complete a 7-day course tomorrow 3/17/19.  -f/u bcx
Last VBG noted: change to bipap overnight: Pt was on bipap before: She is alert and awake and currently being treated for HCAP:
continue with bipap at night
continue with bipap at night  3/24: pt seems ot be on cpap although ordered for bipap: TUNDE NP on floor to ensure she shs ion bipap at night: check ABG in AM :
continue with bipap at night  3/25: pt seems ot be on cpap although ordered for bipap: TUNDE NP on floor to ensure she shs ion bipap at night: check ABG in AM :  3/26: pt is doing well from pulm point of view: she is on bipap: Today ABG with improvement in mild hypercarbia:
continue with bipap at night  3/25: pt seems ot be on cpap although ordered for bipap: TUNDE NP on floor to ensure she shs ion bipap at night: check ABG in AM :  3/26: pt is doing well from pulm point of view: she is on bipap: Today ABG with improvement in mild hypercarbia:  3/27: she is complaining of SOB today : prior ABG : chest x-ray today : Cont bipap for now  night time" pt is on prednisone from rheumatology point of view: It was increased to 20 mg yesterday by them:
continue with bipap at night  3/25: pt seems ot be on cpap although ordered for bipap: TUNDE NP on floor to ensure she shs ion bipap at night: check ABG in AM :  3/26: pt is doing well from pulm point of view: she is on bipap: Today ABG with improvement in mild hypercarbia:  3/27: she is complaining of SOB today : prior ABG : chest x-ray today : Cont bipap for now  night time" pt is on prednisone from rheumatology point of view: It was increased to 20 mg yesterday by them:
continue with bipap at night  3/25: pt seems ot be on cpap although ordered for bipap: TUNDE NP on floor to ensure she shs ion bipap at night: check ABG in AM :  3/26: pt is doing well from pulm point of view: she is on bipap: Today ABG with improvement in mild hypercarbia:  3/27: she is complaining of SOB today : prior ABG : chest x-ray today : Cont bipap for now  night time" pt is on prednisone from rheumatology point of view: It was increased to 20 mg yesterday by them:  3/29: she seems ot be doing ok: Complaining of secretions from nasal
doing pretty good with bipap: no resp complaints: no intervention , further, from resp point of view: to cont bipap on dc/::   4/1: doing pretty good: ct head noted: no sinus fluid collection , I see:
doing pretty good with bipap: no resp complaints: no intervention , further, from resp point of view: to cont bipap on dc/::   4/1: doing pretty good: ct head noted: no sinus fluid collection , I see:  4/2: doing better: no need for ct sinuses: Her secretions are much less: no SOB : cont bipap at night: stable from resp point of view!!
doing pretty good with bipap: no resp complaints: no intervention , further, from resp point of view: to cont bipap on dc/::   4/1: doing pretty good: ct head noted: no sinus fluid collection , I see:  4/2: doing better: no need for ct sinuses: Her secretions are much less: no SOB : cont bipap at night: stable from resp point of view!!  4/3: she did have ct sinuses last night: clear paranasal sinuses:
doing pretty good with bipap: no resp complaints: no intervention , further, from resp point of view: to cont bipap on dc/::   4/1: doing pretty good: ct head noted: no sinus fluid collection , I see:  4/2: doing better: no need for ct sinuses: Her secretions are much less: no SOB : cont bipap at night: stable from resp point of view!!  4/3: she did have ct sinuses last night: clear paranasal sinuses:  4/4: stable from pulmonary point of view:  4/5: cont bi[pap at night: She has not been wheezing:
doing pretty good with bipap: no resp complaints: no intervention , further, from resp point of view: to cont bipap on dc/::   : doing pretty good: ct head noted: no sinus fluid collection , I see:  : doing better: no need for ct sinuses: Her secretions are much less: no SOB : cont bipap at night: stable from resp point of view!!  4/3: she did have ct sinuses last night: clear paranasal sinuses:  : stable from pulmonary point of view:  : cont bi[pap at night: She has not been wheezin/6 continue bipap@ night. pt tolerates well with current setting
doing pretty good with bipap: no resp complaints: no intervention , further, from resp point of view: to cont bipap on dc/::   : doing pretty good: ct head noted: no sinus fluid collection , I see:  : doing better: no need for ct sinuses: Her secretions are much less: no SOB : cont bipap at night: stable from resp point of view!!  4/3: she did have ct sinuses last night: clear paranasal sinuses:  : stable from pulmonary point of view:  : cont bi[pap at night: She has not been wheezin/6 continue bipap@ night. pt tolerates well with current setting   continue bipap@ hs and as needed
doing pretty good with bipap: no resp complaints: no intervention , further, from resp point of view: to cont bipap on dc:
pt seems to be doing ok: no SOB : no cough : Cont bipap at night!
pt seems to be doing ok: no SOB : no cough : Cont bipap at night!  4/9: She seems to be doing ok : Says her breathing is OK : no SOB : is on revatio for pulm htn: Last echo with mild pulm htn:
- monitor h/h daily, transfuse prn  - s/p 1 unit prbc with good response  - no overt s/s GIB  - recommend heme evaluation  - cw PPI BID, Carafate 1 gram QID for epigastric pain
-cont vanco, meropenem  -f/u bcx  -Check urine legionella ag
continue with bipap at night  3/25: pt seems ot be on cpap although ordered for bipap: TUNDE NP on floor to ensure she shs ion bipap at night: check ABG in AM :  3/26: pt is doing well from pulm point of view: she is on bipap: Today ABG with improvement in mild hypercarbia:  3/27: she is complaining of SOB today : prior ABG : chest x-ray today : Cont bipap for now  night time" pt is on prednisone from rheumatology point of view: It was increased to 20 mg yesterday by them:  3/29: she seems ot be doing ok: Complaining of secretions from nasal  3/30: she seems to be stable from resp point of view: cont bipap : no need for any ct at this time Will have follow up ct as an outpatient to rule out underlying ILD:
-cont vanco, meropenem  -f/u bcx

## 2019-04-09 NOTE — PROGRESS NOTE ADULT - PROBLEM SELECTOR PLAN 4
Advanced care planning was discussed with patient and family.  Advanced care planning forms were reviewed and discussed.  Risks, benefits and alternatives of gastroenterologic procedures were discussed in detail and all questions were answered.    30 minutes spent.
cont oxygen
seems ot be doing pretty well: no wheezing: Cont bi pap at night:  4/1: cont bipap at night:  4/2: no wheezing: cont BD as wellas bipap at night time~!  4/3: Stable: no wheezing  4/4: Stable!
seems ot be doing pretty well: no wheezing: Cont bi pap at night:  4/1: cont bipap at night:  4/2: no wheezing: cont BD as wellas bipap at night time~!  4/3: Stable: no wheezing  4/4: Stable!  4/5: no wheezing: cont bipap at night
seems ot be doing pretty well: no wheezing: Cont bi pap at night:  4/1: cont bipap at night:  4/2: no wheezing: cont BD as wellas bipap at night time~!  4/3: Stable: no wheezing  4/4: Stable!  4/5: no wheezing: cont bipap at night  4/6 stable. keep O2 sat greater than 88%. bipap@ hs
seems ot be doing pretty well: no wheezing: Cont bi pap at night:  4/1: cont bipap at night:  4/2: no wheezing: cont BD as wellas bipap at night time~!  4/3: Stable: no wheezing  4/4: Stable!  4/5: no wheezing: cont bipap at night  4/6 stable. keep O2 sat greater than 88%. bipap@ hs  4/7 stable. no wheezing. no SOB. continue current meds
she has been controlled: no wheezing: cont BD!
she has been controlled: no wheezing: cont BD!  4/9: controlled: she has not been wheezing: cont BD: and oxygen'!
cont oxygen

## 2019-04-09 NOTE — PROGRESS NOTE ADULT - SUBJECTIVE AND OBJECTIVE BOX
pt seen and examined,  no chest pain overnight     ALBUTerol/ipratropium for Nebulization 3 milliLiter(s) Nebulizer every 6 hours  buDESOnide  80 MICROgram(s)/formoterol 4.5 MICROgram(s) Inhaler 2 Puff(s) Inhalation two times a day  dextrose 40% Gel 15 Gram(s) Oral once PRN  dextrose 5%. 1000 milliLiter(s) IV Continuous <Continuous>  dextrose 50% Injectable 12.5 Gram(s) IV Push once  dextrose 50% Injectable 25 Gram(s) IV Push once  dextrose 50% Injectable 25 Gram(s) IV Push once  docusate sodium 100 milliGRAM(s) Oral three times a day  enoxaparin Injectable 40 milliGRAM(s) SubCutaneous every 24 hours  fentaNYL   Patch  25 MICROgram(s)/Hr 1 Patch Transdermal every 72 hours  furosemide    Tablet 40 milliGRAM(s) Oral two times a day  gabapentin 400 milliGRAM(s) Oral three times a day  glucagon  Injectable 1 milliGRAM(s) IntraMuscular once PRN  guaiFENesin   Syrup  (Sugar-Free) 200 milliGRAM(s) Oral every 6 hours  influenza   Vaccine 0.5 milliLiter(s) IntraMuscular once  insulin glargine Injectable (LANTUS) 20 Unit(s) SubCutaneous at bedtime  insulin lispro (HumaLOG) corrective regimen sliding scale   SubCutaneous three times a day before meals  insulin lispro Injectable (HumaLOG) 10 Unit(s) SubCutaneous three times a day before meals  levothyroxine 137 MICROGram(s) Oral daily  naproxen 500 milliGRAM(s) Oral two times a day  nystatin Powder 1 Application(s) Topical two times a day  ondansetron Injectable 4 milliGRAM(s) IV Push every 6 hours PRN  oxyCODONE    IR 10 milliGRAM(s) Oral every 4 hours PRN  pantoprazole    Tablet 40 milliGRAM(s) Oral two times a day  polyethylene glycol 3350 17 Gram(s) Oral two times a day  predniSONE   Tablet 10 milliGRAM(s) Oral daily  primidone 50 milliGRAM(s) Oral daily  senna 2 Tablet(s) Oral at bedtime  sildenafil (REVATIO) 20 milliGRAM(s) Oral three times a day  simvastatin 20 milliGRAM(s) Oral at bedtime  sodium chloride 0.65% Nasal 1 Spray(s) Both Nostrils every 4 hours PRN  sucralfate 1 Gram(s) Oral four times a day  traMADol 50 milliGRAM(s) Oral two times a day                            9.9    13.16 )-----------( 428      ( 07 Apr 2019 16:30 )             32.9       Hemoglobin: 9.9 g/dL (04-07 @ 16:30)  Hemoglobin: 10.4 g/dL (04-05 @ 09:01)            Creatinine Trend: 1.03<--, 0.74<--, 0.71<--, 0.76<--, 0.86<--, 0.97<--    COAGS:           T(C): 36.9 (04-09-19 @ 00:06), Max: 36.9 (04-08-19 @ 07:21)  HR: 90 (04-09-19 @ 00:58) (89 - 97)  BP: 130/79 (04-09-19 @ 00:06) (106/75 - 130/79)  RR: 18 (04-09-19 @ 00:06) (18 - 20)  SpO2: 99% (04-09-19 @ 00:58) (98% - 100%)  Wt(kg): --    I&O's Summary    07 Apr 2019 07:01  -  08 Apr 2019 07:00  --------------------------------------------------------  IN: 1370 mL / OUT: 400 mL / NET: 970 mL    08 Apr 2019 07:01  -  09 Apr 2019 06:23  --------------------------------------------------------  IN: 860 mL / OUT: 100 mL / NET: 760 mL            Gen: Appears well in NAD  HEENT:  (-)icterus (-)pallor  CV: N S1 S2 1/6 OJ (+)2 Pulses B/l  Resp:  Clear to ausculatation B/L, normal effort  GI: (+) BS Soft, NT, ND  Lymph:  (-)Edema, (-)obvious lymphadenopathy  Skin: Warm to touch, Normal turgor  Psych: Appropriate mood and affect      ECG:  	sinus Tachycardia 109 BPM, nonspecific T wave abnormality    RADIOLOGY:         CXR:  < from: Xray Chest 1 View- PORTABLE-Urgent (03.21.19 @ 11:18) >  The heart is enlarged. Improvingopacities in both lungs when compared to   previous study done on March 6, 2019. Those changes are compatible with   interstitial fluid however an infectious process cannot be ruled out   entirely. Severe degenerative changes of the right shoulder. Degenerative   changes of the thoracic spine is evident as well.    < end of copied text >      ECHO: pending < from: Transthoracic Echocardiogram (03.26.19 @ 10:29) >  Conclusions:  1. Increased relative wall thickness with normal left  ventricular mass index, consistent with concentric left  ventricular remodeling.  2. Hyperdynamic left ventricular systolic function.  3. Estimated pulmonary artery systolic pressure equals 42  mm Hg, assuming right atrial pressureequals 8 mm Hg,  consistent with mild pulmonary pressures.    < end of copied text >      ASSESSMENT/PLAN: 	63y Female  HTN, DM2, COPD/CHRIS on nocturnal CPAP, normal LV function un echo 1/19 moderate pulmonary HTN discharged from [facility] after a prolonged hospitalization for SBO requiring lysis of adhesions, complicated by hypercapneic respiratory failure requiring intubation now being treated for HCAP..    -  GI / DVT prophylaxis,  keep K>4, mag >2.0   -  cont All meds for pulm htn   - monitor off ABX   - Pain management   -  cont lasix po , monitor creatine  - 3/26 ECHO ,   hyperdynamic LV fx   - no further CV work up needed at present  D/W Dr Harris

## 2019-04-09 NOTE — PROGRESS NOTE ADULT - PROBLEM SELECTOR PROBLEM 2
HAP (hospital-acquired pneumonia)
SBO (small bowel obstruction)
HAP (hospital-acquired pneumonia)
Medication monitoring encounter
SBO (small bowel obstruction)
Medication monitoring encounter
SBO (small bowel obstruction)
HAP (hospital-acquired pneumonia)
Medication monitoring encounter

## 2019-04-09 NOTE — PROGRESS NOTE ADULT - PROVIDER SPECIALTY LIST ADULT
Cardiology
Gastroenterology
Hospitalist
Infectious Disease
Neurology
Neurosurgery
Pulmonology
Rheumatology
Surgery
Wound Care
Cardiology
Gastroenterology
Hospitalist
Surgery
Infectious Disease
Pulmonology
Pulmonology
Gastroenterology

## 2019-04-09 NOTE — PROGRESS NOTE ADULT - PROBLEM SELECTOR PROBLEM 3
HAP (hospital-acquired pneumonia)
Chronic obstructive pulmonary disease (COPD)
HAP (hospital-acquired pneumonia)
Hernia
HAP (hospital-acquired pneumonia)
Chronic obstructive pulmonary disease (COPD)

## 2019-04-09 NOTE — PROGRESS NOTE ADULT - SUBJECTIVE AND OBJECTIVE BOX
Patient is a 63y old  Female who presents with a chief complaint of sob (09 Apr 2019 06:22)      Any change in ROS: Seems to be doing ok : no SOB : no cough :  she says she has pain all over: she denies having SOB or cough :       MEDICATIONS  (STANDING):  ALBUTerol/ipratropium for Nebulization 3 milliLiter(s) Nebulizer every 6 hours  buDESOnide  80 MICROgram(s)/formoterol 4.5 MICROgram(s) Inhaler 2 Puff(s) Inhalation two times a day  dextrose 5%. 1000 milliLiter(s) (50 mL/Hr) IV Continuous <Continuous>  dextrose 50% Injectable 12.5 Gram(s) IV Push once  dextrose 50% Injectable 25 Gram(s) IV Push once  dextrose 50% Injectable 25 Gram(s) IV Push once  docusate sodium 100 milliGRAM(s) Oral three times a day  enoxaparin Injectable 40 milliGRAM(s) SubCutaneous every 24 hours  fentaNYL   Patch  25 MICROgram(s)/Hr 1 Patch Transdermal every 72 hours  furosemide    Tablet 40 milliGRAM(s) Oral two times a day  gabapentin 400 milliGRAM(s) Oral three times a day  guaiFENesin   Syrup  (Sugar-Free) 200 milliGRAM(s) Oral every 6 hours  influenza   Vaccine 0.5 milliLiter(s) IntraMuscular once  insulin glargine Injectable (LANTUS) 20 Unit(s) SubCutaneous at bedtime  insulin lispro (HumaLOG) corrective regimen sliding scale   SubCutaneous three times a day before meals  insulin lispro Injectable (HumaLOG) 10 Unit(s) SubCutaneous three times a day before meals  levothyroxine 137 MICROGram(s) Oral daily  naproxen 500 milliGRAM(s) Oral two times a day  nystatin Powder 1 Application(s) Topical two times a day  pantoprazole    Tablet 40 milliGRAM(s) Oral two times a day  polyethylene glycol 3350 17 Gram(s) Oral two times a day  predniSONE   Tablet 10 milliGRAM(s) Oral daily  primidone 50 milliGRAM(s) Oral daily  senna 2 Tablet(s) Oral at bedtime  sildenafil (REVATIO) 20 milliGRAM(s) Oral three times a day  simvastatin 20 milliGRAM(s) Oral at bedtime  sucralfate 1 Gram(s) Oral four times a day  traMADol 50 milliGRAM(s) Oral two times a day    MEDICATIONS  (PRN):  dextrose 40% Gel 15 Gram(s) Oral once PRN Blood Glucose LESS THAN 70 milliGRAM(s)/deciliter  glucagon  Injectable 1 milliGRAM(s) IntraMuscular once PRN Glucose LESS THAN 70 milligrams/deciliter  ondansetron Injectable 4 milliGRAM(s) IV Push every 6 hours PRN Nausea  oxyCODONE    IR 10 milliGRAM(s) Oral every 4 hours PRN Moderate Pain (4 - 6)  sodium chloride 0.65% Nasal 1 Spray(s) Both Nostrils every 4 hours PRN Nasal Congestion    Vital Signs Last 24 Hrs  T(C): 36.7 (09 Apr 2019 08:00), Max: 36.9 (09 Apr 2019 00:06)  T(F): 98 (09 Apr 2019 08:00), Max: 98.4 (09 Apr 2019 00:06)  HR: 87 (09 Apr 2019 08:00) (87 - 97)  BP: 105/70 (09 Apr 2019 08:00) (105/70 - 130/79)  BP(mean): --  RR: 18 (09 Apr 2019 08:00) (18 - 20)  SpO2: 100% (09 Apr 2019 08:00) (98% - 100%)    I&O's Summary    08 Apr 2019 07:01  -  09 Apr 2019 07:00  --------------------------------------------------------  IN: 860 mL / OUT: 100 mL / NET: 760 mL          Physical Exam:   GENERAL: NAD, well-groomed, well-developed  HEENT: KWAME/   Atraumatic, Normocephalic  ENMT: No tonsillar erythema, exudates, or enlargement; Moist mucous membranes, Good dentition, No lesions  NECK: Supple, No JVD, Normal thyroid  CHEST/LUNG: Clear to auscultaion  CVS: Regular rate and rhythm; No murmurs, rubs, or gallops  GI: : Soft, Nontender, Nondistended; Bowel sounds present  NERVOUS SYSTEM:  Alert & Oriented X3  EXTREMITIES: - edema  LYMPH: No lymphadenopathy noted  SKIN: No rashes or lesions  ENDOCRINOLOGY: No Thyromegaly  PSYCH: Appropriate    Labs:                              9.9    13.16 )-----------( 428      ( 07 Apr 2019 16:30 )             32.9                         10.4   12.4  )-----------( 400      ( 05 Apr 2019 09:01 )             32.0     04-05    139  |  100  |  35<H>  ----------------------------<  147<H>  4.0   |  24  |  1.03        CAPILLARY BLOOD GLUCOSE      POCT Blood Glucose.: 178 mg/dL (09 Apr 2019 08:23)  POCT Blood Glucose.: 116 mg/dL (08 Apr 2019 21:55)  POCT Blood Glucose.: 175 mg/dL (08 Apr 2019 16:54)  POCT Blood Glucose.: 203 mg/dL (08 Apr 2019 12:18)  POCT Blood Glucose.: 152 mg/dL (08 Apr 2019 08:40)      < from: MR Lumbar Spine No Cont (04.07.19 @ 23:23) >  sequence. Axial T1 and T2-weighted sequences were performed as well.    This exam is compared with prior MRI lumbar spine performed on March 6, 2015.    Loss of normal lumbar lordosis is seen which could be due to positioning   or muscle spasm.    The vertebral body height alignment and marrow signal appear normal    Mild scoliosis is seen.    L1-2: Normal    L2-3: Normal    L3-4: Normal    L4-5: Normal    L5-S1: Epidural lipomatosis is again identified. Severe narrowing of the   spinal canal and thecal sac.    Evaluation of the paraspinal soft tissues appear normal.    Small renal cyst is seen on left side.    Impression: No significant change when allowing for differences in   technique.                    LAYO YU M.D., ATTENDING RADIOLOGIST  This document has been electronically signed. Apr 8 2019  9:34AM        < end of copied text >      < from: Transthoracic Echocardiogram (03.26.19 @ 10:29) >  EF ([place]): 78 %  ------------------------------------------------------------------------  Observations:  Mitral Valve: Mitral annular calcification, otherwise  normal mitral valve.  Aortic Valve/Aorta: Normal trileaflet aortic valve.  Aortic Root: 2.9 cm.  LVOT diameter: 2.1 cm.  Left Atrium: Normal left atrium.  LA volume index = 20  cc/m2.  Left Ventricle:Hyperdynamic left ventricular systolic  function. Increased relative wall thickness with normal  left ventricular mass index, consistent with concentric  left ventricular remodeling.  Right Heart: Normal right atrium. The right ventricle is  not wellvisualized. Normal tricuspid valve. Mild tricuspid  regurgitation. Normal pulmonic valve.  Pericardium/Pleura: Normal pericardium with no pericardial  effusion.  Hemodynamic: Estimated right atrial pressure is 8 mm Hg.  Estimated right ventricular systolic pressure equals 42 mm  Hg, assuming right atrial pressure equals 8 mm Hg,  consistent with mild pulmonary hypertension.  ------------------------------------------------------------------------  Conclusions:  1. Increased relative wall thickness with normal left  ventricular mass index, consistent with concentric left  ventricular remodeling.  2. Hyperdynamic left ventricular systolic function.  3. Estimated pulmonary artery systolic pressure equals 42  mm Hg, assuming right atrial pressureequals 8 mm Hg,  consistent with mild pulmonary pressures.  ------------------------------------------------------------------------  Confirmed on  3/26/2019 - 13:01:19 by Alex Ta M.D.  ------------------------------------------------------------------------    < end of copied text >    < from: Transthoracic Echocardiogram (01.13.19 @ 12:27) >  RWT: 0.42  Fractional short: 38 %  Ejection Fraction (Teicholtz): 69 %  ------------------------------------------------------------------------  OBSERVATIONS:  Mitral Valve: Mitral annular calcification, otherwise  normal mitral valve. Minimal mitral regurgitation.  Aortic Root: Normal aortic root.  Aortic Valve: Aortic valve leaflet morphology not well  visualized.  Left Atrium: Normal left atrium.  Left Ventricle: Endocardium not well visualized; grossly  normal left ventricular systolic function. Normal left  ventricular internal dimensions and wall thicknesses.  Right Heart: Normal right atrium. Unable to accurately  evaluate right ventricular size or systolic function.  Normal tricuspid valve.  Mild tricuspid regurgitation.  Normal pulmonic valve. Minimal pulmonic regurgitation.  Pericardium/PleuraNormal pericardium with no pericardial  effusion.  Hemodynamic: Estimated right ventricular systolic pressure  equals 51 mm Hg, assuming right atrial pressure equals 10  mm Hg, consistent with moderate pulmonary hypertension.  ------------------------------------------------------------------------  CONCLUSIONS:  1. Mitral annular calcification, otherwise normal mitral  valve. Minimal mitral regurgitation.  2. Normal left ventricular internal dimensions and wall  thicknesses.  3. Endocardium not well visualized; grossly normal left  ventricular systolic function.  4. Unable to accurately evaluate right ventricular size or  systolic function.  5. Estimated right ventricular systolic pressure equals 51  mm Hg, assuming right atrial pressure equals 10mm Hg,  consistent with moderate pulmonary hypertension.  Unable to exclude endocarditis.  Consider MATEO for further  evaluation, if clinically indicated.  ------------------------------------------------------------------------  Confirmed on  1/13/2019 - 13:37:12 by Frankie Miller, M.D.  ------------------------------------------------------------------------    < end of copied text >        RECENT CULTURES:        RESPIRATORY CULTURES:          Studies  Chest X-RAY  CT SCAN Chest   Venous Dopplers: LE:   CT Abdomen  Others

## 2019-04-09 NOTE — CHART NOTE - NSCHARTNOTEFT_GEN_A_CORE
Patient is medically cleared for discharge to [facility] by DR. Novoa and interdisciplinary team members. Discharge plan explaned to patient and son at length

## 2019-04-09 NOTE — PROGRESS NOTE ADULT - SUBJECTIVE AND OBJECTIVE BOX
INTERVAL HPI/OVERNIGHT EVENTS:    pt s/e. she denies abdominal pain, n/v. Tolerating PO well.  no new GI events/complaints  + brown bm yesterday    MEDICATIONS  (STANDING):  ALBUTerol/ipratropium for Nebulization 3 milliLiter(s) Nebulizer every 6 hours  buDESOnide  80 MICROgram(s)/formoterol 4.5 MICROgram(s) Inhaler 2 Puff(s) Inhalation two times a day  dextrose 5%. 1000 milliLiter(s) (50 mL/Hr) IV Continuous <Continuous>  dextrose 50% Injectable 12.5 Gram(s) IV Push once  dextrose 50% Injectable 25 Gram(s) IV Push once  dextrose 50% Injectable 25 Gram(s) IV Push once  docusate sodium 100 milliGRAM(s) Oral three times a day  enoxaparin Injectable 40 milliGRAM(s) SubCutaneous every 24 hours  fentaNYL   Patch  25 MICROgram(s)/Hr 1 Patch Transdermal every 72 hours  furosemide    Tablet 40 milliGRAM(s) Oral two times a day  gabapentin 400 milliGRAM(s) Oral three times a day  guaiFENesin   Syrup  (Sugar-Free) 200 milliGRAM(s) Oral every 6 hours  influenza   Vaccine 0.5 milliLiter(s) IntraMuscular once  insulin glargine Injectable (LANTUS) 20 Unit(s) SubCutaneous at bedtime  insulin lispro (HumaLOG) corrective regimen sliding scale   SubCutaneous three times a day before meals  insulin lispro Injectable (HumaLOG) 10 Unit(s) SubCutaneous three times a day before meals  levothyroxine 137 MICROGram(s) Oral daily  naproxen 500 milliGRAM(s) Oral two times a day  nystatin Powder 1 Application(s) Topical two times a day  pantoprazole    Tablet 40 milliGRAM(s) Oral two times a day  polyethylene glycol 3350 17 Gram(s) Oral two times a day  predniSONE   Tablet 10 milliGRAM(s) Oral daily  primidone 50 milliGRAM(s) Oral daily  senna 2 Tablet(s) Oral at bedtime  sildenafil (REVATIO) 20 milliGRAM(s) Oral three times a day  simvastatin 20 milliGRAM(s) Oral at bedtime  sucralfate 1 Gram(s) Oral four times a day  traMADol 50 milliGRAM(s) Oral two times a day    MEDICATIONS  (PRN):  dextrose 40% Gel 15 Gram(s) Oral once PRN Blood Glucose LESS THAN 70 milliGRAM(s)/deciliter  glucagon  Injectable 1 milliGRAM(s) IntraMuscular once PRN Glucose LESS THAN 70 milligrams/deciliter  ondansetron Injectable 4 milliGRAM(s) IV Push every 6 hours PRN Nausea  oxyCODONE    IR 10 milliGRAM(s) Oral every 4 hours PRN Moderate Pain (4 - 6)  sodium chloride 0.65% Nasal 1 Spray(s) Both Nostrils every 4 hours PRN Nasal Congestion      Allergies    No Known Allergies    Intolerances    Seroquel (Other)      Review of Systems:    General:  No wt loss, fevers, chills, night sweats,fatigue,   Eyes:  Good vision, no reported pain  ENT:  No sore throat, pain, runny nose, dysphagia  CV:  No pain, palpitations, hypo/hypertension  Resp:  No dyspnea, cough, tachypnea, wheezing  GI:  No pain, No nausea, No vomiting, No diarrhea, No constipation, No weight loss, No fever, No pruritis, No rectal bleeding, No melena, No dysphagia  :  No pain, bleeding, incontinence, nocturia  Muscle:  No pain, weakness  Neuro:  No weakness, tingling, memory problems  Psych:  No fatigue, insomnia, mood problems, depression  Endocrine:  No polyuria, polydypsia, cold/heat intolerance  Heme:  No petechiae, ecchymosis, easy bruisability  Skin:  No rash, tattoos, scars, edema      Vital Signs Last 24 Hrs  T(C): 36.7 (09 Apr 2019 08:00), Max: 36.9 (09 Apr 2019 00:06)  T(F): 98 (09 Apr 2019 08:00), Max: 98.4 (09 Apr 2019 00:06)  HR: 100 (09 Apr 2019 09:17) (87 - 100)  BP: 105/70 (09 Apr 2019 08:00) (105/70 - 130/79)  BP(mean): --  RR: 18 (09 Apr 2019 08:00) (18 - 20)  SpO2: 92% (09 Apr 2019 09:17) (92% - 100%)    PHYSICAL EXAM:    Constitutional: NAD, well-developed  HEENT: EOMI, throat clear  Neck: No LAD, supple  Respiratory: CTA and P  Cardiovascular: S1 and S2, RRR, no M  Gastrointestinal: soft, NTND, midline incision c/d/i, no rebound or guarding  Extremities: No peripheral edema, neg clubing, cyanosis  Vascular: 2+ peripheral pulses  Neurological: A/O x 3, no focal deficits  Psychiatric: Normal mood, normal affect  Skin: No rashes        LABS:                        9.9    13.16 )-----------( 428      ( 07 Apr 2019 16:30 )             32.9                 RADIOLOGY & ADDITIONAL TESTS:

## 2019-04-25 ENCOUNTER — APPOINTMENT (OUTPATIENT)
Dept: NEUROSURGERY | Facility: CLINIC | Age: 64
End: 2019-04-25

## 2019-06-11 ENCOUNTER — APPOINTMENT (OUTPATIENT)
Dept: ENDOCRINOLOGY | Facility: CLINIC | Age: 64
End: 2019-06-11

## 2019-06-18 NOTE — PROGRESS NOTE ADULT - I WAS PHYSICALLY PRESENT FOR THE KEY PORTIONS OF THE EVALUATION AND MANAGEMENT (E/M) SERVICE PROVIDED.  I AGREE WITH THE ABOVE HISTORY, PHYSICAL, AND PLAN WHICH I HAVE REVIEWED AND EDITED WHERE APPROPRIATE
Statement Selected
Abdominal Pain, N/V/D
Statement Selected

## 2019-08-30 ENCOUNTER — APPOINTMENT (OUTPATIENT)
Dept: ENDOCRINOLOGY | Facility: CLINIC | Age: 64
End: 2019-08-30

## 2019-09-28 NOTE — CONSULT NOTE ADULT - ASSESSMENT
A/P:    63yof w/ HTN, DM2, COPD/CHRIS on nocturnal CPAP, discharged yesterday from [facility] after a prolonged hospitalization for SBO requiring lysis of adhesions, complicated by hypercapneic respiratory failure requiring intubation pt presented to ED w/ shortness of breath and chest tightness    Consider General Surgery as pt had surgery at [facility] in 12/2018  Abdominal Superficial wound- ADAPTIC  Abdominal Binder to be worn at all times  BLE elevation  Abx per Medicine/ ID  Moisturize intact skin w/ SWEEN cream BID  con't Nutrition (as tolerated), Nutrition Consult  con't Offloading   con't Pericare  Care as per medicine will follow w/ you  Upon [facility] [address] [place] [address] [phone]  Seen w/ attng and D/w team  Thank you for this consult  Mary Russ PA-C [id number] - Likely secondary to Tacrolimus  - Labetalol 40 mg PO BID- will hold for BP <95/40  - Furosemide 11 mg IV q6  - Spironolactone 10 mg PO q12  - Amlodipine 2.5 mg PO BID  - Nifedipine 1 mg PO q4 PRN for BP > 115/70

## 2019-10-14 ENCOUNTER — APPOINTMENT (OUTPATIENT)
Dept: ENDOCRINOLOGY | Facility: CLINIC | Age: 64
End: 2019-10-14

## 2019-10-19 NOTE — PROCEDURE NOTE - NSCATHTYPE_GEN_A_CORE
CVC
You can access the FollowMyHealth Patient Portal offered by [facility] by registering at the following website: [email or web address]. By joining [facility]’s FollowMyHealth portal, you will also be able to view your health information using other applications (apps) compatible with our system.

## 2019-11-19 NOTE — PATIENT PROFILE ADULT - FUNCTIONAL SCREEN CURRENT LEVEL: SWALLOWING (IF SCORE 2 OR MORE FOR ANY ITEM, CONSULT REHAB SERVICES), MLM)
Patient called with complaints of  \"horrible night sweats\" , waking 2-3 time per night to change clothes, and increasing hot flashes.  Patient states she has stopped HRT 3 months ago.  Patient states she is fine with restarting HRT, if ok with Dr. Adams.  Advised patient will contact Dr. Adams to advise and get back to her.  Patient notified.   Patient voiced understanding  of information given and denies any questions.   msg routed to Dr. Adams to advise.    0 = swallows foods/liquids without difficulty

## 2020-01-01 RX ADMIN — TRAMADOL HYDROCHLORIDE 100 MILLIGRAM(S): 50 TABLET ORAL at 20:06

## 2020-01-12 ENCOUNTER — INPATIENT (INPATIENT)
Facility: HOSPITAL | Age: 65
LOS: 0 days | Discharge: LEFT BEFORE DISCHARGE | End: 2020-01-13
Attending: INTERNAL MEDICINE | Admitting: INTERNAL MEDICINE
Payer: COMMERCIAL

## 2020-01-12 VITALS — WEIGHT: 250 LBS | HEIGHT: 64 IN | RESPIRATION RATE: 20 BRPM

## 2020-01-12 DIAGNOSIS — E11.9 TYPE 2 DIABETES MELLITUS WITHOUT COMPLICATIONS: ICD-10-CM

## 2020-01-12 DIAGNOSIS — A41.9 SEPSIS, UNSPECIFIED ORGANISM: ICD-10-CM

## 2020-01-12 DIAGNOSIS — E03.9 HYPOTHYROIDISM, UNSPECIFIED: ICD-10-CM

## 2020-01-12 DIAGNOSIS — J45.909 UNSPECIFIED ASTHMA, UNCOMPLICATED: ICD-10-CM

## 2020-01-12 DIAGNOSIS — D72.829 ELEVATED WHITE BLOOD CELL COUNT, UNSPECIFIED: ICD-10-CM

## 2020-01-12 DIAGNOSIS — M06.9 RHEUMATOID ARTHRITIS, UNSPECIFIED: ICD-10-CM

## 2020-01-12 LAB
ALBUMIN SERPL ELPH-MCNC: 3.3 G/DL — SIGNIFICANT CHANGE UP (ref 3.3–5)
ALP SERPL-CCNC: 120 U/L — SIGNIFICANT CHANGE UP (ref 40–120)
ALT FLD-CCNC: 51 U/L — SIGNIFICANT CHANGE UP (ref 12–78)
ANION GAP SERPL CALC-SCNC: 15 MMOL/L — SIGNIFICANT CHANGE UP (ref 5–17)
ANISOCYTOSIS BLD QL: SLIGHT — SIGNIFICANT CHANGE UP
APAP SERPL-MCNC: < 2 UG/ML (ref 10–30)
APTT BLD: 25.6 SEC — LOW (ref 28.5–37)
AST SERPL-CCNC: 43 U/L — HIGH (ref 15–37)
BASOPHILS # BLD AUTO: 0 K/UL — SIGNIFICANT CHANGE UP (ref 0–0.2)
BASOPHILS NFR BLD AUTO: 0 % — SIGNIFICANT CHANGE UP (ref 0–2)
BILIRUB SERPL-MCNC: 0.4 MG/DL — SIGNIFICANT CHANGE UP (ref 0.2–1.2)
BUN SERPL-MCNC: 39 MG/DL — HIGH (ref 7–23)
CALCIUM SERPL-MCNC: 9.2 MG/DL — SIGNIFICANT CHANGE UP (ref 8.5–10.1)
CHLORIDE SERPL-SCNC: 101 MMOL/L — SIGNIFICANT CHANGE UP (ref 96–108)
CK SERPL-CCNC: 103 U/L — SIGNIFICANT CHANGE UP (ref 26–192)
CO2 SERPL-SCNC: 21 MMOL/L — LOW (ref 22–31)
CREAT SERPL-MCNC: 1.61 MG/DL — HIGH (ref 0.5–1.3)
EOSINOPHIL # BLD AUTO: 0.68 K/UL — HIGH (ref 0–0.5)
EOSINOPHIL NFR BLD AUTO: 4 % — SIGNIFICANT CHANGE UP (ref 0–6)
ETHANOL SERPL-MCNC: <10 MG/DL — SIGNIFICANT CHANGE UP (ref 0–10)
GLUCOSE BLDC GLUCOMTR-MCNC: 229 MG/DL — HIGH (ref 70–99)
GLUCOSE SERPL-MCNC: 252 MG/DL — HIGH (ref 70–99)
HCT VFR BLD CALC: 38.2 % — SIGNIFICANT CHANGE UP (ref 34.5–45)
HGB BLD-MCNC: 11.9 G/DL — SIGNIFICANT CHANGE UP (ref 11.5–15.5)
INR BLD: 1.03 RATIO — SIGNIFICANT CHANGE UP (ref 0.88–1.16)
LACTATE SERPL-SCNC: 3.7 MMOL/L — HIGH (ref 0.7–2)
LYMPHOCYTES # BLD AUTO: 46 % — HIGH (ref 13–44)
LYMPHOCYTES # BLD AUTO: 7.77 K/UL — HIGH (ref 1–3.3)
MANUAL SMEAR VERIFICATION: SIGNIFICANT CHANGE UP
MCHC RBC-ENTMCNC: 30.3 PG — SIGNIFICANT CHANGE UP (ref 27–34)
MCHC RBC-ENTMCNC: 31.2 GM/DL — LOW (ref 32–36)
MCV RBC AUTO: 97.2 FL — SIGNIFICANT CHANGE UP (ref 80–100)
MONOCYTES # BLD AUTO: 1.69 K/UL — HIGH (ref 0–0.9)
MONOCYTES NFR BLD AUTO: 10 % — SIGNIFICANT CHANGE UP (ref 2–14)
NEUTROPHILS # BLD AUTO: 6.59 K/UL — SIGNIFICANT CHANGE UP (ref 1.8–7.4)
NEUTROPHILS NFR BLD AUTO: 39 % — LOW (ref 43–77)
NRBC # BLD: 0 /100 — SIGNIFICANT CHANGE UP (ref 0–0)
NRBC # BLD: SIGNIFICANT CHANGE UP /100 WBCS (ref 0–0)
PLAT MORPH BLD: NORMAL — SIGNIFICANT CHANGE UP
PLATELET # BLD AUTO: 452 K/UL — HIGH (ref 150–400)
POTASSIUM SERPL-MCNC: 4.6 MMOL/L — SIGNIFICANT CHANGE UP (ref 3.5–5.3)
POTASSIUM SERPL-SCNC: 4.6 MMOL/L — SIGNIFICANT CHANGE UP (ref 3.5–5.3)
PROT SERPL-MCNC: 8.1 GM/DL — SIGNIFICANT CHANGE UP (ref 6–8.3)
PROTHROM AB SERPL-ACNC: 11.6 SEC — SIGNIFICANT CHANGE UP (ref 10–12.9)
RBC # BLD: 3.93 M/UL — SIGNIFICANT CHANGE UP (ref 3.8–5.2)
RBC # FLD: 22.9 % — HIGH (ref 10.3–14.5)
RBC BLD AUTO: ABNORMAL
SODIUM SERPL-SCNC: 137 MMOL/L — SIGNIFICANT CHANGE UP (ref 135–145)
TSH SERPL-MCNC: 29.1 UIU/ML — HIGH (ref 0.36–3.74)
VARIANT LYMPHS # BLD: 1 % — SIGNIFICANT CHANGE UP (ref 0–6)
WBC # BLD: 16.89 K/UL — HIGH (ref 3.8–10.5)
WBC # FLD AUTO: 16.89 K/UL — HIGH (ref 3.8–10.5)

## 2020-01-12 PROCEDURE — 70450 CT HEAD/BRAIN W/O DYE: CPT | Mod: 26

## 2020-01-12 PROCEDURE — 74176 CT ABD & PELVIS W/O CONTRAST: CPT | Mod: 26

## 2020-01-12 PROCEDURE — 99285 EMERGENCY DEPT VISIT HI MDM: CPT

## 2020-01-12 RX ORDER — INSULIN LISPRO 100/ML
VIAL (ML) SUBCUTANEOUS
Refills: 0 | Status: DISCONTINUED | OUTPATIENT
Start: 2020-01-12 | End: 2020-01-13

## 2020-01-12 RX ORDER — SODIUM CHLORIDE 9 MG/ML
1000 INJECTION, SOLUTION INTRAVENOUS
Refills: 0 | Status: DISCONTINUED | OUTPATIENT
Start: 2020-01-12 | End: 2020-01-13

## 2020-01-12 RX ORDER — PANTOPRAZOLE SODIUM 20 MG/1
40 TABLET, DELAYED RELEASE ORAL
Refills: 0 | Status: DISCONTINUED | OUTPATIENT
Start: 2020-01-12 | End: 2020-01-13

## 2020-01-12 RX ORDER — PSYLLIUM SEED (WITH DEXTROSE)
1 POWDER (GRAM) ORAL DAILY
Refills: 0 | Status: DISCONTINUED | OUTPATIENT
Start: 2020-01-12 | End: 2020-01-13

## 2020-01-12 RX ORDER — FUROSEMIDE 40 MG
40 TABLET ORAL
Refills: 0 | Status: DISCONTINUED | OUTPATIENT
Start: 2020-01-12 | End: 2020-01-13

## 2020-01-12 RX ORDER — LEVOTHYROXINE SODIUM 125 MCG
25 TABLET ORAL DAILY
Refills: 0 | Status: DISCONTINUED | OUTPATIENT
Start: 2020-01-12 | End: 2020-01-13

## 2020-01-12 RX ORDER — GABAPENTIN 400 MG/1
400 CAPSULE ORAL
Refills: 0 | Status: DISCONTINUED | OUTPATIENT
Start: 2020-01-12 | End: 2020-01-13

## 2020-01-12 RX ORDER — TIOTROPIUM BROMIDE 18 UG/1
1 CAPSULE ORAL; RESPIRATORY (INHALATION) DAILY
Refills: 0 | Status: DISCONTINUED | OUTPATIENT
Start: 2020-01-12 | End: 2020-01-13

## 2020-01-12 RX ORDER — INSULIN GLARGINE 100 [IU]/ML
20 INJECTION, SOLUTION SUBCUTANEOUS AT BEDTIME
Refills: 0 | Status: DISCONTINUED | OUTPATIENT
Start: 2020-01-12 | End: 2020-01-13

## 2020-01-12 RX ORDER — OXYCODONE HYDROCHLORIDE 5 MG/1
10 TABLET ORAL EVERY 4 HOURS
Refills: 0 | Status: DISCONTINUED | OUTPATIENT
Start: 2020-01-12 | End: 2020-01-13

## 2020-01-12 RX ORDER — DEXTROSE 50 % IN WATER 50 %
15 SYRINGE (ML) INTRAVENOUS ONCE
Refills: 0 | Status: DISCONTINUED | OUTPATIENT
Start: 2020-01-12 | End: 2020-01-13

## 2020-01-12 RX ORDER — GLUCAGON INJECTION, SOLUTION 0.5 MG/.1ML
1 INJECTION, SOLUTION SUBCUTANEOUS ONCE
Refills: 0 | Status: DISCONTINUED | OUTPATIENT
Start: 2020-01-12 | End: 2020-01-13

## 2020-01-12 RX ORDER — SUCRALFATE 1 G
1 TABLET ORAL
Refills: 0 | Status: DISCONTINUED | OUTPATIENT
Start: 2020-01-12 | End: 2020-01-13

## 2020-01-12 RX ORDER — INSULIN LISPRO 100/ML
5 VIAL (ML) SUBCUTANEOUS
Refills: 0 | Status: DISCONTINUED | OUTPATIENT
Start: 2020-01-12 | End: 2020-01-13

## 2020-01-12 RX ORDER — PRIMIDONE 250 MG/1
50 TABLET ORAL DAILY
Refills: 0 | Status: DISCONTINUED | OUTPATIENT
Start: 2020-01-12 | End: 2020-01-13

## 2020-01-12 RX ORDER — SIMVASTATIN 20 MG/1
20 TABLET, FILM COATED ORAL AT BEDTIME
Refills: 0 | Status: DISCONTINUED | OUTPATIENT
Start: 2020-01-12 | End: 2020-01-13

## 2020-01-12 RX ORDER — DEXTROSE 50 % IN WATER 50 %
25 SYRINGE (ML) INTRAVENOUS ONCE
Refills: 0 | Status: DISCONTINUED | OUTPATIENT
Start: 2020-01-12 | End: 2020-01-13

## 2020-01-12 RX ORDER — SENNA PLUS 8.6 MG/1
2 TABLET ORAL AT BEDTIME
Refills: 0 | Status: DISCONTINUED | OUTPATIENT
Start: 2020-01-12 | End: 2020-01-13

## 2020-01-12 RX ORDER — DEXTROSE 50 % IN WATER 50 %
12.5 SYRINGE (ML) INTRAVENOUS ONCE
Refills: 0 | Status: DISCONTINUED | OUTPATIENT
Start: 2020-01-12 | End: 2020-01-13

## 2020-01-12 RX ORDER — SODIUM CHLORIDE 9 MG/ML
1000 INJECTION INTRAMUSCULAR; INTRAVENOUS; SUBCUTANEOUS ONCE
Refills: 0 | Status: COMPLETED | OUTPATIENT
Start: 2020-01-12 | End: 2020-01-12

## 2020-01-12 RX ORDER — ACETAMINOPHEN 500 MG
650 TABLET ORAL EVERY 6 HOURS
Refills: 0 | Status: DISCONTINUED | OUTPATIENT
Start: 2020-01-12 | End: 2020-01-13

## 2020-01-12 RX ORDER — IPRATROPIUM/ALBUTEROL SULFATE 18-103MCG
3 AEROSOL WITH ADAPTER (GRAM) INHALATION EVERY 6 HOURS
Refills: 0 | Status: DISCONTINUED | OUTPATIENT
Start: 2020-01-12 | End: 2020-01-13

## 2020-01-12 RX ORDER — TRAMADOL HYDROCHLORIDE 50 MG/1
50 TABLET ORAL
Refills: 0 | Status: DISCONTINUED | OUTPATIENT
Start: 2020-01-12 | End: 2020-01-13

## 2020-01-12 RX ORDER — KETAMINE HYDROCHLORIDE 100 MG/ML
30 INJECTION INTRAMUSCULAR; INTRAVENOUS ONCE
Refills: 0 | Status: DISCONTINUED | OUTPATIENT
Start: 2020-01-12 | End: 2020-01-12

## 2020-01-12 RX ORDER — CEFTRIAXONE 500 MG/1
1000 INJECTION, POWDER, FOR SOLUTION INTRAMUSCULAR; INTRAVENOUS ONCE
Refills: 0 | Status: COMPLETED | OUTPATIENT
Start: 2020-01-12 | End: 2020-01-12

## 2020-01-12 RX ORDER — ALBUTEROL 90 UG/1
1 AEROSOL, METERED ORAL EVERY 4 HOURS
Refills: 0 | Status: DISCONTINUED | OUTPATIENT
Start: 2020-01-12 | End: 2020-01-13

## 2020-01-12 RX ADMIN — SODIUM CHLORIDE 1000 MILLILITER(S): 9 INJECTION INTRAMUSCULAR; INTRAVENOUS; SUBCUTANEOUS at 19:53
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
## 2020-01-12 NOTE — H&P ADULT - NSHPPHYSICALEXAM_GEN_ALL_CORE
PHYSICAL EXAM:    GENERAL: NAD, well-groomed, well-developed  HEAD:  Atraumatic, Normocephalic  EYES: EOMI, PERRLA, conjunctiva and sclera clear  ENMT: No tonsillar erythema, exudates, or enlargement; Moist mucous membranes, , No lesions  NECK: Supple, No JVD, Normal thyroid  NERVOUS SYSTEM:  Alert & Oriented X4, ; Motor Strength 5/5 B/L upper and lower extremities; DTRs 2+ intact and symmetric  CHEST/LUNG: Clear  bilaterally; No rales, rhonchi, wheezing, or rubs  HEART: Regular rate and rhythm; No murmurs, rubs, or gallops  ABDOMEN: Soft, Nontender, Nondistended; no  masses Bowel sounds present ABDOMINAL HENIA  EXTREMITIES:  + Peripheral Pulses, No clubbing, cyanosis, or edema  LYMPH: No lymphadenopathy noted   RECTAL: deferred    SKIN: No rashes or lesions PHYSICAL EXAM:    GENERAL: NAD, well-groomed, well-developed  HEAD:  Atraumatic, Normocephalic  EYES: EOMI, PERRLA, conjunctiva and sclera clear  ENMT: No tonsillar erythema, exudates, or enlargement; Moist mucous membranes, , No lesions  NECK: Supple, No JVD, Normal thyroid  NERVOUS SYSTEM:  Alert  confused  moves  all  extr  CHEST/LUNG: Clear  bilaterally; No rales, rhonchi, wheezing, or rubs  HEART: Regular rate and rhythm; No murmurs, rubs, or gallops  ABDOMEN: Soft, Nontender, Nondistended; no  masses Bowel sounds present ABDOMINAL HERNIA  EXTREMITIES:  + Peripheral Pulses, No clubbing, cyanosis, or edema  LYMPH: No lymphadenopathy noted   RECTAL: deferred    SKIN: No rashes or lesions
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
## 2020-01-12 NOTE — ED PROVIDER NOTE - CLINICAL SUMMARY MEDICAL DECISION MAKING FREE TEXT BOX
pt delirious, refusing workup. for abx and d/w dr mendoza for admission. while here, pt also noted with potential absence seizures where she stops responding for 30 sec but no tonic clonic activity.

## 2020-01-12 NOTE — H&P ADULT - NSHPLABSRESULTS_GEN_ALL_CORE
LABS:                        11.9   16.89 )-----------( 452      ( 12 Jan 2020 14:38 )             38.2     01-12    137  |  101  |  39<H>  ----------------------------<  252<H>  4.6   |  21<L>  |  1.61<H>    Ca    9.2      12 Jan 2020 14:38    TPro  8.1  /  Alb  3.3  /  TBili  0.4  /  DBili  x   /  AST  43<H>  /  ALT  51  /  AlkPhos  120  01-12    PT/INR - ( 12 Jan 2020 14:38 )   PT: 11.6 sec;   INR: 1.03 ratio         PTT - ( 12 Jan 2020 14:38 )  PTT:25.6 sec

## 2020-01-12 NOTE — ED ADULT NURSE NOTE - ED STAT RN HANDOFF DETAILS
Received pt from day shift ESVIN Huggins. Visualized pt in room P, pt is awake and denies complaints and appears in no acute distress.

## 2020-01-12 NOTE — H&P ADULT - ASSESSMENT
IMPROVE VTE Individual Risk Assessment    RISK                                                                Points    [  ] Previous VTE                                                  3    [  ] Thrombophilia                                               2    [  ] Lower limb paralysis                                      2        (unable to hold up >15 seconds)      [  ] Current Cancer                                              2         (within 6 months)    [ X ] Immobilization > 24 hrs                                1    [  ] ICU/CCU stay > 24 hours                              1    [X  ] Age > 60                                                      1    IMPROVE VTE Score _________    IMPROVE Score 0-1: Low Risk, No VTE prophylaxis required for most patients, encourage ambulation.   IMPROVE Score 2-3: At risk, pharmacologic VTE prophylaxis is indicated for most patients (in the absence of a contraindication)  IMPROVE Score > or = 4: High Risk, pharmacologic VTE prophylaxis is indicated for most patients (in the absence of a contraindication)
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
## 2020-01-12 NOTE — H&P ADULT - NSICDXPASTMEDICALHX_GEN_ALL_CORE_FT
PAST MEDICAL HISTORY:  Adult Hypothyroidism     Arthritis     Borderline Diabetes Mellitus     Chronic Asthma     Cigarette Smoker     Diverticulosis     H/O: HTN     High Cholesterol     RA (Rheumatoid Arthritis)     Tooth Abscess
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
## 2020-01-12 NOTE — ED PROVIDER NOTE - PHYSICAL EXAMINATION
Gen: Alert, NAD , agitated, yelling, hitting  Head: NC, AT, PERRL, normal lids/conjunctiva   ENT: Bilateral TM WNL, patent oropharynx without erythema/exudate, uvula midline  Neck: supple, no tenderness/meningismus  Pulm: Bilateral clear BS, normal resp effort  CV: RRR, no M/R/G, +dist pulses   Abd: soft, + large open abdominal wound with shallow denuded skin, +BS, no guarding/rebound tenderness  Mskel: no edema/erythema/cyanosis , RA hands  Skin: no rash, no bruising  Neuro: AAOx2, RLE weakness baseline, able to move left leg easily, CN 2-12 intact
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
## 2020-01-13 ENCOUNTER — TRANSCRIPTION ENCOUNTER (OUTPATIENT)
Age: 65
End: 2020-01-13

## 2020-01-13 VITALS — OXYGEN SATURATION: 97 %

## 2020-01-13 DIAGNOSIS — F05 DELIRIUM DUE TO KNOWN PHYSIOLOGICAL CONDITION: ICD-10-CM

## 2020-01-13 PROCEDURE — 90792 PSYCH DIAG EVAL W/MED SRVCS: CPT

## 2020-01-13 RX ORDER — CEFTRIAXONE 500 MG/1
1000 INJECTION, POWDER, FOR SOLUTION INTRAMUSCULAR; INTRAVENOUS ONCE
Refills: 0 | Status: DISCONTINUED | OUTPATIENT
Start: 2020-01-13 | End: 2020-01-13

## 2020-01-13 RX ORDER — INSULIN LISPRO 100/ML
10 VIAL (ML) SUBCUTANEOUS
Qty: 0 | Refills: 0 | DISCHARGE

## 2020-01-13 RX ORDER — GABAPENTIN 400 MG/1
1 CAPSULE ORAL
Qty: 0 | Refills: 0 | DISCHARGE

## 2020-01-13 RX ORDER — OXYCODONE HYDROCHLORIDE 5 MG/1
1 TABLET ORAL
Qty: 0 | Refills: 0 | DISCHARGE

## 2020-01-13 RX ORDER — INSULIN ASPART 100 [IU]/ML
8 INJECTION, SOLUTION SUBCUTANEOUS
Qty: 0 | Refills: 0 | DISCHARGE

## 2020-01-13 RX ORDER — CEFTRIAXONE 500 MG/1
1000 INJECTION, POWDER, FOR SOLUTION INTRAMUSCULAR; INTRAVENOUS EVERY 24 HOURS
Refills: 0 | Status: DISCONTINUED | OUTPATIENT
Start: 2020-01-13 | End: 2020-01-13

## 2020-01-13 RX ORDER — ENOXAPARIN SODIUM 100 MG/ML
20 INJECTION SUBCUTANEOUS
Qty: 0 | Refills: 0 | DISCHARGE

## 2020-01-13 RX ORDER — ALENDRONATE SODIUM 70 MG/1
1 TABLET ORAL
Qty: 0 | Refills: 0 | DISCHARGE

## 2020-01-13 RX ORDER — SODIUM CHLORIDE 9 MG/ML
1000 INJECTION INTRAMUSCULAR; INTRAVENOUS; SUBCUTANEOUS
Refills: 0 | Status: DISCONTINUED | OUTPATIENT
Start: 2020-01-13 | End: 2020-01-13

## 2020-01-13 RX ORDER — SITAGLIPTIN 50 MG/1
1 TABLET, FILM COATED ORAL
Qty: 0 | Refills: 0 | DISCHARGE

## 2020-01-13 RX ORDER — INSULIN LISPRO 100/ML
0 VIAL (ML) SUBCUTANEOUS
Qty: 0 | Refills: 0 | DISCHARGE

## 2020-01-13 RX ORDER — METOPROLOL TARTRATE 50 MG
1 TABLET ORAL
Qty: 0 | Refills: 0 | DISCHARGE

## 2020-01-13 RX ORDER — LORATADINE 10 MG/1
1 TABLET ORAL
Qty: 0 | Refills: 0 | DISCHARGE

## 2020-01-13 RX ORDER — HALOPERIDOL DECANOATE 100 MG/ML
2 INJECTION INTRAMUSCULAR ONCE
Refills: 0 | Status: COMPLETED | OUTPATIENT
Start: 2020-01-13 | End: 2020-01-13

## 2020-01-13 RX ORDER — POTASSIUM CHLORIDE 20 MEQ
1 PACKET (EA) ORAL
Qty: 0 | Refills: 0 | DISCHARGE

## 2020-01-13 RX ORDER — CLOPIDOGREL BISULFATE 75 MG/1
1 TABLET, FILM COATED ORAL
Qty: 0 | Refills: 0 | DISCHARGE

## 2020-01-13 RX ORDER — TRAMADOL HYDROCHLORIDE 50 MG/1
1 TABLET ORAL
Qty: 0 | Refills: 0 | DISCHARGE

## 2020-01-13 RX ADMIN — Medication 10 MILLIGRAM(S): at 16:49

## 2020-01-13 RX ADMIN — Medication 3 MILLILITER(S): at 18:08

## 2020-01-13 RX ADMIN — GABAPENTIN 400 MILLIGRAM(S): 400 CAPSULE ORAL at 16:48

## 2020-01-13 RX ADMIN — Medication 20 MILLIGRAM(S): at 16:48

## 2020-01-13 RX ADMIN — TRAMADOL HYDROCHLORIDE 50 MILLIGRAM(S): 50 TABLET ORAL at 16:49

## 2020-01-13 RX ADMIN — HALOPERIDOL DECANOATE 2 MILLIGRAM(S): 100 INJECTION INTRAMUSCULAR at 08:21

## 2020-01-13 NOTE — DISCHARGE NOTE PROVIDER - NSDCMRMEDTOKEN_GEN_ALL_CORE_FT
Advair Diskus 250 mcg-50 mcg inhalation powder: 1 puff(s) inhaled 2 times a day   albuterol 90 mcg/inh inhalation aerosol: 1 puff(s) inhaled every 4 hours as needed  alendronate 35 mg oral tablet: 1 tab(s) orally once a week  BIPAP: BIPAP  for use HS/PRN  20/10/21%  furosemide 40 mg oral tablet: 1 tab(s) orally once a day  gabapentin 300 mg oral capsule: 1 cap(s) orally 3 times a day  HumaLOG 100 units/mL subcutaneous solution: 0 Unit(s) if Glucose 61 - 250  2 Unit(s) if Glucose 251 - 300  4 Unit(s) if Glucose 301 - 350  6 Unit(s) if Glucose 351 - 400  8 Unit(s) if Glucose Greater Than 400  Januvia 50 mg oral tablet: 1 tab(s) orally once a day  Klor-Con M20 oral tablet, extended release: 1 tab(s) orally once a day  Lantus Solostar Pen 100 units/mL subcutaneous solution: 20 unit(s) subcutaneous once a day (at bedtime)  levothyroxine 137 mcg (0.137 mg) oral tablet: 1 tab(s) orally   Metoprolol Succinate ER 50 mg oral tablet, extended release: 1 tab(s) orally once a day  naproxen 250 mg oral tablet: 1 tab(s) orally every 12 hours, As Needed  NovoLOG FlexPen 100 units/mL injectable solution: 8 unit(s) injectable 3 times a day  nystatin 100,000 units/g topical powder: 1 application topically 2 times a day  pantoprazole 40 mg oral delayed release tablet: 1 tab(s) orally once a day (before a meal)  Plavix 75 mg oral tablet: 1 tab(s) orally once a day  predniSONE 10 mg oral tablet: 1.5 tab(s) orally once a day  primidone 50 mg oral tablet: 1 tab(s) orally once a day (at bedtime)  psyllium 3.4 g/7 g oral powder for reconstitution: 1 packet(s) orally once a day  senna oral tablet: 2 tab(s) orally once a day (at bedtime)  sildenafil 20 mg oral tablet: 1 tab(s) orally 3 times a day      Patient is taking medication for treatment of PHTN  simvastatin 20 mg oral tablet: 1 tab(s) orally once a day (at bedtime)  traMADol 300 mg/24 hours oral tablet, extended release: 1 tab(s) orally once a day, As Needed
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
## 2020-01-13 NOTE — BEHAVIORAL HEALTH ASSESSMENT NOTE - SUICIDE PROTECTIVE FACTORS
Supportive social network of family or friends Supportive social network of family or friends/Identifies reasons for living/Has future plans/Positive therapeutic relationships

## 2020-01-13 NOTE — BEHAVIORAL HEALTH ASSESSMENT NOTE - DESCRIPTION
HTN, DM2, COPD/CHRIS on nocturnal CPAP, oxygen dependent on 4L at baseline; 03/2019 - prolonged hospitalization for SBO requiring lysis of adhesions, complicated by hypercapneic respiratory failure w/ intubation; hx of foot drop secondary to L5-S1 severe narrowing + combination of arthritis, osteoporosis from chronic steroid use, and lack of mobility; ECHO 1/19 moderate pulmonary HTN

## 2020-01-13 NOTE — BEHAVIORAL HEALTH ASSESSMENT NOTE - SUMMARY
Acute hyperactive delirium  - continue work up and examination to narrow down the source(s) for the AMS Acute hyperactive delirium  - continue work up and examination to narrow down the source(s) for the AMS  - compounding factors for delirium also include taking standing corticosteroids daily, + chronic pain on opiates Acute hyperactive delirium  - continue work up and examination to narrow down the source(s) for the AMS  - compounding factors for delirium also include taking standing corticosteroids daily, + chronic pain on opiates  - Patient has no capacity at this time

## 2020-01-13 NOTE — BEHAVIORAL HEALTH ASSESSMENT NOTE - NSBHCHARTREVIEWIMAGING_PSY_A_CORE FT
CT showing diastesis recti with large ventral hernia w/ bowels; interval worsening of severe right hip arthrosis with flattening of the femoral head and superior subluxation
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
## 2020-01-13 NOTE — BEHAVIORAL HEALTH ASSESSMENT NOTE - NSBHCHARTREVIEWVS_PSY_A_CORE FT
T(C): --  HR: 92 (01-13-20 @ 09:45) (92 - 114)  BP: 124/94 (01-12-20 @ 14:08) (124/94 - 124/94)  RR: 20 (01-12-20 @ 14:08) (20 - 20)  SpO2: 95% (01-13-20 @ 09:45) (94% - 95%)
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
## 2020-01-13 NOTE — DISCHARGE NOTE PROVIDER - NSDCCPCAREPLAN_GEN_ALL_CORE_FT
PRINCIPAL DISCHARGE DIAGNOSIS  Diagnosis: Delirium  Assessment and Plan of Treatment: take all m edication as prescribed follow up woth out pt pcp tommorow or as soon as possible      SECONDARY DISCHARGE DIAGNOSES  Diagnosis: Hypothyroidism  Assessment and Plan of Treatment:

## 2020-01-13 NOTE — BEHAVIORAL HEALTH ASSESSMENT NOTE - OTHER
see HPI home health aide malodorous of urine low end of fair limited recently impaired deferred at this time "I want to go home" looks suspicious at times unable to tolerate an MMSE at this time

## 2020-01-13 NOTE — BEHAVIORAL HEALTH ASSESSMENT NOTE - NSBHCHARTREVIEWLAB_PSY_A_CORE FT
01-12    137  |  101  |  39<H>  ----------------------------<  252<H>  4.6   |  21<L>  |  1.61<H>    Ca    9.2      12 Jan 2020 14:38    TPro  8.1  /  Alb  3.3  /  TBili  0.4  /  DBili  x   /  AST  43<H>  /  ALT  51  /  AlkPhos  120  01-12

## 2020-01-13 NOTE — BEHAVIORAL HEALTH ASSESSMENT NOTE - NSBHCONSULTRECOMMENDOTHER_PSY_A_CORE FT
- GET URINE!    - consider Ortho consultation: CT showing interval worsening of severe right hip arthrosis with flattening of the femoral head and superior subluxation --> pain --> agitation     - ensure the diastesis recti with large ventral hernia w/ bowels is reduceable and not an active issue which is contributing to Pt's agitation - GET URINE! UA, UCx. rule out UTI     - consider Ortho consultation: CT showing interval worsening of severe right hip arthrosis with flattening of the femoral head and superior subluxation --> pain --> agitation     - ensure the diastesis recti with large ventral hernia w/ bowels is reduceable and not an active issue which is contributing to Pt's agitation
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
## 2020-01-13 NOTE — DISCHARGE NOTE PROVIDER - CARE PROVIDER_API CALL
Fabricio Stallworth)  [address] [place] [address]  Phone: [phone]  Fax: [phone]  Follow Up Time:

## 2020-01-13 NOTE — DISCHARGE NOTE PROVIDER - HOSPITAL COURSE
pt admit with ams tsh elevated significantly , vitals abnormal ,lactate elevated pt started on medical evaluation of abnormalities     pt wished ot go home ama on 1/13     psychiatry consult initial suggest pt stay for further evaluation subsequent reevaluation pt able to be discharged      the son and daughter are hcp , the case was discussed with them by nursing supervisor and they wanter the pt to go home    I discussed the case with dr Turner and he did not feel the pt was a afe discharge from [facility]     however if pt has been cleared by psychiarty and her son is will in to act as her home health aide until he can reinstate home care services. then pt could leave hospital ama

## 2020-01-17 DIAGNOSIS — F17.210 NICOTINE DEPENDENCE, CIGARETTES, UNCOMPLICATED: ICD-10-CM

## 2020-01-17 DIAGNOSIS — I10 ESSENTIAL (PRIMARY) HYPERTENSION: ICD-10-CM

## 2020-01-17 DIAGNOSIS — J45.909 UNSPECIFIED ASTHMA, UNCOMPLICATED: ICD-10-CM

## 2020-01-17 DIAGNOSIS — E66.9 OBESITY, UNSPECIFIED: ICD-10-CM

## 2020-01-17 DIAGNOSIS — R41.0 DISORIENTATION, UNSPECIFIED: ICD-10-CM

## 2020-01-17 DIAGNOSIS — I27.20 PULMONARY HYPERTENSION, UNSPECIFIED: ICD-10-CM

## 2020-01-17 DIAGNOSIS — E03.9 HYPOTHYROIDISM, UNSPECIFIED: ICD-10-CM

## 2020-01-17 DIAGNOSIS — A41.9 SEPSIS, UNSPECIFIED ORGANISM: ICD-10-CM

## 2020-01-17 DIAGNOSIS — E11.9 TYPE 2 DIABETES MELLITUS WITHOUT COMPLICATIONS: ICD-10-CM

## 2020-01-17 DIAGNOSIS — M06.9 RHEUMATOID ARTHRITIS, UNSPECIFIED: ICD-10-CM

## 2020-01-17 DIAGNOSIS — G47.33 OBSTRUCTIVE SLEEP APNEA (ADULT) (PEDIATRIC): ICD-10-CM

## 2020-01-17 DIAGNOSIS — F05 DELIRIUM DUE TO KNOWN PHYSIOLOGICAL CONDITION: ICD-10-CM

## 2020-01-17 LAB
CULTURE RESULTS: SIGNIFICANT CHANGE UP
SPECIMEN SOURCE: SIGNIFICANT CHANGE UP

## 2020-01-24 ENCOUNTER — INPATIENT (INPATIENT)
Facility: HOSPITAL | Age: 65
LOS: 62 days | Discharge: HOME | DRG: 643 | End: 2020-03-27
Attending: INTERNAL MEDICINE | Admitting: HOSPITALIST
Payer: COMMERCIAL

## 2020-01-24 VITALS
RESPIRATION RATE: 18 BRPM | SYSTOLIC BLOOD PRESSURE: 107 MMHG | HEART RATE: 119 BPM | DIASTOLIC BLOOD PRESSURE: 81 MMHG | HEIGHT: 62 IN | WEIGHT: 207.9 LBS | OXYGEN SATURATION: 100 %

## 2020-01-24 PROCEDURE — 99291 CRITICAL CARE FIRST HOUR: CPT

## 2020-01-24 PROCEDURE — 93010 ELECTROCARDIOGRAM REPORT: CPT

## 2020-01-24 RX ORDER — OLANZAPINE 15 MG/1
10 TABLET, FILM COATED ORAL ONCE
Refills: 0 | Status: COMPLETED | OUTPATIENT
Start: 2020-01-24 | End: 2020-01-24

## 2020-01-24 RX ORDER — MIDAZOLAM HYDROCHLORIDE 1 MG/ML
5 INJECTION, SOLUTION INTRAMUSCULAR; INTRAVENOUS ONCE
Refills: 0 | Status: DISCONTINUED | OUTPATIENT
Start: 2020-01-24 | End: 2020-01-24

## 2020-01-24 RX ORDER — HALOPERIDOL DECANOATE 100 MG/ML
5 INJECTION INTRAMUSCULAR ONCE
Refills: 0 | Status: DISCONTINUED | OUTPATIENT
Start: 2020-01-24 | End: 2020-01-24

## 2020-01-24 RX ORDER — HALOPERIDOL DECANOATE 100 MG/ML
5 INJECTION INTRAMUSCULAR ONCE
Refills: 0 | Status: COMPLETED | OUTPATIENT
Start: 2020-01-24 | End: 2020-01-24

## 2020-01-24 RX ORDER — MIDAZOLAM HYDROCHLORIDE 1 MG/ML
2 INJECTION, SOLUTION INTRAMUSCULAR; INTRAVENOUS ONCE
Refills: 0 | Status: DISCONTINUED | OUTPATIENT
Start: 2020-01-24 | End: 2020-01-24

## 2020-01-24 RX ORDER — OLANZAPINE 15 MG/1
10 TABLET, FILM COATED ORAL ONCE
Refills: 0 | Status: DISCONTINUED | OUTPATIENT
Start: 2020-01-24 | End: 2020-01-24

## 2020-01-24 RX ADMIN — MIDAZOLAM HYDROCHLORIDE 5 MILLIGRAM(S): 1 INJECTION, SOLUTION INTRAMUSCULAR; INTRAVENOUS at 12:50

## 2020-01-24 RX ADMIN — HALOPERIDOL DECANOATE 5 MILLIGRAM(S): 100 INJECTION INTRAMUSCULAR at 14:31

## 2020-01-24 RX ADMIN — OLANZAPINE 10 MILLIGRAM(S): 15 TABLET, FILM COATED ORAL at 19:02

## 2020-01-24 RX ADMIN — HALOPERIDOL DECANOATE 5 MILLIGRAM(S): 100 INJECTION INTRAMUSCULAR at 12:50

## 2020-01-24 RX ADMIN — OLANZAPINE 10 MILLIGRAM(S): 15 TABLET, FILM COATED ORAL at 22:57

## 2020-01-24 RX ADMIN — MIDAZOLAM HYDROCHLORIDE 2 MILLIGRAM(S): 1 INJECTION, SOLUTION INTRAMUSCULAR; INTRAVENOUS at 22:57
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
## 2020-01-24 NOTE — ED ADULT NURSE REASSESSMENT NOTE - NS ED NURSE REASSESS COMMENT FT1
Report received from Amarilis MCALLISTER. Pt smells of urine, refusing to be changed. Refusing blood draw and straight cath at this time. No IV in place. Refusing bedside cardiac monitor and continues to take of continuous pulse oximetry. MD Landeros aware.  1:1 at bedside to monitor. Pt not attempting to get out of bed. Able to obtain VS, VS as documented. Comfort and safety measures maintained. Transfer from [facility], pt in bed, noted acute distress and discomfort, c/o pain in scrotum relieved with PRN pain medication, VSS, AAO x 4, no injuries or falls, family at bedside, placed ABD pads under scrotum to lift and covered area with 2 x 2 gauze, bed in low locked position, call light in reach, hourly rounds complete, will continue to assess

## 2020-01-24 NOTE — ED PROVIDER NOTE - PMH
Adult Hypothyroidism    Arthritis    Borderline Diabetes Mellitus    Chronic Asthma    Cigarette Smoker    Diverticulosis    H/O: HTN    High Cholesterol    RA (Rheumatoid Arthritis)    Tooth Abscess

## 2020-01-24 NOTE — ED PROVIDER NOTE - ATTENDING CONTRIBUTION TO CARE
Patient BIBEMS for agitation and paranoia.  History largely provided by son who is reporting paranoid behaviours and aggression towards son and home aides.  Son reports that she has not slept in about 5 daysApproximately one week ago patient was admitted [facility] without source noted and was signed out AMA on second day of admission after providers determined she had competency.  On arrival to Emergency Department patient acting aggressively towards Emergency Department staff, refusing obtaining vital signs, IV access, repeating her social security number and demanding that "we charge this to that" but cannot provide history of recent events, cannot explain why she was brought to the Emergency Department or vocalize comprehension of what staff or sons concerns are about her behaviour.    Unable to obtain ROS secondary to confusion/delerium.    Unable to obtain physical exam as patient becomes combative with staff if approached.    Presentation concerning for delerium of unclear etiology, combative with Emergency Department staff, requiring multiple doses of IM antipsychotics and sedatives and still remaining too combative to obtain exam/imaging or IV access for labs, patient signed out to oncoming attending with plan to continue to attempt sedation to facilitate evaluation and ultimate admission for workup of delerium

## 2020-01-24 NOTE — ED PROVIDER NOTE - OBJECTIVE STATEMENT
63yo with PMH of HTN, DM, CHRIS on CPAP, RA presenting for altered mental status. Most ot the history is obtained from patient's son, Norris, as patient is combative and aggressive in the room, fighting with the nurses.    Patient was recently admitted at [facility] from 1/12-1/13 for AMS. While undergoing workup, patient became agitated and signed out AMA after psych eval deemed she had capacity. Since then, patient's son states she has not been well and continues to be altered. She has no sense of time and is often yelling and fighting. Her son called EMS to bring her into the ED because he's concerned she has an infection which is causing her to act this way. 63yo with PMH of HTN, DM, CHRIS on CPAP, RA presenting for altered mental status. Most ot the history is obtained from patient's son, Norris, as patient is combative and aggressive in the room, fighting with the nurses.    Patient was recently admitted at [facility] from 1/12-1/13 for AMS. While undergoing workup, patient became agitated and signed out AMA after psych eval deemed she had capacity. Since then, patient's son states she has not been well and continues to be altered. She has no sense of time and is often yelling and fighting. Her son called EMS to bring her into the ED because he's concerned she has an infection which is causing her to act this way.    Additional Hx 1/25/20 5:30AM (ZAIRE): Pt's son and health care proxy "Norris" updated via phone. He reports pt has no h/o mental health issues and change of behavior began 3 weeks ago acutely. Pt has never acted this way. Son reports he works in mental health industry and does not feel this is behavioral in nature. He strongly expresses concern there is an acute medical process occurring. He does not want patient to go to a nursing facility. His goal is for patient to return home. He reports he will request patient be transferred to [facility] if [facility] is unable "to find out what is happening".
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
## 2020-01-24 NOTE — ED PROVIDER NOTE - CLINICAL SUMMARY MEDICAL DECISION MAKING FREE TEXT BOX
65yo with PMH of HTN, DM, CHRIS on CPAP, RA presenting for altered mental status with severe agitation and aggressiveness in the evaluation room. Patient refusing nursing care, screaming and yelling at nurses expressing distrust in them. Will medicate patient with haldol and versed, obtain labs and re-assess.

## 2020-01-24 NOTE — ED ADULT NURSE REASSESSMENT NOTE - NS ED NURSE REASSESS COMMENT FT1
Pt actively spitting at and violent with ED RN. Pt refusing all patient care at this time. Awaiting psych consult. 1:1 at bedside for safety. MD Landeros and MD Bull in room to assess. Safety measures maintained.

## 2020-01-25 DIAGNOSIS — Z02.9 ENCOUNTER FOR ADMINISTRATIVE EXAMINATIONS, UNSPECIFIED: ICD-10-CM

## 2020-01-25 DIAGNOSIS — R41.82 ALTERED MENTAL STATUS, UNSPECIFIED: ICD-10-CM

## 2020-01-25 DIAGNOSIS — E03.9 HYPOTHYROIDISM, UNSPECIFIED: ICD-10-CM

## 2020-01-25 DIAGNOSIS — M19.90 UNSPECIFIED OSTEOARTHRITIS, UNSPECIFIED SITE: ICD-10-CM

## 2020-01-25 DIAGNOSIS — Z29.9 ENCOUNTER FOR PROPHYLACTIC MEASURES, UNSPECIFIED: ICD-10-CM

## 2020-01-25 DIAGNOSIS — E11.9 TYPE 2 DIABETES MELLITUS WITHOUT COMPLICATIONS: ICD-10-CM

## 2020-01-25 DIAGNOSIS — F29 UNSPECIFIED PSYCHOSIS NOT DUE TO A SUBSTANCE OR KNOWN PHYSIOLOGICAL CONDITION: ICD-10-CM

## 2020-01-25 DIAGNOSIS — I27.20 PULMONARY HYPERTENSION, UNSPECIFIED: ICD-10-CM

## 2020-01-25 LAB
ALBUMIN SERPL ELPH-MCNC: 3.5 G/DL — SIGNIFICANT CHANGE UP (ref 3.3–5)
ALP SERPL-CCNC: 92 U/L — SIGNIFICANT CHANGE UP (ref 40–120)
ALT FLD-CCNC: 20 U/L — SIGNIFICANT CHANGE UP (ref 10–45)
AMMONIA BLD-MCNC: 15 UMOL/L — SIGNIFICANT CHANGE UP (ref 11–55)
ANION GAP SERPL CALC-SCNC: 17 MMOL/L — SIGNIFICANT CHANGE UP (ref 5–17)
ANION GAP SERPL CALC-SCNC: 18 MMOL/L — HIGH (ref 5–17)
APAP SERPL-MCNC: <15 UG/ML — SIGNIFICANT CHANGE UP (ref 10–30)
APTT BLD: 25.5 SEC — LOW (ref 27.5–36.3)
AST SERPL-CCNC: 14 U/L — SIGNIFICANT CHANGE UP (ref 10–40)
BASE EXCESS BLDV CALC-SCNC: -3.1 MMOL/L — LOW (ref -2–2)
BASOPHILS # BLD AUTO: 0.07 K/UL — SIGNIFICANT CHANGE UP (ref 0–0.2)
BASOPHILS NFR BLD AUTO: 0.7 % — SIGNIFICANT CHANGE UP (ref 0–2)
BILIRUB SERPL-MCNC: 0.1 MG/DL — LOW (ref 0.2–1.2)
BUN SERPL-MCNC: 15 MG/DL — SIGNIFICANT CHANGE UP (ref 7–23)
BUN SERPL-MCNC: 19 MG/DL — SIGNIFICANT CHANGE UP (ref 7–23)
CA-I SERPL-SCNC: 1.25 MMOL/L — SIGNIFICANT CHANGE UP (ref 1.12–1.3)
CALCIUM SERPL-MCNC: 8.6 MG/DL — SIGNIFICANT CHANGE UP (ref 8.4–10.5)
CALCIUM SERPL-MCNC: 9.2 MG/DL — SIGNIFICANT CHANGE UP (ref 8.4–10.5)
CHLORIDE BLDV-SCNC: 103 MMOL/L — SIGNIFICANT CHANGE UP (ref 96–108)
CHLORIDE SERPL-SCNC: 101 MMOL/L — SIGNIFICANT CHANGE UP (ref 96–108)
CHLORIDE SERPL-SCNC: 99 MMOL/L — SIGNIFICANT CHANGE UP (ref 96–108)
CO2 BLDV-SCNC: 23 MMOL/L — SIGNIFICANT CHANGE UP (ref 22–30)
CO2 SERPL-SCNC: 19 MMOL/L — LOW (ref 22–31)
CO2 SERPL-SCNC: 20 MMOL/L — LOW (ref 22–31)
CREAT SERPL-MCNC: 0.84 MG/DL — SIGNIFICANT CHANGE UP (ref 0.5–1.3)
CREAT SERPL-MCNC: 0.96 MG/DL — SIGNIFICANT CHANGE UP (ref 0.5–1.3)
EOSINOPHIL # BLD AUTO: 0.44 K/UL — SIGNIFICANT CHANGE UP (ref 0–0.5)
EOSINOPHIL NFR BLD AUTO: 4.6 % — SIGNIFICANT CHANGE UP (ref 0–6)
ETHANOL SERPL-MCNC: SIGNIFICANT CHANGE UP MG/DL (ref 0–10)
GAS PNL BLDV: 142 MMOL/L — SIGNIFICANT CHANGE UP (ref 135–145)
GAS PNL BLDV: SIGNIFICANT CHANGE UP
GAS PNL BLDV: SIGNIFICANT CHANGE UP
GLUCOSE BLDV-MCNC: 241 MG/DL — HIGH (ref 70–99)
GLUCOSE SERPL-MCNC: 213 MG/DL — HIGH (ref 70–99)
GLUCOSE SERPL-MCNC: 239 MG/DL — HIGH (ref 70–99)
HCO3 BLDV-SCNC: 22 MMOL/L — SIGNIFICANT CHANGE UP (ref 21–29)
HCT VFR BLD CALC: 31.8 % — LOW (ref 34.5–45)
HCT VFR BLDA CALC: 29 % — LOW (ref 39–50)
HGB BLD CALC-MCNC: 9.3 G/DL — LOW (ref 11.5–15.5)
HGB BLD-MCNC: 10 G/DL — LOW (ref 11.5–15.5)
IMM GRANULOCYTES NFR BLD AUTO: 0.5 % — SIGNIFICANT CHANGE UP (ref 0–1.5)
INR BLD: 0.97 RATIO — SIGNIFICANT CHANGE UP (ref 0.88–1.16)
LACTATE BLDV-MCNC: 1.2 MMOL/L — SIGNIFICANT CHANGE UP (ref 0.7–2)
LYMPHOCYTES # BLD AUTO: 3.34 K/UL — HIGH (ref 1–3.3)
LYMPHOCYTES # BLD AUTO: 35.2 % — SIGNIFICANT CHANGE UP (ref 13–44)
MAGNESIUM SERPL-MCNC: 2 MG/DL — SIGNIFICANT CHANGE UP (ref 1.6–2.6)
MAGNESIUM SERPL-MCNC: 2.1 MG/DL — SIGNIFICANT CHANGE UP (ref 1.6–2.6)
MCHC RBC-ENTMCNC: 31.3 PG — SIGNIFICANT CHANGE UP (ref 27–34)
MCHC RBC-ENTMCNC: 31.4 GM/DL — LOW (ref 32–36)
MCV RBC AUTO: 99.7 FL — SIGNIFICANT CHANGE UP (ref 80–100)
MONOCYTES # BLD AUTO: 0.96 K/UL — HIGH (ref 0–0.9)
MONOCYTES NFR BLD AUTO: 10.1 % — SIGNIFICANT CHANGE UP (ref 2–14)
NEUTROPHILS # BLD AUTO: 4.63 K/UL — SIGNIFICANT CHANGE UP (ref 1.8–7.4)
NEUTROPHILS NFR BLD AUTO: 48.9 % — SIGNIFICANT CHANGE UP (ref 43–77)
NRBC # BLD: 0 /100 WBCS — SIGNIFICANT CHANGE UP (ref 0–0)
OTHER CELLS CSF MANUAL: 9 ML/DL — LOW (ref 18–22)
PCO2 BLDV: 42 MMHG — SIGNIFICANT CHANGE UP (ref 35–50)
PH BLDV: 7.34 — LOW (ref 7.35–7.45)
PHOSPHATE SERPL-MCNC: 2.2 MG/DL — LOW (ref 2.5–4.5)
PHOSPHATE SERPL-MCNC: 2.5 MG/DL — SIGNIFICANT CHANGE UP (ref 2.5–4.5)
PLATELET # BLD AUTO: 354 K/UL — SIGNIFICANT CHANGE UP (ref 150–400)
PO2 BLDV: 42 MMHG — SIGNIFICANT CHANGE UP (ref 25–45)
POTASSIUM BLDV-SCNC: 3.7 MMOL/L — SIGNIFICANT CHANGE UP (ref 3.5–5.3)
POTASSIUM SERPL-MCNC: 3.4 MMOL/L — LOW (ref 3.5–5.3)
POTASSIUM SERPL-MCNC: 3.6 MMOL/L — SIGNIFICANT CHANGE UP (ref 3.5–5.3)
POTASSIUM SERPL-SCNC: 3.4 MMOL/L — LOW (ref 3.5–5.3)
POTASSIUM SERPL-SCNC: 3.6 MMOL/L — SIGNIFICANT CHANGE UP (ref 3.5–5.3)
PROCALCITONIN SERPL-MCNC: 0.08 NG/ML — SIGNIFICANT CHANGE UP (ref 0.02–0.1)
PROT SERPL-MCNC: 6.6 G/DL — SIGNIFICANT CHANGE UP (ref 6–8.3)
PROTHROM AB SERPL-ACNC: 11.1 SEC — SIGNIFICANT CHANGE UP (ref 10–12.9)
RBC # BLD: 3.19 M/UL — LOW (ref 3.8–5.2)
RBC # FLD: 19.5 % — HIGH (ref 10.3–14.5)
SALICYLATES SERPL-MCNC: <2 MG/DL — LOW (ref 15–30)
SAO2 % BLDV: 71 % — SIGNIFICANT CHANGE UP (ref 67–88)
SODIUM SERPL-SCNC: 137 MMOL/L — SIGNIFICANT CHANGE UP (ref 135–145)
SODIUM SERPL-SCNC: 137 MMOL/L — SIGNIFICANT CHANGE UP (ref 135–145)
T3 SERPL-MCNC: 24 NG/DL — LOW (ref 80–200)
T4 AB SER-ACNC: 1.6 UG/DL — LOW (ref 4.6–12)
TSH SERPL-MCNC: 101 UIU/ML — HIGH (ref 0.27–4.2)
WBC # BLD: 9.49 K/UL — SIGNIFICANT CHANGE UP (ref 3.8–10.5)
WBC # FLD AUTO: 9.49 K/UL — SIGNIFICANT CHANGE UP (ref 3.8–10.5)

## 2020-01-25 PROCEDURE — 99232 SBSQ HOSP IP/OBS MODERATE 35: CPT | Mod: GC

## 2020-01-25 PROCEDURE — 99223 1ST HOSP IP/OBS HIGH 75: CPT | Mod: GC

## 2020-01-25 RX ORDER — METOPROLOL TARTRATE 50 MG
50 TABLET ORAL DAILY
Refills: 0 | Status: DISCONTINUED | OUTPATIENT
Start: 2020-01-25 | End: 2020-01-25

## 2020-01-25 RX ORDER — SODIUM CHLORIDE 9 MG/ML
2000 INJECTION INTRAMUSCULAR; INTRAVENOUS; SUBCUTANEOUS ONCE
Refills: 0 | Status: COMPLETED | OUTPATIENT
Start: 2020-01-25 | End: 2020-01-25

## 2020-01-25 RX ORDER — LEVOTHYROXINE SODIUM 125 MCG
137 TABLET ORAL AT BEDTIME
Refills: 0 | Status: DISCONTINUED | OUTPATIENT
Start: 2020-01-25 | End: 2020-01-26

## 2020-01-25 RX ORDER — INSULIN GLARGINE 100 [IU]/ML
20 INJECTION, SOLUTION SUBCUTANEOUS AT BEDTIME
Refills: 0 | Status: DISCONTINUED | OUTPATIENT
Start: 2020-01-25 | End: 2020-01-25

## 2020-01-25 RX ORDER — METOPROLOL TARTRATE 50 MG
50 TABLET ORAL DAILY
Refills: 0 | Status: DISCONTINUED | OUTPATIENT
Start: 2020-01-25 | End: 2020-02-12

## 2020-01-25 RX ORDER — INSULIN LISPRO 100/ML
2 VIAL (ML) SUBCUTANEOUS ONCE
Refills: 0 | Status: DISCONTINUED | OUTPATIENT
Start: 2020-01-25 | End: 2020-01-25

## 2020-01-25 RX ORDER — PANTOPRAZOLE SODIUM 20 MG/1
40 TABLET, DELAYED RELEASE ORAL
Refills: 0 | Status: DISCONTINUED | OUTPATIENT
Start: 2020-01-25 | End: 2020-02-12

## 2020-01-25 RX ORDER — INFLUENZA VIRUS VACCINE 15; 15; 15; 15 UG/.5ML; UG/.5ML; UG/.5ML; UG/.5ML
0.5 SUSPENSION INTRAMUSCULAR ONCE
Refills: 0 | Status: DISCONTINUED | OUTPATIENT
Start: 2020-01-25 | End: 2020-03-27

## 2020-01-25 RX ORDER — SIMVASTATIN 20 MG/1
1 TABLET, FILM COATED ORAL
Qty: 0 | Refills: 0 | DISCHARGE

## 2020-01-25 RX ORDER — LEVOTHYROXINE SODIUM 125 MCG
75 TABLET ORAL AT BEDTIME
Refills: 0 | Status: DISCONTINUED | OUTPATIENT
Start: 2020-01-25 | End: 2020-01-25

## 2020-01-25 RX ORDER — ENOXAPARIN SODIUM 100 MG/ML
40 INJECTION SUBCUTANEOUS EVERY 12 HOURS
Refills: 0 | Status: DISCONTINUED | OUTPATIENT
Start: 2020-01-25 | End: 2020-02-01

## 2020-01-25 RX ORDER — INSULIN LISPRO 100/ML
4 VIAL (ML) SUBCUTANEOUS ONCE
Refills: 0 | Status: COMPLETED | OUTPATIENT
Start: 2020-01-25 | End: 2020-01-25

## 2020-01-25 RX ORDER — PRIMIDONE 250 MG/1
50 TABLET ORAL AT BEDTIME
Refills: 0 | Status: DISCONTINUED | OUTPATIENT
Start: 2020-01-25 | End: 2020-03-27

## 2020-01-25 RX ORDER — SIMVASTATIN 20 MG/1
20 TABLET, FILM COATED ORAL AT BEDTIME
Refills: 0 | Status: DISCONTINUED | OUTPATIENT
Start: 2020-01-25 | End: 2020-03-27

## 2020-01-25 RX ADMIN — SODIUM CHLORIDE 2000 MILLILITER(S): 9 INJECTION INTRAMUSCULAR; INTRAVENOUS; SUBCUTANEOUS at 06:08

## 2020-01-25 RX ADMIN — Medication 4 UNIT(S): at 06:07

## 2020-01-25 RX ADMIN — INSULIN GLARGINE 20 UNIT(S): 100 INJECTION, SOLUTION SUBCUTANEOUS at 06:06

## 2020-01-25 NOTE — BEHAVIORAL HEALTH ASSESSMENT NOTE - NSBHREFERDETAILS_PSY_A_CORE_FT
64 year old w/ AMS, hx HTN, DM, recently left AMA from [place] where being worked up for AMS. Patient agitated on unit yesterday.

## 2020-01-25 NOTE — PATIENT PROFILE ADULT - NSPROHMDIABETMGMTSTRAT_GEN_A_NUR
adequate rest/diet modification/routine screenings/exercise/activity/blood glucose testing/coping strategies/insulin therapy/medication therapy/weight management

## 2020-01-25 NOTE — H&P ADULT - PROBLEM SELECTOR PLAN 4
Slight AG on arrival w/ elevated BHB however received 20U Lantus this AM, Unable to obtain urine as patient Slight AG on arrival w/ elevated BHB however received 20U Lantus this AM, unlikely DKA as AG low, w/o acidosis or highly elevated sugars.   -c/w lantus 20U (received in AM) will hold off on given in PM, will start sliding scale for pre-meals, can consider NPH In AM,    -Endocrine will see patient in AM. c/w Sildenafil 20mg TiD, c/w metoprolol, Echo showing mild pulmonary HTN  -need to follow up with pulmonologist on Monday to clarify as unclear why on pulm HTN treatment with mild pulm HTN  -
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
## 2020-01-25 NOTE — BEHAVIORAL HEALTH ASSESSMENT NOTE - NSBHCHARTREVIEWVS_PSY_A_CORE FT
T(C): 37.2 (25 Jan 2020 05:20), Max: 37.2 (25 Jan 2020 05:20)  T(F): 98.9 (25 Jan 2020 05:20), Max: 98.9 (25 Jan 2020 05:20)  HR: 109 (25 Jan 2020 05:20) (72 - 122)  BP: 115/82 (25 Jan 2020 05:20) (107/81 - 160/95)  RR: 18 (25 Jan 2020 05:20) (17 - 22)  SpO2: 97% (25 Jan 2020 05:20) (97% - 100%)

## 2020-01-25 NOTE — H&P ADULT - PROBLEM SELECTOR PLAN 6
, T3 24, T4 1.6 will add on free t4,  -c/w levothyroxine 137mcg daily (patient taken dose this AM) poor adherence to medication given  recent psychosis , T3 24, T4 1.6 will add on free t4, unlikely myxedema coma in setting of normal glucose and normal sodium as well as agitation and not lethargy   -c/w levothyroxine 137mcg daily (patient taken dose this AM) poor adherence to medication given recent psychosis, will give additional 75mcg IV x1 today, continue in AM.   -Endocrine will see patient in AM will continue with prednisone 15mg, can have tramadol prn.

## 2020-01-25 NOTE — ED ADULT NURSE REASSESSMENT NOTE - NS ED NURSE REASSESS COMMENT FT1
Report received from ESVIN Henry at 1900 in [place]. Unable to obtain repeat BMP at 0715, patient refused blood work. Unable to obtain from IV access, will reattempt and continue to monitor. Report received from ESVIN Henry at 0700 in [place]. Unable to obtain repeat BMP at 0715, patient refused blood work. Unable to obtain from IV access, will reattempt and continue to monitor.

## 2020-01-25 NOTE — BEHAVIORAL HEALTH ASSESSMENT NOTE - HPI (INCLUDE ILLNESS QUALITY, SEVERITY, DURATION, TIMING, CONTEXT, MODIFYING FACTORS, ASSOCIATED SIGNS AND SYMPTOMS)
64 year old F w/ PMH of HTN, DM, CHRIS on CPAP, RA p/w altered mental status. Per chart, patient was aggressive and combative in the room; per chart, son states that patient has no psychiatric history and change in behavior occurred 3 weeks ago acutely.     Patient interview was limited by patient's sedation. Patient was arousable, but refused to engage in interview and would open her eyes when her name is called several times, look at interviewer, and close her eyes and go back to sleep. Unable to complete assessment at this time.

## 2020-01-25 NOTE — H&P ADULT - NSHPPHYSICALEXAM_GEN_ALL_CORE
Gen: awake, alert  HENT: puffy under eye lids, eye tracking, pt refusing most of exam  Lymph: pt refusing most of exam  Eye: PERRL, sclerae anicteric  CV: pt refusing most of exam  Pulm: pt refusing most of exam  Abd: pt refusing most of exam  Skin: warm, dry, multiple areas of eccymosisis on left arm   Ext: +2 pitting edema   Neuro: pt refusing most of exam  Psych: agitated, aggressive tone, screams to leave her alone. Gen: awake, alert  HENT: puffy under eye lids, eye tracking. + Queen michelle sign.   Lymph: NO cervical LAD  Eye: PERRL, sclerae anicteric  CV: +S1S2 rrr. No m/r/g  Pulm: CTA b/l anteriorly, no rhonchi or wheezing on exam.  Abd: obese.  HAs large pad over ventral site, looked underneath with skin tears along wound site, does not appear infected.   Skin: warm, dry, multiple areas of eccymosisis on left arm   Ext: +2 pitting edema   Neuro: 2/5 RLE strength, 1/5 LLE strenght, per patient this is CHRONIC, has been bedbound for months.   Psych: agitated, aggressive tone, paranoid.

## 2020-01-25 NOTE — ED ADULT NURSE REASSESSMENT NOTE - NS ED NURSE REASSESS COMMENT FT1
Report given to ESVIN Joshi to [place]. Patient awaiting urine collection, refusal for straight catheterization. Psych consult at 0900, unable to assess due to pt sleeping. Patient awake and responsive at this time.

## 2020-01-25 NOTE — H&P ADULT - NSHPREVIEWOFSYSTEMS_GEN_ALL_CORE
64f hx arthritis, hypothyroidism, pulmonary HTN, DM on insulin, COPD/CHRIS on noctournal CPAP, RA on chronic prednisone, SBO s/p resection c/b eviseration s/p retention sutures c/b hypercapnic resp failure and       Increase in thirst (drinks about 2gallons) day, constipation (large hard stool), see HPI
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
## 2020-01-25 NOTE — CONSULT NOTE ADULT - SUBJECTIVE AND OBJECTIVE BOX
HPI: 65yo woman with hypothyroidism, borderline DM, HTN, CHRIS on CPAP, rheumatoid arthritis, bed bound at baseline 2/2 complication from ?abdominal surgery in the past admitted to [facility] for metabolic derangements with Neurology consult for agitation and question of autoimmune encephalitis. Patient is a poor historian and information is received from chart review and the ED Physician. Patient was recently admitted and found to have elevated TSH and left AMA from [facility] around 2020, lives alone at home, and is presenting with altered mental status, found to be combative with the nursing staff and concern despite receiving several doses of versed she is still agitated. Patient refuses to tell me more history of why she is here. She lives at home by herself and tells me she has a 24h nursing aid that comes by, and that she has her insurance paying for it.    Of note in review of labs her  with the most recent TSH 29    REVIEW OF SYSTEMS  patient refuses to answer these questions    PAST MEDICAL & SURGICAL HISTORY:  Diverticulosis  RA (Rheumatoid Arthritis)  Tooth Abscess  Arthritis  Cigarette Smoker  Chronic Asthma  Adult Hypothyroidism  Borderline Diabetes Mellitus  High Cholesterol  H/O: HTN  Wrist Disorder: S/P Surgery  History of  Section    FAMILY HISTORY:  No pertinent family history in first degree relatives    MEDICATIONS (HOME):  Home Medications:    MEDICATIONS  (STANDING):  insulin glargine Injectable (LANTUS) 20 Unit(s) SubCutaneous at bedtime    MEDICATIONS  (PRN):    ALLERGIES/INTOLERANCES:  Allergies  No Known Allergies    Intolerances  Seroquel (Other)    VITALS & EXAMINATION:  Vital Signs Last 24 Hrs  T(C): 36.9 (2020 11:25), Max: 37.2 (2020 05:20)  T(F): 98.5 (2020 11:25), Max: 98.9 (2020 05:20)  HR: 116 (2020 11:25) (72 - 116)  BP: 151/78 (2020 11:25) (107/85 - 151/78)  BP(mean): --  RR: 18 (2020 11:25) (18 - 22)  SpO2: 98% (2020 11:25) (97% - 100%)    General:  Constitutional: Obese Female, appears stated age, appears in no respiratory distress  noted that both of her eyes are puffy    Please note that patient refused much of the neurological examination    Neurological (>12):  MS: Awake, alert, knows where she is, tells me her age, the month, the year, fast speech that is not dysarthric with intact repetition and comprehension. Normal affect. Follows some simple commands and refuses to follow other simple commands.    CNs: PERRLA (R = 3mm, L = 3mm). VFF. no gross nystagmus nor diplopia appreciated. No facial asymmetry b/l, full eye closure strength b/l. Hearing grossly normal (rubbing fingers) b/l. Symmetric palate elevation in midline. Gag reflex deferred. Head turning & shoulder shrug intact b/l. Tongue midline, normal movements, no atrophy.    Motor: patient does not allow me to touch her, she is able to move her right arm and keep antigravity, she tells me she can barely move her left arm at baseline and she keeps it antigravity < 5 seconds; she can wiggle her toes but at baseline they are not antigravity	     Sensation: patient would not allow me to perform this part of the exam     Cortical: Extinction on DSS (neglect): not performed    Reflexes:  patient would not allow me to perform reflexes    Coordination: patient refused to perform FTN or HTS testing    Gait: patient refused    LABORATORY:  CBC                       10.0   9.49  )-----------( 354      ( 2020 00:57 )             31.8     Chem     137  |  101  |  15  ----------------------------<  213<H>  3.4<L>   |  19<L>  |  0.84    Ca    8.6      2020 08:42  Phos  2.5       Mg     2.1         TPro  6.6  /  Alb  3.5  /  TBili  0.1<L>  /  DBili  x   /  AST  14  /  ALT  20  /  AlkPhos  92      LFTs LIVER FUNCTIONS - ( 2020 00:57 )  Alb: 3.5 g/dL / Pro: 6.6 g/dL / ALK PHOS: 92 U/L / ALT: 20 U/L / AST: 14 U/L / GGT: x           Coagulopathy PT/INR - ( 2020 00:57 )   PT: 11.1 sec;   INR: 0.97 ratio         PTT - ( 2020 00:57 )  PTT:25.5 sec  Lipid Panel   A1c   Cardiac enzymes     U/A   CSF  Immunological  Other    STUDIES & IMAGING:  Studies (EKG, EEG, EMG, etc):     Radiology (XR, CT, MR, U/S, TTE/MATEO):    < from: CT Head No Cont (20 @ 18:20) >  FINDINGS:  No acute intracranial hemorrhage, mass effect or midline shift.  No CT evidence of acute large territory vascular infarct.  The ventricles and cortical sulci are prominent reflecting parenchymal volume loss.  Patchy hypodensities in the periventricular white matter are nonspecific, but likely sequela of small vessel ischemic disease.  Intracranial atherosclerotic calcifications are present.    Interval partial opacification of the right sphenoid sinus with hyperdense contents likely related to inspissation; correlate for fungal superinfection. The mastoid air cells are well aerated. The native ocular lenses are surgically absent.  No displaced calvarial fracture.    IMPRESSION:  No acute intracranial hemorrhage or mass effect.    < end of copied text >

## 2020-01-25 NOTE — H&P ADULT - PROBLEM SELECTOR PLAN 3
c/w Sildenafil 20mg TiD c/w Sildenafil 20mg TiD, c/w metoprolol, Echo showing mild pulmonary HTN Patient does not meet qSOFA scoring nor SIRS, will observe of abx at this time.   Seen on x1 bottle from [place],   -repeat blood cultures sent  Spoke with Caitlin from [employer] from [facility]. Bcx from 1/13 sent to [facility] laboratories. Currently no results. Please call [phone] to follow up.  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

## 2020-01-25 NOTE — H&P ADULT - PROBLEM SELECTOR PLAN 1
unknown if underlying psychiatric issue vs metabolic in setting of hypothyroidism vs infection  -pending behavioral health assessment as patient lethargic 2/2 zyprexa, haldol and versed.   -will call back behavioral health as patient more alert at this time while evaluating for other causes unknown if underlying psychiatric issue vs metabolic in setting of hypothyroidism vs infection  -pending behavioral health assessment as patient lethargic 2/2 zyprexa, haldol and versed.   -will call back behavioral health as patient more alert at this time while evaluating for other causes  -may need LP if work up inconclusive, low suspicion for listeria meningitis at this time. unknown if underlying psychiatric issue vs metabolic in setting of hypothyroidism vs infection  -pending behavioral health assessment as patient lethargic 2/2 zyprexa, haldol and versed.   -will call back behavioral health as patient more alert at this time while evaluating for other causes  -may need LP if work up inconclusive, low suspicion for listeria meningitis at this time.  -urine analysis and drug screen ordered   -patient does not have hypoglycemia, hypothermia or bradycardia, this is not myxedema coma.  -however concerned about compliants +/- absorption  -will start synthroid 75mcg today IV.  -send tpo labs as possible hashimoto encephalopathy (though less likely)  -endo consult  -no need for hydrocortisone at this time.

## 2020-01-25 NOTE — CONSULT NOTE ADULT - ASSESSMENT
Assessment:  63yo woman with hypothyroidism, borderline DM, HTN, CHRIS on CPAP, rheumatoid arthritis, bed bound at baseline 2/2 complication from ?abdominal surgery in the past admitted to [facility] for metabolic derangements with Neurology consult for agitation and question of autoimmune encephalitis. Neurological examination is limited at this time, but patient does not demonstrate a waxing/waning mental status and she is otherwise oriented and does not demonstrate different neurological exam from baseline aside from the fast speech and notable agitation. CTH from prior admission to [facility] negative.     Impression: severe hypothyroidism versus low suspicion of Hashimoto encephalopathy (given intact level of consciousness), low suspicion for autoimmune encephalitis given the known metabolic derangements    Recommendations:  [ ] no further neurological workup at this time  [ ] primary team may consider endocrine consult for the severe hypothyroidism  [ ] rest of workup per primary team    -Management & disposition to be discussed with Neurology Attending Dr. Lujan

## 2020-01-25 NOTE — H&P ADULT - PROBLEM SELECTOR PLAN 5
will continue with prednisone 15mg Slight AG on arrival w/ elevated BHB however received 20U Lantus this AM, unlikely DKA as AG low, w/o acidosis or highly elevated sugars.  -not on SGLT-2 inhibitor, should not have concern for non-ketotic DKA   -unclear why beta hydroxy is high as patient eating, will montior, continue insulin and diet.   -c/w lantus 20U (received in AM) will hold off on given in PM, will start sliding scale for pre-meals, can consider NPH In AM,    -Endocrine will see patient in AM.

## 2020-01-25 NOTE — BEHAVIORAL HEALTH ASSESSMENT NOTE - SUMMARY
64 year old F w/ PMH of HTN, DM, CHRIS on CPAP, RA p/w altered mental status. Patient has no known psychiatric history and change in behavior is acute and occurred 3 weeks ago.    Patient was heavily sedated during interview and exam today and was unable to perform psychiatric assessment. Unclear differential diagnosis at this time.

## 2020-01-25 NOTE — BEHAVIORAL HEALTH ASSESSMENT NOTE - NSBHCHARTREVIEWIMAGING_PSY_A_CORE FT
Recent head CT (January 12, 2020) showed:  FINDINGS:  No acute intracranial hemorrhage, mass effect or midline shift.  No CT evidence of acute large territory vascular infarct.  The ventricles and cortical sulci are prominent reflecting parenchymal volume loss.  Patchy hypodensities in the periventricular white matter are nonspecific, but likely sequela of small vessel ischemic disease.  Intracranial atherosclerotic calcifications are present.    Interval partial opacification of the right sphenoid sinus with hyperdense contents likely related to inspissation; correlate for fungal superinfection. The mastoid air cells are well aerated. The native ocular lenses are surgically absent.  No displaced calvarial fracture.    IMPRESSION:  No acute intracranial hemorrhage or mass effect.    CT A/P done at same time showed no acute changes, but exam was limited.

## 2020-01-25 NOTE — BEHAVIORAL HEALTH ASSESSMENT NOTE - NSBHCONSULTRECOMMENDOTHER_PSY_A_CORE FT
- will need to reevaluate when patient is less sedated  - would recommend avoiding benzodiazepines in this patient

## 2020-01-25 NOTE — H&P ADULT - PROBLEM SELECTOR PLAN 8
Transitions of Care Status:  1.  Name of PCP:  Kristie Nails MD -PCP  2.  PCP Contacted on Admission: [ ] Y    [x] N    3.  PCP contacted at Discharge: [ ] Y    [ ] N    [ ] N/A  4.  Post-Discharge Appointment Date and Location:  5.  Summary of Handoff given to PCP:

## 2020-01-25 NOTE — BEHAVIORAL HEALTH ASSESSMENT NOTE - NSBHCHARTREVIEWLAB_PSY_A_CORE FT
Patient is anemic with hemoglobin 10.0, hematocrit 31.8. CBC otherwise unremarkable. Coags are unconcerning. CMP is remarkable for CO2 of 19. TSH is 101, serum T4 if 1.6. Beta hydroxybutyrate 4.0. Venous pH 7.34. Acetaminophen level, alcohol level, salicylate level WNL.

## 2020-01-25 NOTE — H&P ADULT - NSHPLABSRESULTS_GEN_ALL_CORE
.  Labs reviewed personally.                          10.0   9.49  )-----------( 354      ( 25 Jan 2020 00:57 )             31.8     Hgb Trend: 10.0<--  01-25    137  |  101  |  15  ----------------------------<  213<H>  3.4<L>   |  19<L>  |  0.84    Ca    8.6      25 Jan 2020 08:42  Phos  2.5     01-25  Mg     2.1     01-25    TPro  6.6  /  Alb  3.5  /  TBili  0.1<L>  /  DBili  x   /  AST  14  /  ALT  20  /  AlkPhos  92  01-25    Creatinine Trend: 0.84<--, 0.96<--, 1.61<--  PT/INR - ( 25 Jan 2020 00:57 )   PT: 11.1 sec;   INR: 0.97 ratio         PTT - ( 25 Jan 2020 00:57 )  PTT:25.5 sec    EKG: sinus tach, , QRS 82,   Imaging reviewed personally.  < from: CT Abdomen and Pelvis No Cont (01.12.20 @ 18:20) >    Evaluation of solid organs is limited without intravenous contrast.    LOWER CHEST: Mild bibasilar atelectasis. Small right lower lobe air cyst.    LIVER: Within normal limits.  BILE DUCTS: Normal caliber.  GALLBLADDER: Within normal limits.  SPLEEN: Within normal limits.  PANCREAS: Within normal limits.  ADRENALS: Within normal limits.  KIDNEYS/URETERS: No hydronephrosis, perinephric stranding or obstructing urinary tract calculi. Right renal cyst. Punctate nonobstructing right lower pole stone.    BLADDER: Within normal limits.  REPRODUCTIVE ORGANS: Myomatous uterus.    BOWEL: No bowel obstruction. Appendix is normal. Colonic diverticulosis.  PERITONEUM: No ascites.  VESSELS: Atherosclerotic changes.  RETROPERITONEUM/LYMPH NODES: No lymphadenopathy.    ABDOMINAL WALL: Marked diastases recti with large broad-based ventral hernia containing bowel.  BONES: Degenerative changes. Interval worsening of severe right hip arthrosis with flattening of the femoral head and superior subluxation. Stable appearance of left sacroiliitis.    < end of copied text >    < from: CT Head No Cont (01.12.20 @ 18:20) >    FINDINGS:  No acute intracranial hemorrhage, mass effect or midline shift.  No CT evidence of acute large territory vascular infarct.  The ventricles and cortical sulci are prominent reflecting parenchymal volume loss.  Patchy hypodensities in the periventricular white matter are nonspecific, but likely sequela of small vessel ischemic disease.  Intracranial atherosclerotic calcifications are present.    Interval partial opacification of the right sphenoid sinus with hyperdense contents likely related to inspissation; correlate for fungal superinfection. The mastoid air cells are well aerated. The native ocular lenses are surgically absent.  No displaced calvarial fracture.    IMPRESSION:  No acute intracranial hemorrhage or mass effect.      < end of copied text >
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
## 2020-01-25 NOTE — ED ADULT NURSE REASSESSMENT NOTE - NS ED NURSE REASSESS COMMENT FT1
MD Bull placed peripheral IV in left inner lower leg via ultrasound guidance. 20 gauge in placed, positive blood return, flushes with difficulty. No redness or swelling noted.
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
## 2020-01-25 NOTE — H&P ADULT - ASSESSMENT
64f hx arthritis, hypothyroidism, pulmonary HTN, DM on insulin, COPD/CHRIS on noctournal CPAP and 3L home), RA on chronic prednisone, chronic tremors, SBO s/p ex lap with lysis of adhesions (4 retention sutures) c/b eviscerated 2/2 a coughing episode s/p ex lap (6 retention sutures) c/b multifocal pneumonia, developed hypercapnic respiratory failure requiring intubation 1/6/19, s/p bronch 1/9/19, and extubated on 1/24/19, course further c/b Enterococci bacteremia. P/w agitation 3 weeks of unclear eitiology.

## 2020-01-26 DIAGNOSIS — E03.9 HYPOTHYROIDISM, UNSPECIFIED: ICD-10-CM

## 2020-01-26 DIAGNOSIS — E11.65 TYPE 2 DIABETES MELLITUS WITH HYPERGLYCEMIA: ICD-10-CM

## 2020-01-26 LAB
GLUCOSE BLDC GLUCOMTR-MCNC: 323 MG/DL — HIGH (ref 70–99)
GLUCOSE BLDC GLUCOMTR-MCNC: 366 MG/DL — HIGH (ref 70–99)

## 2020-01-26 PROCEDURE — 99232 SBSQ HOSP IP/OBS MODERATE 35: CPT

## 2020-01-26 PROCEDURE — 99233 SBSQ HOSP IP/OBS HIGH 50: CPT | Mod: GC

## 2020-01-26 PROCEDURE — 99223 1ST HOSP IP/OBS HIGH 75: CPT | Mod: GC

## 2020-01-26 PROCEDURE — 99221 1ST HOSP IP/OBS SF/LOW 40: CPT

## 2020-01-26 RX ORDER — INSULIN LISPRO 100/ML
VIAL (ML) SUBCUTANEOUS AT BEDTIME
Refills: 0 | Status: DISCONTINUED | OUTPATIENT
Start: 2020-01-26 | End: 2020-01-27

## 2020-01-26 RX ORDER — LANOLIN ALCOHOL/MO/W.PET/CERES
3 CREAM (GRAM) TOPICAL AT BEDTIME
Refills: 0 | Status: DISCONTINUED | OUTPATIENT
Start: 2020-01-26 | End: 2020-03-27

## 2020-01-26 RX ORDER — LEVOTHYROXINE SODIUM 125 MCG
100 TABLET ORAL AT BEDTIME
Refills: 0 | Status: DISCONTINUED | OUTPATIENT
Start: 2020-01-26 | End: 2020-01-30

## 2020-01-26 RX ORDER — DEXTROSE 50 % IN WATER 50 %
25 SYRINGE (ML) INTRAVENOUS ONCE
Refills: 0 | Status: DISCONTINUED | OUTPATIENT
Start: 2020-01-26 | End: 2020-03-27

## 2020-01-26 RX ORDER — INSULIN GLARGINE 100 [IU]/ML
26 INJECTION, SOLUTION SUBCUTANEOUS EVERY MORNING
Refills: 0 | Status: DISCONTINUED | OUTPATIENT
Start: 2020-01-26 | End: 2020-01-27

## 2020-01-26 RX ORDER — HALOPERIDOL DECANOATE 100 MG/ML
2 INJECTION INTRAMUSCULAR EVERY 4 HOURS
Refills: 0 | Status: DISCONTINUED | OUTPATIENT
Start: 2020-01-26 | End: 2020-01-26

## 2020-01-26 RX ORDER — HALOPERIDOL DECANOATE 100 MG/ML
2 INJECTION INTRAMUSCULAR EVERY 4 HOURS
Refills: 0 | Status: DISCONTINUED | OUTPATIENT
Start: 2020-01-26 | End: 2020-02-13

## 2020-01-26 RX ORDER — DEXTROSE 50 % IN WATER 50 %
15 SYRINGE (ML) INTRAVENOUS ONCE
Refills: 0 | Status: DISCONTINUED | OUTPATIENT
Start: 2020-01-26 | End: 2020-03-27

## 2020-01-26 RX ORDER — GLUCAGON INJECTION, SOLUTION 0.5 MG/.1ML
1 INJECTION, SOLUTION SUBCUTANEOUS ONCE
Refills: 0 | Status: DISCONTINUED | OUTPATIENT
Start: 2020-01-26 | End: 2020-03-27

## 2020-01-26 RX ORDER — INSULIN LISPRO 100/ML
4 VIAL (ML) SUBCUTANEOUS
Refills: 0 | Status: DISCONTINUED | OUTPATIENT
Start: 2020-01-26 | End: 2020-01-27

## 2020-01-26 RX ORDER — INSULIN LISPRO 100/ML
VIAL (ML) SUBCUTANEOUS
Refills: 0 | Status: DISCONTINUED | OUTPATIENT
Start: 2020-01-26 | End: 2020-01-27

## 2020-01-26 RX ORDER — SODIUM CHLORIDE 9 MG/ML
1000 INJECTION, SOLUTION INTRAVENOUS
Refills: 0 | Status: DISCONTINUED | OUTPATIENT
Start: 2020-01-26 | End: 2020-03-27

## 2020-01-26 RX ORDER — DEXTROSE 50 % IN WATER 50 %
12.5 SYRINGE (ML) INTRAVENOUS ONCE
Refills: 0 | Status: DISCONTINUED | OUTPATIENT
Start: 2020-01-26 | End: 2020-03-27

## 2020-01-26 RX ORDER — INSULIN GLARGINE 100 [IU]/ML
26 INJECTION, SOLUTION SUBCUTANEOUS ONCE
Refills: 0 | Status: COMPLETED | OUTPATIENT
Start: 2020-01-26 | End: 2020-01-27

## 2020-01-26 RX ORDER — TRAMADOL HYDROCHLORIDE 50 MG/1
100 TABLET ORAL ONCE
Refills: 0 | Status: DISCONTINUED | OUTPATIENT
Start: 2020-01-26 | End: 2020-01-26

## 2020-01-26 RX ADMIN — Medication 137 MICROGRAM(S): at 00:27

## 2020-01-26 RX ADMIN — TRAMADOL HYDROCHLORIDE 100 MILLIGRAM(S): 50 TABLET ORAL at 00:57

## 2020-01-26 RX ADMIN — TRAMADOL HYDROCHLORIDE 100 MILLIGRAM(S): 50 TABLET ORAL at 00:27

## 2020-01-26 RX ADMIN — Medication 3 MILLIGRAM(S): at 04:16

## 2020-01-26 RX ADMIN — Medication 4 UNIT(S): at 14:23

## 2020-01-26 RX ADMIN — HALOPERIDOL DECANOATE 2 MILLIGRAM(S): 100 INJECTION INTRAMUSCULAR at 14:27

## 2020-01-26 RX ADMIN — Medication 5: at 14:23

## 2020-01-26 RX ADMIN — SIMVASTATIN 20 MILLIGRAM(S): 20 TABLET, FILM COATED ORAL at 20:10

## 2020-01-26 RX ADMIN — SIMVASTATIN 20 MILLIGRAM(S): 20 TABLET, FILM COATED ORAL at 00:28

## 2020-01-26 NOTE — PROGRESS NOTE BEHAVIORAL HEALTH - NSBHCONSULTRECOMMENDOTHER_PSY_A_CORE FT
- consider Ortho consultation: CT showing interval worsening of severe right hip arthrosis with flattening of the femoral head and superior subluxation --> pain --> agitation     - ensure the diastesis recti with large ventral hernia w/ bowels is reduceable and not an active issue which is contributing to Pt's agitation
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
## 2020-01-26 NOTE — PROGRESS NOTE ADULT - PROBLEM SELECTOR PLAN 4
c/w Sildenafil 20mg TiD, c/w metoprolol, Echo showing mild pulmonary HTN  -need to follow up with pulmonologist on Monday to clarify as unclear why on pulm HTN treatment with mild pulm HTN  - - Moderate pulmonary HTN per TTE (1/13)  - C/w Sildenafil 20mg TID, metoprolol  - Will contact PMD/Pulmonologist regarding regimen

## 2020-01-26 NOTE — PROGRESS NOTE ADULT - PROBLEM SELECTOR PLAN 3
Patient does not meet qSOFA scoring nor SIRS, will observe of abx at this time.   Seen on x1 bottle from [place],   -repeat blood cultures sent  Spoke with Caitlin from [employer] [facility] from [facility]. Bcx from 1/13 sent to [facility] laboratories. Currently no results. Please call [phone] to follow up.  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 - Bcx + for GPR on 1/12 per patient records   - On presentation, tachycardia, otherwise not meeting SIRS criteria  - +Bcx from 1/12 sent to [facility] laboratories, currently no results; will call [phone] to f/u  - Will monitor off Abx

## 2020-01-26 NOTE — CONSULT NOTE ADULT - SUBJECTIVE AND OBJECTIVE BOX
HPI:  64 year old female with history of functional paraplegia, hypothyroidism, pulmonary hypertension, COPD, RA on chronic prednisone, SBO s/p ex lap with lysis of adhesions c/b multifocal pneumonia, developed hypercapnic respiratory failure requiring intubation 2019 and extubated on 2019.   Patient is aggressive, agitated and not in the mental status to answer questions.   I spoke to son Norris over the phone to obtain information.   Patients behavior was first noted to change 3 weeks ago.     In the ER patient agitated requiring zyprexa total 20mg, haloperidol 10, versed 8mg    Patient was taken to [facility] ([place]) for evaluation; was evaluated by psych and deemed to have capacity to leave AMA. Patient w/ elevated WBC and SAM; also later blood culture x1 bottle significant for gram Positive rods was sent to labatory outside hospital system for speciation.     On admission, noted to have a TSH of 100 and TT4 of 1.6, 2 weeks prior her TSH was 26  Son reported that she takes levothyroxine 137 mcg at home, but for the past week was unable to give to her because of her aggressive behavior     In terms of DM  -Long term history of diabetes   -At home she takes Lantus 20 units at bedtime   -Novolog 2-4 units before meals   -Blood glucose levels are 290-334   -No hypoglycemic episodes in the past   -history of cataracts, no recent optho visits  -She has not complained on neuropathy in the past         PAST MEDICAL & SURGICAL HISTORY:  Diverticulosis  RA (Rheumatoid Arthritis)  Tooth Abscess  Arthritis  Cigarette Smoker  Chronic Asthma  Adult Hypothyroidism  Borderline Diabetes Mellitus  High Cholesterol  H/O: HTN  Wrist Disorder: S/P Surgery  History of  Section      FAMILY HISTORY:  No pertinent family history in first degree relatives      Social History: Lives at home with son     Outpatient Medications:    MEDICATIONS  (STANDING):  dextrose 5%. 1000 milliLiter(s) (50 mL/Hr) IV Continuous <Continuous>  dextrose 50% Injectable 12.5 Gram(s) IV Push once  dextrose 50% Injectable 25 Gram(s) IV Push once  dextrose 50% Injectable 25 Gram(s) IV Push once  enoxaparin Injectable 40 milliGRAM(s) SubCutaneous every 12 hours  influenza   Vaccine 0.5 milliLiter(s) IntraMuscular once  insulin glargine Injectable (LANTUS) 26 Unit(s) SubCutaneous every morning  insulin glargine Injectable (LANTUS) 26 Unit(s) SubCutaneous once  insulin lispro (HumaLOG) corrective regimen sliding scale   SubCutaneous three times a day before meals  insulin lispro (HumaLOG) corrective regimen sliding scale   SubCutaneous at bedtime  insulin lispro Injectable (HumaLOG) 4 Unit(s) SubCutaneous three times a day before meals  levothyroxine 137 MICROGram(s) Oral at bedtime  metoprolol succinate ER 50 milliGRAM(s) Oral daily  pantoprazole    Tablet 40 milliGRAM(s) Oral before breakfast  predniSONE   Tablet 15 milliGRAM(s) Oral daily  primidone 50 milliGRAM(s) Oral at bedtime  sildenafil (REVATIO) 20 milliGRAM(s) Oral three times a day  simvastatin 20 milliGRAM(s) Oral at bedtime    MEDICATIONS  (PRN):  dextrose 40% Gel 15 Gram(s) Oral once PRN Blood Glucose LESS THAN 70 milliGRAM(s)/deciLiter  glucagon  Injectable 1 milliGRAM(s) IntraMuscular once PRN Glucose <70 milliGRAM(s)/deciLiter  haloperidol    Injectable 2 milliGRAM(s) IntraMuscular every 4 hours PRN Agitation  haloperidol    Injectable 2 milliGRAM(s) IV Push every 4 hours PRN Agitation  melatonin 3 milliGRAM(s) Oral at bedtime PRN Insomnia      Allergies    No Known Allergies    Intolerances    Seroquel (Other)    Review of Systems:  UNABLE TO OBTAIN BECAUSE OF MENTAL STATUS CHANGES     PHYSICAL EXAM:  GENERAL: +Obese +Very agitated ( physical exam limited)   HEENT:  Atraumatic, Normocephalic, moist mucous membranes  RESPIRATORY: Clear to auscultation bilaterally; No rales, rhonchi, wheezing, or rubs  CARDIOVASCULAR: Regular rate and rhythm; No murmurs; no peripheral edema  GI: Soft, nontender, non distended, normal bowel sounds  SKIN: Dry, intact, No rashes or lesions +IV in the foot   NEURO: unable to assess     POCT Blood Glucose.: 366 mg/dL (20 @ 14:04)  POCT Blood Glucose.: 323 mg/dL (20 @ 10:09)  POCT Blood Glucose.: 210 mg/dL (20 @ 06:04)  POCT Blood Glucose.: 317 mg/dL (20 @ 12:10)                            10.0   9.49  )-----------( 354      ( 2020 00:57 )             31.8           137  |  101  |  15  ----------------------------<  213<H>  3.4<L>   |  19<L>  |  0.84    EGFR if : 86  EGFR if non : 74    Ca    8.6        Mg     2.0       Phos  2.2         TPro  6.6  /  Alb  3.5  /  TBili  0.1<L>  /  DBili  x   /  AST  14  /  ALT  20  /  AlkPhos  92        Thyroid Function Tests:   @ 01:52 .00 FreeT4 -- T3 24 Anti TPO -- Anti Thyroglobulin Ab -- TSI --   @ 14:38 TSH 29.100 FreeT4 -- T3 -- Anti TPO -- Anti Thyroglobulin Ab -- TSI --

## 2020-01-26 NOTE — PROVIDER CONTACT NOTE (CHANGE IN STATUS NOTIFICATION) - SITUATION
Pt. refusing AM Lab work, Vital signs, and morning medications. Pt. also refused overnight BiPap and continuous pulse ox.

## 2020-01-26 NOTE — PROGRESS NOTE ADULT - PROBLEM SELECTOR PLAN 1
-   - No clinical evidence of myxedema coma (hypoglycemia, hypothermia, or bradycardia)  -however concerned about compliants +/- absorption  - C/w synthroid 75mcg  - F/u TPO to assess for Hashimoto encephalopathy (though unlikely)  - F/u endo recs -   - No clinical evidence of myxedema coma (hypoglycemia, hypothermia, or bradycardia), though patient refusing vital signs  - C/w Synthroid 137  - F/u TPO to assess for Hashimoto encephalopathy if patient allows  - F/u Endocrinology recs

## 2020-01-26 NOTE — PROGRESS NOTE ADULT - ATTENDING COMMENTS
Agree with above with followign addendum:    #Subacute encephalopathy  -patient with paranoia, confusion, obstinate. I personally sat down with her for 25 minutes she was fixated on "chief of medicine" who saw her in ED and said she had a hernia, on her insurance and paying 8k a day to be here, and on someone stealing 30k in her house with her son not noticing (said it was a HHA).   -CT negative, will r/o encephalopathy from metabolic cause such as hashimoto encephalopathy, but less likely  -will likely need MR/EEG/LP if not improved given acuity of symptoms.     #hypothyroid  -Iv synthroid  -not in myxedema coma, no need for hydrocortisone    #?pulm HTN  -only mild on prior TTE, buit on revatio  -need to discuss with outpatient provider during week for how is on pulm HTN meds    #hx hypercarbic respiratory failure  -likely in obesity hyopventilation syndrome, continue with non-invasive ventilation QHS    patient is refusing most medications and drugs.  -at this time even without consent cannot go against assent unless life threatening or danger to herself or others. At this time that would include IV synthroid and insulin  -will not persue blood work if refuses at this time as not life threatening  -pending tests may need MR with anesthesia +/- LP

## 2020-01-26 NOTE — PROGRESS NOTE ADULT - PROBLEM SELECTOR PLAN 7
DVT ppx w/ lovenox SQ  DASH diet w/ evening snack - DVT ppx: Lovenox  - Diet: DASH diet w/evening snack

## 2020-01-26 NOTE — CONSULT NOTE ADULT - ASSESSMENT
64 year old female with history of functional paraplegia, hypothyroidism, pulmonary hypertension, COPD, RA on chronic prednisone, SBO s/p ex lap with lysis of adhesions c/b multifocal pneumonia consulted for inpatient hypothyroidism management and DM Management.  Treatment has been limited since the patient has been refusing insulin and oral medications

## 2020-01-26 NOTE — PROGRESS NOTE BEHAVIORAL HEALTH - OTHER
weakness in lower extremities LUE tremor at rest bed bound unable to assess does not answer suspiciousness unable to assess- not cooperative

## 2020-01-26 NOTE — PROGRESS NOTE ADULT - PROBLEM SELECTOR PLAN 5
Slight AG on arrival w/ elevated BHB however received 20U Lantus this AM, unlikely DKA as AG low, w/o acidosis or highly elevated sugars.  -not on SGLT-2 inhibitor, should not have concern for non-ketotic DKA   -unclear why beta hydroxy is high as patient eating, will montior, continue insulin and diet.   -c/w lantus 20U (received in AM) will hold off on given in PM, will start sliding scale for pre-meals, can consider NPH In AM,    -Endocrine will see patient in AM. -  with slight AG (18), BHB 4 (elevated) on presentation; had received insulin prior to presentation  - Low suspicion for DKA as labs not suggestive of it; etiology of ketones unclear  - Lantus 26U + Humalog 4 TID + ISS  - F/u Endocrine recs

## 2020-01-26 NOTE — PROGRESS NOTE ADULT - ASSESSMENT
64f hx arthritis, hypothyroidism, pulmonary HTN, DM on insulin, COPD/CHRIS on noctournal CPAP and 3L home), RA on chronic prednisone, chronic tremors, SBO s/p ex lap with lysis of adhesions (4 retention sutures) c/b eviscerated 2/2 a coughing episode s/p ex lap (6 retention sutures) c/b multifocal pneumonia, developed hypercapnic respiratory failure requiring intubation 1/6/19, s/p bronch 1/9/19, and extubated on 1/24/19, course further c/b Enterococci bacteremia. P/w agitation 3 weeks of unclear eitiology. 64f hx arthritis, hypothyroidism, pulmonary HTN, DM on insulin, COPD/CHRIS on noctournal CPAP and 3L home), RA on chronic prednisone, chronic tremors, SBO s/p ex lap with lysis of adhesions (4 retention sutures) c/b eviscerated 2/2 a coughing episode s/p ex lap (6 retention sutures) c/b multifocal pneumonia, developed hypercapnic respiratory failure (requiring intubation 1/6/19, s/p bronch 1/9/19, and extubated on 1/24/19, course further c/b Enterococci bacteremia), p/w agitation 3 weeks of unclear etiology, found to be significantly hypothyroid (), extremely combative and paranoid of family and healthcare staff. Refusing all interventions.

## 2020-01-26 NOTE — PROGRESS NOTE ADULT - PROBLEM SELECTOR PLAN 8
Transitions of Care Status:  1.  Name of PCP:  Kristie Nails MD (PCP)  2.  PCP Contacted on Admission: [ ] Y    [x] N    3.  PCP contacted at Discharge: [ ] Y    [ ] N    [ ] N/A  4.  Post-Discharge Appointment Date and Location:  5.  Summary of Handoff given to PCP:

## 2020-01-27 DIAGNOSIS — G93.40 ENCEPHALOPATHY, UNSPECIFIED: ICD-10-CM

## 2020-01-27 DIAGNOSIS — A49.9 BACTERIAL INFECTION, UNSPECIFIED: ICD-10-CM

## 2020-01-27 LAB
24R-OH-CALCIDIOL SERPL-MCNC: 27.4 NG/ML — LOW (ref 30–80)
ALBUMIN SERPL ELPH-MCNC: 4 G/DL — SIGNIFICANT CHANGE UP (ref 3.3–5)
ALP SERPL-CCNC: 89 U/L — SIGNIFICANT CHANGE UP (ref 40–120)
ALT FLD-CCNC: 18 U/L — SIGNIFICANT CHANGE UP (ref 10–45)
ANION GAP SERPL CALC-SCNC: 17 MMOL/L — SIGNIFICANT CHANGE UP (ref 5–17)
APTT BLD: 25.6 SEC — LOW (ref 27.5–36.3)
AST SERPL-CCNC: 15 U/L — SIGNIFICANT CHANGE UP (ref 10–40)
B-OH-BUTYR SERPL-SCNC: 1.4 MMOL/L — HIGH
BASOPHILS # BLD AUTO: 0.02 K/UL — SIGNIFICANT CHANGE UP (ref 0–0.2)
BASOPHILS NFR BLD AUTO: 0.2 % — SIGNIFICANT CHANGE UP (ref 0–2)
BILIRUB SERPL-MCNC: 0.1 MG/DL — LOW (ref 0.2–1.2)
BUN SERPL-MCNC: 11 MG/DL — SIGNIFICANT CHANGE UP (ref 7–23)
CALCIUM SERPL-MCNC: 9.3 MG/DL — SIGNIFICANT CHANGE UP (ref 8.4–10.5)
CHLORIDE SERPL-SCNC: 104 MMOL/L — SIGNIFICANT CHANGE UP (ref 96–108)
CHOLEST SERPL-MCNC: 307 MG/DL — HIGH (ref 10–199)
CO2 SERPL-SCNC: 19 MMOL/L — LOW (ref 22–31)
CREAT SERPL-MCNC: 0.92 MG/DL — SIGNIFICANT CHANGE UP (ref 0.5–1.3)
CRP SERPL-MCNC: 4.87 MG/DL — HIGH (ref 0–0.4)
EOSINOPHIL # BLD AUTO: 0 K/UL — SIGNIFICANT CHANGE UP (ref 0–0.5)
EOSINOPHIL NFR BLD AUTO: 0 % — SIGNIFICANT CHANGE UP (ref 0–6)
ERYTHROCYTE [SEDIMENTATION RATE] IN BLOOD: 74 MM/HR — HIGH (ref 0–20)
FERRITIN SERPL-MCNC: 46 NG/ML — SIGNIFICANT CHANGE UP (ref 15–150)
FOLATE SERPL-MCNC: >20 NG/ML — SIGNIFICANT CHANGE UP
GAS PNL BLDV: SIGNIFICANT CHANGE UP
GLUCOSE BLDC GLUCOMTR-MCNC: 229 MG/DL — HIGH (ref 70–99)
GLUCOSE BLDC GLUCOMTR-MCNC: 262 MG/DL — HIGH (ref 70–99)
GLUCOSE BLDC GLUCOMTR-MCNC: 304 MG/DL — HIGH (ref 70–99)
GLUCOSE BLDC GLUCOMTR-MCNC: 344 MG/DL — HIGH (ref 70–99)
GLUCOSE BLDC GLUCOMTR-MCNC: 410 MG/DL — HIGH (ref 70–99)
GLUCOSE BLDC GLUCOMTR-MCNC: 461 MG/DL — CRITICAL HIGH (ref 70–99)
GLUCOSE SERPL-MCNC: 353 MG/DL — HIGH (ref 70–99)
HBA1C BLD-MCNC: 9.2 % — HIGH (ref 4–5.6)
HCT VFR BLD CALC: 30 % — LOW (ref 34.5–45)
HDLC SERPL-MCNC: 50 MG/DL — SIGNIFICANT CHANGE UP
HGB BLD-MCNC: 9.3 G/DL — LOW (ref 11.5–15.5)
HIV 1+2 AB+HIV1 P24 AG SERPL QL IA: SIGNIFICANT CHANGE UP
IMM GRANULOCYTES NFR BLD AUTO: 0.9 % — SIGNIFICANT CHANGE UP (ref 0–1.5)
INR BLD: 0.94 RATIO — SIGNIFICANT CHANGE UP (ref 0.88–1.16)
IRON SATN MFR SERPL: 12 % — LOW (ref 14–50)
IRON SATN MFR SERPL: 44 UG/DL — SIGNIFICANT CHANGE UP (ref 30–160)
LACTATE BLDV-MCNC: 2.1 MMOL/L — HIGH (ref 0.7–2)
LIPID PNL WITH DIRECT LDL SERPL: 187 MG/DL — HIGH
LYMPHOCYTES # BLD AUTO: 1.77 K/UL — SIGNIFICANT CHANGE UP (ref 1–3.3)
LYMPHOCYTES # BLD AUTO: 15.9 % — SIGNIFICANT CHANGE UP (ref 13–44)
MAGNESIUM SERPL-MCNC: 2.1 MG/DL — SIGNIFICANT CHANGE UP (ref 1.6–2.6)
MCHC RBC-ENTMCNC: 30.6 PG — SIGNIFICANT CHANGE UP (ref 27–34)
MCHC RBC-ENTMCNC: 31 GM/DL — LOW (ref 32–36)
MCV RBC AUTO: 98.7 FL — SIGNIFICANT CHANGE UP (ref 80–100)
MONOCYTES # BLD AUTO: 0.68 K/UL — SIGNIFICANT CHANGE UP (ref 0–0.9)
MONOCYTES NFR BLD AUTO: 6.1 % — SIGNIFICANT CHANGE UP (ref 2–14)
NEUTROPHILS # BLD AUTO: 8.58 K/UL — HIGH (ref 1.8–7.4)
NEUTROPHILS NFR BLD AUTO: 76.9 % — SIGNIFICANT CHANGE UP (ref 43–77)
NRBC # BLD: 0 /100 WBCS — SIGNIFICANT CHANGE UP (ref 0–0)
PHOSPHATE SERPL-MCNC: 1.2 MG/DL — LOW (ref 2.5–4.5)
PLATELET # BLD AUTO: 327 K/UL — SIGNIFICANT CHANGE UP (ref 150–400)
POTASSIUM SERPL-MCNC: 4.2 MMOL/L — SIGNIFICANT CHANGE UP (ref 3.5–5.3)
POTASSIUM SERPL-SCNC: 4.2 MMOL/L — SIGNIFICANT CHANGE UP (ref 3.5–5.3)
PROT SERPL-MCNC: 7 G/DL — SIGNIFICANT CHANGE UP (ref 6–8.3)
PROTHROM AB SERPL-ACNC: 10.7 SEC — SIGNIFICANT CHANGE UP (ref 10–12.9)
RBC # BLD: 3.04 M/UL — LOW (ref 3.8–5.2)
RBC # FLD: 18.5 % — HIGH (ref 10.3–14.5)
RHEUMATOID FACT SERPL-ACNC: 518 IU/ML — HIGH (ref 0–13)
SODIUM SERPL-SCNC: 140 MMOL/L — SIGNIFICANT CHANGE UP (ref 135–145)
T PALLIDUM AB TITR SER: NEGATIVE — SIGNIFICANT CHANGE UP
T3 SERPL-MCNC: 29 NG/DL — LOW (ref 80–200)
T4 AB SER-ACNC: 3.3 UG/DL — LOW (ref 4.6–12)
T4 FREE SERPL-MCNC: 0.3 NG/DL — LOW (ref 0.9–1.8)
TIBC SERPL-MCNC: 359 UG/DL — SIGNIFICANT CHANGE UP (ref 220–430)
TOTAL CHOLESTEROL/HDL RATIO MEASUREMENT: 6.1 RATIO — SIGNIFICANT CHANGE UP (ref 3.3–7.1)
TRIGL SERPL-MCNC: 352 MG/DL — HIGH (ref 10–149)
TSH SERPL-MCNC: 52.3 UIU/ML — HIGH (ref 0.27–4.2)
UIBC SERPL-MCNC: 315 UG/DL — SIGNIFICANT CHANGE UP (ref 110–370)
VIT B12 SERPL-MCNC: 808 PG/ML — SIGNIFICANT CHANGE UP (ref 232–1245)
WBC # BLD: 11.15 K/UL — HIGH (ref 3.8–10.5)
WBC # FLD AUTO: 11.15 K/UL — HIGH (ref 3.8–10.5)

## 2020-01-27 PROCEDURE — 99233 SBSQ HOSP IP/OBS HIGH 50: CPT

## 2020-01-27 PROCEDURE — 99232 SBSQ HOSP IP/OBS MODERATE 35: CPT

## 2020-01-27 PROCEDURE — 99233 SBSQ HOSP IP/OBS HIGH 50: CPT | Mod: GC

## 2020-01-27 RX ORDER — QUETIAPINE FUMARATE 200 MG/1
25 TABLET, FILM COATED ORAL THREE TIMES A DAY
Refills: 0 | Status: DISCONTINUED | OUTPATIENT
Start: 2020-01-27 | End: 2020-01-27

## 2020-01-27 RX ORDER — INSULIN LISPRO 100/ML
8 VIAL (ML) SUBCUTANEOUS
Refills: 0 | Status: DISCONTINUED | OUTPATIENT
Start: 2020-01-27 | End: 2020-01-28

## 2020-01-27 RX ORDER — INSULIN LISPRO 100/ML
VIAL (ML) SUBCUTANEOUS AT BEDTIME
Refills: 0 | Status: DISCONTINUED | OUTPATIENT
Start: 2020-01-27 | End: 2020-01-28

## 2020-01-27 RX ORDER — INSULIN LISPRO 100/ML
VIAL (ML) SUBCUTANEOUS
Refills: 0 | Status: DISCONTINUED | OUTPATIENT
Start: 2020-01-27 | End: 2020-02-03

## 2020-01-27 RX ORDER — POTASSIUM PHOSPHATE, MONOBASIC POTASSIUM PHOSPHATE, DIBASIC 236; 224 MG/ML; MG/ML
30 INJECTION, SOLUTION INTRAVENOUS ONCE
Refills: 0 | Status: COMPLETED | OUTPATIENT
Start: 2020-01-27 | End: 2020-01-28

## 2020-01-27 RX ORDER — LEVOTHYROXINE SODIUM 125 MCG
100 TABLET ORAL ONCE
Refills: 0 | Status: COMPLETED | OUTPATIENT
Start: 2020-01-27 | End: 2020-01-27

## 2020-01-27 RX ORDER — INSULIN GLARGINE 100 [IU]/ML
30 INJECTION, SOLUTION SUBCUTANEOUS EVERY MORNING
Refills: 0 | Status: DISCONTINUED | OUTPATIENT
Start: 2020-01-27 | End: 2020-02-05

## 2020-01-27 RX ADMIN — Medication 4: at 09:50

## 2020-01-27 RX ADMIN — Medication 4: at 19:19

## 2020-01-27 RX ADMIN — Medication 4: at 13:58

## 2020-01-27 RX ADMIN — INSULIN GLARGINE 26 UNIT(S): 100 INJECTION, SOLUTION SUBCUTANEOUS at 09:49

## 2020-01-27 RX ADMIN — Medication 100 MICROGRAM(S): at 12:26

## 2020-01-27 RX ADMIN — HALOPERIDOL DECANOATE 2 MILLIGRAM(S): 100 INJECTION INTRAMUSCULAR at 12:27

## 2020-01-27 RX ADMIN — Medication 4 UNIT(S): at 13:58

## 2020-01-27 RX ADMIN — Medication 4 UNIT(S): at 09:51

## 2020-01-27 RX ADMIN — Medication 15 MILLIGRAM(S): at 06:07

## 2020-01-27 RX ADMIN — Medication 8 UNIT(S): at 19:20

## 2020-01-27 NOTE — PROGRESS NOTE ADULT - PROBLEM SELECTOR PLAN 5
-  with slight AG (18), BHB 4 (elevated) on presentation; had received insulin prior to presentation  - Low suspicion for DKA as labs not suggestive of it; etiology of ketones unclear  - Lantus 26U + Humalog 4 TID + ISS  - F/u Endocrine recs -  with slight AG (18), BHB 4 (elevated) on presentation; had received insulin prior to presentation  - Low suspicion for DKA as labs not suggestive of it; etiology of ketones unclear  - Per Endocrine, increase Lantus to 30U, Humalog to 8 TID, increase ISS from low to moderate

## 2020-01-27 NOTE — PROGRESS NOTE ADULT - ASSESSMENT
64f hx arthritis, hypothyroidism, pulmonary HTN, DM on insulin, COPD/CHRIS on noctournal CPAP and 3L home), RA on chronic prednisone, chronic tremors, SBO s/p ex lap with lysis of adhesions (4 retention sutures) c/b eviscerated 2/2 a coughing episode s/p ex lap (6 retention sutures) c/b multifocal pneumonia, developed hypercapnic respiratory failure (requiring intubation 1/6/19, s/p bronch 1/9/19, and extubated on 1/24/19, course further c/b Enterococci bacteremia), p/w agitation 3 weeks of unclear etiology, found to be significantly hypothyroid (), extremely combative and paranoid of family and healthcare staff. Refusing all interventions.

## 2020-01-27 NOTE — CONSULT NOTE ADULT - ASSESSMENT
Impression:    Abdominal surgical wound - chronic     Recommend:   1.) topical therapy: abd wound - cleanse with NS, pat dry, apply adaptic, cover with aquacel, secure with tegederm every 3 days  2.) Nutrition consult for optimization    Care as per medicine will follow w/ you  Upon discharge [facility] at [facility] [address] [place] [address] [phone]  Seen with Dr. Lowe and discussed with clinical nurse  Thank you for this consult  Lori Burton, NP-C, CWOCN [id number]

## 2020-01-27 NOTE — PROGRESS NOTE ADULT - PROBLEM SELECTOR PLAN 2
no hgb a1c, no repeat blood work due to refusal.   -increase to lantus 30 units qAM   -increase humalog to 8 units tidac   -increase sliding scale to moderate dose sliding scale tidac and qhs     Discharge  basal/bolus TBD no hgb a1c, no repeat blood work due to refusal.   -increase to lantus 30 units qAM   -increase humalog to 8 units tidac   -increase sliding scale to moderate dose sliding scale tidac and qhs     Discharge  basal/bolus TBD    Pager  from 9-5PM. After hours and on weekends please call [phone] no hgb a1c, no repeat blood work due to refusal.   -increase to lantus 30 units qAM   -increase humalog to 8 units tidac   -increase sliding scale to moderate dose sliding scale tidac and qhs   -add on hgb a1c     Discharge  basal/bolus TBD    Pager  from 9-5PM. After hours and on weekends please call [phone] no hgb a1c, no repeat blood work due to refusal.   -increase to lantus 30 units qAM   -increase humalog to 8 units tidac   -increase sliding scale to moderate dose sliding scale tidac and qhs   -add on hgb a1c     Discharge  basal/bolus TBD  Dr. Huseyin Traore, endocrine fellow  Pager  from 9-5PM. After hours and on weekends please call [phone]

## 2020-01-27 NOTE — CONSULT NOTE ADULT - SUBJECTIVE AND OBJECTIVE BOX
Wound Surgery Consult Note:    HPI:  64f hx functional paraplegia bedbound, hypothyroidism, pulmonary HTN, DM on insulin, COPD/CHRIS on noctournal CPAP and 3L home), RA on chronic prednisone, chronic tremors, SBO s/p ex lap with lysis of adhesions (4 retention sutures) c/b eviscerated 2/2 a coughing episode s/p ex lap (6 retention sutures) c/b multifocal pneumonia, developed hypercapnic respiratory failure requiring intubation 19, s/p bronch 19, and extubated on 19, course further c/b Enterococci bacteremia.    Patient unreliable historian, history obtained from Son Norris(lives w/ patient) and Daughter who claim to be HCP. Patient's behavior was first noted to change 3weeks ago. She was accusatory of family for 'stealing $180k from her closet", repeatedly calling back restaurants of which she ordered from claiming she never received deliver when she did, also called police on multiple occasions claiming son was trying to hurt her as well as accusing him of "stealing her oxygen". Patient has fired multiple HHAs claiming they were not useful for her needs. patient's son has also noticed she would drink about "1-2 gallons" of water a day however did not notice change in urination habits. Also c/o constipation, of which son had given OTC meds to help, patient had 1 "baseball size, hard stool" after straining w/o blood described as brown in color. Recently has been spitting at HHAs and caretaker son, throwing her bedpan filled with urine and feces at them.     In the ER patient agitated requiring zyprexa total 20mg, haloperidol 10, versed 8mg    Patient was taken to [facility] for evaluation; was evaluated by psych and deemed to have capacity to leave AMA. Patient w/ elevated WBC and SAM; also later blood culture x1 bottle significant for gram Positive rods was sent to labatory outside hospital system for speciation. (2020 11:56)    Ms. Morrow was encountered on an alternating air with low air loss surface arguing with the PCA assigned to her. Her sister, Edie was at the bedside as well as a PCA. Upon entering the room, she was telling the PCA that he didn't "need to be there". She is known to Dr. Lowe from a previous admission. She perseverated on the issue of her being on the 5th floor instead of the 4th floor. She perceives the 5th floor as a "psych" hernandez. She did remember Dr. Lowe from his visit in April and was happy for him to examine her.    PAST MEDICAL & SURGICAL HISTORY:  Diverticulosis  RA (Rheumatoid Arthritis)  Tooth Abscess  Arthritis  Cigarette Smoker  Chronic Asthma  Adult Hypothyroidism  Borderline Diabetes Mellitus  High Cholesterol  H/O: HTN  Wrist Disorder: S/P Surgery  History of  Section    REVIEW OF SYSTEMS  General:	no fever or chills, change in behavior  Respiratory and Thorax: no SOB or cough	  Cardiovascular:	no CP  Gastrointestinal:	+  constipation, h/o multiple ex lap with lysis of adhesions  Genitourinary: no dysuria	  Musculoskeletal:	 bedbound  Neurological:	no LOC  Endocrine:	DM  Immunologic:	RA  Skin: chronic abd surgical wound    MEDICATIONS  (STANDING):  dextrose 5%. 1000 milliLiter(s) (50 mL/Hr) IV Continuous <Continuous>  dextrose 50% Injectable 12.5 Gram(s) IV Push once  dextrose 50% Injectable 25 Gram(s) IV Push once  dextrose 50% Injectable 25 Gram(s) IV Push once  enoxaparin Injectable 40 milliGRAM(s) SubCutaneous every 12 hours  influenza   Vaccine 0.5 milliLiter(s) IntraMuscular once  insulin glargine Injectable (LANTUS) 30 Unit(s) SubCutaneous every morning  insulin lispro (HumaLOG) corrective regimen sliding scale   SubCutaneous three times a day before meals  insulin lispro (HumaLOG) corrective regimen sliding scale   SubCutaneous at bedtime  insulin lispro Injectable (HumaLOG) 4 Unit(s) SubCutaneous three times a day before meals  levothyroxine Injectable 100 MICROGram(s) IV Push at bedtime  metoprolol succinate ER 50 milliGRAM(s) Oral daily  pantoprazole    Tablet 40 milliGRAM(s) Oral before breakfast  predniSONE   Tablet 15 milliGRAM(s) Oral daily  primidone 50 milliGRAM(s) Oral at bedtime  QUEtiapine 25 milliGRAM(s) Oral three times a day  sildenafil (REVATIO) 20 milliGRAM(s) Oral three times a day  simvastatin 20 milliGRAM(s) Oral at bedtime    MEDICATIONS  (PRN):  dextrose 40% Gel 15 Gram(s) Oral once PRN Blood Glucose LESS THAN 70 milliGRAM(s)/deciLiter  glucagon  Injectable 1 milliGRAM(s) IntraMuscular once PRN Glucose <70 milliGRAM(s)/deciLiter  haloperidol    Injectable 2 milliGRAM(s) IntraMuscular every 4 hours PRN Agitation  haloperidol    Injectable 2 milliGRAM(s) IV Push every 4 hours PRN Agitation  melatonin 3 milliGRAM(s) Oral at bedtime PRN Insomnia    Allergies    No Known Allergies    Intolerances    Seroquel (Other)    SOCIAL HISTORY: lives with son and daughter; smoker,     FAMILY HISTORY:  No pertinent family history in first degree relatives    Physical Exam:  General: NAD, Obese,  Respiratory: no SOB on room air  Gastrointestinal: soft, post op abdomen  Neurology: +sensation grossly intact, however could not move BLE, + movement of BUE  Musculoskeletal: deformities of Bilateral hands with contracted fingers and extended wrists,  Vascular: no BLE edema, DP/PT pulses palpable, BLE equally warm, no acute ischemia noted  Skin:  abdominal surgical wound 95% healed, with scattered small, superficial denuded areas, + keloids and scar tissue, scant serosanguinous drainage  No odor, erythema, increased warmth, tenderness, induration, fluctuance    LABS:             10.0   9.49  )-----------( 354      ( 2020 00:57 )             31.8     PT/INR - ( 2020 00:57 )   PT: 11.1 sec;   INR: 0.97 ratio         PTT - ( 2020 00:57 )  PTT:25.5 sec      137  |  101  |  15  ----------------------------<  213<H>  3.4<L>   |  19<L>  |  0.84    Ca    8.6      2020 08:42  Phos  2.2       Mg     2.0         TPro  6.6  /  Alb  3.5  /  TBili  0.1<L>  /  DBili  x   /  AST  14  /  ALT  20  /  AlkPhos  92          LIVER FUNCTIONS - ( 2020 00:57 )  Alb: 3.5 g/dL / Pro: 6.6 g/dL / ALK PHOS: 92 U/L / ALT: 20 U/L / AST: 14 U/L / GGT: x        RADIOLOGY & ADDITIONAL STUDIES:  EXAM:  CT ABDOMEN AND PELVIS                        PROCEDURE DATE:  2020    INTERPRETATION:  CLINICAL INFORMATION: Sepsis.    COMPARISON: 4/3/2019    PROCEDURE:   CT of the Abdomen and Pelvis was performed without intravenous contrast.   Intravenous contrast: None.  Oral contrast: None.  Sagittal and coronal reformats were performed.    FINDINGS:  Evaluation of solid organs is limited without intravenous contrast.    LOWER CHEST: Mild bibasilar atelectasis. Small right lower lobe air cyst.    LIVER: Within normal limits.  BILE DUCTS: Normal caliber.  GALLBLADDER: Within normal limits.  SPLEEN: Within normal limits.  PANCREAS: Within normal limits.  ADRENALS: Within normal limits.  KIDNEYS/URETERS: No hydronephrosis, perinephric stranding or obstructing urinary tract calculi. Right renal cyst. Punctate nonobstructing right lower pole stone.    BLADDER: Within normal limits.  REPRODUCTIVE ORGANS: Myomatous uterus.    BOWEL: No bowel obstruction. Appendix is normal. Colonic diverticulosis.  PERITONEUM: No ascites.  VESSELS: Atherosclerotic changes.  RETROPERITONEUM/LYMPH NODES: No lymphadenopathy.    ABDOMINAL WALL: Marked diastases recti with large broad-based ventral hernia containing bowel.  BONES: Degenerative changes. Interval worsening of severe right hip arthrosis with flattening of the femoral head and superior subluxation. Stable appearance of left sacroiliitis.    IMPRESSION:     No acute findings within the limitations of this noncontrast exam.      Cultures:    2020 blood cx - neg to date x 2

## 2020-01-27 NOTE — PROGRESS NOTE ADULT - PROBLEM SELECTOR PLAN 4
Admission Reconciliation is Completed  Discharge Reconciliation is Not Complete - Moderate pulmonary HTN per TTE (1/13)  - C/w Sildenafil 20mg TID, metoprolol  - Will contact PMD/Pulmonologist regarding regimen Admission Reconciliation is Completed  Discharge Reconciliation is Completed - Moderate pulmonary HTN per TTE (1/13)  - C/w Sildenafil 20mg TID, metoprolol

## 2020-01-27 NOTE — PROGRESS NOTE ADULT - SUBJECTIVE AND OBJECTIVE BOX
Contact Information:  Lala Reddy II, MD, MPH  PGY-1, Internal Medicine  Pager: [phone] ([facility]) /// [phone] ([facility])    AFTAB BASS, MRN-[id number]    Patient is a 64y old  Female who presents with a chief complaint of Change in mental status (26 Jan 2020 14:48)      OVERNIGHT EVENTS:    SUBJECTIVE:    CONSTITUTIONAL: No weakness. No fatigue. No fever.  HEAD: No head trauma.   EYES: No vision changes.  ENT: No hearing changes or tinnitus. No ear pain. No changes in smell. No nasal congestion or discharge. No sore throat. No voice hoarseness.   NECK: No neck pain or stiffness. No lumps.  RESPIRATORY: No cough. No SOB. No wheezing. No hemoptysis.   CARDIOVASCULAR: No chest pain. No palpitations.   GASTROINTESTINAL: No dysphagia. No ABD pain. No distension. No constipation. No diarrhea. No pain with defecation. No hematemesis. No hematochezia or melena.  BACK: No back pain.  GENITOURINARY: No dysuria. No frequency or urgency. No hesitancy. No incontinence. No urinary retention. No suprapubic pain. No hematuria.  EXTREMITY: No swelling.  MUSCULOSKELETAL: No joint pain or swelling. No fractures. No stiffness.    SKIN: No rashes. No itching. No skin, hair, or nail changes.  NEUROLOGICAL: No weakness or paralysis. No lightheadedness or dizziness. No HA. No numbness or tingling.   PSYCHIATRIC: No depression.       OBJECTIVE:  Vital Signs Last 24 Hrs  T(C): --  T(F): --  HR: --  BP: --  BP(mean): --  RR: --  SpO2: --  I&O's Summary    26 Jan 2020 07:01  -  27 Jan 2020 07:00  --------------------------------------------------------  IN: 300 mL / OUT: 0 mL / NET: 300 mL        MEDICATIONS  (STANDING):  dextrose 5%. 1000 milliLiter(s) (50 mL/Hr) IV Continuous <Continuous>  dextrose 50% Injectable 12.5 Gram(s) IV Push once  dextrose 50% Injectable 25 Gram(s) IV Push once  dextrose 50% Injectable 25 Gram(s) IV Push once  enoxaparin Injectable 40 milliGRAM(s) SubCutaneous every 12 hours  influenza   Vaccine 0.5 milliLiter(s) IntraMuscular once  insulin glargine Injectable (LANTUS) 26 Unit(s) SubCutaneous every morning  insulin glargine Injectable (LANTUS) 26 Unit(s) SubCutaneous once  insulin lispro (HumaLOG) corrective regimen sliding scale   SubCutaneous three times a day before meals  insulin lispro (HumaLOG) corrective regimen sliding scale   SubCutaneous at bedtime  insulin lispro Injectable (HumaLOG) 4 Unit(s) SubCutaneous three times a day before meals  levothyroxine Injectable 100 MICROGram(s) IV Push at bedtime  metoprolol succinate ER 50 milliGRAM(s) Oral daily  pantoprazole    Tablet 40 milliGRAM(s) Oral before breakfast  predniSONE   Tablet 15 milliGRAM(s) Oral daily  primidone 50 milliGRAM(s) Oral at bedtime  sildenafil (REVATIO) 20 milliGRAM(s) Oral three times a day  simvastatin 20 milliGRAM(s) Oral at bedtime    MEDICATIONS  (PRN):  dextrose 40% Gel 15 Gram(s) Oral once PRN Blood Glucose LESS THAN 70 milliGRAM(s)/deciLiter  glucagon  Injectable 1 milliGRAM(s) IntraMuscular once PRN Glucose <70 milliGRAM(s)/deciLiter  haloperidol    Injectable 2 milliGRAM(s) IntraMuscular every 4 hours PRN Agitation  haloperidol    Injectable 2 milliGRAM(s) IV Push every 4 hours PRN Agitation  melatonin 3 milliGRAM(s) Oral at bedtime PRN Insomnia    Allergies    No Known Allergies    Intolerances    Seroquel (Other)      CONSTITUTIONAL: No acute distress. Awake and alert.  HEAD: No evidence of trauma. Structures WNL.  EYES: +PERRL. +EOMI. No scleral icterus. No conjunctival injection.  ENT: Moist oral mucosa. No erythema. No pharyngeal exudates.   NECK: Supple. Appropriate ROM. No stiffness. No masses or lymphadenopathy.  RESPIRATORY: CTAB. No wheezes, rales, or rhonchi. No accessory muscle use. No apparent respiratory distress.  CARDIOVASCULAR: +S1/S2. No audible S3/S4. Regular rate and rhythm. No murmurs, rubs, or gallops. 2+ radial pulses x b/l UE; 2+ DP pulses x b/l LE.   GASTROINTESTINAL: Soft, nontender, nondistended. +BS. No rebound or guarding.   BACK: No spinal or paraspinal tenderness. No CVA tenderness.  EXTREMITY: No LE swelling or edema. EXTs warm to touch.  MUSCULOSKELETAL: Movement evident in all limbs. No tenderness on palpation.  DERMATOLOGICAL: No abnormal rashes or lesions.  NEUROLOGICAL: CN 2-12 grossly intact. No focal deficits. Sensation intact x 4EXT. A&Ox3 (oriented to person, place, and time).  PSYCHIATRIC: Appropriate affect.          01-25    137  |  101  |  15  ----------------------------<  213<H>  3.4<L>   |  19<L>  |  0.84    Ca    8.6      25 Jan 2020 08:42  Phos  2.2     01-25  Mg     2.0     01-25      CAPILLARY BLOOD GLUCOSE      POCT Blood Glucose.: 366 mg/dL (26 Jan 2020 14:04)  POCT Blood Glucose.: 323 mg/dL (26 Jan 2020 10:09)              Culture - Blood (collected 25 Jan 2020 04:52)  Source: .Blood Blood  Preliminary Report (26 Jan 2020 05:01):    No growth to date.    Culture - Blood (collected 25 Jan 2020 04:52)  Source: .Blood Blood  Preliminary Report (26 Jan 2020 05:01):    No growth to date.          RADIOLOGY AND ADDITIONAL TESTS:    CONSULTANT NOTES REVIEWED:    CARE DISCUSSED WITH THE FOLLOWING CONSULTANTS/PROVIDERS: Contact Information:  Lala Reddy II, MD, MPH  PGY-1, Internal Medicine  Pager: [phone] ([facility]) /// [phone] ([facility])    AFTAB BASS, MRN-[id number]    Patient is a 64y old  Female who presents with a chief complaint of Change in mental status (26 Jan 2020 14:48)      OVERNIGHT EVENTS: Overnight, patient combative, refusing all medical attention.    SUBJECTIVE: Patient evaluated at bedside, denies any acute complaints, continues to perseverate on being on the 5th floor instead of the 4th floor. Continously calling for patient advocacy.    ROS negative as per patient.    OBJECTIVE:  Vital Signs Last 24 Hrs  T(C): --  T(F): --  HR: --  BP: --  BP(mean): --  RR: --  SpO2: --  I&O's Summary    26 Jan 2020 07:01  -  27 Jan 2020 07:00  --------------------------------------------------------  IN: 300 mL / OUT: 0 mL / NET: 300 mL        MEDICATIONS  (STANDING):  dextrose 5%. 1000 milliLiter(s) (50 mL/Hr) IV Continuous <Continuous>  dextrose 50% Injectable 12.5 Gram(s) IV Push once  dextrose 50% Injectable 25 Gram(s) IV Push once  dextrose 50% Injectable 25 Gram(s) IV Push once  enoxaparin Injectable 40 milliGRAM(s) SubCutaneous every 12 hours  influenza   Vaccine 0.5 milliLiter(s) IntraMuscular once  insulin glargine Injectable (LANTUS) 26 Unit(s) SubCutaneous every morning  insulin glargine Injectable (LANTUS) 26 Unit(s) SubCutaneous once  insulin lispro (HumaLOG) corrective regimen sliding scale   SubCutaneous three times a day before meals  insulin lispro (HumaLOG) corrective regimen sliding scale   SubCutaneous at bedtime  insulin lispro Injectable (HumaLOG) 4 Unit(s) SubCutaneous three times a day before meals  levothyroxine Injectable 100 MICROGram(s) IV Push at bedtime  metoprolol succinate ER 50 milliGRAM(s) Oral daily  pantoprazole    Tablet 40 milliGRAM(s) Oral before breakfast  predniSONE   Tablet 15 milliGRAM(s) Oral daily  primidone 50 milliGRAM(s) Oral at bedtime  sildenafil (REVATIO) 20 milliGRAM(s) Oral three times a day  simvastatin 20 milliGRAM(s) Oral at bedtime    MEDICATIONS  (PRN):  dextrose 40% Gel 15 Gram(s) Oral once PRN Blood Glucose LESS THAN 70 milliGRAM(s)/deciLiter  glucagon  Injectable 1 milliGRAM(s) IntraMuscular once PRN Glucose <70 milliGRAM(s)/deciLiter  haloperidol    Injectable 2 milliGRAM(s) IntraMuscular every 4 hours PRN Agitation  haloperidol    Injectable 2 milliGRAM(s) IV Push every 4 hours PRN Agitation  melatonin 3 milliGRAM(s) Oral at bedtime PRN Insomnia    Allergies    No Known Allergies    Intolerances    Seroquel (Other)      CONSTITUTIONAL: No acute distress. Confrontational.  NECK: Supple. Appropriate ROM. No stiffness. No masses or lymphadenopathy.  RESPIRATORY: CTAB. No wheezes, rales, or rhonchi. No accessory muscle use. No apparent respiratory distress.  CARDIOVASCULAR: +S1/S2. No audible S3/S4. Regular rate and rhythm. No murmurs, rubs, or gallops. 2+ radial pulses x b/l UE; 2+ DP pulses x b/l LE.   GASTROINTESTINAL: Soft, nontender, nondistended. +BS. No rebound or guarding.   BACK: No spinal or paraspinal tenderness. No CVA tenderness.  EXTREMITY: No LE swelling or edema. EXTs warm to touch.  MUSCULOSKELETAL: Movement evident in all limbs. No tenderness on palpation.  DERMATOLOGICAL: No abnormal rashes or lesions.  NEUROLOGICAL: CN 2-12 grossly intact. No focal deficits. Sensation intact x 4EXT. A&Ox3 (oriented to person, place, and time).  PSYCHIATRIC: Appropriate affect.          01-25    137  |  101  |  15  ----------------------------<  213<H>  3.4<L>   |  19<L>  |  0.84    Ca    8.6      25 Jan 2020 08:42  Phos  2.2     01-25  Mg     2.0     01-25      CAPILLARY BLOOD GLUCOSE      POCT Blood Glucose.: 366 mg/dL (26 Jan 2020 14:04)  POCT Blood Glucose.: 323 mg/dL (26 Jan 2020 10:09)              Culture - Blood (collected 25 Jan 2020 04:52)  Source: .Blood Blood  Preliminary Report (26 Jan 2020 05:01):    No growth to date.    Culture - Blood (collected 25 Jan 2020 04:52)  Source: .Blood Blood  Preliminary Report (26 Jan 2020 05:01):    No growth to date.          RADIOLOGY AND ADDITIONAL TESTS:    CONSULTANT NOTES REVIEWED:    CARE DISCUSSED WITH THE FOLLOWING CONSULTANTS/PROVIDERS: Contact Information:  Lala Reddy II, MD, MPH  PGY-1, Internal Medicine  Pager: [phone] ([facility]) /// [phone] ([facility])    AFTAB BASS, MRN-[id number]    Patient is a 64y old  Female who presents with a chief complaint of Change in mental status (26 Jan 2020 14:48)      OVERNIGHT EVENTS: Overnight, patient combative, refusing all medical attention.    SUBJECTIVE: Patient evaluated at bedside, denies any acute complaints, continues to perseverate on being on the 5th floor instead of the 4th floor, continously calling for patient advocacy.    ROS negative as per patient.    OBJECTIVE:  Vital Signs Last 24 Hrs  T(C): --  T(F): --  HR: --  BP: --  BP(mean): --  RR: --  SpO2: --  I&O's Summary    26 Jan 2020 07:01  -  27 Jan 2020 07:00  --------------------------------------------------------  IN: 300 mL / OUT: 0 mL / NET: 300 mL        MEDICATIONS  (STANDING):  dextrose 5%. 1000 milliLiter(s) (50 mL/Hr) IV Continuous <Continuous>  dextrose 50% Injectable 12.5 Gram(s) IV Push once  dextrose 50% Injectable 25 Gram(s) IV Push once  dextrose 50% Injectable 25 Gram(s) IV Push once  enoxaparin Injectable 40 milliGRAM(s) SubCutaneous every 12 hours  influenza   Vaccine 0.5 milliLiter(s) IntraMuscular once  insulin glargine Injectable (LANTUS) 26 Unit(s) SubCutaneous every morning  insulin glargine Injectable (LANTUS) 26 Unit(s) SubCutaneous once  insulin lispro (HumaLOG) corrective regimen sliding scale   SubCutaneous three times a day before meals  insulin lispro (HumaLOG) corrective regimen sliding scale   SubCutaneous at bedtime  insulin lispro Injectable (HumaLOG) 4 Unit(s) SubCutaneous three times a day before meals  levothyroxine Injectable 100 MICROGram(s) IV Push at bedtime  metoprolol succinate ER 50 milliGRAM(s) Oral daily  pantoprazole    Tablet 40 milliGRAM(s) Oral before breakfast  predniSONE   Tablet 15 milliGRAM(s) Oral daily  primidone 50 milliGRAM(s) Oral at bedtime  sildenafil (REVATIO) 20 milliGRAM(s) Oral three times a day  simvastatin 20 milliGRAM(s) Oral at bedtime    MEDICATIONS  (PRN):  dextrose 40% Gel 15 Gram(s) Oral once PRN Blood Glucose LESS THAN 70 milliGRAM(s)/deciLiter  glucagon  Injectable 1 milliGRAM(s) IntraMuscular once PRN Glucose <70 milliGRAM(s)/deciLiter  haloperidol    Injectable 2 milliGRAM(s) IntraMuscular every 4 hours PRN Agitation  haloperidol    Injectable 2 milliGRAM(s) IV Push every 4 hours PRN Agitation  melatonin 3 milliGRAM(s) Oral at bedtime PRN Insomnia    Allergies    No Known Allergies    Intolerances    Seroquel (Other)      CONSTITUTIONAL: No acute distress. Confrontational.  RESPIRATORY: CTAB. No wheezes, rales, or rhonchi. On NC 3LPM.  CARDIOVASCULAR: +S1/S2. Regular rate and rhythm.  GASTROINTESTINAL: Soft, nontender, nondistended. +ABD dressing (h/o SBO s/p ex lap c/b evisceration)  EXTREMITY: No LE swelling or edema. EXTs warm to touch.  MUSCULOSKELETAL: 3/5 strength RUE, 2/5 strength LUE, 1/5 strength RLE, 2/5 strength LLE.  DERMATOLOGICAL: Ecchymoses in b/l UE; discolored LE. Midline ABD would with dressing.  NEUROLOGICAL: A&Ox2-3. Global deficits in motion of extremities (most significantly LUE and RLE).          01-25    137  |  101  |  15  ----------------------------<  213<H>  3.4<L>   |  19<L>  |  0.84    Ca    8.6      25 Jan 2020 08:42  Phos  2.2     01-25  Mg     2.0     01-25      CAPILLARY BLOOD GLUCOSE      POCT Blood Glucose.: 366 mg/dL (26 Jan 2020 14:04)  POCT Blood Glucose.: 323 mg/dL (26 Jan 2020 10:09)              Culture - Blood (collected 25 Jan 2020 04:52)  Source: .Blood Blood  Preliminary Report (26 Jan 2020 05:01):    No growth to date.    Culture - Blood (collected 25 Jan 2020 04:52)  Source: .Blood Blood  Preliminary Report (26 Jan 2020 05:01):    No growth to date.          RADIOLOGY AND ADDITIONAL TESTS:    CONSULTANT NOTES REVIEWED:    CARE DISCUSSED WITH THE FOLLOWING CONSULTANTS/PROVIDERS:

## 2020-01-27 NOTE — PROGRESS NOTE ADULT - PROBLEM SELECTOR PLAN 1
-   - No clinical evidence of myxedema coma (hypoglycemia, hypothermia, or bradycardia), though patient refusing vital signs  - C/w Synthroid 137  - F/u TPO to assess for Hashimoto encephalopathy if patient allows  - F/u Endocrinology recs - Per son, mental status change for 3-4w, characterized by increased aggressiveness and distrust of family and home health aids  - Ddx: Psychiatric/paranoia vs endocrinopathy (given abnormal thyroid dysfunction) vs infectious (unlikely as patient stable, Bcx negative)  - Highly aggressive and physical, on 1:1 observation  - Seen by Neuro, no acute intervention  - CT from previous admission benign, consider MRI if condition worsens  - Per Psych, DOES NOT HAVE CAPACITY TO LEAVE OR REFUSE MEDS; recommends Seroquel 25mg TID + Haldol 2mg IV or IM Q4H for agitation; patient intolerant of Seroquel, will discuss substitute - Per son, mental status change for 3-4w, characterized by increased aggressiveness and distrust of family and home health aids  - Ddx: Psychiatric/paranoia vs endocrinopathy (given abnormal thyroid dysfunction) vs infectious (unlikely as patient stable, Bcx negative)  - Highly aggressive and physical  - Seen by Neuro, no acute intervention  - CT from previous admission benign, consider MRI if condition worsens  - Per Psych, DOES NOT HAVE CAPACITY TO LEAVE OR REFUSE MEDS; recommends Seroquel 25mg TID + Haldol 2mg IV or IM Q4H for agitation; patient intolerant of Seroquel, will discuss substitute

## 2020-01-27 NOTE — PROGRESS NOTE BEHAVIORAL HEALTH - NSBHCONSULTRECOMMENDOTHER_PSY_A_CORE FT
- consider Ortho consultation: CT showing interval worsening of severe right hip arthrosis with flattening of the femoral head and superior subluxation --> pain --> agitation     - ensure the diastesis recti with large ventral hernia w/ bowels is reduceable and not an active issue which is contributing to Pt's agitation    - consider standing seroquel 25 mg po TID for mood stabiliity, f/u QTc < 500. if pt refuses standing seroquel, consider standing haldol 1 mg IV TID, f/u QTc< 500. Pt's sonSteph is HCP and is in accord with above plan    - continue delirium workup as indicated above

## 2020-01-27 NOTE — PROGRESS NOTE ADULT - ATTENDING COMMENTS
Patient seen and examined. Agree with note as above with addendum: spent 20 minutes with patient and her 2 sons trying to get an understanding as to why she has not been cooperative with the team. She had a bad experience here in relation to her stomach wound which makes her very paranoid about receiving care here. There is also an issue of her taking her own medications but her son agreed to take them home.  Afterwards her 2 sons, the medicine intern, the endocrine fellow, RN. functional RN and asst RN mgr discussed a plan of care:   a) consult rheumatology as she is only on prednisone without any DMARDS - can her RA be contributing to her mental state?  b) hypothyroidism is severe but her IV access is limited, so alternative methods of giving would be via NGT which she would not like or rectally which she would also not like, so hopefully the primary team can maintain IV access as her levels will take weeks to improve.  c) her son notes that seroquel makes her SOB so can psych consider another option for her which is standing and not prn  Jia Jules MD  Pager: [phone]  Nights and Weekends: [phone]

## 2020-01-27 NOTE — PROGRESS NOTE ADULT - PROBLEM SELECTOR PLAN 2
- Per son, mental status change for 3-4w, characterized by increased aggressiveness and distrust of family and home health aids  - Ddx: Psychiatric/paranoia vs endocrinopathy (given abnormal thyroid dysfunction) vs infectious (unlikely as patient stable, Bcx negative)  - Highly aggressive and physical, on 1:1 observation  - Seen by Neuro, no acute intervention  - CT from previous admission benign, consider MRI if condition worsens  - Per Psych, DOES NOT HAVE CAPACITY TO LEAVE OR REFUSE MEDS  - Haldol 2mg IV or IM Q4H for agitation -   - No clinical evidence of myxedema coma (hypoglycemia, hypothermia, or bradycardia), though patient refusing vital signs  - Per Endocrine, c/w synthroid 100 IV, f/u FT4 on 1/31  - F/u TPO to assess for Hashimoto encephalopathy

## 2020-01-27 NOTE — PROGRESS NOTE ADULT - ATTENDING COMMENTS
Patient seen and examined. Refused physical exam from me. States she was told she would be on the fourth floor, not fifth, and perseverates on it. She is not able to comprehend why she is here, and continues to refuse vital signs. Family at bedside as well. Today, she agreed with nurse manager to take synthroid, her Lantus, and have blood drawn. Was evaluated by psych as well, and will place on seroquel. Will f/u labs and neurology.

## 2020-01-27 NOTE — PROGRESS NOTE ADULT - PROBLEM SELECTOR PLAN 3
- Bcx + for GPR on 1/12 per patient records   - On presentation, tachycardia, otherwise not meeting SIRS criteria  - +Bcx from 1/12 sent to [facility] laboratories, currently no results; will call [phone] to f/u  - Will monitor off Abx - Bcx + for GPR on 1/12 per patient records   - On presentation, tachycardia, otherwise not meeting SIRS criteria  - +Bcx from 1/12 sent to [facility] laboratories, currently no results  - Will monitor off Abx

## 2020-01-27 NOTE — PROGRESS NOTE ADULT - PROBLEM SELECTOR PLAN 1
, FT4 0.3 today. Stable vitals on admission, not in myxdema coma.   -continue LT4 100 mcg IV   -repeat FT4 on 1/31 , FT4 0.3 today. Stable vitals on admission, not in myxdema coma.   -continue LT4 100 mcg IV   -repeat FT4 on 1/31  -would consider neurologic manifestations of rheumatoid arthritis , FT4 0.3 today. Stable vitals on admission, not in myxedema coma.   -continue LT4 100 mcg IV   -repeat FT4 on 1/31  -would consider neurologic manifestations of rheumatoid arthritis

## 2020-01-27 NOTE — PROGRESS NOTE ADULT - SUBJECTIVE AND OBJECTIVE BOX
Chief Complaint: T2DM and hypothyroidism     History: Unable to have a coherent conversation with patient. She is seen eating a cup of fruits and would not allow for examination today.     MEDICATIONS  (STANDING):  dextrose 5%. 1000 milliLiter(s) (50 mL/Hr) IV Continuous <Continuous>  dextrose 50% Injectable 12.5 Gram(s) IV Push once  dextrose 50% Injectable 25 Gram(s) IV Push once  dextrose 50% Injectable 25 Gram(s) IV Push once  enoxaparin Injectable 40 milliGRAM(s) SubCutaneous every 12 hours  influenza   Vaccine 0.5 milliLiter(s) IntraMuscular once  insulin glargine Injectable (LANTUS) 30 Unit(s) SubCutaneous every morning  insulin lispro (HumaLOG) corrective regimen sliding scale   SubCutaneous three times a day before meals  insulin lispro (HumaLOG) corrective regimen sliding scale   SubCutaneous at bedtime  insulin lispro Injectable (HumaLOG) 4 Unit(s) SubCutaneous three times a day before meals  levothyroxine Injectable 100 MICROGram(s) IV Push at bedtime  metoprolol succinate ER 50 milliGRAM(s) Oral daily  pantoprazole    Tablet 40 milliGRAM(s) Oral before breakfast  predniSONE   Tablet 15 milliGRAM(s) Oral daily  primidone 50 milliGRAM(s) Oral at bedtime  QUEtiapine 25 milliGRAM(s) Oral three times a day  sildenafil (REVATIO) 20 milliGRAM(s) Oral three times a day  simvastatin 20 milliGRAM(s) Oral at bedtime    MEDICATIONS  (PRN):  dextrose 40% Gel 15 Gram(s) Oral once PRN Blood Glucose LESS THAN 70 milliGRAM(s)/deciLiter  glucagon  Injectable 1 milliGRAM(s) IntraMuscular once PRN Glucose <70 milliGRAM(s)/deciLiter  haloperidol    Injectable 2 milliGRAM(s) IntraMuscular every 4 hours PRN Agitation  haloperidol    Injectable 2 milliGRAM(s) IV Push every 4 hours PRN Agitation  melatonin 3 milliGRAM(s) Oral at bedtime PRN Insomnia      Allergies    No Known Allergies    Intolerances    Seroquel (Other)      PHYSICAL EXAM:  VITALS: T(C): --  T(F): --  HR: --  BP: --  RR: --  SpO2: --  Wt(kg): --  GENERAL: NAD, well-groomed, well-developed  EYES: No proptosis,  anicteric  HEENT:  Atraumatic, Normocephalic, moist mucous membranes  NEURO: AOx0, moves all extremities spontaenuously   PSYCH:  reactive affect, euthymic mood      POCT Blood Glucose.: 304 mg/dL (01-27-20 @ 13:39)  POCT Blood Glucose.: 461 mg/dL (01-27-20 @ 10:15)  POCT Blood Glucose.: 410 mg/dL (01-27-20 @ 10:13)  POCT Blood Glucose.: 344 mg/dL (01-27-20 @ 09:46)L26, H4+4   POCT Blood Glucose.: 366 mg/dL (01-26-20 @ 14:04)  POCT Blood Glucose.: 323 mg/dL (01-26-20 @ 10:09)  POCT Blood Glucose.: 210 mg/dL (01-25-20 @ 06:04)      01-27    140  |  104  |  11  ----------------------------<  353<H>  4.2   |  19<L>  |  0.92    EGFR if : 77  EGFR if non : 67    Ca    9.3      01-27  Mg     2.1     01-27  Phos  1.2     01-27    TPro  7.0  /  Alb  4.0  /  TBili  0.1<L>  /  DBili  x   /  AST  15  /  ALT  18  /  AlkPhos  89  01-27          Thyroid Function Tests:  01-27 @ 13:23 TSH -- FreeT4 0.3 T3 -- Anti TPO -- Anti Thyroglobulin Ab -- TSI --  01-25 @ 01:52 .00 FreeT4 -- T3 24 Anti TPO -- Anti Thyroglobulin Ab -- TSI -- Chief Complaint: T2DM and hypothyroidism     History: Unable to have a coherent conversation with patient. She is seen eating a cup of fruits and would not allow for examination today.     MEDICATIONS  (STANDING):  dextrose 5%. 1000 milliLiter(s) (50 mL/Hr) IV Continuous <Continuous>  dextrose 50% Injectable 12.5 Gram(s) IV Push once  dextrose 50% Injectable 25 Gram(s) IV Push once  dextrose 50% Injectable 25 Gram(s) IV Push once  enoxaparin Injectable 40 milliGRAM(s) SubCutaneous every 12 hours  influenza   Vaccine 0.5 milliLiter(s) IntraMuscular once  insulin glargine Injectable (LANTUS) 30 Unit(s) SubCutaneous every morning  insulin lispro (HumaLOG) corrective regimen sliding scale   SubCutaneous three times a day before meals  insulin lispro (HumaLOG) corrective regimen sliding scale   SubCutaneous at bedtime  insulin lispro Injectable (HumaLOG) 4 Unit(s) SubCutaneous three times a day before meals  levothyroxine Injectable 100 MICROGram(s) IV Push at bedtime  metoprolol succinate ER 50 milliGRAM(s) Oral daily  pantoprazole    Tablet 40 milliGRAM(s) Oral before breakfast  predniSONE   Tablet 15 milliGRAM(s) Oral daily  primidone 50 milliGRAM(s) Oral at bedtime  QUEtiapine 25 milliGRAM(s) Oral three times a day  sildenafil (REVATIO) 20 milliGRAM(s) Oral three times a day  simvastatin 20 milliGRAM(s) Oral at bedtime    MEDICATIONS  (PRN):  dextrose 40% Gel 15 Gram(s) Oral once PRN Blood Glucose LESS THAN 70 milliGRAM(s)/deciLiter  glucagon  Injectable 1 milliGRAM(s) IntraMuscular once PRN Glucose <70 milliGRAM(s)/deciLiter  haloperidol    Injectable 2 milliGRAM(s) IntraMuscular every 4 hours PRN Agitation  haloperidol    Injectable 2 milliGRAM(s) IV Push every 4 hours PRN Agitation  melatonin 3 milliGRAM(s) Oral at bedtime PRN Insomnia      Allergies  No Known Allergies    Intolerances  Seroquel (Other)      PHYSICAL EXAM:  VITALS: patient refused  GENERAL: NAD, well-groomed, well-developed  EYES: No proptosis,  anicteric  HEENT:  Atraumatic, Normocephalic, moist mucous membranes  PSYCH:  +flay affect, +paranoid mood      POCT Blood Glucose.: 304 mg/dL (01-27-20 @ 13:39)  POCT Blood Glucose.: 461 mg/dL (01-27-20 @ 10:15)  POCT Blood Glucose.: 410 mg/dL (01-27-20 @ 10:13)  POCT Blood Glucose.: 344 mg/dL (01-27-20 @ 09:46)L26, H4+4   POCT Blood Glucose.: 366 mg/dL (01-26-20 @ 14:04)  POCT Blood Glucose.: 323 mg/dL (01-26-20 @ 10:09)  POCT Blood Glucose.: 210 mg/dL (01-25-20 @ 06:04)      01-27    140  |  104  |  11  ----------------------------<  353<H>  4.2   |  19<L>  |  0.92    EGFR if : 77  EGFR if non : 67    Ca    9.3      01-27  Mg     2.1     01-27  Phos  1.2     01-27    TPro  7.0  /  Alb  4.0  /  TBili  0.1<L>  /  DBili  x   /  AST  15  /  ALT  18  /  AlkPhos  89  01-27          Thyroid Function Tests:  01-27 @ 13:23 FreeT4 0.3   01-25 @ 01:52 .00 T3 24

## 2020-01-28 LAB
ANION GAP SERPL CALC-SCNC: 13 MMOL/L — SIGNIFICANT CHANGE UP (ref 5–17)
BUN SERPL-MCNC: 10 MG/DL — SIGNIFICANT CHANGE UP (ref 7–23)
CALCIUM SERPL-MCNC: 9.3 MG/DL — SIGNIFICANT CHANGE UP (ref 8.4–10.5)
CHLORIDE SERPL-SCNC: 108 MMOL/L — SIGNIFICANT CHANGE UP (ref 96–108)
CO2 SERPL-SCNC: 19 MMOL/L — LOW (ref 22–31)
CREAT SERPL-MCNC: 0.8 MG/DL — SIGNIFICANT CHANGE UP (ref 0.5–1.3)
GLUCOSE BLDC GLUCOMTR-MCNC: 134 MG/DL — HIGH (ref 70–99)
GLUCOSE BLDC GLUCOMTR-MCNC: 155 MG/DL — HIGH (ref 70–99)
GLUCOSE BLDC GLUCOMTR-MCNC: 176 MG/DL — HIGH (ref 70–99)
GLUCOSE BLDC GLUCOMTR-MCNC: 213 MG/DL — HIGH (ref 70–99)
GLUCOSE BLDC GLUCOMTR-MCNC: 300 MG/DL — HIGH (ref 70–99)
GLUCOSE SERPL-MCNC: 149 MG/DL — HIGH (ref 70–99)
HBA1C BLD-MCNC: 9 % — HIGH (ref 4–5.6)
HBV CORE AB SER-ACNC: SIGNIFICANT CHANGE UP
HBV SURFACE AB SER-ACNC: SIGNIFICANT CHANGE UP
HBV SURFACE AG SER-ACNC: SIGNIFICANT CHANGE UP
HCV AB S/CO SERPL IA: 0.09 S/CO — SIGNIFICANT CHANGE UP (ref 0–0.99)
HCV AB SERPL-IMP: SIGNIFICANT CHANGE UP
MAGNESIUM SERPL-MCNC: 2.2 MG/DL — SIGNIFICANT CHANGE UP (ref 1.6–2.6)
PHOSPHATE SERPL-MCNC: 2.1 MG/DL — LOW (ref 2.5–4.5)
POTASSIUM SERPL-MCNC: 4.1 MMOL/L — SIGNIFICANT CHANGE UP (ref 3.5–5.3)
POTASSIUM SERPL-SCNC: 4.1 MMOL/L — SIGNIFICANT CHANGE UP (ref 3.5–5.3)
SODIUM SERPL-SCNC: 140 MMOL/L — SIGNIFICANT CHANGE UP (ref 135–145)
T3FREE SERPL-MCNC: 0.9 PG/ML — LOW (ref 1.8–4.6)
T4 FREE SERPL-MCNC: 0.7 NG/DL — LOW (ref 0.9–1.8)
THYROPEROXIDASE AB SERPL-ACNC: 272 IU/ML — HIGH

## 2020-01-28 PROCEDURE — 99233 SBSQ HOSP IP/OBS HIGH 50: CPT | Mod: GC

## 2020-01-28 PROCEDURE — 99223 1ST HOSP IP/OBS HIGH 75: CPT | Mod: GC

## 2020-01-28 RX ORDER — IPRATROPIUM/ALBUTEROL SULFATE 18-103MCG
3 AEROSOL WITH ADAPTER (GRAM) INHALATION EVERY 6 HOURS
Refills: 0 | Status: DISCONTINUED | OUTPATIENT
Start: 2020-01-28 | End: 2020-01-28

## 2020-01-28 RX ORDER — INSULIN LISPRO 100/ML
10 VIAL (ML) SUBCUTANEOUS
Refills: 0 | Status: DISCONTINUED | OUTPATIENT
Start: 2020-01-28 | End: 2020-01-28

## 2020-01-28 RX ORDER — TRAMADOL HYDROCHLORIDE 50 MG/1
100 TABLET ORAL DAILY
Refills: 0 | Status: DISCONTINUED | OUTPATIENT
Start: 2020-01-28 | End: 2020-01-31

## 2020-01-28 RX ORDER — INSULIN LISPRO 100/ML
VIAL (ML) SUBCUTANEOUS AT BEDTIME
Refills: 0 | Status: DISCONTINUED | OUTPATIENT
Start: 2020-01-28 | End: 2020-01-31

## 2020-01-28 RX ORDER — INSULIN LISPRO 100/ML
8 VIAL (ML) SUBCUTANEOUS
Refills: 0 | Status: DISCONTINUED | OUTPATIENT
Start: 2020-01-28 | End: 2020-01-29

## 2020-01-28 RX ORDER — BUDESONIDE AND FORMOTEROL FUMARATE DIHYDRATE 160; 4.5 UG/1; UG/1
2 AEROSOL RESPIRATORY (INHALATION)
Refills: 0 | Status: DISCONTINUED | OUTPATIENT
Start: 2020-01-28 | End: 2020-03-27

## 2020-01-28 RX ORDER — GABAPENTIN 400 MG/1
400 CAPSULE ORAL THREE TIMES A DAY
Refills: 0 | Status: DISCONTINUED | OUTPATIENT
Start: 2020-01-28 | End: 2020-02-12

## 2020-01-28 RX ORDER — ACETAMINOPHEN 500 MG
650 TABLET ORAL EVERY 6 HOURS
Refills: 0 | Status: DISCONTINUED | OUTPATIENT
Start: 2020-01-28 | End: 2020-02-14

## 2020-01-28 RX ORDER — IPRATROPIUM/ALBUTEROL SULFATE 18-103MCG
3 AEROSOL WITH ADAPTER (GRAM) INHALATION EVERY 6 HOURS
Refills: 0 | Status: DISCONTINUED | OUTPATIENT
Start: 2020-01-28 | End: 2020-03-27

## 2020-01-28 RX ADMIN — INSULIN GLARGINE 30 UNIT(S): 100 INJECTION, SOLUTION SUBCUTANEOUS at 09:49

## 2020-01-28 RX ADMIN — GABAPENTIN 400 MILLIGRAM(S): 400 CAPSULE ORAL at 23:00

## 2020-01-28 RX ADMIN — Medication 100 MICROGRAM(S): at 23:29

## 2020-01-28 RX ADMIN — Medication 50 MILLIGRAM(S): at 12:04

## 2020-01-28 RX ADMIN — Medication 2: at 00:48

## 2020-01-28 RX ADMIN — PRIMIDONE 50 MILLIGRAM(S): 250 TABLET ORAL at 21:05

## 2020-01-28 RX ADMIN — Medication 8 UNIT(S): at 09:48

## 2020-01-28 RX ADMIN — SIMVASTATIN 20 MILLIGRAM(S): 20 TABLET, FILM COATED ORAL at 21:05

## 2020-01-28 RX ADMIN — PRIMIDONE 50 MILLIGRAM(S): 250 TABLET ORAL at 00:47

## 2020-01-28 RX ADMIN — Medication 8 UNIT(S): at 15:06

## 2020-01-28 RX ADMIN — POTASSIUM PHOSPHATE, MONOBASIC POTASSIUM PHOSPHATE, DIBASIC 83.33 MILLIMOLE(S): 236; 224 INJECTION, SOLUTION INTRAVENOUS at 00:46

## 2020-01-28 RX ADMIN — Medication 4: at 09:48

## 2020-01-28 RX ADMIN — Medication 2: at 15:06

## 2020-01-28 RX ADMIN — BUDESONIDE AND FORMOTEROL FUMARATE DIHYDRATE 2 PUFF(S): 160; 4.5 AEROSOL RESPIRATORY (INHALATION) at 17:09

## 2020-01-28 RX ADMIN — Medication 15 MILLIGRAM(S): at 12:06

## 2020-01-28 RX ADMIN — HALOPERIDOL DECANOATE 2 MILLIGRAM(S): 100 INJECTION INTRAMUSCULAR at 00:45

## 2020-01-28 RX ADMIN — HALOPERIDOL DECANOATE 2 MILLIGRAM(S): 100 INJECTION INTRAMUSCULAR at 09:00

## 2020-01-28 RX ADMIN — SIMVASTATIN 20 MILLIGRAM(S): 20 TABLET, FILM COATED ORAL at 00:47

## 2020-01-28 RX ADMIN — Medication 8 UNIT(S): at 18:51

## 2020-01-28 RX ADMIN — Medication 100 MICROGRAM(S): at 00:46

## 2020-01-28 NOTE — PROGRESS NOTE ADULT - ATTENDING COMMENTS
Agree with fellow's note above  Navya Davidson (pager [phone])  On evenings and weekends, please call [phone] or page endocrine fellow on call.   Please note that this patient may be followed by different provider tomorrow. If no answer, contact endocrine fellow on call.

## 2020-01-28 NOTE — PROGRESS NOTE ADULT - PROBLEM SELECTOR PLAN 2
-  on admission; most recent TSH 52  - No clinical evidence of myxedema coma (hypoglycemia, hypothermia, or bradycardia), though patient refusing vital signs  - Per Endocrine, c/w synthroid 100 IV, f/u FT4 on 1/31  - F/u TPO to assess for Hashimoto encephalopathy -  on admission; most recent TSH 52, TPO Ab elevated  - No overt clinical evidence of myxedema coma (hypoglycemia, hypothermia, or bradycardia), though patient refusing vital signs  - Per Endocrine, c/w synthroid 100 IV, f/u FT4 on 1/31

## 2020-01-28 NOTE — PROGRESS NOTE ADULT - PROBLEM SELECTOR PLAN 1
, FT4 0.3 today. Stable vitals on admission, not in myxedema coma.   - continue LT4 100 mcg IV   - repeat FT4 on 1/31

## 2020-01-28 NOTE — PROGRESS NOTE ADULT - PROBLEM SELECTOR PLAN 6
- C/w prednisone 15 mg  - Tramadol PRN  - Rheum consult pending - C/w prednisone 15 mg  - Tramadol PRN  - Rheum - no rheumatological involvement

## 2020-01-28 NOTE — PROGRESS NOTE ADULT - SUBJECTIVE AND OBJECTIVE BOX
Contact Information:  Lala Reddy II, MD, MPH  PGY-1, Internal Medicine  Pager: [phone] ([facility]) /// [phone] ([facility])    AFTAB BASS, MRN-[id number]    Patient is a 64y old  Female who presents with a chief complaint of change in mental status (27 Jan 2020 16:37)      OVERNIGHT EVENTS:    SUBJECTIVE:    CONSTITUTIONAL: No weakness. No fatigue. No fever.  HEAD: No head trauma.   EYES: No vision changes.  ENT: No hearing changes or tinnitus. No ear pain. No changes in smell. No nasal congestion or discharge. No sore throat. No voice hoarseness.   NECK: No neck pain or stiffness. No lumps.  RESPIRATORY: No cough. No SOB. No wheezing. No hemoptysis.   CARDIOVASCULAR: No chest pain. No palpitations.   GASTROINTESTINAL: No dysphagia. No ABD pain. No distension. No constipation. No diarrhea. No pain with defecation. No hematemesis. No hematochezia or melena.  BACK: No back pain.  GENITOURINARY: No dysuria. No frequency or urgency. No hesitancy. No incontinence. No urinary retention. No suprapubic pain. No hematuria.  EXTREMITY: No swelling.  MUSCULOSKELETAL: No joint pain or swelling. No fractures. No stiffness.    SKIN: No rashes. No itching. No skin, hair, or nail changes.  NEUROLOGICAL: No weakness or paralysis. No lightheadedness or dizziness. No HA. No numbness or tingling.   PSYCHIATRIC: No depression.       OBJECTIVE:  Vital Signs Last 24 Hrs  T(C): --  T(F): --  HR: --  BP: --  BP(mean): --  RR: --  SpO2: --  I&O's Summary    26 Jan 2020 07:01  -  27 Jan 2020 07:00  --------------------------------------------------------  IN: 300 mL / OUT: 0 mL / NET: 300 mL        MEDICATIONS  (STANDING):  dextrose 5%. 1000 milliLiter(s) (50 mL/Hr) IV Continuous <Continuous>  dextrose 50% Injectable 12.5 Gram(s) IV Push once  dextrose 50% Injectable 25 Gram(s) IV Push once  dextrose 50% Injectable 25 Gram(s) IV Push once  enoxaparin Injectable 40 milliGRAM(s) SubCutaneous every 12 hours  influenza   Vaccine 0.5 milliLiter(s) IntraMuscular once  insulin glargine Injectable (LANTUS) 30 Unit(s) SubCutaneous every morning  insulin lispro (HumaLOG) corrective regimen sliding scale   SubCutaneous three times a day before meals  insulin lispro (HumaLOG) corrective regimen sliding scale   SubCutaneous at bedtime  insulin lispro Injectable (HumaLOG) 8 Unit(s) SubCutaneous three times a day before meals  levothyroxine Injectable 100 MICROGram(s) IV Push at bedtime  metoprolol succinate ER 50 milliGRAM(s) Oral daily  pantoprazole    Tablet 40 milliGRAM(s) Oral before breakfast  predniSONE   Tablet 15 milliGRAM(s) Oral daily  primidone 50 milliGRAM(s) Oral at bedtime  sildenafil (REVATIO) 20 milliGRAM(s) Oral three times a day  simvastatin 20 milliGRAM(s) Oral at bedtime    MEDICATIONS  (PRN):  dextrose 40% Gel 15 Gram(s) Oral once PRN Blood Glucose LESS THAN 70 milliGRAM(s)/deciLiter  glucagon  Injectable 1 milliGRAM(s) IntraMuscular once PRN Glucose <70 milliGRAM(s)/deciLiter  haloperidol    Injectable 2 milliGRAM(s) IntraMuscular every 4 hours PRN Agitation  haloperidol    Injectable 2 milliGRAM(s) IV Push every 4 hours PRN Agitation  melatonin 3 milliGRAM(s) Oral at bedtime PRN Insomnia    Allergies    No Known Allergies    Intolerances    Seroquel (Other)      CONSTITUTIONAL: No acute distress. Awake and alert.  HEAD: No evidence of trauma. Structures WNL.  EYES: +PERRL. +EOMI. No scleral icterus. No conjunctival injection.  ENT: Moist oral mucosa. No erythema. No pharyngeal exudates.   NECK: Supple. Appropriate ROM. No stiffness. No masses or lymphadenopathy.  RESPIRATORY: CTAB. No wheezes, rales, or rhonchi. No accessory muscle use. No apparent respiratory distress.  CARDIOVASCULAR: +S1/S2. No audible S3/S4. Regular rate and rhythm. No murmurs, rubs, or gallops. 2+ radial pulses x b/l UE; 2+ DP pulses x b/l LE.   GASTROINTESTINAL: Soft, nontender, nondistended. +BS. No rebound or guarding.   BACK: No spinal or paraspinal tenderness. No CVA tenderness.  EXTREMITY: No LE swelling or edema. EXTs warm to touch.  MUSCULOSKELETAL: Movement evident in all limbs. No tenderness on palpation.  DERMATOLOGICAL: No abnormal rashes or lesions.  NEUROLOGICAL: CN 2-12 grossly intact. No focal deficits. Sensation intact x 4EXT. A&Ox3 (oriented to person, place, and time).  PSYCHIATRIC: Appropriate affect.                            9.3    11.15 )-----------( 327      ( 27 Jan 2020 15:09 )             30.0     PT/INR - ( 27 Jan 2020 15:09 )   PT: 10.7 sec;   INR: 0.94 ratio         PTT - ( 27 Jan 2020 15:09 )  PTT:25.6 sec  01-27    140  |  104  |  11  ----------------------------<  353<H>  4.2   |  19<L>  |  0.92    Ca    9.3      27 Jan 2020 15:09  Phos  1.2     01-27  Mg     2.1     01-27    TPro  7.0  /  Alb  4.0  /  TBili  0.1<L>  /  DBili  x   /  AST  15  /  ALT  18  /  AlkPhos  89  01-27    CAPILLARY BLOOD GLUCOSE      POCT Blood Glucose.: 300 mg/dL (28 Jan 2020 00:43)  POCT Blood Glucose.: 262 mg/dL (27 Jan 2020 23:06)  POCT Blood Glucose.: 229 mg/dL (27 Jan 2020 19:09)  POCT Blood Glucose.: 304 mg/dL (27 Jan 2020 13:39)  POCT Blood Glucose.: 461 mg/dL (27 Jan 2020 10:15)  POCT Blood Glucose.: 410 mg/dL (27 Jan 2020 10:13)  POCT Blood Glucose.: 344 mg/dL (27 Jan 2020 09:46)    LIVER FUNCTIONS - ( 27 Jan 2020 15:09 )  Alb: 4.0 g/dL / Pro: 7.0 g/dL / ALK PHOS: 89 U/L / ALT: 18 U/L / AST: 15 U/L / GGT: x                       RADIOLOGY AND ADDITIONAL TESTS:    CONSULTANT NOTES REVIEWED:    CARE DISCUSSED WITH THE FOLLOWING CONSULTANTS/PROVIDERS: Contact Information:  Lala Reddy II, MD, MPH  PGY-1, Internal Medicine  Pager: [phone] ([facility]) /// [phone] ([facility])    AFTAB BASS, MRN-[id number]    Patient is a 64y old  Female who presents with a chief complaint of change in mental status (27 Jan 2020 16:37)      OVERNIGHT EVENTS: Overnight, patient was combative, perseverating on being transported to the 4th floor. Received haloperidol.     SUBJECTIVE:    CONSTITUTIONAL: No weakness. No fatigue. No fever.  HEAD: No head trauma.   EYES: No vision changes.  ENT: No hearing changes or tinnitus. No ear pain. No changes in smell. No nasal congestion or discharge. No sore throat. No voice hoarseness.   NECK: No neck pain or stiffness. No lumps.  RESPIRATORY: No cough. No SOB. No wheezing. No hemoptysis.   CARDIOVASCULAR: No chest pain. No palpitations.   GASTROINTESTINAL: No dysphagia. No ABD pain. No distension. No constipation. No diarrhea. No pain with defecation. No hematemesis. No hematochezia or melena.  BACK: No back pain.  GENITOURINARY: No dysuria. No frequency or urgency. No hesitancy. No incontinence. No urinary retention. No suprapubic pain. No hematuria.  EXTREMITY: No swelling.  MUSCULOSKELETAL: No joint pain or swelling. No fractures. No stiffness.    SKIN: No rashes. No itching. No skin, hair, or nail changes.  NEUROLOGICAL: No weakness or paralysis. No lightheadedness or dizziness. No HA. No numbness or tingling.   PSYCHIATRIC: No depression.       OBJECTIVE:  Vital Signs Last 24 Hrs  T(C): --  T(F): --  HR: --  BP: --  BP(mean): --  RR: --  SpO2: --  I&O's Summary    26 Jan 2020 07:01  -  27 Jan 2020 07:00  --------------------------------------------------------  IN: 300 mL / OUT: 0 mL / NET: 300 mL        MEDICATIONS  (STANDING):  dextrose 5%. 1000 milliLiter(s) (50 mL/Hr) IV Continuous <Continuous>  dextrose 50% Injectable 12.5 Gram(s) IV Push once  dextrose 50% Injectable 25 Gram(s) IV Push once  dextrose 50% Injectable 25 Gram(s) IV Push once  enoxaparin Injectable 40 milliGRAM(s) SubCutaneous every 12 hours  influenza   Vaccine 0.5 milliLiter(s) IntraMuscular once  insulin glargine Injectable (LANTUS) 30 Unit(s) SubCutaneous every morning  insulin lispro (HumaLOG) corrective regimen sliding scale   SubCutaneous three times a day before meals  insulin lispro (HumaLOG) corrective regimen sliding scale   SubCutaneous at bedtime  insulin lispro Injectable (HumaLOG) 8 Unit(s) SubCutaneous three times a day before meals  levothyroxine Injectable 100 MICROGram(s) IV Push at bedtime  metoprolol succinate ER 50 milliGRAM(s) Oral daily  pantoprazole    Tablet 40 milliGRAM(s) Oral before breakfast  predniSONE   Tablet 15 milliGRAM(s) Oral daily  primidone 50 milliGRAM(s) Oral at bedtime  sildenafil (REVATIO) 20 milliGRAM(s) Oral three times a day  simvastatin 20 milliGRAM(s) Oral at bedtime    MEDICATIONS  (PRN):  dextrose 40% Gel 15 Gram(s) Oral once PRN Blood Glucose LESS THAN 70 milliGRAM(s)/deciLiter  glucagon  Injectable 1 milliGRAM(s) IntraMuscular once PRN Glucose <70 milliGRAM(s)/deciLiter  haloperidol    Injectable 2 milliGRAM(s) IntraMuscular every 4 hours PRN Agitation  haloperidol    Injectable 2 milliGRAM(s) IV Push every 4 hours PRN Agitation  melatonin 3 milliGRAM(s) Oral at bedtime PRN Insomnia    Allergies    No Known Allergies    Intolerances    Seroquel (Other)      CONSTITUTIONAL: No acute distress. Awake and alert.  HEAD: No evidence of trauma. Structures WNL.  EYES: +PERRL. +EOMI. No scleral icterus. No conjunctival injection.  ENT: Moist oral mucosa. No erythema. No pharyngeal exudates.   NECK: Supple. Appropriate ROM. No stiffness. No masses or lymphadenopathy.  RESPIRATORY: CTAB. No wheezes, rales, or rhonchi. No accessory muscle use. No apparent respiratory distress.  CARDIOVASCULAR: +S1/S2. No audible S3/S4. Regular rate and rhythm. No murmurs, rubs, or gallops. 2+ radial pulses x b/l UE; 2+ DP pulses x b/l LE.   GASTROINTESTINAL: Soft, nontender, nondistended. +BS. No rebound or guarding.   BACK: No spinal or paraspinal tenderness. No CVA tenderness.  EXTREMITY: No LE swelling or edema. EXTs warm to touch.  MUSCULOSKELETAL: Movement evident in all limbs. No tenderness on palpation.  DERMATOLOGICAL: No abnormal rashes or lesions.  NEUROLOGICAL: CN 2-12 grossly intact. No focal deficits. Sensation intact x 4EXT. A&Ox3 (oriented to person, place, and time).  PSYCHIATRIC: Appropriate affect.                            9.3    11.15 )-----------( 327      ( 27 Jan 2020 15:09 )             30.0     PT/INR - ( 27 Jan 2020 15:09 )   PT: 10.7 sec;   INR: 0.94 ratio         PTT - ( 27 Jan 2020 15:09 )  PTT:25.6 sec  01-27    140  |  104  |  11  ----------------------------<  353<H>  4.2   |  19<L>  |  0.92    Ca    9.3      27 Jan 2020 15:09  Phos  1.2     01-27  Mg     2.1     01-27    TPro  7.0  /  Alb  4.0  /  TBili  0.1<L>  /  DBili  x   /  AST  15  /  ALT  18  /  AlkPhos  89  01-27    CAPILLARY BLOOD GLUCOSE      POCT Blood Glucose.: 300 mg/dL (28 Jan 2020 00:43)  POCT Blood Glucose.: 262 mg/dL (27 Jan 2020 23:06)  POCT Blood Glucose.: 229 mg/dL (27 Jan 2020 19:09)  POCT Blood Glucose.: 304 mg/dL (27 Jan 2020 13:39)  POCT Blood Glucose.: 461 mg/dL (27 Jan 2020 10:15)  POCT Blood Glucose.: 410 mg/dL (27 Jan 2020 10:13)  POCT Blood Glucose.: 344 mg/dL (27 Jan 2020 09:46)    LIVER FUNCTIONS - ( 27 Jan 2020 15:09 )  Alb: 4.0 g/dL / Pro: 7.0 g/dL / ALK PHOS: 89 U/L / ALT: 18 U/L / AST: 15 U/L / GGT: x                       RADIOLOGY AND ADDITIONAL TESTS:    CONSULTANT NOTES REVIEWED:    CARE DISCUSSED WITH THE FOLLOWING CONSULTANTS/PROVIDERS: Contact Information:  Lala Reddy II, MD, MPH  PGY-1, Internal Medicine  Pager: [phone] ([facility]) /// [phone] ([facility])    AFTAB BASS, MRN-[id number]    Patient is a 64y old  Female who presents with a chief complaint of change in mental status (27 Jan 2020 16:37)      OVERNIGHT EVENTS: Overnight, patient was combative, perseverating on being transported to the 4th floor. Received haloperidol.     SUBJECTIVE: Patient evaluated at bedside, demonstrating extreme perseveration regarding her current location. Denies any other complaints.    ROS negative.      OBJECTIVE:  Vital Signs Last 24 Hrs  T(C): --  T(F): --  HR: --  BP: --  BP(mean): --  RR: --  SpO2: --  I&O's Summary    26 Jan 2020 07:01  -  27 Jan 2020 07:00  --------------------------------------------------------  IN: 300 mL / OUT: 0 mL / NET: 300 mL        MEDICATIONS  (STANDING):  dextrose 5%. 1000 milliLiter(s) (50 mL/Hr) IV Continuous <Continuous>  dextrose 50% Injectable 12.5 Gram(s) IV Push once  dextrose 50% Injectable 25 Gram(s) IV Push once  dextrose 50% Injectable 25 Gram(s) IV Push once  enoxaparin Injectable 40 milliGRAM(s) SubCutaneous every 12 hours  influenza   Vaccine 0.5 milliLiter(s) IntraMuscular once  insulin glargine Injectable (LANTUS) 30 Unit(s) SubCutaneous every morning  insulin lispro (HumaLOG) corrective regimen sliding scale   SubCutaneous three times a day before meals  insulin lispro (HumaLOG) corrective regimen sliding scale   SubCutaneous at bedtime  insulin lispro Injectable (HumaLOG) 8 Unit(s) SubCutaneous three times a day before meals  levothyroxine Injectable 100 MICROGram(s) IV Push at bedtime  metoprolol succinate ER 50 milliGRAM(s) Oral daily  pantoprazole    Tablet 40 milliGRAM(s) Oral before breakfast  predniSONE   Tablet 15 milliGRAM(s) Oral daily  primidone 50 milliGRAM(s) Oral at bedtime  sildenafil (REVATIO) 20 milliGRAM(s) Oral three times a day  simvastatin 20 milliGRAM(s) Oral at bedtime    MEDICATIONS  (PRN):  dextrose 40% Gel 15 Gram(s) Oral once PRN Blood Glucose LESS THAN 70 milliGRAM(s)/deciLiter  glucagon  Injectable 1 milliGRAM(s) IntraMuscular once PRN Glucose <70 milliGRAM(s)/deciLiter  haloperidol    Injectable 2 milliGRAM(s) IntraMuscular every 4 hours PRN Agitation  haloperidol    Injectable 2 milliGRAM(s) IV Push every 4 hours PRN Agitation  melatonin 3 milliGRAM(s) Oral at bedtime PRN Insomnia    Allergies    No Known Allergies    Intolerances    Seroquel (Other)      CONSTITUTIONAL: No acute distress. Awake and alert.  HEAD: No evidence of trauma. Structures WNL.  EYES: +PERRL. +EOMI. No scleral icterus. No conjunctival injection.  ENT: Moist oral mucosa. No erythema. No pharyngeal exudates.   NECK: Supple. Appropriate ROM. No stiffness. No masses or lymphadenopathy.  RESPIRATORY: CTAB. No wheezes, rales, or rhonchi. No accessory muscle use. No apparent respiratory distress.  CARDIOVASCULAR: +S1/S2. No audible S3/S4. Regular rate and rhythm. No murmurs, rubs, or gallops. 2+ radial pulses x b/l UE; 2+ DP pulses x b/l LE.   GASTROINTESTINAL: Soft, nontender, nondistended. +BS. No rebound or guarding.   BACK: No spinal or paraspinal tenderness. No CVA tenderness.  EXTREMITY: No LE swelling or edema. EXTs warm to touch.  MUSCULOSKELETAL: Movement evident in all limbs. No tenderness on palpation.  DERMATOLOGICAL: No abnormal rashes or lesions.  NEUROLOGICAL: CN 2-12 grossly intact. No focal deficits. Sensation intact x 4EXT. A&Ox3 (oriented to person, place, and time).  PSYCHIATRIC: Appropriate affect.                            9.3    11.15 )-----------( 327      ( 27 Jan 2020 15:09 )             30.0     PT/INR - ( 27 Jan 2020 15:09 )   PT: 10.7 sec;   INR: 0.94 ratio         PTT - ( 27 Jan 2020 15:09 )  PTT:25.6 sec  01-27    140  |  104  |  11  ----------------------------<  353<H>  4.2   |  19<L>  |  0.92    Ca    9.3      27 Jan 2020 15:09  Phos  1.2     01-27  Mg     2.1     01-27    TPro  7.0  /  Alb  4.0  /  TBili  0.1<L>  /  DBili  x   /  AST  15  /  ALT  18  /  AlkPhos  89  01-27    CAPILLARY BLOOD GLUCOSE      POCT Blood Glucose.: 300 mg/dL (28 Jan 2020 00:43)  POCT Blood Glucose.: 262 mg/dL (27 Jan 2020 23:06)  POCT Blood Glucose.: 229 mg/dL (27 Jan 2020 19:09)  POCT Blood Glucose.: 304 mg/dL (27 Jan 2020 13:39)  POCT Blood Glucose.: 461 mg/dL (27 Jan 2020 10:15)  POCT Blood Glucose.: 410 mg/dL (27 Jan 2020 10:13)  POCT Blood Glucose.: 344 mg/dL (27 Jan 2020 09:46)    LIVER FUNCTIONS - ( 27 Jan 2020 15:09 )  Alb: 4.0 g/dL / Pro: 7.0 g/dL / ALK PHOS: 89 U/L / ALT: 18 U/L / AST: 15 U/L / GGT: x                       RADIOLOGY AND ADDITIONAL TESTS:    CONSULTANT NOTES REVIEWED:    CARE DISCUSSED WITH THE FOLLOWING CONSULTANTS/PROVIDERS: Contact Information:  Lala Reddy II, MD, MPH  PGY-1, Internal Medicine  Pager: [phone] ([facility]) /// [phone] ([facility])    AFTAB BASS, MRN-[id number]    Patient is a 64y old  Female who presents with a chief complaint of change in mental status (27 Jan 2020 16:37)      OVERNIGHT EVENTS: Overnight, patient was combative, perseverating on being transported to the 4th floor. Received haloperidol.     SUBJECTIVE: Patient evaluated at bedside, demonstrating extreme perseveration regarding her current location. Denies any other complaints. Refuses most interventions.    Unable to obtain ROS due to patient mental status, lack of cooperation.       OBJECTIVE:  Vital Signs Last 24 Hrs  T(C): --  T(F): --  HR: --  BP: --  BP(mean): --  RR: --  SpO2: --  I&O's Summary    26 Jan 2020 07:01  -  27 Jan 2020 07:00  --------------------------------------------------------  IN: 300 mL / OUT: 0 mL / NET: 300 mL        MEDICATIONS  (STANDING):  dextrose 5%. 1000 milliLiter(s) (50 mL/Hr) IV Continuous <Continuous>  dextrose 50% Injectable 12.5 Gram(s) IV Push once  dextrose 50% Injectable 25 Gram(s) IV Push once  dextrose 50% Injectable 25 Gram(s) IV Push once  enoxaparin Injectable 40 milliGRAM(s) SubCutaneous every 12 hours  influenza   Vaccine 0.5 milliLiter(s) IntraMuscular once  insulin glargine Injectable (LANTUS) 30 Unit(s) SubCutaneous every morning  insulin lispro (HumaLOG) corrective regimen sliding scale   SubCutaneous three times a day before meals  insulin lispro (HumaLOG) corrective regimen sliding scale   SubCutaneous at bedtime  insulin lispro Injectable (HumaLOG) 8 Unit(s) SubCutaneous three times a day before meals  levothyroxine Injectable 100 MICROGram(s) IV Push at bedtime  metoprolol succinate ER 50 milliGRAM(s) Oral daily  pantoprazole    Tablet 40 milliGRAM(s) Oral before breakfast  predniSONE   Tablet 15 milliGRAM(s) Oral daily  primidone 50 milliGRAM(s) Oral at bedtime  sildenafil (REVATIO) 20 milliGRAM(s) Oral three times a day  simvastatin 20 milliGRAM(s) Oral at bedtime    MEDICATIONS  (PRN):  dextrose 40% Gel 15 Gram(s) Oral once PRN Blood Glucose LESS THAN 70 milliGRAM(s)/deciLiter  glucagon  Injectable 1 milliGRAM(s) IntraMuscular once PRN Glucose <70 milliGRAM(s)/deciLiter  haloperidol    Injectable 2 milliGRAM(s) IntraMuscular every 4 hours PRN Agitation  haloperidol    Injectable 2 milliGRAM(s) IV Push every 4 hours PRN Agitation  melatonin 3 milliGRAM(s) Oral at bedtime PRN Insomnia    Allergies    No Known Allergies    Intolerances    Seroquel (Other)      CONSTITUTIONAL: Combative, no acute distress.  RESPIRATORY: CTAB in anterior fields.   CARDIOVASCULAR: Unable to assess due to patient refusal.  GASTROINTESTINAL: +ABD dressing due to evisceration of wound.  BACK: No spinal or paraspinal tenderness. No CVA tenderness.  EXTREMITY: No LE swelling or edema. EXTs warm to touch.  MUSCULOSKELETAL: Movement evident in all limbs. No tenderness on palpation.  DERMATOLOGICAL: No abnormal rashes or lesions.  NEUROLOGICAL: CN 2-12 grossly intact. No focal deficits. Sensation intact x 4EXT. A&Ox3 (oriented to person, place, and time).  PSYCHIATRIC: Appropriate affect.                            9.3    11.15 )-----------( 327      ( 27 Jan 2020 15:09 )             30.0     PT/INR - ( 27 Jan 2020 15:09 )   PT: 10.7 sec;   INR: 0.94 ratio         PTT - ( 27 Jan 2020 15:09 )  PTT:25.6 sec  01-27    140  |  104  |  11  ----------------------------<  353<H>  4.2   |  19<L>  |  0.92    Ca    9.3      27 Jan 2020 15:09  Phos  1.2     01-27  Mg     2.1     01-27    TPro  7.0  /  Alb  4.0  /  TBili  0.1<L>  /  DBili  x   /  AST  15  /  ALT  18  /  AlkPhos  89  01-27    CAPILLARY BLOOD GLUCOSE      POCT Blood Glucose.: 300 mg/dL (28 Jan 2020 00:43)  POCT Blood Glucose.: 262 mg/dL (27 Jan 2020 23:06)  POCT Blood Glucose.: 229 mg/dL (27 Jan 2020 19:09)  POCT Blood Glucose.: 304 mg/dL (27 Jan 2020 13:39)  POCT Blood Glucose.: 461 mg/dL (27 Jan 2020 10:15)  POCT Blood Glucose.: 410 mg/dL (27 Jan 2020 10:13)  POCT Blood Glucose.: 344 mg/dL (27 Jan 2020 09:46)    LIVER FUNCTIONS - ( 27 Jan 2020 15:09 )  Alb: 4.0 g/dL / Pro: 7.0 g/dL / ALK PHOS: 89 U/L / ALT: 18 U/L / AST: 15 U/L / GGT: x                       RADIOLOGY AND ADDITIONAL TESTS:    CONSULTANT NOTES REVIEWED:    CARE DISCUSSED WITH THE FOLLOWING CONSULTANTS/PROVIDERS: Contact Information:  Lala Reddy II, MD, MPH  PGY-1, Internal Medicine  Pager: [phone] ([facility]) /// [phone] ([facility])    AFTAB BASS, MRN-[id number]    Patient is a 64y old  Female who presents with a chief complaint of change in mental status (27 Jan 2020 16:37)      OVERNIGHT EVENTS: Overnight, patient was combative, perseverating on being transported to the 4th floor. Received haloperidol.     SUBJECTIVE: Patient evaluated at bedside, demonstrating extreme perseveration regarding her current location. Denies any other complaints. Refuses most interventions.    Unable to obtain ROS due to patient mental status, lack of cooperation.       OBJECTIVE:  Vital Signs Last 24 Hrs  T(C): --  T(F): --  HR: --  BP: --  BP(mean): --  RR: --  SpO2: --  I&O's Summary    26 Jan 2020 07:01  -  27 Jan 2020 07:00  --------------------------------------------------------  IN: 300 mL / OUT: 0 mL / NET: 300 mL        MEDICATIONS  (STANDING):  dextrose 5%. 1000 milliLiter(s) (50 mL/Hr) IV Continuous <Continuous>  dextrose 50% Injectable 12.5 Gram(s) IV Push once  dextrose 50% Injectable 25 Gram(s) IV Push once  dextrose 50% Injectable 25 Gram(s) IV Push once  enoxaparin Injectable 40 milliGRAM(s) SubCutaneous every 12 hours  influenza   Vaccine 0.5 milliLiter(s) IntraMuscular once  insulin glargine Injectable (LANTUS) 30 Unit(s) SubCutaneous every morning  insulin lispro (HumaLOG) corrective regimen sliding scale   SubCutaneous three times a day before meals  insulin lispro (HumaLOG) corrective regimen sliding scale   SubCutaneous at bedtime  insulin lispro Injectable (HumaLOG) 8 Unit(s) SubCutaneous three times a day before meals  levothyroxine Injectable 100 MICROGram(s) IV Push at bedtime  metoprolol succinate ER 50 milliGRAM(s) Oral daily  pantoprazole    Tablet 40 milliGRAM(s) Oral before breakfast  predniSONE   Tablet 15 milliGRAM(s) Oral daily  primidone 50 milliGRAM(s) Oral at bedtime  sildenafil (REVATIO) 20 milliGRAM(s) Oral three times a day  simvastatin 20 milliGRAM(s) Oral at bedtime    MEDICATIONS  (PRN):  dextrose 40% Gel 15 Gram(s) Oral once PRN Blood Glucose LESS THAN 70 milliGRAM(s)/deciLiter  glucagon  Injectable 1 milliGRAM(s) IntraMuscular once PRN Glucose <70 milliGRAM(s)/deciLiter  haloperidol    Injectable 2 milliGRAM(s) IntraMuscular every 4 hours PRN Agitation  haloperidol    Injectable 2 milliGRAM(s) IV Push every 4 hours PRN Agitation  melatonin 3 milliGRAM(s) Oral at bedtime PRN Insomnia    Allergies    No Known Allergies    Intolerances    Seroquel (Other)      CONSTITUTIONAL: Combative, no acute distress.  RESPIRATORY: CTAB in anterior fields.   CARDIOVASCULAR: Unable to assess due to patient refusal.  GASTROINTESTINAL: +ABD dressing due to evisceration of wound.  BACK: No spinal or paraspinal tenderness. No CVA tenderness.  EXTREMITY: No LE swelling or edema. EXTs warm to touch.  MUSCULOSKELETAL: Unable to assess due to patient refusal.  DERMATOLOGICAL: Chronic LE skin changes. UE ecchymoses.  NEUROLOGICAL: Global extremity deficits.  PSYCHIATRIC: Belligerent.                            9.3    11.15 )-----------( 327      ( 27 Jan 2020 15:09 )             30.0     PT/INR - ( 27 Jan 2020 15:09 )   PT: 10.7 sec;   INR: 0.94 ratio         PTT - ( 27 Jan 2020 15:09 )  PTT:25.6 sec  01-27    140  |  104  |  11  ----------------------------<  353<H>  4.2   |  19<L>  |  0.92    Ca    9.3      27 Jan 2020 15:09  Phos  1.2     01-27  Mg     2.1     01-27    TPro  7.0  /  Alb  4.0  /  TBili  0.1<L>  /  DBili  x   /  AST  15  /  ALT  18  /  AlkPhos  89  01-27    CAPILLARY BLOOD GLUCOSE      POCT Blood Glucose.: 300 mg/dL (28 Jan 2020 00:43)  POCT Blood Glucose.: 262 mg/dL (27 Jan 2020 23:06)  POCT Blood Glucose.: 229 mg/dL (27 Jan 2020 19:09)  POCT Blood Glucose.: 304 mg/dL (27 Jan 2020 13:39)  POCT Blood Glucose.: 461 mg/dL (27 Jan 2020 10:15)  POCT Blood Glucose.: 410 mg/dL (27 Jan 2020 10:13)  POCT Blood Glucose.: 344 mg/dL (27 Jan 2020 09:46)    LIVER FUNCTIONS - ( 27 Jan 2020 15:09 )  Alb: 4.0 g/dL / Pro: 7.0 g/dL / ALK PHOS: 89 U/L / ALT: 18 U/L / AST: 15 U/L / GGT: x                       RADIOLOGY AND ADDITIONAL TESTS:    CONSULTANT NOTES REVIEWED:    CARE DISCUSSED WITH THE FOLLOWING CONSULTANTS/PROVIDERS:

## 2020-01-28 NOTE — PROGRESS NOTE ADULT - PROBLEM SELECTOR PLAN 2
Hgb a1c of 9.2. Uncontrolled due to insulin nonadherence.      -continue  lantus 30 units qAM   -increase humalog to 10 units tidac   -continue  sliding scale to moderate dose sliding scale tidac and qhs      Discharge  basal/bolus TBD    Dr. Huseyin Traore, endocrine fellow  Pager  from 9-5PM. After hours and on weekends please call [phone] Hgb a1c of 9.2. Uncontrolled due to insulin nonadherence.      -continue lantus 30 units qAM   -increase humalog to 10 units tidac   -continue  sliding scale to moderate dose sliding scale tidac and qhs      Discharge  basal/bolus TBD    Dr. Huseyin Traore, endocrine fellow  Pager  from 9-5PM. After hours and on weekends please call [phone]

## 2020-01-28 NOTE — PROGRESS NOTE ADULT - ASSESSMENT
64 year old female with history of functional paraplegia, hypothyroidism, pulmonary hypertension, COPD, RA on chronic prednisone, SBO s/p ex lap with lysis of adhesions c/b multifocal pneumonia consulted for inpatient hypothyroidism management and DM Management.  Patient has no capacity to refuse treatments.

## 2020-01-28 NOTE — PROGRESS NOTE ADULT - PROBLEM SELECTOR PLAN 4
- Moderate pulmonary HTN per TTE (1/13)  - C/w Sildenafil 20mg TID, Toprol 50 - Moderate pulmonary HTN per TTE (1/13)  - Per son, sildenafil was discontinued   - C/w Toprol 50

## 2020-01-28 NOTE — PROGRESS NOTE ADULT - PROBLEM SELECTOR PLAN 5
-  with slight AG (18), BHB 4 (elevated) on presentation; had received insulin prior to presentation  - -300  - Low suspicion for DKA as labs not suggestive of it; etiology of ketones unclear  - Lantus 30U, Humalog 8U TID, moderate ISS -  with slight AG (18), BHB 4 (elevated) on presentation; had received insulin prior to presentation  - -300 (most recent value 155)  - Low suspicion for DKA as labs not suggestive of it; etiology of ketones unclear  - Lantus 30U, Humalog increased to 9U TID, moderate ISS -  with slight AG (18), BHB 4 (elevated) on presentation; had received insulin prior to presentation  - -300 (most recent value 155)  - Low suspicion for DKA as labs not suggestive of it; etiology of ketones unclear  - Lantus 30U, Humalog increased to 10U TID, moderate ISS

## 2020-01-28 NOTE — PROGRESS NOTE ADULT - ASSESSMENT
64f hx arthritis, hypothyroidism, pulmonary HTN, DM on insulin, COPD/CHRIS on noctournal CPAP and 3L home), RA on chronic prednisone, chronic tremors, SBO s/p ex lap with lysis of adhesions (4 retention sutures) c/b eviscerated 2/2 a coughing episode s/p ex lap (6 retention sutures) c/b multifocal pneumonia, developed hypercapnic respiratory failure (requiring intubation 1/6/19, s/p bronch 1/9/19, and extubated on 1/24/19, course further c/b Enterococci bacteremia), p/w agitation 3 weeks of unclear etiology, found to be significantly hypothyroid (), extremely combative and paranoid of family and healthcare staff.

## 2020-01-28 NOTE — PROGRESS NOTE ADULT - PROBLEM SELECTOR PLAN 1
- Per son, mental status change for 3-4w, characterized by increased aggressiveness and distrust of family and home health aids  - Ddx: Psychiatric/paranoia vs endocrinopathy (given abnormal thyroid dysfunction) vs infectious (unlikely as patient stable, Bcx negative)  - Highly aggressive and physical  - Seen by Neuro, no acute intervention  - CT from previous admission benign, consider MRI if condition worsens  - Per Psych, DOES NOT HAVE CAPACITY TO LEAVE OR REFUSE MEDS; recommends Seroquel 25mg TID + Haldol 2mg IV or IM Q4H for agitation; patient intolerant of Seroquel, will discuss substitute  - Will follow-up Rheumatology to assess if her RA is involved in her presenting symptoms - Per Rheum, no rheumatological involvement  - Per Neuro, no further neurological intervention  - Per son, mental status change for 3-4w, characterized by increased aggressiveness and distrust of family and home health aids  - Ddx: Endocrinopathy (given abnormal thyroid dysfunction) vs psychiatric vs infectious (unlikely as patient stable, Bcx negative)  - Highly aggressive and physical  - Seen by Neuro, no acute intervention  - CT from previous admission benign, consider MRI if condition worsens  - Per Psych, DOES NOT HAVE CAPACITY TO LEAVE OR REFUSE MEDS; recommends Seroquel 25mg TID + Haldol 2mg IV or IM Q4H for agitation; patient intolerant of Seroquel, will discuss substitute

## 2020-01-28 NOTE — PROGRESS NOTE ADULT - PROBLEM SELECTOR PLAN 3
- Bcx + for GPR on 1/12 per patient records   - On presentation, tachycardia, otherwise not meeting SIRS criteria  - +Bcx from 1/12 sent to [facility] laboratories, currently no results;  - Will monitor off Abx

## 2020-01-28 NOTE — PROGRESS NOTE ADULT - SUBJECTIVE AND OBJECTIVE BOX
Chief Complaint: T2DM     History: Patient said she did not eat anything for breakfast this morning. Currently eating grill cheese for lunch.     MEDICATIONS  (STANDING):  budesonide  80 MICROgram(s)/formoterol 4.5 MICROgram(s) Inhaler 2 Puff(s) Inhalation two times a day  dextrose 5%. 1000 milliLiter(s) (50 mL/Hr) IV Continuous <Continuous>  dextrose 50% Injectable 12.5 Gram(s) IV Push once  dextrose 50% Injectable 25 Gram(s) IV Push once  dextrose 50% Injectable 25 Gram(s) IV Push once  enoxaparin Injectable 40 milliGRAM(s) SubCutaneous every 12 hours  influenza   Vaccine 0.5 milliLiter(s) IntraMuscular once  insulin glargine Injectable (LANTUS) 30 Unit(s) SubCutaneous every morning  insulin lispro (HumaLOG) corrective regimen sliding scale   SubCutaneous three times a day before meals  insulin lispro (HumaLOG) corrective regimen sliding scale   SubCutaneous at bedtime  insulin lispro Injectable (HumaLOG) 8 Unit(s) SubCutaneous three times a day before meals  levothyroxine Injectable 100 MICROGram(s) IV Push at bedtime  metoprolol succinate ER 50 milliGRAM(s) Oral daily  pantoprazole    Tablet 40 milliGRAM(s) Oral before breakfast  predniSONE   Tablet 15 milliGRAM(s) Oral daily  primidone 50 milliGRAM(s) Oral at bedtime  simvastatin 20 milliGRAM(s) Oral at bedtime    MEDICATIONS  (PRN):  albuterol/ipratropium for Nebulization 3 milliLiter(s) Nebulizer every 6 hours PRN Shortness of Breath and/or Wheezing  dextrose 40% Gel 15 Gram(s) Oral once PRN Blood Glucose LESS THAN 70 milliGRAM(s)/deciLiter  glucagon  Injectable 1 milliGRAM(s) IntraMuscular once PRN Glucose <70 milliGRAM(s)/deciLiter  haloperidol    Injectable 2 milliGRAM(s) IntraMuscular every 4 hours PRN Agitation  haloperidol    Injectable 2 milliGRAM(s) IV Push every 4 hours PRN Agitation  melatonin 3 milliGRAM(s) Oral at bedtime PRN Insomnia      Allergies    No Known Allergies    Intolerances    Seroquel (Other)      PHYSICAL EXAM:  VITALS: T(C): --  T(F): --  HR: 107 (01-28-20 @ 10:50) (107 - 107)  BP: 152/77 (01-28-20 @ 10:50) (152/77 - 152/77)  RR:  (99 - 99)  SpO2: --  Wt(kg): --  GENERAL: NAD, well-groomed, well-developed  EYES: No proptosis,  anicteric  HEENT:  Atraumatic, Normocephalic, moist mucous membranes  RESPIRATORY: Clear to auscultation bilaterally; No rales, rhonchi, wheezing, or rubs  CARDIOVASCULAR: Regular rate and rhythm; No murmurs  NEURO: AOx1, moves all extremities spontaenuously   PSYCH:  reactive affect, euthymic mood      POCT Blood Glucose.: 155 mg/dL (01-28-20 @ 14:53)  POCT Blood Glucose.: 213 mg/dL (01-28-20 @ 09:21)  POCT Blood Glucose.: 300 mg/dL (01-28-20 @ 00:43)  POCT Blood Glucose.: 262 mg/dL (01-27-20 @ 23:06)  POCT Blood Glucose.: 229 mg/dL (01-27-20 @ 19:09)  POCT Blood Glucose.: 304 mg/dL (01-27-20 @ 13:39)  POCT Blood Glucose.: 461 mg/dL (01-27-20 @ 10:15)  POCT Blood Glucose.: 410 mg/dL (01-27-20 @ 10:13)  POCT Blood Glucose.: 344 mg/dL (01-27-20 @ 09:46)  POCT Blood Glucose.: 366 mg/dL (01-26-20 @ 14:04)  POCT Blood Glucose.: 323 mg/dL (01-26-20 @ 10:09)      01-28    140  |  108  |  10  ----------------------------<  149<H>  4.1   |  19<L>  |  0.80    EGFR if : 92  EGFR if non : 79    Ca    9.3      01-28  Mg     2.2     01-28  Phos  2.1     01-28    TPro  7.0  /  Alb  4.0  /  TBili  0.1<L>  /  DBili  x   /  AST  15  /  ALT  18  /  AlkPhos  89  01-27          Thyroid Function Tests:  01-27 @ 17:49 TSH 52.30 FreeT4 -- T3 29 Anti TPO -- Anti Thyroglobulin Ab -- TSI --  01-27 @ 17:42 TSH -- FreeT4 -- T3 -- Anti .0 Anti Thyroglobulin Ab -- TSI --      Hemoglobin A1C, Whole Blood: 9.2 % <H> [4.0 - 5.6] (01-27-20 @ 17:36) Chief Complaint: T2DM     History: Patient said she did not eat anything for breakfast this morning. Currently eating grilled cheese for lunch.     MEDICATIONS  (STANDING):  budesonide  80 MICROgram(s)/formoterol 4.5 MICROgram(s) Inhaler 2 Puff(s) Inhalation two times a day  dextrose 5%. 1000 milliLiter(s) (50 mL/Hr) IV Continuous <Continuous>  dextrose 50% Injectable 12.5 Gram(s) IV Push once  dextrose 50% Injectable 25 Gram(s) IV Push once  dextrose 50% Injectable 25 Gram(s) IV Push once  enoxaparin Injectable 40 milliGRAM(s) SubCutaneous every 12 hours  influenza   Vaccine 0.5 milliLiter(s) IntraMuscular once  insulin glargine Injectable (LANTUS) 30 Unit(s) SubCutaneous every morning  insulin lispro (HumaLOG) corrective regimen sliding scale   SubCutaneous three times a day before meals  insulin lispro (HumaLOG) corrective regimen sliding scale   SubCutaneous at bedtime  insulin lispro Injectable (HumaLOG) 8 Unit(s) SubCutaneous three times a day before meals  levothyroxine Injectable 100 MICROGram(s) IV Push at bedtime  metoprolol succinate ER 50 milliGRAM(s) Oral daily  pantoprazole    Tablet 40 milliGRAM(s) Oral before breakfast  predniSONE   Tablet 15 milliGRAM(s) Oral daily  primidone 50 milliGRAM(s) Oral at bedtime  simvastatin 20 milliGRAM(s) Oral at bedtime    MEDICATIONS  (PRN):  albuterol/ipratropium for Nebulization 3 milliLiter(s) Nebulizer every 6 hours PRN Shortness of Breath and/or Wheezing  dextrose 40% Gel 15 Gram(s) Oral once PRN Blood Glucose LESS THAN 70 milliGRAM(s)/deciLiter  glucagon  Injectable 1 milliGRAM(s) IntraMuscular once PRN Glucose <70 milliGRAM(s)/deciLiter  haloperidol    Injectable 2 milliGRAM(s) IntraMuscular every 4 hours PRN Agitation  haloperidol    Injectable 2 milliGRAM(s) IV Push every 4 hours PRN Agitation  melatonin 3 milliGRAM(s) Oral at bedtime PRN Insomnia      Allergies    No Known Allergies    Intolerances    Seroquel (Other)      PHYSICAL EXAM:  VITALS: T(C): --  T(F): --  HR: 107 (01-28-20 @ 10:50) (107 - 107)  BP: 152/77 (01-28-20 @ 10:50) (152/77 - 152/77)  RR:  (99 - 99)  SpO2: --  Wt(kg): --  GENERAL: NAD, well-groomed, well-developed  EYES: No proptosis,  anicteric  HEENT:  Atraumatic, Normocephalic, moist mucous membranes  RESPIRATORY: Clear to auscultation bilaterally; No rales, rhonchi, wheezing, or rubs  CARDIOVASCULAR: Regular rate and rhythm; No murmurs  NEURO: AOx1, moves all extremities spontaenuously   PSYCH:  reactive affect, euthymic mood      POCT Blood Glucose.: 155 mg/dL (01-28-20 @ 14:53)  POCT Blood Glucose.: 213 mg/dL (01-28-20 @ 09:21)  POCT Blood Glucose.: 300 mg/dL (01-28-20 @ 00:43)  POCT Blood Glucose.: 262 mg/dL (01-27-20 @ 23:06)  POCT Blood Glucose.: 229 mg/dL (01-27-20 @ 19:09)  POCT Blood Glucose.: 304 mg/dL (01-27-20 @ 13:39)  POCT Blood Glucose.: 461 mg/dL (01-27-20 @ 10:15)  POCT Blood Glucose.: 410 mg/dL (01-27-20 @ 10:13)  POCT Blood Glucose.: 344 mg/dL (01-27-20 @ 09:46)  POCT Blood Glucose.: 366 mg/dL (01-26-20 @ 14:04)  POCT Blood Glucose.: 323 mg/dL (01-26-20 @ 10:09)      01-28    140  |  108  |  10  ----------------------------<  149<H>  4.1   |  19<L>  |  0.80    EGFR if : 92  EGFR if non : 79    Ca    9.3      01-28  Mg     2.2     01-28  Phos  2.1     01-28    TPro  7.0  /  Alb  4.0  /  TBili  0.1<L>  /  DBili  x   /  AST  15  /  ALT  18  /  AlkPhos  89  01-27          Thyroid Function Tests:  01-27 @ 17:49 TSH 52.30 FreeT4 -- T3 29 Anti TPO -- Anti Thyroglobulin Ab -- TSI --  01-27 @ 17:42 TSH -- FreeT4 -- T3 -- Anti .0 Anti Thyroglobulin Ab -- TSI --      Hemoglobin A1C, Whole Blood: 9.2 % <H> [4.0 - 5.6] (01-27-20 @ 17:36)

## 2020-01-28 NOTE — CONSULT NOTE ADULT - SUBJECTIVE AND OBJECTIVE BOX
AFTAB BASS  [id number]    HISTORY OF PRESENT ILLNESS:    63 yo F PMH RA (on naproxen and predisone qd for years), hypothyroidism, lower extremity paralysis, DM2, COPD presenting for acute encephalopathy.  Patient having delusions and acting aggressively.  Found to have  on admission, has been downtrending since being started on IV synthroid per endo.  We are consulted to r/o RA flare given elevated ESR and positive RF on admission.  Patient currently denies joint pain, swelling, stiffness, or warmth.  Patient says she has not had RF flare in "years" and has had no recent changes in her medications (dx with RA when she was 19 years old).    PAST MEDICAL & SURGICAL HISTORY:  Diverticulosis  RA (Rheumatoid Arthritis)  Tooth Abscess  Arthritis  Cigarette Smoker  Chronic Asthma  Adult Hypothyroidism  Borderline Diabetes Mellitus  High Cholesterol  H/O: HTN  Wrist Disorder: S/P Surgery  History of  Section      Review of Systems:  Gen:  No fevers/chills, weight loss  HEENT: No blurry vision, no difficulty swallowing  CVS: No chest pain/palpitations  Resp: No SOB/wheezing  GI: No N/V/C/D/abdominal pain  MSK:  Skin: No new rashes  Neuro: No headaches    MEDICATIONS  (STANDING):  budesonide  80 MICROgram(s)/formoterol 4.5 MICROgram(s) Inhaler 2 Puff(s) Inhalation two times a day  dextrose 5%. 1000 milliLiter(s) (50 mL/Hr) IV Continuous <Continuous>  dextrose 50% Injectable 12.5 Gram(s) IV Push once  dextrose 50% Injectable 25 Gram(s) IV Push once  dextrose 50% Injectable 25 Gram(s) IV Push once  enoxaparin Injectable 40 milliGRAM(s) SubCutaneous every 12 hours  influenza   Vaccine 0.5 milliLiter(s) IntraMuscular once  insulin glargine Injectable (LANTUS) 30 Unit(s) SubCutaneous every morning  insulin lispro (HumaLOG) corrective regimen sliding scale   SubCutaneous three times a day before meals  insulin lispro (HumaLOG) corrective regimen sliding scale   SubCutaneous at bedtime  insulin lispro Injectable (HumaLOG) 8 Unit(s) SubCutaneous three times a day before meals  levothyroxine Injectable 100 MICROGram(s) IV Push at bedtime  metoprolol succinate ER 50 milliGRAM(s) Oral daily  pantoprazole    Tablet 40 milliGRAM(s) Oral before breakfast  predniSONE   Tablet 15 milliGRAM(s) Oral daily  primidone 50 milliGRAM(s) Oral at bedtime  sildenafil (REVATIO) 20 milliGRAM(s) Oral three times a day  simvastatin 20 milliGRAM(s) Oral at bedtime    MEDICATIONS  (PRN):  albuterol/ipratropium for Nebulization 3 milliLiter(s) Nebulizer every 6 hours PRN Shortness of Breath and/or Wheezing  dextrose 40% Gel 15 Gram(s) Oral once PRN Blood Glucose LESS THAN 70 milliGRAM(s)/deciLiter  glucagon  Injectable 1 milliGRAM(s) IntraMuscular once PRN Glucose <70 milliGRAM(s)/deciLiter  haloperidol    Injectable 2 milliGRAM(s) IntraMuscular every 4 hours PRN Agitation  haloperidol    Injectable 2 milliGRAM(s) IV Push every 4 hours PRN Agitation  melatonin 3 milliGRAM(s) Oral at bedtime PRN Insomnia      Allergies    No Known Allergies    Intolerances    Seroquel (Other)      PERTINENT MEDICATION HISTORY:    SOCIAL HISTORY:  OCCUPATION:  TRAVEL HISTORY:    FAMILY HISTORY:  No pertinent family history in first degree relatives      Vital Signs Last 24 Hrs  T(C): --  T(F): --  HR: 107 (2020 10:50) (107 - 107)  BP: 152/77 (2020 10:50) (152/77 - 152/77)  BP(mean): --  RR: 99 (2020 10:50) (99 - 99)  SpO2: --    Daily     Daily     Physical Exam:  General: No apparent distress  HEENT: EOMI, MMM  CVS: +S1/S2, RRR  Resp: CTA b/l  GI: Soft, NT/ND  MSK:    Neuro: AAOx3  muscle strength RUE LUE ; RLE LLE   Skin: no  rashes    LABS:                        9.3    11.15 )-----------( 327      ( 2020 15:09 )             30.0     -    140  |  104  |  11  ----------------------------<  353<H>  4.2   |  19<L>  |  0.92    Ca    9.3      2020 15:09  Phos  1.2     -  Mg     2.1         TPro  7.0  /  Alb  4.0  /  TBili  0.1<L>  /  DBili  x   /  AST  15  /  ALT  18  /  AlkPhos  89  01-27    PT/INR - ( 2020 15:09 )   PT: 10.7 sec;   INR: 0.94 ratio         PTT - ( 2020 15:09 )  PTT:25.6 sec      RADIOLOGY & ADDITIONAL STUDIES:

## 2020-01-28 NOTE — PROGRESS NOTE ADULT - ATTENDING COMMENTS
Patient seen and examined with family at bedside. Patient agreeable to one set of vitals by Flor (HCP), , all other vitals stable. Refused temperature. Patient continues to be aggressive and resistant to treatment and exam. Today was able to listen to lungs anteriorly, which was clear, no noted wheezing. All other part of exam refused. Currently, as per neurology and rheum, encephalopathy likely 2/2 endocrinopathy. Labs taken yesterday (refused by patient today), showed no metabolic derangements, aside from elevated TSH, now improved from 101. TPO also positive. Will continue with synthroid dosing and continue to monitor for improvement and will obtain repeat TFTS on 1/31 as per endocrine. If mental status does not improve, will need to consider other sources. Currently, infectious source appears unlikely, no potential source of infection noted, BCX negative. If patient's status does not improve w/ synthroid, will need to consider MRI imaging w/ anesthesia and other further neurological work-up.    Patient's son also expressed interest in transferring the patient to [place], discussed with son that there has to be an accepting physician. Son states pulmonologist was willing to accept, will have team reach out.

## 2020-01-29 ENCOUNTER — TRANSCRIPTION ENCOUNTER (OUTPATIENT)
Age: 65
End: 2020-01-29

## 2020-01-29 LAB
GLUCOSE BLDC GLUCOMTR-MCNC: 126 MG/DL — HIGH (ref 70–99)
GLUCOSE BLDC GLUCOMTR-MCNC: 289 MG/DL — HIGH (ref 70–99)
GLUCOSE BLDC GLUCOMTR-MCNC: 296 MG/DL — HIGH (ref 70–99)

## 2020-01-29 PROCEDURE — 99233 SBSQ HOSP IP/OBS HIGH 50: CPT | Mod: GC

## 2020-01-29 PROCEDURE — 99232 SBSQ HOSP IP/OBS MODERATE 35: CPT | Mod: GC

## 2020-01-29 PROCEDURE — 99233 SBSQ HOSP IP/OBS HIGH 50: CPT

## 2020-01-29 RX ORDER — LANOLIN ALCOHOL/MO/W.PET/CERES
3 CREAM (GRAM) TOPICAL AT BEDTIME
Refills: 0 | Status: DISCONTINUED | OUTPATIENT
Start: 2020-01-29 | End: 2020-01-31

## 2020-01-29 RX ORDER — OLANZAPINE 15 MG/1
2.5 TABLET, FILM COATED ORAL
Refills: 0 | Status: DISCONTINUED | OUTPATIENT
Start: 2020-01-29 | End: 2020-02-05

## 2020-01-29 RX ORDER — INSULIN LISPRO 100/ML
9 VIAL (ML) SUBCUTANEOUS
Refills: 0 | Status: DISCONTINUED | OUTPATIENT
Start: 2020-01-29 | End: 2020-02-04

## 2020-01-29 RX ORDER — TRAMADOL HYDROCHLORIDE 50 MG/1
50 TABLET ORAL ONCE
Refills: 0 | Status: DISCONTINUED | OUTPATIENT
Start: 2020-01-29 | End: 2020-01-29

## 2020-01-29 RX ORDER — SODIUM,POTASSIUM PHOSPHATES 278-250MG
1 POWDER IN PACKET (EA) ORAL
Refills: 0 | Status: COMPLETED | OUTPATIENT
Start: 2020-01-29 | End: 2020-01-29

## 2020-01-29 RX ADMIN — SIMVASTATIN 20 MILLIGRAM(S): 20 TABLET, FILM COATED ORAL at 21:17

## 2020-01-29 RX ADMIN — Medication 3 MILLIGRAM(S): at 23:18

## 2020-01-29 RX ADMIN — Medication 100 MICROGRAM(S): at 21:18

## 2020-01-29 RX ADMIN — GABAPENTIN 400 MILLIGRAM(S): 400 CAPSULE ORAL at 05:54

## 2020-01-29 RX ADMIN — Medication 15 MILLIGRAM(S): at 09:32

## 2020-01-29 RX ADMIN — INSULIN GLARGINE 30 UNIT(S): 100 INJECTION, SOLUTION SUBCUTANEOUS at 11:53

## 2020-01-29 RX ADMIN — OLANZAPINE 2.5 MILLIGRAM(S): 15 TABLET, FILM COATED ORAL at 18:29

## 2020-01-29 RX ADMIN — GABAPENTIN 400 MILLIGRAM(S): 400 CAPSULE ORAL at 15:40

## 2020-01-29 RX ADMIN — TRAMADOL HYDROCHLORIDE 50 MILLIGRAM(S): 50 TABLET ORAL at 23:15

## 2020-01-29 RX ADMIN — Medication 3 MILLIGRAM(S): at 21:17

## 2020-01-29 RX ADMIN — PRIMIDONE 50 MILLIGRAM(S): 250 TABLET ORAL at 21:17

## 2020-01-29 RX ADMIN — TRAMADOL HYDROCHLORIDE 50 MILLIGRAM(S): 50 TABLET ORAL at 22:18

## 2020-01-29 RX ADMIN — Medication 6: at 13:03

## 2020-01-29 RX ADMIN — Medication 6: at 17:58

## 2020-01-29 RX ADMIN — Medication 9 UNIT(S): at 17:59

## 2020-01-29 RX ADMIN — Medication 8 UNIT(S): at 13:04

## 2020-01-29 RX ADMIN — PANTOPRAZOLE SODIUM 40 MILLIGRAM(S): 20 TABLET, DELAYED RELEASE ORAL at 05:53

## 2020-01-29 RX ADMIN — GABAPENTIN 400 MILLIGRAM(S): 400 CAPSULE ORAL at 21:20

## 2020-01-29 RX ADMIN — Medication 50 MILLIGRAM(S): at 05:55

## 2020-01-29 RX ADMIN — TRAMADOL HYDROCHLORIDE 100 MILLIGRAM(S): 50 TABLET ORAL at 09:32

## 2020-01-29 RX ADMIN — BUDESONIDE AND FORMOTEROL FUMARATE DIHYDRATE 2 PUFF(S): 160; 4.5 AEROSOL RESPIRATORY (INHALATION) at 17:36

## 2020-01-29 RX ADMIN — ENOXAPARIN SODIUM 40 MILLIGRAM(S): 100 INJECTION SUBCUTANEOUS at 11:55

## 2020-01-29 RX ADMIN — BUDESONIDE AND FORMOTEROL FUMARATE DIHYDRATE 2 PUFF(S): 160; 4.5 AEROSOL RESPIRATORY (INHALATION) at 05:39

## 2020-01-29 NOTE — PROGRESS NOTE ADULT - PROBLEM SELECTOR PLAN 3
- Bcx + for GPR on 1/12 per patient records   - On presentation, tachycardia, otherwise not meeting SIRS criteria  - +Bcx from 1/12 sent to [facility] laboratories, currently no results  - Will monitor off Abx

## 2020-01-29 NOTE — PROGRESS NOTE ADULT - ATTENDING COMMENTS
Agree with assessment and plan as above by Dr. Bull. Reviewed all pertinent labs, glucose values, and imaging studies. Modifications made as indicated above.     Marcio Dan D.O  [phone]

## 2020-01-29 NOTE — PROGRESS NOTE BEHAVIORAL HEALTH - NSBHCONSULTRECOMMENDOTHER_PSY_A_CORE FT
1) consider d/c seroquel, and replace with zyprexa 2.5 mg po BID for mood stability/paranoia and agitation; f/u QTc < 500  2) for acute agitation, continue haldol 2 mg IV Q4hr PRN, f/u QTc < 500  3) pt's confusion likely secondary to elevated TSH, f/u endocrinology recommendations  4) pt lacks capacity to refuse treatments, defer to pt's HCP   5) pt cannot leave AMA  6) no current indication for inpt psychiatric admission

## 2020-01-29 NOTE — PROGRESS NOTE ADULT - PROBLEM SELECTOR PLAN 1
- Per Rheum, no rheumatological involvement  - Per Neuro, no further neurological intervention  - Per son, mental status change for 3-4w, characterized by increased aggressiveness and distrust of family and home health aids  - Ddx: Endocrinopathy (given abnormal thyroid dysfunction) vs psychiatric vs infectious (unlikely as patient stable, Bcx negative)  - Highly aggressive and physical  - Seen by Neuro, no acute intervention  - CT from previous admission benign, consider MRI if condition worsens  - Per Psych, DOES NOT HAVE CAPACITY TO LEAVE OR REFUSE MEDS; recommends Seroquel 25mg TID + Haldol 2mg IV or IM Q4H for agitation; patient intolerant of Seroquel, will discuss substitute - Per Endo, low suspicion of Hashimoto encephalitis as LP and autoimmune labs are needed to confirm diagnosis; continue to treat hypothyroidism with synthroid  - Per Rheum and Neuro, no further workup  - Per son, mental status change for 3-4w, characterized by increased aggressiveness and distrust of family and home health aids  - Ddx: Endocrinopathy (given abnormal thyroid dysfunction) vs psychiatric vs infectious (unlikely as patient stable, Bcx negative)  - Highly aggressive and physical  - Seen by Neuro, no acute intervention  - CT from previous admission benign, consider MRI if condition worsens  - Per Psych, DOES NOT HAVE CAPACITY TO LEAVE OR REFUSE MEDS; recommends Zyprexa 2.5mg BID + Haldol 2mg IV or IM Q4H for agitation

## 2020-01-29 NOTE — PROGRESS NOTE ADULT - SUBJECTIVE AND OBJECTIVE BOX
Contact Information:  Lala Reddy II, MD, MPH  PGY-1, Internal Medicine  Pager: [phone] ([facility]) /// [phone] ([facility])    AFTAB BASS, MRN-[id number]    Patient is a 64y old  Female who presents with a chief complaint of change in mental status (28 Jan 2020 16:14)      OVERNIGHT EVENTS:    SUBJECTIVE:    CONSTITUTIONAL: No weakness. No fatigue. No fever.  HEAD: No head trauma.   EYES: No vision changes.  ENT: No hearing changes or tinnitus. No ear pain. No changes in smell. No nasal congestion or discharge. No sore throat. No voice hoarseness.   NECK: No neck pain or stiffness. No lumps.  RESPIRATORY: No cough. No SOB. No wheezing. No hemoptysis.   CARDIOVASCULAR: No chest pain. No palpitations.   GASTROINTESTINAL: No dysphagia. No ABD pain. No distension. No constipation. No diarrhea. No pain with defecation. No hematemesis. No hematochezia or melena.  BACK: No back pain.  GENITOURINARY: No dysuria. No frequency or urgency. No hesitancy. No incontinence. No urinary retention. No suprapubic pain. No hematuria.  EXTREMITY: No swelling.  MUSCULOSKELETAL: No joint pain or swelling. No fractures. No stiffness.    SKIN: No rashes. No itching. No skin, hair, or nail changes.  NEUROLOGICAL: No weakness or paralysis. No lightheadedness or dizziness. No HA. No numbness or tingling.   PSYCHIATRIC: No depression.       OBJECTIVE:  Vital Signs Last 24 Hrs  T(C): 36.9 (29 Jan 2020 05:17), Max: 36.9 (29 Jan 2020 05:17)  T(F): 98.5 (29 Jan 2020 05:17), Max: 98.5 (29 Jan 2020 05:17)  HR: 88 (29 Jan 2020 05:40) (88 - 111)  BP: 132/74 (29 Jan 2020 05:17) (132/74 - 152/77)  BP(mean): --  RR: 18 (29 Jan 2020 05:17) (18 - 99)  SpO2: 99% (29 Jan 2020 05:40) (98% - 100%)  I&O's Summary    28 Jan 2020 07:01  -  29 Jan 2020 07:00  --------------------------------------------------------  IN: 300 mL / OUT: 0 mL / NET: 300 mL        MEDICATIONS  (STANDING):  budesonide  80 MICROgram(s)/formoterol 4.5 MICROgram(s) Inhaler 2 Puff(s) Inhalation two times a day  dextrose 5%. 1000 milliLiter(s) (50 mL/Hr) IV Continuous <Continuous>  dextrose 50% Injectable 12.5 Gram(s) IV Push once  dextrose 50% Injectable 25 Gram(s) IV Push once  dextrose 50% Injectable 25 Gram(s) IV Push once  enoxaparin Injectable 40 milliGRAM(s) SubCutaneous every 12 hours  gabapentin 400 milliGRAM(s) Oral three times a day  influenza   Vaccine 0.5 milliLiter(s) IntraMuscular once  insulin glargine Injectable (LANTUS) 30 Unit(s) SubCutaneous every morning  insulin lispro (HumaLOG) corrective regimen sliding scale   SubCutaneous at bedtime  insulin lispro (HumaLOG) corrective regimen sliding scale   SubCutaneous three times a day before meals  insulin lispro Injectable (HumaLOG) 8 Unit(s) SubCutaneous three times a day before meals  levothyroxine Injectable 100 MICROGram(s) IV Push at bedtime  metoprolol succinate ER 50 milliGRAM(s) Oral daily  pantoprazole    Tablet 40 milliGRAM(s) Oral before breakfast  potassium acid phosphate/sodium acid phosphate tablet (K-PHOS No. 2) 1 Tablet(s) Oral four times a day with meals  predniSONE   Tablet 15 milliGRAM(s) Oral daily  primidone 50 milliGRAM(s) Oral at bedtime  simvastatin 20 milliGRAM(s) Oral at bedtime    MEDICATIONS  (PRN):  acetaminophen   Tablet .. 650 milliGRAM(s) Oral every 6 hours PRN Temp greater or equal to 38C (100.4F), Mild Pain (1 - 3), Moderate Pain (4 - 6)  albuterol/ipratropium for Nebulization 3 milliLiter(s) Nebulizer every 6 hours PRN Shortness of Breath and/or Wheezing  dextrose 40% Gel 15 Gram(s) Oral once PRN Blood Glucose LESS THAN 70 milliGRAM(s)/deciLiter  glucagon  Injectable 1 milliGRAM(s) IntraMuscular once PRN Glucose <70 milliGRAM(s)/deciLiter  haloperidol    Injectable 2 milliGRAM(s) IntraMuscular every 4 hours PRN Agitation  haloperidol    Injectable 2 milliGRAM(s) IV Push every 4 hours PRN Agitation  melatonin 3 milliGRAM(s) Oral at bedtime PRN Insomnia  traMADol 100 milliGRAM(s) Oral daily PRN Severe Pain (7 - 10)    Allergies    No Known Allergies    Intolerances    Seroquel (Other)      CONSTITUTIONAL: No acute distress. Awake and alert.  HEAD: No evidence of trauma. Structures WNL.  EYES: +PERRL. +EOMI. No scleral icterus. No conjunctival injection.  ENT: Moist oral mucosa. No erythema. No pharyngeal exudates.   NECK: Supple. Appropriate ROM. No stiffness. No masses or lymphadenopathy.  RESPIRATORY: CTAB. No wheezes, rales, or rhonchi. No accessory muscle use. No apparent respiratory distress.  CARDIOVASCULAR: +S1/S2. No audible S3/S4. Regular rate and rhythm. No murmurs, rubs, or gallops. 2+ radial pulses x b/l UE; 2+ DP pulses x b/l LE.   GASTROINTESTINAL: Soft, nontender, nondistended. +BS. No rebound or guarding.   BACK: No spinal or paraspinal tenderness. No CVA tenderness.  EXTREMITY: No LE swelling or edema. EXTs warm to touch.  MUSCULOSKELETAL: Movement evident in all limbs. No tenderness on palpation.  DERMATOLOGICAL: No abnormal rashes or lesions.  NEUROLOGICAL: CN 2-12 grossly intact. No focal deficits. Sensation intact x 4EXT. A&Ox3 (oriented to person, place, and time).  PSYCHIATRIC: Appropriate affect.                            9.3    11.15 )-----------( 327      ( 27 Jan 2020 15:09 )             30.0     PT/INR - ( 27 Jan 2020 15:09 )   PT: 10.7 sec;   INR: 0.94 ratio         PTT - ( 27 Jan 2020 15:09 )  PTT:25.6 sec  01-28    140  |  108  |  10  ----------------------------<  149<H>  4.1   |  19<L>  |  0.80    Ca    9.3      28 Jan 2020 15:30  Phos  2.1     01-28  Mg     2.2     01-28    TPro  7.0  /  Alb  4.0  /  TBili  0.1<L>  /  DBili  x   /  AST  15  /  ALT  18  /  AlkPhos  89  01-27    CAPILLARY BLOOD GLUCOSE      POCT Blood Glucose.: 176 mg/dL (28 Jan 2020 22:41)  POCT Blood Glucose.: 134 mg/dL (28 Jan 2020 18:43)  POCT Blood Glucose.: 155 mg/dL (28 Jan 2020 14:53)  POCT Blood Glucose.: 213 mg/dL (28 Jan 2020 09:21)    LIVER FUNCTIONS - ( 27 Jan 2020 15:09 )  Alb: 4.0 g/dL / Pro: 7.0 g/dL / ALK PHOS: 89 U/L / ALT: 18 U/L / AST: 15 U/L / GGT: x                       RADIOLOGY AND ADDITIONAL TESTS:    CONSULTANT NOTES REVIEWED:    CARE DISCUSSED WITH THE FOLLOWING CONSULTANTS/PROVIDERS: Contact Information:  Lala Reddy II, MD, MPH  PGY-1, Internal Medicine  Pager: [phone] ([facility]) /// [phone] ([facility])    AFTAB BASS, MRN-[id number]    Patient is a 64y old  Female who presents with a chief complaint of change in mental status (28 Jan 2020 16:14)      OVERNIGHT EVENTS: Patient states that she takes gabapentin 400 TID and tramadol 300. She was ordered for gabapentin and tramadol 100 daily.     SUBJECTIVE: Patient sleeping, arousable, endorses no acute complaints. Patient refusing Lantus     CONSTITUTIONAL: No weakness. No fatigue. No fever.  HEAD: No head trauma.   EYES: No vision changes.  ENT: No hearing changes or tinnitus. No ear pain. No changes in smell. No nasal congestion or discharge. No sore throat. No voice hoarseness.   NECK: No neck pain or stiffness. No lumps.  RESPIRATORY: No cough. No SOB. No wheezing. No hemoptysis.   CARDIOVASCULAR: No chest pain. No palpitations.   GASTROINTESTINAL: No dysphagia. No ABD pain. No distension. No constipation. No diarrhea. No pain with defecation. No hematemesis. No hematochezia or melena.  BACK: No back pain.  GENITOURINARY: No dysuria. No frequency or urgency. No hesitancy. No incontinence. No urinary retention. No suprapubic pain. No hematuria.  EXTREMITY: No swelling.  MUSCULOSKELETAL: No joint pain or swelling. No fractures. No stiffness.    SKIN: No rashes. No itching. No skin, hair, or nail changes.  NEUROLOGICAL: No weakness or paralysis. No lightheadedness or dizziness. No HA. No numbness or tingling.   PSYCHIATRIC: No depression.       OBJECTIVE:  Vital Signs Last 24 Hrs  T(C): 36.9 (29 Jan 2020 05:17), Max: 36.9 (29 Jan 2020 05:17)  T(F): 98.5 (29 Jan 2020 05:17), Max: 98.5 (29 Jan 2020 05:17)  HR: 88 (29 Jan 2020 05:40) (88 - 111)  BP: 132/74 (29 Jan 2020 05:17) (132/74 - 152/77)  BP(mean): --  RR: 18 (29 Jan 2020 05:17) (18 - 99)  SpO2: 99% (29 Jan 2020 05:40) (98% - 100%)  I&O's Summary    28 Jan 2020 07:01  -  29 Jan 2020 07:00  --------------------------------------------------------  IN: 300 mL / OUT: 0 mL / NET: 300 mL        MEDICATIONS  (STANDING):  budesonide  80 MICROgram(s)/formoterol 4.5 MICROgram(s) Inhaler 2 Puff(s) Inhalation two times a day  dextrose 5%. 1000 milliLiter(s) (50 mL/Hr) IV Continuous <Continuous>  dextrose 50% Injectable 12.5 Gram(s) IV Push once  dextrose 50% Injectable 25 Gram(s) IV Push once  dextrose 50% Injectable 25 Gram(s) IV Push once  enoxaparin Injectable 40 milliGRAM(s) SubCutaneous every 12 hours  gabapentin 400 milliGRAM(s) Oral three times a day  influenza   Vaccine 0.5 milliLiter(s) IntraMuscular once  insulin glargine Injectable (LANTUS) 30 Unit(s) SubCutaneous every morning  insulin lispro (HumaLOG) corrective regimen sliding scale   SubCutaneous at bedtime  insulin lispro (HumaLOG) corrective regimen sliding scale   SubCutaneous three times a day before meals  insulin lispro Injectable (HumaLOG) 8 Unit(s) SubCutaneous three times a day before meals  levothyroxine Injectable 100 MICROGram(s) IV Push at bedtime  metoprolol succinate ER 50 milliGRAM(s) Oral daily  pantoprazole    Tablet 40 milliGRAM(s) Oral before breakfast  potassium acid phosphate/sodium acid phosphate tablet (K-PHOS No. 2) 1 Tablet(s) Oral four times a day with meals  predniSONE   Tablet 15 milliGRAM(s) Oral daily  primidone 50 milliGRAM(s) Oral at bedtime  simvastatin 20 milliGRAM(s) Oral at bedtime    MEDICATIONS  (PRN):  acetaminophen   Tablet .. 650 milliGRAM(s) Oral every 6 hours PRN Temp greater or equal to 38C (100.4F), Mild Pain (1 - 3), Moderate Pain (4 - 6)  albuterol/ipratropium for Nebulization 3 milliLiter(s) Nebulizer every 6 hours PRN Shortness of Breath and/or Wheezing  dextrose 40% Gel 15 Gram(s) Oral once PRN Blood Glucose LESS THAN 70 milliGRAM(s)/deciLiter  glucagon  Injectable 1 milliGRAM(s) IntraMuscular once PRN Glucose <70 milliGRAM(s)/deciLiter  haloperidol    Injectable 2 milliGRAM(s) IntraMuscular every 4 hours PRN Agitation  haloperidol    Injectable 2 milliGRAM(s) IV Push every 4 hours PRN Agitation  melatonin 3 milliGRAM(s) Oral at bedtime PRN Insomnia  traMADol 100 milliGRAM(s) Oral daily PRN Severe Pain (7 - 10)    Allergies    No Known Allergies    Intolerances    Seroquel (Other)      CONSTITUTIONAL: No acute distress. Awake and alert.  HEAD: No evidence of trauma. Structures WNL.  EYES: +PERRL. +EOMI. No scleral icterus. No conjunctival injection.  ENT: Moist oral mucosa. No erythema. No pharyngeal exudates.   NECK: Supple. Appropriate ROM. No stiffness. No masses or lymphadenopathy.  RESPIRATORY: CTAB. No wheezes, rales, or rhonchi. No accessory muscle use. No apparent respiratory distress.  CARDIOVASCULAR: +S1/S2. No audible S3/S4. Regular rate and rhythm. No murmurs, rubs, or gallops. 2+ radial pulses x b/l UE; 2+ DP pulses x b/l LE.   GASTROINTESTINAL: Soft, nontender, nondistended. +BS. No rebound or guarding.   BACK: No spinal or paraspinal tenderness. No CVA tenderness.  EXTREMITY: No LE swelling or edema. EXTs warm to touch.  MUSCULOSKELETAL: Movement evident in all limbs. No tenderness on palpation.  DERMATOLOGICAL: No abnormal rashes or lesions.  NEUROLOGICAL: CN 2-12 grossly intact. No focal deficits. Sensation intact x 4EXT. A&Ox3 (oriented to person, place, and time).  PSYCHIATRIC: Appropriate affect.                            9.3    11.15 )-----------( 327      ( 27 Jan 2020 15:09 )             30.0     PT/INR - ( 27 Jan 2020 15:09 )   PT: 10.7 sec;   INR: 0.94 ratio         PTT - ( 27 Jan 2020 15:09 )  PTT:25.6 sec  01-28    140  |  108  |  10  ----------------------------<  149<H>  4.1   |  19<L>  |  0.80    Ca    9.3      28 Jan 2020 15:30  Phos  2.1     01-28  Mg     2.2     01-28    TPro  7.0  /  Alb  4.0  /  TBili  0.1<L>  /  DBili  x   /  AST  15  /  ALT  18  /  AlkPhos  89  01-27    CAPILLARY BLOOD GLUCOSE      POCT Blood Glucose.: 176 mg/dL (28 Jan 2020 22:41)  POCT Blood Glucose.: 134 mg/dL (28 Jan 2020 18:43)  POCT Blood Glucose.: 155 mg/dL (28 Jan 2020 14:53)  POCT Blood Glucose.: 213 mg/dL (28 Jan 2020 09:21)    LIVER FUNCTIONS - ( 27 Jan 2020 15:09 )  Alb: 4.0 g/dL / Pro: 7.0 g/dL / ALK PHOS: 89 U/L / ALT: 18 U/L / AST: 15 U/L / GGT: x                       RADIOLOGY AND ADDITIONAL TESTS:    CONSULTANT NOTES REVIEWED:    CARE DISCUSSED WITH THE FOLLOWING CONSULTANTS/PROVIDERS: Contact Information:  Lala Reddy II, MD, MPH  PGY-1, Internal Medicine  Pager: [phone] ([facility]) /// [phone] ([facility])    AFTAB BASS, MRN-[id number]    Patient is a 64y old  Female who presents with a chief complaint of change in mental status (28 Jan 2020 16:14)      OVERNIGHT EVENTS: Patient states that she takes gabapentin 400 TID and tramadol 300. She was ordered for gabapentin and tramadol 100 daily.     SUBJECTIVE: Patient sleeping, arousable, endorses no acute complaints. Patient refusing meds.    ROS negative as per patient.      OBJECTIVE:  Vital Signs Last 24 Hrs  T(C): 36.9 (29 Jan 2020 05:17), Max: 36.9 (29 Jan 2020 05:17)  T(F): 98.5 (29 Jan 2020 05:17), Max: 98.5 (29 Jan 2020 05:17)  HR: 88 (29 Jan 2020 05:40) (88 - 111)  BP: 132/74 (29 Jan 2020 05:17) (132/74 - 152/77)  BP(mean): --  RR: 18 (29 Jan 2020 05:17) (18 - 99)  SpO2: 99% (29 Jan 2020 05:40) (98% - 100%)  I&O's Summary    28 Jan 2020 07:01  -  29 Jan 2020 07:00  --------------------------------------------------------  IN: 300 mL / OUT: 0 mL / NET: 300 mL        MEDICATIONS  (STANDING):  budesonide  80 MICROgram(s)/formoterol 4.5 MICROgram(s) Inhaler 2 Puff(s) Inhalation two times a day  dextrose 5%. 1000 milliLiter(s) (50 mL/Hr) IV Continuous <Continuous>  dextrose 50% Injectable 12.5 Gram(s) IV Push once  dextrose 50% Injectable 25 Gram(s) IV Push once  dextrose 50% Injectable 25 Gram(s) IV Push once  enoxaparin Injectable 40 milliGRAM(s) SubCutaneous every 12 hours  gabapentin 400 milliGRAM(s) Oral three times a day  influenza   Vaccine 0.5 milliLiter(s) IntraMuscular once  insulin glargine Injectable (LANTUS) 30 Unit(s) SubCutaneous every morning  insulin lispro (HumaLOG) corrective regimen sliding scale   SubCutaneous at bedtime  insulin lispro (HumaLOG) corrective regimen sliding scale   SubCutaneous three times a day before meals  insulin lispro Injectable (HumaLOG) 8 Unit(s) SubCutaneous three times a day before meals  levothyroxine Injectable 100 MICROGram(s) IV Push at bedtime  metoprolol succinate ER 50 milliGRAM(s) Oral daily  pantoprazole    Tablet 40 milliGRAM(s) Oral before breakfast  potassium acid phosphate/sodium acid phosphate tablet (K-PHOS No. 2) 1 Tablet(s) Oral four times a day with meals  predniSONE   Tablet 15 milliGRAM(s) Oral daily  primidone 50 milliGRAM(s) Oral at bedtime  simvastatin 20 milliGRAM(s) Oral at bedtime    MEDICATIONS  (PRN):  acetaminophen   Tablet .. 650 milliGRAM(s) Oral every 6 hours PRN Temp greater or equal to 38C (100.4F), Mild Pain (1 - 3), Moderate Pain (4 - 6)  albuterol/ipratropium for Nebulization 3 milliLiter(s) Nebulizer every 6 hours PRN Shortness of Breath and/or Wheezing  dextrose 40% Gel 15 Gram(s) Oral once PRN Blood Glucose LESS THAN 70 milliGRAM(s)/deciLiter  glucagon  Injectable 1 milliGRAM(s) IntraMuscular once PRN Glucose <70 milliGRAM(s)/deciLiter  haloperidol    Injectable 2 milliGRAM(s) IntraMuscular every 4 hours PRN Agitation  haloperidol    Injectable 2 milliGRAM(s) IV Push every 4 hours PRN Agitation  melatonin 3 milliGRAM(s) Oral at bedtime PRN Insomnia  traMADol 100 milliGRAM(s) Oral daily PRN Severe Pain (7 - 10)    Allergies    No Known Allergies    Intolerances    Seroquel (Other)      CONSTITUTIONAL: Combative, no acute distress.  RESPIRATORY: CTAB in anterior fields.   CARDIOVASCULAR: Unable to assess due to patient refusal.  GASTROINTESTINAL: +ABD dressing due to evisceration of wound.  BACK: No spinal or paraspinal tenderness. No CVA tenderness.  EXTREMITY: No LE swelling or edema. EXTs warm to touch.  MUSCULOSKELETAL: Unable to assess due to patient refusal.  DERMATOLOGICAL: Chronic LE skin changes. UE ecchymoses.  NEUROLOGICAL: Global extremity deficits.  PSYCHIATRIC: Belligerent.                              9.3    11.15 )-----------( 327      ( 27 Jan 2020 15:09 )             30.0     PT/INR - ( 27 Jan 2020 15:09 )   PT: 10.7 sec;   INR: 0.94 ratio         PTT - ( 27 Jan 2020 15:09 )  PTT:25.6 sec  01-28    140  |  108  |  10  ----------------------------<  149<H>  4.1   |  19<L>  |  0.80    Ca    9.3      28 Jan 2020 15:30  Phos  2.1     01-28  Mg     2.2     01-28    TPro  7.0  /  Alb  4.0  /  TBili  0.1<L>  /  DBili  x   /  AST  15  /  ALT  18  /  AlkPhos  89  01-27    CAPILLARY BLOOD GLUCOSE      POCT Blood Glucose.: 176 mg/dL (28 Jan 2020 22:41)  POCT Blood Glucose.: 134 mg/dL (28 Jan 2020 18:43)  POCT Blood Glucose.: 155 mg/dL (28 Jan 2020 14:53)  POCT Blood Glucose.: 213 mg/dL (28 Jan 2020 09:21)    LIVER FUNCTIONS - ( 27 Jan 2020 15:09 )  Alb: 4.0 g/dL / Pro: 7.0 g/dL / ALK PHOS: 89 U/L / ALT: 18 U/L / AST: 15 U/L / GGT: x                       RADIOLOGY AND ADDITIONAL TESTS:    CONSULTANT NOTES REVIEWED:    CARE DISCUSSED WITH THE FOLLOWING CONSULTANTS/PROVIDERS:

## 2020-01-29 NOTE — PROGRESS NOTE ADULT - PROBLEM SELECTOR PLAN 5
-  with slight AG (18), BHB 4 (elevated) on presentation; had received insulin prior to presentation  - -300 (most recent value 155)  - Low suspicion for DKA as labs not suggestive of it; etiology of ketones unclear  - Lantus 30U, Humalog increased to 10U TID, moderate ISS -  with slight AG (18), BHB 4 (elevated) on presentation; had received insulin prior to presentation  - Monitor for DKA whenever labs obtained  - Lantus 30U, Humalog increased to 10U TID, moderate ISS

## 2020-01-29 NOTE — PROVIDER CONTACT NOTE (OTHER) - REASON
pt. is requesting tramadol, says that she takes 300mg at home and only got 100mg today, pt also refusing to take IV meds

## 2020-01-29 NOTE — DISCHARGE NOTE NURSING/CASE MANAGEMENT/SOCIAL WORK - PATIENT PORTAL LINK FT
You can access the FollowMyHealth Patient Portal offered by [facility] by registering at the following website: [email or web address]. By joining [employer] FollowMyHealth portal, you will also be able to view your health information using other applications (apps) compatible with our system.

## 2020-01-29 NOTE — DISCHARGE NOTE NURSING/CASE MANAGEMENT/SOCIAL WORK - NSDCPEWEB_GEN_ALL_CORE
[place] website --- [email or web address] Tobacco [email or web address] website --- [email or web address]

## 2020-01-29 NOTE — DISCHARGE NOTE NURSING/CASE MANAGEMENT/SOCIAL WORK - NSDCPEPTSTRK_GEN_ALL_CORE
Need for follow up after discharge/Call 911 for stroke/Prescribed medications/Stroke education booklet/Stroke support groups for patients, families, and friends/Risk factors for stroke/Stroke warning signs and symptoms/Signs and symptoms of stroke

## 2020-01-29 NOTE — PROGRESS NOTE ADULT - ATTENDING COMMENTS
Patient seen, unable to examine. Still w/ paranoia thoughts and refusing insulin at times. Encephalopathy likely 2/2 to endocrinopathy, will continue to give synthroid. While patient does not have capacity to leave hospital or refuse medications, to what extent and measures (haldol/restraints) can be used to administer medications? Will consult ethics to help assist.

## 2020-01-29 NOTE — PROGRESS NOTE ADULT - SUBJECTIVE AND OBJECTIVE BOX
Chief Complaint: T2DM     History: Patient is still under the impression that she does not need insulin despitre glucoses in the high 200s when she did not get it in the AM.     MEDICATIONS  (STANDING):  budesonide  80 MICROgram(s)/formoterol 4.5 MICROgram(s) Inhaler 2 Puff(s) Inhalation two times a day  dextrose 5%. 1000 milliLiter(s) (50 mL/Hr) IV Continuous <Continuous>  dextrose 50% Injectable 12.5 Gram(s) IV Push once  dextrose 50% Injectable 25 Gram(s) IV Push once  dextrose 50% Injectable 25 Gram(s) IV Push once  enoxaparin Injectable 40 milliGRAM(s) SubCutaneous every 12 hours  gabapentin 400 milliGRAM(s) Oral three times a day  influenza   Vaccine 0.5 milliLiter(s) IntraMuscular once  insulin glargine Injectable (LANTUS) 30 Unit(s) SubCutaneous every morning  insulin lispro (HumaLOG) corrective regimen sliding scale   SubCutaneous at bedtime  insulin lispro (HumaLOG) corrective regimen sliding scale   SubCutaneous three times a day before meals  insulin lispro Injectable (HumaLOG) 8 Unit(s) SubCutaneous three times a day before meals  levothyroxine Injectable 100 MICROGram(s) IV Push at bedtime  metoprolol succinate ER 50 milliGRAM(s) Oral daily  OLANZapine 2.5 milliGRAM(s) Oral two times a day  pantoprazole    Tablet 40 milliGRAM(s) Oral before breakfast  potassium acid phosphate/sodium acid phosphate tablet (K-PHOS No. 2) 1 Tablet(s) Oral four times a day with meals  predniSONE   Tablet 15 milliGRAM(s) Oral daily  primidone 50 milliGRAM(s) Oral at bedtime  simvastatin 20 milliGRAM(s) Oral at bedtime    MEDICATIONS  (PRN):  acetaminophen   Tablet .. 650 milliGRAM(s) Oral every 6 hours PRN Temp greater or equal to 38C (100.4F), Mild Pain (1 - 3), Moderate Pain (4 - 6)  albuterol/ipratropium for Nebulization 3 milliLiter(s) Nebulizer every 6 hours PRN Shortness of Breath and/or Wheezing  dextrose 40% Gel 15 Gram(s) Oral once PRN Blood Glucose LESS THAN 70 milliGRAM(s)/deciLiter  glucagon  Injectable 1 milliGRAM(s) IntraMuscular once PRN Glucose <70 milliGRAM(s)/deciLiter  haloperidol    Injectable 2 milliGRAM(s) IntraMuscular every 4 hours PRN Agitation  haloperidol    Injectable 2 milliGRAM(s) IV Push every 4 hours PRN Agitation  melatonin 3 milliGRAM(s) Oral at bedtime PRN Insomnia  traMADol 100 milliGRAM(s) Oral daily PRN Severe Pain (7 - 10)      Allergies    No Known Allergies    Intolerances    Seroquel (Other)      PHYSICAL EXAM:  VITALS: T(C): 36.7 (01-29-20 @ 13:54)  T(F): 98 (01-29-20 @ 13:54), Max: 98.5 (01-29-20 @ 05:17)  HR: 104 (01-29-20 @ 13:54) (88 - 111)  BP: 140/87 (01-29-20 @ 13:54) (132/74 - 149/87)  RR:  (18 - 18)  SpO2:  (94% - 100%)  Wt(kg): --  GENERAL: NAD, well-groomed, well-developed  EYES: No proptosis,  anicteric  HEENT:  Atraumatic, Normocephalic, moist mucous membranes  NEURO: AOx1, moves all extremities spontaenuously   PSYCH:  reactive affect, agitated mood      POCT Blood Glucose.: 289 mg/dL (01-29-20 @ 12:57)L30 H8+6  POCT Blood Glucose.: 126 mg/dL (01-29-20 @ 09:19)H0 refused   POCT Blood Glucose.: 176 mg/dL (01-28-20 @ 22:41)  POCT Blood Glucose.: 134 mg/dL (01-28-20 @ 18:43)H8  POCT Blood Glucose.: 155 mg/dL (01-28-20 @ 14:53)H8+2  POCT Blood Glucose.: 213 mg/dL (01-28-20 @ 09:21)H8+4  POCT Blood Glucose.: 300 mg/dL (01-28-20 @ 00:43)  POCT Blood Glucose.: 262 mg/dL (01-27-20 @ 23:06)  POCT Blood Glucose.: 229 mg/dL (01-27-20 @ 19:09)  POCT Blood Glucose.: 304 mg/dL (01-27-20 @ 13:39)  POCT Blood Glucose.: 461 mg/dL (01-27-20 @ 10:15)  POCT Blood Glucose.: 410 mg/dL (01-27-20 @ 10:13)  POCT Blood Glucose.: 344 mg/dL (01-27-20 @ 09:46)      01-28    140  |  108  |  10  ----------------------------<  149<H>  4.1   |  19<L>  |  0.80    EGFR if : 92  EGFR if non : 79    Ca    9.3      01-28  Mg     2.2     01-28  Phos  2.1     01-28    TPro  7.0  /  Alb  4.0  /  TBili  0.1<L>  /  DBili  x   /  AST  15  /  ALT  18  /  AlkPhos  89  01-27          Thyroid Function Tests:  01-28 @ 17:35 TSH -- FreeT4 0.7 T3 -- Anti TPO -- Anti Thyroglobulin Ab -- TSI --  01-27 @ 17:49 TSH 52.30 FreeT4 -- T3 29 Anti TPO -- Anti Thyroglobulin Ab -- TSI --      Hemoglobin A1C, Whole Blood: 9.0 % <H> [4.0 - 5.6] (01-28-20 @ 19:30)  Hemoglobin A1C, Whole Blood: 9.2 % <H> [4.0 - 5.6] (01-27-20 @ 17:36)

## 2020-01-29 NOTE — PROGRESS NOTE ADULT - PROBLEM SELECTOR PLAN 2
-  on admission; most recent TSH 52, TPO Ab elevated  - No overt clinical evidence of myxedema coma (hypoglycemia, hypothermia, or bradycardia), though patient refusing vital signs  - Per Endocrine, c/w synthroid 100 IV, f/u FT4 on 1/31

## 2020-01-29 NOTE — PROGRESS NOTE ADULT - PROBLEM SELECTOR PLAN 2
Hgb a1c of 9.2. Uncontrolled due to insulin nonadherence.      -continue lantus 30 units qAM   -increase humalog to 9 units tidac   -continue  sliding scale to moderate dose sliding scale tidac and qhs      Discharge  basal/bolus TBD    Dr. Huseyin Traore, endocrine fellow  Pager  from 9-5PM. After hours and on weekends please call [phone]

## 2020-01-30 LAB
ANA TITR SER: NEGATIVE — SIGNIFICANT CHANGE UP
CULTURE RESULTS: SIGNIFICANT CHANGE UP
CULTURE RESULTS: SIGNIFICANT CHANGE UP
GLUCOSE BLDC GLUCOMTR-MCNC: 181 MG/DL — HIGH (ref 70–99)
GLUCOSE BLDC GLUCOMTR-MCNC: 330 MG/DL — HIGH (ref 70–99)
GLUCOSE BLDC GLUCOMTR-MCNC: 341 MG/DL — HIGH (ref 70–99)
SPECIMEN SOURCE: SIGNIFICANT CHANGE UP
SPECIMEN SOURCE: SIGNIFICANT CHANGE UP

## 2020-01-30 PROCEDURE — 99233 SBSQ HOSP IP/OBS HIGH 50: CPT | Mod: GC

## 2020-01-30 PROCEDURE — 99232 SBSQ HOSP IP/OBS MODERATE 35: CPT | Mod: GC

## 2020-01-30 PROCEDURE — 99232 SBSQ HOSP IP/OBS MODERATE 35: CPT

## 2020-01-30 RX ORDER — LEVOTHYROXINE SODIUM 125 MCG
100 TABLET ORAL AT BEDTIME
Refills: 0 | Status: DISCONTINUED | OUTPATIENT
Start: 2020-01-30 | End: 2020-01-30

## 2020-01-30 RX ORDER — LEVOTHYROXINE SODIUM 125 MCG
200 TABLET ORAL
Refills: 0 | Status: DISCONTINUED | OUTPATIENT
Start: 2020-01-31 | End: 2020-02-03

## 2020-01-30 RX ORDER — LEVOTHYROXINE SODIUM 125 MCG
100 TABLET ORAL ONCE
Refills: 0 | Status: COMPLETED | OUTPATIENT
Start: 2020-01-30 | End: 2020-01-30

## 2020-01-30 RX ORDER — LEVOTHYROXINE SODIUM 125 MCG
200 TABLET ORAL ONCE
Refills: 0 | Status: DISCONTINUED | OUTPATIENT
Start: 2020-01-30 | End: 2020-01-30

## 2020-01-30 RX ADMIN — OLANZAPINE 2.5 MILLIGRAM(S): 15 TABLET, FILM COATED ORAL at 22:13

## 2020-01-30 RX ADMIN — PANTOPRAZOLE SODIUM 40 MILLIGRAM(S): 20 TABLET, DELAYED RELEASE ORAL at 09:32

## 2020-01-30 RX ADMIN — INSULIN GLARGINE 30 UNIT(S): 100 INJECTION, SOLUTION SUBCUTANEOUS at 10:20

## 2020-01-30 RX ADMIN — Medication 9 UNIT(S): at 13:12

## 2020-01-30 RX ADMIN — GABAPENTIN 400 MILLIGRAM(S): 400 CAPSULE ORAL at 22:10

## 2020-01-30 RX ADMIN — BUDESONIDE AND FORMOTEROL FUMARATE DIHYDRATE 2 PUFF(S): 160; 4.5 AEROSOL RESPIRATORY (INHALATION) at 17:30

## 2020-01-30 RX ADMIN — PRIMIDONE 50 MILLIGRAM(S): 250 TABLET ORAL at 22:10

## 2020-01-30 RX ADMIN — Medication 2: at 10:29

## 2020-01-30 RX ADMIN — TRAMADOL HYDROCHLORIDE 100 MILLIGRAM(S): 50 TABLET ORAL at 10:22

## 2020-01-30 RX ADMIN — Medication 8: at 17:19

## 2020-01-30 RX ADMIN — Medication 8: at 13:13

## 2020-01-30 RX ADMIN — Medication 50 MILLIGRAM(S): at 09:32

## 2020-01-30 RX ADMIN — OLANZAPINE 2.5 MILLIGRAM(S): 15 TABLET, FILM COATED ORAL at 11:43

## 2020-01-30 RX ADMIN — Medication 9 UNIT(S): at 17:18

## 2020-01-30 RX ADMIN — ENOXAPARIN SODIUM 40 MILLIGRAM(S): 100 INJECTION SUBCUTANEOUS at 22:13

## 2020-01-30 RX ADMIN — Medication 3 MILLIGRAM(S): at 22:10

## 2020-01-30 RX ADMIN — Medication 15 MILLIGRAM(S): at 09:32

## 2020-01-30 RX ADMIN — Medication 100 MICROGRAM(S): at 10:56

## 2020-01-30 RX ADMIN — ENOXAPARIN SODIUM 40 MILLIGRAM(S): 100 INJECTION SUBCUTANEOUS at 10:31

## 2020-01-30 RX ADMIN — Medication 9 UNIT(S): at 10:29

## 2020-01-30 RX ADMIN — HALOPERIDOL DECANOATE 2 MILLIGRAM(S): 100 INJECTION INTRAMUSCULAR at 10:11

## 2020-01-30 RX ADMIN — BUDESONIDE AND FORMOTEROL FUMARATE DIHYDRATE 2 PUFF(S): 160; 4.5 AEROSOL RESPIRATORY (INHALATION) at 05:22

## 2020-01-30 RX ADMIN — GABAPENTIN 400 MILLIGRAM(S): 400 CAPSULE ORAL at 13:14

## 2020-01-30 RX ADMIN — GABAPENTIN 400 MILLIGRAM(S): 400 CAPSULE ORAL at 06:12

## 2020-01-30 RX ADMIN — SIMVASTATIN 20 MILLIGRAM(S): 20 TABLET, FILM COATED ORAL at 22:10

## 2020-01-30 NOTE — PROGRESS NOTE ADULT - SUBJECTIVE AND OBJECTIVE BOX
INTERVAL HPI/OVERNIGHT EVENTS:    No acute events overnight.  Patient refusing to provide Hx.    MEDICATIONS  (STANDING):  budesonide  80 MICROgram(s)/formoterol 4.5 MICROgram(s) Inhaler 2 Puff(s) Inhalation two times a day  dextrose 5%. 1000 milliLiter(s) (50 mL/Hr) IV Continuous <Continuous>  dextrose 50% Injectable 12.5 Gram(s) IV Push once  dextrose 50% Injectable 25 Gram(s) IV Push once  dextrose 50% Injectable 25 Gram(s) IV Push once  enoxaparin Injectable 40 milliGRAM(s) SubCutaneous every 12 hours  gabapentin 400 milliGRAM(s) Oral three times a day  influenza   Vaccine 0.5 milliLiter(s) IntraMuscular once  insulin glargine Injectable (LANTUS) 30 Unit(s) SubCutaneous every morning  insulin lispro (HumaLOG) corrective regimen sliding scale   SubCutaneous at bedtime  insulin lispro (HumaLOG) corrective regimen sliding scale   SubCutaneous three times a day before meals  insulin lispro Injectable (HumaLOG) 9 Unit(s) SubCutaneous three times a day before meals  levothyroxine Injectable 100 MICROGram(s) IV Push once  melatonin 3 milliGRAM(s) Oral at bedtime  metoprolol succinate ER 50 milliGRAM(s) Oral daily  OLANZapine 2.5 milliGRAM(s) Oral two times a day  pantoprazole    Tablet 40 milliGRAM(s) Oral before breakfast  predniSONE   Tablet 15 milliGRAM(s) Oral daily  primidone 50 milliGRAM(s) Oral at bedtime  simvastatin 20 milliGRAM(s) Oral at bedtime    MEDICATIONS  (PRN):  acetaminophen   Tablet .. 650 milliGRAM(s) Oral every 6 hours PRN Temp greater or equal to 38C (100.4F), Mild Pain (1 - 3), Moderate Pain (4 - 6)  albuterol/ipratropium for Nebulization 3 milliLiter(s) Nebulizer every 6 hours PRN Shortness of Breath and/or Wheezing  dextrose 40% Gel 15 Gram(s) Oral once PRN Blood Glucose LESS THAN 70 milliGRAM(s)/deciLiter  glucagon  Injectable 1 milliGRAM(s) IntraMuscular once PRN Glucose <70 milliGRAM(s)/deciLiter  haloperidol    Injectable 2 milliGRAM(s) IntraMuscular every 4 hours PRN Agitation  haloperidol    Injectable 2 milliGRAM(s) IV Push every 4 hours PRN Agitation  melatonin 3 milliGRAM(s) Oral at bedtime PRN Insomnia  traMADol 100 milliGRAM(s) Oral daily PRN Severe Pain (7 - 10)      Allergies    No Known Allergies    Intolerances    Seroquel (Other)      REVIEW OF SYSTEMS    ROS: unable to assess, patient refusing	    Vital Signs Last 24 Hrs  T(C): 36.7 (29 Jan 2020 13:54), Max: 36.7 (29 Jan 2020 13:54)  T(F): 98 (29 Jan 2020 13:54), Max: 98 (29 Jan 2020 13:54)  HR: 85 (30 Jan 2020 05:23) (85 - 104)  BP: 140/87 (29 Jan 2020 13:54) (140/87 - 140/87)  BP(mean): --  RR: 18 (29 Jan 2020 13:54) (18 - 18)  SpO2: 97% (30 Jan 2020 05:23) (94% - 97%)    PHYSICAL EXAM:    -patient refusing      LABS:    01-28    140  |  108  |  10  ----------------------------<  149<H>  4.1   |  19<L>  |  0.80    Ca    9.3      28 Jan 2020 15:30  Phos  2.1     01-28  Mg     2.2     01-28              RADIOLOGY & ADDITIONAL TESTS:

## 2020-01-30 NOTE — PROGRESS NOTE ADULT - PROBLEM SELECTOR PLAN 2
Hgb a1c of 9.2. Uncontrolled due to insulin nonadherence.      -continue lantus 30 units qAM   - and continue humalog 9 units tidac   -no pattern to hyperglycemia other than she eats prior to fingersticks being taken or refuses her insulin  -continue  sliding scale to moderate dose sliding scale tidac and qhs      Discharge  basal/bolus TBD

## 2020-01-30 NOTE — PROGRESS NOTE ADULT - ATTENDING COMMENTS
Patient seen and examined. Still with paranoia and today stating that she was going to leave. She is not able to articulate why she is in the hospital, what she is being treated for, and the risks for leaving. Son at bedside during this conversation. Ethics also consulted to help advise and guide regarding the extent of pharmacological and physical measures that can be used to assist with medical management. Will also discuss with endocrine team regarding whether the encephalopathy is suspected to be from endocrinopathy and the typical time course for improvement.Will need to consider MRI and LP as well, f/u rheum recs.

## 2020-01-30 NOTE — PROGRESS NOTE ADULT - PROBLEM SELECTOR PLAN 1
- Per Endo, low suspicion of Hashimoto encephalitis as LP and autoimmune labs are needed to confirm diagnosis; continue to treat hypothyroidism with synthroid  - Per Rheum and Neuro, no further workup  - Per son, mental status change for 3-4w, characterized by increased aggressiveness and distrust of family and home health aids  - Ddx: Endocrinopathy (given abnormal thyroid dysfunction) vs psychiatric vs infectious (unlikely as patient stable, Bcx negative)  - Highly aggressive and physical  - Seen by Neuro, no acute intervention  - CT from previous admission benign, consider MRI if condition worsens  - Per Psych, DOES NOT HAVE CAPACITY TO LEAVE OR REFUSE MEDS; recommends Zyprexa 2.5mg BID + Haldol 2mg IV or IM Q4H for agitation - Per Endo, low suspicion of Hashimoto encephalitis as LP and autoimmune labs are needed to confirm diagnosis; continue to treat hypothyroidism with synthroid  - Per son, mental status change for 3-4w, characterized by increased aggressiveness and distrust of family and home health aids  - Ddx: Endocrinopathy (given abnormal thyroid dysfunction) vs psychiatric vs infectious (unlikely as patient stable, Bcx negative)  - Highly aggressive and physical  - Seen by Neuro, no acute intervention  - CT from previous admission benign, consider MRI if condition worsens  - Per Psych, DOES NOT HAVE CAPACITY TO LEAVE OR REFUSE MEDS; recommends Zyprexa 2.5mg BID + Haldol 2mg IV or IM Q4H for agitation - Rheum recs appreciated, patient symptomology possibly due to encephalopathy due to longstanding RA; requires confirmation via labs; will obtain dsDNA, ALESSIA, ZAFAR, ribosomal P, NMDAR antibody; MRI and LP if patient amenable  - Per Endo, unable to confirm Hashimoto encephalitis as LP and autoimmune labs are needed to confirm diagnosis; continue to treat hypothyroidism with synthroid  - Per son, mental status change for 3-4w, characterized by increased aggressiveness and distrust of family and home health aids  - Ddx: Endocrinopathy (given abnormal thyroid dysfunction) vs psychiatric vs infectious (unlikely as patient stable, Bcx negative)  - Highly aggressive and physical  - Seen by Neuro, no acute intervention  - CT from previous admission benign, consider MRI if condition worsens  - Per Psych, Zyprexa 2.5mg BID + Haldol 2mg IV or IM Q4H for agitation; EKG to assess for QTc  - Ethics consulted, will see patient tomorrow

## 2020-01-30 NOTE — PROGRESS NOTE ADULT - ASSESSMENT
64f hx arthritis, hypothyroidism, pulmonary HTN, DM on insulin, COPD/CHRIS on noctournal CPAP and 3L home), RA on chronic prednisone, chronic tremors, SBO s/p ex lap with lysis of adhesions (4 retention sutures) c/b eviscerated 2/2 a coughing episode s/p ex lap (6 retention sutures) c/b multifocal pneumonia, developed hypercapnic respiratory failure (requiring intubation 1/6/19, s/p bronch 1/9/19, and extubated on 1/24/19, course further c/b Enterococci bacteremia), p/w agitation 3 weeks of unclear etiology, found to be significantly hypothyroid (), extremely combative and paranoid of family and healthcare staff. 64f hx arthritis, hypothyroidism, pulmonary HTN, DM on insulin, COPD/CHIRS on noctournal CPAP and 3L home), RA on chronic prednisone, chronic tremors, SBO s/p ex lap with lysis of adhesions (4 retention sutures) c/b eviscerated 2/2 a coughing episode s/p ex lap (6 retention sutures) c/b multifocal pneumonia, developed hypercapnic respiratory failure (requiring intubation 1/6/19, s/p bronch 1/9/19, and extubated on 1/24/19, course further c/b Enterococci bacteremia), p/w agitation 3 weeks of unclear etiology, found to be significantly hypothyroid (), extremely combative and paranoid of family and healthcare staff.

## 2020-01-30 NOTE — PROGRESS NOTE ADULT - PROBLEM SELECTOR PLAN 2
-  on admission; most recent TSH 52, TPO Ab elevated  - No overt clinical evidence of myxedema coma (hypoglycemia, hypothermia, or bradycardia), though patient refusing vital signs  - Per Endocrine, c/w synthroid 100 IV, f/u FT4 on 1/31 -  on admission; most recent TSH 52, TPO Ab elevated  - No overt clinical evidence of myxedema coma (hypoglycemia, hypothermia, or bradycardia), though patient refusing vital signs  - Per Endocrine, synthroid to be converted to PO per patient preference, f/u FT4 on 1/31

## 2020-01-30 NOTE — PROGRESS NOTE ADULT - SUBJECTIVE AND OBJECTIVE BOX
Contact Information:  Lala Reddy II, MD, MPH  PGY-1, Internal Medicine  Pager: [phone] ([facility]) /// [phone] ([facility])    AFTAB BASS, MRN-[id number]    Patient is a 64y old  Female who presents with a chief complaint of change in mental status (29 Jan 2020 16:58)      OVERNIGHT EVENTS:    SUBJECTIVE:    CONSTITUTIONAL: No weakness. No fatigue. No fever.  HEAD: No head trauma.   EYES: No vision changes.  ENT: No hearing changes or tinnitus. No ear pain. No changes in smell. No nasal congestion or discharge. No sore throat. No voice hoarseness.   NECK: No neck pain or stiffness. No lumps.  RESPIRATORY: No cough. No SOB. No wheezing. No hemoptysis.   CARDIOVASCULAR: No chest pain. No palpitations.   GASTROINTESTINAL: No dysphagia. No ABD pain. No distension. No constipation. No diarrhea. No pain with defecation. No hematemesis. No hematochezia or melena.  BACK: No back pain.  GENITOURINARY: No dysuria. No frequency or urgency. No hesitancy. No incontinence. No urinary retention. No suprapubic pain. No hematuria.  EXTREMITY: No swelling.  MUSCULOSKELETAL: No joint pain or swelling. No fractures. No stiffness.    SKIN: No rashes. No itching. No skin, hair, or nail changes.  NEUROLOGICAL: No weakness or paralysis. No lightheadedness or dizziness. No HA. No numbness or tingling.   PSYCHIATRIC: No depression.       OBJECTIVE:  Vital Signs Last 24 Hrs  T(C): 36.7 (29 Jan 2020 13:54), Max: 36.7 (29 Jan 2020 13:54)  T(F): 98 (29 Jan 2020 13:54), Max: 98 (29 Jan 2020 13:54)  HR: 85 (30 Jan 2020 05:23) (85 - 104)  BP: 140/87 (29 Jan 2020 13:54) (140/87 - 140/87)  BP(mean): --  RR: 18 (29 Jan 2020 13:54) (18 - 18)  SpO2: 97% (30 Jan 2020 05:23) (94% - 97%)  I&O's Summary    29 Jan 2020 07:01  -  30 Jan 2020 07:00  --------------------------------------------------------  IN: 730 mL / OUT: 0 mL / NET: 730 mL        MEDICATIONS  (STANDING):  budesonide  80 MICROgram(s)/formoterol 4.5 MICROgram(s) Inhaler 2 Puff(s) Inhalation two times a day  dextrose 5%. 1000 milliLiter(s) (50 mL/Hr) IV Continuous <Continuous>  dextrose 50% Injectable 12.5 Gram(s) IV Push once  dextrose 50% Injectable 25 Gram(s) IV Push once  dextrose 50% Injectable 25 Gram(s) IV Push once  enoxaparin Injectable 40 milliGRAM(s) SubCutaneous every 12 hours  gabapentin 400 milliGRAM(s) Oral three times a day  influenza   Vaccine 0.5 milliLiter(s) IntraMuscular once  insulin glargine Injectable (LANTUS) 30 Unit(s) SubCutaneous every morning  insulin lispro (HumaLOG) corrective regimen sliding scale   SubCutaneous at bedtime  insulin lispro (HumaLOG) corrective regimen sliding scale   SubCutaneous three times a day before meals  insulin lispro Injectable (HumaLOG) 9 Unit(s) SubCutaneous three times a day before meals  levothyroxine Injectable 100 MICROGram(s) IV Push at bedtime  melatonin 3 milliGRAM(s) Oral at bedtime  metoprolol succinate ER 50 milliGRAM(s) Oral daily  OLANZapine 2.5 milliGRAM(s) Oral two times a day  pantoprazole    Tablet 40 milliGRAM(s) Oral before breakfast  predniSONE   Tablet 15 milliGRAM(s) Oral daily  primidone 50 milliGRAM(s) Oral at bedtime  simvastatin 20 milliGRAM(s) Oral at bedtime    MEDICATIONS  (PRN):  acetaminophen   Tablet .. 650 milliGRAM(s) Oral every 6 hours PRN Temp greater or equal to 38C (100.4F), Mild Pain (1 - 3), Moderate Pain (4 - 6)  albuterol/ipratropium for Nebulization 3 milliLiter(s) Nebulizer every 6 hours PRN Shortness of Breath and/or Wheezing  dextrose 40% Gel 15 Gram(s) Oral once PRN Blood Glucose LESS THAN 70 milliGRAM(s)/deciLiter  glucagon  Injectable 1 milliGRAM(s) IntraMuscular once PRN Glucose <70 milliGRAM(s)/deciLiter  haloperidol    Injectable 2 milliGRAM(s) IntraMuscular every 4 hours PRN Agitation  haloperidol    Injectable 2 milliGRAM(s) IV Push every 4 hours PRN Agitation  melatonin 3 milliGRAM(s) Oral at bedtime PRN Insomnia  traMADol 100 milliGRAM(s) Oral daily PRN Severe Pain (7 - 10)    Allergies    No Known Allergies    Intolerances    Seroquel (Other)      CONSTITUTIONAL: No acute distress. Awake and alert.  HEAD: No evidence of trauma. Structures WNL.  EYES: +PERRL. +EOMI. No scleral icterus. No conjunctival injection.  ENT: Moist oral mucosa. No erythema. No pharyngeal exudates.   NECK: Supple. Appropriate ROM. No stiffness. No masses or lymphadenopathy.  RESPIRATORY: CTAB. No wheezes, rales, or rhonchi. No accessory muscle use. No apparent respiratory distress.  CARDIOVASCULAR: +S1/S2. No audible S3/S4. Regular rate and rhythm. No murmurs, rubs, or gallops. 2+ radial pulses x b/l UE; 2+ DP pulses x b/l LE.   GASTROINTESTINAL: Soft, nontender, nondistended. +BS. No rebound or guarding.   BACK: No spinal or paraspinal tenderness. No CVA tenderness.  EXTREMITY: No LE swelling or edema. EXTs warm to touch.  MUSCULOSKELETAL: Movement evident in all limbs. No tenderness on palpation.  DERMATOLOGICAL: No abnormal rashes or lesions.  NEUROLOGICAL: CN 2-12 grossly intact. No focal deficits. Sensation intact x 4EXT. A&Ox3 (oriented to person, place, and time).  PSYCHIATRIC: Appropriate affect.          01-28    140  |  108  |  10  ----------------------------<  149<H>  4.1   |  19<L>  |  0.80    Ca    9.3      28 Jan 2020 15:30  Phos  2.1     01-28  Mg     2.2     01-28      CAPILLARY BLOOD GLUCOSE      POCT Blood Glucose.: 296 mg/dL (29 Jan 2020 17:56)  POCT Blood Glucose.: 289 mg/dL (29 Jan 2020 12:57)                  RADIOLOGY AND ADDITIONAL TESTS:    CONSULTANT NOTES REVIEWED:    CARE DISCUSSED WITH THE FOLLOWING CONSULTANTS/PROVIDERS: Contact Information:  Lala Reddy II, MD, MPH  PGY-1, Internal Medicine  Pager: [phone] ([facility]) /// [phone] ([facility])    AFTAB BASS, MRN-[id number]    Patient is a 64y old  Female who presents with a chief complaint of change in mental status (29 Jan 2020 16:58)      OVERNIGHT EVENTS: Overnight, patient states that she does not want IV/IM meds and is amenable to PO meds.     SUBJECTIVE: Patient evaluated at bedside, no acute complaints; refuses to talk.    ROS negative.       OBJECTIVE:  Vital Signs Last 24 Hrs  T(C): 36.7 (29 Jan 2020 13:54), Max: 36.7 (29 Jan 2020 13:54)  T(F): 98 (29 Jan 2020 13:54), Max: 98 (29 Jan 2020 13:54)  HR: 85 (30 Jan 2020 05:23) (85 - 104)  BP: 140/87 (29 Jan 2020 13:54) (140/87 - 140/87)  BP(mean): --  RR: 18 (29 Jan 2020 13:54) (18 - 18)  SpO2: 97% (30 Jan 2020 05:23) (94% - 97%)  I&O's Summary    29 Jan 2020 07:01  -  30 Jan 2020 07:00  --------------------------------------------------------  IN: 730 mL / OUT: 0 mL / NET: 730 mL        MEDICATIONS  (STANDING):  budesonide  80 MICROgram(s)/formoterol 4.5 MICROgram(s) Inhaler 2 Puff(s) Inhalation two times a day  dextrose 5%. 1000 milliLiter(s) (50 mL/Hr) IV Continuous <Continuous>  dextrose 50% Injectable 12.5 Gram(s) IV Push once  dextrose 50% Injectable 25 Gram(s) IV Push once  dextrose 50% Injectable 25 Gram(s) IV Push once  enoxaparin Injectable 40 milliGRAM(s) SubCutaneous every 12 hours  gabapentin 400 milliGRAM(s) Oral three times a day  influenza   Vaccine 0.5 milliLiter(s) IntraMuscular once  insulin glargine Injectable (LANTUS) 30 Unit(s) SubCutaneous every morning  insulin lispro (HumaLOG) corrective regimen sliding scale   SubCutaneous at bedtime  insulin lispro (HumaLOG) corrective regimen sliding scale   SubCutaneous three times a day before meals  insulin lispro Injectable (HumaLOG) 9 Unit(s) SubCutaneous three times a day before meals  levothyroxine Injectable 100 MICROGram(s) IV Push at bedtime  melatonin 3 milliGRAM(s) Oral at bedtime  metoprolol succinate ER 50 milliGRAM(s) Oral daily  OLANZapine 2.5 milliGRAM(s) Oral two times a day  pantoprazole    Tablet 40 milliGRAM(s) Oral before breakfast  predniSONE   Tablet 15 milliGRAM(s) Oral daily  primidone 50 milliGRAM(s) Oral at bedtime  simvastatin 20 milliGRAM(s) Oral at bedtime    MEDICATIONS  (PRN):  acetaminophen   Tablet .. 650 milliGRAM(s) Oral every 6 hours PRN Temp greater or equal to 38C (100.4F), Mild Pain (1 - 3), Moderate Pain (4 - 6)  albuterol/ipratropium for Nebulization 3 milliLiter(s) Nebulizer every 6 hours PRN Shortness of Breath and/or Wheezing  dextrose 40% Gel 15 Gram(s) Oral once PRN Blood Glucose LESS THAN 70 milliGRAM(s)/deciLiter  glucagon  Injectable 1 milliGRAM(s) IntraMuscular once PRN Glucose <70 milliGRAM(s)/deciLiter  haloperidol    Injectable 2 milliGRAM(s) IntraMuscular every 4 hours PRN Agitation  haloperidol    Injectable 2 milliGRAM(s) IV Push every 4 hours PRN Agitation  melatonin 3 milliGRAM(s) Oral at bedtime PRN Insomnia  traMADol 100 milliGRAM(s) Oral daily PRN Severe Pain (7 - 10)    Allergies    No Known Allergies    Intolerances    Seroquel (Other)      CONSTITUTIONAL: Sleeping but arousable, no acute distress.  RESPIRATORY: Unable to assess due to patient refusal.  CARDIOVASCULAR: Unable to assess due to patient refusal.  GASTROINTESTINAL: +ABD dressing due to evisceration of wound.  BACK: No spinal or paraspinal tenderness. No CVA tenderness.  EXTREMITY: No LE swelling or edema. EXTs warm to touch.  MUSCULOSKELETAL: Unable to assess due to patient refusal.  DERMATOLOGICAL: Chronic LE skin changes. UE ecchymoses. IV in LLE.  NEUROLOGICAL: Global extremity deficits.  PSYCHIATRIC: Somnolent.          01-28    140  |  108  |  10  ----------------------------<  149<H>  4.1   |  19<L>  |  0.80    Ca    9.3      28 Jan 2020 15:30  Phos  2.1     01-28  Mg     2.2     01-28      CAPILLARY BLOOD GLUCOSE      POCT Blood Glucose.: 296 mg/dL (29 Jan 2020 17:56)  POCT Blood Glucose.: 289 mg/dL (29 Jan 2020 12:57)                  RADIOLOGY AND ADDITIONAL TESTS:    CONSULTANT NOTES REVIEWED:    CARE DISCUSSED WITH THE FOLLOWING CONSULTANTS/PROVIDERS:

## 2020-01-30 NOTE — PROGRESS NOTE ADULT - SUBJECTIVE AND OBJECTIVE BOX
Chief Complaint: Evaluating this 65 y/o F for uncontrolled Type 2 DM w/ hyperglycemia and hypothyroidism      Interval History: Hyperglycemia due to refusing insulin and eating cookies prior to fingersticks. Mental status not improving much despite improvement in thyroid function testing. +po intake.     MEDICATIONS  (STANDING):  budesonide  80 MICROgram(s)/formoterol 4.5 MICROgram(s) Inhaler 2 Puff(s) Inhalation two times a day  dextrose 5%. 1000 milliLiter(s) (50 mL/Hr) IV Continuous <Continuous>  dextrose 50% Injectable 12.5 Gram(s) IV Push once  dextrose 50% Injectable 25 Gram(s) IV Push once  dextrose 50% Injectable 25 Gram(s) IV Push once  enoxaparin Injectable 40 milliGRAM(s) SubCutaneous every 12 hours  gabapentin 400 milliGRAM(s) Oral three times a day  influenza   Vaccine 0.5 milliLiter(s) IntraMuscular once  insulin glargine Injectable (LANTUS) 30 Unit(s) SubCutaneous every morning  insulin lispro (HumaLOG) corrective regimen sliding scale   SubCutaneous at bedtime  insulin lispro (HumaLOG) corrective regimen sliding scale   SubCutaneous three times a day before meals  insulin lispro Injectable (HumaLOG) 9 Unit(s) SubCutaneous three times a day before meals  melatonin 3 milliGRAM(s) Oral at bedtime  metoprolol succinate ER 50 milliGRAM(s) Oral daily  OLANZapine 2.5 milliGRAM(s) Oral two times a day  pantoprazole    Tablet 40 milliGRAM(s) Oral before breakfast  predniSONE   Tablet 15 milliGRAM(s) Oral daily  primidone 50 milliGRAM(s) Oral at bedtime  simvastatin 20 milliGRAM(s) Oral at bedtime    MEDICATIONS  (PRN):  acetaminophen   Tablet .. 650 milliGRAM(s) Oral every 6 hours PRN Temp greater or equal to 38C (100.4F), Mild Pain (1 - 3), Moderate Pain (4 - 6)  albuterol/ipratropium for Nebulization 3 milliLiter(s) Nebulizer every 6 hours PRN Shortness of Breath and/or Wheezing  dextrose 40% Gel 15 Gram(s) Oral once PRN Blood Glucose LESS THAN 70 milliGRAM(s)/deciLiter  glucagon  Injectable 1 milliGRAM(s) IntraMuscular once PRN Glucose <70 milliGRAM(s)/deciLiter  haloperidol    Injectable 2 milliGRAM(s) IntraMuscular every 4 hours PRN Agitation  haloperidol    Injectable 2 milliGRAM(s) IV Push every 4 hours PRN Agitation  melatonin 3 milliGRAM(s) Oral at bedtime PRN Insomnia  traMADol 100 milliGRAM(s) Oral daily PRN Severe Pain (7 - 10)      Allergies    No Known Allergies    Intolerances    Seroquel (Other)    Review of Systems:  Constitutional: No fever  Eyes: No blurry vision  Cardiovascular: No chest pain  Respiratory: No SOB  GI: No abdominal pain, No nausea, No vomiting  Endocrine: as noted in HPI    All other negative      PHYSICAL EXAM:  VITALS: T(C): --  T(F): --  HR: 85 (01-30-20 @ 05:23) (85 - 88)  BP: --  RR: --  SpO2:  (96% - 97%)  Wt(kg): --  GENERAL: NAD at this time  EYES: EOMI, No proptosis  HEENT:  Atraumatic, Normocephalic,   RESPIRATORY: full excursion, non labored  CARDIOVASCULAR: Regular rhythm; normal S1/S2,  GI: Soft, nontender, non distended, normal bowel sounds  SKIN: Warm and dry  PSYCH: normal affect, normal mood      POCT Blood Glucose.: 341 mg/dL (01-30-20 @ 13:10)  POCT Blood Glucose.: 181 mg/dL (01-30-20 @ 10:18)  POCT Blood Glucose.: 296 mg/dL (01-29-20 @ 17:56)  POCT Blood Glucose.: 289 mg/dL (01-29-20 @ 12:57)  POCT Blood Glucose.: 126 mg/dL (01-29-20 @ 09:19)  POCT Blood Glucose.: 176 mg/dL (01-28-20 @ 22:41)  POCT Blood Glucose.: 134 mg/dL (01-28-20 @ 18:43)  POCT Blood Glucose.: 155 mg/dL (01-28-20 @ 14:53)  POCT Blood Glucose.: 213 mg/dL (01-28-20 @ 09:21)  POCT Blood Glucose.: 300 mg/dL (01-28-20 @ 00:43)  POCT Blood Glucose.: 262 mg/dL (01-27-20 @ 23:06)  POCT Blood Glucose.: 229 mg/dL (01-27-20 @ 19:09)        01-28    140  |  108  |  10  ----------------------------<  149<H>  4.1   |  19<L>  |  0.80    EGFR if : 92  EGFR if non : 79    Ca    9.3      01-28  Mg     2.2     01-28  Phos  2.1     01-28          Thyroid Function Tests:  01-28 @ 17:35 TSH -- FreeT4 0.7 T3 -- Anti TPO -- Anti Thyroglobulin Ab -- TSI --  01-27 @ 17:49 TSH 52.30 FreeT4 -- T3 29 Anti TPO -- Anti Thyroglobulin Ab -- TSI --      Hemoglobin A1C, Whole Blood: 9.0 % <H> [4.0 - 5.6] (01-28-20 @ 19:30)  Hemoglobin A1C, Whole Blood: 9.2 % <H> [4.0 - 5.6] (01-27-20 @ 17:36)

## 2020-01-30 NOTE — PROGRESS NOTE ADULT - PROBLEM SELECTOR PLAN 3
- Bcx + for GPR on 1/12 per patient records   - On presentation, tachycardia, otherwise not meeting SIRS criteria  - +Bcx from 1/12 sent to [facility] laboratories, currently no results  - Will monitor off Abx - F/u Bcx negative to date  - Bcx + for GPR on 1/12 per patient records   - On presentation, tachycardia, otherwise not meeting SIRS criteria  - +Bcx from 1/12 sent to [facility] laboratories, currently no results  - Will monitor off Abx

## 2020-01-30 NOTE — PROGRESS NOTE ADULT - ASSESSMENT
63 yo F PMH RA (seropositive, erosive on chronic prednisone), hypothyroidism, DM2, COPD presented with acute encephalopathy in setting of elevated TSH.  We are consulted to r/o RA flare.    Plan:    #acute metabolic encephalopathy  -c/w IV synthroid per endo  -c/w home prednisone and naproxen doses  -send serologies - ALESSIA, dsDNA, ZAFAR, Ribosomal P and NMDAR antibody  -Rare cases of acute encephalopathy have been reported with  long standing RA but will need more evidence to consider this diagnosis - suggest MRI brain to look for leptomeningeal or white matter disease or presence of rheumatoid lesions/nodules in the brain. Also will suggest MRA/MRV if MRI negative. Will likely require LP for further confirmation.   The other possibility is that of PACNS or autoimmune encephalitis which can also be confirmed with studies suggested above.    -attempt to obtain records from OP rheumatologist

## 2020-01-30 NOTE — PROGRESS NOTE ADULT - PROBLEM SELECTOR PLAN 1
, FT4 0.3. Stable vitals on admission, not in myxedema coma.   - continue LT4 100 mcg IV   - repeat FT4 on 1/31

## 2020-01-30 NOTE — PROGRESS NOTE ADULT - PROBLEM SELECTOR PLAN 5
-  with slight AG (18), BHB 4 (elevated) on presentation; had received insulin prior to presentation  - Monitor for DKA whenever labs obtained  - Lantus 30U, Humalog increased to 10U TID, moderate ISS -  with slight AG (18), BHB 4 (elevated) on presentation; had received insulin prior to presentation  - Per endocrine, lantus 30 + Humalog 9 TID with meals  - Monitor for DKA whenever labs obtained  - Lantus 30U, Humalog increased to 10U TID, moderate ISS

## 2020-01-30 NOTE — PROGRESS NOTE ADULT - PROBLEM SELECTOR PLAN 7
- DVT ppx: Lovenox  - Diet: DASH diet w/evening snack - DVT ppx: Lovenox  - Diet: DASH diet w/evening snack  - Dispo: Pending

## 2020-01-31 DIAGNOSIS — Z73.89 OTHER PROBLEMS RELATED TO LIFE MANAGEMENT DIFFICULTY: ICD-10-CM

## 2020-01-31 LAB
GLUCOSE BLDC GLUCOMTR-MCNC: 129 MG/DL — HIGH (ref 70–99)
GLUCOSE BLDC GLUCOMTR-MCNC: 326 MG/DL — HIGH (ref 70–99)

## 2020-01-31 PROCEDURE — 99233 SBSQ HOSP IP/OBS HIGH 50: CPT | Mod: GC

## 2020-01-31 PROCEDURE — 99232 SBSQ HOSP IP/OBS MODERATE 35: CPT

## 2020-01-31 RX ORDER — HALOPERIDOL DECANOATE 100 MG/ML
2 INJECTION INTRAMUSCULAR ONCE
Refills: 0 | Status: COMPLETED | OUTPATIENT
Start: 2020-01-31 | End: 2020-01-31

## 2020-01-31 RX ORDER — TRAMADOL HYDROCHLORIDE 50 MG/1
100 TABLET ORAL THREE TIMES A DAY
Refills: 0 | Status: DISCONTINUED | OUTPATIENT
Start: 2020-01-31 | End: 2020-02-07

## 2020-01-31 RX ORDER — TRAMADOL HYDROCHLORIDE 50 MG/1
50 TABLET ORAL ONCE
Refills: 0 | Status: DISCONTINUED | OUTPATIENT
Start: 2020-01-31 | End: 2020-01-31

## 2020-01-31 RX ORDER — LANOLIN ALCOHOL/MO/W.PET/CERES
5 CREAM (GRAM) TOPICAL ONCE
Refills: 0 | Status: COMPLETED | OUTPATIENT
Start: 2020-01-31 | End: 2020-01-31

## 2020-01-31 RX ORDER — GABAPENTIN 400 MG/1
100 CAPSULE ORAL ONCE
Refills: 0 | Status: COMPLETED | OUTPATIENT
Start: 2020-01-31 | End: 2020-01-31

## 2020-01-31 RX ADMIN — SIMVASTATIN 20 MILLIGRAM(S): 20 TABLET, FILM COATED ORAL at 21:48

## 2020-01-31 RX ADMIN — TRAMADOL HYDROCHLORIDE 100 MILLIGRAM(S): 50 TABLET ORAL at 12:58

## 2020-01-31 RX ADMIN — BUDESONIDE AND FORMOTEROL FUMARATE DIHYDRATE 2 PUFF(S): 160; 4.5 AEROSOL RESPIRATORY (INHALATION) at 14:45

## 2020-01-31 RX ADMIN — HALOPERIDOL DECANOATE 2 MILLIGRAM(S): 100 INJECTION INTRAMUSCULAR at 00:58

## 2020-01-31 RX ADMIN — Medication 250 MILLIGRAM(S): at 02:00

## 2020-01-31 RX ADMIN — OLANZAPINE 2.5 MILLIGRAM(S): 15 TABLET, FILM COATED ORAL at 08:35

## 2020-01-31 RX ADMIN — Medication 250 MILLIGRAM(S): at 22:52

## 2020-01-31 RX ADMIN — GABAPENTIN 400 MILLIGRAM(S): 400 CAPSULE ORAL at 06:49

## 2020-01-31 RX ADMIN — PRIMIDONE 50 MILLIGRAM(S): 250 TABLET ORAL at 21:48

## 2020-01-31 RX ADMIN — TRAMADOL HYDROCHLORIDE 100 MILLIGRAM(S): 50 TABLET ORAL at 19:36

## 2020-01-31 RX ADMIN — Medication 9 UNIT(S): at 08:41

## 2020-01-31 RX ADMIN — Medication 250 MILLIGRAM(S): at 00:58

## 2020-01-31 RX ADMIN — TRAMADOL HYDROCHLORIDE 100 MILLIGRAM(S): 50 TABLET ORAL at 13:28

## 2020-01-31 RX ADMIN — Medication 15 MILLIGRAM(S): at 08:34

## 2020-01-31 RX ADMIN — GABAPENTIN 100 MILLIGRAM(S): 400 CAPSULE ORAL at 01:20

## 2020-01-31 RX ADMIN — INSULIN GLARGINE 30 UNIT(S): 100 INJECTION, SOLUTION SUBCUTANEOUS at 08:39

## 2020-01-31 RX ADMIN — Medication 200 MICROGRAM(S): at 06:49

## 2020-01-31 RX ADMIN — PANTOPRAZOLE SODIUM 40 MILLIGRAM(S): 20 TABLET, DELAYED RELEASE ORAL at 08:35

## 2020-01-31 RX ADMIN — GABAPENTIN 400 MILLIGRAM(S): 400 CAPSULE ORAL at 19:36

## 2020-01-31 RX ADMIN — OLANZAPINE 2.5 MILLIGRAM(S): 15 TABLET, FILM COATED ORAL at 18:34

## 2020-01-31 RX ADMIN — Medication 250 MILLIGRAM(S): at 23:22

## 2020-01-31 RX ADMIN — Medication 3 MILLIGRAM(S): at 23:59

## 2020-01-31 RX ADMIN — Medication 5 MILLIGRAM(S): at 00:58

## 2020-01-31 RX ADMIN — Medication 50 MILLIGRAM(S): at 08:35

## 2020-01-31 NOTE — DIETITIAN INITIAL EVALUATION ADULT. - PROBLEM SELECTOR PLAN 3
Patient does not meet qSOFA scoring nor SIRS, will observe of abx at this time.   Seen on x1 bottle from [place],   -repeat blood cultures sent  Spoke with Caitlin from [employer] [facility] from [facility]. Bcx from 1/13 sent to [facility] laboratories. Currently no results. Please call [phone] to follow up.  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

## 2020-01-31 NOTE — PROGRESS NOTE ADULT - PROBLEM SELECTOR PLAN 5
-  with slight AG (18), BHB 4 (elevated) on presentation; had received insulin prior to presentation  - Per endocrine, lantus 30 + Humalog 9 TID with meals  - Monitor for DKA whenever labs obtained  - Lantus 30U, Humalog increased to 10U TID, moderate ISS - Moderate pulmonary HTN per TTE (1/13)  - Per son, sildenafil was discontinued   - C/w Toprol 50

## 2020-01-31 NOTE — PROGRESS NOTE ADULT - PROBLEM SELECTOR PLAN 3
- F/u Bcx negative to date  - Bcx + for GPR on 1/12 per patient records   - On presentation, tachycardia, otherwise not meeting SIRS criteria  - +Bcx from 1/12 sent to [facility] laboratories, currently no results  - Will monitor off Abx -  on admission; most recent TSH 52, TPO Ab elevated  - No overt clinical evidence of myxedema coma (hypoglycemia, hypothermia, or bradycardia), though patient refusing vital signs  - C/w synthroid PO  - Pending repeat thyroid labs

## 2020-01-31 NOTE — DIETITIAN INITIAL EVALUATION ADULT. - PROBLEM SELECTOR PLAN 4
c/w Sildenafil 20mg TiD, c/w metoprolol, Echo showing mild pulmonary HTN  -need to follow up with pulmonologist on Monday to clarify as unclear why on pulm HTN treatment with mild pulm HTN  -

## 2020-01-31 NOTE — PROGRESS NOTE ADULT - PROBLEM SELECTOR PLAN 7
- DVT ppx: Lovenox  - Diet: DASH diet w/evening snack  - Dispo: Pending - C/w prednisone 15 mg  - Tramadol PRN  - Rheum - no rheumatological involvement

## 2020-01-31 NOTE — PROGRESS NOTE ADULT - PROBLEM SELECTOR PLAN 1
- Rheum recs appreciated, patient symptomology possibly due to encephalopathy due to longstanding RA; requires confirmation via labs; will obtain dsDNA, ALESSIA, ZAFAR, ribosomal P, NMDAR antibody; MRI and LP if patient amenable  - Per Endo, unable to confirm Hashimoto encephalitis as LP and autoimmune labs are needed to confirm diagnosis; continue to treat hypothyroidism with synthroid  - Per son, mental status change for 3-4w, characterized by increased aggressiveness and distrust of family and home health aids  - Ddx: Endocrinopathy (given abnormal thyroid dysfunction) vs psychiatric vs infectious (unlikely as patient stable, Bcx negative)  - Highly aggressive and physical  - Seen by Neuro, no acute intervention  - CT from previous admission benign, consider MRI if condition worsens  - Per Psych, Zyprexa 2.5mg BID + Haldol 2mg IV or IM Q4H for agitation; EKG to assess for QTc  - Ethics consulted, will see patient tomorrow - Ethics consulted because of patient refusal to take medications important for maintenance of health; Psych has already declared that patient lacks capacity to refuse medications or life-preserving interventions and ana maría Pisano (HCP) is in agreeance. Per Ethics, given that treatment is in the best interest of patient, it is permissible to use chemical and physical restraint to provide such treatment - Ethics consulted because of patient refusal to take medications important for maintenance of health, to elucidate appropriateness of medication administration in this context  - Psych has already declared that patient lacks capacity to refuse medications or life-preserving interventions and ana maría Pisano (HCP) is in agreeance.   - Per Ethics, given that treatment is in the best interest of patient, it is permissible to use chemical and physical restraint to provide such treatment (see Ethics note)

## 2020-01-31 NOTE — PROGRESS NOTE BEHAVIORAL HEALTH - NSBHCONSULTRECOMMENDOTHER_PSY_A_CORE FT
1)  zyprexa 2.5 mg po BID for mood stability/paranoia and agitation; f/u QTc < 500  2) for acute agitation, continue haldol 2 mg IV Q4hr PRN, f/u QTc < 500  3) pt's confusion likely secondary to elevated TSH, f/u endocrinology recommendations  4) pt lacks capacity to refuse treatments, defer to pt's HCP   5) pt cannot leave AMA  6) no current indication for inpt psychiatric admission

## 2020-01-31 NOTE — DIETITIAN INITIAL EVALUATION ADULT. - PERTINENT LABORATORY DATA
1/28 HgA1c: 9.0%  CAPILLARY BLOOD GLUCOSE  POCT Blood Glucose.: 129 mg/dL (31 Jan 2020 08:28)  POCT Blood Glucose.: 330 mg/dL (30 Jan 2020 17:14)  POCT Blood Glucose.: 341 mg/dL (30 Jan 2020 13:10)

## 2020-01-31 NOTE — PROGRESS NOTE ADULT - PROBLEM SELECTOR PLAN 4
- Moderate pulmonary HTN per TTE (1/13)  - Per son, sildenafil was discontinued   - C/w Toprol 50 - F/u Bcx negative to date  - Bcx + for GPR on 1/12 per patient records   - On presentation, tachycardia, otherwise not meeting SIRS criteria  - +Bcx from 1/12 sent to [facility] laboratories, currently no results  - Will monitor off Abx

## 2020-01-31 NOTE — CHART NOTE - NSCHARTNOTEFT_GEN_A_CORE
Discussed case in detail with Dr. Reddy and Attending, Dr. Ingram. Patient lacks insight and capacity related to her current condition. Attempted to meet with patient, who told me to "get out".     Ethics consult initiated to assist with shared decision making regarding plan of care for hyperglycemia and encephalopathy given that patient is actively refusing blood draws, imaging and almost medical interventions. A patient’s autonomy is best preserved through informed consent by the autonomous patient with capacity. Mrs. Morrow also has MORAL STATUS – that is, her distinct personhood, personal experience and interpretation of suffering, life-story, etc. – which must be respected. Therefore, the ethical analysis must consider whether it is ethically reasonable to coerce or utilize restraint in a patient empathetically articulating the refusal of medical intervention.     Coercive treatment is, thus, acceptable only on the condition that      (1) The patient cannot make an autonomous decision about treatment and     (2) Treatment is in the patient’s authentic interest.      Coercive treatment cannot be defended when the patient makes an autonomous decision against treatment or when treatment is not in the patient’s genuine interest. In this case, proportionality suggests that without intervention Ms. Morrow could experience life threatening complications. [facility] restraint policy, Policy # PCS.1614 Non-Violent / Non Self- Destructive: Level One restraint standards are implemented for medical or surgical purposes and apply when the primary reason for use directly supports medical healing and to: 1) Allow medical treatments to continue without interruption, 2) Prevent pulling out necessary tubes or drains, and 3) Provide safety when the patient is unable to follow directions. Indications for this policy include: medical management, disorganization, agitation as well as aggression toward self and others.     Presently, this patient requires IV access for medications and blood draws. It is ethically permissible to utilize chemical and physical restraints to get IV access.     Other medical treatments such as MRI and LP to be addressed further.     Full consult to follow.     Ethics [phone]

## 2020-01-31 NOTE — PROGRESS NOTE ADULT - PROBLEM SELECTOR PLAN 8
Transitions of Care Status:  1.  Name of PCP:  Kristie Nails MD (PCP)  2.  PCP Contacted on Admission: [ ] Y    [x] N    3.  PCP contacted at Discharge: [ ] Y    [ ] N    [ ] N/A  4.  Post-Discharge Appointment Date and Location:  5.  Summary of Handoff given to PCP: - DVT ppx: Lovenox  - Diet: DASH diet w/evening snack  - Dispo: Pending - DVT ppx: Lovenox (patient refuses at times)  - Diet: DASH diet w/evening snack  - Dispo: Pending

## 2020-01-31 NOTE — DIETITIAN INITIAL EVALUATION ADULT. - PROBLEM SELECTOR PLAN 5
Slight AG on arrival w/ elevated BHB however received 20U Lantus this AM, unlikely DKA as AG low, w/o acidosis or highly elevated sugars.  -not on SGLT-2 inhibitor, should not have concern for non-ketotic DKA   -unclear why beta hydroxy is high as patient eating, will montior, continue insulin and diet.   -c/w lantus 20U (received in AM) will hold off on given in PM, will start sliding scale for pre-meals, can consider NPH In AM,    -Endocrine will see patient in AM.

## 2020-01-31 NOTE — DIETITIAN INITIAL EVALUATION ADULT. - ADD RECOMMEND
1. Continue with Consistent Carbohydrate diet as tolerated.  2. Will monitor for potential acceptance of nutrition education given any changes in mental status.  3. Will continue to monitor PO intake, labs, weights, BM, skin, clinical course.

## 2020-01-31 NOTE — DIETITIAN INITIAL EVALUATION ADULT. - OTHER INFO
Per chart, pt is a 64F w PMHx of arthritis, hypothyroidism, pulmonary HTN, DM on insulin, COPD/CHRIS, RA on chronic prednisone, chronic tremors, presenting with agitation x 3 weeks of unclear etiology, found to be significantly hypothyroid.    Pt with altered mental status, not appropriate to interview. Per chart and RN, pt with no GI complaints at this time, last BM 1/30.  NKFA per chart.  Pt with no chewing or swallowing issues at this time. Per RN, pt consuming >75% of meals. Per previous RD notes, pt with weight of 205.6 pounds ~ 10 months ago (3/21/19) - noted most recent weight of 205.6 pounds via bed scale (1/29/20).  Per RD notes, pt with HgA1c of 6.9% on 3/22/19, most recent HgA1c of 9.0% suggesting poor glycemic control since prior admission.    Unable to give nutrition education at this time due to pt's mental status.

## 2020-01-31 NOTE — DIETITIAN INITIAL EVALUATION ADULT. - PROBLEM SELECTOR PLAN 2
patient able to make coherent thought processes, but underlying paranoia.  -unclear trust issues with son is legitimate or paranoia  -given firing of multiple aides, throwing people out of hous who bring food, do think psychiatric component.  -CT head from 2 weeks ago benign.  -if unimproved, may need MR.  -do not think meningitis, no LP at this time.

## 2020-01-31 NOTE — DIETITIAN INITIAL EVALUATION ADULT. - PERTINENT MEDS FT
Insulin sliding scale, Lantus 30 units before breakfast, Humalog 9 units before meals, Synthroid, Deltasone

## 2020-01-31 NOTE — PROGRESS NOTE ADULT - PROBLEM SELECTOR PLAN 2
-  on admission; most recent TSH 52, TPO Ab elevated  - No overt clinical evidence of myxedema coma (hypoglycemia, hypothermia, or bradycardia), though patient refusing vital signs  - Per Endocrine, synthroid to be converted to PO per patient preference, f/u FT4 on 1/31 - Labs and imaging pending  - No overt change in patient mental status  - Per son, mental status change for 3-4w, characterized by increased aggressiveness and distrust of family and home health aids  - Ddx: Endocrinopathy (given abnormal thyroid dysfunction) vs psychiatric vs infectious (unlikely as patient stable, Bcx negative)  - Highly aggressive and physical  - Seen by Neuro, no acute intervention  - CT from previous admission benign, consider MRI if condition worsens  - Per Psych, Zyprexa 2.5mg BID + Haldol 2mg IV or IM Q4H for agitation; EKG to assess for QTc

## 2020-01-31 NOTE — PROGRESS NOTE ADULT - ATTENDING COMMENTS
Patient seen, limited exam due to refusal. Currently no improvement in her mental status. Discussed with endocrine attending regarding causes of encephalopathy. As per Endo, doubts that this is hashimoto's encephalopathy, could still be from the severe hypothyroidism and it can take time for encephalopathy to improve, though reasonable to pursue MRI and LP to r/o other causes. Discussed with rheumatology attending, who has seen patient in prior admission and patient behavior markedly different from prior admission. As per rheum, some case reports of RA causing encephalopathy, should also consider autoimmune encephalitis, and would warrant LP and MRI. Ethics consulted to help advise and guide regarding the extent of pharmacological and physical measures that can be used to assist with medical management. Discussed ethics case w/ Dr. Valenzuela, currently can use pharmacologic agents and restraints in emergent case to obtain labwork and blood draws. Regarding MRI and LP, will currently monitor patient over the weekend for improvement in mental status. and will need address with ethics on monday.    Will attempt to obtain bloodwork and IV access tomorrow to assess repeat TFTs.

## 2020-01-31 NOTE — PROGRESS NOTE ADULT - PROBLEM SELECTOR PLAN 1
, FT4 0.3. Stable vitals on admission, not in myxedema coma.   - continue LT4 100 mcg IV last Free T4 increased to 0.7  - repeat FT4 today.

## 2020-01-31 NOTE — PROGRESS NOTE ADULT - SUBJECTIVE AND OBJECTIVE BOX
Chief Complaint: Evaluating this 63 y/o for uncontrolled Type 2 DM w/ hyperglycemia and hypothyroidism      Interval History: Still with some confusion and altered mental status from baseline. Awaiting repeat TFTs. + po intake.     MEDICATIONS  (STANDING):  budesonide  80 MICROgram(s)/formoterol 4.5 MICROgram(s) Inhaler 2 Puff(s) Inhalation two times a day  dextrose 5%. 1000 milliLiter(s) (50 mL/Hr) IV Continuous <Continuous>  dextrose 50% Injectable 12.5 Gram(s) IV Push once  dextrose 50% Injectable 25 Gram(s) IV Push once  dextrose 50% Injectable 25 Gram(s) IV Push once  enoxaparin Injectable 40 milliGRAM(s) SubCutaneous every 12 hours  gabapentin 400 milliGRAM(s) Oral three times a day  influenza   Vaccine 0.5 milliLiter(s) IntraMuscular once  insulin glargine Injectable (LANTUS) 30 Unit(s) SubCutaneous every morning  insulin lispro (HumaLOG) corrective regimen sliding scale   SubCutaneous three times a day before meals  insulin lispro Injectable (HumaLOG) 9 Unit(s) SubCutaneous three times a day before meals  levothyroxine 200 MICROGram(s) Oral <User Schedule>  metoprolol succinate ER 50 milliGRAM(s) Oral daily  OLANZapine 2.5 milliGRAM(s) Oral two times a day  pantoprazole    Tablet 40 milliGRAM(s) Oral before breakfast  predniSONE   Tablet 15 milliGRAM(s) Oral daily  primidone 50 milliGRAM(s) Oral at bedtime  simvastatin 20 milliGRAM(s) Oral at bedtime    MEDICATIONS  (PRN):  acetaminophen   Tablet .. 650 milliGRAM(s) Oral every 6 hours PRN Temp greater or equal to 38C (100.4F), Mild Pain (1 - 3), Moderate Pain (4 - 6)  albuterol/ipratropium for Nebulization 3 milliLiter(s) Nebulizer every 6 hours PRN Shortness of Breath and/or Wheezing  dextrose 40% Gel 15 Gram(s) Oral once PRN Blood Glucose LESS THAN 70 milliGRAM(s)/deciLiter  glucagon  Injectable 1 milliGRAM(s) IntraMuscular once PRN Glucose <70 milliGRAM(s)/deciLiter  haloperidol    Injectable 2 milliGRAM(s) IntraMuscular every 4 hours PRN Agitation  haloperidol    Injectable 2 milliGRAM(s) IV Push every 4 hours PRN Agitation  melatonin 3 milliGRAM(s) Oral at bedtime PRN Insomnia  traMADol 100 milliGRAM(s) Oral daily PRN Severe Pain (7 - 10)      Allergies    No Known Allergies    Intolerances    Seroquel (Other)    Review of Systems:  Constitutional: No fever  Eyes: No blurry vision  Cardiovascular: No chest pain  Respiratory: No SOB  GI: No abdominal pain, No nausea, No vomiting  Endocrine: as noted in HPI    All other negative      PHYSICAL EXAM:  VITALS: T(C): --  T(F): --  HR: 91 (01-30-20 @ 17:32) (91 - 91)  BP: --  RR: --  SpO2:  (97% - 97%)  Wt(kg): --  GENERAL: NAD at this time  EYES: EOMI, No proptosis  HEENT:  Atraumatic, Normocephalic,   RESPIRATORY: Clear to auscultation bilaterally, full excursion, non labored  CARDIOVASCULAR: Regular rhythm; normal S1/S2, no peripheral edema  GI: Soft, nontender, non distended, normal bowel sounds  SKIN: Warm and dry  PSYCH: normal affect      POCT Blood Glucose.: 129 mg/dL (01-31-20 @ 08:28)  POCT Blood Glucose.: 330 mg/dL (01-30-20 @ 17:14)  POCT Blood Glucose.: 341 mg/dL (01-30-20 @ 13:10)  POCT Blood Glucose.: 181 mg/dL (01-30-20 @ 10:18)  POCT Blood Glucose.: 296 mg/dL (01-29-20 @ 17:56)  POCT Blood Glucose.: 289 mg/dL (01-29-20 @ 12:57)  POCT Blood Glucose.: 126 mg/dL (01-29-20 @ 09:19)  POCT Blood Glucose.: 176 mg/dL (01-28-20 @ 22:41)  POCT Blood Glucose.: 134 mg/dL (01-28-20 @ 18:43)  POCT Blood Glucose.: 155 mg/dL (01-28-20 @ 14:53)        01-28    140  |  108  |  10  ----------------------------<  149<H>  4.1   |  19<L>  |  0.80    EGFR if : 92  EGFR if non : 79    Ca    9.3      01-28  Mg     2.2     01-28  Phos  2.1     01-28          Thyroid Function Tests:  01-28 @ 17:35 TSH -- FreeT4 0.7 T3 -- Anti TPO -- Anti Thyroglobulin Ab -- TSI --  01-27 @ 17:49 TSH 52.30 FreeT4 -- T3 29 Anti TPO -- Anti Thyroglobulin Ab -- TSI --      Hemoglobin A1C, Whole Blood: 9.0 % <H> [4.0 - 5.6] (01-28-20 @ 19:30)  Hemoglobin A1C, Whole Blood: 9.2 % <H> [4.0 - 5.6] (01-27-20 @ 17:36)

## 2020-01-31 NOTE — DIETITIAN INITIAL EVALUATION ADULT. - PROBLEM SELECTOR PLAN 1
-patient does not have hypoglycemia, hypothermia or bradycardia, this is not myxedema coma.  -however concerned about compliants +/- absorption  -will start synthroid 75mcg today IV.  -send tpo labs as possible hashimoto encephalopathy (though less likely)  -endo consult  -no need for hydrocortisone at this time.

## 2020-01-31 NOTE — DIETITIAN INITIAL EVALUATION ADULT. - PHYSICAL APPEARANCE
other (specify) Pt appears well nourished, unable to perform nutrition focused physical exam at this time.  Per RN assessment, pt with no pressure injuries at this time.  Per flow sheets, no edema noted.  Ht: 62 inches Wt: 205.6 pounds BMI: 38.8 kg/m2 IBW: 110 pounds (+/-10%) %%

## 2020-01-31 NOTE — PROGRESS NOTE ADULT - SUBJECTIVE AND OBJECTIVE BOX
Contact Information:  Lala Reddy II, MD, MPH  PGY-1, Internal Medicine  Pager: [phone] ([facility]) /// [phone] ([facility])    AFTAB BASS, MRN-[id number]    Patient is a 64y old  Female who presents with a chief complaint of change in mental status (30 Jan 2020 16:23)      OVERNIGHT EVENTS:    SUBJECTIVE:    CONSTITUTIONAL: No weakness. No fatigue. No fever.  HEAD: No head trauma.   EYES: No vision changes.  ENT: No hearing changes or tinnitus. No ear pain. No changes in smell. No nasal congestion or discharge. No sore throat. No voice hoarseness.   NECK: No neck pain or stiffness. No lumps.  RESPIRATORY: No cough. No SOB. No wheezing. No hemoptysis.   CARDIOVASCULAR: No chest pain. No palpitations.   GASTROINTESTINAL: No dysphagia. No ABD pain. No distension. No constipation. No diarrhea. No pain with defecation. No hematemesis. No hematochezia or melena.  BACK: No back pain.  GENITOURINARY: No dysuria. No frequency or urgency. No hesitancy. No incontinence. No urinary retention. No suprapubic pain. No hematuria.  EXTREMITY: No swelling.  MUSCULOSKELETAL: No joint pain or swelling. No fractures. No stiffness.    SKIN: No rashes. No itching. No skin, hair, or nail changes.  NEUROLOGICAL: No weakness or paralysis. No lightheadedness or dizziness. No HA. No numbness or tingling.   PSYCHIATRIC: No depression.       OBJECTIVE:  Vital Signs Last 24 Hrs  T(C): --  T(F): --  HR: 91 (30 Jan 2020 17:32) (91 - 91)  BP: --  BP(mean): --  RR: --  SpO2: 97% (30 Jan 2020 17:32) (97% - 97%)  I&O's Summary    30 Jan 2020 07:01  -  31 Jan 2020 07:00  --------------------------------------------------------  IN: 940 mL / OUT: 0 mL / NET: 940 mL        MEDICATIONS  (STANDING):  budesonide  80 MICROgram(s)/formoterol 4.5 MICROgram(s) Inhaler 2 Puff(s) Inhalation two times a day  dextrose 5%. 1000 milliLiter(s) (50 mL/Hr) IV Continuous <Continuous>  dextrose 50% Injectable 12.5 Gram(s) IV Push once  dextrose 50% Injectable 25 Gram(s) IV Push once  dextrose 50% Injectable 25 Gram(s) IV Push once  enoxaparin Injectable 40 milliGRAM(s) SubCutaneous every 12 hours  gabapentin 400 milliGRAM(s) Oral three times a day  influenza   Vaccine 0.5 milliLiter(s) IntraMuscular once  insulin glargine Injectable (LANTUS) 30 Unit(s) SubCutaneous every morning  insulin lispro (HumaLOG) corrective regimen sliding scale   SubCutaneous at bedtime  insulin lispro (HumaLOG) corrective regimen sliding scale   SubCutaneous three times a day before meals  insulin lispro Injectable (HumaLOG) 9 Unit(s) SubCutaneous three times a day before meals  levothyroxine 200 MICROGram(s) Oral <User Schedule>  metoprolol succinate ER 50 milliGRAM(s) Oral daily  OLANZapine 2.5 milliGRAM(s) Oral two times a day  pantoprazole    Tablet 40 milliGRAM(s) Oral before breakfast  predniSONE   Tablet 15 milliGRAM(s) Oral daily  primidone 50 milliGRAM(s) Oral at bedtime  simvastatin 20 milliGRAM(s) Oral at bedtime    MEDICATIONS  (PRN):  acetaminophen   Tablet .. 650 milliGRAM(s) Oral every 6 hours PRN Temp greater or equal to 38C (100.4F), Mild Pain (1 - 3), Moderate Pain (4 - 6)  albuterol/ipratropium for Nebulization 3 milliLiter(s) Nebulizer every 6 hours PRN Shortness of Breath and/or Wheezing  dextrose 40% Gel 15 Gram(s) Oral once PRN Blood Glucose LESS THAN 70 milliGRAM(s)/deciLiter  glucagon  Injectable 1 milliGRAM(s) IntraMuscular once PRN Glucose <70 milliGRAM(s)/deciLiter  haloperidol    Injectable 2 milliGRAM(s) IntraMuscular every 4 hours PRN Agitation  haloperidol    Injectable 2 milliGRAM(s) IV Push every 4 hours PRN Agitation  melatonin 3 milliGRAM(s) Oral at bedtime PRN Insomnia  traMADol 100 milliGRAM(s) Oral daily PRN Severe Pain (7 - 10)    Allergies    No Known Allergies    Intolerances    Seroquel (Other)      CONSTITUTIONAL: No acute distress. Awake and alert.  HEAD: No evidence of trauma. Structures WNL.  EYES: +PERRL. +EOMI. No scleral icterus. No conjunctival injection.  ENT: Moist oral mucosa. No erythema. No pharyngeal exudates.   NECK: Supple. Appropriate ROM. No stiffness. No masses or lymphadenopathy.  RESPIRATORY: CTAB. No wheezes, rales, or rhonchi. No accessory muscle use. No apparent respiratory distress.  CARDIOVASCULAR: +S1/S2. No audible S3/S4. Regular rate and rhythm. No murmurs, rubs, or gallops. 2+ radial pulses x b/l UE; 2+ DP pulses x b/l LE.   GASTROINTESTINAL: Soft, nontender, nondistended. +BS. No rebound or guarding.   BACK: No spinal or paraspinal tenderness. No CVA tenderness.  EXTREMITY: No LE swelling or edema. EXTs warm to touch.  MUSCULOSKELETAL: Movement evident in all limbs. No tenderness on palpation.  DERMATOLOGICAL: No abnormal rashes or lesions.  NEUROLOGICAL: CN 2-12 grossly intact. No focal deficits. Sensation intact x 4EXT. A&Ox3 (oriented to person, place, and time).  PSYCHIATRIC: Appropriate affect.                CAPILLARY BLOOD GLUCOSE      POCT Blood Glucose.: 330 mg/dL (30 Jan 2020 17:14)  POCT Blood Glucose.: 341 mg/dL (30 Jan 2020 13:10)  POCT Blood Glucose.: 181 mg/dL (30 Jan 2020 10:18)                  RADIOLOGY AND ADDITIONAL TESTS:    CONSULTANT NOTES REVIEWED:    CARE DISCUSSED WITH THE FOLLOWING CONSULTANTS/PROVIDERS: Contact Information:  Lala Reddy II, MD, MPH  PGY-1, Internal Medicine  Pager: [phone] ([facility]) /// [phone] ([facility])    AFTAB BASS, MRN-[id number]    Patient is a 64y old  Female who presents with a chief complaint of change in mental status (30 Jan 2020 16:23)      OVERNIGHT EVENTS: Patient insists on seeing meds scanned from her room cabinet to ensure meds are hers.    SUBJECTIVE: Patient evaluated at bedside, denies any acute complaints. She states that she is leaving the hospital today.    ROS negative as per patient.      OBJECTIVE:  Vital Signs Last 24 Hrs  T(C): --  T(F): --  HR: 91 (30 Jan 2020 17:32) (91 - 91)  BP: --  BP(mean): --  RR: --  SpO2: 97% (30 Jan 2020 17:32) (97% - 97%)  I&O's Summary    30 Jan 2020 07:01  -  31 Jan 2020 07:00  --------------------------------------------------------  IN: 940 mL / OUT: 0 mL / NET: 940 mL        MEDICATIONS  (STANDING):  budesonide  80 MICROgram(s)/formoterol 4.5 MICROgram(s) Inhaler 2 Puff(s) Inhalation two times a day  dextrose 5%. 1000 milliLiter(s) (50 mL/Hr) IV Continuous <Continuous>  dextrose 50% Injectable 12.5 Gram(s) IV Push once  dextrose 50% Injectable 25 Gram(s) IV Push once  dextrose 50% Injectable 25 Gram(s) IV Push once  enoxaparin Injectable 40 milliGRAM(s) SubCutaneous every 12 hours  gabapentin 400 milliGRAM(s) Oral three times a day  influenza   Vaccine 0.5 milliLiter(s) IntraMuscular once  insulin glargine Injectable (LANTUS) 30 Unit(s) SubCutaneous every morning  insulin lispro (HumaLOG) corrective regimen sliding scale   SubCutaneous at bedtime  insulin lispro (HumaLOG) corrective regimen sliding scale   SubCutaneous three times a day before meals  insulin lispro Injectable (HumaLOG) 9 Unit(s) SubCutaneous three times a day before meals  levothyroxine 200 MICROGram(s) Oral <User Schedule>  metoprolol succinate ER 50 milliGRAM(s) Oral daily  OLANZapine 2.5 milliGRAM(s) Oral two times a day  pantoprazole    Tablet 40 milliGRAM(s) Oral before breakfast  predniSONE   Tablet 15 milliGRAM(s) Oral daily  primidone 50 milliGRAM(s) Oral at bedtime  simvastatin 20 milliGRAM(s) Oral at bedtime    MEDICATIONS  (PRN):  acetaminophen   Tablet .. 650 milliGRAM(s) Oral every 6 hours PRN Temp greater or equal to 38C (100.4F), Mild Pain (1 - 3), Moderate Pain (4 - 6)  albuterol/ipratropium for Nebulization 3 milliLiter(s) Nebulizer every 6 hours PRN Shortness of Breath and/or Wheezing  dextrose 40% Gel 15 Gram(s) Oral once PRN Blood Glucose LESS THAN 70 milliGRAM(s)/deciLiter  glucagon  Injectable 1 milliGRAM(s) IntraMuscular once PRN Glucose <70 milliGRAM(s)/deciLiter  haloperidol    Injectable 2 milliGRAM(s) IntraMuscular every 4 hours PRN Agitation  haloperidol    Injectable 2 milliGRAM(s) IV Push every 4 hours PRN Agitation  melatonin 3 milliGRAM(s) Oral at bedtime PRN Insomnia  traMADol 100 milliGRAM(s) Oral daily PRN Severe Pain (7 - 10)    Allergies    No Known Allergies    Intolerances    Seroquel (Other)      CONSTITUTIONAL: Sleeping but arousable, no acute distress.  RESPIRATORY: Unable to assess due to patient refusal.  CARDIOVASCULAR: Unable to assess due to patient refusal.  GASTROINTESTINAL: +ABD dressing due to evisceration of wound.  BACK: No spinal or paraspinal tenderness. No CVA tenderness.  EXTREMITY: No LE swelling or edema. EXTs warm to touch.  MUSCULOSKELETAL: Unable to assess due to patient refusal.  DERMATOLOGICAL: Chronic LE skin changes. UE ecchymoses. IV in LLE.  NEUROLOGICAL: Global extremity deficits.  PSYCHIATRIC: Somnolent.                CAPILLARY BLOOD GLUCOSE      POCT Blood Glucose.: 330 mg/dL (30 Jan 2020 17:14)  POCT Blood Glucose.: 341 mg/dL (30 Jan 2020 13:10)  POCT Blood Glucose.: 181 mg/dL (30 Jan 2020 10:18)                  RADIOLOGY AND ADDITIONAL TESTS:    CONSULTANT NOTES REVIEWED:    CARE DISCUSSED WITH THE FOLLOWING CONSULTANTS/PROVIDERS:

## 2020-01-31 NOTE — PROGRESS NOTE ADULT - PROBLEM SELECTOR PLAN 6
- C/w prednisone 15 mg  - Tramadol PRN  - Rheum - no rheumatological involvement -  with slight AG (18), BHB 4 (elevated) on presentation; had received insulin prior to presentation  - Per endocrine, lantus 30 + Humalog 9 TID with meals  - Monitor for DKA whenever labs obtained

## 2020-01-31 NOTE — DIETITIAN INITIAL EVALUATION ADULT. - REASON INDICATOR FOR ASSESSMENT
Pt seen for length of stay on 5MONTI.  Information obtained from EMR, RN, and previous RD notes. per RN pt not appropriate to interview due to mental status.

## 2020-02-01 LAB
ALBUMIN SERPL ELPH-MCNC: 3.7 G/DL — SIGNIFICANT CHANGE UP (ref 3.3–5)
ALP SERPL-CCNC: 85 U/L — SIGNIFICANT CHANGE UP (ref 40–120)
ALT FLD-CCNC: 20 U/L — SIGNIFICANT CHANGE UP (ref 10–45)
ANION GAP SERPL CALC-SCNC: 13 MMOL/L — SIGNIFICANT CHANGE UP (ref 5–17)
AST SERPL-CCNC: 19 U/L — SIGNIFICANT CHANGE UP (ref 10–40)
BASOPHILS # BLD AUTO: 0.08 K/UL — SIGNIFICANT CHANGE UP (ref 0–0.2)
BASOPHILS NFR BLD AUTO: 0.8 % — SIGNIFICANT CHANGE UP (ref 0–2)
BILIRUB SERPL-MCNC: <0.1 MG/DL — LOW (ref 0.2–1.2)
BUN SERPL-MCNC: 25 MG/DL — HIGH (ref 7–23)
CALCIUM SERPL-MCNC: 9.1 MG/DL — SIGNIFICANT CHANGE UP (ref 8.4–10.5)
CHLORIDE SERPL-SCNC: 106 MMOL/L — SIGNIFICANT CHANGE UP (ref 96–108)
CO2 SERPL-SCNC: 20 MMOL/L — LOW (ref 22–31)
CREAT SERPL-MCNC: 1.09 MG/DL — SIGNIFICANT CHANGE UP (ref 0.5–1.3)
EOSINOPHIL # BLD AUTO: 0.04 K/UL — SIGNIFICANT CHANGE UP (ref 0–0.5)
EOSINOPHIL NFR BLD AUTO: 0.4 % — SIGNIFICANT CHANGE UP (ref 0–6)
ERYTHROCYTE [SEDIMENTATION RATE] IN BLOOD: 55 MM/HR — HIGH (ref 0–20)
GLUCOSE BLDC GLUCOMTR-MCNC: 122 MG/DL — HIGH (ref 70–99)
GLUCOSE BLDC GLUCOMTR-MCNC: 264 MG/DL — HIGH (ref 70–99)
GLUCOSE SERPL-MCNC: 316 MG/DL — HIGH (ref 70–99)
HCT VFR BLD CALC: 29.2 % — LOW (ref 34.5–45)
HGB BLD-MCNC: 9.1 G/DL — LOW (ref 11.5–15.5)
IMM GRANULOCYTES NFR BLD AUTO: 0.5 % — SIGNIFICANT CHANGE UP (ref 0–1.5)
LYMPHOCYTES # BLD AUTO: 2.87 K/UL — SIGNIFICANT CHANGE UP (ref 1–3.3)
LYMPHOCYTES # BLD AUTO: 27.1 % — SIGNIFICANT CHANGE UP (ref 13–44)
MAGNESIUM SERPL-MCNC: 2 MG/DL — SIGNIFICANT CHANGE UP (ref 1.6–2.6)
MCHC RBC-ENTMCNC: 31.2 GM/DL — LOW (ref 32–36)
MCHC RBC-ENTMCNC: 31.6 PG — SIGNIFICANT CHANGE UP (ref 27–34)
MCV RBC AUTO: 101.4 FL — HIGH (ref 80–100)
MONOCYTES # BLD AUTO: 0.82 K/UL — SIGNIFICANT CHANGE UP (ref 0–0.9)
MONOCYTES NFR BLD AUTO: 7.8 % — SIGNIFICANT CHANGE UP (ref 2–14)
NEUTROPHILS # BLD AUTO: 6.72 K/UL — SIGNIFICANT CHANGE UP (ref 1.8–7.4)
NEUTROPHILS NFR BLD AUTO: 63.4 % — SIGNIFICANT CHANGE UP (ref 43–77)
NRBC # BLD: 0 /100 WBCS — SIGNIFICANT CHANGE UP (ref 0–0)
PHOSPHATE SERPL-MCNC: 3 MG/DL — SIGNIFICANT CHANGE UP (ref 2.5–4.5)
PLATELET # BLD AUTO: 410 K/UL — HIGH (ref 150–400)
POTASSIUM SERPL-MCNC: 5.3 MMOL/L — SIGNIFICANT CHANGE UP (ref 3.5–5.3)
POTASSIUM SERPL-SCNC: 5.3 MMOL/L — SIGNIFICANT CHANGE UP (ref 3.5–5.3)
PROT SERPL-MCNC: 6.5 G/DL — SIGNIFICANT CHANGE UP (ref 6–8.3)
RBC # BLD: 2.88 M/UL — LOW (ref 3.8–5.2)
RBC # FLD: 17.4 % — HIGH (ref 10.3–14.5)
SODIUM SERPL-SCNC: 139 MMOL/L — SIGNIFICANT CHANGE UP (ref 135–145)
WBC # BLD: 10.58 K/UL — HIGH (ref 3.8–10.5)
WBC # FLD AUTO: 10.58 K/UL — HIGH (ref 3.8–10.5)

## 2020-02-01 PROCEDURE — 99233 SBSQ HOSP IP/OBS HIGH 50: CPT | Mod: GC

## 2020-02-01 RX ORDER — OLANZAPINE 15 MG/1
2.5 TABLET, FILM COATED ORAL ONCE
Refills: 0 | Status: COMPLETED | OUTPATIENT
Start: 2020-02-01 | End: 2020-02-01

## 2020-02-01 RX ORDER — OLANZAPINE 15 MG/1
2.5 TABLET, FILM COATED ORAL EVERY 4 HOURS
Refills: 0 | Status: DISCONTINUED | OUTPATIENT
Start: 2020-02-01 | End: 2020-02-05

## 2020-02-01 RX ORDER — ENOXAPARIN SODIUM 100 MG/ML
40 INJECTION SUBCUTANEOUS DAILY
Refills: 0 | Status: DISCONTINUED | OUTPATIENT
Start: 2020-02-01 | End: 2020-02-05

## 2020-02-01 RX ADMIN — TRAMADOL HYDROCHLORIDE 100 MILLIGRAM(S): 50 TABLET ORAL at 21:00

## 2020-02-01 RX ADMIN — OLANZAPINE 2.5 MILLIGRAM(S): 15 TABLET, FILM COATED ORAL at 21:49

## 2020-02-01 RX ADMIN — GABAPENTIN 400 MILLIGRAM(S): 400 CAPSULE ORAL at 08:42

## 2020-02-01 RX ADMIN — Medication 250 MILLIGRAM(S): at 13:00

## 2020-02-01 RX ADMIN — PANTOPRAZOLE SODIUM 40 MILLIGRAM(S): 20 TABLET, DELAYED RELEASE ORAL at 08:43

## 2020-02-01 RX ADMIN — Medication 250 MILLIGRAM(S): at 13:50

## 2020-02-01 RX ADMIN — Medication 250 MILLIGRAM(S): at 20:51

## 2020-02-01 RX ADMIN — GABAPENTIN 400 MILLIGRAM(S): 400 CAPSULE ORAL at 01:37

## 2020-02-01 RX ADMIN — PRIMIDONE 50 MILLIGRAM(S): 250 TABLET ORAL at 20:51

## 2020-02-01 RX ADMIN — TRAMADOL HYDROCHLORIDE 100 MILLIGRAM(S): 50 TABLET ORAL at 20:51

## 2020-02-01 RX ADMIN — Medication 200 MICROGRAM(S): at 08:41

## 2020-02-01 RX ADMIN — INSULIN GLARGINE 30 UNIT(S): 100 INJECTION, SOLUTION SUBCUTANEOUS at 09:23

## 2020-02-01 RX ADMIN — TRAMADOL HYDROCHLORIDE 100 MILLIGRAM(S): 50 TABLET ORAL at 09:30

## 2020-02-01 RX ADMIN — Medication 250 MILLIGRAM(S): at 00:28

## 2020-02-01 RX ADMIN — BUDESONIDE AND FORMOTEROL FUMARATE DIHYDRATE 2 PUFF(S): 160; 4.5 AEROSOL RESPIRATORY (INHALATION) at 16:26

## 2020-02-01 RX ADMIN — OLANZAPINE 2.5 MILLIGRAM(S): 15 TABLET, FILM COATED ORAL at 16:24

## 2020-02-01 RX ADMIN — GABAPENTIN 400 MILLIGRAM(S): 400 CAPSULE ORAL at 16:24

## 2020-02-01 RX ADMIN — SIMVASTATIN 20 MILLIGRAM(S): 20 TABLET, FILM COATED ORAL at 20:51

## 2020-02-01 RX ADMIN — Medication 15 MILLIGRAM(S): at 08:41

## 2020-02-01 RX ADMIN — BUDESONIDE AND FORMOTEROL FUMARATE DIHYDRATE 2 PUFF(S): 160; 4.5 AEROSOL RESPIRATORY (INHALATION) at 05:06

## 2020-02-01 RX ADMIN — Medication 50 MILLIGRAM(S): at 08:43

## 2020-02-01 RX ADMIN — OLANZAPINE 2.5 MILLIGRAM(S): 15 TABLET, FILM COATED ORAL at 09:09

## 2020-02-01 RX ADMIN — TRAMADOL HYDROCHLORIDE 100 MILLIGRAM(S): 50 TABLET ORAL at 08:42

## 2020-02-01 NOTE — PROGRESS NOTE ADULT - PROBLEM SELECTOR PLAN 4
- Follow-up Bcx obtained on 1/25 no growth X 5 days.  - Bcx + for GPR on 1/12 per patient records   - +Bcx from 1/12 sent to [facility] laboratories, currently no results  - Will monitor off Abx - Blood cx obtained on 1/25 no growth X 5 days.  - Bcx + for GPR on 1/12 per patient records   - +Bcx from 1/12 sent to [facility] laboratories, currently no results  - Will monitor off Abx

## 2020-02-01 NOTE — PROGRESS NOTE ADULT - ATTENDING COMMENTS
Patient seen, limited exam due to refusal. Currently no improvement in her mental status. Discussed ethics case w/ Dr. Valenzuela, currently can use pharmacologic agents and restraints in emergent case to obtain labwork and blood draws. Regarding MRI and LP, will currently monitor patient over the weekend for improvement in mental status. and will need address with ethics on monday. Will coordinate to have son come in to help draw labs and create familiar environment for her.

## 2020-02-01 NOTE — PROGRESS NOTE ADULT - PROBLEM SELECTOR PLAN 2
- Per Ethics, given that treatment is in the best interest of patient, it is permissible to use chemical and physical restraint to provide such treatment (see Ethics note)  - Psych has already declared that patient lacks capacity to refuse medications or life-preserving interventions and ana maría Pisano (HCP) is in agreeance.

## 2020-02-01 NOTE — PROGRESS NOTE ADULT - SUBJECTIVE AND OBJECTIVE BOX
AFTAB BASS  64y  Female    Subjective:  No acute events overnight.  Patient continues to be altered and refusing medical intervention.  Patient was seen by ethics consult yesterday.      Vital Signs Last 24 Hrs  T(C): 36.6 (01 Feb 2020 07:41), Max: 36.9 (31 Jan 2020 21:21)  T(F): 97.8 (01 Feb 2020 07:41), Max: 98.5 (31 Jan 2020 21:21)  HR: 79 (01 Feb 2020 07:41) (79 - 103)  BP: 129/79 (01 Feb 2020 07:41) (129/79 - 148/80)  BP(mean): --  RR: 18 (01 Feb 2020 07:41) (18 - 18)  SpO2: 100% (01 Feb 2020 07:41) (97% - 100%)    PHYSICAL EXAM:  GENERAL: NAD, well-groomed, well-developed  HEENT - NC/AT, pupils equal and reactive to light,  ; Moist mucous membranes, Good dentition, No lesions  NECK: Supple, No JVD  CHEST/LUNG: Clear to auscultation bilaterally; No rales, rhonchi, wheezing  HEART: Regular rate and rhythm; No murmurs, rubs, or gallops  ABDOMEN: Soft, Nontender, Nondistended; Bowel sounds present  EXTREMITIES:  2+ Peripheral Pulses, No clubbing, cyanosis, or edema  NEURO:  No Focal deficits, sensory and motor intact  SKIN: No rashes or lesions    Consultant(s) Notes Reviewed:  [x ] YES  [ ] NO  Care Discussed with Consultants/Other Providers [ x] YES  [ ] NO    LABS:          RADIOLOGY & ADDITIONAL TESTS:    Imaging Personally Reviewed:  [ ] YES  [ ] NO  MedsMEDICATIONS  (STANDING):  budesonide  80 MICROgram(s)/formoterol 4.5 MICROgram(s) Inhaler 2 Puff(s) Inhalation two times a day  dextrose 5%. 1000 milliLiter(s) (50 mL/Hr) IV Continuous <Continuous>  dextrose 50% Injectable 12.5 Gram(s) IV Push once  dextrose 50% Injectable 25 Gram(s) IV Push once  dextrose 50% Injectable 25 Gram(s) IV Push once  enoxaparin Injectable 40 milliGRAM(s) SubCutaneous daily  gabapentin 400 milliGRAM(s) Oral three times a day  influenza   Vaccine 0.5 milliLiter(s) IntraMuscular once  insulin glargine Injectable (LANTUS) 30 Unit(s) SubCutaneous every morning  insulin lispro (HumaLOG) corrective regimen sliding scale   SubCutaneous three times a day before meals  insulin lispro Injectable (HumaLOG) 9 Unit(s) SubCutaneous three times a day before meals  levothyroxine 200 MICROGram(s) Oral <User Schedule>  metoprolol succinate ER 50 milliGRAM(s) Oral daily  OLANZapine 2.5 milliGRAM(s) Oral two times a day  pantoprazole    Tablet 40 milliGRAM(s) Oral before breakfast  predniSONE   Tablet 15 milliGRAM(s) Oral daily  primidone 50 milliGRAM(s) Oral at bedtime  simvastatin 20 milliGRAM(s) Oral at bedtime    MEDICATIONS  (PRN):  acetaminophen   Tablet .. 650 milliGRAM(s) Oral every 6 hours PRN Temp greater or equal to 38C (100.4F), Mild Pain (1 - 3), Moderate Pain (4 - 6)  albuterol/ipratropium for Nebulization 3 milliLiter(s) Nebulizer every 6 hours PRN Shortness of Breath and/or Wheezing  dextrose 40% Gel 15 Gram(s) Oral once PRN Blood Glucose LESS THAN 70 milliGRAM(s)/deciLiter  glucagon  Injectable 1 milliGRAM(s) IntraMuscular once PRN Glucose <70 milliGRAM(s)/deciLiter  haloperidol    Injectable 2 milliGRAM(s) IntraMuscular every 4 hours PRN Agitation  haloperidol    Injectable 2 milliGRAM(s) IV Push every 4 hours PRN Agitation  melatonin 3 milliGRAM(s) Oral at bedtime PRN Insomnia  traMADol 100 milliGRAM(s) Oral three times a day PRN Severe Pain (7 - 10)      HEALTH ISSUES - PROBLEM Dx:  Decisional conflict: Decisional conflict  Gram positive bacterial infection: Gram positive bacterial infection  Encephalopathy: Encephalopathy  Type 2 diabetes mellitus with hyperglycemia, with long-term current use of insulin: Type 2 diabetes mellitus with hyperglycemia, with long-term current use of insulin  Hypothyroidism, unspecified type: Hypothyroidism, unspecified type  Discharge planning issues: Discharge planning issues  Need for prophylactic measure: Need for prophylactic measure  Hypothyroid: Hypothyroid  Arthritis: Arthritis  Diabetes mellitus: Diabetes mellitus  Pulmonary hypertension: Pulmonary hypertension  Psychosis, unspecified psychosis type  Altered mental status, unspecified altered mental status type: Altered mental status, unspecified altered mental status type AFTBA BASS  64y  Female    Subjective:  No acute events overnight.  Patient continues to be altered and refusing medical intervention.  Patient was seen by ethics consult yesterday.      Vital Signs Last 24 Hrs  T(C): 36.6 (01 Feb 2020 07:41), Max: 36.9 (31 Jan 2020 21:21)  T(F): 97.8 (01 Feb 2020 07:41), Max: 98.5 (31 Jan 2020 21:21)  HR: 79 (01 Feb 2020 07:41) (79 - 103)  BP: 129/79 (01 Feb 2020 07:41) (129/79 - 148/80)  BP(mean): --  RR: 18 (01 Feb 2020 07:41) (18 - 18)  SpO2: 100% (01 Feb 2020 07:41) (97% - 100%)    PHYSICAL EXAM:  GENERAL:   HEENT:  NECK:   CHEST/LUNG:   HEART:   ABDOMEN:   EXTREMITIES:    NEURO:    SKIN:     Consultant(s) Notes Reviewed:  [x ] YES  [ ] NO  Care Discussed with Consultants/Other Providers [ x] YES  [ ] NO    LABS: None      Imaging Personally Reviewed:  [ ] YES  [ ] NO  MedsMEDICATIONS  (STANDING):  budesonide  80 MICROgram(s)/formoterol 4.5 MICROgram(s) Inhaler 2 Puff(s) Inhalation two times a day  dextrose 5%. 1000 milliLiter(s) (50 mL/Hr) IV Continuous <Continuous>  dextrose 50% Injectable 12.5 Gram(s) IV Push once  dextrose 50% Injectable 25 Gram(s) IV Push once  dextrose 50% Injectable 25 Gram(s) IV Push once  enoxaparin Injectable 40 milliGRAM(s) SubCutaneous daily  gabapentin 400 milliGRAM(s) Oral three times a day  influenza   Vaccine 0.5 milliLiter(s) IntraMuscular once  insulin glargine Injectable (LANTUS) 30 Unit(s) SubCutaneous every morning  insulin lispro (HumaLOG) corrective regimen sliding scale   SubCutaneous three times a day before meals  insulin lispro Injectable (HumaLOG) 9 Unit(s) SubCutaneous three times a day before meals  levothyroxine 200 MICROGram(s) Oral <User Schedule>  metoprolol succinate ER 50 milliGRAM(s) Oral daily  OLANZapine 2.5 milliGRAM(s) Oral two times a day  pantoprazole    Tablet 40 milliGRAM(s) Oral before breakfast  predniSONE   Tablet 15 milliGRAM(s) Oral daily  primidone 50 milliGRAM(s) Oral at bedtime  simvastatin 20 milliGRAM(s) Oral at bedtime    MEDICATIONS  (PRN):  acetaminophen   Tablet .. 650 milliGRAM(s) Oral every 6 hours PRN Temp greater or equal to 38C (100.4F), Mild Pain (1 - 3), Moderate Pain (4 - 6)  albuterol/ipratropium for Nebulization 3 milliLiter(s) Nebulizer every 6 hours PRN Shortness of Breath and/or Wheezing  dextrose 40% Gel 15 Gram(s) Oral once PRN Blood Glucose LESS THAN 70 milliGRAM(s)/deciLiter  glucagon  Injectable 1 milliGRAM(s) IntraMuscular once PRN Glucose <70 milliGRAM(s)/deciLiter  haloperidol    Injectable 2 milliGRAM(s) IntraMuscular every 4 hours PRN Agitation  haloperidol    Injectable 2 milliGRAM(s) IV Push every 4 hours PRN Agitation  melatonin 3 milliGRAM(s) Oral at bedtime PRN Insomnia  traMADol 100 milliGRAM(s) Oral three times a day PRN Severe Pain (7 - 10)      HEALTH ISSUES - PROBLEM Dx:  Decisional conflict: Decisional conflict  Gram positive bacterial infection: Gram positive bacterial infection  Encephalopathy: Encephalopathy  Type 2 diabetes mellitus with hyperglycemia, with long-term current use of insulin: Type 2 diabetes mellitus with hyperglycemia, with long-term current use of insulin  Hypothyroidism, unspecified type: Hypothyroidism, unspecified type  Discharge planning issues: Discharge planning issues  Need for prophylactic measure: Need for prophylactic measure  Hypothyroid: Hypothyroid  Arthritis: Arthritis  Diabetes mellitus: Diabetes mellitus  Pulmonary hypertension: Pulmonary hypertension  Psychosis, unspecified psychosis type  Altered mental status, unspecified altered mental status type: Altered mental status, unspecified altered mental status type AFTAB BASS  64y  Female    Subjective:  No acute events overnight.  Patient continues to be altered and refusing medical intervention.  Patient was seen by ethics consult yesterday. Patient was given naproxen 250 mg X 1 for arthritis pain and gabapentin was given early.      Vital Signs Last 24 Hrs  T(C): 36.6 (01 Feb 2020 07:41), Max: 36.9 (31 Jan 2020 21:21)  T(F): 97.8 (01 Feb 2020 07:41), Max: 98.5 (31 Jan 2020 21:21)  HR: 79 (01 Feb 2020 07:41) (79 - 103)  BP: 129/79 (01 Feb 2020 07:41) (129/79 - 148/80)  BP(mean): --  RR: 18 (01 Feb 2020 07:41) (18 - 18)  SpO2: 100% (01 Feb 2020 07:41) (97% - 100%)    PHYSICAL EXAM:  GENERAL:   HEENT:  NECK:   CHEST/LUNG:   HEART:   ABDOMEN:   EXTREMITIES:    NEURO:    SKIN:     Consultant(s) Notes Reviewed:  [x ] YES  [ ] NO  Care Discussed with Consultants/Other Providers [ x] YES  [ ] NO    LABS: None      Imaging Personally Reviewed:  [ ] YES  [ ] NO  MedsMEDICATIONS  (STANDING):  budesonide  80 MICROgram(s)/formoterol 4.5 MICROgram(s) Inhaler 2 Puff(s) Inhalation two times a day  dextrose 5%. 1000 milliLiter(s) (50 mL/Hr) IV Continuous <Continuous>  dextrose 50% Injectable 12.5 Gram(s) IV Push once  dextrose 50% Injectable 25 Gram(s) IV Push once  dextrose 50% Injectable 25 Gram(s) IV Push once  enoxaparin Injectable 40 milliGRAM(s) SubCutaneous daily  gabapentin 400 milliGRAM(s) Oral three times a day  influenza   Vaccine 0.5 milliLiter(s) IntraMuscular once  insulin glargine Injectable (LANTUS) 30 Unit(s) SubCutaneous every morning  insulin lispro (HumaLOG) corrective regimen sliding scale   SubCutaneous three times a day before meals  insulin lispro Injectable (HumaLOG) 9 Unit(s) SubCutaneous three times a day before meals  levothyroxine 200 MICROGram(s) Oral <User Schedule>  metoprolol succinate ER 50 milliGRAM(s) Oral daily  OLANZapine 2.5 milliGRAM(s) Oral two times a day  pantoprazole    Tablet 40 milliGRAM(s) Oral before breakfast  predniSONE   Tablet 15 milliGRAM(s) Oral daily  primidone 50 milliGRAM(s) Oral at bedtime  simvastatin 20 milliGRAM(s) Oral at bedtime    MEDICATIONS  (PRN):  acetaminophen   Tablet .. 650 milliGRAM(s) Oral every 6 hours PRN Temp greater or equal to 38C (100.4F), Mild Pain (1 - 3), Moderate Pain (4 - 6)  albuterol/ipratropium for Nebulization 3 milliLiter(s) Nebulizer every 6 hours PRN Shortness of Breath and/or Wheezing  dextrose 40% Gel 15 Gram(s) Oral once PRN Blood Glucose LESS THAN 70 milliGRAM(s)/deciLiter  glucagon  Injectable 1 milliGRAM(s) IntraMuscular once PRN Glucose <70 milliGRAM(s)/deciLiter  haloperidol    Injectable 2 milliGRAM(s) IntraMuscular every 4 hours PRN Agitation  haloperidol    Injectable 2 milliGRAM(s) IV Push every 4 hours PRN Agitation  melatonin 3 milliGRAM(s) Oral at bedtime PRN Insomnia  traMADol 100 milliGRAM(s) Oral three times a day PRN Severe Pain (7 - 10)      HEALTH ISSUES - PROBLEM Dx:  Decisional conflict: Decisional conflict  Gram positive bacterial infection: Gram positive bacterial infection  Encephalopathy: Encephalopathy  Type 2 diabetes mellitus with hyperglycemia, with long-term current use of insulin: Type 2 diabetes mellitus with hyperglycemia, with long-term current use of insulin  Hypothyroidism, unspecified type: Hypothyroidism, unspecified type  Discharge planning issues: Discharge planning issues  Need for prophylactic measure: Need for prophylactic measure  Hypothyroid: Hypothyroid  Arthritis: Arthritis  Diabetes mellitus: Diabetes mellitus  Pulmonary hypertension: Pulmonary hypertension  Psychosis, unspecified psychosis type  Altered mental status, unspecified altered mental status type: Altered mental status, unspecified altered mental status type AFTAB BASS  64y  Female    Subjective:  No acute events overnight.  Patient continues to be altered and refusing medical intervention.  Patient was seen by ethics consult yesterday. Patient was given naproxen 250 mg X 1 for arthritis pain and gabapentin was given early.      Vital Signs Last 24 Hrs  T(C): 36.6 (01 Feb 2020 07:41), Max: 36.9 (31 Jan 2020 21:21)  T(F): 97.8 (01 Feb 2020 07:41), Max: 98.5 (31 Jan 2020 21:21)  HR: 79 (01 Feb 2020 07:41) (79 - 103)  BP: 129/79 (01 Feb 2020 07:41) (129/79 - 148/80)  BP(mean): --  RR: 18 (01 Feb 2020 07:41) (18 - 18)  SpO2: 100% (01 Feb 2020 07:41) (97% - 100%)    PHYSICAL EXAM:  GENERAL: NAD, obese  HEENT: EOMI, normocephalic  NECK: supple  CHEST/LUNG: CTAB  HEART: S1/S2, RRR  ABDOMEN: distended, soft, + BS  EXTREMITIES:  + pulses, warm-well perfused  NEURO:  3/5 left LE, 1/5 right LE, 5/5 right UE, 1/5 left UE    Consultant(s) Notes Reviewed:  [x ] YES  [ ] NO  Care Discussed with Consultants/Other Providers [ x] YES  [ ] NO    LABS: None      Imaging Personally Reviewed:  [ ] YES  [ ] NO  MedsMEDICATIONS  (STANDING):  budesonide  80 MICROgram(s)/formoterol 4.5 MICROgram(s) Inhaler 2 Puff(s) Inhalation two times a day  dextrose 5%. 1000 milliLiter(s) (50 mL/Hr) IV Continuous <Continuous>  dextrose 50% Injectable 12.5 Gram(s) IV Push once  dextrose 50% Injectable 25 Gram(s) IV Push once  dextrose 50% Injectable 25 Gram(s) IV Push once  enoxaparin Injectable 40 milliGRAM(s) SubCutaneous daily  gabapentin 400 milliGRAM(s) Oral three times a day  influenza   Vaccine 0.5 milliLiter(s) IntraMuscular once  insulin glargine Injectable (LANTUS) 30 Unit(s) SubCutaneous every morning  insulin lispro (HumaLOG) corrective regimen sliding scale   SubCutaneous three times a day before meals  insulin lispro Injectable (HumaLOG) 9 Unit(s) SubCutaneous three times a day before meals  levothyroxine 200 MICROGram(s) Oral <User Schedule>  metoprolol succinate ER 50 milliGRAM(s) Oral daily  OLANZapine 2.5 milliGRAM(s) Oral two times a day  pantoprazole    Tablet 40 milliGRAM(s) Oral before breakfast  predniSONE   Tablet 15 milliGRAM(s) Oral daily  primidone 50 milliGRAM(s) Oral at bedtime  simvastatin 20 milliGRAM(s) Oral at bedtime    MEDICATIONS  (PRN):  acetaminophen   Tablet .. 650 milliGRAM(s) Oral every 6 hours PRN Temp greater or equal to 38C (100.4F), Mild Pain (1 - 3), Moderate Pain (4 - 6)  albuterol/ipratropium for Nebulization 3 milliLiter(s) Nebulizer every 6 hours PRN Shortness of Breath and/or Wheezing  dextrose 40% Gel 15 Gram(s) Oral once PRN Blood Glucose LESS THAN 70 milliGRAM(s)/deciLiter  glucagon  Injectable 1 milliGRAM(s) IntraMuscular once PRN Glucose <70 milliGRAM(s)/deciLiter  haloperidol    Injectable 2 milliGRAM(s) IntraMuscular every 4 hours PRN Agitation  haloperidol    Injectable 2 milliGRAM(s) IV Push every 4 hours PRN Agitation  melatonin 3 milliGRAM(s) Oral at bedtime PRN Insomnia  traMADol 100 milliGRAM(s) Oral three times a day PRN Severe Pain (7 - 10)      HEALTH ISSUES - PROBLEM Dx:  Decisional conflict: Decisional conflict  Gram positive bacterial infection: Gram positive bacterial infection  Encephalopathy: Encephalopathy  Type 2 diabetes mellitus with hyperglycemia, with long-term current use of insulin: Type 2 diabetes mellitus with hyperglycemia, with long-term current use of insulin  Hypothyroidism, unspecified type: Hypothyroidism, unspecified type  Discharge planning issues: Discharge planning issues  Need for prophylactic measure: Need for prophylactic measure  Hypothyroid: Hypothyroid  Arthritis: Arthritis  Diabetes mellitus: Diabetes mellitus  Pulmonary hypertension: Pulmonary hypertension  Psychosis, unspecified psychosis type  Altered mental status, unspecified altered mental status type: Altered mental status, unspecified altered mental status type

## 2020-02-01 NOTE — PROGRESS NOTE ADULT - PROBLEM SELECTOR PLAN 1
- Unclear etiology; Ddx: Endocrinopathy (given abnormal thyroid dysfunction) vs psychiatric vs infectious  - Per son, mental status change for 3-4w, characterized by increased aggressiveness and distrust of family and home health aids    - Highly aggressive and physical  - Seen by Neuro, no acute intervention  - CT from previous admission benign, consider MRI if condition worsens  - Per Psych, Zyprexa 2.5mg BID + Haldol 2mg IV or IM Q4H for agitation; EKG to assess for QTc - Unclear etiology; Ddx: Endocrinopathy (given abnormal thyroid dysfunction) vs psychiatric vs infectious  - Likely organic etiology of altered mental status.  -Patient will need LP and MR imaging studies; however, patient is uncooperative and aggressive.  - Per son, mental status change for 3-4w, characterized by increased aggressiveness and distrust of family and home health aids  - Highly aggressive and physical  - Seen by Neuro, no acute intervention  - Per Psych, Zyprexa 2.5mg BID + Haldol 2mg IV or IM Q4H for agitation; EKG to assess for QTc

## 2020-02-01 NOTE — PROGRESS NOTE ADULT - PROBLEM SELECTOR PLAN 5
- Moderate pulmonary HTN per TTE (1/13)  - Per son, sildenafil was discontinued   - Continue with Toprol 50

## 2020-02-01 NOTE — PROGRESS NOTE ADULT - PROBLEM SELECTOR PLAN 6
-  with slight AG (18), BHB 4 (elevated) on presentation; had received insulin prior to presentation  - Per endocrine, lantus 30 + Humalog 9 TID with meals  - Monitor for DKA whenever labs obtained

## 2020-02-01 NOTE — PROGRESS NOTE ADULT - PROBLEM SELECTOR PLAN 7
- C/w prednisone 15 mg  - Tramadol PRN  - Rheum - no rheumatological involvement - Continue with prednisone 15 mg  - Tramadol PRN  - Rheum - no rheumatological involvement

## 2020-02-01 NOTE — PROGRESS NOTE ADULT - PROBLEM SELECTOR PLAN 3
-Endocrinology consulted  -  on admission; most recent TSH 52, TPO Ab elevated  - Continue with synthroid PO  - Pending repeat thyroid labs

## 2020-02-02 ENCOUNTER — TRANSCRIPTION ENCOUNTER (OUTPATIENT)
Age: 65
End: 2020-02-02

## 2020-02-02 LAB
CRP SERPL-MCNC: 1 MG/DL — HIGH (ref 0–0.4)
GLUCOSE BLDC GLUCOMTR-MCNC: 319 MG/DL — HIGH (ref 70–99)
GLUCOSE BLDC GLUCOMTR-MCNC: 342 MG/DL — HIGH (ref 70–99)
T3FREE SERPL-MCNC: 1.4 PG/ML — LOW (ref 1.8–4.6)
T4 FREE SERPL-MCNC: 1.3 NG/DL — SIGNIFICANT CHANGE UP (ref 0.9–1.8)
TSH SERPL-MCNC: 8.35 UIU/ML — HIGH (ref 0.27–4.2)

## 2020-02-02 PROCEDURE — 99233 SBSQ HOSP IP/OBS HIGH 50: CPT | Mod: GC

## 2020-02-02 RX ORDER — HALOPERIDOL DECANOATE 100 MG/ML
2 INJECTION INTRAMUSCULAR ONCE
Refills: 0 | Status: COMPLETED | OUTPATIENT
Start: 2020-02-02 | End: 2020-02-02

## 2020-02-02 RX ADMIN — PRIMIDONE 50 MILLIGRAM(S): 250 TABLET ORAL at 21:30

## 2020-02-02 RX ADMIN — INSULIN GLARGINE 30 UNIT(S): 100 INJECTION, SOLUTION SUBCUTANEOUS at 17:00

## 2020-02-02 RX ADMIN — OLANZAPINE 2.5 MILLIGRAM(S): 15 TABLET, FILM COATED ORAL at 16:11

## 2020-02-02 RX ADMIN — TRAMADOL HYDROCHLORIDE 100 MILLIGRAM(S): 50 TABLET ORAL at 10:30

## 2020-02-02 RX ADMIN — ENOXAPARIN SODIUM 40 MILLIGRAM(S): 100 INJECTION SUBCUTANEOUS at 13:47

## 2020-02-02 RX ADMIN — Medication 9 UNIT(S): at 17:00

## 2020-02-02 RX ADMIN — Medication 8: at 13:44

## 2020-02-02 RX ADMIN — HALOPERIDOL DECANOATE 2 MILLIGRAM(S): 100 INJECTION INTRAMUSCULAR at 22:22

## 2020-02-02 RX ADMIN — OLANZAPINE 2.5 MILLIGRAM(S): 15 TABLET, FILM COATED ORAL at 09:33

## 2020-02-02 RX ADMIN — TRAMADOL HYDROCHLORIDE 100 MILLIGRAM(S): 50 TABLET ORAL at 15:20

## 2020-02-02 RX ADMIN — GABAPENTIN 400 MILLIGRAM(S): 400 CAPSULE ORAL at 00:29

## 2020-02-02 RX ADMIN — TRAMADOL HYDROCHLORIDE 100 MILLIGRAM(S): 50 TABLET ORAL at 16:20

## 2020-02-02 RX ADMIN — TRAMADOL HYDROCHLORIDE 100 MILLIGRAM(S): 50 TABLET ORAL at 22:15

## 2020-02-02 RX ADMIN — GABAPENTIN 400 MILLIGRAM(S): 400 CAPSULE ORAL at 09:31

## 2020-02-02 RX ADMIN — Medication 15 MILLIGRAM(S): at 09:31

## 2020-02-02 RX ADMIN — HALOPERIDOL DECANOATE 2 MILLIGRAM(S): 100 INJECTION INTRAMUSCULAR at 13:46

## 2020-02-02 RX ADMIN — OLANZAPINE 2.5 MILLIGRAM(S): 15 TABLET, FILM COATED ORAL at 17:41

## 2020-02-02 RX ADMIN — Medication 8: at 16:59

## 2020-02-02 RX ADMIN — Medication 250 MILLIGRAM(S): at 14:00

## 2020-02-02 RX ADMIN — Medication 9 UNIT(S): at 13:45

## 2020-02-02 RX ADMIN — Medication 250 MILLIGRAM(S): at 21:28

## 2020-02-02 RX ADMIN — BUDESONIDE AND FORMOTEROL FUMARATE DIHYDRATE 2 PUFF(S): 160; 4.5 AEROSOL RESPIRATORY (INHALATION) at 05:54

## 2020-02-02 RX ADMIN — Medication 50 MILLIGRAM(S): at 09:30

## 2020-02-02 RX ADMIN — Medication 200 MICROGRAM(S): at 13:03

## 2020-02-02 RX ADMIN — Medication 250 MILLIGRAM(S): at 13:03

## 2020-02-02 RX ADMIN — GABAPENTIN 400 MILLIGRAM(S): 400 CAPSULE ORAL at 17:01

## 2020-02-02 RX ADMIN — TRAMADOL HYDROCHLORIDE 100 MILLIGRAM(S): 50 TABLET ORAL at 09:32

## 2020-02-02 RX ADMIN — PANTOPRAZOLE SODIUM 40 MILLIGRAM(S): 20 TABLET, DELAYED RELEASE ORAL at 09:31

## 2020-02-02 RX ADMIN — SIMVASTATIN 20 MILLIGRAM(S): 20 TABLET, FILM COATED ORAL at 21:28

## 2020-02-02 NOTE — PROGRESS NOTE ADULT - ATTENDING COMMENTS
Patient seen, unable to examine today due to patient's refusal. TSH level improved, will f/u endo recs. Will f/u ethic tomorrow regarding pursuing MRI/LP.

## 2020-02-02 NOTE — DISCHARGE NOTE PROVIDER - NSDCCAREPROVSEEN_GEN_ALL_CORE_FT
[facility] Medicine, 5M Care Model Team B [facility] Medicine, 5M Care Model Team B  Zara Herrmann [facility] Medicine, 5M Care Model Team B  Zara Herrmann [place]

## 2020-02-02 NOTE — PROGRESS NOTE ADULT - PROBLEM SELECTOR PLAN 1
, FT4 0.3. Stable vitals on admission, not in myxedema coma.   - continue LT4 100 mcg IV   - last Free T4 increased to 1.3

## 2020-02-02 NOTE — DISCHARGE NOTE PROVIDER - CARE PROVIDER_API CALL
Kristie Nails (DO)  Family Medicine  [address] [place] [address]  Phone: [phone]  Fax: [phone]  Follow Up Time:

## 2020-02-02 NOTE — PROGRESS NOTE ADULT - SUBJECTIVE AND OBJECTIVE BOX
Chief Complaint: T2DM and hypothyroidism     History: Variable glucoses due to refusal to take insulin.     MEDICATIONS  (STANDING):  budesonide  80 MICROgram(s)/formoterol 4.5 MICROgram(s) Inhaler 2 Puff(s) Inhalation two times a day  dextrose 5%. 1000 milliLiter(s) (50 mL/Hr) IV Continuous <Continuous>  dextrose 50% Injectable 12.5 Gram(s) IV Push once  dextrose 50% Injectable 25 Gram(s) IV Push once  dextrose 50% Injectable 25 Gram(s) IV Push once  enoxaparin Injectable 40 milliGRAM(s) SubCutaneous daily  gabapentin 400 milliGRAM(s) Oral three times a day  influenza   Vaccine 0.5 milliLiter(s) IntraMuscular once  insulin glargine Injectable (LANTUS) 30 Unit(s) SubCutaneous every morning  insulin lispro (HumaLOG) corrective regimen sliding scale   SubCutaneous three times a day before meals  insulin lispro Injectable (HumaLOG) 9 Unit(s) SubCutaneous three times a day before meals  levothyroxine 200 MICROGram(s) Oral <User Schedule>  metoprolol succinate ER 50 milliGRAM(s) Oral daily  OLANZapine 2.5 milliGRAM(s) Oral two times a day  pantoprazole    Tablet 40 milliGRAM(s) Oral before breakfast  predniSONE   Tablet 15 milliGRAM(s) Oral daily  primidone 50 milliGRAM(s) Oral at bedtime  simvastatin 20 milliGRAM(s) Oral at bedtime    MEDICATIONS  (PRN):  acetaminophen   Tablet .. 650 milliGRAM(s) Oral every 6 hours PRN Temp greater or equal to 38C (100.4F), Mild Pain (1 - 3), Moderate Pain (4 - 6)  albuterol/ipratropium for Nebulization 3 milliLiter(s) Nebulizer every 6 hours PRN Shortness of Breath and/or Wheezing  dextrose 40% Gel 15 Gram(s) Oral once PRN Blood Glucose LESS THAN 70 milliGRAM(s)/deciLiter  glucagon  Injectable 1 milliGRAM(s) IntraMuscular once PRN Glucose <70 milliGRAM(s)/deciLiter  haloperidol    Injectable 2 milliGRAM(s) IntraMuscular every 4 hours PRN Agitation  haloperidol    Injectable 2 milliGRAM(s) IV Push every 4 hours PRN Agitation  melatonin 3 milliGRAM(s) Oral at bedtime PRN Insomnia  naproxen 250 milliGRAM(s) Oral every 6 hours PRN Moderate Pain (4 - 6)  OLANZapine Injectable 2.5 milliGRAM(s) IntraMuscular every 4 hours PRN agitation  traMADol 100 milliGRAM(s) Oral three times a day PRN Severe Pain (7 - 10)      Allergies    No Known Allergies    Intolerances    Seroquel (Other)      PHYSICAL EXAM:  VITALS: T(C): 36.4 (02-02-20 @ 06:33)  T(F): 97.5 (02-02-20 @ 06:33), Max: 97.5 (02-02-20 @ 06:33)  HR: 85 (02-02-20 @ 06:33) (85 - 88)  BP: 138/77 (02-02-20 @ 06:33) (138/77 - 138/77)  RR:  (16 - 16)  SpO2:  (98% - 100%)  Wt(kg): --  GENERAL: NAD, well-groomed, well-developed  EYES: No proptosis,  anicteric  HEENT:  Atraumatic, Normocephalic, moist mucous membranes  NEURO: AOx2, moves all extremities spontaenuously   PSYCH:  reactive affect, euthymic mood      POCT Blood Glucose.: 319 mg/dL (02-02-20 @ 13:42)  POCT Blood Glucose.: 264 mg/dL (02-01-20 @ 12:46)  POCT Blood Glucose.: 122 mg/dL (02-01-20 @ 09:06)  POCT Blood Glucose.: 326 mg/dL (01-31-20 @ 17:30)  POCT Blood Glucose.: 129 mg/dL (01-31-20 @ 08:28)  POCT Blood Glucose.: 330 mg/dL (01-30-20 @ 17:14)      02-01    139  |  106  |  25<H>  ----------------------------<  316<H>  5.3   |  20<L>  |  1.09    EGFR if : 63  EGFR if non : 54<L>    Ca    9.1      02-01  Mg     2.0     02-01  Phos  3.0     02-01    TPro  6.5  /  Alb  3.7  /  TBili  <0.1<L>  /  DBili  x   /  AST  19  /  ALT  20  /  AlkPhos  85  02-01          Thyroid Function Tests:  02-01 @ 22:37 TSH 8.35 FreeT4 1.3 T3 -- Anti TPO -- Anti Thyroglobulin Ab -- TSI --  01-28 @ 17:35 TSH -- FreeT4 0.7 T3 -- Anti TPO -- Anti Thyroglobulin Ab -- TSI --      Hemoglobin A1C, Whole Blood: 9.0 % <H> [4.0 - 5.6] (01-28-20 @ 19:30)  Hemoglobin A1C, Whole Blood: 9.2 % <H> [4.0 - 5.6] (01-27-20 @ 17:36)

## 2020-02-02 NOTE — PROGRESS NOTE ADULT - PROBLEM SELECTOR PLAN 1
- Unclear etiology though most likely organic; Ddx: Autoimmune encephalitis 2/2 longstanding RA vs Endocrinopathy (given abnormal thyroid dysfunction but unlikely given progressive correction of TSH with minimal change in behavior) vs psychiatric vs infectious  - Recommended Rheum labs acquired via lab draw  - Patient will need LP and MR imaging studies; however, patient is uncooperative and aggressive  - Per son, mental status change for 3-4w, characterized by increased aggressiveness and distrust of family and home health aids  - Seen by Neuro, no acute intervention  - Per Psych, Zyprexa 2.5mg BID + Haldol 2mg IV or IM Q4H for agitation; EKG to assess for QTc - Unclear etiology though most likely organic; Ddx: Autoimmune encephalitis 2/2 longstanding RA vs Endocrinopathy (given abnormal thyroid dysfunction but unlikely given progressive correction of TSH/FT4 with minimal change in behavior) vs psychiatric vs infectious  - Patient will need LP and MR imaging studies; however, patient is uncooperative and aggressive; f/u with Ethics to coordinate planning for required procedures. LP will likely require IR and MRI will likely require Anesthesia  - Per Psych, Zyprexa 2.5mg BID + Haldol 2mg IV or IM Q4H for agitation; EKG to assess for QTc

## 2020-02-02 NOTE — PROGRESS NOTE ADULT - PROBLEM SELECTOR PLAN 4
- Blood cx obtained on 1/25 no growth X 5 days.  - Bcx + for GPR on 1/12 per patient records   - +Bcx from 1/12 sent to [facility] laboratories, currently no results  - Will monitor off Abx

## 2020-02-02 NOTE — DISCHARGE NOTE PROVIDER - HOSPITAL COURSE
64F PMHx functional paraplegia bedbound, hypothyroidism, pulmonary HTN, DM on insulin, COPD/CHRIS on noctournal CPAP and 3L home), RA on chronic prednisone, chronic tremors, SBO s/p ex lap with lysis of adhesions (4 retention sutures) c/b eviscerated 2/2 a coughing episode s/p ex lap (6 retention sutures) c/b multifocal pneumonia, developed hypercapnic respiratory failure requiring intubation 1/6/19, s/p bronch 1/9/19, and extubated on 1/24/19, course further c/b Enterococci bacteremia.        Patient unreliable historian; history obtained from Son Norris (lives w/ patient) and daughter who claim to be HCP. Patient's behavior was first noted to change 3weeks ago. She was accusatory of family for 'stealing $180k from her closet", repeatedly calling back restaurants of which she ordered from claiming she never received deliver when she did, also called police on multiple occasions claiming son was trying to hurt her as well as accusing him of "stealing her oxygen". Patient has fired multiple HHAs claiming they were not useful for her needs. patient's son has also noticed she would drink about "1-2 gallons" of water a day however did not notice change in urination habits. Also c/o constipation, of which son had given OTC meds to help, patient had 1 "baseball size, hard stool" after straining w/o blood described as brown in color. Recently has been spitting at HHAs and caretaker son, throwing her bedpan filled with urine and feces at them.         Patient was taken to [facility] for evaluation; was evaluated by psych and deemed to have capacity to leave AMA. Patient w/ elevated WBC and SAM; also later blood culture x1 bottle significant for gram positive rods was sent to laboratory outside hospital system for speciation.         In the ED: Patient agitated requiring Zyprexa total 20mg, haloperidol 10, versed 8mg. History of Present Illness    64F PMHx functional paraplegia bedbound, hypothyroidism, pulmonary HTN, DM on insulin, COPD/CHRIS on noctournal CPAP and 3L home), RA on chronic prednisone, chronic tremors, SBO s/p ex lap with lysis of adhesions (4 retention sutures) c/b eviscerated 2/2 a coughing episode s/p ex lap (6 retention sutures) c/b multifocal pneumonia, developed hypercapnic respiratory failure requiring intubation 1/6/19, s/p bronch 1/9/19, and extubated on 1/24/19, course further c/b Enterococci bacteremia.        Patient unreliable historian; history obtained from Son Norris (lives w/ patient) and daughter who claim to be HCP. Patient's behavior was first noted to change 3weeks ago. She was accusatory of family for 'stealing $180k from her closet", repeatedly calling back restaurants of which she ordered from claiming she never received deliver when she did, also called police on multiple occasions claiming son was trying to hurt her as well as accusing him of "stealing her oxygen". Patient has fired multiple HHAs claiming they were not useful for her needs. patient's son has also noticed she would drink about "1-2 gallons" of water a day however did not notice change in urination habits. Also c/o constipation, of which son had given OTC meds to help, patient had 1 "baseball size, hard stool" after straining w/o blood described as brown in color. Recently has been spitting at HHAs and caretaker son, throwing her bedpan filled with urine and feces at them.         Patient was taken to [facility] for evaluation; she was evaluated by psych and deemed to have capacity to leave AMA. Patient w/ elevated WBC and SAM; also later blood culture x1 bottle significant for gram positive rods was sent to laboratory outside hospital system for speciation.         Emergency Department Course    In the ED: VS -143/61-95, HR , RR 17-22, O2 Sat % on supplemental O2. Labs - H/H 10/31.8, CBC otherwise normal. Bicarb 20, AG 18, Glu 239. Beta-hydroxy butyrate 4. , T3 24, T3 1.6. Therapeutic drug toxicity screen negative. Patient agitated requiring Zyprexa total 20mg, haloperidol 10, versed 8mg.        Hospital Course    Patient admitted to the medicine floors. Repeat blood cultures drawn and were negative throughout hospital course. On arrival, patient refusing all medications and interventions and the aforementioned behavior change was still evident. Psychiatry consulted, deemed that patient has no capacity to refuse treatments or to leave hospital; they also recommended haldol as needed and zyprexa standing. Endocrinology was consulted due to patient's severely depressed thyroid function and metabolic derangement due to glucose dysregulation; she was initiated on thyroid supplementation and a glycemic control regimen. Neurology was consulted for change in mental status; no neurological intervention was offered as the patient's symptoms were thought to be due to endocrinopathy. The patient's thyroid function was noted to improve, but her behavior did not improve. Endocrinology stated that her symptoms cannot be attributed to Hashimoto's encephalopathy unless imaging and lumbar puncture are obtained. Rheumatology was consulted and suggested that her symptoms may be due to autoimmune encephalitis secondary to her longstanding RA; the only way to confirm the diagnosis is through lab work, imaging, and lumbar puncture. Ethics was consulted to clarify boundaries of providing medical intervention amidst patient refusal; they stated that chemical and physical restraints can be utilized to implement measures to protect patient health. 64F w/ a PMHx arthritis, hypothyroidism, pulmonary HTN, DM on insulin, HTN, COPD/CHRIS on noctournal CPAP and 3L home), RA on chronic prednisone, chronic tremors, SBO s/p ex lap with lysis of adhesions c/b evisceration 2/2 a coughing episode s/p repeat ex lap (c/b multifocal pneumonia and hypercapnic respiratory failure requiring intubation 1/6/19 and extubated on 1/24/19; further c/b Enterococci bacteremia), p/w agitation x 3 weeks of unclear etiology.        Patient initially taken to [facility] for evaluation; she was evaluated by psych and deemed to have capacity to leave AMA. Patient w/ elevated WBC and SAM course c/b gram positive tru bacteremia sent to outside lab for speciation.         Patient admitted for further work up. Found to be significantly hypothyroid (), extremely combative and paranoid of family and healthcare staff. Determined by psych to not have capacity to make medical decisions. Agitation treated w/ Zyprexa IV Haldol as needed.s/p MRI brain and LP 2/6 under sedation, found to have small right sided SDH. course c/b hyperglycemia and qtc 500 in setting of Zyprexa use, which was d/michael; now w/ new change in mental status w/ RRT called on 2/11 for sedation and change in mental status, w/ repeat CT head w/o acute changes; CTA head and neck and CTV without any new changes; possibly metabolic from infection Tmax 100.5 (rectal) on 2/14.         # Encephalopathy of unclear etiology - thought to be autoimmune encephalitis 2/2 longstanding RA less likely as MRI brain neg for inflammatory changes and CSF autoimmune panel neg (west nile IgM, IgG, dsDNA, anti-RNP, anti-ANF, anti-smith Ab) neg, vs. from hypothyroidism less likely given correction of TSH/FT4 with minimal change in behavior vs. possible paraneoplastic syndrome unlikely as paraneoplastic panel negative vs. possibly from SDH vs other psychiatric or infectious etiology. CSF studies w/ 1 nucleated cell; gram stain w/ few PMNs, no orgs; CSF cultures NGTD; CSF PCR, cryptococcal antigen, HSV PCR, AFB smear, Borrelia ab, VDRL neg. NMDR-Ab CSF. New change in mental status, increasing sedation on 2/11 possibly 2/2 depakote sensitivity; depakote d/michael as son Norris asked to hold further depakote as it resulted in AMS in the past lasting a few days and requiring hospitalization, however seems highly unlikely as depkaote level <3. Depakote sensitivity from urea cycle problems ruled out, ammonia levels wnl. New change possibly metabolic encephalopathy possibly from UTI vs. other infection. Currently on risperidone 0.25 mg BID for agitation (held when sedated). EEG ordered.        #sepsis: meeting SIRS (tachycardia 110, Temp 100.5) on 2/14. Plan as above.         #hypothyroidism: patient initially on 200 mg on admission then transitioned to 175 mg (weight based regimen) then transitioned to 87.5 mg IV. repeat TFTs in 4-6 weeks post d/c, no need to repeat this admission.         # Abdominal wound at SBO revision site: wound care following; change dressing every 7 days; changed 2/6, 2/13; wound base clean w/o infection         #Diabetes mellitus:  with slight AG (18), BHB 4 (elevated) on presentation; hospital course complicated by FBGs in 300s. Lantus uptitrated to Humalog 34 and Humalog 9U TID. c/b hypoglycemia likely due to poor PO intake given altered mentation / sedation. Continued w/ lantus 20 as patient w/ lower PO intake due to AMS and mod ISS.     [ ] reintroduce pre-meal when patient eating         #HTN Plan: On met ER 50 at home switched to metoprolol 2.5 q6hrs for sedation         # Gram positive bacterial infection: Bcx from OSH  + for GPR on 1/12, 1 out of 2 bottles sent to [facility], no further speciation available. Clearance cx negative this hospitalization. Unclear if treated with abx at OSH.         #Arthritis: home prednisone 15 mg switched to methylprednisolone IV        #Pulmonary hypertension: Moderate pulmonary HTN per TTE (1/13). Per son, sildenafil was discontinued. Resp status stable.  64F w/ a PMHx arthritis, hypothyroidism, pulmonary HTN, DM on insulin, HTN, COPD/CHRIS on noctournal CPAP and 3L home), RA on chronic prednisone, chronic tremors, SBO s/p ex lap with lysis of adhesions c/b evisceration 2/2 a coughing episode s/p repeat ex lap (c/b multifocal pneumonia and hypercapnic respiratory failure requiring intubation 1/6/19 and extubated on 1/24/19; further c/b Enterococci bacteremia), p/w agitation x 3 weeks of unclear etiology.        Patient initially taken to [facility] for evaluation; she was evaluated by psych and deemed to have capacity to leave AMA. Patient w/ elevated WBC and SAM course c/b gram positive tru bacteremia sent to outside lab for speciation.         Patient admitted for further work up. Found to be significantly hypothyroid (), extremely combative and paranoid of family and healthcare staff. Determined by psych to not have capacity to make medical decisions. Agitation treated w/ Zyprexa IV Haldol as needed.s/p MRI brain and LP 2/6 under sedation, found to have small right sided SDH. course c/b hyperglycemia and qtc 500 in setting of Zyprexa use, which was d/michael; now w/ new change in mental status w/ RRT called on 2/11 for sedation and change in mental status, w/ repeat CT head w/o acute changes; CTA head and neck and CTV without any new changes; possibly metabolic from infection Tmax 100.5 (rectal) on 2/14.         Pt's encephalopathy of unclear etiology was thought to be autoimmune encephalitis 2/2 longstanding RA less likely as MRI brain neg for inflammatory changes and CSF autoimmune panel neg (west nile IgM, IgG, dsDNA, anti-RNP, anti-ANF, anti-smith Ab) neg, vs. from hypothyroidism less likely given correction of TSH/FT4 with minimal change in behavior vs. possible paraneoplastic syndrome unlikely as paraneoplastic panel negative vs. possibly from SDH vs other psychiatric or infectious etiology. CSF studies showed 1 nucleated cell; gram stain w/ few PMNs, no orgs; CSF cultures NGTD; CSF PCR, cryptococcal antigen, HSV PCR, AFB smear, Borrelia ab, VDRL neg. NMDR-Ab CSF. New change in mental status, increasing sedation on 2/11 possibly 2/2 depakote sensitivity; depakote d/michael as son Norris asked to hold further depakote as it resulted in AMS in the past lasting a few days and requiring hospitalization, however seems highly unlikely as depakote level <3. Depakote sensitivity from urea cycle problems ruled out, ammonia levels wnl. 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         For pt's hypothyroidism, she was initially on 200 mg on admission then transitioned to 175 mg (weight based regimen) then transitioned to 87.5 mg IV. Will need repeat TFTs in 4-6 weeks post d/c, no need to repeat this admission.         # Abdominal wound at SBO revision site: wound care following; change dressing every 7 days; changed 2/6, 2/13; wound base clean w/o infection         #Diabetes mellitus:  with slight AG (18), BHB 4 (elevated) on presentation; hospital course complicated by FBGs in 300s. Lantus uptitrated to Humalog 34 and Humalog 9U TID. c/b hypoglycemia likely due to poor PO intake given altered mentation / sedation. Continued w/ lantus 20 as patient w/ lower PO intake due to AMS and mod ISS. Pt had mild DKA this admission as she was refusing finger sticks, but recovered after she began to take her insulin.        # Gram positive bacterial infection: Bcx from OSH  + for GPR on 1/12, 1 out of 2 bottles sent to [facility], no further speciation available. Clearance cx negative this hospitalization. Unclear if treated with abx at OSH.         At time of discharge, pt is HD stable, tolerating PO intake, and at her mental baseline. As per psychiatry, her behavioral regimen has been optimized. 64F w/ a PMHx arthritis, hypothyroidism, pulmonary HTN, DM on insulin, HTN, COPD/CHRIS on noctournal CPAP and 3L home), RA on chronic prednisone, chronic tremors, SBO s/p ex lap with lysis of adhesions c/b evisceration 2/2 a coughing episode s/p repeat ex lap (c/b multifocal pneumonia and hypercapnic respiratory failure requiring intubation 1/6/19 and extubated on 1/24/19; further c/b Enterococci bacteremia), p/w agitation x 3 weeks of unclear etiology. Patient initially was taken to [facility] for evaluation; she was evaluated by psych and deemed to have capacity to leave AMA. Patient w/ elevated WBC and SAM course c/b gram positive tru bacteremia sent to outside lab for speciation.         Patient admitted to [facility] for further work up. Found to be significantly hypothyroid (), extremely combative and paranoid of family and healthcare staff. Determined by psych to not have capacity to make medical decisions. Agitation treated w/ Zyprexa IV Haldol as needed.s/p MRI brain and LP 2/6 under sedation, found to have small right sided SDH. course c/b hyperglycemia and qtc 500 in setting of Zyprexa use, which was d/michael; now w/ new change in mental status w/ RRT called on 2/11 for sedation and change in mental status, w/ repeat CT head w/o acute changes; CTA head and neck and CTV without any new changes; possibly metabolic from infection Tmax 100.5 (rectal) on 2/14.         Pt's encephalopathy of unclear etiology was thought to be autoimmune encephalitis 2/2 longstanding RA less likely as MRI brain neg for inflammatory changes and CSF autoimmune panel neg (west nile IgM, IgG, dsDNA, anti-RNP, anti-ANF, anti-smith Ab) neg, vs. from hypothyroidism less likely given correction of TSH/FT4 with minimal change in behavior vs. possible paraneoplastic syndrome unlikely as paraneoplastic panel negative vs. possibly from SDH vs other psychiatric or infectious etiology. CSF studies showed 1 nucleated cell; gram stain w/ few PMNs, no orgs; CSF cultures NGTD; CSF PCR, cryptococcal antigen, HSV PCR, AFB smear, Borrelia ab, VDRL neg. NMDR-Ab CSF. New change in mental status, increasing sedation on 2/11 possibly 2/2 depakote sensitivity; depakote d/michael as son Norris asked to hold further depakote as it resulted in AMS in the past lasting a few days and requiring hospitalization, however seems highly unlikely as depakote level <3. Depakote sensitivity from urea cycle problems ruled out, ammonia levels wnl. 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 She was switched to aripiprazole, and then to zyprexa 2.5 BID. As per psych, pt is now optimized on her current psychiatric regimen.        For pt's hypothyroidism, she was initially on 200 mg on admission then transitioned to 175 mg (weight based regimen) then transitioned to 87.5 mg IV. Will need repeat TFTs in 4-6 weeks post d/c, no need to repeat this admission. For pt's abdominal wound at SBO revision site, wound care was consulted and recommended dressing change every 7 days.         For pt's diabetes mellitus:  with slight AG (18), BHB 4 (elevated) on presentation; hospital course complicated by FBGs in 300s. Lantus uptitrated to Humalog 34 and Humalog 9U TID. c/b hypoglycemia likely due to poor PO intake given altered mentation / sedation. Continued w/ lantus 20 as patient w/ lower PO intake due to AMS and mod ISS. Pt had mild DKA this admission as she was refusing finger sticks, but recovered after she began to take her insulin.        Pt had blood cultures that grew gram positive bacteria from OSH  + for GPR on 1/12, 1 out of 2 bottles sent to [facility], no further speciation available. Clearance cx negative this hospitalization. Unclear if treated with abx at OSH.         At time of discharge, pt is HD stable, tolerating PO intake, and at her mental baseline. As per psychiatry, her behavioral regimen has been optimized. She will be discharged home with home health aides. 64F w/ a PMHx arthritis, hypothyroidism, pulmonary HTN, DM on insulin, HTN, COPD/CHRIS on noctournal CPAP and 3L home), RA on chronic prednisone, chronic tremors, SBO s/p ex lap with lysis of adhesions c/b evisceration 2/2 a coughing episode s/p repeat ex lap (c/b multifocal pneumonia and hypercapnic respiratory failure requiring intubation 1/6/19 and extubated on 1/24/19; further c/b Enterococci bacteremia), p/w agitation x 3 weeks of unclear etiology. Patient initially was taken to [facility] for evaluation; she was evaluated by psych and deemed to have capacity to leave AMA. Patient w/ elevated WBC and SAM course c/b gram positive tru bacteremia sent to outside lab for speciation.         Patient admitted to [facility] for further work up. Found to be significantly hypothyroid (), extremely combative and paranoid of family and healthcare staff. Determined by psych to not have capacity to make medical decisions. Agitation treated w/ Zyprexa IV Haldol as needed.s/p MRI brain and LP 2/6 under sedation, found to have small right sided SDH. course c/b hyperglycemia and qtc 500 in setting of Zyprexa use, which was d/michael; now w/ new change in mental status w/ RRT called on 2/11 for sedation and change in mental status, w/ repeat CT head w/o acute changes; CTA head and neck and CTV without any new changes; possibly metabolic from infection Tmax 100.5 (rectal) on 2/14.         Pt's encephalopathy of unclear etiology. Differential was as follows: was thought initially to be autoimmune encephalitis 2/2 longstanding RA less likely as MRI brain neg for inflammatory changes and CSF autoimmune panel neg (west nile IgM, IgG, dsDNA, anti-RNP, anti-ANF, anti-smith Ab) vs. from hypothyroidism less likely given correction of TSH/FT4 with minimal change in behavior vs. possible paraneoplastic syndrome unlikely as paraneoplastic panel negative vs. possibly from SDH vs other psychiatric or infectious etiology. CSF studies showed 1 nucleated cell; gram stain w/ few PMNs, no orgs; CSF cultures NGTD; CSF PCR, cryptococcal antigen, HSV PCR, AFB smear, Borrelia ab, VDRL neg. NMDR-Ab CSF. New change in mental status, increasing sedation on 2/11 possibly 2/2 depakote sensitivity; depakote d/michael as son Norris asked to hold further depakote as it resulted in AMS in the past lasting a few days and requiring hospitalization, however seems highly unlikely as depakote level <3. Depakote sensitivity from urea cycle problems ruled out, ammonia levels wnl. 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 She was switched to aripiprazole, and then to zyprexa 2.5 BID. As per psych, pt is now optimized on her current psychiatric regimen. Ultimately, encephalopathy was likely multifactorial from hypothyroidism, SDH, UTI and per psychiatry, likely establihed new baseline MS.        For pt's hypothyroidism, she was initially on 200 mg on admission then transitioned to 175 mg (weight based regimen) then transitioned to 87.5 mg IV. Will need repeat TFTs in 4-6 weeks post d/c, no need to repeat this admission. For pt's abdominal wound at SBO revision site, wound care was consulted and recommended dressing change every 7 days.         For pt's diabetes mellitus:  with slight AG (18), BHB 4 (elevated) on presentation; hospital course complicated by FBGs in 300s. Lantus uptitrated to Humalog 34 and Humalog 9U TID. c/b hypoglycemia likely due to poor PO intake given altered mentation / sedation. Continued w/ lantus 20 as patient w/ lower PO intake due to AMS and mod ISS. Pt had mild DKA this admission as she was refusing finger sticks, but recovered after she began to take her insulin.        Pt had blood cultures that grew gram positive bacteria from OSH  + for GPR on 1/12, 1 out of 2 bottles sent to [facility], no further speciation available. Clearance cx negative this hospitalization. Unclear if treated with abx at OSH.         At time of discharge, pt is HD stable, tolerating PO intake, and at her mental baseline. As per psychiatry, her behavioral regimen has been optimized. She will be discharged home with home health aides. 64F w/ a PMHx arthritis, hypothyroidism, pulmonary HTN, DM on insulin, HTN, COPD/CHRIS on noctournal CPAP and 3L home), RA on chronic prednisone, chronic tremors, SBO s/p ex lap with lysis of adhesions c/b evisceration 2/2 a coughing episode s/p repeat ex lap (c/b multifocal pneumonia and hypercapnic respiratory failure requiring intubation 1/6/19 and extubated on 1/24/19; further c/b Enterococci bacteremia), p/w agitation x 3 weeks of unclear etiology. Patient initially was taken to [facility] for evaluation; she was evaluated by psych and deemed to have capacity to leave AMA. Patient w/ elevated WBC and SAM course c/b gram positive tru bacteremia sent to outside lab for speciation.         Patient admitted to [facility] for further work up. Found to be significantly hypothyroid (), extremely combative and paranoid of family and healthcare staff. Determined by psych to not have capacity to make medical decisions. Agitation treated w/ Zyprexa IV Haldol as needed.s/p MRI brain and LP 2/6 under sedation, found to have small right sided SDH. course c/b hyperglycemia and qtc 500 in setting of Zyprexa use, which was d/michael; now w/ new change in mental status w/ RRT called on 2/11 for sedation and change in mental status, w/ repeat CT head w/o acute changes; CTA head and neck and CTV without any new changes; possibly metabolic from infection Tmax 100.5 (rectal) on 2/14.         Pt's encephalopathy of unclear etiology. Differential was as follows: was thought initially to be autoimmune encephalitis 2/2 longstanding RA less likely as MRI brain neg for inflammatory changes and CSF autoimmune panel neg (west nile IgM, IgG, dsDNA, anti-RNP, anti-ANF, anti-smith Ab) vs. from hypothyroidism less likely given correction of TSH/FT4 with minimal change in behavior vs. possible paraneoplastic syndrome unlikely as paraneoplastic panel negative vs. possibly from SDH vs other psychiatric or infectious etiology. CSF studies showed 1 nucleated cell; gram stain w/ few PMNs, no orgs; CSF cultures NGTD; CSF PCR, cryptococcal antigen, HSV PCR, AFB smear, Borrelia ab, VDRL neg. NMDR-Ab CSF. New change in mental status, increasing sedation on 2/11 possibly 2/2 depakote sensitivity; depakote d/michael as son Norris asked to hold further depakote as it resulted in AMS in the past lasting a few days and requiring hospitalization, however seems highly unlikely as depakote level <3. Depakote sensitivity from urea cycle problems ruled out, ammonia levels wnl. 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 She was switched to aripiprazole, and then to zyprexa 2.5 BID. As per psych, pt is now optimized on her current psychiatric regimen. Ultimately, encephalopathy was likely multifactorial from hypothyroidism, SDH, UTI and per psychiatry, likely establihed new baseline MS.        For pt's hypothyroidism, she was initially on 200 mg on admission then transitioned to 175 mg (weight based regimen) then transitioned to 87.5 mg IV. Will need repeat TFTs in 4-6 weeks post d/c, no need to repeat this admission. For pt's abdominal wound at SBO revision site, wound care was consulted and recommended dressing change every 7 days.         For pt's diabetes mellitus:  with slight AG (18), BHB 4 (elevated) on presentation; hospital course complicated by FBGs in 300s. Lantus uptitrated to Humalog 34 and Humalog 9U TID. c/b hypoglycemia likely due to poor PO intake given altered mentation / sedation. Continued w/ lantus 20 as patient w/ lower PO intake due to AMS and mod ISS. Pt had mild DKA this admission as she was refusing finger sticks, but recovered after she began to take her insulin.        Pt had blood cultures that grew gram positive bacteria from OSH  + for GPR on 1/12, 1 out of 2 bottles sent to [facility], no further speciation available. Clearance cx negative this hospitalization. Unclear if treated with abx at OSH.         At time of discharge, pt is HD stable, tolerating PO intake, and at her mental baseline. As per psychiatry, her behavioral regimen has been optimized. She will be discharged to [facility].

## 2020-02-02 NOTE — DISCHARGE NOTE PROVIDER - NSDCMRMEDTOKEN_GEN_ALL_CORE_FT
Advair Diskus 250 mcg-50 mcg inhalation powder: 1 puff(s) inhaled once a day  albuterol 90 mcg/inh inhalation aerosol: 2 puff(s) inhaled 4 times a day  gabapentin 400 mg oral capsule: 1 cap(s) orally 3 times a day  Januvia 50 mg oral tablet: 1 tab(s) orally once a day  Lantus Solostar Pen 100 units/mL subcutaneous solution: 20 unit(s) subcutaneous once a day (at bedtime)  levothyroxine 137 mcg (0.137 mg) oral tablet: 1 tab(s) orally once a day  metoprolol succinate 50 mg oral tablet, extended release: 1 tab(s) orally once a day  naproxen 250 mg oral tablet: 1 tab(s) orally 2 times a day, As Needed  NovoLOG FlexPen 100 units/mL injectable solution: 2-8 unit(s) injectable 3 times a day (with meals)   pantoprazole 40 mg oral delayed release tablet: 1 tab(s) orally once a day  predniSONE: 15 milligram(s) orally once a day  primidone 50 mg oral tablet: 1 tab(s) orally once a day (at bedtime)  sildenafil 20 mg oral tablet: 1 tab(s) orally 3 times a day  simvastatin 20 mg oral tablet: 1 tab(s) orally once a day (at bedtime)  traMADol 300 mg/24 hours oral tablet, extended release: 1 tab(s) orally once a day, As Needed Advair Diskus 250 mcg-50 mcg inhalation powder: 1 puff(s) inhaled once a day  albuterol 90 mcg/inh inhalation aerosol: 2 puff(s) inhaled 4 times a day  alcohol swabs : Apply topically to affected area 4 times a day   glucometer (per patient&#x27;s insurance): Test blood sugars four times a day. Dispense #1 glucometer.  Insulin Pen Needles, 4mm: 1 application subcutaneously 4 times a day. ** Use with insulin pen **   Januvia 50 mg oral tablet: 2 tab(s) orally once a day   lancets: 1 application subcutaneously 4 times a day   Lantus Solostar Pen 100 units/mL subcutaneous solution: 25 unit(s) subcutaneous once a day (in the morning)   levothyroxine 175 mcg (0.175 mg) oral tablet: 1 tab(s) orally once a day. Please take one hour before taking other pills and meals.   losartan 25 mg oral tablet: 1 tab(s) orally once a day  melatonin 3 mg oral tablet: 1 tab(s) orally once a day (at bedtime), As needed, Insomnia  metFORMIN 500 mg oral tablet: 1 tab(s) orally 2 times a day   metoprolol tartrate 25 mg oral tablet: 1 tab(s) orally 2 times a day  Multiple Vitamins with Minerals oral liquid:  orally   naproxen 250 mg oral tablet: 1 tab(s) orally 2 times a day, As Needed  nystatin 100,000 units/g topical powder: 1 application topically every 12 hours  OLANZapine 2.5 mg oral tablet: 1 tab(s) orally 2 times a day  pantoprazole 40 mg oral delayed release tablet: 1 tab(s) orally once a day   predniSONE 5 mg oral tablet: 3 tab(s) orally once a day  primidone 50 mg oral tablet: 1 tab(s) orally once a day (at bedtime)  simvastatin 20 mg oral tablet: 1 tab(s) orally once a day (at bedtime)  test strips (per patient&#x27;s insurance): 1 application subcutaneously 4 times a day. ** Compatible with patient&#x27;s glucometer **  thiamine 100 mg oral tablet: 1 tab(s) orally once a day  traMADol 50 mg oral tablet: 0.5 tab(s) orally every 8 hours MDD:1.5TABS Advair Diskus 250 mcg-50 mcg inhalation powder: 1 puff(s) inhaled once a day  albuterol 90 mcg/inh inhalation aerosol: 2 puff(s) inhaled 4 times a day  alcohol swabs : Apply topically to affected area 4 times a day   glucometer (per patient&#x27;s insurance): Test blood sugars four times a day. Dispense #1 glucometer.  HumaLOG 100 units/mL injectable solution: 10 unit(s) injectable once a day (in the evening)  before dinner   HumaLOG 100 units/mL injectable solution: 14 unit(s) injectable once a day (before a meal) before lunch  HumaLOG 100 units/mL injectable solution: 18 unit(s) injectable once a day (in the morning) beofre breakfast   HumaLOG 100 units/mL injectable solution: 2 Unit(s) if Glucose 151 - 200  4 Unit(s) if Glucose 201 - 250  6 Unit(s) if Glucose 251 - 300  8 Unit(s) if Glucose 301 - 350  10 Unit(s) if Glucose 351 - 400  12 Unit(s) if Glucose Greater Than 400  HumaLOG 100 units/mL injectable solution: 2 Unit(s) if Glucose 251 - 300  4 Unit(s) if Glucose 301 - 350  6 Unit(s) if Glucose 351 - 400  8 Unit(s) if Glucose GRAETER THAN 400    Insulin Pen Needles, 4mm: 1 application subcutaneously 4 times a day. ** Use with insulin pen **   lancets: 1 application subcutaneously 4 times a day   Lantus Solostar Pen 100 units/mL subcutaneous solution: 34 unit(s) subcutaneous once a day (in the morning)   levothyroxine 175 mcg (0.175 mg) oral tablet: 1 tab(s) orally once a day. Please take one hour before taking other pills and meals.   losartan 25 mg oral tablet: 1 tab(s) orally once a day  melatonin 3 mg oral tablet: 1 tab(s) orally once a day (at bedtime), As needed, Insomnia  metoprolol tartrate 25 mg oral tablet: 1 tab(s) orally 2 times a day  Multiple Vitamins with Minerals oral liquid:  orally   naproxen 250 mg oral tablet: 1 tab(s) orally 2 times a day, As Needed  nystatin 100,000 units/g topical powder: 1 application topically every 12 hours  OLANZapine 2.5 mg oral tablet: 1 tab(s) orally 2 times a day  pantoprazole 40 mg oral delayed release tablet: 1 tab(s) orally once a day   predniSONE 5 mg oral tablet: 3 tab(s) orally once a day  primidone 50 mg oral tablet: 1 tab(s) orally once a day (at bedtime)  simvastatin 20 mg oral tablet: 1 tab(s) orally once a day (at bedtime)  test strips (per patient&#x27;s insurance): 1 application subcutaneously 4 times a day. ** Compatible with patient&#x27;s glucometer **  thiamine 100 mg oral tablet: 1 tab(s) orally once a day  traMADol 50 mg oral tablet: 0.5 tab(s) orally every 8 hours MDD:1.5TABS Advair Diskus 250 mcg-50 mcg inhalation powder: 1 puff(s) inhaled once a day  albuterol 90 mcg/inh inhalation aerosol: 2 puff(s) inhaled 4 times a day  alcohol swabs : Apply topically to affected area 4 times a day   glucometer (per patient&#x27;s insurance): Test blood sugars four times a day. Dispense #1 glucometer.  HumaLOG 100 units/mL injectable solution: 10 unit(s) injectable once a day (in the evening)  before dinner   HumaLOG 100 units/mL injectable solution: 14 unit(s) injectable once a day (before a meal) before lunch  HumaLOG 100 units/mL injectable solution: 18 unit(s) injectable once a day (in the morning) beofre breakfast   Insulin Pen Needles, 4mm: 1 application subcutaneously 4 times a day. ** Use with insulin pen **   lancets: 1 application subcutaneously 4 times a day   Lantus Solostar Pen 100 units/mL subcutaneous solution: 34 unit(s) subcutaneous once a day (in the morning)   levothyroxine 175 mcg (0.175 mg) oral tablet: 1 tab(s) orally once a day. Please take one hour before taking other pills and meals.   losartan 25 mg oral tablet: 1 tab(s) orally once a day  melatonin 3 mg oral tablet: 1 tab(s) orally once a day (at bedtime), As needed, Insomnia  metoprolol tartrate 25 mg oral tablet: 1 tab(s) orally 2 times a day  Multiple Vitamins with Minerals oral liquid:  orally   naproxen 250 mg oral tablet: 1 tab(s) orally 2 times a day, As Needed  nystatin 100,000 units/g topical powder: 1 application topically every 12 hours  OLANZapine 2.5 mg oral tablet: 1 tab(s) orally 2 times a day  pantoprazole 40 mg oral delayed release tablet: 1 tab(s) orally once a day   predniSONE 5 mg oral tablet: 3 tab(s) orally once a day  primidone 50 mg oral tablet: 1 tab(s) orally once a day (at bedtime)  simvastatin 20 mg oral tablet: 1 tab(s) orally once a day (at bedtime)  test strips (per patient&#x27;s insurance): 1 application subcutaneously 4 times a day. ** Compatible with patient&#x27;s glucometer **  thiamine 100 mg oral tablet: 1 tab(s) orally once a day  traMADol 50 mg oral tablet: 0.5 tab(s) orally every 8 hours MDD:1.5TABS

## 2020-02-02 NOTE — PROGRESS NOTE ADULT - PROBLEM SELECTOR PLAN 2
Hgb a1c of 9.2. Uncontrolled due to insulin nonadherence.      -continue lantus 30 units qAM   - and continue humalog 9 units tidac   -no pattern to hyperglycemia other than she eats prior to fingersticks being taken or refuses her insulin. Difficult to give insulin as patient is aggressive with the nurses and would spit at them.   -continue  sliding scale to moderate dose sliding scale tidac and qhs      Discharge  basal/bolus TBD

## 2020-02-02 NOTE — DISCHARGE NOTE PROVIDER - NSDCFUADDAPPT_GEN_ALL_CORE_FT
Please follow up with Dr. Nails, your primary care physician, within 1 week of discharge from the hospital.

## 2020-02-02 NOTE — DISCHARGE NOTE PROVIDER - NSDCCPCAREPLAN_GEN_ALL_CORE_FT
PRINCIPAL DISCHARGE DIAGNOSIS  Diagnosis: Encephalopathy  Assessment and Plan of Treatment: You were admitted to the hospital because of a change in your behavior. You were seen by Pyschiatry who deemed that because of your change in behavior, you should not make decisions for yourself. Your behavior was initially though to be due to your thyroid because your thyroid function was very low. You were given medication to help your thyroid function, and while it improved, your behavior did not. You underwent MRI and LP for better characterization.      SECONDARY DISCHARGE DIAGNOSES  Diagnosis: Hypothyroid  Assessment and Plan of Treatment: You were admitted because of a change in your behavior. You were noted to have severely low thyroid function. Endocrinology was consulted and you were started on a thyroid medication regimen which helped to improve your thyroid levels. You are advised to follow-up with your endocrinologist.    Diagnosis: Type 2 diabetes mellitus with hyperglycemia, with long-term current use of insulin  Assessment and Plan of Treatment: You were admitted because of a change in your behavior. On admission, you were noted to have elevated glucose and slight acid buildup. Endocrinology was consulted and they recommended an insulin regimen to help control your blood sugar.    Diagnosis: Wound  Assessment and Plan of Treatment: You were admitted because of a change in your behavior. You have an abdominal wound from a previous surgery. Wound care was consulted to ensure that your abdominal wound was properly cleaned and dressed to prevent infection. PRINCIPAL DISCHARGE DIAGNOSIS  Diagnosis: Encephalopathy  Assessment and Plan of Treatment: You were admitted to the hospital because of a change in your behavior. You were seen by Pyschiatry who deemed that because of your change in behavior, you should not make decisions for yourself. Your behavior was initially though to be due to your thyroid because your thyroid function was very low. You were given medication to help your thyroid function, and while it improved, your behavior did not. You underwent MRI and LP for better characterization. Your medication regimen was optimized by psychiatry.      SECONDARY DISCHARGE DIAGNOSES  Diagnosis: Hypothyroid  Assessment and Plan of Treatment: You were admitted because of a change in your behavior. You were noted to have severely low thyroid function. Endocrinology was consulted and you were started on a thyroid medication regimen which helped to improve your thyroid levels. You are advised to follow-up with your endocrinologist.    Diagnosis: Type 2 diabetes mellitus with hyperglycemia, with long-term current use of insulin  Assessment and Plan of Treatment: You were admitted because of a change in your behavior. On admission, you were noted to have elevated glucose and slight acid buildup. Endocrinology was consulted and they recommended an insulin regimen to help control your blood sugar.    Diagnosis: Wound  Assessment and Plan of Treatment: You were admitted because of a change in your behavior. You have an abdominal wound from a previous surgery. Wound care was consulted to ensure that your abdominal wound was properly cleaned and dressed to prevent infection. PRINCIPAL DISCHARGE DIAGNOSIS  Diagnosis: Encephalopathy  Assessment and Plan of Treatment: You were admitted to the hospital because of a change in your behavior. You were seen by Pyschiatry who deemed that because of your change in behavior, you should not make decisions for yourself. Your behavior was initially though to be due to your thyroid because your thyroid function was very low. You were given medication to help your thyroid function, and while it improved, your behavior did not. You underwent MRI and LP for better characterization. Your medication regimen was optimized by psychiatry.      SECONDARY DISCHARGE DIAGNOSES  Diagnosis: Hypothyroid  Assessment and Plan of Treatment: You were admitted because of a change in your behavior. You were noted to have severely low thyroid function. Endocrinology was consulted and you were started on a thyroid medication regimen which helped to improve your thyroid levels. You are advised to follow-up with your endocrinologist.    Diagnosis: Type 2 diabetes mellitus with hyperglycemia, with long-term current use of insulin  Assessment and Plan of Treatment: You were admitted because of a change in your behavior. On admission, you were noted to have elevated glucose and slight acid buildup. Endocrinology was consulted and they recommended an insulin regimen to help control your blood sugar. You will be discharged on a regimen to control your blood sugar levels at home.    Diagnosis: Wound  Assessment and Plan of Treatment: You were admitted because of a change in your behavior. You have an abdominal wound from a previous surgery. Wound care was consulted to ensure that your abdominal wound was properly cleaned and dressed to prevent infection. You will continue to receive care for your wound at home.

## 2020-02-02 NOTE — PROGRESS NOTE ADULT - SUBJECTIVE AND OBJECTIVE BOX
Contact Information:  Lala Reddy II, MD, MPH  PGY-1, Internal Medicine  Pager: [phone] ([facility]) /// [phone] ([facility])    AFTAB BASS, MRN-[id number]    Patient is a 64y old  Female who presents with a chief complaint of change in mental status (01 Feb 2020 08:06)      OVERNIGHT EVENTS:    SUBJECTIVE:    CONSTITUTIONAL: No weakness. No fatigue. No fever.  HEAD: No head trauma.   EYES: No vision changes.  ENT: No hearing changes or tinnitus. No ear pain. No changes in smell. No nasal congestion or discharge. No sore throat. No voice hoarseness.   NECK: No neck pain or stiffness. No lumps.  RESPIRATORY: No cough. No SOB. No wheezing. No hemoptysis.   CARDIOVASCULAR: No chest pain. No palpitations.   GASTROINTESTINAL: No dysphagia. No ABD pain. No distension. No constipation. No diarrhea. No pain with defecation. No hematemesis. No hematochezia or melena.  BACK: No back pain.  GENITOURINARY: No dysuria. No frequency or urgency. No hesitancy. No incontinence. No urinary retention. No suprapubic pain. No hematuria.  EXTREMITY: No swelling.  MUSCULOSKELETAL: No joint pain or swelling. No fractures. No stiffness.    SKIN: No rashes. No itching. No skin, hair, or nail changes.  NEUROLOGICAL: No weakness or paralysis. No lightheadedness or dizziness. No HA. No numbness or tingling.   PSYCHIATRIC: No depression.       OBJECTIVE:  Vital Signs Last 24 Hrs  T(C): 36.4 (02 Feb 2020 06:33), Max: 36.6 (01 Feb 2020 07:41)  T(F): 97.5 (02 Feb 2020 06:33), Max: 97.8 (01 Feb 2020 07:41)  HR: 85 (02 Feb 2020 06:33) (79 - 88)  BP: 138/77 (02 Feb 2020 06:33) (129/79 - 138/77)  BP(mean): --  RR: 16 (02 Feb 2020 06:33) (16 - 18)  SpO2: 100% (02 Feb 2020 06:33) (98% - 100%)  I&O's Summary      MEDICATIONS  (STANDING):  budesonide  80 MICROgram(s)/formoterol 4.5 MICROgram(s) Inhaler 2 Puff(s) Inhalation two times a day  dextrose 5%. 1000 milliLiter(s) (50 mL/Hr) IV Continuous <Continuous>  dextrose 50% Injectable 12.5 Gram(s) IV Push once  dextrose 50% Injectable 25 Gram(s) IV Push once  dextrose 50% Injectable 25 Gram(s) IV Push once  enoxaparin Injectable 40 milliGRAM(s) SubCutaneous daily  gabapentin 400 milliGRAM(s) Oral three times a day  influenza   Vaccine 0.5 milliLiter(s) IntraMuscular once  insulin glargine Injectable (LANTUS) 30 Unit(s) SubCutaneous every morning  insulin lispro (HumaLOG) corrective regimen sliding scale   SubCutaneous three times a day before meals  insulin lispro Injectable (HumaLOG) 9 Unit(s) SubCutaneous three times a day before meals  levothyroxine 200 MICROGram(s) Oral <User Schedule>  metoprolol succinate ER 50 milliGRAM(s) Oral daily  OLANZapine 2.5 milliGRAM(s) Oral two times a day  pantoprazole    Tablet 40 milliGRAM(s) Oral before breakfast  predniSONE   Tablet 15 milliGRAM(s) Oral daily  primidone 50 milliGRAM(s) Oral at bedtime  simvastatin 20 milliGRAM(s) Oral at bedtime    MEDICATIONS  (PRN):  acetaminophen   Tablet .. 650 milliGRAM(s) Oral every 6 hours PRN Temp greater or equal to 38C (100.4F), Mild Pain (1 - 3), Moderate Pain (4 - 6)  albuterol/ipratropium for Nebulization 3 milliLiter(s) Nebulizer every 6 hours PRN Shortness of Breath and/or Wheezing  dextrose 40% Gel 15 Gram(s) Oral once PRN Blood Glucose LESS THAN 70 milliGRAM(s)/deciLiter  glucagon  Injectable 1 milliGRAM(s) IntraMuscular once PRN Glucose <70 milliGRAM(s)/deciLiter  haloperidol    Injectable 2 milliGRAM(s) IntraMuscular every 4 hours PRN Agitation  haloperidol    Injectable 2 milliGRAM(s) IV Push every 4 hours PRN Agitation  melatonin 3 milliGRAM(s) Oral at bedtime PRN Insomnia  naproxen 250 milliGRAM(s) Oral every 6 hours PRN Moderate Pain (4 - 6)  OLANZapine Injectable 2.5 milliGRAM(s) IntraMuscular every 4 hours PRN agitation  traMADol 100 milliGRAM(s) Oral three times a day PRN Severe Pain (7 - 10)    Allergies    No Known Allergies    Intolerances    Seroquel (Other)      CONSTITUTIONAL: No acute distress. Awake and alert.  HEAD: No evidence of trauma. Structures WNL.  EYES: +PERRL. +EOMI. No scleral icterus. No conjunctival injection.  ENT: Moist oral mucosa. No erythema. No pharyngeal exudates.   NECK: Supple. Appropriate ROM. No stiffness. No masses or lymphadenopathy.  RESPIRATORY: CTAB. No wheezes, rales, or rhonchi. No accessory muscle use. No apparent respiratory distress.  CARDIOVASCULAR: +S1/S2. No audible S3/S4. Regular rate and rhythm. No murmurs, rubs, or gallops. 2+ radial pulses x b/l UE; 2+ DP pulses x b/l LE.   GASTROINTESTINAL: Soft, nontender, nondistended. +BS. No rebound or guarding.   BACK: No spinal or paraspinal tenderness. No CVA tenderness.  EXTREMITY: No LE swelling or edema. EXTs warm to touch.  MUSCULOSKELETAL: Movement evident in all limbs. No tenderness on palpation.  DERMATOLOGICAL: No abnormal rashes or lesions.  NEUROLOGICAL: CN 2-12 grossly intact. No focal deficits. Sensation intact x 4EXT. A&Ox3 (oriented to person, place, and time).  PSYCHIATRIC: Appropriate affect.                            9.1    10.58 )-----------( 410      ( 01 Feb 2020 20:25 )             29.2       02-01    139  |  106  |  25<H>  ----------------------------<  316<H>  5.3   |  20<L>  |  1.09    Ca    9.1      01 Feb 2020 20:25  Phos  3.0     02-01  Mg     2.0     02-01    TPro  6.5  /  Alb  3.7  /  TBili  <0.1<L>  /  DBili  x   /  AST  19  /  ALT  20  /  AlkPhos  85  02-01    CAPILLARY BLOOD GLUCOSE      POCT Blood Glucose.: 264 mg/dL (01 Feb 2020 12:46)  POCT Blood Glucose.: 122 mg/dL (01 Feb 2020 09:06)    LIVER FUNCTIONS - ( 01 Feb 2020 20:25 )  Alb: 3.7 g/dL / Pro: 6.5 g/dL / ALK PHOS: 85 U/L / ALT: 20 U/L / AST: 19 U/L / GGT: x                       RADIOLOGY AND ADDITIONAL TESTS:    CONSULTANT NOTES REVIEWED:    CARE DISCUSSED WITH THE FOLLOWING CONSULTANTS/PROVIDERS: Contact Information:  Lala Reddy II, MD, MPH  PGY-1, Internal Medicine  Pager: [phone] ([facility]) /// [phone] ([facility])    AFTAB BASS, MRN-[id number]    Patient is a 64y old  Female who presents with a chief complaint of change in mental status (01 Feb 2020 08:06)      OVERNIGHT EVENTS: No overnight events.    SUBJECTIVE: Patient evaluated at bedside, denies any acute complaints. She states that she is signing herself out of the hospital.    ROS negative.      OBJECTIVE:  Vital Signs Last 24 Hrs  T(C): 36.4 (02 Feb 2020 06:33), Max: 36.6 (01 Feb 2020 07:41)  T(F): 97.5 (02 Feb 2020 06:33), Max: 97.8 (01 Feb 2020 07:41)  HR: 85 (02 Feb 2020 06:33) (79 - 88)  BP: 138/77 (02 Feb 2020 06:33) (129/79 - 138/77)  BP(mean): --  RR: 16 (02 Feb 2020 06:33) (16 - 18)  SpO2: 100% (02 Feb 2020 06:33) (98% - 100%)  I&O's Summary      MEDICATIONS  (STANDING):  budesonide  80 MICROgram(s)/formoterol 4.5 MICROgram(s) Inhaler 2 Puff(s) Inhalation two times a day  dextrose 5%. 1000 milliLiter(s) (50 mL/Hr) IV Continuous <Continuous>  dextrose 50% Injectable 12.5 Gram(s) IV Push once  dextrose 50% Injectable 25 Gram(s) IV Push once  dextrose 50% Injectable 25 Gram(s) IV Push once  enoxaparin Injectable 40 milliGRAM(s) SubCutaneous daily  gabapentin 400 milliGRAM(s) Oral three times a day  influenza   Vaccine 0.5 milliLiter(s) IntraMuscular once  insulin glargine Injectable (LANTUS) 30 Unit(s) SubCutaneous every morning  insulin lispro (HumaLOG) corrective regimen sliding scale   SubCutaneous three times a day before meals  insulin lispro Injectable (HumaLOG) 9 Unit(s) SubCutaneous three times a day before meals  levothyroxine 200 MICROGram(s) Oral <User Schedule>  metoprolol succinate ER 50 milliGRAM(s) Oral daily  OLANZapine 2.5 milliGRAM(s) Oral two times a day  pantoprazole    Tablet 40 milliGRAM(s) Oral before breakfast  predniSONE   Tablet 15 milliGRAM(s) Oral daily  primidone 50 milliGRAM(s) Oral at bedtime  simvastatin 20 milliGRAM(s) Oral at bedtime    MEDICATIONS  (PRN):  acetaminophen   Tablet .. 650 milliGRAM(s) Oral every 6 hours PRN Temp greater or equal to 38C (100.4F), Mild Pain (1 - 3), Moderate Pain (4 - 6)  albuterol/ipratropium for Nebulization 3 milliLiter(s) Nebulizer every 6 hours PRN Shortness of Breath and/or Wheezing  dextrose 40% Gel 15 Gram(s) Oral once PRN Blood Glucose LESS THAN 70 milliGRAM(s)/deciLiter  glucagon  Injectable 1 milliGRAM(s) IntraMuscular once PRN Glucose <70 milliGRAM(s)/deciLiter  haloperidol    Injectable 2 milliGRAM(s) IntraMuscular every 4 hours PRN Agitation  haloperidol    Injectable 2 milliGRAM(s) IV Push every 4 hours PRN Agitation  melatonin 3 milliGRAM(s) Oral at bedtime PRN Insomnia  naproxen 250 milliGRAM(s) Oral every 6 hours PRN Moderate Pain (4 - 6)  OLANZapine Injectable 2.5 milliGRAM(s) IntraMuscular every 4 hours PRN agitation  traMADol 100 milliGRAM(s) Oral three times a day PRN Severe Pain (7 - 10)    Allergies    No Known Allergies    Intolerances    Seroquel (Other)      CONSTITUTIONAL: Awake, no acute distress.  RESPIRATORY: Unable to assess due to patient refusal. On NC 3LPM.  CARDIOVASCULAR: Unable to assess due to patient refusal.  GASTROINTESTINAL: +ABD dressing due to evisceration of wound.  BACK: No spinal or paraspinal tenderness. No CVA tenderness.  EXTREMITY: No LE swelling or edema. EXTs warm to touch.  MUSCULOSKELETAL: Unable to assess due to patient refusal.  DERMATOLOGICAL: Chronic LE skin changes. UE ecchymoses. IV in LLE.  NEUROLOGICAL: Global extremity deficits.  PSYCHIATRIC: Combative.                            9.1    10.58 )-----------( 410      ( 01 Feb 2020 20:25 )             29.2       02-01    139  |  106  |  25<H>  ----------------------------<  316<H>  5.3   |  20<L>  |  1.09    Ca    9.1      01 Feb 2020 20:25  Phos  3.0     02-01  Mg     2.0     02-01    TPro  6.5  /  Alb  3.7  /  TBili  <0.1<L>  /  DBili  x   /  AST  19  /  ALT  20  /  AlkPhos  85  02-01    CAPILLARY BLOOD GLUCOSE      POCT Blood Glucose.: 264 mg/dL (01 Feb 2020 12:46)  POCT Blood Glucose.: 122 mg/dL (01 Feb 2020 09:06)    LIVER FUNCTIONS - ( 01 Feb 2020 20:25 )  Alb: 3.7 g/dL / Pro: 6.5 g/dL / ALK PHOS: 85 U/L / ALT: 20 U/L / AST: 19 U/L / GGT: x                       RADIOLOGY AND ADDITIONAL TESTS:    CONSULTANT NOTES REVIEWED:    CARE DISCUSSED WITH THE FOLLOWING CONSULTANTS/PROVIDERS:

## 2020-02-03 LAB
GLUCOSE BLDC GLUCOMTR-MCNC: 147 MG/DL — HIGH (ref 70–99)
GLUCOSE BLDC GLUCOMTR-MCNC: 247 MG/DL — HIGH (ref 70–99)
GLUCOSE BLDC GLUCOMTR-MCNC: 270 MG/DL — HIGH (ref 70–99)
GLUCOSE BLDC GLUCOMTR-MCNC: 381 MG/DL — HIGH (ref 70–99)

## 2020-02-03 PROCEDURE — 99232 SBSQ HOSP IP/OBS MODERATE 35: CPT | Mod: GC

## 2020-02-03 PROCEDURE — 99233 SBSQ HOSP IP/OBS HIGH 50: CPT | Mod: GC

## 2020-02-03 RX ORDER — LEVOTHYROXINE SODIUM 125 MCG
175 TABLET ORAL DAILY
Refills: 0 | Status: DISCONTINUED | OUTPATIENT
Start: 2020-02-04 | End: 2020-02-12

## 2020-02-03 RX ORDER — INSULIN LISPRO 100/ML
VIAL (ML) SUBCUTANEOUS
Refills: 0 | Status: DISCONTINUED | OUTPATIENT
Start: 2020-02-03 | End: 2020-02-05

## 2020-02-03 RX ORDER — INSULIN LISPRO 100/ML
VIAL (ML) SUBCUTANEOUS AT BEDTIME
Refills: 0 | Status: DISCONTINUED | OUTPATIENT
Start: 2020-02-03 | End: 2020-02-04

## 2020-02-03 RX ADMIN — OLANZAPINE 2.5 MILLIGRAM(S): 15 TABLET, FILM COATED ORAL at 18:11

## 2020-02-03 RX ADMIN — Medication 15 MILLIGRAM(S): at 09:12

## 2020-02-03 RX ADMIN — GABAPENTIN 400 MILLIGRAM(S): 400 CAPSULE ORAL at 16:35

## 2020-02-03 RX ADMIN — TRAMADOL HYDROCHLORIDE 100 MILLIGRAM(S): 50 TABLET ORAL at 13:40

## 2020-02-03 RX ADMIN — Medication 9 UNIT(S): at 18:10

## 2020-02-03 RX ADMIN — Medication 250 MILLIGRAM(S): at 09:47

## 2020-02-03 RX ADMIN — TRAMADOL HYDROCHLORIDE 100 MILLIGRAM(S): 50 TABLET ORAL at 14:00

## 2020-02-03 RX ADMIN — Medication 250 MILLIGRAM(S): at 16:36

## 2020-02-03 RX ADMIN — GABAPENTIN 400 MILLIGRAM(S): 400 CAPSULE ORAL at 23:11

## 2020-02-03 RX ADMIN — INSULIN GLARGINE 30 UNIT(S): 100 INJECTION, SOLUTION SUBCUTANEOUS at 09:12

## 2020-02-03 RX ADMIN — OLANZAPINE 2.5 MILLIGRAM(S): 15 TABLET, FILM COATED ORAL at 09:08

## 2020-02-03 RX ADMIN — BUDESONIDE AND FORMOTEROL FUMARATE DIHYDRATE 2 PUFF(S): 160; 4.5 AEROSOL RESPIRATORY (INHALATION) at 18:30

## 2020-02-03 RX ADMIN — TRAMADOL HYDROCHLORIDE 100 MILLIGRAM(S): 50 TABLET ORAL at 19:16

## 2020-02-03 RX ADMIN — Medication 200 MICROGRAM(S): at 09:11

## 2020-02-03 RX ADMIN — Medication 9 UNIT(S): at 09:18

## 2020-02-03 RX ADMIN — GABAPENTIN 400 MILLIGRAM(S): 400 CAPSULE ORAL at 09:11

## 2020-02-03 RX ADMIN — Medication 250 MILLIGRAM(S): at 09:11

## 2020-02-03 RX ADMIN — Medication 250 MILLIGRAM(S): at 17:06

## 2020-02-03 RX ADMIN — PANTOPRAZOLE SODIUM 40 MILLIGRAM(S): 20 TABLET, DELAYED RELEASE ORAL at 09:12

## 2020-02-03 RX ADMIN — PRIMIDONE 50 MILLIGRAM(S): 250 TABLET ORAL at 23:10

## 2020-02-03 RX ADMIN — TRAMADOL HYDROCHLORIDE 100 MILLIGRAM(S): 50 TABLET ORAL at 18:38

## 2020-02-03 RX ADMIN — Medication 1: at 22:45

## 2020-02-03 RX ADMIN — Medication 10: at 18:11

## 2020-02-03 RX ADMIN — Medication 50 MILLIGRAM(S): at 09:11

## 2020-02-03 RX ADMIN — BUDESONIDE AND FORMOTEROL FUMARATE DIHYDRATE 2 PUFF(S): 160; 4.5 AEROSOL RESPIRATORY (INHALATION) at 05:35

## 2020-02-03 RX ADMIN — SIMVASTATIN 20 MILLIGRAM(S): 20 TABLET, FILM COATED ORAL at 23:10

## 2020-02-03 NOTE — PROGRESS NOTE ADULT - ASSESSMENT
64 year old female with history of functional paraplegia, hypothyroidism, pulmonary hypertension, COPD, RA on chronic prednisone, SBO s/p ex lap with lysis of adhesions c/b multifocal pneumonia consulted for hypothyroidism and T2DM Management in the setting of lack of capacity due to mental status changes of unclear etiology.

## 2020-02-03 NOTE — PROGRESS NOTE ADULT - PROBLEM SELECTOR PLAN 1
- Unclear etiology though most likely organic; Ddx: Autoimmune encephalitis 2/2 longstanding RA vs Endocrinopathy given abnormal thyroid dysfunction though less likley given correction of TSH/FT4 with minimal change in behavior vs other psychiatric vs infectious  - LP and MR imaging studies pending  - ethics f/u concerning above procedures  - LP will likely require IR and MRI will likely require Anesthesia  - Per Psych, Zyprexa 2.5mg BID standing with 2.5 mg PRN agitation + Haldol 2mg IV or IM Q4H for agitation; EKG to assess for QTc - Unclear etiology though most likely organic; Ddx: Autoimmune encephalitis 2/2 longstanding RA vs Endocrinopathy given abnormal thyroid dysfunction though less likley given correction of TSH/FT4 with minimal change in behavior vs other psychiatric vs infectious etiology  - LP and MR imaging studies pending ethics re-eval   - ethics f/u concerning above procedures  - LP will likely require IR and MRI will likely require Anesthesia  - Per Psych, Zyprexa 2.5mg BID standing with 2.5 mg PRN agitation + Haldol 2mg IV or IM Q4H for agitation; EKG to assess for QTc

## 2020-02-03 NOTE — PROGRESS NOTE ADULT - ATTENDING COMMENTS
Ethics with no hesitation towards MRI/ LP with anesthesia. Will arrange with anesthesia tomorrow. Daughter and son updated today. Psych following.

## 2020-02-03 NOTE — PROGRESS NOTE ADULT - PROBLEM SELECTOR PLAN 1
-Her TSH levels have not been consistent which may be due to assay interference:  1/22 TSH 29  1/25   1/27 TSH 52.3  2/3  TSH 8.35  Based on her weight she should be on about 170mcg po qdaily. As a result, I would  decrease her dose to 175mcg po qdaily and recheck her TFTs in 4-6 weeks. Please give an hour before other meds or food.  -Now that her TSH is close to goal, I don't think that it explains her behavior, agree with w/u including MRI and LP.

## 2020-02-03 NOTE — PROGRESS NOTE ADULT - ASSESSMENT
64f hx arthritis, hypothyroidism, pulmonary HTN, DM on insulin, COPD/CHRIS on noctournal CPAP and 3L home), RA on chronic prednisone, chronic tremors, SBO s/p ex lap with lysis of adhesions (4 retention sutures) c/b eviscerated 2/2 a coughing episode s/p ex lap (6 retention sutures) c/b multifocal pneumonia, developed hypercapnic respiratory failure (requiring intubation 1/6/19, s/p bronch 1/9/19, and extubated on 1/24/19, course further c/b Enterococci bacteremia), p/w agitation 3 weeks of unclear etiology, found to be significantly hypothyroid (), extremely combative and paranoid of family and healthcare staff. 64F w/ a PMHx arthritis, hypothyroidism, pulmonary HTN, DM on insulin, COPD/CHRIS on noctournal CPAP and 3L home), RA on chronic prednisone, chronic tremors, SBO s/p ex lap with lysis of adhesions (4 retention sutures) c/b eviscerated 2/2 a coughing episode s/p ex lap c/b multifocal pneumonia, developed hypercapnic respiratory failure requiring intubation 1/6/19, s/p bronch 1/9/19, and extubated on 1/24/19, course further c/b Enterococci bacteremia), p/w agitation 3 weeks of unclear etiology, found to be significantly hypothyroid (), extremely combative and paranoid of family and healthcare staff. 64F w/ a PMHx arthritis, hypothyroidism, pulmonary HTN, DM on insulin, COPD/CHRIS on noctournal CPAP and 3L home), RA on chronic prednisone, chronic tremors, SBO s/p ex lap with lysis of adhesions c/b evisceration 2/2 a coughing episode s/p repeat ex lap c/b multifocal pneumonia and hypercapnic respiratory failure requiring intubation 1/6/19, s/p bronch 1/9/19, and extubated on 1/24/19; further c/b Enterococci bacteremia), p/w agitation 3 weeks of unclear etiology, found to be significantly hypothyroid (), extremely combative and paranoid of family and healthcare staff.

## 2020-02-03 NOTE — PROGRESS NOTE ADULT - PROBLEM SELECTOR PLAN 3
-  on admission; most recent TSH 8.35 2/1  - Continue with synthroid 200 mg PO  - Endocrinology following -  on admission; most recent TSH 8.35 2/1  - switch to synthroid 175 mg PO from 200 as per Endocrine, more proper weight based regimen   - Endocrinology following

## 2020-02-03 NOTE — PROGRESS NOTE ADULT - PROBLEM SELECTOR PLAN 2
- Per Ethics, given that treatment is in the best interest of patient, it is permissible to use chemical and physical restraint to provide such treatment (see Ethics note)  - Psych has already declared that patient lacks capacity to refuse medications or life-preserving interventions and ana maría Pisano (HCP) is in agreeance. - Per Ethics, given that treatment is in the best interest of patient, it is permissible to use chemical and physical restraint to provide such treatment   - Ethics of MRI and LP pending ethics re-eval   - Psych has already declared that patient lacks capacity to refuse medications or life-preserving interventions and ana maría Pisano (HCP) is in agreement w/ plan

## 2020-02-03 NOTE — PROGRESS NOTE ADULT - SUBJECTIVE AND OBJECTIVE BOX
***************************************************************  Dr. Kellie Bernal  Internal Medicine   [facility] Pager: [phone]  [facility] Pager: [phone]   ***************************************************************    AFTAB BASS  64y  MRN: [id number]    Subjective:    Patient is a 64y old  Female who presents with a chief complaint of change in mental status (02 Feb 2020 14:16)      Interval history/overnight events:    Refusing meds, agitated yesterday. No other acute events ON.      MEDICATIONS  (STANDING):  budesonide  80 MICROgram(s)/formoterol 4.5 MICROgram(s) Inhaler 2 Puff(s) Inhalation two times a day  dextrose 5%. 1000 milliLiter(s) (50 mL/Hr) IV Continuous <Continuous>  dextrose 50% Injectable 12.5 Gram(s) IV Push once  dextrose 50% Injectable 25 Gram(s) IV Push once  dextrose 50% Injectable 25 Gram(s) IV Push once  enoxaparin Injectable 40 milliGRAM(s) SubCutaneous daily  gabapentin 400 milliGRAM(s) Oral three times a day  influenza   Vaccine 0.5 milliLiter(s) IntraMuscular once  insulin glargine Injectable (LANTUS) 30 Unit(s) SubCutaneous every morning  insulin lispro (HumaLOG) corrective regimen sliding scale   SubCutaneous three times a day before meals  insulin lispro Injectable (HumaLOG) 9 Unit(s) SubCutaneous three times a day before meals  levothyroxine 200 MICROGram(s) Oral <User Schedule>  metoprolol succinate ER 50 milliGRAM(s) Oral daily  OLANZapine 2.5 milliGRAM(s) Oral two times a day  pantoprazole    Tablet 40 milliGRAM(s) Oral before breakfast  predniSONE   Tablet 15 milliGRAM(s) Oral daily  primidone 50 milliGRAM(s) Oral at bedtime  simvastatin 20 milliGRAM(s) Oral at bedtime    MEDICATIONS  (PRN):  acetaminophen   Tablet .. 650 milliGRAM(s) Oral every 6 hours PRN Temp greater or equal to 38C (100.4F), Mild Pain (1 - 3), Moderate Pain (4 - 6)  albuterol/ipratropium for Nebulization 3 milliLiter(s) Nebulizer every 6 hours PRN Shortness of Breath and/or Wheezing  dextrose 40% Gel 15 Gram(s) Oral once PRN Blood Glucose LESS THAN 70 milliGRAM(s)/deciLiter  glucagon  Injectable 1 milliGRAM(s) IntraMuscular once PRN Glucose <70 milliGRAM(s)/deciLiter  haloperidol    Injectable 2 milliGRAM(s) IntraMuscular every 4 hours PRN Agitation  haloperidol    Injectable 2 milliGRAM(s) IV Push every 4 hours PRN Agitation  melatonin 3 milliGRAM(s) Oral at bedtime PRN Insomnia  naproxen 250 milliGRAM(s) Oral every 6 hours PRN Moderate Pain (4 - 6)  OLANZapine Injectable 2.5 milliGRAM(s) IntraMuscular every 4 hours PRN agitation  traMADol 100 milliGRAM(s) Oral three times a day PRN Severe Pain (7 - 10)        Objective:    Vitals: Vital Signs Last 24 Hrs  T(C): --  T(F): --  HR: 82 (02-03-20 @ 07:02) (82 - 112)  BP: 127/87 (02-03-20 @ 07:02) (127/87 - 162/99)  BP(mean): --  RR: 12 (02-03-20 @ 07:02) (12 - 16)  SpO2: 94% (02-03-20 @ 07:02) (94% - 94%)            I&O's Summary    02 Feb 2020 07:01  -  03 Feb 2020 07:00  --------------------------------------------------------  IN: 1440 mL / OUT: 0 mL / NET: 1440 mL        PHYSICAL EXAM:  GENERAL: NAD  HEENT: PERRL, no scleral icterus, no head and neck lad   CHEST/LUNG: CTAB, no wheezing, crackles, or ronchi   HEART: RRR, normal S1, S2, no murmurs, gallops, or rubs appreciated   ABDOMEN: soft, nondistended, non-tender, normoactive, no HSM, no rebound, no guarding, no rigidity  SKIN: No rashes or lesions  NERVOUS SYSTEM: Alert & Oriented X3  PSYCH: calm and cooperative     LABS:    02-01    139  |  106  |  25<H>  ----------------------------<  316<H>  5.3   |  20<L>  |  1.09    Ca    9.1      01 Feb 2020 20:25  Phos  3.0     02-01  Mg     2.0     02-01    TPro  6.5  /  Alb  3.7  /  TBili  <0.1<L>  /  DBili  x   /  AST  19  /  ALT  20  /  AlkPhos  85  02-01                                            9.1    10.58 )-----------( 410      ( 01 Feb 2020 20:25 )             29.2     CAPILLARY BLOOD GLUCOSE      POCT Blood Glucose.: 342 mg/dL (02 Feb 2020 16:56)  POCT Blood Glucose.: 319 mg/dL (02 Feb 2020 13:42)          RADIOLOGY & ADDITIONAL TESTS:            Imaging Personally Reviewed:  [x ] YES  [ ] NO    Consultants involved in case:   Consultant(s) Notes Reviewed:  [ x] YES  [ ] NO:   Care Discussed with Consultants/Other Providers [x ] YES  [ ] NO ***************************************************************  Dr. Kellie Bernal  Internal Medicine   [facility] Pager: [phone]  [facility] Pager: [phone]   ***************************************************************    AFTAB BASS  64y  MRN: [id number]    Subjective:    Patient is a 64y old  Female who presents with a chief complaint of change in mental status (02 Feb 2020 14:16)      Interval history/overnight events:    Refusing meds, agitated yesterday. No other acute events ON. This morning, again agitated, delusional. Denies cp, SOB, fevers. c/o abd pain due to skin changes from eviscerated SBO in Jan 2019. Patient refusing to removal dressing over abdomen for exam.       MEDICATIONS  (STANDING):  budesonide  80 MICROgram(s)/formoterol 4.5 MICROgram(s) Inhaler 2 Puff(s) Inhalation two times a day  dextrose 5%. 1000 milliLiter(s) (50 mL/Hr) IV Continuous <Continuous>  dextrose 50% Injectable 12.5 Gram(s) IV Push once  dextrose 50% Injectable 25 Gram(s) IV Push once  dextrose 50% Injectable 25 Gram(s) IV Push once  enoxaparin Injectable 40 milliGRAM(s) SubCutaneous daily  gabapentin 400 milliGRAM(s) Oral three times a day  influenza   Vaccine 0.5 milliLiter(s) IntraMuscular once  insulin glargine Injectable (LANTUS) 30 Unit(s) SubCutaneous every morning  insulin lispro (HumaLOG) corrective regimen sliding scale   SubCutaneous three times a day before meals  insulin lispro Injectable (HumaLOG) 9 Unit(s) SubCutaneous three times a day before meals  levothyroxine 200 MICROGram(s) Oral <User Schedule>  metoprolol succinate ER 50 milliGRAM(s) Oral daily  OLANZapine 2.5 milliGRAM(s) Oral two times a day  pantoprazole    Tablet 40 milliGRAM(s) Oral before breakfast  predniSONE   Tablet 15 milliGRAM(s) Oral daily  primidone 50 milliGRAM(s) Oral at bedtime  simvastatin 20 milliGRAM(s) Oral at bedtime    MEDICATIONS  (PRN):  acetaminophen   Tablet .. 650 milliGRAM(s) Oral every 6 hours PRN Temp greater or equal to 38C (100.4F), Mild Pain (1 - 3), Moderate Pain (4 - 6)  albuterol/ipratropium for Nebulization 3 milliLiter(s) Nebulizer every 6 hours PRN Shortness of Breath and/or Wheezing  dextrose 40% Gel 15 Gram(s) Oral once PRN Blood Glucose LESS THAN 70 milliGRAM(s)/deciLiter  glucagon  Injectable 1 milliGRAM(s) IntraMuscular once PRN Glucose <70 milliGRAM(s)/deciLiter  haloperidol    Injectable 2 milliGRAM(s) IntraMuscular every 4 hours PRN Agitation  haloperidol    Injectable 2 milliGRAM(s) IV Push every 4 hours PRN Agitation  melatonin 3 milliGRAM(s) Oral at bedtime PRN Insomnia  naproxen 250 milliGRAM(s) Oral every 6 hours PRN Moderate Pain (4 - 6)  OLANZapine Injectable 2.5 milliGRAM(s) IntraMuscular every 4 hours PRN agitation  traMADol 100 milliGRAM(s) Oral three times a day PRN Severe Pain (7 - 10)        Objective:    Vitals: Vital Signs Last 24 Hrs  T(C): --  T(F): --  HR: 82 (02-03-20 @ 07:02) (82 - 112)  BP: 127/87 (02-03-20 @ 07:02) (127/87 - 162/99)  BP(mean): --  RR: 12 (02-03-20 @ 07:02) (12 - 16)  SpO2: 94% (02-03-20 @ 07:02) (94% - 94%)            I&O's Summary    02 Feb 2020 07:01  -  03 Feb 2020 07:00  --------------------------------------------------------  IN: 1440 mL / OUT: 0 mL / NET: 1440 mL        PHYSICAL EXAM:  GENERAL: elderly female, agitated, delusional  HEENT: PERRL, no scleral icterus, no head and neck lad   CHEST/LUNG: CTAB, no wheezing, crackles, or ronchi   HEART: RRR, normal S1, S2, no murmurs, gallops, or rubs appreciated   ABDOMEN: soft, distended lower abdomen at area of skin changes, TTP over lower abdomen, no guarding, rebound, rigidity,  normoactive, no HSM   SKIN: skin thickening over lower abdomen at eviscerated SBO site; patient refusing further eval   NERVOUS SYSTEM: Alert & Oriented X3 but delusional   PSYCH: agitated     LABS:    02-01    139  |  106  |  25<H>  ----------------------------<  316<H>  5.3   |  20<L>  |  1.09    Ca    9.1      01 Feb 2020 20:25  Phos  3.0     02-01  Mg     2.0     02-01    TPro  6.5  /  Alb  3.7  /  TBili  <0.1<L>  /  DBili  x   /  AST  19  /  ALT  20  /  AlkPhos  85  02-01                                            9.1    10.58 )-----------( 410      ( 01 Feb 2020 20:25 )             29.2     CAPILLARY BLOOD GLUCOSE      POCT Blood Glucose.: 342 mg/dL (02 Feb 2020 16:56)  POCT Blood Glucose.: 319 mg/dL (02 Feb 2020 13:42)          RADIOLOGY & ADDITIONAL TESTS:            Imaging Personally Reviewed:  [x ] YES  [ ] NO    Consultants involved in case:   Consultant(s) Notes Reviewed:  [ x] YES  [ ] NO:   Care Discussed with Consultants/Other Providers [x ] YES  [ ] NO

## 2020-02-03 NOTE — PROGRESS NOTE ADULT - SUBJECTIVE AND OBJECTIVE BOX
Chief Complaint/Follow-up on: T2DM    Subjective: Pt found sleeping but easily aroused. She was angered as I am a "fake doctor" and Dr. Novoa the head of the hospital is her real doctor and he said that she does not need to have POC glucose or insulin. She is also insistent that we are forcing her to take insulin but she does not have diabetes even though she was on januvia. She called her son in [place] on FaceTime and told him that I was forcing things on her due to him and his brother. She also called the  as was as 911 to claim that she was being forced to take meds. I was able to let the  know that the patient is in the hospital was not in her right mindset so no need for the police to come. After about 20 minutes of her hurling insults, calling her son/911/sister and complaining about being hungry, she cooperated with her bs testing and insulin.    MEDICATIONS  (STANDING):  budesonide  80 MICROgram(s)/formoterol 4.5 MICROgram(s) Inhaler 2 Puff(s) Inhalation two times a day  dextrose 5%. 1000 milliLiter(s) (50 mL/Hr) IV Continuous <Continuous>  dextrose 50% Injectable 12.5 Gram(s) IV Push once  dextrose 50% Injectable 25 Gram(s) IV Push once  dextrose 50% Injectable 25 Gram(s) IV Push once  enoxaparin Injectable 40 milliGRAM(s) SubCutaneous daily  gabapentin 400 milliGRAM(s) Oral three times a day  influenza   Vaccine 0.5 milliLiter(s) IntraMuscular once  insulin glargine Injectable (LANTUS) 30 Unit(s) SubCutaneous every morning  insulin lispro (HumaLOG) corrective regimen sliding scale   SubCutaneous three times a day before meals  insulin lispro Injectable (HumaLOG) 9 Unit(s) SubCutaneous three times a day before meals  metoprolol succinate ER 50 milliGRAM(s) Oral daily  OLANZapine 2.5 milliGRAM(s) Oral two times a day  pantoprazole    Tablet 40 milliGRAM(s) Oral before breakfast  predniSONE   Tablet 15 milliGRAM(s) Oral daily  primidone 50 milliGRAM(s) Oral at bedtime  simvastatin 20 milliGRAM(s) Oral at bedtime    MEDICATIONS  (PRN):  acetaminophen   Tablet .. 650 milliGRAM(s) Oral every 6 hours PRN Temp greater or equal to 38C (100.4F), Mild Pain (1 - 3), Moderate Pain (4 - 6)  albuterol/ipratropium for Nebulization 3 milliLiter(s) Nebulizer every 6 hours PRN Shortness of Breath and/or Wheezing  dextrose 40% Gel 15 Gram(s) Oral once PRN Blood Glucose LESS THAN 70 milliGRAM(s)/deciLiter  glucagon  Injectable 1 milliGRAM(s) IntraMuscular once PRN Glucose <70 milliGRAM(s)/deciLiter  haloperidol    Injectable 2 milliGRAM(s) IntraMuscular every 4 hours PRN Agitation  haloperidol    Injectable 2 milliGRAM(s) IV Push every 4 hours PRN Agitation  melatonin 3 milliGRAM(s) Oral at bedtime PRN Insomnia  naproxen 250 milliGRAM(s) Oral every 6 hours PRN Moderate Pain (4 - 6)  OLANZapine Injectable 2.5 milliGRAM(s) IntraMuscular every 4 hours PRN agitation  traMADol 100 milliGRAM(s) Oral three times a day PRN Severe Pain (7 - 10)      PHYSICAL EXAM:  VITALS: T(C): --  T(F): --  HR: 82 (02-03-20 @ 07:02) (82 - 97)  BP: 127/87 (02-03-20 @ 07:02) (127/87 - 133/83)  RR:  (12 - 12)  SpO2:  (94% - 94%)  Wt(kg): --  PSYCH: restricted affect, +psychomotor agitiation  Pt refused to let me examine her      POCT Blood Glucose.: 247 mg/dL (02-03-20 @ 13:34)  POCT Blood Glucose.: 147 mg/dL (02-03-20 @ 08:30)  POCT Blood Glucose.: 342 mg/dL (02-02-20 @ 16:56)  POCT Blood Glucose.: 319 mg/dL (02-02-20 @ 13:42)  POCT Blood Glucose.: 264 mg/dL (02-01-20 @ 12:46)  POCT Blood Glucose.: 122 mg/dL (02-01-20 @ 09:06)  POCT Blood Glucose.: 326 mg/dL (01-31-20 @ 17:30)    02-01    139  |  106  |  25<H>  ----------------------------<  316<H>  5.3   |  20<L>  |  1.09    EGFR if : 63  EGFR if non : 54<L>    Ca    9.1      02-01  Mg     2.0     02-01  Phos  3.0     02-01    TPro  6.5  /  Alb  3.7  /  TBili  <0.1<L>  /  DBili  x   /  AST  19  /  ALT  20  /  AlkPhos  85  02-01          Thyroid Function Tests:  02-01 @ 22:37 TSH 8.35 FreeT4 1.3   01-28 @ 17:35 FreeT4 0.7       Hemoglobin A1C, Whole Blood: 9.0 % <H> [4.0 - 5.6] (01-28-20 @ 19:30)  Hemoglobin A1C, Whole Blood: 9.2 % <H> [4.0 - 5.6] (01-27-20 @ 17:36)

## 2020-02-03 NOTE — PROGRESS NOTE ADULT - PROBLEM SELECTOR PLAN 2
Pt is inconsistent with her eating as well as her agreement with POC glucose/insulin administration as a result would continue current regimen: Lantus 30 units SQ qAM  and Humalog 9 units tid-ac. Spoke with asst mgr Riley, nursing finds it easier to give SQ insulin than orals.    Discharge: TBD based on dispo and patient's functional status    Jia Jules MD  Pager: [phone]  Nights and Weekends: [phone]

## 2020-02-03 NOTE — PROGRESS NOTE ADULT - PROBLEM SELECTOR PLAN 4
- Bcx + for GPR on 1/12, 1/2 bottles   -+Bcx from 1/12 sent to [facility] laboratories, currently no results  - Blood cx obtained on 1/25 no growth X 5 days.  - Will monitor off Abx

## 2020-02-04 DIAGNOSIS — T14.90XA INJURY, UNSPECIFIED, INITIAL ENCOUNTER: ICD-10-CM

## 2020-02-04 LAB
ANA TITR SER: NEGATIVE — SIGNIFICANT CHANGE UP
ANTI-RIBONUCLEAR PROTEIN: <0.2 AI — SIGNIFICANT CHANGE UP
CULTURE RESULTS: SIGNIFICANT CHANGE UP
DSDNA AB SER-ACNC: <12 IU/ML — SIGNIFICANT CHANGE UP
ENA SM AB FLD QL: <0.2 AI — SIGNIFICANT CHANGE UP
GLUCOSE BLDC GLUCOMTR-MCNC: 168 MG/DL — HIGH (ref 70–99)
GLUCOSE BLDC GLUCOMTR-MCNC: 212 MG/DL — HIGH (ref 70–99)
GLUCOSE BLDC GLUCOMTR-MCNC: 272 MG/DL — HIGH (ref 70–99)
GLUCOSE BLDC GLUCOMTR-MCNC: 342 MG/DL — HIGH (ref 70–99)
GRAM STN FLD: SIGNIFICANT CHANGE UP
N-METHYL-DA RECEPTOR AB IGG BY CBA-IFA, SERUM W/RFLX TITER: SIGNIFICANT CHANGE UP
RIBOSOMAL P AB SER-ACNC: <0.2 AI — SIGNIFICANT CHANGE UP
SPECIMEN SOURCE: SIGNIFICANT CHANGE UP

## 2020-02-04 PROCEDURE — 99233 SBSQ HOSP IP/OBS HIGH 50: CPT | Mod: GC

## 2020-02-04 PROCEDURE — 99233 SBSQ HOSP IP/OBS HIGH 50: CPT

## 2020-02-04 RX ORDER — INSULIN LISPRO 100/ML
VIAL (ML) SUBCUTANEOUS AT BEDTIME
Refills: 0 | Status: DISCONTINUED | OUTPATIENT
Start: 2020-02-04 | End: 2020-02-05

## 2020-02-04 RX ORDER — LANOLIN ALCOHOL/MO/W.PET/CERES
3 CREAM (GRAM) TOPICAL ONCE
Refills: 0 | Status: COMPLETED | OUTPATIENT
Start: 2020-02-04 | End: 2020-02-04

## 2020-02-04 RX ORDER — INSULIN LISPRO 100/ML
12 VIAL (ML) SUBCUTANEOUS
Refills: 0 | Status: DISCONTINUED | OUTPATIENT
Start: 2020-02-04 | End: 2020-02-05

## 2020-02-04 RX ADMIN — BUDESONIDE AND FORMOTEROL FUMARATE DIHYDRATE 2 PUFF(S): 160; 4.5 AEROSOL RESPIRATORY (INHALATION) at 17:09

## 2020-02-04 RX ADMIN — ENOXAPARIN SODIUM 40 MILLIGRAM(S): 100 INJECTION SUBCUTANEOUS at 13:55

## 2020-02-04 RX ADMIN — INSULIN GLARGINE 30 UNIT(S): 100 INJECTION, SOLUTION SUBCUTANEOUS at 08:20

## 2020-02-04 RX ADMIN — Medication 3 MILLIGRAM(S): at 00:17

## 2020-02-04 RX ADMIN — PRIMIDONE 50 MILLIGRAM(S): 250 TABLET ORAL at 21:50

## 2020-02-04 RX ADMIN — Medication 50 MILLIGRAM(S): at 06:09

## 2020-02-04 RX ADMIN — OLANZAPINE 2.5 MILLIGRAM(S): 15 TABLET, FILM COATED ORAL at 17:59

## 2020-02-04 RX ADMIN — GABAPENTIN 400 MILLIGRAM(S): 400 CAPSULE ORAL at 08:10

## 2020-02-04 RX ADMIN — BUDESONIDE AND FORMOTEROL FUMARATE DIHYDRATE 2 PUFF(S): 160; 4.5 AEROSOL RESPIRATORY (INHALATION) at 05:58

## 2020-02-04 RX ADMIN — Medication 0: at 21:49

## 2020-02-04 RX ADMIN — Medication 12 UNIT(S): at 17:53

## 2020-02-04 RX ADMIN — Medication 9 UNIT(S): at 13:54

## 2020-02-04 RX ADMIN — SIMVASTATIN 20 MILLIGRAM(S): 20 TABLET, FILM COATED ORAL at 21:51

## 2020-02-04 RX ADMIN — Medication 250 MILLIGRAM(S): at 02:30

## 2020-02-04 RX ADMIN — Medication 9 UNIT(S): at 08:22

## 2020-02-04 RX ADMIN — TRAMADOL HYDROCHLORIDE 100 MILLIGRAM(S): 50 TABLET ORAL at 03:00

## 2020-02-04 RX ADMIN — Medication 2: at 08:22

## 2020-02-04 RX ADMIN — Medication 6: at 17:53

## 2020-02-04 RX ADMIN — TRAMADOL HYDROCHLORIDE 100 MILLIGRAM(S): 50 TABLET ORAL at 17:54

## 2020-02-04 RX ADMIN — Medication 8: at 13:54

## 2020-02-04 RX ADMIN — GABAPENTIN 400 MILLIGRAM(S): 400 CAPSULE ORAL at 17:54

## 2020-02-04 RX ADMIN — Medication 175 MICROGRAM(S): at 06:09

## 2020-02-04 RX ADMIN — Medication 15 MILLIGRAM(S): at 08:36

## 2020-02-04 RX ADMIN — PANTOPRAZOLE SODIUM 40 MILLIGRAM(S): 20 TABLET, DELAYED RELEASE ORAL at 08:10

## 2020-02-04 RX ADMIN — TRAMADOL HYDROCHLORIDE 100 MILLIGRAM(S): 50 TABLET ORAL at 02:02

## 2020-02-04 NOTE — PROGRESS NOTE ADULT - SUBJECTIVE AND OBJECTIVE BOX
***************************************************************  Dr. Kellie Bernal  Internal Medicine   [facility] Pager: [phone]  [facility] Pager: [phone]   ***************************************************************    AFTAB BASS  64y  MRN: [id number]    Subjective:    Patient is a 64y old  Female who presents with a chief complaint of change in mental status (03 Feb 2020 15:29)      Interval history/overnight events:  FLORA ON. Patient seen and examined at bedside. Agitated. Calling security multiple times. Declining to be examined in detail. Thinks that a Dr. Novoa is following her during this hospitalization and that he is the only doctor entitled to examine her.       MEDICATIONS  (STANDING):  budesonide  80 MICROgram(s)/formoterol 4.5 MICROgram(s) Inhaler 2 Puff(s) Inhalation two times a day  dextrose 5%. 1000 milliLiter(s) (50 mL/Hr) IV Continuous <Continuous>  dextrose 50% Injectable 12.5 Gram(s) IV Push once  dextrose 50% Injectable 25 Gram(s) IV Push once  dextrose 50% Injectable 25 Gram(s) IV Push once  enoxaparin Injectable 40 milliGRAM(s) SubCutaneous daily  gabapentin 400 milliGRAM(s) Oral three times a day  influenza   Vaccine 0.5 milliLiter(s) IntraMuscular once  insulin glargine Injectable (LANTUS) 30 Unit(s) SubCutaneous every morning  insulin lispro (HumaLOG) corrective regimen sliding scale   SubCutaneous three times a day before meals  insulin lispro (HumaLOG) corrective regimen sliding scale   SubCutaneous at bedtime  insulin lispro Injectable (HumaLOG) 9 Unit(s) SubCutaneous three times a day before meals  levothyroxine 175 MICROGram(s) Oral daily  metoprolol succinate ER 50 milliGRAM(s) Oral daily  OLANZapine 2.5 milliGRAM(s) Oral two times a day  pantoprazole    Tablet 40 milliGRAM(s) Oral before breakfast  predniSONE   Tablet 15 milliGRAM(s) Oral daily  primidone 50 milliGRAM(s) Oral at bedtime  simvastatin 20 milliGRAM(s) Oral at bedtime    MEDICATIONS  (PRN):  acetaminophen   Tablet .. 650 milliGRAM(s) Oral every 6 hours PRN Temp greater or equal to 38C (100.4F), Mild Pain (1 - 3), Moderate Pain (4 - 6)  albuterol/ipratropium for Nebulization 3 milliLiter(s) Nebulizer every 6 hours PRN Shortness of Breath and/or Wheezing  dextrose 40% Gel 15 Gram(s) Oral once PRN Blood Glucose LESS THAN 70 milliGRAM(s)/deciLiter  glucagon  Injectable 1 milliGRAM(s) IntraMuscular once PRN Glucose <70 milliGRAM(s)/deciLiter  haloperidol    Injectable 2 milliGRAM(s) IntraMuscular every 4 hours PRN Agitation  haloperidol    Injectable 2 milliGRAM(s) IV Push every 4 hours PRN Agitation  melatonin 3 milliGRAM(s) Oral at bedtime PRN Insomnia  naproxen 250 milliGRAM(s) Oral every 6 hours PRN Moderate Pain (4 - 6)  OLANZapine Injectable 2.5 milliGRAM(s) IntraMuscular every 4 hours PRN agitation  traMADol 100 milliGRAM(s) Oral three times a day PRN Severe Pain (7 - 10)        Objective:    Vitals: Vital Signs Last 24 Hrs  T(C): --  T(F): --  HR: 89 (02-04-20 @ 06:04) (84 - 98)  BP: 158/87 (02-04-20 @ 06:04) (142/77 - 158/87)  BP(mean): --  RR: 16 (02-04-20 @ 06:04) (16 - 16)  SpO2: 96% (02-04-20 @ 06:04) (95% - 100%)            I&O's Summary      PHYSICAL EXAM: declining full exam   GENERAL: elderly female, agitated, delusional, calling security multiple times during visit   HEENT: PERRL, no scleral icterus, no head and neck lad   CHEST/LUNG: not assessed   HEART:   ABDOMEN: soft, distended lower abdomen at area of skin changes, TTP over lower abdomen, no guarding, rebound, rigidity   SKIN: skin thickening over lower abdomen at eviscerated SBO site; patient refusing further eval   NERVOUS SYSTEM: Alert & Oriented X3 but delusional       LABS:    02-01    139  |  106  |  25<H>  ----------------------------<  316<H>  5.3   |  20<L>  |  1.09    Ca    9.1      01 Feb 2020 20:25    TPro  6.5  /  Alb  3.7  /  TBili  <0.1<L>  /  DBili  x   /  AST  19  /  ALT  20  /  AlkPhos  85  02-01                                            9.1    10.58 )-----------( 410      ( 01 Feb 2020 20:25 )             29.2     CAPILLARY BLOOD GLUCOSE      POCT Blood Glucose.: 270 mg/dL (03 Feb 2020 22:42)  POCT Blood Glucose.: 381 mg/dL (03 Feb 2020 17:43)  POCT Blood Glucose.: 247 mg/dL (03 Feb 2020 13:34)  POCT Blood Glucose.: 147 mg/dL (03 Feb 2020 08:30)          RADIOLOGY & ADDITIONAL TESTS:            Imaging Personally Reviewed:  [ ] YES  [ ] NO    Consultants involved in case:   Consultant(s) Notes Reviewed:  [ ] YES  [ ] NO:   Care Discussed with Consultants/Other Providers [ ] YES  [ ] NO ***************************************************************  Dr. Kellie Bernal  Internal Medicine   [facility] Pager: [phone]  [facility] Pager: [phone]   ***************************************************************    AFTAB BASS  64y  MRN: [id number]    Subjective:    Patient is a 64y old  Female who presents with a chief complaint of change in mental status (03 Feb 2020 15:29)      Interval history/overnight events:  FLORA ON. Patient seen and examined at bedside. Agitated. Calling security multiple times. Declining to be examined in detail. Thinks that a Dr. Novoa is following her during this hospitalization and that he is the only doctor entitled to examine her. Otherwise, denies cp, sob, fevers, chills, sweats, abdominal pain.       MEDICATIONS  (STANDING):  budesonide  80 MICROgram(s)/formoterol 4.5 MICROgram(s) Inhaler 2 Puff(s) Inhalation two times a day  dextrose 5%. 1000 milliLiter(s) (50 mL/Hr) IV Continuous <Continuous>  dextrose 50% Injectable 12.5 Gram(s) IV Push once  dextrose 50% Injectable 25 Gram(s) IV Push once  dextrose 50% Injectable 25 Gram(s) IV Push once  enoxaparin Injectable 40 milliGRAM(s) SubCutaneous daily  gabapentin 400 milliGRAM(s) Oral three times a day  influenza   Vaccine 0.5 milliLiter(s) IntraMuscular once  insulin glargine Injectable (LANTUS) 30 Unit(s) SubCutaneous every morning  insulin lispro (HumaLOG) corrective regimen sliding scale   SubCutaneous three times a day before meals  insulin lispro (HumaLOG) corrective regimen sliding scale   SubCutaneous at bedtime  insulin lispro Injectable (HumaLOG) 9 Unit(s) SubCutaneous three times a day before meals  levothyroxine 175 MICROGram(s) Oral daily  metoprolol succinate ER 50 milliGRAM(s) Oral daily  OLANZapine 2.5 milliGRAM(s) Oral two times a day  pantoprazole    Tablet 40 milliGRAM(s) Oral before breakfast  predniSONE   Tablet 15 milliGRAM(s) Oral daily  primidone 50 milliGRAM(s) Oral at bedtime  simvastatin 20 milliGRAM(s) Oral at bedtime    MEDICATIONS  (PRN):  acetaminophen   Tablet .. 650 milliGRAM(s) Oral every 6 hours PRN Temp greater or equal to 38C (100.4F), Mild Pain (1 - 3), Moderate Pain (4 - 6)  albuterol/ipratropium for Nebulization 3 milliLiter(s) Nebulizer every 6 hours PRN Shortness of Breath and/or Wheezing  dextrose 40% Gel 15 Gram(s) Oral once PRN Blood Glucose LESS THAN 70 milliGRAM(s)/deciLiter  glucagon  Injectable 1 milliGRAM(s) IntraMuscular once PRN Glucose <70 milliGRAM(s)/deciLiter  haloperidol    Injectable 2 milliGRAM(s) IntraMuscular every 4 hours PRN Agitation  haloperidol    Injectable 2 milliGRAM(s) IV Push every 4 hours PRN Agitation  melatonin 3 milliGRAM(s) Oral at bedtime PRN Insomnia  naproxen 250 milliGRAM(s) Oral every 6 hours PRN Moderate Pain (4 - 6)  OLANZapine Injectable 2.5 milliGRAM(s) IntraMuscular every 4 hours PRN agitation  traMADol 100 milliGRAM(s) Oral three times a day PRN Severe Pain (7 - 10)        Objective:    Vitals: Vital Signs Last 24 Hrs  T(C): --  T(F): --  HR: 89 (02-04-20 @ 06:04) (84 - 98)  BP: 158/87 (02-04-20 @ 06:04) (142/77 - 158/87)  BP(mean): --  RR: 16 (02-04-20 @ 06:04) (16 - 16)  SpO2: 96% (02-04-20 @ 06:04) (95% - 100%)            I&O's Summary      PHYSICAL EXAM: declining full exam   GENERAL: elderly female, agitated, delusional, calling security multiple times during visit   HEENT: PERRL, no scleral icterus, no head and neck lad   CHEST/LUNG: not assessed   HEART:   ABDOMEN: soft, distended lower abdomen at area of skin changes, TTP over lower abdomen, no guarding, rebound, rigidity   SKIN: skin thickening over lower abdomen at eviscerated SBO site; patient refusing further eval   NERVOUS SYSTEM: Alert & Oriented X3 but delusional       LABS:    02-01    139  |  106  |  25<H>  ----------------------------<  316<H>  5.3   |  20<L>  |  1.09    Ca    9.1      01 Feb 2020 20:25    TPro  6.5  /  Alb  3.7  /  TBili  <0.1<L>  /  DBili  x   /  AST  19  /  ALT  20  /  AlkPhos  85  02-01                                            9.1    10.58 )-----------( 410      ( 01 Feb 2020 20:25 )             29.2     CAPILLARY BLOOD GLUCOSE      POCT Blood Glucose.: 270 mg/dL (03 Feb 2020 22:42)  POCT Blood Glucose.: 381 mg/dL (03 Feb 2020 17:43)  POCT Blood Glucose.: 247 mg/dL (03 Feb 2020 13:34)  POCT Blood Glucose.: 147 mg/dL (03 Feb 2020 08:30)          RADIOLOGY & ADDITIONAL TESTS:            Imaging Personally Reviewed:  [ ] YES  [ ] NO    Consultants involved in case:   Consultant(s) Notes Reviewed:  [ ] YES  [ ] NO:   Care Discussed with Consultants/Other Providers [ ] YES  [ ] NO

## 2020-02-04 NOTE — PROGRESS NOTE ADULT - PROBLEM SELECTOR PLAN 5
- Moderate pulmonary HTN per TTE (1/13)  - Per son, sildenafil was discontinued   - Continue with Toprol 50 - Bcx + for GPR on 1/12, 1 out of 2 bottles   -+Bcx from 1/12 sent to [facility] laboratories, currently no results  - Blood cx obtained on 1/25 no growth X 5 days.  - Will monitor off Abx

## 2020-02-04 NOTE — PROGRESS NOTE ADULT - PROBLEM SELECTOR PLAN 1
-Her TSH levels have not been consistent which may be due to assay interference:  1/22 TSH 29  1/25   1/27 TSH 52.3  2/3  TSH 8.35  -C/w Synthroid 175mcg po q daily on an empty stomach!!  -Recheck TSH in 40-6 weeks   -Plan discussed with pt/team.  Contact info: [phone] (24/7). pager [phone]

## 2020-02-04 NOTE — PROGRESS NOTE ADULT - PROBLEM SELECTOR PLAN 2
-Test BG ac and hs  -C/w Lantus 30 units q am  -Increase Humalog to 12 units ac meals. Will adjust as needed. Make sure pt eats. Hold if pt not eating  -c/w Humalog moderate correction scale since pt is on Prednisone.  --Pt unable to care for self at this time. Pt will need to continue basal/bolus therapy upon discharge.  -Plan discussed with pt's team.  Contact info: [phone] (24/7). pager [phone]

## 2020-02-04 NOTE — PROGRESS NOTE ADULT - PROBLEM SELECTOR PLAN 4
- Bcx + for GPR on 1/12, 1/2 bottles   -+Bcx from 1/12 sent to [facility] laboratories, currently no results  - Blood cx obtained on 1/25 no growth X 5 days.  - Will monitor off Abx - Bcx + for GPR on 1/12, 1 out of 2 bottles   -+Bcx from 1/12 sent to [facility] laboratories, currently no results  - Blood cx obtained on 1/25 no growth X 5 days.  - Will monitor off Abx Patient w/ anterior abdominal wound from SBO revision   - wound care following   - patient refusing dressing changes per nursing and exam   - risk of infection discussed with ana maría Pisano  - CADEN for s&s of infection Patient w/ anterior abdominal wound from SBO revision   - wound care following   - patient refusing dressing changes per nursing and exam by physician   - s/p dressing change w/ ana maría Pisano at bedside 2/3   - risk of infection discussed with ana maría Pisano  - CTM for s&s of infection

## 2020-02-04 NOTE — CONSULT NOTE ADULT - SUBJECTIVE AND OBJECTIVE BOX
Consult requested by: ARMANDO Reddy MD  Role: Resident  Service: Medicine  Attending: JASIEL Ingram MD  Other Consultants: BRENDA John MD, Psychiatry  Consultant: Monik Valenzuela MS, MD, [facility] Medical Ethicist/Leonel Transplant Fellow Contact # [phone] (c) [phone]  Consult Question/Purpose:   Assist the team in ethical shared decision making of a 64 year old female admitted for a change in mental status of unclear etiology who has no capacity to consent and is refusing most interventions.      Clinical summary: This is a 64 year old female known to the Ethics Service (Feb/March 2019) with a PMH of functional paraplegia (bedbound), hypothyroidism, pulmonary hypertension (HTN), Diabetes Mellitus (DM) on insulin, COPD/CHRIS (on nocturnal CPAP and 3L home oxygen), Rheumatoid Arthritis on chronic prednisone, chronic tremors, Small Bowel Obstruction s/p exploratory laparotomy with lysis of adhesions (2018) (4 retention sutures) complicated by evisceration secondary to a coughing episode s/p exploratory laparotomy(2018) (6 retention sutures) complicated by multifocal pneumonia, developed hypercapnic respiratory failure requiring intubation 1/6/19, s/p bronch 1/9/19, and extubated on 1/24/19, course further complicated by Enterococci bacteremia presented to the ED for altered mental status x 3 weeks. According to the history obtained from the patient’s son and daughter, the patient is paranoid and accusing the family of stealing as well as called police on multiple occasions claiming son was trying to hurt her as well as accusing him of "stealing her oxygen". Recently has been spitting at \[facility]\"" and caretaker son, throwing her bedpan filled with urine and feces at them. In the ED, the patient agitated requiring zyprexa total 20mg, haloperidol 10, and versed 8mg. CT Scan of Head obtained revealed no acute intracranial hemorrhage or mass effect. Psychiatry evaluation in the ED concluded that treatment of her underlying medical conditions should help correct the altered mental status. Their recommendation is haloperidol 5 mg QD, with benztropine 0.5 mg BID, to help with paranoid symptoms. The patient was found to be significantly hypothyroid (), extremely combative and paranoid of family and healthcare staff.    Her hospital course is significant for admission to medicine for treatment of her altered mental status, severe hypothyroidism, and diabetes. Psychiatry, Rheumatology, Endocrinology and Wound Care are all on consult. The patient is reported to be agitated and combative. Psychiatry re-evaluated the patient on 1/31/2020 and found that based on the patient's lack of any psychiatric history, sudden onset of behavioral changes in an older patient, the intermittent nature of the symptoms, and the patient's many serious medical comorbidities, there is evidence for organic origin of illness. Multiple possible causes for delirium including notable hypothyroidism, electrolyte disturbances, history of opioid use, possible steroid psychosis, and urinary tract infection. Continued recommendation of Haldol and Zyprexa, however addressing underlying medical issues would be primarily successful to improve her delirium. Rheumatology evaluation on 1/28/2020 suggested that rare cases of acute encephalopathy have been reported with long standing RA but will need more evidence to consider this diagnosis and suggest MRI brain to look for leptomeningeal or white matter disease or presence of rheumatoid lesions/nodules in the brain. Suggest MRA/MRV if MRI is negative. Patient will likely require LP for further confirmation. The other possibility is that of PACNS or autoimmune encephalitis which can also be confirmed with suggested studies. Ethics consult initiated to assist with shared decision making regarding plan of care for a patient with encephalopathy, hyperglycemia and severe hypothyroidism given that patient is actively refusing certain treatments, imaging and medical interventions.    Patient Decision-Making Capacity:        Patient deemed to lack capacity by Psychiatry on 1/25/20  Prognosis Estimate (range in days, wks, mos, yrs): Potentially years if accepts treatment  Patient Aware of:  Prognosis: Unclear Diagnosis: Unclear  Evidence of Patient’s Preference of Life-Sustaining Treatment (Written or Oral): Living Will dated 9/3/19  Resuscitation status:   DNR: No    DNI:  No     Name of medical decision-maker should patient lack capacity: Heavenly Hernández, Daughter [phone] and Norris SAM Rush III, Son [phone]. Both named as equal decision makers in Living Will dated 9/3/19.  Stakeholders: None    Discussion with ARMANDO Reddy MD ( Resident) from 1500a to [id number] on 1/30/2020: Discussed patient’s medical condition. Psychiatry has deemed her without capacity. The patient’s children are in agreement with proposed treatments. The patient requires finger sticks, insulin and lovenox and most times is refusing. Inquired about cooperation with certain staff and family. Team to try to arrange staff to assist in assent.   Discussion with team including Dr. Ingram (Attending) from 1545 to 1600 on 1/31/2020: Discussed case in detail including patient’s behavior. The patient is currently accepting certain medications and is combative at times. She is verbally abusive. Discussed need for further workup to properly diagnose the etiology of the altered mental status, specifically MRI and lumbar puncture.  Discussion with patient on 1/31/2020 at 1600: Attempted to meet with patient and she stated: "Get out".    Ethics analysis:  THE CENTRAL ETHICAL ISSUE IS THE CONFLICT BETWEEN (A) PROVIDER BENEFICENCE & NON-MALEFICENECE, ON THE ONE HAND, AND (B) PATIENT AUTONOMY.   The ethical analysis faces the substantial difficulty of balancing 4  bioethical operational principles in treating the patient’s severe hypothyroidism, hyperglycemia and encephalopathy of unknown etiology (infectious versus metabolic versus psychiatric): (1) Beneficence, which leads her physicians to recommend urgent treatment; (2) Respect for Autonomy, the patient’s own right to self-determination in matters related to the integrity of her body and self; and (3) Non-Maleficence, the physician’s duty to "do no harm" by interventions that may have distinct risks.  Lumbar Punctures have some risks. The alternative treatment approaches – no further interventions – has its own inherent risks of a potential treatable condition left untreated causing disability and/or death. In this case, proportionality (the assessment of benefits versus risks i.e. beneficence vs nonmaleficence) leads her physicians to recommend treatment of the hypothyroidism and hyperglycemia. This decision must be balanced with the principles of Autonomy and Social Justice.  A patient’s autonomy is best preserved through informed consent by the autonomous patient with capacity. In the [place], adults with decision-making capacity (i.e., the ability to understand the consequences of their decisions) have the right to refuse medical therapy, even if the therapy is thought to be life-saving.1 A medical team may challenge a refusal only if they think that the patient does not understand the therapy, alternatives, or consequences of refusal or is otherwise impaired (eg, suicidal or depressed). In this case, Ms. Morrow lacks decision making capacity at this time to refuse the proposed treatments, which has compromised his ability to exercise her autonomy. Capacity can be evaluated utilizing the pneumonic (C-U-A-R). Communication, understanding, appreciation and reasoning.  Ms. Morrow is able to articulate. She has refused certain treatments at times. As this patient lacks capacity, surrogate-decision making is indicated, and the patient has a son and daughter to make decisions for her. Ethical analysis concurs with the clinical team regarding the patient’s lack of capacity. She has limited insight into her medical condition, and lacks reasoning and therefore, capacity for these specific medical decisions. Yet, proportionally we must also weigh the acceptable standard of care.    Ms. Morrow also has MORAL STATUS – that is, her distinct personhood, personal experience and interpretation of suffering, life-story, etc. – which must be respected. Therefore, the ethical analysis must consider whether it is ethically reasonable to coerce or utilize restraint in a patient empathetically articulating the refusal of medical intervention.    Coercive treatment might sometimes only seemingly conflict with autonomy considerations, namely when the patient has not autonomous capacity. It is reasonable to believe that most people accept and find comfort in the thought that they will be provided medical care if they become too ill to consent to the care needed, and that it is therefore in line with their autonomous will as healthy individuals.2 Although this patient is not capable of autonomous choice at this particular moment in time, autonomy effects of available choices must still be considered. The concept of authenticity refers to the deeply set values, aims and interests of a patient. In September 2019, Ms. Morrow completed a Living Will specifically outlining her wishes which include the statement: "My wish is for that all medical options to be exhausted before exploring any other medical options. I am full code all the way!", suggesting she does desire medical care, but her current thought process could be hindered by her current condition. Therefore, treatment considerations should be weighed in light of best interests, benefits, burdens and risk to the patient’s authentic self. Coercive treatment is, thus, acceptable only on the condition that:     (1)	The patient cannot make an autonomous decision about treatment and  (2)	Treatment is in the patient’s authentic interest.   Coercive treatment cannot be defended when the patient makes an autonomous decision against treatment or when treatment is not in the patient’s genuine interest. In this case, proportionality suggests that without intervention Ms. Morrow could experience life threatening complications. [facility] restraint policy, Policy # PCS.1614 Non-Violent / Non Self- Destructive: Level One restraint standards are implemented for medical or surgical purposes and apply when the primary reason for use directly supports medical healing and to: 1) Allow medical treatments to continue without interruption, 2) Prevent pulling out necessary tubes or drains, and 3) Provide safety when the patient is unable to follow directions. Indications for this policy include: medical management, disorganization, agitation as well as aggression toward self and others.    In this case, the patient has a potentially treatable condition and is currently incapacitated from making medical decisions.  There is family at this time to weigh the benefits of treatments, quality of life and risks of treatments including the alternative of not providing treatment. Presently the proposed treatment is for the patient to undergo an MRI as well as a Lumbar Puncture under anesthesia to determine a medical cause for her altered mental status.

## 2020-02-04 NOTE — PROGRESS NOTE ADULT - ASSESSMENT
64F w/ a PMHx arthritis, hypothyroidism, pulmonary HTN, DM on insulin, COPD/CHRIS on noctournal CPAP and 3L home), RA on chronic prednisone, chronic tremors, SBO s/p ex lap with lysis of adhesions c/b evisceration 2/2 a coughing episode s/p repeat ex lap c/b multifocal pneumonia and hypercapnic respiratory failure requiring intubation 1/6/19, s/p bronch 1/9/19, and extubated on 1/24/19; further c/b Enterococci bacteremia), p/w agitation 3 weeks of unclear etiology, found to be significantly hypothyroid (), extremely combative and paranoid of family and healthcare staff.

## 2020-02-04 NOTE — CONSULT NOTE ADULT - CONSULT REASON
Assist the team in ethical shared decision making of a 64 year old female admitted for a change in mental status of unclear etiology who has no capacity to consent and is refusing most interventions.

## 2020-02-04 NOTE — PROGRESS NOTE ADULT - PROBLEM SELECTOR PLAN 7
- Continue with prednisone 15 mg  - Tramadol PRN  - Rheum - no rheumatological involvement - Continue with prednisone 15 mg  - Tramadol PRN -  with slight AG (18), BHB 4 (elevated) on presentation; had received insulin prior to presentation  - Per endocrine, c/w lantus 30 + Humalog 9 TID with meals

## 2020-02-04 NOTE — CONSULT NOTE ADULT - ASSESSMENT
CONCLUSION:  In this case, the patient requires continued medical management using restraints (chemical or physical) when necessary to prevent life threatening complications and accurate prognostication as well as attempting to restore capacity. In this case, MRI and Lumbar Puncture under anesthesia would be permissible for prognostication of the patient’s altered mental status.     The Ethics Service will remain available for further conversation about the patient’s current condition and decision-points that may occur.    Thank you for this challenging case. Please do not hesitate to call us if any questions.     Case discussed with Medical Ethics Director of Medical Ethics, IVANIA Koehler DNP, [facility]-.    Greater than 50% of this consult spent coordinating care of the patient.    1 Michela AVILA. Deciding life and death in the courtroom. From Gordon to Andres Francis, and Rashid – a brief history and analysis of constitutional protection of the ‘right to die’. ENRIQUE. 1997;278(18):6985-9150 pmid:[id number].   2 Selvin WORTHY, Jessenia WORTHY. Coercive treatments in psychiatry. Bioethics. 2008;22(2);113-120.

## 2020-02-04 NOTE — PROGRESS NOTE ADULT - PROBLEM SELECTOR PLAN 9
Transitions of Care Status:  1.  Name of PCP:  Kristie Nails MD (PCP)  2.  PCP Contacted on Admission: [ ] Y    [x] N    3.  PCP contacted at Discharge: [ ] Y    [ ] N    [ ] N/A  4.  Post-Discharge Appointment Date and Location:  5.  Summary of Handoff given to PCP: - DVT ppx: Lovenox (patient refuses at times)  - Diet: DASH diet w/evening snack  - Dispo: Pending  - ADDENDUM: Spoke to pts family members about decision making.  The children agree that Norris will serve as the point of  and will be the final decision maker representing the family for when decisions have to be made.  They are in agreement with moving forward with sedation/potential intubation for pt to undergo further medical testing. Discussions had on 2/3/20. - Davis Kramer

## 2020-02-04 NOTE — PROGRESS NOTE ADULT - PROBLEM SELECTOR PLAN 6
-  with slight AG (18), BHB 4 (elevated) on presentation; had received insulin prior to presentation  - Per endocrine, lantus 30 + Humalog 9 TID with meals  - Monitor for DKA whenever labs obtained -  with slight AG (18), BHB 4 (elevated) on presentation; had received insulin prior to presentation  - Per endocrine, c/w lantus 30 + Humalog 9 TID with meals - Moderate pulmonary HTN per TTE (1/13)  - Per son, sildenafil was discontinued   - Continue with Toprol 50

## 2020-02-04 NOTE — PROGRESS NOTE ADULT - ATTENDING COMMENTS
Ethics has stated that patient can get LP and MRI brain under anesthesia without patients consent. Case discussed with anesthesia- will arrange for procedures. Curtis Joyner updated daily.    Orquidea Rowland D.O.  Hospitalist Pager # [phone]

## 2020-02-04 NOTE — PROGRESS NOTE ADULT - ASSESSMENT
65 y/o F w/h/o uncontrolled T2DM with unknown complications. Also h/o functional paraplegia, hypothyroidism, pulmonary hypertension, COPD, RA on chronic prednisone, SBO s/p ex lap with lysis of adhesions c/b multifocal pneumonia. Here with AMS/agitation. Pt still with AMS of unclear etiology and lack of capacity per mental health team. Tolerating POs and taking insulin, Synthroid and Prednisone today but refusing other meds. Glycemic control above goal with BG 100s to 300s with prandial hyperglycemia. Will increase Humalog dose. Pt on chronic Prednisone 15mg daily for RA.

## 2020-02-04 NOTE — PROGRESS NOTE ADULT - PROBLEM SELECTOR PLAN 2
- Per Ethics, given that treatment is in the best interest of patient, it is permissible to use chemical and physical restraint to provide such treatment   - Ethics of MRI and LP pending ethics re-eval   - Psych has already declared that patient lacks capacity to refuse medications or life-preserving interventions and ana maría Pisano (HCP) is in agreement w/ plan - Per Ethics, given that treatment is in the best interest of patient, it is permissible to use chemical and physical restraint to provide such treatment   - Psych has already declared that patient lacks capacity to refuse medications or life-preserving interventions and ana maría Pisano (HCP) is in agreement w/ plan  - ethics discussion on MRI/LP as above - Per Ethics, given that treatment is in the best interest of patient, it is permissible to use chemical and physical restraint to provide such treatment   - Psych has already declared that patient lacks capacity to refuse medications or life-preserving interventions and ana maría Pisano (HCP) is in agreement w/ plan  - plan LP and MR w/ contrast; ethics reconsulted on this matter; patient has living will on file that says she is full code and would like full medical therapy; as per ethics conversation on 2/3, this justifies MRI and LP (see note 2/4)

## 2020-02-04 NOTE — PROGRESS NOTE ADULT - PROBLEM SELECTOR PLAN 8
- DVT ppx: Lovenox (patient refuses at times)  - Diet: DASH diet w/evening snack  - Dispo: Pending - DVT ppx: Lovenox (patient refuses at times)  - Diet: DASH diet w/evening snack  - Dispo: Pending  - ADDENDUM: Spoke to pts family members about decision making.  The children agree that Norris will serve as the point of  and will be the final decision maker representing the family for when decisions have to be made.  They are in agreement with moving forward with sedation/potential intubation for pt to undergo further medical testing. Discussions had on 2/3/20. - Davis Kramer - Continue with prednisone 15 mg  - Tramadol PRN

## 2020-02-04 NOTE — PROGRESS NOTE ADULT - PROBLEM SELECTOR PLAN 1
- Unclear etiology though most likely organic; Ddx: Autoimmune encephalitis 2/2 longstanding RA vs Endocrinopathy given abnormal thyroid dysfunction though less likley given correction of TSH/FT4 with minimal change in behavior vs other psychiatric vs infectious etiology  - will plan LP and MR imaging   - LP will likely require IR and MRI will likely require Anesthesia  - Per Psych, Zyprexa 2.5mg BID standing with 2.5 mg PRN agitation + Haldol 2mg IV or IM Q4H for agitation; EKG to assess for QTc - Unclear etiology though most likely organic; Ddx: Autoimmune encephalitis 2/2 longstanding RA vs Endocrinopathy given abnormal thyroid dysfunction though less likley given correction of TSH/FT4 with minimal change in behavior vs other psychiatric vs infectious etiology  - will plan LP and MR imaging; ethics reconsulted on this matter; patient has living will on file that says she is full code and would like full medical therapy; as per ethics conversation on 2/3, this justifies MRI and LP   - LP will likely require IR and MRI will likely require Anesthesia; son Norris and daughter Heavenly aware and agreeable   - Per Psych, Zyprexa 2.5mg BID standing with 2.5 mg PRN agitation + Haldol 2mg IV or IM Q4H for agitation  - patient refusing EKG to assess qtc - Unclear etiology though most likely organic  - ddx: Autoimmune encephalitis 2/2 longstanding RA vs Endocrinopathy given abnormal thyroid dysfunction though less likley given correction of TSH/FT4 with minimal change in behavior vs other psychiatric vs infectious etiology  - will plan LP and MR w/ contrast; ethics reconsulted on this matter; patient has living will on file that says she is full code and would like full medical therapy; as per ethics conversation on 2/3, this justifies MRI and LP (see note 2/4)  - neuro radiology consulted for LP under sedation; will try to coordinate w/ anesthesia for MRI brain (awaiting call back from [phone])  - rheumatology recs appreciated: f/u ALESSIA, ZAFAR, ribosomal P, NMDAR ab; dsdna neg  - Per Psych, Zyprexa 2.5mg BID standing with 2.5 mg PRN agitation + Haldol 2mg IV or IM Q4H for agitation  - patient refusing EKG to assess qtc - Unclear etiology though most likely organic  - ddx: Autoimmune encephalitis 2/2 longstanding RA vs Endocrinopathy given abnormal thyroid dysfunction though less likley given correction of TSH/FT4 with minimal change in behavior vs other psychiatric vs infectious etiology  - will plan LP and MR w/ contrast; ethics reconsulted on this matter; patient has living will on file that says she is full code and would like full medical therapy; as per ethics conversation on 2/3, this justifies MRI and LP (see note 2/4)  - neuro radiology consulted for Xray-LP under sedation; will try to coordinate w/ anesthesia for MRI brain (MRI [id number], LP [id number])  - rheumatology recs appreciated: f/u ALESSIA, ZAFAR, ribosomal P, NMDAR ab; dsdna neg  - Per Psych, Zyprexa 2.5mg BID standing with 2.5 mg PRN agitation + Haldol 2mg IV or IM Q4H for agitation  - patient refusing EKG to assess qtc - Unclear etiology though most likely organic  - ddx: Autoimmune encephalitis 2/2 longstanding RA vs Endocrinopathy given abnormal thyroid dysfunction though less likley given correction of TSH/FT4 with minimal change in behavior vs other psychiatric vs infectious etiology  - neuro radiology consulted for Xray-LP under sedation; will try to coordinate w/ anesthesia for MRI brain; TENTATIVELY PLANNED FOR 2/6 AT 11:45  - rheumatology recs appreciated: f/u ALESSIA, ZAFAR, ribosomal P, NMDAR ab; dsdna neg  - Per Psych, Zyprexa 2.5mg BID standing with 2.5 mg PRN agitation + Haldol 2mg IV or IM Q4H for agitation; patient refusing EKG to assess qtc  - NPO after MN on 12/5, hold dvt ppx after MN on 12/6

## 2020-02-04 NOTE — PROGRESS NOTE ADULT - SUBJECTIVE AND OBJECTIVE BOX
DIABETES FOLLOW UP NOTE: Saw pt earlier today  INTERVAL HX: 65 y/o F w/h/o uncontrolled T2DM with unknown complications. Also h/o functional paraplegia, hypothyroidism, pulmonary hypertension, COPD, RA on chronic prednisone, SBO s/p ex lap with lysis of adhesions c/b multifocal pneumonia. Here with AMS/agitation. Pt still with AMS of unclear etiology and lack of capacity per mental health team. Saw pt this am. Pt complaining about all the staff trying to hurt her and not giving her food or meds. Per staff pt tolerating POs and taking insulin today but having issues taking PO meds. Took Synthroid and Prednisone today but refused other meds. Glycemic control above goal with BG 100s to 300s requiring a total of 11 to 19 units of Humalog ac meals. Pt on chronic Prednisone 15mg daily for RA.          Review of Systems:  Unable due to AMS      Allergies    No Known Allergies    Intolerances    Seroquel (Other)    MEDICATIONS:  insulin glargine Injectable (LANTUS) 30 Unit(s) SubCutaneous every morning  insulin lispro (HumaLOG) corrective regimen sliding scale   SubCutaneous three times a day before meals  insulin lispro (HumaLOG) corrective regimen sliding scale   SubCutaneous at bedtime  insulin lispro Injectable (HumaLOG) 12 Unit(s) SubCutaneous three times a day before meals  levothyroxine 175 MICROGram(s) Oral daily  predniSONE   Tablet 15 milliGRAM(s) Oral daily  simvastatin 20 milliGRAM(s) Oral at bedtime      PHYSICAL EXAM:  VITALS: T(C): --  T(F): --  HR: 80 (02-04-20 @ 17:11) (80 - 98)  BP: 158/87 (02-04-20 @ 06:04) (142/77 - 158/87)  RR:  (16 - 16)  SpO2:  (95% - 100%)  Wt(kg): --  GENERAL: Female laying in bed in NAD  Abdomen: Soft, nontender, non distended, obese  Extremities: Warm, no edema in all 4 exts  NEURO: Alert but with AMS. Displaying paranoid behavior    LABS:  POCT Blood Glucose.: 272 mg/dL (02-04-20 @ 17:29)  POCT Blood Glucose.: 342 mg/dL (02-04-20 @ 13:51)  POCT Blood Glucose.: 168 mg/dL (02-04-20 @ 08:18)  POCT Blood Glucose.: 270 mg/dL (02-03-20 @ 22:42)  POCT Blood Glucose.: 381 mg/dL (02-03-20 @ 17:43)  POCT Blood Glucose.: 247 mg/dL (02-03-20 @ 13:34)  POCT Blood Glucose.: 147 mg/dL (02-03-20 @ 08:30)  POCT Blood Glucose.: 342 mg/dL (02-02-20 @ 16:56)  POCT Blood Glucose.: 319 mg/dL (02-02-20 @ 13:42)                            9.1    10.58 )-----------( 410      ( 01 Feb 2020 20:25 )             29.2       02-01    139  |  106  |  25<H>  ----------------------------<  316<H>  5.3   |  20<L>  |  1.09      EGFR if non : 54<L>    Ca    9.1      02-01  Mg     2.0     02-01  Phos  3.0     02-01    TPro  6.5  /  Alb  3.7  /  TBili  <0.1<L>  /  DBili  x   /  AST  19  /  ALT  20  /  AlkPhos  85  02-01      Thyroid Function Tests:  02-01 @ 22:37 TSH 8.35 FreeT4 1.3 T3 -- Anti TPO -- Anti Thyroglobulin Ab -- TSI --  01-28 @ 17:35 TSH -- FreeT4 0.7 T3 -- Anti TPO -- Anti Thyroglobulin Ab -- TSI --      Hemoglobin A1C, Whole Blood: 9.0 % <H> [4.0 - 5.6] (01-28-20 @ 19:30)  Hemoglobin A1C, Whole Blood: 9.2 % <H> [4.0 - 5.6] (01-27-20 @ 17:36)      01-27 Chol 307<H> <H> HDL 50 Trig 352<H>

## 2020-02-05 DIAGNOSIS — F06.30 MOOD DISORDER DUE TO KNOWN PHYSIOLOGICAL CONDITION, UNSPECIFIED: ICD-10-CM

## 2020-02-05 DIAGNOSIS — F05 DELIRIUM DUE TO KNOWN PHYSIOLOGICAL CONDITION: ICD-10-CM

## 2020-02-05 LAB
ALBUMIN SERPL ELPH-MCNC: 3.9 G/DL — SIGNIFICANT CHANGE UP (ref 3.3–5)
ALP SERPL-CCNC: 101 U/L — SIGNIFICANT CHANGE UP (ref 40–120)
ALT FLD-CCNC: 16 U/L — SIGNIFICANT CHANGE UP (ref 10–45)
ANION GAP SERPL CALC-SCNC: 16 MMOL/L — SIGNIFICANT CHANGE UP (ref 5–17)
APTT BLD: 29.6 SEC — SIGNIFICANT CHANGE UP (ref 27.5–36.3)
AST SERPL-CCNC: 21 U/L — SIGNIFICANT CHANGE UP (ref 10–40)
BASOPHILS # BLD AUTO: 0.09 K/UL — SIGNIFICANT CHANGE UP (ref 0–0.2)
BASOPHILS NFR BLD AUTO: 0.7 % — SIGNIFICANT CHANGE UP (ref 0–2)
BILIRUB SERPL-MCNC: <0.1 MG/DL — LOW (ref 0.2–1.2)
BLD GP AB SCN SERPL QL: NEGATIVE — SIGNIFICANT CHANGE UP
BUN SERPL-MCNC: 27 MG/DL — HIGH (ref 7–23)
CALCIUM SERPL-MCNC: 10 MG/DL — SIGNIFICANT CHANGE UP (ref 8.4–10.5)
CHLORIDE SERPL-SCNC: 102 MMOL/L — SIGNIFICANT CHANGE UP (ref 96–108)
CO2 SERPL-SCNC: 23 MMOL/L — SIGNIFICANT CHANGE UP (ref 22–31)
CREAT SERPL-MCNC: 0.98 MG/DL — SIGNIFICANT CHANGE UP (ref 0.5–1.3)
EOSINOPHIL # BLD AUTO: 0.07 K/UL — SIGNIFICANT CHANGE UP (ref 0–0.5)
EOSINOPHIL NFR BLD AUTO: 0.5 % — SIGNIFICANT CHANGE UP (ref 0–6)
GLUCOSE BLDC GLUCOMTR-MCNC: 175 MG/DL — HIGH (ref 70–99)
GLUCOSE BLDC GLUCOMTR-MCNC: 231 MG/DL — HIGH (ref 70–99)
GLUCOSE BLDC GLUCOMTR-MCNC: 231 MG/DL — HIGH (ref 70–99)
GLUCOSE BLDC GLUCOMTR-MCNC: 281 MG/DL — HIGH (ref 70–99)
GLUCOSE SERPL-MCNC: 260 MG/DL — HIGH (ref 70–99)
HCT VFR BLD CALC: 30.1 % — LOW (ref 34.5–45)
HGB BLD-MCNC: 9.4 G/DL — LOW (ref 11.5–15.5)
IMM GRANULOCYTES NFR BLD AUTO: 0.9 % — SIGNIFICANT CHANGE UP (ref 0–1.5)
INR BLD: 0.91 RATIO — SIGNIFICANT CHANGE UP (ref 0.88–1.16)
LYMPHOCYTES # BLD AUTO: 34 % — SIGNIFICANT CHANGE UP (ref 13–44)
LYMPHOCYTES # BLD AUTO: 4.68 K/UL — HIGH (ref 1–3.3)
MCHC RBC-ENTMCNC: 31.2 GM/DL — LOW (ref 32–36)
MCHC RBC-ENTMCNC: 31.6 PG — SIGNIFICANT CHANGE UP (ref 27–34)
MCV RBC AUTO: 101.3 FL — HIGH (ref 80–100)
MONOCYTES # BLD AUTO: 1.22 K/UL — HIGH (ref 0–0.9)
MONOCYTES NFR BLD AUTO: 8.9 % — SIGNIFICANT CHANGE UP (ref 2–14)
NEUTROPHILS # BLD AUTO: 7.6 K/UL — HIGH (ref 1.8–7.4)
NEUTROPHILS NFR BLD AUTO: 55 % — SIGNIFICANT CHANGE UP (ref 43–77)
NRBC # BLD: 0 /100 WBCS — SIGNIFICANT CHANGE UP (ref 0–0)
PLATELET # BLD AUTO: 473 K/UL — HIGH (ref 150–400)
POTASSIUM SERPL-MCNC: 5.2 MMOL/L — SIGNIFICANT CHANGE UP (ref 3.5–5.3)
POTASSIUM SERPL-SCNC: 5.2 MMOL/L — SIGNIFICANT CHANGE UP (ref 3.5–5.3)
PROT SERPL-MCNC: 7.3 G/DL — SIGNIFICANT CHANGE UP (ref 6–8.3)
PROTHROM AB SERPL-ACNC: 10.4 SEC — SIGNIFICANT CHANGE UP (ref 10–12.9)
RBC # BLD: 2.97 M/UL — LOW (ref 3.8–5.2)
RBC # FLD: 16.7 % — HIGH (ref 10.3–14.5)
RH IG SCN BLD-IMP: POSITIVE — SIGNIFICANT CHANGE UP
SODIUM SERPL-SCNC: 141 MMOL/L — SIGNIFICANT CHANGE UP (ref 135–145)
WBC # BLD: 13.78 K/UL — HIGH (ref 3.8–10.5)
WBC # FLD AUTO: 13.78 K/UL — HIGH (ref 3.8–10.5)

## 2020-02-05 PROCEDURE — 99233 SBSQ HOSP IP/OBS HIGH 50: CPT

## 2020-02-05 PROCEDURE — 99232 SBSQ HOSP IP/OBS MODERATE 35: CPT

## 2020-02-05 PROCEDURE — 99233 SBSQ HOSP IP/OBS HIGH 50: CPT | Mod: GC

## 2020-02-05 RX ORDER — OLANZAPINE 15 MG/1
5 TABLET, FILM COATED ORAL EVERY 4 HOURS
Refills: 0 | Status: DISCONTINUED | OUTPATIENT
Start: 2020-02-05 | End: 2020-02-07

## 2020-02-05 RX ORDER — INSULIN GLARGINE 100 [IU]/ML
20 INJECTION, SOLUTION SUBCUTANEOUS ONCE
Refills: 0 | Status: COMPLETED | OUTPATIENT
Start: 2020-02-05 | End: 2020-02-06

## 2020-02-05 RX ORDER — INSULIN LISPRO 100/ML
15 VIAL (ML) SUBCUTANEOUS
Refills: 0 | Status: DISCONTINUED | OUTPATIENT
Start: 2020-02-05 | End: 2020-02-05

## 2020-02-05 RX ORDER — INSULIN LISPRO 100/ML
VIAL (ML) SUBCUTANEOUS EVERY 6 HOURS
Refills: 0 | Status: DISCONTINUED | OUTPATIENT
Start: 2020-02-06 | End: 2020-02-06

## 2020-02-05 RX ORDER — OLANZAPINE 15 MG/1
5 TABLET, FILM COATED ORAL
Refills: 0 | Status: DISCONTINUED | OUTPATIENT
Start: 2020-02-05 | End: 2020-02-07

## 2020-02-05 RX ADMIN — GABAPENTIN 400 MILLIGRAM(S): 400 CAPSULE ORAL at 01:11

## 2020-02-05 RX ADMIN — Medication 175 MICROGRAM(S): at 05:44

## 2020-02-05 RX ADMIN — Medication 250 MILLIGRAM(S): at 02:17

## 2020-02-05 RX ADMIN — Medication 4: at 18:02

## 2020-02-05 RX ADMIN — Medication 6: at 09:07

## 2020-02-05 RX ADMIN — ENOXAPARIN SODIUM 40 MILLIGRAM(S): 100 INJECTION SUBCUTANEOUS at 12:29

## 2020-02-05 RX ADMIN — TRAMADOL HYDROCHLORIDE 100 MILLIGRAM(S): 50 TABLET ORAL at 02:12

## 2020-02-05 RX ADMIN — BUDESONIDE AND FORMOTEROL FUMARATE DIHYDRATE 2 PUFF(S): 160; 4.5 AEROSOL RESPIRATORY (INHALATION) at 17:44

## 2020-02-05 RX ADMIN — TRAMADOL HYDROCHLORIDE 100 MILLIGRAM(S): 50 TABLET ORAL at 18:47

## 2020-02-05 RX ADMIN — Medication 15 MILLIGRAM(S): at 05:44

## 2020-02-05 RX ADMIN — Medication 3 MILLIGRAM(S): at 01:17

## 2020-02-05 RX ADMIN — PANTOPRAZOLE SODIUM 40 MILLIGRAM(S): 20 TABLET, DELAYED RELEASE ORAL at 05:44

## 2020-02-05 RX ADMIN — Medication 50 MILLIGRAM(S): at 05:44

## 2020-02-05 RX ADMIN — Medication 15 UNIT(S): at 18:02

## 2020-02-05 RX ADMIN — Medication 12 UNIT(S): at 09:07

## 2020-02-05 RX ADMIN — TRAMADOL HYDROCHLORIDE 100 MILLIGRAM(S): 50 TABLET ORAL at 22:36

## 2020-02-05 RX ADMIN — INSULIN GLARGINE 30 UNIT(S): 100 INJECTION, SOLUTION SUBCUTANEOUS at 09:08

## 2020-02-05 RX ADMIN — GABAPENTIN 400 MILLIGRAM(S): 400 CAPSULE ORAL at 18:03

## 2020-02-05 RX ADMIN — TRAMADOL HYDROCHLORIDE 100 MILLIGRAM(S): 50 TABLET ORAL at 18:06

## 2020-02-05 RX ADMIN — Medication 4: at 12:28

## 2020-02-05 RX ADMIN — TRAMADOL HYDROCHLORIDE 100 MILLIGRAM(S): 50 TABLET ORAL at 01:17

## 2020-02-05 RX ADMIN — GABAPENTIN 400 MILLIGRAM(S): 400 CAPSULE ORAL at 09:08

## 2020-02-05 RX ADMIN — Medication 12 UNIT(S): at 12:28

## 2020-02-05 RX ADMIN — BUDESONIDE AND FORMOTEROL FUMARATE DIHYDRATE 2 PUFF(S): 160; 4.5 AEROSOL RESPIRATORY (INHALATION) at 05:32

## 2020-02-05 RX ADMIN — OLANZAPINE 2.5 MILLIGRAM(S): 15 TABLET, FILM COATED ORAL at 05:44

## 2020-02-05 NOTE — PROGRESS NOTE ADULT - PROBLEM SELECTOR PLAN 5
- Bcx + for GPR on 1/12, 1 out of 2 bottles   -+Bcx from 1/12 sent to [facility] laboratories, currently no results  - Blood cx obtained on 1/25 no growth X 5 days.  - Will monitor off Abx - Bcx + for GPR on 1/12, 1 out of 2 bottles   -+Bcx from 1/12 sent to [facility] laboratories, currently no results  - Blood cx obtained on 1/25 no growth X 5 days.  - Will monitor off Abx for now as w/out fever and leukocytosis

## 2020-02-05 NOTE — PROGRESS NOTE BEHAVIORAL HEALTH - NSBHCONSULTRECOMMENDOTHER_PSY_A_CORE FT
1)  consider raising zyprexa to 5 mg po BID for mood stability/paranoia and agitation; f/u QTc < 500  2) for acute agitation, continue haldol 2 mg IV Q4hr PRN, f/u QTc < 500  3) pt's confusion likely metabolic in nature, f/u MRI brain and LP, consider paraneoplastic panel; during sedation for MRI, consider EKG for QTc prolongation  4) pt lacks capacity to refuse treatments, defer to pt's HCP   5) pt cannot leave AMA

## 2020-02-05 NOTE — PROGRESS NOTE ADULT - SUBJECTIVE AND OBJECTIVE BOX
***************************************************************  Dr. Kellie Bernal  Internal Medicine   [facility] Pager: [phone]  [facility] Pager: [phone]   ***************************************************************    AFTAB BASS  64y  MRN: [id number]    Subjective:    Patient is a 64y old  Female who presents with a chief complaint of change in mental status (04 Feb 2020 17:41)      Interval history/overnight events:      MEDICATIONS  (STANDING):  budesonide  80 MICROgram(s)/formoterol 4.5 MICROgram(s) Inhaler 2 Puff(s) Inhalation two times a day  dextrose 5%. 1000 milliLiter(s) (50 mL/Hr) IV Continuous <Continuous>  dextrose 50% Injectable 12.5 Gram(s) IV Push once  dextrose 50% Injectable 25 Gram(s) IV Push once  dextrose 50% Injectable 25 Gram(s) IV Push once  enoxaparin Injectable 40 milliGRAM(s) SubCutaneous daily  gabapentin 400 milliGRAM(s) Oral three times a day  influenza   Vaccine 0.5 milliLiter(s) IntraMuscular once  insulin glargine Injectable (LANTUS) 30 Unit(s) SubCutaneous every morning  insulin lispro (HumaLOG) corrective regimen sliding scale   SubCutaneous three times a day before meals  insulin lispro (HumaLOG) corrective regimen sliding scale   SubCutaneous at bedtime  insulin lispro Injectable (HumaLOG) 12 Unit(s) SubCutaneous three times a day before meals  levothyroxine 175 MICROGram(s) Oral daily  metoprolol succinate ER 50 milliGRAM(s) Oral daily  OLANZapine 2.5 milliGRAM(s) Oral two times a day  pantoprazole    Tablet 40 milliGRAM(s) Oral before breakfast  predniSONE   Tablet 15 milliGRAM(s) Oral daily  primidone 50 milliGRAM(s) Oral at bedtime  simvastatin 20 milliGRAM(s) Oral at bedtime    MEDICATIONS  (PRN):  acetaminophen   Tablet .. 650 milliGRAM(s) Oral every 6 hours PRN Temp greater or equal to 38C (100.4F), Mild Pain (1 - 3), Moderate Pain (4 - 6)  albuterol/ipratropium for Nebulization 3 milliLiter(s) Nebulizer every 6 hours PRN Shortness of Breath and/or Wheezing  dextrose 40% Gel 15 Gram(s) Oral once PRN Blood Glucose LESS THAN 70 milliGRAM(s)/deciLiter  glucagon  Injectable 1 milliGRAM(s) IntraMuscular once PRN Glucose <70 milliGRAM(s)/deciLiter  haloperidol    Injectable 2 milliGRAM(s) IntraMuscular every 4 hours PRN Agitation  haloperidol    Injectable 2 milliGRAM(s) IV Push every 4 hours PRN Agitation  melatonin 3 milliGRAM(s) Oral at bedtime PRN Insomnia  naproxen 250 milliGRAM(s) Oral every 6 hours PRN Moderate Pain (4 - 6)  OLANZapine Injectable 2.5 milliGRAM(s) IntraMuscular every 4 hours PRN agitation  traMADol 100 milliGRAM(s) Oral three times a day PRN Severe Pain (7 - 10)        Objective:    Vitals: Vital Signs Last 24 Hrs  T(C): --  T(F): --  HR: 82 (02-05-20 @ 05:32) (80 - 109)  BP: 158/94 (02-05-20 @ 05:14) (146/87 - 158/94)  BP(mean): --  RR: 18 (02-05-20 @ 05:14) (16 - 18)  SpO2: 98% (02-05-20 @ 05:32) (96% - 100%)            I&O's Summary      PHYSICAL EXAM:  GENERAL: NAD  HEENT: PERRL, no scleral icterus, no head and neck lad   CHEST/LUNG: CTAB, no wheezing, crackles, or ronchi   HEART: RRR, normal S1, S2, no murmurs, gallops, or rubs appreciated   ABDOMEN: soft, nondistended, non-tender, normoactive, no HSM, no rebound, no guarding, no rigidity  SKIN: No rashes or lesions  NERVOUS SYSTEM: Alert & Oriented X3  PSYCH: calm and cooperative     LABS:                              CAPILLARY BLOOD GLUCOSE      POCT Blood Glucose.: 212 mg/dL (04 Feb 2020 21:47)  POCT Blood Glucose.: 272 mg/dL (04 Feb 2020 17:29)  POCT Blood Glucose.: 342 mg/dL (04 Feb 2020 13:51)  POCT Blood Glucose.: 168 mg/dL (04 Feb 2020 08:18)          RADIOLOGY & ADDITIONAL TESTS:            Imaging Personally Reviewed:  [ ] YES  [ ] NO    Consultants involved in case:   Consultant(s) Notes Reviewed:  [ ] YES  [ ] NO:   Care Discussed with Consultants/Other Providers [ ] YES  [ ] NO ***************************************************************  Dr. Kellie Bernal  Internal Medicine   [facility] Pager: [phone]  [facility] Pager: [phone]   ***************************************************************    AFTAB BASS  64y  MRN: [id number]    Subjective:    Patient is a 64y old  Female who presents with a chief complaint of change in mental status (04 Feb 2020 17:41)      Interval history/overnight events:    FLORA ON. Again agitated, delirious this AM. Refusing exam. ROS could not be addressed.       MEDICATIONS  (STANDING):  budesonide  80 MICROgram(s)/formoterol 4.5 MICROgram(s) Inhaler 2 Puff(s) Inhalation two times a day  dextrose 5%. 1000 milliLiter(s) (50 mL/Hr) IV Continuous <Continuous>  dextrose 50% Injectable 12.5 Gram(s) IV Push once  dextrose 50% Injectable 25 Gram(s) IV Push once  dextrose 50% Injectable 25 Gram(s) IV Push once  enoxaparin Injectable 40 milliGRAM(s) SubCutaneous daily  gabapentin 400 milliGRAM(s) Oral three times a day  influenza   Vaccine 0.5 milliLiter(s) IntraMuscular once  insulin glargine Injectable (LANTUS) 30 Unit(s) SubCutaneous every morning  insulin lispro (HumaLOG) corrective regimen sliding scale   SubCutaneous three times a day before meals  insulin lispro (HumaLOG) corrective regimen sliding scale   SubCutaneous at bedtime  insulin lispro Injectable (HumaLOG) 12 Unit(s) SubCutaneous three times a day before meals  levothyroxine 175 MICROGram(s) Oral daily  metoprolol succinate ER 50 milliGRAM(s) Oral daily  OLANZapine 2.5 milliGRAM(s) Oral two times a day  pantoprazole    Tablet 40 milliGRAM(s) Oral before breakfast  predniSONE   Tablet 15 milliGRAM(s) Oral daily  primidone 50 milliGRAM(s) Oral at bedtime  simvastatin 20 milliGRAM(s) Oral at bedtime    MEDICATIONS  (PRN):  acetaminophen   Tablet .. 650 milliGRAM(s) Oral every 6 hours PRN Temp greater or equal to 38C (100.4F), Mild Pain (1 - 3), Moderate Pain (4 - 6)  albuterol/ipratropium for Nebulization 3 milliLiter(s) Nebulizer every 6 hours PRN Shortness of Breath and/or Wheezing  dextrose 40% Gel 15 Gram(s) Oral once PRN Blood Glucose LESS THAN 70 milliGRAM(s)/deciLiter  glucagon  Injectable 1 milliGRAM(s) IntraMuscular once PRN Glucose <70 milliGRAM(s)/deciLiter  haloperidol    Injectable 2 milliGRAM(s) IntraMuscular every 4 hours PRN Agitation  haloperidol    Injectable 2 milliGRAM(s) IV Push every 4 hours PRN Agitation  melatonin 3 milliGRAM(s) Oral at bedtime PRN Insomnia  naproxen 250 milliGRAM(s) Oral every 6 hours PRN Moderate Pain (4 - 6)  OLANZapine Injectable 2.5 milliGRAM(s) IntraMuscular every 4 hours PRN agitation  traMADol 100 milliGRAM(s) Oral three times a day PRN Severe Pain (7 - 10)        Objective:    Vitals: Vital Signs Last 24 Hrs  T(C): --  T(F): --  HR: 82 (02-05-20 @ 05:32) (80 - 109)  BP: 158/94 (02-05-20 @ 05:14) (146/87 - 158/94)  BP(mean): --  RR: 18 (02-05-20 @ 05:14) (16 - 18)  SpO2: 98% (02-05-20 @ 05:32) (96% - 100%)            I&O's Summary      LABS:  PHYSICAL EXAM: declining full exam   GENERAL: elderly female, agitated   HEENT: PERRL, no scleral icterus, no head and neck lad   CHEST/LUNG: not assessed   HEART: Not assessed   ABDOMEN: Not assessed   SKIN: skin thickening over lower abdomen at eviscerated SBO site; patient refusing further eval   NERVOUS SYSTEM: Alert & Oriented X3   PSYCH: agitated, delirious     CAPILLARY BLOOD GLUCOSE      POCT Blood Glucose.: 212 mg/dL (04 Feb 2020 21:47)  POCT Blood Glucose.: 272 mg/dL (04 Feb 2020 17:29)  POCT Blood Glucose.: 342 mg/dL (04 Feb 2020 13:51)  POCT Blood Glucose.: 168 mg/dL (04 Feb 2020 08:18)          RADIOLOGY & ADDITIONAL TESTS:            Imaging Personally Reviewed:  [ ] YES  [ ] NO    Consultants involved in case:   Consultant(s) Notes Reviewed:  [ ] YES  [ ] NO:   Care Discussed with Consultants/Other Providers [ ] YES  [ ] NO

## 2020-02-05 NOTE — PROGRESS NOTE ADULT - PROBLEM SELECTOR PLAN 2
-Test BG ac and hs  -Change Lantus dose to 32 units q am  -Increase Humalog to 15 units ac meals. Will adjust as needed. Make sure pt eats. Hold if pt not eating  -c/w Humalog moderate correction scale since pt is on Prednisone.  --Pt unable to care for self at this time. Pt will need to continue basal/bolus therapy upon discharge.  -Plan discussed with pt's team.  Contact info: [phone] (24/7). pager [phone]

## 2020-02-05 NOTE — PROGRESS NOTE ADULT - PROBLEM SELECTOR PLAN 7
-  with slight AG (18), BHB 4 (elevated) on presentation  - hospital course complicated by FBGs in 300s  - Per endocrine, c/w lantus 30 + Humalog 12 TID with meals   - c/w -  with slight AG (18), BHB 4 (elevated) on presentation  - hospital course complicated by FBGs in 300s  - Per endocrine, c/w lantus 30 + Humalog 12 TID with meals, mod ISS since on steroids   - c/w -  with slight AG (18), BHB 4 (elevated) on presentation  - hospital course complicated by FBGs in 300s; in acceptable range when not refusing insulin  - Per endocrine, c/w lantus 30 + Humalog 12 TID with meals, mod ISS since on steroids

## 2020-02-05 NOTE — PROGRESS NOTE ADULT - ASSESSMENT
64F w/ a PMHx arthritis, hypothyroidism, pulmonary HTN, DM on insulin, COPD/CHRIS on noctournal CPAP and 3L home), RA on chronic prednisone, chronic tremors, SBO s/p ex lap with lysis of adhesions c/b evisceration 2/2 a coughing episode s/p repeat ex lap c/b multifocal pneumonia and hypercapnic respiratory failure requiring intubation 1/6/19, s/p bronch 1/9/19, and extubated on 1/24/19; further c/b Enterococci bacteremia), p/w agitation 3 weeks of unclear etiology, found to be significantly hypothyroid (), extremely combative and paranoid of family and healthcare staff, course c/b hyperglycemia

## 2020-02-05 NOTE — PROGRESS NOTE ADULT - ASSESSMENT
63 y/o F w/h/o uncontrolled T2DM with unknown complications. Also h/o functional paraplegia, hypothyroidism, pulmonary hypertension, COPD, RA on chronic prednisone, SBO s/p ex lap with lysis of adhesions c/b multifocal pneumonia. Here with AMS/agitation. Pt still with AMS of unclear etiology and lack of capacity per mental health team. Tolerating POs and taking insulin, Synthroid and Prednisone today but refusing other meds on and off. Glycemic control remains above goal with BG mostly in 200s today. Pt on chronic Prednisone 15mg daily for RA. No hypoglycemia. Will increase insulin doses to BG goal 100s to 180s. 63 y/o F w/h/o uncontrolled T2DM with unknown complications. Also h/o functional paraplegia, hypothyroidism, pulmonary hypertension, COPD, RA on chronic prednisone, SBO s/p ex lap with lysis of adhesions c/b multifocal pneumonia. Here with AMS/agitation. Pt still with AMS of unclear etiology and lack of capacity per mental health team. Tolerating POs and taking insulin, Synthroid and Prednisone today but refusing other meds on and off. Glycemic control remains above goal with BG mostly in 200s today. Pt on chronic Prednisone 15mg daily for RA. No hypoglycemia. Will increase insulin doses to BG goal 100s to 180s.   Spoke to team about NPO order after MN, per team pt going for multiple tests including MRI and LP tomorrow under sedation. Primary team adjusted insulin doses for NPO status. Agreed with recs.

## 2020-02-05 NOTE — PROGRESS NOTE ADULT - PROBLEM SELECTOR PLAN 2
- Per Ethics, given that treatment is in the best interest of patient, it is permissible to use chemical and physical restraint to provide such treatment   - Psych has already declared that patient lacks capacity to refuse medications or life-preserving interventions and ana maría Pisano (HCP) is in agreement w/ plan  - plan LP and MR w/ contrast; ethics reconsulted on this matter; patient has living will on file that says she is full code and would like full medical therapy; as per ethics conversation on 2/3, this justifies MRI and LP (see note 2/4)

## 2020-02-05 NOTE — PROGRESS NOTE ADULT - ATTENDING COMMENTS
Patient agreed to labs today. to have MRI brain/ MRV brain/ MRA brain tomorrow then LP under anesthesia. Will increase zyprexa to 5 mg bid per discussion with psychiatry. To obtain EKG when patient is under anesthesia (currently refuses).     Orquidea Rowland D.O.  Hospitalist Pager # [phone]

## 2020-02-05 NOTE — PROGRESS NOTE ADULT - PROBLEM SELECTOR PLAN 9
- DVT ppx: Lovenox (patient refuses at times)  - Diet: DASH diet w/evening snack  - Dispo: Pending  - ADDENDUM: Spoke to pts family members about decision making.  The children agree that Norris will serve as the point of  and will be the final decision maker representing the family for when decisions have to be made.  They are in agreement with moving forward with sedation/potential intubation for pt to undergo further medical testing. Discussions had on 2/3/20. - Davis Kramer - DVT ppx: Lovenox (patient refuses at times); hold after MN on 2/6  - Diet: DASH diet w/evening snack  - Dispo: Pending  - ADDENDUM: Spoke to pts family members about decision making.  The children agree that Norris will serve as the point of  and will be the final decision maker representing the family for when decisions have to be made.  They are in agreement with moving forward with sedation/potential intubation for pt to undergo further medical testing. Discussions had on 2/3/20. - Davis Kramer

## 2020-02-05 NOTE — PROGRESS NOTE ADULT - SUBJECTIVE AND OBJECTIVE BOX
DIABETES FOLLOW UP NOTE: Saw pt earlier today  INTERVAL HX: 63 y/o F w/h/o uncontrolled T2DM with unknown complications. Also h/o functional paraplegia, hypothyroidism, pulmonary hypertension, COPD, RA on chronic prednisone, SBO s/p ex lap with lysis of adhesions c/b multifocal pneumonia. Here with AMS/agitation. Pt still with AMS of unclear etiology and lack of capacity per mental health team. Tolerating POs and taking insulin, Synthroid and Prednisone today but refusing other meds on and off. Glycemic control remains above goal with BG mostly in 200s today. Pt on chronic Prednisone 15mg daily for RA.      Review of Systems:  Confused disoriented. paramoid     Allergies    No Known Allergies    Intolerances    Seroquel (Other)    MEDICATIONS:  insulin glargine Injectable (LANTUS) 20 Unit(s) SubCutaneous once  insulin lispro (HumaLOG) corrective regimen sliding scale   SubCutaneous three times a day before meals  insulin lispro (HumaLOG) corrective regimen sliding scale   SubCutaneous at bedtime  insulin lispro Injectable (HumaLOG) 12 Unit(s) SubCutaneous three times a day before meals  levothyroxine 175 MICROGram(s) Oral daily  predniSONE   Tablet 15 milliGRAM(s) Oral daily  simvastatin 20 milliGRAM(s) Oral at bedtime      PHYSICAL EXAM:  VITALS: T(C): --  T(F): --  HR: 82 (02-05-20 @ 05:32) (82 - 109)  BP: 158/94 (02-05-20 @ 05:14) (146/87 - 158/94)  RR:  (16 - 18)  SpO2:  (96% - 100%)  Wt(kg): --  GENERAL: Female laying in bed in NAD. Sister at bedside. Staff trying to assist with AM care but pt not cooperating.  Abdomen: Soft, nontender, non distended  Extremities: Warm, no edema in all 4 exts  NEURO: Alert but with AMS. Displaying paranoid behavior      LABS:  POCT Blood Glucose.: 231 mg/dL (02-05-20 @ 12:28)  POCT Blood Glucose.: 281 mg/dL (02-05-20 @ 09:04)  POCT Blood Glucose.: 212 mg/dL (02-04-20 @ 21:47)  POCT Blood Glucose.: 272 mg/dL (02-04-20 @ 17:29)  POCT Blood Glucose.: 342 mg/dL (02-04-20 @ 13:51)  POCT Blood Glucose.: 168 mg/dL (02-04-20 @ 08:18)  POCT Blood Glucose.: 270 mg/dL (02-03-20 @ 22:42)  POCT Blood Glucose.: 381 mg/dL (02-03-20 @ 17:43)  POCT Blood Glucose.: 247 mg/dL (02-03-20 @ 13:34)  POCT Blood Glucose.: 147 mg/dL (02-03-20 @ 08:30)                            9.4    13.78 )-----------( 473      ( 05 Feb 2020 16:47 )             30.1       02-05    141  |  102  |  27<H>  ----------------------------<  260<H>  5.2   |  23  |  0.98    EGFR if : 72      Ca    10.0      02-05    TPro  7.3  /  Alb  3.9  /  TBili  <0.1<L>  /  DBili  x   /  AST  21  /  ALT  16  /  AlkPhos  101  02-05      Thyroid Function Tests:  02-01 @ 22:37 TSH 8.35 FreeT4 1.3 T3 -- Anti TPO -- Anti Thyroglobulin Ab -- TSI --  01-28 @ 17:35 TSH -- FreeT4 0.7 T3 -- Anti TPO -- Anti Thyroglobulin Ab -- TSI --      Hemoglobin A1C, Whole Blood: 9.0 % <H> [4.0 - 5.6] (01-28-20 @ 19:30)  Hemoglobin A1C, Whole Blood: 9.2 % <H> [4.0 - 5.6] (01-27-20 @ 17:36)      01-27 Chol 307<H> <H> HDL 50 Trig 352<H>

## 2020-02-05 NOTE — PROGRESS NOTE ADULT - PROBLEM SELECTOR PLAN 1
-Her TSH levels have not been consistent which may be due to assay interference:  1/22 TSH 29  1/25   1/27 TSH 52.3  2/3  TSH 8.35  -C/w Synthroid 175mcg po q daily on an empty stomach!!  -Recheck TSH in 40-6 weeks   -Plan discussed with pt/team.  Contact info: [phone] (24/7). pager [phone]

## 2020-02-05 NOTE — PROGRESS NOTE ADULT - PROBLEM SELECTOR PLAN 4
Patient w/ anterior abdominal wound from SBO revision   - wound care following   - patient refusing dressing changes per nursing and exam by physician   - s/p dressing change w/ ana maría Pisano at bedside 2/3   - risk of infection discussed with ana maría Pisano  - CTM for s&s of infection Patient w/ anterior abdominal wound from SBO revision   - wound care following    - patient refusing dressing changes per nursing and exam by physician   - s/p dressing change w/ ana maría Pisano at bedside 2/3   - risk of infection discussed with ana maría Pisano  - CTM for s&s of infection

## 2020-02-05 NOTE — PROGRESS NOTE ADULT - PROBLEM SELECTOR PLAN 1
- Unclear etiology though most likely organic  - ddx: Autoimmune encephalitis 2/2 longstanding RA vs Endocrinopathy given abnormal thyroid dysfunction though less likley given correction of TSH/FT4 with minimal change in behavior vs other psychiatric vs infectious etiology  - neuro radiology consulted for Xray-LP under sedation; will try to coordinate w/ anesthesia for MRI brain; TENTATIVELY PLANNED FOR 2/6 AT 11:45  - rheumatology recs appreciated: f/u ALESSIA, ZAFAR, ribosomal P, NMDAR ab; dsdna neg  - Per Psych, Zyprexa 2.5mg BID standing with 2.5 mg PRN agitation + Haldol 2mg IV or IM Q4H for agitation; patient refusing EKG to assess qtc  - NPO after MN on 12/5, hold dvt ppx after MN on 12/6 - Unclear etiology though most likely organic  - ddx: Autoimmune encephalitis 2/2 longstanding RA vs Endocrinopathy given abnormal thyroid dysfunction though less likely given correction of TSH/FT4 with minimal change in behavior vs other psychiatric vs infectious etiology  - MR brain w/ sedation followed by Xray-LP under sedation planned for 2/6 AT 11:45  - rheumatology recs appreciated: ALESSIA, ZAFAR, ribosomal P, dsdna neg; NMDRA pending   - psych recs aprpeciated: inc zyprexa to 5 from 2.5mg BID standing with 5 mg PRN agitation + Haldol 2mg IV or IM Q4H for agitation; patient refusing EKG to assess qtc  - NPO after MN on 12/6, hold dvt ppx after MN on 12/6 - Unclear etiology though most likely organic  - ddx: Autoimmune encephalitis 2/2 longstanding RA vs Endocrinopathy given abnormal thyroid dysfunction though less likely given correction of TSH/FT4 with minimal change in behavior vs other psychiatric vs infectious etiology  - MR brain, MRA head and neck, MRV w/ sedation followed by Xray-LP under sedation planned for 2/6 AT 11:45  - rheumatology recs appreciated: ALESSIA, ZAFAR, ribosomal P, dsdna neg; NMDRA pending   - psych recs aprpeciated: inc zyprexa to 5 from 2.5mg BID standing with 5 mg PRN agitation + Haldol 2mg IV or IM Q4H for agitation; patient refusing EKG to assess qtc  - NPO after MN on 12/6, hold dvt ppx after MN on 12/6

## 2020-02-05 NOTE — PROGRESS NOTE ADULT - PROBLEM SELECTOR PLAN 3
-  on admission; most recent TSH 8.35 2/1  - c/w 175 mg as per Endocrine, more proper weight based regimen   - Endocrinology following -  on admission; most recent TSH 8.35 2/1  - anti-TPO elevated c/w autoimmune thyroiditis   - c/w 175 mg as per Endocrine, more proper weight based regimen   - Endocrinology following

## 2020-02-06 LAB
APPEARANCE CSF: CLEAR — SIGNIFICANT CHANGE UP
APPEARANCE CSF: CLEAR — SIGNIFICANT CHANGE UP
APPEARANCE SPUN FLD: COLORLESS — SIGNIFICANT CHANGE UP
APPEARANCE SPUN FLD: COLORLESS — SIGNIFICANT CHANGE UP
COLOR CSF: SIGNIFICANT CHANGE UP
COLOR CSF: SIGNIFICANT CHANGE UP
CSF PCR RESULT: SIGNIFICANT CHANGE UP
GLUCOSE BLDC GLUCOMTR-MCNC: 185 MG/DL — HIGH (ref 70–99)
GLUCOSE BLDC GLUCOMTR-MCNC: 248 MG/DL — HIGH (ref 70–99)
GLUCOSE CSF-MCNC: 128 MG/DL — HIGH (ref 40–70)
GRAM STN FLD: SIGNIFICANT CHANGE UP
LABORATORY COMMENT REPORT: SIGNIFICANT CHANGE UP
LDH CSF L TO P-CCNC: 22 U/L — SIGNIFICANT CHANGE UP
LDH FLD-CCNC: 22 U/L — SIGNIFICANT CHANGE UP
LYMPHOCYTES # CSF: 67 % — SIGNIFICANT CHANGE UP (ref 40–80)
LYMPHOCYTES # CSF: 76 % — SIGNIFICANT CHANGE UP (ref 40–80)
MONOS+MACROS NFR CSF: 23 % — SIGNIFICANT CHANGE UP (ref 15–45)
MONOS+MACROS NFR CSF: 31 % — SIGNIFICANT CHANGE UP (ref 15–45)
NEUTROPHILS # CSF: 1 % — SIGNIFICANT CHANGE UP (ref 0–6)
NEUTROPHILS # CSF: 2 % — SIGNIFICANT CHANGE UP (ref 0–6)
NRBC NFR CSF: 1 /UL — SIGNIFICANT CHANGE UP (ref 0–5)
NRBC NFR CSF: 1 /UL — SIGNIFICANT CHANGE UP (ref 0–5)
PROT CSF-MCNC: 48 MG/DL — HIGH (ref 15–45)
RBC # CSF: 1 /UL — HIGH (ref 0–0)
RBC # CSF: 1 /UL — HIGH (ref 0–0)
SOURCE HSV 1/2: SIGNIFICANT CHANGE UP
SPECIMEN SOURCE: SIGNIFICANT CHANGE UP
TUBE TYPE: SIGNIFICANT CHANGE UP
TUBE TYPE: SIGNIFICANT CHANGE UP

## 2020-02-06 PROCEDURE — 99233 SBSQ HOSP IP/OBS HIGH 50: CPT | Mod: GC

## 2020-02-06 PROCEDURE — 93010 ELECTROCARDIOGRAM REPORT: CPT

## 2020-02-06 PROCEDURE — 99232 SBSQ HOSP IP/OBS MODERATE 35: CPT

## 2020-02-06 PROCEDURE — 70546 MR ANGIOGRAPH HEAD W/O&W/DYE: CPT | Mod: 26

## 2020-02-06 PROCEDURE — 70549 MR ANGIOGRAPH NECK W/O&W/DYE: CPT | Mod: 26

## 2020-02-06 PROCEDURE — 70552 MRI BRAIN STEM W/DYE: CPT | Mod: 26,59

## 2020-02-06 PROCEDURE — 99221 1ST HOSP IP/OBS SF/LOW 40: CPT

## 2020-02-06 PROCEDURE — 88188 FLOWCYTOMETRY/READ 9-15: CPT

## 2020-02-06 RX ORDER — INSULIN GLARGINE 100 [IU]/ML
32 INJECTION, SOLUTION SUBCUTANEOUS EVERY MORNING
Refills: 0 | Status: DISCONTINUED | OUTPATIENT
Start: 2020-02-06 | End: 2020-02-07

## 2020-02-06 RX ORDER — INSULIN LISPRO 100/ML
VIAL (ML) SUBCUTANEOUS AT BEDTIME
Refills: 0 | Status: DISCONTINUED | OUTPATIENT
Start: 2020-02-06 | End: 2020-02-15

## 2020-02-06 RX ORDER — INSULIN LISPRO 100/ML
VIAL (ML) SUBCUTANEOUS
Refills: 0 | Status: DISCONTINUED | OUTPATIENT
Start: 2020-02-06 | End: 2020-02-15

## 2020-02-06 RX ORDER — INSULIN LISPRO 100/ML
15 VIAL (ML) SUBCUTANEOUS
Refills: 0 | Status: DISCONTINUED | OUTPATIENT
Start: 2020-02-06 | End: 2020-02-07

## 2020-02-06 RX ADMIN — SIMVASTATIN 20 MILLIGRAM(S): 20 TABLET, FILM COATED ORAL at 21:58

## 2020-02-06 RX ADMIN — Medication 15 MILLIGRAM(S): at 09:10

## 2020-02-06 RX ADMIN — Medication 175 MICROGRAM(S): at 09:10

## 2020-02-06 RX ADMIN — GABAPENTIN 400 MILLIGRAM(S): 400 CAPSULE ORAL at 01:38

## 2020-02-06 RX ADMIN — INSULIN GLARGINE 20 UNIT(S): 100 INJECTION, SOLUTION SUBCUTANEOUS at 09:09

## 2020-02-06 RX ADMIN — OLANZAPINE 5 MILLIGRAM(S): 15 TABLET, FILM COATED ORAL at 16:28

## 2020-02-06 RX ADMIN — GABAPENTIN 400 MILLIGRAM(S): 400 CAPSULE ORAL at 21:59

## 2020-02-06 RX ADMIN — OLANZAPINE 5 MILLIGRAM(S): 15 TABLET, FILM COATED ORAL at 21:58

## 2020-02-06 RX ADMIN — OLANZAPINE 5 MILLIGRAM(S): 15 TABLET, FILM COATED ORAL at 09:10

## 2020-02-06 RX ADMIN — GABAPENTIN 400 MILLIGRAM(S): 400 CAPSULE ORAL at 09:30

## 2020-02-06 RX ADMIN — Medication 4: at 16:46

## 2020-02-06 RX ADMIN — PANTOPRAZOLE SODIUM 40 MILLIGRAM(S): 20 TABLET, DELAYED RELEASE ORAL at 09:11

## 2020-02-06 RX ADMIN — Medication 50 MILLIGRAM(S): at 09:10

## 2020-02-06 RX ADMIN — PRIMIDONE 50 MILLIGRAM(S): 250 TABLET ORAL at 21:58

## 2020-02-06 NOTE — PROGRESS NOTE ADULT - SUBJECTIVE AND OBJECTIVE BOX
Contact info:   Sid Saul MD  pager [phone], please provide 10 digit call back number.   Please note that this patient may be followed by another provider tomorrow.   If no answer or after hours, please contact [phone]    Subjective: Patient remains to be in combative state.  She is very upset that they forced her to do an MRI today.  She thought she was forced to have surgery performed on.  This is drastically different than her baseline as I am very familiar with the patient.  I have seen her in outpatient.     MEDICATIONS  (STANDING):  budesonide  80 MICROgram(s)/formoterol 4.5 MICROgram(s) Inhaler 2 Puff(s) Inhalation two times a day  gabapentin 400 milliGRAM(s) Oral three times a day  influenza   Vaccine 0.5 milliLiter(s) IntraMuscular once  insulin glargine Injectable (LANTUS) 32 Unit(s) SubCutaneous every morning  insulin lispro (HumaLOG) corrective regimen sliding scale   SubCutaneous three times a day before meals  insulin lispro (HumaLOG) corrective regimen sliding scale   SubCutaneous at bedtime  insulin lispro Injectable (HumaLOG) 15 Unit(s) SubCutaneous three times a day with meals  levothyroxine 175 MICROGram(s) Oral daily  metoprolol succinate ER 50 milliGRAM(s) Oral daily  OLANZapine 5 milliGRAM(s) Oral two times a day  pantoprazole    Tablet 40 milliGRAM(s) Oral before breakfast  predniSONE   Tablet 15 milliGRAM(s) Oral daily  primidone 50 milliGRAM(s) Oral at bedtime  simvastatin 20 milliGRAM(s) Oral at bedtime    MEDICATIONS  (PRN):  acetaminophen   Tablet .. 650 milliGRAM(s) Oral every 6 hours PRN Temp greater or equal to 38C (100.4F), Mild Pain (1 - 3), Moderate Pain (4 - 6)  albuterol/ipratropium for Nebulization 3 milliLiter(s) Nebulizer every 6 hours PRN Shortness of Breath and/or Wheezing  dextrose 40% Gel 15 Gram(s) Oral once PRN Blood Glucose LESS THAN 70 milliGRAM(s)/deciLiter  glucagon  Injectable 1 milliGRAM(s) IntraMuscular once PRN Glucose <70 milliGRAM(s)/deciLiter  haloperidol    Injectable 2 milliGRAM(s) IntraMuscular every 4 hours PRN Agitation  haloperidol    Injectable 2 milliGRAM(s) IV Push every 4 hours PRN Agitation  melatonin 3 milliGRAM(s) Oral at bedtime PRN Insomnia  naproxen 250 milliGRAM(s) Oral every 6 hours PRN Moderate Pain (4 - 6)  OLANZapine Injectable 5 milliGRAM(s) IntraMuscular every 4 hours PRN agitation  traMADol 100 milliGRAM(s) Oral three times a day PRN Severe Pain (7 - 10)      Allergies    No Known Allergies    Intolerances    Seroquel (Other)    Review of Systems:  unable to obtain.     PHYSICAL EXAM:  VITALS: T(C): 36.4 (02-06-20 @ 15:20)  T(F): 97.5 (02-06-20 @ 15:20), Max: 97.5 (02-06-20 @ 15:20)  HR: 110 (02-06-20 @ 15:45) (71 - 110)  BP: 188/96 (02-06-20 @ 15:45) (158/91 - 188/96)  RR:  (13 - 17)  SpO2:  (93% - 96%)  Wt(kg): --    GENERAL: Female laying in bed in NAD. Sister at bedside. Staff trying to assist with AM care but pt not cooperating.  Abdomen: Soft, nontender, non distended  Extremities: Warm, no edema in all 4 exts  NEURO: Alert but with AMS. Displaying paranoid behavior    POCT Blood Glucose.: 248 mg/dL (02-06-20 @ 16:32)  POCT Blood Glucose.: 185 mg/dL (02-06-20 @ 08:24)  POCT Blood Glucose.: 175 mg/dL (02-05-20 @ 22:58)  POCT Blood Glucose.: 231 mg/dL (02-05-20 @ 17:58)  POCT Blood Glucose.: 231 mg/dL (02-05-20 @ 12:28)  POCT Blood Glucose.: 281 mg/dL (02-05-20 @ 09:04)  POCT Blood Glucose.: 212 mg/dL (02-04-20 @ 21:47)  POCT Blood Glucose.: 272 mg/dL (02-04-20 @ 17:29)  POCT Blood Glucose.: 342 mg/dL (02-04-20 @ 13:51)  POCT Blood Glucose.: 168 mg/dL (02-04-20 @ 08:18)  POCT Blood Glucose.: 270 mg/dL (02-03-20 @ 22:42)  POCT Blood Glucose.: 381 mg/dL (02-03-20 @ 17:43)      02-05    141  |  102  |  27<H>  ----------------------------<  260<H>  5.2   |  23  |  0.98    EGFR if : 72  EGFR if non : 62    Ca    10.0      02-05    TPro  7.3  /  Alb  3.9  /  TBili  <0.1<L>  /  DBili  x   /  AST  21  /  ALT  16  /  AlkPhos  101  02-05    Thyroid Function Tests:  02-01 @ 22:37 TSH 8.35 FreeT4 1.3 T3 -- Anti TPO -- Anti Thyroglobulin Ab -- TSI --  01-28 @ 17:35 TSH -- FreeT4 0.7 T3 -- Anti TPO -- Anti Thyroglobulin Ab -- TSI --      Hemoglobin A1C, Whole Blood: 9.0 % <H> [4.0 - 5.6] (01-28-20 @ 19:30)  Hemoglobin A1C, Whole Blood: 9.2 % <H> [4.0 - 5.6] (01-27-20 @ 17:36)

## 2020-02-06 NOTE — PROVIDER CONTACT NOTE (OTHER) - REASON
Patient refused all evening medication except tramadol despite all efforts from 3 different nurses including the charge nurse

## 2020-02-06 NOTE — CONSULT NOTE ADULT - ASSESSMENT
CristhianLoren  64F PMHx RA, pulm HTN, SBO s/p exlap c/b extended bacteremia presented with 3 weeks of AMS, agitation, combative behavior towards staff. Underwent MRI w/ anesthesia today for w/u of presumed encephalopathy, found to have 3mm R subacute SDH, otherwise MRI/A wnl. Exam: AAOx3, FC while yelling at nurse/myself, UE antigrav, trace movement LE (baseline)  - No neurosurgical intervention  - CT head with acute change in exam

## 2020-02-06 NOTE — PROGRESS NOTE ADULT - PROBLEM SELECTOR PLAN 9
- DVT ppx: Lovenox (patient refuses at times)   - Diet: DASH diet w/evening snack  - Dispo: Pending  - ADDENDUM: Spoke to pts family members about decision making.  The children agree that Norris will serve as the point of  and will be the final decision maker representing the family for when decisions have to be made.  They are in agreement with moving forward with sedation/potential intubation for pt to undergo further medical testing. Discussions had on 2/3/20. - Davis Kramer

## 2020-02-06 NOTE — PROGRESS NOTE ADULT - SUBJECTIVE AND OBJECTIVE BOX
Clinical Indication: Mental status change    PREPROCEDURE:    Patient presents for diagnostic lumbar puncture under general anesthesia following an MRI with anesthesia sedation.  Risks and benefits were discussed with patient and/or health care proxy.  Risks include but are not limited to headache, bleeding, infection and nerve damage.    Patient and/or health care proxy understands and consents to procedure.    POSTPROCEDURE:    Lumbar puncture was performed at the L3-4  level in the right lateral decubitus position using a 20 gauge needle by the anesthesiologist (Dr. Nails with me assisting)in the MRI holding area.   30 cc of CSF  was collected and hand delivered to the lab.    Patient tolerated the procedure well and left the department in stable condition, to recover from anesthesia sedation  in the recovery room.

## 2020-02-06 NOTE — PROGRESS NOTE ADULT - PROBLEM SELECTOR PLAN 7
-  with slight AG (18), BHB 4 (elevated) on presentation  - hospital course complicated by FBGs in 300s; in acceptable range when not refusing insulin  - Per endocrine, c/w lantus 30 + Humalog 15 TID with meals, mod ISS since on steroids -  with slight AG (18), BHB 4 (elevated) on presentation  - hospital course complicated by FBGs in 300s; now better controlled   - Per endocrine, c/w lantus 30 + Humalog 15 TID with meals, mod ISS since on steroids

## 2020-02-06 NOTE — PROGRESS NOTE ADULT - SUBJECTIVE AND OBJECTIVE BOX
Wound Surgery Progress Note:    HPI:  64f hx functional paraplegia bedbound, hypothyroidism, pulmonary HTN, DM on insulin, COPD/CHRIS on noctournal CPAP and 3L home), RA on chronic prednisone, chronic tremors, SBO s/p ex lap with lysis of adhesions (4 retention sutures) c/b eviscerated 2/2 a coughing episode s/p ex lap (6 retention sutures) c/b multifocal pneumonia, developed hypercapnic respiratory failure requiring intubation 19, s/p bronch 19, and extubated on 19, course further c/b Enterococci bacteremia.    Patient unreliable historian, history obtained from Son Norris(lives w/ patient) and Daughter who claim to be HCP. Patient's behavior was first noted to change 3weeks ago. She was accusatory of family for 'stealing $180k from her closet", repeatedly calling back restaurants of which she ordered from claiming she never received deliver when she did, also called police on multiple occasions claiming son was trying to hurt her as well as accusing him of "stealing her oxygen". Patient has fired multiple HHAs claiming they were not useful for her needs. patient's son has also noticed she would drink about "1-2 gallons" of water a day however did not notice change in urination habits. Also c/o constipation, of which son had given OTC meds to help, patient had 1 "baseball size, hard stool" after straining w/o blood described as brown in color. Recently has been spitting at HHAs and caretaker son, throwing her bedpan filled with urine and feces at them.     Ms. Morrow was encountered on an alternating air with low air loss surface resting comfortably. She requested that wound team return to do her routine dressing change. Though the abdominal wound is essentially healed, Ms. Morrow insists on keeping it dressed as ordered below. She also requested additional wound supplies because "someone stole my wound supplies from my bag".    PAST MEDICAL & SURGICAL HISTORY:  Diverticulosis  RA (Rheumatoid Arthritis)  Tooth Abscess  Arthritis  Cigarette Smoker  Chronic Asthma  Adult Hypothyroidism  Borderline Diabetes Mellitus  High Cholesterol  H/O: HTN  Wrist Disorder: S/P Surgery  History of  Section    REVIEW OF SYSTEMS  Respiratory and Thorax: no SOB or cough  Musculoskeletal:	 RA deformities of bilateral hands  Skin: abdominal wound with scars, contraction and small areas of dry crust over areas formerly superficially denuded	    MEDICATIONS  (STANDING):  budesonide  80 MICROgram(s)/formoterol 4.5 MICROgram(s) Inhaler 2 Puff(s) Inhalation two times a day  dextrose 5%. 1000 milliLiter(s) (50 mL/Hr) IV Continuous <Continuous>  dextrose 50% Injectable 12.5 Gram(s) IV Push once  dextrose 50% Injectable 25 Gram(s) IV Push once  dextrose 50% Injectable 25 Gram(s) IV Push once  gabapentin 400 milliGRAM(s) Oral three times a day  influenza   Vaccine 0.5 milliLiter(s) IntraMuscular once  insulin lispro (HumaLOG) corrective regimen sliding scale   SubCutaneous every 6 hours  levothyroxine 175 MICROGram(s) Oral daily  metoprolol succinate ER 50 milliGRAM(s) Oral daily  OLANZapine 5 milliGRAM(s) Oral two times a day  pantoprazole    Tablet 40 milliGRAM(s) Oral before breakfast  predniSONE   Tablet 15 milliGRAM(s) Oral daily  primidone 50 milliGRAM(s) Oral at bedtime  simvastatin 20 milliGRAM(s) Oral at bedtime    MEDICATIONS  (PRN):  acetaminophen   Tablet .. 650 milliGRAM(s) Oral every 6 hours PRN Temp greater or equal to 38C (100.4F), Mild Pain (1 - 3), Moderate Pain (4 - 6)  albuterol/ipratropium for Nebulization 3 milliLiter(s) Nebulizer every 6 hours PRN Shortness of Breath and/or Wheezing  dextrose 40% Gel 15 Gram(s) Oral once PRN Blood Glucose LESS THAN 70 milliGRAM(s)/deciLiter  glucagon  Injectable 1 milliGRAM(s) IntraMuscular once PRN Glucose <70 milliGRAM(s)/deciLiter  haloperidol    Injectable 2 milliGRAM(s) IntraMuscular every 4 hours PRN Agitation  haloperidol    Injectable 2 milliGRAM(s) IV Push every 4 hours PRN Agitation  melatonin 3 milliGRAM(s) Oral at bedtime PRN Insomnia  naproxen 250 milliGRAM(s) Oral every 6 hours PRN Moderate Pain (4 - 6)  OLANZapine Injectable 5 milliGRAM(s) IntraMuscular every 4 hours PRN agitation  traMADol 100 milliGRAM(s) Oral three times a day PRN Severe Pain (7 - 10)    Allergies    No Known Allergies    Intolerances    Seroquel (Other)    Vital Signs Last 24 Hrs  T(C): --  T(F): --  HR: 71 (2020 21:36) (71 - 71)  BP: 158/91 (2020 21:36) (158/91 - 158/91)  BP(mean): --  RR: --  SpO2: --    Physical Exam:  General: NAD, Obese,  Respiratory: no SOB on room air  Gastrointestinal: soft, post op abdomen  Neurology: +sensation grossly intact, however could not move BLE, + movement of BUE  Musculoskeletal: deformities of Bilateral hands with contracted fingers and extended wrists,  Vascular: no BLE edema, DP/PT pulses palpable, BLE equally warm, no acute ischemia noted  Skin:  abdominal surgical wound 99% healed, with scattered small, superficial, dry crusts over newly epithelialized areas, + keloids and scar tissue, scant serosanguinous drainage,No odor, erythema, increased warmth, tenderness, induration, fluctuance    LABS:      141  |  102  |  27<H>  ----------------------------<  260<H>  5.2   |  23  |  0.98    Ca    10.0      2020 16:47    TPro  7.3  /  Alb  3.9  /  TBili  <0.1<L>  /  DBili  x   /  AST  21  /  ALT  16  /  AlkPhos  101  05                          9.4    13.78 )-----------( 473      ( 2020 16:47 )             30.1     PT/INR - ( 2020 16:47 )   PT: 10.4 sec;   INR: 0.91 ratio         PTT - ( 2020 16:47 )  PTT:29.6 sec

## 2020-02-06 NOTE — PROGRESS NOTE ADULT - PROBLEM SELECTOR PLAN 4
Patient w/ anterior abdominal wound from SBO revision   - wound care following    - patient refusing dressing changes per nursing and exam by physician   - s/p dressing change w/ ana maría Pisano at bedside 2/3   - risk of infection discussed with ana maría Pisano  - CTM for s&s of infection

## 2020-02-06 NOTE — CHART NOTE - NSCHARTNOTEFT_GEN_A_CORE
Neurology initially saw patient for AMS/agitation/concern for AIE, recommended workup/management of myxedema and endocrine evaluation  Chart reviewed, pt originally came in with multiple metabolic issues including  --> 52, hyperglycemic, TPO ab 272, in addition to RF quant 518, elevated ESR and CRP.  Notified by team today that LP was done since patient is still agitated and psychotic, and wanted further reccs for what studies should be sent, including workup for AIE.  Recommend basic CSF studies, flow, cytometry, cultures, viral and fungal studies, ENCES (encephalopathy), HSV, NMDA r ab, paraneoplastic, VDRL.   Pending results of spinal tap but in the interim recommend continued follow up with psych and endocrine.    Bianca Naik  PG3 Neurology Resident.

## 2020-02-06 NOTE — PROGRESS NOTE ADULT - ATTENDING COMMENTS
Underwent MRI brain and MRA/MRA brain and neck today. Underwent LP today. EKG qtc of 500. Fingersticks improved today.    Orquidea Rowland D.O.  Hospitalist Pager # [phone]

## 2020-02-06 NOTE — PROGRESS NOTE ADULT - PROBLEM SELECTOR PLAN 5
- Bcx + for GPR on 1/12, 1 out of 2 bottles   -+Bcx from 1/12 sent to [facility] laboratories, currently no results  - Blood cx obtained on 1/25 no growth X 5 days.  - Will monitor off Abx for now as w/out fever and leukocytosis - Bcx + for GPR on 1/12, 1 out of 2 bottles   -+Bcx from 1/12 sent to [facility] laboratories, currently no results  - Blood cx obtained on 1/25 no growth X 5 days.  - Patient noted to have leukocytosis, unable to obtain temperature as patient refusing  - will monitor off abx for now as repeat cultures ngtd

## 2020-02-06 NOTE — PROGRESS NOTE ADULT - PROBLEM SELECTOR PLAN 3
-  on admission; most recent TSH 8.35 2/1  - anti-TPO elevated c/w autoimmune thyroiditis   - c/w 175 mg as per Endocrine, more proper weight based regimen   - Endocrinology following -  on admission; most recent TSH 8.35 2/1  - anti-TPO elevated c/w autoimmune thyroiditis   - c/w 175 mg as per Endocrine   - Endocrinology following

## 2020-02-06 NOTE — PROVIDER CONTACT NOTE (OTHER) - REASON
Patient agitated refused nurses assisted by supervisor into  her room. Refused VS and all AM medications inc luding BP med.  Son at bedside requests to give his mom some times to calm down and become frustrated as well.

## 2020-02-06 NOTE — PROGRESS NOTE ADULT - ASSESSMENT
Impression:    Abdominal surgical wound - chronic     Recommend:   1.) topical therapy: abd wound - cleanse with NS, pat dry, apply adaptic, cover with aquacel, secure with tegederm every 7 days  2.) Nutrition consult for optimization    Care as per medicine will follow w/ you  Upon discharge [facility] as [facility] [address] [place] [address] [phone]  Seen with Dr. Lowe and discussed with clinical nurse  Thank you for this consult  Lori Burton, NP-C, CWOCN [id number]

## 2020-02-06 NOTE — PROGRESS NOTE ADULT - SUBJECTIVE AND OBJECTIVE BOX
***************************************************************  Dr. Kellie Bernal  Internal Medicine   [facility] Pager: [phone]  [facility] Pager: [phone]   ***************************************************************    AFTAB BASS  64y  MRN: [id number]    Subjective:    Patient is a 64y old  Female who presents with a chief complaint of change in mental status (05 Feb 2020 17:26)      Interval history/overnight events:    ON refusing xyprexa. Refusing finger sticks this AM. NPO for MRI brain/ MR head and neck/ MRV and LP w/ xray under sedation.       MEDICATIONS  (STANDING):  budesonide  80 MICROgram(s)/formoterol 4.5 MICROgram(s) Inhaler 2 Puff(s) Inhalation two times a day  dextrose 5%. 1000 milliLiter(s) (50 mL/Hr) IV Continuous <Continuous>  dextrose 50% Injectable 12.5 Gram(s) IV Push once  dextrose 50% Injectable 25 Gram(s) IV Push once  dextrose 50% Injectable 25 Gram(s) IV Push once  gabapentin 400 milliGRAM(s) Oral three times a day  influenza   Vaccine 0.5 milliLiter(s) IntraMuscular once  insulin glargine Injectable (LANTUS) 20 Unit(s) SubCutaneous once  insulin lispro (HumaLOG) corrective regimen sliding scale   SubCutaneous every 6 hours  levothyroxine 175 MICROGram(s) Oral daily  metoprolol succinate ER 50 milliGRAM(s) Oral daily  OLANZapine 5 milliGRAM(s) Oral two times a day  pantoprazole    Tablet 40 milliGRAM(s) Oral before breakfast  predniSONE   Tablet 15 milliGRAM(s) Oral daily  primidone 50 milliGRAM(s) Oral at bedtime  simvastatin 20 milliGRAM(s) Oral at bedtime    MEDICATIONS  (PRN):  acetaminophen   Tablet .. 650 milliGRAM(s) Oral every 6 hours PRN Temp greater or equal to 38C (100.4F), Mild Pain (1 - 3), Moderate Pain (4 - 6)  albuterol/ipratropium for Nebulization 3 milliLiter(s) Nebulizer every 6 hours PRN Shortness of Breath and/or Wheezing  dextrose 40% Gel 15 Gram(s) Oral once PRN Blood Glucose LESS THAN 70 milliGRAM(s)/deciLiter  glucagon  Injectable 1 milliGRAM(s) IntraMuscular once PRN Glucose <70 milliGRAM(s)/deciLiter  haloperidol    Injectable 2 milliGRAM(s) IntraMuscular every 4 hours PRN Agitation  haloperidol    Injectable 2 milliGRAM(s) IV Push every 4 hours PRN Agitation  melatonin 3 milliGRAM(s) Oral at bedtime PRN Insomnia  naproxen 250 milliGRAM(s) Oral every 6 hours PRN Moderate Pain (4 - 6)  OLANZapine Injectable 5 milliGRAM(s) IntraMuscular every 4 hours PRN agitation  traMADol 100 milliGRAM(s) Oral three times a day PRN Severe Pain (7 - 10)        Objective:    Vitals: Vital Signs Last 24 Hrs  T(C): --  T(F): --  HR: 71 (02-05-20 @ 21:36) (71 - 71)  BP: 158/91 (02-05-20 @ 21:36) (158/91 - 158/91)  BP(mean): --  RR: --  SpO2: --            I&O's Summary    PHYSICAL EXAM: declining full exam   GENERAL: elderly female, agitated   HEENT: PERRL, no scleral icterus, no head and neck lad   CHEST/LUNG: not assessed   HEART: Not assessed   ABDOMEN: Not assessed   SKIN: skin thickening over lower abdomen at eviscerated SBO site; patient refusing further eval   NERVOUS SYSTEM: Alert & Oriented X3   PSYCH: agitated, delirious       LABS:    02-05    141  |  102  |  27<H>  ----------------------------<  260<H>  5.2   |  23  |  0.98    Ca    10.0      05 Feb 2020 16:47    TPro  7.3  /  Alb  3.9  /  TBili  <0.1<L>  /  DBili  x   /  AST  21  /  ALT  16  /  AlkPhos  101  02-05    PT/INR - ( 05 Feb 2020 16:47 )   PT: 10.4 sec;   INR: 0.91 ratio         PTT - ( 05 Feb 2020 16:47 )  PTT:29.6 sec                                        9.4    13.78 )-----------( 473      ( 05 Feb 2020 16:47 )             30.1     CAPILLARY BLOOD GLUCOSE      POCT Blood Glucose.: 175 mg/dL (05 Feb 2020 22:58)  POCT Blood Glucose.: 231 mg/dL (05 Feb 2020 17:58)  POCT Blood Glucose.: 231 mg/dL (05 Feb 2020 12:28)  POCT Blood Glucose.: 281 mg/dL (05 Feb 2020 09:04)          RADIOLOGY & ADDITIONAL TESTS:            Imaging Personally Reviewed:  [ ] YES  [ ] NO    Consultants involved in case:   Consultant(s) Notes Reviewed:  [ ] YES  [ ] NO:   Care Discussed with Consultants/Other Providers [ ] YES  [ ] NO ***************************************************************  Dr. Kellie Bernal  Internal Medicine   [facility] Pager: [phone]  [facility] Pager: [phone]   ***************************************************************    AFTAB BASS  64y  MRN: [id number]    Subjective:    Patient is a 64y old  Female who presents with a chief complaint of change in mental status (05 Feb 2020 17:26)      Interval history/overnight events:    ON refusing xyprexa. Refusing finger sticks this AM. NPO for MRI brain/ MR head and neck/ MRV and LP w/ xray under sedation.     This AM, agitated, refusing exam.       MEDICATIONS  (STANDING):  budesonide  80 MICROgram(s)/formoterol 4.5 MICROgram(s) Inhaler 2 Puff(s) Inhalation two times a day  dextrose 5%. 1000 milliLiter(s) (50 mL/Hr) IV Continuous <Continuous>  dextrose 50% Injectable 12.5 Gram(s) IV Push once  dextrose 50% Injectable 25 Gram(s) IV Push once  dextrose 50% Injectable 25 Gram(s) IV Push once  gabapentin 400 milliGRAM(s) Oral three times a day  influenza   Vaccine 0.5 milliLiter(s) IntraMuscular once  insulin glargine Injectable (LANTUS) 20 Unit(s) SubCutaneous once  insulin lispro (HumaLOG) corrective regimen sliding scale   SubCutaneous every 6 hours  levothyroxine 175 MICROGram(s) Oral daily  metoprolol succinate ER 50 milliGRAM(s) Oral daily  OLANZapine 5 milliGRAM(s) Oral two times a day  pantoprazole    Tablet 40 milliGRAM(s) Oral before breakfast  predniSONE   Tablet 15 milliGRAM(s) Oral daily  primidone 50 milliGRAM(s) Oral at bedtime  simvastatin 20 milliGRAM(s) Oral at bedtime    MEDICATIONS  (PRN):  acetaminophen   Tablet .. 650 milliGRAM(s) Oral every 6 hours PRN Temp greater or equal to 38C (100.4F), Mild Pain (1 - 3), Moderate Pain (4 - 6)  albuterol/ipratropium for Nebulization 3 milliLiter(s) Nebulizer every 6 hours PRN Shortness of Breath and/or Wheezing  dextrose 40% Gel 15 Gram(s) Oral once PRN Blood Glucose LESS THAN 70 milliGRAM(s)/deciLiter  glucagon  Injectable 1 milliGRAM(s) IntraMuscular once PRN Glucose <70 milliGRAM(s)/deciLiter  haloperidol    Injectable 2 milliGRAM(s) IntraMuscular every 4 hours PRN Agitation  haloperidol    Injectable 2 milliGRAM(s) IV Push every 4 hours PRN Agitation  melatonin 3 milliGRAM(s) Oral at bedtime PRN Insomnia  naproxen 250 milliGRAM(s) Oral every 6 hours PRN Moderate Pain (4 - 6)  OLANZapine Injectable 5 milliGRAM(s) IntraMuscular every 4 hours PRN agitation  traMADol 100 milliGRAM(s) Oral three times a day PRN Severe Pain (7 - 10)        Objective:    Vitals: Vital Signs Last 24 Hrs  T(C): --  T(F): --  HR: 71 (02-05-20 @ 21:36) (71 - 71)  BP: 158/91 (02-05-20 @ 21:36) (158/91 - 158/91)  BP(mean): --  RR: --  SpO2: --            I&O's Summary    PHYSICAL EXAM: declining full exam   GENERAL: elderly female, agitated   HEENT: PERRL, no scleral icterus, no head and neck lad   CHEST/LUNG: not assessed   HEART: Not assessed   ABDOMEN: Not assessed   SKIN: skin thickening over lower abdomen at eviscerated SBO site; patient refusing further eval   NERVOUS SYSTEM: Alert & Oriented X3   PSYCH: agitated, delirious       LABS:    02-05    141  |  102  |  27<H>  ----------------------------<  260<H>  5.2   |  23  |  0.98    Ca    10.0      05 Feb 2020 16:47    TPro  7.3  /  Alb  3.9  /  TBili  <0.1<L>  /  DBili  x   /  AST  21  /  ALT  16  /  AlkPhos  101  02-05    PT/INR - ( 05 Feb 2020 16:47 )   PT: 10.4 sec;   INR: 0.91 ratio         PTT - ( 05 Feb 2020 16:47 )  PTT:29.6 sec                                        9.4    13.78 )-----------( 473      ( 05 Feb 2020 16:47 )             30.1     CAPILLARY BLOOD GLUCOSE      POCT Blood Glucose.: 175 mg/dL (05 Feb 2020 22:58)  POCT Blood Glucose.: 231 mg/dL (05 Feb 2020 17:58)  POCT Blood Glucose.: 231 mg/dL (05 Feb 2020 12:28)  POCT Blood Glucose.: 281 mg/dL (05 Feb 2020 09:04)          RADIOLOGY & ADDITIONAL TESTS:            Imaging Personally Reviewed:  [ ] YES  [ ] NO    Consultants involved in case:   Consultant(s) Notes Reviewed:  [ ] YES  [ ] NO:   Care Discussed with Consultants/Other Providers [ ] YES  [ ] NO

## 2020-02-06 NOTE — PROGRESS NOTE ADULT - PROBLEM SELECTOR PLAN 1
- Unclear etiology though most likely organic  - ddx: Autoimmune encephalitis 2/2 longstanding RA vs Endocrinopathy given abnormal thyroid dysfunction though less likely given correction of TSH/FT4 with minimal change in behavior vs other psychiatric vs infectious etiology  - f/u MR brain, MRA head and neck, MRV w/ sedation followed by Xray-LP under sedation planned for 2/6 AT 11:45  - rheumatology recs appreciated: ALESSIA, ZAFAR, ribosomal P, dsdna neg; NMDRA pending   - psych recs aprpeciated: c/w zyprexa 5mg BID (previously 2.5mg BID) standing with 5 mg PRN agitation + Haldol 2mg IV or IM Q4H for agitation  - patient refusing EKG to assess qtc  - plan for EKG while sedated - Unclear etiology though most likely organic  - ddx: Autoimmune encephalitis 2/2 longstanding RA vs Endocrinopathy given abnormal thyroid dysfunction though less likely given correction of TSH/FT4 with minimal change in behavior vs other psychiatric vs infectious etiology  - f/u MR brain, MRA head and neck, MRV w/ sedation followed by Xray-LP under sedation planned for 2/6 AT 11:45  - rheumatology recs appreciated: ALESSIA, ZAFAR, ribosomal P, dsdna neg, NMDRAb  - psych recs appreciated: c/w zyprexa 5mg BID (previously 2.5mg BID) standing with 5 mg PRN agitation + Haldol 2mg IV or IM Q4H for agitation  - patient refusing EKG to assess qtc  - plan for EKG while sedated - Unclear etiology though most likely organic  - ddx: Autoimmune encephalitis 2/2 longstanding RA vs Endocrinopathy given abnormal thyroid dysfunction though less likely given correction of TSH/FT4 with minimal change in behavior vs other psychiatric vs infectious etiology  - f/u MR brain, MRA head and neck, MRV w/ sedation followed by Xray-LP under sedation planned for 2/6 AT 11:45  - rheumatology recs appreciated: ALESSIA, ZAFAR, ribosomal P, dsdna neg, NMDRAb  - psych recs appreciated: c/w zyprexa 5mg BID (previously 2.5mg BID) standing with 5 mg PRN agitation + Haldol 2mg IV or IM Q4H for agitation  - Qtc 500

## 2020-02-06 NOTE — PROGRESS NOTE ADULT - ASSESSMENT
65 y/o F w/h/o uncontrolled T2DM with unknown complications. Also h/o functional paraplegia, hypothyroidism, pulmonary hypertension, COPD, RA on chronic prednisone, SBO s/p ex lap with lysis of adhesions c/b multifocal pneumonia. Here with AMS/agitation. Pt still with AMS of unclear etiology and lack of capacity per mental health team. Tolerating POs and taking insulin, Synthroid and Prednisone today but refusing other meds on and off. Glycemic control remains above goal with BG mostly in 200s today. Pt on chronic Prednisone 15mg daily for RA. No hypoglycemia. Will increase insulin doses to BG goal 100s to 180s.   Spoke to team about NPO order after MN, per team pt going for multiple tests including MRI and LP tomorrow under sedation. Primary team adjusted insulin doses for NPO status. Agreed with recs.        #1 Hypothyroidism, unspecified type.  -Her TSH levels have not been consistent which may be due to assay interference:  1/22 TSH 29  1/25   1/27 TSH 52.3  2/3  TSH 8.35  -C/w Synthroid 175mcg po q daily on an empty stomach  -Recheck TSH in 4-6 weeks   -Plan discussed with pt/team.      #2 Type 2 diabetes mellitus with hyperglycemia, with long-term current use of insulin.    -Test BG ac and hs  -Restart Lantus dose to 32 units q am  -Restart Humalog to 15 units ac meals. Will adjust as needed. Make sure pt eats. Hold if pt not eating  -c/w Humalog moderate correction scale since pt is on Prednisone.  --Pt unable to care for self at this time. Pt will need to continue basal/bolus therapy upon discharge.  - Plan discussed with pt's team.

## 2020-02-06 NOTE — CONSULT NOTE ADULT - SUBJECTIVE AND OBJECTIVE BOX
p (1480)     HPI:  64f hx functional paraplegia bedbound, hypothyroidism, pulmonary HTN, DM on insulin, COPD/CHRIS on noctournal CPAP and 3L home), RA on chronic prednisone, chronic tremors, SBO s/p ex lap with lysis of adhesions (4 retention sutures) c/b eviscerated 2/2 a coughing episode s/p ex lap (6 retention sutures) c/b multifocal pneumonia, developed hypercapnic respiratory failure requiring intubation 19, s/p bronch 19, and extubated on 19, course further c/b Enterococci bacteremia.    Patient unreliable historian, history obtained from Son Norris(lives w/ patient) and Daughter who claim to be HCP. Patient's behavior was first noted to change 3weeks ago. She was accusatory of family for 'stealing $180k from her closet", repeatedly calling back restaurants of which she ordered from claiming she never received deliver when she did, also called police on multiple occasions claiming son was trying to hurt her as well as accusing him of "stealing her oxygen". Patient has fired multiple HHAs claiming they were not useful for her needs. patient's son has also noticed she would drink about "1-2 gallons" of water a day however did not notice change in urination habits. Also c/o constipation, of which son had given OTC meds to help, patient had 1 "baseball size, hard stool" after straining w/o blood described as brown in color. Recently has been spitting at HHAs and caretaker son, throwing her bedpan filled with urine and feces at them.     In the ER patient agitated requiring zyprexa total 20mg, haloperidol 10, versed 8mg    Patient was taken to [facility] for evaluation; was evaluated by psych and deemed to have capacity to leave AMA. Patient w/ elevated WBC and SAM; also later blood culture x1 bottle significant for gram Positive rods was sent to labatory outside hospital system for speciation. (2020 11:56)      Imaging:    Exam: AAOx3, FC while yelling at nurse/myself, UE antigrav, trace movement LE (baseline)    --Anticoagulation:    =====================  PAST MEDICAL HISTORY   Diverticulosis  RA (Rheumatoid Arthritis)  Tooth Abscess  Arthritis  Cigarette Smoker  Chronic Asthma  Adult Hypothyroidism  Borderline Diabetes Mellitus  High Cholesterol  H/O: HTN    PAST SURGICAL HISTORY   Wrist Disorder  History of  Section    Seroquel (Other)      MEDICATIONS:  Antibiotics:    Neuro:  acetaminophen   Tablet .. 650 milliGRAM(s) Oral every 6 hours PRN  gabapentin 400 milliGRAM(s) Oral three times a day  haloperidol    Injectable 2 milliGRAM(s) IntraMuscular every 4 hours PRN  haloperidol    Injectable 2 milliGRAM(s) IV Push every 4 hours PRN  melatonin 3 milliGRAM(s) Oral at bedtime PRN  naproxen 250 milliGRAM(s) Oral every 6 hours PRN  OLANZapine 5 milliGRAM(s) Oral two times a day  OLANZapine Injectable 5 milliGRAM(s) IntraMuscular every 4 hours PRN  primidone 50 milliGRAM(s) Oral at bedtime  traMADol 100 milliGRAM(s) Oral three times a day PRN    Other:  albuterol/ipratropium for Nebulization 3 milliLiter(s) Nebulizer every 6 hours PRN  budesonide  80 MICROgram(s)/formoterol 4.5 MICROgram(s) Inhaler 2 Puff(s) Inhalation two times a day  dextrose 40% Gel 15 Gram(s) Oral once PRN  dextrose 5%. 1000 milliLiter(s) IV Continuous <Continuous>  dextrose 50% Injectable 12.5 Gram(s) IV Push once  dextrose 50% Injectable 25 Gram(s) IV Push once  dextrose 50% Injectable 25 Gram(s) IV Push once  glucagon  Injectable 1 milliGRAM(s) IntraMuscular once PRN  influenza   Vaccine 0.5 milliLiter(s) IntraMuscular once  insulin glargine Injectable (LANTUS) 32 Unit(s) SubCutaneous every morning  insulin lispro (HumaLOG) corrective regimen sliding scale   SubCutaneous three times a day before meals  insulin lispro (HumaLOG) corrective regimen sliding scale   SubCutaneous at bedtime  insulin lispro Injectable (HumaLOG) 15 Unit(s) SubCutaneous three times a day with meals  levothyroxine 175 MICROGram(s) Oral daily  metoprolol succinate ER 50 milliGRAM(s) Oral daily  pantoprazole    Tablet 40 milliGRAM(s) Oral before breakfast  predniSONE   Tablet 15 milliGRAM(s) Oral daily  simvastatin 20 milliGRAM(s) Oral at bedtime      SOCIAL HISTORY:   Occupation:   Marital Status:     FAMILY HISTORY:  No pertinent family history in first degree relatives      ROS: Negative except per HPI    LABS:  PT/INR - ( 2020 16:47 )   PT: 10.4 sec;   INR: 0.91 ratio         PTT - ( 2020 16:47 )  PTT:29.6 sec                        9.4    13.78 )-----------( 473      ( 2020 16:47 )             30.1         141  |  102  |  27<H>  ----------------------------<  260<H>  5.2   |  23  |  0.98    Ca    10.0      2020 16:47    TPro  7.3  /  Alb  3.9  /  TBili  <0.1<L>  /  DBili  x   /  AST  21  /  ALT  16  /  AlkPhos  101  02-05

## 2020-02-07 LAB
CRYPTOC AG CSF-ACNC: NEGATIVE — SIGNIFICANT CHANGE UP
GLUCOSE BLDC GLUCOMTR-MCNC: 172 MG/DL — HIGH (ref 70–99)
GLUCOSE BLDC GLUCOMTR-MCNC: 228 MG/DL — HIGH (ref 70–99)
GLUCOSE BLDC GLUCOMTR-MCNC: 243 MG/DL — HIGH (ref 70–99)
GLUCOSE BLDC GLUCOMTR-MCNC: 290 MG/DL — HIGH (ref 70–99)
TM INTERPRETATION: SIGNIFICANT CHANGE UP

## 2020-02-07 PROCEDURE — 99232 SBSQ HOSP IP/OBS MODERATE 35: CPT

## 2020-02-07 PROCEDURE — 99233 SBSQ HOSP IP/OBS HIGH 50: CPT | Mod: GC

## 2020-02-07 PROCEDURE — 99232 SBSQ HOSP IP/OBS MODERATE 35: CPT | Mod: GC

## 2020-02-07 RX ORDER — INSULIN GLARGINE 100 [IU]/ML
34 INJECTION, SOLUTION SUBCUTANEOUS EVERY MORNING
Refills: 0 | Status: DISCONTINUED | OUTPATIENT
Start: 2020-02-08 | End: 2020-02-12

## 2020-02-07 RX ORDER — INSULIN LISPRO 100/ML
18 VIAL (ML) SUBCUTANEOUS
Refills: 0 | Status: DISCONTINUED | OUTPATIENT
Start: 2020-02-08 | End: 2020-02-10

## 2020-02-07 RX ADMIN — Medication 6: at 12:28

## 2020-02-07 RX ADMIN — PANTOPRAZOLE SODIUM 40 MILLIGRAM(S): 20 TABLET, DELAYED RELEASE ORAL at 06:18

## 2020-02-07 RX ADMIN — Medication 15 UNIT(S): at 17:51

## 2020-02-07 RX ADMIN — Medication 15 UNIT(S): at 08:47

## 2020-02-07 RX ADMIN — INSULIN GLARGINE 32 UNIT(S): 100 INJECTION, SOLUTION SUBCUTANEOUS at 08:47

## 2020-02-07 RX ADMIN — Medication 175 MICROGRAM(S): at 06:18

## 2020-02-07 RX ADMIN — Medication 15 UNIT(S): at 12:28

## 2020-02-07 RX ADMIN — TRAMADOL HYDROCHLORIDE 100 MILLIGRAM(S): 50 TABLET ORAL at 22:10

## 2020-02-07 RX ADMIN — OLANZAPINE 5 MILLIGRAM(S): 15 TABLET, FILM COATED ORAL at 06:26

## 2020-02-07 RX ADMIN — GABAPENTIN 400 MILLIGRAM(S): 400 CAPSULE ORAL at 14:06

## 2020-02-07 RX ADMIN — Medication 50 MILLIGRAM(S): at 06:18

## 2020-02-07 RX ADMIN — PRIMIDONE 50 MILLIGRAM(S): 250 TABLET ORAL at 22:10

## 2020-02-07 RX ADMIN — GABAPENTIN 400 MILLIGRAM(S): 400 CAPSULE ORAL at 02:48

## 2020-02-07 RX ADMIN — Medication 4: at 08:46

## 2020-02-07 RX ADMIN — GABAPENTIN 400 MILLIGRAM(S): 400 CAPSULE ORAL at 17:55

## 2020-02-07 RX ADMIN — BUDESONIDE AND FORMOTEROL FUMARATE DIHYDRATE 2 PUFF(S): 160; 4.5 AEROSOL RESPIRATORY (INHALATION) at 06:16

## 2020-02-07 RX ADMIN — Medication 15 MILLIGRAM(S): at 06:18

## 2020-02-07 RX ADMIN — SIMVASTATIN 20 MILLIGRAM(S): 20 TABLET, FILM COATED ORAL at 22:10

## 2020-02-07 RX ADMIN — Medication 4: at 17:52

## 2020-02-07 NOTE — PROGRESS NOTE ADULT - ASSESSMENT
63 yo F PMH RA (seropositive, erosive on chronic prednisone), hypothyroidism, DM2, COPD presented with acute encephalopathy in setting of elevated TSH.  We are consulted to r/o RA flare.    #AMS/agitation likely metabolic encephalopathy  # Hypothyroidism   MRI/MRI with small size SDH. S/p LP 2/6: CSF with no inflammatory picture, Less concern for autoimmune related etiology for AMS at this point,    Recommend:   -c/w IV synthroid per endo  -c/w home prednisone and naproxen doses  -Less concern for autoimmune related etiology for AMS at this point, Rheumatology will sign off, recall us if any new concern     Case was seen and discussed with Dr. Maria De Jesus Garner MD  Rheum fellow PGY 4   Pager [phone]

## 2020-02-07 NOTE — PROGRESS NOTE ADULT - PROBLEM SELECTOR PLAN 2
- Per Ethics, given that treatment is in the best interest of patient, it is permissible to use chemical and physical restraint to provide such treatment   - Psych has already declared that patient lacks capacity to refuse medications or life-preserving interventions and son Norris (HCP) is in agreement w/ plan  - patient has living will on file that says she is full code and would like full medical therapy; as per ethics conversation on 2/3, this justifies MRI and LP (see note 2/4)

## 2020-02-07 NOTE — PROGRESS NOTE ADULT - ASSESSMENT
65 y/o F w/h/o uncontrolled T2DM with unknown complications. Also h/o functional paraplegia, hypothyroidism, pulmonary hypertension, COPD, RA on chronic prednisone, SBO s/p ex lap with lysis of adhesions c/b multifocal pneumonia. Here with AMS/agitation. Pt still with AMS of unclear etiology and lack of capacity per mental health team. Tolerating POs and taking insulin, Synthroid and Prednisone today but refusing other meds on and off. Glycemic control remains above goal with BG mostly in 200s today. Pt on chronic Prednisone 15mg daily for RA. No hypoglycemia. Will increase insulin doses to BG goal 100s to 180s.

## 2020-02-07 NOTE — PROGRESS NOTE ADULT - PROBLEM SELECTOR PLAN 7
-  with slight AG (18), BHB 4 (elevated) on presentation  - hospital course complicated by FBGs in 300s; now better controlled   - Per endocrine, c/w lantus 32 + Humalog 15 TID with meals, mod ISS since on steroids

## 2020-02-07 NOTE — PROGRESS NOTE ADULT - ATTENDING COMMENTS
MRI brain with small SDH- neurosurgery consulted; mri/mra otherwise unremarkable. LP results pending. Zyprexa discontinued per psych given prolonged qtc.      Orquidea Rowland D.O.  Hospitalist Pager # [phone]

## 2020-02-07 NOTE — PROGRESS NOTE ADULT - PROVIDER SPECIALTY LIST ADULT
Admit Date: 2017    POD * No surgery found *    Procedure:  * No surgery found *    Subjective:     Patient has no new complaints. No flatus or BM but feels much better. Objective:     Blood pressure 131/80, pulse (!) 106, temperature 98.8 °F (37.1 °C), resp. rate 17, height 5' 1\" (1.549 m), weight 220 lb (99.8 kg), SpO2 94 %. Temp (24hrs), Av.4 °F (36.9 °C), Min:97.7 °F (36.5 °C), Max:98.8 °F (37.1 °C)    3450 cc NG o/p past 24 hours    Physical Exam:  GENERAL: alert, cooperative, no distress, appears stated age, LUNG: clear to auscultation bilaterally, HEART: regular rate and rhythm, ABDOMEN: soft, non-tender, less distended. Bowel sounds normal. No masses,  no organomegaly, EXTREMITIES:  extremities normal, atraumatic, no cyanosis or edema    Labs:   Recent Results (from the past 24 hour(s))   EKG, 12 LEAD, INITIAL    Collection Time: 17  9:36 AM   Result Value Ref Range    Ventricular Rate 86 BPM    Atrial Rate 86 BPM    P-R Interval 140 ms    QRS Duration 70 ms    Q-T Interval 378 ms    QTC Calculation (Bezet) 452 ms    Calculated P Axis 66 degrees    Calculated R Axis 3 degrees    Calculated T Axis 33 degrees    Diagnosis       Normal sinus rhythm  When compared with ECG of 12-AUG-2015 09:40,  premature atrial complexes are no longer present  Confirmed by Taylor Reyes ([id number]) on 2017 2:02:33 PM         Data Review images and reports reviewed    Assessment:     Principal Problem:    SBO (small bowel obstruction) (2015)        Plan/Recommendations/Medical Decision Making:     Continue present treatment   No return of bowel function yet. Large NG o/p past 24 hours  Continue decompression today. Repeat films and labs in am.  Hopefully can avoid any surgical intervention. Will Sands.  Miguelina Molina MD, [facility] Internal [facility]

## 2020-02-07 NOTE — PROGRESS NOTE ADULT - PROBLEM SELECTOR PLAN 2
-C/w levothyroxine 175 MICROGram(s) Oral daily  -Rechack in 4-6 weeks.  -Make sure is given on an empty stomach  -Plan discussed with pt/team.  Contact info: [phone] (24/7). pager [phone]

## 2020-02-07 NOTE — PROGRESS NOTE ADULT - PROBLEM SELECTOR PLAN 5
- Bcx + for GPR on 1/12, 1 out of 2 bottles   -+Bcx from 1/12 sent to [facility] laboratories, currently no results  - Blood cx obtained on 1/25 no growth X 5 days.  - Patient noted to have leukocytosis, unable to obtain temperature as patient refusing  - will monitor off abx for now as repeat cultures ngtd

## 2020-02-07 NOTE — PROGRESS NOTE ADULT - PROBLEM SELECTOR PLAN 4
Patient w/ anterior abdominal wound from SBO revision   - wound care following; change dressing every 3 days  - patient refusing dressing changes per nursing and exam by physician   - s/p dressing change 2/6  - risk of infection discussed with ana maría Pisano  - CT for s&s of infection

## 2020-02-07 NOTE — PROGRESS NOTE ADULT - PROBLEM SELECTOR PLAN 1
- Unclear etiology  - ddx: Autoimmune encephalitis 2/2 longstanding RA vs Endocrinopathy given abnormal thyroid dysfunction though less likely given correction of TSH/FT4 with minimal change in behavior vs other psychiatric vs infectious etiology  - MR brain, MRA head and neck, MRV w/ sedation along with LP was done under sedation on 2/6  - rheumatology recs appreciated: ALESSIA, ZAFAR, ribosomal P, dsdna neg, NMDRAb  - psych recs appreciated: c/w zyprexa 5mg BID (previously 2.5mg BID) standing with 5 mg PRN agitation + Haldol 2mg IV or IM Q4H for agitation  - Qtc 500 on 2/6  - MRI on 2/6 showed Right holohemispheric subdural hematoma subacute by signal characteristics measuring 3 mm in thickness; per Neurosurg just monitor for now and repeat CT if new change in neuro status arises  - DVT ppx changed to SCDs given bleed

## 2020-02-07 NOTE — PROGRESS NOTE ADULT - SUBJECTIVE AND OBJECTIVE BOX
INTERVAL HPI/OVERNIGHT EVENTS:    No acute events overnight.  Patient refusing to provide full history but complains of b/l hands pain.    MEDICATIONS  (STANDING):  budesonide  80 MICROgram(s)/formoterol 4.5 MICROgram(s) Inhaler 2 Puff(s) Inhalation two times a day  dextrose 5%. 1000 milliLiter(s) (50 mL/Hr) IV Continuous <Continuous>  dextrose 50% Injectable 12.5 Gram(s) IV Push once  dextrose 50% Injectable 25 Gram(s) IV Push once  dextrose 50% Injectable 25 Gram(s) IV Push once  enoxaparin Injectable 40 milliGRAM(s) SubCutaneous every 12 hours  gabapentin 400 milliGRAM(s) Oral three times a day  influenza   Vaccine 0.5 milliLiter(s) IntraMuscular once  insulin glargine Injectable (LANTUS) 30 Unit(s) SubCutaneous every morning  insulin lispro (HumaLOG) corrective regimen sliding scale   SubCutaneous at bedtime  insulin lispro (HumaLOG) corrective regimen sliding scale   SubCutaneous three times a day before meals  insulin lispro Injectable (HumaLOG) 9 Unit(s) SubCutaneous three times a day before meals  levothyroxine Injectable 100 MICROGram(s) IV Push once  melatonin 3 milliGRAM(s) Oral at bedtime  metoprolol succinate ER 50 milliGRAM(s) Oral daily  OLANZapine 2.5 milliGRAM(s) Oral two times a day  pantoprazole    Tablet 40 milliGRAM(s) Oral before breakfast  predniSONE   Tablet 15 milliGRAM(s) Oral daily  primidone 50 milliGRAM(s) Oral at bedtime  simvastatin 20 milliGRAM(s) Oral at bedtime    MEDICATIONS  (PRN):  acetaminophen   Tablet .. 650 milliGRAM(s) Oral every 6 hours PRN Temp greater or equal to 38C (100.4F), Mild Pain (1 - 3), Moderate Pain (4 - 6)  albuterol/ipratropium for Nebulization 3 milliLiter(s) Nebulizer every 6 hours PRN Shortness of Breath and/or Wheezing  dextrose 40% Gel 15 Gram(s) Oral once PRN Blood Glucose LESS THAN 70 milliGRAM(s)/deciLiter  glucagon  Injectable 1 milliGRAM(s) IntraMuscular once PRN Glucose <70 milliGRAM(s)/deciLiter  haloperidol    Injectable 2 milliGRAM(s) IntraMuscular every 4 hours PRN Agitation  haloperidol    Injectable 2 milliGRAM(s) IV Push every 4 hours PRN Agitation  melatonin 3 milliGRAM(s) Oral at bedtime PRN Insomnia  traMADol 100 milliGRAM(s) Oral daily PRN Severe Pain (7 - 10)      Allergies    No Known Allergies    Intolerances    Seroquel (Other)      Vital Signs Last 24 Hrs  T(C): 36.9 (07 Feb 2020 06:20), Max: 36.9 (06 Feb 2020 21:29)  T(F): 98.5 (07 Feb 2020 06:20), Max: 98.5 (07 Feb 2020 06:20)  HR: 103 (07 Feb 2020 11:48) (80 - 105)  BP: 108/73 (07 Feb 2020 11:48) (98/66 - 156/87)  BP(mean): --  RR: 20 (07 Feb 2020 11:48) (20 - 20)  SpO2: 100% (07 Feb 2020 11:48) (94% - 100%)  PHYSICAL EXAM:    P/E: limited, patient refusing B/l hands with severe deformity and ulnar subluxation    LABS:    01-28    140  |  108  |  10  ----------------------------<  149<H>  4.1   |  19<L>  |  0.80    Ca    9.3      28 Jan 2020 15:30  Phos  2.1     01-28  Mg     2.2     01-28      Cerebrospinal Fluid Cell Count-1 (02.06.20 @ 15:40)    CSF Monocytes/Macrophages: 31 %    CSF Segmented Neutrophils: 2: Differential is based on 100 cells counted after cytocentrifugation. %    CSF Lymphocytes: 67 %    CSF Appearance: Clear    Total Nucleated Cell Count, CSF: 1 /uL    CSF Color: No Color      Culture - CSF with Gram Stain . (02.06.20 @ 18:18)    Gram Stain:   polymorphonuclear leukocytes seen  No organisms seen  by cytocentrifuge    Specimen Source: .CSF CSF    Culture Results:   No growth    RADIOLOGY & ADDITIONAL TESTS:    < from: MR Angio Head w/wo IV Cont (02.06.20 @ 14:15) >  INTERPRETATION:  HISTORY: Altered mental status. Evaluate for autoimmune vasculitis. Encephalopathy.    Technique: Multiplanar and multisequence brain MRI, MRA and neck MRA were performed. Additionally, brain and neck MRA was performed with gadolinium enhanced technique.    MR angiography of intracranial and extracranial circulation was performed with time of flight imaging technique. Additionally, approximately 15 cc of intravenous gadolinium was administered for contrast-enhanced MR angiography of the intracranial and extracranial circulation. Maximal intensity projection images were reviewed in multiple planes.    COMPARISON: CT head 1/12/2020.    FINDINGS:    Brain MRI:    Right holohemispheric subdural hematoma measuring 3 mm in thickness is identified not clearly appreciated on the patient's prior CT evaluation. No abnormal parenchymal restricted diffusion to suggest acute/subacute infarct. No intraparenchymal hematoma, hydrocephalus or midline shift. Signal characteristics are hyperintense T1 and hyperintense T2 suggesting subacute hemorrhage    No abnormal parenchymal or leptomeningeal enhancement.    Mild periventricular T2/FLAIR hyperintense foci, likely sequela of chronic microvascular white matter ischemic changes.    Right sphenoid sinus mucosal thickening. Remaining paranasal sinuses are clear. Left mastoid tip fluid. Right tympanomastoid cavities are clear. Status post bilateral lens replacement surgery.    Brain MRA:    Internal carotid arteries demonstrate normal flow related enhancement to the [place] bilaterally. Visualized portions of the anterior, middle, posterior cerebral and basilar arteries are within normal limits.There is no evidence of aneurysm, vascular malformation or occlusion.    The vertebral arteries are normal to the vertebrobasilar confluence.     Neck MRA:    The aortic arch and origins of the great vessels are within normal limits.    The origin of the bilateral common carotid arteries are normal in course and caliber to the bifurcation. No significant stenosis along bilateral internal carotid arteries. Tortuosity of the bilateral cervical segment of the internal carotid arteries with medial orientation and a retropharyngeal course appreciated bilaterally..    The origin of the left vertebral artery is normal. The origin of the right vertebral artery is not well visualized. Bilateral vertebral arteries are normal in caliber and course to the intracranial circulation and vertebrobasilar confluence.    IMPRESSION:    Brain MRI:    Right holohemispheric subdural hematoma subacute by signal characteristics measuring 3 mm in thickness and not clearly identified on the patient's prior CT 1/12/2020.    No acute/subacute infarct or abnormal parenchymal/leptomeningeal enhancement.    Brain MRA:    No aneurysm, significant stenosis, dissection or occlusion.    Neck MRA: Suggestion of right vertebral origin stenosis with a for visualization of the origin of the right vertebral but a more normal appearing cervical segment of the right vertebral artery        < end of copied text >

## 2020-02-07 NOTE — PROGRESS NOTE ADULT - PROBLEM SELECTOR PLAN 9
- DVT ppx: Lovenox (patient refuses at times) SWITCHED TO SCDS GIVEN BLEED  - Diet: DASH diet w/evening snack  - Dispo: Pending  - ADDENDUM: Spoke to pts family members about decision making.  The children agree that Norris will serve as the point of  and will be the final decision maker representing the family for when decisions have to be made.  They are in agreement with moving forward with sedation/potential intubation for pt to undergo further medical testing. Discussions had on 2/3/20. - Davis Kramer

## 2020-02-07 NOTE — PROGRESS NOTE ADULT - PROBLEM SELECTOR PLAN 3
-  on admission; most recent TSH 8.35 2/1  - anti-TPO elevated c/w autoimmune thyroiditis   - c/w 175 mg as per Endocrine   - Endocrinology following; repeat TFTs 4-6 weeks

## 2020-02-07 NOTE — PROGRESS NOTE ADULT - SUBJECTIVE AND OBJECTIVE BOX
DIABETES FOLLOW UP NOTE: Saw pt earlier today  INTERVAL HX: 63 y/o F w/h/o uncontrolled T2DM with unknown complications. Also h/o functional paraplegia, hypothyroidism, pulmonary hypertension, COPD, RA on chronic prednisone, SBO s/p ex lap with lysis of adhesions c/b multifocal pneumonia. Here with AMS/agitation. Pt still with AMS of unclear etiology and lack of capacity per mental health team. Tolerating POs and taking insulin, Synthroid and Prednisone today. Glycemic control remains above goal with BG mostly in 200s today. Pt on chronic Prednisone 15mg daily for RA. No hypoglycemia. Ha d MRI/MRA/LP yesterday under sedation,     Review of Systems:  Pt unable due to AMS. Talks a lot without purpose.     Allergies    No Known Allergies    Intolerances    Seroquel (Other)    MEDICATIONS:   insulin glargine Injectable (LANTUS) 32 Unit(s) SubCutaneous every morning  insulin lispro (HumaLOG) corrective regimen sliding scale   SubCutaneous three times a day before meals  insulin lispro (HumaLOG) corrective regimen sliding scale   SubCutaneous at bedtime  insulin lispro Injectable (HumaLOG) 15 Unit(s) SubCutaneous three times a day with meals  levothyroxine 175 MICROGram(s) Oral daily  predniSONE   Tablet 15 milliGRAM(s) Oral daily  simvastatin 20 milliGRAM(s) Oral at bedtime      PHYSICAL EXAM:  VITALS: T(C): 36.9 (02-07-20 @ 06:20)  T(F): 98.5 (02-07-20 @ 06:20), Max: 98.5 (02-07-20 @ 06:20)  HR: 103 (02-07-20 @ 11:48) (80 - 105)  BP: 108/73 (02-07-20 @ 11:48) (98/66 - 156/87)  RR:  (20 - 20)  SpO2:  (94% - 100%)  Wt(kg): --  GENERAL: Female laying in bed in NAD  Abdomen: Soft, nontender, non distended, obese  Extremities: Warm, no edema in all 4 exts  NEURO: Alert, disoriented, paranoid>states every one is after her and wants son to come and pick her up.    LABS:  POCT Blood Glucose.: 243 mg/dL (02-07-20 @ 17:46)  POCT Blood Glucose.: 290 mg/dL (02-07-20 @ 12:23)  POCT Blood Glucose.: 228 mg/dL (02-07-20 @ 08:38)  POCT Blood Glucose.: 248 mg/dL (02-06-20 @ 16:32)  POCT Blood Glucose.: 185 mg/dL (02-06-20 @ 08:24)  POCT Blood Glucose.: 175 mg/dL (02-05-20 @ 22:58)  POCT Blood Glucose.: 231 mg/dL (02-05-20 @ 17:58)  POCT Blood Glucose.: 231 mg/dL (02-05-20 @ 12:28)  POCT Blood Glucose.: 281 mg/dL (02-05-20 @ 09:04)  POCT Blood Glucose.: 212 mg/dL (02-04-20 @ 21:47)                            9.4    13.78 )-----------( 473      ( 05 Feb 2020 16:47 )             30.1       02-05    141  |  102  |  27<H>  ----------------------------<  260<H>  5.2   |  23  |  0.98    EGFR if : 72      Ca    10.0      02-05    TPro  7.3  /  Alb  3.9  /  TBili  <0.1<L>  /  DBili  x   /  AST  21  /  ALT  16  /  AlkPhos  101  02-05      Thyroid Function Tests:  02-01 @ 22:37 TSH 8.35 FreeT4 1.3 T3 -- Anti TPO -- Anti Thyroglobulin Ab -- TSI --  01-28 @ 17:35 TSH -- FreeT4 0.7 T3 -- Anti TPO -- Anti Thyroglobulin Ab -- TSI --      Hemoglobin A1C, Whole Blood: 9.0 % <H> [4.0 - 5.6] (01-28-20 @ 19:30)  Hemoglobin A1C, Whole Blood: 9.2 % <H> [4.0 - 5.6] (01-27-20 @ 17:36)      01-27 Chol 307<H> <H> HDL 50 Trig 352<H>

## 2020-02-07 NOTE — PROGRESS NOTE ADULT - SUBJECTIVE AND OBJECTIVE BOX
Davis Kramer MD  Cell: [phone]  Pager: [phone]    S:  Still agitated and combative with delusions        VITAL SIGNS:  Vital Signs Last 24 Hrs  T(C): 36.9 (07 Feb 2020 06:20), Max: 36.9 (06 Feb 2020 21:29)  T(F): 98.5 (07 Feb 2020 06:20), Max: 98.5 (07 Feb 2020 06:20)  HR: 103 (07 Feb 2020 11:48) (80 - 110)  BP: 108/73 (07 Feb 2020 11:48) (98/66 - 188/96)  BP(mean): --  RR: 20 (07 Feb 2020 11:48) (13 - 20)  SpO2: 100% (07 Feb 2020 11:48) (93% - 100%)      PHYSICAL EXAM:   unable to do physical exam, pt continues to refuse and is combative, son aware                          9.4    13.78 )-----------( 473      ( 05 Feb 2020 16:47 )             30.1     02-05    141  |  102  |  27<H>  ----------------------------<  260<H>  5.2   |  23  |  0.98    Ca    10.0      05 Feb 2020 16:47    TPro  7.3  /  Alb  3.9  /  TBili  <0.1<L>  /  DBili  x   /  AST  21  /  ALT  16  /  AlkPhos  101  02-05      CAPILLARY BLOOD GLUCOSE      POCT Blood Glucose.: 290 mg/dL (07 Feb 2020 12:23)  POCT Blood Glucose.: 228 mg/dL (07 Feb 2020 08:38)  POCT Blood Glucose.: 248 mg/dL (06 Feb 2020 16:32)      MEDICATIONS  (STANDING):  budesonide  80 MICROgram(s)/formoterol 4.5 MICROgram(s) Inhaler 2 Puff(s) Inhalation two times a day  dextrose 5%. 1000 milliLiter(s) (50 mL/Hr) IV Continuous <Continuous>  dextrose 50% Injectable 12.5 Gram(s) IV Push once  dextrose 50% Injectable 25 Gram(s) IV Push once  dextrose 50% Injectable 25 Gram(s) IV Push once  gabapentin 400 milliGRAM(s) Oral three times a day  influenza   Vaccine 0.5 milliLiter(s) IntraMuscular once  insulin glargine Injectable (LANTUS) 32 Unit(s) SubCutaneous every morning  insulin lispro (HumaLOG) corrective regimen sliding scale   SubCutaneous three times a day before meals  insulin lispro (HumaLOG) corrective regimen sliding scale   SubCutaneous at bedtime  insulin lispro Injectable (HumaLOG) 15 Unit(s) SubCutaneous three times a day with meals  levothyroxine 175 MICROGram(s) Oral daily  metoprolol succinate ER 50 milliGRAM(s) Oral daily  OLANZapine 5 milliGRAM(s) Oral two times a day  pantoprazole    Tablet 40 milliGRAM(s) Oral before breakfast  predniSONE   Tablet 15 milliGRAM(s) Oral daily  primidone 50 milliGRAM(s) Oral at bedtime  simvastatin 20 milliGRAM(s) Oral at bedtime

## 2020-02-07 NOTE — PROGRESS NOTE ADULT - ATTENDING COMMENTS
Patient seen and examined at bedside. Agree with assessment and plan as stated above. Please call [phone] for any questions or concerns

## 2020-02-07 NOTE — PROGRESS NOTE BEHAVIORAL HEALTH - NSBHCONSULTRECOMMENDOTHER_PSY_A_CORE FT
1) dc olanzapine for now due to prolonged QTc; f/u QTc < 500  2) for acute severe  agitation, continue haldol 2 mg IM Q4hr PRN,  3) pt's confusion likely metabolic in nature, f/u MRI brain and LP, consider paraneoplastic panel; during sedation for MRI, consider EKG for QTc prolongation  4) pt lacks capacity to refuse treatments, defer to pt's HCP   5) pt cannot leave AMA

## 2020-02-07 NOTE — PROGRESS NOTE ADULT - PROBLEM SELECTOR PLAN 1
-Test BG ac and hs  -Change Lantus dose to 32 units q am  -Increase Humalog to 18 units ac meals. Will adjust as needed. Make sure pt eats. Hold if pt not eating  -c/w Humalog moderate correction scale since pt is on Prednisone.  --Pt unable to care for self at this time. Pt will need to continue basal/bolus therapy upon discharge.  -Plan discussed with pt's team.  Contact info: [phone] (24/7). pager [phone]. -Test BG ac and hs  -Change Lantus dose to 34 units q am  -Increase Humalog to 18 units ac meals. Will adjust as needed. Make sure pt eats. Hold if pt not eating  -c/w Humalog moderate correction scale since pt is on Prednisone.  --Pt unable to care for self at this time. Pt will need to continue basal/bolus therapy upon discharge.  -Plan discussed with pt's team.  Contact info: [phone] (24/7). pager [phone].

## 2020-02-08 LAB
GLUCOSE BLDC GLUCOMTR-MCNC: 192 MG/DL — HIGH (ref 70–99)
GLUCOSE BLDC GLUCOMTR-MCNC: 195 MG/DL — HIGH (ref 70–99)
GLUCOSE BLDC GLUCOMTR-MCNC: 210 MG/DL — HIGH (ref 70–99)

## 2020-02-08 PROCEDURE — 99233 SBSQ HOSP IP/OBS HIGH 50: CPT | Mod: GC

## 2020-02-08 RX ORDER — TRAMADOL HYDROCHLORIDE 50 MG/1
100 TABLET ORAL THREE TIMES A DAY
Refills: 0 | Status: DISCONTINUED | OUTPATIENT
Start: 2020-02-08 | End: 2020-02-12

## 2020-02-08 RX ADMIN — GABAPENTIN 400 MILLIGRAM(S): 400 CAPSULE ORAL at 10:26

## 2020-02-08 RX ADMIN — Medication 50 MILLIGRAM(S): at 06:12

## 2020-02-08 RX ADMIN — GABAPENTIN 400 MILLIGRAM(S): 400 CAPSULE ORAL at 01:54

## 2020-02-08 RX ADMIN — INSULIN GLARGINE 34 UNIT(S): 100 INJECTION, SOLUTION SUBCUTANEOUS at 08:50

## 2020-02-08 RX ADMIN — TRAMADOL HYDROCHLORIDE 100 MILLIGRAM(S): 50 TABLET ORAL at 09:36

## 2020-02-08 RX ADMIN — PANTOPRAZOLE SODIUM 40 MILLIGRAM(S): 20 TABLET, DELAYED RELEASE ORAL at 06:12

## 2020-02-08 RX ADMIN — TRAMADOL HYDROCHLORIDE 100 MILLIGRAM(S): 50 TABLET ORAL at 22:50

## 2020-02-08 RX ADMIN — Medication 175 MICROGRAM(S): at 06:12

## 2020-02-08 RX ADMIN — BUDESONIDE AND FORMOTEROL FUMARATE DIHYDRATE 2 PUFF(S): 160; 4.5 AEROSOL RESPIRATORY (INHALATION) at 06:35

## 2020-02-08 RX ADMIN — Medication 2: at 16:08

## 2020-02-08 RX ADMIN — GABAPENTIN 400 MILLIGRAM(S): 400 CAPSULE ORAL at 17:55

## 2020-02-08 RX ADMIN — TRAMADOL HYDROCHLORIDE 100 MILLIGRAM(S): 50 TABLET ORAL at 10:36

## 2020-02-08 RX ADMIN — Medication 18 UNIT(S): at 16:09

## 2020-02-08 RX ADMIN — TRAMADOL HYDROCHLORIDE 100 MILLIGRAM(S): 50 TABLET ORAL at 18:23

## 2020-02-08 RX ADMIN — GABAPENTIN 400 MILLIGRAM(S): 400 CAPSULE ORAL at 22:49

## 2020-02-08 RX ADMIN — Medication 18 UNIT(S): at 18:53

## 2020-02-08 RX ADMIN — TRAMADOL HYDROCHLORIDE 100 MILLIGRAM(S): 50 TABLET ORAL at 22:07

## 2020-02-08 RX ADMIN — TRAMADOL HYDROCHLORIDE 100 MILLIGRAM(S): 50 TABLET ORAL at 17:54

## 2020-02-08 RX ADMIN — Medication 4: at 08:51

## 2020-02-08 RX ADMIN — Medication 250 MILLIGRAM(S): at 12:36

## 2020-02-08 RX ADMIN — BUDESONIDE AND FORMOTEROL FUMARATE DIHYDRATE 2 PUFF(S): 160; 4.5 AEROSOL RESPIRATORY (INHALATION) at 18:06

## 2020-02-08 RX ADMIN — Medication 15 MILLIGRAM(S): at 06:12

## 2020-02-08 RX ADMIN — Medication 2: at 18:54

## 2020-02-08 RX ADMIN — Medication 18 UNIT(S): at 08:50

## 2020-02-08 NOTE — PROGRESS NOTE ADULT - ATTENDING COMMENTS
Some LP results pending-- when finalized will ask neurology to comment. If results largely unremarkable, patient may need to be transferred to inpatient psych.    Orquidea Rowland D.O.  Hospitalist Pager # [phone]

## 2020-02-08 NOTE — PROGRESS NOTE ADULT - PROBLEM SELECTOR PLAN 7
-  with slight AG (18), BHB 4 (elevated) on presentation  - hospital course complicated by FBGs in 300s; now better controlled   - Per endocrine, c/w lantus 34 + Humalog 18 TID with meals, mod ISS since on steroids

## 2020-02-08 NOTE — PROGRESS NOTE ADULT - PROBLEM SELECTOR PLAN 5
- Bcx + for GPR on 1/12, 1 out of 2 bottles   -+Bcx from 1/12 sent to [facility] laboratories, currently no results  - Blood cx obtained on 1/25 no growth X 5 days.  - Patient noted to have leukocytosis, but remains afebrile   - will monitor off abx for now as repeat cultures ngtd

## 2020-02-08 NOTE — PROGRESS NOTE ADULT - SUBJECTIVE AND OBJECTIVE BOX
***************************************************************  Dr. Kellie Bernal  Internal Medicine   [facility] Pager: [phone]  [facility] Pager: [phone]   ***************************************************************    AFTAB BASS  64y  MRN: [id number]    Subjective:    Patient is a 64y old  Female who presents with a chief complaint of change in mental status (07 Feb 2020 19:08)      Interval history/overnight events:    FLORA ON.       MEDICATIONS  (STANDING):  budesonide  80 MICROgram(s)/formoterol 4.5 MICROgram(s) Inhaler 2 Puff(s) Inhalation two times a day  dextrose 5%. 1000 milliLiter(s) (50 mL/Hr) IV Continuous <Continuous>  dextrose 50% Injectable 12.5 Gram(s) IV Push once  dextrose 50% Injectable 25 Gram(s) IV Push once  dextrose 50% Injectable 25 Gram(s) IV Push once  gabapentin 400 milliGRAM(s) Oral three times a day  influenza   Vaccine 0.5 milliLiter(s) IntraMuscular once  insulin glargine Injectable (LANTUS) 34 Unit(s) SubCutaneous every morning  insulin lispro (HumaLOG) corrective regimen sliding scale   SubCutaneous three times a day before meals  insulin lispro (HumaLOG) corrective regimen sliding scale   SubCutaneous at bedtime  insulin lispro Injectable (HumaLOG) 18 Unit(s) SubCutaneous three times a day with meals  levothyroxine 175 MICROGram(s) Oral daily  metoprolol succinate ER 50 milliGRAM(s) Oral daily  pantoprazole    Tablet 40 milliGRAM(s) Oral before breakfast  predniSONE   Tablet 15 milliGRAM(s) Oral daily  primidone 50 milliGRAM(s) Oral at bedtime  simvastatin 20 milliGRAM(s) Oral at bedtime    MEDICATIONS  (PRN):  acetaminophen   Tablet .. 650 milliGRAM(s) Oral every 6 hours PRN Temp greater or equal to 38C (100.4F), Mild Pain (1 - 3), Moderate Pain (4 - 6)  albuterol/ipratropium for Nebulization 3 milliLiter(s) Nebulizer every 6 hours PRN Shortness of Breath and/or Wheezing  dextrose 40% Gel 15 Gram(s) Oral once PRN Blood Glucose LESS THAN 70 milliGRAM(s)/deciLiter  glucagon  Injectable 1 milliGRAM(s) IntraMuscular once PRN Glucose <70 milliGRAM(s)/deciLiter  haloperidol    Injectable 2 milliGRAM(s) IntraMuscular every 4 hours PRN Agitation  haloperidol    Injectable 2 milliGRAM(s) IV Push every 4 hours PRN Agitation  melatonin 3 milliGRAM(s) Oral at bedtime PRN Insomnia  naproxen 250 milliGRAM(s) Oral every 6 hours PRN Moderate Pain (4 - 6)        Objective:    Vitals: Vital Signs Last 24 Hrs  T(C): 36.8 (02-08-20 @ 06:18), Max: 36.8 (02-07-20 @ 22:08)  T(F): 98.2 (02-08-20 @ 06:18), Max: 98.3 (02-07-20 @ 22:08)  HR: 66 (02-08-20 @ 06:37) (66 - 103)  BP: 128/77 (02-08-20 @ 06:18) (108/73 - 137/83)  BP(mean): --  RR: 18 (02-08-20 @ 06:18) (18 - 20)  SpO2: 98% (02-08-20 @ 06:37) (98% - 100%)            I&O's Summary    07 Feb 2020 07:01  -  08 Feb 2020 07:00  --------------------------------------------------------  IN: 250 mL / OUT: 0 mL / NET: 250 mL        LABS:    02-05    141  |  102  |  27<H>  ----------------------------<  260<H>  5.2   |  23  |  0.98    Ca    10.0      05 Feb 2020 16:47    TPro  7.3  /  Alb  3.9  /  TBili  <0.1<L>  /  DBili  x   /  AST  21  /  ALT  16  /  AlkPhos  101  02-05                                            9.4    13.78 )-----------( 473      ( 05 Feb 2020 16:47 )             30.1     CAPILLARY BLOOD GLUCOSE      POCT Blood Glucose.: 172 mg/dL (07 Feb 2020 21:57)  POCT Blood Glucose.: 243 mg/dL (07 Feb 2020 17:46)  POCT Blood Glucose.: 290 mg/dL (07 Feb 2020 12:23)  POCT Blood Glucose.: 228 mg/dL (07 Feb 2020 08:38)          RADIOLOGY & ADDITIONAL TESTS:            Imaging Personally Reviewed:  [ ] YES  [ ] NO    Consultants involved in case:   Consultant(s) Notes Reviewed:  [ ] YES  [ ] NO:   Care Discussed with Consultants/Other Providers [ ] YES  [ ] NO ***************************************************************  Dr. Kellie Bernla  Internal Medicine   [facility] Pager: [phone]  [facility] Pager: [phone]   ***************************************************************    AFTAB BASS  64y  MRN: [id number]    Subjective:    Patient is a 64y old  Female who presents with a chief complaint of change in mental status (07 Feb 2020 19:08)      Interval history/overnight events:    FLORA ON. Again, refusing to be examined this morning. Remains agitated, less than prior.       MEDICATIONS  (STANDING):  budesonide  80 MICROgram(s)/formoterol 4.5 MICROgram(s) Inhaler 2 Puff(s) Inhalation two times a day  dextrose 5%. 1000 milliLiter(s) (50 mL/Hr) IV Continuous <Continuous>  dextrose 50% Injectable 12.5 Gram(s) IV Push once  dextrose 50% Injectable 25 Gram(s) IV Push once  dextrose 50% Injectable 25 Gram(s) IV Push once  gabapentin 400 milliGRAM(s) Oral three times a day  influenza   Vaccine 0.5 milliLiter(s) IntraMuscular once  insulin glargine Injectable (LANTUS) 34 Unit(s) SubCutaneous every morning  insulin lispro (HumaLOG) corrective regimen sliding scale   SubCutaneous three times a day before meals  insulin lispro (HumaLOG) corrective regimen sliding scale   SubCutaneous at bedtime  insulin lispro Injectable (HumaLOG) 18 Unit(s) SubCutaneous three times a day with meals  levothyroxine 175 MICROGram(s) Oral daily  metoprolol succinate ER 50 milliGRAM(s) Oral daily  pantoprazole    Tablet 40 milliGRAM(s) Oral before breakfast  predniSONE   Tablet 15 milliGRAM(s) Oral daily  primidone 50 milliGRAM(s) Oral at bedtime  simvastatin 20 milliGRAM(s) Oral at bedtime    MEDICATIONS  (PRN):  acetaminophen   Tablet .. 650 milliGRAM(s) Oral every 6 hours PRN Temp greater or equal to 38C (100.4F), Mild Pain (1 - 3), Moderate Pain (4 - 6)  albuterol/ipratropium for Nebulization 3 milliLiter(s) Nebulizer every 6 hours PRN Shortness of Breath and/or Wheezing  dextrose 40% Gel 15 Gram(s) Oral once PRN Blood Glucose LESS THAN 70 milliGRAM(s)/deciLiter  glucagon  Injectable 1 milliGRAM(s) IntraMuscular once PRN Glucose <70 milliGRAM(s)/deciLiter  haloperidol    Injectable 2 milliGRAM(s) IntraMuscular every 4 hours PRN Agitation  haloperidol    Injectable 2 milliGRAM(s) IV Push every 4 hours PRN Agitation  melatonin 3 milliGRAM(s) Oral at bedtime PRN Insomnia  naproxen 250 milliGRAM(s) Oral every 6 hours PRN Moderate Pain (4 - 6)        Objective:    Vitals: Vital Signs Last 24 Hrs  T(C): 36.8 (02-08-20 @ 06:18), Max: 36.8 (02-07-20 @ 22:08)  T(F): 98.2 (02-08-20 @ 06:18), Max: 98.3 (02-07-20 @ 22:08)  HR: 66 (02-08-20 @ 06:37) (66 - 103)  BP: 128/77 (02-08-20 @ 06:18) (108/73 - 137/83)  BP(mean): --  RR: 18 (02-08-20 @ 06:18) (18 - 20)  SpO2: 98% (02-08-20 @ 06:37) (98% - 100%)            I&O's Summary    07 Feb 2020 07:01  -  08 Feb 2020 07:00  --------------------------------------------------------  IN: 250 mL / OUT: 0 mL / NET: 250 mL    PHYSICAL EXAM: declining full exam   GENERAL: elderly female, agitated respiring on 3L   HEENT: PERRL, no scleral icterus, no head and neck lad   CHEST/LUNG: not assessed   HEART: Not assessed   ABDOMEN: Not assessed   SKIN: skin thickening over lower abdomen at eviscerated SBO site; patient refusing further eval   NERVOUS SYSTEM: Alert & Oriented X3   PSYCH: agitated, delirious       LABS:    02-05    141  |  102  |  27<H>  ----------------------------<  260<H>  5.2   |  23  |  0.98    Ca    10.0      05 Feb 2020 16:47    TPro  7.3  /  Alb  3.9  /  TBili  <0.1<L>  /  DBili  x   /  AST  21  /  ALT  16  /  AlkPhos  101  02-05                                            9.4    13.78 )-----------( 473      ( 05 Feb 2020 16:47 )             30.1     CAPILLARY BLOOD GLUCOSE      POCT Blood Glucose.: 172 mg/dL (07 Feb 2020 21:57)  POCT Blood Glucose.: 243 mg/dL (07 Feb 2020 17:46)  POCT Blood Glucose.: 290 mg/dL (07 Feb 2020 12:23)  POCT Blood Glucose.: 228 mg/dL (07 Feb 2020 08:38)          RADIOLOGY & ADDITIONAL TESTS:            Imaging Personally Reviewed:  [ ] YES  [ ] NO    Consultants involved in case:   Consultant(s) Notes Reviewed:  [ ] YES  [ ] NO:   Care Discussed with Consultants/Other Providers [ ] YES  [ ] NO

## 2020-02-08 NOTE — PROGRESS NOTE ADULT - PROBLEM SELECTOR PLAN 1
Unclear etiology - possibly from 3mm right sided SDH ided on MRI brain; autoimmune encephalitis 2/2 longstanding RA less likely as MRI brain neg for inflammatory changes, from hypothyroidism less likely given correction of TSH/FT4 with minimal change in behavior vs. possible praneoplastic syndrome, vs other psychiatric or infectious etiology  - s/p LP 2/6 w/ sedation; CSF studies w/ 1 nucleated cell; gram stain w/ few PMNs, no orgs; CSF cultures NGTD; CSF PCR, cryptococcal antigen neg, HSV PCR negative   - s/p MRI 2/6 brain revealing for subacute 3mm right holohemispheric subdural hematoma   - neurosurgery recs appreciated: no surgical intervention at this time, repeat CT head w/ change in exam   - rheumatology recs appreciated: less likely rheumatological origin as MRI brain neg for inflammatory changes; serum ALESSIA, ZAFAR, ribosomal P, dsdna neg, NMDRAb neg   - psych recs appreciated: hold olanzapine given , Haldol 2mg IV or IM Q4H for agitation  - f/u CSF autoimmune and paraneoplastic panels; NMDA R ab   - DVT ppx changed to SCDs given bleed Unclear etiology - possibly from 3mm right sided SDH ided on MRI brain; autoimmune encephalitis 2/2 longstanding RA less likely as MRI brain neg for inflammatory changes, from hypothyroidism less likely given correction of TSH/FT4 with minimal change in behavior vs. possible praneoplastic syndrome, vs other psychiatric or infectious etiology  - s/p LP 2/6 w/ sedation; CSF studies w/ 1 nucleated cell; gram stain w/ few PMNs, no orgs; CSF cultures NGTD; CSF PCR, cryptococcal antigen neg, HSV PCR negative   - s/p MRI 2/6 brain revealing for subacute 3mm right holohemispheric subdural hematoma   - neurosurgery recs appreciated: no surgical intervention at this time, repeat CT head w/ change in exam   - rheumatology recs appreciated: less likely rheumatological origin as MRI brain neg for inflammatory changes; serum ALESSIA, ZAFAR, ribosomal P, dsdna neg, NMDRAb neg   - psych recs appreciated: hold olanzapine given , Haldol 2mg IV or IM Q4H for agitation  - f/u CSF autoimmune and paraneoplastic panels, VDRL, Borrelia, NMDA R ab   - DVT ppx changed to SCDs given bleed

## 2020-02-09 LAB
CULTURE RESULTS: NO GROWTH — SIGNIFICANT CHANGE UP
GLUCOSE BLDC GLUCOMTR-MCNC: 165 MG/DL — HIGH (ref 70–99)
GLUCOSE BLDC GLUCOMTR-MCNC: 171 MG/DL — HIGH (ref 70–99)
GLUCOSE BLDC GLUCOMTR-MCNC: 243 MG/DL — HIGH (ref 70–99)
NMDAR IGG TITR CSF IF: SIGNIFICANT CHANGE UP
SPECIMEN SOURCE: SIGNIFICANT CHANGE UP

## 2020-02-09 PROCEDURE — 99233 SBSQ HOSP IP/OBS HIGH 50: CPT | Mod: GC

## 2020-02-09 RX ADMIN — TRAMADOL HYDROCHLORIDE 100 MILLIGRAM(S): 50 TABLET ORAL at 10:13

## 2020-02-09 RX ADMIN — GABAPENTIN 400 MILLIGRAM(S): 400 CAPSULE ORAL at 23:41

## 2020-02-09 RX ADMIN — Medication 50 MILLIGRAM(S): at 08:24

## 2020-02-09 RX ADMIN — Medication 15 MILLIGRAM(S): at 08:25

## 2020-02-09 RX ADMIN — Medication 2: at 20:02

## 2020-02-09 RX ADMIN — GABAPENTIN 400 MILLIGRAM(S): 400 CAPSULE ORAL at 08:24

## 2020-02-09 RX ADMIN — Medication 18 UNIT(S): at 08:23

## 2020-02-09 RX ADMIN — PANTOPRAZOLE SODIUM 40 MILLIGRAM(S): 20 TABLET, DELAYED RELEASE ORAL at 08:25

## 2020-02-09 RX ADMIN — Medication 2: at 08:24

## 2020-02-09 RX ADMIN — Medication 4: at 15:47

## 2020-02-09 RX ADMIN — Medication 18 UNIT(S): at 20:03

## 2020-02-09 RX ADMIN — BUDESONIDE AND FORMOTEROL FUMARATE DIHYDRATE 2 PUFF(S): 160; 4.5 AEROSOL RESPIRATORY (INHALATION) at 05:55

## 2020-02-09 RX ADMIN — Medication 175 MICROGRAM(S): at 08:24

## 2020-02-09 RX ADMIN — Medication 18 UNIT(S): at 15:47

## 2020-02-09 RX ADMIN — INSULIN GLARGINE 34 UNIT(S): 100 INJECTION, SOLUTION SUBCUTANEOUS at 08:25

## 2020-02-09 RX ADMIN — GABAPENTIN 400 MILLIGRAM(S): 400 CAPSULE ORAL at 18:04

## 2020-02-09 RX ADMIN — TRAMADOL HYDROCHLORIDE 100 MILLIGRAM(S): 50 TABLET ORAL at 11:10

## 2020-02-09 NOTE — PROGRESS NOTE ADULT - SUBJECTIVE AND OBJECTIVE BOX
***************************************************************  Dr. Kellie Bernal  Internal Medicine   [facility] Pager: [phone]  [facility] Pager: [phone]   ***************************************************************    AFTAB BASS  64y  MRN: [id number]    Subjective:    Patient is a 64y old  Female who presents with a chief complaint of change in mental status (08 Feb 2020 07:43)      Interval history/overnight events:    FLORA ON. Refusing vitals and fingersticks this AM.      MEDICATIONS  (STANDING):  budesonide  80 MICROgram(s)/formoterol 4.5 MICROgram(s) Inhaler 2 Puff(s) Inhalation two times a day  dextrose 5%. 1000 milliLiter(s) (50 mL/Hr) IV Continuous <Continuous>  dextrose 50% Injectable 12.5 Gram(s) IV Push once  dextrose 50% Injectable 25 Gram(s) IV Push once  dextrose 50% Injectable 25 Gram(s) IV Push once  gabapentin 400 milliGRAM(s) Oral three times a day  influenza   Vaccine 0.5 milliLiter(s) IntraMuscular once  insulin glargine Injectable (LANTUS) 34 Unit(s) SubCutaneous every morning  insulin lispro (HumaLOG) corrective regimen sliding scale   SubCutaneous three times a day before meals  insulin lispro (HumaLOG) corrective regimen sliding scale   SubCutaneous at bedtime  insulin lispro Injectable (HumaLOG) 18 Unit(s) SubCutaneous three times a day with meals  levothyroxine 175 MICROGram(s) Oral daily  metoprolol succinate ER 50 milliGRAM(s) Oral daily  pantoprazole    Tablet 40 milliGRAM(s) Oral before breakfast  predniSONE   Tablet 15 milliGRAM(s) Oral daily  primidone 50 milliGRAM(s) Oral at bedtime  simvastatin 20 milliGRAM(s) Oral at bedtime    MEDICATIONS  (PRN):  acetaminophen   Tablet .. 650 milliGRAM(s) Oral every 6 hours PRN Temp greater or equal to 38C (100.4F), Mild Pain (1 - 3), Moderate Pain (4 - 6)  albuterol/ipratropium for Nebulization 3 milliLiter(s) Nebulizer every 6 hours PRN Shortness of Breath and/or Wheezing  dextrose 40% Gel 15 Gram(s) Oral once PRN Blood Glucose LESS THAN 70 milliGRAM(s)/deciLiter  glucagon  Injectable 1 milliGRAM(s) IntraMuscular once PRN Glucose <70 milliGRAM(s)/deciLiter  haloperidol    Injectable 2 milliGRAM(s) IntraMuscular every 4 hours PRN Agitation  haloperidol    Injectable 2 milliGRAM(s) IV Push every 4 hours PRN Agitation  melatonin 3 milliGRAM(s) Oral at bedtime PRN Insomnia  naproxen 250 milliGRAM(s) Oral every 6 hours PRN Moderate Pain (4 - 6)  traMADol 100 milliGRAM(s) Oral three times a day PRN Severe Pain (7 - 10)        Objective:    Vitals: Vital Signs Last 24 Hrs  T(C): 36.8 (02-09-20 @ 08:21), Max: 36.8 (02-09-20 @ 08:21)  T(F): 98.2 (02-09-20 @ 08:21), Max: 98.2 (02-09-20 @ 08:21)  HR: 86 (02-09-20 @ 08:21) (71 - 90)  BP: 139/- (02-09-20 @ 08:21) (139/- - 145/91)  BP(mean): --  RR: 18 (02-09-20 @ 08:21) (18 - 18)  SpO2: 97% (02-09-20 @ 08:21) (97% - 98%)            I&O's Summary        LABS:                              CAPILLARY BLOOD GLUCOSE      POCT Blood Glucose.: 165 mg/dL (09 Feb 2020 08:18)  POCT Blood Glucose.: 195 mg/dL (08 Feb 2020 18:51)  POCT Blood Glucose.: 192 mg/dL (08 Feb 2020 15:35)          RADIOLOGY & ADDITIONAL TESTS:            Imaging Personally Reviewed:  [ ] YES  [ ] NO    Consultants involved in case:   Consultant(s) Notes Reviewed:  [ ] YES  [ ] NO:   Care Discussed with Consultants/Other Providers [ ] YES  [ ] NO ***************************************************************  Dr. Kellie Bernal  Internal Medicine   [facility] Pager: [phone]  [facility] Pager: [phone]   ***************************************************************    AFTAB BASS  64y  MRN: [id number]    Subjective:    Patient is a 64y old  Female who presents with a chief complaint of change in mental status (08 Feb 2020 07:43)      Interval history/overnight events:    FLORA ON. Refusing vitals and fingersticks this AM. Denies fevers, chills, sweats, cp, SOB. Agitated, delirious Refusing exam.       MEDICATIONS  (STANDING):  budesonide  80 MICROgram(s)/formoterol 4.5 MICROgram(s) Inhaler 2 Puff(s) Inhalation two times a day  dextrose 5%. 1000 milliLiter(s) (50 mL/Hr) IV Continuous <Continuous>  dextrose 50% Injectable 12.5 Gram(s) IV Push once  dextrose 50% Injectable 25 Gram(s) IV Push once  dextrose 50% Injectable 25 Gram(s) IV Push once  gabapentin 400 milliGRAM(s) Oral three times a day  influenza   Vaccine 0.5 milliLiter(s) IntraMuscular once  insulin glargine Injectable (LANTUS) 34 Unit(s) SubCutaneous every morning  insulin lispro (HumaLOG) corrective regimen sliding scale   SubCutaneous three times a day before meals  insulin lispro (HumaLOG) corrective regimen sliding scale   SubCutaneous at bedtime  insulin lispro Injectable (HumaLOG) 18 Unit(s) SubCutaneous three times a day with meals  levothyroxine 175 MICROGram(s) Oral daily  metoprolol succinate ER 50 milliGRAM(s) Oral daily  pantoprazole    Tablet 40 milliGRAM(s) Oral before breakfast  predniSONE   Tablet 15 milliGRAM(s) Oral daily  primidone 50 milliGRAM(s) Oral at bedtime  simvastatin 20 milliGRAM(s) Oral at bedtime    MEDICATIONS  (PRN):  acetaminophen   Tablet .. 650 milliGRAM(s) Oral every 6 hours PRN Temp greater or equal to 38C (100.4F), Mild Pain (1 - 3), Moderate Pain (4 - 6)  albuterol/ipratropium for Nebulization 3 milliLiter(s) Nebulizer every 6 hours PRN Shortness of Breath and/or Wheezing  dextrose 40% Gel 15 Gram(s) Oral once PRN Blood Glucose LESS THAN 70 milliGRAM(s)/deciLiter  glucagon  Injectable 1 milliGRAM(s) IntraMuscular once PRN Glucose <70 milliGRAM(s)/deciLiter  haloperidol    Injectable 2 milliGRAM(s) IntraMuscular every 4 hours PRN Agitation  haloperidol    Injectable 2 milliGRAM(s) IV Push every 4 hours PRN Agitation  melatonin 3 milliGRAM(s) Oral at bedtime PRN Insomnia  naproxen 250 milliGRAM(s) Oral every 6 hours PRN Moderate Pain (4 - 6)  traMADol 100 milliGRAM(s) Oral three times a day PRN Severe Pain (7 - 10)        Objective:    Vitals: Vital Signs Last 24 Hrs  T(C): 36.8 (02-09-20 @ 08:21), Max: 36.8 (02-09-20 @ 08:21)  T(F): 98.2 (02-09-20 @ 08:21), Max: 98.2 (02-09-20 @ 08:21)  HR: 86 (02-09-20 @ 08:21) (71 - 90)  BP: 139/- (02-09-20 @ 08:21) (139/- - 145/91)  BP(mean): --  RR: 18 (02-09-20 @ 08:21) (18 - 18)  SpO2: 97% (02-09-20 @ 08:21) (97% - 98%)            I&O's Summary    PHYSICAL EXAM: declined full exam   GENERAL: elderly female, agitated and delirious respiring on 3L   HEENT: PERRL, no scleral icterus, no head and neck lad   CHEST/LUNG: not assessed   HEART: Not assessed   ABDOMEN: Not assessed   SKIN: skin thickening over lower abdomen at eviscerated SBO site; patient refusing further eval   NERVOUS SYSTEM: Alert & Oriented X3   PSYCH: agitated, delirious (though more approachable than before)         LABS:                              CAPILLARY BLOOD GLUCOSE      POCT Blood Glucose.: 165 mg/dL (09 Feb 2020 08:18)  POCT Blood Glucose.: 195 mg/dL (08 Feb 2020 18:51)  POCT Blood Glucose.: 192 mg/dL (08 Feb 2020 15:35)          RADIOLOGY & ADDITIONAL TESTS:            Imaging Personally Reviewed:  [ ] YES  [ ] NO    Consultants involved in case:   Consultant(s) Notes Reviewed:  [ ] YES  [ ] NO:   Care Discussed with Consultants/Other Providers [ ] YES  [ ] NO

## 2020-02-09 NOTE — PROGRESS NOTE ADULT - PROBLEM SELECTOR PLAN 1
Unclear etiology - possibly from 3mm right sided SDH ided on MRI brain; autoimmune encephalitis 2/2 longstanding RA less likely as MRI brain neg for inflammatory changes, from hypothyroidism less likely given correction of TSH/FT4 with minimal change in behavior vs. possible praneoplastic syndrome, vs other psychiatric or infectious etiology  - s/p LP 2/6 w/ sedation; CSF studies w/ 1 nucleated cell; gram stain w/ few PMNs, no orgs; CSF cultures NGTD; CSF PCR, cryptococcal antigen neg, HSV PCR negative   - s/p MRI 2/6 brain revealing for subacute 3mm right holohemispheric subdural hematoma   - neurosurgery recs appreciated: no surgical intervention at this time, repeat CT head w/ change in exam   - rheumatology recs appreciated: less likely rheumatological origin as MRI brain neg for inflammatory changes; serum ALESSIA, ZAFAR, ribosomal P, dsdna neg, NMDRAb neg   - psych recs appreciated: hold olanzapine given , Haldol 2mg IV or IM Q4H for agitation  - f/u CSF autoimmune and paraneoplastic panels, VDRL, Borrelia, NMDA R ab   - DVT ppx changed to SCDs given bleed Unclear etiology - possibly from 3mm right sided SDH ided on MRI brain; autoimmune encephalitis 2/2 longstanding RA less likely as MRI brain neg for inflammatory changes, from hypothyroidism less likely given correction of TSH/FT4 with minimal change in behavior vs. possible praneoplastic syndrome, vs other psychiatric or infectious etiology  - s/p LP 2/6 w/ sedation; CSF studies w/ 1 nucleated cell; gram stain w/ few PMNs, no orgs; CSF cultures NGTD; CSF PCR, cryptococcal antigen neg, HSV PCR negative   - s/p MRI 2/6 brain revealing for subacute 3mm right holohemispheric subdural hematoma   - neurosurgery recs appreciated: no surgical intervention at this time, repeat CT head w/ change in exam   - rheumatology recs appreciated: less likely rheumatological origin as MRI brain neg for inflammatory changes; serum ALESSIA, ZAFAR, ribosomal P, dsdna neg, NMDRAb neg   - psych recs appreciated: hold olanzapine given , Haldol 2mg IV or IM Q4H for agitation  - f/u CSF autoimmune and paraneoplastic panels, VDRL, Borrelia, NMDA R ab   - f/u psych in AM to determine dispo   - DVT ppx changed to SCDs given bleed

## 2020-02-09 NOTE — PROGRESS NOTE ADULT - PROBLEM SELECTOR PLAN 5
- Bcx + for GPR on 1/12, 1 out of 2 bottles   -+Bcx from 1/12 sent to [facility] laboratories, currently no results  - Blood cx obtained on 1/25 no growth X 5 days.  - Patient noted to have leukocytosis, but remains afebrile   - will monitor off abx for now as repeat cultures ngtd

## 2020-02-09 NOTE — PROGRESS NOTE ADULT - ASSESSMENT
64F w/ a PMHx arthritis, hypothyroidism, pulmonary HTN, DM on insulin, COPD/CHRIS on noctournal CPAP and 3L home), RA on chronic prednisone, chronic tremors, SBO s/p ex lap with lysis of adhesions c/b evisceration 2/2 a coughing episode s/p repeat ex lap c/b multifocal pneumonia and hypercapnic respiratory failure requiring intubation 1/6/19, s/p bronch 1/9/19, and extubated on 1/24/19; further c/b Enterococci bacteremia), p/w agitation 3 weeks of unclear etiology, found to be significantly hypothyroid (), extremely combative and paranoid of family and healthcare staff, s/p MRI brain and LP found to have small right sided SDH, course c/b hyperglycemia

## 2020-02-09 NOTE — PROGRESS NOTE ADULT - PROBLEM SELECTOR PLAN 4
Patient w/ anterior abdominal wound from SBO revision   - wound care following; change dressing every 3 days  - patient refusing dressing changes per nursing and exam by physician   - s/p dressing change 2/6  - risk of infection discussed with ana maría Pisano  - CT for s&s of infection Patient w/ anterior abdominal wound from SBO revision   - wound care following; change dressing every 3 days  - patient refusing dressing changes per nursing and exam by physician   - s/p dressing change 2/6; pending change today   - risk of infection discussed with ana maría NEGRETE for s&s of infection

## 2020-02-09 NOTE — PROGRESS NOTE ADULT - PROBLEM SELECTOR PLAN 6
- Moderate pulmonary HTN per TTE (1/13)  - Per son, sildenafil was discontinued   - Continue with Toprol 50 - Moderate pulmonary HTN per TTE (1/13)  - Per son, sildenafil was discontinued   - Continue with metoprolol 50mg

## 2020-02-09 NOTE — PROGRESS NOTE ADULT - PROBLEM SELECTOR PLAN 9
- DVT ppx: SWITCHED TO SCDS GIVEN BLEED  - Diet: DASH diet w/evening snack  - Dispo: Pending  - ADDENDUM: Spoke to pts family members about decision making.  The children agree that Norris will serve as the point of  and will be the final decision maker representing the family for when decisions have to be made.  They are in agreement with moving forward with sedation/potential intubation for pt to undergo further medical testing. Discussions had on 2/3/20. - Davis Kramer - DVT ppx: SWITCHED TO SCDS GIVEN BLEED  - Diet: DASH diet w/evening snack  - Dispo: Pending psych re-eval   - ADDENDUM: Spoke to pts family members about decision making.  The children agree that Norris will serve as the point of  and will be the final decision maker representing the family for when decisions have to be made.  They are in agreement with moving forward with sedation/potential intubation for pt to undergo further medical testing. Discussions had on 2/3/20. - Davis Kramer

## 2020-02-09 NOTE — PROGRESS NOTE ADULT - ATTENDING COMMENTS
Patient with acute encephalopathy (please see section above). Numerous autoimmune/ rheumatologic, infectious, neurologic testing with has been largely unremarkable. Some results are still pending from LP, however less likely to abnormal. Patient may need to be transitioned to inpatient psych vs. rehab. Will f/u with psych in regards to disposition for dc planning.    Orquidea Rowland D.O.  Hospitalist Pager # [phone]

## 2020-02-10 LAB
GLUCOSE BLDC GLUCOMTR-MCNC: 176 MG/DL — HIGH (ref 70–99)
GLUCOSE BLDC GLUCOMTR-MCNC: 216 MG/DL — HIGH (ref 70–99)
GLUCOSE BLDC GLUCOMTR-MCNC: 68 MG/DL — LOW (ref 70–99)
GLUCOSE BLDC GLUCOMTR-MCNC: 69 MG/DL — LOW (ref 70–99)
GLUCOSE BLDC GLUCOMTR-MCNC: 74 MG/DL — SIGNIFICANT CHANGE UP (ref 70–99)
GLUCOSE BLDC GLUCOMTR-MCNC: 89 MG/DL — SIGNIFICANT CHANGE UP (ref 70–99)
WNV IGG CSF IA-ACNC: NEGATIVE — SIGNIFICANT CHANGE UP
WNV IGM CSF IA-ACNC: NEGATIVE — SIGNIFICANT CHANGE UP

## 2020-02-10 PROCEDURE — 99233 SBSQ HOSP IP/OBS HIGH 50: CPT | Mod: GC

## 2020-02-10 PROCEDURE — 99233 SBSQ HOSP IP/OBS HIGH 50: CPT

## 2020-02-10 PROCEDURE — 99232 SBSQ HOSP IP/OBS MODERATE 35: CPT

## 2020-02-10 RX ORDER — DEXTROSE 50 % IN WATER 50 %
25 SYRINGE (ML) INTRAVENOUS ONCE
Refills: 0 | Status: DISCONTINUED | OUTPATIENT
Start: 2020-02-10 | End: 2020-02-10

## 2020-02-10 RX ORDER — DIVALPROEX SODIUM 500 MG/1
250 TABLET, DELAYED RELEASE ORAL DAILY
Refills: 0 | Status: DISCONTINUED | OUTPATIENT
Start: 2020-02-10 | End: 2020-02-11

## 2020-02-10 RX ORDER — DIVALPROEX SODIUM 500 MG/1
500 TABLET, DELAYED RELEASE ORAL DAILY
Refills: 0 | Status: DISCONTINUED | OUTPATIENT
Start: 2020-02-10 | End: 2020-02-11

## 2020-02-10 RX ORDER — DIVALPROEX SODIUM 500 MG/1
250 TABLET, DELAYED RELEASE ORAL DAILY
Refills: 0 | Status: DISCONTINUED | OUTPATIENT
Start: 2020-02-10 | End: 2020-02-10

## 2020-02-10 RX ORDER — DIVALPROEX SODIUM 500 MG/1
500 TABLET, DELAYED RELEASE ORAL AT BEDTIME
Refills: 0 | Status: DISCONTINUED | OUTPATIENT
Start: 2020-02-10 | End: 2020-02-10

## 2020-02-10 RX ORDER — DIVALPROEX SODIUM 500 MG/1
250 TABLET, DELAYED RELEASE ORAL ONCE
Refills: 0 | Status: DISCONTINUED | OUTPATIENT
Start: 2020-02-10 | End: 2020-02-10

## 2020-02-10 RX ORDER — INSULIN LISPRO 100/ML
9 VIAL (ML) SUBCUTANEOUS
Refills: 0 | Status: DISCONTINUED | OUTPATIENT
Start: 2020-02-10 | End: 2020-02-12

## 2020-02-10 RX ORDER — DIVALPROEX SODIUM 500 MG/1
500 TABLET, DELAYED RELEASE ORAL ONCE
Refills: 0 | Status: DISCONTINUED | OUTPATIENT
Start: 2020-02-10 | End: 2020-02-10

## 2020-02-10 RX ADMIN — GABAPENTIN 400 MILLIGRAM(S): 400 CAPSULE ORAL at 21:52

## 2020-02-10 RX ADMIN — GABAPENTIN 400 MILLIGRAM(S): 400 CAPSULE ORAL at 17:41

## 2020-02-10 RX ADMIN — Medication 15 MILLIGRAM(S): at 08:42

## 2020-02-10 RX ADMIN — TRAMADOL HYDROCHLORIDE 100 MILLIGRAM(S): 50 TABLET ORAL at 09:40

## 2020-02-10 RX ADMIN — BUDESONIDE AND FORMOTEROL FUMARATE DIHYDRATE 2 PUFF(S): 160; 4.5 AEROSOL RESPIRATORY (INHALATION) at 05:41

## 2020-02-10 RX ADMIN — BUDESONIDE AND FORMOTEROL FUMARATE DIHYDRATE 2 PUFF(S): 160; 4.5 AEROSOL RESPIRATORY (INHALATION) at 17:31

## 2020-02-10 RX ADMIN — TRAMADOL HYDROCHLORIDE 100 MILLIGRAM(S): 50 TABLET ORAL at 08:43

## 2020-02-10 RX ADMIN — HALOPERIDOL DECANOATE 2 MILLIGRAM(S): 100 INJECTION INTRAMUSCULAR at 08:42

## 2020-02-10 RX ADMIN — Medication 50 MILLIGRAM(S): at 08:42

## 2020-02-10 RX ADMIN — INSULIN GLARGINE 34 UNIT(S): 100 INJECTION, SOLUTION SUBCUTANEOUS at 08:41

## 2020-02-10 RX ADMIN — PRIMIDONE 50 MILLIGRAM(S): 250 TABLET ORAL at 21:52

## 2020-02-10 RX ADMIN — DIVALPROEX SODIUM 250 MILLIGRAM(S): 500 TABLET, DELAYED RELEASE ORAL at 17:41

## 2020-02-10 RX ADMIN — PANTOPRAZOLE SODIUM 40 MILLIGRAM(S): 20 TABLET, DELAYED RELEASE ORAL at 08:42

## 2020-02-10 RX ADMIN — SIMVASTATIN 20 MILLIGRAM(S): 20 TABLET, FILM COATED ORAL at 21:52

## 2020-02-10 RX ADMIN — Medication 175 MICROGRAM(S): at 08:42

## 2020-02-10 RX ADMIN — GABAPENTIN 400 MILLIGRAM(S): 400 CAPSULE ORAL at 08:41

## 2020-02-10 RX ADMIN — Medication 18 UNIT(S): at 08:41

## 2020-02-10 RX ADMIN — Medication 2: at 08:43

## 2020-02-10 NOTE — PROGRESS NOTE ADULT - PROBLEM SELECTOR PLAN 1
Unclear etiology - possibly from 3mm right sided SDH ided on MRI brain; autoimmune encephalitis 2/2 longstanding RA less likely as MRI brain neg for inflammatory changes, from hypothyroidism less likely given correction of TSH/FT4 with minimal change in behavior vs. possible praneoplastic syndrome, vs other psychiatric or infectious etiology  - s/p LP 2/6 w/ sedation; CSF studies w/ 1 nucleated cell; gram stain w/ few PMNs, no orgs; CSF cultures NGTD; CSF PCR, cryptococcal antigen neg, HSV PCR negative   - s/p MRI 2/6 brain revealing for subacute 3mm right holohemispheric subdural hematoma   - neurosurgery recs appreciated: no surgical intervention at this time, repeat CT head w/ change in exam   - rheumatology recs appreciated: less likely rheumatological origin as MRI brain neg for inflammatory changes; serum ALESSIA, ZAFAR, ribosomal P, dsdna neg, NMDRAb neg   - psych recs appreciated: hold olanzapine given , Haldol 2mg IV or IM Q4H for agitation  - f/u CSF autoimmune and paraneoplastic panels, VDRL, Borrelia, NMDA R ab   - f/u psych in AM to determine dispo   - DVT ppx changed to SCDs given bleed Unclear etiology - possibly from 3mm right sided SDH ided on MRI brain; autoimmune encephalitis 2/2 longstanding RA less likely as MRI brain neg for inflammatory changes, from hypothyroidism less likely given correction of TSH/FT4 with minimal change in behavior vs. possible praneoplastic syndrome, vs other psychiatric or infectious etiology  - s/p LP 2/6 w/ sedation; CSF studies w/ 1 nucleated cell; gram stain w/ few PMNs, no orgs; CSF cultures NGTD; CSF PCR, cryptococcal antigen neg, HSV PCR negative   - s/p MRI 2/6 brain revealing for subacute 3mm right holohemispheric subdural hematoma   - neurosurgery recs appreciated: no surgical intervention at this time, repeat CT head w/ change in exam   - rheumatology recs appreciated: less likely rheumatological origin as MRI brain neg for inflammatory changes; serum ALESSIA, ZAFAR, ribosomal P, dsdna neg, NMDRAb neg   - psych recs appreciated: start depakote 250 qday, 500 qhs, Haldol 2mg IV or IM Q4H for agitation, TFTs/LFTs in 3 days, holding olanzapine ; no psychiatric CI to discharge   - f/u CSF autoimmune and paraneoplastic panels, VDRL, Borrelia, NMDA R ab   - DVT ppx changed to SCDs given bleed

## 2020-02-10 NOTE — PROGRESS NOTE ADULT - PROBLEM SELECTOR PLAN 2
-C/w levothyroxine 175 MICROGram(s) Oral daily  -Recheck in 4-6 weeks.  -Staff to try as much as possible to give on an empty stomach  -Plan discussed with pt/team. If pt allows IV access can consider switching dose to IV.   Contact info: [phone] (24/7). pager [phone]

## 2020-02-10 NOTE — PROGRESS NOTE ADULT - PROBLEM SELECTOR PLAN 7
-  with slight AG (18), BHB 4 (elevated) on presentation  - hospital course complicated by FBGs in 300s; now better controlled   - Per endocrine, c/w lantus 34 + Humalog 18 TID with meals, mod ISS since on steroids -  with slight AG (18), BHB 4 (elevated) on presentation  - hospital course complicated by FBGs in 300s; now better controlled   - Per endocrine, c/w lantus 34 + Humalog 18 TID with meals, mod ISS since on steroids --> given hypoglycemia, reduced humalog to 9 tid for now.

## 2020-02-10 NOTE — PROVIDER CONTACT NOTE (CHANGE IN STATUS NOTIFICATION) - SITUATION
Patient refusing Zocor and primidone. patient is combative. Patient refusing Zocor and primidone. patient is combative. patient .

## 2020-02-10 NOTE — PROGRESS NOTE ADULT - SUBJECTIVE AND OBJECTIVE BOX
***************************************************************  Dr. Kellie Bernal  Internal Medicine   [facility] Pager: [phone]  [facility] Pager: [phone]   ***************************************************************    AFTAB BASS  64y  MRN: [id number]    Subjective:    Patient is a 64y old  Female who presents with a chief complaint of change in mental status (09 Feb 2020 08:31)      Interval history/overnight events:    FLORA       MEDICATIONS  (STANDING):  budesonide  80 MICROgram(s)/formoterol 4.5 MICROgram(s) Inhaler 2 Puff(s) Inhalation two times a day  dextrose 5%. 1000 milliLiter(s) (50 mL/Hr) IV Continuous <Continuous>  dextrose 50% Injectable 12.5 Gram(s) IV Push once  dextrose 50% Injectable 25 Gram(s) IV Push once  dextrose 50% Injectable 25 Gram(s) IV Push once  gabapentin 400 milliGRAM(s) Oral three times a day  influenza   Vaccine 0.5 milliLiter(s) IntraMuscular once  insulin glargine Injectable (LANTUS) 34 Unit(s) SubCutaneous every morning  insulin lispro (HumaLOG) corrective regimen sliding scale   SubCutaneous three times a day before meals  insulin lispro (HumaLOG) corrective regimen sliding scale   SubCutaneous at bedtime  insulin lispro Injectable (HumaLOG) 18 Unit(s) SubCutaneous three times a day with meals  levothyroxine 175 MICROGram(s) Oral daily  metoprolol succinate ER 50 milliGRAM(s) Oral daily  pantoprazole    Tablet 40 milliGRAM(s) Oral before breakfast  predniSONE   Tablet 15 milliGRAM(s) Oral daily  primidone 50 milliGRAM(s) Oral at bedtime  simvastatin 20 milliGRAM(s) Oral at bedtime    MEDICATIONS  (PRN):  acetaminophen   Tablet .. 650 milliGRAM(s) Oral every 6 hours PRN Temp greater or equal to 38C (100.4F), Mild Pain (1 - 3), Moderate Pain (4 - 6)  albuterol/ipratropium for Nebulization 3 milliLiter(s) Nebulizer every 6 hours PRN Shortness of Breath and/or Wheezing  dextrose 40% Gel 15 Gram(s) Oral once PRN Blood Glucose LESS THAN 70 milliGRAM(s)/deciLiter  glucagon  Injectable 1 milliGRAM(s) IntraMuscular once PRN Glucose <70 milliGRAM(s)/deciLiter  haloperidol    Injectable 2 milliGRAM(s) IntraMuscular every 4 hours PRN Agitation  haloperidol    Injectable 2 milliGRAM(s) IV Push every 4 hours PRN Agitation  melatonin 3 milliGRAM(s) Oral at bedtime PRN Insomnia  naproxen 250 milliGRAM(s) Oral every 6 hours PRN Moderate Pain (4 - 6)  traMADol 100 milliGRAM(s) Oral three times a day PRN Severe Pain (7 - 10)        Objective:    Vitals: Vital Signs Last 24 Hrs  T(C): 36.6 (02-09-20 @ 23:25), Max: 36.8 (02-09-20 @ 08:21)  T(F): 97.8 (02-09-20 @ 23:25), Max: 98.2 (02-09-20 @ 08:21)  HR: 71 (02-10-20 @ 05:45) (71 - 104)  BP: 118/79 (02-09-20 @ 23:25) (118/79 - 139/82)  BP(mean): --  RR: 18 (02-09-20 @ 23:25) (18 - 18)  SpO2: 98% (02-10-20 @ 05:45) (97% - 98%)            I&O's Summary    09 Feb 2020 07:01  -  10 Feb 2020 07:00  --------------------------------------------------------  IN: 960 mL / OUT: 0 mL / NET: 960 mL        PHYSICAL EXAM:  GENERAL: NAD  HEENT: PERRL, no scleral icterus, no head and neck lad   CHEST/LUNG: CTAB, no wheezing, crackles, or ronchi   HEART: RRR, normal S1, S2, no murmurs, gallops, or rubs appreciated   ABDOMEN: soft, nondistended, non-tender, normoactive, no HSM, no rebound, no guarding, no rigidity  SKIN: No rashes or lesions  NERVOUS SYSTEM: Alert & Oriented X3  PSYCH: calm and cooperative     LABS:                              CAPILLARY BLOOD GLUCOSE      POCT Blood Glucose.: 74 mg/dL (10 Feb 2020 00:17)  POCT Blood Glucose.: 171 mg/dL (09 Feb 2020 20:00)  POCT Blood Glucose.: 243 mg/dL (09 Feb 2020 15:24)          RADIOLOGY & ADDITIONAL TESTS:            Imaging Personally Reviewed:  [ ] YES  [ ] NO    Consultants involved in case:   Consultant(s) Notes Reviewed:  [ ] YES  [ ] NO:   Care Discussed with Consultants/Other Providers [ ] YES  [ ] NO ***************************************************************  Dr. Kellie Bernal  Internal Medicine   [facility] Pager: [phone]  [facility] Pager: [phone]   ***************************************************************    AFTAB BASS  64y  MRN: [id number]    Subjective:    Patient is a 64y old  Female who presents with a chief complaint of change in mental status (09 Feb 2020 08:31)      Interval history/overnight events:    ON, declining medication and fingersticks. This AM, again agitated and delirious. Denies cp, sob, abdominal pain, fevers, chills, sweats.       MEDICATIONS  (STANDING):  budesonide  80 MICROgram(s)/formoterol 4.5 MICROgram(s) Inhaler 2 Puff(s) Inhalation two times a day  dextrose 5%. 1000 milliLiter(s) (50 mL/Hr) IV Continuous <Continuous>  dextrose 50% Injectable 12.5 Gram(s) IV Push once  dextrose 50% Injectable 25 Gram(s) IV Push once  dextrose 50% Injectable 25 Gram(s) IV Push once  gabapentin 400 milliGRAM(s) Oral three times a day  influenza   Vaccine 0.5 milliLiter(s) IntraMuscular once  insulin glargine Injectable (LANTUS) 34 Unit(s) SubCutaneous every morning  insulin lispro (HumaLOG) corrective regimen sliding scale   SubCutaneous three times a day before meals  insulin lispro (HumaLOG) corrective regimen sliding scale   SubCutaneous at bedtime  insulin lispro Injectable (HumaLOG) 18 Unit(s) SubCutaneous three times a day with meals  levothyroxine 175 MICROGram(s) Oral daily  metoprolol succinate ER 50 milliGRAM(s) Oral daily  pantoprazole    Tablet 40 milliGRAM(s) Oral before breakfast  predniSONE   Tablet 15 milliGRAM(s) Oral daily  primidone 50 milliGRAM(s) Oral at bedtime  simvastatin 20 milliGRAM(s) Oral at bedtime    MEDICATIONS  (PRN):  acetaminophen   Tablet .. 650 milliGRAM(s) Oral every 6 hours PRN Temp greater or equal to 38C (100.4F), Mild Pain (1 - 3), Moderate Pain (4 - 6)  albuterol/ipratropium for Nebulization 3 milliLiter(s) Nebulizer every 6 hours PRN Shortness of Breath and/or Wheezing  dextrose 40% Gel 15 Gram(s) Oral once PRN Blood Glucose LESS THAN 70 milliGRAM(s)/deciLiter  glucagon  Injectable 1 milliGRAM(s) IntraMuscular once PRN Glucose <70 milliGRAM(s)/deciLiter  haloperidol    Injectable 2 milliGRAM(s) IntraMuscular every 4 hours PRN Agitation  haloperidol    Injectable 2 milliGRAM(s) IV Push every 4 hours PRN Agitation  melatonin 3 milliGRAM(s) Oral at bedtime PRN Insomnia  naproxen 250 milliGRAM(s) Oral every 6 hours PRN Moderate Pain (4 - 6)  traMADol 100 milliGRAM(s) Oral three times a day PRN Severe Pain (7 - 10)        Objective:    Vitals: Vital Signs Last 24 Hrs  T(C): 36.6 (02-09-20 @ 23:25), Max: 36.8 (02-09-20 @ 08:21)  T(F): 97.8 (02-09-20 @ 23:25), Max: 98.2 (02-09-20 @ 08:21)  HR: 71 (02-10-20 @ 05:45) (71 - 104)  BP: 118/79 (02-09-20 @ 23:25) (118/79 - 139/82)  BP(mean): --  RR: 18 (02-09-20 @ 23:25) (18 - 18)  SpO2: 98% (02-10-20 @ 05:45) (97% - 98%)            I&O's Summary    09 Feb 2020 07:01  -  10 Feb 2020 07:00  --------------------------------------------------------  IN: 960 mL / OUT: 0 mL / NET: 960 mL    PHYSICAL EXAM:  GENERAL: elderly female, agitated and delirious respiring on 3L   HEENT: PERRL, no scleral icterus, no head and neck lad   CHEST/LUNG: CTAB, no wheezing, rhonchi   HEART: RRR, NMGR   ABDOMEN: soft, distended, normoactive, nontender to palpation   SKIN: skin thickening over lower abdomen at eviscerated SBO site; patient refusing further eval   NERVOUS SYSTEM: Alert & Oriented X3   PSYCH: agitated, delirious (though more approachable than before)     CAPILLARY BLOOD GLUCOSE      POCT Blood Glucose.: 74 mg/dL (10 Feb 2020 00:17)  POCT Blood Glucose.: 171 mg/dL (09 Feb 2020 20:00)  POCT Blood Glucose.: 243 mg/dL (09 Feb 2020 15:24)          RADIOLOGY & ADDITIONAL TESTS:            Imaging Personally Reviewed:  [ x] YES  [ ] NO    Consultants involved in case:   Consultant(s) Notes Reviewed:  [x ] YES  [ ] NO:   Care Discussed with Consultants/Other Providers [ x] YES  [ ] NO ***************************************************************  Dr. Kellie Bernal  Internal Medicine   [facility] Pager: [phone]  [facility] Pager: [phone]   ***************************************************************    AFTAB BASS  64y  MRN: [id number]    Subjective:    Patient is a 64y old  Female who presents with a chief complaint of change in mental status (09 Feb 2020 08:31)      Interval history/overnight events:    ON, declining medication and fingersticks. This AM, again agitated and delirious. Denies cp, sob, abdominal pain, fevers, chills, sweats.     12 point ros negative except for above    MEDICATIONS  (STANDING):  budesonide  80 MICROgram(s)/formoterol 4.5 MICROgram(s) Inhaler 2 Puff(s) Inhalation two times a day  dextrose 5%. 1000 milliLiter(s) (50 mL/Hr) IV Continuous <Continuous>  dextrose 50% Injectable 12.5 Gram(s) IV Push once  dextrose 50% Injectable 25 Gram(s) IV Push once  dextrose 50% Injectable 25 Gram(s) IV Push once  gabapentin 400 milliGRAM(s) Oral three times a day  influenza   Vaccine 0.5 milliLiter(s) IntraMuscular once  insulin glargine Injectable (LANTUS) 34 Unit(s) SubCutaneous every morning  insulin lispro (HumaLOG) corrective regimen sliding scale   SubCutaneous three times a day before meals  insulin lispro (HumaLOG) corrective regimen sliding scale   SubCutaneous at bedtime  insulin lispro Injectable (HumaLOG) 18 Unit(s) SubCutaneous three times a day with meals  levothyroxine 175 MICROGram(s) Oral daily  metoprolol succinate ER 50 milliGRAM(s) Oral daily  pantoprazole    Tablet 40 milliGRAM(s) Oral before breakfast  predniSONE   Tablet 15 milliGRAM(s) Oral daily  primidone 50 milliGRAM(s) Oral at bedtime  simvastatin 20 milliGRAM(s) Oral at bedtime    MEDICATIONS  (PRN):  acetaminophen   Tablet .. 650 milliGRAM(s) Oral every 6 hours PRN Temp greater or equal to 38C (100.4F), Mild Pain (1 - 3), Moderate Pain (4 - 6)  albuterol/ipratropium for Nebulization 3 milliLiter(s) Nebulizer every 6 hours PRN Shortness of Breath and/or Wheezing  dextrose 40% Gel 15 Gram(s) Oral once PRN Blood Glucose LESS THAN 70 milliGRAM(s)/deciLiter  glucagon  Injectable 1 milliGRAM(s) IntraMuscular once PRN Glucose <70 milliGRAM(s)/deciLiter  haloperidol    Injectable 2 milliGRAM(s) IntraMuscular every 4 hours PRN Agitation  haloperidol    Injectable 2 milliGRAM(s) IV Push every 4 hours PRN Agitation  melatonin 3 milliGRAM(s) Oral at bedtime PRN Insomnia  naproxen 250 milliGRAM(s) Oral every 6 hours PRN Moderate Pain (4 - 6)  traMADol 100 milliGRAM(s) Oral three times a day PRN Severe Pain (7 - 10)        Objective:    Vitals: Vital Signs Last 24 Hrs  T(C): 36.6 (02-09-20 @ 23:25), Max: 36.8 (02-09-20 @ 08:21)  T(F): 97.8 (02-09-20 @ 23:25), Max: 98.2 (02-09-20 @ 08:21)  HR: 71 (02-10-20 @ 05:45) (71 - 104)  BP: 118/79 (02-09-20 @ 23:25) (118/79 - 139/82)  BP(mean): --  RR: 18 (02-09-20 @ 23:25) (18 - 18)  SpO2: 98% (02-10-20 @ 05:45) (97% - 98%)            I&O's Summary    09 Feb 2020 07:01  -  10 Feb 2020 07:00  --------------------------------------------------------  IN: 960 mL / OUT: 0 mL / NET: 960 mL    PHYSICAL EXAM:  GENERAL: elderly female, agitated and delirious respiring on 3L   HEENT: PERRL, no scleral icterus, no head and neck lad   CHEST/LUNG: CTAB, no wheezing, rhonchi   HEART: RRR, No MRG  ABDOMEN: soft, distended, normoactive, nontender to palpation   SKIN: skin thickening over lower abdomen with chronic wound underneath pad; patient refusing further eval   NERVOUS SYSTEM: Alert & Oriented X1  PSYCH: agitated, delirious (though more approachable than before)     CAPILLARY BLOOD GLUCOSE      POCT Blood Glucose.: 74 mg/dL (10 Feb 2020 00:17)  POCT Blood Glucose.: 171 mg/dL (09 Feb 2020 20:00)  POCT Blood Glucose.: 243 mg/dL (09 Feb 2020 15:24)          RADIOLOGY & ADDITIONAL TESTS:            Imaging Personally Reviewed:  [ x] YES  [ ] NO    Consultants involved in case:   Consultant(s) Notes Reviewed:  [x ] YES  [ ] NO:   Care Discussed with Consultants/Other Providers [ x] YES  [ ] NO

## 2020-02-10 NOTE — PROGRESS NOTE BEHAVIORAL HEALTH - NSBHCONSULTRECOMMENDOTHER_PSY_A_CORE FT
1) as per previous recommendation for mood instability, consider depakote 250 mg Qdaily and 500 mg qhs; would obtain VPA level and LFTs after 3 days  2) for acute severe  agitation, continue haldol 2 mg IM Q4hr PRN, f/u QTc < 500  3) pt's confusion likely metabolic in nature  4) pt lacks capacity to refuse treatments, defer to pt's HCP

## 2020-02-10 NOTE — PROGRESS NOTE ADULT - PROBLEM SELECTOR PLAN 5
- Bcx + for GPR on 1/12, 1 out of 2 bottles   -+Bcx from 1/12 sent to [facility] laboratories, currently no results  - Blood cx obtained on 1/25 no growth X 5 days.  - Patient noted to have leukocytosis, but remains afebrile   - will monitor off abx for now as repeat cultures ngtd

## 2020-02-10 NOTE — PROGRESS NOTE ADULT - ATTENDING COMMENTS
Patient seen and examined today. I agree with the above findings, assessment, and plan with the following additions and exceptions:     Acute encephalopathy, unspecified.   No clear single etiology at this point in time. Likely multifactorial. Patient with severe hypothyroidism and SDH on imaging. Noted to have signs of AI at outside hospital. Current back on home dose of prednisone. Undiagnosed infection is a possibility. Per wound care notes, patients wound does not look infected. She has refused further evaluation of her wound. UA unable to be obtained given patients lack of cooperation. Continue to monitor off abx for now. Repeat Bcx with ngtd. CSF VDRL and CSF autoimmune encephalitis panel are pending.   Neuro follow up pending.   Reduce humalog to 9 units TID. If sugars persistently low on repeat, then will discontinue humalog and use SSI and Lantus alone.   Active discussion regarding safest discharge plan being discussed with family throughout admission.   Rest of plan as above    Dr. Avery Dugan DO  Attending Physician  Division of Hospital Medicine  [facility]  Pager:  [phone] Patient seen and examined today. I agree with the above findings, assessment, and plan with the following additions and exceptions:     Acute encephalopathy, unspecified.   No clear single etiology at this point in time. Likely multifactorial. Patient with severe hypothyroidism and SDH on imaging. Noted to have signs of AI at outside hospital. Current back on home dose of prednisone. Undiagnosed infection is a possibility. Per wound care notes, patients wound does not look infected. She has refused further evaluation of her wound. UA unable to be obtained given patients lack of cooperation. Continue to monitor off abx for now. Repeat Bcx with ngtd. CSF VDRL and CSF autoimmune encephalitis panel are pending.   Neuro follow up pending.   Reduce humalog to 9 units TID. If sugars persistently low on repeat, then will discontinue humalog and use SSI and Lantus alone.   All consultation assistance with case greatly appreciated.   Active discussion regarding safest discharge plan being discussed with family throughout admission.   Rest of plan as above    Dr. Avery Dugan DO  Attending Physician  Division of Hospital Medicine  [facility]  Pager:  [phone]

## 2020-02-10 NOTE — PROGRESS NOTE ADULT - ASSESSMENT
63 y/o F w/h/o uncontrolled T2DM with unknown complications. Also h/o functional paraplegia, hypothyroidism, pulmonary hypertension, COPD, RA on chronic prednisone, SBO s/p ex lap with lysis of adhesions c/b multifocal pneumonia. Here with AMS/agitation. Pt still with AMS of unclear etiology and lack of capacity per mental health team. Tolerating POs and taking insulin, Synthroid and Prednisone. However, not eating as much now with rebound hypoglycemia this pm. Glycemic control difficult to achieve due to unpredictable PO intake and now decreased PO intake as well. Pt on chronic Prednisone 15mg daily for RA. Agreed with changes on premeal insulin doses. Will reevaluate on these doses before making further insulin dose changes. BG goal at this time is to prevent further hypoglycemia and persistent hyperglycemia.   Also unable to give Synthroid on an empty stomach all the time because pt takes it with other meds and some times spits out some of her meds. Could consider IV synthroid but not doable at this time since pt doesn't have IV access. Pt refusing blood work since 2/5/20.

## 2020-02-10 NOTE — PROVIDER CONTACT NOTE (CHANGE IN STATUS NOTIFICATION) - SITUATION
Pt. noted agitated, being combative and agitated,  heating and spitting on staff , Haldol x 1 given without any results,  Octavia RN Manager at bed side. , pt. noted with FS 68 before lunch, repeat FS 69, 89 then pt. was combative not able to repeat FS, pt. refused to eat breakfast.

## 2020-02-10 NOTE — PROGRESS NOTE ADULT - PROBLEM SELECTOR PLAN 9
- DVT ppx: SWITCHED TO SCDS GIVEN BLEED  - Diet: DASH diet w/evening snack  - Dispo: Pending psych re-eval   - ADDENDUM: Spoke to pts family members about decision making.  The children agree that Norris will serve as the point of  and will be the final decision maker representing the family for when decisions have to be made.  They are in agreement with moving forward with sedation/potential intubation for pt to undergo further medical testing. Discussions had on 2/3/20. - Davis Kramer

## 2020-02-10 NOTE — PROGRESS NOTE ADULT - PROBLEM SELECTOR PLAN 4
Patient w/ anterior abdominal wound from SBO revision   - wound care following; change dressing every 3 days  - patient refusing dressing changes per nursing and exam by physician   - s/p dressing change 2/6; pending change since 2/9   - risk of infection discussed with ana maría Pisano   - CADEN for s&s of infection Patient w/ anterior abdominal wound from SBO revision   - wound care following; change dressing every 7 days; last 2/6  - patient refusing dressing changes per nursing and exam by physician  - risk of infection discussed with ana maría NEGRETE for s&s of infection Patient w/ anterior abdominal wound from SBO revision   - wound care following; change dressing every 7 days; last 2/6  - patient refusing dressing changes per nursing and exam by physician  - risk of infection discussed with ana maría NEGRETE for s&s of infection w/ son at bedside

## 2020-02-10 NOTE — CHART NOTE - NSCHARTNOTEFT_GEN_A_CORE
Patient seen for nutrition follow up.     Source: Comprehensive chart review, RN     Chart reviewed, events noted. Per chart, pt is a "64 year old female w/ a PMHx arthritis, hypothyroidism, pulmonary HTN, DM on insulin, COPD/CHRIS on noctournal CPAP and 3L home), RA on chronic prednisone, chronic tremors, SBO s/p ex lap with lysis of adhesions c/b evisceration 2/2 a coughing episode s/p repeat ex lap c/b multifocal pneumonia and hypercapnic respiratory failure requiring intubation 1/6/19, s/p bronch 1/9/19, and extubated on 1/24/19; further c/b Enterococci bacteremia), p/w agitation 3 weeks of unclear etiology, found to be significantly hypothyroid (), extremely combative and paranoid of family and healthcare staff, s/p MRI brain and LP found to have small right sided SDH, course c/b hyperglycemia". Noted to be hyperglycemic today, corrected with Po intake per medical team.     Diet : Consistent Carbohydrate (with evening snack)    Per team, pt not appropriate for RD interview. RN reports pt with varying appetite and intake, but eating >50% of tray provided most days. Per chart, eating % of meals. Denies nausea/vomiting, diarrhea/constipation or other acute GI distress. Per chart, pt last BM 2/5.     PO intake : varies, %     Source for PO intake: RN, nursing flowsheets     Enteral/Parenteral Nutrition: none    Daily Weight: 93.3 kg (1/29), 94.3 (1/24, dosing weight)     Pertinent Medications: MEDICATIONS  (STANDING):  budesonide  80 MICROgram(s)/formoterol 4.5 MICROgram(s) Inhaler 2 Puff(s) Inhalation two times a day  dextrose 5%. 1000 milliLiter(s) (50 mL/Hr) IV Continuous <Continuous>  dextrose 50% Injectable 12.5 Gram(s) IV Push once  dextrose 50% Injectable 25 Gram(s) IV Push once  dextrose 50% Injectable 25 Gram(s) IV Push once  diVALproex  milliGRAM(s) Oral once  diVALproex  milliGRAM(s) Oral at bedtime  gabapentin 400 milliGRAM(s) Oral three times a day  influenza   Vaccine 0.5 milliLiter(s) IntraMuscular once  insulin glargine Injectable (LANTUS) 34 Unit(s) SubCutaneous every morning  insulin lispro (HumaLOG) corrective regimen sliding scale   SubCutaneous three times a day before meals  insulin lispro (HumaLOG) corrective regimen sliding scale   SubCutaneous at bedtime  insulin lispro Injectable (HumaLOG) 9 Unit(s) SubCutaneous three times a day before meals  levothyroxine 175 MICROGram(s) Oral daily  metoprolol succinate ER 50 milliGRAM(s) Oral daily  pantoprazole    Tablet 40 milliGRAM(s) Oral before breakfast  predniSONE   Tablet 15 milliGRAM(s) Oral daily  primidone 50 milliGRAM(s) Oral at bedtime  simvastatin 20 milliGRAM(s) Oral at bedtime    MEDICATIONS  (PRN):  acetaminophen   Tablet .. 650 milliGRAM(s) Oral every 6 hours PRN Temp greater or equal to 38C (100.4F), Mild Pain (1 - 3), Moderate Pain (4 - 6)  albuterol/ipratropium for Nebulization 3 milliLiter(s) Nebulizer every 6 hours PRN Shortness of Breath and/or Wheezing  dextrose 40% Gel 15 Gram(s) Oral once PRN Blood Glucose LESS THAN 70 milliGRAM(s)/deciLiter  glucagon  Injectable 1 milliGRAM(s) IntraMuscular once PRN Glucose <70 milliGRAM(s)/deciLiter  haloperidol    Injectable 2 milliGRAM(s) IntraMuscular every 4 hours PRN Agitation  haloperidol    Injectable 2 milliGRAM(s) IV Push every 4 hours PRN Agitation  melatonin 3 milliGRAM(s) Oral at bedtime PRN Insomnia  naproxen 250 milliGRAM(s) Oral every 6 hours PRN Moderate Pain (4 - 6)  traMADol 100 milliGRAM(s) Oral three times a day PRN Severe Pain (7 - 10)    Pertinent Labs:   Finger Sticks:  POCT Blood Glucose.: 89 mg/dL (02-10 @ 14:55)  POCT Blood Glucose.: 69 mg/dL (02-10 @ 14:20)  POCT Blood Glucose.: 68 mg/dL (02-10 @ 14:18)  POCT Blood Glucose.: 176 mg/dL (02-10 @ 08:28)  POCT Blood Glucose.: 74 mg/dL (02-10 @ 00:17)  POCT Blood Glucose.: 171 mg/dL (02-09 @ 20:00)      Skin per nursing documentation: free of pressure injuries  Edema per nursing documentation: 2+ bilateral arm    Estimated Needs:   [ x] no change since previous assessment  [ ] recalculated:     Previous Nutrition Diagnosis: Altered Nutrition Related Lab Values  Nutrition Diagnosis is: ongoing, addressed with therapeutic diet restrictions     New Nutrition Diagnosis: none     Interventions: Will continue to monitor for appropriate time to review nutrition education     Recommend  1) Continue current diet order of Consistent Carbohydrate (with evening snack)  2) RD to add Diet Mighty Shakes x2 daily to increase/optimize PO intake  3) Encourage PO intake and provide feeding assistance PRN     Monitoring and Evaluation:   - Continue to monitor nutritional intake, tolerance to diet prescription, weights, labs, skin integrity  - RD remains available upon request and will follow up per protocol    Lana Engle RD CDN    Pager# [phone] Patient seen for nutrition follow up.     Source: Comprehensive chart review, RN     Chart reviewed, events noted. Per chart, pt is a "64 year old female w/ a PMHx arthritis, hypothyroidism, pulmonary HTN, DM on insulin, COPD/CHRIS on noctournal CPAP and 3L home), RA on chronic prednisone, chronic tremors, SBO s/p ex lap with lysis of adhesions c/b evisceration 2/2 a coughing episode s/p repeat ex lap c/b multifocal pneumonia and hypercapnic respiratory failure requiring intubation 1/6/19, s/p bronch 1/9/19, and extubated on 1/24/19; further c/b Enterococci bacteremia), p/w agitation 3 weeks of unclear etiology, found to be significantly hypothyroid (), extremely combative and paranoid of family and healthcare staff, s/p MRI brain and LP found to have small right sided SDH, course c/b hyperglycemia". Noted to be hypoglycemic today (BG 68), corrected with Po intake per medical team.     Diet : Consistent Carbohydrate (with evening snack)    Per team, pt not appropriate for RD interview. RN reports pt with varying appetite and intake, but eating >50% of tray provided most days. Per chart, eating % of meals. Denies nausea/vomiting, diarrhea/constipation or other acute GI distress. Per chart, pt last BM 2/5.     PO intake : varies, %     Source for PO intake: RN, nursing flowsheets     Enteral/Parenteral Nutrition: none    Daily Weight: 93.3 kg (1/29), 94.3 (1/24, dosing weight)     Pertinent Medications: MEDICATIONS  (STANDING):  budesonide  80 MICROgram(s)/formoterol 4.5 MICROgram(s) Inhaler 2 Puff(s) Inhalation two times a day  dextrose 5%. 1000 milliLiter(s) (50 mL/Hr) IV Continuous <Continuous>  dextrose 50% Injectable 12.5 Gram(s) IV Push once  dextrose 50% Injectable 25 Gram(s) IV Push once  dextrose 50% Injectable 25 Gram(s) IV Push once  diVALproex  milliGRAM(s) Oral once  diVALproex  milliGRAM(s) Oral at bedtime  gabapentin 400 milliGRAM(s) Oral three times a day  influenza   Vaccine 0.5 milliLiter(s) IntraMuscular once  insulin glargine Injectable (LANTUS) 34 Unit(s) SubCutaneous every morning  insulin lispro (HumaLOG) corrective regimen sliding scale   SubCutaneous three times a day before meals  insulin lispro (HumaLOG) corrective regimen sliding scale   SubCutaneous at bedtime  insulin lispro Injectable (HumaLOG) 9 Unit(s) SubCutaneous three times a day before meals  levothyroxine 175 MICROGram(s) Oral daily  metoprolol succinate ER 50 milliGRAM(s) Oral daily  pantoprazole    Tablet 40 milliGRAM(s) Oral before breakfast  predniSONE   Tablet 15 milliGRAM(s) Oral daily  primidone 50 milliGRAM(s) Oral at bedtime  simvastatin 20 milliGRAM(s) Oral at bedtime    MEDICATIONS  (PRN):  acetaminophen   Tablet .. 650 milliGRAM(s) Oral every 6 hours PRN Temp greater or equal to 38C (100.4F), Mild Pain (1 - 3), Moderate Pain (4 - 6)  albuterol/ipratropium for Nebulization 3 milliLiter(s) Nebulizer every 6 hours PRN Shortness of Breath and/or Wheezing  dextrose 40% Gel 15 Gram(s) Oral once PRN Blood Glucose LESS THAN 70 milliGRAM(s)/deciLiter  glucagon  Injectable 1 milliGRAM(s) IntraMuscular once PRN Glucose <70 milliGRAM(s)/deciLiter  haloperidol    Injectable 2 milliGRAM(s) IntraMuscular every 4 hours PRN Agitation  haloperidol    Injectable 2 milliGRAM(s) IV Push every 4 hours PRN Agitation  melatonin 3 milliGRAM(s) Oral at bedtime PRN Insomnia  naproxen 250 milliGRAM(s) Oral every 6 hours PRN Moderate Pain (4 - 6)  traMADol 100 milliGRAM(s) Oral three times a day PRN Severe Pain (7 - 10)    Pertinent Labs:   Finger Sticks:  POCT Blood Glucose.: 89 mg/dL (02-10 @ 14:55)  POCT Blood Glucose.: 69 mg/dL (02-10 @ 14:20)  POCT Blood Glucose.: 68 mg/dL (02-10 @ 14:18)  POCT Blood Glucose.: 176 mg/dL (02-10 @ 08:28)  POCT Blood Glucose.: 74 mg/dL (02-10 @ 00:17)  POCT Blood Glucose.: 171 mg/dL (02-09 @ 20:00)      Skin per nursing documentation: free of pressure injuries  Edema per nursing documentation: 2+ bilateral arm    Estimated Needs:   [ x] no change since previous assessment  [ ] recalculated:     Previous Nutrition Diagnosis: Altered Nutrition Related Lab Values  Nutrition Diagnosis is: ongoing, addressed with therapeutic diet restrictions     New Nutrition Diagnosis: none     Interventions: Will continue to monitor for appropriate time to review nutrition education     Recommend  1) Continue current diet order of Consistent Carbohydrate (with evening snack)  2) RD to add Diet Mighty Shakes x2 daily to increase/optimize PO intake  3) Encourage PO intake and provide feeding assistance PRN     Monitoring and Evaluation:   - Continue to monitor nutritional intake, tolerance to diet prescription, weights, labs, skin integrity  - RD remains available upon request and will follow up per protocol    Lana Engle RD CDN    Pager# [phone]

## 2020-02-10 NOTE — CHART NOTE - NSCHARTNOTEFT_GEN_A_CORE
Notified patient refusing depakote 500 qhs. Went up to speak to patient who was sleeping comfortably in bed about the need for the medication. According to the psychiatry recommendations:   as per previous recommendation for mood instability, consider depakote 250 mg Qdaily and 500 mg qh  Patient saying "shes fine" and doesn't need it. Patient in stable condition otherwise and quickly went back to sleep. Will attempt to give again at a later time.     Bashir Loving  PGY-1 Internal Medicine  [phone]

## 2020-02-10 NOTE — PROGRESS NOTE ADULT - PROBLEM SELECTOR PLAN 6
- Moderate pulmonary HTN per TTE (1/13)  - Per son, sildenafil was discontinued   - Continue with metoprolol 50mg - Moderate pulmonary HTN per TTE (1/13)  - Per son, sildenafil was discontinued   - Resp status stable.

## 2020-02-10 NOTE — PROGRESS NOTE ADULT - SUBJECTIVE AND OBJECTIVE BOX
DIABETES FOLLOW UP NOTE: Saw pt earlier today  INTERVAL HX: 65 y/o F w/h/o uncontrolled T2DM with unknown complications. Also h/o functional paraplegia, hypothyroidism, pulmonary hypertension, COPD, RA on chronic prednisone, SBO s/p ex lap with lysis of adhesions c/b multifocal pneumonia. Here with AMS/agitation. Pt still with AMS of unclear etiology and lack of capacity per mental health team. Tolerating POs and taking insulin, Synthroid and Prednisone today but refusing other meds on and off. Glycemic control improving with BG 100s to 200s. Positive hypoglycemia  this pm with BGs in 60s. Per PCA pt didn't have much for breakfast this am. Pt on chronic Prednisone 15mg daily for RA.  Pt unable to answer questions. Keeps asking for son Norris and telling this provider people are coming to hurt her. Unable to concentrate or follow commands. Also Synthroid given at 8am with other meds instead of 6am on an empty stomach. Per staff,  pt is very combative and refuses medications or spits out medications so unable to determine how much synthroid pt is taking and/or absorbing. Also staff reporting that pt is eating less now so team decreased meal time insulin by 50%. Pt doeen't have IV access > pulling out IVs and not allowing IV to be restarted.       Review of Systems:  General. As above. Unable to assess further more.     Allergies    No Known Allergies    Intolerances    Seroquel (Other)    MEDICATIONS:  insulin glargine Injectable (LANTUS) 34 Unit(s) SubCutaneous every morning  insulin lispro (HumaLOG) corrective regimen sliding scale   SubCutaneous three times a day before meals  insulin lispro (HumaLOG) corrective regimen sliding scale   SubCutaneous at bedtime  insulin lispro Injectable (HumaLOG) 9 Unit(s) SubCutaneous three times a day before meals  levothyroxine 175 MICROGram(s) Oral daily  predniSONE   Tablet 15 milliGRAM(s) Oral daily  simvastatin 20 milliGRAM(s) Oral at bedtime      PHYSICAL EXAM:  VITALS: T(C): 36.8 (02-10-20 @ 09:00)  T(F): 98.3 (02-10-20 @ 09:00), Max: 98.3 (02-10-20 @ 09:00)  HR: 86 (02-10-20 @ 09:00) (71 - 104)  BP: 120/89 (02-10-20 @ 09:00) (118/79 - 120/89)  RR:  (18 - 18)  SpO2:  (94% - 98%)  Wt(kg): --  GENERAL: Female laying in bed in NAD  Abdomen: Soft, nontender, non distended, obese  Extremities: Warm, trace edema around ankles  NEURO: Alert but unable to answer questions or follow commands at this time.     LABS:  POCT Blood Glucose.: 89 mg/dL (02-10-20 @ 14:55)  POCT Blood Glucose.: 69 mg/dL (02-10-20 @ 14:20)  POCT Blood Glucose.: 68 mg/dL (02-10-20 @ 14:18)  POCT Blood Glucose.: 176 mg/dL (02-10-20 @ 08:28)  POCT Blood Glucose.: 74 mg/dL (02-10-20 @ 00:17)  POCT Blood Glucose.: 171 mg/dL (02-09-20 @ 20:00)  POCT Blood Glucose.: 243 mg/dL (02-09-20 @ 15:24)  POCT Blood Glucose.: 165 mg/dL (02-09-20 @ 08:18)  POCT Blood Glucose.: 195 mg/dL (02-08-20 @ 18:51)  POCT Blood Glucose.: 192 mg/dL (02-08-20 @ 15:35)  POCT Blood Glucose.: 210 mg/dL (02-08-20 @ 08:11)  POCT Blood Glucose.: 172 mg/dL (02-07-20 @ 21:57)  POCT Blood Glucose.: 243 mg/dL (02-07-20 @ 17:46)      Thyroid Function Tests:  02-01 @ 22:37 TSH 8.35 FreeT4 1.3 T3 -- Anti TPO -- Anti Thyroglobulin Ab -- TSI --  01-28 @ 17:35 TSH -- FreeT4 0.7 T3 -- Anti TPO -- Anti Thyroglobulin Ab -- TSI --      Hemoglobin A1C, Whole Blood: 9.0 % <H> [4.0 - 5.6] (01-28-20 @ 19:30)  Hemoglobin A1C, Whole Blood: 9.2 % <H> [4.0 - 5.6] (01-27-20 @ 17:36)      01-27 Chol 307<H> <H> HDL 50 Trig 352<H>

## 2020-02-10 NOTE — PROGRESS NOTE ADULT - PROBLEM SELECTOR PLAN 1
-Test BG ac and hs  -C/w Lantus 34 units q am  -C/w Humalog 9 units ac meals for now. Will adjust as needed. Make sure pt eats. Hold if pt not eating  -c/w Humalog moderate correction scale since pt is on Prednisone.  --Pt unable to care for self at this time. Pt will need to continue basal/bolus therapy upon discharge.  -Plan discussed with pt's team.  Contact info: [phone] (24/7). pager [phone].

## 2020-02-11 LAB
ALBUMIN SERPL ELPH-MCNC: 3.8 G/DL — SIGNIFICANT CHANGE UP (ref 3.3–5)
ALP SERPL-CCNC: 87 U/L — SIGNIFICANT CHANGE UP (ref 40–120)
ALT FLD-CCNC: 18 U/L — SIGNIFICANT CHANGE UP (ref 10–45)
ANION GAP SERPL CALC-SCNC: 17 MMOL/L — SIGNIFICANT CHANGE UP (ref 5–17)
AST SERPL-CCNC: 34 U/L — SIGNIFICANT CHANGE UP (ref 10–40)
BILIRUB SERPL-MCNC: 0.1 MG/DL — LOW (ref 0.2–1.2)
BUN SERPL-MCNC: 20 MG/DL — SIGNIFICANT CHANGE UP (ref 7–23)
CALCIUM SERPL-MCNC: 9.6 MG/DL — SIGNIFICANT CHANGE UP (ref 8.4–10.5)
CHLORIDE SERPL-SCNC: 105 MMOL/L — SIGNIFICANT CHANGE UP (ref 96–108)
CO2 SERPL-SCNC: 20 MMOL/L — LOW (ref 22–31)
CREAT SERPL-MCNC: 0.85 MG/DL — SIGNIFICANT CHANGE UP (ref 0.5–1.3)
GAS PNL BLDA: SIGNIFICANT CHANGE UP
GLUCOSE BLDC GLUCOMTR-MCNC: 142 MG/DL — HIGH (ref 70–99)
GLUCOSE BLDC GLUCOMTR-MCNC: 146 MG/DL — HIGH (ref 70–99)
GLUCOSE BLDC GLUCOMTR-MCNC: 149 MG/DL — HIGH (ref 70–99)
GLUCOSE BLDC GLUCOMTR-MCNC: 170 MG/DL — HIGH (ref 70–99)
GLUCOSE BLDC GLUCOMTR-MCNC: 181 MG/DL — HIGH (ref 70–99)
GLUCOSE SERPL-MCNC: 149 MG/DL — HIGH (ref 70–99)
MAGNESIUM SERPL-MCNC: 2.2 MG/DL — SIGNIFICANT CHANGE UP (ref 1.6–2.6)
PHOSPHATE SERPL-MCNC: 3.3 MG/DL — SIGNIFICANT CHANGE UP (ref 2.5–4.5)
POTASSIUM SERPL-MCNC: 5.4 MMOL/L — HIGH (ref 3.5–5.3)
POTASSIUM SERPL-SCNC: 5.4 MMOL/L — HIGH (ref 3.5–5.3)
PROT SERPL-MCNC: 7.4 G/DL — SIGNIFICANT CHANGE UP (ref 6–8.3)
SODIUM SERPL-SCNC: 142 MMOL/L — SIGNIFICANT CHANGE UP (ref 135–145)

## 2020-02-11 PROCEDURE — 99232 SBSQ HOSP IP/OBS MODERATE 35: CPT

## 2020-02-11 PROCEDURE — 70450 CT HEAD/BRAIN W/O DYE: CPT | Mod: 26

## 2020-02-11 PROCEDURE — 99232 SBSQ HOSP IP/OBS MODERATE 35: CPT | Mod: GC

## 2020-02-11 RX ORDER — DIVALPROEX SODIUM 500 MG/1
500 TABLET, DELAYED RELEASE ORAL
Refills: 0 | Status: DISCONTINUED | OUTPATIENT
Start: 2020-02-11 | End: 2020-02-12

## 2020-02-11 RX ORDER — DIVALPROEX SODIUM 500 MG/1
250 TABLET, DELAYED RELEASE ORAL
Refills: 0 | Status: DISCONTINUED | OUTPATIENT
Start: 2020-02-11 | End: 2020-02-12

## 2020-02-11 RX ORDER — DIVALPROEX SODIUM 500 MG/1
500 TABLET, DELAYED RELEASE ORAL
Refills: 0 | Status: DISCONTINUED | OUTPATIENT
Start: 2020-02-11 | End: 2020-02-11

## 2020-02-11 RX ADMIN — BUDESONIDE AND FORMOTEROL FUMARATE DIHYDRATE 2 PUFF(S): 160; 4.5 AEROSOL RESPIRATORY (INHALATION) at 05:52

## 2020-02-11 RX ADMIN — Medication 2: at 18:36

## 2020-02-11 RX ADMIN — Medication 9 UNIT(S): at 13:28

## 2020-02-11 RX ADMIN — GABAPENTIN 400 MILLIGRAM(S): 400 CAPSULE ORAL at 16:25

## 2020-02-11 RX ADMIN — DIVALPROEX SODIUM 250 MILLIGRAM(S): 500 TABLET, DELAYED RELEASE ORAL at 11:13

## 2020-02-11 RX ADMIN — GABAPENTIN 400 MILLIGRAM(S): 400 CAPSULE ORAL at 11:14

## 2020-02-11 RX ADMIN — INSULIN GLARGINE 34 UNIT(S): 100 INJECTION, SOLUTION SUBCUTANEOUS at 09:50

## 2020-02-11 RX ADMIN — BUDESONIDE AND FORMOTEROL FUMARATE DIHYDRATE 2 PUFF(S): 160; 4.5 AEROSOL RESPIRATORY (INHALATION) at 17:13

## 2020-02-11 RX ADMIN — PANTOPRAZOLE SODIUM 40 MILLIGRAM(S): 20 TABLET, DELAYED RELEASE ORAL at 11:14

## 2020-02-11 RX ADMIN — Medication 15 MILLIGRAM(S): at 11:13

## 2020-02-11 RX ADMIN — Medication 50 MILLIGRAM(S): at 11:13

## 2020-02-11 RX ADMIN — Medication 175 MICROGRAM(S): at 11:13

## 2020-02-11 RX ADMIN — Medication 9 UNIT(S): at 18:39

## 2020-02-11 RX ADMIN — Medication 2: at 13:28

## 2020-02-11 RX ADMIN — Medication 9 UNIT(S): at 09:51

## 2020-02-11 RX ADMIN — DIVALPROEX SODIUM 500 MILLIGRAM(S): 500 TABLET, DELAYED RELEASE ORAL at 16:25

## 2020-02-11 NOTE — PROGRESS NOTE ADULT - PROBLEM SELECTOR PLAN 4
Patient w/ anterior abdominal wound from SBO revision   - wound care following; change dressing every 7 days; last 2/6  - patient refusing dressing changes per nursing and exam by physician  - risk of infection discussed with ana maría NEGRETE for s&s of infection w/ son at bedside

## 2020-02-11 NOTE — PROGRESS NOTE ADULT - PROBLEM SELECTOR PLAN 5
- Bcx + for GPR on 1/12, 1 out of 2 bottles   -+Bcx from 1/12 sent to [facility] laboratories, currently no results  - Blood cx obtained on 1/25 no growth X 5 days.  - Patient noted to have leukocytosis, but remains afebrile   - will monitor off abx for now as repeat cultures ngtd

## 2020-02-11 NOTE — PROGRESS NOTE ADULT - PROBLEM SELECTOR PLAN 2
-C/w levothyroxine 175 MICROGram(s) Oral daily  -Recheck in 4-6 weeks.  -Staff to try as much as possible to give on an empty stomach  -Plan discussed with pt/team. If pt allows IV access can consider switching dose to 140mcg IV.   Contact info: [phone] (24/7). pager [phone]

## 2020-02-11 NOTE — PROGRESS NOTE ADULT - PROBLEM SELECTOR PLAN 7
-  with slight AG (18), BHB 4 (elevated) on presentation  - hospital course complicated by FBGs in 300s; now better controlled   - Per endocrine, c/w lantus 34 + Humalog 18 TID with meals, mod ISS since on steroids --> given hypoglycemia, reduced humalog to 9 tid for now. -  with slight AG (18), BHB 4 (elevated) on presentation; hospital course complicated by FBGs in 300s; then hypoglycemia likely due to poor   - Per endocrine, c/w lantus 34 + Humalog 9 TID with meals, mod ISS since on steroids

## 2020-02-11 NOTE — PHYSICAL THERAPY INITIAL EVALUATION ADULT - ADDITIONAL COMMENTS
Pt lives in a private home with son and receives assistance from [facility] 2 x 12hr since August. A reported that pt had a complication with a bowel reconstruction 1.5 years ago which led to pt not believing she can stand. Pt has not stood or ambulated since. Pt has hospital bed at home and uses aspen lift to get in and out of bed. Pt has w/c at home as well. Pt can communicate need for bed pan. Pt lives in a private home with son and receives assistance from [facility] 2 x 12hr since August. A reported that pt had a complication with a bowel reconstruction 1.5 years ago which led to pt not believing she can stand. Pt has not stood or ambulated since. Pt has hospital bed at home and uses aspen lift to get in and out of bed. Pt has w/c at home as well. Pt can communicate need for bed layton. lives in apt, / walk in , glasses for reading, hearing good, R handed

## 2020-02-11 NOTE — PROGRESS NOTE ADULT - ATTENDING COMMENTS
Patient seen and examined today. I agree with the above findings, assessment, and plan with the following additions and exceptions:     Acute encephalopathy, unspecified.   No clear single etiology at this point in time. Likely multifactorial. Patient with severe hypothyroidism and SDH on imaging. Noted to have signs of AI at outside hospital. Current back on home dose of prednisone. Undiagnosed infection is a possibility. Per wound care notes, patients wound does not look infected. She has refused further evaluation of her wound. UA unable to be obtained given patients lack of cooperation. Continue to monitor off abx for now. Repeat Bcx with ngtd. CSF VDRL and CSF autoimmune encephalitis panel are pending.   Neuro follow up pending.   Endo recs appreciated for hypothyroidism and dm.   Patient agitated at times with refusal of physical exam at times, refusal of blood draws, and refusal of certain meds. However patient has taken some doses of VPA as well as Synthroid although the latter not on an empty stomach. If patient can consistently take medications and has an improved response, we may be able to disposition to a rehab facility which would be a safer option for discharge for agitation and medication management.   We will discuss with the family the idea of rehab instead of home with services.   Rest of plan as above.    Dr. Avery Dugan DO  Attending Physician  Division of Hospital Medicine  [facility]  Pager:  [phone] Patient seen and examined today. I agree with the above findings, assessment, and plan with the following additions and exceptions:     Acute encephalopathy, unspecified.   No clear single etiology at this point in time. Likely multifactorial. Patient with severe hypothyroidism and SDH on imaging. Noted to have signs of AI at outside hospital. Current back on home dose of prednisone. Undiagnosed infection is a possibility. Per wound care notes, patients wound does not look infected. She has refused further evaluation of her wound. UA unable to be obtained given patients lack of cooperation. Continue to monitor off abx for now. Repeat Bcx with ngtd. CSF VDRL and CSF autoimmune encephalitis panel are pending.   Neuro follow up pending.   Endo recs appreciated for hypothyroidism and dm.   Patient agitated at times with refusal of physical exam at times, refusal of blood draws, and refusal of certain meds. Patient has taken some doses of VPA as well as Synthroid although the latter not on an empty stomach. If patient can consistently take medications and has an improved response, we may be able to disposition to a rehab facility which would be a safer option for discharge given our issues with agitation and medication management.  We will discuss with the family the idea of rehab instead of home with services.   Rest of plan as above.    Dr. Avery Dugan, DO  Attending Physician  Division of Hospital Medicine  [facility]  Pager:  [phone]

## 2020-02-11 NOTE — PHYSICAL THERAPY INITIAL EVALUATION ADULT - PERTINENT HX OF CURRENT PROBLEM, REHAB EVAL
Pt is a 63 y/o F admitted for change in mental status. Head and Abdomen CT (-). MRI showed R holohemispheric subdural hematoma subacute. R vertebral origin stenosis PMH: functional paraplegia bedbound, hypothyroidism, pulmonary HTN, DM on insulin, COPD/CHRIS on nocturnal CPAP +3L at home, RA on chronic prednisone, chronic tremors. 65 y/o F  admitted to [facility] 1/25/20 for change in mental status. Head and Abdomen CT (-). MRI showed R holohemispheric subdural hematoma subacute. R vertebral origin stenosis PMH: functional paraplegia bedbound, hypothyroidism, pulmonary HTN, DM on insulin, COPD/CHRIS on nocturnal CPAP +3L at home, RA on chronic prednisone, chronic tremors.

## 2020-02-11 NOTE — PROGRESS NOTE BEHAVIORAL HEALTH - NSBHCONSULTRECOMMENDOTHER_PSY_A_CORE FT
1)  depakote 250 mg Qdaily and 500 mg qhs; would obtain VPA level and LFTs after 4 days  2) for acute severe  agitation, continue haldol 2 mg IM Q4hr PRN, f/u QTc < 500  3) pt's confusion likely metabolic in nature  4) pt lacks capacity to refuse treatments, defer to pt's HCP

## 2020-02-11 NOTE — PROGRESS NOTE ADULT - SUBJECTIVE AND OBJECTIVE BOX
DIABETES FOLLOW UP NOTE: Saw pt earlier today  INTERVAL HX: 63 y/o F w/h/o uncontrolled T2DM with unknown complications. Also h/o functional paraplegia, hypothyroidism, pulmonary hypertension, COPD, RA on chronic prednisone, SBO s/p ex lap with lysis of adhesions c/b multifocal pneumonia. Here with AMS/agitation. Pt still with AMS of unclear etiology and lack of capacity per mental health team. Tolerating POs and taking insulin late today since pt was refusing all meds and food this am. Also refused Synthroid and Prednisone in am but took oral meds mid morning. No further hypoglycemia since yesterday. Per staff very difficult to manage pt's meds and food since she is refusing care on and off. Remains very suspicious of staff and providers.  BG levels better today while on present insulin doses.      Review of Systems:  Cardiovascular: No chest pain, palpitations  Respiratory: No SOB, no cough  GI: No nausea, vomiting, abdominal pain  Endocrine: no polyuria, polydipsia or S&Sx of hypoglycemia    Allergies    No Known Allergies    Intolerances    Seroquel (Other)    MEDICATIONS:  insulin glargine Injectable (LANTUS) 34 Unit(s) SubCutaneous every morning  insulin lispro (HumaLOG) corrective regimen sliding scale   SubCutaneous three times a day before meals  insulin lispro (HumaLOG) corrective regimen sliding scale   SubCutaneous at bedtime  insulin lispro Injectable (HumaLOG) 9 Unit(s) SubCutaneous three times a day before meals  levothyroxine 175 MICROGram(s) Oral daily  predniSONE   Tablet 15 milliGRAM(s) Oral daily  simvastatin 20 milliGRAM(s) Oral at bedtime      PHYSICAL EXAM:  VITALS: T(C): 37.6 (02-11-20 @ 13:36)  T(F): 99.7 (02-11-20 @ 13:36), Max: 99.7 (02-11-20 @ 13:36)  HR: 100 (02-11-20 @ 13:36) (76 - 100)  BP: 112/77 (02-11-20 @ 13:36) (112/77 - 116/84)  RR:  (18 - 18)  SpO2:  (95% - 98%)  Wt(kg): --  GENERAL: Female laying in bed in NAD  Abdomen: Soft, nontender, non distended, obese  Extremities: Warm, no edema in all 4 exts  NEURO: Alert with disorganized thought process.     LABS:  POCT Blood Glucose.: 181 mg/dL (02-11-20 @ 13:18)  POCT Blood Glucose.: 142 mg/dL (02-11-20 @ 09:47)  POCT Blood Glucose.: 149 mg/dL (02-11-20 @ 01:48)  POCT Blood Glucose.: 216 mg/dL (02-10-20 @ 21:51)  POCT Blood Glucose.: 89 mg/dL (02-10-20 @ 14:55)  POCT Blood Glucose.: 69 mg/dL (02-10-20 @ 14:20)  POCT Blood Glucose.: 68 mg/dL (02-10-20 @ 14:18)  POCT Blood Glucose.: 176 mg/dL (02-10-20 @ 08:28)  POCT Blood Glucose.: 74 mg/dL (02-10-20 @ 00:17)  POCT Blood Glucose.: 171 mg/dL (02-09-20 @ 20:00)  POCT Blood Glucose.: 243 mg/dL (02-09-20 @ 15:24)  POCT Blood Glucose.: 165 mg/dL (02-09-20 @ 08:18)  POCT Blood Glucose.: 195 mg/dL (02-08-20 @ 18:51)        Thyroid Function Tests:  02-01 @ 22:37 TSH 8.35 FreeT4 1.3 T3 -- Anti TPO -- Anti Thyroglobulin Ab -- TSI --  01-28 @ 17:35 TSH -- FreeT4 0.7 T3 -- Anti TPO -- Anti Thyroglobulin Ab -- TSI --      Hemoglobin A1C, Whole Blood: 9.0 % <H> [4.0 - 5.6] (01-28-20 @ 19:30)  Hemoglobin A1C, Whole Blood: 9.2 % <H> [4.0 - 5.6] (01-27-20 @ 17:36)      01-27 Chol 307<H> <H> HDL 50 Trig 352<H>

## 2020-02-11 NOTE — PROGRESS NOTE ADULT - SUBJECTIVE AND OBJECTIVE BOX
***************************************************************  Dr. Kellie Bernal  Internal Medicine   [facility] Pager: [phone]  [facility] Pager: [phone]   ***************************************************************    AFTAB BASS  64y  MRN: [id number]    Subjective:    Patient is a 64y old  Female who presents with a chief complaint of change in mental status (10 Feb 2020 17:03)      Interval history/overnight events:      MEDICATIONS  (STANDING):  budesonide  80 MICROgram(s)/formoterol 4.5 MICROgram(s) Inhaler 2 Puff(s) Inhalation two times a day  dextrose 5%. 1000 milliLiter(s) (50 mL/Hr) IV Continuous <Continuous>  dextrose 50% Injectable 12.5 Gram(s) IV Push once  dextrose 50% Injectable 25 Gram(s) IV Push once  dextrose 50% Injectable 25 Gram(s) IV Push once  diVALproex Sprinkle 250 milliGRAM(s) Oral daily  diVALproex Sprinkle 500 milliGRAM(s) Oral daily  gabapentin 400 milliGRAM(s) Oral three times a day  influenza   Vaccine 0.5 milliLiter(s) IntraMuscular once  insulin glargine Injectable (LANTUS) 34 Unit(s) SubCutaneous every morning  insulin lispro (HumaLOG) corrective regimen sliding scale   SubCutaneous three times a day before meals  insulin lispro (HumaLOG) corrective regimen sliding scale   SubCutaneous at bedtime  insulin lispro Injectable (HumaLOG) 9 Unit(s) SubCutaneous three times a day before meals  levothyroxine 175 MICROGram(s) Oral daily  metoprolol succinate ER 50 milliGRAM(s) Oral daily  pantoprazole    Tablet 40 milliGRAM(s) Oral before breakfast  predniSONE   Tablet 15 milliGRAM(s) Oral daily  primidone 50 milliGRAM(s) Oral at bedtime  simvastatin 20 milliGRAM(s) Oral at bedtime    MEDICATIONS  (PRN):  acetaminophen   Tablet .. 650 milliGRAM(s) Oral every 6 hours PRN Temp greater or equal to 38C (100.4F), Mild Pain (1 - 3), Moderate Pain (4 - 6)  albuterol/ipratropium for Nebulization 3 milliLiter(s) Nebulizer every 6 hours PRN Shortness of Breath and/or Wheezing  dextrose 40% Gel 15 Gram(s) Oral once PRN Blood Glucose LESS THAN 70 milliGRAM(s)/deciLiter  glucagon  Injectable 1 milliGRAM(s) IntraMuscular once PRN Glucose <70 milliGRAM(s)/deciLiter  haloperidol    Injectable 2 milliGRAM(s) IntraMuscular every 4 hours PRN Agitation  haloperidol    Injectable 2 milliGRAM(s) IV Push every 4 hours PRN Agitation  melatonin 3 milliGRAM(s) Oral at bedtime PRN Insomnia  naproxen 250 milliGRAM(s) Oral every 6 hours PRN Moderate Pain (4 - 6)  traMADol 100 milliGRAM(s) Oral three times a day PRN Severe Pain (7 - 10)        Objective:    Vitals: Vital Signs Last 24 Hrs  T(C): 36.8 (02-10-20 @ 09:00), Max: 36.8 (02-10-20 @ 09:00)  T(F): 98.3 (02-10-20 @ 09:00), Max: 98.3 (02-10-20 @ 09:00)  HR: 77 (02-11-20 @ 05:53) (76 - 86)  BP: 116/84 (02-10-20 @ 19:14) (116/84 - 120/89)  BP(mean): --  RR: 18 (02-10-20 @ 19:14) (18 - 18)  SpO2: 98% (02-11-20 @ 05:53) (94% - 98%)            I&O's Summary    09 Feb 2020 07:01  -  10 Feb 2020 07:00  --------------------------------------------------------  IN: 960 mL / OUT: 0 mL / NET: 960 mL    10 Feb 2020 07:01  -  11 Feb 2020 06:41  --------------------------------------------------------  IN: 680 mL / OUT: 0 mL / NET: 680 mL        PHYSICAL EXAM:  GENERAL: NAD  HEENT: PERRL, no scleral icterus, no head and neck lad   CHEST/LUNG: CTAB, no wheezing, crackles, or ronchi   HEART: RRR, normal S1, S2, no murmurs, gallops, or rubs appreciated   ABDOMEN: soft, nondistended, non-tender, normoactive, no HSM, no rebound, no guarding, no rigidity  SKIN: No rashes or lesions  NERVOUS SYSTEM: Alert & Oriented X3  PSYCH: calm and cooperative     LABS:                              CAPILLARY BLOOD GLUCOSE      POCT Blood Glucose.: 149 mg/dL (11 Feb 2020 01:48)  POCT Blood Glucose.: 216 mg/dL (10 Feb 2020 21:51)  POCT Blood Glucose.: 89 mg/dL (10 Feb 2020 14:55)  POCT Blood Glucose.: 69 mg/dL (10 Feb 2020 14:20)  POCT Blood Glucose.: 68 mg/dL (10 Feb 2020 14:18)  POCT Blood Glucose.: 176 mg/dL (10 Feb 2020 08:28)          RADIOLOGY & ADDITIONAL TESTS:            Imaging Personally Reviewed:  [ ] YES  [ ] NO    Consultants involved in case:   Consultant(s) Notes Reviewed:  [ ] YES  [ ] NO:   Care Discussed with Consultants/Other Providers [ ] YES  [ ] NO ***************************************************************  Dr. Kellie Bernal  Internal Medicine   [facility] Pager: [phone]  [facility] Pager: [phone]   ***************************************************************    AFTAB BASS  64y  MRN: [id number]    Subjective:    Patient is a 64y old  Female who presents with a chief complaint of change in mental status (10 Feb 2020 17:03)      Interval history/overnight events:    Refused depakote last night and AM meds today. Somnolent but rouseable when examined this AM. ROS not assessed.       MEDICATIONS  (STANDING):  budesonide  80 MICROgram(s)/formoterol 4.5 MICROgram(s) Inhaler 2 Puff(s) Inhalation two times a day  dextrose 5%. 1000 milliLiter(s) (50 mL/Hr) IV Continuous <Continuous>  dextrose 50% Injectable 12.5 Gram(s) IV Push once  dextrose 50% Injectable 25 Gram(s) IV Push once  dextrose 50% Injectable 25 Gram(s) IV Push once  diVALproex Sprinkle 250 milliGRAM(s) Oral daily  diVALproex Sprinkle 500 milliGRAM(s) Oral daily  gabapentin 400 milliGRAM(s) Oral three times a day  influenza   Vaccine 0.5 milliLiter(s) IntraMuscular once  insulin glargine Injectable (LANTUS) 34 Unit(s) SubCutaneous every morning  insulin lispro (HumaLOG) corrective regimen sliding scale   SubCutaneous three times a day before meals  insulin lispro (HumaLOG) corrective regimen sliding scale   SubCutaneous at bedtime  insulin lispro Injectable (HumaLOG) 9 Unit(s) SubCutaneous three times a day before meals  levothyroxine 175 MICROGram(s) Oral daily  metoprolol succinate ER 50 milliGRAM(s) Oral daily  pantoprazole    Tablet 40 milliGRAM(s) Oral before breakfast  predniSONE   Tablet 15 milliGRAM(s) Oral daily  primidone 50 milliGRAM(s) Oral at bedtime  simvastatin 20 milliGRAM(s) Oral at bedtime    MEDICATIONS  (PRN):  acetaminophen   Tablet .. 650 milliGRAM(s) Oral every 6 hours PRN Temp greater or equal to 38C (100.4F), Mild Pain (1 - 3), Moderate Pain (4 - 6)  albuterol/ipratropium for Nebulization 3 milliLiter(s) Nebulizer every 6 hours PRN Shortness of Breath and/or Wheezing  dextrose 40% Gel 15 Gram(s) Oral once PRN Blood Glucose LESS THAN 70 milliGRAM(s)/deciLiter  glucagon  Injectable 1 milliGRAM(s) IntraMuscular once PRN Glucose <70 milliGRAM(s)/deciLiter  haloperidol    Injectable 2 milliGRAM(s) IntraMuscular every 4 hours PRN Agitation  haloperidol    Injectable 2 milliGRAM(s) IV Push every 4 hours PRN Agitation  melatonin 3 milliGRAM(s) Oral at bedtime PRN Insomnia  naproxen 250 milliGRAM(s) Oral every 6 hours PRN Moderate Pain (4 - 6)  traMADol 100 milliGRAM(s) Oral three times a day PRN Severe Pain (7 - 10)        Objective:    Vitals: Vital Signs Last 24 Hrs  T(C): 36.8 (02-10-20 @ 09:00), Max: 36.8 (02-10-20 @ 09:00)  T(F): 98.3 (02-10-20 @ 09:00), Max: 98.3 (02-10-20 @ 09:00)  HR: 77 (02-11-20 @ 05:53) (76 - 86)  BP: 116/84 (02-10-20 @ 19:14) (116/84 - 120/89)  BP(mean): --  RR: 18 (02-10-20 @ 19:14) (18 - 18)  SpO2: 98% (02-11-20 @ 05:53) (94% - 98%)            I&O's Summary    09 Feb 2020 07:01  -  10 Feb 2020 07:00  --------------------------------------------------------  IN: 960 mL / OUT: 0 mL / NET: 960 mL    10 Feb 2020 07:01  -  11 Feb 2020 06:41  --------------------------------------------------------  IN: 680 mL / OUT: 0 mL / NET: 680 mL        PHYSICAL EXAM:  GENERAL: NAD  HEENT: PERRL, no scleral icterus, no head and neck lad   CHEST/LUNG: CTAB, no wheezing, crackles, or ronchi   HEART: RRR, normal S1, S2, no murmurs, gallops, or rubs appreciated   ABDOMEN: soft, nondistended, non-tender, normoactive, no HSM, no rebound, no guarding, no rigidity  SKIN: No rashes or lesions  NERVOUS SYSTEM: Alert & Oriented X3  PSYCH: calm and cooperative     LABS:                              CAPILLARY BLOOD GLUCOSE      POCT Blood Glucose.: 149 mg/dL (11 Feb 2020 01:48)  POCT Blood Glucose.: 216 mg/dL (10 Feb 2020 21:51)  POCT Blood Glucose.: 89 mg/dL (10 Feb 2020 14:55)  POCT Blood Glucose.: 69 mg/dL (10 Feb 2020 14:20)  POCT Blood Glucose.: 68 mg/dL (10 Feb 2020 14:18)  POCT Blood Glucose.: 176 mg/dL (10 Feb 2020 08:28)          RADIOLOGY & ADDITIONAL TESTS:            Imaging Personally Reviewed:  [ ] YES  [ ] NO    Consultants involved in case:   Consultant(s) Notes Reviewed:  [ ] YES  [ ] NO:   Care Discussed with Consultants/Other Providers [ ] YES  [ ] NO ***************************************************************  Dr. Kellie Bernal  Internal Medicine   [facility] Pager: [phone]  [facility] Pager: [phone]   ***************************************************************    AFTAB BASS  64y  MRN: [id number]    Subjective:    Patient is a 64y old  Female who presents with a chief complaint of change in mental status (10 Feb 2020 17:03)      Interval history/overnight events:    Refused depakote last night and AM meds today. Somnolent but rouseable when examined this AM. ROS not assessed as patient wanted to sleep.       MEDICATIONS  (STANDING):  budesonide  80 MICROgram(s)/formoterol 4.5 MICROgram(s) Inhaler 2 Puff(s) Inhalation two times a day  dextrose 5%. 1000 milliLiter(s) (50 mL/Hr) IV Continuous <Continuous>  dextrose 50% Injectable 12.5 Gram(s) IV Push once  dextrose 50% Injectable 25 Gram(s) IV Push once  dextrose 50% Injectable 25 Gram(s) IV Push once  diVALproex Sprinkle 250 milliGRAM(s) Oral daily  diVALproex Sprinkle 500 milliGRAM(s) Oral daily  gabapentin 400 milliGRAM(s) Oral three times a day  influenza   Vaccine 0.5 milliLiter(s) IntraMuscular once  insulin glargine Injectable (LANTUS) 34 Unit(s) SubCutaneous every morning  insulin lispro (HumaLOG) corrective regimen sliding scale   SubCutaneous three times a day before meals  insulin lispro (HumaLOG) corrective regimen sliding scale   SubCutaneous at bedtime  insulin lispro Injectable (HumaLOG) 9 Unit(s) SubCutaneous three times a day before meals  levothyroxine 175 MICROGram(s) Oral daily  metoprolol succinate ER 50 milliGRAM(s) Oral daily  pantoprazole    Tablet 40 milliGRAM(s) Oral before breakfast  predniSONE   Tablet 15 milliGRAM(s) Oral daily  primidone 50 milliGRAM(s) Oral at bedtime  simvastatin 20 milliGRAM(s) Oral at bedtime    MEDICATIONS  (PRN):  acetaminophen   Tablet .. 650 milliGRAM(s) Oral every 6 hours PRN Temp greater or equal to 38C (100.4F), Mild Pain (1 - 3), Moderate Pain (4 - 6)  albuterol/ipratropium for Nebulization 3 milliLiter(s) Nebulizer every 6 hours PRN Shortness of Breath and/or Wheezing  dextrose 40% Gel 15 Gram(s) Oral once PRN Blood Glucose LESS THAN 70 milliGRAM(s)/deciLiter  glucagon  Injectable 1 milliGRAM(s) IntraMuscular once PRN Glucose <70 milliGRAM(s)/deciLiter  haloperidol    Injectable 2 milliGRAM(s) IntraMuscular every 4 hours PRN Agitation  haloperidol    Injectable 2 milliGRAM(s) IV Push every 4 hours PRN Agitation  melatonin 3 milliGRAM(s) Oral at bedtime PRN Insomnia  naproxen 250 milliGRAM(s) Oral every 6 hours PRN Moderate Pain (4 - 6)  traMADol 100 milliGRAM(s) Oral three times a day PRN Severe Pain (7 - 10)        Objective:    Vitals: Vital Signs Last 24 Hrs  T(C): 36.8 (02-10-20 @ 09:00), Max: 36.8 (02-10-20 @ 09:00)  T(F): 98.3 (02-10-20 @ 09:00), Max: 98.3 (02-10-20 @ 09:00)  HR: 77 (02-11-20 @ 05:53) (76 - 86)  BP: 116/84 (02-10-20 @ 19:14) (116/84 - 120/89)  BP(mean): --  RR: 18 (02-10-20 @ 19:14) (18 - 18)  SpO2: 98% (02-11-20 @ 05:53) (94% - 98%)            I&O's Summary    09 Feb 2020 07:01  -  10 Feb 2020 07:00  --------------------------------------------------------  IN: 960 mL / OUT: 0 mL / NET: 960 mL    10 Feb 2020 07:01  -  11 Feb 2020 06:41  --------------------------------------------------------  IN: 680 mL / OUT: 0 mL / NET: 680 mL    PHYSICAL EXAM:  GENERAL: elderly female, agitated and delirious respiring on 3L   HEENT: PERRL, no scleral icterus, no head and neck lad   CHEST/LUNG: CTAB, no wheezing, rhonchi   HEART: RRR, No MRG  ABDOMEN: soft, distended, normoactive, nontender to palpation   SKIN: skin thickening over lower abdomen with chronic wound underneath pad; patient refusing further eval   NERVOUS SYSTEM: Alert & Oriented X 3   PSYCH: agitated, delirious (though more approachable than before)       LABS:  CAPILLARY BLOOD GLUCOSE      POCT Blood Glucose.: 149 mg/dL (11 Feb 2020 01:48)  POCT Blood Glucose.: 216 mg/dL (10 Feb 2020 21:51)  POCT Blood Glucose.: 89 mg/dL (10 Feb 2020 14:55)  POCT Blood Glucose.: 69 mg/dL (10 Feb 2020 14:20)  POCT Blood Glucose.: 68 mg/dL (10 Feb 2020 14:18)  POCT Blood Glucose.: 176 mg/dL (10 Feb 2020 08:28)          RADIOLOGY & ADDITIONAL TESTS:            Imaging Personally Reviewed:  [x ] YES  [ ] NO    Consultants involved in case:   Consultant(s) Notes Reviewed:  [x ] YES  [ ] NO:   Care Discussed with Consultants/Other Providers [x ] YES  [ ] NO ***************************************************************  Dr. Kellie Bernal  Internal Medicine   [facility] Pager: [phone]  [facility] Pager: [phone]   ***************************************************************    AFTAB BASS  64y  MRN: [id number]    Subjective:    Patient is a 64y old  Female who presents with a chief complaint of change in mental status (10 Feb 2020 17:03)      Interval history/overnight events:    Refused depakote last night and AM meds today. Somnolent but rouseable when examined this AM.     ROS not assessed as patient wanted to sleep.       MEDICATIONS  (STANDING):  budesonide  80 MICROgram(s)/formoterol 4.5 MICROgram(s) Inhaler 2 Puff(s) Inhalation two times a day  dextrose 5%. 1000 milliLiter(s) (50 mL/Hr) IV Continuous <Continuous>  dextrose 50% Injectable 12.5 Gram(s) IV Push once  dextrose 50% Injectable 25 Gram(s) IV Push once  dextrose 50% Injectable 25 Gram(s) IV Push once  diVALproex Sprinkle 250 milliGRAM(s) Oral daily  diVALproex Sprinkle 500 milliGRAM(s) Oral daily  gabapentin 400 milliGRAM(s) Oral three times a day  influenza   Vaccine 0.5 milliLiter(s) IntraMuscular once  insulin glargine Injectable (LANTUS) 34 Unit(s) SubCutaneous every morning  insulin lispro (HumaLOG) corrective regimen sliding scale   SubCutaneous three times a day before meals  insulin lispro (HumaLOG) corrective regimen sliding scale   SubCutaneous at bedtime  insulin lispro Injectable (HumaLOG) 9 Unit(s) SubCutaneous three times a day before meals  levothyroxine 175 MICROGram(s) Oral daily  metoprolol succinate ER 50 milliGRAM(s) Oral daily  pantoprazole    Tablet 40 milliGRAM(s) Oral before breakfast  predniSONE   Tablet 15 milliGRAM(s) Oral daily  primidone 50 milliGRAM(s) Oral at bedtime  simvastatin 20 milliGRAM(s) Oral at bedtime    MEDICATIONS  (PRN):  acetaminophen   Tablet .. 650 milliGRAM(s) Oral every 6 hours PRN Temp greater or equal to 38C (100.4F), Mild Pain (1 - 3), Moderate Pain (4 - 6)  albuterol/ipratropium for Nebulization 3 milliLiter(s) Nebulizer every 6 hours PRN Shortness of Breath and/or Wheezing  dextrose 40% Gel 15 Gram(s) Oral once PRN Blood Glucose LESS THAN 70 milliGRAM(s)/deciLiter  glucagon  Injectable 1 milliGRAM(s) IntraMuscular once PRN Glucose <70 milliGRAM(s)/deciLiter  haloperidol    Injectable 2 milliGRAM(s) IntraMuscular every 4 hours PRN Agitation  haloperidol    Injectable 2 milliGRAM(s) IV Push every 4 hours PRN Agitation  melatonin 3 milliGRAM(s) Oral at bedtime PRN Insomnia  naproxen 250 milliGRAM(s) Oral every 6 hours PRN Moderate Pain (4 - 6)  traMADol 100 milliGRAM(s) Oral three times a day PRN Severe Pain (7 - 10)        Objective:    Vitals: Vital Signs Last 24 Hrs  T(C): 36.8 (02-10-20 @ 09:00), Max: 36.8 (02-10-20 @ 09:00)  T(F): 98.3 (02-10-20 @ 09:00), Max: 98.3 (02-10-20 @ 09:00)  HR: 77 (02-11-20 @ 05:53) (76 - 86)  BP: 116/84 (02-10-20 @ 19:14) (116/84 - 120/89)  BP(mean): --  RR: 18 (02-10-20 @ 19:14) (18 - 18)  SpO2: 98% (02-11-20 @ 05:53) (94% - 98%)            I&O's Summary    09 Feb 2020 07:01  -  10 Feb 2020 07:00  --------------------------------------------------------  IN: 960 mL / OUT: 0 mL / NET: 960 mL    10 Feb 2020 07:01  -  11 Feb 2020 06:41  --------------------------------------------------------  IN: 680 mL / OUT: 0 mL / NET: 680 mL    PHYSICAL EXAM:  GENERAL: elderly female, agitated and delirious respiring on 3L   HEENT: PERRL, no scleral icterus, no head and neck lad   CHEST/LUNG: CTAB, no wheezing, rhonchi   HEART: RRR, No MRG  ABDOMEN: soft, distended, normoactive, nontender to palpation   SKIN: skin thickening over lower abdomen with chronic wound underneath pad; patient refusing further eval   NERVOUS SYSTEM: Alert & Oriented X 2  PSYCH: agitated, delirious (though more approachable than before)       LABS:  CAPILLARY BLOOD GLUCOSE      POCT Blood Glucose.: 149 mg/dL (11 Feb 2020 01:48)  POCT Blood Glucose.: 216 mg/dL (10 Feb 2020 21:51)  POCT Blood Glucose.: 89 mg/dL (10 Feb 2020 14:55)  POCT Blood Glucose.: 69 mg/dL (10 Feb 2020 14:20)  POCT Blood Glucose.: 68 mg/dL (10 Feb 2020 14:18)  POCT Blood Glucose.: 176 mg/dL (10 Feb 2020 08:28)          RADIOLOGY & ADDITIONAL TESTS:            Imaging Personally Reviewed:  [x ] YES  [ ] NO    Consultants involved in case:   Consultant(s) Notes Reviewed:  [x ] YES  [ ] NO:   Care Discussed with Consultants/Other Providers [x ] YES  [ ] NO

## 2020-02-11 NOTE — PROGRESS NOTE ADULT - PROBLEM SELECTOR PLAN 6
- Moderate pulmonary HTN per TTE (1/13)  - Per son, sildenafil was discontinued   - Resp status stable.

## 2020-02-11 NOTE — PROGRESS NOTE ADULT - PROBLEM SELECTOR PLAN 3
-  on admission; most recent TSH 8.35 2/1  - anti-TPO elevated c/w autoimmune thyroiditis   - c/w 175 mg as per Endocrine   - Endocrinology following; repeat TFTs 4-6 weeks -  on admission; most recent TSH 8.35 2/1  - anti-TPO elevated c/w autoimmune thyroiditis   - c/w 175 mg as per Endocrine   - Endocrinology following; repeat TFTs 4-6 weeks; will repeat earlier due to depakote

## 2020-02-11 NOTE — PROGRESS NOTE ADULT - PROBLEM SELECTOR PLAN 1
Unclear etiology - possibly from 3mm right sided SDH ided on MRI brain; autoimmune encephalitis 2/2 longstanding RA less likely as MRI brain neg for inflammatory changes, from hypothyroidism less likely given correction of TSH/FT4 with minimal change in behavior vs. possible praneoplastic syndrome, vs other psychiatric or infectious etiology  - s/p LP 2/6 w/ sedation; CSF studies w/ 1 nucleated cell; gram stain w/ few PMNs, no orgs; CSF cultures NGTD; CSF PCR, cryptococcal antigen neg, HSV PCR negative   - s/p MRI 2/6 brain revealing for subacute 3mm right holohemispheric subdural hematoma   - neurosurgery recs appreciated: no surgical intervention at this time, repeat CT head w/ change in exam   - rheumatology recs appreciated: less likely rheumatological origin as MRI brain neg for inflammatory changes; serum ALESSIA, ZAFAR, ribosomal P, dsdna neg, NMDRAb neg   - psych recs appreciated: start depakote 250 qday, 500 qhs, Haldol 2mg IV or IM Q4H for agitation, TFTs/LFTs in 3 days, holding olanzapine ; no psychiatric CI to discharge   - f/u CSF autoimmune and paraneoplastic panels, VDRL, Borrelia, NMDA R ab   - DVT ppx changed to SCDs given bleed Unclear etiology - possibly from 3mm right sided SDH ided on MRI brain; autoimmune encephalitis 2/2 longstanding RA less likely as MRI brain neg for inflammatory changes, from hypothyroidism less likely given correction of TSH/FT4 with minimal change in behavior vs. possible praneoplastic syndrome, vs other psychiatric or infectious etiology  - s/p LP 2/6 w/ sedation; CSF studies w/ 1 nucleated cell; gram stain w/ few PMNs, no orgs; CSF cultures NGTD; CSF PCR, cryptococcal antigen neg, HSV PCR negative   - s/p MRI 2/6 brain revealing for subacute 3mm right holohemispheric subdural hematoma   - neurosurgery recs appreciated: no surgical intervention at this time, repeat CT head w/ change in exam   - rheumatology recs appreciated: less likely rheumatological origin as MRI brain neg for inflammatory changes; serum ALESSIA, ZAFAR, ribosomal P, dsdna neg, NMDRAb neg   - psych recs appreciated: c/w depakote 250 qday, 500 qhs (will change to IV as patient refusing), Haldol 2mg IV or IM Q4H for agitation, TFTs/LFTs in 4 days (2/14), holding olanzapine ; no psychiatric CI to discharge   - f/u CSF autoimmune and paraneoplastic panels, VDRL, Borrelia, NMDA R ab   - DVT ppx changed to SCDs given bleed Unclear etiology - possibly from 3mm right sided SDH ided on MRI brain; autoimmune encephalitis 2/2 longstanding RA less likely as MRI brain neg for inflammatory changes, from hypothyroidism less likely given correction of TSH/FT4 with minimal change in behavior vs. possible praneoplastic syndrome, vs other psychiatric or infectious etiology  - s/p LP 2/6 w/ sedation; CSF studies w/ 1 nucleated cell; gram stain w/ few PMNs, no orgs; CSF cultures NGTD; CSF PCR, cryptococcal antigen neg, HSV PCR negative   - s/p MRI 2/6 brain revealing for subacute 3mm right holohemispheric subdural hematoma   - neurosurgery recs appreciated: no surgical intervention at this time, repeat CT head w/ change in exam   - rheumatology recs appreciated: less likely rheumatological origin as MRI brain neg for inflammatory changes; serum ALESSIA, ZAFAR, ribosomal P, dsdna neg, NMDRAb neg   - psych recs appreciated: c/w depakote 250 qday, 500 qhs (will change to sprinles as patient refusing PO), Haldol 2mg IV or IM Q4H for agitation, TFTs/LFTs in 4 days (2/14), holding olanzapine ; no psychiatric CI to discharge   - f/u CSF autoimmune and paraneoplastic panels, VDRL, Borrelia, NMDA R ab   - DVT ppx changed to SCDs given bleed

## 2020-02-11 NOTE — PROGRESS NOTE ADULT - ASSESSMENT
63 y/o F w/h/o uncontrolled T2DM with unknown complications. Also h/o functional paraplegia, hypothyroidism, pulmonary hypertension, COPD, RA on chronic prednisone, SBO s/p ex lap with lysis of adhesions c/b multifocal pneumonia. Here with AMS/agitation. Pt still with AMS of unclear etiology and lack of capacity per mental health team. Tolerating POs and taking insulin late today since pt was refusing all meds and food this am. Also refused Synthroid and Prednisone in am but took oral meds mid morning. No further hypoglycemia since yesterday. Per staff very difficult to manage pt's meds and food since she is refusing care on and off. Remains very suspicious of staff and providers. BG levels better today while on present insulin doses.    Also unable to give Synthroid on an empty stomach all the time because pt takes it with other meds and some times spits out some of her meds. Could consider IV synthroid but not doable at this time since pt doesn't have IV access. Pt refusing blood work since 2/5/20.

## 2020-02-11 NOTE — PHYSICAL THERAPY INITIAL EVALUATION ADULT - ACTIVE RANGE OF MOTION EXAMINATION, REHAB EVAL
bilateral upper extremity Active ROM was WFL (within functional limits)/bilateral  lower extremity Active ROM was WFL (within functional limits)/AAROM

## 2020-02-11 NOTE — PROGRESS NOTE ADULT - PROBLEM SELECTOR PLAN 1
-Test BG ac and hs  -C/w Lantus 34 units q am  -C/w Humalog 9 units ac meals for now. Will adjust as needed. Make sure pt eats. Hold if pt not eating  -c/w Humalog moderate correction scale since pt is on Prednisone.  --Pt unable to care for self at this time. Pt will need to continue basal/bolus therapy upon discharge.  -Plan discussed with pt's team.  Contact info: [phone] (24/7). pager [phone].

## 2020-02-12 DIAGNOSIS — I10 ESSENTIAL (PRIMARY) HYPERTENSION: ICD-10-CM

## 2020-02-12 LAB
APTT BLD: 18.8 SEC — LOW (ref 27.5–36.3)
BASE EXCESS BLDV CALC-SCNC: -1.1 MMOL/L — SIGNIFICANT CHANGE UP (ref -2–2)
CA-I SERPL-SCNC: 1.2 MMOL/L — SIGNIFICANT CHANGE UP (ref 1.12–1.3)
CHLORIDE BLDV-SCNC: 110 MMOL/L — HIGH (ref 96–108)
CO2 BLDV-SCNC: 26 MMOL/L — SIGNIFICANT CHANGE UP (ref 22–30)
GAS PNL BLDV: 141 MMOL/L — SIGNIFICANT CHANGE UP (ref 135–145)
GAS PNL BLDV: SIGNIFICANT CHANGE UP
GAS PNL BLDV: SIGNIFICANT CHANGE UP
GLUCOSE BLDC GLUCOMTR-MCNC: 105 MG/DL — HIGH (ref 70–99)
GLUCOSE BLDC GLUCOMTR-MCNC: 140 MG/DL — HIGH (ref 70–99)
GLUCOSE BLDC GLUCOMTR-MCNC: 149 MG/DL — HIGH (ref 70–99)
GLUCOSE BLDC GLUCOMTR-MCNC: 202 MG/DL — HIGH (ref 70–99)
GLUCOSE BLDC GLUCOMTR-MCNC: 208 MG/DL — HIGH (ref 70–99)
GLUCOSE BLDV-MCNC: 214 MG/DL — HIGH (ref 70–99)
HCO3 BLDV-SCNC: 24 MMOL/L — SIGNIFICANT CHANGE UP (ref 21–29)
HCT VFR BLD CALC: 34.9 % — SIGNIFICANT CHANGE UP (ref 34.5–45)
HCT VFR BLDA CALC: 34 % — LOW (ref 39–50)
HGB BLD CALC-MCNC: 10.9 G/DL — LOW (ref 11.5–15.5)
HGB BLD-MCNC: 10.5 G/DL — LOW (ref 11.5–15.5)
INR BLD: 0.99 RATIO — SIGNIFICANT CHANGE UP (ref 0.88–1.16)
LACTATE BLDV-MCNC: 1.3 MMOL/L — SIGNIFICANT CHANGE UP (ref 0.7–2)
MCHC RBC-ENTMCNC: 30.1 GM/DL — LOW (ref 32–36)
MCHC RBC-ENTMCNC: 30.9 PG — SIGNIFICANT CHANGE UP (ref 27–34)
MCV RBC AUTO: 102.6 FL — HIGH (ref 80–100)
NRBC # BLD: 0 /100 WBCS — SIGNIFICANT CHANGE UP (ref 0–0)
OTHER CELLS CSF MANUAL: 10 ML/DL — LOW (ref 18–22)
PCO2 BLDV: 47 MMHG — SIGNIFICANT CHANGE UP (ref 35–50)
PH BLDV: 7.34 — LOW (ref 7.35–7.45)
PLATELET # BLD AUTO: 379 K/UL — SIGNIFICANT CHANGE UP (ref 150–400)
PO2 BLDV: 40 MMHG — SIGNIFICANT CHANGE UP (ref 25–45)
POTASSIUM BLDV-SCNC: 4.4 MMOL/L — SIGNIFICANT CHANGE UP (ref 3.5–5.3)
PROTHROM AB SERPL-ACNC: 11.3 SEC — SIGNIFICANT CHANGE UP (ref 10–12.9)
RBC # BLD: 3.4 M/UL — LOW (ref 3.8–5.2)
RBC # FLD: 14.9 % — HIGH (ref 10.3–14.5)
SAO2 % BLDV: 64 % — LOW (ref 67–88)
WBC # BLD: 9.98 K/UL — SIGNIFICANT CHANGE UP (ref 3.8–10.5)
WBC # FLD AUTO: 9.98 K/UL — SIGNIFICANT CHANGE UP (ref 3.8–10.5)

## 2020-02-12 PROCEDURE — 70496 CT ANGIOGRAPHY HEAD: CPT | Mod: 26

## 2020-02-12 PROCEDURE — 99233 SBSQ HOSP IP/OBS HIGH 50: CPT

## 2020-02-12 PROCEDURE — 93010 ELECTROCARDIOGRAM REPORT: CPT

## 2020-02-12 PROCEDURE — 70498 CT ANGIOGRAPHY NECK: CPT | Mod: 26

## 2020-02-12 PROCEDURE — 99232 SBSQ HOSP IP/OBS MODERATE 35: CPT

## 2020-02-12 PROCEDURE — 99232 SBSQ HOSP IP/OBS MODERATE 35: CPT | Mod: GC

## 2020-02-12 PROCEDURE — 93306 TTE W/DOPPLER COMPLETE: CPT | Mod: 26

## 2020-02-12 RX ORDER — INSULIN GLARGINE 100 [IU]/ML
20 INJECTION, SOLUTION SUBCUTANEOUS EVERY MORNING
Refills: 0 | Status: DISCONTINUED | OUTPATIENT
Start: 2020-02-12 | End: 2020-02-12

## 2020-02-12 RX ORDER — PANTOPRAZOLE SODIUM 20 MG/1
40 TABLET, DELAYED RELEASE ORAL DAILY
Refills: 0 | Status: DISCONTINUED | OUTPATIENT
Start: 2020-02-12 | End: 2020-02-28

## 2020-02-12 RX ORDER — HEPARIN SODIUM 5000 [USP'U]/ML
INJECTION INTRAVENOUS; SUBCUTANEOUS
Qty: 25000 | Refills: 0 | Status: DISCONTINUED | OUTPATIENT
Start: 2020-02-12 | End: 2020-02-12

## 2020-02-12 RX ORDER — METOPROLOL TARTRATE 50 MG
2.5 TABLET ORAL EVERY 6 HOURS
Refills: 0 | Status: DISCONTINUED | OUTPATIENT
Start: 2020-02-12 | End: 2020-02-19

## 2020-02-12 RX ORDER — LEVOTHYROXINE SODIUM 125 MCG
87.5 TABLET ORAL AT BEDTIME
Refills: 0 | Status: DISCONTINUED | OUTPATIENT
Start: 2020-02-12 | End: 2020-02-12

## 2020-02-12 RX ORDER — MORPHINE SULFATE 50 MG/1
4 CAPSULE, EXTENDED RELEASE ORAL EVERY 6 HOURS
Refills: 0 | Status: DISCONTINUED | OUTPATIENT
Start: 2020-02-12 | End: 2020-02-19

## 2020-02-12 RX ORDER — LEVOTHYROXINE SODIUM 125 MCG
87.5 TABLET ORAL ONCE
Refills: 0 | Status: COMPLETED | OUTPATIENT
Start: 2020-02-12 | End: 2020-02-12

## 2020-02-12 RX ORDER — KETOROLAC TROMETHAMINE 30 MG/ML
15 SYRINGE (ML) INJECTION EVERY 6 HOURS
Refills: 0 | Status: DISCONTINUED | OUTPATIENT
Start: 2020-02-12 | End: 2020-02-12

## 2020-02-12 RX ORDER — LEVOTHYROXINE SODIUM 125 MCG
87.5 TABLET ORAL AT BEDTIME
Refills: 0 | Status: DISCONTINUED | OUTPATIENT
Start: 2020-02-12 | End: 2020-02-28

## 2020-02-12 RX ORDER — INSULIN GLARGINE 100 [IU]/ML
20 INJECTION, SOLUTION SUBCUTANEOUS EVERY MORNING
Refills: 0 | Status: DISCONTINUED | OUTPATIENT
Start: 2020-02-12 | End: 2020-02-17

## 2020-02-12 RX ADMIN — INSULIN GLARGINE 20 UNIT(S): 100 INJECTION, SOLUTION SUBCUTANEOUS at 13:50

## 2020-02-12 RX ADMIN — Medication 2.5 MILLIGRAM(S): at 13:00

## 2020-02-12 RX ADMIN — Medication 2.5 MILLIGRAM(S): at 18:06

## 2020-02-12 RX ADMIN — Medication 87.5 MICROGRAM(S): at 10:58

## 2020-02-12 RX ADMIN — Medication 12.5 MILLIGRAM(S): at 18:07

## 2020-02-12 RX ADMIN — Medication 2.5 MILLIGRAM(S): at 06:15

## 2020-02-12 RX ADMIN — Medication 87.5 MICROGRAM(S): at 21:26

## 2020-02-12 RX ADMIN — PANTOPRAZOLE SODIUM 40 MILLIGRAM(S): 20 TABLET, DELAYED RELEASE ORAL at 13:46

## 2020-02-12 RX ADMIN — Medication 12 MILLIGRAM(S): at 06:14

## 2020-02-12 RX ADMIN — Medication 4: at 10:57

## 2020-02-12 RX ADMIN — Medication 4: at 13:58

## 2020-02-12 NOTE — PROGRESS NOTE ADULT - ASSESSMENT
Impression: She could have a transverse venous sinus thrombosis, which can possibly contribute to change in mental status. Will need to rule out.    Plan:  -obtain urgent CT venogram w/ contrast  -hypercoagulable workup  -start heparin gtt Impression: She could have a transverse venous sinus thrombosis, which can possibly contribute to change in mental status. Will need to rule out.    Plan:  -obtain urgent CT venogram w/ contrast  -hypercoagulable workup

## 2020-02-12 NOTE — PROGRESS NOTE ADULT - PROBLEM SELECTOR PLAN 7
- prednisone 15 mg switched to methylprednisolone 12 mg IV  - Tramadol PRN - prednisone 15 mg switched to methylprednisolone IV  - Tramadol PRN

## 2020-02-12 NOTE — PROGRESS NOTE ADULT - PROBLEM SELECTOR PLAN 5
On met ER 50 at home  - c/w metoprolol 2.5 q6hrs On met ER 50 at home  - c/w metoprolol 2.5 q6hrs  - CTM bp

## 2020-02-12 NOTE — CHART NOTE - NSCHARTNOTEFT_GEN_A_CORE
RRT called for AMS    Patient seen and examined at bedside. Vital signs notable for tachycardia to the 110s, BP, O2 sat, temp WNL. Patient lethargic on exam. Per nurse and floor team, patient normally very active and often combative therefore acute mental status change. Not responding to verbal stimuli. Withdrawing to pain and moving all four extremities. PERRL. Breathing non laboured. Patient received valproic acid (new med, increased dosage), gabapentin, and primidone earlier this evening. Naloxone given to assess for oversedation from valproic acid however patients mental status did not improve after .8 x 2. ABG drawn to assess for hypercapnea however PCO2 not elevated. Patient taken emergently to CT scan which Asymetric high density involving the right transverse sinus, which is new when compared to prior brain ct 1/12/20. Findings may represent thrombosis and less likely hemorrhage. Neurology consulted and to see patient. Patient returned to 5mon. Son updated at bedside 1-2 hours after RRT and expressed frustration that his mother had been given valproic acid. Will discuss with neuro need for further imaging and management of possible thrombosis.

## 2020-02-12 NOTE — PROGRESS NOTE ADULT - PROBLEM SELECTOR PLAN 9
- DVT ppx: SWITCHED TO SCDS GIVEN BLEED  - Diet: DASH diet w/evening snack  - Dispo: pending PT    - FYI holding primidone, simvastatin, gabapentin as of 2/12 as no IV equivalent available   - ADDENDUM: Spoke to pts family members about decision making.  The children agree that Norris will serve as the point of  and will be the final decision maker representing the family for when decisions have to be made.  They are in agreement with moving forward with sedation/potential intubation for pt to undergo further medical testing. Discussions had on 2/3/20. - Davis Kramer

## 2020-02-12 NOTE — PROGRESS NOTE ADULT - PROBLEM SELECTOR PLAN 6
- Bcx + for GPR on 1/12, 1 out of 2 bottles   -+Bcx from 1/12 sent to [facility], currently no results  - Blood cx obtained on 1/25 no growth X 5 days.  - Patient noted to have leukocytosis, but remains afebrile   - will monitor off abx for now as repeat cultures ngtd

## 2020-02-12 NOTE — PROGRESS NOTE ADULT - SUBJECTIVE AND OBJECTIVE BOX
***************************************************************  Dr. Kellie Bernal  Internal Medicine   [facility] Pager: [phone]  [facility] Pager: [phone]   ***************************************************************    AFTAB BASS  64y  MRN: [id number]    Subjective:    Patient is a 64y old  Female who presents with a chief complaint of change in mental status (12 Feb 2020 03:27)      Interval history/overnight events:    RRT called at 9:30 PM. Patient altered. Not responding to verbal stimuli but withdrawing from pain in all extremities. LKW was 8:30 PM when patient was a little lethargic. CT head w/ new       MEDICATIONS  (STANDING):  budesonide  80 MICROgram(s)/formoterol 4.5 MICROgram(s) Inhaler 2 Puff(s) Inhalation two times a day  dextrose 5%. 1000 milliLiter(s) (50 mL/Hr) IV Continuous <Continuous>  dextrose 50% Injectable 12.5 Gram(s) IV Push once  dextrose 50% Injectable 25 Gram(s) IV Push once  dextrose 50% Injectable 25 Gram(s) IV Push once  influenza   Vaccine 0.5 milliLiter(s) IntraMuscular once  insulin glargine Injectable (LANTUS) 34 Unit(s) SubCutaneous every morning  insulin lispro (HumaLOG) corrective regimen sliding scale   SubCutaneous three times a day before meals  insulin lispro (HumaLOG) corrective regimen sliding scale   SubCutaneous at bedtime  insulin lispro Injectable (HumaLOG) 9 Unit(s) SubCutaneous three times a day before meals  levothyroxine Injectable 87.5 MICROGram(s) IV Push once  metoprolol tartrate Injectable 2.5 milliGRAM(s) IV Push every 6 hours  pantoprazole  Injectable 40 milliGRAM(s) IV Push daily  primidone 50 milliGRAM(s) Oral at bedtime  simvastatin 20 milliGRAM(s) Oral at bedtime    MEDICATIONS  (PRN):  acetaminophen   Tablet .. 650 milliGRAM(s) Oral every 6 hours PRN Temp greater or equal to 38C (100.4F), Mild Pain (1 - 3), Moderate Pain (4 - 6)  albuterol/ipratropium for Nebulization 3 milliLiter(s) Nebulizer every 6 hours PRN Shortness of Breath and/or Wheezing  dextrose 40% Gel 15 Gram(s) Oral once PRN Blood Glucose LESS THAN 70 milliGRAM(s)/deciLiter  glucagon  Injectable 1 milliGRAM(s) IntraMuscular once PRN Glucose <70 milliGRAM(s)/deciLiter  haloperidol    Injectable 2 milliGRAM(s) IntraMuscular every 4 hours PRN Agitation  haloperidol    Injectable 2 milliGRAM(s) IV Push every 4 hours PRN Agitation  ketorolac   Injectable 15 milliGRAM(s) IV Push every 6 hours PRN Moderate Pain (4 - 6)  melatonin 3 milliGRAM(s) Oral at bedtime PRN Insomnia  morphine  - Injectable 4 milliGRAM(s) IV Push every 6 hours PRN Moderate Pain (4 - 6)        Objective:    Vitals: Vital Signs Last 24 Hrs  T(C): 37.2 (02-12-20 @ 04:23), Max: 37.6 (02-11-20 @ 13:36)  T(F): 98.9 (02-12-20 @ 04:23), Max: 99.7 (02-11-20 @ 13:36)  HR: 100 (02-12-20 @ 04:23) (78 - 108)  BP: 186/85 (02-12-20 @ 04:23) (112/77 - 186/85)  BP(mean): --  RR: 17 (02-12-20 @ 04:23) (17 - 18)  SpO2: 100% (02-12-20 @ 04:23) (93% - 100%)            I&O's Summary    11 Feb 2020 07:01  -  12 Feb 2020 07:00  --------------------------------------------------------  IN: 360 mL / OUT: 0 mL / NET: 360 mL        LABS:    02-11    142  |  105  |  20  ----------------------------<  149<H>  5.4<H>   |  20<L>  |  0.85    Ca    9.6      11 Feb 2020 22:22  Phos  3.3     02-11  Mg     2.2     02-11    TPro  7.4  /  Alb  3.8  /  TBili  0.1<L>  /  DBili  x   /  AST  34  /  ALT  18  /  AlkPhos  87  02-11    PT/INR - ( 12 Feb 2020 05:30 )   PT: 11.3 sec;   INR: 0.99 ratio         PTT - ( 12 Feb 2020 05:30 )  PTT:18.8 sec                ABG - ( 11 Feb 2020 22:04 )  pH, Arterial: 7.44  pH, Blood: x     /  pCO2: 37    /  pO2: 124   / HCO3: 24    / Base Excess: .7    /  SaO2: 98        < from: CT Head No Cont (02.11.20 @ 22:34) >  INTERPRETATION:  Asymetric high density involving the right transverse sinus, which is new when compared to prior brain ct 1/12/20. Findings may represent thrombosis and less likely hemorrhage. CTV can be obtained if clinically indicated.    Discussed with Dr. Loving    < end of copied text >            CAPILLARY BLOOD GLUCOSE      POCT Blood Glucose.: 105 mg/dL (12 Feb 2020 06:11)  POCT Blood Glucose.: 146 mg/dL (11 Feb 2020 21:41)  POCT Blood Glucose.: 170 mg/dL (11 Feb 2020 18:34)  POCT Blood Glucose.: 181 mg/dL (11 Feb 2020 13:18)  POCT Blood Glucose.: 142 mg/dL (11 Feb 2020 09:47)          RADIOLOGY & ADDITIONAL TESTS:                Imaging Personally Reviewed:  [x ] YES  [ ] NO    Consultants involved in case:   Consultant(s) Notes Reviewed:  [x ] YES  [ ] NO:   Care Discussed with Consultants/Other Providers [ x] YES  [ ] NO ***************************************************************  Dr. Kellie Bernal  Internal Medicine   [facility] Pager: [phone]  [facility] Pager: [phone]   ***************************************************************    AFTAB BASS  64y  MRN: [id number]    Subjective:    Patient is a 64y old  Female who presents with a chief complaint of change in mental status (12 Feb 2020 03:27)      Interval history/overnight events:    RRT called at 9:30 PM. Patient altered. Not responding to verbal stimuli but withdrawing from pain in all extremities. LKW was 8:30 PM when patient was a little lethargic. CT head w/ new right transverse sinus thrombosis. CTA head / neck w/ venous phasing pending.       MEDICATIONS  (STANDING):  budesonide  80 MICROgram(s)/formoterol 4.5 MICROgram(s) Inhaler 2 Puff(s) Inhalation two times a day  dextrose 5%. 1000 milliLiter(s) (50 mL/Hr) IV Continuous <Continuous>  dextrose 50% Injectable 12.5 Gram(s) IV Push once  dextrose 50% Injectable 25 Gram(s) IV Push once  dextrose 50% Injectable 25 Gram(s) IV Push once  influenza   Vaccine 0.5 milliLiter(s) IntraMuscular once  insulin glargine Injectable (LANTUS) 34 Unit(s) SubCutaneous every morning  insulin lispro (HumaLOG) corrective regimen sliding scale   SubCutaneous three times a day before meals  insulin lispro (HumaLOG) corrective regimen sliding scale   SubCutaneous at bedtime  insulin lispro Injectable (HumaLOG) 9 Unit(s) SubCutaneous three times a day before meals  levothyroxine Injectable 87.5 MICROGram(s) IV Push once  metoprolol tartrate Injectable 2.5 milliGRAM(s) IV Push every 6 hours  pantoprazole  Injectable 40 milliGRAM(s) IV Push daily  primidone 50 milliGRAM(s) Oral at bedtime  simvastatin 20 milliGRAM(s) Oral at bedtime    MEDICATIONS  (PRN):  acetaminophen   Tablet .. 650 milliGRAM(s) Oral every 6 hours PRN Temp greater or equal to 38C (100.4F), Mild Pain (1 - 3), Moderate Pain (4 - 6)  albuterol/ipratropium for Nebulization 3 milliLiter(s) Nebulizer every 6 hours PRN Shortness of Breath and/or Wheezing  dextrose 40% Gel 15 Gram(s) Oral once PRN Blood Glucose LESS THAN 70 milliGRAM(s)/deciLiter  glucagon  Injectable 1 milliGRAM(s) IntraMuscular once PRN Glucose <70 milliGRAM(s)/deciLiter  haloperidol    Injectable 2 milliGRAM(s) IntraMuscular every 4 hours PRN Agitation  haloperidol    Injectable 2 milliGRAM(s) IV Push every 4 hours PRN Agitation  ketorolac   Injectable 15 milliGRAM(s) IV Push every 6 hours PRN Moderate Pain (4 - 6)  melatonin 3 milliGRAM(s) Oral at bedtime PRN Insomnia  morphine  - Injectable 4 milliGRAM(s) IV Push every 6 hours PRN Moderate Pain (4 - 6)        Objective:    Vitals: Vital Signs Last 24 Hrs  T(C): 37.2 (02-12-20 @ 04:23), Max: 37.6 (02-11-20 @ 13:36)  T(F): 98.9 (02-12-20 @ 04:23), Max: 99.7 (02-11-20 @ 13:36)  HR: 100 (02-12-20 @ 04:23) (78 - 108)  BP: 186/85 (02-12-20 @ 04:23) (112/77 - 186/85)  BP(mean): --  RR: 17 (02-12-20 @ 04:23) (17 - 18)  SpO2: 100% (02-12-20 @ 04:23) (93% - 100%)            I&O's Summary    11 Feb 2020 07:01  -  12 Feb 2020 07:00  --------------------------------------------------------  IN: 360 mL / OUT: 0 mL / NET: 360 mL        LABS:    02-11    142  |  105  |  20  ----------------------------<  149<H>  5.4<H>   |  20<L>  |  0.85    Ca    9.6      11 Feb 2020 22:22  Phos  3.3     02-11  Mg     2.2     02-11    TPro  7.4  /  Alb  3.8  /  TBili  0.1<L>  /  DBili  x   /  AST  34  /  ALT  18  /  AlkPhos  87  02-11    PT/INR - ( 12 Feb 2020 05:30 )   PT: 11.3 sec;   INR: 0.99 ratio         PTT - ( 12 Feb 2020 05:30 )  PTT:18.8 sec                ABG - ( 11 Feb 2020 22:04 )  pH, Arterial: 7.44  pH, Blood: x     /  pCO2: 37    /  pO2: 124   / HCO3: 24    / Base Excess: .7    /  SaO2: 98        < from: CT Head No Cont (02.11.20 @ 22:34) >  INTERPRETATION:  Asymetric high density involving the right transverse sinus, which is new when compared to prior brain ct 1/12/20. Findings may represent thrombosis and less likely hemorrhage. CTV can be obtained if clinically indicated.    Discussed with Dr. Loving    < end of copied text >            CAPILLARY BLOOD GLUCOSE      POCT Blood Glucose.: 105 mg/dL (12 Feb 2020 06:11)  POCT Blood Glucose.: 146 mg/dL (11 Feb 2020 21:41)  POCT Blood Glucose.: 170 mg/dL (11 Feb 2020 18:34)  POCT Blood Glucose.: 181 mg/dL (11 Feb 2020 13:18)  POCT Blood Glucose.: 142 mg/dL (11 Feb 2020 09:47)          RADIOLOGY & ADDITIONAL TESTS:                Imaging Personally Reviewed:  [x ] YES  [ ] NO    Consultants involved in case:   Consultant(s) Notes Reviewed:  [x ] YES  [ ] NO:   Care Discussed with Consultants/Other Providers [ x] YES  [ ] NO ***************************************************************  Dr. Kellie Bernal  Internal Medicine   [facility] Pager: [phone]  [facility] Pager: [phone]   ***************************************************************    AFTAB BASS  64y  MRN: [id number]    Subjective:    Patient is a 64y old  Female who presents with a chief complaint of change in mental status (12 Feb 2020 03:27)      Interval history/overnight events:    RRT called at 9:30 PM. Patient altered. Not responding to verbal stimuli but withdrawing from pain in all extremities. LKW was 8:30 PM when patient was a little lethargic. CT head PRELIM read w/ new right transverse sinus thrombosis. CTA head / neck w/ venous phasing ordered per neuro's suggestion. FINAL CT head read as known 3mm SDH.     Patient seen and examined at bedside, seen w/ eyes open on entering room and speaking in short sentences w/ sister. Awake and alert. Patient not opening eyes for exam and resisting eye exam. ROS not assessed due to mental status.         MEDICATIONS  (STANDING):  budesonide  80 MICROgram(s)/formoterol 4.5 MICROgram(s) Inhaler 2 Puff(s) Inhalation two times a day  dextrose 5%. 1000 milliLiter(s) (50 mL/Hr) IV Continuous <Continuous>  dextrose 50% Injectable 12.5 Gram(s) IV Push once  dextrose 50% Injectable 25 Gram(s) IV Push once  dextrose 50% Injectable 25 Gram(s) IV Push once  influenza   Vaccine 0.5 milliLiter(s) IntraMuscular once  insulin glargine Injectable (LANTUS) 34 Unit(s) SubCutaneous every morning  insulin lispro (HumaLOG) corrective regimen sliding scale   SubCutaneous three times a day before meals  insulin lispro (HumaLOG) corrective regimen sliding scale   SubCutaneous at bedtime  insulin lispro Injectable (HumaLOG) 9 Unit(s) SubCutaneous three times a day before meals  levothyroxine Injectable 87.5 MICROGram(s) IV Push once  metoprolol tartrate Injectable 2.5 milliGRAM(s) IV Push every 6 hours  pantoprazole  Injectable 40 milliGRAM(s) IV Push daily  primidone 50 milliGRAM(s) Oral at bedtime  simvastatin 20 milliGRAM(s) Oral at bedtime    MEDICATIONS  (PRN):  acetaminophen   Tablet .. 650 milliGRAM(s) Oral every 6 hours PRN Temp greater or equal to 38C (100.4F), Mild Pain (1 - 3), Moderate Pain (4 - 6)  albuterol/ipratropium for Nebulization 3 milliLiter(s) Nebulizer every 6 hours PRN Shortness of Breath and/or Wheezing  dextrose 40% Gel 15 Gram(s) Oral once PRN Blood Glucose LESS THAN 70 milliGRAM(s)/deciLiter  glucagon  Injectable 1 milliGRAM(s) IntraMuscular once PRN Glucose <70 milliGRAM(s)/deciLiter  haloperidol    Injectable 2 milliGRAM(s) IntraMuscular every 4 hours PRN Agitation  haloperidol    Injectable 2 milliGRAM(s) IV Push every 4 hours PRN Agitation  ketorolac   Injectable 15 milliGRAM(s) IV Push every 6 hours PRN Moderate Pain (4 - 6)  melatonin 3 milliGRAM(s) Oral at bedtime PRN Insomnia  morphine  - Injectable 4 milliGRAM(s) IV Push every 6 hours PRN Moderate Pain (4 - 6)        Objective:    Vitals: Vital Signs Last 24 Hrs  T(C): 37.2 (02-12-20 @ 04:23), Max: 37.6 (02-11-20 @ 13:36)  T(F): 98.9 (02-12-20 @ 04:23), Max: 99.7 (02-11-20 @ 13:36)  HR: 100 (02-12-20 @ 04:23) (78 - 108)  BP: 186/85 (02-12-20 @ 04:23) (112/77 - 186/85)  BP(mean): --  RR: 17 (02-12-20 @ 04:23) (17 - 18)  SpO2: 100% (02-12-20 @ 04:23) (93% - 100%)            I&O's Summary    11 Feb 2020 07:01  -  12 Feb 2020 07:00  --------------------------------------------------------  IN: 360 mL / OUT: 0 mL / NET: 360 mL    PHYSICAL EXAM:  GENERAL: elderly female, agitated and delirious respiring on 3L   HEENT: PERRL, no scleral icterus, no head and neck lad;   CHEST/LUNG: CTAB, no wheezing, rhonchi   HEART: RRR, No MRG  ABDOMEN: soft, distended, normoactive, nontender to palpation   SKIN: skin thickening over lower abdomen with chronic wound underneath pad; patient refusing further eval   NERVOUS SYSTEM: Alert & Oriented X 2  PSYCH: agitated, delirious (though more approachable than before)       LABS:    02-11    142  |  105  |  20  ----------------------------<  149<H>  5.4<H>   |  20<L>  |  0.85    Ca    9.6      11 Feb 2020 22:22  Phos  3.3     02-11  Mg     2.2     02-11    TPro  7.4  /  Alb  3.8  /  TBili  0.1<L>  /  DBili  x   /  AST  34  /  ALT  18  /  AlkPhos  87  02-11    PT/INR - ( 12 Feb 2020 05:30 )   PT: 11.3 sec;   INR: 0.99 ratio         PTT - ( 12 Feb 2020 05:30 )  PTT:18.8 sec                ABG - ( 11 Feb 2020 22:04 )  pH, Arterial: 7.44  pH, Blood: x     /  pCO2: 37    /  pO2: 124   / HCO3: 24    / Base Excess: .7    /  SaO2: 98        < from: CT Head No Cont (02.11.20 @ 22:34) >  INTERPRETATION:  Asymetric high density involving the right transverse sinus, which is new when compared to prior brain ct 1/12/20. Findings may represent thrombosis and less likely hemorrhage. CTV can be obtained if clinically indicated.    Discussed with Dr. Loving    < end of copied text >            CAPILLARY BLOOD GLUCOSE      POCT Blood Glucose.: 105 mg/dL (12 Feb 2020 06:11)  POCT Blood Glucose.: 146 mg/dL (11 Feb 2020 21:41)  POCT Blood Glucose.: 170 mg/dL (11 Feb 2020 18:34)  POCT Blood Glucose.: 181 mg/dL (11 Feb 2020 13:18)  POCT Blood Glucose.: 142 mg/dL (11 Feb 2020 09:47)          RADIOLOGY & ADDITIONAL TESTS:    < from: CT Head No Cont (02.11.20 @ 22:34) >  FINDINGS:    Similar-appearing thin right lateral convexity subdural collection measuring 3 mm in greatest depth.    No displaced calvarial fracture. No appreciable scalp hematoma.    No midline shift, effacement of basal cisterns, or hydrocephalus. No parenchymal hemorrhage, vasogenic edema, or CT evidence of acute territorial infarct. Mild white matter microvascular ischemic disease.    Polyp/retention cyst in the right sphenoid sinus. Remaining visualized paranasal sinuses and mastoid air cells clear    IMPRESSION:    Similar-appearing thin right lateral convexity subdural collection measuring 3 mm in greatest depth.    No acute parenchymal hemorrhage, mass effect, vasogenic edema, or evidence of acute territorial infarct.         < end of copied text >                  Imaging Personally Reviewed:  [x ] YES  [ ] NO    Consultants involved in case:   Consultant(s) Notes Reviewed:  [x ] YES  [ ] NO:   Care Discussed with Consultants/Other Providers [ x] YES  [ ] NO ***************************************************************  Dr. Kellie Bernal  Internal Medicine   [facility] Pager: [phone]  [facility] Pager: [phone]   ***************************************************************    AFTAB BASS  64y  MRN: [id number]    Subjective:    Patient is a 64y old  Female who presents with a chief complaint of change in mental status (12 Feb 2020 03:27)      Interval history/overnight events:    RRT called at 9:30 PM. Patient altered. Not responding to verbal stimuli but withdrawing from pain in all extremities. LKW was 8:30 PM when patient was a little lethargic. CT head PRELIM read w/ new right transverse sinus thrombosis. CTA head / neck w/ venous phasing ordered per neuro's suggestion. FINAL CT head read as known 3mm SDH.     Patient seen and examined at bedside, seen w/ eyes open on entering room and speaking in short sentences w/ sister. Awake and alert. Patient not opening eyes for exam and resisting eye exam. ROS not assessed due to mental status.         MEDICATIONS  (STANDING):  budesonide  80 MICROgram(s)/formoterol 4.5 MICROgram(s) Inhaler 2 Puff(s) Inhalation two times a day  dextrose 5%. 1000 milliLiter(s) (50 mL/Hr) IV Continuous <Continuous>  dextrose 50% Injectable 12.5 Gram(s) IV Push once  dextrose 50% Injectable 25 Gram(s) IV Push once  dextrose 50% Injectable 25 Gram(s) IV Push once  influenza   Vaccine 0.5 milliLiter(s) IntraMuscular once  insulin glargine Injectable (LANTUS) 34 Unit(s) SubCutaneous every morning  insulin lispro (HumaLOG) corrective regimen sliding scale   SubCutaneous three times a day before meals  insulin lispro (HumaLOG) corrective regimen sliding scale   SubCutaneous at bedtime  insulin lispro Injectable (HumaLOG) 9 Unit(s) SubCutaneous three times a day before meals  levothyroxine Injectable 87.5 MICROGram(s) IV Push once  metoprolol tartrate Injectable 2.5 milliGRAM(s) IV Push every 6 hours  pantoprazole  Injectable 40 milliGRAM(s) IV Push daily  primidone 50 milliGRAM(s) Oral at bedtime  simvastatin 20 milliGRAM(s) Oral at bedtime    MEDICATIONS  (PRN):  acetaminophen   Tablet .. 650 milliGRAM(s) Oral every 6 hours PRN Temp greater or equal to 38C (100.4F), Mild Pain (1 - 3), Moderate Pain (4 - 6)  albuterol/ipratropium for Nebulization 3 milliLiter(s) Nebulizer every 6 hours PRN Shortness of Breath and/or Wheezing  dextrose 40% Gel 15 Gram(s) Oral once PRN Blood Glucose LESS THAN 70 milliGRAM(s)/deciLiter  glucagon  Injectable 1 milliGRAM(s) IntraMuscular once PRN Glucose <70 milliGRAM(s)/deciLiter  haloperidol    Injectable 2 milliGRAM(s) IntraMuscular every 4 hours PRN Agitation  haloperidol    Injectable 2 milliGRAM(s) IV Push every 4 hours PRN Agitation  ketorolac   Injectable 15 milliGRAM(s) IV Push every 6 hours PRN Moderate Pain (4 - 6)  melatonin 3 milliGRAM(s) Oral at bedtime PRN Insomnia  morphine  - Injectable 4 milliGRAM(s) IV Push every 6 hours PRN Moderate Pain (4 - 6)        Objective:    Vitals: Vital Signs Last 24 Hrs  T(C): 37.2 (02-12-20 @ 04:23), Max: 37.6 (02-11-20 @ 13:36)  T(F): 98.9 (02-12-20 @ 04:23), Max: 99.7 (02-11-20 @ 13:36)  HR: 100 (02-12-20 @ 04:23) (78 - 108)  BP: 186/85 (02-12-20 @ 04:23) (112/77 - 186/85)  BP(mean): --  RR: 17 (02-12-20 @ 04:23) (17 - 18)  SpO2: 100% (02-12-20 @ 04:23) (93% - 100%)            I&O's Summary    11 Feb 2020 07:01  -  12 Feb 2020 07:00  --------------------------------------------------------  IN: 360 mL / OUT: 0 mL / NET: 360 mL    PHYSICAL EXAM:  GENERAL: elderly female, agitated and delirious respiring on 3L   HEENT: PERRL, no scleral icterus, no head and neck lad   CHEST/LUNG: CTAB, no wheezing, rhonchi   HEART: RRR, No MGR  ABDOMEN: soft, distended, normoactive, nontender to palpation, outpouching at SBO evisceration site   SKIN: skin thickening over lower abdomen with chronic wound underneath pad; patient refusing further eval   NERVOUS SYSTEM: Alert & Oriented X O; moving upper extremity bl, but NOT withdrawing from pain bl upper extremity, withdrawing from pain bl LE, poor lower extremity patellar reflex, resisting eye exam   PSYCH: calm, cooperative       LABS:    02-11    142  |  105  |  20  ----------------------------<  149<H>  5.4<H>   |  20<L>  |  0.85    Ca    9.6      11 Feb 2020 22:22  Phos  3.3     02-11  Mg     2.2     02-11    TPro  7.4  /  Alb  3.8  /  TBili  0.1<L>  /  DBili  x   /  AST  34  /  ALT  18  /  AlkPhos  87  02-11    PT/INR - ( 12 Feb 2020 05:30 )   PT: 11.3 sec;   INR: 0.99 ratio         PTT - ( 12 Feb 2020 05:30 )  PTT:18.8 sec                ABG - ( 11 Feb 2020 22:04 )  pH, Arterial: 7.44  pH, Blood: x     /  pCO2: 37    /  pO2: 124   / HCO3: 24    / Base Excess: .7    /  SaO2: 98        < from: CT Head No Cont (02.11.20 @ 22:34) >  INTERPRETATION:  Asymetric high density involving the right transverse sinus, which is new when compared to prior brain ct 1/12/20. Findings may represent thrombosis and less likely hemorrhage. CTV can be obtained if clinically indicated.    Discussed with Dr. Loving    < end of copied text >            CAPILLARY BLOOD GLUCOSE      POCT Blood Glucose.: 105 mg/dL (12 Feb 2020 06:11)  POCT Blood Glucose.: 146 mg/dL (11 Feb 2020 21:41)  POCT Blood Glucose.: 170 mg/dL (11 Feb 2020 18:34)  POCT Blood Glucose.: 181 mg/dL (11 Feb 2020 13:18)  POCT Blood Glucose.: 142 mg/dL (11 Feb 2020 09:47)          RADIOLOGY & ADDITIONAL TESTS:    < from: CT Head No Cont (02.11.20 @ 22:34) >  FINDINGS:    Similar-appearing thin right lateral convexity subdural collection measuring 3 mm in greatest depth.    No displaced calvarial fracture. No appreciable scalp hematoma.    No midline shift, effacement of basal cisterns, or hydrocephalus. No parenchymal hemorrhage, vasogenic edema, or CT evidence of acute territorial infarct. Mild white matter microvascular ischemic disease.    Polyp/retention cyst in the right sphenoid sinus. Remaining visualized paranasal sinuses and mastoid air cells clear    IMPRESSION:    Similar-appearing thin right lateral convexity subdural collection measuring 3 mm in greatest depth.    No acute parenchymal hemorrhage, mass effect, vasogenic edema, or evidence of acute territorial infarct.         < end of copied text >                  Imaging Personally Reviewed:  [x ] YES  [ ] NO    Consultants involved in case:   Consultant(s) Notes Reviewed:  [x ] YES  [ ] NO:   Care Discussed with Consultants/Other Providers [ x] YES  [ ] NO ***************************************************************  Dr. Kellie Bernal  Internal Medicine   [facility] Pager: [phone]  [facility] Pager: [phone]   ***************************************************************    AFTAB BASS  64y  MRN: [id number]    Subjective:    Patient is a 64y old  Female who presents with a chief complaint of change in mental status (12 Feb 2020 03:27)      Interval history/overnight events:    RRT called at 9:30 PM. Patient altered. Not responding to verbal stimuli but withdrawing from pain in all extremities. LKW was 8:30 PM when patient was a little lethargic. s/p narcan x 2 for possible contribution of depakote. CT head PRELIM read w/ new right transverse sinus thrombosis. CTA head / neck w/ venous phasing ordered per neuro's suggestion. FINAL CT head read as known 3mm SDH.     Patient seen and examined at bedside, seen w/ eyes open on entering room and speaking in short sentences w/ sister. Awake and alert. Patient not opening eyes for exam and resisting eye exam. ROS not assessed due to mental status.         MEDICATIONS  (STANDING):  budesonide  80 MICROgram(s)/formoterol 4.5 MICROgram(s) Inhaler 2 Puff(s) Inhalation two times a day  dextrose 5%. 1000 milliLiter(s) (50 mL/Hr) IV Continuous <Continuous>  dextrose 50% Injectable 12.5 Gram(s) IV Push once  dextrose 50% Injectable 25 Gram(s) IV Push once  dextrose 50% Injectable 25 Gram(s) IV Push once  influenza   Vaccine 0.5 milliLiter(s) IntraMuscular once  insulin glargine Injectable (LANTUS) 34 Unit(s) SubCutaneous every morning  insulin lispro (HumaLOG) corrective regimen sliding scale   SubCutaneous three times a day before meals  insulin lispro (HumaLOG) corrective regimen sliding scale   SubCutaneous at bedtime  insulin lispro Injectable (HumaLOG) 9 Unit(s) SubCutaneous three times a day before meals  levothyroxine Injectable 87.5 MICROGram(s) IV Push once  metoprolol tartrate Injectable 2.5 milliGRAM(s) IV Push every 6 hours  pantoprazole  Injectable 40 milliGRAM(s) IV Push daily  primidone 50 milliGRAM(s) Oral at bedtime  simvastatin 20 milliGRAM(s) Oral at bedtime    MEDICATIONS  (PRN):  acetaminophen   Tablet .. 650 milliGRAM(s) Oral every 6 hours PRN Temp greater or equal to 38C (100.4F), Mild Pain (1 - 3), Moderate Pain (4 - 6)  albuterol/ipratropium for Nebulization 3 milliLiter(s) Nebulizer every 6 hours PRN Shortness of Breath and/or Wheezing  dextrose 40% Gel 15 Gram(s) Oral once PRN Blood Glucose LESS THAN 70 milliGRAM(s)/deciLiter  glucagon  Injectable 1 milliGRAM(s) IntraMuscular once PRN Glucose <70 milliGRAM(s)/deciLiter  haloperidol    Injectable 2 milliGRAM(s) IntraMuscular every 4 hours PRN Agitation  haloperidol    Injectable 2 milliGRAM(s) IV Push every 4 hours PRN Agitation  ketorolac   Injectable 15 milliGRAM(s) IV Push every 6 hours PRN Moderate Pain (4 - 6)  melatonin 3 milliGRAM(s) Oral at bedtime PRN Insomnia  morphine  - Injectable 4 milliGRAM(s) IV Push every 6 hours PRN Moderate Pain (4 - 6)        Objective:    Vitals: Vital Signs Last 24 Hrs  T(C): 37.2 (02-12-20 @ 04:23), Max: 37.6 (02-11-20 @ 13:36)  T(F): 98.9 (02-12-20 @ 04:23), Max: 99.7 (02-11-20 @ 13:36)  HR: 100 (02-12-20 @ 04:23) (78 - 108)  BP: 186/85 (02-12-20 @ 04:23) (112/77 - 186/85)  BP(mean): --  RR: 17 (02-12-20 @ 04:23) (17 - 18)  SpO2: 100% (02-12-20 @ 04:23) (93% - 100%)            I&O's Summary    11 Feb 2020 07:01  -  12 Feb 2020 07:00  --------------------------------------------------------  IN: 360 mL / OUT: 0 mL / NET: 360 mL    PHYSICAL EXAM:  GENERAL: elderly female, agitated and delirious respiring on 3L   HEENT: PERRL, no scleral icterus, no head and neck lad   CHEST/LUNG: CTAB, no wheezing, rhonchi   HEART: RRR, No MGR  ABDOMEN: soft, distended, normoactive, nontender to palpation, outpouching at SBO evisceration site   SKIN: skin thickening over lower abdomen with chronic wound underneath pad; patient refusing further eval   NERVOUS SYSTEM: Alert & Oriented X O; no facial asymmetry or facial droop, moving upper extremity bl, but NOT withdrawing from pain bl upper extremity, withdrawing from pain bl LE, poor lower extremity patellar reflex, resisting eye exam   PSYCH: calm, cooperative       LABS:    02-11    142  |  105  |  20  ----------------------------<  149<H>  5.4<H>   |  20<L>  |  0.85    Ca    9.6      11 Feb 2020 22:22  Phos  3.3     02-11  Mg     2.2     02-11    TPro  7.4  /  Alb  3.8  /  TBili  0.1<L>  /  DBili  x   /  AST  34  /  ALT  18  /  AlkPhos  87  02-11    PT/INR - ( 12 Feb 2020 05:30 )   PT: 11.3 sec;   INR: 0.99 ratio         PTT - ( 12 Feb 2020 05:30 )  PTT:18.8 sec                ABG - ( 11 Feb 2020 22:04 )  pH, Arterial: 7.44  pH, Blood: x     /  pCO2: 37    /  pO2: 124   / HCO3: 24    / Base Excess: .7    /  SaO2: 98        < from: CT Head No Cont (02.11.20 @ 22:34) >  INTERPRETATION:  Asymetric high density involving the right transverse sinus, which is new when compared to prior brain ct 1/12/20. Findings may represent thrombosis and less likely hemorrhage. CTV can be obtained if clinically indicated.    Discussed with Dr. Loving    < end of copied text >            CAPILLARY BLOOD GLUCOSE      POCT Blood Glucose.: 105 mg/dL (12 Feb 2020 06:11)  POCT Blood Glucose.: 146 mg/dL (11 Feb 2020 21:41)  POCT Blood Glucose.: 170 mg/dL (11 Feb 2020 18:34)  POCT Blood Glucose.: 181 mg/dL (11 Feb 2020 13:18)  POCT Blood Glucose.: 142 mg/dL (11 Feb 2020 09:47)          RADIOLOGY & ADDITIONAL TESTS:    < from: CT Head No Cont (02.11.20 @ 22:34) >  FINDINGS:    Similar-appearing thin right lateral convexity subdural collection measuring 3 mm in greatest depth.    No displaced calvarial fracture. No appreciable scalp hematoma.    No midline shift, effacement of basal cisterns, or hydrocephalus. No parenchymal hemorrhage, vasogenic edema, or CT evidence of acute territorial infarct. Mild white matter microvascular ischemic disease.    Polyp/retention cyst in the right sphenoid sinus. Remaining visualized paranasal sinuses and mastoid air cells clear    IMPRESSION:    Similar-appearing thin right lateral convexity subdural collection measuring 3 mm in greatest depth.    No acute parenchymal hemorrhage, mass effect, vasogenic edema, or evidence of acute territorial infarct.         < end of copied text >                  Imaging Personally Reviewed:  [x ] YES  [ ] NO    Consultants involved in case:   Consultant(s) Notes Reviewed:  [x ] YES  [ ] NO:   Care Discussed with Consultants/Other Providers [ x] YES  [ ] NO ***************************************************************  Dr. Kellie Bernal  Internal Medicine   [facility] Pager: [phone]  [facility] Pager: [phone]   ***************************************************************    AFTAB BASS  64y  MRN: [id number]    Subjective:    Patient is a 64y old  Female who presents with a chief complaint of change in mental status (12 Feb 2020 03:27)      Interval history/overnight events:    RRT called at 9:30 PM. Patient altered. Not responding to verbal stimuli but withdrawing from pain in all extremities. LKW was 8:30 PM when patient was a little lethargic. s/p narcan x 2 for possible contribution of depakote. CT head PRELIM read w/ new right transverse sinus thrombosis. CTA head / neck w/ venous phasing ordered per neuro's suggestion. FINAL CT head read as known 3mm SDH.     Patient seen and examined at bedside, seen w/ eyes open on entering room and speaking in short sentences w/ sister. Awake and alert. Patient not opening eyes for exam and resisting eye exam. ROS not assessed due to mental status.         MEDICATIONS  (STANDING):  budesonide  80 MICROgram(s)/formoterol 4.5 MICROgram(s) Inhaler 2 Puff(s) Inhalation two times a day  dextrose 5%. 1000 milliLiter(s) (50 mL/Hr) IV Continuous <Continuous>  dextrose 50% Injectable 12.5 Gram(s) IV Push once  dextrose 50% Injectable 25 Gram(s) IV Push once  dextrose 50% Injectable 25 Gram(s) IV Push once  influenza   Vaccine 0.5 milliLiter(s) IntraMuscular once  insulin glargine Injectable (LANTUS) 34 Unit(s) SubCutaneous every morning  insulin lispro (HumaLOG) corrective regimen sliding scale   SubCutaneous three times a day before meals  insulin lispro (HumaLOG) corrective regimen sliding scale   SubCutaneous at bedtime  insulin lispro Injectable (HumaLOG) 9 Unit(s) SubCutaneous three times a day before meals  levothyroxine Injectable 87.5 MICROGram(s) IV Push once  metoprolol tartrate Injectable 2.5 milliGRAM(s) IV Push every 6 hours  pantoprazole  Injectable 40 milliGRAM(s) IV Push daily  primidone 50 milliGRAM(s) Oral at bedtime  simvastatin 20 milliGRAM(s) Oral at bedtime    MEDICATIONS  (PRN):  acetaminophen   Tablet .. 650 milliGRAM(s) Oral every 6 hours PRN Temp greater or equal to 38C (100.4F), Mild Pain (1 - 3), Moderate Pain (4 - 6)  albuterol/ipratropium for Nebulization 3 milliLiter(s) Nebulizer every 6 hours PRN Shortness of Breath and/or Wheezing  dextrose 40% Gel 15 Gram(s) Oral once PRN Blood Glucose LESS THAN 70 milliGRAM(s)/deciLiter  glucagon  Injectable 1 milliGRAM(s) IntraMuscular once PRN Glucose <70 milliGRAM(s)/deciLiter  haloperidol    Injectable 2 milliGRAM(s) IntraMuscular every 4 hours PRN Agitation  haloperidol    Injectable 2 milliGRAM(s) IV Push every 4 hours PRN Agitation  ketorolac   Injectable 15 milliGRAM(s) IV Push every 6 hours PRN Moderate Pain (4 - 6)  melatonin 3 milliGRAM(s) Oral at bedtime PRN Insomnia  morphine  - Injectable 4 milliGRAM(s) IV Push every 6 hours PRN Moderate Pain (4 - 6)        Objective:    Vitals: Vital Signs Last 24 Hrs  T(C): 37.2 (02-12-20 @ 04:23), Max: 37.6 (02-11-20 @ 13:36)  T(F): 98.9 (02-12-20 @ 04:23), Max: 99.7 (02-11-20 @ 13:36)  HR: 100 (02-12-20 @ 04:23) (78 - 108)  BP: 186/85 (02-12-20 @ 04:23) (112/77 - 186/85)  BP(mean): --  RR: 17 (02-12-20 @ 04:23) (17 - 18)  SpO2: 100% (02-12-20 @ 04:23) (93% - 100%)            I&O's Summary    11 Feb 2020 07:01  -  12 Feb 2020 07:00  --------------------------------------------------------  IN: 360 mL / OUT: 0 mL / NET: 360 mL    PHYSICAL EXAM:  GENERAL: elderly female, agitated and delirious respiring on 3L   HEENT: PERRL, no scleral icterus, no head and neck lad   CHEST/LUNG: CTAB, no wheezing, rhonchi   HEART: RRR, No MGR  ABDOMEN: soft, distended, normoactive, nontender to palpation.   SKIN: skin thickening over lower abdomen with chronic wound underneath pad -- Old anterior abdominal wall wound appears chronic and noninfected.   NERVOUS SYSTEM: Alert & Oriented X 0; no facial asymmetry or facial droop, moving upper extremity bl, but NOT withdrawing from pain bl upper extremity, withdrawing from pain bl LE, resisting eye exam, Brachial and patellar reflexes 1/4 b/l. Babinski downgoing. Nonrigid.   PSYCH: calm       LABS:    02-11    142  |  105  |  20  ----------------------------<  149<H>  5.4<H>   |  20<L>  |  0.85    Ca    9.6      11 Feb 2020 22:22  Phos  3.3     02-11  Mg     2.2     02-11    TPro  7.4  /  Alb  3.8  /  TBili  0.1<L>  /  DBili  x   /  AST  34  /  ALT  18  /  AlkPhos  87  02-11    PT/INR - ( 12 Feb 2020 05:30 )   PT: 11.3 sec;   INR: 0.99 ratio         PTT - ( 12 Feb 2020 05:30 )  PTT:18.8 sec                ABG - ( 11 Feb 2020 22:04 )  pH, Arterial: 7.44  pH, Blood: x     /  pCO2: 37    /  pO2: 124   / HCO3: 24    / Base Excess: .7    /  SaO2: 98        < from: CT Head No Cont (02.11.20 @ 22:34) >  INTERPRETATION:  Asymetric high density involving the right transverse sinus, which is new when compared to prior brain ct 1/12/20. Findings may represent thrombosis and less likely hemorrhage. CTV can be obtained if clinically indicated.    Discussed with Dr. Loving    < end of copied text >            CAPILLARY BLOOD GLUCOSE      POCT Blood Glucose.: 105 mg/dL (12 Feb 2020 06:11)  POCT Blood Glucose.: 146 mg/dL (11 Feb 2020 21:41)  POCT Blood Glucose.: 170 mg/dL (11 Feb 2020 18:34)  POCT Blood Glucose.: 181 mg/dL (11 Feb 2020 13:18)  POCT Blood Glucose.: 142 mg/dL (11 Feb 2020 09:47)          RADIOLOGY & ADDITIONAL TESTS:    < from: CT Head No Cont (02.11.20 @ 22:34) >  FINDINGS:    Similar-appearing thin right lateral convexity subdural collection measuring 3 mm in greatest depth.    No displaced calvarial fracture. No appreciable scalp hematoma.    No midline shift, effacement of basal cisterns, or hydrocephalus. No parenchymal hemorrhage, vasogenic edema, or CT evidence of acute territorial infarct. Mild white matter microvascular ischemic disease.    Polyp/retention cyst in the right sphenoid sinus. Remaining visualized paranasal sinuses and mastoid air cells clear    IMPRESSION:    Similar-appearing thin right lateral convexity subdural collection measuring 3 mm in greatest depth.    No acute parenchymal hemorrhage, mass effect, vasogenic edema, or evidence of acute territorial infarct.         < end of copied text >                  Imaging Personally Reviewed:  [x ] YES  [ ] NO    Consultants involved in case:   Consultant(s) Notes Reviewed:  [x ] YES  [ ] NO:   Care Discussed with Consultants/Other Providers [ x] YES  [ ] NO

## 2020-02-12 NOTE — PROGRESS NOTE ADULT - SUBJECTIVE AND OBJECTIVE BOX
MRN-[id number]  Patient is a 64y old  Female who presents with a chief complaint of change in mental status.    HPI:  63 yo woman with hypothyroidism, borderline DM, HTN, CHRSI on CPAP, rheumatoid arthritis, bed bound at baseline 2/2 complication from ?abdominal surgery in the past admitted to [facility] for metabolic derangements with Neurology consult for agitation and question of autoimmune encephalitis. She was determined to have mxyedema. Levothyroxine dosing was adjusted, TSH now 8.31. Underwent LP  ; CSF studies with only 1 cell in CSF. Underwent MRI  brain revealing for subacute 3mm right holohemispheric subdural hematoma.     Today, she had an abrupt change in mental status. At her baseline, she will say a few words. Today, she was noticed to be lethargic. She underwent CT head which reveal a possible transverse venous sinus thrombosis. Neurology was consulted.    PAST MEDICAL & SURGICAL HISTORY:  Diverticulosis  RA (Rheumatoid Arthritis)  Tooth Abscess  Arthritis  Cigarette Smoker  Chronic Asthma  Adult Hypothyroidism  Borderline Diabetes Mellitus  High Cholesterol  H/O: HTN  Wrist Disorder: S/P Surgery  History of  Section      Home Medications:  Advair Diskus 250 mcg-50 mcg inhalation powder: 1 puff(s) inhaled once a day (2020 21:46)  albuterol 90 mcg/inh inhalation aerosol: 2 puff(s) inhaled 4 times a day (:46)  gabapentin 400 mg oral capsule: 1 cap(s) orally 3 times a day (:46)  Januvia 50 mg oral tablet: 1 tab(s) orally once a day (2020 21:46)  Lantus Solostar Pen 100 units/mL subcutaneous solution: 20 unit(s) subcutaneous once a day (at bedtime) (2020 21:46)  levothyroxine 137 mcg (0.137 mg) oral tablet: 1 tab(s) orally once a day (:46)  metoprolol succinate 50 mg oral tablet, extended release: 1 tab(s) orally once a day (:46)  naproxen 250 mg oral tablet: 1 tab(s) orally 2 times a day, As Needed (:46)  NovoLOG FlexPen 100 units/mL injectable solution: 2-8 unit(s) injectable 3 times a day (with meals)  (2020 21:46)  pantoprazole 40 mg oral delayed release tablet: 1 tab(s) orally once a day (2020 21:46)  predniSONE: 15 milligram(s) orally once a day (2020 21:46)  primidone 50 mg oral tablet: 1 tab(s) orally once a day (at bedtime) (2020 21:46)  sildenafil 20 mg oral tablet: 1 tab(s) orally 3 times a day (2020 21:46)  simvastatin 20 mg oral tablet: 1 tab(s) orally once a day (at bedtime) (2020 21:46)  traMADol 300 mg/24 hours oral tablet, extended release: 1 tab(s) orally once a day, As Needed (2020 21:46)    MEDICATIONS  (STANDING):  budesonide  80 MICROgram(s)/formoterol 4.5 MICROgram(s) Inhaler 2 Puff(s) Inhalation two times a day  dextrose 5%. 1000 milliLiter(s) (50 mL/Hr) IV Continuous <Continuous>  dextrose 50% Injectable 12.5 Gram(s) IV Push once  dextrose 50% Injectable 25 Gram(s) IV Push once  dextrose 50% Injectable 25 Gram(s) IV Push once  diVALproex Sprinkle 250 milliGRAM(s) Oral <User Schedule>  diVALproex Sprinkle 500 milliGRAM(s) Oral <User Schedule>  gabapentin 400 milliGRAM(s) Oral three times a day  influenza   Vaccine 0.5 milliLiter(s) IntraMuscular once  insulin glargine Injectable (LANTUS) 34 Unit(s) SubCutaneous every morning  insulin lispro (HumaLOG) corrective regimen sliding scale   SubCutaneous three times a day before meals  insulin lispro (HumaLOG) corrective regimen sliding scale   SubCutaneous at bedtime  insulin lispro Injectable (HumaLOG) 9 Unit(s) SubCutaneous three times a day before meals  levothyroxine 175 MICROGram(s) Oral daily  metoprolol succinate ER 50 milliGRAM(s) Oral daily  pantoprazole    Tablet 40 milliGRAM(s) Oral before breakfast  predniSONE   Tablet 15 milliGRAM(s) Oral daily  primidone 50 milliGRAM(s) Oral at bedtime  simvastatin 20 milliGRAM(s) Oral at bedtime    MEDICATIONS  (PRN):  acetaminophen   Tablet .. 650 milliGRAM(s) Oral every 6 hours PRN Temp greater or equal to 38C (100.4F), Mild Pain (1 - 3), Moderate Pain (4 - 6)  albuterol/ipratropium for Nebulization 3 milliLiter(s) Nebulizer every 6 hours PRN Shortness of Breath and/or Wheezing  dextrose 40% Gel 15 Gram(s) Oral once PRN Blood Glucose LESS THAN 70 milliGRAM(s)/deciLiter  glucagon  Injectable 1 milliGRAM(s) IntraMuscular once PRN Glucose <70 milliGRAM(s)/deciLiter  haloperidol    Injectable 2 milliGRAM(s) IntraMuscular every 4 hours PRN Agitation  haloperidol    Injectable 2 milliGRAM(s) IV Push every 4 hours PRN Agitation  melatonin 3 milliGRAM(s) Oral at bedtime PRN Insomnia  naproxen 250 milliGRAM(s) Oral every 6 hours PRN Moderate Pain (4 - 6)  traMADol 100 milliGRAM(s) Oral three times a day PRN Severe Pain (7 - 10)    Allergies  No Known Allergies    Intolerances  Seroquel (Other)      REVIEW OF SYSTEMS    ROS: unable to obtain 2/2 mental status    Vital Signs Last 24 Hrs  T(C): 37.6 (2020 21:28), Max: 37.6 (2020 13:36)  T(F): 99.7 (2020 21:28), Max: 99.7 (2020 13:36)  HR: 108 (2020 21:28) (77 - 108)  BP: 146/87 (2020 21:28) (112/77 - 146/87)  BP(mean): --  RR: 18 (2020 21:28) (18 - 18)  SpO2: 93% (2020 21:28) (93% - 98%)    GENERAL EXAM:  Constitutional: lethargic, but opens eyes to noxious stimuli  Gastrointestinal: soft, nontender  Extremities: no edema, no cyanosis  Musculoskeletal: no joint swelling/tenderness, no abnormal movements  Skin: no rashes    NEUROLOGICAL EXAM:  MS: lethargic, but opens eyes to noxious stimuli. no verbal output. does not follow any commands.  CN: blink to threat bilaterally, no facial asymmetry noted.  Motor: Strength: withdraws b/l UE to noxious stimuli. withdraws b/l LE to noxious stimuli, more on left than the right.   Sensation: withdraws b/l LE to noxious stimuli, more on left than the right.     Labs:   cbc  [id number]    142  |  105  |  20  ----------------------------<  149<H>  5.4<H>   |  20<L>  |  0.85    Ca    9.6      2020 22:22  Phos  3.3     02-  Mg     2.2     -    TPro  7.4  /  Alb  3.8  /  TBili  0.1<L>  /  DBili  x   /  AST  34  /  ALT  18  /  AlkPhos  87  -    Coags  [id number] Chol 307<H> <H> HDL 50 Trig 352<H>  [id number] 9.0  - [id number] 9.2    LFTsLIVER FUNCTIONS - ( 2020 22:22 )  Alb: 3.8 g/dL / Pro: 7.4 g/dL / ALK PHOS: 87 U/L / ALT: 18 U/L / AST: 34 U/L / GGT: x             Radiology:  -CT Head: < from: CT Head No Cont (20 @ 22:34) >  INTERPRETATION:  Asymetric high density involving the right transverse sinus, which is new when compared to prior brain ct 20. Findings may represent thrombosis and less likely hemorrhage. CTV can be obtained if clinically indicated.    < end of copied text >

## 2020-02-12 NOTE — PROGRESS NOTE ADULT - PROBLEM SELECTOR PLAN 1
Unclear etiology - possibly from 3mm right sided SDH ided on MRI brain; autoimmune encephalitis 2/2 longstanding RA less likely as MRI brain neg for inflammatory changes, from hypothyroidism less likely given correction of TSH/FT4 with minimal change in behavior vs. possible praneoplastic syndrome, vs other psychiatric or infectious etiology. s/p LP 2/6 w/ sedation; CSF studies w/ 1 nucleated cell; gram stain w/ few PMNs, no orgs; CSF cultures NGTD; CSF PCR, cryptococcal antigen neg, HSV PCR negative. s/p MRI 2/6 brain revealing for subacute 3mm right holohemispheric subdural hematoma   - Now w/ new change in mental status 2/11. CT head revealing for asymmetric high density right transverse sinus, new from CT 1/12 c/f thrombosis   - neurology recs appreciated: CTA head and neck w/ venous phasing, anticoaguable w/u  - rheumatology recs appreciated: less likely rheumatological origin as MRI brain neg for inflammatory changes; serum ALESSIA, ZAFAR, ribosomal P, dsdna neg, NMDRAb neg   - psych recs appreciated: c/w depakote 250 qday, 500 qhs (will change to sprinles as patient refusing PO), Haldol 2mg IV or IM Q4H for agitation, repeat TFTs/LFTs (2/14), holding olanzapine ; no psychiatric CI to discharge   - f/u CSF autoimmune and paraneoplastic panels, VDRL, Borrelia, NMDA R ab   - DVT ppx changed to SCDs given bleed Unclear etiology - possibly from 3mm right sided SDH ided on MRI brain; autoimmune encephalitis 2/2 longstanding RA less likely as MRI brain neg for inflammatory changes, from hypothyroidism less likely given correction of TSH/FT4 with minimal change in behavior vs. possible praneoplastic syndrome, vs other psychiatric or infectious etiology. s/p LP 2/6 w/ sedation; CSF studies w/ 1 nucleated cell; gram stain w/ few PMNs, no orgs; CSF cultures NGTD; CSF PCR, cryptococcal antigen neg, HSV PCR negative. s/p MRI 2/6 brain revealing for subacute 3mm right holohemispheric subdural hematoma   - Now w/ new change in mental status 2/11. CT head prelim read initially c/f transverse process thrombus; finalized read w/o acute change from prior CT   - neurology recs appreciated: f/u CTA head and neck, CTV ordered   - rheumatology recs appreciated: less likely rheumatological origin as MRI brain neg for inflammatory changes; serum ALESSIA, ZAFAR, ribosomal P, dsdna neg, NMDRAb neg   - psych recs appreciated  - depakote d/michael as son Norris has asked to hold further depakote as it has resulted in a change in mental status in his mother priorly  - c/w Haldol 2mg IV or IM Q4H for agitation  - holding Zyprexa, qtc >500   - f/u CSF autoimmune and paraneoplastic panels, VDRL, Borrelia, NMDA R ab   - DVT ppx changed to SCDs given bleed Unclear etiology - possibly from 3mm right sided SDH sided on MRI brain; autoimmune encephalitis 2/2 longstanding RA less likely as MRI brain neg for inflammatory changes, from hypothyroidism less likely given correction of TSH/FT4 with minimal change in behavior vs. possible paraneoplastic syndrome, vs other psychiatric or infectious etiology. s/p LP 2/6 w/ sedation; CSF studies w/ 1 nucleated cell; gram stain w/ few PMNs, no orgs; CSF cultures NGTD; CSF PCR, cryptococcal antigen neg, HSV PCR negative. s/p MRI 2/6 brain revealing for subacute 3mm right holohemispheric subdural hematoma   - Now w/ new change in mental status 2/11. CT head prelim read initially c/f transverse process thrombus; finalized read w/o acute change from prior CT   - neurology recs appreciated:;f/u CTA head and neck, CTV ordered   - rheumatology recs appreciated: less likely rheumatological origin as MRI brain neg for inflammatory changes; serum ALESSIA, ZAFAR, ribosomal P, dsdna neg, NMDRAb neg   - psych recs appreciated  - depakote d/michael as son Norris has asked to hold further depakote as it has resulted in a change in mental status in his mother priorly  - c/w Haldol 2mg IV or IM Q4H for agitation  - holding Zyprexa, qtc >500   - f/u CSF autoimmune and paraneoplastic panels, VDRL, Borrelia, NMDA R ab   - DVT ppx changed to SCDs given bleed

## 2020-02-12 NOTE — PROGRESS NOTE ADULT - ASSESSMENT
65 y/o F w/h/o uncontrolled T2DM with unknown complications. Also h/o functional paraplegia, hypothyroidism, pulmonary hypertension, COPD, RA on chronic prednisone, SBO s/p ex lap with lysis of adhesions c/b multifocal pneumonia. Here with AMS/agitation. Pt still with AMS of unclear etiology and lack of capacity per mental health team. Noted pt had RRT today for tachycardia to the 110s, lethargy and no responding to verbal stimuli. Saw p t earlier today. Pt alert but not talking, moving extremities and per report not taking any POs. Meds changed to IV including synthroid and Prednisone. BG in 100s this am but noted going up to 200s this pm. No hypoglycemia. Gave lower Lantus dose this am (20 units).  Noted premeal insulin since pt is not taking POs at this time. Spoke to team and will continue with basal and Humalog correction scale. Holding premeal since pt still unable to eat.

## 2020-02-12 NOTE — PROGRESS NOTE ADULT - SUBJECTIVE AND OBJECTIVE BOX
DIABETES FOLLOW UP NOTE: Saw pt earlier today  INTERVAL HX: 63 y/o F w/h/o uncontrolled T2DM with unknown complications. Also h/o functional paraplegia, hypothyroidism, pulmonary hypertension, COPD, RA on chronic prednisone, SBO s/p ex lap with lysis of adhesions c/b multifocal pneumonia. Here with AMS/agitation. Pt still with AMS of unclear etiology and lack of capacity per mental health team. Noted pt had RRT today for tachycardia to the 110s, lethargy and no responding to verbal stimuli. Saw p t earlier today. Pt alert but not talking, moving extremities and per report not taking any POs. Meds changed to IV including synthroid and Prednisone. BG in 100s this am but noted going up to 200s this pm. No hypoglycemia. Gave lower Lantus dose this am (20 units).      Review of Systems:  Unable to assess.	    Allergies    Depakote (Other)    Intolerances    Seroquel (Other)    MEDICATIONS:  insulin glargine Injectable (LANTUS) 20 Unit(s) SubCutaneous every morning  insulin lispro (HumaLOG) corrective regimen sliding scale   SubCutaneous three times a day before meals  insulin lispro (HumaLOG) corrective regimen sliding scale   SubCutaneous at bedtime  levothyroxine Injectable 87.5 MICROGram(s) IV Push at bedtime  methylPREDNISolone sodium succinate Injectable 12.5 milliGRAM(s) IV Push daily  simvastatin 20 milliGRAM(s) Oral at bedtime      PHYSICAL EXAM:  VITALS: T(C): 36.7 (02-12-20 @ 14:26)  T(F): 98 (02-12-20 @ 14:26), Max: 99.7 (02-11-20 @ 21:28)  HR: 101 (02-12-20 @ 14:26) (78 - 108)  BP: 159/90 (02-12-20 @ 14:26) (146/87 - 186/85)  RR:  (17 - 18)  SpO2:  (93% - 100%)  Wt(kg): --  GENERAL: Female laying in bed in NAD  Abdomen: Soft, nontender, non distended. obese  Extremities: Warm, no edema in all 4 exts  NEURO: Alert but unable to talk or follow commads    LABS:  POCT Blood Glucose.: 208 mg/dL (02-12-20 @ 13:46)  POCT Blood Glucose.: 202 mg/dL (02-12-20 @ 10:50)  POCT Blood Glucose.: 105 mg/dL (02-12-20 @ 06:11)  POCT Blood Glucose.: 146 mg/dL (02-11-20 @ 21:41)  POCT Blood Glucose.: 170 mg/dL (02-11-20 @ 18:34)  POCT Blood Glucose.: 181 mg/dL (02-11-20 @ 13:18)  POCT Blood Glucose.: 142 mg/dL (02-11-20 @ 09:47)  POCT Blood Glucose.: 149 mg/dL (02-11-20 @ 01:48)  POCT Blood Glucose.: 216 mg/dL (02-10-20 @ 21:51)  POCT Blood Glucose.: 89 mg/dL (02-10-20 @ 14:55)  POCT Blood Glucose.: 69 mg/dL (02-10-20 @ 14:20)  POCT Blood Glucose.: 68 mg/dL (02-10-20 @ 14:18)  POCT Blood Glucose.: 176 mg/dL (02-10-20 @ 08:28)  POCT Blood Glucose.: 74 mg/dL (02-10-20 @ 00:17)  POCT Blood Glucose.: 171 mg/dL (02-09-20 @ 20:00)                            10.5   9.98  )-----------( 379      ( 12 Feb 2020 14:27 )             34.9       02-11    142  |  105  |  20  ----------------------------<  149<H>  5.4<H>   |  20<L>  |  0.85      EGFR if non : 73    Ca    9.6      02-11  Mg     2.2     02-11  Phos  3.3     02-11    TPro  7.4  /  Alb  3.8  /  TBili  0.1<L>  /  DBili  x   /  AST  34  /  ALT  18  /  AlkPhos  87  02-11      Thyroid Function Tests:  02-01 @ 22:37 TSH 8.35 FreeT4 1.3 T3 -- Anti TPO -- Anti Thyroglobulin Ab -- TSI --  01-28 @ 17:35 TSH -- FreeT4 0.7 T3 -- Anti TPO -- Anti Thyroglobulin Ab -- TSI --      Hemoglobin A1C, Whole Blood: 9.0 % <H> [4.0 - 5.6] (01-28-20 @ 19:30)  Hemoglobin A1C, Whole Blood: 9.2 % <H> [4.0 - 5.6] (01-27-20 @ 17:36)      01-27 Chol 307<H> <H> HDL 50 Trig 352<H>

## 2020-02-12 NOTE — PROGRESS NOTE ADULT - PROBLEM SELECTOR PLAN 2
-  on admission; most recent TSH 8.35 2/1  - anti-TPO elevated c/w autoimmune thyroiditis   - levothyroxine 175 mg changed to 87.5 mg IV  - Endocrinology following; repeat TFTs 4-6 weeks; will repeat earlier due to depakote

## 2020-02-12 NOTE — PROGRESS NOTE ADULT - ATTENDING COMMENTS
Patient seen and examined today. I agree with the above findings, assessment, and plan with the following additions and exceptions:     Acute encephalopathy, unspecified.   No clear single etiology at this point in time. Likely multifactorial. Patient with severe hypothyroidism and SDH on imaging. Noted to have signs of AI at outside hospital. Undiagnosed infection is a possibility. Anterior abd wound does not show signs of infection. Patient refusing to provide us with a urine sample however no fever or leukocytosis noted. Continue to monitor off abx for now. Repeat Bcx with ngtd. CSF VDRL, CSF Borrelia and CSF autoimmune encephalitis panel are pending.   2/11 episodes of somnolence may be related to VPA however dose not particularly high given patients documented weight.   Neuro follow up appreciated.  Will touch base with psychiatry with regards to alternative agents for sedation as family adamantly refusing further VPA doses.   Repeat EKG today.   Endo recs appreciated for hypothyroidism and dm.   Extensive discussion had Lesly (Co-HCPs and children) at bedside. I explained that disposition to a subacute rehab or to a long term care facility would be the safest option given the ancillary staff available at these facilities to help manage agitation and her medications. They are in agreement however they would like a facility that does not have limitations to visitation hours. Request communicated to case management.   Rest of plan as above.    Dr. Avery Dugan DO  Attending Physician  Division of Hospital Medicine  [facility]  Pager:  [phone]

## 2020-02-12 NOTE — PROGRESS NOTE ADULT - PROBLEM SELECTOR PLAN 3
Patient w/ anterior abdominal wound from SBO revision   - wound care following; change dressing every 7 days; last 2/6  - patient refusing dressing changes per nursing and exam by physician  - risk of infection discussed with ana maría NEGRETE for s&s of infection w/ son at bedside Patient w/ anterior abdominal wound from SBO revision. Does not appear infected.   - wound care following; change dressing every 7 days; last 2/6  - patient refusing dressing changes per nursing and exam by physician  - risk of infection discussed with ana maría NEGRETE for s&s of infection w/ son at bedside

## 2020-02-12 NOTE — PROGRESS NOTE BEHAVIORAL HEALTH - NSBHCONSULTRECOMMENDOTHER_PSY_A_CORE FT
1) at this time, pt calm, but confused. unclear if pt did not respond well to VPA last night, had   3) pt's confusion likely metabolic in nature  4) pt lacks capacity to refuse treatments, defer to pt's HCP 1) at this time, pt calm, but confused. pt with code rapid response last night, unlikely due to VPA, however consider holding for now, pt not agitated  2) pt's confusion likely metabolic in nature, now ruling out transverse venous thrombosis per neuro recs. If patient does become agitated, as she has been unable to tolerate neuroleptics, with elevated QTc, may consider low dose depakote sprinkles 250 mg po BID PRN only. Family does not want standing depakote at this time.  3) pt lacks capacity to refuse treatments, defer to pt's HCP

## 2020-02-12 NOTE — PROGRESS NOTE ADULT - PROBLEM SELECTOR PLAN 4
-  with slight AG (18), BHB 4 (elevated) on presentation; hospital course complicated by FBGs in 300s; then hypoglycemia likely due to poor   - Per endocrine, c/w lantus 34 + Humalog 9 TID with meals, mod ISS since on steroids -  with slight AG (18), BHB 4 (elevated) on presentation; hospital course complicated by FBGs in 300s; then hypoglycemia likely due to poor   - Endocrine recommendation: lantus 34 + Humalog 9 TID with meals, mod ISS since on steroids  - will c/w lantus 20 as patient w/ lower PO intake due to AMS; holding Humalog 9; c/w mod ISS

## 2020-02-12 NOTE — PROGRESS NOTE ADULT - ASSESSMENT
64F w/ a PMHx arthritis, hypothyroidism, pulmonary HTN, DM on insulin, HTN, COPD/CHRIS on noctournal CPAP and 3L home), RA on chronic prednisone, chronic tremors, SBO s/p ex lap with lysis of adhesions c/b evisceration 2/2 a coughing episode s/p repeat ex lap c/b multifocal pneumonia and hypercapnic respiratory failure requiring intubation 1/6/19, s/p bronch 1/9/19, and extubated on 1/24/19; further c/b Enterococci bacteremia), p/w agitation 3 weeks of unclear etiology, found to be significantly hypothyroid (), extremely combative and paranoid of family and healthcare staff, s/p MRI brain and LP found to have small right sided SDH, course c/b hyperglycemia; now w/ new change in mental status w/ CT head revealing for possible right transverse thrombosis 64F w/ a PMHx arthritis, hypothyroidism, pulmonary HTN, DM on insulin, HTN, COPD/CHRIS on noctournal CPAP and 3L home), RA on chronic prednisone, chronic tremors, SBO s/p ex lap with lysis of adhesions c/b evisceration 2/2 a coughing episode s/p repeat ex lap c/b multifocal pneumonia and hypercapnic respiratory failure requiring intubation 1/6/19, s/p bronch 1/9/19, and extubated on 1/24/19; further c/b Enterococci bacteremia), p/w agitation 3 weeks of unclear etiology, found to be significantly hypothyroid (), extremely combative and paranoid of family and healthcare staff, s/p MRI brain and LP found to have small right sided SDH, course c/b hyperglycemia; now w/ new change in mental status w/ repeat CT head w/o acute changes; CTA head and neck and CTV pending 64F w/ a PMHx arthritis, hypothyroidism, pulmonary HTN, DM on insulin, HTN, COPD/CHRIS on noctournal CPAP and 3L home), RA on chronic prednisone, chronic tremors, SBO s/p ex lap with lysis of adhesions c/b evisceration 2/2 a coughing episode s/p repeat ex lap c/b multifocal pneumonia and hypercapnic respiratory failure requiring intubation 1/6/19, s/p bronch 1/9/19, and extubated on 1/24/19; further c/b Enterococci bacteremia), p/w agitation 3 weeks of unclear etiology, found to be significantly hypothyroid (), extremely combative and paranoid of family and healthcare staff, s/p MRI brain and LP found to have small right sided SDH, course c/b hyperglycemia; now w/ new change in mental status w/ repeat CT head w/o acute changes; CTA head and neck and CTV without any new changes.

## 2020-02-12 NOTE — PROGRESS NOTE ADULT - PROBLEM SELECTOR PLAN 2
-C/w levothyroxine 175 MICROGram(s) Oral daily  -Recheck in 4-6 weeks.  -Staff to try as much as possible to give on an empty stomach  -Plan discussed with pt/team. Pt has IV access and synthroid was changed to IV 87.5 which is 50% of dose. Can consider switching dose to 140mcg IV.   Contact info: [phone] (24/7). pager [phone]

## 2020-02-12 NOTE — PROGRESS NOTE ADULT - PROBLEM SELECTOR PLAN 1
-Test BG ac and hs  -C/w Lantus 20 units q am for now  -Will add Humalog ac meals once pt eating consistently again.  -c/w Humalog moderate correction scale since pt is on Prednisone.  --Pt unable to care for self at this time. Pt will need to continue basal/bolus therapy upon discharge.  -Plan discussed with pt's team and sister and daughter.  Contact info: [phone] (24/7). pager [phone].

## 2020-02-13 DIAGNOSIS — E78.5 HYPERLIPIDEMIA, UNSPECIFIED: ICD-10-CM

## 2020-02-13 LAB
ALBUMIN SERPL ELPH-MCNC: 4.1 G/DL — SIGNIFICANT CHANGE UP (ref 3.3–5)
ALP SERPL-CCNC: 83 U/L — SIGNIFICANT CHANGE UP (ref 40–120)
ALT FLD-CCNC: 18 U/L — SIGNIFICANT CHANGE UP (ref 10–45)
AMMONIA BLD-MCNC: 19 UMOL/L — SIGNIFICANT CHANGE UP (ref 11–55)
AMPHIPHYSIN AB TITR CSF: NEGATIVE TITER — SIGNIFICANT CHANGE UP
ANION GAP SERPL CALC-SCNC: 18 MMOL/L — HIGH (ref 5–17)
AST SERPL-CCNC: 17 U/L — SIGNIFICANT CHANGE UP (ref 10–40)
B BURGDOR DNA SPEC QL NAA+PROBE: NEGATIVE — SIGNIFICANT CHANGE UP
BASOPHILS # BLD AUTO: 0.03 K/UL — SIGNIFICANT CHANGE UP (ref 0–0.2)
BASOPHILS NFR BLD AUTO: 0.3 % — SIGNIFICANT CHANGE UP (ref 0–2)
BILIRUB SERPL-MCNC: 0.1 MG/DL — LOW (ref 0.2–1.2)
BUN SERPL-MCNC: 16 MG/DL — SIGNIFICANT CHANGE UP (ref 7–23)
CALCIUM SERPL-MCNC: 9.9 MG/DL — SIGNIFICANT CHANGE UP (ref 8.4–10.5)
CHLORIDE SERPL-SCNC: 102 MMOL/L — SIGNIFICANT CHANGE UP (ref 96–108)
CO2 SERPL-SCNC: 20 MMOL/L — LOW (ref 22–31)
CREAT SERPL-MCNC: 0.85 MG/DL — SIGNIFICANT CHANGE UP (ref 0.5–1.3)
CV2 IGG TITR CSF: NEGATIVE TITER — SIGNIFICANT CHANGE UP
EOSINOPHIL # BLD AUTO: 0 K/UL — SIGNIFICANT CHANGE UP (ref 0–0.5)
EOSINOPHIL NFR BLD AUTO: 0 % — SIGNIFICANT CHANGE UP (ref 0–6)
GAS PNL BLDA: SIGNIFICANT CHANGE UP
GLIAL NUC TYPE 1 AB TITR CSF: NEGATIVE TITER — SIGNIFICANT CHANGE UP
GLUCOSE BLDC GLUCOMTR-MCNC: 125 MG/DL — HIGH (ref 70–99)
GLUCOSE BLDC GLUCOMTR-MCNC: 172 MG/DL — HIGH (ref 70–99)
GLUCOSE BLDC GLUCOMTR-MCNC: 182 MG/DL — HIGH (ref 70–99)
GLUCOSE BLDC GLUCOMTR-MCNC: 198 MG/DL — HIGH (ref 70–99)
GLUCOSE SERPL-MCNC: 180 MG/DL — HIGH (ref 70–99)
HCT VFR BLD CALC: 33.3 % — LOW (ref 34.5–45)
HGB BLD-MCNC: 10.1 G/DL — LOW (ref 11.5–15.5)
HU1 AB TITR CSF IF: NEGATIVE TITER — SIGNIFICANT CHANGE UP
HU2 AB TITR CSF IF: NEGATIVE TITER — SIGNIFICANT CHANGE UP
HU3 AB TITR CSF: NEGATIVE TITER — SIGNIFICANT CHANGE UP
IMM GRANULOCYTES NFR BLD AUTO: 0.7 % — SIGNIFICANT CHANGE UP (ref 0–1.5)
LYMPHOCYTES # BLD AUTO: 1.26 K/UL — SIGNIFICANT CHANGE UP (ref 1–3.3)
LYMPHOCYTES # BLD AUTO: 12.5 % — LOW (ref 13–44)
MAGNESIUM SERPL-MCNC: 2.4 MG/DL — SIGNIFICANT CHANGE UP (ref 1.6–2.6)
MCHC RBC-ENTMCNC: 30.3 GM/DL — LOW (ref 32–36)
MCHC RBC-ENTMCNC: 30.9 PG — SIGNIFICANT CHANGE UP (ref 27–34)
MCV RBC AUTO: 101.8 FL — HIGH (ref 80–100)
MONOCYTES # BLD AUTO: 0.46 K/UL — SIGNIFICANT CHANGE UP (ref 0–0.9)
MONOCYTES NFR BLD AUTO: 4.6 % — SIGNIFICANT CHANGE UP (ref 2–14)
NEUTROPHILS # BLD AUTO: 8.24 K/UL — HIGH (ref 1.8–7.4)
NEUTROPHILS NFR BLD AUTO: 81.9 % — HIGH (ref 43–77)
NRBC # BLD: 0 /100 WBCS — SIGNIFICANT CHANGE UP (ref 0–0)
PARANEOPLASTIC INTERPRETATION, CSF: SIGNIFICANT CHANGE UP
PCA-TR AB TITR CSF: NEGATIVE TITER — SIGNIFICANT CHANGE UP
PHOSPHATE SERPL-MCNC: 3.1 MG/DL — SIGNIFICANT CHANGE UP (ref 2.5–4.5)
PLATELET # BLD AUTO: 388 K/UL — SIGNIFICANT CHANGE UP (ref 150–400)
POTASSIUM SERPL-MCNC: 4.7 MMOL/L — SIGNIFICANT CHANGE UP (ref 3.5–5.3)
POTASSIUM SERPL-SCNC: 4.7 MMOL/L — SIGNIFICANT CHANGE UP (ref 3.5–5.3)
PROT SERPL-MCNC: 8 G/DL — SIGNIFICANT CHANGE UP (ref 6–8.3)
PURKINJE CELL CYTOPLASMIC AB TYPE 2: NEGATIVE TITER — SIGNIFICANT CHANGE UP
PURKINJE CELLS AB TITR CSF IF: NEGATIVE TITER — SIGNIFICANT CHANGE UP
RBC # BLD: 3.27 M/UL — LOW (ref 3.8–5.2)
RBC # FLD: 14.8 % — HIGH (ref 10.3–14.5)
REFLEX ADDED: SIGNIFICANT CHANGE UP
SODIUM SERPL-SCNC: 140 MMOL/L — SIGNIFICANT CHANGE UP (ref 135–145)
VALPROATE SERPL-MCNC: <3 UG/ML — LOW (ref 50–100)
VDRL CSF-TITR: NEGATIVE — SIGNIFICANT CHANGE UP
WBC # BLD: 10.06 K/UL — SIGNIFICANT CHANGE UP (ref 3.8–10.5)
WBC # FLD AUTO: 10.06 K/UL — SIGNIFICANT CHANGE UP (ref 3.8–10.5)

## 2020-02-13 PROCEDURE — 99232 SBSQ HOSP IP/OBS MODERATE 35: CPT

## 2020-02-13 PROCEDURE — 93010 ELECTROCARDIOGRAM REPORT: CPT

## 2020-02-13 PROCEDURE — 99232 SBSQ HOSP IP/OBS MODERATE 35: CPT | Mod: GC

## 2020-02-13 RX ORDER — RISPERIDONE 4 MG/1
0.25 TABLET ORAL
Refills: 0 | Status: DISCONTINUED | OUTPATIENT
Start: 2020-02-13 | End: 2020-02-15

## 2020-02-13 RX ADMIN — Medication 2: at 07:52

## 2020-02-13 RX ADMIN — Medication 2.5 MILLIGRAM(S): at 06:03

## 2020-02-13 RX ADMIN — PANTOPRAZOLE SODIUM 40 MILLIGRAM(S): 20 TABLET, DELAYED RELEASE ORAL at 12:11

## 2020-02-13 RX ADMIN — Medication 2.5 MILLIGRAM(S): at 01:02

## 2020-02-13 RX ADMIN — Medication 12.5 MILLIGRAM(S): at 06:02

## 2020-02-13 RX ADMIN — INSULIN GLARGINE 20 UNIT(S): 100 INJECTION, SOLUTION SUBCUTANEOUS at 07:52

## 2020-02-13 RX ADMIN — Medication 87.5 MICROGRAM(S): at 22:08

## 2020-02-13 RX ADMIN — Medication 2: at 12:13

## 2020-02-13 RX ADMIN — Medication 2: at 18:05

## 2020-02-13 RX ADMIN — Medication 2.5 MILLIGRAM(S): at 18:08

## 2020-02-13 RX ADMIN — Medication 2.5 MILLIGRAM(S): at 12:07

## 2020-02-13 NOTE — PROGRESS NOTE ADULT - PROBLEM SELECTOR PLAN 4
-  with slight AG (18), BHB 4 (elevated) on presentation; hospital course complicated by FBGs in 300s; then hypoglycemia likely due to poor PO intake given altered mentation   - holding lantus 34 + Humalog 9 TID with meals as patient not currently eating at bl  - will c/w lantus 20 as patient w/ lower PO intake due to AMS; holding and mod ISS  - reintroduce pre-meal coverage as mentation improves -  with slight AG (18), BHB 4 (elevated) on presentation; hospital course complicated by FBGs in 300s; then hypoglycemia likely due to poor PO intake given altered mentation   - holding lantus 34 + Humalog 9 TID with meals as patient not currently eating at bl  - will c/w lantus 20 as patient w/ lower PO intake due to AMS; holding and mod ISS  - FBGs controlled [id number]  - reintroduce pre-meal coverage as mentation improves -  with slight AG (18), BHB 4 (elevated) on presentation; hospital course complicated by FBGs in 300s; then hypoglycemia likely due to poor PO intake given altered mentation   - holding lantus 34 + Humalog 9 TID with meals as patient not currently eating at bl  - will c/w lantus 20 as patient w/ lower PO intake due to AMS; holding and mod ISS  - FBGs controlled   - reintroduce pre-meal coverage as mentation improves

## 2020-02-13 NOTE — PROGRESS NOTE ADULT - ASSESSMENT
64F with hx of DM, hypothyroidism, COPD, RA on chronic steroids who initially presented on 1/25/2020 with AMS, agitation, and psychosis for which neurology has been following. Patient has a small R SDH which has remained stable on repeat imaging and is nonsurgical. Work-up for AMS so far has been unremarkable and has included MRI brain/MRA which showed her known SDH but otherwise showed no acute findings. She is s/p LP and CSF so far unremarkable, including negative encephalitis panel/negative for NMDA receptor antibody. Serum anti-tpo antibody elevated, consistent with known history of autoimmune thyroiditis for which endocrine and rheum are following and do not believe this is related to her encephalopathy. She had an RRT on 2/11 for lethargy/change in mental status and repeat CTH at that time redemonstrated stable small SDH but no new findings. CTA head/neck done on 2/12 show no additional abnormalities. Patient was initially started on depakote for mood stabilization, but this was discontinued due to concern that it was making her lethargic.     On exam, patient is lethargic, non-verbal, not following commands, but withdraws/localizes/screams to noxious stimuli and is non-focal.     Recommendations:  -continued toxic/metabolic/infectious work-up per primary team  -can obtain rEEG especially given recent SDH  -continued psych follow-up 64F with hx of DM, hypothyroidism, COPD, RA on chronic steroids who initially presented on 1/25/2020 with AMS, agitation, and psychosis for which neurology has been following. Patient has a small R SDH which has remained stable on repeat imaging and is nonsurgical. Work-up for AMS so far has been unremarkable and has included MRI brain/MRA which showed her known SDH but otherwise showed no acute findings. She is s/p LP and CSF so far unremarkable, including negative encephalitis panel/negative for NMDA receptor antibody. Serum anti-tpo antibody elevated, consistent with known history of autoimmune thyroiditis for which endocrine and rheum are following and do not believe this is related to her encephalopathy. TSH 17.8, on IV synthroid. Currently receiving IV solumedrol 12.5 mg daily (IV conversion for home steroid dose for RA).  She had an RRT on 2/11 for lethargy/change in mental status and repeat CTH at that time redemonstrated stable small SDH but no new findings. CTA head/neck done on 2/12 show no additional abnormalities. Patient was initially started on depakote for mood stabilization, but this was discontinued due to concern that it was making her lethargic.     On exam, patient is lethargic, non-verbal, not following commands, but withdraws/localizes/screams to noxious stimuli and is non-focal.     Impression: AMS, delirium, etiology currently unclear    Recommendations:  -continued toxic/metabolic/infectious work-up per primary team  -can obtain rEEG especially given recent SDH  -continued psych follow-up

## 2020-02-13 NOTE — PROGRESS NOTE ADULT - SUBJECTIVE AND OBJECTIVE BOX
Patient is a 64y old  Female who presents with a chief complaint of change in mental status.    Subjective: Patient seen and examined. No acute changes, patient continues to appear somnolent.     PAST MEDICAL & SURGICAL HISTORY:  Diverticulosis  RA (Rheumatoid Arthritis)  Tooth Abscess  Arthritis  Cigarette Smoker  Chronic Asthma  Adult Hypothyroidism  Borderline Diabetes Mellitus  High Cholesterol  H/O: HTN  Wrist Disorder: S/P Surgery  History of  Section    MEDICATIONS  (STANDING):  budesonide  80 MICROgram(s)/formoterol 4.5 MICROgram(s) Inhaler 2 Puff(s) Inhalation two times a day  dextrose 5%. 1000 milliLiter(s) (50 mL/Hr) IV Continuous <Continuous>  dextrose 50% Injectable 12.5 Gram(s) IV Push once  dextrose 50% Injectable 25 Gram(s) IV Push once  dextrose 50% Injectable 25 Gram(s) IV Push once  influenza   Vaccine 0.5 milliLiter(s) IntraMuscular once  insulin glargine Injectable (LANTUS) 20 Unit(s) SubCutaneous every morning  insulin lispro (HumaLOG) corrective regimen sliding scale   SubCutaneous three times a day before meals  insulin lispro (HumaLOG) corrective regimen sliding scale   SubCutaneous at bedtime  levothyroxine Injectable 87.5 MICROGram(s) IV Push at bedtime  methylPREDNISolone sodium succinate Injectable 12.5 milliGRAM(s) IV Push daily  metoprolol tartrate Injectable 2.5 milliGRAM(s) IV Push every 6 hours  pantoprazole  Injectable 40 milliGRAM(s) IV Push daily  primidone 50 milliGRAM(s) Oral at bedtime  risperiDONE  Disintegrating Tablet 0.25 milliGRAM(s) Oral two times a day  simvastatin 20 milliGRAM(s) Oral at bedtime    MEDICATIONS  (PRN):  acetaminophen   Tablet .. 650 milliGRAM(s) Oral every 6 hours PRN Temp greater or equal to 38C (100.4F), Mild Pain (1 - 3), Moderate Pain (4 - 6)  albuterol/ipratropium for Nebulization 3 milliLiter(s) Nebulizer every 6 hours PRN Shortness of Breath and/or Wheezing  dextrose 40% Gel 15 Gram(s) Oral once PRN Blood Glucose LESS THAN 70 milliGRAM(s)/deciLiter  glucagon  Injectable 1 milliGRAM(s) IntraMuscular once PRN Glucose <70 milliGRAM(s)/deciLiter  ketorolac   Injectable 15 milliGRAM(s) IV Push every 6 hours PRN Moderate Pain (4 - 6)  melatonin 3 milliGRAM(s) Oral at bedtime PRN Insomnia  morphine  - Injectable 4 milliGRAM(s) IV Push every 6 hours PRN Moderate Pain (4 - 6)    Allergies  Depakote (Other)    Intolerances  Seroquel (Other)      REVIEW OF SYSTEMS    ROS: unable to obtain 2/2 mental status    Vital Signs Last 24 Hrs  T(C): 37.1 (2020 21:55), Max: 37.1 (2020 05:41)  T(F): 98.7 (2020 21:55), Max: 98.8 (2020 11:58)  HR: 89 (2020 21:55) (82 - 102)  BP: 117/44 (2020 21:55) (117/44 - 160/90)  BP(mean): --  RR: 20 (2020 21:55) (18 - 20)  SpO2: 99% (2020 21:55) (97% - 100%)    GENERAL EXAM:  Constitutional: eyes closed, lethargic, but opens eyes to noxious stimuli  NEUROLOGICAL EXAM:  MS: lethargic, but opens eyes to noxious stimuli. screams upon noxious stimuli but otherwise no verbal output. does not follow any commands. has purposeful movement of the upper extremities  CN: blink to threat bilaterally, no facial asymmetry noted. PERRL.   Motor: Strength: withdraws b/l UE to noxious stimuli. withdraws b/l LE to noxious stimuli symmetrically, localizes to pain  Sensation: withdraws b/l LE to noxious stimuli        < from: CT Head No Cont (20 @ 22:34) >  IMPRESSION:    Similar-appearing thin right lateral convexity subdural collection measuring 3 mm in greatest depth.    No acute parenchymal hemorrhage, mass effect, vasogenic edema, or evidence of acute territorial infarct.     < end of copied text >    < from: CT Angio Neck w/ IV Cont (20 @ 09:44) >  IMPRESSION: Right frontal parietal subdural hematoma. No change since 2020. Normal CTA of the head and neck. No evidence of venous thrombosis.      < end of copied text >    < from: MR Head w/ IV Cont (20 @ 14:42) >  IMPRESSION:    Brain MRI:    Right holohemispheric subdural hematoma subacute by signal characteristics measuring 3 mm in thickness and not clearly identified on the patient's prior CT 2020.    No acute/subacute infarct or abnormal parenchymal/leptomeningeal enhancement.    Brain MRA:    No aneurysm, significant stenosis, dissection or occlusion.    Neck MRA: Suggestion of right vertebral origin stenosis with a for visualization of the origin of the right vertebral but a more normal appearing cervical segment of the right vertebral artery      < end of copied text >

## 2020-02-13 NOTE — PROGRESS NOTE ADULT - ASSESSMENT
63 y/o F w/h/o uncontrolled T2DM with unknown complications. Also h/o functional paraplegia, hypothyroidism, pulmonary hypertension, COPD, RA on chronic prednisone, SBO s/p ex lap with lysis of adhesions c/b multifocal pneumonia. Here with AMS/agitation. Pt still with AMS of unclear etiology and lack of capacity per mental health team. Pt less alert today and not taking any POs. Meds were changed to IV including synthroid and Prednisone. BG in 100s. No hypoglycemia.

## 2020-02-13 NOTE — PROGRESS NOTE ADULT - PROBLEM SELECTOR PLAN 2
-  on admission; most recent TSH 8.35 2/1  - anti-TPO elevated c/w autoimmune thyroiditis   - levothyroxine 175 mg changed to 87.5 mg IV  - f/u repeat TSH -  on admission; 8.35 2/1; now 17.8   - anti-TPO elevated c/w autoimmune thyroiditis   - levothyroxine 175 mg changed to 87.5 mg IV  - c/w levothyroxine -  on admission; 8.35 2/1; now 17.8   - anti-TPO elevated c/w autoimmune thyroiditis   - levothyroxine 175 mg changed to 87.5 mg IV; c/w current dose   - repeat TFTs in 4-6 weeks post d/c

## 2020-02-13 NOTE — PROGRESS NOTE ADULT - PROBLEM SELECTOR PLAN 1
-Test BG ac and hs  -C/w Lantus 20 units q am for now  -Will add Humalog ac meals once pt eating consistently again.  -c/w Humalog moderate correction scale since pt is on steroids.  --Pt unable to care for self at this time. Pt will need to continue basal/bolus therapy upon discharge.  -Plan discussed with pt's team and sister and daughter.  Contact info: [phone] (24/7)

## 2020-02-13 NOTE — PROGRESS NOTE BEHAVIORAL HEALTH - NSBHCONSULTRECOMMENDOTHER_PSY_A_CORE FT
1) at this time, pt calm, but confused. pt with code rapid response last night, unlikely due to VPA, however consider holding for now, pt not agitated  2) pt's confusion likely metabolic in nature, now ruling out transverse venous thrombosis per neuro recs. If patient does become agitated, as she has been unable to tolerate neuroleptics, with elevated QTc, may consider low dose depakote sprinkles 250 mg po BID PRN only. Family does not want standing depakote at this time.  3) pt lacks capacity to refuse treatments, defer to pt's HCP

## 2020-02-13 NOTE — PROGRESS NOTE ADULT - PROBLEM SELECTOR PLAN 2
-C/w levothyroxine 175 MICROGram(s) Oral daily  -Recheck in 4-6 weeks.  -Staff to try as much as possible to give on an empty stomach  -Pt has IV access and synthroid was changed to IV 87.5mcg.

## 2020-02-13 NOTE — PROGRESS NOTE ADULT - PROBLEM SELECTOR PLAN 3
Patient w/ anterior abdominal wound from SBO revision. Does not appear infected.   - wound care following; change dressing every 7 days; last 2/6  - wound base clean w/o infection   - CTM for s&s of infection w/ son at bedside Patient w/ anterior abdominal wound from SBO revision. Does not appear infected.   - wound care following; change dressing every 7 days; changed 2/6, 2/13  - wound base clean w/o infection   - CTM for s&s of infection w/ son at bedside

## 2020-02-13 NOTE — PROGRESS NOTE ADULT - ATTENDING COMMENTS
66 y/o woman hx of  DM, hypothyroidism, COPD, RA on steroids, Essential tremor, SBO s/p ex lap with lysis of adhesions (4 retention sutures) c/b eviscerated 2/2 a coughing episode s/p ex lap (6 retention sutures) c/b multifocal pneumonia, developed hypercapnic respiratory failure requiring intubation 1/6/19, s/p bronch 1/9/19, and extubated on 1/24/19, course further c/b Enterococci bacteremia prior to current admission, p/w 3 weeks on psychosis and delusional thinking prior to admission. Pt had LP with unremarkable CSF Feb 6th. NMDA negative. Still pending encephalitis panel results. She has a small right holo cephalic SDH.   Pt currently lethargic, but combative, refuses to answer questions, yells out.   CTA was negative for vessel occlusion, cerebral venous drainage also normal, no thrombosis.    Differential includes delirium, although new onset psychosis in older age is extremely rare it is not impossible, frontotemporal dementia, AIE.   Pt too agitated and does not cooperate with exam, but grossly no focal neurological deficit, no seizures or movement disorders other than tremor which is known. SDH unlikely to be the cause of her AMS.   Pending routine EEG.   can consider starting Depakote 500mg BID for mood stabilization. 66 y/o woman hx of  DM, hypothyroidism, COPD, RA on steroids, Essential tremor, SBO s/p ex lap with lysis of adhesions (4 retention sutures) c/b eviscerated 2/2 a coughing episode s/p ex lap (6 retention sutures) c/b multifocal pneumonia, developed hypercapnic respiratory failure requiring intubation 1/6/19, s/p bronch 1/9/19, and extubated on 1/24/19, course further c/b Enterococci bacteremia prior to current admission, p/w 3 weeks on psychosis and delusional thinking prior to admission. Pt had LP with unremarkable CSF Feb 6th. NMDA negative. Still pending encephalitis panel results. She has a small right holo cephalic SDH.   Pt currently lethargic, but combative, refuses to answer questions, yells out.   CTA was negative for vessel occlusion, cerebral venous drainage also normal, no thrombosis.    Differential includes delirium, although new onset psychosis in older age is extremely rare it is not impossible, frontotemporal dementia, AIE.   Pt too agitated and does not cooperate with exam, but grossly no focal neurological deficit, no seizures or movement disorders other than tremor which is known. SDH unlikely to be the cause of her AMS.   Pending routine EEG.

## 2020-02-13 NOTE — PROGRESS NOTE ADULT - ATTENDING COMMENTS
Patient seen and examined today. I agree with the above findings, assessment, and plan with the following additions and exceptions:     Acute encephalopathy, unspecified.   No clear single etiology at this point in time. Likely multifactorial. Patient with severe hypothyroidism and SDH on imaging. Noted to have signs of AI at outside hospital. Undiagnosed infection is a possibility. Anterior abd wound does not show signs of infection. Patient refusing to provide us with a urine sample however no fever or leukocytosis noted. Continue to monitor off abx for now. Repeat Bcx with ngtd.   Preliminary CSF autoimmune and paraneoplastic panel negative.   2/11 episodes of somnolence may be related to VPA however dose not particularly high given patients documented weight. VPA level low. NH3 low.   Neuro to follow up again today.   QTc <500. Thus we may be able to introduce an antipsychotics. Psych to be notified.   Endo recs appreciated for hypothyroidism and dm.   Extensive discussion had Lesly (Co-HCPs and children) at bedside. I explained that disposition to a subacute rehab or to a long term care facility would be the safest option given the ancillary staff available at these facilities to help manage agitation and her medications. They are in agreement however they would like a facility that does not have limitations to visitation hours. Request communicated to case management.   Rest of plan as above.    Dr. Avery Dugan DO  Attending Physician  Division of Hospital Medicine  [facility]  Pager:  [phone]

## 2020-02-13 NOTE — PROGRESS NOTE ADULT - PROBLEM SELECTOR PLAN 6
- Bcx + for GPR on 1/12, 1 out of 2 bottles   -+Bcx from 1/12 sent to [facility] laboratories, currently no results  - Blood cx obtained on 1/25 no growth X 5 days.  - Patient noted to have leukocytosis, but remains afebrile   - will monitor off abx for now as repeat cultures ngtd

## 2020-02-13 NOTE — PROGRESS NOTE ADULT - SUBJECTIVE AND OBJECTIVE BOX
***************************************************************  Dr. Kellie Bernal  Internal Medicine   [facility] Pager: [phone]  [facility] Pager: [phone]   ***************************************************************    AFTAB BASS  64y  MRN: [id number]    Subjective:    Patient is a 64y old  Female who presents with a chief complaint of change in mental status (12 Feb 2020 17:39)      Interval history/overnight events:    FLORA ON. Mentation improving. EKG repeated 2/12 w/ qtc 456.       MEDICATIONS  (STANDING):  budesonide  80 MICROgram(s)/formoterol 4.5 MICROgram(s) Inhaler 2 Puff(s) Inhalation two times a day  dextrose 5%. 1000 milliLiter(s) (50 mL/Hr) IV Continuous <Continuous>  dextrose 50% Injectable 12.5 Gram(s) IV Push once  dextrose 50% Injectable 25 Gram(s) IV Push once  dextrose 50% Injectable 25 Gram(s) IV Push once  influenza   Vaccine 0.5 milliLiter(s) IntraMuscular once  insulin glargine Injectable (LANTUS) 20 Unit(s) SubCutaneous every morning  insulin lispro (HumaLOG) corrective regimen sliding scale   SubCutaneous three times a day before meals  insulin lispro (HumaLOG) corrective regimen sliding scale   SubCutaneous at bedtime  levothyroxine Injectable 87.5 MICROGram(s) IV Push at bedtime  methylPREDNISolone sodium succinate Injectable 12.5 milliGRAM(s) IV Push daily  metoprolol tartrate Injectable 2.5 milliGRAM(s) IV Push every 6 hours  pantoprazole  Injectable 40 milliGRAM(s) IV Push daily  primidone 50 milliGRAM(s) Oral at bedtime  simvastatin 20 milliGRAM(s) Oral at bedtime    MEDICATIONS  (PRN):  acetaminophen   Tablet .. 650 milliGRAM(s) Oral every 6 hours PRN Temp greater or equal to 38C (100.4F), Mild Pain (1 - 3), Moderate Pain (4 - 6)  albuterol/ipratropium for Nebulization 3 milliLiter(s) Nebulizer every 6 hours PRN Shortness of Breath and/or Wheezing  dextrose 40% Gel 15 Gram(s) Oral once PRN Blood Glucose LESS THAN 70 milliGRAM(s)/deciLiter  glucagon  Injectable 1 milliGRAM(s) IntraMuscular once PRN Glucose <70 milliGRAM(s)/deciLiter  haloperidol    Injectable 2 milliGRAM(s) IntraMuscular every 4 hours PRN Agitation  haloperidol    Injectable 2 milliGRAM(s) IV Push every 4 hours PRN Agitation  ketorolac   Injectable 15 milliGRAM(s) IV Push every 6 hours PRN Moderate Pain (4 - 6)  melatonin 3 milliGRAM(s) Oral at bedtime PRN Insomnia  morphine  - Injectable 4 milliGRAM(s) IV Push every 6 hours PRN Moderate Pain (4 - 6)        Objective:    Vitals: Vital Signs Last 24 Hrs  T(C): 37.1 (02-13-20 @ 05:41), Max: 37.1 (02-13-20 @ 05:41)  T(F): 98.7 (02-13-20 @ 05:41), Max: 98.7 (02-13-20 @ 05:41)  HR: 100 (02-13-20 @ 05:41) (97 - 102)  BP: 148/76 (02-13-20 @ 05:41) (119/62 - 159/90)  BP(mean): --  RR: 18 (02-13-20 @ 05:41) (18 - 18)  SpO2: 97% (02-13-20 @ 05:41) (97% - 99%)            I&O's Summary      LABS:    02-11    142  |  105  |  20  ----------------------------<  149<H>  5.4<H>   |  20<L>  |  0.85    Ca    9.6      11 Feb 2020 22:22  Phos  3.3     02-11  Mg     2.2     02-11    TPro  7.4  /  Alb  3.8  /  TBili  0.1<L>  /  DBili  x   /  AST  34  /  ALT  18  /  AlkPhos  87  02-11    PT/INR - ( 12 Feb 2020 05:30 )   PT: 11.3 sec;   INR: 0.99 ratio         PTT - ( 12 Feb 2020 05:30 )  PTT:18.8 sec                ABG - ( 11 Feb 2020 22:04 )  pH, Arterial: 7.44  pH, Blood: x     /  pCO2: 37    /  pO2: 124   / HCO3: 24    / Base Excess: .7    /  SaO2: 98                                      10.5   9.98  )-----------( 379      ( 12 Feb 2020 14:27 )             34.9     CAPILLARY BLOOD GLUCOSE      POCT Blood Glucose.: 172 mg/dL (13 Feb 2020 07:50)  POCT Blood Glucose.: 140 mg/dL (12 Feb 2020 21:29)  POCT Blood Glucose.: 149 mg/dL (12 Feb 2020 17:45)  POCT Blood Glucose.: 208 mg/dL (12 Feb 2020 13:46)  POCT Blood Glucose.: 202 mg/dL (12 Feb 2020 10:50)          RADIOLOGY & ADDITIONAL TESTS:            Imaging Personally Reviewed:  [ ] YES  [ ] NO    Consultants involved in case:   Consultant(s) Notes Reviewed:  [ ] YES  [ ] NO:   Care Discussed with Consultants/Other Providers [ ] YES  [ ] NO ***************************************************************  Dr. Kellie Bernal  Internal Medicine   [facility] Pager: [phone]  [facility] Pager: [phone]   ***************************************************************    AFTAB BASS  64y  MRN: [id number]    Subjective:    Patient is a 64y old  Female who presents with a chief complaint of change in mental status (12 Feb 2020 17:39)      Interval history/overnight events:    FLORA ON. Mentation improving. EKG repeated 2/12 w/ qtc 456.     This AM, patient awake and alert with eyes open talking to sister at bedside. however appearing more sedated from bl. Sentences spoken are full but noncoherent. Patient resisting eye exam, refusing to open eye look at / talk to physician. ROS not assessed due to altered mentation.       MEDICATIONS  (STANDING):  budesonide  80 MICROgram(s)/formoterol 4.5 MICROgram(s) Inhaler 2 Puff(s) Inhalation two times a day  dextrose 5%. 1000 milliLiter(s) (50 mL/Hr) IV Continuous <Continuous>  dextrose 50% Injectable 12.5 Gram(s) IV Push once  dextrose 50% Injectable 25 Gram(s) IV Push once  dextrose 50% Injectable 25 Gram(s) IV Push once  influenza   Vaccine 0.5 milliLiter(s) IntraMuscular once  insulin glargine Injectable (LANTUS) 20 Unit(s) SubCutaneous every morning  insulin lispro (HumaLOG) corrective regimen sliding scale   SubCutaneous three times a day before meals  insulin lispro (HumaLOG) corrective regimen sliding scale   SubCutaneous at bedtime  levothyroxine Injectable 87.5 MICROGram(s) IV Push at bedtime  methylPREDNISolone sodium succinate Injectable 12.5 milliGRAM(s) IV Push daily  metoprolol tartrate Injectable 2.5 milliGRAM(s) IV Push every 6 hours  pantoprazole  Injectable 40 milliGRAM(s) IV Push daily  primidone 50 milliGRAM(s) Oral at bedtime  simvastatin 20 milliGRAM(s) Oral at bedtime    MEDICATIONS  (PRN):  acetaminophen   Tablet .. 650 milliGRAM(s) Oral every 6 hours PRN Temp greater or equal to 38C (100.4F), Mild Pain (1 - 3), Moderate Pain (4 - 6)  albuterol/ipratropium for Nebulization 3 milliLiter(s) Nebulizer every 6 hours PRN Shortness of Breath and/or Wheezing  dextrose 40% Gel 15 Gram(s) Oral once PRN Blood Glucose LESS THAN 70 milliGRAM(s)/deciLiter  glucagon  Injectable 1 milliGRAM(s) IntraMuscular once PRN Glucose <70 milliGRAM(s)/deciLiter  haloperidol    Injectable 2 milliGRAM(s) IntraMuscular every 4 hours PRN Agitation  haloperidol    Injectable 2 milliGRAM(s) IV Push every 4 hours PRN Agitation  ketorolac   Injectable 15 milliGRAM(s) IV Push every 6 hours PRN Moderate Pain (4 - 6)  melatonin 3 milliGRAM(s) Oral at bedtime PRN Insomnia  morphine  - Injectable 4 milliGRAM(s) IV Push every 6 hours PRN Moderate Pain (4 - 6)        Objective:    Vitals: Vital Signs Last 24 Hrs  T(C): 37.1 (02-13-20 @ 05:41), Max: 37.1 (02-13-20 @ 05:41)  T(F): 98.7 (02-13-20 @ 05:41), Max: 98.7 (02-13-20 @ 05:41)  HR: 100 (02-13-20 @ 05:41) (97 - 102)  BP: 148/76 (02-13-20 @ 05:41) (119/62 - 159/90)  BP(mean): --  RR: 18 (02-13-20 @ 05:41) (18 - 18)  SpO2: 97% (02-13-20 @ 05:41) (97% - 99%)            I&O's Summary    PHYSICAL EXAM:  GENERAL: elderly female in NAD respiring on 3L NC, awake and alert, speaking full noncoherent sentences   HEENT: PERRL, no scleral icterus, no head and neck lad   CHEST/LUNG: CTAB, no wheezing, rhonchi   HEART: RRR, No MGR  ABDOMEN: soft, nondistended, normoactive, nontender to palpation   SKIN: Old anterior abdominal wall wound appears chronic w/o s&s of infection (nonerythematous, no fluctuance or calor)  NERVOUS SYSTEM: Alert & Oriented X 0; no facial asymmetry or facial droop, moving upper extremity bl, resisting eye exam, withdrawing from pain on all extremities. Brachial and patellar reflexes 1/4 b/l. Babinski downgoing. Nonrigid.  Able to resist eye exam.  PSYCH: calm, noncooperative       LABS:    02-11    142  |  105  |  20  ----------------------------<  149<H>  5.4<H>   |  20<L>  |  0.85    Ca    9.6      11 Feb 2020 22:22  Phos  3.3     02-11  Mg     2.2     02-11    TPro  7.4  /  Alb  3.8  /  TBili  0.1<L>  /  DBili  x   /  AST  34  /  ALT  18  /  AlkPhos  87  02-11    PT/INR - ( 12 Feb 2020 05:30 )   PT: 11.3 sec;   INR: 0.99 ratio         PTT - ( 12 Feb 2020 05:30 )  PTT:18.8 sec                ABG - ( 11 Feb 2020 22:04 )  pH, Arterial: 7.44  pH, Blood: x     /  pCO2: 37    /  pO2: 124   / HCO3: 24    / Base Excess: .7    /  SaO2: 98                                      10.5   9.98  )-----------( 379      ( 12 Feb 2020 14:27 )             34.9     CAPILLARY BLOOD GLUCOSE      POCT Blood Glucose.: 172 mg/dL (13 Feb 2020 07:50)  POCT Blood Glucose.: 140 mg/dL (12 Feb 2020 21:29)  POCT Blood Glucose.: 149 mg/dL (12 Feb 2020 17:45)  POCT Blood Glucose.: 208 mg/dL (12 Feb 2020 13:46)  POCT Blood Glucose.: 202 mg/dL (12 Feb 2020 10:50)          RADIOLOGY & ADDITIONAL TESTS:            Imaging Personally Reviewed:  [x ] YES  [ ] NO    Consultants involved in case:   Consultant(s) Notes Reviewed:  [x ] YES  [ ] NO:   Care Discussed with Consultants/Other Providers [x ] YES  [ ] NO ***************************************************************  Dr. Kellie Bernal  Internal Medicine   [facility] Pager: [phone]  [facility] Pager: [phone]   ***************************************************************    AFTAB BASS  64y  MRN: [id number]    Subjective:    Patient is a 64y old  Female who presents with a chief complaint of change in mental status (12 Feb 2020 17:39)      Interval history/overnight events:    FLORA ON. Mentation improving. EKG repeated 2/12 w/ qtc 456.     This AM, patient awake and alert with eyes open talking to sister at bedside.  Sentences spoken are full but noncoherent. Patient resisting eye exam, refusing to open eye look at / talk to physician. ROS not assessed due to altered mentation.       MEDICATIONS  (STANDING):  budesonide  80 MICROgram(s)/formoterol 4.5 MICROgram(s) Inhaler 2 Puff(s) Inhalation two times a day  dextrose 5%. 1000 milliLiter(s) (50 mL/Hr) IV Continuous <Continuous>  dextrose 50% Injectable 12.5 Gram(s) IV Push once  dextrose 50% Injectable 25 Gram(s) IV Push once  dextrose 50% Injectable 25 Gram(s) IV Push once  influenza   Vaccine 0.5 milliLiter(s) IntraMuscular once  insulin glargine Injectable (LANTUS) 20 Unit(s) SubCutaneous every morning  insulin lispro (HumaLOG) corrective regimen sliding scale   SubCutaneous three times a day before meals  insulin lispro (HumaLOG) corrective regimen sliding scale   SubCutaneous at bedtime  levothyroxine Injectable 87.5 MICROGram(s) IV Push at bedtime  methylPREDNISolone sodium succinate Injectable 12.5 milliGRAM(s) IV Push daily  metoprolol tartrate Injectable 2.5 milliGRAM(s) IV Push every 6 hours  pantoprazole  Injectable 40 milliGRAM(s) IV Push daily  primidone 50 milliGRAM(s) Oral at bedtime  simvastatin 20 milliGRAM(s) Oral at bedtime    MEDICATIONS  (PRN):  acetaminophen   Tablet .. 650 milliGRAM(s) Oral every 6 hours PRN Temp greater or equal to 38C (100.4F), Mild Pain (1 - 3), Moderate Pain (4 - 6)  albuterol/ipratropium for Nebulization 3 milliLiter(s) Nebulizer every 6 hours PRN Shortness of Breath and/or Wheezing  dextrose 40% Gel 15 Gram(s) Oral once PRN Blood Glucose LESS THAN 70 milliGRAM(s)/deciLiter  glucagon  Injectable 1 milliGRAM(s) IntraMuscular once PRN Glucose <70 milliGRAM(s)/deciLiter  haloperidol    Injectable 2 milliGRAM(s) IntraMuscular every 4 hours PRN Agitation  haloperidol    Injectable 2 milliGRAM(s) IV Push every 4 hours PRN Agitation  ketorolac   Injectable 15 milliGRAM(s) IV Push every 6 hours PRN Moderate Pain (4 - 6)  melatonin 3 milliGRAM(s) Oral at bedtime PRN Insomnia  morphine  - Injectable 4 milliGRAM(s) IV Push every 6 hours PRN Moderate Pain (4 - 6)        Objective:    Vitals: Vital Signs Last 24 Hrs  T(C): 37.1 (02-13-20 @ 05:41), Max: 37.1 (02-13-20 @ 05:41)  T(F): 98.7 (02-13-20 @ 05:41), Max: 98.7 (02-13-20 @ 05:41)  HR: 100 (02-13-20 @ 05:41) (97 - 102)  BP: 148/76 (02-13-20 @ 05:41) (119/62 - 159/90)  BP(mean): --  RR: 18 (02-13-20 @ 05:41) (18 - 18)  SpO2: 97% (02-13-20 @ 05:41) (97% - 99%)            I&O's Summary    PHYSICAL EXAM:  GENERAL: elderly female in NAD respiring on 3L NC, awake and alert, speaking full noncoherent sentences   HEENT: PERRL, no scleral icterus, no head and neck lad   CHEST/LUNG: CTAB, no wheezing, rhonchi   HEART: RRR, No MGR  ABDOMEN: soft, nondistended, normoactive, nontender to palpation   SKIN: Old anterior abdominal wall wound appears chronic w/o s&s of infection (nonerythematous, no fluctuance)  NERVOUS SYSTEM: Alert & Oriented X 0; PERRL. no facial asymmetry or facial droop, moving upper extremity bl, resisting eye exam, withdrawing from pain on all extremities. Brachial and patellar reflexes 1/4 b/l. Babinski downgoing. Nonrigid. Able to resist eye exam.  PSYCH: calm, noncooperative       LABS:    02-11    142  |  105  |  20  ----------------------------<  149<H>  5.4<H>   |  20<L>  |  0.85    Ca    9.6      11 Feb 2020 22:22  Phos  3.3     02-11  Mg     2.2     02-11    TPro  7.4  /  Alb  3.8  /  TBili  0.1<L>  /  DBili  x   /  AST  34  /  ALT  18  /  AlkPhos  87  02-11    PT/INR - ( 12 Feb 2020 05:30 )   PT: 11.3 sec;   INR: 0.99 ratio         PTT - ( 12 Feb 2020 05:30 )  PTT:18.8 sec                ABG - ( 11 Feb 2020 22:04 )  pH, Arterial: 7.44  pH, Blood: x     /  pCO2: 37    /  pO2: 124   / HCO3: 24    / Base Excess: .7    /  SaO2: 98                                      10.5   9.98  )-----------( 379      ( 12 Feb 2020 14:27 )             34.9     CAPILLARY BLOOD GLUCOSE      POCT Blood Glucose.: 172 mg/dL (13 Feb 2020 07:50)  POCT Blood Glucose.: 140 mg/dL (12 Feb 2020 21:29)  POCT Blood Glucose.: 149 mg/dL (12 Feb 2020 17:45)  POCT Blood Glucose.: 208 mg/dL (12 Feb 2020 13:46)  POCT Blood Glucose.: 202 mg/dL (12 Feb 2020 10:50)          RADIOLOGY & ADDITIONAL TESTS:            Imaging Personally Reviewed:  [x ] YES  [ ] NO    Consultants involved in case:   Consultant(s) Notes Reviewed:  [x ] YES  [ ] NO:   Care Discussed with Consultants/Other Providers [x ] YES  [ ] NO ***************************************************************  Dr. Kellie Bernal  Internal Medicine   [facility] Pager: [phone]  [facility] Pager: [phone]   ***************************************************************    AFTAB BASS  64y  MRN: [id number]    Subjective:    Patient is a 64y old  Female who presents with a chief complaint of change in mental status (12 Feb 2020 17:39)      Interval history/overnight events:    FLORA ON. Mentation improving. EKG repeated 2/12 w/ qtc 452.     This AM, patient awake and alert with eyes open talking to sister at bedside.  Sentences spoken are full but noncoherent. Patient resisting eye exam, refusing to open eye look at / talk to physician. ROS not assessed due to altered mentation.       MEDICATIONS  (STANDING):  budesonide  80 MICROgram(s)/formoterol 4.5 MICROgram(s) Inhaler 2 Puff(s) Inhalation two times a day  dextrose 5%. 1000 milliLiter(s) (50 mL/Hr) IV Continuous <Continuous>  dextrose 50% Injectable 12.5 Gram(s) IV Push once  dextrose 50% Injectable 25 Gram(s) IV Push once  dextrose 50% Injectable 25 Gram(s) IV Push once  influenza   Vaccine 0.5 milliLiter(s) IntraMuscular once  insulin glargine Injectable (LANTUS) 20 Unit(s) SubCutaneous every morning  insulin lispro (HumaLOG) corrective regimen sliding scale   SubCutaneous three times a day before meals  insulin lispro (HumaLOG) corrective regimen sliding scale   SubCutaneous at bedtime  levothyroxine Injectable 87.5 MICROGram(s) IV Push at bedtime  methylPREDNISolone sodium succinate Injectable 12.5 milliGRAM(s) IV Push daily  metoprolol tartrate Injectable 2.5 milliGRAM(s) IV Push every 6 hours  pantoprazole  Injectable 40 milliGRAM(s) IV Push daily  primidone 50 milliGRAM(s) Oral at bedtime  simvastatin 20 milliGRAM(s) Oral at bedtime    MEDICATIONS  (PRN):  acetaminophen   Tablet .. 650 milliGRAM(s) Oral every 6 hours PRN Temp greater or equal to 38C (100.4F), Mild Pain (1 - 3), Moderate Pain (4 - 6)  albuterol/ipratropium for Nebulization 3 milliLiter(s) Nebulizer every 6 hours PRN Shortness of Breath and/or Wheezing  dextrose 40% Gel 15 Gram(s) Oral once PRN Blood Glucose LESS THAN 70 milliGRAM(s)/deciLiter  glucagon  Injectable 1 milliGRAM(s) IntraMuscular once PRN Glucose <70 milliGRAM(s)/deciLiter  haloperidol    Injectable 2 milliGRAM(s) IntraMuscular every 4 hours PRN Agitation  haloperidol    Injectable 2 milliGRAM(s) IV Push every 4 hours PRN Agitation  ketorolac   Injectable 15 milliGRAM(s) IV Push every 6 hours PRN Moderate Pain (4 - 6)  melatonin 3 milliGRAM(s) Oral at bedtime PRN Insomnia  morphine  - Injectable 4 milliGRAM(s) IV Push every 6 hours PRN Moderate Pain (4 - 6)        Objective:    Vitals: Vital Signs Last 24 Hrs  T(C): 37.1 (02-13-20 @ 05:41), Max: 37.1 (02-13-20 @ 05:41)  T(F): 98.7 (02-13-20 @ 05:41), Max: 98.7 (02-13-20 @ 05:41)  HR: 100 (02-13-20 @ 05:41) (97 - 102)  BP: 148/76 (02-13-20 @ 05:41) (119/62 - 159/90)  BP(mean): --  RR: 18 (02-13-20 @ 05:41) (18 - 18)  SpO2: 97% (02-13-20 @ 05:41) (97% - 99%)            I&O's Summary    PHYSICAL EXAM:  GENERAL: elderly female in NAD respiring on 3L NC, awake and alert, speaking full noncoherent sentences   HEENT: PERRL, no scleral icterus, no head and neck lad   CHEST/LUNG: CTAB, no wheezing, rhonchi   HEART: RRR, No MGR  ABDOMEN: soft, nondistended, normoactive, nontender to palpation   SKIN: Old anterior abdominal wall wound appears chronic w/o s&s of infection (nonerythematous, no fluctuance)  NERVOUS SYSTEM: Alert & Oriented X 0; PERRL. no facial asymmetry or facial droop, moving upper extremity bl, resisting eye exam, withdrawing from pain on all extremities. Brachial and patellar reflexes 1/4 b/l. Babinski downgoing. Nonrigid. Able to resist eye exam.  PSYCH: calm, noncooperative       LABS:    02-11    142  |  105  |  20  ----------------------------<  149<H>  5.4<H>   |  20<L>  |  0.85    Ca    9.6      11 Feb 2020 22:22  Phos  3.3     02-11  Mg     2.2     02-11    TPro  7.4  /  Alb  3.8  /  TBili  0.1<L>  /  DBili  x   /  AST  34  /  ALT  18  /  AlkPhos  87  02-11    PT/INR - ( 12 Feb 2020 05:30 )   PT: 11.3 sec;   INR: 0.99 ratio         PTT - ( 12 Feb 2020 05:30 )  PTT:18.8 sec                ABG - ( 11 Feb 2020 22:04 )  pH, Arterial: 7.44  pH, Blood: x     /  pCO2: 37    /  pO2: 124   / HCO3: 24    / Base Excess: .7    /  SaO2: 98                                      10.5   9.98  )-----------( 379      ( 12 Feb 2020 14:27 )             34.9     CAPILLARY BLOOD GLUCOSE      POCT Blood Glucose.: 172 mg/dL (13 Feb 2020 07:50)  POCT Blood Glucose.: 140 mg/dL (12 Feb 2020 21:29)  POCT Blood Glucose.: 149 mg/dL (12 Feb 2020 17:45)  POCT Blood Glucose.: 208 mg/dL (12 Feb 2020 13:46)  POCT Blood Glucose.: 202 mg/dL (12 Feb 2020 10:50)          RADIOLOGY & ADDITIONAL TESTS:            Imaging Personally Reviewed:  [x ] YES  [ ] NO    Consultants involved in case:   Consultant(s) Notes Reviewed:  [x ] YES  [ ] NO:   Care Discussed with Consultants/Other Providers [x ] YES  [ ] NO

## 2020-02-13 NOTE — PROGRESS NOTE ADULT - PROBLEM SELECTOR PLAN 1
Unclear etiology - possibly from 3mm right sided SDH sided on MRI brain; autoimmune encephalitis 2/2 longstanding RA less likely as MRI brain neg for inflammatory changes, from hypothyroidism less likely given correction of TSH/FT4 with minimal change in behavior vs. possible paraneoplastic syndrome, vs other psychiatric or infectious etiology. s/p LP 2/6 w/ sedation; CSF studies w/ 1 nucleated cell; gram stain w/ few PMNs, no orgs; CSF cultures NGTD; CSF PCR, cryptococcal antigen, HSV PCR, AF smear, and Borrelia neg. s/p MRI 2/6 brain revealing for subacute 3mm right holohemispheric subdural hematoma   - Now w/ new change in mental status 2/11, CTA head and neck w/out acute changes, CTV w/o thrombosis; possibly related to depakote sensitivity; depakote d/michael as son Norris has asked to hold further depakote as it has resulted in a change in mental status in his mother in the past lasting a few days and requiring hospitalization  - neurology recs appreciated: pending   - psych recs appreciated: pending   - rheumatology recs appreciated: less likely rheumatological origin as MRI brain neg for inflammatory changes; serum ALESSIA, ZAFAR, ribosomal P, dsdna neg, NMDRAb neg   - c/w Haldol 2mg IV or IM Q4H for agitation  - likely can consider zyprexa as qtc <500   - f/u CSF autoimmune and paraneoplastic panels, VDRL, Borrelia, NMDA R ab   - DVT ppx changed to SCDs given bleed Unclear etiology - possibly from 3mm right sided SDH sided on MRI brain; autoimmune encephalitis 2/2 longstanding RA less likely as MRI brain neg for inflammatory changes, from hypothyroidism less likely given correction of TSH/FT4 with minimal change in behavior vs. possible paraneoplastic syndrome, vs other psychiatric or infectious etiology. s/p LP 2/6 w/ sedation; CSF studies w/ 1 nucleated cell; gram stain w/ few PMNs, no orgs; CSF cultures NGTD; CSF PCR, cryptococcal antigen, HSV PCR, AF smear, and Borrelia neg. s/p MRI 2/6 brain revealing for subacute 3mm right holohemispheric subdural hematoma   - Now w/ new change in mental status 2/11, CTA head and neck w/out acute changes, CTV w/o thrombosis; possibly related to depakote sensitivity; depakote d/michael as son Norris has asked to hold further depakote as it has resulted in a change in mental status in his mother in the past lasting a few days and requiring hospitalization  - neurology recs appreciated: pending   - psych recs appreciated: pending   - rheumatology recs appreciated: less likely rheumatological origin as MRI brain neg for inflammatory changes; serum ALESSIA, ZAFAR, ribosomal P, dsdna neg, NMDRAb neg   - c/w Haldol 2mg IV or IM Q4H for agitation  - likely can consider zyprexa as qtc <500; repeat 456   - f/u CSF autoimmune and paraneoplastic panels, VDRL, Borrelia, NMDA R ab   - DVT ppx changed to SCDs given bleed Unclear etiology - possibly from 3mm right sided SDH sided on MRI brain; autoimmune encephalitis 2/2 longstanding RA less likely as MRI brain neg for inflammatory changes, from hypothyroidism less likely given correction of TSH/FT4 with minimal change in behavior vs. possible paraneoplastic syndrome, vs other psychiatric or infectious etiology. s/p LP 2/6 w/ sedation; CSF studies w/ 1 nucleated cell; gram stain w/ few PMNs, no orgs; CSF cultures NGTD; CSF PCR, cryptococcal antigen, HSV PCR, AFB smear, Borrelia ab, VDRL neg. s/p MRI 2/6 brain revealing for subacute 3mm right holohemispheric subdural hematoma   - Now w/ new change in mental status, increasing sedation 2/11. CTA head and neck w/out acute changes, CTV w/o thrombosis; possibly related to depakote sensitivity; depakote d/michael as son Norris has asked to hold further depakote as it has resulted in a change in mental status in his mother in the past lasting a few days and requiring hospitalization  - Paraneoplastic panel, NMDR-Ab CSF, autoimmune panel ([place] IgM, IgG, dsDNA, anti-RNP, anti-ANF, anti-smith Ab neg)   - neurology f/u requested; recs pending   - psych f/u requested; pending   - rheumatology recs appreciated: less likely rheumatological origin as MRI brain neg for inflammatory changes; serum ALESSIA, ZAFAR, ribosomal P, dsdna neg, NMDRAb neg   - c/w Haldol 2mg IV or IM Q4H for agitation  - likely can consider zyprexa as qtc <500; repeat 456   - DVT ppx changed to SCDs given bleed Unclear etiology - possibly from 3mm right sided SDH sided on MRI brain; autoimmune encephalitis 2/2 longstanding RA less likely as MRI brain neg for inflammatory changes, from hypothyroidism less likely given correction of TSH/FT4 with minimal change in behavior vs. possible paraneoplastic syndrome, vs other psychiatric or infectious etiology. s/p LP 2/6 w/ sedation; CSF studies w/ 1 nucleated cell; gram stain w/ few PMNs, no orgs; CSF cultures NGTD; CSF PCR, cryptococcal antigen, HSV PCR, AFB smear, Borrelia ab, VDRL neg. s/p MRI 2/6 brain revealing for subacute 3mm right holohemispheric subdural hematoma   - Now w/ new change in mental status, increasing sedation 2/11. CTA head and neck w/out acute changes, CTV w/o thrombosis; possibly related to depakote sensitivity; depakote d/michael as son Norris has asked to hold further depakote as it has resulted in a change in mental status in his mother in the past lasting a few days and requiring hospitalization  - Paraneoplastic panel, NMDR-Ab CSF, autoimmune panel ([place] IgM, IgG, dsDNA, anti-RNP, anti-ANF, anti-smith Ab neg)   - qtc 452  - neurology f/u requested; recs pending   - psych f/u requested: as per psych attending, risperidone 0.25mg BID, consider EEG   - rheumatology recs appreciated: less likely rheumatological origin as MRI brain neg for inflammatory changes; serum ALESSIA, ZAFAR, ribosomal P, dsdna neg, NMDRAb neg   - DVT ppx changed to SCDs given bleed Unclear etiology - possibly from 3mm right sided SDH sided on MRI brain; autoimmune encephalitis 2/2 longstanding RA less likely as MRI brain neg for inflammatory changes, from hypothyroidism less likely given correction of TSH/FT4 with minimal change in behavior vs. possible paraneoplastic syndrome, vs other psychiatric or infectious etiology. s/p LP 2/6 w/ sedation; CSF studies w/ 1 nucleated cell; gram stain w/ few PMNs, no orgs; CSF cultures NGTD; CSF PCR, cryptococcal antigen, HSV PCR, AFB smear, Borrelia ab, VDRL neg. s/p MRI 2/6 brain revealing for subacute 3mm right holohemispheric subdural hematoma   - Now w/ new change in mental status, increasing sedation 2/11. CTA head and neck w/out acute changes, CTV w/o thrombosis; possibly related to depakote sensitivity; depakote d/michael as son Norris has asked to hold further depakote as it has resulted in a change in mental status in his mother in the past lasting a few days and requiring hospitalization  - Paraneoplastic panel, NMDR-Ab CSF, autoimmune panel ([place] IgM, IgG, dsDNA, anti-RNP, anti-ANF, anti-smith Ab neg)   - qtc 452  - neurology f/u requested; recs pending   - psych f/u requested: as per psych attending, risperidone 0.25mg BID (ordered as disintegrating tablet), consider EEG   - rheumatology recs appreciated: less likely rheumatological origin as MRI brain neg for inflammatory changes; serum ALESSIA, ZAFAR, ribosomal P, dsdna neg, NMDRAb neg   - DVT ppx changed to SCDs given bleed

## 2020-02-14 LAB
ALBUMIN SERPL ELPH-MCNC: 4.2 G/DL — SIGNIFICANT CHANGE UP (ref 3.3–5)
ALP SERPL-CCNC: 74 U/L — SIGNIFICANT CHANGE UP (ref 40–120)
ALT FLD-CCNC: 15 U/L — SIGNIFICANT CHANGE UP (ref 10–45)
ANION GAP SERPL CALC-SCNC: 19 MMOL/L — HIGH (ref 5–17)
APPEARANCE UR: CLEAR — SIGNIFICANT CHANGE UP
AST SERPL-CCNC: 11 U/L — SIGNIFICANT CHANGE UP (ref 10–40)
BACTERIA # UR AUTO: ABNORMAL
BASE EXCESS BLDV CALC-SCNC: -1.2 MMOL/L — SIGNIFICANT CHANGE UP (ref -2–2)
BASOPHILS # BLD AUTO: 0.05 K/UL — SIGNIFICANT CHANGE UP (ref 0–0.2)
BASOPHILS NFR BLD AUTO: 0.5 % — SIGNIFICANT CHANGE UP (ref 0–2)
BILIRUB SERPL-MCNC: 0.2 MG/DL — SIGNIFICANT CHANGE UP (ref 0.2–1.2)
BILIRUB UR-MCNC: NEGATIVE — SIGNIFICANT CHANGE UP
BUN SERPL-MCNC: 17 MG/DL — SIGNIFICANT CHANGE UP (ref 7–23)
CA-I SERPL-SCNC: 1.23 MMOL/L — SIGNIFICANT CHANGE UP (ref 1.12–1.3)
CALCIUM SERPL-MCNC: 9.5 MG/DL — SIGNIFICANT CHANGE UP (ref 8.4–10.5)
CHLORIDE BLDV-SCNC: 111 MMOL/L — HIGH (ref 96–108)
CHLORIDE SERPL-SCNC: 107 MMOL/L — SIGNIFICANT CHANGE UP (ref 96–108)
CO2 BLDV-SCNC: 25 MMOL/L — SIGNIFICANT CHANGE UP (ref 22–30)
CO2 SERPL-SCNC: 22 MMOL/L — SIGNIFICANT CHANGE UP (ref 22–31)
COLOR SPEC: YELLOW — SIGNIFICANT CHANGE UP
CREAT SERPL-MCNC: 0.86 MG/DL — SIGNIFICANT CHANGE UP (ref 0.5–1.3)
DIFF PNL FLD: NEGATIVE — SIGNIFICANT CHANGE UP
EOSINOPHIL # BLD AUTO: 0 K/UL — SIGNIFICANT CHANGE UP (ref 0–0.5)
EOSINOPHIL NFR BLD AUTO: 0 % — SIGNIFICANT CHANGE UP (ref 0–6)
EPI CELLS # UR: 10 /HPF — HIGH
GAS PNL BLDV: 147 MMOL/L — HIGH (ref 135–145)
GAS PNL BLDV: SIGNIFICANT CHANGE UP
GAS PNL BLDV: SIGNIFICANT CHANGE UP
GLUCOSE BLDC GLUCOMTR-MCNC: 119 MG/DL — HIGH (ref 70–99)
GLUCOSE BLDC GLUCOMTR-MCNC: 124 MG/DL — HIGH (ref 70–99)
GLUCOSE BLDC GLUCOMTR-MCNC: 172 MG/DL — HIGH (ref 70–99)
GLUCOSE BLDC GLUCOMTR-MCNC: 79 MG/DL — SIGNIFICANT CHANGE UP (ref 70–99)
GLUCOSE BLDV-MCNC: 129 MG/DL — HIGH (ref 70–99)
GLUCOSE SERPL-MCNC: 123 MG/DL — HIGH (ref 70–99)
GLUCOSE UR QL: NEGATIVE — SIGNIFICANT CHANGE UP
HCO3 BLDV-SCNC: 24 MMOL/L — SIGNIFICANT CHANGE UP (ref 21–29)
HCT VFR BLD CALC: 30.8 % — LOW (ref 34.5–45)
HCT VFR BLDA CALC: 30 % — LOW (ref 39–50)
HGB BLD CALC-MCNC: 9.9 G/DL — LOW (ref 11.5–15.5)
HGB BLD-MCNC: 9.5 G/DL — LOW (ref 11.5–15.5)
HOROWITZ INDEX BLDV+IHG-RTO: SIGNIFICANT CHANGE UP
HYALINE CASTS # UR AUTO: 3 /LPF — HIGH (ref 0–2)
IMM GRANULOCYTES NFR BLD AUTO: 0.3 % — SIGNIFICANT CHANGE UP (ref 0–1.5)
KETONES UR-MCNC: ABNORMAL
LACTATE BLDV-MCNC: 1.5 MMOL/L — SIGNIFICANT CHANGE UP (ref 0.7–2)
LEUKOCYTE ESTERASE UR-ACNC: ABNORMAL
LYMPHOCYTES # BLD AUTO: 2.07 K/UL — SIGNIFICANT CHANGE UP (ref 1–3.3)
LYMPHOCYTES # BLD AUTO: 20.9 % — SIGNIFICANT CHANGE UP (ref 13–44)
MAGNESIUM SERPL-MCNC: 2.2 MG/DL — SIGNIFICANT CHANGE UP (ref 1.6–2.6)
MCHC RBC-ENTMCNC: 30.8 GM/DL — LOW (ref 32–36)
MCHC RBC-ENTMCNC: 31.4 PG — SIGNIFICANT CHANGE UP (ref 27–34)
MCV RBC AUTO: 101.7 FL — HIGH (ref 80–100)
MONOCYTES # BLD AUTO: 0.69 K/UL — SIGNIFICANT CHANGE UP (ref 0–0.9)
MONOCYTES NFR BLD AUTO: 7 % — SIGNIFICANT CHANGE UP (ref 2–14)
NEUTROPHILS # BLD AUTO: 7.07 K/UL — SIGNIFICANT CHANGE UP (ref 1.8–7.4)
NEUTROPHILS NFR BLD AUTO: 71.3 % — SIGNIFICANT CHANGE UP (ref 43–77)
NITRITE UR-MCNC: POSITIVE
NRBC # BLD: 0 /100 WBCS — SIGNIFICANT CHANGE UP (ref 0–0)
OTHER CELLS CSF MANUAL: 11 ML/DL — LOW (ref 18–22)
PCO2 BLDV: 42 MMHG — SIGNIFICANT CHANGE UP (ref 35–50)
PH BLDV: 7.36 — SIGNIFICANT CHANGE UP (ref 7.35–7.45)
PH UR: 6 — SIGNIFICANT CHANGE UP (ref 5–8)
PHOSPHATE SERPL-MCNC: 2.8 MG/DL — SIGNIFICANT CHANGE UP (ref 2.5–4.5)
PLATELET # BLD AUTO: 404 K/UL — HIGH (ref 150–400)
PO2 BLDV: 54 MMHG — HIGH (ref 25–45)
POTASSIUM BLDV-SCNC: 4 MMOL/L — SIGNIFICANT CHANGE UP (ref 3.5–5.3)
POTASSIUM SERPL-MCNC: 4.1 MMOL/L — SIGNIFICANT CHANGE UP (ref 3.5–5.3)
POTASSIUM SERPL-SCNC: 4.1 MMOL/L — SIGNIFICANT CHANGE UP (ref 3.5–5.3)
PROT SERPL-MCNC: 7.4 G/DL — SIGNIFICANT CHANGE UP (ref 6–8.3)
PROT UR-MCNC: ABNORMAL
RBC # BLD: 3.03 M/UL — LOW (ref 3.8–5.2)
RBC # FLD: 14.6 % — HIGH (ref 10.3–14.5)
RBC CASTS # UR COMP ASSIST: 1 /HPF — SIGNIFICANT CHANGE UP (ref 0–4)
SAO2 % BLDV: 85 % — SIGNIFICANT CHANGE UP (ref 67–88)
SODIUM SERPL-SCNC: 148 MMOL/L — HIGH (ref 135–145)
SP GR SPEC: 1.02 — SIGNIFICANT CHANGE UP (ref 1.01–1.02)
UROBILINOGEN FLD QL: NEGATIVE — SIGNIFICANT CHANGE UP
WBC # BLD: 9.91 K/UL — SIGNIFICANT CHANGE UP (ref 3.8–10.5)
WBC # FLD AUTO: 9.91 K/UL — SIGNIFICANT CHANGE UP (ref 3.8–10.5)
WBC UR QL: 7 /HPF — HIGH (ref 0–5)

## 2020-02-14 PROCEDURE — 71045 X-RAY EXAM CHEST 1 VIEW: CPT | Mod: 26

## 2020-02-14 PROCEDURE — 99232 SBSQ HOSP IP/OBS MODERATE 35: CPT

## 2020-02-14 PROCEDURE — 99232 SBSQ HOSP IP/OBS MODERATE 35: CPT | Mod: GC

## 2020-02-14 RX ORDER — VANCOMYCIN HCL 1 G
1250 VIAL (EA) INTRAVENOUS ONCE
Refills: 0 | Status: COMPLETED | OUTPATIENT
Start: 2020-02-14 | End: 2020-02-14

## 2020-02-14 RX ORDER — VANCOMYCIN HCL 1 G
VIAL (EA) INTRAVENOUS
Refills: 0 | Status: DISCONTINUED | OUTPATIENT
Start: 2020-02-14 | End: 2020-02-15

## 2020-02-14 RX ORDER — VANCOMYCIN HCL 1 G
1250 VIAL (EA) INTRAVENOUS EVERY 12 HOURS
Refills: 0 | Status: DISCONTINUED | OUTPATIENT
Start: 2020-02-15 | End: 2020-02-15

## 2020-02-14 RX ORDER — ACETAMINOPHEN 500 MG
1000 TABLET ORAL ONCE
Refills: 0 | Status: DISCONTINUED | OUTPATIENT
Start: 2020-02-14 | End: 2020-02-27

## 2020-02-14 RX ORDER — ACETAMINOPHEN 500 MG
650 TABLET ORAL EVERY 6 HOURS
Refills: 0 | Status: DISCONTINUED | OUTPATIENT
Start: 2020-02-14 | End: 2020-03-27

## 2020-02-14 RX ORDER — PIPERACILLIN AND TAZOBACTAM 4; .5 G/20ML; G/20ML
3.38 INJECTION, POWDER, LYOPHILIZED, FOR SOLUTION INTRAVENOUS EVERY 8 HOURS
Refills: 0 | Status: DISCONTINUED | OUTPATIENT
Start: 2020-02-14 | End: 2020-02-17

## 2020-02-14 RX ORDER — PIPERACILLIN AND TAZOBACTAM 4; .5 G/20ML; G/20ML
3.38 INJECTION, POWDER, LYOPHILIZED, FOR SOLUTION INTRAVENOUS ONCE
Refills: 0 | Status: COMPLETED | OUTPATIENT
Start: 2020-02-14 | End: 2020-02-14

## 2020-02-14 RX ORDER — SODIUM CHLORIDE 9 MG/ML
1000 INJECTION, SOLUTION INTRAVENOUS
Refills: 0 | Status: DISCONTINUED | OUTPATIENT
Start: 2020-02-14 | End: 2020-02-17

## 2020-02-14 RX ORDER — SODIUM CHLORIDE 9 MG/ML
500 INJECTION, SOLUTION INTRAVENOUS
Refills: 0 | Status: DISCONTINUED | OUTPATIENT
Start: 2020-02-14 | End: 2020-02-14

## 2020-02-14 RX ORDER — ACETAMINOPHEN 500 MG
650 TABLET ORAL EVERY 6 HOURS
Refills: 0 | Status: DISCONTINUED | OUTPATIENT
Start: 2020-02-14 | End: 2020-02-14

## 2020-02-14 RX ADMIN — Medication 2.5 MILLIGRAM(S): at 05:43

## 2020-02-14 RX ADMIN — Medication 12.5 MILLIGRAM(S): at 05:43

## 2020-02-14 RX ADMIN — Medication 2.5 MILLIGRAM(S): at 00:23

## 2020-02-14 RX ADMIN — SODIUM CHLORIDE 60 MILLILITER(S): 9 INJECTION, SOLUTION INTRAVENOUS at 13:40

## 2020-02-14 RX ADMIN — Medication 2.5 MILLIGRAM(S): at 18:39

## 2020-02-14 RX ADMIN — Medication 2: at 12:41

## 2020-02-14 RX ADMIN — PANTOPRAZOLE SODIUM 40 MILLIGRAM(S): 20 TABLET, DELAYED RELEASE ORAL at 12:56

## 2020-02-14 RX ADMIN — PIPERACILLIN AND TAZOBACTAM 200 GRAM(S): 4; .5 INJECTION, POWDER, LYOPHILIZED, FOR SOLUTION INTRAVENOUS at 16:07

## 2020-02-14 RX ADMIN — PIPERACILLIN AND TAZOBACTAM 25 GRAM(S): 4; .5 INJECTION, POWDER, LYOPHILIZED, FOR SOLUTION INTRAVENOUS at 22:30

## 2020-02-14 RX ADMIN — Medication 250 MILLIGRAM(S): at 16:38

## 2020-02-14 RX ADMIN — Medication 2.5 MILLIGRAM(S): at 12:56

## 2020-02-14 RX ADMIN — INSULIN GLARGINE 20 UNIT(S): 100 INJECTION, SOLUTION SUBCUTANEOUS at 08:18

## 2020-02-14 RX ADMIN — Medication 87.5 MICROGRAM(S): at 22:30

## 2020-02-14 NOTE — PROGRESS NOTE ADULT - SUBJECTIVE AND OBJECTIVE BOX
***************************************************************  Dr. Kellie Bernal  Internal Medicine   [facility] Pager: [phone]  [facility] Pager: [phone]   ***************************************************************    AFTAB BASS  64y  MRN: [id number]    Subjective:    Patient is a 64y old  Female who presents with a chief complaint of change in mental status (13 Feb 2020 23:50)      Interval history/overnight events:    ON remained sedated however withdrawing from pain.         MEDICATIONS  (STANDING):  budesonide  80 MICROgram(s)/formoterol 4.5 MICROgram(s) Inhaler 2 Puff(s) Inhalation two times a day  dextrose 5%. 1000 milliLiter(s) (50 mL/Hr) IV Continuous <Continuous>  dextrose 50% Injectable 12.5 Gram(s) IV Push once  dextrose 50% Injectable 25 Gram(s) IV Push once  dextrose 50% Injectable 25 Gram(s) IV Push once  influenza   Vaccine 0.5 milliLiter(s) IntraMuscular once  insulin glargine Injectable (LANTUS) 20 Unit(s) SubCutaneous every morning  insulin lispro (HumaLOG) corrective regimen sliding scale   SubCutaneous three times a day before meals  insulin lispro (HumaLOG) corrective regimen sliding scale   SubCutaneous at bedtime  levothyroxine Injectable 87.5 MICROGram(s) IV Push at bedtime  methylPREDNISolone sodium succinate Injectable 12.5 milliGRAM(s) IV Push daily  metoprolol tartrate Injectable 2.5 milliGRAM(s) IV Push every 6 hours  pantoprazole  Injectable 40 milliGRAM(s) IV Push daily  primidone 50 milliGRAM(s) Oral at bedtime  risperiDONE  Disintegrating Tablet 0.25 milliGRAM(s) Oral two times a day  simvastatin 20 milliGRAM(s) Oral at bedtime    MEDICATIONS  (PRN):  acetaminophen   Tablet .. 650 milliGRAM(s) Oral every 6 hours PRN Temp greater or equal to 38C (100.4F), Mild Pain (1 - 3), Moderate Pain (4 - 6)  albuterol/ipratropium for Nebulization 3 milliLiter(s) Nebulizer every 6 hours PRN Shortness of Breath and/or Wheezing  dextrose 40% Gel 15 Gram(s) Oral once PRN Blood Glucose LESS THAN 70 milliGRAM(s)/deciLiter  glucagon  Injectable 1 milliGRAM(s) IntraMuscular once PRN Glucose <70 milliGRAM(s)/deciLiter  ketorolac   Injectable 15 milliGRAM(s) IV Push every 6 hours PRN Moderate Pain (4 - 6)  melatonin 3 milliGRAM(s) Oral at bedtime PRN Insomnia  morphine  - Injectable 4 milliGRAM(s) IV Push every 6 hours PRN Moderate Pain (4 - 6)        Objective:    Vitals: Vital Signs Last 24 Hrs  T(C): 37.1 (02-14-20 @ 06:27), Max: 37.2 (02-14-20 @ 04:50)  T(F): 98.8 (02-14-20 @ 06:27), Max: 99 (02-14-20 @ 04:50)  HR: 98 (02-14-20 @ 06:27) (82 - 100)  BP: 134/78 (02-14-20 @ 06:27) (117/44 - 160/90)  BP(mean): --  RR: 20 (02-14-20 @ 06:27) (20 - 20)  SpO2: 98% (02-14-20 @ 06:27) (91% - 100%)            I&O's Summary    LABS:    02-13    140  |  102  |  16  ----------------------------<  180<H>  4.7   |  20<L>  |  0.85  02-11    142  |  105  |  20  ----------------------------<  149<H>  5.4<H>   |  20<L>  |  0.85    Ca    9.9      13 Feb 2020 10:53  Ca    9.6      11 Feb 2020 22:22  Phos  3.1     02-13  Mg     2.4     02-13    TPro  8.0  /  Alb  4.1  /  TBili  0.1<L>  /  DBili  x   /  AST  17  /  ALT  18  /  AlkPhos  83  02-13  TPro  7.4  /  Alb  3.8  /  TBili  0.1<L>  /  DBili  x   /  AST  34  /  ALT  18  /  AlkPhos  87  02-11                    ABG - ( 13 Feb 2020 23:02 )  pH, Arterial: 7.38  pH, Blood: x     /  pCO2: 42    /  pO2: 152   / HCO3: 24    / Base Excess: -.1   /  SaO2: 99                                      10.1   10.06 )-----------( 388      ( 13 Feb 2020 10:53 )             33.3                         10.5   9.98  )-----------( 379      ( 12 Feb 2020 14:27 )             34.9     CAPILLARY BLOOD GLUCOSE      POCT Blood Glucose.: 119 mg/dL (14 Feb 2020 07:59)  POCT Blood Glucose.: 125 mg/dL (13 Feb 2020 21:56)  POCT Blood Glucose.: 182 mg/dL (13 Feb 2020 17:27)  POCT Blood Glucose.: 198 mg/dL (13 Feb 2020 12:11)          RADIOLOGY & ADDITIONAL TESTS:            Imaging Personally Reviewed:  [ ] YES  [ ] NO    Consultants involved in case:   Consultant(s) Notes Reviewed:  [ ] YES  [ ] NO:   Care Discussed with Consultants/Other Providers [ ] YES  [ ] NO ***************************************************************  Dr. Kellie Bernal  Internal Medicine   [facility] Pager: [phone]  [facility] Pager: [phone]   ***************************************************************    AFTAB BASS  64y  MRN: [id number]    Subjective:    Patient is a 64y old  Female who presents with a chief complaint of change in mental status (13 Feb 2020 23:50)      Interval history/overnight events:    ON remained sedated however withdrawing from pain. Neurology follow up placed.     This AM, remains sedated, not verbalizing, barking and growling, opens eye spontaneously. Withdraws from pain on all extremities, trying to punch physician with right hand.         MEDICATIONS  (STANDING):  budesonide  80 MICROgram(s)/formoterol 4.5 MICROgram(s) Inhaler 2 Puff(s) Inhalation two times a day  dextrose 5%. 1000 milliLiter(s) (50 mL/Hr) IV Continuous <Continuous>  dextrose 50% Injectable 12.5 Gram(s) IV Push once  dextrose 50% Injectable 25 Gram(s) IV Push once  dextrose 50% Injectable 25 Gram(s) IV Push once  influenza   Vaccine 0.5 milliLiter(s) IntraMuscular once  insulin glargine Injectable (LANTUS) 20 Unit(s) SubCutaneous every morning  insulin lispro (HumaLOG) corrective regimen sliding scale   SubCutaneous three times a day before meals  insulin lispro (HumaLOG) corrective regimen sliding scale   SubCutaneous at bedtime  levothyroxine Injectable 87.5 MICROGram(s) IV Push at bedtime  methylPREDNISolone sodium succinate Injectable 12.5 milliGRAM(s) IV Push daily  metoprolol tartrate Injectable 2.5 milliGRAM(s) IV Push every 6 hours  pantoprazole  Injectable 40 milliGRAM(s) IV Push daily  primidone 50 milliGRAM(s) Oral at bedtime  risperiDONE  Disintegrating Tablet 0.25 milliGRAM(s) Oral two times a day  simvastatin 20 milliGRAM(s) Oral at bedtime    MEDICATIONS  (PRN):  acetaminophen   Tablet .. 650 milliGRAM(s) Oral every 6 hours PRN Temp greater or equal to 38C (100.4F), Mild Pain (1 - 3), Moderate Pain (4 - 6)  albuterol/ipratropium for Nebulization 3 milliLiter(s) Nebulizer every 6 hours PRN Shortness of Breath and/or Wheezing  dextrose 40% Gel 15 Gram(s) Oral once PRN Blood Glucose LESS THAN 70 milliGRAM(s)/deciLiter  glucagon  Injectable 1 milliGRAM(s) IntraMuscular once PRN Glucose <70 milliGRAM(s)/deciLiter  ketorolac   Injectable 15 milliGRAM(s) IV Push every 6 hours PRN Moderate Pain (4 - 6)  melatonin 3 milliGRAM(s) Oral at bedtime PRN Insomnia  morphine  - Injectable 4 milliGRAM(s) IV Push every 6 hours PRN Moderate Pain (4 - 6)    PHYSICAL EXAM:  GENERAL: elderly female in NAD respiring on 3L NC, sedated, nonverbal, growling and barking   HEENT: PERRL, no scleral icterus, no head and neck lad   CHEST/LUNG: CTAB, no wheezing, rhonchi   HEART: RRR, No MGR  ABDOMEN: soft, nondistended, normoactive, nontender to palpation   SKIN: Old anterior abdominal wall wound appears chronic w/o s&s of infection (nonerythematous, no fluctuance)  NERVOUS SYSTEM: Alert & Oriented X 0; PERRL. no facial asymmetry or facial droop, moving upper extremity bl, resisting eye exam, withdrawing from pain on all extremities. Brachial and patellar reflexes 1/4 b/l. Babinski downgoing. Some cogwheel rigidity of RUE, bl tremors.   PSYCH: calm, noncooperative         Objective:    Vitals: Vital Signs Last 24 Hrs  T(C): 37.1 (02-14-20 @ 06:27), Max: 37.2 (02-14-20 @ 04:50)  T(F): 98.8 (02-14-20 @ 06:27), Max: 99 (02-14-20 @ 04:50)  HR: 98 (02-14-20 @ 06:27) (82 - 100)  BP: 134/78 (02-14-20 @ 06:27) (117/44 - 160/90)  BP(mean): --  RR: 20 (02-14-20 @ 06:27) (20 - 20)  SpO2: 98% (02-14-20 @ 06:27) (91% - 100%)            I&O's Summary      LABS:    02-13    140  |  102  |  16  ----------------------------<  180<H>  4.7   |  20<L>  |  0.85  02-11    142  |  105  |  20  ----------------------------<  149<H>  5.4<H>   |  20<L>  |  0.85    Ca    9.9      13 Feb 2020 10:53  Ca    9.6      11 Feb 2020 22:22  Phos  3.1     02-13  Mg     2.4     02-13    TPro  8.0  /  Alb  4.1  /  TBili  0.1<L>  /  DBili  x   /  AST  17  /  ALT  18  /  AlkPhos  83  02-13  TPro  7.4  /  Alb  3.8  /  TBili  0.1<L>  /  DBili  x   /  AST  34  /  ALT  18  /  AlkPhos  87  02-11                    ABG - ( 13 Feb 2020 23:02 )  pH, Arterial: 7.38  pH, Blood: x     /  pCO2: 42    /  pO2: 152   / HCO3: 24    / Base Excess: -.1   /  SaO2: 99                                      10.1   10.06 )-----------( 388      ( 13 Feb 2020 10:53 )             33.3                         10.5   9.98  )-----------( 379      ( 12 Feb 2020 14:27 )             34.9     CAPILLARY BLOOD GLUCOSE      POCT Blood Glucose.: 119 mg/dL (14 Feb 2020 07:59)  POCT Blood Glucose.: 125 mg/dL (13 Feb 2020 21:56)  POCT Blood Glucose.: 182 mg/dL (13 Feb 2020 17:27)  POCT Blood Glucose.: 198 mg/dL (13 Feb 2020 12:11)          RADIOLOGY & ADDITIONAL TESTS:            Imaging Personally Reviewed:  [ ] YES  [ ] NO    Consultants involved in case:   Consultant(s) Notes Reviewed:  [ ] YES  [ ] NO:   Care Discussed with Consultants/Other Providers [ ] YES  [ ] NO ***************************************************************  Dr. Kellie Bernal  Internal Medicine   [facility] Pager: [phone]  [facility] Pager: [phone]   ***************************************************************    AFTAB BASS  64y  MRN: [id number]    Subjective:    Patient is a 64y old  Female who presents with a chief complaint of change in mental status (13 Feb 2020 23:50)      Interval history/overnight events:    ON remained sedated however withdrawing from pain. Neurology follow up placed.     This AM, remains sedated, not verbalizing, barking and growling, opens eye spontaneously. Withdraws from pain on all extremities, trying to punch physician with right hand.         MEDICATIONS  (STANDING):  budesonide  80 MICROgram(s)/formoterol 4.5 MICROgram(s) Inhaler 2 Puff(s) Inhalation two times a day  dextrose 5%. 1000 milliLiter(s) (50 mL/Hr) IV Continuous <Continuous>  dextrose 50% Injectable 12.5 Gram(s) IV Push once  dextrose 50% Injectable 25 Gram(s) IV Push once  dextrose 50% Injectable 25 Gram(s) IV Push once  influenza   Vaccine 0.5 milliLiter(s) IntraMuscular once  insulin glargine Injectable (LANTUS) 20 Unit(s) SubCutaneous every morning  insulin lispro (HumaLOG) corrective regimen sliding scale   SubCutaneous three times a day before meals  insulin lispro (HumaLOG) corrective regimen sliding scale   SubCutaneous at bedtime  levothyroxine Injectable 87.5 MICROGram(s) IV Push at bedtime  methylPREDNISolone sodium succinate Injectable 12.5 milliGRAM(s) IV Push daily  metoprolol tartrate Injectable 2.5 milliGRAM(s) IV Push every 6 hours  pantoprazole  Injectable 40 milliGRAM(s) IV Push daily  primidone 50 milliGRAM(s) Oral at bedtime  risperiDONE  Disintegrating Tablet 0.25 milliGRAM(s) Oral two times a day  simvastatin 20 milliGRAM(s) Oral at bedtime    MEDICATIONS  (PRN):  acetaminophen   Tablet .. 650 milliGRAM(s) Oral every 6 hours PRN Temp greater or equal to 38C (100.4F), Mild Pain (1 - 3), Moderate Pain (4 - 6)  albuterol/ipratropium for Nebulization 3 milliLiter(s) Nebulizer every 6 hours PRN Shortness of Breath and/or Wheezing  dextrose 40% Gel 15 Gram(s) Oral once PRN Blood Glucose LESS THAN 70 milliGRAM(s)/deciLiter  glucagon  Injectable 1 milliGRAM(s) IntraMuscular once PRN Glucose <70 milliGRAM(s)/deciLiter  ketorolac   Injectable 15 milliGRAM(s) IV Push every 6 hours PRN Moderate Pain (4 - 6)  melatonin 3 milliGRAM(s) Oral at bedtime PRN Insomnia  morphine  - Injectable 4 milliGRAM(s) IV Push every 6 hours PRN Moderate Pain (4 - 6)    Objective:    Vitals: Vital Signs Last 24 Hrs  T(C): 37.1 (02-14-20 @ 06:27), Max: 37.2 (02-14-20 @ 04:50)  T(F): 98.8 (02-14-20 @ 06:27), Max: 99 (02-14-20 @ 04:50)  HR: 98 (02-14-20 @ 06:27) (82 - 100)  BP: 134/78 (02-14-20 @ 06:27) (117/44 - 160/90)  BP(mean): --  RR: 20 (02-14-20 @ 06:27) (20 - 20)  SpO2: 98% (02-14-20 @ 06:27) (91% - 100%)            I&O's Summary    PHYSICAL EXAM:  GENERAL: elderly female in NAD respiring on 3L NC, sedated, nonverbal, growling and barking   HEENT: PERRL, no scleral icterus, no head and neck lad   CHEST/LUNG: CTAB, no wheezing, rhonchi   HEART: RRR, No MGR  ABDOMEN: soft, nondistended, normoactive, nontender to palpation   SKIN: Old anterior abdominal wall wound appears chronic w/o s&s of infection (nonerythematous, no fluctuance)  NERVOUS SYSTEM: Alert & Oriented X 0; PERRL. no facial asymmetry or facial droop, moving upper extremity bl, resisting eye exam, withdrawing from pain on all extremities. Brachial and patellar reflexes 1/4 b/l. Babinski downgoing. Some cogwheel rigidity of RUE, bl tremors.   PSYCH: calm, noncooperative     LABS:    02-13    140  |  102  |  16  ----------------------------<  180<H>  4.7   |  20<L>  |  0.85  02-11    142  |  105  |  20  ----------------------------<  149<H>  5.4<H>   |  20<L>  |  0.85    Ca    9.9      13 Feb 2020 10:53  Ca    9.6      11 Feb 2020 22:22  Phos  3.1     02-13  Mg     2.4     02-13    TPro  8.0  /  Alb  4.1  /  TBili  0.1<L>  /  DBili  x   /  AST  17  /  ALT  18  /  AlkPhos  83  02-13  TPro  7.4  /  Alb  3.8  /  TBili  0.1<L>  /  DBili  x   /  AST  34  /  ALT  18  /  AlkPhos  87  02-11                    ABG - ( 13 Feb 2020 23:02 )  pH, Arterial: 7.38  pH, Blood: x     /  pCO2: 42    /  pO2: 152   / HCO3: 24    / Base Excess: -.1   /  SaO2: 99                                      10.1   10.06 )-----------( 388      ( 13 Feb 2020 10:53 )             33.3                         10.5   9.98  )-----------( 379      ( 12 Feb 2020 14:27 )             34.9     CAPILLARY BLOOD GLUCOSE      POCT Blood Glucose.: 119 mg/dL (14 Feb 2020 07:59)  POCT Blood Glucose.: 125 mg/dL (13 Feb 2020 21:56)  POCT Blood Glucose.: 182 mg/dL (13 Feb 2020 17:27)  POCT Blood Glucose.: 198 mg/dL (13 Feb 2020 12:11)          RADIOLOGY & ADDITIONAL TESTS:            Imaging Personally Reviewed:  [ ] YES  [ ] NO    Consultants involved in case:   Consultant(s) Notes Reviewed:  [ ] YES  [ ] NO:   Care Discussed with Consultants/Other Providers [ ] YES  [ ] NO ***************************************************************  Dr. Kellie Bernal  Internal Medicine   [facility] Pager: [phone]  [facility] Pager: [phone]   ***************************************************************    AFTAB BASS  64y  MRN: [id number]    Subjective:    Patient is a 64y old  Female who presents with a chief complaint of change in mental status (13 Feb 2020 23:50)      Interval history/overnight events:    ON remained sedated however withdrawing from pain. Neurology follow up placed ON.     This AM, remains sedated, not verbalizing, barking and growling, opens eye spontaneously. Withdraws from pain on all extremities, trying to punch physician with right hand during examination. No oral intake x 3 days. LR maintenance started.         MEDICATIONS  (STANDING):  budesonide  80 MICROgram(s)/formoterol 4.5 MICROgram(s) Inhaler 2 Puff(s) Inhalation two times a day  dextrose 5%. 1000 milliLiter(s) (50 mL/Hr) IV Continuous <Continuous>  dextrose 50% Injectable 12.5 Gram(s) IV Push once  dextrose 50% Injectable 25 Gram(s) IV Push once  dextrose 50% Injectable 25 Gram(s) IV Push once  influenza   Vaccine 0.5 milliLiter(s) IntraMuscular once  insulin glargine Injectable (LANTUS) 20 Unit(s) SubCutaneous every morning  insulin lispro (HumaLOG) corrective regimen sliding scale   SubCutaneous three times a day before meals  insulin lispro (HumaLOG) corrective regimen sliding scale   SubCutaneous at bedtime  levothyroxine Injectable 87.5 MICROGram(s) IV Push at bedtime  methylPREDNISolone sodium succinate Injectable 12.5 milliGRAM(s) IV Push daily  metoprolol tartrate Injectable 2.5 milliGRAM(s) IV Push every 6 hours  pantoprazole  Injectable 40 milliGRAM(s) IV Push daily  primidone 50 milliGRAM(s) Oral at bedtime  risperiDONE  Disintegrating Tablet 0.25 milliGRAM(s) Oral two times a day  simvastatin 20 milliGRAM(s) Oral at bedtime    MEDICATIONS  (PRN):  acetaminophen   Tablet .. 650 milliGRAM(s) Oral every 6 hours PRN Temp greater or equal to 38C (100.4F), Mild Pain (1 - 3), Moderate Pain (4 - 6)  albuterol/ipratropium for Nebulization 3 milliLiter(s) Nebulizer every 6 hours PRN Shortness of Breath and/or Wheezing  dextrose 40% Gel 15 Gram(s) Oral once PRN Blood Glucose LESS THAN 70 milliGRAM(s)/deciLiter  glucagon  Injectable 1 milliGRAM(s) IntraMuscular once PRN Glucose <70 milliGRAM(s)/deciLiter  ketorolac   Injectable 15 milliGRAM(s) IV Push every 6 hours PRN Moderate Pain (4 - 6)  melatonin 3 milliGRAM(s) Oral at bedtime PRN Insomnia  morphine  - Injectable 4 milliGRAM(s) IV Push every 6 hours PRN Moderate Pain (4 - 6)    Objective:    Vitals: Vital Signs Last 24 Hrs  T(C): 37.1 (02-14-20 @ 06:27), Max: 37.2 (02-14-20 @ 04:50)  T(F): 98.8 (02-14-20 @ 06:27), Max: 99 (02-14-20 @ 04:50)  HR: 98 (02-14-20 @ 06:27) (82 - 100)  BP: 134/78 (02-14-20 @ 06:27) (117/44 - 160/90)  BP(mean): --  RR: 20 (02-14-20 @ 06:27) (20 - 20)  SpO2: 98% (02-14-20 @ 06:27) (91% - 100%)            I&O's Summary    PHYSICAL EXAM:  GENERAL: elderly female in NAD respiring on 3L NC, sedated, nonverbal, growling and barking   HEENT: PERRL, no scleral icterus, no head and neck lad   CHEST/LUNG: CTAB, no wheezing, rhonchi   HEART: RRR, No MGR  ABDOMEN: soft, nondistended, normoactive, nontender to palpation   SKIN: Old anterior abdominal wall wound appears chronic w/o s&s of infection (nonerythematous, no fluctuance)  NERVOUS SYSTEM: Alert & Oriented X 0; PERRL. no facial asymmetry or facial droop, moving upper extremity bl, resisting eye exam, withdrawing from pain on all extremities. Brachial and patellar reflexes 1/4 b/l. Babinski downgoing. Some cogwheel rigidity of RUE, bl tremors.   PSYCH: calm, noncooperative     LABS:    02-13    140  |  102  |  16  ----------------------------<  180<H>  4.7   |  20<L>  |  0.85  02-11    142  |  105  |  20  ----------------------------<  149<H>  5.4<H>   |  20<L>  |  0.85    Ca    9.9      13 Feb 2020 10:53  Ca    9.6      11 Feb 2020 22:22  Phos  3.1     02-13  Mg     2.4     02-13    TPro  8.0  /  Alb  4.1  /  TBili  0.1<L>  /  DBili  x   /  AST  17  /  ALT  18  /  AlkPhos  83  02-13  TPro  7.4  /  Alb  3.8  /  TBili  0.1<L>  /  DBili  x   /  AST  34  /  ALT  18  /  AlkPhos  87  02-11                    ABG - ( 13 Feb 2020 23:02 )  pH, Arterial: 7.38  pH, Blood: x     /  pCO2: 42    /  pO2: 152   / HCO3: 24    / Base Excess: -.1   /  SaO2: 99                                      10.1   10.06 )-----------( 388      ( 13 Feb 2020 10:53 )             33.3                         10.5   9.98  )-----------( 379      ( 12 Feb 2020 14:27 )             34.9     CAPILLARY BLOOD GLUCOSE      POCT Blood Glucose.: 119 mg/dL (14 Feb 2020 07:59)  POCT Blood Glucose.: 125 mg/dL (13 Feb 2020 21:56)  POCT Blood Glucose.: 182 mg/dL (13 Feb 2020 17:27)  POCT Blood Glucose.: 198 mg/dL (13 Feb 2020 12:11)          RADIOLOGY & ADDITIONAL TESTS:            Imaging Personally Reviewed:  [ ] YES  [ ] NO    Consultants involved in case:   Consultant(s) Notes Reviewed:  [ ] YES  [ ] NO:   Care Discussed with Consultants/Other Providers [ ] YES  [ ] NO ***************************************************************  Dr. Kellie Bernal  Internal Medicine   [facility] Pager: [phone]  [facility] Pager: [phone]   ***************************************************************    AFTAB BASS  64y  MRN: [id number]    Subjective:    Patient is a 64y old  Female who presents with a chief complaint of change in mental status (13 Feb 2020 23:50)      Interval history/overnight events:    ON remained sedated however withdrawing from pain. Neurology follow up placed ON.     This AM, remains sedated, not verbalizing, barking and growling, opens eye spontaneously. Withdraws from pain on all extremities, trying to punch physician with right hand during examination. No oral intake x 3 days. LR maintenance started.     12 point ros negative except for above    MEDICATIONS  (STANDING):  budesonide  80 MICROgram(s)/formoterol 4.5 MICROgram(s) Inhaler 2 Puff(s) Inhalation two times a day  dextrose 5%. 1000 milliLiter(s) (50 mL/Hr) IV Continuous <Continuous>  dextrose 50% Injectable 12.5 Gram(s) IV Push once  dextrose 50% Injectable 25 Gram(s) IV Push once  dextrose 50% Injectable 25 Gram(s) IV Push once  influenza   Vaccine 0.5 milliLiter(s) IntraMuscular once  insulin glargine Injectable (LANTUS) 20 Unit(s) SubCutaneous every morning  insulin lispro (HumaLOG) corrective regimen sliding scale   SubCutaneous three times a day before meals  insulin lispro (HumaLOG) corrective regimen sliding scale   SubCutaneous at bedtime  levothyroxine Injectable 87.5 MICROGram(s) IV Push at bedtime  methylPREDNISolone sodium succinate Injectable 12.5 milliGRAM(s) IV Push daily  metoprolol tartrate Injectable 2.5 milliGRAM(s) IV Push every 6 hours  pantoprazole  Injectable 40 milliGRAM(s) IV Push daily  primidone 50 milliGRAM(s) Oral at bedtime  risperiDONE  Disintegrating Tablet 0.25 milliGRAM(s) Oral two times a day  simvastatin 20 milliGRAM(s) Oral at bedtime    MEDICATIONS  (PRN):  acetaminophen   Tablet .. 650 milliGRAM(s) Oral every 6 hours PRN Temp greater or equal to 38C (100.4F), Mild Pain (1 - 3), Moderate Pain (4 - 6)  albuterol/ipratropium for Nebulization 3 milliLiter(s) Nebulizer every 6 hours PRN Shortness of Breath and/or Wheezing  dextrose 40% Gel 15 Gram(s) Oral once PRN Blood Glucose LESS THAN 70 milliGRAM(s)/deciLiter  glucagon  Injectable 1 milliGRAM(s) IntraMuscular once PRN Glucose <70 milliGRAM(s)/deciLiter  ketorolac   Injectable 15 milliGRAM(s) IV Push every 6 hours PRN Moderate Pain (4 - 6)  melatonin 3 milliGRAM(s) Oral at bedtime PRN Insomnia  morphine  - Injectable 4 milliGRAM(s) IV Push every 6 hours PRN Moderate Pain (4 - 6)    Objective:    Vitals: Vital Signs Last 24 Hrs  T(C): 37.1 (02-14-20 @ 06:27), Max: 37.2 (02-14-20 @ 04:50)  T(F): 98.8 (02-14-20 @ 06:27), Max: 99 (02-14-20 @ 04:50)  HR: 98 (02-14-20 @ 06:27) (82 - 100)  BP: 134/78 (02-14-20 @ 06:27) (117/44 - 160/90)  BP(mean): --  RR: 20 (02-14-20 @ 06:27) (20 - 20)  SpO2: 98% (02-14-20 @ 06:27) (91% - 100%)            I&O's Summary    PHYSICAL EXAM:  GENERAL: elderly female in NAD respiring on 3L NC, sedated, nonverbal, growling and barking   HEENT: PERRL, no scleral icterus, no head and neck lad   CHEST/LUNG: CTAB, no wheezing, rhonchi   HEART: RRR, No MGR  ABDOMEN: soft, nondistended, normoactive, nontender to palpation   SKIN: Old anterior abdominal wall wound appears chronic w/o s&s of infection (nonerythematous, no fluctuance)  NERVOUS SYSTEM: Alert & Oriented X 0; PERRL. no facial asymmetry or facial droop, moving upper extremity bl, resisting eye exam, withdrawing from pain on all extremities. Brachial and patellar reflexes 1/4 b/l. Babinski downgoing. Some cogwheel rigidity of RUE, bl tremors.   PSYCH: calm, noncooperative     LABS:    02-13    140  |  102  |  16  ----------------------------<  180<H>  4.7   |  20<L>  |  0.85  02-11    142  |  105  |  20  ----------------------------<  149<H>  5.4<H>   |  20<L>  |  0.85    Ca    9.9      13 Feb 2020 10:53  Ca    9.6      11 Feb 2020 22:22  Phos  3.1     02-13  Mg     2.4     02-13    TPro  8.0  /  Alb  4.1  /  TBili  0.1<L>  /  DBili  x   /  AST  17  /  ALT  18  /  AlkPhos  83  02-13  TPro  7.4  /  Alb  3.8  /  TBili  0.1<L>  /  DBili  x   /  AST  34  /  ALT  18  /  AlkPhos  87  02-11                    ABG - ( 13 Feb 2020 23:02 )  pH, Arterial: 7.38  pH, Blood: x     /  pCO2: 42    /  pO2: 152   / HCO3: 24    / Base Excess: -.1   /  SaO2: 99                                      10.1   10.06 )-----------( 388      ( 13 Feb 2020 10:53 )             33.3                         10.5   9.98  )-----------( 379      ( 12 Feb 2020 14:27 )             34.9     CAPILLARY BLOOD GLUCOSE      POCT Blood Glucose.: 119 mg/dL (14 Feb 2020 07:59)  POCT Blood Glucose.: 125 mg/dL (13 Feb 2020 21:56)  POCT Blood Glucose.: 182 mg/dL (13 Feb 2020 17:27)  POCT Blood Glucose.: 198 mg/dL (13 Feb 2020 12:11)          RADIOLOGY & ADDITIONAL TESTS:            Imaging Personally Reviewed:  [ ] YES  [ ] NO    Consultants involved in case:   Consultant(s) Notes Reviewed:  [ ] YES  [ ] NO:   Care Discussed with Consultants/Other Providers [ ] YES  [ ] NO

## 2020-02-14 NOTE — PROGRESS NOTE ADULT - PROBLEM SELECTOR PLAN 3
Patient w/ anterior abdominal wound from SBO revision. Does not appear infected.   - wound care following; change dressing every 7 days; changed 2/6, 2/13  - wound base clean w/o infection   - CTM for s&s of infection w/ son at bedside Patient w/ anterior abdominal wound from SBO revision. Does not appear infected.   - wound care following; change dressing every 7 days; changed 2/6, 2/13  - wound base clean w/o infection   - CTM for s&s of infection

## 2020-02-14 NOTE — PROGRESS NOTE BEHAVIORAL HEALTH - ORIENTATION OTHER
pt not engaging with questions
does not respond to answer additional questions on orientation to state ETC
unable to evaluate patient not cooperative
pt not engaging with questions
pt not engaging with questions
does not respond to answer additional questions on orientation to state ETC

## 2020-02-14 NOTE — PROGRESS NOTE ADULT - PROBLEM SELECTOR PLAN 9
- DVT ppx: SWITCHED TO SCDS GIVEN BLEED  - Diet: DASH diet w/evening snack  - Dispo: pending PT    - FYI holding primidone, simvastatin, gabapentin as of 2/12 as no IV equivalent available   - ADDENDUM: Spoke to pts family members about decision making.  The children agree that Norris will serve as the point of  and will be the final decision maker representing the family for when decisions have to be made.  They are in agreement with moving forward with sedation/potential intubation for pt to undergo further medical testing. Discussions had on 2/3/20. - Davis Kramer - DVT ppx: SWITCHED TO SCDS GIVEN BLEED  - Diet: DASH diet w/evening snack when patient more awake; maintenance LR x 16 hrs   - Dispo: pending PT    - FYI holding primidone, simvastatin, gabapentin as of 2/12 as no IV equivalent available   - ADDENDUM: Spoke to pts family members about decision making.  The children agree that Norris will serve as the point of  and will be the final decision maker representing the family for when decisions have to be made.  They are in agreement with moving forward with sedation/potential intubation for pt to undergo further medical testing. Discussions had on 2/3/20. - Davis Kramer

## 2020-02-14 NOTE — PROGRESS NOTE ADULT - PROBLEM SELECTOR PLAN 2
-  on admission; 8.35 2/1; now 17.8   - anti-TPO elevated c/w autoimmune thyroiditis   - levothyroxine 175 mg changed to 87.5 mg IV; c/w current dose   - repeat TFTs in 4-6 weeks post d/c

## 2020-02-14 NOTE — PROGRESS NOTE ADULT - ATTENDING COMMENTS
Patient seen and examined today. I agree with the above findings, assessment, and plan with the following additions and exceptions:     Acute encephalopathy, unspecified.   No clear single etiology at this point in time. Likely multifactorial. Patient with severe hypothyroidism and SDH on imaging. Noted to have signs of AI at outside hospital. Undiagnosed infection is a possibility. Anterior abd wound does not show signs of infection. Patient refusing to provide us with a urine sample however no fever or leukocytosis noted. Continue to monitor off abx for now. Repeat Bcx with ngtd.   Preliminary CSF autoimmune and paraneoplastic panel negative.   2/11 episodes of somnolence may be related to VPA however dose not particularly high given patients documented weight. VPA level low. NH3 low. Patient still somnolent. Will obtain UA and Ucx. EEG pending. Neuro assistance appreciated.   Risperdal per psych. Recs appreciated.   Endo recs appreciated for hypothyroidism and dm.   Extensive discussion had Norris and Heavenly (Co-HCPs and children) at bedside. I explained that disposition to a subacute rehab or to a long term care facility would be the safest option given the ancillary staff available at these facilities to help manage agitation and her medications. They are in agreement however they would like a facility that does not have limitations to visitation hours. Request communicated to case management.   Rest of plan as above.    Dr. Avery Dugan DO  Attending Physician  Division of Hospital Medicine  [facility]  Pager:  [phone]

## 2020-02-14 NOTE — PROGRESS NOTE BEHAVIORAL HEALTH - OTHER
disengaged bed bound noverval nonverbal neutral unable to eevaluate due to sedation unable to evaluate

## 2020-02-14 NOTE — PROGRESS NOTE ADULT - PROBLEM SELECTOR PLAN 2
-Noted TSH repeated but level is not reliable at this time since pt recently started Levothyroxine therapy and med was not given on an empty stomach most of the time due to AMS.    -C/w levothyroxine 87.5 MICROGram(s) IVP daily  -NO NEED TO RETEST TSH DURING THIS ADMISSION!  -Plan discussed with pt/team.  Contact info: [phone] (24/7). pager [phone]

## 2020-02-14 NOTE — PROGRESS NOTE BEHAVIORAL HEALTH - NSBHCHARTREVIEWIMAGING_PSY_A_CORE FT
CT showing diastesis recti with large ventral hernia w/ bowels; interval worsening of severe right hip arthrosis with flattening of the femoral head and superior subluxation  < from: CT Abdomen and Pelvis No Cont (01.12.20 @ 18:20) >      EXAM:  CT ABDOMEN AND PELVIS                            PROCEDURE DATE:  01/12/2020          INTERPRETATION:  CLINICAL INFORMATION: Sepsis.    COMPARISON: 4/3/2019    PROCEDURE:   CT of the Abdomen and Pelvis was performed without intravenous contrast.   Intravenous contrast: None.  Oral contrast: None.  Sagittal and coronal reformats were performed.    FINDINGS:  Evaluation of solid organs is limited without intravenous contrast.    LOWER CHEST: Mild bibasilar atelectasis. Small right lower lobe air cyst.    LIVER: Within normal limits.  BILE DUCTS: Normal caliber.  GALLBLADDER: Within normal limits.  SPLEEN: Within normal limits.  PANCREAS: Within normal limits.  ADRENALS: Within normal limits.  KIDNEYS/URETERS: No hydronephrosis, perinephric stranding or obstructing urinary tract calculi. Right renal cyst. Punctate nonobstructing right lower pole stone.    BLADDER: Within normal limits.  REPRODUCTIVE ORGANS: Myomatous uterus.    BOWEL: No bowel obstruction. Appendix is normal. Colonic diverticulosis.  PERITONEUM: No ascites.  VESSELS: Atherosclerotic changes.  RETROPERITONEUM/LYMPH NODES: No lymphadenopathy.    ABDOMINAL WALL: Marked diastases recti with large broad-based ventral hernia containing bowel.  BONES: Degenerative changes. Interval worsening of severe right hip arthrosis with flattening of the femoral head and superior subluxation. Stable appearance of left sacroiliitis.    IMPRESSION:     No acute findings within the limitations of this noncontrast exam.    < end of copied text >
< from: MR Head w/ IV Cont (02.06.20 @ 14:42) >    Brain MRI:    Right holohemispheric subdural hematoma subacute by signal characteristics measuring 3 mm in thickness and not clearly identified on the patient's prior CT 1/12/2020.    No acute/subacute infarct or abnormal parenchymal/leptomeningeal enhancement.    Brain MRA:    No aneurysm, significant stenosis, dissection or occlusion.    Neck MRA: Suggestion of right vertebral origin stenosis with a for visualization of the origin of the right vertebral but a more normal appearing cervical segment of the right vertebral artery      < end of copied text >
< from: MR Head w/ IV Cont (02.06.20 @ 14:42) >    Brain MRI:    Right holohemispheric subdural hematoma subacute by signal characteristics measuring 3 mm in thickness and not clearly identified on the patient's prior CT 1/12/2020.    No acute/subacute infarct or abnormal parenchymal/leptomeningeal enhancement.    Brain MRA:    No aneurysm, significant stenosis, dissection or occlusion.    Neck MRA: Suggestion of right vertebral origin stenosis with a for visualization of the origin of the right vertebral but a more normal appearing cervical segment of the right vertebral artery      < end of copied text >

## 2020-02-14 NOTE — PROGRESS NOTE ADULT - PROBLEM SELECTOR PLAN 6
- Bcx + for GPR on 1/12, 1 out of 2 bottles   -+Bcx from 1/12 sent to [facility] laboratories, currently no results  - Blood cx obtained on 1/25 no growth X 5 days.  - Patient noted to have leukocytosis, but remains afebrile   - will monitor off abx for now as repeat cultures ngtd

## 2020-02-14 NOTE — PROGRESS NOTE ADULT - PROBLEM SELECTOR PLAN 1
Unclear etiology - possibly from 3mm right sided SDH sided on MRI brain; autoimmune encephalitis 2/2 longstanding RA less likely as MRI brain neg for inflammatory changes, from hypothyroidism less likely given correction of TSH/FT4 with minimal change in behavior vs. possible paraneoplastic syndrome, vs other psychiatric or infectious etiology. s/p LP 2/6 w/ sedation; CSF studies w/ 1 nucleated cell; gram stain w/ few PMNs, no orgs; CSF cultures NGTD; CSF PCR, cryptococcal antigen, HSV PCR, AFB smear, Borrelia ab, VDRL neg. Paraneoplastic panel, NMDR-Ab CSF, autoimmune panel ([place] IgM, IgG, dsDNA, anti-RNP, anti-ANF, anti-smith Ab) neg. s/p MRI 2/6 brain revealing for subacute 3mm right holohemisheric subdural hematoma   - Now w/ new change in mental status, increasing sedation 2/11. CTA head and neck w/out acute changes, CTV w/o thrombosis; possibly related to depakote sensitivity; depakote d/michael as son Norris has asked to hold further depakote as it has resulted in a change in mental status in his mother in the past lasting a few days and requiring hospitalization  - qtc 452  - neurology f/u requested; recs pending   - psych f/u requested: as per psych attending, risperidone 0.25mg BID (ordered as disintegrating tablet), consider EEG   - rheumatology recs appreciated: less likely rheumatological origin as MRI brain neg for inflammatory changes; serum ALESSIA, ZAFAR, ribosomal P, dsdna neg, NMDRAb neg   - DVT ppx changed to SCDs given bleed Unclear etiology - possibly from 3mm right sided SDH sided on MRI brain; autoimmune encephalitis 2/2 longstanding RA less likely as MRI brain neg for inflammatory changes, from hypothyroidism less likely given correction of TSH/FT4 with minimal change in behavior vs. possible paraneoplastic syndrome, vs other psychiatric or infectious etiology. s/p LP 2/6 w/ sedation; CSF studies w/ 1 nucleated cell; gram stain w/ few PMNs, no orgs; CSF cultures NGTD; CSF PCR, cryptococcal antigen, HSV PCR, AFB smear, Borrelia ab, VDRL neg. Paraneoplastic panel, NMDR-Ab CSF, autoimmune panel ([place] IgM, IgG, dsDNA, anti-RNP, anti-ANF, anti-smith Ab) neg. s/p MRI 2/6 brain revealing for subacute 3mm right holohemisheric subdural hematoma   - Now w/ new change in mental status, increasing sedation 2/11. CTA head and neck w/out acute changes, CTV w/o thrombosis; possibly related to depakote sensitivity; depakote d/michael as son Norris has asked to hold further depakote as it has resulted in a change in mental status in his mother in the past lasting a few days and requiring hospitalization  - qtc 452  - neurology f/u requested; recs pending   - psych f/u requested: as per psych attending, risperidone 0.25mg BID (ordered as disintegrating tablet), consider EEG   - rheumatology recs appreciated: less likely rheumatological origin as MRI brain neg for inflammatory changes; serum ALESSIA, ZAFAR, ribosomal P, dsdna neg, NMDRAb neg   - EEG ordered; pending   - DVT ppx changed to SCDs given bleed Unclear etiology - possibly from 3mm right sided SDH sided on MRI brain; autoimmune encephalitis 2/2 longstanding RA less likely as MRI brain neg for inflammatory changes, from hypothyroidism less likely given correction of TSH/FT4 with minimal change in behavior vs. possible paraneoplastic syndrome, vs other psychiatric or infectious etiology. s/p LP 2/6 w/ sedation; CSF studies w/ 1 nucleated cell; gram stain w/ few PMNs, no orgs; CSF cultures NGTD; CSF PCR, cryptococcal antigen, HSV PCR, AFB smear, Borrelia ab, VDRL neg. Paraneoplastic panel, NMDR-Ab CSF, autoimmune panel ([place] IgM, IgG, dsDNA, anti-RNP, anti-ANF, anti-smith Ab) neg. s/p MRI 2/6 brain revealing for subacute 3mm right holohemisheric subdural hematoma   - Now w/ new change in mental status, increasing sedation 2/11. CTA head and neck w/out acute changes, CTV w/o thrombosis; possibly related to depakote sensitivity; depakote d/michael as son Norris has asked to hold further depakote as it has resulted in a change in mental status in his mother in the past lasting a few days and requiring hospitalization  - qtc 452  - neurology f/u requested: veeg   - psych f/u requested: as per psych attending, risperidone 0.25mg BID (ordered as disintegrating tablet), consider EEG   - rheumatology recs appreciated: less likely rheumatological origin as MRI brain neg for inflammatory changes; serum ALESSIA, ZAFAR, ribosomal P, dsdna neg, NMDRAb neg   - EEG ordered; pending   - f/u UA, UCx   - DVT ppx changed to SCDs given bleed Unclear etiology - possibly from 3mm right sided SDH sided on MRI brain; autoimmune encephalitis 2/2 longstanding RA less likely as MRI brain neg for inflammatory changes, from hypothyroidism less likely given correction of TSH/FT4 with minimal change in behavior vs. possible paraneoplastic syndrome, vs other psychiatric or infectious etiology. s/p LP 2/6 w/ sedation; CSF studies w/ 1 nucleated cell; gram stain w/ few PMNs, no orgs; CSF cultures NGTD; CSF PCR, cryptococcal antigen, HSV PCR, AFB smear, Borrelia ab, VDRL neg. Paraneoplastic panel, NMDR-Ab CSF, autoimmune panel ([place] IgM, IgG, dsDNA, anti-RNP, anti-ANF, anti-smith Ab) neg. s/p MRI 2/6 brain revealing for subacute 3mm right holohemisheric subdural hematoma   - Now w/ new change in mental status, increasing sedation 2/11. CTA head and neck w/out acute changes, CTV w/o thrombosis; possibly related to depakote sensitivity; depakote d/michael as son Norris has asked to hold further depakote as it has resulted in a change in mental status in his mother in the past lasting a few days and requiring hospitalization; r/o metabolic encephalopathy possibly from UTI  - qtc 452  - neurology f/u requested: veeg   - psych f/u requested: as per psych attending, risperidone 0.25mg BID (ordered as disintegrating tablet), consider EEG   - rheumatology recs appreciated: less likely rheumatological origin as MRI brain neg for inflammatory changes; serum ALESSIA, ZAFAR, ribosomal P, dsdna neg, NMDRAb neg   - EEG ordered; pending   - f/u UA, UCx   - DVT ppx changed to SCDs given bleed Unclear etiology - possibly from 3mm right sided SDH sided on MRI brain; autoimmune encephalitis 2/2 longstanding RA less likely as MRI brain neg for inflammatory changes, from hypothyroidism less likely given correction of TSH/FT4 with minimal change in behavior vs. possible paraneoplastic syndrome, vs other psychiatric or infectious etiology. s/p LP 2/6 w/ sedation; CSF studies w/ 1 nucleated cell; gram stain w/ few PMNs, no orgs; CSF cultures NGTD; CSF PCR, cryptococcal antigen, HSV PCR, AFB smear, Borrelia ab, VDRL neg. Paraneoplastic panel, NMDR-Ab CSF, autoimmune panel ([place] IgM, IgG, dsDNA, anti-RNP, anti-ANF, anti-smith Ab) neg. s/p MRI 2/6 brain revealing for subacute 3mm right holohemisheric subdural hematoma   - Now w/ new change in mental status, increasing sedation 2/11. CTA head and neck w/out acute changes, CTV w/o thrombosis; possibly related to depakote sensitivity; depakote d/michael as son Norris has asked to hold further depakote as it has resulted in a change in mental status in his mother in the past lasting a few days and requiring hospitalization; r/o metabolic encephalopathy possibly from UTI  - qtc 452  - neurology f/u requested: veeg   - psych f/u requested: as per psych attending, risperidone 0.25mg BID (ordered as disintegrating tablet), consider EEG   - rheumatology recs appreciated: less likely rheumatological origin as MRI brain neg for inflammatory changes; serum ALESSIA, ZAFAR, ribosomal P, dsdna neg, NMDRAb neg   - EEG ordered; pending   - f/u UA, UCx  - DVT ppx changed to SCDs given bleed

## 2020-02-14 NOTE — PROGRESS NOTE ADULT - PROBLEM SELECTOR PLAN 4
-  with slight AG (18), BHB 4 (elevated) on presentation; hospital course complicated by FBGs in 300s; then hypoglycemia likely due to poor PO intake given altered mentation   - holding lantus 34 + Humalog 9 TID with meals as patient not currently eating at bl  - will c/w lantus 20 as patient w/ lower PO intake due to AMS; holding and mod ISS  - FBGs controlled   - reintroduce pre-meal coverage as mentation improves

## 2020-02-14 NOTE — PROGRESS NOTE ADULT - SUBJECTIVE AND OBJECTIVE BOX
DIABETES FOLLOW UP NOTE: Saw pt earlier today  INTERVAL HX: 63 y/o F w/h/o uncontrolled T2DM with unknown complications. Also h/o functional paraplegia, hypothyroidism, pulmonary hypertension, COPD, RA on chronic prednisone, SBO s/p ex lap with lysis of adhesions c/b multifocal pneumonia. Here with AMS/agitation. Pt still with AMS of unclear etiology and lack of capacity per mental health team. Also recent RRT with tachycardia, lethargy and no responding to verbal stimuli. Saw p t earlier today. Pt alert but not talking, moving extremities without purpose and not eating per report. Glycemic control at goal while on present insulin doses.  No hypoglycemia.      Review of Systems:  Unable with AMS    Allergies    Depakote (Other)    Intolerances    Seroquel (Other)    MEDICATIONS    insulin glargine Injectable (LANTUS) 20 Unit(s) SubCutaneous every morning  insulin lispro (HumaLOG) corrective regimen sliding scale   SubCutaneous three times a day before meals  insulin lispro (HumaLOG) corrective regimen sliding scale   SubCutaneous at bedtime  levothyroxine Injectable 87.5 MICROGram(s) IV Push at bedtime  methylPREDNISolone sodium succinate Injectable 12.5 milliGRAM(s) IV Push daily  simvastatin 20 milliGRAM(s) Oral at bedtime      PHYSICAL EXAM:  VITALS: T(C): 37.9 (02-14-20 @ 12:55)  T(F): 100.3 (02-14-20 @ 12:55), Max: 100.3 (02-14-20 @ 12:55)  HR: 111 (02-14-20 @ 12:55) (82 - 111)  BP: 159/82 (02-14-20 @ 12:55) (117/44 - 159/82)  RR:  (20 - 20)  SpO2:  (91% - 99%)  Wt(kg): --  GENERAL: Female laying in bed in NAD. Sister at bedside  Abdomen: Soft, nontender, non distended, obese  Extremities: Warm, no edema in all 4 exts  NEURO: Alert but with AMS, moves arms without purpose.     LABS:  POCT Blood Glucose.: 172 mg/dL (02-14-20 @ 11:47)  POCT Blood Glucose.: 119 mg/dL (02-14-20 @ 07:59)  POCT Blood Glucose.: 125 mg/dL (02-13-20 @ 21:56)  POCT Blood Glucose.: 182 mg/dL (02-13-20 @ 17:27)  POCT Blood Glucose.: 198 mg/dL (02-13-20 @ 12:11)  POCT Blood Glucose.: 172 mg/dL (02-13-20 @ 07:50)  POCT Blood Glucose.: 140 mg/dL (02-12-20 @ 21:29)  POCT Blood Glucose.: 149 mg/dL (02-12-20 @ 17:45)  POCT Blood Glucose.: 208 mg/dL (02-12-20 @ 13:46)  POCT Blood Glucose.: 202 mg/dL (02-12-20 @ 10:50)  POCT Blood Glucose.: 105 mg/dL (02-12-20 @ 06:11)  POCT Blood Glucose.: 146 mg/dL (02-11-20 @ 21:41)  POCT Blood Glucose.: 170 mg/dL (02-11-20 @ 18:34)                            10.1   10.06 )-----------( 388      ( 13 Feb 2020 10:53 )             33.3       02-13    140  |  102  |  16  ----------------------------<  180<H>  4.7   |  20<L>  |  0.85    EGFR if : 85    Ca    9.9      02-13  Mg     2.4     02-13  Phos  3.1     02-13    TPro  8.0  /  Alb  4.1  /  TBili  0.1<L>  /  DBili  x   /  AST  17  /  ALT  18  /  AlkPhos  83  02-13      Thyroid Function Tests:  02-13 @ 09:05 TSH 17.80 FreeT4 -- T3 -- Anti TPO -- Anti Thyroglobulin Ab -- TSI --  02-01 @ 22:37 TSH 8.35 FreeT4 1.3 T3 -- Anti TPO -- Anti Thyroglobulin Ab -- TSI --      Hemoglobin A1C, Whole Blood: 9.0 % <H> [4.0 - 5.6] (01-28-20 @ 19:30)  Hemoglobin A1C, Whole Blood: 9.2 % <H> [4.0 - 5.6] (01-27-20 @ 17:36)      01-27 Chol 307<H> <H> HDL 50 Trig 352<H>

## 2020-02-14 NOTE — PROGRESS NOTE ADULT - PROBLEM SELECTOR PLAN 1
-Test BG ac and hs  -C/w Lantus 20 units q am  -c/w Humalog moderate correction scale since pt is on Prednisone.  -Might start low dose of 4 units premeal insulin once pt tolerating POs.    --Pt unable to care for self at this time. Pt will need to continue basal/bolus therapy upon discharge.  -Plan discussed with pt's team.  Contact info: [phone] (24/7). pager [phone].

## 2020-02-14 NOTE — PROGRESS NOTE BEHAVIORAL HEALTH - NSBHCONSULTMEDSEVERE_PSY_A_CORE FT
haldol 2 mg IVP q4 hrs prn agitation, f/u QTc < 500
haldol 2 mg IVP q4 hrs prn agitation, f/u QTc < 500
haldol 2 mg IV/IM Q4hr PRN, f/u QTc < 500
haldol 1 mg IVP q4 hrs prn agitation, f/u QTc < 500
haldol 2 mg IV/IM Q4hr PRN, f/u QTc < 500
haldol 2 mg IV/IM Q4hr PRN, f/u QTc < 500
haldol 2 mg IVP q4 hrs prn agitation, f/u QTc < 500
haldol 2 mg IVP q4 hrs prn agitation, f/u QTc < 500
haldol 2 mg IV/IM Q4hr PRN, f/u QTc < 500
haldol 2 mg IV/IM Q4hr PRN, f/u QTc < 500
haldol 2mg IVP q4 hrs prn agitation

## 2020-02-14 NOTE — PROGRESS NOTE ADULT - ASSESSMENT
63 y/o F w/h/o uncontrolled T2DM with unknown complications. Also h/o functional paraplegia, hypothyroidism, pulmonary hypertension, COPD, RA on chronic prednisone, SBO s/p ex lap with lysis of adhesions c/b multifocal pneumonia. Here with AMS/agitation. Pt still with AMS of unclear etiology and lack of capacity per mental health team. Also recent RRT with tachycardia, lethargy and no responding to verbal stimuli. Saw p t earlier today. Pt alert but not talking, moving extremities without purpose and not eating per report. Glycemic control at goal while on present insulin doses. No hypoglycemia. Will continue present insulin doses. Spoke to team about pt not having any POs for the past 3 days. Will start some IV fluids

## 2020-02-14 NOTE — PROGRESS NOTE ADULT - ASSESSMENT
64F w/ a PMHx arthritis, hypothyroidism, pulmonary HTN, DM on insulin, HTN, COPD/CHRIS on noctournal CPAP and 3L home), RA on chronic prednisone, chronic tremors, SBO s/p ex lap with lysis of adhesions c/b evisceration 2/2 a coughing episode s/p repeat ex lap c/b multifocal pneumonia and hypercapnic respiratory failure requiring intubation 1/6/19, s/p bronch 1/9/19, and extubated on 1/24/19; further c/b Enterococci bacteremia), p/w agitation 3 weeks of unclear etiology, found to be significantly hypothyroid (), extremely combative and paranoid of family and healthcare staff, s/p MRI brain and LP found to have small right sided SDH, course c/b hyperglycemia; now w/ new change in mental status w/ repeat CT head w/o acute changes; CTA head and neck and CTV without any new changes.

## 2020-02-15 LAB
ALBUMIN SERPL ELPH-MCNC: 3.9 G/DL — SIGNIFICANT CHANGE UP (ref 3.3–5)
ALP SERPL-CCNC: 72 U/L — SIGNIFICANT CHANGE UP (ref 40–120)
ALT FLD-CCNC: 16 U/L — SIGNIFICANT CHANGE UP (ref 10–45)
ANION GAP SERPL CALC-SCNC: 19 MMOL/L — HIGH (ref 5–17)
AST SERPL-CCNC: 12 U/L — SIGNIFICANT CHANGE UP (ref 10–40)
BASOPHILS # BLD AUTO: 0.05 K/UL — SIGNIFICANT CHANGE UP (ref 0–0.2)
BASOPHILS NFR BLD AUTO: 0.5 % — SIGNIFICANT CHANGE UP (ref 0–2)
BILIRUB SERPL-MCNC: 0.2 MG/DL — SIGNIFICANT CHANGE UP (ref 0.2–1.2)
BUN SERPL-MCNC: 15 MG/DL — SIGNIFICANT CHANGE UP (ref 7–23)
CALCIUM SERPL-MCNC: 9.3 MG/DL — SIGNIFICANT CHANGE UP (ref 8.4–10.5)
CHLORIDE SERPL-SCNC: 102 MMOL/L — SIGNIFICANT CHANGE UP (ref 96–108)
CO2 SERPL-SCNC: 20 MMOL/L — LOW (ref 22–31)
CREAT SERPL-MCNC: 0.92 MG/DL — SIGNIFICANT CHANGE UP (ref 0.5–1.3)
EOSINOPHIL # BLD AUTO: 0.03 K/UL — SIGNIFICANT CHANGE UP (ref 0–0.5)
EOSINOPHIL NFR BLD AUTO: 0.3 % — SIGNIFICANT CHANGE UP (ref 0–6)
GLUCOSE BLDC GLUCOMTR-MCNC: 104 MG/DL — HIGH (ref 70–99)
GLUCOSE BLDC GLUCOMTR-MCNC: 118 MG/DL — HIGH (ref 70–99)
GLUCOSE BLDC GLUCOMTR-MCNC: 191 MG/DL — HIGH (ref 70–99)
GLUCOSE BLDC GLUCOMTR-MCNC: 228 MG/DL — HIGH (ref 70–99)
GLUCOSE SERPL-MCNC: 190 MG/DL — HIGH (ref 70–99)
HCT VFR BLD CALC: 33.2 % — LOW (ref 34.5–45)
HGB BLD-MCNC: 9.6 G/DL — LOW (ref 11.5–15.5)
IMM GRANULOCYTES NFR BLD AUTO: 0.3 % — SIGNIFICANT CHANGE UP (ref 0–1.5)
LYMPHOCYTES # BLD AUTO: 1.54 K/UL — SIGNIFICANT CHANGE UP (ref 1–3.3)
LYMPHOCYTES # BLD AUTO: 15.3 % — SIGNIFICANT CHANGE UP (ref 13–44)
MAGNESIUM SERPL-MCNC: 2.1 MG/DL — SIGNIFICANT CHANGE UP (ref 1.6–2.6)
MCHC RBC-ENTMCNC: 28.9 GM/DL — LOW (ref 32–36)
MCHC RBC-ENTMCNC: 29.8 PG — SIGNIFICANT CHANGE UP (ref 27–34)
MCV RBC AUTO: 103.1 FL — HIGH (ref 80–100)
MONOCYTES # BLD AUTO: 0.49 K/UL — SIGNIFICANT CHANGE UP (ref 0–0.9)
MONOCYTES NFR BLD AUTO: 4.9 % — SIGNIFICANT CHANGE UP (ref 2–14)
NEUTROPHILS # BLD AUTO: 7.95 K/UL — HIGH (ref 1.8–7.4)
NEUTROPHILS NFR BLD AUTO: 78.7 % — HIGH (ref 43–77)
NRBC # BLD: 0 /100 WBCS — SIGNIFICANT CHANGE UP (ref 0–0)
PHOSPHATE SERPL-MCNC: 3 MG/DL — SIGNIFICANT CHANGE UP (ref 2.5–4.5)
PLATELET # BLD AUTO: 409 K/UL — HIGH (ref 150–400)
POTASSIUM SERPL-MCNC: 4.5 MMOL/L — SIGNIFICANT CHANGE UP (ref 3.5–5.3)
POTASSIUM SERPL-SCNC: 4.5 MMOL/L — SIGNIFICANT CHANGE UP (ref 3.5–5.3)
PROT SERPL-MCNC: 7.2 G/DL — SIGNIFICANT CHANGE UP (ref 6–8.3)
RBC # BLD: 3.22 M/UL — LOW (ref 3.8–5.2)
RBC # FLD: 14.6 % — HIGH (ref 10.3–14.5)
SODIUM SERPL-SCNC: 141 MMOL/L — SIGNIFICANT CHANGE UP (ref 135–145)
WBC # BLD: 10.09 K/UL — SIGNIFICANT CHANGE UP (ref 3.8–10.5)
WBC # FLD AUTO: 10.09 K/UL — SIGNIFICANT CHANGE UP (ref 3.8–10.5)

## 2020-02-15 PROCEDURE — 99232 SBSQ HOSP IP/OBS MODERATE 35: CPT

## 2020-02-15 PROCEDURE — 99233 SBSQ HOSP IP/OBS HIGH 50: CPT | Mod: GC

## 2020-02-15 RX ORDER — INSULIN LISPRO 100/ML
VIAL (ML) SUBCUTANEOUS EVERY 6 HOURS
Refills: 0 | Status: DISCONTINUED | OUTPATIENT
Start: 2020-02-15 | End: 2020-02-21

## 2020-02-15 RX ADMIN — Medication 2.5 MILLIGRAM(S): at 06:07

## 2020-02-15 RX ADMIN — Medication 2.5 MILLIGRAM(S): at 17:58

## 2020-02-15 RX ADMIN — PIPERACILLIN AND TAZOBACTAM 25 GRAM(S): 4; .5 INJECTION, POWDER, LYOPHILIZED, FOR SOLUTION INTRAVENOUS at 06:24

## 2020-02-15 RX ADMIN — Medication 12.5 MILLIGRAM(S): at 06:07

## 2020-02-15 RX ADMIN — Medication 2.5 MILLIGRAM(S): at 12:40

## 2020-02-15 RX ADMIN — PIPERACILLIN AND TAZOBACTAM 25 GRAM(S): 4; .5 INJECTION, POWDER, LYOPHILIZED, FOR SOLUTION INTRAVENOUS at 14:53

## 2020-02-15 RX ADMIN — Medication 2: at 12:34

## 2020-02-15 RX ADMIN — PANTOPRAZOLE SODIUM 40 MILLIGRAM(S): 20 TABLET, DELAYED RELEASE ORAL at 14:54

## 2020-02-15 RX ADMIN — INSULIN GLARGINE 20 UNIT(S): 100 INJECTION, SOLUTION SUBCUTANEOUS at 08:05

## 2020-02-15 RX ADMIN — Medication 250 MILLIGRAM(S): at 06:24

## 2020-02-15 RX ADMIN — Medication 87.5 MICROGRAM(S): at 22:55

## 2020-02-15 RX ADMIN — Medication 2.5 MILLIGRAM(S): at 00:57

## 2020-02-15 RX ADMIN — PIPERACILLIN AND TAZOBACTAM 25 GRAM(S): 4; .5 INJECTION, POWDER, LYOPHILIZED, FOR SOLUTION INTRAVENOUS at 22:55

## 2020-02-15 NOTE — PROGRESS NOTE ADULT - PROBLEM SELECTOR PLAN 1
Unclear etiology - no believed to be from 3mm right sided SDH sided on MRI brain based on the fact it is very small and does not appear to be acute with pts AMS occuring after MRI was already initially performed and the size was stable afterwards; autoimmune encephalitis 2/2 longstanding RA less likely as MRI brain neg for inflammatory changes, from hypothyroidism less likely given correction of TSH/FT4 with minimal change in behavior vs. possible paraneoplastic syndrome, vs other psychiatric or infectious etiology. s/p LP 2/6 w/ sedation; CSF studies w/ 1 nucleated cell; gram stain w/ few PMNs, no orgs; CSF cultures NGTD; CSF PCR, cryptococcal antigen, HSV PCR, AFB smear, Borrelia ab, VDRL neg. Paraneoplastic panel, NMDR-Ab CSF, autoimmune panel ([place] IgM, IgG, dsDNA, anti-RNP, anti-ANF, anti-smith Ab) neg. s/p MRI 2/6 brain revealing for subacute 3mm right holohemisheric subdural hematoma   - Now w/ new change in mental status, increasing sedation 2/11. CTA head and neck w/out acute changes, CTV w/o thrombosis; possibly related to depakote sensitivity; depakote d/michael as son Norris has asked to hold further depakote as it has resulted in a change in mental status in his mother in the past lasting a few days and requiring hospitalization however it seems highly unlikely that this would still be the cause of pts current status given level was checked and was <3 and in pts who are unable to break down depakote the ammonia level could be elevated but it was not; r/o metabolic encephalopathy possibly from UTI  - qtc 452  - neurology f/u requested: veeg   - psych f/u requested: as per psych attending, risperidone 0.25mg BID (ordered as disintegrating tablet), consider EEG - psych continues to say depakote small dose PRN but ana maría Pisano adamantly opposes  - rheum: less likely rheumatological origin as MRI brain neg for inflammatory changes; serum ALESSIA, ZAFAR, ribosomal P, dsdna neg, NMDRAb neg   - EEG ordered  - f/u UA, UCx, BCx from 2/14 Unclear etiology - not believed to be from 3mm right sided SDH based on the fact it is very small and does not appear to be acute with pts AMS occurring after MRI was already initially performed where SDH was seen and size was stable afterwards on f/u CT; autoimmune encephalitis 2/2 longstanding RA less likely as MRI brain neg for inflammatory changes, from hypothyroidism less likely given correction of TSH/FT4 with minimal change in behavior vs. possible paraneoplastic syndrome, vs other psychiatric or infectious etiology. s/p LP 2/6 w/ sedation; CSF studies w/ 1 nucleated cell; gram stain w/ few PMNs, no orgs; CSF cultures NGTD; CSF PCR, cryptococcal antigen, HSV PCR, AFB smear, Borrelia ab, VDRL neg. Paraneoplastic panel, NMDR-Ab CSF, autoimmune panel ([place] IgM, IgG, dsDNA, anti-RNP, anti-ANF, anti-smith Ab) neg. s/p MRI 2/6 brain revealing for subacute 3mm right holohemisheric subdural hematoma   - new change in mental status, increasing sedation 2/11. CTA head and neck w/out acute changes, CTV w/o thrombosis; possibly depakote sensitivity; depakote d/michael as ana maría Pisano asked to hold further depakote as it resulted in AMS in the past lasting a few days and requiring hospitalization however seems highly unlikely that this would still be causing pts current status given level was checked and was <3 and in pts who are unable to break down depakote the ammonia level could be elevated but it was not; r/o metabolic encephalopathy possibly from UTI  - qtc 452  - neurology f/u requested: veeg   - psych f/u requested: as per psych attending, risperidone 0.25mg BID (ordered as disintegrating tablet), consider EEG - psych continues to say depakote small dose PRN but ana maría Pisano adamantly opposes; also opposed risperidone  - rheum: less likely rheumatological origin as MRI brain neg for inflammatory changes; serum ALESSIA, ZAFAR, ribosomal P, dsdna neg, NMDRAb neg   - EEG ordered  - f/u UA, UCx, BCx from 2/14 - ZOSYN started 2/14 after pt spiked fever Unclear etiology - not believed to be from 3mm right sided SDH based on the fact it is very small and does not appear to be acute with pts AMS occurring after MRI was already initially performed where SDH was seen and size was stable afterwards on f/u CT; autoimmune encephalitis 2/2 longstanding RA less likely as MRI brain neg for inflammatory changes, from hypothyroidism less likely given correction of TSH/FT4 with minimal change in behavior vs. possible paraneoplastic syndrome, vs other psychiatric or infectious etiology. s/p LP 2/6 w/ sedation; CSF studies w/ 1 nucleated cell; gram stain w/ few PMNs, no orgs; CSF cultures NGTD; CSF PCR, cryptococcal antigen, HSV PCR, AFB smear, Borrelia ab, VDRL neg. Paraneoplastic panel, NMDR-Ab CSF, autoimmune panel ([place] IgM, IgG, dsDNA, anti-RNP, anti-ANF, anti-smith Ab) neg. s/p MRI 2/6 brain revealing for subacute 3mm right holohemisheric subdural hematoma   - new change in mental status, increasing sedation 2/11. CTA head and neck w/out acute changes, CTV w/o thrombosis; possibly depakote sensitivity; depakote d/michael as ana maría Pisano asked to hold further depakote as it resulted in AMS in the past lasting a few days and requiring hospitalization however seems highly unlikely that this would still be causing pts current status given level was checked and was <3 and in pts who are unable to break down depakote the ammonia level could be elevated but it was not; r/o metabolic encephalopathy possibly from UTI  - qtc 452  - neurology f/u requested: veeg   - psych f/u requested: as per psych attending, risperidone 0.25mg BID (ordered as disintegrating tablet), consider EEG - psych continues to say depakote small dose PRN but ana maría Pisano adamantly opposes; also opposed risperidone  - rheum: less likely rheumatological origin as MRI brain neg for inflammatory changes; serum ALESSIA, ZAFAR, ribosomal P, dsdna neg, NMDRAb neg   - f/u UA, UCx, BCx from 2/14 - ZOSYN started 2/14 after pt spiked fever

## 2020-02-15 NOTE — PROGRESS NOTE ADULT - PROBLEM SELECTOR PLAN 9
- DVT ppx: SSCDS GIVEN BLEED  - Diet: DASH diet w/evening snack when patient more awake; maintenance LR x 16 hrs   - Dispo: pending PT    - FYI holding primidone, simvastatin, gabapentin as of 2/12 as no IV equivalent available   - ADDENDUM: Spoke to pts family members about decision making.  The children agree that Norris will serve as the point of  and will be the final decision maker representing the family for when decisions have to be made.  They are in agreement with moving forward with sedation/potential intubation for pt to undergo further medical testing. Discussions had on 2/3/20. - Davis Kramer

## 2020-02-15 NOTE — PROGRESS NOTE ADULT - PROBLEM SELECTOR PLAN 6
- Bcx + for GPR on 1/12, 1 out of 2 bottles   -+Bcx from 1/12 sent to [facility] laboratories, currently no results  - Blood cx obtained on 1/25 no growth X 5 days.  - Patient noted to have leukocytosis, but remains afebrile   - will monitor off abx for now as repeat cultures ngtd

## 2020-02-15 NOTE — PROGRESS NOTE ADULT - ASSESSMENT
66 y/o woman hx of  DM, hypothyroidism, COPD, RA on steroids, Essential tremor, SBO s/p ex lap with lysis of adhesions (4 retention sutures) c/b eviscerated 2/2 a coughing episode s/p ex lap (6 retention sutures) c/b multifocal pneumonia, developed hypercapnic respiratory failure requiring intubation 1/6/19, s/p bronch 1/9/19, and extubated on 1/24/19, course further c/b Enterococci bacteremia prior to current admission, p/w 3 weeks on psychosis and delusional thinking prior to admission. Pt had LP with unremarkable CSF Feb 6th. NMDA negative. Still pending encephalitis panel results. She has a small right holo cephalic SDH.   CTA was negative for vessel occlusion, cerebral venous drainage also normal, no thrombosis.  Pt today was more awake and interactive, she was watching her grandson and was not agitated.      Likely frontotemporal dementia. Differential includes delirium, although new onset psychosis,, unlikely AIE.     -Pending continuous EEG to complete neuro workup.     -Can consider amantadine 100mg daily if son is agreeable.

## 2020-02-15 NOTE — PROGRESS NOTE ADULT - ATTENDING COMMENTS
Patient seen and examined today. I agree with the above findings, assessment, and plan with the following additions and exceptions:     Acute encephalopathy, unspecified.   No clear single etiology at this point in time. Likely multifactorial. Patient with severe hypothyroidism and SDH on imaging. Noted to have signs of AI at outside hospital. Undiagnosed infection is a possibility. Anterior abd wound does not show signs of infection. Patient refusing to provide us with a urine sample however no fever or leukocytosis noted. Continue to monitor off abx for now. Repeat Bcx with ngtd.   Preliminary CSF autoimmune and paraneoplastic panel negative.   2/11 episodes of somnolence may be related to VPA however dose not particularly high given patients documented weight. VPA level low. NH3 low. Most recent episode of acute alteration in mental status improved after IV abx. UA positive. Follow up Ucx. Low suspicion for gram positive infection. Will d/c vancomycin and monitor Bcx.   Risperdal per psych. Recs appreciated.   Endo recs appreciated for hypothyroidism and dm.   Extensive discussion had Lesly (Co-HCPs and children) at bedside. I explained that disposition to a subacute rehab or to a long term care facility would be the safest option given the ancillary staff available at these facilities to help manage agitation and her medications. They are in agreement however they would like a facility that does not have limitations to visitation hours. Request communicated to case management.   Rest of plan as above.    Dr. Avery Dugan DO  Attending Physician  Division of Hospital Medicine  [facility]  Pager:  [phone] Patient seen and examined today. I agree with the above findings, assessment, and plan with the following additions and exceptions:     Acute encephalopathy, unspecified.   No clear single etiology at this point in time. Likely multifactorial. Patient with severe hypothyroidism and SDH on imaging. Noted to have signs of AI at outside hospital. Anterior abd wound does not show signs of infection. Preliminary CSF autoimmune and paraneoplastic panel negative.   2/11 episodes of somnolence may be related to VPA however dose not particularly high given patients documented weight. VPA level low. NH3 low. Most recent episode of acute alteration in mental status improved after IV abx. UA positive. Follow up Ucx. Low suspicion for gram positive infection. Will d/c vancomycin and monitor Bcx.   Risperdal per psych. Recs appreciated.   Endo recs appreciated for hypothyroidism and dm.   Extensive discussion had Lesly (Co-HCPs and children) at bedside. I explained that disposition to a subacute rehab or to a long term care facility would be the safest option given the ancillary staff available at these facilities to help manage agitation and her medications. They are in agreement however they would like a facility that does not have limitations to visitation hours. Request communicated to case management.   Rest of plan as above.    Dr. Avery Dugan, DO  Attending Physician  Division of Hospital Medicine  [facility]  Pager:  [phone]

## 2020-02-15 NOTE — PROGRESS NOTE ADULT - PROBLEM SELECTOR PLAN 2
-  on admission; 8.35 2/1; now 17.8   - anti-TPO elevated c/w autoimmune thyroiditis   - levothyroxine 175 mg changed to 87.5 mg IV; c/w current dose   - repeat TFTs in 4-6 weeks post d/c, no need to repeat this admission

## 2020-02-15 NOTE — PROGRESS NOTE ADULT - SUBJECTIVE AND OBJECTIVE BOX
Davis Kramer MD  Cell: [phone]  Pager: [phone]    S:  No overnight events.  Febrile yesterday so abx started.  Pt still extremely lethargic.      PHYSICAL EXAM:   GENERAL: elderly female in NAD respiring on 3L NC, sedated, nonverbal, occasional moaning  HEENT: no scleral icterus, no head and neck lad   CHEST/LUNG: CTAB, no wheezing, rhonchi   HEART: RRR, No MGR  ABDOMEN: soft, nondistended, normoactive, nontender to palpation   SKIN: Old anterior abdominal wall wound appears chronic w/o s&s of infection (nonerythematous, no fluctuance)  NERVOUS SYSTEM: Alert & Oriented X 0; no facial asymmetry or facial droop, moving upper extremity bl, resisting eye exam, withdrawing from pain on all extremities                          9.5    9.91  )-----------( 404      ( 14 Feb 2020 15:33 )             30.8     02-14    148<H>  |  107  |  17  ----------------------------<  123<H>  4.1   |  22  |  0.86    Ca    9.5      14 Feb 2020 15:33  Phos  2.8     02-14  Mg     2.2     02-14    TPro  7.4  /  Alb  4.2  /  TBili  0.2  /  DBili  x   /  AST  11  /  ALT  15  /  AlkPhos  74  02-14      CAPILLARY BLOOD GLUCOSE    POCT Blood Glucose.: 118 mg/dL (15 Feb 2020 07:34)  POCT Blood Glucose.: 79 mg/dL (14 Feb 2020 22:07)  POCT Blood Glucose.: 124 mg/dL (14 Feb 2020 17:39)  POCT Blood Glucose.: 172 mg/dL (14 Feb 2020 11:47)      MEDICATIONS  (STANDING):  acetaminophen  IVPB .. 1000 milliGRAM(s) IV Intermittent once  budesonide  80 MICROgram(s)/formoterol 4.5 MICROgram(s) Inhaler 2 Puff(s) Inhalation two times a day  dextrose 5%. 1000 milliLiter(s) (50 mL/Hr) IV Continuous <Continuous>  dextrose 50% Injectable 12.5 Gram(s) IV Push once  dextrose 50% Injectable 25 Gram(s) IV Push once  dextrose 50% Injectable 25 Gram(s) IV Push once  influenza   Vaccine 0.5 milliLiter(s) IntraMuscular once  insulin glargine Injectable (LANTUS) 20 Unit(s) SubCutaneous every morning  insulin lispro (HumaLOG) corrective regimen sliding scale   SubCutaneous three times a day before meals  insulin lispro (HumaLOG) corrective regimen sliding scale   SubCutaneous at bedtime  lactated ringers. 1000 milliLiter(s) (60 mL/Hr) IV Continuous <Continuous>  levothyroxine Injectable 87.5 MICROGram(s) IV Push at bedtime  methylPREDNISolone sodium succinate Injectable 12.5 milliGRAM(s) IV Push daily  metoprolol tartrate Injectable 2.5 milliGRAM(s) IV Push every 6 hours  pantoprazole  Injectable 40 milliGRAM(s) IV Push daily  piperacillin/tazobactam IVPB.. 3.375 Gram(s) IV Intermittent every 8 hours  primidone 50 milliGRAM(s) Oral at bedtime  risperiDONE  Disintegrating Tablet 0.25 milliGRAM(s) Oral two times a day  simvastatin 20 milliGRAM(s) Oral at bedtime  vancomycin  IVPB      vancomycin  IVPB 1250 milliGRAM(s) IV Intermittent every 12 hours Davis Kramer MD  Cell: [phone]  Pager: [phone]    S:  No overnight events.  Febrile yesterday so abx started.  Pt mental status improving  No acute complaints.    ROS limited by mental status.       PHYSICAL EXAM:   GENERAL: elderly female in NAD respiring on 3L NC.   HEENT: no scleral icterus, no head and neck lad   CHEST/LUNG: CTAB, no wheezing, rhonchi   HEART: RRR, No MGR  ABDOMEN: soft, nondistended, normoactive, nontender to palpation   SKIN: Old anterior abdominal wall wound appears chronic w/o s&s of infection (nonerythematous, no fluctuance)  NERVOUS SYSTEM: Alert & Oriented X 1-2 (knows this is a hospital); no facial asymmetry or facial droop, moving all 4 extremities.                             9.5    9.91  )-----------( 404      ( 14 Feb 2020 15:33 )             30.8     02-14    148<H>  |  107  |  17  ----------------------------<  123<H>  4.1   |  22  |  0.86    Ca    9.5      14 Feb 2020 15:33  Phos  2.8     02-14  Mg     2.2     02-14    TPro  7.4  /  Alb  4.2  /  TBili  0.2  /  DBili  x   /  AST  11  /  ALT  15  /  AlkPhos  74  02-14      CAPILLARY BLOOD GLUCOSE    POCT Blood Glucose.: 118 mg/dL (15 Feb 2020 07:34)  POCT Blood Glucose.: 79 mg/dL (14 Feb 2020 22:07)  POCT Blood Glucose.: 124 mg/dL (14 Feb 2020 17:39)  POCT Blood Glucose.: 172 mg/dL (14 Feb 2020 11:47)      MEDICATIONS  (STANDING):  acetaminophen  IVPB .. 1000 milliGRAM(s) IV Intermittent once  budesonide  80 MICROgram(s)/formoterol 4.5 MICROgram(s) Inhaler 2 Puff(s) Inhalation two times a day  dextrose 5%. 1000 milliLiter(s) (50 mL/Hr) IV Continuous <Continuous>  dextrose 50% Injectable 12.5 Gram(s) IV Push once  dextrose 50% Injectable 25 Gram(s) IV Push once  dextrose 50% Injectable 25 Gram(s) IV Push once  influenza   Vaccine 0.5 milliLiter(s) IntraMuscular once  insulin glargine Injectable (LANTUS) 20 Unit(s) SubCutaneous every morning  insulin lispro (HumaLOG) corrective regimen sliding scale   SubCutaneous three times a day before meals  insulin lispro (HumaLOG) corrective regimen sliding scale   SubCutaneous at bedtime  lactated ringers. 1000 milliLiter(s) (60 mL/Hr) IV Continuous <Continuous>  levothyroxine Injectable 87.5 MICROGram(s) IV Push at bedtime  methylPREDNISolone sodium succinate Injectable 12.5 milliGRAM(s) IV Push daily  metoprolol tartrate Injectable 2.5 milliGRAM(s) IV Push every 6 hours  pantoprazole  Injectable 40 milliGRAM(s) IV Push daily  piperacillin/tazobactam IVPB.. 3.375 Gram(s) IV Intermittent every 8 hours  primidone 50 milliGRAM(s) Oral at bedtime  risperiDONE  Disintegrating Tablet 0.25 milliGRAM(s) Oral two times a day  simvastatin 20 milliGRAM(s) Oral at bedtime  vancomycin  IVPB      vancomycin  IVPB 1250 milliGRAM(s) IV Intermittent every 12 hours

## 2020-02-15 NOTE — PROGRESS NOTE ADULT - SUBJECTIVE AND OBJECTIVE BOX
S: Niece at bedside. Has been eating.     O:   VS Vital Signs Last 24 Hrs  T(C): 37.3 (15 Feb 2020 14:31), Max: 37.3 (15 Feb 2020 12:42)  T(F): 99.1 (15 Feb 2020 14:31), Max: 99.2 (15 Feb 2020 12:42)  HR: 90 (15 Feb 2020 14:31) (82 - 106)  BP: 144/88 (15 Feb 2020 14:31) (124/75 - 154/81)  BP(mean): --  RR: 18 (15 Feb 2020 14:31) (18 - 20)  SpO2: 100% (15 Feb 2020 14:31) (90% - 100%)    Labs:                       9.6    10.09 )-----------( 409      ( 15 Feb 2020 11:40 )             33.2   02-15    141  |  102  |  15  ----------------------------<  190<H>  4.5   |  20<L>  |  0.92    Ca    9.3      15 Feb 2020 11:40  Phos  3.0     02-15  Mg     2.1     02-15    TPro  7.2  /  Alb  3.9  /  TBili  0.2  /  DBili  x   /  AST  12  /  ALT  16  /  AlkPhos  72  02-15    Gen: awake  alert, well-developed, well-nourished, well-appearing in NAD   Skin: an-icteric, no rashes,   HEENT: Head is normocephalic/atraumatic. O/P normal with moist mucous membranes, no lesions, no bleeding.     NEURO:  MS: awake, alert,  not responding verbally, smiles, cooperative,  followed command to show 2 fingers and thumb on the right side. Started dancing at one point.   CN: PERRLA, EOMI, blinks to threat bilat, tracks, face is symmetric  MOTOR: at lest antigravity in all ext. Normal tone and muscle mass of all four extremities.   DTR: 1+ b/l in BR, biceps, patellar.   SENSATION: no deficits in light touch

## 2020-02-15 NOTE — PROGRESS NOTE ADULT - PROBLEM SELECTOR PLAN 3
Patient w/ anterior abdominal wound from SBO revision. Does not appear infected.   - wound care following; change dressing every 7 days; changed 2/6, 2/13  - wound base clean w/o infection   - CTM for s&s of infection

## 2020-02-16 DIAGNOSIS — N39.0 URINARY TRACT INFECTION, SITE NOT SPECIFIED: ICD-10-CM

## 2020-02-16 LAB
-  AMIKACIN: SIGNIFICANT CHANGE UP
-  AMPICILLIN/SULBACTAM: SIGNIFICANT CHANGE UP
-  AMPICILLIN: SIGNIFICANT CHANGE UP
-  AZTREONAM: SIGNIFICANT CHANGE UP
-  CEFAZOLIN: SIGNIFICANT CHANGE UP
-  CEFEPIME: SIGNIFICANT CHANGE UP
-  CEFOXITIN: SIGNIFICANT CHANGE UP
-  CEFTRIAXONE: SIGNIFICANT CHANGE UP
-  CIPROFLOXACIN: SIGNIFICANT CHANGE UP
-  GENTAMICIN: SIGNIFICANT CHANGE UP
-  IMIPENEM: SIGNIFICANT CHANGE UP
-  LEVOFLOXACIN: SIGNIFICANT CHANGE UP
-  MEROPENEM: SIGNIFICANT CHANGE UP
-  NITROFURANTOIN: SIGNIFICANT CHANGE UP
-  PIPERACILLIN/TAZOBACTAM: SIGNIFICANT CHANGE UP
-  TIGECYCLINE: SIGNIFICANT CHANGE UP
-  TOBRAMYCIN: SIGNIFICANT CHANGE UP
-  TRIMETHOPRIM/SULFAMETHOXAZOLE: SIGNIFICANT CHANGE UP
ALBUMIN SERPL ELPH-MCNC: 3.8 G/DL — SIGNIFICANT CHANGE UP (ref 3.3–5)
ALP SERPL-CCNC: 76 U/L — SIGNIFICANT CHANGE UP (ref 40–120)
ALT FLD-CCNC: 17 U/L — SIGNIFICANT CHANGE UP (ref 10–45)
ANION GAP SERPL CALC-SCNC: 20 MMOL/L — HIGH (ref 5–17)
AST SERPL-CCNC: 16 U/L — SIGNIFICANT CHANGE UP (ref 10–40)
BASOPHILS # BLD AUTO: 0.05 K/UL — SIGNIFICANT CHANGE UP (ref 0–0.2)
BASOPHILS NFR BLD AUTO: 0.5 % — SIGNIFICANT CHANGE UP (ref 0–2)
BILIRUB SERPL-MCNC: 0.2 MG/DL — SIGNIFICANT CHANGE UP (ref 0.2–1.2)
BUN SERPL-MCNC: 13 MG/DL — SIGNIFICANT CHANGE UP (ref 7–23)
CALCIUM SERPL-MCNC: 9.2 MG/DL — SIGNIFICANT CHANGE UP (ref 8.4–10.5)
CHLORIDE SERPL-SCNC: 106 MMOL/L — SIGNIFICANT CHANGE UP (ref 96–108)
CO2 SERPL-SCNC: 18 MMOL/L — LOW (ref 22–31)
CREAT SERPL-MCNC: 0.91 MG/DL — SIGNIFICANT CHANGE UP (ref 0.5–1.3)
CULTURE RESULTS: SIGNIFICANT CHANGE UP
EOSINOPHIL # BLD AUTO: 0.07 K/UL — SIGNIFICANT CHANGE UP (ref 0–0.5)
EOSINOPHIL NFR BLD AUTO: 0.7 % — SIGNIFICANT CHANGE UP (ref 0–6)
GLUCOSE BLDC GLUCOMTR-MCNC: 118 MG/DL — HIGH (ref 70–99)
GLUCOSE BLDC GLUCOMTR-MCNC: 207 MG/DL — HIGH (ref 70–99)
GLUCOSE BLDC GLUCOMTR-MCNC: 211 MG/DL — HIGH (ref 70–99)
GLUCOSE BLDC GLUCOMTR-MCNC: 267 MG/DL — HIGH (ref 70–99)
GLUCOSE SERPL-MCNC: 184 MG/DL — HIGH (ref 70–99)
HCT VFR BLD CALC: 33.6 % — LOW (ref 34.5–45)
HGB BLD-MCNC: 10.1 G/DL — LOW (ref 11.5–15.5)
IMM GRANULOCYTES NFR BLD AUTO: 0.5 % — SIGNIFICANT CHANGE UP (ref 0–1.5)
LYMPHOCYTES # BLD AUTO: 1.52 K/UL — SIGNIFICANT CHANGE UP (ref 1–3.3)
LYMPHOCYTES # BLD AUTO: 15.1 % — SIGNIFICANT CHANGE UP (ref 13–44)
MAGNESIUM SERPL-MCNC: 2 MG/DL — SIGNIFICANT CHANGE UP (ref 1.6–2.6)
MCHC RBC-ENTMCNC: 30.1 GM/DL — LOW (ref 32–36)
MCHC RBC-ENTMCNC: 31 PG — SIGNIFICANT CHANGE UP (ref 27–34)
MCV RBC AUTO: 103.1 FL — HIGH (ref 80–100)
METHOD TYPE: SIGNIFICANT CHANGE UP
MONOCYTES # BLD AUTO: 0.47 K/UL — SIGNIFICANT CHANGE UP (ref 0–0.9)
MONOCYTES NFR BLD AUTO: 4.7 % — SIGNIFICANT CHANGE UP (ref 2–14)
NEUTROPHILS # BLD AUTO: 7.88 K/UL — HIGH (ref 1.8–7.4)
NEUTROPHILS NFR BLD AUTO: 78.5 % — HIGH (ref 43–77)
NRBC # BLD: 0 /100 WBCS — SIGNIFICANT CHANGE UP (ref 0–0)
ORGANISM # SPEC MICROSCOPIC CNT: SIGNIFICANT CHANGE UP
ORGANISM # SPEC MICROSCOPIC CNT: SIGNIFICANT CHANGE UP
PHOSPHATE SERPL-MCNC: 2.9 MG/DL — SIGNIFICANT CHANGE UP (ref 2.5–4.5)
PLATELET # BLD AUTO: 377 K/UL — SIGNIFICANT CHANGE UP (ref 150–400)
POTASSIUM SERPL-MCNC: 4.1 MMOL/L — SIGNIFICANT CHANGE UP (ref 3.5–5.3)
POTASSIUM SERPL-SCNC: 4.1 MMOL/L — SIGNIFICANT CHANGE UP (ref 3.5–5.3)
PROT SERPL-MCNC: 7.2 G/DL — SIGNIFICANT CHANGE UP (ref 6–8.3)
RBC # BLD: 3.26 M/UL — LOW (ref 3.8–5.2)
RBC # FLD: 14.5 % — SIGNIFICANT CHANGE UP (ref 10.3–14.5)
SODIUM SERPL-SCNC: 144 MMOL/L — SIGNIFICANT CHANGE UP (ref 135–145)
SPECIMEN SOURCE: SIGNIFICANT CHANGE UP
WBC # BLD: 10.04 K/UL — SIGNIFICANT CHANGE UP (ref 3.8–10.5)
WBC # FLD AUTO: 10.04 K/UL — SIGNIFICANT CHANGE UP (ref 3.8–10.5)

## 2020-02-16 PROCEDURE — 99232 SBSQ HOSP IP/OBS MODERATE 35: CPT | Mod: GC

## 2020-02-16 RX ADMIN — Medication 2.5 MILLIGRAM(S): at 00:37

## 2020-02-16 RX ADMIN — PIPERACILLIN AND TAZOBACTAM 25 GRAM(S): 4; .5 INJECTION, POWDER, LYOPHILIZED, FOR SOLUTION INTRAVENOUS at 06:43

## 2020-02-16 RX ADMIN — Medication 2: at 11:57

## 2020-02-16 RX ADMIN — INSULIN GLARGINE 20 UNIT(S): 100 INJECTION, SOLUTION SUBCUTANEOUS at 11:57

## 2020-02-16 RX ADMIN — BUDESONIDE AND FORMOTEROL FUMARATE DIHYDRATE 2 PUFF(S): 160; 4.5 AEROSOL RESPIRATORY (INHALATION) at 05:49

## 2020-02-16 RX ADMIN — PIPERACILLIN AND TAZOBACTAM 25 GRAM(S): 4; .5 INJECTION, POWDER, LYOPHILIZED, FOR SOLUTION INTRAVENOUS at 15:16

## 2020-02-16 RX ADMIN — PANTOPRAZOLE SODIUM 40 MILLIGRAM(S): 20 TABLET, DELAYED RELEASE ORAL at 11:58

## 2020-02-16 RX ADMIN — Medication 12.5 MILLIGRAM(S): at 06:43

## 2020-02-16 RX ADMIN — Medication 2.5 MILLIGRAM(S): at 17:44

## 2020-02-16 RX ADMIN — Medication 3: at 17:44

## 2020-02-16 RX ADMIN — Medication 2: at 00:38

## 2020-02-16 RX ADMIN — Medication 2.5 MILLIGRAM(S): at 06:42

## 2020-02-16 RX ADMIN — PIPERACILLIN AND TAZOBACTAM 25 GRAM(S): 4; .5 INJECTION, POWDER, LYOPHILIZED, FOR SOLUTION INTRAVENOUS at 22:56

## 2020-02-16 RX ADMIN — Medication 2.5 MILLIGRAM(S): at 11:58

## 2020-02-16 NOTE — PROGRESS NOTE ADULT - ATTENDING COMMENTS
Patient seen and examined today. I agree with the above findings, assessment, and plan with the following additions and exceptions:     Acute encephalopathy, unspecified.   No clear single etiology at this point in time. Likely multifactorial. Patient with severe hypothyroidism and SDH on imaging. Noted to have signs of AI at outside hospital. Anterior abd wound does not show signs of infection. Preliminary CSF autoimmune and paraneoplastic panel negative.   2/11 episodes of somnolence may be related to VPA however dose not particularly high given patients documented weight. VPA level low. NH3 low. Most recent episode of acute alteration in mental status improved after IV abx. UA positive. Follow up Ucx speciation. Bcx with ngtd.   Risperdal per psych however patient's HCP Norris refusing at this point in time who would like to discuss with Psychiatry first.   Endo recs appreciated for hypothyroidism and dm.   Extensive discussion had Norris and Heavenly (Co-HCPs and children) at bedside. I explained that disposition to a subacute rehab or to a long term care facility would be the safest option given the ancillary staff available at these facilities to help manage agitation and her medications. They are in agreement however they would like a facility that does not have limitations to visitation hours. Request communicated to case management.   Rest of plan as above.    Dr. Avery Dugan DO  Attending Physician  Division of Hospital Medicine  [facility]  Pager:  [phone]

## 2020-02-16 NOTE — PROGRESS NOTE ADULT - SUBJECTIVE AND OBJECTIVE BOX
***************************************************************  Dr. Kellie Bernal  Internal Medicine   [facility] Pager: [phone]  [facility] Pager: [phone]   ***************************************************************    AFTAB BASS  64y  MRN: [id number]    Subjective:    Patient is a 64y old  Female who presents with a chief complaint of change in mental status (15 Feb 2020 15:57)      Interval history/overnight events:    ON, family attempted to feed patient while she was NPO.    This AM, mental status much improved. BCx growing >100,000 GNR       MEDICATIONS  (STANDING):  acetaminophen  IVPB .. 1000 milliGRAM(s) IV Intermittent once  budesonide  80 MICROgram(s)/formoterol 4.5 MICROgram(s) Inhaler 2 Puff(s) Inhalation two times a day  dextrose 5%. 1000 milliLiter(s) (50 mL/Hr) IV Continuous <Continuous>  dextrose 50% Injectable 12.5 Gram(s) IV Push once  dextrose 50% Injectable 25 Gram(s) IV Push once  dextrose 50% Injectable 25 Gram(s) IV Push once  influenza   Vaccine 0.5 milliLiter(s) IntraMuscular once  insulin glargine Injectable (LANTUS) 20 Unit(s) SubCutaneous every morning  insulin lispro (HumaLOG) corrective regimen sliding scale   SubCutaneous every 6 hours  lactated ringers. 1000 milliLiter(s) (60 mL/Hr) IV Continuous <Continuous>  levothyroxine Injectable 87.5 MICROGram(s) IV Push at bedtime  methylPREDNISolone sodium succinate Injectable 12.5 milliGRAM(s) IV Push daily  metoprolol tartrate Injectable 2.5 milliGRAM(s) IV Push every 6 hours  pantoprazole  Injectable 40 milliGRAM(s) IV Push daily  piperacillin/tazobactam IVPB.. 3.375 Gram(s) IV Intermittent every 8 hours  primidone 50 milliGRAM(s) Oral at bedtime  simvastatin 20 milliGRAM(s) Oral at bedtime    MEDICATIONS  (PRN):  acetaminophen  Suppository .. 650 milliGRAM(s) Rectal every 6 hours PRN Temp greater or equal to 38C (100.4F)  albuterol/ipratropium for Nebulization 3 milliLiter(s) Nebulizer every 6 hours PRN Shortness of Breath and/or Wheezing  dextrose 40% Gel 15 Gram(s) Oral once PRN Blood Glucose LESS THAN 70 milliGRAM(s)/deciLiter  glucagon  Injectable 1 milliGRAM(s) IntraMuscular once PRN Glucose <70 milliGRAM(s)/deciLiter  ketorolac   Injectable 15 milliGRAM(s) IV Push every 6 hours PRN Moderate Pain (4 - 6)  melatonin 3 milliGRAM(s) Oral at bedtime PRN Insomnia  morphine  - Injectable 4 milliGRAM(s) IV Push every 6 hours PRN Moderate Pain (4 - 6)        Objective:    Vitals: Vital Signs Last 24 Hrs  T(C): 36.7 (20 @ 05:25), Max: 37.5 (02-15-20 @ 21:35)  T(F): 98.1 (20 @ 05:25), Max: 99.5 (02-15-20 @ 21:35)  HR: 95 (20 @ 05:51) (90 - 102)  BP: 134/58 (20 @ 05:25) (134/58 - 154/81)  BP(mean): --  RR: 18 (20 @ 05:25) (18 - 20)  SpO2: 95% (20 @ 05:51) (93% - 100%)            I&O's Summary    15 Feb 2020 07:01  -  2020 07:00  --------------------------------------------------------  IN: 180 mL / OUT: 925 mL / NET: -745 mL      LABS:    02-15    141  |  102  |  15  ----------------------------<  190<H>  4.5   |  20<L>  |  0.92  02-14    148<H>  |  107  |  17  ----------------------------<  123<H>  4.1   |  22  |  0.86  02-13    140  |  102  |  16  ----------------------------<  180<H>  4.7   |  20<L>  |  0.85    Ca    9.3      15 Feb 2020 11:40  Ca    9.5      2020 15:33  Ca    9.9      2020 10:53  Phos  3.0     02-15  Mg     2.1     02-15    TPro  7.2  /  Alb  3.9  /  TBili  0.2  /  DBili  x   /  AST  12  /  ALT  16  /  AlkPhos  72  02-15  TPro  7.4  /  Alb  4.2  /  TBili  0.2  /  DBili  x   /  AST  11  /  ALT  15  /  AlkPhos  74  02-14  TPro  8.0  /  Alb  4.1  /  TBili  0.1<L>  /  DBili  x   /  AST  17  /  ALT  18  /  AlkPhos  83  02-13                  Urinalysis Basic - ( 2020 14:49 )    Color: Yellow / Appearance: Clear / S.022 / pH: x  Gluc: x / Ketone: Moderate  / Bili: Negative / Urobili: Negative   Blood: x / Protein: 30 mg/dL / Nitrite: Positive   Leuk Esterase: Moderate / RBC: 1 /hpf / WBC 7 /HPF   Sq Epi: x / Non Sq Epi: 10 /hpf / Bacteria: Many                              9.6    10.09 )-----------( 409      ( 15 Feb 2020 11:40 )             33.2                         9.5    9.91  )-----------( 404      ( 2020 15:33 )             30.8                         10.1   10.06 )-----------( 388      ( 2020 10:53 )             33.3     CAPILLARY BLOOD GLUCOSE      POCT Blood Glucose.: 118 mg/dL (2020 06:41)  POCT Blood Glucose.: 211 mg/dL (2020 00:00)  POCT Blood Glucose.: 228 mg/dL (15 Feb 2020 21:17)  POCT Blood Glucose.: 104 mg/dL (15 Feb 2020 18:00)  POCT Blood Glucose.: 191 mg/dL (15 Feb 2020 11:48)          RADIOLOGY & ADDITIONAL TESTS:            Imaging Personally Reviewed:  [ ] YES  [ ] NO    Consultants involved in case:   Consultant(s) Notes Reviewed:  [ ] YES  [ ] NO:   Care Discussed with Consultants/Other Providers [ ] YES  [ ] NO ***************************************************************  Dr. Kellie Bernal  Internal Medicine   [facility] Pager: [phone]  [facility] Pager: [phone]   ***************************************************************    AFTAB BASS  64y  MRN: [id number]    Subjective:    Patient is a 64y old  Female who presents with a chief complaint of change in mental status (15 Feb 2020 15:57)      Interval history/overnight events:    ON, family attempted to feed patient while she was NPO.    This AM, mental status much improved. BCx growing >100,000 GNR     ROS limited by mental status.      MEDICATIONS  (STANDING):  acetaminophen  IVPB .. 1000 milliGRAM(s) IV Intermittent once  budesonide  80 MICROgram(s)/formoterol 4.5 MICROgram(s) Inhaler 2 Puff(s) Inhalation two times a day  dextrose 5%. 1000 milliLiter(s) (50 mL/Hr) IV Continuous <Continuous>  dextrose 50% Injectable 12.5 Gram(s) IV Push once  dextrose 50% Injectable 25 Gram(s) IV Push once  dextrose 50% Injectable 25 Gram(s) IV Push once  influenza   Vaccine 0.5 milliLiter(s) IntraMuscular once  insulin glargine Injectable (LANTUS) 20 Unit(s) SubCutaneous every morning  insulin lispro (HumaLOG) corrective regimen sliding scale   SubCutaneous every 6 hours  lactated ringers. 1000 milliLiter(s) (60 mL/Hr) IV Continuous <Continuous>  levothyroxine Injectable 87.5 MICROGram(s) IV Push at bedtime  methylPREDNISolone sodium succinate Injectable 12.5 milliGRAM(s) IV Push daily  metoprolol tartrate Injectable 2.5 milliGRAM(s) IV Push every 6 hours  pantoprazole  Injectable 40 milliGRAM(s) IV Push daily  piperacillin/tazobactam IVPB.. 3.375 Gram(s) IV Intermittent every 8 hours  primidone 50 milliGRAM(s) Oral at bedtime  simvastatin 20 milliGRAM(s) Oral at bedtime    MEDICATIONS  (PRN):  acetaminophen  Suppository .. 650 milliGRAM(s) Rectal every 6 hours PRN Temp greater or equal to 38C (100.4F)  albuterol/ipratropium for Nebulization 3 milliLiter(s) Nebulizer every 6 hours PRN Shortness of Breath and/or Wheezing  dextrose 40% Gel 15 Gram(s) Oral once PRN Blood Glucose LESS THAN 70 milliGRAM(s)/deciLiter  glucagon  Injectable 1 milliGRAM(s) IntraMuscular once PRN Glucose <70 milliGRAM(s)/deciLiter  ketorolac   Injectable 15 milliGRAM(s) IV Push every 6 hours PRN Moderate Pain (4 - 6)  melatonin 3 milliGRAM(s) Oral at bedtime PRN Insomnia  morphine  - Injectable 4 milliGRAM(s) IV Push every 6 hours PRN Moderate Pain (4 - 6)        Objective:    Vitals: Vital Signs Last 24 Hrs  T(C): 36.7 (20 @ 05:25), Max: 37.5 (02-15-20 @ 21:35)  T(F): 98.1 (20 @ 05:25), Max: 99.5 (02-15-20 @ 21:35)  HR: 95 (20 @ 05:51) (90 - 102)  BP: 134/58 (20 @ 05:25) (134/58 - 154/81)  BP(mean): --  RR: 18 (20 @ 05:25) (18 - 20)  SpO2: 95% (20 @ 05:51) (93% - 100%)            I&O's Summary    15 Feb 2020 07:01  -  2020 07:00  --------------------------------------------------------  IN: 180 mL / OUT: 925 mL / NET: -745 mL    PHYSICAL EXAM:   GENERAL: elderly female in NAD respiring on 3L NC.   HEENT: no scleral icterus, no head and neck lad   CHEST/LUNG: CTAB, no wheezing, rhonchi, no rales.  HEART: RRR, No MGR  ABDOMEN: soft, nondistended, normoactive, nontender to palpation   SKIN: Old anterior abdominal wall wound appears chronic w/o s&s of infection (nonerythematous, no fluctuance)  NERVOUS SYSTEM: Alert & Oriented X 1-2 (knows this is a hospital); no facial asymmetry or facial droop, moving all 4 extremities.     LABS:    02-15    141  |  102  |  15  ----------------------------<  190<H>  4.5   |  20<L>  |  0.92      148<H>  |  107  |  17  ----------------------------<  123<H>  4.1   |  22  |  0.86      140  |  102  |  16  ----------------------------<  180<H>  4.7   |  20<L>  |  0.85    Ca    9.3      15 Feb 2020 11:40  Ca    9.5      2020 15:33  Ca    9.9      2020 10:53  Phos  3.0     -15  Mg     2.1     02-15    TPro  7.2  /  Alb  3.9  /  TBili  0.2  /  DBili  x   /  AST  12  /  ALT  16  /  AlkPhos  72  -15  TPro  7.4  /  Alb  4.2  /  TBili  0.2  /  DBili  x   /  AST  11  /  ALT  15  /  AlkPhos  74  02-14  TPro  8.0  /  Alb  4.1  /  TBili  0.1<L>  /  DBili  x   /  AST  17  /  ALT  18  /  AlkPhos  83  13                  Urinalysis Basic - ( 2020 14:49 )    Color: Yellow / Appearance: Clear / S.022 / pH: x  Gluc: x / Ketone: Moderate  / Bili: Negative / Urobili: Negative   Blood: x / Protein: 30 mg/dL / Nitrite: Positive   Leuk Esterase: Moderate / RBC: 1 /hpf / WBC 7 /HPF   Sq Epi: x / Non Sq Epi: 10 /hpf / Bacteria: Many                              9.6    10.09 )-----------( 409      ( 15 Feb 2020 11:40 )             33.2                         9.5    9.91  )-----------( 404      ( 2020 15:33 )             30.8                         10.1   10.06 )-----------( 388      ( 2020 10:53 )             33.3     CAPILLARY BLOOD GLUCOSE      POCT Blood Glucose.: 118 mg/dL (2020 06:41)  POCT Blood Glucose.: 211 mg/dL (2020 00:00)  POCT Blood Glucose.: 228 mg/dL (15 Feb 2020 21:17)  POCT Blood Glucose.: 104 mg/dL (15 Feb 2020 18:00)  POCT Blood Glucose.: 191 mg/dL (15 Feb 2020 11:48)          RADIOLOGY & ADDITIONAL TESTS:            Imaging Personally Reviewed:  [ ] YES  [ ] NO    Consultants involved in case:   Consultant(s) Notes Reviewed:  [ ] YES  [ ] NO:   Care Discussed with Consultants/Other Providers [ ] YES  [ ] NO ***************************************************************  Dr. Kellie Bernal  Internal Medicine   [facility] Pager: [phone]  [facility] Pager: [phone]   ***************************************************************    AFTAB BASS  64y  MRN: [id number]    Subjective:    Patient is a 64y old  Female who presents with a chief complaint of change in mental status (15 Feb 2020 15:57)      Interval history/overnight events:    ON, family attempted to feed patient while she was NPO.    This AM, mental status much improved. BCx growing >100,000 GNR.     ROS limited by mental status.      MEDICATIONS  (STANDING):  acetaminophen  IVPB .. 1000 milliGRAM(s) IV Intermittent once  budesonide  80 MICROgram(s)/formoterol 4.5 MICROgram(s) Inhaler 2 Puff(s) Inhalation two times a day  dextrose 5%. 1000 milliLiter(s) (50 mL/Hr) IV Continuous <Continuous>  dextrose 50% Injectable 12.5 Gram(s) IV Push once  dextrose 50% Injectable 25 Gram(s) IV Push once  dextrose 50% Injectable 25 Gram(s) IV Push once  influenza   Vaccine 0.5 milliLiter(s) IntraMuscular once  insulin glargine Injectable (LANTUS) 20 Unit(s) SubCutaneous every morning  insulin lispro (HumaLOG) corrective regimen sliding scale   SubCutaneous every 6 hours  lactated ringers. 1000 milliLiter(s) (60 mL/Hr) IV Continuous <Continuous>  levothyroxine Injectable 87.5 MICROGram(s) IV Push at bedtime  methylPREDNISolone sodium succinate Injectable 12.5 milliGRAM(s) IV Push daily  metoprolol tartrate Injectable 2.5 milliGRAM(s) IV Push every 6 hours  pantoprazole  Injectable 40 milliGRAM(s) IV Push daily  piperacillin/tazobactam IVPB.. 3.375 Gram(s) IV Intermittent every 8 hours  primidone 50 milliGRAM(s) Oral at bedtime  simvastatin 20 milliGRAM(s) Oral at bedtime    MEDICATIONS  (PRN):  acetaminophen  Suppository .. 650 milliGRAM(s) Rectal every 6 hours PRN Temp greater or equal to 38C (100.4F)  albuterol/ipratropium for Nebulization 3 milliLiter(s) Nebulizer every 6 hours PRN Shortness of Breath and/or Wheezing  dextrose 40% Gel 15 Gram(s) Oral once PRN Blood Glucose LESS THAN 70 milliGRAM(s)/deciLiter  glucagon  Injectable 1 milliGRAM(s) IntraMuscular once PRN Glucose <70 milliGRAM(s)/deciLiter  ketorolac   Injectable 15 milliGRAM(s) IV Push every 6 hours PRN Moderate Pain (4 - 6)  melatonin 3 milliGRAM(s) Oral at bedtime PRN Insomnia  morphine  - Injectable 4 milliGRAM(s) IV Push every 6 hours PRN Moderate Pain (4 - 6)        Objective:    Vitals: Vital Signs Last 24 Hrs  T(C): 36.7 (20 @ 05:25), Max: 37.5 (02-15-20 @ 21:35)  T(F): 98.1 (20 @ 05:25), Max: 99.5 (02-15-20 @ 21:35)  HR: 95 (20 @ 05:51) (90 - 102)  BP: 134/58 (20 @ 05:25) (134/58 - 154/81)  BP(mean): --  RR: 18 (20 @ 05:25) (18 - 20)  SpO2: 95% (20 @ 05:51) (93% - 100%)            I&O's Summary    15 Feb 2020 07:01  -  2020 07:00  --------------------------------------------------------  IN: 180 mL / OUT: 925 mL / NET: -745 mL    PHYSICAL EXAM:  GENERAL: elderly female in NAD respiring on 3L NC, awake and alert, not verbalizing but shaking head "yes" and "no"  HEENT: PERRL, no scleral icterus, no head and neck lad   CHEST/LUNG: CTAB, no wheezing, rhonchi   HEART: RRR, No MGR  ABDOMEN: soft, nondistended, normoactive, nontender to palpation   SKIN: Old anterior abdominal wall wound appears chronic w/o s&s of infection (nonerythematous, no fluctuance)  NERVOUS SYSTEM: Alert & Oriented X 0; PERRL. no facial asymmetry or facial droop, moving upper extremity bl, resisting eye exam, withdrawing from pain on all extremities. Brachial and patellar reflexes 1/4 b/l. Babinski downgoing. Some cogwheel rigidity of RUE, bl tremors.   PSYCH: calm, noncooperative     LABS:    02-15    141  |  102  |  15  ----------------------------<  190<H>  4.5   |  20<L>  |  0.92      148<H>  |  107  |  17  ----------------------------<  123<H>  4.1   |  22  |  0.86      140  |  102  |  16  ----------------------------<  180<H>  4.7   |  20<L>  |  0.85    Ca    9.3      15 Feb 2020 11:40  Ca    9.5      2020 15:33  Ca    9.9      2020 10:53  Phos  3.0     02-15  Mg     2.1     02-15    TPro  7.2  /  Alb  3.9  /  TBili  0.2  /  DBili  x   /  AST  12  /  ALT  16  /  AlkPhos  72  15  TPro  7.4  /  Alb  4.2  /  TBili  0.2  /  DBili  x   /  AST  11  /  ALT  15  /  AlkPhos  74  -14  TPro  8.0  /  Alb  4.1  /  TBili  0.1<L>  /  DBili  x   /  AST  17  /  ALT  18  /  AlkPhos  83                    Urinalysis Basic - ( 2020 14:49 )    Color: Yellow / Appearance: Clear / S.022 / pH: x  Gluc: x / Ketone: Moderate  / Bili: Negative / Urobili: Negative   Blood: x / Protein: 30 mg/dL / Nitrite: Positive   Leuk Esterase: Moderate / RBC: 1 /hpf / WBC 7 /HPF   Sq Epi: x / Non Sq Epi: 10 /hpf / Bacteria: Many                              9.6    10.09 )-----------( 409      ( 15 Feb 2020 11:40 )             33.2                         9.5    9.91  )-----------( 404      ( 2020 15:33 )             30.8                         10.1   10.06 )-----------( 388      ( 2020 10:53 )             33.3     CAPILLARY BLOOD GLUCOSE      POCT Blood Glucose.: 118 mg/dL (2020 06:41)  POCT Blood Glucose.: 211 mg/dL (2020 00:00)  POCT Blood Glucose.: 228 mg/dL (15 Feb 2020 21:17)  POCT Blood Glucose.: 104 mg/dL (15 Feb 2020 18:00)  POCT Blood Glucose.: 191 mg/dL (15 Feb 2020 11:48)          RADIOLOGY & ADDITIONAL TESTS:            Imaging Personally Reviewed:  [ ] YES  [ ] NO    Consultants involved in case:   Consultant(s) Notes Reviewed:  [ ] YES  [ ] NO:   Care Discussed with Consultants/Other Providers [ ] YES  [ ] NO

## 2020-02-16 NOTE — PROGRESS NOTE ADULT - PROBLEM SELECTOR PLAN 5
high potassium at clinic. pt sent in. pt has edema in b/l lower extremities On met ER 50 at home  - c/w metoprolol 2.5 q6hrs  - CTM bp -  with slight AG (18), BHB 4 (elevated) on presentation; hospital course complicated by FBGs in 300s; then hypoglycemia likely due to poor PO intake given altered mentation   - holding lantus 34 + Humalog 9 TID with meals as patient not currently eating at bl  - will c/w lantus 20 as patient w/ lower PO intake due to AMS; holding premeal coverage, c/w mod ISS  - FBGs controlled   - reintroduce pre-meal coverage as mentation improves -  with slight AG (18), BHB 4 (elevated) on presentation; hospital course complicated by FBGs in 300s; then hypoglycemia likely due to poor PO intake given altered mentation   - holding lantus 34 + Humalog 9 TID with meals as patient not currently eating at bl  - will c/w lantus 20 as patient w/ lower PO intake due to AMS; holding premeal coverage, c/w ISS  - FBGs controlled   - reintroduce pre-meal coverage as mentation improves

## 2020-02-16 NOTE — PROGRESS NOTE ADULT - PROBLEM SELECTOR PLAN 7
- prednisone 15 mg switched to methylprednisolone IV  - Tramadol PRN - Bcx + for GPR on 1/12, 1 out of 2 bottles. +Bcx from 1/12 sent to [facility] laboratories, no further speciation available.   - Blood cx obtained on 1/25 no growth X 5 days. Repeat 2/14 NGTD.   - vancomycin started 2/14 d/michael as repeat BCx NGTD

## 2020-02-16 NOTE — PROGRESS NOTE ADULT - PROBLEM SELECTOR PLAN 10
Transitions of Care Status:  1.  Name of PCP:  Kristie Nails MD (PCP)  2.  PCP Contacted on Admission: [ ] Y    [x] N    3.  PCP contacted at Discharge: [ ] Y    [ ] N    [ ] N/A  4.  Post-Discharge Appointment Date and Location:  5.  Summary of Handoff given to PCP: - DVT ppx: SWITCHED TO SCDS GIVEN BLEED  - Diet: DASH diet w/evening snack when patient more awake; maintenance LR x 16 hrs   - Dispo: pending PT    - FYI holding primidone, simvastatin, gabapentin as of 2/12 as no IV equivalent available   - ADDENDUM: Spoke to pts family members about decision making.  The children agree that Norris will serve as the point of  and will be the final decision maker representing the family for when decisions have to be made.  They are in agreement with moving forward with sedation/potential intubation for pt to undergo further medical testing. Discussions had on 2/3/20. - Davis Kramer  Transitions of Care Status:  1.  Name of PCP:  Kristei Nails MD (PCP)  2.  PCP Contacted on Admission: [ ] Y    [x] N    3.  PCP contacted at Discharge: [ ] Y    [ ] N    [ ] N/A  4.  Post-Discharge Appointment Date and Location:  5.  Summary of Handoff given to PCP:

## 2020-02-16 NOTE — PROGRESS NOTE ADULT - PROBLEM SELECTOR PLAN 1
Unclear etiology - possibly from 3mm right sided SDH sided on MRI brain; autoimmune encephalitis 2/2 longstanding RA less likely as MRI brain neg for inflammatory changes, from hypothyroidism less likely given correction of TSH/FT4 with minimal change in behavior vs. possible paraneoplastic syndrome, vs other psychiatric or infectious etiology. s/p LP 2/6 w/ sedation; CSF studies w/ 1 nucleated cell; gram stain w/ few PMNs, no orgs; CSF cultures NGTD; CSF PCR, cryptococcal antigen, HSV PCR, AFB smear, Borrelia ab, VDRL neg. Paraneoplastic panel, NMDR-Ab CSF, autoimmune panel ([place] IgM, IgG, dsDNA, anti-RNP, anti-ANF, anti-smith Ab) neg. s/p MRI 2/6 brain revealing for subacute 3mm right holohemisheric subdural hematoma   - Now w/ new change in mental status, increasing sedation 2/11. CTA head and neck w/out acute changes, CTV w/o thrombosis; possibly related to depakote sensitivity; depakote d/michael as son Norris has asked to hold further depakote as it has resulted in a change in mental status in his mother in the past lasting a few days and requiring hospitalization; r/o metabolic encephalopathy possibly from UTI  - qtc 452  - neurology f/u requested: [facility]   - psych f/u requested: as per psych attending, risperidone 0.25mg BID (ordered as disintegrating tablet), consider EEG   - rheumatology recs appreciated: less likely rheumatological origin as MRI brain neg for inflammatory changes; serum ALESSIA, ZAFAR, ribosomal P, dsdna neg, NMDRAb neg   - EEG ordered; pending   - f/u UA, UCx  - DVT ppx changed to SCDs given bleed Unclear etiology - possibly from 3mm right sided SDH sided on MRI brain; autoimmune encephalitis 2/2 longstanding RA less likely as MRI brain neg for inflammatory changes, from hypothyroidism less likely given correction of TSH/FT4 with minimal change in behavior vs. possible paraneoplastic syndrome, vs other psychiatric or infectious etiology. s/p LP 2/6 w/ sedation; CSF studies w/ 1 nucleated cell; gram stain w/ few PMNs, no orgs; CSF cultures NGTD; CSF PCR, cryptococcal antigen, HSV PCR, AFB smear, Borrelia ab, VDRL neg. Paraneoplastic panel, NMDR-Ab CSF, autoimmune panel ([place] IgM, IgG, dsDNA, anti-RNP, anti-ANF, anti-smith Ab) neg. s/p MRI 2/6 brain revealing for subacute 3mm right holohemisheric subdural hematoma    however seems highly unlikely that this would still be causing pts current status given level was checked and was <3 and in pts who are unable to break down depakote the ammonia level could be elevated but it was not; now appears to be metabolic from UTI; UCx w/ >100,000 GNR   - neurology f/u requested: [facility], likely frontotemporal dementia, consider amantadine 100 mg qday  - psych f/u requested: as per psych attending, risperidone 0.25mg BID (ordered as disintegrating tablet); however son refusing   - rheumatology recs appreciated: less likely rheumatological origin as MRI brain neg for inflammatory changes; serum ALESSIA, ZAFAR, ribosomal P, dsdna neg, NMDRAb neg   - EEG ordered; pending   - DVT ppx changed to SCDs given bleed Unclear etiology - possibly from 3mm right sided SDH sided on MRI brain; autoimmune encephalitis 2/2 longstanding RA less likely as MRI brain neg for inflammatory changes, from hypothyroidism less likely given correction of TSH/FT4 with minimal change in behavior vs. possible paraneoplastic syndrome, vs other psychiatric or infectious etiology. s/p LP 2/6 w/ sedation; CSF studies w/ 1 nucleated cell; gram stain w/ few PMNs, no orgs; CSF cultures NGTD; CSF PCR, cryptococcal antigen, HSV PCR, AFB smear, Borrelia ab, VDRL neg. Paraneoplastic panel, NMDR-Ab CSF, autoimmune panel ([place] IgM, IgG, dsDNA, anti-RNP, anti-ANF, anti-smith Ab) neg. s/p MRI 2/6 brain revealing for subacute 3mm right holohemisheric subdural hematoma    however seems highly unlikely that this would still be causing pts current status given level was checked and was <3 and in pts who are unable to break down depakote the ammonia level could be elevated but it was not; now appears to be metabolic from UTI; UCx w/ >100,000 GNR   - neurology recs appreciated: [facility], likely frontotemporal dementia, consider amantadine 100 mg qday  - psych f/u requested: as per psych attending, risperidone 0.25mg BID (ordered as disintegrating tablet); however son refusing   - rheumatology recs appreciated: less likely rheumatological origin as MRI brain neg for inflammatory changes; serum ALESSIA, ZAFAR, ribosomal P, dsdna neg, NMDRAb neg   - EEG ordered; pending   - DVT ppx changed to SCDs given bleed

## 2020-02-16 NOTE — PROGRESS NOTE ADULT - PROBLEM SELECTOR PLAN 3
Patient w/ anterior abdominal wound from SBO revision. Does not appear infected.   - wound care following; change dressing every 7 days; changed 2/6, 2/13  - wound base clean w/o infection   - CTM for s&s of infection -  on admission; 8.35 2/1; now 17.8   - anti-TPO elevated c/w autoimmune thyroiditis   - levothyroxine 175 mg changed to 87.5 mg IV; c/w current dose   - repeat TFTs in 4-6 weeks post d/c

## 2020-02-16 NOTE — PROGRESS NOTE ADULT - PROBLEM SELECTOR PLAN 9
- DVT ppx: SWITCHED TO SCDS GIVEN BLEED  - Diet: DASH diet w/evening snack when patient more awake; maintenance LR x 16 hrs   - Dispo: pending PT    - FYI holding primidone, simvastatin, gabapentin as of 2/12 as no IV equivalent available   - ADDENDUM: Spoke to pts family members about decision making.  The children agree that Norris will serve as the point of  and will be the final decision maker representing the family for when decisions have to be made.  They are in agreement with moving forward with sedation/potential intubation for pt to undergo further medical testing. Discussions had on 2/3/20. - Davis Kramer - Moderate pulmonary HTN per TTE (1/13)  - Per son, sildenafil was discontinued   - Resp status stable.

## 2020-02-16 NOTE — PROGRESS NOTE ADULT - PROBLEM SELECTOR PLAN 4
-  with slight AG (18), BHB 4 (elevated) on presentation; hospital course complicated by FBGs in 300s; then hypoglycemia likely due to poor PO intake given altered mentation   - holding lantus 34 + Humalog 9 TID with meals as patient not currently eating at bl  - will c/w lantus 20 as patient w/ lower PO intake due to AMS; holding and mod ISS  - FBGs controlled   - reintroduce pre-meal coverage as mentation improves Patient w/ anterior abdominal wound from SBO revision. Does not appear infected.   - wound care following; change dressing every 7 days; changed 2/6, 2/13  - wound base clean w/o infection   - CTM for s&s of infection

## 2020-02-16 NOTE — PROGRESS NOTE ADULT - PROBLEM SELECTOR PLAN 2
-  on admission; 8.35 2/1; now 17.8   - anti-TPO elevated c/w autoimmune thyroiditis   - levothyroxine 175 mg changed to 87.5 mg IV; c/w current dose   - repeat TFTs in 4-6 weeks post d/c Complicated UTI given AMS.  Patient meeting SIRS criteria on 2/14 w/ Tmax 100.5 and tachycardia. UA w/ post nitrites, LE, many bacteria. UCx growing >100,000 GNR.   - BCx ngtd   - continue w/ zosyn (2/14-)  - f/u UCx speciation   - bladder scan q6 to r/o retention

## 2020-02-16 NOTE — PROGRESS NOTE ADULT - PROBLEM SELECTOR PLAN 6
- Bcx + for GPR on 1/12, 1 out of 2 bottles   -+Bcx from 1/12 sent to [facility] laboratories, currently no results  - Blood cx obtained on 1/25 no growth X 5 days.  - Patient noted to have leukocytosis, but remains afebrile   - will monitor off abx for now as repeat cultures ngtd On met ER 50 at home  - c/w metoprolol 2.5 q6hrs  - CTM bp

## 2020-02-16 NOTE — PROGRESS NOTE ADULT - PROBLEM SELECTOR PLAN 8
- Moderate pulmonary HTN per TTE (1/13)  - Per son, sildenafil was discontinued   - Resp status stable. - prednisone 15 mg switched to methylprednisolone IV  - Tramadol PRN

## 2020-02-17 LAB
AMPA-R AB CBA, CSF: NEGATIVE — SIGNIFICANT CHANGE UP
AMPHIPHYSIN AB TITR CSF: NEGATIVE TITER — SIGNIFICANT CHANGE UP
ANION GAP SERPL CALC-SCNC: 18 MMOL/L — HIGH (ref 5–17)
BUN SERPL-MCNC: 8 MG/DL — SIGNIFICANT CHANGE UP (ref 7–23)
CALCIUM SERPL-MCNC: 9.2 MG/DL — SIGNIFICANT CHANGE UP (ref 8.4–10.5)
CASPR2-IGG CBA, CSF: NEGATIVE — SIGNIFICANT CHANGE UP
CHLORIDE SERPL-SCNC: 104 MMOL/L — SIGNIFICANT CHANGE UP (ref 96–108)
CO2 SERPL-SCNC: 21 MMOL/L — LOW (ref 22–31)
CREAT SERPL-MCNC: 0.87 MG/DL — SIGNIFICANT CHANGE UP (ref 0.5–1.3)
CV2 IGG TITR CSF: NEGATIVE TITER — SIGNIFICANT CHANGE UP
DPPX ANTIBODY IFA, CSF: NEGATIVE — SIGNIFICANT CHANGE UP
GABA-B-R AB CBA, CSF: NEGATIVE — SIGNIFICANT CHANGE UP
GAD65 AB CSF-SCNC: 0 NMOL/L — SIGNIFICANT CHANGE UP
GFAP IFA, CSF: NEGATIVE — SIGNIFICANT CHANGE UP
GLIAL NUC TYPE 1 AB TITR CSF: NEGATIVE TITER — SIGNIFICANT CHANGE UP
GLUCOSE BLDC GLUCOMTR-MCNC: 104 MG/DL — HIGH (ref 70–99)
GLUCOSE BLDC GLUCOMTR-MCNC: 125 MG/DL — HIGH (ref 70–99)
GLUCOSE BLDC GLUCOMTR-MCNC: 205 MG/DL — HIGH (ref 70–99)
GLUCOSE BLDC GLUCOMTR-MCNC: 79 MG/DL — SIGNIFICANT CHANGE UP (ref 70–99)
GLUCOSE SERPL-MCNC: 188 MG/DL — HIGH (ref 70–99)
HCT VFR BLD CALC: 35.2 % — SIGNIFICANT CHANGE UP (ref 34.5–45)
HGB BLD-MCNC: 10.8 G/DL — LOW (ref 11.5–15.5)
HU1 AB TITR CSF IF: NEGATIVE TITER — SIGNIFICANT CHANGE UP
HU2 AB TITR CSF IF: NEGATIVE TITER — SIGNIFICANT CHANGE UP
HU3 AB TITR CSF: NEGATIVE TITER — SIGNIFICANT CHANGE UP
IMMUNOLOGIST REVIEW: SIGNIFICANT CHANGE UP
LGI1-IGG CBA, CSF: NEGATIVE — SIGNIFICANT CHANGE UP
MAGNESIUM SERPL-MCNC: 2 MG/DL — SIGNIFICANT CHANGE UP (ref 1.6–2.6)
MCHC RBC-ENTMCNC: 30.7 GM/DL — LOW (ref 32–36)
MCHC RBC-ENTMCNC: 30.7 PG — SIGNIFICANT CHANGE UP (ref 27–34)
MCV RBC AUTO: 100 FL — SIGNIFICANT CHANGE UP (ref 80–100)
MGLUR1 AB IFA, CSF: NEGATIVE — SIGNIFICANT CHANGE UP
NMDA-R AB CBA, CSF: NEGATIVE — SIGNIFICANT CHANGE UP
NRBC # BLD: 0 /100 WBCS — SIGNIFICANT CHANGE UP (ref 0–0)
PCA-TR AB TITR CSF: NEGATIVE TITER — SIGNIFICANT CHANGE UP
PHOSPHATE SERPL-MCNC: 2.5 MG/DL — SIGNIFICANT CHANGE UP (ref 2.5–4.5)
PLATELET # BLD AUTO: 371 K/UL — SIGNIFICANT CHANGE UP (ref 150–400)
POTASSIUM SERPL-MCNC: 3.9 MMOL/L — SIGNIFICANT CHANGE UP (ref 3.5–5.3)
POTASSIUM SERPL-SCNC: 3.9 MMOL/L — SIGNIFICANT CHANGE UP (ref 3.5–5.3)
PURKINJE CELL CYTOPLASMIC AB TYPE 2: NEGATIVE TITER — SIGNIFICANT CHANGE UP
PURKINJE CELLS AB TITR CSF IF: NEGATIVE TITER — SIGNIFICANT CHANGE UP
RBC # BLD: 3.52 M/UL — LOW (ref 3.8–5.2)
RBC # FLD: 14.1 % — SIGNIFICANT CHANGE UP (ref 10.3–14.5)
REFLEX ADDED: SIGNIFICANT CHANGE UP
SODIUM SERPL-SCNC: 143 MMOL/L — SIGNIFICANT CHANGE UP (ref 135–145)
WBC # BLD: 10.86 K/UL — HIGH (ref 3.8–10.5)
WBC # FLD AUTO: 10.86 K/UL — HIGH (ref 3.8–10.5)

## 2020-02-17 PROCEDURE — 99232 SBSQ HOSP IP/OBS MODERATE 35: CPT | Mod: GC

## 2020-02-17 PROCEDURE — 99232 SBSQ HOSP IP/OBS MODERATE 35: CPT

## 2020-02-17 RX ORDER — PIPERACILLIN AND TAZOBACTAM 4; .5 G/20ML; G/20ML
3.38 INJECTION, POWDER, LYOPHILIZED, FOR SOLUTION INTRAVENOUS EVERY 8 HOURS
Refills: 0 | Status: DISCONTINUED | OUTPATIENT
Start: 2020-02-17 | End: 2020-02-18

## 2020-02-17 RX ORDER — INSULIN GLARGINE 100 [IU]/ML
17 INJECTION, SOLUTION SUBCUTANEOUS EVERY MORNING
Refills: 0 | Status: DISCONTINUED | OUTPATIENT
Start: 2020-02-17 | End: 2020-02-18

## 2020-02-17 RX ADMIN — PIPERACILLIN AND TAZOBACTAM 25 GRAM(S): 4; .5 INJECTION, POWDER, LYOPHILIZED, FOR SOLUTION INTRAVENOUS at 22:11

## 2020-02-17 RX ADMIN — INSULIN GLARGINE 17 UNIT(S): 100 INJECTION, SOLUTION SUBCUTANEOUS at 12:48

## 2020-02-17 RX ADMIN — PANTOPRAZOLE SODIUM 40 MILLIGRAM(S): 20 TABLET, DELAYED RELEASE ORAL at 12:52

## 2020-02-17 RX ADMIN — Medication 2.5 MILLIGRAM(S): at 00:21

## 2020-02-17 RX ADMIN — Medication 87.5 MICROGRAM(S): at 00:17

## 2020-02-17 RX ADMIN — Medication 2.5 MILLIGRAM(S): at 12:52

## 2020-02-17 RX ADMIN — Medication 2.5 MILLIGRAM(S): at 18:35

## 2020-02-17 RX ADMIN — Medication 87.5 MICROGRAM(S): at 22:11

## 2020-02-17 RX ADMIN — Medication 12.5 MILLIGRAM(S): at 05:47

## 2020-02-17 RX ADMIN — Medication 2.5 MILLIGRAM(S): at 05:46

## 2020-02-17 RX ADMIN — PIPERACILLIN AND TAZOBACTAM 25 GRAM(S): 4; .5 INJECTION, POWDER, LYOPHILIZED, FOR SOLUTION INTRAVENOUS at 15:33

## 2020-02-17 RX ADMIN — Medication 2: at 12:46

## 2020-02-17 RX ADMIN — PIPERACILLIN AND TAZOBACTAM 25 GRAM(S): 4; .5 INJECTION, POWDER, LYOPHILIZED, FOR SOLUTION INTRAVENOUS at 05:47

## 2020-02-17 NOTE — PROGRESS NOTE ADULT - PROBLEM SELECTOR PLAN 2
Complicated UTI given AMS.  Patient meeting SIRS criteria on 2/14 w/ Tmax 100.5 and tachycardia. UA w/ post nitrites, LE, many bacteria. UCx growing >100,000 GNR.   - BCx ngtd   - continue w/ zosyn (2/14-)  - f/u UCx speciation   - bladder scan q6 to r/o retention Complicated UTI given AMS.  Patient meeting SIRS criteria on 2/14 w/ Tmax 100.5 and tachycardia. UA w/ post nitrites, LE, many bacteria. UCx growing >100,000 GNR.   - BCx ngtd   - zosyn (2/14-)  - UCx E.Coli sensitive to CTX

## 2020-02-17 NOTE — PROGRESS NOTE ADULT - SUBJECTIVE AND OBJECTIVE BOX
DIABETES FOLLOW UP NOTE: Saw pt earlier today  INTERVAL HX:63 y/o F w/h/o uncontrolled T2DM with unknown complications. Also h/o functional paraplegia, hypothyroidism, pulmonary hypertension, COPD, RA on chronic prednisone, SBO s/p ex lap with lysis of adhesions c/b multifocal pneumonia. Here with AMS/agitation. Pt still with AMS of unclear etiology and lack of capacity per mental health team. Also recent RRT with tachycardia, lethargy and no responding to verbal stimuli. Pt more alert now getting antibiotic for UTI . Pt still confused disoriented talking without purpose. Pt states she is eating everything but has been NPO since RRT. Glycemic control not at goal while on present insulin doses. No hypoglycemia but noted BG at 12mn 79 and 200s at 12 and 6pm. Getting antibiotic in D5 infusion. Off IV fluids and remains on IV synthroid and Methylprednisolone.        Review of Systems:  Unable to assess.     Allergies    Depakote (Other)    Intolerances    Seroquel (Other)    MEDICATIONS:  insulin glargine Injectable (LANTUS) 20nit(s) SubCutaneous every morning  insulin lispro (HumaLOG) corrective regimen sliding scale   SubCutaneous every 6 hours  levothyroxine Injectable 87.5 MICROGram(s) IV Push at bedtime  methylPREDNISolone sodium succinate Injectable 12.5 milliGRAM(s) IV Push daily  piperacillin/tazobactam IVPB.. 3.375 Gram(s) IV Intermittent every 8 hours  simvastatin 20 milliGRAM(s) Oral at bedtime      PHYSICAL EXAM:  VITALS: T(C): 37.1 (02-17-20 @ 14:18)  T(F): 98.8 (02-17-20 @ 14:18), Max: 99.3 (02-17-20 @ 05:19)  HR: 94 (02-17-20 @ 14:18) (77 - 106)  BP: 136/81 (02-17-20 @ 14:18) (136/81 - 160/90)  RR:  (18 - 20)  SpO2:  (93% - 97%)  Wt(kg): --  GENERAL: Female laying in bed in NAD  Abdomen: Soft, nontender, non distended. obese  Extremities: Warm, no edema in all 4 exts  NEURO: Alert but confused disoriented.     LABS:  POCT Blood Glucose.: 205 mg/dL (02-17-20 @ 12:01)  POCT Blood Glucose.: 104 mg/dL (02-17-20 @ 06:03)  POCT Blood Glucose.: 79 mg/dL (02-17-20 @ 00:24)  POCT Blood Glucose.: 267 mg/dL (02-16-20 @ 17:17)  POCT Blood Glucose.: 207 mg/dL (02-16-20 @ 11:46)  POCT Blood Glucose.: 118 mg/dL (02-16-20 @ 06:41)  POCT Blood Glucose.: 211 mg/dL (02-16-20 @ 00:00)  POCT Blood Glucose.: 228 mg/dL (02-15-20 @ 21:17)  POCT Blood Glucose.: 104 mg/dL (02-15-20 @ 18:00)  POCT Blood Glucose.: 191 mg/dL (02-15-20 @ 11:48)  POCT Blood Glucose.: 118 mg/dL (02-15-20 @ 07:34)  POCT Blood Glucose.: 79 mg/dL (02-14-20 @ 22:07)  POCT Blood Glucose.: 124 mg/dL (02-14-20 @ 17:39)                            10.8   10.86 )-----------( 371      ( 17 Feb 2020 09:56 )             35.2       02-17    143  |  104  |  8   ----------------------------<  188<H>  3.9   |  21<L>  |  0.87    EGFR if : 83  EGFR if non : 71    Ca    9.2      02-17  Mg     2.0     02-17  Phos  2.5     02-17    TPro  7.2  /  Alb  3.8  /  TBili  0.2  /  DBili  x   /  AST  16  /  ALT  17  /  AlkPhos  76  02-16      Thyroid Function Tests:  02-13 @ 09:05 TSH 17.80 FreeT4 -- T3 -- Anti TPO -- Anti Thyroglobulin Ab -- TSI --  02-01 @ 22:37 TSH 8.35 FreeT4 1.3 T3 -- Anti TPO -- Anti Thyroglobulin Ab -- TSI --      Hemoglobin A1C, Whole Blood: 9.0 % <H> [4.0 - 5.6] (01-28-20 @ 19:30)  Hemoglobin A1C, Whole Blood: 9.2 % <H> [4.0 - 5.6] (01-27-20 @ 17:36)      01-27 Chol 307<H> <H> HDL 50 Trig 352<H>

## 2020-02-17 NOTE — PROGRESS NOTE ADULT - PROBLEM SELECTOR PLAN 5
-  with slight AG (18), BHB 4 (elevated) on presentation; hospital course complicated by FBGs in 300s; then hypoglycemia likely due to poor PO intake given altered mentation   - holding lantus 34 + Humalog 9 TID with meals as patient not currently eating at bl  - will c/w lantus 20 as patient w/ lower PO intake due to AMS; holding premeal coverage, c/w ISS  - FBGs controlled   - reintroduce pre-meal coverage as mentation improves -BG 70's 1/16 in the setting of decreased PO intake. Failed bedside swallow.  -speech and swallow eval  -Lantus 34 --> 20 --> 18U  -holding pre-meal insulin  -c/w ISS

## 2020-02-17 NOTE — PROGRESS NOTE ADULT - ASSESSMENT
64F w/ a PMHx arthritis, hypothyroidism, pulmonary HTN, DM on insulin, HTN, COPD/CHRIS on noctournal CPAP and 3L home), RA on chronic prednisone, chronic tremors, SBO s/p ex lap with lysis of adhesions c/b evisceration 2/2 a coughing episode s/p repeat ex lap c/b multifocal pneumonia and hypercapnic respiratory failure requiring intubation 1/6/19, s/p bronch 1/9/19, and extubated on 1/24/19; further c/b Enterococci bacteremia), p/w agitation 3 weeks of unclear etiology, found to be significantly hypothyroid (), extremely combative and paranoid of family and healthcare staff, s/p MRI brain and LP found to have small right sided SDH, course c/b hyperglycemia; now w/ new change in mental status w/ repeat CT head w/o acute changes; CTA head and neck and CTV without any new changes. 64F w/ a PMHx arthritis, hypothyroidism, pulmonary HTN, DM on insulin, HTN, COPD/CHRIS on noctournal CPAP and 3L home), RA on chronic prednisone, chronic tremors, SBO s/p ex lap with lysis of adhesions c/b evisceration 2/2 a coughing episode s/p repeat ex lap c/b multifocal pneumonia and hypercapnic respiratory failure requiring intubation 1/6/19, s/p bronch 1/9/19, and extubated on 1/24/19; further c/b Enterococci bacteremia) p/w agitation 3 weeks of unclear etiology, found to be significantly hypothyroid (), extremely combative and paranoid of family and healthcare staff, s/p MRI brain and LP found to have small right sided SDH, course c/b hyperglycemia; now w/ new change in mental status w/ repeat CT head w/o acute changes; CTA head and neck and CTV without any new changes.

## 2020-02-17 NOTE — PROGRESS NOTE ADULT - PROBLEM SELECTOR PLAN 7
- Bcx + for GPR on 1/12, 1 out of 2 bottles. +Bcx from 1/12 sent to [facility] laboratories, no further speciation available.   - Blood cx obtained on 1/25 no growth X 5 days. Repeat 2/14 NGTD.   - vancomycin started 2/14 d/michael as repeat BCx NGTD

## 2020-02-17 NOTE — PROGRESS NOTE ADULT - ASSESSMENT
63 y/o F w/h/o uncontrolled T2DM with unknown complications. Also h/o functional paraplegia, hypothyroidism, pulmonary hypertension, COPD, RA on chronic prednisone, SBO s/p ex lap with lysis of adhesions c/b multifocal pneumonia. Here with AMS/agitation. Pt still with AMS of unclear etiology and lack of capacity per mental health team. Also recent RRT with tachycardia, lethargy and no responding to verbal stimuli. On antibiotic for UTI.  Pt still confused disoriented with glycemic control not at goal while on present insulin doses. Noted that antibiotic is given in D5 and also the effect of steroid are likely causing elevated BG levels during the day. However, need to decrease Lantus dose to prevent overnight/ early morning hypoglycemia.

## 2020-02-17 NOTE — PROGRESS NOTE ADULT - PROBLEM SELECTOR PLAN 2
-Noted TSH repeated but level is not reliable at this time since pt recently started Levothyroxine therapy and med was not given on an empty stomach most of the time due to AMS.    -C/w levothyroxine 87.5 MICROGram(s) IVP daily  -NO NEED TO RETEST TSH DURING THIS ADMISSION!  -Plan discussed with pt/team.  Contact info: [phone] (24/7). pager [phone]

## 2020-02-17 NOTE — PROGRESS NOTE ADULT - PROBLEM SELECTOR PLAN 1
-Test BG ac and hs  -Changed Lantus dose to 17 units q am  -c/w Humalog moderate correction scale since pt is on Prednisone.  -Might start low dose of 4 units premeal insulin once pt tolerating POs.  -Please consider changing antibiotic infusion to NS instead of D5    --Pt unable to care for self at this time. Pt will need to continue basal/bolus therapy upon discharge.  -Plan discussed with pt's team.  Contact info: [phone] (24/7). pager [phone].

## 2020-02-17 NOTE — PROGRESS NOTE ADULT - SUBJECTIVE AND OBJECTIVE BOX
***************************************************************  Logan Bryan, PGY1  Internal Medicine   pager: [phone]  ***************************************************************    AFTAB BASS  64y  MRN: [id number]    Patient is a 64y old  Female who presents with a chief complaint of change in mental status (16 Feb 2020 08:37)      Subjective: no events ON. Denies fever, CP, SOB, abn pain, N/V, dysuria. Tolerating diet.      MEDICATIONS  (STANDING):  acetaminophen  IVPB .. 1000 milliGRAM(s) IV Intermittent once  budesonide  80 MICROgram(s)/formoterol 4.5 MICROgram(s) Inhaler 2 Puff(s) Inhalation two times a day  dextrose 5%. 1000 milliLiter(s) (50 mL/Hr) IV Continuous <Continuous>  dextrose 50% Injectable 12.5 Gram(s) IV Push once  dextrose 50% Injectable 25 Gram(s) IV Push once  dextrose 50% Injectable 25 Gram(s) IV Push once  influenza   Vaccine 0.5 milliLiter(s) IntraMuscular once  insulin glargine Injectable (LANTUS) 20 Unit(s) SubCutaneous every morning  insulin lispro (HumaLOG) corrective regimen sliding scale   SubCutaneous every 6 hours  lactated ringers. 1000 milliLiter(s) (60 mL/Hr) IV Continuous <Continuous>  levothyroxine Injectable 87.5 MICROGram(s) IV Push at bedtime  methylPREDNISolone sodium succinate Injectable 12.5 milliGRAM(s) IV Push daily  metoprolol tartrate Injectable 2.5 milliGRAM(s) IV Push every 6 hours  pantoprazole  Injectable 40 milliGRAM(s) IV Push daily  piperacillin/tazobactam IVPB.. 3.375 Gram(s) IV Intermittent every 8 hours  primidone 50 milliGRAM(s) Oral at bedtime  simvastatin 20 milliGRAM(s) Oral at bedtime    MEDICATIONS  (PRN):  acetaminophen  Suppository .. 650 milliGRAM(s) Rectal every 6 hours PRN Temp greater or equal to 38C (100.4F)  albuterol/ipratropium for Nebulization 3 milliLiter(s) Nebulizer every 6 hours PRN Shortness of Breath and/or Wheezing  dextrose 40% Gel 15 Gram(s) Oral once PRN Blood Glucose LESS THAN 70 milliGRAM(s)/deciLiter  glucagon  Injectable 1 milliGRAM(s) IntraMuscular once PRN Glucose <70 milliGRAM(s)/deciLiter  ketorolac   Injectable 15 milliGRAM(s) IV Push every 6 hours PRN Moderate Pain (4 - 6)  melatonin 3 milliGRAM(s) Oral at bedtime PRN Insomnia  morphine  - Injectable 4 milliGRAM(s) IV Push every 6 hours PRN Moderate Pain (4 - 6)      Objective:    Vitals: Vital Signs Last 24 Hrs  T(C): 37.4 (02-17-20 @ 05:19), Max: 37.4 (02-17-20 @ 05:19)  T(F): 99.3 (02-17-20 @ 05:19), Max: 99.3 (02-17-20 @ 05:19)  HR: 96 (02-17-20 @ 05:19) (77 - 106)  BP: 160/90 (02-17-20 @ 05:19) (115/83 - 160/90)  BP(mean): --  RR: 18 (02-17-20 @ 05:19) (18 - 20)  SpO2: 97% (02-17-20 @ 05:19) (94% - 97%)            I&O's Summary    16 Feb 2020 07:01  -  17 Feb 2020 07:00  --------------------------------------------------------  IN: 200 mL / OUT: 725 mL / NET: -525 mL        PHYSICAL EXAM:  GENERAL: NAD  HEAD:  Atraumatic, Normocephalic  EYES: EOMI, conjunctiva and sclera clear  CHEST/LUNG: Clear to percussion bilaterally; No rales, rhonchi, wheezing, or rubs  HEART: Regular rate and rhythm; No murmurs, rubs, or gallops  ABDOMEN: Soft, Nontender, Nondistended;   SKIN: No rashes or lesions  NERVOUS SYSTEM:  Alert & Oriented X3, no focal deficit    LABS:  02-16    144  |  106  |  13  ----------------------------<  184<H>  4.1   |  18<L>  |  0.91  02-15    141  |  102  |  15  ----------------------------<  190<H>  4.5   |  20<L>  |  0.92  02-14    148<H>  |  107  |  17  ----------------------------<  123<H>  4.1   |  22  |  0.86    Ca    9.2      16 Feb 2020 10:29  Ca    9.3      15 Feb 2020 11:40  Ca    9.5      14 Feb 2020 15:33  Phos  2.9     02-16  Mg     2.0     02-16    TPro  7.2  /  Alb  3.8  /  TBili  0.2  /  DBili  x   /  AST  16  /  ALT  17  /  AlkPhos  76  02-16  TPro  7.2  /  Alb  3.9  /  TBili  0.2  /  DBili  x   /  AST  12  /  ALT  16  /  AlkPhos  72  02-15  TPro  7.4  /  Alb  4.2  /  TBili  0.2  /  DBili  x   /  AST  11  /  ALT  15  /  AlkPhos  74  02-14                                              10.1   10.04 )-----------( 377      ( 16 Feb 2020 10:29 )             33.6                         9.6    10.09 )-----------( 409      ( 15 Feb 2020 11:40 )             33.2                         9.5    9.91  )-----------( 404      ( 14 Feb 2020 15:33 )             30.8     CAPILLARY BLOOD GLUCOSE      POCT Blood Glucose.: 104 mg/dL (17 Feb 2020 06:03)  POCT Blood Glucose.: 79 mg/dL (17 Feb 2020 00:24)  POCT Blood Glucose.: 267 mg/dL (16 Feb 2020 17:17)  POCT Blood Glucose.: 207 mg/dL (16 Feb 2020 11:46)      RADIOLOGY & ADDITIONAL TESTS:    Imaging Personally Reviewed:  [x ] YES  [ ] NO    Consultants involved in case:   Consultant(s) Notes Reviewed:  [ x] YES  [ ] NO:   Care Discussed with Consultants/Other Providers [x ] YES  [ ] NO ***************************************************************  Logan Bryan, PGY1  Internal Medicine   pager: [phone]  ***************************************************************    AFTAB BASS  64y  MRN: [id number]    Patient is a 64y old  Female who presents with a chief complaint of change in mental status (16 Feb 2020 08:37)      Subjective: no events ON. This morning patient is agitated and talking incoherently. AO x 0, patient believes her name is "miss antoine" and she is at a family barbecue. Denies any symptoms such as CP, SOB, abdominal pain, n/v/d.   MEDICATIONS  (STANDING):  acetaminophen  IVPB .. 1000 milliGRAM(s) IV Intermittent once  budesonide  80 MICROgram(s)/formoterol 4.5 MICROgram(s) Inhaler 2 Puff(s) Inhalation two times a day  dextrose 5%. 1000 milliLiter(s) (50 mL/Hr) IV Continuous <Continuous>  dextrose 50% Injectable 12.5 Gram(s) IV Push once  dextrose 50% Injectable 25 Gram(s) IV Push once  dextrose 50% Injectable 25 Gram(s) IV Push once  influenza   Vaccine 0.5 milliLiter(s) IntraMuscular once  insulin glargine Injectable (LANTUS) 20 Unit(s) SubCutaneous every morning  insulin lispro (HumaLOG) corrective regimen sliding scale   SubCutaneous every 6 hours  lactated ringers. 1000 milliLiter(s) (60 mL/Hr) IV Continuous <Continuous>  levothyroxine Injectable 87.5 MICROGram(s) IV Push at bedtime  methylPREDNISolone sodium succinate Injectable 12.5 milliGRAM(s) IV Push daily  metoprolol tartrate Injectable 2.5 milliGRAM(s) IV Push every 6 hours  pantoprazole  Injectable 40 milliGRAM(s) IV Push daily  piperacillin/tazobactam IVPB.. 3.375 Gram(s) IV Intermittent every 8 hours  primidone 50 milliGRAM(s) Oral at bedtime  simvastatin 20 milliGRAM(s) Oral at bedtime    MEDICATIONS  (PRN):  acetaminophen  Suppository .. 650 milliGRAM(s) Rectal every 6 hours PRN Temp greater or equal to 38C (100.4F)  albuterol/ipratropium for Nebulization 3 milliLiter(s) Nebulizer every 6 hours PRN Shortness of Breath and/or Wheezing  dextrose 40% Gel 15 Gram(s) Oral once PRN Blood Glucose LESS THAN 70 milliGRAM(s)/deciLiter  glucagon  Injectable 1 milliGRAM(s) IntraMuscular once PRN Glucose <70 milliGRAM(s)/deciLiter  ketorolac   Injectable 15 milliGRAM(s) IV Push every 6 hours PRN Moderate Pain (4 - 6)  melatonin 3 milliGRAM(s) Oral at bedtime PRN Insomnia  morphine  - Injectable 4 milliGRAM(s) IV Push every 6 hours PRN Moderate Pain (4 - 6)      Objective:    Vitals: Vital Signs Last 24 Hrs  T(C): 37.4 (02-17-20 @ 05:19), Max: 37.4 (02-17-20 @ 05:19)  T(F): 99.3 (02-17-20 @ 05:19), Max: 99.3 (02-17-20 @ 05:19)  HR: 96 (02-17-20 @ 05:19) (77 - 106)  BP: 160/90 (02-17-20 @ 05:19) (115/83 - 160/90)  BP(mean): --  RR: 18 (02-17-20 @ 05:19) (18 - 20)  SpO2: 97% (02-17-20 @ 05:19) (94% - 97%)            I&O's Summary    16 Feb 2020 07:01  -  17 Feb 2020 07:00  --------------------------------------------------------  IN: 200 mL / OUT: 725 mL / NET: -525 mL      PHYSICAL EXAM:  GENERAL: agitated female on RA    HEENT: PERRL, no scleral icterus, no head and neck lad   CHEST/LUNG: CTAB, no wheezing, rhonchi   HEART: RRR no murmurs   ABDOMEN: soft, nondistended, normoactive, nontender to palpation, bandage clear, dry and intact   SKIN: Old anterior abdominal wall wound appears chronic w/o s&s of infection (nonerythematous, no fluctuance)  NERVOUS SYSTEM: Alert & Oriented X 0; PERRL. no facial asymmetry or facial droop, moving upper extremity bl, resisting eye exam, withdrawing from pain on all extremities. Brachial and patellar reflexes 1/4 b/l. Babinski downgoing. Some cogwheel rigidity of RUE, bl tremors.   PSYCH: Agitated with flight of ideas   LABS:  02-16    144  |  106  |  13  ----------------------------<  184<H>  4.1   |  18<L>  |  0.91  02-15    141  |  102  |  15  ----------------------------<  190<H>  4.5   |  20<L>  |  0.92  02-14    148<H>  |  107  |  17  ----------------------------<  123<H>  4.1   |  22  |  0.86    Ca    9.2      16 Feb 2020 10:29  Ca    9.3      15 Feb 2020 11:40  Ca    9.5      14 Feb 2020 15:33  Phos  2.9     02-16  Mg     2.0     02-16    TPro  7.2  /  Alb  3.8  /  TBili  0.2  /  DBili  x   /  AST  16  /  ALT  17  /  AlkPhos  76  02-16  TPro  7.2  /  Alb  3.9  /  TBili  0.2  /  DBili  x   /  AST  12  /  ALT  16  /  AlkPhos  72  02-15  TPro  7.4  /  Alb  4.2  /  TBili  0.2  /  DBili  x   /  AST  11  /  ALT  15  /  AlkPhos  74  02-14                                              10.1   10.04 )-----------( 377      ( 16 Feb 2020 10:29 )             33.6                         9.6    10.09 )-----------( 409      ( 15 Feb 2020 11:40 )             33.2                         9.5    9.91  )-----------( 404      ( 14 Feb 2020 15:33 )             30.8     CAPILLARY BLOOD GLUCOSE      POCT Blood Glucose.: 104 mg/dL (17 Feb 2020 06:03)  POCT Blood Glucose.: 79 mg/dL (17 Feb 2020 00:24)  POCT Blood Glucose.: 267 mg/dL (16 Feb 2020 17:17)  POCT Blood Glucose.: 207 mg/dL (16 Feb 2020 11:46)      RADIOLOGY & ADDITIONAL TESTS:    Imaging Personally Reviewed:  [x ] YES  [ ] NO    Consultants involved in case:   Consultant(s) Notes Reviewed:  [ x] YES  [ ] NO:   Care Discussed with Consultants/Other Providers [x ] YES  [ ] NO ***************************************************************  Logan Bryan, PGY1  Internal Medicine   pager: [phone]  ***************************************************************    AFTAB BASS  64y  MRN: [id number]    Patient is a 64y old  Female who presents with a chief complaint of change in mental status (16 Feb 2020 08:37)      Subjective: no events ON. This morning patient is agitated and talking incoherently. AO x 0, patient believes her name is "miss antoine" and she is at a family barbecue. Denies any symptoms such as CP, SOB, abdominal pain, n/v/d.   12 point ros negative except for above    MEDICATIONS  (STANDING):  acetaminophen  IVPB .. 1000 milliGRAM(s) IV Intermittent once  budesonide  80 MICROgram(s)/formoterol 4.5 MICROgram(s) Inhaler 2 Puff(s) Inhalation two times a day  dextrose 5%. 1000 milliLiter(s) (50 mL/Hr) IV Continuous <Continuous>  dextrose 50% Injectable 12.5 Gram(s) IV Push once  dextrose 50% Injectable 25 Gram(s) IV Push once  dextrose 50% Injectable 25 Gram(s) IV Push once  influenza   Vaccine 0.5 milliLiter(s) IntraMuscular once  insulin glargine Injectable (LANTUS) 20 Unit(s) SubCutaneous every morning  insulin lispro (HumaLOG) corrective regimen sliding scale   SubCutaneous every 6 hours  lactated ringers. 1000 milliLiter(s) (60 mL/Hr) IV Continuous <Continuous>  levothyroxine Injectable 87.5 MICROGram(s) IV Push at bedtime  methylPREDNISolone sodium succinate Injectable 12.5 milliGRAM(s) IV Push daily  metoprolol tartrate Injectable 2.5 milliGRAM(s) IV Push every 6 hours  pantoprazole  Injectable 40 milliGRAM(s) IV Push daily  piperacillin/tazobactam IVPB.. 3.375 Gram(s) IV Intermittent every 8 hours  primidone 50 milliGRAM(s) Oral at bedtime  simvastatin 20 milliGRAM(s) Oral at bedtime    MEDICATIONS  (PRN):  acetaminophen  Suppository .. 650 milliGRAM(s) Rectal every 6 hours PRN Temp greater or equal to 38C (100.4F)  albuterol/ipratropium for Nebulization 3 milliLiter(s) Nebulizer every 6 hours PRN Shortness of Breath and/or Wheezing  dextrose 40% Gel 15 Gram(s) Oral once PRN Blood Glucose LESS THAN 70 milliGRAM(s)/deciLiter  glucagon  Injectable 1 milliGRAM(s) IntraMuscular once PRN Glucose <70 milliGRAM(s)/deciLiter  ketorolac   Injectable 15 milliGRAM(s) IV Push every 6 hours PRN Moderate Pain (4 - 6)  melatonin 3 milliGRAM(s) Oral at bedtime PRN Insomnia  morphine  - Injectable 4 milliGRAM(s) IV Push every 6 hours PRN Moderate Pain (4 - 6)      Objective:    Vitals: Vital Signs Last 24 Hrs  T(C): 37.4 (02-17-20 @ 05:19), Max: 37.4 (02-17-20 @ 05:19)  T(F): 99.3 (02-17-20 @ 05:19), Max: 99.3 (02-17-20 @ 05:19)  HR: 96 (02-17-20 @ 05:19) (77 - 106)  BP: 160/90 (02-17-20 @ 05:19) (115/83 - 160/90)  BP(mean): --  RR: 18 (02-17-20 @ 05:19) (18 - 20)  SpO2: 97% (02-17-20 @ 05:19) (94% - 97%)            I&O's Summary    16 Feb 2020 07:01  -  17 Feb 2020 07:00  --------------------------------------------------------  IN: 200 mL / OUT: 725 mL / NET: -525 mL      PHYSICAL EXAM:  GENERAL: agitated female on RA    HEENT: PERRL, no scleral icterus, no head and neck lad   CHEST/LUNG: CTAB, no wheezing, rhonchi   HEART: RRR no murmurs   ABDOMEN: soft, nondistended, normoactive, nontender to palpation, bandage clear, dry and intact   SKIN: Old anterior abdominal wall wound appears chronic w/o s&s of infection (nonerythematous, no fluctuance)  NERVOUS SYSTEM: Alert & Oriented X 0; PERRL. no facial asymmetry or facial droop, moving upper extremity bl, resisting eye exam, withdrawing from pain on all extremities. Brachial and patellar reflexes 1/4 b/l. Babinski downgoing. Some cogwheel rigidity of b/l UEs, bl tremors of hands.      LABS:  02-16    144  |  106  |  13  ----------------------------<  184<H>  4.1   |  18<L>  |  0.91  02-15    141  |  102  |  15  ----------------------------<  190<H>  4.5   |  20<L>  |  0.92  02-14    148<H>  |  107  |  17  ----------------------------<  123<H>  4.1   |  22  |  0.86    Ca    9.2      16 Feb 2020 10:29  Ca    9.3      15 Feb 2020 11:40  Ca    9.5      14 Feb 2020 15:33  Phos  2.9     02-16  Mg     2.0     02-16    TPro  7.2  /  Alb  3.8  /  TBili  0.2  /  DBili  x   /  AST  16  /  ALT  17  /  AlkPhos  76  02-16  TPro  7.2  /  Alb  3.9  /  TBili  0.2  /  DBili  x   /  AST  12  /  ALT  16  /  AlkPhos  72  02-15  TPro  7.4  /  Alb  4.2  /  TBili  0.2  /  DBili  x   /  AST  11  /  ALT  15  /  AlkPhos  74  02-14                                              10.1   10.04 )-----------( 377      ( 16 Feb 2020 10:29 )             33.6                         9.6    10.09 )-----------( 409      ( 15 Feb 2020 11:40 )             33.2                         9.5    9.91  )-----------( 404      ( 14 Feb 2020 15:33 )             30.8     CAPILLARY BLOOD GLUCOSE      POCT Blood Glucose.: 104 mg/dL (17 Feb 2020 06:03)  POCT Blood Glucose.: 79 mg/dL (17 Feb 2020 00:24)  POCT Blood Glucose.: 267 mg/dL (16 Feb 2020 17:17)  POCT Blood Glucose.: 207 mg/dL (16 Feb 2020 11:46)      RADIOLOGY & ADDITIONAL TESTS:    Imaging Personally Reviewed:  [x ] YES  [ ] NO    Consultants involved in case:   Consultant(s) Notes Reviewed:  [ x] YES  [ ] NO:   Care Discussed with Consultants/Other Providers [x ] YES  [ ] NO

## 2020-02-17 NOTE — PROGRESS NOTE ADULT - PROBLEM SELECTOR PLAN 1
Unclear etiology - possibly from 3mm right sided SDH sided on MRI brain; autoimmune encephalitis 2/2 longstanding RA less likely as MRI brain neg for inflammatory changes, from hypothyroidism less likely given correction of TSH/FT4 with minimal change in behavior vs. possible paraneoplastic syndrome, vs other psychiatric or infectious etiology. s/p LP 2/6 w/ sedation; CSF studies w/ 1 nucleated cell; gram stain w/ few PMNs, no orgs; CSF cultures NGTD; CSF PCR, cryptococcal antigen, HSV PCR, AFB smear, Borrelia ab, VDRL neg. Paraneoplastic panel, NMDR-Ab CSF, autoimmune panel ([place] IgM, IgG, dsDNA, anti-RNP, anti-ANF, anti-smith Ab) neg. s/p MRI 2/6 brain revealing for subacute 3mm right holohemisheric subdural hematoma    however seems highly unlikely that this would still be causing pts current status given level was checked and was <3 and in pts who are unable to break down depakote the ammonia level could be elevated but it was not; now appears to be metabolic from UTI; UCx w/ >100,000 GNR   - neurology recs appreciated: veeg, likely frontotemporal dementia, consider amantadine 100 mg qday  - psych f/u requested: as per psych attending, risperidone 0.25mg BID (ordered as disintegrating tablet); however son refusing   - rheumatology recs appreciated: less likely rheumatological origin as MRI brain neg for inflammatory changes; serum ALESSIA, ZAFAR, ribosomal P, dsdna neg, NMDRAb neg   - EEG ordered; pending   - DVT ppx changed to SCDs given bleed

## 2020-02-17 NOTE — PROGRESS NOTE ADULT - ATTENDING COMMENTS
Patient seen and examined today. I agree with the above findings, assessment, and plan with the following additions and exceptions:     Acute encephalopathy, unspecified.   No clear single etiology at this point in time. Likely multifactorial. Patient with severe hypothyroidism and SDH on imaging. Noted to have signs of AI at outside hospital. Anterior abd wound does not show signs of infection. Preliminary CSF autoimmune and paraneoplastic panel negative.   2/11 episodes of somnolence may be related to VPA however dose not particularly high given patients documented weight. VPA level low. NH3 low. Most recent episode of acute alteration in mental status improved after IV abx. UA positive. Follow up Ucx speciation. Can switch to CTX. Bcx with ngtd.   Risperdal per psych however patient's HCP Norris refusing at this point in time who would like to discuss with Psychiatry first.   Endo recs appreciated for hypothyroidism and dm.   Extensive discussion had Norris and Heavenly (Co-HCPs and children) at bedside. I explained that disposition to a subacute rehab or to a long term care facility would be the safest option given the ancillary staff available at these facilities to help manage agitation and her medications. They are in agreement however they would like a facility that does not have limitations to visitation hours. Request communicated to case management.   Rest of plan as above.    Dr. Avery Dugan DO  Attending Physician  Division of Hospital Medicine  [facility]  Pager:  [phone]

## 2020-02-17 NOTE — PROGRESS NOTE ADULT - PROBLEM SELECTOR PLAN 10
- DVT ppx: SWITCHED TO SCDS GIVEN BLEED  - Diet: DASH diet w/evening snack when patient more awake; maintenance LR x 16 hrs   - Dispo: pending PT    - FYI holding primidone, simvastatin, gabapentin as of 2/12 as no IV equivalent available   - ADDENDUM: Spoke to pts family members about decision making.  The children agree that Norris will serve as the point of  and will be the final decision maker representing the family for when decisions have to be made.  They are in agreement with moving forward with sedation/potential intubation for pt to undergo further medical testing. Discussions had on 2/3/20. - Davis Kramer  Transitions of Care Status:  1.  Name of PCP:  Kristie Nails MD (PCP)  2.  PCP Contacted on Admission: [ ] Y    [x] N    3.  PCP contacted at Discharge: [ ] Y    [ ] N    [ ] N/A  4.  Post-Discharge Appointment Date and Location:  5.  Summary of Handoff given to PCP:

## 2020-02-18 DIAGNOSIS — E03.8 OTHER SPECIFIED HYPOTHYROIDISM: ICD-10-CM

## 2020-02-18 LAB
ALBUMIN SERPL ELPH-MCNC: 3.8 G/DL — SIGNIFICANT CHANGE UP (ref 3.3–5)
ALP SERPL-CCNC: 74 U/L — SIGNIFICANT CHANGE UP (ref 40–120)
ALT FLD-CCNC: 13 U/L — SIGNIFICANT CHANGE UP (ref 10–45)
ANION GAP SERPL CALC-SCNC: 18 MMOL/L — HIGH (ref 5–17)
AST SERPL-CCNC: 12 U/L — SIGNIFICANT CHANGE UP (ref 10–40)
BILIRUB SERPL-MCNC: 0.2 MG/DL — SIGNIFICANT CHANGE UP (ref 0.2–1.2)
BUN SERPL-MCNC: 8 MG/DL — SIGNIFICANT CHANGE UP (ref 7–23)
CALCIUM SERPL-MCNC: 9.2 MG/DL — SIGNIFICANT CHANGE UP (ref 8.4–10.5)
CHLORIDE SERPL-SCNC: 109 MMOL/L — HIGH (ref 96–108)
CO2 SERPL-SCNC: 21 MMOL/L — LOW (ref 22–31)
CREAT SERPL-MCNC: 0.89 MG/DL — SIGNIFICANT CHANGE UP (ref 0.5–1.3)
GLUCOSE BLDC GLUCOMTR-MCNC: 124 MG/DL — HIGH (ref 70–99)
GLUCOSE BLDC GLUCOMTR-MCNC: 150 MG/DL — HIGH (ref 70–99)
GLUCOSE BLDC GLUCOMTR-MCNC: 173 MG/DL — HIGH (ref 70–99)
GLUCOSE BLDC GLUCOMTR-MCNC: 185 MG/DL — HIGH (ref 70–99)
GLUCOSE BLDC GLUCOMTR-MCNC: 90 MG/DL — SIGNIFICANT CHANGE UP (ref 70–99)
GLUCOSE BLDC GLUCOMTR-MCNC: 99 MG/DL — SIGNIFICANT CHANGE UP (ref 70–99)
GLUCOSE SERPL-MCNC: 117 MG/DL — HIGH (ref 70–99)
HCT VFR BLD CALC: 34.5 % — SIGNIFICANT CHANGE UP (ref 34.5–45)
HGB BLD-MCNC: 10.4 G/DL — LOW (ref 11.5–15.5)
MAGNESIUM SERPL-MCNC: 2 MG/DL — SIGNIFICANT CHANGE UP (ref 1.6–2.6)
MCHC RBC-ENTMCNC: 30.1 GM/DL — LOW (ref 32–36)
MCHC RBC-ENTMCNC: 31 PG — SIGNIFICANT CHANGE UP (ref 27–34)
MCV RBC AUTO: 103 FL — HIGH (ref 80–100)
NRBC # BLD: 0 /100 WBCS — SIGNIFICANT CHANGE UP (ref 0–0)
PHOSPHATE SERPL-MCNC: 2.6 MG/DL — SIGNIFICANT CHANGE UP (ref 2.5–4.5)
PLATELET # BLD AUTO: 405 K/UL — HIGH (ref 150–400)
POTASSIUM SERPL-MCNC: 3.6 MMOL/L — SIGNIFICANT CHANGE UP (ref 3.5–5.3)
POTASSIUM SERPL-SCNC: 3.6 MMOL/L — SIGNIFICANT CHANGE UP (ref 3.5–5.3)
PROT SERPL-MCNC: 7 G/DL — SIGNIFICANT CHANGE UP (ref 6–8.3)
RBC # BLD: 3.35 M/UL — LOW (ref 3.8–5.2)
RBC # FLD: 14.3 % — SIGNIFICANT CHANGE UP (ref 10.3–14.5)
SODIUM SERPL-SCNC: 148 MMOL/L — HIGH (ref 135–145)
WBC # BLD: 9.82 K/UL — SIGNIFICANT CHANGE UP (ref 3.8–10.5)
WBC # FLD AUTO: 9.82 K/UL — SIGNIFICANT CHANGE UP (ref 3.8–10.5)

## 2020-02-18 PROCEDURE — 99232 SBSQ HOSP IP/OBS MODERATE 35: CPT

## 2020-02-18 PROCEDURE — 99233 SBSQ HOSP IP/OBS HIGH 50: CPT | Mod: GC

## 2020-02-18 RX ORDER — SODIUM CHLORIDE 9 MG/ML
1000 INJECTION, SOLUTION INTRAVENOUS
Refills: 0 | Status: DISCONTINUED | OUTPATIENT
Start: 2020-02-18 | End: 2020-02-19

## 2020-02-18 RX ORDER — INSULIN GLARGINE 100 [IU]/ML
14 INJECTION, SOLUTION SUBCUTANEOUS EVERY MORNING
Refills: 0 | Status: DISCONTINUED | OUTPATIENT
Start: 2020-02-18 | End: 2020-02-22

## 2020-02-18 RX ADMIN — Medication 2.5 MILLIGRAM(S): at 06:43

## 2020-02-18 RX ADMIN — Medication 12.5 MILLIGRAM(S): at 06:43

## 2020-02-18 RX ADMIN — INSULIN GLARGINE 14 UNIT(S): 100 INJECTION, SOLUTION SUBCUTANEOUS at 08:42

## 2020-02-18 RX ADMIN — Medication 2.5 MILLIGRAM(S): at 12:56

## 2020-02-18 RX ADMIN — PIPERACILLIN AND TAZOBACTAM 25 GRAM(S): 4; .5 INJECTION, POWDER, LYOPHILIZED, FOR SOLUTION INTRAVENOUS at 13:08

## 2020-02-18 RX ADMIN — Medication 2.5 MILLIGRAM(S): at 00:20

## 2020-02-18 RX ADMIN — Medication 2.5 MILLIGRAM(S): at 17:19

## 2020-02-18 RX ADMIN — PIPERACILLIN AND TAZOBACTAM 25 GRAM(S): 4; .5 INJECTION, POWDER, LYOPHILIZED, FOR SOLUTION INTRAVENOUS at 06:44

## 2020-02-18 RX ADMIN — PANTOPRAZOLE SODIUM 40 MILLIGRAM(S): 20 TABLET, DELAYED RELEASE ORAL at 13:00

## 2020-02-18 RX ADMIN — SODIUM CHLORIDE 50 MILLILITER(S): 9 INJECTION, SOLUTION INTRAVENOUS at 08:31

## 2020-02-18 RX ADMIN — Medication 1: at 12:58

## 2020-02-18 NOTE — PROGRESS NOTE ADULT - PROBLEM SELECTOR PLAN 3
-  on admission; 8.35 2/1; now 17.8   - anti-TPO elevated c/w autoimmune thyroiditis   - levothyroxine 175 mg changed to 87.5 mg IV; c/w current dose   - repeat TFTs in 4-6 weeks post d/c -see above   - levothyroxine 175 mg changed to 87.5 mg IV   - repeat TFTs in 4-6 weeks post d/c

## 2020-02-18 NOTE — PROGRESS NOTE ADULT - PROBLEM SELECTOR PLAN 2
Complicated UTI given AMS.  Patient meeting SIRS criteria on 2/14 w/ Tmax 100.5 and tachycardia. UA w/ post nitrites, LE, many bacteria. UCx growing >100,000 GNR.   - BCx ngtd   - zosyn (2/14-)  - UCx E.Coli pansensitive SIRS criteria on 2/14 w/ Tmax 100.5 and tachycardia. UA w/ post nitrites, LE, many bacteria. UCx growing >100,000 GNR.   - BCx ngtd   - UCx E.Coli pansensitive  - zosyn (2/14-)

## 2020-02-18 NOTE — PROGRESS NOTE ADULT - PROBLEM SELECTOR PLAN 2
-Noted TSH repeated but level is not reliable at this time since pt recently started Levothyroxine therapy and med was not given on an empty stomach most of the time due to AMS.    -C/w levothyroxine 87.5 MICROGram(s) IVP daily  -NO NEED TO RETEST TSH DURING THIS ADMISSION!

## 2020-02-18 NOTE — PROGRESS NOTE ADULT - SUBJECTIVE AND OBJECTIVE BOX
Diabetes Follow up note:    Chief complaint: f/u T2DM     Interval Hx: Pt remains NPO. Fasting glucose in 90s this AM. Team decreased Lantus to 14 units this AM and starting D5 infusion. Last BG 173mg/dl. Awaiting swallow eval. Daughter in law and granddaughter at bedside, pt interacting w/young granddaughter.     Review of Systems:  unable to assess.     MEDS:    insulin glargine Injectable (LANTUS) 14 Unit(s) SubCutaneous every morning  insulin lispro (HumaLOG) corrective regimen sliding scale   SubCutaneous every 6 hours  levothyroxine Injectable 87.5 MICROGram(s) IV Push at bedtime  methylPREDNISolone sodium succinate Injectable 12.5 milliGRAM(s) IV Push daily  simvastatin 20 milliGRAM(s) Oral at bedtime    piperacillin/tazobactam IVPB.. 3.375 Gram(s) IV Intermittent every 8 hours    Allergies    Depakote (Other)    Intolerances    Seroquel (Other)    PE:  General: Female lying in bed. NAD>   Vital Signs Last 24 Hrs  T(C): 37.1 (17 Feb 2020 22:01), Max: 37.1 (17 Feb 2020 14:18)  T(F): 98.7 (17 Feb 2020 22:01), Max: 98.8 (17 Feb 2020 14:18)  HR: 95 (18 Feb 2020 06:54) (94 - 101)  BP: 137/88 (18 Feb 2020 06:54) (136/81 - 153/93)  BP(mean): --  RR: 18 (18 Feb 2020 06:54) (18 - 18)  SpO2: 86% (18 Feb 2020 09:50) (86% - 97%)  Abd: Soft, NT,ND, Obese.   Extremities: Warm  Neuro: Alert. Confused. Oriented x 0.     LABS:  POCT Blood Glucose.: 173 mg/dL (02-18-20 @ 11:29)  POCT Blood Glucose.: 124 mg/dL (02-18-20 @ 08:41)  POCT Blood Glucose.: 99 mg/dL (02-18-20 @ 06:50)  POCT Blood Glucose.: 90 mg/dL (02-18-20 @ 00:16)  POCT Blood Glucose.: 125 mg/dL (02-17-20 @ 18:30)  POCT Blood Glucose.: 205 mg/dL (02-17-20 @ 12:01)  POCT Blood Glucose.: 104 mg/dL (02-17-20 @ 06:03)  POCT Blood Glucose.: 79 mg/dL (02-17-20 @ 00:24)  POCT Blood Glucose.: 267 mg/dL (02-16-20 @ 17:17)  POCT Blood Glucose.: 207 mg/dL (02-16-20 @ 11:46)  POCT Blood Glucose.: 118 mg/dL (02-16-20 @ 06:41)  POCT Blood Glucose.: 211 mg/dL (02-16-20 @ 00:00)  POCT Blood Glucose.: 228 mg/dL (02-15-20 @ 21:17)  POCT Blood Glucose.: 104 mg/dL (02-15-20 @ 18:00)                            10.4   9.82  )-----------( 405      ( 18 Feb 2020 07:00 )             34.5       02-18    148<H>  |  109<H>  |  8   ----------------------------<  117<H>  3.6   |  21<L>  |  0.89    Ca    9.2      18 Feb 2020 07:00  Phos  2.6     02-18  Mg     2.0     02-18    TPro  7.0  /  Alb  3.8  /  TBili  0.2  /  DBili  x   /  AST  12  /  ALT  13  /  AlkPhos  74  02-18      Thyroid Function Tests:  02-13 @ 09:05 TSH 17.80 FreeT4 -- T3 -- Anti TPO -- Anti Thyroglobulin Ab -- TSI --  02-01 @ 22:37 TSH 8.35 FreeT4 1.3 T3 -- Anti TPO -- Anti Thyroglobulin Ab -- TSI --      Hemoglobin A1C, Whole Blood: 9.0 % <H> [4.0 - 5.6] (01-28-20 @ 19:30)  Hemoglobin A1C, Whole Blood: 9.2 % <H> [4.0 - 5.6] (01-27-20 @ 17:36)          Contact number: beedianne [phone] or [phone]

## 2020-02-18 NOTE — PROGRESS NOTE ADULT - PROBLEM SELECTOR PLAN 6
On met ER 50 at home  - c/w metoprolol 2.5 q6hrs  - CTM bp On met ER 50 at home  - c/w metoprolol 2.5 q6hrs

## 2020-02-18 NOTE — PROGRESS NOTE ADULT - ASSESSMENT
65 y/o F w/h/o uncontrolled T2DM with unknown complications. Also h/o functional paraplegia, hypothyroidism, pulmonary hypertension, COPD, RA on chronic prednisone, SBO s/p ex lap with lysis of adhesions c/b multifocal pneumonia. Here with AMS/agitation. Pt still with AMS of unclear etiology and lack of capacity per mental health team. Also recent RRT with tachycardia, lethargy and no responding to verbal stimuli. On antibiotic for UTI. NPO awaiting swallow eval. Had fasting glucose in 90s so now started on D5 infusion. Remains on steroids so likely will see rebound hyperglycemia effect from Dextrose. BG goal (100-180mg/dl).

## 2020-02-18 NOTE — PROGRESS NOTE ADULT - PROBLEM SELECTOR PLAN 1
Unclear etiology - possibly from 3mm right sided SDH sided on MRI brain; autoimmune encephalitis 2/2 longstanding RA less likely as MRI brain neg for inflammatory changes, from hypothyroidism less likely given correction of TSH/FT4 with minimal change in behavior vs. possible paraneoplastic syndrome, vs other psychiatric or infectious etiology. s/p LP 2/6 w/ sedation; CSF studies w/ 1 nucleated cell; gram stain w/ few PMNs, no orgs; CSF cultures NGTD; CSF PCR, cryptococcal antigen, HSV PCR, AFB smear, Borrelia ab, VDRL neg. Paraneoplastic panel, NMDR-Ab CSF, autoimmune panel ([place] IgM, IgG, dsDNA, anti-RNP, anti-ANF, anti-smith Ab) neg. s/p MRI 2/6 brain revealing for subacute 3mm right holohemisheric subdural hematoma    however seems highly unlikely that this would still be causing pts current status given level was checked and was <3 and in pts who are unable to break down depakote the ammonia level could be elevated but it was not; now appears to be metabolic from UTI; UCx w/ >100,000 GNR   - neurology recs appreciated: veeg, likely frontotemporal dementia, consider amantadine 100 mg qday  - psych f/u requested: as per psych attending, risperidone 0.25mg BID (ordered as disintegrating tablet); however son refusing   - rheumatology recs appreciated: less likely rheumatological origin as MRI brain neg for inflammatory changes; serum ALESSIA, ZAFAR, ribosomal P, dsdna neg, NMDRAb neg   - EEG ordered; pending   - DVT ppx changed to SCDs given bleed Unclear etiology. Per family this is rapidly progressing. Patient had some minor memory impairments roughly 30 days prior to admission when she started suffering from memory loss and paranoid delusions.   -Likely psychosis 2/2 hypothyroidism. PE significant for myxedema. , free T4 0.3. Endocrine following. Now on IV synthroid. Ddx also includes CJD, FT dementia.  -MRI brain 3mm right sided SDH no neurosurgical intervention   -LP 2/6 CSF studies wnl.   -Paraneoplastic panel neg  -UTI treated. Unlikely cause of AMS    -VEEG ordered   -psych following risperidone 0.25mg BID, however son refusing   - rheumatology state rheum origin less likely given neg for inflammatory changes; serum ALESSIA, ZAFAR, ribosomal P, dsdna neg, NMDRAb neg   - DVT ppx changed to SCDs given bleed Unclear etiology. Per family this is rapidly progressing. Patient had some minor memory impairments roughly 30 days prior to admission when she started suffering from memory loss and paranoid delusions.   -Likely psychosis/delirium 2/2 hypothyroidism. PE significant for myxedema. , free T4 0.3. Endocrine following. Now on IV synthroid. Ddx also includes FT dementia.  -MRI brain 3mm right sided SDH no neurosurgical intervention   -LP 2/6 CSF studies wnl.   -Paraneoplastic panel neg  -UTI treated. Unlikely cause of AMS    -VEEG ordered and still pending  -psych following risperidone 0.25mg BID, however son refusing   - rheumatology state rheum origin less likely given neg for inflammatory changes; serum ALESSIA, ZAFAR, ribosomal P, dsdna neg, NMDRAb neg   - DVT ppx changed to SCDs given SDH

## 2020-02-18 NOTE — PROGRESS NOTE ADULT - PROBLEM SELECTOR PLAN 5
-BG 90's 1/16 in the setting of decreased PO intake. Failed bedside swallow.  -speech and swallow eval  -Lantus 34 --> 20 --> 18U --> 14U  -holding pre-meal insulin  -c/w ISS -BG 90's 1/16 in the setting of decreased PO intake. Failed bedside swallow.  -speech and swallow eval pending   -Lantus 34 --> 20 --> 18U --> 14U  -holding pre-meal insulin  -c/w ISS

## 2020-02-18 NOTE — PROGRESS NOTE ADULT - PROBLEM SELECTOR PLAN 10
- DVT ppx: SWITCHED TO SCDS GIVEN BLEED  - Diet: DASH diet w/evening snack when patient more awake; maintenance LR x 16 hrs   - Dispo: pending PT    - FYI holding primidone, simvastatin, gabapentin as of 2/12 as no IV equivalent available   - ADDENDUM: Spoke to pts family members about decision making.  The children agree that Norris will serve as the point of  and will be the final decision maker representing the family for when decisions have to be made.  They are in agreement with moving forward with sedation/potential intubation for pt to undergo further medical testing. Discussions had on 2/3/20. - Davis Kramer  Transitions of Care Status:  1.  Name of PCP:  Kristie Nails MD (PCP)  2.  PCP Contacted on Admission: [ ] Y    [x] N    3.  PCP contacted at Discharge: [ ] Y    [ ] N    [ ] N/A  4.  Post-Discharge Appointment Date and Location:  5.  Summary of Handoff given to PCP: - DVT ppx: SWITCHED TO SCDS GIVEN BLEED  - Diet: NPO except meds   - Dispo: Pending PT   - FYI holding primidone, simvastatin, gabapentin as of 2/12 as no IV equivalent available   - ADDENDUM: Spoke to pts family members about decision making.  The children agree that Norris will serve as the point of  and will be the final decision maker representing the family for when decisions have to be made.  They are in agreement with moving forward with sedation/potential intubation for pt to undergo further medical testing. Discussions had on 2/3/20. - Davis Kramer  Transitions of Care Status:  1.  Name of PCP:  Kristie Nails MD (PCP)  2.  PCP Contacted on Admission: [ ] Y    [x] N    3.  PCP contacted at Discharge: [ ] Y    [ ] N    [ ] N/A  4.  Post-Discharge Appointment Date and Location:  5.  Summary of Handoff given to PCP: - DVT ppx: SWITCHED TO SCDS GIVEN BLEED  - Diet: NPO except meds   - Dispo: IVELISSE   - FYI holding primidone, simvastatin, gabapentin as of 2/12 as no IV equivalent available   - ADDENDUM: Spoke to pts family members about decision making.  The children agree that Norris will serve as the point of  and will be the final decision maker representing the family for when decisions have to be made.  They are in agreement with moving forward with sedation/potential intubation for pt to undergo further medical testing. Discussions had on 2/3/20. - Davis Kramer  Transitions of Care Status:  1.  Name of PCP:  Kristie Nails MD (PCP)  2.  PCP Contacted on Admission: [ ] Y    [x] N    3.  PCP contacted at Discharge: [ ] Y    [ ] N    [ ] N/A  4.  Post-Discharge Appointment Date and Location:  5.  Summary of Handoff given to PCP:

## 2020-02-18 NOTE — PROGRESS NOTE ADULT - ASSESSMENT
64F w/ a PMHx arthritis, hypothyroidism, pulmonary HTN, DM on insulin, HTN, COPD/CHRIS on noctournal CPAP and 3L home), RA on chronic prednisone, chronic tremors, SBO s/p ex lap with lysis of adhesions c/b evisceration 2/2 a coughing episode s/p repeat ex lap c/b multifocal pneumonia and hypercapnic respiratory failure requiring intubation 1/6/19, s/p bronch 1/9/19, and extubated on 1/24/19; further c/b Enterococci bacteremia) p/w agitation 3 weeks of unclear etiology, found to be significantly hypothyroid (), extremely combative and paranoid of family and healthcare staff, s/p MRI brain and LP found to have small right sided SDH, course c/b hyperglycemia; now w/ new change in mental status w/ repeat CT head w/o acute changes; CTA head and neck and CTV without any new changes.

## 2020-02-18 NOTE — PROGRESS NOTE ADULT - PROBLEM SELECTOR PLAN 7
- Bcx + for GPR on 1/12, 1 out of 2 bottles. +Bcx from 1/12 sent to [facility] laboratories, no further speciation available.   - Blood cx obtained on 1/25 no growth X 5 days. Repeat 2/14 NGTD.   - vancomycin started 2/14 d/michael as repeat BCx NGTD

## 2020-02-18 NOTE — PROGRESS NOTE ADULT - PROBLEM SELECTOR PLAN 8
- prednisone 15 mg switched to solumedrol 12.5mg qdaily   - Tramadol PRN - prednisone 15 mg switched to solumedrol 12.5mg qdaily   -steroids are not suspectd to be cause of ams  - Tramadol PRN

## 2020-02-18 NOTE — PROGRESS NOTE ADULT - PROBLEM SELECTOR PLAN 1
-test BG q6h while NPO  -c/w Lantus 14 units QAM  -c/w Humalog low correction scale Q6h  -Will need to assess D5 based on FS values. If glucose above 180mg/dl, will need to either discontinue or increase insulin doses.   -Please notify endocrine team once diet resumed  -Pt unable to care for self at this time. Pt will need to continue basal/bolus therapy upon discharge.  -Plan discussed with pt's family member at bedside.   pager: [phone]

## 2020-02-18 NOTE — PROGRESS NOTE ADULT - ATTENDING COMMENTS
Acute encephalopathy, unspecified.   No clear single etiology at this point in time. Likely multifactorial. Patient with severe hypothyroidism and SDH on imaging. per neuro this could also be frontotemporal dementia given severe behavior disturbances. Head imaging without sinus venous thrombosis and  CSF autoimmune and paraneoplastic panel negative. Pt found to have UTI but unlikely cause of encephalopathy. Psych believes it is metabolic but recommended risperdone which pts son refused, and now also recommending valproic acid prn for agitation. on last few days pt has been more somnolent rather than agitated--she is following some simple commands on exam today. sedation was possible due to VPA? patient is still pending VEEG to complete neuro work up. Neuro last day pt 2/15 and psych last saw pt 2/14. will ask for follow up to tie loose ends before dc planning to [facility].  pt also pending speech/swallow eval.

## 2020-02-18 NOTE — PROGRESS NOTE ADULT - SUBJECTIVE AND OBJECTIVE BOX
***************************************************************  Logan Bryan, PGY1  Internal Medicine   pager: [phone]  ***************************************************************    AFTAB BASS  64y  MRN: [id number]    Patient is a 64y old  Female who presents with a chief complaint of change in mental status (17 Feb 2020 16:01)      Subjective: no events ON. BG 90 this morning - Lantus decreased to 14U. Denies fever, CP, SOB, abn pain, N/V, dysuria. Tolerating diet.      MEDICATIONS  (STANDING):  acetaminophen  IVPB .. 1000 milliGRAM(s) IV Intermittent once  budesonide  80 MICROgram(s)/formoterol 4.5 MICROgram(s) Inhaler 2 Puff(s) Inhalation two times a day  dextrose 5%. 1000 milliLiter(s) (50 mL/Hr) IV Continuous <Continuous>  dextrose 50% Injectable 12.5 Gram(s) IV Push once  dextrose 50% Injectable 25 Gram(s) IV Push once  dextrose 50% Injectable 25 Gram(s) IV Push once  influenza   Vaccine 0.5 milliLiter(s) IntraMuscular once  insulin glargine Injectable (LANTUS) 14 Unit(s) SubCutaneous every morning  insulin lispro (HumaLOG) corrective regimen sliding scale   SubCutaneous every 6 hours  levothyroxine Injectable 87.5 MICROGram(s) IV Push at bedtime  methylPREDNISolone sodium succinate Injectable 12.5 milliGRAM(s) IV Push daily  metoprolol tartrate Injectable 2.5 milliGRAM(s) IV Push every 6 hours  pantoprazole  Injectable 40 milliGRAM(s) IV Push daily  piperacillin/tazobactam IVPB.. 3.375 Gram(s) IV Intermittent every 8 hours  primidone 50 milliGRAM(s) Oral at bedtime  simvastatin 20 milliGRAM(s) Oral at bedtime    MEDICATIONS  (PRN):  acetaminophen  Suppository .. 650 milliGRAM(s) Rectal every 6 hours PRN Temp greater or equal to 38C (100.4F)  albuterol/ipratropium for Nebulization 3 milliLiter(s) Nebulizer every 6 hours PRN Shortness of Breath and/or Wheezing  dextrose 40% Gel 15 Gram(s) Oral once PRN Blood Glucose LESS THAN 70 milliGRAM(s)/deciLiter  glucagon  Injectable 1 milliGRAM(s) IntraMuscular once PRN Glucose <70 milliGRAM(s)/deciLiter  melatonin 3 milliGRAM(s) Oral at bedtime PRN Insomnia  morphine  - Injectable 4 milliGRAM(s) IV Push every 6 hours PRN Moderate Pain (4 - 6)      Objective:    Vitals: Vital Signs Last 24 Hrs  T(C): 37.1 (02-17-20 @ 22:01), Max: 37.1 (02-17-20 @ 14:18)  T(F): 98.7 (02-17-20 @ 22:01), Max: 98.8 (02-17-20 @ 14:18)  HR: 95 (02-18-20 @ 06:54) (94 - 101)  BP: 137/88 (02-18-20 @ 06:54) (136/81 - 153/93)  BP(mean): --  RR: 18 (02-18-20 @ 06:54) (18 - 18)  SpO2: 95% (02-18-20 @ 06:54) (93% - 97%)            I&O's Summary    17 Feb 2020 07:01  -  18 Feb 2020 07:00  --------------------------------------------------------  IN: 0 mL / OUT: 100 mL / NET: -100 mL        PHYSICAL EXAM:  GENERAL: agitated female on RA    HEENT: PERRL, no scleral icterus, no head and neck lad   CHEST/LUNG: CTAB, no wheezing, rhonchi   HEART: RRR no murmurs   ABDOMEN: soft, nondistended, normoactive, nontender to palpation, bandage clear, dry and intact   SKIN: Old anterior abdominal wall wound appears chronic w/o s&s of infection (nonerythematous, no fluctuance)  NERVOUS SYSTEM: Alert & Oriented X 0; PERRL. no facial asymmetry or facial droop, moving upper extremity bl, resisting eye exam, withdrawing from pain on all extremities. Brachial and patellar reflexes 1/4 b/l. Babinski downgoing. Some cogwheel rigidity of b/l UEs, bl tremors of hands.      LABS:  02-17    143  |  104  |  8   ----------------------------<  188<H>  3.9   |  21<L>  |  0.87  02-16    144  |  106  |  13  ----------------------------<  184<H>  4.1   |  18<L>  |  0.91  02-15    141  |  102  |  15  ----------------------------<  190<H>  4.5   |  20<L>  |  0.92    Ca    9.2      17 Feb 2020 09:56  Ca    9.2      16 Feb 2020 10:29  Ca    9.3      15 Feb 2020 11:40  Phos  2.5     02-17  Mg     2.0     02-17    TPro  7.2  /  Alb  3.8  /  TBili  0.2  /  DBili  x   /  AST  16  /  ALT  17  /  AlkPhos  76  02-16  TPro  7.2  /  Alb  3.9  /  TBili  0.2  /  DBili  x   /  AST  12  /  ALT  16  /  AlkPhos  72  02-15                                              10.4   9.82  )-----------( 405      ( 18 Feb 2020 07:00 )             34.5                         10.8   10.86 )-----------( 371      ( 17 Feb 2020 09:56 )             35.2                         10.1   10.04 )-----------( 377      ( 16 Feb 2020 10:29 )             33.6     CAPILLARY BLOOD GLUCOSE      POCT Blood Glucose.: 99 mg/dL (18 Feb 2020 06:50)  POCT Blood Glucose.: 90 mg/dL (18 Feb 2020 00:16)  POCT Blood Glucose.: 125 mg/dL (17 Feb 2020 18:30)  POCT Blood Glucose.: 205 mg/dL (17 Feb 2020 12:01)      RADIOLOGY & ADDITIONAL TESTS:    Imaging Personally Reviewed:  [x ] YES  [ ] NO    Consultants involved in case:   Consultant(s) Notes Reviewed:  [ x] YES  [ ] NO:   Care Discussed with Consultants/Other Providers [x ] YES  [ ] NO ***************************************************************  Logan Bryan, PGY1  Internal Medicine   pager: [phone]  ***************************************************************    AFTAB BASS  64y  MRN: [id number]    Patient is a 64y old  Female who presents with a chief complaint of change in mental status (17 Feb 2020 16:01)      Subjective: no events ON. BG 90 this morning - Lantus decreased to 14U. This morning the patient is still confused. Does not respond appropriately to questions.   MEDICATIONS  (STANDING):  acetaminophen  IVPB .. 1000 milliGRAM(s) IV Intermittent once  budesonide  80 MICROgram(s)/formoterol 4.5 MICROgram(s) Inhaler 2 Puff(s) Inhalation two times a day  dextrose 5%. 1000 milliLiter(s) (50 mL/Hr) IV Continuous <Continuous>  dextrose 50% Injectable 12.5 Gram(s) IV Push once  dextrose 50% Injectable 25 Gram(s) IV Push once  dextrose 50% Injectable 25 Gram(s) IV Push once  influenza   Vaccine 0.5 milliLiter(s) IntraMuscular once  insulin glargine Injectable (LANTUS) 14 Unit(s) SubCutaneous every morning  insulin lispro (HumaLOG) corrective regimen sliding scale   SubCutaneous every 6 hours  levothyroxine Injectable 87.5 MICROGram(s) IV Push at bedtime  methylPREDNISolone sodium succinate Injectable 12.5 milliGRAM(s) IV Push daily  metoprolol tartrate Injectable 2.5 milliGRAM(s) IV Push every 6 hours  pantoprazole  Injectable 40 milliGRAM(s) IV Push daily  piperacillin/tazobactam IVPB.. 3.375 Gram(s) IV Intermittent every 8 hours  primidone 50 milliGRAM(s) Oral at bedtime  simvastatin 20 milliGRAM(s) Oral at bedtime    MEDICATIONS  (PRN):  acetaminophen  Suppository .. 650 milliGRAM(s) Rectal every 6 hours PRN Temp greater or equal to 38C (100.4F)  albuterol/ipratropium for Nebulization 3 milliLiter(s) Nebulizer every 6 hours PRN Shortness of Breath and/or Wheezing  dextrose 40% Gel 15 Gram(s) Oral once PRN Blood Glucose LESS THAN 70 milliGRAM(s)/deciLiter  glucagon  Injectable 1 milliGRAM(s) IntraMuscular once PRN Glucose <70 milliGRAM(s)/deciLiter  melatonin 3 milliGRAM(s) Oral at bedtime PRN Insomnia  morphine  - Injectable 4 milliGRAM(s) IV Push every 6 hours PRN Moderate Pain (4 - 6)      Objective:    Vitals: Vital Signs Last 24 Hrs  T(C): 37.1 (02-17-20 @ 22:01), Max: 37.1 (02-17-20 @ 14:18)  T(F): 98.7 (02-17-20 @ 22:01), Max: 98.8 (02-17-20 @ 14:18)  HR: 95 (02-18-20 @ 06:54) (94 - 101)  BP: 137/88 (02-18-20 @ 06:54) (136/81 - 153/93)  BP(mean): --  RR: 18 (02-18-20 @ 06:54) (18 - 18)  SpO2: 95% (02-18-20 @ 06:54) (93% - 97%)            I&O's Summary    17 Feb 2020 07:01  -  18 Feb 2020 07:00  --------------------------------------------------------  IN: 0 mL / OUT: 100 mL / NET: -100 mL        PHYSICAL EXAM:  GENERAL: lethargic appearing  HEENT: PERRL, no scleral icterus, no head and neck lad   CHEST/LUNG: CTAB, no wheezing, rhonchi   HEART: RRR no murmurs   ABDOMEN: soft, nondistended, normoactive, nontender to palpation, bandage clear, dry and intact   SKIN: Old anterior abdominal wall wound appears chronic w/o s&s of infection (nonerythematous, no fluctuance)  NERVOUS SYSTEM: Alert & Oriented X 0       LABS:  02-17    143  |  104  |  8   ----------------------------<  188<H>  3.9   |  21<L>  |  0.87  02-16    144  |  106  |  13  ----------------------------<  184<H>  4.1   |  18<L>  |  0.91  02-15    141  |  102  |  15  ----------------------------<  190<H>  4.5   |  20<L>  |  0.92    Ca    9.2      17 Feb 2020 09:56  Ca    9.2      16 Feb 2020 10:29  Ca    9.3      15 Feb 2020 11:40  Phos  2.5     02-17  Mg     2.0     02-17    TPro  7.2  /  Alb  3.8  /  TBili  0.2  /  DBili  x   /  AST  16  /  ALT  17  /  AlkPhos  76  02-16  TPro  7.2  /  Alb  3.9  /  TBili  0.2  /  DBili  x   /  AST  12  /  ALT  16  /  AlkPhos  72  02-15                                              10.4   9.82  )-----------( 405      ( 18 Feb 2020 07:00 )             34.5                         10.8   10.86 )-----------( 371      ( 17 Feb 2020 09:56 )             35.2                         10.1   10.04 )-----------( 377      ( 16 Feb 2020 10:29 )             33.6     CAPILLARY BLOOD GLUCOSE      POCT Blood Glucose.: 99 mg/dL (18 Feb 2020 06:50)  POCT Blood Glucose.: 90 mg/dL (18 Feb 2020 00:16)  POCT Blood Glucose.: 125 mg/dL (17 Feb 2020 18:30)  POCT Blood Glucose.: 205 mg/dL (17 Feb 2020 12:01)      RADIOLOGY & ADDITIONAL TESTS:    Imaging Personally Reviewed:  [x ] YES  [ ] NO    Consultants involved in case:   Consultant(s) Notes Reviewed:  [ x] YES  [ ] NO:   Care Discussed with Consultants/Other Providers [x ] YES  [ ] NO

## 2020-02-19 LAB
AMMONIA BLD-MCNC: 34 UMOL/L — SIGNIFICANT CHANGE UP (ref 11–55)
ANION GAP SERPL CALC-SCNC: 17 MMOL/L — SIGNIFICANT CHANGE UP (ref 5–17)
BASE EXCESS BLDV CALC-SCNC: -2.7 MMOL/L — LOW (ref -2–2)
BUN SERPL-MCNC: 6 MG/DL — LOW (ref 7–23)
CA-I SERPL-SCNC: 1.18 MMOL/L — SIGNIFICANT CHANGE UP (ref 1.12–1.3)
CALCIUM SERPL-MCNC: 9.1 MG/DL — SIGNIFICANT CHANGE UP (ref 8.4–10.5)
CHLORIDE BLDV-SCNC: 112 MMOL/L — HIGH (ref 96–108)
CHLORIDE SERPL-SCNC: 105 MMOL/L — SIGNIFICANT CHANGE UP (ref 96–108)
CO2 BLDV-SCNC: 24 MMOL/L — SIGNIFICANT CHANGE UP (ref 22–30)
CO2 SERPL-SCNC: 22 MMOL/L — SIGNIFICANT CHANGE UP (ref 22–31)
CREAT SERPL-MCNC: 0.77 MG/DL — SIGNIFICANT CHANGE UP (ref 0.5–1.3)
CULTURE RESULTS: SIGNIFICANT CHANGE UP
CULTURE RESULTS: SIGNIFICANT CHANGE UP
GAS PNL BLDV: 143 MMOL/L — SIGNIFICANT CHANGE UP (ref 135–145)
GAS PNL BLDV: SIGNIFICANT CHANGE UP
GAS PNL BLDV: SIGNIFICANT CHANGE UP
GLUCOSE BLDC GLUCOMTR-MCNC: 128 MG/DL — HIGH (ref 70–99)
GLUCOSE BLDC GLUCOMTR-MCNC: 130 MG/DL — HIGH (ref 70–99)
GLUCOSE BLDC GLUCOMTR-MCNC: 145 MG/DL — HIGH (ref 70–99)
GLUCOSE BLDC GLUCOMTR-MCNC: 180 MG/DL — HIGH (ref 70–99)
GLUCOSE BLDC GLUCOMTR-MCNC: 197 MG/DL — HIGH (ref 70–99)
GLUCOSE BLDV-MCNC: 184 MG/DL — HIGH (ref 70–99)
GLUCOSE SERPL-MCNC: 126 MG/DL — HIGH (ref 70–99)
HCO3 BLDV-SCNC: 23 MMOL/L — SIGNIFICANT CHANGE UP (ref 21–29)
HCT VFR BLD CALC: 35.3 % — SIGNIFICANT CHANGE UP (ref 34.5–45)
HCT VFR BLDA CALC: 32 % — LOW (ref 39–50)
HGB BLD CALC-MCNC: 10.5 G/DL — LOW (ref 11.5–15.5)
HGB BLD-MCNC: 10.5 G/DL — LOW (ref 11.5–15.5)
LACTATE BLDV-MCNC: 1 MMOL/L — SIGNIFICANT CHANGE UP (ref 0.7–2)
MAGNESIUM SERPL-MCNC: 1.9 MG/DL — SIGNIFICANT CHANGE UP (ref 1.6–2.6)
MCHC RBC-ENTMCNC: 29.7 GM/DL — LOW (ref 32–36)
MCHC RBC-ENTMCNC: 30.5 PG — SIGNIFICANT CHANGE UP (ref 27–34)
MCV RBC AUTO: 102.6 FL — HIGH (ref 80–100)
NRBC # BLD: 0 /100 WBCS — SIGNIFICANT CHANGE UP (ref 0–0)
OTHER CELLS CSF MANUAL: 13 ML/DL — LOW (ref 18–22)
PCO2 BLDV: 44 MMHG — SIGNIFICANT CHANGE UP (ref 35–50)
PH BLDV: 7.33 — LOW (ref 7.35–7.45)
PHOSPHATE SERPL-MCNC: 2.3 MG/DL — LOW (ref 2.5–4.5)
PLATELET # BLD AUTO: 431 K/UL — HIGH (ref 150–400)
PO2 BLDV: 60 MMHG — HIGH (ref 25–45)
POTASSIUM BLDV-SCNC: 3.3 MMOL/L — LOW (ref 3.5–5.3)
POTASSIUM SERPL-MCNC: 3.6 MMOL/L — SIGNIFICANT CHANGE UP (ref 3.5–5.3)
POTASSIUM SERPL-SCNC: 3.6 MMOL/L — SIGNIFICANT CHANGE UP (ref 3.5–5.3)
RBC # BLD: 3.44 M/UL — LOW (ref 3.8–5.2)
RBC # FLD: 14.4 % — SIGNIFICANT CHANGE UP (ref 10.3–14.5)
SAO2 % BLDV: 90 % — HIGH (ref 67–88)
SODIUM SERPL-SCNC: 144 MMOL/L — SIGNIFICANT CHANGE UP (ref 135–145)
SPECIMEN SOURCE: SIGNIFICANT CHANGE UP
SPECIMEN SOURCE: SIGNIFICANT CHANGE UP
WBC # BLD: 9.58 K/UL — SIGNIFICANT CHANGE UP (ref 3.8–10.5)
WBC # FLD AUTO: 9.58 K/UL — SIGNIFICANT CHANGE UP (ref 3.8–10.5)

## 2020-02-19 PROCEDURE — 95816 EEG AWAKE AND DROWSY: CPT | Mod: 26

## 2020-02-19 PROCEDURE — 99232 SBSQ HOSP IP/OBS MODERATE 35: CPT

## 2020-02-19 PROCEDURE — 99233 SBSQ HOSP IP/OBS HIGH 50: CPT | Mod: GC

## 2020-02-19 RX ORDER — METOPROLOL TARTRATE 50 MG
2.5 TABLET ORAL EVERY 6 HOURS
Refills: 0 | Status: DISCONTINUED | OUTPATIENT
Start: 2020-02-19 | End: 2020-02-25

## 2020-02-19 RX ORDER — SODIUM CHLORIDE 9 MG/ML
1000 INJECTION, SOLUTION INTRAVENOUS
Refills: 0 | Status: DISCONTINUED | OUTPATIENT
Start: 2020-02-19 | End: 2020-02-19

## 2020-02-19 RX ORDER — SODIUM CHLORIDE 9 MG/ML
1000 INJECTION, SOLUTION INTRAVENOUS
Refills: 0 | Status: DISCONTINUED | OUTPATIENT
Start: 2020-02-19 | End: 2020-02-21

## 2020-02-19 RX ADMIN — Medication 2.5 MILLIGRAM(S): at 13:18

## 2020-02-19 RX ADMIN — PANTOPRAZOLE SODIUM 40 MILLIGRAM(S): 20 TABLET, DELAYED RELEASE ORAL at 13:18

## 2020-02-19 RX ADMIN — SODIUM CHLORIDE 50 MILLILITER(S): 9 INJECTION, SOLUTION INTRAVENOUS at 13:32

## 2020-02-19 RX ADMIN — Medication 2.5 MILLIGRAM(S): at 18:06

## 2020-02-19 RX ADMIN — Medication 2.5 MILLIGRAM(S): at 06:12

## 2020-02-19 RX ADMIN — Medication 2.5 MILLIGRAM(S): at 00:58

## 2020-02-19 RX ADMIN — Medication 1: at 13:32

## 2020-02-19 RX ADMIN — Medication 12.5 MILLIGRAM(S): at 06:12

## 2020-02-19 RX ADMIN — INSULIN GLARGINE 14 UNIT(S): 100 INJECTION, SOLUTION SUBCUTANEOUS at 09:13

## 2020-02-19 RX ADMIN — Medication 87.5 MICROGRAM(S): at 00:58

## 2020-02-19 NOTE — PROGRESS NOTE ADULT - PROBLEM SELECTOR PLAN 2
SIRS criteria on 2/14 w/ Tmax 100.5 and tachycardia. UA w/ post nitrites, LE, many bacteria. UCx growing >100,000 GNR.   - BCx ngtd   - UCx E.Coli pansensitive  - zosyn (2/14-2/18) complete

## 2020-02-19 NOTE — PROGRESS NOTE ADULT - PROBLEM SELECTOR PLAN 5
-BG 90's 1/16 in the setting of decreased PO intake. Failed bedside swallow.  -speech and swallow eval pending   -Lantus 34 --> 20 --> 18U --> 14U  -holding pre-meal insulin  -c/w ISS

## 2020-02-19 NOTE — PROGRESS NOTE ADULT - PROBLEM SELECTOR PLAN 4
Patient w/ anterior abdominal wound from SBO revision   - wound care following; change dressing every 7 days; changed 2/6, 2/13  - wound base clean w/o infection

## 2020-02-19 NOTE — PROGRESS NOTE ADULT - PROBLEM SELECTOR PLAN 7
- Bcx + for GPR on 1/12, 1 out of 2 bottles. +Bcx from 1/12 sent to [facility] laboratories, no further speciation available.   - Blood cx obtained on 1/25 no growth X 5 days. Repeat 2/14 NGTD.   - vancomycin started 2/14 d/michael as repeat BCx NGTD

## 2020-02-19 NOTE — PROGRESS NOTE ADULT - SUBJECTIVE AND OBJECTIVE BOX
DIABETES FOLLOW UP NOTE: Saw pt earlier today  INTERVAL HX: 65 y/o F w/h/o uncontrolled T2DM with unknown complications. Also h/o functional paraplegia, hypothyroidism, pulmonary hypertension, COPD, RA on chronic prednisone, SBO s/p ex lap with lysis of adhesions c/b multifocal pneumonia. Here with AMS/agitation. Pt still with AMS of unclear etiology and lack of capacity per mental health team. Also recent RRT with tachycardia, lethargy and no responding to verbal stimuli. Completed antibiotic tx for UTI. Remains NPO due to mental status. Glycemic control moslty at goal while on present insulin regimen. No hypoglycemia. On IVF of D5 1/2 NS at 50cc/hr while NPO. Having EEG done at time of visit.        Review of Systems:  Non verbal unable due to AMS      Allergies    Depakote (Other)    Intolerances    Seroquel (Other)    MEDICATIONS:  insulin glargine Injectable (LANTUS) 14 Unit(s) SubCutaneous every morning  insulin lispro (HumaLOG) corrective regimen sliding scale   SubCutaneous every 6 hours  levothyroxine Injectable 87.5 MICROGram(s) IV Push at bedtime  methylPREDNISolone sodium succinate Injectable 12.5 milliGRAM(s) IV Push daily  simvastatin 20 milliGRAM(s) Oral at bedtime      PHYSICAL EXAM:  VITALS: T(C): 37.4 (02-19-20 @ 13:16)  T(F): 99.4 (02-19-20 @ 13:16), Max: 99.4 (02-19-20 @ 13:16)  HR: 93 (02-19-20 @ 13:16) (76 - 93)  BP: 114/84 (02-19-20 @ 13:16) (114/84 - 156/72)  RR:  (18 - 20)  SpO2:  (96% - 100%)  Wt(kg): --  GENERAL: Female laying in bed in NAD. Staff doing am care at time of visit  Abdomen: Soft, nontender, non distended. Abdominal dressing D&I  Extremities: Warm, no edema in all 4 exts  NEURO: Lethargic     LABS:  POCT Blood Glucose.: 180 mg/dL (02-19-20 @ 13:12)  POCT Blood Glucose.: 197 mg/dL (02-19-20 @ 08:49)  POCT Blood Glucose.: 128 mg/dL (02-19-20 @ 06:34)  POCT Blood Glucose.: 130 mg/dL (02-19-20 @ 00:01)  POCT Blood Glucose.: 150 mg/dL (02-18-20 @ 18:12)  POCT Blood Glucose.: 185 mg/dL (02-18-20 @ 12:43)  POCT Blood Glucose.: 173 mg/dL (02-18-20 @ 11:29)  POCT Blood Glucose.: 124 mg/dL (02-18-20 @ 08:41)  POCT Blood Glucose.: 99 mg/dL (02-18-20 @ 06:50)  POCT Blood Glucose.: 90 mg/dL (02-18-20 @ 00:16)  POCT Blood Glucose.: 125 mg/dL (02-17-20 @ 18:30)  POCT Blood Glucose.: 205 mg/dL (02-17-20 @ 12:01)  POCT Blood Glucose.: 104 mg/dL (02-17-20 @ 06:03)  POCT Blood Glucose.: 79 mg/dL (02-17-20 @ 00:24)  POCT Blood Glucose.: 267 mg/dL (02-16-20 @ 17:17)                            10.5   9.58  )-----------( 431      ( 19 Feb 2020 07:44 )             35.3       02-19    144  |  105  |  6<L>  ----------------------------<  126<H>  3.6   |  22  |  0.77    EGFR if : 96  EGFR if non : 83    Ca    9.1      02-19  Mg     1.9     02-19  Phos  2.3     02-19    TPro  7.0  /  Alb  3.8  /  TBili  0.2  /  DBili  x   /  AST  12  /  ALT  13  /  AlkPhos  74  02-18      Thyroid Function Tests:  02-13 @ 09:05 TSH 17.80 FreeT4 -- T3 -- Anti TPO -- Anti Thyroglobulin Ab -- TSI --  02-01 @ 22:37 TSH 8.35 FreeT4 1.3 T3 -- Anti TPO -- Anti Thyroglobulin Ab -- TSI --      Hemoglobin A1C, Whole Blood: 9.0 % <H> [4.0 - 5.6] (01-28-20 @ 19:30)  Hemoglobin A1C, Whole Blood: 9.2 % <H> [4.0 - 5.6] (01-27-20 @ 17:36)      01-27 Chol 307<H> <H> HDL 50 Trig 352<H>

## 2020-02-19 NOTE — SWALLOW BEDSIDE ASSESSMENT ADULT - SWALLOW EVAL: DIAGNOSIS
Pt admitted with AMS and initially refused imaging, however, later given MRI with sedation: Right holohemispheric subdural hematoma subacute by signal characteristics measuring 3 mm in thickness and not clearly identified on the patient's prior CT 1/12/2020. Pt admitted with AMS and initially refused imaging, however, later given MRI with sedation: Right holohemispheric subdural hematoma subacute by signal characteristics measuring 3 mm in thickness and not clearly identified on the patient's prior CT 1/12/2020. Pt seen bedside for swallow evaluation. Pt's mentation does not support PO intake at this time. Pt unable to be aroused despite max multimodal cues/noxious stimuli. PO deferred at this time due to inability to arouse.

## 2020-02-19 NOTE — PROGRESS NOTE ADULT - PROBLEM SELECTOR PLAN 10
- DVT ppx: SWITCHED TO SCDS GIVEN BLEED  - Diet: NPO except meds   - Dispo: [facility]   - FYI holding primidone, simvastatin, gabapentin as of 2/12 as no IV equivalent available   - ADDENDUM: Spoke to pts family members about decision making.  The children agree that Norris will serve as the point of  and will be the final decision maker representing the family for when decisions have to be made.  They are in agreement with moving forward with sedation/potential intubation for pt to undergo further medical testing. Discussions had on 2/3/20. - Davis Kramer  Transitions of Care Status:  1.  Name of PCP:  Kristie Nails MD (PCP)  2.  PCP Contacted on Admission: [ ] Y    [x] N    3.  PCP contacted at Discharge: [ ] Y    [ ] N    [ ] N/A  4.  Post-Discharge Appointment Date and Location:  5.  Summary of Handoff given to PCP:

## 2020-02-19 NOTE — SWALLOW BEDSIDE ASSESSMENT ADULT - SLP GENERAL OBSERVATIONS
Pt encountered bedside, asleep and unable to be aroused. +2LO2NC. Pt's suboptimal arousal/mentation does not support PO intake at this time. Discussed with ESVIN Fields.

## 2020-02-19 NOTE — PROGRESS NOTE ADULT - PROBLEM SELECTOR PLAN 8
- prednisone 15 mg switched to solumedrol 12.5mg qdaily   -steroids are not suspectd to be cause of ams  - Tramadol PRN

## 2020-02-19 NOTE — CHART NOTE - NSCHARTNOTEFT_GEN_A_CORE
Patient seen for nutrition follow up.     Source: Comprehensive chart review, patient, medical team     Chart reviewed, events noted. Pt NPO since , 6 days total without oral intake. Also noted per flowsheets/chart that pt was not eating since . Endocrinology following for glycemic control and insulin adjustments. S/p Bedside Swallow Assessment recommending NPO, with non-oral nutrition/hydration/medications (). Per discussion with team, plan to place NGT to initiate feeding.     Diet : NPO except medications     Patient able to shake head yes/no in response to questions. Denies acute GI distress. Per chart, last BM .      PO intake : 0%     Source for PO intake: nursing flowsheets.      Enteral/Parenteral Nutrition: none at this time.     Daily Weight in k.2 (), 93.3 kg (), 94.3 (, dosing weight)  - Weights relatively stable, question accuracy in setting of edema    Nutrition Focused Physical Exam deferred at this time as pt began tearing during RD interview. Will follow up to assess.    Pertinent Medications: MEDICATIONS  (STANDING):  acetaminophen  IVPB .. 1000 milliGRAM(s) IV Intermittent once  budesonide  80 MICROgram(s)/formoterol 4.5 MICROgram(s) Inhaler 2 Puff(s) Inhalation two times a day  dextrose 5% + sodium chloride 0.45%. 1000 milliLiter(s) (50 mL/Hr) IV Continuous <Continuous>  dextrose 5%. 1000 milliLiter(s) (50 mL/Hr) IV Continuous <Continuous>  dextrose 50% Injectable 12.5 Gram(s) IV Push once  dextrose 50% Injectable 25 Gram(s) IV Push once  dextrose 50% Injectable 25 Gram(s) IV Push once  influenza   Vaccine 0.5 milliLiter(s) IntraMuscular once  insulin glargine Injectable (LANTUS) 14 Unit(s) SubCutaneous every morning  insulin lispro (HumaLOG) corrective regimen sliding scale   SubCutaneous every 6 hours  levothyroxine Injectable 87.5 MICROGram(s) IV Push at bedtime  methylPREDNISolone sodium succinate Injectable 12.5 milliGRAM(s) IV Push daily  metoprolol tartrate Injectable 2.5 milliGRAM(s) IV Push every 6 hours  pantoprazole  Injectable 40 milliGRAM(s) IV Push daily  primidone 50 milliGRAM(s) Oral at bedtime  simvastatin 20 milliGRAM(s) Oral at bedtime    MEDICATIONS  (PRN):  acetaminophen  Suppository .. 650 milliGRAM(s) Rectal every 6 hours PRN Temp greater or equal to 38C (100.4F)  albuterol/ipratropium for Nebulization 3 milliLiter(s) Nebulizer every 6 hours PRN Shortness of Breath and/or Wheezing  dextrose 40% Gel 15 Gram(s) Oral once PRN Blood Glucose LESS THAN 70 milliGRAM(s)/deciLiter  glucagon  Injectable 1 milliGRAM(s) IntraMuscular once PRN Glucose <70 milliGRAM(s)/deciLiter  melatonin 3 milliGRAM(s) Oral at bedtime PRN Insomnia  morphine  - Injectable 4 milliGRAM(s) IV Push every 6 hours PRN Moderate Pain (4 - 6)    Pertinent Labs:  @ 07:44: Na 144, BUN 6<L>, Cr 0.77, <H>, K+ 3.6, Phos 2.3<L>, Mg 1.9, Alk Phos --, ALT/SGPT --, AST/SGOT --, HbA1c --    Finger Sticks:  POCT Blood Glucose.: 180 mg/dL ( @ 13:12)  POCT Blood Glucose.: 197 mg/dL ( @ 08:49)  POCT Blood Glucose.: 128 mg/dL ( @ 06:34)  POCT Blood Glucose.: 130 mg/dL ( @ 00:01)  POCT Blood Glucose.: 150 mg/dL ( @ 18:12)    Skin per nursing documentation: free of pressure injuries  Edema per nursing documentation: 1+ generalized    Estimated Needs:   · Weight Used for Calculation- dosing weight 94.3 kg ()    - Estimated Energy Needs (15-20 calories/kg): 9195-2244 kcal/day   - Estimated Protein Needs (0.8-1.0 gm/kg): 75.4-94.3 gm/day    Previous Nutrition Diagnosis: Altered Nutrition Related Lab Values  Nutrition Diagnosis is: ongoing    New Nutrition Diagnosis: Moderate Malnutrition in the context of acute illness  Related to: inadequate protein-energy intake, lethargy, inability to consume sufficient energy 2/2 NPO status   As evidenced by: 0% intake x8 days, 1+ generalized edema     Interventions:     Recommend  1) Defer initiation of feeding to medical team. Patient has been without oral intake for ~8 days at this time. Prior to initiating enteral nutrition, recommend administer multivitamin + thiamine to prevent refeeding syndrome. Monitor electrolytes closely and replete as needed.  2) As medically feasible, recommend Glucerna 1.2 at 10 ml/hr, increasing by 10 ml/hr q4-6 hours or as tolerated to goal rate of 60 ml/hr x 24 hours. At goal, regimen provides 1440 ml formula, 1728 kcal, 86.4 gm protein, 1159 ml free water (meeting 18.3 kcal/kg, 0.9 gm/kg using dosing weight 94.3 kg).   4) Malnutrition alert placed in chart; discussed with provider on [facility]MON Team B    Monitoring and Evaluation:   - Continue to monitor nutritional intake, tolerance to diet prescription, weights, labs, skin integrity  - RD remains available upon request and will follow up per protocol    Lana Engle RD CDN    Pager# [phone]

## 2020-02-19 NOTE — PROGRESS NOTE ADULT - PROBLEM SELECTOR PLAN 1
-test BG q6h while NPO  -c/w Lantus 14 units QAM  -c/w Humalog low correction scale Q6h  - c/w D5 infusion while NPO. Need to assess if pt needs to start TFs since she has been NPO for several days.    -Pt unable to care for self at this time. Pt will need to continue basal/bolus therapy upon discharge.  -Plan discussed with pt/team.  Contact info: [phone] (24/7). pager [phone]

## 2020-02-19 NOTE — PROGRESS NOTE ADULT - ASSESSMENT
63 y/o F w/h/o uncontrolled T2DM with unknown complications. Also h/o functional paraplegia, hypothyroidism, pulmonary hypertension, COPD, RA on chronic prednisone, SBO s/p ex lap with lysis of adhesions c/b multifocal pneumonia. Here with AMS/agitation. Pt still with AMS of unclear etiology and lack of capacity per mental health team. Also recent RRT with tachycardia, lethargy and no responding to verbal stimuli. Completed  antibiotic for UTI. NPO and getting D5 IVF with BG at goal while on present insulin doses. On IV steroid and IV synthroid.  Will continue present insulin regimen for now.  BG goal (100-180mg/dl).

## 2020-02-19 NOTE — CHART NOTE - NSCHARTNOTEFT_GEN_A_CORE
EEG demonstrates diffuse slowing, no seizures. No further neurology workup. Neurology to sign off. Call back prn.

## 2020-02-19 NOTE — SWALLOW BEDSIDE ASSESSMENT ADULT - SWALLOW EVAL: PROGNOSIS
HX CONTINUED: Neurology 2/12: She could have a transverse venous sinus thrombosis, which can possibly contribute to change in mental status. Will need to rule out. Plan: obtain urgent CT venogram w/ contrast. hypercoagulable workup. 2/13 Too lethargic to take PO medications. Neurology 2/13:  Pt was initially started on depakote for mood stabilization, but this was discontinued due to concern that it was making her lethargic. On exam, patient is lethargic, non-verbal, not following commands, but withdraws/localizes/screams to noxious stimuli and is non-focal. Impression: AMS, delirium, etiology currently unclear. Neurology 2/15: Pt today was more awake and interactive, she was watching her grandson and was not agitated.  Likely frontotemporal dementia. Differential includes delirium, although new onset psychosis, unlikely AIE --> consider amantadine. psych f/u requested: as per psych attending, risperidone - son refusing. Internal medicine 2/19: Pt lethargic and nonverbal

## 2020-02-19 NOTE — CHART NOTE - NSCHARTNOTEFT_GEN_A_CORE
Upon Nutritional Assessment by the Registered Dietitian your patient was determined to meet criteria / has evidence of the following diagnosis/diagnoses:          [ ]  Mild Protein Calorie Malnutrition        [x ]  Moderate Protein Calorie Malnutrition, acute        [ ] Severe Protein Calorie Malnutrition        [ ] Unspecified Protein Calorie Malnutrition        [ ] Underweight / BMI <19        [ ] Morbid Obesity / BMI > 40      Findings as based on:  [ x] Comprehensive nutrition assessment - 0% intake x8 days, 1+ generalized edema  [ ] Nutrition Focused Physical Exam  [ ] Other:       Nutrition Plan/Recommendations:    1) Defer initiation of feeding to medical team. Patient has been without oral intake for ~8 days at this time. Prior to initiating enteral nutrition, recommend administer multivitamin + thiamine to prevent refeeding syndrome. Monitor electrolytes closely and replete as needed.  2) As medically feasible, recommend Glucerna 1.2 at 10 ml/hr, increasing by 10 ml/hr q4-6 hours or as tolerated to goal rate of 60 ml/hr x 24 hours. At goal, regimen provides 1440 ml formula, 1728 kcal, 86.4 gm protein, 1159 ml free water (meeting 18.3 kcal/kg, 0.9 gm/kg using dosing weight 94.3 kg).       PROVIDER Section:     By signing this assessment you are acknowledging and agree with the diagnosis/diagnoses assigned by the Registered Dietitian    Comments:

## 2020-02-19 NOTE — PROGRESS NOTE ADULT - ATTENDING COMMENTS
Acute encephalopathy, unspecified.   No clear single etiology at this point in time. Likely multifactorial. Patient with severe hypothyroidism and SDH on imaging. per neuro this could also be frontotemporal dementia given severe behavior disturbances. Head imaging without sinus venous thrombosis and  CSF autoimmune and paraneoplastic panel negative. Pt found to have UTI but unlikely cause of encephalopathy. Psych believes it is metabolic but recommended risperdone which pts son refused, and now also recommending valproic acid prn for agitation (but there are some concerns it has given her lethargy). on last few days pt has been more somnolent rather than agitated--also with twitching activity. veeg is planned for today. await neuro follow up. Neuro last saw pt 2/15 and psych last saw pt 2/14.   pt too lethargic to participate with speech/swallow. it has been several days without nutrition- will need NG tube to start tube feeds. pt son agreeable. eventual dispo is [facility] if mental status were to improve for pt to participate.

## 2020-02-19 NOTE — PROGRESS NOTE ADULT - SUBJECTIVE AND OBJECTIVE BOX
***************************************************************  Logan Bryan, PGY1  Internal Medicine   pager: [phone]  ***************************************************************    AFTAB BASS  64y  MRN: [id number]    Patient is a 64y old  Female who presents with a chief complaint of change in mental status (18 Feb 2020 12:21)      Subjective: no events ON. Denies fever, CP, SOB, abn pain, N/V, dysuria. Tolerating diet.      MEDICATIONS  (STANDING):  acetaminophen  IVPB .. 1000 milliGRAM(s) IV Intermittent once  budesonide  80 MICROgram(s)/formoterol 4.5 MICROgram(s) Inhaler 2 Puff(s) Inhalation two times a day  dextrose 5% + sodium chloride 0.45%. 1000 milliLiter(s) (50 mL/Hr) IV Continuous <Continuous>  dextrose 5%. 1000 milliLiter(s) (50 mL/Hr) IV Continuous <Continuous>  dextrose 50% Injectable 12.5 Gram(s) IV Push once  dextrose 50% Injectable 25 Gram(s) IV Push once  dextrose 50% Injectable 25 Gram(s) IV Push once  influenza   Vaccine 0.5 milliLiter(s) IntraMuscular once  insulin glargine Injectable (LANTUS) 14 Unit(s) SubCutaneous every morning  insulin lispro (HumaLOG) corrective regimen sliding scale   SubCutaneous every 6 hours  levothyroxine Injectable 87.5 MICROGram(s) IV Push at bedtime  methylPREDNISolone sodium succinate Injectable 12.5 milliGRAM(s) IV Push daily  metoprolol tartrate Injectable 2.5 milliGRAM(s) IV Push every 6 hours  pantoprazole  Injectable 40 milliGRAM(s) IV Push daily  primidone 50 milliGRAM(s) Oral at bedtime  simvastatin 20 milliGRAM(s) Oral at bedtime    MEDICATIONS  (PRN):  acetaminophen  Suppository .. 650 milliGRAM(s) Rectal every 6 hours PRN Temp greater or equal to 38C (100.4F)  albuterol/ipratropium for Nebulization 3 milliLiter(s) Nebulizer every 6 hours PRN Shortness of Breath and/or Wheezing  dextrose 40% Gel 15 Gram(s) Oral once PRN Blood Glucose LESS THAN 70 milliGRAM(s)/deciLiter  glucagon  Injectable 1 milliGRAM(s) IntraMuscular once PRN Glucose <70 milliGRAM(s)/deciLiter  melatonin 3 milliGRAM(s) Oral at bedtime PRN Insomnia  morphine  - Injectable 4 milliGRAM(s) IV Push every 6 hours PRN Moderate Pain (4 - 6)      Objective:    Vitals: Vital Signs Last 24 Hrs  T(C): 36.9 (02-19-20 @ 03:58), Max: 37.2 (02-18-20 @ 12:49)  T(F): 98.5 (02-19-20 @ 03:58), Max: 98.9 (02-18-20 @ 12:49)  HR: 82 (02-19-20 @ 03:58) (76 - 93)  BP: 137/88 (02-19-20 @ 03:58) (137/88 - 161/89)  BP(mean): --  RR: 18 (02-19-20 @ 03:58) (18 - 20)  SpO2: 98% (02-19-20 @ 03:58) (86% - 100%)            I&O's Summary    18 Feb 2020 07:01  -  19 Feb 2020 07:00  --------------------------------------------------------  IN: 700 mL / OUT: 250 mL / NET: 450 mL      PHYSICAL EXAM:  GENERAL: lethargic appearing  HEENT: PERRL, no scleral icterus, no head and neck lad   CHEST/LUNG: CTAB, no wheezing, rhonchi   HEART: RRR no murmurs   ABDOMEN: soft, nondistended, normoactive, nontender to palpation, bandage clear, dry and intact   SKIN: Old anterior abdominal wall wound appears chronic w/o s&s of infection (nonerythematous, no fluctuance)  NERVOUS SYSTEM: Alert & Oriented X 0       LABS:  02-18    148<H>  |  109<H>  |  8   ----------------------------<  117<H>  3.6   |  21<L>  |  0.89  02-17    143  |  104  |  8   ----------------------------<  188<H>  3.9   |  21<L>  |  0.87  02-16    144  |  106  |  13  ----------------------------<  184<H>  4.1   |  18<L>  |  0.91    Ca    9.2      18 Feb 2020 07:00  Ca    9.2      17 Feb 2020 09:56  Ca    9.2      16 Feb 2020 10:29  Phos  2.6     02-18  Mg     2.0     02-18    TPro  7.0  /  Alb  3.8  /  TBili  0.2  /  DBili  x   /  AST  12  /  ALT  13  /  AlkPhos  74  02-18  TPro  7.2  /  Alb  3.8  /  TBili  0.2  /  DBili  x   /  AST  16  /  ALT  17  /  AlkPhos  76  02-16                                              10.4   9.82  )-----------( 405      ( 18 Feb 2020 07:00 )             34.5                         10.8   10.86 )-----------( 371      ( 17 Feb 2020 09:56 )             35.2                         10.1   10.04 )-----------( 377      ( 16 Feb 2020 10:29 )             33.6     CAPILLARY BLOOD GLUCOSE      POCT Blood Glucose.: 128 mg/dL (19 Feb 2020 06:34)  POCT Blood Glucose.: 130 mg/dL (19 Feb 2020 00:01)  POCT Blood Glucose.: 150 mg/dL (18 Feb 2020 18:12)  POCT Blood Glucose.: 185 mg/dL (18 Feb 2020 12:43)  POCT Blood Glucose.: 173 mg/dL (18 Feb 2020 11:29)  POCT Blood Glucose.: 124 mg/dL (18 Feb 2020 08:41)      RADIOLOGY & ADDITIONAL TESTS:    Imaging Personally Reviewed:  [x ] YES  [ ] NO    Consultants involved in case:   Consultant(s) Notes Reviewed:  [ x] YES  [ ] NO:   Care Discussed with Consultants/Other Providers [x ] YES  [ ] NO ***************************************************************  Logan Bryan, PGY1  Internal Medicine   pager: [phone]  ***************************************************************    AFTAB BASS  64y  MRN: [id number]    Patient is a 64y old  Female who presents with a chief complaint of change in mental status (18 Feb 2020 12:21)      Subjective: no events ON. This morning patient is very lethargic. Opens eyes when prompted. Non-verbal, not responding to questions. RLE shaking noted. Patient's son is at the bedside who states that she has a history of shaking in the lower extremities. POCT . Patient has refused home medication of primidone for the last 9 days.     MEDICATIONS  (STANDING):  acetaminophen  IVPB .. 1000 milliGRAM(s) IV Intermittent once  budesonide  80 MICROgram(s)/formoterol 4.5 MICROgram(s) Inhaler 2 Puff(s) Inhalation two times a day  dextrose 5% + sodium chloride 0.45%. 1000 milliLiter(s) (50 mL/Hr) IV Continuous <Continuous>  dextrose 5%. 1000 milliLiter(s) (50 mL/Hr) IV Continuous <Continuous>  dextrose 50% Injectable 12.5 Gram(s) IV Push once  dextrose 50% Injectable 25 Gram(s) IV Push once  dextrose 50% Injectable 25 Gram(s) IV Push once  influenza   Vaccine 0.5 milliLiter(s) IntraMuscular once  insulin glargine Injectable (LANTUS) 14 Unit(s) SubCutaneous every morning  insulin lispro (HumaLOG) corrective regimen sliding scale   SubCutaneous every 6 hours  levothyroxine Injectable 87.5 MICROGram(s) IV Push at bedtime  methylPREDNISolone sodium succinate Injectable 12.5 milliGRAM(s) IV Push daily  metoprolol tartrate Injectable 2.5 milliGRAM(s) IV Push every 6 hours  pantoprazole  Injectable 40 milliGRAM(s) IV Push daily  primidone 50 milliGRAM(s) Oral at bedtime  simvastatin 20 milliGRAM(s) Oral at bedtime    MEDICATIONS  (PRN):  acetaminophen  Suppository .. 650 milliGRAM(s) Rectal every 6 hours PRN Temp greater or equal to 38C (100.4F)  albuterol/ipratropium for Nebulization 3 milliLiter(s) Nebulizer every 6 hours PRN Shortness of Breath and/or Wheezing  dextrose 40% Gel 15 Gram(s) Oral once PRN Blood Glucose LESS THAN 70 milliGRAM(s)/deciLiter  glucagon  Injectable 1 milliGRAM(s) IntraMuscular once PRN Glucose <70 milliGRAM(s)/deciLiter  melatonin 3 milliGRAM(s) Oral at bedtime PRN Insomnia  morphine  - Injectable 4 milliGRAM(s) IV Push every 6 hours PRN Moderate Pain (4 - 6)      Objective:    Vitals: Vital Signs Last 24 Hrs  T(C): 36.9 (02-19-20 @ 03:58), Max: 37.2 (02-18-20 @ 12:49)  T(F): 98.5 (02-19-20 @ 03:58), Max: 98.9 (02-18-20 @ 12:49)  HR: 82 (02-19-20 @ 03:58) (76 - 93)  BP: 137/88 (02-19-20 @ 03:58) (137/88 - 161/89)  BP(mean): --  RR: 18 (02-19-20 @ 03:58) (18 - 20)  SpO2: 98% (02-19-20 @ 03:58) (86% - 100%)            I&O's Summary    18 Feb 2020 07:01  -  19 Feb 2020 07:00  --------------------------------------------------------  IN: 700 mL / OUT: 250 mL / NET: 450 mL      PHYSICAL EXAM:  GENERAL: lethargic appearing  HEENT: PERRL, no scleral icterus, no head and neck lad   CHEST/LUNG: CTAB, no wheezing, rhonchi   HEART: RRR no murmurs   ABDOMEN: soft, nondistended, normoactive, nontender to palpation, bandage clear, dry and intact   SKIN: Old anterior abdominal wall wound appears chronic w/o s&s of infection (nonerythematous, no fluctuance)  NERVOUS SYSTEM: Alert & Oriented X 0, non-verbal       LABS:  02-18    148<H>  |  109<H>  |  8   ----------------------------<  117<H>  3.6   |  21<L>  |  0.89  02-17    143  |  104  |  8   ----------------------------<  188<H>  3.9   |  21<L>  |  0.87  02-16    144  |  106  |  13  ----------------------------<  184<H>  4.1   |  18<L>  |  0.91    Ca    9.2      18 Feb 2020 07:00  Ca    9.2      17 Feb 2020 09:56  Ca    9.2      16 Feb 2020 10:29  Phos  2.6     02-18  Mg     2.0     02-18    TPro  7.0  /  Alb  3.8  /  TBili  0.2  /  DBili  x   /  AST  12  /  ALT  13  /  AlkPhos  74  02-18  TPro  7.2  /  Alb  3.8  /  TBili  0.2  /  DBili  x   /  AST  16  /  ALT  17  /  AlkPhos  76  02-16                                              10.4   9.82  )-----------( 405      ( 18 Feb 2020 07:00 )             34.5                         10.8   10.86 )-----------( 371      ( 17 Feb 2020 09:56 )             35.2                         10.1   10.04 )-----------( 377      ( 16 Feb 2020 10:29 )             33.6     CAPILLARY BLOOD GLUCOSE      POCT Blood Glucose.: 128 mg/dL (19 Feb 2020 06:34)  POCT Blood Glucose.: 130 mg/dL (19 Feb 2020 00:01)  POCT Blood Glucose.: 150 mg/dL (18 Feb 2020 18:12)  POCT Blood Glucose.: 185 mg/dL (18 Feb 2020 12:43)  POCT Blood Glucose.: 173 mg/dL (18 Feb 2020 11:29)  POCT Blood Glucose.: 124 mg/dL (18 Feb 2020 08:41)      RADIOLOGY & ADDITIONAL TESTS:    Imaging Personally Reviewed:  [x ] YES  [ ] NO    Consultants involved in case:   Consultant(s) Notes Reviewed:  [ x] YES  [ ] NO:   Care Discussed with Consultants/Other Providers [x ] YES  [ ] NO

## 2020-02-19 NOTE — EEG REPORT - NS EEG TEXT BOX
AFT[facility] [id number]     Study Date: 		02-19-20    ROUTINE EEG    Technical Information:			  		  Placement and Labeling of Electrodes:  The EEG was performed utilizing 20 channels referential EEG connections (coronal over temporal over parasagittal montage) using all standard 10-20 electrode placements with EKG.  Recording was at a sampling rate of 256 samples per second per channel.  Time synchronized digital video recording was done simultaneously with EEG recording.  A low light infrared camera was used for low light recording.  Moy and seizure detection algorithms were utilized.    CSA Technical Component:  Quantitative EEG analysis using a separate Compressed Spectral Array (CSA) software package was conducted in real-time and run at bedside after set up by the technician, digitally displaying the power of electrographic frequencies included in the 1-30Hz band using a graded color map.  This data was reviewed and interpreted independently, and is reported in a separate section below.    --------------------------------------------------------------------------------------------------  History:  CC/ TEJA Patient is a 64y old  Female who presents with a chief complaint of change in mental status (19 Feb 2020 07:37)    PERTINENT MEDICATIONS  (STANDING):  none    --------------------------------------------------------------------------------------------------  Study Interpretation:    [[[Abbreviation Key:  PDR=alpha rhythm/posterior dominant rhythm. A-P=anterior posterior gradient.  Amplitude: ‘very low’:<20; ‘low’:20-50; ‘medium’:; ‘high’:>200uV.  Persistence for periodic/rhythmic patterns (% of epoch) ‘rare’:<1%; ‘occasional’:1-10%; ‘frequent’:10-50%; ‘abundant’:50-90%; ‘continuous’:>90%.  Persistence for sporadic discharges: ‘rare’:<1/hr; ‘occasional’:1/min-1/hr; ‘frequent’:>1/min; ‘abundant’:>1/10 sec.  GRDA=generalized rhythmic delta activity, LRDA=lateralized rhythmic delta activity, TIRDA=temporal intermittent rhythmic delta activity, FIRDA=frontal intermittent rhythmic activity. LPD=PLED=lateralized periodic discharges, GPD=generalized periodic discharges, BiPDs=BiPLEDs=bilateral independent periodic epileptiform discharges, SIRPID=stimulus induced rhythmic, periodic, or ictal appearing discharges.  Modifiers: +F=with fast component, +S=with spike component, +R=with rhythmic component.  S-B=burst suppression pattern.  Max=maximal. N1-drowsy, N2-stage II sleep, N3-slow wave sleep.  HV=hyperventilation, PS=photic stimulation]]]    FINDINGS:  The background was continuous, spontaneously variable and reactive.  During wakefulness, the posteriorly dominant rhythm was poorly modulated.      There was more diffuse irregular theta and delta activity present.    Sleep Background:  Stage II sleep transients were not recorded.    Epileptiform Activity:   No epileptiform discharges were present.    Events:  No clinical events were recorded.  No seizures were recorded.    Activation Procedures:   -Hyperventilation was not performed.    -Photic stimulation was not performed.    Artifacts:  Intermittent myogenic and movement artifacts were noted.    ECG:  The heart rate on single channel ECG at baseline was predominantly near BPM = 60-70  -----------------------------------------------------------------------------------------------------    EEG Classification / Summary:  Abnormal EEG study  Moderate background slowing    -----------------------------------------------------------------------------------------------------    Clinical Impression:  Moderate diffuse or multifocal cerebral dysfunction, not specific as to etiology.  There were no epileptiform abnormalities recorded.      -------------------------------------------------------------------------------------------------------  Jake Rea DO  Epilepsy Fellow, [facility] [place] [facility] [id number]     Study Date: 		02-19-20    ROUTINE EEG    Technical Information:			  		  Placement and Labeling of Electrodes:  The EEG was performed utilizing 20 channels referential EEG connections (coronal over temporal over parasagittal montage) using all standard 10-20 electrode placements with EKG.  Recording was at a sampling rate of 256 samples per second per channel.  Time synchronized digital video recording was done simultaneously with EEG recording.  A low light infrared camera was used for low light recording.  Moy and seizure detection algorithms were utilized.    CSA Technical Component:  Quantitative EEG analysis using a separate Compressed Spectral Array (CSA) software package was conducted in real-time and run at bedside after set up by the technician, digitally displaying the power of electrographic frequencies included in the 1-30Hz band using a graded color map.  This data was reviewed and interpreted independently, and is reported in a separate section below.    --------------------------------------------------------------------------------------------------  History:  CC/ TEJA Patient is a 64y old  Female who presents with a chief complaint of change in mental status (19 Feb 2020 07:37)    PERTINENT MEDICATIONS  (STANDING):  none    --------------------------------------------------------------------------------------------------  Study Interpretation:    [[[Abbreviation Key:  PDR=alpha rhythm/posterior dominant rhythm. A-P=anterior posterior gradient.  Amplitude: ‘very low’:<20; ‘low’:20-50; ‘medium’:; ‘high’:>200uV.  Persistence for periodic/rhythmic patterns (% of epoch) ‘rare’:<1%; ‘occasional’:1-10%; ‘frequent’:10-50%; ‘abundant’:50-90%; ‘continuous’:>90%.  Persistence for sporadic discharges: ‘rare’:<1/hr; ‘occasional’:1/min-1/hr; ‘frequent’:>1/min; ‘abundant’:>1/10 sec.  GRDA=generalized rhythmic delta activity, LRDA=lateralized rhythmic delta activity, TIRDA=temporal intermittent rhythmic delta activity, FIRDA=frontal intermittent rhythmic activity. LPD=PLED=lateralized periodic discharges, GPD=generalized periodic discharges, BiPDs=BiPLEDs=bilateral independent periodic epileptiform discharges, SIRPID=stimulus induced rhythmic, periodic, or ictal appearing discharges.  Modifiers: +F=with fast component, +S=with spike component, +R=with rhythmic component.  S-B=burst suppression pattern.  Max=maximal. N1-drowsy, N2-stage II sleep, N3-slow wave sleep.  HV=hyperventilation, PS=photic stimulation]]]    FINDINGS:  The background was continuous, spontaneously variable and reactive.  During wakefulness, the posteriorly dominant rhythm was poorly modulated at 6.5 to 7Hz.      There was more diffuse irregular theta and polymorphic delta activity present.    Sleep Background:  Stage II sleep transients were not recorded.    Epileptiform Activity:   No epileptiform discharges were present.    Events:  No clinical events were recorded.  No seizures were recorded.    Activation Procedures:   -Hyperventilation was not performed.    -Photic stimulation was not performed.    Artifacts:  Intermittent myogenic and movement artifacts were noted.    ECG:  The heart rate on single channel ECG at baseline was predominantly near BPM = 80-90  -----------------------------------------------------------------------------------------------------    EEG Classification / Summary:  Abnormal EEG study  Moderate background slowing    -----------------------------------------------------------------------------------------------------    Clinical Impression:  Moderate diffuse or multifocal cerebral dysfunction, not specific as to etiology.  There were no epileptiform abnormalities recorded.      -------------------------------------------------------------------------------------------------------  Jake Rea DO  Epilepsy Fellow, [facility] [place] [facility] [id number]     Study Date: 		02-19-20    ROUTINE EEG    Technical Information:			  		  Placement and Labeling of Electrodes:  The EEG was performed utilizing 20 channels referential EEG connections (coronal over temporal over parasagittal montage) using all standard 10-20 electrode placements with EKG.  Recording was at a sampling rate of 256 samples per second per channel.  Time synchronized digital video recording was done simultaneously with EEG recording.  A low light infrared camera was used for low light recording.  Moy and seizure detection algorithms were utilized.    CSA Technical Component:  Quantitative EEG analysis using a separate Compressed Spectral Array (CSA) software package was conducted in real-time and run at bedside after set up by the technician, digitally displaying the power of electrographic frequencies included in the 1-30Hz band using a graded color map.  This data was reviewed and interpreted independently, and is reported in a separate section below.    --------------------------------------------------------------------------------------------------  History:  CC/ TEJA Patient is a 64y old  Female who presents with a chief complaint of change in mental status (19 Feb 2020 07:37)    PERTINENT MEDICATIONS  (STANDING):  none    --------------------------------------------------------------------------------------------------  Study Interpretation:    [[[Abbreviation Key:  PDR=alpha rhythm/posterior dominant rhythm. A-P=anterior posterior gradient.  Amplitude: ‘very low’:<20; ‘low’:20-50; ‘medium’:; ‘high’:>200uV.  Persistence for periodic/rhythmic patterns (% of epoch) ‘rare’:<1%; ‘occasional’:1-10%; ‘frequent’:10-50%; ‘abundant’:50-90%; ‘continuous’:>90%.  Persistence for sporadic discharges: ‘rare’:<1/hr; ‘occasional’:1/min-1/hr; ‘frequent’:>1/min; ‘abundant’:>1/10 sec.  GRDA=generalized rhythmic delta activity, LRDA=lateralized rhythmic delta activity, TIRDA=temporal intermittent rhythmic delta activity, FIRDA=frontal intermittent rhythmic activity. LPD=PLED=lateralized periodic discharges, GPD=generalized periodic discharges, BiPDs=BiPLEDs=bilateral independent periodic epileptiform discharges, SIRPID=stimulus induced rhythmic, periodic, or ictal appearing discharges.  Modifiers: +F=with fast component, +S=with spike component, +R=with rhythmic component.  S-B=burst suppression pattern.  Max=maximal. N1-drowsy, N2-stage II sleep, N3-slow wave sleep.  HV=hyperventilation, PS=photic stimulation]]]    FINDINGS:  The background was continuous, spontaneously variable and reactive.  During wakefulness, the posteriorly dominant rhythm was well-modulated at 9-10 Hz. Background consisted predominantly of alpha, theta and diffuse excess delta activtivies.     Sleep Background:  Drowsiness was characterized by fragmentation, attenuation, and slowing of the background activity.    Stage II sleep transients were not recorded.     Epileptiform Activity:   No epileptiform discharges were present.    Events:  No clinical events were recorded.  No seizures were recorded.    Activation Procedures:   -Hyperventilation was not performed.    -Photic stimulation was not performed.    Artifacts:  Intermittent myogenic and movement artifacts were noted.    ECG:  The heart rate on single channel ECG at baseline was predominantly near BPM = 80-90  -----------------------------------------------------------------------------------------------------    EEG Classification / Summary:  Abnormal EEG study in awake and very brief drowsy states.  Mild background slowing    -----------------------------------------------------------------------------------------------------    Clinical Impression:  Mild diffuse or multifocal cerebral dysfunction, not specific as to etiology.  There were no epileptiform abnormalities recorded.      -------------------------------------------------------------------------------------------------------  Jake Rea DO  Epilepsy Fellow, [facility]    Kelli Mendez MD  Attending Physician, [facility] [place]

## 2020-02-19 NOTE — PROGRESS NOTE ADULT - PROBLEM SELECTOR PLAN 1
Unclear etiology. Per family this is rapidly progressing. Patient had some minor memory impairments roughly 30 days prior to admission when she started suffering from memory loss and paranoid delusions.   -Likely psychosis/delirium 2/2 hypothyroidism. PE significant for myxedema. , free T4 0.3. Endocrine following. Now on IV synthroid. Ddx also includes FT dementia.  -MRI brain 3mm right sided SDH no neurosurgical intervention   -LP 2/6 CSF studies wnl.   -Paraneoplastic panel neg  -UTI treated. Unlikely cause of AMS    -VEEG ordered and still pending  -psych following risperidone 0.25mg BID, however son refusing   - rheumatology state rheum origin less likely given neg for inflammatory changes; serum ALESSIA, ZAFAR, ribosomal P, dsdna neg, NMDRAb neg   - DVT ppx changed to SCDs given SDH  -f/u psych regarding possible catatonia Unclear etiology. Per family this is rapidly progressing. Patient had some minor memory impairments roughly 30 days prior to admission when she started suffering from memory loss and paranoid delusions.   -Ddx includes psychosis/delirium 2/2 hypothyroidism. PE significant for myxedema. , free T4 0.3. Endocrine following. Now on IV synthroid. Ddx also includes FT dementia and CJD.   -MRI brain 3mm right sided SDH no neurosurgical intervention   -LP 2/6 CSF studies wnl.   -Paraneoplastic panel neg  -UTI treated. Unlikely cause of AMS    -VEEG ordered and still pending  -psych following risperidone 0.25mg BID, however son refusing   - rheumatology state rheum origin less likely given neg for inflammatory changes; serum ALESSIA, ZAFAR, ribosomal P, dsdna neg, NMDRAb neg   - DVT ppx changed to SCDs given SDH  -psych states catatonia unlikely, can use trial of ativan  -noted to have leg shaking this morning, f/u VEEG and neuro recs

## 2020-02-20 LAB
ALBUMIN SERPL ELPH-MCNC: 3.6 G/DL — SIGNIFICANT CHANGE UP (ref 3.3–5)
ALP SERPL-CCNC: 65 U/L — SIGNIFICANT CHANGE UP (ref 40–120)
ALT FLD-CCNC: 11 U/L — SIGNIFICANT CHANGE UP (ref 10–45)
ANION GAP SERPL CALC-SCNC: 16 MMOL/L — SIGNIFICANT CHANGE UP (ref 5–17)
APPEARANCE UR: ABNORMAL
AST SERPL-CCNC: 11 U/L — SIGNIFICANT CHANGE UP (ref 10–40)
BACTERIA # UR AUTO: ABNORMAL
BASE EXCESS BLDV CALC-SCNC: -2.8 MMOL/L — LOW (ref -2–2)
BASOPHILS # BLD AUTO: 0.06 K/UL — SIGNIFICANT CHANGE UP (ref 0–0.2)
BASOPHILS NFR BLD AUTO: 0.6 % — SIGNIFICANT CHANGE UP (ref 0–2)
BILIRUB SERPL-MCNC: 0.1 MG/DL — LOW (ref 0.2–1.2)
BILIRUB UR-MCNC: NEGATIVE — SIGNIFICANT CHANGE UP
BUN SERPL-MCNC: 5 MG/DL — LOW (ref 7–23)
CA-I SERPL-SCNC: 1.21 MMOL/L — SIGNIFICANT CHANGE UP (ref 1.12–1.3)
CALCIUM SERPL-MCNC: 9 MG/DL — SIGNIFICANT CHANGE UP (ref 8.4–10.5)
CHLORIDE BLDV-SCNC: 112 MMOL/L — HIGH (ref 96–108)
CHLORIDE SERPL-SCNC: 105 MMOL/L — SIGNIFICANT CHANGE UP (ref 96–108)
CO2 BLDV-SCNC: 25 MMOL/L — SIGNIFICANT CHANGE UP (ref 22–30)
CO2 SERPL-SCNC: 22 MMOL/L — SIGNIFICANT CHANGE UP (ref 22–31)
COLOR SPEC: YELLOW — SIGNIFICANT CHANGE UP
COMMENT - URINE: SIGNIFICANT CHANGE UP
CREAT SERPL-MCNC: 0.8 MG/DL — SIGNIFICANT CHANGE UP (ref 0.5–1.3)
DIFF PNL FLD: ABNORMAL
EOSINOPHIL # BLD AUTO: 0.22 K/UL — SIGNIFICANT CHANGE UP (ref 0–0.5)
EOSINOPHIL NFR BLD AUTO: 2.4 % — SIGNIFICANT CHANGE UP (ref 0–6)
EPI CELLS # UR: 4 — SIGNIFICANT CHANGE UP
GAS PNL BLDV: 144 MMOL/L — SIGNIFICANT CHANGE UP (ref 135–145)
GAS PNL BLDV: SIGNIFICANT CHANGE UP
GLUCOSE BLDC GLUCOMTR-MCNC: 101 MG/DL — HIGH (ref 70–99)
GLUCOSE BLDC GLUCOMTR-MCNC: 146 MG/DL — HIGH (ref 70–99)
GLUCOSE BLDC GLUCOMTR-MCNC: 193 MG/DL — HIGH (ref 70–99)
GLUCOSE BLDC GLUCOMTR-MCNC: 195 MG/DL — HIGH (ref 70–99)
GLUCOSE BLDC GLUCOMTR-MCNC: 88 MG/DL — SIGNIFICANT CHANGE UP (ref 70–99)
GLUCOSE BLDV-MCNC: 177 MG/DL — HIGH (ref 70–99)
GLUCOSE SERPL-MCNC: 107 MG/DL — HIGH (ref 70–99)
GLUCOSE UR QL: NEGATIVE — SIGNIFICANT CHANGE UP
HCO3 BLDV-SCNC: 24 MMOL/L — SIGNIFICANT CHANGE UP (ref 21–29)
HCT VFR BLD CALC: 33.1 % — LOW (ref 34.5–45)
HCT VFR BLDA CALC: 36 % — LOW (ref 39–50)
HGB BLD CALC-MCNC: 11.5 G/DL — SIGNIFICANT CHANGE UP (ref 11.5–15.5)
HGB BLD-MCNC: 9.9 G/DL — LOW (ref 11.5–15.5)
HYALINE CASTS # UR AUTO: 0 /LPF — SIGNIFICANT CHANGE UP (ref 0–7)
IMM GRANULOCYTES NFR BLD AUTO: 0.6 % — SIGNIFICANT CHANGE UP (ref 0–1.5)
KETONES UR-MCNC: ABNORMAL
LACTATE BLDV-MCNC: 1.1 MMOL/L — SIGNIFICANT CHANGE UP (ref 0.7–2)
LEUKOCYTE ESTERASE UR-ACNC: ABNORMAL
LYMPHOCYTES # BLD AUTO: 3.24 K/UL — SIGNIFICANT CHANGE UP (ref 1–3.3)
LYMPHOCYTES # BLD AUTO: 35 % — SIGNIFICANT CHANGE UP (ref 13–44)
MAGNESIUM SERPL-MCNC: 1.8 MG/DL — SIGNIFICANT CHANGE UP (ref 1.6–2.6)
MCHC RBC-ENTMCNC: 29.9 GM/DL — LOW (ref 32–36)
MCHC RBC-ENTMCNC: 30.5 PG — SIGNIFICANT CHANGE UP (ref 27–34)
MCV RBC AUTO: 101.8 FL — HIGH (ref 80–100)
MONOCYTES # BLD AUTO: 0.96 K/UL — HIGH (ref 0–0.9)
MONOCYTES NFR BLD AUTO: 10.4 % — SIGNIFICANT CHANGE UP (ref 2–14)
NEUTROPHILS # BLD AUTO: 4.73 K/UL — SIGNIFICANT CHANGE UP (ref 1.8–7.4)
NEUTROPHILS NFR BLD AUTO: 51 % — SIGNIFICANT CHANGE UP (ref 43–77)
NITRITE UR-MCNC: NEGATIVE — SIGNIFICANT CHANGE UP
NRBC # BLD: 0 /100 WBCS — SIGNIFICANT CHANGE UP (ref 0–0)
OTHER CELLS CSF MANUAL: 11 ML/DL — LOW (ref 18–22)
PCO2 BLDV: 50 MMHG — SIGNIFICANT CHANGE UP (ref 35–50)
PH BLDV: 7.29 — LOW (ref 7.35–7.45)
PH UR: 6 — SIGNIFICANT CHANGE UP (ref 5–8)
PHOSPHATE SERPL-MCNC: 1.8 MG/DL — LOW (ref 2.5–4.5)
PLATELET # BLD AUTO: 439 K/UL — HIGH (ref 150–400)
PO2 BLDV: 41 MMHG — SIGNIFICANT CHANGE UP (ref 25–45)
POTASSIUM BLDV-SCNC: 3.6 MMOL/L — SIGNIFICANT CHANGE UP (ref 3.5–5.3)
POTASSIUM SERPL-MCNC: 3 MMOL/L — LOW (ref 3.5–5.3)
POTASSIUM SERPL-SCNC: 3 MMOL/L — LOW (ref 3.5–5.3)
PROLACTIN SERPL-MCNC: 25.1 NG/ML — HIGH (ref 3.4–24.1)
PROT SERPL-MCNC: 6.7 G/DL — SIGNIFICANT CHANGE UP (ref 6–8.3)
PROT UR-MCNC: ABNORMAL
RBC # BLD: 3.25 M/UL — LOW (ref 3.8–5.2)
RBC # FLD: 14.3 % — SIGNIFICANT CHANGE UP (ref 10.3–14.5)
RBC CASTS # UR COMP ASSIST: 6 /HPF — HIGH (ref 0–4)
SAO2 % BLDV: 69 % — SIGNIFICANT CHANGE UP (ref 67–88)
SODIUM SERPL-SCNC: 143 MMOL/L — SIGNIFICANT CHANGE UP (ref 135–145)
SP GR SPEC: 1.01 — SIGNIFICANT CHANGE UP (ref 1.01–1.02)
UROBILINOGEN FLD QL: NEGATIVE — SIGNIFICANT CHANGE UP
WBC # BLD: 9.27 K/UL — SIGNIFICANT CHANGE UP (ref 3.8–10.5)
WBC # FLD AUTO: 9.27 K/UL — SIGNIFICANT CHANGE UP (ref 3.8–10.5)
WBC UR QL: >50 /HPF — SIGNIFICANT CHANGE UP (ref 0–5)

## 2020-02-20 PROCEDURE — 71045 X-RAY EXAM CHEST 1 VIEW: CPT | Mod: 26,77

## 2020-02-20 PROCEDURE — 99232 SBSQ HOSP IP/OBS MODERATE 35: CPT

## 2020-02-20 PROCEDURE — 95720 EEG PHY/QHP EA INCR W/VEEG: CPT

## 2020-02-20 PROCEDURE — 99233 SBSQ HOSP IP/OBS HIGH 50: CPT | Mod: GC

## 2020-02-20 PROCEDURE — 71045 X-RAY EXAM CHEST 1 VIEW: CPT | Mod: 26

## 2020-02-20 PROCEDURE — 70450 CT HEAD/BRAIN W/O DYE: CPT | Mod: 26

## 2020-02-20 PROCEDURE — 74177 CT ABD & PELVIS W/CONTRAST: CPT | Mod: 26

## 2020-02-20 RX ORDER — PIPERACILLIN AND TAZOBACTAM 4; .5 G/20ML; G/20ML
3.38 INJECTION, POWDER, LYOPHILIZED, FOR SOLUTION INTRAVENOUS ONCE
Refills: 0 | Status: COMPLETED | OUTPATIENT
Start: 2020-02-20 | End: 2020-02-20

## 2020-02-20 RX ORDER — TETRACAINE/BENZOCAINE/BUTAMBEN 2%-14%-2%
1 OINTMENT (GRAM) TOPICAL ONCE
Refills: 0 | Status: COMPLETED | OUTPATIENT
Start: 2020-02-20 | End: 2020-02-20

## 2020-02-20 RX ORDER — TETRACAINE/BENZOCAINE/BUTAMBEN 2%-14%-2%
1 OINTMENT (GRAM) TOPICAL ONCE
Refills: 0 | Status: DISCONTINUED | OUTPATIENT
Start: 2020-02-20 | End: 2020-02-23

## 2020-02-20 RX ORDER — MULTIVIT-MIN/FERROUS GLUCONATE 9 MG/15 ML
15 LIQUID (ML) ORAL DAILY
Refills: 0 | Status: DISCONTINUED | OUTPATIENT
Start: 2020-02-20 | End: 2020-03-01

## 2020-02-20 RX ORDER — PIPERACILLIN AND TAZOBACTAM 4; .5 G/20ML; G/20ML
3.38 INJECTION, POWDER, LYOPHILIZED, FOR SOLUTION INTRAVENOUS EVERY 8 HOURS
Refills: 0 | Status: DISCONTINUED | OUTPATIENT
Start: 2020-02-20 | End: 2020-02-21

## 2020-02-20 RX ORDER — POTASSIUM PHOSPHATE, MONOBASIC POTASSIUM PHOSPHATE, DIBASIC 236; 224 MG/ML; MG/ML
30 INJECTION, SOLUTION INTRAVENOUS ONCE
Refills: 0 | Status: COMPLETED | OUTPATIENT
Start: 2020-02-20 | End: 2020-02-20

## 2020-02-20 RX ORDER — POTASSIUM CHLORIDE 20 MEQ
10 PACKET (EA) ORAL
Refills: 0 | Status: COMPLETED | OUTPATIENT
Start: 2020-02-20 | End: 2020-02-20

## 2020-02-20 RX ORDER — THIAMINE MONONITRATE (VIT B1) 100 MG
100 TABLET ORAL DAILY
Refills: 0 | Status: DISCONTINUED | OUTPATIENT
Start: 2020-02-20 | End: 2020-03-01

## 2020-02-20 RX ADMIN — Medication 87.5 MICROGRAM(S): at 01:03

## 2020-02-20 RX ADMIN — Medication 100 MILLIEQUIVALENT(S): at 11:58

## 2020-02-20 RX ADMIN — PIPERACILLIN AND TAZOBACTAM 25 GRAM(S): 4; .5 INJECTION, POWDER, LYOPHILIZED, FOR SOLUTION INTRAVENOUS at 22:22

## 2020-02-20 RX ADMIN — Medication 2.5 MILLIGRAM(S): at 01:00

## 2020-02-20 RX ADMIN — Medication 2.5 MILLIGRAM(S): at 18:19

## 2020-02-20 RX ADMIN — PIPERACILLIN AND TAZOBACTAM 200 GRAM(S): 4; .5 INJECTION, POWDER, LYOPHILIZED, FOR SOLUTION INTRAVENOUS at 13:52

## 2020-02-20 RX ADMIN — Medication 1: at 13:01

## 2020-02-20 RX ADMIN — Medication 1 SPRAY(S): at 16:45

## 2020-02-20 RX ADMIN — Medication 87.5 MICROGRAM(S): at 22:22

## 2020-02-20 RX ADMIN — INSULIN GLARGINE 14 UNIT(S): 100 INJECTION, SOLUTION SUBCUTANEOUS at 06:45

## 2020-02-20 RX ADMIN — Medication 12.5 MILLIGRAM(S): at 06:28

## 2020-02-20 RX ADMIN — Medication 100 MILLIEQUIVALENT(S): at 09:50

## 2020-02-20 RX ADMIN — Medication 2.5 MILLIGRAM(S): at 11:18

## 2020-02-20 RX ADMIN — PANTOPRAZOLE SODIUM 40 MILLIGRAM(S): 20 TABLET, DELAYED RELEASE ORAL at 11:15

## 2020-02-20 RX ADMIN — POTASSIUM PHOSPHATE, MONOBASIC POTASSIUM PHOSPHATE, DIBASIC 83.33 MILLIMOLE(S): 236; 224 INJECTION, SOLUTION INTRAVENOUS at 11:18

## 2020-02-20 RX ADMIN — SODIUM CHLORIDE 50 MILLILITER(S): 9 INJECTION, SOLUTION INTRAVENOUS at 09:53

## 2020-02-20 RX ADMIN — BUDESONIDE AND FORMOTEROL FUMARATE DIHYDRATE 2 PUFF(S): 160; 4.5 AEROSOL RESPIRATORY (INHALATION) at 05:49

## 2020-02-20 RX ADMIN — Medication 100 MILLIEQUIVALENT(S): at 10:56

## 2020-02-20 RX ADMIN — Medication 2.5 MILLIGRAM(S): at 06:29

## 2020-02-20 NOTE — PROGRESS NOTE ADULT - PROBLEM SELECTOR PLAN 2
-Noted TSH repeated but level is not reliable at this time since pt recently started Levothyroxine therapy and med was not given on an empty stomach most of the time due to AMS.    -C/w levothyroxine 87.5 MICROGram(s) IVP daily

## 2020-02-20 NOTE — PROGRESS NOTE ADULT - PROBLEM SELECTOR PLAN 1
Unclear etiology. Per family this is rapidly progressing. Patient had some minor memory impairments roughly 30 days prior to admission when she started suffering from memory loss and paranoid delusions.   -Ddx includes psychosis/delirium 2/2 hypothyroidism. PE significant for myxedema. , free T4 0.3. Endocrine following. Now on IV synthroid. Ddx also includes FT dementia and CJD.   -MRI brain 3mm right sided SDH no neurosurgical intervention   -LP 2/6 CSF studies wnl.   -Paraneoplastic panel neg  -UTI treated. Unlikely cause of AMS    -VEEG ordered and still pending  -psych following risperidone 0.25mg BID, however son refusing   - rheumatology state rheum origin less likely given neg for inflammatory changes; serum ALESSIA, ZAFAR, ribosomal P, dsdna neg, NMDRAb neg   - DVT ppx changed to SCDs given SDH  -psych states catatonia unlikely, can use trial of ativan  -noted to have leg shaking this morning, f/u VEEG and neuro recs Unclear etiology. Per family this is rapidly progressing. Patient had some minor memory impairments roughly 30 days prior to admission when she started suffering from memory loss and paranoid delusions.   -Ddx includes psychosis/delirium 2/2 hypothyroidism. PE significant for myxedema. , free T4 0.3. Endocrine following. Now on IV synthroid. Ddx also includes FT dementia and CJD.   -MRI brain 3mm right sided SDH no neurosurgical intervention   -LP 2/6 CSF studies wnl.   -Paraneoplastic panel neg  -UTI s/p zosyn  -VEEG no seizure activity   - rheumatology state rheum origin less likely given neg for inflammatory changes; serum ALESSIA, ZAFAR, ribosomal P, dsdna neg, NMDRAb neg   - DVT ppx changed to SCDs given SDH  -psych states catatonia unlikely, can use trial of ativan  -2/20 full infectious work-up sent including UA, urine culture, repeat blood cultures  -repeat head CT  -CXR clear lungs

## 2020-02-20 NOTE — PROGRESS NOTE ADULT - PROBLEM SELECTOR PLAN 10
- DVT ppx: SWITCHED TO SCDS GIVEN BLEED  - Diet: NPO except meds   - Dispo: [facility]   - FYI holding primidone, simvastatin, gabapentin as of 2/12 as no IV equivalent available   - ADDENDUM: Spoke to pts family members about decision making.  The children agree that Norris will serve as the point of  and will be the final decision maker representing the family for when decisions have to be made.  They are in agreement with moving forward with sedation/potential intubation for pt to undergo further medical testing. Discussions had on 2/3/20. - Davis Kramer  Transitions of Care Status:  1.  Name of PCP:  Kristie Nails MD (PCP)  2.  PCP Contacted on Admission: [ ] Y    [x] N    3.  PCP contacted at Discharge: [ ] Y    [ ] N    [ ] N/A  4.  Post-Discharge Appointment Date and Location:  5.  Summary of Handoff given to PCP: - DVT ppx: SWITCHED TO SCDS GIVEN SDH  - Diet: NPO except meds   - Dispo: IV[facility]   - FYI holding primidone, simvastatin, gabapentin as of 2/12 as no IV equivalent available   - ADDENDUM: Spoke to pts family members about decision making.  The children agree that Norris will serve as the point of  and will be the final decision maker representing the family for when decisions have to be made.  They are in agreement with moving forward with sedation/potential intubation for pt to undergo further medical testing. Discussions had on 2/3/20. - Davis Kramer  Transitions of Care Status:  1.  Name of PCP:  Kristie Nails MD (PCP)  2.  PCP Contacted on Admission: [ ] Y    [x] N    3.  PCP contacted at Discharge: [ ] Y    [ ] N    [ ] N/A  4.  Post-Discharge Appointment Date and Location:  5.  Summary of Handoff given to PCP:

## 2020-02-20 NOTE — EEG REPORT - NS EEG TEXT BOX
AFTAB BASS [id number]     Study Date: 		02-19-20    ROUTINE EEG    Technical Information:			  		  Placement and Labeling of Electrodes:  The EEG was performed utilizing 20 channels referential EEG connections (coronal over temporal over parasagittal montage) using all standard 10-20 electrode placements with EKG.  Recording was at a sampling rate of 256 samples per second per channel.  Time synchronized digital video recording was done simultaneously with EEG recording.  A low light infrared camera was used for low light recording.  Moy and seizure detection algorithms were utilized.    CSA Technical Component:  Quantitative EEG analysis using a separate Compressed Spectral Array (CSA) software package was conducted in real-time and run at bedside after set up by the technician, digitally displaying the power of electrographic frequencies included in the 1-30Hz band using a graded color map.  This data was reviewed and interpreted independently, and is reported in a separate section below.    --------------------------------------------------------------------------------------------------  History:  CC/ TEJA Patient is a 64y old  Female who presents with a chief complaint of change in mental status (19 Feb 2020 07:37)    PERTINENT MEDICATIONS  (STANDING):  none    --------------------------------------------------------------------------------------------------  Study Interpretation:    [[[Abbreviation Key:  PDR=alpha rhythm/posterior dominant rhythm. A-P=anterior posterior gradient.  Amplitude: ‘very low’:<20; ‘low’:20-50; ‘medium’:; ‘high’:>200uV.  Persistence for periodic/rhythmic patterns (% of epoch) ‘rare’:<1%; ‘occasional’:1-10%; ‘frequent’:10-50%; ‘abundant’:50-90%; ‘continuous’:>90%.  Persistence for sporadic discharges: ‘rare’:<1/hr; ‘occasional’:1/min-1/hr; ‘frequent’:>1/min; ‘abundant’:>1/10 sec.  GRDA=generalized rhythmic delta activity, LRDA=lateralized rhythmic delta activity, TIRDA=temporal intermittent rhythmic delta activity, FIRDA=frontal intermittent rhythmic activity. LPD=PLED=lateralized periodic discharges, GPD=generalized periodic discharges, BiPDs=BiPLEDs=bilateral independent periodic epileptiform discharges, SIRPID=stimulus induced rhythmic, periodic, or ictal appearing discharges.  Modifiers: +F=with fast component, +S=with spike component, +R=with rhythmic component.  S-B=burst suppression pattern.  Max=maximal. N1-drowsy, N2-stage II sleep, N3-slow wave sleep.  HV=hyperventilation, PS=photic stimulation]]]    FINDINGS:  The background was continuous, spontaneously variable and reactive.  During wakefulness, the posteriorly dominant rhythm was well-modulated at 9-10 Hz. Background consisted predominantly of alpha, theta and diffuse excess delta activtivies.     Sleep Background:  Drowsiness was characterized by fragmentation, attenuation, and slowing of the background activity.    Stage II sleep transients were characterized by sleep spindles and K complexes.    Epileptiform Activity:   No epileptiform discharges were present.    Events:  No clinical events were recorded.  No seizures were recorded.    Activation Procedures:   -Hyperventilation was not performed.    -Photic stimulation was not performed.    Artifacts:  Intermittent myogenic and movement artifacts were noted.    ECG:  The heart rate on single channel ECG at baseline was predominantly near BPM = 80-90  -----------------------------------------------------------------------------------------------------    EEG Classification / Summary:  Abnormal EEG study in awake, drowsy and asleep states.  Mild background slowing    -----------------------------------------------------------------------------------------------------    Clinical Impression:  Mild diffuse or multifocal cerebral dysfunction, not specific as to etiology.  There were no epileptiform abnormalities recorded.      -------------------------------------------------------------------------------------------------------  Jake Rea DO  Epilepsy Fellow, [facility]    Kelli Mendez MD  Attending Physician, [facility]   COMPREHENSIVE EPILEPSY CENTER   REPORT OF CONTINUOUS VIDEO EEG     [facility]: [address], [place] [address]  [facility]: [phone] [place] [id number]  [facility]: [address] [place] [address]    Patient Name: AFTAB BASS  Age and : 64y ([birth year])  MRN #: [id number]  Location: [facility] 5MON 521   Referring Physician: Vanessa Bishop    Start Time/Date: 10:49 on 20  End Time/Date: 08:00 on 20   Duration: 21hrs and 11 min    _____________________________________________________________  STUDY INFORMATION    EEG Recording Technique:  The patient underwent continuous Video-EEG monitoring, using Telemetry System hardware on the XLTek Digital System. EEG and video data were stored on a computer hard drive with important events saved in digital archive files. The material was reviewed by a physician (electroencephalographer / epileptologist) on a daily basis. Moy and seizure detection algorithms were utilized and reviewed. An EEG Technician attended to the patient, and was available throughout daytime work hours.  The epilepsy center neurologist was available in person or on call 24-hours per day.    EEG Placement and Labeling of Electrodes:  The EEG was performed utilizing 20 channel referential EEG connections (coronal over temporal over parasagittal montage) using all standard 10-20 electrode placements with EKG, with additional electrodes placed in the inferior temporal region using the modified 10-10 montage electrode placements for elective admissions, or if deemed necessary. Recording was at a sampling rate of 256 samples per second per channel. Time synchronized digital video recording was done simultaneously with EEG recording. A low light infrared camera was used for low light recording.     _____________________________________________________________  HISTORY    Patient is a 64y old  Female who presents with a chief complaint of change in mental status (2020 08:01)        PERTINENT MEDICATIONS:  none    --------------------------------------------------------------------------------------------------  Study Interpretation:    [[[Abbreviation Key:  PDR=alpha rhythm/posterior dominant rhythm. A-P=anterior posterior gradient.  Amplitude: ‘very low’:<20; ‘low’:20-50; ‘medium’:; ‘high’:>200uV.  Persistence for periodic/rhythmic patterns (% of epoch) ‘rare’:<1%; ‘occasional’:1-10%; ‘frequent’:10-50%; ‘abundant’:50-90%; ‘continuous’:>90%.  Persistence for sporadic discharges: ‘rare’:<1/hr; ‘occasional’:1/min-1/hr; ‘frequent’:>1/min; ‘abundant’:>1/10 sec.  GRDA=generalized rhythmic delta activity, LRDA=lateralized rhythmic delta activity, TIRDA=temporal intermittent rhythmic delta activity, FIRDA=frontal intermittent rhythmic activity. LPD=PLED=lateralized periodic discharges, GPD=generalized periodic discharges, BiPDs=BiPLEDs=bilateral independent periodic epileptiform discharges, SIRPID=stimulus induced rhythmic, periodic, or ictal appearing discharges.  Modifiers: +F=with fast component, +S=with spike component, +R=with rhythmic component.  S-B=burst suppression pattern.  Max=maximal. N1-drowsy, N2-stage II sleep, N3-slow wave sleep.  HV=hyperventilation, PS=photic stimulation]]]    FINDINGS:  The background was continuous, spontaneously variable and reactive.  During wakefulness, the posteriorly dominant rhythm was well-modulated at 9-10 Hz. Background consisted predominantly of alpha, theta and diffuse excess delta activities     Sleep Background:  Drowsiness was characterized by fragmentation, attenuation, and slowing of the background activity.    Stage II sleep transients were characterized by sleep spindles and K complexes.    Epileptiform Activity:   No epileptiform discharges were present.    Events:  No clinical events were recorded.  No seizures were recorded.    Activation Procedures:   -Hyperventilation was not performed.    -Photic stimulation was not performed.    Artifacts:  Intermittent myogenic and movement artifacts were noted.    ECG:  The heart rate on single channel ECG at baseline was predominantly near BPM = 80-90  -----------------------------------------------------------------------------------------------------    EEG Classification / Summary:  Abnormal EEG study in awake, drowsy and asleep states.  Mild background slowing    -----------------------------------------------------------------------------------------------------    Clinical Impression:  Mild diffuse or multifocal cerebral dysfunction, not specific as to etiology.  There were no epileptiform abnormalities recorded.      -------------------------------------------------------------------------------------------------------  Jake Rea DO  Epilepsy Fellow, [facility]    Kelli Mendez MD  Attending Physician, [facility] [place]

## 2020-02-20 NOTE — PROGRESS NOTE ADULT - ATTENDING COMMENTS
Acute encephalopathy, unspecified.       No clear single etiology at this point in time. Likely multifactorial. Patient with severe hypothyroidism and SDH on imaging. per neuro this could also be frontotemporal dementia given severe behavior disturbances over past 6 months. Head imaging without sinus venous thrombosis and  CSF autoimmune and paraneoplastic panel negative. Pt found to have UTI but unlikely cause of encephalopathy. Psych believes it is metabolic but recommended risperdone which pts son refused. received valproic acid over weekend but per pts son it makes her very lethargic.    now pt somnolent and minimally responsive x several days. EEG showing diffuse cerebral slowing/dysfunction- no epileptiform activity.   this may be hypoactive delirium in setting of underlying dementia vs new metabolic process.  neuro and psych signed off.  will repeat infectious work up for completeness sake- UA, CXR, blood cx, as well as CT head and abdomen today (given abdominal wound)    pt too lethargic to participate with speech/swallow. it has been several days without nutrition- getting NG tube and starting tube feeds today. son agreeable. his goal is for pt to go to [facility] if she were to improved.

## 2020-02-20 NOTE — PROGRESS NOTE ADULT - PROBLEM SELECTOR PLAN 7
- Bcx + for GPR on 1/12, 1 out of 2 bottles. +[facility], no further speciation available.   - Blood cx obtained on 1/25 no growth X 5 days. Repeat 2/14 NGTD.   - vancomycin started 2/14 d/michael as repeat BCx NGTD

## 2020-02-20 NOTE — PROGRESS NOTE ADULT - PROBLEM SELECTOR PLAN 4
Patient w/ anterior abdominal wound from SBO revision   - wound care following; change dressing every 7 days; changed 2/6, 2/13  - wound base clean w/o infection Patient w/ anterior abdominal wound from SBO revision   - wound care following; change dressing every 7 days; changed 2/6, 2/13  - wound base clean w/o infection  -CT abdomen ordered to r/o cause of infection

## 2020-02-20 NOTE — PROGRESS NOTE ADULT - PROBLEM SELECTOR PLAN 5
-BG 90's 1/16 in the setting of decreased PO intake. Failed bedside swallow.  -speech and swallow eval pending   -Lantus 34 --> 20 --> 18U --> 14U  -holding pre-meal insulin  -c/w ISS -BG 80's overnight.   -Lantus 34 --> 20 --> 18U --> 14U  -holding pre-meal insulin  -c/w ISS

## 2020-02-20 NOTE — PROGRESS NOTE ADULT - SUBJECTIVE AND OBJECTIVE BOX
Diabetes Follow up note:    Chief complaint: f/u T2DM     Interval Hx: Pt remains NPO w/AMS. Undergoing EEG. Remains on D5 infusion, had one glucose value in 80s overnight but no evidence developed hypoglycemia. Last BG 193mg/dl. Per team, plan to re-try inserting an NG tube today to start enteral nutrition. Pt opens eyes to voice but not following commands. Also started on IV abx for UTI today.     Review of Systems:  unable to provide ROS.   MEDS:  insulin glargine Injectable (LANTUS) 14 Unit(s) SubCutaneous every morning  insulin lispro (HumaLOG) corrective regimen sliding scale   SubCutaneous every 6 hours  levothyroxine Injectable 87.5 MICROGram(s) IV Push at bedtime  methylPREDNISolone sodium succinate Injectable 12.5 milliGRAM(s) IV Push daily  simvastatin 20 milliGRAM(s) Oral at bedtime    piperacillin/tazobactam IVPB.. 3.375 Gram(s) IV Intermittent every 8 hours    Allergies    Depakote (Other)    Intolerances    Seroquel (Other)    PE:  General: Female lying in bed. NAD.   Vital Signs Last 24 Hrs  T(C): 37.1 (20 Feb 2020 14:52), Max: 37.8 (19 Feb 2020 22:12)  T(F): 98.7 (20 Feb 2020 14:52), Max: 100 (19 Feb 2020 22:12)  HR: 84 (20 Feb 2020 14:52) (84 - 106)  BP: 131/85 (20 Feb 2020 14:52) (131/85 - 157/96)  BP(mean): --  RR: 20 (20 Feb 2020 14:52) (18 - 20)  SpO2: 99% (20 Feb 2020 14:52) (96% - 100%)  HEENT: EEG probes in place  Abd: Soft, NT,ND, Obese.   Extremities: Warm  Neuro: Lethargic. not following commands.     LABS:  POCT Blood Glucose.: 193 mg/dL (02-20-20 @ 12:55)  POCT Blood Glucose.: 195 mg/dL (02-20-20 @ 12:12)  POCT Blood Glucose.: 101 mg/dL (02-20-20 @ 06:24)  POCT Blood Glucose.: 88 mg/dL (02-20-20 @ 00:17)  POCT Blood Glucose.: 145 mg/dL (02-19-20 @ 18:03)  POCT Blood Glucose.: 180 mg/dL (02-19-20 @ 13:12)  POCT Blood Glucose.: 197 mg/dL (02-19-20 @ 08:49)  POCT Blood Glucose.: 128 mg/dL (02-19-20 @ 06:34)  POCT Blood Glucose.: 130 mg/dL (02-19-20 @ 00:01)  POCT Blood Glucose.: 150 mg/dL (02-18-20 @ 18:12)  POCT Blood Glucose.: 185 mg/dL (02-18-20 @ 12:43)  POCT Blood Glucose.: 173 mg/dL (02-18-20 @ 11:29)  POCT Blood Glucose.: 124 mg/dL (02-18-20 @ 08:41)  POCT Blood Glucose.: 99 mg/dL (02-18-20 @ 06:50)  POCT Blood Glucose.: 90 mg/dL (02-18-20 @ 00:16)  POCT Blood Glucose.: 125 mg/dL (02-17-20 @ 18:30)                            9.9    9.27  )-----------( 439      ( 20 Feb 2020 07:27 )             33.1       02-20    143  |  105  |  5<L>  ----------------------------<  107<H>  3.0<L>   |  22  |  0.80    Ca    9.0      20 Feb 2020 07:22  Phos  1.8     02-20  Mg     1.8     02-20    TPro  6.7  /  Alb  3.6  /  TBili  0.1<L>  /  DBili  x   /  AST  11  /  ALT  11  /  AlkPhos  65  02-20      Thyroid Function Tests:  02-13 @ 09:05 TSH 17.80 FreeT4 -- T3 -- Anti TPO -- Anti Thyroglobulin Ab -- TSI --  02-01 @ 22:37 TSH 8.35 FreeT4 1.3 T3 -- Anti TPO -- Anti Thyroglobulin Ab -- TSI --      Hemoglobin A1C, Whole Blood: 9.0 % <H> [4.0 - 5.6] (01-28-20 @ 19:30)  Hemoglobin A1C, Whole Blood: 9.2 % <H> [4.0 - 5.6] (01-27-20 @ 17:36)          Contact number: beeper [phone] or [phone]

## 2020-02-20 NOTE — PROGRESS NOTE ADULT - PROBLEM SELECTOR PLAN 2
SIRS criteria on 2/14 w/ Tmax 100.5 and tachycardia. UA w/ post nitrites, LE, many bacteria. UCx growing >100,000 GNR.   - BCx ngtd   - UCx E.Coli pansensitive  - zosyn (2/14-2/18) complete SIRS criteria on 2/14 w/ Tmax 100.5 and tachycardia. UA w/ post nitrites, LE, many bacteria. UCx growing >100,000 GNR.   - UCx E.Coli pansensitive s/p x 4 days zosyn (2/14-2/18)  -now with positive UA 2/20 zosyn restarted

## 2020-02-20 NOTE — PROGRESS NOTE ADULT - SUBJECTIVE AND OBJECTIVE BOX
***************************************************************  Logan Bryan, PGY1  Internal Medicine   pager: [phone]  ***************************************************************    AFTAB BASS  64y  MRN: [id number]    Patient is a 64y old  Female who presents with a chief complaint of change in mental status (19 Feb 2020 16:14)      Subjective: no events ON. Denies fever, CP, SOB, abn pain, N/V, dysuria. Tolerating diet.      MEDICATIONS  (STANDING):  acetaminophen  IVPB .. 1000 milliGRAM(s) IV Intermittent once  budesonide  80 MICROgram(s)/formoterol 4.5 MICROgram(s) Inhaler 2 Puff(s) Inhalation two times a day  dextrose 5% + sodium chloride 0.45%. 1000 milliLiter(s) (50 mL/Hr) IV Continuous <Continuous>  dextrose 5%. 1000 milliLiter(s) (50 mL/Hr) IV Continuous <Continuous>  dextrose 50% Injectable 12.5 Gram(s) IV Push once  dextrose 50% Injectable 25 Gram(s) IV Push once  dextrose 50% Injectable 25 Gram(s) IV Push once  influenza   Vaccine 0.5 milliLiter(s) IntraMuscular once  insulin glargine Injectable (LANTUS) 14 Unit(s) SubCutaneous every morning  insulin lispro (HumaLOG) corrective regimen sliding scale   SubCutaneous every 6 hours  levothyroxine Injectable 87.5 MICROGram(s) IV Push at bedtime  methylPREDNISolone sodium succinate Injectable 12.5 milliGRAM(s) IV Push daily  metoprolol tartrate Injectable 2.5 milliGRAM(s) IV Push every 6 hours  pantoprazole  Injectable 40 milliGRAM(s) IV Push daily  primidone 50 milliGRAM(s) Oral at bedtime  simvastatin 20 milliGRAM(s) Oral at bedtime    MEDICATIONS  (PRN):  acetaminophen  Suppository .. 650 milliGRAM(s) Rectal every 6 hours PRN Temp greater or equal to 38C (100.4F)  albuterol/ipratropium for Nebulization 3 milliLiter(s) Nebulizer every 6 hours PRN Shortness of Breath and/or Wheezing  dextrose 40% Gel 15 Gram(s) Oral once PRN Blood Glucose LESS THAN 70 milliGRAM(s)/deciLiter  glucagon  Injectable 1 milliGRAM(s) IntraMuscular once PRN Glucose <70 milliGRAM(s)/deciLiter  melatonin 3 milliGRAM(s) Oral at bedtime PRN Insomnia      Objective:    Vitals: Vital Signs Last 24 Hrs  T(C): 37 (02-20-20 @ 04:37), Max: 37.8 (02-19-20 @ 22:12)  T(F): 98.6 (02-20-20 @ 04:37), Max: 100 (02-19-20 @ 22:12)  HR: 90 (02-20-20 @ 05:51) (89 - 106)  BP: 157/96 (02-20-20 @ 04:37) (114/84 - 157/96)  BP(mean): --  RR: 18 (02-20-20 @ 04:37) (18 - 18)  SpO2: 96% (02-20-20 @ 05:51) (96% - 100%)            I&O's Summary    19 Feb 2020 07:01  -  20 Feb 2020 07:00  --------------------------------------------------------  IN: 600 mL / OUT: 300 mL / NET: 300 mL        PHYSICAL EXAM:  GENERAL: lethargic appearing  HEENT: PERRL, no scleral icterus, no head and neck lad   CHEST/LUNG: CTAB, no wheezing, rhonchi   HEART: RRR no murmurs   ABDOMEN: soft, nondistended, normoactive, nontender to palpation, bandage clear, dry and intact   SKIN: Old anterior abdominal wall wound appears chronic w/o s&s of infection (nonerythematous, no fluctuance)  NERVOUS SYSTEM: Alert & Oriented X 0, non-verbal     LABS:  02-19    144  |  105  |  6<L>  ----------------------------<  126<H>  3.6   |  22  |  0.77  02-18    148<H>  |  109<H>  |  8   ----------------------------<  117<H>  3.6   |  21<L>  |  0.89  02-17    143  |  104  |  8   ----------------------------<  188<H>  3.9   |  21<L>  |  0.87    Ca    9.1      19 Feb 2020 07:44  Ca    9.2      18 Feb 2020 07:00  Ca    9.2      17 Feb 2020 09:56  Phos  2.3     02-19  Mg     1.9     02-19    TPro  7.0  /  Alb  3.8  /  TBili  0.2  /  DBili  x   /  AST  12  /  ALT  13  /  AlkPhos  74  02-18                                              10.5   9.58  )-----------( 431      ( 19 Feb 2020 07:44 )             35.3                         10.4   9.82  )-----------( 405      ( 18 Feb 2020 07:00 )             34.5                         10.8   10.86 )-----------( 371      ( 17 Feb 2020 09:56 )             35.2     CAPILLARY BLOOD GLUCOSE      POCT Blood Glucose.: 101 mg/dL (20 Feb 2020 06:24)  POCT Blood Glucose.: 88 mg/dL (20 Feb 2020 00:17)  POCT Blood Glucose.: 145 mg/dL (19 Feb 2020 18:03)  POCT Blood Glucose.: 180 mg/dL (19 Feb 2020 13:12)  POCT Blood Glucose.: 197 mg/dL (19 Feb 2020 08:49)      RADIOLOGY & ADDITIONAL TESTS:    Imaging Personally Reviewed:  [x ] YES  [ ] NO    Consultants involved in case:   Consultant(s) Notes Reviewed:  [ x] YES  [ ] NO:   Care Discussed with Consultants/Other Providers [x ] YES  [ ] NO ***************************************************************  Logan Bryan, PGY1  Internal Medicine   pager: [phone]  ***************************************************************    AFTAB BASS  64y  MRN: [id number]    Patient is a 64y old  Female who presents with a chief complaint of change in mental status (19 Feb 2020 16:14)      Subjective: No events overnight. Patient very lethargic this morning. Minimally responsive to sternal rubs.       MEDICATIONS  (STANDING):  acetaminophen  IVPB .. 1000 milliGRAM(s) IV Intermittent once  budesonide  80 MICROgram(s)/formoterol 4.5 MICROgram(s) Inhaler 2 Puff(s) Inhalation two times a day  dextrose 5% + sodium chloride 0.45%. 1000 milliLiter(s) (50 mL/Hr) IV Continuous <Continuous>  dextrose 5%. 1000 milliLiter(s) (50 mL/Hr) IV Continuous <Continuous>  dextrose 50% Injectable 12.5 Gram(s) IV Push once  dextrose 50% Injectable 25 Gram(s) IV Push once  dextrose 50% Injectable 25 Gram(s) IV Push once  influenza   Vaccine 0.5 milliLiter(s) IntraMuscular once  insulin glargine Injectable (LANTUS) 14 Unit(s) SubCutaneous every morning  insulin lispro (HumaLOG) corrective regimen sliding scale   SubCutaneous every 6 hours  levothyroxine Injectable 87.5 MICROGram(s) IV Push at bedtime  methylPREDNISolone sodium succinate Injectable 12.5 milliGRAM(s) IV Push daily  metoprolol tartrate Injectable 2.5 milliGRAM(s) IV Push every 6 hours  pantoprazole  Injectable 40 milliGRAM(s) IV Push daily  primidone 50 milliGRAM(s) Oral at bedtime  simvastatin 20 milliGRAM(s) Oral at bedtime    MEDICATIONS  (PRN):  acetaminophen  Suppository .. 650 milliGRAM(s) Rectal every 6 hours PRN Temp greater or equal to 38C (100.4F)  albuterol/ipratropium for Nebulization 3 milliLiter(s) Nebulizer every 6 hours PRN Shortness of Breath and/or Wheezing  dextrose 40% Gel 15 Gram(s) Oral once PRN Blood Glucose LESS THAN 70 milliGRAM(s)/deciLiter  glucagon  Injectable 1 milliGRAM(s) IntraMuscular once PRN Glucose <70 milliGRAM(s)/deciLiter  melatonin 3 milliGRAM(s) Oral at bedtime PRN Insomnia      Objective:    Vitals: Vital Signs Last 24 Hrs  T(C): 37 (02-20-20 @ 04:37), Max: 37.8 (02-19-20 @ 22:12)  T(F): 98.6 (02-20-20 @ 04:37), Max: 100 (02-19-20 @ 22:12)  HR: 90 (02-20-20 @ 05:51) (89 - 106)  BP: 157/96 (02-20-20 @ 04:37) (114/84 - 157/96)  BP(mean): --  RR: 18 (02-20-20 @ 04:37) (18 - 18)  SpO2: 96% (02-20-20 @ 05:51) (96% - 100%)            I&O's Summary    19 Feb 2020 07:01  -  20 Feb 2020 07:00  --------------------------------------------------------  IN: 600 mL / OUT: 300 mL / NET: 300 mL        PHYSICAL EXAM:  GENERAL: lethargic appearing  HEENT: PERRL, no scleral icterus, no head and neck lad   CHEST/LUNG: CTAB, no wheezing, rhonchi   HEART: RRR no murmurs   ABDOMEN: soft, nondistended, normoactive, nontender to palpation, bandage clear, dry and intact   SKIN: Old anterior abdominal wall wound appears chronic w/o s&s of infection (nonerythematous, no fluctuance)  NERVOUS SYSTEM: Alert & Oriented X 0, non-verbal, minimal withdrawal to pain     LABS:  02-19    144  |  105  |  6<L>  ----------------------------<  126<H>  3.6   |  22  |  0.77  02-18    148<H>  |  109<H>  |  8   ----------------------------<  117<H>  3.6   |  21<L>  |  0.89  02-17    143  |  104  |  8   ----------------------------<  188<H>  3.9   |  21<L>  |  0.87    Ca    9.1      19 Feb 2020 07:44  Ca    9.2      18 Feb 2020 07:00  Ca    9.2      17 Feb 2020 09:56  Phos  2.3     02-19  Mg     1.9     02-19    TPro  7.0  /  Alb  3.8  /  TBili  0.2  /  DBili  x   /  AST  12  /  ALT  13  /  AlkPhos  74  02-18                                              10.5   9.58  )-----------( 431      ( 19 Feb 2020 07:44 )             35.3                         10.4   9.82  )-----------( 405      ( 18 Feb 2020 07:00 )             34.5                         10.8   10.86 )-----------( 371      ( 17 Feb 2020 09:56 )             35.2     CAPILLARY BLOOD GLUCOSE      POCT Blood Glucose.: 101 mg/dL (20 Feb 2020 06:24)  POCT Blood Glucose.: 88 mg/dL (20 Feb 2020 00:17)  POCT Blood Glucose.: 145 mg/dL (19 Feb 2020 18:03)  POCT Blood Glucose.: 180 mg/dL (19 Feb 2020 13:12)  POCT Blood Glucose.: 197 mg/dL (19 Feb 2020 08:49)      RADIOLOGY & ADDITIONAL TESTS:    Imaging Personally Reviewed:  [x ] YES  [ ] NO    Consultants involved in case:   Consultant(s) Notes Reviewed:  [ x] YES  [ ] NO:   Care Discussed with Consultants/Other Providers [x ] YES  [ ] NO

## 2020-02-20 NOTE — PROGRESS NOTE ADULT - PROBLEM SELECTOR PLAN 1
-test BG Q6h while NPO  -C/w Lantus 14 units QAM while NPO.   -if started on tube feeds, would discontinue D5 infusion  -c/w Humalog low correction scale Q6h. If started on tube feeds can change to moderate correction scale q6h  -if pt develops hypoglycemia, please notify endocrine team  -discussed plan w/RN and medicine team  pager: [phone]

## 2020-02-20 NOTE — PROGRESS NOTE ADULT - ASSESSMENT
65 y/o F w/h/o uncontrolled T2DM with unknown complications. Also h/o functional paraplegia, hypothyroidism, pulmonary hypertension, COPD, RA on chronic prednisone, SBO s/p ex lap with lysis of adhesions c/b multifocal pneumonia. Here with AMS/agitation. Pt still with AMS of unclear etiology and lack of capacity per mental health team. Restarted on abx for UTI. EEG ongoing. Plan per team is to establish NG tube to start enteral nutrition. BG goal (100-180mg/dl).

## 2020-02-21 LAB
ALBUMIN SERPL ELPH-MCNC: 3.5 G/DL — SIGNIFICANT CHANGE UP (ref 3.3–5)
ALP SERPL-CCNC: 67 U/L — SIGNIFICANT CHANGE UP (ref 40–120)
ALT FLD-CCNC: 15 U/L — SIGNIFICANT CHANGE UP (ref 10–45)
ANION GAP SERPL CALC-SCNC: 15 MMOL/L — SIGNIFICANT CHANGE UP (ref 5–17)
AST SERPL-CCNC: 15 U/L — SIGNIFICANT CHANGE UP (ref 10–40)
BILIRUB SERPL-MCNC: 0.2 MG/DL — SIGNIFICANT CHANGE UP (ref 0.2–1.2)
BUN SERPL-MCNC: <4 MG/DL — LOW (ref 7–23)
CALCIUM SERPL-MCNC: 8.9 MG/DL — SIGNIFICANT CHANGE UP (ref 8.4–10.5)
CHLORIDE SERPL-SCNC: 108 MMOL/L — SIGNIFICANT CHANGE UP (ref 96–108)
CO2 SERPL-SCNC: 21 MMOL/L — LOW (ref 22–31)
CREAT SERPL-MCNC: 0.78 MG/DL — SIGNIFICANT CHANGE UP (ref 0.5–1.3)
CULTURE RESULTS: SIGNIFICANT CHANGE UP
GLUCOSE BLDC GLUCOMTR-MCNC: 116 MG/DL — HIGH (ref 70–99)
GLUCOSE BLDC GLUCOMTR-MCNC: 138 MG/DL — HIGH (ref 70–99)
GLUCOSE BLDC GLUCOMTR-MCNC: 140 MG/DL — HIGH (ref 70–99)
GLUCOSE BLDC GLUCOMTR-MCNC: 155 MG/DL — HIGH (ref 70–99)
GLUCOSE BLDC GLUCOMTR-MCNC: 227 MG/DL — HIGH (ref 70–99)
GLUCOSE SERPL-MCNC: 128 MG/DL — HIGH (ref 70–99)
HCT VFR BLD CALC: 34.6 % — SIGNIFICANT CHANGE UP (ref 34.5–45)
HGB BLD-MCNC: 10.1 G/DL — LOW (ref 11.5–15.5)
MAGNESIUM SERPL-MCNC: 1.7 MG/DL — SIGNIFICANT CHANGE UP (ref 1.6–2.6)
MCHC RBC-ENTMCNC: 29.2 GM/DL — LOW (ref 32–36)
MCHC RBC-ENTMCNC: 30.1 PG — SIGNIFICANT CHANGE UP (ref 27–34)
MCV RBC AUTO: 103 FL — HIGH (ref 80–100)
NRBC # BLD: 0 /100 WBCS — SIGNIFICANT CHANGE UP (ref 0–0)
PHOSPHATE SERPL-MCNC: 2.7 MG/DL — SIGNIFICANT CHANGE UP (ref 2.5–4.5)
PLATELET # BLD AUTO: 407 K/UL — HIGH (ref 150–400)
POTASSIUM SERPL-MCNC: 4.1 MMOL/L — SIGNIFICANT CHANGE UP (ref 3.5–5.3)
POTASSIUM SERPL-SCNC: 4.1 MMOL/L — SIGNIFICANT CHANGE UP (ref 3.5–5.3)
PROT SERPL-MCNC: 6.8 G/DL — SIGNIFICANT CHANGE UP (ref 6–8.3)
RBC # BLD: 3.36 M/UL — LOW (ref 3.8–5.2)
RBC # FLD: 14.1 % — SIGNIFICANT CHANGE UP (ref 10.3–14.5)
SODIUM SERPL-SCNC: 144 MMOL/L — SIGNIFICANT CHANGE UP (ref 135–145)
SPECIMEN SOURCE: SIGNIFICANT CHANGE UP
WBC # BLD: 9.5 K/UL — SIGNIFICANT CHANGE UP (ref 3.8–10.5)
WBC # FLD AUTO: 9.5 K/UL — SIGNIFICANT CHANGE UP (ref 3.8–10.5)

## 2020-02-21 PROCEDURE — 99233 SBSQ HOSP IP/OBS HIGH 50: CPT | Mod: GC

## 2020-02-21 PROCEDURE — 99232 SBSQ HOSP IP/OBS MODERATE 35: CPT

## 2020-02-21 RX ORDER — PIPERACILLIN AND TAZOBACTAM 4; .5 G/20ML; G/20ML
3.38 INJECTION, POWDER, LYOPHILIZED, FOR SOLUTION INTRAVENOUS EVERY 8 HOURS
Refills: 0 | Status: DISCONTINUED | OUTPATIENT
Start: 2020-02-21 | End: 2020-02-23

## 2020-02-21 RX ORDER — INSULIN LISPRO 100/ML
VIAL (ML) SUBCUTANEOUS EVERY 6 HOURS
Refills: 0 | Status: DISCONTINUED | OUTPATIENT
Start: 2020-02-21 | End: 2020-02-27

## 2020-02-21 RX ADMIN — Medication 2: at 18:15

## 2020-02-21 RX ADMIN — Medication 100 MILLIGRAM(S): at 12:05

## 2020-02-21 RX ADMIN — Medication 12.5 MILLIGRAM(S): at 05:11

## 2020-02-21 RX ADMIN — Medication 2.5 MILLIGRAM(S): at 00:38

## 2020-02-21 RX ADMIN — PANTOPRAZOLE SODIUM 40 MILLIGRAM(S): 20 TABLET, DELAYED RELEASE ORAL at 12:05

## 2020-02-21 RX ADMIN — PIPERACILLIN AND TAZOBACTAM 25 GRAM(S): 4; .5 INJECTION, POWDER, LYOPHILIZED, FOR SOLUTION INTRAVENOUS at 13:40

## 2020-02-21 RX ADMIN — INSULIN GLARGINE 14 UNIT(S): 100 INJECTION, SOLUTION SUBCUTANEOUS at 05:55

## 2020-02-21 RX ADMIN — PRIMIDONE 50 MILLIGRAM(S): 250 TABLET ORAL at 22:01

## 2020-02-21 RX ADMIN — Medication 2.5 MILLIGRAM(S): at 12:05

## 2020-02-21 RX ADMIN — PIPERACILLIN AND TAZOBACTAM 25 GRAM(S): 4; .5 INJECTION, POWDER, LYOPHILIZED, FOR SOLUTION INTRAVENOUS at 05:11

## 2020-02-21 RX ADMIN — Medication 2.5 MILLIGRAM(S): at 18:03

## 2020-02-21 RX ADMIN — Medication 87.5 MICROGRAM(S): at 22:11

## 2020-02-21 RX ADMIN — Medication 15 MILLILITER(S): at 12:05

## 2020-02-21 RX ADMIN — Medication 4: at 12:04

## 2020-02-21 RX ADMIN — PIPERACILLIN AND TAZOBACTAM 25 GRAM(S): 4; .5 INJECTION, POWDER, LYOPHILIZED, FOR SOLUTION INTRAVENOUS at 22:02

## 2020-02-21 RX ADMIN — SIMVASTATIN 20 MILLIGRAM(S): 20 TABLET, FILM COATED ORAL at 22:01

## 2020-02-21 RX ADMIN — Medication 2.5 MILLIGRAM(S): at 05:56

## 2020-02-21 NOTE — PROGRESS NOTE ADULT - SUBJECTIVE AND OBJECTIVE BOX
DIABETES FOLLOW UP NOTE: Saw pt earlier today  INTERVAL HX: 65 y/o F w/h/o uncontrolled T2DM with unknown complications. Also h/o functional paraplegia, hypothyroidism, pulmonary hypertension, COPD, RA on chronic prednisone, SBO s/p ex lap with lysis of adhesions c/b multifocal pneumonia. Here with AMS/agitation. Pt still with AMS of unclear etiology and lack of capacity per mental health team. Also recent RRT with tachycardia, lethargy and no responding to verbal stimuli. Pt still lethargic. Started TFs of Glucerna 1.2 at 10cc this am as pr RN. Remains on antibiotic tx for UTI.> given in D5. Glycemic control mostly at goal while NPO but noted BG >200s at 12noon while on TFs. On chronic steroid now given IV. No hypoglycemia    Review of Systems:  Unable due to AMS    Allergies    Depakote (Other)    Intolerances    Seroquel (Other)    MEDICATIONS:  insulin glargine Injectable (LANTUS) 14 Unit(s) SubCutaneous every morning  insulin lispro (HumaLOG) corrective regimen sliding scale   SubCutaneous every 6 hours  levothyroxine Injectable 87.5 MICROGram(s) IV Push at bedtime  methylPREDNISolone sodium succinate Injectable 12.5 milliGRAM(s) IV Push daily  piperacillin/tazobactam IVPB.. 3.375 Gram(s) IV Intermittent every 8 hours  simvastatin 20 milliGRAM(s) Oral at bedtime    PHYSICAL EXAM:  VITALS: T(C): 36.9 (02-21-20 @ 12:12)  T(F): 98.5 (02-21-20 @ 12:12), Max: 98.5 (02-21-20 @ 12:12)  HR: 88 (02-21-20 @ 12:12) (88 - 99)  BP: 131/78 (02-21-20 @ 12:12) (130/84 - 165/94)  RR:  (18 - 20)  SpO2:  (95% - 100%)  Wt(kg): --  GENERAL: Female laying in bed in NAD  HEENT: NGT in place. TFs were off at time of visit because staff was doing AM care  Abdomen: Soft, nontender, non distended. Obese  Extremities: Warm, no edema in all 4 exts  NEURO: Lethargic.     LABS:  POCT Blood Glucose.: 227 mg/dL (02-21-20 @ 11:28)  POCT Blood Glucose.: 138 mg/dL (02-21-20 @ 05:49)  POCT Blood Glucose.: 116 mg/dL (02-21-20 @ 00:25)  POCT Blood Glucose.: 146 mg/dL (02-20-20 @ 18:17)  POCT Blood Glucose.: 193 mg/dL (02-20-20 @ 12:55)  POCT Blood Glucose.: 195 mg/dL (02-20-20 @ 12:12)  POCT Blood Glucose.: 101 mg/dL (02-20-20 @ 06:24)  POCT Blood Glucose.: 88 mg/dL (02-20-20 @ 00:17)  POCT Blood Glucose.: 145 mg/dL (02-19-20 @ 18:03)  POCT Blood Glucose.: 180 mg/dL (02-19-20 @ 13:12)  POCT Blood Glucose.: 197 mg/dL (02-19-20 @ 08:49)  POCT Blood Glucose.: 128 mg/dL (02-19-20 @ 06:34)  POCT Blood Glucose.: 130 mg/dL (02-19-20 @ 00:01)  POCT Blood Glucose.: 150 mg/dL (02-18-20 @ 18:12)                            10.1   9.50  )-----------( 407      ( 21 Feb 2020 07:29 )             34.6       02-21    144  |  108  |  <4<L>  ----------------------------<  128<H>  4.1   |  21<L>  |  0.78    EGFR if : 94      Ca    8.9      02-21  Mg     1.7     02-21  Phos  2.7     02-21    TPro  6.8  /  Alb  3.5  /  TBili  0.2  /  DBili  x   /  AST  15  /  ALT  15  /  AlkPhos  67  02-21      Thyroid Function Tests:  02-13 @ 09:05 TSH 17.80 FreeT4 -- T3 -- Anti TPO -- Anti Thyroglobulin Ab -- TSI --  02-01 @ 22:37 TSH 8.35 FreeT4 1.3 T3 -- Anti TPO -- Anti Thyroglobulin Ab -- TSI --      Hemoglobin A1C, Whole Blood: 9.0 % <H> [4.0 - 5.6] (01-28-20 @ 19:30)  Hemoglobin A1C, Whole Blood: 9.2 % <H> [4.0 - 5.6] (01-27-20 @ 17:36)      01-27 Chol 307<H> <H> HDL 50 Trig 352<H>

## 2020-02-21 NOTE — PROGRESS NOTE ADULT - ATTENDING COMMENTS
Acute encephalopathy, unspecified.     No clear single etiology at this point in time. Likely multifactorial. Patient with severe hypothyroidism and SDH on imaging. per neuro this could also be frontotemporal dementia given severe behavior disturbances over past 6 months. Head imaging without sinus venous thrombosis and  CSF autoimmune and paraneoplastic panel negative. Pt found to have UTI but unlikely cause of encephalopathy. Psych believes it is metabolic but recommended risperdone which pts son refused. received valproic acid over weekend but per pts son it makes her very lethargic.    now pt somnolent and minimally responsive x several days. EEG showing diffuse cerebral slowing/dysfunction- no epileptiform activity.   this may be hypoactive delirium in setting of underlying dementia vs new metabolic process.  neuro and psych signed off.    infectious workup and CT head repeated- UA positive. culture pending. started on zosyn.    pt too lethargic to participate with speech/swallow. it has been several days without nutrition- getting NG tube and tube feeds now. son agreeable. his goal is for pt to go to [facility] if she were to improved.

## 2020-02-21 NOTE — PROGRESS NOTE ADULT - ASSESSMENT
65 y/o F w/h/o uncontrolled T2DM with unknown complications. Also h/o functional paraplegia, hypothyroidism, pulmonary hypertension, COPD, RA on chronic prednisone, SBO s/p ex lap with lysis of adhesions c/b multifocal pneumonia. Here with AMS/agitation. Pt still with AMS of unclear etiology and lack of capacity per mental health team. Also recent RRT with tachycardia and lethargic. Started TFs of Glucerna 1.2 at 10cc this am as pr RN. Remains on antibiotic tx for UTI.> given in D5. Glycemic control mostly at goal while NPO but noted BG >200s at 12noon while on TFs. Will wait until TFs are at goal to evaluate if pt needs NPH insulin therapy. No hypoglycemia. BG goal (100-180mg/dl).   On chronic steroid/synthroid now given IV.

## 2020-02-21 NOTE — PROGRESS NOTE ADULT - SUBJECTIVE AND OBJECTIVE BOX
***************************************************************  Logan Bryan, PGY1  Internal Medicine   pager: [phone]  ***************************************************************    AFTAB BASS  64y  MRN: [id number]    Patient is a 64y old  Female who presents with a chief complaint of change in mental status (2020 15:33)      Subjective: NG tube placed yesterday evening, continuous tube feeds started. No events overnight. Non-verbal.     MEDICATIONS  (STANDING):  acetaminophen  IVPB .. 1000 milliGRAM(s) IV Intermittent once  budesonide  80 MICROgram(s)/formoterol 4.5 MICROgram(s) Inhaler 2 Puff(s) Inhalation two times a day  dextrose 5%. 1000 milliLiter(s) (50 mL/Hr) IV Continuous <Continuous>  dextrose 50% Injectable 12.5 Gram(s) IV Push once  dextrose 50% Injectable 25 Gram(s) IV Push once  dextrose 50% Injectable 25 Gram(s) IV Push once  influenza   Vaccine 0.5 milliLiter(s) IntraMuscular once  insulin glargine Injectable (LANTUS) 14 Unit(s) SubCutaneous every morning  insulin lispro (HumaLOG) corrective regimen sliding scale   SubCutaneous every 6 hours  levothyroxine Injectable 87.5 MICROGram(s) IV Push at bedtime  methylPREDNISolone sodium succinate Injectable 12.5 milliGRAM(s) IV Push daily  metoprolol tartrate Injectable 2.5 milliGRAM(s) IV Push every 6 hours  multivitamin/minerals/iron Oral Solution (CENTRUM) 15 milliLiter(s) Oral daily  pantoprazole  Injectable 40 milliGRAM(s) IV Push daily  piperacillin/tazobactam IVPB.. 3.375 Gram(s) IV Intermittent every 8 hours  primidone 50 milliGRAM(s) Oral at bedtime  simvastatin 20 milliGRAM(s) Oral at bedtime  tetracaine/benzocaine/butamben Spray 1 Spray(s) Topical once  thiamine 100 milliGRAM(s) Oral daily    MEDICATIONS  (PRN):  acetaminophen  Suppository .. 650 milliGRAM(s) Rectal every 6 hours PRN Temp greater or equal to 38C (100.4F)  albuterol/ipratropium for Nebulization 3 milliLiter(s) Nebulizer every 6 hours PRN Shortness of Breath and/or Wheezing  dextrose 40% Gel 15 Gram(s) Oral once PRN Blood Glucose LESS THAN 70 milliGRAM(s)/deciLiter  glucagon  Injectable 1 milliGRAM(s) IntraMuscular once PRN Glucose <70 milliGRAM(s)/deciLiter  melatonin 3 milliGRAM(s) Oral at bedtime PRN Insomnia      Objective:    Vitals: Vital Signs Last 24 Hrs  T(C): 36.6 (20 @ 05:55), Max: 37.6 (20 @ 08:51)  T(F): 97.9 (20 @ 05:55), Max: 99.6 (20 @ 08:51)  HR: 90 (20 @ 05:55) (84 - 99)  BP: 138/65 (20 @ 05:55) (130/84 - 165/94)  BP(mean): --  RR: 18 (20 @ 05:55) (18 - 20)  SpO2: 95% (20 @ 05:55) (95% - 100%)            I&O's Summary    2020 07:01  -  2020 07:00  --------------------------------------------------------  IN: 1100 mL / OUT: 480 mL / NET: 620 mL        PHYSICAL EXAM:  GENERAL: lethargic appearing  HEENT: PERRL, no scleral icterus, no head and neck lad   CHEST/LUNG: CTAB, no wheezing, rhonchi   HEART: RRR no murmurs   ABDOMEN: soft, nondistended, normoactive, nontender to palpation, bandage clear, dry and intact   SKIN: Old anterior abdominal wall wound appears chronic w/o s&s of infection (nonerythematous, no fluctuance)  NERVOUS SYSTEM: Alert & Oriented X 0, non-verbal, minimal withdrawal to pain       LABS:      143  |  105  |  5<L>  ----------------------------<  107<H>  3.0<L>   |  22  |  0.80      144  |  105  |  6<L>  ----------------------------<  126<H>  3.6   |  22  |  0.77    Ca    9.0      2020 07:22  Ca    9.1      2020 07:44  Phos  1.8       Mg     1.8         TPro  6.7  /  Alb  3.6  /  TBili  0.1<L>  /  DBili  x   /  AST  11  /  ALT  11  /  AlkPhos  65                      Urinalysis Basic - ( 2020 11:04 )    Color: Yellow / Appearance: Turbid / S.013 / pH: x  Gluc: x / Ketone: Moderate  / Bili: Negative / Urobili: Negative   Blood: x / Protein: 30 mg/dL / Nitrite: Negative   Leuk Esterase: Large / RBC: 6 /hpf / WBC >50 /HPF   Sq Epi: x / Non Sq Epi: 4 / Bacteria: Few                              9.9    9.27  )-----------( 439      ( 2020 07:27 )             33.1                         10.5   9.58  )-----------( 431      ( 2020 07:44 )             35.3     CAPILLARY BLOOD GLUCOSE      POCT Blood Glucose.: 138 mg/dL (2020 05:49)  POCT Blood Glucose.: 116 mg/dL (2020 00:25)  POCT Blood Glucose.: 146 mg/dL (2020 18:17)  POCT Blood Glucose.: 193 mg/dL (2020 12:55)  POCT Blood Glucose.: 195 mg/dL (2020 12:12)      RADIOLOGY & ADDITIONAL TESTS:    Imaging Personally Reviewed:  [x ] YES  [ ] NO    Consultants involved in case:   Consultant(s) Notes Reviewed:  [ x] YES  [ ] NO:   Care Discussed with Consultants/Other Providers [x ] YES  [ ] NO

## 2020-02-21 NOTE — PROGRESS NOTE ADULT - PROBLEM SELECTOR PLAN 2
-Noted TSH repeated but level is not reliable at this time since pt recently started Levothyroxine therapy and med was not given on an empty stomach most of the time due to AMS.  Expecting TSH to improve since now pt is getting med via IV  -C/w levothyroxine 87.5 MICROGram(s) IVP daily

## 2020-02-21 NOTE — PROGRESS NOTE ADULT - PROBLEM SELECTOR PLAN 7
- Bcx + for GPR on 1/12, 1 out of 2 bottles. +Bcx from 1/12 sent to [facility], no further speciation available.   - Blood cx obtained on 1/25 no growth X 5 days. Repeat 2/14 NGTD.   - vancomycin started 2/14 d/michael as repeat BCx NGTD  -f/u repeat blood cx from 2/20

## 2020-02-21 NOTE — PROGRESS NOTE ADULT - PROBLEM SELECTOR PLAN 2
SIRS criteria on 2/14 w/ Tmax 100.5 and tachycardia. UA w/ post nitrites, LE, many bacteria. UCx E.Coli pansensitive s/p x 4 days zosyn (2/14-2/18)  -now with positive UA 2/20 zosyn restarted (Day #2)  -CT abdomen 2/20 mildly striated right nephrogram concerning for pyelo

## 2020-02-21 NOTE — PROGRESS NOTE ADULT - PROBLEM SELECTOR PLAN 5
-Continuous tube feeds started via NG tube 2/20  -D5 discontinued  -ISS increased to moderate   -fingersticks q6hrs  -Lantus 34 --> 20 --> 18U --> 14U  -f/u endo recs regarding NPH while on tube feeds

## 2020-02-21 NOTE — PROGRESS NOTE ADULT - PROBLEM SELECTOR PLAN 10
- DVT ppx: SWITCHED TO SCDS GIVEN SDH  - Diet: continous tube feeds   - Dispo: [facility]   - DAMION holding primidone, simvastatin, gabapentin as of 2/12 as no IV equivalent available   - ADDENDUM: Spoke to pts family members about decision making.  The children agree that Norris will serve as the point of  and will be the final decision maker representing the family for when decisions have to be made.  They are in agreement with moving forward with sedation/potential intubation for pt to undergo further medical testing. Discussions had on 2/3/20. - Davis Kramer  Transitions of Care Status:  1.  Name of PCP:  Kristie Nails MD (PCP)  2.  PCP Contacted on Admission: [ ] Y    [x] N    3.  PCP contacted at Discharge: [ ] Y    [ ] N    [ ] N/A  4.  Post-Discharge Appointment Date and Location:  5.  Summary of Handoff given to PCP: - DVT ppx: SWITCHED TO SCDS GIVEN SDH  - Diet: continous tube feeds   - Dispo: [facility]   Transitions of Care Status:  1.  Name of PCP:  Kristie Nails MD (PCP)  2.  PCP Contacted on Admission: [ ] Y    [x] N    3.  PCP contacted at Discharge: [ ] Y    [ ] N    [ ] N/A  4.  Post-Discharge Appointment Date and Location:  5.  Summary of Handoff given to PCP:

## 2020-02-21 NOTE — PROGRESS NOTE ADULT - PROBLEM SELECTOR PLAN 1
-test BG Q6h while NPO  -C/w Lantus 14 units QAM while NPO.   -C/w Humalog moderate correction scale q6h until making sure pt tolerates TFs and are at goal rate  -Will start NPH q6h while on TFs if needed.  -Plan discussed with pt's RN and team.  Contact info: [phone] (24/7). pager [phone]

## 2020-02-21 NOTE — CHART NOTE - NSCHARTNOTEFT_GEN_A_CORE
Patient seen for malnutrition follow up.     Source: Comprehensive chart review, medical team     Chart reviewed, events noted. Endocrinology following for glycemic control and insulin adjustments. S/p Bedside Swallow Assessment recommending NPO, with non-oral nutrition/hydration/medications (). NGT placed ; tube feeds initiated today per report of team.     Diet : NPO with Tube Feed    Per chart, pt non-verbal. Pt seen, sleeping soundly. Observed Glucerna 1.2 running at 10 ml/hr. Per medical team, pt tolerating regimen without issue at this time. Pt continues to be at risk for refeeding syndrome, receiving multivitamin and thiamine since . Electrolytes WNL at this time, will continue to monitor. No reported GI distress at this time. Per chart, last BM .      Enteral/Parenteral Nutrition: Glucerna 1.2 at goal rate of 60 ml/hr x 24 hours. At goal, regimen provides 1440 ml formula, 1728 kcal, 86.4 gm protein, 1159 ml free water (meeting 18.3 kcal/kg, 0.9 gm/kg using dosing weight 94.3 kg).     Daily Weight in k.2 () - none new weight to address since previous RD assessment     Pertinent Medications: MEDICATIONS  (STANDING):  acetaminophen  IVPB .. 1000 milliGRAM(s) IV Intermittent once  budesonide  80 MICROgram(s)/formoterol 4.5 MICROgram(s) Inhaler 2 Puff(s) Inhalation two times a day  dextrose 5%. 1000 milliLiter(s) (50 mL/Hr) IV Continuous <Continuous>  dextrose 50% Injectable 12.5 Gram(s) IV Push once  dextrose 50% Injectable 25 Gram(s) IV Push once  dextrose 50% Injectable 25 Gram(s) IV Push once  influenza   Vaccine 0.5 milliLiter(s) IntraMuscular once  insulin glargine Injectable (LANTUS) 14 Unit(s) SubCutaneous every morning  insulin lispro (HumaLOG) corrective regimen sliding scale   SubCutaneous every 6 hours  levothyroxine Injectable 87.5 MICROGram(s) IV Push at bedtime  methylPREDNISolone sodium succinate Injectable 12.5 milliGRAM(s) IV Push daily  metoprolol tartrate Injectable 2.5 milliGRAM(s) IV Push every 6 hours  multivitamin/minerals/iron Oral Solution (CENTRUM) 15 milliLiter(s) Oral daily  pantoprazole  Injectable 40 milliGRAM(s) IV Push daily  piperacillin/tazobactam IVPB.. 3.375 Gram(s) IV Intermittent every 8 hours  primidone 50 milliGRAM(s) Oral at bedtime  simvastatin 20 milliGRAM(s) Oral at bedtime  tetracaine/benzocaine/butamben Spray 1 Spray(s) Topical once  thiamine 100 milliGRAM(s) Oral daily    MEDICATIONS  (PRN):  acetaminophen  Suppository .. 650 milliGRAM(s) Rectal every 6 hours PRN Temp greater or equal to 38C (100.4F)  albuterol/ipratropium for Nebulization 3 milliLiter(s) Nebulizer every 6 hours PRN Shortness of Breath and/or Wheezing  dextrose 40% Gel 15 Gram(s) Oral once PRN Blood Glucose LESS THAN 70 milliGRAM(s)/deciLiter  glucagon  Injectable 1 milliGRAM(s) IntraMuscular once PRN Glucose <70 milliGRAM(s)/deciLiter  melatonin 3 milliGRAM(s) Oral at bedtime PRN Insomnia    Pertinent Labs:  @ 07:29: Na 144, BUN <4<L>, Cr 0.78, <H>, K+ 4.1, Phos 2.7, Mg 1.7, Alk Phos 67, ALT/SGPT 15, AST/SGOT 15, HbA1c --    Finger Sticks:  POCT Blood Glucose.: 138 mg/dL ( @ 05:49)  POCT Blood Glucose.: 116 mg/dL ( @ 00:25)  POCT Blood Glucose.: 146 mg/dL ( @ 18:17)  POCT Blood Glucose.: 193 mg/dL ( @ 12:55)  POCT Blood Glucose.: 195 mg/dL ( @ 12:12)      Skin per nursing documentation: free of pressure injuries  Edema per nursing documentation: 1+ generalized    Estimated Needs:   · Weight Used for Calculation- dosing weight 94.3 kg ()    - Estimated Energy Needs (15-20 calories/kg): 1904-9744 kcal/day   - Estimated Protein Needs (0.8-1.0 gm/kg): 75.4-94.3 gm/day    Previous Nutrition Diagnosis #1: Altered Nutrition Related Lab Values  Nutrition Diagnosis is: ongoing, addressed with Glucerna tube feeding formula    Previous Nutrition Diagnosis #2: Moderate Malnutrition (acute)  Nutrition Diagnosis is: ongoing, addressed with provision of enteral nutrition    New Nutrition Diagnosis: none     Interventions: Nutrition care plan in progress     Recommend  1) Continue to advance Glucerna 1.2 by 10 ml/hr q4-6 hours or as tolerated by patient to goal rate of 60 ml/hr x 24 hours. At goal, regimen provides 1440 ml formula, 1728 kcal, 86.4 gm protein, 1159 ml free water (meeting 18.3 kcal/kg, 0.9 gm/kg using dosing weight 94.3 kg).   2) Continue micronutrient supplementation (multivitamin, thiamine)  3) Monitor electrolytes closely and replete as needed    Monitoring and Evaluation:   - Continue to monitor nutritional intake, tolerance to diet prescription, weights, labs, skin integrity  - RD remains available upon request and will follow up per protocol    Lana Engle RD CDN    Pager# [phone]

## 2020-02-21 NOTE — PROGRESS NOTE ADULT - PROBLEM SELECTOR PLAN 1
Unclear etiology. Per family this is rapidly progressing. Patient had some minor memory impairments roughly 30 days prior to admission when she started suffering from memory loss and paranoid delusions.   -Ddx includes psychosis/delirium 2/2 hypothyroidism. PE significant for myxedema. , free T4 0.3. Endocrine following. Now on IV synthroid. Ddx also includes FT dementia and CJD.   -MRI brain 3mm right sided SDH no neurosurgical intervention   -LP 2/6 CSF studies wnl.   -Paraneoplastic panel neg  -UTI s/p zosyn, now with CT abdomen concerning for pyelo. Zosyn restarted 2/20  -VEEG no seizure activity   - rheumatology state rheum origin less likely given neg for inflammatory changes; serum ALESSIA, ZAFAR, ribosomal P, dsdna neg, NMDRAb neg   - DVT ppx changed to SCDs given SDH  -psych states catatonia unlikely, can use trial of ativan  -repeat head CT no acute findings 2/20  -CXR clear lungs  -UA large LE

## 2020-02-22 LAB
ALBUMIN SERPL ELPH-MCNC: 3.4 G/DL — SIGNIFICANT CHANGE UP (ref 3.3–5)
ALP SERPL-CCNC: 76 U/L — SIGNIFICANT CHANGE UP (ref 40–120)
ALT FLD-CCNC: 12 U/L — SIGNIFICANT CHANGE UP (ref 10–45)
ANION GAP SERPL CALC-SCNC: 14 MMOL/L — SIGNIFICANT CHANGE UP (ref 5–17)
AST SERPL-CCNC: 11 U/L — SIGNIFICANT CHANGE UP (ref 10–40)
BILIRUB SERPL-MCNC: <0.1 MG/DL — LOW (ref 0.2–1.2)
BUN SERPL-MCNC: 7 MG/DL — SIGNIFICANT CHANGE UP (ref 7–23)
CALCIUM SERPL-MCNC: 9.1 MG/DL — SIGNIFICANT CHANGE UP (ref 8.4–10.5)
CHLORIDE SERPL-SCNC: 106 MMOL/L — SIGNIFICANT CHANGE UP (ref 96–108)
CO2 SERPL-SCNC: 20 MMOL/L — LOW (ref 22–31)
CREAT SERPL-MCNC: 0.88 MG/DL — SIGNIFICANT CHANGE UP (ref 0.5–1.3)
GLUCOSE BLDC GLUCOMTR-MCNC: 105 MG/DL — HIGH (ref 70–99)
GLUCOSE BLDC GLUCOMTR-MCNC: 209 MG/DL — HIGH (ref 70–99)
GLUCOSE BLDC GLUCOMTR-MCNC: 226 MG/DL — HIGH (ref 70–99)
GLUCOSE BLDC GLUCOMTR-MCNC: 306 MG/DL — HIGH (ref 70–99)
GLUCOSE SERPL-MCNC: 228 MG/DL — HIGH (ref 70–99)
MAGNESIUM SERPL-MCNC: 1.8 MG/DL — SIGNIFICANT CHANGE UP (ref 1.6–2.6)
PHOSPHATE SERPL-MCNC: 3.3 MG/DL — SIGNIFICANT CHANGE UP (ref 2.5–4.5)
POTASSIUM SERPL-MCNC: 3.5 MMOL/L — SIGNIFICANT CHANGE UP (ref 3.5–5.3)
POTASSIUM SERPL-SCNC: 3.5 MMOL/L — SIGNIFICANT CHANGE UP (ref 3.5–5.3)
PROT SERPL-MCNC: 6.3 G/DL — SIGNIFICANT CHANGE UP (ref 6–8.3)
SODIUM SERPL-SCNC: 140 MMOL/L — SIGNIFICANT CHANGE UP (ref 135–145)

## 2020-02-22 PROCEDURE — 71045 X-RAY EXAM CHEST 1 VIEW: CPT | Mod: 26

## 2020-02-22 PROCEDURE — 99232 SBSQ HOSP IP/OBS MODERATE 35: CPT

## 2020-02-22 PROCEDURE — 99233 SBSQ HOSP IP/OBS HIGH 50: CPT | Mod: GC

## 2020-02-22 RX ORDER — HUMAN INSULIN 100 [IU]/ML
6 INJECTION, SUSPENSION SUBCUTANEOUS EVERY 6 HOURS
Refills: 0 | Status: DISCONTINUED | OUTPATIENT
Start: 2020-02-22 | End: 2020-02-23

## 2020-02-22 RX ORDER — HEPARIN SODIUM 5000 [USP'U]/ML
5000 INJECTION INTRAVENOUS; SUBCUTANEOUS EVERY 8 HOURS
Refills: 0 | Status: DISCONTINUED | OUTPATIENT
Start: 2020-02-22 | End: 2020-03-07

## 2020-02-22 RX ORDER — FLUCONAZOLE 150 MG/1
200 TABLET ORAL DAILY
Refills: 0 | Status: DISCONTINUED | OUTPATIENT
Start: 2020-02-22 | End: 2020-02-27

## 2020-02-22 RX ADMIN — Medication 4: at 18:16

## 2020-02-22 RX ADMIN — PANTOPRAZOLE SODIUM 40 MILLIGRAM(S): 20 TABLET, DELAYED RELEASE ORAL at 13:23

## 2020-02-22 RX ADMIN — Medication 12.5 MILLIGRAM(S): at 05:04

## 2020-02-22 RX ADMIN — PIPERACILLIN AND TAZOBACTAM 25 GRAM(S): 4; .5 INJECTION, POWDER, LYOPHILIZED, FOR SOLUTION INTRAVENOUS at 15:03

## 2020-02-22 RX ADMIN — INSULIN GLARGINE 14 UNIT(S): 100 INJECTION, SOLUTION SUBCUTANEOUS at 07:21

## 2020-02-22 RX ADMIN — PIPERACILLIN AND TAZOBACTAM 25 GRAM(S): 4; .5 INJECTION, POWDER, LYOPHILIZED, FOR SOLUTION INTRAVENOUS at 21:40

## 2020-02-22 RX ADMIN — Medication 8: at 13:25

## 2020-02-22 RX ADMIN — Medication 2.5 MILLIGRAM(S): at 00:08

## 2020-02-22 RX ADMIN — Medication 15 MILLILITER(S): at 13:25

## 2020-02-22 RX ADMIN — Medication 2.5 MILLIGRAM(S): at 13:24

## 2020-02-22 RX ADMIN — Medication 2.5 MILLIGRAM(S): at 05:04

## 2020-02-22 RX ADMIN — HEPARIN SODIUM 5000 UNIT(S): 5000 INJECTION INTRAVENOUS; SUBCUTANEOUS at 21:39

## 2020-02-22 RX ADMIN — FLUCONAZOLE 200 MILLIGRAM(S): 150 TABLET ORAL at 13:24

## 2020-02-22 RX ADMIN — PIPERACILLIN AND TAZOBACTAM 25 GRAM(S): 4; .5 INJECTION, POWDER, LYOPHILIZED, FOR SOLUTION INTRAVENOUS at 05:05

## 2020-02-22 RX ADMIN — Medication 100 MILLIGRAM(S): at 13:24

## 2020-02-22 RX ADMIN — Medication 2.5 MILLIGRAM(S): at 18:16

## 2020-02-22 RX ADMIN — Medication 4: at 07:21

## 2020-02-22 RX ADMIN — Medication 87.5 MICROGRAM(S): at 21:40

## 2020-02-22 RX ADMIN — HEPARIN SODIUM 5000 UNIT(S): 5000 INJECTION INTRAVENOUS; SUBCUTANEOUS at 15:52

## 2020-02-22 RX ADMIN — HUMAN INSULIN 6 UNIT(S): 100 INJECTION, SUSPENSION SUBCUTANEOUS at 18:16

## 2020-02-22 NOTE — PROGRESS NOTE ADULT - ATTENDING COMMENTS
Acute encephalopathy, unspecified.     No clear single etiology at this point in time. Likely multifactorial. Patient with severe hypothyroidism and SDH on imaging. per neuro this could also be frontotemporal dementia given severe behavior disturbances over past 6 months. Head imaging without sinus venous thrombosis and  CSF autoimmune and paraneoplastic panel negative. Psych believes it is metabolic but recommended risperdone which pts son refused. received valproic acid but per pts son it makes her very lethargic.  EEG showing diffuse cerebral slowing/dysfunction- no epileptiform activity.   this may be hypoactive delirium in setting of underlying dementia vs new metabolic process.  neuro and psych signed off.    infectious workup and CT head repeated- UA positive. Culture growing candida -- started on diflucan. cont zosyn for now    pt too lethargic to participate with speech/swallow. it has been several days without nutrition- getting NG tube and tube feeds now. son agreeable. his goal is for pt to go to [facility] if she were to improved.

## 2020-02-22 NOTE — PROGRESS NOTE ADULT - PROBLEM SELECTOR PLAN 5
-Continuous tube feeds started via NG tube 2/20  -moderate ISS   -fingersticks q6hrs  - c/w Lantus 14U  -f/u endo recs regarding NPH while on tube feeds

## 2020-02-22 NOTE — PROGRESS NOTE ADULT - ASSESSMENT
63 y/o F w/h/o uncontrolled T2DM with unknown complications. Also h/o functional paraplegia, hypothyroidism, pulmonary hypertension, COPD, RA on chronic prednisone, SBO s/p ex lap with lysis of adhesions c/b multifocal pneumonia with AMS of unknown etiology. Started on continuous tube feeds now at goal w/worsening hyperglycemia. Will switch over to NPH insulin regimen to allow for more careful titration of insulin. BG goal (100-180mg/dl). On chronic steroid/synthroid now given IV.

## 2020-02-22 NOTE — PROGRESS NOTE ADULT - SUBJECTIVE AND OBJECTIVE BOX
Diabetes Follow up note:    Chief complaint: f/u T2DM w/hyperglycemia    Interval Hx: Pt now on Glucerna 50 cc/hr (goal rate). Glucose elevated 200-300 range today. Pt seen at bedside. Awake but non-verbal and not following commands.     Review of Systems:  unable to provide ROS.   MEDS:    insulin glargine Injectable (LANTUS) 14 Unit(s) SubCutaneous every morning  insulin lispro (HumaLOG) corrective regimen sliding scale   SubCutaneous every 6 hours  levothyroxine Injectable 87.5 MICROGram(s) IV Push at bedtime  methylPREDNISolone sodium succinate Injectable 12.5 milliGRAM(s) IV Push daily  simvastatin 20 milliGRAM(s) Oral at bedtime    fluconAZOLE   40 mG/mL Suspension 200 milliGRAM(s) Enteral Tube daily  piperacillin/tazobactam IVPB.. 3.375 Gram(s) IV Intermittent every 8 hours    Allergies    Depakote (Other)    Intolerances    Seroquel (Other)    PE:  General: Female lying in bed. NAD.   Vital Signs Last 24 Hrs  T(C): 37.5 (22 Feb 2020 13:34), Max: 37.9 (22 Feb 2020 04:44)  T(F): 99.5 (22 Feb 2020 13:34), Max: 100.2 (22 Feb 2020 04:44)  HR: 109 (22 Feb 2020 13:34) (95 - 109)  BP: 135/91 (22 Feb 2020 13:34) (109/73 - 135/91)  BP(mean): --  RR: 18 (22 Feb 2020 13:34) (18 - 20)  SpO2: 99% (22 Feb 2020 13:34) (93% - 99%)  HEENT: NGT in place.   Abd: Soft, NT,ND, Obese  Extremities: Warm  Neuro: Awake. not following commands.     LABS:  POCT Blood Glucose.: 306 mg/dL (02-22-20 @ 13:23)  POCT Blood Glucose.: 226 mg/dL (02-22-20 @ 06:38)  POCT Blood Glucose.: 140 mg/dL (02-21-20 @ 23:53)  POCT Blood Glucose.: 155 mg/dL (02-21-20 @ 18:15)  POCT Blood Glucose.: 227 mg/dL (02-21-20 @ 11:28)  POCT Blood Glucose.: 138 mg/dL (02-21-20 @ 05:49)  POCT Blood Glucose.: 116 mg/dL (02-21-20 @ 00:25)  POCT Blood Glucose.: 146 mg/dL (02-20-20 @ 18:17)  POCT Blood Glucose.: 193 mg/dL (02-20-20 @ 12:55)  POCT Blood Glucose.: 195 mg/dL (02-20-20 @ 12:12)  POCT Blood Glucose.: 101 mg/dL (02-20-20 @ 06:24)  POCT Blood Glucose.: 88 mg/dL (02-20-20 @ 00:17)  POCT Blood Glucose.: 145 mg/dL (02-19-20 @ 18:03)                            10.1   9.50  )-----------( 407      ( 21 Feb 2020 07:29 )             34.6       02-22    140  |  106  |  7   ----------------------------<  228<H>  3.5   |  20<L>  |  0.88    Ca    9.1      22 Feb 2020 07:06  Phos  3.3     02-22  Mg     1.8     02-22    TPro  6.3  /  Alb  3.4  /  TBili  <0.1<L>  /  DBili  x   /  AST  11  /  ALT  12  /  AlkPhos  76  02-22      Thyroid Function Tests:  02-13 @ 09:05 TSH 17.80 FreeT4 -- T3 -- Anti TPO -- Anti Thyroglobulin Ab -- TSI --  02-01 @ 22:37 TSH 8.35 FreeT4 1.3 T3 -- Anti TPO -- Anti Thyroglobulin Ab -- TSI --      Hemoglobin A1C, Whole Blood: 9.0 % <H> [4.0 - 5.6] (01-28-20 @ 19:30)  Hemoglobin A1C, Whole Blood: 9.2 % <H> [4.0 - 5.6] (01-27-20 @ 17:36)          Contact number: bhavna [phone] or [phone]

## 2020-02-22 NOTE — PROGRESS NOTE ADULT - PROBLEM SELECTOR PLAN 1
-test BG Q6h  -Discontinue Lantus. Last dose given this AM  -Start NPH 6 units Q6h. Will need to titrate up once Lantus effect worn off 2/23  -c/w Humalog moderate correction scale Q6h  -discussed plan w/pt and team  pager: [phone]

## 2020-02-22 NOTE — PROGRESS NOTE ADULT - PROBLEM SELECTOR PLAN 2
SIRS criteria on 2/14 w/ Tmax 100.5 and tachycardia. UA w/ post nitrites, LE, many bacteria. UCx E.Coli pansensitive s/p x 4 days zosyn (2/14-2/18)  -now with positive UA 2/20 zosyn restarted (Day 3/7). urine cx positive for candida  - will start patient on fluconazole x2 weeks.   - continue to monitor

## 2020-02-22 NOTE — PROGRESS NOTE ADULT - PROBLEM SELECTOR PLAN 8
- prednisone 15 mg switched to solumedrol 12.5mg qdaily   - steroids are not suspectd to be cause of ams  - Tramadol PRN

## 2020-02-22 NOTE — PROGRESS NOTE ADULT - PROBLEM SELECTOR PLAN 2
Noted TSH repeated but level is not reliable at this time since pt recently started Levothyroxine therapy and med was not given on an empty stomach most of the time due to AMS.  Expecting TSH to improve since now pt is getting med via IV  -C/w levothyroxine 87.5 MICROGram(s) IVP daily.

## 2020-02-22 NOTE — PROGRESS NOTE ADULT - PROBLEM SELECTOR PLAN 7
- Bcx + for GPR on 1/12, 1 out of 2 bottles. +Bcx from 1/12 sent to [facility] laboratories, no further speciation available.   - Blood cx obtained on 1/25 no growth X 5 days. Repeat 2/14 NGTD.   - vancomycin started 2/14 d/michael as repeat BCx NGTD  - repeat blood cx from 2/20 with NGTD

## 2020-02-22 NOTE — PROGRESS NOTE ADULT - PROBLEM SELECTOR PLAN 10
- DVT ppx: SWITCHED TO SCDS GIVEN SDH  - Diet: continous tube feeds   - Dispo: [facility]   Transitions of Care Status:  1.  Name of PCP:  Kristie Nails MD (PCP)  2.  PCP Contacted on Admission: [ ] Y    [x] N    3.  PCP contacted at Discharge: [ ] Y    [ ] N    [ ] N/A  4.  Post-Discharge Appointment Date and Location:  5.  Summary of Handoff given to PCP:

## 2020-02-22 NOTE — PROGRESS NOTE ADULT - SUBJECTIVE AND OBJECTIVE BOX
Toan Valerio, PGY-3  Internal Medicine Team  Pager:  [phone]/ [phone]    Patient is a 64y old  Female who presents with a chief complaint of change in mental status (2020 16:35)      SUBJECTIVE / OVERNIGHT EVENTS: FLORA overnight. This AM, patient answering yes and no questions only     MEDICATIONS  (STANDING):  acetaminophen  IVPB .. 1000 milliGRAM(s) IV Intermittent once  budesonide  80 MICROgram(s)/formoterol 4.5 MICROgram(s) Inhaler 2 Puff(s) Inhalation two times a day  dextrose 5%. 1000 milliLiter(s) (50 mL/Hr) IV Continuous <Continuous>  dextrose 50% Injectable 12.5 Gram(s) IV Push once  dextrose 50% Injectable 25 Gram(s) IV Push once  dextrose 50% Injectable 25 Gram(s) IV Push once  fluconAZOLE   40 mG/mL Suspension 200 milliGRAM(s) Enteral Tube daily  influenza   Vaccine 0.5 milliLiter(s) IntraMuscular once  insulin glargine Injectable (LANTUS) 14 Unit(s) SubCutaneous every morning  insulin lispro (HumaLOG) corrective regimen sliding scale   SubCutaneous every 6 hours  levothyroxine Injectable 87.5 MICROGram(s) IV Push at bedtime  methylPREDNISolone sodium succinate Injectable 12.5 milliGRAM(s) IV Push daily  metoprolol tartrate Injectable 2.5 milliGRAM(s) IV Push every 6 hours  multivitamin/minerals/iron Oral Solution (CENTRUM) 15 milliLiter(s) Oral daily  pantoprazole  Injectable 40 milliGRAM(s) IV Push daily  piperacillin/tazobactam IVPB.. 3.375 Gram(s) IV Intermittent every 8 hours  primidone 50 milliGRAM(s) Oral at bedtime  simvastatin 20 milliGRAM(s) Oral at bedtime  tetracaine/benzocaine/butamben Spray 1 Spray(s) Topical once  thiamine 100 milliGRAM(s) Oral daily    MEDICATIONS  (PRN):  acetaminophen  Suppository .. 650 milliGRAM(s) Rectal every 6 hours PRN Temp greater or equal to 38C (100.4F)  albuterol/ipratropium for Nebulization 3 milliLiter(s) Nebulizer every 6 hours PRN Shortness of Breath and/or Wheezing  dextrose 40% Gel 15 Gram(s) Oral once PRN Blood Glucose LESS THAN 70 milliGRAM(s)/deciLiter  glucagon  Injectable 1 milliGRAM(s) IntraMuscular once PRN Glucose <70 milliGRAM(s)/deciLiter  melatonin 3 milliGRAM(s) Oral at bedtime PRN Insomnia      OBJECTIVE:    Vital Signs Last 24 Hrs  T(C): 37.9 (2020 04:44), Max: 37.9 (2020 04:44)  T(F): 100.2 (2020 04:44), Max: 100.2 (2020 04:44)  HR: 95 (2020 04:44) (88 - 99)  BP: 125/74 (2020 04:44) (109/73 - 131/78)  BP(mean): --  RR: 18 (2020 04:44) (18 - 20)  SpO2: 98% (2020 04:44) (93% - 99%)    CAPILLARY BLOOD GLUCOSE      POCT Blood Glucose.: 226 mg/dL (2020 06:38)  POCT Blood Glucose.: 140 mg/dL (2020 23:53)  POCT Blood Glucose.: 155 mg/dL (2020 18:15)  POCT Blood Glucose.: 227 mg/dL (2020 11:28)    I&O's Summary    2020 07:01  -  2020 07:00  --------------------------------------------------------  IN: 690 mL / OUT: 200 mL / NET: 490 mL        PHYSICAL EXAM:  GENERAL: NAD, obese  HEAD:  Atraumatic, Normocephalic. NGT in place   EYES: EOMI, PERRLA, conjunctiva and sclera clear  NECK: Supple, No JVD  CHEST/LUNG: Clear to auscultation bilaterally anteriorly; No wheeze  HEART: Regular rate and rhythm; No murmurs, rubs, or gallops  ABDOMEN: Soft, Nontender, Nondistended; Bowel sounds present. midline surgical scar with dressing in place c/d/i  EXTREMITIES:  2+ Peripheral Pulses, No clubbing, cyanosis, or edema  PSYCH: AAOx0  NEUROLOGY: non-focal  SKIN: No rashes or lesions    LABS:                        10.1   9.50  )-----------( 407      ( 2020 07:29 )             34.6     Auto Eosinophil # x     / Auto Eosinophil % x     / Auto Neutrophil # x     / Auto Neutrophil % x     / BANDS % x            140  |  106  |  7   ----------------------------<  228<H>  3.5   |  20<L>  |  0.88      144  |  108  |  <4<L>  ----------------------------<  128<H>  4.1   |  21<L>  |  0.78    Ca    9.1      2020 07:06  Mg     1.8       Phos  3.3       TPro  6.3  /  Alb  3.4  /  TBili  <0.1<L>  /  DBili  x   /  AST  11  /  ALT  12  /  AlkPhos  76    TPro  6.8  /  Alb  3.5  /  TBili  0.2  /  DBili  x   /  AST  15  /  ALT  15  /  AlkPhos  67            Urinalysis Basic - ( 2020 11:04 )    Color: Yellow / Appearance: Turbid / S.013 / pH: x  Gluc: x / Ketone: Moderate  / Bili: Negative / Urobili: Negative   Blood: x / Protein: 30 mg/dL / Nitrite: Negative   Leuk Esterase: Large / RBC: 6 /hpf / WBC >50 /HPF   Sq Epi: x / Non Sq Epi: 4 / Bacteria: Few          RESPIRATORY  VENT:    ABG:     VBG:     RADIOLOGY & ADDITIONAL TESTS:  (Imaging Personally Reviewed)    Consultant(s) Notes Reviewed:      Care Discussed with Consultants/Other Providers:

## 2020-02-23 LAB
ANION GAP SERPL CALC-SCNC: 18 MMOL/L — HIGH (ref 5–17)
BASOPHILS # BLD AUTO: 0.06 K/UL — SIGNIFICANT CHANGE UP (ref 0–0.2)
BASOPHILS NFR BLD AUTO: 0.6 % — SIGNIFICANT CHANGE UP (ref 0–2)
BUN SERPL-MCNC: 11 MG/DL — SIGNIFICANT CHANGE UP (ref 7–23)
CALCIUM SERPL-MCNC: 9.4 MG/DL — SIGNIFICANT CHANGE UP (ref 8.4–10.5)
CERULOPLASMIN SERPL-MCNC: 45 MG/DL — SIGNIFICANT CHANGE UP (ref 16–45)
CHLORIDE SERPL-SCNC: 106 MMOL/L — SIGNIFICANT CHANGE UP (ref 96–108)
CO2 SERPL-SCNC: 21 MMOL/L — LOW (ref 22–31)
CREAT SERPL-MCNC: 0.88 MG/DL — SIGNIFICANT CHANGE UP (ref 0.5–1.3)
EOSINOPHIL # BLD AUTO: 0.03 K/UL — SIGNIFICANT CHANGE UP (ref 0–0.5)
EOSINOPHIL NFR BLD AUTO: 0.3 % — SIGNIFICANT CHANGE UP (ref 0–6)
GLUCOSE BLDC GLUCOMTR-MCNC: 104 MG/DL — HIGH (ref 70–99)
GLUCOSE BLDC GLUCOMTR-MCNC: 143 MG/DL — HIGH (ref 70–99)
GLUCOSE BLDC GLUCOMTR-MCNC: 157 MG/DL — HIGH (ref 70–99)
GLUCOSE BLDC GLUCOMTR-MCNC: 227 MG/DL — HIGH (ref 70–99)
GLUCOSE BLDC GLUCOMTR-MCNC: 281 MG/DL — HIGH (ref 70–99)
GLUCOSE SERPL-MCNC: 301 MG/DL — HIGH (ref 70–99)
HCT VFR BLD CALC: 31.7 % — LOW (ref 34.5–45)
HGB BLD-MCNC: 9.5 G/DL — LOW (ref 11.5–15.5)
IMM GRANULOCYTES NFR BLD AUTO: 1.1 % — SIGNIFICANT CHANGE UP (ref 0–1.5)
LYMPHOCYTES # BLD AUTO: 1.35 K/UL — SIGNIFICANT CHANGE UP (ref 1–3.3)
LYMPHOCYTES # BLD AUTO: 13.6 % — SIGNIFICANT CHANGE UP (ref 13–44)
MAGNESIUM SERPL-MCNC: 1.8 MG/DL — SIGNIFICANT CHANGE UP (ref 1.6–2.6)
MCHC RBC-ENTMCNC: 30 GM/DL — LOW (ref 32–36)
MCHC RBC-ENTMCNC: 30.1 PG — SIGNIFICANT CHANGE UP (ref 27–34)
MCV RBC AUTO: 100.3 FL — HIGH (ref 80–100)
MONOCYTES # BLD AUTO: 0.38 K/UL — SIGNIFICANT CHANGE UP (ref 0–0.9)
MONOCYTES NFR BLD AUTO: 3.8 % — SIGNIFICANT CHANGE UP (ref 2–14)
NEUTROPHILS # BLD AUTO: 7.99 K/UL — HIGH (ref 1.8–7.4)
NEUTROPHILS NFR BLD AUTO: 80.6 % — HIGH (ref 43–77)
NRBC # BLD: 0 /100 WBCS — SIGNIFICANT CHANGE UP (ref 0–0)
PHOSPHATE SERPL-MCNC: 2.8 MG/DL — SIGNIFICANT CHANGE UP (ref 2.5–4.5)
PLATELET # BLD AUTO: 390 K/UL — SIGNIFICANT CHANGE UP (ref 150–400)
POTASSIUM SERPL-MCNC: 3.9 MMOL/L — SIGNIFICANT CHANGE UP (ref 3.5–5.3)
POTASSIUM SERPL-SCNC: 3.9 MMOL/L — SIGNIFICANT CHANGE UP (ref 3.5–5.3)
RBC # BLD: 3.16 M/UL — LOW (ref 3.8–5.2)
RBC # FLD: 14.4 % — SIGNIFICANT CHANGE UP (ref 10.3–14.5)
SODIUM SERPL-SCNC: 145 MMOL/L — SIGNIFICANT CHANGE UP (ref 135–145)
T3 SERPL-MCNC: 53 NG/DL — LOW (ref 80–200)
T4 AB SER-ACNC: 5.5 UG/DL — SIGNIFICANT CHANGE UP (ref 4.6–12)
WBC # BLD: 9.92 K/UL — SIGNIFICANT CHANGE UP (ref 3.8–10.5)
WBC # FLD AUTO: 9.92 K/UL — SIGNIFICANT CHANGE UP (ref 3.8–10.5)

## 2020-02-23 PROCEDURE — 99233 SBSQ HOSP IP/OBS HIGH 50: CPT | Mod: GC

## 2020-02-23 PROCEDURE — 99232 SBSQ HOSP IP/OBS MODERATE 35: CPT

## 2020-02-23 RX ORDER — HUMAN INSULIN 100 [IU]/ML
6 INJECTION, SUSPENSION SUBCUTANEOUS
Refills: 0 | Status: DISCONTINUED | OUTPATIENT
Start: 2020-02-23 | End: 2020-02-25

## 2020-02-23 RX ORDER — HUMAN INSULIN 100 [IU]/ML
10 INJECTION, SUSPENSION SUBCUTANEOUS
Refills: 0 | Status: DISCONTINUED | OUTPATIENT
Start: 2020-02-24 | End: 2020-02-24

## 2020-02-23 RX ADMIN — HUMAN INSULIN 6 UNIT(S): 100 INJECTION, SUSPENSION SUBCUTANEOUS at 07:11

## 2020-02-23 RX ADMIN — Medication 2.5 MILLIGRAM(S): at 01:19

## 2020-02-23 RX ADMIN — PRIMIDONE 50 MILLIGRAM(S): 250 TABLET ORAL at 21:51

## 2020-02-23 RX ADMIN — Medication 2.5 MILLIGRAM(S): at 17:42

## 2020-02-23 RX ADMIN — HEPARIN SODIUM 5000 UNIT(S): 5000 INJECTION INTRAVENOUS; SUBCUTANEOUS at 06:26

## 2020-02-23 RX ADMIN — HEPARIN SODIUM 5000 UNIT(S): 5000 INJECTION INTRAVENOUS; SUBCUTANEOUS at 13:24

## 2020-02-23 RX ADMIN — HUMAN INSULIN 6 UNIT(S): 100 INJECTION, SUSPENSION SUBCUTANEOUS at 13:23

## 2020-02-23 RX ADMIN — Medication 0: at 01:14

## 2020-02-23 RX ADMIN — SIMVASTATIN 20 MILLIGRAM(S): 20 TABLET, FILM COATED ORAL at 21:51

## 2020-02-23 RX ADMIN — HUMAN INSULIN 6 UNIT(S): 100 INJECTION, SUSPENSION SUBCUTANEOUS at 17:42

## 2020-02-23 RX ADMIN — PANTOPRAZOLE SODIUM 40 MILLIGRAM(S): 20 TABLET, DELAYED RELEASE ORAL at 13:24

## 2020-02-23 RX ADMIN — HEPARIN SODIUM 5000 UNIT(S): 5000 INJECTION INTRAVENOUS; SUBCUTANEOUS at 21:51

## 2020-02-23 RX ADMIN — Medication 87.5 MICROGRAM(S): at 21:52

## 2020-02-23 RX ADMIN — FLUCONAZOLE 200 MILLIGRAM(S): 150 TABLET ORAL at 17:42

## 2020-02-23 RX ADMIN — Medication 12.5 MILLIGRAM(S): at 06:26

## 2020-02-23 RX ADMIN — Medication 15 MILLILITER(S): at 13:23

## 2020-02-23 RX ADMIN — Medication 6: at 13:23

## 2020-02-23 RX ADMIN — Medication 2.5 MILLIGRAM(S): at 13:23

## 2020-02-23 RX ADMIN — Medication 100 MILLIGRAM(S): at 13:24

## 2020-02-23 RX ADMIN — PIPERACILLIN AND TAZOBACTAM 25 GRAM(S): 4; .5 INJECTION, POWDER, LYOPHILIZED, FOR SOLUTION INTRAVENOUS at 06:26

## 2020-02-23 RX ADMIN — Medication 2.5 MILLIGRAM(S): at 06:27

## 2020-02-23 NOTE — PROGRESS NOTE ADULT - PROBLEM SELECTOR PLAN 10
- DVT ppx: SWITCHED TO SCDS GIVEN SDH  - Diet: continuous tube feeds   - Dispo: [facility]   Transitions of Care Status:  1.  Name of PCP:  Kristie Nails MD (PCP)  2.  PCP Contacted on Admission: [ ] Y    [x] N    3.  PCP contacted at Discharge: [ ] Y    [ ] N    [ ] N/A  4.  Post-Discharge Appointment Date and Location:  5.  Summary of Handoff given to PCP: - DVT ppx: HSQ q8hrs   - Diet: continuous tube feeds   - Dispo: [facility]   Transitions of Care Status:  1.  Name of PCP:  Kristie Nails MD (PCP)  2.  PCP Contacted on Admission: [ ] Y    [x] N    3.  PCP contacted at Discharge: [ ] Y    [ ] N    [ ] N/A  4.  Post-Discharge Appointment Date and Location:  5.  Summary of Handoff given to PCP:

## 2020-02-23 NOTE — PROVIDER CONTACT NOTE (CHANGE IN STATUS NOTIFICATION) - BACKGROUND
patient admitted for altered mental status.
Dx. Agitation, Altered Mental Status PMHx: Hypothyroidism, COPD, Multifocal Pneumonia, intubated (1/9), extubated (1/24) c/b Enterococci bacteremia, HTN
Dx: Altered mental status PMHx: Rheumatoid Arthritis, Diverticulosis, chronic asthma, borderline DM, HTN, Ex lap w/ lysis of adhesions, multifocal pneumonia
Pt admitted with AMS, H/O Pulmonary Hypertension, DM type II
pt. on glucerna 1.2 maria antonia at 60 cc/hr , 24 hr feeding

## 2020-02-23 NOTE — PROGRESS NOTE ADULT - PROBLEM SELECTOR PLAN 7
- Bcx + for GPR on 1/12, 1 out of 2 bottles. +Bcx from 1/12 sent to [facility] laboratories, no further speciation available.   - Blood cx obtained on 1/25 no growth X 5 days. Repeat 2/14 NGTD.   - vancomycin started 2/14 d/michael as repeat BCx NGTD  - repeat blood cx from 2/20 with NGTD

## 2020-02-23 NOTE — PROGRESS NOTE ADULT - PROBLEM SELECTOR PLAN 2
SIRS criteria on 2/14 w/ Tmax 100.5 and tachycardia. UA w/ post nitrites, LE, many bacteria. UCx E.Coli pansensitive s/p x 4 days zosyn (2/14-2/18)  -now with positive UA 2/20 zosyn restarted (Day 4/7). urine cx positive for candida  - will start patient on fluconazole x2 weeks.   - continue to monitor

## 2020-02-23 NOTE — PROGRESS NOTE ADULT - SUBJECTIVE AND OBJECTIVE BOX
***************************************************************  Logan Bryan, PGY1  Internal Medicine   pager: [phone]  ***************************************************************    AFTAB BASS  64y  MRN: [id number]    Patient is a 64y old  Female who presents with a chief complaint of change in mental status (22 Feb 2020 14:26)      Subjective: patient pulled NG tube overnight. New NG tube placed by night float team and tube feeds restarted.     MEDICATIONS  (STANDING):  acetaminophen  IVPB .. 1000 milliGRAM(s) IV Intermittent once  budesonide  80 MICROgram(s)/formoterol 4.5 MICROgram(s) Inhaler 2 Puff(s) Inhalation two times a day  dextrose 5%. 1000 milliLiter(s) (50 mL/Hr) IV Continuous <Continuous>  dextrose 50% Injectable 12.5 Gram(s) IV Push once  dextrose 50% Injectable 25 Gram(s) IV Push once  dextrose 50% Injectable 25 Gram(s) IV Push once  fluconAZOLE   40 mG/mL Suspension 200 milliGRAM(s) Enteral Tube daily  heparin  Injectable 5000 Unit(s) SubCutaneous every 8 hours  influenza   Vaccine 0.5 milliLiter(s) IntraMuscular once  insulin lispro (HumaLOG) corrective regimen sliding scale   SubCutaneous every 6 hours  insulin NPH human recombinant 6 Unit(s) SubCutaneous every 6 hours  levothyroxine Injectable 87.5 MICROGram(s) IV Push at bedtime  methylPREDNISolone sodium succinate Injectable 12.5 milliGRAM(s) IV Push daily  metoprolol tartrate Injectable 2.5 milliGRAM(s) IV Push every 6 hours  multivitamin/minerals/iron Oral Solution (CENTRUM) 15 milliLiter(s) Oral daily  pantoprazole  Injectable 40 milliGRAM(s) IV Push daily  piperacillin/tazobactam IVPB.. 3.375 Gram(s) IV Intermittent every 8 hours  primidone 50 milliGRAM(s) Oral at bedtime  simvastatin 20 milliGRAM(s) Oral at bedtime  tetracaine/benzocaine/butamben Spray 1 Spray(s) Topical once  thiamine 100 milliGRAM(s) Oral daily    MEDICATIONS  (PRN):  acetaminophen  Suppository .. 650 milliGRAM(s) Rectal every 6 hours PRN Temp greater or equal to 38C (100.4F)  albuterol/ipratropium for Nebulization 3 milliLiter(s) Nebulizer every 6 hours PRN Shortness of Breath and/or Wheezing  dextrose 40% Gel 15 Gram(s) Oral once PRN Blood Glucose LESS THAN 70 milliGRAM(s)/deciLiter  glucagon  Injectable 1 milliGRAM(s) IntraMuscular once PRN Glucose <70 milliGRAM(s)/deciLiter  melatonin 3 milliGRAM(s) Oral at bedtime PRN Insomnia      Objective:    Vitals: Vital Signs Last 24 Hrs  T(C): 37.1 (02-23-20 @ 05:01), Max: 37.5 (02-22-20 @ 13:34)  T(F): 98.7 (02-23-20 @ 05:01), Max: 99.5 (02-22-20 @ 13:34)  HR: 98 (02-23-20 @ 06:52) (77 - 109)  BP: 132/79 (02-23-20 @ 06:52) (115/75 - 154/93)  BP(mean): --  RR: 16 (02-23-20 @ 05:01) (16 - 18)  SpO2: 99% (02-23-20 @ 05:01) (97% - 99%)            I&O's Summary    22 Feb 2020 07:01  -  23 Feb 2020 07:00  --------------------------------------------------------  IN: 1350 mL / OUT: 0 mL / NET: 1350 mL      PHYSICAL EXAM:  GENERAL: NAD, obese  HEAD:  Atraumatic, Normocephalic. NGT in place   EYES: EOMI, PERRLA, conjunctiva and sclera clear  NECK: Supple, No JVD  CHEST/LUNG: Clear to auscultation bilaterally anteriorly; No wheeze  HEART: Regular rate and rhythm; No murmurs, rubs, or gallops  ABDOMEN: Soft, Nontender, Nondistended; Bowel sounds present. midline surgical scar with dressing in place c/d/i  EXTREMITIES:  2+ Peripheral Pulses, No clubbing, cyanosis, or edema  PSYCH: AAOx0  NEUROLOGY: non-focal  SKIN: No rashes or lesions      LABS:  02-22    140  |  106  |  7   ----------------------------<  228<H>  3.5   |  20<L>  |  0.88  02-21    144  |  108  |  <4<L>  ----------------------------<  128<H>  4.1   |  21<L>  |  0.78    Ca    9.1      22 Feb 2020 07:06  Ca    8.9      21 Feb 2020 07:29  Phos  3.3     02-22  Mg     1.8     02-22    TPro  6.3  /  Alb  3.4  /  TBili  <0.1<L>  /  DBili  x   /  AST  11  /  ALT  12  /  AlkPhos  76  02-22  TPro  6.8  /  Alb  3.5  /  TBili  0.2  /  DBili  x   /  AST  15  /  ALT  15  /  AlkPhos  67  02-21                                              10.1   9.50  )-----------( 407      ( 21 Feb 2020 07:29 )             34.6                         9.9    9.27  )-----------( 439      ( 20 Feb 2020 07:27 )             33.1     CAPILLARY BLOOD GLUCOSE      POCT Blood Glucose.: 143 mg/dL (23 Feb 2020 07:01)  POCT Blood Glucose.: 104 mg/dL (23 Feb 2020 01:12)  POCT Blood Glucose.: 105 mg/dL (22 Feb 2020 23:46)  POCT Blood Glucose.: 209 mg/dL (22 Feb 2020 17:50)  POCT Blood Glucose.: 306 mg/dL (22 Feb 2020 13:23)      RADIOLOGY & ADDITIONAL TESTS:    Imaging Personally Reviewed:  [x ] YES  [ ] NO    Consultants involved in case:   Consultant(s) Notes Reviewed:  [ x] YES  [ ] NO:   Care Discussed with Consultants/Other Providers [x ] YES  [ ] NO ***************************************************************  Logan Bryan, PGY1  Internal Medicine   pager: [phone]  ***************************************************************    AFTAB BASS  64y  MRN: [id number]    Patient is a 64y old  Female who presents with a chief complaint of change in mental status (22 Feb 2020 14:26)      Subjective: patient pulled NG tube overnight. New NG tube placed by night float team and tube feeds restarted. This morning continues to be lethargic. Patient opens eyes but does not respond to questions. Tolerating continuous tube feeds.     MEDICATIONS  (STANDING):  acetaminophen  IVPB .. 1000 milliGRAM(s) IV Intermittent once  budesonide  80 MICROgram(s)/formoterol 4.5 MICROgram(s) Inhaler 2 Puff(s) Inhalation two times a day  dextrose 5%. 1000 milliLiter(s) (50 mL/Hr) IV Continuous <Continuous>  dextrose 50% Injectable 12.5 Gram(s) IV Push once  dextrose 50% Injectable 25 Gram(s) IV Push once  dextrose 50% Injectable 25 Gram(s) IV Push once  fluconAZOLE   40 mG/mL Suspension 200 milliGRAM(s) Enteral Tube daily  heparin  Injectable 5000 Unit(s) SubCutaneous every 8 hours  influenza   Vaccine 0.5 milliLiter(s) IntraMuscular once  insulin lispro (HumaLOG) corrective regimen sliding scale   SubCutaneous every 6 hours  insulin NPH human recombinant 6 Unit(s) SubCutaneous every 6 hours  levothyroxine Injectable 87.5 MICROGram(s) IV Push at bedtime  methylPREDNISolone sodium succinate Injectable 12.5 milliGRAM(s) IV Push daily  metoprolol tartrate Injectable 2.5 milliGRAM(s) IV Push every 6 hours  multivitamin/minerals/iron Oral Solution (CENTRUM) 15 milliLiter(s) Oral daily  pantoprazole  Injectable 40 milliGRAM(s) IV Push daily  piperacillin/tazobactam IVPB.. 3.375 Gram(s) IV Intermittent every 8 hours  primidone 50 milliGRAM(s) Oral at bedtime  simvastatin 20 milliGRAM(s) Oral at bedtime  tetracaine/benzocaine/butamben Spray 1 Spray(s) Topical once  thiamine 100 milliGRAM(s) Oral daily    MEDICATIONS  (PRN):  acetaminophen  Suppository .. 650 milliGRAM(s) Rectal every 6 hours PRN Temp greater or equal to 38C (100.4F)  albuterol/ipratropium for Nebulization 3 milliLiter(s) Nebulizer every 6 hours PRN Shortness of Breath and/or Wheezing  dextrose 40% Gel 15 Gram(s) Oral once PRN Blood Glucose LESS THAN 70 milliGRAM(s)/deciLiter  glucagon  Injectable 1 milliGRAM(s) IntraMuscular once PRN Glucose <70 milliGRAM(s)/deciLiter  melatonin 3 milliGRAM(s) Oral at bedtime PRN Insomnia      Objective:    Vitals: Vital Signs Last 24 Hrs  T(C): 37.1 (02-23-20 @ 05:01), Max: 37.5 (02-22-20 @ 13:34)  T(F): 98.7 (02-23-20 @ 05:01), Max: 99.5 (02-22-20 @ 13:34)  HR: 98 (02-23-20 @ 06:52) (77 - 109)  BP: 132/79 (02-23-20 @ 06:52) (115/75 - 154/93)  BP(mean): --  RR: 16 (02-23-20 @ 05:01) (16 - 18)  SpO2: 99% (02-23-20 @ 05:01) (97% - 99%)            I&O's Summary    22 Feb 2020 07:01  -  23 Feb 2020 07:00  --------------------------------------------------------  IN: 1350 mL / OUT: 0 mL / NET: 1350 mL      PHYSICAL EXAM:  GENERAL: NAD, obese  HEAD:  Atraumatic, Normocephalic. NGT in place   EYES: EOMI, PERRLA, conjunctiva and sclera clear  NECK: Supple, No JVD  CHEST/LUNG: Clear to auscultation bilaterally anteriorly; No wheeze  HEART: Regular rate and rhythm; No murmurs, rubs, or gallops  ABDOMEN: Soft, Nontender, Nondistended; Bowel sounds present. midline surgical scar with dressing in place c/d/i  EXTREMITIES:  2+ Peripheral Pulses, No clubbing, cyanosis, or edema  PSYCH: AAOx0  NEUROLOGY: non-focal  SKIN: No rashes or lesions      LABS:  02-22    140  |  106  |  7   ----------------------------<  228<H>  3.5   |  20<L>  |  0.88  02-21    144  |  108  |  <4<L>  ----------------------------<  128<H>  4.1   |  21<L>  |  0.78    Ca    9.1      22 Feb 2020 07:06  Ca    8.9      21 Feb 2020 07:29  Phos  3.3     02-22  Mg     1.8     02-22    TPro  6.3  /  Alb  3.4  /  TBili  <0.1<L>  /  DBili  x   /  AST  11  /  ALT  12  /  AlkPhos  76  02-22  TPro  6.8  /  Alb  3.5  /  TBili  0.2  /  DBili  x   /  AST  15  /  ALT  15  /  AlkPhos  67  02-21                                              10.1   9.50  )-----------( 407      ( 21 Feb 2020 07:29 )             34.6                         9.9    9.27  )-----------( 439      ( 20 Feb 2020 07:27 )             33.1     CAPILLARY BLOOD GLUCOSE      POCT Blood Glucose.: 143 mg/dL (23 Feb 2020 07:01)  POCT Blood Glucose.: 104 mg/dL (23 Feb 2020 01:12)  POCT Blood Glucose.: 105 mg/dL (22 Feb 2020 23:46)  POCT Blood Glucose.: 209 mg/dL (22 Feb 2020 17:50)  POCT Blood Glucose.: 306 mg/dL (22 Feb 2020 13:23)      RADIOLOGY & ADDITIONAL TESTS:    Imaging Personally Reviewed:  [x ] YES  [ ] NO    Consultants involved in case:   Consultant(s) Notes Reviewed:  [ x] YES  [ ] NO:   Care Discussed with Consultants/Other Providers [x ] YES  [ ] NO

## 2020-02-23 NOTE — PROVIDER CONTACT NOTE (CHANGE IN STATUS NOTIFICATION) - ASSESSMENT
patient AOx2, son at bedside. patient in no s/s of distress. patient combative and aggressive.
PT is lethargic at baseline for past three days as per family at bedside and AM Assessment. Pt is arrousable to repeated stimulation.  Oral temp of 100.3; HR of 111; BP is 159/82; RR of 20 and O2 saturation of 98% on 3L nasal cannula
Pt. A/O x 0
Pt. c/o  increased congestion. Remains on 3L NC but refuses BiPap. Pt. c/o of abdominal pain and pain in legs upon movement/positioning. Pt. incontinent of urine x1. Patient slept most of night but agitated upon positioning in AM. Fingersticks refused, vital signs refuses, and medications refuses except prednisone.
Pt. refusing AM Lab work, Vital signs, and morning medications. Pt. also refused overnight BiPap and continuous pulse ox. Pt. shows no s/s of acute distress but c/o generalized pain and requesting tramadol.

## 2020-02-23 NOTE — PROVIDER CONTACT NOTE (CHANGE IN STATUS NOTIFICATION) - ACTION/TREATMENT ORDERED:
"will order Depakote"
MD aware. MD at bedside speaking to patient.
MD made aware. Tramadol order had already been given at midnight as one time dose r/t altered kidney function. Patient educated on risks of skipping medications. Frequent reorientation provided but patient remains disoriented to situation. No interventions ordered at this time. Will continue to monitor closely.
MD notified. No further interventions ordered at this time. Patient provided education on missed doses, vital signs, and fingersticks. Incontinence care provided x1. Abdominal dressings remain dry and intact. Will continue to monitor closely.
Check rectal temp; Attempt to straight cath to send U/A and culture; Rectal temp 100.5 - Administer stat dose of zosyn and vanco, obtain blood cultures
NGT replaced by MD , CXRay done . Feeding restarted at 0450 . Will continue to monitor

## 2020-02-23 NOTE — PROGRESS NOTE ADULT - PROBLEM SELECTOR PLAN 3
-see above   - levothyroxine 175 mg changed to 87.5 mg IV   -per endo no need to repeat TSH  -T4 and T3 sent today

## 2020-02-23 NOTE — PROGRESS NOTE ADULT - PROBLEM SELECTOR PLAN 1
Unclear etiology. Per family this is rapidly progressing. Patient had some minor memory impairments roughly 30 days prior to admission when she started suffering from memory loss and paranoid delusions.   -Ddx includes psychosis/delirium 2/2 hypothyroidism. PE significant for myxedema. , free T4 0.3. Endocrine following. Now on IV synthroid. Ddx also includes FT dementia and CJD.   -MRI brain 3mm right sided SDH no neurosurgical intervention   -LP 2/6 CSF studies wnl.   -Paraneoplastic panel neg  -UTI s/p zosyn, now with CT abdomen concerning for pyelo. Zosyn restarted 2/20  -VEEG no seizure activity   - rheumatology state rheum origin less likely given neg for inflammatory changes; serum ALESSIA, ZAFAR, ribosomal P, dsdna neg, NMDRAb neg   - DVT ppx changed to SCDs given SDH  -psych states catatonia unlikely, can use trial of ativan  -repeat head CT no acute findings 2/20  -CXR clear lungs  -UA large LE  -f/u T4, T3 to check synthroid is therapeutic  -Given hx of abdominal pain, AMS and anemia, lead level sent  -Ceruloplasmin sent given tremors and AMS Unclear etiology. Per family this is rapidly progressing. Patient had some minor memory impairments roughly 30 days prior to admission when she started suffering from memory loss and paranoid delusions.   -Ddx includes psychosis/delirium 2/2 hypothyroidism. PE significant for myxedema. , free T4 0.3. Endocrine following. Now on IV synthroid. Ddx also includes FT dementia and CJD.   -MRI brain 3mm right sided SDH no neurosurgical intervention   -LP 2/6 CSF studies wnl.   -Paraneoplastic panel neg  -UTI s/p zosyn, now with CT abdomen concerning for pyelo. Zosyn restarted 2/20  -VEEG no seizure activity   - rheumatology state rheum origin less likely given neg for inflammatory changes; serum ALESSIA, ZAFAR, ribosomal P, dsdna neg, NMDRAb neg   - DVT ppx changed to SCDs given SDH  -psych states catatonia unlikely, can use trial of ativan  -repeat head CT no acute findings 2/20  -CXR clear lungs  -UA large LE  -f/u T4, T3 to check synthroid is therapeutic  -hx of abdominal pain, AMS, possible bruton lines on physical exam therefore lead level sent. Will obtain full social history from patient's son regarding possible exposures  -Ceruloplasmin sent given tremors and AMS

## 2020-02-23 NOTE — PROGRESS NOTE ADULT - ATTENDING COMMENTS
Acute encephalopathy, unspecified.     No clear single etiology at this point in time. Likely multifactorial. Patient with severe hypothyroidism and SDH on imaging. per neuro this could also be frontotemporal dementia given severe behavior disturbances over past 6 months. Head imaging without sinus venous thrombosis and  CSF autoimmune and paraneoplastic panel negative. Psych believes it is metabolic but recommended risperdone which pts son refused. received valproic acid but per pts son it makes her very lethargic.  EEG showing diffuse cerebral slowing/dysfunction- no epileptiform activity.   this may be hypoactive delirium in setting of underlying dementia   neuro and psych signed off.    infectious workup and CT head repeated- UA positive. Culture growing candida -- started on diflucan. stop zosyn for now bc we don't know what we are treating    pt too lethargic to participate with speech/swallow. it has been several days without nutrition- getting NG tube and tube feeds now. son agreeable. his goal is for pt to go to [facility] if she were to improved.

## 2020-02-23 NOTE — PROGRESS NOTE ADULT - ASSESSMENT
63 y/o F w/h/o uncontrolled T2DM with unknown complications. Also h/o functional paraplegia, hypothyroidism, pulmonary hypertension, COPD, RA on chronic prednisone, SBO s/p ex lap with lysis of adhesions c/b multifocal pneumonia with AMS of unknown etiology. Now on continuous tube feeds w/variable glycemic control. Based on timing of steroids, would increase AM NPH dose. Will need to observe TFs for full 24 hours to full evaluate this regimen prior to making further changes. BG goal (100-180mg/dl)

## 2020-02-23 NOTE — PROGRESS NOTE ADULT - PROBLEM SELECTOR PLAN 5
-Continuous tube feeds started via NG tube 2/20  -moderate ISS   -fingersticks q6hrs  - d/c Lantus  -NPH 6U q6hrs while on tube feeds  -c/w moderate ISS

## 2020-02-23 NOTE — PROGRESS NOTE ADULT - PROBLEM SELECTOR PLAN 1
-test BG Q6h  Change NPH regimen to 10 units @ 6am, 6 units @ (12am, 12pm, 6pm). Please hold NPH if tube feeds off  -c/w Humalog moderate correction scale Q6h  -discharge plan: TBD based on nutrition plan  -f/u outpt w/Dr Saul  -discussed w/medicine team  pager: [phone]

## 2020-02-24 LAB
ANION GAP SERPL CALC-SCNC: 13 MMOL/L — SIGNIFICANT CHANGE UP (ref 5–17)
ANISOCYTOSIS BLD QL: SLIGHT — SIGNIFICANT CHANGE UP
BASOPHILS # BLD AUTO: 0 K/UL — SIGNIFICANT CHANGE UP (ref 0–0.2)
BASOPHILS NFR BLD AUTO: 0 % — SIGNIFICANT CHANGE UP (ref 0–2)
BUN SERPL-MCNC: 12 MG/DL — SIGNIFICANT CHANGE UP (ref 7–23)
CALCIUM SERPL-MCNC: 9.8 MG/DL — SIGNIFICANT CHANGE UP (ref 8.4–10.5)
CHLORIDE SERPL-SCNC: 104 MMOL/L — SIGNIFICANT CHANGE UP (ref 96–108)
CO2 SERPL-SCNC: 25 MMOL/L — SIGNIFICANT CHANGE UP (ref 22–31)
CREAT SERPL-MCNC: 0.79 MG/DL — SIGNIFICANT CHANGE UP (ref 0.5–1.3)
EOSINOPHIL # BLD AUTO: 0.38 K/UL — SIGNIFICANT CHANGE UP (ref 0–0.5)
EOSINOPHIL NFR BLD AUTO: 3 % — SIGNIFICANT CHANGE UP (ref 0–6)
FUNGITELL: <31 PG/ML — SIGNIFICANT CHANGE UP
GIANT PLATELETS BLD QL SMEAR: PRESENT — SIGNIFICANT CHANGE UP
GLUCOSE BLDC GLUCOMTR-MCNC: 176 MG/DL — HIGH (ref 70–99)
GLUCOSE BLDC GLUCOMTR-MCNC: 225 MG/DL — HIGH (ref 70–99)
GLUCOSE BLDC GLUCOMTR-MCNC: 288 MG/DL — HIGH (ref 70–99)
GLUCOSE BLDC GLUCOMTR-MCNC: 374 MG/DL — HIGH (ref 70–99)
GLUCOSE SERPL-MCNC: 302 MG/DL — HIGH (ref 70–99)
HCT VFR BLD CALC: 31 % — LOW (ref 34.5–45)
HGB BLD-MCNC: 9.5 G/DL — LOW (ref 11.5–15.5)
LEAD BLD-MCNC: 1 UG/DL — SIGNIFICANT CHANGE UP (ref 0–4)
LYMPHOCYTES # BLD AUTO: 23.8 % — SIGNIFICANT CHANGE UP (ref 13–44)
LYMPHOCYTES # BLD AUTO: 3.02 K/UL — SIGNIFICANT CHANGE UP (ref 1–3.3)
MAGNESIUM SERPL-MCNC: 1.8 MG/DL — SIGNIFICANT CHANGE UP (ref 1.6–2.6)
MANUAL SMEAR VERIFICATION: SIGNIFICANT CHANGE UP
MCHC RBC-ENTMCNC: 30.6 GM/DL — LOW (ref 32–36)
MCHC RBC-ENTMCNC: 30.8 PG — SIGNIFICANT CHANGE UP (ref 27–34)
MCV RBC AUTO: 100.6 FL — HIGH (ref 80–100)
MONOCYTES # BLD AUTO: 0.62 K/UL — SIGNIFICANT CHANGE UP (ref 0–0.9)
MONOCYTES NFR BLD AUTO: 4.9 % — SIGNIFICANT CHANGE UP (ref 2–14)
MYELOCYTES NFR BLD: 2 % — HIGH (ref 0–0)
NEUTROPHILS # BLD AUTO: 8.3 K/UL — HIGH (ref 1.8–7.4)
NEUTROPHILS NFR BLD AUTO: 65.3 % — SIGNIFICANT CHANGE UP (ref 43–77)
NRBC # BLD: 2 /100 — HIGH (ref 0–0)
PHOSPHATE SERPL-MCNC: 2.7 MG/DL — SIGNIFICANT CHANGE UP (ref 2.5–4.5)
PLAT MORPH BLD: ABNORMAL
PLATELET # BLD AUTO: 376 K/UL — SIGNIFICANT CHANGE UP (ref 150–400)
POIKILOCYTOSIS BLD QL AUTO: SLIGHT — SIGNIFICANT CHANGE UP
POTASSIUM SERPL-MCNC: 3.8 MMOL/L — SIGNIFICANT CHANGE UP (ref 3.5–5.3)
POTASSIUM SERPL-SCNC: 3.8 MMOL/L — SIGNIFICANT CHANGE UP (ref 3.5–5.3)
RBC # BLD: 3.08 M/UL — LOW (ref 3.8–5.2)
RBC # FLD: 14.5 % — SIGNIFICANT CHANGE UP (ref 10.3–14.5)
RBC BLD AUTO: SIGNIFICANT CHANGE UP
SMUDGE CELLS # BLD: PRESENT — SIGNIFICANT CHANGE UP
SODIUM SERPL-SCNC: 142 MMOL/L — SIGNIFICANT CHANGE UP (ref 135–145)
VALPROATE SERPL-MCNC: <3 UG/ML — LOW (ref 50–100)
VARIANT LYMPHS # BLD: 1 % — SIGNIFICANT CHANGE UP (ref 0–6)
WBC # BLD: 12.71 K/UL — HIGH (ref 3.8–10.5)
WBC # FLD AUTO: 12.71 K/UL — HIGH (ref 3.8–10.5)

## 2020-02-24 PROCEDURE — 99232 SBSQ HOSP IP/OBS MODERATE 35: CPT

## 2020-02-24 PROCEDURE — 99232 SBSQ HOSP IP/OBS MODERATE 35: CPT | Mod: GC

## 2020-02-24 RX ORDER — HUMAN INSULIN 100 [IU]/ML
14 INJECTION, SUSPENSION SUBCUTANEOUS
Refills: 0 | Status: DISCONTINUED | OUTPATIENT
Start: 2020-02-24 | End: 2020-02-25

## 2020-02-24 RX ADMIN — SIMVASTATIN 20 MILLIGRAM(S): 20 TABLET, FILM COATED ORAL at 22:04

## 2020-02-24 RX ADMIN — FLUCONAZOLE 200 MILLIGRAM(S): 150 TABLET ORAL at 17:45

## 2020-02-24 RX ADMIN — Medication 6: at 18:09

## 2020-02-24 RX ADMIN — PANTOPRAZOLE SODIUM 40 MILLIGRAM(S): 20 TABLET, DELAYED RELEASE ORAL at 12:02

## 2020-02-24 RX ADMIN — Medication 2.5 MILLIGRAM(S): at 06:16

## 2020-02-24 RX ADMIN — HUMAN INSULIN 6 UNIT(S): 100 INJECTION, SUSPENSION SUBCUTANEOUS at 00:09

## 2020-02-24 RX ADMIN — Medication 4: at 23:58

## 2020-02-24 RX ADMIN — HEPARIN SODIUM 5000 UNIT(S): 5000 INJECTION INTRAVENOUS; SUBCUTANEOUS at 14:10

## 2020-02-24 RX ADMIN — Medication 2: at 00:09

## 2020-02-24 RX ADMIN — Medication 2.5 MILLIGRAM(S): at 12:07

## 2020-02-24 RX ADMIN — HEPARIN SODIUM 5000 UNIT(S): 5000 INJECTION INTRAVENOUS; SUBCUTANEOUS at 22:04

## 2020-02-24 RX ADMIN — Medication 12.5 MILLIGRAM(S): at 06:15

## 2020-02-24 RX ADMIN — Medication 2.5 MILLIGRAM(S): at 00:10

## 2020-02-24 RX ADMIN — PRIMIDONE 50 MILLIGRAM(S): 250 TABLET ORAL at 22:04

## 2020-02-24 RX ADMIN — Medication 15 MILLILITER(S): at 12:02

## 2020-02-24 RX ADMIN — Medication 10: at 12:01

## 2020-02-24 RX ADMIN — HUMAN INSULIN 6 UNIT(S): 100 INJECTION, SUSPENSION SUBCUTANEOUS at 18:11

## 2020-02-24 RX ADMIN — Medication 100 MILLIGRAM(S): at 12:02

## 2020-02-24 RX ADMIN — Medication 2.5 MILLIGRAM(S): at 22:45

## 2020-02-24 RX ADMIN — HUMAN INSULIN 6 UNIT(S): 100 INJECTION, SUSPENSION SUBCUTANEOUS at 12:01

## 2020-02-24 RX ADMIN — HEPARIN SODIUM 5000 UNIT(S): 5000 INJECTION INTRAVENOUS; SUBCUTANEOUS at 06:15

## 2020-02-24 RX ADMIN — Medication 2.5 MILLIGRAM(S): at 17:45

## 2020-02-24 RX ADMIN — Medication 87.5 MICROGRAM(S): at 22:10

## 2020-02-24 NOTE — PROGRESS NOTE ADULT - ASSESSMENT
65 y/o F w/h/o uncontrolled T2DM with unknown complications. Also h/o functional paraplegia, hypothyroidism, pulmonary hypertension, COPD, RA on chronic prednisone, SBO s/p ex lap with lysis of adhesions c/b multifocal pneumonia with AMS of unknown etiology. Now on continuous tube feeds w glycemic control not at goal while on present insulin doses. Noted BG at 12 noon 200s yesterday and 300s today after getting steroid dose. Will increase 6am NPH dose further to BG goal 100s to 180s. No hypoglycemia.

## 2020-02-24 NOTE — PROGRESS NOTE ADULT - PROBLEM SELECTOR PLAN 7
- Bcx + for GPR on 1/12, 1 out of 2 bottles. +Bcx from 1/12 sent to [facility] laboratories, no further speciation available.   - Blood cx obtained on 1/25 no growth X 5 days. Repeat 2/14 NGTD.   - vancomycin started 2/14 d/michael as repeat BCx NGTD  - repeat blood cx from 2/20 with NGTD

## 2020-02-24 NOTE — PROGRESS NOTE ADULT - SUBJECTIVE AND OBJECTIVE BOX
DIABETES FOLLOW UP NOTE: Saw pt earlier today  INTERVAL HX: 65 y/o F w/h/o uncontrolled T2DM with unknown complications. Also h/o functional paraplegia, hypothyroidism, pulmonary hypertension, COPD, RA on chronic prednisone, SBO s/p ex lap with lysis of adhesions c/b multifocal pneumonia with AMS of unknown etiology. Now on continuous tube feeds w glycemic control not at goal while on present insulin doses. Noted BG at 12 noon 200s yesterday and 300s today after getting steroid dose. Per EMR commend at 6am> pt received full dose of NPH and Humalog but the system didn't "safe" it as given. No hypoglycemia. Pt more alert today able to nod yes when asked if feeling better and not when asked if having pain, Pt unable to speak. On fluconazole for candiduria.      Review of Systems:  General: As above.  Limited assessment due to AMS and pt is non verbal    Allergies    Depakote (Other)    Intolerances    Seroquel (Other)    MEDICATIONS   fluconAZOLE   40 mG/mL Suspension 200 milliGRAM(s) Enteral Tube daily  insulin lispro (HumaLOG) corrective regimen sliding scale   SubCutaneous every 6 hours  insulin NPH human recombinant 10 Unit(s) SubCutaneous <User Schedule>  insulin NPH human recombinant 6 Unit(s) SubCutaneous <User Schedule>  levothyroxine Injectable 87.5 MICROGram(s) IV Push at bedtime  methylPREDNISolone sodium succinate Injectable 12.5 milliGRAM(s) IV Push daily  simvastatin 20 milliGRAM(s) Oral at bedtime        PHYSICAL EXAM:  VITALS: T(C): 37.3 (02-24-20 @ 13:58)  T(F): 99.1 (02-24-20 @ 13:58), Max: 99.8 (02-24-20 @ 00:03)  HR: 107 (02-24-20 @ 13:58) (95 - 111)  BP: 124/62 (02-24-20 @ 13:58) (124/62 - 157/94)  RR:  (16 - 17)  SpO2:  (89% - 100%)  Wt(kg): --  GENERAL: Female laying on bed in NAD  HEENT: NGT in place with TFs of Glucerna 1.2 going at 60cc/hr  Abdomen: Soft, nontender, non distended, obese  Extremities: Warm, no edema in all 4 exts  NEURO: Alert able to answer simple yes/no questions but unable to talk or follow commands    LABS:  POCT Blood Glucose.: 374 mg/dL (02-24-20 @ 11:59)  POCT Blood Glucose.: 176 mg/dL (02-24-20 @ 05:44)  POCT Blood Glucose.: 157 mg/dL (02-23-20 @ 23:53)  POCT Blood Glucose.: 227 mg/dL (02-23-20 @ 17:40)  POCT Blood Glucose.: 281 mg/dL (02-23-20 @ 13:12)  POCT Blood Glucose.: 143 mg/dL (02-23-20 @ 07:01)  POCT Blood Glucose.: 104 mg/dL (02-23-20 @ 01:12)  POCT Blood Glucose.: 105 mg/dL (02-22-20 @ 23:46)  POCT Blood Glucose.: 209 mg/dL (02-22-20 @ 17:50)  POCT Blood Glucose.: 306 mg/dL (02-22-20 @ 13:23)  POCT Blood Glucose.: 226 mg/dL (02-22-20 @ 06:38)  POCT Blood Glucose.: 140 mg/dL (02-21-20 @ 23:53)  POCT Blood Glucose.: 155 mg/dL (02-21-20 @ 18:15)                            9.5    12.71 )-----------( 376      ( 24 Feb 2020 07:16 )             31.0       02-24    142  |  104  |  12  ----------------------------<  302<H>  3.8   |  25  |  0.79    EGFR if : 93    Ca    9.8      02-24  Mg     1.8     02-24  Phos  2.7     02-24    TPro  6.3  /  Alb  3.4  /  TBili  <0.1<L>  /  DBili  x   /  AST  11  /  ALT  12  /  AlkPhos  76  02-22      Thyroid Function Tests:  02-23 @ 13:25 TSH -- FreeT4 -- T3 53 Anti TPO -- Anti Thyroglobulin Ab -- TSI --  02-13 @ 09:05 TSH 17.80 FreeT4 -- T3 -- Anti TPO -- Anti Thyroglobulin Ab -- TSI --      Hemoglobin A1C, Whole Blood: 9.0 % <H> [4.0 - 5.6] (01-28-20 @ 19:30)  Hemoglobin A1C, Whole Blood: 9.2 % <H> [4.0 - 5.6] (01-27-20 @ 17:36)      01-27 Chol 307<H> <H> HDL 50 Trig 352<H>

## 2020-02-24 NOTE — PROGRESS NOTE ADULT - ATTENDING COMMENTS
Subacute Encephalopathy: unclear etiology, possibly frontotemporal dementia with behavioral disturbances and agitation per neuro  -today at bedside, opens eye to voice, but nonverbal and does not follow commands  -did have severe hypothyroidism on admission but now on synthroid with improving TFT, appreciate endo recs, needs repeat in 4-6 weeks  -also had SDH which is stable on imaging  -brain CT and MRI with no acute findings  -s/p LP, multiple negative CSF studies including, autoimmune, paraneoplastic panel, Purkinje fibers, etc all negative  -EEG showing diffuse cerebral slowing/dysfunction- no epileptiform activity.   -UA positive. Culture growing candida however unclear if colonization vs true infection, c/w diflucan but not causing mental status changes, will consider ID eval  -was given Depakote earlier in admission and reportedly more lethargic after that  -appreciate psych recs, patient does not have capacity  -appreciate neuro rece    c/w NG feeds, if lethargy not improved, may need PEG depending on GOC  poor prognosis

## 2020-02-24 NOTE — PROGRESS NOTE ADULT - PROBLEM SELECTOR PLAN 2
SIRS criteria on 2/14 w/ Tmax 100.5 and tachycardia. UA w/ post nitrites, LE, many bacteria. UCx E.Coli pansensitive s/p x 4 days zosyn (2/14-2/18)  -positive UA 2/20 zosyn x 3 days, however urine cx positive for candida only therefore zosyn d/c  - c/w fluconazole for x2 weeks

## 2020-02-24 NOTE — PROGRESS NOTE ADULT - PROBLEM SELECTOR PLAN 10
- DVT ppx: HSQ q8hrs   - Diet: continuous tube feeds   - Dispo: [facility]   Transitions of Care Status:  1.  Name of PCP:  Kristie Nails MD (PCP)  2.  PCP Contacted on Admission: [ ] Y    [x] N    3.  PCP contacted at Discharge: [ ] Y    [ ] N    [ ] N/A  4.  Post-Discharge Appointment Date and Location:  5.  Summary of Handoff given to PCP:

## 2020-02-24 NOTE — PROGRESS NOTE ADULT - SUBJECTIVE AND OBJECTIVE BOX
***************************************************************  Logan Bryan, PGY1  Internal Medicine   pager: [phone]  ***************************************************************    AFTAB BASS  64y  MRN: [id number]    Patient is a 64y old  Female who presents with a chief complaint of change in mental status (23 Feb 2020 14:28)      Subjective: no events ON. Denies fever, CP, SOB, abn pain, N/V, dysuria. Tolerating diet.      MEDICATIONS  (STANDING):  acetaminophen  IVPB .. 1000 milliGRAM(s) IV Intermittent once  budesonide  80 MICROgram(s)/formoterol 4.5 MICROgram(s) Inhaler 2 Puff(s) Inhalation two times a day  dextrose 5%. 1000 milliLiter(s) (50 mL/Hr) IV Continuous <Continuous>  dextrose 50% Injectable 12.5 Gram(s) IV Push once  dextrose 50% Injectable 25 Gram(s) IV Push once  dextrose 50% Injectable 25 Gram(s) IV Push once  fluconAZOLE   40 mG/mL Suspension 200 milliGRAM(s) Enteral Tube daily  heparin  Injectable 5000 Unit(s) SubCutaneous every 8 hours  influenza   Vaccine 0.5 milliLiter(s) IntraMuscular once  insulin lispro (HumaLOG) corrective regimen sliding scale   SubCutaneous every 6 hours  insulin NPH human recombinant 10 Unit(s) SubCutaneous <User Schedule>  insulin NPH human recombinant 6 Unit(s) SubCutaneous <User Schedule>  levothyroxine Injectable 87.5 MICROGram(s) IV Push at bedtime  methylPREDNISolone sodium succinate Injectable 12.5 milliGRAM(s) IV Push daily  metoprolol tartrate Injectable 2.5 milliGRAM(s) IV Push every 6 hours  multivitamin/minerals/iron Oral Solution (CENTRUM) 15 milliLiter(s) Oral daily  pantoprazole  Injectable 40 milliGRAM(s) IV Push daily  primidone 50 milliGRAM(s) Oral at bedtime  simvastatin 20 milliGRAM(s) Oral at bedtime  thiamine 100 milliGRAM(s) Oral daily    MEDICATIONS  (PRN):  acetaminophen  Suppository .. 650 milliGRAM(s) Rectal every 6 hours PRN Temp greater or equal to 38C (100.4F)  albuterol/ipratropium for Nebulization 3 milliLiter(s) Nebulizer every 6 hours PRN Shortness of Breath and/or Wheezing  dextrose 40% Gel 15 Gram(s) Oral once PRN Blood Glucose LESS THAN 70 milliGRAM(s)/deciLiter  glucagon  Injectable 1 milliGRAM(s) IntraMuscular once PRN Glucose <70 milliGRAM(s)/deciLiter  melatonin 3 milliGRAM(s) Oral at bedtime PRN Insomnia      Objective:    Vitals: Vital Signs Last 24 Hrs  T(C): 37.2 (02-24-20 @ 06:50), Max: 37.7 (02-24-20 @ 00:03)  T(F): 98.9 (02-24-20 @ 06:50), Max: 99.8 (02-24-20 @ 00:03)  HR: 95 (02-24-20 @ 06:50) (95 - 111)  BP: 143/86 (02-24-20 @ 06:50) (139/73 - 157/94)  BP(mean): --  RR: 16 (02-24-20 @ 06:50) (16 - 17)  SpO2: 100% (02-24-20 @ 06:50) (89% - 100%)            I&O's Summary    23 Feb 2020 07:01  -  24 Feb 2020 07:00  --------------------------------------------------------  IN: 1800 mL / OUT: 0 mL / NET: 1800 mL      PHYSICAL EXAM:  GENERAL: NAD, obese  HEAD:  Atraumatic, Normocephalic. NGT in place   EYES: EOMI, PERRLA, conjunctiva and sclera clear  NECK: Supple, No JVD  CHEST/LUNG: Clear to auscultation bilaterally anteriorly; No wheeze  HEART: Regular rate and rhythm; No murmurs, rubs, or gallops  ABDOMEN: Soft, Nontender, Nondistended; Bowel sounds present. midline surgical scar with dressing in place c/d/i  EXTREMITIES:  2+ Peripheral Pulses, No clubbing, cyanosis, or edema  PSYCH: AAOx0  NEUROLOGY: non-focal  SKIN: No rashes or lesions      LABS:  02-23    145  |  106  |  11  ----------------------------<  301<H>  3.9   |  21<L>  |  0.88  02-22    140  |  106  |  7   ----------------------------<  228<H>  3.5   |  20<L>  |  0.88    Ca    9.4      23 Feb 2020 12:31  Ca    9.1      22 Feb 2020 07:06  Phos  2.8     02-23  Mg     1.8     02-23    TPro  6.3  /  Alb  3.4  /  TBili  <0.1<L>  /  DBili  x   /  AST  11  /  ALT  12  /  AlkPhos  76  02-22                                              9.5    12.71 )-----------( 376      ( 24 Feb 2020 07:16 )             31.0                         9.5    9.92  )-----------( 390      ( 23 Feb 2020 12:29 )             31.7     CAPILLARY BLOOD GLUCOSE      POCT Blood Glucose.: 176 mg/dL (24 Feb 2020 05:44)  POCT Blood Glucose.: 157 mg/dL (23 Feb 2020 23:53)  POCT Blood Glucose.: 227 mg/dL (23 Feb 2020 17:40)  POCT Blood Glucose.: 281 mg/dL (23 Feb 2020 13:12)      RADIOLOGY & ADDITIONAL TESTS:    Imaging Personally Reviewed:  [x ] YES  [ ] NO    Consultants involved in case:   Consultant(s) Notes Reviewed:  [ x] YES  [ ] NO:   Care Discussed with Consultants/Other Providers [x ] YES  [ ] NO ***************************************************************  Logan Bryan, PGY1  Internal Medicine   pager: [phone]  ***************************************************************    AFTAB BASS  64y  MRN: [id number]    Patient is a 64y old  Female who presents with a chief complaint of change in mental status (23 Feb 2020 14:28)      Subjective: no events ON. Patient continues to be lethargic, does not respond to questions or follow commands. Eyes are open and tracking throughout exam. Continuous feeds.     MEDICATIONS  (STANDING):  acetaminophen  IVPB .. 1000 milliGRAM(s) IV Intermittent once  budesonide  80 MICROgram(s)/formoterol 4.5 MICROgram(s) Inhaler 2 Puff(s) Inhalation two times a day  dextrose 5%. 1000 milliLiter(s) (50 mL/Hr) IV Continuous <Continuous>  dextrose 50% Injectable 12.5 Gram(s) IV Push once  dextrose 50% Injectable 25 Gram(s) IV Push once  dextrose 50% Injectable 25 Gram(s) IV Push once  fluconAZOLE   40 mG/mL Suspension 200 milliGRAM(s) Enteral Tube daily  heparin  Injectable 5000 Unit(s) SubCutaneous every 8 hours  influenza   Vaccine 0.5 milliLiter(s) IntraMuscular once  insulin lispro (HumaLOG) corrective regimen sliding scale   SubCutaneous every 6 hours  insulin NPH human recombinant 10 Unit(s) SubCutaneous <User Schedule>  insulin NPH human recombinant 6 Unit(s) SubCutaneous <User Schedule>  levothyroxine Injectable 87.5 MICROGram(s) IV Push at bedtime  methylPREDNISolone sodium succinate Injectable 12.5 milliGRAM(s) IV Push daily  metoprolol tartrate Injectable 2.5 milliGRAM(s) IV Push every 6 hours  multivitamin/minerals/iron Oral Solution (CENTRUM) 15 milliLiter(s) Oral daily  pantoprazole  Injectable 40 milliGRAM(s) IV Push daily  primidone 50 milliGRAM(s) Oral at bedtime  simvastatin 20 milliGRAM(s) Oral at bedtime  thiamine 100 milliGRAM(s) Oral daily    MEDICATIONS  (PRN):  acetaminophen  Suppository .. 650 milliGRAM(s) Rectal every 6 hours PRN Temp greater or equal to 38C (100.4F)  albuterol/ipratropium for Nebulization 3 milliLiter(s) Nebulizer every 6 hours PRN Shortness of Breath and/or Wheezing  dextrose 40% Gel 15 Gram(s) Oral once PRN Blood Glucose LESS THAN 70 milliGRAM(s)/deciLiter  glucagon  Injectable 1 milliGRAM(s) IntraMuscular once PRN Glucose <70 milliGRAM(s)/deciLiter  melatonin 3 milliGRAM(s) Oral at bedtime PRN Insomnia      Objective:    Vitals: Vital Signs Last 24 Hrs  T(C): 37.2 (02-24-20 @ 06:50), Max: 37.7 (02-24-20 @ 00:03)  T(F): 98.9 (02-24-20 @ 06:50), Max: 99.8 (02-24-20 @ 00:03)  HR: 95 (02-24-20 @ 06:50) (95 - 111)  BP: 143/86 (02-24-20 @ 06:50) (139/73 - 157/94)  BP(mean): --  RR: 16 (02-24-20 @ 06:50) (16 - 17)  SpO2: 100% (02-24-20 @ 06:50) (89% - 100%)            I&O's Summary    23 Feb 2020 07:01  -  24 Feb 2020 07:00  --------------------------------------------------------  IN: 1800 mL / OUT: 0 mL / NET: 1800 mL      PHYSICAL EXAM:  GENERAL: NAD, obese  HEAD:  Atraumatic, Normocephalic. NGT in place   EYES: EOMI, PERRLA, conjunctiva and sclera clear  NECK: Supple, No JVD  CHEST/LUNG: Clear to auscultation bilaterally anteriorly; No wheeze  HEART: Regular rate and rhythm; No murmurs, rubs, or gallops  ABDOMEN: Soft, Nontender, Nondistended; Bowel sounds present. midline surgical scar with dressing in place c/d/i  EXTREMITIES:  2+ Peripheral Pulses, No clubbing, cyanosis, or edema  PSYCH: AAOx0  NEUROLOGY: non-focal  SKIN: No rashes or lesions      LABS:  02-23    145  |  106  |  11  ----------------------------<  301<H>  3.9   |  21<L>  |  0.88  02-22    140  |  106  |  7   ----------------------------<  228<H>  3.5   |  20<L>  |  0.88    Ca    9.4      23 Feb 2020 12:31  Ca    9.1      22 Feb 2020 07:06  Phos  2.8     02-23  Mg     1.8     02-23    TPro  6.3  /  Alb  3.4  /  TBili  <0.1<L>  /  DBili  x   /  AST  11  /  ALT  12  /  AlkPhos  76  02-22                                              9.5    12.71 )-----------( 376      ( 24 Feb 2020 07:16 )             31.0                         9.5    9.92  )-----------( 390      ( 23 Feb 2020 12:29 )             31.7     CAPILLARY BLOOD GLUCOSE      POCT Blood Glucose.: 176 mg/dL (24 Feb 2020 05:44)  POCT Blood Glucose.: 157 mg/dL (23 Feb 2020 23:53)  POCT Blood Glucose.: 227 mg/dL (23 Feb 2020 17:40)  POCT Blood Glucose.: 281 mg/dL (23 Feb 2020 13:12)      RADIOLOGY & ADDITIONAL TESTS:    Imaging Personally Reviewed:  [x ] YES  [ ] NO    Consultants involved in case:   Consultant(s) Notes Reviewed:  [ x] YES  [ ] NO:   Care Discussed with Consultants/Other Providers [x ] YES  [ ] NO

## 2020-02-24 NOTE — PROGRESS NOTE ADULT - PROBLEM SELECTOR PLAN 1
Unclear etiology. Per family this is rapidly progressing. Patient had some minor memory impairments roughly 30 days prior to admission when she started suffering from memory loss and paranoid delusions.   -Ddx includes psychosis/delirium 2/2 hypothyroidism. PE significant for myxedema. , free T4 0.3. Endocrine following. Now on IV synthroid. Ddx also includes FT dementia and CJD.   -MRI brain 3mm right sided SDH no neurosurgical intervention   -LP 2/6 CSF studies wnl.   -Paraneoplastic panel neg  -UTI s/p zosyn, now with CT abdomen concerning for pyelo. Zosyn restarted 2/20  -VEEG no seizure activity   - rheumatology state rheum origin less likely given neg for inflammatory changes; serum ALESSIA, ZAFAR, ribosomal P, dsdna neg, NMDRAb neg   - DVT ppx changed to SCDs given SDH  -psych states catatonia unlikely, can use trial of ativan  -repeat head CT no acute findings 2/20  -CXR clear lungs  -UA large LE  -2/23 T4 5.5 wnl, T3 53 LOW  -hx of abdominal pain, AMS, possible echols lines on physical exam therefore lead level sent  -Ceruloplasmin wnl

## 2020-02-24 NOTE — PROGRESS NOTE ADULT - PROBLEM SELECTOR PLAN 1
-test BG Q6h  Change NPH regimen to 14 units @ 6am and c/w 6 units @ (12am, 12pm, 6pm) for now. Please hold NPH if tube feeds off  -c/w Humalog moderate correction scale Q6h  -discharge plan: TBD based on nutrition plan  -f/u outpt w/Dr Saul  --Plan discussed with pt/team.  Contact info: [phone] (24/7). pager [phone]

## 2020-02-24 NOTE — CHART NOTE - NSCHARTNOTEFT_GEN_A_CORE
Nutrition Follow Up Note    Patient seen for malnutrition follow up.    Source: RN, EMR    Chart reviewed, events noted.      Diet: NPO with Enteral Feeds via NGT    Patient with no documented GI distress, BM .    Enteral/Parenteral Nutrition: Pt receiving Glucerna 1.2 with goal of 60mL x 24 hrs (1440mL), per chart notes and RN pt tolerating continuous feeds at goal.  Pt pulled out NGT overnight -, otherwise no other documented feed interruptions.    Daily Weight in k.2 (), 93.3 kg (), 94.3 (, dosing weight)   no new weights to assess, will continue to monitor     Pertinent Medications: MEDICATIONS  (STANDING):  acetaminophen  IVPB .. 1000 milliGRAM(s) IV Intermittent once  budesonide  80 MICROgram(s)/formoterol 4.5 MICROgram(s) Inhaler 2 Puff(s) Inhalation two times a day  dextrose 5%. 1000 milliLiter(s) (50 mL/Hr) IV Continuous <Continuous>  dextrose 50% Injectable 12.5 Gram(s) IV Push once  dextrose 50% Injectable 25 Gram(s) IV Push once  dextrose 50% Injectable 25 Gram(s) IV Push once  fluconAZOLE   40 mG/mL Suspension 200 milliGRAM(s) Enteral Tube daily  heparin  Injectable 5000 Unit(s) SubCutaneous every 8 hours  influenza   Vaccine 0.5 milliLiter(s) IntraMuscular once  insulin lispro (HumaLOG) corrective regimen sliding scale   SubCutaneous every 6 hours  insulin NPH human recombinant 10 Unit(s) SubCutaneous <User Schedule>  insulin NPH human recombinant 6 Unit(s) SubCutaneous <User Schedule>  levothyroxine Injectable 87.5 MICROGram(s) IV Push at bedtime  methylPREDNISolone sodium succinate Injectable 12.5 milliGRAM(s) IV Push daily  metoprolol tartrate Injectable 2.5 milliGRAM(s) IV Push every 6 hours  multivitamin/minerals/iron Oral Solution (CENTRUM) 15 milliLiter(s) Oral daily  pantoprazole  Injectable 40 milliGRAM(s) IV Push daily  primidone 50 milliGRAM(s) Oral at bedtime  simvastatin 20 milliGRAM(s) Oral at bedtime  thiamine 100 milliGRAM(s) Oral daily    MEDICATIONS  (PRN):  acetaminophen  Suppository .. 650 milliGRAM(s) Rectal every 6 hours PRN Temp greater or equal to 38C (100.4F)  albuterol/ipratropium for Nebulization 3 milliLiter(s) Nebulizer every 6 hours PRN Shortness of Breath and/or Wheezing  dextrose 40% Gel 15 Gram(s) Oral once PRN Blood Glucose LESS THAN 70 milliGRAM(s)/deciLiter  glucagon  Injectable 1 milliGRAM(s) IntraMuscular once PRN Glucose <70 milliGRAM(s)/deciLiter  melatonin 3 milliGRAM(s) Oral at bedtime PRN Insomnia    Pertinent Labs:  @ 10:11: Na 142, BUN 12, Cr 0.79, <H>, K+ 3.8, Phos 2.7, Mg 1.8, Alk Phos --, ALT/SGPT --, AST/SGOT --, HbA1c --    Finger Sticks:  POCT Blood Glucose.: 374 mg/dL ( @ 11:59)  POCT Blood Glucose.: 176 mg/dL ( @ 05:44)  POCT Blood Glucose.: 157 mg/dL ( @ 23:53)  POCT Blood Glucose.: 227 mg/dL ( @ 17:40)    Skin per nursing documentation: free of pressure injuries  Edema per nursing documentation: +1 generalized edema    Estimated Needs:   [x] no change since previous assessment    Previous Nutrition Diagnosis: Moderate malnutrition, altered nutrition related lab values  Nutrition Diagnosis is ongoing, being addressed with enteral feeds    Recommend  1. Continue Glucerna1.2 with goal of 60mL/hr x 24hrs (1440mL).  At goal, regimen provides 1440 ml formula, 1728 kcal, 86.4 gm protein, 1159 ml free water (meeting 18.3 kcal/kg, 0.9 gm/kg using dosing weight 94.3 kg).  Defer free water flushes to team.  2. RD remains available to adjust TF formula/rate as needed.    Monitoring and Evaluation:     Continue to monitor nutritional intake, tolerance to diet prescription, weights, labs, skin integrity    RD remains available upon request and will follow up per protocol    Nadeen You RD, Pager # [phone] Nutrition Follow Up Note    Patient seen for malnutrition follow up.    Source: RN, EMR    Chart reviewed, events noted.      Diet: NPO with Enteral Feeds via NGT    Patient with no documented GI distress, BM .  Pt sleeping at time of visit.    Enteral/Parenteral Nutrition: Pt receiving Glucerna 1.2 with goal of 60mL x 24 hrs (1440mL), per chart notes and RN pt tolerating continuous feeds at goal.  Pt pulled out NGT overnight -, otherwise no other documented feed interruptions.    Daily Weight in k.2 (), 93.3 kg (), 94.3 (, dosing weight)   no new weights to assess, will continue to monitor     Pertinent Medications: MEDICATIONS  (STANDING):  acetaminophen  IVPB .. 1000 milliGRAM(s) IV Intermittent once  budesonide  80 MICROgram(s)/formoterol 4.5 MICROgram(s) Inhaler 2 Puff(s) Inhalation two times a day  dextrose 5%. 1000 milliLiter(s) (50 mL/Hr) IV Continuous <Continuous>  dextrose 50% Injectable 12.5 Gram(s) IV Push once  dextrose 50% Injectable 25 Gram(s) IV Push once  dextrose 50% Injectable 25 Gram(s) IV Push once  fluconAZOLE   40 mG/mL Suspension 200 milliGRAM(s) Enteral Tube daily  heparin  Injectable 5000 Unit(s) SubCutaneous every 8 hours  influenza   Vaccine 0.5 milliLiter(s) IntraMuscular once  insulin lispro (HumaLOG) corrective regimen sliding scale   SubCutaneous every 6 hours  insulin NPH human recombinant 10 Unit(s) SubCutaneous <User Schedule>  insulin NPH human recombinant 6 Unit(s) SubCutaneous <User Schedule>  levothyroxine Injectable 87.5 MICROGram(s) IV Push at bedtime  methylPREDNISolone sodium succinate Injectable 12.5 milliGRAM(s) IV Push daily  metoprolol tartrate Injectable 2.5 milliGRAM(s) IV Push every 6 hours  multivitamin/minerals/iron Oral Solution (CENTRUM) 15 milliLiter(s) Oral daily  pantoprazole  Injectable 40 milliGRAM(s) IV Push daily  primidone 50 milliGRAM(s) Oral at bedtime  simvastatin 20 milliGRAM(s) Oral at bedtime  thiamine 100 milliGRAM(s) Oral daily    MEDICATIONS  (PRN):  acetaminophen  Suppository .. 650 milliGRAM(s) Rectal every 6 hours PRN Temp greater or equal to 38C (100.4F)  albuterol/ipratropium for Nebulization 3 milliLiter(s) Nebulizer every 6 hours PRN Shortness of Breath and/or Wheezing  dextrose 40% Gel 15 Gram(s) Oral once PRN Blood Glucose LESS THAN 70 milliGRAM(s)/deciLiter  glucagon  Injectable 1 milliGRAM(s) IntraMuscular once PRN Glucose <70 milliGRAM(s)/deciLiter  melatonin 3 milliGRAM(s) Oral at bedtime PRN Insomnia    Pertinent Labs:  @ 10:11: Na 142, BUN 12, Cr 0.79, <H>, K+ 3.8, Phos 2.7, Mg 1.8, Alk Phos --, ALT/SGPT --, AST/SGOT --, HbA1c --    Finger Sticks:  POCT Blood Glucose.: 374 mg/dL ( @ 11:59)  POCT Blood Glucose.: 176 mg/dL ( @ 05:44)  POCT Blood Glucose.: 157 mg/dL ( @ 23:53)  POCT Blood Glucose.: 227 mg/dL ( @ 17:40)    Skin per nursing documentation: free of pressure injuries  Edema per nursing documentation: +1 generalized edema    Estimated Needs:   [x] no change since previous assessment    Previous Nutrition Diagnosis: Moderate malnutrition, altered nutrition related lab values  Nutrition Diagnosis is ongoing, being addressed with enteral feeds    Recommend  1. Continue Glucerna1.2 with goal of 60mL/hr x 24hrs (1440mL).  At goal, regimen provides 1440 ml formula, 1728 kcal, 86.4 gm protein, 1159 ml free water (meeting 18.3 kcal/kg, 0.9 gm/kg using dosing weight 94.3 kg).  Defer free water flushes to team.  2. RD remains available to adjust TF formula/rate as needed.    Monitoring and Evaluation:     Continue to monitor nutritional intake, tolerance to diet prescription, weights, labs, skin integrity    RD remains available upon request and will follow up per protocol    Nadeen You RD, Pager # [phone]

## 2020-02-25 LAB
ALBUMIN SERPL ELPH-MCNC: 3.7 G/DL — SIGNIFICANT CHANGE UP (ref 3.3–5)
ALP SERPL-CCNC: 72 U/L — SIGNIFICANT CHANGE UP (ref 40–120)
ALT FLD-CCNC: 11 U/L — SIGNIFICANT CHANGE UP (ref 10–45)
ANION GAP SERPL CALC-SCNC: 14 MMOL/L — SIGNIFICANT CHANGE UP (ref 5–17)
AST SERPL-CCNC: 12 U/L — SIGNIFICANT CHANGE UP (ref 10–40)
BILIRUB SERPL-MCNC: 0.1 MG/DL — LOW (ref 0.2–1.2)
BUN SERPL-MCNC: 14 MG/DL — SIGNIFICANT CHANGE UP (ref 7–23)
CALCIUM SERPL-MCNC: 9.8 MG/DL — SIGNIFICANT CHANGE UP (ref 8.4–10.5)
CHLORIDE SERPL-SCNC: 104 MMOL/L — SIGNIFICANT CHANGE UP (ref 96–108)
CO2 SERPL-SCNC: 27 MMOL/L — SIGNIFICANT CHANGE UP (ref 22–31)
CREAT SERPL-MCNC: 0.77 MG/DL — SIGNIFICANT CHANGE UP (ref 0.5–1.3)
CULTURE RESULTS: SIGNIFICANT CHANGE UP
CULTURE RESULTS: SIGNIFICANT CHANGE UP
GLUCOSE BLDC GLUCOMTR-MCNC: 214 MG/DL — HIGH (ref 70–99)
GLUCOSE BLDC GLUCOMTR-MCNC: 255 MG/DL — HIGH (ref 70–99)
GLUCOSE BLDC GLUCOMTR-MCNC: 265 MG/DL — HIGH (ref 70–99)
GLUCOSE BLDC GLUCOMTR-MCNC: 413 MG/DL — HIGH (ref 70–99)
GLUCOSE SERPL-MCNC: 220 MG/DL — HIGH (ref 70–99)
HCT VFR BLD CALC: 29.9 % — LOW (ref 34.5–45)
HGB BLD-MCNC: 9.2 G/DL — LOW (ref 11.5–15.5)
MAGNESIUM SERPL-MCNC: 1.8 MG/DL — SIGNIFICANT CHANGE UP (ref 1.6–2.6)
MCHC RBC-ENTMCNC: 30.8 GM/DL — LOW (ref 32–36)
MCHC RBC-ENTMCNC: 31 PG — SIGNIFICANT CHANGE UP (ref 27–34)
MCV RBC AUTO: 100.7 FL — HIGH (ref 80–100)
NRBC # BLD: 2 /100 WBCS — HIGH (ref 0–0)
PHOSPHATE SERPL-MCNC: 2.6 MG/DL — SIGNIFICANT CHANGE UP (ref 2.5–4.5)
PLATELET # BLD AUTO: 403 K/UL — HIGH (ref 150–400)
POTASSIUM SERPL-MCNC: 3.5 MMOL/L — SIGNIFICANT CHANGE UP (ref 3.5–5.3)
POTASSIUM SERPL-SCNC: 3.5 MMOL/L — SIGNIFICANT CHANGE UP (ref 3.5–5.3)
PROT SERPL-MCNC: 6.6 G/DL — SIGNIFICANT CHANGE UP (ref 6–8.3)
RAPID RVP RESULT: SIGNIFICANT CHANGE UP
RBC # BLD: 2.97 M/UL — LOW (ref 3.8–5.2)
RBC # FLD: 14.5 % — SIGNIFICANT CHANGE UP (ref 10.3–14.5)
SODIUM SERPL-SCNC: 145 MMOL/L — SIGNIFICANT CHANGE UP (ref 135–145)
SPECIMEN SOURCE: SIGNIFICANT CHANGE UP
SPECIMEN SOURCE: SIGNIFICANT CHANGE UP
WBC # BLD: 11.82 K/UL — HIGH (ref 3.8–10.5)
WBC # FLD AUTO: 11.82 K/UL — HIGH (ref 3.8–10.5)

## 2020-02-25 PROCEDURE — 99232 SBSQ HOSP IP/OBS MODERATE 35: CPT

## 2020-02-25 PROCEDURE — 99232 SBSQ HOSP IP/OBS MODERATE 35: CPT | Mod: GC

## 2020-02-25 PROCEDURE — 99223 1ST HOSP IP/OBS HIGH 75: CPT

## 2020-02-25 RX ORDER — HUMAN INSULIN 100 [IU]/ML
10 INJECTION, SUSPENSION SUBCUTANEOUS
Refills: 0 | Status: DISCONTINUED | OUTPATIENT
Start: 2020-02-25 | End: 2020-02-26

## 2020-02-25 RX ORDER — HUMAN INSULIN 100 [IU]/ML
20 INJECTION, SUSPENSION SUBCUTANEOUS
Refills: 0 | Status: DISCONTINUED | OUTPATIENT
Start: 2020-02-25 | End: 2020-02-26

## 2020-02-25 RX ORDER — METOPROLOL TARTRATE 50 MG
25 TABLET ORAL
Refills: 0 | Status: DISCONTINUED | OUTPATIENT
Start: 2020-02-25 | End: 2020-03-01

## 2020-02-25 RX ORDER — HUMAN INSULIN 100 [IU]/ML
10 INJECTION, SUSPENSION SUBCUTANEOUS
Refills: 0 | Status: DISCONTINUED | OUTPATIENT
Start: 2020-02-25 | End: 2020-02-25

## 2020-02-25 RX ORDER — LOSARTAN POTASSIUM 100 MG/1
25 TABLET, FILM COATED ORAL DAILY
Refills: 0 | Status: DISCONTINUED | OUTPATIENT
Start: 2020-02-25 | End: 2020-03-01

## 2020-02-25 RX ADMIN — Medication 15 MILLILITER(S): at 12:26

## 2020-02-25 RX ADMIN — Medication 12.5 MILLIGRAM(S): at 05:54

## 2020-02-25 RX ADMIN — HEPARIN SODIUM 5000 UNIT(S): 5000 INJECTION INTRAVENOUS; SUBCUTANEOUS at 13:37

## 2020-02-25 RX ADMIN — Medication 25 MILLIGRAM(S): at 18:13

## 2020-02-25 RX ADMIN — HUMAN INSULIN 10 UNIT(S): 100 INJECTION, SUSPENSION SUBCUTANEOUS at 18:25

## 2020-02-25 RX ADMIN — Medication 4: at 05:54

## 2020-02-25 RX ADMIN — Medication 12: at 13:35

## 2020-02-25 RX ADMIN — LOSARTAN POTASSIUM 25 MILLIGRAM(S): 100 TABLET, FILM COATED ORAL at 12:28

## 2020-02-25 RX ADMIN — PRIMIDONE 50 MILLIGRAM(S): 250 TABLET ORAL at 23:54

## 2020-02-25 RX ADMIN — SIMVASTATIN 20 MILLIGRAM(S): 20 TABLET, FILM COATED ORAL at 23:54

## 2020-02-25 RX ADMIN — HEPARIN SODIUM 5000 UNIT(S): 5000 INJECTION INTRAVENOUS; SUBCUTANEOUS at 05:53

## 2020-02-25 RX ADMIN — HUMAN INSULIN 6 UNIT(S): 100 INJECTION, SUSPENSION SUBCUTANEOUS at 00:00

## 2020-02-25 RX ADMIN — Medication 2.5 MILLIGRAM(S): at 05:53

## 2020-02-25 RX ADMIN — HEPARIN SODIUM 5000 UNIT(S): 5000 INJECTION INTRAVENOUS; SUBCUTANEOUS at 23:54

## 2020-02-25 RX ADMIN — HUMAN INSULIN 10 UNIT(S): 100 INJECTION, SUSPENSION SUBCUTANEOUS at 13:49

## 2020-02-25 RX ADMIN — Medication 100 MILLIGRAM(S): at 12:26

## 2020-02-25 RX ADMIN — Medication 6: at 18:25

## 2020-02-25 RX ADMIN — PANTOPRAZOLE SODIUM 40 MILLIGRAM(S): 20 TABLET, DELAYED RELEASE ORAL at 12:27

## 2020-02-25 RX ADMIN — HUMAN INSULIN 14 UNIT(S): 100 INJECTION, SUSPENSION SUBCUTANEOUS at 05:55

## 2020-02-25 RX ADMIN — Medication 87.5 MICROGRAM(S): at 23:53

## 2020-02-25 RX ADMIN — FLUCONAZOLE 200 MILLIGRAM(S): 150 TABLET ORAL at 13:11

## 2020-02-25 RX ADMIN — BUDESONIDE AND FORMOTEROL FUMARATE DIHYDRATE 2 PUFF(S): 160; 4.5 AEROSOL RESPIRATORY (INHALATION) at 05:27

## 2020-02-25 NOTE — PROGRESS NOTE ADULT - PROBLEM SELECTOR PLAN 2
Noted TFT not reliable at this time since pt recently started Levothyroxine therapy and med was not given on an empty stomach most of the time due to AMS.  Expecting TSH to improve since pt is getting med via IV now  -C/w levothyroxine 87.5 MICROGram(s) IVP daily.  -Re test in 4-6 weeks

## 2020-02-25 NOTE — PROGRESS NOTE ADULT - ATTENDING COMMENTS
Subacute Encephalopathy: unclear etiology, possibly frontotemporal dementia with behavioral disturbances and agitation per neuro  -eyes open, nonverbal and does not follow commands  -did have severe hypothyroidism on admission but now on synthroid with improving TFT, appreciate endo recs, needs repeat in 4-6 weeks  -also had SDH which is stable on imaging  -brain CT and MRI with no acute findings  -s/p LP, multiple negative CSF studies including, autoimmune, paraneoplastic panel, Purkinje fibers, etc all negative  -EEG showing diffuse cerebral slowing/dysfunction- no epileptiform activity  -avoid depakote,   -f/u with neuro if any other recs    Candida UTI:  UA impressive but afebrile, doubt this is causing AMS, discussed with ID Dr Gomez, complete 5-7 day course of fluconazole    Nutrition, c/w NG feeds,, may need PEG depending on GOC   poor prognosis   palliative eval to help with GOC/dispo given multiple children

## 2020-02-25 NOTE — PROGRESS NOTE ADULT - PROBLEM SELECTOR PLAN 2
SIRS criteria on 2/14 w/ Tmax 100.5 and tachycardia. UA w/ post nitrites, LE, many bacteria. UCx E.Coli pansensitive s/p x 4 days zosyn (2/14-2/18)  -positive UA 2/20 zosyn x 3 days, however urine cx positive for candida only therefore zosyn d/c  - c/w fluconazole for x2 weeks SIRS criteria on 2/14 w/ Tmax 100.5 and tachycardia. UA w/ post nitrites, LE, many bacteria. UCx E.Coli pansensitive s/p x 4 days zosyn (2/14-2/18)  -positive UA 2/20 zosyn x 3 days, however urine cx positive for candida only therefore zosyn d/c  - c/w fluconazole for 5-7 days per ID but unlikely contributing to AMS

## 2020-02-25 NOTE — PROGRESS NOTE ADULT - PROBLEM SELECTOR PROBLEM 1
Reason for visit: Sinus Problem (cough, loss of voice, pain in chest from coughing)      HPI:Cc \"my sinuses are bothering me\" for a week and the cough associated with this is causing pain w breathing just since yesterday  Pt. [place] chest pain w the cough just yesterday not today. Has known arthritic knee problems, saw ortho and had PT; told she doesn't have full extension of the knee \"so there is nothing that can be done\"  Had a PCP but doesn't go for wellness checks, at least couple yrs since last PE, had an insurance change and didn't stay connected w that PCP. Only meds are Benadryl and Tylenol.  Reports \"lots of allergies since childhood\" but no hx asthma or wheezing. Sleeps propped up w several pillows at night, has bilateral leg edema which she states is normal for her. Social: works in a  w lots of illness exposures.       ROS: Review of Systems   Constitutional: Positive for activity change and fever (\"maybe a hundred\"  Normal temp is low). Negative for appetite change.   HENT: Positive for congestion, sinus pressure and sinus pain.    Respiratory: Positive for cough. Negative for wheezing. Choking: productive.    Musculoskeletal: Positive for arthralgias (knee pain) and myalgias (\"my whole body hurts\" generalized body aches).   Allergic/Immunologic: Positive for environmental allergies.       Patient's medications, allergies, past medical, surgical, social and family histories were reviewed and updated as appropriate.    Allergies:   ALLERGIES:   Allergen Reactions   • Sulfa Antibiotics HIVES       Physical Exam: Physical Exam   Constitutional: She is oriented to person, place, and time. She appears well-developed and well-nourished.   HENT:   Head: Normocephalic and atraumatic.   Right Ear: External ear normal.   Left Ear: External ear normal.   Mouth/Throat: Oropharynx is clear and moist.   Has a hx of sinus surgery   Cardiovascular: Normal rate, regular rhythm, normal heart sounds and intact distal  pulses.   tacchcardic throughout visit   Pulmonary/Chest: Tachypnea noted. She is in respiratory distress. She has decreased breath sounds in the right middle field, the right lower field, the left middle field and the left lower field. She has rhonchi in the right upper field and the left upper field. She has no rales. She exhibits no retraction.   Obvious dyspnea w sitting at rest and worsened sl w exertion of getting on exam table Musculoskeletal:      Comments: Ambulates slowly w sideways waddle, great difficulty climbing exam table.      Neurological: She is alert and oriented to person, place, and time.       Results:No results found for this visit on 11/19/19.    Assessment/Plan:    Shortness of breath  - albuterol (ACCUNEB) nebulizer 0.84 mg  Pt. Did not improve clinically and decision was made to transport her to ED at [facility].  Pt. Initially did want to go to ED due to fear of cost of ambulance and ED.  Informed her that she is not safe to drive in current condition, hypertensive and breathing poorly, not walking easily.  Patient to follow-up with PCP.    Maya Win, CNP     Encephalopathy

## 2020-02-25 NOTE — CONSULT NOTE ADULT - ASSESSMENT
64 F hx functional paraplegia bedbound, hypothyroidism, pulmonary HTN, DM on insulin, COPD/CHRIS on noctournal CPAP and 3L home), RA on chronic prednisone, chronic tremors, SBO s/p ex lap with lysis of adhesions (4 retention sutures), prior history enterococcus bacteremia.  Leukocytosis, prior fevers  Worsening AMS  UA positive, UCX Candida  CT A/P neg, without signs infection (no signs of invasive fungal infection)  CXR clear on my personal review without signs pna  Unclear cause worsening AMS, uncertain if infection  Patient recent on intermittent course of Zosyn which presumably would have treated aspiration event  Overall,  1) UTI  - Doubtful this is fungal cystitis causing AMS, but given lack of other cause for infectious AMS, give brief course  - Continue Fluconazole, likely 5-7 day course  - Monitor mental status for improvement  - F/U pending BCX  - If non improving, consider CT Chest  - Continue off other abx for now, but low threshold to start broad spec abx if clinical worsening  - Ordered RVP, PCT  2) Leukocytosis  - Monitor, trend to normal  3) AMS  - Monitor, trend to normal  - Noninfectious causes AMS per primary team    Nabor Gomez MD  Pager [phone]  After 5pm and on weekends call [phone]

## 2020-02-25 NOTE — PROGRESS NOTE ADULT - PROBLEM SELECTOR PLAN 4
Patient w/ anterior abdominal wound from SBO revision   - wound care following; change dressing every 7 days; changed 2/6, 2/13  - wound base clean w/o infection wound care follwoing
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
## 2020-02-25 NOTE — PROGRESS NOTE ADULT - PROBLEM SELECTOR PLAN 1
-test BG Q6h  Change NPH regimen to 20 units @ 6am and 10 units @ (12am, 12pm, 6pm) for now. Please hold NPH if tube feeds off  -c/w Humalog moderate correction scale Q6h  -discharge plan: TBD based on nutrition plan  -f/u outpt w/Dr Saul  --Plan discussed with pt/team.  Contact info: [phone] (24/7). pager [phone]

## 2020-02-25 NOTE — PROGRESS NOTE ADULT - PROBLEM SELECTOR PLAN 6
On met ER 50 at home  - c/w IV metoprolol 2.5 q6hrs On met ER 50 at home  - c/w IV metoprolol 2.5 q6hrs  -persistently hypertensive at this time. Will start hydralazine 25mg tid bp elevated  -on toprol 50mg ER at home, d/c IV metoprolol, change to po tartrate 25mg bid and start losartan 25mg given diabetes

## 2020-02-25 NOTE — PROGRESS NOTE ADULT - PROBLEM SELECTOR PLAN 1
Unclear etiology. Per family this is rapidly progressing. Patient had some minor memory impairments roughly 30 days prior to admission when she started suffering from memory loss and paranoid delusions.   -Ddx includes psychosis/delirium 2/2 hypothyroidism. PE significant for myxedema. , free T4 0.3. Endocrine following. Now on IV synthroid. Ddx also includes FT dementia and CJD.   -MRI brain 3mm right sided SDH no neurosurgical intervention   -LP 2/6 CSF studies wnl.   -Paraneoplastic panel neg  -UTI s/p zosyn, now with CT abdomen concerning for pyelo. Zosyn restarted 2/20  -VEEG no seizure activity   - rheumatology state rheum origin less likely given neg for inflammatory changes; serum ALESSIA, ZAFAR, ribosomal P, dsdna neg, NMDRAb neg   - DVT ppx changed to SCDs given SDH  -psych states catatonia unlikely, can use trial of ativan  -repeat head CT no acute findings 2/20  -CXR clear lungs  -UA large LE  -2/23 T4 5.5 wnl, T3 53 LOW  -hx of abdominal pain, AMS, possible echols lines on physical exam therefore lead level sent  -Ceruloplasmin wnl Unclear etiology. Per family this is rapidly progressing. Patient had some minor memory impairments roughly 30 days prior to admission when she started suffering from memory loss and paranoid delusions.   -Ddx includes psychosis/delirium 2/2 hypothyroidism. PE significant for myxedema. , free T4 0.3. Endocrine following. Now on IV synthroid. Ddx also includes FT dementia and CJD.   -MRI brain 3mm right sided SDH no neurosurgical intervention   -LP 2/6 CSF studies wnl.   -Paraneoplastic panel neg  -UTI s/p zosyn, now with CT abdomen concerning for pyelo. Zosyn restarted 2/20  -VEEG no seizure activity   - rheumatology state rheum origin less likely given neg for inflammatory changes; serum ALESSIA, ZAFAR, ribosomal P, dsdna neg, NMDRAb neg   - DVT ppx changed to SCDs given SDH  -psych states catatonia unlikely, can use trial of ativan  -repeat head CT no acute findings 2/20  -CXR clear lungs  -UA large LE  -2/23 T4 5.5 wnl, T3 53 LOW  -lead level negative   -Ceruloplasmin wnl Unclear etiology. Per family this is rapidly progressing. Patient had some minor memory impairments roughly 30 days prior to admission when she started suffering from memory loss and paranoid delusions.   -Ddx includes psychosis/delirium 2/2 hypothyroidism. PE significant for myxedema. , free T4 0.3. Endocrine following. Now on IV synthroid. Ddx also includes FT dementia   -MRI brain 3mm right sided SDH no neurosurgical intervention   -LP 2/6 CSF studies wnl.   -Paraneoplastic panel neg  -VEEG no seizure activity   - rheumatology state rheum origin less likely given neg for inflammatory changes; serum ALESSIA, ZAFAR, ribosomal P, dsdna neg, NMDRAb neg   -psych states catatonia unlikely, can use trial of ativan  -repeat head CT no acute findings 2/20  -2/23 T4 5.5 wnl, T3 53 LOW  -lead level negative   -Ceruloplasmin wnl

## 2020-02-25 NOTE — PROGRESS NOTE ADULT - PROBLEM SELECTOR PLAN 5
-Continuous tube feeds started via NG tube 2/20  -moderate ISS   -fingersticks q6hrs  - d/c Lantus  -NPH 6U q6hrs while on tube feeds  -c/w moderate ISS -Continuous tube feeds started via NG tube 2/20  -moderate ISS   -fingersticks q6hrs  - d/c Lantus  -NPH regimen 14 units @ 6am, NPH 6 units @ (12am, 12pm, 6pm)  -c/w moderate ISS -Continuous tube feeds started via NG tube 2/20  -moderate ISS   -fingersticks q6hrs  -NPH regimen 14 units @ 6am, NPH 6 units @ (12am, 12pm, 6pm)  -c/w moderate ISS, endocrine adjusting

## 2020-02-25 NOTE — PROGRESS NOTE ADULT - PROBLEM SELECTOR PLAN 7
- Bcx + for GPR on 1/12, 1 out of 2 bottles. +Bcx from 1/12 sent to [facility] laboratories, no further speciation available.   - Blood cx obtained on 1/25 no growth X 5 days. Repeat 2/14 NGTD.   - vancomycin started 2/14 d/michael as repeat BCx NGTD  - repeat blood cx from 2/20 with NGTD - Bcx + for GPR on 1/12, 1 out of 2 bottles. +Bcx from 1/12 sent to [facility] laboratories, no further speciation available.   - Blood cx obtained on 1/25 no growth X 5 days. Repeat 2/14 NGTD.   - repeat blood cultures negative  -apprecaite ID recs

## 2020-02-25 NOTE — PROGRESS NOTE ADULT - SUBJECTIVE AND OBJECTIVE BOX
***************************************************************  Logan Bryan, PGY1  Internal Medicine   pager: [phone]  ***************************************************************    AFTAB BASS  64y  MRN: [id number]    Patient is a 64y old  Female who presents with a chief complaint of change in mental status (24 Feb 2020 17:27)      Subjective: no events ON. Denies fever, CP, SOB, abn pain, N/V, dysuria. Tolerating diet.      MEDICATIONS  (STANDING):  acetaminophen  IVPB .. 1000 milliGRAM(s) IV Intermittent once  budesonide  80 MICROgram(s)/formoterol 4.5 MICROgram(s) Inhaler 2 Puff(s) Inhalation two times a day  dextrose 5%. 1000 milliLiter(s) (50 mL/Hr) IV Continuous <Continuous>  dextrose 50% Injectable 12.5 Gram(s) IV Push once  dextrose 50% Injectable 25 Gram(s) IV Push once  dextrose 50% Injectable 25 Gram(s) IV Push once  fluconAZOLE   40 mG/mL Suspension 200 milliGRAM(s) Enteral Tube daily  heparin  Injectable 5000 Unit(s) SubCutaneous every 8 hours  influenza   Vaccine 0.5 milliLiter(s) IntraMuscular once  insulin lispro (HumaLOG) corrective regimen sliding scale   SubCutaneous every 6 hours  insulin NPH human recombinant 6 Unit(s) SubCutaneous <User Schedule>  insulin NPH human recombinant 14 Unit(s) SubCutaneous <User Schedule>  levothyroxine Injectable 87.5 MICROGram(s) IV Push at bedtime  methylPREDNISolone sodium succinate Injectable 12.5 milliGRAM(s) IV Push daily  metoprolol tartrate Injectable 2.5 milliGRAM(s) IV Push every 6 hours  multivitamin/minerals/iron Oral Solution (CENTRUM) 15 milliLiter(s) Oral daily  pantoprazole  Injectable 40 milliGRAM(s) IV Push daily  primidone 50 milliGRAM(s) Oral at bedtime  simvastatin 20 milliGRAM(s) Oral at bedtime  thiamine 100 milliGRAM(s) Oral daily    MEDICATIONS  (PRN):  acetaminophen  Suppository .. 650 milliGRAM(s) Rectal every 6 hours PRN Temp greater or equal to 38C (100.4F)  albuterol/ipratropium for Nebulization 3 milliLiter(s) Nebulizer every 6 hours PRN Shortness of Breath and/or Wheezing  dextrose 40% Gel 15 Gram(s) Oral once PRN Blood Glucose LESS THAN 70 milliGRAM(s)/deciLiter  glucagon  Injectable 1 milliGRAM(s) IntraMuscular once PRN Glucose <70 milliGRAM(s)/deciLiter  melatonin 3 milliGRAM(s) Oral at bedtime PRN Insomnia      Objective:    Vitals: Vital Signs Last 24 Hrs  T(C): 36.3 (02-25-20 @ 04:29), Max: 37.3 (02-24-20 @ 13:58)  T(F): 97.4 (02-25-20 @ 04:29), Max: 99.1 (02-24-20 @ 13:58)  HR: 88 (02-25-20 @ 06:17) (88 - 107)  BP: 152/100 (02-25-20 @ 06:17) (124/62 - 172/110)  BP(mean): --  RR: 18 (02-25-20 @ 04:29) (16 - 18)  SpO2: 97% (02-25-20 @ 04:29) (91% - 97%)            I&O's Summary    24 Feb 2020 07:01  -  25 Feb 2020 07:00  --------------------------------------------------------  IN: 1820 mL / OUT: 0 mL / NET: 1820 mL    25 Feb 2020 07:01  -  25 Feb 2020 08:51  --------------------------------------------------------  IN: 120 mL / OUT: 0 mL / NET: 120 mL        PHYSICAL EXAM:  GENERAL: NAD, obese  HEAD:  Atraumatic, Normocephalic. NGT in place   EYES: EOMI, PERRLA, conjunctiva and sclera clear  NECK: Supple, No JVD  CHEST/LUNG: Clear to auscultation bilaterally anteriorly; No wheeze  HEART: Regular rate and rhythm; No murmurs, rubs, or gallops  ABDOMEN: Soft, Nontender, Nondistended; Bowel sounds present. midline surgical scar with dressing in place c/d/i  EXTREMITIES:  2+ Peripheral Pulses, No clubbing, cyanosis, or edema  PSYCH: AAOx0  NEUROLOGY: non-focal  SKIN: No rashes or lesions    LABS:  02-25    145  |  104  |  14  ----------------------------<  220<H>  3.5   |  27  |  0.77  02-24    142  |  104  |  12  ----------------------------<  302<H>  3.8   |  25  |  0.79  02-23    145  |  106  |  11  ----------------------------<  301<H>  3.9   |  21<L>  |  0.88    Ca    9.8      25 Feb 2020 07:00  Ca    9.8      24 Feb 2020 10:11  Ca    9.4      23 Feb 2020 12:31  Phos  2.6     02-25  Mg     1.8     02-25    TPro  6.6  /  Alb  3.7  /  TBili  0.1<L>  /  DBili  x   /  AST  12  /  ALT  11  /  AlkPhos  72  02-25                                              9.2    11.82 )-----------( 403      ( 25 Feb 2020 07:24 )             29.9                         9.5    12.71 )-----------( 376      ( 24 Feb 2020 07:16 )             31.0                         9.5    9.92  )-----------( 390      ( 23 Feb 2020 12:29 )             31.7     CAPILLARY BLOOD GLUCOSE      POCT Blood Glucose.: 214 mg/dL (25 Feb 2020 05:36)  POCT Blood Glucose.: 225 mg/dL (24 Feb 2020 23:47)  POCT Blood Glucose.: 288 mg/dL (24 Feb 2020 18:08)  POCT Blood Glucose.: 374 mg/dL (24 Feb 2020 11:59)      RADIOLOGY & ADDITIONAL TESTS:    Imaging Personally Reviewed:  [x ] YES  [ ] NO    Consultants involved in case:   Consultant(s) Notes Reviewed:  [ x] YES  [ ] NO:   Care Discussed with Consultants/Other Providers [x ] YES  [ ] NO ***************************************************************  Logan Bryan, PGY1  Internal Medicine   pager: [phone]  ***************************************************************    AFTAB BASS  64y  MRN: [id number]    Patient is a 64y old  Female who presents with a chief complaint of change in mental status (24 Feb 2020 17:27)      Subjective: no events ON. Patient opened eyes with brief episode of nonsensical speech. Otherwise patient remained non-verbal. Did not follow commands or respond to questions. Continuous tube feeds.     MEDICATIONS  (STANDING):  acetaminophen  IVPB .. 1000 milliGRAM(s) IV Intermittent once  budesonide  80 MICROgram(s)/formoterol 4.5 MICROgram(s) Inhaler 2 Puff(s) Inhalation two times a day  dextrose 5%. 1000 milliLiter(s) (50 mL/Hr) IV Continuous <Continuous>  dextrose 50% Injectable 12.5 Gram(s) IV Push once  dextrose 50% Injectable 25 Gram(s) IV Push once  dextrose 50% Injectable 25 Gram(s) IV Push once  fluconAZOLE   40 mG/mL Suspension 200 milliGRAM(s) Enteral Tube daily  heparin  Injectable 5000 Unit(s) SubCutaneous every 8 hours  influenza   Vaccine 0.5 milliLiter(s) IntraMuscular once  insulin lispro (HumaLOG) corrective regimen sliding scale   SubCutaneous every 6 hours  insulin NPH human recombinant 6 Unit(s) SubCutaneous <User Schedule>  insulin NPH human recombinant 14 Unit(s) SubCutaneous <User Schedule>  levothyroxine Injectable 87.5 MICROGram(s) IV Push at bedtime  methylPREDNISolone sodium succinate Injectable 12.5 milliGRAM(s) IV Push daily  metoprolol tartrate Injectable 2.5 milliGRAM(s) IV Push every 6 hours  multivitamin/minerals/iron Oral Solution (CENTRUM) 15 milliLiter(s) Oral daily  pantoprazole  Injectable 40 milliGRAM(s) IV Push daily  primidone 50 milliGRAM(s) Oral at bedtime  simvastatin 20 milliGRAM(s) Oral at bedtime  thiamine 100 milliGRAM(s) Oral daily    MEDICATIONS  (PRN):  acetaminophen  Suppository .. 650 milliGRAM(s) Rectal every 6 hours PRN Temp greater or equal to 38C (100.4F)  albuterol/ipratropium for Nebulization 3 milliLiter(s) Nebulizer every 6 hours PRN Shortness of Breath and/or Wheezing  dextrose 40% Gel 15 Gram(s) Oral once PRN Blood Glucose LESS THAN 70 milliGRAM(s)/deciLiter  glucagon  Injectable 1 milliGRAM(s) IntraMuscular once PRN Glucose <70 milliGRAM(s)/deciLiter  melatonin 3 milliGRAM(s) Oral at bedtime PRN Insomnia      Objective:    Vitals: Vital Signs Last 24 Hrs  T(C): 36.3 (02-25-20 @ 04:29), Max: 37.3 (02-24-20 @ 13:58)  T(F): 97.4 (02-25-20 @ 04:29), Max: 99.1 (02-24-20 @ 13:58)  HR: 88 (02-25-20 @ 06:17) (88 - 107)  BP: 152/100 (02-25-20 @ 06:17) (124/62 - 172/110)  BP(mean): --  RR: 18 (02-25-20 @ 04:29) (16 - 18)  SpO2: 97% (02-25-20 @ 04:29) (91% - 97%)            I&O's Summary    24 Feb 2020 07:01  -  25 Feb 2020 07:00  --------------------------------------------------------  IN: 1820 mL / OUT: 0 mL / NET: 1820 mL    25 Feb 2020 07:01  -  25 Feb 2020 08:51  --------------------------------------------------------  IN: 120 mL / OUT: 0 mL / NET: 120 mL        PHYSICAL EXAM:  GENERAL: NAD, obese  HEAD:  Atraumatic, Normocephalic. NGT in place   EYES: EOMI, PERRLA, conjunctiva and sclera clear  NECK: Supple, No JVD  CHEST/LUNG: Clear to auscultation bilaterally anteriorly; No wheeze  HEART: Regular rate and rhythm; No murmurs, rubs, or gallops  ABDOMEN: Soft, Nontender, Nondistended; Bowel sounds present. midline surgical scar with dressing in place c/d/i  EXTREMITIES:  2+ Peripheral Pulses, No clubbing, cyanosis, or edema  PSYCH: AAOx0  NEUROLOGY: non-focal  SKIN: No rashes or lesions    LABS:  02-25    145  |  104  |  14  ----------------------------<  220<H>  3.5   |  27  |  0.77  02-24    142  |  104  |  12  ----------------------------<  302<H>  3.8   |  25  |  0.79  02-23    145  |  106  |  11  ----------------------------<  301<H>  3.9   |  21<L>  |  0.88    Ca    9.8      25 Feb 2020 07:00  Ca    9.8      24 Feb 2020 10:11  Ca    9.4      23 Feb 2020 12:31  Phos  2.6     02-25  Mg     1.8     02-25    TPro  6.6  /  Alb  3.7  /  TBili  0.1<L>  /  DBili  x   /  AST  12  /  ALT  11  /  AlkPhos  72  02-25                                              9.2    11.82 )-----------( 403      ( 25 Feb 2020 07:24 )             29.9                         9.5    12.71 )-----------( 376      ( 24 Feb 2020 07:16 )             31.0                         9.5    9.92  )-----------( 390      ( 23 Feb 2020 12:29 )             31.7     CAPILLARY BLOOD GLUCOSE      POCT Blood Glucose.: 214 mg/dL (25 Feb 2020 05:36)  POCT Blood Glucose.: 225 mg/dL (24 Feb 2020 23:47)  POCT Blood Glucose.: 288 mg/dL (24 Feb 2020 18:08)  POCT Blood Glucose.: 374 mg/dL (24 Feb 2020 11:59)      RADIOLOGY & ADDITIONAL TESTS:    Imaging Personally Reviewed:  [x ] YES  [ ] NO    Consultants involved in case:   Consultant(s) Notes Reviewed:  [ x] YES  [ ] NO:   Care Discussed with Consultants/Other Providers [x ] YES  [ ] NO

## 2020-02-25 NOTE — PROGRESS NOTE ADULT - ASSESSMENT
63 y/o F w/h/o uncontrolled T2DM with unknown complications. Also h/o functional paraplegia, hypothyroidism, pulmonary hypertension, COPD, RA on chronic prednisone, SBO s/p ex lap with lysis of adhesions c/b multifocal pneumonia with AMS of unknown etiology. Now on continuous tube feeds w glycemic control not at goal while on present insulin doses even though doses were increased yesterday. Will increase NPH doses further to BG goal 100s to 180s. No hypoglycemia.

## 2020-02-25 NOTE — PROGRESS NOTE ADULT - PROBLEM SELECTOR PLAN 8
- prednisone 15 mg switched to solumedrol 12.5mg qdaily   - steroids are not suspectd to be cause of ams  - Tramadol PRN - prednisone 15 mg switched to solumedrol 12.5mg qdaily

## 2020-02-25 NOTE — PROGRESS NOTE ADULT - PROBLEM SELECTOR PLAN 10
- DVT ppx: HSQ q8hrs   - Diet: continuous tube feeds   - Dispo: [facility]   Transitions of Care Status:  1.  Name of PCP:  Kristie Nails MD (PCP)  2.  PCP Contacted on Admission: [ ] Y    [x] N    3.  PCP contacted at Discharge: [ ] Y    [ ] N    [ ] N/A  4.  Post-Discharge Appointment Date and Location:  5.  Summary of Handoff given to PCP: - DVT ppx: HSQ q8hrs   - Diet: continuous NG feeds, may need PEG, pallative consult to assist with GOC/dispo  - Dispo: [facility]   Transitions of Care Status:  1.  Name of PCP:  Kristie Nails MD (PCP)  2.  PCP Contacted on Admission: [ ] Y    [x] N    3.  PCP contacted at Discharge: [ ] Y    [ ] N    [ ] N/A  4.  Post-Discharge Appointment Date and Location:  5.  Summary of Handoff given to PCP:

## 2020-02-26 DIAGNOSIS — Z51.5 ENCOUNTER FOR PALLIATIVE CARE: ICD-10-CM

## 2020-02-26 DIAGNOSIS — F06.1 CATATONIC DISORDER DUE TO KNOWN PHYSIOLOGICAL CONDITION: ICD-10-CM

## 2020-02-26 DIAGNOSIS — R41.82 ALTERED MENTAL STATUS, UNSPECIFIED: ICD-10-CM

## 2020-02-26 DIAGNOSIS — E03.9 HYPOTHYROIDISM, UNSPECIFIED: ICD-10-CM

## 2020-02-26 LAB
ANION GAP SERPL CALC-SCNC: 14 MMOL/L — SIGNIFICANT CHANGE UP (ref 5–17)
ANISOCYTOSIS BLD QL: SLIGHT — SIGNIFICANT CHANGE UP
BASOPHILS # BLD AUTO: 0 K/UL — SIGNIFICANT CHANGE UP (ref 0–0.2)
BASOPHILS NFR BLD AUTO: 0 % — SIGNIFICANT CHANGE UP (ref 0–2)
BUN SERPL-MCNC: 17 MG/DL — SIGNIFICANT CHANGE UP (ref 7–23)
CALCIUM SERPL-MCNC: 9.7 MG/DL — SIGNIFICANT CHANGE UP (ref 8.4–10.5)
CHLORIDE SERPL-SCNC: 103 MMOL/L — SIGNIFICANT CHANGE UP (ref 96–108)
CO2 SERPL-SCNC: 26 MMOL/L — SIGNIFICANT CHANGE UP (ref 22–31)
CREAT SERPL-MCNC: 0.76 MG/DL — SIGNIFICANT CHANGE UP (ref 0.5–1.3)
EOSINOPHIL # BLD AUTO: 0.37 K/UL — SIGNIFICANT CHANGE UP (ref 0–0.5)
EOSINOPHIL NFR BLD AUTO: 2.8 % — SIGNIFICANT CHANGE UP (ref 0–6)
GLUCOSE BLDC GLUCOMTR-MCNC: 164 MG/DL — HIGH (ref 70–99)
GLUCOSE BLDC GLUCOMTR-MCNC: 168 MG/DL — HIGH (ref 70–99)
GLUCOSE BLDC GLUCOMTR-MCNC: 198 MG/DL — HIGH (ref 70–99)
GLUCOSE BLDC GLUCOMTR-MCNC: 239 MG/DL — HIGH (ref 70–99)
GLUCOSE BLDC GLUCOMTR-MCNC: 351 MG/DL — HIGH (ref 70–99)
GLUCOSE BLDC GLUCOMTR-MCNC: 372 MG/DL — HIGH (ref 70–99)
GLUCOSE SERPL-MCNC: 167 MG/DL — HIGH (ref 70–99)
HCT VFR BLD CALC: 31.4 % — LOW (ref 34.5–45)
HGB BLD-MCNC: 9.7 G/DL — LOW (ref 11.5–15.5)
LYMPHOCYTES # BLD AUTO: 1.58 K/UL — SIGNIFICANT CHANGE UP (ref 1–3.3)
LYMPHOCYTES # BLD AUTO: 12 % — LOW (ref 13–44)
MACROCYTES BLD QL: SLIGHT — SIGNIFICANT CHANGE UP
MAGNESIUM SERPL-MCNC: 2 MG/DL — SIGNIFICANT CHANGE UP (ref 1.6–2.6)
MANUAL SMEAR VERIFICATION: SIGNIFICANT CHANGE UP
MCHC RBC-ENTMCNC: 30.9 GM/DL — LOW (ref 32–36)
MCHC RBC-ENTMCNC: 31 PG — SIGNIFICANT CHANGE UP (ref 27–34)
MCV RBC AUTO: 100.3 FL — HIGH (ref 80–100)
MONOCYTES # BLD AUTO: 1.46 K/UL — HIGH (ref 0–0.9)
MONOCYTES NFR BLD AUTO: 11.1 % — SIGNIFICANT CHANGE UP (ref 2–14)
MYELOCYTES NFR BLD: 1.9 % — HIGH (ref 0–0)
NEUTROPHILS # BLD AUTO: 9.49 K/UL — HIGH (ref 1.8–7.4)
NEUTROPHILS NFR BLD AUTO: 72.2 % — SIGNIFICANT CHANGE UP (ref 43–77)
NRBC # BLD: 2 /100 — HIGH (ref 0–0)
PHOSPHATE SERPL-MCNC: 2.6 MG/DL — SIGNIFICANT CHANGE UP (ref 2.5–4.5)
PLAT MORPH BLD: NORMAL — SIGNIFICANT CHANGE UP
PLATELET # BLD AUTO: 409 K/UL — HIGH (ref 150–400)
POLYCHROMASIA BLD QL SMEAR: SLIGHT — SIGNIFICANT CHANGE UP
POTASSIUM SERPL-MCNC: 4 MMOL/L — SIGNIFICANT CHANGE UP (ref 3.5–5.3)
POTASSIUM SERPL-SCNC: 4 MMOL/L — SIGNIFICANT CHANGE UP (ref 3.5–5.3)
PROCALCITONIN SERPL-MCNC: 0.11 NG/ML — HIGH (ref 0.02–0.1)
RBC # BLD: 3.13 M/UL — LOW (ref 3.8–5.2)
RBC # FLD: 14.8 % — HIGH (ref 10.3–14.5)
RBC BLD AUTO: SIGNIFICANT CHANGE UP
SODIUM SERPL-SCNC: 143 MMOL/L — SIGNIFICANT CHANGE UP (ref 135–145)
WBC # BLD: 13.14 K/UL — HIGH (ref 3.8–10.5)
WBC # FLD AUTO: 13.14 K/UL — HIGH (ref 3.8–10.5)

## 2020-02-26 PROCEDURE — 99233 SBSQ HOSP IP/OBS HIGH 50: CPT | Mod: GC

## 2020-02-26 PROCEDURE — 99232 SBSQ HOSP IP/OBS MODERATE 35: CPT

## 2020-02-26 PROCEDURE — 99233 SBSQ HOSP IP/OBS HIGH 50: CPT

## 2020-02-26 PROCEDURE — 99223 1ST HOSP IP/OBS HIGH 75: CPT

## 2020-02-26 PROCEDURE — 71045 X-RAY EXAM CHEST 1 VIEW: CPT | Mod: 26

## 2020-02-26 RX ORDER — HALOPERIDOL DECANOATE 100 MG/ML
5 INJECTION INTRAMUSCULAR EVERY 6 HOURS
Refills: 0 | Status: DISCONTINUED | OUTPATIENT
Start: 2020-02-26 | End: 2020-03-02

## 2020-02-26 RX ORDER — INSULIN GLARGINE 100 [IU]/ML
20 INJECTION, SOLUTION SUBCUTANEOUS EVERY MORNING
Refills: 0 | Status: DISCONTINUED | OUTPATIENT
Start: 2020-02-26 | End: 2020-02-27

## 2020-02-26 RX ORDER — HUMAN INSULIN 100 [IU]/ML
30 INJECTION, SUSPENSION SUBCUTANEOUS
Refills: 0 | Status: DISCONTINUED | OUTPATIENT
Start: 2020-02-26 | End: 2020-02-26

## 2020-02-26 RX ORDER — HUMAN INSULIN 100 [IU]/ML
12 INJECTION, SUSPENSION SUBCUTANEOUS
Refills: 0 | Status: DISCONTINUED | OUTPATIENT
Start: 2020-02-26 | End: 2020-02-26

## 2020-02-26 RX ADMIN — Medication 25 MILLIGRAM(S): at 18:56

## 2020-02-26 RX ADMIN — HUMAN INSULIN 10 UNIT(S): 100 INJECTION, SUSPENSION SUBCUTANEOUS at 00:19

## 2020-02-26 RX ADMIN — HEPARIN SODIUM 5000 UNIT(S): 5000 INJECTION INTRAVENOUS; SUBCUTANEOUS at 05:52

## 2020-02-26 RX ADMIN — FLUCONAZOLE 200 MILLIGRAM(S): 150 TABLET ORAL at 13:18

## 2020-02-26 RX ADMIN — Medication 2: at 05:54

## 2020-02-26 RX ADMIN — Medication 15 MILLILITER(S): at 13:17

## 2020-02-26 RX ADMIN — HUMAN INSULIN 20 UNIT(S): 100 INJECTION, SUSPENSION SUBCUTANEOUS at 05:53

## 2020-02-26 RX ADMIN — Medication 1 MILLIGRAM(S): at 16:54

## 2020-02-26 RX ADMIN — HEPARIN SODIUM 5000 UNIT(S): 5000 INJECTION INTRAVENOUS; SUBCUTANEOUS at 23:16

## 2020-02-26 RX ADMIN — PRIMIDONE 50 MILLIGRAM(S): 250 TABLET ORAL at 23:17

## 2020-02-26 RX ADMIN — Medication 12.5 MILLIGRAM(S): at 05:52

## 2020-02-26 RX ADMIN — PANTOPRAZOLE SODIUM 40 MILLIGRAM(S): 20 TABLET, DELAYED RELEASE ORAL at 13:18

## 2020-02-26 RX ADMIN — Medication 10: at 13:16

## 2020-02-26 RX ADMIN — Medication 4: at 18:08

## 2020-02-26 RX ADMIN — HUMAN INSULIN 12 UNIT(S): 100 INJECTION, SUSPENSION SUBCUTANEOUS at 18:08

## 2020-02-26 RX ADMIN — LOSARTAN POTASSIUM 25 MILLIGRAM(S): 100 TABLET, FILM COATED ORAL at 05:53

## 2020-02-26 RX ADMIN — HUMAN INSULIN 10 UNIT(S): 100 INJECTION, SUSPENSION SUBCUTANEOUS at 13:17

## 2020-02-26 RX ADMIN — Medication 87.5 MICROGRAM(S): at 23:41

## 2020-02-26 RX ADMIN — SIMVASTATIN 20 MILLIGRAM(S): 20 TABLET, FILM COATED ORAL at 23:17

## 2020-02-26 RX ADMIN — Medication 2: at 00:18

## 2020-02-26 RX ADMIN — Medication 100 MILLIGRAM(S): at 13:17

## 2020-02-26 RX ADMIN — HEPARIN SODIUM 5000 UNIT(S): 5000 INJECTION INTRAVENOUS; SUBCUTANEOUS at 16:04

## 2020-02-26 RX ADMIN — Medication 25 MILLIGRAM(S): at 05:53

## 2020-02-26 NOTE — CHART NOTE - NSCHARTNOTEFT_GEN_A_CORE
Follow up     SUBJECTIVE: patient awake, alert, non-verbal, spontaneous movements of extremities     PAST MEDICAL & SURGICAL HISTORY:  Diverticulosis  RA (Rheumatoid Arthritis)  Tooth Abscess  Arthritis  Cigarette Smoker  Chronic Asthma  Adult Hypothyroidism  Borderline Diabetes Mellitus  High Cholesterol  H/O: HTN  Wrist Disorder: S/P Surgery  History of  Section    FAMILY HISTORY:  No pertinent family history in first degree relatives    MEDICATIONS (HOME):  Home Medications:  Advair Diskus 250 mcg-50 mcg inhalation powder: 1 puff(s) inhaled once a day (:46)  albuterol 90 mcg/inh inhalation aerosol: 2 puff(s) inhaled 4 times a day (:46)  gabapentin 400 mg oral capsule: 1 cap(s) orally 3 times a day (:46)  Januvia 50 mg oral tablet: 1 tab(s) orally once a day (:46)  Lantus Solostar Pen 100 units/mL subcutaneous solution: 20 unit(s) subcutaneous once a day (at bedtime) (:46)  levothyroxine 137 mcg (0.137 mg) oral tablet: 1 tab(s) orally once a day (:46)  metoprolol succinate 50 mg oral tablet, extended release: 1 tab(s) orally once a day (:46)  naproxen 250 mg oral tablet: 1 tab(s) orally 2 times a day, As Needed (:46)  NovoLOG FlexPen 100 units/mL injectable solution: 2-8 unit(s) injectable 3 times a day (with meals)  (:46)  pantoprazole 40 mg oral delayed release tablet: 1 tab(s) orally once a day (:46)  predniSONE: 15 milligram(s) orally once a day (:46)  primidone 50 mg oral tablet: 1 tab(s) orally once a day (at bedtime) (:46)  sildenafil 20 mg oral tablet: 1 tab(s) orally 3 times a day (:46)  simvastatin 20 mg oral tablet: 1 tab(s) orally once a day (at bedtime) (:46)  traMADol 300 mg/24 hours oral tablet, extended release: 1 tab(s) orally once a day, As Needed (2020 21:46)    MEDICATIONS  (STANDING):  acetaminophen  IVPB .. 1000 milliGRAM(s) IV Intermittent once  budesonide  80 MICROgram(s)/formoterol 4.5 MICROgram(s) Inhaler 2 Puff(s) Inhalation two times a day  dextrose 5%. 1000 milliLiter(s) (50 mL/Hr) IV Continuous <Continuous>  dextrose 50% Injectable 12.5 Gram(s) IV Push once  dextrose 50% Injectable 25 Gram(s) IV Push once  dextrose 50% Injectable 25 Gram(s) IV Push once  fluconAZOLE   40 mG/mL Suspension 200 milliGRAM(s) Enteral Tube daily  heparin  Injectable 5000 Unit(s) SubCutaneous every 8 hours  influenza   Vaccine 0.5 milliLiter(s) IntraMuscular once  insulin lispro (HumaLOG) corrective regimen sliding scale   SubCutaneous every 6 hours  insulin NPH human recombinant 20 Unit(s) SubCutaneous <User Schedule>  insulin NPH human recombinant 10 Unit(s) SubCutaneous <User Schedule>  levothyroxine Injectable 87.5 MICROGram(s) IV Push at bedtime  losartan 25 milliGRAM(s) Oral daily  methylPREDNISolone sodium succinate Injectable 12.5 milliGRAM(s) IV Push daily  metoprolol tartrate 25 milliGRAM(s) Oral two times a day  multivitamin/minerals/iron Oral Solution (CENTRUM) 15 milliLiter(s) Oral daily  pantoprazole  Injectable 40 milliGRAM(s) IV Push daily  primidone 50 milliGRAM(s) Oral at bedtime  simvastatin 20 milliGRAM(s) Oral at bedtime  thiamine 100 milliGRAM(s) Oral daily    MEDICATIONS  (PRN):  acetaminophen  Suppository .. 650 milliGRAM(s) Rectal every 6 hours PRN Temp greater or equal to 38C (100.4F)  albuterol/ipratropium for Nebulization 3 milliLiter(s) Nebulizer every 6 hours PRN Shortness of Breath and/or Wheezing  dextrose 40% Gel 15 Gram(s) Oral once PRN Blood Glucose LESS THAN 70 milliGRAM(s)/deciLiter  glucagon  Injectable 1 milliGRAM(s) IntraMuscular once PRN Glucose <70 milliGRAM(s)/deciLiter  melatonin 3 milliGRAM(s) Oral at bedtime PRN Insomnia    ALLERGIES/INTOLERANCES:  Allergies  Depakote (Other)    Intolerances  Seroquel (Other)    VITALS & EXAMINATION:  Vital Signs Last 24 Hrs  T(C): 37.1 (2020 13:49), Max: 37.1 (2020 21:30)  T(F): 98.7 (2020 13:49), Max: 98.7 (2020 21:30)  HR: 104 (2020 13:49) (95 - 104)  BP: 151/93 (2020 13:49) (151/83 - 151/93)  BP(mean): --  RR: 20 (2020 13:49) (20 - 20)  SpO2: 97% (2020 13:49) (88% - 97%)    General:  Constitutional: Obese Female, appears stated age, in no apparent distress including pain, NG tube in place  ENT: neck supple  Respiratory: Patent airway.    Cardiovascular (>2): Normal capillary beds refill, 1-2 seconds or less.     Neurological (>12):  MS: Awake, alert, not able to obtain orientation. Guarded. Not cooperative during exam.     Language: non-verbal (on my exam, although nurse stated patient was talking earlier in day)    CNs: PERRLA (R = 3mm, L = 3mm). b/l blink to threat. tracks objects/people in the room with eyes. No facial asymmetry b/l. Gag reflex deferred. Patient uncooperative when asked to examine oral pharynx     Motor: Normal muscle bulk, mild cogwheeling b/l. +tremors b/l (arms > legs). Spontaneous movements of all extremities, not cooperative during strength exam. 	     Sensation: localizes to pain throughout     Reflexes:              Biceps(C5)       BR(C6)               Patellar(L4)      Plantar Resp  R	2	          2	             2		   Down   L	2	          2	             2		   Down     Coordination: uncooperative during exam     Gait: Deferred     LABORATORY:  CBC                       9.7    13.14 )-----------( 409      ( 2020 06:46 )             31.4     Chem -    143  |  103  |  17  ----------------------------<  167<H>  4.0   |  26  |  0.76    Ca    9.7      2020 06:46  Phos  2.6       Mg     2.0         TPro  6.6  /  Alb  3.7  /  TBili  0.1<L>  /  DBili  x   /  AST  12  /  ALT  11  /  AlkPhos  72      LFTs LIVER FUNCTIONS - ( 2020 07:00 )  Alb: 3.7 g/dL / Pro: 6.6 g/dL / ALK PHOS: 72 U/L / ALT: 11 U/L / AST: 12 U/L / GGT: x           Coagulopathy   Lipid Panel  Chol 307<H> <H> HDL 50 Trig 352<H>  A1c  [id number] 9.0   [id number] 9.2    Cardiac enzymes     U/A   CSF  Immunological  Other    STUDIES & IMAGING:  Studies (EKG, EEG, EMG, etc):       Clinical Impression:  Mild diffuse or multifocal cerebral dysfunction, not specific as to etiology.  There were no epileptiform abnormalities recorded.      Radiology (XR, CT, MR, U/S, TTE/MATEO):    Assessment:   66 y/o woman hx of  DM, hypothyroidism, COPD, RA on steroids, Essential tremor, SBO s/p ex lap with lysis of adhesions (4 retention sutures) c/b eviscerated 2/2 a coughing episode s/p ex lap (6 retention sutures) c/b multifocal pneumonia, developed hypercapnic respiratory failure requiring intubation 19, s/p bronch 19, and extubated on 19, course further c/b Enterococci bacteremia prior to current admission, p/w 3 weeks on psychosis and delusional thinking prior to admission. Pt had LP with unremarkable CSF . NMDA negative. Encephalitis panel negative. She has a small right holo cephalic SDH. CTA was negative for vessel occlusion, cerebral venous drainage also normal, no thrombosis.    Impression: Suspicious for catatonia, non-focal exam otherwise. Review of labs still show metabolic derangements, ID on board for workup regarding UTI. Ddx remains the same, including fronto-temporal dementia vs. delirium vs. metabolic (stated in previous note). All other neurologic workup including LP (AIE panels, CSF studies), imaging and EEG otherwise unremarkable.     Plan:  -Consider trial of ativan for catatonia   -Can consider amantadine 100mg daily if son is agreeable  -c/w ID workup and evaluation

## 2020-02-26 NOTE — PROGRESS NOTE ADULT - PROBLEM SELECTOR PLAN 2
SIRS criteria on 2/14 w/ Tmax 100.5 and tachycardia. UA w/ post nitrites, LE, many bacteria. UCx E.Coli pansensitive s/p x 4 days zosyn (2/14-2/18)  -positive UA 2/20 zosyn x 3 days, however urine cx positive for candida only therefore zosyn d/c  - c/w fluconazole for 5-7 days per ID but unlikely contributing to AMS SIRS criteria on 2/14 w/ Tmax 100.5 and tachycardia. UA w/ post nitrites, LE, many bacteria. UCx E.Coli pansensitive s/p x 4 days zosyn (2/14-2/18)  -positive UA 2/20 zosyn x 3 days, however urine cx positive for candida only therefore zosyn d/c  - c/w fluconazole for 5-7 days per ID but unlikely contributing to AMS  -RVP negative  -can consider repeat chest CT per ID SIRS criteria on 2/14 w/ Tmax 100.5 and tachycardia. UA w/ post nitrites, LE, many bacteria. UCx E.Coli pansensitive s/p x 4 days zosyn (2/14-2/18)  -positive UA 2/20 zosyn x 3 days, however urine cx positive for candida only therefore zosyn d/c  - c/w fluconazole for 5-7 days per ID but unlikely contributing to AMS  -RVP negative, procal borderline negative

## 2020-02-26 NOTE — PROGRESS NOTE ADULT - PROBLEM SELECTOR PLAN 5
-Continuous tube feeds started via NG tube 2/20  -moderate ISS   -fingersticks q6hrs  -NPH regimen 14 units @ 6am, NPH 6 units @ (12am, 12pm, 6pm)  -c/w moderate ISS, endocrine adjusting -Continuous tube feeds started via NG tube 2/20  -moderate ISS   -fingersticks q6hrs  -NPH regimen 20 units @ 6am, NPH 10 units @ (12am, 12pm, 6pm)  -c/w moderate ISS, endocrine adjusting

## 2020-02-26 NOTE — CONSULT NOTE ADULT - SUBJECTIVE AND OBJECTIVE BOX
HPI:  64f hx functional paraplegia bedbound, hypothyroidism, pulmonary HTN, DM on insulin, COPD/CHRIS on noctournal CPAP and 3L home), RA on chronic prednisone, chronic tremors, SBO s/p ex lap with lysis of adhesions (4 retention sutures) c/b eviscerated 2/2 a coughing episode s/p ex lap (6 retention sutures) c/b multifocal pneumonia, developed hypercapnic respiratory failure requiring intubation 19, s/p bronch 19, and extubated on 19, course further c/b Enterococci bacteremia.    Patient unreliable historian, history obtained from Son Norris(lives w/ patient) and Daughter who claim to be HCP. Patient's behavior was first noted to change 3weeks ago. She was accusatory of family for 'stealing $180k from her closet", repeatedly calling back restaurants of which she ordered from claiming she never received deliver when she did, also called police on multiple occasions claiming son was trying to hurt her as well as accusing him of "stealing her oxygen". Patient has fired multiple HHAs claiming they were not useful for her needs. patient's son has also noticed she would drink about "1-2 gallons" of water a day however did not notice change in urination habits. Also c/o constipation, of which son had given OTC meds to help, patient had 1 "baseball size, hard stool" after straining w/o blood described as brown in color. Recently has been spitting at HHAs and caretaker son, throwing her bedpan filled with urine and feces at them.     In the ER patient agitated requiring zyprexa total 20mg, haloperidol 10, versed 8mg    Patient was taken to [facility] for evaluation; was evaluated by psych and deemed to have capacity to leave AMA. Patient w/ elevated WBC and SAM; also later blood culture x1 bottle significant for gram Positive rods was sent to labatory outside hospital system for speciation. (2020 11:56)    Course has been complicated by concern for myxedama coma as TSH was 101, being treated with IV Levothyroxine. The patient's MRI demonstrated subacute right sided SDH. Course has also been complicated by concern for infectious etiology given hx of SIRS, being treated with Fluconazole as UCx positive for Candida. Palliative consulted for GOC as patient with AMS and on NGT feeds.     PERTINENT PMHx  Diverticulosis  Cellulitis  CVA (Cerebral Vascular Accident)  RA (Rheumatoid Arthritis)  Tooth Abscess  Arthritis  Cigarette Smoker  Chronic Asthma  Adult Hypothyroidism  Borderline Diabetes Mellitus  High Cholesterol  H/O: HTN  Wrist Disorder    PSHx  History of  Section    FAMILY HISTORY:  No pertinent family history in first degree relatives    ITEMS NOT CHECKED ARE NOT PRESENT    SOCIAL HISTORY:   Significant other/partner[ ]  Children[ ]  Christian/Spirituality:  Substance hx:  [ ]   Tobacco hx:  [ ]   Alcohol hx: [ ]   Home Opioid hx:  [ ] I-Stop Reference No:  Living Situation: [ ]Home  [ ]Long term care  [ ]Rehab [ ]Other    ADVANCE DIRECTIVES:    DNR  MOLST  [ ]  Living Will  [ ]   DECISION MAKER(s):  [ ] Health Care Proxy(s)  [ ] Surrogate(s)  [ ] Guardian           Name(s): Phone Number(s):    BASELINE (I)ADL(s) (prior to admission):  Accident: [X]Total  [ ] Moderate [ ]Dependent    Allergies    Depakote (Other)    Intolerances    Seroquel (Other)  MEDICATIONS  (STANDING):  acetaminophen  IVPB .. 1000 milliGRAM(s) IV Intermittent once  budesonide  80 MICROgram(s)/formoterol 4.5 MICROgram(s) Inhaler 2 Puff(s) Inhalation two times a day  dextrose 5%. 1000 milliLiter(s) (50 mL/Hr) IV Continuous <Continuous>  dextrose 50% Injectable 12.5 Gram(s) IV Push once  dextrose 50% Injectable 25 Gram(s) IV Push once  dextrose 50% Injectable 25 Gram(s) IV Push once  fluconAZOLE   40 mG/mL Suspension 200 milliGRAM(s) Enteral Tube daily  heparin  Injectable 5000 Unit(s) SubCutaneous every 8 hours  influenza   Vaccine 0.5 milliLiter(s) IntraMuscular once  insulin lispro (HumaLOG) corrective regimen sliding scale   SubCutaneous every 6 hours  insulin NPH human recombinant 20 Unit(s) SubCutaneous <User Schedule>  insulin NPH human recombinant 10 Unit(s) SubCutaneous <User Schedule>  levothyroxine Injectable 87.5 MICROGram(s) IV Push at bedtime  losartan 25 milliGRAM(s) Oral daily  methylPREDNISolone sodium succinate Injectable 12.5 milliGRAM(s) IV Push daily  metoprolol tartrate 25 milliGRAM(s) Oral two times a day  multivitamin/minerals/iron Oral Solution (CENTRUM) 15 milliLiter(s) Oral daily  pantoprazole  Injectable 40 milliGRAM(s) IV Push daily  primidone 50 milliGRAM(s) Oral at bedtime  simvastatin 20 milliGRAM(s) Oral at bedtime  thiamine 100 milliGRAM(s) Oral daily    MEDICATIONS  (PRN):  acetaminophen  Suppository .. 650 milliGRAM(s) Rectal every 6 hours PRN Temp greater or equal to 38C (100.4F)  albuterol/ipratropium for Nebulization 3 milliLiter(s) Nebulizer every 6 hours PRN Shortness of Breath and/or Wheezing  dextrose 40% Gel 15 Gram(s) Oral once PRN Blood Glucose LESS THAN 70 milliGRAM(s)/deciLiter  glucagon  Injectable 1 milliGRAM(s) IntraMuscular once PRN Glucose <70 milliGRAM(s)/deciLiter  melatonin 3 milliGRAM(s) Oral at bedtime PRN Insomnia    PRESENT SYMPTOMS: [X]Unable to obtain due to poor mentation   Source if other than patient:  [ ]Family   [ ]Team     Pain: [ ]yes [ ]no  QOL impact -   Location -                    Aggravating factors -  Quality -  Radiation -  Timing-  Severity (0-10 scale):  Minimal acceptable level (0-10 scale):     PAIN AD Score:     [email or web address] (press ctrl +  left click to view)    Dyspnea:                           [ ]Mild [ ]Moderate [ ]Severe  Anxiety:                             [ ]Mild [ ]Moderate [ ]Severe  Fatigue:                             [ ]Mild [ ]Moderate [ ]Severe  Nausea:                             [ ]Mild [ ]Moderate [ ]Severe  Loss of appetite:              [ ]Mild [ ]Moderate [ ]Severe  Constipation:                    [ ]Mild [ ]Moderate [ ]Severe    Other Symptoms:  [ ]All other review of systems negative     Palliative Performance Status Version 2:         %    http://npcrc.org/files/news/palliative_performance_scale_[email or web address].pdf  PHYSICAL EXAM:  Vital Signs Last 24 Hrs  T(C): 36.9 (2020 05:49), Max: 37.1 (2020 21:30)  T(F): 98.4 (2020 05:49), Max: 98.7 (2020 21:30)  HR: 102 (2020 05:49) (95 - 102)  BP: 151/85 (2020 05:49) (141/100 - 151/85)  BP(mean): --  RR: 20 (2020 05:49) (18 - 20)  SpO2: 88% (2020 05:49) (88% - 96%) I&O's Summary    2020 07:01  -  2020 07:00  --------------------------------------------------------  IN: 920 mL / OUT: 0 mL / NET: 920 mL      GENERAL:  [ ]Alert  [ ]Oriented x   [ ]Lethargic  [ ]Cachexia  [ ]Unarousable  [ ]Verbal  [ ]Non-Verbal  Behavioral:   [ ] Anxiety  [ ] Delirium [ ] Agitation [ ] Other  HEENT:  [ ]Normal   [ ]Dry mouth   [ ]ET Tube/Trach  [ ]Oral lesions  PULMONARY:   [ ]Clear [ ]Tachypnea  [ ]Audible excessive secretions   [ ]Rhonchi        [ ]Right [ ]Left [ ]Bilateral  [ ]Crackles        [ ]Right [ ]Left [ ]Bilateral  [ ]Wheezing     [ ]Right [ ]Left [ ]Bilatera  [ ]Diminished breath sounds [ ]right [ ]left [ ]bilateral  CARDIOVASCULAR:    [ ]Regular [ ]Irregular [ ]Tachy  [ ]Ubaldo [ ]Murmur [ ]Other  GASTROINTESTINAL:  [ ]Soft  [ ]Distended   [ ]+BS  [ ]Non tender [ ]Tender  [ ]PEG [ ]OGT/ NGT  Last BM:     GENITOURINARY:  [ ]Normal [ ] Incontinent   [ ]Oliguria/Anuria   [ ]Antoine  MUSCULOSKELETAL:   [ ]Normal   [ ]Weakness  [ ]Bed/Wheelchair bound [ ]Edema  NEUROLOGIC:   [ ]No focal deficits  [ ]Cognitive impairment  [ ]Dysphagia [ ]Dysarthria [ ]Paresis [ ]Other   SKIN:   [ ]Normal    [ ]Rash  [ ]Pressure ulcer(s)       Present on admission [ ]y [ ]n    CRITICAL CARE:  [ ] Shock Present  [ ]Septic [ ]Cardiogenic [ ]Neurologic [ ]Hypovolemic  [ ]  Vasopressors [ ]  Inotropes   [ ]Respiratory failure present [ ]Mechanical ventilation [ ]Non-invasive ventilatory support [ ]High flow  [ ]Acute  [ ]Chronic [ ]Hypoxic  [ ]Hypercarbic [ ]Other  [ ]Other organ failure     LABS:                        9.7    13.14 )-----------( 409      ( 2020 06:46 )             31.4       143  |  103  |  17  ----------------------------<  167<H>  4.0   |  26  |  0.76    Ca    9.7      2020 06:46  Phos  2.6       Mg     2.0         TPro  6.6  /  Alb  3.7  /  TBili  0.1<L>  /  DBili  x   /  AST  12  /  ALT  11  /  AlkPhos  72          RADIOLOGY & ADDITIONAL STUDIES:    PROTEIN CALORIE MALNUTRITION PRESENT: [ ]mild [ ]moderate [ ]severe [ ]underweight [ ]morbid obesity  https://www.andeal.org/vault/2440/web/files/ONC/Table_Clinical%20Characteristics%20to%20Document%20Malnutrition-White%20JV%20et%20al%837822.pdf    Height (cm): 157.48 (20 @ 11:54), 162.56 (20 @ 13:54), 154.94 (19 @ 23:50)  Weight (kg): 94.3 (20 @ 11:54), 113.4 (20 @ 13:54), 93.3 (19 @ 23:50)  BMI (kg/m2): 38 (20 @ 11:54), 42.9 (20 @ 13:54), 38.9 (19 @ 23:50)    [ ]PPSV2 < or = to 30% [ ]significant weight loss  [ ]poor nutritional intake  [ ]anasarca     Albumin, Serum: 3.7 g/dL (20 @ 07:00)   [ ]Artificial Nutrition      REFERRALS:   [ ]Chaplaincy  [ ]Hospice  [ ]Child Life  [ ]Social Work  [ ]Case management [ ]Holistic Therapy     Goals of Care Document: G. Lisker (19 @ 13:25)  Goals of Care Conversation:   Advance Directives:  · Does patient have Advance Directive  No  · Do you want to complete the HCP and name a Luis Enrique Care Agent  Yes  · Name of person who assisted  Pulmonary Attending    Conversation Discussion:  · Conversation Details  I again had a conversation with Ms. Morrow regarding tracheostomy if she were to decompensate.  This time, patient's son, Norris, was in the room and participating in the conversation.  Monique Chino NP, present as well.  Ms Morrow again clearly stated that she would not want to live with a tracheostomy, she would never want to live if unable to speak or eat or have to live in a nursing home.  After hearing his mother's words, Norris stated that he now has fully understood his mother's wishes and accepts the DNR.    Ms. Mrorow also expressed that she does want her medical information shared with both her daughter, Heavenly, and her son, Norris    Electronic Signatures:  Lisker, Gita N (MD)  (Signed 14-Mar-2019 13:29)  	Authored: Goals of Care Conversation      Last Updated: 14-Mar-2019 13:29 by Lisker, Gita N (MD) HPI:  64f hx functional paraplegia bedbound, hypothyroidism, pulmonary HTN, DM on insulin, COPD/CHRIS on noctournal CPAP and 3L home), RA on chronic prednisone, chronic tremors, SBO s/p ex lap with lysis of adhesions (4 retention sutures) c/b eviscerated 2/2 a coughing episode s/p ex lap (6 retention sutures) c/b multifocal pneumonia, developed hypercapnic respiratory failure requiring intubation 19, s/p bronch 19, and extubated on 19, course further c/b Enterococci bacteremia.    Patient unreliable historian, history obtained from Son Norris(lives w/ patient) and Daughter who claim to be HCP. Patient's behavior was first noted to change 3weeks ago. She was accusatory of family for 'stealing $180k from her closet", repeatedly calling back restaurants of which she ordered from claiming she never received deliver when she did, also called police on multiple occasions claiming son was trying to hurt her as well as accusing him of "stealing her oxygen". Patient has fired multiple HHAs claiming they were not useful for her needs. patient's son has also noticed she would drink about "1-2 gallons" of water a day however did not notice change in urination habits. Also c/o constipation, of which son had given OTC meds to help, patient had 1 "baseball size, hard stool" after straining w/o blood described as brown in color. Recently has been spitting at HHAs and caretaker son, throwing her bedpan filled with urine and feces at them.     In the ER patient agitated requiring zyprexa total 20mg, haloperidol 10, versed 8mg    Patient was taken to [facility] for evaluation; was evaluated by psych and deemed to have capacity to leave AMA. Patient w/ elevated WBC and SAM; also later blood culture x1 bottle significant for gram Positive rods was sent to labatory outside hospital system for speciation. (2020 11:56)    Course has been complicated by concern for myxedama coma as TSH was 101, being treated with IV Levothyroxine. The patient's MRI demonstrated subacute right sided SDH. Course has also been complicated by concern for infectious etiology given hx of SIRS, being treated with Fluconazole as UCx positive for Candida. Palliative consulted for GOC as patient with AMS and on NGT feeds.     PERTINENT PMHx  Diverticulosis  Cellulitis  CVA (Cerebral Vascular Accident)  RA (Rheumatoid Arthritis)  Tooth Abscess  Arthritis  Cigarette Smoker  Chronic Asthma  Adult Hypothyroidism  Borderline Diabetes Mellitus  High Cholesterol  H/O: HTN  Wrist Disorder    PSHx  History of  Section    FAMILY HISTORY:  No pertinent family history in first degree relatives    ITEMS NOT CHECKED ARE NOT PRESENT    SOCIAL HISTORY:   Significant other/partner[ ]  Children[ ]  Synagogue/Spirituality:  Substance hx:  [ ]   Tobacco hx:  [ ]   Alcohol hx: [ ]   Home Opioid hx:  [ ] I-Stop Reference No:  Living Situation: [ ]Home  [ ]Long term care  [ ]Rehab [ ]Other    ADVANCE DIRECTIVES:    DNR  MOLST  [ ]  Living Will  [X]   DECISION MAKER(s):  [ ] Health Care Proxy(s)  [X] Surrogate(s): 2 children Heavenly Hernández (daughter) and Norris Rush (son) [ ] Guardian           Name(s): Phone Number(s):    BASELINE (I)ADL(s) (prior to admission):  [place]: [X]Total  [ ] Moderate [ ]Dependent    Allergies    Depakote (Other)    Intolerances    Seroquel (Other)  MEDICATIONS  (STANDING):  acetaminophen  IVPB .. 1000 milliGRAM(s) IV Intermittent once  budesonide  80 MICROgram(s)/formoterol 4.5 MICROgram(s) Inhaler 2 Puff(s) Inhalation two times a day  dextrose 5%. 1000 milliLiter(s) (50 mL/Hr) IV Continuous <Continuous>  dextrose 50% Injectable 12.5 Gram(s) IV Push once  dextrose 50% Injectable 25 Gram(s) IV Push once  dextrose 50% Injectable 25 Gram(s) IV Push once  fluconAZOLE   40 mG/mL Suspension 200 milliGRAM(s) Enteral Tube daily  heparin  Injectable 5000 Unit(s) SubCutaneous every 8 hours  influenza   Vaccine 0.5 milliLiter(s) IntraMuscular once  insulin lispro (HumaLOG) corrective regimen sliding scale   SubCutaneous every 6 hours  insulin NPH human recombinant 20 Unit(s) SubCutaneous <User Schedule>  insulin NPH human recombinant 10 Unit(s) SubCutaneous <User Schedule>  levothyroxine Injectable 87.5 MICROGram(s) IV Push at bedtime  losartan 25 milliGRAM(s) Oral daily  methylPREDNISolone sodium succinate Injectable 12.5 milliGRAM(s) IV Push daily  metoprolol tartrate 25 milliGRAM(s) Oral two times a day  multivitamin/minerals/iron Oral Solution (CENTRUM) 15 milliLiter(s) Oral daily  pantoprazole  Injectable 40 milliGRAM(s) IV Push daily  primidone 50 milliGRAM(s) Oral at bedtime  simvastatin 20 milliGRAM(s) Oral at bedtime  thiamine 100 milliGRAM(s) Oral daily    MEDICATIONS  (PRN):  acetaminophen  Suppository .. 650 milliGRAM(s) Rectal every 6 hours PRN Temp greater or equal to 38C (100.4F)  albuterol/ipratropium for Nebulization 3 milliLiter(s) Nebulizer every 6 hours PRN Shortness of Breath and/or Wheezing  dextrose 40% Gel 15 Gram(s) Oral once PRN Blood Glucose LESS THAN 70 milliGRAM(s)/deciLiter  glucagon  Injectable 1 milliGRAM(s) IntraMuscular once PRN Glucose <70 milliGRAM(s)/deciLiter  melatonin 3 milliGRAM(s) Oral at bedtime PRN Insomnia    PRESENT SYMPTOMS:ROS [X]Unable to obtain due to poor mentation   Source if other than patient:  [ ]Family   [ ]Team       Palliative Performance Status Version 2:         100% prior to hospitalization   [email or web address]/files/news/palliative_performance_scale_[email or web address].pdf    PHYSICAL EXAM:  Vital Signs Last 24 Hrs  T(C): 36.9 (2020 05:49), Max: 37.1 (2020 21:30)  T(F): 98.4 (2020 05:49), Max: 98.7 (2020 21:30)  HR: 102 (2020 05:49) (95 - 102)  BP: 151/85 (2020 05:49) (141/100 - 151/85)  BP(mean): --  RR: 20 (2020 05:49) (18 - 20)  SpO2: 88% (2020 05:49) (88% - 96%) I&O's Summary    2020 07:01  -  2020 07:00  --------------------------------------------------------  IN: 920 mL / OUT: 0 mL / NET: 920 mL      GENERAL:  [ ]Alert  [ ]Oriented  [ ]Lethargic  [ ]Cachexia  [ ]Unarousable  [ ]Verbal  [X]Non-Verbal  Behavioral:   [ ] Anxiety  [ ] Delirium [ ] Agitation [ ] Other  HEENT:  [ ]Normal   [ ]Dry mouth   [ ]ET Tube/Trach  [ ]Oral lesions  PULMONARY:   [ ]Clear [ ]Tachypnea  [ ]Audible excessive secretions   [ ]Rhonchi        [ ]Right [ ]Left [ ]Bilateral  [ ]Crackles        [ ]Right [ ]Left [ ]Bilateral  [ ]Wheezing     [ ]Right [ ]Left [ ]Bilatera  [ ]Diminished breath sounds [ ]right [ ]left [ ]bilateral  CARDIOVASCULAR:    [ ]Regular [ ]Irregular [ ]Tachy  [ ]Ubaldo [ ]Murmur [ ]Other  GASTROINTESTINAL:  [ ]Soft  [ ]Distended   [ ]+BS  [ ]Non tender [ ]Tender  [ ]PEG [ ]OGT/ NGT  Last BM:     GENITOURINARY:  [ ]Normal [ ] Incontinent   [ ]Oliguria/Anuria   [ ]Antoine  MUSCULOSKELETAL:   [ ]Normal   [ ]Weakness  [ ]Bed/Wheelchair bound [ ]Edema  NEUROLOGIC:   [ ]No focal deficits  [ ]Cognitive impairment  [ ]Dysphagia [ ]Dysarthria [ ]Paresis [ ]Other: unresponsive to commands, name call; baseline tremor present  SKIN:   [ ]Normal    [ ]Rash  [ ]Pressure ulcer(s)       Present on admission [ ]y [ ]n      LABS:                        9.7    13.14 )-----------( 409      ( 2020 06:46 )             31.4       143  |  103  |  17  ----------------------------<  167<H>  4.0   |  26  |  0.76    Ca    9.7      2020 06:46  Phos  2.6       Mg     2.0         TPro  6.6  /  Alb  3.7  /  TBili  0.1<L>  /  DBili  x   /  AST  12  /  ALT  11  /  AlkPhos  72   HPI:  64f hx functional paraplegia bedbound, hypothyroidism, pulmonary HTN, DM on insulin, COPD/CHRIS on noctournal CPAP and 3L home), RA on chronic prednisone, chronic tremors, SBO s/p ex lap with lysis of adhesions (4 retention sutures) c/b eviscerated 2/2 a coughing episode s/p ex lap (6 retention sutures) c/b multifocal pneumonia, developed hypercapnic respiratory failure requiring intubation 19, s/p bronch 19, and extubated on 19, course further c/b Enterococci bacteremia.    Patient unreliable historian, history obtained from Son Norris(lives w/ patient) and Daughter who claim to be HCP. Patient's behavior was first noted to change 3weeks ago. She was accusatory of family for 'stealing $180k from her closet", repeatedly calling back restaurants of which she ordered from claiming she never received deliver when she did, also called police on multiple occasions claiming son was trying to hurt her as well as accusing him of "stealing her oxygen". Patient has fired multiple HHAs claiming they were not useful for her needs. patient's son has also noticed she would drink about "1-2 gallons" of water a day however did not notice change in urination habits. Also c/o constipation, of which son had given OTC meds to help, patient had 1 "baseball size, hard stool" after straining w/o blood described as brown in color. Recently has been spitting at HHAs and caretaker son, throwing her bedpan filled with urine and feces at them.     In the ER patient agitated requiring zyprexa total 20mg, haloperidol 10, versed 8mg    Patient was taken to [facility] for evaluation; was evaluated by psych and deemed to have capacity to leave AMA. Patient w/ elevated WBC and SAM; also later blood culture x1 bottle significant for gram Positive rods was sent to labatory outside hospital system for speciation. (2020 11:56)    Course has been complicated by concern for myxedama coma as TSH was 101, being treated with IV Levothyroxine. The patient's MRI demonstrated subacute right sided SDH. Course has also been complicated by concern for infectious etiology given hx of SIRS, being treated with Fluconazole as UCx positive for Candida. Palliative consulted for GOC as patient with AMS and on NGT feeds.     PERTINENT PMHx  Diverticulosis  Cellulitis  CVA (Cerebral Vascular Accident)  RA (Rheumatoid Arthritis)  Tooth Abscess  Arthritis  Cigarette Smoker  Chronic Asthma  Adult Hypothyroidism  Borderline Diabetes Mellitus  High Cholesterol  H/O: HTN  Wrist Disorder    PSHx  History of  Section    FAMILY HISTORY:  No pertinent family history in first degree relatives    ITEMS NOT CHECKED ARE NOT PRESENT    SOCIAL HISTORY:   Significant other/partner[ ]  Children[ X]  Anglican/Spirituality: Adventist  Substance hx:  [ ]   Tobacco hx:  [ ]   Alcohol hx: [ ]   Home Opioid hx:  [ ] I-Stop Reference No: [id number] with tramadol  Living Situation: [X ]Home  [ ]Long term care  [ ]Rehab [ ]Other    ADVANCE DIRECTIVES:    DNR  MOLST  [ ]  Living Will  [X]   DECISION MAKER(s):  [ ] Health Care Proxy(s)  [X] Surrogate(s): 2 children Heavenly Hernández (daughter) and Norris Rush (son) [ ] Guardian           Name(s): Phone Number(s):    BASELINE (I)ADL(s) (prior to admission):  [place]: [X]Total  [ ] Moderate [ ]Dependent    Allergies    Depakote (Other)    Intolerances    Seroquel (Other)    MEDICATIONS  (STANDING):  acetaminophen  IVPB .. 1000 milliGRAM(s) IV Intermittent once  budesonide  80 MICROgram(s)/formoterol 4.5 MICROgram(s) Inhaler 2 Puff(s) Inhalation two times a day  dextrose 5%. 1000 milliLiter(s) (50 mL/Hr) IV Continuous <Continuous>  dextrose 50% Injectable 12.5 Gram(s) IV Push once  dextrose 50% Injectable 25 Gram(s) IV Push once  dextrose 50% Injectable 25 Gram(s) IV Push once  fluconAZOLE   40 mG/mL Suspension 200 milliGRAM(s) Enteral Tube daily  heparin  Injectable 5000 Unit(s) SubCutaneous every 8 hours  influenza   Vaccine 0.5 milliLiter(s) IntraMuscular once  insulin lispro (HumaLOG) corrective regimen sliding scale   SubCutaneous every 6 hours  insulin NPH human recombinant 20 Unit(s) SubCutaneous <User Schedule>  insulin NPH human recombinant 10 Unit(s) SubCutaneous <User Schedule>  levothyroxine Injectable 87.5 MICROGram(s) IV Push at bedtime  losartan 25 milliGRAM(s) Oral daily  methylPREDNISolone sodium succinate Injectable 12.5 milliGRAM(s) IV Push daily  metoprolol tartrate 25 milliGRAM(s) Oral two times a day  multivitamin/minerals/iron Oral Solution (CENTRUM) 15 milliLiter(s) Oral daily  pantoprazole  Injectable 40 milliGRAM(s) IV Push daily  primidone 50 milliGRAM(s) Oral at bedtime  simvastatin 20 milliGRAM(s) Oral at bedtime  thiamine 100 milliGRAM(s) Oral daily    MEDICATIONS  (PRN):  acetaminophen  Suppository .. 650 milliGRAM(s) Rectal every 6 hours PRN Temp greater or equal to 38C (100.4F)  albuterol/ipratropium for Nebulization 3 milliLiter(s) Nebulizer every 6 hours PRN Shortness of Breath and/or Wheezing  dextrose 40% Gel 15 Gram(s) Oral once PRN Blood Glucose LESS THAN 70 milliGRAM(s)/deciLiter  glucagon  Injectable 1 milliGRAM(s) IntraMuscular once PRN Glucose <70 milliGRAM(s)/deciLiter  melatonin 3 milliGRAM(s) Oral at bedtime PRN Insomnia    PRESENT SYMPTOMS:ROS [X]Unable to obtain due to poor mentation   Source if other than patient:  [ ]Family   [ ]Team       Palliative Performance Status Version 2:         100% prior to hospitalization   http://[email or web address]/files/news/palliative_performance_scale_[email or web address].pdf    PHYSICAL EXAM:  Vital Signs Last 24 Hrs  T(C): 36.9 (2020 05:49), Max: 37.1 (2020 21:30)  T(F): 98.4 (2020 05:49), Max: 98.7 (2020 21:30)  HR: 102 (2020 05:49) (95 - 102)  BP: 151/85 (2020 05:49) (141/100 - 151/85)  BP(mean): --  RR: 20 (2020 05:49) (18 - 20)  SpO2: 88% (2020 05:49) (88% - 96%) I&O's Summary    2020 07:01  -  2020 07:00  --------------------------------------------------------  IN: 920 mL / OUT: 0 mL / NET: 920 mL      GENERAL:  [X]Alert  [ ]Oriented  [ ]Lethargic  [ ]Cachexia  [ ]Unarousable  [ ]Verbal  [X]Non-Verbal  Behavioral:   [ ] Anxiety  [ ] Delirium [ ] Agitation [ ] Other  HEENT:  [ ]Normal   [ ]Dry mouth   [ ]ET Tube/Trach  [ ]Oral lesions  PULMONARY:   [X]Clear [ ]Tachypnea  [ ]Audible excessive secretions   [ ]Rhonchi        [ ]Right [ ]Left [ ]Bilateral  [ ]Crackles        [ ]Right [ ]Left [ ]Bilateral  [ ]Wheezing     [ ]Right [ ]Left [ ]Bilatera  [ ]Diminished breath sounds [ ]right [ ]left [ ]bilateral  CARDIOVASCULAR:    [X]Regular [ ]Irregular [ ]Tachy  [ ]Ubaldo [ ]Murmur [ ]Other  GASTROINTESTINAL:  [X]Soft  [ ]Distended   [X]+BS  [X]Non tender [ ]Tender  [ ]PEG [ ]OGT/ NGT  Last BM: 20  GENITOURINARY:  [ ]Normal [ ] Incontinent   [ ]Oliguria/Anuria   [ ]Antoine  MUSCULOSKELETAL:   [ ]Normal   [ ]Weakness  [ ]Bed/Wheelchair bound [ ]Edema  NEUROLOGIC:   [ ]No focal deficits  [X ]Cognitive impairment  [ ]Dysphagia [ ]Dysarthria [ ]Paresis [ ]Other: unresponsive to commands, name call; baseline tremor present  SKIN:   [ ]Normal    [ ]Rash  [ ]Pressure ulcer(s)       Present on admission [ ]y [ ]n      LABS: reviewed                        9.7    13.14 )-----------( 409      ( 2020 06:46 )             31.4       143  |  103  |  17  ----------------------------<  167<H>  4.0   |  26  |  0.76    Ca    9.7      2020 06:46  Phos  2.6       Mg     2.0         TPro  6.6  /  Alb  3.7  /  TBili  0.1<L>  /  DBili  x   /  AST  12  /  ALT  11  /  AlkPhos  72

## 2020-02-26 NOTE — PROGRESS NOTE ADULT - PROBLEM SELECTOR PLAN 10
- DVT ppx: HSQ q8hrs   - Diet: continuous NG feeds, may need PEG, pallative consult to assist with GOC/dispo  - Dispo: [facility]   Transitions of Care Status:  1.  Name of PCP:  Kristie Nails MD (PCP)  2.  PCP Contacted on Admission: [ ] Y    [x] N    3.  PCP contacted at Discharge: [ ] Y    [ ] N    [ ] N/A  4.  Post-Discharge Appointment Date and Location:  5.  Summary of Handoff given to PCP: - DVT ppx: HSQ q8hrs   - Diet: continuous NG feeds, may need PEG, palliative onboard for GOC  - Dispo: [facility]   Transitions of Care Status:  1.  Name of PCP:  Kristie Nails MD (PCP)  2.  PCP Contacted on Admission: [ ] Y    [x] N    3.  PCP contacted at Discharge: [ ] Y    [ ] N    [ ] N/A  4.  Post-Discharge Appointment Date and Location:  5.  Summary of Handoff given to PCP:

## 2020-02-26 NOTE — PROGRESS NOTE BEHAVIORAL HEALTH - NSBHCONSULTRECOMMENDOTHER_PSY_A_CORE FT
1) pt appears to have responded to ativan 1 mg IV x 1 challenge, more verbal and alert. Consider standing IV ativan 1 mg q8am, 1 mg q12 pm and 1 mg q3pm for now. Will f/u to address further dosing changes, such as to PO conversion    2) son Norris made aware. he gives consent for above ativan recommendation

## 2020-02-26 NOTE — PROGRESS NOTE ADULT - PROBLEM SELECTOR PLAN 1
-test BG Q6h  Change NPH regimen to 30 units @ 6am and 12 units @ (12am, 12pm, 6pm) for now. Please hold NPH if tube feeds off  -c/w Humalog moderate correction scale Q6h  -discharge plan: TBD based on nutrition plan  -f/u outpt w/Dr Saul  --Plan discussed with pt/team.  Contact info: [phone] (24/7). pager [phone]

## 2020-02-26 NOTE — PROGRESS NOTE ADULT - ASSESSMENT
63 y/o F w/h/o uncontrolled T2DM with unknown complications. Also h/o functional paraplegia, hypothyroidism, pulmonary hypertension, COPD, RA on chronic prednisone, SBO s/p ex lap with lysis of adhesions c/b multifocal pneumonia with AMS of unknown etiology. Now on continuous tube feeds w glycemic control not at goal while on present insulin doses. Noted BG spikes the most at 12noon every day due to IV steroid effect. Pt getting higher dose of NPH at the time she gets steroids but likley need more insulin at this time. Will continue to titrate NPH to BG goal 100s to 180s without hypoglycemia.

## 2020-02-26 NOTE — CONSULT NOTE ADULT - ASSESSMENT
64f hx functional paraplegia bedbound, hypothyroidism, pulmonary HTN, DM on insulin, COPD/CHRIS on noctournal CPAP and 3L home), RA on chronic prednisone, chronic tremors, recent hospitalization for SBO with course c/b ex-lap, respiratory failure requiring intubation, and Enterococci bacteremia presenting admitted for AMS. Palliative consulted for GOC. 64f hx functional paraplegia bedbound, hypothyroidism, pulmonary HTN, DM on insulin, COPD/CHRIS on noctournal CPAP and 3L home), RA on chronic prednisone, chronic tremors, recent hospitalization for SBO with course c/b ex-lap, respiratory failure requiring intubation, and Enterococci bacteremia admitted for AMS, found to be severely hypothyroid with Candida UTI. Palliative consulted for GOC.

## 2020-02-26 NOTE — PROGRESS NOTE ADULT - SUBJECTIVE AND OBJECTIVE BOX
***************************************************************  Logan Bryan, PGY1  Internal Medicine   pager: [phone]  ***************************************************************    AFTAB BASS  64y  MRN: [id number]    Patient is a 64y old  Female who presents with a chief complaint of change in mental status (25 Feb 2020 16:50)      Subjective: NG tube assess overnight for possible displacement - tube in place. Patient desaturated to 88% on RA therefore placed on 2L NC. Patient non-verbal. Continuous tube feeds.       MEDICATIONS  (STANDING):  acetaminophen  IVPB .. 1000 milliGRAM(s) IV Intermittent once  budesonide  80 MICROgram(s)/formoterol 4.5 MICROgram(s) Inhaler 2 Puff(s) Inhalation two times a day  dextrose 5%. 1000 milliLiter(s) (50 mL/Hr) IV Continuous <Continuous>  dextrose 50% Injectable 12.5 Gram(s) IV Push once  dextrose 50% Injectable 25 Gram(s) IV Push once  dextrose 50% Injectable 25 Gram(s) IV Push once  fluconAZOLE   40 mG/mL Suspension 200 milliGRAM(s) Enteral Tube daily  heparin  Injectable 5000 Unit(s) SubCutaneous every 8 hours  influenza   Vaccine 0.5 milliLiter(s) IntraMuscular once  insulin lispro (HumaLOG) corrective regimen sliding scale   SubCutaneous every 6 hours  insulin NPH human recombinant 20 Unit(s) SubCutaneous <User Schedule>  insulin NPH human recombinant 10 Unit(s) SubCutaneous <User Schedule>  levothyroxine Injectable 87.5 MICROGram(s) IV Push at bedtime  losartan 25 milliGRAM(s) Oral daily  methylPREDNISolone sodium succinate Injectable 12.5 milliGRAM(s) IV Push daily  metoprolol tartrate 25 milliGRAM(s) Oral two times a day  multivitamin/minerals/iron Oral Solution (CENTRUM) 15 milliLiter(s) Oral daily  pantoprazole  Injectable 40 milliGRAM(s) IV Push daily  primidone 50 milliGRAM(s) Oral at bedtime  simvastatin 20 milliGRAM(s) Oral at bedtime  thiamine 100 milliGRAM(s) Oral daily    MEDICATIONS  (PRN):  acetaminophen  Suppository .. 650 milliGRAM(s) Rectal every 6 hours PRN Temp greater or equal to 38C (100.4F)  albuterol/ipratropium for Nebulization 3 milliLiter(s) Nebulizer every 6 hours PRN Shortness of Breath and/or Wheezing  dextrose 40% Gel 15 Gram(s) Oral once PRN Blood Glucose LESS THAN 70 milliGRAM(s)/deciLiter  glucagon  Injectable 1 milliGRAM(s) IntraMuscular once PRN Glucose <70 milliGRAM(s)/deciLiter  melatonin 3 milliGRAM(s) Oral at bedtime PRN Insomnia      Objective:    Vitals: Vital Signs Last 24 Hrs  T(C): 36.9 (02-26-20 @ 05:49), Max: 37.1 (02-25-20 @ 21:30)  T(F): 98.4 (02-26-20 @ 05:49), Max: 98.7 (02-25-20 @ 21:30)  HR: 102 (02-26-20 @ 05:49) (95 - 102)  BP: 151/85 (02-26-20 @ 05:49) (141/100 - 151/85)  BP(mean): --  RR: 20 (02-26-20 @ 05:49) (18 - 20)  SpO2: 88% (02-26-20 @ 05:49) (88% - 96%)            I&O's Summary    25 Feb 2020 07:01  -  26 Feb 2020 07:00  --------------------------------------------------------  IN: 920 mL / OUT: 0 mL / NET: 920 mL      PHYSICAL EXAM:  GENERAL: NAD, obese  HEAD:  Atraumatic, Normocephalic. NGT in place   EYES: EOMI, PERRLA, conjunctiva and sclera clear  NECK: Supple, No JVD  CHEST/LUNG: Clear to auscultation bilaterally anteriorly; No wheeze  HEART: Regular rate and rhythm; No murmurs, rubs, or gallops  ABDOMEN: Soft, Nontender, Nondistended; Bowel sounds present. midline surgical scar with dressing in place c/d/i  EXTREMITIES:  2+ Peripheral Pulses, No clubbing, cyanosis, or edema  PSYCH: AAOx0  NEUROLOGY: non-focal  SKIN: No rashes or lesions    LABS:  02-26    143  |  103  |  17  ----------------------------<  167<H>  4.0   |  26  |  0.76  02-25    145  |  104  |  14  ----------------------------<  220<H>  3.5   |  27  |  0.77  02-24    142  |  104  |  12  ----------------------------<  302<H>  3.8   |  25  |  0.79    Ca    9.7      26 Feb 2020 06:46  Ca    9.8      25 Feb 2020 07:00  Ca    9.8      24 Feb 2020 10:11  Phos  2.6     02-26  Mg     2.0     02-26    TPro  6.6  /  Alb  3.7  /  TBili  0.1<L>  /  DBili  x   /  AST  12  /  ALT  11  /  AlkPhos  72  02-25                                              9.7    13.14 )-----------( 409      ( 26 Feb 2020 06:46 )             31.4                         9.2    11.82 )-----------( 403      ( 25 Feb 2020 07:24 )             29.9                         9.5    12.71 )-----------( 376      ( 24 Feb 2020 07:16 )             31.0     CAPILLARY BLOOD GLUCOSE      POCT Blood Glucose.: 164 mg/dL (26 Feb 2020 05:36)  POCT Blood Glucose.: 198 mg/dL (26 Feb 2020 00:11)  POCT Blood Glucose.: 265 mg/dL (25 Feb 2020 18:24)  POCT Blood Glucose.: 255 mg/dL (25 Feb 2020 17:59)  POCT Blood Glucose.: 413 mg/dL (25 Feb 2020 13:18)      RADIOLOGY & ADDITIONAL TESTS:    Imaging Personally Reviewed:  [x ] YES  [ ] NO    Consultants involved in case:   Consultant(s) Notes Reviewed:  [ x] YES  [ ] NO:   Care Discussed with Consultants/Other Providers [x ] YES  [ ] NO ***************************************************************  Logan Bryan, PGY1  Internal Medicine   pager: [phone]  ***************************************************************    AFTAB BASS  64y  MRN: [id number]    Patient is a 64y old  Female who presents with a chief complaint of change in mental status (25 Feb 2020 16:50)      Subjective: NG tube assess overnight for possible displacement - tube in place. Patient desaturated to 88% on RA therefore placed on 2L NC. Patient non-verbal. Continuous tube feeds.       MEDICATIONS  (STANDING):  acetaminophen  IVPB .. 1000 milliGRAM(s) IV Intermittent once  budesonide  80 MICROgram(s)/formoterol 4.5 MICROgram(s) Inhaler 2 Puff(s) Inhalation two times a day  dextrose 5%. 1000 milliLiter(s) (50 mL/Hr) IV Continuous <Continuous>  dextrose 50% Injectable 12.5 Gram(s) IV Push once  dextrose 50% Injectable 25 Gram(s) IV Push once  dextrose 50% Injectable 25 Gram(s) IV Push once  fluconAZOLE   40 mG/mL Suspension 200 milliGRAM(s) Enteral Tube daily  heparin  Injectable 5000 Unit(s) SubCutaneous every 8 hours  influenza   Vaccine 0.5 milliLiter(s) IntraMuscular once  insulin lispro (HumaLOG) corrective regimen sliding scale   SubCutaneous every 6 hours  insulin NPH human recombinant 20 Unit(s) SubCutaneous <User Schedule>  insulin NPH human recombinant 10 Unit(s) SubCutaneous <User Schedule>  levothyroxine Injectable 87.5 MICROGram(s) IV Push at bedtime  losartan 25 milliGRAM(s) Oral daily  methylPREDNISolone sodium succinate Injectable 12.5 milliGRAM(s) IV Push daily  metoprolol tartrate 25 milliGRAM(s) Oral two times a day  multivitamin/minerals/iron Oral Solution (CENTRUM) 15 milliLiter(s) Oral daily  pantoprazole  Injectable 40 milliGRAM(s) IV Push daily  primidone 50 milliGRAM(s) Oral at bedtime  simvastatin 20 milliGRAM(s) Oral at bedtime  thiamine 100 milliGRAM(s) Oral daily    MEDICATIONS  (PRN):  acetaminophen  Suppository .. 650 milliGRAM(s) Rectal every 6 hours PRN Temp greater or equal to 38C (100.4F)  albuterol/ipratropium for Nebulization 3 milliLiter(s) Nebulizer every 6 hours PRN Shortness of Breath and/or Wheezing  dextrose 40% Gel 15 Gram(s) Oral once PRN Blood Glucose LESS THAN 70 milliGRAM(s)/deciLiter  glucagon  Injectable 1 milliGRAM(s) IntraMuscular once PRN Glucose <70 milliGRAM(s)/deciLiter  melatonin 3 milliGRAM(s) Oral at bedtime PRN Insomnia      Objective:    Vitals: Vital Signs Last 24 Hrs  T(C): 36.9 (02-26-20 @ 05:49), Max: 37.1 (02-25-20 @ 21:30)  T(F): 98.4 (02-26-20 @ 05:49), Max: 98.7 (02-25-20 @ 21:30)  HR: 102 (02-26-20 @ 05:49) (95 - 102)  BP: 151/85 (02-26-20 @ 05:49) (141/100 - 151/85)  BP(mean): --  RR: 20 (02-26-20 @ 05:49) (18 - 20)  SpO2: 88% (02-26-20 @ 05:49) (88% - 96%)            I&O's Summary    25 Feb 2020 07:01  -  26 Feb 2020 07:00  --------------------------------------------------------  IN: 920 mL / OUT: 0 mL / NET: 920 mL      PHYSICAL EXAM:  GENERAL: NAD, obese  HEAD:  Atraumatic, Normocephalic. NGT in place   EYES: EOMI, PERRLA, conjunctiva and sclera clear  NECK: Supple, No JVD  CHEST/LUNG: Clear to auscultation bilaterally anteriorly; No wheeze  HEART: Regular rate and rhythm; No murmurs, rubs, or gallops  ABDOMEN: Soft, Nontender, Nondistended; Bowel sounds present. midline surgical scar with dressing in place c/d/i  EXTREMITIES:  2+ Peripheral Pulses, No clubbing, cyanosis, or edema  PSYCH: AAOx0  NEUROLOGY: non-focal, tremots   SKIN: edematous, no rashes or skin lesions     LABS:  02-26    143  |  103  |  17  ----------------------------<  167<H>  4.0   |  26  |  0.76  02-25    145  |  104  |  14  ----------------------------<  220<H>  3.5   |  27  |  0.77  02-24    142  |  104  |  12  ----------------------------<  302<H>  3.8   |  25  |  0.79    Ca    9.7      26 Feb 2020 06:46  Ca    9.8      25 Feb 2020 07:00  Ca    9.8      24 Feb 2020 10:11  Phos  2.6     02-26  Mg     2.0     02-26    TPro  6.6  /  Alb  3.7  /  TBili  0.1<L>  /  DBili  x   /  AST  12  /  ALT  11  /  AlkPhos  72  02-25                                              9.7    13.14 )-----------( 409      ( 26 Feb 2020 06:46 )             31.4                         9.2    11.82 )-----------( 403      ( 25 Feb 2020 07:24 )             29.9                         9.5    12.71 )-----------( 376      ( 24 Feb 2020 07:16 )             31.0     CAPILLARY BLOOD GLUCOSE      POCT Blood Glucose.: 164 mg/dL (26 Feb 2020 05:36)  POCT Blood Glucose.: 198 mg/dL (26 Feb 2020 00:11)  POCT Blood Glucose.: 265 mg/dL (25 Feb 2020 18:24)  POCT Blood Glucose.: 255 mg/dL (25 Feb 2020 17:59)  POCT Blood Glucose.: 413 mg/dL (25 Feb 2020 13:18)      RADIOLOGY & ADDITIONAL TESTS:    Imaging Personally Reviewed:  [x ] YES  [ ] NO    Consultants involved in case:   Consultant(s) Notes Reviewed:  [ x] YES  [ ] NO:   Care Discussed with Consultants/Other Providers [x ] YES  [ ] NO ***************************************************************  Logan Bryan, PGY1  Internal Medicine   pager: [phone]  ***************************************************************    AFTAB BASS  64y  MRN: [id number]    Patient is a 64y old  Female who presents with a chief complaint of change in mental status (25 Feb 2020 16:50)      Subjective: NG tube assess overnight for possible displacement - tube in place. Patient desaturated to 88% on RA therefore placed on 2L NC. Patient non-verbal. Continuous tube feeds.       MEDICATIONS  (STANDING):  acetaminophen  IVPB .. 1000 milliGRAM(s) IV Intermittent once  budesonide  80 MICROgram(s)/formoterol 4.5 MICROgram(s) Inhaler 2 Puff(s) Inhalation two times a day  dextrose 5%. 1000 milliLiter(s) (50 mL/Hr) IV Continuous <Continuous>  dextrose 50% Injectable 12.5 Gram(s) IV Push once  dextrose 50% Injectable 25 Gram(s) IV Push once  dextrose 50% Injectable 25 Gram(s) IV Push once  fluconAZOLE   40 mG/mL Suspension 200 milliGRAM(s) Enteral Tube daily  heparin  Injectable 5000 Unit(s) SubCutaneous every 8 hours  influenza   Vaccine 0.5 milliLiter(s) IntraMuscular once  insulin lispro (HumaLOG) corrective regimen sliding scale   SubCutaneous every 6 hours  insulin NPH human recombinant 20 Unit(s) SubCutaneous <User Schedule>  insulin NPH human recombinant 10 Unit(s) SubCutaneous <User Schedule>  levothyroxine Injectable 87.5 MICROGram(s) IV Push at bedtime  losartan 25 milliGRAM(s) Oral daily  methylPREDNISolone sodium succinate Injectable 12.5 milliGRAM(s) IV Push daily  metoprolol tartrate 25 milliGRAM(s) Oral two times a day  multivitamin/minerals/iron Oral Solution (CENTRUM) 15 milliLiter(s) Oral daily  pantoprazole  Injectable 40 milliGRAM(s) IV Push daily  primidone 50 milliGRAM(s) Oral at bedtime  simvastatin 20 milliGRAM(s) Oral at bedtime  thiamine 100 milliGRAM(s) Oral daily    MEDICATIONS  (PRN):  acetaminophen  Suppository .. 650 milliGRAM(s) Rectal every 6 hours PRN Temp greater or equal to 38C (100.4F)  albuterol/ipratropium for Nebulization 3 milliLiter(s) Nebulizer every 6 hours PRN Shortness of Breath and/or Wheezing  dextrose 40% Gel 15 Gram(s) Oral once PRN Blood Glucose LESS THAN 70 milliGRAM(s)/deciLiter  glucagon  Injectable 1 milliGRAM(s) IntraMuscular once PRN Glucose <70 milliGRAM(s)/deciLiter  melatonin 3 milliGRAM(s) Oral at bedtime PRN Insomnia      Objective:    Vitals: Vital Signs Last 24 Hrs  T(C): 36.9 (02-26-20 @ 05:49), Max: 37.1 (02-25-20 @ 21:30)  T(F): 98.4 (02-26-20 @ 05:49), Max: 98.7 (02-25-20 @ 21:30)  HR: 102 (02-26-20 @ 05:49) (95 - 102)  BP: 151/85 (02-26-20 @ 05:49) (141/100 - 151/85)  BP(mean): --  RR: 20 (02-26-20 @ 05:49) (18 - 20)  SpO2: 88% (02-26-20 @ 05:49) (88% - 96%)            I&O's Summary    25 Feb 2020 07:01  -  26 Feb 2020 07:00  --------------------------------------------------------  IN: 920 mL / OUT: 0 mL / NET: 920 mL      PHYSICAL EXAM:  GENERAL: NAD, obese  HEAD:  Atraumatic, Normocephalic. NGT in place   EYES: EOMI, PERRLA, conjunctiva and sclera clear  NECK: Supple, No JVD  CHEST/LUNG: Clear to auscultation bilaterally anteriorly; No wheeze  HEART: Regular rate and rhythm;   ABDOMEN: Soft, Nontender, Nondistended; Bowel sounds present. midline surgical scar with dressing in place c/d/i  EXTREMITIES:  2+ Peripheral Pulses, No clubbing, cyanosis, or edema  PSYCH: AAOx0  NEUROLOGY: non-focal, nonverbal  SKIN: edematous, no rashes or skin lesions     LABS:  02-26    143  |  103  |  17  ----------------------------<  167<H>  4.0   |  26  |  0.76  02-25    145  |  104  |  14  ----------------------------<  220<H>  3.5   |  27  |  0.77  02-24    142  |  104  |  12  ----------------------------<  302<H>  3.8   |  25  |  0.79    Ca    9.7      26 Feb 2020 06:46  Ca    9.8      25 Feb 2020 07:00  Ca    9.8      24 Feb 2020 10:11  Phos  2.6     02-26  Mg     2.0     02-26    TPro  6.6  /  Alb  3.7  /  TBili  0.1<L>  /  DBili  x   /  AST  12  /  ALT  11  /  AlkPhos  72  02-25                                              9.7    13.14 )-----------( 409      ( 26 Feb 2020 06:46 )             31.4                         9.2    11.82 )-----------( 403      ( 25 Feb 2020 07:24 )             29.9                         9.5    12.71 )-----------( 376      ( 24 Feb 2020 07:16 )             31.0     CAPILLARY BLOOD GLUCOSE      POCT Blood Glucose.: 164 mg/dL (26 Feb 2020 05:36)  POCT Blood Glucose.: 198 mg/dL (26 Feb 2020 00:11)  POCT Blood Glucose.: 265 mg/dL (25 Feb 2020 18:24)  POCT Blood Glucose.: 255 mg/dL (25 Feb 2020 17:59)  POCT Blood Glucose.: 413 mg/dL (25 Feb 2020 13:18)      RADIOLOGY & ADDITIONAL TESTS:    Imaging Personally Reviewed:  [x ] YES  [ ] NO    Consultants involved in case:   Consultant(s) Notes Reviewed:  [ x] YES  [ ] NO:   Care Discussed with Consultants/Other Providers [x ] YES  [ ] NO

## 2020-02-26 NOTE — PROGRESS NOTE ADULT - PROBLEM SELECTOR PLAN 6
bp elevated  -on toprol 50mg ER at home, d/c IV metoprolol, change to po tartrate 25mg bid and start losartan 25mg given diabetes bp elevated  -on toprol 50mg ER at home, d/c IV metoprolol, change to po tartrate 25mg bid --c/w losartan 25mg given diabetes bp elevated  -on toprol 50mg ER at home, d/c IV metoprolol, change to po tartrate 25mg bid --c/w losartan 25mg given diabetes (explained r/b/a, son agrees)

## 2020-02-26 NOTE — PROGRESS NOTE ADULT - ASSESSMENT
64F w/ a PMHx arthritis, hypothyroidism, pulmonary HTN, DM on insulin, HTN, COPD/CHRIS on noctournal CPAP and 3L home), RA on chronic prednisone, chronic tremors, SBO s/p ex lap with lysis of adhesions c/b evisceration 2/2 a coughing episode s/p repeat ex lap c/b multifocal pneumonia and hypercapnic respiratory failure requiring intubation 1/6/19, s/p bronch 1/9/19, and extubated on 1/24/19; further c/b Enterococci bacteremia) p/w agitation 3 weeks of unclear etiology, found to be significantly hypothyroid (), MRI brain found to have small right sided SDH, course c/b hyperglycemia; now w/ new change in mental status w/ repeat CT head w/o acute changes; CTA head and neck and CTV without any new changes.

## 2020-02-26 NOTE — CONSULT NOTE ADULT - PROBLEM SELECTOR RECOMMENDATION 3
Prior GOC with patient and patient's son in March 2019 - patient agreed that she did not want tracheostomy, and she would never want to live if unable to speak or eat or have to live in a nursing home. Agreed to DNR/DNI at that time    To Be Discussed with Attending Living will in chart (dated 9/2019) states that patient is full code and decisions will be made by patient's daughter Heavenly Hernández and son Norris Rush.  Per discussion with son, Norris, family would not like to pursue PEG tube and insists on S+S eval to assess if patient can eat on her own. Per son, the patient has endorsed that she would like to eat.    To Be Discussed with Attending Living will in chart (dated 9/2019) states that patient is full code and decisions will be made by patient's daughter Heavenly Hernández and son Norris Rush.  Per discussion with son, Norris, family would not like to pursue PEG tube and insists on S+S eval to assess if patient can eat on her own. Per son, the patient has endorsed that she would like to eat.

## 2020-02-26 NOTE — PROGRESS NOTE ADULT - SUBJECTIVE AND OBJECTIVE BOX
DIABETES FOLLOW UP NOTE: Saw pt earlier today  INTERVAL HX: 65 y/o F w/h/o uncontrolled T2DM with unknown complications. Also h/o functional paraplegia, hypothyroidism, pulmonary hypertension, COPD, RA on chronic prednisone, SBO s/p ex lap with lysis of adhesions c/b multifocal pneumonia with AMS of unknown etiology. Now on continuous tube feeds w glycemic control not at goal while on present insulin doses even though doses had been increased daily. Noted BG in 100s overnight but BG spiked to 300s at 12noon which has been the time BG are the highest every day. No hypoglycemia. Pt still non verbal but alert          Review of Systems:  Unable to assess, Not nodding to any questions.    Allergies    Depakote (Other)    Intolerances    Seroquel (Other)    MEDICATIONS:  fluconAZOLE   40 mG/mL Suspension 200 milliGRAM(s) Enteral Tube daily  insulin lispro (HumaLOG) corrective regimen sliding scale   SubCutaneous every 6 hours  insulin NPH human recombinant 20 Unit(s) SubCutaneous <User Schedule>  insulin NPH human recombinant 10 Unit(s) SubCutaneous <User Schedule>  levothyroxine Injectable 87.5 MICROGram(s) IV Push at bedtime  methylPREDNISolone sodium succinate Injectable 12.5 milliGRAM(s) IV Push daily  simvastatin 20 milliGRAM(s) Oral at bedtime        PHYSICAL EXAM:  VITALS: T(C): 37.1 (02-26-20 @ 13:49)  T(F): 98.7 (02-26-20 @ 13:49), Max: 98.7 (02-25-20 @ 21:30)  HR: 104 (02-26-20 @ 13:49) (95 - 104)  BP: 151/93 (02-26-20 @ 13:49) (151/83 - 151/93)  RR:  (20 - 20)  SpO2:  (88% - 97%)  Wt(kg): --  GENERAL: Female laying in bed in NAD  Abdomen: Soft, nontender, non distended, obese  Extremities: Warm, no edema in all 4 exts  NEURO: Alert but not following any commands.     LABS:  POCT Blood Glucose.: 372 mg/dL (02-26-20 @ 13:12)  POCT Blood Glucose.: 351 mg/dL (02-26-20 @ 11:36)  POCT Blood Glucose.: 164 mg/dL (02-26-20 @ 05:36)  POCT Blood Glucose.: 198 mg/dL (02-26-20 @ 00:11)  POCT Blood Glucose.: 265 mg/dL (02-25-20 @ 18:24)  POCT Blood Glucose.: 255 mg/dL (02-25-20 @ 17:59)  POCT Blood Glucose.: 413 mg/dL (02-25-20 @ 13:18)  POCT Blood Glucose.: 214 mg/dL (02-25-20 @ 05:36)  POCT Blood Glucose.: 225 mg/dL (02-24-20 @ 23:47)  POCT Blood Glucose.: 288 mg/dL (02-24-20 @ 18:08)  POCT Blood Glucose.: 374 mg/dL (02-24-20 @ 11:59)  POCT Blood Glucose.: 176 mg/dL (02-24-20 @ 05:44)  POCT Blood Glucose.: 157 mg/dL (02-23-20 @ 23:53)  POCT Blood Glucose.: 227 mg/dL (02-23-20 @ 17:40)                            9.7    13.14 )-----------( 409      ( 26 Feb 2020 06:46 )             31.4       02-26    143  |  103  |  17  ----------------------------<  167<H>  4.0   |  26  |  0.76    EGFR if : 97    Ca    9.7      02-26  Mg     2.0     02-26  Phos  2.6     02-26    TPro  6.6  /  Alb  3.7  /  TBili  0.1<L>  /  DBili  x   /  AST  12  /  ALT  11  /  AlkPhos  72  02-25      Thyroid Function Tests:  02-23 @ 13:25 TSH -- FreeT4 -- T3 53 Anti TPO -- Anti Thyroglobulin Ab -- TSI --  02-13 @ 09:05 TSH 17.80 FreeT4 -- T3 -- Anti TPO -- Anti Thyroglobulin Ab -- TSI --      Hemoglobin A1C, Whole Blood: 9.0 % <H> [4.0 - 5.6] (01-28-20 @ 19:30)  Hemoglobin A1C, Whole Blood: 9.2 % <H> [4.0 - 5.6] (01-27-20 @ 17:36)      01-27 Chol 307<H> <H> HDL 50 Trig 352<H>

## 2020-02-26 NOTE — PROGRESS NOTE ADULT - PROBLEM SELECTOR PLAN 1
Unclear etiology. Per family this is rapidly progressing. Patient had some minor memory impairments roughly 30 days prior to admission when she started suffering from memory loss and paranoid delusions.   -Ddx includes psychosis/delirium 2/2 hypothyroidism. PE significant for myxedema. , free T4 0.3. Endocrine following. Now on IV synthroid. Ddx also includes FT dementia   -MRI brain 3mm right sided SDH no neurosurgical intervention   -LP 2/6 CSF studies wnl.   -Paraneoplastic panel neg  -VEEG no seizure activity   - rheumatology state rheum origin less likely given neg for inflammatory changes; serum ALESSIA, ZAFAR, ribosomal P, dsdna neg, NMDRAb neg   -psych states catatonia unlikely, can use trial of ativan  -repeat head CT no acute findings 2/20  -2/23 T4 5.5 wnl, T3 53 LOW  -lead level negative   -Ceruloplasmin wnl Unclear etiology. Per family this is rapidly progressing. Patient had some minor memory impairments roughly 30 days prior to admission when she started suffering from memory loss and paranoid delusions.   -Ddx includes psychosis/delirium 2/2 hypothyroidism. PE significant for myxedema. , free T4 0.3. Endocrine following. Now on IV synthroid. Ddx also includes FT dementia   -MRI brain 3mm right sided SDH no neurosurgical intervention   -LP 2/6 CSF studies wnl.   -Paraneoplastic panel neg  -VEEG no seizure activity   - rheumatology state rheum origin less likely given neg for inflammatory changes; serum ALESSIA, ZAFAR, ribosomal P, dsdna neg, NMDRAb neg   -psych states catatonia unlikely, can use trial of ativan  -repeat head CT no acute findings 2/20  -2/23 T4 5.5 wnl, T3 53 LOW  -lead level negative   -Ceruloplasmin wnl  -neuro reconsulted 2/26 regarding most likely diagnosis

## 2020-02-26 NOTE — PROGRESS NOTE ADULT - ATTENDING COMMENTS
pt seen/examined:  I called and Spoke to son Norris over the phone extensively as he was not bedside when I came by today.   He reports that mother told him that "she wants to eat" this AM however patient was not able to repeat this to me when I was bedside and was not able to tell this to the resident this AM when he son was bedside this morning.   Patient is awake but nonverbal, not responding to commands.    Subacute Encephalopathy: unclear etiology, possibly frontotemporal dementia with behavioral disturbances and agitation, per neuro   -still nonverbal, not responding to commands, unchanged exam  -did have severe hypothyroidism on admission but now on synthroid with improving TFT, appreciate endo recs, needs repeat in 4-6 weeks  -also had SDH which is stable on imaging  -brain CT and MRI with no acute findings  -s/p LP, multiple negative CSF studies including, autoimmune, paraneoplastic panel, Purkinje fibers, etc all negative  -EEG showing diffuse cerebral slowing/dysfunction- no epileptiform activity  -avoid Depakote given reported hx of catatonic state in past per son  -discussed with neuro again today, who will reevaluate today  -appreciate psych recs    Candida UTI:  UA impressive but afebrile, doubt this is causing AMS, discussed with ID Dr Gomez, complete 5-7 day course of fluconazole  -previously complete course of zosyn for Ecoli UTI  -wbc 13, but afebrile, HD stable ?steroids, ID following, monitor    Nutrition, c/w NG feeds,, per son he wants to keep NG in for today and requests speech eval as he is refusing PEG and wants his mom to eat again which at this time not seem reasonable given that she does not respond to commands, CXR overnight confirms NG still in place  poor prognosis   palliative care consult appreciated

## 2020-02-26 NOTE — CONSULT NOTE ADULT - PROBLEM SELECTOR RECOMMENDATION 9
-TSH of 100, likely since the patient because of her AMS has not been receiving levothyroxine for the past week   -At this time, given that the patient is refusing oral levothyroxine  -Will switch to IV levothyroxine of 100 mcg
-continue management per primary team

## 2020-02-26 NOTE — PROGRESS NOTE ADULT - PROBLEM SELECTOR PLAN 7
- Bcx + for GPR on 1/12, 1 out of 2 bottles. +Bcx from 1/12 sent to [facility] laboratories, no further speciation available.   - Blood cx obtained on 1/25 no growth X 5 days. Repeat 2/14 NGTD.   - repeat blood cultures negative  -apprecaite ID recs - Bcx + for GPR on 1/12, 1 out of 2 bottles. +Bcx from 1/12 sent to [facility] laboratories, no further speciation available.   - Blood cx obtained on 1/25 no growth X 5 days. Repeat 2/14 NGTD.   - repeat blood cultures negative

## 2020-02-26 NOTE — CONSULT NOTE ADULT - PROBLEM SELECTOR RECOMMENDATION 2
-continue management per primary team
-At this time, will continue her on the home dose of Lantus 20 units at bedtime  -Novolog 4 units before meals and low dose correctional scale  -So far has not been taking her insulin, psych genoveva has been pending   -Carb consistent diet   -FS AC + HS ( Has not been allowing it)     Discharge recs:   Will be discharged on basal bolus but will decide dosages closer to discharge

## 2020-02-27 LAB
ANION GAP SERPL CALC-SCNC: 16 MMOL/L — SIGNIFICANT CHANGE UP (ref 5–17)
BUN SERPL-MCNC: 16 MG/DL — SIGNIFICANT CHANGE UP (ref 7–23)
CALCIUM SERPL-MCNC: 9.2 MG/DL — SIGNIFICANT CHANGE UP (ref 8.4–10.5)
CHLORIDE SERPL-SCNC: 100 MMOL/L — SIGNIFICANT CHANGE UP (ref 96–108)
CO2 SERPL-SCNC: 26 MMOL/L — SIGNIFICANT CHANGE UP (ref 22–31)
CREAT SERPL-MCNC: 0.83 MG/DL — SIGNIFICANT CHANGE UP (ref 0.5–1.3)
GLUCOSE BLDC GLUCOMTR-MCNC: 116 MG/DL — HIGH (ref 70–99)
GLUCOSE BLDC GLUCOMTR-MCNC: 148 MG/DL — HIGH (ref 70–99)
GLUCOSE BLDC GLUCOMTR-MCNC: 232 MG/DL — HIGH (ref 70–99)
GLUCOSE BLDC GLUCOMTR-MCNC: 351 MG/DL — HIGH (ref 70–99)
GLUCOSE SERPL-MCNC: 113 MG/DL — HIGH (ref 70–99)
HCT VFR BLD CALC: 32.8 % — LOW (ref 34.5–45)
HGB BLD-MCNC: 9.3 G/DL — LOW (ref 11.5–15.5)
MAGNESIUM SERPL-MCNC: 1.9 MG/DL — SIGNIFICANT CHANGE UP (ref 1.6–2.6)
MCHC RBC-ENTMCNC: 28.4 GM/DL — LOW (ref 32–36)
MCHC RBC-ENTMCNC: 29.3 PG — SIGNIFICANT CHANGE UP (ref 27–34)
MCV RBC AUTO: 103.5 FL — HIGH (ref 80–100)
NRBC # BLD: 2 /100 WBCS — HIGH (ref 0–0)
PHOSPHATE SERPL-MCNC: 3 MG/DL — SIGNIFICANT CHANGE UP (ref 2.5–4.5)
PLATELET # BLD AUTO: 346 K/UL — SIGNIFICANT CHANGE UP (ref 150–400)
POTASSIUM SERPL-MCNC: 3.7 MMOL/L — SIGNIFICANT CHANGE UP (ref 3.5–5.3)
POTASSIUM SERPL-SCNC: 3.7 MMOL/L — SIGNIFICANT CHANGE UP (ref 3.5–5.3)
RBC # BLD: 3.17 M/UL — LOW (ref 3.8–5.2)
RBC # FLD: 14.8 % — HIGH (ref 10.3–14.5)
SODIUM SERPL-SCNC: 142 MMOL/L — SIGNIFICANT CHANGE UP (ref 135–145)
WBC # BLD: 10.48 K/UL — SIGNIFICANT CHANGE UP (ref 3.8–10.5)
WBC # FLD AUTO: 10.48 K/UL — SIGNIFICANT CHANGE UP (ref 3.8–10.5)

## 2020-02-27 PROCEDURE — 99233 SBSQ HOSP IP/OBS HIGH 50: CPT

## 2020-02-27 PROCEDURE — 99233 SBSQ HOSP IP/OBS HIGH 50: CPT | Mod: GC

## 2020-02-27 PROCEDURE — 99356: CPT

## 2020-02-27 PROCEDURE — 99232 SBSQ HOSP IP/OBS MODERATE 35: CPT

## 2020-02-27 RX ORDER — INSULIN LISPRO 100/ML
VIAL (ML) SUBCUTANEOUS
Refills: 0 | Status: DISCONTINUED | OUTPATIENT
Start: 2020-02-27 | End: 2020-03-07

## 2020-02-27 RX ORDER — INSULIN GLARGINE 100 [IU]/ML
20 INJECTION, SOLUTION SUBCUTANEOUS
Refills: 0 | Status: DISCONTINUED | OUTPATIENT
Start: 2020-02-27 | End: 2020-02-29

## 2020-02-27 RX ORDER — INSULIN LISPRO 100/ML
VIAL (ML) SUBCUTANEOUS AT BEDTIME
Refills: 0 | Status: DISCONTINUED | OUTPATIENT
Start: 2020-02-27 | End: 2020-03-07

## 2020-02-27 RX ORDER — INSULIN LISPRO 100/ML
6 VIAL (ML) SUBCUTANEOUS
Refills: 0 | Status: DISCONTINUED | OUTPATIENT
Start: 2020-02-27 | End: 2020-02-28

## 2020-02-27 RX ADMIN — Medication 12.5 MILLIGRAM(S): at 06:12

## 2020-02-27 RX ADMIN — INSULIN GLARGINE 20 UNIT(S): 100 INJECTION, SOLUTION SUBCUTANEOUS at 09:15

## 2020-02-27 RX ADMIN — Medication 4: at 18:02

## 2020-02-27 RX ADMIN — HEPARIN SODIUM 5000 UNIT(S): 5000 INJECTION INTRAVENOUS; SUBCUTANEOUS at 22:43

## 2020-02-27 RX ADMIN — Medication 1 MILLIGRAM(S): at 15:41

## 2020-02-27 RX ADMIN — Medication 1 MILLIGRAM(S): at 08:32

## 2020-02-27 RX ADMIN — HEPARIN SODIUM 5000 UNIT(S): 5000 INJECTION INTRAVENOUS; SUBCUTANEOUS at 06:12

## 2020-02-27 RX ADMIN — Medication 6 UNIT(S): at 12:05

## 2020-02-27 RX ADMIN — HEPARIN SODIUM 5000 UNIT(S): 5000 INJECTION INTRAVENOUS; SUBCUTANEOUS at 15:44

## 2020-02-27 RX ADMIN — Medication 2: at 00:17

## 2020-02-27 RX ADMIN — PANTOPRAZOLE SODIUM 40 MILLIGRAM(S): 20 TABLET, DELAYED RELEASE ORAL at 18:04

## 2020-02-27 RX ADMIN — Medication 1 MILLIGRAM(S): at 12:18

## 2020-02-27 RX ADMIN — Medication 10: at 12:05

## 2020-02-27 RX ADMIN — Medication 87.5 MICROGRAM(S): at 23:38

## 2020-02-27 RX ADMIN — Medication 6 UNIT(S): at 18:02

## 2020-02-27 RX ADMIN — Medication 25 MILLIGRAM(S): at 18:11

## 2020-02-27 NOTE — PROGRESS NOTE ADULT - PROBLEM SELECTOR PLAN 6
bp elevated  -on toprol 50mg ER at home, d/c IV metoprolol, change to po tartrate 25mg bid --c/w losartan 25mg given diabetes (explained r/b/a, son agrees) bp slowly improving  -c/w metoprolol tartrate 25mg bid   -c/w losartan 25mg given diabetes

## 2020-02-27 NOTE — PROGRESS NOTE ADULT - SUBJECTIVE AND OBJECTIVE BOX
***************************************************************  Logan Bryan, PGY1  Internal Medicine   pager: [phone]  ***************************************************************    AFTAB BASS  64y  MRN: [id number]    Patient is a 64y old  Female who presents with a chief complaint of change in mental status (26 Feb 2020 15:55)      Subjective:  trial of 1mg Ativan yesterday evening for suspected catatonia. Patient more alert and verbal after challenge. Ativan TID started. Overnight patient pulled out her NG tube. NPH discontinued and Lantus 20U started by night float team. This morning     MEDICATIONS  (STANDING):  acetaminophen  IVPB .. 1000 milliGRAM(s) IV Intermittent once  budesonide  80 MICROgram(s)/formoterol 4.5 MICROgram(s) Inhaler 2 Puff(s) Inhalation two times a day  dextrose 5%. 1000 milliLiter(s) (50 mL/Hr) IV Continuous <Continuous>  dextrose 50% Injectable 12.5 Gram(s) IV Push once  dextrose 50% Injectable 25 Gram(s) IV Push once  dextrose 50% Injectable 25 Gram(s) IV Push once  fluconAZOLE   40 mG/mL Suspension 200 milliGRAM(s) Enteral Tube daily  heparin  Injectable 5000 Unit(s) SubCutaneous every 8 hours  influenza   Vaccine 0.5 milliLiter(s) IntraMuscular once  insulin glargine Injectable (LANTUS) 20 Unit(s) SubCutaneous every morning  insulin lispro (HumaLOG) corrective regimen sliding scale   SubCutaneous three times a day before meals  insulin lispro (HumaLOG) corrective regimen sliding scale   SubCutaneous at bedtime  levothyroxine Injectable 87.5 MICROGram(s) IV Push at bedtime  LORazepam   Injectable 1 milliGRAM(s) IV Push <User Schedule>  losartan 25 milliGRAM(s) Oral daily  methylPREDNISolone sodium succinate Injectable 12.5 milliGRAM(s) IV Push daily  metoprolol tartrate 25 milliGRAM(s) Oral two times a day  multivitamin/minerals/iron Oral Solution (CENTRUM) 15 milliLiter(s) Oral daily  pantoprazole  Injectable 40 milliGRAM(s) IV Push daily  primidone 50 milliGRAM(s) Oral at bedtime  simvastatin 20 milliGRAM(s) Oral at bedtime  thiamine 100 milliGRAM(s) Oral daily    MEDICATIONS  (PRN):  acetaminophen  Suppository .. 650 milliGRAM(s) Rectal every 6 hours PRN Temp greater or equal to 38C (100.4F)  albuterol/ipratropium for Nebulization 3 milliLiter(s) Nebulizer every 6 hours PRN Shortness of Breath and/or Wheezing  dextrose 40% Gel 15 Gram(s) Oral once PRN Blood Glucose LESS THAN 70 milliGRAM(s)/deciLiter  glucagon  Injectable 1 milliGRAM(s) IntraMuscular once PRN Glucose <70 milliGRAM(s)/deciLiter  haloperidol    Injectable 5 milliGRAM(s) IV Push every 6 hours PRN Agitation  melatonin 3 milliGRAM(s) Oral at bedtime PRN Insomnia      Objective:    Vitals: Vital Signs Last 24 Hrs  T(C): 37.1 (02-27-20 @ 04:22), Max: 37.1 (02-26-20 @ 13:49)  T(F): 98.8 (02-27-20 @ 04:22), Max: 98.8 (02-26-20 @ 22:33)  HR: 105 (02-27-20 @ 06:23) (103 - 106)  BP: 139/82 (02-27-20 @ 06:23) (138/81 - 162/98)  BP(mean): --  RR: 18 (02-27-20 @ 04:22) (18 - 20)  SpO2: 92% (02-27-20 @ 04:22) (92% - 97%)            I&O's Summary    26 Feb 2020 07:01  -  27 Feb 2020 07:00  --------------------------------------------------------  IN: 580 mL / OUT: 0 mL / NET: 580 mL      PHYSICAL EXAM:  GENERAL: NAD, obese  HEAD:  Atraumatic, Normocephalic.  EYES: EOMI, PERRLA, conjunctiva and sclera clear  NECK: Supple, No JVD  CHEST/LUNG: Clear to auscultation bilaterally anteriorly; No wheeze  HEART: Regular rate and rhythm;   ABDOMEN: Soft, Nontender, Nondistended; Bowel sounds present. midline surgical scar with dressing in place c/d/i  EXTREMITIES:  2+ Peripheral Pulses, No clubbing, cyanosis, or edema  PSYCH: AAOx0  NEUROLOGY: non-focal, nonverbal  SKIN: edematous, no rashes or skin lesions     LABS:  02-26    143  |  103  |  17  ----------------------------<  167<H>  4.0   |  26  |  0.76  02-25    145  |  104  |  14  ----------------------------<  220<H>  3.5   |  27  |  0.77  02-24    142  |  104  |  12  ----------------------------<  302<H>  3.8   |  25  |  0.79    Ca    9.7      26 Feb 2020 06:46  Ca    9.8      25 Feb 2020 07:00  Ca    9.8      24 Feb 2020 10:11  Phos  2.6     02-26  Mg     2.0     02-26    TPro  6.6  /  Alb  3.7  /  TBili  0.1<L>  /  DBili  x   /  AST  12  /  ALT  11  /  AlkPhos  72  02-25                                              9.3    10.48 )-----------( 346      ( 27 Feb 2020 07:12 )             32.8                         9.7    13.14 )-----------( 409      ( 26 Feb 2020 06:46 )             31.4                         9.2    11.82 )-----------( 403      ( 25 Feb 2020 07:24 )             29.9     CAPILLARY BLOOD GLUCOSE      POCT Blood Glucose.: 116 mg/dL (27 Feb 2020 06:13)  POCT Blood Glucose.: 168 mg/dL (26 Feb 2020 23:48)  POCT Blood Glucose.: 239 mg/dL (26 Feb 2020 18:08)  POCT Blood Glucose.: 372 mg/dL (26 Feb 2020 13:12)  POCT Blood Glucose.: 351 mg/dL (26 Feb 2020 11:36)      RADIOLOGY & ADDITIONAL TESTS:    Imaging Personally Reviewed:  [x ] YES  [ ] NO    Consultants involved in case:   Consultant(s) Notes Reviewed:  [ x] YES  [ ] NO:   Care Discussed with Consultants/Other Providers [x ] YES  [ ] NO ***************************************************************  Logan Bryan, PGY1  Internal Medicine   pager: [phone]  ***************************************************************    AFTAB BASS  64y  MRN: [id number]    Patient is a 64y old  Female who presents with a chief complaint of change in mental status (26 Feb 2020 15:55)      Subjective:  psych and neuro rec trial of 1mg Ativan yesterday evening for suspected catatonia. Patient more alert and verbal after ativan however agitated Ativan TID started, discussed with son who agreed for ativan. Overnight patient pulled out her NG tube. NPH discontinued and Lantus 20U started by night float team. This morning patient awake but agitated, asking to talk to her outside doctors. REfusing to give more history and asking to go home.     MEDICATIONS  (STANDING):  acetaminophen  IVPB .. 1000 milliGRAM(s) IV Intermittent once  budesonide  80 MICROgram(s)/formoterol 4.5 MICROgram(s) Inhaler 2 Puff(s) Inhalation two times a day  dextrose 5%. 1000 milliLiter(s) (50 mL/Hr) IV Continuous <Continuous>  dextrose 50% Injectable 12.5 Gram(s) IV Push once  dextrose 50% Injectable 25 Gram(s) IV Push once  dextrose 50% Injectable 25 Gram(s) IV Push once  fluconAZOLE   40 mG/mL Suspension 200 milliGRAM(s) Enteral Tube daily  heparin  Injectable 5000 Unit(s) SubCutaneous every 8 hours  influenza   Vaccine 0.5 milliLiter(s) IntraMuscular once  insulin glargine Injectable (LANTUS) 20 Unit(s) SubCutaneous every morning  insulin lispro (HumaLOG) corrective regimen sliding scale   SubCutaneous three times a day before meals  insulin lispro (HumaLOG) corrective regimen sliding scale   SubCutaneous at bedtime  levothyroxine Injectable 87.5 MICROGram(s) IV Push at bedtime  LORazepam   Injectable 1 milliGRAM(s) IV Push <User Schedule>  losartan 25 milliGRAM(s) Oral daily  methylPREDNISolone sodium succinate Injectable 12.5 milliGRAM(s) IV Push daily  metoprolol tartrate 25 milliGRAM(s) Oral two times a day  multivitamin/minerals/iron Oral Solution (CENTRUM) 15 milliLiter(s) Oral daily  pantoprazole  Injectable 40 milliGRAM(s) IV Push daily  primidone 50 milliGRAM(s) Oral at bedtime  simvastatin 20 milliGRAM(s) Oral at bedtime  thiamine 100 milliGRAM(s) Oral daily    MEDICATIONS  (PRN):  acetaminophen  Suppository .. 650 milliGRAM(s) Rectal every 6 hours PRN Temp greater or equal to 38C (100.4F)  albuterol/ipratropium for Nebulization 3 milliLiter(s) Nebulizer every 6 hours PRN Shortness of Breath and/or Wheezing  dextrose 40% Gel 15 Gram(s) Oral once PRN Blood Glucose LESS THAN 70 milliGRAM(s)/deciLiter  glucagon  Injectable 1 milliGRAM(s) IntraMuscular once PRN Glucose <70 milliGRAM(s)/deciLiter  haloperidol    Injectable 5 milliGRAM(s) IV Push every 6 hours PRN Agitation  melatonin 3 milliGRAM(s) Oral at bedtime PRN Insomnia      Objective:    Vitals: Vital Signs Last 24 Hrs  T(C): 37.1 (02-27-20 @ 04:22), Max: 37.1 (02-26-20 @ 13:49)  T(F): 98.8 (02-27-20 @ 04:22), Max: 98.8 (02-26-20 @ 22:33)  HR: 105 (02-27-20 @ 06:23) (103 - 106)  BP: 139/82 (02-27-20 @ 06:23) (138/81 - 162/98)  BP(mean): --  RR: 18 (02-27-20 @ 04:22) (18 - 20)  SpO2: 92% (02-27-20 @ 04:22) (92% - 97%)            I&O's Summary    26 Feb 2020 07:01  -  27 Feb 2020 07:00  --------------------------------------------------------  IN: 580 mL / OUT: 0 mL / NET: 580 mL      PHYSICAL EXAM:  GENERAL: NAD, obese  HEAD:  Atraumatic, Normocephalic.  EYES: EOMI, PERRLA, conjunctiva and sclera clear  NECK: Supple, No JVD  CHEST/LUNG: Clear to auscultation bilaterally anteriorly; No wheeze  HEART: Regular rate and rhythm;   ABDOMEN: Soft, Nontender, Nondistended; Bowel sounds present. midline surgical scar with dressing in place c/d/i  EXTREMITIES:  2+ Peripheral Pulses, No clubbing, cyanosis, or edema  PSYCH: AAOx0  NEUROLOGY: non-focal, nonverbal  SKIN: edematous, no rashes or skin lesions     LABS:  02-26    143  |  103  |  17  ----------------------------<  167<H>  4.0   |  26  |  0.76  02-25    145  |  104  |  14  ----------------------------<  220<H>  3.5   |  27  |  0.77  02-24    142  |  104  |  12  ----------------------------<  302<H>  3.8   |  25  |  0.79    Ca    9.7      26 Feb 2020 06:46  Ca    9.8      25 Feb 2020 07:00  Ca    9.8      24 Feb 2020 10:11  Phos  2.6     02-26  Mg     2.0     02-26    TPro  6.6  /  Alb  3.7  /  TBili  0.1<L>  /  DBili  x   /  AST  12  /  ALT  11  /  AlkPhos  72  02-25                                              9.3    10.48 )-----------( 346      ( 27 Feb 2020 07:12 )             32.8                         9.7    13.14 )-----------( 409      ( 26 Feb 2020 06:46 )             31.4                         9.2    11.82 )-----------( 403      ( 25 Feb 2020 07:24 )             29.9     CAPILLARY BLOOD GLUCOSE      POCT Blood Glucose.: 116 mg/dL (27 Feb 2020 06:13)  POCT Blood Glucose.: 168 mg/dL (26 Feb 2020 23:48)  POCT Blood Glucose.: 239 mg/dL (26 Feb 2020 18:08)  POCT Blood Glucose.: 372 mg/dL (26 Feb 2020 13:12)  POCT Blood Glucose.: 351 mg/dL (26 Feb 2020 11:36)      RADIOLOGY & ADDITIONAL TESTS:    Imaging Personally Reviewed:  [x ] YES  [ ] NO    Consultants involved in case:   Consultant(s) Notes Reviewed:  [ x] YES  [ ] NO:   Care Discussed with Consultants/Other Providers [x ] YES  [ ] NO

## 2020-02-27 NOTE — PROGRESS NOTE ADULT - ASSESSMENT
64 F hx functional paraplegia bedbound, hypothyroidism, pulmonary HTN, DM on insulin, COPD/CHRIS on noctournal CPAP and 3L home), RA on chronic prednisone, chronic tremors, SBO s/p ex lap with lysis of adhesions (4 retention sutures), prior history enterococcus bacteremia.  Leukocytosis, prior fevers  Worsening AMS  UA positive, UCX Candida  CT A/P neg, without signs infection (no signs of invasive fungal infection)  CXR clear on my personal review without signs pna  Mental status improved, seems likely AMS was due to infection  Overall,  1) UTI  - S/p 5 days Fluconazole, discontinue  - Monitor mental status  - F/U pending BCX  - Continue off other abx for now, but low threshold to start broad spec abx if clinical worsening  2) Leukocytosis  - Trend to normal  3) AMS  - Monitor mental status  - Noninfectious causes AMS per primary team    Nabor Gomez MD  Pager [phone]  After 5pm and on weekends call [phone]

## 2020-02-27 NOTE — CHART NOTE - NSCHARTNOTEFT_GEN_A_CORE
Patient currently combative and refusing to take statin and primidone. As neither medication has an IV form and neither are immediately needed in care, staff was given ok to hold medications at this time.

## 2020-02-27 NOTE — CHART NOTE - NSCHARTNOTEFT_GEN_A_CORE
Patient pulled NG tube before start of night shift. Not replaced. Patient not tolerating night staff replacing. Patient alert and passed bedside dysphagia screen. Given CC diet. 20U lantus scheduled for AM, patient currently on q6h finger sticks and SSI as PO intake for night may not be 100%. Patient will need reassessment of insulin regimen in the morning if she remains alert enough to tolerate PO long term with continued ativan use.

## 2020-02-27 NOTE — SWALLOW BEDSIDE ASSESSMENT ADULT - SLP PERTINENT HISTORY OF CURRENT PROBLEM
Reason for Admission: 1/25: AMS. 64f hx functional paraplegia bedbound, hypothyroidism, pulmonary HTN, DM on insulin, COPD/CHRIS on noctournal CPAP and 3L home), RA on chronic prednisone, chronic tremors, SBO s/p ex lap with lysis of adhesions (4 retention sutures) c/b eviscerated 2/2 a coughing episode s/p ex lap (6 retention sutures) c/b multifocal pneumonia, developed hypercapnic respiratory failure requiring intubation 1/6/19, s/p bronch 1/9/19, and extubated on 1/24/19, course further c/b Enterococci bacteremia. Pt unreliable historian, history obtained from Son Norris(lives w/ patient) and Daughter who claim to be HCP. Patient's behavior was first noted to change 3weeks ago. She was accusatory of family for 'stealing $180k from her closet". Patient has fired multiple HHAs claiming they were not useful for her needs. Recently has been spitting at HHAs and caretaker son, throwing her bedpan filled with urine and feces at them. In the ER patient agitated requiring zyprexa & haloperidol
Reason for Admission: 1/25: AMS. 64f hx functional paraplegia bedbound, hypothyroidism, pulmonary HTN, DM on insulin, COPD/CHRIS on noctournal CPAP and 3L home), RA on chronic prednisone, chronic tremors, SBO s/p ex lap with lysis of adhesions (4 retention sutures) c/b eviscerated 2/2 a coughing episode s/p ex lap (6 retention sutures) c/b multifocal pneumonia, developed hypercapnic respiratory failure requiring intubation 1/6/19, s/p bronch 1/9/19, and extubated on 1/24/19, course further c/b Enterococci bacteremia. Pt unreliable historian, history obtained from Son Norris(lives w/ patient) and Daughter who claim to be HCP. Patient's behavior was first noted to change 3weeks ago. She was accusatory of family for 'stealing $180k from her closet". Patient has fired multiple HHAs claiming they were not useful for her needs. Recently has been spitting at HHAs and caretaker son, throwing her bedpan filled with urine and feces at them. In the ER patient agitated requiring zyprexa & haloperidol

## 2020-02-27 NOTE — PROGRESS NOTE ADULT - PROBLEM SELECTOR PLAN 3
- patient is asking to speak with a  and a "patient advocate for the aging"  - will sign off, please reconsult PRN

## 2020-02-27 NOTE — PROGRESS NOTE ADULT - ATTENDING COMMENTS
pt seen/examined:  Discussed with psych yesterday, Dr Tomlinson who rec trial of ativan for catatonia which son Norris agreed to.   Within a few minutes of getting ativan yesterday, patient became more alert and started to talk however at times is agitated and fully making sense. Pulled out her NG tube, son does not want reinserted. Passed bedside swallow exam by RN and tolerating food this AM, but not cooperating with speech eval this afternoon    Subacute Encephalopathy: Lethargy and nonverbal state Likely due to catatonic state now improved with ativan however still likely with underlying possible frontotemporal dementia with behavioral disturbances vs other dementia given agitation and AMS she initially presented for  -did have severe hypothyroidism on admission but now on synthroid with improving TFTs, appreciate endo recs, needs repeat in 4-6 weeks  -also had SDH which is stable on imaging  -brain CT and MRI with no acute findings  -s/p LP, multiple negative CSF studies including, autoimmune, paraneoplastic panel, Purkinje fibers, etc all negative  -EEG showing diffuse cerebral slowing/dysfunction- no epileptiform activity  -avoid Depakote given reported hx of catatonic state in past per son  -neuro attending to evaluate patient today  -appreciate psych recs, c/w ativan 1mg TID for now, son is agreeable    Candida UTI:  resolved, completed 5 day course of fluconazole per ID, not contributing to AMS  -previously complete course of zosyn for Ecoli UTI  -wbc improved, afebrile, HD stable ?steroids,     Nutrition/Type 2 Diabetes: pulled NG tube overnight, passed RN dysphagia screen, asking to eat, speech eval rec soft diet for now  -FS elevated, endocrine to adjust insulin given po feeds  -repeat PT eval pt seen/examined:  Discussed with psych yesterday, Dr Tomlinson who rec trial of ativan for catatonia which son Norris agreed to.   Within a few minutes of getting ativan yesterday, patient became more alert and started to talk however at times is agitated and fully making sense. Pulled out her NG tube, son does not want reinserted. Passed bedside swallow exam by RN and tolerating food this AM, but not cooperating with speech eval this afternoon    Subacute Encephalopathy: Lethargy and nonverbal state Likely due to catatonic state now improved with ativan however still likely with underlying possible frontotemporal dementia with behavioral disturbances vs other dementia given agitation and AMS she initially presented for  -did have severe hypothyroidism on admission but now on synthroid with improving TFTs, appreciate endo recs, needs repeat in 4-6 weeks  -also had SDH which is stable on imaging  -brain CT and MRI with no acute findings  -s/p LP, multiple negative CSF studies including, autoimmune, paraneoplastic panel, Purkinje fibers, etc all negative  -EEG showing diffuse cerebral slowing/dysfunction- no epileptiform activity  -avoid Depakote given reported hx of catatonic state in past per son  -neuro attending to evaluate patient today  -appreciate psych recs, c/w ativan 1mg TID for now, son is agreeable    Candida UTI:  resolved, completed 5 day course of fluconazole per ID, not contributing to AMS  -previously complete course of zosyn for Ecoli UTI  -wbc improved, afebrile, HD stable ?steroids,     Nutrition/Type 2 Diabetes: pulled NG tube overnight, passed RN dysphagia screen, asking to eat, speech eval rec soft diet for now  -FS elevated, endocrine to adjust insulin given po feeds  -gradually switch to oral meds if tolerating diet  -repeat PT eval

## 2020-02-27 NOTE — SWALLOW BEDSIDE ASSESSMENT ADULT - COMMENTS
CT Head 1/12: No acute intracranial hemorrhage or mass effect. Neurology 1/25: Impression: severe hypothyroidism versus low suspicion of Hashimoto encephalopathy (given intact level of consciousness), low suspicion for autoimmune encephalitis given the known metabolic derangements  Internal medicine: Pt on 1:1 for psychosis with high aggression. Does not have capacity to refuse meds or leave. R/O Gram positive bacterial infection -->Blood cx obtained on 1/25 no growth X 5 days.  Rheum following: acute metabolic encephalopathy =likely 2/2 severe hypothyroid state given elevated TSH. c/w IV synthroid per endo. suggested MRI if MRA/MRV negative. +LP. 2/3: LP and MR imaging studies pending ethics re-eval. Ethics consult initiated 2/4 to assist with shared decision making regarding plan of care for a patient with encephalopathy, hyperglycemia and severe hypothyroidism given that patient is actively refusing certain treatments, imaging and medical interventions. In this case, the patient requires continued medical management using restraints (chemical or physical) when necessary to prevent life threatening complications and accurate prognostication as well as attempting to restore capacity. In this case, MRI and Lumbar Puncture under anesthesia would be permissible for prognostication of the patient’s altered mental status.  2/6: Lumbar puncture performed & MRI brain and MRA/MRA brain and neck. MRI Head 2/6: Right holohemispheric subdural hematoma subacute by signal characteristics measuring 3 mm in thickness and not clearly identified on the patient's prior CT 1/12/2020. No acute/subacute infarct or abnormal nchymal/leptomeningeal enhancement. No Neurosurgical intervention. RRT 2/12: Patient taken emergently to CT scan which Asymetric high density involving the right transverse sinus, which is new when compared to prior brain ct 1/12/20. Findings may represent thrombosis and less likely hemorrhage.
CT Head 1/12: No acute intracranial hemorrhage or mass effect. Neurology 1/25: Impression: severe hypothyroidism versus low suspicion of Hashimoto encephalopathy (given intact level of consciousness), low suspicion for autoimmune encephalitis given the known metabolic derangements  Internal medicine: Pt on 1:1 for psychosis with high aggression. Does not have capacity to refuse meds or leave. R/O Gram positive bacterial infection -->Blood cx obtained on 1/25 no growth X 5 days.  Rheum following: acute metabolic encephalopathy =likely 2/2 severe hypothyroid state given elevated TSH. c/w IV synthroid per endo. suggested MRI if MRA/MRV negative. +LP. 2/3: LP and MR imaging studies pending ethics re-eval. Ethics consult initiated 2/4 to assist with shared decision making regarding plan of care for a patient with encephalopathy, hyperglycemia and severe hypothyroidism given that patient is actively refusing certain treatments, imaging and medical interventions. In this case, the patient requires continued medical management using restraints (chemical or physical) when necessary to prevent life threatening complications and accurate prognostication as well as attempting to restore capacity. In this case, MRI and Lumbar Puncture under anesthesia would be permissible for prognostication of the patient’s altered mental status.  2/6: Lumbar puncture performed & MRI brain and MRA/MRA brain and neck. MRI Head 2/6: Right holohemispheric subdural hematoma subacute by signal characteristics measuring 3 mm in thickness and not clearly identified on the patient's prior CT 1/12/2020. No acute/subacute infarct or abnormal nchymal/leptomeningeal enhancement. No Neurosurgical intervention. RRT 2/12: Patient taken emergently to CT scan which Asymetric high density involving the right transverse sinus, which is new when compared to prior brain ct 1/12/20. Findings may represent thrombosis and less likely hemorrhage. Internal medicine suggesting PEG due to catatonic state - pt son refused

## 2020-02-27 NOTE — SWALLOW BEDSIDE ASSESSMENT ADULT - SWALLOW EVAL: DIAGNOSIS
Pt seen bedside for swallow re-evaluation. Dysphagia screen passed 2/26. Pt presents with a mild oral stage dysphagia, likely negatively impacted by mostly missing dentition. Pt tolerated soft solids with mild delay oral prep/transit without overt signs or symptoms of penetration or aspiration on soft solids and thin liquids. Pt refused to trial purees or allow this clinician to palpate neck musculature.

## 2020-02-27 NOTE — PROGRESS NOTE ADULT - ASSESSMENT
64f hx functional paraplegia bedbound, hypothyroidism, pulmonary HTN, DM on insulin, COPD/CHRIS on noctournal CPAP and 3L home), RA on chronic prednisone, chronic tremors, recent hospitalization for SBO with course c/b ex-lap, respiratory failure requiring intubation, and Enterococci bacteremia admitted for AMS, found to be severely hypothyroid with Candida UTI. Palliative consulted for GOC.

## 2020-02-27 NOTE — SWALLOW BEDSIDE ASSESSMENT ADULT - SLP GENERAL OBSERVATIONS
Pt encountered bedside, AA&Ox1 (person). Pt able to express simple wants/needs and c/o pain without difficulty. Pt complied with portions of evaluation allowing SLP to administer certain PO consistencies (soft solids and thin liquids only). Pt demonstrated significant verbal perseverations on finding her telephone and requesting for the medical team to address familial concerns (re: Health care proxy - son Norris vs. daughter Heavenly). Pt provided with emotional support and relayed all concerns to RN Sammie as well as Resident Toan. Pt left upright in bed, without distress. Pt encountered bedside, AA&Ox1 (person). Pt able to express simple wants/needs and c/o pain without difficulty. Pt complied with portions of evaluation allowing SLP to administer certain PO consistencies (soft solids and thin liquids only). Pt demonstrated significant verbal perseverations on finding her telephone and requesting for the medical team to address familial concerns (re: Health care proxy - son Norris vs. daughter Heavenly). Pt provided with emotional support and relayed all concerns to ESVIN Cochran as well as Resident Toan. RN reported that hospital course has been complicated by catatonia with pt previously given Depakote. Pt now on ativan with significant improvement in arousal/alertness. Pt left upright in bed, without distress.

## 2020-02-27 NOTE — PROGRESS NOTE ADULT - PROBLEM SELECTOR PLAN 10
- DVT ppx: HSQ q8hrs   - Diet: NPO. PO pending speech and swallow eval   - Dispo: [facility]   Transitions of Care Status:  1.  Name of PCP:  Kristie Nails MD (PCP)  2.  PCP Contacted on Admission: [ ] Y    [x] N    3.  PCP contacted at Discharge: [ ] Y    [ ] N    [ ] N/A  4.  Post-Discharge Appointment Date and Location:  5.  Summary of Handoff given to PCP: - DVT ppx: HSQ q8hrs   - Diet: speech soft diet  - Dispo: [facility] pending repeat PT eval  Transitions of Care Status:  1.  Name of PCP:  Kristie Nails MD (PCP)  2.  PCP Contacted on Admission: [ ] Y    [x] N    3.  PCP contacted at Discharge: [ ] Y    [ ] N    [ ] N/A  4.  Post-Discharge Appointment Date and Location:  5.  Summary of Handoff given to PCP:
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
## 2020-02-27 NOTE — PROGRESS NOTE ADULT - PROBLEM SELECTOR PLAN 1
-test BG AC/HS  -Started on Lantus 20 units QAM today. can c/w with this dose for now and trend glucose values.   -C/w Humalog moderate correction scale AC and Mod HS scale  -will start low dose premeal if BG values >180mg/dl today  Discharge plan: basal/bolus  -f/u outpt w/Dr Saul  --Plan discussed with pt/team  pager: [phone]

## 2020-02-27 NOTE — SWALLOW BEDSIDE ASSESSMENT ADULT - SWALLOW EVAL: BUCCAL STRENGTH AND MOBILITY
44F PMH IVDA, alcohol abuse, hx of pancreatitis, alcohol hepatitis, alcohol withdrawal, left frontoparietal subdural hematoma 2008 without need for neurosurgical intervention, bacterial vaginitis here with severe sepsis, septic shock, MRSA bacteremia, likely septic emboli, PNA, UTI, ARF with ATN, lactic acidosis, acute metabolic encephelopathy, likely endocarditis.      1. NEURO  - CT head without acute pathology  - encephalopathy and poor mental status may be in setting of sepsis and underlying bacteremia/PNA/UTI  - monitor for withdrawal symptoms  - daily sedation vacation      2. CV  - TTE without definitive valvular vegetation, however unable to clearly visualize tricuspid valve  - will have cardiology evaluate for LIZ  - d/c dopamine and change to levophed   - no further bradycardic episode      3. PULM  - RLL PNA along with cavitary lesions noted on CT chest likely due to septic emboli due to staph infection which can cause cavitary lesions  - follow up sputum culture  - tachypnea, likely due to sepsis, lactic acidosis, and fevers  - now intubated on mechanical ventilation  - daily wean as tolerates    4. RENAL  - Cr slowly downtrending with IVF   - ARF with ATN likely in setting of sepsis, hypovolemia, hypotension and with underlying UTI  - supplement hypokalemia  - appreciate renal follow up    5. HEME  - thrombocytopenia possibly due to sepsis as well as chronic alcohol abuse  - cont to monitor counts and for bleed    6. ID  - MRSA bacteremia with likely septic emboli to lungs with PNA and cavitary lesion  - follow up sputum cx  - d/c daptomycin that was started overnight, cont with vanco by level due to ARF  - also with UTI: levaquin does not cover urine well, change to cipro, monitor Qtc  - follow up culture sensitivities  - RVP negative  - concern for endocarditis with IVDU, will d/w cardiology LIZ  - ID eval    7. GEN  - boyfriend updated in regards to changes in clinical condition  - pt has a daughter but he does not know contact info for family  - pt lives by self, significant other lives in [place]  - social work eval    Critical Care Time: 75 minutes Unable to assess - pt non-compliant with exam.

## 2020-02-27 NOTE — PROGRESS NOTE ADULT - PROBLEM SELECTOR PLAN 1
- possibly catatonic, vast improvement after ativan  - continue to monitor  - pulled out NGT and tolerating regular diet

## 2020-02-27 NOTE — PROGRESS NOTE ADULT - PROBLEM SELECTOR PLAN 2
SIRS criteria on 2/14 w/ Tmax 100.5 and tachycardia. UA w/ post nitrites, LE, many bacteria. UCx E.Coli pansensitive s/p x 4 days zosyn (2/14-2/18)  -positive UA 2/20 zosyn x 3 days, however urine cx positive for candida only therefore zosyn d/c  - c/w fluconazole for 5-7 days per ID but unlikely contributing to AMS  -RVP negative, procal borderline negative resolved, not contributing to AMS  s/p course of fluconazole and zosyn per ID  -afebrile

## 2020-02-27 NOTE — SWALLOW BEDSIDE ASSESSMENT ADULT - ASR SWALLOW RECOMMEND DIAG
instrumental examination not clinically warranted
instrumental examination not clinically appropriate

## 2020-02-27 NOTE — PROGRESS NOTE ADULT - PROBLEM SELECTOR PLAN 1
Unclear etiology. Per family this is rapidly progressing. Patient had some minor memory impairments roughly 30 days prior to admission when she started suffering from memory loss and paranoid delusions.   -Ddx includes psychosis/delirium 2/2 hypothyroidism. PE significant for myxedema. , free T4 0.3. Endocrine following. Now on IV synthroid. Ddx also includes FT dementia   -MRI brain 3mm right sided SDH no neurosurgical intervention   -LP 2/6 CSF studies wnl.   -Paraneoplastic panel neg  -VEEG no seizure activity   - rheumatology state rheum origin less likely given neg for inflammatory changes; serum ALESSIA, ZAFAR, ribosomal P, dsdna neg, NMDRAb neg   -repeat head CT no acute findings 2/20  -2/23 T4 5.5 wnl, T3 53 LOW  -lead level negative   -Ceruloplasmin wnl  -neuro reconsulted 2/26 regarding most likely diagnosis  -now improved mental status s/p Ativan challenge 2/26  -Ativan 1mg TID   -haldol prn for agitation Unclear etiology. Per family this is rapidly progressing. Patient had some minor memory impairments roughly 30 days prior to admission when she started suffering from memory loss and paranoid delusions.   -more recently was in a catatonic state which improved with ativan on 2/26  -per neuro and psych, c/w Ativan 1mg TID, son agreeable   -haldol prn for agitation  -Ddx includes psychosis/delirium 2/2 hypothyroidism. PE significant for myxedema. , free T4 0.3. Endocrine following. Now on IV synthroid. Ddx also includes FT dementia   -MRI brain 3mm right sided SDH no neurosurgical intervention   -LP 2/6 CSF studies wnl.   -Paraneoplastic panel neg  -VEEG no seizure activity   - rheumatology state rheum origin less likely given neg for inflammatory changes; serum ALESSIA, ZAFAR, ribosomal P, dsdna neg, NMDRAb neg   -repeat head CT no acute findings 2/20  -2/23 T4 5.5 wnl, T3 53 LOW  -lead level negative   -Ceruloplasmin wnl  -neuro reconsulted 2/26 for followup

## 2020-02-27 NOTE — PROGRESS NOTE ADULT - ASSESSMENT
64F w/ a PMHx arthritis, hypothyroidism, pulmonary HTN, DM on insulin, HTN, COPD/CHRIS on noctournal CPAP and 3L home), RA on chronic prednisone, chronic tremors, SBO s/p ex lap with lysis of adhesions c/b evisceration p/w agitation 3 weeks of unclear etiology, found to be significantly hypothyroid (), MRI brain found to have small right sided SDH, hospital course c/b catatonia now Ativan challenge

## 2020-02-27 NOTE — SWALLOW BEDSIDE ASSESSMENT ADULT - ASR SWALLOW ASPIRATION MONITOR
change of breathing pattern/throat clearing/cough/fever/gurgly voice/pneumonia/upper respiratory infection

## 2020-02-27 NOTE — PROGRESS NOTE ADULT - PROBLEM SELECTOR PLAN 5
-Continuous tube feeds NG tube 2/20 - 2/26. patient pulled NG tube 2/26 overnight.  -f/u speech and swallow to restart PO   -NPH discontinued now patient off tube feeds   -lantus 20U qdaily  -moderate ISS   -fingersticks q6hrs  -c/w moderate ISS, endocrine adjusting -Continuous tube feeds NG tube 2/20 - 2/26. patient pulled NG tube 2/26 overnight.  -f/u speech and swallow to restart PO   -NPH discontinued now patient off tube feeds   -lantus 20U qdaily  -moderate ISS, monitor FS  -fingersticks q6hrs  -c/w moderate ISS, endocrine adjusting

## 2020-02-27 NOTE — SWALLOW BEDSIDE ASSESSMENT ADULT - ORAL PREPARATORY PHASE
+minimal anterior loss of bolus during biting/tearing - likely impacted by missing dentition Within functional limits

## 2020-02-27 NOTE — PROGRESS NOTE ADULT - SUBJECTIVE AND OBJECTIVE BOX
CC: F/U for AMS    Saw/spoke to patient. Patient more awake today, compared to prior (on my first eval was lethargic). Agitated, and uncooperative.    Allergies  Depakote (Other)  Seroquel (Other (Severe))    ANTIMICROBIALS:  fluconAZOLE   40 mG/mL Suspension 200 daily    PE:    Vital Signs Last 24 Hrs  T(C): 37.1 (27 Feb 2020 04:22), Max: 37.1 (26 Feb 2020 13:49)  T(F): 98.8 (27 Feb 2020 04:22), Max: 98.8 (26 Feb 2020 22:33)  HR: 105 (27 Feb 2020 06:23) (103 - 106)  BP: 139/82 (27 Feb 2020 06:23) (138/81 - 162/98)  RR: 18 (27 Feb 2020 04:22) (18 - 20)  SpO2: 92% (27 Feb 2020 04:22) (92% - 97%)    Gen: AOx3, NAD, agitated, uncooperative, does not want to be examined  Refuses further exam    LABS:                        9.3    10.48 )-----------( 346      ( 27 Feb 2020 07:12 )             32.8     02-27    142  |  100  |  16  ----------------------------<  113<H>  3.7   |  26  |  0.83    Ca    9.2      27 Feb 2020 07:12  Phos  3.0     02-27  Mg     1.9     02-27    MICROBIOLOGY:    .Blood Blood-Peripheral  02-20-20   No growth at 5 days.    .Urine Catheterized  02-20-20   >100,000 CFU/ml Presumptive Candida albicans "Susceptibilities not  performed"    .Blood Blood-Venous  02-20-20   No growth at 5 days.  --  --    Rapid RVP Result: NotDetec (02-25 @ 14:48)    (otherwise reviewed)    RADIOLOGY:    2/26 CXR:    IMPRESSION:   Enteric catheter is present within stomach.    Clear lungs.

## 2020-02-27 NOTE — PROGRESS NOTE ADULT - SUBJECTIVE AND OBJECTIVE BOX
Diabetes Follow up note:    Chief complaint: f/u T2DM w/steroid induced hyperglycemia.     Interval Hx: Pt given Ativan 1mg and pt woke up and no longer catatonic. Pt self-removed NGT tube. Passed swallow eval overnight and restarted on PO diet. Pt seen at bedside. Pt upset regarding being in hospital and wants to see Dr Mccann. Pt asking to speak with someone regarding signing herself out and feels she is being held against her will.     Review of Systems:  General: as above.   GI: Tolerating POs. Denies N/V/D/Abd pain  CV: Denies CP/SOB  ENDO: No S&Sx of hypoglycemia  MEDS:    insulin glargine Injectable (LANTUS) 20 Unit(s) SubCutaneous <User Schedule>  insulin lispro (HumaLOG) corrective regimen sliding scale   SubCutaneous three times a day before meals  insulin lispro (HumaLOG) corrective regimen sliding scale   SubCutaneous at bedtime  levothyroxine Injectable 87.5 MICROGram(s) IV Push at bedtime  methylPREDNISolone sodium succinate Injectable 12.5 milliGRAM(s) IV Push daily  simvastatin 20 milliGRAM(s) Oral at bedtime    fluconAZOLE   40 mG/mL Suspension 200 milliGRAM(s) Enteral Tube daily    Allergies    Depakote (Other)  Seroquel (Other (Severe))      PE:  General: Female lying in bed. NAD.   Vital Signs Last 24 Hrs  T(C): 37.1 (27 Feb 2020 04:22), Max: 37.1 (26 Feb 2020 13:49)  T(F): 98.8 (27 Feb 2020 04:22), Max: 98.8 (26 Feb 2020 22:33)  HR: 105 (27 Feb 2020 06:23) (103 - 106)  BP: 139/82 (27 Feb 2020 06:23) (138/81 - 162/98)  BP(mean): --  RR: 18 (27 Feb 2020 04:22) (18 - 20)  SpO2: 92% (27 Feb 2020 04:22) (92% - 97%)  Abd: Soft, NT,ND, Obese.   Extremities: Warm   Neuro: Awake. Ox1-2.     LABS:  POCT Blood Glucose.: 116 mg/dL (02-27-20 @ 06:13)  POCT Blood Glucose.: 168 mg/dL (02-26-20 @ 23:48)  POCT Blood Glucose.: 239 mg/dL (02-26-20 @ 18:08)  POCT Blood Glucose.: 372 mg/dL (02-26-20 @ 13:12)  POCT Blood Glucose.: 351 mg/dL (02-26-20 @ 11:36)  POCT Blood Glucose.: 164 mg/dL (02-26-20 @ 05:36)  POCT Blood Glucose.: 198 mg/dL (02-26-20 @ 00:11)  POCT Blood Glucose.: 265 mg/dL (02-25-20 @ 18:24)  POCT Blood Glucose.: 255 mg/dL (02-25-20 @ 17:59)  POCT Blood Glucose.: 413 mg/dL (02-25-20 @ 13:18)  POCT Blood Glucose.: 214 mg/dL (02-25-20 @ 05:36)  POCT Blood Glucose.: 225 mg/dL (02-24-20 @ 23:47)  POCT Blood Glucose.: 288 mg/dL (02-24-20 @ 18:08)  POCT Blood Glucose.: 374 mg/dL (02-24-20 @ 11:59)                            9.3    10.48 )-----------( 346      ( 27 Feb 2020 07:12 )             32.8       02-27    142  |  100  |  16  ----------------------------<  113<H>  3.7   |  26  |  0.83    Ca    9.2      27 Feb 2020 07:12  Phos  3.0     02-27  Mg     1.9     02-27        Thyroid Function Tests:  02-23 @ 13:25 TSH -- FreeT4 -- T3 53 Anti TPO -- Anti Thyroglobulin Ab -- TSI --  02-13 @ 09:05 TSH 17.80 FreeT4 -- T3 -- Anti TPO -- Anti Thyroglobulin Ab -- TSI --      Hemoglobin A1C, Whole Blood: 9.0 % <H> [4.0 - 5.6] (01-28-20 @ 19:30)  Hemoglobin A1C, Whole Blood: 9.2 % <H> [4.0 - 5.6] (01-27-20 @ 17:36)          Contact number: bhavna [phone] or [phone]

## 2020-02-27 NOTE — PROGRESS NOTE ADULT - SUBJECTIVE AND OBJECTIVE BOX
HPI: 64F with hypothyroidism, pulmonary HTN, DM on insulin, COPD/CHRIS on noctournal CPAP and 3L home), RA on chronic prednisone, chronic tremors, SBO s/p ex lap with lysis of adhesions (4 retention sutures) c/b eviscerated 2/2 a coughing episode s/p ex lap (6 retention sutures) c/b multifocal pneumonia, developed hypercapnic respiratory failure requiring intubation 1/6/19, s/p bronch 1/9/19, and extubated on 1/24/19, course further c/b Enterococci bacteremia. Here with AMS, unclear etiology.    INTERVAL EVENTS:  2/27: mental status improved after ativan    ADVANCE DIRECTIVES:    DNR  MOLST  [ ]  Living Will  [X]   DECISION MAKER(s):  [ ] Health Care Proxy(s)  [X] Surrogate(s): 2 children Heavenly Hernández (daughter) and Norris Rush (son) [ ] Guardian           Name(s): Phone Number(s):    BASELINE (I)ADL(s) (prior to admission):  [place]: [X]Total  [ ] Moderate [ ]Dependent    Allergies    Depakote (Other)    Intolerances    Seroquel (Other)    MEDICATIONS  (STANDING):  budesonide  80 MICROgram(s)/formoterol 4.5 MICROgram(s) Inhaler 2 Puff(s) Inhalation two times a day  dextrose 5%. 1000 milliLiter(s) (50 mL/Hr) IV Continuous <Continuous>  dextrose 50% Injectable 12.5 Gram(s) IV Push once  dextrose 50% Injectable 25 Gram(s) IV Push once  dextrose 50% Injectable 25 Gram(s) IV Push once  heparin  Injectable 5000 Unit(s) SubCutaneous every 8 hours  influenza   Vaccine 0.5 milliLiter(s) IntraMuscular once  insulin glargine Injectable (LANTUS) 20 Unit(s) SubCutaneous <User Schedule>  insulin lispro (HumaLOG) corrective regimen sliding scale   SubCutaneous three times a day before meals  insulin lispro (HumaLOG) corrective regimen sliding scale   SubCutaneous at bedtime  insulin lispro Injectable (HumaLOG) 6 Unit(s) SubCutaneous three times a day with meals  levothyroxine Injectable 87.5 MICROGram(s) IV Push at bedtime  LORazepam   Injectable 1 milliGRAM(s) IV Push <User Schedule>  losartan 25 milliGRAM(s) Oral daily  methylPREDNISolone sodium succinate Injectable 12.5 milliGRAM(s) IV Push daily  metoprolol tartrate 25 milliGRAM(s) Oral two times a day  multivitamin/minerals/iron Oral Solution (CENTRUM) 15 milliLiter(s) Oral daily  pantoprazole  Injectable 40 milliGRAM(s) IV Push daily  primidone 50 milliGRAM(s) Oral at bedtime  simvastatin 20 milliGRAM(s) Oral at bedtime  thiamine 100 milliGRAM(s) Oral daily    MEDICATIONS  (PRN):  acetaminophen  Suppository .. 650 milliGRAM(s) Rectal every 6 hours PRN Temp greater or equal to 38C (100.4F)  albuterol/ipratropium for Nebulization 3 milliLiter(s) Nebulizer every 6 hours PRN Shortness of Breath and/or Wheezing  dextrose 40% Gel 15 Gram(s) Oral once PRN Blood Glucose LESS THAN 70 milliGRAM(s)/deciLiter  glucagon  Injectable 1 milliGRAM(s) IntraMuscular once PRN Glucose <70 milliGRAM(s)/deciLiter  haloperidol    Injectable 5 milliGRAM(s) IV Push every 6 hours PRN Agitation  melatonin 3 milliGRAM(s) Oral at bedtime PRN Insomnia      PRESENT SYMPTOMS: ROS [X]Unable to obtain due to poor mentation   Source if other than patient:  [ ]Family   [ ]Team       Palliative Performance Status Version 2:         100% prior to hospitalization   [email or web address]    ROS: negative    PHYSICAL EXAM:  Vital Signs Last 24 Hrs  T(C): 37.1 (27 Feb 2020 04:22), Max: 37.1 (26 Feb 2020 13:49)  T(F): 98.8 (27 Feb 2020 04:22), Max: 98.8 (26 Feb 2020 22:33)  HR: 105 (27 Feb 2020 06:23) (103 - 106)  BP: 139/82 (27 Feb 2020 06:23) (138/81 - 162/98)  BP(mean): --  RR: 18 (27 Feb 2020 04:22) (18 - 20)  SpO2: 92% (27 Feb 2020 04:22) (92% - 97%)    Patient deferred full exam  GENERAL:  [X]Alert  [ ]Oriented  [ ]Lethargic  [ ]Cachexia  [ ]Unarousable  [ ]Verbal  [X]Non-Verbal  Behavioral:   [ ] Anxiety  [ ] Delirium [ ] Agitation [ ] Other  HEENT:  [ ]Normal   [ ]Dry mouth   [ ]ET Tube/Trach  [ ]Oral lesions  PULMONARY:   []Clear [ ]Tachypnea  [ ]Audible excessive secretions   [ ]Rhonchi        [ ]Right [ ]Left [ ]Bilateral  [ ]Crackles        [ ]Right [ ]Left [ ]Bilateral  [ ]Wheezing     [ ]Right [ ]Left [ ]Bilatera  [ ]Diminished breath sounds [ ]right [ ]left [ ]bilateral  CARDIOVASCULAR:    []Regular [ ]Irregular [ ]Tachy  [ ]Ubaldo [ ]Murmur [ ]Other  GASTROINTESTINAL:  []Soft  [ ]Distended   []+BS  []Non tender [ ]Tender  [ ]PEG [ ]OGT/ NGT  Last BM: 2/22/20  GENITOURINARY:  [ ]Normal [ ] Incontinent   [ ]Oliguria/Anuria   [ ]Antoine  MUSCULOSKELETAL:   [ ]Normal   [ ]Weakness  [ ]Bed/Wheelchair bound [ ]Edema  NEUROLOGIC:   [ ]No focal deficits  [X ]Cognitive impairment  [ ]Dysphagia [ ]Dysarthria [ ]Paresis [ ]Other: unresponsive to commands, name call; baseline tremor present  SKIN:   [ ]Normal    [ ]Rash  [ ]Pressure ulcer(s)       Present on admission [ ]y [ ]n      LABS: reviewed                                   9.3    10.48 )-----------( 346      ( 27 Feb 2020 07:12 )             32.8     02-27    142  |  100  |  16  ----------------------------<  113<H>  3.7   |  26  |  0.83    Ca    9.2      27 Feb 2020 07:12  Phos  3.0     02-27  Mg     1.9     02-27

## 2020-02-27 NOTE — PROGRESS NOTE ADULT - ASSESSMENT
63 y/o F w/h/o uncontrolled T2DM with unknown complications. Also h/o functional paraplegia, hypothyroidism, pulmonary hypertension, COPD, RA on chronic prednisone, SBO s/p ex lap with lysis of adhesions c/b multifocal pneumonia with AMS of unknown etiology w/prolonged catatonic state now improved w/ativan administration. Tube feeds now discontinued and started on PO diet. Given Lantus 20 units this AM. Will wait to evaluate PO intake prior to starting premeal insulin. BG goal (100-180mg/dl).

## 2020-02-27 NOTE — PROGRESS NOTE ADULT - PROBLEM SELECTOR PLAN 7
- Bcx + for GPR on 1/12, 1 out of 2 bottles. +Bcx from 1/12 sent to [facility] laboratories, no further speciation available.   - Blood cx obtained on 1/25 no growth X 5 days. Repeat 2/14 NGTD.   - repeat blood cultures negative - Bcx + for GPR on 1/12, 1 out of 2 bottles. +Bcx from 1/12 sent to [facility] laboratories, no further speciation available.   - Blood cx obtained on 1/25 no growth X 5 days. Repeat 2/14 NGTD.   - repeat blood cultures negative, ID following

## 2020-02-27 NOTE — PROGRESS NOTE ADULT - PROBLEM SELECTOR PLAN 2
-Pt currently on levothyroxine 87.5 mcg IVP. When tolerating POs can change to 175mcg PO daily on an empty stomach  -Re test in 4-6 weeks.    -I spent a total time of 25 mins with the patient at bedside/on unit of which more than 50% of time was spent on counseling/coordination of care.

## 2020-02-28 LAB
ANION GAP SERPL CALC-SCNC: 18 MMOL/L — HIGH (ref 5–17)
BASOPHILS # BLD AUTO: 0.06 K/UL — SIGNIFICANT CHANGE UP (ref 0–0.2)
BASOPHILS NFR BLD AUTO: 0.5 % — SIGNIFICANT CHANGE UP (ref 0–2)
BUN SERPL-MCNC: 21 MG/DL — SIGNIFICANT CHANGE UP (ref 7–23)
CALCIUM SERPL-MCNC: 9.4 MG/DL — SIGNIFICANT CHANGE UP (ref 8.4–10.5)
CHLORIDE SERPL-SCNC: 101 MMOL/L — SIGNIFICANT CHANGE UP (ref 96–108)
CO2 SERPL-SCNC: 22 MMOL/L — SIGNIFICANT CHANGE UP (ref 22–31)
CREAT SERPL-MCNC: 0.94 MG/DL — SIGNIFICANT CHANGE UP (ref 0.5–1.3)
EOSINOPHIL # BLD AUTO: 0.16 K/UL — SIGNIFICANT CHANGE UP (ref 0–0.5)
EOSINOPHIL NFR BLD AUTO: 1.4 % — SIGNIFICANT CHANGE UP (ref 0–6)
GLUCOSE BLDC GLUCOMTR-MCNC: 178 MG/DL — HIGH (ref 70–99)
GLUCOSE BLDC GLUCOMTR-MCNC: 221 MG/DL — HIGH (ref 70–99)
GLUCOSE BLDC GLUCOMTR-MCNC: 292 MG/DL — HIGH (ref 70–99)
GLUCOSE SERPL-MCNC: 118 MG/DL — HIGH (ref 70–99)
HCT VFR BLD CALC: 33.6 % — LOW (ref 34.5–45)
HGB BLD-MCNC: 9.7 G/DL — LOW (ref 11.5–15.5)
IMM GRANULOCYTES NFR BLD AUTO: 1.6 % — HIGH (ref 0–1.5)
LYMPHOCYTES # BLD AUTO: 3.77 K/UL — HIGH (ref 1–3.3)
LYMPHOCYTES # BLD AUTO: 32.7 % — SIGNIFICANT CHANGE UP (ref 13–44)
MAGNESIUM SERPL-MCNC: 2.1 MG/DL — SIGNIFICANT CHANGE UP (ref 1.6–2.6)
MCHC RBC-ENTMCNC: 28.9 GM/DL — LOW (ref 32–36)
MCHC RBC-ENTMCNC: 29.4 PG — SIGNIFICANT CHANGE UP (ref 27–34)
MCV RBC AUTO: 101.8 FL — HIGH (ref 80–100)
MONOCYTES # BLD AUTO: 1.37 K/UL — HIGH (ref 0–0.9)
MONOCYTES NFR BLD AUTO: 11.9 % — SIGNIFICANT CHANGE UP (ref 2–14)
NEUTROPHILS # BLD AUTO: 5.99 K/UL — SIGNIFICANT CHANGE UP (ref 1.8–7.4)
NEUTROPHILS NFR BLD AUTO: 51.9 % — SIGNIFICANT CHANGE UP (ref 43–77)
NRBC # BLD: 2 /100 WBCS — HIGH (ref 0–0)
PHOSPHATE SERPL-MCNC: 3 MG/DL — SIGNIFICANT CHANGE UP (ref 2.5–4.5)
PLATELET # BLD AUTO: 385 K/UL — SIGNIFICANT CHANGE UP (ref 150–400)
POTASSIUM SERPL-MCNC: 4.2 MMOL/L — SIGNIFICANT CHANGE UP (ref 3.5–5.3)
POTASSIUM SERPL-SCNC: 4.2 MMOL/L — SIGNIFICANT CHANGE UP (ref 3.5–5.3)
RBC # BLD: 3.3 M/UL — LOW (ref 3.8–5.2)
RBC # FLD: 15 % — HIGH (ref 10.3–14.5)
SODIUM SERPL-SCNC: 141 MMOL/L — SIGNIFICANT CHANGE UP (ref 135–145)
WBC # BLD: 11.54 K/UL — HIGH (ref 3.8–10.5)
WBC # FLD AUTO: 11.54 K/UL — HIGH (ref 3.8–10.5)

## 2020-02-28 PROCEDURE — 99232 SBSQ HOSP IP/OBS MODERATE 35: CPT

## 2020-02-28 PROCEDURE — 99233 SBSQ HOSP IP/OBS HIGH 50: CPT | Mod: GC

## 2020-02-28 RX ORDER — LEVOTHYROXINE SODIUM 125 MCG
87.5 TABLET ORAL ONCE
Refills: 0 | Status: COMPLETED | OUTPATIENT
Start: 2020-02-28 | End: 2020-02-28

## 2020-02-28 RX ORDER — PANTOPRAZOLE SODIUM 20 MG/1
40 TABLET, DELAYED RELEASE ORAL
Refills: 0 | Status: DISCONTINUED | OUTPATIENT
Start: 2020-02-28 | End: 2020-03-03

## 2020-02-28 RX ORDER — LEVOTHYROXINE SODIUM 125 MCG
175 TABLET ORAL DAILY
Refills: 0 | Status: DISCONTINUED | OUTPATIENT
Start: 2020-02-29 | End: 2020-03-06

## 2020-02-28 RX ORDER — LEVOTHYROXINE SODIUM 125 MCG
175 TABLET ORAL DAILY
Refills: 0 | Status: DISCONTINUED | OUTPATIENT
Start: 2020-02-28 | End: 2020-02-28

## 2020-02-28 RX ORDER — INSULIN LISPRO 100/ML
10 VIAL (ML) SUBCUTANEOUS
Refills: 0 | Status: DISCONTINUED | OUTPATIENT
Start: 2020-02-28 | End: 2020-02-29

## 2020-02-28 RX ADMIN — Medication 1 MILLIGRAM(S): at 23:17

## 2020-02-28 RX ADMIN — BUDESONIDE AND FORMOTEROL FUMARATE DIHYDRATE 2 PUFF(S): 160; 4.5 AEROSOL RESPIRATORY (INHALATION) at 17:31

## 2020-02-28 RX ADMIN — INSULIN GLARGINE 20 UNIT(S): 100 INJECTION, SOLUTION SUBCUTANEOUS at 05:37

## 2020-02-28 RX ADMIN — LOSARTAN POTASSIUM 25 MILLIGRAM(S): 100 TABLET, FILM COATED ORAL at 05:41

## 2020-02-28 RX ADMIN — Medication 1 MILLIGRAM(S): at 08:29

## 2020-02-28 RX ADMIN — Medication 10 UNIT(S): at 17:27

## 2020-02-28 RX ADMIN — HEPARIN SODIUM 5000 UNIT(S): 5000 INJECTION INTRAVENOUS; SUBCUTANEOUS at 23:17

## 2020-02-28 RX ADMIN — HEPARIN SODIUM 5000 UNIT(S): 5000 INJECTION INTRAVENOUS; SUBCUTANEOUS at 17:26

## 2020-02-28 RX ADMIN — Medication 25 MILLIGRAM(S): at 05:41

## 2020-02-28 RX ADMIN — HEPARIN SODIUM 5000 UNIT(S): 5000 INJECTION INTRAVENOUS; SUBCUTANEOUS at 05:37

## 2020-02-28 RX ADMIN — PRIMIDONE 50 MILLIGRAM(S): 250 TABLET ORAL at 23:18

## 2020-02-28 RX ADMIN — Medication 1 MILLIGRAM(S): at 15:39

## 2020-02-28 RX ADMIN — Medication 87.5 MICROGRAM(S): at 17:45

## 2020-02-28 RX ADMIN — PANTOPRAZOLE SODIUM 40 MILLIGRAM(S): 20 TABLET, DELAYED RELEASE ORAL at 12:23

## 2020-02-28 RX ADMIN — Medication 1 MILLIGRAM(S): at 12:19

## 2020-02-28 RX ADMIN — Medication 12.5 MILLIGRAM(S): at 05:41

## 2020-02-28 RX ADMIN — Medication 6: at 12:18

## 2020-02-28 RX ADMIN — Medication 4: at 17:27

## 2020-02-28 RX ADMIN — Medication 6 UNIT(S): at 08:31

## 2020-02-28 RX ADMIN — SIMVASTATIN 20 MILLIGRAM(S): 20 TABLET, FILM COATED ORAL at 23:18

## 2020-02-28 RX ADMIN — Medication 2: at 08:31

## 2020-02-28 RX ADMIN — Medication 6 UNIT(S): at 12:18

## 2020-02-28 NOTE — PROGRESS NOTE ADULT - PROBLEM SELECTOR PLAN 1
-test BG AC/HS  -C/w Lantus 20 units QAM.  -Increase Humalog ac meals to 10 units. Pt previously up to 18 units ac meals when eating.  -C/w Humalog moderate correction scale AC and Mod HS scale  Discharge plan: basal/bolus  -Even though pt is alert and talking now she still unable to care for her DM and will need someone else to do insulin and DM care.   -f/u outpt w/Dr Saul    --Plan discussed with pt/team/RN.   Contact info: [phone] (24/7). pager [phone]

## 2020-02-28 NOTE — PROGRESS NOTE ADULT - PROBLEM SELECTOR PLAN 5
-Continuous tube feeds NG tube 2/20 - 2/26. patient pulled NG tube 2/26 overnight.  -f/u speech and swallow to restart PO   -NPH discontinued now patient off tube feeds   -lantus 20U qdaily  -humalog 6U pre-meal  -c/w moderate ISS, endocrine adjusting

## 2020-02-28 NOTE — PROGRESS NOTE ADULT - PROBLEM SELECTOR PLAN 10
- DVT ppx: HSQ q8hrs   - Diet: speech soft diet  - Dispo: [facility] pending repeat PT eval. Family discussion pending   Transitions of Care Status:  1.  Name of PCP:  Kristie Nails MD (PCP)  2.  PCP Contacted on Admission: [ ] Y    [x] N    3.  PCP contacted at Discharge: [ ] Y    [ ] N    [ ] N/A  4.  Post-Discharge Appointment Date and Location:  5.  Summary of Handoff given to PCP:

## 2020-02-28 NOTE — PROGRESS NOTE ADULT - SUBJECTIVE AND OBJECTIVE BOX
DIABETES FOLLOW UP NOTE: Saw pt earlier today  INTERVAL HX: 65 y/o F w/h/o uncontrolled T2DM with unknown complications. Also h/o functional paraplegia, hypothyroidism, pulmonary hypertension, COPD, RA on chronic prednisone, SBO s/p ex lap with lysis of adhesions c/b multifocal pneumonia with AMS of unknown etiology. Pt has been alert but unresponsive for several days on catatonic state that resolved with ativan administration started yesterday. Tube feeds discontinued and started on PO diet. Glycemic control above goal while on present insulin doses likely due to improved PO intake. No hypoglycemia. Restarted PO synthroid and Prednisone.       Review of Systems:  general: Asking for food at time of visit and states she is going home today  Cardiovascular: No chest pain, palpitations  Respiratory: No SOB, no cough  GI: No nausea, vomiting, abdominal pain  Endocrine: no polyuria, polydipsia or S&Sx of hypoglycemia    Allergies    Depakote (Other)  Seroquel (Other (Severe))    Intolerances      MEDICATIONS:  insulin glargine Injectable (LANTUS) 20 Unit(s) SubCutaneous <User Schedule>  insulin lispro (HumaLOG) corrective regimen sliding scale   SubCutaneous three times a day before meals  insulin lispro (HumaLOG) corrective regimen sliding scale   SubCutaneous at bedtime  insulin lispro Injectable (HumaLOG) 6 Unit(s) SubCutaneous three times a day with meals  levothyroxine 175 MICROGram(s) Oral daily  predniSONE   Tablet 15 milliGRAM(s) Oral daily  simvastatin 20 milliGRAM(s) Oral at bedtime        PHYSICAL EXAM:  VITALS: T(C): 36.9 (02-28-20 @ 14:49)  T(F): 98.5 (02-28-20 @ 14:49), Max: 98.5 (02-27-20 @ 17:59)  HR: 115 (02-28-20 @ 14:49) (94 - 115)  BP: 92/55 (02-28-20 @ 14:49) (92/55 - 139/91)  RR:  (16 - 18)  SpO2:  (94% - 95%)  Wt(kg): --  GENERAL: Female laying in bed in NAD  Abdomen: Soft, nontender, non distended, obese  Extremities: Warm, no edema in all 4 exts  NEURO: Alert able to answer to yes/no questions but can't carry out a conversation. Keeps saying she wants food    LABS:  POCT Blood Glucose.: 292 mg/dL (02-28-20 @ 12:10)  POCT Blood Glucose.: 178 mg/dL (02-28-20 @ 08:24)  POCT Blood Glucose.: 148 mg/dL (02-27-20 @ 22:28)  POCT Blood Glucose.: 232 mg/dL (02-27-20 @ 17:32)  POCT Blood Glucose.: 351 mg/dL (02-27-20 @ 12:03)  POCT Blood Glucose.: 116 mg/dL (02-27-20 @ 06:13)  POCT Blood Glucose.: 168 mg/dL (02-26-20 @ 23:48)  POCT Blood Glucose.: 239 mg/dL (02-26-20 @ 18:08)  POCT Blood Glucose.: 372 mg/dL (02-26-20 @ 13:12)  POCT Blood Glucose.: 351 mg/dL (02-26-20 @ 11:36)  POCT Blood Glucose.: 164 mg/dL (02-26-20 @ 05:36)  POCT Blood Glucose.: 198 mg/dL (02-26-20 @ 00:11)  POCT Blood Glucose.: 265 mg/dL (02-25-20 @ 18:24)  POCT Blood Glucose.: 255 mg/dL (02-25-20 @ 17:59)                            9.7    11.54 )-----------( 385      ( 28 Feb 2020 07:27 )             33.6       02-28    141  |  101  |  21  ----------------------------<  118<H>  4.2   |  22  |  0.94    EGFR if : 75  EGFR if non : 65    Ca    9.4      02-28  Mg     2.1     02-28  Phos  3.0     02-28        Thyroid Function Tests:  02-23 @ 13:25 TSH -- FreeT4 -- T3 53 Anti TPO -- Anti Thyroglobulin Ab -- TSI --  02-13 @ 09:05 TSH 17.80 FreeT4 -- T3 -- Anti TPO -- Anti Thyroglobulin Ab -- TSI --      Hemoglobin A1C, Whole Blood: 9.0 % <H> [4.0 - 5.6] (01-28-20 @ 19:30)  Hemoglobin A1C, Whole Blood: 9.2 % <H> [4.0 - 5.6] (01-27-20 @ 17:36)

## 2020-02-28 NOTE — PROGRESS NOTE ADULT - SUBJECTIVE AND OBJECTIVE BOX
***************************************************************  Logan Bryan, PGY1  Internal Medicine   pager: [phone]  ***************************************************************    AFTAB BASS  64y  MRN: [id number]    Patient is a 64y old  Female who presents with a chief complaint of change in mental status (27 Feb 2020 13:33)      Subjective: refused PO meds overnight otherwise no acute events. This morning denies fever, CP, SOB, abn pain, N/V, dysuria. Tolerating diet.      MEDICATIONS  (STANDING):  budesonide  80 MICROgram(s)/formoterol 4.5 MICROgram(s) Inhaler 2 Puff(s) Inhalation two times a day  dextrose 5%. 1000 milliLiter(s) (50 mL/Hr) IV Continuous <Continuous>  dextrose 50% Injectable 12.5 Gram(s) IV Push once  dextrose 50% Injectable 25 Gram(s) IV Push once  dextrose 50% Injectable 25 Gram(s) IV Push once  heparin  Injectable 5000 Unit(s) SubCutaneous every 8 hours  influenza   Vaccine 0.5 milliLiter(s) IntraMuscular once  insulin glargine Injectable (LANTUS) 20 Unit(s) SubCutaneous <User Schedule>  insulin lispro (HumaLOG) corrective regimen sliding scale   SubCutaneous three times a day before meals  insulin lispro (HumaLOG) corrective regimen sliding scale   SubCutaneous at bedtime  insulin lispro Injectable (HumaLOG) 6 Unit(s) SubCutaneous three times a day with meals  levothyroxine Injectable 87.5 MICROGram(s) IV Push at bedtime  LORazepam   Injectable 1 milliGRAM(s) IV Push <User Schedule>  losartan 25 milliGRAM(s) Oral daily  methylPREDNISolone sodium succinate Injectable 12.5 milliGRAM(s) IV Push daily  metoprolol tartrate 25 milliGRAM(s) Oral two times a day  multivitamin/minerals/iron Oral Solution (CENTRUM) 15 milliLiter(s) Oral daily  pantoprazole  Injectable 40 milliGRAM(s) IV Push daily  primidone 50 milliGRAM(s) Oral at bedtime  simvastatin 20 milliGRAM(s) Oral at bedtime  thiamine 100 milliGRAM(s) Oral daily    MEDICATIONS  (PRN):  acetaminophen  Suppository .. 650 milliGRAM(s) Rectal every 6 hours PRN Temp greater or equal to 38C (100.4F)  albuterol/ipratropium for Nebulization 3 milliLiter(s) Nebulizer every 6 hours PRN Shortness of Breath and/or Wheezing  dextrose 40% Gel 15 Gram(s) Oral once PRN Blood Glucose LESS THAN 70 milliGRAM(s)/deciLiter  glucagon  Injectable 1 milliGRAM(s) IntraMuscular once PRN Glucose <70 milliGRAM(s)/deciLiter  haloperidol    Injectable 5 milliGRAM(s) IV Push every 6 hours PRN Agitation  melatonin 3 milliGRAM(s) Oral at bedtime PRN Insomnia      Objective:    Vitals: Vital Signs Last 24 Hrs  T(C): 36.8 (02-27-20 @ 20:49), Max: 36.9 (02-27-20 @ 17:59)  T(F): 98.3 (02-27-20 @ 20:49), Max: 98.5 (02-27-20 @ 17:59)  HR: 94 (02-27-20 @ 20:49) (94 - 109)  BP: 131/91 (02-27-20 @ 20:49) (131/91 - 139/91)  BP(mean): --  RR: 17 (02-27-20 @ 20:49) (16 - 17)  SpO2: 95% (02-27-20 @ 20:49) (94% - 95%)            I&O's Summary    27 Feb 2020 07:01  -  28 Feb 2020 07:00  --------------------------------------------------------  IN: 600 mL / OUT: 0 mL / NET: 600 mL      PHYSICAL EXAM:  GENERAL: NAD, obese  HEAD:  Atraumatic, Normocephalic.  EYES: EOMI, PERRLA, conjunctiva and sclera clear  NECK: Supple, No JVD  CHEST/LUNG: Clear to auscultation bilaterally anteriorly; No wheeze  HEART: Regular rate and rhythm;   ABDOMEN: Soft, Nontender, Nondistended; Bowel sounds present. midline surgical scar with dressing in place c/d/i  EXTREMITIES:  2+ Peripheral Pulses, No clubbing, cyanosis, or edema  PSYCH: AAOx0  NEUROLOGY: non-focal, nonverbal  SKIN: edematous, no rashes or skin lesions     LABS:  02-27    142  |  100  |  16  ----------------------------<  113<H>  3.7   |  26  |  0.83  02-26    143  |  103  |  17  ----------------------------<  167<H>  4.0   |  26  |  0.76    Ca    9.2      27 Feb 2020 07:12  Ca    9.7      26 Feb 2020 06:46  Phos  3.0     02-27  Mg     1.9     02-27                                                9.3    10.48 )-----------( 346      ( 27 Feb 2020 07:12 )             32.8                         9.7    13.14 )-----------( 409      ( 26 Feb 2020 06:46 )             31.4     CAPILLARY BLOOD GLUCOSE      POCT Blood Glucose.: 148 mg/dL (27 Feb 2020 22:28)  POCT Blood Glucose.: 232 mg/dL (27 Feb 2020 17:32)  POCT Blood Glucose.: 351 mg/dL (27 Feb 2020 12:03)      RADIOLOGY & ADDITIONAL TESTS:    Imaging Personally Reviewed:  [x ] YES  [ ] NO    Consultants involved in case:   Consultant(s) Notes Reviewed:  [ x] YES  [ ] NO:   Care Discussed with Consultants/Other Providers [x ] YES  [ ] NO ***************************************************************  Logan Bryan, PGY1  Internal Medicine   pager: [phone]  ***************************************************************    AFTAB BASS  64y  MRN: [id number]    Patient is a 64y old  Female who presents with a chief complaint of change in mental status (27 Feb 2020 13:33)      Subjective: refused PO meds overnight otherwise no acute events but then took meds later on. Denies compliants and says she wants to go home.     MEDICATIONS  (STANDING):  budesonide  80 MICROgram(s)/formoterol 4.5 MICROgram(s) Inhaler 2 Puff(s) Inhalation two times a day  dextrose 5%. 1000 milliLiter(s) (50 mL/Hr) IV Continuous <Continuous>  dextrose 50% Injectable 12.5 Gram(s) IV Push once  dextrose 50% Injectable 25 Gram(s) IV Push once  dextrose 50% Injectable 25 Gram(s) IV Push once  heparin  Injectable 5000 Unit(s) SubCutaneous every 8 hours  influenza   Vaccine 0.5 milliLiter(s) IntraMuscular once  insulin glargine Injectable (LANTUS) 20 Unit(s) SubCutaneous <User Schedule>  insulin lispro (HumaLOG) corrective regimen sliding scale   SubCutaneous three times a day before meals  insulin lispro (HumaLOG) corrective regimen sliding scale   SubCutaneous at bedtime  insulin lispro Injectable (HumaLOG) 6 Unit(s) SubCutaneous three times a day with meals  levothyroxine Injectable 87.5 MICROGram(s) IV Push at bedtime  LORazepam   Injectable 1 milliGRAM(s) IV Push <User Schedule>  losartan 25 milliGRAM(s) Oral daily  methylPREDNISolone sodium succinate Injectable 12.5 milliGRAM(s) IV Push daily  metoprolol tartrate 25 milliGRAM(s) Oral two times a day  multivitamin/minerals/iron Oral Solution (CENTRUM) 15 milliLiter(s) Oral daily  pantoprazole  Injectable 40 milliGRAM(s) IV Push daily  primidone 50 milliGRAM(s) Oral at bedtime  simvastatin 20 milliGRAM(s) Oral at bedtime  thiamine 100 milliGRAM(s) Oral daily    MEDICATIONS  (PRN):  acetaminophen  Suppository .. 650 milliGRAM(s) Rectal every 6 hours PRN Temp greater or equal to 38C (100.4F)  albuterol/ipratropium for Nebulization 3 milliLiter(s) Nebulizer every 6 hours PRN Shortness of Breath and/or Wheezing  dextrose 40% Gel 15 Gram(s) Oral once PRN Blood Glucose LESS THAN 70 milliGRAM(s)/deciLiter  glucagon  Injectable 1 milliGRAM(s) IntraMuscular once PRN Glucose <70 milliGRAM(s)/deciLiter  haloperidol    Injectable 5 milliGRAM(s) IV Push every 6 hours PRN Agitation  melatonin 3 milliGRAM(s) Oral at bedtime PRN Insomnia      Objective:    Vitals: Vital Signs Last 24 Hrs  T(C): 36.8 (02-27-20 @ 20:49), Max: 36.9 (02-27-20 @ 17:59)  T(F): 98.3 (02-27-20 @ 20:49), Max: 98.5 (02-27-20 @ 17:59)  HR: 94 (02-27-20 @ 20:49) (94 - 109)  BP: 131/91 (02-27-20 @ 20:49) (131/91 - 139/91)  BP(mean): --  RR: 17 (02-27-20 @ 20:49) (16 - 17)  SpO2: 95% (02-27-20 @ 20:49) (94% - 95%)            I&O's Summary    27 Feb 2020 07:01  -  28 Feb 2020 07:00  --------------------------------------------------------  IN: 600 mL / OUT: 0 mL / NET: 600 mL      PHYSICAL EXAM:  GENERAL: NAD, obese  HEAD:  Atraumatic, Normocephalic.  CHEST/LUNG: Clear to auscultation bilaterally anteriorly;   HEART: Regular rate and rhythm;   ABDOMEN: Soft, Nontender, Nondistended; Bowel sounds present. midline surgical scar with dressing in place c/d/i  EXTREMITIES:  2+ Peripheral Pulses, No clubbing, cyanosis, or edema  PSYCH: AAOx1  NEUROLOGY: non-focal,  SKIN: edematous, no rashes or skin lesions     LABS:  02-27    142  |  100  |  16  ----------------------------<  113<H>  3.7   |  26  |  0.83  02-26    143  |  103  |  17  ----------------------------<  167<H>  4.0   |  26  |  0.76    Ca    9.2      27 Feb 2020 07:12  Ca    9.7      26 Feb 2020 06:46  Phos  3.0     02-27  Mg     1.9     02-27                                                9.3    10.48 )-----------( 346      ( 27 Feb 2020 07:12 )             32.8                         9.7    13.14 )-----------( 409      ( 26 Feb 2020 06:46 )             31.4     CAPILLARY BLOOD GLUCOSE      POCT Blood Glucose.: 148 mg/dL (27 Feb 2020 22:28)  POCT Blood Glucose.: 232 mg/dL (27 Feb 2020 17:32)  POCT Blood Glucose.: 351 mg/dL (27 Feb 2020 12:03)      RADIOLOGY & ADDITIONAL TESTS:    Imaging Personally Reviewed:  [x ] YES  [ ] NO    Consultants involved in case:   Consultant(s) Notes Reviewed:  [ x] YES  [ ] NO:   Care Discussed with Consultants/Other Providers [x ] YES  [ ] NO

## 2020-02-28 NOTE — PROGRESS NOTE ADULT - PROBLEM SELECTOR PLAN 3
-T4 wnl  -T3 LOW 53  -c/w synthroid 87.5 mg IV, per endo no change in meds, outpatient f/u -T4 wnl  -T3 LOW 53  -last day synthroid 87.5 mg IV, per endo  -PO synthroid 175mcg qdaily starting 2/28

## 2020-02-28 NOTE — PROGRESS NOTE ADULT - PROBLEM SELECTOR PLAN 7
- Bcx + for GPR on 1/12, 1 out of 2 bottles. +Bcx from 1/12 sent to [facility] laboratories, no further speciation available.   - Blood cx obtained on 1/25 no growth X 5 days. Repeat 2/14 NGTD.   - repeat blood cultures negative, ID following

## 2020-02-28 NOTE — PROGRESS NOTE ADULT - PROBLEM SELECTOR PLAN 1
Unclear etiology. Per family this is rapidly progressing. Patient had some minor memory impairments roughly 30 days prior to admission when she started suffering from memory loss and paranoid delusions.   -more recently was in a catatonic state which improved with ativan on 2/26  -per neuro and psych, c/w Ativan 1mg TID, son agreeable   -haldol prn for agitation  -Ddx includes psychosis/delirium 2/2 hypothyroidism. PE significant for myxedema. , free T4 0.3. Endocrine following. Now on IV synthroid. Ddx also includes FT dementia   -MRI brain 3mm right sided SDH no neurosurgical intervention   -LP 2/6 CSF studies wnl.   -Paraneoplastic panel neg  -VEEG no seizure activity   - rheumatology state rheum origin less likely given neg for inflammatory changes; serum ALESSIA, ZAFAR, ribosomal P, dsdna neg, NMDRAb neg   -repeat head CT no acute findings 2/20  -2/23 T4 5.5 wnl, T3 53 LOW  -lead level negative   -Ceruloplasmin wnl

## 2020-02-28 NOTE — PROGRESS NOTE ADULT - ATTENDING COMMENTS
pt seen/examined:  refused meds overnight but took later in the day, is agitated and confused at times and requesting to go home    Subacute Encephalopathy: catatonic state now resolved with ativan however still likely with underlying possible frontotemporal dementia with behavioral disturbances vs other dementia given agitation and AMS she initially presented for  -severe hypothyroidism on admission, TFT improved, switch to po levothyroxine 175mg  appreciate endo recs, needs repeat TFT in 4-6 weeks  -stable SDH on imaging, no other acute MRI/CT fidnings  -s/p LP, multiple negative CSF studies including, autoimmune, paraneoplastic panel, Purkinje fibers, etc all negative  -EEG showing diffuse cerebral slowing/dysfunction- no epileptiform activity  -avoid Depakote given reported hx of catatonic state in past per son  -per neuro, no further workup, stable for discharge  -appreciate psych recs, no contraindication for discharge, change to po ativan 1mg TID   -switch meds to po    Candida UTI:  resolved, completed 5 day course of fluconazole per ID, not contributing to AMS  -previously complete course of zosyn for Ecoli UTI  -wbc improved, afebrile, HD stable     Nutrition/Type 2 Diabetes: tolerating regular diet  -FS stable, insulin per endocrine, monitor FS    Dispo: pending PT eval likely rehab

## 2020-02-28 NOTE — PROGRESS NOTE ADULT - ASSESSMENT
64 F hx functional paraplegia bedbound, hypothyroidism, pulmonary HTN, DM on insulin, COPD/CHRIS on noctournal CPAP and 3L home), RA on chronic prednisone, chronic tremors, SBO s/p ex lap with lysis of adhesions (4 retention sutures), prior history enterococcus bacteremia  Leukocytosis, prior fevers  Worsening AMS  UA positive, UCX Candida  CT A/P neg, without signs infection (no signs of invasive fungal infection)  CXR clear  Mental status improved, seems likely AMS not related to infection (Team suspecting due to catatonia)  Overall,  1) UTI  - S/p 5 days Fluconazole, discontinue  - F/U pending BCX  2) Leukocytosis  - Trend to normal  3) AMS  - Monitor mental status  - Noninfectious causes AMS per primary team    Signing off. Please call with further questions or change in status.    Nabor Gomez MD  Pager [phone]  After 5pm and on weekends call [phone]

## 2020-02-28 NOTE — PROGRESS NOTE ADULT - SUBJECTIVE AND OBJECTIVE BOX
CC: F/U for AMS    Saw/spoke to patient. No fevers, no chills. No new complaints. Patient at baseline.    Allergies  Depakote (Other)  Seroquel (Other (Severe))    ANTIMICROBIALS:  Off    PE:    Vital Signs Last 24 Hrs  T(C): 36.8 (27 Feb 2020 20:49), Max: 36.9 (27 Feb 2020 17:59)  T(F): 98.3 (27 Feb 2020 20:49), Max: 98.5 (27 Feb 2020 17:59)  HR: 94 (27 Feb 2020 20:49) (94 - 109)  BP: 131/91 (27 Feb 2020 20:49) (131/91 - 139/91)  RR: 17 (27 Feb 2020 20:49) (16 - 17)  SpO2: 95% (27 Feb 2020 20:49) (94% - 95%)    Gen: AOx1, NAD, non-toxic  CV: S1+S2 normal, nontachycardic  Resp: Clear bilat, no resp distress, no crackles/wheezes  Abd: Soft, nontender, +BS  Ext: No LE edema, no wounds    LABS:                        9.7    11.54 )-----------( 385      ( 28 Feb 2020 07:27 )             33.6     02-28    141  |  101  |  21  ----------------------------<  118<H>  4.2   |  22  |  0.94    Ca    9.4      28 Feb 2020 07:26  Phos  3.0     02-28  Mg     2.1     02-28    MICROBIOLOGY:    .Blood Blood-Peripheral  02-20-20   No growth at 5 days.    .Urine Catheterized  02-20-20   >100,000 CFU/ml Presumptive Candida albicans "Susceptibilities not  performed"     .Urine Catheterized  02-14-20   >100,000 CFU/ml Escherichia coli  --  Escherichia coli    Rapid RVP Result: NotDetec (02-25 @ 14:48)    (otherwise reviewed)    RADIOLOGY:    2/26 XR:    IMPRESSION:   Enteric catheter is present within stomach.    Clear lungs.

## 2020-02-28 NOTE — PROGRESS NOTE ADULT - ASSESSMENT
63 y/o F w/h/o uncontrolled T2DM with unknown complications. Also h/o functional paraplegia, hypothyroidism, pulmonary hypertension, COPD, RA on chronic prednisone, SBO s/p ex lap with lysis of adhesions c/b multifocal pneumonia with AMS of unknown etiology. Pt has been alert but unresponsive for several days on catatonic state that resolved with ativan administration started yesterday. Off TFs and tolerating Pos with prandial hyperglycemia while on present insulin doses. No hypoglycemia. Restarted PO synthroid and Prednisone but didn't get synthroid today. Will increase premeal insulin dose. BG goal (100-180mg/dl).    Spent a total time of 25 mins assessing pt/labs/meds and chart and setting plan for DM/hypothyroidism care with team and RN.

## 2020-02-28 NOTE — PROGRESS NOTE BEHAVIORAL HEALTH - NSBHCONSULTMEDS_PSY_A_CORE FT
normal (ped)...
check  MRI  EEG  b12, folate RPR  pt likely to refuse-does not have capacity to refuse, defer to HCP   UA, UCx. rule out UTI
cont ativan 1mg iv tid
can change ativan to 1mg po tid
check  MRI  EEG  b12, folate RPR  pt likely to refuse-does not have capacity to refuse, defer to HCP   UA, UCx. rule out UTI

## 2020-02-29 LAB
GLUCOSE BLDC GLUCOMTR-MCNC: 131 MG/DL — HIGH (ref 70–99)
GLUCOSE BLDC GLUCOMTR-MCNC: 136 MG/DL — HIGH (ref 70–99)
GLUCOSE BLDC GLUCOMTR-MCNC: 250 MG/DL — HIGH (ref 70–99)
GLUCOSE BLDC GLUCOMTR-MCNC: 253 MG/DL — HIGH (ref 70–99)
VALPROATE FREE SERPL-MCNC: < 1.9 MG/L — LOW (ref 4.8–17.3)

## 2020-02-29 PROCEDURE — 99232 SBSQ HOSP IP/OBS MODERATE 35: CPT | Mod: GC

## 2020-02-29 RX ORDER — INSULIN LISPRO 100/ML
12 VIAL (ML) SUBCUTANEOUS
Refills: 0 | Status: DISCONTINUED | OUTPATIENT
Start: 2020-02-29 | End: 2020-03-01

## 2020-02-29 RX ORDER — INSULIN GLARGINE 100 [IU]/ML
24 INJECTION, SOLUTION SUBCUTANEOUS
Refills: 0 | Status: DISCONTINUED | OUTPATIENT
Start: 2020-03-01 | End: 2020-03-12

## 2020-02-29 RX ADMIN — Medication 4: at 14:49

## 2020-02-29 RX ADMIN — Medication 6: at 08:20

## 2020-02-29 RX ADMIN — Medication 10 UNIT(S): at 08:20

## 2020-02-29 RX ADMIN — PANTOPRAZOLE SODIUM 40 MILLIGRAM(S): 20 TABLET, DELAYED RELEASE ORAL at 06:27

## 2020-02-29 RX ADMIN — Medication 15 MILLIGRAM(S): at 06:28

## 2020-02-29 RX ADMIN — Medication 12 UNIT(S): at 14:49

## 2020-02-29 RX ADMIN — BUDESONIDE AND FORMOTEROL FUMARATE DIHYDRATE 2 PUFF(S): 160; 4.5 AEROSOL RESPIRATORY (INHALATION) at 06:08

## 2020-02-29 RX ADMIN — INSULIN GLARGINE 20 UNIT(S): 100 INJECTION, SOLUTION SUBCUTANEOUS at 06:40

## 2020-02-29 RX ADMIN — Medication 25 MILLIGRAM(S): at 06:27

## 2020-02-29 RX ADMIN — HEPARIN SODIUM 5000 UNIT(S): 5000 INJECTION INTRAVENOUS; SUBCUTANEOUS at 21:44

## 2020-02-29 RX ADMIN — HEPARIN SODIUM 5000 UNIT(S): 5000 INJECTION INTRAVENOUS; SUBCUTANEOUS at 14:50

## 2020-02-29 NOTE — PROGRESS NOTE ADULT - PROBLEM SELECTOR PLAN 1
Unclear etiology. Per family this is rapidly progressing. Patient had some minor memory impairments roughly 30 days prior to admission when she started suffering from memory loss and paranoid delusions.   -more recently was in a catatonic state which improved with ativan on 2/26  -per neuro and psych, c/w Ativan 1mg TID, son agreeable   -haldol prn for agitation  -Ddx includes psychosis/delirium 2/2 hypothyroidism. PE significant for myxedema. , free T4 0.3. Endocrine following. Now on IV synthroid. Ddx also includes FT dementia   -MRI brain 3mm right sided SDH no neurosurgical intervention   -LP 2/6 CSF studies wnl.   -Paraneoplastic panel neg  -VEEG no seizure activity   - rheumatology state rheum origin less likely given neg for inflammatory changes; serum ALESSIA, ZAFAR, ribosomal P, dsdna neg, NMDRAb neg   -repeat head CT no acute findings 2/20  -2/23 T4 5.5 wnl, T3 53 LOW  -lead level negative   -Ceruloplasmin wnl Likely underlying rapidly progressive dementia, hospital delirium  -period of catatonic state which improved with ativan on 2/26,   -per neuro and psych, c/w Ativan 1mg TID, son agreeable   -haldol prn for agitation  , free T4 0.3. Endocrine following. Now on IV synthroid. Ddx also includes FT dementia   -MRI brain 3mm right sided SDH no neurosurgical intervention   -LP 2/6 CSF studies wnl.   -Paraneoplastic panel neg  -VEEG no seizure activity   - rheumatology state rheum origin less likely given neg for inflammatory changes; serum ALESSIA, ZAFAR, ribosomal P, dsdna neg, NMDRAb neg   -repeat head CT no acute findings 2/20  -2/23 T4 5.5 wnl, T3 53 LOW  -lead level negative   -Ceruloplasmin wnl  per neuro and psych stable for discharge

## 2020-02-29 NOTE — CHART NOTE - NSCHARTNOTEFT_GEN_A_CORE
Patient not seen, chart reviewed, FS noted to be high in 200s, increased basal/bolus: Lantus 24 units sq at 6 am daily and Humalog 12 units sq ac TID. Continue moderate Humalog correctional scale ac and hs.

## 2020-02-29 NOTE — PROGRESS NOTE ADULT - ATTENDING COMMENTS
no overnight events, waxing and waning agitation, but no longer lethargic or catatonic     Subacute Encephalopathy: catatonic state now resolved with ativan however still likely with underlying possible frontotemporal dementia with behavioral disturbances vs other dementia given agitation and AMS she initially presented for  -severe hypothyroidism on admission, TFT improved, switch to po levothyroxine 175mg  appreciate endo recs, needs repeat TFT in 4-6 weeks  -stable SDH on imaging, no other acute MRI/CT fidnings  -s/p LP, multiple negative CSF studies including, autoimmune, paraneoplastic panel, Purkinje fibers, etc all negative  -EEG showing diffuse cerebral slowing/dysfunction- no epileptiform activity  -avoid Depakote given reported hx of catatonic state in past per son  -per neuro, no further workup, stable for discharge  -appreciate psych recs, no contraindication for discharge, c/w po ativan 1mg TID   -switch meds to po    Candida UTI:  resolved, completed 5 day course of fluconazole per ID, not contributing to AMS  -previously complete course of zosyn for Ecoli UTI  -wbc improved, afebrile, HD stable     Nutrition/Type 2 Diabetes: tolerating regular diet  -FS slightly high, adjust insulin per endocrine, monitor FS    Dispo: repeat PT eval but patient states she want to go to [place] to live with her daughter, called daughter Heavenly Arevalo, no answer awaiting call back

## 2020-02-29 NOTE — PROGRESS NOTE ADULT - PROBLEM SELECTOR PLAN 5
-Continuous tube feeds NG tube 2/20 - 2/26. patient pulled NG tube 2/26 overnight.  -f/u speech and swallow to restart PO   -NPH discontinued now patient off tube feeds   -lantus 20U qdaily  -humalog 6U pre-meal  -c/w moderate ISS, endocrine adjusting -Continuous tube feeds NG tube 2/20 - 2/26. patient pulled NG tube 2/26 overnight.  -soft diet   -lantus 20U qdaily  -humalog 10U pre-meal  -c/w moderate ISS, endocrine adjusting -Continuous tube feeds NG tube 2/20 - 2/26. patient pulled NG tube 2/26 overnight  -soft diet per speech, tolerating well  -lantus 20U qdaily  -humalog 10U pre-meal  -c/w moderate ISS, endocrine adjusting

## 2020-02-29 NOTE — PROGRESS NOTE ADULT - PROBLEM SELECTOR PLAN 7
- Bcx + for GPR on 1/12, 1 out of 2 bottles. +Bcx from 1/12 sent to [facility] laboratories, no further speciation available.   - Blood cx obtained on 1/25 no growth X 5 days. Repeat 2/14 NGTD.   - repeat blood cultures negative, ID following contaminant  -- Bcx + for GPR on 1/12, 1 out of 2 bottles. +Bcx from 1/12 sent to [facility] laboratories, no further speciation available.   - Blood cx obtained on 1/25 no growth X 5 days. Repeat 2/14 NGTD.   - repeat blood cultures negative, ID following

## 2020-02-29 NOTE — PROGRESS NOTE ADULT - SUBJECTIVE AND OBJECTIVE BOX
***************************************************************  Logan Bryan, PGY1  Internal Medicine   pager: [phone]  ***************************************************************    AFTAB BASS  64y  MRN: [id number]    Patient is a 64y old  Female who presents with a chief complaint of change in mental status (28 Feb 2020 16:10)      Subjective: no events ON. Denies fever, CP, SOB, abn pain, N/V, dysuria. Tolerating diet.      MEDICATIONS  (STANDING):  budesonide  80 MICROgram(s)/formoterol 4.5 MICROgram(s) Inhaler 2 Puff(s) Inhalation two times a day  dextrose 5%. 1000 milliLiter(s) (50 mL/Hr) IV Continuous <Continuous>  dextrose 50% Injectable 12.5 Gram(s) IV Push once  dextrose 50% Injectable 25 Gram(s) IV Push once  dextrose 50% Injectable 25 Gram(s) IV Push once  heparin  Injectable 5000 Unit(s) SubCutaneous every 8 hours  influenza   Vaccine 0.5 milliLiter(s) IntraMuscular once  insulin glargine Injectable (LANTUS) 20 Unit(s) SubCutaneous <User Schedule>  insulin lispro (HumaLOG) corrective regimen sliding scale   SubCutaneous three times a day before meals  insulin lispro (HumaLOG) corrective regimen sliding scale   SubCutaneous at bedtime  insulin lispro Injectable (HumaLOG) 10 Unit(s) SubCutaneous three times a day with meals  levothyroxine 175 MICROGram(s) Oral daily  LORazepam     Tablet 1 milliGRAM(s) Oral three times a day  losartan 25 milliGRAM(s) Oral daily  metoprolol tartrate 25 milliGRAM(s) Oral two times a day  multivitamin/minerals/iron Oral Solution (CENTRUM) 15 milliLiter(s) Oral daily  pantoprazole    Tablet 40 milliGRAM(s) Oral before breakfast  predniSONE   Tablet 15 milliGRAM(s) Oral daily  primidone 50 milliGRAM(s) Oral at bedtime  simvastatin 20 milliGRAM(s) Oral at bedtime  thiamine 100 milliGRAM(s) Oral daily    MEDICATIONS  (PRN):  acetaminophen  Suppository .. 650 milliGRAM(s) Rectal every 6 hours PRN Temp greater or equal to 38C (100.4F)  albuterol/ipratropium for Nebulization 3 milliLiter(s) Nebulizer every 6 hours PRN Shortness of Breath and/or Wheezing  dextrose 40% Gel 15 Gram(s) Oral once PRN Blood Glucose LESS THAN 70 milliGRAM(s)/deciLiter  glucagon  Injectable 1 milliGRAM(s) IntraMuscular once PRN Glucose <70 milliGRAM(s)/deciLiter  haloperidol    Injectable 5 milliGRAM(s) IV Push every 6 hours PRN Agitation  melatonin 3 milliGRAM(s) Oral at bedtime PRN Insomnia      Objective:    Vitals: Vital Signs Last 24 Hrs  T(C): 36.6 (02-28-20 @ 22:23), Max: 36.9 (02-28-20 @ 14:49)  T(F): 97.9 (02-28-20 @ 22:23), Max: 98.5 (02-28-20 @ 14:49)  HR: 111 (02-29-20 @ 06:14) (111 - 115)  BP: 137/94 (02-28-20 @ 22:23) (92/55 - 137/94)  BP(mean): --  RR: 18 (02-28-20 @ 22:23) (18 - 18)  SpO2: 95% (02-29-20 @ 06:14) (95% - 95%)            I&O's Summary    28 Feb 2020 07:01  -  29 Feb 2020 07:00  --------------------------------------------------------  IN: 480 mL / OUT: 0 mL / NET: 480 mL      PHYSICAL EXAM:  GENERAL: NAD, obese  HEAD:  Atraumatic, Normocephalic.  CHEST/LUNG: Clear to auscultation bilaterally anteriorly;   HEART: Regular rate and rhythm;   ABDOMEN: Soft, Nontender, Nondistended; Bowel sounds present. midline surgical scar with dressing in place c/d/i  EXTREMITIES:  2+ Peripheral Pulses, No clubbing, cyanosis, or edema  PSYCH: AAOx1  NEUROLOGY: non-focal,  SKIN: edematous, no rashes or skin lesions      LABS:  02-28    141  |  101  |  21  ----------------------------<  118<H>  4.2   |  22  |  0.94  02-27    142  |  100  |  16  ----------------------------<  113<H>  3.7   |  26  |  0.83    Ca    9.4      28 Feb 2020 07:26  Ca    9.2      27 Feb 2020 07:12  Phos  3.0     02-28  Mg     2.1     02-28                                                9.7    11.54 )-----------( 385      ( 28 Feb 2020 07:27 )             33.6                         9.3    10.48 )-----------( 346      ( 27 Feb 2020 07:12 )             32.8     CAPILLARY BLOOD GLUCOSE      POCT Blood Glucose.: 253 mg/dL (29 Feb 2020 08:10)  POCT Blood Glucose.: 221 mg/dL (28 Feb 2020 17:17)  POCT Blood Glucose.: 292 mg/dL (28 Feb 2020 12:10)      RADIOLOGY & ADDITIONAL TESTS:    Imaging Personally Reviewed:  [x ] YES  [ ] NO    Consultants involved in case:   Consultant(s) Notes Reviewed:  [ x] YES  [ ] NO:   Care Discussed with Consultants/Other Providers [x ] YES  [ ] NO ***************************************************************  Logan Bryan, PGY1  Internal Medicine   pager: [phone]  ***************************************************************    AFTAB BASS  64y  MRN: [id number]    Patient is a 64y old  Female who presents with a chief complaint of change in mental status (28 Feb 2020 16:10)      Subjective: no events ON. Patient agitated this morning. Able to respond appropriately to questions asked. States that she would like to leave and live with her daughter in [place] once discharged. Denies fever, CP, SOB, abn pain, N/V, dysuria. Tolerating diet.      MEDICATIONS  (STANDING):  budesonide  80 MICROgram(s)/formoterol 4.5 MICROgram(s) Inhaler 2 Puff(s) Inhalation two times a day  dextrose 5%. 1000 milliLiter(s) (50 mL/Hr) IV Continuous <Continuous>  dextrose 50% Injectable 12.5 Gram(s) IV Push once  dextrose 50% Injectable 25 Gram(s) IV Push once  dextrose 50% Injectable 25 Gram(s) IV Push once  heparin  Injectable 5000 Unit(s) SubCutaneous every 8 hours  influenza   Vaccine 0.5 milliLiter(s) IntraMuscular once  insulin glargine Injectable (LANTUS) 20 Unit(s) SubCutaneous <User Schedule>  insulin lispro (HumaLOG) corrective regimen sliding scale   SubCutaneous three times a day before meals  insulin lispro (HumaLOG) corrective regimen sliding scale   SubCutaneous at bedtime  insulin lispro Injectable (HumaLOG) 10 Unit(s) SubCutaneous three times a day with meals  levothyroxine 175 MICROGram(s) Oral daily  LORazepam     Tablet 1 milliGRAM(s) Oral three times a day  losartan 25 milliGRAM(s) Oral daily  metoprolol tartrate 25 milliGRAM(s) Oral two times a day  multivitamin/minerals/iron Oral Solution (CENTRUM) 15 milliLiter(s) Oral daily  pantoprazole    Tablet 40 milliGRAM(s) Oral before breakfast  predniSONE   Tablet 15 milliGRAM(s) Oral daily  primidone 50 milliGRAM(s) Oral at bedtime  simvastatin 20 milliGRAM(s) Oral at bedtime  thiamine 100 milliGRAM(s) Oral daily    MEDICATIONS  (PRN):  acetaminophen  Suppository .. 650 milliGRAM(s) Rectal every 6 hours PRN Temp greater or equal to 38C (100.4F)  albuterol/ipratropium for Nebulization 3 milliLiter(s) Nebulizer every 6 hours PRN Shortness of Breath and/or Wheezing  dextrose 40% Gel 15 Gram(s) Oral once PRN Blood Glucose LESS THAN 70 milliGRAM(s)/deciLiter  glucagon  Injectable 1 milliGRAM(s) IntraMuscular once PRN Glucose <70 milliGRAM(s)/deciLiter  haloperidol    Injectable 5 milliGRAM(s) IV Push every 6 hours PRN Agitation  melatonin 3 milliGRAM(s) Oral at bedtime PRN Insomnia      Objective:    Vitals: Vital Signs Last 24 Hrs  T(C): 36.6 (02-28-20 @ 22:23), Max: 36.9 (02-28-20 @ 14:49)  T(F): 97.9 (02-28-20 @ 22:23), Max: 98.5 (02-28-20 @ 14:49)  HR: 111 (02-29-20 @ 06:14) (111 - 115)  BP: 137/94 (02-28-20 @ 22:23) (92/55 - 137/94)  BP(mean): --  RR: 18 (02-28-20 @ 22:23) (18 - 18)  SpO2: 95% (02-29-20 @ 06:14) (95% - 95%)            I&O's Summary    28 Feb 2020 07:01  -  29 Feb 2020 07:00  --------------------------------------------------------  IN: 480 mL / OUT: 0 mL / NET: 480 mL      PHYSICAL EXAM:  GENERAL: agitated female lying in bed  HEAD:  Atraumatic, Normocephalic.  CHEST/LUNG: Clear to auscultation bilaterally anteriorly;   HEART: Regular rate and rhythm;   ABDOMEN: Soft, Nontender, Nondistended; Bowel sounds present. midline surgical scar with dressing in place c/d/i  EXTREMITIES:  2+ Peripheral Pulses, No clubbing, cyanosis, or edema  PSYCH: AAOx3  NEUROLOGY: non-focal,  SKIN: edematous, no rashes or skin lesions      LABS:  02-28    141  |  101  |  21  ----------------------------<  118<H>  4.2   |  22  |  0.94  02-27    142  |  100  |  16  ----------------------------<  113<H>  3.7   |  26  |  0.83    Ca    9.4      28 Feb 2020 07:26  Ca    9.2      27 Feb 2020 07:12  Phos  3.0     02-28  Mg     2.1     02-28                                                9.7    11.54 )-----------( 385      ( 28 Feb 2020 07:27 )             33.6                         9.3    10.48 )-----------( 346      ( 27 Feb 2020 07:12 )             32.8     CAPILLARY BLOOD GLUCOSE      POCT Blood Glucose.: 253 mg/dL (29 Feb 2020 08:10)  POCT Blood Glucose.: 221 mg/dL (28 Feb 2020 17:17)  POCT Blood Glucose.: 292 mg/dL (28 Feb 2020 12:10)      RADIOLOGY & ADDITIONAL TESTS:    Imaging Personally Reviewed:  [x ] YES  [ ] NO    Consultants involved in case:   Consultant(s) Notes Reviewed:  [ x] YES  [ ] NO:   Care Discussed with Consultants/Other Providers [x ] YES  [ ] NO

## 2020-02-29 NOTE — PROGRESS NOTE ADULT - PROBLEM SELECTOR PLAN 3
-T4 wnl  -T3 LOW 53  -last day synthroid 87.5 mg IV, per endo  -PO synthroid 175mcg qdaily starting 2/28 -T4 wnl  -T3 LOW 53  -PO synthroid 175mcg qdaily -T4 wnl  -T3 LOW 53  -PO synthroid 175mcg qdaily  -outpatient repeat tfts, apprecaite endo recs

## 2020-03-01 LAB
ANION GAP SERPL CALC-SCNC: 16 MMOL/L — SIGNIFICANT CHANGE UP (ref 5–17)
BASOPHILS # BLD AUTO: 0.05 K/UL — SIGNIFICANT CHANGE UP (ref 0–0.2)
BASOPHILS NFR BLD AUTO: 0.5 % — SIGNIFICANT CHANGE UP (ref 0–2)
BUN SERPL-MCNC: 24 MG/DL — HIGH (ref 7–23)
CALCIUM SERPL-MCNC: 9.3 MG/DL — SIGNIFICANT CHANGE UP (ref 8.4–10.5)
CHLORIDE SERPL-SCNC: 103 MMOL/L — SIGNIFICANT CHANGE UP (ref 96–108)
CO2 SERPL-SCNC: 21 MMOL/L — LOW (ref 22–31)
CREAT SERPL-MCNC: 0.99 MG/DL — SIGNIFICANT CHANGE UP (ref 0.5–1.3)
EOSINOPHIL # BLD AUTO: 0.15 K/UL — SIGNIFICANT CHANGE UP (ref 0–0.5)
EOSINOPHIL NFR BLD AUTO: 1.6 % — SIGNIFICANT CHANGE UP (ref 0–6)
GLUCOSE BLDC GLUCOMTR-MCNC: 105 MG/DL — HIGH (ref 70–99)
GLUCOSE BLDC GLUCOMTR-MCNC: 140 MG/DL — HIGH (ref 70–99)
GLUCOSE BLDC GLUCOMTR-MCNC: 148 MG/DL — HIGH (ref 70–99)
GLUCOSE BLDC GLUCOMTR-MCNC: 154 MG/DL — HIGH (ref 70–99)
GLUCOSE BLDC GLUCOMTR-MCNC: 99 MG/DL — SIGNIFICANT CHANGE UP (ref 70–99)
GLUCOSE SERPL-MCNC: 132 MG/DL — HIGH (ref 70–99)
HCT VFR BLD CALC: 31.8 % — LOW (ref 34.5–45)
HGB BLD-MCNC: 9.4 G/DL — LOW (ref 11.5–15.5)
IMM GRANULOCYTES NFR BLD AUTO: 1.3 % — SIGNIFICANT CHANGE UP (ref 0–1.5)
LYMPHOCYTES # BLD AUTO: 3.51 K/UL — HIGH (ref 1–3.3)
LYMPHOCYTES # BLD AUTO: 36.4 % — SIGNIFICANT CHANGE UP (ref 13–44)
MAGNESIUM SERPL-MCNC: 2 MG/DL — SIGNIFICANT CHANGE UP (ref 1.6–2.6)
MCHC RBC-ENTMCNC: 29.6 GM/DL — LOW (ref 32–36)
MCHC RBC-ENTMCNC: 29.7 PG — SIGNIFICANT CHANGE UP (ref 27–34)
MCV RBC AUTO: 100.6 FL — HIGH (ref 80–100)
MONOCYTES # BLD AUTO: 1.01 K/UL — HIGH (ref 0–0.9)
MONOCYTES NFR BLD AUTO: 10.5 % — SIGNIFICANT CHANGE UP (ref 2–14)
NEUTROPHILS # BLD AUTO: 4.8 K/UL — SIGNIFICANT CHANGE UP (ref 1.8–7.4)
NEUTROPHILS NFR BLD AUTO: 49.7 % — SIGNIFICANT CHANGE UP (ref 43–77)
NRBC # BLD: 0 /100 WBCS — SIGNIFICANT CHANGE UP (ref 0–0)
PHOSPHATE SERPL-MCNC: 2.8 MG/DL — SIGNIFICANT CHANGE UP (ref 2.5–4.5)
PLATELET # BLD AUTO: 388 K/UL — SIGNIFICANT CHANGE UP (ref 150–400)
POTASSIUM SERPL-MCNC: 4.4 MMOL/L — SIGNIFICANT CHANGE UP (ref 3.5–5.3)
POTASSIUM SERPL-SCNC: 4.4 MMOL/L — SIGNIFICANT CHANGE UP (ref 3.5–5.3)
RBC # BLD: 3.16 M/UL — LOW (ref 3.8–5.2)
RBC # FLD: 14.6 % — HIGH (ref 10.3–14.5)
SODIUM SERPL-SCNC: 140 MMOL/L — SIGNIFICANT CHANGE UP (ref 135–145)
WBC # BLD: 9.65 K/UL — SIGNIFICANT CHANGE UP (ref 3.8–10.5)
WBC # FLD AUTO: 9.65 K/UL — SIGNIFICANT CHANGE UP (ref 3.8–10.5)

## 2020-03-01 PROCEDURE — 99232 SBSQ HOSP IP/OBS MODERATE 35: CPT | Mod: GC

## 2020-03-01 RX ORDER — ACETAMINOPHEN 500 MG
975 TABLET ORAL EVERY 6 HOURS
Refills: 0 | Status: DISCONTINUED | OUTPATIENT
Start: 2020-03-01 | End: 2020-03-27

## 2020-03-01 RX ORDER — MULTIVIT-MIN/FERROUS GLUCONATE 9 MG/15 ML
15 LIQUID (ML) ORAL DAILY
Refills: 0 | Status: DISCONTINUED | OUTPATIENT
Start: 2020-03-01 | End: 2020-03-18

## 2020-03-01 RX ORDER — LOSARTAN POTASSIUM 100 MG/1
25 TABLET, FILM COATED ORAL DAILY
Refills: 0 | Status: DISCONTINUED | OUTPATIENT
Start: 2020-03-02 | End: 2020-03-27

## 2020-03-01 RX ORDER — THIAMINE MONONITRATE (VIT B1) 100 MG
100 TABLET ORAL DAILY
Refills: 0 | Status: DISCONTINUED | OUTPATIENT
Start: 2020-03-01 | End: 2020-03-27

## 2020-03-01 RX ORDER — NYSTATIN CREAM 100000 [USP'U]/G
1 CREAM TOPICAL EVERY 12 HOURS
Refills: 0 | Status: DISCONTINUED | OUTPATIENT
Start: 2020-03-01 | End: 2020-03-27

## 2020-03-01 RX ORDER — METOPROLOL TARTRATE 50 MG
25 TABLET ORAL
Refills: 0 | Status: DISCONTINUED | OUTPATIENT
Start: 2020-03-01 | End: 2020-03-27

## 2020-03-01 RX ORDER — INSULIN LISPRO 100/ML
10 VIAL (ML) SUBCUTANEOUS
Refills: 0 | Status: DISCONTINUED | OUTPATIENT
Start: 2020-03-01 | End: 2020-03-11

## 2020-03-01 RX ADMIN — NYSTATIN CREAM 1 APPLICATION(S): 100000 CREAM TOPICAL at 21:18

## 2020-03-01 RX ADMIN — Medication 975 MILLIGRAM(S): at 22:25

## 2020-03-01 RX ADMIN — Medication 25 MILLIGRAM(S): at 18:51

## 2020-03-01 RX ADMIN — Medication 10 UNIT(S): at 18:50

## 2020-03-01 RX ADMIN — HEPARIN SODIUM 5000 UNIT(S): 5000 INJECTION INTRAVENOUS; SUBCUTANEOUS at 14:04

## 2020-03-01 RX ADMIN — PANTOPRAZOLE SODIUM 40 MILLIGRAM(S): 20 TABLET, DELAYED RELEASE ORAL at 09:31

## 2020-03-01 RX ADMIN — Medication 2: at 14:04

## 2020-03-01 RX ADMIN — Medication 3 MILLIGRAM(S): at 21:13

## 2020-03-01 RX ADMIN — Medication 975 MILLIGRAM(S): at 21:25

## 2020-03-01 RX ADMIN — Medication 1 MILLIGRAM(S): at 10:04

## 2020-03-01 RX ADMIN — PRIMIDONE 50 MILLIGRAM(S): 250 TABLET ORAL at 21:14

## 2020-03-01 RX ADMIN — Medication 12 UNIT(S): at 09:31

## 2020-03-01 RX ADMIN — SIMVASTATIN 20 MILLIGRAM(S): 20 TABLET, FILM COATED ORAL at 21:13

## 2020-03-01 RX ADMIN — Medication 1 MILLIGRAM(S): at 21:13

## 2020-03-01 RX ADMIN — Medication 100 MILLIGRAM(S): at 14:02

## 2020-03-01 RX ADMIN — Medication 1 MILLIGRAM(S): at 14:02

## 2020-03-01 RX ADMIN — Medication 15 MILLILITER(S): at 14:03

## 2020-03-01 RX ADMIN — HEPARIN SODIUM 5000 UNIT(S): 5000 INJECTION INTRAVENOUS; SUBCUTANEOUS at 05:36

## 2020-03-01 RX ADMIN — INSULIN GLARGINE 24 UNIT(S): 100 INJECTION, SOLUTION SUBCUTANEOUS at 05:44

## 2020-03-01 RX ADMIN — Medication 12 UNIT(S): at 14:03

## 2020-03-01 RX ADMIN — HEPARIN SODIUM 5000 UNIT(S): 5000 INJECTION INTRAVENOUS; SUBCUTANEOUS at 21:14

## 2020-03-01 NOTE — PROGRESS NOTE ADULT - ASSESSMENT
64F w/ a PMHx arthritis, hypothyroidism, pulmonary HTN, DM on insulin, HTN, COPD/CHRIS on noctournal CPAP and 3L home), RA on chronic prednisone, chronic tremors, SBO s/p ex lap with lysis of adhesions c/b evisceration p/w agitation 3 weeks of unclear etiology, found to be significantly hypothyroid (), MRI brain found to have small right sided SDH, hospital course c/b catatonia improved with Ativan

## 2020-03-01 NOTE — PROGRESS NOTE ADULT - PROBLEM SELECTOR PLAN 7
contaminant  -Bcx + for GPR on 1/12, 1 out of 2 bottles. +Bcx from 1/12 sent to [facility] laboratories, no further speciation available.   - Blood cx obtained on 1/25 no growth X 5 days. Repeat 2/14 NGTD.   - repeat blood cultures negative, ID following

## 2020-03-01 NOTE — PROGRESS NOTE ADULT - PROBLEM SELECTOR PLAN 1
Likely underlying rapidly progressive dementia, hospital delirium  -period of catatonic state which improved with ativan  -per neuro and psych, c/w Ativan 1mg TID, son agreeable   -haldol prn for agitation  , free T4 0.3. Endocrine following. Now on IV synthroid. Ddx also includes FT dementia   -MRI brain 3mm right sided SDH no neurosurgical intervention   -LP 2/6 CSF studies wnl.   -Paraneoplastic panel neg  -VEEG no seizure activity   - rheumatology state rheum origin less likely given neg for inflammatory changes; serum ALESSIA, ZAFAR, ribosomal P, dsdna neg, NMDRAb neg   -repeat head CT no acute findings 2/20  -2/23 T4 5.5 wnl, T3 53 LOW  -lead level negative   -Ceruloplasmin wnl  per neuro and psych stable for discharge

## 2020-03-01 NOTE — PROGRESS NOTE ADULT - PROBLEM SELECTOR PLAN 5
-Continuous tube feeds NG tube 2/20 - 2/26. patient pulled NG tube 2/26 overnight  -soft diet per speech, tolerating well  -lantus 24U qdaily  -humalog 12U pre-meal  -c/w moderate ISS, endocrine adjusting

## 2020-03-01 NOTE — PROGRESS NOTE ADULT - ATTENDING COMMENTS
more lethargic overnight but improved this AM with IV ativan 1mg   waxing and waning agitation     Subacute Encephalopathy: waxing and waning agitation and lethargy, was in catatonic state which resolved with ativan however, likely with underlying possible frontotemporal dementia with behavioral disturbances vs other dementia given agitation and AMS she initially presented for  -severe hypothyroidism on admission, TFT improved, switch to po levothyroxine 175mg  appreciate endo recs, needs repeat TFT in 4-6 weeks  -stable SDH on imaging, no other acute MRI/CT findings  -s/p LP, multiple negative CSF studies including, autoimmune, paraneoplastic panel, Purkinje fibers, etc all negative  -EEG showing diffuse cerebral slowing/dysfunction- no epileptiform activity  -avoid Depakote given reported hx of catatonic state in past per son  -per neuro, no further workup, stable for discharge  -appreciate psych recs, no contraindication for discharge, c/w po ativan 1mg TID   -switch meds to po    Candida UTI:  resolved, completed 5 day course of fluconazole per ID, not contributing to AMS  -previously complete course of zosyn for Ecoli UTI  -wbc improved, afebrile, HD stable     Nutrition/Type 2 Diabetes: tolerating regular diet  -FS improved, adjust insulin per endocrine, monitor FS    Dispo: repeat PT eval but patient states she want to go to [place] to live with her daughter

## 2020-03-01 NOTE — PROGRESS NOTE ADULT - SUBJECTIVE AND OBJECTIVE BOX
Toan Valerio, PGY-3  Internal Medicine Team  Pager:  [phone]/ [phone]    Patient is a 64y old  Female who presents with a chief complaint of change in mental status (29 Feb 2020 08:30)      SUBJECTIVE / OVERNIGHT EVENTS: FLORA overnight. Patient refusing all vitals and FS. Patient this AM lethargic and not answering questions.    MEDICATIONS  (STANDING):  budesonide  80 MICROgram(s)/formoterol 4.5 MICROgram(s) Inhaler 2 Puff(s) Inhalation two times a day  dextrose 5%. 1000 milliLiter(s) (50 mL/Hr) IV Continuous <Continuous>  dextrose 50% Injectable 12.5 Gram(s) IV Push once  dextrose 50% Injectable 25 Gram(s) IV Push once  dextrose 50% Injectable 25 Gram(s) IV Push once  heparin  Injectable 5000 Unit(s) SubCutaneous every 8 hours  influenza   Vaccine 0.5 milliLiter(s) IntraMuscular once  insulin glargine Injectable (LANTUS) 24 Unit(s) SubCutaneous <User Schedule>  insulin lispro (HumaLOG) corrective regimen sliding scale   SubCutaneous three times a day before meals  insulin lispro (HumaLOG) corrective regimen sliding scale   SubCutaneous at bedtime  insulin lispro Injectable (HumaLOG) 12 Unit(s) SubCutaneous three times a day with meals  levothyroxine 175 MICROGram(s) Oral daily  LORazepam     Tablet 1 milliGRAM(s) Oral three times a day  losartan 25 milliGRAM(s) Oral daily  metoprolol tartrate 25 milliGRAM(s) Oral two times a day  multivitamin/minerals/iron Oral Solution (CENTRUM) 15 milliLiter(s) Oral daily  pantoprazole    Tablet 40 milliGRAM(s) Oral before breakfast  predniSONE   Tablet 15 milliGRAM(s) Oral daily  primidone 50 milliGRAM(s) Oral at bedtime  simvastatin 20 milliGRAM(s) Oral at bedtime  thiamine 100 milliGRAM(s) Oral daily    MEDICATIONS  (PRN):  acetaminophen  Suppository .. 650 milliGRAM(s) Rectal every 6 hours PRN Temp greater or equal to 38C (100.4F)  albuterol/ipratropium for Nebulization 3 milliLiter(s) Nebulizer every 6 hours PRN Shortness of Breath and/or Wheezing  dextrose 40% Gel 15 Gram(s) Oral once PRN Blood Glucose LESS THAN 70 milliGRAM(s)/deciLiter  glucagon  Injectable 1 milliGRAM(s) IntraMuscular once PRN Glucose <70 milliGRAM(s)/deciLiter  haloperidol    Injectable 5 milliGRAM(s) IV Push every 6 hours PRN Agitation  melatonin 3 milliGRAM(s) Oral at bedtime PRN Insomnia      OBJECTIVE:    Vital Signs Last 24 Hrs  T(C): 37.2 (01 Mar 2020 04:48), Max: 37.2 (01 Mar 2020 04:48)  T(F): 99 (01 Mar 2020 04:48), Max: 99 (01 Mar 2020 04:48)  HR: 111 (01 Mar 2020 04:48) (97 - 111)  BP: 138/89 (01 Mar 2020 04:48) (131/94 - 138/89)  BP(mean): --  RR: 18 (01 Mar 2020 04:48) (18 - 20)  SpO2: 95% (01 Mar 2020 04:48) (95% - 99%)    CAPILLARY BLOOD GLUCOSE      POCT Blood Glucose.: 148 mg/dL (01 Mar 2020 09:02)  POCT Blood Glucose.: 140 mg/dL (01 Mar 2020 05:40)  POCT Blood Glucose.: 131 mg/dL (29 Feb 2020 21:42)  POCT Blood Glucose.: 136 mg/dL (29 Feb 2020 17:54)  POCT Blood Glucose.: 250 mg/dL (29 Feb 2020 14:13)    I&O's Summary      PHYSICAL EXAM:  GENERAL: obese, NAD, sleeping in bed   HEAD:  Atraumatic, Normocephalic  EYES: EOMI, PERRLA, conjunctiva and sclera clear  NECK: Supple, No JVD  CHEST/LUNG: Clear to auscultation bilaterally anteriorly; No wheeze  HEART: Regular rate and rhythm; No murmurs, rubs, or gallops  ABDOMEN: Soft, Nontender, Nondistended; Bowel sounds present. midline surgical scar with dressing in place c/d/i   EXTREMITIES:  2+ Peripheral Pulses, No clubbing, cyanosis, or edema  NEUROLOGY: non-focal  SKIN: No rashes or lesions    LABS:                        9.4    9.65  )-----------( 388      ( 01 Mar 2020 06:41 )             31.8     Auto Eosinophil # 0.15  / Auto Eosinophil % 1.6   / Auto Neutrophil # 4.80  / Auto Neutrophil % 49.7  / BANDS % x        03-01    140  |  103  |  24<H>  ----------------------------<  132<H>  4.4   |  21<L>  |  0.99    Ca    9.3      01 Mar 2020 06:41  Mg     2.0     03-01  Phos  2.8     03-01                RESPIRATORY  VENT:    ABG:     VBG:     RADIOLOGY & ADDITIONAL TESTS:  (Imaging Personally Reviewed)    Consultant(s) Notes Reviewed:      Care Discussed with Consultants/Other Providers: Toan Valerio, PGY-3  Internal Medicine Team  Pager:  [phone]/ [phone]    Patient is a 64y old  Female who presents with a chief complaint of change in mental status (29 Feb 2020 08:30)      SUBJECTIVE / OVERNIGHT EVENTS: FLORA overnight. Patient refusing all vitals and FS. Patient this AM lethargic and not answering questions. was given ativan 1mg and became more alert and agitated    MEDICATIONS  (STANDING):  budesonide  80 MICROgram(s)/formoterol 4.5 MICROgram(s) Inhaler 2 Puff(s) Inhalation two times a day  dextrose 5%. 1000 milliLiter(s) (50 mL/Hr) IV Continuous <Continuous>  dextrose 50% Injectable 12.5 Gram(s) IV Push once  dextrose 50% Injectable 25 Gram(s) IV Push once  dextrose 50% Injectable 25 Gram(s) IV Push once  heparin  Injectable 5000 Unit(s) SubCutaneous every 8 hours  influenza   Vaccine 0.5 milliLiter(s) IntraMuscular once  insulin glargine Injectable (LANTUS) 24 Unit(s) SubCutaneous <User Schedule>  insulin lispro (HumaLOG) corrective regimen sliding scale   SubCutaneous three times a day before meals  insulin lispro (HumaLOG) corrective regimen sliding scale   SubCutaneous at bedtime  insulin lispro Injectable (HumaLOG) 12 Unit(s) SubCutaneous three times a day with meals  levothyroxine 175 MICROGram(s) Oral daily  LORazepam     Tablet 1 milliGRAM(s) Oral three times a day  losartan 25 milliGRAM(s) Oral daily  metoprolol tartrate 25 milliGRAM(s) Oral two times a day  multivitamin/minerals/iron Oral Solution (CENTRUM) 15 milliLiter(s) Oral daily  pantoprazole    Tablet 40 milliGRAM(s) Oral before breakfast  predniSONE   Tablet 15 milliGRAM(s) Oral daily  primidone 50 milliGRAM(s) Oral at bedtime  simvastatin 20 milliGRAM(s) Oral at bedtime  thiamine 100 milliGRAM(s) Oral daily    MEDICATIONS  (PRN):  acetaminophen  Suppository .. 650 milliGRAM(s) Rectal every 6 hours PRN Temp greater or equal to 38C (100.4F)  albuterol/ipratropium for Nebulization 3 milliLiter(s) Nebulizer every 6 hours PRN Shortness of Breath and/or Wheezing  dextrose 40% Gel 15 Gram(s) Oral once PRN Blood Glucose LESS THAN 70 milliGRAM(s)/deciLiter  glucagon  Injectable 1 milliGRAM(s) IntraMuscular once PRN Glucose <70 milliGRAM(s)/deciLiter  haloperidol    Injectable 5 milliGRAM(s) IV Push every 6 hours PRN Agitation  melatonin 3 milliGRAM(s) Oral at bedtime PRN Insomnia      OBJECTIVE:    Vital Signs Last 24 Hrs  T(C): 37.2 (01 Mar 2020 04:48), Max: 37.2 (01 Mar 2020 04:48)  T(F): 99 (01 Mar 2020 04:48), Max: 99 (01 Mar 2020 04:48)  HR: 111 (01 Mar 2020 04:48) (97 - 111)  BP: 138/89 (01 Mar 2020 04:48) (131/94 - 138/89)  BP(mean): --  RR: 18 (01 Mar 2020 04:48) (18 - 20)  SpO2: 95% (01 Mar 2020 04:48) (95% - 99%)    CAPILLARY BLOOD GLUCOSE      POCT Blood Glucose.: 148 mg/dL (01 Mar 2020 09:02)  POCT Blood Glucose.: 140 mg/dL (01 Mar 2020 05:40)  POCT Blood Glucose.: 131 mg/dL (29 Feb 2020 21:42)  POCT Blood Glucose.: 136 mg/dL (29 Feb 2020 17:54)  POCT Blood Glucose.: 250 mg/dL (29 Feb 2020 14:13)    I&O's Summary      PHYSICAL EXAM:  GENERAL: obese, NAD, sleeping in bed   HEAD:  Atraumatic, Normocephalic  EYES: EOMI, PERRLA, conjunctiva and sclera clear  NECK: Supple, No JVD  CHEST/LUNG: Clear to auscultation bilaterally anteriorly; No wheeze  HEART: Regular rate and rhythm; No murmurs, rubs, or gallops  ABDOMEN: Soft, Nontender, Nondistended; Bowel sounds present. midline surgical scar with dressing in place c/d/i   EXTREMITIES:  2+ Peripheral Pulses, No clubbing, cyanosis, or edema  NEUROLOGY: non-focal  SKIN: No rashes or lesions    LABS:                        9.4    9.65  )-----------( 388      ( 01 Mar 2020 06:41 )             31.8     Auto Eosinophil # 0.15  / Auto Eosinophil % 1.6   / Auto Neutrophil # 4.80  / Auto Neutrophil % 49.7  / BANDS % x        03-01    140  |  103  |  24<H>  ----------------------------<  132<H>  4.4   |  21<L>  |  0.99    Ca    9.3      01 Mar 2020 06:41  Mg     2.0     03-01  Phos  2.8     03-01                RESPIRATORY  VENT:    ABG:     VBG:     RADIOLOGY & ADDITIONAL TESTS:  (Imaging Personally Reviewed)    Consultant(s) Notes Reviewed:      Care Discussed with Consultants/Other Providers:

## 2020-03-02 LAB
GLUCOSE BLDC GLUCOMTR-MCNC: 123 MG/DL — HIGH (ref 70–99)
GLUCOSE BLDC GLUCOMTR-MCNC: 179 MG/DL — HIGH (ref 70–99)
GLUCOSE BLDC GLUCOMTR-MCNC: 182 MG/DL — HIGH (ref 70–99)
GLUCOSE BLDC GLUCOMTR-MCNC: 258 MG/DL — HIGH (ref 70–99)

## 2020-03-02 PROCEDURE — 99232 SBSQ HOSP IP/OBS MODERATE 35: CPT | Mod: GC

## 2020-03-02 PROCEDURE — 99233 SBSQ HOSP IP/OBS HIGH 50: CPT

## 2020-03-02 PROCEDURE — 99232 SBSQ HOSP IP/OBS MODERATE 35: CPT

## 2020-03-02 RX ADMIN — Medication 2: at 13:09

## 2020-03-02 RX ADMIN — Medication 10 UNIT(S): at 13:09

## 2020-03-02 RX ADMIN — PANTOPRAZOLE SODIUM 40 MILLIGRAM(S): 20 TABLET, DELAYED RELEASE ORAL at 05:57

## 2020-03-02 RX ADMIN — Medication 975 MILLIGRAM(S): at 21:30

## 2020-03-02 RX ADMIN — Medication 15 MILLIGRAM(S): at 05:56

## 2020-03-02 RX ADMIN — Medication 1 MILLIGRAM(S): at 13:50

## 2020-03-02 RX ADMIN — NYSTATIN CREAM 1 APPLICATION(S): 100000 CREAM TOPICAL at 18:15

## 2020-03-02 RX ADMIN — LOSARTAN POTASSIUM 25 MILLIGRAM(S): 100 TABLET, FILM COATED ORAL at 05:55

## 2020-03-02 RX ADMIN — Medication 25 MILLIGRAM(S): at 05:55

## 2020-03-02 RX ADMIN — Medication 6: at 10:33

## 2020-03-02 RX ADMIN — INSULIN GLARGINE 24 UNIT(S): 100 INJECTION, SOLUTION SUBCUTANEOUS at 10:32

## 2020-03-02 RX ADMIN — HEPARIN SODIUM 5000 UNIT(S): 5000 INJECTION INTRAVENOUS; SUBCUTANEOUS at 05:55

## 2020-03-02 RX ADMIN — Medication 975 MILLIGRAM(S): at 20:54

## 2020-03-02 RX ADMIN — NYSTATIN CREAM 1 APPLICATION(S): 100000 CREAM TOPICAL at 05:57

## 2020-03-02 RX ADMIN — HEPARIN SODIUM 5000 UNIT(S): 5000 INJECTION INTRAVENOUS; SUBCUTANEOUS at 13:09

## 2020-03-02 RX ADMIN — Medication 1 MILLIGRAM(S): at 05:56

## 2020-03-02 RX ADMIN — Medication 0.5 MILLIGRAM(S): at 22:50

## 2020-03-02 RX ADMIN — BUDESONIDE AND FORMOTEROL FUMARATE DIHYDRATE 2 PUFF(S): 160; 4.5 AEROSOL RESPIRATORY (INHALATION) at 05:30

## 2020-03-02 RX ADMIN — Medication 175 MICROGRAM(S): at 05:55

## 2020-03-02 RX ADMIN — Medication 10 UNIT(S): at 10:33

## 2020-03-02 NOTE — PROGRESS NOTE ADULT - PROBLEM SELECTOR PLAN 7
most likely contaminant  -Bcx + for GPR on 1/12, 1 out of 2 bottles. +Bcx from 1/12 sent to [facility] laboratories, no further speciation available. .   - repeat blood cultures negative, ID following

## 2020-03-02 NOTE — PROGRESS NOTE ADULT - PROBLEM SELECTOR PLAN 2
-Pt currently on PO Synthroid taking it with other meds due to pt issues taking meds on time and refusing some on and off.   -C/w Synthroid 175 mcg PO daily  -Make sure med is given in an empty stomach  -Re test  TFT in 4-6 weeks.

## 2020-03-02 NOTE — CHART NOTE - NSCHARTNOTEFT_GEN_A_CORE
Patient refusing all PO meds and vital signs. Two separate physicians saw patient at bedside and she refused until her son comes in. Will follow. Patient currently stable.

## 2020-03-02 NOTE — PROGRESS NOTE ADULT - PROBLEM SELECTOR PLAN 1
-test BG AC/HS  -C/w Lantus 20 units QAM.  -C/w Humalog ac meals  10 units for now  -C/w Humalog moderate correction scale AC and low HS scale  Discharge plan: basal/bolus  -Even though pt is alert and talking now she still unable to care for her DM and will need someone else to do insulin and DM care if discharge home.   -f/u outpt w/Dr Saul. Will make apt closer to discharge    --Plan discussed with pt/team/RN.   Contact info: [phone] (24/7). pager [phone] -test BG AC/HS  -C/w Lantus 24 units QAM.  -C/w Humalog ac meals  10 units for now  -C/w Humalog moderate correction scale AC and low HS scale  Discharge plan: basal/bolus  -Even though pt is alert and talking now she still unable to care for her DM and will need someone else to do insulin and DM care if discharge home.   -f/u outpt w/Dr Saul. Will make apt closer to discharge    --Plan discussed with pt/team/RN.   Contact info: [phone] (24/7). pager [phone]

## 2020-03-02 NOTE — PROGRESS NOTE ADULT - ASSESSMENT
64F w/ a PMHx arthritis, hypothyroidism, pulmonary HTN, DM on insulin, HTN, COPD/CHRIS on noctournal CPAP and 3L home), RA on chronic prednisone, chronic tremors, SBO s/p ex lap with lysis of adhesions c/b evisceration p/w agitation 3 weeks of unclear etiology, found to be significantly hypothyroid (), MRI brain found to have small right sided SDH, hospital course c/b catatonia improved with Ativan.

## 2020-03-02 NOTE — CHART NOTE - NSCHARTNOTEFT_GEN_A_CORE
Had discussion today with patient's daughter (HCP) Ms. Heavenly Hernández. We discussed disposition goals for her mother. We discussed prolonged hospital course including full neurology, rheumatology, and endocrinology work up. Ms. Hernández expressed concern that her mother's mental status has not improved since she has been in the hospital. We discussed likely diagnoses, including the ones that have been ruled out. Expressed to Ms. Hernández that at this juncture there are no further tests pending to evaluate for change of mental status. Discussed that this could be rapid dementia which may be exacerbated by acute hospital delirium and that we should begin discharge planning, which would be best plan for her mother. Ms. Hernández stated she would have conversation with rest of family about her mother's disposition, including if they would like [facility] vs home with home health aid.     Pending further psychiatry medications, we will continue with Ativan 0.5mg TID.

## 2020-03-02 NOTE — PROGRESS NOTE ADULT - PROBLEM SELECTOR PLAN 5
-soft diet per speech, tolerating well  -lantus 24U qdaily  -humalog 12U pre-meal  -c/w moderate ISS, endocrine adjusting

## 2020-03-02 NOTE — PROGRESS NOTE ADULT - PROBLEM SELECTOR PLAN 1
Likely underlying rapidly progressive dementia, hospital delirium. Most likely rapidly progressing fronto-temporal dementia.   -period of catatonic state which improved with ativan  -per neuro and psych, c/w Ativan 1mg TID, son agreeable   -haldol prn for agitation  -Hypothyroidism possible etiology; , free T4 0.3. Endocrine following. Now on PO 175mcg synthroid qd. However no mental status improvement with synthroid.   -MRI brain 3mm right sided SDH no neurosurgical intervention   -LP 2/6 CSF studies wnl.  -repeat PT evaluation Likely underlying rapidly progressive dementia, hospital delirium. Most likely rapidly progressing fronto-temporal dementia.   -period of catatonic state which improved with ativan  -per neuro and psych, c/w Ativan 0.5mg TID, son agreeable   -psych considering starting new antipsychotic medication - will follow up recommendations.   -Hypothyroidism possible etiology; , free T4 0.3. Endocrine following. Now on PO 175mcg synthroid qd. However no mental status improvement with synthroid.   -MRI brain 3mm right sided SDH no neurosurgical intervention   -LP 2/6 CSF studies wnl.  -repeat PT evaluation

## 2020-03-02 NOTE — PROGRESS NOTE ADULT - ATTENDING COMMENTS
With waxing and waning agitation, but no longer lethargic or catatonic  Psych f/u appreciated, to decrease ativan to 0.5 mg po TID

## 2020-03-02 NOTE — PROGRESS NOTE ADULT - ASSESSMENT
65 y/o F w/h/o uncontrolled T2DM with unknown complications. Also h/o functional paraplegia, hypothyroidism, pulmonary hypertension, COPD, RA on chronic prednisone, SBO s/p ex lap with lysis of adhesions c/b multifocal pneumonia with AMS of unknown etiology. Pt was on catatonic state that resolved with ativan administration and is now more alert and talkative. Glycemic control mostly at goal when pt adherent to DM regimen. Hyperglycemic this am because she ate before agreeing to BG testing. No hypoglycemia. Taking Synthroid with other meds. Awaiting PT eval for discharge plan. BG goal (100-180mg/dl). Will keep present insulin doses for now and evaluate for further adjustments. Will c/w basal/bolus therapy upon discharge     Spent a total time of 25 mins assessing pt/labs/meds and chart and setting plan for DM/hypothyroidism care with team and RN.

## 2020-03-02 NOTE — PROGRESS NOTE ADULT - SUBJECTIVE AND OBJECTIVE BOX
Parminder Cavanaugh  [facility] CMA  [phone]  After 7pm; page night float    Patient is a 64y old  Female who presents with a chief complaint of change in mental status (01 Mar 2020 09:28)      SUBJECTIVE / OVERNIGHT EVENTS:  ADDITIONAL REVIEW OF SYSTEMS:    MEDICATIONS  (STANDING):  budesonide  80 MICROgram(s)/formoterol 4.5 MICROgram(s) Inhaler 2 Puff(s) Inhalation two times a day  dextrose 5%. 1000 milliLiter(s) (50 mL/Hr) IV Continuous <Continuous>  dextrose 50% Injectable 12.5 Gram(s) IV Push once  dextrose 50% Injectable 25 Gram(s) IV Push once  dextrose 50% Injectable 25 Gram(s) IV Push once  heparin  Injectable 5000 Unit(s) SubCutaneous every 8 hours  influenza   Vaccine 0.5 milliLiter(s) IntraMuscular once  insulin glargine Injectable (LANTUS) 24 Unit(s) SubCutaneous <User Schedule>  insulin lispro (HumaLOG) corrective regimen sliding scale   SubCutaneous three times a day before meals  insulin lispro (HumaLOG) corrective regimen sliding scale   SubCutaneous at bedtime  insulin lispro Injectable (HumaLOG) 10 Unit(s) SubCutaneous three times a day with meals  levothyroxine 175 MICROGram(s) Oral daily  LORazepam     Tablet 1 milliGRAM(s) Oral three times a day  losartan 25 milliGRAM(s) Oral daily  metoprolol tartrate 25 milliGRAM(s) Oral two times a day  multivitamin/minerals/iron Oral Solution (CENTRUM) 15 milliLiter(s) Oral daily  nystatin Powder 1 Application(s) Topical every 12 hours  pantoprazole    Tablet 40 milliGRAM(s) Oral before breakfast  predniSONE   Tablet 15 milliGRAM(s) Oral daily  primidone 50 milliGRAM(s) Oral at bedtime  simvastatin 20 milliGRAM(s) Oral at bedtime  thiamine 100 milliGRAM(s) Oral daily    MEDICATIONS  (PRN):  acetaminophen   Tablet .. 975 milliGRAM(s) Oral every 6 hours PRN Moderate Pain (4 - 6), Severe Pain (7 - 10)  acetaminophen  Suppository .. 650 milliGRAM(s) Rectal every 6 hours PRN Temp greater or equal to 38C (100.4F)  albuterol/ipratropium for Nebulization 3 milliLiter(s) Nebulizer every 6 hours PRN Shortness of Breath and/or Wheezing  dextrose 40% Gel 15 Gram(s) Oral once PRN Blood Glucose LESS THAN 70 milliGRAM(s)/deciLiter  glucagon  Injectable 1 milliGRAM(s) IntraMuscular once PRN Glucose <70 milliGRAM(s)/deciLiter  haloperidol    Injectable 5 milliGRAM(s) IV Push every 6 hours PRN Agitation  melatonin 3 milliGRAM(s) Oral at bedtime PRN Insomnia      CAPILLARY BLOOD GLUCOSE      POCT Blood Glucose.: 105 mg/dL (01 Mar 2020 21:32)  POCT Blood Glucose.: 99 mg/dL (01 Mar 2020 18:14)  POCT Blood Glucose.: 154 mg/dL (01 Mar 2020 14:00)  POCT Blood Glucose.: 148 mg/dL (01 Mar 2020 09:02)    I&O's Summary    01 Mar 2020 07:01  -  02 Mar 2020 07:00  --------------------------------------------------------  IN: 370 mL / OUT: 400 mL / NET: -30 mL        PHYSICAL EXAM:  Vital Signs Last 24 Hrs  T(C): 37 (02 Mar 2020 04:22), Max: 37.2 (01 Mar 2020 11:33)  T(F): 98.6 (02 Mar 2020 04:22), Max: 98.9 (01 Mar 2020 11:33)  HR: 87 (02 Mar 2020 05:31) (87 - 111)  BP: 131/92 (02 Mar 2020 04:22) (118/77 - 138/88)  BP(mean): --  RR: 20 (02 Mar 2020 04:22) (18 - 20)  SpO2: 98% (02 Mar 2020 05:31) (94% - 98%)  CONSTITUTIONAL: NAD, well-developed, well-groomed  EYES: PERRLA; conjunctiva and sclera clear  ENMT: Moist oral mucosa, no pharyngeal injection or exudates; normal dentition  NECK: Supple, no palpable masses; no thyromegaly  RESPIRATORY: Normal respiratory effort; lungs are clear to auscultation bilaterally  CARDIOVASCULAR: Regular rate and rhythm, normal S1 and S2, no murmur/rub/gallop; No lower extremity edema; Peripheral pulses are 2+ bilaterally  ABDOMEN: Nontender to palpation, normoactive bowel sounds, no rebound/guarding; No hepatosplenomegaly  MUSCULOSKELETAL:  Normal gait; no clubbing or cyanosis of digits; no joint swelling or tenderness to palpation  PSYCH: A+O to person, place, and time; affect appropriate  NEUROLOGY: CN 2-12 are intact and symmetric; no gross sensory deficits   SKIN: No rashes; no palpable lesions    LABS:                        9.4    9.65  )-----------( 388      ( 01 Mar 2020 06:41 )             31.8     03-01    140  |  103  |  24<H>  ----------------------------<  132<H>  4.4   |  21<L>  |  0.99    Ca    9.3      01 Mar 2020 06:41  Phos  2.8     03-01  Mg     2.0     03-01                  RADIOLOGY & ADDITIONAL TESTS:  Results Reviewed:   Imaging Personally Reviewed:  Electrocardiogram Personally Reviewed:    COORDINATION OF CARE:  Care Discussed with Consultants/Other Providers [Y/N]:  Prior or Outpatient Records Reviewed [Y/N]: Parminder Cavanaugh  [facility] CMA  [phone]  After 7pm; page night float    Patient is a 64y old  Female who presents with a chief complaint of change in mental status (01 Mar 2020 09:28)      SUBJECTIVE / OVERNIGHT EVENTS: No acute events overnight. Pt seen and examined at bedside. No complaints overnight. Pt perseverating on being moved rooms and on recent surgery she had. She would like her daughter to be called.   ADDITIONAL REVIEW OF SYSTEMS: Unable to fully assess due to patient's mental status.     MEDICATIONS  (STANDING):  budesonide  80 MICROgram(s)/formoterol 4.5 MICROgram(s) Inhaler 2 Puff(s) Inhalation two times a day  dextrose 5%. 1000 milliLiter(s) (50 mL/Hr) IV Continuous <Continuous>  dextrose 50% Injectable 12.5 Gram(s) IV Push once  dextrose 50% Injectable 25 Gram(s) IV Push once  dextrose 50% Injectable 25 Gram(s) IV Push once  heparin  Injectable 5000 Unit(s) SubCutaneous every 8 hours  influenza   Vaccine 0.5 milliLiter(s) IntraMuscular once  insulin glargine Injectable (LANTUS) 24 Unit(s) SubCutaneous <User Schedule>  insulin lispro (HumaLOG) corrective regimen sliding scale   SubCutaneous three times a day before meals  insulin lispro (HumaLOG) corrective regimen sliding scale   SubCutaneous at bedtime  insulin lispro Injectable (HumaLOG) 10 Unit(s) SubCutaneous three times a day with meals  levothyroxine 175 MICROGram(s) Oral daily  LORazepam     Tablet 1 milliGRAM(s) Oral three times a day  losartan 25 milliGRAM(s) Oral daily  metoprolol tartrate 25 milliGRAM(s) Oral two times a day  multivitamin/minerals/iron Oral Solution (CENTRUM) 15 milliLiter(s) Oral daily  nystatin Powder 1 Application(s) Topical every 12 hours  pantoprazole    Tablet 40 milliGRAM(s) Oral before breakfast  predniSONE   Tablet 15 milliGRAM(s) Oral daily  primidone 50 milliGRAM(s) Oral at bedtime  simvastatin 20 milliGRAM(s) Oral at bedtime  thiamine 100 milliGRAM(s) Oral daily    MEDICATIONS  (PRN):  acetaminophen   Tablet .. 975 milliGRAM(s) Oral every 6 hours PRN Moderate Pain (4 - 6), Severe Pain (7 - 10)  acetaminophen  Suppository .. 650 milliGRAM(s) Rectal every 6 hours PRN Temp greater or equal to 38C (100.4F)  albuterol/ipratropium for Nebulization 3 milliLiter(s) Nebulizer every 6 hours PRN Shortness of Breath and/or Wheezing  dextrose 40% Gel 15 Gram(s) Oral once PRN Blood Glucose LESS THAN 70 milliGRAM(s)/deciLiter  glucagon  Injectable 1 milliGRAM(s) IntraMuscular once PRN Glucose <70 milliGRAM(s)/deciLiter  haloperidol    Injectable 5 milliGRAM(s) IV Push every 6 hours PRN Agitation  melatonin 3 milliGRAM(s) Oral at bedtime PRN Insomnia      CAPILLARY BLOOD GLUCOSE      POCT Blood Glucose.: 105 mg/dL (01 Mar 2020 21:32)  POCT Blood Glucose.: 99 mg/dL (01 Mar 2020 18:14)  POCT Blood Glucose.: 154 mg/dL (01 Mar 2020 14:00)  POCT Blood Glucose.: 148 mg/dL (01 Mar 2020 09:02)    I&O's Summary    01 Mar 2020 07:01  -  02 Mar 2020 07:00  --------------------------------------------------------  IN: 370 mL / OUT: 400 mL / NET: -30 mL        PHYSICAL EXAM:  Vital Signs Last 24 Hrs  T(C): 37 (02 Mar 2020 04:22), Max: 37.2 (01 Mar 2020 11:33)  T(F): 98.6 (02 Mar 2020 04:22), Max: 98.9 (01 Mar 2020 11:33)  HR: 87 (02 Mar 2020 05:31) (87 - 111)  BP: 131/92 (02 Mar 2020 04:22) (118/77 - 138/88)  BP(mean): --  RR: 20 (02 Mar 2020 04:22) (18 - 20)  SpO2: 98% (02 Mar 2020 05:31) (94% - 98%)  CONSTITUTIONAL: NAD, well-developed, well-groomed  EYES: PERRLA; conjunctiva and sclera clear  ENMT: Moist oral mucosa, no pharyngeal injection or exudates; normal dentition  RESPIRATORY: Normal respiratory effort; lungs are clear to auscultation bilaterally  CARDIOVASCULAR: Regular rate and rhythm, normal S1 and S2, no murmur/rub/gallop; No lower extremity edema; Peripheral pulses are 2+ bilaterally  ABDOMEN: Nontender to palpation, normoactive bowel sounds, no rebound/guarding; No hepatosplenomegaly      LABS:                        9.4    9.65  )-----------( 388      ( 01 Mar 2020 06:41 )             31.8     03-01    140  |  103  |  24<H>  ----------------------------<  132<H>  4.4   |  21<L>  |  0.99    Ca    9.3      01 Mar 2020 06:41  Phos  2.8     03-01  Mg     2.0     03-01                  RADIOLOGY & ADDITIONAL TESTS:  Results Reviewed:   Imaging Personally Reviewed:  Electrocardiogram Personally Reviewed:    COORDINATION OF CARE:  Care Discussed with Consultants/Other Providers [Y/N]:  Prior or Outpatient Records Reviewed [Y/N]:

## 2020-03-02 NOTE — PROGRESS NOTE ADULT - SUBJECTIVE AND OBJECTIVE BOX
DIABETES FOLLOW UP NOTE: Saw pt earlier today  INTERVAL HX: 65 y/o F w/h/o uncontrolled T2DM with unknown complications. Also h/o functional paraplegia, hypothyroidism, pulmonary hypertension, COPD, RA on chronic prednisone, SBO s/p ex lap with lysis of adhesions c/b multifocal pneumonia with AMS of unknown etiology. Pt was on catatonic state that resolved with ativan administration and is now more alert and talkative. Per staff pt still with AMS refusing meds on and off. RN states pt didn't want to have BG tested earlier today so task delayed to late morning after pt have breakfast. Glycemic control mostly at goal when pt adherent to DM regimen. Hyperglycemic this am because she ate before agreeing to BG testing. No hypoglycemia. Restarted PO synthroid and Prednisone but taking Synthroid with other meds. Per primary team discharge plan to rehab awaiting PT eval. Pt states feeling well and going home today because she is organizing her nephews wedding.  Able to answer yes/no to simple questions        Review of Systems:  General; As above  Cardiovascular: No chest pain, palpitations  Respiratory: No SOB, no cough  GI: No nausea, vomiting, abdominal pain  Endocrine: no polyuria, polydipsia or S&Sx of hypoglycemia    Allergies    Depakote (Other)  Seroquel (Other (Severe))    Intolerances      MEDICATIONS:  insulin glargine Injectable (LANTUS) 24 Unit(s) SubCutaneous <User Schedule>  insulin lispro (HumaLOG) corrective regimen sliding scale   SubCutaneous three times a day before meals  insulin lispro (HumaLOG) corrective regimen sliding scale   SubCutaneous at bedtime  insulin lispro Injectable (HumaLOG) 10 Unit(s) SubCutaneous three times a day with meals  levothyroxine 175 MICROGram(s) Oral daily  LORazepam     Tablet 0.5 milliGRAM(s) Oral three times a day  LORazepam   Injectable 0.5 milliGRAM(s) IV Push three times a day  predniSONE   Tablet 15 milliGRAM(s) Oral daily  simvastatin 20 milliGRAM(s) Oral at bedtime        PHYSICAL EXAM:  VITALS: T(C): 37.1 (03-02-20 @ 14:17)  T(F): 98.8 (03-02-20 @ 14:17), Max: 98.8 (03-02-20 @ 14:17)  HR: 106 (03-02-20 @ 14:17) (87 - 111)  BP: 131/92 (03-02-20 @ 04:22) (131/92 - 138/88)  RR:  (18 - 20)  SpO2:  (94% - 98%)  Wt(kg): --  GENERAL: Female laying in bed in NAD  Abdomen: Soft, nontender, non distended, obese. Abdominal dressing D&I  Extremities: Warm, no edema in all 4 exts  NEURO: Alert and able to answer simple questions Has trouble concentrating     LABS:  POCT Blood Glucose.: 182 mg/dL (03-02-20 @ 13:07)  POCT Blood Glucose.: 258 mg/dL (03-02-20 @ 10:13)  POCT Blood Glucose.: 105 mg/dL (03-01-20 @ 21:32)  POCT Blood Glucose.: 99 mg/dL (03-01-20 @ 18:14)  POCT Blood Glucose.: 154 mg/dL (03-01-20 @ 14:00)  POCT Blood Glucose.: 148 mg/dL (03-01-20 @ 09:02)  POCT Blood Glucose.: 140 mg/dL (03-01-20 @ 05:40)  POCT Blood Glucose.: 131 mg/dL (02-29-20 @ 21:42)  POCT Blood Glucose.: 136 mg/dL (02-29-20 @ 17:54)  POCT Blood Glucose.: 250 mg/dL (02-29-20 @ 14:13)  POCT Blood Glucose.: 253 mg/dL (02-29-20 @ 08:10)  POCT Blood Glucose.: 221 mg/dL (02-28-20 @ 17:17)                            9.4    9.65  )-----------( 388      ( 01 Mar 2020 06:41 )             31.8       03-01    140  |  103  |  24<H>  ----------------------------<  132<H>  4.4   |  21<L>  |  0.99    EGFR if : 71    Ca    9.3      03-01  Mg     2.0     03-01  Phos  2.8     03-01        Thyroid Function Tests:  02-23 @ 13:25 TSH -- FreeT4 -- T3 53 Anti TPO -- Anti Thyroglobulin Ab -- TSI --  02-13 @ 09:05 TSH 17.80 FreeT4 -- T3 -- Anti TPO -- Anti Thyroglobulin Ab -- TSI --      Hemoglobin A1C, Whole Blood: 9.0 % <H> [4.0 - 5.6] (01-28-20 @ 19:30)  Hemoglobin A1C, Whole Blood: 9.2 % <H> [4.0 - 5.6] (01-27-20 @ 17:36)

## 2020-03-03 LAB
ALBUMIN SERPL ELPH-MCNC: 3.5 G/DL — SIGNIFICANT CHANGE UP (ref 3.3–5)
ALP SERPL-CCNC: 90 U/L — SIGNIFICANT CHANGE UP (ref 40–120)
ALT FLD-CCNC: 29 U/L — SIGNIFICANT CHANGE UP (ref 10–45)
ANION GAP SERPL CALC-SCNC: 17 MMOL/L — SIGNIFICANT CHANGE UP (ref 5–17)
AST SERPL-CCNC: 22 U/L — SIGNIFICANT CHANGE UP (ref 10–40)
BILIRUB SERPL-MCNC: 0.1 MG/DL — LOW (ref 0.2–1.2)
BUN SERPL-MCNC: 20 MG/DL — SIGNIFICANT CHANGE UP (ref 7–23)
CALCIUM SERPL-MCNC: 9.1 MG/DL — SIGNIFICANT CHANGE UP (ref 8.4–10.5)
CHLORIDE SERPL-SCNC: 102 MMOL/L — SIGNIFICANT CHANGE UP (ref 96–108)
CO2 SERPL-SCNC: 17 MMOL/L — LOW (ref 22–31)
CREAT SERPL-MCNC: 0.99 MG/DL — SIGNIFICANT CHANGE UP (ref 0.5–1.3)
GLUCOSE BLDC GLUCOMTR-MCNC: 129 MG/DL — HIGH (ref 70–99)
GLUCOSE BLDC GLUCOMTR-MCNC: 137 MG/DL — HIGH (ref 70–99)
GLUCOSE BLDC GLUCOMTR-MCNC: 169 MG/DL — HIGH (ref 70–99)
GLUCOSE BLDC GLUCOMTR-MCNC: 171 MG/DL — HIGH (ref 70–99)
GLUCOSE SERPL-MCNC: 167 MG/DL — HIGH (ref 70–99)
HCT VFR BLD CALC: 33.2 % — LOW (ref 34.5–45)
HGB BLD-MCNC: 9.9 G/DL — LOW (ref 11.5–15.5)
MCHC RBC-ENTMCNC: 29.7 PG — SIGNIFICANT CHANGE UP (ref 27–34)
MCHC RBC-ENTMCNC: 29.8 GM/DL — LOW (ref 32–36)
MCV RBC AUTO: 99.7 FL — SIGNIFICANT CHANGE UP (ref 80–100)
NRBC # BLD: 0 /100 WBCS — SIGNIFICANT CHANGE UP (ref 0–0)
PLATELET # BLD AUTO: 385 K/UL — SIGNIFICANT CHANGE UP (ref 150–400)
POTASSIUM SERPL-MCNC: 4.3 MMOL/L — SIGNIFICANT CHANGE UP (ref 3.5–5.3)
POTASSIUM SERPL-SCNC: 4.3 MMOL/L — SIGNIFICANT CHANGE UP (ref 3.5–5.3)
PROT SERPL-MCNC: 6.9 G/DL — SIGNIFICANT CHANGE UP (ref 6–8.3)
RBC # BLD: 3.33 M/UL — LOW (ref 3.8–5.2)
RBC # FLD: 14.4 % — SIGNIFICANT CHANGE UP (ref 10.3–14.5)
SODIUM SERPL-SCNC: 136 MMOL/L — SIGNIFICANT CHANGE UP (ref 135–145)
WBC # BLD: 10.42 K/UL — SIGNIFICANT CHANGE UP (ref 3.8–10.5)
WBC # FLD AUTO: 10.42 K/UL — SIGNIFICANT CHANGE UP (ref 3.8–10.5)

## 2020-03-03 PROCEDURE — 99232 SBSQ HOSP IP/OBS MODERATE 35: CPT

## 2020-03-03 PROCEDURE — 99232 SBSQ HOSP IP/OBS MODERATE 35: CPT | Mod: GC

## 2020-03-03 RX ORDER — PANTOPRAZOLE SODIUM 20 MG/1
40 TABLET, DELAYED RELEASE ORAL
Refills: 0 | Status: DISCONTINUED | OUTPATIENT
Start: 2020-03-03 | End: 2020-03-27

## 2020-03-03 RX ADMIN — Medication 10 UNIT(S): at 09:29

## 2020-03-03 RX ADMIN — Medication 25 MILLIGRAM(S): at 06:27

## 2020-03-03 RX ADMIN — HEPARIN SODIUM 5000 UNIT(S): 5000 INJECTION INTRAVENOUS; SUBCUTANEOUS at 13:08

## 2020-03-03 RX ADMIN — INSULIN GLARGINE 24 UNIT(S): 100 INJECTION, SOLUTION SUBCUTANEOUS at 09:42

## 2020-03-03 RX ADMIN — Medication 15 MILLIGRAM(S): at 06:27

## 2020-03-03 RX ADMIN — Medication 2: at 18:15

## 2020-03-03 RX ADMIN — LOSARTAN POTASSIUM 25 MILLIGRAM(S): 100 TABLET, FILM COATED ORAL at 06:28

## 2020-03-03 RX ADMIN — HEPARIN SODIUM 5000 UNIT(S): 5000 INJECTION INTRAVENOUS; SUBCUTANEOUS at 00:25

## 2020-03-03 RX ADMIN — Medication 15 MILLILITER(S): at 13:07

## 2020-03-03 RX ADMIN — Medication 0.5 MILLIGRAM(S): at 22:32

## 2020-03-03 RX ADMIN — Medication 0.5 MILLIGRAM(S): at 05:07

## 2020-03-03 RX ADMIN — PANTOPRAZOLE SODIUM 40 MILLIGRAM(S): 20 TABLET, DELAYED RELEASE ORAL at 06:28

## 2020-03-03 RX ADMIN — HEPARIN SODIUM 5000 UNIT(S): 5000 INJECTION INTRAVENOUS; SUBCUTANEOUS at 05:09

## 2020-03-03 RX ADMIN — Medication 100 MILLIGRAM(S): at 13:05

## 2020-03-03 RX ADMIN — NYSTATIN CREAM 1 APPLICATION(S): 100000 CREAM TOPICAL at 06:28

## 2020-03-03 RX ADMIN — Medication 2: at 09:29

## 2020-03-03 RX ADMIN — HEPARIN SODIUM 5000 UNIT(S): 5000 INJECTION INTRAVENOUS; SUBCUTANEOUS at 22:31

## 2020-03-03 RX ADMIN — Medication 175 MICROGRAM(S): at 06:28

## 2020-03-03 RX ADMIN — Medication 0.5 MILLIGRAM(S): at 16:12

## 2020-03-03 RX ADMIN — NYSTATIN CREAM 1 APPLICATION(S): 100000 CREAM TOPICAL at 17:48

## 2020-03-03 NOTE — PROGRESS NOTE ADULT - PROBLEM SELECTOR PLAN 1
-test BG AC/HS  -C/w Lantus 20 units QAM.  -C/w Humalog ac meals 10 units for now  -C/w Humalog moderate correction scale AC and low HS scale  Discharge plan: basal/bolus  -Even though pt is alert and talking now she still unable to care for her DM and will need someone else to do insulin and DM care if discharge home.   -f/u outpt w/Dr Saul. Will make apt closer to discharge    --Plan discussed with pt/team/RN.   Contact info: [phone] (24/7). pager [phone] -test BG AC/HS  -C/w Lantus 24 units QAM.  -C/w Humalog ac meals 10 units for now  -C/w Humalog moderate correction scale AC and low HS scale  Discharge plan: basal/bolus  -Even though pt is alert and talking now she still unable to care for her DM and will need someone else to do insulin and DM care if discharge home.   -f/u outpt w/Dr Saul. Will make apt closer to discharge    --Plan discussed with pt/team/RN.   Contact info: [phone] (24/7). pager [phone]

## 2020-03-03 NOTE — PROGRESS NOTE ADULT - SUBJECTIVE AND OBJECTIVE BOX
DIABETES FOLLOW UP NOTE: Saw pt earlier today  INTERVAL HX: 65 y/o F w/h/o uncontrolled T2DM with unknown complications. Also h/o functional paraplegia, hypothyroidism, pulmonary hypertension, COPD, RA on chronic prednisone, SBO s/p ex lap with lysis of adhesions c/b multifocal pneumonia with AMS of unknown etiology. Pt was on catatonic state that resolved with ativan administration and is now more alert and talkative. Glycemic control mostly at goal when pt adherent to DM regimen. No hypoglycemia. Tolerating POs and noted overnight abdominal paincomplaint. Pt denies any pain at time of visit. Per staff tolerating POs. Still  taking Synthroid with other meds including Protonix in am. Pt states she has been discharged home today and is waiting for family to pick her up and for  to set up transportation. Per team, likely discharge to [facility] when ready but need to f/u with family and PT.      Review of Systems:  General: As above, Pt able to answer to ROS  Cardiovascular: No chest pain, palpitations  Respiratory: No SOB, no cough  GI: No nausea, vomiting, abdominal pain  Endocrine: no polyuria, polydipsia or S&Sx of hypoglycemia    Allergies    Depakote (Other)  Seroquel (Other (Severe))    Intolerances      MEDICATIONS:  insulin glargine Injectable (LANTUS) 24 Unit(s) SubCutaneous <User Schedule>  insulin lispro (HumaLOG) corrective regimen sliding scale   SubCutaneous three times a day before meals  insulin lispro (HumaLOG) corrective regimen sliding scale   SubCutaneous at bedtime  insulin lispro Injectable (HumaLOG) 10 Unit(s) SubCutaneous three times a day with meals  levothyroxine 175 MICROGram(s) Oral daily  predniSONE   Tablet 15 milliGRAM(s) Oral daily  simvastatin 20 milliGRAM(s) Oral at bedtime        PHYSICAL EXAM:  VITALS: T(C): 37.4 (03-03-20 @ 04:47)  T(F): 99.3 (03-03-20 @ 04:47), Max: 99.3 (03-03-20 @ 04:47)  HR: 111 (03-03-20 @ 04:47) (101 - 111)  BP: 131/77 (03-03-20 @ 04:47) (112/75 - 140/77)  RR:  (20 - 22)  SpO2:  (95% - 95%)  Wt(kg): --  GENERAL: Female laying in bed in NAD  Abdomen: Soft, nontender, non distended, obese  Extremities: Warm, no edema in all 4 exts  NEURO: Alert, knows she is in hospital but insisting she has been discharged and is waiting for family to pick her up.    LABS:  POCT Blood Glucose.: 129 mg/dL (03-03-20 @ 13:06)  POCT Blood Glucose.: 171 mg/dL (03-03-20 @ 09:20)  POCT Blood Glucose.: 179 mg/dL (03-02-20 @ 22:47)  POCT Blood Glucose.: 123 mg/dL (03-02-20 @ 18:30)  POCT Blood Glucose.: 182 mg/dL (03-02-20 @ 13:07)  POCT Blood Glucose.: 258 mg/dL (03-02-20 @ 10:13)  POCT Blood Glucose.: 105 mg/dL (03-01-20 @ 21:32)  POCT Blood Glucose.: 99 mg/dL (03-01-20 @ 18:14)  POCT Blood Glucose.: 154 mg/dL (03-01-20 @ 14:00)  POCT Blood Glucose.: 148 mg/dL (03-01-20 @ 09:02)  POCT Blood Glucose.: 140 mg/dL (03-01-20 @ 05:40)  POCT Blood Glucose.: 131 mg/dL (02-29-20 @ 21:42)  POCT Blood Glucose.: 136 mg/dL (02-29-20 @ 17:54)                            9.9    10.42 )-----------( 385      ( 03 Mar 2020 10:46 )             33.2       03-03    136  |  102  |  20  ----------------------------<  167<H>  4.3   |  17<L>  |  0.99    EGFR if : 71  EGFR if non : 61    Ca    9.1      03-03  Mg     2.0     03-01  Phos  2.8     03-01    TPro  6.9  /  Alb  3.5  /  TBili  0.1<L>  /  DBili  x   /  AST  22  /  ALT  29  /  AlkPhos  90  03-03      Thyroid Function Tests:  02-23 @ 13:25 TSH -- FreeT4 -- T3 53 Anti TPO -- Anti Thyroglobulin Ab -- TSI --  02-13 @ 09:05 TSH 17.80 FreeT4 -- T3 -- Anti TPO -- Anti Thyroglobulin Ab -- TSI --      Hemoglobin A1C, Whole Blood: 9.0 % <H> [4.0 - 5.6] (01-28-20 @ 19:30)  Hemoglobin A1C, Whole Blood: 9.2 % <H> [4.0 - 5.6] (01-27-20 @ 17:36)

## 2020-03-03 NOTE — PROGRESS NOTE ADULT - PROBLEM SELECTOR PLAN 7
most likely contaminant  -Bcx + for GPR on 1/12, 1 out of 2 bottles. +Bcx from 1/12 sent to [facility] laboratories, no further speciation available. .   - repeat blood cultures negative, ID following resolved.

## 2020-03-03 NOTE — PROGRESS NOTE ADULT - ASSESSMENT
63 y/o F w/h/o uncontrolled T2DM with unknown complications. Also h/o functional paraplegia, hypothyroidism, pulmonary hypertension, COPD, RA on chronic prednisone, SBO s/p ex lap with lysis of adhesions c/b multifocal pneumonia with AMS of unknown etiology. Pt was on catatonic state that resolved with ativan administration and is now more alert and talkative but still confused. Glycemic control mostly at goal when pt adherent to diet and DM regimen. No hypoglycemia. Tolerating POs with overnight c/o abdominal pain> pt refused physical eval last night but denies pain at time of visit. Pt likely discharge to [facility] when ready but need to f/u with family and PT. BG goal (100-180mg/dl). Will keep present insulin doses for now and evaluate for further adjustments. Will c/w basal/bolus therapy upon discharge. Still  taking Synthroid with other meds including Protonix in am. Noted multivitamins given now at 12noon.     Spoke to pharmacy/team and RN to move am Protonix dosing to 12 noon so it doesn't interfere with synthroid absorption     Spent a total time of 25 mins assessing pt/labs/meds and chart and setting plan for DM/hypothyroidism care with team and RN.

## 2020-03-03 NOTE — PROGRESS NOTE ADULT - SUBJECTIVE AND OBJECTIVE BOX
Parminder Cavanaugh, pgy 1  [facility] Team 4  [phone]  After 7pm; page night float    Patient is a 64y old  Female who presents with a chief complaint of change in mental status (02 Mar 2020 16:37)      SUBJECTIVE / OVERNIGHT EVENTS: Overnight pt had abdominal pain though refused any physical exam or for night float team to talk to her. She also refused medications. Pt seen at bedside. Refused to talk to me and refused physical exam.     ADDITIONAL REVIEW OF SYSTEMS: unable to assess    MEDICATIONS  (STANDING):  budesonide  80 MICROgram(s)/formoterol 4.5 MICROgram(s) Inhaler 2 Puff(s) Inhalation two times a day  dextrose 5%. 1000 milliLiter(s) (50 mL/Hr) IV Continuous <Continuous>  dextrose 50% Injectable 12.5 Gram(s) IV Push once  dextrose 50% Injectable 25 Gram(s) IV Push once  dextrose 50% Injectable 25 Gram(s) IV Push once  heparin  Injectable 5000 Unit(s) SubCutaneous every 8 hours  influenza   Vaccine 0.5 milliLiter(s) IntraMuscular once  insulin glargine Injectable (LANTUS) 24 Unit(s) SubCutaneous <User Schedule>  insulin lispro (HumaLOG) corrective regimen sliding scale   SubCutaneous three times a day before meals  insulin lispro (HumaLOG) corrective regimen sliding scale   SubCutaneous at bedtime  insulin lispro Injectable (HumaLOG) 10 Unit(s) SubCutaneous three times a day with meals  levothyroxine 175 MICROGram(s) Oral daily  LORazepam     Tablet 0.5 milliGRAM(s) Oral three times a day  LORazepam   Injectable 0.5 milliGRAM(s) IV Push three times a day  losartan 25 milliGRAM(s) Oral daily  metoprolol tartrate 25 milliGRAM(s) Oral two times a day  multivitamin/minerals/iron Oral Solution (CENTRUM) 15 milliLiter(s) Oral daily  nystatin Powder 1 Application(s) Topical every 12 hours  pantoprazole    Tablet 40 milliGRAM(s) Oral before breakfast  predniSONE   Tablet 15 milliGRAM(s) Oral daily  primidone 50 milliGRAM(s) Oral at bedtime  simvastatin 20 milliGRAM(s) Oral at bedtime  thiamine 100 milliGRAM(s) Oral daily    MEDICATIONS  (PRN):  acetaminophen   Tablet .. 975 milliGRAM(s) Oral every 6 hours PRN Moderate Pain (4 - 6), Severe Pain (7 - 10)  acetaminophen  Suppository .. 650 milliGRAM(s) Rectal every 6 hours PRN Temp greater or equal to 38C (100.4F)  albuterol/ipratropium for Nebulization 3 milliLiter(s) Nebulizer every 6 hours PRN Shortness of Breath and/or Wheezing  dextrose 40% Gel 15 Gram(s) Oral once PRN Blood Glucose LESS THAN 70 milliGRAM(s)/deciLiter  glucagon  Injectable 1 milliGRAM(s) IntraMuscular once PRN Glucose <70 milliGRAM(s)/deciLiter  melatonin 3 milliGRAM(s) Oral at bedtime PRN Insomnia      CAPILLARY BLOOD GLUCOSE      POCT Blood Glucose.: 179 mg/dL (02 Mar 2020 22:47)  POCT Blood Glucose.: 123 mg/dL (02 Mar 2020 18:30)  POCT Blood Glucose.: 182 mg/dL (02 Mar 2020 13:07)  POCT Blood Glucose.: 258 mg/dL (02 Mar 2020 10:13)    I&O's Summary    02 Mar 2020 07:01  -  03 Mar 2020 07:00  --------------------------------------------------------  IN: 600 mL / OUT: 0 mL / NET: 600 mL        PHYSICAL EXAM:  Vital Signs Last 24 Hrs  T(C): 37.4 (03 Mar 2020 04:47), Max: 37.4 (03 Mar 2020 04:47)  T(F): 99.3 (03 Mar 2020 04:47), Max: 99.3 (03 Mar 2020 04:47)  HR: 111 (03 Mar 2020 04:47) (101 - 111)  BP: 131/77 (03 Mar 2020 04:47) (112/75 - 140/77)  BP(mean): --  RR: 22 (03 Mar 2020 04:47) (20 - 22)  SpO2: 95% (03 Mar 2020 04:47) (95% - 95%)  CONSTITUTIONAL: unable to assess      LABS:      pt refused.                 RADIOLOGY & ADDITIONAL TESTS:  Results Reviewed:   Imaging Personally Reviewed:  Electrocardiogram Personally Reviewed:    COORDINATION OF CARE:  Care Discussed with Consultants/Other Providers [Y/N]:  Prior or Outpatient Records Reviewed [Y/N]:

## 2020-03-03 NOTE — PROGRESS NOTE ADULT - PROBLEM SELECTOR PLAN 1
Likely underlying rapidly progressive dementia, hospital delirium.   -period of catatonic state which improved with ativan  -per neuro and psych, c/w Ativan 0.5mg TID, son agreeable. Will give 1mg IV ativan for agitation.   -psych considering starting new antipsychotic medication - will follow up recommendations.   -Hypothyroidism possible etiology; , free T4 0.3. Endocrine following. Now on PO 175mcg synthroid qd. However no mental status improvement with synthroid.   -MRI brain 3mm right sided SDH no neurosurgical intervention   -LP 2/6 CSF studies wnl.  -repeat PT evaluation; pt refusing  -Had long conversation with daughter yesterday around pt disposition; will follow up.

## 2020-03-03 NOTE — PROGRESS NOTE ADULT - PROBLEM SELECTOR PLAN 2
-Pt currently on PO Synthroid taking it with other meds due to pt issues taking meds on time and refusing some on and off.   -Reviewed with pharmacy all meds pt takes in am and found that for better synthroid adsorption pt could take Protonix at 12 noon instead of am as she does with multivitamins.   -C/w Synthroid 175 mcg PO daily.   -Make sure med is given in an empty stomach  -Re test  TFT in 4-6 weeks.

## 2020-03-03 NOTE — PROGRESS NOTE ADULT - ATTENDING COMMENTS
Pt remains stable. Psych following for medication re-adjustment. Continuing on Ativan 0.5mg TID for now pending further input.   Dispo planning

## 2020-03-03 NOTE — PROGRESS NOTE ADULT - PROBLEM SELECTOR PLAN 5
-soft diet per speech, tolerating well  -lantus 24U qdaily  -humalog 12U pre-meal  -c/w moderate ISS, endocrine adjusting  -pt refuses insulin often.

## 2020-03-04 DIAGNOSIS — F09 UNSPECIFIED MENTAL DISORDER DUE TO KNOWN PHYSIOLOGICAL CONDITION: ICD-10-CM

## 2020-03-04 LAB
GLUCOSE BLDC GLUCOMTR-MCNC: 141 MG/DL — HIGH (ref 70–99)
GLUCOSE BLDC GLUCOMTR-MCNC: 149 MG/DL — HIGH (ref 70–99)
GLUCOSE BLDC GLUCOMTR-MCNC: 153 MG/DL — HIGH (ref 70–99)
GLUCOSE BLDC GLUCOMTR-MCNC: 161 MG/DL — HIGH (ref 70–99)
GLUCOSE BLDC GLUCOMTR-MCNC: 171 MG/DL — HIGH (ref 70–99)

## 2020-03-04 PROCEDURE — 99231 SBSQ HOSP IP/OBS SF/LOW 25: CPT

## 2020-03-04 PROCEDURE — 99232 SBSQ HOSP IP/OBS MODERATE 35: CPT | Mod: GC

## 2020-03-04 RX ADMIN — HEPARIN SODIUM 5000 UNIT(S): 5000 INJECTION INTRAVENOUS; SUBCUTANEOUS at 17:47

## 2020-03-04 RX ADMIN — SIMVASTATIN 20 MILLIGRAM(S): 20 TABLET, FILM COATED ORAL at 21:45

## 2020-03-04 RX ADMIN — PRIMIDONE 50 MILLIGRAM(S): 250 TABLET ORAL at 21:46

## 2020-03-04 RX ADMIN — Medication 0.5 MILLIGRAM(S): at 15:30

## 2020-03-04 RX ADMIN — Medication 0.5 MILLIGRAM(S): at 21:45

## 2020-03-04 RX ADMIN — NYSTATIN CREAM 1 APPLICATION(S): 100000 CREAM TOPICAL at 17:48

## 2020-03-04 RX ADMIN — INSULIN GLARGINE 24 UNIT(S): 100 INJECTION, SOLUTION SUBCUTANEOUS at 05:37

## 2020-03-04 RX ADMIN — HEPARIN SODIUM 5000 UNIT(S): 5000 INJECTION INTRAVENOUS; SUBCUTANEOUS at 05:34

## 2020-03-04 RX ADMIN — Medication 10 UNIT(S): at 09:01

## 2020-03-04 RX ADMIN — Medication 2: at 18:07

## 2020-03-04 RX ADMIN — NYSTATIN CREAM 1 APPLICATION(S): 100000 CREAM TOPICAL at 05:34

## 2020-03-04 RX ADMIN — Medication 2: at 09:01

## 2020-03-04 RX ADMIN — Medication 0.5 MILLIGRAM(S): at 05:55

## 2020-03-04 NOTE — PROGRESS NOTE ADULT - SUBJECTIVE AND OBJECTIVE BOX
DIABETES FOLLOW UP NOTE: Saw pt earlier today  INTERVAL HX: 63 y/o F w/h/o uncontrolled T2DM with unknown complications. Also h/o functional paraplegia, hypothyroidism, pulmonary hypertension, COPD, RA on chronic prednisone, SBO s/p ex lap with lysis of adhesions c/b multifocal pneumonia with AMS of unknown etiology. Pt was on catatonic state that resolved with ativan administration and is now more alert and talkative but still confused. Pt refused all PO meds this am so didn't get Synthroid. Took insulin for breakfast but refused to eat for lunch. Glycemic control mostly at goal on present insulin doses. No hypoglycemia. Tolerating POs but refusing to eat on and off.     Review of Systems:  General: As above  No answering to questions today    Allergies    Depakote (Other)  Seroquel (Other (Severe))    Intolerances      MEDICATIONS:  insulin glargine Injectable (LANTUS) 24 Unit(s) SubCutaneous <User Schedule>  insulin lispro (HumaLOG) corrective regimen sliding scale   SubCutaneous three times a day before meals  insulin lispro (HumaLOG) corrective regimen sliding scale   SubCutaneous at bedtime  insulin lispro Injectable (HumaLOG) 10 Unit(s) SubCutaneous three times a day with meals  levothyroxine 175 MICROGram(s) Oral daily  predniSONE   Tablet 15 milliGRAM(s) Oral daily  simvastatin 20 milliGRAM(s) Oral at bedtime        PHYSICAL EXAM:  VITALS: T(C): 36.9 (03-04-20 @ 13:43)  T(F): 98.4 (03-04-20 @ 13:43), Max: 98.4 (03-04-20 @ 13:43)  HR: 101 (03-04-20 @ 13:43) (101 - 110)  BP: 128/65 (03-04-20 @ 13:43) (125/84 - 144/79)  RR:  (18 - 20)  SpO2:  (95% - 98%)  Wt(kg): --  GENERAL: Female laying in bed in NAD. Son at bedside  Abdomen: Soft, nontender, non distended, obese with surgical dressing D&I  Extremities: Warm, no edema in all 4 exts  NEURO: A&O X3    LABS:  POCT Blood Glucose.: 141 mg/dL (03-04-20 @ 15:24)  POCT Blood Glucose.: 161 mg/dL (03-04-20 @ 08:57)  POCT Blood Glucose.: 171 mg/dL (03-04-20 @ 05:36)  POCT Blood Glucose.: 137 mg/dL (03-03-20 @ 21:45)  POCT Blood Glucose.: 169 mg/dL (03-03-20 @ 18:02)  POCT Blood Glucose.: 129 mg/dL (03-03-20 @ 13:06)  POCT Blood Glucose.: 171 mg/dL (03-03-20 @ 09:20)  POCT Blood Glucose.: 179 mg/dL (03-02-20 @ 22:47)  POCT Blood Glucose.: 123 mg/dL (03-02-20 @ 18:30)  POCT Blood Glucose.: 182 mg/dL (03-02-20 @ 13:07)  POCT Blood Glucose.: 258 mg/dL (03-02-20 @ 10:13)  POCT Blood Glucose.: 105 mg/dL (03-01-20 @ 21:32)  POCT Blood Glucose.: 99 mg/dL (03-01-20 @ 18:14)                            9.9    10.42 )-----------( 385      ( 03 Mar 2020 10:46 )             33.2       03-03    136  |  102  |  20  ----------------------------<  167<H>  4.3   |  17<L>  |  0.99    EGFR if : 71      Ca    9.1      03-03    TPro  6.9  /  Alb  3.5  /  TBili  0.1<L>  /  DBili  x   /  AST  22  /  ALT  29  /  AlkPhos  90  03-03      Thyroid Function Tests:  02-23 @ 13:25 TSH -- FreeT4 -- T3 53 Anti TPO -- Anti Thyroglobulin Ab -- TSI --  02-13 @ 09:05 TSH 17.80 FreeT4 -- T3 -- Anti TPO -- Anti Thyroglobulin Ab -- TSI --      Hemoglobin A1C, Whole Blood: 9.0 % <H> [4.0 - 5.6] (01-28-20 @ 19:30)  Hemoglobin A1C, Whole Blood: 9.2 % <H> [4.0 - 5.6] (01-27-20 @ 17:36)

## 2020-03-04 NOTE — PROGRESS NOTE ADULT - ASSESSMENT
65 y/o F w/h/o uncontrolled T2DM with unknown complications. Also h/o functional paraplegia, hypothyroidism, pulmonary hypertension, COPD, RA on chronic prednisone, SBO s/p ex lap with lysis of adhesions c/b multifocal pneumonia with AMS of unknown etiology. Pt was on catatonic state that resolved with ativan administration and is now more alert and talkative but still confused. Pt refused all PO meds this am so didn't get Synthroid. Took insulin for breakfast but refused to eat for lunch. Glycemic control mostly at goal on present insulin doses. No hypoglycemia. Tolerating POs but refusing to eat on and off.      Son made aware of issues encounter with administration of synthroid. Tried to administer meds while son was here but pt still refused.     Spent a total time of >15 mins assessing pt/labs/meds and chart and setting plan for DM/hypothyroidism care with son, team and RN.

## 2020-03-04 NOTE — PROGRESS NOTE ADULT - SUBJECTIVE AND OBJECTIVE BOX
Parminder Cavanaugh, pgy 1  [facility]  [phone]  After 7pm; page night float    Patient is a 64y old  Female who presents with a chief complaint of change in mental status (03 Mar 2020 15:35)      SUBJECTIVE / OVERNIGHT EVENTS: Pt refusing medication. Unable to assess this morning - pt shouting that I am not her doctor.     ADDITIONAL REVIEW OF SYSTEMS: unable to assess.     MEDICATIONS  (STANDING):  budesonide  80 MICROgram(s)/formoterol 4.5 MICROgram(s) Inhaler 2 Puff(s) Inhalation two times a day  dextrose 5%. 1000 milliLiter(s) (50 mL/Hr) IV Continuous <Continuous>  dextrose 50% Injectable 12.5 Gram(s) IV Push once  dextrose 50% Injectable 25 Gram(s) IV Push once  dextrose 50% Injectable 25 Gram(s) IV Push once  heparin  Injectable 5000 Unit(s) SubCutaneous every 8 hours  influenza   Vaccine 0.5 milliLiter(s) IntraMuscular once  insulin glargine Injectable (LANTUS) 24 Unit(s) SubCutaneous <User Schedule>  insulin lispro (HumaLOG) corrective regimen sliding scale   SubCutaneous three times a day before meals  insulin lispro (HumaLOG) corrective regimen sliding scale   SubCutaneous at bedtime  insulin lispro Injectable (HumaLOG) 10 Unit(s) SubCutaneous three times a day with meals  levothyroxine 175 MICROGram(s) Oral daily  LORazepam     Tablet 0.5 milliGRAM(s) Oral three times a day  LORazepam   Injectable 0.5 milliGRAM(s) IV Push three times a day  losartan 25 milliGRAM(s) Oral daily  metoprolol tartrate 25 milliGRAM(s) Oral two times a day  multivitamin/minerals/iron Oral Solution (CENTRUM) 15 milliLiter(s) Oral daily  nystatin Powder 1 Application(s) Topical every 12 hours  pantoprazole    Tablet 40 milliGRAM(s) Oral <User Schedule>  predniSONE   Tablet 15 milliGRAM(s) Oral daily  primidone 50 milliGRAM(s) Oral at bedtime  simvastatin 20 milliGRAM(s) Oral at bedtime  thiamine 100 milliGRAM(s) Oral daily    MEDICATIONS  (PRN):  acetaminophen   Tablet .. 975 milliGRAM(s) Oral every 6 hours PRN Moderate Pain (4 - 6), Severe Pain (7 - 10)  acetaminophen  Suppository .. 650 milliGRAM(s) Rectal every 6 hours PRN Temp greater or equal to 38C (100.4F)  albuterol/ipratropium for Nebulization 3 milliLiter(s) Nebulizer every 6 hours PRN Shortness of Breath and/or Wheezing  dextrose 40% Gel 15 Gram(s) Oral once PRN Blood Glucose LESS THAN 70 milliGRAM(s)/deciLiter  glucagon  Injectable 1 milliGRAM(s) IntraMuscular once PRN Glucose <70 milliGRAM(s)/deciLiter  melatonin 3 milliGRAM(s) Oral at bedtime PRN Insomnia      CAPILLARY BLOOD GLUCOSE      POCT Blood Glucose.: 171 mg/dL (04 Mar 2020 05:36)  POCT Blood Glucose.: 137 mg/dL (03 Mar 2020 21:45)  POCT Blood Glucose.: 169 mg/dL (03 Mar 2020 18:02)  POCT Blood Glucose.: 129 mg/dL (03 Mar 2020 13:06)  POCT Blood Glucose.: 171 mg/dL (03 Mar 2020 09:20)    I&O's Summary    03 Mar 2020 07:01  -  04 Mar 2020 07:00  --------------------------------------------------------  IN: 360 mL / OUT: 0 mL / NET: 360 mL        PHYSICAL EXAM:  Vital Signs Last 24 Hrs  T(C): 36.8 (04 Mar 2020 05:35), Max: 36.8 (03 Mar 2020 16:18)  T(F): 98.3 (04 Mar 2020 05:35), Max: 98.3 (04 Mar 2020 05:35)  HR: 110 (04 Mar 2020 05:35) (102 - 110)  BP: 129/65 (04 Mar 2020 05:35) (125/84 - 144/79)  BP(mean): --  RR: 19 (04 Mar 2020 05:35) (19 - 20)  SpO2: 97% (04 Mar 2020 05:35) (95% - 97%)  CONSTITUTIONAL: unable to do a physical examination.     LABS:                        9.9    10.42 )-----------( 385      ( 03 Mar 2020 10:46 )             33.2     03-03    136  |  102  |  20  ----------------------------<  167<H>  4.3   |  17<L>  |  0.99    Ca    9.1      03 Mar 2020 10:46    TPro  6.9  /  Alb  3.5  /  TBili  0.1<L>  /  DBili  x   /  AST  22  /  ALT  29  /  AlkPhos  90  03-03                RADIOLOGY & ADDITIONAL TESTS:  Results Reviewed:   Imaging Personally Reviewed:  Electrocardiogram Personally Reviewed:    COORDINATION OF CARE:  Care Discussed with Consultants/Other Providers [Y/N]:  Prior or Outpatient Records Reviewed [Y/N]:

## 2020-03-04 NOTE — CHART NOTE - NSCHARTNOTEFT_GEN_A_CORE
Nutrition Follow Up Note    Patient seen for malnutrition follow up.    Source: RN, EMR    Chart reviewed, events noted. Pt since changed to PO diet, tolerating well. Pt previously in catatonic state, has since resolved/improved. Per chart notes pt refusing medication today.    Diet: Soft, Consistent Carbohydrate (no snacks)     Patient with no reported N+V, last BM 3/3.    PO intake: Per chart notes pt has been tolerating PO well since cessation of enteral feedings on .  RN flow sheet documentation suggest pt consuming 75% of meals.     Per endo notes pt going home on basal-bolus insulin. Pt remains inappropriate to provide education to with current mental status.    Source for PO intake: EMR    Enteral/Parenteral Nutrition: none    Weights in k.9 (-) 94.2 (-), 93.3 kg (-), 94.3 (, dosing weight)   most recent weight suggests weight loss since admission - will continue to monitor as additional weights are obtained.     Pertinent Medications: MEDICATIONS  (STANDING):  budesonide  80 MICROgram(s)/formoterol 4.5 MICROgram(s) Inhaler 2 Puff(s) Inhalation two times a day  dextrose 5%. 1000 milliLiter(s) (50 mL/Hr) IV Continuous <Continuous>  dextrose 50% Injectable 12.5 Gram(s) IV Push once  dextrose 50% Injectable 25 Gram(s) IV Push once  dextrose 50% Injectable 25 Gram(s) IV Push once  heparin  Injectable 5000 Unit(s) SubCutaneous every 8 hours  influenza   Vaccine 0.5 milliLiter(s) IntraMuscular once  insulin glargine Injectable (LANTUS) 24 Unit(s) SubCutaneous <User Schedule>  insulin lispro (HumaLOG) corrective regimen sliding scale   SubCutaneous three times a day before meals  insulin lispro (HumaLOG) corrective regimen sliding scale   SubCutaneous at bedtime  insulin lispro Injectable (HumaLOG) 10 Unit(s) SubCutaneous three times a day with meals  levothyroxine 175 MICROGram(s) Oral daily  LORazepam     Tablet 0.5 milliGRAM(s) Oral three times a day  LORazepam   Injectable 0.5 milliGRAM(s) IV Push three times a day  losartan 25 milliGRAM(s) Oral daily  metoprolol tartrate 25 milliGRAM(s) Oral two times a day  multivitamin/minerals/iron Oral Solution (CENTRUM) 15 milliLiter(s) Oral daily  nystatin Powder 1 Application(s) Topical every 12 hours  pantoprazole    Tablet 40 milliGRAM(s) Oral <User Schedule>  predniSONE   Tablet 15 milliGRAM(s) Oral daily  primidone 50 milliGRAM(s) Oral at bedtime  simvastatin 20 milliGRAM(s) Oral at bedtime  thiamine 100 milliGRAM(s) Oral daily    MEDICATIONS  (PRN):  acetaminophen   Tablet .. 975 milliGRAM(s) Oral every 6 hours PRN Moderate Pain (4 - 6), Severe Pain (7 - 10)  acetaminophen  Suppository .. 650 milliGRAM(s) Rectal every 6 hours PRN Temp greater or equal to 38C (100.4F)  albuterol/ipratropium for Nebulization 3 milliLiter(s) Nebulizer every 6 hours PRN Shortness of Breath and/or Wheezing  dextrose 40% Gel 15 Gram(s) Oral once PRN Blood Glucose LESS THAN 70 milliGRAM(s)/deciLiter  glucagon  Injectable 1 milliGRAM(s) IntraMuscular once PRN Glucose <70 milliGRAM(s)/deciLiter  melatonin 3 milliGRAM(s) Oral at bedtime PRN Insomnia    Pertinent Labs: no updated labs today    Finger Sticks:  POCT Blood Glucose.: 161 mg/dL ( @ 08:57)  POCT Blood Glucose.: 171 mg/dL ( @ 05:36)  POCT Blood Glucose.: 137 mg/dL ( @ 21:45)  POCT Blood Glucose.: 169 mg/dL ( @ 18:02)  POCT Blood Glucose.: 129 mg/dL ( @ 13:06)      Skin per nursing documentation: free of pressure injuries  Edema per nursing documentation: 2+ generalized edema    Estimated Needs:   [x] no change since previous assessment    Previous Nutrition Diagnosis: Moderate malnutrition, altered nutrition related lab values  Nutrition Diagnosis is ongoing, improving with increased PO intake since enteral feeds stopped    New Nutrition Diagnosis: none    Recommend  1. Continue with current diet as ordered.   2. Continue to provide PO encouragement and assistance with meals as needed.    Monitoring and Evaluation:     Continue to monitor nutritional intake, tolerance to diet prescription, weights, labs, skin integrity    RD remains available upon request and will follow up per protocol    Nadeen You RD, Pager # [phone] Nutrition Follow Up Note    Patient seen for malnutrition follow up.    Source: RN, EMR    Chart reviewed, events noted. Pt since changed to PO diet, tolerating well. Pt previously in catatonic state, has since resolved/improved. Per chart notes pt refusing medication today.    Diet: Soft, Consistent Carbohydrate (no snacks)     Patient with no reported N+V, last BM 3/3.    PO intake: Per chart notes pt has been tolerating PO well since cessation of enteral feedings on . RN states pt consumed 25% of breakfast this morning but notes other nurses have said the pt is eating well otherwise. RN flow sheet documentation suggest pt consuming 75% of meals.     Per endo notes pt going home on basal-bolus insulin. Pt remains inappropriate to provide education to with current mental status.    Source for PO intake: RN, EMR    Enteral/Parenteral Nutrition: none    Weights in k.9 (-) 94.2 (-), 93.3 kg (-), 94.3 (-, dosing weight)   most recent weight suggests weight loss since admission - will continue to monitor as additional weights are obtained.     Pertinent Medications: MEDICATIONS  (STANDING):  budesonide  80 MICROgram(s)/formoterol 4.5 MICROgram(s) Inhaler 2 Puff(s) Inhalation two times a day  dextrose 5%. 1000 milliLiter(s) (50 mL/Hr) IV Continuous <Continuous>  dextrose 50% Injectable 12.5 Gram(s) IV Push once  dextrose 50% Injectable 25 Gram(s) IV Push once  dextrose 50% Injectable 25 Gram(s) IV Push once  heparin  Injectable 5000 Unit(s) SubCutaneous every 8 hours  influenza   Vaccine 0.5 milliLiter(s) IntraMuscular once  insulin glargine Injectable (LANTUS) 24 Unit(s) SubCutaneous <User Schedule>  insulin lispro (HumaLOG) corrective regimen sliding scale   SubCutaneous three times a day before meals  insulin lispro (HumaLOG) corrective regimen sliding scale   SubCutaneous at bedtime  insulin lispro Injectable (HumaLOG) 10 Unit(s) SubCutaneous three times a day with meals  levothyroxine 175 MICROGram(s) Oral daily  LORazepam     Tablet 0.5 milliGRAM(s) Oral three times a day  LORazepam   Injectable 0.5 milliGRAM(s) IV Push three times a day  losartan 25 milliGRAM(s) Oral daily  metoprolol tartrate 25 milliGRAM(s) Oral two times a day  multivitamin/minerals/iron Oral Solution (CENTRUM) 15 milliLiter(s) Oral daily  nystatin Powder 1 Application(s) Topical every 12 hours  pantoprazole    Tablet 40 milliGRAM(s) Oral <User Schedule>  predniSONE   Tablet 15 milliGRAM(s) Oral daily  primidone 50 milliGRAM(s) Oral at bedtime  simvastatin 20 milliGRAM(s) Oral at bedtime  thiamine 100 milliGRAM(s) Oral daily    MEDICATIONS  (PRN):  acetaminophen   Tablet .. 975 milliGRAM(s) Oral every 6 hours PRN Moderate Pain (4 - 6), Severe Pain (7 - 10)  acetaminophen  Suppository .. 650 milliGRAM(s) Rectal every 6 hours PRN Temp greater or equal to 38C (100.4F)  albuterol/ipratropium for Nebulization 3 milliLiter(s) Nebulizer every 6 hours PRN Shortness of Breath and/or Wheezing  dextrose 40% Gel 15 Gram(s) Oral once PRN Blood Glucose LESS THAN 70 milliGRAM(s)/deciLiter  glucagon  Injectable 1 milliGRAM(s) IntraMuscular once PRN Glucose <70 milliGRAM(s)/deciLiter  melatonin 3 milliGRAM(s) Oral at bedtime PRN Insomnia    Pertinent Labs: no updated labs today    Finger Sticks:  POCT Blood Glucose.: 161 mg/dL ( @ 08:57)  POCT Blood Glucose.: 171 mg/dL ( @ 05:36)  POCT Blood Glucose.: 137 mg/dL ( @ 21:45)  POCT Blood Glucose.: 169 mg/dL ( @ 18:02)  POCT Blood Glucose.: 129 mg/dL ( @ 13:06)      Skin per nursing documentation: free of pressure injuries  Edema per nursing documentation: 2+ generalized edema    Estimated Needs:   [x] no change since previous assessment    Previous Nutrition Diagnosis: Moderate malnutrition, altered nutrition related lab values  Nutrition Diagnosis is ongoing, improving with increased PO intake since enteral feeds stopped    New Nutrition Diagnosis: none    Recommend  1. Continue with current diet as ordered.   2. Continue to provide PO encouragement and assistance with meals as needed.    Monitoring and Evaluation:     Continue to monitor nutritional intake, tolerance to diet prescription, weights, labs, skin integrity    RD remains available upon request and will follow up per protocol    Nadeen You RD, Pager # [phone] Nutrition Follow Up Note    Patient seen for malnutrition follow up.    Source: RN, EMR    Chart reviewed, events noted. Pt since changed to PO diet, tolerating well. Pt previously in catatonic state, has since resolved/improved. Per chart notes pt refusing medication today.    Diet: Soft, Consistent Carbohydrate (no snacks)     Patient with no reported N+V, last BM 3/3. Attempted to speak to pt, pt with eyes closed and does not respond when asked questions.    PO intake: Per chart notes pt has been tolerating PO well since cessation of enteral feedings on . RN states pt consumed 25% of breakfast this morning but notes other nurses have said the pt is eating well otherwise. RN flow sheet documentation suggest pt consuming 75% of meals.     Per endo notes pt going home on basal-bolus insulin. Pt remains inappropriate to provide education to with current mental status.    Source for PO intake: RN, EMR    Enteral/Parenteral Nutrition: none    Weights in k.9 (-) 94.2 (-), 93.3 kg (-), 94.3 (-, dosing weight)   most recent weight suggests weight loss since admission - will continue to monitor as additional weights are obtained.     Pertinent Medications: MEDICATIONS  (STANDING):  budesonide  80 MICROgram(s)/formoterol 4.5 MICROgram(s) Inhaler 2 Puff(s) Inhalation two times a day  dextrose 5%. 1000 milliLiter(s) (50 mL/Hr) IV Continuous <Continuous>  dextrose 50% Injectable 12.5 Gram(s) IV Push once  dextrose 50% Injectable 25 Gram(s) IV Push once  dextrose 50% Injectable 25 Gram(s) IV Push once  heparin  Injectable 5000 Unit(s) SubCutaneous every 8 hours  influenza   Vaccine 0.5 milliLiter(s) IntraMuscular once  insulin glargine Injectable (LANTUS) 24 Unit(s) SubCutaneous <User Schedule>  insulin lispro (HumaLOG) corrective regimen sliding scale   SubCutaneous three times a day before meals  insulin lispro (HumaLOG) corrective regimen sliding scale   SubCutaneous at bedtime  insulin lispro Injectable (HumaLOG) 10 Unit(s) SubCutaneous three times a day with meals  levothyroxine 175 MICROGram(s) Oral daily  LORazepam     Tablet 0.5 milliGRAM(s) Oral three times a day  LORazepam   Injectable 0.5 milliGRAM(s) IV Push three times a day  losartan 25 milliGRAM(s) Oral daily  metoprolol tartrate 25 milliGRAM(s) Oral two times a day  multivitamin/minerals/iron Oral Solution (CENTRUM) 15 milliLiter(s) Oral daily  nystatin Powder 1 Application(s) Topical every 12 hours  pantoprazole    Tablet 40 milliGRAM(s) Oral <User Schedule>  predniSONE   Tablet 15 milliGRAM(s) Oral daily  primidone 50 milliGRAM(s) Oral at bedtime  simvastatin 20 milliGRAM(s) Oral at bedtime  thiamine 100 milliGRAM(s) Oral daily    MEDICATIONS  (PRN):  acetaminophen   Tablet .. 975 milliGRAM(s) Oral every 6 hours PRN Moderate Pain (4 - 6), Severe Pain (7 - 10)  acetaminophen  Suppository .. 650 milliGRAM(s) Rectal every 6 hours PRN Temp greater or equal to 38C (100.4F)  albuterol/ipratropium for Nebulization 3 milliLiter(s) Nebulizer every 6 hours PRN Shortness of Breath and/or Wheezing  dextrose 40% Gel 15 Gram(s) Oral once PRN Blood Glucose LESS THAN 70 milliGRAM(s)/deciLiter  glucagon  Injectable 1 milliGRAM(s) IntraMuscular once PRN Glucose <70 milliGRAM(s)/deciLiter  melatonin 3 milliGRAM(s) Oral at bedtime PRN Insomnia    Pertinent Labs: no updated labs today    Finger Sticks:  POCT Blood Glucose.: 161 mg/dL ( @ 08:57)  POCT Blood Glucose.: 171 mg/dL ( @ 05:36)  POCT Blood Glucose.: 137 mg/dL ( @ 21:45)  POCT Blood Glucose.: 169 mg/dL ( @ 18:02)  POCT Blood Glucose.: 129 mg/dL ( @ 13:06)      Skin per nursing documentation: free of pressure injuries  Edema per nursing documentation: 2+ generalized edema    Estimated Needs:   [x] no change since previous assessment    Previous Nutrition Diagnosis: Moderate malnutrition, altered nutrition related lab values  Nutrition Diagnosis is ongoing, improving with increased PO intake since enteral feeds stopped    New Nutrition Diagnosis: none    Recommend  1. Continue with current diet as ordered.   2. Continue to provide PO encouragement and assistance with meals as needed.    Monitoring and Evaluation:     Continue to monitor nutritional intake, tolerance to diet prescription, weights, labs, skin integrity    RD remains available upon request and will follow up per protocol    Nadeen You RD, Pager # [phone]

## 2020-03-04 NOTE — PROGRESS NOTE ADULT - PROBLEM SELECTOR PLAN 2
-Pt currently on PO Synthroid taking it with other meds due to pt issues taking meds on time and refusing meds on and off.   -C/w multivitamins and Protonix administratio at 12 noon instead of am  -C/w Synthroid 175 mcg PO daily.   -Make sure med is given in an empty stomach  -Re test TFT in 4-6 weeks. Will be difficult to improve levels if pt doesn't take med consistently.  -Plan discussed with pt/team. Can consider iv administration but this is not doable upon discharge  Contact info: [phone] (24/7). pager [phone] -Pt currently on PO Synthroid taking it with other meds due to pt issues taking meds on time and refusing meds on and off.   -C/w multivitamins and Protonix administratio at 12 noon instead of am  -C/w Synthroid 175 mcg PO daily.   -Make sure med is given in an empty stomach  -Re test TFT in 4-6 weeks. Will be difficult to improve levels if pt doesn't take med consistently.  -Plan discussed with pt's team. Can consider iv administration but this is not doable upon discharge  Contact info: [phone] (24/7). pager [phone]

## 2020-03-04 NOTE — PROGRESS NOTE ADULT - PROBLEM SELECTOR PLAN 1
Likely underlying rapidly progressive dementia, hospital delirium.   -period of catatonic state which improved with ativan  -per neuro and psych, c/w Ativan 0.5mg TID, son agreeable. Will give 1mg IV ativan for agitation.   -psych considering starting new antipsychotic medication - will follow up recommendations.   -Hypothyroidism possible etiology; , free T4 0.3. Endocrine following. Now on PO 175mcg synthroid qd. However no mental status improvement with synthroid.   -MRI brain 3mm right sided SDH no neurosurgical intervention   -LP 2/6 CSF studies wnl.  -repeat PT evaluation; pt refusing

## 2020-03-04 NOTE — PROGRESS NOTE BEHAVIORAL HEALTH - NSBHCONSULTRECOMMENDOTHER_PSY_A_CORE FT
1) In recent days, catatonia treated, on ativan 0.5 mg TID. pt has periods of irritable mood, requires re-direction, but overall presentation improved since her admission to the hospital. 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 Consider changing ativan IV to PO 0.5 mg TID for anxiety/irritable mood. If pt refuses PO, continue to observe for changes in mood    2) it should be noted, that due to 1) In recent days, catatonia treated, on ativan 0.5 mg TID. pt has periods of irritable mood, requires re-direction, but overall presentation improved since her admission to the hospital. 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 Consider changing ativan IV to PO 0.5 mg TID for anxiety/irritable mood. If pt refuses PO, continue to observe for changes in mood    2) it should be noted, that due to pt's vascular lesions (hx of subacute stroke/SDH), pt may be at a new cognitive baseline, rule out dementia process. her mood lability symptoms have improved since her admission.     3) family has been hesitant regarding giving anti-psychotic medications for mood stability. as previously discussed with Heavenly, pt's daughter, if pt has never been on abilify, may consider 2.5 mg daily for mood stability, but please obtain consent from pt's HCP. it appears she has two health care proxies, Heavenly and Norris. f/u QTc < 500    4) no indication for inpt psychiatric admission

## 2020-03-04 NOTE — PROGRESS NOTE ADULT - ATTENDING COMMENTS
Seen at bedside, son present. Son continues to report that the patient does not remain at her baseline mental status although she is improved from admission. She continues to intermittently refuse oral medications. Son continues to perseverate on the fact that his mother could still have a urinary tract infection even though she had completed a course of therapy and has remained afebrile without leukocytosis. Pt is without symptoms. No cough, no urinary frequency or dysuria. Given son's concern we will check a Ua and bcx if patient allows the studies to be collected. Psych following as the patient needs a oral mood stabilization option, however options are limited given adverse reactions to mood stabilizers and antipsychotics in the past. Psych was reached out to by us as well as Norris, the patients son, and given potential medication options in the afternoon, however he requested to speak to psych directly. Ultimately, the patient will likely require a family meeting with psych present to discuss goals of treatement and discharge plan moving forward

## 2020-03-04 NOTE — PROGRESS NOTE ADULT - PROBLEM SELECTOR PLAN 1
-test BG AC/HS  -C/w Lantus 24 units QAM.  -C/w Humalog ac meals 10 units for now. HOLD IF NOT EATING  -C/w Humalog moderate correction scale AC and low HS scale  Discharge plan: basal/bolus  -Even though pt is alert and talking, she still unable to care for her DM. Per primary team plan to discharge to rehab when more stable  -f/u outpt w/Dr Saul. Will make apt closer to discharge    --Plan discussed with pt's team/son and RN.   Contact info: [phone] (24/7). pager [phone]

## 2020-03-05 LAB
GLUCOSE BLDC GLUCOMTR-MCNC: 142 MG/DL — HIGH (ref 70–99)
GLUCOSE BLDC GLUCOMTR-MCNC: 158 MG/DL — HIGH (ref 70–99)
GLUCOSE BLDC GLUCOMTR-MCNC: 172 MG/DL — HIGH (ref 70–99)
GLUCOSE BLDC GLUCOMTR-MCNC: 185 MG/DL — HIGH (ref 70–99)
GLUCOSE BLDC GLUCOMTR-MCNC: 190 MG/DL — HIGH (ref 70–99)

## 2020-03-05 PROCEDURE — 99232 SBSQ HOSP IP/OBS MODERATE 35: CPT

## 2020-03-05 PROCEDURE — 99232 SBSQ HOSP IP/OBS MODERATE 35: CPT | Mod: GC

## 2020-03-05 RX ADMIN — Medication 25 MILLIGRAM(S): at 06:11

## 2020-03-05 RX ADMIN — Medication 10 UNIT(S): at 10:22

## 2020-03-05 RX ADMIN — Medication 0.5 MILLIGRAM(S): at 22:14

## 2020-03-05 RX ADMIN — SIMVASTATIN 20 MILLIGRAM(S): 20 TABLET, FILM COATED ORAL at 22:14

## 2020-03-05 RX ADMIN — HEPARIN SODIUM 5000 UNIT(S): 5000 INJECTION INTRAVENOUS; SUBCUTANEOUS at 10:23

## 2020-03-05 RX ADMIN — LOSARTAN POTASSIUM 25 MILLIGRAM(S): 100 TABLET, FILM COATED ORAL at 05:46

## 2020-03-05 RX ADMIN — PRIMIDONE 50 MILLIGRAM(S): 250 TABLET ORAL at 22:14

## 2020-03-05 RX ADMIN — Medication 10 UNIT(S): at 17:54

## 2020-03-05 RX ADMIN — Medication 2: at 10:23

## 2020-03-05 RX ADMIN — INSULIN GLARGINE 24 UNIT(S): 100 INJECTION, SOLUTION SUBCUTANEOUS at 05:56

## 2020-03-05 RX ADMIN — HEPARIN SODIUM 5000 UNIT(S): 5000 INJECTION INTRAVENOUS; SUBCUTANEOUS at 22:14

## 2020-03-05 RX ADMIN — HEPARIN SODIUM 5000 UNIT(S): 5000 INJECTION INTRAVENOUS; SUBCUTANEOUS at 13:37

## 2020-03-05 RX ADMIN — Medication 175 MICROGRAM(S): at 05:46

## 2020-03-05 RX ADMIN — NYSTATIN CREAM 1 APPLICATION(S): 100000 CREAM TOPICAL at 17:55

## 2020-03-05 RX ADMIN — NYSTATIN CREAM 1 APPLICATION(S): 100000 CREAM TOPICAL at 06:12

## 2020-03-05 RX ADMIN — HEPARIN SODIUM 5000 UNIT(S): 5000 INJECTION INTRAVENOUS; SUBCUTANEOUS at 02:00

## 2020-03-05 RX ADMIN — Medication 0.5 MILLIGRAM(S): at 05:45

## 2020-03-05 RX ADMIN — Medication 15 MILLIGRAM(S): at 05:45

## 2020-03-05 RX ADMIN — Medication 2: at 17:54

## 2020-03-05 NOTE — PROGRESS NOTE ADULT - SUBJECTIVE AND OBJECTIVE BOX
Chief Complaint: Uncontrolled DM2, hypothyroidism    S: No ON events. Pt. tolerating PO but is refusing alot of meals.    MEDICATIONS  (STANDING):  budesonide  80 MICROgram(s)/formoterol 4.5 MICROgram(s) Inhaler 2 Puff(s) Inhalation two times a day  dextrose 5%. 1000 milliLiter(s) (50 mL/Hr) IV Continuous <Continuous>  dextrose 50% Injectable 12.5 Gram(s) IV Push once  dextrose 50% Injectable 25 Gram(s) IV Push once  dextrose 50% Injectable 25 Gram(s) IV Push once  heparin  Injectable 5000 Unit(s) SubCutaneous every 8 hours  influenza   Vaccine 0.5 milliLiter(s) IntraMuscular once  insulin glargine Injectable (LANTUS) 24 Unit(s) SubCutaneous <User Schedule>  insulin lispro (HumaLOG) corrective regimen sliding scale   SubCutaneous three times a day before meals  insulin lispro (HumaLOG) corrective regimen sliding scale   SubCutaneous at bedtime  insulin lispro Injectable (HumaLOG) 10 Unit(s) SubCutaneous three times a day with meals  levothyroxine 175 MICROGram(s) Oral daily  LORazepam     Tablet 0.5 milliGRAM(s) Oral three times a day  losartan 25 milliGRAM(s) Oral daily  metoprolol tartrate 25 milliGRAM(s) Oral two times a day  multivitamin/minerals/iron Oral Solution (CENTRUM) 15 milliLiter(s) Oral daily  nystatin Powder 1 Application(s) Topical every 12 hours  pantoprazole    Tablet 40 milliGRAM(s) Oral <User Schedule>  predniSONE   Tablet 15 milliGRAM(s) Oral daily  primidone 50 milliGRAM(s) Oral at bedtime  simvastatin 20 milliGRAM(s) Oral at bedtime  thiamine 100 milliGRAM(s) Oral daily    MEDICATIONS  (PRN):  acetaminophen   Tablet .. 975 milliGRAM(s) Oral every 6 hours PRN Moderate Pain (4 - 6), Severe Pain (7 - 10)  acetaminophen  Suppository .. 650 milliGRAM(s) Rectal every 6 hours PRN Temp greater or equal to 38C (100.4F)  albuterol/ipratropium for Nebulization 3 milliLiter(s) Nebulizer every 6 hours PRN Shortness of Breath and/or Wheezing  dextrose 40% Gel 15 Gram(s) Oral once PRN Blood Glucose LESS THAN 70 milliGRAM(s)/deciLiter  glucagon  Injectable 1 milliGRAM(s) IntraMuscular once PRN Glucose <70 milliGRAM(s)/deciLiter  melatonin 3 milliGRAM(s) Oral at bedtime PRN Insomnia      Allergies    Depakote (Other)  Seroquel (Other (Severe))      Review of Systems:  UNABLE TO OBTAIN    PHYSICAL EXAM:  VITALS: T(C): 37.9 (03-05-20 @ 05:02)  T(F): 100.2 (03-05-20 @ 05:02), Max: 100.2 (03-05-20 @ 05:02)  HR: 110 (03-05-20 @ 05:02) (101 - 112)  BP: 132/82 (03-05-20 @ 05:02) (119/63 - 132/82)  RR:  (18 - 20)  SpO2:  (95% - 98%)  Wt(kg): --  GENERAL: Sleeping comfortably in NAD, well-groomed, well-developed  EYES: No proptosis,  anicteric  HEENT:  Atraumatic, Normocephalic, moist mucous membranes  SKIN: Dry, intact  PSYCH: Unable to asses orientation today as pt. not responding to me      POCT Blood Glucose.: 172 mg/dL (03-05-20 @ 10:12)  POCT Blood Glucose.: 185 mg/dL (03-05-20 @ 05:53)  POCT Blood Glucose.: 149 mg/dL (03-04-20 @ 21:49)  POCT Blood Glucose.: 153 mg/dL (03-04-20 @ 17:55)  POCT Blood Glucose.: 141 mg/dL (03-04-20 @ 15:24)  POCT Blood Glucose.: 161 mg/dL (03-04-20 @ 08:57)  POCT Blood Glucose.: 171 mg/dL (03-04-20 @ 05:36)  POCT Blood Glucose.: 137 mg/dL (03-03-20 @ 21:45)  POCT Blood Glucose.: 169 mg/dL (03-03-20 @ 18:02)  POCT Blood Glucose.: 129 mg/dL (03-03-20 @ 13:06)  POCT Blood Glucose.: 171 mg/dL (03-03-20 @ 09:20)  POCT Blood Glucose.: 179 mg/dL (03-02-20 @ 22:47)  POCT Blood Glucose.: 123 mg/dL (03-02-20 @ 18:30)  POCT Blood Glucose.: 182 mg/dL (03-02-20 @ 13:07)      03-03    136  |  102  |  20  ----------------------------<  167<H>  4.3   |  17<L>  |  0.99    EGFR if : 71  EGFR if non : 61    Ca    9.1      03-03    TPro  6.9  /  Alb  3.5  /  TBili  0.1<L>  /  DBili  x   /  AST  22  /  ALT  29  /  AlkPhos  90  03-03          Thyroid Function Tests:  02-23 @ 13:25 TSH -- FreeT4 -- T3 53 Anti TPO -- Anti Thyroglobulin Ab -- TSI --  02-13 @ 09:05 TSH 17.80 FreeT4 -- T3 -- Anti TPO -- Anti Thyroglobulin Ab -- TSI --      Hemoglobin A1C, Whole Blood: 9.0 % <H> [4.0 - 5.6] (01-28-20 @ 19:30)  Hemoglobin A1C, Whole Blood: 9.2 % <H> [4.0 - 5.6] (01-27-20 @ 17:36)

## 2020-03-05 NOTE — PROGRESS NOTE ADULT - PROBLEM SELECTOR PLAN 2
-Pt currently on PO Synthroid taking it with other meds due to pt issues taking meds on time and refusing some on and off.   -C/w Synthroid 175 mcg PO daily.   -Make sure med is given in an empty stomach  -Re test  TFT in 4-6 weeks.

## 2020-03-05 NOTE — PROGRESS NOTE ADULT - SUBJECTIVE AND OBJECTIVE BOX
Parminder Cavanaugh, pgy 1  [facility]  [phone]  After 7pm; page night float    Patient is a 64y old  Female who presents with a chief complaint of change in mental status (05 Mar 2020 11:35)      SUBJECTIVE / OVERNIGHT EVENTS: Pt agitated this morning  ADDITIONAL REVIEW OF SYSTEMS: Unable to assess.     MEDICATIONS  (STANDING):  budesonide  80 MICROgram(s)/formoterol 4.5 MICROgram(s) Inhaler 2 Puff(s) Inhalation two times a day  dextrose 5%. 1000 milliLiter(s) (50 mL/Hr) IV Continuous <Continuous>  dextrose 50% Injectable 12.5 Gram(s) IV Push once  dextrose 50% Injectable 25 Gram(s) IV Push once  dextrose 50% Injectable 25 Gram(s) IV Push once  heparin  Injectable 5000 Unit(s) SubCutaneous every 8 hours  influenza   Vaccine 0.5 milliLiter(s) IntraMuscular once  insulin glargine Injectable (LANTUS) 24 Unit(s) SubCutaneous <User Schedule>  insulin lispro (HumaLOG) corrective regimen sliding scale   SubCutaneous three times a day before meals  insulin lispro (HumaLOG) corrective regimen sliding scale   SubCutaneous at bedtime  insulin lispro Injectable (HumaLOG) 10 Unit(s) SubCutaneous three times a day with meals  levothyroxine 175 MICROGram(s) Oral daily  LORazepam     Tablet 0.5 milliGRAM(s) Oral three times a day  losartan 25 milliGRAM(s) Oral daily  metoprolol tartrate 25 milliGRAM(s) Oral two times a day  multivitamin/minerals/iron Oral Solution (CENTRUM) 15 milliLiter(s) Oral daily  nystatin Powder 1 Application(s) Topical every 12 hours  pantoprazole    Tablet 40 milliGRAM(s) Oral <User Schedule>  predniSONE   Tablet 15 milliGRAM(s) Oral daily  primidone 50 milliGRAM(s) Oral at bedtime  simvastatin 20 milliGRAM(s) Oral at bedtime  thiamine 100 milliGRAM(s) Oral daily    MEDICATIONS  (PRN):  acetaminophen   Tablet .. 975 milliGRAM(s) Oral every 6 hours PRN Moderate Pain (4 - 6), Severe Pain (7 - 10)  acetaminophen  Suppository .. 650 milliGRAM(s) Rectal every 6 hours PRN Temp greater or equal to 38C (100.4F)  albuterol/ipratropium for Nebulization 3 milliLiter(s) Nebulizer every 6 hours PRN Shortness of Breath and/or Wheezing  dextrose 40% Gel 15 Gram(s) Oral once PRN Blood Glucose LESS THAN 70 milliGRAM(s)/deciLiter  glucagon  Injectable 1 milliGRAM(s) IntraMuscular once PRN Glucose <70 milliGRAM(s)/deciLiter  melatonin 3 milliGRAM(s) Oral at bedtime PRN Insomnia      CAPILLARY BLOOD GLUCOSE      POCT Blood Glucose.: 172 mg/dL (05 Mar 2020 10:12)  POCT Blood Glucose.: 185 mg/dL (05 Mar 2020 05:53)  POCT Blood Glucose.: 149 mg/dL (04 Mar 2020 21:49)  POCT Blood Glucose.: 153 mg/dL (04 Mar 2020 17:55)  POCT Blood Glucose.: 141 mg/dL (04 Mar 2020 15:24)    I&O's Summary    04 Mar 2020 07:01  -  05 Mar 2020 07:00  --------------------------------------------------------  IN: 720 mL / OUT: 0 mL / NET: 720 mL        PHYSICAL EXAM:  Vital Signs Last 24 Hrs  T(C): 37.9 (05 Mar 2020 05:02), Max: 37.9 (05 Mar 2020 05:02)  T(F): 100.2 (05 Mar 2020 05:02), Max: 100.2 (05 Mar 2020 05:02)  HR: 110 (05 Mar 2020 05:02) (101 - 112)  BP: 132/82 (05 Mar 2020 05:02) (119/63 - 132/82)  BP(mean): --  RR: 18 (05 Mar 2020 05:02) (18 - 20)  SpO2: 97% (05 Mar 2020 05:02) (95% - 97%)  unable to assess    LABS:                      RADIOLOGY & ADDITIONAL TESTS:  Results Reviewed:   Imaging Personally Reviewed:  Electrocardiogram Personally Reviewed:    COORDINATION OF CARE:  Care Discussed with Consultants/Other Providers [Y/N]:  Prior or Outpatient Records Reviewed [Y/N]:

## 2020-03-05 NOTE — PROGRESS NOTE ADULT - ASSESSMENT
65 y/o F w/h/o uncontrolled T2DM with unknown complications. Also h/o functional paraplegia, hypothyroidism, pulmonary hypertension, COPD, RA on chronic prednisone, SBO s/p ex lap with lysis of adhesions c/b multifocal pneumonia with AMS of unknown etiology. Glycemic control mostly at goal when pt adherent to diet and DM regimen. No hypoglycemia. Tolerating POS but refusing some meals.       Spent a total time of 25 mins assessing pt/labs/meds and chart and setting plan for DM/hypothyroidism care.

## 2020-03-05 NOTE — PROGRESS NOTE ADULT - PROBLEM SELECTOR PLAN 1
Likely underlying rapidly progressive dementia, hospital delirium.   -period of catatonic state which improved with ativan  -per neuro and psych, c/w Ativan 0.5mg TID, son agreeable. Will give 1mg IV ativan for agitation.   -psych considering starting abilify - will follow up with family to confirm they are okay with this.   -Hypothyroidism possible etiology; , free T4 0.3. Endocrine following. Now on PO 175mcg synthroid qd. However no mental status improvement with synthroid.   -MRI brain 3mm right sided SDH no neurosurgical intervention   -LP 2/6 CSF studies wnl.  -repeat PT evaluation; pt refusing

## 2020-03-05 NOTE — PROGRESS NOTE ADULT - ATTENDING COMMENTS
Was agitated this morning, more calm during the day. On Ativan 0.5mg TID. Psych recommending trialing Abilify. Will reach out to family to obtain consent before starting medication. Will not start Abilify until consent obtained. Will ultimately require a family meeting to further direct care. Will need to coordinate with psych as well as Neuro for meeting.

## 2020-03-06 LAB
GLUCOSE BLDC GLUCOMTR-MCNC: 128 MG/DL — HIGH (ref 70–99)
GLUCOSE BLDC GLUCOMTR-MCNC: 151 MG/DL — HIGH (ref 70–99)
GLUCOSE BLDC GLUCOMTR-MCNC: 175 MG/DL — HIGH (ref 70–99)
GLUCOSE BLDC GLUCOMTR-MCNC: 180 MG/DL — HIGH (ref 70–99)
GLUCOSE BLDC GLUCOMTR-MCNC: 181 MG/DL — HIGH (ref 70–99)

## 2020-03-06 PROCEDURE — 99232 SBSQ HOSP IP/OBS MODERATE 35: CPT | Mod: GC

## 2020-03-06 PROCEDURE — 99232 SBSQ HOSP IP/OBS MODERATE 35: CPT

## 2020-03-06 RX ORDER — LEVOTHYROXINE SODIUM 125 MCG
87.5 TABLET ORAL ONCE
Refills: 0 | Status: COMPLETED | OUTPATIENT
Start: 2020-03-06 | End: 2020-03-06

## 2020-03-06 RX ADMIN — NYSTATIN CREAM 1 APPLICATION(S): 100000 CREAM TOPICAL at 17:43

## 2020-03-06 RX ADMIN — Medication 0.5 MILLIGRAM(S): at 21:54

## 2020-03-06 RX ADMIN — Medication 25 MILLIGRAM(S): at 17:43

## 2020-03-06 RX ADMIN — NYSTATIN CREAM 1 APPLICATION(S): 100000 CREAM TOPICAL at 06:08

## 2020-03-06 RX ADMIN — SIMVASTATIN 20 MILLIGRAM(S): 20 TABLET, FILM COATED ORAL at 21:54

## 2020-03-06 RX ADMIN — Medication 2: at 17:42

## 2020-03-06 RX ADMIN — HEPARIN SODIUM 5000 UNIT(S): 5000 INJECTION INTRAVENOUS; SUBCUTANEOUS at 06:00

## 2020-03-06 RX ADMIN — PRIMIDONE 50 MILLIGRAM(S): 250 TABLET ORAL at 21:54

## 2020-03-06 RX ADMIN — Medication 87.5 MICROGRAM(S): at 17:49

## 2020-03-06 RX ADMIN — HEPARIN SODIUM 5000 UNIT(S): 5000 INJECTION INTRAVENOUS; SUBCUTANEOUS at 21:54

## 2020-03-06 RX ADMIN — Medication 10 UNIT(S): at 17:42

## 2020-03-06 RX ADMIN — INSULIN GLARGINE 24 UNIT(S): 100 INJECTION, SOLUTION SUBCUTANEOUS at 06:00

## 2020-03-06 RX ADMIN — HEPARIN SODIUM 5000 UNIT(S): 5000 INJECTION INTRAVENOUS; SUBCUTANEOUS at 13:18

## 2020-03-06 RX ADMIN — Medication 12.5 MILLIGRAM(S): at 17:49

## 2020-03-06 NOTE — PROGRESS NOTE ADULT - ATTENDING COMMENTS
Pt again starting to refuse oral medications. May need to revert back to IV medications if this continues to be the case, specifically with synthroid given TSH of 100 on admission.   Awaiting to hear from family if they consent to starting abilify  Will need family meeting next week with Psych and Neuro to discuss plan moving forward.   Blood cultures from 3/5 with NGTD. Doubt infection is etiology for current mental status as son was concerned

## 2020-03-06 NOTE — PROGRESS NOTE ADULT - PROBLEM SELECTOR PLAN 1
PDMP reviewed; no aberrant behavior identified, prescription authorized. Thanks. Please advise Mom that the medication was increased to Adderall XR 15 mg, and she should let us know how is he doing in about 1-2 weeks. Thanks    Likely underlying rapidly progressive dementia, hospital delirium.   -period of catatonic state which improved with ativan  -per neuro and psych, c/w Ativan 0.5mg TID, son agreeable. Will give 1mg IV ativan for agitation.   -psych considering starting abilify - will follow up with family to confirm they are okay with this.   -Hypothyroidism possible etiology; , free T4 0.3. Endocrine following. Now on PO 175mcg synthroid qd. However no mental status improvement with synthroid.   -MRI brain 3mm right sided SDH no neurosurgical intervention   -LP 2/6 CSF studies wnl.  -repeat PT evaluation; pt refusing Likely underlying rapidly progressive dementia, hospital delirium.   -period of catatonic state which improved with ativan, continues on ativan  -per neuro and psych, c/w Ativan 0.5mg TID, son agreeable.   -psych considering starting abilify - will follow up with family to confirm they are okay with this.  -Will reach out to psych about setting family meeting about acceptable medications and disposition   -Hypothyroidism possible etiology; , free T4 0.3. Endocrine following. Now on PO 175mcg synthroid qd. Patient continues to refuse medications.   -MRI brain 3mm right sided SDH no neurosurgical intervention   -LP 2/6 CSF studies wnl.  -repeat PT evaluation; pt refusing

## 2020-03-06 NOTE — PROVIDER CONTACT NOTE (MEDICATION) - SITUATION
attempted to give pt morning medication and pt refused to open her mouth  blake fu and explanations given to pt   family member at the bedside also who also tried to give medication to pt but pt cont to refuse

## 2020-03-06 NOTE — PROVIDER CONTACT NOTE (MEDICATION) - BACKGROUND
Dx=AMS,hx of DM, pulm HTN, RA, COPD
altered MS
altered MS, DM2, copd, rheumatoid arthritis.
altered mental changes
change of mental status
pt adx for AMS 2/2 non-compliance of medication adherence.

## 2020-03-06 NOTE — PROVIDER CONTACT NOTE (MEDICATION) - REASON
Pt. refusing morning medications, son called an hour ago stated that if the pt refuses morning medications to push the meds a couple of hours until he gets here to give them to the pt.

## 2020-03-06 NOTE — PROGRESS NOTE ADULT - PROBLEM SELECTOR PLAN 2
-Pt currently on PO Synthroid without consistent adherence   -C/w multivitamins and Protonix administratio at 12 noon instead of am  -C/w Synthroid 175 mcg PO daily.   -Make sure med is given in an empty stomach  -Re test TFT in 4-6 weeks. Will be difficult to improve levels if pt doesn't take med consistently.  -Plan discussed with pt's team. Can consider iv administration but this is not doable long term and upon discharge  Contact info: [phone] (24/7). pager [phone] -Pt currently on PO Synthroid without consistent adherence   -C/w multivitamins and Protonix administratio at 12 noon instead of am  -C/w Synthroid 175 mcg PO daily. Since pt not taking med can restart Synthroid 87.5 mcg IVP daily  -Make sure med is given in an empty stomach  -Re test TFT in 4-6 weeks. Will be difficult to improve levels if pt doesn't take med consistently.  -Plan discussed with pt's team. Can consider iv administration but this is not doable long term and upon discharge  Contact info: [phone] (24/7). pager [phone]

## 2020-03-06 NOTE — PROGRESS NOTE ADULT - PROBLEM SELECTOR PLAN 5
-soft diet per speech, tolerating well  -lantus 24U qdaily  -humalog 12U pre-meal  -c/w moderate ISS, endocrine adjusting  -pt refuses insulin often. -soft diet per speech, tolerating well  -lantus 24U qdaily  -humalog 10U pre-meal  -c/w moderate ISS, endocrine adjusting  -pt refuses insulin often.

## 2020-03-06 NOTE — PROGRESS NOTE ADULT - PROBLEM SELECTOR PLAN 1
-test BG AC/HS  -C/w Lantus 24 units QAM.  -C/w Humalog ac meals 10 units for now. HOLD IF NOT EATING  -C/w Humalog moderate correction scale AC and low HS scale  Discharge plan: basal/bolus  -Even though pt is alert and talking, she still unable to care for her DM. Per primary team plan to discharge to rehab when more stable  -f/u outpt w/Dr Saul. Will make apt closer to discharge    --Plan discussed with pt's team and RN.   Contact info: [phone] (24/7). pager [phone]

## 2020-03-06 NOTE — PROGRESS NOTE ADULT - ASSESSMENT
65 y/o F w/h/o uncontrolled T2DM with unknown complications. Also h/o functional paraplegia, hypothyroidism, pulmonary hypertension, COPD, RA on chronic prednisone, SBO s/p ex lap with lysis of adhesions c/b multifocal pneumonia with AMS of unknown etiology. Pt was on catatonic state that resolved with ativan administration and is now more alert but still confused and refusing meds/treatments on and off including today. Took Lantus but not Humalog or Synthroid/Prednisone this am. Tolerating POs without hypoglycemia.     Spoke to team to consider IV administration of Synthroid and Steroid since this medications should be taken consistently. However, this is a temporary fix due to the fact that upon discharge pt will need to take this meds via PO.     Spent a total time of >15 mins assessing pt/labs/meds and chart and setting plan for DM/hypothyroidism care with son, team and RN. 63 y/o F w/h/o uncontrolled T2DM with unknown complications. Also h/o functional paraplegia, hypothyroidism, pulmonary hypertension, COPD, RA on chronic prednisone, SBO s/p ex lap with lysis of adhesions c/b multifocal pneumonia with AMS of unknown etiology. Pt was on catatonic state that resolved with ativan administration and is now more alert but still confused and refusing meds/treatments on and off including today. Took Lantus but not Humalog or Synthroid/Prednisone this am. Tolerating POs without hypoglycemia.     Spoke to team to consider IV administration of Synthroid 87.5mcg daily  and Steroid Methylprednisolone 12.5mg daily since these medications should be taken consistently. However, this is a temporary fix due to the fact that upon discharge pt will need to take this meds via PO.     Spent a total time of >25 mins assessing pt/labs/meds and chart and setting plan for DM/hypothyroidism care with team and RN.

## 2020-03-06 NOTE — PROGRESS NOTE ADULT - SUBJECTIVE AND OBJECTIVE BOX
DIABETES FOLLOW UP NOTE: Saw pt earlier today  INTERVAL HX: 63 y/o F w/h/o uncontrolled T2DM with unknown complications. Also h/o functional paraplegia, hypothyroidism, pulmonary hypertension, COPD, RA on chronic prednisone, SBO s/p ex lap with lysis of adhesions c/b multifocal pneumonia with AMS of unknown etiology. Pt was on catatonic state that resolved with ativan administration and is now more alert but still confused and refusing meds/treatments on and off. Per RN pt refused all meds including Prednisone and Synthroid this am and also refused to eat. control at goal while on present insulin doses. No hypoglycemia. Pt alert but refusing to talk at time of visit. Per RN pt is not talking to anyone today but when cousin came to see her this morning relative played [employer] music and pt was singing alone.      Review of Systems:  Non verbal today    Allergies    Depakote (Other)  Seroquel (Other (Severe))    Intolerances      MEDICATIONS:  insulin glargine Injectable (LANTUS) 24 Unit(s) SubCutaneous <User Schedule>  insulin lispro (HumaLOG) corrective regimen sliding scale   SubCutaneous three times a day before meals  insulin lispro (HumaLOG) corrective regimen sliding scale   SubCutaneous at bedtime  insulin lispro Injectable (HumaLOG) 10 Unit(s) SubCutaneous three times a day with meals  levothyroxine 175 MICROGram(s) Oral daily  predniSONE   Tablet 15 milliGRAM(s) Oral daily  simvastatin 20 milliGRAM(s) Oral at bedtime        PHYSICAL EXAM:  VITALS: T(C): 36.6 (03-06-20 @ 13:54)  T(F): 97.9 (03-06-20 @ 13:54), Max: 99 (03-05-20 @ 22:41)  HR: 119 (03-06-20 @ 13:54) (108 - 119)  BP: 133/79 (03-06-20 @ 13:54) (107/71 - 133/79)  RR:  (18 - 20)  SpO2:  (93% - 98%)  Wt(kg): --  GENERAL:  Female laying in bed in NAD  Abdomen: Soft, nontender, non distended. obese with dressing D&I  Extremities: Warm, no edema in all 4 exts  NEURO: Alert, closes eyes when talk to    LABS:  POCT Blood Glucose.: 128 mg/dL (03-06-20 @ 13:02)  POCT Blood Glucose.: 151 mg/dL (03-06-20 @ 08:33)  POCT Blood Glucose.: 175 mg/dL (03-06-20 @ 05:56)  POCT Blood Glucose.: 142 mg/dL (03-05-20 @ 22:16)  POCT Blood Glucose.: 158 mg/dL (03-05-20 @ 17:44)  POCT Blood Glucose.: 190 mg/dL (03-05-20 @ 13:31)  POCT Blood Glucose.: 172 mg/dL (03-05-20 @ 10:12)  POCT Blood Glucose.: 185 mg/dL (03-05-20 @ 05:53)  POCT Blood Glucose.: 149 mg/dL (03-04-20 @ 21:49)  POCT Blood Glucose.: 153 mg/dL (03-04-20 @ 17:55)  POCT Blood Glucose.: 141 mg/dL (03-04-20 @ 15:24)  POCT Blood Glucose.: 161 mg/dL (03-04-20 @ 08:57)  POCT Blood Glucose.: 171 mg/dL (03-04-20 @ 05:36)  POCT Blood Glucose.: 137 mg/dL (03-03-20 @ 21:45)  POCT Blood Glucose.: 169 mg/dL (03-03-20 @ 18:02)        Thyroid Function Tests:  02-23 @ 13:25 TSH -- FreeT4 -- T3 53 Anti TPO -- Anti Thyroglobulin Ab -- TSI --  02-13 @ 09:05 TSH 17.80 FreeT4 -- T3 -- Anti TPO -- Anti Thyroglobulin Ab -- TSI --      Hemoglobin A1C, Whole Blood: 9.0 % <H> [4.0 - 5.6] (01-28-20 @ 19:30)  Hemoglobin A1C, Whole Blood: 9.2 % <H> [4.0 - 5.6] (01-27-20 @ 17:36)

## 2020-03-06 NOTE — PROVIDER CONTACT NOTE (MEDICATION) - SITUATION
Pt. refusing morning medications, son called an hour ago stated that if the pt refuses morning medications to push the meds a couple of hours until he gets here to give them to the pt

## 2020-03-07 LAB
ALBUMIN SERPL ELPH-MCNC: 3.4 G/DL — SIGNIFICANT CHANGE UP (ref 3.3–5)
ALBUMIN SERPL ELPH-MCNC: 4.2 G/DL — SIGNIFICANT CHANGE UP (ref 3.3–5)
ALP SERPL-CCNC: 101 U/L — SIGNIFICANT CHANGE UP (ref 40–120)
ALP SERPL-CCNC: 96 U/L — SIGNIFICANT CHANGE UP (ref 40–120)
ALT FLD-CCNC: 19 U/L — SIGNIFICANT CHANGE UP (ref 10–45)
ALT FLD-CCNC: 19 U/L — SIGNIFICANT CHANGE UP (ref 10–45)
ANION GAP SERPL CALC-SCNC: 20 MMOL/L — HIGH (ref 5–17)
ANION GAP SERPL CALC-SCNC: 22 MMOL/L — HIGH (ref 5–17)
ANION GAP SERPL CALC-SCNC: 22 MMOL/L — HIGH (ref 5–17)
ANION GAP SERPL CALC-SCNC: 23 MMOL/L — HIGH (ref 5–17)
AST SERPL-CCNC: 11 U/L — SIGNIFICANT CHANGE UP (ref 10–40)
AST SERPL-CCNC: 7 U/L — LOW (ref 10–40)
B-OH-BUTYR SERPL-SCNC: 2.4 MMOL/L — HIGH
B-OH-BUTYR SERPL-SCNC: 4.1 MMOL/L — HIGH
BASE EXCESS BLDV CALC-SCNC: -11.5 MMOL/L — LOW (ref -2–2)
BASE EXCESS BLDV CALC-SCNC: -9.1 MMOL/L — LOW (ref -2–2)
BILIRUB SERPL-MCNC: 0.1 MG/DL — LOW (ref 0.2–1.2)
BILIRUB SERPL-MCNC: 0.2 MG/DL — SIGNIFICANT CHANGE UP (ref 0.2–1.2)
BUN SERPL-MCNC: 10 MG/DL — SIGNIFICANT CHANGE UP (ref 7–23)
BUN SERPL-MCNC: 10 MG/DL — SIGNIFICANT CHANGE UP (ref 7–23)
BUN SERPL-MCNC: 11 MG/DL — SIGNIFICANT CHANGE UP (ref 7–23)
BUN SERPL-MCNC: 11 MG/DL — SIGNIFICANT CHANGE UP (ref 7–23)
CA-I SERPL-SCNC: 1.2 MMOL/L — SIGNIFICANT CHANGE UP (ref 1.12–1.3)
CA-I SERPL-SCNC: 1.23 MMOL/L — SIGNIFICANT CHANGE UP (ref 1.12–1.3)
CALCIUM SERPL-MCNC: 9 MG/DL — SIGNIFICANT CHANGE UP (ref 8.4–10.5)
CALCIUM SERPL-MCNC: 9.2 MG/DL — SIGNIFICANT CHANGE UP (ref 8.4–10.5)
CALCIUM SERPL-MCNC: 9.6 MG/DL — SIGNIFICANT CHANGE UP (ref 8.4–10.5)
CALCIUM SERPL-MCNC: 9.6 MG/DL — SIGNIFICANT CHANGE UP (ref 8.4–10.5)
CHLORIDE BLDV-SCNC: 109 MMOL/L — HIGH (ref 96–108)
CHLORIDE BLDV-SCNC: 110 MMOL/L — HIGH (ref 96–108)
CHLORIDE SERPL-SCNC: 101 MMOL/L — SIGNIFICANT CHANGE UP (ref 96–108)
CHLORIDE SERPL-SCNC: 101 MMOL/L — SIGNIFICANT CHANGE UP (ref 96–108)
CHLORIDE SERPL-SCNC: 99 MMOL/L — SIGNIFICANT CHANGE UP (ref 96–108)
CHLORIDE SERPL-SCNC: 99 MMOL/L — SIGNIFICANT CHANGE UP (ref 96–108)
CO2 BLDV-SCNC: 14 MMOL/L — LOW (ref 22–30)
CO2 BLDV-SCNC: 17 MMOL/L — LOW (ref 22–30)
CO2 SERPL-SCNC: 10 MMOL/L — CRITICAL LOW (ref 22–31)
CO2 SERPL-SCNC: 12 MMOL/L — LOW (ref 22–31)
CO2 SERPL-SCNC: 12 MMOL/L — LOW (ref 22–31)
CO2 SERPL-SCNC: 14 MMOL/L — LOW (ref 22–31)
CREAT SERPL-MCNC: 0.9 MG/DL — SIGNIFICANT CHANGE UP (ref 0.5–1.3)
CREAT SERPL-MCNC: 0.96 MG/DL — SIGNIFICANT CHANGE UP (ref 0.5–1.3)
CREAT SERPL-MCNC: 1.02 MG/DL — SIGNIFICANT CHANGE UP (ref 0.5–1.3)
CREAT SERPL-MCNC: 1.08 MG/DL — SIGNIFICANT CHANGE UP (ref 0.5–1.3)
CULTURE RESULTS: SIGNIFICANT CHANGE UP
GAS PNL BLDV: 134 MMOL/L — LOW (ref 135–145)
GAS PNL BLDV: 137 MMOL/L — SIGNIFICANT CHANGE UP (ref 135–145)
GAS PNL BLDV: SIGNIFICANT CHANGE UP
GLUCOSE BLDC GLUCOMTR-MCNC: 230 MG/DL — HIGH (ref 70–99)
GLUCOSE BLDC GLUCOMTR-MCNC: 280 MG/DL — HIGH (ref 70–99)
GLUCOSE BLDC GLUCOMTR-MCNC: 286 MG/DL — HIGH (ref 70–99)
GLUCOSE BLDC GLUCOMTR-MCNC: 306 MG/DL — HIGH (ref 70–99)
GLUCOSE BLDC GLUCOMTR-MCNC: 310 MG/DL — HIGH (ref 70–99)
GLUCOSE BLDV-MCNC: 250 MG/DL — HIGH (ref 70–99)
GLUCOSE BLDV-MCNC: 300 MG/DL — HIGH (ref 70–99)
GLUCOSE SERPL-MCNC: 261 MG/DL — HIGH (ref 70–99)
GLUCOSE SERPL-MCNC: 298 MG/DL — HIGH (ref 70–99)
GLUCOSE SERPL-MCNC: 312 MG/DL — HIGH (ref 70–99)
GLUCOSE SERPL-MCNC: 314 MG/DL — HIGH (ref 70–99)
HCO3 BLDV-SCNC: 14 MMOL/L — LOW (ref 21–29)
HCO3 BLDV-SCNC: 16 MMOL/L — LOW (ref 21–29)
HCT VFR BLD CALC: 34.2 % — LOW (ref 34.5–45)
HCT VFR BLDA CALC: 32 % — LOW (ref 39–50)
HCT VFR BLDA CALC: 35 % — LOW (ref 39–50)
HGB BLD CALC-MCNC: 10.3 G/DL — LOW (ref 11.5–15.5)
HGB BLD CALC-MCNC: 11.5 G/DL — SIGNIFICANT CHANGE UP (ref 11.5–15.5)
HGB BLD-MCNC: 9.8 G/DL — LOW (ref 11.5–15.5)
HOROWITZ INDEX BLDV+IHG-RTO: 21 — SIGNIFICANT CHANGE UP
LACTATE BLDV-MCNC: 1.9 MMOL/L — SIGNIFICANT CHANGE UP (ref 0.7–2)
LACTATE BLDV-MCNC: 2 MMOL/L — SIGNIFICANT CHANGE UP (ref 0.7–2)
LACTATE BLDV-MCNC: 2.1 MMOL/L — HIGH (ref 0.7–2)
MAGNESIUM SERPL-MCNC: 1.8 MG/DL — SIGNIFICANT CHANGE UP (ref 1.6–2.6)
MAGNESIUM SERPL-MCNC: 1.8 MG/DL — SIGNIFICANT CHANGE UP (ref 1.6–2.6)
MAGNESIUM SERPL-MCNC: 1.9 MG/DL — SIGNIFICANT CHANGE UP (ref 1.6–2.6)
MAGNESIUM SERPL-MCNC: 1.9 MG/DL — SIGNIFICANT CHANGE UP (ref 1.6–2.6)
MCHC RBC-ENTMCNC: 28.7 GM/DL — LOW (ref 32–36)
MCHC RBC-ENTMCNC: 28.9 PG — SIGNIFICANT CHANGE UP (ref 27–34)
MCV RBC AUTO: 100.9 FL — HIGH (ref 80–100)
NRBC # BLD: 0 /100 WBCS — SIGNIFICANT CHANGE UP (ref 0–0)
OTHER CELLS CSF MANUAL: 14 ML/DL — LOW (ref 18–22)
OTHER CELLS CSF MANUAL: 8 ML/DL — LOW (ref 18–22)
PCO2 BLDV: 29 MMHG — LOW (ref 35–50)
PCO2 BLDV: 34 MMHG — LOW (ref 35–50)
PH BLDV: 7.29 — LOW (ref 7.35–7.45)
PH BLDV: 7.3 — LOW (ref 7.35–7.45)
PHOSPHATE SERPL-MCNC: 2 MG/DL — LOW (ref 2.5–4.5)
PHOSPHATE SERPL-MCNC: 2.4 MG/DL — LOW (ref 2.5–4.5)
PHOSPHATE SERPL-MCNC: 2.5 MG/DL — SIGNIFICANT CHANGE UP (ref 2.5–4.5)
PHOSPHATE SERPL-MCNC: 3.4 MG/DL — SIGNIFICANT CHANGE UP (ref 2.5–4.5)
PLATELET # BLD AUTO: 347 K/UL — SIGNIFICANT CHANGE UP (ref 150–400)
PO2 BLDV: 32 MMHG — SIGNIFICANT CHANGE UP (ref 25–45)
PO2 BLDV: 62 MMHG — HIGH (ref 25–45)
POTASSIUM BLDV-SCNC: 4.7 MMOL/L — SIGNIFICANT CHANGE UP (ref 3.5–5.3)
POTASSIUM BLDV-SCNC: 4.8 MMOL/L — SIGNIFICANT CHANGE UP (ref 3.5–5.3)
POTASSIUM SERPL-MCNC: 4.6 MMOL/L — SIGNIFICANT CHANGE UP (ref 3.5–5.3)
POTASSIUM SERPL-MCNC: 4.8 MMOL/L — SIGNIFICANT CHANGE UP (ref 3.5–5.3)
POTASSIUM SERPL-MCNC: 4.9 MMOL/L — SIGNIFICANT CHANGE UP (ref 3.5–5.3)
POTASSIUM SERPL-MCNC: 5.1 MMOL/L — SIGNIFICANT CHANGE UP (ref 3.5–5.3)
POTASSIUM SERPL-SCNC: 4.6 MMOL/L — SIGNIFICANT CHANGE UP (ref 3.5–5.3)
POTASSIUM SERPL-SCNC: 4.8 MMOL/L — SIGNIFICANT CHANGE UP (ref 3.5–5.3)
POTASSIUM SERPL-SCNC: 4.9 MMOL/L — SIGNIFICANT CHANGE UP (ref 3.5–5.3)
POTASSIUM SERPL-SCNC: 5.1 MMOL/L — SIGNIFICANT CHANGE UP (ref 3.5–5.3)
PROT SERPL-MCNC: 7.1 G/DL — SIGNIFICANT CHANGE UP (ref 6–8.3)
PROT SERPL-MCNC: 8.2 G/DL — SIGNIFICANT CHANGE UP (ref 6–8.3)
RBC # BLD: 3.39 M/UL — LOW (ref 3.8–5.2)
RBC # FLD: 14.6 % — HIGH (ref 10.3–14.5)
SAO2 % BLDV: 53 % — LOW (ref 67–88)
SAO2 % BLDV: 91 % — HIGH (ref 67–88)
SODIUM SERPL-SCNC: 133 MMOL/L — LOW (ref 135–145)
SODIUM SERPL-SCNC: 133 MMOL/L — LOW (ref 135–145)
SODIUM SERPL-SCNC: 134 MMOL/L — LOW (ref 135–145)
SODIUM SERPL-SCNC: 135 MMOL/L — SIGNIFICANT CHANGE UP (ref 135–145)
SPECIMEN SOURCE: SIGNIFICANT CHANGE UP
WBC # BLD: 9.87 K/UL — SIGNIFICANT CHANGE UP (ref 3.8–10.5)
WBC # FLD AUTO: 9.87 K/UL — SIGNIFICANT CHANGE UP (ref 3.8–10.5)

## 2020-03-07 PROCEDURE — 99233 SBSQ HOSP IP/OBS HIGH 50: CPT | Mod: GC

## 2020-03-07 PROCEDURE — 99233 SBSQ HOSP IP/OBS HIGH 50: CPT

## 2020-03-07 PROCEDURE — 99221 1ST HOSP IP/OBS SF/LOW 40: CPT | Mod: GC

## 2020-03-07 RX ORDER — SODIUM CHLORIDE 9 MG/ML
1000 INJECTION, SOLUTION INTRAVENOUS
Refills: 0 | Status: DISCONTINUED | OUTPATIENT
Start: 2020-03-07 | End: 2020-03-07

## 2020-03-07 RX ORDER — HEPARIN SODIUM 5000 [USP'U]/ML
5000 INJECTION INTRAVENOUS; SUBCUTANEOUS EVERY 8 HOURS
Refills: 0 | Status: DISCONTINUED | OUTPATIENT
Start: 2020-03-07 | End: 2020-03-27

## 2020-03-07 RX ORDER — INSULIN LISPRO 100/ML
VIAL (ML) SUBCUTANEOUS EVERY 4 HOURS
Refills: 0 | Status: DISCONTINUED | OUTPATIENT
Start: 2020-03-07 | End: 2020-03-08

## 2020-03-07 RX ORDER — SODIUM CHLORIDE 9 MG/ML
1000 INJECTION INTRAMUSCULAR; INTRAVENOUS; SUBCUTANEOUS
Refills: 0 | Status: DISCONTINUED | OUTPATIENT
Start: 2020-03-07 | End: 2020-03-08

## 2020-03-07 RX ORDER — SODIUM CHLORIDE 9 MG/ML
1000 INJECTION INTRAMUSCULAR; INTRAVENOUS; SUBCUTANEOUS ONCE
Refills: 0 | Status: COMPLETED | OUTPATIENT
Start: 2020-03-07 | End: 2020-03-07

## 2020-03-07 RX ORDER — LEVOTHYROXINE SODIUM 125 MCG
87.5 TABLET ORAL ONCE
Refills: 0 | Status: DISCONTINUED | OUTPATIENT
Start: 2020-03-07 | End: 2020-03-09

## 2020-03-07 RX ADMIN — SODIUM CHLORIDE 100 MILLILITER(S): 9 INJECTION INTRAMUSCULAR; INTRAVENOUS; SUBCUTANEOUS at 16:21

## 2020-03-07 RX ADMIN — SODIUM CHLORIDE 1000 MILLILITER(S): 9 INJECTION INTRAMUSCULAR; INTRAVENOUS; SUBCUTANEOUS at 12:12

## 2020-03-07 RX ADMIN — Medication 25 MILLIGRAM(S): at 06:03

## 2020-03-07 RX ADMIN — Medication 8: at 13:12

## 2020-03-07 RX ADMIN — Medication 62.5 MILLIMOLE(S): at 13:12

## 2020-03-07 RX ADMIN — Medication 8: at 06:05

## 2020-03-07 RX ADMIN — LOSARTAN POTASSIUM 25 MILLIGRAM(S): 100 TABLET, FILM COATED ORAL at 06:04

## 2020-03-07 RX ADMIN — NYSTATIN CREAM 1 APPLICATION(S): 100000 CREAM TOPICAL at 06:04

## 2020-03-07 RX ADMIN — NYSTATIN CREAM 1 APPLICATION(S): 100000 CREAM TOPICAL at 17:24

## 2020-03-07 RX ADMIN — Medication 6: at 17:23

## 2020-03-07 RX ADMIN — Medication 25 MILLIGRAM(S): at 17:26

## 2020-03-07 RX ADMIN — INSULIN GLARGINE 24 UNIT(S): 100 INJECTION, SOLUTION SUBCUTANEOUS at 06:04

## 2020-03-07 RX ADMIN — HEPARIN SODIUM 5000 UNIT(S): 5000 INJECTION INTRAVENOUS; SUBCUTANEOUS at 06:03

## 2020-03-07 RX ADMIN — Medication 12.5 MILLIGRAM(S): at 06:05

## 2020-03-07 RX ADMIN — Medication 4: at 21:33

## 2020-03-07 RX ADMIN — Medication 0.5 MILLIGRAM(S): at 06:03

## 2020-03-07 RX ADMIN — HEPARIN SODIUM 5000 UNIT(S): 5000 INJECTION INTRAVENOUS; SUBCUTANEOUS at 13:13

## 2020-03-07 RX ADMIN — HEPARIN SODIUM 5000 UNIT(S): 5000 INJECTION INTRAVENOUS; SUBCUTANEOUS at 21:12

## 2020-03-07 NOTE — PROGRESS NOTE ADULT - SUBJECTIVE AND OBJECTIVE BOX
Chief Complaint: T2DM and hypothyroidism     History: Patient is still paranoid and refuses examination or speaking with me. She does converse with her nurse and appears well.     MEDICATIONS  (STANDING):  budesonide  80 MICROgram(s)/formoterol 4.5 MICROgram(s) Inhaler 2 Puff(s) Inhalation two times a day  dextrose 5%. 1000 milliLiter(s) (50 mL/Hr) IV Continuous <Continuous>  dextrose 50% Injectable 12.5 Gram(s) IV Push once  dextrose 50% Injectable 25 Gram(s) IV Push once  dextrose 50% Injectable 25 Gram(s) IV Push once  heparin  Injectable 5000 Unit(s) SubCutaneous every 8 hours  influenza   Vaccine 0.5 milliLiter(s) IntraMuscular once  insulin glargine Injectable (LANTUS) 24 Unit(s) SubCutaneous <User Schedule>  insulin lispro (HumaLOG) corrective regimen sliding scale   SubCutaneous three times a day before meals  insulin lispro (HumaLOG) corrective regimen sliding scale   SubCutaneous at bedtime  insulin lispro Injectable (HumaLOG) 10 Unit(s) SubCutaneous three times a day with meals  levothyroxine Injectable 87.5 MICROGram(s) IV Push once  LORazepam     Tablet 0.5 milliGRAM(s) Oral three times a day  losartan 25 milliGRAM(s) Oral daily  methylPREDNISolone sodium succinate Injectable 12.5 milliGRAM(s) IV Push daily  metoprolol tartrate 25 milliGRAM(s) Oral two times a day  multivitamin/minerals/iron Oral Solution (CENTRUM) 15 milliLiter(s) Oral daily  nystatin Powder 1 Application(s) Topical every 12 hours  pantoprazole    Tablet 40 milliGRAM(s) Oral <User Schedule>  primidone 50 milliGRAM(s) Oral at bedtime  simvastatin 20 milliGRAM(s) Oral at bedtime  sodium phosphate IVPB 15 milliMole(s) IV Intermittent once  thiamine 100 milliGRAM(s) Oral daily    MEDICATIONS  (PRN):  acetaminophen   Tablet .. 975 milliGRAM(s) Oral every 6 hours PRN Moderate Pain (4 - 6), Severe Pain (7 - 10)  acetaminophen  Suppository .. 650 milliGRAM(s) Rectal every 6 hours PRN Temp greater or equal to 38C (100.4F)  albuterol/ipratropium for Nebulization 3 milliLiter(s) Nebulizer every 6 hours PRN Shortness of Breath and/or Wheezing  dextrose 40% Gel 15 Gram(s) Oral once PRN Blood Glucose LESS THAN 70 milliGRAM(s)/deciLiter  glucagon  Injectable 1 milliGRAM(s) IntraMuscular once PRN Glucose <70 milliGRAM(s)/deciLiter  melatonin 3 milliGRAM(s) Oral at bedtime PRN Insomnia      Allergies    Depakote (Other)  Seroquel (Other (Severe))    Intolerances        ROS: All other systems reviewed and negative    PHYSICAL EXAM:  VITALS: T(C): 36.9 (03-07-20 @ 08:35)  T(F): 98.5 (03-07-20 @ 08:35), Max: 98.5 (03-07-20 @ 08:35)  HR: 98 (03-07-20 @ 08:35) (77 - 119)  BP: 131/78 (03-07-20 @ 08:35) (131/78 - 139/82)  RR:  (18 - 18)  SpO2:  (96% - 98%)  Wt(kg): --  GENERAL: NAD, well-groomed, well-developed  RESPIRATORY: Clear to auscultation bilaterally; No rales, rhonchi, wheezing, or rubs  CARDIOVASCULAR: Regular rate and rhythm; No murmurs  NEURO: AOx3, moves all extremities spontaenuously   PSYCH:  reactive affect, euthymic mood      POCT Blood Glucose.: 280 mg/dL (03-07-20 @ 08:29)  POCT Blood Glucose.: 306 mg/dL (03-07-20 @ 05:22)  POCT Blood Glucose.: 180 mg/dL (03-06-20 @ 22:48)  POCT Blood Glucose.: 181 mg/dL (03-06-20 @ 17:40)  POCT Blood Glucose.: 128 mg/dL (03-06-20 @ 13:02)  POCT Blood Glucose.: 151 mg/dL (03-06-20 @ 08:33)  POCT Blood Glucose.: 175 mg/dL (03-06-20 @ 05:56)  POCT Blood Glucose.: 142 mg/dL (03-05-20 @ 22:16)  POCT Blood Glucose.: 158 mg/dL (03-05-20 @ 17:44)  POCT Blood Glucose.: 190 mg/dL (03-05-20 @ 13:31)  POCT Blood Glucose.: 172 mg/dL (03-05-20 @ 10:12)  POCT Blood Glucose.: 185 mg/dL (03-05-20 @ 05:53)  POCT Blood Glucose.: 149 mg/dL (03-04-20 @ 21:49)  POCT Blood Glucose.: 153 mg/dL (03-04-20 @ 17:55)  POCT Blood Glucose.: 141 mg/dL (03-04-20 @ 15:24)      03-07    134<L>  |  99  |  10  ----------------------------<  298<H>  4.8   |  12<L>  |  0.96    EGFR if : 73  EGFR if non : 63    Ca    9.6      03-07  Mg     1.9     03-07  Phos  2.0     03-07    TPro  8.2  /  Alb  4.2  /  TBili  0.2  /  DBili  x   /  AST  7<L>  /  ALT  19  /  AlkPhos  101  03-07          Thyroid Function Tests:  02-23 @ 13:25 TSH -- FreeT4 -- T3 53 Anti TPO -- Anti Thyroglobulin Ab -- TSI --  02-13 @ 09:05 TSH 17.80 FreeT4 -- T3 -- Anti TPO -- Anti Thyroglobulin Ab -- TSI --      Hemoglobin A1C, Whole Blood: 9.0 % <H> [4.0 - 5.6] (01-28-20 @ 19:30)  Hemoglobin A1C, Whole Blood: 9.2 % <H> [4.0 - 5.6] (01-27-20 @ 17:36)

## 2020-03-07 NOTE — CONSULT NOTE ADULT - PROVIDER SPECIALTY LIST ADULT
Neurology
Ethics
MICU
Neurosurgery
Rheumatology
Wound Care
Infectious Disease
Endocrinology
Palliative Care

## 2020-03-07 NOTE — CONSULT NOTE ADULT - ATTENDING COMMENTS
Attending note:  9:00am 1/26/2020    64Y  female examined with Dr. Camejo at 9:00am on January 26, 2020. History and physical examination was reviewed. Spoke to the nurse and the resident physician and evaluated the labs and medical chart. Patient a history of hypothyroidism with increased TSH and is not given synthroid because she refused. Current examination revealed a combative patient, no language dysfunction. Cranial nerve examination unremarkable. LUE movements are slow, whereas other motor examination is within normal limits. DTR are absent. There is no babinski sign.     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     Plan:  Spoke to the resident to start synthroid, get endocrine evaluation, thyroid antibodies, antimicrosomal antibodies, CRP and monitor her temperature. The resident was instructed to call my resident if there is any change and after the endocrine evaluation.     Will follow.     JENNIFER Lujan MD
64F Hx T2DM, COPD on 3Li HO2, CHRIS on Nocturnal CPAP, RA on Chronic Pred, Chronic Tremors, SBO Surgery, small Rt SDH by MRI in Mild-Mod DKA with minimal AG and Ketone refusing Insulin treatment   - No IV Insulin gtt required and continue IV Hydration   - Psych Consult for decision capacity and SQ Insulin dose per Endocrine Service   - No ICU intervention needed.     Patient seen and examined with ICU Resident/Fellow at bedside and lab data, medical records and radiology reports reviewed. I have read and agreeable with resident's Documentation, Assessment and Management Plans above in general which reflected my opinions from discussion.
Above noted   Abdominal wound is healed with resultant large , non tender , incisional hernia  Is currently on a one to one   Sister present, status discussed
Agree with assessment and plan as above. Patient did not allow me to examine her stating that it was a bother to her to have to go through all this testing. Denies active RA symptoms on history. However, has long standing seropositive, erosive disease which does raise the possibility of immune mediated encephalitis especially if symptoms do not improve with correction of her hypothyroid state. Suggest work up as above. Will continue to follow. Please call [phone] for any questions or concerns.
Discussed with son Norris, who states that depakote a few years ago put her in a "catatonic state." Son feels that depakote and UTI are the root cause of her current issue. Son is clear that he understands palliative care, and that his "mother is not going to rot away to die." Son is clear that he wants her NGT removed and to have a S&S evaluation.     Son states that his mother said "I want to eat" to him clearly.  Son also stated: "Have her come out of the hospital eating, or I'm going to melody the hell out of that hospital." At this stage, we will follow to help support family and team, but son is designated decision-maker, and clearly wants NGT removed and S&S eval. May benefit from patient-family centered care to provide support for family as well, given son's reported concerns with her care over this admission.     ______________  Binh Boss MD   of Geriatric and Palliative Medicine  [facility]     Please page the following number for clinical matters between the hours of 9AM and 5PM   from Monday through Friday : [phone]    After 5PM and on weekends, please page: [phone]. The Geriatric and Palliative Medicine consult service has 24/7 coverage for medical recommendations, including for symptom management needs.

## 2020-03-07 NOTE — CONSULT NOTE ADULT - SUBJECTIVE AND OBJECTIVE BOX
CHIEF COMPLAINT:     HPI:  64f hx functional paraplegia bedbound, hypothyroidism, pulmonary HTN, DM on insulin, COPD/CHRIS on noctournal CPAP and 3L home), RA on chronic prednisone, chronic tremors, SBO s/p ex lap with lysis of adhesions (4 retention sutures) c/b eviscerated 2/2 a coughing episode s/p ex lap (6 retention sutures) c/b multifocal pneumonia, developed hypercapnic respiratory failure requiring intubation 1/6/19, s/p bronch 1/9/19, and extubated on 1/24/19, course further c/b Enterococci bacteremia.    Patient unreliable historian, history obtained from Son Norris(lives w/ patient) and Daughter who claim to be HCP. Patient's behavior was first noted to change 3weeks ago. She was accusatory of family for 'stealing $180k from her closet", repeatedly calling back restaurants of which she ordered from claiming she never received deliver when she did, also called police on multiple occasions claiming son was trying to hurt her as well as accusing him of "stealing her oxygen". Patient has fired multiple HHAs claiming they were not useful for her needs. patient's son has also noticed she would drink about "1-2 gallons" of water a day however did not notice change in urination habits. Also c/o constipation, of which son had given OTC meds to help, patient had 1 "baseball size, hard stool" after straining w/o blood described as brown in color. Recently has been spitting at HHAs and caretaker son, throwing her bedpan filled with urine and feces at them.     In the ER patient agitated requiring zyprexa total 20mg, haloperidol 10, versed 8mg    Patient was taken to [facility] for evaluation; was evaluated by psych and deemed to have capacity to leave AMA. Patient w/ elevated WBC and SAM; also later blood culture x1 bottle significant for gram Positive rods was sent to labatory outside hospital system for speciation. (25 Jan 2020 11:56)    MICU consulted for persistent elevated anion gap. BH-B trending down. Patient AAOx1, and reportedly "refusing insulin," but has no capacity to refuse. Angie sob, cp, abdominal pain. Denies N/V/D.       FAMILY HISTORY:  No pertinent family history in first degree relatives      SOCIAL HISTORY:  Smoking: __ packs x ___ years  EtOH Use:  Marital Status:  Occupation:  Recent Travel:  Country of Birth:  Advance Directives:    Allergies    Depakote (Other)  Seroquel (Other (Severe))    Intolerances    Home Medications:  Advair Diskus 250 mcg-50 mcg inhalation powder: 1 puff(s) inhaled once a day (28 Jan 2020 21:46)  albuterol 90 mcg/inh inhalation aerosol: 2 puff(s) inhaled 4 times a day (28 Jan 2020 21:46)  gabapentin 400 mg oral capsule: 1 cap(s) orally 3 times a day (28 Jan 2020 21:46)  Januvia 50 mg oral tablet: 1 tab(s) orally once a day (28 Jan 2020 21:46)  Lantus Solostar Pen 100 units/mL subcutaneous solution: 20 unit(s) subcutaneous once a day (at bedtime) (28 Jan 2020 21:46)  levothyroxine 137 mcg (0.137 mg) oral tablet: 1 tab(s) orally once a day (28 Jan 2020 21:46)  metoprolol succinate 50 mg oral tablet, extended release: 1 tab(s) orally once a day (28 Jan 2020 21:46)  naproxen 250 mg oral tablet: 1 tab(s) orally 2 times a day, As Needed (28 Jan 2020 21:46)  NovoLOG FlexPen 100 units/mL injectable solution: 2-8 unit(s) injectable 3 times a day (with meals)  (28 Jan 2020 21:46)  pantoprazole 40 mg oral delayed release tablet: 1 tab(s) orally once a day (28 Jan 2020 21:46)  predniSONE: 15 milligram(s) orally once a day (28 Jan 2020 21:46)  primidone 50 mg oral tablet: 1 tab(s) orally once a day (at bedtime) (28 Jan 2020 21:46)  sildenafil 20 mg oral tablet: 1 tab(s) orally 3 times a day (28 Jan 2020 21:46)  simvastatin 20 mg oral tablet: 1 tab(s) orally once a day (at bedtime) (28 Jan 2020 21:46)  traMADol 300 mg/24 hours oral tablet, extended release: 1 tab(s) orally once a day, As Needed (28 Jan 2020 21:46)      REVIEW OF SYSTEMS:    CONSTITUTIONAL: No weakness, fevers or chills  EYES: No visual changes  ENT: No vertigo or throat pain   NECK: No pain or stiffness  RESPIRATORY: No cough, wheezing, hemoptysis; No shortness of breath  CARDIOVASCULAR: No chest pain or palpitations  GASTROINTESTINAL: No abdominal or epigastric pain. No nausea, vomiting, or hematemesis; No diarrhea or constipation. No melena or hematochezia.  GENITOURINARY: No dysuria, frequency or hematuria  NEUROLOGICAL: No numbness or weakness  SKIN: No itching, burning, rashes, or lesions   LYMPHATICS: No swollen lymph nodes.  All other review of systems is negative unless indicated above.    OBJECTIVE:  ICU Vital Signs Last 24 Hrs  T(C): 36.6 (07 Mar 2020 21:30), Max: 36.9 (07 Mar 2020 08:35)  T(F): 97.9 (07 Mar 2020 21:30), Max: 98.5 (07 Mar 2020 08:35)  HR: 101 (07 Mar 2020 21:30) (86 - 108)  BP: 114/62 (07 Mar 2020 21:30) (113/71 - 139/82)  BP(mean): --  ABP: --  ABP(mean): --  RR: 18 (07 Mar 2020 21:30) (18 - 18)  SpO2: 96% (07 Mar 2020 21:30) (96% - 99%)        03-07 @ 06:01  -  03-07 @ 23:02  --------------------------------------------------------  IN: 400 mL / OUT: 0 mL / NET: 400 mL      CAPILLARY BLOOD GLUCOSE      POCT Blood Glucose.: 230 mg/dL (07 Mar 2020 21:22)    T(C): 36.6 (03-07-20 @ 21:30), Max: 36.9 (03-07-20 @ 08:35)  HR: 101 (03-07-20 @ 21:30) (86 - 108)  BP: 114/62 (03-07-20 @ 21:30) (113/71 - 139/82)  RR: 18 (03-07-20 @ 21:30) (18 - 18)  SpO2: 96% (03-07-20 @ 21:30) (96% - 99%)    PHYSICAL EXAM:  GENERAL: NAD, well-developed  HEAD:  Atraumatic, Normocephalic  EYES: EOMI, PERRLA, conjunctiva and sclera clear  NECK: Supple, No JVD  CHEST/LUNG: Clear to auscultation bilaterally; No wheeze  HEART: NL s1s2, regular rate and rhythm; No murmurs, rubs, or gallops  ABDOMEN: Soft, Nontender, Nondistended; Bowel sounds present  EXTREMITIES:  2+ Peripheral Pulses, No clubbing, cyanosis, or edema  PSYCH: AAOx1, believes she's in [place].   NEUROLOGY: non-focal, mild tremulousness  SKIN: No rashes or lesions    HOSPITAL MEDICATIONS:  MEDICATIONS  (STANDING):  budesonide  80 MICROgram(s)/formoterol 4.5 MICROgram(s) Inhaler 2 Puff(s) Inhalation two times a day  dextrose 5%. 1000 milliLiter(s) (50 mL/Hr) IV Continuous <Continuous>  dextrose 50% Injectable 12.5 Gram(s) IV Push once  dextrose 50% Injectable 25 Gram(s) IV Push once  dextrose 50% Injectable 25 Gram(s) IV Push once  heparin  Injectable 5000 Unit(s) SubCutaneous every 8 hours  influenza   Vaccine 0.5 milliLiter(s) IntraMuscular once  insulin glargine Injectable (LANTUS) 24 Unit(s) SubCutaneous <User Schedule>  insulin lispro (HumaLOG) corrective regimen sliding scale   SubCutaneous every 4 hours  insulin lispro Injectable (HumaLOG) 10 Unit(s) SubCutaneous three times a day with meals  levothyroxine Injectable 87.5 MICROGram(s) IV Push once  LORazepam     Tablet 0.5 milliGRAM(s) Oral three times a day  losartan 25 milliGRAM(s) Oral daily  methylPREDNISolone sodium succinate Injectable 12.5 milliGRAM(s) IV Push daily  metoprolol tartrate 25 milliGRAM(s) Oral two times a day  multivitamin/minerals/iron Oral Solution (CENTRUM) 15 milliLiter(s) Oral daily  nystatin Powder 1 Application(s) Topical every 12 hours  pantoprazole    Tablet 40 milliGRAM(s) Oral <User Schedule>  primidone 50 milliGRAM(s) Oral at bedtime  simvastatin 20 milliGRAM(s) Oral at bedtime  sodium chloride 0.9%. 1000 milliLiter(s) (125 mL/Hr) IV Continuous <Continuous>  thiamine 100 milliGRAM(s) Oral daily    MEDICATIONS  (PRN):  acetaminophen   Tablet .. 975 milliGRAM(s) Oral every 6 hours PRN Moderate Pain (4 - 6), Severe Pain (7 - 10)  acetaminophen  Suppository .. 650 milliGRAM(s) Rectal every 6 hours PRN Temp greater or equal to 38C (100.4F)  albuterol/ipratropium for Nebulization 3 milliLiter(s) Nebulizer every 6 hours PRN Shortness of Breath and/or Wheezing  dextrose 40% Gel 15 Gram(s) Oral once PRN Blood Glucose LESS THAN 70 milliGRAM(s)/deciLiter  glucagon  Injectable 1 milliGRAM(s) IntraMuscular once PRN Glucose <70 milliGRAM(s)/deciLiter  melatonin 3 milliGRAM(s) Oral at bedtime PRN Insomnia      LABS:                        9.8    9.87  )-----------( 347      ( 07 Mar 2020 07:10 )             34.2     03-07    135  |  101  |  11  ----------------------------<  261<H>  4.9   |  14<L>  |  1.02    Ca    9.6      07 Mar 2020 21:35  Phos  3.4     03-07  Mg     1.8     03-07    TPro  8.2  /  Alb  4.2  /  TBili  0.2  /  DBili  x   /  AST  7<L>  /  ALT  19  /  AlkPhos  101  03-07          Venous Blood Gas:  03-07 @ 21:41  7.30/34/32/16/53  VBG Lactate: 2.1  Venous Blood Gas:  03-07 @ 17:05  7.29/30/46/14/77  VBG Lactate: 2.1  Venous Blood Gas:  03-07 @ 10:08  7.29/29/62/14/91  VBG Lactate: 2.0      MICROBIOLOGY:     RADIOLOGY:  [x ] Reviewed and interpreted by me    EKG:

## 2020-03-07 NOTE — PROVIDER CONTACT NOTE (MEDICATION) - ASSESSMENT
Pt alert, angry, uncooperative, wants to take meds post 0700.
Pt lethargic, non verbal, not swallowing medications.
no s/s of distress currently noted. /71  RR 18 spo2 98%on room air and temp 98.3
no s/s of distress noted.  /62 temp 97.9 spo2 96% RR 18
Pt arouses to verbal stimuli but does not sustain eye opening, VS stable.
Pt refusing 0600 meds, pt educated on need for ordered medications but continues to refuse.
VSS, responding to gospel music and singing
pt very angry and agitated at staff

## 2020-03-07 NOTE — PROGRESS NOTE BEHAVIORAL HEALTH - NSBHCONSULTRECOMMENDOTHER_PSY_A_CORE FT
1) continue ativan 0.5 mg po TID for anxiety/irritable mood    2) it should be noted, that due to pt's vascular lesions (hx of subacute stroke/SDH), pt may be at a new cognitive baseline, rule out dementia process. her mood lability symptoms have improved since her admission.     3) at this time, no need to implement any mood stabilizing medications as originally recommended. family hesitant to give anti-psychotic medications due to poor response in the past or due to side effects. at this time, pt has periods of irritable mood, refusing care at times, but would benefit from frequent re-direction and encouragement of medication compliance. but at this time, no need for further psychotropics other than ativan as described above    4) no indication for inpt psychiatric admission    5) pt has 24-7 care at home, this would be suitable discharge plan. if family is hesitant about this, LTC options can be discussed, such as nursing home setting

## 2020-03-07 NOTE — PROGRESS NOTE ADULT - PROBLEM SELECTOR PLAN 1
Likely underlying rapidly progressive dementia, hospital delirium.   -period of catatonic state which improved with ativan, continues on ativan  -per neuro and psych, c/w Ativan 0.5mg TID, son agreeable.   -psych considering starting abilify - will follow up with family to confirm they are okay with this.  -Will reach out to psych about setting family meeting about acceptable medications and disposition   -Hypothyroidism possible etiology; , free T4 0.3. Endocrine following. Now on PO 175mcg synthroid qd. Patient continues to refuse medications.   -MRI brain 3mm right sided SDH no neurosurgical intervention   -LP 2/6 CSF studies wnl.  -repeat PT evaluation; pt refusing

## 2020-03-07 NOTE — CONSULT NOTE ADULT - REASON FOR ADMISSION
change in mental status

## 2020-03-07 NOTE — CHART NOTE - NSCHARTNOTEFT_GEN_A_CORE
labs today notable for AG, BHB 4.0, pH on VBG 7.29. Not currently altered, mentating well. AVSS.     Will be treating for DKA    Plan:    1. 1L bolus with KCl right now; after 100cc/hr maintenance  2. NPO  3. Moderate ISS  4. VBG, BHB, BMP q4hr  5. Endocrinology following.     Plan discussed with nursing staff and son, Norris.

## 2020-03-07 NOTE — PROGRESS NOTE ADULT - ASSESSMENT
65 y/o F w/h/o uncontrolled T2DM with unknown complications. Also h/o functional paraplegia, hypothyroidism, pulmonary hypertension, COPD, RA on chronic prednisone, SBO s/p ex lap with lysis of adhesions c/b multifocal pneumonia with AMS of unknown etiology. Pt was on catatonic state that resolved with ativan administration and is now more alert but still confused and refusing meds/treatments on and off including today. Took Lantus but not Humalog or Synthroid/Prednisone this am. Tolerating POs without hypoglycemia.     Spoke to team to consider IV administration of Synthroid 87.5mcg daily  and Steroid Methylprednisolone 12.5mg daily since these medications should be taken consistently. However, this is a temporary fix due to the fact that upon discharge pt will need to take this meds via PO.

## 2020-03-07 NOTE — PROGRESS NOTE ADULT - ATTENDING COMMENTS
#DKA  - Pt with mild DKA on labwork, beta hydroxy 4.1. Unclear inciting etiology as pt has been Lantus. Has been intermittently refusing Humalog as well as had decreased PO intake.   - Will need to trend BMP, VBG Q4, start on IVF and make patient NPO  - Endo following and aware. Recommend, Q4 moderate ISS until AG closes

## 2020-03-07 NOTE — PROGRESS NOTE ADULT - SUBJECTIVE AND OBJECTIVE BOX
Parminder Cavanaugh, pgy 1  [facility]  [phone]  After 7pm; page night float    Patient is a 64y old  Female who presents with a chief complaint of change in mental status (06 Mar 2020 13:58)      SUBJECTIVE / OVERNIGHT EVENTS: No acute events overnight. Pt seen and examined at bedside. Not responding to me. She will stare blankly and then look the other direction. She is, however, communicating with the nursing staff.   ADDITIONAL REVIEW OF SYSTEMS: unable to assess.     MEDICATIONS  (STANDING):  budesonide  80 MICROgram(s)/formoterol 4.5 MICROgram(s) Inhaler 2 Puff(s) Inhalation two times a day  dextrose 5%. 1000 milliLiter(s) (50 mL/Hr) IV Continuous <Continuous>  dextrose 50% Injectable 12.5 Gram(s) IV Push once  dextrose 50% Injectable 25 Gram(s) IV Push once  dextrose 50% Injectable 25 Gram(s) IV Push once  heparin  Injectable 5000 Unit(s) SubCutaneous every 8 hours  influenza   Vaccine 0.5 milliLiter(s) IntraMuscular once  insulin glargine Injectable (LANTUS) 24 Unit(s) SubCutaneous <User Schedule>  insulin lispro (HumaLOG) corrective regimen sliding scale   SubCutaneous three times a day before meals  insulin lispro (HumaLOG) corrective regimen sliding scale   SubCutaneous at bedtime  insulin lispro Injectable (HumaLOG) 10 Unit(s) SubCutaneous three times a day with meals  levothyroxine Injectable 87.5 MICROGram(s) IV Push once  LORazepam     Tablet 0.5 milliGRAM(s) Oral three times a day  losartan 25 milliGRAM(s) Oral daily  methylPREDNISolone sodium succinate Injectable 12.5 milliGRAM(s) IV Push daily  metoprolol tartrate 25 milliGRAM(s) Oral two times a day  multivitamin/minerals/iron Oral Solution (CENTRUM) 15 milliLiter(s) Oral daily  nystatin Powder 1 Application(s) Topical every 12 hours  pantoprazole    Tablet 40 milliGRAM(s) Oral <User Schedule>  primidone 50 milliGRAM(s) Oral at bedtime  simvastatin 20 milliGRAM(s) Oral at bedtime  sodium phosphate IVPB 15 milliMole(s) IV Intermittent once  thiamine 100 milliGRAM(s) Oral daily    MEDICATIONS  (PRN):  acetaminophen   Tablet .. 975 milliGRAM(s) Oral every 6 hours PRN Moderate Pain (4 - 6), Severe Pain (7 - 10)  acetaminophen  Suppository .. 650 milliGRAM(s) Rectal every 6 hours PRN Temp greater or equal to 38C (100.4F)  albuterol/ipratropium for Nebulization 3 milliLiter(s) Nebulizer every 6 hours PRN Shortness of Breath and/or Wheezing  dextrose 40% Gel 15 Gram(s) Oral once PRN Blood Glucose LESS THAN 70 milliGRAM(s)/deciLiter  glucagon  Injectable 1 milliGRAM(s) IntraMuscular once PRN Glucose <70 milliGRAM(s)/deciLiter  melatonin 3 milliGRAM(s) Oral at bedtime PRN Insomnia      CAPILLARY BLOOD GLUCOSE      POCT Blood Glucose.: 280 mg/dL (07 Mar 2020 08:29)  POCT Blood Glucose.: 306 mg/dL (07 Mar 2020 05:22)  POCT Blood Glucose.: 180 mg/dL (06 Mar 2020 22:48)  POCT Blood Glucose.: 181 mg/dL (06 Mar 2020 17:40)  POCT Blood Glucose.: 128 mg/dL (06 Mar 2020 13:02)    I&O's Summary      PHYSICAL EXAM:  Vital Signs Last 24 Hrs  T(C): 36.9 (07 Mar 2020 08:35), Max: 36.9 (06 Mar 2020 22:07)  T(F): 98.5 (07 Mar 2020 08:35), Max: 98.5 (07 Mar 2020 08:35)  HR: 98 (07 Mar 2020 08:35) (77 - 119)  BP: 131/78 (07 Mar 2020 08:35) (131/78 - 139/82)  BP(mean): --  RR: 18 (07 Mar 2020 08:35) (18 - 18)  SpO2: 97% (07 Mar 2020 08:35) (96% - 98%)  CONSTITUTIONAL: NAD, well-developed, well-groomed  EYES: PERRLA; conjunctiva and sclera clear  RESPIRATORY: Normal respiratory effort; lungs are clear to auscultation bilaterally  CARDIOVASCULAR: Regular rate and rhythm, normal S1 and S2, no murmur/rub/gallop; No lower extremity edema; Peripheral pulses are 2+ bilaterally  ABDOMEN: Nontender to palpation, normoactive bowel sounds, no rebound/guarding; No hepatosplenomegaly      LABS:                        9.8    9.87  )-----------( 347      ( 07 Mar 2020 07:10 )             34.2     03-07    133<L>  |  101  |  10  ----------------------------<  312<H>  5.1   |  10<LL>  |  0.90    Ca    9.0      07 Mar 2020 07:07  Phos  2.4     03-07  Mg     1.8     03-07    TPro  7.1  /  Alb  3.4  /  TBili  0.1<L>  /  DBili  x   /  AST  11  /  ALT  19  /  AlkPhos  96  03-07              Culture - Blood (collected 05 Mar 2020 12:46)  Source: .Blood Blood-Peripheral  Preliminary Report (06 Mar 2020 13:02):    No growth to date.    Culture - Blood (collected 05 Mar 2020 12:46)  Source: .Blood Blood  Preliminary Report (06 Mar 2020 13:02):    No growth to date.        RADIOLOGY & ADDITIONAL TESTS:  Results Reviewed:   Imaging Personally Reviewed:  Electrocardiogram Personally Reviewed:    COORDINATION OF CARE:  Care Discussed with Consultants/Other Providers [Y/N]:  Prior or Outpatient Records Reviewed [Y/N]:

## 2020-03-07 NOTE — PROGRESS NOTE ADULT - PROBLEM SELECTOR PLAN 1
AM  with +AG and BHOB. pH 7.29 Mild DKA, Lantus was given and FS were controlled previously on this regimen with no change in IV steroid dosing. Unclear if patient had bedtime food/juice but unable to obtain that info from patient    -test BG AC/HS  -C/w Lantus 24 units QAM.  -d/c humalog 10 units premeal and make NPO  -q4h humalog mod dose sliding scale + bmp,vbg,bhob q4h.     Discharge plan: basal/bolus  -Even though pt is alert and talking, she still unable to care for her DM. Per primary team plan to discharge to rehab when more stable  -f/u outpt w/Dr Saul. Will make apt closer to discharge    --Plan discussed with pt's team and RN.   Contact info: [phone] (24/7). pager [phone] AM  with +AG and BHOB. pH 7.29 Mild DKA, Lantus was given and FS were controlled previously on this regimen with no change in IV steroid dosing. Unclear if patient had bedtime food/juice but unable to obtain that info from patient    -test BG AC/HS  -C/w Lantus 24 units QAM.  -d/c humalog 10 units premeal and make NPO.   -IVF   -q4h humalog mod dose sliding scale + bmp,vbg,bhob q4h until AG closes. Then may resume patient on previous regimen of humalog 10 units tidac with moderate sliding scale tidac and qhs.     Discharge plan: basal/bolus  -Even though pt is alert and talking, she still unable to care for her DM. Per primary team plan to discharge to rehab when more stable  -f/u outpt w/Dr Saul. Will make apt closer to discharge    --Plan discussed with pt's team and RN.   Contact info: [phone] (24/7). pager [phone] AM  with +AG and BHOB. pH 7.29 Mild DKA, Lantus was given and FS were controlled previously on this regimen with no change in IV steroid dosing. Unclear if patient had bedtime food/juice but unable to obtain that info from patient    -test BG AC/HS  -C/w Lantus 24 units QAM.  -d/c humalog 10 units premeal and make NPO.   -IVF   -q4h humalog mod dose sliding scale + bmp,vbg,bhob q4h until AG closes. Then may resume patient on previous regimen of humalog 10 units tidac with moderate sliding scale tidac and qhs.     Discharge plan: basal/bolus  -Even though pt is alert and talking, she still unable to care for her DM. Per primary team plan to discharge to rehab when more stable  -f/u outpt w/Dr Saul. Will make apt closer to discharge    -Plan discussed with pt's team and RN.

## 2020-03-07 NOTE — CONSULT NOTE ADULT - ASSESSMENT
64F w/ a PMHx arthritis, hypothyroidism, pulmonary HTN, DM on insulin, HTN, COPD/CHRIS on noctournal CPAP and 3L home), RA on chronic prednisone, chronic tremors, SBO s/p ex lap with lysis of adhesions c/b evisceration p/w agitation 3 weeks of unclear etiology, found to be significantly hypothyroid (), MRI brain found to have small right sided SDH, hospital course c/b catatonia improved with Ativan further c/b patient "refusing insulin" inducing ketosis (B-HB 4.1->2.4, pH 7.30, AGAP 20) inducing mild DKA not requiring insulin gtt at this time.    Recs:  -start LR @ 150cc/hr   -trend bmp, mg, phos, B-HB, pH Q4H  -suggest moderate SSI  -Endo recs    Nabor Becerra MD/MS  Internal Medicine, PGY-2  [facility]  Pager# [phone] ([facility])/[phone] ([facility])

## 2020-03-07 NOTE — PROVIDER CONTACT NOTE (MEDICATION) - ACTION/TREATMENT ORDERED:
Dr Rosen made aware, will closely monitor.
MD acknowledged that the pt refused bedtime PO meds and said that he is aware that the patient has been refusing PO medications
MD made aware, seen and examined pt, ABG drawn,neuro check done, VS/POx monitored, will closely monitor pt.
MD said that it is ok to push morning medications for a couple of hours until son gets here
No new orders.
cont to encourage pt to take medication and explain the benefits in taking meds   awaiting for orders
cont to encourage pt to take meds
no new orders.  will give ativan IV
MD to make primary team aware.

## 2020-03-07 NOTE — PROGRESS NOTE ADULT - PROBLEM SELECTOR PLAN 2
-Pt currently on PO Synthroid without consistent adherence   -C/w multivitamins and Protonix administratio at 12 noon instead of am  -C/w Synthroid 175 mcg PO daily. Since pt not taking med can restart Synthroid 87.5 mcg IVP daily  -Make sure med is given in an empty stomach  -Re test TFT in 4-6 weeks. Will be difficult to improve levels if pt doesn't take med consistently.  -Plan discussed with pt's team. Can consider iv administration but this is not doable long term and upon discharge  Contact info: [phone] (24/7). pager [phone] -Pt currently on PO Synthroid without consistent adherence   -C/w multivitamins and Protonix administratio at 12 noon instead of am  -C/w Synthroid 175 mcg PO daily. Since pt not taking med can restart Synthroid 87.5 mcg IVP daily  -Make sure med is given in an empty stomach  -Re test TFT in 4-6 weeks. Will be difficult to improve levels if pt doesn't take med consistently.  -Plan discussed with pt's team. Can consider iv administration but this is not doable long term and upon discharge

## 2020-03-07 NOTE — PROGRESS NOTE ADULT - PROBLEM SELECTOR PLAN 5
-soft diet per speech, tolerating well  -lantus 24U qdaily  -humalog 10U pre-meal  -c/w moderate ISS, endocrine adjusting  -pt refuses insulin often.

## 2020-03-08 LAB
ANION GAP SERPL CALC-SCNC: 17 MMOL/L — SIGNIFICANT CHANGE UP (ref 5–17)
ANION GAP SERPL CALC-SCNC: 19 MMOL/L — HIGH (ref 5–17)
ANION GAP SERPL CALC-SCNC: 21 MMOL/L — HIGH (ref 5–17)
B-OH-BUTYR SERPL-SCNC: 1.5 MMOL/L — HIGH
B-OH-BUTYR SERPL-SCNC: 1.9 MMOL/L — HIGH
B-OH-BUTYR SERPL-SCNC: 2.6 MMOL/L — HIGH
BASE EXCESS BLDV CALC-SCNC: -8.3 MMOL/L — LOW (ref -2–2)
BUN SERPL-MCNC: 10 MG/DL — SIGNIFICANT CHANGE UP (ref 7–23)
BUN SERPL-MCNC: 7 MG/DL — SIGNIFICANT CHANGE UP (ref 7–23)
BUN SERPL-MCNC: 9 MG/DL — SIGNIFICANT CHANGE UP (ref 7–23)
CA-I SERPL-SCNC: 1.17 MMOL/L — SIGNIFICANT CHANGE UP (ref 1.12–1.3)
CALCIUM SERPL-MCNC: 8.6 MG/DL — SIGNIFICANT CHANGE UP (ref 8.4–10.5)
CALCIUM SERPL-MCNC: 9 MG/DL — SIGNIFICANT CHANGE UP (ref 8.4–10.5)
CALCIUM SERPL-MCNC: 9.1 MG/DL — SIGNIFICANT CHANGE UP (ref 8.4–10.5)
CHLORIDE BLDV-SCNC: 115 MMOL/L — HIGH (ref 96–108)
CHLORIDE SERPL-SCNC: 102 MMOL/L — SIGNIFICANT CHANGE UP (ref 96–108)
CHLORIDE SERPL-SCNC: 106 MMOL/L — SIGNIFICANT CHANGE UP (ref 96–108)
CHLORIDE SERPL-SCNC: 106 MMOL/L — SIGNIFICANT CHANGE UP (ref 96–108)
CO2 BLDV-SCNC: 18 MMOL/L — LOW (ref 22–30)
CO2 SERPL-SCNC: 13 MMOL/L — LOW (ref 22–31)
CO2 SERPL-SCNC: 13 MMOL/L — LOW (ref 22–31)
CO2 SERPL-SCNC: 15 MMOL/L — LOW (ref 22–31)
CREAT SERPL-MCNC: 0.78 MG/DL — SIGNIFICANT CHANGE UP (ref 0.5–1.3)
CREAT SERPL-MCNC: 0.85 MG/DL — SIGNIFICANT CHANGE UP (ref 0.5–1.3)
CREAT SERPL-MCNC: 0.94 MG/DL — SIGNIFICANT CHANGE UP (ref 0.5–1.3)
GAS PNL BLDV: 138 MMOL/L — SIGNIFICANT CHANGE UP (ref 135–145)
GAS PNL BLDV: SIGNIFICANT CHANGE UP
GLUCOSE BLDC GLUCOMTR-MCNC: 151 MG/DL — HIGH (ref 70–99)
GLUCOSE BLDC GLUCOMTR-MCNC: 159 MG/DL — HIGH (ref 70–99)
GLUCOSE BLDC GLUCOMTR-MCNC: 163 MG/DL — HIGH (ref 70–99)
GLUCOSE BLDC GLUCOMTR-MCNC: 178 MG/DL — HIGH (ref 70–99)
GLUCOSE BLDC GLUCOMTR-MCNC: 183 MG/DL — HIGH (ref 70–99)
GLUCOSE BLDC GLUCOMTR-MCNC: 193 MG/DL — HIGH (ref 70–99)
GLUCOSE BLDC GLUCOMTR-MCNC: 194 MG/DL — HIGH (ref 70–99)
GLUCOSE BLDC GLUCOMTR-MCNC: 202 MG/DL — HIGH (ref 70–99)
GLUCOSE BLDC GLUCOMTR-MCNC: 211 MG/DL — HIGH (ref 70–99)
GLUCOSE BLDC GLUCOMTR-MCNC: 225 MG/DL — HIGH (ref 70–99)
GLUCOSE BLDV-MCNC: 202 MG/DL — HIGH (ref 70–99)
GLUCOSE SERPL-MCNC: 172 MG/DL — HIGH (ref 70–99)
GLUCOSE SERPL-MCNC: 206 MG/DL — HIGH (ref 70–99)
GLUCOSE SERPL-MCNC: 231 MG/DL — HIGH (ref 70–99)
HCO3 BLDV-SCNC: 17 MMOL/L — LOW (ref 21–29)
HCT VFR BLDA CALC: 32 % — LOW (ref 39–50)
HGB BLD CALC-MCNC: 10.3 G/DL — LOW (ref 11.5–15.5)
HOROWITZ INDEX BLDV+IHG-RTO: SIGNIFICANT CHANGE UP
LACTATE BLDV-MCNC: 1.9 MMOL/L — SIGNIFICANT CHANGE UP (ref 0.7–2)
MAGNESIUM SERPL-MCNC: 1.8 MG/DL — SIGNIFICANT CHANGE UP (ref 1.6–2.6)
MAGNESIUM SERPL-MCNC: 1.9 MG/DL — SIGNIFICANT CHANGE UP (ref 1.6–2.6)
MAGNESIUM SERPL-MCNC: 1.9 MG/DL — SIGNIFICANT CHANGE UP (ref 1.6–2.6)
OTHER CELLS CSF MANUAL: 12 ML/DL — LOW (ref 18–22)
PCO2 BLDV: 34 MMHG — LOW (ref 35–50)
PH BLDV: 7.32 — LOW (ref 7.35–7.45)
PHOSPHATE SERPL-MCNC: 1.9 MG/DL — LOW (ref 2.5–4.5)
PHOSPHATE SERPL-MCNC: 2.2 MG/DL — LOW (ref 2.5–4.5)
PHOSPHATE SERPL-MCNC: 2.6 MG/DL — SIGNIFICANT CHANGE UP (ref 2.5–4.5)
PO2 BLDV: 52 MMHG — HIGH (ref 25–45)
POTASSIUM BLDV-SCNC: 4 MMOL/L — SIGNIFICANT CHANGE UP (ref 3.5–5.3)
POTASSIUM SERPL-MCNC: 4 MMOL/L — SIGNIFICANT CHANGE UP (ref 3.5–5.3)
POTASSIUM SERPL-MCNC: 4.3 MMOL/L — SIGNIFICANT CHANGE UP (ref 3.5–5.3)
POTASSIUM SERPL-MCNC: 4.6 MMOL/L — SIGNIFICANT CHANGE UP (ref 3.5–5.3)
POTASSIUM SERPL-SCNC: 4 MMOL/L — SIGNIFICANT CHANGE UP (ref 3.5–5.3)
POTASSIUM SERPL-SCNC: 4.3 MMOL/L — SIGNIFICANT CHANGE UP (ref 3.5–5.3)
POTASSIUM SERPL-SCNC: 4.6 MMOL/L — SIGNIFICANT CHANGE UP (ref 3.5–5.3)
SAO2 % BLDV: 84 % — SIGNIFICANT CHANGE UP (ref 67–88)
SODIUM SERPL-SCNC: 136 MMOL/L — SIGNIFICANT CHANGE UP (ref 135–145)
SODIUM SERPL-SCNC: 138 MMOL/L — SIGNIFICANT CHANGE UP (ref 135–145)
SODIUM SERPL-SCNC: 138 MMOL/L — SIGNIFICANT CHANGE UP (ref 135–145)

## 2020-03-08 PROCEDURE — 99233 SBSQ HOSP IP/OBS HIGH 50: CPT

## 2020-03-08 PROCEDURE — 93010 ELECTROCARDIOGRAM REPORT: CPT

## 2020-03-08 PROCEDURE — 99233 SBSQ HOSP IP/OBS HIGH 50: CPT | Mod: GC

## 2020-03-08 RX ORDER — SODIUM CHLORIDE 9 MG/ML
1000 INJECTION, SOLUTION INTRAVENOUS
Refills: 0 | Status: DISCONTINUED | OUTPATIENT
Start: 2020-03-08 | End: 2020-03-08

## 2020-03-08 RX ORDER — INSULIN LISPRO 100/ML
VIAL (ML) SUBCUTANEOUS
Refills: 0 | Status: DISCONTINUED | OUTPATIENT
Start: 2020-03-08 | End: 2020-03-27

## 2020-03-08 RX ORDER — ARIPIPRAZOLE 15 MG/1
2.5 TABLET ORAL DAILY
Refills: 0 | Status: DISCONTINUED | OUTPATIENT
Start: 2020-03-08 | End: 2020-03-09

## 2020-03-08 RX ORDER — DEXTROSE MONOHYDRATE, SODIUM CHLORIDE, AND POTASSIUM CHLORIDE 50; .745; 4.5 G/1000ML; G/1000ML; G/1000ML
1000 INJECTION, SOLUTION INTRAVENOUS
Refills: 0 | Status: DISCONTINUED | OUTPATIENT
Start: 2020-03-08 | End: 2020-03-08

## 2020-03-08 RX ORDER — HALOPERIDOL DECANOATE 100 MG/ML
0.5 INJECTION INTRAMUSCULAR EVERY 4 HOURS
Refills: 0 | Status: DISCONTINUED | OUTPATIENT
Start: 2020-03-08 | End: 2020-03-10

## 2020-03-08 RX ADMIN — PRIMIDONE 50 MILLIGRAM(S): 250 TABLET ORAL at 22:49

## 2020-03-08 RX ADMIN — Medication 25 MILLIGRAM(S): at 17:07

## 2020-03-08 RX ADMIN — Medication 12.5 MILLIGRAM(S): at 09:44

## 2020-03-08 RX ADMIN — Medication 2: at 14:25

## 2020-03-08 RX ADMIN — HALOPERIDOL DECANOATE 0.5 MILLIGRAM(S): 100 INJECTION INTRAMUSCULAR at 14:01

## 2020-03-08 RX ADMIN — HEPARIN SODIUM 5000 UNIT(S): 5000 INJECTION INTRAVENOUS; SUBCUTANEOUS at 14:25

## 2020-03-08 RX ADMIN — Medication 0.5 MILLIGRAM(S): at 22:49

## 2020-03-08 RX ADMIN — Medication 4: at 22:48

## 2020-03-08 RX ADMIN — SIMVASTATIN 20 MILLIGRAM(S): 20 TABLET, FILM COATED ORAL at 22:49

## 2020-03-08 RX ADMIN — Medication 4: at 17:06

## 2020-03-08 RX ADMIN — Medication 2: at 09:43

## 2020-03-08 RX ADMIN — Medication 2: at 06:46

## 2020-03-08 RX ADMIN — Medication 4: at 01:49

## 2020-03-08 RX ADMIN — INSULIN GLARGINE 24 UNIT(S): 100 INJECTION, SOLUTION SUBCUTANEOUS at 06:46

## 2020-03-08 RX ADMIN — HEPARIN SODIUM 5000 UNIT(S): 5000 INJECTION INTRAVENOUS; SUBCUTANEOUS at 06:45

## 2020-03-08 RX ADMIN — SODIUM CHLORIDE 125 MILLILITER(S): 9 INJECTION INTRAMUSCULAR; INTRAVENOUS; SUBCUTANEOUS at 00:01

## 2020-03-08 RX ADMIN — HEPARIN SODIUM 5000 UNIT(S): 5000 INJECTION INTRAVENOUS; SUBCUTANEOUS at 22:51

## 2020-03-08 RX ADMIN — NYSTATIN CREAM 1 APPLICATION(S): 100000 CREAM TOPICAL at 06:47

## 2020-03-08 RX ADMIN — Medication 1 MILLIGRAM(S): at 04:26

## 2020-03-08 RX ADMIN — Medication 10 UNIT(S): at 19:50

## 2020-03-08 NOTE — PROGRESS NOTE ADULT - PROBLEM SELECTOR PLAN 4
-T4 wnl  -T3 LOW 53  -now on IV synthyroid 87.5 due to refusal to take PO>   -outpatient repeat tfts, apprecaite endo recs

## 2020-03-08 NOTE — PROGRESS NOTE ADULT - PROBLEM SELECTOR PLAN 1
Lantus was given and FS were controlled previously on this regimen with no change in IV steroid dosing. Mild dka improving slowly on lantus and humalog sliding scale. ICU rejected. Complicated by patient's refusal of labs this AM.   Now glucose at 163 but no labs drawn yet to check AG or BHOB.   -test BG AC/HS  -C/w Lantus 24 units QAM.  -d/c humalog 10 units premeal and make NPO.   -IVF   -q4h humalog mod dose sliding scale + bmp,vbg,bhob q4h until AG closes. Then may resume patient on previous regimen of humalog 10 units tidac with moderate sliding scale tidac and qhs.   -If patient's AG does not close with glucoses <200, start dextrose IVF at 50ml/hr and continue humalog moderate dose sliding scale q4h.     Discharge plan: basal/bolus  -Even though pt is alert and talking, she still unable to care for her DM. Per primary team plan to discharge to rehab when more stable  -f/u outpt w/Dr Saul. Will make apt closer to discharge      -Plan discussed with pt's team.   Huseyin Traore MD, Endocrine Fellow   Pager  from 9-5PM. After hours and on weekends please call [phone] Lantus was given and FS were controlled previously on this regimen with no change in IV steroid dosing. Mild dka improving slowly on lantus and humalog sliding scale. ICU rejected. Complicated by patient's refusal of labs this AM.   AG and BHOB improving with glucoses <200, suspect starvation ketosis now with lactic acidosis   -test BG AC/HS  -C/w Lantus 24 units QAM.  -restart humalog 10 units premeal and diet  -humalog moderate sliding scale tidac and qhs   -IVF, may d/c d5 drip   -patient's lactic acidosis is currently worsening would need additional IVF     Discharge plan: basal/bolus  -Even though pt is alert and talking, she still unable to care for her DM. Per primary team plan to discharge to rehab when more stable  -f/u outpt w/Dr Saul. Will make apt closer to discharge      -Plan discussed with pt's team.   Huseyin Traroe MD, Endocrine Fellow   Pager  from 9-5PM. After hours and on weekends please call [phone]

## 2020-03-08 NOTE — PROGRESS NOTE ADULT - PROBLEM SELECTOR PLAN 2
Pt with AG to 22, Bicarb 12, BHB 4 yesterday with vbg pH 7.29 - mild DKA  -pt currently NPO, trending BHB, BMP, VBG q4hrs  -moderate ISS  -IVF hydration  -the above plan has been difficult to perform due to patient's agitation.   -lantus 24U qdaily  -endocrine team following. #DKA  Pt with AG to 22, Bicarb 12, BHB 4 yesterday with vbg pH 7.29 - mild DKA  -pt currently NPO, trending BHB, BMP, VBG q4hrs  -moderate ISS  -IVF hydration  -the above plan has been difficult to perform due to patient's agitation.   -lantus 24U qdaily  -endocrine team following.

## 2020-03-08 NOTE — PROGRESS NOTE ADULT - PROBLEM SELECTOR PLAN 1
Likely underlying rapidly progressive dementia, hospital delirium.   -period of catatonic state which improved with ativan, continues on ativan  -per neuro and psych, c/w Ativan 0.5mg TID, son agreeable.   -psych considering starting abilify - will follow up with family to confirm they are okay with this.  -Will reach out to psych about setting family meeting about acceptable medications and disposition   -Hypothyroidism possible etiology; , free T4 0.3. Endocrine following. Now on PO 175mcg synthroid qd. Patient continues to refuse medications.   -MRI brain 3mm right sided SDH no neurosurgical intervention   -LP 2/6 CSF studies wnl.  -repeat PT evaluation; pt refusing Likely underlying rapidly progressive dementia, hospital delirium.   -period of catatonic state which improved with ativan, continues on ativan  -per neuro and psych, c/w Ativan 0.5mg TID, son agreeable.   -psych team discussed abilify with son, Norris, he agrees. Starting 2.5mg abilify qd  -haldol 0.5mg IV PRN agitation.   -Hypothyroidism possible etiology; , free T4 0.3. Endocrine following. Now on PO 175mcg synthroid qd. Patient continues to refuse medications.   -MRI brain 3mm right sided SDH no neurosurgical intervention   -LP 2/6 CSF studies wnl.  -repeat PT evaluation; pt refusing

## 2020-03-08 NOTE — PROGRESS NOTE ADULT - ATTENDING COMMENTS
Seen at bedside. Pt remains in mild DKA. Seen by MICU overnight, not a candidate for MICU. Remains NPO, on Lantus. Continuing to check BMP, VBG and beta hydroxy Q4 hours however given agitation, pt has made this difficult. If AG does not close and FS drop <200 will need to start D5. c/w moderate dose sliding scale Q4 hours.   Psych discussed with pt's son Norris mood stabilizing agents. Recommended Abilify 2.5mg with uptitration to 5mg if tolerated. Additionally could use Haldol 0.5mg IV Q4 hours PRN for agitation. I also discussed the proposed regimen with Norris. He was okay with this regimen and HE GAVE HIS CONSENT for these medications to be given Seen at bedside. Pt remains in mild DKA. Seen by MICU overnight, not a candidate for MICU. Remains NPO, on Lantus. Continuing to check BMP, VBG and beta hydroxy Q4 hours however given agitation, pt has made this difficult. If AG does not close and FS drop <200 will need to start D5. c/w moderate dose sliding scale Q4 hours.   Psych discussed with pt's son Norris mood stabilizing agents. Recommended Abilify 2.5mg with uptitration to 5mg if tolerated. Additionally could use Haldol 0.5mg IV Q4 hours PRN for agitation. I also discussed the proposed regimen with Norris. He was okay with given his mother Abilify and Haldol and Norris GAVE HIS CONSENT for these medications to be given.

## 2020-03-08 NOTE — PROVIDER CONTACT NOTE (OTHER) - REASON
pt. was being very disruptive, pulled out IV access and RN was unable to retrieve venous blood gas due to pt. not being cooperative

## 2020-03-08 NOTE — PROGRESS NOTE BEHAVIORAL HEALTH - NSBHCONSULTRECOMMENDOTHER_PSY_A_CORE FT
1) continue ativan 0.5 mg po TID for anxiety/irritable mood, had presentation of catatonia 7-10 days ago and responded well to ativan    2) it should be noted, that due to pt's vascular lesions (hx of subacute stroke/SDH), pt may be at a new cognitive baseline, rule out dementia process. her mood lability symptoms have improved since her admission.     3) due to worsening agitation and mood lability symptoms, it would be prudent to again consider an anti-psychotic. spoke with Norris, pt's son and HCP and he gives consent to give abilify 2.5 mg qdaily, with possible plan to raise to 5 mg if needed for mood instability; please f/u QTc < 500    4) for acute agitation, consider haldol 0.5 mg IV Q4hr PRN, f/u QTc < 500. if 0.5 mg ineffective, consider 1 mg IV Q4hr instead. Norris also aware of this recommendation and gives consent    5) no indication for inpt psychiatric admission    6) pt has 24-7 care at home, this would be suitable discharge plan. if family is hesitant about this, LTC options can be discussed, such as nursing home setting

## 2020-03-08 NOTE — PROGRESS NOTE ADULT - PROBLEM SELECTOR PLAN 2
-C/w multivitamins and Protonix administratio at 12 noon instead of am  -Synthroid 87.5 mcg IVP daily  -Make sure med is given in an empty stomach  -Re test TFT in 4-6 weeks. Will be difficult to improve levels if pt doesn't take med consistently.

## 2020-03-08 NOTE — PROVIDER CONTACT NOTE (OTHER) - REASON
Pt. refused all morning PO medications and RN was unable to get morning labs due to pt being combative and agitated

## 2020-03-08 NOTE — PROGRESS NOTE ADULT - SUBJECTIVE AND OBJECTIVE BOX
Parminder Cavanaugh, pgy 1  [facility]  [phone]  After 7pm; page night float    Patient is a 64y old  Female who presents with a chief complaint of change in mental status (07 Mar 2020 23:01)      SUBJECTIVE / OVERNIGHT EVENTS: Managed for mild DKA last night. MICU team called to consult and recommended continued floor management. Patient especially combative last night with ripping out IV's, spitting and yelling at nursing staff, refusing to take medications.     ADDITIONAL REVIEW OF SYSTEMS:    MEDICATIONS  (STANDING):  budesonide  80 MICROgram(s)/formoterol 4.5 MICROgram(s) Inhaler 2 Puff(s) Inhalation two times a day  dextrose 5%. 1000 milliLiter(s) (50 mL/Hr) IV Continuous <Continuous>  dextrose 50% Injectable 12.5 Gram(s) IV Push once  dextrose 50% Injectable 25 Gram(s) IV Push once  dextrose 50% Injectable 25 Gram(s) IV Push once  heparin  Injectable 5000 Unit(s) SubCutaneous every 8 hours  influenza   Vaccine 0.5 milliLiter(s) IntraMuscular once  insulin glargine Injectable (LANTUS) 24 Unit(s) SubCutaneous <User Schedule>  insulin lispro (HumaLOG) corrective regimen sliding scale   SubCutaneous every 4 hours  insulin lispro Injectable (HumaLOG) 10 Unit(s) SubCutaneous three times a day with meals  levothyroxine Injectable 87.5 MICROGram(s) IV Push once  LORazepam     Tablet 0.5 milliGRAM(s) Oral three times a day  losartan 25 milliGRAM(s) Oral daily  methylPREDNISolone sodium succinate Injectable 12.5 milliGRAM(s) IV Push daily  metoprolol tartrate 25 milliGRAM(s) Oral two times a day  multivitamin/minerals/iron Oral Solution (CENTRUM) 15 milliLiter(s) Oral daily  nystatin Powder 1 Application(s) Topical every 12 hours  pantoprazole    Tablet 40 milliGRAM(s) Oral <User Schedule>  primidone 50 milliGRAM(s) Oral at bedtime  simvastatin 20 milliGRAM(s) Oral at bedtime  sodium chloride 0.9%. 1000 milliLiter(s) (150 mL/Hr) IV Continuous <Continuous>  thiamine 100 milliGRAM(s) Oral daily    MEDICATIONS  (PRN):  acetaminophen   Tablet .. 975 milliGRAM(s) Oral every 6 hours PRN Moderate Pain (4 - 6), Severe Pain (7 - 10)  acetaminophen  Suppository .. 650 milliGRAM(s) Rectal every 6 hours PRN Temp greater or equal to 38C (100.4F)  albuterol/ipratropium for Nebulization 3 milliLiter(s) Nebulizer every 6 hours PRN Shortness of Breath and/or Wheezing  dextrose 40% Gel 15 Gram(s) Oral once PRN Blood Glucose LESS THAN 70 milliGRAM(s)/deciLiter  glucagon  Injectable 1 milliGRAM(s) IntraMuscular once PRN Glucose <70 milliGRAM(s)/deciLiter  melatonin 3 milliGRAM(s) Oral at bedtime PRN Insomnia      CAPILLARY BLOOD GLUCOSE      POCT Blood Glucose.: 183 mg/dL (08 Mar 2020 06:41)  POCT Blood Glucose.: 225 mg/dL (08 Mar 2020 01:29)  POCT Blood Glucose.: 230 mg/dL (07 Mar 2020 21:22)  POCT Blood Glucose.: 286 mg/dL (07 Mar 2020 17:17)  POCT Blood Glucose.: 310 mg/dL (07 Mar 2020 13:07)  POCT Blood Glucose.: 280 mg/dL (07 Mar 2020 08:29)    I&O's Summary    07 Mar 2020 06:01  -  08 Mar 2020 07:00  --------------------------------------------------------  IN: 1725 mL / OUT: 0 mL / NET: 1725 mL        PHYSICAL EXAM:  Vital Signs Last 24 Hrs  T(C): 36.6 (08 Mar 2020 04:25), Max: 36.9 (07 Mar 2020 08:35)  T(F): 97.9 (08 Mar 2020 04:25), Max: 98.5 (07 Mar 2020 08:35)  HR: 98 (08 Mar 2020 04:25) (86 - 101)  BP: 127/85 (08 Mar 2020 04:25) (113/71 - 131/78)  BP(mean): --  RR: 18 (08 Mar 2020 04:25) (18 - 18)  SpO2: 98% (08 Mar 2020 04:25) (96% - 99%)      LABS:                        9.8    9.87  )-----------( 347      ( 07 Mar 2020 07:10 )             34.2     03-08    136  |  102  |  10  ----------------------------<  231<H>  4.6   |  13<L>  |  0.94    Ca    9.1      08 Mar 2020 01:50  Phos  2.6     03-08  Mg     1.9     03-08    TPro  8.2  /  Alb  4.2  /  TBili  0.2  /  DBili  x   /  AST  7<L>  /  ALT  19  /  AlkPhos  101  03-07              Culture - Blood (collected 05 Mar 2020 12:46)  Source: .Blood Blood-Peripheral  Preliminary Report (06 Mar 2020 13:02):    No growth to date.    Culture - Blood (collected 05 Mar 2020 12:46)  Source: .Blood Blood  Preliminary Report (06 Mar 2020 13:02):    No growth to date.        RADIOLOGY & ADDITIONAL TESTS:  Results Reviewed:   Imaging Personally Reviewed:  Electrocardiogram Personally Reviewed:    COORDINATION OF CARE:  Care Discussed with Consultants/Other Providers [Y/N]:  Prior or Outpatient Records Reviewed [Y/N]: Parminder Cavanaugh, pgy 1  [facility]  [phone]  After 7pm; page night float    Patient is a 64y old  Female who presents with a chief complaint of change in mental status (07 Mar 2020 23:01)      SUBJECTIVE / OVERNIGHT EVENTS: Managed for mild DKA last night. MICU team called to consult and recommended continued floor management. Patient especially combative last night with ripping out IV's, spitting and yelling at nursing staff, refusing to take medications. Has not been able to get full IV fluids.  Has started calling herself Loren Rush in order to not be given insulin - stating that her blood sugar levels are being mixed up with another patient and she does not have any hyperglycemia. Assured patient this was not the case. At times, pt was redirectable, when we focused on her comfort while in bed.     ADDITIONAL REVIEW OF SYSTEMS:    MEDICATIONS  (STANDING):  budesonide  80 MICROgram(s)/formoterol 4.5 MICROgram(s) Inhaler 2 Puff(s) Inhalation two times a day  dextrose 5%. 1000 milliLiter(s) (50 mL/Hr) IV Continuous <Continuous>  dextrose 50% Injectable 12.5 Gram(s) IV Push once  dextrose 50% Injectable 25 Gram(s) IV Push once  dextrose 50% Injectable 25 Gram(s) IV Push once  heparin  Injectable 5000 Unit(s) SubCutaneous every 8 hours  influenza   Vaccine 0.5 milliLiter(s) IntraMuscular once  insulin glargine Injectable (LANTUS) 24 Unit(s) SubCutaneous <User Schedule>  insulin lispro (HumaLOG) corrective regimen sliding scale   SubCutaneous every 4 hours  insulin lispro Injectable (HumaLOG) 10 Unit(s) SubCutaneous three times a day with meals  levothyroxine Injectable 87.5 MICROGram(s) IV Push once  LORazepam     Tablet 0.5 milliGRAM(s) Oral three times a day  losartan 25 milliGRAM(s) Oral daily  methylPREDNISolone sodium succinate Injectable 12.5 milliGRAM(s) IV Push daily  metoprolol tartrate 25 milliGRAM(s) Oral two times a day  multivitamin/minerals/iron Oral Solution (CENTRUM) 15 milliLiter(s) Oral daily  nystatin Powder 1 Application(s) Topical every 12 hours  pantoprazole    Tablet 40 milliGRAM(s) Oral <User Schedule>  primidone 50 milliGRAM(s) Oral at bedtime  simvastatin 20 milliGRAM(s) Oral at bedtime  sodium chloride 0.9%. 1000 milliLiter(s) (150 mL/Hr) IV Continuous <Continuous>  thiamine 100 milliGRAM(s) Oral daily    MEDICATIONS  (PRN):  acetaminophen   Tablet .. 975 milliGRAM(s) Oral every 6 hours PRN Moderate Pain (4 - 6), Severe Pain (7 - 10)  acetaminophen  Suppository .. 650 milliGRAM(s) Rectal every 6 hours PRN Temp greater or equal to 38C (100.4F)  albuterol/ipratropium for Nebulization 3 milliLiter(s) Nebulizer every 6 hours PRN Shortness of Breath and/or Wheezing  dextrose 40% Gel 15 Gram(s) Oral once PRN Blood Glucose LESS THAN 70 milliGRAM(s)/deciLiter  glucagon  Injectable 1 milliGRAM(s) IntraMuscular once PRN Glucose <70 milliGRAM(s)/deciLiter  melatonin 3 milliGRAM(s) Oral at bedtime PRN Insomnia      CAPILLARY BLOOD GLUCOSE      POCT Blood Glucose.: 183 mg/dL (08 Mar 2020 06:41)  POCT Blood Glucose.: 225 mg/dL (08 Mar 2020 01:29)  POCT Blood Glucose.: 230 mg/dL (07 Mar 2020 21:22)  POCT Blood Glucose.: 286 mg/dL (07 Mar 2020 17:17)  POCT Blood Glucose.: 310 mg/dL (07 Mar 2020 13:07)  POCT Blood Glucose.: 280 mg/dL (07 Mar 2020 08:29)    I&O's Summary    07 Mar 2020 06:01  -  08 Mar 2020 07:00  --------------------------------------------------------  IN: 1725 mL / OUT: 0 mL / NET: 1725 mL        PHYSICAL EXAM:  Vital Signs Last 24 Hrs  T(C): 36.6 (08 Mar 2020 04:25), Max: 36.9 (07 Mar 2020 08:35)  T(F): 97.9 (08 Mar 2020 04:25), Max: 98.5 (07 Mar 2020 08:35)  HR: 98 (08 Mar 2020 04:25) (86 - 101)  BP: 127/85 (08 Mar 2020 04:25) (113/71 - 131/78)  BP(mean): --  RR: 18 (08 Mar 2020 04:25) (18 - 18)  SpO2: 98% (08 Mar 2020 04:25) (96% - 99%)      LABS:                        9.8    9.87  )-----------( 347      ( 07 Mar 2020 07:10 )             34.2     03-08    136  |  102  |  10  ----------------------------<  231<H>  4.6   |  13<L>  |  0.94    Ca    9.1      08 Mar 2020 01:50  Phos  2.6     03-08  Mg     1.9     03-08    TPro  8.2  /  Alb  4.2  /  TBili  0.2  /  DBili  x   /  AST  7<L>  /  ALT  19  /  AlkPhos  101  03-07              Culture - Blood (collected 05 Mar 2020 12:46)  Source: .Blood Blood-Peripheral  Preliminary Report (06 Mar 2020 13:02):    No growth to date.    Culture - Blood (collected 05 Mar 2020 12:46)  Source: .Blood Blood  Preliminary Report (06 Mar 2020 13:02):    No growth to date.        RADIOLOGY & ADDITIONAL TESTS:  Results Reviewed:   Imaging Personally Reviewed:  Electrocardiogram Personally Reviewed:    COORDINATION OF CARE:  Care Discussed with Consultants/Other Providers [Y/N]:  Prior or Outpatient Records Reviewed [Y/N]: Parminder Cavanaugh, pgy 1  [facility]  [phone]  After 7pm; page night float    Patient is a 64y old  Female who presents with a chief complaint of change in mental status (07 Mar 2020 23:01)      SUBJECTIVE / OVERNIGHT EVENTS: Managed for mild DKA last night. MICU team called to consult and recommended continued floor management. Patient especially combative last night with ripping out IV's, spitting and yelling at nursing staff, refusing to take medications. Has not been able to get full IV fluids.  Has started calling herself Loren Rush in order to not be given insulin - stating that her blood sugar levels are being mixed up with another patient and she does not have any hyperglycemia. Assured patient this was not the case. At times, pt was redirectable, when we focused on her comfort while in bed. Pt is alert and mentating at her baseline, coming up with complex stories.     ADDITIONAL REVIEW OF SYSTEMS: Unable to assess.     MEDICATIONS  (STANDING):  budesonide  80 MICROgram(s)/formoterol 4.5 MICROgram(s) Inhaler 2 Puff(s) Inhalation two times a day  dextrose 5%. 1000 milliLiter(s) (50 mL/Hr) IV Continuous <Continuous>  dextrose 50% Injectable 12.5 Gram(s) IV Push once  dextrose 50% Injectable 25 Gram(s) IV Push once  dextrose 50% Injectable 25 Gram(s) IV Push once  heparin  Injectable 5000 Unit(s) SubCutaneous every 8 hours  influenza   Vaccine 0.5 milliLiter(s) IntraMuscular once  insulin glargine Injectable (LANTUS) 24 Unit(s) SubCutaneous <User Schedule>  insulin lispro (HumaLOG) corrective regimen sliding scale   SubCutaneous every 4 hours  insulin lispro Injectable (HumaLOG) 10 Unit(s) SubCutaneous three times a day with meals  levothyroxine Injectable 87.5 MICROGram(s) IV Push once  LORazepam     Tablet 0.5 milliGRAM(s) Oral three times a day  losartan 25 milliGRAM(s) Oral daily  methylPREDNISolone sodium succinate Injectable 12.5 milliGRAM(s) IV Push daily  metoprolol tartrate 25 milliGRAM(s) Oral two times a day  multivitamin/minerals/iron Oral Solution (CENTRUM) 15 milliLiter(s) Oral daily  nystatin Powder 1 Application(s) Topical every 12 hours  pantoprazole    Tablet 40 milliGRAM(s) Oral <User Schedule>  primidone 50 milliGRAM(s) Oral at bedtime  simvastatin 20 milliGRAM(s) Oral at bedtime  sodium chloride 0.9%. 1000 milliLiter(s) (150 mL/Hr) IV Continuous <Continuous>  thiamine 100 milliGRAM(s) Oral daily    MEDICATIONS  (PRN):  acetaminophen   Tablet .. 975 milliGRAM(s) Oral every 6 hours PRN Moderate Pain (4 - 6), Severe Pain (7 - 10)  acetaminophen  Suppository .. 650 milliGRAM(s) Rectal every 6 hours PRN Temp greater or equal to 38C (100.4F)  albuterol/ipratropium for Nebulization 3 milliLiter(s) Nebulizer every 6 hours PRN Shortness of Breath and/or Wheezing  dextrose 40% Gel 15 Gram(s) Oral once PRN Blood Glucose LESS THAN 70 milliGRAM(s)/deciLiter  glucagon  Injectable 1 milliGRAM(s) IntraMuscular once PRN Glucose <70 milliGRAM(s)/deciLiter  melatonin 3 milliGRAM(s) Oral at bedtime PRN Insomnia      CAPILLARY BLOOD GLUCOSE      POCT Blood Glucose.: 183 mg/dL (08 Mar 2020 06:41)  POCT Blood Glucose.: 225 mg/dL (08 Mar 2020 01:29)  POCT Blood Glucose.: 230 mg/dL (07 Mar 2020 21:22)  POCT Blood Glucose.: 286 mg/dL (07 Mar 2020 17:17)  POCT Blood Glucose.: 310 mg/dL (07 Mar 2020 13:07)  POCT Blood Glucose.: 280 mg/dL (07 Mar 2020 08:29)    I&O's Summary    07 Mar 2020 06:01  -  08 Mar 2020 07:00  --------------------------------------------------------  IN: 1725 mL / OUT: 0 mL / NET: 1725 mL        PHYSICAL EXAM:  Vital Signs Last 24 Hrs  T(C): 36.6 (08 Mar 2020 04:25), Max: 36.9 (07 Mar 2020 08:35)  T(F): 97.9 (08 Mar 2020 04:25), Max: 98.5 (07 Mar 2020 08:35)  HR: 98 (08 Mar 2020 04:25) (86 - 101)  BP: 127/85 (08 Mar 2020 04:25) (113/71 - 131/78)  BP(mean): --  RR: 18 (08 Mar 2020 04:25) (18 - 18)  SpO2: 98% (08 Mar 2020 04:25) (96% - 99%)    Unable to assess due to patient agitation.     LABS:                        9.8    9.87  )-----------( 347      ( 07 Mar 2020 07:10 )             34.2     03-08    136  |  102  |  10  ----------------------------<  231<H>  4.6   |  13<L>  |  0.94    Ca    9.1      08 Mar 2020 01:50  Phos  2.6     03-08  Mg     1.9     03-08    TPro  8.2  /  Alb  4.2  /  TBili  0.2  /  DBili  x   /  AST  7<L>  /  ALT  19  /  AlkPhos  101  03-07              Culture - Blood (collected 05 Mar 2020 12:46)  Source: .Blood Blood-Peripheral  Preliminary Report (06 Mar 2020 13:02):    No growth to date.    Culture - Blood (collected 05 Mar 2020 12:46)  Source: .Blood Blood  Preliminary Report (06 Mar 2020 13:02):    No growth to date.        RADIOLOGY & ADDITIONAL TESTS:  Results Reviewed:   Imaging Personally Reviewed:  Electrocardiogram Personally Reviewed:    COORDINATION OF CARE:  Care Discussed with Consultants/Other Providers [Y/N]:  Prior or Outpatient Records Reviewed [Y/N]:

## 2020-03-08 NOTE — PROGRESS NOTE ADULT - SUBJECTIVE AND OBJECTIVE BOX
Chief Complaint: T2DM and hypothyroidism     History: Patient is acutely agitated with no insight into her medical disorders.     MEDICATIONS  (STANDING):  budesonide  80 MICROgram(s)/formoterol 4.5 MICROgram(s) Inhaler 2 Puff(s) Inhalation two times a day  dextrose 5%. 1000 milliLiter(s) (50 mL/Hr) IV Continuous <Continuous>  dextrose 50% Injectable 12.5 Gram(s) IV Push once  dextrose 50% Injectable 25 Gram(s) IV Push once  dextrose 50% Injectable 25 Gram(s) IV Push once  heparin  Injectable 5000 Unit(s) SubCutaneous every 8 hours  influenza   Vaccine 0.5 milliLiter(s) IntraMuscular once  insulin glargine Injectable (LANTUS) 24 Unit(s) SubCutaneous <User Schedule>  insulin lispro (HumaLOG) corrective regimen sliding scale   SubCutaneous every 4 hours  insulin lispro Injectable (HumaLOG) 10 Unit(s) SubCutaneous three times a day with meals  levothyroxine Injectable 87.5 MICROGram(s) IV Push once  LORazepam     Tablet 0.5 milliGRAM(s) Oral three times a day  losartan 25 milliGRAM(s) Oral daily  methylPREDNISolone sodium succinate Injectable 12.5 milliGRAM(s) IV Push daily  metoprolol tartrate 25 milliGRAM(s) Oral two times a day  multivitamin/minerals/iron Oral Solution (CENTRUM) 15 milliLiter(s) Oral daily  nystatin Powder 1 Application(s) Topical every 12 hours  pantoprazole    Tablet 40 milliGRAM(s) Oral <User Schedule>  primidone 50 milliGRAM(s) Oral at bedtime  simvastatin 20 milliGRAM(s) Oral at bedtime  sodium chloride 0.9%. 1000 milliLiter(s) (150 mL/Hr) IV Continuous <Continuous>  thiamine 100 milliGRAM(s) Oral daily    MEDICATIONS  (PRN):  acetaminophen   Tablet .. 975 milliGRAM(s) Oral every 6 hours PRN Moderate Pain (4 - 6), Severe Pain (7 - 10)  acetaminophen  Suppository .. 650 milliGRAM(s) Rectal every 6 hours PRN Temp greater or equal to 38C (100.4F)  albuterol/ipratropium for Nebulization 3 milliLiter(s) Nebulizer every 6 hours PRN Shortness of Breath and/or Wheezing  dextrose 40% Gel 15 Gram(s) Oral once PRN Blood Glucose LESS THAN 70 milliGRAM(s)/deciLiter  glucagon  Injectable 1 milliGRAM(s) IntraMuscular once PRN Glucose <70 milliGRAM(s)/deciLiter  melatonin 3 milliGRAM(s) Oral at bedtime PRN Insomnia      Allergies    Depakote (Other)  Seroquel (Other (Severe))    Intolerances        ROS: unable to obtain due to mental status     PHYSICAL EXAM:  VITALS: T(C): 36.6 (03-08-20 @ 04:25)  T(F): 97.9 (03-08-20 @ 04:25), Max: 98.1 (03-07-20 @ 12:10)  HR: 98 (03-08-20 @ 04:25) (86 - 101)  BP: 127/85 (03-08-20 @ 04:25) (113/71 - 127/85)  RR:  (18 - 18)  SpO2:  (96% - 99%)  Wt(kg): --  GENERAL: NAD, well-groomed, well-developed  EYES: No proptosis,  anicteric  HEENT:  Atraumatic, Normocephalic, moist mucous membranes  NEURO: AOx2, moves all extremities spontaenuously   PSYCH:  reactive affect, euthymic mood      POCT Blood Glucose.: 163 mg/dL (03-08-20 @ 11:21)  POCT Blood Glucose.: 151 mg/dL (03-08-20 @ 09:49)  POCT Blood Glucose.: 159 mg/dL (03-08-20 @ 09:41)  POCT Blood Glucose.: 183 mg/dL (03-08-20 @ 06:41)  POCT Blood Glucose.: 225 mg/dL (03-08-20 @ 01:29)  POCT Blood Glucose.: 230 mg/dL (03-07-20 @ 21:22)  POCT Blood Glucose.: 286 mg/dL (03-07-20 @ 17:17)  POCT Blood Glucose.: 310 mg/dL (03-07-20 @ 13:07)  POCT Blood Glucose.: 280 mg/dL (03-07-20 @ 08:29)  POCT Blood Glucose.: 306 mg/dL (03-07-20 @ 05:22)  POCT Blood Glucose.: 180 mg/dL (03-06-20 @ 22:48)  POCT Blood Glucose.: 181 mg/dL (03-06-20 @ 17:40)  POCT Blood Glucose.: 128 mg/dL (03-06-20 @ 13:02)  POCT Blood Glucose.: 151 mg/dL (03-06-20 @ 08:33)  POCT Blood Glucose.: 175 mg/dL (03-06-20 @ 05:56)  POCT Blood Glucose.: 142 mg/dL (03-05-20 @ 22:16)  POCT Blood Glucose.: 158 mg/dL (03-05-20 @ 17:44)  POCT Blood Glucose.: 190 mg/dL (03-05-20 @ 13:31)      03-08    136  |  102  |  10  ----------------------------<  231<H>  4.6   |  13<L>  |  0.94    EGFR if : 75  EGFR if non : 65    Ca    9.1      03-08  Mg     1.9     03-08  Phos  2.6     03-08    TPro  8.2  /  Alb  4.2  /  TBili  0.2  /  DBili  x   /  AST  7<L>  /  ALT  19  /  AlkPhos  101  03-07          Thyroid Function Tests:  02-23 @ 13:25 TSH -- FreeT4 -- T3 53 Anti TPO -- Anti Thyroglobulin Ab -- TSI --  02-13 @ 09:05 TSH 17.80 FreeT4 -- T3 -- Anti TPO -- Anti Thyroglobulin Ab -- TSI --      Hemoglobin A1C, Whole Blood: 9.0 % <H> [4.0 - 5.6] (01-28-20 @ 19:30)  Hemoglobin A1C, Whole Blood: 9.2 % <H> [4.0 - 5.6] (01-27-20 @ 17:36)

## 2020-03-08 NOTE — PROVIDER CONTACT NOTE (OTHER) - REASON
Pt. chanting in questionable tongues RN unable to understand, pt. being very aggressive, spitting and scratching staff. And pt. has been referring to herself as "Rena Rush"

## 2020-03-08 NOTE — PROVIDER CONTACT NOTE (OTHER) - REASON
Pt. chanted in questionable tongues overnight, RN unable to understand, pt. was very aggressive, spitting and scratching staff. And pt. has been referring to herself as "Rena Rush" overnight

## 2020-03-08 NOTE — PROGRESS NOTE ADULT - ASSESSMENT
65 y/o F w/h/o uncontrolled T2DM with unknown complications. Also h/o functional paraplegia, hypothyroidism, pulmonary hypertension, COPD, RA on chronic prednisone, SBO s/p ex lap with lysis of adhesions c/b multifocal pneumonia with AMS of unknown etiology. Pt was on catatonic state that resolved with ativan administration and is now more alert but still confused and refusing meds/treatments on and off   Spoke to team to consider IV administration of Synthroid 87.5mcg daily  and Steroid Methylprednisolone 12.5mg daily since these medications should be taken consistently. However, this is a temporary fix due to the fact that upon discharge pt will need to take this meds via PO.

## 2020-03-09 LAB
ANION GAP SERPL CALC-SCNC: 16 MMOL/L — SIGNIFICANT CHANGE UP (ref 5–17)
B-OH-BUTYR SERPL-SCNC: 1.6 MMOL/L — HIGH
BASE EXCESS BLDV CALC-SCNC: -7.6 MMOL/L — LOW (ref -2–2)
BUN SERPL-MCNC: 11 MG/DL — SIGNIFICANT CHANGE UP (ref 7–23)
CA-I SERPL-SCNC: 1.19 MMOL/L — SIGNIFICANT CHANGE UP (ref 1.12–1.3)
CALCIUM SERPL-MCNC: 9 MG/DL — SIGNIFICANT CHANGE UP (ref 8.4–10.5)
CHLORIDE BLDV-SCNC: 112 MMOL/L — HIGH (ref 96–108)
CHLORIDE SERPL-SCNC: 105 MMOL/L — SIGNIFICANT CHANGE UP (ref 96–108)
CO2 BLDV-SCNC: 18 MMOL/L — LOW (ref 22–30)
CO2 SERPL-SCNC: 16 MMOL/L — LOW (ref 22–31)
CREAT SERPL-MCNC: 1.01 MG/DL — SIGNIFICANT CHANGE UP (ref 0.5–1.3)
GAS PNL BLDV: 137 MMOL/L — SIGNIFICANT CHANGE UP (ref 135–145)
GAS PNL BLDV: SIGNIFICANT CHANGE UP
GAS PNL BLDV: SIGNIFICANT CHANGE UP
GLUCOSE BLDC GLUCOMTR-MCNC: 122 MG/DL — HIGH (ref 70–99)
GLUCOSE BLDC GLUCOMTR-MCNC: 151 MG/DL — HIGH (ref 70–99)
GLUCOSE BLDC GLUCOMTR-MCNC: 156 MG/DL — HIGH (ref 70–99)
GLUCOSE BLDC GLUCOMTR-MCNC: 205 MG/DL — HIGH (ref 70–99)
GLUCOSE BLDC GLUCOMTR-MCNC: 267 MG/DL — HIGH (ref 70–99)
GLUCOSE BLDV-MCNC: 271 MG/DL — HIGH (ref 70–99)
GLUCOSE SERPL-MCNC: 278 MG/DL — HIGH (ref 70–99)
HCO3 BLDV-SCNC: 17 MMOL/L — LOW (ref 21–29)
HCT VFR BLD CALC: 31 % — LOW (ref 34.5–45)
HCT VFR BLDA CALC: 30 % — LOW (ref 39–50)
HGB BLD CALC-MCNC: 9.8 G/DL — LOW (ref 11.5–15.5)
HGB BLD-MCNC: 9.6 G/DL — LOW (ref 11.5–15.5)
LACTATE BLDV-MCNC: 2 MMOL/L — SIGNIFICANT CHANGE UP (ref 0.7–2)
MAGNESIUM SERPL-MCNC: 1.7 MG/DL — SIGNIFICANT CHANGE UP (ref 1.6–2.6)
MCHC RBC-ENTMCNC: 29.5 PG — SIGNIFICANT CHANGE UP (ref 27–34)
MCHC RBC-ENTMCNC: 31 GM/DL — LOW (ref 32–36)
MCV RBC AUTO: 95.4 FL — SIGNIFICANT CHANGE UP (ref 80–100)
NRBC # BLD: 0 /100 WBCS — SIGNIFICANT CHANGE UP (ref 0–0)
OTHER CELLS CSF MANUAL: 10 ML/DL — LOW (ref 18–22)
PCO2 BLDV: 35 MMHG — SIGNIFICANT CHANGE UP (ref 35–50)
PH BLDV: 7.32 — LOW (ref 7.35–7.45)
PHOSPHATE SERPL-MCNC: 1.4 MG/DL — LOW (ref 2.5–4.5)
PLATELET # BLD AUTO: 385 K/UL — SIGNIFICANT CHANGE UP (ref 150–400)
PO2 BLDV: 46 MMHG — HIGH (ref 25–45)
POTASSIUM BLDV-SCNC: 3.7 MMOL/L — SIGNIFICANT CHANGE UP (ref 3.5–5.3)
POTASSIUM SERPL-MCNC: 3.9 MMOL/L — SIGNIFICANT CHANGE UP (ref 3.5–5.3)
POTASSIUM SERPL-SCNC: 3.9 MMOL/L — SIGNIFICANT CHANGE UP (ref 3.5–5.3)
RBC # BLD: 3.25 M/UL — LOW (ref 3.8–5.2)
RBC # FLD: 14.7 % — HIGH (ref 10.3–14.5)
SAO2 % BLDV: 77 % — SIGNIFICANT CHANGE UP (ref 67–88)
SODIUM SERPL-SCNC: 137 MMOL/L — SIGNIFICANT CHANGE UP (ref 135–145)
WBC # BLD: 10.8 K/UL — HIGH (ref 3.8–10.5)
WBC # FLD AUTO: 10.8 K/UL — HIGH (ref 3.8–10.5)

## 2020-03-09 PROCEDURE — 99232 SBSQ HOSP IP/OBS MODERATE 35: CPT | Mod: GC

## 2020-03-09 PROCEDURE — 99233 SBSQ HOSP IP/OBS HIGH 50: CPT | Mod: GC

## 2020-03-09 PROCEDURE — 99232 SBSQ HOSP IP/OBS MODERATE 35: CPT

## 2020-03-09 RX ORDER — HALOPERIDOL DECANOATE 100 MG/ML
1 INJECTION INTRAMUSCULAR ONCE
Refills: 0 | Status: COMPLETED | OUTPATIENT
Start: 2020-03-09 | End: 2020-03-09

## 2020-03-09 RX ORDER — HALOPERIDOL DECANOATE 100 MG/ML
0.5 INJECTION INTRAMUSCULAR EVERY 4 HOURS
Refills: 0 | Status: DISCONTINUED | OUTPATIENT
Start: 2020-03-09 | End: 2020-03-11

## 2020-03-09 RX ORDER — LEVOTHYROXINE SODIUM 125 MCG
87.5 TABLET ORAL AT BEDTIME
Refills: 0 | Status: DISCONTINUED | OUTPATIENT
Start: 2020-03-10 | End: 2020-03-10

## 2020-03-09 RX ORDER — LEVOTHYROXINE SODIUM 125 MCG
175 TABLET ORAL DAILY
Refills: 0 | Status: COMPLETED | OUTPATIENT
Start: 2020-03-09 | End: 2020-03-09

## 2020-03-09 RX ORDER — INSULIN LISPRO 100/ML
VIAL (ML) SUBCUTANEOUS AT BEDTIME
Refills: 0 | Status: DISCONTINUED | OUTPATIENT
Start: 2020-03-09 | End: 2020-03-27

## 2020-03-09 RX ORDER — SODIUM CHLORIDE 9 MG/ML
500 INJECTION, SOLUTION INTRAVENOUS ONCE
Refills: 0 | Status: COMPLETED | OUTPATIENT
Start: 2020-03-09 | End: 2020-03-09

## 2020-03-09 RX ORDER — ARIPIPRAZOLE 15 MG/1
2.5 TABLET ORAL DAILY
Refills: 0 | Status: DISCONTINUED | OUTPATIENT
Start: 2020-03-09 | End: 2020-03-10

## 2020-03-09 RX ADMIN — Medication 2: at 21:17

## 2020-03-09 RX ADMIN — Medication 6: at 14:18

## 2020-03-09 RX ADMIN — INSULIN GLARGINE 24 UNIT(S): 100 INJECTION, SOLUTION SUBCUTANEOUS at 06:55

## 2020-03-09 RX ADMIN — SIMVASTATIN 20 MILLIGRAM(S): 20 TABLET, FILM COATED ORAL at 21:18

## 2020-03-09 RX ADMIN — NYSTATIN CREAM 1 APPLICATION(S): 100000 CREAM TOPICAL at 06:50

## 2020-03-09 RX ADMIN — Medication 25 MILLIGRAM(S): at 06:49

## 2020-03-09 RX ADMIN — HALOPERIDOL DECANOATE 1 MILLIGRAM(S): 100 INJECTION INTRAMUSCULAR at 14:42

## 2020-03-09 RX ADMIN — LOSARTAN POTASSIUM 25 MILLIGRAM(S): 100 TABLET, FILM COATED ORAL at 06:49

## 2020-03-09 RX ADMIN — Medication 10 UNIT(S): at 21:16

## 2020-03-09 RX ADMIN — SODIUM CHLORIDE 166.67 MILLILITER(S): 9 INJECTION, SOLUTION INTRAVENOUS at 12:37

## 2020-03-09 RX ADMIN — NYSTATIN CREAM 1 APPLICATION(S): 100000 CREAM TOPICAL at 18:31

## 2020-03-09 RX ADMIN — HEPARIN SODIUM 5000 UNIT(S): 5000 INJECTION INTRAVENOUS; SUBCUTANEOUS at 21:19

## 2020-03-09 RX ADMIN — HEPARIN SODIUM 5000 UNIT(S): 5000 INJECTION INTRAVENOUS; SUBCUTANEOUS at 06:48

## 2020-03-09 RX ADMIN — Medication 15 MILLIGRAM(S): at 08:12

## 2020-03-09 RX ADMIN — Medication 85 MILLIMOLE(S): at 18:28

## 2020-03-09 RX ADMIN — Medication 10 UNIT(S): at 14:17

## 2020-03-09 RX ADMIN — Medication 25 MILLIGRAM(S): at 18:29

## 2020-03-09 RX ADMIN — ARIPIPRAZOLE 2.5 MILLIGRAM(S): 15 TABLET ORAL at 18:30

## 2020-03-09 RX ADMIN — HALOPERIDOL DECANOATE 0.5 MILLIGRAM(S): 100 INJECTION INTRAMUSCULAR at 12:27

## 2020-03-09 RX ADMIN — Medication 175 MICROGRAM(S): at 08:11

## 2020-03-09 RX ADMIN — HEPARIN SODIUM 5000 UNIT(S): 5000 INJECTION INTRAVENOUS; SUBCUTANEOUS at 14:19

## 2020-03-09 NOTE — PROGRESS NOTE ADULT - PROBLEM SELECTOR PLAN 1
Likely underlying rapidly progressive dementia, hospital delirium.   -period of catatonic state which improved with ativan, continues on ativan  -per neuro and psych, c/w Ativan 0.5mg TID, son agreeable.   -psych team discussed abilify with son, Norris, he agrees. cw 2.5mg abilify qd  -haldol 0.5mg IV PRN agitation.   -Hypothyroidism possible etiology; , free T4 0.3. Endocrine following. Now on PO 175mcg synthroid qd. Patient continues to refuse medications. Preferring PO medications today.   -MRI brain 3mm right sided SDH no neurosurgical intervention   -LP 2/6 CSF studies wnl.  -repeat PT evaluation; pt refusing

## 2020-03-09 NOTE — PROGRESS NOTE ADULT - PROBLEM SELECTOR PLAN 2
#DKA  AG closed yesterday, BHB <2. Restarted PO diet last night.   - trending BHB, BMP, VBG   -moderate ISS  -the above plan has been difficult to perform due to patient's agitation.   -lantus 24U qdaily  -endocrine team following.  -10 units premeal

## 2020-03-09 NOTE — PROGRESS NOTE ADULT - SUBJECTIVE AND OBJECTIVE BOX
Parminder Cavanaugh, pgy 1  [facility] CMB  [phone]  After 7pm; page night float    Patient is a 64y old  Female who presents with a chief complaint of change in mental status (08 Mar 2020 11:38)      SUBJECTIVE / OVERNIGHT EVENTS: Refused to take IV synthroid/steroid today. Preferred PO.   ADDITIONAL REVIEW OF SYSTEMS:    MEDICATIONS  (STANDING):  ARIPiprazole 2.5 milliGRAM(s) Oral daily  budesonide  80 MICROgram(s)/formoterol 4.5 MICROgram(s) Inhaler 2 Puff(s) Inhalation two times a day  dextrose 5%. 1000 milliLiter(s) (50 mL/Hr) IV Continuous <Continuous>  dextrose 50% Injectable 12.5 Gram(s) IV Push once  dextrose 50% Injectable 25 Gram(s) IV Push once  dextrose 50% Injectable 25 Gram(s) IV Push once  heparin  Injectable 5000 Unit(s) SubCutaneous every 8 hours  influenza   Vaccine 0.5 milliLiter(s) IntraMuscular once  insulin glargine Injectable (LANTUS) 24 Unit(s) SubCutaneous <User Schedule>  insulin lispro (HumaLOG) corrective regimen sliding scale   SubCutaneous three times a day before meals  insulin lispro Injectable (HumaLOG) 10 Unit(s) SubCutaneous three times a day with meals  levothyroxine 175 MICROGram(s) Oral daily  levothyroxine Injectable 87.5 MICROGram(s) IV Push once  LORazepam     Tablet 0.5 milliGRAM(s) Oral three times a day  losartan 25 milliGRAM(s) Oral daily  methylPREDNISolone sodium succinate Injectable 12.5 milliGRAM(s) IV Push daily  metoprolol tartrate 25 milliGRAM(s) Oral two times a day  multivitamin/minerals/iron Oral Solution (CENTRUM) 15 milliLiter(s) Oral daily  nystatin Powder 1 Application(s) Topical every 12 hours  pantoprazole    Tablet 40 milliGRAM(s) Oral <User Schedule>  predniSONE   Tablet 15 milliGRAM(s) Oral once  primidone 50 milliGRAM(s) Oral at bedtime  simvastatin 20 milliGRAM(s) Oral at bedtime  thiamine 100 milliGRAM(s) Oral daily    MEDICATIONS  (PRN):  acetaminophen   Tablet .. 975 milliGRAM(s) Oral every 6 hours PRN Moderate Pain (4 - 6), Severe Pain (7 - 10)  acetaminophen  Suppository .. 650 milliGRAM(s) Rectal every 6 hours PRN Temp greater or equal to 38C (100.4F)  albuterol/ipratropium for Nebulization 3 milliLiter(s) Nebulizer every 6 hours PRN Shortness of Breath and/or Wheezing  dextrose 40% Gel 15 Gram(s) Oral once PRN Blood Glucose LESS THAN 70 milliGRAM(s)/deciLiter  glucagon  Injectable 1 milliGRAM(s) IntraMuscular once PRN Glucose <70 milliGRAM(s)/deciLiter  haloperidol    Injectable 0.5 milliGRAM(s) IV Push every 4 hours PRN agitation  melatonin 3 milliGRAM(s) Oral at bedtime PRN Insomnia      CAPILLARY BLOOD GLUCOSE      POCT Blood Glucose.: 122 mg/dL (09 Mar 2020 06:29)  POCT Blood Glucose.: 156 mg/dL (09 Mar 2020 01:51)  POCT Blood Glucose.: 211 mg/dL (08 Mar 2020 22:30)  POCT Blood Glucose.: 193 mg/dL (08 Mar 2020 19:46)  POCT Blood Glucose.: 194 mg/dL (08 Mar 2020 18:32)  POCT Blood Glucose.: 202 mg/dL (08 Mar 2020 17:00)  POCT Blood Glucose.: 178 mg/dL (08 Mar 2020 14:17)  POCT Blood Glucose.: 163 mg/dL (08 Mar 2020 11:21)  POCT Blood Glucose.: 151 mg/dL (08 Mar 2020 09:49)  POCT Blood Glucose.: 159 mg/dL (08 Mar 2020 09:41)    I&O's Summary    08 Mar 2020 07:01  -  09 Mar 2020 07:00  --------------------------------------------------------  IN: 150 mL / OUT: 500 mL / NET: -350 mL        PHYSICAL EXAM:  Vital Signs Last 24 Hrs  T(C): 36.5 (09 Mar 2020 06:46), Max: 37.4 (08 Mar 2020 21:23)  T(F): 97.7 (09 Mar 2020 06:46), Max: 99.3 (08 Mar 2020 21:23)  HR: 100 (09 Mar 2020 06:46) (100 - 103)  BP: 148/86 (09 Mar 2020 06:46) (132/89 - 151/78)  BP(mean): --  RR: 18 (09 Mar 2020 06:46) (18 - 18)  SpO2: 94% (09 Mar 2020 06:46) (94% - 98%)    LABS:    03-08    138  |  106  |  7   ----------------------------<  206<H>  4.3   |  15<L>  |  0.78    Ca    8.6      08 Mar 2020 18:43  Phos  2.2     03-08  Mg     1.9     03-08    TPro  8.2  /  Alb  4.2  /  TBili  0.2  /  DBili  x   /  AST  7<L>  /  ALT  19  /  AlkPhos  101  03-07                RADIOLOGY & ADDITIONAL TESTS:  Results Reviewed:   Imaging Personally Reviewed:  Electrocardiogram Personally Reviewed:    COORDINATION OF CARE:  Care Discussed with Consultants/Other Providers [Y/N]:  Prior or Outpatient Records Reviewed [Y/N]: Parminder Cavanaugh, pgy 1  [facility] CMB  [phone]  After 7pm; page night float    Patient is a 64y old  Female who presents with a chief complaint of change in mental status (08 Mar 2020 11:38)      SUBJECTIVE / OVERNIGHT EVENTS: Refused to take IV synthroid/steroid today. Preferred PO. More conversive this morning, asking how my evening was. She refused physical exam at that time since she was eating breakfast, requesting that I come by later.   ADDITIONAL REVIEW OF SYSTEMS: unable to fully assess.     MEDICATIONS  (STANDING):  ARIPiprazole 2.5 milliGRAM(s) Oral daily  budesonide  80 MICROgram(s)/formoterol 4.5 MICROgram(s) Inhaler 2 Puff(s) Inhalation two times a day  dextrose 5%. 1000 milliLiter(s) (50 mL/Hr) IV Continuous <Continuous>  dextrose 50% Injectable 12.5 Gram(s) IV Push once  dextrose 50% Injectable 25 Gram(s) IV Push once  dextrose 50% Injectable 25 Gram(s) IV Push once  heparin  Injectable 5000 Unit(s) SubCutaneous every 8 hours  influenza   Vaccine 0.5 milliLiter(s) IntraMuscular once  insulin glargine Injectable (LANTUS) 24 Unit(s) SubCutaneous <User Schedule>  insulin lispro (HumaLOG) corrective regimen sliding scale   SubCutaneous three times a day before meals  insulin lispro Injectable (HumaLOG) 10 Unit(s) SubCutaneous three times a day with meals  levothyroxine 175 MICROGram(s) Oral daily  levothyroxine Injectable 87.5 MICROGram(s) IV Push once  LORazepam     Tablet 0.5 milliGRAM(s) Oral three times a day  losartan 25 milliGRAM(s) Oral daily  methylPREDNISolone sodium succinate Injectable 12.5 milliGRAM(s) IV Push daily  metoprolol tartrate 25 milliGRAM(s) Oral two times a day  multivitamin/minerals/iron Oral Solution (CENTRUM) 15 milliLiter(s) Oral daily  nystatin Powder 1 Application(s) Topical every 12 hours  pantoprazole    Tablet 40 milliGRAM(s) Oral <User Schedule>  predniSONE   Tablet 15 milliGRAM(s) Oral once  primidone 50 milliGRAM(s) Oral at bedtime  simvastatin 20 milliGRAM(s) Oral at bedtime  thiamine 100 milliGRAM(s) Oral daily    MEDICATIONS  (PRN):  acetaminophen   Tablet .. 975 milliGRAM(s) Oral every 6 hours PRN Moderate Pain (4 - 6), Severe Pain (7 - 10)  acetaminophen  Suppository .. 650 milliGRAM(s) Rectal every 6 hours PRN Temp greater or equal to 38C (100.4F)  albuterol/ipratropium for Nebulization 3 milliLiter(s) Nebulizer every 6 hours PRN Shortness of Breath and/or Wheezing  dextrose 40% Gel 15 Gram(s) Oral once PRN Blood Glucose LESS THAN 70 milliGRAM(s)/deciLiter  glucagon  Injectable 1 milliGRAM(s) IntraMuscular once PRN Glucose <70 milliGRAM(s)/deciLiter  haloperidol    Injectable 0.5 milliGRAM(s) IV Push every 4 hours PRN agitation  melatonin 3 milliGRAM(s) Oral at bedtime PRN Insomnia      CAPILLARY BLOOD GLUCOSE      POCT Blood Glucose.: 122 mg/dL (09 Mar 2020 06:29)  POCT Blood Glucose.: 156 mg/dL (09 Mar 2020 01:51)  POCT Blood Glucose.: 211 mg/dL (08 Mar 2020 22:30)  POCT Blood Glucose.: 193 mg/dL (08 Mar 2020 19:46)  POCT Blood Glucose.: 194 mg/dL (08 Mar 2020 18:32)  POCT Blood Glucose.: 202 mg/dL (08 Mar 2020 17:00)  POCT Blood Glucose.: 178 mg/dL (08 Mar 2020 14:17)  POCT Blood Glucose.: 163 mg/dL (08 Mar 2020 11:21)  POCT Blood Glucose.: 151 mg/dL (08 Mar 2020 09:49)  POCT Blood Glucose.: 159 mg/dL (08 Mar 2020 09:41)    I&O's Summary    08 Mar 2020 07:01  -  09 Mar 2020 07:00  --------------------------------------------------------  IN: 150 mL / OUT: 500 mL / NET: -350 mL        PHYSICAL EXAM:  Vital Signs Last 24 Hrs  T(C): 36.5 (09 Mar 2020 06:46), Max: 37.4 (08 Mar 2020 21:23)  T(F): 97.7 (09 Mar 2020 06:46), Max: 99.3 (08 Mar 2020 21:23)  HR: 100 (09 Mar 2020 06:46) (100 - 103)  BP: 148/86 (09 Mar 2020 06:46) (132/89 - 151/78)  BP(mean): --  RR: 18 (09 Mar 2020 06:46) (18 - 18)  SpO2: 94% (09 Mar 2020 06:46) (94% - 98%)  Unable to fully assess  NAD, non cachectic.   LABS:    03-08    138  |  106  |  7   ----------------------------<  206<H>  4.3   |  15<L>  |  0.78    Ca    8.6      08 Mar 2020 18:43  Phos  2.2     03-08  Mg     1.9     03-08    TPro  8.2  /  Alb  4.2  /  TBili  0.2  /  DBili  x   /  AST  7<L>  /  ALT  19  /  AlkPhos  101  03-07                RADIOLOGY & ADDITIONAL TESTS:  Results Reviewed:   Imaging Personally Reviewed:  Electrocardiogram Personally Reviewed:    COORDINATION OF CARE:  Care Discussed with Consultants/Other Providers [Y/N]:  Prior or Outpatient Records Reviewed [Y/N]: Parminder Cavanaugh, pgy 1  [facility]  [phone]  After 7pm; page night float    Patient is a 64y old  Female who presents with a chief complaint of change in mental status (08 Mar 2020 11:38)      SUBJECTIVE / OVERNIGHT EVENTS: Refused to take IV synthroid/steroid today. Preferred PO. More conversive this morning, asking how my evening was. She refused physical exam at that time since she was eating breakfast, requesting that I come by later.     ADDITIONAL REVIEW OF SYSTEMS: unable to fully assess secondary to mental status.    MEDICATIONS  (STANDING):  ARIPiprazole 2.5 milliGRAM(s) Oral daily  budesonide  80 MICROgram(s)/formoterol 4.5 MICROgram(s) Inhaler 2 Puff(s) Inhalation two times a day  dextrose 5%. 1000 milliLiter(s) (50 mL/Hr) IV Continuous <Continuous>  dextrose 50% Injectable 12.5 Gram(s) IV Push once  dextrose 50% Injectable 25 Gram(s) IV Push once  dextrose 50% Injectable 25 Gram(s) IV Push once  heparin  Injectable 5000 Unit(s) SubCutaneous every 8 hours  influenza   Vaccine 0.5 milliLiter(s) IntraMuscular once  insulin glargine Injectable (LANTUS) 24 Unit(s) SubCutaneous <User Schedule>  insulin lispro (HumaLOG) corrective regimen sliding scale   SubCutaneous three times a day before meals  insulin lispro Injectable (HumaLOG) 10 Unit(s) SubCutaneous three times a day with meals  levothyroxine 175 MICROGram(s) Oral daily  levothyroxine Injectable 87.5 MICROGram(s) IV Push once  LORazepam     Tablet 0.5 milliGRAM(s) Oral three times a day  losartan 25 milliGRAM(s) Oral daily  methylPREDNISolone sodium succinate Injectable 12.5 milliGRAM(s) IV Push daily  metoprolol tartrate 25 milliGRAM(s) Oral two times a day  multivitamin/minerals/iron Oral Solution (CENTRUM) 15 milliLiter(s) Oral daily  nystatin Powder 1 Application(s) Topical every 12 hours  pantoprazole    Tablet 40 milliGRAM(s) Oral <User Schedule>  predniSONE   Tablet 15 milliGRAM(s) Oral once  primidone 50 milliGRAM(s) Oral at bedtime  simvastatin 20 milliGRAM(s) Oral at bedtime  thiamine 100 milliGRAM(s) Oral daily    MEDICATIONS  (PRN):  acetaminophen   Tablet .. 975 milliGRAM(s) Oral every 6 hours PRN Moderate Pain (4 - 6), Severe Pain (7 - 10)  acetaminophen  Suppository .. 650 milliGRAM(s) Rectal every 6 hours PRN Temp greater or equal to 38C (100.4F)  albuterol/ipratropium for Nebulization 3 milliLiter(s) Nebulizer every 6 hours PRN Shortness of Breath and/or Wheezing  dextrose 40% Gel 15 Gram(s) Oral once PRN Blood Glucose LESS THAN 70 milliGRAM(s)/deciLiter  glucagon  Injectable 1 milliGRAM(s) IntraMuscular once PRN Glucose <70 milliGRAM(s)/deciLiter  haloperidol    Injectable 0.5 milliGRAM(s) IV Push every 4 hours PRN agitation  melatonin 3 milliGRAM(s) Oral at bedtime PRN Insomnia      CAPILLARY BLOOD GLUCOSE      POCT Blood Glucose.: 122 mg/dL (09 Mar 2020 06:29)  POCT Blood Glucose.: 156 mg/dL (09 Mar 2020 01:51)  POCT Blood Glucose.: 211 mg/dL (08 Mar 2020 22:30)  POCT Blood Glucose.: 193 mg/dL (08 Mar 2020 19:46)  POCT Blood Glucose.: 194 mg/dL (08 Mar 2020 18:32)  POCT Blood Glucose.: 202 mg/dL (08 Mar 2020 17:00)  POCT Blood Glucose.: 178 mg/dL (08 Mar 2020 14:17)  POCT Blood Glucose.: 163 mg/dL (08 Mar 2020 11:21)  POCT Blood Glucose.: 151 mg/dL (08 Mar 2020 09:49)  POCT Blood Glucose.: 159 mg/dL (08 Mar 2020 09:41)    I&O's Summary    08 Mar 2020 07:01  -  09 Mar 2020 07:00  --------------------------------------------------------  IN: 150 mL / OUT: 500 mL / NET: -350 mL        PHYSICAL EXAM:  Vital Signs Last 24 Hrs  T(C): 36.5 (09 Mar 2020 06:46), Max: 37.4 (08 Mar 2020 21:23)  T(F): 97.7 (09 Mar 2020 06:46), Max: 99.3 (08 Mar 2020 21:23)  HR: 100 (09 Mar 2020 06:46) (100 - 103)  BP: 148/86 (09 Mar 2020 06:46) (132/89 - 151/78)  BP(mean): --  RR: 18 (09 Mar 2020 06:46) (18 - 18)  SpO2: 94% (09 Mar 2020 06:46) (94% - 98%)    REFUSED PHYSICAL  NAD, non cachectic.   Awake.  Oriented to person and hospital       LABS:    03-08    138  |  106  |  7   ----------------------------<  206<H>  4.3   |  15<L>  |  0.78    Ca    8.6      08 Mar 2020 18:43  Phos  2.2     03-08  Mg     1.9     03-08    TPro  8.2  /  Alb  4.2  /  TBili  0.2  /  DBili  x   /  AST  7<L>  /  ALT  19  /  AlkPhos  101  03-07                RADIOLOGY & ADDITIONAL TESTS:  Results Reviewed:   Imaging Personally Reviewed:  Electrocardiogram Personally Reviewed:    COORDINATION OF CARE:  Care Discussed with Consultants/Other Providers [Y/N]:  Prior or Outpatient Records Reviewed [Y/N]:

## 2020-03-09 NOTE — PROGRESS NOTE ADULT - ASSESSMENT
64F w/ a PMHx arthritis, hypothyroidism, pulmonary HTN, DM on insulin, HTN, COPD/CHRIS on noctournal CPAP and 3L home), RA on chronic prednisone, chronic tremors, SBO s/p ex lap with lysis of adhesions c/b evisceration p/w agitation 3 weeks of unclear etiology, found to be significantly hypothyroid (), MRI brain found to have small right sided SDH, hospital course c/b catatonia improved with Ativan. 64F w/ a PMHx arthritis, hypothyroidism, pulmonary HTN, DM on insulin, HTN, COPD/CHRIS on noctournal CPAP and 3L home), RA on chronic prednisone, chronic tremors, SBO s/p ex lap with lysis of adhesions c/b evisceration p/w agitation 3 weeks of unclear etiology, found to be significantly hypothyroid (), MRI brain found to have small right sided SDH, hospital course c/b catatonia improved with Ativan. Course recently c/b DKA, no resolved with AG closed.

## 2020-03-09 NOTE — CHART NOTE - NSCHARTNOTEFT_GEN_A_CORE
Nutrition Follow Up Note  Patient seen for: malnutrition follow up  Source: EMR and Pt (Noted Pt with agitation as per chart)    As per chart, Pt is a 63 y/o F with PMHx of arthritis, hypothyroidism, pulmonary HTN, DM, HTN, COPD/CHRIS, RA on chronic prednisone, chronic tremors, SBO s/p ex lap with lysis of adhesions complicated by evisceration presents with agitation found to be significantly hypothyroid with small right sided SDH and hospital course complicated by catatonia - now resolved. Pt with agitation and frustration being followed by behavorial health. Pt with elevated blood glucose levels, being followed by Endo.     Diet: Soft, Consistent Carbohydrate    Pt reports good appetite tolerating soft consistent carb diet with >75% PO intake - stating she enjoys the meals in-house. Pt reports she is being discharged today and is awaiting to hear from the doctor. Pt denies any recent GI distress or nausea at this time with last BM 1x yesterday (03/08). Attempted to provide consistent carbohydrate education and relation to blood glucose - Pt claims she is "all good with that." Left written education at bedside.     PO intake: >75%     Source for PO intake: Pt and flowsheets    Weights: (01/24) 207.8 lbs. Msot recent weight of 198.1 lbs. (02/26). Note decreased weight trend - will continue to monitor.     Pertinent Medications: heparin, Mycostatin powder, Humalog, Lantus, Synthroid, Haldol, Solu-medrol, Protonix, Cozaar, Lopressor, multivitamin, thiamine, Symbicort, Mysoline, Zocor.  Pertinent Labs: (03/09) na 138, Glucose 206, A1C 9.2% (01/27), Phosphorus 2.2 (03/08).     Finger sticks: Note Pt on steroid medication   (03/09): 122-267  (03/08): 151-225  (03/07): 230-310  (03/06): 128-181  (03/05): 142-190    Skin per nursing documentation: no pressure injuries noted  Edema: +2 generalized as per flowsheets    Estimated Needs:   [X] no change since previous assessment  [ ] recalculated:     Previous Nutrition Diagnosis: Moderate malnutrition, altered nutrition related lab values  Nutrition Diagnosis is: malnutrition dx goal achieved - PO intake improved to >75%. altered lab values ongoing - addressed with nutrition education   Interventions:   Recommend  1. Continue diet order with consistent carbohydrate restriction as ordered, monitor/adjust as needed. Defer consistency to medical team.   2. Continue PO encouragement and assistance with meals as able.   3. Continue micronutrient supplementation as ordered, monitor/adjust as needed.   4. Reinforce education as able. Left written education at bedside.  5. RD remains available upon request and will follow up per protocol.  Domingo Johnson, Dietetic Intern  #[phone] Nutrition Follow Up Note  Patient seen for: malnutrition follow up  Source: EMR and Pt (Noted Pt with agitation as per chart)    As per chart, Pt is a 65 y/o F with PMHx of arthritis, hypothyroidism, pulmonary HTN, DM, HTN, COPD/CHRIS, RA on chronic prednisone, chronic tremors, SBO s/p ex lap with lysis of adhesions complicated by evisceration presents with agitation found to be significantly hypothyroid with small right sided SDH and hospital course complicated by catatonia - now resolved. Pt with agitation and frustration being followed by behavorial health. Pt with elevated blood glucose levels, being followed by Endo.     Diet: Soft, Consistent Carbohydrate    Pt reports good appetite tolerating soft consistent carb diet with >75% PO intake - stating she enjoys the meals in-house. Pt denies any recent GI distress or nausea at this time with last BM 1x yesterday (03/08). Attempted to provide consistent carbohydrate education and relation to blood glucose - Pt claims she is "all good with that." Left written education at bedside.     PO intake: >75%     Source for PO intake: Pt and flowsheets    Weights: (01/24) 207.8 lbs. Msot recent weight of 198.1 lbs. (02/26). Note decreased weight trend - will continue to monitor.     Pertinent Medications: heparin, Mycostatin powder, Humalog, Lantus, Synthroid, Haldol, Solu-medrol, Protonix, Cozaar, Lopressor, multivitamin, thiamine, Symbicort, Mysoline, Zocor.  Pertinent Labs: (03/09) na 138, Glucose 206, A1C 9.2% (01/27), Phosphorus 2.2 (03/08).     Finger sticks: Note Pt on steroid medication   (03/09): 122-267  (03/08): 151-225  (03/07): 230-310  (03/06): 128-181  (03/05): 142-190    Skin per nursing documentation: no pressure injuries noted  Edema: +2 generalized as per flowsheets    Estimated Needs:   [X] no change since previous assessment  [ ] recalculated:     Previous Nutrition Diagnosis: Moderate malnutrition, altered nutrition related lab values  Nutrition Diagnosis is: malnutrition dx goal achieved - PO intake improved to >75%. altered lab values ongoing - addressed with nutrition education   Interventions:   Recommend  1. Continue diet order with consistent carbohydrate restriction as ordered, monitor/adjust as needed. Defer consistency to medical team.   2. Continue PO encouragement and assistance with meals as able.   3. Continue micronutrient supplementation as ordered, monitor/adjust as needed.   4. Reinforce education as able. Left written education at bedside.  5. RD remains available upon request and will follow up per protocol.  Domingo Johnson, Dietetic Intern  #[phone] Nutrition Follow Up Note  Patient seen for: malnutrition follow up  Source: EMR and Pt (Noted Pt with agitation as per chart)    As per chart, Pt is a 63 y/o F with PMHx of arthritis, hypothyroidism, pulmonary HTN, DM, HTN, COPD/CHRIS, RA on chronic prednisone, chronic tremors, SBO s/p ex lap with lysis of adhesions complicated by evisceration presents with agitation found to be significantly hypothyroid with small right sided SDH and hospital course complicated by catatonia - now resolved. Pt with agitation and frustration being followed by behavorial health. Pt with elevated blood glucose levels, being followed by Endo.     Diet: Soft, Consistent Carbohydrate    Pt reports good appetite tolerating soft consistent carb diet with >75% PO intake - stating she enjoys the meals in-house. Pt denies any recent GI distress or nausea at this time with last BM 1x yesterday (03/08). Attempted to provide consistent carbohydrate education and relation to blood glucose - Pt claims she is "all good with that." Left written education at bedside.     PO intake: >75%     Source for PO intake: Pt and flowsheets    Weights: (01/24) 207.8 lbs. Msot recent weight of 198.1 lbs. (02/26). Note decreased weight trend - will continue to monitor.     Pertinent Medications: heparin, Mycostatin powder, Humalog, Lantus, Synthroid, Haldol, Solu-medrol, Protonix, Cozaar, Lopressor, multivitamin, thiamine, Symbicort, Mysoline, Zocor.  Pertinent Labs: (03/09) na 138, Glucose 206, A1C 9.2% (01/27), Phosphorus 2.2 (03/08).     Finger sticks: Note Pt on steroid medication   (03/09): 122-267  (03/08): 151-225  (03/07): 230-310  (03/06): 128-181  (03/05): 142-190    Skin per nursing documentation: no pressure injuries noted  Edema: +2 generalized as per flowsheets    Estimated Needs:   [X] no change since previous assessment  [ ] recalculated:     Previous Nutrition Diagnosis: Moderate malnutrition, altered nutrition related lab values  Nutrition Diagnosis is: malnutrition dx goal improving - PO intake improved to >75%. altered lab values ongoing - addressed with nutrition education     Interventions:   Recommend  1. Continue diet order with consistent carbohydrate restriction as ordered, monitor/adjust as needed. Defer consistency to medical team.   2. Continue PO encouragement and assistance with meals as able.   3. Continue micronutrient supplementation as ordered, monitor/adjust as needed.   4. Reinforce education as able. Left written education at bedside.  5. RD remains available upon request and will follow up per protocol.  Domingo Johnson, Dietetic Intern  #[phone]

## 2020-03-09 NOTE — PROGRESS NOTE ADULT - PROBLEM SELECTOR PLAN 1
-test BG AC/HS  -C/w Lantus 24 units QAM.  -C/w humalog 10 units premeal   -C/w humalog mod dose sliding scale ac meals.     -Restart Humalog moderate correction scale at hs  Discharge plan: basal/bolus  -Even though pt is alert and talking, she still unable to care for her DM. Per primary team plan to discharge to rehab when more stable  -f/u outpt w/Dr Saul. Will make apt closer to discharge    -Plan discussed with pt's team and RN.

## 2020-03-09 NOTE — PROGRESS NOTE ADULT - SUBJECTIVE AND OBJECTIVE BOX
DIABETES FOLLOW UP NOTE: Saw pt earlier today  INTERVAL HX:63 y/o F w/h/o uncontrolled T2DM with unknown complications. Also h/o functional paraplegia, hypothyroidism, pulmonary hypertension, COPD, RA on chronic prednisone, SBO s/p ex lap with lysis of adhesions c/b multifocal pneumonia with AMS of unknown etiology. Pt was on catatonic state that resolved with ativan administration and is now more alert but still confused and refusing meds/treatments on and off. Noted that pt had mild DKA over the weekend that is now resolved. Glycemic control variable due to variable PO intake and refusing insulin on and off. Took Lantus but not Humalog even though pt ate breakfast with rebound hyperglycemia ac lunch.  Synthroid/steroids back via PO. Pt took them this am but per RN it is very difficult to convince pt to take meds. No hypoglycemia. Pt states nurses are bad and are not giving her meds> however staff reports the opposite.    Review of Systems:  General: As above  Cardiovascular: No chest pain, palpitations  Respiratory: No SOB, no cough  GI: No nausea, vomiting, abdominal pain  Endocrine: no polyuria, polydipsia or S&Sx of hypoglycemia    Allergies    Depakote (Other)  Seroquel (Other (Severe))    Intolerances      MEDICATIONS:  insulin glargine Injectable (LANTUS) 24 Unit(s) SubCutaneous <User Schedule>  insulin lispro (HumaLOG) corrective regimen sliding scale   SubCutaneous three times a day before meals  insulin lispro Injectable (HumaLOG) 10 Unit(s) SubCutaneous three times a day with meals  methylPREDNISolone sodium succinate Injectable 12.5 milliGRAM(s) IV Push daily. Received Prednisone 15mg this am  simvastatin 20 milliGRAM(s) Oral at bedtime        PHYSICAL EXAM:  VITALS: T(C): 36.5 (03-09-20 @ 06:46)  T(F): 97.7 (03-09-20 @ 06:46), Max: 99.3 (03-08-20 @ 21:23)  HR: 100 (03-09-20 @ 06:46) (100 - 103)  BP: 148/86 (03-09-20 @ 06:46) (132/89 - 148/86)  RR:  (18 - 18)  SpO2:  (94% - 94%)  Wt(kg): --  GENERAL: Female sitting in chair in NAD  Abdomen: Soft, nontender, non distended, obese. Surgical dressing D&I  Extremities: Warm, no edema in all 4 exts  NEURO: Alert, able to answer simple questions but gets easily confused  Psych: Appears mistrustful and suspicious toward medical staff.       LABS:  POCT Blood Glucose.: 267 mg/dL (03-09-20 @ 14:16)  POCT Blood Glucose.: 122 mg/dL (03-09-20 @ 06:29)  POCT Blood Glucose.: 156 mg/dL (03-09-20 @ 01:51)  POCT Blood Glucose.: 211 mg/dL (03-08-20 @ 22:30)  POCT Blood Glucose.: 193 mg/dL (03-08-20 @ 19:46)  POCT Blood Glucose.: 194 mg/dL (03-08-20 @ 18:32)  POCT Blood Glucose.: 202 mg/dL (03-08-20 @ 17:00)  POCT Blood Glucose.: 178 mg/dL (03-08-20 @ 14:17)  POCT Blood Glucose.: 163 mg/dL (03-08-20 @ 11:21)  POCT Blood Glucose.: 151 mg/dL (03-08-20 @ 09:49)  POCT Blood Glucose.: 159 mg/dL (03-08-20 @ 09:41)  POCT Blood Glucose.: 183 mg/dL (03-08-20 @ 06:41)  POCT Blood Glucose.: 225 mg/dL (03-08-20 @ 01:29)  POCT Blood Glucose.: 230 mg/dL (03-07-20 @ 21:22)  POCT Blood Glucose.: 286 mg/dL (03-07-20 @ 17:17)  POCT Blood Glucose.: 310 mg/dL (03-07-20 @ 13:07)  POCT Blood Glucose.: 280 mg/dL (03-07-20 @ 08:29)  POCT Blood Glucose.: 306 mg/dL (03-07-20 @ 05:22)  POCT Blood Glucose.: 180 mg/dL (03-06-20 @ 22:48)  POCT Blood Glucose.: 181 mg/dL (03-06-20 @ 17:40)                            9.6    10.80 )-----------( 385      ( 09 Mar 2020 16:24 )             31.0       03-09    137  |  105  |  11  ----------------------------<  278<H>  3.9   |  16<L>  |  1.01    EGFR if : 69  EGFR if non : 60    Ca    9.0      03-09  Mg     1.7     03-09  Phos  1.4     03-09    TPro  8.2  /  Alb  4.2  /  TBili  0.2  /  DBili  x   /  AST  7<L>  /  ALT  19  /  AlkPhos  101  03-07      Thyroid Function Tests:  02-23 @ 13:25 TSH -- FreeT4 -- T3 53 Anti TPO -- Anti Thyroglobulin Ab -- TSI --  02-13 @ 09:05 TSH 17.80 FreeT4 -- T3 -- Anti TPO -- Anti Thyroglobulin Ab -- TSI --      Hemoglobin A1C, Whole Blood: 9.0 % <H> [4.0 - 5.6] (01-28-20 @ 19:30)  Hemoglobin A1C, Whole Blood: 9.2 % <H> [4.0 - 5.6] (01-27-20 @ 17:36)

## 2020-03-09 NOTE — PROGRESS NOTE ADULT - ATTENDING COMMENTS
Patient seen and examined today. I agree with the above findings, assessment, and plan with the following additions and exceptions:     Acute encephalopathy, unspecified. Improved near baseline per family.   Likely multifactorial. Patient with severe hypothyroidism, stable SDH on imaging, recently treated UTI, adverse reaction to VPA, and now mild DKA. Noted to have signs of AI at outside hospital. On chronic steroids for RA. Anterior abd wound does not show signs of infection. CSF autoimmune and paraneoplastic panels negative.   Pending labs for DKA management.   Psych recommending abilify standing and haldol prn. Assistance appreciated.   Endo recs appreciated for hypothyroidism and dm.   HSQ ordered on 3/7. No changes in mental status.  Patient intermittently refusing taking medications, blood draws, and physical exam.  Patient noncompliance with medical management puts her at elevated risk fo readmission to the hospital.   Rest of plan as above.    Dr. Avery Dugan DO  Attending Physician  Division of Hospital Medicine  [facility]  Pager:  [phone] .

## 2020-03-09 NOTE — PROGRESS NOTE BEHAVIORAL HEALTH - NSBHCONSULTFOLLOWAFTERCARE_PSY_A_CORE FT
as per primary team, pt has 24-7 care at home. OP psych f/u at [facility]- [phone]  [facility] clinic- [phone]

## 2020-03-09 NOTE — PROGRESS NOTE ADULT - PROBLEM SELECTOR PLAN 2
-Pt currently on PO Synthroid    -C/w multivitamins and Protonix administratio at 12 noon instead of am  -C/w Synthroid 175 mcg PO daily. If pt not taking med by mouth can administer Synthroid 87.5 mcg IVP instead  -Make sure PO med is given in an empty stomach  -Re test TFT a week to reassess any changes on levels.   -Plan discussed with pt/team.  Contact info: [phone] (24/7). pager [phone]

## 2020-03-09 NOTE — PROGRESS NOTE BEHAVIORAL HEALTH - NSBHCONSULTRECOMMENDOTHER_PSY_A_CORE FT
1) continue ativan 0.5 mg po TID for anxiety/irritable mood, had presentation of catatonia 7-10 days ago and responded well to ativan    2) it should be noted, that due to pt's vascular lesions (hx of subacute stroke/SDH), pt may be at a new cognitive baseline, rule out dementia process. her mood lability symptoms have improved since her admission.     3) due to agitation and mood lability symptoms, continue Abilify. Norris, pt's son and HCP had given consent to give abilify 2.5 mg qdaily, with possible plan to raise to 5 mg if needed for mood instability. Please ensure QTc < 500 if increasing the dose.    4) for acute agitation, consider haldol 0.5 mg IV Q4hr PRN, f/u QTc < 500. if 0.5 mg ineffective, consider 1 mg IV Q4hr instead. Norris also aware of this recommendation and gives consent    5) no indication for inpt psychiatric admission    6) pt has 24-7 care at home, this would be suitable discharge plan. if family is hesitant about this, LTC options can be discussed, such as nursing home setting 1) continue ativan 0.5 mg po TID for anxiety/irritable mood, had presentation of catatonia 7-10 days ago and responded well to ativan    2) it should be noted, that due to pt's vascular lesions (hx of subacute stroke/SDH), pt may be at a new cognitive baseline, rule out dementia process. her mood lability symptoms have improved since her admission.     3) due to agitation and mood lability symptoms, continue Abilify. Norris, pt's son and HCP had given consent to give abilify 2.5 mg qdaily, with possible plan to raise to 5 mg if needed for mood instability. Please ensure QTc < 500 if increasing the dose.    4) for acute agitation, consider haldol 0.5 mg IV Q4hr PRN, f/u QTc < 500. if 0.5 mg ineffective, consider 1 mg IV Q4hr instead. Norris also aware of this recommendation and gives consent    5) no indication for inpt psychiatric admission

## 2020-03-09 NOTE — PROGRESS NOTE ADULT - PROBLEM SELECTOR PLAN 4
-T4 wnl  -T3 LOW 53  -now on IV synthyroid 87.5 due to refusal to take PO; however requested PO today.   -outpatient repeat tfts, apprecaite endo recs

## 2020-03-09 NOTE — PROGRESS NOTE ADULT - ASSESSMENT
65 y/o F w/h/o uncontrolled T2DM with unknown complications. Also h/o functional paraplegia, hypothyroidism, pulmonary hypertension, COPD, RA on chronic prednisone, SBO s/p ex lap with lysis of adhesions c/b multifocal pneumonia with AMS of unknown etiology. Pt was on catatonic state that resolved with ativan administration and is now more alert but still confused and refusing meds/treatments on and off. Mild DKA over the weekend that is now resolved. Glycemic control remains variable due to variable PO intake and pt refusing insulin on and off. Took Lantus but not Humalog even though pt ate breakfast with rebound hyperglycemia ac lunch. Synthroid/steroids back via PO. Pt took them this am but per RN it is very difficult to convince pt to take meds. No hypoglycemia. Will continue present insulin regimen since there is not a glycemic pattern to adjust insulin doses further. Spoke to RN to try to administer insulin as ordered to prevent hypo/hyperglycemia.   Spent 25 minutes assessing pt/labs/meds and discussing plan of care with primary team/staff

## 2020-03-10 LAB
ALBUMIN SERPL ELPH-MCNC: 3.5 G/DL — SIGNIFICANT CHANGE UP (ref 3.3–5)
ALP SERPL-CCNC: 90 U/L — SIGNIFICANT CHANGE UP (ref 40–120)
ALT FLD-CCNC: 17 U/L — SIGNIFICANT CHANGE UP (ref 10–45)
ANION GAP SERPL CALC-SCNC: 18 MMOL/L — HIGH (ref 5–17)
AST SERPL-CCNC: 15 U/L — SIGNIFICANT CHANGE UP (ref 10–40)
B-OH-BUTYR SERPL-SCNC: 0.4 MMOL/L — SIGNIFICANT CHANGE UP
BILIRUB SERPL-MCNC: 0.1 MG/DL — LOW (ref 0.2–1.2)
BUN SERPL-MCNC: 10 MG/DL — SIGNIFICANT CHANGE UP (ref 7–23)
CALCIUM SERPL-MCNC: 8.9 MG/DL — SIGNIFICANT CHANGE UP (ref 8.4–10.5)
CHLORIDE SERPL-SCNC: 106 MMOL/L — SIGNIFICANT CHANGE UP (ref 96–108)
CK SERPL-CCNC: 44 U/L — SIGNIFICANT CHANGE UP (ref 25–170)
CO2 SERPL-SCNC: 16 MMOL/L — LOW (ref 22–31)
CREAT SERPL-MCNC: 0.94 MG/DL — SIGNIFICANT CHANGE UP (ref 0.5–1.3)
CULTURE RESULTS: SIGNIFICANT CHANGE UP
CULTURE RESULTS: SIGNIFICANT CHANGE UP
GLUCOSE BLDC GLUCOMTR-MCNC: 140 MG/DL — HIGH (ref 70–99)
GLUCOSE BLDC GLUCOMTR-MCNC: 182 MG/DL — HIGH (ref 70–99)
GLUCOSE BLDC GLUCOMTR-MCNC: 207 MG/DL — HIGH (ref 70–99)
GLUCOSE SERPL-MCNC: 191 MG/DL — HIGH (ref 70–99)
POTASSIUM SERPL-MCNC: 4.3 MMOL/L — SIGNIFICANT CHANGE UP (ref 3.5–5.3)
POTASSIUM SERPL-SCNC: 4.3 MMOL/L — SIGNIFICANT CHANGE UP (ref 3.5–5.3)
PROT SERPL-MCNC: 6.8 G/DL — SIGNIFICANT CHANGE UP (ref 6–8.3)
SODIUM SERPL-SCNC: 140 MMOL/L — SIGNIFICANT CHANGE UP (ref 135–145)
SPECIMEN SOURCE: SIGNIFICANT CHANGE UP
SPECIMEN SOURCE: SIGNIFICANT CHANGE UP
TROPONIN T, HIGH SENSITIVITY RESULT: 12 NG/L — SIGNIFICANT CHANGE UP (ref 0–51)

## 2020-03-10 PROCEDURE — 93010 ELECTROCARDIOGRAM REPORT: CPT

## 2020-03-10 PROCEDURE — 99232 SBSQ HOSP IP/OBS MODERATE 35: CPT

## 2020-03-10 PROCEDURE — 99233 SBSQ HOSP IP/OBS HIGH 50: CPT | Mod: GC

## 2020-03-10 RX ORDER — ARIPIPRAZOLE 15 MG/1
5 TABLET ORAL DAILY
Refills: 0 | Status: DISCONTINUED | OUTPATIENT
Start: 2020-03-10 | End: 2020-03-16

## 2020-03-10 RX ORDER — LEVOTHYROXINE SODIUM 125 MCG
175 TABLET ORAL DAILY
Refills: 0 | Status: DISCONTINUED | OUTPATIENT
Start: 2020-03-10 | End: 2020-03-23

## 2020-03-10 RX ORDER — HALOPERIDOL DECANOATE 100 MG/ML
1 INJECTION INTRAMUSCULAR ONCE
Refills: 0 | Status: COMPLETED | OUTPATIENT
Start: 2020-03-10 | End: 2020-03-10

## 2020-03-10 RX ORDER — TRAMADOL HYDROCHLORIDE 50 MG/1
25 TABLET ORAL ONCE
Refills: 0 | Status: DISCONTINUED | OUTPATIENT
Start: 2020-03-10 | End: 2020-03-10

## 2020-03-10 RX ADMIN — LOSARTAN POTASSIUM 25 MILLIGRAM(S): 100 TABLET, FILM COATED ORAL at 06:37

## 2020-03-10 RX ADMIN — BUDESONIDE AND FORMOTEROL FUMARATE DIHYDRATE 2 PUFF(S): 160; 4.5 AEROSOL RESPIRATORY (INHALATION) at 05:37

## 2020-03-10 RX ADMIN — Medication 10 UNIT(S): at 09:05

## 2020-03-10 RX ADMIN — Medication 4: at 12:51

## 2020-03-10 RX ADMIN — Medication 15 MILLIGRAM(S): at 21:18

## 2020-03-10 RX ADMIN — INSULIN GLARGINE 24 UNIT(S): 100 INJECTION, SOLUTION SUBCUTANEOUS at 06:37

## 2020-03-10 RX ADMIN — BUDESONIDE AND FORMOTEROL FUMARATE DIHYDRATE 2 PUFF(S): 160; 4.5 AEROSOL RESPIRATORY (INHALATION) at 16:39

## 2020-03-10 RX ADMIN — Medication 10 UNIT(S): at 18:44

## 2020-03-10 RX ADMIN — Medication 25 MILLIGRAM(S): at 16:37

## 2020-03-10 RX ADMIN — HEPARIN SODIUM 5000 UNIT(S): 5000 INJECTION INTRAVENOUS; SUBCUTANEOUS at 06:36

## 2020-03-10 RX ADMIN — TRAMADOL HYDROCHLORIDE 25 MILLIGRAM(S): 50 TABLET ORAL at 19:12

## 2020-03-10 RX ADMIN — NYSTATIN CREAM 1 APPLICATION(S): 100000 CREAM TOPICAL at 06:38

## 2020-03-10 RX ADMIN — Medication 12.5 MILLIGRAM(S): at 06:35

## 2020-03-10 RX ADMIN — HALOPERIDOL DECANOATE 1 MILLIGRAM(S): 100 INJECTION INTRAMUSCULAR at 11:34

## 2020-03-10 RX ADMIN — Medication 10 UNIT(S): at 12:51

## 2020-03-10 RX ADMIN — HEPARIN SODIUM 5000 UNIT(S): 5000 INJECTION INTRAVENOUS; SUBCUTANEOUS at 12:54

## 2020-03-10 RX ADMIN — TRAMADOL HYDROCHLORIDE 25 MILLIGRAM(S): 50 TABLET ORAL at 16:37

## 2020-03-10 RX ADMIN — PRIMIDONE 50 MILLIGRAM(S): 250 TABLET ORAL at 23:17

## 2020-03-10 RX ADMIN — ARIPIPRAZOLE 5 MILLIGRAM(S): 15 TABLET ORAL at 11:34

## 2020-03-10 RX ADMIN — Medication 3 MILLILITER(S): at 11:22

## 2020-03-10 RX ADMIN — Medication 25 MILLIGRAM(S): at 06:36

## 2020-03-10 RX ADMIN — SIMVASTATIN 20 MILLIGRAM(S): 20 TABLET, FILM COATED ORAL at 23:17

## 2020-03-10 RX ADMIN — HEPARIN SODIUM 5000 UNIT(S): 5000 INJECTION INTRAVENOUS; SUBCUTANEOUS at 23:17

## 2020-03-10 RX ADMIN — Medication 2: at 18:45

## 2020-03-10 NOTE — PROGRESS NOTE ADULT - SUBJECTIVE AND OBJECTIVE BOX
DIABETES FOLLOW UP NOTE: Saw pt earlier today  INTERVAL HX: 65 y/o F w/h/o uncontrolled T2DM with unknown complications. Also h/o functional paraplegia, hypothyroidism, pulmonary hypertension, COPD, RA on chronic prednisone, SBO s/p ex lap with lysis of adhesions c/b multifocal pneumonia with AMS of unknown etiology. Pt was on catatonic state that resolved with ativan administration and is now more alert but still confused and refusing meds/treatments on and off. Mild DKA over the weekend. Glycemic control remains variable due to variable PO intake and variable insulin administration. Per RN pt eating and had meds this am including insulin and Prednisone but didn't take synthroid.  Pt states she didn't get any meds or food since last night. No hypoglycemia.       Review of Systems:  General: As above  Cardiovascular: No chest pain, palpitations  Respiratory: No SOB, no cough  GI: No nausea, vomiting, abdominal pain  Endocrine: no polyuria, polydipsia or S&Sx of hypoglycemia    Allergies    Depakote (Other)  Seroquel (Other (Severe))    Intolerances      MEDICATIONS    insulin glargine Injectable (LANTUS) 24 Unit(s) SubCutaneous <User Schedule>  insulin lispro (HumaLOG) corrective regimen sliding scale   SubCutaneous three times a day before meals  insulin lispro (HumaLOG) corrective regimen sliding scale   SubCutaneous at bedtime  insulin lispro Injectable (HumaLOG) 10 Unit(s) SubCutaneous three times a day with meals  levothyroxine 175 MICROGram(s) Oral daily  predniSONE   Tablet 15 milliGRAM(s) Oral daily  simvastatin 20 milliGRAM(s) Oral at bedtime        PHYSICAL EXAM:  VITALS: T(C): 36.9 (03-10-20 @ 13:51)  T(F): 98.4 (03-10-20 @ 13:51), Max: 98.9 (03-10-20 @ 04:26)  HR: 102 (03-10-20 @ 16:08) (89 - 102)  BP: 157/95 (03-10-20 @ 16:08) (121/71 - 157/95)  RR:  (18 - 20)  SpO2:  (94% - 96%)  Wt(kg): --  GENERAL: Female sitting in chair in NAD  Abdomen: Soft, nontender, non distended, obese with abdominal dressing D&I   Extremities: Warm, no edema in all 4 exts  NEURO: Alert but get easily disoriented and confused. Mistrustful toward staff.      LABS:  POCT Blood Glucose.: 207 mg/dL (03-10-20 @ 12:10)  POCT Blood Glucose.: 140 mg/dL (03-10-20 @ 09:05)  POCT Blood Glucose.: 205 mg/dL (03-09-20 @ 22:34)  POCT Blood Glucose.: 151 mg/dL (03-09-20 @ 20:25)  POCT Blood Glucose.: 267 mg/dL (03-09-20 @ 14:16)  POCT Blood Glucose.: 122 mg/dL (03-09-20 @ 06:29)  POCT Blood Glucose.: 156 mg/dL (03-09-20 @ 01:51)  POCT Blood Glucose.: 211 mg/dL (03-08-20 @ 22:30)  POCT Blood Glucose.: 193 mg/dL (03-08-20 @ 19:46)  POCT Blood Glucose.: 194 mg/dL (03-08-20 @ 18:32)  POCT Blood Glucose.: 202 mg/dL (03-08-20 @ 17:00)  POCT Blood Glucose.: 178 mg/dL (03-08-20 @ 14:17)  POCT Blood Glucose.: 163 mg/dL (03-08-20 @ 11:21)  POCT Blood Glucose.: 151 mg/dL (03-08-20 @ 09:49)  POCT Blood Glucose.: 159 mg/dL (03-08-20 @ 09:41)  POCT Blood Glucose.: 183 mg/dL (03-08-20 @ 06:41)  POCT Blood Glucose.: 225 mg/dL (03-08-20 @ 01:29)                            9.6    10.80 )-----------( 385      ( 09 Mar 2020 16:24 )             31.0       03-09    137  |  105  |  11  ----------------------------<  278<H>  3.9   |  16<L>  |  1.01    EGFR if : 69  EGFR if non : 60    Ca    9.0      03-09  Mg     1.7     03-09  Phos  1.4     03-09        Thyroid Function Tests:  02-23 @ 13:25 TSH -- FreeT4 -- T3 53 Anti TPO -- Anti Thyroglobulin Ab -- TSI --  02-13 @ 09:05 TSH 17.80 FreeT4 -- T3 -- Anti TPO -- Anti Thyroglobulin Ab -- TSI --      Hemoglobin A1C, Whole Blood: 9.0 % <H> [4.0 - 5.6] (01-28-20 @ 19:30)  Hemoglobin A1C, Whole Blood: 9.2 % <H> [4.0 - 5.6] (01-27-20 @ 17:36)

## 2020-03-10 NOTE — PROGRESS NOTE ADULT - PROBLEM SELECTOR PLAN 2
#DKA  AG closed yesterday, BHB <2. Restarted PO  -moderate ISS  -lantus 24U qdaily  -10 units premeal  -endocrine team following. #DKA  AG 16 yesterday, BHB <2. Restarted PO  -moderate ISS  -lantus 24U qdaily  -10 units premeal  -endocrine team following.

## 2020-03-10 NOTE — PROGRESS NOTE ADULT - PROBLEM SELECTOR PLAN 2
-Pt currently on PO Synthroid. Didn't take it today  -Please administer Synthroid 87.5 mcg IVP today and whenever pt refuses PO dose   -C/w multivitamins and Protonix administratio at 12 noon instead of am  -Re test TFT in a week to reassess any changes on levels.   -Plan discussed with pt's team.  Contact info: [phone] (24/7). pager [phone]

## 2020-03-10 NOTE — PROGRESS NOTE ADULT - ATTENDING COMMENTS
Patient seen and examined today. I agree with the above findings, assessment, and plan with the following additions and exceptions:     Acute encephalopathy, unspecified. Improved near baseline per family.   Likely multifactorial. Patient with severe hypothyroidism, stable SDH on imaging, recently treated UTI, adverse reaction to VPA, and now mild DKA. Noted to have signs of AI at outside hospital. On chronic steroids for RA. Anterior abd wound does not show signs of infection. CSF autoimmune and paraneoplastic panels negative.   Patient intermittent refusing labs. Will attempt daily labs. May need haldol prn prior to blood draws.   Endocrine assistance appreciated. We may need more isotonic crystalloid and uptitration of insulin. If patient refusing insulin, she is not to be fed.   Psych recommending abilify standing and haldol prn. Assistance appreciated.   HSQ ordered on 3/7. No changes in mental status.  Patient reported some dyspnea this AM. Patient allowed me to do a brief exam. Course breath sounds R>L at bases. Atelectasis vs mild bronchospasm are possible. Duoneb x1. Labs pending. Will get CXR if duonebs do not help.   In order for patient to safely be discharge, she will need to demonstrate a consistent compliance with her medications. Hopefully this can be achieved with use of Abilify.   Patient noncompliance with medical management puts her at elevated risk fo readmission to the hospital.   Rest of plan as above.    Dr. Avery Dugan DO  Attending Physician  Division of Hospital Medicine  [facility]  Pager:  [phone] .

## 2020-03-10 NOTE — PROGRESS NOTE ADULT - ASSESSMENT
63 y/o F w/h/o uncontrolled T2DM with unknown complications. Also h/o functional paraplegia, hypothyroidism, pulmonary hypertension, COPD, RA on chronic prednisone, SBO s/p ex lap with lysis of adhesions c/b multifocal pneumonia with AMS of unknown etiology. Pt was on catatonic state that resolved with ativan administration and is now more alert but still confused and refusing meds/treatments on and off. Mild DKA over the weekend. Glycemic control remains variable due to variable PO intake and variable insulin administration. Per RN pt eating and had meds this am including insulin and Prednisone but didn't take synthroid. No hypoglycemia.  Will continue present insulin regimen since there is not a glycemic pattern to adjust insulin doses further. Spoke to team to order IV synthroid for today since pt didn't take it.  Per RN pulled IV access this am but was restarted again.     Spent 25 minutes assessing pt/labs/meds and discussing plan of care with primary team/staff

## 2020-03-10 NOTE — PROGRESS NOTE ADULT - PROBLEM SELECTOR PLAN 1
-test BG AC/HS  -C/w Lantus 24 units QAM.  -C/w humalog 10 units premeal   -C/w humalog mod dose sliding scale ac meals and hs.     Discharge plan: basal/bolus  -Even though pt is alert and talking, she still unable to care for her DM. Per primary team plan to discharge to rehab when more stable  -f/u outpt w/Dr Saul. Will make apt closer to discharge    -Plan discussed with pt's team and RN  Contact info: [phone] (24/7). pager [phone]

## 2020-03-10 NOTE — PROGRESS NOTE ADULT - PROBLEM SELECTOR PLAN 1
Likely underlying rapidly progressive dementia, hospital delirium.   -period of catatonic state which improved with ativan, continues on ativan  -per neuro and psych, c/w Ativan 0.5mg TID, son agreeable.   -cw 2.5mg abilify qd  -haldol 0.5mg IV PRN agitation.   -Hypothyroidism possible etiology; , free T4 0.3. Endocrine following. Now on PO 175mcg synthroid qd. Preferring PO medications today.   -MRI brain 3mm right sided SDH no neurosurgical intervention   -LP 2/6 CSF studies wnl.  -repeat PT evaluation; pt refusing Likely underlying rapidly progressive dementia, hospital delirium.   -period of catatonic state which improved with ativan, continues on ativan  -per neuro and psych, c/w Ativan 0.5mg TID, son agreeable.   -inc to 5mg abilify qd  -haldol PRN agitation.   -Hypothyroidism possible etiology; , free T4 0.3. Endocrine following. Now on PO 175mcg synthroid qd. Preferring PO medications today.   -MRI brain 3mm right sided SDH no neurosurgical intervention   -LP 2/6 CSF studies wnl.  -repeat PT evaluation; pt refusing

## 2020-03-10 NOTE — PROGRESS NOTE ADULT - ASSESSMENT
64F w/ a PMHx arthritis, hypothyroidism, pulmonary HTN, DM on insulin, HTN, COPD/CHRIS on noctournal CPAP and 3L home), RA on chronic prednisone, chronic tremors, SBO s/p ex lap with lysis of adhesions c/b evisceration p/w agitation 3 weeks of unclear etiology, found to be significantly hypothyroid (), MRI brain found to have small right sided SDH, hospital course c/b catatonia improved with Ativan. Course recently c/b DKA, nwo resolved with AG closed. 64F w/ a PMHx arthritis, hypothyroidism, pulmonary HTN, DM on insulin, HTN, COPD/CHRIS on noctournal CPAP and 3L home), RA on chronic prednisone, chronic tremors, SBO s/p ex lap with lysis of adhesions c/b evisceration p/w agitation 3 weeks of unclear etiology, found to be significantly hypothyroid (), MRI brain found to have small right sided SDH, hospital course c/b catatonia improved with Ativan. Course recently c/b DKA.

## 2020-03-11 LAB
ALBUMIN SERPL ELPH-MCNC: 3.6 G/DL — SIGNIFICANT CHANGE UP (ref 3.3–5)
ALBUMIN SERPL ELPH-MCNC: 3.8 G/DL — SIGNIFICANT CHANGE UP (ref 3.3–5)
ALP SERPL-CCNC: 84 U/L — SIGNIFICANT CHANGE UP (ref 40–120)
ALP SERPL-CCNC: 84 U/L — SIGNIFICANT CHANGE UP (ref 40–120)
ALT FLD-CCNC: 16 U/L — SIGNIFICANT CHANGE UP (ref 10–45)
ALT FLD-CCNC: 18 U/L — SIGNIFICANT CHANGE UP (ref 10–45)
ANION GAP SERPL CALC-SCNC: 15 MMOL/L — SIGNIFICANT CHANGE UP (ref 5–17)
ANION GAP SERPL CALC-SCNC: 17 MMOL/L — SIGNIFICANT CHANGE UP (ref 5–17)
AST SERPL-CCNC: 11 U/L — SIGNIFICANT CHANGE UP (ref 10–40)
AST SERPL-CCNC: 15 U/L — SIGNIFICANT CHANGE UP (ref 10–40)
B-OH-BUTYR SERPL-SCNC: 0.9 MMOL/L — HIGH
BASE EXCESS BLDV CALC-SCNC: -3.9 MMOL/L — LOW (ref -2–2)
BILIRUB SERPL-MCNC: 0.1 MG/DL — LOW (ref 0.2–1.2)
BILIRUB SERPL-MCNC: 0.2 MG/DL — SIGNIFICANT CHANGE UP (ref 0.2–1.2)
BUN SERPL-MCNC: 10 MG/DL — SIGNIFICANT CHANGE UP (ref 7–23)
BUN SERPL-MCNC: 10 MG/DL — SIGNIFICANT CHANGE UP (ref 7–23)
CA-I SERPL-SCNC: 1.21 MMOL/L — SIGNIFICANT CHANGE UP (ref 1.12–1.3)
CALCIUM SERPL-MCNC: 9.5 MG/DL — SIGNIFICANT CHANGE UP (ref 8.4–10.5)
CALCIUM SERPL-MCNC: 9.9 MG/DL — SIGNIFICANT CHANGE UP (ref 8.4–10.5)
CHLORIDE BLDV-SCNC: 112 MMOL/L — HIGH (ref 96–108)
CHLORIDE SERPL-SCNC: 104 MMOL/L — SIGNIFICANT CHANGE UP (ref 96–108)
CHLORIDE SERPL-SCNC: 107 MMOL/L — SIGNIFICANT CHANGE UP (ref 96–108)
CO2 BLDV-SCNC: 22 MMOL/L — SIGNIFICANT CHANGE UP (ref 22–30)
CO2 SERPL-SCNC: 18 MMOL/L — LOW (ref 22–31)
CO2 SERPL-SCNC: 18 MMOL/L — LOW (ref 22–31)
CREAT SERPL-MCNC: 0.82 MG/DL — SIGNIFICANT CHANGE UP (ref 0.5–1.3)
CREAT SERPL-MCNC: 0.83 MG/DL — SIGNIFICANT CHANGE UP (ref 0.5–1.3)
GAS PNL BLDV: 138 MMOL/L — SIGNIFICANT CHANGE UP (ref 135–145)
GAS PNL BLDV: SIGNIFICANT CHANGE UP
GLUCOSE BLDC GLUCOMTR-MCNC: 153 MG/DL — HIGH (ref 70–99)
GLUCOSE BLDC GLUCOMTR-MCNC: 171 MG/DL — HIGH (ref 70–99)
GLUCOSE BLDC GLUCOMTR-MCNC: 241 MG/DL — HIGH (ref 70–99)
GLUCOSE BLDV-MCNC: 237 MG/DL — HIGH (ref 70–99)
GLUCOSE SERPL-MCNC: 196 MG/DL — HIGH (ref 70–99)
GLUCOSE SERPL-MCNC: 243 MG/DL — HIGH (ref 70–99)
HCO3 BLDV-SCNC: 21 MMOL/L — SIGNIFICANT CHANGE UP (ref 21–29)
HCT VFR BLD CALC: 31.2 % — LOW (ref 34.5–45)
HCT VFR BLDA CALC: 31 % — LOW (ref 39–50)
HGB BLD CALC-MCNC: 10.1 G/DL — LOW (ref 11.5–15.5)
HGB BLD-MCNC: 9.7 G/DL — LOW (ref 11.5–15.5)
LACTATE BLDV-MCNC: 2.7 MMOL/L — HIGH (ref 0.7–2)
MCHC RBC-ENTMCNC: 29.1 PG — SIGNIFICANT CHANGE UP (ref 27–34)
MCHC RBC-ENTMCNC: 31.1 GM/DL — LOW (ref 32–36)
MCV RBC AUTO: 93.7 FL — SIGNIFICANT CHANGE UP (ref 80–100)
NRBC # BLD: 1 /100 WBCS — HIGH (ref 0–0)
OTHER CELLS CSF MANUAL: 10 ML/DL — LOW (ref 18–22)
PCO2 BLDV: 39 MMHG — SIGNIFICANT CHANGE UP (ref 35–50)
PH BLDV: 7.35 — SIGNIFICANT CHANGE UP (ref 7.35–7.45)
PLATELET # BLD AUTO: 408 K/UL — HIGH (ref 150–400)
PO2 BLDV: 39 MMHG — SIGNIFICANT CHANGE UP (ref 25–45)
POTASSIUM BLDV-SCNC: 3.8 MMOL/L — SIGNIFICANT CHANGE UP (ref 3.5–5.3)
POTASSIUM SERPL-MCNC: 4.1 MMOL/L — SIGNIFICANT CHANGE UP (ref 3.5–5.3)
POTASSIUM SERPL-MCNC: 4.6 MMOL/L — SIGNIFICANT CHANGE UP (ref 3.5–5.3)
POTASSIUM SERPL-SCNC: 4.1 MMOL/L — SIGNIFICANT CHANGE UP (ref 3.5–5.3)
POTASSIUM SERPL-SCNC: 4.6 MMOL/L — SIGNIFICANT CHANGE UP (ref 3.5–5.3)
PROT SERPL-MCNC: 7.2 G/DL — SIGNIFICANT CHANGE UP (ref 6–8.3)
PROT SERPL-MCNC: 7.3 G/DL — SIGNIFICANT CHANGE UP (ref 6–8.3)
RBC # BLD: 3.33 M/UL — LOW (ref 3.8–5.2)
RBC # FLD: 14.8 % — HIGH (ref 10.3–14.5)
SAO2 % BLDV: 68 % — SIGNIFICANT CHANGE UP (ref 67–88)
SODIUM SERPL-SCNC: 139 MMOL/L — SIGNIFICANT CHANGE UP (ref 135–145)
SODIUM SERPL-SCNC: 140 MMOL/L — SIGNIFICANT CHANGE UP (ref 135–145)
WBC # BLD: 9.86 K/UL — SIGNIFICANT CHANGE UP (ref 3.8–10.5)
WBC # FLD AUTO: 9.86 K/UL — SIGNIFICANT CHANGE UP (ref 3.8–10.5)

## 2020-03-11 PROCEDURE — 93010 ELECTROCARDIOGRAM REPORT: CPT

## 2020-03-11 PROCEDURE — 99232 SBSQ HOSP IP/OBS MODERATE 35: CPT | Mod: GC

## 2020-03-11 PROCEDURE — 99231 SBSQ HOSP IP/OBS SF/LOW 25: CPT

## 2020-03-11 RX ORDER — INSULIN LISPRO 100/ML
12 VIAL (ML) SUBCUTANEOUS
Refills: 0 | Status: DISCONTINUED | OUTPATIENT
Start: 2020-03-11 | End: 2020-03-12

## 2020-03-11 RX ORDER — HALOPERIDOL DECANOATE 100 MG/ML
1 INJECTION INTRAMUSCULAR ONCE
Refills: 0 | Status: COMPLETED | OUTPATIENT
Start: 2020-03-11 | End: 2020-03-11

## 2020-03-11 RX ORDER — SODIUM CHLORIDE 9 MG/ML
500 INJECTION INTRAMUSCULAR; INTRAVENOUS; SUBCUTANEOUS ONCE
Refills: 0 | Status: DISCONTINUED | OUTPATIENT
Start: 2020-03-11 | End: 2020-03-13

## 2020-03-11 RX ORDER — SODIUM CHLORIDE 9 MG/ML
500 INJECTION, SOLUTION INTRAVENOUS ONCE
Refills: 0 | Status: COMPLETED | OUTPATIENT
Start: 2020-03-11 | End: 2020-03-11

## 2020-03-11 RX ORDER — HALOPERIDOL DECANOATE 100 MG/ML
1 INJECTION INTRAMUSCULAR EVERY 4 HOURS
Refills: 0 | Status: DISCONTINUED | OUTPATIENT
Start: 2020-03-11 | End: 2020-03-12

## 2020-03-11 RX ADMIN — HALOPERIDOL DECANOATE 1 MILLIGRAM(S): 100 INJECTION INTRAMUSCULAR at 12:00

## 2020-03-11 RX ADMIN — Medication 175 MICROGRAM(S): at 05:44

## 2020-03-11 RX ADMIN — BUDESONIDE AND FORMOTEROL FUMARATE DIHYDRATE 2 PUFF(S): 160; 4.5 AEROSOL RESPIRATORY (INHALATION) at 18:36

## 2020-03-11 RX ADMIN — HEPARIN SODIUM 5000 UNIT(S): 5000 INJECTION INTRAVENOUS; SUBCUTANEOUS at 14:10

## 2020-03-11 RX ADMIN — Medication 4: at 08:54

## 2020-03-11 RX ADMIN — ARIPIPRAZOLE 5 MILLIGRAM(S): 15 TABLET ORAL at 17:44

## 2020-03-11 RX ADMIN — HALOPERIDOL DECANOATE 1 MILLIGRAM(S): 100 INJECTION INTRAMUSCULAR at 10:21

## 2020-03-11 RX ADMIN — NYSTATIN CREAM 1 APPLICATION(S): 100000 CREAM TOPICAL at 16:48

## 2020-03-11 RX ADMIN — BUDESONIDE AND FORMOTEROL FUMARATE DIHYDRATE 2 PUFF(S): 160; 4.5 AEROSOL RESPIRATORY (INHALATION) at 05:15

## 2020-03-11 RX ADMIN — Medication 25 MILLIGRAM(S): at 05:44

## 2020-03-11 RX ADMIN — HALOPERIDOL DECANOATE 1 MILLIGRAM(S): 100 INJECTION INTRAMUSCULAR at 22:01

## 2020-03-11 RX ADMIN — Medication 25 MILLIGRAM(S): at 17:45

## 2020-03-11 RX ADMIN — Medication 10 UNIT(S): at 08:53

## 2020-03-11 RX ADMIN — SODIUM CHLORIDE 500 MILLILITER(S): 9 INJECTION, SOLUTION INTRAVENOUS at 12:00

## 2020-03-11 RX ADMIN — NYSTATIN CREAM 1 APPLICATION(S): 100000 CREAM TOPICAL at 05:52

## 2020-03-11 RX ADMIN — HEPARIN SODIUM 5000 UNIT(S): 5000 INJECTION INTRAVENOUS; SUBCUTANEOUS at 21:58

## 2020-03-11 RX ADMIN — SIMVASTATIN 20 MILLIGRAM(S): 20 TABLET, FILM COATED ORAL at 21:58

## 2020-03-11 RX ADMIN — Medication 15 MILLIGRAM(S): at 09:31

## 2020-03-11 RX ADMIN — Medication 3 MILLILITER(S): at 16:47

## 2020-03-11 RX ADMIN — Medication 4: at 17:45

## 2020-03-11 RX ADMIN — HEPARIN SODIUM 5000 UNIT(S): 5000 INJECTION INTRAVENOUS; SUBCUTANEOUS at 05:44

## 2020-03-11 RX ADMIN — LOSARTAN POTASSIUM 25 MILLIGRAM(S): 100 TABLET, FILM COATED ORAL at 09:32

## 2020-03-11 RX ADMIN — Medication 12 UNIT(S): at 17:44

## 2020-03-11 RX ADMIN — INSULIN GLARGINE 24 UNIT(S): 100 INJECTION, SOLUTION SUBCUTANEOUS at 05:45

## 2020-03-11 RX ADMIN — PRIMIDONE 50 MILLIGRAM(S): 250 TABLET ORAL at 21:58

## 2020-03-11 RX ADMIN — Medication 2: at 14:06

## 2020-03-11 NOTE — PROGRESS NOTE ADULT - SUBJECTIVE AND OBJECTIVE BOX
DIABETES FOLLOW UP NOTE: Saw pt earlier today  INTERVAL HX: 63 y/o F w/h/o uncontrolled T2DM with unknown complications. Also h/o functional paraplegia, hypothyroidism, pulmonary hypertension, COPD, RA on chronic prednisone, SBO s/p ex lap with lysis of adhesions c/b multifocal pneumonia with AMS of unknown etiology. Pt was on catatonic state that resolved with ativan administration and is now more alert but still confused and refusing meds/treatments on and off. Mild DKA over the weekend. Glycemic control remains variable due to variable PO intake. Per RN pt eating and had meds this am including insulin, Prednisone and synthroid.  No hypoglycemia. No improvement in mental status reported by staff.     Review of Systems:  Refused to answer any questions    Allergies    Depakote (Other)  Seroquel (Other (Severe))    Intolerances      MEDICATIONS:   insulin glargine Injectable (LANTUS) 24 Unit(s) SubCutaneous <User Schedule>  insulin lispro (HumaLOG) corrective regimen sliding scale   SubCutaneous three times a day before meals  insulin lispro (HumaLOG) corrective regimen sliding scale   SubCutaneous at bedtime  insulin lispro Injectable (HumaLOG) 10 Unit(s) SubCutaneous three times a day with meals  levothyroxine 175 MICROGram(s) Oral daily  predniSONE   Tablet 15 milliGRAM(s) Oral daily  simvastatin 20 milliGRAM(s) Oral at bedtime        PHYSICAL EXAM:  VITALS: T(C): 36.9 (03-11-20 @ 14:37)  T(F): 98.4 (03-11-20 @ 14:37), Max: 98.4 (03-11-20 @ 14:37)  HR: 92 (03-11-20 @ 14:37) (92 - 99)  BP: 125/72 (03-11-20 @ 14:37) (125/72 - 142/80)  RR:  (18 - 18)  SpO2:  (96% - 98%)  Wt(kg): --  GENERAL: Female laying in bed in NAD  Abdomen: Soft, nontender, non distended, obese with abdominal dressing D&I  Extremities: Warm, no edema in all 4 exts  NEURO: Alert, refusing to answer any questions    LABS:  POCT Blood Glucose.: 171 mg/dL (03-11-20 @ 14:02)  POCT Blood Glucose.: 241 mg/dL (03-11-20 @ 08:53)  POCT Blood Glucose.: 153 mg/dL (03-11-20 @ 00:35)  POCT Blood Glucose.: 182 mg/dL (03-10-20 @ 18:44)  POCT Blood Glucose.: 207 mg/dL (03-10-20 @ 12:10)  POCT Blood Glucose.: 140 mg/dL (03-10-20 @ 09:05)  POCT Blood Glucose.: 205 mg/dL (03-09-20 @ 22:34)  POCT Blood Glucose.: 151 mg/dL (03-09-20 @ 20:25)  POCT Blood Glucose.: 267 mg/dL (03-09-20 @ 14:16)  POCT Blood Glucose.: 122 mg/dL (03-09-20 @ 06:29)  POCT Blood Glucose.: 156 mg/dL (03-09-20 @ 01:51)  POCT Blood Glucose.: 211 mg/dL (03-08-20 @ 22:30)  POCT Blood Glucose.: 193 mg/dL (03-08-20 @ 19:46)  POCT Blood Glucose.: 194 mg/dL (03-08-20 @ 18:32)  POCT Blood Glucose.: 202 mg/dL (03-08-20 @ 17:00)                            9.7    9.86  )-----------( 408      ( 11 Mar 2020 10:15 )             31.2       03-11    139  |  104  |  10  ----------------------------<  243<H>  4.1   |  18<L>  |  0.82    EGFR if : 89    Ca    9.5      03-11  Mg     1.7     03-09  Phos  1.4     03-09    TPro  7.3  /  Alb  3.8  /  TBili  0.2  /  DBili  x   /  AST  11  /  ALT  16  /  AlkPhos  84  03-11      Thyroid Function Tests:  02-23 @ 13:25 TSH -- FreeT4 -- T3 53 Anti TPO -- Anti Thyroglobulin Ab -- TSI --  02-13 @ 09:05 TSH 17.80 FreeT4 -- T3 -- Anti TPO -- Anti Thyroglobulin Ab -- TSI --      Hemoglobin A1C, Whole Blood: 9.0 % <H> [4.0 - 5.6] (01-28-20 @ 19:30)  Hemoglobin A1C, Whole Blood: 9.2 % <H> [4.0 - 5.6] (01-27-20 @ 17:36)

## 2020-03-11 NOTE — PROGRESS NOTE ADULT - ATTENDING COMMENTS
Patient seen and examined today. I agree with the above findings, assessment, and plan with the following additions and exceptions:     Acute encephalopathy, unspecified. Improved but not at baseline per family.   Likely multifactorial. Patient with severe hypothyroidism, stable SDH on imaging, recently treated UTI, adverse reaction to VPA, and now mild DKA. Noted to have signs of AI at outside hospital. On chronic steroids for RA. Anterior abd wound does not show signs of infection. CSF autoimmune and paraneoplastic panels negative.   Patient intermittent refusing physical exam and labs. Will attempt daily labs. May need haldol prn prior to blood draws.   Endocrine assistance appreciated. We may need more isotonic crystalloid and uptitration of insulin. If patient refusing insulin, she is not to be fed.   Psych recommending abilify standing and haldol prn. Will inquire about increasing abilify. Assistance appreciated.   HSQ ordered on 3/7. No changes in mental status.  Patient reported some dyspnea this AM. Patient allowed me to do a brief exam. Course breath sounds R>L at bases. Atelectasis vs mild bronchospasm are possible. Duoneb x1 with improvement. Patient refusing CXR.    In order for patient to safely be discharge, she will need to demonstrate a consistent compliance with her medications. Hopefully this can be achieved with use of Abilify.   Patient noncompliance with medical management puts her at elevated risk fo readmission to the hospital.   Rest of plan as above.    Dr. Avery Dugan DO  Attending Physician  Division of Hospital Medicine  [facility]  Pager:  [phone] .

## 2020-03-11 NOTE — PROGRESS NOTE ADULT - PROBLEM SELECTOR PLAN 1
-test BG AC/HS  -C/w Lantus 24 units QAM.  -Change humalog dose to 12 units premeal   -C/w humalog mod dose sliding scale ac meals and hs.     Discharge plan: basal/bolus  -Even though pt is alert and talking, she still unable to care for her DM. Per primary team plan to discharge to rehab when more stable  -f/u outpt w/Dr Saul. Will make apt closer to discharge    -Plan discussed with pt's team and RN  Contact info: [phone] (24/7). pager [phone]

## 2020-03-11 NOTE — PROGRESS NOTE ADULT - ASSESSMENT
64F w/ a PMHx arthritis, hypothyroidism, pulmonary HTN, DM on insulin, HTN, COPD/CHRIS on noctournal CPAP and 3L home), RA on chronic prednisone, chronic tremors, SBO s/p ex lap with lysis of adhesions c/b evisceration p/w agitation 3 weeks of unclear etiology, found to be significantly hypothyroid (), MRI brain found to have small right sided SDH, hospital course c/b catatonia improved with Ativan. Course recently c/b DKA.

## 2020-03-11 NOTE — PROGRESS NOTE ADULT - PROBLEM SELECTOR PLAN 1
Likely underlying rapidly progressive dementia, hospital delirium.   -period of catatonic state which improved with ativan, continues on ativan  -per neuro and psych, c/w Ativan 0.5mg TID, son agreeable.   -inc to 5mg abilify qd  -haldol PRN agitation.   -Hypothyroidism possible etiology; , free T4 0.3. Endocrine following. Now on PO 175mcg synthroid qd. Preferring PO medications today.   -MRI brain 3mm right sided SDH no neurosurgical intervention   -LP 2/6 CSF studies wnl.  -repeat PT evaluation; pt refusing Likely underlying rapidly progressive dementia, hospital delirium.   -period of catatonic state which improved with ativan, continues on ativan  -inc to 5mg abilify qd  -haldol PRN agitation.   -Hypothyroidism possible etiology; , free T4 0.3. Endocrine following. Now on PO 175mcg synthroid qd. Preferring PO medications today.   -MRI brain 3mm right sided SDH no neurosurgical intervention   -LP 2/6 CSF studies wnl.  -repeat PT evaluation; pt refusing

## 2020-03-11 NOTE — PROGRESS NOTE ADULT - PROBLEM SELECTOR PLAN 2
-Pt currently on PO Synthroid.   -Please administer Synthroid 87.5 mcg IVP whenever pt refuses PO dose   -C/w multivitamins and Protonix administratio at 12 noon instead of am  -Re test TFT in a week to reassess any changes on levels.   -Plan discussed with pt's team.  Contact info: [phone] (24/7). pager [phone]

## 2020-03-11 NOTE — PROGRESS NOTE ADULT - ASSESSMENT
65 y/o F w/h/o uncontrolled T2DM with unknown complications. Also h/o functional paraplegia, hypothyroidism, pulmonary hypertension, COPD, RA on chronic prednisone, SBO s/p ex lap with lysis of adhesions c/b multifocal pneumonia with AMS of unknown etiology. Pt was on catatonic state that resolved with ativan administration and is now more alert but still confused and refusing meds/treatments on and off. Mild DKA over the weekend. Glycemic control remains variable without a glycemic pattern due to variable PO intake. Per RN pt eating and had meds this am as ordered. No hypoglycemia.  Will continue present insulin regimen for now. Primary team aware to give IV doses of steroid and synthroid whenever pt refuses these meds via PO. Noted pt requiring total of 12 to 14 units of Humalog ac meals most of the time. Will increase premeal insulin.     Spent 15 minutes assessing pt/labs/meds and discussing plan of care with primary team/staff

## 2020-03-11 NOTE — PROGRESS NOTE ADULT - PROBLEM SELECTOR PLAN 2
#DKA  AG 16 yesterday, BHB <2. Restarted PO  -moderate ISS  -lantus 24U qdaily  -10 units premeal  -endocrine team following. #DKA  -moderate ISS  -lantus 24U qdaily  -10 units premeal  -IVFs as tolerated.  -endocrine team following.

## 2020-03-11 NOTE — PROGRESS NOTE ADULT - SUBJECTIVE AND OBJECTIVE BOX
Parminder Cavanaugh, pgy 1  [facility]  [phone]    After 7pm; page night float    Patient is a 64y old  Female who presents with a chief complaint of change in mental status (10 Mar 2020 16:37)      SUBJECTIVE / OVERNIGHT EVENTS: Complained of chest pain last night, stating she was short of breath, however yelling during this time and saturating well throughout the entire process. EKG/Cardiac enzymes both negative. Refused to talk to me this morning   ADDITIONAL REVIEW OF SYSTEMS: unable to assess    MEDICATIONS  (STANDING):  ARIPiprazole  Solution 5 milliGRAM(s) Oral daily  budesonide  80 MICROgram(s)/formoterol 4.5 MICROgram(s) Inhaler 2 Puff(s) Inhalation two times a day  dextrose 5%. 1000 milliLiter(s) (50 mL/Hr) IV Continuous <Continuous>  dextrose 50% Injectable 12.5 Gram(s) IV Push once  dextrose 50% Injectable 25 Gram(s) IV Push once  dextrose 50% Injectable 25 Gram(s) IV Push once  heparin  Injectable 5000 Unit(s) SubCutaneous every 8 hours  influenza   Vaccine 0.5 milliLiter(s) IntraMuscular once  insulin glargine Injectable (LANTUS) 24 Unit(s) SubCutaneous <User Schedule>  insulin lispro (HumaLOG) corrective regimen sliding scale   SubCutaneous three times a day before meals  insulin lispro (HumaLOG) corrective regimen sliding scale   SubCutaneous at bedtime  insulin lispro Injectable (HumaLOG) 10 Unit(s) SubCutaneous three times a day with meals  levothyroxine 175 MICROGram(s) Oral daily  losartan 25 milliGRAM(s) Oral daily  metoprolol tartrate 25 milliGRAM(s) Oral two times a day  multivitamin/minerals/iron Oral Solution (CENTRUM) 15 milliLiter(s) Oral daily  nystatin Powder 1 Application(s) Topical every 12 hours  pantoprazole    Tablet 40 milliGRAM(s) Oral <User Schedule>  predniSONE   Tablet 15 milliGRAM(s) Oral daily  primidone 50 milliGRAM(s) Oral at bedtime  simvastatin 20 milliGRAM(s) Oral at bedtime  thiamine 100 milliGRAM(s) Oral daily    MEDICATIONS  (PRN):  acetaminophen   Tablet .. 975 milliGRAM(s) Oral every 6 hours PRN Moderate Pain (4 - 6), Severe Pain (7 - 10)  acetaminophen  Suppository .. 650 milliGRAM(s) Rectal every 6 hours PRN Temp greater or equal to 38C (100.4F)  albuterol/ipratropium for Nebulization 3 milliLiter(s) Nebulizer every 6 hours PRN Shortness of Breath and/or Wheezing  dextrose 40% Gel 15 Gram(s) Oral once PRN Blood Glucose LESS THAN 70 milliGRAM(s)/deciLiter  glucagon  Injectable 1 milliGRAM(s) IntraMuscular once PRN Glucose <70 milliGRAM(s)/deciLiter  haloperidol    Injectable 0.5 milliGRAM(s) IV Push every 4 hours PRN agitation  melatonin 3 milliGRAM(s) Oral at bedtime PRN Insomnia      CAPILLARY BLOOD GLUCOSE      POCT Blood Glucose.: 241 mg/dL (11 Mar 2020 08:53)  POCT Blood Glucose.: 153 mg/dL (11 Mar 2020 00:35)  POCT Blood Glucose.: 182 mg/dL (10 Mar 2020 18:44)  POCT Blood Glucose.: 207 mg/dL (10 Mar 2020 12:10)    I&O's Summary      PHYSICAL EXAM:  Vital Signs Last 24 Hrs  T(C): 36.3 (11 Mar 2020 04:51), Max: 36.9 (10 Mar 2020 13:51)  T(F): 97.4 (11 Mar 2020 04:51), Max: 98.4 (10 Mar 2020 13:51)  HR: 94 (11 Mar 2020 05:18) (89 - 102)  BP: 142/80 (11 Mar 2020 04:51) (121/71 - 157/95)  BP(mean): --  RR: 18 (11 Mar 2020 04:51) (18 - 18)  SpO2: 96% (11 Mar 2020 05:18) (94% - 98%)  unable to evaluate     LABS:                        9.6    10.80 )-----------( 385      ( 09 Mar 2020 16:24 )             31.0     03-10    140  |  106  |  10  ----------------------------<  191<H>  4.3   |  16<L>  |  0.94    Ca    8.9      10 Mar 2020 16:50  Phos  1.4     03-09  Mg     1.7     03-09    TPro  6.8  /  Alb  3.5  /  TBili  0.1<L>  /  DBili  x   /  AST  15  /  ALT  17  /  AlkPhos  90  03-10      CARDIAC MARKERS ( 10 Mar 2020 16:50 )  x     / x     / 44 U/L / x     / x                RADIOLOGY & ADDITIONAL TESTS:  Results Reviewed:   Imaging Personally Reviewed:  Electrocardiogram Personally Reviewed:    COORDINATION OF CARE:  Care Discussed with Consultants/Other Providers [Y/N]:  Prior or Outpatient Records Reviewed [Y/N]:

## 2020-03-12 DIAGNOSIS — I10 ESSENTIAL (PRIMARY) HYPERTENSION: ICD-10-CM

## 2020-03-12 LAB
GLUCOSE BLDC GLUCOMTR-MCNC: 103 MG/DL — HIGH (ref 70–99)
GLUCOSE BLDC GLUCOMTR-MCNC: 138 MG/DL — HIGH (ref 70–99)
GLUCOSE BLDC GLUCOMTR-MCNC: 140 MG/DL — HIGH (ref 70–99)
GLUCOSE BLDC GLUCOMTR-MCNC: 143 MG/DL — HIGH (ref 70–99)
GLUCOSE BLDC GLUCOMTR-MCNC: 189 MG/DL — HIGH (ref 70–99)
GLUCOSE BLDC GLUCOMTR-MCNC: 211 MG/DL — HIGH (ref 70–99)
GLUCOSE BLDC GLUCOMTR-MCNC: 245 MG/DL — HIGH (ref 70–99)

## 2020-03-12 PROCEDURE — 99232 SBSQ HOSP IP/OBS MODERATE 35: CPT | Mod: GC

## 2020-03-12 PROCEDURE — 99232 SBSQ HOSP IP/OBS MODERATE 35: CPT

## 2020-03-12 RX ORDER — INSULIN LISPRO 100/ML
14 VIAL (ML) SUBCUTANEOUS
Refills: 0 | Status: DISCONTINUED | OUTPATIENT
Start: 2020-03-12 | End: 2020-03-23

## 2020-03-12 RX ORDER — TRAMADOL HYDROCHLORIDE 50 MG/1
50 TABLET ORAL ONCE
Refills: 0 | Status: DISCONTINUED | OUTPATIENT
Start: 2020-03-12 | End: 2020-03-12

## 2020-03-12 RX ORDER — TRAMADOL HYDROCHLORIDE 50 MG/1
25 TABLET ORAL ONCE
Refills: 0 | Status: DISCONTINUED | OUTPATIENT
Start: 2020-03-12 | End: 2020-03-12

## 2020-03-12 RX ORDER — INSULIN LISPRO 100/ML
12 VIAL (ML) SUBCUTANEOUS
Refills: 0 | Status: DISCONTINUED | OUTPATIENT
Start: 2020-03-12 | End: 2020-03-23

## 2020-03-12 RX ORDER — INSULIN LISPRO 100/ML
12 VIAL (ML) SUBCUTANEOUS
Refills: 0 | Status: DISCONTINUED | OUTPATIENT
Start: 2020-03-12 | End: 2020-03-15

## 2020-03-12 RX ORDER — INSULIN GLARGINE 100 [IU]/ML
26 INJECTION, SOLUTION SUBCUTANEOUS
Refills: 0 | Status: DISCONTINUED | OUTPATIENT
Start: 2020-03-12 | End: 2020-03-21

## 2020-03-12 RX ADMIN — Medication 25 MILLIGRAM(S): at 19:35

## 2020-03-12 RX ADMIN — HEPARIN SODIUM 5000 UNIT(S): 5000 INJECTION INTRAVENOUS; SUBCUTANEOUS at 22:18

## 2020-03-12 RX ADMIN — BUDESONIDE AND FORMOTEROL FUMARATE DIHYDRATE 2 PUFF(S): 160; 4.5 AEROSOL RESPIRATORY (INHALATION) at 17:24

## 2020-03-12 RX ADMIN — Medication 2: at 08:47

## 2020-03-12 RX ADMIN — PRIMIDONE 50 MILLIGRAM(S): 250 TABLET ORAL at 22:18

## 2020-03-12 RX ADMIN — Medication 15 MILLIGRAM(S): at 05:49

## 2020-03-12 RX ADMIN — LOSARTAN POTASSIUM 25 MILLIGRAM(S): 100 TABLET, FILM COATED ORAL at 05:49

## 2020-03-12 RX ADMIN — Medication 12 UNIT(S): at 17:14

## 2020-03-12 RX ADMIN — TRAMADOL HYDROCHLORIDE 50 MILLIGRAM(S): 50 TABLET ORAL at 20:05

## 2020-03-12 RX ADMIN — ARIPIPRAZOLE 5 MILLIGRAM(S): 15 TABLET ORAL at 22:20

## 2020-03-12 RX ADMIN — HEPARIN SODIUM 5000 UNIT(S): 5000 INJECTION INTRAVENOUS; SUBCUTANEOUS at 05:51

## 2020-03-12 RX ADMIN — HEPARIN SODIUM 5000 UNIT(S): 5000 INJECTION INTRAVENOUS; SUBCUTANEOUS at 15:33

## 2020-03-12 RX ADMIN — Medication 25 MILLIGRAM(S): at 06:29

## 2020-03-12 RX ADMIN — SIMVASTATIN 20 MILLIGRAM(S): 20 TABLET, FILM COATED ORAL at 22:18

## 2020-03-12 RX ADMIN — Medication 175 MICROGRAM(S): at 05:51

## 2020-03-12 RX ADMIN — TRAMADOL HYDROCHLORIDE 50 MILLIGRAM(S): 50 TABLET ORAL at 19:24

## 2020-03-12 RX ADMIN — PANTOPRAZOLE SODIUM 40 MILLIGRAM(S): 20 TABLET, DELAYED RELEASE ORAL at 12:07

## 2020-03-12 RX ADMIN — Medication 12 UNIT(S): at 08:48

## 2020-03-12 RX ADMIN — NYSTATIN CREAM 1 APPLICATION(S): 100000 CREAM TOPICAL at 05:58

## 2020-03-12 RX ADMIN — INSULIN GLARGINE 24 UNIT(S): 100 INJECTION, SOLUTION SUBCUTANEOUS at 07:01

## 2020-03-12 RX ADMIN — Medication 4: at 12:01

## 2020-03-12 RX ADMIN — BUDESONIDE AND FORMOTEROL FUMARATE DIHYDRATE 2 PUFF(S): 160; 4.5 AEROSOL RESPIRATORY (INHALATION) at 05:45

## 2020-03-12 RX ADMIN — Medication 12 UNIT(S): at 12:01

## 2020-03-12 RX ADMIN — NYSTATIN CREAM 1 APPLICATION(S): 100000 CREAM TOPICAL at 18:36

## 2020-03-12 NOTE — PROGRESS NOTE ADULT - ATTENDING COMMENTS
Patient seen and examined today. I agree with the above findings, assessment, and plan with the following additions and exceptions:     Acute encephalopathy, unspecified. Improved but not at baseline per family.   Likely multifactorial. Patient with severe hypothyroidism, stable SDH on imaging, recently treated UTI, adverse reaction to VPA, and mild DKA. Noted to have signs of AI at outside hospital. On chronic steroids for RA. Anterior abd wound does not show signs of infection. CSF autoimmune and paraneoplastic panels negative.   Patient intermittent refusing physical exam and labs. Will attempt daily labs. May need haldol prn prior to blood draws.   Endocrine assistance appreciated. If patient refusing insulin, she is not to be fed.   Psych recommending Abilify standing and haldol prn. Psych to discuss with family possibility of increasing Abilify. Assistance appreciated.   HSQ ordered on 3/7. No changes in mental status.  Patient no longer endorsing SOB. Respiratory status stable.  Neuro participation in care appreciated as well.   In order for patient to safely be discharge, she will need to demonstrate a consistent compliance with her medications. Hopefully this can be achieved with use of increased dose of Abilify.   Patient noncompliance with medical management puts her at elevated risk fo readmission to the hospital.   Rest of plan as above.    Dr. Avery Dugan DO  Attending Physician  Division of Hospital Medicine  [facility]  Pager:  [phone] .

## 2020-03-12 NOTE — PROGRESS NOTE BEHAVIORAL HEALTH - NSBHCHARTREVIEWINVESTIGATE_PSY_A_CORE FT
< from: 12 Lead ECG (02.06.20 @ 14:39) >    Ventricular Rate 102 BPM    Atrial Rate 102 BPM    P-R Interval 130 ms    QRS Duration 70 ms    Q-T Interval 384 ms    QTC Calculation(Bezet) 500 ms    P Axis 65 degrees    R Axis 35 degrees    T Axis 48 degrees    Diagnosis Line SINUS TACHYCARDIA WITH FUSION COMPLEXES  OTHERWISE NORMAL ECG    Confirmed by MD Krystian, Renato ([id number]) on 2/7/2020 11:23:58 AM    < end of copied text >
< from: 12 Lead ECG (02.06.20 @ 14:39) >    Ventricular Rate 102 BPM    Atrial Rate 102 BPM    P-R Interval 130 ms    QRS Duration 70 ms    Q-T Interval 384 ms    QTC Calculation(Bezet) 500 ms    P Axis 65 degrees    R Axis 35 degrees    T Axis 48 degrees    Diagnosis Line SINUS TACHYCARDIA WITH FUSION COMPLEXES  OTHERWISE NORMAL ECG    Confirmed by MD Krystian, Renato ([id number]) on 2/7/2020 11:23:58 AM    < end of copied text >
EKG 3/10 with QTc 460
EKG @11:47AM 3/8 with QTc 433
Ventricular Rate 107 BPM    Atrial Rate 107 BPM    P-R Interval 168 ms    QRS Duration 80 ms    Q-T Interval 338 ms    QTC Calculation(Bezet) 451 ms    P Axis 70 degrees    R Axis 54 degrees    T Axis 49 degrees    Diagnosis Line SINUS TACHYCARDIA  NONSPECIFIC T WAVE ABNORMALITY  ABNORMAL ECG    Confirmed by MD CHANTELL, PRAMOD ([id number]) on 2/14/2020 7:33:45 AM
Ventricular Rate 107 BPM    Atrial Rate 107 BPM    P-R Interval 168 ms    QRS Duration 80 ms    Q-T Interval 338 ms    QTC Calculation(Bezet) 451 ms    P Axis 70 degrees    R Axis 54 degrees    T Axis 49 degrees    Diagnosis Line SINUS TACHYCARDIA  NONSPECIFIC T WAVE ABNORMALITY  ABNORMAL ECG    Confirmed by MD CHANTELL, PRAMOD ([id number]) on 2/14/2020 7:33:45 AM
< from: 12 Lead ECG (01.24.20 @ 16:20) >      Ventricular Rate 106 BPM    Atrial Rate 106 BPM    P-R Interval 126 ms    QRS Duration 82 ms    Q-T Interval 358 ms    QTC Calculation(Bezet) 475 ms    P Axis 57 degrees    R Axis 14 degrees    T Axis 49 degrees    Diagnosis Line SINUS TACHYCARDIA  OTHERWISE NORMAL ECG    Confirmed by JANNETTE SNELL VELIA ([id number]) on 1/25/2020 11:31:18 AM    < end of copied text >

## 2020-03-12 NOTE — PROGRESS NOTE ADULT - PROBLEM SELECTOR PLAN 2
#DKA  -moderate ISS  -lantus 24U qdaily  -12 units premeal  -IVFs as tolerated.  -endocrine team following.  -gap closed. #DKA  -moderate ISS  -lantus 26U qdaily  -14 units humalog for breakfast.  -12 units humalog for lunch and dinner.  -IVFs as tolerated.  -endocrine team following.  -gap closed x1

## 2020-03-12 NOTE — PROGRESS NOTE ADULT - SUBJECTIVE AND OBJECTIVE BOX
Chief Complaint: Evaluating this 63 y/o F for uncontrolled Type 2 DM w/ hyperglycemia      Interval History: Reports some abdominal pain and constipation. Also SOB. Glucose above goal mostly at lunch and dinner.     MEDICATIONS  (STANDING):  ARIPiprazole  Solution 5 milliGRAM(s) Oral daily  budesonide  80 MICROgram(s)/formoterol 4.5 MICROgram(s) Inhaler 2 Puff(s) Inhalation two times a day  dextrose 5%. 1000 milliLiter(s) (50 mL/Hr) IV Continuous <Continuous>  dextrose 50% Injectable 12.5 Gram(s) IV Push once  dextrose 50% Injectable 25 Gram(s) IV Push once  dextrose 50% Injectable 25 Gram(s) IV Push once  heparin  Injectable 5000 Unit(s) SubCutaneous every 8 hours  influenza   Vaccine 0.5 milliLiter(s) IntraMuscular once  insulin glargine Injectable (LANTUS) 26 Unit(s) SubCutaneous <User Schedule>  insulin lispro (HumaLOG) corrective regimen sliding scale   SubCutaneous three times a day before meals  insulin lispro (HumaLOG) corrective regimen sliding scale   SubCutaneous at bedtime  insulin lispro Injectable (HumaLOG) 14 Unit(s) SubCutaneous before breakfast  insulin lispro Injectable (HumaLOG) 12 Unit(s) SubCutaneous before lunch  insulin lispro Injectable (HumaLOG) 12 Unit(s) SubCutaneous before dinner  levothyroxine 175 MICROGram(s) Oral daily  losartan 25 milliGRAM(s) Oral daily  metoprolol tartrate 25 milliGRAM(s) Oral two times a day  multivitamin/minerals/iron Oral Solution (CENTRUM) 15 milliLiter(s) Oral daily  nystatin Powder 1 Application(s) Topical every 12 hours  pantoprazole    Tablet 40 milliGRAM(s) Oral <User Schedule>  predniSONE   Tablet 15 milliGRAM(s) Oral daily  primidone 50 milliGRAM(s) Oral at bedtime  simvastatin 20 milliGRAM(s) Oral at bedtime  sodium chloride 0.9% Bolus 500 milliLiter(s) IV Bolus once  thiamine 100 milliGRAM(s) Oral daily    MEDICATIONS  (PRN):  acetaminophen   Tablet .. 975 milliGRAM(s) Oral every 6 hours PRN Moderate Pain (4 - 6), Severe Pain (7 - 10)  acetaminophen  Suppository .. 650 milliGRAM(s) Rectal every 6 hours PRN Temp greater or equal to 38C (100.4F)  albuterol/ipratropium for Nebulization 3 milliLiter(s) Nebulizer every 6 hours PRN Shortness of Breath and/or Wheezing  dextrose 40% Gel 15 Gram(s) Oral once PRN Blood Glucose LESS THAN 70 milliGRAM(s)/deciLiter  glucagon  Injectable 1 milliGRAM(s) IntraMuscular once PRN Glucose <70 milliGRAM(s)/deciLiter  melatonin 3 milliGRAM(s) Oral at bedtime PRN Insomnia      Allergies    Depakote (Other)  Seroquel (Other (Severe))    Intolerances      Review of Systems:  Constitutional: No fever  Eyes: No blurry vision  Cardiovascular: No chest pain  Respiratory: No SOB  GI: No abdominal pain, No nausea, No vomiting  Endocrine: as noted in HPI    All other negative      PHYSICAL EXAM:  VITALS: T(C): 36.9 (03-12-20 @ 05:43)  T(F): 98.4 (03-12-20 @ 05:43), Max: 98.4 (03-12-20 @ 05:43)  HR: 92 (03-12-20 @ 05:45) (77 - 98)  BP: 139/80 (03-12-20 @ 05:43) (139/80 - 175/84)  RR:  (18 - 20)  SpO2:  (96% - 99%)  Wt(kg): --  GENERAL: NAD at this time  EYES: EOMI, No proptosis  HEENT:  Atraumatic, Normocephalic,   RESPIRATORY: full excursion, non labored  CARDIOVASCULAR: Regular rhythm; normal S1/S2, +LE peripheral edema  GI: Soft, nontender, non distended, normal bowel sounds  SKIN: Warm and dry  PSYCH: normal affect, normal mood      POCT Blood Glucose.: 245 mg/dL (03-12-20 @ 11:59)  POCT Blood Glucose.: 189 mg/dL (03-12-20 @ 08:47)  POCT Blood Glucose.: 138 mg/dL (03-12-20 @ 07:00)  POCT Blood Glucose.: 140 mg/dL (03-11-20 @ 21:56)  POCT Blood Glucose.: 211 mg/dL (03-11-20 @ 17:44)  POCT Blood Glucose.: 171 mg/dL (03-11-20 @ 14:02)  POCT Blood Glucose.: 241 mg/dL (03-11-20 @ 08:53)  POCT Blood Glucose.: 153 mg/dL (03-11-20 @ 00:35)  POCT Blood Glucose.: 182 mg/dL (03-10-20 @ 18:44)  POCT Blood Glucose.: 207 mg/dL (03-10-20 @ 12:10)  POCT Blood Glucose.: 140 mg/dL (03-10-20 @ 09:05)  POCT Blood Glucose.: 205 mg/dL (03-09-20 @ 22:34)  POCT Blood Glucose.: 151 mg/dL (03-09-20 @ 20:25)        03-11    140  |  107  |  10  ----------------------------<  196<H>  4.6   |  18<L>  |  0.83    EGFR if : 88  EGFR if non : 76    Ca    9.9      03-11    TPro  7.2  /  Alb  3.6  /  TBili  0.1<L>  /  DBili  x   /  AST  15  /  ALT  18  /  AlkPhos  84  03-11        Thyroid Function Tests:  02-23 @ 13:25 TSH -- FreeT4 -- T3 53 Anti TPO -- Anti Thyroglobulin Ab -- TSI --  02-13 @ 09:05 TSH 17.80 FreeT4 -- T3 -- Anti TPO -- Anti Thyroglobulin Ab -- TSI --      Hemoglobin A1C, Whole Blood: 9.0 % <H> [4.0 - 5.6] (01-28-20 @ 19:30)  Hemoglobin A1C, Whole Blood: 9.2 % <H> [4.0 - 5.6] (01-27-20 @ 17:36) Stable

## 2020-03-12 NOTE — PROGRESS NOTE ADULT - PROBLEM SELECTOR PLAN 2
-Pt currently on PO Synthroid.   -Please administer Synthroid 87.5 mcg IVP whenever pt refuses PO dose   -C/w multivitamins and Protonix administratio at 12 noon instead of am  -Re test TFT in a week to reassess any changes on levels.

## 2020-03-12 NOTE — PROGRESS NOTE ADULT - PROBLEM SELECTOR PLAN 1
-test BG AC/HS  -Increase Lantus slightly to 26 units QAM.  -Change humalog dose to 14/12/12 units premeal   -C/w humalog mod dose sliding scale ac meals and hs.     Discharge plan: basal/bolus  -Even though pt is alert and talking, she still unable to care for her DM. Per primary team plan to discharge to rehab when more stable  -f/u outpt w/Dr Saul. Will make apt closer to discharge

## 2020-03-12 NOTE — PROGRESS NOTE BEHAVIORAL HEALTH - NSBHCONSULTFOLLOWAFTERCARE_PSY_A_CORE FT
OP psych f/u at [facility]- [phone]  [facility] clinic- [phone]

## 2020-03-12 NOTE — PROGRESS NOTE BEHAVIORAL HEALTH - CASE SUMMARY
63 yo AAF, single, lives with a 24/7 HHA in a private home, has an adult son, with extensive medical history including taking standing corticosteroids daily, + chronic pain on opiates; BIB EMS from home for AMS, course c/b catatonia, now on Ativan/Abilify, following up for management of AMS.  Pt verbal today, ranting about being "ordered around" by unqualified residents/interns, states she feels find and is ready to go home.  No SI/HI or AVH.  Intermittently refusing meds.  3/12: pt irritable, hostile, dismissive, uncooperative with interview questions.  Accepted Abilify but intermittently refuses other meds.  Awaiting callback from pt's son regarding further med management.  Can c/w Haldol PRN agitation in addition to Abilify for now.
63 yo AAF, single, lives with a 24/7 HHA in a private home, has an adult son, with extensive medical history including taking standing corticosteroids daily, + chronic pain on opiates; BIB EMS from home for AMS, course c/b catatonia, now on Ativan/Abilify, following up for management of AMS.  Pt verbal today, ranting about being "ordered around" by unqualified residents/interns, states she feels find and is ready to go home.  No SI/HI or AVH.  Intermittently refusing meds.  Agree with resident's assessment and plan as above.

## 2020-03-12 NOTE — PROGRESS NOTE BEHAVIORAL HEALTH - NSBHLOC_PSY_A_CORE
Alert
Lethargic, arousable to verbal stimulus
Alert

## 2020-03-12 NOTE — PROGRESS NOTE BEHAVIORAL HEALTH - NSBHCONSULTRECOMMENDOTHER_PSY_A_CORE FT
1) continue ativan 0.5 mg po TID for anxiety/irritable mood, had presentation of catatonia 7-10 days ago and responded well to ativan    2) it should be noted, that due to pt's vascular lesions (hx of subacute stroke/SDH), pt may be at a new cognitive baseline, rule out dementia process. her mood lability symptoms have improved since her admission.     3) due to agitation and mood lability symptoms, continue Abilify. Norris, pt's son and HCP had given consent to give abilify 2.5 mg qdaily, with possible plan to raise to 5 mg if needed for mood instability. Please ensure QTc < 500 if increasing the dose.    4) for acute agitation, consider haldol 0.5 mg IV Q4hr PRN, f/u QTc < 500. if 0.5 mg ineffective, consider 1 mg IV Q4hr instead. Norris also aware of this recommendation and gives consent    5) no indication for inpt psychiatric admission 1) it should be noted, that due to pt's vascular lesions (hx of subacute stroke/SDH), pt may be at a new cognitive baseline, rule out dementia process. her mood lability symptoms have improved since her admission.     2) due to agitation and mood lability symptoms, continue Abilify 5mg QD. Can give haldol 1mg q4hr prn. Attempted to reach Norris to discuss increasing Abilify to 10mg QD, left voicemail to call back, plan to inform Norris that Abilify can be increased to 30mg QD, so 10mg is still a relatively low dose. Plan B if Norris declines Abilify would be Haldol 2mg IV TID for 1-2 days to improve patient's compliance with medications. Consent from Norris is required prior to make these changes. Please ensure QTc < 500 if increasing the dose.    3) for acute agitation, consider haldol 1 mg IV Q4hr PRN, f/u QTc < 500. Norris also aware of this recommendation and gives consent    4) no indication for inpt psychiatric admission 1) it should be noted, that due to pt's vascular lesions (hx of subacute stroke/SDH), pt may be at a new cognitive baseline, rule out dementia process. her mood lability symptoms have improved since her admission.     2) due to agitation and mood lability symptoms, continue Abilify 5mg QD. Can give haldol 1mg q4hr prn. Attempted to reach Norris to discuss increasing Abilify to 10mg QD, left voicemail to call back.    3) for acute agitation, consider haldol 1 mg IV Q4hr PRN, f/u QTc < 500. Norris also aware of this recommendation and gives consent    4) no indication for inpt psychiatric admission

## 2020-03-12 NOTE — PROGRESS NOTE ADULT - PROBLEM SELECTOR PLAN 1
Likely underlying rapidly progressive dementia, hospital delirium.   -period of catatonic state which improved with ativan, continues on ativan  -inc to 5mg abilify qd  -haldol PRN agitation.   -Hypothyroidism possible etiology; , free T4 0.3. Endocrine following. Now on PO 175mcg synthroid qd. Preferring PO medications today.   -MRI brain 3mm right sided SDH no neurosurgical intervention   -LP 2/6 CSF studies wnl.  -repeat PT evaluation; pt refusing

## 2020-03-12 NOTE — PROGRESS NOTE ADULT - SUBJECTIVE AND OBJECTIVE BOX
Parminder Cavanaugh, pgy 1  [facility]  [phone]  After 7pm; page night float    Patient is a 64y old  Female who presents with a chief complaint of change in mental status (11 Mar 2020 16:55)      SUBJECTIVE / OVERNIGHT EVENTS: No events overnight. Pt insisting on being discharged. Refusing to talk to me because "I'm not her doctor'.   ADDITIONAL REVIEW OF SYSTEMS: unable to assess.     MEDICATIONS  (STANDING):  ARIPiprazole  Solution 5 milliGRAM(s) Oral daily  budesonide  80 MICROgram(s)/formoterol 4.5 MICROgram(s) Inhaler 2 Puff(s) Inhalation two times a day  dextrose 5%. 1000 milliLiter(s) (50 mL/Hr) IV Continuous <Continuous>  dextrose 50% Injectable 12.5 Gram(s) IV Push once  dextrose 50% Injectable 25 Gram(s) IV Push once  dextrose 50% Injectable 25 Gram(s) IV Push once  heparin  Injectable 5000 Unit(s) SubCutaneous every 8 hours  influenza   Vaccine 0.5 milliLiter(s) IntraMuscular once  insulin glargine Injectable (LANTUS) 24 Unit(s) SubCutaneous <User Schedule>  insulin lispro (HumaLOG) corrective regimen sliding scale   SubCutaneous three times a day before meals  insulin lispro (HumaLOG) corrective regimen sliding scale   SubCutaneous at bedtime  insulin lispro Injectable (HumaLOG) 12 Unit(s) SubCutaneous three times a day with meals  levothyroxine 175 MICROGram(s) Oral daily  losartan 25 milliGRAM(s) Oral daily  metoprolol tartrate 25 milliGRAM(s) Oral two times a day  multivitamin/minerals/iron Oral Solution (CENTRUM) 15 milliLiter(s) Oral daily  nystatin Powder 1 Application(s) Topical every 12 hours  pantoprazole    Tablet 40 milliGRAM(s) Oral <User Schedule>  predniSONE   Tablet 15 milliGRAM(s) Oral daily  primidone 50 milliGRAM(s) Oral at bedtime  simvastatin 20 milliGRAM(s) Oral at bedtime  sodium chloride 0.9% Bolus 500 milliLiter(s) IV Bolus once  thiamine 100 milliGRAM(s) Oral daily    MEDICATIONS  (PRN):  acetaminophen   Tablet .. 975 milliGRAM(s) Oral every 6 hours PRN Moderate Pain (4 - 6), Severe Pain (7 - 10)  acetaminophen  Suppository .. 650 milliGRAM(s) Rectal every 6 hours PRN Temp greater or equal to 38C (100.4F)  albuterol/ipratropium for Nebulization 3 milliLiter(s) Nebulizer every 6 hours PRN Shortness of Breath and/or Wheezing  dextrose 40% Gel 15 Gram(s) Oral once PRN Blood Glucose LESS THAN 70 milliGRAM(s)/deciLiter  glucagon  Injectable 1 milliGRAM(s) IntraMuscular once PRN Glucose <70 milliGRAM(s)/deciLiter  haloperidol    Injectable 1 milliGRAM(s) IV Push every 4 hours PRN agitation  melatonin 3 milliGRAM(s) Oral at bedtime PRN Insomnia      CAPILLARY BLOOD GLUCOSE      POCT Blood Glucose.: 245 mg/dL (12 Mar 2020 11:59)  POCT Blood Glucose.: 189 mg/dL (12 Mar 2020 08:47)  POCT Blood Glucose.: 138 mg/dL (12 Mar 2020 07:00)  POCT Blood Glucose.: 140 mg/dL (11 Mar 2020 21:56)  POCT Blood Glucose.: 211 mg/dL (11 Mar 2020 17:44)  POCT Blood Glucose.: 171 mg/dL (11 Mar 2020 14:02)    I&O's Summary    11 Mar 2020 07:01  -  12 Mar 2020 07:00  --------------------------------------------------------  IN: 480 mL / OUT: 0 mL / NET: 480 mL    12 Mar 2020 07:01  -  12 Mar 2020 13:17  --------------------------------------------------------  IN: 320 mL / OUT: 0 mL / NET: 320 mL        PHYSICAL EXAM:  Vital Signs Last 24 Hrs  T(C): 36.9 (12 Mar 2020 05:43), Max: 36.9 (11 Mar 2020 14:37)  T(F): 98.4 (12 Mar 2020 05:43), Max: 98.4 (11 Mar 2020 14:37)  HR: 92 (12 Mar 2020 05:45) (77 - 98)  BP: 139/80 (12 Mar 2020 05:43) (125/72 - 175/84)  BP(mean): --  RR: 18 (12 Mar 2020 05:43) (18 - 20)  SpO2: 98% (12 Mar 2020 05:45) (96% - 99%)  unable to evaluate.     LABS:                        9.7    9.86  )-----------( 408      ( 11 Mar 2020 10:15 )             31.2     03-11    140  |  107  |  10  ----------------------------<  196<H>  4.6   |  18<L>  |  0.83    Ca    9.9      11 Mar 2020 17:31    TPro  7.2  /  Alb  3.6  /  TBili  0.1<L>  /  DBili  x   /  AST  15  /  ALT  18  /  AlkPhos  84  03-11      CARDIAC MARKERS ( 10 Mar 2020 16:50 )  x     / x     / 44 U/L / x     / x                RADIOLOGY & ADDITIONAL TESTS:  Results Reviewed:   Imaging Personally Reviewed:  Electrocardiogram Personally Reviewed:    COORDINATION OF CARE:  Care Discussed with Consultants/Other Providers [Y/N]:  Prior or Outpatient Records Reviewed [Y/N]:

## 2020-03-13 LAB
GLUCOSE BLDC GLUCOMTR-MCNC: 152 MG/DL — HIGH (ref 70–99)
GLUCOSE BLDC GLUCOMTR-MCNC: 153 MG/DL — HIGH (ref 70–99)
GLUCOSE BLDC GLUCOMTR-MCNC: 164 MG/DL — HIGH (ref 70–99)
GLUCOSE BLDC GLUCOMTR-MCNC: 187 MG/DL — HIGH (ref 70–99)
GLUCOSE BLDC GLUCOMTR-MCNC: 96 MG/DL — SIGNIFICANT CHANGE UP (ref 70–99)

## 2020-03-13 PROCEDURE — 99232 SBSQ HOSP IP/OBS MODERATE 35: CPT

## 2020-03-13 PROCEDURE — 99232 SBSQ HOSP IP/OBS MODERATE 35: CPT | Mod: GC

## 2020-03-13 RX ORDER — TRAMADOL HYDROCHLORIDE 50 MG/1
25 TABLET ORAL ONCE
Refills: 0 | Status: DISCONTINUED | OUTPATIENT
Start: 2020-03-13 | End: 2020-03-13

## 2020-03-13 RX ADMIN — LOSARTAN POTASSIUM 25 MILLIGRAM(S): 100 TABLET, FILM COATED ORAL at 06:23

## 2020-03-13 RX ADMIN — Medication 14 UNIT(S): at 08:40

## 2020-03-13 RX ADMIN — Medication 15 MILLIGRAM(S): at 06:23

## 2020-03-13 RX ADMIN — BUDESONIDE AND FORMOTEROL FUMARATE DIHYDRATE 2 PUFF(S): 160; 4.5 AEROSOL RESPIRATORY (INHALATION) at 17:30

## 2020-03-13 RX ADMIN — HEPARIN SODIUM 5000 UNIT(S): 5000 INJECTION INTRAVENOUS; SUBCUTANEOUS at 13:48

## 2020-03-13 RX ADMIN — NYSTATIN CREAM 1 APPLICATION(S): 100000 CREAM TOPICAL at 06:24

## 2020-03-13 RX ADMIN — INSULIN GLARGINE 26 UNIT(S): 100 INJECTION, SOLUTION SUBCUTANEOUS at 06:56

## 2020-03-13 RX ADMIN — HEPARIN SODIUM 5000 UNIT(S): 5000 INJECTION INTRAVENOUS; SUBCUTANEOUS at 22:39

## 2020-03-13 RX ADMIN — Medication 975 MILLIGRAM(S): at 17:36

## 2020-03-13 RX ADMIN — NYSTATIN CREAM 1 APPLICATION(S): 100000 CREAM TOPICAL at 17:35

## 2020-03-13 RX ADMIN — TRAMADOL HYDROCHLORIDE 25 MILLIGRAM(S): 50 TABLET ORAL at 13:46

## 2020-03-13 RX ADMIN — HEPARIN SODIUM 5000 UNIT(S): 5000 INJECTION INTRAVENOUS; SUBCUTANEOUS at 06:24

## 2020-03-13 RX ADMIN — Medication 2: at 17:35

## 2020-03-13 RX ADMIN — Medication 25 MILLIGRAM(S): at 17:35

## 2020-03-13 RX ADMIN — Medication 2: at 08:40

## 2020-03-13 RX ADMIN — Medication 100 MILLIGRAM(S): at 13:48

## 2020-03-13 RX ADMIN — Medication 25 MILLIGRAM(S): at 06:23

## 2020-03-13 RX ADMIN — SIMVASTATIN 20 MILLIGRAM(S): 20 TABLET, FILM COATED ORAL at 22:39

## 2020-03-13 RX ADMIN — PANTOPRAZOLE SODIUM 40 MILLIGRAM(S): 20 TABLET, DELAYED RELEASE ORAL at 13:47

## 2020-03-13 RX ADMIN — PRIMIDONE 50 MILLIGRAM(S): 250 TABLET ORAL at 22:39

## 2020-03-13 RX ADMIN — Medication 175 MICROGRAM(S): at 06:23

## 2020-03-13 NOTE — PROGRESS NOTE ADULT - SUBJECTIVE AND OBJECTIVE BOX
Chief Complaint/Follow-up on: T2DM, h/o DKA that has resolved. Hypothyroidism    Subjective:    DIET: Cons Carb no snacks    Endocrine History as per consult:  In terms of DM  -Long term history of diabetes   -At home she takes Lantus 20 units at bedtime   -Novolog 2-4 units before meals   -Blood glucose levels are 290-334   -No hypoglycemic episodes in the past   -history of cataracts, no recent optho visits  -She has not complained on neuropathy in the past         MEDICATIONS  (STANDING):  ARIPiprazole  Solution 5 milliGRAM(s) Oral daily  budesonide  80 MICROgram(s)/formoterol 4.5 MICROgram(s) Inhaler 2 Puff(s) Inhalation two times a day  dextrose 5%. 1000 milliLiter(s) (50 mL/Hr) IV Continuous <Continuous>  dextrose 50% Injectable 12.5 Gram(s) IV Push once  dextrose 50% Injectable 25 Gram(s) IV Push once  dextrose 50% Injectable 25 Gram(s) IV Push once  heparin  Injectable 5000 Unit(s) SubCutaneous every 8 hours  influenza   Vaccine 0.5 milliLiter(s) IntraMuscular once  insulin glargine Injectable (LANTUS) 26 Unit(s) SubCutaneous <User Schedule>  insulin lispro (HumaLOG) corrective regimen sliding scale MOD  SubCutaneous three times a day before meals  insulin lispro (HumaLOG) corrective regimen sliding scale MOD  SubCutaneous at bedtime  insulin lispro Injectable (HumaLOG) 14 Unit(s) SubCutaneous before breakfast  insulin lispro Injectable (HumaLOG) 12 Unit(s) SubCutaneous before lunch  insulin lispro Injectable (HumaLOG) 12 Unit(s) SubCutaneous before dinner  levothyroxine 175 MICROGram(s) Oral daily  losartan 25 milliGRAM(s) Oral daily  metoprolol tartrate 25 milliGRAM(s) Oral two times a day  multivitamin/minerals/iron Oral Solution (CENTRUM) 15 milliLiter(s) Oral daily  nystatin Powder 1 Application(s) Topical every 12 hours  pantoprazole    Tablet 40 milliGRAM(s) Oral <User Schedule>  predniSONE   Tablet 15 milliGRAM(s) Oral daily  primidone 50 milliGRAM(s) Oral at bedtime  simvastatin 20 milliGRAM(s) Oral at bedtime  thiamine 100 milliGRAM(s) Oral daily    MEDICATIONS  (PRN):  acetaminophen   Tablet .. 975 milliGRAM(s) Oral every 6 hours PRN Moderate Pain (4 - 6), Severe Pain (7 - 10)  acetaminophen  Suppository .. 650 milliGRAM(s) Rectal every 6 hours PRN Temp greater or equal to 38C (100.4F)  albuterol/ipratropium for Nebulization 3 milliLiter(s) Nebulizer every 6 hours PRN Shortness of Breath and/or Wheezing  dextrose 40% Gel 15 Gram(s) Oral once PRN Blood Glucose LESS THAN 70 milliGRAM(s)/deciLiter  glucagon  Injectable 1 milliGRAM(s) IntraMuscular once PRN Glucose <70 milliGRAM(s)/deciLiter  melatonin 3 milliGRAM(s) Oral at bedtime PRN Insomnia      PHYSICAL EXAM:  VITALS: T(C): 37 (03-13-20 @ 13:53)  T(F): 98.6 (03-13-20 @ 13:53), Max: 98.6 (03-13-20 @ 13:53)  HR: 108 (03-13-20 @ 13:53) (63 - 108)  BP: 144/82 (03-13-20 @ 13:53) (139/89 - 166/95)  RR:  (18 - 18)  SpO2:  (96% - 99%)  Wt(kg): 94.3 kg    GENERAL: NAD, well-groomed, well-developed  EYES: No proptosis, no injection  HEENT:  Atraumatic, Normocephalic, moist mucous membranes  THYROID: Normal size, no palpable nodules  RESPIRATORY: Clear to auscultation bilaterally; No rales, rhonchi, wheezing, or rubs  CARDIOVASCULAR: Regular rate and rhythm; No murmurs; no peripheral edema  GI: Soft, nontender, non distended, normal bowel sounds  CUSHING'S SIGNS: no striae    POCT Blood Glucose.: 96 mg/dL (03-13-20 @ 13:36)  POCT Blood Glucose.: 164 mg/dL (03-13-20 @ 08:29) H14+2  POCT Blood Glucose.: 153 mg/dL (03-13-20 @ 06:53)    POCT Blood Glucose.: 103 mg/dL (03-12-20 @ 22:01) L26  POCT Blood Glucose.: 143 mg/dL (03-12-20 @ 16:56) H12  POCT Blood Glucose.: 245 mg/dL (03-12-20 @ 11:59) H12  POCT Blood Glucose.: 189 mg/dL (03-12-20 @ 08:47) H12+4  POCT Blood Glucose.: 138 mg/dL (03-12-20 @ 07:00)    POCT Blood Glucose.: 140 mg/dL (03-11-20 @ 21:56)  POCT Blood Glucose.: 211 mg/dL (03-11-20 @ 17:44)  POCT Blood Glucose.: 171 mg/dL (03-11-20 @ 14:02)  POCT Blood Glucose.: 241 mg/dL (03-11-20 @ 08:53)  POCT Blood Glucose.: 153 mg/dL (03-11-20 @ 00:35)    POCT Blood Glucose.: 182 mg/dL (03-10-20 @ 18:44)    03-11    140  |  107  |  10  ----------------------------<  196<H>  4.6   |  18<L>  |  0.83    EGFR if : 88  EGFR if non : 76    Ca    9.9      03-11    TPro  7.2  /  Alb  3.6  /  TBili  0.1<L>  /  DBili  x   /  AST  15  /  ALT  18  /  AlkPhos  84  03-11      Thyroid Function Tests:    02-23 @ 13:25 TSH -- FreeT4 -- T3 53   02-13 @ 09:05 TSH 17.80 FreeT4 --     Hemoglobin A1C, Whole Blood: 9.0 % <H> [4.0 - 5.6] (01-28-20 @ 19:30)  Hemoglobin A1C, Whole Blood: 9.2 % <H> [4.0 - 5.6] (01-27-20 @ 17:36) Chief Complaint/Follow-up on: T2DM, h/o DKA that has resolved. Hypothyroidism    Subjective: Patient seen at bedside. Refusing exam. She appears agitated and angry. States eating 3 meals without any nausea or diarrhea. Spoke with team and per resident also very agitated but has not been refusing meds. No plans of reducing Prednisone dose at this dose.    DIET: Cons Carb no snacks    Endocrine History as per consult:  In terms of DM  -Long term history of diabetes   -At home she takes Lantus 20 units at bedtime   -Novolog 2-4 units before meals   -Blood glucose levels are 290-334   -No hypoglycemic episodes in the past   -history of cataracts, no recent optho visits  -She has not complained on neuropathy in the past     Probably did not get levothyroxine x few weeks prior to hospital admission.        MEDICATIONS  (STANDING):  ARIPiprazole  Solution 5 milliGRAM(s) Oral daily  budesonide  80 MICROgram(s)/formoterol 4.5 MICROgram(s) Inhaler 2 Puff(s) Inhalation two times a day  dextrose 5%. 1000 milliLiter(s) (50 mL/Hr) IV Continuous <Continuous>  dextrose 50% Injectable 12.5 Gram(s) IV Push once  dextrose 50% Injectable 25 Gram(s) IV Push once  dextrose 50% Injectable 25 Gram(s) IV Push once  heparin  Injectable 5000 Unit(s) SubCutaneous every 8 hours  influenza   Vaccine 0.5 milliLiter(s) IntraMuscular once  insulin glargine Injectable (LANTUS) 26 Unit(s) SubCutaneous <User Schedule>  insulin lispro (HumaLOG) corrective regimen sliding scale MOD  SubCutaneous three times a day before meals  insulin lispro (HumaLOG) corrective regimen sliding scale MOD  SubCutaneous at bedtime  insulin lispro Injectable (HumaLOG) 14 Unit(s) SubCutaneous before breakfast  insulin lispro Injectable (HumaLOG) 12 Unit(s) SubCutaneous before lunch  insulin lispro Injectable (HumaLOG) 12 Unit(s) SubCutaneous before dinner  levothyroxine 175 MICROGram(s) Oral daily  losartan 25 milliGRAM(s) Oral daily  metoprolol tartrate 25 milliGRAM(s) Oral two times a day  multivitamin/minerals/iron Oral Solution (CENTRUM) 15 milliLiter(s) Oral daily  nystatin Powder 1 Application(s) Topical every 12 hours  pantoprazole    Tablet 40 milliGRAM(s) Oral <User Schedule>  predniSONE   Tablet 15 milliGRAM(s) Oral daily  primidone 50 milliGRAM(s) Oral at bedtime  simvastatin 20 milliGRAM(s) Oral at bedtime  thiamine 100 milliGRAM(s) Oral daily    MEDICATIONS  (PRN):  acetaminophen   Tablet .. 975 milliGRAM(s) Oral every 6 hours PRN Moderate Pain (4 - 6), Severe Pain (7 - 10)  acetaminophen  Suppository .. 650 milliGRAM(s) Rectal every 6 hours PRN Temp greater or equal to 38C (100.4F)  albuterol/ipratropium for Nebulization 3 milliLiter(s) Nebulizer every 6 hours PRN Shortness of Breath and/or Wheezing  dextrose 40% Gel 15 Gram(s) Oral once PRN Blood Glucose LESS THAN 70 milliGRAM(s)/deciLiter  glucagon  Injectable 1 milliGRAM(s) IntraMuscular once PRN Glucose <70 milliGRAM(s)/deciLiter  melatonin 3 milliGRAM(s) Oral at bedtime PRN Insomnia      PHYSICAL EXAM:  VITALS: T(C): 37 (03-13-20 @ 13:53)  T(F): 98.6 (03-13-20 @ 13:53), Max: 98.6 (03-13-20 @ 13:53)  HR: 108 (03-13-20 @ 13:53) (63 - 108)  BP: 144/82 (03-13-20 @ 13:53) (139/89 - 166/95)  RR:  (18 - 18)  SpO2:  (96% - 99%)  Wt(kg): 94.3 kg    GENERAL: NAD, well-groomed, well-developed, morbidly obese  EYES: No proptosis, no injection  HEENT:  Atraumatic, Normocephalic, moist mucous membranes  THYROID: No visually inspected goiter on exam, enlarged neck girth  RESPIRATORY: No respiratory distress. No conversational dyspnea.  CARDIOVASCULAR: no peripheral edema noted on exam. No signs of volume overload noted on visual exam  GI: Noted to have a ventral hernia on visual inspection that seems reducible as more pronounced in some positions.  CUSHING'S SIGNS: no striae    POCT Blood Glucose.: 96 mg/dL (03-13-20 @ 13:36)  POCT Blood Glucose.: 164 mg/dL (03-13-20 @ 08:29) H14+2  POCT Blood Glucose.: 153 mg/dL (03-13-20 @ 06:53)    POCT Blood Glucose.: 103 mg/dL (03-12-20 @ 22:01) L26  POCT Blood Glucose.: 143 mg/dL (03-12-20 @ 16:56) H12  POCT Blood Glucose.: 245 mg/dL (03-12-20 @ 11:59) H12  POCT Blood Glucose.: 189 mg/dL (03-12-20 @ 08:47) H12+4  POCT Blood Glucose.: 138 mg/dL (03-12-20 @ 07:00)    POCT Blood Glucose.: 140 mg/dL (03-11-20 @ 21:56)  POCT Blood Glucose.: 211 mg/dL (03-11-20 @ 17:44)  POCT Blood Glucose.: 171 mg/dL (03-11-20 @ 14:02)  POCT Blood Glucose.: 241 mg/dL (03-11-20 @ 08:53)  POCT Blood Glucose.: 153 mg/dL (03-11-20 @ 00:35)    POCT Blood Glucose.: 182 mg/dL (03-10-20 @ 18:44)    03-11    140  |  107  |  10  ----------------------------<  196<H>  4.6   |  18<L>  |  0.83    EGFR if : 88  EGFR if non : 76    Ca    9.9      03-11    TPro  7.2  /  Alb  3.6  /  TBili  0.1<L>  /  DBili  x   /  AST  15  /  ALT  18  /  AlkPhos  84  03-11      Thyroid Function Tests:    02-23 @ 13:25 TSH -- FreeT4 -- T3 53   02-13 @ 09:05 TSH 17.80 FreeT4 --     Hemoglobin A1C, Whole Blood: 9.0 % <H> [4.0 - 5.6] (01-28-20 @ 19:30)  Hemoglobin A1C, Whole Blood: 9.2 % <H> [4.0 - 5.6] (01-27-20 @ 17:36)

## 2020-03-13 NOTE — PROGRESS NOTE ADULT - PROBLEM SELECTOR PLAN 3
c/w losartan  BP is elevated, goal <130/80; however, patient agitated and angry so continue to monitor for a trend before dose adjustment    Shy Calloway DO  Pager 9AM-5PM: [phone]  Pager Nights and Weekends: [phone]

## 2020-03-13 NOTE — PROGRESS NOTE ADULT - PROBLEM SELECTOR PLAN 1
-test BG AC/HS  -Continue Lantus and Humalog same doses  -C/w humalog mod dose sliding scale ac meals and hs.     -Please PAGE US if planned to adjust steroid doses  Discharge plan: basal/bolus  -Even though pt is alert and talking, she still unable to care for her DM. Per primary team plan to discharge to rehab when more stable  -f/u outpt w/Dr Saul. Will make apt closer to discharge

## 2020-03-13 NOTE — PROGRESS NOTE ADULT - SUBJECTIVE AND OBJECTIVE BOX
Parminder Cavanaugh, pgy 1  [facility]  [phone]  After 7pm; page night float    Patient is a 64y old  Female who presents with a chief complaint of change in mental status (12 Mar 2020 16:13)      SUBJECTIVE / OVERNIGHT EVENTS: Refusing to talk to me.   ADDITIONAL REVIEW OF SYSTEMS: unable to assess    MEDICATIONS  (STANDING):  ARIPiprazole  Solution 5 milliGRAM(s) Oral daily  budesonide  80 MICROgram(s)/formoterol 4.5 MICROgram(s) Inhaler 2 Puff(s) Inhalation two times a day  dextrose 5%. 1000 milliLiter(s) (50 mL/Hr) IV Continuous <Continuous>  dextrose 50% Injectable 12.5 Gram(s) IV Push once  dextrose 50% Injectable 25 Gram(s) IV Push once  dextrose 50% Injectable 25 Gram(s) IV Push once  heparin  Injectable 5000 Unit(s) SubCutaneous every 8 hours  influenza   Vaccine 0.5 milliLiter(s) IntraMuscular once  insulin glargine Injectable (LANTUS) 26 Unit(s) SubCutaneous <User Schedule>  insulin lispro (HumaLOG) corrective regimen sliding scale   SubCutaneous three times a day before meals  insulin lispro (HumaLOG) corrective regimen sliding scale   SubCutaneous at bedtime  insulin lispro Injectable (HumaLOG) 14 Unit(s) SubCutaneous before breakfast  insulin lispro Injectable (HumaLOG) 12 Unit(s) SubCutaneous before lunch  insulin lispro Injectable (HumaLOG) 12 Unit(s) SubCutaneous before dinner  levothyroxine 175 MICROGram(s) Oral daily  losartan 25 milliGRAM(s) Oral daily  metoprolol tartrate 25 milliGRAM(s) Oral two times a day  multivitamin/minerals/iron Oral Solution (CENTRUM) 15 milliLiter(s) Oral daily  nystatin Powder 1 Application(s) Topical every 12 hours  pantoprazole    Tablet 40 milliGRAM(s) Oral <User Schedule>  predniSONE   Tablet 15 milliGRAM(s) Oral daily  primidone 50 milliGRAM(s) Oral at bedtime  simvastatin 20 milliGRAM(s) Oral at bedtime  thiamine 100 milliGRAM(s) Oral daily  traMADol 25 milliGRAM(s) Oral once    MEDICATIONS  (PRN):  acetaminophen   Tablet .. 975 milliGRAM(s) Oral every 6 hours PRN Moderate Pain (4 - 6), Severe Pain (7 - 10)  acetaminophen  Suppository .. 650 milliGRAM(s) Rectal every 6 hours PRN Temp greater or equal to 38C (100.4F)  albuterol/ipratropium for Nebulization 3 milliLiter(s) Nebulizer every 6 hours PRN Shortness of Breath and/or Wheezing  dextrose 40% Gel 15 Gram(s) Oral once PRN Blood Glucose LESS THAN 70 milliGRAM(s)/deciLiter  glucagon  Injectable 1 milliGRAM(s) IntraMuscular once PRN Glucose <70 milliGRAM(s)/deciLiter  melatonin 3 milliGRAM(s) Oral at bedtime PRN Insomnia      CAPILLARY BLOOD GLUCOSE      POCT Blood Glucose.: 164 mg/dL (13 Mar 2020 08:29)  POCT Blood Glucose.: 153 mg/dL (13 Mar 2020 06:53)  POCT Blood Glucose.: 103 mg/dL (12 Mar 2020 22:01)  POCT Blood Glucose.: 143 mg/dL (12 Mar 2020 16:56)    I&O's Summary    12 Mar 2020 07:01  -  13 Mar 2020 07:00  --------------------------------------------------------  IN: 500 mL / OUT: 0 mL / NET: 500 mL        PHYSICAL EXAM:  Vital Signs Last 24 Hrs  T(C): 36.9 (12 Mar 2020 21:06), Max: 36.9 (12 Mar 2020 21:06)  T(F): 98.4 (12 Mar 2020 21:06), Max: 98.4 (12 Mar 2020 21:06)  HR: 101 (13 Mar 2020 05:07) (63 - 101)  BP: 139/89 (13 Mar 2020 05:07) (139/89 - 166/95)  BP(mean): --  RR: 18 (12 Mar 2020 21:06) (18 - 18)  uanble to evaluate.   LABS:    03-11    140  |  107  |  10  ----------------------------<  196<H>  4.6   |  18<L>  |  0.83    Ca    9.9      11 Mar 2020 17:31    TPro  7.2  /  Alb  3.6  /  TBili  0.1<L>  /  DBili  x   /  AST  15  /  ALT  18  /  AlkPhos  84  03-11                RADIOLOGY & ADDITIONAL TESTS:  Results Reviewed:   Imaging Personally Reviewed:  Electrocardiogram Personally Reviewed:    COORDINATION OF CARE:  Care Discussed with Consultants/Other Providers [Y/N]:  Prior or Outpatient Records Reviewed [Y/N]:

## 2020-03-13 NOTE — PROGRESS NOTE ADULT - ATTENDING COMMENTS
Patient seen and examined today. I agree with the above findings, assessment, and plan with the following additions and exceptions:     Acute encephalopathy, unspecified. Improved but not at baseline per family.   Likely multifactorial. Patient with severe hypothyroidism, stable SDH on imaging, recently treated UTI, adverse reaction to VPA, and mild DKA. Noted to have signs of AI at outside hospital. On chronic steroids for RA. Anterior abd wound does not show signs of infection. CSF autoimmune and paraneoplastic panels negative.   Patient intermittent refusing physical exam and labs. Will attempt daily labs.   Endocrine assistance appreciated. If patient refusing insulin, she is not to be fed.   Psych recommending Abilify standing.  HSQ ordered on 3/7. No changes in mental status.  Patient no longer endorsing SOB. Respiratory status stable.  Neuro participation in care appreciated as well.   In order for patient to safely be discharge, she will need to demonstrate a consistent compliance with her medications. Hopefully this can be achieved with use of increased dose of Abilify. Of note, patient's son Norris requesting not to provide haldol. We are working to set up a time for a family meeting to discuss family concerns regarding increasing abilify and continuing haldol. Psych and neuro participation in conversation would be greatly appreciated.     Rest of plan as above.    Dr. Avery Dugan DO  Attending Physician  Division of Hospital Medicine  [facility]  Pager:  [phone] .

## 2020-03-13 NOTE — PROGRESS NOTE ADULT - ASSESSMENT
63 y/o F w/h/o uncontrolled T2DM with unknown complications. Also h/o functional paraplegia, hypothyroidism, pulmonary hypertension, COPD, RA on chronic prednisone, SBO s/p ex lap with lysis of adhesions c/b multifocal pneumonia with AMS of unknown etiology. Pt was in catatonic state that resolved with ativan administration and is now more alert but still confused and refusing meds/treatments on and off. Mild DKA this admission that has resolved. Glycemic control remains variable without a glycemic pattern due to variable PO intake. Per resident pt eating and accepting meds at this time. No hypoglycemia.  Will continue present insulin regimen for now. Primary team aware to give IV doses of steroid and synthroid whenever pt refuses these meds via PO.     Spent 25 minutes assessing pt/labs/meds and discussing plan of care with primary team/staff

## 2020-03-13 NOTE — PROGRESS NOTE ADULT - PROBLEM SELECTOR PLAN 1
Likely underlying rapidly progressive dementia, hospital delirium.   -5mg abilify qd  -haldol PRN agitation.   -Hypothyroidism possible etiology; , free T4 0.3. Endocrine following. Now on PO 175mcg synthroid qd.    - cw prednisone 15mg.   - discussions of long term care options

## 2020-03-13 NOTE — PROGRESS NOTE ADULT - PROBLEM SELECTOR PLAN 2
-Pt currently on PO Synthroid.   -Please administer Synthroid 87.5 mcg IVP whenever pt refuses PO dose   -Check TSH, T4, FT4, FT4 by [facility], and T3 at next lab draw  -C/w multivitamins and Protonix administratio at 12 noon instead of am

## 2020-03-13 NOTE — PROGRESS NOTE ADULT - PROBLEM SELECTOR PLAN 2
#DKA  -moderate ISS  -lantus 26U qdaily  -14 units humalog for breakfast.  -12 units humalog for lunch and dinner.  -IVFs as tolerated.  -endocrine team following.  -gap closed x 2 #DKA  -moderate ISS  -lantus 26U qdaily  -14 units humalog for breakfast.  -12 units humalog for lunch and dinner.  -IVFs as tolerated.  -endocrine team following.  -gap closed

## 2020-03-14 LAB
GLUCOSE BLDC GLUCOMTR-MCNC: 123 MG/DL — HIGH (ref 70–99)
GLUCOSE BLDC GLUCOMTR-MCNC: 159 MG/DL — HIGH (ref 70–99)
GLUCOSE BLDC GLUCOMTR-MCNC: 163 MG/DL — HIGH (ref 70–99)
GLUCOSE BLDC GLUCOMTR-MCNC: 215 MG/DL — HIGH (ref 70–99)
GLUCOSE BLDC GLUCOMTR-MCNC: 65 MG/DL — LOW (ref 70–99)
GLUCOSE BLDC GLUCOMTR-MCNC: 74 MG/DL — SIGNIFICANT CHANGE UP (ref 70–99)

## 2020-03-14 PROCEDURE — 99232 SBSQ HOSP IP/OBS MODERATE 35: CPT | Mod: GC

## 2020-03-14 RX ORDER — TRAMADOL HYDROCHLORIDE 50 MG/1
50 TABLET ORAL ONCE
Refills: 0 | Status: DISCONTINUED | OUTPATIENT
Start: 2020-03-14 | End: 2020-03-14

## 2020-03-14 RX ADMIN — LOSARTAN POTASSIUM 25 MILLIGRAM(S): 100 TABLET, FILM COATED ORAL at 06:09

## 2020-03-14 RX ADMIN — HEPARIN SODIUM 5000 UNIT(S): 5000 INJECTION INTRAVENOUS; SUBCUTANEOUS at 23:14

## 2020-03-14 RX ADMIN — SIMVASTATIN 20 MILLIGRAM(S): 20 TABLET, FILM COATED ORAL at 23:12

## 2020-03-14 RX ADMIN — Medication 975 MILLIGRAM(S): at 00:10

## 2020-03-14 RX ADMIN — Medication 12 UNIT(S): at 18:45

## 2020-03-14 RX ADMIN — TRAMADOL HYDROCHLORIDE 50 MILLIGRAM(S): 50 TABLET ORAL at 15:10

## 2020-03-14 RX ADMIN — Medication 15 MILLIGRAM(S): at 06:09

## 2020-03-14 RX ADMIN — Medication 2: at 18:45

## 2020-03-14 RX ADMIN — INSULIN GLARGINE 26 UNIT(S): 100 INJECTION, SOLUTION SUBCUTANEOUS at 06:13

## 2020-03-14 RX ADMIN — PANTOPRAZOLE SODIUM 40 MILLIGRAM(S): 20 TABLET, DELAYED RELEASE ORAL at 11:58

## 2020-03-14 RX ADMIN — Medication 975 MILLIGRAM(S): at 23:14

## 2020-03-14 RX ADMIN — HEPARIN SODIUM 5000 UNIT(S): 5000 INJECTION INTRAVENOUS; SUBCUTANEOUS at 06:09

## 2020-03-14 RX ADMIN — Medication 14 UNIT(S): at 08:39

## 2020-03-14 RX ADMIN — TRAMADOL HYDROCHLORIDE 50 MILLIGRAM(S): 50 TABLET ORAL at 14:35

## 2020-03-14 RX ADMIN — NYSTATIN CREAM 1 APPLICATION(S): 100000 CREAM TOPICAL at 06:12

## 2020-03-14 RX ADMIN — Medication 25 MILLIGRAM(S): at 18:43

## 2020-03-14 RX ADMIN — BUDESONIDE AND FORMOTEROL FUMARATE DIHYDRATE 2 PUFF(S): 160; 4.5 AEROSOL RESPIRATORY (INHALATION) at 17:47

## 2020-03-14 RX ADMIN — Medication 2: at 14:33

## 2020-03-14 RX ADMIN — HEPARIN SODIUM 5000 UNIT(S): 5000 INJECTION INTRAVENOUS; SUBCUTANEOUS at 14:34

## 2020-03-14 RX ADMIN — Medication 4: at 08:38

## 2020-03-14 RX ADMIN — PRIMIDONE 50 MILLIGRAM(S): 250 TABLET ORAL at 23:13

## 2020-03-14 RX ADMIN — NYSTATIN CREAM 1 APPLICATION(S): 100000 CREAM TOPICAL at 18:41

## 2020-03-14 RX ADMIN — ARIPIPRAZOLE 5 MILLIGRAM(S): 15 TABLET ORAL at 11:58

## 2020-03-14 RX ADMIN — Medication 175 MICROGRAM(S): at 06:09

## 2020-03-14 RX ADMIN — Medication 12 UNIT(S): at 14:34

## 2020-03-14 RX ADMIN — Medication 100 MILLIGRAM(S): at 12:00

## 2020-03-14 RX ADMIN — Medication 25 MILLIGRAM(S): at 06:09

## 2020-03-14 NOTE — PROGRESS NOTE ADULT - ATTENDING COMMENTS
Patient seen and examined today. I agree with the above findings, assessment, and plan with the following additions and exceptions:     Acute encephalopathy, unspecified. Improved but not at baseline per family.   Likely multifactorial. Patient with severe hypothyroidism, stable SDH on imaging, recently treated UTI, adverse reaction to VPA, and mild DKA. Noted to have signs of AI at outside hospital. On chronic steroids for RA. Anterior abd wound does not show signs of infection. CSF autoimmune and paraneoplastic panels negative.   Patient intermittent refusing physical exam and labs. Will attempt daily labs.   Endocrine assistance appreciated. If patient refusing insulin, she is not to be fed.   Psych recommending Abilify standing.  If patient refusing synthroid or prednisone PO, intravenous formulations should be given.   HSQ ordered on 3/7. No changes in mental status.  Patient no longer endorsing SOB. Respiratory status stable.  Neuro participation in care appreciated as well.   In order for patient to safely be discharge, she will need to demonstrate a consistent compliance with her medications. Hopefully this can be achieved with use of increased dose of Abilify. Of note, patient's son Norris requesting not to provide haldol. We are working to set up a time for a family meeting to discuss family concerns regarding increasing abilify and continuing haldol. Psych and neuro participation in conversation would be greatly appreciated.     Rest of plan as above.    Dr. Avery Dugan DO  Attending Physician  Division of Hospital Medicine  [facility]  Pager:  [phone] .

## 2020-03-14 NOTE — PROGRESS NOTE ADULT - PROBLEM SELECTOR PLAN 1
Likely underlying rapidly progressive dementia, hospital delirium.   -5mg abilify qd  -haldol PRN agitation.   -Hypothyroidism possible etiology; , free T4 0.3. Endocrine following. Now on PO 175mcg synthroid qd. Plan to recheck thyroid levels today including TSH, free T4  - cw prednisone 15mg.   - discussions of long term care options

## 2020-03-14 NOTE — PROGRESS NOTE ADULT - PROBLEM SELECTOR PLAN 2
#DKA  -moderate ISS  -lantus 26U qdaily  -14 units humalog for breakfast.  -12 units humalog for lunch and dinner.  -IVFs as tolerated.  -endocrine team following.  -gap closed

## 2020-03-14 NOTE — PROGRESS NOTE ADULT - SUBJECTIVE AND OBJECTIVE BOX
Parminder Cavanaugh, pgy 1  [facility] CMB  [phone]  After 7pm; page night float    Patient is a 64y old  Female who presents with a chief complaint of change in mental status (13 Mar 2020 15:03)      SUBJECTIVE / OVERNIGHT EVENTS: Pt seen and examined at bedside. Cursing at staff. Had long discussion about her hospitalization and need to check her thyroid levels this morning, She refused and told me to leave the room.     ADDITIONAL REVIEW OF SYSTEMS:  + abdominal pain   no chest pain, no shortness of breath  unable to fully assess due to patient's agitation     MEDICATIONS  (STANDING):  ARIPiprazole  Solution 5 milliGRAM(s) Oral daily  budesonide  80 MICROgram(s)/formoterol 4.5 MICROgram(s) Inhaler 2 Puff(s) Inhalation two times a day  dextrose 5%. 1000 milliLiter(s) (50 mL/Hr) IV Continuous <Continuous>  dextrose 50% Injectable 12.5 Gram(s) IV Push once  dextrose 50% Injectable 25 Gram(s) IV Push once  dextrose 50% Injectable 25 Gram(s) IV Push once  heparin  Injectable 5000 Unit(s) SubCutaneous every 8 hours  influenza   Vaccine 0.5 milliLiter(s) IntraMuscular once  insulin glargine Injectable (LANTUS) 26 Unit(s) SubCutaneous <User Schedule>  insulin lispro (HumaLOG) corrective regimen sliding scale   SubCutaneous three times a day before meals  insulin lispro (HumaLOG) corrective regimen sliding scale   SubCutaneous at bedtime  insulin lispro Injectable (HumaLOG) 14 Unit(s) SubCutaneous before breakfast  insulin lispro Injectable (HumaLOG) 12 Unit(s) SubCutaneous before lunch  insulin lispro Injectable (HumaLOG) 12 Unit(s) SubCutaneous before dinner  levothyroxine 175 MICROGram(s) Oral daily  losartan 25 milliGRAM(s) Oral daily  metoprolol tartrate 25 milliGRAM(s) Oral two times a day  multivitamin/minerals/iron Oral Solution (CENTRUM) 15 milliLiter(s) Oral daily  nystatin Powder 1 Application(s) Topical every 12 hours  pantoprazole    Tablet 40 milliGRAM(s) Oral <User Schedule>  predniSONE   Tablet 15 milliGRAM(s) Oral daily  primidone 50 milliGRAM(s) Oral at bedtime  simvastatin 20 milliGRAM(s) Oral at bedtime  thiamine 100 milliGRAM(s) Oral daily    MEDICATIONS  (PRN):  acetaminophen   Tablet .. 975 milliGRAM(s) Oral every 6 hours PRN Moderate Pain (4 - 6), Severe Pain (7 - 10)  acetaminophen  Suppository .. 650 milliGRAM(s) Rectal every 6 hours PRN Temp greater or equal to 38C (100.4F)  albuterol/ipratropium for Nebulization 3 milliLiter(s) Nebulizer every 6 hours PRN Shortness of Breath and/or Wheezing  dextrose 40% Gel 15 Gram(s) Oral once PRN Blood Glucose LESS THAN 70 milliGRAM(s)/deciLiter  glucagon  Injectable 1 milliGRAM(s) IntraMuscular once PRN Glucose <70 milliGRAM(s)/deciLiter  melatonin 3 milliGRAM(s) Oral at bedtime PRN Insomnia      CAPILLARY BLOOD GLUCOSE      POCT Blood Glucose.: 215 mg/dL (14 Mar 2020 08:25)  POCT Blood Glucose.: 123 mg/dL (14 Mar 2020 06:08)  POCT Blood Glucose.: 187 mg/dL (13 Mar 2020 22:25)  POCT Blood Glucose.: 152 mg/dL (13 Mar 2020 17:25)  POCT Blood Glucose.: 96 mg/dL (13 Mar 2020 13:36)    I&O's Summary    13 Mar 2020 07:01  -  14 Mar 2020 07:00  --------------------------------------------------------  IN: 600 mL / OUT: 0 mL / NET: 600 mL        PHYSICAL EXAM:  Vital Signs Last 24 Hrs  T(C): 37.1 (14 Mar 2020 06:05), Max: 37.1 (14 Mar 2020 06:05)  T(F): 98.7 (14 Mar 2020 06:05), Max: 98.7 (14 Mar 2020 06:05)  HR: 86 (14 Mar 2020 06:05) (86 - 108)  BP: 134/88 (14 Mar 2020 06:05) (134/88 - 144/82)  BP(mean): --  RR: 18 (14 Mar 2020 06:05) (18 - 18)  SpO2: 95% (14 Mar 2020 06:05) (95% - 99%)  CONSTITUTIONAL: brief physical exam allowed by patient   EYES: PERRLA; conjunctiva and sclera clear  RESPIRATORY: Normal respiratory effort; lungs are clear to auscultation bilaterally  CARDIOVASCULAR: Regular rate and rhythm, normal S1 and S2, no murmur/rub/gallop; No lower extremity edema; Peripheral pulses are 2+ bilaterally  ABDOMEN: tender to palpation left side of abdomen, normoactive bowel sounds, no rebound/guarding; No hepatosplenomegaly      LABS:                      RADIOLOGY & ADDITIONAL TESTS:  Results Reviewed:   Imaging Personally Reviewed:  Electrocardiogram Personally Reviewed:    COORDINATION OF CARE:  Care Discussed with Consultants/Other Providers [Y/N]:  Prior or Outpatient Records Reviewed [Y/N]: Parminder Cavanaugh, pgy 1  [facility] CMB  [phone]  After 7pm; page night float    Patient is a 64y old  Female who presents with a chief complaint of change in mental status (13 Mar 2020 15:03)      SUBJECTIVE / OVERNIGHT EVENTS: Pt seen and examined at bedside. Cursing at staff. Had long discussion about her hospitalization and need to check her thyroid levels this morning, She refused and told me to leave the room.     ADDITIONAL REVIEW OF SYSTEMS:   no chest pain, no shortness of breath  unable to fully assess due to patient's agitation     MEDICATIONS  (STANDING):  ARIPiprazole  Solution 5 milliGRAM(s) Oral daily  budesonide  80 MICROgram(s)/formoterol 4.5 MICROgram(s) Inhaler 2 Puff(s) Inhalation two times a day  dextrose 5%. 1000 milliLiter(s) (50 mL/Hr) IV Continuous <Continuous>  dextrose 50% Injectable 12.5 Gram(s) IV Push once  dextrose 50% Injectable 25 Gram(s) IV Push once  dextrose 50% Injectable 25 Gram(s) IV Push once  heparin  Injectable 5000 Unit(s) SubCutaneous every 8 hours  influenza   Vaccine 0.5 milliLiter(s) IntraMuscular once  insulin glargine Injectable (LANTUS) 26 Unit(s) SubCutaneous <User Schedule>  insulin lispro (HumaLOG) corrective regimen sliding scale   SubCutaneous three times a day before meals  insulin lispro (HumaLOG) corrective regimen sliding scale   SubCutaneous at bedtime  insulin lispro Injectable (HumaLOG) 14 Unit(s) SubCutaneous before breakfast  insulin lispro Injectable (HumaLOG) 12 Unit(s) SubCutaneous before lunch  insulin lispro Injectable (HumaLOG) 12 Unit(s) SubCutaneous before dinner  levothyroxine 175 MICROGram(s) Oral daily  losartan 25 milliGRAM(s) Oral daily  metoprolol tartrate 25 milliGRAM(s) Oral two times a day  multivitamin/minerals/iron Oral Solution (CENTRUM) 15 milliLiter(s) Oral daily  nystatin Powder 1 Application(s) Topical every 12 hours  pantoprazole    Tablet 40 milliGRAM(s) Oral <User Schedule>  predniSONE   Tablet 15 milliGRAM(s) Oral daily  primidone 50 milliGRAM(s) Oral at bedtime  simvastatin 20 milliGRAM(s) Oral at bedtime  thiamine 100 milliGRAM(s) Oral daily    MEDICATIONS  (PRN):  acetaminophen   Tablet .. 975 milliGRAM(s) Oral every 6 hours PRN Moderate Pain (4 - 6), Severe Pain (7 - 10)  acetaminophen  Suppository .. 650 milliGRAM(s) Rectal every 6 hours PRN Temp greater or equal to 38C (100.4F)  albuterol/ipratropium for Nebulization 3 milliLiter(s) Nebulizer every 6 hours PRN Shortness of Breath and/or Wheezing  dextrose 40% Gel 15 Gram(s) Oral once PRN Blood Glucose LESS THAN 70 milliGRAM(s)/deciLiter  glucagon  Injectable 1 milliGRAM(s) IntraMuscular once PRN Glucose <70 milliGRAM(s)/deciLiter  melatonin 3 milliGRAM(s) Oral at bedtime PRN Insomnia      CAPILLARY BLOOD GLUCOSE      POCT Blood Glucose.: 215 mg/dL (14 Mar 2020 08:25)  POCT Blood Glucose.: 123 mg/dL (14 Mar 2020 06:08)  POCT Blood Glucose.: 187 mg/dL (13 Mar 2020 22:25)  POCT Blood Glucose.: 152 mg/dL (13 Mar 2020 17:25)  POCT Blood Glucose.: 96 mg/dL (13 Mar 2020 13:36)    I&O's Summary    13 Mar 2020 07:01  -  14 Mar 2020 07:00  --------------------------------------------------------  IN: 600 mL / OUT: 0 mL / NET: 600 mL        PHYSICAL EXAM:  Vital Signs Last 24 Hrs  T(C): 37.1 (14 Mar 2020 06:05), Max: 37.1 (14 Mar 2020 06:05)  T(F): 98.7 (14 Mar 2020 06:05), Max: 98.7 (14 Mar 2020 06:05)  HR: 86 (14 Mar 2020 06:05) (86 - 108)  BP: 134/88 (14 Mar 2020 06:05) (134/88 - 144/82)  BP(mean): --  RR: 18 (14 Mar 2020 06:05) (18 - 18)  SpO2: 95% (14 Mar 2020 06:05) (95% - 99%)  CONSTITUTIONAL: brief physical exam allowed by patient   EYES: PERRLA; conjunctiva and sclera clear  RESPIRATORY: Normal respiratory effort; lungs are clear to auscultation bilaterally  CARDIOVASCULAR: Regular rate and rhythm, normal S1 and S2, no murmur/rub/gallop; No lower extremity edema; Peripheral pulses are 2+ bilaterally  ABDOMEN: tender to palpation left side of abdomen, normoactive bowel sounds, no rebound/guarding; No hepatosplenomegaly      LABS:      REFUSED AGAIN.                 RADIOLOGY & ADDITIONAL TESTS:  Results Reviewed:   Imaging Personally Reviewed:  Electrocardiogram Personally Reviewed:    COORDINATION OF CARE:  Care Discussed with Consultants/Other Providers [Y/N]:  Prior or Outpatient Records Reviewed [Y/N]:

## 2020-03-15 LAB
GLUCOSE BLDC GLUCOMTR-MCNC: 141 MG/DL — HIGH (ref 70–99)
GLUCOSE BLDC GLUCOMTR-MCNC: 175 MG/DL — HIGH (ref 70–99)
GLUCOSE BLDC GLUCOMTR-MCNC: 181 MG/DL — HIGH (ref 70–99)
GLUCOSE BLDC GLUCOMTR-MCNC: 262 MG/DL — HIGH (ref 70–99)

## 2020-03-15 PROCEDURE — 99232 SBSQ HOSP IP/OBS MODERATE 35: CPT | Mod: GC

## 2020-03-15 RX ORDER — TRAMADOL HYDROCHLORIDE 50 MG/1
50 TABLET ORAL ONCE
Refills: 0 | Status: DISCONTINUED | OUTPATIENT
Start: 2020-03-15 | End: 2020-03-15

## 2020-03-15 RX ORDER — INSULIN LISPRO 100/ML
10 VIAL (ML) SUBCUTANEOUS
Refills: 0 | Status: DISCONTINUED | OUTPATIENT
Start: 2020-03-15 | End: 2020-03-27

## 2020-03-15 RX ADMIN — NYSTATIN CREAM 1 APPLICATION(S): 100000 CREAM TOPICAL at 18:08

## 2020-03-15 RX ADMIN — Medication 6: at 13:39

## 2020-03-15 RX ADMIN — HEPARIN SODIUM 5000 UNIT(S): 5000 INJECTION INTRAVENOUS; SUBCUTANEOUS at 11:54

## 2020-03-15 RX ADMIN — BUDESONIDE AND FORMOTEROL FUMARATE DIHYDRATE 2 PUFF(S): 160; 4.5 AEROSOL RESPIRATORY (INHALATION) at 17:20

## 2020-03-15 RX ADMIN — Medication 2: at 18:07

## 2020-03-15 RX ADMIN — PRIMIDONE 50 MILLIGRAM(S): 250 TABLET ORAL at 22:41

## 2020-03-15 RX ADMIN — SIMVASTATIN 20 MILLIGRAM(S): 20 TABLET, FILM COATED ORAL at 22:41

## 2020-03-15 RX ADMIN — LOSARTAN POTASSIUM 25 MILLIGRAM(S): 100 TABLET, FILM COATED ORAL at 07:57

## 2020-03-15 RX ADMIN — PANTOPRAZOLE SODIUM 40 MILLIGRAM(S): 20 TABLET, DELAYED RELEASE ORAL at 11:54

## 2020-03-15 RX ADMIN — INSULIN GLARGINE 26 UNIT(S): 100 INJECTION, SOLUTION SUBCUTANEOUS at 07:56

## 2020-03-15 RX ADMIN — NYSTATIN CREAM 1 APPLICATION(S): 100000 CREAM TOPICAL at 07:58

## 2020-03-15 RX ADMIN — Medication 12 UNIT(S): at 13:40

## 2020-03-15 RX ADMIN — HEPARIN SODIUM 5000 UNIT(S): 5000 INJECTION INTRAVENOUS; SUBCUTANEOUS at 18:07

## 2020-03-15 RX ADMIN — Medication 25 MILLIGRAM(S): at 18:06

## 2020-03-15 RX ADMIN — Medication 100 MILLIGRAM(S): at 11:54

## 2020-03-15 RX ADMIN — Medication 15 MILLIGRAM(S): at 07:59

## 2020-03-15 RX ADMIN — Medication 25 MILLIGRAM(S): at 07:58

## 2020-03-15 RX ADMIN — Medication 175 MICROGRAM(S): at 07:57

## 2020-03-15 RX ADMIN — TRAMADOL HYDROCHLORIDE 50 MILLIGRAM(S): 50 TABLET ORAL at 18:06

## 2020-03-15 NOTE — PROGRESS NOTE ADULT - PROBLEM SELECTOR PLAN 2
#DKA- resolved  - continue with moderate ISS  -lantus 26U qdaily  -14 units humalog for breakfast.  -12 units humalog for lunch and dinner.  -endocrine team following.  -patient should cannot eat without pre meal insulin as she may go into DKA #DKA- resolved  - continue with moderate ISS  -lantus 26U qdaily  -14 units humalog for breakfast.  -12 units humalog for lunch   -10 units humalog for dinner.   -endocrine team following.  -patient should cannot eat without pre meal insulin as she may go into DKA

## 2020-03-15 NOTE — PROGRESS NOTE ADULT - PROBLEM SELECTOR PLAN 10
- DVT ppx: HSQ q8hrs   - Diet: speech soft diet  - Dispo: [facility] pending repeat PT eval vs home with home care     Transitions of Care Status:  1.  Name of PCP:  Kristie Nails MD (PCP)  2.  PCP Contacted on Admission: [ ] Y    [x] N    3.  PCP contacted at Discharge: [ ] Y    [ ] N    [ ] N/A  4.  Post-Discharge Appointment Date and Location:  5.  Summary of Handoff given to PCP:

## 2020-03-15 NOTE — CHART NOTE - NSCHARTNOTEFT_GEN_A_CORE
Patient unable to be seen at bedside. Chart and Bg reviewed. Noted with BG 60s pre-bedtime. Continue with current dose of lantus. Will decrease humalog to 14-12-10.    If further questions, can call endocrine at [phone]

## 2020-03-15 NOTE — PROGRESS NOTE ADULT - SUBJECTIVE AND OBJECTIVE BOX
Ketty Vázquez  PGY-3  [facility] [phone]      Patient is a 64y old  Female who presents with a chief complaint of change in mental status (14 Mar 2020 08:41)      SUBJECTIVE / OVERNIGHT EVENTS:  Patient seen and examined at bedside. No acute events overnight. Patient endorses no acute complaint. Patient states that she feels fine and declined physical examination saying "you ain't doing that."    MEDICATIONS  (STANDING):  ARIPiprazole  Solution 5 milliGRAM(s) Oral daily  budesonide  80 MICROgram(s)/formoterol 4.5 MICROgram(s) Inhaler 2 Puff(s) Inhalation two times a day  dextrose 5%. 1000 milliLiter(s) (50 mL/Hr) IV Continuous <Continuous>  dextrose 50% Injectable 12.5 Gram(s) IV Push once  dextrose 50% Injectable 25 Gram(s) IV Push once  dextrose 50% Injectable 25 Gram(s) IV Push once  heparin  Injectable 5000 Unit(s) SubCutaneous every 8 hours  influenza   Vaccine 0.5 milliLiter(s) IntraMuscular once  insulin glargine Injectable (LANTUS) 26 Unit(s) SubCutaneous <User Schedule>  insulin lispro (HumaLOG) corrective regimen sliding scale   SubCutaneous three times a day before meals  insulin lispro (HumaLOG) corrective regimen sliding scale   SubCutaneous at bedtime  insulin lispro Injectable (HumaLOG) 14 Unit(s) SubCutaneous before breakfast  insulin lispro Injectable (HumaLOG) 12 Unit(s) SubCutaneous before lunch  insulin lispro Injectable (HumaLOG) 12 Unit(s) SubCutaneous before dinner  levothyroxine 175 MICROGram(s) Oral daily  losartan 25 milliGRAM(s) Oral daily  metoprolol tartrate 25 milliGRAM(s) Oral two times a day  multivitamin/minerals/iron Oral Solution (CENTRUM) 15 milliLiter(s) Oral daily  nystatin Powder 1 Application(s) Topical every 12 hours  pantoprazole    Tablet 40 milliGRAM(s) Oral <User Schedule>  predniSONE   Tablet 15 milliGRAM(s) Oral daily  primidone 50 milliGRAM(s) Oral at bedtime  simvastatin 20 milliGRAM(s) Oral at bedtime  thiamine 100 milliGRAM(s) Oral daily    MEDICATIONS  (PRN):  acetaminophen   Tablet .. 975 milliGRAM(s) Oral every 6 hours PRN Moderate Pain (4 - 6), Severe Pain (7 - 10)  acetaminophen  Suppository .. 650 milliGRAM(s) Rectal every 6 hours PRN Temp greater or equal to 38C (100.4F)  albuterol/ipratropium for Nebulization 3 milliLiter(s) Nebulizer every 6 hours PRN Shortness of Breath and/or Wheezing  dextrose 40% Gel 15 Gram(s) Oral once PRN Blood Glucose LESS THAN 70 milliGRAM(s)/deciLiter  glucagon  Injectable 1 milliGRAM(s) IntraMuscular once PRN Glucose <70 milliGRAM(s)/deciLiter  melatonin 3 milliGRAM(s) Oral at bedtime PRN Insomnia      Vital Signs Last 24 Hrs  T(C): 36.9 (15 Mar 2020 07:49), Max: 36.9 (14 Mar 2020 14:49)  T(F): 98.4 (15 Mar 2020 07:49), Max: 98.5 (14 Mar 2020 14:49)  HR: 95 (15 Mar 2020 07:49) (74 - 100)  BP: 131/74 (15 Mar 2020 07:49) (129/78 - 142/91)  BP(mean): --  RR: 18 (15 Mar 2020 07:49) (18 - 18)  SpO2: 96% (15 Mar 2020 07:49) (94% - 98%)  CAPILLARY BLOOD GLUCOSE      POCT Blood Glucose.: 141 mg/dL (15 Mar 2020 07:47)  POCT Blood Glucose.: 74 mg/dL (14 Mar 2020 22:56)  POCT Blood Glucose.: 65 mg/dL (14 Mar 2020 22:25)  POCT Blood Glucose.: 159 mg/dL (14 Mar 2020 18:36)  POCT Blood Glucose.: 163 mg/dL (14 Mar 2020 13:41)    I&O's Summary    14 Mar 2020 07:01  -  15 Mar 2020 07:00  --------------------------------------------------------  IN: 480 mL / OUT: 0 mL / NET: 480 mL        PHYSICAL EXAM:  Unable to perform    LABS:    WBC Trend: 9.86<--, 10.80<--        Creatinine Trend: 0.83<--, 0.82<--, 0.94<--, 1.01<--, 0.78<--, 0.85<--              RADIOLOGY & ADDITIONAL TESTS:    Imaging Personally Reviewed:    Consultant(s) Notes Reviewed:      Care Discussed with Consultants/Other Providers: Ketty Vázquez  PGY-3  [facility] [phone]      Patient is a 64y old  Female who presents with a chief complaint of change in mental status (14 Mar 2020 08:41)      SUBJECTIVE / OVERNIGHT EVENTS:  Patient seen and examined at bedside. No acute events overnight. Patient endorses no acute complaint. Patient states that she feels fine and declined physical examination saying "you ain't doing that."    MEDICATIONS  (STANDING):  ARIPiprazole  Solution 5 milliGRAM(s) Oral daily  budesonide  80 MICROgram(s)/formoterol 4.5 MICROgram(s) Inhaler 2 Puff(s) Inhalation two times a day  dextrose 5%. 1000 milliLiter(s) (50 mL/Hr) IV Continuous <Continuous>  dextrose 50% Injectable 12.5 Gram(s) IV Push once  dextrose 50% Injectable 25 Gram(s) IV Push once  dextrose 50% Injectable 25 Gram(s) IV Push once  heparin  Injectable 5000 Unit(s) SubCutaneous every 8 hours  influenza   Vaccine 0.5 milliLiter(s) IntraMuscular once  insulin glargine Injectable (LANTUS) 26 Unit(s) SubCutaneous <User Schedule>  insulin lispro (HumaLOG) corrective regimen sliding scale   SubCutaneous three times a day before meals  insulin lispro (HumaLOG) corrective regimen sliding scale   SubCutaneous at bedtime  insulin lispro Injectable (HumaLOG) 14 Unit(s) SubCutaneous before breakfast  insulin lispro Injectable (HumaLOG) 12 Unit(s) SubCutaneous before lunch  insulin lispro Injectable (HumaLOG) 12 Unit(s) SubCutaneous before dinner  levothyroxine 175 MICROGram(s) Oral daily  losartan 25 milliGRAM(s) Oral daily  metoprolol tartrate 25 milliGRAM(s) Oral two times a day  multivitamin/minerals/iron Oral Solution (CENTRUM) 15 milliLiter(s) Oral daily  nystatin Powder 1 Application(s) Topical every 12 hours  pantoprazole    Tablet 40 milliGRAM(s) Oral <User Schedule>  predniSONE   Tablet 15 milliGRAM(s) Oral daily  primidone 50 milliGRAM(s) Oral at bedtime  simvastatin 20 milliGRAM(s) Oral at bedtime  thiamine 100 milliGRAM(s) Oral daily    MEDICATIONS  (PRN):  acetaminophen   Tablet .. 975 milliGRAM(s) Oral every 6 hours PRN Moderate Pain (4 - 6), Severe Pain (7 - 10)  acetaminophen  Suppository .. 650 milliGRAM(s) Rectal every 6 hours PRN Temp greater or equal to 38C (100.4F)  albuterol/ipratropium for Nebulization 3 milliLiter(s) Nebulizer every 6 hours PRN Shortness of Breath and/or Wheezing  dextrose 40% Gel 15 Gram(s) Oral once PRN Blood Glucose LESS THAN 70 milliGRAM(s)/deciLiter  glucagon  Injectable 1 milliGRAM(s) IntraMuscular once PRN Glucose <70 milliGRAM(s)/deciLiter  melatonin 3 milliGRAM(s) Oral at bedtime PRN Insomnia      Vital Signs Last 24 Hrs  T(C): 36.9 (15 Mar 2020 07:49), Max: 36.9 (14 Mar 2020 14:49)  T(F): 98.4 (15 Mar 2020 07:49), Max: 98.5 (14 Mar 2020 14:49)  HR: 95 (15 Mar 2020 07:49) (74 - 100)  BP: 131/74 (15 Mar 2020 07:49) (129/78 - 142/91)  BP(mean): --  RR: 18 (15 Mar 2020 07:49) (18 - 18)  SpO2: 96% (15 Mar 2020 07:49) (94% - 98%)  CAPILLARY BLOOD GLUCOSE      POCT Blood Glucose.: 141 mg/dL (15 Mar 2020 07:47)  POCT Blood Glucose.: 74 mg/dL (14 Mar 2020 22:56)  POCT Blood Glucose.: 65 mg/dL (14 Mar 2020 22:25)  POCT Blood Glucose.: 159 mg/dL (14 Mar 2020 18:36)  POCT Blood Glucose.: 163 mg/dL (14 Mar 2020 13:41)    I&O's Summary    14 Mar 2020 07:01  -  15 Mar 2020 07:00  --------------------------------------------------------  IN: 480 mL / OUT: 0 mL / NET: 480 mL        PHYSICAL EXAM:  Unable to perform    LABS:    WBC Trend: 9.86<--, 10.80<--        Creatinine Trend: 0.83<--, 0.82<--, 0.94<--, 1.01<--, 0.78<--, 0.85<--              RADIOLOGY & ADDITIONAL TESTS:    Imaging Personally Reviewed:    Consultant(s) Notes Reviewed:      Care Discussed with Consultants/Other Providers: YES - ENDO

## 2020-03-15 NOTE — PROGRESS NOTE ADULT - ATTENDING COMMENTS
Patient seen and examined today. I agree with the above findings, assessment, and plan with the following additions and exceptions:     Acute encephalopathy, unspecified. Improved but not at baseline per family.   Likely multifactorial. Patient with severe hypothyroidism, stable SDH on imaging, recently treated UTI, adverse reaction to VPA, and mild DKA. Noted to have signs of AI at outside hospital. On chronic steroids for RA. Anterior abd wound does not show signs of infection. CSF autoimmune and paraneoplastic panels negative.   Patient intermittent refusing physical exam and labs. Will attempt daily labs.   Endocrine assistance appreciated. If patient refusing insulin, she is not to be fed.   Psych recommending Abilify standing.  If patient refusing synthroid or prednisone PO, intravenous formulations should be given.   HSQ ordered on 3/7. No changes in mental status.  Patient no longer endorsing SOB. Respiratory status stable.  Neuro participation in care appreciated as well.   In order for patient to safely be discharge, she will need to demonstrate a consistent compliance with her medications. Hopefully this can be achieved with use of increased dose of Abilify. Of note, patient's son Norris requesting not to provide haldol. We are working to set up a time for a family meeting to discuss family concerns regarding increasing abilify and continuing haldol. Psych and neuro participation in conversation would be greatly appreciated.     Lastly, it may be better to disposition Ms. Morrow to a [facility] as they have additional ancillary support who can assist in medical management.    Rest of plan as above.    Dr. Avery Dugan DO  Attending Physician  Division of Hospital Medicine  [facility]  Pager:  [phone] .

## 2020-03-15 NOTE — PROGRESS NOTE ADULT - PROBLEM SELECTOR PLAN 1
Likely underlying rapidly progressive dementia, vs hospital delirium vs sequelae from stroke/SDH.  Likely patient's new mental status  -5mg abilify qd per psych  -holding off on Haldol per family request  -Hypothyroidism possible etiology; , free T4 0.3. Endocrine following. Now on PO 175mcg synthroid qd. Plan to recheck thyroid levels including TSH, free T4 when patient collaborates with blood draws   - cw prednisone 15mg.   - consider reconsulting ethics  this week, as unclear who is the true decision maker for this patient  - consider scheduling family meeting with both psych and neurology involvement to address patient's final treatment/medication plan and disposition ([facility] vs home with home care)

## 2020-03-16 LAB
GLUCOSE BLDC GLUCOMTR-MCNC: 149 MG/DL — HIGH (ref 70–99)
GLUCOSE BLDC GLUCOMTR-MCNC: 209 MG/DL — HIGH (ref 70–99)
GLUCOSE BLDC GLUCOMTR-MCNC: 222 MG/DL — HIGH (ref 70–99)

## 2020-03-16 PROCEDURE — 99233 SBSQ HOSP IP/OBS HIGH 50: CPT | Mod: GC

## 2020-03-16 PROCEDURE — 99233 SBSQ HOSP IP/OBS HIGH 50: CPT

## 2020-03-16 RX ORDER — OLANZAPINE 15 MG/1
2.5 TABLET, FILM COATED ORAL
Refills: 0 | Status: DISCONTINUED | OUTPATIENT
Start: 2020-03-16 | End: 2020-03-27

## 2020-03-16 RX ORDER — OLANZAPINE 15 MG/1
2.5 TABLET, FILM COATED ORAL
Refills: 0 | Status: DISCONTINUED | OUTPATIENT
Start: 2020-03-16 | End: 2020-03-16

## 2020-03-16 RX ADMIN — Medication 175 MICROGRAM(S): at 06:20

## 2020-03-16 RX ADMIN — INSULIN GLARGINE 26 UNIT(S): 100 INJECTION, SOLUTION SUBCUTANEOUS at 08:36

## 2020-03-16 RX ADMIN — BUDESONIDE AND FORMOTEROL FUMARATE DIHYDRATE 2 PUFF(S): 160; 4.5 AEROSOL RESPIRATORY (INHALATION) at 18:31

## 2020-03-16 RX ADMIN — Medication 975 MILLIGRAM(S): at 20:40

## 2020-03-16 RX ADMIN — SIMVASTATIN 20 MILLIGRAM(S): 20 TABLET, FILM COATED ORAL at 21:55

## 2020-03-16 RX ADMIN — HEPARIN SODIUM 5000 UNIT(S): 5000 INJECTION INTRAVENOUS; SUBCUTANEOUS at 17:18

## 2020-03-16 RX ADMIN — Medication 975 MILLIGRAM(S): at 21:16

## 2020-03-16 RX ADMIN — PRIMIDONE 50 MILLIGRAM(S): 250 TABLET ORAL at 21:53

## 2020-03-16 RX ADMIN — Medication 25 MILLIGRAM(S): at 06:20

## 2020-03-16 RX ADMIN — Medication 14 UNIT(S): at 08:37

## 2020-03-16 RX ADMIN — NYSTATIN CREAM 1 APPLICATION(S): 100000 CREAM TOPICAL at 06:23

## 2020-03-16 RX ADMIN — HEPARIN SODIUM 5000 UNIT(S): 5000 INJECTION INTRAVENOUS; SUBCUTANEOUS at 02:49

## 2020-03-16 RX ADMIN — Medication 4: at 17:17

## 2020-03-16 RX ADMIN — PANTOPRAZOLE SODIUM 40 MILLIGRAM(S): 20 TABLET, DELAYED RELEASE ORAL at 11:19

## 2020-03-16 RX ADMIN — OLANZAPINE 2.5 MILLIGRAM(S): 15 TABLET, FILM COATED ORAL at 18:50

## 2020-03-16 RX ADMIN — NYSTATIN CREAM 1 APPLICATION(S): 100000 CREAM TOPICAL at 18:51

## 2020-03-16 RX ADMIN — HEPARIN SODIUM 5000 UNIT(S): 5000 INJECTION INTRAVENOUS; SUBCUTANEOUS at 11:21

## 2020-03-16 RX ADMIN — Medication 4: at 08:37

## 2020-03-16 RX ADMIN — Medication 10 UNIT(S): at 17:17

## 2020-03-16 RX ADMIN — Medication 25 MILLIGRAM(S): at 17:15

## 2020-03-16 RX ADMIN — LOSARTAN POTASSIUM 25 MILLIGRAM(S): 100 TABLET, FILM COATED ORAL at 06:22

## 2020-03-16 RX ADMIN — Medication 100 MILLIGRAM(S): at 11:19

## 2020-03-16 RX ADMIN — Medication 15 MILLIGRAM(S): at 06:20

## 2020-03-16 NOTE — PROGRESS NOTE ADULT - ASSESSMENT
64F w/ a PMHx arthritis, hypothyroidism, pulmonary HTN, DM on insulin, HTN, COPD/CHRIS on noctournal CPAP and 3L home), RA on chronic prednisone, chronic tremors, SBO s/p ex lap with lysis of adhesions c/b evisceration p/w agitation 3 weeks of unclear etiology, found to be significantly hypothyroid (), MRI brain found to have small right sided SDH, hospital course c/b catatonia improved with Ativan. Course recently c/b DKA. 64F w/ a PMHx arthritis, hypothyroidism, pulmonary HTN, DM on insulin, HTN, COPD/CHRIS on noctournal CPAP and 3L home), RA on chronic prednisone, chronic tremors, SBO s/p ex lap with lysis of adhesions c/b evisceration p/w agitation 3 weeks of unclear etiology, found to be significantly hypothyroid (), MRI brain found to have small right sided SDH, hospital course c/b catatonia improved with Ativan. Course recently c/b DKA. Pending optimization of psychiatric regimen.

## 2020-03-16 NOTE — PROGRESS NOTE ADULT - ATTENDING COMMENTS
Patient seen and examined.    64F with a history of hypothyroidism, pHTN, IDDM, COPD/CHRIS, RA (on Prednisone), SBO s/p ex lap with lysis of adhesions c/b evisceration presented with encephalopathy and agitation found to be significantly hypothyroid (, without myxedema coma), small (stable) right sided SDH, UTI with hospital course complicated by adverse catatonic reaction to VPA (now resolved) and DKA (resolved).     Encephalopathy: Likely multifactorial from hypothyroid, SDH, UTI, DKA. Now medically stable and treated for. Per psych, this may have created a new baseline mental status.     Psychiatric disease: Remains intermittently combative with staff (spitting, yelling) and delusional. Appreciate psychiatry guidance in terms of medication management. We discussed recommended changes with family, who was agreeable (reportedly had not been in the past).   If patient refusing synthroid or prednisone PO, intravenous formulations should be given. She does not have capacity  She does not have capacity to refuse insulin.    Diabetes: Continues to refuse insulin, medications. Endocrine assistance appreciated. If patient refusing insulin, she is not to be fed.     Discharge planning: Underway. Will discuss option for family meeting this week. She is medically stable. As noted by Dr. Dugan, It may be better to disposition Ms. Morrow to a [facility] as they have additional ancillary support who can assist in medical management.  We will discuss this with Norris.    We also consulted Ethics, as discharge planning, medication management has been complicated with family.

## 2020-03-16 NOTE — PROGRESS NOTE ADULT - PROBLEM SELECTOR PLAN 2
#DKA- resolved  - continue with moderate ISS  -lantus 26U qdaily  -14 units humalog for breakfast.  -12 units humalog for lunch   -10 units humalog for dinner.   -endocrine team following.  -patient should cannot eat without pre meal insulin as she may go into DKA #DKA- resolved  - continue with moderate ISS  -lantus 26U qdaily  -14 units humalog for breakfast.  -12 units humalog for lunch   -10 units humalog for dinner.   -endocrine team following.  -patient cannot eat without pre meal insulin as she may go into DKA

## 2020-03-16 NOTE — PROGRESS NOTE BEHAVIORAL HEALTH - NSBHCONSULTFOLLOWAFTERCARE_PSY_A_CORE FT
OP psych f/u at [facility]- [phone]  [facility] clinic- [phone]

## 2020-03-16 NOTE — CHART NOTE - NSCHARTNOTEFT_GEN_A_CORE
Nutrition Follow Up Note    Patient seen for nutrition follow up.     Source: Comprehensive chart review, patient    Chart reviewed, events noted. Pending optimization of psychiatric regimen. Endocrinology following for insulin regimen. POCT blood glucose noted below.    Diet : Soft, Consistent Carbohydrate    Patient seen, reports dissatisfaction and dislike of institutional foods. Reports otherwise eating fairly. Refused to talk further regarding nutrition and requested nursing staff; PCA made aware. No complaints of nausea/vomiting, diarrhea/constipation. Per chart, last BM      PO intake : 50-75%     Source for PO intake: patient     Enteral/Parenteral Nutrition: none    Daily Weights: no new weight to address at this time; recommend obtain new weight and continue to monitor    Pertinent Medications: MEDICATIONS  (STANDING):  ARIPiprazole  Solution 5 milliGRAM(s) Oral daily  budesonide  80 MICROgram(s)/formoterol 4.5 MICROgram(s) Inhaler 2 Puff(s) Inhalation two times a day  dextrose 5%. 1000 milliLiter(s) (50 mL/Hr) IV Continuous <Continuous>  dextrose 50% Injectable 12.5 Gram(s) IV Push once  dextrose 50% Injectable 25 Gram(s) IV Push once  dextrose 50% Injectable 25 Gram(s) IV Push once  heparin  Injectable 5000 Unit(s) SubCutaneous every 8 hours  influenza   Vaccine 0.5 milliLiter(s) IntraMuscular once  insulin glargine Injectable (LANTUS) 26 Unit(s) SubCutaneous <User Schedule>  insulin lispro (HumaLOG) corrective regimen sliding scale   SubCutaneous three times a day before meals  insulin lispro (HumaLOG) corrective regimen sliding scale   SubCutaneous at bedtime  insulin lispro Injectable (HumaLOG) 14 Unit(s) SubCutaneous before breakfast  insulin lispro Injectable (HumaLOG) 12 Unit(s) SubCutaneous before lunch  insulin lispro Injectable (HumaLOG) 10 Unit(s) SubCutaneous before dinner  levothyroxine 175 MICROGram(s) Oral daily  losartan 25 milliGRAM(s) Oral daily  metoprolol tartrate 25 milliGRAM(s) Oral two times a day  multivitamin/minerals/iron Oral Solution (CENTRUM) 15 milliLiter(s) Oral daily  nystatin Powder 1 Application(s) Topical every 12 hours  pantoprazole    Tablet 40 milliGRAM(s) Oral <User Schedule>  predniSONE   Tablet 15 milliGRAM(s) Oral daily  primidone 50 milliGRAM(s) Oral at bedtime  simvastatin 20 milliGRAM(s) Oral at bedtime  thiamine 100 milliGRAM(s) Oral daily    MEDICATIONS  (PRN):  acetaminophen   Tablet .. 975 milliGRAM(s) Oral every 6 hours PRN Moderate Pain (4 - 6), Severe Pain (7 - 10)  acetaminophen  Suppository .. 650 milliGRAM(s) Rectal every 6 hours PRN Temp greater or equal to 38C (100.4F)  albuterol/ipratropium for Nebulization 3 milliLiter(s) Nebulizer every 6 hours PRN Shortness of Breath and/or Wheezing  dextrose 40% Gel 15 Gram(s) Oral once PRN Blood Glucose LESS THAN 70 milliGRAM(s)/deciLiter  glucagon  Injectable 1 milliGRAM(s) IntraMuscular once PRN Glucose <70 milliGRAM(s)/deciLiter  melatonin 3 milliGRAM(s) Oral at bedtime PRN Insomnia    Pertinent Labs:   Finger Sticks:  POCT Blood Glucose.: 149 mg/dL (03-16 @ 11:40)  POCT Blood Glucose.: 209 mg/dL (03-16 @ 08:25)  POCT Blood Glucose.: 175 mg/dL (03-15 @ 22:07)  POCT Blood Glucose.: 181 mg/dL (03-15 @ 18:01)      Skin per nursing documentation: free of pressure injuries, abdomen wound  Edema per nursing documentation: 1+ generalized    Estimated Needs:   [ x] no change since previous assessment  [ ] recalculated:     Previous Nutrition Diagnosis: Moderate Malnutrition  Nutrition Diagnosis is: improving with increasing PO intake    Previous Nutrition Diagnosis: Altered Nutrition Related Lab Values  Nutrition Diagnosis is: ongoing, addressed with therapeutic diet order and nutrition education as pt amenable    New Nutrition Diagnosis: none     Interventions: Nutrition education deferred by patient. RD availability made known.     Recommend  1) Continue current diet order of Soft, Consistent Carbohydrate - defer texture/consistency to medical team; on 2/27 recommended for Soft texture following bedside swallow assessment  2) Monitor acceptance of oral nutrition supplements  3) Will attempt to reinforce nutrition education at follow up - reinforce PRN  4) Obtain/honor food preferences within diet restrictions as feasible    Monitoring and Evaluation:   - Continue to monitor nutritional intake, tolerance to diet prescription, weights, labs, skin integrity  - RD remains available upon request and will follow up per protocol    Lana Engle RD CDN    Pager# [phone] Nutrition Follow Up Note    Patient seen for nutrition follow up.     Source: Comprehensive chart review, patient    Chart reviewed, events noted. Pending optimization of psychiatric regimen. Endocrinology following for insulin regimen. POCT blood glucose noted below.    Diet : Soft, Consistent Carbohydrate    Patient seen, reports dislike of institutional foods; otherwise eating fairly. Refused to talk further regarding nutrition and requested nursing staff; PCA made aware. No complaints of nausea/vomiting, diarrhea/constipation. Per chart, last BM 3/15.     PO intake : 50-75%     Source for PO intake: patient     Enteral/Parenteral Nutrition: none    Daily Weights: no new weight to address at this time; recommend obtain new weight and continue to monitor    Pertinent Medications: MEDICATIONS  (STANDING):  ARIPiprazole  Solution 5 milliGRAM(s) Oral daily  budesonide  80 MICROgram(s)/formoterol 4.5 MICROgram(s) Inhaler 2 Puff(s) Inhalation two times a day  dextrose 5%. 1000 milliLiter(s) (50 mL/Hr) IV Continuous <Continuous>  dextrose 50% Injectable 12.5 Gram(s) IV Push once  dextrose 50% Injectable 25 Gram(s) IV Push once  dextrose 50% Injectable 25 Gram(s) IV Push once  heparin  Injectable 5000 Unit(s) SubCutaneous every 8 hours  influenza   Vaccine 0.5 milliLiter(s) IntraMuscular once  insulin glargine Injectable (LANTUS) 26 Unit(s) SubCutaneous <User Schedule>  insulin lispro (HumaLOG) corrective regimen sliding scale   SubCutaneous three times a day before meals  insulin lispro (HumaLOG) corrective regimen sliding scale   SubCutaneous at bedtime  insulin lispro Injectable (HumaLOG) 14 Unit(s) SubCutaneous before breakfast  insulin lispro Injectable (HumaLOG) 12 Unit(s) SubCutaneous before lunch  insulin lispro Injectable (HumaLOG) 10 Unit(s) SubCutaneous before dinner  levothyroxine 175 MICROGram(s) Oral daily  losartan 25 milliGRAM(s) Oral daily  metoprolol tartrate 25 milliGRAM(s) Oral two times a day  multivitamin/minerals/iron Oral Solution (CENTRUM) 15 milliLiter(s) Oral daily  nystatin Powder 1 Application(s) Topical every 12 hours  pantoprazole    Tablet 40 milliGRAM(s) Oral <User Schedule>  predniSONE   Tablet 15 milliGRAM(s) Oral daily  primidone 50 milliGRAM(s) Oral at bedtime  simvastatin 20 milliGRAM(s) Oral at bedtime  thiamine 100 milliGRAM(s) Oral daily    MEDICATIONS  (PRN):  acetaminophen   Tablet .. 975 milliGRAM(s) Oral every 6 hours PRN Moderate Pain (4 - 6), Severe Pain (7 - 10)  acetaminophen  Suppository .. 650 milliGRAM(s) Rectal every 6 hours PRN Temp greater or equal to 38C (100.4F)  albuterol/ipratropium for Nebulization 3 milliLiter(s) Nebulizer every 6 hours PRN Shortness of Breath and/or Wheezing  dextrose 40% Gel 15 Gram(s) Oral once PRN Blood Glucose LESS THAN 70 milliGRAM(s)/deciLiter  glucagon  Injectable 1 milliGRAM(s) IntraMuscular once PRN Glucose <70 milliGRAM(s)/deciLiter  melatonin 3 milliGRAM(s) Oral at bedtime PRN Insomnia    Pertinent Labs:   Finger Sticks:  POCT Blood Glucose.: 149 mg/dL (03-16 @ 11:40)  POCT Blood Glucose.: 209 mg/dL (03-16 @ 08:25)  POCT Blood Glucose.: 175 mg/dL (03-15 @ 22:07)  POCT Blood Glucose.: 181 mg/dL (03-15 @ 18:01)      Skin per nursing documentation: free of pressure injuries, abdomen wound  Edema per nursing documentation: 1+ generalized    Estimated Needs:   [ x] no change since previous assessment  [ ] recalculated:     Previous Nutrition Diagnosis #1: Moderate Malnutrition  Nutrition Diagnosis is: improving with increasing PO intake    Previous Nutrition Diagnosis #2: Altered Nutrition Related Lab Values  Nutrition Diagnosis is: ongoing, addressed with therapeutic diet order and nutrition education as pt amenable    New Nutrition Diagnosis: none     Interventions: Nutrition education deferred by patient. RD availability made known.     Recommend  1) Continue current diet order of Soft, Consistent Carbohydrate - defer texture/consistency to medical team; on 2/27 recommended for Soft texture following bedside swallow assessment  2) Monitor acceptance of oral nutrition supplements  3) Will attempt to reinforce nutrition education at follow up - reinforce PRN  4) Obtain/honor food preferences within diet restrictions as feasible    Monitoring and Evaluation:   - Continue to monitor nutritional intake, tolerance to diet prescription, weights, labs, skin integrity  - RD remains available upon request and will follow up per protocol    Lana Engle RD CDN    Pager# [phone]

## 2020-03-16 NOTE — PROGRESS NOTE ADULT - SUBJECTIVE AND OBJECTIVE BOX
Glenn Ortega, PGY1  Pager: [place] [phone], [place] [phone]    ----------------------------------------------------------------------------------------------------------------------------------------------------------------------------------    PROGRESS NOTE:     Patient is a 64y old  Female who presents with a chief complaint of change in mental status (15 Mar 2020 11:11)      SUBJECTIVE / OVERNIGHT EVENTS:    ADDITIONAL REVIEW OF SYSTEMS:    MEDICATIONS  (STANDING):  ARIPiprazole  Solution 5 milliGRAM(s) Oral daily  budesonide  80 MICROgram(s)/formoterol 4.5 MICROgram(s) Inhaler 2 Puff(s) Inhalation two times a day  dextrose 5%. 1000 milliLiter(s) (50 mL/Hr) IV Continuous <Continuous>  dextrose 50% Injectable 12.5 Gram(s) IV Push once  dextrose 50% Injectable 25 Gram(s) IV Push once  dextrose 50% Injectable 25 Gram(s) IV Push once  heparin  Injectable 5000 Unit(s) SubCutaneous every 8 hours  influenza   Vaccine 0.5 milliLiter(s) IntraMuscular once  insulin glargine Injectable (LANTUS) 26 Unit(s) SubCutaneous <User Schedule>  insulin lispro (HumaLOG) corrective regimen sliding scale   SubCutaneous three times a day before meals  insulin lispro (HumaLOG) corrective regimen sliding scale   SubCutaneous at bedtime  insulin lispro Injectable (HumaLOG) 14 Unit(s) SubCutaneous before breakfast  insulin lispro Injectable (HumaLOG) 12 Unit(s) SubCutaneous before lunch  insulin lispro Injectable (HumaLOG) 10 Unit(s) SubCutaneous before dinner  levothyroxine 175 MICROGram(s) Oral daily  losartan 25 milliGRAM(s) Oral daily  metoprolol tartrate 25 milliGRAM(s) Oral two times a day  multivitamin/minerals/iron Oral Solution (CENTRUM) 15 milliLiter(s) Oral daily  nystatin Powder 1 Application(s) Topical every 12 hours  pantoprazole    Tablet 40 milliGRAM(s) Oral <User Schedule>  predniSONE   Tablet 15 milliGRAM(s) Oral daily  primidone 50 milliGRAM(s) Oral at bedtime  simvastatin 20 milliGRAM(s) Oral at bedtime  thiamine 100 milliGRAM(s) Oral daily    MEDICATIONS  (PRN):  acetaminophen   Tablet .. 975 milliGRAM(s) Oral every 6 hours PRN Moderate Pain (4 - 6), Severe Pain (7 - 10)  acetaminophen  Suppository .. 650 milliGRAM(s) Rectal every 6 hours PRN Temp greater or equal to 38C (100.4F)  albuterol/ipratropium for Nebulization 3 milliLiter(s) Nebulizer every 6 hours PRN Shortness of Breath and/or Wheezing  dextrose 40% Gel 15 Gram(s) Oral once PRN Blood Glucose LESS THAN 70 milliGRAM(s)/deciLiter  glucagon  Injectable 1 milliGRAM(s) IntraMuscular once PRN Glucose <70 milliGRAM(s)/deciLiter  melatonin 3 milliGRAM(s) Oral at bedtime PRN Insomnia      CAPILLARY BLOOD GLUCOSE      POCT Blood Glucose.: 209 mg/dL (16 Mar 2020 08:25)  POCT Blood Glucose.: 175 mg/dL (15 Mar 2020 22:07)  POCT Blood Glucose.: 181 mg/dL (15 Mar 2020 18:01)  POCT Blood Glucose.: 262 mg/dL (15 Mar 2020 13:21)    I&O's Summary      PHYSICAL EXAM:  Vital Signs Last 24 Hrs  T(C): 36.5 (16 Mar 2020 06:17), Max: 37.3 (15 Mar 2020 22:45)  T(F): 97.7 (16 Mar 2020 06:17), Max: 99.1 (15 Mar 2020 22:45)  HR: 87 (16 Mar 2020 06:17) (83 - 97)  BP: 121/75 (16 Mar 2020 06:17) (98/63 - 121/75)  BP(mean): --  RR: 16 (16 Mar 2020 06:17) (16 - 16)  SpO2: 95% (16 Mar 2020 06:17) (95% - 96%)    CONSTITUTIONAL: NAD, well-developed  RESPIRATORY: Normal respiratory effort; lungs are clear to auscultation bilaterally  CARDIOVASCULAR: normal S1 and S2, rrr, no mrg; No lower extremity edema; Peripheral pulses are 2+ bilaterally  ABDOMEN: soft, NTND, BS+; No hepatosplenomegaly  MUSCULOSKELETAL: no clubbing or cyanosis of digits; no joint swelling or tenderness to palpation  PSYCH: A+O to person, place, and time; affect appropriate    LABS:                      RADIOLOGY & ADDITIONAL TESTS:  Results Reviewed:   Imaging Personally Reviewed:  Electrocardiogram Personally Reviewed: Glenn Ortega, PGY1  Pager: [place] [phone], [facility] [phone]    ----------------------------------------------------------------------------------------------------------------------------------------------------------------------------------    PROGRESS NOTE:     Patient is a 64y old  Female who presents with a chief complaint of change in mental status (15 Mar 2020 11:11)      SUBJECTIVE / OVERNIGHT EVENTS: no acute events overnight. Pt states that she feels well today, believes that she is supposed to be discharged. Denies SOB, dizziness, CP, SOB, abdominal pain. Tolerating PO intake.    ADDITIONAL REVIEW OF SYSTEMS:    CONSTITUTIONAL: No weakness, fevers or chills  EYES/ENT: No visual changes;  No dysphagia  NECK: No pain or stiffness  RESPIRATORY: No cough, wheezing, hemoptysis; No shortness of breath  CARDIOVASCULAR: No chest pain or palpitations; No lower extremity edema  GASTROINTESTINAL: No abdominal or epigastric pain. No nausea, vomiting, or hematemesis; No diarrhea or constipation. No melena or hematochezia.  GENITOURINARY: No dysuria, frequency or hematuria    MEDICATIONS  (STANDING):  ARIPiprazole  Solution 5 milliGRAM(s) Oral daily  budesonide  80 MICROgram(s)/formoterol 4.5 MICROgram(s) Inhaler 2 Puff(s) Inhalation two times a day  dextrose 5%. 1000 milliLiter(s) (50 mL/Hr) IV Continuous <Continuous>  dextrose 50% Injectable 12.5 Gram(s) IV Push once  dextrose 50% Injectable 25 Gram(s) IV Push once  dextrose 50% Injectable 25 Gram(s) IV Push once  heparin  Injectable 5000 Unit(s) SubCutaneous every 8 hours  influenza   Vaccine 0.5 milliLiter(s) IntraMuscular once  insulin glargine Injectable (LANTUS) 26 Unit(s) SubCutaneous <User Schedule>  insulin lispro (HumaLOG) corrective regimen sliding scale   SubCutaneous three times a day before meals  insulin lispro (HumaLOG) corrective regimen sliding scale   SubCutaneous at bedtime  insulin lispro Injectable (HumaLOG) 14 Unit(s) SubCutaneous before breakfast  insulin lispro Injectable (HumaLOG) 12 Unit(s) SubCutaneous before lunch  insulin lispro Injectable (HumaLOG) 10 Unit(s) SubCutaneous before dinner  levothyroxine 175 MICROGram(s) Oral daily  losartan 25 milliGRAM(s) Oral daily  metoprolol tartrate 25 milliGRAM(s) Oral two times a day  multivitamin/minerals/iron Oral Solution (CENTRUM) 15 milliLiter(s) Oral daily  nystatin Powder 1 Application(s) Topical every 12 hours  pantoprazole    Tablet 40 milliGRAM(s) Oral <User Schedule>  predniSONE   Tablet 15 milliGRAM(s) Oral daily  primidone 50 milliGRAM(s) Oral at bedtime  simvastatin 20 milliGRAM(s) Oral at bedtime  thiamine 100 milliGRAM(s) Oral daily    MEDICATIONS  (PRN):  acetaminophen   Tablet .. 975 milliGRAM(s) Oral every 6 hours PRN Moderate Pain (4 - 6), Severe Pain (7 - 10)  acetaminophen  Suppository .. 650 milliGRAM(s) Rectal every 6 hours PRN Temp greater or equal to 38C (100.4F)  albuterol/ipratropium for Nebulization 3 milliLiter(s) Nebulizer every 6 hours PRN Shortness of Breath and/or Wheezing  dextrose 40% Gel 15 Gram(s) Oral once PRN Blood Glucose LESS THAN 70 milliGRAM(s)/deciLiter  glucagon  Injectable 1 milliGRAM(s) IntraMuscular once PRN Glucose <70 milliGRAM(s)/deciLiter  melatonin 3 milliGRAM(s) Oral at bedtime PRN Insomnia      CAPILLARY BLOOD GLUCOSE      POCT Blood Glucose.: 209 mg/dL (16 Mar 2020 08:25)  POCT Blood Glucose.: 175 mg/dL (15 Mar 2020 22:07)  POCT Blood Glucose.: 181 mg/dL (15 Mar 2020 18:01)  POCT Blood Glucose.: 262 mg/dL (15 Mar 2020 13:21)    I&O's Summary      PHYSICAL EXAM:  Vital Signs Last 24 Hrs  T(C): 36.5 (16 Mar 2020 06:17), Max: 37.3 (15 Mar 2020 22:45)  T(F): 97.7 (16 Mar 2020 06:17), Max: 99.1 (15 Mar 2020 22:45)  HR: 87 (16 Mar 2020 06:17) (83 - 97)  BP: 121/75 (16 Mar 2020 06:17) (98/63 - 121/75)  BP(mean): --  RR: 16 (16 Mar 2020 06:17) (16 - 16)  SpO2: 95% (16 Mar 2020 06:17) (95% - 96%)    CONSTITUTIONAL: NAD, well-developed, aox3, lying in bed  RESPIRATORY: decreased inspiratory effort; lungs are clear to auscultation bilaterally  CARDIOVASCULAR: normal S1 and S2, rrr, no mrg; No lower extremity edema; Peripheral pulses are 2+ bilaterally  ABDOMEN: soft, NTND, BS+; No hepatosplenomegaly, abdominal wound dressed  MUSCULOSKELETAL: no clubbing or cyanosis of digits; no joint swelling or tenderness to palpation  PSYCH: A+O to person, place, and time; affect appropriate    LABS:                      RADIOLOGY & ADDITIONAL TESTS:  Results Reviewed:   Imaging Personally Reviewed:  Electrocardiogram Personally Reviewed: Glenn Ortega, PGY1  Pager: [place] [phone], [facility] [phone]    ----------------------------------------------------------------------------------------------------------------------------------------------------------------------------------    PROGRESS NOTE:     Patient is a 64y old  Female who presents with a chief complaint of change in mental status (15 Mar 2020 11:11)      SUBJECTIVE / OVERNIGHT EVENTS: no acute events overnight. Pt states that she feels well today, believes that she is supposed to be discharged. Denies SOB, dizziness, CP, SOB, abdominal pain. Tolerating PO intake.    ADDITIONAL REVIEW OF SYSTEMS:    CONSTITUTIONAL: No weakness, fevers or chills  EYES/ENT: No visual changes;  No dysphagia  NECK: No pain or stiffness  RESPIRATORY: No cough, wheezing, hemoptysis; No shortness of breath  CARDIOVASCULAR: No chest pain or palpitations; No lower extremity edema  GASTROINTESTINAL: No abdominal or epigastric pain. No nausea, vomiting, or hematemesis; No diarrhea or constipation. No melena or hematochezia.  GENITOURINARY: No dysuria, frequency or hematuria    MEDICATIONS  (STANDING):  ARIPiprazole  Solution 5 milliGRAM(s) Oral daily  budesonide  80 MICROgram(s)/formoterol 4.5 MICROgram(s) Inhaler 2 Puff(s) Inhalation two times a day  dextrose 5%. 1000 milliLiter(s) (50 mL/Hr) IV Continuous <Continuous>  dextrose 50% Injectable 12.5 Gram(s) IV Push once  dextrose 50% Injectable 25 Gram(s) IV Push once  dextrose 50% Injectable 25 Gram(s) IV Push once  heparin  Injectable 5000 Unit(s) SubCutaneous every 8 hours  influenza   Vaccine 0.5 milliLiter(s) IntraMuscular once  insulin glargine Injectable (LANTUS) 26 Unit(s) SubCutaneous <User Schedule>  insulin lispro (HumaLOG) corrective regimen sliding scale   SubCutaneous three times a day before meals  insulin lispro (HumaLOG) corrective regimen sliding scale   SubCutaneous at bedtime  insulin lispro Injectable (HumaLOG) 14 Unit(s) SubCutaneous before breakfast  insulin lispro Injectable (HumaLOG) 12 Unit(s) SubCutaneous before lunch  insulin lispro Injectable (HumaLOG) 10 Unit(s) SubCutaneous before dinner  levothyroxine 175 MICROGram(s) Oral daily  losartan 25 milliGRAM(s) Oral daily  metoprolol tartrate 25 milliGRAM(s) Oral two times a day  multivitamin/minerals/iron Oral Solution (CENTRUM) 15 milliLiter(s) Oral daily  nystatin Powder 1 Application(s) Topical every 12 hours  pantoprazole    Tablet 40 milliGRAM(s) Oral <User Schedule>  predniSONE   Tablet 15 milliGRAM(s) Oral daily  primidone 50 milliGRAM(s) Oral at bedtime  simvastatin 20 milliGRAM(s) Oral at bedtime  thiamine 100 milliGRAM(s) Oral daily    MEDICATIONS  (PRN):  acetaminophen   Tablet .. 975 milliGRAM(s) Oral every 6 hours PRN Moderate Pain (4 - 6), Severe Pain (7 - 10)  acetaminophen  Suppository .. 650 milliGRAM(s) Rectal every 6 hours PRN Temp greater or equal to 38C (100.4F)  albuterol/ipratropium for Nebulization 3 milliLiter(s) Nebulizer every 6 hours PRN Shortness of Breath and/or Wheezing  dextrose 40% Gel 15 Gram(s) Oral once PRN Blood Glucose LESS THAN 70 milliGRAM(s)/deciLiter  glucagon  Injectable 1 milliGRAM(s) IntraMuscular once PRN Glucose <70 milliGRAM(s)/deciLiter  melatonin 3 milliGRAM(s) Oral at bedtime PRN Insomnia      CAPILLARY BLOOD GLUCOSE      POCT Blood Glucose.: 209 mg/dL (16 Mar 2020 08:25)  POCT Blood Glucose.: 175 mg/dL (15 Mar 2020 22:07)  POCT Blood Glucose.: 181 mg/dL (15 Mar 2020 18:01)  POCT Blood Glucose.: 262 mg/dL (15 Mar 2020 13:21)    I&O's Summary      PHYSICAL EXAM:  Vital Signs Last 24 Hrs  T(C): 36.5 (16 Mar 2020 06:17), Max: 37.3 (15 Mar 2020 22:45)  T(F): 97.7 (16 Mar 2020 06:17), Max: 99.1 (15 Mar 2020 22:45)  HR: 87 (16 Mar 2020 06:17) (83 - 97)  BP: 121/75 (16 Mar 2020 06:17) (98/63 - 121/75)  BP(mean): --  RR: 16 (16 Mar 2020 06:17) (16 - 16)  SpO2: 95% (16 Mar 2020 06:17) (95% - 96%)    CONSTITUTIONAL: NAD, well-developed, aox3, lying in bed  RESPIRATORY: decreased inspiratory effort; lungs are clear to auscultation bilaterally  CARDIOVASCULAR: normal S1 and S2, rrr, no mrg; No lower extremity edema; Peripheral pulses are 2+ bilaterally  ABDOMEN: soft, NTND, BS+; No hepatosplenomegaly, abdominal wound dressed  MUSCULOSKELETAL: no clubbing or cyanosis of digits; no joint swelling or tenderness to palpation  PSYCH: A+O to person, place, and time; Combative, angry, affect appropriate    LABS:                      RADIOLOGY & ADDITIONAL TESTS:  Results Reviewed:   Imaging Personally Reviewed:  Electrocardiogram Personally Reviewed:      Case discussed with: Psychiatry attending, Ethics

## 2020-03-16 NOTE — PROGRESS NOTE ADULT - PROBLEM SELECTOR PLAN 1
Likely underlying rapidly progressive dementia, vs hospital delirium vs sequelae from stroke/SDH.  Likely patient's new mental status  -5mg abilify qd per psych  -holding off on Haldol per family request  -Hypothyroidism possible etiology; , free T4 0.3. Endocrine following. Now on PO 175mcg synthroid qd. Plan to recheck thyroid levels including TSH, free T4 when patient collaborates with blood draws   - cw prednisone 15mg.   - consider reconsulting ethics  this week, as unclear who is the true decision maker for this patient  - consider scheduling family meeting with both psych and neurology involvement to address patient's final treatment/medication plan and disposition ([facility] vs home with home care) Likely underlying rapidly progressive dementia, vs hospital delirium vs sequelae from stroke/SDH.  Likely patient's new mental status  -5mg abilify qd per psych  -holding off on Haldol per family request  -Hypothyroidism possible etiology; , free T4 0.3. Endocrine following. Now on PO 175mcg synthroid qd. Plan to recheck thyroid levels including TSH, free T4 when patient collaborates with blood draws   - cw prednisone 15mg.   - will reconsult ethics   - consider scheduling family meeting with both psych and neurology involvement to address patient's final treatment/medication plan and disposition ([facility] vs home with home care)

## 2020-03-16 NOTE — PROGRESS NOTE BEHAVIORAL HEALTH - NSBHCONSULTRECOMMENDOTHER_PSY_A_CORE FT
1) it should be noted, that due to pt's vascular lesions (hx of subacute stroke/SDH), pt may be at a new cognitive baseline, rule out dementia process. her mood lability symptoms have improved since her admission.     2) due to agitation and mood lability symptoms, it appears Abilify 5mg QD not effective at this juncture. may consider d/c abilify and consider low dose zyprexa 2.5 mg po BID for mood stability. PLEASE INFORM SHAKIR OR ALEXANDR for consent to use zyprexa as mood stabilizer prior to initiating. f/u QTc < 500 please.    3) family refusing haldol PRN as an option for agitation     4) no indication for inpt psychiatric admission

## 2020-03-17 LAB
ALBUMIN SERPL ELPH-MCNC: 3.6 G/DL — SIGNIFICANT CHANGE UP (ref 3.3–5)
ALP SERPL-CCNC: 98 U/L — SIGNIFICANT CHANGE UP (ref 40–120)
ALT FLD-CCNC: 23 U/L — SIGNIFICANT CHANGE UP (ref 10–45)
ANION GAP SERPL CALC-SCNC: 17 MMOL/L — SIGNIFICANT CHANGE UP (ref 5–17)
AST SERPL-CCNC: 26 U/L — SIGNIFICANT CHANGE UP (ref 10–40)
BASOPHILS # BLD AUTO: 0.05 K/UL — SIGNIFICANT CHANGE UP (ref 0–0.2)
BASOPHILS NFR BLD AUTO: 0.5 % — SIGNIFICANT CHANGE UP (ref 0–2)
BILIRUB SERPL-MCNC: 0.1 MG/DL — LOW (ref 0.2–1.2)
BUN SERPL-MCNC: 23 MG/DL — SIGNIFICANT CHANGE UP (ref 7–23)
CALCIUM SERPL-MCNC: 9.6 MG/DL — SIGNIFICANT CHANGE UP (ref 8.4–10.5)
CHLORIDE SERPL-SCNC: 103 MMOL/L — SIGNIFICANT CHANGE UP (ref 96–108)
CO2 SERPL-SCNC: 20 MMOL/L — LOW (ref 22–31)
CREAT SERPL-MCNC: 1.02 MG/DL — SIGNIFICANT CHANGE UP (ref 0.5–1.3)
EOSINOPHIL # BLD AUTO: 0.15 K/UL — SIGNIFICANT CHANGE UP (ref 0–0.5)
EOSINOPHIL NFR BLD AUTO: 1.5 % — SIGNIFICANT CHANGE UP (ref 0–6)
GLUCOSE BLDC GLUCOMTR-MCNC: 162 MG/DL — HIGH (ref 70–99)
GLUCOSE BLDC GLUCOMTR-MCNC: 182 MG/DL — HIGH (ref 70–99)
GLUCOSE BLDC GLUCOMTR-MCNC: 183 MG/DL — HIGH (ref 70–99)
GLUCOSE SERPL-MCNC: 160 MG/DL — HIGH (ref 70–99)
HCT VFR BLD CALC: 33.4 % — LOW (ref 34.5–45)
HGB BLD-MCNC: 9.6 G/DL — LOW (ref 11.5–15.5)
IMM GRANULOCYTES NFR BLD AUTO: 1.1 % — SIGNIFICANT CHANGE UP (ref 0–1.5)
LYMPHOCYTES # BLD AUTO: 1.49 K/UL — SIGNIFICANT CHANGE UP (ref 1–3.3)
LYMPHOCYTES # BLD AUTO: 14.8 % — SIGNIFICANT CHANGE UP (ref 13–44)
MAGNESIUM SERPL-MCNC: 1.8 MG/DL — SIGNIFICANT CHANGE UP (ref 1.6–2.6)
MCHC RBC-ENTMCNC: 27.4 PG — SIGNIFICANT CHANGE UP (ref 27–34)
MCHC RBC-ENTMCNC: 28.7 GM/DL — LOW (ref 32–36)
MCV RBC AUTO: 95.4 FL — SIGNIFICANT CHANGE UP (ref 80–100)
MONOCYTES # BLD AUTO: 0.57 K/UL — SIGNIFICANT CHANGE UP (ref 0–0.9)
MONOCYTES NFR BLD AUTO: 5.7 % — SIGNIFICANT CHANGE UP (ref 2–14)
NEUTROPHILS # BLD AUTO: 7.71 K/UL — HIGH (ref 1.8–7.4)
NEUTROPHILS NFR BLD AUTO: 76.4 % — SIGNIFICANT CHANGE UP (ref 43–77)
NRBC # BLD: 0 /100 WBCS — SIGNIFICANT CHANGE UP (ref 0–0)
PHOSPHATE SERPL-MCNC: 3.4 MG/DL — SIGNIFICANT CHANGE UP (ref 2.5–4.5)
PLATELET # BLD AUTO: 367 K/UL — SIGNIFICANT CHANGE UP (ref 150–400)
POTASSIUM SERPL-MCNC: 4 MMOL/L — SIGNIFICANT CHANGE UP (ref 3.5–5.3)
POTASSIUM SERPL-SCNC: 4 MMOL/L — SIGNIFICANT CHANGE UP (ref 3.5–5.3)
PROT SERPL-MCNC: 6.9 G/DL — SIGNIFICANT CHANGE UP (ref 6–8.3)
RBC # BLD: 3.5 M/UL — LOW (ref 3.8–5.2)
RBC # FLD: 15.5 % — HIGH (ref 10.3–14.5)
SODIUM SERPL-SCNC: 140 MMOL/L — SIGNIFICANT CHANGE UP (ref 135–145)
T4 AB SER-ACNC: 9.1 UG/DL — SIGNIFICANT CHANGE UP (ref 4.6–12)
TSH SERPL-MCNC: 13.2 UIU/ML — HIGH (ref 0.27–4.2)
WBC # BLD: 10.08 K/UL — SIGNIFICANT CHANGE UP (ref 3.8–10.5)
WBC # FLD AUTO: 10.08 K/UL — SIGNIFICANT CHANGE UP (ref 3.8–10.5)

## 2020-03-17 PROCEDURE — 99231 SBSQ HOSP IP/OBS SF/LOW 25: CPT

## 2020-03-17 PROCEDURE — 99233 SBSQ HOSP IP/OBS HIGH 50: CPT | Mod: GC

## 2020-03-17 RX ORDER — TRAMADOL HYDROCHLORIDE 50 MG/1
25 TABLET ORAL ONCE
Refills: 0 | Status: DISCONTINUED | OUTPATIENT
Start: 2020-03-17 | End: 2020-03-17

## 2020-03-17 RX ADMIN — OLANZAPINE 2.5 MILLIGRAM(S): 15 TABLET, FILM COATED ORAL at 06:57

## 2020-03-17 RX ADMIN — LOSARTAN POTASSIUM 25 MILLIGRAM(S): 100 TABLET, FILM COATED ORAL at 06:57

## 2020-03-17 RX ADMIN — INSULIN GLARGINE 26 UNIT(S): 100 INJECTION, SOLUTION SUBCUTANEOUS at 08:02

## 2020-03-17 RX ADMIN — Medication 25 MILLIGRAM(S): at 18:05

## 2020-03-17 RX ADMIN — HEPARIN SODIUM 5000 UNIT(S): 5000 INJECTION INTRAVENOUS; SUBCUTANEOUS at 10:44

## 2020-03-17 RX ADMIN — OLANZAPINE 2.5 MILLIGRAM(S): 15 TABLET, FILM COATED ORAL at 18:05

## 2020-03-17 RX ADMIN — Medication 2: at 07:59

## 2020-03-17 RX ADMIN — Medication 15 MILLIGRAM(S): at 06:56

## 2020-03-17 RX ADMIN — PRIMIDONE 50 MILLIGRAM(S): 250 TABLET ORAL at 21:06

## 2020-03-17 RX ADMIN — Medication 2: at 12:50

## 2020-03-17 RX ADMIN — Medication 175 MICROGRAM(S): at 06:56

## 2020-03-17 RX ADMIN — HEPARIN SODIUM 5000 UNIT(S): 5000 INJECTION INTRAVENOUS; SUBCUTANEOUS at 18:13

## 2020-03-17 RX ADMIN — Medication 14 UNIT(S): at 08:00

## 2020-03-17 RX ADMIN — Medication 25 MILLIGRAM(S): at 06:56

## 2020-03-17 RX ADMIN — NYSTATIN CREAM 1 APPLICATION(S): 100000 CREAM TOPICAL at 18:12

## 2020-03-17 RX ADMIN — Medication 2: at 17:54

## 2020-03-17 RX ADMIN — Medication 100 MILLIGRAM(S): at 10:48

## 2020-03-17 RX ADMIN — PANTOPRAZOLE SODIUM 40 MILLIGRAM(S): 20 TABLET, DELAYED RELEASE ORAL at 10:48

## 2020-03-17 RX ADMIN — SIMVASTATIN 20 MILLIGRAM(S): 20 TABLET, FILM COATED ORAL at 21:06

## 2020-03-17 RX ADMIN — NYSTATIN CREAM 1 APPLICATION(S): 100000 CREAM TOPICAL at 06:57

## 2020-03-17 NOTE — PROGRESS NOTE ADULT - PROBLEM SELECTOR PLAN 1
-test BG AC/HS  -C/w Lantus 26 units QAM.  -C/w humalog dose to 14-12-10 units premeal   -C/w humalog mod dose sliding scale ac meals and hs.     Discharge plan: basal/bolus  -Even though pt is alert and talking, she still unable to care for her DM. Per primary team plan to discharge to rehab when more stable  -f/u outpt w/Dr Saul. Will make apt closer to discharge    -Plan discussed with pt's team and RN  Contact info: [phone] (24/7). pager [phone] no

## 2020-03-17 NOTE — PROGRESS NOTE ADULT - ATTENDING COMMENTS
Patient seen and examined.    64F with a history of hypothyroidism, pHTN, IDDM, COPD/CHRIS, RA (on Prednisone), SBO s/p ex lap with lysis of adhesions c/b evisceration presented with encephalopathy and agitation found to be significantly hypothyroid (, without myxedema coma), small (stable) right sided SDH, UTI with hospital course complicated by adverse catatonic reaction to VPA (now resolved) and DKA (resolved).     1. Psychiatric disease: She remains combative, and uncooperative, and delusional. Appreciate psychiatry guidance in terms of medication management. She is now on Zyprexa.   Patient does not have capacity to refuse essential medications: If patient refusing synthroid or prednisone PO, intravenous formulations should be given. Insulin should be given prior to meals.    2. Encephalopathy: Likely multifactorial from hypothyroid, SDH, UTI, DKA. Now medically stable and treated. Per psych, this may have created a new baseline mental status.     3. Hypothyroid, TSH >100 on presentation. No evidence of myxedema coma. Endocrine following. Will re-assess Free T4/ T3 on 3/20.     4. Diabetes: Continues to refuse insulin, medications. Endocrine assistance appreciated. If patient refusing insulin, she is not to be fed.     Discharge planning: Underway. Will discuss option for family meeting this week (possibly over phone considering COVID). She is medically stable. We appreciate guidance from psychiatry to help deal with her mental status in preparation for discharge as she is uncooperative and combative presently.   As noted by Dr. Dugan, It may be better to disposition Ms. Morrow to a [facility] as they have additional ancillary support who can assist in medical management.  We will discuss this with Norris.

## 2020-03-17 NOTE — PROGRESS NOTE ADULT - PROBLEM SELECTOR PLAN 1
Likely underlying rapidly progressive dementia, vs hospital delirium vs sequelae from stroke/SDH.  Likely patient's new mental status  -5mg abilify qd per psych  -holding off on Haldol per family request  -Hypothyroidism possible etiology; , free T4 0.3. Endocrine following. Now on PO 175mcg synthroid qd. Plan to recheck thyroid levels including TSH, free T4 when patient collaborates with blood draws   - cw prednisone 15mg.   - will reconsult ethics   - consider scheduling family meeting with both psych and neurology involvement to address patient's final treatment/medication plan and disposition ([facility] vs home with home care) Likely underlying rapidly progressive dementia, vs hospital delirium vs sequelae from stroke/SDH.  Likely patient's new mental status  -zyprexa 2.5 BID per psych  -holding off on Haldol per family request  -Hypothyroidism possible etiology; , free T4 0.3. Endocrine following. Now on PO 175mcg synthroid qd. Plan to recheck thyroid levels including TSH, free T4 when patient collaborates with blood draws   - cw prednisone 15mg.   - will reconsult ethics   - consider scheduling family meeting with both psych and neurology involvement to address patient's final treatment/medication plan and disposition ([facility] vs home with home care)

## 2020-03-17 NOTE — PROGRESS NOTE ADULT - ASSESSMENT
65 y/o F w/h/o uncontrolled T2DM with unknown complications. Also h/o functional paraplegia, hypothyroidism, pulmonary hypertension, COPD, RA on chronic prednisone, SBO s/p ex lap with lysis of adhesions c/b multifocal pneumonia with AMS of unknown etiology. Pt was on catatonic state that resolved with ativan administration and is now more alert but still confused and refusing meds/treatments on and off. Refusing meds/treatments on and off. Glycemic control fair with intermittent hyperglycemia depending on PO intake and if pt takes insulin as ordered. Taking synthroid and Prednisone more consistently now. No hypoglycemia. Since there is not a trend on BG levels will need to continue present insulin regimen for now. Noted not having lunch for the past 2 days so not premeal insulin has been given at this time. Primary team aware to give IV doses of steroid and synthroid whenever pt refuses these meds via PO. Will revaluate Total T3 and Free T4 3/20 to assess if levels are improving. No need to test TSH since will likely still above goal.    Spent 15 minutes assessing pt/labs/meds and discussing plan of care with primary team/staff

## 2020-03-17 NOTE — PROGRESS NOTE ADULT - SUBJECTIVE AND OBJECTIVE BOX
Glenn Ortega, PGY1  Pager: [place] [phone], [place] [phone]    ----------------------------------------------------------------------------------------------------------------------------------------------------------------------------------    PROGRESS NOTE:     Patient is a 64y old  Female who presents with a chief complaint of change in mental status (16 Mar 2020 10:35)      SUBJECTIVE / OVERNIGHT EVENTS:    ADDITIONAL REVIEW OF SYSTEMS:    MEDICATIONS  (STANDING):  budesonide  80 MICROgram(s)/formoterol 4.5 MICROgram(s) Inhaler 2 Puff(s) Inhalation two times a day  dextrose 5%. 1000 milliLiter(s) (50 mL/Hr) IV Continuous <Continuous>  dextrose 50% Injectable 12.5 Gram(s) IV Push once  dextrose 50% Injectable 25 Gram(s) IV Push once  dextrose 50% Injectable 25 Gram(s) IV Push once  heparin  Injectable 5000 Unit(s) SubCutaneous every 8 hours  influenza   Vaccine 0.5 milliLiter(s) IntraMuscular once  insulin glargine Injectable (LANTUS) 26 Unit(s) SubCutaneous <User Schedule>  insulin lispro (HumaLOG) corrective regimen sliding scale   SubCutaneous three times a day before meals  insulin lispro (HumaLOG) corrective regimen sliding scale   SubCutaneous at bedtime  insulin lispro Injectable (HumaLOG) 14 Unit(s) SubCutaneous before breakfast  insulin lispro Injectable (HumaLOG) 12 Unit(s) SubCutaneous before lunch  insulin lispro Injectable (HumaLOG) 10 Unit(s) SubCutaneous before dinner  levothyroxine 175 MICROGram(s) Oral daily  losartan 25 milliGRAM(s) Oral daily  metoprolol tartrate 25 milliGRAM(s) Oral two times a day  multivitamin/minerals/iron Oral Solution (CENTRUM) 15 milliLiter(s) Oral daily  nystatin Powder 1 Application(s) Topical every 12 hours  OLANZapine 2.5 milliGRAM(s) Oral two times a day  pantoprazole    Tablet 40 milliGRAM(s) Oral <User Schedule>  predniSONE   Tablet 15 milliGRAM(s) Oral daily  primidone 50 milliGRAM(s) Oral at bedtime  simvastatin 20 milliGRAM(s) Oral at bedtime  thiamine 100 milliGRAM(s) Oral daily    MEDICATIONS  (PRN):  acetaminophen   Tablet .. 975 milliGRAM(s) Oral every 6 hours PRN Moderate Pain (4 - 6), Severe Pain (7 - 10)  acetaminophen  Suppository .. 650 milliGRAM(s) Rectal every 6 hours PRN Temp greater or equal to 38C (100.4F)  albuterol/ipratropium for Nebulization 3 milliLiter(s) Nebulizer every 6 hours PRN Shortness of Breath and/or Wheezing  dextrose 40% Gel 15 Gram(s) Oral once PRN Blood Glucose LESS THAN 70 milliGRAM(s)/deciLiter  glucagon  Injectable 1 milliGRAM(s) IntraMuscular once PRN Glucose <70 milliGRAM(s)/deciLiter  melatonin 3 milliGRAM(s) Oral at bedtime PRN Insomnia      CAPILLARY BLOOD GLUCOSE      POCT Blood Glucose.: 162 mg/dL (17 Mar 2020 07:56)  POCT Blood Glucose.: 222 mg/dL (16 Mar 2020 17:10)  POCT Blood Glucose.: 149 mg/dL (16 Mar 2020 11:40)    I&O's Summary      PHYSICAL EXAM:  Vital Signs Last 24 Hrs  T(C): 36.7 (17 Mar 2020 07:00), Max: 37 (16 Mar 2020 14:12)  T(F): 98 (17 Mar 2020 07:00), Max: 98.6 (16 Mar 2020 14:12)  HR: 90 (17 Mar 2020 07:00) (90 - 90)  BP: 117/75 (17 Mar 2020 07:00) (117/75 - 117/75)  BP(mean): --  RR: 16 (17 Mar 2020 07:00) (16 - 16)  SpO2: 98% (17 Mar 2020 07:00) (98% - 98%)    CONSTITUTIONAL: NAD, well-developed  RESPIRATORY: Normal respiratory effort; lungs are clear to auscultation bilaterally  CARDIOVASCULAR: normal S1 and S2, rrr, no mrg; No lower extremity edema; Peripheral pulses are 2+ bilaterally  ABDOMEN: soft, NTND, BS+; No hepatosplenomegaly  MUSCULOSKELETAL: no clubbing or cyanosis of digits; no joint swelling or tenderness to palpation  PSYCH: A+O to person, place, and time; affect appropriate    LABS:                        9.6    10.08 )-----------( 367      ( 17 Mar 2020 10:47 )             33.4     03-17    140  |  103  |  23  ----------------------------<  160<H>  4.0   |  20<L>  |  1.02    Ca    9.6      17 Mar 2020 10:47  Phos  3.4     03-17  Mg     1.8     03-17    TPro  6.9  /  Alb  3.6  /  TBili  0.1<L>  /  DBili  x   /  AST  26  /  ALT  23  /  AlkPhos  98  03-17                RADIOLOGY & ADDITIONAL TESTS:  Results Reviewed:   Imaging Personally Reviewed:  Electrocardiogram Personally Reviewed: Glenn Ortega, PGY1  Pager: [place] [phone], [facility] [phone]    ----------------------------------------------------------------------------------------------------------------------------------------------------------------------------------    PROGRESS NOTE:     Patient is a 64y old  Female who presents with a chief complaint of change in mental status (16 Mar 2020 10:35)      SUBJECTIVE / OVERNIGHT EVENTS: pt refused vitals and finger sticks overnight. This morning, states that she should be discharged, believes that she is being mistreated.     ADDITIONAL REVIEW OF SYSTEMS:    CONSTITUTIONAL: No weakness, fevers or chills  EYES/ENT: No visual changes;  No dysphagia  NECK: No pain or stiffness  RESPIRATORY: No cough, wheezing, hemoptysis; No shortness of breath  CARDIOVASCULAR: No chest pain or palpitations; No lower extremity edema  GASTROINTESTINAL: No abdominal or epigastric pain. No nausea, vomiting, or hematemesis; No diarrhea or constipation. No melena or hematochezia.  GENITOURINARY: No dysuria, frequency or hematuria  NEUROLOGICAL: No numbness or weakness  HEMATOLOGY: No easy bleeding, no lymphadenopathy  SKIN: No itching, burning, rashes, or lesions   All other review of systems is negative unless indicated above.    MEDICATIONS  (STANDING):  budesonide  80 MICROgram(s)/formoterol 4.5 MICROgram(s) Inhaler 2 Puff(s) Inhalation two times a day  dextrose 5%. 1000 milliLiter(s) (50 mL/Hr) IV Continuous <Continuous>  dextrose 50% Injectable 12.5 Gram(s) IV Push once  dextrose 50% Injectable 25 Gram(s) IV Push once  dextrose 50% Injectable 25 Gram(s) IV Push once  heparin  Injectable 5000 Unit(s) SubCutaneous every 8 hours  influenza   Vaccine 0.5 milliLiter(s) IntraMuscular once  insulin glargine Injectable (LANTUS) 26 Unit(s) SubCutaneous <User Schedule>  insulin lispro (HumaLOG) corrective regimen sliding scale   SubCutaneous three times a day before meals  insulin lispro (HumaLOG) corrective regimen sliding scale   SubCutaneous at bedtime  insulin lispro Injectable (HumaLOG) 14 Unit(s) SubCutaneous before breakfast  insulin lispro Injectable (HumaLOG) 12 Unit(s) SubCutaneous before lunch  insulin lispro Injectable (HumaLOG) 10 Unit(s) SubCutaneous before dinner  levothyroxine 175 MICROGram(s) Oral daily  losartan 25 milliGRAM(s) Oral daily  metoprolol tartrate 25 milliGRAM(s) Oral two times a day  multivitamin/minerals/iron Oral Solution (CENTRUM) 15 milliLiter(s) Oral daily  nystatin Powder 1 Application(s) Topical every 12 hours  OLANZapine 2.5 milliGRAM(s) Oral two times a day  pantoprazole    Tablet 40 milliGRAM(s) Oral <User Schedule>  predniSONE   Tablet 15 milliGRAM(s) Oral daily  primidone 50 milliGRAM(s) Oral at bedtime  simvastatin 20 milliGRAM(s) Oral at bedtime  thiamine 100 milliGRAM(s) Oral daily    MEDICATIONS  (PRN):  acetaminophen   Tablet .. 975 milliGRAM(s) Oral every 6 hours PRN Moderate Pain (4 - 6), Severe Pain (7 - 10)  acetaminophen  Suppository .. 650 milliGRAM(s) Rectal every 6 hours PRN Temp greater or equal to 38C (100.4F)  albuterol/ipratropium for Nebulization 3 milliLiter(s) Nebulizer every 6 hours PRN Shortness of Breath and/or Wheezing  dextrose 40% Gel 15 Gram(s) Oral once PRN Blood Glucose LESS THAN 70 milliGRAM(s)/deciLiter  glucagon  Injectable 1 milliGRAM(s) IntraMuscular once PRN Glucose <70 milliGRAM(s)/deciLiter  melatonin 3 milliGRAM(s) Oral at bedtime PRN Insomnia      CAPILLARY BLOOD GLUCOSE      POCT Blood Glucose.: 162 mg/dL (17 Mar 2020 07:56)  POCT Blood Glucose.: 222 mg/dL (16 Mar 2020 17:10)  POCT Blood Glucose.: 149 mg/dL (16 Mar 2020 11:40)    I&O's Summary      PHYSICAL EXAM:  Vital Signs Last 24 Hrs  T(C): 36.7 (17 Mar 2020 07:00), Max: 37 (16 Mar 2020 14:12)  T(F): 98 (17 Mar 2020 07:00), Max: 98.6 (16 Mar 2020 14:12)  HR: 90 (17 Mar 2020 07:00) (90 - 90)  BP: 117/75 (17 Mar 2020 07:00) (117/75 - 117/75)  BP(mean): --  RR: 16 (17 Mar 2020 07:00) (16 - 16)  SpO2: 98% (17 Mar 2020 07:00) (98% - 98%)    CONSTITUTIONAL: NAD, well-developed  RESPIRATORY: Normal respiratory effort; lungs are clear to auscultation bilaterally  CARDIOVASCULAR: normal S1 and S2, rrr, no mrg; No lower extremity edema; Peripheral pulses are 2+ bilaterally  ABDOMEN: soft, NTND, BS+; No hepatosplenomegaly  MUSCULOSKELETAL: no clubbing or cyanosis of digits; no joint swelling or tenderness to palpation  PSYCH: A+O to person, place, and time; affect appropriate    LABS:                        9.6    10.08 )-----------( 367      ( 17 Mar 2020 10:47 )             33.4     03-17    140  |  103  |  23  ----------------------------<  160<H>  4.0   |  20<L>  |  1.02    Ca    9.6      17 Mar 2020 10:47  Phos  3.4     03-17  Mg     1.8     03-17    TPro  6.9  /  Alb  3.6  /  TBili  0.1<L>  /  DBili  x   /  AST  26  /  ALT  23  /  AlkPhos  98  03-17                RADIOLOGY & ADDITIONAL TESTS:  Results Reviewed:   Imaging Personally Reviewed:  Electrocardiogram Personally Reviewed: Glenn Ortega, PGY1  Pager: [place] [phone], [facility] [phone]    ----------------------------------------------------------------------------------------------------------------------------------------------------------------------------------    PROGRESS NOTE:     Patient is a 64y old  Female who presents with a chief complaint of change in mental status (16 Mar 2020 10:35)      SUBJECTIVE / OVERNIGHT EVENTS: pt refused vitals and finger sticks overnight. This morning, states that she should be discharged, believes that she is being mistreated.     ADDITIONAL REVIEW OF SYSTEMS:    CONSTITUTIONAL: No weakness, fevers or chills  RESPIRATORY: No cough, wheezing, hemoptysis; No shortness of breath  CARDIOVASCULAR: No chest pain or palpitations; No lower extremity edema  GASTROINTESTINAL: No abdominal or epigastric pain. No nausea, vomiting, or hematemesis; No diarrhea or constipation. No melena or hematochezia.      MEDICATIONS  (STANDING):  budesonide  80 MICROgram(s)/formoterol 4.5 MICROgram(s) Inhaler 2 Puff(s) Inhalation two times a day  dextrose 5%. 1000 milliLiter(s) (50 mL/Hr) IV Continuous <Continuous>  dextrose 50% Injectable 12.5 Gram(s) IV Push once  dextrose 50% Injectable 25 Gram(s) IV Push once  dextrose 50% Injectable 25 Gram(s) IV Push once  heparin  Injectable 5000 Unit(s) SubCutaneous every 8 hours  influenza   Vaccine 0.5 milliLiter(s) IntraMuscular once  insulin glargine Injectable (LANTUS) 26 Unit(s) SubCutaneous <User Schedule>  insulin lispro (HumaLOG) corrective regimen sliding scale   SubCutaneous three times a day before meals  insulin lispro (HumaLOG) corrective regimen sliding scale   SubCutaneous at bedtime  insulin lispro Injectable (HumaLOG) 14 Unit(s) SubCutaneous before breakfast  insulin lispro Injectable (HumaLOG) 12 Unit(s) SubCutaneous before lunch  insulin lispro Injectable (HumaLOG) 10 Unit(s) SubCutaneous before dinner  levothyroxine 175 MICROGram(s) Oral daily  losartan 25 milliGRAM(s) Oral daily  metoprolol tartrate 25 milliGRAM(s) Oral two times a day  multivitamin/minerals/iron Oral Solution (CENTRUM) 15 milliLiter(s) Oral daily  nystatin Powder 1 Application(s) Topical every 12 hours  OLANZapine 2.5 milliGRAM(s) Oral two times a day  pantoprazole    Tablet 40 milliGRAM(s) Oral <User Schedule>  predniSONE   Tablet 15 milliGRAM(s) Oral daily  primidone 50 milliGRAM(s) Oral at bedtime  simvastatin 20 milliGRAM(s) Oral at bedtime  thiamine 100 milliGRAM(s) Oral daily    MEDICATIONS  (PRN):  acetaminophen   Tablet .. 975 milliGRAM(s) Oral every 6 hours PRN Moderate Pain (4 - 6), Severe Pain (7 - 10)  acetaminophen  Suppository .. 650 milliGRAM(s) Rectal every 6 hours PRN Temp greater or equal to 38C (100.4F)  albuterol/ipratropium for Nebulization 3 milliLiter(s) Nebulizer every 6 hours PRN Shortness of Breath and/or Wheezing  dextrose 40% Gel 15 Gram(s) Oral once PRN Blood Glucose LESS THAN 70 milliGRAM(s)/deciLiter  glucagon  Injectable 1 milliGRAM(s) IntraMuscular once PRN Glucose <70 milliGRAM(s)/deciLiter  melatonin 3 milliGRAM(s) Oral at bedtime PRN Insomnia      CAPILLARY BLOOD GLUCOSE      POCT Blood Glucose.: 162 mg/dL (17 Mar 2020 07:56)  POCT Blood Glucose.: 222 mg/dL (16 Mar 2020 17:10)  POCT Blood Glucose.: 149 mg/dL (16 Mar 2020 11:40)    I&O's Summary      PHYSICAL EXAM:  Vital Signs Last 24 Hrs  T(C): 36.7 (17 Mar 2020 07:00), Max: 37 (16 Mar 2020 14:12)  T(F): 98 (17 Mar 2020 07:00), Max: 98.6 (16 Mar 2020 14:12)  HR: 90 (17 Mar 2020 07:00) (90 - 90)  BP: 117/75 (17 Mar 2020 07:00) (117/75 - 117/75)  BP(mean): --  RR: 16 (17 Mar 2020 07:00) (16 - 16)  SpO2: 98% (17 Mar 2020 07:00) (98% - 98%)    Pt refused physical exam.    LABS:                        9.6    10.08 )-----------( 367      ( 17 Mar 2020 10:47 )             33.4     03-17    140  |  103  |  23  ----------------------------<  160<H>  4.0   |  20<L>  |  1.02    Ca    9.6      17 Mar 2020 10:47  Phos  3.4     03-17  Mg     1.8     03-17    TPro  6.9  /  Alb  3.6  /  TBili  0.1<L>  /  DBili  x   /  AST  26  /  ALT  23  /  AlkPhos  98  03-17                RADIOLOGY & ADDITIONAL TESTS:  Results Reviewed:   Imaging Personally Reviewed:  Electrocardiogram Personally Reviewed: Glenn Ortega, PGY1  Pager: [place] [phone], [facility] [phone]    ----------------------------------------------------------------------------------------------------------------------------------------------------------------------------------    PROGRESS NOTE:     Patient is a 64y old  Female who presents with a chief complaint of change in mental status (16 Mar 2020 10:35)      SUBJECTIVE / OVERNIGHT EVENTS: pt refused vitals and finger sticks overnight. This morning, states that she should be discharged, believes that she is being mistreated.     ADDITIONAL REVIEW OF SYSTEMS:    CONSTITUTIONAL: No weakness, fevers or chills  RESPIRATORY: No cough, wheezing, hemoptysis; No shortness of breath  CARDIOVASCULAR: No chest pain or palpitations; No lower extremity edema  GASTROINTESTINAL: No abdominal or epigastric pain. No nausea, vomiting, or hematemesis; No diarrhea or constipation. No melena or hematochezia.      MEDICATIONS  (STANDING):  budesonide  80 MICROgram(s)/formoterol 4.5 MICROgram(s) Inhaler 2 Puff(s) Inhalation two times a day  dextrose 5%. 1000 milliLiter(s) (50 mL/Hr) IV Continuous <Continuous>  dextrose 50% Injectable 12.5 Gram(s) IV Push once  dextrose 50% Injectable 25 Gram(s) IV Push once  dextrose 50% Injectable 25 Gram(s) IV Push once  heparin  Injectable 5000 Unit(s) SubCutaneous every 8 hours  influenza   Vaccine 0.5 milliLiter(s) IntraMuscular once  insulin glargine Injectable (LANTUS) 26 Unit(s) SubCutaneous <User Schedule>  insulin lispro (HumaLOG) corrective regimen sliding scale   SubCutaneous three times a day before meals  insulin lispro (HumaLOG) corrective regimen sliding scale   SubCutaneous at bedtime  insulin lispro Injectable (HumaLOG) 14 Unit(s) SubCutaneous before breakfast  insulin lispro Injectable (HumaLOG) 12 Unit(s) SubCutaneous before lunch  insulin lispro Injectable (HumaLOG) 10 Unit(s) SubCutaneous before dinner  levothyroxine 175 MICROGram(s) Oral daily  losartan 25 milliGRAM(s) Oral daily  metoprolol tartrate 25 milliGRAM(s) Oral two times a day  multivitamin/minerals/iron Oral Solution (CENTRUM) 15 milliLiter(s) Oral daily  nystatin Powder 1 Application(s) Topical every 12 hours  OLANZapine 2.5 milliGRAM(s) Oral two times a day  pantoprazole    Tablet 40 milliGRAM(s) Oral <User Schedule>  predniSONE   Tablet 15 milliGRAM(s) Oral daily  primidone 50 milliGRAM(s) Oral at bedtime  simvastatin 20 milliGRAM(s) Oral at bedtime  thiamine 100 milliGRAM(s) Oral daily    MEDICATIONS  (PRN):  acetaminophen   Tablet .. 975 milliGRAM(s) Oral every 6 hours PRN Moderate Pain (4 - 6), Severe Pain (7 - 10)  acetaminophen  Suppository .. 650 milliGRAM(s) Rectal every 6 hours PRN Temp greater or equal to 38C (100.4F)  albuterol/ipratropium for Nebulization 3 milliLiter(s) Nebulizer every 6 hours PRN Shortness of Breath and/or Wheezing  dextrose 40% Gel 15 Gram(s) Oral once PRN Blood Glucose LESS THAN 70 milliGRAM(s)/deciLiter  glucagon  Injectable 1 milliGRAM(s) IntraMuscular once PRN Glucose <70 milliGRAM(s)/deciLiter  melatonin 3 milliGRAM(s) Oral at bedtime PRN Insomnia      CAPILLARY BLOOD GLUCOSE      POCT Blood Glucose.: 162 mg/dL (17 Mar 2020 07:56)  POCT Blood Glucose.: 222 mg/dL (16 Mar 2020 17:10)  POCT Blood Glucose.: 149 mg/dL (16 Mar 2020 11:40)    I&O's Summary      PHYSICAL EXAM:  Vital Signs Last 24 Hrs  T(C): 36.7 (17 Mar 2020 07:00), Max: 37 (16 Mar 2020 14:12)  T(F): 98 (17 Mar 2020 07:00), Max: 98.6 (16 Mar 2020 14:12)  HR: 90 (17 Mar 2020 07:00) (90 - 90)  BP: 117/75 (17 Mar 2020 07:00) (117/75 - 117/75)  BP(mean): --  RR: 16 (17 Mar 2020 07:00) (16 - 16)  SpO2: 98% (17 Mar 2020 07:00) (98% - 98%)    Pt refused physical exam.    LABS:                        9.6    10.08 )-----------( 367      ( 17 Mar 2020 10:47 )             33.4     03-17    140  |  103  |  23  ----------------------------<  160<H>  4.0   |  20<L>  |  1.02    Ca    9.6      17 Mar 2020 10:47  Phos  3.4     03-17  Mg     1.8     03-17    TPro  6.9  /  Alb  3.6  /  TBili  0.1<L>  /  DBili  x   /  AST  26  /  ALT  23  /  AlkPhos  98  03-17                RADIOLOGY & ADDITIONAL TESTS:  Results Reviewed:   Imaging Personally Reviewed:  Electrocardiogram Personally Reviewed:      Plan of care discussed with following consultants: Psychiatiry, Case management Glenn Ortega, PGY1  Pager: [place] [phone], [facility] [phone]    ----------------------------------------------------------------------------------------------------------------------------------------------------------------------------------    PROGRESS NOTE:     Patient is a 64y old  Female who presents with a chief complaint of change in mental status (16 Mar 2020 10:35)      SUBJECTIVE / OVERNIGHT EVENTS: pt refused vitals and finger sticks overnight. This morning, states that she should be discharged, believes that she is being mistreated.     ADDITIONAL REVIEW OF SYSTEMS:    CONSTITUTIONAL: No weakness, fevers or chills  RESPIRATORY: No cough, wheezing, hemoptysis; No shortness of breath  CARDIOVASCULAR: No chest pain or palpitations; No lower extremity edema  GASTROINTESTINAL: No abdominal or epigastric pain. No nausea, vomiting, or hematemesis; No diarrhea or constipation. No melena or hematochezia.      MEDICATIONS  (STANDING):  budesonide  80 MICROgram(s)/formoterol 4.5 MICROgram(s) Inhaler 2 Puff(s) Inhalation two times a day  dextrose 5%. 1000 milliLiter(s) (50 mL/Hr) IV Continuous <Continuous>  dextrose 50% Injectable 12.5 Gram(s) IV Push once  dextrose 50% Injectable 25 Gram(s) IV Push once  dextrose 50% Injectable 25 Gram(s) IV Push once  heparin  Injectable 5000 Unit(s) SubCutaneous every 8 hours  influenza   Vaccine 0.5 milliLiter(s) IntraMuscular once  insulin glargine Injectable (LANTUS) 26 Unit(s) SubCutaneous <User Schedule>  insulin lispro (HumaLOG) corrective regimen sliding scale   SubCutaneous three times a day before meals  insulin lispro (HumaLOG) corrective regimen sliding scale   SubCutaneous at bedtime  insulin lispro Injectable (HumaLOG) 14 Unit(s) SubCutaneous before breakfast  insulin lispro Injectable (HumaLOG) 12 Unit(s) SubCutaneous before lunch  insulin lispro Injectable (HumaLOG) 10 Unit(s) SubCutaneous before dinner  levothyroxine 175 MICROGram(s) Oral daily  losartan 25 milliGRAM(s) Oral daily  metoprolol tartrate 25 milliGRAM(s) Oral two times a day  multivitamin/minerals/iron Oral Solution (CENTRUM) 15 milliLiter(s) Oral daily  nystatin Powder 1 Application(s) Topical every 12 hours  OLANZapine 2.5 milliGRAM(s) Oral two times a day  pantoprazole    Tablet 40 milliGRAM(s) Oral <User Schedule>  predniSONE   Tablet 15 milliGRAM(s) Oral daily  primidone 50 milliGRAM(s) Oral at bedtime  simvastatin 20 milliGRAM(s) Oral at bedtime  thiamine 100 milliGRAM(s) Oral daily    MEDICATIONS  (PRN):  acetaminophen   Tablet .. 975 milliGRAM(s) Oral every 6 hours PRN Moderate Pain (4 - 6), Severe Pain (7 - 10)  acetaminophen  Suppository .. 650 milliGRAM(s) Rectal every 6 hours PRN Temp greater or equal to 38C (100.4F)  albuterol/ipratropium for Nebulization 3 milliLiter(s) Nebulizer every 6 hours PRN Shortness of Breath and/or Wheezing  dextrose 40% Gel 15 Gram(s) Oral once PRN Blood Glucose LESS THAN 70 milliGRAM(s)/deciLiter  glucagon  Injectable 1 milliGRAM(s) IntraMuscular once PRN Glucose <70 milliGRAM(s)/deciLiter  melatonin 3 milliGRAM(s) Oral at bedtime PRN Insomnia      CAPILLARY BLOOD GLUCOSE      POCT Blood Glucose.: 162 mg/dL (17 Mar 2020 07:56)  POCT Blood Glucose.: 222 mg/dL (16 Mar 2020 17:10)  POCT Blood Glucose.: 149 mg/dL (16 Mar 2020 11:40)    I&O's Summary      PHYSICAL EXAM:  Vital Signs Last 24 Hrs  T(C): 36.7 (17 Mar 2020 07:00), Max: 37 (16 Mar 2020 14:12)  T(F): 98 (17 Mar 2020 07:00), Max: 98.6 (16 Mar 2020 14:12)  HR: 90 (17 Mar 2020 07:00) (90 - 90)  BP: 117/75 (17 Mar 2020 07:00) (117/75 - 117/75)  BP(mean): --  RR: 16 (17 Mar 2020 07:00) (16 - 16)  SpO2: 98% (17 Mar 2020 07:00) (98% - 98%)    Pt refused physical exam.    LABS:                        9.6    10.08 )-----------( 367      ( 17 Mar 2020 10:47 )             33.4     03-17    140  |  103  |  23  ----------------------------<  160<H>  4.0   |  20<L>  |  1.02    Ca    9.6      17 Mar 2020 10:47  Phos  3.4     03-17  Mg     1.8     03-17    TPro  6.9  /  Alb  3.6  /  TBili  0.1<L>  /  DBili  x   /  AST  26  /  ALT  23  /  AlkPhos  98  03-17                RADIOLOGY & ADDITIONAL TESTS:  Results Reviewed:   Imaging Personally Reviewed:  Electrocardiogram Personally Reviewed:      Plan of care discussed with following consultants: Psychiatiry, Endocrine, Case management

## 2020-03-17 NOTE — PROGRESS NOTE ADULT - ASSESSMENT
64F w/ a PMHx arthritis, hypothyroidism, pulmonary HTN, DM on insulin, HTN, COPD/CHRIS on noctournal CPAP and 3L home), RA on chronic prednisone, chronic tremors, SBO s/p ex lap with lysis of adhesions c/b evisceration p/w agitation 3 weeks of unclear etiology, found to be significantly hypothyroid (), MRI brain found to have small right sided SDH, hospital course c/b catatonia improved with Ativan. Course recently c/b DKA. Pending optimization of psychiatric regimen.

## 2020-03-17 NOTE — PROGRESS NOTE ADULT - PROBLEM SELECTOR PLAN 2
-Pt currently on PO Synthroid.   -Please administer Synthroid 87.5 mcg IVP whenever pt refuses PO dose   -C/w multivitamins and Protonix administratio at 12 noon instead of am  -Re test Free T4 and Total T3 3/20/20. No need to test TSH since it will still be elevated.   -Plan discussed with pt's team.  Contact info: [phone] (24/7). pager [phone]

## 2020-03-17 NOTE — PROGRESS NOTE ADULT - SUBJECTIVE AND OBJECTIVE BOX
DIABETES FOLLOW UP NOTE: Saw pt earlier today  INTERVAL HX: 63 y/o F w/h/o uncontrolled T2DM with unknown complications. Also h/o functional paraplegia, hypothyroidism, pulmonary hypertension, COPD, RA on chronic prednisone, SBO s/p ex lap with lysis of adhesions c/b multifocal pneumonia with AMS of unknown etiology. Pt was on catatonic state that resolved with ativan administration and is now more alert but still confused and disoriented. Refusing meds/treatments on and off. Glycemic control fair with intermittent hyperglycemia depending on PO intake and if pt takes insulin as ordered. Taking synthroid and Prednisone more consistently now. No hypoglycemia. Noted pt refused BG testing at hs and also didn't get lunch time insulin ac lunch yesterday and today because she refused to eat. However, pt had rebound hyperglycemia yesterday ac dinner. Unable to determine if she ate anything between lunch and dinner.     Review of Systems:  General: Unable to evaluate today since pt states that I'm not her doctor and she doesn't need to talk to me at this time. Also states she never had diabetes so she doesn't know why I'm seeing her.      Allergies    Depakote (Other)  Seroquel (Other (Severe))    Intolerances      MEDICATIONS:  insulin glargine Injectable (LANTUS) 26 Unit(s) SubCutaneous <User Schedule>  insulin lispro (HumaLOG) corrective regimen sliding scale   SubCutaneous three times a day before meals  insulin lispro (HumaLOG) corrective regimen sliding scale   SubCutaneous at bedtime  insulin lispro Injectable (HumaLOG) 14 Unit(s) SubCutaneous before breakfast  insulin lispro Injectable (HumaLOG) 12 Unit(s) SubCutaneous before lunch  insulin lispro Injectable (HumaLOG) 10 Unit(s) SubCutaneous before dinner  levothyroxine 175 MICROGram(s) Oral daily  predniSONE   Tablet 15 milliGRAM(s) Oral daily  simvastatin 20 milliGRAM(s) Oral at bedtime        PHYSICAL EXAM:  VITALS: T(C): 36.7 (03-17-20 @ 14:16)  T(F): 98.1 (03-17-20 @ 14:16), Max: 98.1 (03-17-20 @ 14:16)  HR: 94 (03-17-20 @ 14:16) (90 - 94)  BP: 118/69 (03-17-20 @ 14:16) (117/75 - 118/69)  RR:  (16 - 16)  SpO2:  (96% - 98%)  Wt(kg): --  GENERAL: Female sitting in chair in NAD  Abdomen: Soft, nontender, non distended, obese  Extremities: Warm, no edema in all 4 exts  NEURO: Alert/confused/disoriented    LABS:  POCT Blood Glucose.: 183 mg/dL (03-17-20 @ 12:46)  POCT Blood Glucose.: 162 mg/dL (03-17-20 @ 07:56)  POCT Blood Glucose.: 222 mg/dL (03-16-20 @ 17:10)  POCT Blood Glucose.: 149 mg/dL (03-16-20 @ 11:40)  POCT Blood Glucose.: 209 mg/dL (03-16-20 @ 08:25)  POCT Blood Glucose.: 175 mg/dL (03-15-20 @ 22:07)  POCT Blood Glucose.: 181 mg/dL (03-15-20 @ 18:01)  POCT Blood Glucose.: 262 mg/dL (03-15-20 @ 13:21)  POCT Blood Glucose.: 141 mg/dL (03-15-20 @ 07:47)  POCT Blood Glucose.: 74 mg/dL (03-14-20 @ 22:56)  POCT Blood Glucose.: 65 mg/dL (03-14-20 @ 22:25)  POCT Blood Glucose.: 159 mg/dL (03-14-20 @ 18:36)                            9.6    10.08 )-----------( 367      ( 17 Mar 2020 10:47 )             33.4       03-17    140  |  103  |  23  ----------------------------<  160<H>  4.0   |  20<L>  |  1.02    EGFR if : 68  EGFR if non : 59<L>    Ca    9.6      03-17  Mg     1.8     03-17  Phos  3.4     03-17    TPro  6.9  /  Alb  3.6  /  TBili  0.1<L>  /  DBili  x   /  AST  26  /  ALT  23  /  AlkPhos  98  03-17      Thyroid Function Tests:  03-17 @ 14:52 TSH 13.20 FreeT4 -- T3 -- Anti TPO -- Anti Thyroglobulin Ab -- TSI --  02-23 @ 13:25 TSH -- FreeT4 -- T3 53 Anti TPO -- Anti Thyroglobulin Ab -- TSI --      Hemoglobin A1C, Whole Blood: 9.0 % <H> [4.0 - 5.6] (01-28-20 @ 19:30)  Hemoglobin A1C, Whole Blood: 9.2 % <H> [4.0 - 5.6] (01-27-20 @ 17:36)

## 2020-03-17 NOTE — PROGRESS NOTE ADULT - PROBLEM SELECTOR PLAN 2
#DKA- resolved  - continue with moderate ISS  -lantus 26U qdaily  -14 units humalog for breakfast.  -12 units humalog for lunch   -10 units humalog for dinner.   -endocrine team following.  -patient cannot eat without pre meal insulin as she may go into DKA

## 2020-03-18 LAB
ALBUMIN SERPL ELPH-MCNC: 3.6 G/DL — SIGNIFICANT CHANGE UP (ref 3.3–5)
ALP SERPL-CCNC: 107 U/L — SIGNIFICANT CHANGE UP (ref 40–120)
ALT FLD-CCNC: 23 U/L — SIGNIFICANT CHANGE UP (ref 10–45)
ANION GAP SERPL CALC-SCNC: 15 MMOL/L — SIGNIFICANT CHANGE UP (ref 5–17)
AST SERPL-CCNC: 22 U/L — SIGNIFICANT CHANGE UP (ref 10–40)
BASOPHILS # BLD AUTO: 0.06 K/UL — SIGNIFICANT CHANGE UP (ref 0–0.2)
BASOPHILS NFR BLD AUTO: 0.6 % — SIGNIFICANT CHANGE UP (ref 0–2)
BILIRUB SERPL-MCNC: 0.1 MG/DL — LOW (ref 0.2–1.2)
BUN SERPL-MCNC: 25 MG/DL — HIGH (ref 7–23)
CALCIUM SERPL-MCNC: 9.6 MG/DL — SIGNIFICANT CHANGE UP (ref 8.4–10.5)
CHLORIDE SERPL-SCNC: 106 MMOL/L — SIGNIFICANT CHANGE UP (ref 96–108)
CO2 SERPL-SCNC: 20 MMOL/L — LOW (ref 22–31)
CREAT SERPL-MCNC: 1 MG/DL — SIGNIFICANT CHANGE UP (ref 0.5–1.3)
EOSINOPHIL # BLD AUTO: 0.24 K/UL — SIGNIFICANT CHANGE UP (ref 0–0.5)
EOSINOPHIL NFR BLD AUTO: 2.4 % — SIGNIFICANT CHANGE UP (ref 0–6)
GLUCOSE BLDC GLUCOMTR-MCNC: 103 MG/DL — HIGH (ref 70–99)
GLUCOSE BLDC GLUCOMTR-MCNC: 170 MG/DL — HIGH (ref 70–99)
GLUCOSE BLDC GLUCOMTR-MCNC: 175 MG/DL — HIGH (ref 70–99)
GLUCOSE BLDC GLUCOMTR-MCNC: 226 MG/DL — HIGH (ref 70–99)
GLUCOSE SERPL-MCNC: 176 MG/DL — HIGH (ref 70–99)
HCT VFR BLD CALC: 32.6 % — LOW (ref 34.5–45)
HGB BLD-MCNC: 9.4 G/DL — LOW (ref 11.5–15.5)
IMM GRANULOCYTES NFR BLD AUTO: 1.3 % — SIGNIFICANT CHANGE UP (ref 0–1.5)
LYMPHOCYTES # BLD AUTO: 4.07 K/UL — HIGH (ref 1–3.3)
LYMPHOCYTES # BLD AUTO: 41.3 % — SIGNIFICANT CHANGE UP (ref 13–44)
MAGNESIUM SERPL-MCNC: 1.9 MG/DL — SIGNIFICANT CHANGE UP (ref 1.6–2.6)
MCHC RBC-ENTMCNC: 27.6 PG — SIGNIFICANT CHANGE UP (ref 27–34)
MCHC RBC-ENTMCNC: 28.8 GM/DL — LOW (ref 32–36)
MCV RBC AUTO: 95.9 FL — SIGNIFICANT CHANGE UP (ref 80–100)
MONOCYTES # BLD AUTO: 1.08 K/UL — HIGH (ref 0–0.9)
MONOCYTES NFR BLD AUTO: 11 % — SIGNIFICANT CHANGE UP (ref 2–14)
NEUTROPHILS # BLD AUTO: 4.27 K/UL — SIGNIFICANT CHANGE UP (ref 1.8–7.4)
NEUTROPHILS NFR BLD AUTO: 43.4 % — SIGNIFICANT CHANGE UP (ref 43–77)
NRBC # BLD: 0 /100 WBCS — SIGNIFICANT CHANGE UP (ref 0–0)
PHOSPHATE SERPL-MCNC: 3.6 MG/DL — SIGNIFICANT CHANGE UP (ref 2.5–4.5)
PLATELET # BLD AUTO: 393 K/UL — SIGNIFICANT CHANGE UP (ref 150–400)
POTASSIUM SERPL-MCNC: 4.2 MMOL/L — SIGNIFICANT CHANGE UP (ref 3.5–5.3)
POTASSIUM SERPL-SCNC: 4.2 MMOL/L — SIGNIFICANT CHANGE UP (ref 3.5–5.3)
PROT SERPL-MCNC: 6.9 G/DL — SIGNIFICANT CHANGE UP (ref 6–8.3)
RBC # BLD: 3.4 M/UL — LOW (ref 3.8–5.2)
RBC # FLD: 15.6 % — HIGH (ref 10.3–14.5)
SODIUM SERPL-SCNC: 141 MMOL/L — SIGNIFICANT CHANGE UP (ref 135–145)
WBC # BLD: 9.85 K/UL — SIGNIFICANT CHANGE UP (ref 3.8–10.5)
WBC # FLD AUTO: 9.85 K/UL — SIGNIFICANT CHANGE UP (ref 3.8–10.5)

## 2020-03-18 PROCEDURE — 99233 SBSQ HOSP IP/OBS HIGH 50: CPT | Mod: GC

## 2020-03-18 PROCEDURE — 99233 SBSQ HOSP IP/OBS HIGH 50: CPT

## 2020-03-18 RX ORDER — MULTIVIT-MIN/FERROUS GLUCONATE 9 MG/15 ML
15 LIQUID (ML) ORAL DAILY
Refills: 0 | Status: DISCONTINUED | OUTPATIENT
Start: 2020-03-18 | End: 2020-03-27

## 2020-03-18 RX ORDER — TRAMADOL HYDROCHLORIDE 50 MG/1
25 TABLET ORAL ONCE
Refills: 0 | Status: DISCONTINUED | OUTPATIENT
Start: 2020-03-18 | End: 2020-03-18

## 2020-03-18 RX ORDER — TRAMADOL HYDROCHLORIDE 50 MG/1
25 TABLET ORAL ONCE
Refills: 0 | Status: DISCONTINUED | OUTPATIENT
Start: 2020-03-18 | End: 2020-03-20

## 2020-03-18 RX ADMIN — OLANZAPINE 2.5 MILLIGRAM(S): 15 TABLET, FILM COATED ORAL at 07:35

## 2020-03-18 RX ADMIN — Medication 25 MILLIGRAM(S): at 18:05

## 2020-03-18 RX ADMIN — Medication 175 MICROGRAM(S): at 07:35

## 2020-03-18 RX ADMIN — TRAMADOL HYDROCHLORIDE 25 MILLIGRAM(S): 50 TABLET ORAL at 09:30

## 2020-03-18 RX ADMIN — Medication 975 MILLIGRAM(S): at 12:00

## 2020-03-18 RX ADMIN — Medication 2: at 11:58

## 2020-03-18 RX ADMIN — Medication 12 UNIT(S): at 11:59

## 2020-03-18 RX ADMIN — TRAMADOL HYDROCHLORIDE 25 MILLIGRAM(S): 50 TABLET ORAL at 08:23

## 2020-03-18 RX ADMIN — INSULIN GLARGINE 26 UNIT(S): 100 INJECTION, SOLUTION SUBCUTANEOUS at 08:28

## 2020-03-18 RX ADMIN — Medication 14 UNIT(S): at 08:27

## 2020-03-18 RX ADMIN — HEPARIN SODIUM 5000 UNIT(S): 5000 INJECTION INTRAVENOUS; SUBCUTANEOUS at 18:04

## 2020-03-18 RX ADMIN — HEPARIN SODIUM 5000 UNIT(S): 5000 INJECTION INTRAVENOUS; SUBCUTANEOUS at 10:02

## 2020-03-18 RX ADMIN — Medication 25 MILLIGRAM(S): at 07:34

## 2020-03-18 RX ADMIN — Medication 10 UNIT(S): at 18:28

## 2020-03-18 RX ADMIN — OLANZAPINE 2.5 MILLIGRAM(S): 15 TABLET, FILM COATED ORAL at 18:05

## 2020-03-18 RX ADMIN — HEPARIN SODIUM 5000 UNIT(S): 5000 INJECTION INTRAVENOUS; SUBCUTANEOUS at 02:34

## 2020-03-18 RX ADMIN — Medication 4: at 18:27

## 2020-03-18 RX ADMIN — PANTOPRAZOLE SODIUM 40 MILLIGRAM(S): 20 TABLET, DELAYED RELEASE ORAL at 11:47

## 2020-03-18 RX ADMIN — LOSARTAN POTASSIUM 25 MILLIGRAM(S): 100 TABLET, FILM COATED ORAL at 07:35

## 2020-03-18 RX ADMIN — Medication 975 MILLIGRAM(S): at 10:56

## 2020-03-18 RX ADMIN — Medication 15 MILLIGRAM(S): at 07:34

## 2020-03-18 RX ADMIN — Medication 15 MILLILITER(S): at 11:47

## 2020-03-18 RX ADMIN — BUDESONIDE AND FORMOTEROL FUMARATE DIHYDRATE 2 PUFF(S): 160; 4.5 AEROSOL RESPIRATORY (INHALATION) at 05:42

## 2020-03-18 RX ADMIN — Medication 100 MILLIGRAM(S): at 11:47

## 2020-03-18 RX ADMIN — Medication 2: at 08:27

## 2020-03-18 RX ADMIN — NYSTATIN CREAM 1 APPLICATION(S): 100000 CREAM TOPICAL at 07:37

## 2020-03-18 NOTE — PROGRESS NOTE BEHAVIORAL HEALTH - RISK ASSESSMENT
low, pt denies si/hi, however exhibits mood lability symptoms. no hx of self injurious behaviors or suicide attempts, also confirmed by family.
will determine once delirium clears
low, pt denies si/hi, however exhibits mood lability symptoms. no hx of self injurious behaviors or suicide attempts, also confirmed by family.
low, pt denies si/hi, however exhibits mood lability symptoms. no hx of self injurious behaviors or suicide attempts, also confirmed by family.

## 2020-03-18 NOTE — PROGRESS NOTE BEHAVIORAL HEALTH - THOUGHT PROCESS
Disorganized/Illogical/Impaired reasoning
Impaired reasoning
Perseverative/Impaired reasoning
Disorganized/Illogical/Impaired reasoning
Impaired reasoning/Disorganized
Impaired reasoning/Tangential
Impaired reasoning/Tangential
Perseverative/Impaired reasoning
Impaired reasoning
Impaired reasoning/Disorganized
Disorganized/Illogical/Impaired reasoning
Impaired reasoning
Impaired reasoning/Disorganized
Impaired reasoning/Perseverative
Perseverative/Impaired reasoning
Tangential/Impaired reasoning
Impaired reasoning/Disorganized
Impaired reasoning/Tangential
Illogical/Disorganized
Disorganized/Impaired reasoning
Impaired reasoning/Disorganized

## 2020-03-18 NOTE — PROGRESS NOTE ADULT - PROBLEM SELECTOR PLAN 10
- DVT ppx: HSQ q8hrs   - Diet: speech soft diet  - Dispo: [facility] pending repeat PT eval vs home with home care     Transitions of Care Status:  1.  Name of PCP:  Kristie Nails MD (PCP)  2.  PCP Contacted on Admission: [ ] Y    [x] N    3.  PCP contacted at Discharge: [ ] Y    [ ] N    [ ] N/A  4.  Post-Discharge Appointment Date and Location:  5.  Summary of Handoff given to PCP:

## 2020-03-18 NOTE — PROGRESS NOTE BEHAVIORAL HEALTH - ESTIMATED INTELLIGENCE
Other
Average
Other
Average

## 2020-03-18 NOTE — PROGRESS NOTE BEHAVIORAL HEALTH - AFFECT RANGE
Labile
Constricted
Full
Constricted
Constricted
Labile
Blunted
Labile
Constricted
Full
Constricted
Full
Constricted
Labile
Labile

## 2020-03-18 NOTE — PROGRESS NOTE BEHAVIORAL HEALTH - NS ED BHA MSE SPEECH SPONTANEITY
Normal
Other
Increased latency
Normal
Increased latency
Normal

## 2020-03-18 NOTE — PROGRESS NOTE ADULT - SUBJECTIVE AND OBJECTIVE BOX
Glenn Ortega, PGY1  Pager: [place] [phone], [place] [phone]    ----------------------------------------------------------------------------------------------------------------------------------------------------------------------------------    PROGRESS NOTE:     Patient is a 64y old  Female who presents with a chief complaint of change in mental status (17 Mar 2020 16:19)      SUBJECTIVE / OVERNIGHT EVENTS:    ADDITIONAL REVIEW OF SYSTEMS:    MEDICATIONS  (STANDING):  budesonide  80 MICROgram(s)/formoterol 4.5 MICROgram(s) Inhaler 2 Puff(s) Inhalation two times a day  dextrose 5%. 1000 milliLiter(s) (50 mL/Hr) IV Continuous <Continuous>  dextrose 50% Injectable 12.5 Gram(s) IV Push once  dextrose 50% Injectable 25 Gram(s) IV Push once  dextrose 50% Injectable 25 Gram(s) IV Push once  heparin  Injectable 5000 Unit(s) SubCutaneous every 8 hours  influenza   Vaccine 0.5 milliLiter(s) IntraMuscular once  insulin glargine Injectable (LANTUS) 26 Unit(s) SubCutaneous <User Schedule>  insulin lispro (HumaLOG) corrective regimen sliding scale   SubCutaneous three times a day before meals  insulin lispro (HumaLOG) corrective regimen sliding scale   SubCutaneous at bedtime  insulin lispro Injectable (HumaLOG) 14 Unit(s) SubCutaneous before breakfast  insulin lispro Injectable (HumaLOG) 12 Unit(s) SubCutaneous before lunch  insulin lispro Injectable (HumaLOG) 10 Unit(s) SubCutaneous before dinner  levothyroxine 175 MICROGram(s) Oral daily  losartan 25 milliGRAM(s) Oral daily  metoprolol tartrate 25 milliGRAM(s) Oral two times a day  multivitamin/minerals/iron Oral Solution (CENTRUM) 15 milliLiter(s) Oral daily  nystatin Powder 1 Application(s) Topical every 12 hours  OLANZapine 2.5 milliGRAM(s) Oral two times a day  pantoprazole    Tablet 40 milliGRAM(s) Oral <User Schedule>  predniSONE   Tablet 15 milliGRAM(s) Oral daily  primidone 50 milliGRAM(s) Oral at bedtime  simvastatin 20 milliGRAM(s) Oral at bedtime  thiamine 100 milliGRAM(s) Oral daily    MEDICATIONS  (PRN):  acetaminophen   Tablet .. 975 milliGRAM(s) Oral every 6 hours PRN Moderate Pain (4 - 6), Severe Pain (7 - 10)  acetaminophen  Suppository .. 650 milliGRAM(s) Rectal every 6 hours PRN Temp greater or equal to 38C (100.4F)  albuterol/ipratropium for Nebulization 3 milliLiter(s) Nebulizer every 6 hours PRN Shortness of Breath and/or Wheezing  dextrose 40% Gel 15 Gram(s) Oral once PRN Blood Glucose LESS THAN 70 milliGRAM(s)/deciLiter  glucagon  Injectable 1 milliGRAM(s) IntraMuscular once PRN Glucose <70 milliGRAM(s)/deciLiter  melatonin 3 milliGRAM(s) Oral at bedtime PRN Insomnia      CAPILLARY BLOOD GLUCOSE      POCT Blood Glucose.: 170 mg/dL (18 Mar 2020 08:26)  POCT Blood Glucose.: 182 mg/dL (17 Mar 2020 17:47)  POCT Blood Glucose.: 183 mg/dL (17 Mar 2020 12:46)    I&O's Summary      PHYSICAL EXAM:  Vital Signs Last 24 Hrs  T(C): 37 (17 Mar 2020 22:18), Max: 37 (17 Mar 2020 22:18)  T(F): 98.6 (17 Mar 2020 22:18), Max: 98.6 (17 Mar 2020 22:18)  HR: 87 (18 Mar 2020 05:44) (87 - 94)  BP: 102/65 (17 Mar 2020 22:18) (102/65 - 118/69)  BP(mean): --  RR: 16 (17 Mar 2020 22:18) (16 - 16)  SpO2: 99% (18 Mar 2020 05:44) (96% - 99%)    CONSTITUTIONAL: NAD, well-developed  RESPIRATORY: Normal respiratory effort; lungs are clear to auscultation bilaterally  CARDIOVASCULAR: normal S1 and S2, rrr, no mrg; No lower extremity edema; Peripheral pulses are 2+ bilaterally  ABDOMEN: soft, NTND, BS+; No hepatosplenomegaly  MUSCULOSKELETAL: no clubbing or cyanosis of digits; no joint swelling or tenderness to palpation  PSYCH: A+O to person, place, and time; affect appropriate    LABS:                        9.4    9.85  )-----------( 393      ( 18 Mar 2020 08:11 )             32.6     03-18    141  |  106  |  25<H>  ----------------------------<  176<H>  4.2   |  20<L>  |  1.00    Ca    9.6      18 Mar 2020 08:11  Phos  3.6     03-18  Mg     1.9     03-18    TPro  6.9  /  Alb  3.6  /  TBili  0.1<L>  /  DBili  x   /  AST  22  /  ALT  23  /  AlkPhos  107  03-18                RADIOLOGY & ADDITIONAL TESTS:  Results Reviewed:   Imaging Personally Reviewed:  Electrocardiogram Personally Reviewed: Glenn Ortega, PGY1  Pager: [place] [phone], [facility] [phone]    ----------------------------------------------------------------------------------------------------------------------------------------------------------------------------------    PROGRESS NOTE:     Patient is a 64y old  Female who presents with a chief complaint of change in mental status (17 Mar 2020 16:19)      SUBJECTIVE / OVERNIGHT EVENTS: no acute events overnight. Pt was argumentative overnight, believes that she should be discharged.     ADDITIONAL REVIEW OF SYSTEMS:  CONSTITUTIONAL: No weakness, fevers or chills  RESPIRATORY: No cough, wheezing, hemoptysis; No shortness of breath  CARDIOVASCULAR: No chest pain or palpitations; No lower extremity edema  GASTROINTESTINAL: No abdominal or epigastric pain. No nausea, vomiting, or hematemesis; No diarrhea or constipation. No melena or hematochezia.    MEDICATIONS  (STANDING):  budesonide  80 MICROgram(s)/formoterol 4.5 MICROgram(s) Inhaler 2 Puff(s) Inhalation two times a day  dextrose 5%. 1000 milliLiter(s) (50 mL/Hr) IV Continuous <Continuous>  dextrose 50% Injectable 12.5 Gram(s) IV Push once  dextrose 50% Injectable 25 Gram(s) IV Push once  dextrose 50% Injectable 25 Gram(s) IV Push once  heparin  Injectable 5000 Unit(s) SubCutaneous every 8 hours  influenza   Vaccine 0.5 milliLiter(s) IntraMuscular once  insulin glargine Injectable (LANTUS) 26 Unit(s) SubCutaneous <User Schedule>  insulin lispro (HumaLOG) corrective regimen sliding scale   SubCutaneous three times a day before meals  insulin lispro (HumaLOG) corrective regimen sliding scale   SubCutaneous at bedtime  insulin lispro Injectable (HumaLOG) 14 Unit(s) SubCutaneous before breakfast  insulin lispro Injectable (HumaLOG) 12 Unit(s) SubCutaneous before lunch  insulin lispro Injectable (HumaLOG) 10 Unit(s) SubCutaneous before dinner  levothyroxine 175 MICROGram(s) Oral daily  losartan 25 milliGRAM(s) Oral daily  metoprolol tartrate 25 milliGRAM(s) Oral two times a day  multivitamin/minerals/iron Oral Solution (CENTRUM) 15 milliLiter(s) Oral daily  nystatin Powder 1 Application(s) Topical every 12 hours  OLANZapine 2.5 milliGRAM(s) Oral two times a day  pantoprazole    Tablet 40 milliGRAM(s) Oral <User Schedule>  predniSONE   Tablet 15 milliGRAM(s) Oral daily  primidone 50 milliGRAM(s) Oral at bedtime  simvastatin 20 milliGRAM(s) Oral at bedtime  thiamine 100 milliGRAM(s) Oral daily    MEDICATIONS  (PRN):  acetaminophen   Tablet .. 975 milliGRAM(s) Oral every 6 hours PRN Moderate Pain (4 - 6), Severe Pain (7 - 10)  acetaminophen  Suppository .. 650 milliGRAM(s) Rectal every 6 hours PRN Temp greater or equal to 38C (100.4F)  albuterol/ipratropium for Nebulization 3 milliLiter(s) Nebulizer every 6 hours PRN Shortness of Breath and/or Wheezing  dextrose 40% Gel 15 Gram(s) Oral once PRN Blood Glucose LESS THAN 70 milliGRAM(s)/deciLiter  glucagon  Injectable 1 milliGRAM(s) IntraMuscular once PRN Glucose <70 milliGRAM(s)/deciLiter  melatonin 3 milliGRAM(s) Oral at bedtime PRN Insomnia      CAPILLARY BLOOD GLUCOSE      POCT Blood Glucose.: 170 mg/dL (18 Mar 2020 08:26)  POCT Blood Glucose.: 182 mg/dL (17 Mar 2020 17:47)  POCT Blood Glucose.: 183 mg/dL (17 Mar 2020 12:46)    I&O's Summary      PHYSICAL EXAM:  Vital Signs Last 24 Hrs  T(C): 37 (17 Mar 2020 22:18), Max: 37 (17 Mar 2020 22:18)  T(F): 98.6 (17 Mar 2020 22:18), Max: 98.6 (17 Mar 2020 22:18)  HR: 87 (18 Mar 2020 05:44) (87 - 94)  BP: 102/65 (17 Mar 2020 22:18) (102/65 - 118/69)  BP(mean): --  RR: 16 (17 Mar 2020 22:18) (16 - 16)  SpO2: 99% (18 Mar 2020 05:44) (96% - 99%)    Pt refused physical exam.    LABS:                        9.4    9.85  )-----------( 393      ( 18 Mar 2020 08:11 )             32.6     03-18    141  |  106  |  25<H>  ----------------------------<  176<H>  4.2   |  20<L>  |  1.00    Ca    9.6      18 Mar 2020 08:11  Phos  3.6     03-18  Mg     1.9     03-18    TPro  6.9  /  Alb  3.6  /  TBili  0.1<L>  /  DBili  x   /  AST  22  /  ALT  23  /  AlkPhos  107  03-18                RADIOLOGY & ADDITIONAL TESTS:  Results Reviewed:   Imaging Personally Reviewed:  Electrocardiogram Personally Reviewed: Glenn Ortega, PGY1  Pager: [place] [phone], [facility] [phone]    ----------------------------------------------------------------------------------------------------------------------------------------------------------------------------------    PROGRESS NOTE:     Patient is a 64y old  Female who presents with a chief complaint of change in mental status (17 Mar 2020 16:19)      SUBJECTIVE / OVERNIGHT EVENTS: no acute events overnight. Pt was argumentative overnight, believes that she should be discharged.     ADDITIONAL REVIEW OF SYSTEMS:  CONSTITUTIONAL: No weakness, fevers or chills  RESPIRATORY: No cough, wheezing, hemoptysis; No shortness of breath  CARDIOVASCULAR: No chest pain or palpitations; No lower extremity edema  GASTROINTESTINAL: No abdominal or epigastric pain. No nausea, vomiting, or hematemesis; No diarrhea or constipation. No melena or hematochezia.    MEDICATIONS  (STANDING):  budesonide  80 MICROgram(s)/formoterol 4.5 MICROgram(s) Inhaler 2 Puff(s) Inhalation two times a day  dextrose 5%. 1000 milliLiter(s) (50 mL/Hr) IV Continuous <Continuous>  dextrose 50% Injectable 12.5 Gram(s) IV Push once  dextrose 50% Injectable 25 Gram(s) IV Push once  dextrose 50% Injectable 25 Gram(s) IV Push once  heparin  Injectable 5000 Unit(s) SubCutaneous every 8 hours  influenza   Vaccine 0.5 milliLiter(s) IntraMuscular once  insulin glargine Injectable (LANTUS) 26 Unit(s) SubCutaneous <User Schedule>  insulin lispro (HumaLOG) corrective regimen sliding scale   SubCutaneous three times a day before meals  insulin lispro (HumaLOG) corrective regimen sliding scale   SubCutaneous at bedtime  insulin lispro Injectable (HumaLOG) 14 Unit(s) SubCutaneous before breakfast  insulin lispro Injectable (HumaLOG) 12 Unit(s) SubCutaneous before lunch  insulin lispro Injectable (HumaLOG) 10 Unit(s) SubCutaneous before dinner  levothyroxine 175 MICROGram(s) Oral daily  losartan 25 milliGRAM(s) Oral daily  metoprolol tartrate 25 milliGRAM(s) Oral two times a day  multivitamin/minerals/iron Oral Solution (CENTRUM) 15 milliLiter(s) Oral daily  nystatin Powder 1 Application(s) Topical every 12 hours  OLANZapine 2.5 milliGRAM(s) Oral two times a day  pantoprazole    Tablet 40 milliGRAM(s) Oral <User Schedule>  predniSONE   Tablet 15 milliGRAM(s) Oral daily  primidone 50 milliGRAM(s) Oral at bedtime  simvastatin 20 milliGRAM(s) Oral at bedtime  thiamine 100 milliGRAM(s) Oral daily    MEDICATIONS  (PRN):  acetaminophen   Tablet .. 975 milliGRAM(s) Oral every 6 hours PRN Moderate Pain (4 - 6), Severe Pain (7 - 10)  acetaminophen  Suppository .. 650 milliGRAM(s) Rectal every 6 hours PRN Temp greater or equal to 38C (100.4F)  albuterol/ipratropium for Nebulization 3 milliLiter(s) Nebulizer every 6 hours PRN Shortness of Breath and/or Wheezing  dextrose 40% Gel 15 Gram(s) Oral once PRN Blood Glucose LESS THAN 70 milliGRAM(s)/deciLiter  glucagon  Injectable 1 milliGRAM(s) IntraMuscular once PRN Glucose <70 milliGRAM(s)/deciLiter  melatonin 3 milliGRAM(s) Oral at bedtime PRN Insomnia      CAPILLARY BLOOD GLUCOSE      POCT Blood Glucose.: 170 mg/dL (18 Mar 2020 08:26)  POCT Blood Glucose.: 182 mg/dL (17 Mar 2020 17:47)  POCT Blood Glucose.: 183 mg/dL (17 Mar 2020 12:46)    I&O's Summary      PHYSICAL EXAM:  Vital Signs Last 24 Hrs  T(C): 37 (17 Mar 2020 22:18), Max: 37 (17 Mar 2020 22:18)  T(F): 98.6 (17 Mar 2020 22:18), Max: 98.6 (17 Mar 2020 22:18)  HR: 87 (18 Mar 2020 05:44) (87 - 94)  BP: 102/65 (17 Mar 2020 22:18) (102/65 - 118/69)  BP(mean): --  RR: 16 (17 Mar 2020 22:18) (16 - 16)  SpO2: 99% (18 Mar 2020 05:44) (96% - 99%)    Pt refused physical exam.    LABS:                        9.4    9.85  )-----------( 393      ( 18 Mar 2020 08:11 )             32.6     03-18    141  |  106  |  25<H>  ----------------------------<  176<H>  4.2   |  20<L>  |  1.00    Ca    9.6      18 Mar 2020 08:11  Phos  3.6     03-18  Mg     1.9     03-18    TPro  6.9  /  Alb  3.6  /  TBili  0.1<L>  /  DBili  x   /  AST  22  /  ALT  23  /  AlkPhos  107  03-18                RADIOLOGY & ADDITIONAL TESTS:  Results Reviewed:   Imaging Personally Reviewed:  Electrocardiogram Personally Reviewed:    Plan of care discussed with PT, CM, Psych

## 2020-03-18 NOTE — PROGRESS NOTE BEHAVIORAL HEALTH - BEHAVIOR
Hostile
Uncooperative
Cooperative
Other
Uncooperative
Uncooperative
Uncooperative/Hostile
Cooperative
Hostile
Other
Other
Uncooperative
Uncooperative
Cooperative
Cooperative
Hostile/Uncooperative
Uncooperative/Hostile
Uncooperative

## 2020-03-18 NOTE — PROGRESS NOTE BEHAVIORAL HEALTH - ABNORMAL MOVEMENTS
Tremors
Tremors/Other
Tremors
Tremors/Other
Tremors
Tremors
Tremors/Other
Tremors
Tremors
Tremors/Other
Tremors
Tremors/Other

## 2020-03-18 NOTE — PROGRESS NOTE BEHAVIORAL HEALTH - NSBHCONSULTFOLLOWAFTERCARE_PSY_A_CORE FT
OP psych f/u at [facility]- [phone]  [facility] clinic- [phone]

## 2020-03-18 NOTE — PROGRESS NOTE BEHAVIORAL HEALTH - NSBHFUPREASONCONS_PSY_A_CORE
agitation
agitation/delerium
agitation/delerium
agitation/delerium/psychosis/med management
agitation/dementia/med management
agitation/med management/delerium
agitation/psychosis/delerium/med management
agitation/psychosis/delerium/med management
darin
delerium/agitation
delerium/agitation
delerium/psychosis/agitation
delerium/psychosis/agitation
med management
med management
med management/delerium
med management/delerium/psychosis/agitation
agitation/delerium/psychosis/med management

## 2020-03-18 NOTE — PROGRESS NOTE BEHAVIORAL HEALTH - NS ED BHA MSE SPEECH ARTICULATION
Normal
Impaired
Normal
Other
Normal
Impaired
Normal

## 2020-03-18 NOTE — PROGRESS NOTE BEHAVIORAL HEALTH - SECONDARY DX1
Cognitive disorder
Mood disorder due to a general medical condition
Cognitive disorder
Cognitive disorder
Mood disorder due to a general medical condition
Catatonia
Cognitive disorder
Cognitive disorder
Mood disorder due to a general medical condition
Catatonia
Mood disorder due to a general medical condition
Catatonia
Cognitive disorder
Cognitive disorder

## 2020-03-18 NOTE — PROGRESS NOTE BEHAVIORAL HEALTH - MUSCLE TONE / STRENGTH
Normal muscle tone/strength
Other
Normal muscle tone/strength
Other
Normal muscle tone/strength
Normal muscle tone/strength
Other
Normal muscle tone/strength
Other
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
## 2020-03-18 NOTE — PROGRESS NOTE BEHAVIORAL HEALTH - ATTENTION / CONCENTRATION
Impaired
Normal
Impaired
Normal
Impaired
Other
Normal

## 2020-03-18 NOTE — PROGRESS NOTE BEHAVIORAL HEALTH - BODY HABITUS
Obese
Well nourished
Obese
Well nourished
Obese
Obese
Well nourished
Obese
Well nourished
Well nourished/Obese
Well nourished/Obese
Obese

## 2020-03-18 NOTE — PROGRESS NOTE BEHAVIORAL HEALTH - REMOTE MEMORY
Normal
Impaired
Normal
Impaired
Normal
Impaired
Normal
Normal
Impaired
Normal
Impaired
Normal
Normal
Impaired
Impaired

## 2020-03-18 NOTE — PROGRESS NOTE BEHAVIORAL HEALTH - RECENT MEMORY
Impaired
Normal
Impaired
Normal
Impaired
Normal
Impaired
Impaired
Normal
Impaired

## 2020-03-18 NOTE — PROGRESS NOTE BEHAVIORAL HEALTH - AFFECT QUALITY
Irritable
Other
Euthymic
Other
Irritable
Other
Euthymic
Other
Euthymic
Irritable

## 2020-03-18 NOTE — PROGRESS NOTE BEHAVIORAL HEALTH - JUDGMENT (REGARDING EVERYDAY EVENTS)
Poor

## 2020-03-18 NOTE — PROGRESS NOTE BEHAVIORAL HEALTH - NS ED BHA MSE SPEECH RATE
Fast, difficult to interrupt
Normal
Fast, difficult to interrupt
Other
Fast, difficult to interrupt
Normal
Slowed
Fast, difficult to interrupt
Normal
Normal/Other
Slowed
Normal
Other
Fast, difficult to interrupt
Fast, difficult to interrupt

## 2020-03-18 NOTE — PROGRESS NOTE BEHAVIORAL HEALTH - NS ED BHA MED ROS SKIN
No complaints
Unable to assess
No complaints
Unable to assess

## 2020-03-18 NOTE — PROGRESS NOTE BEHAVIORAL HEALTH - NSBHCONSULTPRIMARYDISCUSSYES_PSY_A_CORE FT
aware of plan
resident made aware
medical resident aware
resident and attending made aware of above plan
resident made aware
team aware of plan
team aware of plan
resident made aware
team aware of plan
aware of plan
medical resident aware
team aware of plan
resident and attending made aware of above plan

## 2020-03-18 NOTE — PROGRESS NOTE BEHAVIORAL HEALTH - PERCEPTIONS
No abnormalities

## 2020-03-18 NOTE — PROGRESS NOTE BEHAVIORAL HEALTH - GROOMING
Fair
Poor
Fair
Poor
Fair

## 2020-03-18 NOTE — PROGRESS NOTE BEHAVIORAL HEALTH - AFFECT CONGRUENCE
Congruent
Other
Other

## 2020-03-18 NOTE — PROGRESS NOTE BEHAVIORAL HEALTH - SUMMARY
65 yo AAF, single, lives with a 24/7 HHA in a private home, has an adult son, with extensive medical history including taking standing corticosteroids daily, + chronic pain on opiates; BIB EMS from home  for AMS  patient with similar admission Jan 12 to [place] where she refused all treatment, assessed to not have capacity, however subsequently discharged AMA at family's (HCP)  request.   Psychiatry at  recommended urine studies which were never completed prior to discharge.   at time of [place] admission patient's TSH was 29, on this admission assessed to be 101.   upon presentation patient reported to be combative in ED (threw object) since then has been irritable obstinant with inconsistent orientation.    patient does not have capacity to refuse treatment. she presents currently as irritable and suspicious.     Per collateral from son  patient exhibited very sudden changes in personality 3 weeks ago; started cursing at her son (unprecedented), drinking 1-2 gallons of water a day, calling the police 10 times a day, ordering food from restaurant and calling the restaurant and complaining to workers there that food hadn't arrived after the food arrived, accusing son of stealing $180,000 from her closet. Furthermore, patient's strange behaviors were pronounced on some days and virtually absent on other days; typically lasted from a few hours to a few days.    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     Multiple possible causes for delirium including notable hypothyroidism, electrolyte disturbances, hx of opioid use, possible steroid psychosis, UTI  haldol 2 mg Q4hr PRN for acute agitation.  zyprexa 2.5 mg po BID for agitation, mood stability, however addressing underlying medical issues would be primarily successfull to improve her delirium.
63 yo AAF, single, lives with a 24/7 HHA in a private home, has an adult son, with extensive medical history including taking standing corticosteroids daily, + chronic pain on opiates; BIB EMS from home  for AMS  patient with similar admission Jan 12 to [place] where she refused all treatment, assessed to not have capacity, however subsequently discharged AMA at family's (HCP)  request.   Psychiatry at  recommended urine studies which were never completed prior to discharge.   at time of [facility] admission patient's TSH was 29, on this admission assessed to be 101.   upon presentation patient reported to be combative in ED (threw object) since then has been irritable obstinate with inconsistent orientation.
65 yo AAF, single, lives with a 24/7 HHA in a private home, has an adult son, with extensive medical history including taking standing corticosteroids daily, + chronic pain on opiates; BIB EMS from home  for AMS  patient with similar admission Jan 12 to [place] where she refused all treatment, assessed to not have capacity, however subsequently discharged AMA at family's (HCP)  request.   Psychiatry at  recommended urine studies which were never completed prior to discharge.   at time of [place] admission patient's TSH was 29, on this admission assessed to be 101.   upon presentation patient reported to be combative in ED (threw object) since then has been irritable obstinant with inconsistent orientation.    patient does not have capacity to refuse treatment. she presents currently as irritable and suspicious.     Per collateral from son  patient exhibited very sudden changes in personality 3 weeks ago; started cursing at her son (unprecedented), drinking 1-2 gallons of water a day, calling the police 10 times a day, ordering food from restaurant and calling the restaurant and complaining to workers there that food hadn't arrived after the food arrived, accusing son of stealing $180,000 from her closet. Furthermore, patient's strange behaviors were pronounced on some days and virtually absent on other days; typically lasted from a few hours to a few days.     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     Multiple possible causes for delirium including notable hypothyroidism, electrolyte disturbances, hx of opioid use, possible steroid psychosis, UTI  haldol 1 mg TID PRN for acute agitation. consider standing seroquel 25 mg po TID for agitation, mood stability, however addressing underlying medical issues would be primarily successfull to improve her delirium.
65 yo AAF, single, lives with a 24/7 HHA in a private home, has an adult son, with extensive medical history including taking standing corticosteroids daily, + chronic pain on opiates; BIB EMS from home  for AMS  patient with similar admission Jan 12 to [place] where she refused all treatment, assessed to not have capacity, however subsequently discharged AMA at family's (HCP)  request.   Psychiatry at  recommended urine studies which were never completed prior to discharge.   at time of [place] admission patient's TSH was 29, on this admission assessed to be 101.   upon presentation patient reported to be combative in ED (threw object) since then has been irritable obstinant with inconsistent orientation.    patient does not have capacity to refuse treatment. she presents currently as irritable and suspicious.     Per collateral from son  patient exhibited very sudden changes in personality 3 weeks ago; started cursing at her son (unprecedented), drinking 1-2 gallons of water a day, calling the police 10 times a day, ordering food from restaurant and calling the restaurant and complaining to workers there that food hadn't arrived after the food arrived, accusing son of stealing $180,000 from her closet. Furthermore, patient's strange behaviors were pronounced on some days and virtually absent on other days; typically lasted from a few hours to a few days.    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     Multiple possible causes for delirium including notable hypothyroidism, electrolyte disturbances, hx of opioid use, possible steroid psychosis, UTI  haldol 2 mg Q4hr PRN for acute agitation.  consider raising zyprexa to 5 mg po BID for agitation, mood stability, however addressing underlying medical issues would be primarily successfull to improve her delirium. f/u QTc < 500
65 yo AAF, single, lives with a 24/7 HHA in a private home, has an adult son, with extensive medical history including taking standing corticosteroids daily, + chronic pain on opiates; BIB EMS from home  for AMS  patient with similar admission Jan 12 to [facility] where she refused all treatment, assessed to not have capacity, however subsequently discharged AMA at family's (HCP)  request.   Psychiatry at  recommended urine studies which were never completed prior to discharge.   at time of [facility] admission patient's TSH was 29, on this admission assessed to be 101.   upon presentation patient reported to be combative in ED (threw object) since then has been irritable obstinate with inconsistent orientation.
63 yo AAF, single, lives with a 24/7 HHA in a private home, has an adult son, with extensive medical history including taking standing corticosteroids daily, + chronic pain on opiates; BIB EMS from home  for AMS  patient with similar admission Jan 12 to [place] where she refused all treatment, assessed to not have capacity, however subsequently discharged AMA at family's (HCP)  request.   Psychiatry at  recommended urine studies which were never completed prior to discharge.   at time of [place] admission patient's TSH was 29, on this admission assessed to be 101.   upon presentation patient reported to be combative in ED (threw object) since then has been irritable obstinate with inconsistent orientation.    In recent days, catatonia treated, on ativan 0.5 mg TID. pt has periods of irritable mood, requires re-direction, but overall presentation improved since her admission to the hospital. 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
63 yo AAF, single, lives with a 24/7 HHA in a private home, has an adult son, with extensive medical history including taking standing corticosteroids daily, + chronic pain on opiates; BIB EMS from home  for AMS  patient with similar admission Jan 12 to [place] where she refused all treatment, assessed to not have capacity, however subsequently discharged AMA at family's (HCP)  request.   Psychiatry at  recommended urine studies which were never completed prior to discharge.   at time of [facility] admission patient's TSH was 29, on this admission assessed to be 101.   upon presentation patient reported to be combative in ED (threw object) since then has been irritable obstinate with inconsistent orientation.    patient does not have capacity to refuse treatment. she presents currently as irritable and suspicious.     Per collateral from son  patient exhibited very sudden changes in personality 3 weeks ago; started cursing at her son (unprecedented), drinking 1-2 gallons of water a day, calling the police 10 times a day, ordering food from restaurant and calling the restaurant and complaining to workers there that food hadn't arrived after the food arrived, accusing son of stealing $180,000 from her closet. Furthermore, patient's strange behaviors were pronounced on some days and virtually absent on other days; typically lasted from a few hours to a few days.    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     Multiple possible causes for delirium including notable hypothyroidism, electrolyte disturbances, hx of opioid use, possible steroid psychosis, UTI  haldol 2 mg Q4hr PRN for acute agitation.  -given prolonged QTC today (500 ms), avoid neuroleptics for now  -consider depakote 250 mg daily , 500 mg qhs  for agitation, mood stability  . f/u QTc < 500ms, monitor Mg, K+
63 yo AAF, single, lives with a 24/7 HHA in a private home, has an adult son, with extensive medical history including taking standing corticosteroids daily, + chronic pain on opiates; BIB EMS from home  for AMS  patient with similar admission Jan 12 to [facility] where she refused all treatment, assessed to not have capacity, however subsequently discharged AMA at family's (HCP)  request.   Psychiatry at  recommended urine studies which were never completed prior to discharge.   at time of [facility] admission patient's TSH was 29, on this admission assessed to be 101.   upon presentation patient reported to be combative in ED (threw object) since then has been irritable obstinate with inconsistent orientation.    In recent days, catatonia treated, on ativan 0.5 mg TID. pt has periods of irritable mood, requires re-direction. 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
65 yo AAF, single, lives with a 24/7 HHA in a private home, has an adult son, with extensive medical history including taking standing corticosteroids daily, + chronic pain on opiates; BIB EMS from home  for AMS  patient with similar admission Jan 12 to [place] where she refused all treatment, assessed to not have capacity, however subsequently discharged AMA at family's (HCP)  request.   Psychiatry at  recommended urine studies which were never completed prior to discharge.   at time of [place] admission patient's TSH was 29, on this admission assessed to be 101.   upon presentation patient reported to be combative in ED (threw object) since then has been irritable obstinate with inconsistent orientation.    Pt seen today, provided ativan 1 mg IV x 1 challenge with good effect. pt more verbal, jovial, lying comfortably in bed. no si/hi. Son Norris was made aware of presentation. He gives consent to continue with ativan 1 mg TID IV for now.
65 yo AAF, single, lives with a 24/7 HHA in a private home, has an adult son, with extensive medical history including taking standing corticosteroids daily, + chronic pain on opiates; BIB EMS from home  for AMS  patient with similar admission Jan 12 to [place] where she refused all treatment, assessed to not have capacity, however subsequently discharged AMA at family's (HCP)  request.   Psychiatry at  recommended urine studies which were never completed prior to discharge.   at time of [facility] admission patient's TSH was 29, on this admission assessed to be 101.   upon presentation patient reported to be combative in ED (threw object) since then has been irritable obstinate with inconsistent orientation.    patient does not have capacity to refuse treatment. she presents currently as irritable and suspicious.     Per collateral from son  patient exhibited very sudden changes in personality 3 weeks ago; started cursing at her son (unprecedented), drinking 1-2 gallons of water a day, calling the police 10 times a day, ordering food from restaurant and calling the restaurant and complaining to workers there that food hadn't arrived after the food arrived, accusing son of stealing $180,000 from her closet. Furthermore, patient's strange behaviors were pronounced on some days and virtually absent on other days; typically lasted from a few hours to a few days.    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     Multiple possible causes for delirium including notable hypothyroidism, electrolyte disturbances, hx of opioid use, possible steroid psychosis, UTI    AT this time psychiatric admission not recommended, patient is irritable , with impulsive behavior, suspicious and illogical and lacks capacity but shows no evidence of displaying imminent dangerousness.    Her severe physical debilitation at this time  also is prohibitive of her dangerousness to self or others.      Since admission patient has consistently displayed inconsistent compliance with medication.  haldol 2 mg Q4hr PRN for acute agitation.  -given prolonged QTC today (500 ms), avoid neuroleptics for now  -f/u neuro recs for rule out transverse venous thrombosis
63 yo AAF, single, lives with a 24/7 HHA in a private home, has an adult son, with extensive medical history including taking standing corticosteroids daily, + chronic pain on opiates; BIB EMS from home  for AMS  patient with similar admission Jan 12 to [place] where she refused all treatment, assessed to not have capacity, however subsequently discharged AMA at family's (HCP)  request.   Psychiatry at  recommended urine studies which were never completed prior to discharge.   at time of [place] admission patient's TSH was 29, on this admission assessed to be 101.   upon presentation patient reported to be combative in ED (threw object) since then has been irritable obstinate with inconsistent orientation.    In recent days, catatonia treated, on ativan 0.5 mg TID. pt has periods of irritable mood, requires re-direction, but overall presentation improved since her admission to the hospital. 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
65 yo AAF, single, lives with a 24/7 HHA in a private home, has an adult son, with extensive medical history including taking standing corticosteroids daily, + chronic pain on opiates; BIB EMS from home  for AMS  patient with similar admission Jan 12 to [place] where she refused all treatment, assessed to not have capacity, however subsequently discharged AMA at family's (HCP)  request.   Psychiatry at  recommended urine studies which were never completed prior to discharge.   at time of [place] admission patient's TSH was 29, on this admission assessed to be 101.   upon presentation patient reported to be combative in ED (threw object) since then has been irritable obstinant with inconsistent orientation.    patient does not have capacity to refuse treatment. she presents currently as irritable and suspicious.     Per collateral from son  patient exhibited very sudden changes in personality 3 weeks ago; started cursing at her son (unprecedented), drinking 1-2 gallons of water a day, calling the police 10 times a day, ordering food from restaurant and calling the restaurant and complaining to workers there that food hadn't arrived after the food arrived, accusing son of stealing $180,000 from her closet. Furthermore, patient's strange behaviors were pronounced on some days and virtually absent on other days; typically lasted from a few hours to a few days.    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     Multiple possible causes for delirium including notable hypothyroidism, electrolyte disturbances, hx of opioid use, possible steroid psychosis, UTI  haldol 2 mg Q4hr PRN for acute agitation. consider d/c seroquel and replace with zyprexa 2.5 mg po BID for agitation, mood stability, however addressing underlying medical issues would be primarily successfull to improve her delirium.
65 yo AAF, single, lives with a 24/7 HHA in a private home, has an adult son, with extensive medical history including taking standing corticosteroids daily, + chronic pain on opiates; BIB EMS from home  for AMS  patient with similar admission Jan 12 to [place] where she refused all treatment, assessed to not have capacity, however subsequently discharged AMA at family's (HCP)  request.   Psychiatry at  recommended urine studies which were never completed prior to discharge.   at time of [place] admission patient's TSH was 29, on this admission assessed to be 101.   upon presentation patient reported to be combative in ED (threw object) since then has been irritable obstinate with inconsistent orientation.    patient does not have capacity to refuse treatment. she presents currently as irritable and suspicious.     Per collateral from son  patient exhibited very sudden changes in personality 3 weeks ago; started cursing at her son (unprecedented), drinking 1-2 gallons of water a day, calling the police 10 times a day, ordering food from restaurant and calling the restaurant and complaining to workers there that food hadn't arrived after the food arrived, accusing son of stealing $180,000 from her closet. Furthermore, patient's strange behaviors were pronounced on some days and virtually absent on other days; typically lasted from a few hours to a few days.    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     Multiple possible causes for delirium including notable hypothyroidism, electrolyte disturbances, hx of opioid use, possible steroid psychosis, UTI    AT this time psychiatric admission not recommended, patient is irritable , with impulsive behavior, suspicious and illogical and lacks capacity but shows no evidence of displaying imminent dangerousness.    Her severe physical debilitation at this time  also is prohibitive of her dangerousness to self or others.      Since admission patient has consistently displayed inconsistent compliance with medication.  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
65 yo AAF, single, lives with a 24/7 HHA in a private home, has an adult son, with extensive medical history including taking standing corticosteroids daily, + chronic pain on opiates; BIB EMS from home  for AMS  patient with similar admission Jan 12 to [place] where she refused all treatment, assessed to not have capacity, however subsequently discharged AMA at family's (HCP)  request.   Psychiatry at  recommended urine studies which were never completed prior to discharge.   at time of [place] admission patient's TSH was 29, on this admission assessed to be 101.   upon presentation patient reported to be combative in ED (threw object) since then has been irritable obstinate with inconsistent orientation.    patient does not have capacity to refuse treatment. she presents currently as irritable and suspicious.     Per collateral from son  patient exhibited very sudden changes in personality 3 weeks ago; started cursing at her son (unprecedented), drinking 1-2 gallons of water a day, calling the police 10 times a day, ordering food from restaurant and calling the restaurant and complaining to workers there that food hadn't arrived after the food arrived, accusing son of stealing $180,000 from her closet. Furthermore, patient's strange behaviors were pronounced on some days and virtually absent on other days; typically lasted from a few hours to a few days.    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     Multiple possible causes for delirium including notable hypothyroidism, electrolyte disturbances, hx of opioid use, possible steroid psychosis, UTI  haldol 2 mg Q4hr PRN for acute agitation.  -given prolonged QTC today (500 ms)  discontinue olanzapine for now  -may start depakote 250 mg daily , 500 mg qhs  for agitation, mood stability  . f/u QTc < 500ms, monitor Mg, K+
65 yo AAF, single, lives with a 24/7 HHA in a private home, has an adult son, with extensive medical history including taking standing corticosteroids daily, + chronic pain on opiates; BIB EMS from home  for AMS  patient with similar admission Jan 12 to [place] where she refused all treatment, assessed to not have capacity, however subsequently discharged AMA at family's (HCP)  request.   Psychiatry at  recommended urine studies which were never completed prior to discharge.   at time of [place] admission patient's TSH was 29, on this admission assessed to be 101.   upon presentation patient reported to be combative in ED (threw object) since then has been irritable obstinate with inconsistent orientation.    Pt seen today, provided ativan 1 mg IV x 1 challenge with good effect. pt more verbal, jovial, lying comfortably in bed. no si/hi. Son Norris was made aware of presentation. He gives consent to continue with ativan 1 mg TID IV for now.
65 yo AAF, single, lives with a 24/7 HHA in a private home, has an adult son, with extensive medical history including taking standing corticosteroids daily, + chronic pain on opiates; BIB EMS from home  for AMS  patient with similar admission Jan 12 to [place] where she refused all treatment, assessed to not have capacity, however subsequently discharged AMA at family's (HCP)  request.   Psychiatry at  recommended urine studies which were never completed prior to discharge.   at time of [place] admission patient's TSH was 29, on this admission assessed to be 101.   upon presentation patient reported to be combative in ED (threw object) since then has been irritable obstinate with inconsistent orientation.    patient does not have capacity to refuse treatment. she presents currently as irritable and suspicious.     Per collateral from son  patient exhibited very sudden changes in personality 3 weeks ago; started cursing at her son (unprecedented), drinking 1-2 gallons of water a day, calling the police 10 times a day, ordering food from restaurant and calling the restaurant and complaining to workers there that food hadn't arrived after the food arrived, accusing son of stealing $180,000 from her closet. Furthermore, patient's strange behaviors were pronounced on some days and virtually absent on other days; typically lasted from a few hours to a few days.    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     Multiple possible causes for delirium including notable hypothyroidism, electrolyte disturbances, hx of opioid use, possible steroid psychosis, UTI    AT this time psychiatric admission not recommended, patient is irritable , with impulsive behavior, suspicious and illogical and lacks capacity but shows no evidence of displaying imminent dangerousness.    Her severe physical debilitation at this time  also is prohibitive of her dangerousness to self or others.      Since admission patient has consistently displayed inconsistent compliance with medication.  haldol 2 mg Q4hr PRN for acute agitation.  -given prolonged QTC today (500 ms), avoid neuroleptics for now  -depakote 250 mg daily , 500 mg qhs  for agitation, mood stability may be given either as pill or sprinkles  . f/u QTc < 500ms, monitor Mg, K+  -consider EEG
65 yo AAF, single, lives with a 24/7 HHA in a private home, has an adult son, with extensive medical history including taking standing corticosteroids daily, + chronic pain on opiates; BIB EMS from home  for AMS  patient with similar admission Jan 12 to [place] where she refused all treatment, assessed to not have capacity, however subsequently discharged AMA at family's (HCP)  request.   Psychiatry at  recommended urine studies which were never completed prior to discharge.   at time of [facility] admission patient's TSH was 29, on this admission assessed to be 101.   upon presentation patient reported to be combative in ED (threw object) since then has been irritable obstinate with inconsistent orientation.    Pt appears to be tolerating zyprexa 2.5 mg po BID for mood stability, no si/hi, more calm today, however does have periods of mood lability symptoms.
63 yo AAF, single, lives with a 24/7 HHA in a private home, has an adult son, with extensive medical history including taking standing corticosteroids daily, + chronic pain on opiates; BIB EMS from home  for AMS  patient with similar admission Jan 12 to [place] where she refused all treatment, assessed to not have capacity, however subsequently discharged AMA at family's (HCP)  request.   Psychiatry at  recommended urine studies which were never completed prior to discharge.   at time of [place] admission patient's TSH was 29, on this admission assessed to be 101.   upon presentation patient reported to be combative in ED (threw object) since then has been irritable obstinate with inconsistent orientation.
63 yo AAF, single, lives with a 24/7 HHA in a private home, has an adult son, with extensive medical history including taking standing corticosteroids daily, + chronic pain on opiates; BIB EMS from home  for AMS  patient with similar admission Jan 12 to [place] where she refused all treatment, assessed to not have capacity, however subsequently discharged AMA at family's (HCP)  request.   Psychiatry at  recommended urine studies which were never completed prior to discharge.   at time of [facility] admission patient's TSH was 29, on this admission assessed to be 101.   upon presentation patient reported to be combative in ED (threw object) since then has been irritable obstinate with inconsistent orientation.    patient does not have capacity to refuse treatment. she presents currently as irritable and suspicious.     Per collateral from son  patient exhibited very sudden changes in personality 3 weeks ago; started cursing at her son (unprecedented), drinking 1-2 gallons of water a day, calling the police 10 times a day, ordering food from restaurant and calling the restaurant and complaining to workers there that food hadn't arrived after the food arrived, accusing son of stealing $180,000 from her closet. Furthermore, patient's strange behaviors were pronounced on some days and virtually absent on other days; typically lasted from a few hours to a few days.    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     Multiple possible causes for delirium including notable hypothyroidism, electrolyte disturbances, hx of opioid use, possible steroid psychosis, UTI    AT this time psychiatric admission not recommended, patient is irritable , with impulsive behavior, suspicious and illogical and lacks capacity but shows no evidence of displaying imminent dangerousness.    Her severe physical debilitation at this time  also is prohibitive of her dangerousness to self or others.      Since admission patient has consistently displayed inconsistent compliance with medication.  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
63 yo AAF, single, lives with a 24/7 HHA in a private home, has an adult son, with extensive medical history including taking standing corticosteroids daily, + chronic pain on opiates; BIB EMS from home  for AMS  patient with similar admission Jan 12 to [facility] where she refused all treatment, assessed to not have capacity, however subsequently discharged AMA at family's (HCP)  request.   Psychiatry at  recommended urine studies which were never completed prior to discharge.   at time of [facility] admission patient's TSH was 29, on this admission assessed to be 101.   upon presentation patient reported to be combative in ED (threw object) since then has been irritable obstinant with inconsistent orientation.    patient does not have capacity to refuse treatment. she presents currently as irritable and suspicious.     Per collateral from son  patient exhibited very sudden changes in personality 3 weeks ago; started cursing at her son (unprecedented), drinking 1-2 gallons of water a day, calling the police 10 times a day, ordering food from restaurant and calling the restaurant and complaining to workers there that food hadn't arrived after the food arrived, accusing son of stealing $180,000 from her closet. Furthermore, patient's strange behaviors were pronounced on some days and virtually absent on other days; typically lasted from a few hours to a few days.     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     Multiple possible causes for delirium including notable hypothyroidism, electrolyte disturbances, hx of opioid use, possible steroid psychosis, UTI  haldol 2.5 mG BID recommended-although patient refuses. given patient's complete lack of capacity consider starting standing haldol 2.5 mg  IV BID with HCP's approval to attempt to improve patient's mental status,however addressing underlying medical issues would be primarily successfull to improve her delirium.
63 yo AAF, single, lives with a 24/7 HHA in a private home, has an adult son, with extensive medical history including taking standing corticosteroids daily, + chronic pain on opiates; BIB EMS from home  for AMS  patient with similar admission Jan 12 to [facility] where she refused all treatment, assessed to not have capacity, however subsequently discharged AMA at family's (HCP)  request.   Psychiatry at  recommended urine studies which were never completed prior to discharge.   at time of [facility] admission patient's TSH was 29, on this admission assessed to be 101.   upon presentation patient reported to be combative in ED (threw object) since then has been irritable obstinate with inconsistent orientation.    In recent days, pt has been intermittently refusing medications, though received Abilify 5mg yesterday. Plan is to discuss increasing Abilify with son Norris, with alternative of IV haldol with goal of restarting Abilify when pt becomes more compliant with medications. Left voicemail for Norris to call back. Pt's mental status appears to fluctuate, consistent with delirium. Pt has cognitive deficits, likely vascular related.
63 yo AAF, single, lives with a 24/7 HHA in a private home, has an adult son, with extensive medical history including taking standing corticosteroids daily, + chronic pain on opiates; BIB EMS from home  for AMS  patient with similar admission Jan 12 to [place] where she refused all treatment, assessed to not have capacity, however subsequently discharged AMA at family's (HCP)  request.   Psychiatry at  recommended urine studies which were never completed prior to discharge.   at time of [place] admission patient's TSH was 29, on this admission assessed to be 101.   upon presentation patient reported to be combative in ED (threw object) since then has been irritable obstinate with inconsistent orientation.    In recent days, catatonia treated, on ativan 0.5 mg TID though intermittently refusing. Pt has periods of irritable mood, requires re-direction. Pt refused Abilify yesterday. Pt's mental status appears to fluctuate, consistent with delirium. Pt has cognitive deficits, likely vascular related.

## 2020-03-18 NOTE — PROGRESS NOTE BEHAVIORAL HEALTH - NSBHCONSORIP_PSY_A_CORE
Consult...

## 2020-03-18 NOTE — PROGRESS NOTE BEHAVIORAL HEALTH - IMPULSE CONTROL
Impaired
Normal
Impaired
Normal
Normal
Impaired
Normal
Normal
Impaired

## 2020-03-18 NOTE — PROGRESS NOTE BEHAVIORAL HEALTH - NSBHPTASSESSDT_PSY_A_CORE
02-Mar-2020 10:04
04-Mar-2020 17:17
05-Feb-2020 10:27
07-Feb-2020 11:42
07-Mar-2020 10:47
08-Mar-2020 12:33
09-Mar-2020 10:09
09-Mar-2020 10:09
10-Feb-2020 11:10
11-Feb-2020 10:05
12-Feb-2020 12:49
16-Mar-2020 15:14
18-Mar-2020 11:52
26-Feb-2020 17:11
26-Jan-2020 11:58
27-Feb-2020 11:18
27-Jan-2020 12:20
28-Feb-2020 12:03
29-Jan-2020 10:26
31-Jan-2020 09:41

## 2020-03-18 NOTE — PROGRESS NOTE BEHAVIORAL HEALTH - MOOD
Irritable
Normal
Irritable
Normal
Other

## 2020-03-18 NOTE — PROGRESS NOTE BEHAVIORAL HEALTH - NSBHCHARTREVIEWVS_PSY_A_CORE FT
Vital Signs Last 24 Hrs  T(C): --  T(F): --  HR: --  BP: --  BP(mean): --  RR: --  SpO2: --
T(C): --  HR: 82 (02-05-20 @ 05:32) (80 - 109)  BP: 158/94 (02-05-20 @ 05:14) (146/87 - 158/94)  RR: 18 (02-05-20 @ 05:14) (16 - 18)  SpO2: 98% (02-05-20 @ 05:32) (96% - 100%)  Wt(kg): --
T(C): 36.5 (03-09-20 @ 06:46), Max: 37.4 (03-08-20 @ 21:23)  HR: 100 (03-09-20 @ 06:46) (100 - 103)  BP: 148/86 (03-09-20 @ 06:46) (132/89 - 151/78)  RR: 18 (03-09-20 @ 06:46) (18 - 18)  SpO2: 94% (03-09-20 @ 06:46) (94% - 98%)  Wt(kg): --
T(C): 36.6 (03-08-20 @ 04:25), Max: 36.6 (03-07-20 @ 17:29)  HR: 98 (03-08-20 @ 04:25) (91 - 101)  BP: 127/85 (03-08-20 @ 04:25) (113/71 - 127/85)  RR: 18 (03-08-20 @ 04:25) (18 - 18)  SpO2: 98% (03-08-20 @ 04:25) (96% - 99%)  Wt(kg): --
T(C): 36.8 (02-10-20 @ 09:00), Max: 36.8 (02-10-20 @ 09:00)  HR: 86 (02-10-20 @ 09:00) (71 - 104)  BP: 120/89 (02-10-20 @ 09:00) (118/79 - 132/76)  RR: 18 (02-10-20 @ 09:00) (18 - 18)  SpO2: 94% (02-10-20 @ 09:00) (94% - 98%)  Wt(kg): --
T(C): 36.9 (01-29-20 @ 05:17), Max: 36.9 (01-29-20 @ 05:17)  HR: 88 (01-29-20 @ 05:40) (88 - 111)  BP: 132/74 (01-29-20 @ 05:17) (132/74 - 152/77)  RR: 18 (01-29-20 @ 05:17) (18 - 99)  SpO2: 99% (01-29-20 @ 05:40) (98% - 100%)  Wt(kg): --
T(C): 36.9 (03-04-20 @ 13:43), Max: 36.9 (03-04-20 @ 13:43)  HR: 101 (03-04-20 @ 13:43) (101 - 110)  BP: 128/65 (03-04-20 @ 13:43) (125/84 - 144/79)  RR: 18 (03-04-20 @ 13:43) (18 - 20)  SpO2: 96% (03-04-20 @ 13:43) (95% - 98%)  Wt(kg): --
T(C): 36.9 (03-07-20 @ 08:35), Max: 36.9 (03-06-20 @ 22:07)  HR: 98 (03-07-20 @ 08:35) (77 - 119)  BP: 131/78 (03-07-20 @ 08:35) (131/78 - 139/82)  RR: 18 (03-07-20 @ 08:35) (18 - 18)  SpO2: 97% (03-07-20 @ 08:35) (96% - 98%)  Wt(kg): --
T(C): 36.9 (03-12-20 @ 05:43), Max: 36.9 (03-11-20 @ 14:37)  HR: 92 (03-12-20 @ 05:45) (77 - 98)  BP: 139/80 (03-12-20 @ 05:43) (125/72 - 175/84)  RR: 18 (03-12-20 @ 05:43) (18 - 20)  SpO2: 98% (03-12-20 @ 05:45) (96% - 99%)  Wt(kg): --
T(C): 37 (03-02-20 @ 04:22), Max: 37.2 (03-01-20 @ 11:33)  HR: 87 (03-02-20 @ 05:31) (87 - 111)  BP: 131/92 (03-02-20 @ 04:22) (118/77 - 138/88)  RR: 20 (03-02-20 @ 04:22) (18 - 20)  SpO2: 98% (03-02-20 @ 05:31) (94% - 98%)  Wt(kg): --
T(C): 37 (03-16-20 @ 14:12), Max: 37.3 (03-15-20 @ 22:45)  HR: 87 (03-16-20 @ 06:17) (83 - 90)  BP: 121/75 (03-16-20 @ 06:17) (111/69 - 121/75)  RR: 16 (03-16-20 @ 06:17) (16 - 16)  SpO2: 95% (03-16-20 @ 06:17) (95% - 96%)  Wt(kg): --
T(C): 37 (03-17-20 @ 22:18), Max: 37 (03-17-20 @ 22:18)  HR: 87 (03-18-20 @ 05:44) (87 - 94)  BP: 102/65 (03-17-20 @ 22:18) (102/65 - 118/69)  RR: 16 (03-17-20 @ 22:18) (16 - 16)  SpO2: 99% (03-18-20 @ 05:44) (96% - 99%)  Wt(kg): --
T(C): 37.1 (02-26-20 @ 13:49), Max: 37.1 (02-25-20 @ 21:30)  HR: 104 (02-26-20 @ 13:49) (95 - 104)  BP: 151/93 (02-26-20 @ 13:49) (151/83 - 151/93)  RR: 20 (02-26-20 @ 13:49) (20 - 20)  SpO2: 97% (02-26-20 @ 13:49) (88% - 97%)  Wt(kg): --
T(C): 37.2 (02-12-20 @ 04:23), Max: 37.6 (02-11-20 @ 13:36)  HR: 100 (02-12-20 @ 04:23) (78 - 108)  BP: 186/85 (02-12-20 @ 04:23) (112/77 - 186/85)  RR: 17 (02-12-20 @ 04:23) (17 - 18)  SpO2: 100% (02-12-20 @ 04:23) (93% - 100%)  Wt(kg): --
Vital Signs Last 24 Hrs  T(C): --  T(F): --  HR: 77 (11 Feb 2020 05:53) (76 - 80)  BP: 116/84 (10 Feb 2020 19:14) (116/84 - 116/84)  BP(mean): --  RR: 18 (10 Feb 2020 19:14) (18 - 18)  SpO2: 98% (11 Feb 2020 05:53) (98% - 98%)
Vital Signs Last 24 Hrs  T(C): 36.8 (27 Feb 2020 20:49), Max: 36.9 (27 Feb 2020 17:59)  T(F): 98.3 (27 Feb 2020 20:49), Max: 98.5 (27 Feb 2020 17:59)  HR: 94 (27 Feb 2020 20:49) (94 - 109)  BP: 131/91 (27 Feb 2020 20:49) (131/91 - 139/91)  BP(mean): --  RR: 17 (27 Feb 2020 20:49) (16 - 17)  SpO2: 95% (27 Feb 2020 20:49) (94% - 95%)
Vital Signs Last 24 Hrs  T(C): 36.9 (07 Feb 2020 06:20), Max: 36.9 (06 Feb 2020 21:29)  T(F): 98.5 (07 Feb 2020 06:20), Max: 98.5 (07 Feb 2020 06:20)  HR: 105 (07 Feb 2020 06:20) (80 - 110)  BP: 156/87 (07 Feb 2020 06:20) (98/66 - 188/96)  BP(mean): --  RR: 20 (07 Feb 2020 06:20) (13 - 20)  SpO2: 99% (07 Feb 2020 06:20) (93% - 99%)
Vital Signs Last 24 Hrs  T(C): 37.1 (13 Feb 2020 11:58), Max: 37.1 (13 Feb 2020 05:41)  T(F): 98.8 (13 Feb 2020 11:58), Max: 98.8 (13 Feb 2020 11:58)  HR: 92 (13 Feb 2020 12:32) (89 - 102)  BP: 138/87 (13 Feb 2020 12:32) (119/62 - 160/90)  BP(mean): --  RR: 20 (13 Feb 2020 11:58) (18 - 20)  SpO2: 100% (13 Feb 2020 11:58) (97% - 100%)
Vital Signs Last 24 Hrs  T(C): 37.1 (27 Feb 2020 04:22), Max: 37.1 (26 Feb 2020 13:49)  T(F): 98.8 (27 Feb 2020 04:22), Max: 98.8 (26 Feb 2020 22:33)  HR: 105 (27 Feb 2020 06:23) (103 - 106)  BP: 139/82 (27 Feb 2020 06:23) (138/81 - 162/98)  BP(mean): --  RR: 18 (27 Feb 2020 04:22) (18 - 20)  SpO2: 92% (27 Feb 2020 04:22) (92% - 97%)
Vital Signs Last 24 Hrs  T(C): 37.9 (14 Feb 2020 12:55), Max: 37.9 (14 Feb 2020 12:55)  T(F): 100.3 (14 Feb 2020 12:55), Max: 100.3 (14 Feb 2020 12:55)  HR: 111 (14 Feb 2020 12:55) (82 - 111)  BP: 159/82 (14 Feb 2020 12:55) (117/44 - 159/82)  BP(mean): --  RR: 20 (14 Feb 2020 12:55) (20 - 20)  SpO2: 98% (14 Feb 2020 12:55) (91% - 99%)

## 2020-03-18 NOTE — PROGRESS NOTE BEHAVIORAL HEALTH - THOUGHT CONTENT
Other
Delusions
Obsessions/Preoccupations
Delusions
Obsessions/Preoccupations
Other
Unremarkable
Unremarkable
Delusions
Unremarkable
Obsessions/Preoccupations
Other
Unremarkable
Obsessions/Preoccupations
Obsessions/Preoccupations
Other
Unremarkable
Other
Unremarkable
Other
Delusions
Delusions

## 2020-03-18 NOTE — PROGRESS NOTE BEHAVIORAL HEALTH - NSBHCONSULTMEDPRNREASON_PSY_A_CORE
severe agitation...
agitation...
severe agitation...
agitation...
severe agitation...
severe agitation...
agitation...
severe agitation...

## 2020-03-18 NOTE — PROGRESS NOTE BEHAVIORAL HEALTH - PRIMARY DX
Delirium due to another medical condition
Delirium due to multiple etiologies, acute, hyperactive
Delirium due to another medical condition
Delirium due to multiple etiologies, acute, hyperactive
Delirium due to another medical condition
Delirium due to multiple etiologies, acute, hyperactive
Delirium due to multiple etiologies, acute, hyperactive

## 2020-03-18 NOTE — PROGRESS NOTE BEHAVIORAL HEALTH - NSBHATTESTSEENBY_PSY_A_CORE
attending Psychiatrist without NP/Trainee
Attending Psychiatrist supervising NP/Trainee, meeting pt...
Attending Psychiatrist supervising NP/Trainee, meeting pt...

## 2020-03-18 NOTE — PROGRESS NOTE BEHAVIORAL HEALTH - NSBHCHARTREVIEWLAB_PSY_A_CORE FT
Hemoglobin A1C, Whole Blood (01.28.20 @ 19:30)    Hemoglobin A1C, Whole Blood: 9.0: Method: Immunoassay       Reference Range                4.0-5.6%       High risk (prediabetic)        5.7-6.4%       Diabetic, diagnostic             >=6.5%       ADA diabetic treatment goal       <7.0%  The Hemoglobin A1c testing is NGSP-certified.Reference ranges are based  upon the 2010 recommendations of  the American Diabetes Association.  Interpretation may vary for children  and adolescents. %
10.0   9.49  )-----------( 354      ( 25 Jan 2020 00:57 )             31.8     01-25    137  |  101  |  15  ----------------------------<  213<H>  3.4<L>   |  19<L>  |  0.84    Ca    8.6      25 Jan 2020 08:42  Phos  2.2     01-25  Mg     2.0     01-25    TPro  6.6  /  Alb  3.5  /  TBili  0.1<L>  /  DBili  x   /  AST  14  /  ALT  20  /  AlkPhos  92  01-25    LIVER FUNCTIONS - ( 25 Jan 2020 00:57 )  Alb: 3.5 g/dL / Pro: 6.6 g/dL / ALK PHOS: 92 U/L / ALT: 20 U/L / AST: 14 U/L / GGT: x           PT/INR - ( 25 Jan 2020 00:57 )   PT: 11.1 sec;   INR: 0.97 ratio         PTT - ( 25 Jan 2020 00:57 )  PTT:25.5 sec
03-08    138  |  106  |  7   ----------------------------<  206<H>  4.3   |  15<L>  |  0.78    Ca    8.6      08 Mar 2020 18:43  Phos  2.2     03-08  Mg     1.9     03-08    TPro  8.2  /  Alb  4.2  /  TBili  0.2  /  DBili  x   /  AST  7<L>  /  ALT  19  /  AlkPhos  101  03-07
10.1   10.06 )-----------( 388      ( 13 Feb 2020 10:53 )             33.3     02-13    140  |  102  |  16  ----------------------------<  180<H>  4.7   |  20<L>  |  0.85    Ca    9.9      13 Feb 2020 10:53  Phos  3.1     02-13  Mg     2.4     02-13    TPro  8.0  /  Alb  4.1  /  TBili  0.1<L>  /  DBili  x   /  AST  17  /  ALT  18  /  AlkPhos  83  02-13    LIVER FUNCTIONS - ( 13 Feb 2020 10:53 )  Alb: 4.1 g/dL / Pro: 8.0 g/dL / ALK PHOS: 83 U/L / ALT: 18 U/L / AST: 17 U/L / GGT: x
10.1   10.06 )-----------( 388      ( 13 Feb 2020 10:53 )             33.3     02-13    140  |  102  |  16  ----------------------------<  180<H>  4.7   |  20<L>  |  0.85    Ca    9.9      13 Feb 2020 10:53  Phos  3.1     02-13  Mg     2.4     02-13    TPro  8.0  /  Alb  4.1  /  TBili  0.1<L>  /  DBili  x   /  AST  17  /  ALT  18  /  AlkPhos  83  02-13    LIVER FUNCTIONS - ( 13 Feb 2020 10:53 )  Alb: 4.1 g/dL / Pro: 8.0 g/dL / ALK PHOS: 83 U/L / ALT: 18 U/L / AST: 17 U/L / GGT: x           PT/INR - ( 12 Feb 2020 05:30 )   PT: 11.3 sec;   INR: 0.99 ratio         PTT - ( 12 Feb 2020 05:30 )  PTT:18.8 sec
9.3    11.15 )-----------( 327      ( 27 Jan 2020 15:09 )             30.0   01-28    140  |  108  |  10  ----------------------------<  149<H>  4.1   |  19<L>  |  0.80    Ca    9.3      28 Jan 2020 15:30  Phos  2.1     01-28  Mg     2.2     01-28    TPro  7.0  /  Alb  4.0  /  TBili  0.1<L>  /  DBili  x   /  AST  15  /  ALT  18  /  AlkPhos  89  01-27
9.4    13.78 )-----------( 473      ( 05 Feb 2020 16:47 )             30.1     02-05    141  |  102  |  27<H>  ----------------------------<  260<H>  5.2   |  23  |  0.98    Ca    10.0      05 Feb 2020 16:47    TPro  7.3  /  Alb  3.9  /  TBili  <0.1<L>  /  DBili  x   /  AST  21  /  ALT  16  /  AlkPhos  101  02-05    LIVER FUNCTIONS - ( 05 Feb 2020 16:47 )  Alb: 3.9 g/dL / Pro: 7.3 g/dL / ALK PHOS: 101 U/L / ALT: 16 U/L / AST: 21 U/L / GGT: x           PT/INR - ( 05 Feb 2020 16:47 )   PT: 10.4 sec;   INR: 0.91 ratio         PTT - ( 05 Feb 2020 16:47 )  PTT:29.6 sec
9.4    9.65  )-----------( 388      ( 01 Mar 2020 06:41 )             31.8   03-01    140  |  103  |  24<H>  ----------------------------<  132<H>  4.4   |  21<L>  |  0.99    Ca    9.3      01 Mar 2020 06:41  Phos  2.8     03-01  Mg     2.0     03-01
9.4    9.85  )-----------( 393      ( 18 Mar 2020 08:11 )             32.6   03-18    141  |  106  |  25<H>  ----------------------------<  176<H>  4.2   |  20<L>  |  1.00    Ca    9.6      18 Mar 2020 08:11  Phos  3.6     03-18  Mg     1.9     03-18    TPro  6.9  /  Alb  3.6  /  TBili  0.1<L>  /  DBili  x   /  AST  22  /  ALT  23  /  AlkPhos  107  03-18
9.7                          9.3    10.48 )-----------( 346      ( 27 Feb 2020 07:12 )             32.8     02-27    142  |  100  |  16  ----------------------------<  113<H>  3.7   |  26  |  0.83    Ca    9.2      27 Feb 2020 07:12  Phos  3.0     02-27  Mg     1.9     02-27
9.7    11.54 )-----------( 385      ( 28 Feb 2020 07:27 )             33.6     02-28    141  |  101  |  21  ----------------------------<  118<H>  4.2   |  22  |  0.94    Ca    9.4      28 Feb 2020 07:26  Phos  3.0     02-28  Mg     2.1     02-28
9.7    13.14 )-----------( 409      ( 26 Feb 2020 06:46 )             31.4   02-26    143  |  103  |  17  ----------------------------<  167<H>  4.0   |  26  |  0.76    Ca    9.7      26 Feb 2020 06:46  Phos  2.6     02-26  Mg     2.0     02-26    TPro  6.6  /  Alb  3.7  /  TBili  0.1<L>  /  DBili  x   /  AST  12  /  ALT  11  /  AlkPhos  72  02-25
9.7    9.86  )-----------( 408      ( 11 Mar 2020 10:15 )             31.2     03-11    140  |  107  |  10  ----------------------------<  196<H>  4.6   |  18<L>  |  0.83    Ca    9.9      11 Mar 2020 17:31    TPro  7.2  /  Alb  3.6  /  TBili  0.1<L>  /  DBili  x   /  AST  15  /  ALT  18  /  AlkPhos  84  03-11
9.8    9.87  )-----------( 347      ( 07 Mar 2020 07:10 )             34.2   03-08    136  |  102  |  10  ----------------------------<  231<H>  4.6   |  13<L>  |  0.94    Ca    9.1      08 Mar 2020 01:50  Phos  2.6     03-08  Mg     1.9     03-08    TPro  8.2  /  Alb  4.2  /  TBili  0.2  /  DBili  x   /  AST  7<L>  /  ALT  19  /  AlkPhos  101  03-07
9.9    10.42 )-----------( 385      ( 03 Mar 2020 10:46 )             33.2   03-03    136  |  102  |  20  ----------------------------<  167<H>  4.3   |  17<L>  |  0.99    Ca    9.1      03 Mar 2020 10:46    TPro  6.9  /  Alb  3.5  /  TBili  0.1<L>  /  DBili  x   /  AST  22  /  ALT  29  /  AlkPhos  90  03-03
LFTs: wnl
LFTs: wnl

## 2020-03-18 NOTE — PROGRESS NOTE BEHAVIORAL HEALTH - EYE CONTACT
Fair
Good
Fair
Good
Good
Fair
Good
Good
Fair

## 2020-03-18 NOTE — PROGRESS NOTE BEHAVIORAL HEALTH - NS ED BHA MSE SPEECH VOLUME
Loud
Normal
Other
Loud
Normal
Soft
Loud
Normal
Normal
Other
Soft
Normal
Loud
Loud

## 2020-03-18 NOTE — PROGRESS NOTE BEHAVIORAL HEALTH - NSBHCONSFOLLOWNEEDS_PSY_A_CORE
no psychiatric contraindications to discharge
Patient needs further psychiatric safety assessment prior to discharge

## 2020-03-18 NOTE — PROGRESS NOTE BEHAVIORAL HEALTH - NSBHFUPVIOL_PSY_A_CORE
none known

## 2020-03-18 NOTE — PROGRESS NOTE BEHAVIORAL HEALTH - NS ED [facility] MSE GENERAL APPEARANCE
Well developed

## 2020-03-18 NOTE — PROGRESS NOTE BEHAVIORAL HEALTH - THOUGHT ASSOCIATIONS
Loose
Normal
Loose
Other
Loose

## 2020-03-18 NOTE — PROGRESS NOTE BEHAVIORAL HEALTH - NS ED [facility] MED ROS RESPIRATORY
Unable to assess
No complaints
Unable to assess
No complaints

## 2020-03-18 NOTE — PROGRESS NOTE BEHAVIORAL HEALTH - FUND OF KNOWLEDGE
Impaired
Normal
Impaired
Normal
Impaired
Normal
Impaired
Normal
Impaired
Normal
Normal
Impaired
Normal

## 2020-03-18 NOTE — PROGRESS NOTE ADULT - ATTENDING COMMENTS
Patient seen and examined.    64F with a history of hypothyroidism, pHTN, IDDM, COPD/CHRIS, RA (on Prednisone), SBO s/p ex lap with lysis of adhesions c/b evisceration presented with encephalopathy and agitation found to be significantly hypothyroid (, without myxedema coma), small (stable) right sided SDH, UTI with hospital course complicated by adverse catatonic reaction to VPA (now resolved) and DKA (resolved).     1. Psychiatric disease: She remains combative, and uncooperative, and delusional. Appreciate psychiatry guidance in terms of medication management. She is now on Zyprexa. And optimized from their perspective, no contraindication to discharge.   Patient does not have capacity to refuse essential medications: If patient refusing synthroid or prednisone PO, intravenous formulations should be given. Insulin should be given prior to meals.    2. Encephalopathy: Resolved, to new baseline per psychiatry. Likely multifactorial from hypothyroid, SDH, UTI, DKA. Now medically stable and treated. Per psych, multiple insults may have set a new baseline MS.    3. Hypothyroid, TSH >100 on presentation. No evidence of myxedema coma. Endocrine following. Will re-assess Free T4/ T3 on 3/20. Will confirm with them discharge plan and follow up    4. Diabetes: Continues to refuse insulin, medications. Endocrine assistance appreciated. If patient refusing insulin, she is not to be fed.     Discharge planning: Underway. Per psychiatry, no contraindication to discharge. We will have PT see today for final recommendations: Home verse [facility]. She has 24 hour care at home. If she is cleared by endocrine and pending PT recs, will plan for dispo.   She is otherwise medically ready.  Norris agreeable to take her home when ready

## 2020-03-18 NOTE — PROGRESS NOTE BEHAVIORAL HEALTH - GAIT / STATION
Other
Abnormal gait / station
Other
Abnormal gait / station
Other

## 2020-03-18 NOTE — PROGRESS NOTE ADULT - PROBLEM SELECTOR PLAN 1
Likely underlying rapidly progressive dementia, vs hospital delirium vs sequelae from stroke/SDH.  Likely patient's new mental status  -zyprexa 2.5 BID per psych, will verify if pt is stable for rehab  -holding off on Haldol per family request  -Hypothyroidism possible etiology; , free T4 0.3. Endocrine following. Now on PO 175mcg synthroid qd. Plan to recheck free T4 and total T3 on 3/20  - cw prednisone 15mg.   - consider scheduling family meeting with both psych and neurology involvement to address patient's final treatment/medication plan and disposition ([facility] vs home with home care)

## 2020-03-18 NOTE — PROGRESS NOTE BEHAVIORAL HEALTH - ORIENTED TO PLACE
Called and spoke to pt, given MD message below. Patient verbalized understanding and had no further questions/concerns.
No
Yes
No
Yes
Yes
Other
Yes
Other
Yes
Yes
Other
Yes
Yes
Other
Yes

## 2020-03-18 NOTE — PROGRESS NOTE BEHAVIORAL HEALTH - NSBHFUPTYPE_PSY_A_CORE
Consult Follow Up

## 2020-03-18 NOTE — PROGRESS NOTE BEHAVIORAL HEALTH - NSBHFUPINTERVALCCFT_PSY_A_CORE
"Don't ask me what year it is, you know what year it is!"
"I don't belong here, call my doctor."
"I don't belong here."
"I don't need to be here, nothing wrong with me!"
"I want my gourmet food!"
"I was supposed to be discharged yesterday"
"I was supposed to go home today."
"I'm feeling better today, how are you?"
"Is it Shawn yet?
"Just get me out."
"Stop asking me questions!"
"Stop asking me questions!"
"Tell the team to help me."
"Tell the team to help me."
I am fine
I don't have mental issues
confused
refusing to answer
"they tried to slip me that psychotic medication"

## 2020-03-18 NOTE — PROGRESS NOTE BEHAVIORAL HEALTH - NS ED [facility] MED ROS PSYCHIATRIC
See HPI

## 2020-03-18 NOTE — CHART NOTE - NSCHARTNOTEFT_GEN_A_CORE
Chart reviewed.  DM:   She did not get her pre-dinner humalog yesterday as she didn't eat.  Overall, FS's have been ok over past 24h.   Plan:  Plan: -test BG AC/HS  -C/w Lantus 26 units QAM.  -C/w humalog dose to 14-12-10 units premeal   -C/w humalog mod dose sliding scale ac meals and hs.     Discharge plan: basal/bolus    Hypothyroidism:  Plan:   -Pt currently on PO Synthroid.   -Please administer Synthroid 87.5 mcg IVP whenever pt refuses PO dose   -C/w multivitamins and Protonix administratio at 12 noon instead of am  -Re test Free T4 and Total T3 3/20/20. No need to test TSH since it will still be elevated.

## 2020-03-18 NOTE — PROGRESS NOTE BEHAVIORAL HEALTH - RELATEDNESS
Fair
Poor
Fair
Poor
Fair
Fair
Good
Poor
Good
Poor
Fair
Poor
Poor
Good
Poor
Poor
Good
Poor

## 2020-03-18 NOTE — PROGRESS NOTE BEHAVIORAL HEALTH - LANGUAGE
No abnormalities noted

## 2020-03-19 LAB
GLUCOSE BLDC GLUCOMTR-MCNC: 193 MG/DL — HIGH (ref 70–99)
GLUCOSE BLDC GLUCOMTR-MCNC: 297 MG/DL — HIGH (ref 70–99)
GLUCOSE BLDC GLUCOMTR-MCNC: 89 MG/DL — SIGNIFICANT CHANGE UP (ref 70–99)
T3 SERPL-MCNC: 67 NG/DL — LOW (ref 80–200)
T4 FREE SERPL-MCNC: 2.3 NG/DL — HIGH (ref 0.9–1.8)

## 2020-03-19 PROCEDURE — 99232 SBSQ HOSP IP/OBS MODERATE 35: CPT | Mod: GC

## 2020-03-19 PROCEDURE — 99232 SBSQ HOSP IP/OBS MODERATE 35: CPT

## 2020-03-19 RX ORDER — PRIMIDONE 250 MG/1
1 TABLET ORAL
Qty: 0 | Refills: 0 | DISCHARGE
Start: 2020-03-19

## 2020-03-19 RX ORDER — SIMVASTATIN 20 MG/1
1 TABLET, FILM COATED ORAL
Qty: 0 | Refills: 0 | DISCHARGE

## 2020-03-19 RX ORDER — ISOPROPYL ALCOHOL, BENZOCAINE .7; .06 ML/ML; ML/ML
1 SWAB TOPICAL
Qty: 100 | Refills: 1
Start: 2020-03-19 | End: 2020-05-07

## 2020-03-19 RX ORDER — SITAGLIPTIN 50 MG/1
1 TABLET, FILM COATED ORAL
Qty: 0 | Refills: 0 | DISCHARGE

## 2020-03-19 RX ORDER — PRIMIDONE 250 MG/1
1 TABLET ORAL
Qty: 30 | Refills: 0
Start: 2020-03-19 | End: 2020-04-17

## 2020-03-19 RX ORDER — METFORMIN HYDROCHLORIDE 850 MG/1
1 TABLET ORAL
Qty: 60 | Refills: 0
Start: 2020-03-19 | End: 2020-04-17

## 2020-03-19 RX ORDER — PANTOPRAZOLE SODIUM 20 MG/1
1 TABLET, DELAYED RELEASE ORAL
Qty: 30 | Refills: 0
Start: 2020-03-19 | End: 2020-04-17

## 2020-03-19 RX ORDER — PANTOPRAZOLE SODIUM 20 MG/1
1 TABLET, DELAYED RELEASE ORAL
Qty: 0 | Refills: 0 | DISCHARGE

## 2020-03-19 RX ORDER — LOSARTAN POTASSIUM 100 MG/1
1 TABLET, FILM COATED ORAL
Qty: 30 | Refills: 0
Start: 2020-03-19 | End: 2020-04-17

## 2020-03-19 RX ORDER — LOSARTAN POTASSIUM 100 MG/1
1 TABLET, FILM COATED ORAL
Qty: 0 | Refills: 0 | DISCHARGE
Start: 2020-03-19

## 2020-03-19 RX ORDER — IPRATROPIUM/ALBUTEROL SULFATE 18-103MCG
3 AEROSOL WITH ADAPTER (GRAM) INHALATION
Qty: 360 | Refills: 0
Start: 2020-03-19 | End: 2020-04-17

## 2020-03-19 RX ORDER — LEVOTHYROXINE SODIUM 125 MCG
1 TABLET ORAL
Qty: 30 | Refills: 0
Start: 2020-03-19 | End: 2020-04-17

## 2020-03-19 RX ORDER — SITAGLIPTIN 50 MG/1
2 TABLET, FILM COATED ORAL
Qty: 60 | Refills: 0
Start: 2020-03-19 | End: 2020-04-17

## 2020-03-19 RX ORDER — ENOXAPARIN SODIUM 100 MG/ML
20 INJECTION SUBCUTANEOUS
Qty: 0 | Refills: 0 | DISCHARGE

## 2020-03-19 RX ORDER — OLANZAPINE 15 MG/1
1 TABLET, FILM COATED ORAL
Qty: 60 | Refills: 0
Start: 2020-03-19 | End: 2020-04-17

## 2020-03-19 RX ORDER — SIMVASTATIN 20 MG/1
1 TABLET, FILM COATED ORAL
Qty: 0 | Refills: 0 | DISCHARGE
Start: 2020-03-19

## 2020-03-19 RX ORDER — LANOLIN ALCOHOL/MO/W.PET/CERES
1 CREAM (GRAM) TOPICAL
Qty: 0 | Refills: 0 | DISCHARGE
Start: 2020-03-19

## 2020-03-19 RX ORDER — TRAMADOL HYDROCHLORIDE 50 MG/1
1 TABLET ORAL
Qty: 0 | Refills: 0 | DISCHARGE

## 2020-03-19 RX ORDER — TRAMADOL HYDROCHLORIDE 50 MG/1
0.5 TABLET ORAL
Qty: 0 | Refills: 0 | DISCHARGE
Start: 2020-03-19

## 2020-03-19 RX ORDER — LEVOTHYROXINE SODIUM 125 MCG
1 TABLET ORAL
Qty: 0 | Refills: 0 | DISCHARGE

## 2020-03-19 RX ORDER — THIAMINE MONONITRATE (VIT B1) 100 MG
1 TABLET ORAL
Qty: 0 | Refills: 0 | DISCHARGE
Start: 2020-03-19

## 2020-03-19 RX ORDER — PRIMIDONE 250 MG/1
1 TABLET ORAL
Qty: 0 | Refills: 0 | DISCHARGE

## 2020-03-19 RX ORDER — NYSTATIN CREAM 100000 [USP'U]/G
1 CREAM TOPICAL
Qty: 0 | Refills: 0 | DISCHARGE
Start: 2020-03-19

## 2020-03-19 RX ORDER — METOPROLOL TARTRATE 50 MG
1 TABLET ORAL
Qty: 0 | Refills: 0 | DISCHARGE
Start: 2020-03-19

## 2020-03-19 RX ORDER — METOPROLOL TARTRATE 50 MG
1 TABLET ORAL
Qty: 0 | Refills: 0 | DISCHARGE

## 2020-03-19 RX ORDER — PANTOPRAZOLE SODIUM 20 MG/1
1 TABLET, DELAYED RELEASE ORAL
Qty: 0 | Refills: 0 | DISCHARGE
Start: 2020-03-19

## 2020-03-19 RX ORDER — MULTIVIT-MIN/FERROUS GLUCONATE 9 MG/15 ML
0 LIQUID (ML) ORAL
Qty: 0 | Refills: 0 | DISCHARGE
Start: 2020-03-19

## 2020-03-19 RX ORDER — LEVOTHYROXINE SODIUM 125 MCG
1 TABLET ORAL
Qty: 0 | Refills: 0 | DISCHARGE
Start: 2020-03-19

## 2020-03-19 RX ORDER — IPRATROPIUM/ALBUTEROL SULFATE 18-103MCG
3 AEROSOL WITH ADAPTER (GRAM) INHALATION
Qty: 0 | Refills: 0 | DISCHARGE
Start: 2020-03-19

## 2020-03-19 RX ORDER — LANOLIN ALCOHOL/MO/W.PET/CERES
1 CREAM (GRAM) TOPICAL
Qty: 30 | Refills: 0
Start: 2020-03-19 | End: 2020-04-17

## 2020-03-19 RX ORDER — METOPROLOL TARTRATE 50 MG
1 TABLET ORAL
Qty: 60 | Refills: 0
Start: 2020-03-19 | End: 2020-04-17

## 2020-03-19 RX ORDER — OLANZAPINE 15 MG/1
1 TABLET, FILM COATED ORAL
Qty: 0 | Refills: 0 | DISCHARGE
Start: 2020-03-19

## 2020-03-19 RX ORDER — ENOXAPARIN SODIUM 100 MG/ML
25 INJECTION SUBCUTANEOUS
Qty: 7.5 | Refills: 0
Start: 2020-03-19 | End: 2020-04-17

## 2020-03-19 RX ORDER — SIMVASTATIN 20 MG/1
1 TABLET, FILM COATED ORAL
Qty: 30 | Refills: 0
Start: 2020-03-19 | End: 2020-04-17

## 2020-03-19 RX ADMIN — Medication 3 MILLILITER(S): at 11:35

## 2020-03-19 RX ADMIN — BUDESONIDE AND FORMOTEROL FUMARATE DIHYDRATE 2 PUFF(S): 160; 4.5 AEROSOL RESPIRATORY (INHALATION) at 17:39

## 2020-03-19 RX ADMIN — Medication 2: at 08:58

## 2020-03-19 RX ADMIN — NYSTATIN CREAM 1 APPLICATION(S): 100000 CREAM TOPICAL at 17:45

## 2020-03-19 RX ADMIN — Medication 12 UNIT(S): at 12:38

## 2020-03-19 RX ADMIN — BUDESONIDE AND FORMOTEROL FUMARATE DIHYDRATE 2 PUFF(S): 160; 4.5 AEROSOL RESPIRATORY (INHALATION) at 06:04

## 2020-03-19 RX ADMIN — OLANZAPINE 2.5 MILLIGRAM(S): 15 TABLET, FILM COATED ORAL at 17:40

## 2020-03-19 RX ADMIN — INSULIN GLARGINE 26 UNIT(S): 100 INJECTION, SOLUTION SUBCUTANEOUS at 08:59

## 2020-03-19 RX ADMIN — Medication 100 MILLIGRAM(S): at 11:35

## 2020-03-19 RX ADMIN — Medication 14 UNIT(S): at 08:59

## 2020-03-19 RX ADMIN — SIMVASTATIN 20 MILLIGRAM(S): 20 TABLET, FILM COATED ORAL at 21:53

## 2020-03-19 RX ADMIN — PRIMIDONE 50 MILLIGRAM(S): 250 TABLET ORAL at 21:53

## 2020-03-19 RX ADMIN — OLANZAPINE 2.5 MILLIGRAM(S): 15 TABLET, FILM COATED ORAL at 06:05

## 2020-03-19 RX ADMIN — LOSARTAN POTASSIUM 25 MILLIGRAM(S): 100 TABLET, FILM COATED ORAL at 06:05

## 2020-03-19 RX ADMIN — Medication 15 MILLILITER(S): at 11:35

## 2020-03-19 RX ADMIN — Medication 6: at 12:38

## 2020-03-19 RX ADMIN — Medication 25 MILLIGRAM(S): at 06:05

## 2020-03-19 RX ADMIN — PANTOPRAZOLE SODIUM 40 MILLIGRAM(S): 20 TABLET, DELAYED RELEASE ORAL at 11:35

## 2020-03-19 RX ADMIN — Medication 25 MILLIGRAM(S): at 17:39

## 2020-03-19 RX ADMIN — Medication 15 MILLIGRAM(S): at 06:04

## 2020-03-19 RX ADMIN — Medication 175 MICROGRAM(S): at 06:05

## 2020-03-19 RX ADMIN — HEPARIN SODIUM 5000 UNIT(S): 5000 INJECTION INTRAVENOUS; SUBCUTANEOUS at 17:39

## 2020-03-19 RX ADMIN — HEPARIN SODIUM 5000 UNIT(S): 5000 INJECTION INTRAVENOUS; SUBCUTANEOUS at 09:53

## 2020-03-19 NOTE — PROGRESS NOTE ADULT - PROBLEM SELECTOR PLAN 2
Discharge on Synthroid 175mcg po qdaily - give an hour before other pills and food  Jia Jules MD, covering 3/19/2020, other days call the office number below  Pager: [phone]  Nights and Weekends: [phone]

## 2020-03-19 NOTE — PROGRESS NOTE ADULT - SUBJECTIVE AND OBJECTIVE BOX
Chief Complaint/Follow-up on: T2D/hypothyroidism    Subjective: Patient was transferred to [facility] this am. She endorses a good appetite. Ate her roll and drank coffee. She was upset that the shot they gave her this morning put her to sleep. Patient continues to hurl insults at staff and family members. She also adamantly denies having diabetes and told me I was "a fool". The team is preparing her for discharge.    MEDICATIONS  (STANDING):  budesonide  80 MICROgram(s)/formoterol 4.5 MICROgram(s) Inhaler 2 Puff(s) Inhalation two times a day  dextrose 5%. 1000 milliLiter(s) (50 mL/Hr) IV Continuous <Continuous>  dextrose 50% Injectable 12.5 Gram(s) IV Push once  dextrose 50% Injectable 25 Gram(s) IV Push once  dextrose 50% Injectable 25 Gram(s) IV Push once  heparin  Injectable 5000 Unit(s) SubCutaneous every 8 hours  influenza   Vaccine 0.5 milliLiter(s) IntraMuscular once  insulin glargine Injectable (LANTUS) 26 Unit(s) SubCutaneous <User Schedule>  insulin lispro (HumaLOG) corrective regimen sliding scale   SubCutaneous three times a day before meals  insulin lispro (HumaLOG) corrective regimen sliding scale   SubCutaneous at bedtime  insulin lispro Injectable (HumaLOG) 14 Unit(s) SubCutaneous before breakfast  insulin lispro Injectable (HumaLOG) 12 Unit(s) SubCutaneous before lunch  insulin lispro Injectable (HumaLOG) 10 Unit(s) SubCutaneous before dinner  levothyroxine 175 MICROGram(s) Oral daily  losartan 25 milliGRAM(s) Oral daily  metoprolol tartrate 25 milliGRAM(s) Oral two times a day  multivitamin/minerals/iron Oral Solution (CENTRUM) 15 milliLiter(s) Oral daily  nystatin Powder 1 Application(s) Topical every 12 hours  OLANZapine 2.5 milliGRAM(s) Oral two times a day  pantoprazole    Tablet 40 milliGRAM(s) Oral <User Schedule>  predniSONE   Tablet 15 milliGRAM(s) Oral daily  primidone 50 milliGRAM(s) Oral at bedtime  simvastatin 20 milliGRAM(s) Oral at bedtime  thiamine 100 milliGRAM(s) Oral daily  traMADol 25 milliGRAM(s) Oral once    MEDICATIONS  (PRN):  acetaminophen   Tablet .. 975 milliGRAM(s) Oral every 6 hours PRN Moderate Pain (4 - 6), Severe Pain (7 - 10)  acetaminophen  Suppository .. 650 milliGRAM(s) Rectal every 6 hours PRN Temp greater or equal to 38C (100.4F)  albuterol/ipratropium for Nebulization 3 milliLiter(s) Nebulizer every 6 hours PRN Shortness of Breath and/or Wheezing  dextrose 40% Gel 15 Gram(s) Oral once PRN Blood Glucose LESS THAN 70 milliGRAM(s)/deciLiter  glucagon  Injectable 1 milliGRAM(s) IntraMuscular once PRN Glucose <70 milliGRAM(s)/deciLiter  melatonin 3 milliGRAM(s) Oral at bedtime PRN Insomnia      PHYSICAL EXAM:  VITALS: T(C): 36.9 (03-18-20 @ 15:00)  T(F): 98.5 (03-18-20 @ 15:00), Max: 98.5 (03-18-20 @ 15:00)  HR: 104 (03-19-20 @ 06:00) (84 - 104)  BP: 117/72 (03-19-20 @ 06:00) (117/72 - 144/81)  RR:  (18 - 18)  SpO2:  (95% - 96%)  Wt(kg): --  GENERAL: NAD, well-groomed, well-developed  HEENT:  Atraumatic, Normocephalic, moist mucous membranes  RESPIRATORY: Clear to auscultation bilaterally; No rales, rhonchi, wheezing, or rubs  CARDIOVASCULAR: Regular rate and rhythm; No murmurs  GI: Soft, nontender, non distended, normal bowel sounds      POCT Blood Glucose.: 297 mg/dL (03-19-20 @ 12:23)  POCT Blood Glucose.: 193 mg/dL (03-19-20 @ 08:49)  POCT Blood Glucose.: 103 mg/dL (03-18-20 @ 22:00)  POCT Blood Glucose.: 226 mg/dL (03-18-20 @ 18:20)  POCT Blood Glucose.: 175 mg/dL (03-18-20 @ 11:58)  POCT Blood Glucose.: 170 mg/dL (03-18-20 @ 08:26)  POCT Blood Glucose.: 182 mg/dL (03-17-20 @ 17:47)  POCT Blood Glucose.: 183 mg/dL (03-17-20 @ 12:46)  POCT Blood Glucose.: 162 mg/dL (03-17-20 @ 07:56)  POCT Blood Glucose.: 222 mg/dL (03-16-20 @ 17:10)    03-18    141  |  106  |  25<H>  ----------------------------<  176<H>  4.2   |  20<L>  |  1.00    EGFR if : 70  EGFR if non : 60    Ca    9.6      03-18  Mg     1.9     03-18  Phos  3.6     03-18    TPro  6.9  /  Alb  3.6  /  TBili  0.1<L>  /  DBili  x   /  AST  22  /  ALT  23  /  AlkPhos  107  03-18          Thyroid Function Tests:  03-17 @ 14:52 TSH 13.20   02-23 @ 13:25 T3 53       Hemoglobin A1C, Whole Blood: 9.0 % <H> [4.0 - 5.6] (01-28-20 @ 19:30)  Hemoglobin A1C, Whole Blood: 9.2 % <H> [4.0 - 5.6] (01-27-20 @ 17:36)

## 2020-03-19 NOTE — PROGRESS NOTE ADULT - ASSESSMENT
63 y/o F w/h/o uncontrolled T2DM with unknown complications. Also h/o functional paraplegia, hypothyroidism, pulmonary hypertension, COPD, RA on chronic prednisone, SBO s/p ex lap with lysis of adhesions c/b multifocal pneumonia with AMS of unknown etiology. Pt was on catatonic state that resolved with ativan administration and is now more alert but still confused and refusing meds/treatments on and off. Refusing meds/treatments on and off. Glycemic control fair with intermittent hyperglycemia depending on PO intake and if pt takes insulin as ordered. Taking synthroid and Prednisone more consistently now. No hypoglycemia. Since there is not a trend on BG levels will need to continue present insulin regimen for now. Noted not having lunch for the past 2 days so not premeal insulin has been given at this time. Primary team aware to give IV doses of steroid and synthroid whenever pt refuses these meds via PO. Will revaluate Total T3 and Free T4 3/20 to assess if levels are improving. No need to test TSH since will likely still above goal.    Spent 15 minutes assessing pt/labs/meds and discussing plan of care with primary team/staff

## 2020-03-19 NOTE — PROGRESS NOTE ADULT - SUBJECTIVE AND OBJECTIVE BOX
Glenn Ortega, PGY1  Pager: [place] [phone], [place] [phone]    ----------------------------------------------------------------------------------------------------------------------------------------------------------------------------------    PROGRESS NOTE:     Patient is a 64y old  Female who presents with a chief complaint of change in mental status (18 Mar 2020 09:05)      SUBJECTIVE / OVERNIGHT EVENTS:    ADDITIONAL REVIEW OF SYSTEMS:    MEDICATIONS  (STANDING):  budesonide  80 MICROgram(s)/formoterol 4.5 MICROgram(s) Inhaler 2 Puff(s) Inhalation two times a day  dextrose 5%. 1000 milliLiter(s) (50 mL/Hr) IV Continuous <Continuous>  dextrose 50% Injectable 12.5 Gram(s) IV Push once  dextrose 50% Injectable 25 Gram(s) IV Push once  dextrose 50% Injectable 25 Gram(s) IV Push once  heparin  Injectable 5000 Unit(s) SubCutaneous every 8 hours  influenza   Vaccine 0.5 milliLiter(s) IntraMuscular once  insulin glargine Injectable (LANTUS) 26 Unit(s) SubCutaneous <User Schedule>  insulin lispro (HumaLOG) corrective regimen sliding scale   SubCutaneous three times a day before meals  insulin lispro (HumaLOG) corrective regimen sliding scale   SubCutaneous at bedtime  insulin lispro Injectable (HumaLOG) 14 Unit(s) SubCutaneous before breakfast  insulin lispro Injectable (HumaLOG) 12 Unit(s) SubCutaneous before lunch  insulin lispro Injectable (HumaLOG) 10 Unit(s) SubCutaneous before dinner  levothyroxine 175 MICROGram(s) Oral daily  losartan 25 milliGRAM(s) Oral daily  metoprolol tartrate 25 milliGRAM(s) Oral two times a day  multivitamin/minerals/iron Oral Solution (CENTRUM) 15 milliLiter(s) Oral daily  nystatin Powder 1 Application(s) Topical every 12 hours  OLANZapine 2.5 milliGRAM(s) Oral two times a day  pantoprazole    Tablet 40 milliGRAM(s) Oral <User Schedule>  predniSONE   Tablet 15 milliGRAM(s) Oral daily  primidone 50 milliGRAM(s) Oral at bedtime  simvastatin 20 milliGRAM(s) Oral at bedtime  thiamine 100 milliGRAM(s) Oral daily  traMADol 25 milliGRAM(s) Oral once    MEDICATIONS  (PRN):  acetaminophen   Tablet .. 975 milliGRAM(s) Oral every 6 hours PRN Moderate Pain (4 - 6), Severe Pain (7 - 10)  acetaminophen  Suppository .. 650 milliGRAM(s) Rectal every 6 hours PRN Temp greater or equal to 38C (100.4F)  albuterol/ipratropium for Nebulization 3 milliLiter(s) Nebulizer every 6 hours PRN Shortness of Breath and/or Wheezing  dextrose 40% Gel 15 Gram(s) Oral once PRN Blood Glucose LESS THAN 70 milliGRAM(s)/deciLiter  glucagon  Injectable 1 milliGRAM(s) IntraMuscular once PRN Glucose <70 milliGRAM(s)/deciLiter  melatonin 3 milliGRAM(s) Oral at bedtime PRN Insomnia      CAPILLARY BLOOD GLUCOSE      POCT Blood Glucose.: 193 mg/dL (19 Mar 2020 08:49)  POCT Blood Glucose.: 103 mg/dL (18 Mar 2020 22:00)  POCT Blood Glucose.: 226 mg/dL (18 Mar 2020 18:20)  POCT Blood Glucose.: 175 mg/dL (18 Mar 2020 11:58)    I&O's Summary    18 Mar 2020 07:01  -  19 Mar 2020 07:00  --------------------------------------------------------  IN: 150 mL / OUT: 0 mL / NET: 150 mL        PHYSICAL EXAM:  Vital Signs Last 24 Hrs  T(C): 36.9 (18 Mar 2020 15:00), Max: 36.9 (18 Mar 2020 15:00)  T(F): 98.5 (18 Mar 2020 15:00), Max: 98.5 (18 Mar 2020 15:00)  HR: 104 (19 Mar 2020 06:00) (84 - 104)  BP: 117/72 (19 Mar 2020 06:00) (117/72 - 144/81)  BP(mean): --  RR: 18 (19 Mar 2020 06:00) (18 - 18)  SpO2: 95% (19 Mar 2020 06:00) (95% - 96%)    CONSTITUTIONAL: NAD, well-developed  RESPIRATORY: Normal respiratory effort; lungs are clear to auscultation bilaterally  CARDIOVASCULAR: normal S1 and S2, rrr, no mrg; No lower extremity edema; Peripheral pulses are 2+ bilaterally  ABDOMEN: soft, NTND, BS+; No hepatosplenomegaly  MUSCULOSKELETAL: no clubbing or cyanosis of digits; no joint swelling or tenderness to palpation  PSYCH: A+O to person, place, and time; affect appropriate    LABS:                        9.4    9.85  )-----------( 393      ( 18 Mar 2020 08:11 )             32.6     03-18    141  |  106  |  25<H>  ----------------------------<  176<H>  4.2   |  20<L>  |  1.00    Ca    9.6      18 Mar 2020 08:11  Phos  3.6     03-18  Mg     1.9     03-18    TPro  6.9  /  Alb  3.6  /  TBili  0.1<L>  /  DBili  x   /  AST  22  /  ALT  23  /  AlkPhos  107  03-18                RADIOLOGY & ADDITIONAL TESTS:  Results Reviewed:   Imaging Personally Reviewed:  Electrocardiogram Personally Reviewed: Glenn Ortega, PGY1  Pager: [place] [phone], [facility] [phone]    ----------------------------------------------------------------------------------------------------------------------------------------------------------------------------------    PROGRESS NOTE:     Patient is a 64y old  Female who presents with a chief complaint of change in mental status (18 Mar 2020 09:05)      SUBJECTIVE / OVERNIGHT EVENTS: pt refused heparin, bedtime ISS, primidone, and statin overnight, but took zyprexa. States that she had abd pain yesterday that has now resolved. Feels well overall, would like to go home.     ADDITIONAL REVIEW OF SYSTEMS:   CONSTITUTIONAL: No weakness, fevers or chills  RESPIRATORY: No cough, wheezing, hemoptysis; No shortness of breath  CARDIOVASCULAR: No chest pain or palpitations; No lower extremity edema  GASTROINTESTINAL: No abdominal or epigastric pain. No nausea, vomiting, or hematemesis; No diarrhea or constipation. No melena or hematochezia.    MEDICATIONS  (STANDING):  budesonide  80 MICROgram(s)/formoterol 4.5 MICROgram(s) Inhaler 2 Puff(s) Inhalation two times a day  dextrose 5%. 1000 milliLiter(s) (50 mL/Hr) IV Continuous <Continuous>  dextrose 50% Injectable 12.5 Gram(s) IV Push once  dextrose 50% Injectable 25 Gram(s) IV Push once  dextrose 50% Injectable 25 Gram(s) IV Push once  heparin  Injectable 5000 Unit(s) SubCutaneous every 8 hours  influenza   Vaccine 0.5 milliLiter(s) IntraMuscular once  insulin glargine Injectable (LANTUS) 26 Unit(s) SubCutaneous <User Schedule>  insulin lispro (HumaLOG) corrective regimen sliding scale   SubCutaneous three times a day before meals  insulin lispro (HumaLOG) corrective regimen sliding scale   SubCutaneous at bedtime  insulin lispro Injectable (HumaLOG) 14 Unit(s) SubCutaneous before breakfast  insulin lispro Injectable (HumaLOG) 12 Unit(s) SubCutaneous before lunch  insulin lispro Injectable (HumaLOG) 10 Unit(s) SubCutaneous before dinner  levothyroxine 175 MICROGram(s) Oral daily  losartan 25 milliGRAM(s) Oral daily  metoprolol tartrate 25 milliGRAM(s) Oral two times a day  multivitamin/minerals/iron Oral Solution (CENTRUM) 15 milliLiter(s) Oral daily  nystatin Powder 1 Application(s) Topical every 12 hours  OLANZapine 2.5 milliGRAM(s) Oral two times a day  pantoprazole    Tablet 40 milliGRAM(s) Oral <User Schedule>  predniSONE   Tablet 15 milliGRAM(s) Oral daily  primidone 50 milliGRAM(s) Oral at bedtime  simvastatin 20 milliGRAM(s) Oral at bedtime  thiamine 100 milliGRAM(s) Oral daily  traMADol 25 milliGRAM(s) Oral once    MEDICATIONS  (PRN):  acetaminophen   Tablet .. 975 milliGRAM(s) Oral every 6 hours PRN Moderate Pain (4 - 6), Severe Pain (7 - 10)  acetaminophen  Suppository .. 650 milliGRAM(s) Rectal every 6 hours PRN Temp greater or equal to 38C (100.4F)  albuterol/ipratropium for Nebulization 3 milliLiter(s) Nebulizer every 6 hours PRN Shortness of Breath and/or Wheezing  dextrose 40% Gel 15 Gram(s) Oral once PRN Blood Glucose LESS THAN 70 milliGRAM(s)/deciLiter  glucagon  Injectable 1 milliGRAM(s) IntraMuscular once PRN Glucose <70 milliGRAM(s)/deciLiter  melatonin 3 milliGRAM(s) Oral at bedtime PRN Insomnia      CAPILLARY BLOOD GLUCOSE      POCT Blood Glucose.: 193 mg/dL (19 Mar 2020 08:49)  POCT Blood Glucose.: 103 mg/dL (18 Mar 2020 22:00)  POCT Blood Glucose.: 226 mg/dL (18 Mar 2020 18:20)  POCT Blood Glucose.: 175 mg/dL (18 Mar 2020 11:58)    I&O's Summary    18 Mar 2020 07:01  -  19 Mar 2020 07:00  --------------------------------------------------------  IN: 150 mL / OUT: 0 mL / NET: 150 mL        PHYSICAL EXAM:  Vital Signs Last 24 Hrs  T(C): 36.9 (18 Mar 2020 15:00), Max: 36.9 (18 Mar 2020 15:00)  T(F): 98.5 (18 Mar 2020 15:00), Max: 98.5 (18 Mar 2020 15:00)  HR: 104 (19 Mar 2020 06:00) (84 - 104)  BP: 117/72 (19 Mar 2020 06:00) (117/72 - 144/81)  BP(mean): --  RR: 18 (19 Mar 2020 06:00) (18 - 18)  SpO2: 95% (19 Mar 2020 06:00) (95% - 96%)    Refused physical exam.    LABS:                        9.4    9.85  )-----------( 393      ( 18 Mar 2020 08:11 )             32.6     03-18    141  |  106  |  25<H>  ----------------------------<  176<H>  4.2   |  20<L>  |  1.00    Ca    9.6      18 Mar 2020 08:11  Phos  3.6     03-18  Mg     1.9     03-18    TPro  6.9  /  Alb  3.6  /  TBili  0.1<L>  /  DBili  x   /  AST  22  /  ALT  23  /  AlkPhos  107  03-18                RADIOLOGY & ADDITIONAL TESTS:  Results Reviewed:   Imaging Personally Reviewed:  Electrocardiogram Personally Reviewed: Glenn Ortega, PGY1  Pager: [place] [phone], [facility] [phone]    ----------------------------------------------------------------------------------------------------------------------------------------------------------------------------------    PROGRESS NOTE:     Patient is a 64y old  Female who presents with a chief complaint of change in mental status (18 Mar 2020 09:05)      SUBJECTIVE / OVERNIGHT EVENTS: pt refused heparin, bedtime ISS, primidone, and statin overnight, but took zyprexa. States that she had abd pain yesterday that has now resolved. Feels well overall, would like to go home.     ADDITIONAL REVIEW OF SYSTEMS:   CONSTITUTIONAL: No weakness, fevers or chills  RESPIRATORY: No cough, wheezing, hemoptysis; No shortness of breath  CARDIOVASCULAR: No chest pain or palpitations; No lower extremity edema  GASTROINTESTINAL: No abdominal or epigastric pain. No nausea, vomiting, or hematemesis; No diarrhea or constipation. No melena or hematochezia.    MEDICATIONS  (STANDING):  budesonide  80 MICROgram(s)/formoterol 4.5 MICROgram(s) Inhaler 2 Puff(s) Inhalation two times a day  dextrose 5%. 1000 milliLiter(s) (50 mL/Hr) IV Continuous <Continuous>  dextrose 50% Injectable 12.5 Gram(s) IV Push once  dextrose 50% Injectable 25 Gram(s) IV Push once  dextrose 50% Injectable 25 Gram(s) IV Push once  heparin  Injectable 5000 Unit(s) SubCutaneous every 8 hours  influenza   Vaccine 0.5 milliLiter(s) IntraMuscular once  insulin glargine Injectable (LANTUS) 26 Unit(s) SubCutaneous <User Schedule>  insulin lispro (HumaLOG) corrective regimen sliding scale   SubCutaneous three times a day before meals  insulin lispro (HumaLOG) corrective regimen sliding scale   SubCutaneous at bedtime  insulin lispro Injectable (HumaLOG) 14 Unit(s) SubCutaneous before breakfast  insulin lispro Injectable (HumaLOG) 12 Unit(s) SubCutaneous before lunch  insulin lispro Injectable (HumaLOG) 10 Unit(s) SubCutaneous before dinner  levothyroxine 175 MICROGram(s) Oral daily  losartan 25 milliGRAM(s) Oral daily  metoprolol tartrate 25 milliGRAM(s) Oral two times a day  multivitamin/minerals/iron Oral Solution (CENTRUM) 15 milliLiter(s) Oral daily  nystatin Powder 1 Application(s) Topical every 12 hours  OLANZapine 2.5 milliGRAM(s) Oral two times a day  pantoprazole    Tablet 40 milliGRAM(s) Oral <User Schedule>  predniSONE   Tablet 15 milliGRAM(s) Oral daily  primidone 50 milliGRAM(s) Oral at bedtime  simvastatin 20 milliGRAM(s) Oral at bedtime  thiamine 100 milliGRAM(s) Oral daily  traMADol 25 milliGRAM(s) Oral once    MEDICATIONS  (PRN):  acetaminophen   Tablet .. 975 milliGRAM(s) Oral every 6 hours PRN Moderate Pain (4 - 6), Severe Pain (7 - 10)  acetaminophen  Suppository .. 650 milliGRAM(s) Rectal every 6 hours PRN Temp greater or equal to 38C (100.4F)  albuterol/ipratropium for Nebulization 3 milliLiter(s) Nebulizer every 6 hours PRN Shortness of Breath and/or Wheezing  dextrose 40% Gel 15 Gram(s) Oral once PRN Blood Glucose LESS THAN 70 milliGRAM(s)/deciLiter  glucagon  Injectable 1 milliGRAM(s) IntraMuscular once PRN Glucose <70 milliGRAM(s)/deciLiter  melatonin 3 milliGRAM(s) Oral at bedtime PRN Insomnia      CAPILLARY BLOOD GLUCOSE      POCT Blood Glucose.: 193 mg/dL (19 Mar 2020 08:49)  POCT Blood Glucose.: 103 mg/dL (18 Mar 2020 22:00)  POCT Blood Glucose.: 226 mg/dL (18 Mar 2020 18:20)  POCT Blood Glucose.: 175 mg/dL (18 Mar 2020 11:58)    I&O's Summary    18 Mar 2020 07:01  -  19 Mar 2020 07:00  --------------------------------------------------------  IN: 150 mL / OUT: 0 mL / NET: 150 mL        PHYSICAL EXAM:  Vital Signs Last 24 Hrs  T(C): 36.9 (18 Mar 2020 15:00), Max: 36.9 (18 Mar 2020 15:00)  T(F): 98.5 (18 Mar 2020 15:00), Max: 98.5 (18 Mar 2020 15:00)  HR: 104 (19 Mar 2020 06:00) (84 - 104)  BP: 117/72 (19 Mar 2020 06:00) (117/72 - 144/81)  BP(mean): --  RR: 18 (19 Mar 2020 06:00) (18 - 18)  SpO2: 95% (19 Mar 2020 06:00) (95% - 96%)    Refused physical exam.    LABS:                        9.4    9.85  )-----------( 393      ( 18 Mar 2020 08:11 )             32.6     03-18    141  |  106  |  25<H>  ----------------------------<  176<H>  4.2   |  20<L>  |  1.00    Ca    9.6      18 Mar 2020 08:11  Phos  3.6     03-18  Mg     1.9     03-18    TPro  6.9  /  Alb  3.6  /  TBili  0.1<L>  /  DBili  x   /  AST  22  /  ALT  23  /  AlkPhos  107  03-18                RADIOLOGY & ADDITIONAL TESTS:  Results Reviewed:   Imaging Personally Reviewed:  Electrocardiogram Personally Reviewed:    Plan of care discussed with PT, CM, Endocrine

## 2020-03-19 NOTE — PROGRESS NOTE ADULT - PROBLEM SELECTOR PLAN 10
- DVT ppx: HSQ q8hrs   - Diet: speech soft diet  - Dispo: [facility] pending repeat PT eval vs home with home care     Transitions of Care Status:  1.  Name of PCP:  Kristie Nails MD (PCP)  2.  PCP Contacted on Admission: [ ] Y    [x] N    3.  PCP contacted at Discharge: [ ] Y    [ ] N    [ ] N/A  4.  Post-Discharge Appointment Date and Location:  5.  Summary of Handoff given to PCP: - DVT ppx: HSQ q8hrs   - Diet: speech soft diet  - Dispo: home w/ home care    Transitions of Care Status:  1.  Name of PCP:  Kristie Nails MD (PCP)  2.  PCP Contacted on Admission: [ ] Y    [x] N    3.  PCP contacted at Discharge: [ ] Y    [ ] N    [ ] N/A  4.  Post-Discharge Appointment Date and Location:  5.  Summary of Handoff given to PCP:

## 2020-03-19 NOTE — PROGRESS NOTE ADULT - ATTENDING COMMENTS
Patient seen and examined.    64F with a history of hypothyroidism, pHTN, IDDM, COPD/CHRIS, RA (on Prednisone), SBO s/p ex lap with lysis of adhesions c/b evisceration presented with encephalopathy and agitation found to be significantly hypothyroid (, without myxedema coma), small (stable) right sided SDH, UTI with hospital course complicated by adverse catatonic reaction to VPA (now resolved) and DKA (resolved) and ongoing psychiatric issues.    1. Psychiatric disease: She remains combative, and uncooperative, and delusional. Appreciate psychiatry guidance in terms of medication management. She is now on Zyprexa. And optimized from their perspective, no contraindication to discharge.   Patient does not have capacity to refuse essential medications: If patient refusing synthroid or prednisone PO, intravenous formulations should be given. Insulin should be given prior to meals.    2. Encephalopathy: Resolved, to new baseline per psychiatry. Likely multifactorial from hypothyroid, SDH, UTI, DKA. Now medically stable and treated. Per psych, multiple insults may have set a new baseline MS.    3. Hypothyroid, TSH >100 on presentation. No evidence of myxedema coma. Endocrine following. Will re-assess Free T4/ T3 on 3/20. We confirmed with them discharge plan and follow up    4. Diabetes: Continues to refuse insulin, medications. Endocrine assistance appreciated particularly in terms of discharge medications    Discharge planning: Underway. PT recommending home without needs. She has 24 hour care at home, we clarified with CM today that these services will be reinstated tomorrow. Discussed with Son Norris, will plan for discharge in AM.   She is otherwise medically ready.

## 2020-03-19 NOTE — PROGRESS NOTE ADULT - PROBLEM SELECTOR PLAN 1
Likely underlying rapidly progressive dementia, vs hospital delirium vs sequelae from stroke/SDH.  Likely patient's new mental status  -zyprexa 2.5 BID per psych, will verify if pt is stable for rehab  -holding off on Haldol per family request  -Hypothyroidism possible etiology; , free T4 0.3. Endocrine following. Now on PO 175mcg synthroid qd. Plan to recheck free T4 and total T3 on 3/20  - cw prednisone 15mg.   - consider scheduling family meeting with both psych and neurology involvement to address patient's final treatment/medication plan and disposition ([facility] vs home with home care) Likely underlying rapidly progressive dementia, vs hospital delirium vs sequelae from stroke/SDH.  Likely patient's new mental status  -zyprexa 2.5 BID. As per psych, pt is optimized on this regimen.  -Hypothyroidism possible etiology; , free T4 0.3. Endocrine following. Now on PO 175mcg synthroid qd. Plan to recheck free T4 and total T3 today.  - cw prednisone 15mg. Likely new baseline verse progressive dementia, vs hospital delirium vs sequelae from stroke/SDH.  -zyprexa 2.5 BID. As per psych, pt is optimized on this regimen.  -Hypothyroidism possible etiology; , free T4 0.3. Endocrine following. Now on PO 175mcg synthroid qd. Plan to recheck free T4 and total T3 today.  - cw prednisone 15mg.

## 2020-03-19 NOTE — PROGRESS NOTE ADULT - PROBLEM SELECTOR PLAN 1
-test BG AC/HS  - While inpatient continue Lantus 26 units SQ QAM and humalog 14-12-10 units sq pre-meal   -continue humalog mod dose sliding scale ac meals and hs.     Discharge plan: Lantus 25 units sq qam, metformin 500mg po bid and januvia 100mg po qdaily. Her behavioral issues and reliance on her son for insulin makes 4 shots/day impractical.

## 2020-03-19 NOTE — PROGRESS NOTE ADULT - ASSESSMENT
64F w/ a PMHx arthritis, hypothyroidism, pulmonary HTN, DM on insulin, HTN, COPD/CHRIS on noctournal CPAP and 3L home), RA on chronic prednisone, chronic tremors, SBO s/p ex lap with lysis of adhesions c/b evisceration p/w agitation 3 weeks of unclear etiology, found to be significantly hypothyroid (), MRI brain found to have small right sided SDH, hospital course c/b catatonia improved with Ativan. Course recently c/b DKA. Pending optimization of psychiatric regimen. 64F w/ a PMHx arthritis, hypothyroidism, pulmonary HTN, DM on insulin, HTN, COPD/CHRIS on noctournal CPAP and 3L home), RA on chronic prednisone, chronic tremors, SBO s/p ex lap with lysis of adhesions c/b evisceration p/w agitation 3 weeks of unclear etiology, found to be significantly hypothyroid (), MRI brain found to have small right sided SDH, hospital course c/b catatonia improved with Ativan. Course c/b DKA, now resolved. Psychiatric regimen has been optimized.

## 2020-03-20 LAB
GLUCOSE BLDC GLUCOMTR-MCNC: 246 MG/DL — HIGH (ref 70–99)
T3 SERPL-MCNC: 69 NG/DL — LOW (ref 80–200)
T4 FREE SERPL-MCNC: 2.3 NG/DL — HIGH (ref 0.9–1.8)

## 2020-03-20 PROCEDURE — 99232 SBSQ HOSP IP/OBS MODERATE 35: CPT | Mod: GC

## 2020-03-20 RX ORDER — PANTOPRAZOLE SODIUM 20 MG/1
1 TABLET, DELAYED RELEASE ORAL
Qty: 30 | Refills: 0
Start: 2020-03-20 | End: 2020-04-18

## 2020-03-20 RX ORDER — PRIMIDONE 250 MG/1
1 TABLET ORAL
Qty: 30 | Refills: 0
Start: 2020-03-20 | End: 2020-04-18

## 2020-03-20 RX ORDER — TRAMADOL HYDROCHLORIDE 50 MG/1
0.5 TABLET ORAL
Qty: 7.5 | Refills: 0
Start: 2020-03-20 | End: 2020-03-24

## 2020-03-20 RX ORDER — LOSARTAN POTASSIUM 100 MG/1
1 TABLET, FILM COATED ORAL
Qty: 30 | Refills: 0
Start: 2020-03-20 | End: 2020-04-18

## 2020-03-20 RX ORDER — SIMVASTATIN 20 MG/1
1 TABLET, FILM COATED ORAL
Qty: 30 | Refills: 0
Start: 2020-03-20 | End: 2020-04-18

## 2020-03-20 RX ORDER — METOPROLOL TARTRATE 50 MG
1 TABLET ORAL
Qty: 60 | Refills: 0
Start: 2020-03-20 | End: 2020-04-18

## 2020-03-20 RX ORDER — LEVOTHYROXINE SODIUM 125 MCG
1 TABLET ORAL
Qty: 30 | Refills: 0
Start: 2020-03-20 | End: 2020-04-18

## 2020-03-20 RX ORDER — OLANZAPINE 15 MG/1
1 TABLET, FILM COATED ORAL
Qty: 60 | Refills: 0
Start: 2020-03-20 | End: 2020-04-18

## 2020-03-20 RX ORDER — GABAPENTIN 400 MG/1
1 CAPSULE ORAL
Qty: 0 | Refills: 0 | DISCHARGE

## 2020-03-20 RX ORDER — SITAGLIPTIN 50 MG/1
2 TABLET, FILM COATED ORAL
Qty: 60 | Refills: 0
Start: 2020-03-20 | End: 2020-04-18

## 2020-03-20 RX ORDER — ENOXAPARIN SODIUM 100 MG/ML
25 INJECTION SUBCUTANEOUS
Qty: 7.5 | Refills: 0
Start: 2020-03-20 | End: 2020-04-18

## 2020-03-20 RX ORDER — INSULIN ASPART 100 [IU]/ML
8 INJECTION, SOLUTION SUBCUTANEOUS
Qty: 0 | Refills: 0 | DISCHARGE

## 2020-03-20 RX ORDER — ISOPROPYL ALCOHOL, BENZOCAINE .7; .06 ML/ML; ML/ML
1 SWAB TOPICAL
Qty: 100 | Refills: 1
Start: 2020-03-20 | End: 2020-05-08

## 2020-03-20 RX ORDER — LANOLIN ALCOHOL/MO/W.PET/CERES
1 CREAM (GRAM) TOPICAL
Qty: 30 | Refills: 0
Start: 2020-03-20 | End: 2020-04-18

## 2020-03-20 RX ORDER — METFORMIN HYDROCHLORIDE 850 MG/1
1 TABLET ORAL
Qty: 60 | Refills: 0
Start: 2020-03-20 | End: 2020-04-18

## 2020-03-20 RX ADMIN — BUDESONIDE AND FORMOTEROL FUMARATE DIHYDRATE 2 PUFF(S): 160; 4.5 AEROSOL RESPIRATORY (INHALATION) at 17:57

## 2020-03-20 RX ADMIN — TRAMADOL HYDROCHLORIDE 25 MILLIGRAM(S): 50 TABLET ORAL at 23:56

## 2020-03-20 RX ADMIN — Medication 4: at 09:00

## 2020-03-20 RX ADMIN — PRIMIDONE 50 MILLIGRAM(S): 250 TABLET ORAL at 23:22

## 2020-03-20 RX ADMIN — LOSARTAN POTASSIUM 25 MILLIGRAM(S): 100 TABLET, FILM COATED ORAL at 06:14

## 2020-03-20 RX ADMIN — Medication 100 MILLIGRAM(S): at 08:55

## 2020-03-20 RX ADMIN — HEPARIN SODIUM 5000 UNIT(S): 5000 INJECTION INTRAVENOUS; SUBCUTANEOUS at 17:06

## 2020-03-20 RX ADMIN — Medication 25 MILLIGRAM(S): at 06:14

## 2020-03-20 RX ADMIN — INSULIN GLARGINE 26 UNIT(S): 100 INJECTION, SOLUTION SUBCUTANEOUS at 09:03

## 2020-03-20 RX ADMIN — Medication 975 MILLIGRAM(S): at 20:31

## 2020-03-20 RX ADMIN — SIMVASTATIN 20 MILLIGRAM(S): 20 TABLET, FILM COATED ORAL at 23:23

## 2020-03-20 RX ADMIN — Medication 175 MICROGRAM(S): at 06:14

## 2020-03-20 RX ADMIN — Medication 3 MILLIGRAM(S): at 20:27

## 2020-03-20 RX ADMIN — Medication 15 MILLILITER(S): at 12:27

## 2020-03-20 RX ADMIN — HEPARIN SODIUM 5000 UNIT(S): 5000 INJECTION INTRAVENOUS; SUBCUTANEOUS at 10:18

## 2020-03-20 RX ADMIN — OLANZAPINE 2.5 MILLIGRAM(S): 15 TABLET, FILM COATED ORAL at 17:03

## 2020-03-20 RX ADMIN — Medication 14 UNIT(S): at 09:01

## 2020-03-20 RX ADMIN — Medication 15 MILLIGRAM(S): at 06:14

## 2020-03-20 RX ADMIN — Medication 975 MILLIGRAM(S): at 21:18

## 2020-03-20 RX ADMIN — BUDESONIDE AND FORMOTEROL FUMARATE DIHYDRATE 2 PUFF(S): 160; 4.5 AEROSOL RESPIRATORY (INHALATION) at 06:13

## 2020-03-20 RX ADMIN — PANTOPRAZOLE SODIUM 40 MILLIGRAM(S): 20 TABLET, DELAYED RELEASE ORAL at 08:54

## 2020-03-20 RX ADMIN — OLANZAPINE 2.5 MILLIGRAM(S): 15 TABLET, FILM COATED ORAL at 06:14

## 2020-03-20 NOTE — PROGRESS NOTE ADULT - PROBLEM SELECTOR PLAN 9
- DVT ppx: HSQ q8hrs   - Diet: speech soft diet  - Dispo: home w/ home care    Transitions of Care Status:  1.  Name of PCP:  Kristie Nails MD (PCP)  2.  PCP Contacted on Admission: [ ] Y    [x] N    3.  PCP contacted at Discharge: [ ] Y    [ ] N    [ ] N/A  4.  Post-Discharge Appointment Date and Location:  5.  Summary of Handoff given to PCP:

## 2020-03-20 NOTE — PROGRESS NOTE ADULT - ATTENDING COMMENTS
Patient seen and examined.    64F with a history of hypothyroidism, pHTN, IDDM, COPD/CHRIS, RA (on Prednisone), SBO s/p ex lap with lysis of adhesions c/b evisceration presented with encephalopathy and agitation found to be significantly hypothyroid (, without myxedema coma), small (stable) right sided SDH, UTI with hospital course complicated by adverse catatonic reaction to VPA (now resolved) and DKA (resolved) and ongoing psychiatric issues.    1. Psychiatric disease: She remains combative, and uncooperative, and delusional. Appreciate psychiatry guidance in terms of medication management. She is now on Zyprexa. And optimized from their perspective, no contraindication to discharge.   Patient does not have capacity to refuse essential medications: If patient refusing synthroid or prednisone PO, intravenous formulations should be given. Insulin should be given prior to meals.    2. Encephalopathy: Resolved, to new baseline per psychiatry. Likely multifactorial from hypothyroid, SDH, UTI, DKA. Now medically stable and treated. Per psych, multiple insults may have set a new baseline MS.    3. Hypothyroid, TSH >100 on presentation. No evidence of myxedema coma. Endocrine following. Will re-assess Free T4/ T3 on 3/20. We confirmed with them discharge plan and follow up    4. Diabetes: Continues to refuse insulin, medications. Endocrine assistance appreciated particularly in terms of discharge medications    Discharge planning: Underway. She has 24 hour care at home, which has been re-instated. Family agreeable to take her home with services in place. 47 minutes spent discharge planning and coordination

## 2020-03-20 NOTE — PROGRESS NOTE ADULT - ASSESSMENT
64F w/ a PMHx arthritis, hypothyroidism, pulmonary HTN, DM on insulin, HTN, COPD/CHRIS on noctournal CPAP and 3L home), RA on chronic prednisone, chronic tremors, SBO s/p ex lap with lysis of adhesions c/b evisceration p/w agitation 3 weeks of unclear etiology, found to be significantly hypothyroid (), MRI brain found to have small right sided SDH, hospital course c/b catatonia improved with Ativan. Course c/b DKA, now resolved. Psychiatric regimen has been optimized.

## 2020-03-20 NOTE — PROGRESS NOTE ADULT - SUBJECTIVE AND OBJECTIVE BOX
AFTAB BASS  64y  Female    Patient is a 64y old  Female who presents with a chief complaint of change in mental status (19 Mar 2020 13:54)    INTERVAL HPI/OVERNIGHT EVENTS:  -no acute events overnight  -pt reports feeling well today, upset that she did not go home yesterday  -reports abd pain resolved  -pt denies fevers, chills, palpitations, chest pain, SOB, abd pain, n/v/d/c     Medications:  acetaminophen   Tablet .. 975 milliGRAM(s) Oral every 6 hours PRN  acetaminophen  Suppository .. 650 milliGRAM(s) Rectal every 6 hours PRN  albuterol/ipratropium for Nebulization 3 milliLiter(s) Nebulizer every 6 hours PRN  budesonide  80 MICROgram(s)/formoterol 4.5 MICROgram(s) Inhaler 2 Puff(s) Inhalation two times a day  dextrose 40% Gel 15 Gram(s) Oral once PRN  dextrose 5%. 1000 milliLiter(s) IV Continuous <Continuous>  dextrose 50% Injectable 12.5 Gram(s) IV Push once  dextrose 50% Injectable 25 Gram(s) IV Push once  dextrose 50% Injectable 25 Gram(s) IV Push once  glucagon  Injectable 1 milliGRAM(s) IntraMuscular once PRN  heparin  Injectable 5000 Unit(s) SubCutaneous every 8 hours  influenza   Vaccine 0.5 milliLiter(s) IntraMuscular once  insulin glargine Injectable (LANTUS) 26 Unit(s) SubCutaneous <User Schedule>  insulin lispro (HumaLOG) corrective regimen sliding scale   SubCutaneous three times a day before meals  insulin lispro (HumaLOG) corrective regimen sliding scale   SubCutaneous at bedtime  insulin lispro Injectable (HumaLOG) 14 Unit(s) SubCutaneous before breakfast  insulin lispro Injectable (HumaLOG) 12 Unit(s) SubCutaneous before lunch  insulin lispro Injectable (HumaLOG) 10 Unit(s) SubCutaneous before dinner  levothyroxine 175 MICROGram(s) Oral daily  losartan 25 milliGRAM(s) Oral daily  melatonin 3 milliGRAM(s) Oral at bedtime PRN  metoprolol tartrate 25 milliGRAM(s) Oral two times a day  multivitamin/minerals/iron Oral Solution (CENTRUM) 15 milliLiter(s) Oral daily  nystatin Powder 1 Application(s) Topical every 12 hours  OLANZapine 2.5 milliGRAM(s) Oral two times a day  pantoprazole    Tablet 40 milliGRAM(s) Oral <User Schedule>  predniSONE   Tablet 15 milliGRAM(s) Oral daily  primidone 50 milliGRAM(s) Oral at bedtime  simvastatin 20 milliGRAM(s) Oral at bedtime  thiamine 100 milliGRAM(s) Oral daily  traMADol 25 milliGRAM(s) Oral once    T(C): --  HR: 105 (03-20-20 @ 06:09) (105 - 105)  BP: 110/70 (03-20-20 @ 06:09) (110/70 - 110/70)  RR: 18 (03-20-20 @ 06:09) (18 - 18)  SpO2: 96% (03-20-20 @ 06:09) (96% - 96%)    GENERAL: elderly, obese woman  HEAD:  Atraumatic, Normocephalic  EYES: EOMI, conjunctiva and sclera clear  NECK: Supple, No JVD  CHEST/LUNG: Clear to auscultation bilaterally; No wheeze  HEART: Regular rate and rhythm; No murmurs, rubs, or gallops  ABDOMEN: pt defered  EXTREMITIES:  2+ Peripheral Pulses, No clubbing, cyanosis, or edema  PSYCH: AAOx3  NEUROLOGY: moving all 4 extremities equally  SKIN: dressing over abdominal wound    Consultant(s) Notes Reviewed:  [x ] YES  [ ] NO  Care Discussed with Consultants/Other Providers [ x] YES  [ ] NO    LABS:  CAPILLARY BLOOD GLUCOSE  POCT Blood Glucose.: 89 mg/dL (19 Mar 2020 17:35)  POCT Blood Glucose.: 297 mg/dL (19 Mar 2020 12:23)

## 2020-03-20 NOTE — PROGRESS NOTE ADULT - PROBLEM SELECTOR PLAN 1
Likely new baseline verse progressive dementia, vs hospital delirium vs sequelae from stroke/SDH.  -zyprexa 2.5 BID. As per psych, pt is optimized on this regimen.  -Hypothyroidism possible etiology; pt responding well of levothyroxine treatment  - cw prednisone 15mg.

## 2020-03-20 NOTE — CHART NOTE - NSCHARTNOTEFT_GEN_A_CORE
Would increase to Lantus 28 Units in AM and Continue Humalog 14/12/10 in addition to moderate scale before meals and at bedtime. Check 2AM FS. Continue LT4 175 mcg po daily.   Navya Davidson (pager [phone])  On evenings and weekends, please call [phone] or page endocrine fellow on call.   Please note that this patient may be followed by different provider tomorrow. If no answer, contact endocrine fellow on call.

## 2020-03-21 DIAGNOSIS — F99 MENTAL DISORDER, NOT OTHERWISE SPECIFIED: ICD-10-CM

## 2020-03-21 LAB
GLUCOSE BLDC GLUCOMTR-MCNC: 150 MG/DL — HIGH (ref 70–99)
GLUCOSE BLDC GLUCOMTR-MCNC: 267 MG/DL — HIGH (ref 70–99)
GLUCOSE BLDC GLUCOMTR-MCNC: 337 MG/DL — HIGH (ref 70–99)

## 2020-03-21 PROCEDURE — 99232 SBSQ HOSP IP/OBS MODERATE 35: CPT

## 2020-03-21 RX ORDER — INSULIN GLARGINE 100 [IU]/ML
28 INJECTION, SOLUTION SUBCUTANEOUS
Refills: 0 | Status: DISCONTINUED | OUTPATIENT
Start: 2020-03-22 | End: 2020-03-23

## 2020-03-21 RX ADMIN — Medication 100 MILLIGRAM(S): at 13:05

## 2020-03-21 RX ADMIN — Medication 3 MILLIGRAM(S): at 20:44

## 2020-03-21 RX ADMIN — INSULIN GLARGINE 26 UNIT(S): 100 INJECTION, SOLUTION SUBCUTANEOUS at 10:27

## 2020-03-21 RX ADMIN — Medication 10 UNIT(S): at 17:36

## 2020-03-21 RX ADMIN — Medication 15 MILLILITER(S): at 13:05

## 2020-03-21 RX ADMIN — Medication 175 MICROGRAM(S): at 06:10

## 2020-03-21 RX ADMIN — Medication 8: at 10:28

## 2020-03-21 RX ADMIN — SIMVASTATIN 20 MILLIGRAM(S): 20 TABLET, FILM COATED ORAL at 22:46

## 2020-03-21 RX ADMIN — Medication 14 UNIT(S): at 10:27

## 2020-03-21 RX ADMIN — Medication 975 MILLIGRAM(S): at 16:56

## 2020-03-21 RX ADMIN — Medication 25 MILLIGRAM(S): at 17:35

## 2020-03-21 RX ADMIN — Medication 6: at 13:03

## 2020-03-21 RX ADMIN — Medication 12 UNIT(S): at 13:04

## 2020-03-21 RX ADMIN — Medication 25 MILLIGRAM(S): at 06:10

## 2020-03-21 RX ADMIN — PRIMIDONE 50 MILLIGRAM(S): 250 TABLET ORAL at 22:46

## 2020-03-21 RX ADMIN — PANTOPRAZOLE SODIUM 40 MILLIGRAM(S): 20 TABLET, DELAYED RELEASE ORAL at 13:05

## 2020-03-21 RX ADMIN — LOSARTAN POTASSIUM 25 MILLIGRAM(S): 100 TABLET, FILM COATED ORAL at 06:11

## 2020-03-21 RX ADMIN — HEPARIN SODIUM 5000 UNIT(S): 5000 INJECTION INTRAVENOUS; SUBCUTANEOUS at 20:43

## 2020-03-21 RX ADMIN — HEPARIN SODIUM 5000 UNIT(S): 5000 INJECTION INTRAVENOUS; SUBCUTANEOUS at 02:54

## 2020-03-21 RX ADMIN — OLANZAPINE 2.5 MILLIGRAM(S): 15 TABLET, FILM COATED ORAL at 17:35

## 2020-03-21 RX ADMIN — OLANZAPINE 2.5 MILLIGRAM(S): 15 TABLET, FILM COATED ORAL at 06:11

## 2020-03-21 RX ADMIN — BUDESONIDE AND FORMOTEROL FUMARATE DIHYDRATE 2 PUFF(S): 160; 4.5 AEROSOL RESPIRATORY (INHALATION) at 06:11

## 2020-03-21 RX ADMIN — Medication 975 MILLIGRAM(S): at 14:19

## 2020-03-21 RX ADMIN — Medication 15 MILLIGRAM(S): at 06:11

## 2020-03-21 RX ADMIN — Medication 975 MILLIGRAM(S): at 20:58

## 2020-03-21 RX ADMIN — Medication 975 MILLIGRAM(S): at 21:52

## 2020-03-21 RX ADMIN — NYSTATIN CREAM 1 APPLICATION(S): 100000 CREAM TOPICAL at 17:41

## 2020-03-21 RX ADMIN — NYSTATIN CREAM 1 APPLICATION(S): 100000 CREAM TOPICAL at 06:14

## 2020-03-21 RX ADMIN — BUDESONIDE AND FORMOTEROL FUMARATE DIHYDRATE 2 PUFF(S): 160; 4.5 AEROSOL RESPIRATORY (INHALATION) at 17:35

## 2020-03-21 RX ADMIN — TRAMADOL HYDROCHLORIDE 25 MILLIGRAM(S): 50 TABLET ORAL at 00:49
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
## 2020-03-21 NOTE — PROGRESS NOTE ADULT - PROBLEM SELECTOR PLAN 2
Likely multifactorial due to hypothyroidism, UTI, with new baseline established  - zyprexa 2.5 BID. As per psych, pt is optimized on this regimen.  - Hypothyroidism possible etiology; , free T4 0.3. Endocrine following. Now on PO 175mcg synthroid qd. IF PATIENT REFUSES, SHE IS TO GET IV  - continue prednisone 15mg.

## 2020-03-21 NOTE — PROGRESS NOTE ADULT - PROBLEM SELECTOR PLAN 10
- DVT ppx: HSQ q8hrs   - Diet: speech soft diet  - Dispo: Patients son refused to pick her up yesterday despite appropriate discharge plan. She is now set up for rehab, to be completed Monday    Transitions of Care Status:  1.  Name of PCP:  Kristie Nails MD (PCP)  2.  PCP Contacted on Admission: [ ] Y    [x] N    3.  PCP contacted at Discharge: [ ] Y    [ ] N    [ ] N/A  4.  Post-Discharge Appointment Date and Location:  5.  Summary of Handoff given to PCP:

## 2020-03-21 NOTE — PROGRESS NOTE ADULT - PROBLEM SELECTOR PLAN 9
- Moderate pulmonary HTN per TTE (1/13)  - Per son, sildenafil was discontinued   - Resp status stable  - Continue to monitor clinically
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
## 2020-03-21 NOTE — PROVIDER CONTACT NOTE (OTHER) - RECOMMENDATIONS
New attempt suggested to day RN.
RN will attempt to get another IV access
Will try to bargain with pt. to take IV synthroid
note refusal
Continue to monitor
DR. East Stated that simivastatin and primadone will not be replaced.
Family educated on taking medications home.
Family educated on taking medications home.
MD will call back for recommendations  due to an emergency call
Provider made aware, Provider recommends to make sure patient received insulin and synthroid, do not force PO Meds
Pt re-educated on importance of nursing care while admitted to hospital
Recheck blood pressure and pulse.
Reinforced education & importance Diabetes w/ risks
Reinforced education & importance of CT scan
Reinforced education & importance of bladder scan & retaining urine
Reinforced education & importance of blood draws.
Reinforced education & importance of medications during hospital stay.
Reinforced education & importance of plan of care
Zyprexa po
administered sliding scale, held pre meal as patient is not eating
educate patient on importance of regularly checking blood glucose level and compliance w/ medication and insulin regimen
give depakote at later time, possibly with 0600 medications
hold medications and have day nurse attempt administration
ok to hold medication
xray to confirm placement

## 2020-03-21 NOTE — PROVIDER CONTACT NOTE (OTHER) - NAME OF MD/NP/PA/DO NOTIFIED:
Dr. Trivedi
Dr. Trivedi
DR. East
Dr Kramer
Dr Kramer
Dr Loving
Dr. Arnett
Dr. Bashir Loving
Dr. Beasley
Dr. Cavanaugh
Dr. Duffy
Dr. Fernandes
Dr. Hopkins
Dr. Kellie Bernal
Dr. Kramer
Dr. Mccormick
Dr. Meza
Dr. Reddy
Dr. Salcedo
MD Benjy
MD Bernal  on unit made aware.
MD Cavanaugh
MD Cavanaugh
MD Duffy
MD Elisha
MD Ferguson
MD Ferguson
MD Lala
MD Malia Thakkar
MD Malia Thakkar
MD Michael Tian
MD Silva
MYLES Romero
Patient refusing vital signs and all 0600 scheduled medications
Tricarick
Tricarick
T[place]arico
Vandana SNELL

## 2020-03-21 NOTE — PROVIDER CONTACT NOTE (OTHER) - ACTION/TREATMENT ORDERED:
MD aware, no new action.
MD said it is ok to not check bedtime fingerstick and not to give scheduled lovenox
no new orders at this time
none
Dr Kramer aware.
MD acknowledged concerns and stated that as long as the pt. is getting insulin as per order there are no further concerns
Md aware. no new action for lovenox. Will consider how to manage labs.
Pt refusing straight cath for urine testing, MD aware no further action at this time
bedside dysphagia screening
ekg, vs, and cariac enzymes done. all negative
no new orders at this time; will continue to monitor
Dr Kramer on the phone talking with patient's daughter.
MD Aware, no further actions at this time.
MD Aware. Will attempt to re-educate patient regarding admission and care. Will continue to monitor.
MD acknowledged that the pt. refused medications and said that he will speak to the day nursing about the pending labs for the patient
MD said that he will speak to the pt.
MD said that it is ok that the patient refused bedtime fingerstick
MD said that it is ok to give morning meds later.
MD Dr Beasley aware of pt. Came to assess pt earlier and notified ok to hold lopressor, primidone and simvastatin due to pt refusing to take medication. VS WDL for pt. Will notify day team in the AM
MD Dr Beasley aware of situation, day team aware of situation as well. Will hold PO medications. Will continue to monitor pt, no new actions to be performed at this time
MD aware. as per MD, all 0600 medications scheduled to be given later during breakfast.
MD said that he will order a one time dose of tramadol
MD said that it is ok to not get the venous blood gas for this set of Q4 labs as long as the pt. is taking the insulin. Md said to wait until the pt. calms down before attempting to place another IV
Med not marked as not done. Passed on to day team due to BP meds including and insulin lantus.
Monitor pt, no change in treatment as of now MD will change Protonix to 1200
Refusal noted
continue to monitor no change in treatment at this time.
Continue to monitor
MD Aware. No further actions at this time.
MD aware. Floor management aware. Will monitor pt closely.
MD aware. NO further interventions noted at this time. WIll monitor pt closely.
MD aware. No further interventions noted at this time. psyc evaluated patient. reinforcing at all times; floor staff involved. Will monitor pt closely.
MD aware. Will monitor pt closely.
MD to place order.
No new orders given
Will recheck vital signs and will continue to monitor.
Will try again with 1 AM gabapentin to offer evening medication. MD
medication held
no interventions at this time
no treatment ordered at this time.
okay to hold pre meal
pt educated on importance of regularly checking blood glucose level and compliance w/ medication and insulin regimen- pt still refuses
stat CXR ordered

## 2020-03-21 NOTE — PROGRESS NOTE ADULT - PROBLEM SELECTOR PLAN 1
She remains combative, and uncooperative, and delusional. Appreciate psychiatry guidance in terms of medication management. She is now on Zyprexa. And optimized from their perspective, no contraindication to discharge.   Patient does not have capacity to refuse essential medications: If patient refusing synthroid or prednisone PO, intravenous formulations should be given. Insulin should be given prior to meals.

## 2020-03-21 NOTE — PROVIDER CONTACT NOTE (OTHER) - SITUATION
Pt. refuses bedtime finger stick and scheduled lovenox
1)Pt refused lovenox because she does not want it   2) refused lab work as ordered.
Patient agitated refused nurses assisted by supervisor into  her room.
Pt's blood sugar @ 22:25 65. After drinking apple juice BS @ 22:56 74
Pt's pre lunch FS = 264. She is refusing insulin and is scheduled for 9 units premeal plus 6 units on the corrective scale
PT refusing blood work testing
Patient arrived from ED w/ 1 to 1 in place. Patient agitated refusing vital signs and nursing care. Patient last set of vitals taken in ED stable.
Patient continue to refuse medicine
Patient is noncompliant; Found patient taking her own medications from personal belongings. Unable to identify which medications was taken.
Patient is noncompliant; PT refusing bladder scan; oral medications; unable to administer her ORAL medication, Unable to do a set of vitals throughout shift; unable to administer Lovenox  injection
Patient is noncompliant; Patients home medications taken away from her; Family taking medications home with them.
Patient is noncompliant; refusing ALL care. PT refused CT scan of the head; department of CT informed.
Patient is noncompliant; refusing ALL care. PT refused Lantus administration; & insulin administration before meals
Patient is noncompliant; refusing ALL care. PT refused phlebotomy to draw blood. Patient refused set of vitals for the entire shift.
Patient is noncompliant; refusing ALL care. PT refusing all standing medications including Lovenox injection for DVT prophylaxis.
Patient is noncompliant; refusing ALL care. PT refusing bladder scan & any straight cath
Patient refused all evening medication despite all efforts fro3 different nurses including the charge nurse
Patient refused all oral medications today
Patient refusing vital signs and all 0600 scheduled medications. patient verbally abusive and making threats.
Patient's blood pressure was 172/110 and pulse of 102/minute.
Pt AMS, refused po metoprolol
Pt had one BM will slight blood tinged with mucous.     Endo called and requested Protonix to be rescheduled for 1200 daily to avoid reaction with Synthroid
Pt has a wound on her abdomen, She is refusing assessment / changing dressing.
Pt is refusing Insulin at this time.  Blood sugar is 247. Pt requires 9 units pre meal and 4 units for sliding scale coverage.  Her daughter is on the phone stating she is the health care proxy
Pt more awake and alert, refusing to take PO medications for the night
Pt refuses AM Fingerstick & insulin
Pt refuses PAS
Pt refuses bedtime meds
Pt refusing EKG and nursing care
Pt refusing PO medications
Pt refusing medication
Pt refusing straight cath for urine testing,
Pt refusing vitals signs and fingerstick as well as night time meds
Pt requesting zyprexa for sleep, pt refusing VS
Pt was lethargic this AM s/p RRT on night shift. Pt becoming more alert. MD aware and will continue to offer food and drinks as pt is more responsive.
Pt. chanted in questionable tongues overnight, RN unable to understand, pt. was very aggressive, spitting and scratching staff. And pt. has been referring to herself as "Rena Rush" overnight
Pt. chanting in questionable tongues RN unable to understand, pt. being very aggressive, spitting and scratching staff. And pt. has been referring to herself as "Rena Rush"
Pt. refused all morning PO medications and RN was unable to get morning labs due to pt being combative and agitated
RN suspecting NG tube dislodgement, 30cc of air injected into NG tube, no sound heard upon auscultation
patient refuses medications/vital signs despite lack of capacity
patient refusing depakote even though there is no capacity to do so
patient too lethargic to take PO medications
pt alert/awake & confused, pulled out kaofeed
pt pulled out NG tube
pt. is requesting tramadol, says that she takes 300mg at home and only got 100mg today, pt. also refusing to take IV meds
pt. refused bedtime fingerstick and does not want any insulin and refused bedtime vitals
pt. refused morning vitals and most of morning meds requesting them later in the am
pt. was being very disruptive, pulled out IV access and RN was unable to retrieve venous blood gas due to pt. not being cooperative
unable to eat - holding pre meal insulin

## 2020-03-21 NOTE — PROVIDER CONTACT NOTE (OTHER) - BACKGROUND
Altered mental status.
This morning she said we can draw labs when her son gets here at noon. He has not been here. She now says only phlebotomy can draw her labs but they are no longer here because they end early on weeken
AMS
AMS and agitated
AMS, borderline DM
AMS, encephalitis
AMS.
Admitted for AMS
Admitted for AMS. PMH of RA, Arthritis, HTN, Diabetes
Admitted with change in mental status
As per daughter it's ok for patient not to have insuline at this time.  Dr Kramer made aware. And he will discuss it with patient's daughter.
DM, hypothyroidism, HTN
Dx: AMS  PMH: diverticulosis, RA, HTN
Dx: AMS  PMH: diverticulosis, RA, high cholesterol, HTN
Patient admitted for altered mental status with h/o pulmonary HTN, DM, COPD, arthritis and hypothyroidism.
Pt admitted for AMS. Resolved
Pt admitted for AMS. Resolved
Pt admitted w/ AMS
Pt admitted with AMS related to hypothyroidism.
Pt admitted with altered mental status
Pt w/history of refusing nursing care/medication.
patient admitted for altered mental status.
pt admitted for AMS
s/p lumbar puncture + MRI w/ anesthesia

## 2020-03-21 NOTE — PROGRESS NOTE ADULT - ASSESSMENT
64F with a history of hypothyroidism, pHTN, IDDM, COPD/CHRIS, RA (on Prednisone), SBO s/p ex lap with lysis of adhesions c/b evisceration presented with encephalopathy and agitation found to be significantly hypothyroid (, without myxedema coma), small (stable) right sided SDH, UTI with hospital course complicated by adverse catatonic reaction to VPA (now resolved) and DKA (resolved) and ongoing psychiatric issues.

## 2020-03-21 NOTE — PROGRESS NOTE ADULT - PROBLEM SELECTOR PLAN 3
#DKA- resolved  - continue with moderate ISS  - lantus 26U qdaily  - 14 units humalog for breakfast.  - 12 units humalog for lunch   - 10 units humalog for dinner.   - endocrine team following.  - IF PATIENT REFUSES INSULIN, SHE IS NOT TO EAT

## 2020-03-22 LAB
GLUCOSE BLDC GLUCOMTR-MCNC: 182 MG/DL — HIGH (ref 70–99)
GLUCOSE BLDC GLUCOMTR-MCNC: 186 MG/DL — HIGH (ref 70–99)
GLUCOSE BLDC GLUCOMTR-MCNC: 198 MG/DL — HIGH (ref 70–99)

## 2020-03-22 PROCEDURE — 99232 SBSQ HOSP IP/OBS MODERATE 35: CPT

## 2020-03-22 RX ADMIN — Medication 2: at 12:29

## 2020-03-22 RX ADMIN — Medication 2: at 08:43

## 2020-03-22 RX ADMIN — Medication 975 MILLIGRAM(S): at 11:31

## 2020-03-22 RX ADMIN — NYSTATIN CREAM 1 APPLICATION(S): 100000 CREAM TOPICAL at 17:45

## 2020-03-22 RX ADMIN — Medication 3 MILLIGRAM(S): at 22:27

## 2020-03-22 RX ADMIN — BUDESONIDE AND FORMOTEROL FUMARATE DIHYDRATE 2 PUFF(S): 160; 4.5 AEROSOL RESPIRATORY (INHALATION) at 17:45

## 2020-03-22 RX ADMIN — SIMVASTATIN 20 MILLIGRAM(S): 20 TABLET, FILM COATED ORAL at 22:27

## 2020-03-22 RX ADMIN — BUDESONIDE AND FORMOTEROL FUMARATE DIHYDRATE 2 PUFF(S): 160; 4.5 AEROSOL RESPIRATORY (INHALATION) at 07:16

## 2020-03-22 RX ADMIN — Medication 100 MILLIGRAM(S): at 10:37

## 2020-03-22 RX ADMIN — PRIMIDONE 50 MILLIGRAM(S): 250 TABLET ORAL at 22:27

## 2020-03-22 RX ADMIN — Medication 975 MILLIGRAM(S): at 10:37

## 2020-03-22 RX ADMIN — Medication 12 UNIT(S): at 12:29

## 2020-03-22 RX ADMIN — Medication 15 MILLIGRAM(S): at 07:16

## 2020-03-22 RX ADMIN — Medication 15 MILLILITER(S): at 10:37

## 2020-03-22 RX ADMIN — OLANZAPINE 2.5 MILLIGRAM(S): 15 TABLET, FILM COATED ORAL at 17:42

## 2020-03-22 RX ADMIN — Medication 175 MICROGRAM(S): at 07:16

## 2020-03-22 RX ADMIN — HEPARIN SODIUM 5000 UNIT(S): 5000 INJECTION INTRAVENOUS; SUBCUTANEOUS at 22:26

## 2020-03-22 RX ADMIN — Medication 975 MILLIGRAM(S): at 22:27

## 2020-03-22 RX ADMIN — Medication 10 UNIT(S): at 17:43

## 2020-03-22 RX ADMIN — LOSARTAN POTASSIUM 25 MILLIGRAM(S): 100 TABLET, FILM COATED ORAL at 07:16

## 2020-03-22 RX ADMIN — NYSTATIN CREAM 1 APPLICATION(S): 100000 CREAM TOPICAL at 08:55

## 2020-03-22 RX ADMIN — HEPARIN SODIUM 5000 UNIT(S): 5000 INJECTION INTRAVENOUS; SUBCUTANEOUS at 07:21

## 2020-03-22 RX ADMIN — INSULIN GLARGINE 28 UNIT(S): 100 INJECTION, SOLUTION SUBCUTANEOUS at 08:44

## 2020-03-22 RX ADMIN — Medication 2: at 17:42

## 2020-03-22 RX ADMIN — Medication 975 MILLIGRAM(S): at 23:20

## 2020-03-22 RX ADMIN — Medication 25 MILLIGRAM(S): at 07:16

## 2020-03-22 RX ADMIN — PANTOPRAZOLE SODIUM 40 MILLIGRAM(S): 20 TABLET, DELAYED RELEASE ORAL at 10:37

## 2020-03-22 RX ADMIN — Medication 25 MILLIGRAM(S): at 17:42

## 2020-03-22 RX ADMIN — Medication 14 UNIT(S): at 08:43

## 2020-03-22 RX ADMIN — OLANZAPINE 2.5 MILLIGRAM(S): 15 TABLET, FILM COATED ORAL at 07:16

## 2020-03-22 NOTE — PROGRESS NOTE ADULT - PROBLEM SELECTOR PLAN 2
Likely multifactorial due to hypothyroidism, UTI, with new baseline established  - Zyprexa 2.5 BID, as above  - Hypothyroidism possible etiology; , free T4 0.3. Endocrine following. Now on PO 175mcg synthroid qd. IF PATIENT REFUSES, SHE IS TO GET IV

## 2020-03-22 NOTE — PROGRESS NOTE ADULT - NSHPATTENDINGPLANDISCUSS_GEN_ALL_CORE
medicine resident
Medicine Intern Glenn
Dr. Babb, resident
Patient, her two sons, intern, asst nurse mgr, RN and functional RN
Medicine ACP
Medicine ACP
Team B, case management
Team B
Team B, case management
Team B
Team B
Norris at bedside
Team B
Team B, case management
Team B
ana maría Pisano via phone
Team B
Team B
ana maría Pisano over phone
Team B
Team B

## 2020-03-22 NOTE — CHART NOTE - NSCHARTNOTEFT_GEN_A_CORE
Reviewed BG data from past 48 hours. Glucose values improved now w/consistent FS testing and insulin administration.     T2DM: (plan)  -test BG AC/HS  -c/w Lantus 28 units QAM  -c/w Humalog 14-12-10 w/meals  --continue humalog mod dose sliding scale ac meals and hs  Discharge plan: Lantus 25 units sq qam, metformin 500mg po bid and januvia 100mg po qdaily. Her behavioral issues and reliance on her son for insulin makes 4 shots/day impractical.     Hypothyroid: (plan)  -Continue LT4 175 mcg po daily.  -recheck TFTs as outpt    Call w/any questions  pager: [phone]  office; [phone]  cell: [phone]    Due to [facility] policy during the evolving novel coronavirus outbreak, efforts are being made to limit unnecessary patient contacts to limit the spread of disease.   Accordingly, patients without clear indication for physical exam or face to face interview from the Endocrine team will be adjusted in conjunction with conversations with primary provider team. This is being done for the safety of all the patients we care for.

## 2020-03-22 NOTE — PROGRESS NOTE ADULT - SUBJECTIVE AND OBJECTIVE BOX
[facility] Division of Hospital Medicine  Zara Herrmann MD  Pager (DEACON-F, 1S-1C): [phone]  Other Times:  [phone]    Patient is a 64y old  Female who presents with a chief complaint of change in mental status (20 Mar 2020 08:52)      SUBJECTIVE / OVERNIGHT EVENTS: No acute events, history limited due to mental status. Again refuses to acknowledge I am her doctor.  No complaints.   Wants to be discharged  ADDITIONAL REVIEW OF SYSTEMS:   No CP, SOB, N/V/D    MEDICATIONS  (STANDING):  budesonide  80 MICROgram(s)/formoterol 4.5 MICROgram(s) Inhaler 2 Puff(s) Inhalation two times a day  dextrose 5%. 1000 milliLiter(s) (50 mL/Hr) IV Continuous <Continuous>  dextrose 50% Injectable 12.5 Gram(s) IV Push once  dextrose 50% Injectable 25 Gram(s) IV Push once  dextrose 50% Injectable 25 Gram(s) IV Push once  heparin  Injectable 5000 Unit(s) SubCutaneous every 8 hours  influenza   Vaccine 0.5 milliLiter(s) IntraMuscular once  insulin lispro (HumaLOG) corrective regimen sliding scale   SubCutaneous three times a day before meals  insulin lispro (HumaLOG) corrective regimen sliding scale   SubCutaneous at bedtime  insulin lispro Injectable (HumaLOG) 10 Unit(s) SubCutaneous before dinner  insulin lispro Injectable (HumaLOG) 14 Unit(s) SubCutaneous before breakfast  insulin lispro Injectable (HumaLOG) 12 Unit(s) SubCutaneous before lunch  levothyroxine 175 MICROGram(s) Oral daily  losartan 25 milliGRAM(s) Oral daily  metoprolol tartrate 25 milliGRAM(s) Oral two times a day  multivitamin/minerals/iron Oral Solution (CENTRUM) 15 milliLiter(s) Oral daily  nystatin Powder 1 Application(s) Topical every 12 hours  OLANZapine 2.5 milliGRAM(s) Oral two times a day  pantoprazole    Tablet 40 milliGRAM(s) Oral <User Schedule>  predniSONE   Tablet 15 milliGRAM(s) Oral daily  primidone 50 milliGRAM(s) Oral at bedtime  simvastatin 20 milliGRAM(s) Oral at bedtime  thiamine 100 milliGRAM(s) Oral daily    MEDICATIONS  (PRN):  acetaminophen   Tablet .. 975 milliGRAM(s) Oral every 6 hours PRN Moderate Pain (4 - 6), Severe Pain (7 - 10)  acetaminophen  Suppository .. 650 milliGRAM(s) Rectal every 6 hours PRN Temp greater or equal to 38C (100.4F)  albuterol/ipratropium for Nebulization 3 milliLiter(s) Nebulizer every 6 hours PRN Shortness of Breath and/or Wheezing  dextrose 40% Gel 15 Gram(s) Oral once PRN Blood Glucose LESS THAN 70 milliGRAM(s)/deciLiter  glucagon  Injectable 1 milliGRAM(s) IntraMuscular once PRN Glucose <70 milliGRAM(s)/deciLiter  melatonin 3 milliGRAM(s) Oral at bedtime PRN Insomnia    CAPILLARY BLOOD GLUCOSE      POCT Blood Glucose.: 150 mg/dL (21 Mar 2020 17:15)  POCT Blood Glucose.: 267 mg/dL (21 Mar 2020 12:39)  POCT Blood Glucose.: 337 mg/dL (21 Mar 2020 10:19)    I&O's Summary    20 Mar 2020 07:01  -  21 Mar 2020 07:00  --------------------------------------------------------  IN: 480 mL / OUT: 0 mL / NET: 480 mL        PHYSICAL EXAM:  Vital Signs Last 24 Hrs  T(C): 36.8 (21 Mar 2020 12:05), Max: 36.8 (21 Mar 2020 12:05)  T(F): 98.3 (21 Mar 2020 12:05), Max: 98.3 (21 Mar 2020 12:05)  HR: 97 (21 Mar 2020 13:20) (97 - 114)  BP: 103/71 (21 Mar 2020 12:05) (103/71 - 139/83)  BP(mean): --  RR: 18 (21 Mar 2020 12:05) (18 - 18)  SpO2: 96% (21 Mar 2020 12:05) (96% - 98%)    PATIENT Refused exam  GENERAL: elderly, obese woman  CHEST/LUNG: Deferred  HEART: Patient refused  ABDOMEN: Patient refused  EXTREMITIES:  Patient refused  PSYCH: AAOx3, inappropriate mood and affect, delusional  NEUROLOGY: moving all 4 extremities equally  SKIN: Patient refused  LABS:                      RADIOLOGY & ADDITIONAL TESTS:  Results Reviewed:     Imaging Personally Reviewed:    ECG Personally Reviewed:      COORDINATION OF CARE:  Care Discussed with Consultants/Other Providers [Y/N]: Medicine ACP  Prior or Outpatient Records Reviewed [Y/N]:

## 2020-03-22 NOTE — PROGRESS NOTE ADULT - PROBLEM SELECTOR PLAN 1
- She is optimized from psychiatric perspective and stable for discharge. Will continue Zyprexa BID. No psychiatric contraindication to discharge.   - Patient does not have capacity to refuse essential medications: If patient refusing synthroid or prednisone PO, intravenous formulations should be given. Insulin should be given prior to meals.

## 2020-03-22 NOTE — PROGRESS NOTE ADULT - PROBLEM SELECTOR PLAN 3
#DKA- resolved  - continue with moderate ISS  - lantus 26U qdaily  - 14 units humalog for breakfast.  - 12 units humalog for lunch   - 10 units humalog for dinner.   - endocrine team following.  - Discharge plan per endocrine  - If patient refuses insulin, not to be fed

## 2020-03-23 LAB
GLUCOSE BLDC GLUCOMTR-MCNC: 217 MG/DL — HIGH (ref 70–99)
GLUCOSE BLDC GLUCOMTR-MCNC: 218 MG/DL — HIGH (ref 70–99)
GLUCOSE BLDC GLUCOMTR-MCNC: 283 MG/DL — HIGH (ref 70–99)

## 2020-03-23 PROCEDURE — 99233 SBSQ HOSP IP/OBS HIGH 50: CPT

## 2020-03-23 RX ORDER — LEVOTHYROXINE SODIUM 125 MCG
150 TABLET ORAL DAILY
Refills: 0 | Status: DISCONTINUED | OUTPATIENT
Start: 2020-03-23 | End: 2020-03-27

## 2020-03-23 RX ORDER — ONDANSETRON 8 MG/1
4 TABLET, FILM COATED ORAL EVERY 6 HOURS
Refills: 0 | Status: DISCONTINUED | OUTPATIENT
Start: 2020-03-23 | End: 2020-03-23

## 2020-03-23 RX ORDER — INSULIN GLARGINE 100 [IU]/ML
28 INJECTION, SOLUTION SUBCUTANEOUS
Refills: 0 | Status: DISCONTINUED | OUTPATIENT
Start: 2020-03-23 | End: 2020-03-23

## 2020-03-23 RX ORDER — INSULIN GLARGINE 100 [IU]/ML
30 INJECTION, SOLUTION SUBCUTANEOUS AT BEDTIME
Refills: 0 | Status: DISCONTINUED | OUTPATIENT
Start: 2020-03-23 | End: 2020-03-24

## 2020-03-23 RX ORDER — INSULIN LISPRO 100/ML
14 VIAL (ML) SUBCUTANEOUS
Refills: 0 | Status: DISCONTINUED | OUTPATIENT
Start: 2020-03-23 | End: 2020-03-27

## 2020-03-23 RX ORDER — INSULIN LISPRO 100/ML
16 VIAL (ML) SUBCUTANEOUS
Refills: 0 | Status: DISCONTINUED | OUTPATIENT
Start: 2020-03-23 | End: 2020-03-26

## 2020-03-23 RX ADMIN — SIMVASTATIN 20 MILLIGRAM(S): 20 TABLET, FILM COATED ORAL at 22:17

## 2020-03-23 RX ADMIN — INSULIN GLARGINE 28 UNIT(S): 100 INJECTION, SOLUTION SUBCUTANEOUS at 08:53

## 2020-03-23 RX ADMIN — Medication 10 UNIT(S): at 17:19

## 2020-03-23 RX ADMIN — OLANZAPINE 2.5 MILLIGRAM(S): 15 TABLET, FILM COATED ORAL at 17:20

## 2020-03-23 RX ADMIN — Medication 3 MILLIGRAM(S): at 22:18

## 2020-03-23 RX ADMIN — BUDESONIDE AND FORMOTEROL FUMARATE DIHYDRATE 2 PUFF(S): 160; 4.5 AEROSOL RESPIRATORY (INHALATION) at 07:04

## 2020-03-23 RX ADMIN — NYSTATIN CREAM 1 APPLICATION(S): 100000 CREAM TOPICAL at 07:04

## 2020-03-23 RX ADMIN — OLANZAPINE 2.5 MILLIGRAM(S): 15 TABLET, FILM COATED ORAL at 07:03

## 2020-03-23 RX ADMIN — PRIMIDONE 50 MILLIGRAM(S): 250 TABLET ORAL at 22:17

## 2020-03-23 RX ADMIN — Medication 25 MILLIGRAM(S): at 17:20

## 2020-03-23 RX ADMIN — BUDESONIDE AND FORMOTEROL FUMARATE DIHYDRATE 2 PUFF(S): 160; 4.5 AEROSOL RESPIRATORY (INHALATION) at 17:20

## 2020-03-23 RX ADMIN — Medication 14 UNIT(S): at 08:53

## 2020-03-23 RX ADMIN — Medication 175 MICROGRAM(S): at 07:04

## 2020-03-23 RX ADMIN — Medication 15 MILLIGRAM(S): at 07:04

## 2020-03-23 RX ADMIN — Medication 4: at 12:27

## 2020-03-23 RX ADMIN — Medication 975 MILLIGRAM(S): at 23:09

## 2020-03-23 RX ADMIN — Medication 975 MILLIGRAM(S): at 22:17

## 2020-03-23 RX ADMIN — Medication 4: at 17:19

## 2020-03-23 RX ADMIN — HEPARIN SODIUM 5000 UNIT(S): 5000 INJECTION INTRAVENOUS; SUBCUTANEOUS at 22:17

## 2020-03-23 RX ADMIN — Medication 12 UNIT(S): at 12:26

## 2020-03-23 RX ADMIN — Medication 100 MILLIGRAM(S): at 10:59

## 2020-03-23 RX ADMIN — NYSTATIN CREAM 1 APPLICATION(S): 100000 CREAM TOPICAL at 17:20

## 2020-03-23 RX ADMIN — PANTOPRAZOLE SODIUM 40 MILLIGRAM(S): 20 TABLET, DELAYED RELEASE ORAL at 10:59

## 2020-03-23 RX ADMIN — HEPARIN SODIUM 5000 UNIT(S): 5000 INJECTION INTRAVENOUS; SUBCUTANEOUS at 07:05

## 2020-03-23 RX ADMIN — Medication 25 MILLIGRAM(S): at 07:04

## 2020-03-23 RX ADMIN — LOSARTAN POTASSIUM 25 MILLIGRAM(S): 100 TABLET, FILM COATED ORAL at 07:04

## 2020-03-23 RX ADMIN — Medication 6: at 08:53

## 2020-03-23 RX ADMIN — Medication 15 MILLILITER(S): at 10:59

## 2020-03-23 NOTE — PROGRESS NOTE ADULT - PROBLEM SELECTOR PLAN 10
- DVT ppx: HSQ q8hrs   - Diet: speech soft diet  - Dispo: Patients son refused to pick her up despite appropriate discharge plan. She is now set up for rehab, to be completed Monday, pending auth    Transitions of Care Status:  1.  Name of PCP:  Kristie Nails MD (PCP)  2.  PCP Contacted on Admission: [ ] Y    [x] N    3.  PCP contacted at Discharge: [ ] Y    [ ] N    [ ] N/A  4.  Post-Discharge Appointment Date and Location:  5.  Summary of Handoff given to PCP:

## 2020-03-23 NOTE — CHART NOTE - NSCHARTNOTEFT_GEN_A_CORE
Endocrine Chart note:  Reviewed pts chart/labs/meds and spoke to staff and primary team. Pt remains with AMS on and off, disoriented and gets easily confused. Per RN eating 100% of meals with BG levels in high 100s to 200s. No hypoglycemia and taking Prednisone/Synthroid more consistently.  Noted Free T4 elevated.     MEDICATIONS  (STANDING):  insulin glargine Injectable (LANTUS) 28 Unit(s) SubCutaneous <User Schedule>  insulin lispro (HumaLOG) corrective regimen sliding scale   SubCutaneous three times a day before meals  insulin lispro (HumaLOG) corrective regimen sliding scale   SubCutaneous at bedtime  insulin lispro Injectable (HumaLOG) 10 Unit(s) SubCutaneous before dinner  insulin lispro Injectable (HumaLOG) 14 Unit(s) SubCutaneous before breakfast  insulin lispro Injectable (HumaLOG) 12 Unit(s) SubCutaneous before lunch  levothyroxine 175 MICROGram(s) Oral daily  predniSONE   Tablet 15 milliGRAM(s) Oral daily  simvastatin 20 milliGRAM(s) Oral at bedtime      POCT Blood Glucose.: 218 mg/dL (03-23-20 @ 12:23)  POCT Blood Glucose.: 283 mg/dL (03-23-20 @ 08:50)  POCT Blood Glucose.: 198 mg/dL (03-22-20 @ 17:40)  POCT Blood Glucose.: 182 mg/dL (03-22-20 @ 12:10)  POCT Blood Glucose.: 186 mg/dL (03-22-20 @ 08:36)  POCT Blood Glucose.: 150 mg/dL (03-21-20 @ 17:15)    Plan:  -Test BG ac and hs  -Increase Lantus dose to 30 units  q am  -Change Humalog to 16-14-10 ac meals  -C/w Humalog moderate correction scales ac and hs  -Discussed Total T3 and Free T4 levels with Dr Jules. Recommending to repeat Free T4 tomorrow morning   -Discharge plan is basal/bolus regimen. Pt likely going to rehab.  -Plan discussed with pt's team and staff.    Due to [facility] policy during the evolving novel coronavirus outbreak, efforts are being made to limit unnecessary patient contact to limit the spread of the disease.  Accordingly, patients without clear indication for physical exam or face to face interview from the endocrine team insulin regimen will be adjusted in conjunction with conversation with primary provider team, This is being done for the safety of all the patients we care for.    For any questions please contact team:  Cell: [phone] today 3/23/20  Office: [phone]. Endocrine Chart note:  Reviewed pts chart/labs/meds and spoke to staff and primary team. Pt remains with AMS on and off, disoriented and gets easily confused. Per RN eating 100% of meals with BG levels in high 100s to 200s. No hypoglycemia and taking Prednisone/Synthroid more consistently.  Noted Free T4 elevated.     MEDICATIONS  (STANDING):  insulin glargine Injectable (LANTUS) 28 Unit(s) SubCutaneous <User Schedule>  insulin lispro (HumaLOG) corrective regimen sliding scale   SubCutaneous three times a day before meals  insulin lispro (HumaLOG) corrective regimen sliding scale   SubCutaneous at bedtime  insulin lispro Injectable (HumaLOG) 10 Unit(s) SubCutaneous before dinner  insulin lispro Injectable (HumaLOG) 14 Unit(s) SubCutaneous before breakfast  insulin lispro Injectable (HumaLOG) 12 Unit(s) SubCutaneous before lunch  levothyroxine 175 MICROGram(s) Oral daily  predniSONE   Tablet 15 milliGRAM(s) Oral daily  simvastatin 20 milliGRAM(s) Oral at bedtime      POCT Blood Glucose.: 218 mg/dL (03-23-20 @ 12:23)  POCT Blood Glucose.: 283 mg/dL (03-23-20 @ 08:50)  POCT Blood Glucose.: 198 mg/dL (03-22-20 @ 17:40)  POCT Blood Glucose.: 182 mg/dL (03-22-20 @ 12:10)  POCT Blood Glucose.: 186 mg/dL (03-22-20 @ 08:36)  POCT Blood Glucose.: 150 mg/dL (03-21-20 @ 17:15)    Free Thyroxine, Serum: 2.3 ng/dL (03-20-20 @ 11:43)  Free Thyroxine, Serum: 2.3 ng/dL (03-19-20 @ 13:39)      Plan:  -Test BG ac and hs  -Increase Lantus dose to 30 units  q am  -Change Humalog to 16-14-10 ac meals  -C/w Humalog moderate correction scales ac and hs  -Discussed Total T3 and Free T4 levels with Dr Jules. Recommending to repeat Free T4 tomorrow morning   -Discharge plan is basal/bolus regimen. Pt likely going to rehab.  -Plan discussed with pt's team and staff.    Due to [facility] policy during the evolving novel coronavirus outbreak, efforts are being made to limit unnecessary patient contact to limit the spread of the disease.  Accordingly, patients without clear indication for physical exam or face to face interview from the endocrine team insulin regimen will be adjusted in conjunction with conversation with primary provider team, This is being done for the safety of all the patients we care for.    For any questions please contact team:  Cell: [phone] today 3/23/20  Office: [phone]. Endocrine Chart note:  Reviewed pts chart/labs/meds and spoke to staff and primary team. Pt remains with AMS on and off, disoriented and gets easily confused. Per RN eating 100% of meals with BG levels in high 100s to 200s. No hypoglycemia and taking Prednisone/Synthroid more consistently.  Noted Free T4 elevated.     MEDICATIONS  (STANDING):  insulin glargine Injectable (LANTUS) 28 Unit(s) SubCutaneous <User Schedule>  insulin lispro (HumaLOG) corrective regimen sliding scale   SubCutaneous three times a day before meals  insulin lispro (HumaLOG) corrective regimen sliding scale   SubCutaneous at bedtime  insulin lispro Injectable (HumaLOG) 10 Unit(s) SubCutaneous before dinner  insulin lispro Injectable (HumaLOG) 14 Unit(s) SubCutaneous before breakfast  insulin lispro Injectable (HumaLOG) 12 Unit(s) SubCutaneous before lunch  levothyroxine 175 MICROGram(s) Oral daily  predniSONE   Tablet 15 milliGRAM(s) Oral daily  simvastatin 20 milliGRAM(s) Oral at bedtime      POCT Blood Glucose.: 218 mg/dL (03-23-20 @ 12:23)  POCT Blood Glucose.: 283 mg/dL (03-23-20 @ 08:50)  POCT Blood Glucose.: 198 mg/dL (03-22-20 @ 17:40)  POCT Blood Glucose.: 182 mg/dL (03-22-20 @ 12:10)  POCT Blood Glucose.: 186 mg/dL (03-22-20 @ 08:36)  POCT Blood Glucose.: 150 mg/dL (03-21-20 @ 17:15)    Free Thyroxine, Serum: 2.3 ng/dL (03-20-20 @ 11:43)  Free Thyroxine, Serum: 2.3 ng/dL (03-19-20 @ 13:39)      Plan:  -Test BG ac and hs  -Increase Lantus dose to 30 units  q am  -Change Humalog to 16-14-10 ac meals  -C/w Humalog moderate correction scales ac and hs  -Discussed Total T3 and Free T4 levels with Dr Jules. T4 low x 2 days will decrease Levothyroxine to 150mcg daily and recheck Free T4 in 1 week 3/30/20   -Discharge plan is basal/bolus regimen. Pt likely going to rehab.  -Plan discussed with pt's team and staff.    Due to [facility] policy during the evolving novel coronavirus outbreak, efforts are being made to limit unnecessary patient contact to limit the spread of the disease.  Accordingly, patients without clear indication for physical exam or face to face interview from the endocrine team insulin regimen will be adjusted in conjunction with conversation with primary provider team, This is being done for the safety of all the patients we care for.    For any questions please contact team:  Cell: [phone] today 3/23/20  Office: [phone].

## 2020-03-23 NOTE — CHART NOTE - NSCHARTNOTEFT_GEN_A_CORE
Nutrition follow up     Pt seen for malnutrition follow up     Hospital course as per chart: Pt 65 y/o F with PMH: arthritis, hypothyroidism, pulmonary HTN, DM on insulin, HTN, COPD/CHRIS, RA on chronic prednisone, chronic tremors, SBO S/P ex lap with lysis of adhesions c/b evisceration, admitted with agitation x 3 weeks of unclear etiology, found to be significantly hypothyroid, MRI brain found to have small right sided SDH, hospital course c/b catatonia improved with Ativan, DKA; S/P swallow evaluation (02/27) recommending soft texture; pending optimization of psychiatric regimen.    Source: Patient [ ]    Family [ ]     other [x]; Medical record, RN    Pt confused as per documentation - refused interview, states "leave my room now". As per RN, pt with very good appetite and PO intake, consuming 100% of meals. RN denies pt with difficulty chewing/swallowing soft diet. Reports pt complains of abdominal pain and nausea - provider aware. Denies pt with vomiting, diarrhea, or constipation, last BM yesterday (03/22).     Diet: Soft + Consistent Carbohydrate     Enteral /Parenteral Nutrition: n/a    Current Weight: (01/29) 205.6 pounds -> (02/12) 206.3 pounds -> (02/26) 198.1 pounds -> (03/18) 197.5 pounds -?accuracy of weight fluctuations likely due to fluid shifts as pt with edema, will continue to monitor.   % Weight Change: n/a    Pertinent Medications: MEDICATIONS  (STANDING):  budesonide  80 MICROgram(s)/formoterol 4.5 MICROgram(s) Inhaler 2 Puff(s) Inhalation two times a day  dextrose 5%. 1000 milliLiter(s) (50 mL/Hr) IV Continuous <Continuous>  dextrose 50% Injectable 12.5 Gram(s) IV Push once  dextrose 50% Injectable 25 Gram(s) IV Push once  dextrose 50% Injectable 25 Gram(s) IV Push once  heparin  Injectable 5000 Unit(s) SubCutaneous every 8 hours  influenza   Vaccine 0.5 milliLiter(s) IntraMuscular once  insulin glargine Injectable (LANTUS) 28 Unit(s) SubCutaneous <User Schedule>  insulin lispro (HumaLOG) corrective regimen sliding scale   SubCutaneous three times a day before meals  insulin lispro (HumaLOG) corrective regimen sliding scale   SubCutaneous at bedtime  insulin lispro Injectable (HumaLOG) 10 Unit(s) SubCutaneous before dinner  insulin lispro Injectable (HumaLOG) 14 Unit(s) SubCutaneous before breakfast  insulin lispro Injectable (HumaLOG) 12 Unit(s) SubCutaneous before lunch  levothyroxine 175 MICROGram(s) Oral daily  losartan 25 milliGRAM(s) Oral daily  metoprolol tartrate 25 milliGRAM(s) Oral two times a day  multivitamin/minerals/iron Oral Solution (CENTRUM) 15 milliLiter(s) Oral daily  nystatin Powder 1 Application(s) Topical every 12 hours  OLANZapine 2.5 milliGRAM(s) Oral two times a day  pantoprazole    Tablet 40 milliGRAM(s) Oral <User Schedule>  predniSONE   Tablet 15 milliGRAM(s) Oral daily  primidone 50 milliGRAM(s) Oral at bedtime  simvastatin 20 milliGRAM(s) Oral at bedtime  thiamine 100 milliGRAM(s) Oral daily    MEDICATIONS  (PRN):  acetaminophen   Tablet .. 975 milliGRAM(s) Oral every 6 hours PRN Moderate Pain (4 - 6), Severe Pain (7 - 10)  acetaminophen  Suppository .. 650 milliGRAM(s) Rectal every 6 hours PRN Temp greater or equal to 38C (100.4F)  albuterol/ipratropium for Nebulization 3 milliLiter(s) Nebulizer every 6 hours PRN Shortness of Breath and/or Wheezing  dextrose 40% Gel 15 Gram(s) Oral once PRN Blood Glucose LESS THAN 70 milliGRAM(s)/deciLiter  glucagon  Injectable 1 milliGRAM(s) IntraMuscular once PRN Glucose <70 milliGRAM(s)/deciLiter  melatonin 3 milliGRAM(s) Oral at bedtime PRN Insomnia    Pertinent Labs: last obtained (03/18) BUN 25,   Finger sticks: (03/23) 283 (03/22) 182 - 198   (01/28) HbA1c 9.0%     Skin: no noted pressure injuries as per documentation.   Noted +1 generalized edema as per flow sheets (previously +2 generalized and +3 naga. ankle and foot edema).     Estimated Needs:   [x] no change since previous assessment  [ ] recalculated:     Previous Nutrition Diagnoses:   [x] Malnutrition; moderate - pt now with good appetite and PO intake.   [x] Altered Nutrition Related Lab Values - being addressed with Consistent Carbohydrate diet; pt refusing interview/education.    New Nutrition Diagnosis: [x] not applicable    Interventions:     1. Recommend continue soft + Consistent Carbohydrate diet. Defer diet/fluid consistencies to medical team/SLP recommendations. Will continue to monitor and adjust as needed.  2. Continue Multivitamin/minerals/iron to optimize nutrient intake.  3. Provide nutrition therapy education as needed/requested.   4. Continue to obtain weights to identify changes if any.     Monitoring and Evaluation:     [x] PO intake [x] Tolerance to diet prescription [x] weights [x] follow up per protocol    [x] other: if pt amenable for nutrition therapy education     RD remains available.  Aleah Lopez MS RDN CDN #[phone]. Nutrition follow up     Pt seen for malnutrition follow up     Hospital course as per chart: Pt 65 y/o F with PMH: arthritis, hypothyroidism, pulmonary HTN, DM on insulin, HTN, COPD/CHRIS, RA on chronic prednisone, chronic tremors, SBO S/P ex lap with lysis of adhesions c/b evisceration, admitted with agitation x 3 weeks of unclear etiology, found to be significantly hypothyroid, MRI brain found to have small right sided SDH, hospital course c/b catatonia improved with Ativan, DKA; S/P swallow evaluation (02/27) recommending soft texture; pending optimization of psychiatric regimen.    Source: Patient [ ]    Family [ ]     other [x]; Medical record, RN    Pt confused as per documentation - refused interview, states "leave my room now". As per RN, pt with very good appetite and PO intake, consuming 100% of meals and ordering two trays. RN denies pt with difficulty chewing/swallowing soft diet. Reports pt complains of abdominal pain and nausea - provider aware. Denies pt with vomiting, diarrhea, or constipation, last BM yesterday (03/22).     Diet: Soft + Consistent Carbohydrate     Enteral /Parenteral Nutrition: n/a    Current Weight: (01/29) 205.6 pounds -> (02/12) 206.3 pounds -> (02/26) 198.1 pounds -> (03/18) 197.5 pounds -?accuracy of weight fluctuations likely due to fluid shifts as pt with edema, will continue to monitor.   % Weight Change: n/a    Pertinent Medications: MEDICATIONS  (STANDING):  budesonide  80 MICROgram(s)/formoterol 4.5 MICROgram(s) Inhaler 2 Puff(s) Inhalation two times a day  dextrose 5%. 1000 milliLiter(s) (50 mL/Hr) IV Continuous <Continuous>  dextrose 50% Injectable 12.5 Gram(s) IV Push once  dextrose 50% Injectable 25 Gram(s) IV Push once  dextrose 50% Injectable 25 Gram(s) IV Push once  heparin  Injectable 5000 Unit(s) SubCutaneous every 8 hours  influenza   Vaccine 0.5 milliLiter(s) IntraMuscular once  insulin glargine Injectable (LANTUS) 28 Unit(s) SubCutaneous <User Schedule>  insulin lispro (HumaLOG) corrective regimen sliding scale   SubCutaneous three times a day before meals  insulin lispro (HumaLOG) corrective regimen sliding scale   SubCutaneous at bedtime  insulin lispro Injectable (HumaLOG) 10 Unit(s) SubCutaneous before dinner  insulin lispro Injectable (HumaLOG) 14 Unit(s) SubCutaneous before breakfast  insulin lispro Injectable (HumaLOG) 12 Unit(s) SubCutaneous before lunch  levothyroxine 175 MICROGram(s) Oral daily  losartan 25 milliGRAM(s) Oral daily  metoprolol tartrate 25 milliGRAM(s) Oral two times a day  multivitamin/minerals/iron Oral Solution (CENTRUM) 15 milliLiter(s) Oral daily  nystatin Powder 1 Application(s) Topical every 12 hours  OLANZapine 2.5 milliGRAM(s) Oral two times a day  pantoprazole    Tablet 40 milliGRAM(s) Oral <User Schedule>  predniSONE   Tablet 15 milliGRAM(s) Oral daily  primidone 50 milliGRAM(s) Oral at bedtime  simvastatin 20 milliGRAM(s) Oral at bedtime  thiamine 100 milliGRAM(s) Oral daily    MEDICATIONS  (PRN):  acetaminophen   Tablet .. 975 milliGRAM(s) Oral every 6 hours PRN Moderate Pain (4 - 6), Severe Pain (7 - 10)  acetaminophen  Suppository .. 650 milliGRAM(s) Rectal every 6 hours PRN Temp greater or equal to 38C (100.4F)  albuterol/ipratropium for Nebulization 3 milliLiter(s) Nebulizer every 6 hours PRN Shortness of Breath and/or Wheezing  dextrose 40% Gel 15 Gram(s) Oral once PRN Blood Glucose LESS THAN 70 milliGRAM(s)/deciLiter  glucagon  Injectable 1 milliGRAM(s) IntraMuscular once PRN Glucose <70 milliGRAM(s)/deciLiter  melatonin 3 milliGRAM(s) Oral at bedtime PRN Insomnia    Pertinent Labs: last obtained (03/18) BUN 25,   Finger sticks: (03/23) 283 (03/22) 182 - 198   (01/28) HbA1c 9.0%     Skin: no noted pressure injuries as per documentation.   Noted +1 generalized edema as per flow sheets (previously +2 generalized and +3 naga. ankle and foot edema).     Estimated Needs:   [x] no change since previous assessment  [ ] recalculated:     Previous Nutrition Diagnoses:   [x] Malnutrition; moderate - pt now with good appetite and PO intake.   [x] Altered Nutrition Related Lab Values - being addressed with Consistent Carbohydrate diet; pt refusing interview/education.    New Nutrition Diagnosis: [x] not applicable    Interventions:     1. Recommend continue soft + Consistent Carbohydrate diet. Defer diet/fluid consistencies to medical team/SLP recommendations. Will continue to monitor and adjust as needed.  2. Continue Multivitamin/minerals/iron to optimize nutrient intake.  3. Provide nutrition therapy education as needed/requested.   4. Continue to obtain weights to identify changes if any.     Monitoring and Evaluation:     [x] PO intake [x] Tolerance to diet prescription [x] weights [x] follow up per protocol    [x] other: nutrition therapy education     RD remains available.  Aleah Lopez MS RDN CDN #[id number]. Nutrition follow up     Pt seen for malnutrition follow up     Hospital course as per chart: Pt 65 y/o F with PMH: arthritis, hypothyroidism, pulmonary HTN, DM on insulin, HTN, COPD/CHRIS, RA on chronic prednisone, chronic tremors, SBO S/P ex lap with lysis of adhesions c/b evisceration, admitted with agitation x 3 weeks of unclear etiology, found to be significantly hypothyroid, MRI brain found to have small right sided SDH, hospital course c/b catatonia improved with Ativan, DKA; S/P swallow evaluation (02/27) recommending soft texture; pending optimization of psychiatric regimen.    Source: Patient [ ]    Family [ ]     other [x]; Medical record, RN    Pt confused as per documentation - refused interview, states "leave my room now". As per RN, pt with very good appetite and PO intake, consuming 100% of meals and ordering two trays. RN denies pt with difficulty chewing/swallowing soft diet. Reports pt complains of abdominal pain and nausea - provider aware. Denies pt with vomiting, diarrhea, or constipation, last BM yesterday (03/22).     Diet: Soft + Consistent Carbohydrate     Enteral /Parenteral Nutrition: n/a    Current Weight: (01/29) 205.6 pounds -> (02/12) 206.3 pounds -> (02/26) 198.1 pounds -> (03/18) 197.5 pounds -?accuracy of weight fluctuations likely due to fluid shifts as pt with edema, will continue to monitor.   % Weight Change: n/a    Pertinent Medications: MEDICATIONS  (STANDING):  budesonide  80 MICROgram(s)/formoterol 4.5 MICROgram(s) Inhaler 2 Puff(s) Inhalation two times a day  dextrose 5%. 1000 milliLiter(s) (50 mL/Hr) IV Continuous <Continuous>  dextrose 50% Injectable 12.5 Gram(s) IV Push once  dextrose 50% Injectable 25 Gram(s) IV Push once  dextrose 50% Injectable 25 Gram(s) IV Push once  heparin  Injectable 5000 Unit(s) SubCutaneous every 8 hours  influenza   Vaccine 0.5 milliLiter(s) IntraMuscular once  insulin glargine Injectable (LANTUS) 28 Unit(s) SubCutaneous <User Schedule>  insulin lispro (HumaLOG) corrective regimen sliding scale   SubCutaneous three times a day before meals  insulin lispro (HumaLOG) corrective regimen sliding scale   SubCutaneous at bedtime  insulin lispro Injectable (HumaLOG) 10 Unit(s) SubCutaneous before dinner  insulin lispro Injectable (HumaLOG) 14 Unit(s) SubCutaneous before breakfast  insulin lispro Injectable (HumaLOG) 12 Unit(s) SubCutaneous before lunch  levothyroxine 175 MICROGram(s) Oral daily  losartan 25 milliGRAM(s) Oral daily  metoprolol tartrate 25 milliGRAM(s) Oral two times a day  multivitamin/minerals/iron Oral Solution (CENTRUM) 15 milliLiter(s) Oral daily  nystatin Powder 1 Application(s) Topical every 12 hours  OLANZapine 2.5 milliGRAM(s) Oral two times a day  pantoprazole    Tablet 40 milliGRAM(s) Oral <User Schedule>  predniSONE   Tablet 15 milliGRAM(s) Oral daily  primidone 50 milliGRAM(s) Oral at bedtime  simvastatin 20 milliGRAM(s) Oral at bedtime  thiamine 100 milliGRAM(s) Oral daily    MEDICATIONS  (PRN):  acetaminophen   Tablet .. 975 milliGRAM(s) Oral every 6 hours PRN Moderate Pain (4 - 6), Severe Pain (7 - 10)  acetaminophen  Suppository .. 650 milliGRAM(s) Rectal every 6 hours PRN Temp greater or equal to 38C (100.4F)  albuterol/ipratropium for Nebulization 3 milliLiter(s) Nebulizer every 6 hours PRN Shortness of Breath and/or Wheezing  dextrose 40% Gel 15 Gram(s) Oral once PRN Blood Glucose LESS THAN 70 milliGRAM(s)/deciLiter  glucagon  Injectable 1 milliGRAM(s) IntraMuscular once PRN Glucose <70 milliGRAM(s)/deciLiter  melatonin 3 milliGRAM(s) Oral at bedtime PRN Insomnia    Pertinent Labs: last obtained (03/18) BUN 25,   Finger sticks: (03/23) 283 (03/22) 182 - 198   (01/28) HbA1c 9.0%     Skin: no noted pressure injuries as per documentation.   Noted +1 generalized edema as per flow sheets (previously +2 generalized and +3 naga. ankle and foot edema).     Estimated Needs:   [x] no change since previous assessment  [ ] recalculated:     Previous Nutrition Diagnoses:   [x] Malnutrition; moderate - pt now with good appetite and PO intake; care plan achieved.   [x] Altered Nutrition Related Lab Values - being addressed with Consistent Carbohydrate diet; pt refusing interview/education.    New Nutrition Diagnosis: [x] not applicable    Interventions:     1. Recommend continue soft + Consistent Carbohydrate diet. Defer diet/fluid consistencies to medical team/SLP recommendations. Will continue to monitor and adjust as needed.  2. Continue Multivitamin/minerals/iron to optimize nutrient intake.  3. Provide nutrition therapy education as needed/requested.   4. Continue to obtain weights to identify changes if any.     Monitoring and Evaluation:     [x] PO intake [x] Tolerance to diet prescription [x] weights [x] follow up per protocol    [x] other: nutrition therapy education     RD remains available.  Aleah Lopez MS RDN CDN #[id number]. Nutrition follow up     Pt seen for malnutrition follow up     Hospital course as per chart: Pt 65 y/o F with PMH: arthritis, hypothyroidism, pulmonary HTN, DM on insulin, HTN, COPD/CHRIS, RA on chronic prednisone, chronic tremors, SBO S/P ex lap with lysis of adhesions c/b evisceration, admitted with agitation x 3 weeks of unclear etiology, found to be significantly hypothyroid, MRI brain found to have small right sided SDH, hospital course c/b catatonia improved with Ativan, DKA; S/P swallow evaluation (02/27) recommending soft texture; pending optimization of psychiatric regimen.    Source: Patient [ ]    Family [ ]     other [x]; Medical record, RN    Pt confused as per documentation - refused interview, states "leave my room now". As per RN, pt with very good appetite and PO intake, consuming 100% of meals. RN denies pt with difficulty chewing/swallowing soft diet. Reports pt complains of abdominal pain and nausea - provider aware. Denies pt with vomiting, diarrhea, or constipation, last BM yesterday (03/22).     Diet: Soft + Consistent Carbohydrate     Enteral /Parenteral Nutrition: n/a    Current Weight: (01/29) 205.6 pounds -> (02/12) 206.3 pounds -> (02/26) 198.1 pounds -> (03/18) 197.5 pounds -?accuracy of weight fluctuations likely due to fluid shifts as pt with edema, will continue to monitor.   % Weight Change: n/a    Pertinent Medications: MEDICATIONS  (STANDING):  budesonide  80 MICROgram(s)/formoterol 4.5 MICROgram(s) Inhaler 2 Puff(s) Inhalation two times a day  dextrose 5%. 1000 milliLiter(s) (50 mL/Hr) IV Continuous <Continuous>  dextrose 50% Injectable 12.5 Gram(s) IV Push once  dextrose 50% Injectable 25 Gram(s) IV Push once  dextrose 50% Injectable 25 Gram(s) IV Push once  heparin  Injectable 5000 Unit(s) SubCutaneous every 8 hours  influenza   Vaccine 0.5 milliLiter(s) IntraMuscular once  insulin glargine Injectable (LANTUS) 28 Unit(s) SubCutaneous <User Schedule>  insulin lispro (HumaLOG) corrective regimen sliding scale   SubCutaneous three times a day before meals  insulin lispro (HumaLOG) corrective regimen sliding scale   SubCutaneous at bedtime  insulin lispro Injectable (HumaLOG) 10 Unit(s) SubCutaneous before dinner  insulin lispro Injectable (HumaLOG) 14 Unit(s) SubCutaneous before breakfast  insulin lispro Injectable (HumaLOG) 12 Unit(s) SubCutaneous before lunch  levothyroxine 175 MICROGram(s) Oral daily  losartan 25 milliGRAM(s) Oral daily  metoprolol tartrate 25 milliGRAM(s) Oral two times a day  multivitamin/minerals/iron Oral Solution (CENTRUM) 15 milliLiter(s) Oral daily  nystatin Powder 1 Application(s) Topical every 12 hours  OLANZapine 2.5 milliGRAM(s) Oral two times a day  pantoprazole    Tablet 40 milliGRAM(s) Oral <User Schedule>  predniSONE   Tablet 15 milliGRAM(s) Oral daily  primidone 50 milliGRAM(s) Oral at bedtime  simvastatin 20 milliGRAM(s) Oral at bedtime  thiamine 100 milliGRAM(s) Oral daily    MEDICATIONS  (PRN):  acetaminophen   Tablet .. 975 milliGRAM(s) Oral every 6 hours PRN Moderate Pain (4 - 6), Severe Pain (7 - 10)  acetaminophen  Suppository .. 650 milliGRAM(s) Rectal every 6 hours PRN Temp greater or equal to 38C (100.4F)  albuterol/ipratropium for Nebulization 3 milliLiter(s) Nebulizer every 6 hours PRN Shortness of Breath and/or Wheezing  dextrose 40% Gel 15 Gram(s) Oral once PRN Blood Glucose LESS THAN 70 milliGRAM(s)/deciLiter  glucagon  Injectable 1 milliGRAM(s) IntraMuscular once PRN Glucose <70 milliGRAM(s)/deciLiter  melatonin 3 milliGRAM(s) Oral at bedtime PRN Insomnia    Pertinent Labs: last obtained (03/18) BUN 25,   Finger sticks: (03/23) 283 (03/22) 182 - 198   (01/28) HbA1c 9.0%     Skin: no noted pressure injuries as per documentation.   Noted +1 generalized edema as per flow sheets (previously +2 generalized and +3 naga. ankle and foot edema).     Estimated Needs:   [x] no change since previous assessment  [ ] recalculated:     Previous Nutrition Diagnoses:   [x] Malnutrition; moderate - pt now with good appetite and PO intake; care plan achieved.   [x] Altered Nutrition Related Lab Values - being addressed with Consistent Carbohydrate diet; pt refusing interview/education.    New Nutrition Diagnosis: [x] not applicable    Interventions:     1. Recommend continue soft + Consistent Carbohydrate diet. Defer diet/fluid consistencies to medical team/SLP recommendations. Will continue to monitor and adjust as needed.  2. Continue Multivitamin/minerals/iron to optimize nutrient intake.  3. Provide nutrition therapy education as needed/requested.   4. Continue to obtain weights to identify changes if any.     Monitoring and Evaluation:     [x] PO intake [x] Tolerance to diet prescription [x] weights [x] follow up per protocol    [x] other: nutrition therapy education     RD remains available.  Aleah Lopez MS RDN CDN #[phone].

## 2020-03-23 NOTE — PROGRESS NOTE ADULT - SUBJECTIVE AND OBJECTIVE BOX
[facility] Division of Hospital Medicine  Fe Rodriguez DO  Pager (M-F, [phone]-[phone]): [phone]  Other Times:  [phone]    Patient is a 64y old  Female who presents with a chief complaint of change in mental status (22 Mar 2020 18:52)      SUBJECTIVE / OVERNIGHT EVENTS:    patient telling me to leave the room and that i am not her doctor.    patient not able to participate in ROS    MEDICATIONS  (STANDING):  budesonide  80 MICROgram(s)/formoterol 4.5 MICROgram(s) Inhaler 2 Puff(s) Inhalation two times a day  dextrose 5%. 1000 milliLiter(s) (50 mL/Hr) IV Continuous <Continuous>  dextrose 50% Injectable 12.5 Gram(s) IV Push once  dextrose 50% Injectable 25 Gram(s) IV Push once  dextrose 50% Injectable 25 Gram(s) IV Push once  heparin  Injectable 5000 Unit(s) SubCutaneous every 8 hours  influenza   Vaccine 0.5 milliLiter(s) IntraMuscular once  insulin glargine Injectable (LANTUS) 28 Unit(s) SubCutaneous <User Schedule>  insulin lispro (HumaLOG) corrective regimen sliding scale   SubCutaneous three times a day before meals  insulin lispro (HumaLOG) corrective regimen sliding scale   SubCutaneous at bedtime  insulin lispro Injectable (HumaLOG) 10 Unit(s) SubCutaneous before dinner  insulin lispro Injectable (HumaLOG) 14 Unit(s) SubCutaneous before breakfast  insulin lispro Injectable (HumaLOG) 12 Unit(s) SubCutaneous before lunch  levothyroxine 175 MICROGram(s) Oral daily  losartan 25 milliGRAM(s) Oral daily  metoprolol tartrate 25 milliGRAM(s) Oral two times a day  multivitamin/minerals/iron Oral Solution (CENTRUM) 15 milliLiter(s) Oral daily  nystatin Powder 1 Application(s) Topical every 12 hours  OLANZapine 2.5 milliGRAM(s) Oral two times a day  pantoprazole    Tablet 40 milliGRAM(s) Oral <User Schedule>  predniSONE   Tablet 15 milliGRAM(s) Oral daily  primidone 50 milliGRAM(s) Oral at bedtime  simvastatin 20 milliGRAM(s) Oral at bedtime  thiamine 100 milliGRAM(s) Oral daily    MEDICATIONS  (PRN):  acetaminophen   Tablet .. 975 milliGRAM(s) Oral every 6 hours PRN Moderate Pain (4 - 6), Severe Pain (7 - 10)  acetaminophen  Suppository .. 650 milliGRAM(s) Rectal every 6 hours PRN Temp greater or equal to 38C (100.4F)  albuterol/ipratropium for Nebulization 3 milliLiter(s) Nebulizer every 6 hours PRN Shortness of Breath and/or Wheezing  dextrose 40% Gel 15 Gram(s) Oral once PRN Blood Glucose LESS THAN 70 milliGRAM(s)/deciLiter  glucagon  Injectable 1 milliGRAM(s) IntraMuscular once PRN Glucose <70 milliGRAM(s)/deciLiter  melatonin 3 milliGRAM(s) Oral at bedtime PRN Insomnia      CAPILLARY BLOOD GLUCOSE      POCT Blood Glucose.: 283 mg/dL (23 Mar 2020 08:50)  POCT Blood Glucose.: 198 mg/dL (22 Mar 2020 17:40)  POCT Blood Glucose.: 182 mg/dL (22 Mar 2020 12:10)    I&O's Summary    22 Mar 2020 07:01  -  23 Mar 2020 07:00  --------------------------------------------------------  IN: 240 mL / OUT: 0 mL / NET: 240 mL        PHYSICAL EXAM:  Vital Signs Last 24 Hrs  T(C): 36.8 (22 Mar 2020 21:27), Max: 36.9 (22 Mar 2020 12:30)  T(F): 98.2 (22 Mar 2020 21:27), Max: 98.4 (22 Mar 2020 12:30)  HR: 88 (23 Mar 2020 11:40) (88 - 105)  BP: 129/53 (23 Mar 2020 07:05) (112/69 - 129/53)  BP(mean): --  RR: 18 (23 Mar 2020 11:40) (17 - 18)  SpO2: 98% (23 Mar 2020 11:40) (98% - 99%)      patient deferred physical exam     LABS:                      RADIOLOGY & ADDITIONAL TESTS:  Results Reviewed:   Imaging Personally Reviewed:  Electrocardiogram Personally Reviewed:    COORDINATION OF CARE:  Care Discussed with Consultants/Other Providers [Y/N]:  Prior or Outpatient Records Reviewed [Y/N]:  KEELEY Barajas

## 2020-03-23 NOTE — PROGRESS NOTE ADULT - PROBLEM SELECTOR PLAN 3
#DKA- resolved  - continue with moderate ISS  - lantus 28U qdaily  - 14 units humalog for breakfast.  - 12 units humalog for lunch   - 10 units humalog for dinner.   - endocrine team following.  - Discharge plan per endocrine  - If patient refuses insulin, not to be fed

## 2020-03-24 LAB
GLUCOSE BLDC GLUCOMTR-MCNC: 188 MG/DL — HIGH (ref 70–99)
GLUCOSE BLDC GLUCOMTR-MCNC: 273 MG/DL — HIGH (ref 70–99)
GLUCOSE BLDC GLUCOMTR-MCNC: 72 MG/DL — SIGNIFICANT CHANGE UP (ref 70–99)

## 2020-03-24 PROCEDURE — 99231 SBSQ HOSP IP/OBS SF/LOW 25: CPT

## 2020-03-24 PROCEDURE — 99233 SBSQ HOSP IP/OBS HIGH 50: CPT

## 2020-03-24 RX ORDER — INSULIN GLARGINE 100 [IU]/ML
30 INJECTION, SOLUTION SUBCUTANEOUS EVERY MORNING
Refills: 0 | Status: DISCONTINUED | OUTPATIENT
Start: 2020-03-24 | End: 2020-03-26

## 2020-03-24 RX ADMIN — PRIMIDONE 50 MILLIGRAM(S): 250 TABLET ORAL at 22:02

## 2020-03-24 RX ADMIN — Medication 14 UNIT(S): at 13:34

## 2020-03-24 RX ADMIN — OLANZAPINE 2.5 MILLIGRAM(S): 15 TABLET, FILM COATED ORAL at 18:34

## 2020-03-24 RX ADMIN — Medication 100 MILLIGRAM(S): at 13:37

## 2020-03-24 RX ADMIN — NYSTATIN CREAM 1 APPLICATION(S): 100000 CREAM TOPICAL at 18:34

## 2020-03-24 RX ADMIN — Medication 975 MILLIGRAM(S): at 04:30

## 2020-03-24 RX ADMIN — OLANZAPINE 2.5 MILLIGRAM(S): 15 TABLET, FILM COATED ORAL at 05:50

## 2020-03-24 RX ADMIN — Medication 150 MICROGRAM(S): at 05:55

## 2020-03-24 RX ADMIN — SIMVASTATIN 20 MILLIGRAM(S): 20 TABLET, FILM COATED ORAL at 22:03

## 2020-03-24 RX ADMIN — HEPARIN SODIUM 5000 UNIT(S): 5000 INJECTION INTRAVENOUS; SUBCUTANEOUS at 22:02

## 2020-03-24 RX ADMIN — Medication 3 MILLIGRAM(S): at 22:03

## 2020-03-24 RX ADMIN — HEPARIN SODIUM 5000 UNIT(S): 5000 INJECTION INTRAVENOUS; SUBCUTANEOUS at 05:55

## 2020-03-24 RX ADMIN — Medication 25 MILLIGRAM(S): at 05:50

## 2020-03-24 RX ADMIN — Medication 6: at 13:33

## 2020-03-24 RX ADMIN — BUDESONIDE AND FORMOTEROL FUMARATE DIHYDRATE 2 PUFF(S): 160; 4.5 AEROSOL RESPIRATORY (INHALATION) at 18:34

## 2020-03-24 RX ADMIN — Medication 15 MILLILITER(S): at 13:37

## 2020-03-24 RX ADMIN — Medication 25 MILLIGRAM(S): at 18:34

## 2020-03-24 RX ADMIN — Medication 15 MILLIGRAM(S): at 05:50

## 2020-03-24 RX ADMIN — LOSARTAN POTASSIUM 25 MILLIGRAM(S): 100 TABLET, FILM COATED ORAL at 05:50

## 2020-03-24 RX ADMIN — NYSTATIN CREAM 1 APPLICATION(S): 100000 CREAM TOPICAL at 13:33

## 2020-03-24 RX ADMIN — PANTOPRAZOLE SODIUM 40 MILLIGRAM(S): 20 TABLET, DELAYED RELEASE ORAL at 13:37

## 2020-03-24 RX ADMIN — BUDESONIDE AND FORMOTEROL FUMARATE DIHYDRATE 2 PUFF(S): 160; 4.5 AEROSOL RESPIRATORY (INHALATION) at 05:56

## 2020-03-24 NOTE — PROGRESS NOTE ADULT - ASSESSMENT
65 y/o F w/h/o uncontrolled T2DM with unknown complications. Also h/o functional paraplegia, hypothyroidism, pulmonary hypertension, COPD, RA on chronic prednisone, SBO s/p ex lap with lysis of adhesions c/b multifocal pneumonia with AMS of unknown etiology. Pt was on catatonic state that resolved with ativan administration and is alert but still confused and disoriented. Refusing meds/treatments on and off. Glycemic control variable between 70s to 200s in last 24 hours due to  unpredictable PO intake and not taking insulin on and off.  No hypoglycemia. Taking synthroid and Prednisone more consistently now. Unable to make insulin adjustment at this time since pt didn't get Lantus and Humalog this am with rebound hyperglycemia. Pt received meal tie Humalog but unable to determine how much she ate with BG <100s this pm.

## 2020-03-24 NOTE — PROGRESS NOTE ADULT - SUBJECTIVE AND OBJECTIVE BOX
[facility] Division of Hospital Medicine  Fe Rodriguez DO  Pager (DEACON-F, 7W-5F): [phone]  Other Times:  [phone]    Patient is a 64y old  Female who presents with a chief complaint of change in mental status (23 Mar 2020 12:04)      SUBJECTIVE / OVERNIGHT EVENTS:    patient refuses to acknowledge me as her physician and does not want me in to the room asking me to "get out now"      MEDICATIONS  (STANDING):  budesonide  80 MICROgram(s)/formoterol 4.5 MICROgram(s) Inhaler 2 Puff(s) Inhalation two times a day  dextrose 5%. 1000 milliLiter(s) (50 mL/Hr) IV Continuous <Continuous>  dextrose 50% Injectable 12.5 Gram(s) IV Push once  dextrose 50% Injectable 25 Gram(s) IV Push once  dextrose 50% Injectable 25 Gram(s) IV Push once  heparin  Injectable 5000 Unit(s) SubCutaneous every 8 hours  influenza   Vaccine 0.5 milliLiter(s) IntraMuscular once  insulin glargine Injectable (LANTUS) 30 Unit(s) SubCutaneous every morning  insulin lispro (HumaLOG) corrective regimen sliding scale   SubCutaneous three times a day before meals  insulin lispro (HumaLOG) corrective regimen sliding scale   SubCutaneous at bedtime  insulin lispro Injectable (HumaLOG) 16 Unit(s) SubCutaneous before breakfast  insulin lispro Injectable (HumaLOG) 14 Unit(s) SubCutaneous before lunch  insulin lispro Injectable (HumaLOG) 10 Unit(s) SubCutaneous before dinner  levothyroxine 150 MICROGram(s) Oral daily  losartan 25 milliGRAM(s) Oral daily  metoprolol tartrate 25 milliGRAM(s) Oral two times a day  multivitamin/minerals/iron Oral Solution (CENTRUM) 15 milliLiter(s) Oral daily  nystatin Powder 1 Application(s) Topical every 12 hours  OLANZapine 2.5 milliGRAM(s) Oral two times a day  pantoprazole    Tablet 40 milliGRAM(s) Oral <User Schedule>  predniSONE   Tablet 15 milliGRAM(s) Oral daily  primidone 50 milliGRAM(s) Oral at bedtime  simvastatin 20 milliGRAM(s) Oral at bedtime  thiamine 100 milliGRAM(s) Oral daily    MEDICATIONS  (PRN):  acetaminophen   Tablet .. 975 milliGRAM(s) Oral every 6 hours PRN Moderate Pain (4 - 6), Severe Pain (7 - 10)  acetaminophen  Suppository .. 650 milliGRAM(s) Rectal every 6 hours PRN Temp greater or equal to 38C (100.4F)  albuterol/ipratropium for Nebulization 3 milliLiter(s) Nebulizer every 6 hours PRN Shortness of Breath and/or Wheezing  dextrose 40% Gel 15 Gram(s) Oral once PRN Blood Glucose LESS THAN 70 milliGRAM(s)/deciLiter  glucagon  Injectable 1 milliGRAM(s) IntraMuscular once PRN Glucose <70 milliGRAM(s)/deciLiter  melatonin 3 milliGRAM(s) Oral at bedtime PRN Insomnia      CAPILLARY BLOOD GLUCOSE      POCT Blood Glucose.: 217 mg/dL (23 Mar 2020 17:17)  POCT Blood Glucose.: 218 mg/dL (23 Mar 2020 12:23)    I&O's Summary    23 Mar 2020 07:01  -  24 Mar 2020 07:00  --------------------------------------------------------  IN: 240 mL / OUT: 0 mL / NET: 240 mL        PHYSICAL EXAM:  Vital Signs Last 24 Hrs  T(C): --  T(F): --  HR: 93 (24 Mar 2020 04:21) (93 - 93)  BP: 138/82 (24 Mar 2020 04:21) (138/82 - 138/82)  BP(mean): --      defers PE    LABS:                      RADIOLOGY & ADDITIONAL TESTS:  Results Reviewed:   Imaging Personally Reviewed:  Electrocardiogram Personally Reviewed:    COORDINATION OF CARE:  Care Discussed with Consultants/Other Providers [Y/N]:  Prior or Outpatient Records Reviewed [Y/N]:

## 2020-03-24 NOTE — PROGRESS NOTE ADULT - SUBJECTIVE AND OBJECTIVE BOX
DIABETES FOLLOW UP NOTE: Saw pt earlier today  INTERVAL HX: 63 y/o F w/h/o uncontrolled T2DM with unknown complications. Also h/o functional paraplegia, hypothyroidism, pulmonary hypertension, COPD, RA on chronic prednisone, SBO s/p ex lap with lysis of adhesions c/b multifocal pneumonia with AMS of unknown etiology. Pt was on catatonic state that resolved with ativan administration and is alert but still confused and disoriented. Refusing meds/treatments on and off. Glycemic control above goal for the last 24 hours but noted BG this pm in 70s. Pt tolerating POs but with unpredictable PO intake. Refused BG testing and insulin administration this am including Lantus dose with BG >200s ac lunch. No hypoglycemia. Taking synthroid and Prednisone more consistently now. No hypoglycemia.       Review of Systems:  Refused to talk to this provider. Speaking foreign language at time of visit.    Allergies    Depakote (Other)  Seroquel (Other (Severe))    Intolerances      MEDICATIONS:  insulin glargine Injectable (LANTUS) 30 Unit(s) SubCutaneous every morning  insulin lispro (HumaLOG) corrective regimen sliding scale   SubCutaneous three times a day before meals  insulin lispro (HumaLOG) corrective regimen sliding scale   SubCutaneous at bedtime  insulin lispro Injectable (HumaLOG) 16 Unit(s) SubCutaneous before breakfast  insulin lispro Injectable (HumaLOG) 14 Unit(s) SubCutaneous before lunch  insulin lispro Injectable (HumaLOG) 10 Unit(s) SubCutaneous before dinner  levothyroxine 150 MICROGram(s) Oral daily  predniSONE   Tablet 15 milliGRAM(s) Oral daily  simvastatin 20 milliGRAM(s) Oral at bedtime      PHYSICAL EXAM:  VITALS: T(C): 36.8 (03-24-20 @ 18:32)  T(F): 98.3 (03-24-20 @ 18:32), Max: 98.3 (03-24-20 @ 18:32)  HR: 100 (03-24-20 @ 18:32) (93 - 100)  BP: 117/74 (03-24-20 @ 18:32) (117/74 - 138/82)  RR:  (18 - 18)  SpO2:  (93% - 98%)  Wt(kg): --  GENERAL: Female laying in bed in NAD  Abdomen: Soft, nontender, non distended. Obese,   Extremities: Warm, no edema in all 4 exts  NEURO: Alert but confused    LABS:  POCT Blood Glucose.: 72 mg/dL (03-24-20 @ 17:41)  POCT Blood Glucose.: 273 mg/dL (03-24-20 @ 13:19)  POCT Blood Glucose.: 217 mg/dL (03-23-20 @ 17:17)  POCT Blood Glucose.: 218 mg/dL (03-23-20 @ 12:23)  POCT Blood Glucose.: 283 mg/dL (03-23-20 @ 08:50)  POCT Blood Glucose.: 198 mg/dL (03-22-20 @ 17:40)  POCT Blood Glucose.: 182 mg/dL (03-22-20 @ 12:10)  POCT Blood Glucose.: 186 mg/dL (03-22-20 @ 08:36)      Thyroid Function Tests:  03-20 @ 11:43 TSH -- FreeT4 2.3 T3 69 Anti TPO -- Anti Thyroglobulin Ab -- TSI --  03-19 @ 13:39 TSH -- FreeT4 2.3 T3 67 Anti TPO -- Anti Thyroglobulin Ab -- TSI --      Hemoglobin A1C, Whole Blood: 9.0 % <H> [4.0 - 5.6] (01-28-20 @ 19:30)  Hemoglobin A1C, Whole Blood: 9.2 % <H> [4.0 - 5.6] (01-27-20 @ 17:36)

## 2020-03-24 NOTE — PROGRESS NOTE ADULT - PROBLEM SELECTOR PLAN 2
-T4 low x 2 days Levothyroxine changed to 150mcg daily   -Recheck Free T4 in 1 week 3/31/20   Contact info: [phone] (24/7). pager [phone]      Due to [facility] policy during the evolving novel coronavirus outbreak, efforts are being made to limit unnecessary patient contact to limit the spread of the disease.  Accordingly, patients without clear indication for physical exam or face to face interview from the endocrine team insulin regimen will be adjusted in conjunction with conversation with primary provider team, This is being done for the safety of all the patients we care for.    For any questions please contact team:  Cell: [phone] today 3/23/20 -T4 high x 2 days Levothyroxine changed to 150mcg daily   -Recheck Free T4 in 1 week 3/31/20   Contact info: [phone] (24/7). pager [phone]      Due to [facility] policy during the evolving novel coronavirus outbreak, efforts are being made to limit unnecessary patient contact to limit the spread of the disease.  Accordingly, patients without clear indication for physical exam or face to face interview from the endocrine team insulin regimen will be adjusted in conjunction with conversation with primary provider team, This is being done for the safety of all the patients we care for.    For any questions please contact team:  Cell: [phone] today 3/23/20 - Free T4 high x 2 days Levothyroxine changed to 150mcg daily   -Recheck Free T4 in 1 week 3/31/20   Contact info: [phone] (24/7). pager [phone]      Due to [facility] policy during the evolving novel coronavirus outbreak, efforts are being made to limit unnecessary patient contact to limit the spread of the disease.  Accordingly, patients without clear indication for physical exam or face to face interview from the endocrine team insulin regimen will be adjusted in conjunction with conversation with primary provider team, This is being done for the safety of all the patients we care for.    For any questions please contact team:  Cell: [phone] today 3/23/20

## 2020-03-24 NOTE — PROGRESS NOTE ADULT - PROBLEM SELECTOR PLAN 1
-Test BG ac and hs  -C/w Lantus dose to 30 units  q am  -C/w Humalog to 16-14-10 ac meals. MAKE SURE PT EATS  -C/w Humalog moderate correction scales ac and hs  -Will follow up BG levels and adjust as needed  -Discharge plan is basal/bolus regimen. Pt likely going to rehab.

## 2020-03-24 NOTE — PROGRESS NOTE ADULT - PROBLEM SELECTOR PLAN 3
#DKA- resolved  - continue with moderate ISS  - lantus 30U qdaily  - 14 units humalog for breakfast.  - 12 units humalog for lunch   - 10 units humalog for dinner.   - endocrine team following.  - Discharge plan per endocrine  - If patient refuses insulin, not to be fed

## 2020-03-24 NOTE — PROGRESS NOTE ADULT - PROBLEM SELECTOR PLAN 10
- DVT ppx: HSQ q8hrs   - Diet: speech soft diet  - Dispo: Patients son refused to pick her up despite appropriate discharge plan. She is now set up for rehab, 24hr dc notice given    Transitions of Care Status:  1.  Name of PCP:  Kristie Nails MD (PCP)  2.  PCP Contacted on Admission: [ ] Y    [x] N    3.  PCP contacted at Discharge: [ ] Y    [ ] N    [ ] N/A  4.  Post-Discharge Appointment Date and Location:  5.  Summary of Handoff given to PCP:

## 2020-03-25 LAB
GLUCOSE BLDC GLUCOMTR-MCNC: 208 MG/DL — HIGH (ref 70–99)
GLUCOSE BLDC GLUCOMTR-MCNC: 318 MG/DL — HIGH (ref 70–99)

## 2020-03-25 PROCEDURE — 99233 SBSQ HOSP IP/OBS HIGH 50: CPT

## 2020-03-25 PROCEDURE — 99231 SBSQ HOSP IP/OBS SF/LOW 25: CPT

## 2020-03-25 RX ORDER — OXYCODONE HYDROCHLORIDE 5 MG/1
5 TABLET ORAL ONCE
Refills: 0 | Status: DISCONTINUED | OUTPATIENT
Start: 2020-03-25 | End: 2020-03-25

## 2020-03-25 RX ADMIN — HEPARIN SODIUM 5000 UNIT(S): 5000 INJECTION INTRAVENOUS; SUBCUTANEOUS at 17:39

## 2020-03-25 RX ADMIN — INSULIN GLARGINE 30 UNIT(S): 100 INJECTION, SOLUTION SUBCUTANEOUS at 08:54

## 2020-03-25 RX ADMIN — Medication 4: at 13:45

## 2020-03-25 RX ADMIN — PRIMIDONE 50 MILLIGRAM(S): 250 TABLET ORAL at 21:39

## 2020-03-25 RX ADMIN — Medication 100 MILLIGRAM(S): at 13:46

## 2020-03-25 RX ADMIN — Medication 975 MILLIGRAM(S): at 16:02

## 2020-03-25 RX ADMIN — BUDESONIDE AND FORMOTEROL FUMARATE DIHYDRATE 2 PUFF(S): 160; 4.5 AEROSOL RESPIRATORY (INHALATION) at 17:39

## 2020-03-25 RX ADMIN — Medication 975 MILLIGRAM(S): at 03:45

## 2020-03-25 RX ADMIN — Medication 25 MILLIGRAM(S): at 19:35

## 2020-03-25 RX ADMIN — Medication 14 UNIT(S): at 13:45

## 2020-03-25 RX ADMIN — OLANZAPINE 2.5 MILLIGRAM(S): 15 TABLET, FILM COATED ORAL at 06:16

## 2020-03-25 RX ADMIN — Medication 16 UNIT(S): at 08:54

## 2020-03-25 RX ADMIN — OXYCODONE HYDROCHLORIDE 5 MILLIGRAM(S): 5 TABLET ORAL at 17:39

## 2020-03-25 RX ADMIN — NYSTATIN CREAM 1 APPLICATION(S): 100000 CREAM TOPICAL at 06:16

## 2020-03-25 RX ADMIN — Medication 8: at 08:54

## 2020-03-25 RX ADMIN — Medication 3 MILLIGRAM(S): at 21:38

## 2020-03-25 RX ADMIN — NYSTATIN CREAM 1 APPLICATION(S): 100000 CREAM TOPICAL at 17:39

## 2020-03-25 RX ADMIN — SIMVASTATIN 20 MILLIGRAM(S): 20 TABLET, FILM COATED ORAL at 21:38

## 2020-03-25 RX ADMIN — Medication 150 MICROGRAM(S): at 06:03

## 2020-03-25 RX ADMIN — OLANZAPINE 2.5 MILLIGRAM(S): 15 TABLET, FILM COATED ORAL at 17:39

## 2020-03-25 RX ADMIN — OXYCODONE HYDROCHLORIDE 5 MILLIGRAM(S): 5 TABLET ORAL at 18:38

## 2020-03-25 RX ADMIN — Medication 15 MILLIGRAM(S): at 06:04

## 2020-03-25 RX ADMIN — HEPARIN SODIUM 5000 UNIT(S): 5000 INJECTION INTRAVENOUS; SUBCUTANEOUS at 06:02

## 2020-03-25 RX ADMIN — PANTOPRAZOLE SODIUM 40 MILLIGRAM(S): 20 TABLET, DELAYED RELEASE ORAL at 13:47

## 2020-03-25 RX ADMIN — Medication 25 MILLIGRAM(S): at 06:16

## 2020-03-25 RX ADMIN — Medication 15 MILLILITER(S): at 13:46

## 2020-03-25 RX ADMIN — Medication 975 MILLIGRAM(S): at 03:05

## 2020-03-25 RX ADMIN — BUDESONIDE AND FORMOTEROL FUMARATE DIHYDRATE 2 PUFF(S): 160; 4.5 AEROSOL RESPIRATORY (INHALATION) at 06:03

## 2020-03-25 NOTE — PROGRESS NOTE ADULT - PROBLEM SELECTOR PLAN 10
- DVT ppx: HSQ q8hrs   - Diet: speech soft diet  - Dispo: Patients son refused to pick her up despite appropriate discharge plan. She is now set up for rehab, 24hr dc notice given, son appealed dc     Transitions of Care Status:  1.  Name of PCP:  Kristie Nails MD (PCP)  2.  PCP Contacted on Admission: [ ] Y    [x] N    3.  PCP contacted at Discharge: [ ] Y    [ ] N    [ ] N/A  4.  Post-Discharge Appointment Date and Location:  5.  Summary of Handoff given to PCP:

## 2020-03-25 NOTE — PROGRESS NOTE ADULT - SUBJECTIVE AND OBJECTIVE BOX
DIABETES FOLLOW UP NOTE: Saw pt earlier today  INTERVAL HX: 63 y/o F w/h/o uncontrolled T2DM with unknown complications. Also h/o functional paraplegia, hypothyroidism, pulmonary hypertension, COPD, RA on chronic prednisone, SBO s/p ex lap with lysis of adhesions c/b multifocal pneumonia with AMS of unknown etiology. Pt was on catatonic state that resolved with ativan administration and is alert but still confused and disoriented. Refusing meds/treatments on and off. Glycemic control remains variable. Noted fasting hyperglycemia today due to the fact that pt refused Lantus yesterday. No hypoglycemia. Took Prednisone and Synthroid today.  Appears less confused today but pt has AMS fluctuations throughout the day. States she ate eggs for breakfast.        Review of Systems:  Unable to answer    Allergies    Depakote (Other)  Seroquel (Other (Severe))    Intolerances      MEDICATIONS:  insulin glargine Injectable (LANTUS) 30 Unit(s) SubCutaneous every morning  insulin lispro (HumaLOG) corrective regimen sliding scale   SubCutaneous three times a day before meals  insulin lispro (HumaLOG) corrective regimen sliding scale   SubCutaneous at bedtime  insulin lispro Injectable (HumaLOG) 16 Unit(s) SubCutaneous before breakfast  insulin lispro Injectable (HumaLOG) 14 Unit(s) SubCutaneous before lunch  insulin lispro Injectable (HumaLOG) 10 Unit(s) SubCutaneous before dinner  levothyroxine 150 MICROGram(s) Oral daily  predniSONE   Tablet 15 milliGRAM(s) Oral daily  simvastatin 20 milliGRAM(s) Oral at bedtime        PHYSICAL EXAM:  VITALS: T(C): 36.8 (03-24-20 @ 18:32)  T(F): 98.3 (03-24-20 @ 18:32), Max: 98.3 (03-24-20 @ 18:32)  HR: 96 (03-25-20 @ 06:10) (96 - 100)  BP: 98/69 (03-25-20 @ 06:10) (98/69 - 117/74)  RR:  (18 - 18)  SpO2:  (98% - 98%)  Wt(kg): --  GENERAL: Female laying in bed in NAD  Abdomen: Soft, nontender, non distended. obese  Extremities: Warm, no edema in all 4 exts  NEURO: A&O X3    LABS:  POCT Blood Glucose.: 208 mg/dL (03-25-20 @ 13:43)  POCT Blood Glucose.: 318 mg/dL (03-25-20 @ 08:49)  POCT Blood Glucose.: 188 mg/dL (03-24-20 @ 22:10)  POCT Blood Glucose.: 72 mg/dL (03-24-20 @ 17:41)  POCT Blood Glucose.: 273 mg/dL (03-24-20 @ 13:19)  POCT Blood Glucose.: 217 mg/dL (03-23-20 @ 17:17)  POCT Blood Glucose.: 218 mg/dL (03-23-20 @ 12:23)  POCT Blood Glucose.: 283 mg/dL (03-23-20 @ 08:50)  POCT Blood Glucose.: 198 mg/dL (03-22-20 @ 17:40)      Thyroid Function Tests:  03-20 @ 11:43 TSH -- FreeT4 2.3 T3 69 Anti TPO -- Anti Thyroglobulin Ab -- TSI --  03-19 @ 13:39 TSH -- FreeT4 2.3 T3 67 Anti TPO -- Anti Thyroglobulin Ab -- TSI --      Hemoglobin A1C, Whole Blood: 9.0 % <H> [4.0 - 5.6] (01-28-20 @ 19:30)  Hemoglobin A1C, Whole Blood: 9.2 % <H> [4.0 - 5.6] (01-27-20 @ 17:36)

## 2020-03-25 NOTE — PROGRESS NOTE ADULT - SUBJECTIVE AND OBJECTIVE BOX
[facility] Division of Hospital Medicine  Fe Rodriguez DO  Pager (M-F, 1R-6R): [phone]  Other Times:  [phone]    Patient is a 64y old  Female who presents with a chief complaint of change in mental status (24 Mar 2020 18:37)      SUBJECTIVE / OVERNIGHT EVENTS:    patient has told me to leave the room.    MEDICATIONS  (STANDING):  budesonide  80 MICROgram(s)/formoterol 4.5 MICROgram(s) Inhaler 2 Puff(s) Inhalation two times a day  dextrose 5%. 1000 milliLiter(s) (50 mL/Hr) IV Continuous <Continuous>  dextrose 50% Injectable 12.5 Gram(s) IV Push once  dextrose 50% Injectable 25 Gram(s) IV Push once  dextrose 50% Injectable 25 Gram(s) IV Push once  heparin  Injectable 5000 Unit(s) SubCutaneous every 8 hours  influenza   Vaccine 0.5 milliLiter(s) IntraMuscular once  insulin glargine Injectable (LANTUS) 30 Unit(s) SubCutaneous every morning  insulin lispro (HumaLOG) corrective regimen sliding scale   SubCutaneous three times a day before meals  insulin lispro (HumaLOG) corrective regimen sliding scale   SubCutaneous at bedtime  insulin lispro Injectable (HumaLOG) 16 Unit(s) SubCutaneous before breakfast  insulin lispro Injectable (HumaLOG) 14 Unit(s) SubCutaneous before lunch  insulin lispro Injectable (HumaLOG) 10 Unit(s) SubCutaneous before dinner  levothyroxine 150 MICROGram(s) Oral daily  losartan 25 milliGRAM(s) Oral daily  metoprolol tartrate 25 milliGRAM(s) Oral two times a day  multivitamin/minerals/iron Oral Solution (CENTRUM) 15 milliLiter(s) Oral daily  nystatin Powder 1 Application(s) Topical every 12 hours  OLANZapine 2.5 milliGRAM(s) Oral two times a day  pantoprazole    Tablet 40 milliGRAM(s) Oral <User Schedule>  predniSONE   Tablet 15 milliGRAM(s) Oral daily  primidone 50 milliGRAM(s) Oral at bedtime  simvastatin 20 milliGRAM(s) Oral at bedtime  thiamine 100 milliGRAM(s) Oral daily    MEDICATIONS  (PRN):  acetaminophen   Tablet .. 975 milliGRAM(s) Oral every 6 hours PRN Moderate Pain (4 - 6), Severe Pain (7 - 10)  acetaminophen  Suppository .. 650 milliGRAM(s) Rectal every 6 hours PRN Temp greater or equal to 38C (100.4F)  albuterol/ipratropium for Nebulization 3 milliLiter(s) Nebulizer every 6 hours PRN Shortness of Breath and/or Wheezing  dextrose 40% Gel 15 Gram(s) Oral once PRN Blood Glucose LESS THAN 70 milliGRAM(s)/deciLiter  glucagon  Injectable 1 milliGRAM(s) IntraMuscular once PRN Glucose <70 milliGRAM(s)/deciLiter  melatonin 3 milliGRAM(s) Oral at bedtime PRN Insomnia      CAPILLARY BLOOD GLUCOSE      POCT Blood Glucose.: 318 mg/dL (25 Mar 2020 08:49)  POCT Blood Glucose.: 188 mg/dL (24 Mar 2020 22:10)  POCT Blood Glucose.: 72 mg/dL (24 Mar 2020 17:41)  POCT Blood Glucose.: 273 mg/dL (24 Mar 2020 13:19)    I&O's Summary    24 Mar 2020 07:01  -  25 Mar 2020 07:00  --------------------------------------------------------  IN: 340 mL / OUT: 0 mL / NET: 340 mL        PHYSICAL EXAM:  Vital Signs Last 24 Hrs  T(C): 36.8 (24 Mar 2020 18:32), Max: 36.8 (24 Mar 2020 18:32)  T(F): 98.3 (24 Mar 2020 18:32), Max: 98.3 (24 Mar 2020 18:32)  HR: 96 (25 Mar 2020 06:10) (96 - 100)  BP: 98/69 (25 Mar 2020 06:10) (98/69 - 117/74)  BP(mean): --  RR: 18 (25 Mar 2020 06:10) (18 - 18)  SpO2: 98% (24 Mar 2020 18:32) (98% - 98%)    patient deferred physical exam     LABS:                      RADIOLOGY & ADDITIONAL TESTS:  Results Reviewed:   Imaging Personally Reviewed:  Electrocardiogram Personally Reviewed:    COORDINATION OF CARE:  Care Discussed with Consultants/Other Providers [Y/N]:  Prior or Outpatient Records Reviewed [Y/N]:  KEELEY Murdock

## 2020-03-25 NOTE — PROGRESS NOTE ADULT - PROBLEM SELECTOR PLAN 1
-Test BG ac and hs  -C/w Lantus dose 30 units  q am  -C/w Humalog to 16-14-10 ac meals. MAKE SURE PT EATS  -C/w Humalog moderate correction scales ac and hs  -Will follow up BG levels and adjust as needed  -Discharge plan is basal/bolus regimen. Pt likely going to rehab.

## 2020-03-25 NOTE — PROGRESS NOTE ADULT - PROBLEM SELECTOR PLAN 2
-T4 low x 2 days Levothyroxine changed to 150mcg daily   -Recheck Free T4 in 1 week 3/31/20   Contact info: [phone] (24/7). pager [phone]      Due to [facility] policy during the evolving novel coronavirus outbreak, efforts are being made to limit unnecessary patient contact to limit the spread of the disease.  Accordingly, patients without clear indication for physical exam or face to face interview from the endocrine team insulin regimen will be adjusted in conjunction with conversation with primary provider team, This is being done for the safety of all the patients we care for.    For any questions please contact team:  Cell: [phone] today 3/23/20 -- Free T4 high x 2 days Levothyroxine changed to 150mcg daily Levothyroxine changed to 150mcg daily   -Recheck Free T4 in 1 week 3/31/20   Contact info: [phone] (24/7). pager [phone]      Due to [facility] policy during the evolving novel coronavirus outbreak, efforts are being made to limit unnecessary patient contact to limit the spread of the disease.  Accordingly, patients without clear indication for physical exam or face to face interview from the endocrine team insulin regimen will be adjusted in conjunction with conversation with primary provider team, This is being done for the safety of all the patients we care for.    For any questions please contact team:  Cell: [phone] today 3/23/20

## 2020-03-25 NOTE — PROGRESS NOTE ADULT - ASSESSMENT
63 y/o F w/h/o uncontrolled T2DM with unknown complications. Also h/o functional paraplegia, hypothyroidism, pulmonary hypertension, COPD, RA on chronic prednisone, SBO s/p ex lap with lysis of adhesions c/b multifocal pneumonia with AMS of unknown etiology. Pt was on catatonic state that resolved with ativan administration and is alert but still confused and disoriented. Refusing meds/treatments on and off. Glycemic control remains variable. Noted fasting hyperglycemia today due to the fact that pt refused Lantus yesterday. Received higher Lantus dose this am for the first time so will need to wait for next 24 hours and reassess for further insulin requirements. No hypoglycemia. Took Prednisone and Synthroid today.

## 2020-03-26 LAB
GLUCOSE BLDC GLUCOMTR-MCNC: 148 MG/DL — HIGH (ref 70–99)
GLUCOSE BLDC GLUCOMTR-MCNC: 242 MG/DL — HIGH (ref 70–99)
GLUCOSE BLDC GLUCOMTR-MCNC: 270 MG/DL — HIGH (ref 70–99)
GLUCOSE BLDC GLUCOMTR-MCNC: 97 MG/DL — SIGNIFICANT CHANGE UP (ref 70–99)

## 2020-03-26 PROCEDURE — 99232 SBSQ HOSP IP/OBS MODERATE 35: CPT | Mod: GC

## 2020-03-26 PROCEDURE — 99239 HOSP IP/OBS DSCHRG MGMT >30: CPT

## 2020-03-26 RX ORDER — INSULIN LISPRO 100/ML
18 VIAL (ML) SUBCUTANEOUS
Refills: 0 | Status: DISCONTINUED | OUTPATIENT
Start: 2020-03-26 | End: 2020-03-27

## 2020-03-26 RX ORDER — INSULIN GLARGINE 100 [IU]/ML
34 INJECTION, SOLUTION SUBCUTANEOUS EVERY MORNING
Refills: 0 | Status: DISCONTINUED | OUTPATIENT
Start: 2020-03-26 | End: 2020-03-27

## 2020-03-26 RX ADMIN — SIMVASTATIN 20 MILLIGRAM(S): 20 TABLET, FILM COATED ORAL at 21:44

## 2020-03-26 RX ADMIN — OLANZAPINE 2.5 MILLIGRAM(S): 15 TABLET, FILM COATED ORAL at 17:55

## 2020-03-26 RX ADMIN — Medication 16 UNIT(S): at 09:07

## 2020-03-26 RX ADMIN — BUDESONIDE AND FORMOTEROL FUMARATE DIHYDRATE 2 PUFF(S): 160; 4.5 AEROSOL RESPIRATORY (INHALATION) at 17:54

## 2020-03-26 RX ADMIN — Medication 6: at 09:07

## 2020-03-26 RX ADMIN — Medication 4: at 12:46

## 2020-03-26 RX ADMIN — HEPARIN SODIUM 5000 UNIT(S): 5000 INJECTION INTRAVENOUS; SUBCUTANEOUS at 13:36

## 2020-03-26 RX ADMIN — Medication 0: at 17:56

## 2020-03-26 RX ADMIN — Medication 975 MILLIGRAM(S): at 19:38

## 2020-03-26 RX ADMIN — Medication 25 MILLIGRAM(S): at 17:54

## 2020-03-26 RX ADMIN — HEPARIN SODIUM 5000 UNIT(S): 5000 INJECTION INTRAVENOUS; SUBCUTANEOUS at 21:44

## 2020-03-26 RX ADMIN — Medication 15 MILLIGRAM(S): at 05:54

## 2020-03-26 RX ADMIN — NYSTATIN CREAM 1 APPLICATION(S): 100000 CREAM TOPICAL at 18:03

## 2020-03-26 RX ADMIN — PRIMIDONE 50 MILLIGRAM(S): 250 TABLET ORAL at 21:44

## 2020-03-26 RX ADMIN — Medication 15 MILLILITER(S): at 11:45

## 2020-03-26 RX ADMIN — INSULIN GLARGINE 30 UNIT(S): 100 INJECTION, SOLUTION SUBCUTANEOUS at 09:13

## 2020-03-26 RX ADMIN — PANTOPRAZOLE SODIUM 40 MILLIGRAM(S): 20 TABLET, DELAYED RELEASE ORAL at 11:45

## 2020-03-26 RX ADMIN — HEPARIN SODIUM 5000 UNIT(S): 5000 INJECTION INTRAVENOUS; SUBCUTANEOUS at 05:53

## 2020-03-26 RX ADMIN — BUDESONIDE AND FORMOTEROL FUMARATE DIHYDRATE 2 PUFF(S): 160; 4.5 AEROSOL RESPIRATORY (INHALATION) at 05:53

## 2020-03-26 RX ADMIN — Medication 25 MILLIGRAM(S): at 05:54

## 2020-03-26 RX ADMIN — OLANZAPINE 2.5 MILLIGRAM(S): 15 TABLET, FILM COATED ORAL at 05:54

## 2020-03-26 RX ADMIN — Medication 14 UNIT(S): at 12:48

## 2020-03-26 RX ADMIN — Medication 975 MILLIGRAM(S): at 20:35

## 2020-03-26 RX ADMIN — Medication 100 MILLIGRAM(S): at 11:45

## 2020-03-26 RX ADMIN — LOSARTAN POTASSIUM 25 MILLIGRAM(S): 100 TABLET, FILM COATED ORAL at 05:54

## 2020-03-26 RX ADMIN — Medication 150 MICROGRAM(S): at 05:54

## 2020-03-26 NOTE — PROGRESS NOTE ADULT - SUBJECTIVE AND OBJECTIVE BOX
Chief Complaint: hyperglycemia in patient with DM2     History: Patient seen at bedside. Upset therefore did not want to discuss diet and any symptoms     MEDICATIONS  (STANDING):  budesonide  80 MICROgram(s)/formoterol 4.5 MICROgram(s) Inhaler 2 Puff(s) Inhalation two times a day  dextrose 5%. 1000 milliLiter(s) (50 mL/Hr) IV Continuous <Continuous>  dextrose 50% Injectable 12.5 Gram(s) IV Push once  dextrose 50% Injectable 25 Gram(s) IV Push once  dextrose 50% Injectable 25 Gram(s) IV Push once  heparin  Injectable 5000 Unit(s) SubCutaneous every 8 hours  influenza   Vaccine 0.5 milliLiter(s) IntraMuscular once  insulin glargine Injectable (LANTUS) 30 Unit(s) SubCutaneous every morning  insulin lispro (HumaLOG) corrective regimen sliding scale   SubCutaneous three times a day before meals  insulin lispro (HumaLOG) corrective regimen sliding scale   SubCutaneous at bedtime  insulin lispro Injectable (HumaLOG) 16 Unit(s) SubCutaneous before breakfast  insulin lispro Injectable (HumaLOG) 14 Unit(s) SubCutaneous before lunch  insulin lispro Injectable (HumaLOG) 10 Unit(s) SubCutaneous before dinner  levothyroxine 150 MICROGram(s) Oral daily  losartan 25 milliGRAM(s) Oral daily  metoprolol tartrate 25 milliGRAM(s) Oral two times a day  multivitamin/minerals/iron Oral Solution (CENTRUM) 15 milliLiter(s) Oral daily  nystatin Powder 1 Application(s) Topical every 12 hours  OLANZapine 2.5 milliGRAM(s) Oral two times a day  pantoprazole    Tablet 40 milliGRAM(s) Oral <User Schedule>  predniSONE   Tablet 15 milliGRAM(s) Oral daily  primidone 50 milliGRAM(s) Oral at bedtime  simvastatin 20 milliGRAM(s) Oral at bedtime  thiamine 100 milliGRAM(s) Oral daily    MEDICATIONS  (PRN):  acetaminophen   Tablet .. 975 milliGRAM(s) Oral every 6 hours PRN Moderate Pain (4 - 6), Severe Pain (7 - 10)  acetaminophen  Suppository .. 650 milliGRAM(s) Rectal every 6 hours PRN Temp greater or equal to 38C (100.4F)  albuterol/ipratropium for Nebulization 3 milliLiter(s) Nebulizer every 6 hours PRN Shortness of Breath and/or Wheezing  dextrose 40% Gel 15 Gram(s) Oral once PRN Blood Glucose LESS THAN 70 milliGRAM(s)/deciLiter  glucagon  Injectable 1 milliGRAM(s) IntraMuscular once PRN Glucose <70 milliGRAM(s)/deciLiter  melatonin 3 milliGRAM(s) Oral at bedtime PRN Insomnia      PHYSICAL EXAM:  VITALS: T(C): 36.9 (03-26-20 @ 05:33)  T(F): 98.5 (03-26-20 @ 05:33), Max: 99.5 (03-25-20 @ 22:01)  HR: 101 (03-26-20 @ 05:33) (100 - 111)  BP: 126/89 (03-26-20 @ 05:33) (126/89 - 161/95)  RR:  (18 - 18)  SpO2:  (93% - 97%)  Wt(kg): 94kg   refused physical exam    POCT Blood Glucose.: 242 mg/dL (03-26-20 @ 12:44) H14 +2  POCT Blood Glucose.: 270 mg/dL (03-26-20 @ 09:01) H16+ 6  POCT Blood Glucose.: 208 mg/dL (03-25-20 @ 13:43) H14 +4   POCT Blood Glucose.: 318 mg/dL (03-25-20 @ 08:49) H 16+8  POCT Blood Glucose.: 188 mg/dL (03-24-20 @ 22:10)   POCT Blood Glucose.: 72 mg/dL (03-24-20 @ 17:41)    POCT Blood Glucose.: 273 mg/dL (03-24-20 @ 13:19)   POCT Blood Glucose.: 217 mg/dL (03-23-20 @ 17:17)       Thyroid Function Tests:   03-20 @ 11:43 TSH -- FreeT4 2.3 T3 69 Anti TPO -- Anti Thyroglobulin Ab -- TSI --  03-19 @ 13:39 TSH -- FreeT4 2.3 T3 67 Anti TPO -- Anti Thyroglobulin Ab -- TSI --      Hemoglobin A1C, Whole Blood: 9.0 % <H> [4.0 - 5.6] (01-28-20 @ 19:30)  Hemoglobin A1C, Whole Blood: 9.2 % <H> [4.0 - 5.6] (01-27-20 @ 17:36)

## 2020-03-26 NOTE — PROGRESS NOTE ADULT - REASON FOR ADMISSION
change in mental status

## 2020-03-26 NOTE — PROGRESS NOTE ADULT - PROBLEM SELECTOR PROBLEM 10
Discharge planning issues

## 2020-03-26 NOTE — PROGRESS NOTE ADULT - PROBLEM SELECTOR PLAN 1
- goal -180  - FS premeal and qhs  -Increase Lantus to 32 units  q am  -C/w Humalog to 16-14-10 ac meals. MAKE SURE PT EATS  -C/w Humalog moderate correction scales ac and hs  -Will follow up BG levels and adjust as needed  -Discharge plan is basal/bolus regimen. Pt likely going to rehab. - goal -180  - FS premeal and qhs  -Increase Lantus to 32 units  q am  -Increase Humalog at breakfast to 18. C/w Humalog 14 at lunch and 10 at dinner. MAKE SURE PT EATS  -C/w Humalog moderate correction scales ac and hs  -Will follow up BG levels and adjust as needed  -Discharge plan is basal/bolus regimen. Pt likely going to rehab.

## 2020-03-26 NOTE — PROGRESS NOTE ADULT - SUBJECTIVE AND OBJECTIVE BOX
[facility] Division of Hospital Medicine  Fe Rodriguez DO  Pager (M-F, [phone]-[phone]): [phone]  Other Times:  [phone]    Patient is a 64y old  Female who presents with a chief complaint of change in mental status (25 Mar 2020 14:12)      SUBJECTIVE / OVERNIGHT EVENTS:    patient refuses to acknowledge me as her physician     MEDICATIONS  (STANDING):  budesonide  80 MICROgram(s)/formoterol 4.5 MICROgram(s) Inhaler 2 Puff(s) Inhalation two times a day  dextrose 5%. 1000 milliLiter(s) (50 mL/Hr) IV Continuous <Continuous>  dextrose 50% Injectable 12.5 Gram(s) IV Push once  dextrose 50% Injectable 25 Gram(s) IV Push once  dextrose 50% Injectable 25 Gram(s) IV Push once  heparin  Injectable 5000 Unit(s) SubCutaneous every 8 hours  influenza   Vaccine 0.5 milliLiter(s) IntraMuscular once  insulin glargine Injectable (LANTUS) 30 Unit(s) SubCutaneous every morning  insulin lispro (HumaLOG) corrective regimen sliding scale   SubCutaneous three times a day before meals  insulin lispro (HumaLOG) corrective regimen sliding scale   SubCutaneous at bedtime  insulin lispro Injectable (HumaLOG) 16 Unit(s) SubCutaneous before breakfast  insulin lispro Injectable (HumaLOG) 14 Unit(s) SubCutaneous before lunch  insulin lispro Injectable (HumaLOG) 10 Unit(s) SubCutaneous before dinner  levothyroxine 150 MICROGram(s) Oral daily  losartan 25 milliGRAM(s) Oral daily  metoprolol tartrate 25 milliGRAM(s) Oral two times a day  multivitamin/minerals/iron Oral Solution (CENTRUM) 15 milliLiter(s) Oral daily  nystatin Powder 1 Application(s) Topical every 12 hours  OLANZapine 2.5 milliGRAM(s) Oral two times a day  pantoprazole    Tablet 40 milliGRAM(s) Oral <User Schedule>  predniSONE   Tablet 15 milliGRAM(s) Oral daily  primidone 50 milliGRAM(s) Oral at bedtime  simvastatin 20 milliGRAM(s) Oral at bedtime  thiamine 100 milliGRAM(s) Oral daily    MEDICATIONS  (PRN):  acetaminophen   Tablet .. 975 milliGRAM(s) Oral every 6 hours PRN Moderate Pain (4 - 6), Severe Pain (7 - 10)  acetaminophen  Suppository .. 650 milliGRAM(s) Rectal every 6 hours PRN Temp greater or equal to 38C (100.4F)  albuterol/ipratropium for Nebulization 3 milliLiter(s) Nebulizer every 6 hours PRN Shortness of Breath and/or Wheezing  dextrose 40% Gel 15 Gram(s) Oral once PRN Blood Glucose LESS THAN 70 milliGRAM(s)/deciLiter  glucagon  Injectable 1 milliGRAM(s) IntraMuscular once PRN Glucose <70 milliGRAM(s)/deciLiter  melatonin 3 milliGRAM(s) Oral at bedtime PRN Insomnia      CAPILLARY BLOOD GLUCOSE      POCT Blood Glucose.: 270 mg/dL (26 Mar 2020 09:01)  POCT Blood Glucose.: 208 mg/dL (25 Mar 2020 13:43)    I&O's Summary    25 Mar 2020 07:01  -  26 Mar 2020 07:00  --------------------------------------------------------  IN: 480 mL / OUT: 0 mL / NET: 480 mL    26 Mar 2020 07:01  -  26 Mar 2020 12:35  --------------------------------------------------------  IN: 240 mL / OUT: 0 mL / NET: 240 mL        PHYSICAL EXAM:  Vital Signs Last 24 Hrs  T(C): 36.9 (26 Mar 2020 05:33), Max: 37.5 (25 Mar 2020 22:01)  T(F): 98.5 (26 Mar 2020 05:33), Max: 99.5 (25 Mar 2020 22:01)  HR: 101 (26 Mar 2020 05:33) (100 - 111)  BP: 126/89 (26 Mar 2020 05:33) (126/89 - 161/95)  BP(mean): --  RR: 18 (26 Mar 2020 05:33) (18 - 18)  SpO2: 97% (26 Mar 2020 05:33) (93% - 97%)    defers physical exam    LABS:                      RADIOLOGY & ADDITIONAL TESTS:  Results Reviewed:   Imaging Personally Reviewed:  Electrocardiogram Personally Reviewed:    COORDINATION OF CARE:  Care Discussed with Consultants/Other Providers [Y/N]:  Prior or Outpatient Records Reviewed [Y/N]:

## 2020-03-26 NOTE — PROGRESS NOTE ADULT - PROBLEM SELECTOR PLAN 3
#DKA- resolved  - continue with moderate ISS  - lantus 30U qdaily  - 16 units humalog for breakfast.  - 14 units humalog for lunch   - 10 units humalog for dinner.   - endocrine team following.  - Discharge plan per endocrine  - If patient refuses insulin, not to be fed

## 2020-03-26 NOTE — PROGRESS NOTE ADULT - ATTENDING COMMENTS
Agree with assessment and plan as above by Dr. Baum. Modifications made as indicated above. Reviewed all pertinent labs and imaging studies personally. Increase Lantus to 34. Increase Humalog with breakfast to 18. Monitor on the rest of her regimen for now.

## 2020-03-27 VITALS
OXYGEN SATURATION: 95 % | RESPIRATION RATE: 18 BRPM | DIASTOLIC BLOOD PRESSURE: 57 MMHG | HEART RATE: 99 BPM | SYSTOLIC BLOOD PRESSURE: 106 MMHG

## 2020-03-27 LAB — GLUCOSE BLDC GLUCOMTR-MCNC: 205 MG/DL — HIGH (ref 70–99)

## 2020-03-27 PROCEDURE — 85610 PROTHROMBIN TIME: CPT

## 2020-03-27 PROCEDURE — 87483 CNS DNA AMP PROBE TYPE 12-25: CPT

## 2020-03-27 PROCEDURE — 84100 ASSAY OF PHOSPHORUS: CPT

## 2020-03-27 PROCEDURE — 85652 RBC SED RATE AUTOMATED: CPT

## 2020-03-27 PROCEDURE — 86706 HEP B SURFACE ANTIBODY: CPT

## 2020-03-27 PROCEDURE — 97110 THERAPEUTIC EXERCISES: CPT

## 2020-03-27 PROCEDURE — 81001 URINALYSIS AUTO W/SCOPE: CPT

## 2020-03-27 PROCEDURE — 87581 M.PNEUMON DNA AMP PROBE: CPT

## 2020-03-27 PROCEDURE — 70498 CT ANGIOGRAPHY NECK: CPT

## 2020-03-27 PROCEDURE — 74177 CT ABD & PELVIS W/CONTRAST: CPT

## 2020-03-27 PROCEDURE — 82140 ASSAY OF AMMONIA: CPT

## 2020-03-27 PROCEDURE — 86235 NUCLEAR ANTIGEN ANTIBODY: CPT

## 2020-03-27 PROCEDURE — 86900 BLOOD TYPING SEROLOGIC ABO: CPT

## 2020-03-27 PROCEDURE — 85014 HEMATOCRIT: CPT

## 2020-03-27 PROCEDURE — 87070 CULTURE OTHR SPECIMN AEROBIC: CPT

## 2020-03-27 PROCEDURE — 83605 ASSAY OF LACTIC ACID: CPT

## 2020-03-27 PROCEDURE — 86789 WEST NILE VIRUS ANTIBODY: CPT

## 2020-03-27 PROCEDURE — 84436 ASSAY OF TOTAL THYROXINE: CPT

## 2020-03-27 PROCEDURE — 82550 ASSAY OF CK (CPK): CPT

## 2020-03-27 PROCEDURE — 83615 LACTATE (LD) (LDH) ENZYME: CPT

## 2020-03-27 PROCEDURE — 80053 COMPREHEN METABOLIC PANEL: CPT

## 2020-03-27 PROCEDURE — 94640 AIRWAY INHALATION TREATMENT: CPT

## 2020-03-27 PROCEDURE — 92610 EVALUATE SWALLOWING FUNCTION: CPT

## 2020-03-27 PROCEDURE — 70546 MR ANGIOGRAPH HEAD W/O&W/DYE: CPT

## 2020-03-27 PROCEDURE — 82435 ASSAY OF BLOOD CHLORIDE: CPT

## 2020-03-27 PROCEDURE — 83655 ASSAY OF LEAD: CPT

## 2020-03-27 PROCEDURE — 85730 THROMBOPLASTIN TIME PARTIAL: CPT

## 2020-03-27 PROCEDURE — 87449 NOS EACH ORGANISM AG IA: CPT

## 2020-03-27 PROCEDURE — 94660 CPAP INITIATION&MGMT: CPT

## 2020-03-27 PROCEDURE — 87340 HEPATITIS B SURFACE AG IA: CPT

## 2020-03-27 PROCEDURE — 87633 RESP VIRUS 12-25 TARGETS: CPT

## 2020-03-27 PROCEDURE — 87476 LYME DIS DNA AMP PROBE: CPT

## 2020-03-27 PROCEDURE — 87486 CHLMYD PNEUM DNA AMP PROBE: CPT

## 2020-03-27 PROCEDURE — 80165 DIPROPYLACETIC ACID FREE: CPT

## 2020-03-27 PROCEDURE — 86803 HEPATITIS C AB TEST: CPT

## 2020-03-27 PROCEDURE — 82962 GLUCOSE BLOOD TEST: CPT

## 2020-03-27 PROCEDURE — 84157 ASSAY OF PROTEIN OTHER: CPT

## 2020-03-27 PROCEDURE — 86403 PARTICLE AGGLUT ANTBDY SCRN: CPT

## 2020-03-27 PROCEDURE — 82330 ASSAY OF CALCIUM: CPT

## 2020-03-27 PROCEDURE — 84439 ASSAY OF FREE THYROXINE: CPT

## 2020-03-27 PROCEDURE — 86255 FLUORESCENT ANTIBODY SCREEN: CPT

## 2020-03-27 PROCEDURE — 86038 ANTINUCLEAR ANTIBODIES: CPT

## 2020-03-27 PROCEDURE — 80164 ASSAY DIPROPYLACETIC ACD TOT: CPT

## 2020-03-27 PROCEDURE — 88185 FLOWCYTOMETRY/TC ADD-ON: CPT

## 2020-03-27 PROCEDURE — 85027 COMPLETE CBC AUTOMATED: CPT

## 2020-03-27 PROCEDURE — 86850 RBC ANTIBODY SCREEN: CPT

## 2020-03-27 PROCEDURE — 89051 BODY FLUID CELL COUNT: CPT

## 2020-03-27 PROCEDURE — 96372 THER/PROPH/DIAG INJ SC/IM: CPT

## 2020-03-27 PROCEDURE — 99285 EMERGENCY DEPT VISIT HI MDM: CPT | Mod: 25

## 2020-03-27 PROCEDURE — 88184 FLOWCYTOMETRY/ TC 1 MARKER: CPT

## 2020-03-27 PROCEDURE — 86225 DNA ANTIBODY NATIVE: CPT

## 2020-03-27 PROCEDURE — 86592 SYPHILIS TEST NON-TREP QUAL: CPT

## 2020-03-27 PROCEDURE — 84132 ASSAY OF SERUM POTASSIUM: CPT

## 2020-03-27 PROCEDURE — 83735 ASSAY OF MAGNESIUM: CPT

## 2020-03-27 PROCEDURE — 84295 ASSAY OF SERUM SODIUM: CPT

## 2020-03-27 PROCEDURE — 82010 KETONE BODYS QUAN: CPT

## 2020-03-27 PROCEDURE — 70496 CT ANGIOGRAPHY HEAD: CPT

## 2020-03-27 PROCEDURE — 86780 TREPONEMA PALLIDUM: CPT

## 2020-03-27 PROCEDURE — 86788 WEST NILE VIRUS AB IGM: CPT

## 2020-03-27 PROCEDURE — 82390 ASSAY OF CERULOPLASMIN: CPT

## 2020-03-27 PROCEDURE — 86376 MICROSOMAL ANTIBODY EACH: CPT

## 2020-03-27 PROCEDURE — 84480 ASSAY TRIIODOTHYRONINE (T3): CPT

## 2020-03-27 PROCEDURE — 82728 ASSAY OF FERRITIN: CPT

## 2020-03-27 PROCEDURE — 86704 HEP B CORE ANTIBODY TOTAL: CPT

## 2020-03-27 PROCEDURE — 70549 MR ANGIOGRAPH NECK W/O&W/DYE: CPT

## 2020-03-27 PROCEDURE — 82746 ASSAY OF FOLIC ACID SERUM: CPT

## 2020-03-27 PROCEDURE — 93005 ELECTROCARDIOGRAM TRACING: CPT

## 2020-03-27 PROCEDURE — 87186 SC STD MICRODIL/AGAR DIL: CPT

## 2020-03-27 PROCEDURE — 95714 VEEG EA 12-26 HR UNMNTR: CPT

## 2020-03-27 PROCEDURE — 83540 ASSAY OF IRON: CPT

## 2020-03-27 PROCEDURE — 80061 LIPID PANEL: CPT

## 2020-03-27 PROCEDURE — 84443 ASSAY THYROID STIM HORMONE: CPT

## 2020-03-27 PROCEDURE — 36600 WITHDRAWAL OF ARTERIAL BLOOD: CPT

## 2020-03-27 PROCEDURE — 86431 RHEUMATOID FACTOR QUANT: CPT

## 2020-03-27 PROCEDURE — 82945 GLUCOSE OTHER FLUID: CPT

## 2020-03-27 PROCEDURE — 84481 FREE ASSAY (FT-3): CPT

## 2020-03-27 PROCEDURE — 87798 DETECT AGENT NOS DNA AMP: CPT

## 2020-03-27 PROCEDURE — 71045 X-RAY EXAM CHEST 1 VIEW: CPT

## 2020-03-27 PROCEDURE — 93306 TTE W/DOPPLER COMPLETE: CPT

## 2020-03-27 PROCEDURE — 97162 PT EVAL MOD COMPLEX 30 MIN: CPT

## 2020-03-27 PROCEDURE — 82947 ASSAY GLUCOSE BLOOD QUANT: CPT

## 2020-03-27 PROCEDURE — 87040 BLOOD CULTURE FOR BACTERIA: CPT

## 2020-03-27 PROCEDURE — 87086 URINE CULTURE/COLONY COUNT: CPT

## 2020-03-27 PROCEDURE — 84146 ASSAY OF PROLACTIN: CPT

## 2020-03-27 PROCEDURE — 95700 EEG CONT REC W/VID EEG TECH: CPT

## 2020-03-27 PROCEDURE — 86341 ISLET CELL ANTIBODY: CPT

## 2020-03-27 PROCEDURE — 82306 VITAMIN D 25 HYDROXY: CPT

## 2020-03-27 PROCEDURE — 99231 SBSQ HOSP IP/OBS SF/LOW 25: CPT

## 2020-03-27 PROCEDURE — 86901 BLOOD TYPING SEROLOGIC RH(D): CPT

## 2020-03-27 PROCEDURE — 70450 CT HEAD/BRAIN W/O DYE: CPT

## 2020-03-27 PROCEDURE — 84484 ASSAY OF TROPONIN QUANT: CPT

## 2020-03-27 PROCEDURE — 82607 VITAMIN B-12: CPT

## 2020-03-27 PROCEDURE — 83520 IMMUNOASSAY QUANT NOS NONAB: CPT

## 2020-03-27 PROCEDURE — 83036 HEMOGLOBIN GLYCOSYLATED A1C: CPT

## 2020-03-27 PROCEDURE — 83550 IRON BINDING TEST: CPT

## 2020-03-27 PROCEDURE — 87529 HSV DNA AMP PROBE: CPT

## 2020-03-27 PROCEDURE — 80048 BASIC METABOLIC PNL TOTAL CA: CPT

## 2020-03-27 PROCEDURE — 87102 FUNGUS ISOLATION CULTURE: CPT

## 2020-03-27 PROCEDURE — 80307 DRUG TEST PRSMV CHEM ANLYZR: CPT

## 2020-03-27 PROCEDURE — 86140 C-REACTIVE PROTEIN: CPT

## 2020-03-27 PROCEDURE — 97530 THERAPEUTIC ACTIVITIES: CPT

## 2020-03-27 PROCEDURE — A9585: CPT

## 2020-03-27 PROCEDURE — 82803 BLOOD GASES ANY COMBINATION: CPT

## 2020-03-27 PROCEDURE — 84145 PROCALCITONIN (PCT): CPT

## 2020-03-27 PROCEDURE — 95816 EEG AWAKE AND DROWSY: CPT

## 2020-03-27 PROCEDURE — 87205 SMEAR GRAM STAIN: CPT

## 2020-03-27 PROCEDURE — 70552 MRI BRAIN STEM W/DYE: CPT

## 2020-03-27 PROCEDURE — 87389 HIV-1 AG W/HIV-1&-2 AB AG IA: CPT

## 2020-03-27 RX ORDER — ENOXAPARIN SODIUM 100 MG/ML
34 INJECTION SUBCUTANEOUS
Qty: 1 | Refills: 0
Start: 2020-03-27 | End: 2020-04-25

## 2020-03-27 RX ORDER — INSULIN LISPRO 100/ML
10 VIAL (ML) SUBCUTANEOUS
Qty: 1 | Refills: 0
Start: 2020-03-27

## 2020-03-27 RX ORDER — INSULIN LISPRO 100/ML
1 VIAL (ML) SUBCUTANEOUS
Qty: 0 | Refills: 0 | DISCHARGE

## 2020-03-27 RX ADMIN — Medication 25 MILLIGRAM(S): at 06:38

## 2020-03-27 RX ADMIN — LOSARTAN POTASSIUM 25 MILLIGRAM(S): 100 TABLET, FILM COATED ORAL at 06:39

## 2020-03-27 RX ADMIN — OLANZAPINE 2.5 MILLIGRAM(S): 15 TABLET, FILM COATED ORAL at 06:37

## 2020-03-27 RX ADMIN — Medication 15 MILLIGRAM(S): at 06:35

## 2020-03-27 RX ADMIN — Medication 4: at 08:48

## 2020-03-27 RX ADMIN — Medication 975 MILLIGRAM(S): at 07:36

## 2020-03-27 RX ADMIN — BUDESONIDE AND FORMOTEROL FUMARATE DIHYDRATE 2 PUFF(S): 160; 4.5 AEROSOL RESPIRATORY (INHALATION) at 06:37

## 2020-03-27 RX ADMIN — Medication 15 MILLILITER(S): at 11:30

## 2020-03-27 RX ADMIN — Medication 100 MILLIGRAM(S): at 11:30

## 2020-03-27 RX ADMIN — Medication 18 UNIT(S): at 08:50

## 2020-03-27 RX ADMIN — PANTOPRAZOLE SODIUM 40 MILLIGRAM(S): 20 TABLET, DELAYED RELEASE ORAL at 11:33

## 2020-03-27 RX ADMIN — Medication 150 MICROGRAM(S): at 06:36

## 2020-03-27 RX ADMIN — HEPARIN SODIUM 5000 UNIT(S): 5000 INJECTION INTRAVENOUS; SUBCUTANEOUS at 06:40

## 2020-03-27 RX ADMIN — INSULIN GLARGINE 34 UNIT(S): 100 INJECTION, SOLUTION SUBCUTANEOUS at 08:54

## 2020-03-27 NOTE — CHART NOTE - NSCHARTNOTEFT_GEN_A_CORE
Called Son Norris Pulling Reviewed Lantus and Humalog administration. Norris report patient was on these medications before and has medication and supplies. Reports competency in administration.    Reviewed Lantus 34 units subcutaneous daily in the AM                   Humalog 10 units with dinner                   Humalog 14 units with lunch and                   Humalog 18 units with dinner.    Reports he has follow up with Dr. Smith  with endocrinology and will keep a fingers stick log   acknowledges signs of hyperglycemia and hypoglycemia     Norris has no further questions at this time         Chantel Eastman NP  Internal Medicine

## 2020-03-28 NOTE — PROGRESS NOTE ADULT - ASSESSMENT
63 year old female with previous exploratory laparotomy, complicated by evisceration, presently admitted with respiratory failure; surgery consulted to assess wound and possible hernia.    - patient without obstructive symptoms, otherwise feeling well, no urgent surgical intervention warranted  - patient can follow up as outpatient for elective hernia repair    ACS Surgery  [phone] fall precautions

## 2020-03-30 NOTE — PROGRESS NOTE PEDS - PROBLEM SELECTOR PLAN 1
Pt is requesting a new nebulizer cord, says her cord is broken. Where would I direct pt to for this? Pt says she received the nebulizer from us approx. 2 yrs ago maybe more. - continue  IV lasix to po 40mg QD  - monitor I&O's  - BIPAP PRN  - TTE - showed moderate pulmonary hypertension.  -  CT chest noncon showed KARIME ground glass opacity   - On Levaquin PO day 3  - Pt should have FU OP CT Scan in 6 weeks   - Lower leg edema, left >right today, bilat doppler negative

## 2020-04-08 NOTE — PROGRESS NOTE ADULT - NEUROLOGICAL DETAILS
Afirma FNA    Non diagnostic    This is not surprising given that it was primarily cystic    Left message    Options:  1. Repeat biopsy  2.   Observation with f/u US in 6 months      I suspect this is benign    Yamini Jones MD FACS responds to verbal commands

## 2020-04-19 NOTE — PROGRESS NOTE ADULT - SUBJECTIVE AND OBJECTIVE BOX
[facility] INPATIENT ENCOUNTER  PULMONARY DAILY PROGRESS NOTE      Patient:  Cheryle L Pruitt Date:  2020   :  1955 Attending:  Wilver Drew MD   64 year old female    CURRENT HOSPITAL DAY:  Hospital Day: 11    Chief Complaint:  Acute hypoxemic respiratory failure    Other Active Problems:  Left lower lobe pneumonia  Bilateral pulmonary emboli  Chronic inflammatory demyelinating polyneuropathy  Basal ganglion stroke    Subjective:   She is interactive.  She denies shortness of breath.  When 7 hours on trach mask trial yesterday with plans for 10 hours today.  Better tolerance of trach mask over the last couple of days as she has been rested on assist control at night verses being on pressure support.      Reviewed Pertinent:  Laboratory studies, medications, and recent progress notes.    Review of Systems:  As above.  Difficult to fully obtain due to her ventilated status.    Physical Exam:   Temperature 97.4°, blood pressure 153/93, pulse 113, respiratory rate of 16.  General Appearance:  Alert, cooperative, no distress, appears stated age.  Appears comfortable off the ventilator.  Head:  Normocephalic, without obvious abnormality, atraumatic  Eyes:  Conjunctivae/corneas clear  Throat:  Lips, mucosa, normal; teeth and gums normal.  Some apparent thrush on the tongue.  Neck:  Supple, symmetrical, trachea midline, no jugular venous distention; no masses  Chest Wall:  No tenderness or deformity  Lungs:  Minimally coarse but with good full breath sounds on auscultation anteriorly and laterally, respirations unlabored, no dullness to percussion, equal expansion, no accessory respiratory muscle use  Heart:  Regular rate and rhythm, S1 and S2 normal, no murmur, rub or gallop  Abdomen:  Soft, non-tender, bowel sounds active all four quadrants, no masses, no organomegaly  Extremities:  Normal, atraumatic, no cyanosis, clubbing or edema  Pulses:  2+ and symmetric  Skin:  Warm and dry, no rashes      Laboratory  Chief Complaint: Uncontrolled DM2    s: As per daughter, pt. less awake and responsive She has not had any PO intake today.    MEDICATIONS  (STANDING):  budesonide  80 MICROgram(s)/formoterol 4.5 MICROgram(s) Inhaler 2 Puff(s) Inhalation two times a day  dextrose 5%. 1000 milliLiter(s) (50 mL/Hr) IV Continuous <Continuous>  dextrose 50% Injectable 12.5 Gram(s) IV Push once  dextrose 50% Injectable 25 Gram(s) IV Push once  dextrose 50% Injectable 25 Gram(s) IV Push once  influenza   Vaccine 0.5 milliLiter(s) IntraMuscular once  insulin glargine Injectable (LANTUS) 20 Unit(s) SubCutaneous every morning  insulin lispro (HumaLOG) corrective regimen sliding scale   SubCutaneous three times a day before meals  insulin lispro (HumaLOG) corrective regimen sliding scale   SubCutaneous at bedtime  levothyroxine Injectable 87.5 MICROGram(s) IV Push at bedtime  methylPREDNISolone sodium succinate Injectable 12.5 milliGRAM(s) IV Push daily  metoprolol tartrate Injectable 2.5 milliGRAM(s) IV Push every 6 hours  pantoprazole  Injectable 40 milliGRAM(s) IV Push daily  primidone 50 milliGRAM(s) Oral at bedtime  simvastatin 20 milliGRAM(s) Oral at bedtime    MEDICATIONS  (PRN):  acetaminophen   Tablet .. 650 milliGRAM(s) Oral every 6 hours PRN Temp greater or equal to 38C (100.4F), Mild Pain (1 - 3), Moderate Pain (4 - 6)  albuterol/ipratropium for Nebulization 3 milliLiter(s) Nebulizer every 6 hours PRN Shortness of Breath and/or Wheezing  dextrose 40% Gel 15 Gram(s) Oral once PRN Blood Glucose LESS THAN 70 milliGRAM(s)/deciLiter  glucagon  Injectable 1 milliGRAM(s) IntraMuscular once PRN Glucose <70 milliGRAM(s)/deciLiter  haloperidol    Injectable 2 milliGRAM(s) IntraMuscular every 4 hours PRN Agitation  haloperidol    Injectable 2 milliGRAM(s) IV Push every 4 hours PRN Agitation  ketorolac   Injectable 15 milliGRAM(s) IV Push every 6 hours PRN Moderate Pain (4 - 6)  melatonin 3 milliGRAM(s) Oral at bedtime PRN Insomnia  morphine  - Injectable 4 milliGRAM(s) IV Push every 6 hours PRN Moderate Pain (4 - 6)      Allergies    Depakote (Other)    Intolerances    Seroquel (Other)    Review of Systems:  UNABLE TO OBTAIN    PHYSICAL EXAM:  VITALS: T(C): 37.1 (02-13-20 @ 11:58)  T(F): 98.8 (02-13-20 @ 11:58), Max: 98.8 (02-13-20 @ 11:58)  HR: 92 (02-13-20 @ 12:32) (89 - 102)  BP: 138/87 (02-13-20 @ 12:32) (119/62 - 160/90)  RR:  (18 - 20)  SpO2:  (97% - 100%)  Wt(kg): --  GENERAL: Lying in bed NAD  EYES: No proptosis,, anicteric  HEENT:  Atraumatic, Normocephalic  SKIN: Dry, intact, No rashes or lesions  PSYCH: lying in bed w/ eyes closed, not responsive      POCT Blood Glucose.: 198 mg/dL (02-13-20 @ 12:11)  POCT Blood Glucose.: 172 mg/dL (02-13-20 @ 07:50)  POCT Blood Glucose.: 140 mg/dL (02-12-20 @ 21:29)  POCT Blood Glucose.: 149 mg/dL (02-12-20 @ 17:45)  POCT Blood Glucose.: 208 mg/dL (02-12-20 @ 13:46)  POCT Blood Glucose.: 202 mg/dL (02-12-20 @ 10:50)  POCT Blood Glucose.: 105 mg/dL (02-12-20 @ 06:11)  POCT Blood Glucose.: 146 mg/dL (02-11-20 @ 21:41)  POCT Blood Glucose.: 170 mg/dL (02-11-20 @ 18:34)  POCT Blood Glucose.: 181 mg/dL (02-11-20 @ 13:18)  POCT Blood Glucose.: 142 mg/dL (02-11-20 @ 09:47)  POCT Blood Glucose.: 149 mg/dL (02-11-20 @ 01:48)  POCT Blood Glucose.: 216 mg/dL (02-10-20 @ 21:51)  POCT Blood Glucose.: 89 mg/dL (02-10-20 @ 14:55)  POCT Blood Glucose.: 69 mg/dL (02-10-20 @ 14:20)  POCT Blood Glucose.: 68 mg/dL (02-10-20 @ 14:18)      02-13    140  |  102  |  16  ----------------------------<  180<H>  4.7   |  20<L>  |  0.85    EGFR if : 85  EGFR if non : 73    Ca    9.9      02-13  Mg     2.4     02-13  Phos  3.1     02-13    TPro  8.0  /  Alb  4.1  /  TBili  0.1<L>  /  DBili  x   /  AST  17  /  ALT  18  /  AlkPhos  83  02-13          Thyroid Function Tests:  02-13 @ 09:05 TSH 17.80 FreeT4 -- T3 -- Anti TPO -- Anti Thyroglobulin Ab -- TSI --  02-01 @ 22:37 TSH 8.35 FreeT4 1.3 T3 -- Anti TPO -- Anti Thyroglobulin Ab -- TSI --      Hemoglobin A1C, Whole Blood: 9.0 % <H> [4.0 - 5.6] (01-28-20 @ 19:30)  Hemoglobin A1C, Whole Blood: 9.2 % <H> [4.0 - 5.6] (01-27-20 @ 17:36) Results:  Recent Labs   Lab 04/19/20  0522 04/18/20  0516 04/17/20  0718 04/16/20  0826 04/13/20  1015   SODIUM  --  145  --  139  --  139   POTASSIUM 3.7 3.5 4.3 2.6*   < > 2.4*   CHLORIDE  --  108*  --  101  --  100   CO2  --  31  --  31  --  31   BUN  --  32*  --  26*  --  34*   CREATININE  --  0.37*  --  0.48*  --  0.58   GLUCOSE  --  127*  --  116*  --  133*   ALBUMIN  --  2.3*  --   --   --   --    AST  --  26  --   --   --   --    BILIRUBIN  --  0.5  --   --   --   --     < > = values in this interval not displayed.     Recent Labs   Lab 04/18/20  0516 04/16/20  0826 04/13/20  1015   WBC 11.6* 15.4* 12.1*   HGB 9.8* 10.8* 11.0*   HCT 32.4* 34.7* 35.3*    281 271     INR (no units)   Date Value   03/29/2020 1.1       ABG  Lab Results   Component Value Date    APH 7.41 03/23/2020    APO2 53 (L) 03/23/2020    ASAT 93 (L) 08/18/2011    FIO2 21 08/18/2011    AHCO3 26 03/23/2020    APCO2 41 03/23/2020       Imaging:  None new.    Diagnosis:  1. Acute hypoxemic respiratory failure  2. Left lower lobe pneumonia  3. Bilateral pulmonary emboli  4. Chronic inflammatory demyelinating polyneuropathy  5. Basal ganglion stroke  6. Possible thrush.    Plans:  1. Continue to try to increase trach mask times.  2. Continue guaifenesin and albuterol nebulizers t.i.d. On ceftazidime per Dr. Kim.  3. Avoiding full anticoagulation due to recent GI bleed.  IVC filter in place.  4. Noted.  5. Noted.  6. Is on nystatin.      Discussed with or notes reviewed:  Patient and with respiratory therapy on the medical floor.

## 2020-05-12 ENCOUNTER — INPATIENT (INPATIENT)
Facility: HOSPITAL | Age: 65
LOS: 4 days | Discharge: HOME | End: 2020-05-17
Attending: INTERNAL MEDICINE | Admitting: INTERNAL MEDICINE
Payer: MEDICARE

## 2020-05-12 VITALS — TEMPERATURE: 99 F | HEART RATE: 101 BPM | RESPIRATION RATE: 20 BRPM | OXYGEN SATURATION: 87 %

## 2020-05-12 DIAGNOSIS — K92.2 GASTROINTESTINAL HEMORRHAGE, UNSPECIFIED: ICD-10-CM

## 2020-05-12 DIAGNOSIS — F05 DELIRIUM DUE TO KNOWN PHYSIOLOGICAL CONDITION: ICD-10-CM

## 2020-05-12 LAB
ALBUMIN SERPL ELPH-MCNC: 2.2 G/DL — LOW (ref 3.3–5)
ALP SERPL-CCNC: 76 U/L — SIGNIFICANT CHANGE UP (ref 40–120)
ALT FLD-CCNC: 38 U/L — SIGNIFICANT CHANGE UP (ref 12–78)
ANION GAP SERPL CALC-SCNC: 10 MMOL/L — SIGNIFICANT CHANGE UP (ref 5–17)
ANION GAP SERPL CALC-SCNC: 12 MMOL/L — SIGNIFICANT CHANGE UP (ref 5–17)
APTT BLD: 23 SEC — LOW (ref 28.5–37)
AST SERPL-CCNC: 26 U/L — SIGNIFICANT CHANGE UP (ref 15–37)
BASOPHILS # BLD AUTO: 0 K/UL — SIGNIFICANT CHANGE UP (ref 0–0.2)
BASOPHILS NFR BLD AUTO: 0 % — SIGNIFICANT CHANGE UP (ref 0–2)
BILIRUB SERPL-MCNC: 0.2 MG/DL — SIGNIFICANT CHANGE UP (ref 0.2–1.2)
BUN SERPL-MCNC: 46 MG/DL — HIGH (ref 7–23)
BUN SERPL-MCNC: 54 MG/DL — HIGH (ref 7–23)
CALCIUM SERPL-MCNC: 7.5 MG/DL — LOW (ref 8.5–10.1)
CALCIUM SERPL-MCNC: 7.8 MG/DL — LOW (ref 8.5–10.1)
CHLORIDE SERPL-SCNC: 108 MMOL/L — SIGNIFICANT CHANGE UP (ref 96–108)
CHLORIDE SERPL-SCNC: 110 MMOL/L — HIGH (ref 96–108)
CO2 SERPL-SCNC: 20 MMOL/L — LOW (ref 22–31)
CO2 SERPL-SCNC: 23 MMOL/L — SIGNIFICANT CHANGE UP (ref 22–31)
CREAT SERPL-MCNC: 1.39 MG/DL — HIGH (ref 0.5–1.3)
CREAT SERPL-MCNC: 1.64 MG/DL — HIGH (ref 0.5–1.3)
EOSINOPHIL # BLD AUTO: 0.15 K/UL — SIGNIFICANT CHANGE UP (ref 0–0.5)
EOSINOPHIL NFR BLD AUTO: 1 % — SIGNIFICANT CHANGE UP (ref 0–6)
GLUCOSE BLDC GLUCOMTR-MCNC: 296 MG/DL — HIGH (ref 70–99)
GLUCOSE BLDC GLUCOMTR-MCNC: 304 MG/DL — HIGH (ref 70–99)
GLUCOSE BLDC GLUCOMTR-MCNC: 305 MG/DL — HIGH (ref 70–99)
GLUCOSE BLDC GLUCOMTR-MCNC: 330 MG/DL — HIGH (ref 70–99)
GLUCOSE SERPL-MCNC: 302 MG/DL — HIGH (ref 70–99)
GLUCOSE SERPL-MCNC: 482 MG/DL — CRITICAL HIGH (ref 70–99)
HCT VFR BLD CALC: 22.6 % — LOW (ref 34.5–45)
HCT VFR BLD CALC: 36.8 % — SIGNIFICANT CHANGE UP (ref 34.5–45)
HGB BLD-MCNC: 11.3 G/DL — LOW (ref 11.5–15.5)
HGB BLD-MCNC: 6.7 G/DL — CRITICAL LOW (ref 11.5–15.5)
INR BLD: 1.08 RATIO — SIGNIFICANT CHANGE UP (ref 0.88–1.16)
LACTATE SERPL-SCNC: 1.7 MMOL/L — SIGNIFICANT CHANGE UP (ref 0.7–2)
LACTATE SERPL-SCNC: 6.1 MMOL/L — CRITICAL HIGH (ref 0.7–2)
LYMPHOCYTES # BLD AUTO: 27 % — SIGNIFICANT CHANGE UP (ref 13–44)
LYMPHOCYTES # BLD AUTO: 4.16 K/UL — HIGH (ref 1–3.3)
MAGNESIUM SERPL-MCNC: 2.7 MG/DL — HIGH (ref 1.6–2.6)
MCHC RBC-ENTMCNC: 27.2 PG — SIGNIFICANT CHANGE UP (ref 27–34)
MCHC RBC-ENTMCNC: 29.6 GM/DL — LOW (ref 32–36)
MCHC RBC-ENTMCNC: 30.7 GM/DL — LOW (ref 32–36)
MCHC RBC-ENTMCNC: 30.9 PG — SIGNIFICANT CHANGE UP (ref 27–34)
MCV RBC AUTO: 104.1 FL — HIGH (ref 80–100)
MCV RBC AUTO: 88.7 FL — SIGNIFICANT CHANGE UP (ref 80–100)
MONOCYTES # BLD AUTO: 0.62 K/UL — SIGNIFICANT CHANGE UP (ref 0–0.9)
MONOCYTES NFR BLD AUTO: 4 % — SIGNIFICANT CHANGE UP (ref 2–14)
NEUTROPHILS # BLD AUTO: 10.33 K/UL — HIGH (ref 1.8–7.4)
NEUTROPHILS NFR BLD AUTO: 66 % — SIGNIFICANT CHANGE UP (ref 43–77)
NRBC # BLD: 0 /100 WBCS — SIGNIFICANT CHANGE UP (ref 0–0)
NRBC # BLD: SIGNIFICANT CHANGE UP /100 WBCS (ref 0–0)
NT-PROBNP SERPL-SCNC: 417 PG/ML — HIGH (ref 0–125)
PHOSPHATE SERPL-MCNC: 6 MG/DL — HIGH (ref 2.5–4.5)
PLATELET # BLD AUTO: 251 K/UL — SIGNIFICANT CHANGE UP (ref 150–400)
PLATELET # BLD AUTO: 318 K/UL — SIGNIFICANT CHANGE UP (ref 150–400)
POTASSIUM SERPL-MCNC: 6.3 MMOL/L — CRITICAL HIGH (ref 3.5–5.3)
POTASSIUM SERPL-MCNC: 6.7 MMOL/L — CRITICAL HIGH (ref 3.5–5.3)
POTASSIUM SERPL-SCNC: 6.3 MMOL/L — CRITICAL HIGH (ref 3.5–5.3)
POTASSIUM SERPL-SCNC: 6.7 MMOL/L — CRITICAL HIGH (ref 3.5–5.3)
PROT SERPL-MCNC: 5.5 GM/DL — LOW (ref 6–8.3)
PROTHROM AB SERPL-ACNC: 12.1 SEC — SIGNIFICANT CHANGE UP (ref 10–12.9)
RBC # BLD: 2.17 M/UL — LOW (ref 3.8–5.2)
RBC # BLD: 4.15 M/UL — SIGNIFICANT CHANGE UP (ref 3.8–5.2)
RBC # FLD: 23.9 % — HIGH (ref 10.3–14.5)
RBC # FLD: 26.5 % — HIGH (ref 10.3–14.5)
SARS-COV-2 RNA SPEC QL NAA+PROBE: SIGNIFICANT CHANGE UP
SODIUM SERPL-SCNC: 138 MMOL/L — SIGNIFICANT CHANGE UP (ref 135–145)
SODIUM SERPL-SCNC: 145 MMOL/L — SIGNIFICANT CHANGE UP (ref 135–145)
TROPONIN I SERPL-MCNC: <.015 NG/ML — SIGNIFICANT CHANGE UP (ref 0.01–0.04)
TSH SERPL-MCNC: 14 UU/ML — HIGH (ref 0.36–3.74)
WBC # BLD: 15.42 K/UL — HIGH (ref 3.8–10.5)
WBC # BLD: 15.42 K/UL — HIGH (ref 3.8–10.5)
WBC # FLD AUTO: 15.42 K/UL — HIGH (ref 3.8–10.5)
WBC # FLD AUTO: 15.42 K/UL — HIGH (ref 3.8–10.5)

## 2020-05-12 PROCEDURE — 99291 CRITICAL CARE FIRST HOUR: CPT

## 2020-05-12 PROCEDURE — 74174 CTA ABD&PLVS W/CONTRAST: CPT | Mod: 26

## 2020-05-12 PROCEDURE — 74178 CT ABD&PLV WO CNTR FLWD CNTR: CPT | Mod: 26

## 2020-05-12 PROCEDURE — 93010 ELECTROCARDIOGRAM REPORT: CPT

## 2020-05-12 PROCEDURE — 90792 PSYCH DIAG EVAL W/MED SRVCS: CPT

## 2020-05-12 PROCEDURE — 71045 X-RAY EXAM CHEST 1 VIEW: CPT | Mod: 26

## 2020-05-12 PROCEDURE — 99221 1ST HOSP IP/OBS SF/LOW 40: CPT

## 2020-05-12 RX ORDER — GLUCAGON INJECTION, SOLUTION 0.5 MG/.1ML
1 INJECTION, SOLUTION SUBCUTANEOUS ONCE
Refills: 0 | Status: DISCONTINUED | OUTPATIENT
Start: 2020-05-12 | End: 2020-05-17

## 2020-05-12 RX ORDER — TRAMADOL HYDROCHLORIDE 50 MG/1
50 TABLET ORAL ONCE
Refills: 0 | Status: DISCONTINUED | OUTPATIENT
Start: 2020-05-12 | End: 2020-05-12

## 2020-05-12 RX ORDER — DEXTROSE 50 % IN WATER 50 %
12.5 SYRINGE (ML) INTRAVENOUS ONCE
Refills: 0 | Status: DISCONTINUED | OUTPATIENT
Start: 2020-05-12 | End: 2020-05-13

## 2020-05-12 RX ORDER — ALBUTEROL 90 UG/1
2.5 AEROSOL, METERED ORAL ONCE
Refills: 0 | Status: COMPLETED | OUTPATIENT
Start: 2020-05-12 | End: 2020-05-13

## 2020-05-12 RX ORDER — FENTANYL CITRATE 50 UG/ML
25 INJECTION INTRAVENOUS ONCE
Refills: 0 | Status: DISCONTINUED | OUTPATIENT
Start: 2020-05-12 | End: 2020-05-12

## 2020-05-12 RX ORDER — ACETAMINOPHEN 500 MG
1000 TABLET ORAL ONCE
Refills: 0 | Status: COMPLETED | OUTPATIENT
Start: 2020-05-12 | End: 2020-05-12

## 2020-05-12 RX ORDER — DEXTROSE 50 % IN WATER 50 %
15 SYRINGE (ML) INTRAVENOUS ONCE
Refills: 0 | Status: DISCONTINUED | OUTPATIENT
Start: 2020-05-12 | End: 2020-05-13

## 2020-05-12 RX ORDER — INSULIN LISPRO 100/ML
VIAL (ML) SUBCUTANEOUS EVERY 6 HOURS
Refills: 0 | Status: DISCONTINUED | OUTPATIENT
Start: 2020-05-12 | End: 2020-05-13

## 2020-05-12 RX ORDER — OLANZAPINE 15 MG/1
5 TABLET, FILM COATED ORAL ONCE
Refills: 0 | Status: COMPLETED | OUTPATIENT
Start: 2020-05-12 | End: 2020-05-12

## 2020-05-12 RX ORDER — OLANZAPINE 15 MG/1
2.5 TABLET, FILM COATED ORAL
Refills: 0 | Status: DISCONTINUED | OUTPATIENT
Start: 2020-05-13 | End: 2020-05-13

## 2020-05-12 RX ORDER — INSULIN GLARGINE 100 [IU]/ML
15 INJECTION, SOLUTION SUBCUTANEOUS AT BEDTIME
Refills: 0 | Status: DISCONTINUED | OUTPATIENT
Start: 2020-05-12 | End: 2020-05-13

## 2020-05-12 RX ORDER — SODIUM CHLORIDE 9 MG/ML
1000 INJECTION, SOLUTION INTRAVENOUS
Refills: 0 | Status: DISCONTINUED | OUTPATIENT
Start: 2020-05-12 | End: 2020-05-13

## 2020-05-12 RX ORDER — LEVOTHYROXINE SODIUM 125 MCG
200 TABLET ORAL DAILY
Refills: 0 | Status: DISCONTINUED | OUTPATIENT
Start: 2020-05-12 | End: 2020-05-17

## 2020-05-12 RX ORDER — CALCIUM GLUCONATE 100 MG/ML
1 VIAL (ML) INTRAVENOUS ONCE
Refills: 0 | Status: COMPLETED | OUTPATIENT
Start: 2020-05-12 | End: 2020-05-12

## 2020-05-12 RX ORDER — INSULIN HUMAN 100 [IU]/ML
8 INJECTION, SOLUTION SUBCUTANEOUS ONCE
Refills: 0 | Status: COMPLETED | OUTPATIENT
Start: 2020-05-12 | End: 2020-05-12

## 2020-05-12 RX ORDER — DEXTROSE 50 % IN WATER 50 %
50 SYRINGE (ML) INTRAVENOUS ONCE
Refills: 0 | Status: COMPLETED | OUTPATIENT
Start: 2020-05-12 | End: 2020-05-12

## 2020-05-12 RX ORDER — TRAMADOL HYDROCHLORIDE 50 MG/1
25 TABLET ORAL ONCE
Refills: 0 | Status: DISCONTINUED | OUTPATIENT
Start: 2020-05-12 | End: 2020-05-12

## 2020-05-12 RX ORDER — ACETAMINOPHEN 500 MG
975 TABLET ORAL ONCE
Refills: 0 | Status: COMPLETED | OUTPATIENT
Start: 2020-05-12 | End: 2020-05-12

## 2020-05-12 RX ORDER — INSULIN HUMAN 100 [IU]/ML
10 INJECTION, SOLUTION SUBCUTANEOUS ONCE
Refills: 0 | Status: COMPLETED | OUTPATIENT
Start: 2020-05-12 | End: 2020-05-12

## 2020-05-12 RX ORDER — SODIUM BICARBONATE 1 MEQ/ML
0.19 SYRINGE (ML) INTRAVENOUS
Qty: 150 | Refills: 0 | Status: COMPLETED | OUTPATIENT
Start: 2020-05-12 | End: 2020-05-13

## 2020-05-12 RX ORDER — SODIUM ZIRCONIUM CYCLOSILICATE 10 G/10G
10 POWDER, FOR SUSPENSION ORAL EVERY 8 HOURS
Refills: 0 | Status: COMPLETED | OUTPATIENT
Start: 2020-05-12 | End: 2020-05-14

## 2020-05-12 RX ORDER — PANTOPRAZOLE SODIUM 20 MG/1
40 TABLET, DELAYED RELEASE ORAL
Refills: 0 | Status: DISCONTINUED | OUTPATIENT
Start: 2020-05-12 | End: 2020-05-14

## 2020-05-12 RX ORDER — HALOPERIDOL DECANOATE 100 MG/ML
2 INJECTION INTRAMUSCULAR ONCE
Refills: 0 | Status: COMPLETED | OUTPATIENT
Start: 2020-05-12 | End: 2020-05-12

## 2020-05-12 RX ORDER — SODIUM CHLORIDE 9 MG/ML
1000 INJECTION, SOLUTION INTRAVENOUS ONCE
Refills: 0 | Status: COMPLETED | OUTPATIENT
Start: 2020-05-12 | End: 2020-05-12

## 2020-05-12 RX ORDER — SODIUM CHLORIDE 9 MG/ML
1000 INJECTION, SOLUTION INTRAVENOUS
Refills: 0 | Status: DISCONTINUED | OUTPATIENT
Start: 2020-05-12 | End: 2020-05-17

## 2020-05-12 RX ORDER — PIPERACILLIN AND TAZOBACTAM 4; .5 G/20ML; G/20ML
3.38 INJECTION, POWDER, LYOPHILIZED, FOR SOLUTION INTRAVENOUS ONCE
Refills: 0 | Status: COMPLETED | OUTPATIENT
Start: 2020-05-12 | End: 2020-05-12

## 2020-05-12 RX ORDER — SODIUM CHLORIDE 9 MG/ML
500 INJECTION, SOLUTION INTRAVENOUS ONCE
Refills: 0 | Status: COMPLETED | OUTPATIENT
Start: 2020-05-12 | End: 2020-05-12

## 2020-05-12 RX ORDER — PIPERACILLIN AND TAZOBACTAM 4; .5 G/20ML; G/20ML
3.38 INJECTION, POWDER, LYOPHILIZED, FOR SOLUTION INTRAVENOUS EVERY 8 HOURS
Refills: 0 | Status: DISCONTINUED | OUTPATIENT
Start: 2020-05-12 | End: 2020-05-15

## 2020-05-12 RX ORDER — CHLORHEXIDINE GLUCONATE 213 G/1000ML
1 SOLUTION TOPICAL
Refills: 0 | Status: DISCONTINUED | OUTPATIENT
Start: 2020-05-12 | End: 2020-05-13

## 2020-05-12 RX ADMIN — TRAMADOL HYDROCHLORIDE 50 MILLIGRAM(S): 50 TABLET ORAL at 15:58

## 2020-05-12 RX ADMIN — SODIUM CHLORIDE 1000 MILLILITER(S): 9 INJECTION, SOLUTION INTRAVENOUS at 12:30

## 2020-05-12 RX ADMIN — INSULIN GLARGINE 15 UNIT(S): 100 INJECTION, SOLUTION SUBCUTANEOUS at 22:09

## 2020-05-12 RX ADMIN — INSULIN HUMAN 8 UNIT(S): 100 INJECTION, SOLUTION SUBCUTANEOUS at 12:56

## 2020-05-12 RX ADMIN — PANTOPRAZOLE SODIUM 40 MILLIGRAM(S): 20 TABLET, DELAYED RELEASE ORAL at 18:57

## 2020-05-12 RX ADMIN — Medication 3: at 23:33

## 2020-05-12 RX ADMIN — SODIUM CHLORIDE 100 MILLILITER(S): 9 INJECTION, SOLUTION INTRAVENOUS at 19:11

## 2020-05-12 RX ADMIN — FENTANYL CITRATE 25 MICROGRAM(S): 50 INJECTION INTRAVENOUS at 22:09

## 2020-05-12 RX ADMIN — PIPERACILLIN AND TAZOBACTAM 200 GRAM(S): 4; .5 INJECTION, POWDER, LYOPHILIZED, FOR SOLUTION INTRAVENOUS at 18:59

## 2020-05-12 RX ADMIN — Medication 4: at 18:59

## 2020-05-12 RX ADMIN — SODIUM CHLORIDE 1000 MILLILITER(S): 9 INJECTION, SOLUTION INTRAVENOUS at 12:13

## 2020-05-12 RX ADMIN — Medication 400 MILLIGRAM(S): at 23:21

## 2020-05-12 RX ADMIN — OLANZAPINE 5 MILLIGRAM(S): 15 TABLET, FILM COATED ORAL at 15:58

## 2020-05-12 RX ADMIN — HALOPERIDOL DECANOATE 2 MILLIGRAM(S): 100 INJECTION INTRAMUSCULAR at 21:18

## 2020-05-12 NOTE — ED ADULT NURSE REASSESSMENT NOTE - NS ED NURSE REASSESS COMMENT FT1
pt tolerated 1st unit of PRBC's no adverse reaction noted. Pt continue to be monitored awaiting ICU bed assignment

## 2020-05-12 NOTE — ED ADULT NURSE NOTE - CAS EDN DISCHARGE ASSESSMENT
Cardiology Follow-Up    HPI:    Mr. Quesada is a 79 year old male with past medical history significant for coronary artery disease status post CABG x3 (LIMA to LAD, SVG to OM, SVG to RCA), inferior wall MI that was silent, chronic kidney disease, right bundle branch block, left anterior fascicular block, orthostatic hypotension, prior history of CVA, questionable history of paroxysmal atrial fibrillation, dyslipidemia intolerant to statins and Zetia who presents to our office for continued cardiovascular care.  He was last seen on October 5, 2018 by Dr. Pulido.     At today's visit he denies chest pain, pressure, or tightness.  No shortness of breath, orthopnea, PND, or leg swelling.  He does occasionally experience palpitations and states that he has been monitoring his heart rate and they have been irregular and at times bradycardic with lowest and 30s.  No syncope, near syncope, or dizziness. He did undergo Holter monitor which did not show any sustained arrhythmias.      Past Medical History:   Diagnosis Date   • Arrhythmia    • Chronic kidney disease    • Coronary artery disease    • Essential (primary) hypertension    • Hyperlipoproteinemia    • Malignant neoplasm (CMS/HCC)    • Stroke (CMS/HCC)    • Syncope    • Thyroid disease        Past Surgical History:   Procedure Laterality Date   • Cholecystectomy  11/30/2015    6 angioectasias were cauterized   • Colonoscopy     • Colonoscopy  07/09/2015   • Colonoscopy  07/09/2015    for polyp removal   • Coronary artery bypass graft  05/09/2014   • Esophagogastroduodenoscopy  06/23/2008   • Laminectomy      L5-S1 Discetomy with foraminotomy performed 4/21/2008   • Rotator cuff repair         MEDICATIONS  Outpatient Encounter Medications as of 2/12/2020   Medication Sig Dispense Refill   • tamsulosin (FLOMAX) 0.4 MG Cap TAKE 1 CAPSULE BY MOUTH ONCE DAILY 90 capsule 3   • lisinopril-hydroCHLOROthiazide (PRINZIDE,ZESTORETIC) 10-12.5 MG per tablet Take 1 tablet by  mouth daily. 90 tablet 0   • metoPROLOL succinate (TOPROL-XL) 25 MG 24 hr tablet TAKE 1/2 (ONE-HALF) TABLET BY MOUTH ONCE DAILY 45 tablet 1   • ALPRAZolam (XANAX) 1 MG tablet TAKE 1/2 (ONE-HALF) TABLET BY MOUTH TWICE DAILY AS NEEDED FOR SLEEP 30 tablet 5   • sertraline (ZOLOFT) 50 MG tablet TAKE 1 TABLET BY MOUTH ONCE DAILY 90 tablet 1   • allopurinol (ZYLOPRIM) 100 MG tablet Take 1 tablet by mouth daily. 90 tablet 3   • famotidine (PEPCID) 10 MG tablet Take 5 mg by mouth 2 times daily.      • aspirin 81 MG tablet Take 1 tablet by mouth daily. 30 tablet    • nitroGLYcerin (NITROSTAT) 0.4 MG sublingual tablet Place 1 tablet under the tongue every 5 minutes as needed for Chest pain. 25 tablet 12     No facility-administered encounter medications on file as of 2/12/2020.        ALLERGIES  Allergies as of 02/12/2020 - Reviewed 02/12/2020   Allergen Reaction Noted   • Diclofenac Other (See Comments) 04/09/2019   • Diclofenac sodium Other (See Comments) 04/09/2019   • Ezetimibe WEAKNESS and Other (See Comments) 04/10/2007   • Fenofibrate Other (See Comments) 04/09/2019   • Flax seed   (food or med) Other (See Comments) 03/14/2019   • Gemcor Other (See Comments) 02/12/2008   • Gemfibrozil Other (See Comments) 04/09/2019   • Kdc:linseed oil+linolenic acid Other (See Comments) 02/12/2008   • Linolenic acid Other (See Comments) 04/09/2019   • Linseed oil Other (See Comments) 03/14/2019   • Statins WEAKNESS and Other (See Comments) 06/09/2010   • Tricor Other (See Comments) 06/01/2004   • Voltaren Other (See Comments) 02/01/1994       Social History     Socioeconomic History   • Marital status: /Civil Union     Spouse name: Not on file   • Number of children: Not on file   • Years of education: Not on file   • Highest education level: Not on file   Occupational History   • Not on file   Social Needs   • Financial resource strain: Not on file   • Food insecurity:     Worry: Not on file     Inability: Not on file   •  Transportation needs:     Medical: Not on file     Non-medical: Not on file   Tobacco Use   • Smoking status: Former Smoker     Packs/day: 0.00   • Smokeless tobacco: Never Used   Substance and Sexual Activity   • Alcohol use: No     Frequency: Never   • Drug use: No   • Sexual activity: Not on file   Lifestyle   • Physical activity:     Days per week: Not on file     Minutes per session: Not on file   • Stress: Not on file   Relationships   • Social connections:     Talks on phone: Not on file     Gets together: Not on file     Attends Spiritism service: Not on file     Active member of club or organization: Not on file     Attends meetings of clubs or organizations: Not on file     Relationship status: Not on file   • Intimate partner violence:     Fear of current or ex partner: Not on file     Emotionally abused: Not on file     Physically abused: Not on file     Forced sexual activity: Not on file   Other Topics Concern   • Not on file   Social History Narrative   • Not on file       History reviewed. No pertinent family history.      REVIEW OF SYSTEMS:    All other pertinent review of system is otherwise negative except as those noted in HPI     Vitals: Blood pressure 136/82, pulse 65, resp. rate 14, height 6' (1.829 m), weight 85.5 kg (188 lb 7.9 oz), SpO2 97 %.    Physical Exam  General: Alert, awake, Oriented X3.  In no acute distress.  Head: Head appears atraumatic, normocephalic.  ENT: Sclera is anicteric.  Mucous membranes are moist.  Neck is supple.  There is no jugulovenous distention noted.  Carotids: Bruit noted on left  Heart: S1, S2, faint systolic murmur noted.  Prior sternotomy scar well-healed.  Lungs: Clear to auscultation bilaterally.  No wheezing, rales, or rhonchi.  Abdomen: Soft, nontender, nondistended.  Positive bowel sounds in all 4 quadrants.  Neurological: Cranial nerves II-XII are grossly intact.  Grossly nonfocal.  Skin: Warm, dry, and intact.  Extremities: No clubbing, cyanosis, or  edema.  Psychiatric: Pleasant affect.        LABORATORY  Lipids reviewed  CBC No Diff  No results found for: WBC, RBC, HGB, HCT, MCV, MCH, MCHC, RDWCV, PLT, SKMPV    CMP  Lab Results   Component Value Date    FSTS1 UNKNOWN 11/10/2014    SODIUM 142 11/10/2014    POTASSIUM 4.0 03/06/2019    CHLORIDE 106 11/10/2014    CO2 29 11/10/2014    ANIONGAP 11 11/10/2014    GLUCOSE 112 (H) 11/10/2014    BUN 48 (H) 03/06/2019    CREATININE 1.88 (H) 03/06/2019    GFRA 55 (L) 11/10/2014    BCRAT 13 11/10/2014    CALCIUM 9.8 11/10/2014    AST 26 10/16/2019       Lipid Panel with Reflex  No results found for: FSTS, CHOLESTEROL, CALCLDL, HDL, TRIGLYCERIDE, NONHDL, CHOHDL    TSH  Lab Results   Component Value Date    TSH 1.5 07/07/2015       CARDIAC TESTING:  EKG: March 6, 2019: 143: Normal sinus rhythm at 68 bpm.    Holter monitor: December 3, 2019: Sinus rhythm with average heart rate of 59 bpm.  Minimum heart rate is 45 bpm.  Maximal heart rate is 107 bpm.  Rare ventricular and supraventricular ectopy noted.  No sustained arrhythmias noted.  No high degree AV block noted.  No pauses greater than 3 seconds were noted.    ASSESSED DIAGNOSIS  Atherosclerosis of native coronary artery of native heart, angina presence unspecified  - STRESS TEST WITH MYOCARDIAL PERFUSION; Future  - TRANSTHORACIC ECHO(TTE) COMPLETE W/ W/O IMAGING AGENT; Future  - CARDIAC EVENT MONITOR; Future    Essential hypertension  - STRESS TEST WITH MYOCARDIAL PERFUSION; Future  - TRANSTHORACIC ECHO(TTE) COMPLETE W/ W/O IMAGING AGENT; Future  - CARDIAC EVENT MONITOR; Future    Bradycardia, unspecified  - STRESS TEST WITH MYOCARDIAL PERFUSION; Future  - TRANSTHORACIC ECHO(TTE) COMPLETE W/ W/O IMAGING AGENT; Future  - CARDIAC EVENT MONITOR; Future    Left carotid bruit  - US VASC CAROTID DUPLEX; Future      IMPRESSIONS  1.  Coronary artery disease with history of CABG  2.  Chronic kidney disease  3.  History of CVA  4.  Questionable history of paroxysmal atrial  fibrillation  5.  Hyperlipidemia intolerant to statins  6.  Hypertension  7.  Chronic kidney disease  8.  Bradycardia  9.  Left carotid bruit    RECOMMENDATIONS   has been having intermittent symptoms of what he describes as palpitations and bradycardia.  He is also noted his heart rate to be irregular.  He did undergo 48-hour Holter monitor that was unremarkable.  At this time in view of this I will have him obtain a 30-day ACT monitor.  His bradycardia may be related to beta-blocker use.  He does have questionable history of atrial fibrillation however it is not completely clear and he is not on any anticoagulation.  Also in regards to his history of coronary artery disease he has not had any testing since 2015 and with his history of CABG I will have him obtain a repeat stress test and transthoracic echocardiogram to assess LV function and cardiac structures.  I will recommend that he start aspirin 81 mg daily.  In regards to his dyslipidemia he is not on any statin therapy and he is not willing to try again as he has been intolerant in the past.  His LDL is 77.  We also discussed about option of PCSK9 inhibitor however he is not interested at this time.  Carotid bruit was auscultated on exam today for which I will have him obtain a carotid ultrasound.  I will have him return for follow-up in 1 year unless needed sooner.    Thank you for allowing us to participate in care of this pleasant patient.  Please do not hesitate to contact us if we can be of any further assistance.    Return in about 1 year (around 2/12/2021).      Disclaimer Note: Your provider prepared this document using voice recognition technology.  In that case, if a word or phrase is confusing, or does not make sense, this is likely due to a recognition error within the program which was not discovered during the provider's review.  If you believe an error has occurred, please notify your provider's office at your earliest convenience,  so we can correct any mistakes.   Patient baseline mental status

## 2020-05-12 NOTE — BEHAVIORAL HEALTH ASSESSMENT NOTE - NSBHCHARTREVIEWLAB_PSY_A_CORE FT
05-12    145  |  110<H>  |  46<H>  ----------------------------<  482<HH>  6.3<HH>   |  23  |  1.39<H>    Ca    7.5<L>      12 May 2020 12:00    TPro  5.5<L>  /  Alb  2.2<L>  /  TBili  0.2  /  DBili  x   /  AST  26  /  ALT  38  /  AlkPhos  76  05-12

## 2020-05-12 NOTE — ED ADULT TRIAGE NOTE - CHIEF COMPLAINT QUOTE
son called because he noted bright red blood in stool this am. psych history and diabetes. denies being on blood thinners. saturation 87% at triage. unable to obtain blood pressure after 3 attempts

## 2020-05-12 NOTE — ED PROVIDER NOTE - OBJECTIVE STATEMENT
Jonathan Weil, PGY3 - 64F PMH hypothyroidism, HTN, IDDM, COPD/CHRIS, RA (on Prednisone), SBO s/p ex lap with lysis of adhesions c/b evisceration p/w GI bleeding. She started having BRBPR about 4-5 hours ago, with numerous episodes of bloody stool since then. She denies any other symptoms - denies dyspnea/chest pain/light headedness/dizziness. Her son notes this involved several episodes of diarrhea w/ bloody stool after an enema earlier in the day. Jonathan Weil, PGY3 - 64F PMH hypothyroidism, HTN, IDDM, COPD/CHRIS, RA (on Prednisone), SBO s/p ex lap with lysis of adhesions c/b evisceration p/w GI bleeding. She started having BRBPR about 4-5 hours ago, with numerous episodes of bloody stool since then. She denies any other symptoms - denies dyspnea/chest pain/light headedness/dizziness. Her son notes this involved several episodes of diarrhea w/ bloody stool after an enema earlier in the day.    Son notes patient had SBO treated surgically c/b evisceration when her sutures became disrupted. He reports that the patient sometimes indicates she had surgery on her rectum, but this is not true.

## 2020-05-12 NOTE — BEHAVIORAL HEALTH ASSESSMENT NOTE - NSBHCHARTREVIEWIMAGING_PSY_A_CORE FT
CT TA negative for radiographic evidence of hemorrhage, but does identify incidentally an emphysematous cystitis.

## 2020-05-12 NOTE — H&P ADULT - HISTORY OF PRESENT ILLNESS
64 year old female with a history of hypothyroidism, HTN, IDDM, COPD/CHRIS, RA (on Prednisone), SBO s/p ex lap with lysis of adhesions c/b evisceration,  recent hospitalization in 3/2020 with encephalopathy found to be significantly hypothyroid (, without myxedema coma), small (stable) right sided SDH, UTI, catatonic reaction to VPA (now resolved) and DKA (resolved) and ongoing psychiatric issues. Presents today with GI bleeding. She started having BRBPR about 4-5 hours ago, with numerous episodes of bloody stool since then. She denies any other symptoms - denies dyspnea/chest pain/light headedness/dizziness. Her son notes this involved several episodes of diarrhea w/ bloody stool after an enema earlier in the day.    In ED: Sinus tach, hypotensive and sPO2 87% on RA.  Labs remarkable for anemia, SMA, hyperglycemia, lactate 6.1, K 6.3, TSH 14  CTA shows < from: CT Angio Abdomen and Pelvis w/ IV Cont (05.12.20 @ 13:37) >  Emphysematous cystitis. Air is also present in the uterine wall consistent with endometritis or myometritis. Recommend surgical/urologic and OB/GYN evaluation.  Hyperemic sigmoid mucosa may indicate colitis. Sigmoid diverticulosis without diverticulitis.

## 2020-05-12 NOTE — ED PROVIDER NOTE - ATTENDING CONTRIBUTION TO CARE
64 year old female PMHx COPD, DM, HTN c/o rectal bleeding today. PE: mild distress, CV tachycardic, lungs clear, abd large pannus, non-tender, + gross blood in rectal vault. I&P: GI bleed, hyperglycemia, hyperkalemia, transfusion, insulin, hydration, admit

## 2020-05-12 NOTE — BEHAVIORAL HEALTH ASSESSMENT NOTE - OTHER
intermittent - keeps eyes mostly closed during assessment low end of fair deferred at this time slightly slowed slight latency "not feeling well" looks ill intermittent episodes of linearity which changes to episodes of confusion/impaired reasoning focus on her previous surgery unable to tolerate an MMSE at this time limited at this time see HPI

## 2020-05-12 NOTE — CONSULT NOTE ADULT - SUBJECTIVE AND OBJECTIVE BOX
HPI:  64 year old female with a history of hypothyroidism, HTN, IDDM, COPD/CHRIS, RA (on Prednisone), SBO s/p ex lap with lysis of adhesions c/b evisceration,  recent hospitalization in 3/2020 with encephalopathy found to be significantly hypothyroid (, without myxedema coma), small (stable) right sided SDH, UTI, catatonic reaction to VPA (now resolved) and DKA (resolved) and ongoing psychiatric issues. Presents today with GI bleeding. She started having BRBPR about 4-5 hours ago, with numerous episodes of bloody stool since then. She denies any other symptoms - denies dyspnea/chest pain/light headedness/dizziness. Her son notes this involved several episodes of diarrhea w/ bloody stool after an enema earlier in the day.      In ED: Sinus tach, hypotensive and sPO2 87% on RA.  Labs remarkable for anemia, SAM, hyperglycemia, lactate 6.1, K 6.3, TSH 14  CTA shows < from: CT Angio Abdomen and Pelvis w/ IV Cont (20 @ 13:37) >  Emphysematous cystitis. Air is also present in the uterine wall consistent with endometritis or myometritis. Recommend surgical/urologic and OB/GYN evaluation.  Hyperemic sigmoid mucosa may indicate colitis. Sigmoid diverticulosis without diverticulitis. (12 May 2020 14:50)    -------------- As Above ------- Patient seen earlier today  The patient is a poor historian. Presents with multiple episodes of bloody BM.  In the ER, patient noted ti be temporarily hypotensive. Painless. Denies anticoagulants or NSAIDs.   Last colonoscopy (?). In ER , patient noted to have elevated potassium level.   No further bloody BMs since last seen.   History of SBO repair      PAST MEDICAL & SURGICAL HISTORY:  Diverticulosis  RA (Rheumatoid Arthritis)  Tooth Abscess  Arthritis  Cigarette Smoker  Chronic Asthma  Adult Hypothyroidism  Borderline Diabetes Mellitus  High Cholesterol  H/O: HTN  Wrist Disorder: S/P Surgery  History of  Section      MEDICATIONS  (STANDING):  chlorhexidine 4% Liquid 1 Application(s) Topical <User Schedule>  dextrose 5%. 1000 milliLiter(s) (50 mL/Hr) IV Continuous <Continuous>  dextrose 50% Injectable 12.5 Gram(s) IV Push once  insulin glargine Injectable (LANTUS) 15 Unit(s) SubCutaneous at bedtime  insulin lispro (HumaLOG) corrective regimen sliding scale   SubCutaneous every 6 hours  lactated ringers. 1000 milliLiter(s) (100 mL/Hr) IV Continuous <Continuous>  levothyroxine 200 MICROGram(s) Oral daily  pantoprazole  Injectable 40 milliGRAM(s) IV Push two times a day  piperacillin/tazobactam IVPB. 3.375 Gram(s) IV Intermittent Once  piperacillin/tazobactam IVPB.. 3.375 Gram(s) IV Intermittent every 8 hours    MEDICATIONS  (PRN):  dextrose 40% Gel 15 Gram(s) Oral once PRN Blood Glucose LESS THAN 70 milliGRAM(s)/deciliter  glucagon  Injectable 1 milliGRAM(s) IntraMuscular once PRN Glucose LESS THAN 70 milligrams/deciliter      Allergies    Depakote (Other)  Seroquel (Other (Severe))    Intolerances        FAMILY HISTORY:  No pertinent family history in first degree relatives      REVIEW OF SYSTEMS:    CONSTITUTIONAL: No fever, weight loss, or fatigue  EYES: No eye pain, visual disturbances, or discharge  ENMT:  No difficulty hearing, tinnitus, vertigo; No sinus or throat pain  NECK: No pain or stiffness  BREASTS: No pain, masses, or nipple discharge  RESPIRATORY: No cough, wheezing, chills or hemoptysis; No shortness of breath  CARDIOVASCULAR: No chest pain, palpitations, dizziness, or leg swelling  GASTROINTESTINAL: See above  GENITOURINARY: No dysuria, frequency, hematuria, or incontinence  NEUROLOGICAL: No headaches, memory loss, loss of strength, numbness, or tremors  SKIN: No itching, burning, rashes, or lesions   LYMPH NODES: No enlarged glands  ENDOCRINE: No heat or cold intolerance; No hair loss  MUSCULOSKELETAL: No joint pain or swelling; No muscle, back, or extremity pain  PSYCHIATRIC: No depression, anxiety, mood swings, or difficulty sleeping  HEME/LYMPH: No easy bruising, or bleeding gums  ALLERGY AND IMMUNOLOGIC: No hives or eczema          SOCIAL HISTORY:    FAMILY HISTORY:  No pertinent family history in first degree relatives      Vital Signs Last 24 Hrs  T(C): 36.5 (12 May 2020 15:30), Max: 37.3 (12 May 2020 10:53)  T(F): 97.7 (12 May 2020 15:30), Max: 99.1 (12 May 2020 10:53)  HR: 93 (12 May 2020 16:06) (75 - 101)  BP: 110/44 (12 May 2020 16:06) (94/43 - 110/44)  BP(mean): --  RR: 20 (12 May 2020 16:06) (20 - 20)  SpO2: 100% (12 May 2020 16:06) (87% - 100%)    PHYSICAL EXAM:    GENERAL: NAD, well-groomed, well-developed  HEAD:  Atraumatic, Normocephalic  EYES: EOMI, PERRLA, conjunctiva and sclera clear  ENMT: No tonsillar erythema, exudates, or enlargement; Moist mucous membranes, Good dentition, No lesions  NECK: Supple, No JVD, Normal thyroid  NERVOUS SYSTEM:  Alert & Oriented (?), Good concentration;   CHEST/LUNG: Clear to percussion bilaterally; No rales, rhonchi, wheezing, or rubs  HEART: Regular rate and rhythm; No murmurs, rubs, or gallops  ABDOMEN: Soft, Nontender, Nondistended; Bowel sounds present  EXTREMITIES:  2+ Peripheral Pulses, No clubbing, cyanosis, or edema  LYMPH: No lymphadenopathy noted   RECTAL: Refused   SKIN: No rashes or lesions    LABS:                        6.7    15.42 )-----------( 318      ( 12 May 2020 12:00 )             22.6       CBC:  05-12 @ 12:00  WBC  15.42  HGB 6.7  HCT 22.6 Plate 318  .1           12 May 2020 12:00    145    |  110    |  46     ----------------------------<  482    6.3     |  23     |  1.39     Ca    7.5        12 May 2020 12:00    TPro  5.5    /  Alb  2.2    /  TBili  0.2    /  DBili  x      /  AST  26     /  ALT  38     /  AlkPhos  76     12 May 2020 12:00    PT/INR - ( 12 May 2020 12:00 )   PT: 12.1 sec;   INR: 1.08 ratio         PTT - ( 12 May 2020 12:00 )  PTT:23.0 sec        RADIOLOGY & ADDITIONAL STUDIES:  < from: CT Angio Abdomen and Pelvis w/ IV Cont (20 @ 13:37) >    EXAM:  CT ANGIO ABD PELV (W)AW IC                            PROCEDURE DATE:  2020          INTERPRETATION:  CLINICAL INDICATION: 64 years  Female with GI bleed. Diarrhea with bloody stools after enema earlier the same day.    COMPARISON: 2020    PROCEDURE:   CT of the Abdomen and Pelvis was performed with and without intravenous contrast.  Precontrast, Arterial and Delayed phases were performed.  Intravenous contrast: 90 ml Omnipaque 350. 10 ml discarded.  Oral contrast: None.  Sagittal and coronal reformats were performed.    FINDINGS:    LOWER CHEST: 1.5 cm right lower lobe bulla. Mild bibasilar dependent changes.    LIVER: Within normal limits.  BILE DUCTS: Normal caliber.  GALLBLADDER: Within normal limits.  SPLEEN: Within normal limits.  PANCREAS: Within normal limits.  ADRENALS: Within normal limits.  KIDNEYS/URETERS: Multiple bilateral renal cysts and renal hypodensities too small to characterize, unchanged.    BLADDER: Circumferential air in the bladder wall consistent with emphysematous cystitis.  REPRODUCTIVE ORGANS: Air is present in the uterine wall. 4.6 cm fundal myoma reidentified.    BOWEL: No bowel obstruction. Appendix is normal.. High density in the stomach on noncontrast study, likely tablet or other ingested material. Thickened hyperemic sigmoid wall. No intraluminal extravasated contrast identified. Sigmoid diverticulosis without diverticulitis.  PERITONEUM: No ascites. No pneumoperitoneum.  VESSELS: Atherosclerotic aorta without aneurysm. The celiac axis and SMA are patent.  RETROPERITONEUM/LYMPH NODES: No lymphadenopathy.    ABDOMINAL WALL: Laxity of the anterior abdominal wall containing nonobstructed bowel. 9 mm hypodense right peroneal lesion reidentified, likely sebaceous cyst.  BONES: Chronic right proximal femur deformity secondary to prior fracture. Thoracolumbar degenerative changes.    IMPRESSION:    Emphysematous cystitis. Air is also present in the uterine wall consistent with endometritis or myometritis. Recommend surgical/urologic and OB/GYN evaluation.    No active intraluminal extravasation of contrast identified to indicate active GI bleed. Hyperemic sigmoid mucosa may indicate colitis. Sigmoid diverticulosis without diverticulitis.            CRITICAL VALUE: I discussed the major findings in this report with Dr. Jonathan Weil in the ED at 2020 1:54 PM with readback. Critical value policy of the hospital was followed. Read back and confirmation of receipt of this communication was  performed. This verbal communication supplements the text report of this document.                MG BEACH M.D., ATTENDING RADIOLOGIST  This document has been electronically signed. May 12 2020  2:24PM        < end of copied text >

## 2020-05-12 NOTE — ED ADULT NURSE NOTE - NSIMPLEMENTINTERV_GEN_ALL_ED
Implemented All Fall Risk Interventions:  [place] to call system. Call bell, personal items and telephone within reach. Instruct patient to call for assistance. Room bathroom lighting operational. Non-slip footwear when patient is off stretcher. Physically safe environment: no spills, clutter or unnecessary equipment. Stretcher in lowest position, wheels locked, appropriate side rails in place. Provide visual cue, wrist band, yellow gown, etc. Monitor gait and stability. Monitor for mental status changes and reorient to person, place, and time. Review medications for side effects contributing to fall risk. Reinforce activity limits and safety measures with patient and family.

## 2020-05-12 NOTE — BEHAVIORAL HEALTH ASSESSMENT NOTE - HPI (INCLUDE ILLNESS QUALITY, SEVERITY, DURATION, TIMING, CONTEXT, MODIFYING FACTORS, ASSOCIATED SIGNS AND SYMPTOMS)
65 yo F, noncaregiver, domiciled alone with a HHA, most recently was at a [facility], retired, has an adult son (very involved), with no formal psychiatric history until February of 2020 when she had Delirium (seen by [facility] CL psych Team and treated with Zyprexa),  w/ PMH of HTN, DM, CHRIS on CPAP, RA p/w altered mental status. Per chart, patient was aggressive and combative in the room; per chart, son states that patient has no psychiatric history and change in behavior occurred 3 weeks ago acutely. DC to [facility] from [facility] on 3/27/20 presenting to the  ED today s/p having BRBPR about 4-5 hours prior with numerous episodes of bloody stool since then. Pt's son called 911.     EXAM: Patient is lying in bed, N95 mask on, diaphoretic, obese, c/o of pain from her hernia surgery, aware that she is getting a transfusion and able to state why. However, Patient has episodes when she is not able to maintain linear logical though process and has impaired reasoning. She has moments of clarity when she is linear and able to provide a reliable history. Focused on going to [facility] to see her surgeon (who repaired her hernia) but not able to appreciate the issue that her hernia operation is not the current problem at this time. Patient could not recall previous conversations with specialists who came to see her in the ED and said "I saw many" and "they talked to me like I was crazy so I said nothing" and could not recall any recommended procedures/interventions. Patient also could not recall when she had her hernia repair and said "sometime before the lock down". 63 yo F, noncaregiver, domiciled alone with a HHA, most recently was at a [facility], retired, has an adult son (very involved), with no formal psychiatric history until February of 2020 when she had Delirium (seen by [facility] CL psych Team and treated with Zyprexa),  w/ PMH of HTN, DM, CHRIS on CPAP, RA p/w altered mental status. Per chart, patient was aggressive and combative in the room; per chart, son states that patient has no psychiatric history and change in behavior occurred 3 weeks ago acutely. DC to [facility] from [facility] on 3/27/20 presenting to the  ED today s/p having BRBPR about 4-5 hours prior with numerous episodes of bloody stool since then. Pt's son called 911.     EXAM: Patient is lying in bed, N95 mask on, diaphoretic, obese, c/o of pain from her hernia surgery, aware that she is getting a transfusion and able to state why. However, Patient has episodes when she is not able to maintain linear logical though process and has impaired reasoning. She has moments of clarity when she is linear and able to provide a reliable history. Focused on going to [facility] to see her surgeon (who repaired her hernia) but not able to appreciate the issue that her hernia operation is not the current problem at this time. Patient could not recall previous conversations with specialists who came to see her in the ED and said "I saw many" and "they talked to me like I was crazy so I said nothing" and could not recall any recommended procedures/interventions. Patient also could not recall when she had her hernia repair and said "sometime before the lock down".    ISTOP Reference #:[id number]   12/26/2019 05/04/2020 tramadol hcl er 300 mg tablet  30 30 Kristie Nails [employer] [facility] #[id number]   12/26/2019 04/02/2020 tramadol hcl er 300 mg tablet  30 30 Kristie Nails [employer] [facility][id number]   03/20/2020 03/27/2020 tramadol hcl 50 mg tablet  7 5 Zara Alcocer MD [employer] [facility][id number]   12/26/2019 12/31/2019 tramadol hcl er 300 mg tablet  30 30 Kristie Nails [employer] [id number]   10/30/2019 11/30/2019 tramadol hcl er 300 mg tablet  30 30 Kristie Nails [facility] #[id number]

## 2020-05-12 NOTE — CONSULT NOTE ADULT - PROBLEM SELECTOR RECOMMENDATION 9
P: Pain Control       Continue care as per ICU      Continue care as per GI possible Colonoscopy pending patient and family     approval      D/W with Dr. Coppola      Attending to Evaluate      Surgery to F/U

## 2020-05-12 NOTE — ED PROVIDER NOTE - PROGRESS NOTE DETAILS
Jonathan Weil, PGY3 - hemoglobin 6.7, c/w substantial blood loss. Patient does not have adequate insight into her condition to consent to blood, as evidenced by the significant effort required to convince her that IV placement and blood tests were necessary to evaluate her potentially life-threatening condition, and her seemingly unwavering conviction that her stool is dark because she had "fish and hot sauce" for dinner. Therefore we spoke w/ her son as a proxy for consent. He agreed to the blood transfusion. Also giving insulin for elevated K, though no worrisome ECG findings seen. Jonathan Weil, PGY3 - BP uptrending with IV crystalloid; still low but now 90s systolic compared to 70s on arrival. Mental status unchanged. Requesting analgesics for chronic back pain - takes tramadol at home. Jonathan Weil, PGY3 - CTA negative for radiographic evidence of hemorrhage, but does identify incidentally an emphysematous cystitis. Will cover with zosyn. MICU consulted.

## 2020-05-12 NOTE — ED ADULT NURSE NOTE - ED STAT RN HANDOFF DETAILS
Report received from [facility] at 7pm. Assessment available on KB. will continue to monitor. on cardiac monitor at bedside. pt is admitted and is awaiting bed assignment. LR infusing on pump Report received from [facility] at 7pm. Assessment available on [facility]. will continue to monitor. on cardiac monitor at bedside. pt is admitted and is awaiting bed assignment. LR infusing on pump. pt on nasal cannula. pt is asleep at this time

## 2020-05-12 NOTE — ED PROCEDURE NOTE - PROCEDURE ADDITIONAL DETAILS
20G IV placed to left upper arm under dynamic US guidance into a fully compressible, non-pulsatile vessel

## 2020-05-12 NOTE — ED ADULT NURSE REASSESSMENT NOTE - NS ED NURSE REASSESS COMMENT FT1
Pt's BP improved, tolerated 2nd unit of PRBC's no adverse reaction noted. Pt awaiting bed assignment. No apparent respiratory distress noted at this time.

## 2020-05-12 NOTE — ED PROVIDER NOTE - CRITICAL CARE PROVIDED
consultation with other physicians/direct patient care (not related to procedure)/additional history taking/interpretation of diagnostic studies/consult w/ pt's family directly relating to pts condition/documentation

## 2020-05-12 NOTE — ED PROVIDER NOTE - CLINICAL SUMMARY MEDICAL DECISION MAKING FREE TEXT BOX
Jonathan Weil, PGY3 - Active GI bleed, hypotensive but clinically tolerating it. Will start with fluid resuscitation, blood products if she remains hypotensive, CTA a/p if stable enough for CT to assess bleeding site.

## 2020-05-12 NOTE — H&P ADULT - ATTENDING COMMENTS
63 yo F hx  hypothyroidism, COPD/CHRIS, RA (on Prednisone), SBO s/p ex lap with lysis of adhesions c/b evisceration p/w acute GI bleed, hypotension w/ LA, hyperkalemia and CT findings of cystitis. Pt currently getting transfused prbc x2 and bp increasing. Start PPI, serial cbc. Plan for endosocpy once stable. Not actively bleeding. GI reccs appreciated.  Complete resuscitation and repeat LA. Maintain MAP.65. Empiric abx for cystitis leading to sepsis. f/u cx data. f/u Surgery reccs. Repeat K after medical management. Pt alert but is confused and seen by psych and has delirium in setting of multiple medical issues. Her children have expressed desire for cont aggressive care.

## 2020-05-12 NOTE — CONSULT NOTE ADULT - ASSESSMENT
HPI:  64 year old female with a history of hypothyroidism, HTN, IDDM, COPD/CHRIS, RA (on Prednisone), SBO s/p ex lap with lysis of adhesions c/b evisceration,  recent hospitalization in 3/2020 with encephalopathy found to be significantly hypothyroid (, without myxedema coma), small (stable) right sided SDH, UTI, catatonic reaction to VPA (now resolved) and DKA (resolved) and ongoing psychiatric issues. Presents today with GI bleeding. She started having BRBPR about 4-5 hours ago, with numerous episodes of bloody stool since then. She denies any other symptoms - denies dyspnea/chest pain/light headedness/dizziness. Her son notes this involved several episodes of diarrhea w/ bloody stool after an enema earlier in the day.      In ED: Sinus tach, hypotensive and sPO2 87% on RA.  Labs remarkable for anemia, SAM, hyperglycemia, lactate 6.1, K 6.3, TSH 14  CTA shows < from: CT Angio Abdomen and Pelvis w/ IV Cont (05.12.20 @ 13:37) >  Emphysematous cystitis. Air is also present in the uterine wall consistent with endometritis or myometritis. Recommend surgical/urologic and OB/GYN evaluation.  Hyperemic sigmoid mucosa may indicate colitis. Sigmoid diverticulosis without diverticulitis. (12 May 2020 14:50)    -------------- As Above ------- Patient seen earlier today  The patient is a poor historian. Presents with multiple episodes of bloody BM.  In the ER, patient noted ti be temporarily hypotensive. Painless. Denies anticoagulants or NSAIDs.   Last colonoscopy (?). In ER , patient noted to have elevated potassium level.   No further bloody BMs since last seen.   History of SBO repair    GI Bleed, probably lower - 1) blood transfusions 2) f/u labs 3) EGD / colonoscopy when medically stable ( elevated K) 4) surgical consult 4) ICU consult

## 2020-05-12 NOTE — H&P ADULT - ASSESSMENT
64 year old female with a history of hypothyroidism, HTN, IDDM, COPD/CHRIS, RA (on Prednisone), SBO s/p ex lap with lysis of adhesions c/b evisceration,  recent hospitalization in 3/2020 with encephalopathy found to be significantly hypothyroid (, without myxedema coma), small (stable) right sided SDH, UTI, catatonic reaction to VPA (now resolved) and DKA (resolved) and ongoing psychiatric issues. Presents today with GI bleed.    In ED: Sinus tach, hypotensive and sPO2 87% on RA.  Labs remarkable for anemia, SAM, hyperglycemia, lactate 6.1, K 6.3, TSH 14  CTA shows < from: CT Angio Abdomen and Pelvis w/ IV Cont (05.12.20 @ 13:37) >  Emphysematous cystitis. Air is also present in the uterine wall consistent with endometritis or myometritis. Recommend surgical/urologic and OB/GYN evaluation.    Plan  # Neuro  A&Ox3 however quarrelsome refusing some care  history of psych issues will continue Zyprexa 2.5mg BID    #CV  Strict hemodynamic monitoring. Currently sinus tach . EKG unremarkable.  BP borderline.   Will hold home BP meds  Continue with IVF and blood resuscitation.  Goal MAP>65    #Resp  Presented with sPO2 87% on RA. Currently 100% on 4L NC  Continue with oxygen supplementation and monitor    #GI  GI Bleed H/H today 6.7/22.6  Transfuse PRBC for goal hgb >7.0   Check post transfusion CBC  Trend CBC   Stool for occult blood   Protonix 40 mg IV BID  Hold anticoagulants till cleared by GI  Follow GI recommendations   NPO    #  Pt has elevated creatinine, Baseline creatinine 1: Today’s creatinine: 1.39  SAM likely due to prerenal  Trend BUN/Cr.  Strict I/O  Avoid neprhotoxic agents. 64 year old female with a history of hypothyroidism, HTN, IDDM, COPD/CHRIS, RA (on Prednisone), SBO s/p ex lap with lysis of adhesions c/b evisceration,  recent hospitalization in 3/2020 with encephalopathy found to be significantly hypothyroid (, without myxedema coma), small (stable) right sided SDH, UTI, catatonic reaction to VPA (now resolved) and DKA (resolved) and ongoing psychiatric issues. Presents today with GI bleed.    In ED: Sinus tach, hypotensive and sPO2 87% on RA.  Labs remarkable for anemia, SAM, hyperglycemia, lactate 6.1, K 6.3, TSH 14  CTA shows < from: CT Angio Abdomen and Pelvis w/ IV Cont (05.12.20 @ 13:37) >  Emphysematous cystitis. Air is also present in the uterine wall consistent with endometritis or myometritis. Recommend surgical/urologic and OB/GYN evaluation.    Plan  # Neuro  A&Ox3 however quarrelsome refusing some care  history of psych issues will continue Zyprexa 2.5mg BID    #CV  Strict hemodynamic monitoring. Currently sinus tach . EKG unremarkable.  BP borderline.   Will hold home BP meds  Continue with IVF and blood resuscitation.  Goal MAP>65    #Resp  Presented with sPO2 87% on RA. Currently 100% on 4L NC  Continue with oxygen supplementation and monitor    #GI / Heme  GI Bleed H/H today 6.7/22.6  Transfuse PRBC for goal hgb >7.0   Check post transfusion CBC  Trend CBC   Stool for occult blood   Protonix 40 mg IV BID  Hold anticoagulants till cleared by GI  Follow GI recommendations   NPO    #  Pt has elevated creatinine, Baseline creatinine 1: Today’s creatinine: 1.39  SAM likely due to prerenal  Trend BUN/Cr.  Strict I/O  Avoid neprhotoxic agents.    #Endo  Hyperglycemia 400's will start lantus and sliding scale  TSH 14 c/w synthroid  Endocrine consult    #ID  Leukocytosis and afebrile continue to monitor  Continue w/ Zosyn for broad spectrum coverage 5 days    #Skin  Ecchymosis and swelling of extremities  continue to monitor    #Lines/Tubes  2 large bore PIV  iraheta    #Dispo  ICU 64 year old female with a history of hypothyroidism, HTN, IDDM, COPD/CHRIS, RA (on Prednisone), SBO s/p ex lap with lysis of adhesions c/b evisceration,  recent hospitalization in 3/2020 with encephalopathy found to be significantly hypothyroid (, without myxedema coma), small (stable) right sided SDH, UTI, catatonic reaction to VPA (now resolved) and DKA (resolved) and ongoing psychiatric issues. Presents today with GI bleed.    In ED: Sinus tach, hypotensive and sPO2 87% on RA.  Labs remarkable for anemia, SAM, hyperglycemia, lactate 6.1, K 6.3, TSH 14  CTA shows < from: CT Angio Abdomen and Pelvis w/ IV Cont (05.12.20 @ 13:37) >  Emphysematous cystitis. Air is also present in the uterine wall consistent with endometritis or myometritis. Recommend surgical/urologic and OB/GYN evaluation.    Plan  # Neuro  A&Ox3 however quarrelsome refusing some care  history of psych issues will continue Zyprexa 2.5mg BID    #CV  Strict hemodynamic monitoring. Currently sinus tach . EKG unremarkable.  BP borderline.   Will hold home BP meds  Continue with IVF and blood resuscitation.  Goal MAP>65    #Resp  Presented with sPO2 87% on RA. Currently 100% on 4L NC  Continue with oxygen supplementation and monitor    #GI / Heme  GI Bleed H/H today 6.7/22.6  Transfuse PRBC for goal hgb >7.0   Check post transfusion CBC  Trend CBC   Stool for occult blood   Protonix 40 mg IV BID  Hold anticoagulants till cleared by GI  Follow GI recommendations   NPO    #  Pt has elevated creatinine, Baseline creatinine 1: Today’s creatinine: 1.39  SAM likely due to prerenal  Trend BUN/Cr.  Strict I/O  Avoid neprhotoxic agents.    #Endo  Hyperglycemia 400's will start lantus and sliding scale  TSH 14 c/w synthroid  Endocrine consult    #ID  Leukocytosis and afebrile continue to monitor  f/u cultures  Continue w/ Zosyn for broad spectrum coverage 7 days.    #Skin  Ecchymosis and swelling of extremities  continue to monitor    #Lines/Tubes  2 large bore PIV  iraheta    #Dispo  ICU

## 2020-05-12 NOTE — BEHAVIORAL HEALTH ASSESSMENT NOTE - DESCRIPTION
HTN, IDDM, COPD/CHRIS, RA (on Prednisone), SBO s/p ex lap with lysis of adhesions c/b evisceration; hypothyroidism; hx of C section

## 2020-05-12 NOTE — BEHAVIORAL HEALTH ASSESSMENT NOTE - NSBHCHARTREVIEWVS_PSY_A_CORE FT
T(C): 36.5 (05-12-20 @ 15:30), Max: 37.3 (05-12-20 @ 10:53)  HR: 75 (05-12-20 @ 15:30) (75 - 101)  BP: 94/43 (05-12-20 @ 13:00) (94/43 - 94/43)  RR: 20 (05-12-20 @ 15:30) (20 - 20)  SpO2: 98% (05-12-20 @ 15:30) (87% - 98%)

## 2020-05-12 NOTE — CONSULT NOTE ADULT - ATTENDING COMMENTS
Ms. Morrow was examined. She presents to the ED after approximately 24 hours of bleeding per rectum. At the time of this examination she was receiving one unit of PRBCs -120's HRs'90s, mentating well. She has never had a lower GI Bleed previously. She has not had a colonoscopy or endoscopy previously. She is "anemic and takes iron supplements" Unknown etiology of her anemia. She underwent a exploratory laparotomy lysis of adhesions by Dr. Grace in 2018 complicated by bowel evisceration, return to the OR for abdominal wall closure. On examination she is lethargic, her abdomen is obese, soft, nontender, nondistended, midline dressing in place, pinpoint area of granulation tissue. Refused rectal examination. Unable to appropriately assess given her body habitus and uncooperativeness. Cross sectional imaging reviewed, no evidence of active extravasation. Sigmoid colitis and diverticulosis. No acute surgical intervention warranted at this time. Recommend transfuse as warranted, Q6 hour CBCs until stable. Gastroenterology evaluation. IF she were to acutely decompensate, IR consultation for possible angio/embo vs repeat CTA. If bleeding cannot be localized by these various modalities and she were symptomatic from acute blood loss will consider surgical intervention. Discussed with Dr. Coppola.

## 2020-05-12 NOTE — ED PROVIDER NOTE - PHYSICAL EXAMINATION
General: A&Ox3, well nourished, no acute distress  HENT: NC/AT. Posterior oropharynx clear. Patent airway  Eyes: PERRL, EOMI  CV: RRR, no m/r/g. 2+ peripheral pulses. Extremities are warm and well perfused.  Respiratory: CTAB no w/r/r. No respiratory distress.  Abdominal: soft, non-distended, non-tender, dressing over midline wound c/d/i. Morbidly obese. Large amount of red stool in diaper.  Neuro: No focal deficits  Skin: no rashes  Psych: normal mood and affect

## 2020-05-12 NOTE — BEHAVIORAL HEALTH ASSESSMENT NOTE - SUMMARY
- presentation consistent with bout of delirium  - Patient has NO capacity to make her medical decisions at this time  - Pt's decision maker is an adult child who is closest/actively involved/knows Pt's wishes the best

## 2020-05-12 NOTE — BEHAVIORAL HEALTH ASSESSMENT NOTE - SUICIDE PROTECTIVE FACTORS
Responsibility to family and others/Identifies reasons for living/Has future plans/Supportive social network of family or friends/Positive therapeutic relationships/Cultural, spiritual and/or moral attitudes against suicide

## 2020-05-12 NOTE — ED PROVIDER NOTE - CARE PLAN
Principal Discharge DX:	GI bleed  Secondary Diagnosis:	Hyperkalemia  Secondary Diagnosis:	Hyperglycemia  Secondary Diagnosis:	SAM (acute kidney injury)

## 2020-05-12 NOTE — BEHAVIORAL HEALTH ASSESSMENT NOTE - OTHER PAST PSYCHIATRIC HISTORY (INCLUDE DETAILS REGARDING ONSET, COURSE OF ILLNESS, INPATIENT/OUTPATIENT TREATMENT)
3/18/20 most recent CL Psychiatry consult note from [facility]: " 63 yo AAF, single, lives with a 24/7 HHA in a private home, has an adult son, with extensive medical history including taking standing corticosteroids daily, + chronic pain on opiates; BIB EMS from home  for AMS patient with similar admission Jan 12 to [place] where she refused all treatment, assessed to not have capacity, however subsequently discharged AMA at family's (HCP)  request.   Psychiatry at  recommended urine studies which were never completed prior to discharge.   at time of [place] admission patient's TSH was 29, on this admission assessed to be 101.   upon presentation patient reported to be combative in ED (threw object) since then has been irritable obstinate with inconsistent orientation. Pt appears to be tolerating zyprexa 2.5 mg po BID for mood stability, no si/hi, more calm today, however does have periods of mood lability symptoms."

## 2020-05-12 NOTE — CONSULT NOTE ADULT - SUBJECTIVE AND OBJECTIVE BOX
GENERAL SURGERY CONSULT NOTE    Patient is a 64y old  Female who presents with a chief complaint of GI Bleed, PMH hypothyroidism, HTN, IDDM, COPD/CHRIS, RA (on Prednisone), SBO s/p ex lap with lysis of adhesions c/b evisceration p/w GI bleeding. She started having BRBPR about 4-5 hours ago, with numerous episodes of bloody stool since then. She denies any other symptoms - denies dyspnea/chest pain/light headedness/dizziness. Her son notes this involved several episodes of diarrhea w/ bloody stool after an enema earlier in the day.        HPI:      PAST MEDICAL & SURGICAL HISTORY:  Diverticulosis  RA (Rheumatoid Arthritis)  Tooth Abscess  Arthritis  Cigarette Smoker  Chronic Asthma  Adult Hypothyroidism  Borderline Diabetes Mellitus  High Cholesterol  H/O: HTN  Wrist Disorder: S/P Surgery  History of  Section      Review of Systems:    I have reviewed 9 systems with the patient and the only positive findings were     MEDICATIONS  (STANDING):  OLANZapine Disintegrating Tablet 5 milliGRAM(s) Oral Once  piperacillin/tazobactam IVPB. 3.375 Gram(s) IV Intermittent Once  traMADol 50 milliGRAM(s) Oral Once    MEDICATIONS  (PRN):      Allergies    Depakote (Other)  Seroquel (Other (Severe))    Intolerances        SOCIAL HISTORY          Smoking: Yes [ ]  No [ ]   ______pk yrs          ETOH  Yes [ ]  No [ ]  Social [ ]          DRUGS:  Yes [ ]  No [ ]  if so what______________    FAMILY HISTORY:  No pertinent family history in first degree relatives      Vital Signs Last 24 Hrs  T(C): 37.3 (12 May 2020 10:53), Max: 37.3 (12 May 2020 10:53)  T(F): 99.1 (12 May 2020 10:53), Max: 99.1 (12 May 2020 10:53)  HR: 88 (12 May 2020 13:00) (88 - 101)  BP: 94/43 (12 May 2020 13:00) (94/43 - 94/43)  BP(mean): --  RR: 20 (12 May 2020 13:00) (20 - 20)  SpO2: 98% (12 May 2020 13:00) (87% - 98%)    Physical Exam: Patient Refusing any Physical Exam at this time         LABS:                        6.7    15.42 )-----------( 318      ( 12 May 2020 12:00 )             22.6         145  |  110<H>  |  46<H>  ----------------------------<  482<HH>  6.3<HH>   |  23  |  1.39<H>    Ca    7.5<L>      12 May 2020 12:00    TPro  5.5<L>  /  Alb  2.2<L>  /  TBili  0.2  /  DBili  x   /  AST  26  /  ALT  38  /  AlkPhos  76      PT/INR - ( 12 May 2020 12:00 )   PT: 12.1 sec;   INR: 1.08 ratio         PTT - ( 12 May 2020 12:00 )  PTT:23.0 sec

## 2020-05-13 LAB
A1C WITH ESTIMATED AVERAGE GLUCOSE RESULT: 6.5 % — HIGH (ref 4–5.6)
ALBUMIN SERPL ELPH-MCNC: 2.2 G/DL — LOW (ref 3.3–5)
ALP SERPL-CCNC: 65 U/L — SIGNIFICANT CHANGE UP (ref 40–120)
ALT FLD-CCNC: 42 U/L — SIGNIFICANT CHANGE UP (ref 12–78)
ANION GAP SERPL CALC-SCNC: 10 MMOL/L — SIGNIFICANT CHANGE UP (ref 5–17)
ANION GAP SERPL CALC-SCNC: 7 MMOL/L — SIGNIFICANT CHANGE UP (ref 5–17)
APPEARANCE UR: ABNORMAL
AST SERPL-CCNC: 23 U/L — SIGNIFICANT CHANGE UP (ref 15–37)
BASOPHILS # BLD AUTO: 0.08 K/UL — SIGNIFICANT CHANGE UP (ref 0–0.2)
BASOPHILS NFR BLD AUTO: 0.6 % — SIGNIFICANT CHANGE UP (ref 0–2)
BILIRUB SERPL-MCNC: 0.3 MG/DL — SIGNIFICANT CHANGE UP (ref 0.2–1.2)
BILIRUB UR-MCNC: NEGATIVE — SIGNIFICANT CHANGE UP
BUN SERPL-MCNC: 56 MG/DL — HIGH (ref 7–23)
BUN SERPL-MCNC: 57 MG/DL — HIGH (ref 7–23)
CALCIUM SERPL-MCNC: 7.9 MG/DL — LOW (ref 8.5–10.1)
CALCIUM SERPL-MCNC: 8 MG/DL — LOW (ref 8.5–10.1)
CHLORIDE SERPL-SCNC: 105 MMOL/L — SIGNIFICANT CHANGE UP (ref 96–108)
CHLORIDE SERPL-SCNC: 107 MMOL/L — SIGNIFICANT CHANGE UP (ref 96–108)
CO2 SERPL-SCNC: 23 MMOL/L — SIGNIFICANT CHANGE UP (ref 22–31)
CO2 SERPL-SCNC: 30 MMOL/L — SIGNIFICANT CHANGE UP (ref 22–31)
COLOR SPEC: YELLOW — SIGNIFICANT CHANGE UP
CREAT SERPL-MCNC: 1.7 MG/DL — HIGH (ref 0.5–1.3)
CREAT SERPL-MCNC: 1.81 MG/DL — HIGH (ref 0.5–1.3)
DIFF PNL FLD: ABNORMAL
EOSINOPHIL # BLD AUTO: 0.05 K/UL — SIGNIFICANT CHANGE UP (ref 0–0.5)
EOSINOPHIL NFR BLD AUTO: 0.4 % — SIGNIFICANT CHANGE UP (ref 0–6)
EPI CELLS # UR: ABNORMAL
ESTIMATED AVERAGE GLUCOSE: 140 MG/DL — HIGH (ref 68–114)
GLUCOSE BLDC GLUCOMTR-MCNC: 112 MG/DL — HIGH (ref 70–99)
GLUCOSE BLDC GLUCOMTR-MCNC: 171 MG/DL — HIGH (ref 70–99)
GLUCOSE BLDC GLUCOMTR-MCNC: 256 MG/DL — HIGH (ref 70–99)
GLUCOSE BLDC GLUCOMTR-MCNC: 316 MG/DL — HIGH (ref 70–99)
GLUCOSE BLDC GLUCOMTR-MCNC: 64 MG/DL — LOW (ref 70–99)
GLUCOSE BLDC GLUCOMTR-MCNC: 79 MG/DL — SIGNIFICANT CHANGE UP (ref 70–99)
GLUCOSE BLDC GLUCOMTR-MCNC: 85 MG/DL — SIGNIFICANT CHANGE UP (ref 70–99)
GLUCOSE SERPL-MCNC: 106 MG/DL — HIGH (ref 70–99)
GLUCOSE SERPL-MCNC: 263 MG/DL — HIGH (ref 70–99)
GLUCOSE UR QL: NEGATIVE MG/DL — SIGNIFICANT CHANGE UP
HCT VFR BLD CALC: 30.5 % — LOW (ref 34.5–45)
HGB BLD-MCNC: 9.6 G/DL — LOW (ref 11.5–15.5)
IMM GRANULOCYTES NFR BLD AUTO: 1.3 % — SIGNIFICANT CHANGE UP (ref 0–1.5)
KETONES UR-MCNC: NEGATIVE — SIGNIFICANT CHANGE UP
LACTATE SERPL-SCNC: 1 MMOL/L — SIGNIFICANT CHANGE UP (ref 0.7–2)
LEUKOCYTE ESTERASE UR-ACNC: ABNORMAL
LYMPHOCYTES # BLD AUTO: 18.9 % — SIGNIFICANT CHANGE UP (ref 13–44)
LYMPHOCYTES # BLD AUTO: 2.6 K/UL — SIGNIFICANT CHANGE UP (ref 1–3.3)
MAGNESIUM SERPL-MCNC: 2.4 MG/DL — SIGNIFICANT CHANGE UP (ref 1.6–2.6)
MAGNESIUM SERPL-MCNC: 2.6 MG/DL — SIGNIFICANT CHANGE UP (ref 1.6–2.6)
MCHC RBC-ENTMCNC: 27.1 PG — SIGNIFICANT CHANGE UP (ref 27–34)
MCHC RBC-ENTMCNC: 31.5 GM/DL — LOW (ref 32–36)
MCV RBC AUTO: 86.2 FL — SIGNIFICANT CHANGE UP (ref 80–100)
MONOCYTES # BLD AUTO: 1.37 K/UL — HIGH (ref 0–0.9)
MONOCYTES NFR BLD AUTO: 9.9 % — SIGNIFICANT CHANGE UP (ref 2–14)
NEUTROPHILS # BLD AUTO: 9.5 K/UL — HIGH (ref 1.8–7.4)
NEUTROPHILS NFR BLD AUTO: 68.9 % — SIGNIFICANT CHANGE UP (ref 43–77)
NITRITE UR-MCNC: NEGATIVE — SIGNIFICANT CHANGE UP
NRBC # BLD: 0 /100 WBCS — SIGNIFICANT CHANGE UP (ref 0–0)
PH UR: 5 — SIGNIFICANT CHANGE UP (ref 5–8)
PHOSPHATE SERPL-MCNC: 5.6 MG/DL — HIGH (ref 2.5–4.5)
PHOSPHATE SERPL-MCNC: 6 MG/DL — HIGH (ref 2.5–4.5)
PLATELET # BLD AUTO: 206 K/UL — SIGNIFICANT CHANGE UP (ref 150–400)
POTASSIUM SERPL-MCNC: 4.6 MMOL/L — SIGNIFICANT CHANGE UP (ref 3.5–5.3)
POTASSIUM SERPL-MCNC: 6.4 MMOL/L — CRITICAL HIGH (ref 3.5–5.3)
POTASSIUM SERPL-SCNC: 4.6 MMOL/L — SIGNIFICANT CHANGE UP (ref 3.5–5.3)
POTASSIUM SERPL-SCNC: 6.4 MMOL/L — CRITICAL HIGH (ref 3.5–5.3)
PROT SERPL-MCNC: 5.2 GM/DL — LOW (ref 6–8.3)
PROT UR-MCNC: 500 MG/DL
RBC # BLD: 3.54 M/UL — LOW (ref 3.8–5.2)
RBC # FLD: 26.5 % — HIGH (ref 10.3–14.5)
RBC CASTS # UR COMP ASSIST: ABNORMAL /HPF (ref 0–4)
SODIUM SERPL-SCNC: 140 MMOL/L — SIGNIFICANT CHANGE UP (ref 135–145)
SODIUM SERPL-SCNC: 142 MMOL/L — SIGNIFICANT CHANGE UP (ref 135–145)
SP GR SPEC: 1.01 — SIGNIFICANT CHANGE UP (ref 1.01–1.02)
T PALLIDUM AB TITR SER: NEGATIVE — SIGNIFICANT CHANGE UP
UROBILINOGEN FLD QL: NEGATIVE MG/DL — SIGNIFICANT CHANGE UP
WBC # BLD: 13.78 K/UL — HIGH (ref 3.8–10.5)
WBC # FLD AUTO: 13.78 K/UL — HIGH (ref 3.8–10.5)
WBC UR QL: >50

## 2020-05-13 PROCEDURE — 99231 SBSQ HOSP IP/OBS SF/LOW 25: CPT

## 2020-05-13 PROCEDURE — 99232 SBSQ HOSP IP/OBS MODERATE 35: CPT

## 2020-05-13 RX ORDER — SODIUM CHLORIDE 9 MG/ML
500 INJECTION, SOLUTION INTRAVENOUS ONCE
Refills: 0 | Status: DISCONTINUED | OUTPATIENT
Start: 2020-05-13 | End: 2020-05-17

## 2020-05-13 RX ORDER — OLANZAPINE 15 MG/1
2.5 TABLET, FILM COATED ORAL
Refills: 0 | Status: DISCONTINUED | OUTPATIENT
Start: 2020-05-13 | End: 2020-05-17

## 2020-05-13 RX ORDER — INSULIN LISPRO 100/ML
VIAL (ML) SUBCUTANEOUS
Refills: 0 | Status: DISCONTINUED | OUTPATIENT
Start: 2020-05-13 | End: 2020-05-17

## 2020-05-13 RX ORDER — INSULIN LISPRO 100/ML
VIAL (ML) SUBCUTANEOUS EVERY 4 HOURS
Refills: 0 | Status: DISCONTINUED | OUTPATIENT
Start: 2020-05-13 | End: 2020-05-13

## 2020-05-13 RX ORDER — INSULIN GLARGINE 100 [IU]/ML
35 INJECTION, SOLUTION SUBCUTANEOUS EVERY MORNING
Refills: 0 | Status: DISCONTINUED | OUTPATIENT
Start: 2020-05-13 | End: 2020-05-15

## 2020-05-13 RX ORDER — DEXTROSE 50 % IN WATER 50 %
15 SYRINGE (ML) INTRAVENOUS ONCE
Refills: 0 | Status: DISCONTINUED | OUTPATIENT
Start: 2020-05-13 | End: 2020-05-17

## 2020-05-13 RX ORDER — INSULIN LISPRO 100/ML
VIAL (ML) SUBCUTANEOUS AT BEDTIME
Refills: 0 | Status: DISCONTINUED | OUTPATIENT
Start: 2020-05-13 | End: 2020-05-17

## 2020-05-13 RX ORDER — DEXTROSE 50 % IN WATER 50 %
25 SYRINGE (ML) INTRAVENOUS ONCE
Refills: 0 | Status: DISCONTINUED | OUTPATIENT
Start: 2020-05-13 | End: 2020-05-17

## 2020-05-13 RX ORDER — DEXTROSE 50 % IN WATER 50 %
12.5 SYRINGE (ML) INTRAVENOUS ONCE
Refills: 0 | Status: DISCONTINUED | OUTPATIENT
Start: 2020-05-13 | End: 2020-05-17

## 2020-05-13 RX ORDER — DEXTROSE 50 % IN WATER 50 %
25 SYRINGE (ML) INTRAVENOUS ONCE
Refills: 0 | Status: COMPLETED | OUTPATIENT
Start: 2020-05-13 | End: 2020-05-13

## 2020-05-13 RX ORDER — INSULIN HUMAN 100 [IU]/ML
10 INJECTION, SOLUTION SUBCUTANEOUS ONCE
Refills: 0 | Status: COMPLETED | OUTPATIENT
Start: 2020-05-13 | End: 2020-05-13

## 2020-05-13 RX ORDER — CALCIUM GLUCONATE 100 MG/ML
1 VIAL (ML) INTRAVENOUS ONCE
Refills: 0 | Status: COMPLETED | OUTPATIENT
Start: 2020-05-13 | End: 2020-05-13

## 2020-05-13 RX ORDER — DEXTROSE 50 % IN WATER 50 %
12.5 SYRINGE (ML) INTRAVENOUS ONCE
Refills: 0 | Status: COMPLETED | OUTPATIENT
Start: 2020-05-13 | End: 2020-05-13

## 2020-05-13 RX ADMIN — PANTOPRAZOLE SODIUM 40 MILLIGRAM(S): 20 TABLET, DELAYED RELEASE ORAL at 06:19

## 2020-05-13 RX ADMIN — Medication 125 MEQ/KG/HR: at 00:52

## 2020-05-13 RX ADMIN — Medication 200 MICROGRAM(S): at 08:27

## 2020-05-13 RX ADMIN — PIPERACILLIN AND TAZOBACTAM 25 GRAM(S): 4; .5 INJECTION, POWDER, LYOPHILIZED, FOR SOLUTION INTRAVENOUS at 06:51

## 2020-05-13 RX ADMIN — Medication 12.5 GRAM(S): at 22:12

## 2020-05-13 RX ADMIN — SODIUM CHLORIDE 500 MILLILITER(S): 9 INJECTION, SOLUTION INTRAVENOUS at 00:07

## 2020-05-13 RX ADMIN — Medication 25 GRAM(S): at 06:22

## 2020-05-13 RX ADMIN — Medication 8: at 01:47

## 2020-05-13 RX ADMIN — PIPERACILLIN AND TAZOBACTAM 25 GRAM(S): 4; .5 INJECTION, POWDER, LYOPHILIZED, FOR SOLUTION INTRAVENOUS at 13:45

## 2020-05-13 RX ADMIN — ALBUTEROL 2.5 MILLIGRAM(S): 90 AEROSOL, METERED ORAL at 00:42

## 2020-05-13 RX ADMIN — SODIUM ZIRCONIUM CYCLOSILICATE 10 GRAM(S): 10 POWDER, FOR SUSPENSION ORAL at 22:05

## 2020-05-13 RX ADMIN — Medication 100 GRAM(S): at 00:31

## 2020-05-13 RX ADMIN — PIPERACILLIN AND TAZOBACTAM 25 GRAM(S): 4; .5 INJECTION, POWDER, LYOPHILIZED, FOR SOLUTION INTRAVENOUS at 00:18

## 2020-05-13 RX ADMIN — SODIUM ZIRCONIUM CYCLOSILICATE 10 GRAM(S): 10 POWDER, FOR SUSPENSION ORAL at 13:46

## 2020-05-13 RX ADMIN — Medication 6: at 06:19

## 2020-05-13 RX ADMIN — INSULIN HUMAN 10 UNIT(S): 100 INJECTION, SOLUTION SUBCUTANEOUS at 06:22

## 2020-05-13 RX ADMIN — Medication 50 MILLILITER(S): at 00:19

## 2020-05-13 RX ADMIN — INSULIN HUMAN 10 UNIT(S): 100 INJECTION, SOLUTION SUBCUTANEOUS at 00:16

## 2020-05-13 RX ADMIN — PANTOPRAZOLE SODIUM 40 MILLIGRAM(S): 20 TABLET, DELAYED RELEASE ORAL at 17:36

## 2020-05-13 RX ADMIN — INSULIN GLARGINE 35 UNIT(S): 100 INJECTION, SOLUTION SUBCUTANEOUS at 08:25

## 2020-05-13 RX ADMIN — PIPERACILLIN AND TAZOBACTAM 25 GRAM(S): 4; .5 INJECTION, POWDER, LYOPHILIZED, FOR SOLUTION INTRAVENOUS at 22:05

## 2020-05-13 RX ADMIN — Medication 2: at 10:16

## 2020-05-13 RX ADMIN — Medication 100 GRAM(S): at 06:18

## 2020-05-13 RX ADMIN — OLANZAPINE 2.5 MILLIGRAM(S): 15 TABLET, FILM COATED ORAL at 06:19

## 2020-05-13 RX ADMIN — CHLORHEXIDINE GLUCONATE 1 APPLICATION(S): 213 SOLUTION TOPICAL at 10:15

## 2020-05-13 RX ADMIN — SODIUM ZIRCONIUM CYCLOSILICATE 10 GRAM(S): 10 POWDER, FOR SUSPENSION ORAL at 06:19

## 2020-05-13 NOTE — PROGRESS NOTE ADULT - ASSESSMENT
HPI:  64 year old female with a history of hypothyroidism, HTN, IDDM, COPD/CHRIS, RA (on Prednisone), SBO s/p ex lap with lysis of adhesions c/b evisceration,  recent hospitalization in 3/2020 with encephalopathy found to be significantly hypothyroid (, without myxedema coma), small (stable) right sided SDH, UTI, catatonic reaction to VPA (now resolved) and DKA (resolved) and ongoing psychiatric issues. Presents today with GI bleeding. She started having BRBPR about 4-5 hours ago, with numerous episodes of bloody stool since then. She denies any other symptoms - denies dyspnea/chest pain/light headedness/dizziness. Her son notes this involved several episodes of diarrhea w/ bloody stool after an enema earlier in the day.      In ED: Sinus tach, hypotensive and sPO2 87% on RA.  Labs remarkable for anemia, SAM, hyperglycemia, lactate 6.1, K 6.3, TSH 14  CTA shows < from: CT Angio Abdomen and Pelvis w/ IV Cont (05.12.20 @ 13:37) >  Emphysematous cystitis. Air is also present in the uterine wall consistent with endometritis or myometritis. Recommend surgical/urologic and OB/GYN evaluation.  Hyperemic sigmoid mucosa may indicate colitis. Sigmoid diverticulosis without diverticulitis. (12 May 2020 14:50)    -------------- As Above ------- Patient seen earlier today  The patient is a poor historian. Presents with multiple episodes of bloody BM.  In the ER, patient noted ti be temporarily hypotensive. Painless. Denies anticoagulants or NSAIDs.   Last colonoscopy (?). In ER , patient noted to have elevated potassium level.   No further bloody BMs since last seen.   History of SBO repair    GI Bleed, probably lower, S/P 2 units PRBC 6.7 -- > 9.1 1) clear liquid diet 2) f/u labs 3) EGD / colonoscopy when medically stable ( elevated K)

## 2020-05-13 NOTE — PROGRESS NOTE BEHAVIORAL HEALTH - NSBHFUPINTERVALHXFT_PSY_A_CORE
Patient seen in ICU; lying in bed, looks comfortable, somnolent and not able ot wake up enough to engage in conversation.

## 2020-05-13 NOTE — PROGRESS NOTE ADULT - SUBJECTIVE AND OBJECTIVE BOX
HPI:  64 year old female with a history of hypothyroidism, HTN, IDDM, COPD/CHRIS, RA (on Prednisone), SBO s/p ex lap with lysis of adhesions c/b evisceration,  recent hospitalization in 3/2020 with encephalopathy found to be significantly hypothyroid (, without myxedema coma), small (stable) right sided SDH, UTI, catatonic reaction to VPA (now resolved) and DKA (resolved) and ongoing psychiatric issues. Presents today with GI bleeding. She started having BRBPR about 4-5 hours ago, with numerous episodes of bloody stool since then. She denies any other symptoms - denies dyspnea/chest pain/light headedness/dizziness. Her son notes this involved several episodes of diarrhea w/ bloody stool after an enema earlier in the day.    In ED: Sinus tach, hypotensive and sPO2 87% on RA.  Labs remarkable for anemia, SAM, hyperglycemia, lactate 6.1, K 6.3, TSH 14  CTA shows < from: CT Angio Abdomen and Pelvis w/ IV Cont (05.12.20 @ 13:37) >  Emphysematous cystitis. Air is also present in the uterine wall consistent with endometritis or myometritis. Recommend surgical/urologic and OB/GYN evaluation.  Hyperemic sigmoid mucosa may indicate colitis. Sigmoid diverticulosis without diverticulitis. (12 May 2020 14:50)      24 hr events: No acute events.     ## ROS:  See above ROS otherwise negative    ## Vitals  ICU Vital Signs Last 24 Hrs  T(C): 36.3 (13 May 2020 04:04), Max: 37.3 (12 May 2020 10:53)  T(F): 97.3 (13 May 2020 04:04), Max: 99.1 (12 May 2020 10:53)  HR: 87 (13 May 2020 07:00) (75 - 101)  BP: 119/97 (13 May 2020 07:00) (76/56 - 119/97)  BP(mean): 101 (13 May 2020 07:00) (61 - 101)  ABP: --  ABP(mean): --  RR: 10 (13 May 2020 07:00) (9 - 20)  SpO2: 100% (13 May 2020 07:00) (87% - 100%)      ## Physical Exam:  Gen: Morbidly obese F lying comfortably in bed, NAD  HEENT: NC/AT sclerae anicteric  Resp: No increased WOB, CTAB  CV: S1, S2   Abd: Soft, + BS  Ext: WWP  Neuro: Awake, alert, moves all extremities    ## Vent Data      ## Labs:  Chem:  05-13    140  |  107  |  56<H>  ----------------------------<  263<H>  6.4<HH>   |  23  |  1.81<H>    Ca    7.9<L>      13 May 2020 04:09  Phos  5.6     05-13  Mg     2.6     05-13    TPro  5.2<L>  /  Alb  2.2<L>  /  TBili  0.3  /  DBili  x   /  AST  23  /  ALT  42  /  AlkPhos  65  05-13    LIVER FUNCTIONS - ( 13 May 2020 04:09 )  Alb: 2.2 g/dL / Pro: 5.2 gm/dL / ALK PHOS: 65 U/L / ALT: 42 U/L / AST: 23 U/L / GGT: x           CBC:                        9.6    13.78 )-----------( 206      ( 13 May 2020 04:09 )             30.5     Coags:  PT/INR - ( 12 May 2020 12:00 )   PT: 12.1 sec;   INR: 1.08 ratio         PTT - ( 12 May 2020 12:00 )  PTT:23.0 sec        ## Cardiac  CARDIAC MARKERS ( 12 May 2020 12:00 )  <.015 ng/mL / x     / x     / x     / x            ## Blood Gas      #I/Os  I&O's Detail    12 May 2020 07:01  -  13 May 2020 07:00  --------------------------------------------------------  IN:    IV PiggyBack: 400 mL    Lactated Ringers IV Bolus: 1000 mL    sodium bicarbonate  Infusion: 1125 mL  Total IN: 2525 mL    OUT:    Indwelling Catheter - Urethral: 100 mL  Total OUT: 100 mL    Total NET: 2425 mL          ## Imaging:    ## Medications:  MEDICATIONS  (STANDING):  chlorhexidine 4% Liquid 1 Application(s) Topical <User Schedule>  dextrose 5%. 1000 milliLiter(s) (50 mL/Hr) IV Continuous <Continuous>  dextrose 50% Injectable 12.5 Gram(s) IV Push once  insulin glargine Injectable (LANTUS) 35 Unit(s) SubCutaneous every morning  insulin lispro (HumaLOG) corrective regimen sliding scale   SubCutaneous every 4 hours  lactated ringers Bolus 500 milliLiter(s) IV Bolus once  levothyroxine 200 MICROGram(s) Oral daily  OLANZapine 2.5 milliGRAM(s) Oral two times a day  pantoprazole  Injectable 40 milliGRAM(s) IV Push two times a day  piperacillin/tazobactam IVPB.. 3.375 Gram(s) IV Intermittent every 8 hours  sodium zirconium cyclosilicate 10 Gram(s) Oral every 8 hours    MEDICATIONS  (PRN):  dextrose 40% Gel 15 Gram(s) Oral once PRN Blood Glucose LESS THAN 70 milliGRAM(s)/deciliter  glucagon  Injectable 1 milliGRAM(s) IntraMuscular once PRN Glucose LESS THAN 70 milligrams/deciliter HPI:  64 year old female with a history of hypothyroidism, HTN, IDDM, COPD/CHRIS, RA (on Prednisone), SBO s/p ex lap with lysis of adhesions c/b evisceration,  recent hospitalization in 3/2020 with encephalopathy found to be significantly hypothyroid (, without myxedema coma), small (stable) right sided SDH, UTI, catatonic reaction to VPA (now resolved) and DKA (resolved) and ongoing psychiatric issues. Presents today with GI bleeding. She started having BRBPR about 4-5 hours ago, with numerous episodes of bloody stool since then. She denies any other symptoms - denies dyspnea/chest pain/light headedness/dizziness. Her son notes this involved several episodes of diarrhea w/ bloody stool after an enema earlier in the day.    In ED: Sinus tach, hypotensive and sPO2 87% on RA.  Labs remarkable for anemia, SAM, hyperglycemia, lactate 6.1, K 6.3, TSH 14  CTA shows < from: CT Angio Abdomen and Pelvis w/ IV Cont (05.12.20 @ 13:37) >  Emphysematous cystitis. Air is also present in the uterine wall consistent with endometritis or myometritis. Recommend surgical/urologic and OB/GYN evaluation.  Hyperemic sigmoid mucosa may indicate colitis. Sigmoid diverticulosis without diverticulitis. (12 May 2020 14:50)      24 hr events: No acute events. No chest pain, dyspnea, nausea, emesis, or diarrhea. Still with bloody BM. Reports doctor told her she is going home even after I explained to her that I was the doctor and that she was not going home today.    ## ROS:  See above ROS otherwise negative    ## Vitals  ICU Vital Signs Last 24 Hrs  T(C): 36.3 (13 May 2020 04:04), Max: 37.3 (12 May 2020 10:53)  T(F): 97.3 (13 May 2020 04:04), Max: 99.1 (12 May 2020 10:53)  HR: 87 (13 May 2020 07:00) (75 - 101)  BP: 119/97 (13 May 2020 07:00) (76/56 - 119/97)  BP(mean): 101 (13 May 2020 07:00) (61 - 101)  ABP: --  ABP(mean): --  RR: 10 (13 May 2020 07:00) (9 - 20)  SpO2: 100% (13 May 2020 07:00) (87% - 100%)      ## Physical Exam:  Gen: Morbidly obese F lying comfortably in bed, NAD  HEENT: NC/AT sclerae anicteric  Resp: No increased WOB, CTAB  CV: S1, S2   Abd: Soft, + BS  Ext: WWP  Neuro: Awake, alert, moves all extremities, confused    ## Vent Data      ## Labs:  Chem:  05-13    140  |  107  |  56<H>  ----------------------------<  263<H>  6.4<HH>   |  23  |  1.81<H>    Ca    7.9<L>      13 May 2020 04:09  Phos  5.6     05-13  Mg     2.6     05-13    TPro  5.2<L>  /  Alb  2.2<L>  /  TBili  0.3  /  DBili  x   /  AST  23  /  ALT  42  /  AlkPhos  65  05-13    LIVER FUNCTIONS - ( 13 May 2020 04:09 )  Alb: 2.2 g/dL / Pro: 5.2 gm/dL / ALK PHOS: 65 U/L / ALT: 42 U/L / AST: 23 U/L / GGT: x           CBC:                        9.6    13.78 )-----------( 206      ( 13 May 2020 04:09 )             30.5     Coags:  PT/INR - ( 12 May 2020 12:00 )   PT: 12.1 sec;   INR: 1.08 ratio         PTT - ( 12 May 2020 12:00 )  PTT:23.0 sec        ## Cardiac  CARDIAC MARKERS ( 12 May 2020 12:00 )  <.015 ng/mL / x     / x     / x     / x            ## Blood Gas      #I/Os  I&O's Detail    12 May 2020 07:01  -  13 May 2020 07:00  --------------------------------------------------------  IN:    IV PiggyBack: 400 mL    Lactated Ringers IV Bolus: 1000 mL    sodium bicarbonate  Infusion: 1125 mL  Total IN: 2525 mL    OUT:    Indwelling Catheter - Urethral: 100 mL  Total OUT: 100 mL    Total NET: 2425 mL          ## Imaging:    ## Medications:  MEDICATIONS  (STANDING):  chlorhexidine 4% Liquid 1 Application(s) Topical <User Schedule>  dextrose 5%. 1000 milliLiter(s) (50 mL/Hr) IV Continuous <Continuous>  dextrose 50% Injectable 12.5 Gram(s) IV Push once  insulin glargine Injectable (LANTUS) 35 Unit(s) SubCutaneous every morning  insulin lispro (HumaLOG) corrective regimen sliding scale   SubCutaneous every 4 hours  lactated ringers Bolus 500 milliLiter(s) IV Bolus once  levothyroxine 200 MICROGram(s) Oral daily  OLANZapine 2.5 milliGRAM(s) Oral two times a day  pantoprazole  Injectable 40 milliGRAM(s) IV Push two times a day  piperacillin/tazobactam IVPB.. 3.375 Gram(s) IV Intermittent every 8 hours  sodium zirconium cyclosilicate 10 Gram(s) Oral every 8 hours    MEDICATIONS  (PRN):  dextrose 40% Gel 15 Gram(s) Oral once PRN Blood Glucose LESS THAN 70 milliGRAM(s)/deciliter  glucagon  Injectable 1 milliGRAM(s) IntraMuscular once PRN Glucose LESS THAN 70 milligrams/deciliter

## 2020-05-13 NOTE — PROGRESS NOTE BEHAVIORAL HEALTH - NSBHCHARTREVIEWVS_PSY_A_CORE FT
T(C): 36.6 (05-13-20 @ 12:00), Max: 37.1 (05-12-20 @ 23:40)  HR: 85 (05-13-20 @ 13:00) (75 - 96)  BP: 94/70 (05-13-20 @ 13:00) (76/56 - 131/90)  RR: 9 (05-13-20 @ 13:00) (8 - 20)  SpO2: 100% (05-13-20 @ 13:00) (85% - 100%)

## 2020-05-13 NOTE — PROGRESS NOTE ADULT - ASSESSMENT
65 y/o F w/RA on prednisone, COPD, CHRIS, prior SBO s/p ex-lap complicated by evisceration, and ongoing psychiatric issues presenting with acute blood loss anemia secondary to GI bleed, SMA, and hyperkalemia. Empysemaptous cystitis and endometritis on CT scan.    - Transfuse PRN for hgb < 7  - Continue zosyn  - Appreciate GI consult  - Medical management of hyperkalemia  - Appreciate psych consult  - Downgrade to medicine

## 2020-05-13 NOTE — PROGRESS NOTE BEHAVIORAL HEALTH - OTHER
intermittent - keeps eyes mostly closed during assessment low end of fair deferred at this time intermittent episodes of linearity which changes to episodes of confusion/impaired reasoning focus on her previous surgery unable to tolerate an MMSE at this time limited at this time

## 2020-05-13 NOTE — PROGRESS NOTE BEHAVIORAL HEALTH - NSBHCHARTREVIEWLAB_PSY_A_CORE FT
05-13    142  |  105  |  57<H>  ----------------------------<  106<H>  4.6   |  30  |  1.70<H>    Ca    8.0<L>      13 May 2020 11:22  Phos  6.0     05-13  Mg     2.4     05-13    TPro  5.2<L>  /  Alb  2.2<L>  /  TBili  0.3  /  DBili  x   /  AST  23  /  ALT  42  /  AlkPhos  65  05-13

## 2020-05-13 NOTE — CHART NOTE - NSCHARTNOTEFT_GEN_A_CORE
HPI:  64 year old female with a history of hypothyroidism, HTN, IDDM, COPD/CHRIS, RA (on Prednisone), SBO s/p ex lap with lysis of adhesions c/b evisceration,  recent hospitalization in 3/2020 with encephalopathy found to be significantly hypothyroid (, without myxedema coma), small (stable) right sided SDH, UTI, catatonic reaction to VPA (now resolved) and DKA (resolved) and ongoing psychiatric issues. Presents today with GI bleeding. She started having BRBPR about 4-5 hours ago, with numerous episodes of bloody stool since then. She denies any other symptoms - denies dyspnea/chest pain/light headedness/dizziness. Her son notes this involved several episodes of diarrhea w/ bloody stool after an enema earlier in the day.    In ED: Sinus tach, hypotensive and sPO2 87% on RA.  Labs remarkable for anemia, SAM, hyperglycemia, lactate 6.1, K 6.3, TSH 14  CTA shows < from: CT Angio Abdomen and Pelvis w/ IV Cont (05.12.20 @ 13:37) >  Emphysematous cystitis. Air is also present in the uterine wall consistent with endometritis or myometritis. Recommend surgical/urologic and OB/GYN evaluation.  Hyperemic sigmoid mucosa may indicate colitis. Sigmoid diverticulosis without diverticulitis. (12 May 2020 14:50)    Pt admitted to ICU, transfused 2 units PRBC, repeat H/H 11.3/ 36.8 post transfusion last night and 9.6/ 30.5 this am. No further rectal bleeding noted. Pt seen by GI, Dr. Duenas, for EGD/ colonoscopy when pt medically stable/ hyperkalemia resolved. K 6.4 this am, calcium gluconate, regular insulin, lokelma, albuterol given. Repeat K 4.6 at 1100 today. On zosyn IV for UTI. Pt seen by psych for verbal agression/ agitation. Pt does not have capacity to make medical decisions at this time. Seen by surgery, Dr. Matos, no surgical intervention at this time, for possible IR consultation for angio/embo vs repeat CTA if acute decompensation.  Pt seen and examined by Dr. Castro. For transfer to medical floor. Spoke with pt's son Norris. Sign out given to hospitalist on call, Dr. Martínez.

## 2020-05-13 NOTE — PROGRESS NOTE ADULT - SUBJECTIVE AND OBJECTIVE BOX
Patient was seen and examined in ICU with Dr Coppola.   Pt is grossly uncooperative with exam and repeats she "is fine" and "going home".  Pt reports h/o recent hernia surgery at [facility] "5 weeks ago". (See below.)  Denies abdominal pain. Denies BMs.  Receiving bicarb gtt.    T(F): 97.9 (05-13-20 @ 08:00), Max: 99.1 (05-12-20 @ 10:53)  HR: 83 (05-13-20 @ 08:00) (75 - 101)  BP: 102/76 (05-13-20 @ 08:00) (76/56 - 119/97)  RR: 10 (05-13-20 @ 08:00) (9 - 20)  SpO2: 100% (05-13-20 @ 08:00) (87% - 100%)  Wt(kg): --      POCT Blood Glucose.: 256 mg/dL (13 May 2020 05:58)  POCT Blood Glucose.: 316 mg/dL (13 May 2020 01:44)  POCT Blood Glucose.: 296 mg/dL (12 May 2020 23:27)  POCT Blood Glucose.: 304 mg/dL (12 May 2020 22:02)  POCT Blood Glucose.: 305 mg/dL (12 May 2020 18:51)  POCT Blood Glucose.: 330 mg/dL (12 May 2020 15:59)      PHYSICAL EXAM:  General: NAD, alert and responsive  HEENT: NCAT, conjunctiva clear  : iraheta intact with clear urine  Pt refused abdominal rectal exams      LABS:                        9.6    13.78 )-----------( 206      ( 13 May 2020 04:09 )             30.5     05-13    140  |  107  |  56<H>  ----------------------------<  263<H>  6.4<HH>   |  23  |  1.81<H>    Ca    7.9<L>      13 May 2020 04:09  Phos  5.6     05-13  Mg     2.6     05-13    TPro  5.2<L>  /  Alb  2.2<L>  /  TBili  0.3  /  DBili  x   /  AST  23  /  ALT  42  /  AlkPhos  65  05-13    PT/INR - ( 12 May 2020 12:00 )   PT: 12.1 sec;   INR: 1.08 ratio    PTT - ( 12 May 2020 12:00 )  PTT:23.0 sec    < from: CT Angio Abdomen and Pelvis w/ IV Cont (05.12.20 @ 13:37) >  Emphysematous cystitis. Air is also present in the uterine wall consistent with endometritis or myometritis. Recommend surgical/urologic and OB/GYN evaluation.    No active intraluminal extravasation of contrast identified to indicate active GI bleed. Hyperemic sigmoid mucosa may indicate colitis. Sigmoid diverticulosis without diverticulitis.      < end of copied text >      A/P: 65yo obese female PMH RA on prednisone, HTN, DM, COPD, CHRIS, with h/o ex-lap complicated by evisceration December 2018 at [facility]  presently seen with ongoing psychiatric issues, acute blood loss anemia secondary to GI bleed, Emphysematous cystitis, endometritis, Sigmoid colitis and diverticulosis seen on CT scan.  Extensive chart review reveals hospitalization at [facility] in 12/2018 at which time patient underwent exploratory laparotomy which was complicated by evisceration with RTOR  Also noted in chart is h/o large diastases of the rectus abdominis muscle with herniation of small and large bowel into a large ventral incisional hernia   There is no documentation of recent surgery "5 weeks ago" as patient suggests. Present Psych input noted; acute delirium, no capacity. However, pt does not allow full examination.  No acute surgical intervention is warranted at this time.  Recommend transfuse as warranted, Q6 hour CBCs until stable.  GI Follow up for colonoscopy once stable  All discussed with Dr Coppola  Continue ICU management

## 2020-05-13 NOTE — PROGRESS NOTE ADULT - SUBJECTIVE AND OBJECTIVE BOX
Patient is a 64y old  Female who presents with a chief complaint of GI bleed (13 May 2020 09:49)      HPI:  64 year old female with a history of hypothyroidism, HTN, IDDM, COPD/CHRIS, RA (on Prednisone), SBO s/p ex lap with lysis of adhesions c/b evisceration,  recent hospitalization in 3/2020 with encephalopathy found to be significantly hypothyroid (, without myxedema coma), small (stable) right sided SDH, UTI, catatonic reaction to VPA (now resolved) and DKA (resolved) and ongoing psychiatric issues. Presents today with GI bleeding. She started having BRBPR about 4-5 hours ago, with numerous episodes of bloody stool since then. She denies any other symptoms - denies dyspnea/chest pain/light headedness/dizziness. Her son notes this involved several episodes of diarrhea w/ bloody stool after an enema earlier in the day.    In ED: Sinus tach, hypotensive and sPO2 87% on RA.  Labs remarkable for anemia, SAM, hyperglycemia, lactate 6.1, K 6.3, TSH 14  CTA shows < from: CT Angio Abdomen and Pelvis w/ IV Cont (20 @ 13:37) >  Emphysematous cystitis. Air is also present in the uterine wall consistent with endometritis or myometritis. Recommend surgical/urologic and OB/GYN evaluation.  Hyperemic sigmoid mucosa may indicate colitis. Sigmoid diverticulosis without diverticulitis. (12 May 2020 14:50)      INTERVAL HPI/OVERNIGHT EVENTS:  Patient denies having any problems. Denies all GI symptoms.  Wants to go home. Nurse states patient had a small bloody BM early lat night. None since.     MEDICATIONS  (STANDING):  chlorhexidine 4% Liquid 1 Application(s) Topical <User Schedule>  dextrose 5%. 1000 milliLiter(s) (50 mL/Hr) IV Continuous <Continuous>  dextrose 50% Injectable 12.5 Gram(s) IV Push once  insulin glargine Injectable (LANTUS) 35 Unit(s) SubCutaneous every morning  insulin lispro (HumaLOG) corrective regimen sliding scale   SubCutaneous every 4 hours  lactated ringers Bolus 500 milliLiter(s) IV Bolus once  levothyroxine 200 MICROGram(s) Oral daily  OLANZapine 2.5 milliGRAM(s) Oral two times a day  pantoprazole  Injectable 40 milliGRAM(s) IV Push two times a day  piperacillin/tazobactam IVPB.. 3.375 Gram(s) IV Intermittent every 8 hours  sodium zirconium cyclosilicate 10 Gram(s) Oral every 8 hours    MEDICATIONS  (PRN):  dextrose 40% Gel 15 Gram(s) Oral once PRN Blood Glucose LESS THAN 70 milliGRAM(s)/deciliter  glucagon  Injectable 1 milliGRAM(s) IntraMuscular once PRN Glucose LESS THAN 70 milligrams/deciliter      FAMILY HISTORY:  No pertinent family history in first degree relatives      Allergies    Depakote (Other)  Seroquel (Other (Severe))    Intolerances        PMH/PSH:  Diverticulosis  Cellulitis  CVA (Cerebral Vascular Accident)  RA (Rheumatoid Arthritis)  Tooth Abscess  Arthritis  Cigarette Smoker  Chronic Asthma  Adult Hypothyroidism  Borderline Diabetes Mellitus  High Cholesterol  H/O: HTN  Wrist Disorder  History of  Section        REVIEW OF SYSTEMS:  CONSTITUTIONAL: No fever, weight loss, or fatigue  EYES: No eye pain, visual disturbances, or discharge  ENMT:  No difficulty hearing, tinnitus, vertigo; No sinus or throat pain  NECK: No pain or stiffness  BREASTS: No pain, masses, or nipple discharge  RESPIRATORY: No cough, wheezing, chills or hemoptysis; No shortness of breath  CARDIOVASCULAR: No chest pain, palpitations, dizziness, or leg swelling  GASTROINTESTINAL: See above  GENITOURINARY: No dysuria, frequency, hematuria, or incontinence  NEUROLOGICAL: No headaches, memory loss, loss of strength, numbness, or tremors  SKIN: No itching, burning, rashes, or lesions   LYMPH NODES: No enlarged glands  ENDOCRINE: No heat or cold intolerance; No hair loss  MUSCULOSKELETAL: No joint pain or swelling; No muscle, back, or extremity pain  PSYCHIATRIC: No depression, anxiety, mood swings, or difficulty sleeping  HEME/LYMPH: No easy bruising, or bleeding gums  ALLERGY AND IMMUNOLOGIC: No hives or eczema    Vital Signs Last 24 Hrs  T(C): 36.6 (13 May 2020 08:00), Max: 37.1 (12 May 2020 23:40)  T(F): 97.9 (13 May 2020 08:00), Max: 98.7 (12 May 2020 23:40)  HR: 85 (13 May 2020 10:00) (75 - 96)  BP: 114/81 (13 May 2020 10:00) (76/56 - 131/90)  BP(mean): 90 (13 May 2020 10:00) (61 - 101)  RR: 10 (13 May 2020 10:00) (9 - 20)  SpO2: 98% (13 May 2020 09:00) (98% - 100%)    PHYSICAL EXAM: Refused exam   GENERAL: NAD, well-groomed, well-developed  HEAD:  Atraumatic, Normocephalic  EYES: EOMI, PERRLA, conjunctiva and sclera clear  NECK: Supple, No JVD, Normal thyroid  NERVOUS SYSTEM:  Alert & Oriented  Good concentration;   CHEST/LUNG:  Refused exam  HEART:   Refused exam  ABDOMEN: Refused exam  EXTREMITIES:  2+ Peripheral Pulses, No clubbing, cyanosis, or edema      LAB                          9.6    13.78 )-----------( 206      ( 13 May 2020 04:09 )             30.5       CBC:   @ 04:09  WBC 13.78   Hgb 9.6   Hct 30.5   Plts 206  MCV 86.2   @ 23:31  WBC 15.42   Hgb 11.3   Hct 36.8   Plts 251  MCV 88.7   @ 12:00  WBC 15.42   Hgb 6.7   Hct 22.6   Plts 318  .1      Chemistry:   @ 04:09  Na+ 140  K+ 6.4  Cl- 107  CO2 23  BUN 56  Cr 1.81      @ 23:31  Na+ 138  K+ 6.7  Cl- 108  CO2 20  BUN 54  Cr 1.64      @ 12:00  Na+ 145  K+ 6.3  Cl- 110  CO2 23  BUN 46  Cr 1.39         Glucose, Serum: 263 mg/dL ( @ 04:09)  Glucose, Serum: 302 mg/dL ( @ 23:31)  Glucose, Serum: 482 mg/dL ( @ 12:00)      13 May 2020 04:09    140    |  107    |  56     ----------------------------<  263    6.4     |  23     |  1.81   12 May 2020 23:31    138    |  108    |  54     ----------------------------<  302    6.7     |  20     |  1.64   12 May 2020 12:00    145    |  110    |  46     ----------------------------<  482    6.3     |  23     |  1.39     Ca    7.9        13 May 2020 04:09  Ca    7.8        12 May 2020 23:31  Ca    7.5        12 May 2020 12:00  Phos  5.6       13 May 2020 04:09  Phos  6.0       12 May 2020 23:31  Mg     2.6       13 May 2020 04:09  Mg     2.7       12 May 2020 23:31    TPro  5.2    /  Alb  2.2    /  TBili  0.3    /  DBili  x      /  AST  23     /  ALT  42     /  AlkPhos  65     13 May 2020 04:09  TPro  5.5    /  Alb  2.2    /  TBili  0.2    /  DBili  x      /  AST  26     /  ALT  38     /  AlkPhos  76     12 May 2020 12:00      PT/INR - ( 12 May 2020 12:00 )   PT: 12.1 sec;   INR: 1.08 ratio         PTT - ( 12 May 2020 12:00 )  PTT:23.0 sec    Urinalysis Basic - ( 13 May 2020 09:00 )    Color: Yellow / Appearance: Slightly Turbid / S.010 / pH: x  Gluc: x / Ketone: Negative  / Bili: Negative / Urobili: Negative mg/dL   Blood: x / Protein: 500 mg/dL / Nitrite: Negative   Leuk Esterase: Moderate / RBC: 3-5 /HPF / WBC >50   Sq Epi: x / Non Sq Epi: Moderate / Bacteria: x        CAPILLARY BLOOD GLUCOSE      POCT Blood Glucose.: 171 mg/dL (13 May 2020 09:55)  POCT Blood Glucose.: 256 mg/dL (13 May 2020 05:58)  POCT Blood Glucose.: 316 mg/dL (13 May 2020 01:44)  POCT Blood Glucose.: 296 mg/dL (12 May 2020 23:27)  POCT Blood Glucose.: 304 mg/dL (12 May 2020 22:02)  POCT Blood Glucose.: 305 mg/dL (12 May 2020 18:51)  POCT Blood Glucose.: 330 mg/dL (12 May 2020 15:59)          RADIOLOGY & ADDITIONAL TESTS:    Imaging Personally Reviewed:  [ ] YES  [ ] NO    Consultant(s) Notes Reviewed:  [ ] YES  [ ] NO    Care Discussed with Consultants/Other Providers [ ] YES  [ ] NO

## 2020-05-14 LAB
A1C WITH ESTIMATED AVERAGE GLUCOSE RESULT: 6.4 % — HIGH (ref 4–5.6)
ANION GAP SERPL CALC-SCNC: 7 MMOL/L — SIGNIFICANT CHANGE UP (ref 5–17)
BUN SERPL-MCNC: 39 MG/DL — HIGH (ref 7–23)
CALCIUM SERPL-MCNC: 8.2 MG/DL — LOW (ref 8.5–10.1)
CHLORIDE SERPL-SCNC: 107 MMOL/L — SIGNIFICANT CHANGE UP (ref 96–108)
CO2 SERPL-SCNC: 28 MMOL/L — SIGNIFICANT CHANGE UP (ref 22–31)
CREAT SERPL-MCNC: 1.28 MG/DL — SIGNIFICANT CHANGE UP (ref 0.5–1.3)
CULTURE RESULTS: SIGNIFICANT CHANGE UP
ESTIMATED AVERAGE GLUCOSE: 137 MG/DL — HIGH (ref 68–114)
GLUCOSE BLDC GLUCOMTR-MCNC: 99 MG/DL — SIGNIFICANT CHANGE UP (ref 70–99)
GLUCOSE SERPL-MCNC: 170 MG/DL — HIGH (ref 70–99)
HCT VFR BLD CALC: 32.1 % — LOW (ref 34.5–45)
HGB BLD-MCNC: 9.7 G/DL — LOW (ref 11.5–15.5)
MAGNESIUM SERPL-MCNC: 2.4 MG/DL — SIGNIFICANT CHANGE UP (ref 1.6–2.6)
MCHC RBC-ENTMCNC: 27.7 PG — SIGNIFICANT CHANGE UP (ref 27–34)
MCHC RBC-ENTMCNC: 30.2 GM/DL — LOW (ref 32–36)
MCV RBC AUTO: 91.7 FL — SIGNIFICANT CHANGE UP (ref 80–100)
NRBC # BLD: 0 /100 WBCS — SIGNIFICANT CHANGE UP (ref 0–0)
OB PNL STL: POSITIVE
PHOSPHATE SERPL-MCNC: 4.4 MG/DL — SIGNIFICANT CHANGE UP (ref 2.5–4.5)
PLATELET # BLD AUTO: 220 K/UL — SIGNIFICANT CHANGE UP (ref 150–400)
POTASSIUM SERPL-MCNC: 3.5 MMOL/L — SIGNIFICANT CHANGE UP (ref 3.5–5.3)
POTASSIUM SERPL-SCNC: 3.5 MMOL/L — SIGNIFICANT CHANGE UP (ref 3.5–5.3)
RBC # BLD: 3.5 M/UL — LOW (ref 3.8–5.2)
RBC # FLD: 26 % — HIGH (ref 10.3–14.5)
SARS-COV-2 RNA SPEC QL NAA+PROBE: SIGNIFICANT CHANGE UP
SODIUM SERPL-SCNC: 142 MMOL/L — SIGNIFICANT CHANGE UP (ref 135–145)
SPECIMEN SOURCE: SIGNIFICANT CHANGE UP
WBC # BLD: 13.59 K/UL — HIGH (ref 3.8–10.5)
WBC # FLD AUTO: 13.59 K/UL — HIGH (ref 3.8–10.5)

## 2020-05-14 PROCEDURE — 99232 SBSQ HOSP IP/OBS MODERATE 35: CPT

## 2020-05-14 PROCEDURE — 99233 SBSQ HOSP IP/OBS HIGH 50: CPT

## 2020-05-14 RX ORDER — PANTOPRAZOLE SODIUM 20 MG/1
40 TABLET, DELAYED RELEASE ORAL
Refills: 0 | Status: DISCONTINUED | OUTPATIENT
Start: 2020-05-14 | End: 2020-05-15

## 2020-05-14 RX ADMIN — SODIUM ZIRCONIUM CYCLOSILICATE 10 GRAM(S): 10 POWDER, FOR SUSPENSION ORAL at 05:20

## 2020-05-14 RX ADMIN — Medication 1: at 17:07

## 2020-05-14 RX ADMIN — Medication 175 MICROGRAM(S): at 05:22

## 2020-05-14 RX ADMIN — INSULIN GLARGINE 35 UNIT(S): 100 INJECTION, SOLUTION SUBCUTANEOUS at 08:17

## 2020-05-14 RX ADMIN — PIPERACILLIN AND TAZOBACTAM 25 GRAM(S): 4; .5 INJECTION, POWDER, LYOPHILIZED, FOR SOLUTION INTRAVENOUS at 05:20

## 2020-05-14 RX ADMIN — SODIUM ZIRCONIUM CYCLOSILICATE 10 GRAM(S): 10 POWDER, FOR SUSPENSION ORAL at 21:42

## 2020-05-14 RX ADMIN — PIPERACILLIN AND TAZOBACTAM 25 GRAM(S): 4; .5 INJECTION, POWDER, LYOPHILIZED, FOR SOLUTION INTRAVENOUS at 14:58

## 2020-05-14 RX ADMIN — Medication 1: at 11:54

## 2020-05-14 RX ADMIN — PIPERACILLIN AND TAZOBACTAM 25 GRAM(S): 4; .5 INJECTION, POWDER, LYOPHILIZED, FOR SOLUTION INTRAVENOUS at 21:41

## 2020-05-14 RX ADMIN — PANTOPRAZOLE SODIUM 40 MILLIGRAM(S): 20 TABLET, DELAYED RELEASE ORAL at 05:21

## 2020-05-14 NOTE — PROGRESS NOTE ADULT - SUBJECTIVE AND OBJECTIVE BOX
Pt seen and examined at bedside resting comfortably, no acute issues overnight, denies any bloody BM.    T(F): 98.9 (05-14-20 @ 11:11), Max: 98.9 (05-14-20 @ 11:11)  HR: 105 (05-14-20 @ 11:11) (87 - 109)  BP: 141/71 (05-14-20 @ 11:11) (91/57 - 156/81)  RR: 17 (05-14-20 @ 11:11) (9 - 18)  SpO2: 100% (05-14-20 @ 11:11) (85% - 100%)  Wt(kg): --  CAPILLARY BLOOD GLUCOSE      POCT Blood Glucose.: 176 mg/dL (14 May 2020 11:31)  POCT Blood Glucose.: 99 mg/dL (14 May 2020 08:02)  POCT Blood Glucose.: 112 mg/dL (13 May 2020 22:31)  POCT Blood Glucose.: 64 mg/dL (13 May 2020 21:57)  POCT Blood Glucose.: 79 mg/dL (13 May 2020 17:30)  POCT Blood Glucose.: 85 mg/dL (13 May 2020 13:51)      PHYSICAL EXAM:  General: NAD, alert and responsive  HEENT: NCAT, conjunctiva clear  : iraheta intact with clear urine  Pt refused abdominal rectal exams    LABS:                        9.7    13.59 )-----------( 220      ( 14 May 2020 10:37 )             32.1     05-14    142  |  107  |  39<H>  ----------------------------<  170<H>  3.5   |  28  |  1.28    Ca    8.2<L>      14 May 2020 10:37  Phos  4.4     05-14  Mg     2.4     05-14    TPro  5.2<L>  /  Alb  2.2<L>  /  TBili  0.3  /  DBili  x   /  AST  23  /  ALT  42  /  AlkPhos  65  05-13      I&O's Detail    13 May 2020 07:01  -  14 May 2020 07:00  --------------------------------------------------------  IN:    Solution: 200 mL  Total IN: 200 mL    OUT:    Indwelling Catheter - Urethral: 1135 mL  Total OUT: 1135 mL    Total NET: -935 mL        Culture Results:   <10,000 CFU/mL Normal Urogenital Perla (05-13 @ 11:54)  Culture Results:   No growth to date. (05-13 @ 08:47)  Culture Results:   No growth to date. (05-13 @ 08:47)      A/P: 65yo obese female PMH RA on prednisone, HTN, DM, COPD, CHRIS, with h/o ex-lap complicated by evisceration December 2018 at [facility]  presently seen with ongoing psychiatric issues, acute blood loss anemia secondary to GI bleed, Emphysematous cystitis, endometritis, Sigmoid colitis and diverticulosis seen on CT scan.  Extensive chart review reveals hospitalization at [facility] in 12/2018 at which time patient underwent exploratory laparotomy which was complicated by evisceration with RTOR  Also noted in chart is h/o large diastases of the rectus abdominis muscle with herniation of small and large bowel into a large ventral incisional hernia   There is no documentation of recent surgery "5 weeks ago" as patient suggests. Present Psych input noted; acute delirium, no capacity. However, pt does not allow full examination.  No acute surgical intervention is warranted at this time.  h/h stable, pt for outpt GI workup  cont care per medical and GI teams  reconsult PRN

## 2020-05-14 NOTE — PROGRESS NOTE ADULT - ASSESSMENT
HPI:  64 year old female with a history of hypothyroidism, HTN, IDDM, COPD/CHRIS, RA (on Prednisone), SBO s/p ex lap with lysis of adhesions c/b evisceration,  recent hospitalization in 3/2020 with encephalopathy found to be significantly hypothyroid (, without myxedema coma), small (stable) right sided SDH, UTI, catatonic reaction to VPA (now resolved) and DKA (resolved) and ongoing psychiatric issues. Presents today with GI bleeding. She started having BRBPR about 4-5 hours ago, with numerous episodes of bloody stool since then. She denies any other symptoms - denies dyspnea/chest pain/light headedness/dizziness. Her son notes this involved several episodes of diarrhea w/ bloody stool after an enema earlier in the day.      In ED: Sinus tach, hypotensive and sPO2 87% on RA.  Labs remarkable for anemia, SAM, hyperglycemia, lactate 6.1, K 6.3, TSH 14  CTA shows < from: CT Angio Abdomen and Pelvis w/ IV Cont (05.12.20 @ 13:37) >  Emphysematous cystitis. Air is also present in the uterine wall consistent with endometritis or myometritis. Recommend surgical/urologic and OB/GYN evaluation.  Hyperemic sigmoid mucosa may indicate colitis. Sigmoid diverticulosis without diverticulitis. (12 May 2020 14:50)    -------------- As Above ------- Patient seen earlier today  The patient is a poor historian. Presents with multiple episodes of bloody BM.  In the ER, patient noted ti be temporarily hypotensive. Painless. Denies anticoagulants or NSAIDs.   Last colonoscopy (?). In ER , patient noted to have elevated potassium level.   No further bloody BMs since last seen.   History of SBO repair    GI Bleed, probably lower, probably diverticular  S/P 2 units PRBC 6.7 -- > 9.6 --> 9.7  1) full  liquid diet and advance as tolerated 2) f/u labs 3) EGD / colonoscopy as out patient. No signs of active bleeding. Covid policy only allows emergent procedures inpatient

## 2020-05-14 NOTE — PHYSICAL THERAPY INITIAL EVALUATION ADULT - PERTINENT HX OF CURRENT PROBLEM, REHAB EVAL
Pt 64 year old female with a history of hypothyroidism, HTN, IDDM, COPD/CHRIS, RA (on Prednisone), recent hospitalization in 3/2020 with encephalopathy. Pt admitted for GI bleeding.

## 2020-05-14 NOTE — OCCUPATIONAL THERAPY INITIAL EVALUATION ADULT - PLANNED THERAPY INTERVENTIONS, OT EVAL
ROM/neuromuscular re-education/stretching/balance training/bed mobility training/fine motor coordination training/strengthening

## 2020-05-14 NOTE — OCCUPATIONAL THERAPY INITIAL EVALUATION ADULT - BALANCE TRAINING, PT EVAL
Pt will attain sitting balance 1 grade to sit upright in her w/c and interact with her surrounding with 4 weeks

## 2020-05-14 NOTE — PROGRESS NOTE BEHAVIORAL HEALTH - NSBHCONSULTMEDPRN_PSY_A_CORE
yes
body aches / sore throat
yes

## 2020-05-14 NOTE — PROGRESS NOTE ADULT - SUBJECTIVE AND OBJECTIVE BOX
HPI:  64 year old female with a history of hypothyroidism, HTN, IDDM, COPD/CHRIS, RA (on Prednisone), SBO s/p ex lap with lysis of adhesions c/b evisceration,  recent hospitalization in 3/2020 with encephalopathy found to be significantly hypothyroid (, without myxedema coma), small (stable) right sided SDH, UTI, catatonic reaction to VPA (now resolved) and DKA (resolved) and ongoing psychiatric issues. Presents today with GI bleeding. She started having BRBPR about 4-5 hours ago, with numerous episodes of bloody stool since then. She denies any other symptoms - denies dyspnea/chest pain/light headedness/dizziness. Her son notes this involved several episodes of diarrhea w/ bloody stool after an enema earlier in the day.    In ED: Sinus tach, hypotensive and sPO2 87% on RA.  Labs remarkable for anemia, SAM, hyperglycemia, lactate 6.1, K 6.3, TSH 14  CTA shows < from: CT Angio Abdomen and Pelvis w/ IV Cont (20 @ 13:37) >  Emphysematous cystitis. Air is also present in the uterine wall consistent with endometritis or myometritis. Recommend surgical/urologic and OB/GYN evaluation.  Hyperemic sigmoid mucosa may indicate colitis. Sigmoid diverticulosis without diverticulitis. (12 May 2020 14:50)      SUBJECTIVE & OBJECTIVE: Pt seen and examined at bedside.   no overnight events.   no complaints.     PHYSICAL EXAM:  T(C): 37.2 (20 @ 17:19), Max: 37.2 (20 @ 11:11)  HR: 103 (20 @ 17:19) (88 - 109)  BP: 113/66 (20 @ 17:19) (113/66 - 156/81)  RR: 16 (20 @ 17:19) (16 - 18)  SpO2: 100% (20 @ 17:19) (85% - 100%)  Wt(kg): --   I&O's Detail    13 May 2020 07:01  -  14 May 2020 07:00  --------------------------------------------------------  IN:    Solution: 200 mL  Total IN: 200 mL    OUT:    Indwelling Catheter - Urethral: 1135 mL  Total OUT: 1135 mL    Total NET: -935 mL        GENERAL: NAD, well-groomed, well-developed  HEAD:  Atraumatic, Normocephalic  EYES: EOMI, PERRLA, conjunctiva and sclera clear  ENMT: Moist mucous membranes  NECK: Supple, No JVD  NERVOUS SYSTEM:  Alert & Oriented X3, Motor Strength 5/5 B/L upper and lower extremities; DTRs 2+ intact and symmetric  CHEST/LUNG: Clear to auscultation bilaterally; No rales, rhonchi, wheezing, or rubs  HEART: Regular rate and rhythm; No murmurs, rubs, or gallops  ABDOMEN: Soft, Nontender, Nondistended; Bowel sounds present  EXTREMITIES:  2+ Peripheral Pulses, No clubbing, cyanosis, or edema    MEDICATIONS  (STANDING):  dextrose 5%. 1000 milliLiter(s) (50 mL/Hr) IV Continuous <Continuous>  dextrose 50% Injectable 12.5 Gram(s) IV Push once  dextrose 50% Injectable 25 Gram(s) IV Push once  dextrose 50% Injectable 25 Gram(s) IV Push once  insulin glargine Injectable (LANTUS) 35 Unit(s) SubCutaneous every morning  insulin lispro (HumaLOG) corrective regimen sliding scale   SubCutaneous three times a day before meals  insulin lispro (HumaLOG) corrective regimen sliding scale   SubCutaneous at bedtime  lactated ringers Bolus 500 milliLiter(s) IV Bolus once  levothyroxine 200 MICROGram(s) Oral daily  pantoprazole    Tablet 40 milliGRAM(s) Oral before breakfast  piperacillin/tazobactam IVPB.. 3.375 Gram(s) IV Intermittent every 8 hours    MEDICATIONS  (PRN):  dextrose 40% Gel 15 Gram(s) Oral once PRN Blood Glucose LESS THAN 70 milliGRAM(s)/deciliter  glucagon  Injectable 1 milliGRAM(s) IntraMuscular once PRN Glucose LESS THAN 70 milligrams/deciliter  OLANZapine 2.5 milliGRAM(s) Oral two times a day PRN agitation due to delirium      LABS:                        9.7    13.59 )-----------( 220      ( 14 May 2020 10:37 )             32.1     05-14    142  |  107  |  39<H>  ----------------------------<  170<H>  3.5   |  28  |  1.28    Ca    8.2<L>      14 May 2020 10:37  Phos  4.4       Mg     2.4         TPro  5.2<L>  /  Alb  2.2<L>  /  TBili  0.3  /  DBili  x   /  AST  23  /  ALT  42  /  AlkPhos  65        Urinalysis Basic - ( 13 May 2020 09:00 )    Color: Yellow / Appearance: Slightly Turbid / S.010 / pH: x  Gluc: x / Ketone: Negative  / Bili: Negative / Urobili: Negative mg/dL   Blood: x / Protein: 500 mg/dL / Nitrite: Negative   Leuk Esterase: Moderate / RBC: 3-5 /HPF / WBC >50   Sq Epi: x / Non Sq Epi: Moderate / Bacteria: x      Magnesium, Serum: 2.4 mg/dL ( @ 10:37)    CAPILLARY BLOOD GLUCOSE      POCT Blood Glucose.: 129 mg/dL (14 May 2020 21:37)  POCT Blood Glucose.: 164 mg/dL (14 May 2020 16:16)  POCT Blood Glucose.: 176 mg/dL (14 May 2020 11:31)  POCT Blood Glucose.: 99 mg/dL (14 May 2020 08:02)  POCT Blood Glucose.: 112 mg/dL (13 May 2020 22:31)  POCT Blood Glucose.: 64 mg/dL (13 May 2020 21:57)            RECENT CULTURES:  Urine culture:   @ 11:54 --   <10,000 CFU/mL Normal Urogenital Perla  Urine culture:   @ 08:47 --   No growth to date.      RADIOLOGY & ADDITIONAL TESTS:  Imaging Personally Reviewed:  [ ] YES  [ ] NO    Consultant(s) Notes Reviewed:  [ x] YES  [ ] NO    Care Discussed with Consultants/Other Providers [x ] YES  [ ] NO    Care discussed in detail with patient.  All questions and concerns addressed

## 2020-05-14 NOTE — OCCUPATIONAL THERAPY INITIAL EVALUATION ADULT - SOCIAL CONCERNS
Complex psychosocial needs/coping issues/Pt voiced concerns about lack of ability to move RLE due to pain from an old nerve injury.

## 2020-05-14 NOTE — OCCUPATIONAL THERAPY INITIAL EVALUATION ADULT - STRENGTHENING, PT EVAL
Pt will increased BUE/LE muscle strength by 1 full grade to assist with bed mobility and self care tasks within 30days

## 2020-05-14 NOTE — PROGRESS NOTE ADULT - SUBJECTIVE AND OBJECTIVE BOX
Patient is a 64y old  Female who presents with a chief complaint of GI bleed (13 May 2020 11:09)      HPI:  64 year old female with a history of hypothyroidism, HTN, IDDM, COPD/CHRIS, RA (on Prednisone), SBO s/p ex lap with lysis of adhesions c/b evisceration,  recent hospitalization in 3/2020 with encephalopathy found to be significantly hypothyroid (, without myxedema coma), small (stable) right sided SDH, UTI, catatonic reaction to VPA (now resolved) and DKA (resolved) and ongoing psychiatric issues. Presents today with GI bleeding. She started having BRBPR about 4-5 hours ago, with numerous episodes of bloody stool since then. She denies any other symptoms - denies dyspnea/chest pain/light headedness/dizziness. Her son notes this involved several episodes of diarrhea w/ bloody stool after an enema earlier in the day.    In ED: Sinus tach, hypotensive and sPO2 87% on RA.  Labs remarkable for anemia, SAM, hyperglycemia, lactate 6.1, K 6.3, TSH 14  CTA shows < from: CT Angio Abdomen and Pelvis w/ IV Cont (20 @ 13:37) >  Emphysematous cystitis. Air is also present in the uterine wall consistent with endometritis or myometritis. Recommend surgical/urologic and OB/GYN evaluation.  Hyperemic sigmoid mucosa may indicate colitis. Sigmoid diverticulosis without diverticulitis. (12 May 2020 14:50)      INTERVAL HPI/OVERNIGHT EVENTS:  No MHH as per nurse. Tolerating clear liquids. The patient denies melena, hematochezia, hematemesis, nausea, vomiting, abdominal pain, constipation, diarrhea, or change in bowel movements     MEDICATIONS  (STANDING):  dextrose 5%. 1000 milliLiter(s) (50 mL/Hr) IV Continuous <Continuous>  dextrose 50% Injectable 12.5 Gram(s) IV Push once  dextrose 50% Injectable 25 Gram(s) IV Push once  dextrose 50% Injectable 25 Gram(s) IV Push once  insulin glargine Injectable (LANTUS) 35 Unit(s) SubCutaneous every morning  insulin lispro (HumaLOG) corrective regimen sliding scale   SubCutaneous three times a day before meals  insulin lispro (HumaLOG) corrective regimen sliding scale   SubCutaneous at bedtime  lactated ringers Bolus 500 milliLiter(s) IV Bolus once  levothyroxine 200 MICROGram(s) Oral daily  pantoprazole  Injectable 40 milliGRAM(s) IV Push two times a day  piperacillin/tazobactam IVPB.. 3.375 Gram(s) IV Intermittent every 8 hours  sodium zirconium cyclosilicate 10 Gram(s) Oral every 8 hours    MEDICATIONS  (PRN):  dextrose 40% Gel 15 Gram(s) Oral once PRN Blood Glucose LESS THAN 70 milliGRAM(s)/deciliter  glucagon  Injectable 1 milliGRAM(s) IntraMuscular once PRN Glucose LESS THAN 70 milligrams/deciliter  OLANZapine 2.5 milliGRAM(s) Oral two times a day PRN agitation due to delirium      FAMILY HISTORY:  No pertinent family history in first degree relatives      Allergies    Depakote (Other)  Seroquel (Other (Severe))    Intolerances        PMH/PSH:  Diverticulosis  Cellulitis  CVA (Cerebral Vascular Accident)  RA (Rheumatoid Arthritis)  Tooth Abscess  Arthritis  Cigarette Smoker  Chronic Asthma  Adult Hypothyroidism  Borderline Diabetes Mellitus  High Cholesterol  H/O: HTN  Wrist Disorder  History of  Section        REVIEW OF SYSTEMS:  CONSTITUTIONAL: No fever, weight loss,   EYES: No eye pain, visual disturbances, or discharge  ENMT:  No difficulty hearing, tinnitus, vertigo; No sinus or throat pain  NECK: No pain or stiffness  BREASTS: No pain, masses, or nipple discharge  RESPIRATORY: No cough, wheezing, chills or hemoptysis; No shortness of breath  CARDIOVASCULAR: No chest pain, palpitations, dizziness, or leg swelling  GASTROINTESTINAL: See above  GENITOURINARY: No dysuria, frequency, hematuria, or incontinence  NEUROLOGICAL: No headaches, memory loss,numbness, or tremors  SKIN: No itching, burning, rashes, or lesions   LYMPH NODES: No enlarged glands  ENDOCRINE: No heat or cold intolerance; No hair loss  MUSCULOSKELETAL: No joint pain or swelling; No muscle, back, or extremity pain  PSYCHIATRIC: No depression, anxiety, mood swings, or difficulty sleeping  HEME/LYMPH: No easy bruising, or bleeding gums  ALLERGY AND IMMUNOLOGIC: No hives or eczema    Vital Signs Last 24 Hrs  T(C): 37.2 (14 May 2020 11:11), Max: 37.2 (14 May 2020 11:11)  T(F): 98.9 (14 May 2020 11:11), Max: 98.9 (14 May 2020 11:11)  HR: 105 (14 May 2020 11:11) (87 - 109)  BP: 141/71 (14 May 2020 11:11) (91/57 - 156/81)  BP(mean): 71 (13 May 2020 17:00) (65 - 89)  RR: 17 (14 May 2020 11:11) (9 - 18)  SpO2: 100% (14 May 2020 11:11) (85% - 100%)    PHYSICAL EXAM:  GENERAL: NAD, well-groomed, well-developed  HEAD:  Atraumatic, Normocephalic  EYES: EOMI, PERRLA, conjunctiva and sclera clear  NECK: Supple, No JVD, Normal thyroid  NERVOUS SYSTEM:  Alert & Oriented X3, Good concentration;   CHEST/LUNG: Clear to percussion bilaterally; No rales, rhonchi, wheezing, or rubs  HEART: Regular rate and rhythm; No murmurs, rubs, or gallops  ABDOMEN: Refused exam  EXTREMITIES:  2+ Peripheral Pulses, No clubbing, cyanosis, or edema  LYMPH: No lymphadenopathy noted  SKIN: No rashes or lesions    LAB                          9.7    13.59 )-----------( 220      ( 14 May 2020 10:37 )             32.1       CBC:   @ 10:37  WBC 13.59   Hgb 9.7   Hct 32.1   Plts 220  MCV 91.7   @ 04:09  WBC 13.78   Hgb 9.6   Hct 30.5   Plts 206  MCV 86.2   @ 23:31  WBC 15.42   Hgb 11.3   Hct 36.8   Plts 251  MCV 88.7   @ 12:00  WBC 15.42   Hgb 6.7   Hct 22.6   Plts 318  .1      Chemistry:   @ 10:37  Na+ 142  K+ 3.5  Cl- 107  CO2 28  BUN 39  Cr 1.28      @ 11:22  Na+ 142  K+ 4.6  Cl- 105  CO2 30  BUN 57  Cr 1.70      @ 04:09  Na+ 140  K+ 6.4  Cl- 107  CO2 23  BUN 56  Cr 1.81     0512 @ 23:31  Na+ 138  K+ 6.7  Cl- 108  CO2 20  BUN 54  Cr 1.64      @ 12:00  Na+ 145  K+ 6.3  Cl- 110  CO2 23  BUN 46  Cr 1.39         Glucose, Serum: 170 mg/dL ( @ 10:37)  Glucose, Serum: 106 mg/dL ( @ 11:22)  Glucose, Serum: 263 mg/dL ( @ 04:09)  Glucose, Serum: 302 mg/dL ( @ 23:31)  Glucose, Serum: 482 mg/dL ( @ 12:00)      14 May 2020 10:37    142    |  107    |  39     ----------------------------<  170    3.5     |  28     |  1.28   13 May 2020 11:22    142    |  105    |  57     ----------------------------<  106    4.6     |  30     |  1.70   13 May 2020 04:09    140    |  107    |  56     ----------------------------<  263    6.4     |  23     |  1.81   12 May 2020 23:31    138    |  108    |  54     ----------------------------<  302    6.7     |  20     |  1.64   12 May 2020 12:00    145    |  110    |  46     ----------------------------<  482    6.3     |  23     |  1.39     Ca    8.2        14 May 2020 10:37  Ca    8.0        13 May 2020 11:22  Ca    7.9        13 May 2020 04:09  Ca    7.8        12 May 2020 23:31  Ca    7.5        12 May 2020 12:00  Phos  4.4       14 May 2020 10:37  Phos  6.0       13 May 2020 11:22  Phos  5.6       13 May 2020 04:09  Phos  6.0       12 May 2020 23:31  Mg     2.4       14 May 2020 10:37  Mg     2.4       13 May 2020 11:22  Mg     2.6       13 May 2020 04:09  Mg     2.7       12 May 2020 23:31    TPro  5.2    /  Alb  2.2    /  TBili  0.3    /  DBili  x      /  AST  23     /  ALT  42     /  AlkPhos  65     13 May 2020 04:09  TPro  5.5    /  Alb  2.2    /  TBili  0.2    /  DBili  x      /  AST  26     /  ALT  38     /  AlkPhos  76     12 May 2020 12:00          Urinalysis Basic - ( 13 May 2020 09:00 )    Color: Yellow / Appearance: Slightly Turbid / S.010 / pH: x  Gluc: x / Ketone: Negative  / Bili: Negative / Urobili: Negative mg/dL   Blood: x / Protein: 500 mg/dL / Nitrite: Negative   Leuk Esterase: Moderate / RBC: 3-5 /HPF / WBC >50   Sq Epi: x / Non Sq Epi: Moderate / Bacteria: x        CAPILLARY BLOOD GLUCOSE      POCT Blood Glucose.: 176 mg/dL (14 May 2020 11:31)  POCT Blood Glucose.: 99 mg/dL (14 May 2020 08:02)  POCT Blood Glucose.: 112 mg/dL (13 May 2020 22:31)  POCT Blood Glucose.: 64 mg/dL (13 May 2020 21:57)  POCT Blood Glucose.: 79 mg/dL (13 May 2020 17:30)  POCT Blood Glucose.: 85 mg/dL (13 May 2020 13:51)          RADIOLOGY & ADDITIONAL TESTS:    Imaging Personally Reviewed:  [ ] YES  [ ] NO    Consultant(s) Notes Reviewed:  [ ] YES  [ ] NO    Care Discussed with Consultants/Other Providers [ ] YES  [ ] NO

## 2020-05-14 NOTE — OCCUPATIONAL THERAPY INITIAL EVALUATION ADULT - IMPAIRMENTS CONTRIBUTING IMPAIRED BED MOBILITY, REHAB EVAL
impaired balance/decreased flexibility/impaired motor control/decreased ROM/decreased strength/cognition/impaired coordination/abnormal muscle tone/ataxic/pain

## 2020-05-14 NOTE — OCCUPATIONAL THERAPY INITIAL EVALUATION ADULT - RANGE OF MOTION EXAMINATION, UPPER EXTREMITY
Left UE Passive ROM was WFL  (within functional limits)/Right UE Passive ROM was WFL  (within functional limits)/Right UE Active Assistive ROM was WFL  (within functional limits)

## 2020-05-14 NOTE — OCCUPATIONAL THERAPY INITIAL EVALUATION ADULT - ADDITIONAL COMMENTS
Prior to admission , pt stated she was non ambulatory for 2 years at home. Pt has 24 hours [facility]  service and a aspen lift. Pt emphasized "I do not use it because a have a strong son who takes me out of bed". Pt also has a commode , rolling walker, w/c and hospital bed. Twitching and arthritic changes noted in BUE. All prehension patterns are impaired in both hands, but is more pronounced in LE. Pt is right hand dominant and wears glasses for reading. ,

## 2020-05-14 NOTE — PROGRESS NOTE ADULT - ASSESSMENT
63 y/o F w/RA on prednisone, COPD, CHRIS, prior SBO s/p ex-lap complicated by evisceration, and ongoing psychiatric issues presenting with acute blood loss anemia secondary to GI bleed, SAM, and hyperkalemia. Empysemaptous cystitis and endometritis on CT scan.    Pt admitted to ICU, transfused 2 units PRBC, repeat H/H 11.3/ 36.8 post transfusion last night and 9.6/ 30.5 this am. No further rectal bleeding noted. Pt seen by GI, Dr. Duenas, for EGD/ colonoscopy when pt medically stable/ hyperkalemia resolved. K 6.4 this am, calcium gluconate, regular insulin, lokelma, albuterol given. Repeat K 4.6 at 1100 today. On zosyn IV for UTI. Pt seen by psych for verbal agression/ agitation. Pt does not have capacity to make medical decisions at this time. Seen by surgery, Dr. Matos, no surgical intervention at this time, for possible IR consultation for angio/embo vs repeat CTA if acute decompensation.      Assessment and Plan: 65 y/o F w/RA on prednisone, COPD, CHRIS, prior SBO s/p ex-lap complicated by evisceration, and ongoing psychiatric issues presenting with acute blood loss anemia secondary to GI bleed, SAM, and hyperkalemia. Empysemaptous cystitis and endometritis on CT scan. Was admitted to ICU initially then downgraded to gen med floor.     COVID neg     Assessment and Plan:   #Acute blood loss anemia secondary to GIB   s/p 2u PRBC   +FOBT   continue with PPI  seen by GI, Dr. Duenas, for EGD/ colonoscopy when pt medically stable   monitor H/H , transfuse prn , maintain active T&S    # Delirium due to multiple etiologies  verbal aggression/ agitation  seen by Psych, recs noted   PATIENT DOES NOT HAVE CAPACITY AT THIS TIME as of 5/12/20    #Emphysematous cystitis, endometritis, Sigmoid colitis  seen on CT   per surgery, No acute surgical intervention is warranted at this time.  continue with zosyn for total 7 days , currently day 2 /7  repeat CTA if acute decompensation.      #DM2,   A1c 6.4%  continue with ISS  d/c lantus   avoid hypoglycemia   will restart home dose prednisone, monitor FS, may need to add lantus again     #Essential HTN  c.w metoprolol, holding losartan     #COPD -c/w albuterol prn, advair, symbicort   CHRIS  CM to find out if patient is on CPAP at home     #Morbid obesity  nutrition consult     #functional quadriplegia   supportive care  frequent repositioning  seen by PT --recommend home with resumption of services     # RA, resume 15mg prednisone PO daily     #Preventative measures   SCDs-dvt ppx  fall and aspiration precautions   HOBE

## 2020-05-14 NOTE — OCCUPATIONAL THERAPY INITIAL EVALUATION ADULT - PERTINENT HX OF CURRENT PROBLEM, REHAB EVAL
Pt is 63 y/o female who presented to ER and was diagnosed with  Hyperkalemia, hyperglycemia and acute kidney injury. Pt was recently hospitalised in 3/2020 with encephalopathy and required blood transfusion

## 2020-05-14 NOTE — OCCUPATIONAL THERAPY INITIAL EVALUATION ADULT - GENERAL OBSERVATIONS, REHAB EVAL
Pt was seen for initial OT consult, encountered in bed on cardiac monitoring/ 2 liters of O2 via nasal. Pt is on contact isolation. Pt was observed with IV heplock and Antoine cath in place. Pt was AA&Ox4, cooperative & followed commands. Pt c/o right foot pain with palpation. LUE is none functional. Pt presents with generalized weakness, poor endurance. These limit pt's activity tolerance ,balance, ADL management &  functional mobility.

## 2020-05-14 NOTE — OCCUPATIONAL THERAPY INITIAL EVALUATION ADULT - PRECAUTIONS/LIMITATIONS, REHAB EVAL
cardiac precautions/Pt has a history of multiple comorbidities and diminished reaction time due to age related changes  Pt should be turned & positioned every 2 hours to prevent skin breakdown due to lack of mobility ./fall precautions

## 2020-05-15 LAB
ANION GAP SERPL CALC-SCNC: 8 MMOL/L — SIGNIFICANT CHANGE UP (ref 5–17)
BUN SERPL-MCNC: 20 MG/DL — SIGNIFICANT CHANGE UP (ref 7–23)
CALCIUM SERPL-MCNC: 8.7 MG/DL — SIGNIFICANT CHANGE UP (ref 8.5–10.1)
CHLORIDE SERPL-SCNC: 109 MMOL/L — HIGH (ref 96–108)
CO2 SERPL-SCNC: 29 MMOL/L — SIGNIFICANT CHANGE UP (ref 22–31)
CREAT SERPL-MCNC: 0.8 MG/DL — SIGNIFICANT CHANGE UP (ref 0.5–1.3)
GLUCOSE SERPL-MCNC: 89 MG/DL — SIGNIFICANT CHANGE UP (ref 70–99)
HCT VFR BLD CALC: 27.8 % — LOW (ref 34.5–45)
HGB BLD-MCNC: 8.7 G/DL — LOW (ref 11.5–15.5)
MAGNESIUM SERPL-MCNC: 2.2 MG/DL — SIGNIFICANT CHANGE UP (ref 1.6–2.6)
MCHC RBC-ENTMCNC: 27.4 PG — SIGNIFICANT CHANGE UP (ref 27–34)
MCHC RBC-ENTMCNC: 31.3 GM/DL — LOW (ref 32–36)
MCV RBC AUTO: 87.4 FL — SIGNIFICANT CHANGE UP (ref 80–100)
NRBC # BLD: 0 /100 WBCS — SIGNIFICANT CHANGE UP (ref 0–0)
PHOSPHATE SERPL-MCNC: 2.9 MG/DL — SIGNIFICANT CHANGE UP (ref 2.5–4.5)
PLATELET # BLD AUTO: 235 K/UL — SIGNIFICANT CHANGE UP (ref 150–400)
POTASSIUM SERPL-MCNC: 2.9 MMOL/L — CRITICAL LOW (ref 3.5–5.3)
POTASSIUM SERPL-SCNC: 2.9 MMOL/L — CRITICAL LOW (ref 3.5–5.3)
RBC # BLD: 3.18 M/UL — LOW (ref 3.8–5.2)
RBC # FLD: 24.9 % — HIGH (ref 10.3–14.5)
SODIUM SERPL-SCNC: 146 MMOL/L — HIGH (ref 135–145)
WBC # BLD: 10.94 K/UL — HIGH (ref 3.8–10.5)
WBC # FLD AUTO: 10.94 K/UL — HIGH (ref 3.8–10.5)

## 2020-05-15 PROCEDURE — 99233 SBSQ HOSP IP/OBS HIGH 50: CPT

## 2020-05-15 PROCEDURE — 99231 SBSQ HOSP IP/OBS SF/LOW 25: CPT

## 2020-05-15 RX ORDER — PANTOPRAZOLE SODIUM 20 MG/1
40 TABLET, DELAYED RELEASE ORAL EVERY 12 HOURS
Refills: 0 | Status: DISCONTINUED | OUTPATIENT
Start: 2020-05-15 | End: 2020-05-17

## 2020-05-15 RX ORDER — PIPERACILLIN AND TAZOBACTAM 4; .5 G/20ML; G/20ML
3.38 INJECTION, POWDER, LYOPHILIZED, FOR SOLUTION INTRAVENOUS EVERY 8 HOURS
Refills: 0 | Status: DISCONTINUED | OUTPATIENT
Start: 2020-05-15 | End: 2020-05-17

## 2020-05-15 RX ORDER — TIOTROPIUM BROMIDE 18 UG/1
1 CAPSULE ORAL; RESPIRATORY (INHALATION) AT BEDTIME
Refills: 0 | Status: DISCONTINUED | OUTPATIENT
Start: 2020-05-15 | End: 2020-05-17

## 2020-05-15 RX ORDER — IPRATROPIUM BROMIDE 0.2 MG/ML
1 SOLUTION, NON-ORAL INHALATION
Refills: 0 | Status: DISCONTINUED | OUTPATIENT
Start: 2020-05-15 | End: 2020-05-15

## 2020-05-15 RX ORDER — SIMVASTATIN 20 MG/1
20 TABLET, FILM COATED ORAL AT BEDTIME
Refills: 0 | Status: DISCONTINUED | OUTPATIENT
Start: 2020-05-15 | End: 2020-05-17

## 2020-05-15 RX ORDER — METOPROLOL TARTRATE 50 MG
25 TABLET ORAL
Refills: 0 | Status: DISCONTINUED | OUTPATIENT
Start: 2020-05-15 | End: 2020-05-17

## 2020-05-15 RX ORDER — ALBUTEROL 90 UG/1
2 AEROSOL, METERED ORAL EVERY 6 HOURS
Refills: 0 | Status: DISCONTINUED | OUTPATIENT
Start: 2020-05-15 | End: 2020-05-17

## 2020-05-15 RX ORDER — POTASSIUM CHLORIDE 20 MEQ
40 PACKET (EA) ORAL EVERY 4 HOURS
Refills: 0 | Status: COMPLETED | OUTPATIENT
Start: 2020-05-15 | End: 2020-05-15

## 2020-05-15 RX ORDER — THIAMINE MONONITRATE (VIT B1) 100 MG
100 TABLET ORAL DAILY
Refills: 0 | Status: DISCONTINUED | OUTPATIENT
Start: 2020-05-15 | End: 2020-05-17

## 2020-05-15 RX ORDER — BUDESONIDE AND FORMOTEROL FUMARATE DIHYDRATE 160; 4.5 UG/1; UG/1
2 AEROSOL RESPIRATORY (INHALATION)
Refills: 0 | Status: DISCONTINUED | OUTPATIENT
Start: 2020-05-15 | End: 2020-05-17

## 2020-05-15 RX ORDER — POTASSIUM CHLORIDE 20 MEQ
40 PACKET (EA) ORAL EVERY 4 HOURS
Refills: 0 | Status: DISCONTINUED | OUTPATIENT
Start: 2020-05-15 | End: 2020-05-15

## 2020-05-15 RX ADMIN — Medication 200 MICROGRAM(S): at 05:43

## 2020-05-15 RX ADMIN — TIOTROPIUM BROMIDE 1 CAPSULE(S): 18 CAPSULE ORAL; RESPIRATORY (INHALATION) at 22:20

## 2020-05-15 RX ADMIN — Medication 40 MILLIEQUIVALENT(S): at 09:13

## 2020-05-15 RX ADMIN — Medication 100 MILLIGRAM(S): at 12:28

## 2020-05-15 RX ADMIN — PIPERACILLIN AND TAZOBACTAM 25 GRAM(S): 4; .5 INJECTION, POWDER, LYOPHILIZED, FOR SOLUTION INTRAVENOUS at 05:43

## 2020-05-15 RX ADMIN — BUDESONIDE AND FORMOTEROL FUMARATE DIHYDRATE 2 PUFF(S): 160; 4.5 AEROSOL RESPIRATORY (INHALATION) at 17:20

## 2020-05-15 RX ADMIN — Medication 15 MILLIGRAM(S): at 09:13

## 2020-05-15 RX ADMIN — SIMVASTATIN 20 MILLIGRAM(S): 20 TABLET, FILM COATED ORAL at 22:20

## 2020-05-15 RX ADMIN — PIPERACILLIN AND TAZOBACTAM 25 GRAM(S): 4; .5 INJECTION, POWDER, LYOPHILIZED, FOR SOLUTION INTRAVENOUS at 12:29

## 2020-05-15 RX ADMIN — PANTOPRAZOLE SODIUM 40 MILLIGRAM(S): 20 TABLET, DELAYED RELEASE ORAL at 17:20

## 2020-05-15 RX ADMIN — Medication 25 MILLIGRAM(S): at 17:20

## 2020-05-15 RX ADMIN — PIPERACILLIN AND TAZOBACTAM 25 GRAM(S): 4; .5 INJECTION, POWDER, LYOPHILIZED, FOR SOLUTION INTRAVENOUS at 22:20

## 2020-05-15 RX ADMIN — Medication 40 MILLIEQUIVALENT(S): at 17:20

## 2020-05-15 RX ADMIN — Medication 40 MILLIEQUIVALENT(S): at 12:28

## 2020-05-15 RX ADMIN — PANTOPRAZOLE SODIUM 40 MILLIGRAM(S): 20 TABLET, DELAYED RELEASE ORAL at 09:13

## 2020-05-15 NOTE — DIETITIAN INITIAL EVALUATION ADULT. - PERTINENT MEDS FT
MEDICATIONS  (STANDING):  budesonide  80 MICROgram(s)/formoterol 4.5 MICROgram(s) Inhaler 2 Puff(s) Inhalation two times a day  dextrose 5%. 1000 milliLiter(s) (50 mL/Hr) IV Continuous <Continuous>  dextrose 50% Injectable 12.5 Gram(s) IV Push once  dextrose 50% Injectable 25 Gram(s) IV Push once  dextrose 50% Injectable 25 Gram(s) IV Push once  insulin lispro (HumaLOG) corrective regimen sliding scale   SubCutaneous three times a day before meals  insulin lispro (HumaLOG) corrective regimen sliding scale   SubCutaneous at bedtime  ipratropium 17 MICROgram(s) HFA Inhaler 1 Puff(s) Inhalation <User Schedule>  lactated ringers Bolus 500 milliLiter(s) IV Bolus once  levothyroxine 200 MICROGram(s) Oral daily  metoprolol tartrate 25 milliGRAM(s) Oral two times a day  pantoprazole  Injectable 40 milliGRAM(s) IV Push every 12 hours  piperacillin/tazobactam IVPB.. 3.375 Gram(s) IV Intermittent every 8 hours  potassium chloride   Powder 40 milliEquivalent(s) Oral every 4 hours  predniSONE   Tablet 15 milliGRAM(s) Oral daily  simvastatin 20 milliGRAM(s) Oral at bedtime  thiamine 100 milliGRAM(s) Oral daily    MEDICATIONS  (PRN):  ALBUTerol    90 MICROgram(s) HFA Inhaler 2 Puff(s) Inhalation every 6 hours PRN Shortness of Breath and/or Wheezing  dextrose 40% Gel 15 Gram(s) Oral once PRN Blood Glucose LESS THAN 70 milliGRAM(s)/deciliter  glucagon  Injectable 1 milliGRAM(s) IntraMuscular once PRN Glucose LESS THAN 70 milligrams/deciliter  OLANZapine 2.5 milliGRAM(s) Oral two times a day PRN agitation due to delirium

## 2020-05-15 NOTE — DIETITIAN INITIAL EVALUATION ADULT. - OTHER INFO
Pt lived c son who did the shopping/cooking PTA. Pt reports good appetite in general.  Diet PTA: no sugar, no sweets.   Pt denied hx of diabetes, stated that she had it few years ago, denied being on insulin PTA(As per H & P pt c hx of IDDM /outpatient medication review of meds, insulin glargine 34 units, Humalog 18 units before breakfast, 14 units before lunch, 10 units pm & prednisone use PTA).  Diet advanced today, pt requested hard boiled eggs and soda.   Pt educated on CHO controlled diet, pt declined written education.  Pt appeared to have impaired cognitive deficit, not a candidate for detailed education.   Pt s/p verbal and aggressive behavior, s/p behavioral health assessment c notation of consistent with bout of delirium & patient has no capacity to make her medical decisions at this time.  Pt s/p acute blood loss anemia secondary to GIB, sigmoid colitis, HTN, COPD, CHRIS, morbid obesity. Pt lived c son who did the shopping/cooking PTA. Pt reports good appetite in general.  Diet PTA: no sugar, no sweets.   Pt denied hx of diabetes, stated that she had it few years ago, denied being on insulin PTA(As per H & P pt c hx of IDDM /outpatient medication review of meds, insulin glargine 34 units, Humalog 18 units before breakfast, 14 units before lunch, 10 units pm & prednisone use PTA).  Diet advanced today, pt requested hard boiled eggs and soda.   Pt s/p verbal and aggressive behavior, s/p behavioral health assessment c notation of consistent with bout of delirium & patient has no capacity to make her medical decisions at this time.  Pt s/p acute blood loss anemia secondary to GIB, sigmoid colitis, HTN, COPD, CHRIS.   Pt educated on CHO controlled diet, pt declined written education.  Pt appeared to have impaired cognitive deficit, not a candidate for detailed education.  DELROY attempted to call  [facility]aven[facility] Hern[facility]dez [phone], w/o answer, message left c RD's contact #.

## 2020-05-15 NOTE — DIETITIAN INITIAL EVALUATION ADULT. - PHYSICAL APPEARANCE
BMI=40.9(05/12/20), 1+ generalized edema noted/obese/other (specify) other (specify)/obese/BMI=40.9(05/12/20), morbidly obese, 1+ generalized edema noted

## 2020-05-15 NOTE — DIETITIAN INITIAL EVALUATION ADULT. - ENERGY NEEDS
Height (cm): 154.9 (05-12)  Weight (kg): 98.2 (05-12)  BMI (kg/m2): 40.9 (05-12)  IBW:  47.6 kg        % IBW:  206%           UBW:  ?             %UBW: ?

## 2020-05-15 NOTE — PROGRESS NOTE ADULT - ASSESSMENT
65 y/o F w/RA on prednisone, COPD, CHRIS, prior SBO s/p ex-lap complicated by evisceration, and ongoing psychiatric issues presenting with acute blood loss anemia secondary to GI bleed, SAM, and hyperkalemia. Empysemaptous cystitis and endometritis on CT scan. Was admitted to ICU initially then downgraded to gen med floor.     COVID neg     Assessment and Plan:   #Acute blood loss anemia secondary to GIB   s/p 2u PRBC   +FOBT   continue with PPI  seen by GI, Dr. Duenas, for EGD/ colonoscopy when pt medically stable   monitor H/H , transfuse prn , maintain active T&S    # Delirium due to multiple etiologies  verbal aggression/ agitation  seen by Psych, recs noted   PATIENT DOES NOT HAVE CAPACITY AT THIS TIME as of 5/12/20    #Emphysematous cystitis, endometritis, Sigmoid colitis  seen on CT   per surgery, No acute surgical intervention is warranted at this time.  continue with zosyn for total 7 days ,   repeat CTA if acute decompensation.      #DM2,   A1c 6.4%  continue with ISS  d/c lantus   avoid hypoglycemia   will restart home dose prednisone, monitor FS, may need to add lantus again     #Essential HTN  c.w metoprolol, holding losartan     #COPD -c/w albuterol prn, advair, symbicort   CHRIS  CM to find out if patient is on CPAP at home     #Morbid obesity  nutrition consult     #functional quadriplegia   supportive care  frequent repositioning  seen by PT --recommend home with resumption of services     # RA, resume 15mg prednisone PO daily     #Preventative measures   SCDs-dvt ppx  fall and aspiration precautions   HOBE

## 2020-05-15 NOTE — PROGRESS NOTE BEHAVIORAL HEALTH - NSBHCHARTREVIEWVS_PSY_A_CORE FT
T(C): 37 (05-15-20 @ 17:36), Max: 37.1 (05-15-20 @ 05:04)  HR: 116 (05-15-20 @ 17:36) (105 - 116)  BP: 133/81 (05-15-20 @ 17:36) (125/72 - 136/65)  RR: 18 (05-15-20 @ 17:36) (17 - 18)  SpO2: 95% (05-15-20 @ 17:36) (94% - 100%)

## 2020-05-15 NOTE — PROGRESS NOTE BEHAVIORAL HEALTH - OTHER
deferred at this time slight latency "better" more reactive less confusion, much more linear improving improved

## 2020-05-15 NOTE — PROGRESS NOTE BEHAVIORAL HEALTH - NSBHFUPINTERVALHXFT_PSY_A_CORE
Patient is better as compared to admission - she is awake, alert, engages fully and maintains attention. Patient reported when she got her medication correctly, recalled her O2 sat value (correct as per chart) but did not recall being evaluated by PT. Patient talked about a Dr Nails who is her outpt doctor and said her back starts to hurt from these beds and she would like to go home.

## 2020-05-15 NOTE — DIETITIAN INITIAL EVALUATION ADULT. - NUTRITIONGOAL OUTCOME1
family to verbalize understanding of CHO controlled & calorie controlled diet for wt. loss 1-2#/week

## 2020-05-15 NOTE — DIETITIAN INITIAL EVALUATION ADULT. - FACTORS AFF FOOD INTAKE
s/p GI bleed/other (specify) other (specify)/s/p GI bleed, few missing teeth noted, denied chewing difficulty

## 2020-05-15 NOTE — PROGRESS NOTE BEHAVIORAL HEALTH - NSBHCONSULTMEDS_PSY_A_CORE FT
changed Zyprexa 2.5mg PO BID to PRN for agitation. Patient does not need a standing antipsychotic at this time
changed Zyprexa 2.5mg PO BID to PRN for agitation. Patient does not need a standing antipsychotic at this time

## 2020-05-15 NOTE — PROGRESS NOTE ADULT - ASSESSMENT
HPI:  64 year old female with a history of hypothyroidism, HTN, IDDM, COPD/CHRIS, RA (on Prednisone), SBO s/p ex lap with lysis of adhesions c/b evisceration,  recent hospitalization in 3/2020 with encephalopathy found to be significantly hypothyroid (, without myxedema coma), small (stable) right sided SDH, UTI, catatonic reaction to VPA (now resolved) and DKA (resolved) and ongoing psychiatric issues. Presents today with GI bleeding. She started having BRBPR about 4-5 hours ago, with numerous episodes of bloody stool since then. She denies any other symptoms - denies dyspnea/chest pain/light headedness/dizziness. Her son notes this involved several episodes of diarrhea w/ bloody stool after an enema earlier in the day.      In ED: Sinus tach, hypotensive and sPO2 87% on RA.  Labs remarkable for anemia, SAM, hyperglycemia, lactate 6.1, K 6.3, TSH 14  CTA shows < from: CT Angio Abdomen and Pelvis w/ IV Cont (05.12.20 @ 13:37) >  Emphysematous cystitis. Air is also present in the uterine wall consistent with endometritis or myometritis. Recommend surgical/urologic and OB/GYN evaluation.  Hyperemic sigmoid mucosa may indicate colitis. Sigmoid diverticulosis without diverticulitis. (12 May 2020 14:50)    -------------- As Above ------- Patient seen earlier today  The patient is a poor historian. Presents with multiple episodes of bloody BM.  In the ER, patient noted ti be temporarily hypotensive. Painless. Denies anticoagulants or NSAIDs.   Last colonoscopy (?). In ER , patient noted to have elevated potassium level.   No further bloody BMs since last seen.   History of SBO repair    GI Bleed, probably lower, probably diverticular  S/P 2 units PRBC 6.7 -- > 9.6 --> 9.7 --> 8.7 ( Decreased HGB probably  dilutional BUN /cr 39 / 1.28 --> 20/.0.80 )  Tolerating regular diet 1) EGD / colonoscopy as out patient. No signs of active bleeding. Covid policy only allows emergent procedures inpatient  --- Will follow on PRN basis.

## 2020-05-15 NOTE — DIETITIAN INITIAL EVALUATION ADULT. - PERTINENT LABORATORY DATA
05-15 Na146 mmol/L<H> Glu 89 mg/dL K+ 2.9 mmol/L<LL> Cr  0.80 mg/dL BUN 20 mg/dL 05-15 Phos 2.9 mg/dL 05-13 Alb 2.2 g/dL<L>05-13 ALT 42 U/L AST 23 U/L Alkaline Phosphatase 65 U/L

## 2020-05-15 NOTE — PROGRESS NOTE BEHAVIORAL HEALTH - NSBHCHARTREVIEWLAB_PSY_A_CORE FT
05-15    146<H>  |  109<H>  |  20  ----------------------------<  89  2.9<LL>   |  29  |  0.80    Ca    8.7      15 May 2020 06:31  Phos  2.9     05-15  Mg     2.2     05-15

## 2020-05-15 NOTE — PROGRESS NOTE BEHAVIORAL HEALTH - ORIENTATION OTHER
needs cuing for time; not clear about entire "situation" at this time
needs cuing for time which is expected

## 2020-05-15 NOTE — PROGRESS NOTE ADULT - SUBJECTIVE AND OBJECTIVE BOX
Patient is a 64y old  Female who presents with a chief complaint of GI bleed (14 May 2020 21:52)      INTERVAL HPI/OVERNIGHT EVENTS: no events     MEDICATIONS  (STANDING):  budesonide  80 MICROgram(s)/formoterol 4.5 MICROgram(s) Inhaler 2 Puff(s) Inhalation two times a day  dextrose 5%. 1000 milliLiter(s) (50 mL/Hr) IV Continuous <Continuous>  dextrose 50% Injectable 12.5 Gram(s) IV Push once  dextrose 50% Injectable 25 Gram(s) IV Push once  dextrose 50% Injectable 25 Gram(s) IV Push once  insulin lispro (HumaLOG) corrective regimen sliding scale   SubCutaneous three times a day before meals  insulin lispro (HumaLOG) corrective regimen sliding scale   SubCutaneous at bedtime  lactated ringers Bolus 500 milliLiter(s) IV Bolus once  levothyroxine 200 MICROGram(s) Oral daily  metoprolol tartrate 25 milliGRAM(s) Oral two times a day  pantoprazole  Injectable 40 milliGRAM(s) IV Push every 12 hours  piperacillin/tazobactam IVPB.. 3.375 Gram(s) IV Intermittent every 8 hours  potassium chloride   Powder 40 milliEquivalent(s) Oral every 4 hours  predniSONE   Tablet 15 milliGRAM(s) Oral daily  simvastatin 20 milliGRAM(s) Oral at bedtime  thiamine 100 milliGRAM(s) Oral daily  tiotropium 18 MICROgram(s) Capsule 1 Capsule(s) Inhalation at bedtime    MEDICATIONS  (PRN):  ALBUTerol    90 MICROgram(s) HFA Inhaler 2 Puff(s) Inhalation every 6 hours PRN Shortness of Breath and/or Wheezing  dextrose 40% Gel 15 Gram(s) Oral once PRN Blood Glucose LESS THAN 70 milliGRAM(s)/deciliter  glucagon  Injectable 1 milliGRAM(s) IntraMuscular once PRN Glucose LESS THAN 70 milligrams/deciliter  OLANZapine 2.5 milliGRAM(s) Oral two times a day PRN agitation due to delirium      Allergies    Depakote (Other)  Seroquel (Other (Severe))    Intolerances           Vital Signs Last 24 Hrs  T(C): 37.1 (15 May 2020 05:04), Max: 37.2 (14 May 2020 17:19)  T(F): 98.8 (15 May 2020 05:04), Max: 99 (14 May 2020 17:19)  HR: 105 (15 May 2020 05:04) (103 - 105)  BP: 130/66 (15 May 2020 05:04) (113/66 - 130/66)  BP(mean): --  RR: 17 (15 May 2020 05:04) (16 - 17)  SpO2: 95% (15 May 2020 05:04) (95% - 100%)    PHYSICAL EXAM:  GENERAL: NAD, well-groomed, well-developed  HEAD:  Atraumatic, Normocephalic  EYES: EOMI, PERRLA, conjunctiva and sclera clear  ENMT: No tonsillar erythema, exudates, or enlargement; Moist mucous membranes, Good dentition, No lesions  NECK: Supple, No JVD, Normal thyroid  NERVOUS SYSTEM:  Alert & Oriented X3, Good concentration; Motor Strength 5/5 B/L upper and lower extremities; DTRs 2+ intact and symmetric  CHEST/LUNG: Clear to percussion bilaterally; No rales, rhonchi, wheezing, or rubs  HEART: Regular rate and rhythm; No murmurs, rubs, or gallops  ABDOMEN: Soft, Nontender, Nondistended; Bowel sounds present  EXTREMITIES:  2+ Peripheral Pulses, No clubbing, cyanosis, or edema  LYMPH: No lymphadenopathy noted  SKIN: No rashes or lesions    LABS:                        8.7    10.94 )-----------( 235      ( 15 May 2020 06:31 )             27.8     05-15    146<H>  |  109<H>  |  20  ----------------------------<  89  2.9<LL>   |  29  |  0.80    Ca    8.7      15 May 2020 06:31  Phos  2.9     05-15  Mg     2.2     05-15          CAPILLARY BLOOD GLUCOSE      POCT Blood Glucose.: 191 mg/dL (15 May 2020 11:12)  POCT Blood Glucose.: 120 mg/dL (15 May 2020 07:51)  POCT Blood Glucose.: 129 mg/dL (14 May 2020 21:37)  POCT Blood Glucose.: 164 mg/dL (14 May 2020 16:16)      RADIOLOGY & ADDITIONAL TESTS:    Imaging Personally Reviewed:  [ X] YES  [ ] NO    Consultant(s) Notes Reviewed:  [ X] YES  [ ] NO    Care Discussed with Consultants/Other Providers [X ] YES  [ ] NO

## 2020-05-15 NOTE — PROGRESS NOTE ADULT - SUBJECTIVE AND OBJECTIVE BOX
Patient is a 64y old  Female who presents with a chief complaint of GI bleed (15 May 2020 11:55)      HPI:  64 year old female with a history of hypothyroidism, HTN, IDDM, COPD/CHRIS, RA (on Prednisone), SBO s/p ex lap with lysis of adhesions c/b evisceration,  recent hospitalization in 3/2020 with encephalopathy found to be significantly hypothyroid (, without myxedema coma), small (stable) right sided SDH, UTI, catatonic reaction to VPA (now resolved) and DKA (resolved) and ongoing psychiatric issues. Presents today with GI bleeding. She started having BRBPR about 4-5 hours ago, with numerous episodes of bloody stool since then. She denies any other symptoms - denies dyspnea/chest pain/light headedness/dizziness. Her son notes this involved several episodes of diarrhea w/ bloody stool after an enema earlier in the day.    In ED: Sinus tach, hypotensive and sPO2 87% on RA.  Labs remarkable for anemia, SAM, hyperglycemia, lactate 6.1, K 6.3, TSH 14  CTA shows < from: CT Angio Abdomen and Pelvis w/ IV Cont (20 @ 13:37) >  Emphysematous cystitis. Air is also present in the uterine wall consistent with endometritis or myometritis. Recommend surgical/urologic and OB/GYN evaluation.  Hyperemic sigmoid mucosa may indicate colitis. Sigmoid diverticulosis without diverticulitis. (12 May 2020 14:50)      INTERVAL HPI/OVERNIGHT EVENTS:  The patient denies melena, hematochezia, hematemesis, nausea, vomiting, abdominal pain, constipation, diarrhea, or change in bowel movements Tolerating regular diet    MEDICATIONS  (STANDING):  budesonide  80 MICROgram(s)/formoterol 4.5 MICROgram(s) Inhaler 2 Puff(s) Inhalation two times a day  dextrose 5%. 1000 milliLiter(s) (50 mL/Hr) IV Continuous <Continuous>  dextrose 50% Injectable 12.5 Gram(s) IV Push once  dextrose 50% Injectable 25 Gram(s) IV Push once  dextrose 50% Injectable 25 Gram(s) IV Push once  insulin lispro (HumaLOG) corrective regimen sliding scale   SubCutaneous three times a day before meals  insulin lispro (HumaLOG) corrective regimen sliding scale   SubCutaneous at bedtime  lactated ringers Bolus 500 milliLiter(s) IV Bolus once  levothyroxine 200 MICROGram(s) Oral daily  metoprolol tartrate 25 milliGRAM(s) Oral two times a day  pantoprazole  Injectable 40 milliGRAM(s) IV Push every 12 hours  piperacillin/tazobactam IVPB.. 3.375 Gram(s) IV Intermittent every 8 hours  potassium chloride   Powder 40 milliEquivalent(s) Oral every 4 hours  predniSONE   Tablet 15 milliGRAM(s) Oral daily  simvastatin 20 milliGRAM(s) Oral at bedtime  thiamine 100 milliGRAM(s) Oral daily  tiotropium 18 MICROgram(s) Capsule 1 Capsule(s) Inhalation at bedtime    MEDICATIONS  (PRN):  ALBUTerol    90 MICROgram(s) HFA Inhaler 2 Puff(s) Inhalation every 6 hours PRN Shortness of Breath and/or Wheezing  dextrose 40% Gel 15 Gram(s) Oral once PRN Blood Glucose LESS THAN 70 milliGRAM(s)/deciliter  glucagon  Injectable 1 milliGRAM(s) IntraMuscular once PRN Glucose LESS THAN 70 milligrams/deciliter  OLANZapine 2.5 milliGRAM(s) Oral two times a day PRN agitation due to delirium      FAMILY HISTORY:  No pertinent family history in first degree relatives      Allergies    Depakote (Other)  Seroquel (Other (Severe))    Intolerances        PMH/PSH:  Diverticulosis  Cellulitis  CVA (Cerebral Vascular Accident)  RA (Rheumatoid Arthritis)  Tooth Abscess  Arthritis  Cigarette Smoker  Chronic Asthma  Adult Hypothyroidism  Borderline Diabetes Mellitus  High Cholesterol  H/O: HTN  Wrist Disorder  History of  Section        REVIEW OF SYSTEMS:  CONSTITUTIONAL: No fever, weight loss, or fatigue  EYES: No eye pain, visual disturbances, or discharge  ENMT:  No difficulty hearing, tinnitus, vertigo; No sinus or throat pain  NECK: No pain or stiffness  BREASTS: No pain, masses, or nipple discharge  RESPIRATORY: No cough, wheezing, chills or hemoptysis; No shortness of breath  CARDIOVASCULAR: No chest pain, palpitations, dizziness, or leg swelling  GASTROINTESTINAL:  see above  GENITOURINARY: No dysuria, frequency, hematuria, or incontinence  NEUROLOGICAL: No headaches, memory loss, loss of strength, numbness, or tremors  SKIN: No itching, burning, rashes, or lesions   LYMPH NODES: No enlarged glands  ENDOCRINE: No heat or cold intolerance; No hair loss  MUSCULOSKELETAL: No joint pain or swelling; No muscle, back, or extremity pain  PSYCHIATRIC: No depression, anxiety, mood swings, or difficulty sleeping  HEME/LYMPH: No easy bruising, or bleeding gums  ALLERGY AND IMMUNOLOGIC: No hives or eczema    Vital Signs Last 24 Hrs  T(C): 36.8 (15 May 2020 12:20), Max: 37.2 (14 May 2020 17:19)  T(F): 98.2 (15 May 2020 12:20), Max: 99 (14 May 2020 17:19)  HR: 109 (15 May 2020 15:) (103 - 113)  BP: 125/72 (15 May 2020 15:) (113/66 - 136/65)  BP(mean): --  RR: 18 (15 May 2020 12:20) (16 - 18)  SpO2: 100% (15 May 2020 15:) (94% - 100%)    PHYSICAL EXAM:  GENERAL: NAD, well-groomed, well-developed  HEAD:  Atraumatic, Normocephalic  EYES: EOMI, PERRLA, conjunctiva and sclera clear  NECK: Supple, No JVD, Normal thyroid  NERVOUS SYSTEM:  Alert & Oriented X3, Good concentration; Motor Strength 5/5 B/L upper and lower extremities;   CHEST/LUNG: Clear to percussion bilaterally; No rales, rhonchi, wheezing, or rubs  HEART: Regular rate and rhythm; No murmurs, rubs, or gallops  ABDOMEN: Soft, Nontender, Nondistended; Bowel sounds present  EXTREMITIES:  2+ Peripheral Pulses, No clubbing, cyanosis, or edema  LYMPH: No lymphadenopathy noted  SKIN: No rashes or lesions    LAB                          8.7    10.94 )-----------( 235      ( 15 May 2020 06:31 )             27.8       CBC:  -15 @ 06:31  WBC 10.94   Hgb 8.7   Hct 27.8   Plts 235  MCV 87.4  05-14 @ 10:37  WBC 13.59   Hgb 9.7   Hct 32.1   Plts 220  MCV 91.7  05-13 @ 04:09  WBC 13.78   Hgb 9.6   Hct 30.5   Plts 206  MCV 86.2  05-12 @ 23:31  WBC 15.42   Hgb 11.3   Hct 36.8   Plts 251  MCV 88.7  05-12 @ 12:00  WBC 15.42   Hgb 6.7   Hct 22.6   Plts 318  .1      Chemistry:  05-15 @ 06:31  Na+ 146  K+ 2.9  Cl- 109  CO2 29  BUN 20  Cr 0.80      @ 10:37  Na+ 142  K+ 3.5  Cl- 107  CO2 28  BUN 39  Cr 1.28      @ 11:22  Na+ 142  K+ 4.6  Cl- 105  CO2 30  BUN 57  Cr 1.70      @ 04:09  Na+ 140  K+ 6.4  Cl- 107  CO2 23  BUN 56  Cr 1.81      @ 23:31  Na+ 138  K+ 6.7  Cl- 108  CO2 20  BUN 54  Cr 1.64      @ 12:00  Na+ 145  K+ 6.3  Cl- 110  CO2 23  BUN 46  Cr 1.39         Glucose, Serum: 89 mg/dL (05-15 @ 06:31)  Glucose, Serum: 170 mg/dL ( @ 10:37)  Glucose, Serum: 106 mg/dL ( @ 11:22)  Glucose, Serum: 263 mg/dL ( @ 04:09)  Glucose, Serum: 302 mg/dL ( @ 23:31)  Glucose, Serum: 482 mg/dL ( @ 12:00)      15 May 2020 06:31    146    |  109    |  20     ----------------------------<  89     2.9     |  29     |  0.80   14 May 2020 10:37    142    |  107    |  39     ----------------------------<  170    3.5     |  28     |  1.28   13 May 2020 11:22    142    |  105    |  57     ----------------------------<  106    4.6     |  30     |  1.70   13 May 2020 04:09    140    |  107    |  56     ----------------------------<  263    6.4     |  23     |  1.81   12 May 2020 23:31    138    |  108    |  54     ----------------------------<  302    6.7     |  20     |  1.64   12 May 2020 12:00    145    |  110    |  46     ----------------------------<  482    6.3     |  23     |  1.39     Ca    8.7        15 May 2020 06:31  Ca    8.2        14 May 2020 10:37  Ca    8.0        13 May 2020 11:22  Ca    7.9        13 May 2020 04:09  Ca    7.8        12 May 2020 23:31  Ca    7.5        12 May 2020 12:00  Phos  2.9       15 May 2020 06:31  Phos  4.4       14 May 2020 10:37  Phos  6.0       13 May 2020 11:22  Phos  5.6       13 May 2020 04:09  Phos  6.0       12 May 2020 23:31  Mg     2.2       15 May 2020 06:31  Mg     2.4       14 May 2020 10:37  Mg     2.4       13 May 2020 11:22  Mg     2.6       13 May 2020 04:09  Mg     2.7       12 May 2020 23:31    TPro  5.2    /  Alb  2.2    /  TBili  0.3    /  DBili  x      /  AST  23     /  ALT  42     /  AlkPhos  65     13 May 2020 04:09  TPro  5.5    /  Alb  2.2    /  TBili  0.2    /  DBili  x      /  AST  26     /  ALT  38     /  AlkPhos  76     12 May 2020 12:00              CAPILLARY BLOOD GLUCOSE      POCT Blood Glucose.: 270 mg/dL (15 May 2020 16:12)  POCT Blood Glucose.: 191 mg/dL (15 May 2020 11:12)  POCT Blood Glucose.: 120 mg/dL (15 May 2020 07:51)  POCT Blood Glucose.: 129 mg/dL (14 May 2020 21:37)          RADIOLOGY & ADDITIONAL TESTS:    Imaging Personally Reviewed:  [ ] YES  [ ] NO    Consultant(s) Notes Reviewed:  [ ] YES  [ ] NO    Care Discussed with Consultants/Other Providers [ ] YES  [ ] NO

## 2020-05-15 NOTE — PROGRESS NOTE BEHAVIORAL HEALTH - SUMMARY
- initial presentation consistent with bout of delirium; MSE improved as of 5/15/20  - Patient would like to go home at this time and GI is not doing an EGD as per today's note.   - forcing Patient to go to a [facility] would not be beneficial to Patient as she will continue to refuse; coordinate DC plan involving son whom she lives with

## 2020-05-15 NOTE — CHART NOTE - NSCHARTNOTEFT_GEN_A_CORE
Upon Nutritional Assessment by the Registered Dietitian your patient was determined to meet criteria / has evidence of the following diagnosis/diagnoses:          [ ]  Mild Protein Calorie Malnutrition        [ ]  Moderate Protein Calorie Malnutrition        [ ] Severe Protein Calorie Malnutrition        [ ] Unspecified Protein Calorie Malnutrition        [ ] Underweight / BMI <19        [x ] Morbid Obesity / BMI > 40      Findings as based on:  •  Comprehensive nutrition assessment and consultation  •  Calorie counts (nutrient intake analysis)  •  Food acceptance and intake status from observations by staff  •  Follow up  •  Patient education  •  Intervention secondary to interdisciplinary rounds  •   concerns      Treatment:    The following diet has been recommended:  Continue c current diet regimen, soft, consistent carbohydrate c evening snack , DASH/ TLC       PROVIDER Section:     By signing this assessment you are acknowledging and agree with the diagnosis/diagnoses assigned by the Registered Dietitian    Comments:

## 2020-05-15 NOTE — PROGRESS NOTE BEHAVIORAL HEALTH - RISK ASSESSMENT
Chronic risk factors: chronic medical conditions and morbid obesity impairing daily functioning/mobility/quality of life Protective factors: age < 65 yrs; female gender; medication and treatment compliant; no psych history; no substance use;  no suicide attempts; no self-injurious behavior; no hx of aggression/violence; no legal issues; stable domicile; able to verbalize needs; strong family support; access to health services. No acute risk factors identified

## 2020-05-16 ENCOUNTER — TRANSCRIPTION ENCOUNTER (OUTPATIENT)
Age: 65
End: 2020-05-16

## 2020-05-16 LAB
ANION GAP SERPL CALC-SCNC: 6 MMOL/L — SIGNIFICANT CHANGE UP (ref 5–17)
BUN SERPL-MCNC: 10 MG/DL — SIGNIFICANT CHANGE UP (ref 7–23)
CALCIUM SERPL-MCNC: 8.8 MG/DL — SIGNIFICANT CHANGE UP (ref 8.5–10.1)
CHLORIDE SERPL-SCNC: 109 MMOL/L — HIGH (ref 96–108)
CO2 SERPL-SCNC: 27 MMOL/L — SIGNIFICANT CHANGE UP (ref 22–31)
CREAT SERPL-MCNC: 0.81 MG/DL — SIGNIFICANT CHANGE UP (ref 0.5–1.3)
GLUCOSE SERPL-MCNC: 239 MG/DL — HIGH (ref 70–99)
HCT VFR BLD CALC: 26.8 % — LOW (ref 34.5–45)
HGB BLD-MCNC: 8.6 G/DL — LOW (ref 11.5–15.5)
MCHC RBC-ENTMCNC: 28.1 PG — SIGNIFICANT CHANGE UP (ref 27–34)
MCHC RBC-ENTMCNC: 32.1 GM/DL — SIGNIFICANT CHANGE UP (ref 32–36)
MCV RBC AUTO: 87.6 FL — SIGNIFICANT CHANGE UP (ref 80–100)
NRBC # BLD: 0 /100 WBCS — SIGNIFICANT CHANGE UP (ref 0–0)
PLATELET # BLD AUTO: 262 K/UL — SIGNIFICANT CHANGE UP (ref 150–400)
POTASSIUM SERPL-MCNC: 3.9 MMOL/L — SIGNIFICANT CHANGE UP (ref 3.5–5.3)
POTASSIUM SERPL-SCNC: 3.9 MMOL/L — SIGNIFICANT CHANGE UP (ref 3.5–5.3)
RBC # BLD: 3.06 M/UL — LOW (ref 3.8–5.2)
RBC # FLD: 25 % — HIGH (ref 10.3–14.5)
SODIUM SERPL-SCNC: 142 MMOL/L — SIGNIFICANT CHANGE UP (ref 135–145)
WBC # BLD: 12.7 K/UL — HIGH (ref 3.8–10.5)
WBC # FLD AUTO: 12.7 K/UL — HIGH (ref 3.8–10.5)

## 2020-05-16 PROCEDURE — 99239 HOSP IP/OBS DSCHRG MGMT >30: CPT

## 2020-05-16 RX ORDER — LEVOTHYROXINE SODIUM 125 MCG
1 TABLET ORAL
Qty: 0 | Refills: 0 | DISCHARGE
Start: 2020-05-16

## 2020-05-16 RX ORDER — INSULIN LISPRO 100/ML
18 VIAL (ML) SUBCUTANEOUS
Qty: 0 | Refills: 0 | DISCHARGE

## 2020-05-16 RX ORDER — ACETAMINOPHEN 500 MG
650 TABLET ORAL EVERY 6 HOURS
Refills: 0 | Status: DISCONTINUED | OUTPATIENT
Start: 2020-05-16 | End: 2020-05-17

## 2020-05-16 RX ORDER — MOXIFLOXACIN HYDROCHLORIDE TABLETS, 400 MG 400 MG/1
1 TABLET, FILM COATED ORAL
Qty: 10 | Refills: 0
Start: 2020-05-16 | End: 2020-05-20

## 2020-05-16 RX ORDER — INSULIN LISPRO 100/ML
14 VIAL (ML) SUBCUTANEOUS
Qty: 0 | Refills: 0 | DISCHARGE

## 2020-05-16 RX ADMIN — BUDESONIDE AND FORMOTEROL FUMARATE DIHYDRATE 2 PUFF(S): 160; 4.5 AEROSOL RESPIRATORY (INHALATION) at 05:49

## 2020-05-16 RX ADMIN — PIPERACILLIN AND TAZOBACTAM 25 GRAM(S): 4; .5 INJECTION, POWDER, LYOPHILIZED, FOR SOLUTION INTRAVENOUS at 05:56

## 2020-05-16 RX ADMIN — Medication 25 MILLIGRAM(S): at 17:55

## 2020-05-16 RX ADMIN — BUDESONIDE AND FORMOTEROL FUMARATE DIHYDRATE 2 PUFF(S): 160; 4.5 AEROSOL RESPIRATORY (INHALATION) at 19:23

## 2020-05-16 RX ADMIN — PIPERACILLIN AND TAZOBACTAM 25 GRAM(S): 4; .5 INJECTION, POWDER, LYOPHILIZED, FOR SOLUTION INTRAVENOUS at 21:24

## 2020-05-16 RX ADMIN — PIPERACILLIN AND TAZOBACTAM 25 GRAM(S): 4; .5 INJECTION, POWDER, LYOPHILIZED, FOR SOLUTION INTRAVENOUS at 11:21

## 2020-05-16 RX ADMIN — PANTOPRAZOLE SODIUM 40 MILLIGRAM(S): 20 TABLET, DELAYED RELEASE ORAL at 17:55

## 2020-05-16 RX ADMIN — Medication 200 MICROGRAM(S): at 05:54

## 2020-05-16 RX ADMIN — PANTOPRAZOLE SODIUM 40 MILLIGRAM(S): 20 TABLET, DELAYED RELEASE ORAL at 05:49

## 2020-05-16 RX ADMIN — Medication 25 MILLIGRAM(S): at 06:02

## 2020-05-16 RX ADMIN — TIOTROPIUM BROMIDE 1 CAPSULE(S): 18 CAPSULE ORAL; RESPIRATORY (INHALATION) at 21:24

## 2020-05-16 RX ADMIN — SIMVASTATIN 20 MILLIGRAM(S): 20 TABLET, FILM COATED ORAL at 21:24

## 2020-05-16 RX ADMIN — Medication 100 MILLIGRAM(S): at 11:21

## 2020-05-16 RX ADMIN — Medication 1: at 08:03

## 2020-05-16 RX ADMIN — Medication 15 MILLIGRAM(S): at 05:56

## 2020-05-16 NOTE — DISCHARGE NOTE PROVIDER - HOSPITAL COURSE
63 y/o F w/RA on prednisone, COPD, CHRIS, prior SBO s/p ex-lap complicated by evisceration, and ongoing psychiatric issues presenting with acute blood loss anemia secondary to GI bleed, SAM, and hyperkalemia. Empysemaptous cystitis and endometritis on CT scan. Was admitted to ICU initially then downgraded to gen med floor.         COVID neg         Assessment and Plan:     #Acute blood loss anemia secondary to GIB     s/p 2u PRBC     +FOBT     continue with PPI    seen by GI, Dr. Duenas, for EGD/ colonoscopy as outpatient     h/h stable         # Delirium due to multiple etiologies    verbal aggression/ agitation    seen by Psych, signed off stable for dc         #Emphysematous cystitis, endometritis, Sigmoid colitis    seen on CT     per surgery, No acute surgical intervention is warranted at this time.    stable cultures negativee, po abx             #DM2,     A1c 6.4%    continue with ISS    d/c lantus     avoid hypoglycemia     will restart home dose prednisone, monitor FS, may need to add lantus again         #Essential HTN    c.w metoprolol, holding losartan         #COPD -c/w albuterol prn, advair, symbicort     CHRIS    CM to find out if patient is on CPAP at home         #Morbid obesity    nutrition consult         #functional quadriplegia     supportive care    frequent repositioning    seen by PT --recommend home with resumption of services         # RA, resume 15mg prednisone PO daily         #Preventative measures     SCDs-dvt ppx    fall and aspiration precautions     HOBE                     Attending Attestation:     35 minutes spent on total dc encounter; more than 50% of the visit was spent counseling and/or coordinating care by the attending physician.

## 2020-05-16 NOTE — DISCHARGE NOTE PROVIDER - NSDCCPCAREPLAN_GEN_ALL_CORE_FT
PRINCIPAL DISCHARGE DIAGNOSIS  Diagnosis: GI bleed  Assessment and Plan of Treatment: follow up with gi for colonoscopy/egd  finish abx for uti      SECONDARY DISCHARGE DIAGNOSES  Diagnosis: SAM (acute kidney injury)  Assessment and Plan of Treatment:     Diagnosis: Hyperglycemia  Assessment and Plan of Treatment:     Diagnosis: Hyperkalemia  Assessment and Plan of Treatment:

## 2020-05-16 NOTE — DISCHARGE NOTE PROVIDER - NSDCMRMEDTOKEN_GEN_ALL_CORE_FT
Advair Diskus 250 mcg-50 mcg inhalation powder: 1 puff(s) inhaled once a day  albuterol 90 mcg/inh inhalation aerosol: 2 puff(s) inhaled 4 times a day  Cipro 500 mg oral tablet: 1 tab(s) orally 2 times a day   Lantus Solostar Pen 100 units/mL subcutaneous solution: 34 unit(s) subcutaneous once a day (in the morning)   levothyroxine 200 mcg (0.2 mg) oral tablet: 1 tab(s) orally once a day  melatonin 3 mg oral tablet: 1 tab(s) orally once a day (at bedtime), As needed, Insomnia  metoprolol tartrate 25 mg oral tablet: 1 tab(s) orally 2 times a day  OLANZapine 2.5 mg oral tablet: 1 tab(s) orally 2 times a day  pantoprazole 40 mg oral delayed release tablet: 1 tab(s) orally once a day   predniSONE 5 mg oral tablet: 3 tab(s) orally once a day  simvastatin 20 mg oral tablet: 1 tab(s) orally once a day (at bedtime)  thiamine 100 mg oral tablet: 1 tab(s) orally once a day

## 2020-05-17 ENCOUNTER — TRANSCRIPTION ENCOUNTER (OUTPATIENT)
Age: 65
End: 2020-05-17

## 2020-05-17 VITALS
OXYGEN SATURATION: 98 % | DIASTOLIC BLOOD PRESSURE: 80 MMHG | HEART RATE: 98 BPM | TEMPERATURE: 98 F | RESPIRATION RATE: 18 BRPM | SYSTOLIC BLOOD PRESSURE: 136 MMHG

## 2020-05-17 PROCEDURE — 99232 SBSQ HOSP IP/OBS MODERATE 35: CPT

## 2020-05-17 RX ADMIN — PANTOPRAZOLE SODIUM 40 MILLIGRAM(S): 20 TABLET, DELAYED RELEASE ORAL at 05:51

## 2020-05-17 RX ADMIN — PIPERACILLIN AND TAZOBACTAM 25 GRAM(S): 4; .5 INJECTION, POWDER, LYOPHILIZED, FOR SOLUTION INTRAVENOUS at 05:51

## 2020-05-17 RX ADMIN — BUDESONIDE AND FORMOTEROL FUMARATE DIHYDRATE 2 PUFF(S): 160; 4.5 AEROSOL RESPIRATORY (INHALATION) at 07:19

## 2020-05-17 RX ADMIN — Medication 200 MICROGRAM(S): at 05:50

## 2020-05-17 RX ADMIN — Medication 15 MILLIGRAM(S): at 05:51

## 2020-05-17 RX ADMIN — Medication 25 MILLIGRAM(S): at 05:51

## 2020-05-17 RX ADMIN — PIPERACILLIN AND TAZOBACTAM 25 GRAM(S): 4; .5 INJECTION, POWDER, LYOPHILIZED, FOR SOLUTION INTRAVENOUS at 13:08

## 2020-05-17 NOTE — DISCHARGE NOTE NURSING/CASE MANAGEMENT/SOCIAL WORK - NSDCVIVACCINE_GEN_ALL_CORE_FT
Writer left voicemail for patient to return call back to receive below message in regards to recent EGD.    Pneumococcal , 2013/12/14 11:43 , Lloyd Johnson (RN)

## 2020-05-17 NOTE — PROGRESS NOTE ADULT - SUBJECTIVE AND OBJECTIVE BOX
Patient is a 64y old  Female who presents with a chief complaint of GI bleed (16 May 2020 11:40)      INTERVAL HPI/OVERNIGHT EVENTS: no events     MEDICATIONS  (STANDING):  budesonide  80 MICROgram(s)/formoterol 4.5 MICROgram(s) Inhaler 2 Puff(s) Inhalation two times a day  dextrose 5%. 1000 milliLiter(s) (50 mL/Hr) IV Continuous <Continuous>  dextrose 50% Injectable 12.5 Gram(s) IV Push once  dextrose 50% Injectable 25 Gram(s) IV Push once  dextrose 50% Injectable 25 Gram(s) IV Push once  insulin lispro (HumaLOG) corrective regimen sliding scale   SubCutaneous three times a day before meals  insulin lispro (HumaLOG) corrective regimen sliding scale   SubCutaneous at bedtime  lactated ringers Bolus 500 milliLiter(s) IV Bolus once  levothyroxine 200 MICROGram(s) Oral daily  metoprolol tartrate 25 milliGRAM(s) Oral two times a day  pantoprazole  Injectable 40 milliGRAM(s) IV Push every 12 hours  piperacillin/tazobactam IVPB.. 3.375 Gram(s) IV Intermittent every 8 hours  predniSONE   Tablet 15 milliGRAM(s) Oral daily  simvastatin 20 milliGRAM(s) Oral at bedtime  thiamine 100 milliGRAM(s) Oral daily  tiotropium 18 MICROgram(s) Capsule 1 Capsule(s) Inhalation at bedtime    MEDICATIONS  (PRN):  acetaminophen   Tablet .. 650 milliGRAM(s) Oral every 6 hours PRN Moderate Pain (4 - 6)  ALBUTerol    90 MICROgram(s) HFA Inhaler 2 Puff(s) Inhalation every 6 hours PRN Shortness of Breath and/or Wheezing  dextrose 40% Gel 15 Gram(s) Oral once PRN Blood Glucose LESS THAN 70 milliGRAM(s)/deciliter  glucagon  Injectable 1 milliGRAM(s) IntraMuscular once PRN Glucose LESS THAN 70 milligrams/deciliter  OLANZapine 2.5 milliGRAM(s) Oral two times a day PRN agitation due to delirium      Allergies    Depakote (Other)  Seroquel (Other (Severe))    Intolerances       Vital Signs Last 24 Hrs  T(C): 37.4 (17 May 2020 05:32), Max: 37.4 (17 May 2020 05:32)  T(F): 99.3 (17 May 2020 05:32), Max: 99.3 (17 May 2020 05:32)  HR: 98 (17 May 2020 05:32) (94 - 101)  BP: 158/89 (17 May 2020 05:32) (120/79 - 158/89)  BP(mean): --  RR: 18 (17 May 2020 05:32) (17 - 18)  SpO2: 96% (17 May 2020 05:32) (95% - 96%)    PHYSICAL EXAM:  GENERAL: NAD, well-groomed, well-developed  HEAD:  Atraumatic, Normocephalic  EYES: EOMI, PERRLA, conjunctiva and sclera clear  ENMT: No tonsillar erythema, exudates, or enlargement; Moist mucous membranes, Good dentition, No lesions  NECK: Supple, No JVD, Normal thyroid  NERVOUS SYSTEM:  Alert & Oriented X3, Good concentration; Motor Strength 5/5 B/L upper and lower extremities; DTRs 2+ intact and symmetric  CHEST/LUNG: Clear to percussion bilaterally; No rales, rhonchi, wheezing, or rubs  HEART: Regular rate and rhythm; No murmurs, rubs, or gallops  ABDOMEN: Soft, Nontender, Nondistended; Bowel sounds present  EXTREMITIES:  2+ Peripheral Pulses, No clubbing, cyanosis, or edema  LYMPH: No lymphadenopathy noted  SKIN: No rashes or lesions    LABS:                        8.6    12.70 )-----------( 262      ( 16 May 2020 10:31 )             26.8     05-16    142  |  109<H>  |  10  ----------------------------<  239<H>  3.9   |  27  |  0.81    Ca    8.8      16 May 2020 10:31          CAPILLARY BLOOD GLUCOSE          RADIOLOGY & ADDITIONAL TESTS:    Imaging Personally Reviewed:  [ X] YES  [ ] NO    Consultant(s) Notes Reviewed:  [ X] YES  [ ] NO    Care Discussed with Consultants/Other Providers [X ] YES  [ ] NO

## 2020-05-17 NOTE — PROGRESS NOTE ADULT - ASSESSMENT
65 y/o F w/RA on prednisone, COPD, CHRIS, prior SBO s/p ex-lap complicated by evisceration, and ongoing psychiatric issues presenting with acute blood loss anemia secondary to GI bleed, SAM, and hyperkalemia. Empysemaptous cystitis and endometritis on CT scan. Was admitted to ICU initially then downgraded to gen med floor.     COVID neg   DC HOME today   Assessment and Plan:   #Acute blood loss anemia secondary to GIB   s/p 2u PRBC   +FOBT   continue with PPI  seen by GI, Dr. Duenas, for EGD/ colonoscopy as outpt       # Delirium due to multiple etiologies  verbal aggression/ agitation  seen by Psych, recs noted        #Emphysematous cystitis, endometritis, Sigmoid colitis  seen on CT   per surgery, No acute surgical intervention is warranted at this time.  continue with zosyn for total 7 days ,   repeat CTA if acute decompensation.      #DM2,   A1c 6.4%  continue with ISS  d/c lantus   avoid hypoglycemia   will restart home dose prednisone, monitor FS, may need to add lantus again     #Essential HTN  c.w metoprolol, holding losartan     #COPD -c/w albuterol prn, advair, symbicort   CHRIS  CM to find out if patient is on CPAP at home     #Morbid obesity  nutrition consult     #functional quadriplegia   supportive care  frequent repositioning  seen by PT --recommend home with resumption of services     # RA, resume 15mg prednisone PO daily     #Preventative measures   SCDs-dvt ppx  fall and aspiration precautions   HOBE

## 2020-05-17 NOTE — DISCHARGE NOTE NURSING/CASE MANAGEMENT/SOCIAL WORK - PATIENT PORTAL LINK FT
You can access the FollowMyHealth Patient Portal offered by [facility] by registering at the following website: [email or web address]. By joining [employer]’s FollowMyHealth portal, you will also be able to view your health information using other applications (apps) compatible with our system.

## 2020-05-18 LAB
CULTURE RESULTS: SIGNIFICANT CHANGE UP
CULTURE RESULTS: SIGNIFICANT CHANGE UP
SPECIMEN SOURCE: SIGNIFICANT CHANGE UP
SPECIMEN SOURCE: SIGNIFICANT CHANGE UP

## 2020-05-19 DIAGNOSIS — M54.9 DORSALGIA, UNSPECIFIED: ICD-10-CM

## 2020-05-19 DIAGNOSIS — E87.5 HYPERKALEMIA: ICD-10-CM

## 2020-05-19 DIAGNOSIS — Z79.4 LONG TERM (CURRENT) USE OF INSULIN: ICD-10-CM

## 2020-05-19 DIAGNOSIS — G89.29 OTHER CHRONIC PAIN: ICD-10-CM

## 2020-05-19 DIAGNOSIS — N71.9 INFLAMMATORY DISEASE OF UTERUS, UNSPECIFIED: ICD-10-CM

## 2020-05-19 DIAGNOSIS — F17.210 NICOTINE DEPENDENCE, CIGARETTES, UNCOMPLICATED: ICD-10-CM

## 2020-05-19 DIAGNOSIS — D62 ACUTE POSTHEMORRHAGIC ANEMIA: ICD-10-CM

## 2020-05-19 DIAGNOSIS — Z79.52 LONG TERM (CURRENT) USE OF SYSTEMIC STEROIDS: ICD-10-CM

## 2020-05-19 DIAGNOSIS — F05 DELIRIUM DUE TO KNOWN PHYSIOLOGICAL CONDITION: ICD-10-CM

## 2020-05-19 DIAGNOSIS — R53.2 FUNCTIONAL QUADRIPLEGIA: ICD-10-CM

## 2020-05-19 DIAGNOSIS — K92.2 GASTROINTESTINAL HEMORRHAGE, UNSPECIFIED: ICD-10-CM

## 2020-05-19 DIAGNOSIS — N30.80 OTHER CYSTITIS WITHOUT HEMATURIA: ICD-10-CM

## 2020-05-19 DIAGNOSIS — I95.9 HYPOTENSION, UNSPECIFIED: ICD-10-CM

## 2020-05-19 DIAGNOSIS — D72.829 ELEVATED WHITE BLOOD CELL COUNT, UNSPECIFIED: ICD-10-CM

## 2020-05-19 DIAGNOSIS — E66.01 MORBID (SEVERE) OBESITY DUE TO EXCESS CALORIES: ICD-10-CM

## 2020-05-19 DIAGNOSIS — K52.9 NONINFECTIVE GASTROENTERITIS AND COLITIS, UNSPECIFIED: ICD-10-CM

## 2020-05-19 DIAGNOSIS — M06.9 RHEUMATOID ARTHRITIS, UNSPECIFIED: ICD-10-CM

## 2020-05-19 DIAGNOSIS — I10 ESSENTIAL (PRIMARY) HYPERTENSION: ICD-10-CM

## 2020-05-19 DIAGNOSIS — G47.33 OBSTRUCTIVE SLEEP APNEA (ADULT) (PEDIATRIC): ICD-10-CM

## 2020-05-19 DIAGNOSIS — E03.9 HYPOTHYROIDISM, UNSPECIFIED: ICD-10-CM

## 2020-05-19 DIAGNOSIS — Z11.59 ENCOUNTER FOR SCREENING FOR OTHER VIRAL DISEASES: ICD-10-CM

## 2020-05-19 DIAGNOSIS — N17.9 ACUTE KIDNEY FAILURE, UNSPECIFIED: ICD-10-CM

## 2020-05-19 DIAGNOSIS — E11.65 TYPE 2 DIABETES MELLITUS WITH HYPERGLYCEMIA: ICD-10-CM

## 2020-05-19 DIAGNOSIS — J44.9 CHRONIC OBSTRUCTIVE PULMONARY DISEASE, UNSPECIFIED: ICD-10-CM

## 2020-05-19 DIAGNOSIS — Z79.51 LONG TERM (CURRENT) USE OF INHALED STEROIDS: ICD-10-CM

## 2020-05-29 ENCOUNTER — EMERGENCY (EMERGENCY)
Facility: HOSPITAL | Age: 65
LOS: 0 days | Discharge: ANOTHER HOSPITAL | End: 2020-05-29
Attending: EMERGENCY MEDICINE
Payer: COMMERCIAL

## 2020-05-29 ENCOUNTER — INPATIENT (INPATIENT)
Facility: HOSPITAL | Age: 65
LOS: 46 days | DRG: 64 | End: 2020-07-15
Attending: INTERNAL MEDICINE | Admitting: STUDENT IN AN ORGANIZED HEALTH CARE EDUCATION/TRAINING PROGRAM
Payer: COMMERCIAL

## 2020-05-29 VITALS
HEART RATE: 82 BPM | OXYGEN SATURATION: 99 % | RESPIRATION RATE: 14 BRPM | DIASTOLIC BLOOD PRESSURE: 68 MMHG | SYSTOLIC BLOOD PRESSURE: 109 MMHG

## 2020-05-29 VITALS
HEART RATE: 62 BPM | RESPIRATION RATE: 22 BRPM | DIASTOLIC BLOOD PRESSURE: 94 MMHG | OXYGEN SATURATION: 99 % | TEMPERATURE: 98 F | SYSTOLIC BLOOD PRESSURE: 120 MMHG

## 2020-05-29 VITALS
HEART RATE: 78 BPM | DIASTOLIC BLOOD PRESSURE: 87 MMHG | RESPIRATION RATE: 17 BRPM | OXYGEN SATURATION: 98 % | WEIGHT: 293 LBS | TEMPERATURE: 97 F | HEIGHT: 60 IN | SYSTOLIC BLOOD PRESSURE: 148 MMHG

## 2020-05-29 DIAGNOSIS — Z88.8 ALLERGY STATUS TO OTHER DRUGS, MEDICAMENTS AND BIOLOGICAL SUBSTANCES STATUS: ICD-10-CM

## 2020-05-29 DIAGNOSIS — G47.33 OBSTRUCTIVE SLEEP APNEA (ADULT) (PEDIATRIC): ICD-10-CM

## 2020-05-29 DIAGNOSIS — Z87.891 PERSONAL HISTORY OF NICOTINE DEPENDENCE: ICD-10-CM

## 2020-05-29 DIAGNOSIS — I61.5 NONTRAUMATIC INTRACEREBRAL HEMORRHAGE, INTRAVENTRICULAR: ICD-10-CM

## 2020-05-29 DIAGNOSIS — Z99.81 DEPENDENCE ON SUPPLEMENTAL OXYGEN: ICD-10-CM

## 2020-05-29 DIAGNOSIS — R41.0 DISORIENTATION, UNSPECIFIED: ICD-10-CM

## 2020-05-29 DIAGNOSIS — G40.909 EPILEPSY, UNSPECIFIED, NOT INTRACTABLE, WITHOUT STATUS EPILEPTICUS: ICD-10-CM

## 2020-05-29 DIAGNOSIS — I61.9 NONTRAUMATIC INTRACEREBRAL HEMORRHAGE, UNSPECIFIED: ICD-10-CM

## 2020-05-29 DIAGNOSIS — E03.9 HYPOTHYROIDISM, UNSPECIFIED: ICD-10-CM

## 2020-05-29 DIAGNOSIS — R41.82 ALTERED MENTAL STATUS, UNSPECIFIED: ICD-10-CM

## 2020-05-29 DIAGNOSIS — R06.03 ACUTE RESPIRATORY DISTRESS: ICD-10-CM

## 2020-05-29 DIAGNOSIS — E11.9 TYPE 2 DIABETES MELLITUS WITHOUT COMPLICATIONS: ICD-10-CM

## 2020-05-29 DIAGNOSIS — J44.9 CHRONIC OBSTRUCTIVE PULMONARY DISEASE, UNSPECIFIED: ICD-10-CM

## 2020-05-29 LAB
ALBUMIN SERPL ELPH-MCNC: 2.7 G/DL — LOW (ref 3.3–5)
ALP SERPL-CCNC: 103 U/L — SIGNIFICANT CHANGE UP (ref 40–120)
ALT FLD-CCNC: 39 U/L — SIGNIFICANT CHANGE UP (ref 12–78)
AMMONIA BLD-MCNC: 32 UMOL/L — SIGNIFICANT CHANGE UP (ref 11–32)
ANION GAP SERPL CALC-SCNC: 11 MMOL/L — SIGNIFICANT CHANGE UP (ref 5–17)
APAP SERPL-MCNC: < 2 UG/ML (ref 10–30)
APTT BLD: 17.6 SEC — LOW (ref 27.5–36.3)
AST SERPL-CCNC: 34 U/L — SIGNIFICANT CHANGE UP (ref 15–37)
BASE EXCESS BLDA CALC-SCNC: -0.3 MMOL/L — SIGNIFICANT CHANGE UP (ref -2–2)
BASOPHILS # BLD AUTO: 0.06 K/UL — SIGNIFICANT CHANGE UP (ref 0–0.2)
BASOPHILS NFR BLD AUTO: 0.3 % — SIGNIFICANT CHANGE UP (ref 0–2)
BILIRUB SERPL-MCNC: 0.3 MG/DL — SIGNIFICANT CHANGE UP (ref 0.2–1.2)
BLD GP AB SCN SERPL QL: SIGNIFICANT CHANGE UP
BLOOD GAS COMMENTS: SIGNIFICANT CHANGE UP
BLOOD GAS SOURCE: SIGNIFICANT CHANGE UP
BUN SERPL-MCNC: 26 MG/DL — HIGH (ref 7–23)
CALCIUM SERPL-MCNC: 9.1 MG/DL — SIGNIFICANT CHANGE UP (ref 8.5–10.1)
CHLORIDE SERPL-SCNC: 108 MMOL/L — SIGNIFICANT CHANGE UP (ref 96–108)
CK MB CFR SERPL CALC: <1 NG/ML — SIGNIFICANT CHANGE UP (ref 0.5–3.6)
CO2 SERPL-SCNC: 23 MMOL/L — SIGNIFICANT CHANGE UP (ref 22–31)
CREAT SERPL-MCNC: 1.08 MG/DL — SIGNIFICANT CHANGE UP (ref 0.5–1.3)
EOSINOPHIL # BLD AUTO: 0.06 K/UL — SIGNIFICANT CHANGE UP (ref 0–0.5)
EOSINOPHIL NFR BLD AUTO: 0.3 % — SIGNIFICANT CHANGE UP (ref 0–6)
ETHANOL SERPL-MCNC: <10 MG/DL — SIGNIFICANT CHANGE UP (ref 0–10)
GLUCOSE BLDC GLUCOMTR-MCNC: 239 MG/DL — HIGH (ref 70–99)
GLUCOSE SERPL-MCNC: 244 MG/DL — HIGH (ref 70–99)
HCG SERPL-ACNC: <1 MIU/ML — SIGNIFICANT CHANGE UP
HCO3 BLDA-SCNC: 25 MMOL/L — SIGNIFICANT CHANGE UP (ref 21–29)
HCT VFR BLD CALC: 39.3 % — SIGNIFICANT CHANGE UP (ref 34.5–45)
HGB BLD-MCNC: 12.2 G/DL — SIGNIFICANT CHANGE UP (ref 11.5–15.5)
HOROWITZ INDEX BLDA+IHG-RTO: 50 — SIGNIFICANT CHANGE UP
IMM GRANULOCYTES NFR BLD AUTO: 1.2 % — SIGNIFICANT CHANGE UP (ref 0–1.5)
INR BLD: 1.02 RATIO — SIGNIFICANT CHANGE UP (ref 0.88–1.16)
LACTATE SERPL-SCNC: 3.3 MMOL/L — HIGH (ref 0.7–2)
LYMPHOCYTES # BLD AUTO: 16.6 % — SIGNIFICANT CHANGE UP (ref 13–44)
LYMPHOCYTES # BLD AUTO: 3.23 K/UL — SIGNIFICANT CHANGE UP (ref 1–3.3)
MCHC RBC-ENTMCNC: 28.2 PG — SIGNIFICANT CHANGE UP (ref 27–34)
MCHC RBC-ENTMCNC: 31 GM/DL — LOW (ref 32–36)
MCV RBC AUTO: 90.8 FL — SIGNIFICANT CHANGE UP (ref 80–100)
MONOCYTES # BLD AUTO: 1.49 K/UL — HIGH (ref 0–0.9)
MONOCYTES NFR BLD AUTO: 7.7 % — SIGNIFICANT CHANGE UP (ref 2–14)
NEUTROPHILS # BLD AUTO: 14.32 K/UL — HIGH (ref 1.8–7.4)
NEUTROPHILS NFR BLD AUTO: 73.9 % — SIGNIFICANT CHANGE UP (ref 43–77)
NRBC # BLD: 0 /100 WBCS — SIGNIFICANT CHANGE UP (ref 0–0)
PCO2 BLDA: 43 MMHG — SIGNIFICANT CHANGE UP (ref 32–46)
PH BLD: 7.38 — SIGNIFICANT CHANGE UP (ref 7.35–7.45)
PLATELET # BLD AUTO: 646 K/UL — HIGH (ref 150–400)
PO2 BLDA: 141 MMHG — HIGH (ref 74–108)
POTASSIUM SERPL-MCNC: 4.5 MMOL/L — SIGNIFICANT CHANGE UP (ref 3.5–5.3)
POTASSIUM SERPL-SCNC: 4.5 MMOL/L — SIGNIFICANT CHANGE UP (ref 3.5–5.3)
PROT SERPL-MCNC: 7.1 GM/DL — SIGNIFICANT CHANGE UP (ref 6–8.3)
PROTHROM AB SERPL-ACNC: 11.4 SEC — SIGNIFICANT CHANGE UP (ref 10–12.9)
RBC # BLD: 4.33 M/UL — SIGNIFICANT CHANGE UP (ref 3.8–5.2)
RBC # FLD: 25.5 % — HIGH (ref 10.3–14.5)
SALICYLATES SERPL-MCNC: <1.7 MG/DL — LOW (ref 2.8–20)
SAO2 % BLDA: 99 % — HIGH (ref 92–96)
SODIUM SERPL-SCNC: 142 MMOL/L — SIGNIFICANT CHANGE UP (ref 135–145)
TROPONIN I SERPL-MCNC: <.015 NG/ML — SIGNIFICANT CHANGE UP (ref 0.01–0.04)
TSH SERPL-MCNC: 11.4 UIU/ML — HIGH (ref 0.36–3.74)
WBC # BLD: 19.4 K/UL — HIGH (ref 3.8–10.5)
WBC # FLD AUTO: 19.4 K/UL — HIGH (ref 3.8–10.5)

## 2020-05-29 PROCEDURE — 74176 CT ABD & PELVIS W/O CONTRAST: CPT | Mod: 26

## 2020-05-29 PROCEDURE — 99291 CRITICAL CARE FIRST HOUR: CPT | Mod: 25

## 2020-05-29 PROCEDURE — 36556 INSERT NON-TUNNEL CV CATH: CPT

## 2020-05-29 PROCEDURE — 99231 SBSQ HOSP IP/OBS SF/LOW 25: CPT

## 2020-05-29 PROCEDURE — 93010 ELECTROCARDIOGRAM REPORT: CPT

## 2020-05-29 PROCEDURE — 31500 INSERT EMERGENCY AIRWAY: CPT

## 2020-05-29 PROCEDURE — 70450 CT HEAD/BRAIN W/O DYE: CPT | Mod: 26

## 2020-05-29 PROCEDURE — 99291 CRITICAL CARE FIRST HOUR: CPT

## 2020-05-29 PROCEDURE — 71045 X-RAY EXAM CHEST 1 VIEW: CPT | Mod: 26

## 2020-05-29 RX ORDER — SENNA PLUS 8.6 MG/1
2 TABLET ORAL AT BEDTIME
Refills: 0 | Status: DISCONTINUED | OUTPATIENT
Start: 2020-05-29 | End: 2020-06-04

## 2020-05-29 RX ORDER — ROCURONIUM BROMIDE 10 MG/ML
100 VIAL (ML) INTRAVENOUS ONCE
Refills: 0 | Status: COMPLETED | OUTPATIENT
Start: 2020-05-29 | End: 2020-05-29

## 2020-05-29 RX ORDER — CHLORHEXIDINE GLUCONATE 213 G/1000ML
1 SOLUTION TOPICAL
Refills: 0 | Status: DISCONTINUED | OUTPATIENT
Start: 2020-05-29 | End: 2020-07-11

## 2020-05-29 RX ORDER — CHLORHEXIDINE GLUCONATE 213 G/1000ML
15 SOLUTION TOPICAL EVERY 12 HOURS
Refills: 0 | Status: DISCONTINUED | OUTPATIENT
Start: 2020-05-29 | End: 2020-05-29

## 2020-05-29 RX ORDER — VANCOMYCIN HCL 1 G
1000 VIAL (EA) INTRAVENOUS ONCE
Refills: 0 | Status: DISCONTINUED | OUTPATIENT
Start: 2020-05-29 | End: 2020-05-29

## 2020-05-29 RX ORDER — SODIUM CHLORIDE 9 MG/ML
1000 INJECTION INTRAMUSCULAR; INTRAVENOUS; SUBCUTANEOUS ONCE
Refills: 0 | Status: COMPLETED | OUTPATIENT
Start: 2020-05-29 | End: 2020-05-29

## 2020-05-29 RX ORDER — POLYETHYLENE GLYCOL 3350 17 G/17G
17 POWDER, FOR SOLUTION ORAL EVERY 12 HOURS
Refills: 0 | Status: DISCONTINUED | OUTPATIENT
Start: 2020-05-29 | End: 2020-06-04

## 2020-05-29 RX ORDER — LEVETIRACETAM 250 MG/1
1000 TABLET, FILM COATED ORAL ONCE
Refills: 0 | Status: COMPLETED | OUTPATIENT
Start: 2020-05-29 | End: 2020-05-29

## 2020-05-29 RX ORDER — MIDAZOLAM HYDROCHLORIDE 1 MG/ML
0.02 INJECTION, SOLUTION INTRAMUSCULAR; INTRAVENOUS
Qty: 100 | Refills: 0 | Status: DISCONTINUED | OUTPATIENT
Start: 2020-05-29 | End: 2020-05-29

## 2020-05-29 RX ORDER — SODIUM CHLORIDE 5 G/100ML
1000 INJECTION, SOLUTION INTRAVENOUS
Refills: 0 | Status: DISCONTINUED | OUTPATIENT
Start: 2020-05-29 | End: 2020-05-30

## 2020-05-29 RX ORDER — ETOMIDATE 2 MG/ML
41 INJECTION INTRAVENOUS ONCE
Refills: 0 | Status: COMPLETED | OUTPATIENT
Start: 2020-05-29 | End: 2020-05-29

## 2020-05-29 RX ORDER — ACETAMINOPHEN 500 MG
650 TABLET ORAL EVERY 6 HOURS
Refills: 0 | Status: DISCONTINUED | OUTPATIENT
Start: 2020-05-29 | End: 2020-07-13

## 2020-05-29 RX ORDER — PIPERACILLIN AND TAZOBACTAM 4; .5 G/20ML; G/20ML
3.38 INJECTION, POWDER, LYOPHILIZED, FOR SOLUTION INTRAVENOUS ONCE
Refills: 0 | Status: COMPLETED | OUTPATIENT
Start: 2020-05-29 | End: 2020-05-29

## 2020-05-29 RX ORDER — CHLORHEXIDINE GLUCONATE 213 G/1000ML
15 SOLUTION TOPICAL EVERY 12 HOURS
Refills: 0 | Status: DISCONTINUED | OUTPATIENT
Start: 2020-05-29 | End: 2020-07-11

## 2020-05-29 RX ADMIN — SODIUM CHLORIDE 1000 MILLILITER(S): 9 INJECTION INTRAMUSCULAR; INTRAVENOUS; SUBCUTANEOUS at 21:00

## 2020-05-29 RX ADMIN — PIPERACILLIN AND TAZOBACTAM 200 GRAM(S): 4; .5 INJECTION, POWDER, LYOPHILIZED, FOR SOLUTION INTRAVENOUS at 22:16

## 2020-05-29 RX ADMIN — Medication 100 MILLIGRAM(S): at 20:38

## 2020-05-29 RX ADMIN — MIDAZOLAM HYDROCHLORIDE 2.72 MG/KG/HR: 1 INJECTION, SOLUTION INTRAMUSCULAR; INTRAVENOUS at 20:59

## 2020-05-29 RX ADMIN — ETOMIDATE 41 MILLIGRAM(S): 2 INJECTION INTRAVENOUS at 20:37

## 2020-05-29 RX ADMIN — LEVETIRACETAM 400 MILLIGRAM(S): 250 TABLET, FILM COATED ORAL at 23:01

## 2020-05-29 NOTE — ED PROVIDER NOTE - CLINICAL SUMMARY MEDICAL DECISION MAKING FREE TEXT BOX
63 yo F with new onset seizures, likely 2/2 chronic bleed, possible electrolyte disturbance, hypothyroid, hypoglycemia, doubt sepsis, acs, intox/withdrawal, overdose  -basic labs, coags, trop, ckmb, etoh, ammonia, lactate, covid swab, blodo cx, ua, cx, drug screen, hcg, finger stick stat, asa/tylenol levels, type and screen, CT brain stat, EKG, iv, ns hydration bolus, sz precautions, rectal temp  -f/u results, reeval

## 2020-05-29 NOTE — ED PROVIDER NOTE - OBJECTIVE STATEMENT
65 yo F with AMS since this morning.  Pt. was confused, didn't recognize son this morning.  He notes she was sleeping more than usual yesterday, which tipped off son that something was wrong.  At 11 am pt. was gasping for air like she was seizing. 2 hours ago pt. was dry heaving, her chest was going up and down and she started shaking like she was having a seizure.  Pt. has no history of seizures per son.  ROS: negative for fever, cough, headache, chest pain, shortness of breath, abd pain, nausea, vomiting, diarrhea, rash, paresthesia, and weakness--all other systems reviewed are negative.   PMH: hypothyroidism, HTN, IDDM, COPD/CHRIS on home O2, RA (on Prednisone), SBO s/p ex lap with lysis of adhesions c/b evisceration,  recent hospitalization in 3/2020 with encephalopathy, significantly hypothyroid (, without myxedema coma), small (stable) right sided SDH, UTI, catatonic reaction to VPA (now resolved) and DKA and psychiatric issues, 5/2020 hospitalization for GIB, Emphysematous cystitis, endometritis, Sigmoid colitis, morbid obesity, functional quadriplegia; Meds: See EMR for list; SH: Denies smoking/drinking/drug use  Norris Rush, son [phone] 63 yo F with AMS since this morning.  Pt. was confused, didn't recognize son this morning.  He notes she was sleeping more than usual yesterday, which tipped off son that something was wrong.  At 11 am pt. was gasping for air like she was seizing. 2 hours ago pt. was dry heaving, her chest was going up and down and she started shaking like she was having a seizure.  Pt. has no history of seizures per son.  ROS: negative for fever, cough, headache, chest pain, abd pain, nausea, vomiting, diarrhea, rash, paresthesia, and focal weakness--all other systems reviewed are negative.   PMH: hypothyroidism, HTN, IDDM, COPD/CHRIS on home O2, RA (on Prednisone), SBO s/p ex lap with lysis of adhesions c/b evisceration,  recent hospitalization in 3/2020 with encephalopathy, significantly hypothyroid (, without myxedema coma), small (stable) right sided SDH, UTI, catatonic reaction to VPA (now resolved) and DKA and psychiatric issues, 5/2020 hospitalization for GIB, Emphysematous cystitis, endometritis, Sigmoid colitis, morbid obesity, functional quadriplegia; Meds: See EMR for list; SH: Denies smoking/drinking/drug use  Norris Rush, son [phone] 65 yo F with AMS since this morning.  Pt. was confused, didn't recognize son this morning.  He notes she was sleeping more than usual yesterday, which tipped off son that something was wrong.  At 11 am pt. was gasping for air like she was seizing. 2 hours ago pt. was dry heaving, her chest was going up and down and she started shaking like she was having a seizure.  Pt. has no history of seizures per son.  ROS: negative for fever, cough, headache, chest pain, abd pain, nausea, vomiting, diarrhea, rash, paresthesia, and focal weakness--all other systems reviewed are negative.   PMH: hypothyroidism, HTN, IDDM, COPD/CHRIS on home O2, RA (on Prednisone), SBO s/p ex lap with lysis of adhesions c/b evisceration,  recent hospitalization in 3/2020 with encephalopathy, significantly hypothyroid (, without myxedema coma), small (stable) right sided SDH, UTI, catatonic reaction to VPA (now resolved) and DKA and psychiatric issues, 5/2020 hospitalization for GIB, Emphysematous cystitis, endometritis, Sigmoid colitis, morbid obesity, functional quadriplegia; Meds: See EMR for list; SH: Denies smoking/drinking/drug use  HCP: Norris Rush, Son [phone]; Daughter, Heavenly Schmidt; son asks that only him or his sister, Heavenly be contacted for patient updates/information as they are both proxy's for patient 63 yo F with AMS since this morning.  Pt. was confused, didn't recognize son this morning.  He notes she was sleeping more than usual yesterday, which tipped off son that something was wrong.  At 11 am pt. was gasping for air like she was seizing. 2 hours ago pt. was dry heaving, her chest was going up and down and she started shaking like she was having a seizure.  Pt. has no history of seizures per son.  ROS: negative for fever, cough, headache, chest pain, abd pain, nausea, vomiting, diarrhea, rash, paresthesia, and focal weakness--all other systems reviewed are negative.   PMH: hypothyroidism, HTN, IDDM, COPD/CHRIS on home O2, RA (on Prednisone), SBO s/p ex lap with lysis of adhesions c/b evisceration,  recent hospitalization in 3/2020 with encephalopathy, significantly hypothyroid (, without myxedema coma), small (stable) right sided SDH, UTI, catatonic reaction to VPA (now resolved) and DKA and psychiatric issues, 5/2020 hospitalization for GIB, Emphysematous cystitis, endometritis, Sigmoid colitis, morbid obesity, functional quadriplegia; Meds: See EMR for list; SH: Denies smoking/drinking/drug use  HCP: Norris Rush, Son [phone]; Daughter, Heavenly Schmidt [phone]; son asks that only him or his sister, Heavenly be contacted for patient updates/information as they are both proxy's for patient

## 2020-05-29 NOTE — H&P ADULT - HISTORY OF PRESENT ILLNESS
65 yo F with AMS since this morning.  Pt. was confused, didn't recognize son this morning.  He notes she was sleeping more than usual yesterday, which tipped off son that something was wrong.  At 11 am pt. was gasping for air like she was seizing. 2 hours ago pt. was dry heaving, her chest was going up and down and she started shaking like she was having a seizure.  Pt. has no history of seizures per son.  		PMH: hypothyroidism, HTN, IDDM, COPD/CHRIS on home O2, RA (on Prednisone), SBO s/p ex lap with lysis of adhesions c/b evisceration,  recent hospitalization in 3/2020 with encephalopathy, significantly hypothyroid (, without myxedema coma), small (stable) right sided SDH, UTI, catatonic reaction to VPA (now resolved) and DKA and psychiatric issues, 5/2020 hospitalization for GIB, Emphysematous cystitis, endometritis, Sigmoid colitis, morbid obesity, functional quadriplegia; Meds: See EMR for list; SH: Denies smoking/drinking/drug use  HCP: Norris Rush, Son [phone]; Daughter, Heavenly Schmidt [phone]; son asks that only him or his sister, Heavenly be contacted for patient updates/information as they are both proxy's for patient 65 yo F with AMS since this morning.  Pt. was confused, didn't recognize son this morning.  He notes she was sleeping more than usual yesterday, which tipped off son that something was wrong.  At 11 am pt. was gasping for air like she was seizing. 2 hours ago pt. was dry heaving, her chest was going up and down and she started shaking like she was having a seizure.  Pt. has no history of seizures per son.  		PMH: hypothyroidism, HTN, IDDM, COPD/CHRIS on home O2, RA (on Prednisone), SBO s/p ex lap with lysis of adhesions c/b evisceration,  recent hospitalization in 3/2020 with encephalopathy, significantly hypothyroid (, without myxedema coma), small (stable) right sided SDH, UTI, catatonic reaction to VPA (now resolved) and DKA and psychiatric issues, 5/2020 hospitalization for GIB, Emphysematous cystitis, endometritis, Sigmoid colitis, morbid obesity, functional quadriplegia    HCP: Norris Rush, Son [phone]; Daughter, Heavenly Schmidt [phone]; son asks that only him or his sister, Heavenly be contacted for patient updates/information as they are both proxy's for patient

## 2020-05-29 NOTE — ED PROCEDURE NOTE - CPROC ED POST PROC CARE GUIDE1
Keep the cast/splint/dressing clean and dry./Verbal/written post procedure instructions were given to patient/caregiver./Instructed patient/caregiver to follow-up with primary care physician./Instructed patient/caregiver regarding signs and symptoms of infection.
Verbal/written post procedure instructions were given to patient/caregiver.

## 2020-05-29 NOTE — ED PROVIDER NOTE - CARE PLAN
Principal Discharge DX:	New onset seizure Principal Discharge DX:	New onset seizure  Secondary Diagnosis:	Respiratory distress Principal Discharge DX:	Nontraumatic intraventricular intracerebral hemorrhage, unspecified laterality  Secondary Diagnosis:	Respiratory distress  Secondary Diagnosis:	New onset seizure

## 2020-05-29 NOTE — H&P ADULT - NSHPPHYSICALEXAM_GEN_ALL_CORE
PHYSICAL EXAM:    Constitutional: No Acute Distress     Neurological: Awake, alert, oriented to person, place and time, speech clear and fluent, face equal, tongue midline, briskly following commands, no drift, moves all extremities with 5/5 strength, sensation intact to light touch throughout, pupils 4mm and reactive bilaterally, extraocular movements intact, no nystagmus    Pulmonary: Clear to Auscultation, No rales, No rhonchi, No wheezes     Cardiovascular: S1, S2, Regular rate and rhythm     Gastrointestinal: Soft, Non-tender, Non-distended, +bowel sounds x 4    Extremities: No calf tenderness bilaterally, no cyanosis, clubbing or edema PHYSICAL EXAM:  Neurological: intubated, EO to noxious stimuli  Pulmonary: Clear to Auscultation, No rales, No rhonchi, No wheezes   Cardiovascular: S1, S2, Regular rate and rhythm   Gastrointestinal: Soft, Non-tender, Non-distended, +bowel sounds x 4, diffuse abdominal scarring  Extremities: No calf tenderness bilaterally, no cyanosis, clubbing or edema PHYSICAL EXAM:  Neurological: intubated, EO to noxious stimuli, no FC, BOWER  Pulmonary: Clear to Auscultation, No rales, No rhonchi, No wheezes   Cardiovascular: S1, S2, Regular rate and rhythm   Gastrointestinal: Soft, Non-tender, Non-distended, +bowel sounds x 4, diffuse abdominal scarring  Extremities: No calf tenderness bilaterally, no cyanosis, clubbing or edema PHYSICAL EXAM:  Neurological: intubated, EO to noxious stimuli, pupils 3mm R, + corneals, + cough/gag, overbreathing, no FC, nothing x4  Pulmonary: Clear to Auscultation, No rales, No rhonchi, No wheezes   Cardiovascular: S1, S2, Regular rate and rhythm   Gastrointestinal: Soft, Non-tender, Non-distended, +bowel sounds x 4, diffuse abdominal scarring  Extremities: No calf tenderness bilaterally, no cyanosis, clubbing or edema PHYSICAL EXAM:  Neurological: intubated, EO to noxious stimuli, pupils 3mm R, + corneals, + cough/gag, overbreathing, no FC, UE nothing, LE TF  Pulmonary: Clear to Auscultation, No rales, No rhonchi, No wheezes   Cardiovascular: S1, S2, Regular rate and rhythm   Gastrointestinal: Soft, Non-tender, Non-distended, +bowel sounds x 4, diffuse abdominal scarring  Extremities: No calf tenderness bilaterally, no cyanosis, clubbing or edema

## 2020-05-29 NOTE — ED ADULT NURSE NOTE - NSFALLRSKINDICATORS_ED_ALL_ED
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

## 2020-05-29 NOTE — H&P ADULT - ASSESSMENT
65 yo F with AMS since this morning.  Pt. was confused, didn't recognize son this morning.  He notes she was sleeping more than usual yesterday, which tipped off son that something was wrong.  At 11 am pt. was gasping for air like she was seizing. 2 hours ago pt. was dry heaving, her chest was going up and down and she started shaking like she was having a seizure.  Pt. has no history of seizures per son. CT head at OSH shows 5.5cm left frontal ICH, SAH and IVH.     PLAN:   Neuro:  -Q1 neuro checks  -CTA H/N now       Pulm:  -intubated:     CV:    GI:    Renal:    Heme/Onc:    Endo:    ID: 63 yo F with AMS since this morning.  Pt. was confused, didn't recognize son this morning.  He notes she was sleeping more than usual yesterday, which tipped off son that something was wrong.  At 11 am pt. was gasping for air like she was seizing. 2 hours ago pt. was dry heaving, her chest was going up and down and she started shaking like she was having a seizure.  Pt. has no history of seizures per son. CT head at OSH shows 5.5cm left frontal ICH, SAH and IVH.     PLAN:   Neuro:  -Q1 neuro checks  -CTA H/N now       Pulm:  -intubated:     CV:    GI:    Renal:    Heme/Onc:    Endo:    ID:    CODE STATUS:  [x] Full Code [] DNR [] DNI [] Palliative/Comfort Care    DISPOSITION:  [x] ICU [] Stroke Unit [] Floor [] EMU [] RCU [] PCU    [x] Patient is at high risk of neurologic deterioration/death due to: ICH, brain herniation    Time spent:  45 [x] critical care minutes 63 yo F with AMS since this morning.  Pt. was confused, didn't recognize son this morning.  He notes she was sleeping more than usual yesterday, which tipped off son that something was wrong.  At 11 am pt. was gasping for air like she was seizing. 2 hours ago pt. was dry heaving, her chest was going up and down and she started shaking like she was having a seizure.  Pt. has no history of seizures per son. CT head at OSH shows 5.5cm left frontal ICH, SAH and IVH.     PLAN:   Neuro:  -Q1 neuro checks  -CTA H/N now   -CT head in 6 hours for stability scan  -neurosurgery consult  -Sedation with precedex if needed  - tylenol prn for pain control  - activity: bedrest  -PT/OT/S&S ordered      Pulm:  -intubated: PRVC, TV: 450, RR 14, FiO2 50, PEEP 5  -Peridex  -Will repeat CXR to confirm ETT placement    CV:  --140  -TTE w/ bubble study for stroke workup  -continue home dose zocor    GI:  -NPO   -GI ppx: protonix  -Bowel regimen: senna, miralax  -CT A/P done at OSH- see results above    Renal:  -Na goal 140-150  - 2% @ 50  -BMP q6 hrs    Heme/Onc:  -Hold chemo ppx due to ICH  -SCDS for dvt ppx  -LED dopplers to r/o dvt on admission, high risk of dvt due to immobility at baseline  -will send stroke workup labs: HA1c, lipid panel, thyroid panel    Endo:  - HISS for hyperglycemia  - continue synthroid for hypothyroidism    ID:  -monitor for fevers    CODE STATUS:  [x] Full Code [] DNR [] DNI [] Palliative/Comfort Care    DISPOSITION:  [x] ICU [] Stroke Unit [] Floor [] EMU [] RCU [] PCU    [x] Patient is at high risk of neurologic deterioration/death due to: ICH, brain herniation    Time spent:  45 [x] critical care minutes 65 yo F with AMS since this morning.  Pt. was confused, didn't recognize son this morning.  He notes she was sleeping more than usual yesterday, which tipped off son that something was wrong.  At 11 am pt. was gasping for air like she was seizing. 2 hours ago pt. was dry heaving, her chest was going up and down and she started shaking like she was having a seizure.  Pt. has no history of seizures per son. CT head at OSH shows 5.5cm left frontal ICH, SAH and IVH.     PLAN:   Neuro:  -Q1 neuro checks  -CTA H/N now   -CT head in 6 hours for stability scan  -neurosurgery consult  -Sedation with precedex if needed  - tylenol prn for pain control  - activity: bedrest  -PT/OT/S&S ordered      Pulm:  -intubated: PRVC, TV: 450, RR 14, FiO2 50, PEEP 5  -Peridex  -Will repeat CXR to confirm ETT placement    CV:  --140  -TTE w/ bubble study for stroke workup  -continue home dose zocor    GI:  -NPO   -GI ppx: protonix  -Bowel regimen: senna, miralax  -CT A/P done at OSH- see results above    Renal:  -Na goal 140-150  - 2% @ 50  -BMP q6 hrs    Heme/Onc:  -Hold chemo ppx due to ICH  -SCDS for dvt ppx  -LED dopplers to r/o dvt on admission, high risk of dvt due to immobility at baseline  -will send stroke workup labs: HA1c, lipid panel, thyroid panel    Endo:  - HISS for hyperglycemia  - continue synthroid for hypothyroidism  -continue prednisone 5 QD for RA    ID:  -monitor for fevers    CODE STATUS:  [x] Full Code [] DNR [] DNI [] Palliative/Comfort Care    DISPOSITION:  [x] ICU [] Stroke Unit [] Floor [] EMU [] RCU [] PCU    [x] Patient is at high risk of neurologic deterioration/death due to: ICH, brain herniation    Time spent:  45 [x] critical care minutes 65 yo F with AMS since this morning.  Pt. was confused, didn't recognize son this morning.  He notes she was sleeping more than usual yesterday, which tipped off son that something was wrong.  At 11 am pt. was gasping for air like she was seizing. 2 hours ago pt. was dry heaving, her chest was going up and down and she started shaking like she was having a seizure.  Pt. has no history of seizures per son. CT head at OSH shows 5.5cm left frontal ICH, SAH and IVH.     PLAN:   Neuro:  -Q1 neuro checks  -CTA H/N now   -CT head in 6 hours for stability scan  -neurosurgery consult  -Sedation with precedex if needed  - tylenol prn for pain control  - activity: bedrest  -PT/OT/S&S ordered      Pulm:  -intubated: PRVC, TV: 450, RR 14, FiO2 50, PEEP 5  -Peridex  -Will repeat CXR to confirm ETT placement    CV:  --140  -TTE w/ bubble study for stroke workup  -continue home dose zocor    GI:  -NPO   -GI ppx: protonix  -Bowel regimen: senna, miralax  -CT A/P done at OSH- see results above    Renal:  -Na goal 140-150  - 2% @ 50  -BMP q6 hrs    Heme/Onc:  -Hold chemo ppx due to ICH  -SCDS for dvt ppx  -LED dopplers to r/o dvt on admission, high risk of dvt due to immobility at baseline  -will send stroke workup labs: HA1c, lipid panel, thyroid panel  -was on ASA 81 at home - will give DDAVP and platelets    Endo:  - HISS for hyperglycemia  - continue synthroid for hypothyroidism  -continue prednisone 5 QD for RA    ID:  -monitor for fevers    CODE STATUS:  [x] Full Code [] DNR [] DNI [] Palliative/Comfort Care    DISPOSITION:  [x] ICU [] Stroke Unit [] Floor [] EMU [] RCU [] PCU    [x] Patient is at high risk of neurologic deterioration/death due to: ICH, brain herniation    Time spent:  45 [x] critical care minutes 65 yo F with AMS since this morning.  Pt. was confused, didn't recognize son this morning.  He notes she was sleeping more than usual yesterday, which tipped off son that something was wrong.  At 11 am pt. was gasping for air like she was seizing. 2 hours ago pt. was dry heaving, her chest was going up and down and she started shaking like she was having a seizure.  Pt. has no history of seizures per son. CT head at OSH shows 5.5cm left frontal ICH, SAH and IVH.     PLAN:   Neuro:  -Q1 neuro checks  -CTA H/N now   -CT head in 6 hours for stability scan  -neurosurgery consult  -Sedation with precedex if needed  - tylenol prn for pain control  - activity: bedrest  -PT/OT/S&S ordered      Pulm:  -intubated: PRVC, TV: 450, RR 14, FiO2 50, PEEP 5  -Peridex  -Will repeat CXR to confirm ETT placement    CV:  --140  -TTE w/ bubble study for stroke workup  -continue home dose zocor    GI:  -NPO   -GI ppx: protonix  -Bowel regimen: senna, miralax  -CT A/P done at OSH- see results above    Renal:  -Na goal 140-150  - 2% @ 50  -BMP q6 hrs    Heme/Onc:  -Hold chemo ppx due to ICH  -SCDS for dvt ppx  -LED dopplers to r/o dvt on admission, high risk of dvt due to immobility at baseline  -will send stroke workup labs: HA1c, lipid panel, thyroid panel  -was on ASA 81 at home - will give DDAVP and platelets    Endo:  - HISS for hyperglycemia  - continue synthroid for hypothyroidism  -continue prednisone 5mg TID for RA    ID:  -monitor for fevers    CODE STATUS:  [x] Full Code [] DNR [] DNI [] Palliative/Comfort Care    DISPOSITION:  [x] ICU [] Stroke Unit [] Floor [] EMU [] RCU [] PCU    [x] Patient is at high risk of neurologic deterioration/death due to: ICH, brain herniation    Time spent:  45 [x] critical care minutes 65 yo F with AMS since this morning.  Pt. was confused, didn't recognize son this morning.  He notes she was sleeping more than usual yesterday, which tipped off son that something was wrong.  At 11 am pt. was gasping for air like she was seizing. 2 hours ago pt. was dry heaving, her chest was going up and down and she started shaking like she was having a seizure.  Pt. has no history of seizures per son. CT head at OSH shows 5.5cm left frontal ICH, SAH and IVH.     PLAN:   Neuro:  -Q1 neuro checks  -CTA H/N now   -CT head in 6 hours for stability scan  -neurosurgery consult  -Sedation with precedex if needed  - tylenol prn for pain control  - activity: bedrest  -PT/OT/S&S ordered      Pulm:  -intubated: PRVC, TV: 450, RR 14, FiO2 30, PEEP 5  -Peridex  -Will repeat CXR to confirm ETT placement    CV:  --140  -TTE w/ bubble study for stroke workup  -continue home dose zocor    GI:  -NPO   -GI ppx: protonix  -Bowel regimen: senna, miralax  -CT A/P done at OSH- see results above    Renal:  -Na goal 140-150  - 2% @ 50  -BMP q6 hrs    Heme/Onc:  -Hold chemo ppx due to ICH  -SCDS for dvt ppx  -LED dopplers to r/o dvt on admission, high risk of dvt due to immobility at baseline  -will send stroke workup labs: HA1c, lipid panel, thyroid panel  -was on ASA 81 at home - will give DDAVP and platelets    Endo:  - HISS for hyperglycemia  - continue synthroid for hypothyroidism  -continue prednisone 5mg TID for RA    ID:  -monitor for fevers    CODE STATUS:  [x] Full Code [] DNR [] DNI [] Palliative/Comfort Care    DISPOSITION:  [x] ICU [] Stroke Unit [] Floor [] EMU [] RCU [] PCU    [x] Patient is at high risk of neurologic deterioration/death due to: ICH, brain herniation    Time spent:  45 [x] critical care minutes 63 yo F with AMS since this morning.  Pt. was confused, didn't recognize son this morning.  He notes she was sleeping more than usual yesterday, which tipped off son that something was wrong.  At 11 am pt. was gasping for air like she was seizing. 2 hours ago pt. was dry heaving, her chest was going up and down and she started shaking like she was having a seizure.  Pt. has no history of seizures per son. CT head at OSH shows 5.5cm left frontal ICH, SAH and IVH.     PLAN:   Neuro:  -Q1 neuro checks  -CTA H/N and CTH now   -CT head in AM  -neurosurgery consult  -keppra for ?seizure on presentation  -Sedation with precedex if needed  - tylenol prn for pain control  - activity: bedrest  -PT/OT/S&S ordered      Pulm:  -intubated: PRVC, TV: 450, RR 14, FiO2 30, PEEP 5  -Peridex  -Will repeat CXR to confirm ETT placement    CV:  --140  -TTE w/ bubble study for stroke workup  -continue home dose zocor    GI:  -NPO   -GI ppx: protonix  -Bowel regimen: senna, miralax  -CT A/P done at OSH- see results above    Renal:  -Na goal 140-150  - 2% @ 50  -BMP q6 hrs    Heme/Onc:  -Hold chemo ppx due to ICH  -SCDS for dvt ppx  -LED dopplers to r/o dvt on admission, high risk of dvt due to immobility at baseline  -will send stroke workup labs: HA1c, lipid panel, thyroid panel  -was on ASA 81 at home - will give DDAVP and platelets    Endo:  - HISS for hyperglycemia  - continue synthroid for hypothyroidism  -continue prednisone 5mg TID for RA    ID:  -monitor for fevers    CODE STATUS:  [x] Full Code [] DNR [] DNI [] Palliative/Comfort Care    DISPOSITION:  [x] ICU [] Stroke Unit [] Floor [] EMU [] RCU [] PCU    [x] Patient is at high risk of neurologic deterioration/death due to: ICH, brain herniation    Time spent:  45 [x] critical care minutes

## 2020-05-29 NOTE — ED ADULT NURSE NOTE - NSIMPLEMENTINTERV_GEN_ALL_ED
Implemented All Fall Risk Interventions:  [place] to call system. Call bell, personal items and telephone within reach. Instruct patient to call for assistance. Room bathroom lighting operational. Non-slip footwear when patient is off stretcher. Physically safe environment: no spills, clutter or unnecessary equipment. Stretcher in lowest position, wheels locked, appropriate side rails in place. Provide visual cue, wrist band, yellow gown, etc. Monitor gait and stability. Monitor for mental status changes and reorient to person, place, and time. Review medications for side effects contributing to fall risk. Reinforce activity limits and safety measures with patient and family.

## 2020-05-29 NOTE — ED PROVIDER NOTE - PROGRESS NOTE DETAILS
pt. mental status waxing and waning, gargling on airway secretions at times, although maintaining O2 sat  pt. is not maintaining airway well and needs intubation, need to secure airway, will call respiratory pt. mental status waxing and waning, gargling on airway secretions at times, although maintaining O2 sat  pt. is not maintaining airway well and needs intubation, need to secure airway, will call respiratory  ICU consulted for eval brain bleeds noted on CT, calling transfer center now for neurosx brain bleeds noted on CT, calling transfer center now for neurosx  in interrim pt. intubated for airway protection, worsening mental status, R IJ line placed pt. accepted [facility] transfer to ICU at [facility], spoke with Dr. Austin

## 2020-05-29 NOTE — ED PROVIDER NOTE - PHYSICAL EXAMINATION
Vitals: HTn at 148/87, othewrise WNL  Gen: AAOx3, NAD, sitting comfortably in stretcher, responsive to questions, generally weak  Head: ncat, perrla, eomi b/l  Neck: supple, no lymphadenopathy, no midline deviation  Heart: rrr, no m/r/g  Lungs: CTA b/l, no rales/ronchi/wheezes  Abd: soft, nontender, obese but non-distended, no rebound or guarding, healed surgical scars at central lower abdomen  Ext: no clubbing/cyanosis/edema  Neuro: generally weak, pt. has difficulty lifting extremities off stretcher, but she does respond to questions

## 2020-05-29 NOTE — H&P ADULT - NSHPLABSRESULTS_GEN_ALL_CORE
< from: CT Head No Cont (05.29.20 @ 21:25) >      IMPRESSION:     5.5 cm left frontal parenchymal, scattered predominantly midline and left-sided subarachnoid hemorrhage and intraventricular hemorrhage.    Extensive bilateral frontoparietal occipital subcortical white matter hypodensity, with a parieto-occipital predominance, which may represent edema of unclear etiology and new since prior examination of 2/20/2020.    Further evaluation with CT angiogram and contrast-enhanced brain MRI is recommended.    < end of copied text >    < from: CT Abdomen and Pelvis No Cont (05.29.20 @ 21:26) >    IMPRESSION:    No acute gastrointestinal abnormality.  Redemonstration of a large ventral hernia containing nonobstructed small and large bowel.  Diverticulosis.  Normal appendix.  Previously seen emphysematous cystitis not appreciated on the current exam.  Myomatous uterus.      < end of copied text > < from: CT Abdomen and Pelvis No Cont (05.29.20 @ 21:26) >    IMPRESSION:    No acute gastrointestinal abnormality.  Redemonstration of a large ventral hernia containing nonobstructed small and large bowel.  Diverticulosis.  Normal appendix.  Previously seen emphysematous cystitis not appreciated on the current exam.  Myomatous uterus.    < end of copied text >    < from: CT Head No Cont (05.29.20 @ 21:25) >      IMPRESSION:     5.5 cm left frontal parenchymal, scattered predominantly midline and left-sided subarachnoid hemorrhage and intraventricular hemorrhage.    Extensive bilateral frontoparietal occipital subcortical white matter hypodensity, with a parieto-occipital predominance, which may represent edema of unclear etiology and new since prior examination of 2/20/2020.    Further evaluation with CT angiogram and contrast-enhanced brain MRI is recommended.    < end of copied text >

## 2020-05-29 NOTE — ED ADULT NURSE NOTE - OBJECTIVE STATEMENT
Patient was found seizing and unresponsive at home by family member. This is new onset of seizure per family. PMH htn, hld, hypothyroidism.

## 2020-05-29 NOTE — ED PROCEDURE NOTE - CPROC ED INFUS LINE DETAIL1
All lumen(s) aspirated and flushed without difficulty./The guidewire was recovered./The location was identified, and the area was draped and prepped./Ultrasound guidance was used during placement./The catheter was placed using sterile technique.

## 2020-05-29 NOTE — CHART NOTE - NSCHARTNOTEFT_GEN_A_CORE
CAPRINI SCORE [CLOT] Score on Admission for     AGE RELATED RISK FACTORS                                                       MOBILITY RELATED FACTORS  [ ] Age 41-60 years                                            (1 Point)                  [ ] Bed rest                                                        (1 Point)  [x ] Age: 61-74 years                                           (2 Points)                 [ ] Plaster cast                                                   (2 Points)  [ ] Age= 75 years                                              (3 Points)                 [ ] Bed bound for more than 72 hours                 (2 Points)    DISEASE RELATED RISK FACTORS                                               GENDER SPECIFIC FACTORS  [ ] Edema in the lower extremities                       (1 Point)                  [ ] Pregnancy                                                     (1 Point)  [ ] Varicose veins                                               (1 Point)                  [ ] Post-partum < 6 weeks                                   (1 Point)             [ ] BMI > 25 Kg/m2                                            (1 Point)                  [ ] Hormonal therapy  or oral contraception          (1 Point)                 [ ] Sepsis (in the previous month)                        (1 Point)                  [ ] History of pregnancy complications                 (1 point)  [ ] Pneumonia or serious lung disease                                               [ ] Unexplained or recurrent                     (1 Point)           (in the previous month)                               (1 Point)  [ ] Abnormal pulmonary function test                     (1 Point)                 SURGERY RELATED RISK FACTORS (include planned surgeries)  [ ] Acute myocardial infarction                              (1 Point)                 [ ]  Section                                             (1 Point)  [ ] Congestive heart failure (in the previous month)  (1 Point)         [ ] Minor surgery                                                  (1 Point)   [ ] Inflammatory bowel disease                             (1 Point)                 [ ] Arthroscopic surgery                                        (2 Points)  [ ] Central venous access                                      (2 Points)                [ x] General surgery lasting more than 45 minutes   (2 Points)       [ x] Stroke (in the previous month)                          (5 Points)               [ ] Elective arthroplasty                                         (5 Points)            [ ] current or past malignancy                              (2 Points)                                                                                                       HEMATOLOGY RELATED FACTORS                                                 TRAUMA RELATED RISK FACTORS  [ ] Prior episodes of VTE                                     (3 Points)                [ ] Fracture of the hip, pelvis, or leg                       (5 Points)  [ ] Positive family history for VTE                         (3 Points)                 [ ] Acute spinal cord injury (in the previous month)  (5 Points)  [ ] Prothrombin 82111 A                                     (3 Points)                 [ ] Paralysis  (less than 1 month)                             (5 Points)  [ ] Factor V Leiden                                             (3 Points)                  [ ] Multiple Trauma within 1 month                        (5 Points)  [ ] Lupus anticoagulants                                     (3 Points)                                                           [ ] Anticardiolipin antibodies                               (3 Points)                                                       [ ] High homocysteine in the blood                      (3 Points)                                             [ ] Other congenital or acquired thrombophilia      (3 Points)                                                [ ] Heparin induced thrombocytopenia                  (3 Points)                                          Total Score [   9       ]    Risk:  Very low 0   Low 1 to 2   Moderate 3 to 4   High =5       VTE Prophylasix Recommednations:  [x ] mechanical pneumatic compression devices                                      [ ] contraindicated: _____________________  [ ] chemo prophylasix                                                                                   [ x] contraindicated _______ICH______________    **** HIGH LIKELIHOOD DVT PRESENT ON ADMISSION  [x ] (please order LE dopplers within 24 hours of admission)

## 2020-05-30 LAB
A1C WITH ESTIMATED AVERAGE GLUCOSE RESULT: 7.4 % — HIGH (ref 4–5.6)
ANION GAP SERPL CALC-SCNC: 12 MMOL/L — SIGNIFICANT CHANGE UP (ref 5–17)
ANION GAP SERPL CALC-SCNC: 13 MMOL/L — SIGNIFICANT CHANGE UP (ref 5–17)
ANION GAP SERPL CALC-SCNC: 13 MMOL/L — SIGNIFICANT CHANGE UP (ref 5–17)
ANION GAP SERPL CALC-SCNC: 15 MMOL/L — SIGNIFICANT CHANGE UP (ref 5–17)
APPEARANCE UR: CLEAR — SIGNIFICANT CHANGE UP
BACTERIA # UR AUTO: NEGATIVE — SIGNIFICANT CHANGE UP
BILIRUB UR-MCNC: NEGATIVE — SIGNIFICANT CHANGE UP
BLD GP AB SCN SERPL QL: NEGATIVE — SIGNIFICANT CHANGE UP
BUN SERPL-MCNC: 14 MG/DL — SIGNIFICANT CHANGE UP (ref 7–23)
BUN SERPL-MCNC: 19 MG/DL — SIGNIFICANT CHANGE UP (ref 7–23)
BUN SERPL-MCNC: 26 MG/DL — HIGH (ref 7–23)
BUN SERPL-MCNC: 27 MG/DL — HIGH (ref 7–23)
CALCIUM SERPL-MCNC: 8.8 MG/DL — SIGNIFICANT CHANGE UP (ref 8.4–10.5)
CALCIUM SERPL-MCNC: 9 MG/DL — SIGNIFICANT CHANGE UP (ref 8.4–10.5)
CALCIUM SERPL-MCNC: 9.1 MG/DL — SIGNIFICANT CHANGE UP (ref 8.4–10.5)
CALCIUM SERPL-MCNC: 9.2 MG/DL — SIGNIFICANT CHANGE UP (ref 8.4–10.5)
CHLORIDE SERPL-SCNC: 106 MMOL/L — SIGNIFICANT CHANGE UP (ref 96–108)
CHLORIDE SERPL-SCNC: 108 MMOL/L — SIGNIFICANT CHANGE UP (ref 96–108)
CHLORIDE SERPL-SCNC: 115 MMOL/L — HIGH (ref 96–108)
CHLORIDE SERPL-SCNC: 116 MMOL/L — HIGH (ref 96–108)
CHOLEST SERPL-MCNC: 202 MG/DL — HIGH (ref 10–199)
CO2 SERPL-SCNC: 19 MMOL/L — LOW (ref 22–31)
CO2 SERPL-SCNC: 20 MMOL/L — LOW (ref 22–31)
CO2 SERPL-SCNC: 21 MMOL/L — LOW (ref 22–31)
CO2 SERPL-SCNC: 21 MMOL/L — LOW (ref 22–31)
COLOR SPEC: SIGNIFICANT CHANGE UP
CREAT SERPL-MCNC: 0.62 MG/DL — SIGNIFICANT CHANGE UP (ref 0.5–1.3)
CREAT SERPL-MCNC: 0.63 MG/DL — SIGNIFICANT CHANGE UP (ref 0.5–1.3)
CREAT SERPL-MCNC: 0.79 MG/DL — SIGNIFICANT CHANGE UP (ref 0.5–1.3)
CREAT SERPL-MCNC: 0.86 MG/DL — SIGNIFICANT CHANGE UP (ref 0.5–1.3)
DIFF PNL FLD: NEGATIVE — SIGNIFICANT CHANGE UP
EPI CELLS # UR: 2 /HPF — SIGNIFICANT CHANGE UP
ESTIMATED AVERAGE GLUCOSE: 166 MG/DL — HIGH (ref 68–114)
GAS PNL BLDA: SIGNIFICANT CHANGE UP
GLUCOSE BLDC GLUCOMTR-MCNC: 187 MG/DL — HIGH (ref 70–99)
GLUCOSE BLDC GLUCOMTR-MCNC: 195 MG/DL — HIGH (ref 70–99)
GLUCOSE BLDC GLUCOMTR-MCNC: 197 MG/DL — HIGH (ref 70–99)
GLUCOSE BLDC GLUCOMTR-MCNC: 246 MG/DL — HIGH (ref 70–99)
GLUCOSE SERPL-MCNC: 191 MG/DL — HIGH (ref 70–99)
GLUCOSE SERPL-MCNC: 213 MG/DL — HIGH (ref 70–99)
GLUCOSE SERPL-MCNC: 230 MG/DL — HIGH (ref 70–99)
GLUCOSE SERPL-MCNC: 242 MG/DL — HIGH (ref 70–99)
GLUCOSE UR QL: NEGATIVE — SIGNIFICANT CHANGE UP
HCT VFR BLD CALC: 30.5 % — LOW (ref 34.5–45)
HCT VFR BLD CALC: 35.7 % — SIGNIFICANT CHANGE UP (ref 34.5–45)
HDLC SERPL-MCNC: 49 MG/DL — LOW
HGB BLD-MCNC: 10.7 G/DL — LOW (ref 11.5–15.5)
HGB BLD-MCNC: 9.1 G/DL — LOW (ref 11.5–15.5)
HYALINE CASTS # UR AUTO: 0 /LPF — SIGNIFICANT CHANGE UP (ref 0–2)
KETONES UR-MCNC: SIGNIFICANT CHANGE UP
LEUKOCYTE ESTERASE UR-ACNC: ABNORMAL
LIPID PNL WITH DIRECT LDL SERPL: 88 MG/DL — SIGNIFICANT CHANGE UP
MAGNESIUM SERPL-MCNC: 1.8 MG/DL — SIGNIFICANT CHANGE UP (ref 1.6–2.6)
MAGNESIUM SERPL-MCNC: 1.8 MG/DL — SIGNIFICANT CHANGE UP (ref 1.6–2.6)
MAGNESIUM SERPL-MCNC: 1.9 MG/DL — SIGNIFICANT CHANGE UP (ref 1.6–2.6)
MCHC RBC-ENTMCNC: 28.3 PG — SIGNIFICANT CHANGE UP (ref 27–34)
MCHC RBC-ENTMCNC: 28.5 PG — SIGNIFICANT CHANGE UP (ref 27–34)
MCHC RBC-ENTMCNC: 29.8 GM/DL — LOW (ref 32–36)
MCHC RBC-ENTMCNC: 30 GM/DL — LOW (ref 32–36)
MCV RBC AUTO: 94.4 FL — SIGNIFICANT CHANGE UP (ref 80–100)
MCV RBC AUTO: 95.6 FL — SIGNIFICANT CHANGE UP (ref 80–100)
NITRITE UR-MCNC: POSITIVE
NRBC # BLD: 0 /100 WBCS — SIGNIFICANT CHANGE UP (ref 0–0)
NRBC # BLD: 0 /100 WBCS — SIGNIFICANT CHANGE UP (ref 0–0)
PH UR: 7 — SIGNIFICANT CHANGE UP (ref 5–8)
PHOSPHATE SERPL-MCNC: 2.8 MG/DL — SIGNIFICANT CHANGE UP (ref 2.5–4.5)
PHOSPHATE SERPL-MCNC: 3.3 MG/DL — SIGNIFICANT CHANGE UP (ref 2.5–4.5)
PHOSPHATE SERPL-MCNC: 4.5 MG/DL — SIGNIFICANT CHANGE UP (ref 2.5–4.5)
PLATELET # BLD AUTO: 471 K/UL — HIGH (ref 150–400)
PLATELET # BLD AUTO: 523 K/UL — HIGH (ref 150–400)
POTASSIUM SERPL-MCNC: 3.6 MMOL/L — SIGNIFICANT CHANGE UP (ref 3.5–5.3)
POTASSIUM SERPL-MCNC: 3.8 MMOL/L — SIGNIFICANT CHANGE UP (ref 3.5–5.3)
POTASSIUM SERPL-MCNC: 4.3 MMOL/L — SIGNIFICANT CHANGE UP (ref 3.5–5.3)
POTASSIUM SERPL-MCNC: 5.3 MMOL/L — SIGNIFICANT CHANGE UP (ref 3.5–5.3)
POTASSIUM SERPL-SCNC: 3.6 MMOL/L — SIGNIFICANT CHANGE UP (ref 3.5–5.3)
POTASSIUM SERPL-SCNC: 3.8 MMOL/L — SIGNIFICANT CHANGE UP (ref 3.5–5.3)
POTASSIUM SERPL-SCNC: 4.3 MMOL/L — SIGNIFICANT CHANGE UP (ref 3.5–5.3)
POTASSIUM SERPL-SCNC: 5.3 MMOL/L — SIGNIFICANT CHANGE UP (ref 3.5–5.3)
PROT UR-MCNC: ABNORMAL
RBC # BLD: 3.19 M/UL — LOW (ref 3.8–5.2)
RBC # BLD: 3.78 M/UL — LOW (ref 3.8–5.2)
RBC # FLD: 25 % — HIGH (ref 10.3–14.5)
RBC # FLD: 26 % — HIGH (ref 10.3–14.5)
RBC CASTS # UR COMP ASSIST: 3 /HPF — SIGNIFICANT CHANGE UP (ref 0–4)
RH IG SCN BLD-IMP: POSITIVE — SIGNIFICANT CHANGE UP
SARS-COV-2 RNA SPEC QL NAA+PROBE: SIGNIFICANT CHANGE UP
SARS-COV-2 RNA SPEC QL NAA+PROBE: SIGNIFICANT CHANGE UP
SODIUM SERPL-SCNC: 140 MMOL/L — SIGNIFICANT CHANGE UP (ref 135–145)
SODIUM SERPL-SCNC: 142 MMOL/L — SIGNIFICANT CHANGE UP (ref 135–145)
SODIUM SERPL-SCNC: 147 MMOL/L — HIGH (ref 135–145)
SODIUM SERPL-SCNC: 150 MMOL/L — HIGH (ref 135–145)
SP GR SPEC: >1.05 (ref 1.01–1.02)
T3 SERPL-MCNC: 51 NG/DL — LOW (ref 80–200)
T4 AB SER-ACNC: 5.7 UG/DL — SIGNIFICANT CHANGE UP (ref 4.6–12)
TOTAL CHOLESTEROL/HDL RATIO MEASUREMENT: 4.1 RATIO — SIGNIFICANT CHANGE UP (ref 3.3–7.1)
TRIGL SERPL-MCNC: 326 MG/DL — HIGH (ref 10–149)
TSH SERPL-MCNC: 12.3 UIU/ML — HIGH (ref 0.27–4.2)
UROBILINOGEN FLD QL: NEGATIVE — SIGNIFICANT CHANGE UP
WBC # BLD: 17.97 K/UL — HIGH (ref 3.8–10.5)
WBC # BLD: 19.8 K/UL — HIGH (ref 3.8–10.5)
WBC # FLD AUTO: 17.97 K/UL — HIGH (ref 3.8–10.5)
WBC # FLD AUTO: 19.8 K/UL — HIGH (ref 3.8–10.5)
WBC UR QL: 32 /HPF — HIGH (ref 0–5)

## 2020-05-30 PROCEDURE — 71045 X-RAY EXAM CHEST 1 VIEW: CPT | Mod: 26,76

## 2020-05-30 PROCEDURE — 70496 CT ANGIOGRAPHY HEAD: CPT | Mod: 26

## 2020-05-30 PROCEDURE — 93306 TTE W/DOPPLER COMPLETE: CPT | Mod: 26

## 2020-05-30 PROCEDURE — 70498 CT ANGIOGRAPHY NECK: CPT | Mod: 26

## 2020-05-30 PROCEDURE — 99291 CRITICAL CARE FIRST HOUR: CPT

## 2020-05-30 PROCEDURE — 93970 EXTREMITY STUDY: CPT | Mod: 26

## 2020-05-30 PROCEDURE — 70496 CT ANGIOGRAPHY HEAD: CPT | Mod: 26,77

## 2020-05-30 PROCEDURE — 99292 CRITICAL CARE ADDL 30 MIN: CPT

## 2020-05-30 RX ORDER — SODIUM CHLORIDE 5 G/100ML
1000 INJECTION, SOLUTION INTRAVENOUS
Refills: 0 | Status: DISCONTINUED | OUTPATIENT
Start: 2020-05-30 | End: 2020-05-30

## 2020-05-30 RX ORDER — SODIUM CHLORIDE 5 G/100ML
500 INJECTION, SOLUTION INTRAVENOUS
Refills: 0 | Status: DISCONTINUED | OUTPATIENT
Start: 2020-05-30 | End: 2020-05-30

## 2020-05-30 RX ORDER — DESMOPRESSIN ACETATE 0.1 MG/1
40 TABLET ORAL ONCE
Refills: 0 | Status: DISCONTINUED | OUTPATIENT
Start: 2020-05-30 | End: 2020-05-30

## 2020-05-30 RX ORDER — DESMOPRESSIN ACETATE 0.1 MG/1
40 TABLET ORAL ONCE
Refills: 0 | Status: COMPLETED | OUTPATIENT
Start: 2020-05-30 | End: 2020-05-30

## 2020-05-30 RX ORDER — SIMVASTATIN 20 MG/1
20 TABLET, FILM COATED ORAL AT BEDTIME
Refills: 0 | Status: DISCONTINUED | OUTPATIENT
Start: 2020-05-30 | End: 2020-07-13

## 2020-05-30 RX ORDER — INSULIN LISPRO 100/ML
VIAL (ML) SUBCUTANEOUS EVERY 6 HOURS
Refills: 0 | Status: DISCONTINUED | OUTPATIENT
Start: 2020-05-30 | End: 2020-05-31

## 2020-05-30 RX ORDER — SODIUM CHLORIDE 9 MG/ML
10 INJECTION INTRAMUSCULAR; INTRAVENOUS; SUBCUTANEOUS
Refills: 0 | Status: DISCONTINUED | OUTPATIENT
Start: 2020-05-30 | End: 2020-07-13

## 2020-05-30 RX ORDER — CEFTRIAXONE 500 MG/1
1000 INJECTION, POWDER, FOR SOLUTION INTRAMUSCULAR; INTRAVENOUS EVERY 24 HOURS
Refills: 0 | Status: DISCONTINUED | OUTPATIENT
Start: 2020-05-30 | End: 2020-06-01

## 2020-05-30 RX ORDER — LEVETIRACETAM 250 MG/1
1000 TABLET, FILM COATED ORAL EVERY 12 HOURS
Refills: 0 | Status: DISCONTINUED | OUTPATIENT
Start: 2020-05-30 | End: 2020-06-02

## 2020-05-30 RX ORDER — FENTANYL CITRATE 50 UG/ML
25 INJECTION INTRAVENOUS ONCE
Refills: 0 | Status: DISCONTINUED | OUTPATIENT
Start: 2020-05-30 | End: 2020-05-30

## 2020-05-30 RX ORDER — LEVOTHYROXINE SODIUM 125 MCG
100 TABLET ORAL AT BEDTIME
Refills: 0 | Status: DISCONTINUED | OUTPATIENT
Start: 2020-05-30 | End: 2020-06-01

## 2020-05-30 RX ORDER — POTASSIUM CHLORIDE 20 MEQ
40 PACKET (EA) ORAL ONCE
Refills: 0 | Status: COMPLETED | OUTPATIENT
Start: 2020-05-30 | End: 2020-05-30

## 2020-05-30 RX ORDER — NICARDIPINE HYDROCHLORIDE 30 MG/1
5 CAPSULE, EXTENDED RELEASE ORAL
Qty: 40 | Refills: 0 | Status: DISCONTINUED | OUTPATIENT
Start: 2020-05-30 | End: 2020-05-31

## 2020-05-30 RX ORDER — METOPROLOL TARTRATE 50 MG
12.5 TABLET ORAL
Refills: 0 | Status: DISCONTINUED | OUTPATIENT
Start: 2020-05-30 | End: 2020-06-06

## 2020-05-30 RX ORDER — FENTANYL CITRATE 50 UG/ML
25 INJECTION INTRAVENOUS
Refills: 0 | Status: DISCONTINUED | OUTPATIENT
Start: 2020-05-30 | End: 2020-06-05

## 2020-05-30 RX ORDER — PANTOPRAZOLE SODIUM 20 MG/1
40 TABLET, DELAYED RELEASE ORAL DAILY
Refills: 0 | Status: DISCONTINUED | OUTPATIENT
Start: 2020-05-30 | End: 2020-06-09

## 2020-05-30 RX ADMIN — Medication 5 MILLIGRAM(S): at 21:59

## 2020-05-30 RX ADMIN — Medication 2: at 17:56

## 2020-05-30 RX ADMIN — CHLORHEXIDINE GLUCONATE 1 APPLICATION(S): 213 SOLUTION TOPICAL at 22:24

## 2020-05-30 RX ADMIN — LEVETIRACETAM 400 MILLIGRAM(S): 250 TABLET, FILM COATED ORAL at 05:43

## 2020-05-30 RX ADMIN — POLYETHYLENE GLYCOL 3350 17 GRAM(S): 17 POWDER, FOR SOLUTION ORAL at 05:43

## 2020-05-30 RX ADMIN — Medication 100 MICROGRAM(S): at 21:59

## 2020-05-30 RX ADMIN — DESMOPRESSIN ACETATE 240 MICROGRAM(S): 0.1 TABLET ORAL at 02:07

## 2020-05-30 RX ADMIN — PANTOPRAZOLE SODIUM 40 MILLIGRAM(S): 20 TABLET, DELAYED RELEASE ORAL at 11:59

## 2020-05-30 RX ADMIN — Medication 40 MILLIEQUIVALENT(S): at 18:42

## 2020-05-30 RX ADMIN — FENTANYL CITRATE 25 MICROGRAM(S): 50 INJECTION INTRAVENOUS at 04:33

## 2020-05-30 RX ADMIN — Medication 4: at 12:18

## 2020-05-30 RX ADMIN — SENNA PLUS 2 TABLET(S): 8.6 TABLET ORAL at 21:59

## 2020-05-30 RX ADMIN — LEVETIRACETAM 400 MILLIGRAM(S): 250 TABLET, FILM COATED ORAL at 17:56

## 2020-05-30 RX ADMIN — CEFTRIAXONE 100 MILLIGRAM(S): 500 INJECTION, POWDER, FOR SOLUTION INTRAMUSCULAR; INTRAVENOUS at 14:54

## 2020-05-30 RX ADMIN — SODIUM CHLORIDE 100 MILLILITER(S): 5 INJECTION, SOLUTION INTRAVENOUS at 09:09

## 2020-05-30 RX ADMIN — CHLORHEXIDINE GLUCONATE 15 MILLILITER(S): 213 SOLUTION TOPICAL at 17:55

## 2020-05-30 RX ADMIN — Medication 2: at 23:42

## 2020-05-30 RX ADMIN — Medication 5 MILLIGRAM(S): at 14:25

## 2020-05-30 RX ADMIN — Medication 5 MILLIGRAM(S): at 05:44

## 2020-05-30 RX ADMIN — Medication 12.5 MILLIGRAM(S): at 21:58

## 2020-05-30 RX ADMIN — Medication 2: at 05:44

## 2020-05-30 RX ADMIN — CHLORHEXIDINE GLUCONATE 15 MILLILITER(S): 213 SOLUTION TOPICAL at 05:43

## 2020-05-30 RX ADMIN — POLYETHYLENE GLYCOL 3350 17 GRAM(S): 17 POWDER, FOR SOLUTION ORAL at 17:56

## 2020-05-30 RX ADMIN — SIMVASTATIN 20 MILLIGRAM(S): 20 TABLET, FILM COATED ORAL at 21:59

## 2020-05-30 NOTE — PROCEDURE NOTE - NSPROCDETAILS_GEN_ALL_CORE
guidewire recovered/sterile dressing applied/sterile technique, catheter placed/ultrasound guidance/lumen(s) aspirated and flushed
sutured in place/connected to a pressurized flush line/all materials/supplies accounted for at end of procedure/location identified, draped/prepped, sterile technique used, needle inserted/introduced/Seldinger technique/ultrasound guidance/positive blood return obtained via catheter

## 2020-05-30 NOTE — CONSULT NOTE ADULT - ASSESSMENT
AD - Loren Morrow  64F w/ extensive medical hx and functionally bed bound at baseline and morbidly obese direct to NSCU intubated transfer for possib seizure activity at home found to have L IPH w/ IVH and SAH extension, CTA grossly neg, stable on repeat scan, on exam interm EO to noxious, held versed drip on arrival, flaccid x4 with + brain stem reflexes  - No acute intervention, possible angio on this admission  - Supportive care per NSCU

## 2020-05-30 NOTE — PROGRESS NOTE ADULT - SUBJECTIVE AND OBJECTIVE BOX
NSCU ATTENDING -- ADDITIONAL PROGRESS NOTE    Nighttime rounds were performed -- please refer to earlier Progress Note for HPI details.    T(C): 37.2 (05-30-20 @ 19:00), Max: 37.2 (05-30-20 @ 19:00)  HR: 84 (05-30-20 @ 20:59) (46 - 105)  BP: 95/66 (05-30-20 @ 02:30) (95/66 - 120/94)  RR: 15 (05-30-20 @ 20:00) (0 - 24)  SpO2: 100% (05-30-20 @ 20:59) (60% - 100%)  Wt(kg): --    Relevant labwork and imaging reviewed.    Patient remains critically ill.    Still on cEEG.  On my bedside assessment, no epileptiform abnormalities.  CTV today with no thrombosis.  Hold 2% for now (Na 142 to 147 in ~6 hours).  Neuro exam is poor.    Additional 30 minutes of critical care time.

## 2020-05-30 NOTE — PROGRESS NOTE ADULT - ASSESSMENT
65 yo F with AMS since this morning, CT demonstrated RT frontal ICH with IVH. Questionable seizure, intubated at [facility] and transferred to [facility] for management.     PLAN:   Neuro:  -Q1 neuro checks  -CT V pending (r/o sinus thrombus)  -was on ASA 81 at home - got DDAVP and platelets overnight  -keppra for ?seizure on presentation  -Sedation with precedex if needed  - tylenol prn for pain control  - activity: bedrest  -PT/OT/S&S ordered      Pulm:  -intubated: Mode: AC/ CMV (Assist Control/ Continuous Mandatory Ventilation)  RR (machine): 14  TV (machine): 450  FiO2: 50  PEEP: 5  ITime: 1  MAP: 19  PIP: 38  -Peridex for oral hygiene      CV:  --140  -TTE w/ bubble study for stroke workup  -continue home dose zocor    GI:  -NPO   -GI ppx: protonix  -Bowel regimen: senna, miralax  -CT A/P done at OSH- see results above    Renal:  -Na goal 140-150  - 2% @ 50  -BMP q6 hrs    Heme/Onc:  -Hold chemo ppx due to ICH  -SCDS for dvt ppx  -LED dopplers to r/o dvt on admission, high risk of dvt due to immobility at baseline      Endo:  - HISS for hyperglycemia  - continue synthroid for hypothyroidism  -continue prednisone 5mg TID for RA    ID:  -monitor for fevers  -follow-up panCx for hypothermia    CODE STATUS:  [x] Full Code [] DNR [] DNI [] Palliative/Comfort Care    DISPOSITION:  [x] ICU    [x] Patient is at high risk of neurologic deterioration/death due to: ICH, brain herniation    Time spent:  45 [x] critical care minutes 63 yo F with AMS since this morning, CT demonstrated RT frontal ICH with IVH. Questionable seizure, intubated at [facility] and transferred to [facility] for management.     PLAN:   Neuro:  -Q1 neuro checks  -CT V pending (r/o sinus thrombus)  - consider starting Heparin GTT PTT goal 50-70, CTV pending  -was on ASA 81 at home - got DDAVP and platelets overnight  -keppra for ?seizure on presentation  -Sedation with precedex if needed  - tylenol prn for pain control  - GOC with family, patient at high risk with ICH / comorbidities and possible Hep GTT 2/2 suspected Venous thrombosis  - activity: bedrest  -PT/OT/S&S ordered      Pulm:  -intubated: Mode: AC/ CMV (Assist Control/ Continuous Mandatory Ventilation)  RR (machine): 14  TV (machine): 450  FiO2: 50  PEEP: 5  ITime: 1  MAP: 19  PIP: 38  -Peridex for oral hygiene      CV:  --140  -TTE w/ bubble study for stroke workup  -continue home dose zocor    GI:  -NPO   -GI ppx: protonix  -Bowel regimen: senna, miralax  -CT A/P done at OSH- see results above    Renal:  -Na goal 145-155  - 3% @ 100  -BMP q6 hrs    Heme/Onc:  -Hold chemo ppx due to ICH  -SCDS for dvt ppx  -LED dopplers to r/o dvt on admission, high risk of dvt due to immobility at baseline      Endo:  - HISS for hyperglycemia  - continue synthroid for hypothyroidism  -continue prednisone 5mg TID for RA    ID:  -monitor for fevers  -follow-up panCx for hypothermia    CODE STATUS:  [x] Full Code [] DNR [] DNI [] Palliative/Comfort Care    DISPOSITION:  [x] ICU    [x] Patient is at high risk of neurologic deterioration/death due to: ICH, brain herniation    Time spent:  45 [x] critical care minutes

## 2020-05-30 NOTE — CONSULT NOTE ADULT - SUBJECTIVE AND OBJECTIVE BOX
p ([phone])     HPI:  65 yo F with AMS since this morning.  Pt. was confused, didn't recognize son this morning.  He notes she was sleeping more than usual yesterday, which tipped off son that something was wrong.  At 11 am pt. was gasping for air like she was seizing. 2 hours ago pt. was dry heaving, her chest was going up and down and she started shaking like she was having a seizure.  Pt. has no history of seizures per son.  		PMH: hypothyroidism, HTN, IDDM, COPD/CHRIS on home O2, RA (on Prednisone), SBO s/p ex lap with lysis of adhesions c/b evisceration,  recent hospitalization in 3/2020 with encephalopathy, significantly hypothyroid (, without myxedema coma), small (stable) right sided SDH, UTI, catatonic reaction to VPA (now resolved) and DKA and psychiatric issues, 2020 hospitalization for GIB, Emphysematous cystitis, endometritis, Sigmoid colitis, morbid obesity, functional quadriplegia    HCP: Norris Rush, Son [phone]; Daughter, Heavenly Schmidt [phone]; son asks that only him or his sister, Heavenly be contacted for patient updates/information as they are both proxy's for patient (29 May 2020 23:41)    --Anticoagulation:    =====================  PAST MEDICAL HISTORY   Diverticulosis  RA (Rheumatoid Arthritis)  Tooth Abscess  Arthritis  Cigarette Smoker  Chronic Asthma  Adult Hypothyroidism  Borderline Diabetes Mellitus  High Cholesterol  H/O: HTN    PAST SURGICAL HISTORY   Wrist Disorder  History of  Section    Depakote (Other)  Seroquel (Other (Severe))      MEDICATIONS:  Antibiotics:    Neuro:  acetaminophen    Suspension .. 650 milliGRAM(s) Oral every 6 hours PRN  levETIRAcetam  IVPB 1000 milliGRAM(s) IV Intermittent every 12 hours    Other:  insulin lispro (HumaLOG) corrective regimen sliding scale   SubCutaneous every 6 hours  levothyroxine Injectable 100 MICROGram(s) IV Push at bedtime  pantoprazole  Injectable 40 milliGRAM(s) IV Push daily  polyethylene glycol 3350 17 Gram(s) Oral every 12 hours  predniSONE   Tablet 5 milliGRAM(s) Oral three times a day  senna 2 Tablet(s) Oral at bedtime  simvastatin 20 milliGRAM(s) Oral at bedtime  sodium chloride 2% . 1000 milliLiter(s) IV Continuous <Continuous>      SOCIAL HISTORY:   Occupation:   Marital Status:     FAMILY HISTORY:  No pertinent family history in first degree relatives      ROS: Negative except per HPI    LABS:  PT/INR - ( 29 May 2020 20:22 )   PT: 11.4 sec;   INR: 1.02 ratio         PTT - ( 29 May 2020 20:22 )  PTT:17.6 sec                        10.7   17.97 )-----------( 523      ( 30 May 2020 01:30 )             35.7     05-30    140  |  106  |  27<H>  ----------------------------<  213<H>  5.3   |  19<L>  |  0.79    Ca    9.0      30 May 2020 01:30  Phos  4.5     05-30  Mg     1.9     30    TPro  7.1  /  Alb  2.7<L>  /  TBili  0.3  /  DBili  x   /  AST  34  /  ALT  39  /  AlkPhos  103

## 2020-05-30 NOTE — SPEECH LANGUAGE PATHOLOGY EVALUATION - SLP DIAGNOSIS
Pt admitted with ICH & saccular aneurysm. Pt currently intubated. Speech-Language Evaluation ordered. Pt admitted with ICH & saccular aneurysm. Pt currently intubated. Speech-Language Evaluation ordered. Attempted to complete speech-language evaluation, however, upon arrival to room, Pt intubated, eyes minimally open and pt unable to follow simple commands. Formal speech-language evaluation deferred at this time due to lack of participation. Discussed with KEELEY Mukherjee and ESVIN Méndez - please notify this service when Pt deemed appropriate.

## 2020-05-30 NOTE — PHYSICAL THERAPY INITIAL EVALUATION ADULT - PRECAUTIONS/LIMITATIONS, REHAB EVAL
seizure precautions/PMHx hypothyroidism, HTN, DM, COPD/CHRIS on home O2, RA (on Prednisone), SBO s/p ex lap with lysis of adhesions c/b evisceration,  recent hospitalization in 3/2020 with encephalopathy, significantly hypothyroid (, without myxedema coma), small (stable) right sided SDH, UTI, catatonic reaction to VPA (now resolved) and DKA and psychiatric issues, 5/2020 hospitalization for GIB, Emphysematous cystitis, endometritis, Sigmoid colitis, morbid obesity, functional quadriplegia./fall precautions

## 2020-05-30 NOTE — SPEECH LANGUAGE PATHOLOGY EVALUATION - COMMENTS
CT Angio Neck 5/30: 1.  Stable intracranial hemorrhage.  Stable mild hydrocephalus. 2. Diffuse bilateral white matter hypoattenuation is stable from 05/29/2020 but new from 02/20/2020. There is a 7 mm saccular aneurysm arising from the distal cervical left internal carotid artery, just proximal to the skull base.  CT ANGIOGRAPHY BRAIN: 1.  There is complete lack of opacification of the dural venous sinuses within the superficial and deep venous systems.  Follow-up CT the and postcontrast head CT scan is recommended to exclude complete venous sinus thrombosis, which may explain the patient's diffuse bilateral white matter hypoattenuation and intracranial hemorrhage  4.  There appears to be a 2 mm intradural aneurysm arising from the proximal supraclinoid left internal carotid artery and a 1-2 mm intradural aneurysm arising from the proximal supraclinoid right internal carotid artery. 5. Possible small extradural aneurysms in the cavernous segments of both internal carotid arteries.

## 2020-05-30 NOTE — PROGRESS NOTE ADULT - SUBJECTIVE AND OBJECTIVE BOX
HPI:  63 yo F with AMS since this morning.  Pt. was confused, didn't recognize son this morning.  He notes she was sleeping more than usual yesterday, which tipped off son that something was wrong.  At 11 am pt. was gasping for air like she was seizing. 2 hours ago pt. was dry heaving, her chest was going up and down and she started shaking like she was having a seizure.  Pt. has no history of seizures per son.  		PMH: hypothyroidism, HTN, IDDM, COPD/CHRIS on home O2, RA (on Prednisone), SBO s/p ex lap with lysis of adhesions c/b evisceration,  recent hospitalization in 3/2020 with encephalopathy, significantly hypothyroid (, without myxedema coma), small (stable) right sided SDH, UTI, catatonic reaction to VPA (now resolved) and DKA and psychiatric issues, 2020 hospitalization for GIB, Emphysematous cystitis, endometritis, Sigmoid colitis, morbid obesity, functional quadriplegia.    ADMISSION SCORES: ICH score 2     HCP: Norris Rush, Son [phone]; Daughter, Heavenly Schmidt [phone]; son asks that only him or his sister, Heavenly be contacted for patient updates/information as they are both proxy's for patient (29 May 2020 23:41)     Overnight events: Tx from [place], pan-cultured s/p hypothermic 35.8, stability CT head, DDAVP given for ASA reversal.     PAST MEDICAL HISTORY: Diverticulosis  Cellulitis  CVA (Cerebral Vascular Accident)  RA (Rheumatoid Arthritis)  Tooth Abscess  Arthritis  Cigarette Smoker  Chronic Asthma  Adult Hypothyroidism  Borderline Diabetes Mellitus  High Cholesterol  H/O: HTN    PAST SURGICAL HISTORY: Wrist Disorder  History of  Section      FAMILY HISTORY:  No pertinent family history in first degree relatives    ALLERGIES: Depakote (Other)  Seroquel (Other (Severe))      ICU Vital Signs Last 24 Hrs  T(C): 36.1 (30 May 2020 05:00), Max: 36.5 (29 May 2020 23:54)  T(F): 97 (30 May 2020 05:00), Max: 97.7 (29 May 2020 23:54)  HR: 63 (30 May 2020 06:00) (46 - 88)  BP: 95/66 (30 May 2020 02:30) (95/66 - 148/87)  BP(mean): 77 (30 May 2020 02:30) (77 - 104)  ABP: 129/71 (30 May 2020 06:00) (95/53 - 130/66)  ABP(mean): 93 (30 May 2020 06:00) (69 - 93)  RR: 14 (30 May 2020 06:00) (14 - 22)  SpO2: 100% (30 May 2020 06:00) (60% - 100%)      @ 07:01  -   @ 06:49  --------------------------------------------------------  IN: 795 mL / OUT: 150 mL / NET: 645 mL                             10.7   17.97 )-----------( 523      ( 30 May 2020 01:30 )             35.7    05-30    140  |  106  |  27<H>  ----------------------------<  213<H>  5.3   |  19<L>  |  0.79    Ca    9.0      30 May 2020 01:30  Phos  4.5     30  Mg     1.9     30    TPro  7.1  /  Alb  2.7<L>  /  TBili  0.3  /  DBili  x   /  AST  34  /  ALT  39  /  AlkPhos  103  05-29   ABG - ( 30 May 2020 01:19 )  pH, Arterial: 7.40  pH, Blood: x     /  pCO2: 38    /  pO2: 193   / HCO3: 24    / Base Excess: -.5   /  SaO2: 100         REVIEW OF SYSTEMS: Unable to obtain due to patients neurological status, intubated.     Neuro: [] Headache [] Back pain [] Numbness [] Weakness [] Ataxia [] Dizziness [] Aphasia [] Dysarthria [] Visual disturbance  Resp: [] Shortness of breath/dyspnea, [] Orthopnea [] Cough  CV: [] Chest pain [] Palpitation [] Lightheadedness [] Syncope  Renal: [] Thirst [] Edema  GI: [] Nausea [] Emesis [] Abdominal pain [] Constipation [] Diarrhea  Hem: [] Hematemesis [] bright red blood per rectum  ID: [] Fever [] Chiils [] Dysuria  ENT: [] Rhinorrhea         PHYSICAL EXAM:    Constitutional: Intubated    Neurological: OE to noxious stimulus, PERRL 3mm, +corneals, +cough/gag, overbreathing vent, Not Following commands, UE nothing, LE TF. (bed bound at home per family)                                           Pulmonary: Clear to Auscultation, No rales, No rhonchi, No wheezes   Cardiovascular: S1, S2, Regular rate and rhythm   Gastrointestinal: Soft, Non-tender, Non-distended   Extremities: No calf tenderness       NEUROIMAGING:       MEDICATIONS:  Antibiotics:      Neurological:   acetaminophen    Suspension .. 650 milliGRAM(s) Oral every 6 hours PRN  levETIRAcetam  IVPB 1000 milliGRAM(s) IV Intermittent every 12 hours    Cardiac:     Pulm:    Heme:     Other:   chlorhexidine 0.12% Liquid 15 milliLiter(s) Oral Mucosa every 12 hours  chlorhexidine 4% Liquid 1 Application(s) Topical <User Schedule>  insulin lispro (HumaLOG) corrective regimen sliding scale   SubCutaneous every 6 hours  levothyroxine Injectable 100 MICROGram(s) IV Push at bedtime  pantoprazole  Injectable 40 milliGRAM(s) IV Push daily  polyethylene glycol 3350 17 Gram(s) Oral every 12 hours  predniSONE   Tablet 5 milliGRAM(s) Oral three times a day  senna 2 Tablet(s) Oral at bedtime  simvastatin 20 milliGRAM(s) Oral at bedtime  sodium chloride 0.9% lock flush 10 milliLiter(s) IV Push every 1 hour PRN Pre/post blood products, medications, blood draw, and to maintain line patency  sodium chloride 2% . 1000 milliLiter(s) IV Continuous <Continuous>    DEVICES: [x] ET tube [x] A-line [x] Antoine [x] NGT [x] RT IJ TLC HPI:  65 yo F with AMS since this morning.  Pt. was confused, didn't recognize son this morning.  He notes she was sleeping more than usual yesterday, which tipped off son that something was wrong.  At 11 am pt. was gasping for air like she was seizing. 2 hours ago pt. was dry heaving, her chest was going up and down and she started shaking like she was having a seizure.  Pt. has no history of seizures per son.  		PMH: hypothyroidism, HTN, IDDM, COPD/CHRIS on home O2, RA (on Prednisone), SBO s/p ex lap with lysis of adhesions c/b evisceration,  recent hospitalization in 3/2020 with encephalopathy, significantly hypothyroid (, without myxedema coma), small (stable) right sided SDH, UTI, catatonic reaction to VPA (now resolved) and DKA and psychiatric issues, 2020 hospitalization for GIB, Emphysematous cystitis, endometritis, Sigmoid colitis, morbid obesity, functional quadriplegia.    ADMISSION SCORES: ICH score 2     HCP: Norris Rush, Son [phone]; Daughter, Hevaenly Schmidt [phone]; son asks that only him or his sister, Heavenly be contacted for patient updates/information as they are both proxy's for patient (29 May 2020 23:41)     Overnight events: Tx from [place], pan-cultured s/p hypothermic 35.8, stability CT head, DDAVP given for ASA reversal.     PAST MEDICAL HISTORY: Diverticulosis  Cellulitis  CVA (Cerebral Vascular Accident)  RA (Rheumatoid Arthritis)  Tooth Abscess  Arthritis  Cigarette Smoker  Chronic Asthma  Adult Hypothyroidism  Borderline Diabetes Mellitus  High Cholesterol  H/O: HTN    PAST SURGICAL HISTORY: Wrist Disorder  History of  Section      FAMILY HISTORY:  No pertinent family history in first degree relatives    ALLERGIES: Depakote (Other)  Seroquel (Other (Severe))      ICU Vital Signs Last 24 Hrs  T(C): 36.1 (30 May 2020 05:00), Max: 36.5 (29 May 2020 23:54)  T(F): 97 (30 May 2020 05:00), Max: 97.7 (29 May 2020 23:54)  HR: 63 (30 May 2020 06:00) (46 - 88)  BP: 95/66 (30 May 2020 02:30) (95/66 - 148/87)  BP(mean): 77 (30 May 2020 02:30) (77 - 104)  ABP: 129/71 (30 May 2020 06:00) (95/53 - 130/66)  ABP(mean): 93 (30 May 2020 06:00) (69 - 93)  RR: 14 (30 May 2020 06:00) (14 - 22)  SpO2: 100% (30 May 2020 06:00) (60% - 100%)      @ 07:01  -   @ 06:49  --------------------------------------------------------  IN: 795 mL / OUT: 150 mL / NET: 645 mL                             10.7   17.97 )-----------( 523      ( 30 May 2020 01:30 )             35.7    05-30    140  |  106  |  27<H>  ----------------------------<  213<H>  5.3   |  19<L>  |  0.79    Ca    9.0      30 May 2020 01:30  Phos  4.5     30  Mg     1.9     30    TPro  7.1  /  Alb  2.7<L>  /  TBili  0.3  /  DBili  x   /  AST  34  /  ALT  39  /  AlkPhos  103  05-29   ABG - ( 30 May 2020 01:19 )  pH, Arterial: 7.40  pH, Blood: x     /  pCO2: 38    /  pO2: 193   / HCO3: 24    / Base Excess: -.5   /  SaO2: 100         REVIEW OF SYSTEMS: Unable to obtain due to patients neurological status, intubated.     Neuro: [] Headache [] Back pain [] Numbness [] Weakness [] Ataxia [] Dizziness [] Aphasia [] Dysarthria [] Visual disturbance  Resp: [] Shortness of breath/dyspnea, [] Orthopnea [] Cough  CV: [] Chest pain [] Palpitation [] Lightheadedness [] Syncope  Renal: [] Thirst [] Edema  GI: [] Nausea [] Emesis [] Abdominal pain [] Constipation [] Diarrhea  Hem: [] Hematemesis [] bright red blood per rectum  ID: [] Fever [] Chiils [] Dysuria  ENT: [] Rhinorrhea         PHYSICAL EXAM:    Constitutional: Intubated    Neurological: OE to noxious stimulus, PERRL 3mm, +corneals, +cough/gag, overbreathing vent, Not Following commands, UE nothing, LE TF. (bed bound at home per family)                                           Pulmonary: Clear to Auscultation, No rales, No rhonchi, No wheezes   Cardiovascular: S1, S2, Regular rate and rhythm   Gastrointestinal: Soft, Non-tender, Non-distended   Extremities: No calf tenderness       NEUROIMAGING:   < from: CT Angio Neck w/ IV Cont (20 @ 01:49) >  IMPRESSION:  NONCONTRAST HEAD CT SCAN:  1.  Stable intracranial hemorrhage.  Stable mild hydrocephalus.  2.  Diffuse bilateral white matter hypoattenuation is stable from 2020 but new from 2020.    CT ANGIOGRAPHY NECK:  1.  Patent cervical vasculature.  No hemodynamically significant carotid stenosis or flow-limiting vertebral artery stenosis.  No evidence of dissection.  2.  There is a 7 mm saccular aneurysm arising from the distal cervical left internal carotid artery, just proximal to the skull base.  3.  Bovine aortic arch.    CT ANGIOGRAPHY BRAIN:  1.  There is complete lack of opacification of the dural venous sinuses within the superficial and deep venous systems.  Follow-up CT the and postcontrast head CT scan is recommended to exclude complete venous sinus thrombosis, which may explain the patient's diffuse bilateral white matter hypoattenuation and intracranial hemorrhage  2. no evidence of active contrast extravasation or arteriovenous reformation.  3.  No vessel occlusion or stenosis about the [place].  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  4.  There appears to be a 2 mm intradural aneurysm arising from the proximal supraclinoid left internal carotid artery and a 1-2 mm intradural aneurysm arising from the proximal supraclinoid right internal carotid artery.  5. Possible small extradural aneurysms in the cavernous segments of both internal carotid arteries.    < end of copied text >      MEDICATIONS:  Antibiotics:      Neurological:   acetaminophen    Suspension .. 650 milliGRAM(s) Oral every 6 hours PRN  levETIRAcetam  IVPB 1000 milliGRAM(s) IV Intermittent every 12 hours    Cardiac:     Pulm:    Heme:     Other:   chlorhexidine 0.12% Liquid 15 milliLiter(s) Oral Mucosa every 12 hours  chlorhexidine 4% Liquid 1 Application(s) Topical <User Schedule>  insulin lispro (HumaLOG) corrective regimen sliding scale   SubCutaneous every 6 hours  levothyroxine Injectable 100 MICROGram(s) IV Push at bedtime  pantoprazole  Injectable 40 milliGRAM(s) IV Push daily  polyethylene glycol 3350 17 Gram(s) Oral every 12 hours  predniSONE   Tablet 5 milliGRAM(s) Oral three times a day  senna 2 Tablet(s) Oral at bedtime  simvastatin 20 milliGRAM(s) Oral at bedtime  sodium chloride 0.9% lock flush 10 milliLiter(s) IV Push every 1 hour PRN Pre/post blood products, medications, blood draw, and to maintain line patency  sodium chloride 2% . 1000 milliLiter(s) IV Continuous <Continuous>    DEVICES: [x] ET tube [x] A-line [x] Antoine [x] NGT [x] RT IJ TLC

## 2020-05-30 NOTE — PHYSICAL THERAPY INITIAL EVALUATION ADULT - PERTINENT HX OF CURRENT PROBLEM, REHAB EVAL
Pt is 64F admitted 5/29/20 p/w AMS since this morning, confused, didn't recognize son.  At 11 am, pt gasping for air like she was seizing, dry heaving & shaking.

## 2020-05-30 NOTE — SPEECH LANGUAGE PATHOLOGY EVALUATION - SLP PERTINENT HISTORY OF CURRENT PROBLEM
65 yo F with AMS since this morning.  Pt. was confused, didn't recognize son this morning.  He notes she was sleeping more than usual yesterday, which tipped off son that something was wrong.  At 11 am pt. was gasping for air like she was seizing. 2 hours ago pt. was dry heaving, her chest was going up and down and she started shaking like she was having a seizure.  Pt. has no history of seizures per son.PMH: hypothyroidism, HTN, IDDM, COPD/CHRIS on home O2, RA (on Prednisone), SBO s/p ex lap with lysis of adhesions c/b evisceration,  recent hospitalization in 3/2020 with encephalopathy, significantly hypothyroid. small (stable) right sided SDH, UTI, catatonic reaction to VPA (now resolved) and DKA and psychiatric issues, 5/2020 hospitalization for GIB, Emphysematous cystitis, endometritis, Sigmoid colitis, morbid obesity, functional quadriplegia.

## 2020-05-30 NOTE — PHYSICAL THERAPY INITIAL EVALUATION ADULT - ASR WT BEARING STATUS EVAL
no weight-bearing restrictions/NONCONTRAST HEAD CT SCAN:  Stable intracranial hemorrhage.  Stable mild hydrocephalus. Diffuse bilateral white matter hypoattenuation is stable from 05/29/2020 but new from 02/20/2020.CT ANGIOGRAPHY NECK:Patent cervical vasculature.  No hemodynamically significant carotid stenosis or flow-limiting vertebral artery stenosis.  No evidence of dissection.  There is a 7 mm saccular aneurysm arising from the distal cervical left internal carotid artery, just proximal to the skull base.  Bovine aortic arch.CT ANGIOGRAPHY BRAIN:  There is complete lack of opacification of the dural venous sinuses within the superficial and deep venous systems.  Follow-up CT the and postcontrast head CT scan is recommended to exclude complete venous sinus thrombosis, which may explain the patient's diffuse bilateral white matter hypoattenuation and intracranial hemorrhage no evidence of active contrast extravasation or arteriovenous reformation. No vessel occlusion or stenosis about the [place].  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 There appears to be a 2 mm intradural aneurysm arising from the proximal supraclinoid left internal carotid artery and a 1-2 mm intradural aneurysm arising from the proximal supraclinoid right internal carotid artery. Possible small extradural aneurysms in the cavernous segments of both internal carotid arteries.

## 2020-05-31 LAB
ANION GAP SERPL CALC-SCNC: 12 MMOL/L — SIGNIFICANT CHANGE UP (ref 5–17)
BUN SERPL-MCNC: 14 MG/DL — SIGNIFICANT CHANGE UP (ref 7–23)
CALCIUM SERPL-MCNC: 9.3 MG/DL — SIGNIFICANT CHANGE UP (ref 8.4–10.5)
CHLORIDE SERPL-SCNC: 115 MMOL/L — HIGH (ref 96–108)
CO2 SERPL-SCNC: 21 MMOL/L — LOW (ref 22–31)
CREAT SERPL-MCNC: 0.61 MG/DL — SIGNIFICANT CHANGE UP (ref 0.5–1.3)
CULTURE RESULTS: SIGNIFICANT CHANGE UP
GLUCOSE BLDC GLUCOMTR-MCNC: 137 MG/DL — HIGH (ref 70–99)
GLUCOSE BLDC GLUCOMTR-MCNC: 141 MG/DL — HIGH (ref 70–99)
GLUCOSE BLDC GLUCOMTR-MCNC: 145 MG/DL — HIGH (ref 70–99)
GLUCOSE BLDC GLUCOMTR-MCNC: 145 MG/DL — HIGH (ref 70–99)
GLUCOSE BLDC GLUCOMTR-MCNC: 149 MG/DL — HIGH (ref 70–99)
GLUCOSE BLDC GLUCOMTR-MCNC: 153 MG/DL — HIGH (ref 70–99)
GLUCOSE BLDC GLUCOMTR-MCNC: 164 MG/DL — HIGH (ref 70–99)
GLUCOSE BLDC GLUCOMTR-MCNC: 180 MG/DL — HIGH (ref 70–99)
GLUCOSE BLDC GLUCOMTR-MCNC: 182 MG/DL — HIGH (ref 70–99)
GLUCOSE BLDC GLUCOMTR-MCNC: 198 MG/DL — HIGH (ref 70–99)
GLUCOSE BLDC GLUCOMTR-MCNC: 208 MG/DL — HIGH (ref 70–99)
GLUCOSE BLDC GLUCOMTR-MCNC: 229 MG/DL — HIGH (ref 70–99)
GLUCOSE BLDC GLUCOMTR-MCNC: 230 MG/DL — HIGH (ref 70–99)
GLUCOSE BLDC GLUCOMTR-MCNC: 243 MG/DL — HIGH (ref 70–99)
GLUCOSE BLDC GLUCOMTR-MCNC: 250 MG/DL — HIGH (ref 70–99)
GLUCOSE SERPL-MCNC: 231 MG/DL — HIGH (ref 70–99)
POTASSIUM SERPL-MCNC: 4 MMOL/L — SIGNIFICANT CHANGE UP (ref 3.5–5.3)
POTASSIUM SERPL-SCNC: 4 MMOL/L — SIGNIFICANT CHANGE UP (ref 3.5–5.3)
SODIUM SERPL-SCNC: 148 MMOL/L — HIGH (ref 135–145)
SPECIMEN SOURCE: SIGNIFICANT CHANGE UP

## 2020-05-31 PROCEDURE — 99292 CRITICAL CARE ADDL 30 MIN: CPT

## 2020-05-31 PROCEDURE — 95720 EEG PHY/QHP EA INCR W/VEEG: CPT

## 2020-05-31 PROCEDURE — 71045 X-RAY EXAM CHEST 1 VIEW: CPT | Mod: 26,76

## 2020-05-31 PROCEDURE — 99291 CRITICAL CARE FIRST HOUR: CPT

## 2020-05-31 RX ORDER — INSULIN HUMAN 100 [IU]/ML
3 INJECTION, SOLUTION SUBCUTANEOUS
Qty: 100 | Refills: 0 | Status: DISCONTINUED | OUTPATIENT
Start: 2020-05-31 | End: 2020-06-01

## 2020-05-31 RX ORDER — AMLODIPINE BESYLATE 2.5 MG/1
10 TABLET ORAL DAILY
Refills: 0 | Status: DISCONTINUED | OUTPATIENT
Start: 2020-05-31 | End: 2020-06-15

## 2020-05-31 RX ADMIN — Medication 12.5 MILLIGRAM(S): at 05:40

## 2020-05-31 RX ADMIN — Medication 12.5 MILLIGRAM(S): at 17:17

## 2020-05-31 RX ADMIN — Medication 5 MILLIGRAM(S): at 14:19

## 2020-05-31 RX ADMIN — CHLORHEXIDINE GLUCONATE 15 MILLILITER(S): 213 SOLUTION TOPICAL at 17:17

## 2020-05-31 RX ADMIN — INSULIN HUMAN 3 UNIT(S)/HR: 100 INJECTION, SOLUTION SUBCUTANEOUS at 19:00

## 2020-05-31 RX ADMIN — POLYETHYLENE GLYCOL 3350 17 GRAM(S): 17 POWDER, FOR SOLUTION ORAL at 17:17

## 2020-05-31 RX ADMIN — SIMVASTATIN 20 MILLIGRAM(S): 20 TABLET, FILM COATED ORAL at 21:50

## 2020-05-31 RX ADMIN — LEVETIRACETAM 400 MILLIGRAM(S): 250 TABLET, FILM COATED ORAL at 05:40

## 2020-05-31 RX ADMIN — Medication 100 MICROGRAM(S): at 21:49

## 2020-05-31 RX ADMIN — LEVETIRACETAM 400 MILLIGRAM(S): 250 TABLET, FILM COATED ORAL at 17:17

## 2020-05-31 RX ADMIN — AMLODIPINE BESYLATE 10 MILLIGRAM(S): 2.5 TABLET ORAL at 10:15

## 2020-05-31 RX ADMIN — CHLORHEXIDINE GLUCONATE 15 MILLILITER(S): 213 SOLUTION TOPICAL at 05:40

## 2020-05-31 RX ADMIN — POLYETHYLENE GLYCOL 3350 17 GRAM(S): 17 POWDER, FOR SOLUTION ORAL at 05:41

## 2020-05-31 RX ADMIN — CHLORHEXIDINE GLUCONATE 1 APPLICATION(S): 213 SOLUTION TOPICAL at 21:49

## 2020-05-31 RX ADMIN — SENNA PLUS 2 TABLET(S): 8.6 TABLET ORAL at 21:49

## 2020-05-31 RX ADMIN — PANTOPRAZOLE SODIUM 40 MILLIGRAM(S): 20 TABLET, DELAYED RELEASE ORAL at 11:10

## 2020-05-31 RX ADMIN — FENTANYL CITRATE 25 MICROGRAM(S): 50 INJECTION INTRAVENOUS at 00:50

## 2020-05-31 RX ADMIN — CEFTRIAXONE 100 MILLIGRAM(S): 500 INJECTION, POWDER, FOR SOLUTION INTRAMUSCULAR; INTRAVENOUS at 14:19

## 2020-05-31 RX ADMIN — Medication 5 MILLIGRAM(S): at 05:40

## 2020-05-31 RX ADMIN — Medication 5 MILLIGRAM(S): at 21:50

## 2020-05-31 RX ADMIN — Medication 4: at 05:41

## 2020-05-31 NOTE — PROGRESS NOTE ADULT - SUBJECTIVE AND OBJECTIVE BOX
NSCU ATTENDING -- ADDITIONAL PROGRESS NOTE    Nighttime rounds were performed -- please refer to earlier Progress Note for HPI details.    T(C): 37.8 (05-31-20 @ 19:00), Max: 37.8 (05-31-20 @ 19:00)  HR: 76 (05-31-20 @ 21:14) (63 - 109)  BP: --  RR: 19 (05-31-20 @ 19:00) (14 - 35)  SpO2: 100% (05-31-20 @ 21:14) (98% - 100%)  Wt(kg): --    Relevant labwork and imaging reviewed.    Patient remains critically ill.     Now on insulin GTT for glucose control.  Cardene GTT off.  Remains on cEEG -- on my bedside assessment, no epileptiform abnormalities.  Follow up CT brain in AM, hold off on chemoprophylaxis.      Additional 30 minutes of critical care time.

## 2020-05-31 NOTE — PROGRESS NOTE ADULT - SUBJECTIVE AND OBJECTIVE BOX
HPI:  65 yo F with AMS since this morning.  Pt. was confused, didn't recognize son this morning.  He notes she was sleeping more than usual yesterday, which tipped off son that something was wrong.  At 11 am pt. was gasping for air like she was seizing. 2 hours ago pt. was dry heaving, her chest was going up and down and she started shaking like she was having a seizure.  Pt. has no history of seizures per son.  		PMH: hypothyroidism, HTN, IDDM, COPD/CHRIS on home O2, RA (on Prednisone), SBO s/p ex lap with lysis of adhesions c/b evisceration,  recent hospitalization in 3/2020 with encephalopathy, significantly hypothyroid (, without myxedema coma), small (stable) right sided SDH, UTI, catatonic reaction to VPA (now resolved) and DKA and psychiatric issues, 2020 hospitalization for GIB, Emphysematous cystitis, endometritis, Sigmoid colitis, morbid obesity, functional quadriplegia    HCP: Norris Rush, Son [phone]; Daughter, Heavenly Schmidt [phone]; son asks that only him or his sister, Heavenly be contacted for patient updates/information as they are both proxy's for patient (29 May 2020 23:41)    ADMISSION SCORES: ICH 2    SURGERY: None    OVERNIGHT EVENTS: Placed on vEEG    PAST MEDICAL HISTORY: Diverticulosis  Cellulitis  CVA (Cerebral Vascular Accident)  RA (Rheumatoid Arthritis)  Tooth Abscess  Arthritis  Cigarette Smoker  Chronic Asthma  Adult Hypothyroidism  Borderline Diabetes Mellitus  High Cholesterol  H/O: HTN    PAST SURGICAL HISTORY: Wrist Disorder  History of  Section      ALLERGIES: Depakote (Other)  Seroquel (Other (Severe))      FAMILY HISTORY:  No pertinent family history in first degree relatives        ICU Vital Signs Last 24 Hrs  T(C): 37.7 (31 May 2020 03:00), Max: 37.7 (31 May 2020 03:00)  T(F): 99.9 (31 May 2020 03:00), Max: 99.9 (31 May 2020 03:00)  HR: 63 (31 May 2020 06:15) (63 - 105)  BP: --  BP(mean): --  ABP: 159/70 (31 May 2020 06:15) (123/58 - 172/75)  ABP(mean): 101 (31 May 2020 06:15) (81 - 112)  RR: 16 (31 May 2020 06:15) (0 - 24)  SpO2: 100% (31 May 2020 06:15) (96% - 100%)      @ 07:01  -   @ 07:00  --------------------------------------------------------  IN: 795 mL / OUT: 150 mL / NET: 645 mL     @ 07:  -   @ 06:44  --------------------------------------------------------  IN: 1857.5 mL / OUT: 2185 mL / NET: -327.5 mL                             9.1    19.80 )-----------( 471      ( 30 May 2020 21:20 )             30.5    0531    148<H>  |  115<H>  |  14  ----------------------------<  231<H>  4.0   |  21<L>  |  0.61    Ca    9.3      31 May 2020 03:03  Phos  2.8       Mg     1.8         TPro  7.1  /  Alb  2.7<L>  /  TBili  0.3  /  DBili  x   /  AST  34  /  ALT  39  /  AlkPhos  103  0529   ABG - ( 30 May 2020 01:19 )  pH, Arterial: 7.40  pH, Blood: x     /  pCO2: 38    /  pO2: 193   / HCO3: 24    / Base Excess: -.5   /  SaO2: 100         REVIEW OF SYSTEMS: Unable to obtain due to neurologically condition  Neuro: [] Headache [] Back pain [] Numbness [] Weakness [] Ataxia [] Dizziness [] Aphasia [] Dysarthria [] Visual disturbance  Resp: [] Shortness of breath/dyspnea, [] Orthopnea [] Cough  CV: [] Chest pain [] Palpitation [] Lightheadedness [] Syncope  Renal: [] Thirst [] Edema  GI: [] Nausea [] Emesis [] Abdominal pain [] Constipation [] Diarrhea  Hem: [] Hematemesis [] bright red blood per rectum  ID: [] Fever [] Chiils [] Dysuria  ENT: [] Rhinorrhea     PHYSICAL EXAM:    Constitutional: Intubated     Neurological: AOx3, Following Commands, Moving all Extremities     Motor exam:          Upper extremity                         Delt     Bicep     Tricep    HG                                                 R         5/5        5/5        5/5       5/5                                               L          5/5        5/5        5/5       5/5          Lower extremity                        HF         KF        KE       DF         PF                                                  R        5/5        5/5        5/5       5/5         5/5                                               L         5/5        5/5       5/5       5/5          5/5                                                 Sensation: [] intact to light touch  [] decreased:     Pulmonary: Clear to Auscultation, No rales, No rhonchi, No wheezes     Cardiovascular: S1, S2, Regular rate and rhythm     Gastrointestinal: Soft, Non-tender, Non-distended     Extremities: No calf tenderness     Incision:     NEUROIMAGING:     < from: CT Angio Head w/ IV Cont (20 @ 11:15) >  IMPRESSION: Noncontrast head CT appears unchanged.    CT venogram demonstrates no evidence of venous sinus thrombosis.    < end of copied text >      EEG REPORT:         MEDICATIONS:  Antibiotics:   cefTRIAXone   IVPB 1000 milliGRAM(s) IV Intermittent every 24 hours     Neurological:   acetaminophen    Suspension .. 650 milliGRAM(s) Oral every 6 hours PRN  fentaNYL    Injectable 25 MICROGram(s) IV Push every 2 hours PRN  levETIRAcetam  IVPB 1000 milliGRAM(s) IV Intermittent every 12 hours    Cardiac:   metoprolol tartrate 12.5 milliGRAM(s) Oral two times a day  niCARdipine Infusion 5 mG/Hr IV Continuous <Continuous>    Pulm:    Heme:     Other:   chlorhexidine 0.12% Liquid 15 milliLiter(s) Oral Mucosa every 12 hours  chlorhexidine 4% Liquid 1 Application(s) Topical <User Schedule>  insulin lispro (HumaLOG) corrective regimen sliding scale   SubCutaneous every 6 hours  levothyroxine Injectable 100 MICROGram(s) IV Push at bedtime  pantoprazole  Injectable 40 milliGRAM(s) IV Push daily  polyethylene glycol 3350 17 Gram(s) Oral every 12 hours  predniSONE   Tablet 5 milliGRAM(s) Oral three times a day  senna 2 Tablet(s) Oral at bedtime  simvastatin 20 milliGRAM(s) Oral at bedtime  sodium chloride 0.9% lock flush 10 milliLiter(s) IV Push every 1 hour PRN Pre/post blood products, medications, blood draw, and to maintain line patency       DEVICES: [] Restraints [] MABEL/HMV []LD [x] ET tube [] Trach [] Chest Tube [x] A-line [x] Antoine [x] NGT [] Rectal Tube     Mode: AC/ CMV (Assist Control/ Continuous Mandatory Ventilation)  RR (machine): 14  TV (machine): 450  FiO2: 30  PEEP: 5  ITime: 1  MAP: 11  PIP: 32 HPI:  63 yo F with AMS since this morning.  Pt. was confused, didn't recognize son this morning.  He notes she was sleeping more than usual yesterday, which tipped off son that something was wrong.  At 11 am pt. was gasping for air like she was seizing. 2 hours ago pt. was dry heaving, her chest was going up and down and she started shaking like she was having a seizure.  Pt. has no history of seizures per son.  		PMH: hypothyroidism, HTN, IDDM, COPD/CHRIS on home O2, RA (on Prednisone), SBO s/p ex lap with lysis of adhesions c/b evisceration,  recent hospitalization in 3/2020 with encephalopathy, significantly hypothyroid (, without myxedema coma), small (stable) right sided SDH, UTI, catatonic reaction to VPA (now resolved) and DKA and psychiatric issues, 2020 hospitalization for GIB, Emphysematous cystitis, endometritis, Sigmoid colitis, morbid obesity, functional quadriplegia    HCP: Norris Rush, Son [phone]; Daughter, Heavenly Schmidt [phone]; son asks that only him or his sister, Heavenly be contacted for patient updates/information as they are both proxy's for patient (29 May 2020 23:41)    ADMISSION SCORES: ICH 2    SURGERY: None    OVERNIGHT EVENTS: Placed on vEEG    PAST MEDICAL HISTORY: Diverticulosis  Cellulitis  CVA (Cerebral Vascular Accident)  RA (Rheumatoid Arthritis)  Tooth Abscess  Arthritis  Cigarette Smoker  Chronic Asthma  Adult Hypothyroidism  Borderline Diabetes Mellitus  High Cholesterol  H/O: HTN    PAST SURGICAL HISTORY: Wrist Disorder  History of  Section      ALLERGIES: Depakote (Other)  Seroquel (Other (Severe))      FAMILY HISTORY:  No pertinent family history in first degree relatives        ICU Vital Signs Last 24 Hrs  T(C): 37.7 (31 May 2020 03:00), Max: 37.7 (31 May 2020 03:00)  T(F): 99.9 (31 May 2020 03:00), Max: 99.9 (31 May 2020 03:00)  HR: 63 (31 May 2020 06:15) (63 - 105)  BP: --  BP(mean): --  ABP: 159/70 (31 May 2020 06:15) (123/58 - 172/75)  ABP(mean): 101 (31 May 2020 06:15) (81 - 112)  RR: 16 (31 May 2020 06:15) (0 - 24)  SpO2: 100% (31 May 2020 06:15) (96% - 100%)      @ 07:01  -   @ 07:00  --------------------------------------------------------  IN: 795 mL / OUT: 150 mL / NET: 645 mL     @ 07:  -   @ 06:44  --------------------------------------------------------  IN: 1857.5 mL / OUT: 2185 mL / NET: -327.5 mL                             9.1    19.80 )-----------( 471      ( 30 May 2020 21:20 )             30.5    0531    148<H>  |  115<H>  |  14  ----------------------------<  231<H>  4.0   |  21<L>  |  0.61    Ca    9.3      31 May 2020 03:03  Phos  2.8       Mg     1.8         TPro  7.1  /  Alb  2.7<L>  /  TBili  0.3  /  DBili  x   /  AST  34  /  ALT  39  /  AlkPhos  103  0529   ABG - ( 30 May 2020 01:19 )  pH, Arterial: 7.40  pH, Blood: x     /  pCO2: 38    /  pO2: 193   / HCO3: 24    / Base Excess: -.5   /  SaO2: 100         REVIEW OF SYSTEMS: Unable to obtain due to neurologically condition  Neuro: [] Headache [] Back pain [] Numbness [] Weakness [] Ataxia [] Dizziness [] Aphasia [] Dysarthria [] Visual disturbance  Resp: [] Shortness of breath/dyspnea, [] Orthopnea [] Cough  CV: [] Chest pain [] Palpitation [] Lightheadedness [] Syncope  Renal: [] Thirst [] Edema  GI: [] Nausea [] Emesis [] Abdominal pain [] Constipation [] Diarrhea  Hem: [] Hematemesis [] bright red blood per rectum  ID: [] Fever [] Chiils [] Dysuria  ENT: [] Rhinorrhea     PHYSICAL EXAM:    Constitutional: Intubated     Neurological: intubated, EO to nox, Pupils 3mm R, +corneals, +cough/gag, overbreathing, no FC, RUE nothing, RLE TF, LUE Spon, LLE WD     Pulmonary: Clear to Auscultation, No rales, No rhonchi, No wheezes     Cardiovascular: S1, S2, Regular rate and rhythm     Gastrointestinal: Soft, Non-tender, Non-distended     Extremities: No calf tenderness     Incision:     NEUROIMAGING:     < from: CT Angio Head w/ IV Cont (20 @ 11:15) >  IMPRESSION: Noncontrast head CT appears unchanged.    CT venogram demonstrates no evidence of venous sinus thrombosis.    < end of copied text >      EEG REPORT:         MEDICATIONS:  Antibiotics:   cefTRIAXone   IVPB 1000 milliGRAM(s) IV Intermittent every 24 hours     Neurological:   acetaminophen    Suspension .. 650 milliGRAM(s) Oral every 6 hours PRN  fentaNYL    Injectable 25 MICROGram(s) IV Push every 2 hours PRN  levETIRAcetam  IVPB 1000 milliGRAM(s) IV Intermittent every 12 hours    Cardiac:   metoprolol tartrate 12.5 milliGRAM(s) Oral two times a day  niCARdipine Infusion 5 mG/Hr IV Continuous <Continuous>    Pulm:    Heme:     Other:   chlorhexidine 0.12% Liquid 15 milliLiter(s) Oral Mucosa every 12 hours  chlorhexidine 4% Liquid 1 Application(s) Topical <User Schedule>  insulin lispro (HumaLOG) corrective regimen sliding scale   SubCutaneous every 6 hours  levothyroxine Injectable 100 MICROGram(s) IV Push at bedtime  pantoprazole  Injectable 40 milliGRAM(s) IV Push daily  polyethylene glycol 3350 17 Gram(s) Oral every 12 hours  predniSONE   Tablet 5 milliGRAM(s) Oral three times a day  senna 2 Tablet(s) Oral at bedtime  simvastatin 20 milliGRAM(s) Oral at bedtime  sodium chloride 0.9% lock flush 10 milliLiter(s) IV Push every 1 hour PRN Pre/post blood products, medications, blood draw, and to maintain line patency       DEVICES: [] Restraints [] MABEL/HMV []LD [x] ET tube [] Trach [] Chest Tube [x] A-line [x] Antoine [x] NGT [] Rectal Tube     Mode: AC/ CMV (Assist Control/ Continuous Mandatory Ventilation)  RR (machine): 14  TV (machine): 450  FiO2: 30  PEEP: 5  ITime: 1  MAP: 11  PIP: 32

## 2020-05-31 NOTE — PROGRESS NOTE ADULT - ASSESSMENT
65 yo F with AMS since this morning, CT demonstrated RT frontal ICH with IVH. Questionable seizure, intubated at [facility] and transferred to [facility] for management.     PLAN:   Neuro:  -Q1 neuro checks  -CT V negative  - VEEG in progress - no seizure noted overnight  -keppra for ?seizure on presentation  -Sedation with precedex if needed  - tylenol prn for pain control  - activity: bedrest  -PT/OT/S&S ordered      Pulm:  -intubated: Mode: AC/ CMV (Assist Control/ Continuous Mandatory Ventilation)  RR (machine): 14  TV (machine): 450  FiO2: 30  PEEP: 5  ITime: 1  MAP: 11  PIP: 32  -Peridex for oral hygiene      CV:  --140  -TTE negative for PFO  -continue home dose zocor    GI:  -start feeds, glucerna  -GI ppx: protonix  -Bowel regimen: senna, miralax    Renal:  -Na goal 145-155  - 2% held, , rapid increase, continue BMP Q 6    Heme/Onc:  -Hold chemo ppx due to ICH  -SCDS for dvt ppx  -LED dopplers done      Endo:  - HISS for hyperglycemia, add NPH if feeds started.  - continue synthroid for hypothyroidism  -continue prednisone 5mg TID for RA    ID:  - Cont ceftriaxone for UTI, fu culture for speciation  -monitor for fevers  -follow-up panCx for hypothermia    CODE STATUS:  [x] Full Code [] DNR [] DNI [] Palliative/Comfort Care    DISPOSITION:  [x] ICU    [x] Patient is at high risk of neurologic deterioration/death due to: ICH, brain herniation    Time spent:  45 [x] critical care minutes 65 yo F with AMS since this morning, CT demonstrated RT frontal ICH with IVH. Questionable seizure, intubated at [facility] and transferred to [facility] for management.     PLAN:   Neuro:  -Q1 neuro checks  -CT V negative  - VEEG in progress - no seizure noted overnight  -keppra for ?seizure on presentation  -Sedation with precedex if needed  - tylenol prn for pain control  - activity: bedrest  -PT/OT/S&S ordered      Pulm:  -intubated: Mode: AC/ CMV (Assist Control/ Continuous Mandatory Ventilation)  RR (machine): 14  TV (machine): 450  FiO2: 30  PEEP: 5  ITime: 1  MAP: 11  PIP: 32  -Peridex for oral hygiene      CV:  --140  -TTE negative for PFO  -add norvasc 10 and wean off cardene  -continue home dose zocor    GI:  -start feeds, glucerna  -GI ppx: protonix  -Bowel regimen: senna, miralax    Renal:  -Na goal 145-155  - 2% held, , rapid increase, continue BMP Q 6    Heme/Onc:  -Hold chemo ppx due to ICH  -SCDS for dvt ppx  -LED dopplers done      Endo:  -  Insulin GTT for control  - continue synthroid for hypothyroidism  -continue prednisone 5mg TID for RA    ID:  - Cont ceftriaxone for UTI, fu culture for speciation  -monitor for fevers  -follow-up panCx for hypothermia    CODE STATUS:  [x] Full Code [] DNR [] DNI [] Palliative/Comfort Care    DISPOSITION:  [x] ICU    [x] Patient is at high risk of neurologic deterioration/death due to: ICH, brain herniation    Time spent:  45 [x] critical care minutes

## 2020-05-31 NOTE — PROGRESS NOTE ADULT - SUBJECTIVE AND OBJECTIVE BOX
Patient seen and examined at bedside. CTV negative for venous sinus thrombosis    --Anticoagulation--    T(C): 37.5 (05-30-20 @ 23:00), Max: 37.5 (05-30-20 @ 23:00)  HR: 73 (05-31-20 @ 00:00) (46 - 105)  BP: 95/66 (05-30-20 @ 02:30) (95/66 - 120/84)  RR: 16 (05-31-20 @ 00:00) (0 - 24)  SpO2: 100% (05-31-20 @ 00:00) (96% - 100%)  Wt(kg): --    Exam: Intubated, opening eyes to stimulation. PERRL. +c/c/g. TF in lower extremities, no motor response in upper extremities

## 2020-05-31 NOTE — EEG REPORT - NS EEG TEXT BOX
[facility]   COMPREHENSIVE EPILEPSY CENTER   REPORT OF CONTINUOUS VIDEO EEG     [facility]: [address], [place] [address], Ph#: [phone]  [facility]: [id number], [place] [address], Ph#: [phone]  [facility]: [address] [place] [address], Ph#: [phone]    Patient Name: AFTAB BASS  Age and : 64y ([birth year])  MRN #: [id number]  Location: [place] [address]  Referring Physician: Kimo Thomason    Start Time/Date: 16:25 on 2020   End Time: 08:00 on 2020  Duration: 10h 35min    _____________________________________________________________  STUDY INFORMATION    EEG Recording Technique:  The patient underwent continuous Video-EEG monitoring, using Telemetry System hardware on the XLTek Digital System. EEG and video data were stored on a computer hard drive with important events saved in digital archive files. The material was reviewed by a physician (electroencephalographer / epileptologist) on a daily basis. Moy and seizure detection algorithms were utilized and reviewed. An EEG Technician attended to the patient, and was available throughout daytime work hours.  The epilepsy center neurologist was available in person or on call 24-hours per day.    EEG Placement and Labeling of Electrodes:  The EEG was performed utilizing 20 channel referential EEG connections (coronal over temporal over parasagittal montage) using all standard 10-20 electrode placements with EKG, with additional electrodes placed in the inferior temporal region using the modified 10-10 montage electrode placements for elective admissions, or if deemed necessary. Recording was at a sampling rate of 256 samples per second per channel. Time synchronized digital video recording was done simultaneously with EEG recording. A low light infrared camera was used for low light recording.     _____________________________________________________________  HISTORY    Patient is a 64y old  Female who presents with a chief complaint of ICH, SAH w/ IVH (31 May 2020 06:43)      PERTINENT MEDICATION:  levETIRAcetam  IVPB 1000 milliGRAM(s) IV Intermittent every 12 hours    _____________________________________________________________  STUDY INTERPRETATION    Findings: The background was spontaneously variable, poorly reactive, and near-continuous, with occasional periods of intermittent 1s of diffuse suppression. No posterior dominant rhythm seen.    Background Slowing:  Diffuse theta and polymorphic delta slowing.    Focal Slowing:   None were present.    Sleep Background:  Stage II sleep transients were not recorded.    Other Non-Epileptiform Findings:  None were present.    Interictal Epileptiform Activity:   Frequent brief runs of 0.5-1Hz generalized periodic discharges, maximal in the left and right frontotemporal regions, with shifting laterality.     Events:  Clinical events: None recorded.  Seizures: None recorded.    Activation Procedures:   Hyperventilation was not performed.    Photic stimulation was not performed.     Artifacts:  Intermittent myogenic and movement artifacts were noted.    ECG:  The heart rate on single channel ECG was predominantly between 60-80 BPM.    _____________________________________________________________  EEG SUMMARY/CLASSIFICATION    Abnormal EEG in an altered patient.  - Frequent brief runs of 0.5-1Hz generalized periodic discharges, maximal in the left and right frontotemporal regions, with shifting laterality.   - Moderate to severe generalized slowing.    _____________________________________________________________  EEG IMPRESSION/CLINICAL CORRELATE    Abnormal EEG study.  Diffuse cortical hyperexcitability, maximal in the bilateral temporal regions.   Moderate to severe nonspecific diffuse or multifocal cerebral dysfunction.   No seizure seen.  _____________________________________________________________    Preliminary Fellow Interpretation:     Sia Peters, MD  Epilepsy Fellow [facility]   COMPREHENSIVE EPILEPSY CENTER   REPORT OF CONTINUOUS VIDEO EEG     [facility]: [address], [place] [address], Ph#: [phone]  [facility]: [id number], [place] [address], Ph#: [phone]  [facility]: [address] [place] [address], Ph#: [phone]    Patient Name: AFTAB BASS  Age and : 64y ([birth year])  MRN #: [id number]  Location: [place] [address]  Referring Physician: Kimo Thomason    Start Time/Date: 16:25 on 2020   End Time: 08:00 on 2020  Duration: 10h 35min    _____________________________________________________________  STUDY INFORMATION    EEG Recording Technique:  The patient underwent continuous Video-EEG monitoring, using Telemetry System hardware on the XLTek Digital System. EEG and video data were stored on a computer hard drive with important events saved in digital archive files. The material was reviewed by a physician (electroencephalographer / epileptologist) on a daily basis. Moy and seizure detection algorithms were utilized and reviewed. An EEG Technician attended to the patient, and was available throughout daytime work hours.  The epilepsy center neurologist was available in person or on call 24-hours per day.    EEG Placement and Labeling of Electrodes:  The EEG was performed utilizing 20 channel referential EEG connections (coronal over temporal over parasagittal montage) using all standard 10-20 electrode placements with EKG, with additional electrodes placed in the inferior temporal region using the modified 10-10 montage electrode placements for elective admissions, or if deemed necessary. Recording was at a sampling rate of 256 samples per second per channel. Time synchronized digital video recording was done simultaneously with EEG recording. A low light infrared camera was used for low light recording.     _____________________________________________________________  HISTORY    Patient is a 64y old  Female who presents with a chief complaint of ICH, SAH w/ IVH (31 May 2020 06:43)      PERTINENT MEDICATION:  levETIRAcetam  IVPB 1000 milliGRAM(s) IV Intermittent every 12 hours    _____________________________________________________________  STUDY INTERPRETATION    Findings: The background was spontaneously variable, poorly reactive, and near-continuous, with occasional periods of intermittent 1s of diffuse suppression. No posterior dominant rhythm seen.    Background Slowing:  Diffuse theta and polymorphic delta slowing.    Focal Slowing:   None were present.    Sleep Background:  Stage II sleep transients were not recorded.    Other Non-Epileptiform Findings:  None were present.    Interictal Epileptiform Activity:   Frequent brief runs of 0.5-1Hz generalized periodic discharges, maximal in the left and right frontotemporal regions, with shifting laterality.     Events:  Clinical events: None recorded.  Seizures: None recorded.    Activation Procedures:   Hyperventilation was not performed.    Photic stimulation was not performed.     Artifacts:  Intermittent myogenic and movement artifacts were noted.    ECG:  The heart rate on single channel ECG was predominantly between 60-80 BPM.    _____________________________________________________________  EEG SUMMARY/CLASSIFICATION    Abnormal EEG in an altered patient.  - Frequent brief runs of 0.5-1Hz generalized periodic discharges, maximal in the left and right frontotemporal regions, with shifting laterality.   - Moderate to severe generalized slowing.    _____________________________________________________________  EEG IMPRESSION/CLINICAL CORRELATE    Abnormal EEG study.  Diffuse cortical hyperexcitability, maximal in the bilateral temporal regions.   Moderate to severe nonspecific diffuse or multifocal cerebral dysfunction.   No seizure seen.  _____________________________________________________________    Vikki Proteasa MD  Epilepsy Attending    Sia Lorraine, MD  Epilepsy Fellow

## 2020-05-31 NOTE — PROGRESS NOTE ADULT - ASSESSMENT
AD - Loren Morrow  64F w/ extensive medical hx and functionally bed bound at baseline and morbidly obese direct to NSCU intubated transfer for possib seizure activity at home found to have L IPH w/ IVH and SAH extension, CTA grossly neg, stable on repeat scan, on exam interm EO to noxious, held versed drip on arrival, flaccid x4 with + brain stem reflexes    - CTV demonstrates no evidence of venous sinus thrombosis  - Continue supportive care per NSCU  - Neuro checks per unit protocol  - SBP <160  - Fever w/u per ICU team including LE duplex (neg)

## 2020-06-01 LAB
ANION GAP SERPL CALC-SCNC: 12 MMOL/L — SIGNIFICANT CHANGE UP (ref 5–17)
ANION GAP SERPL CALC-SCNC: 16 MMOL/L — SIGNIFICANT CHANGE UP (ref 5–17)
BUN SERPL-MCNC: 12 MG/DL — SIGNIFICANT CHANGE UP (ref 7–23)
BUN SERPL-MCNC: 14 MG/DL — SIGNIFICANT CHANGE UP (ref 7–23)
CALCIUM SERPL-MCNC: 10.1 MG/DL — SIGNIFICANT CHANGE UP (ref 8.4–10.5)
CALCIUM SERPL-MCNC: 9.7 MG/DL — SIGNIFICANT CHANGE UP (ref 8.4–10.5)
CHLORIDE SERPL-SCNC: 107 MMOL/L — SIGNIFICANT CHANGE UP (ref 96–108)
CHLORIDE SERPL-SCNC: 112 MMOL/L — HIGH (ref 96–108)
CO2 SERPL-SCNC: 20 MMOL/L — LOW (ref 22–31)
CO2 SERPL-SCNC: 22 MMOL/L — SIGNIFICANT CHANGE UP (ref 22–31)
CREAT SERPL-MCNC: 0.63 MG/DL — SIGNIFICANT CHANGE UP (ref 0.5–1.3)
CREAT SERPL-MCNC: 0.65 MG/DL — SIGNIFICANT CHANGE UP (ref 0.5–1.3)
GAS PNL BLDA: SIGNIFICANT CHANGE UP
GLUCOSE BLDC GLUCOMTR-MCNC: 128 MG/DL — HIGH (ref 70–99)
GLUCOSE BLDC GLUCOMTR-MCNC: 137 MG/DL — HIGH (ref 70–99)
GLUCOSE BLDC GLUCOMTR-MCNC: 138 MG/DL — HIGH (ref 70–99)
GLUCOSE BLDC GLUCOMTR-MCNC: 141 MG/DL — HIGH (ref 70–99)
GLUCOSE BLDC GLUCOMTR-MCNC: 142 MG/DL — HIGH (ref 70–99)
GLUCOSE BLDC GLUCOMTR-MCNC: 150 MG/DL — HIGH (ref 70–99)
GLUCOSE BLDC GLUCOMTR-MCNC: 151 MG/DL — HIGH (ref 70–99)
GLUCOSE BLDC GLUCOMTR-MCNC: 154 MG/DL — HIGH (ref 70–99)
GLUCOSE BLDC GLUCOMTR-MCNC: 166 MG/DL — HIGH (ref 70–99)
GLUCOSE BLDC GLUCOMTR-MCNC: 168 MG/DL — HIGH (ref 70–99)
GLUCOSE BLDC GLUCOMTR-MCNC: 179 MG/DL — HIGH (ref 70–99)
GLUCOSE BLDC GLUCOMTR-MCNC: 182 MG/DL — HIGH (ref 70–99)
GLUCOSE BLDC GLUCOMTR-MCNC: 200 MG/DL — HIGH (ref 70–99)
GLUCOSE BLDC GLUCOMTR-MCNC: 201 MG/DL — HIGH (ref 70–99)
GLUCOSE BLDC GLUCOMTR-MCNC: 257 MG/DL — HIGH (ref 70–99)
GLUCOSE BLDC GLUCOMTR-MCNC: 297 MG/DL — HIGH (ref 70–99)
GLUCOSE SERPL-MCNC: 158 MG/DL — HIGH (ref 70–99)
GLUCOSE SERPL-MCNC: 222 MG/DL — HIGH (ref 70–99)
HCT VFR BLD CALC: 26.9 % — LOW (ref 34.5–45)
HGB BLD-MCNC: 8.2 G/DL — LOW (ref 11.5–15.5)
MAGNESIUM SERPL-MCNC: 2 MG/DL — SIGNIFICANT CHANGE UP (ref 1.6–2.6)
MAGNESIUM SERPL-MCNC: 2 MG/DL — SIGNIFICANT CHANGE UP (ref 1.6–2.6)
MCHC RBC-ENTMCNC: 29 PG — SIGNIFICANT CHANGE UP (ref 27–34)
MCHC RBC-ENTMCNC: 30.5 GM/DL — LOW (ref 32–36)
MCV RBC AUTO: 95.1 FL — SIGNIFICANT CHANGE UP (ref 80–100)
NRBC # BLD: 0 /100 WBCS — SIGNIFICANT CHANGE UP (ref 0–0)
PHOSPHATE SERPL-MCNC: 3.5 MG/DL — SIGNIFICANT CHANGE UP (ref 2.5–4.5)
PHOSPHATE SERPL-MCNC: 4.6 MG/DL — HIGH (ref 2.5–4.5)
PLATELET # BLD AUTO: 377 K/UL — SIGNIFICANT CHANGE UP (ref 150–400)
POTASSIUM SERPL-MCNC: 4.1 MMOL/L — SIGNIFICANT CHANGE UP (ref 3.5–5.3)
POTASSIUM SERPL-MCNC: 4.4 MMOL/L — SIGNIFICANT CHANGE UP (ref 3.5–5.3)
POTASSIUM SERPL-SCNC: 4.1 MMOL/L — SIGNIFICANT CHANGE UP (ref 3.5–5.3)
POTASSIUM SERPL-SCNC: 4.4 MMOL/L — SIGNIFICANT CHANGE UP (ref 3.5–5.3)
RBC # BLD: 2.83 M/UL — LOW (ref 3.8–5.2)
RBC # FLD: 25.7 % — HIGH (ref 10.3–14.5)
SODIUM SERPL-SCNC: 143 MMOL/L — SIGNIFICANT CHANGE UP (ref 135–145)
SODIUM SERPL-SCNC: 146 MMOL/L — HIGH (ref 135–145)
WBC # BLD: 13.47 K/UL — HIGH (ref 3.8–10.5)
WBC # FLD AUTO: 13.47 K/UL — HIGH (ref 3.8–10.5)

## 2020-06-01 PROCEDURE — 71045 X-RAY EXAM CHEST 1 VIEW: CPT | Mod: 26,76

## 2020-06-01 PROCEDURE — 99291 CRITICAL CARE FIRST HOUR: CPT

## 2020-06-01 PROCEDURE — 99292 CRITICAL CARE ADDL 30 MIN: CPT

## 2020-06-01 PROCEDURE — 95720 EEG PHY/QHP EA INCR W/VEEG: CPT

## 2020-06-01 PROCEDURE — 70450 CT HEAD/BRAIN W/O DYE: CPT | Mod: 26

## 2020-06-01 RX ORDER — HUMAN INSULIN 100 [IU]/ML
35 INJECTION, SUSPENSION SUBCUTANEOUS EVERY 6 HOURS
Refills: 0 | Status: DISCONTINUED | OUTPATIENT
Start: 2020-06-01 | End: 2020-06-02

## 2020-06-01 RX ORDER — AZITHROMYCIN 500 MG/1
1 TABLET, FILM COATED ORAL
Qty: 0 | Refills: 0 | DISCHARGE

## 2020-06-01 RX ORDER — FLUTICASONE PROPIONATE AND SALMETEROL 50; 250 UG/1; UG/1
1 POWDER ORAL; RESPIRATORY (INHALATION)
Qty: 0 | Refills: 0 | DISCHARGE

## 2020-06-01 RX ORDER — SITAGLIPTIN 50 MG/1
1 TABLET, FILM COATED ORAL
Qty: 0 | Refills: 0 | DISCHARGE

## 2020-06-01 RX ORDER — ASPIRIN/CALCIUM CARB/MAGNESIUM 324 MG
1 TABLET ORAL
Qty: 0 | Refills: 0 | DISCHARGE

## 2020-06-01 RX ORDER — ERYTHROMYCIN ETHYLSUCCINATE 400 MG
1 TABLET ORAL
Qty: 0 | Refills: 0 | DISCHARGE

## 2020-06-01 RX ORDER — CLOPIDOGREL BISULFATE 75 MG/1
1 TABLET, FILM COATED ORAL
Qty: 0 | Refills: 0 | DISCHARGE

## 2020-06-01 RX ORDER — GABAPENTIN 400 MG/1
1 CAPSULE ORAL
Qty: 0 | Refills: 0 | DISCHARGE

## 2020-06-01 RX ORDER — TRAMADOL HYDROCHLORIDE 50 MG/1
1 TABLET ORAL
Qty: 0 | Refills: 0 | DISCHARGE

## 2020-06-01 RX ORDER — METOPROLOL TARTRATE 50 MG
1 TABLET ORAL
Qty: 0 | Refills: 0 | DISCHARGE

## 2020-06-01 RX ORDER — SODIUM CHLORIDE 9 MG/ML
1000 INJECTION, SOLUTION INTRAVENOUS
Refills: 0 | Status: DISCONTINUED | OUTPATIENT
Start: 2020-06-01 | End: 2020-07-14

## 2020-06-01 RX ORDER — BUDESONIDE AND FORMOTEROL FUMARATE DIHYDRATE 160; 4.5 UG/1; UG/1
2 AEROSOL RESPIRATORY (INHALATION)
Qty: 0 | Refills: 0 | DISCHARGE

## 2020-06-01 RX ORDER — DEXTROSE 50 % IN WATER 50 %
25 SYRINGE (ML) INTRAVENOUS ONCE
Refills: 0 | Status: DISCONTINUED | OUTPATIENT
Start: 2020-06-01 | End: 2020-06-13

## 2020-06-01 RX ORDER — CETIRIZINE HYDROCHLORIDE 10 MG/1
1 TABLET ORAL
Qty: 0 | Refills: 0 | DISCHARGE

## 2020-06-01 RX ORDER — LEVOTHYROXINE SODIUM 125 MCG
1 TABLET ORAL
Qty: 0 | Refills: 0 | DISCHARGE

## 2020-06-01 RX ORDER — AZITHROMYCIN 500 MG/1
250 TABLET, FILM COATED ORAL
Qty: 0 | Refills: 0 | DISCHARGE

## 2020-06-01 RX ORDER — ENOXAPARIN SODIUM 100 MG/ML
40 INJECTION SUBCUTANEOUS
Refills: 0 | Status: DISCONTINUED | OUTPATIENT
Start: 2020-06-01 | End: 2020-06-05

## 2020-06-01 RX ORDER — PANTOPRAZOLE SODIUM 20 MG/1
1 TABLET, DELAYED RELEASE ORAL
Qty: 0 | Refills: 0 | DISCHARGE

## 2020-06-01 RX ORDER — INSULIN ASPART 100 [IU]/ML
0 INJECTION, SOLUTION SUBCUTANEOUS
Qty: 0 | Refills: 0 | DISCHARGE

## 2020-06-01 RX ORDER — DEXTROSE 50 % IN WATER 50 %
12.5 SYRINGE (ML) INTRAVENOUS ONCE
Refills: 0 | Status: DISCONTINUED | OUTPATIENT
Start: 2020-06-01 | End: 2020-06-13

## 2020-06-01 RX ORDER — PRIMIDONE 250 MG/1
1 TABLET ORAL
Qty: 0 | Refills: 0 | DISCHARGE

## 2020-06-01 RX ORDER — HYDRALAZINE HCL 50 MG
10 TABLET ORAL ONCE
Refills: 0 | Status: COMPLETED | OUTPATIENT
Start: 2020-06-01 | End: 2020-06-01

## 2020-06-01 RX ORDER — INSULIN LISPRO 100/ML
VIAL (ML) SUBCUTANEOUS EVERY 6 HOURS
Refills: 0 | Status: DISCONTINUED | OUTPATIENT
Start: 2020-06-01 | End: 2020-06-01

## 2020-06-01 RX ORDER — DEXTROSE 50 % IN WATER 50 %
25 SYRINGE (ML) INTRAVENOUS ONCE
Refills: 0 | Status: DISCONTINUED | OUTPATIENT
Start: 2020-06-01 | End: 2020-07-11

## 2020-06-01 RX ORDER — ALBUTEROL 90 UG/1
2 AEROSOL, METERED ORAL
Qty: 0 | Refills: 0 | DISCHARGE

## 2020-06-01 RX ORDER — OLANZAPINE 15 MG/1
1 TABLET, FILM COATED ORAL
Qty: 0 | Refills: 0 | DISCHARGE

## 2020-06-01 RX ORDER — INSULIN GLARGINE 100 [IU]/ML
20 INJECTION, SOLUTION SUBCUTANEOUS
Qty: 0 | Refills: 0 | DISCHARGE

## 2020-06-01 RX ORDER — LEVOTHYROXINE SODIUM 125 MCG
150 TABLET ORAL AT BEDTIME
Refills: 0 | Status: DISCONTINUED | OUTPATIENT
Start: 2020-06-01 | End: 2020-06-02

## 2020-06-01 RX ORDER — DEXTROSE 50 % IN WATER 50 %
15 SYRINGE (ML) INTRAVENOUS ONCE
Refills: 0 | Status: DISCONTINUED | OUTPATIENT
Start: 2020-06-01 | End: 2020-06-13

## 2020-06-01 RX ORDER — HUMAN INSULIN 100 [IU]/ML
28 INJECTION, SUSPENSION SUBCUTANEOUS EVERY 6 HOURS
Refills: 0 | Status: DISCONTINUED | OUTPATIENT
Start: 2020-06-01 | End: 2020-06-01

## 2020-06-01 RX ORDER — INSULIN LISPRO 100/ML
VIAL (ML) SUBCUTANEOUS EVERY 4 HOURS
Refills: 0 | Status: DISCONTINUED | OUTPATIENT
Start: 2020-06-01 | End: 2020-06-03

## 2020-06-01 RX ORDER — RANITIDINE HYDROCHLORIDE 150 MG/1
1 TABLET, FILM COATED ORAL
Qty: 0 | Refills: 0 | DISCHARGE

## 2020-06-01 RX ORDER — SIMVASTATIN 20 MG/1
1 TABLET, FILM COATED ORAL
Qty: 0 | Refills: 0 | DISCHARGE

## 2020-06-01 RX ORDER — GLUCAGON INJECTION, SOLUTION 0.5 MG/.1ML
1 INJECTION, SOLUTION SUBCUTANEOUS ONCE
Refills: 0 | Status: DISCONTINUED | OUTPATIENT
Start: 2020-06-01 | End: 2020-06-13

## 2020-06-01 RX ORDER — FUROSEMIDE 40 MG
1 TABLET ORAL
Qty: 0 | Refills: 0 | DISCHARGE

## 2020-06-01 RX ADMIN — Medication 5 MILLIGRAM(S): at 22:24

## 2020-06-01 RX ADMIN — ENOXAPARIN SODIUM 40 MILLIGRAM(S): 100 INJECTION SUBCUTANEOUS at 17:46

## 2020-06-01 RX ADMIN — Medication 10 MILLIGRAM(S): at 03:37

## 2020-06-01 RX ADMIN — CHLORHEXIDINE GLUCONATE 15 MILLILITER(S): 213 SOLUTION TOPICAL at 17:46

## 2020-06-01 RX ADMIN — Medication 150 MICROGRAM(S): at 21:42

## 2020-06-01 RX ADMIN — HUMAN INSULIN 28 UNIT(S): 100 INJECTION, SUSPENSION SUBCUTANEOUS at 11:02

## 2020-06-01 RX ADMIN — Medication 6: at 17:47

## 2020-06-01 RX ADMIN — LEVETIRACETAM 400 MILLIGRAM(S): 250 TABLET, FILM COATED ORAL at 17:45

## 2020-06-01 RX ADMIN — HUMAN INSULIN 35 UNIT(S): 100 INJECTION, SUSPENSION SUBCUTANEOUS at 17:46

## 2020-06-01 RX ADMIN — AMLODIPINE BESYLATE 10 MILLIGRAM(S): 2.5 TABLET ORAL at 05:11

## 2020-06-01 RX ADMIN — Medication 12.5 MILLIGRAM(S): at 05:12

## 2020-06-01 RX ADMIN — Medication 5 MILLIGRAM(S): at 05:11

## 2020-06-01 RX ADMIN — SIMVASTATIN 20 MILLIGRAM(S): 20 TABLET, FILM COATED ORAL at 21:42

## 2020-06-01 RX ADMIN — Medication 5 MILLIGRAM(S): at 13:37

## 2020-06-01 RX ADMIN — POLYETHYLENE GLYCOL 3350 17 GRAM(S): 17 POWDER, FOR SOLUTION ORAL at 05:10

## 2020-06-01 RX ADMIN — CHLORHEXIDINE GLUCONATE 15 MILLILITER(S): 213 SOLUTION TOPICAL at 05:11

## 2020-06-01 RX ADMIN — CHLORHEXIDINE GLUCONATE 1 APPLICATION(S): 213 SOLUTION TOPICAL at 21:42

## 2020-06-01 RX ADMIN — PANTOPRAZOLE SODIUM 40 MILLIGRAM(S): 20 TABLET, DELAYED RELEASE ORAL at 12:02

## 2020-06-01 RX ADMIN — FENTANYL CITRATE 25 MICROGRAM(S): 50 INJECTION INTRAVENOUS at 03:31

## 2020-06-01 RX ADMIN — LEVETIRACETAM 400 MILLIGRAM(S): 250 TABLET, FILM COATED ORAL at 05:10

## 2020-06-01 RX ADMIN — Medication 6: at 21:48

## 2020-06-01 RX ADMIN — Medication 12.5 MILLIGRAM(S): at 17:46

## 2020-06-01 RX ADMIN — INSULIN HUMAN 3 UNIT(S)/HR: 100 INJECTION, SOLUTION SUBCUTANEOUS at 04:38

## 2020-06-01 NOTE — PROGRESS NOTE ADULT - SUBJECTIVE AND OBJECTIVE BOX
Converted to NPH from insulin gtt.     EEG without jennifer seizures, but with frequent brief runs of 0.5-1Hz generalized periodic discharges, maximal in the left and right frontotemporal regions, with shifting laterality as well as moderate to severe generalized slowing.    EXAMINATION: Converted to NPH from insulin gtt.     EEG without jennifer seizures, but with frequent brief runs of 0.5-1Hz generalized periodic discharges, maximal in the left and right frontotemporal regions, with shifting laterality as well as moderate to severe generalized slowing.    EXAMINATION:   Eyes closed, no opening to noxious, pupils reactive, +cough/gag, does not breathe above set rate of 14 but does breathe spontaneously when rate lowered, trace movement to noxious in RUE, w/d LUE, TF b/l LEs

## 2020-06-01 NOTE — DIETITIAN INITIAL EVALUATION ADULT. - ENERGY NEEDS
Ht: 64"  Wt: 219.9 lbs   BMI: 37.7 kg/m2   IBW: 120 lbs (+/-10%)     183 % IBW  Edema: 2+ generalized             Skin: : surgical incision medial lower abdomen

## 2020-06-01 NOTE — DIETITIAN INITIAL EVALUATION ADULT. - PERTINENT MEDS FT
Peridex, Hibiclens, Insulin Regular, Amlodipine, Fentanyl, Keppra, Synthroid, Lopressor, Protonix, Prednisone, Zocor, Miralax, Senna

## 2020-06-01 NOTE — PROGRESS NOTE ADULT - SUBJECTIVE AND OBJECTIVE BOX
63 yo F with AMS since this morning.  Pt. was confused, didn't recognize son this morning.  He notes she was sleeping more than usual yesterday, which tipped off son that something was wrong.  At 11 am pt. was gasping for air like she was seizing. 2 hours ago pt. was dry heaving, her chest was going up and down and she started shaking like she was having a seizure.  Pt. has no history of seizures per son.  		PMH: hypothyroidism, HTN, IDDM, COPD/CHRIS on home O2, RA (on Prednisone), SBO s/p ex lap with lysis of adhesions c/b evisceration,  recent hospitalization in 3/2020 with encephalopathy, significantly hypothyroid (, without myxedema coma), small (stable) right sided SDH, UTI, catatonic reaction to VPA (now resolved) and DKA and psychiatric issues, 5/2020 hospitalization for GIB, Emphysematous cystitis, endometritis, Sigmoid colitis, morbid obesity, functional quadriplegia    HCP: Norris Rush, Son [phone]; Daughter, Heavenly Schmidt [phone]; son asks that only him or his sister, Heavenly be contacted for patient updates/information as they are both proxy's for patient (29 May 2020 23:41)    OVERNIGHT EVENTS:  Vital Signs Last 24 Hrs  T(C): 37.5 (01 Jun 2020 03:00), Max: 37.8 (31 May 2020 19:00)  T(F): 99.5 (01 Jun 2020 03:00), Max: 100 (31 May 2020 19:00)  HR: 76 (01 Jun 2020 06:00) (65 - 98)  BP: --  BP(mean): --  RR: 20 (01 Jun 2020 06:00) (15 - 35)  SpO2: 100% (01 Jun 2020 06:00) (100% - 100%)    I&O's Detail    31 May 2020 07:01  -  01 Jun 2020 07:00  --------------------------------------------------------  IN:    Enteral Tube Flush: 240 mL    Glucerna: 1280 mL    insulin regular Infusion: 83 mL    IV PiggyBack: 200 mL    niCARdipine Infusion: 165 mL  Total IN: 1968 mL    OUT:    Intermittent Catheterization - Urethral: 1330 mL  Total OUT: 1330 mL    Total NET: 638 mL        I&O's Summary    31 May 2020 07:01  -  01 Jun 2020 07:00  --------------------------------------------------------  IN: 1968 mL / OUT: 1330 mL / NET: 638 mL        PHYSICAL EXAM:  Neurological:    Motor exam:         [] Upper extremity              Bi(c5)  WE(c6)  EE(c7)   FF(c8)                                                R         5/5        5/5        5/5       5/5                                               L          5/5        5/5        5/5       5/5         [] Lower extremeity          HF(l2)   KE(l3)    TA(l4)   EHL(l5)  GS(s1)                                                 R        5/5        5/5        5/5       5/5         5/5                                               L         5/5        5/5       5/5       5/5          5/5                                                        [] warm well perfused; capillary refill <3 seconds     Sensation: [] intact to light touch  [] decreased:       Cardiovascular:  Respiratory:  Gastrointestinal:  Genitourinary:  Extremities:  Incision/Wound:    TUBES/LINES:  [] CVC  [] A-line  [] Lumbar Drain  [] Ventriculostomy  [] Other    DIET:  [] NPO  [] Mechanical  [] Tube feeds    LABS:                        8.2    13.47 )-----------( 377      ( 01 Jun 2020 03:11 )             26.9     06-01    146<H>  |  112<H>  |  12  ----------------------------<  158<H>  4.1   |  22  |  0.65    Ca    9.7      01 Jun 2020 03:11  Phos  3.5     06-01  Mg     2.0     06-01        Urinalysis Basic - ( 30 May 2020 11:59 )    Color: Light Yellow / Appearance: Clear / SG: >1.050 / pH: x  Gluc: x / Ketone: Trace  / Bili: Negative / Urobili: Negative   Blood: x / Protein: Trace / Nitrite: Positive   Leuk Esterase: Large / RBC: 3 /hpf / WBC 32 /HPF   Sq Epi: x / Non Sq Epi: 2 /hpf / Bacteria: Negative          CAPILLARY BLOOD GLUCOSE      POCT Blood Glucose.: 179 mg/dL (01 Jun 2020 06:55)  POCT Blood Glucose.: 200 mg/dL (01 Jun 2020 06:14)  POCT Blood Glucose.: 201 mg/dL (01 Jun 2020 05:08)  POCT Blood Glucose.: 182 mg/dL (01 Jun 2020 03:59)  POCT Blood Glucose.: 141 mg/dL (01 Jun 2020 02:58)  POCT Blood Glucose.: 128 mg/dL (01 Jun 2020 02:04)  POCT Blood Glucose.: 154 mg/dL (01 Jun 2020 00:58)  POCT Blood Glucose.: 142 mg/dL (01 Jun 2020 00:04)  POCT Blood Glucose.: 137 mg/dL (31 May 2020 23:06)  POCT Blood Glucose.: 141 mg/dL (31 May 2020 22:02)  POCT Blood Glucose.: 145 mg/dL (31 May 2020 21:18)  POCT Blood Glucose.: 145 mg/dL (31 May 2020 20:06)  POCT Blood Glucose.: 149 mg/dL (31 May 2020 19:06)  POCT Blood Glucose.: 164 mg/dL (31 May 2020 18:12)  POCT Blood Glucose.: 180 mg/dL (31 May 2020 17:08)  POCT Blood Glucose.: 153 mg/dL (31 May 2020 16:04)  POCT Blood Glucose.: 182 mg/dL (31 May 2020 15:10)  POCT Blood Glucose.: 198 mg/dL (31 May 2020 14:03)  POCT Blood Glucose.: 250 mg/dL (31 May 2020 12:59)  POCT Blood Glucose.: 229 mg/dL (31 May 2020 12:03)  POCT Blood Glucose.: 230 mg/dL (31 May 2020 11:03)  POCT Blood Glucose.: 243 mg/dL (31 May 2020 10:05)      Drug Levels: [] N/A    CSF Analysis: [] N/A      Allergies    Depakote (Other)  Seroquel (Other (Severe))    Intolerances      MEDICATIONS:  Antibiotics:    Neuro:  acetaminophen    Suspension .. 650 milliGRAM(s) Oral every 6 hours PRN  fentaNYL    Injectable 25 MICROGram(s) IV Push every 2 hours PRN  levETIRAcetam  IVPB 1000 milliGRAM(s) IV Intermittent every 12 hours    Anticoagulation:    OTHER:  amLODIPine   Tablet 10 milliGRAM(s) Oral daily  chlorhexidine 0.12% Liquid 15 milliLiter(s) Oral Mucosa every 12 hours  chlorhexidine 4% Liquid 1 Application(s) Topical <User Schedule>  insulin regular Infusion 3 Unit(s)/Hr IV Continuous <Continuous>  levothyroxine Injectable 100 MICROGram(s) IV Push at bedtime  metoprolol tartrate 12.5 milliGRAM(s) Oral two times a day  pantoprazole  Injectable 40 milliGRAM(s) IV Push daily  polyethylene glycol 3350 17 Gram(s) Oral every 12 hours  predniSONE   Tablet 5 milliGRAM(s) Oral three times a day  senna 2 Tablet(s) Oral at bedtime  simvastatin 20 milliGRAM(s) Oral at bedtime    IVF:    CULTURES:  Culture Results:   <10,000 CFU/mL Normal Urogenital Peral (05-30 @ 18:13)  Culture Results:   No growth to date. (05-30 @ 05:38)    RADIOLOGY & ADDITIONAL TESTS: 65 yo F with AMS since this morning.  Pt. was confused, didn't recognize son this morning.  He notes she was sleeping more than usual yesterday, which tipped off son that something was wrong.  At 11 am pt. was gasping for air like she was seizing. 2 hours ago pt. was dry heaving, her chest was going up and down and she started shaking like she was having a seizure.  Pt. has no history of seizures per son.  		PMH: hypothyroidism, HTN, IDDM, COPD/CHRIS on home O2, RA (on Prednisone), SBO s/p ex lap with lysis of adhesions c/b evisceration,  recent hospitalization in 3/2020 with encephalopathy, significantly hypothyroid (, without myxedema coma), small (stable) right sided SDH, UTI, catatonic reaction to VPA (now resolved) and DKA and psychiatric issues, 5/2020 hospitalization for GIB, Emphysematous cystitis, endometritis, Sigmoid colitis, morbid obesity, functional quadriplegia    HCP: Norris Rush, Son [phone]; Daughter, Heavenly Schmidt [phone]; son asks that only him or his sister, Heavenly be contacted for patient updates/information as they are both proxy's for patient (29 May 2020 23:41)    OVERNIGHT EVENTS: stable, ET in right bronchus     T(C): 37.7 (06-01-20 @ 07:00), Max: 37.8 (05-31-20 @ 19:00)  HR: 77 (06-01-20 @ 08:10) (66 - 98)  BP: --  RR: 22 (06-01-20 @ 08:00) (15 - 35)  SpO2: 100% (06-01-20 @ 08:10) (100% - 100%)  05-31-20 @ 07:01  -  06-01-20 @ 07:00  --------------------------------------------------------  IN: 1974.5 mL / OUT: 1330 mL / NET: 644.5 mL    06-01-20 @ 07:01  -  06-01-20 @ 09:06  --------------------------------------------------------  IN: 66.5 mL / OUT: 0 mL / NET: 66.5 mL    acetaminophen    Suspension .. 650 milliGRAM(s) Oral every 6 hours PRN  amLODIPine   Tablet 10 milliGRAM(s) Oral daily  chlorhexidine 0.12% Liquid 15 milliLiter(s) Oral Mucosa every 12 hours  chlorhexidine 4% Liquid 1 Application(s) Topical <User Schedule>  fentaNYL    Injectable 25 MICROGram(s) IV Push every 2 hours PRN  insulin regular Infusion 3 Unit(s)/Hr IV Continuous <Continuous>  levETIRAcetam  IVPB 1000 milliGRAM(s) IV Intermittent every 12 hours  levothyroxine Injectable 100 MICROGram(s) IV Push at bedtime  metoprolol tartrate 12.5 milliGRAM(s) Oral two times a day  pantoprazole  Injectable 40 milliGRAM(s) IV Push daily  polyethylene glycol 3350 17 Gram(s) Oral every 12 hours  predniSONE   Tablet 5 milliGRAM(s) Oral three times a day  senna 2 Tablet(s) Oral at bedtime  simvastatin 20 milliGRAM(s) Oral at bedtime  sodium chloride 0.9% lock flush 10 milliLiter(s) IV Push every 1 hour PRN  Mode: AC/ CMV (Assist Control/ Continuous Mandatory Ventilation), RR (machine): 14, TV (machine): 450, FiO2: 30, PEEP: 5, ITime: 1, MAP: 11, PIP: 25      PHYSICAL EXAM:  Constitutional: Intubated     Neurological: intubated, EO to nox, Pupils 3mm R, +corneals, +cough/gag, overbreathing, no FC, UE 0, LE triple flexion      Pulmonary: Clear to Auscultation, No rales, No rhonchi, No wheezes     Cardiovascular: S1, S2, Regular rate and rhythm     Gastrointestinal: Soft, Non-tender, Non-distended     Extremities: No calf tenderness     TUBES/LINES:  [] CVC  [] A-line  [] Lumbar Drain  [] Ventriculostomy  [] Other    DIET:  [] NPO  [] Mechanical  [] Tube feeds    LABS:                        8.2    13.47 )-----------( 377      ( 01 Jun 2020 03:11 )             26.9     06-01    146<H>  |  112<H>  |  12  ----------------------------<  158<H>  4.1   |  22  |  0.65    Ca    9.7      01 Jun 2020 03:11  Phos  3.5     06-01  Mg     2.0     06-01        Urinalysis Basic - ( 30 May 2020 11:59 )    Color: Light Yellow / Appearance: Clear / SG: >1.050 / pH: x  Gluc: x / Ketone: Trace  / Bili: Negative / Urobili: Negative   Blood: x / Protein: Trace / Nitrite: Positive   Leuk Esterase: Large / RBC: 3 /hpf / WBC 32 /HPF   Sq Epi: x / Non Sq Epi: 2 /hpf / Bacteria: Negative          CAPILLARY BLOOD GLUCOSE      POCT Blood Glucose.: 179 mg/dL (01 Jun 2020 06:55)  POCT Blood Glucose.: 200 mg/dL (01 Jun 2020 06:14)  POCT Blood Glucose.: 201 mg/dL (01 Jun 2020 05:08)  POCT Blood Glucose.: 182 mg/dL (01 Jun 2020 03:59)  POCT Blood Glucose.: 141 mg/dL (01 Jun 2020 02:58)  POCT Blood Glucose.: 128 mg/dL (01 Jun 2020 02:04)  POCT Blood Glucose.: 154 mg/dL (01 Jun 2020 00:58)  POCT Blood Glucose.: 142 mg/dL (01 Jun 2020 00:04)  POCT Blood Glucose.: 137 mg/dL (31 May 2020 23:06)  POCT Blood Glucose.: 141 mg/dL (31 May 2020 22:02)  POCT Blood Glucose.: 145 mg/dL (31 May 2020 21:18)  POCT Blood Glucose.: 145 mg/dL (31 May 2020 20:06)  POCT Blood Glucose.: 149 mg/dL (31 May 2020 19:06)  POCT Blood Glucose.: 164 mg/dL (31 May 2020 18:12)  POCT Blood Glucose.: 180 mg/dL (31 May 2020 17:08)  POCT Blood Glucose.: 153 mg/dL (31 May 2020 16:04)  POCT Blood Glucose.: 182 mg/dL (31 May 2020 15:10)  POCT Blood Glucose.: 198 mg/dL (31 May 2020 14:03)  POCT Blood Glucose.: 250 mg/dL (31 May 2020 12:59)  POCT Blood Glucose.: 229 mg/dL (31 May 2020 12:03)  POCT Blood Glucose.: 230 mg/dL (31 May 2020 11:03)  POCT Blood Glucose.: 243 mg/dL (31 May 2020 10:05)      Drug Levels: [] N/A    CSF Analysis: [] N/A      Allergies    Depakote (Other)  Seroquel (Other (Severe))    Intolerances      IVF:    CULTURES:  Culture Results:   <10,000 CFU/mL Normal Urogenital Perla (05-30 @ 18:13)  Culture Results:   No growth to date. (05-30 @ 05:38)    RADIOLOGY & ADDITIONAL TESTS:    IMPRESSION: Evidence of left frontal parenchymal hematoma with mass effect on the left lateral ventricle and intraventricular extension with blood layering in the occipital horns. No hydrocephalus. No change in the hematoma.

## 2020-06-01 NOTE — DIETITIAN INITIAL EVALUATION ADULT. - ENTERAL
1) Recommend increase Glucerna 1.2 to 65ml/hr x 24 hrs. To provide (based on upper  lbs/60kg): 1560ml, 1872kcal (31kcal/kg) and 94g protein (1.6g protein/kg).

## 2020-06-01 NOTE — PROGRESS NOTE ADULT - ASSESSMENT
65 yo F with AMS since this morning, CT demonstrated RT frontal ICH with IVH. Questionable seizure, intubated at [facility] and transferred to [facility] for management.   Please Check When Present   []  GCS  E   V  M     Heart Failure: []Acute, [] acute on chronic , []chronic  Heart Failure:  [] Diastolic (HFpEF), [] Systolic (HFrEF), []Combined (HFpEF and HFrEF), [] RHF, [] Pulm HTN, [] Other    [] SAM, [] ATN, [] AIN, [] other  [] CKD1, [] CKD2, [] CKD 3, [] CKD 4, [] CKD 5, []ESRD    Encephalopathy: [] Metabolic, [] Hepatic, [] toxic, [] Neurological, [] Other    Abnormal Nurtitional Status: [] malnurtition (see nutrition note), [ ]underweight: BMI < 19, [] morbid obesity: BMI >40, [] Cachexia    [] Sepsis  [] hypovolemic shock,[] cardiogenic shock, [] hemorrhagic shock, [] neuogenic shock  [] Acute Respiratory Failure  []Cerebral edema, [] Brain compression/ herniation,   [] Functional quadriplegia  [] Acute blood loss anemia      PLAN:   Neuro:  -Q1 neuro checks  -CT V negative  - VEEG in progress - no seizure noted overnight  -keppra for ?seizure on presentation  -Sedation with precedex if needed  - tylenol prn for pain control  - activity: bedrest  -PT/OT/S&S ordered      Pulm:  -intubated: Mode: AC/ CMV (Assist Control/ Continuous Mandatory Ventilation)  RR (machine): 14  TV (machine): 450  FiO2: 30  PEEP: 5  ITime: 1  MAP: 11  PIP: 32  -Peridex for oral hygiene      CV:  --140  -TTE negative for PFO  -add norvasc 10 and wean off cardene  -continue home dose zocor    GI:  -start feeds, glucerna  -GI ppx: protonix  -Bowel regimen: senna, miralax    Renal:  -Na goal 145-155  - 2% held, , rapid increase, continue BMP Q 6    Heme/Onc:  -Hold chemo ppx due to ICH  -SCDS for dvt ppx  -LED dopplers done      Endo:  -  Insulin GTT for control  - continue synthroid for hypothyroidism  -continue prednisone 5mg TID for RA    ID:  - Cont ceftriaxone for UTI, fu culture for speciation  -monitor for fevers  -follow-up panCx for hypothermia    CODE STATUS:  [x] Full Code [] DNR [] DNI [] Palliative/Comfort Care    DISPOSITION:  [x] ICU    [x] Patient is at high risk of neurologic deterioration/death due to: ICH, brain herniation    Time spent:  45 [x] critical care minutes 65 yo F with AMS since this morning, CT demonstrated RT frontal ICH with IVH. Questionable seizure, intubated at [facility] and transferred to [facility] for management.   pathogenesis: CTV no sinus thrombosis, CTA no vascular malformation explaining the IPH, it is less liekly to be HTN given MRI few months ago with no hemosiderin deposits  this can still be due to vascular malformation, will need DSA if neuro exam improves     PLAN:   Neuro:  -Q1 neuro checks  - VEEG in progress - runs of GPEDS, will continue monitoring   -keppra for ?seizure on presentation  -Sedation fentanyl PRN   - tylenol prn for pain control  - activity: bedrest  -PT/OT/S&S ordered      Pulm:  intubated since 29   pull et 2 cm out as it was in the right bronchus   -intubated: Mode: AC/ CMV (Assist Control/ Continuous Mandatory Ventilation)  History of COPD was on prednisone at home, now on prednisone 15 mg daily   will re-evalute if she was on it at home and for how long    Blood Gas Profile - Arterial (06.01.20 @ 03:09)    pH, Arterial: 7.46    pCO2, Arterial: 36 mmHg    pO2, Arterial: 113 mmHg    HCO3, Arterial: 25 mmol/L    Base Excess, Arterial: 1.6 mmol/L    Oxygen Saturation, Arterial: 99 %    Total CO2, Arterial: 26 mmoL/L    FIO2, Arterial: 30    Blood Gas Source Arterial: Arterial  CPAP as tolerated       CV:  --160  -TTE negative for PFO  -on norvasc 10 and metoprolol 12.5 BID   -continue home dose zocor    GI:  -start feeds, glucerna  -GI ppx: protonix  -Bowel regimen: senna,  miralax   last BM on the 30the    Renal:  -Na goal 145-155  -IVL     Heme/Onc:  -Hold chemo ppx due to ICH  -SCDS for dvt ppx  -LED dopplers done      Endo:  -  Insulin GTT , will discontinue 2 hr after NPH  -ISS and moderate sliding scale   - continue synthroid for hypothyroidism ( was on synthroid 175 oral at home)   consult endocrinology   -start NPH 28 units q 6 hr   -continue prednisone 5mg TID ( was on it at home)    ID:  - not on ABX   -cx neg so far     CODE STATUS:  [x] Full Code [] DNR [] DNI [] Palliative/Comfort Care    DISPOSITION:  [x] ICU    [x] Patient is at high risk of neurologic deterioration/death due to: ICH, brain herniation    Time spent:  45 [x] critical care minutes

## 2020-06-01 NOTE — PROGRESS NOTE ADULT - ASSESSMENT
Left frontal intracerebral hemorrhage  - EEG r/o seizures; given GPDs, on levetiracetam  - history of psychosis, so if becomes agitated, consider converting levetiracetam to VPA versus lacosamide  - -160mmHg; continue BP meds  - on NPH, monitor FS  - continue levothyroxine Left frontal intracerebral hemorrhage  - EEG r/o seizures; given GPDs, on levetiracetam  - history of psychosis, so if becomes agitated, consider converting levetiracetam to VPA versus lacosamide  - -160mmHg; continue BP meds  - on NPH, monitor FS; may need to adjust NPH does for goal -180  - continue levothyroxine

## 2020-06-01 NOTE — DIETITIAN INITIAL EVALUATION ADULT. - PERTINENT LABORATORY DATA
(6/1): Hgb 8.2, Hct 26.9, POCT Blood Glucose 179, 200, 201, 182, Na 146, Cl 112, Glu 158; (5/30): Chol 202, , A1c (estimated average) 7.4%

## 2020-06-01 NOTE — PHARMACOTHERAPY INTERVENTION NOTE - COMMENTS
Spoke with patient's pharmacy and was told patient was filling  synthroid 175 mcg daily  - last fill 4/22 x 90 days by Dr. Betito Nails [phone]  zocor 20 mg daily - fill 4/25 x 90 days  plavix 75 mg daily - fill 4/25 x 90 days  januvia 50 mg daily - fill 3/20 x 30 days  metoprolol ER 50 mg daily - fill 5/3  gabapentin 300 mg three times daily x 90 days - fill 4/25   olanzapine 2.5 mg twice daily - fill 3/20 x 30 days  primidone 50 mg at bedtime - 8/30/19 by Dr. Marlow [phone]. Was told from rehab that prescriber on longer there and must get my information from family  Symbicort - fill 2019. Did not  Advair  prednisone 15 mg daily x 30 days - Dr. Ivett Pulliam pulmonologist [phone]    Left message with patient's daughter to clarify home medications [phone] and was told patient was filling  synthroid 175 mcg daily  - last fill 4/22 x 90 days by Dr. Betito Nails [phone]  zocor 20 mg daily - fill 4/25 x 90 days  plavix 75 mg daily - fill 4/25 x 90 days  januvia 50 mg daily - fill 3/20 x 30 days  metoprolol ER 50 mg daily - fill 5/3  gabapentin 300 mg three times daily x 90 days - fill 4/25   olanzapine 2.5 mg twice daily - fill 3/20 x 30 days  primidone 50 mg at bedtime - 8/30/19 by Dr. Marlow [phone]. Was told from rehab that prescriber on longer there and must get my information from family  Symbicort - fill 2019. Did not  Advair  prednisone 15 mg daily x 30 days - Dr. Ivett Pulliam pulmonologist [phone]  Insulin glargine - not filled    Left message with patient's daughter to clarify home medications

## 2020-06-01 NOTE — DIETITIAN INITIAL EVALUATION ADULT. - OTHER INFO
Pt is a 63 yo F with PMH: hypothyroidism, HTN, IDDM, COPD/CHRIS on home O2, RA, SBO s/p ex lap with lysis of adhesions c/b evisceration. Noted with recent hospitalization for  in 3/2020 with encephalopathy, hypothyroid, small right sided SDH, UTI, catatonic reaction to VPA, DKA and psychiatric issues; hospitalization in 5/2020 for GIB, endometritis, Sigmoid colitis, morbid obesity, functional quadriplegia. Pt admitted with possible seizure activity at home; found to have L IPH with IVH and SAH extension. Intubated at OSH and transferred to [facility]. Pt placed on vEEG; no surgical intervention noted at this time. Pt continues on insulin gtt for glycemic control. On antibiotics for UTI.     Pt intubated; unable to participate in nutrition evaluation. Pt well-known to nutrition department, with multiple previous hospital admissions within [facility]. Last seen 5/15/20 from RD at OSH. Per note, "pt lived c son who did the shopping/cooking PTA. Pt reports good appetite in general.  Diet PTA: no sugar, no sweets...pt denied hx of diabetes, stated that she had it few years ago, denied being on insulin PTA". Per chart, no known food allergies. Baseline tolerance to chewing/swallowing unclear at this time, however was prescribed a regular consistency diet on 5/15/20.     Wt history (per [facility]): (1/31/19): 247.6 lbs, (3/21/19): 205 lbs, (1/24/20): 207.1 lbs, (5/12/20): 216.8 lbs, (5/29/20): 219.9 lbs. Pt appears generally stable x 1 year.    Pt currently prescribed Glucerna 1.2 at 60ml/hr x 24 hrs. Infusing at goal rate (60ml/hr) at this time. Has received 480ml (thus far today), and 960ml (5/31). No documented BM's recorded in-house at this time. On bowel regimen as ordered. Pt receiving insulin gtt to promote optimal glycemic control.

## 2020-06-01 NOTE — DIETITIAN INITIAL EVALUATION ADULT. - ADD RECOMMEND
2) Monitor GI tolerance. RD to remain available to adjust EN formulary, volume/rate PRN. 3) If PO diet initiated, consider consistent carbohydrate diet. Defer consistency to medical team, SLP. 4) Obtain further subjective wt/diet history from pt/family PRN. 5) Monitor wt trends, nutrition related labs, skin integrity, bowel regularity. 6) Nutrition education re: DM deferred at this time given clinical status.

## 2020-06-01 NOTE — EEG REPORT - NS EEG TEXT BOX
[facility]   COMPREHENSIVE EPILEPSY CENTER   REPORT OF CONTINUOUS VIDEO EEG     [facility]: [address], [place] [address], Ph#: [phone]  [facility]: [address], [place] [address], Ph#: [phone]  [facility]: [address] [place] [address], Ph#: [phone]    Patient Name: AFTAB BASS  Age and : 64y ([birth year])  MRN #: [id number]  Location: [place] [address]  Referring Physician: Kimo Thomason    Start Time/Date: 16:25 on 2020   End Time: 08:00 on 2020  Duration: 24h    _____________________________________________________________  STUDY INFORMATION    EEG Recording Technique:  The patient underwent continuous Video-EEG monitoring, using Telemetry System hardware on the XLTek Digital System. EEG and video data were stored on a computer hard drive with important events saved in digital archive files. The material was reviewed by a physician (electroencephalographer / epileptologist) on a daily basis. Moy and seizure detection algorithms were utilized and reviewed. An EEG Technician attended to the patient, and was available throughout daytime work hours.  The epilepsy center neurologist was available in person or on call 24-hours per day.    EEG Placement and Labeling of Electrodes:  The EEG was performed utilizing 20 channel referential EEG connections (coronal over temporal over parasagittal montage) using all standard 10-20 electrode placements with EKG, with additional electrodes placed in the inferior temporal region using the modified 10-10 montage electrode placements for elective admissions, or if deemed necessary. Recording was at a sampling rate of 256 samples per second per channel. Time synchronized digital video recording was done simultaneously with EEG recording. A low light infrared camera was used for low light recording.     _____________________________________________________________  HISTORY    Patient is a 64y old  Female who presents with a chief complaint of ICH, SAH w/ IVH (31 May 2020 06:43)      PERTINENT MEDICATION:  levETIRAcetam  IVPB 1000 milliGRAM(s) IV Intermittent every 12 hours     _____________________________________________________________  STUDY INTERPRETATION    Findings: The background was spontaneously variable, reactive, and near-continuous, with occasional periods of intermittent 1s of diffuse suppression. No posterior dominant rhythm seen.    Background Slowing:  Diffuse theta and polymorphic delta slowing.    Focal Slowing:   None were present.    Sleep Background:  Stage II sleep transients were not recorded but there were state changes.    Other Non-Epileptiform Findings:  None were present.    Interictal Epileptiform Activity:   Frequent brief runs of 0.5-1Hz generalized periodic discharges, bluntly contoured, maximal in the bilateral frontotemporal regions, with shifting laterality.     Events:  Clinical events: None recorded.  Seizures: None recorded.    Activation Procedures:   Hyperventilation was not performed.    Photic stimulation was not performed.     Artifacts:  Intermittent myogenic and movement artifacts were noted.    ECG:  The heart rate channel was artifactual    _____________________________________________________________  EEG SUMMARY/CLASSIFICATION    Abnormal EEG in an altered patient.  - Frequent brief runs of 0.5-1Hz generalized periodic discharges, maximal in the left and right frontotemporal regions, with shifting laterality.   - Moderate to severe generalized slowing.    _____________________________________________________________  EEG IMPRESSION/CLINICAL CORRELATE    Abnormal EEG study.  Diffuse cortical hyperexcitability, maximal in the bilateral temporal regions.   Moderate to severe nonspecific diffuse or multifocal cerebral dysfunction.   No seizure seen.  _____________________________________________________________    [facility] Phd  Epilepsy Attending

## 2020-06-01 NOTE — DIETITIAN INITIAL EVALUATION ADULT. - SIGNS/SYMPTOMS
pt intubated s/p L IPH with IVH, SAH extension; dependent on OG tube as primary source of nutrition pt s/p L IPH with IVH and SAH extension.

## 2020-06-02 DIAGNOSIS — I10 ESSENTIAL (PRIMARY) HYPERTENSION: ICD-10-CM

## 2020-06-02 DIAGNOSIS — E11.65 TYPE 2 DIABETES MELLITUS WITH HYPERGLYCEMIA: ICD-10-CM

## 2020-06-02 DIAGNOSIS — E78.2 MIXED HYPERLIPIDEMIA: ICD-10-CM

## 2020-06-02 DIAGNOSIS — E03.9 HYPOTHYROIDISM, UNSPECIFIED: ICD-10-CM

## 2020-06-02 LAB
ANION GAP SERPL CALC-SCNC: 15 MMOL/L — SIGNIFICANT CHANGE UP (ref 5–17)
BUN SERPL-MCNC: 15 MG/DL — SIGNIFICANT CHANGE UP (ref 7–23)
CALCIUM SERPL-MCNC: 10.1 MG/DL — SIGNIFICANT CHANGE UP (ref 8.4–10.5)
CHLORIDE SERPL-SCNC: 106 MMOL/L — SIGNIFICANT CHANGE UP (ref 96–108)
CO2 SERPL-SCNC: 22 MMOL/L — SIGNIFICANT CHANGE UP (ref 22–31)
CREAT SERPL-MCNC: 0.62 MG/DL — SIGNIFICANT CHANGE UP (ref 0.5–1.3)
GAS PNL BLDA: SIGNIFICANT CHANGE UP
GLUCOSE BLDC GLUCOMTR-MCNC: 139 MG/DL — HIGH (ref 70–99)
GLUCOSE BLDC GLUCOMTR-MCNC: 179 MG/DL — HIGH (ref 70–99)
GLUCOSE BLDC GLUCOMTR-MCNC: 186 MG/DL — HIGH (ref 70–99)
GLUCOSE BLDC GLUCOMTR-MCNC: 259 MG/DL — HIGH (ref 70–99)
GLUCOSE BLDC GLUCOMTR-MCNC: 264 MG/DL — HIGH (ref 70–99)
GLUCOSE BLDC GLUCOMTR-MCNC: 309 MG/DL — HIGH (ref 70–99)
GLUCOSE SERPL-MCNC: 188 MG/DL — HIGH (ref 70–99)
HCT VFR BLD CALC: 27.4 % — LOW (ref 34.5–45)
HGB BLD-MCNC: 8 G/DL — LOW (ref 11.5–15.5)
MAGNESIUM SERPL-MCNC: 1.9 MG/DL — SIGNIFICANT CHANGE UP (ref 1.6–2.6)
MCHC RBC-ENTMCNC: 28 PG — SIGNIFICANT CHANGE UP (ref 27–34)
MCHC RBC-ENTMCNC: 29.2 GM/DL — LOW (ref 32–36)
MCV RBC AUTO: 95.8 FL — SIGNIFICANT CHANGE UP (ref 80–100)
NRBC # BLD: 0 /100 WBCS — SIGNIFICANT CHANGE UP (ref 0–0)
PHOSPHATE SERPL-MCNC: 4.3 MG/DL — SIGNIFICANT CHANGE UP (ref 2.5–4.5)
PLATELET # BLD AUTO: 375 K/UL — SIGNIFICANT CHANGE UP (ref 150–400)
POTASSIUM SERPL-MCNC: 4.3 MMOL/L — SIGNIFICANT CHANGE UP (ref 3.5–5.3)
POTASSIUM SERPL-SCNC: 4.3 MMOL/L — SIGNIFICANT CHANGE UP (ref 3.5–5.3)
RBC # BLD: 2.86 M/UL — LOW (ref 3.8–5.2)
RBC # FLD: 24.9 % — HIGH (ref 10.3–14.5)
SODIUM SERPL-SCNC: 143 MMOL/L — SIGNIFICANT CHANGE UP (ref 135–145)
WBC # BLD: 14.2 K/UL — HIGH (ref 3.8–10.5)
WBC # FLD AUTO: 14.2 K/UL — HIGH (ref 3.8–10.5)

## 2020-06-02 PROCEDURE — 70450 CT HEAD/BRAIN W/O DYE: CPT | Mod: 26

## 2020-06-02 PROCEDURE — 99222 1ST HOSP IP/OBS MODERATE 55: CPT

## 2020-06-02 PROCEDURE — 71045 X-RAY EXAM CHEST 1 VIEW: CPT | Mod: 26

## 2020-06-02 PROCEDURE — 99292 CRITICAL CARE ADDL 30 MIN: CPT

## 2020-06-02 PROCEDURE — 99291 CRITICAL CARE FIRST HOUR: CPT

## 2020-06-02 PROCEDURE — 95720 EEG PHY/QHP EA INCR W/VEEG: CPT

## 2020-06-02 RX ORDER — LACOSAMIDE 50 MG/1
100 TABLET ORAL
Refills: 0 | Status: DISCONTINUED | OUTPATIENT
Start: 2020-06-02 | End: 2020-06-03

## 2020-06-02 RX ORDER — LEVETIRACETAM 250 MG/1
1000 TABLET, FILM COATED ORAL
Refills: 0 | Status: DISCONTINUED | OUTPATIENT
Start: 2020-06-02 | End: 2020-07-11

## 2020-06-02 RX ORDER — HYDRALAZINE HCL 50 MG
10 TABLET ORAL ONCE
Refills: 0 | Status: COMPLETED | OUTPATIENT
Start: 2020-06-02 | End: 2020-06-02

## 2020-06-02 RX ORDER — FENTANYL CITRATE 50 UG/ML
50 INJECTION INTRAVENOUS ONCE
Refills: 0 | Status: DISCONTINUED | OUTPATIENT
Start: 2020-06-02 | End: 2020-06-03

## 2020-06-02 RX ORDER — HUMAN INSULIN 100 [IU]/ML
30 INJECTION, SUSPENSION SUBCUTANEOUS EVERY 6 HOURS
Refills: 0 | Status: DISCONTINUED | OUTPATIENT
Start: 2020-06-02 | End: 2020-06-02

## 2020-06-02 RX ORDER — LEVOTHYROXINE SODIUM 125 MCG
140 TABLET ORAL AT BEDTIME
Refills: 0 | Status: DISCONTINUED | OUTPATIENT
Start: 2020-06-02 | End: 2020-06-03

## 2020-06-02 RX ORDER — HUMAN INSULIN 100 [IU]/ML
35 INJECTION, SUSPENSION SUBCUTANEOUS EVERY 6 HOURS
Refills: 0 | Status: DISCONTINUED | OUTPATIENT
Start: 2020-06-02 | End: 2020-06-02

## 2020-06-02 RX ORDER — HUMAN INSULIN 100 [IU]/ML
40 INJECTION, SUSPENSION SUBCUTANEOUS EVERY 6 HOURS
Refills: 0 | Status: DISCONTINUED | OUTPATIENT
Start: 2020-06-02 | End: 2020-06-03

## 2020-06-02 RX ADMIN — LEVETIRACETAM 400 MILLIGRAM(S): 250 TABLET, FILM COATED ORAL at 05:52

## 2020-06-02 RX ADMIN — HUMAN INSULIN 35 UNIT(S): 100 INJECTION, SUSPENSION SUBCUTANEOUS at 11:15

## 2020-06-02 RX ADMIN — Medication 8: at 06:16

## 2020-06-02 RX ADMIN — Medication 140 MICROGRAM(S): at 22:20

## 2020-06-02 RX ADMIN — LACOSAMIDE 100 MILLIGRAM(S): 50 TABLET ORAL at 17:23

## 2020-06-02 RX ADMIN — HUMAN INSULIN 35 UNIT(S): 100 INJECTION, SUSPENSION SUBCUTANEOUS at 06:17

## 2020-06-02 RX ADMIN — HUMAN INSULIN 35 UNIT(S): 100 INJECTION, SUSPENSION SUBCUTANEOUS at 01:41

## 2020-06-02 RX ADMIN — Medication 2: at 17:38

## 2020-06-02 RX ADMIN — Medication 5 MILLIGRAM(S): at 22:23

## 2020-06-02 RX ADMIN — POLYETHYLENE GLYCOL 3350 17 GRAM(S): 17 POWDER, FOR SOLUTION ORAL at 05:51

## 2020-06-02 RX ADMIN — AMLODIPINE BESYLATE 10 MILLIGRAM(S): 2.5 TABLET ORAL at 05:52

## 2020-06-02 RX ADMIN — CHLORHEXIDINE GLUCONATE 15 MILLILITER(S): 213 SOLUTION TOPICAL at 17:23

## 2020-06-02 RX ADMIN — Medication 2: at 01:35

## 2020-06-02 RX ADMIN — POLYETHYLENE GLYCOL 3350 17 GRAM(S): 17 POWDER, FOR SOLUTION ORAL at 17:23

## 2020-06-02 RX ADMIN — Medication 6: at 10:48

## 2020-06-02 RX ADMIN — CHLORHEXIDINE GLUCONATE 1 APPLICATION(S): 213 SOLUTION TOPICAL at 22:21

## 2020-06-02 RX ADMIN — SIMVASTATIN 20 MILLIGRAM(S): 20 TABLET, FILM COATED ORAL at 22:21

## 2020-06-02 RX ADMIN — FENTANYL CITRATE 25 MICROGRAM(S): 50 INJECTION INTRAVENOUS at 01:35

## 2020-06-02 RX ADMIN — Medication 10 MILLIGRAM(S): at 01:57

## 2020-06-02 RX ADMIN — LEVETIRACETAM 1000 MILLIGRAM(S): 250 TABLET, FILM COATED ORAL at 17:23

## 2020-06-02 RX ADMIN — ENOXAPARIN SODIUM 40 MILLIGRAM(S): 100 INJECTION SUBCUTANEOUS at 17:23

## 2020-06-02 RX ADMIN — SENNA PLUS 2 TABLET(S): 8.6 TABLET ORAL at 22:21

## 2020-06-02 RX ADMIN — PANTOPRAZOLE SODIUM 40 MILLIGRAM(S): 20 TABLET, DELAYED RELEASE ORAL at 11:20

## 2020-06-02 RX ADMIN — Medication 12.5 MILLIGRAM(S): at 05:52

## 2020-06-02 RX ADMIN — CHLORHEXIDINE GLUCONATE 15 MILLILITER(S): 213 SOLUTION TOPICAL at 05:52

## 2020-06-02 RX ADMIN — Medication 15 MILLIGRAM(S): at 05:51

## 2020-06-02 RX ADMIN — Medication 12.5 MILLIGRAM(S): at 17:25

## 2020-06-02 RX ADMIN — Medication 6: at 14:57

## 2020-06-02 RX ADMIN — HUMAN INSULIN 40 UNIT(S): 100 INJECTION, SUSPENSION SUBCUTANEOUS at 17:38

## 2020-06-02 NOTE — CONSULT NOTE ADULT - PROBLEM SELECTOR RECOMMENDATION 4
-simvastatin 20mg po qhs  -Spent 40 minutes coordinating care. Discussed with neurosx fellow and patient's son and daughter.  Jia Jules MD  Pager: [phone], between 9am-6pm  Nights and Weekends: [phone]

## 2020-06-02 NOTE — CONSULT NOTE ADULT - PROBLEM SELECTOR RECOMMENDATION 2
-reviewed with her son that she should take her Synthroid an hour before her other pills. She was taking with other pills so would keep her on 175mcg po qdaily or 140mcg iv qhs. She was increased to 200mcg po qdaily during her last admission but that should not have happened.

## 2020-06-02 NOTE — OCCUPATIONAL THERAPY INITIAL EVALUATION ADULT - PERTINENT HX OF CURRENT PROBLEM, REHAB EVAL
65yo F with AMS. Pt. was confused, didn't recognize son.  He notes she was sleeping more than usual which tipped off son that something was wrong.  At 11 am pt. was gasping for air like she was seizing then later was dry heaving, her chest was going up and down and she started shaking like she was having a seizure.  Pt. has no history of seizures per son. CT head at OSH shows 5.5cm left frontal ICH, SAH and IVH.

## 2020-06-02 NOTE — CONSULT NOTE ADULT - ASSESSMENT
Impression:    Abdominal surgical wound - chronic     Recommend:   1.) topical therapy: abd wound - cleanse with NS, pat dry, apply cavilon skin barrier daily  2.) Nutrition consult for optimization  3.) Glycemic control    Care as per medicine will follow w/ you  Upon discharge [facility] [address] [place] [address] [phone]  Discussed with clinical nurse  Thank you for this consult  Lori Burton, NP-C, CWOCN [id number]

## 2020-06-02 NOTE — EEG REPORT - NS EEG TEXT BOX
[facility]   COMPREHENSIVE EPILEPSY CENTER   REPORT OF CONTINUOUS VIDEO EEG     [facility]: [address], [place] [address], Ph#: [phone]  [facility]: [address], [place] [address], Ph#: [phone]  [facility]: [address] [place] [address], Ph#: [phone]    Patient Name: AFTAB BASS  Age and : 64y ([birth year])  MRN #: [id number]  Location: [place] [address]  Referring Physician: Kimo Thomason    Start Time/Date: 08:0 on 2020   Duration: 5h 16m    _____________________________________________________________  STUDY INFORMATION    EEG Recording Technique:  The patient underwent continuous Video-EEG monitoring, using Telemetry System hardware on the XLTek Digital System. EEG and video data were stored on a computer hard drive with important events saved in digital archive files. The material was reviewed by a physician (electroencephalographer / epileptologist) on a daily basis. Moy and seizure detection algorithms were utilized and reviewed. An EEG Technician attended to the patient, and was available throughout daytime work hours.  The epilepsy center neurologist was available in person or on call 24-hours per day.    EEG Placement and Labeling of Electrodes:  The EEG was performed utilizing 20 channel referential EEG connections (coronal over temporal over parasagittal montage) using all standard 10-20 electrode placements with EKG, with additional electrodes placed in the inferior temporal region using the modified 10-10 montage electrode placements for elective admissions, or if deemed necessary. Recording was at a sampling rate of 256 samples per second per channel. Time synchronized digital video recording was done simultaneously with EEG recording. A low light infrared camera was used for low light recording.     _____________________________________________________________  HISTORY    Patient is a 64y old  Female who presents with a chief complaint of ICH, SAH w/ IVH (31 May 2020 06:43)      PERTINENT MEDICATION:  levETIRAcetam  IVPB 1000 milliGRAM(s) IV Intermittent every 12 hours     _____________________________________________________________  STUDY INTERPRETATION    Findings: The background was spontaneously variable, reactive, and near-continuous, with occasional periods of intermittent 1s of diffuse suppression. No posterior dominant rhythm seen.    Background Slowing:  Diffuse theta and polymorphic delta slowing.    Focal Slowing:   None were present.    Sleep Background:  Stage II sleep transients were not recorded but there were state changes.    Other Non-Epileptiform Findings:  None were present.    Interictal Epileptiform Activity:   Frequent brief runs of 0.5-1Hz generalized periodic discharges, bluntly contoured, maximal in the bilateral frontotemporal regions, with shifting laterality but more prominent over the right.     Events:  Clinical events: None recorded.  Seizures: None recorded.    Activation Procedures:   Hyperventilation was not performed.    Photic stimulation was not performed.     Artifacts:  Intermittent myogenic and movement artifacts were noted.    ECG:  The heart rate channel was artifactual    _____________________________________________________________  EEG SUMMARY/CLASSIFICATION    Abnormal EEG in an altered patient.  Data was only available up to 1:26pm.  - Frequent brief runs of 0.5-1Hz generalized periodic discharges, maximal in the left and right frontotemporal regions, with shifting laterality, more prominent over the right.   - Moderate to severe generalized slowing.    _____________________________________________________________  EEG IMPRESSION/CLINICAL CORRELATE    Abnormal EEG study.  Diffuse cortical hyperexcitability, maximal in the bilateral fronto-temporal, right>left regions.   Moderate to severe nonspecific diffuse or multifocal cerebral dysfunction.   No seizure seen.  _____________________________________________________________    Delfin Campos MD PhD  Epilepsy Attending [facility]   COMPREHENSIVE EPILEPSY CENTER   REPORT OF CONTINUOUS VIDEO EEG     [facility]: [address], [place] [address], Ph#: [phone]  [facility]: [address], [place] [address], Ph#: [phone]  [facility]: [address] [place] [address], Ph#: [phone]    Patient Name: AFTAB BASS  Age and : 64y ([birth year])  MRN #: [id number]  Location: [place] [address]  Referring Physician: Kimo Thomason    Start Time/Date: 08:0 on 2020   Duration: 24h     _____________________________________________________________  STUDY INFORMATION    EEG Recording Technique:  The patient underwent continuous Video-EEG monitoring, using Telemetry System hardware on the XLTek Digital System. EEG and video data were stored on a computer hard drive with important events saved in digital archive files. The material was reviewed by a physician (electroencephalographer / epileptologist) on a daily basis. Moy and seizure detection algorithms were utilized and reviewed. An EEG Technician attended to the patient, and was available throughout daytime work hours.  The epilepsy center neurologist was available in person or on call 24-hours per day.    EEG Placement and Labeling of Electrodes:  The EEG was performed utilizing 20 channel referential EEG connections (coronal over temporal over parasagittal montage) using all standard 10-20 electrode placements with EKG, with additional electrodes placed in the inferior temporal region using the modified 10-10 montage electrode placements for elective admissions, or if deemed necessary. Recording was at a sampling rate of 256 samples per second per channel. Time synchronized digital video recording was done simultaneously with EEG recording. A low light infrared camera was used for low light recording.     _____________________________________________________________  HISTORY    Patient is a 64y old  Female who presents with a chief complaint of ICH, SAH w/ IVH (31 May 2020 06:43)      PERTINENT MEDICATION:  levETIRAcetam  IVPB 1000 milliGRAM(s) IV Intermittent every 12 hours     _____________________________________________________________  STUDY INTERPRETATION    Findings: The background was spontaneously variable, reactive, and near-continuous, with occasional periods of intermittent 1s of diffuse suppression. No posterior dominant rhythm seen.    Background Slowing:  Diffuse theta and polymorphic delta slowing.    Focal Slowing:   Subtle left frontal delta slowing polymorphic    Sleep Background:  Stage II sleep transients were not recorded but there were state changes.    Other Non-Epileptiform Findings:  None were present.    Interictal Epileptiform Activity:   Frequent brief runs of 0.5-1Hz generalized periodic discharges, bluntly contoured, maximal in the bilateral frontotemporal regions, with shifting laterality but more prominent over the right.     Events:  Clinical events: None recorded.  Seizures: None recorded.    Activation Procedures:   Hyperventilation was not performed.    Photic stimulation was not performed.     Artifacts:  Intermittent myogenic and movement artifacts were noted.    ECG:  The heart rate channel was artifactual    _____________________________________________________________  EEG SUMMARY/CLASSIFICATION    Abnormal EEG in an altered patient.  - Frequent brief runs of 0.5-1Hz generalized periodic discharges, maximal in the left and right frontotemporal regions, with shifting laterality, more prominent over the right.   - Subtle left frontal delta slowing polymorphic  - Moderate to severe generalized slowing.    _____________________________________________________________  EEG IMPRESSION/CLINICAL CORRELATE    Abnormal EEG study.  Diffuse cortical hyperexcitability, maximal in the bilateral fronto-temporal, right>left regions.   Moderate to severe nonspecific diffuse or multifocal cerebral dysfunction.   Left frontal structural or functional cerebral dysfunction.  No seizure seen.  _____________________________________________________________    Delfin Campos MD PhD  Epilepsy Attending

## 2020-06-02 NOTE — PROGRESS NOTE ADULT - ASSESSMENT
Left frontal intracerebral hemorrhage with cerebral edema  - cEEG, continue antiepileptics  - -160mmHg  - f/u with family re: trach/PEG if exam does not improve  - diabetes mellitus II, uncontrolled on NPH; adjust dose as needed for goal -180  - VTE chemoppx

## 2020-06-02 NOTE — PROGRESS NOTE ADULT - SUBJECTIVE AND OBJECTIVE BOX
64 year old female with Pmhx significant for Hypothyroidism, HTN, IDDM, COPD/CHRIS, RA 9on Prednisone), SBO s/p ex lap with lysis of adhesions c/b evisceration,  recent hospitalization in 3/2020 with encephalopathy, significantly hypothyroid (, without myxedema coma), small (stable) right sided SDH, UTI, catatonic reaction to VPA (now resolved) and DKA and psychiatric issues, 2020 hospitalization for GIB, Emphysematous cystitis, endometritis, Sigmoid colitis, morbid obesity, functional quadriplegia.  Presented on  with altered mental status.  Patient was confused, didn't recognize son. At 11 am she was gasping for air, ?seizure	    HCP: Norris Rush, Son [phone]; Daughter, Heavenly Schmidt [phone]; son asks that only him or his sister, Heavenly be contacted for patient updates/information as they are both proxy's for patient (29 May 2020 23:41)    OVERNIGHT EVENTS: converted to NPH from Insulin gtt    ADMISSION SCORES:   GCS: HH: MF: NIHSS: ICH Score:     ALLERGIES: Depakote (Other)  Seroquel (Other (Severe))    VITALS:  T(C): 37 (2020 03:00), Max: 37.7 (2020 07:00)  T(F): 98.6 (2020 03:00), Max: 99.9 (2020 07:00)  HR: 87 (2020 06:00) (67 - 120)  BP: --  BP(mean): --  ABP: 151/63 (2020 06:00) (144/64 - 193/95)  ABP(mean): 89 (2020 06:00) (83 - 133)  RR: 20 (2020 18:00) (19 - 22)  SpO2: 100% (2020 06:00) (99% - 100%)      DEVICES:   [] Restraints [X] ET tube [X] central line [X] arterial line [] iraheta [] NGT/OGT [] EVD [] [] LD [] MABEL/HMV [] Trach [] PEG [] Chest Tube [] other:      LABS:                         8.0      14.20 )-----------( 375      ( 2020 00:27 )             27.4     06-02    143  |  106  |  15  ----------------------------<  188<H>  4.3   |  22  |  0.62    Ca    10.1      2020 00:27  Phos  4.3     06-02  Mg     1.9     06-02    ABG - ( 2020 00:23 )  pH, Arterial: 7.46  pH, Blood: x     /  pCO2: 36    /  pO2: 130   / HCO3: 25    / Base Excess: 1.9   /  SaO2: 100       COVID-19 PCR: NotDetec (30 May 2020 20:37)    Culture - Urine (20 @ 18:13)    Specimen Source: .Urine Catheterized    Culture Results:   <10,000 CFU/mL Normal Urogenital Perla    Culture - Blood (20 @ 05:38)    Specimen Source: .Blood Blood-Peripheral    Culture Results:   No growth to date.    Culture - Blood (20 @ 05:38)    Specimen Source: .Blood Blood-Peripheral    Culture Results:   No growth to date.    CAPILLARY BLOOD GLUCOSE  POCT Blood Glucose.: 309 mg/dL (2020 05:58)  POCT Blood Glucose.: 186 mg/dL (2020 00:52)  POCT Blood Glucose.: 257 mg/dL (2020 21:46)  POCT Blood Glucose.: 297 mg/dL (2020 17:17)  POCT Blood Glucose.: 137 mg/dL (2020 13:07)  POCT Blood Glucose.: 138 mg/dL (2020 12:01)  POCT Blood Glucose.: 150 mg/dL (2020 11:02)  POCT Blood Glucose.: 166 mg/dL (2020 09:57)  POCT Blood Glucose.: 151 mg/dL (2020 09:00)  POCT Blood Glucose.: 168 mg/dL (2020 08:02)      VENT:  Mode: AC/ CMV (Assist Control/ Continuous Mandatory Ventilation)  RR (machine): 14  TV (machine): 450  FiO2: 30  PEEP: 5  ITime: 0.7  MAP: 11  PIP: 24      EXAMINATION:  General: No acute distress, Intubated  HEENT: Anicteric sclerae  Cardiac: [id number]  Lungs: Clear  Abdomen: Soft, non-tender, +BS  Extremities: No c/c/e  Skin/Incision Site: Clean, dry and intact  Neurologic: Eyes closed, no opening to noxious, pupils reactive, +cough/gag, does not breathe above set rate of 14 but does breathe spontaneously when rate lowered, trace movement to noxious in RUE, w/d LUE, TF b/l LEs      MEDICATIONS:   MEDICATIONS  (STANDING):  amLODIPine   Tablet 10 milliGRAM(s) Oral daily  chlorhexidine 0.12% Liquid 15 milliLiter(s) Oral Mucosa every 12 hours  chlorhexidine 4% Liquid 1 Application(s) Topical <User Schedule>  dextrose 5%. 1000 milliLiter(s) (50 mL/Hr) IV Continuous <Continuous>  dextrose 50% Injectable 12.5 Gram(s) IV Push once  dextrose 50% Injectable 25 Gram(s) IV Push once  dextrose 50% Injectable 25 Gram(s) IV Push once  enoxaparin Injectable 40 milliGRAM(s) SubCutaneous <User Schedule>  insulin lispro (HumaLOG) corrective regimen sliding scale   SubCutaneous every 4 hours  insulin NPH human recombinant 35 Unit(s) SubCutaneous every 6 hours  levETIRAcetam  IVPB 1000 milliGRAM(s) IV Intermittent every 12 hours  levothyroxine Injectable 150 MICROGram(s) IV Push at bedtime  metoprolol tartrate 12.5 milliGRAM(s) Oral two times a day  pantoprazole  Injectable 40 milliGRAM(s) IV Push daily  polyethylene glycol 3350 17 Gram(s) Oral every 12 hours  predniSONE   Tablet 15 milliGRAM(s) Oral <User Schedule>  senna 2 Tablet(s) Oral at bedtime  simvastatin 20 milliGRAM(s) Oral at bedtime    MEDICATIONS  (PRN):  acetaminophen    Suspension .. 650 milliGRAM(s) Oral every 6 hours PRN Temp greater or equal to 38C (100.4F), Mild Pain (1 - 3)  dextrose 40% Gel 15 Gram(s) Oral once PRN Blood Glucose LESS THAN 70 milliGRAM(s)/deciliter  fentaNYL    Injectable 25 MICROGram(s) IV Push every 2 hours PRN Severe Pain (7 - 10)  glucagon  Injectable 1 milliGRAM(s) IntraMuscular once PRN Glucose LESS THAN 70 milligrams/deciliter  sodium chloride 0.9% lock flush 10 milliLiter(s) IV Push every 1 hour PRN Pre/post blood products, medications, blood draw, and to maintain line patency      RADIOLOGY:   CT Head No Cont (20 @ 08:23)   Evidence of left frontal parenchymal hematoma with mass effect on the left lateral ventricle and intraventricular extension with blood layering in the occipital horns. No hydrocephalus. No change in the hematoma.      Xray Chest 1 View- PORTABLE-Urgent (20 @ 08:19)   The heart is normal in size. The lungs appear to be clear. Endotracheal tube is in good position. A central line seen on the right and the tip is in superior vena cava. No pneumothorax.      VA Duplex Lower Ext Vein Scan, Bilat (20 @ 17:40)  No evidence of deep venous thrombosis in either lower extremity.      CT Angio Head w/ IV Cont (20 @ 11:15)   CT venogram demonstrates no evidence of venous sinus thrombosis.      EE/1: Abnormal EEG in an altered patient.  - Frequent brief runs of 0.5-1Hz generalized periodic discharges, maximal in the left and right frontotemporal regions, with shifting laterality.   - Moderate to severe generalized slowing.    _____________________________________________________________  EEG IMPRESSION/CLINICAL CORRELATE    Abnormal EEG study.  Diffuse cortical hyperexcitability, maximal in the bilateral temporal regions.   Moderate to severe nonspecific diffuse or multifocal cerebral dysfunction.   No seizure seen.          TTE:   Transthoracic Echocardiogram (20 @ 11:31)   Mitral Valve: Normal mitral valve.  Aortic Valve/Aorta: Normal aortic valve.  Normal aortic root size.  Left Atrium: Normal left atrium.  Left Ventricle: Normal left ventricular internal dimensions  and wall thicknesses.  Hyperdynamic left ventricle. Intracavitary gradient  present.  Normal diastolic function.  Right Heart: Normal right atrium. Normal right ventricular  size and systolic function. Normal tricuspid valve. Mild  tricuspid regurgitation. Normal pulmonic valve. Minimal  pulmonic regurgitation.  Pericardium/Pleura: Normal pericardium with no pericardial  effusion.  Hemodynamic: No evidence ofpulmonary hypertension.  Agitated saline injection and color flow Doppler  demonstrate no evidence of a patent foramen ovale. 64 year old female with Pmhx significant for Hypothyroidism, HTN, IDDM, COPD/CHRIS, RA 9on Prednisone), SBO s/p ex lap with lysis of adhesions c/b evisceration,  recent hospitalization in 3/2020 with encephalopathy, significantly hypothyroid (, without myxedema coma), small (stable) right sided SDH, UTI, catatonic reaction to VPA (now resolved) and DKA and psychiatric issues, 2020 hospitalization for GIB, Emphysematous cystitis, endometritis, Sigmoid colitis, morbid obesity, functional quadriplegia.  Presented on  with altered mental status.  Patient was confused, didn't recognize son. At 11 am she was gasping for air, ?seizure	    HCP: Norris Rush, Son [phone]; Daughter, Heavenly Schmidt [phone]; son asks that only him or his sister, Heavenly be contacted for patient updates/information as they are both proxy's for patient (29 May 2020 23:41)    OVERNIGHT EVENTS: converted to NPH from Insulin gtt    ADMISSION SCORES:   GCS: HH: MF: NIHSS: ICH Score:     ALLERGIES: Depakote (Other)  Seroquel (Other (Severe))    VITALS:  T(C): 37 (2020 03:00), Max: 37.7 (2020 07:00)  T(F): 98.6 (2020 03:00), Max: 99.9 (2020 07:00)  HR: 87 (2020 06:00) (67 - 120)  BP: --  BP(mean): --  ABP: 151/63 (2020 06:00) (144/64 - 193/95)  ABP(mean): 89 (2020 06:00) (83 - 133)  RR: 20 (2020 18:00) (19 - 22)  SpO2: 100% (2020 06:00) (99% - 100%)      DEVICES:   [] Restraints [X] ET tube [X] central line [X] arterial line [] iraheta [] NGT/OGT [] EVD [] [] LD [] MABEL/HMV [] Trach [] PEG [] Chest Tube [] other:      LABS:                         8.0      14.20 )-----------( 375      ( 2020 00:27 )             27.4     06-02    143  |  106  |  15  ----------------------------<  188<H>  4.3   |  22  |  0.62    Ca    10.1      2020 00:27  Phos  4.3     06-02  Mg     1.9     06-02    ABG - ( 2020 00:23 )  pH, Arterial: 7.46  pH, Blood: x     /  pCO2: 36    /  pO2: 130   / HCO3: 25    / Base Excess: 1.9   /  SaO2: 100       COVID-19 PCR: NotDetec (30 May 2020 20:37)    Culture - Urine (20 @ 18:13)    Specimen Source: .Urine Catheterized    Culture Results:   <10,000 CFU/mL Normal Urogenital Perla    Culture - Blood (20 @ 05:38)    Specimen Source: .Blood Blood-Peripheral    Culture Results:   No growth to date.    Culture - Blood (20 @ 05:38)    Specimen Source: .Blood Blood-Peripheral    Culture Results:   No growth to date.    CAPILLARY BLOOD GLUCOSE  POCT Blood Glucose.: 309 mg/dL (2020 05:58)  POCT Blood Glucose.: 186 mg/dL (2020 00:52)  POCT Blood Glucose.: 257 mg/dL (2020 21:46)  POCT Blood Glucose.: 297 mg/dL (2020 17:17)  POCT Blood Glucose.: 137 mg/dL (2020 13:07)  POCT Blood Glucose.: 138 mg/dL (2020 12:01)  POCT Blood Glucose.: 150 mg/dL (2020 11:02)  POCT Blood Glucose.: 166 mg/dL (2020 09:57)  POCT Blood Glucose.: 151 mg/dL (2020 09:00)  POCT Blood Glucose.: 168 mg/dL (2020 08:02)      VENT:  Mode: AC/ CMV (Assist Control/ Continuous Mandatory Ventilation)  RR (machine): 14  TV (machine): 450  FiO2: 30  PEEP: 5  ITime: 0.7  MAP: 11  PIP: 24      EXAMINATION:  General: No acute distress, Intubated  HEENT: Anicteric sclerae  Cardiac: [id number]  Lungs: Clear  Abdomen: Soft, non-tender, +BS  Extremities: No c/c/e  Skin/Incision Site: Clean, dry and intact  Neurologic: Eyes closed, left gaze preference NARCISO, opening to noxious, pupils reactive, +cough/gag, breathe above set rate of 14 , moves spontaneously the left UE,  trace movement of the  RUE, TF b/l LEs      MEDICATIONS:   MEDICATIONS  (STANDING):  amLODIPine   Tablet 10 milliGRAM(s) Oral daily  chlorhexidine 0.12% Liquid 15 milliLiter(s) Oral Mucosa every 12 hours  chlorhexidine 4% Liquid 1 Application(s) Topical <User Schedule>  dextrose 5%. 1000 milliLiter(s) (50 mL/Hr) IV Continuous <Continuous>  dextrose 50% Injectable 12.5 Gram(s) IV Push once  dextrose 50% Injectable 25 Gram(s) IV Push once  dextrose 50% Injectable 25 Gram(s) IV Push once  enoxaparin Injectable 40 milliGRAM(s) SubCutaneous <User Schedule>  insulin lispro (HumaLOG) corrective regimen sliding scale   SubCutaneous every 4 hours  insulin NPH human recombinant 35 Unit(s) SubCutaneous every 6 hours  levETIRAcetam  IVPB 1000 milliGRAM(s) IV Intermittent every 12 hours  levothyroxine Injectable 150 MICROGram(s) IV Push at bedtime  metoprolol tartrate 12.5 milliGRAM(s) Oral two times a day  pantoprazole  Injectable 40 milliGRAM(s) IV Push daily  polyethylene glycol 3350 17 Gram(s) Oral every 12 hours  predniSONE   Tablet 15 milliGRAM(s) Oral <User Schedule>  senna 2 Tablet(s) Oral at bedtime  simvastatin 20 milliGRAM(s) Oral at bedtime    MEDICATIONS  (PRN):  acetaminophen    Suspension .. 650 milliGRAM(s) Oral every 6 hours PRN Temp greater or equal to 38C (100.4F), Mild Pain (1 - 3)  dextrose 40% Gel 15 Gram(s) Oral once PRN Blood Glucose LESS THAN 70 milliGRAM(s)/deciliter  fentaNYL    Injectable 25 MICROGram(s) IV Push every 2 hours PRN Severe Pain (7 - 10)  glucagon  Injectable 1 milliGRAM(s) IntraMuscular once PRN Glucose LESS THAN 70 milligrams/deciliter  sodium chloride 0.9% lock flush 10 milliLiter(s) IV Push every 1 hour PRN Pre/post blood products, medications, blood draw, and to maintain line patency      RADIOLOGY:   CT Head No Cont (20 @ 08:23)   Evidence of left frontal parenchymal hematoma with mass effect on the left lateral ventricle and intraventricular extension with blood layering in the occipital horns. No hydrocephalus. No change in the hematoma.      Xray Chest 1 View- PORTABLE-Urgent (20 @ 08:19)   The heart is normal in size. The lungs appear to be clear. Endotracheal tube is in good position. A central line seen on the right and the tip is in superior vena cava. No pneumothorax.      VA Duplex Lower Ext Vein Scan, Bilat (20 @ 17:40)  No evidence of deep venous thrombosis in either lower extremity.      CT Angio Head w/ IV Cont (20 @ 11:15)   CT venogram demonstrates no evidence of venous sinus thrombosis.      EE/1: Abnormal EEG in an altered patient.  - Frequent brief runs of 0.5-1Hz generalized periodic discharges, maximal in the left and right frontotemporal regions, with shifting laterality.   - Moderate to severe generalized slowing.    _____________________________________________________________  EEG IMPRESSION/CLINICAL CORRELATE    Abnormal EEG study.  Diffuse cortical hyperexcitability, maximal in the bilateral temporal regions.   Moderate to severe nonspecific diffuse or multifocal cerebral dysfunction.   No seizure seen.          TTE:   Transthoracic Echocardiogram (20 @ 11:31)   Mitral Valve: Normal mitral valve.  Aortic Valve/Aorta: Normal aortic valve.  Normal aortic root size.  Left Atrium: Normal left atrium.  Left Ventricle: Normal left ventricular internal dimensions  and wall thicknesses.  Hyperdynamic left ventricle. Intracavitary gradient  present.  Normal diastolic function.  Right Heart: Normal right atrium. Normal right ventricular  size and systolic function. Normal tricuspid valve. Mild  tricuspid regurgitation. Normal pulmonic valve. Minimal  pulmonic regurgitation.  Pericardium/Pleura: Normal pericardium with no pericardial  effusion.  Hemodynamic: No evidence ofpulmonary hypertension.  Agitated saline injection and color flow Doppler  demonstrate no evidence of a patent foramen ovale.

## 2020-06-02 NOTE — DISCHARGE NOTE NURSING/CASE MANAGEMENT/SOCIAL WORK - PATIENT PORTAL LINK FT
You can access the FollowMyHealth Patient Portal offered by [facility] by registering at the following website: [email or web address]. By joining [facility]’s FollowMyHealth portal, you will also be able to view your health information using other applications (apps) compatible with our system.

## 2020-06-02 NOTE — PROGRESS NOTE ADULT - ASSESSMENT
ASSESSMENT/PLAN:  64 year old female with Pmhx significant for Hypothyroidism, HTN, IDDM, COPD/CHRIS, RA 9on Prednisone), SBO s/p ex lap with lysis of adhesions c/b evisceration,  recent hospitalization in 3/2020 with encephalopathy, significantly hypothyroid (, without myxedema coma), small (stable) right sided SDH, UTI, catatonic reaction to VPA (now resolved) and DKA and psychiatric issues, 5/2020 hospitalization for GIB, Emphysematous cystitis, endometritis, Sigmoid colitis, morbid obesity, functional quadriplegia.  Presented on 5/29 with altered mental status.  Patient was confused, didn't recognize son. At 11 am she was gasping for air, ?seizure	    HCP: Norris Rush, Son [phone]; Daughter, Heavenly Schmidt [phone]; son asks that only him or his sister, Heavenly be contacted for patient updates/information as they are both proxy's for patient (29 May 2020 23:41)    NEURO:  -Q1 neuro checks  -VEEG in progress - runs of GPEDS, will continue monitoring   -keppra for ?seizure on presentation  -Sedation fentanyl PRN   -tylenol prn for pain control  Activity: [] OOB as tolerated [X] Bedrest [X] PT [X] OT [] PMNR    PULM:  -Intubated since 5/29  -will discuss trach with family  -History of COPD was on prednisone at home, now on prednisone 15 mg daily   will re-evalute if she was on it at home and for how long   -CPAP as tolerated      CV:  --160  -TTE negative for PFO  -on norvasc 10 and metoprolol 12.5 BID   -continue home dose zocor    RENAL:  -Na goal 145-155  -IVL       GI:  Diet: On Glucerna Tube feeds  -Will discuss peg with family  GI prophylaxis [] not indicated [X] PPI [] other:  Bowel regimen [X] miralax [X] senna [] other:    ENDO:   -continue NPH and sliding scale  -continue synthroid for hypothyroidism  -continue prednisone 15mg ( was on it at home)  Goal euglycemia (-180)    HEME/ONC:  - SQL  VTE prophylaxis: [X] SCDs [X] chemoprophylaxis [] high risk of DVT/PE on admission due to:    ID:  -afebrile  -panculture from 5/30 neg so far    MISC:    SOCIAL/FAMILY:  [X] updated family via phone  [] family meeting    CODE STATUS:  [X] Full Code [] DNR [] DNI [] Palliative/Comfort Care    DISPOSITION:  [X] ICU [] Stroke Unit [] Floor [] EMU [] RCU [] PCU    [X] Patient is at high risk of neurologic deterioration/death due to: resp failure    Time seen:  Time spent: _45__ [X] critical care minutes ASSESSMENT/PLAN:  64 year old female with Pmhx significant for Hypothyroidism, HTN, IDDM, COPD/CHRIS, RA 9on Prednisone), SBO s/p ex lap with lysis of adhesions c/b evisceration,  recent hospitalization in 3/2020 with encephalopathy, significantly hypothyroid (, without myxedema coma), small (stable) right sided SDH, UTI, catatonic reaction to VPA (now resolved) and DKA and psychiatric issues, 5/2020 hospitalization for GIB, Emphysematous cystitis, endometritis, Sigmoid colitis, morbid obesity, functional quadriplegia.  Presented on 5/29 with altered mental status.  Patient was confused, didn't recognize son. At 11 am she was gasping for air, ?seizure	    HCP: Norris Rush, Son [phone]; Daughter, Heavenly Schmidt [phone]; son asks that only him or his sister, Heavenly be contacted for patient updates/information as they are both proxy's for patient (29 May 2020 23:41)    NEURO:  -Q2 neuro checks  -VEEG in progress - runs of GPEDS, if EEG no sieuzres, will d/c EEG   -keppra for ?seizure on presentation  -Sedation fentanyl PRN   -tylenol prn for pain control  Activity: [] OOB as tolerated [X] Bedrest [X] PT [X] OT [] PMNR    PULM:  -Intubated since 5/29  -will discuss trach with family  -History of COPD was on prednisone at home, now on prednisone 15 mg daily   will re-evalute if she was on it at home and for how long   -CPAP as tolerated      CV:  --160  -TTE negative for PFO  -on norvasc 10 and metoprolol 12.5 BID   -continue home dose zocor    RENAL:  -Na goal 145-155  -IVL       GI:  Diet: On Glucerna Tube feeds  -Will discuss peg with family  GI prophylaxis [] not indicated [X] PPI [] other:  Bowel regimen [X] miralax [X] senna [] other:    ENDO:   -continue NPH 35 units q 6 hr and sliding scale, increase NPH to 40 u q 6 hr   -synthroid increased to 150 IV   -continue prednisone 15mg ( was on it at home)  Goal euglycemia (-180)    HEME/ONC:  - SQL  VTE prophylaxis: [X] SCDs [X] chemoprophylaxis [] high risk of DVT/PE on admission due to:    ID:  -afebrile  -panculture from 5/30 neg so far    MISC:    SOCIAL/FAMILY:  [X] updated family via phone  [] family meeting    CODE STATUS:  [X] Full Code [] DNR [] DNI [] Palliative/Comfort Care    DISPOSITION:  [X] ICU [] Stroke Unit [] Floor [] EMU [] RCU [] PCU    [X] Patient is at high risk of neurologic deterioration/death due to: resp failure    Time seen:  Time spent: _45__ [X] critical care minutes

## 2020-06-02 NOTE — PROGRESS NOTE ADULT - SUBJECTIVE AND OBJECTIVE BOX
EEG with GPEDs so lacosamide added. EEG with GPEDs so lacosamide added. NPH adjusted for hyperglycemia. EEG with GPEDs so lacosamide added. NPH adjusted for hyperglycemia.     Intubated, eyes open to deep noxious stimulus, pupils reactive, +corneal reflexes, +cough/gag, +overbreathes, no commands, left gaze preference, RUE trace movement, LUE intermittent spontaneous movement, b/L TF

## 2020-06-02 NOTE — CONSULT NOTE ADULT - SUBJECTIVE AND OBJECTIVE BOX
HPI: 63 yo female with T2D x 3-4 years uncontrolled while inpatient (HbA1c 7.4%) with no known complications here with mental status changes associated with shaking found to have a left frontal parenchymal hematoma with mass effect on the left lateral ventricle and intraventricular extension with blood layering in the occipital horns. Her son Norris notes that she began shaking and making odd noises, followed by l.o.c. He believes that she was out for a minute. He brought her back with CPR. The ambulance came about 30 minutes later. When they arrived she was back to normal. He believes that her bs 120 that morning.  The patient is well known to our team from her last admission when she was diagnosed with encephalopathy we were consulted for a TSH of 100. She went home with her son Norris. He has been helping her with her medications. She would take her levothyroxine 175mcg po qdaily with her other morning pills including a MVI and iron supplement. She also takes prednisone chronically at a dose of 15mg po qam and 5mg po qhs. For her diabetes she was taking Lantus 20 units sq qhs and Humalog correctional scale tid-ac. Norris would test her only in the morning as she would not allow for more testing.   Per her family she has been having more of her normal behavior.   Spoke with her son Norris Rush, Son [phone] and Daughter Heavenly Schmidt [phone]      PAST MEDICAL & SURGICAL HISTORY:  Diverticulosis  RA (Rheumatoid Arthritis)  Tooth Abscess  Arthritis  Cigarette Smoker  Chronic Asthma  Adult Hypothyroidism  Borderline Diabetes Mellitus  High Cholesterol  H/O: HTN  Wrist Disorder: S/P Surgery  History of  Section      FAMILY HISTORY:  Psych issues/dementia: denies  Thyroid disease: denies  Diabetes: siblings    Social History:  Tobacco: quit 2 years ago  Occupation: homemaker, on disability     Outpatient Medications: Lantus 20 units sq qhs and Humalog correctional scale tid-ac  Prednisone 15mg po qam and 5mg po qhs  Levothyroxine 200mcg po qdaily    MEDICATIONS  (STANDING):  amLODIPine   Tablet 10 milliGRAM(s) Oral daily  chlorhexidine 0.12% Liquid 15 milliLiter(s) Oral Mucosa every 12 hours  chlorhexidine 4% Liquid 1 Application(s) Topical <User Schedule>  dextrose 5%. 1000 milliLiter(s) (50 mL/Hr) IV Continuous <Continuous>  dextrose 50% Injectable 12.5 Gram(s) IV Push once  dextrose 50% Injectable 25 Gram(s) IV Push once  dextrose 50% Injectable 25 Gram(s) IV Push once  enoxaparin Injectable 40 milliGRAM(s) SubCutaneous <User Schedule>  fentaNYL    Injectable 50 MICROGram(s) IV Push once  insulin lispro (HumaLOG) corrective regimen sliding scale   SubCutaneous every 4 hours  insulin NPH human recombinant 40 Unit(s) SubCutaneous every 6 hours  lacosamide 100 milliGRAM(s) Oral two times a day  levETIRAcetam  Solution 1000 milliGRAM(s) Oral two times a day  levothyroxine Injectable 150 MICROGram(s) IV Push at bedtime  metoprolol tartrate 12.5 milliGRAM(s) Oral two times a day  pantoprazole  Injectable 40 milliGRAM(s) IV Push daily  polyethylene glycol 3350 17 Gram(s) Oral every 12 hours  predniSONE   Tablet 15 milliGRAM(s) Oral <User Schedule>  senna 2 Tablet(s) Oral at bedtime  simvastatin 20 milliGRAM(s) Oral at bedtime    MEDICATIONS  (PRN):  acetaminophen    Suspension .. 650 milliGRAM(s) Oral every 6 hours PRN Temp greater or equal to 38C (100.4F), Mild Pain (1 - 3)  dextrose 40% Gel 15 Gram(s) Oral once PRN Blood Glucose LESS THAN 70 milliGRAM(s)/deciliter  fentaNYL    Injectable 25 MICROGram(s) IV Push every 2 hours PRN Severe Pain (7 - 10)  glucagon  Injectable 1 milliGRAM(s) IntraMuscular once PRN Glucose LESS THAN 70 milligrams/deciliter  sodium chloride 0.9% lock flush 10 milliLiter(s) IV Push every 1 hour PRN Pre/post blood products, medications, blood draw, and to maintain line patency      Allergies  Depakote (Other)  Seroquel (Other (Severe))      Review of Systems:  UNABLE TO OBTAIN    PHYSICAL EXAM:  VITALS: T(C): 38 (20 @ 19:00)  T(F): 100.4 (20 @ 19:00), Max: 100.4 (20 @ 11:00)  HR: 87 (20 @ 19:00) (67 - 120)  BP: --  RR:  (17 - 25)  SpO2:  (99% - 100%)  Wt(kg): --  GENERAL: +intubated, sedated  EYES: Closed,  anicteric (opened her lids)  HEENT:  Atraumatic, Normocephalic, moist mucous membranes  RESPIRATORY: Clear to auscultation bilaterally; No rales, rhonchi, wheezing, or rubs  CARDIOVASCULAR: Regular rate and rhythm; No murmurs  GI: Soft, nontender, non distended, normal bowel sounds  SKIN: Dry, intact, +ecchymosis at ankles b/l  PSYCH: +sedated, +psychomotor retardation      POCT Blood Glucose.: 179 mg/dL (20 @ 17:29)  POCT Blood Glucose.: 264 mg/dL (20 @ 14:04)  POCT Blood Glucose.: 259 mg/dL (20 @ 10:41)  POCT Blood Glucose.: 309 mg/dL (20 @ 05:58)  POCT Blood Glucose.: 186 mg/dL (20 @ 00:52)  POCT Blood Glucose.: 257 mg/dL (20 @ 21:46)  POCT Blood Glucose.: 297 mg/dL (20 @ 17:17)  POCT Blood Glucose.: 137 mg/dL (20 @ 13:07)  POCT Blood Glucose.: 138 mg/dL (20 @ 12:01)  POCT Blood Glucose.: 150 mg/dL (20 @ 11:02)  POCT Blood Glucose.: 166 mg/dL (20 @ 09:57)  POCT Blood Glucose.: 151 mg/dL (20 @ 09:00)  POCT Blood Glucose.: 168 mg/dL (20 @ 08:02)  POCT Blood Glucose.: 179 mg/dL (20 @ 06:55)  POCT Blood Glucose.: 200 mg/dL (20 @ 06:14)  POCT Blood Glucose.: 201 mg/dL (20 @ 05:08)  POCT Blood Glucose.: 182 mg/dL (20 @ 03:59)  POCT Blood Glucose.: 141 mg/dL (20 @ 02:58)  POCT Blood Glucose.: 128 mg/dL (20 @ 02:04)  POCT Blood Glucose.: 154 mg/dL (20 @ 00:58)  POCT Blood Glucose.: 142 mg/dL (20 @ 00:04)  POCT Blood Glucose.: 137 mg/dL (20 @ 23:06)  POCT Blood Glucose.: 141 mg/dL (20 @ 22:02)  POCT Blood Glucose.: 145 mg/dL (20 @ 21:18)  POCT Blood Glucose.: 145 mg/dL (20 @ 20:06)  POCT Blood Glucose.: 149 mg/dL (20 @ 19:06)  POCT Blood Glucose.: 164 mg/dL (20 @ 18:12)  POCT Blood Glucose.: 180 mg/dL (20 @ 17:08)  POCT Blood Glucose.: 153 mg/dL (20 @ 16:04)  POCT Blood Glucose.: 182 mg/dL (20 @ 15:10)  POCT Blood Glucose.: 198 mg/dL (20 @ 14:03)  POCT Blood Glucose.: 250 mg/dL (20 @ 12:59)  POCT Blood Glucose.: 229 mg/dL (20 @ 12:03)  POCT Blood Glucose.: 230 mg/dL (20 @ 11:03)  POCT Blood Glucose.: 243 mg/dL (20 @ 10:05)  POCT Blood Glucose.: 208 mg/dL (20 @ 05:17)  POCT Blood Glucose.: 195 mg/dL (20 @ 23:14)                            8.0    14.20 )-----------( 375      ( 2020 00:27 )             27.4       06-02    143  |  106  |  15  ----------------------------<  188<H>  4.3   |  22  |  0.62    EGFR if : 112  EGFR if non : 97    Ca    10.1      06-02  Mg     1.9       Phos  4.3             Thyroid Function Tests:   @ 04:44 TSH 12.30 T3 51    @ 20:22 TSH 11.400           Chol 202<H> LDL 88 HDL 49<L> Trig 326<H>

## 2020-06-02 NOTE — CONSULT NOTE ADULT - ASSESSMENT
63 yo female with T2D uncontrolled while inpatient (HbA1c 7.4%) with no known complications and hypothyroidism here with mental status changes associated with shaking found to have a left frontal parenchymal hematoma with mass effect on the left lateral ventricle and intraventricular extension with blood layering in the occipital horns. Endocrine consulted for hyperglycemia.

## 2020-06-02 NOTE — CONSULT NOTE ADULT - SUBJECTIVE AND OBJECTIVE BOX
HPI:  65 yo F with AMS since this morning.  Pt. was confused, didn't recognize son this morning.  He notes she was sleeping more than usual yesterday, which tipped off son that something was wrong.  At 11 am pt. was gasping for air like she was seizing. 2 hours ago pt. was dry heaving, her chest was going up and down and she started shaking like she was having a seizure.  Pt. has no history of seizures per son.  		PMH: hypothyroidism, HTN, IDDM, COPD/CHRIS on home O2, RA (on Prednisone), SBO s/p ex lap with lysis of adhesions c/b evisceration,  recent hospitalization in 3/2020 with encephalopathy, significantly hypothyroid (, without myxedema coma), small (stable) right sided SDH, UTI, catatonic reaction to VPA (now resolved) and DKA and psychiatric issues, 2020 hospitalization for GIB, Emphysematous cystitis, endometritis, Sigmoid colitis, morbid obesity, functional quadriplegia    HCP: Norris Rush, Son [phone]; Daughter, Heavenly Schmidt [phone]; son asks that only him or his sister, Heavenly be contacted for patient updates/information as they are both proxy's for patient (29 May 2020 23:41)      PAST MEDICAL & SURGICAL HISTORY:  Diverticulosis  RA (Rheumatoid Arthritis)  Tooth Abscess  Arthritis  Cigarette Smoker  Chronic Asthma  Adult Hypothyroidism  Borderline Diabetes Mellitus  High Cholesterol  H/O: HTN  Wrist Disorder: S/P Surgery  History of  Section      FAMILY HISTORY:  No pertinent family history in first degree relatives      Social History:  Tobacco  ETOH  Illicits  Occupation    Outpatient Medications:    MEDICATIONS  (STANDING):  amLODIPine   Tablet 10 milliGRAM(s) Oral daily  chlorhexidine 0.12% Liquid 15 milliLiter(s) Oral Mucosa every 12 hours  chlorhexidine 4% Liquid 1 Application(s) Topical <User Schedule>  dextrose 5%. 1000 milliLiter(s) (50 mL/Hr) IV Continuous <Continuous>  dextrose 50% Injectable 12.5 Gram(s) IV Push once  dextrose 50% Injectable 25 Gram(s) IV Push once  dextrose 50% Injectable 25 Gram(s) IV Push once  enoxaparin Injectable 40 milliGRAM(s) SubCutaneous <User Schedule>  fentaNYL    Injectable 50 MICROGram(s) IV Push once  insulin lispro (HumaLOG) corrective regimen sliding scale   SubCutaneous every 4 hours  insulin NPH human recombinant 40 Unit(s) SubCutaneous every 6 hours  lacosamide 100 milliGRAM(s) Oral two times a day  levETIRAcetam  Solution 1000 milliGRAM(s) Oral two times a day  levothyroxine Injectable 150 MICROGram(s) IV Push at bedtime  metoprolol tartrate 12.5 milliGRAM(s) Oral two times a day  pantoprazole  Injectable 40 milliGRAM(s) IV Push daily  polyethylene glycol 3350 17 Gram(s) Oral every 12 hours  predniSONE   Tablet 15 milliGRAM(s) Oral <User Schedule>  senna 2 Tablet(s) Oral at bedtime  simvastatin 20 milliGRAM(s) Oral at bedtime    MEDICATIONS  (PRN):  acetaminophen    Suspension .. 650 milliGRAM(s) Oral every 6 hours PRN Temp greater or equal to 38C (100.4F), Mild Pain (1 - 3)  dextrose 40% Gel 15 Gram(s) Oral once PRN Blood Glucose LESS THAN 70 milliGRAM(s)/deciliter  fentaNYL    Injectable 25 MICROGram(s) IV Push every 2 hours PRN Severe Pain (7 - 10)  glucagon  Injectable 1 milliGRAM(s) IntraMuscular once PRN Glucose LESS THAN 70 milligrams/deciliter  sodium chloride 0.9% lock flush 10 milliLiter(s) IV Push every 1 hour PRN Pre/post blood products, medications, blood draw, and to maintain line patency      Allergies    Depakote (Other)  Seroquel (Other (Severe))    Intolerances      Review of Systems:  Constitutional: No fever, good appetite/po intake  Eyes: No blurry vision, diplopia  Neuro: No tremors  HEENT: No pain  Cardiovascular: No chest pain, palpitations  Respiratory: No SOB, no cough  GI: No nausea, vomiting,   : No dysuria, hematuria  Skin: no rash  Psych: no depression  Endocrine: no polyuria, polydipsia  Hem/lymph: no swelling  Osteoporosis: no fractures    ALL OTHER SYSTEMS REVIEWED AND NEGATIVE    UNABLE TO OBTAIN    PHYSICAL EXAM:  VITALS: T(C): 38 (20 @ 15:00)  T(F): 100.4 (20 @ 15:00), Max: 100.4 (20 @ 11:00)  HR: 100 (20 @ 16:00) (67 - 120)  BP: --  RR:  (17 - 25)  SpO2:  (99% - 100%)  Wt(kg): --  GENERAL: NAD, well-groomed, well-developed  EYES: No proptosis, no lid lag, anicteric  HEENT:  Atraumatic, Normocephalic, moist mucous membranes  THYROID: Normal size, no palpable nodules  RESPIRATORY: Clear to auscultation bilaterally; No rales, rhonchi, wheezing, or rubs  CARDIOVASCULAR: Regular rate and rhythm; No murmurs; no peripheral edema  GI: Soft, nontender, non distended, normal bowel sounds  SKIN: Dry, intact, No rashes or lesions  NEURO: sensation intact, extraocular movements intact, no tremor, normal reflexes  PSYCH: reactive affect, euthymic mood  CUSHING'S SIGNS: no striae    POCT Blood Glucose.: 264 mg/dL (20 @ 14:04)  POCT Blood Glucose.: 259 mg/dL (20 @ 10:41)  POCT Blood Glucose.: 309 mg/dL (20 @ 05:58)  POCT Blood Glucose.: 186 mg/dL (20 @ 00:52)  POCT Blood Glucose.: 257 mg/dL (20 @ 21:46)  POCT Blood Glucose.: 297 mg/dL (20 @ 17:17)  POCT Blood Glucose.: 137 mg/dL (20 @ 13:07)  POCT Blood Glucose.: 138 mg/dL (20 @ 12:01)  POCT Blood Glucose.: 150 mg/dL (20 @ 11:02)  POCT Blood Glucose.: 166 mg/dL (20 @ 09:57)  POCT Blood Glucose.: 151 mg/dL (20 @ 09:00)  POCT Blood Glucose.: 168 mg/dL (20 @ 08:02)  POCT Blood Glucose.: 179 mg/dL (20 @ 06:55)  POCT Blood Glucose.: 200 mg/dL (20 @ 06:14)  POCT Blood Glucose.: 201 mg/dL (20 @ 05:08)  POCT Blood Glucose.: 182 mg/dL (20 @ 03:59)  POCT Blood Glucose.: 141 mg/dL (20 @ 02:58)  POCT Blood Glucose.: 128 mg/dL (20 @ 02:04)  POCT Blood Glucose.: 154 mg/dL (20 @ 00:58)  POCT Blood Glucose.: 142 mg/dL (20 @ 00:04)  POCT Blood Glucose.: 137 mg/dL (20 @ 23:06)  POCT Blood Glucose.: 141 mg/dL (20 @ 22:02)  POCT Blood Glucose.: 145 mg/dL (20 @ 21:18)  POCT Blood Glucose.: 145 mg/dL (20 @ 20:06)  POCT Blood Glucose.: 149 mg/dL (20 @ 19:06)  POCT Blood Glucose.: 164 mg/dL (20 @ 18:12)  POCT Blood Glucose.: 180 mg/dL (20 @ 17:08)  POCT Blood Glucose.: 153 mg/dL (20 @ 16:04)  POCT Blood Glucose.: 182 mg/dL (20 @ 15:10)  POCT Blood Glucose.: 198 mg/dL (20 @ 14:03)  POCT Blood Glucose.: 250 mg/dL (20 @ 12:59)  POCT Blood Glucose.: 229 mg/dL (20 @ 12:03)  POCT Blood Glucose.: 230 mg/dL (20 @ 11:03)  POCT Blood Glucose.: 243 mg/dL (20 @ 10:05)  POCT Blood Glucose.: 208 mg/dL (20 @ 05:17)  POCT Blood Glucose.: 195 mg/dL (20 @ 23:14)  POCT Blood Glucose.: 197 mg/dL (20 @ 17:24)                            8.0    14.20 )-----------( 375      ( 2020 00:27 )             27.4       06-02    143  |  106  |  15  ----------------------------<  188<H>  4.3   |  22  |  0.62    EGFR if : 112  EGFR if non : 97    Ca    10.1        Mg     1.9       Phos  4.3             Thyroid Function Tests:   @ 04:44 TSH 12.30 FreeT4 -- T3 51 Anti TPO -- Anti Thyroglobulin Ab -- TSI --   @ 20:22 TSH 11.400 FreeT4 -- T3 -- Anti TPO -- Anti Thyroglobulin Ab -- TSI --           Chol 202<H> LDL 88 HDL 49<L> Trig 326<H>    Radiology:     A/P: yo male/female with hx of ................................... here with ..........

## 2020-06-02 NOTE — CONSULT NOTE ADULT - SUBJECTIVE AND OBJECTIVE BOX
Wound Surgery Consult Note:    HPI:  65 yo F with AMS since this morning.  Pt. was confused, didn't recognize son this morning.  He notes she was sleeping more than usual yesterday, which tipped off son that something was wrong.  At 11 am pt. was gasping for air like she was seizing. 2 hours ago pt. was dry heaving, her chest was going up and down and she started shaking like she was having a seizure.  Pt. has no history of seizures per son.  		  PMH: hypothyroidism, HTN, IDDM, COPD/CHRIS on home O2, RA (on Prednisone), SBO s/p ex lap with lysis of adhesions c/b evisceration,  recent hospitalization in 3/2020 with encephalopathy, significantly hypothyroid (, without myxedema coma), small (stable) right sided SDH, UTI, catatonic reaction to VPA (now resolved) and DKA and psychiatric issues, 2020 hospitalization for GIB, Emphysematous cystitis, endometritis, Sigmoid colitis, morbid obesity, functional quadriplegia    HCP: Norris Rush, Son [phone]; Daughter, Heavenly Schmidt [phone]; son asks that only him or his sister, Heavenly be contacted for patient updates/information as they are both proxy's for patient (29 May 2020 23:41)    Ms. Morrow was encountered on an alternating air with low air loss surface sedated, vented. She was not alert or responsive at all during my exam. Ms. Morrow is well known to the wound care team from prior admissions. Her abdominal wound is essentially healed and does not require a dressing. However, when Ms. Morrow is alert, she insists on application of a dressing of aquacel and tegederm every 7 days    PAST MEDICAL & SURGICAL HISTORY:  Diverticulosis  RA (Rheumatoid Arthritis)  Tooth Abscess  Arthritis  Cigarette Smoker  Chronic Asthma  Adult Hypothyroidism  Borderline Diabetes Mellitus  High Cholesterol  H/O: HTN  Wrist Disorder: S/P Surgery  History of  Section    REVIEW OF SYSTEMS  Unable to obtain due to patient's condition    MEDICATIONS  (STANDING):  amLODIPine   Tablet 10 milliGRAM(s) Oral daily  chlorhexidine 0.12% Liquid 15 milliLiter(s) Oral Mucosa every 12 hours  chlorhexidine 4% Liquid 1 Application(s) Topical <User Schedule>  dextrose 5%. 1000 milliLiter(s) (50 mL/Hr) IV Continuous <Continuous>  dextrose 50% Injectable 12.5 Gram(s) IV Push once  dextrose 50% Injectable 25 Gram(s) IV Push once  dextrose 50% Injectable 25 Gram(s) IV Push once  enoxaparin Injectable 40 milliGRAM(s) SubCutaneous <User Schedule>  insulin lispro (HumaLOG) corrective regimen sliding scale   SubCutaneous every 4 hours  insulin NPH human recombinant 40 Unit(s) SubCutaneous every 6 hours  levETIRAcetam  Solution 1000 milliGRAM(s) Oral two times a day  levothyroxine Injectable 150 MICROGram(s) IV Push at bedtime  metoprolol tartrate 12.5 milliGRAM(s) Oral two times a day  pantoprazole  Injectable 40 milliGRAM(s) IV Push daily  polyethylene glycol 3350 17 Gram(s) Oral every 12 hours  predniSONE   Tablet 15 milliGRAM(s) Oral <User Schedule>  senna 2 Tablet(s) Oral at bedtime  simvastatin 20 milliGRAM(s) Oral at bedtime    MEDICATIONS  (PRN):  acetaminophen    Suspension .. 650 milliGRAM(s) Oral every 6 hours PRN Temp greater or equal to 38C (100.4F), Mild Pain (1 - 3)  dextrose 40% Gel 15 Gram(s) Oral once PRN Blood Glucose LESS THAN 70 milliGRAM(s)/deciliter  fentaNYL    Injectable 25 MICROGram(s) IV Push every 2 hours PRN Severe Pain (7 - 10)  glucagon  Injectable 1 milliGRAM(s) IntraMuscular once PRN Glucose LESS THAN 70 milligrams/deciliter  sodium chloride 0.9% lock flush 10 milliLiter(s) IV Push every 1 hour PRN Pre/post blood products, medications, blood draw, and to maintain line patency    Allergies    Depakote (Other)  Seroquel (Other (Severe))    Intolerances    SOCIAL HISTORY:  unable to obtain due to patient's condition    FAMILY HISTORY:  No pertinent family history in first degree relatives    Vital Signs Last 24 Hrs  T(C): 38 (2020 11:00), Max: 38 (2020 11:00)  T(F): 100.4 (2020 11:00), Max: 100.4 (2020 11:00)  HR: 92 (2020 13:00) (67 - 120)  BP: --  BP(mean): --  RR: 18 (2020 12:00) (17 - 22)  SpO2: 100% (2020 13:00) (99% - 100%)    Physical Exam:  General: Obese, obtunded  Respiratory: orally intubated to vent  Gastrointestinal: soft, post op abdomen  Neurology: nonverbal, not alert, not following commands  Musculoskeletal: deformities of Bilateral hands with contracted fingers and extended wrists,  Vascular: no BLE edema, DP/PT pulses palpable, BLE equally warm, no acute ischemia noted  Skin:  abdominal surgical wound healed, with scattered small, superficial, dry crusts over newly epithelialized areas, + keloids and scar tissue, no drainage, No odor, erythema, increased warmth, tenderness, induration, fluctuance    LABS:      143  |  106  |  15  ----------------------------<  188<H>  4.3   |  22  |  0.62    Ca    10.1      2020 00:27  Phos  4.3     06-02  Mg     1.9     02                            8.0    14.20 )-----------( 375      ( 2020 00:27 )             27.4

## 2020-06-03 DIAGNOSIS — R53.2 FUNCTIONAL QUADRIPLEGIA: ICD-10-CM

## 2020-06-03 DIAGNOSIS — Z51.5 ENCOUNTER FOR PALLIATIVE CARE: ICD-10-CM

## 2020-06-03 DIAGNOSIS — G93.41 METABOLIC ENCEPHALOPATHY: ICD-10-CM

## 2020-06-03 DIAGNOSIS — J96.00 ACUTE RESPIRATORY FAILURE, UNSPECIFIED WHETHER WITH HYPOXIA OR HYPERCAPNIA: ICD-10-CM

## 2020-06-03 DIAGNOSIS — Z71.89 OTHER SPECIFIED COUNSELING: ICD-10-CM

## 2020-06-03 DIAGNOSIS — I61.9 NONTRAUMATIC INTRACEREBRAL HEMORRHAGE, UNSPECIFIED: ICD-10-CM

## 2020-06-03 LAB
ANION GAP SERPL CALC-SCNC: 14 MMOL/L — SIGNIFICANT CHANGE UP (ref 5–17)
APPEARANCE UR: CLEAR — SIGNIFICANT CHANGE UP
BACTERIA # UR AUTO: ABNORMAL
BILIRUB UR-MCNC: NEGATIVE — SIGNIFICANT CHANGE UP
BUN SERPL-MCNC: 16 MG/DL — SIGNIFICANT CHANGE UP (ref 7–23)
CALCIUM SERPL-MCNC: 10.1 MG/DL — SIGNIFICANT CHANGE UP (ref 8.4–10.5)
CHLORIDE SERPL-SCNC: 106 MMOL/L — SIGNIFICANT CHANGE UP (ref 96–108)
CO2 SERPL-SCNC: 25 MMOL/L — SIGNIFICANT CHANGE UP (ref 22–31)
COLOR SPEC: SIGNIFICANT CHANGE UP
CREAT SERPL-MCNC: 0.63 MG/DL — SIGNIFICANT CHANGE UP (ref 0.5–1.3)
DIFF PNL FLD: NEGATIVE — SIGNIFICANT CHANGE UP
EPI CELLS # UR: 0 /HPF — SIGNIFICANT CHANGE UP
GAS PNL BLDA: SIGNIFICANT CHANGE UP
GLUCOSE BLDC GLUCOMTR-MCNC: 173 MG/DL — HIGH (ref 70–99)
GLUCOSE BLDC GLUCOMTR-MCNC: 197 MG/DL — HIGH (ref 70–99)
GLUCOSE BLDC GLUCOMTR-MCNC: 207 MG/DL — HIGH (ref 70–99)
GLUCOSE BLDC GLUCOMTR-MCNC: 228 MG/DL — HIGH (ref 70–99)
GLUCOSE BLDC GLUCOMTR-MCNC: 88 MG/DL — SIGNIFICANT CHANGE UP (ref 70–99)
GLUCOSE BLDC GLUCOMTR-MCNC: 93 MG/DL — SIGNIFICANT CHANGE UP (ref 70–99)
GLUCOSE SERPL-MCNC: 93 MG/DL — SIGNIFICANT CHANGE UP (ref 70–99)
GLUCOSE UR QL: NEGATIVE — SIGNIFICANT CHANGE UP
HCT VFR BLD CALC: 25.6 % — LOW (ref 34.5–45)
HGB BLD-MCNC: 7.8 G/DL — LOW (ref 11.5–15.5)
HYALINE CASTS # UR AUTO: 1 /LPF — SIGNIFICANT CHANGE UP (ref 0–2)
KETONES UR-MCNC: NEGATIVE — SIGNIFICANT CHANGE UP
LEUKOCYTE ESTERASE UR-ACNC: ABNORMAL
LEVETIRACETAM SERPL-MCNC: 29.8 MCG/ML — SIGNIFICANT CHANGE UP (ref 12–46)
MAGNESIUM SERPL-MCNC: 1.9 MG/DL — SIGNIFICANT CHANGE UP (ref 1.6–2.6)
MCHC RBC-ENTMCNC: 28.8 PG — SIGNIFICANT CHANGE UP (ref 27–34)
MCHC RBC-ENTMCNC: 30.5 GM/DL — LOW (ref 32–36)
MCV RBC AUTO: 94.5 FL — SIGNIFICANT CHANGE UP (ref 80–100)
NITRITE UR-MCNC: NEGATIVE — SIGNIFICANT CHANGE UP
NRBC # BLD: 0 /100 WBCS — SIGNIFICANT CHANGE UP (ref 0–0)
PH UR: 7.5 — SIGNIFICANT CHANGE UP (ref 5–8)
PHOSPHATE SERPL-MCNC: 4.6 MG/DL — HIGH (ref 2.5–4.5)
PLATELET # BLD AUTO: 391 K/UL — SIGNIFICANT CHANGE UP (ref 150–400)
POTASSIUM SERPL-MCNC: 3.7 MMOL/L — SIGNIFICANT CHANGE UP (ref 3.5–5.3)
POTASSIUM SERPL-SCNC: 3.7 MMOL/L — SIGNIFICANT CHANGE UP (ref 3.5–5.3)
PROCALCITONIN SERPL-MCNC: 0.12 NG/ML — HIGH (ref 0.02–0.1)
PROT UR-MCNC: ABNORMAL
RBC # BLD: 2.71 M/UL — LOW (ref 3.8–5.2)
RBC # FLD: 23.8 % — HIGH (ref 10.3–14.5)
RBC CASTS # UR COMP ASSIST: 2 /HPF — SIGNIFICANT CHANGE UP (ref 0–4)
SODIUM SERPL-SCNC: 145 MMOL/L — SIGNIFICANT CHANGE UP (ref 135–145)
SP GR SPEC: 1.01 — SIGNIFICANT CHANGE UP (ref 1.01–1.02)
UROBILINOGEN FLD QL: NEGATIVE — SIGNIFICANT CHANGE UP
WBC # BLD: 13.73 K/UL — HIGH (ref 3.8–10.5)
WBC # FLD AUTO: 13.73 K/UL — HIGH (ref 3.8–10.5)
WBC UR QL: 44 /HPF — HIGH (ref 0–5)

## 2020-06-03 PROCEDURE — 99292 CRITICAL CARE ADDL 30 MIN: CPT

## 2020-06-03 PROCEDURE — 95720 EEG PHY/QHP EA INCR W/VEEG: CPT

## 2020-06-03 PROCEDURE — 99291 CRITICAL CARE FIRST HOUR: CPT

## 2020-06-03 PROCEDURE — 99223 1ST HOSP IP/OBS HIGH 75: CPT

## 2020-06-03 PROCEDURE — 71045 X-RAY EXAM CHEST 1 VIEW: CPT | Mod: 26

## 2020-06-03 PROCEDURE — 99232 SBSQ HOSP IP/OBS MODERATE 35: CPT | Mod: GC

## 2020-06-03 RX ORDER — CEFTRIAXONE 500 MG/1
INJECTION, POWDER, FOR SOLUTION INTRAMUSCULAR; INTRAVENOUS
Refills: 0 | Status: DISCONTINUED | OUTPATIENT
Start: 2020-06-03 | End: 2020-06-03

## 2020-06-03 RX ORDER — LACOSAMIDE 50 MG/1
100 TABLET ORAL ONCE
Refills: 0 | Status: DISCONTINUED | OUTPATIENT
Start: 2020-06-03 | End: 2020-06-03

## 2020-06-03 RX ORDER — LACOSAMIDE 50 MG/1
200 TABLET ORAL
Refills: 0 | Status: DISCONTINUED | OUTPATIENT
Start: 2020-06-03 | End: 2020-06-09

## 2020-06-03 RX ORDER — LEVOTHYROXINE SODIUM 125 MCG
150 TABLET ORAL AT BEDTIME
Refills: 0 | Status: DISCONTINUED | OUTPATIENT
Start: 2020-06-03 | End: 2020-06-04

## 2020-06-03 RX ORDER — CEFTRIAXONE 500 MG/1
1000 INJECTION, POWDER, FOR SOLUTION INTRAMUSCULAR; INTRAVENOUS EVERY 24 HOURS
Refills: 0 | Status: DISCONTINUED | OUTPATIENT
Start: 2020-06-04 | End: 2020-06-06

## 2020-06-03 RX ORDER — POTASSIUM CHLORIDE 20 MEQ
40 PACKET (EA) ORAL ONCE
Refills: 0 | Status: COMPLETED | OUTPATIENT
Start: 2020-06-03 | End: 2020-06-03

## 2020-06-03 RX ORDER — MAGNESIUM SULFATE 500 MG/ML
1 VIAL (ML) INJECTION ONCE
Refills: 0 | Status: COMPLETED | OUTPATIENT
Start: 2020-06-03 | End: 2020-06-03

## 2020-06-03 RX ORDER — HUMAN INSULIN 100 [IU]/ML
20 INJECTION, SUSPENSION SUBCUTANEOUS ONCE
Refills: 0 | Status: COMPLETED | OUTPATIENT
Start: 2020-06-03 | End: 2020-06-03

## 2020-06-03 RX ORDER — HUMAN INSULIN 100 [IU]/ML
42 INJECTION, SUSPENSION SUBCUTANEOUS EVERY 6 HOURS
Refills: 0 | Status: DISCONTINUED | OUTPATIENT
Start: 2020-06-03 | End: 2020-06-04

## 2020-06-03 RX ORDER — INSULIN LISPRO 100/ML
VIAL (ML) SUBCUTANEOUS EVERY 6 HOURS
Refills: 0 | Status: DISCONTINUED | OUTPATIENT
Start: 2020-06-03 | End: 2020-07-11

## 2020-06-03 RX ORDER — CEFTRIAXONE 500 MG/1
2000 INJECTION, POWDER, FOR SOLUTION INTRAMUSCULAR; INTRAVENOUS ONCE
Refills: 0 | Status: DISCONTINUED | OUTPATIENT
Start: 2020-06-03 | End: 2020-06-03

## 2020-06-03 RX ORDER — CEFTRIAXONE 500 MG/1
1000 INJECTION, POWDER, FOR SOLUTION INTRAMUSCULAR; INTRAVENOUS ONCE
Refills: 0 | Status: COMPLETED | OUTPATIENT
Start: 2020-06-03 | End: 2020-06-03

## 2020-06-03 RX ORDER — CEFTRIAXONE 500 MG/1
INJECTION, POWDER, FOR SOLUTION INTRAMUSCULAR; INTRAVENOUS
Refills: 0 | Status: DISCONTINUED | OUTPATIENT
Start: 2020-06-03 | End: 2020-06-06

## 2020-06-03 RX ORDER — LACOSAMIDE 50 MG/1
200 TABLET ORAL
Refills: 0 | Status: DISCONTINUED | OUTPATIENT
Start: 2020-06-03 | End: 2020-06-03

## 2020-06-03 RX ADMIN — Medication 100 GRAM(S): at 23:43

## 2020-06-03 RX ADMIN — HUMAN INSULIN 40 UNIT(S): 100 INJECTION, SUSPENSION SUBCUTANEOUS at 00:43

## 2020-06-03 RX ADMIN — Medication 5 MILLIGRAM(S): at 21:08

## 2020-06-03 RX ADMIN — FENTANYL CITRATE 50 MICROGRAM(S): 50 INJECTION INTRAVENOUS at 03:26

## 2020-06-03 RX ADMIN — CHLORHEXIDINE GLUCONATE 15 MILLILITER(S): 213 SOLUTION TOPICAL at 05:19

## 2020-06-03 RX ADMIN — HUMAN INSULIN 40 UNIT(S): 100 INJECTION, SUSPENSION SUBCUTANEOUS at 05:20

## 2020-06-03 RX ADMIN — HUMAN INSULIN 42 UNIT(S): 100 INJECTION, SUSPENSION SUBCUTANEOUS at 17:25

## 2020-06-03 RX ADMIN — LACOSAMIDE 100 MILLIGRAM(S): 50 TABLET ORAL at 06:12

## 2020-06-03 RX ADMIN — SIMVASTATIN 20 MILLIGRAM(S): 20 TABLET, FILM COATED ORAL at 21:08

## 2020-06-03 RX ADMIN — LEVETIRACETAM 1000 MILLIGRAM(S): 250 TABLET, FILM COATED ORAL at 05:17

## 2020-06-03 RX ADMIN — PANTOPRAZOLE SODIUM 40 MILLIGRAM(S): 20 TABLET, DELAYED RELEASE ORAL at 12:56

## 2020-06-03 RX ADMIN — LACOSAMIDE 100 MILLIGRAM(S): 50 TABLET ORAL at 05:51

## 2020-06-03 RX ADMIN — FENTANYL CITRATE 25 MICROGRAM(S): 50 INJECTION INTRAVENOUS at 02:07

## 2020-06-03 RX ADMIN — Medication 650 MILLIGRAM(S): at 05:22

## 2020-06-03 RX ADMIN — ENOXAPARIN SODIUM 40 MILLIGRAM(S): 100 INJECTION SUBCUTANEOUS at 17:25

## 2020-06-03 RX ADMIN — HUMAN INSULIN 20 UNIT(S): 100 INJECTION, SUSPENSION SUBCUTANEOUS at 23:40

## 2020-06-03 RX ADMIN — Medication 12.5 MILLIGRAM(S): at 05:17

## 2020-06-03 RX ADMIN — Medication 2: at 17:25

## 2020-06-03 RX ADMIN — Medication 2 MILLIGRAM(S): at 04:30

## 2020-06-03 RX ADMIN — Medication 40 MILLIEQUIVALENT(S): at 23:43

## 2020-06-03 RX ADMIN — Medication 4: at 05:20

## 2020-06-03 RX ADMIN — CEFTRIAXONE 100 MILLIGRAM(S): 500 INJECTION, POWDER, FOR SOLUTION INTRAMUSCULAR; INTRAVENOUS at 21:38

## 2020-06-03 RX ADMIN — Medication 150 MICROGRAM(S): at 21:08

## 2020-06-03 RX ADMIN — LACOSAMIDE 200 MILLIGRAM(S): 50 TABLET ORAL at 17:25

## 2020-06-03 RX ADMIN — CHLORHEXIDINE GLUCONATE 1 APPLICATION(S): 213 SOLUTION TOPICAL at 21:08

## 2020-06-03 RX ADMIN — HUMAN INSULIN 40 UNIT(S): 100 INJECTION, SUSPENSION SUBCUTANEOUS at 12:57

## 2020-06-03 RX ADMIN — Medication 4: at 12:56

## 2020-06-03 RX ADMIN — Medication 12.5 MILLIGRAM(S): at 17:27

## 2020-06-03 RX ADMIN — LEVETIRACETAM 1000 MILLIGRAM(S): 250 TABLET, FILM COATED ORAL at 17:25

## 2020-06-03 RX ADMIN — Medication 15 MILLIGRAM(S): at 05:17

## 2020-06-03 RX ADMIN — AMLODIPINE BESYLATE 10 MILLIGRAM(S): 2.5 TABLET ORAL at 05:17

## 2020-06-03 RX ADMIN — POLYETHYLENE GLYCOL 3350 17 GRAM(S): 17 POWDER, FOR SOLUTION ORAL at 05:17

## 2020-06-03 RX ADMIN — CHLORHEXIDINE GLUCONATE 15 MILLILITER(S): 213 SOLUTION TOPICAL at 17:25

## 2020-06-03 NOTE — CHART NOTE - NSCHARTNOTEFT_GEN_A_CORE
Nutrition Follow Up Note     Patient seen for: nutrition follow up on ICU    Source: patient, medical record, communication with team.     Pt is a 63 yo F with PMH: hypothyroidism, HTN, IDDM, COPD/CHRIS on home O2, RA, SBO s/p ex lap with lysis of adhesions c/b evisceration. 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 Pt admitted with possible seizure activity at home; found to have L IPH with IVH and SAH extension. Intubated at OSH and transferred to [facility]. Pt placed on vEEG; no surgical intervention noted at this time. Pt now on NPH (off insulin gtt). followed by endocrinology. Palliative care following to determine goals of care; possible trach/PEG (if within family/pt wishes).     Diet Order: Diet, NPO with Tube Feed:   Tube Feeding Modality: Orogastric  Glucerna 1.2 [place] (GLUCERNARTH)  Total Volume for 24 Hours (mL): 1560  Continuous  Starting Tube Feed Rate {mL per Hour}: 20  Increase Tube Feed Rate by (mL): 10     Every 4 hours  Until Goal Tube Feed Rate (mL per Hour): 65  Tube Feed Duration (in Hours): 24  Tube Feed Start Time: 23:00 (06-02-20 @ 11:06)    CURRENT EN ORDER PROVIDES: (based on upper  lbs/60kg): 1560ml, 1872kcal (31kcal/kg) and 94g protein (1.6g protein/kg)    EN provision (per nursing flow sheet):   (6/3): 720ml (thus far; infusing at 60ml/hr)   (6/2): 1440ml (92% of goal) - EN goal rate was increased from 60ml/hr x 24 hrs to 65ml/hr x 24 hrs.   (6/1): 1440ml (100% of goal) - EN goal rate was 60ml/hr x 24 hrs.     Last BM (6/3): x 2. Bowel regimen: Miralax, senna    Anthropometric Measurements:   Height (cm): 162.6 (05-30-20 @ 00:49), 152.4 (05-29-20 @ 19:18), 154.9 (05-12-20 @ 21:48)  Weight (kg): 99.6 (05-30-20 @ 00:49), 136.1 (05-29-20 @ 19:18), 98.2 (05-12-20 @ 23:57)  BMI (kg/m2): 37.7 (05-30-20 @ 00:49), 51.5 (05-30-20 @ 00:49), 58.6 (05-29-20 @ 19:18), 40.9 (05-12-20 @ 23:57)    Medications: MEDICATIONS  (STANDING):  amLODIPine   Tablet 10 milliGRAM(s) Oral daily  chlorhexidine 0.12% Liquid 15 milliLiter(s) Oral Mucosa every 12 hours  chlorhexidine 4% Liquid 1 Application(s) Topical <User Schedule>  dextrose 5%. 1000 milliLiter(s) (50 mL/Hr) IV Continuous <Continuous>  dextrose 50% Injectable 12.5 Gram(s) IV Push once  dextrose 50% Injectable 25 Gram(s) IV Push once  dextrose 50% Injectable 25 Gram(s) IV Push once  enoxaparin Injectable 40 milliGRAM(s) SubCutaneous <User Schedule>  insulin lispro (HumaLOG) corrective regimen sliding scale   SubCutaneous every 6 hours  insulin NPH human recombinant 40 Unit(s) SubCutaneous every 6 hours  lacosamide 200 milliGRAM(s) Oral two times a day  levETIRAcetam  Solution 1000 milliGRAM(s) Oral two times a day  levothyroxine Injectable 150 MICROGram(s) IV Push at bedtime  metoprolol tartrate 12.5 milliGRAM(s) Oral two times a day  pantoprazole  Injectable 40 milliGRAM(s) IV Push daily  polyethylene glycol 3350 17 Gram(s) Oral every 12 hours  predniSONE   Tablet 5 milliGRAM(s) Oral at bedtime  predniSONE   Tablet 15 milliGRAM(s) Oral <User Schedule>  senna 2 Tablet(s) Oral at bedtime  simvastatin 20 milliGRAM(s) Oral at bedtime    MEDICATIONS  (PRN):  acetaminophen    Suspension .. 650 milliGRAM(s) Oral every 6 hours PRN Temp greater or equal to 38C (100.4F), Mild Pain (1 - 3)  dextrose 40% Gel 15 Gram(s) Oral once PRN Blood Glucose LESS THAN 70 milliGRAM(s)/deciliter  fentaNYL    Injectable 25 MICROGram(s) IV Push every 2 hours PRN Severe Pain (7 - 10)  glucagon  Injectable 1 milliGRAM(s) IntraMuscular once PRN Glucose LESS THAN 70 milligrams/deciliter  sodium chloride 0.9% lock flush 10 milliLiter(s) IV Push every 1 hour PRN Pre/post blood products, medications, blood draw, and to maintain line patency    Labs:     Triglycerides, Serum: 326 mg/dL (05-30-20 @ 04:52)    POCT Blood Glucose.: 207 mg/dL (06-03-20 @ 05:15)  POCT Blood Glucose.: 173 mg/dL (06-03-20 @ 00:30)  POCT Blood Glucose.: 139 mg/dL (06-02-20 @ 21:35)  POCT Blood Glucose.: 179 mg/dL (06-02-20 @ 17:29)  POCT Blood Glucose.: 264 mg/dL (06-02-20 @ 14:04)    Skin per nursing documentation: surgical site lower abdomen  Edema: 2+ generalized     Estimated Needs: (based on upper  lbs / 59.8kg)  Energy: (30-35kcal/kg): 1794-2093kcal  Protein: (1.4-1.6g protein/kg): 94-96g protein     Previous Nutrition Diagnosis: Functional (swallowing difficulty); Increased Nutrient Needs  Nutrition Diagnosis is: Ongoing, with provision of enteral nutrition.     New Nutrition Diagnosis:      Recommended Interventions:   1. Continue Glucerna 1.2 at goal rate 65ml/hr x 24 hrs. Will provide: 1560ml, 1872kcal (31kcal/kg) and 94g protein (1.6g protein/kg)  2. Monitor GI tolerance. RD to remain available to adjust EN formulary, volume/rate PRN.   3. Trend BG levels, renal indices, LFT's, electrolytes and triglycerides   4. Monitor wt trends, nutrition related labs, skin integrity, hydration status and bowel regularity.   5. Determine nutritional goals of care.     Monitoring and Evaluation:   Continue to monitor nutrition provision and tolerance, weights, labs, skin integrity.   RD remains available upon request and will follow up per protocol.    Alison Kleiner RD, [facility] ([phone])

## 2020-06-03 NOTE — PROGRESS NOTE ADULT - SUBJECTIVE AND OBJECTIVE BOX
Chief Complaint: management of DM2 in patient on tube feeds     History: Patient seen and examined at bedside. Remains critically ill, intubated on tube feeds.     MEDICATIONS  (STANDING):  amLODIPine   Tablet 10 milliGRAM(s) Oral daily  chlorhexidine 0.12% Liquid 15 milliLiter(s) Oral Mucosa every 12 hours  chlorhexidine 4% Liquid 1 Application(s) Topical <User Schedule>  dextrose 5%. 1000 milliLiter(s) (50 mL/Hr) IV Continuous <Continuous>  dextrose 50% Injectable 12.5 Gram(s) IV Push once  dextrose 50% Injectable 25 Gram(s) IV Push once  dextrose 50% Injectable 25 Gram(s) IV Push once  enoxaparin Injectable 40 milliGRAM(s) SubCutaneous <User Schedule>  insulin lispro (HumaLOG) corrective regimen sliding scale   SubCutaneous every 6 hours  insulin NPH human recombinant 42 Unit(s) SubCutaneous every 6 hours  lacosamide 200 milliGRAM(s) Oral two times a day  levETIRAcetam  Solution 1000 milliGRAM(s) Oral two times a day  levothyroxine Injectable 150 MICROGram(s) IV Push at bedtime  metoprolol tartrate 12.5 milliGRAM(s) Oral two times a day  pantoprazole  Injectable 40 milliGRAM(s) IV Push daily  polyethylene glycol 3350 17 Gram(s) Oral every 12 hours  predniSONE   Tablet 5 milliGRAM(s) Oral at bedtime  predniSONE   Tablet 15 milliGRAM(s) Oral <User Schedule>  senna 2 Tablet(s) Oral at bedtime  simvastatin 20 milliGRAM(s) Oral at bedtime    MEDICATIONS  (PRN):  acetaminophen    Suspension .. 650 milliGRAM(s) Oral every 6 hours PRN Temp greater or equal to 38C (100.4F), Mild Pain (1 - 3)  dextrose 40% Gel 15 Gram(s) Oral once PRN Blood Glucose LESS THAN 70 milliGRAM(s)/deciliter  fentaNYL    Injectable 25 MICROGram(s) IV Push every 2 hours PRN Severe Pain (7 - 10)  glucagon  Injectable 1 milliGRAM(s) IntraMuscular once PRN Glucose LESS THAN 70 milligrams/deciliter  sodium chloride 0.9% lock flush 10 milliLiter(s) IV Push every 1 hour PRN Pre/post blood products, medications, blood draw, and to maintain line patency    PHYSICAL EXAM:  VITALS: T(C): 38 (06-03-20 @ 15:00)  T(F): 100.4 (06-03-20 @ 15:00), Max: 100.9 (06-03-20 @ 11:00)  HR: 114 (06-03-20 @ 16:01) (80 - 142)  BP: --  RR:  (14 - 38)  SpO2:  (97% - 100%)  Wt(kg): 99KG  GENERAL: sedated, well-groomed, well-developed  RESPIRATORY: Clear to auscultation bilaterally; intubated   CARDIOVASCULAR: Regular rate and rhythm; No murmurs; + peripheral edema  GI: Soft, nontender, non distended, + OGT  PSYCH: unarousable to verbal stimuli     POCT Blood Glucose.: 228 mg/dL (06-03-20 @ 12:39) NPH 40 +4   POCT Blood Glucose.: 207 mg/dL (06-03-20 @ 05:15) NPH 40 +4   POCT Blood Glucose.: 173 mg/dL (06-03-20 @ 00:30) NPH 40   POCT Blood Glucose.: 139 mg/dL (06-02-20 @ 21:35) NPH 40   POCT Blood Glucose.: 179 mg/dL (06-02-20 @ 17:29) NPH 35  POCT Blood Glucose.: 264 mg/dL (06-02-20 @ 14:04) NPH 35  POCT Blood Glucose.: 259 mg/dL (06-02-20 @ 10:41)  POCT Blood Glucose.: 309 mg/dL (06-02-20 @ 05:58)  POCT Blood Glucose.: 186 mg/dL (06-02-20 @ 00:52)  POCT Blood Glucose.: 257 mg/dL (06-01-20 @ 21:46)  POCT Blood Glucose.: 297 mg/dL (06-01-20 @ 17:17)  POCT Blood Glucose.: 137 mg/dL (06-01-20 @ 13:07)  POCT Blood Glucose.: 138 mg/dL (06-01-20 @ 12:01)  POCT Blood Glucose.: 150 mg/dL (06-01-20 @ 11:02)  POCT Blood Glucose.: 166 mg/dL (06-01-20 @ 09:57)  POCT Blood Glucose.: 151 mg/dL (06-01-20 @ 09:00)  POCT Blood Glucose.: 168 mg/dL (06-01-20 @ 08:02)  POCT Blood Glucose.: 179 mg/dL (06-01-20 @ 06:55)  POCT Blood Glucose.: 200 mg/dL (06-01-20 @ 06:14)  POCT Blood Glucose.: 201 mg/dL (06-01-20 @ 05:08)  POCT Blood Glucose.: 182 mg/dL (06-01-20 @ 03:59)  POCT Blood Glucose.: 141 mg/dL (06-01-20 @ 02:58)  POCT Blood Glucose.: 128 mg/dL (06-01-20 @ 02:04)  POCT Blood Glucose.: 154 mg/dL (06-01-20 @ 00:58)  POCT Blood Glucose.: 142 mg/dL (06-01-20 @ 00:04)  POCT Blood Glucose.: 137 mg/dL (05-31-20 @ 23:06)  POCT Blood Glucose.: 141 mg/dL (05-31-20 @ 22:02)  POCT Blood Glucose.: 145 mg/dL (05-31-20 @ 21:18)  POCT Blood Glucose.: 145 mg/dL (05-31-20 @ 20:06)  POCT Blood Glucose.: 149 mg/dL (05-31-20 @ 19:06)  POCT Blood Glucose.: 164 mg/dL (05-31-20 @ 18:12)  POCT Blood Glucose.: 180 mg/dL (05-31-20 @ 17:08)      06-02    143  |  106  |  15  ----------------------------<  188<H>  4.3   |  22  |  0.62    EGFR if : 112  EGFR if non : 97    Ca    10.1      06-02  Mg     1.9     06-02  Phos  4.3     06-02    Thyroid Function Tests:   05-30 @ 04:44 TSH 12.30 FreeT4 -- T3 51   05-29 @ 20:22 TSH 11.400

## 2020-06-03 NOTE — CONSULT NOTE ADULT - PROBLEM SELECTOR RECOMMENDATION 4
At baseline patient was dependent of ADL's , able to feed self but non ambulatory.  KPS 20% requires assistance with all basic needs

## 2020-06-03 NOTE — PROGRESS NOTE ADULT - SUBJECTIVE AND OBJECTIVE BOX
64 year old female with Pmhx significant for Hypothyroidism, HTN, IDDM, COPD/CHRIS, RA 9on Prednisone), SBO s/p ex lap with lysis of adhesions c/b evisceration,  recent hospitalization in 3/2020 with encephalopathy, significantly hypothyroid (, without myxedema coma), small (stable) right sided SDH, UTI, catatonic reaction to VPA (now resolved) and DKA and psychiatric issues, 2020 hospitalization for GIB, Emphysematous cystitis, endometritis, Sigmoid colitis, morbid obesity, functional quadriplegia.  Presented on  with altered mental status.  Patient was confused, didn't recognize son. At 11 am she was gasping for air, ?seizure	    HCP: Norris Rush, Son [phone]; Daughter, Heavenly Schmidt [phone]; son asks that only him or his sister, Heavenly be contacted for patient updates/information as they are both proxy's for patient (29 May 2020 23:41)    OVERNIGHT EVENTS: +generalized seizure, Vimpat added, on EEG    ALLERGIES: Depakote (Other)  Seroquel (Other (Severe))    VITALS:  ICU Vital Signs Last 24 Hrs  T(C): 38.1 (2020 06:00), Max: 38.1 (2020 06:00)  T(F): 100.6 (2020 06:00), Max: 100.6 (2020 06:00)  HR: 101 (2020 06:00) (78 - 142)  BP: --  BP(mean): --  ABP: 132/76 (2020 06:00) (132/76 - 188/110)  ABP(mean): 98 (2020 06:00) (91 - 138)  RR: 16 (2020 06:00) (16 - 38)  SpO2: 97% (2020 06:00) (97% - 100%)        DEVICES:   [] Restraints [X] ET tube [X] central line [X] arterial line [] iraheta [] NGT/OGT [] EVD [] [] LD [] MABEL/HMV [] Trach [] PEG [] Chest Tube [] other:      LABS:                                    8.0    14.20 )-----------( 375      ( 2020 00:27 )             27.4     06-02    143  |  106  |  15  ----------------------------<  188<H>  4.3   |  22  |  0.62    Ca    10.1      2020 00:27  Phos  4.3     06-02  Mg     1.9     06-02      ABG - ( 2020 00:23 )  pH, Arterial: 7.46  pH, Blood: x     /  pCO2: 36    /  pO2: 130   / HCO3: 25    / Base Excess: 1.9   /  SaO2: 100         COVID-19 PCR: NotDetec (30 May 2020 20:37)      Culture - Urine (20 @ 18:13)    Specimen Source: .Urine Catheterized    Culture Results:   <10,000 CFU/mL Normal Urogenital Perla    Culture - Blood (20 @ 05:38)    Specimen Source: .Blood Blood-Peripheral    Culture Results:   No growth to date.    Culture - Blood (20 @ 05:38)    Specimen Source: .Blood Blood-Peripheral    Culture Results:   No growth to date.    CAPILLARY BLOOD GLUCOSE  POCT Blood Glucose.: 207 mg/dL (2020 05:15)  POCT Blood Glucose.: 173 mg/dL (2020 00:30)  POCT Blood Glucose.: 139 mg/dL (2020 21:35)  POCT Blood Glucose.: 179 mg/dL (2020 17:29)  POCT Blood Glucose.: 264 mg/dL (2020 14:04)  POCT Blood Glucose.: 259 mg/dL (2020 10:41)        VENT:  Mode: AC/ CMV (Assist Control/ Continuous Mandatory Ventilation)  RR (machine): 14  TV (machine): 450  FiO2: 30  PEEP: 5  ITime: 0.7  MAP: 11  PIP: 22        EXAMINATION:  General: No acute distress, Intubated  HEENT: Anicteric sclerae  Cardiac: [id number]  Lungs: Clear  Abdomen: Soft, non-tender, +BS  Extremities: No c/c/e  Skin/Incision Site: Clean, dry and intact  Neurologic: Eyes closed, left gaze preference NARCISO, opening to noxious, pupils reactive, +cough/gag, breathe above set rate of 14 , moves spontaneously the left UE,  trace movement of the  RUE, TF b/l LEs      MEDICATIONS:   MEDICATIONS  (STANDING):  amLODIPine   Tablet 10 milliGRAM(s) Oral daily  chlorhexidine 0.12% Liquid 15 milliLiter(s) Oral Mucosa every 12 hours  chlorhexidine 4% Liquid 1 Application(s) Topical <User Schedule>  dextrose 5%. 1000 milliLiter(s) (50 mL/Hr) IV Continuous <Continuous>  dextrose 50% Injectable 12.5 Gram(s) IV Push once  dextrose 50% Injectable 25 Gram(s) IV Push once  dextrose 50% Injectable 25 Gram(s) IV Push once  enoxaparin Injectable 40 milliGRAM(s) SubCutaneous <User Schedule>  insulin lispro (HumaLOG) corrective regimen sliding scale   SubCutaneous every 6 hours  insulin NPH human recombinant 40 Unit(s) SubCutaneous every 6 hours  lacosamide 200 milliGRAM(s) Oral two times a day  levETIRAcetam  Solution 1000 milliGRAM(s) Oral two times a day  levothyroxine Injectable 140 MICROGram(s) IV Push at bedtime  metoprolol tartrate 12.5 milliGRAM(s) Oral two times a day  pantoprazole  Injectable 40 milliGRAM(s) IV Push daily  polyethylene glycol 3350 17 Gram(s) Oral every 12 hours  predniSONE   Tablet 5 milliGRAM(s) Oral at bedtime  predniSONE   Tablet 15 milliGRAM(s) Oral <User Schedule>  senna 2 Tablet(s) Oral at bedtime  simvastatin 20 milliGRAM(s) Oral at bedtime    MEDICATIONS  (PRN):  acetaminophen    Suspension .. 650 milliGRAM(s) Oral every 6 hours PRN Temp greater or equal to 38C (100.4F), Mild Pain (1 - 3)  dextrose 40% Gel 15 Gram(s) Oral once PRN Blood Glucose LESS THAN 70 milliGRAM(s)/deciliter  fentaNYL    Injectable 25 MICROGram(s) IV Push every 2 hours PRN Severe Pain (7 - 10)  glucagon  Injectable 1 milliGRAM(s) IntraMuscular once PRN Glucose LESS THAN 70 milligrams/deciliter  sodium chloride 0.9% lock flush 10 milliLiter(s) IV Push every 1 hour PRN Pre/post blood products, medications, blood draw, and to maintain line patency        RADIOLOGY:   CT Head No Cont (20 @ 09:02)   VENTRICLES AND SULCI:  Unchanged mass effect on the left frontal horn of the lateral ventricle related to the left frontal hematoma as seen on the prior without significant interval change compared with the priorstudy. Small amount of layering hemorrhage in the occipital horns of the lateral ventricle  INTRA-AXIAL: No change in appearance of large left frontal hematoma with mass effect on the left frontal horn. Some associated subarachnoid hemorrhage is seen  EXTRA-AXIAL:  Subtle subarachnoid hemorrhage in the left sylvian region and in the interhemispheric fissure as seen on the prior as well.  VISUALIZED SINUSES:  Clear.  VISUALIZED MASTOIDS:  Clear.  CALVARIUM:  Normal.  MISCELLANEOUS: Patient is intubated with an NG tube  IMPRESSION:  No change from 2020      Xray Chest 1 View- PORTABLE-Routine (20 @ 06:31)   The heart is normal in size. The lungs are clear. Endotracheal tube is above the corwin. A central line is seen on the right and the tip is in the superior vena cava. No pneumothorax. No pleural effusions.      CT Head No Cont (20 @ 08:23)   Evidence of left frontal parenchymal hematoma with mass effect on the left lateral ventricle and intraventricular extension with blood layering in the occipital horns. No hydrocephalus. No change in the hematoma.      Xray Chest 1 View- PORTABLE-Urgent (20 @ 08:19)   The heart is normal in size. The lungs appear to be clear. Endotracheal tube is in good position. A central line seen on the right and the tip is in superior vena cava. No pneumothorax.      VA Duplex Lower Ext Vein Scan, Bilat (20 @ 17:40)  No evidence of deep venous thrombosis in either lower extremity.      CT Angio Head w/ IV Cont (20 @ 11:15)   CT venogram demonstrates no evidence of venous sinus thrombosis.      EE/2  Abnormal EEG in an altered patient.  Data was only available up to 1:26pm.  - Frequent brief runs of 0.5-1Hz generalized periodic discharges, maximal in the left and right frontotemporal regions, with shifting laterality, more prominent over the right.   - Moderate to severe generalized slowing.    _____________________________________________________________  EEG IMPRESSION/CLINICAL CORRELATE    Abnormal EEG study.  Diffuse cortical hyperexcitability, maximal in the bilateral fronto-temporal, right>left regions.   Moderate to severe nonspecific diffuse or multifocal cerebral dysfunction.   No seizure seen.      : Abnormal EEG in an altered patient.  - Frequent brief runs of 0.5-1Hz generalized periodic discharges, maximal in the left and right frontotemporal regions, with shifting laterality.   - Moderate to severe generalized slowing.    _____________________________________________________________  EEG IMPRESSION/CLINICAL CORRELATE    Abnormal EEG study.  Diffuse cortical hyperexcitability, maximal in the bilateral temporal regions.   Moderate to severe nonspecific diffuse or multifocal cerebral dysfunction.   No seizure seen.          TTE:   Transthoracic Echocardiogram (. @ 11:31)   Mitral Valve: Normal mitral valve.  Aortic Valve/Aorta: Normal aortic valve.  Normal aortic root size.  Left Atrium: Normal left atrium.  Left Ventricle: Normal left ventricular internal dimensions  and wall thicknesses.  Hyperdynamic left ventricle. Intracavitary gradient  present.  Normal diastolic function.  Right Heart: Normal right atrium. Normal right ventricular  size and systolic function. Normal tricuspid valve. Mild  tricuspid regurgitation. Normal pulmonic valve. Minimal  pulmonic regurgitation.  Pericardium/Pleura: Normal pericardium with no pericardial  effusion.  Hemodynamic: No evidence ofpulmonary hypertension.  Agitated saline injection and color flow Doppler  demonstrate no evidence of a patent foramen ovale. 64 year old female with Pmhx significant for Hypothyroidism, HTN, IDDM, COPD/CHRIS, RA 9on Prednisone), SBO s/p ex lap with lysis of adhesions c/b evisceration,  recent hospitalization in 3/2020 with encephalopathy, significantly hypothyroid (, without myxedema coma), small (stable) right sided SDH, UTI, catatonic reaction to VPA (now resolved) and DKA and psychiatric issues, 2020 hospitalization for GIB, Emphysematous cystitis, endometritis, Sigmoid colitis, morbid obesity, functional quadriplegia.  Presented on  with altered mental status.  Patient was confused, didn't recognize son. At 11 am she was gasping for air, ?seizure	    HCP: Norris Rush, Son [phone]; Daughter, Heavenly Schmidt [phone]; son asks that only him or his sister, Heavenly be contacted for patient updates/information as they are both proxy's for patient (29 May 2020 23:41)    OVERNIGHT EVENTS: +generalized seizure, Vimpat dose increased, on EEG    ALLERGIES: Depakote (Other)  Seroquel (Other (Severe))    VITALS:  ICU Vital Signs Last 24 Hrs  T(C): 38.1 (2020 06:00), Max: 38.1 (2020 06:00)  T(F): 100.6 (2020 06:00), Max: 100.6 (2020 06:00)  HR: 101 (2020 06:00) (78 - 142)  BP: --  BP(mean): --  ABP: 132/76 (2020 06:00) (132/76 - 188/110)  ABP(mean): 98 (2020 06:00) (91 - 138)  RR: 16 (2020 06:00) (16 - 38)  SpO2: 97% (2020 06:00) (97% - 100%)        DEVICES:   [] Restraints [X] ET tube [X] central line [X] arterial line [] iraheta [] NGT/OGT [] EVD [] [] LD [] MABEL/HMV [] Trach [] PEG [] Chest Tube [] other:      LABS:                                    8.0    14.20 )-----------( 375      ( 2020 00:27 )             27.4     06-02    143  |  106  |  15  ----------------------------<  188<H>  4.3   |  22  |  0.62    Ca    10.1      2020 00:27  Phos  4.3     06-02  Mg     1.9     06-02      ABG - ( 2020 00:23 )  pH, Arterial: 7.46  pH, Blood: x     /  pCO2: 36    /  pO2: 130   / HCO3: 25    / Base Excess: 1.9   /  SaO2: 100         COVID-19 PCR: NotDetec (30 May 2020 20:37)      Culture - Urine (20 @ 18:13)    Specimen Source: .Urine Catheterized    Culture Results:   <10,000 CFU/mL Normal Urogenital Perla    Culture - Blood (20 @ 05:38)    Specimen Source: .Blood Blood-Peripheral    Culture Results:   No growth to date.    Culture - Blood (20 @ 05:38)    Specimen Source: .Blood Blood-Peripheral    Culture Results:   No growth to date.    CAPILLARY BLOOD GLUCOSE  POCT Blood Glucose.: 207 mg/dL (2020 05:15)  POCT Blood Glucose.: 173 mg/dL (2020 00:30)  POCT Blood Glucose.: 139 mg/dL (2020 21:35)  POCT Blood Glucose.: 179 mg/dL (2020 17:29)  POCT Blood Glucose.: 264 mg/dL (2020 14:04)  POCT Blood Glucose.: 259 mg/dL (2020 10:41)        VENT:  Mode: AC/ CMV (Assist Control/ Continuous Mandatory Ventilation)  RR (machine): 14  TV (machine): 450  FiO2: 30  PEEP: 5  ITime: 0.7  MAP: 11  PIP: 22        EXAMINATION:  General: No acute distress, Intubated  HEENT: Anicteric sclerae  Cardiac: [id number]  Lungs: Clear  Abdomen: Soft, non-tender, +BS  Extremities: No c/c/e  Skin/Incision Site: Clean, dry and intact  Neurologic: Eyes closed, left gaze preference NARCISO, opening to noxious, pupils reactive, +cough/gag, breathe above set rate of 14 , moves spontaneously the left UE,  trace movement of the  RUE, TF b/l LEs      MEDICATIONS:   MEDICATIONS  (STANDING):  amLODIPine   Tablet 10 milliGRAM(s) Oral daily  chlorhexidine 0.12% Liquid 15 milliLiter(s) Oral Mucosa every 12 hours  chlorhexidine 4% Liquid 1 Application(s) Topical <User Schedule>  dextrose 5%. 1000 milliLiter(s) (50 mL/Hr) IV Continuous <Continuous>  dextrose 50% Injectable 12.5 Gram(s) IV Push once  dextrose 50% Injectable 25 Gram(s) IV Push once  dextrose 50% Injectable 25 Gram(s) IV Push once  enoxaparin Injectable 40 milliGRAM(s) SubCutaneous <User Schedule>  insulin lispro (HumaLOG) corrective regimen sliding scale   SubCutaneous every 6 hours  insulin NPH human recombinant 40 Unit(s) SubCutaneous every 6 hours  lacosamide 200 milliGRAM(s) Oral two times a day  levETIRAcetam  Solution 1000 milliGRAM(s) Oral two times a day  levothyroxine Injectable 140 MICROGram(s) IV Push at bedtime  metoprolol tartrate 12.5 milliGRAM(s) Oral two times a day  pantoprazole  Injectable 40 milliGRAM(s) IV Push daily  polyethylene glycol 3350 17 Gram(s) Oral every 12 hours  predniSONE   Tablet 5 milliGRAM(s) Oral at bedtime  predniSONE   Tablet 15 milliGRAM(s) Oral <User Schedule>  senna 2 Tablet(s) Oral at bedtime  simvastatin 20 milliGRAM(s) Oral at bedtime    MEDICATIONS  (PRN):  acetaminophen    Suspension .. 650 milliGRAM(s) Oral every 6 hours PRN Temp greater or equal to 38C (100.4F), Mild Pain (1 - 3)  dextrose 40% Gel 15 Gram(s) Oral once PRN Blood Glucose LESS THAN 70 milliGRAM(s)/deciliter  fentaNYL    Injectable 25 MICROGram(s) IV Push every 2 hours PRN Severe Pain (7 - 10)  glucagon  Injectable 1 milliGRAM(s) IntraMuscular once PRN Glucose LESS THAN 70 milligrams/deciliter  sodium chloride 0.9% lock flush 10 milliLiter(s) IV Push every 1 hour PRN Pre/post blood products, medications, blood draw, and to maintain line patency        RADIOLOGY:   CT Head No Cont (20 @ 09:02)   VENTRICLES AND SULCI:  Unchanged mass effect on the left frontal horn of the lateral ventricle related to the left frontal hematoma as seen on the prior without significant interval change compared with the priorstudy. Small amount of layering hemorrhage in the occipital horns of the lateral ventricle  INTRA-AXIAL: No change in appearance of large left frontal hematoma with mass effect on the left frontal horn. Some associated subarachnoid hemorrhage is seen  EXTRA-AXIAL:  Subtle subarachnoid hemorrhage in the left sylvian region and in the interhemispheric fissure as seen on the prior as well.  VISUALIZED SINUSES:  Clear.  VISUALIZED MASTOIDS:  Clear.  CALVARIUM:  Normal.  MISCELLANEOUS: Patient is intubated with an NG tube  IMPRESSION:  No change from 2020      Xray Chest 1 View- PORTABLE-Routine (20 @ 06:31)   The heart is normal in size. The lungs are clear. Endotracheal tube is above the corwin. A central line is seen on the right and the tip is in the superior vena cava. No pneumothorax. No pleural effusions.      CT Head No Cont (20 @ 08:23)   Evidence of left frontal parenchymal hematoma with mass effect on the left lateral ventricle and intraventricular extension with blood layering in the occipital horns. No hydrocephalus. No change in the hematoma.      Xray Chest 1 View- PORTABLE-Urgent (20 @ 08:19)   The heart is normal in size. The lungs appear to be clear. Endotracheal tube is in good position. A central line seen on the right and the tip is in superior vena cava. No pneumothorax.      VA Duplex Lower Ext Vein Scan, Bilat (20 @ 17:40)  No evidence of deep venous thrombosis in either lower extremity.      CT Angio Head w/ IV Cont (20 @ 11:15)   CT venogram demonstrates no evidence of venous sinus thrombosis.      EE/2  Abnormal EEG in an altered patient.  Data was only available up to 1:26pm.  - Frequent brief runs of 0.5-1Hz generalized periodic discharges, maximal in the left and right frontotemporal regions, with shifting laterality, more prominent over the right.   - Moderate to severe generalized slowing.    _____________________________________________________________  EEG IMPRESSION/CLINICAL CORRELATE    Abnormal EEG study.  Diffuse cortical hyperexcitability, maximal in the bilateral fronto-temporal, right>left regions.   Moderate to severe nonspecific diffuse or multifocal cerebral dysfunction.   No seizure seen.      : Abnormal EEG in an altered patient.  - Frequent brief runs of 0.5-1Hz generalized periodic discharges, maximal in the left and right frontotemporal regions, with shifting laterality.   - Moderate to severe generalized slowing.    _____________________________________________________________  EEG IMPRESSION/CLINICAL CORRELATE    Abnormal EEG study.  Diffuse cortical hyperexcitability, maximal in the bilateral temporal regions.   Moderate to severe nonspecific diffuse or multifocal cerebral dysfunction.   No seizure seen.          TTE:   Transthoracic Echocardiogram (30. @ 11:31)   Mitral Valve: Normal mitral valve.  Aortic Valve/Aorta: Normal aortic valve.  Normal aortic root size.  Left Atrium: Normal left atrium.  Left Ventricle: Normal left ventricular internal dimensions  and wall thicknesses.  Hyperdynamic left ventricle. Intracavitary gradient  present.  Normal diastolic function.  Right Heart: Normal right atrium. Normal right ventricular  size and systolic function. Normal tricuspid valve. Mild  tricuspid regurgitation. Normal pulmonic valve. Minimal  pulmonic regurgitation.  Pericardium/Pleura: Normal pericardium with no pericardial  effusion.  Hemodynamic: No evidence ofpulmonary hypertension.  Agitated saline injection and color flow Doppler  demonstrate no evidence of a patent foramen ovale. 64 year old female with Pmhx significant for Hypothyroidism, HTN, IDDM, COPD/CHRIS, RA 9on Prednisone), SBO s/p ex lap with lysis of adhesions c/b evisceration,  recent hospitalization in 3/2020 with encephalopathy, significantly hypothyroid (, without myxedema coma), small (stable) right sided SDH, UTI, catatonic reaction to VPA (now resolved) and DKA and psychiatric issues, 2020 hospitalization for GIB, Emphysematous cystitis, endometritis, Sigmoid colitis, morbid obesity, functional quadriplegia.  Presented on  with altered mental status.  Patient was confused, didn't recognize son. At 11 am she was gasping for air, ?seizure	    HCP: Norris Rush, Son [phone]; Daughter, Heavenly Schmidt [phone]; son asks that only him or his sister, Heavenly be contacted for patient updates/information as they are both proxy's for patient (29 May 2020 23:41)    OVERNIGHT EVENTS: +generalized seizure, Vimpat dose increased    ALLERGIES: Depakote (Other)  Seroquel (Other (Severe))    VITALS:  ICU Vital Signs Last 24 Hrs  T(C): 38.1 (2020 06:00), Max: 38.1 (2020 06:00)  T(F): 100.6 (2020 06:00), Max: 100.6 (2020 06:00)  HR: 101 (2020 06:00) (78 - 142)  BP: --  BP(mean): --  ABP: 132/76 (2020 06:00) (132/76 - 188/110)  ABP(mean): 98 (2020 06:00) (91 - 138)  RR: 16 (2020 06:00) (16 - 38)  SpO2: 97% (2020 06:00) (97% - 100%)        DEVICES:   [] Restraints [X] ET tube [X] central line [X] arterial line [] iraheta [] NGT/OGT [] EVD [] [] LD [] MABEL/HMV [] Trach [] PEG [] Chest Tube [] other:      LABS:                                    8.0    14.20 )-----------( 375      ( 2020 00:27 )             27.4     06-02    143  |  106  |  15  ----------------------------<  188<H>  4.3   |  22  |  0.62    Ca    10.1      2020 00:27  Phos  4.3     06-02  Mg     1.9     06-02      ABG - ( 2020 00:23 )  pH, Arterial: 7.46  pH, Blood: x     /  pCO2: 36    /  pO2: 130   / HCO3: 25    / Base Excess: 1.9   /  SaO2: 100         COVID-19 PCR: NotDetec (30 May 2020 20:37)      Culture - Urine (20 @ 18:13)    Specimen Source: .Urine Catheterized    Culture Results:   <10,000 CFU/mL Normal Urogenital Perla    Culture - Blood (20 @ 05:38)    Specimen Source: .Blood Blood-Peripheral    Culture Results:   No growth to date.    Culture - Blood (20 @ 05:38)    Specimen Source: .Blood Blood-Peripheral    Culture Results:   No growth to date.    CAPILLARY BLOOD GLUCOSE  POCT Blood Glucose.: 207 mg/dL (2020 05:15)  POCT Blood Glucose.: 173 mg/dL (2020 00:30)  POCT Blood Glucose.: 139 mg/dL (2020 21:35)  POCT Blood Glucose.: 179 mg/dL (2020 17:29)  POCT Blood Glucose.: 264 mg/dL (2020 14:04)  POCT Blood Glucose.: 259 mg/dL (2020 10:41)        VENT:  Mode: AC/ CMV (Assist Control/ Continuous Mandatory Ventilation)  RR (machine): 14  TV (machine): 450  FiO2: 30  PEEP: 5  ITime: 0.7  MAP: 11  PIP: 22        EXAMINATION:  General: No acute distress, Intubated  HEENT: Anicteric sclerae  Cardiac: [id number]  Lungs: Clear  Abdomen: Soft, non-tender, +BS  Extremities: No c/c/e  Skin/Incision Site: Clean, dry and intact  Neurologic: Eyes closed, left gaze preference NARCISO, opening to noxious, pupils reactive, +cough/gag, breathe above set rate of 14 , moves spontaneously the left UE,  trace movement of the  RUE, TF b/l LEs      MEDICATIONS:   MEDICATIONS  (STANDING):  amLODIPine   Tablet 10 milliGRAM(s) Oral daily  chlorhexidine 0.12% Liquid 15 milliLiter(s) Oral Mucosa every 12 hours  chlorhexidine 4% Liquid 1 Application(s) Topical <User Schedule>  dextrose 5%. 1000 milliLiter(s) (50 mL/Hr) IV Continuous <Continuous>  dextrose 50% Injectable 12.5 Gram(s) IV Push once  dextrose 50% Injectable 25 Gram(s) IV Push once  dextrose 50% Injectable 25 Gram(s) IV Push once  enoxaparin Injectable 40 milliGRAM(s) SubCutaneous <User Schedule>  insulin lispro (HumaLOG) corrective regimen sliding scale   SubCutaneous every 6 hours  insulin NPH human recombinant 40 Unit(s) SubCutaneous every 6 hours  lacosamide 200 milliGRAM(s) Oral two times a day  levETIRAcetam  Solution 1000 milliGRAM(s) Oral two times a day  levothyroxine Injectable 140 MICROGram(s) IV Push at bedtime  metoprolol tartrate 12.5 milliGRAM(s) Oral two times a day  pantoprazole  Injectable 40 milliGRAM(s) IV Push daily  polyethylene glycol 3350 17 Gram(s) Oral every 12 hours  predniSONE   Tablet 5 milliGRAM(s) Oral at bedtime  predniSONE   Tablet 15 milliGRAM(s) Oral <User Schedule>  senna 2 Tablet(s) Oral at bedtime  simvastatin 20 milliGRAM(s) Oral at bedtime    MEDICATIONS  (PRN):  acetaminophen    Suspension .. 650 milliGRAM(s) Oral every 6 hours PRN Temp greater or equal to 38C (100.4F), Mild Pain (1 - 3)  dextrose 40% Gel 15 Gram(s) Oral once PRN Blood Glucose LESS THAN 70 milliGRAM(s)/deciliter  fentaNYL    Injectable 25 MICROGram(s) IV Push every 2 hours PRN Severe Pain (7 - 10)  glucagon  Injectable 1 milliGRAM(s) IntraMuscular once PRN Glucose LESS THAN 70 milligrams/deciliter  sodium chloride 0.9% lock flush 10 milliLiter(s) IV Push every 1 hour PRN Pre/post blood products, medications, blood draw, and to maintain line patency        RADIOLOGY:   CT Head No Cont (20 @ 09:02)   VENTRICLES AND SULCI:  Unchanged mass effect on the left frontal horn of the lateral ventricle related to the left frontal hematoma as seen on the prior without significant interval change compared with the priorstudy. Small amount of layering hemorrhage in the occipital horns of the lateral ventricle  INTRA-AXIAL: No change in appearance of large left frontal hematoma with mass effect on the left frontal horn. Some associated subarachnoid hemorrhage is seen  EXTRA-AXIAL:  Subtle subarachnoid hemorrhage in the left sylvian region and in the interhemispheric fissure as seen on the prior as well.  VISUALIZED SINUSES:  Clear.  VISUALIZED MASTOIDS:  Clear.  CALVARIUM:  Normal.  MISCELLANEOUS: Patient is intubated with an NG tube  IMPRESSION:  No change from 2020      Xray Chest 1 View- PORTABLE-Routine (20 @ 06:31)   The heart is normal in size. The lungs are clear. Endotracheal tube is above the corwin. A central line is seen on the right and the tip is in the superior vena cava. No pneumothorax. No pleural effusions.      CT Head No Cont (20 @ 08:23)   Evidence of left frontal parenchymal hematoma with mass effect on the left lateral ventricle and intraventricular extension with blood layering in the occipital horns. No hydrocephalus. No change in the hematoma.      Xray Chest 1 View- PORTABLE-Urgent (20 @ 08:19)   The heart is normal in size. The lungs appear to be clear. Endotracheal tube is in good position. A central line seen on the right and the tip is in superior vena cava. No pneumothorax.      VA Duplex Lower Ext Vein Scan, Bilat (20 @ 17:40)  No evidence of deep venous thrombosis in either lower extremity.      CT Angio Head w/ IV Cont (20 @ 11:15)   CT venogram demonstrates no evidence of venous sinus thrombosis.      EE/2  Abnormal EEG in an altered patient.  Data was only available up to 1:26pm.  - Frequent brief runs of 0.5-1Hz generalized periodic discharges, maximal in the left and right frontotemporal regions, with shifting laterality, more prominent over the right.   - Moderate to severe generalized slowing.    _____________________________________________________________  EEG IMPRESSION/CLINICAL CORRELATE    Abnormal EEG study.  Diffuse cortical hyperexcitability, maximal in the bilateral fronto-temporal, right>left regions.   Moderate to severe nonspecific diffuse or multifocal cerebral dysfunction.   No seizure seen.      : Abnormal EEG in an altered patient.  - Frequent brief runs of 0.5-1Hz generalized periodic discharges, maximal in the left and right frontotemporal regions, with shifting laterality.   - Moderate to severe generalized slowing.    _____________________________________________________________  EEG IMPRESSION/CLINICAL CORRELATE    Abnormal EEG study.  Diffuse cortical hyperexcitability, maximal in the bilateral temporal regions.   Moderate to severe nonspecific diffuse or multifocal cerebral dysfunction.   No seizure seen.          TTE:   Transthoracic Echocardiogram (. @ 11:31)   Mitral Valve: Normal mitral valve.  Aortic Valve/Aorta: Normal aortic valve.  Normal aortic root size.  Left Atrium: Normal left atrium.  Left Ventricle: Normal left ventricular internal dimensions  and wall thicknesses.  Hyperdynamic left ventricle. Intracavitary gradient  present.  Normal diastolic function.  Right Heart: Normal right atrium. Normal right ventricular  size and systolic function. Normal tricuspid valve. Mild  tricuspid regurgitation. Normal pulmonic valve. Minimal  pulmonic regurgitation.  Pericardium/Pleura: Normal pericardium with no pericardial  effusion.  Hemodynamic: No evidence ofpulmonary hypertension.  Agitated saline injection and color flow Doppler  demonstrate no evidence of a patent foramen ovale.

## 2020-06-03 NOTE — CONSULT NOTE ADULT - SUBJECTIVE AND OBJECTIVE BOX
Amy Armenta, MYLES  [phone]  Mon-Wed  Please page [phone]  Thurs-Sun        HPI:  65 yo F with AMS since this morning.  Pt. was confused, didn't recognize son this morning.  He notes she was sleeping more than usual yesterday, which tipped off son that something was wrong.  At 11 am pt. was gasping for air like she was seizing. 2 hours ago pt. was dry heaving, her chest was going up and down and she started shaking like she was having a seizure.  Pt. has no history of seizures per son.  		PMH: hypothyroidism, HTN, IDDM, COPD/CHRIS on home O2, RA (on Prednisone), SBO s/p ex lap with lysis of adhesions c/b evisceration,  recent hospitalization in 3/2020 with encephalopathy, significantly hypothyroid (, without myxedema coma), small (stable) right sided SDH, UTI, catatonic reaction to VPA (now resolved) and DKA and psychiatric issues, 2020 hospitalization for GIB, Emphysematous cystitis, endometritis, Sigmoid colitis, morbid obesity, functional quadriplegia    HCP: Norris Gamez, Aldo [phone]; Daughter, Heavenly Schmidt [phone]; son asks that only him or his sister, Heavenly be contacted for patient updates/information as they are both proxy's for patient (29 May 2020 23:41)    PERTINENT PM/SXH:   Diverticulosis  Cellulitis  CVA (Cerebral Vascular Accident)  RA (Rheumatoid Arthritis)  Tooth Abscess  Arthritis  Cigarette Smoker  Chronic Asthma  Adult Hypothyroidism  Borderline Diabetes Mellitus  High Cholesterol  H/O: HTN    Wrist Disorder  History of  Section    FAMILY HISTORY:  No pertinent family history in first degree relatives    ITEMS NOT CHECKED ARE NOT PRESENT    SOCIAL HISTORY:   Significant other/partner[ ]  Children[x ]  Samaritan/Spirituality:  Sabianism  Substance hx:  [ ]   Tobacco hx:  [ ]   Alcohol hx: [ ]   Home Opioid hx:  [ ] I-Stop Reference No:  Living Situation: [ X]Home  [ ]Long term care  [ ]Rehab [ ]Other    ADVANCE DIRECTIVES:    DNR  MOLST  [ ]  Living Will  [ ]   DECISION MAKER(s):  [ ] Health Care Proxy(s)  [ X] Surrogate(s)  [ ] Guardian           Name(s): Phone Number(s):  ALDO GAMEZ   [phone]  BASELINE (I)ADL(s) (prior to admission):  [place]: [ ]Total  [ ] Moderate [X ]Dependent    Allergies    Depakote (Other)  Seroquel (Other (Severe))    Intolerances    MEDICATIONS  (STANDING):  amLODIPine   Tablet 10 milliGRAM(s) Oral daily  chlorhexidine 0.12% Liquid 15 milliLiter(s) Oral Mucosa every 12 hours  chlorhexidine 4% Liquid 1 Application(s) Topical <User Schedule>  dextrose 5%. 1000 milliLiter(s) (50 mL/Hr) IV Continuous <Continuous>  dextrose 50% Injectable 12.5 Gram(s) IV Push once  dextrose 50% Injectable 25 Gram(s) IV Push once  dextrose 50% Injectable 25 Gram(s) IV Push once  enoxaparin Injectable 40 milliGRAM(s) SubCutaneous <User Schedule>  insulin lispro (HumaLOG) corrective regimen sliding scale   SubCutaneous every 6 hours  insulin NPH human recombinant 40 Unit(s) SubCutaneous every 6 hours  lacosamide 200 milliGRAM(s) Oral two times a day  levETIRAcetam  Solution 1000 milliGRAM(s) Oral two times a day  levothyroxine Injectable 140 MICROGram(s) IV Push at bedtime  metoprolol tartrate 12.5 milliGRAM(s) Oral two times a day  pantoprazole  Injectable 40 milliGRAM(s) IV Push daily  polyethylene glycol 3350 17 Gram(s) Oral every 12 hours  predniSONE   Tablet 5 milliGRAM(s) Oral at bedtime  predniSONE   Tablet 15 milliGRAM(s) Oral <User Schedule>  senna 2 Tablet(s) Oral at bedtime  simvastatin 20 milliGRAM(s) Oral at bedtime    MEDICATIONS  (PRN):  acetaminophen    Suspension .. 650 milliGRAM(s) Oral every 6 hours PRN Temp greater or equal to 38C (100.4F), Mild Pain (1 - 3)  dextrose 40% Gel 15 Gram(s) Oral once PRN Blood Glucose LESS THAN 70 milliGRAM(s)/deciliter  fentaNYL    Injectable 25 MICROGram(s) IV Push every 2 hours PRN Severe Pain (7 - 10)  glucagon  Injectable 1 milliGRAM(s) IntraMuscular once PRN Glucose LESS THAN 70 milligrams/deciliter  sodium chloride 0.9% lock flush 10 milliLiter(s) IV Push every 1 hour PRN Pre/post blood products, medications, blood draw, and to maintain line patency    PRESENT SYMPTOMS: [X ]Unable to obtain due to poor mentation   Source if other than patient:  [ ]Family   [ ]Team     Pain: [ ]yes [ ]no  QOL impact -   Location -                    Aggravating factors -  Quality -  Radiation -  Timing-  Severity (0-10 scale):  Minimal acceptable level (0-10 scale):     PAIN AD Score: 0    [email or web address] (press ctrl +  left click to view)    Dyspnea:                           [ ]Mild [ ]Moderate [ ]Severe  Anxiety:                             [ ]Mild [ ]Moderate [ ]Severe  Fatigue:                             [ ]Mild [ ]Moderate [ ]Severe  Nausea:                             [ ]Mild [ ]Moderate [ ]Severe  Loss of appetite:              [ ]Mild [ ]Moderate [ ]Severe  Constipation:                    [ ]Mild [ ]Moderate [ ]Severe    Other Symptoms:  [ ]All other review of systems negative     Palliative Performance Status Version 2:    20     %    http://[email or web address]/files/news/palliative_performance_scale_[email or web address].pdf  PHYSICAL EXAM:  Vital Signs Last 24 Hrs  T(C): 37.5 (2020 07:00), Max: 38.1 (2020 06:00)  T(F): 99.5 (2020 07:00), Max: 100.6 (2020 06:00)  HR: 103 (2020 09:00) (80 - 142)  BP: --  BP(mean): --  RR: 24 (2020 09:00) (14 - 38)  SpO2: 98% (2020 09:00) (97% - 100%) I&O's Summary    2020 07:01  -  2020 07:00  --------------------------------------------------------  IN: 1600 mL / OUT: 1927 mL / NET: -327 mL    2020 07:01  -  2020 10:03  --------------------------------------------------------  IN: 180 mL / OUT: 0 mL / NET: 180 mL      GENERAL:  [ ]Alert  [ ]Oriented x   [ ]Lethargic  [ ]Cachexia  [ X]Unarousable  [ ]Verbal  [X ]Non-Verbal  Behavioral:   [ ] Anxiety  [ ] Delirium [ ] Agitation [ ] Other  HEENT:  [ ]Normal   [ ]Dry mouth   [X ]ET Tube/Trach  [ ]Oral lesions  PULMONARY:   [ ]Clear [ ]Tachypnea  [ ]Audible excessive secretions   [ ]Rhonchi        [ ]Right [ ]Left [ ]Bilateral  [ ]Crackles        [ ]Right [ ]Left [ ]Bilateral  [ ]Wheezing     [ ]Right [ ]Left [ ]Bilatera  [ ]Diminished breath sounds [ ]right [ ]left [ ]bilateral  CARDIOVASCULAR:    [ ]Regular [X ]Irregular [ ]Tachy  [ ]Ubaldo [ ]Murmur [ ]Other  GASTROINTESTINAL:  [X ]Soft/PENDULOUS   [ ]Distended   [X ]+BS  [ ]Non tender [ ]Tender  [ ]PEG [X ]OGT/ NGT  Last BM:   20 @ 07:01  -  20 @ 07:00  --------------------------------------------------------  OUT: 1 mL    20 @ 07:01  -  20 @ 07:00  --------------------------------------------------------  OUT: 1 mL      GENITOURINARY:  [ ]Normal [ X] Incontinent   [ ]Oliguria/Anuria   [ ]Antoine  MUSCULOSKELETAL:   [ ]Normal   [ ]Weakness  [X ]Bed/Wheelchair bound [ ]Edema  NEUROLOGIC:   [ ]No focal deficits  [X ]Cognitive impairment  [ ]Dysphagia [ ]Dysarthria [ ]Paresis [ ]Other   SKIN:   [ ]Normal    [ ]Rash  [ ]Pressure ulcer(s)       Present on admission [ ]y [ ]n    CRITICAL CARE:  [ ] Shock Present  [ ]Septic [ ]Cardiogenic [ X]Neurologic [ ]Hypovolemic  [ ]  Vasopressors [ ]  Inotropes   [ X]Respiratory failure present [ X]Mechanical ventilation [ ]Non-invasive ventilatory support [ ]High flow  [X ]Acute  [ ]Chronic [ ]Hypoxic  [ ]Hypercarbic [ ]Other  [ ]Other organ failure     LABS:                        8.0    14.20 )-----------( 375      ( 2020 00:27 )             27.4   06-02    143  |  106  |  15  ----------------------------<  188<H>  4.3   |  22  |  0.62    Ca    10.1      2020 00:27  Phos  4.3     06-02  Mg     1.9     06-02          RADIOLOGY & ADDITIONAL STUDIES:    < from: CT Head No Cont (20 @ 09:02) >      INTERPRETATION:  INDICATIONS:  left ich    TECHNIQUE:  Serial axial images were obtained from the skull base to the vertex without intravenous contrast.    COMPARISONEXAMINATION: 2020    FINDINGS:    VENTRICLES AND SULCI:  Unchanged mass effect on the left frontal horn of the lateral ventricle related to the left frontal hematoma as seen on the prior without significant interval change compared with the priorstudy. Small amount of layering hemorrhage in the occipital horns of the lateral ventricle  INTRA-AXIAL: No change in appearance of large left frontal hematoma with mass effect on the left frontal horn. Some associated subarachnoid hemorrhage is seen  EXTRA-AXIAL:  Subtle subarachnoid hemorrhage in the left sylvian region and in the interhemispheric fissure as seen on the prior as well.  VISUALIZED SINUSES:  Clear.  VISUALIZED MASTOIDS:  Clear.  CALVARIUM:  Normal.  MISCELLANEOUS: Patient is intubated with an NG tube    IMPRESSION:  No change from 2020        < from: CT Head No Cont (20 @ 08:23) >      INTERPRETATION:  INDICATIONS:  ICH    TECHNIQUE:  Serial axial images were obtained from the skull base to the vertex without intravenous contrast.    COMPARISON EXAMINATION: 2020.    FINDINGS:    VENTRICLES AND SULCI: Evidence of intraventricular extension of hemorrhage layering in the occipital horns of the lateral ventricle.  INTRA-AXIAL: Left frontal parenchymal hematoma appreciated unchanged in appearance compared with 2020   EXTRA-AXIAL:  No mass or collection is seen.  VISUALIZED SINUSES:  Clear.  VISUALIZED MASTOIDS:  Clear.  CALVARIUM:  Normal.  MISCELLANEOUS:  None.    IMPRESSION: Evidence of left frontal parenchymal hematoma with mass effect on the left lateral ventricle and intraventricular extension with blood layering in the occipital horns. No hydrocephalus. No change in the hematoma.            < end of copied text >      PROTEIN CALORIE MALNUTRITION PRESENT: [ ]mild [ ]moderate [ ]severe [ ]underweight [X ]morbid obesity  https://www.andeal.org/vault/2440/web/files/ONC/Table_Clinical%20Characteristics%20to%20Document%20Malnutrition-White%20JV%20et%20al%693489.pdf    Height (cm): 162.6 (20 @ 00:49), 152.4 (20 @ 19:18), 154.9 (20 @ 21:48)  Weight (kg): 99.6 (20 @ 00:49), 136.1 (20 @ 19:18), 98.2 (20 @ 23:57)  BMI (kg/m2): 37.7 (20 @ 00:49), 51.5 (20 @ 00:49), 58.6 (20 @ 19:18)    [ ]PPSV2 < or = to 30% [ ]significant weight loss  [ ]poor nutritional intake  [ ]anasarca     Albumin, Serum: 2.7 g/dL (20 @ 20:22)   [ ]Artificial Nutrition      REFERRALS:   [ ]Chaplaincy  [ ]Hospice  [ ]Child Life  [X ]Social Work  [ ]Case management [ ]Holistic Therapy     Goals of Care Document:

## 2020-06-03 NOTE — PROGRESS NOTE ADULT - ATTENDING COMMENTS
Agree with assessment and plan as above by Dr. Baum. Reviewed all pertinent labs, glucose values, and imaging studies. Modifications made as indicated above. Increase NPH q6 as above. Check Free T4 may need to decrease levothyroxine dose.    Marcio Dan D.O  [phone]

## 2020-06-03 NOTE — PROGRESS NOTE ADULT - ASSESSMENT
ASSESSMENT/PLAN:  64 year old female with Pmhx significant for Hypothyroidism, HTN, IDDM, COPD/CHRIS, RA 9on Prednisone), SBO s/p ex lap with lysis of adhesions c/b evisceration,  recent hospitalization in 3/2020 with encephalopathy, significantly hypothyroid (, without myxedema coma), small (stable) right sided SDH, UTI, catatonic reaction to VPA (now resolved) and DKA and psychiatric issues, 5/2020 hospitalization for GIB, Emphysematous cystitis, endometritis, Sigmoid colitis, morbid obesity, functional quadriplegia.  Presented on 5/29 with altered mental status.  Patient was confused, didn't recognize son. At 11 am she was gasping for air, ?seizure	    HCP: Norris Rush, Son [phone]; Daughter, Heavenly Schmidt [phone]; son asks that only him or his sister, Heavenly be contacted for patient updates/information as they are both proxy's for patient (29 May 2020 23:41)    NEURO:  -Q2 neuro checks  -VEEG in progress - runs of GPEDS,  -keppra for seizures, Vimpat added overnight   -Sedation fentanyl PRN   -tylenol prn for pain control  Activity: [] OOB as tolerated [X] Bedrest [X] PT [X] OT [] PMNR    PULM:  -Intubated since 5/29  -will discuss trach with family  -History of COPD was on prednisone at home, now on prednisone 15 mg daily   -CPAP as tolerated      CV:  --160  -TTE negative for PFO  -on norvasc 10 and metoprolol 12.5 BID   -continue home dose zocor    RENAL:  -Na goal 145-155  -IVL       GI:  Diet: On Glucerna Tube feeds  -Last BM 6/3  -Will discuss peg with family  GI prophylaxis [] not indicated [X] PPI [] other:  Bowel regimen [X] miralax [X] senna [] other:    ENDO:   -continue NPH 40 units q 6 hr and sliding scale  -continue synthroid 140 IV   -continue prednisone 15mg ( was on it at home)  Goal euglycemia (-180)    HEME/ONC:  - SQL  VTE prophylaxis: [X] SCDs [X] chemoprophylaxis [] high risk of DVT/PE on admission due to:    ID:  -Tmax 38.2, will reculture if spikes temp  -panculture from 5/30 neg so far    MISC: Palliative Care consult called    SOCIAL/FAMILY:  [X] updated family via phone  [] family meeting    CODE STATUS:  [X] Full Code [] DNR [] DNI [] Palliative/Comfort Care    DISPOSITION:  [X] ICU [] Stroke Unit [] Floor [] EMU [] RCU [] PCU    [X] Patient is at high risk of neurologic deterioration/death due to: resp failure    Time seen:  Time spent: _45__ [X] critical care minutes ASSESSMENT/PLAN:  64 year old female with Pmhx significant for Hypothyroidism, HTN, IDDM, COPD/CHRIS, RA 9on Prednisone), SBO s/p ex lap with lysis of adhesions c/b evisceration,  recent hospitalization in 3/2020 with encephalopathy, significantly hypothyroid (, without myxedema coma), small (stable) right sided SDH, UTI, catatonic reaction to VPA (now resolved) and DKA and psychiatric issues, 5/2020 hospitalization for GIB, Emphysematous cystitis, endometritis, Sigmoid colitis, morbid obesity, functional quadriplegia.  Presented on 5/29 with altered mental status.  Patient was confused, didn't recognize son. At 11 am she was gasping for air, ?seizure	    HCP: Norris Rush, Son [phone]; Daughter, Heavenly Schmidt [phone]; son asks that only him or his sister, Heavenly be contacted for patient updates/information as they are both proxy's for patient (29 May 2020 23:41)    NEURO:  -Q2 neuro checks  -VEEG in progress - runs of GPEDS,  -cont Keppra and Vimpat for seizures  -Sedation fentanyl PRN   -tylenol prn for pain control  Activity: [] OOB as tolerated [X] Bedrest [X] PT [X] OT [] PMNR    PULM:  -Intubated since 5/29  -will discuss trach with family  -History of COPD was on prednisone at home, now on prednisone 15 mg daily   -CPAP as tolerated      CV:  --160  -TTE negative for PFO  -on norvasc 10 and metoprolol 12.5 BID   -continue home dose zocor    RENAL:  -Na goal 145-155  -IVL       GI:  Diet: On Glucerna Tube feeds  -Last BM 6/3  -Will discuss peg with family  GI prophylaxis [] not indicated [X] PPI [] other:  Bowel regimen [X] miralax [X] senna [] other:    ENDO:   -continue NPH 40 units q 6 hr and sliding scale  -continue synthroid 140 IV   -continue prednisone 15mg ( was on it at home)  Goal euglycemia (-180)    HEME/ONC:  - SQL  VTE prophylaxis: [X] SCDs [X] chemoprophylaxis [] high risk of DVT/PE on admission due to:    ID:  -Tmax 38.2, will reculture if spikes temp  -panculture from 5/30 neg so far    MISC: Palliative Care consult called    SOCIAL/FAMILY:  [X] updated family via phone  [] family meeting    CODE STATUS:  [X] Full Code [] DNR [] DNI [] Palliative/Comfort Care    DISPOSITION:  [X] ICU [] Stroke Unit [] Floor [] EMU [] RCU [] PCU    [X] Patient is at high risk of neurologic deterioration/death due to: resp failure    Time seen:  Time spent: _45__ [X] critical care minutes ASSESSMENT/PLAN:  64 year old female with Pmhx significant for Hypothyroidism, HTN, IDDM, COPD/CHRIS, RA 9on Prednisone), SBO s/p ex lap with lysis of adhesions c/b evisceration,  recent hospitalization in 3/2020 with encephalopathy, significantly hypothyroid (, without myxedema coma), small (stable) right sided SDH, UTI, catatonic reaction to VPA (now resolved) and DKA and psychiatric issues, 5/2020 hospitalization for GIB, Emphysematous cystitis, endometritis, Sigmoid colitis, morbid obesity, functional quadriplegia.  Presented on 5/29 with altered mental status.  Patient was confused, didn't recognize son. At 11 am she was gasping for air, ?seizure	    HCP: Norris Rush, Son [phone]; Daughter, Heavenly Schmidt [phone]; son asks that only him or his sister, Heavenly be contacted for patient updates/information as they are both proxy's for patient (29 May 2020 23:41)    NEURO:  -Q2 neuro checks  -VEEG in progress - runs of GPEDS,  -cont Keppra and Vimpat for seizures  -Sedation fentanyl PRN   -tylenol prn for pain control  -given previous psych history and workup not revealing any cause, we would like to r/o NMDA encephalitis. Will send NMDA   Activity: [] OOB as tolerated [X] Bedrest [X] PT [X] OT [] PMNR    PULM:  -Intubated since 5/29  -will discuss trach with family  -History of COPD was on prednisone at home, now on prednisone 15 mg and  5 mg daily   -CPAP as tolerated      CV:  --160  -TTE negative for PFO  -on norvasc 10 and metoprolol 12.5 BID   -continue home dose zocor    RENAL:  -Na goal 145-155  -IVL       GI:  Diet: On Glucerna Tube feeds  -Last BM 6/3  -Will discuss peg with family  GI prophylaxis [] not indicated [X] PPI [] other:  Bowel regimen [X] miralax [X] senna [] other:    ENDO:   -continue NPH 40 units q 6 hr and sliding scale  -continue synthroid 150 IV   -continue prednisone 15mg , 5 mg at bedtime ( was on it at home)  Goal euglycemia (-180)    HEME/ONC:  - SQL  VTE prophylaxis: [X] SCDs [X] chemoprophylaxis [] high risk of DVT/PE on admission due to:    ID:  -Tmax 38.2, will reculture if spikes temp  -panculture from 5/30 neg so far    MISC: Palliative Care consult called    SOCIAL/FAMILY:  [X] updated family via phone  [] family meeting    CODE STATUS:  [X] Full Code [] DNR [] DNI [] Palliative/Comfort Care    DISPOSITION:  [X] ICU [] Stroke Unit [] Floor [] EMU [] RCU [] PCU    [X] Patient is at high risk of neurologic deterioration/death due to: resp failure    Time seen:  Time spent: _40_ [X] critical care minutes

## 2020-06-03 NOTE — PROGRESS NOTE ADULT - SUBJECTIVE AND OBJECTIVE BOX
Had fever and was found to have UTI, for which she was started on ceftriaxone.     EEG:  Abnormal EEG in an altered patient.  - Occasional brief runs of 0.5-1Hz generalized periodic discharges, maximal in the left and right frontotemporal regions, with shifting laterality, more prominent over the right.   - Subtle additional left frontal delta polymorphic slowing.  - Moderate to severe generalized slowing  - Push button event at 5am without pathological EEG correlate, no clear behavioral change seen on video    Family not amenable to comfort care and requesting on-going aggressive medical care.    EXAMINATION:  Intubated, eyes open transiently to noxious stimulus, pupils 3mm and reactive, +corneal reflexes, +cough/gag, +overbreathing vent set rate, no command following, left gaze preference, RUE trace movement, LUE spontaneous but only after being stimulated, BLE TF

## 2020-06-03 NOTE — CONSULT NOTE ADULT - PROBLEM SELECTOR RECOMMENDATION 6
Due to the patient's health status and restrictions on visitation during the [facility], the Advance Care Planning service was performed via______phone_______ with patients __son/surrogate Norris Rush __________with whom the discussion took place.   Mr. Rush shares good communication from medical team and has no questions or concerns.  He states "we are a Evangelical family and believe in Gods plan".  He verbalizes understanding of his mothers current issues and has hope for some level of recovery.   We briefly spoke of advance directives , Mr Rush wishes to pursue full resuscitative measures and continued aggressive medical interventions .   Again states he is a man of lauren and feels his mom is in Gods hands.   Goals clearly defined, patient will likely need trach and peg .  Signing off, please reconsult with any acute issues.

## 2020-06-03 NOTE — EEG REPORT - NS EEG TEXT BOX
[facility]   COMPREHENSIVE EPILEPSY CENTER   REPORT OF CONTINUOUS VIDEO EEG     [facility]: [address], [place] [address], Ph#: [phone]  [facility]: [id number], [place] [address], Ph#: [phone]  [facility]: [address] [place] [address], Ph#: [phone]    Patient Name: AFTAB BASS  Age and : 64y ([birth year])  MRN #: [id number]  Location: [place] [address]  Referring Physician: Kimo Thomason    Start Time/Date: 08:0 on 2020   Duration: 24h     _____________________________________________________________  STUDY INFORMATION    EEG Recording Technique:  The patient underwent continuous Video-EEG monitoring, using Telemetry System hardware on the XLTek Digital System. EEG and video data were stored on a computer hard drive with important events saved in digital archive files. The material was reviewed by a physician (electroencephalographer / epileptologist) on a daily basis. Moy and seizure detection algorithms were utilized and reviewed. An EEG Technician attended to the patient, and was available throughout daytime work hours.  The epilepsy center neurologist was available in person or on call 24-hours per day.    EEG Placement and Labeling of Electrodes:  The EEG was performed utilizing 20 channel referential EEG connections (coronal over temporal over parasagittal montage) using all standard 10-20 electrode placements with EKG, with additional electrodes placed in the inferior temporal region using the modified 10-10 montage electrode placements for elective admissions, or if deemed necessary. Recording was at a sampling rate of 256 samples per second per channel. Time synchronized digital video recording was done simultaneously with EEG recording. A low light infrared camera was used for low light recording.     _____________________________________________________________  HISTORY    Patient is a 64y old  Female who presents with a chief complaint of ICH, SAH w/ IVH (31 May 2020 06:43)      PERTINENT MEDICATION:  levETIRAcetam  IVPB 1000 milliGRAM(s) IV Intermittent every 12 hours  added Vimpat   _____________________________________________________________  STUDY INTERPRETATION    Findings: The background was spontaneously variable, reactive, and near-continuous with occasional periods of intermittent 1s of diffuse suppression, consisted of delta activity with intermittent overriding alpha and theta. No posterior dominant rhythm seen.    Background Slowing:  Diffuse theta and polymorphic delta slowing.    Focal Slowing:   Subtle additional left frontal delta polymorphic slowing.    Sleep Background:  Stage II sleep transients were not recorded but there were state changes.    Other Non-Epileptiform Findings:  None were present.    Interictal Epileptiform Activity:   Occasional brief runs of 0.5-1Hz generalized periodic discharges, bluntly contoured, maximal in the bilateral frontotemporal regions, with shifting laterality but more prominent over the right.     Events:  Clinical events: None recorded.  Seizures: None recorded.    Activation Procedures:   Hyperventilation was not performed.    Photic stimulation was not performed.     Artifacts:  Intermittent myogenic and movement artifacts were noted.    ECG:  The heart rate channel was artifactual    _____________________________________________________________  EEG SUMMARY/CLASSIFICATION    Abnormal EEG in an altered patient.  - Occasional brief runs of 0.5-1Hz generalized periodic discharges, maximal in the left and right frontotemporal regions, with shifting laterality, more prominent over the right.   - Subtle additional left frontal delta polymorphic slowing.  - Moderate to severe generalized slowing    _____________________________________________________________  EEG IMPRESSION/CLINICAL CORRELATE    Abnormal EEG study.  GPDs can be seen in the acutely ill but may bear an increased risk of seizures. In this record they are less prominent compared to the prior record.   Moderate to severe nonspecific diffuse or multifocal cerebral dysfunction.   Left frontal structural or functional cerebral dysfunction.  No seizure seen.  _____________________________________________________________    Delfin Campos MD PhD  Epilepsy Attending [facility]   COMPREHENSIVE EPILEPSY CENTER   REPORT OF CONTINUOUS VIDEO EEG     [facility]: [address], [place] [address], Ph#: [phone]  [facility]: [id number], [place] [address], Ph#: [phone]  [facility]: [address] [place] [address], Ph#: [phone]    Patient Name: AFTAB BASS  Age and : 64y ([birth year])  MRN #: [id number]  Location: [place] [address]  Referring Physician: Kimo Thomason    Start Time/Date: 08:0 on 2020   Duration: 24h     _____________________________________________________________  STUDY INFORMATION    EEG Recording Technique:  The patient underwent continuous Video-EEG monitoring, using Telemetry System hardware on the XLTek Digital System. EEG and video data were stored on a computer hard drive with important events saved in digital archive files. The material was reviewed by a physician (electroencephalographer / epileptologist) on a daily basis. Moy and seizure detection algorithms were utilized and reviewed. An EEG Technician attended to the patient, and was available throughout daytime work hours.  The epilepsy center neurologist was available in person or on call 24-hours per day.    EEG Placement and Labeling of Electrodes:  The EEG was performed utilizing 20 channel referential EEG connections (coronal over temporal over parasagittal montage) using all standard 10-20 electrode placements with EKG, with additional electrodes placed in the inferior temporal region using the modified 10-10 montage electrode placements for elective admissions, or if deemed necessary. Recording was at a sampling rate of 256 samples per second per channel. Time synchronized digital video recording was done simultaneously with EEG recording. A low light infrared camera was used for low light recording.     _____________________________________________________________  HISTORY    Patient is a 64y old  Female who presents with a chief complaint of ICH, SAH w/ IVH (31 May 2020 06:43)      PERTINENT MEDICATION:  levETIRAcetam  IVPB 1000 milliGRAM(s) IV Intermittent every 12 hours  added Vimpat   _____________________________________________________________  STUDY INTERPRETATION    Findings: The background was spontaneously variable, reactive, and near-continuous with occasional periods of intermittent 1s of diffuse suppression, consisted of delta activity with intermittent overriding alpha and theta. No posterior dominant rhythm seen.    Background Slowing:  Diffuse theta and polymorphic delta slowing.    Focal Slowing:   Subtle additional left frontal delta polymorphic slowing.    Sleep Background:  Stage II sleep transients were not recorded but there were state changes.    Other Non-Epileptiform Findings:  None were present.    Interictal Epileptiform Activity:   Occasional brief runs of 0.5-1Hz generalized periodic discharges, bluntly contoured, maximal in the bilateral frontotemporal regions, with shifting laterality but more prominent over the right.     Events:  Clinical events: None recorded.  Seizures: None recorded.  Push button event at 5:01am after patient was being handled. No behavioral change visible on video, there is mild additional diffuse muscle artifact throughout the cleaning process but no associated epileptiform eeg changes,    Activation Procedures:   Hyperventilation was not performed.    Photic stimulation was not performed.     Artifacts:  Intermittent myogenic and movement artifacts were noted.    ECG:  The heart rate channel was artifactual    _____________________________________________________________  EEG SUMMARY/CLASSIFICATION    Abnormal EEG in an altered patient.  - Occasional brief runs of 0.5-1Hz generalized periodic discharges, maximal in the left and right frontotemporal regions, with shifting laterality, more prominent over the right.   - Subtle additional left frontal delta polymorphic slowing.  - Moderate to severe generalized slowing  - Push button event at 5am without pathological EEG correlate, no clear behavioral change seen on video    _____________________________________________________________  EEG IMPRESSION/CLINICAL CORRELATE    Abnormal EEG study.  GPDs can be seen in the acutely ill but may bear an increased risk of seizures. In this record they are less prominent compared to the prior record.   Moderate to severe nonspecific diffuse or multifocal cerebral dysfunction.   Left frontal structural or functional cerebral dysfunction.  Push button event without associated pathological EEG changes.  No seizure seen.  _____________________________________________________________    Delfin Campos MD PhD  Epilepsy Attending

## 2020-06-03 NOTE — PROGRESS NOTE ADULT - ASSESSMENT
65 yo female with T2D uncontrolled while inpatient (HbA1c 7.4%) with no known complications and hypothyroidism here with mental status changes associated with shaking found to have a left frontal parenchymal hematoma with mass effect on the left lateral ventricle and intraventricular extension with blood layering in the occipital horns. Endocrine following for hyperglycemia.        Uncontrolled DM   FS goal 140-180, above goal   FS q6h  Currently on tube feed Glucerna at goal 65ml/hr  Patient currently on Prednison   Recommend increase NPH to 42units q6h   Recommend continue mod correctional scale q6h   Discharge on basal/bolus insulin     Hypothyroidism   TSH 12.3  Currently in Synthroid 150mcg IV qpm     HTN   Will defer management to NSCU team   continue metoprolol and norvasc    HLD   Continue statin 65 yo female with T2D uncontrolled while inpatient (HbA1c 7.4%) with no known complications and hypothyroidism here with mental status changes associated with shaking found to have a left frontal parenchymal hematoma with mass effect on the left lateral ventricle and intraventricular extension with blood layering in the occipital horns. Endocrine following for hyperglycemia.        Uncontrolled DM   FS goal 140-180, above goal   FS q6h  Currently on tube feed Glucerna at goal 65ml/hr  Patient currently on Prednisone  Recommend increase NPH to 42units q6h   Recommend continue mod correctional scale q6h   Discharge on basal/bolus insulin     Hypothyroidism   TSH 12.3  Currently in Synthroid 150mcg IV qpm     HTN   Will defer management to NSCU team   continue metoprolol and norvasc    HLD   Continue statin

## 2020-06-03 NOTE — PROGRESS NOTE ADULT - ASSESSMENT
Left frontal frontal intracerebral hemorrhage with cerebral edema  - cEEG; continue antiepileptics given prior GPDs  - -160mmHg; continue BP meds  - vent support; wean as tolerated but will likely need tracheostomy   - diabetes mellitus II, uncontrolled: on NPH, adjust as needed for -180  - UTI: centriaxone

## 2020-06-03 NOTE — CONSULT NOTE ADULT - ASSESSMENT
63 Y/O multiple medical issues as described above admitted with altered mental status  CTH with left frontal ICH/SAH/IVH.  Called for goals of care

## 2020-06-04 LAB
ANION GAP SERPL CALC-SCNC: 15 MMOL/L — SIGNIFICANT CHANGE UP (ref 5–17)
BUN SERPL-MCNC: 18 MG/DL — SIGNIFICANT CHANGE UP (ref 7–23)
CALCIUM SERPL-MCNC: 10 MG/DL — SIGNIFICANT CHANGE UP (ref 8.4–10.5)
CHLORIDE SERPL-SCNC: 105 MMOL/L — SIGNIFICANT CHANGE UP (ref 96–108)
CO2 SERPL-SCNC: 22 MMOL/L — SIGNIFICANT CHANGE UP (ref 22–31)
CREAT SERPL-MCNC: 0.67 MG/DL — SIGNIFICANT CHANGE UP (ref 0.5–1.3)
CULTURE RESULTS: SIGNIFICANT CHANGE UP
FACT XIII ACT/NOR PPP CHRO: 62 % — SIGNIFICANT CHANGE UP (ref 51–163)
GLUCOSE BLDC GLUCOMTR-MCNC: 136 MG/DL — HIGH (ref 70–99)
GLUCOSE BLDC GLUCOMTR-MCNC: 207 MG/DL — HIGH (ref 70–99)
GLUCOSE BLDC GLUCOMTR-MCNC: 239 MG/DL — HIGH (ref 70–99)
GLUCOSE BLDC GLUCOMTR-MCNC: 247 MG/DL — HIGH (ref 70–99)
GLUCOSE SERPL-MCNC: 217 MG/DL — HIGH (ref 70–99)
HCT VFR BLD CALC: 24.7 % — LOW (ref 34.5–45)
HGB BLD-MCNC: 7.3 G/DL — LOW (ref 11.5–15.5)
MAGNESIUM SERPL-MCNC: 2.1 MG/DL — SIGNIFICANT CHANGE UP (ref 1.6–2.6)
MCHC RBC-ENTMCNC: 28.5 PG — SIGNIFICANT CHANGE UP (ref 27–34)
MCHC RBC-ENTMCNC: 29.6 GM/DL — LOW (ref 32–36)
MCV RBC AUTO: 96.5 FL — SIGNIFICANT CHANGE UP (ref 80–100)
NRBC # BLD: 0 /100 WBCS — SIGNIFICANT CHANGE UP (ref 0–0)
PHOSPHATE SERPL-MCNC: 4.4 MG/DL — SIGNIFICANT CHANGE UP (ref 2.5–4.5)
PLATELET # BLD AUTO: 435 K/UL — HIGH (ref 150–400)
POTASSIUM SERPL-MCNC: 4.3 MMOL/L — SIGNIFICANT CHANGE UP (ref 3.5–5.3)
POTASSIUM SERPL-SCNC: 4.3 MMOL/L — SIGNIFICANT CHANGE UP (ref 3.5–5.3)
RBC # BLD: 2.56 M/UL — LOW (ref 3.8–5.2)
RBC # FLD: 23.4 % — HIGH (ref 10.3–14.5)
SODIUM SERPL-SCNC: 142 MMOL/L — SIGNIFICANT CHANGE UP (ref 135–145)
SPECIMEN SOURCE: SIGNIFICANT CHANGE UP
T4 FREE SERPL-MCNC: 2.7 NG/DL — HIGH (ref 0.9–1.8)
WBC # BLD: 13 K/UL — HIGH (ref 3.8–10.5)
WBC # FLD AUTO: 13 K/UL — HIGH (ref 3.8–10.5)

## 2020-06-04 PROCEDURE — 71045 X-RAY EXAM CHEST 1 VIEW: CPT | Mod: 26

## 2020-06-04 PROCEDURE — 99292 CRITICAL CARE ADDL 30 MIN: CPT

## 2020-06-04 PROCEDURE — 99232 SBSQ HOSP IP/OBS MODERATE 35: CPT | Mod: GC

## 2020-06-04 PROCEDURE — 99291 CRITICAL CARE FIRST HOUR: CPT

## 2020-06-04 PROCEDURE — 95718 EEG PHYS/QHP 2-12 HR W/VEEG: CPT

## 2020-06-04 RX ORDER — HUMAN INSULIN 100 [IU]/ML
42 INJECTION, SUSPENSION SUBCUTANEOUS
Refills: 0 | Status: DISCONTINUED | OUTPATIENT
Start: 2020-06-04 | End: 2020-06-04

## 2020-06-04 RX ORDER — LEVOTHYROXINE SODIUM 125 MCG
120 TABLET ORAL AT BEDTIME
Refills: 0 | Status: DISCONTINUED | OUTPATIENT
Start: 2020-06-04 | End: 2020-06-09

## 2020-06-04 RX ORDER — HUMAN INSULIN 100 [IU]/ML
40 INJECTION, SUSPENSION SUBCUTANEOUS
Refills: 0 | Status: DISCONTINUED | OUTPATIENT
Start: 2020-06-04 | End: 2020-06-05

## 2020-06-04 RX ORDER — HUMAN INSULIN 100 [IU]/ML
46 INJECTION, SUSPENSION SUBCUTANEOUS
Refills: 0 | Status: DISCONTINUED | OUTPATIENT
Start: 2020-06-04 | End: 2020-06-05

## 2020-06-04 RX ADMIN — LEVETIRACETAM 1000 MILLIGRAM(S): 250 TABLET, FILM COATED ORAL at 05:46

## 2020-06-04 RX ADMIN — CEFTRIAXONE 100 MILLIGRAM(S): 500 INJECTION, POWDER, FOR SOLUTION INTRAMUSCULAR; INTRAVENOUS at 17:33

## 2020-06-04 RX ADMIN — ENOXAPARIN SODIUM 40 MILLIGRAM(S): 100 INJECTION SUBCUTANEOUS at 17:32

## 2020-06-04 RX ADMIN — Medication 650 MILLIGRAM(S): at 12:48

## 2020-06-04 RX ADMIN — PANTOPRAZOLE SODIUM 40 MILLIGRAM(S): 20 TABLET, DELAYED RELEASE ORAL at 11:54

## 2020-06-04 RX ADMIN — CHLORHEXIDINE GLUCONATE 1 APPLICATION(S): 213 SOLUTION TOPICAL at 22:20

## 2020-06-04 RX ADMIN — Medication 4: at 11:54

## 2020-06-04 RX ADMIN — Medication 12.5 MILLIGRAM(S): at 17:33

## 2020-06-04 RX ADMIN — HUMAN INSULIN 42 UNIT(S): 100 INJECTION, SUSPENSION SUBCUTANEOUS at 17:34

## 2020-06-04 RX ADMIN — FENTANYL CITRATE 25 MICROGRAM(S): 50 INJECTION INTRAVENOUS at 12:19

## 2020-06-04 RX ADMIN — AMLODIPINE BESYLATE 10 MILLIGRAM(S): 2.5 TABLET ORAL at 05:47

## 2020-06-04 RX ADMIN — Medication 4: at 17:34

## 2020-06-04 RX ADMIN — LACOSAMIDE 200 MILLIGRAM(S): 50 TABLET ORAL at 17:32

## 2020-06-04 RX ADMIN — Medication 150 MICROGRAM(S): at 22:19

## 2020-06-04 RX ADMIN — Medication 15 MILLIGRAM(S): at 05:47

## 2020-06-04 RX ADMIN — FENTANYL CITRATE 25 MICROGRAM(S): 50 INJECTION INTRAVENOUS at 01:01

## 2020-06-04 RX ADMIN — Medication 12.5 MILLIGRAM(S): at 05:47

## 2020-06-04 RX ADMIN — CHLORHEXIDINE GLUCONATE 15 MILLILITER(S): 213 SOLUTION TOPICAL at 05:46

## 2020-06-04 RX ADMIN — SIMVASTATIN 20 MILLIGRAM(S): 20 TABLET, FILM COATED ORAL at 22:19

## 2020-06-04 RX ADMIN — CHLORHEXIDINE GLUCONATE 15 MILLILITER(S): 213 SOLUTION TOPICAL at 17:37

## 2020-06-04 RX ADMIN — Medication 650 MILLIGRAM(S): at 05:49

## 2020-06-04 RX ADMIN — LEVETIRACETAM 1000 MILLIGRAM(S): 250 TABLET, FILM COATED ORAL at 17:32

## 2020-06-04 RX ADMIN — HUMAN INSULIN 42 UNIT(S): 100 INJECTION, SUSPENSION SUBCUTANEOUS at 05:49

## 2020-06-04 RX ADMIN — HUMAN INSULIN 42 UNIT(S): 100 INJECTION, SUSPENSION SUBCUTANEOUS at 11:54

## 2020-06-04 RX ADMIN — LACOSAMIDE 200 MILLIGRAM(S): 50 TABLET ORAL at 05:46

## 2020-06-04 RX ADMIN — FENTANYL CITRATE 25 MICROGRAM(S): 50 INJECTION INTRAVENOUS at 22:15

## 2020-06-04 RX ADMIN — Medication 5 MILLIGRAM(S): at 22:19

## 2020-06-04 NOTE — PROGRESS NOTE ADULT - ATTENDING COMMENTS
Agree with assessment and plan as above by Dr. Baum. Reviewed all pertinent labs, glucose values, and imaging studies. Modifications made as indicated above. Lower NPH in evening and overnight to prevent low glucose values. Monitor on current doses during the day with 24 hour tube feeds.    Marcio Dan D.O  [phone] Agree with assessment and plan as above by Dr. Baum. Reviewed all pertinent labs, glucose values, and imaging studies. Modifications made as indicated above. Lower NPH in evening and overnight to prevent low glucose values. Increase doses during the day as she continues with 24 hour tube feeds.    Marcio Dan D.O  [phone]

## 2020-06-04 NOTE — PROGRESS NOTE ADULT - ASSESSMENT
Left frontal frontal intracerebral hemorrhage with cerebral edema  - continue antiepileptics given prior GPDs  - -160mmHg; continue BP meds  - vent support; wean as tolerated but will likely need tracheostomy   - diabetes mellitus II, uncontrolled: on NPH, adjust as needed for -180  - UTI: ceftriaxone  - VTE chemoppx

## 2020-06-04 NOTE — PROGRESS NOTE ADULT - ASSESSMENT
65 yo female with T2D uncontrolled while inpatient (HbA1c 7.4%) with no known complications and hypothyroidism here with mental status changes associated with shaking found to have a left frontal parenchymal hematoma with mass effect on the left lateral ventricle and intraventricular extension with blood layering in the occipital horns. Endocrine following for hyperglycemia.        Uncontrolled DM2   FS goal 140-180, above goal   FS q6h  Currently on tube feed Glucerna at goal 65ml/hr  Patient currently on Prednisone  Continue NPH 42units q6am and 12noon. Decrease NPH to 40units q6pm and 12midnight   Recommend continue mod correctional scale q6h   Discharge on basal/bolus insulin, dose to be determined     Hypothyroidism   5/30 TSH 12.3  6/4 FT4 2.7  Recommend decrease Synthroid to *** IV qpm     HTN   Will defer management to NSCU team   continue metoprolol and norvasc    HLD   Continue statin 63 yo female with T2D uncontrolled while inpatient (HbA1c 7.4%) with no known complications and hypothyroidism here with mental status changes associated with shaking found to have a left frontal parenchymal hematoma with mass effect on the left lateral ventricle and intraventricular extension with blood layering in the occipital horns. Endocrine following for hyperglycemia.        Uncontrolled DM2   FS goal 140-180, above goal   FS q6h  Currently on tube feed Glucerna at goal 65ml/hr  Patient currently on Prednisone  Increase NPH to 44 in the morning and afternoon doses; Decrease NPH to 40units q6pm and 12midnight   Recommend continue mod correctional scale q6h   Discharge on basal/bolus insulin, dose to be determined     Hypothyroidism   5/30 TSH 12.3  6/4 FT4 2.7  Recommend decrease Synthroid to 120 mcg IV qpm     HTN   Will defer management to NSCU team   continue metoprolol and norvasc    HLD   Continue statin

## 2020-06-04 NOTE — PROGRESS NOTE ADULT - SUBJECTIVE AND OBJECTIVE BOX
Ethics knows family well and relayed that daughter Heavenly Yangings also involved in care.     EXAMINATION:  Intubated, eyes open transiently to noxious stimulus, pupils 3mm and reactive, +corneal reflexes, +cough/gag, +overbreathing vent set rate, no command following, left gaze preference, RUE trace movement, LUE spontaneous but only after being stimulated, BLE minimal w/d

## 2020-06-04 NOTE — EEG REPORT - NS EEG TEXT BOX
[facility]   COMPREHENSIVE EPILEPSY CENTER   REPORT OF CONTINUOUS VIDEO EEG     [facility]: [address], [place] [address], Ph#: [phone]  [facility]: [id number], [place] [address], Ph#: [phone]  [facility]: [address] [place] [address], Ph#: [phone]    Patient Name: AFTAB BASS  Age and : 64y (12)  MRN #: [id number]  Location: [place] [address]  Referring Physician: Kimo Thomason    Start Time/Date: 08:00am on 2020   Duration: 7h59m     _____________________________________________________________  STUDY INFORMATION    EEG Recording Technique:  The patient underwent continuous Video-EEG monitoring, using Telemetry System hardware on the XLTek Digital System. EEG and video data were stored on a computer hard drive with important events saved in digital archive files. The material was reviewed by a physician (electroencephalographer / epileptologist) on a daily basis. Moy and seizure detection algorithms were utilized and reviewed. An EEG Technician attended to the patient, and was available throughout daytime work hours.  The epilepsy center neurologist was available in person or on call 24-hours per day.    EEG Placement and Labeling of Electrodes:  The EEG was performed utilizing 20 channel referential EEG connections (coronal over temporal over parasagittal montage) using all standard 10-20 electrode placements with EKG, with additional electrodes placed in the inferior temporal region using the modified 10-10 montage electrode placements for elective admissions, or if deemed necessary. Recording was at a sampling rate of 256 samples per second per channel. Time synchronized digital video recording was done simultaneously with EEG recording. A low light infrared camera was used for low light recording.     _____________________________________________________________  HISTORY    Patient is a 64y old  Female who presents with a chief complaint of ICH, SAH w/ IVH (31 May 2020 06:43)      PERTINENT MEDICATION:  levETIRAcetam  IVPB 1000 milliGRAM(s) IV Intermittent every 12 hours  added Vimpat   _____________________________________________________________  STUDY INTERPRETATION    Findings: The background was spontaneously variable, reactive, and near-continuous with occasional periods of intermittent 1s of diffuse suppression, consisted of delta activity with intermittent overriding alpha and theta. No posterior dominant rhythm seen.    Background Slowing:  Diffuse theta and polymorphic delta slowing.    Focal Slowing:   Subtle additional left frontal delta polymorphic slowing.    Sleep Background:  Stage II sleep transients were not recorded but there were state changes.    Other Non-Epileptiform Findings:  None were present.    Interictal Epileptiform Activity:   Rare brief runs of 0.5-1Hz generalized periodic discharges, very bluntly contoured, maximal in the bilateral frontotemporal regions. in this record they often appear more prominent over the right, diffuse, as LPD. They are very blunt and not very epileptiform appearing.     Events:  Clinical events: None recorded.  Seizures: None recorded.    Activation Procedures:   Hyperventilation was not performed.    Photic stimulation was not performed.     Artifacts:  Intermittent myogenic and movement artifacts were noted.    ECG:  The heart rate channel was artifactual    _____________________________________________________________  EEG SUMMARY/CLASSIFICATION    Abnormal EEG in an altered patient.  - Rare brief runs of 0.5-1Hz generalized periodic discharges, very bluntly contoured, maximal in the bilateral frontotemporal regions. in this record they often appear more prominent over the right, diffuse, as LPDs.   - left frontal delta polymorphic slowing.  - Moderate to severe generalized slowing  _____________________________________________________________  EEG IMPRESSION/CLINICAL CORRELATE    Abnormal EEG study, largely unchanged besides the GPDs being less prominent and showing more asymmetry towards the right.     No seizures present.     Moderate to severe nonspecific diffuse or multifocal cerebral dysfunction.   Left frontal structural or functional cerebral dysfunction.   _____________________________________________________________    Delfin Campos MD PhD  Epilepsy Attending

## 2020-06-04 NOTE — CONSULT NOTE ADULT - SUBJECTIVE AND OBJECTIVE BOX
64 year old woman with new intercranial hemorrhage and complex past medical history, known to Medical Ethics Consultation service since March 2019.  In Sept 2019 the patient executed a living will and appointed both her daughter Heavenly Akins and son Norris Rush as co-decision makers.     Ms. Akins can be reached at [phone]      FULL CONSULT to follow      Sneha Altamirano M.Div., BSN, [facility]  Medical Ethics  [phone] 64 year old woman with new intercranial hemorrhage and complex past medical history, known to Medical Ethics Consultation service since March 2019.  In Sept 2019 the patient executed a living will and appointed both her daughter Heavenly Akins and son Norris Rush as co-decision makers.     In [facility] admission January 24 2020 Patient's living will states that in event of devastating injury patient would not want continued life sustaining treatment.    Ms. Akins can be reached at [phone]      FULL CONSULT to follow      Sneha Altamirano M.Div., BSN, [facility]  Medical Ethics  [phone] 64 year old woman with new intercranial hemorrhage and complex past medical history, known to Medical Ethics Consultation service since March 2019.  Team has approached son for medical decisions regarding trach and peg and he has pulled back communication, team is concerned over his actions and contacted Ethics consultation. Consult requested by: RAMO Kowalski    Role: PA   Service: Neurosurgical     Contact#(s): [phone]  Attending: NATALEE Thomason [phone]  Consultant: Sneha Altamirano M.Div., BSN, [facility]- (office) [phone]  Consult purpose:   To assist the team in navigating the ethical complexities associated with a 64 year old woman with intracranial hemorrhage and on mechanical ventilation whose son has withdrawn communication regarding goals of care.   CLINICAL SUMMARY  64 year old female admitted with change in mental status and possible seizure activity. Found on CT Scan to have left frontal intracerebral hemorrhage and cerebral edema which was not present on a February 20, 2020 CT Scan.  Patient intubated and sedated in neurosurgical ICU.   Patient and family are well known to the Ethics Service (Feb/March 2019 and January 2020) with a PMH of functional paraplegia (bedbound), hypothyroidism, pulmonary hypertension (HTN), Diabetes Mellitus (DM) on insulin, COPD/CHRIS (on nocturnal CPAP and 3L home oxygen), Rheumatoid Arthritis on chronic prednisone, chronic tremors, Small Bowel Obstruction s/p exploratory laparotomy with lysis of adhesions (2018) (4 retention sutures) complicated by evisceration secondary to a coughing episode s/p exploratory laparotomy(2018) (6 retention sutures) complicated by multifocal pneumonia, developed hypercapnic respiratory failure requiring intubation 1/6/19, s/p bronch 1/9/19, and extubated on 1/24/19, course further complicated by Enterococci bacteremia presented to the ED for altered mental status x 3 weeks.   With history obtained from previous admissions (chart review) the patient’s son and daughter reported that patient had been exhibiting paranoia and accusing the family of stealing as well as calling the police on multiple occasions claiming son was trying to hurt her as well as accusing him of "stealing her oxygen". Recently has been spitting at [facility] and caretaker son, throwing her bedpan filled with urine and feces at them. In the ED (January 2020 admission, the patient became agitated requiring zyprexa total 20mg, haloperidol 10, and versed 8mg. CT Scan of Head obtained revealed no acute intracranial hemorrhage or mass effect. Patient was discharged in March 2020 to a skilled nursing facility.   During this admission, the team has been primarily in contact with son Norris who has told the team today that he needs the weekend to discuss with family and for them to not call him. Team is concerned over loss of contact with surrogate decision maker and reached out to Ethics as a resource.     Prognosis Estimate (survival in hrs, days, wks, mos, yrs):  guarded   Patient Decision-Making Capacity:  Has Capacity  No capacity- medically sedated and intubated  Patient Aware of:  Diagnosis:  Yes   No   Unknown    Prognosis:   Yes    No   Unknown       Name of medical decision-maker should patient lack capacity (relationship): Burdickshon Hernández, Daughter [phone] and Norris DOTYJennifer Victor Manuel WALKER, Son [phone]. Both named as equal decision makers in Living Will dated 9/3/19.  Role:   Health Care Agent     Legal Surrogate     Other Decision-Maker (i.e., HCA or Surrogate) Aware of:  Diagnosis: Yes   No      Prognosis:   Yes     No  Other Stake-Holders:    Evidence of Patient’s Preference of Life-Sustaining Treatment (Written or Oral): LIVING WILL SEPT 2019  Resuscitation status:   DNR:  Yes   No      DNI:  Yes   No            Discussion:   6/4/[phone]- over phone, discussed with KEELEY Kowalski the patient’s history with Ethics and assisted in identifying the two decision makers the patient appointed in 2019. Curtis Pisano (who has been present) and catherine Burdick with her appropriate contact information.  PA will be contacting catherine Burdick and will follow up accordingly.

## 2020-06-04 NOTE — PROVIDER CONTACT NOTE (OTHER) - SITUATION
On assessment, pt has blood tinged oral & endotracheal secretions and ecchymotic skin throughout body

## 2020-06-04 NOTE — PROGRESS NOTE ADULT - SUBJECTIVE AND OBJECTIVE BOX
SUBJECTIVE: Pt seen and examined.     Vital Signs Last 24 Hrs  T(C): 37.7 (04 Jun 2020 07:00), Max: 38.3 (03 Jun 2020 11:00)  T(F): 99.9 (04 Jun 2020 07:00), Max: 100.9 (03 Jun 2020 11:00)  HR: 100 (04 Jun 2020 07:00) (100 - 127)  RR: 24 (04 Jun 2020 07:00) (15 - 35)  SpO2: 99% (04 Jun 2020 07:00) (97% - 100%)                        7.8    13.73 )-----------( 391      ( 03 Jun 2020 22:15 )             25.6    06-03    145  |  106  |  16  ----------------------------<  93  3.7   |  25  |  0.63    Ca    10.1      03 Jun 2020 22:15  Phos  4.6     06-03  Mg     1.9     06-03    NEUROIMAGING: < from: CT Head No Cont (06.02.20 @ 09:02) >  IMPRESSION:  No change from 6/1/2020    PHYSICAL EXAM:    General: No Acute Distress     Neurological: Intubated, eyes open transiently to noxious stimulus, pupils 3mm and reactive, +corneal reflexes, +cough/gag, +overbreathing vent set rate, no command following, left gaze preference, RUE trace movement, LUE spontaneous but only after being stimulated, BLE TF    Pulmonary: Clear to Auscultation, No Rales, No Rhonchi, No Wheezes     Cardiovascular: S1, S2, Regular Rate and Rhythm     Gastrointestinal: Soft, Nontender, Nondistended     MEDICATIONS:   Antibiotics:  cefTRIAXone   IVPB      cefTRIAXone   IVPB 1000 milliGRAM(s) IV Intermittent every 24 hours    Neuro:  acetaminophen    Suspension .. 650 milliGRAM(s) Oral every 6 hours PRN Temp greater or equal to 38C (100.4F), Mild Pain (1 - 3)  fentaNYL    Injectable 25 MICROGram(s) IV Push every 2 hours PRN Severe Pain (7 - 10)  lacosamide Solution 200 milliGRAM(s) Oral two times a day  levETIRAcetam  Solution 1000 milliGRAM(s) Oral two times a day    Anticoagulation:  enoxaparin Injectable 40 milliGRAM(s) SubCutaneous <User Schedule>    Cardiology:  amLODIPine   Tablet 10 milliGRAM(s) Oral daily  metoprolol tartrate 12.5 milliGRAM(s) Oral two times a day  simvastatin 20 milliGRAM(s) Oral at bedtime    Endo:   insulin lispro (HumaLOG) corrective regimen sliding scale   SubCutaneous every 6 hours  insulin NPH human recombinant 42 Unit(s) SubCutaneous every 6 hours  levothyroxine Injectable 150 MICROGram(s) IV Push at bedtime  predniSONE   Tablet 5 milliGRAM(s) Oral at bedtime  predniSONE   Tablet 15 milliGRAM(s) Oral <User Schedule>    Pulm:    GI/:  pantoprazole  Injectable 40 milliGRAM(s) IV Push daily  polyethylene glycol 3350 17 Gram(s) Oral every 12 hours  senna 2 Tablet(s) Oral at bedtime    Other:  chlorhexidine 0.12% Liquid 15 milliLiter(s) Oral Mucosa every 12 hours  chlorhexidine 4% Liquid 1 Application(s) Topical <User Schedule>  dextrose 5%. 1000 milliLiter(s) IV Continuous <Continuous>  sodium chloride 0.9% lock flush 10 milliLiter(s) IV Push every 1 hour PRN Pre/post blood products, medications, blood draw, and to maintain line patency HPI: 64 year old female with PMHx of Hypothyroidism, HTN, DM, COPD/CHRIS, RA (Prednisone), SBO s/p ex lap with lysis of adhesions c/b evisceration, recent hospitalization in 3/2020 with encephalopathy, significantly hypothyroid (, without myxedema coma), small (stable) right sided SDH, UTI, catatonic reaction to VPA (now resolved) and DKA and psychiatric issues, 5/2020 hospitalization for GIB, Emphysematous cystitis, endometritis, Sigmoid colitis, morbid obesity, functional quadriplegia. Presented on 5/29 with altered mental status.  Patient was confused, didn't recognize son. Also found to be gasping for air, ?seizure.     HOSP COURSE:  6/3: CPAP 15/5.   6/3: Pancultured for fever. UA positive    Overnight events: Afebrile    Vital Signs Last 24 Hrs  T(C): 37.7 (04 Jun 2020 07:00), Max: 38.3 (03 Jun 2020 11:00)  T(F): 99.9 (04 Jun 2020 07:00), Max: 100.9 (03 Jun 2020 11:00)  HR: 100 (04 Jun 2020 07:00) (100 - 127)  RR: 24 (04 Jun 2020 07:00) (15 - 35)  SpO2: 99% (04 Jun 2020 07:00) (97% - 100%)                        7.8    13.73 )-----------( 391      ( 03 Jun 2020 22:15 )             25.6    06-03    145  |  106  |  16  ----------------------------<  93  3.7   |  25  |  0.63    Ca    10.1      03 Jun 2020 22:15  Phos  4.6     06-03  Mg     1.9     06-03    NEUROIMAGING: < from: CT Head No Cont (06.02.20 @ 09:02) >  IMPRESSION:  No change from 6/1/2020    PHYSICAL EXAM:    General: No Acute Distress     Neurological: Intubated, eyes open transiently to noxious stimulus, pupils 3mm and reactive, +corneal reflexes, +cough/gag, +overbreathing vent set rate, no command following, left gaze preference, RUE trace movement, LUE spontaneous but only after being stimulated, BLE TF    Pulmonary: Clear to Auscultation, No Rales, No Rhonchi, No Wheezes     Cardiovascular: S1, S2, Regular Rate and Rhythm     Gastrointestinal: Soft, Nontender, Nondistended     MEDICATIONS:   Antibiotics:  cefTRIAXone   IVPB      cefTRIAXone   IVPB 1000 milliGRAM(s) IV Intermittent every 24 hours    Neuro:  acetaminophen    Suspension .. 650 milliGRAM(s) Oral every 6 hours PRN Temp greater or equal to 38C (100.4F), Mild Pain (1 - 3)  fentaNYL    Injectable 25 MICROGram(s) IV Push every 2 hours PRN Severe Pain (7 - 10)  lacosamide Solution 200 milliGRAM(s) Oral two times a day  levETIRAcetam  Solution 1000 milliGRAM(s) Oral two times a day    Anticoagulation:  enoxaparin Injectable 40 milliGRAM(s) SubCutaneous <User Schedule>    Cardiology:  amLODIPine   Tablet 10 milliGRAM(s) Oral daily  metoprolol tartrate 12.5 milliGRAM(s) Oral two times a day  simvastatin 20 milliGRAM(s) Oral at bedtime    Endo:   insulin lispro (HumaLOG) corrective regimen sliding scale   SubCutaneous every 6 hours  insulin NPH human recombinant 42 Unit(s) SubCutaneous every 6 hours  levothyroxine Injectable 150 MICROGram(s) IV Push at bedtime  predniSONE   Tablet 5 milliGRAM(s) Oral at bedtime  predniSONE   Tablet 15 milliGRAM(s) Oral <User Schedule>    Pulm:    GI/:  pantoprazole  Injectable 40 milliGRAM(s) IV Push daily  polyethylene glycol 3350 17 Gram(s) Oral every 12 hours  senna 2 Tablet(s) Oral at bedtime    Other:  chlorhexidine 0.12% Liquid 15 milliLiter(s) Oral Mucosa every 12 hours  chlorhexidine 4% Liquid 1 Application(s) Topical <User Schedule>  dextrose 5%. 1000 milliLiter(s) IV Continuous <Continuous>  sodium chloride 0.9% lock flush 10 milliLiter(s) IV Push every 1 hour PRN Pre/post blood products, medications, blood draw, and to maintain line patency

## 2020-06-04 NOTE — EEG REPORT - NS EEG TEXT BOX
[facility]   COMPREHENSIVE EPILEPSY CENTER   REPORT OF CONTINUOUS VIDEO EEG     [facility]: [address], [place] [address], Ph#: [phone]  [facility]: [id number], [place] [address], Ph#: [phone]  [facility]: [address] [place] [address], Ph#: [phone]    Patient Name: AFTAB BASS  Age and : 64y ([birth year])  MRN #: [id number]  Location: [place] [address]  Referring Physician: Kimo Thomason    Start Time/Date: 08:0 on 2020   Duration: 24h     _____________________________________________________________  STUDY INFORMATION    EEG Recording Technique:  The patient underwent continuous Video-EEG monitoring, using Telemetry System hardware on the XLTek Digital System. EEG and video data were stored on a computer hard drive with important events saved in digital archive files. The material was reviewed by a physician (electroencephalographer / epileptologist) on a daily basis. Moy and seizure detection algorithms were utilized and reviewed. An EEG Technician attended to the patient, and was available throughout daytime work hours.  The epilepsy center neurologist was available in person or on call 24-hours per day.    EEG Placement and Labeling of Electrodes:  The EEG was performed utilizing 20 channel referential EEG connections (coronal over temporal over parasagittal montage) using all standard 10-20 electrode placements with EKG, with additional electrodes placed in the inferior temporal region using the modified 10-10 montage electrode placements for elective admissions, or if deemed necessary. Recording was at a sampling rate of 256 samples per second per channel. Time synchronized digital video recording was done simultaneously with EEG recording. A low light infrared camera was used for low light recording.     _____________________________________________________________  HISTORY    Patient is a 64y old  Female who presents with a chief complaint of ICH, SAH w/ IVH (31 May 2020 06:43)      PERTINENT MEDICATION:  levETIRAcetam  IVPB 1000 milliGRAM(s) IV Intermittent every 12 hours  added Vimpat   _____________________________________________________________  STUDY INTERPRETATION    Findings: The background was spontaneously variable, reactive, and near-continuous with occasional periods of intermittent 1s of diffuse suppression, consisted of delta activity with intermittent overriding alpha and theta. No posterior dominant rhythm seen.    Background Slowing:  Diffuse theta and polymorphic delta slowing.    Focal Slowing:   Subtle additional left frontal delta polymorphic slowing.    Sleep Background:  Stage II sleep transients were not recorded but there were state changes.    Other Non-Epileptiform Findings:  None were present.    Interictal Epileptiform Activity:   Rare brief runs of 0.5-1Hz generalized periodic discharges, very bluntly contoured, maximal in the bilateral frontotemporal regions. in this record they often appear more prominent over the right, diffuse, as LPD. They are very blunt and not very epileptiform appearing.     Events:  Clinical events: None recorded.  Seizures: None recorded.    Activation Procedures:   Hyperventilation was not performed.    Photic stimulation was not performed.     Artifacts:  Intermittent myogenic and movement artifacts were noted.    ECG:  The heart rate channel was artifactual    _____________________________________________________________  EEG SUMMARY/CLASSIFICATION    Abnormal EEG in an altered patient.  - Rare brief runs of 0.5-1Hz generalized periodic discharges, very bluntly contoured, maximal in the bilateral frontotemporal regions. in this record they often appear more prominent over the right, diffuse, as LPDs.   - left frontal delta polymorphic slowing.  - Moderate to severe generalized slowing  _____________________________________________________________  EEG IMPRESSION/CLINICAL CORRELATE    Abnormal EEG study, largely unchanged besides the GPDs being less prominent and showing more asymmetry towards the right.     No seizures present.     Moderate to severe nonspecific diffuse or multifocal cerebral dysfunction.   Left frontal structural or functional cerebral dysfunction.   _____________________________________________________________    Delfin Campos MD PhD  Epilepsy Attending

## 2020-06-04 NOTE — PROGRESS NOTE ADULT - SUBJECTIVE AND OBJECTIVE BOX
Chief Complaint: management of DM2    History: Patient seen and examined at bedside. Patient remains critically ill, intubated, OGT on glucerna at 65mL/hr. Overnight patient had low BG 88. NPH 20 given. Tube feed were not held at any point overnight.     MEDICATIONS  (STANDING):  amLODIPine   Tablet 10 milliGRAM(s) Oral daily  cefTRIAXone   IVPB      cefTRIAXone   IVPB 1000 milliGRAM(s) IV Intermittent every 24 hours  chlorhexidine 0.12% Liquid 15 milliLiter(s) Oral Mucosa every 12 hours  chlorhexidine 4% Liquid 1 Application(s) Topical <User Schedule>  dextrose 5%. 1000 milliLiter(s) (50 mL/Hr) IV Continuous <Continuous>  dextrose 50% Injectable 12.5 Gram(s) IV Push once  dextrose 50% Injectable 25 Gram(s) IV Push once  dextrose 50% Injectable 25 Gram(s) IV Push once  enoxaparin Injectable 40 milliGRAM(s) SubCutaneous <User Schedule>  insulin lispro (HumaLOG) corrective regimen sliding scale   SubCutaneous every 6 hours  insulin NPH human recombinant 42 Unit(s) SubCutaneous every 6 hours  lacosamide Solution 200 milliGRAM(s) Oral two times a day  levETIRAcetam  Solution 1000 milliGRAM(s) Oral two times a day  levothyroxine Injectable 150 MICROGram(s) IV Push at bedtime  metoprolol tartrate 12.5 milliGRAM(s) Oral two times a day  pantoprazole  Injectable 40 milliGRAM(s) IV Push daily  predniSONE   Tablet 5 milliGRAM(s) Oral at bedtime  predniSONE   Tablet 15 milliGRAM(s) Oral <User Schedule>  simvastatin 20 milliGRAM(s) Oral at bedtime    MEDICATIONS  (PRN):  acetaminophen    Suspension .. 650 milliGRAM(s) Oral every 6 hours PRN Temp greater or equal to 38C (100.4F), Mild Pain (1 - 3)  dextrose 40% Gel 15 Gram(s) Oral once PRN Blood Glucose LESS THAN 70 milliGRAM(s)/deciliter  fentaNYL    Injectable 25 MICROGram(s) IV Push every 2 hours PRN Severe Pain (7 - 10)  glucagon  Injectable 1 milliGRAM(s) IntraMuscular once PRN Glucose LESS THAN 70 milligrams/deciliter  sodium chloride 0.9% lock flush 10 milliLiter(s) IV Push every 1 hour PRN Pre/post blood products, medications, blood draw, and to maintain line patency      PHYSICAL EXAM:  VITALS: T(C): 37.3 (06-04-20 @ 15:00)  T(F): 99.1 (06-04-20 @ 15:00), Max: 100 (06-03-20 @ 19:00)  HR: 126 (06-04-20 @ 15:00) (100 - 127)  BP: --  RR:  (17 - 35)  SpO2:  (97% - 100%)  Wt(kg): 99.6kg   GENERAL: critically ill, well developed  RESPIRATORY: Clear to auscultation bilaterally; intubated   CARDIOVASCULAR: Regular rate and rhythm; No murmurs; no peripheral edema  GI: Soft, nontender, non distended, normal bowel sounds, +OGT  MUSCULOSKELETAL: unable to assess   PSYCH: no responsive to verbal stimuli     POCT Blood Glucose.: 247 mg/dL (06-04-20 @ 11:43) NPH 42+ 4  POCT Blood Glucose.: 207 mg/dL (06-04-20 @ 08:09) NPH 42  POCT Blood Glucose.: 136 mg/dL (06-04-20 @ 04:57)   POCT Blood Glucose.: 93 mg/dL (06-03-20 @ 23:23) NPH 20  POCT Blood Glucose.: 88 mg/dL (06-03-20 @ 23:18)  POCT Blood Glucose.: 197 mg/dL (06-03-20 @ 17:10)NPH 42+2  POCT Blood Glucose.: 228 mg/dL (06-03-20 @ 12:39)  POCT Blood Glucose.: 207 mg/dL (06-03-20 @ 05:15)  POCT Blood Glucose.: 173 mg/dL (06-03-20 @ 00:30)  POCT Blood Glucose.: 139 mg/dL (06-02-20 @ 21:35)  POCT Blood Glucose.: 179 mg/dL (06-02-20 @ 17:29)  POCT Blood Glucose.: 264 mg/dL (06-02-20 @ 14:04)  POCT Blood Glucose.: 259 mg/dL (06-02-20 @ 10:41)  POCT Blood Glucose.: 309 mg/dL (06-02-20 @ 05:58)  POCT Blood Glucose.: 186 mg/dL (06-02-20 @ 00:52)  POCT Blood Glucose.: 257 mg/dL (06-01-20 @ 21:46)  POCT Blood Glucose.: 297 mg/dL (06-01-20 @ 17:17)      06-03    145  |  106  |  16  ----------------------------<  93  3.7   |  25  |  0.63    EGFR if : 111  EGFR if non : 96    Ca    10.1      06-03  Mg     1.9     06-03  Phos  4.6     06-03      Thyroid Function Tests:    06-04 @ FreeT4 2.7   05-30 @ 04:44 TSH 12.30 T3 51

## 2020-06-04 NOTE — EEG REPORT - THIS IS A
Continuous Video EEG

## 2020-06-04 NOTE — CONSULT NOTE ADULT - ASSESSMENT
In [facility]- admission January 27-3/27/2020  patient's living will is captured and has the patient's decision for both Heavenly and Norris as decision makers. It also states that in event of devastating injury patient would not want continued life sustaining treatment.      FULL CONSULT PENDING        Sneha Altamirano M.Div., BSN, [facility]  Medical Ethics  [phone] Bioethics analysis:  THE CENTRAL ETHICAL ISSUE IS AUTONOMY AND SURROGATE DECISION MAKING FOR A PATIENT WITHOUT CAPACITY    The primary ethical principle involved in this case is respect for patient autonomy.     The bioethical principle of autonomy – here represented by a thoughtful consideration of the appropriate surrogate to make decisions for a patient without capacity. Physicians and health care providers have a role in protecting the patient from harm by safeguarding the patient’s best interests through determining the appropriate qualifications surrogate decision maker. In this case, the health team is applauded for creating an atmosphere of clarity in communication regarding finding the appropriate primary surrogate decision maker. The patient when she was able to do so, appointed both her son and daughter in a living will as her decision makers. At this juncture, the [facility] Decision Act supports this decision.     Since the patient is without full capacity, informed consent for medical procedures, including discharge planning, would need to be obtained from a surrogate decision-maker. The 2010 [facility] Care Decision Act (CDA) addresses the situation of a sick individual who lacks capacity, but has an available surrogate: the surrogate has the exclusive right to make decisions about whether to use or forgo life sustaining treatments (LSTs) when it appears that the patient will die without them. The ethical standard the surrogate is supposed to apply to such decision-making is, first "substituted judgment" – based on the patient’s prior articulation of preferences for such a situation or second "best interests". The CDA establishes a hierarchy of surrogacy, in order of priority decision maker for the incapable patient:     – an [facility] Article 81 court-appointed guardian (in this case, there is not one);   – the spouse or domestic partner as defined in the CDA who is either ([facility] § 2994-a);   – an adult child;   – a parent;   – a brother or sister;   – a close friend which includes all other family members and friends.    It is in the vein, through previous Ethics consults the patient when she had capacity to appoint decision makers for when she is unable, she formulated a Living Will in Sept 2019 that appoints both her son Norris and daughter Heavenly as her decision makers together.     What is concerning to the staff is that the patient’s son Norris has pulled back on communication with the team citing that he needs time not knowing that the patient had identified both Norris and Heavenly for medical decision making.   Additionally, her living will is on file within [facility] for review.     Thank you for this challenging consult.     Sneha Altamirano M.Div., BSN, PCCN, [facility]-C   Clinical Ethics Consultant   Division of Medical Ethics   [phone]      WRITTEN BY GALI OWENS, BSN, [facility]-C   DISCUSSED WITH DAVINA TIMMONS  MORE THAN 50% OF THE TIME OF THIS CONSULTATION WAS SPENT IN COORDINATION OF CARE OF PATIENT

## 2020-06-04 NOTE — PROGRESS NOTE ADULT - ASSESSMENT
ASSESSMENT:  64 year old female with PMHx of Hypothyroidism, HTN, DM, COPD/CHRIS, RA (Prednisone), SBO s/p ex lap with lysis of adhesions c/b evisceration, recent hospitalization in 3/2020 with encephalopathy, significantly hypothyroid (, without myxedema coma), small (stable) right sided SDH, UTI, catatonic reaction to VPA (now resolved) and DKA and psychiatric issues, 5/2020 hospitalization for GIB, Emphysematous cystitis, endometritis, Sigmoid colitis, morbid obesity, functional quadriplegia. Presented on 5/29 with altered mental status.  Patient was confused, didn't recognize son. Also found to be gasping for air, ?seizure. 	    HCP: Norris Rush, Son [phone]; Daughter, Heavenly Schmidt [phone]; son asks that only him or his sisterHeavenly be contacted for patient updates/information as they are both proxy's for patient (29 May 2020 23:41)    NEURO:  Q2 neuro checks  VEEG in progress - runs of GPEDS,  cont Keppra and Vimpat for seizures  Sedation fentanyl PRN   Pain management with Tylenol   Given previous psych history and workup not revealing any cause, we would like to r/o NMDA encephalitis. Follow up report    Activity: [] OOB as tolerated [X] Bedrest [X] PT [X] OT [] PMNR    PULM:  Intubated since 5/29  After Palliative discussion with family. Will likely proceed with Trach. Will follow up with son today   History of COPD was on prednisone at home, now on prednisone 15 mg and  5 mg daily   CPAP as tolerated      CV:  Keep -160  TTE negative for PFO  HTN: continue norvasc and metoprolol    Continue home dose zocor  Statin for HLD     RENAL:  Na goal 145-155  IVL     GI:  Diet: On Glucerna Tube feeds  Last BM 6/3  Will discuss peg with family  GI prophylaxis [] not indicated [X] PPI [] other:  Bowel regimen [X] miralax [X] senna [] other:    ENDO:   Continue NPH 40 units Q6 and HISS  Continue synthroid 150 IV   Continue prednisone 15mg , 5 mg at bedtime (was on it at home for RA)  Goal euglycemia (-180)    HEME/ONC:  -SQL  VTE prophylaxis: [X] SCDs [X] chemoprophylaxis [] high risk of DVT/PE on admission due to:    ID:  Tmax 38.2, will reculture if spikes temp  Panculture from 5/30 neg so far    MISC: Palliative Care consult called    SOCIAL/FAMILY:  [X] updated family via phone  [] family meeting    CODE STATUS:  [X] Full Code [] DNR [] DNI [] Palliative/Comfort Care    DISPOSITION:  [X] ICU [] Stroke Unit [] Floor [] EMU [] RCU [] PCU    [X] Patient is at high risk of neurologic deterioration/death due to: resp failure    Time seen:  Time spent: _45_ [X] critical care minutes ASSESSMENT:  64 year old female with PMHx of Hypothyroidism, HTN, DM, COPD/CHRIS, RA (Prednisone), SBO s/p ex lap with lysis of adhesions c/b evisceration, recent hospitalization in 3/2020 with encephalopathy, significantly hypothyroid (, without myxedema coma), small (stable) right sided SDH, UTI, catatonic reaction to VPA (now resolved) and DKA and psychiatric issues, 5/2020 hospitalization for GIB, Emphysematous cystitis, endometritis, Sigmoid colitis, morbid obesity, functional quadriplegia. Presented on 5/29 with altered mental status.  Patient was confused, didn't recognize son. Also found to be gasping for air, ?seizure. 	    HCP: Norris Rush, Son [phone]; Daughter, Heavenly Schmidt [phone]; son asks that only him or his sisterHeavenly be contacted for patient updates/information as they are both proxy's for patient (29 May 2020 23:41)    NEURO:  Q2 neuro checks  VEEG in progress - runs of GPEDS,  cont Keppra and Vimpat for seizures  Sedation fentanyl PRN   Pain management with Tylenol   Given previous psych history and workup not revealing any cause, we would like to r/o NMDA encephalitis. Follow up report    Activity: [] OOB as tolerated [X] Bedrest [X] PT [X] OT [] PMNR    PULM:  Intubated since 5/29  After Palliative discussion with family. Will likely proceed with Trach. Will follow up with son today   History of COPD was on prednisone at home, now on prednisone 15 mg and  5 mg daily   CPAP as tolerated      CV:  Keep -160  TTE negative for PFO  HTN: continue norvasc and metoprolol    Continue home dose zocor  Statin for HLD     RENAL:  Na goal 145-155  IVL     GI:  Diet: On Glucerna Tube feeds  Last BM 6/3  Will discuss peg with family  GI prophylaxis [] not indicated [X] PPI [] other:  Bowel regimen [X] miralax [X] senna [] other:    ENDO:   Continue NPH 40 units Q6 and HISS  Continue synthroid 150 IV   Continue prednisone 15mg , 5 mg at bedtime (was on it at home for RA)  Goal euglycemia (-180)    HEME/ONC:  -SQL  VTE prophylaxis: [X] SCDs [X] chemoprophylaxis [] high risk of DVT/PE on admission due to:    ID:  Tmax 38.2 Recultured 6/3  Panculture from 5/30 neg so far    MISC: Palliative Care consult called    SOCIAL/FAMILY:  [X] updated family via phone  [] family meeting    CODE STATUS:  [X] Full Code [] DNR [] DNI [] Palliative/Comfort Care    DISPOSITION:  [X] ICU [] Stroke Unit [] Floor [] EMU [] RCU [] PCU    [X] Patient is at high risk of neurologic deterioration/death due to: resp failure    Time seen:  Time spent: _45_ [X] critical care minutes ASSESSMENT:  64 year old female with PMHx of Hypothyroidism, HTN, DM, COPD/CHRIS, RA (Prednisone), SBO s/p ex lap with lysis of adhesions c/b evisceration, recent hospitalization in 3/2020 with encephalopathy, significantly hypothyroid (, without myxedema coma), small (stable) right sided SDH, UTI, catatonic reaction to VPA (now resolved) and DKA and psychiatric issues, 5/2020 hospitalization for GIB, Emphysematous cystitis, endometritis, Sigmoid colitis, morbid obesity, functional quadriplegia. Presented on 5/29 with altered mental status. Patient was confused, didn't recognize son. Also found to be gasping for air, ?seizure. 	  HCP: Norris Rush, Son [phone]; Daughter, Heavenly Schmidt [phone]; son asks that only him or his sisterHeavenly be contacted for patient updates/information as they are both proxy's for patient (29 May 2020 23:41)      NEURO:  Q4 neuro checks  VEEG in progress - runs of GPDS. (f/u VEEG 6/4- of no seizures DC VEEG)  Cont Keppra and Vimpat for seizures  Sedation fentanyl PRN   Pain management with Tylenol   Given previous psych history and workup not revealing any cause, we would like to r/o NMDA encephalitis. Follow up report   Will discuss trach and PEG with family   Activity: [] OOB as tolerated [X] Bedrest [X] PT [X] OT [] PMNR    PULM:  Intubated since 5/29  After Palliative discussion with family. Will likely proceed with Trach. Will follow up with son today   History of COPD was on prednisone at home, now on prednisone 15 mg and  5 mg daily   CPAP as tolerated- did not tolerate 15/5.     CV:  Keep -160  TTE negative for PFO  HTN: continue norvasc and metoprolol    Continue home dose zocor  Statin for HLD     RENAL:  Na goal 145-155  IVL     GI:  Diet: On Glucerna Tube feeds  Last BM 6/3. Has rectal tube.   Will discuss PEG with family  GI prophylaxis [] not indicated [X] PPI [] other:  Bowel regimen [X] miralax [X] senna [] other:. Will DC due to diarrhea.     ENDO:   Continue NPH 42 units Q6 and HISS  Continue synthroid 150 IV. Endocrine following  Continue prednisone 15mg, 5 mg at bedtime (was on it at home for RA)  Goal euglycemia (-180)    HEME/ONC:  SQL  VTE prophylaxis: [X] SCDs [X] chemoprophylaxis [] high risk of DVT/PE on admission due to:    ID:  Tmax 38.2 Recultured 6/3. UA positive. On ceftriaxone. Follow up urine cx  Panculture from 5/30 neg so far    MISC: Palliative Care consult called    SOCIAL/FAMILY:  [X] updated family via phone  [] family meeting    CODE STATUS:  [X] Full Code [] DNR [] DNI [] Palliative/Comfort Care    DISPOSITION:  [X] ICU [] Stroke Unit [] Floor [] EMU [] RCU [] PCU    [X] Patient is at high risk of neurologic deterioration/death due to: resp failure    Time spent: _45_ [X] critical care minutes

## 2020-06-05 LAB
GLUCOSE BLDC GLUCOMTR-MCNC: 113 MG/DL — HIGH (ref 70–99)
GLUCOSE BLDC GLUCOMTR-MCNC: 187 MG/DL — HIGH (ref 70–99)
GLUCOSE BLDC GLUCOMTR-MCNC: 221 MG/DL — HIGH (ref 70–99)
GLUCOSE BLDC GLUCOMTR-MCNC: 247 MG/DL — HIGH (ref 70–99)
GLUCOSE BLDC GLUCOMTR-MCNC: 285 MG/DL — HIGH (ref 70–99)
GLUCOSE BLDC GLUCOMTR-MCNC: 92 MG/DL — SIGNIFICANT CHANGE UP (ref 70–99)
GLUCOSE BLDC GLUCOMTR-MCNC: 92 MG/DL — SIGNIFICANT CHANGE UP (ref 70–99)

## 2020-06-05 PROCEDURE — 99232 SBSQ HOSP IP/OBS MODERATE 35: CPT

## 2020-06-05 PROCEDURE — 99291 CRITICAL CARE FIRST HOUR: CPT

## 2020-06-05 PROCEDURE — 99292 CRITICAL CARE ADDL 30 MIN: CPT

## 2020-06-05 PROCEDURE — 71045 X-RAY EXAM CHEST 1 VIEW: CPT | Mod: 26

## 2020-06-05 RX ORDER — HUMAN INSULIN 100 [IU]/ML
44 INJECTION, SUSPENSION SUBCUTANEOUS EVERY 6 HOURS
Refills: 0 | Status: DISCONTINUED | OUTPATIENT
Start: 2020-06-05 | End: 2020-06-05

## 2020-06-05 RX ORDER — ENOXAPARIN SODIUM 100 MG/ML
30 INJECTION SUBCUTANEOUS
Refills: 0 | Status: DISCONTINUED | OUTPATIENT
Start: 2020-06-05 | End: 2020-07-14

## 2020-06-05 RX ORDER — SODIUM CHLORIDE 9 MG/ML
250 INJECTION INTRAMUSCULAR; INTRAVENOUS; SUBCUTANEOUS ONCE
Refills: 0 | Status: COMPLETED | OUTPATIENT
Start: 2020-06-05 | End: 2020-06-05

## 2020-06-05 RX ORDER — HUMAN INSULIN 100 [IU]/ML
40 INJECTION, SUSPENSION SUBCUTANEOUS
Refills: 0 | Status: DISCONTINUED | OUTPATIENT
Start: 2020-06-05 | End: 2020-06-05

## 2020-06-05 RX ORDER — HUMAN INSULIN 100 [IU]/ML
46 INJECTION, SUSPENSION SUBCUTANEOUS
Refills: 0 | Status: DISCONTINUED | OUTPATIENT
Start: 2020-06-05 | End: 2020-06-05

## 2020-06-05 RX ORDER — HUMAN INSULIN 100 [IU]/ML
40 INJECTION, SUSPENSION SUBCUTANEOUS EVERY 6 HOURS
Refills: 0 | Status: DISCONTINUED | OUTPATIENT
Start: 2020-06-05 | End: 2020-06-06

## 2020-06-05 RX ORDER — FENTANYL CITRATE 50 UG/ML
25 INJECTION INTRAVENOUS
Refills: 0 | Status: DISCONTINUED | OUTPATIENT
Start: 2020-06-05 | End: 2020-06-12

## 2020-06-05 RX ADMIN — LACOSAMIDE 200 MILLIGRAM(S): 50 TABLET ORAL at 05:14

## 2020-06-05 RX ADMIN — Medication 12.5 MILLIGRAM(S): at 17:11

## 2020-06-05 RX ADMIN — AMLODIPINE BESYLATE 10 MILLIGRAM(S): 2.5 TABLET ORAL at 05:14

## 2020-06-05 RX ADMIN — Medication 4: at 00:56

## 2020-06-05 RX ADMIN — PANTOPRAZOLE SODIUM 40 MILLIGRAM(S): 20 TABLET, DELAYED RELEASE ORAL at 11:33

## 2020-06-05 RX ADMIN — Medication 120 MICROGRAM(S): at 21:15

## 2020-06-05 RX ADMIN — FENTANYL CITRATE 25 MICROGRAM(S): 50 INJECTION INTRAVENOUS at 03:00

## 2020-06-05 RX ADMIN — HUMAN INSULIN 40 UNIT(S): 100 INJECTION, SUSPENSION SUBCUTANEOUS at 23:40

## 2020-06-05 RX ADMIN — FENTANYL CITRATE 25 MICROGRAM(S): 50 INJECTION INTRAVENOUS at 01:00

## 2020-06-05 RX ADMIN — FENTANYL CITRATE 25 MICROGRAM(S): 50 INJECTION INTRAVENOUS at 13:15

## 2020-06-05 RX ADMIN — SODIUM CHLORIDE 500 MILLILITER(S): 9 INJECTION INTRAMUSCULAR; INTRAVENOUS; SUBCUTANEOUS at 14:07

## 2020-06-05 RX ADMIN — Medication 6: at 05:14

## 2020-06-05 RX ADMIN — Medication 12.5 MILLIGRAM(S): at 05:14

## 2020-06-05 RX ADMIN — HUMAN INSULIN 46 UNIT(S): 100 INJECTION, SUSPENSION SUBCUTANEOUS at 05:15

## 2020-06-05 RX ADMIN — Medication 15 MILLIGRAM(S): at 05:14

## 2020-06-05 RX ADMIN — LEVETIRACETAM 1000 MILLIGRAM(S): 250 TABLET, FILM COATED ORAL at 17:08

## 2020-06-05 RX ADMIN — CEFTRIAXONE 100 MILLIGRAM(S): 500 INJECTION, POWDER, FOR SOLUTION INTRAMUSCULAR; INTRAVENOUS at 17:08

## 2020-06-05 RX ADMIN — CHLORHEXIDINE GLUCONATE 1 APPLICATION(S): 213 SOLUTION TOPICAL at 21:15

## 2020-06-05 RX ADMIN — SIMVASTATIN 20 MILLIGRAM(S): 20 TABLET, FILM COATED ORAL at 21:14

## 2020-06-05 RX ADMIN — Medication 2: at 17:10

## 2020-06-05 RX ADMIN — CHLORHEXIDINE GLUCONATE 15 MILLILITER(S): 213 SOLUTION TOPICAL at 05:15

## 2020-06-05 RX ADMIN — LEVETIRACETAM 1000 MILLIGRAM(S): 250 TABLET, FILM COATED ORAL at 05:14

## 2020-06-05 RX ADMIN — LACOSAMIDE 200 MILLIGRAM(S): 50 TABLET ORAL at 17:09

## 2020-06-05 RX ADMIN — HUMAN INSULIN 40 UNIT(S): 100 INJECTION, SUSPENSION SUBCUTANEOUS at 00:57

## 2020-06-05 RX ADMIN — ENOXAPARIN SODIUM 30 MILLIGRAM(S): 100 INJECTION SUBCUTANEOUS at 17:10

## 2020-06-05 RX ADMIN — CHLORHEXIDINE GLUCONATE 15 MILLILITER(S): 213 SOLUTION TOPICAL at 17:10

## 2020-06-05 RX ADMIN — SODIUM CHLORIDE 250 MILLILITER(S): 9 INJECTION INTRAMUSCULAR; INTRAVENOUS; SUBCUTANEOUS at 13:30

## 2020-06-05 RX ADMIN — Medication 650 MILLIGRAM(S): at 11:33

## 2020-06-05 RX ADMIN — SODIUM CHLORIDE 250 MILLILITER(S): 9 INJECTION INTRAMUSCULAR; INTRAVENOUS; SUBCUTANEOUS at 17:09

## 2020-06-05 RX ADMIN — HUMAN INSULIN 44 UNIT(S): 100 INJECTION, SUSPENSION SUBCUTANEOUS at 14:08

## 2020-06-05 RX ADMIN — Medication 5 MILLIGRAM(S): at 21:15

## 2020-06-05 NOTE — PROGRESS NOTE ADULT - SUBJECTIVE AND OBJECTIVE BOX
Day team reached out to family re: trach/PEG but no definitive plan obtained as line became disconnected and further efforts to reach family unsuccessful.     EXAMINATION:  Intubated, eyes open transiently to noxious stimulus, pupils 3mm and reactive, +corneal reflexes, +cough/gag, +overbreathing vent set rate, no command following, left gaze preference, RUE trace movement, LUE spontaneous but only after being stimulated, BLE minimal w/d

## 2020-06-05 NOTE — PROGRESS NOTE ADULT - ASSESSMENT
Left frontal frontal intracerebral hemorrhage with cerebral edema  - continue antiepileptics given GPDs  - -160mmHg; continue BP meds  - vent support; wean as tolerated but will likely need tracheostomy   - diabetes mellitus II, uncontrolled: on NPH, adjust as needed for -180  - UTI: ceftriaxone  - VTE chemoppx   - continue to reach out to family; ethics support

## 2020-06-05 NOTE — PROGRESS NOTE ADULT - SUBJECTIVE AND OBJECTIVE BOX
SUBJECTIVE: Pt seen and examined.     OVERNIGHT EVENTS: No overnight events    Vital Signs Last 24 Hrs  T(C): 37.6 (05 Jun 2020 05:00), Max: 37.9 (04 Jun 2020 19:00)  T(F): 99.7 (05 Jun 2020 05:00), Max: 100.2 (04 Jun 2020 19:00)  HR: 113 (05 Jun 2020 06:00) (109 - 128)  RR: 18 (05 Jun 2020 06:00) (17 - 31)  SpO2: 100% (05 Jun 2020 06:00) (94% - 100%)                        7.3    13.00 )-----------( 435      ( 04 Jun 2020 21:48 )             24.7    06-04    142  |  105  |  18  ----------------------------<  217<H>  4.3   |  22  |  0.67    Ca    10.0      04 Jun 2020 21:48  Phos  4.4     06-04  Mg     2.1     06-04    PHYSICAL EXAM:    General: No Acute Distress     Neurological: Intubated, eyes open transiently to noxious stimulus, pupils 3mm and reactive, +corneal reflexes, +cough/gag, +overbreathing vent set rate, no command following, left gaze preference, RUE trace movement, LUE spontaneous but only after being stimulated, BLE TF    Pulmonary: Clear to Auscultation, No Rales, No Rhonchi, No Wheezes     Cardiovascular: S1, S2, Regular Rate and Rhythm     Gastrointestinal: Soft, Nontender, Nondistended     MEDICATIONS:   Antibiotics:  cefTRIAXone   IVPB      cefTRIAXone   IVPB 1000 milliGRAM(s) IV Intermittent every 24 hours    Neuro:  acetaminophen    Suspension .. 650 milliGRAM(s) Oral every 6 hours PRN Temp greater or equal to 38C (100.4F), Mild Pain (1 - 3)  fentaNYL    Injectable 25 MICROGram(s) IV Push every 2 hours PRN Severe Pain (7 - 10)  lacosamide Solution 200 milliGRAM(s) Oral two times a day  levETIRAcetam  Solution 1000 milliGRAM(s) Oral two times a day    Anticoagulation:  enoxaparin Injectable 40 milliGRAM(s) SubCutaneous <User Schedule>    Cardiology:  amLODIPine   Tablet 10 milliGRAM(s) Oral daily  metoprolol tartrate 12.5 milliGRAM(s) Oral two times a day  simvastatin 20 milliGRAM(s) Oral at bedtime    Endo:   insulin lispro (HumaLOG) corrective regimen sliding scale   SubCutaneous every 6 hours  insulin NPH human recombinant 40 Unit(s) SubCutaneous <User Schedule>  insulin NPH human recombinant 46 Unit(s) SubCutaneous <User Schedule>  levothyroxine Injectable 120 MICROGram(s) IV Push at bedtime  predniSONE   Tablet 5 milliGRAM(s) Oral at bedtime  predniSONE   Tablet 15 milliGRAM(s) Oral <User Schedule>    Pulm:    GI/:  pantoprazole  Injectable 40 milliGRAM(s) IV Push daily    Other:  chlorhexidine 0.12% Liquid 15 milliLiter(s) Oral Mucosa every 12 hours  chlorhexidine 4% Liquid 1 Application(s) Topical <User Schedule>  dextrose 5%. 1000 milliLiter(s) IV Continuous <Continuous>  sodium chloride 0.9% lock flush 10 milliLiter(s) IV Push every 1 hour PRN Pre/post blood products, medications, blood draw, and to maintain line patency HPI: 64 year old female with PMHx of Hypothyroidism, HTN, DM, COPD/CHRIS, RA (Prednisone), SBO s/p ex lap with lysis of adhesions c/b evisceration, recent hospitalization in 3/2020 with encephalopathy, significantly hypothyroid (, without myxedema coma), small (stable) right sided SDH, UTI, catatonic reaction to VPA (now resolved) and DKA and psychiatric issues, 5/2020 hospitalization for GIB, Emphysematous cystitis, endometritis, Sigmoid colitis, morbid obesity, functional quadriplegia. Presented on 5/29 with altered mental status. Patient was confused, didn't recognize son. Also found to be gasping for air, ?seizure.    SUBJECTIVE: Pt seen and examined.     OVERNIGHT EVENTS: Afebrile      Vital Signs Last 24 Hrs  T(C): 37.6 (05 Jun 2020 05:00), Max: 37.9 (04 Jun 2020 19:00)  T(F): 99.7 (05 Jun 2020 05:00), Max: 100.2 (04 Jun 2020 19:00)  HR: 113 (05 Jun 2020 06:00) (109 - 128)  RR: 18 (05 Jun 2020 06:00) (17 - 31)  SpO2: 100% (05 Jun 2020 06:00) (94% - 100%)                        7.3    13.00 )-----------( 435      ( 04 Jun 2020 21:48 )             24.7    06-04    142  |  105  |  18  ----------------------------<  217<H>  4.3   |  22  |  0.67    Ca    10.0      04 Jun 2020 21:48  Phos  4.4     06-04  Mg     2.1     06-04    PHYSICAL EXAM:    General: No Acute Distress     Neurological: Intubated, eyes open transiently to noxious stimulus, pupils 3mm and reactive, +corneal reflexes, +cough/gag, +overbreathing vent set rate, no command following, left gaze preference, RUE trace movement, LUE spontaneous but only after being stimulated, BLE TF    Pulmonary: Clear to Auscultation, No Rales, No Rhonchi, No Wheezes     Cardiovascular: S1, S2, Regular Rate and Rhythm     Gastrointestinal: Soft, Nontender, Nondistended     MEDICATIONS:   Antibiotics:  cefTRIAXone   IVPB      cefTRIAXone   IVPB 1000 milliGRAM(s) IV Intermittent every 24 hours    Neuro:  acetaminophen    Suspension .. 650 milliGRAM(s) Oral every 6 hours PRN Temp greater or equal to 38C (100.4F), Mild Pain (1 - 3)  fentaNYL    Injectable 25 MICROGram(s) IV Push every 2 hours PRN Severe Pain (7 - 10)  lacosamide Solution 200 milliGRAM(s) Oral two times a day  levETIRAcetam  Solution 1000 milliGRAM(s) Oral two times a day    Anticoagulation:  enoxaparin Injectable 40 milliGRAM(s) SubCutaneous <User Schedule>    Cardiology:  amLODIPine   Tablet 10 milliGRAM(s) Oral daily  metoprolol tartrate 12.5 milliGRAM(s) Oral two times a day  simvastatin 20 milliGRAM(s) Oral at bedtime    Endo:   insulin lispro (HumaLOG) corrective regimen sliding scale   SubCutaneous every 6 hours  insulin NPH human recombinant 40 Unit(s) SubCutaneous <User Schedule>  insulin NPH human recombinant 46 Unit(s) SubCutaneous <User Schedule>  levothyroxine Injectable 120 MICROGram(s) IV Push at bedtime  predniSONE   Tablet 5 milliGRAM(s) Oral at bedtime  predniSONE   Tablet 15 milliGRAM(s) Oral <User Schedule>    Pulm:    GI/:  pantoprazole  Injectable 40 milliGRAM(s) IV Push daily    Other:  chlorhexidine 0.12% Liquid 15 milliLiter(s) Oral Mucosa every 12 hours  chlorhexidine 4% Liquid 1 Application(s) Topical <User Schedule>  dextrose 5%. 1000 milliLiter(s) IV Continuous <Continuous>  sodium chloride 0.9% lock flush 10 milliLiter(s) IV Push every 1 hour PRN Pre/post blood products, medications, blood draw, and to maintain line patency

## 2020-06-05 NOTE — PROGRESS NOTE ADULT - ASSESSMENT
ASSESSMENT:  64 year old female with PMHx of Hypothyroidism, HTN, DM, COPD/CHRIS, RA (Prednisone), SBO s/p ex lap with lysis of adhesions c/b evisceration, recent hospitalization in 3/2020 with encephalopathy, significantly hypothyroid (, without myxedema coma), small (stable) right sided SDH, UTI, catatonic reaction to VPA (now resolved) and DKA and psychiatric issues, 5/2020 hospitalization for GIB, Emphysematous cystitis, endometritis, Sigmoid colitis, morbid obesity, functional quadriplegia. Presented on 5/29 with altered mental status. Patient was confused, didn't recognize son. Also found to be gasping for air, ?seizure. 	  HCP: Norris Rush, Son [phone]; Daughter, Heavenly Schmidt [phone]; son asks that only him or his sisterHeavenly be contacted for patient updates/information as they are both proxy's for patient. Medical ethics contacted.     NEURO:  Q4 neuro checks  Cont Keppra and Vimpat for seizures  Sedation fentanyl PRN   Pain management with Tylenol   Given previous psych history and workup not revealing any cause, we would like to r/o NMDA encephalitis. Follow up report   Trach and PEG discussed with family and son wants to more time. Case well known to medical ethics, will contact daughter today.   Activity: [] OOB as tolerated [X] Bedrest [X] PT [X] OT [] PMNR    PULM:  Intubated since 5/29  History of COPD was on prednisone at home, now on prednisone 15 mg and  5 mg daily   CPAP as tolerated- did not tolerate 15/5.     CV:  Keep -160  TTE negative for PFO  HTN: continue norvasc and metoprolol    Statin for HLD     RENAL:  Na goal 145-155  IVL     GI:  Diet: On Glucerna Tube feeds  Last BM 6/4. Has rectal tube. Bowel regimen d/c   Will discuss PEG with family  GI prophylaxis [] not indicated [X] PPI [] other:    ENDO:   Endo Following NPH 40 at 00:00/18:00. NPH 46 06:00/12:00. HISS  Continue synthroid 150 IV. Endocrine following  Continue prednisone 15mg, 5 mg at bedtime (was on it at home for RA)  Goal euglycemia (-180)    HEME/ONC:  SQL  VTE prophylaxis: [X] SCDs [X] chemoprophylaxis [] high risk of DVT/PE on admission due to:    ID:  Tmax 38.2 Recultured 6/3. UA positive. On ceftriaxone. Follow up urine cx  Panculture from 5/30 neg so far    MISC: Palliative Care consult called    SOCIAL/FAMILY:  [X] updated family via phone  [] family meeting    CODE STATUS:  [X] Full Code [] DNR [] DNI [] Palliative/Comfort Care    DISPOSITION:  [X] ICU [] Stroke Unit [] Floor [] EMU [] RCU [] PCU    [X] Patient is at high risk of neurologic deterioration/death due to: resp failure    Time spent: _45_ [X] critical care minutes ASSESSMENT:  64 year old female with PMHx of Hypothyroidism, HTN, DM, COPD/CHRIS, RA (Prednisone), SBO s/p ex lap with lysis of adhesions c/b evisceration, recent hospitalization in 3/2020 with encephalopathy, significantly hypothyroid (, without myxedema coma), small (stable) right sided SDH, UTI, catatonic reaction to VPA (now resolved) and DKA and psychiatric issues, 5/2020 hospitalization for GIB, Emphysematous cystitis, endometritis, Sigmoid colitis, morbid obesity, functional quadriplegia. Presented on 5/29 with altered mental status. Patient was confused, didn't recognize son. Also found to be gasping for air, ?seizure. 	  HCP: Norris Rush, Son [phone]; Daughter, Heavenly Schmidt [phone]; son asks that only him or his sisterHeavenly be contacted for patient updates/information as they are both proxy's for patient. Medical ethics contacted.       NEURO:  Q4 neuro checks  Cont Keppra and Vimpat for seizures  Sedation fentanyl PRN   Pain management with Tylenol   Possible storming-fentanyl doesn't improve HR  Given previous psych history and workup not revealing any cause, we would like to r/o NMDA encephalitis. Follow up report   Trach and PEG discussed with family and son wants to more time. Case well known to medical ethics, will contact daughter today.   Activity: [] OOB as tolerated [X] Bedrest [X] PT [X] OT [] PMNR      PULM:  Intubated since 5/29  History of COPD was on prednisone at home, now on prednisone 15 mg and  5 mg daily   CPAP as tolerated- did not tolerate 15/5.     CV:  Keep -160  TTE negative for PFO  HTN: continue norvasc and metoprolol    Statin for HLD     RENAL:  Na goal 145-155  IVL   NS 250cc bolus since tachycardic and insensible losses    GI:  Diet: On Glucerna Tube feeds  Last BM 6/4. Has rectal tube. Bowel regimen d/c   Will discuss PEG with family  GI prophylaxis [] not indicated [X] PPI [] other:      ENDO:   Endo Following NPH 40 at 00:00/18:00. NPH 44 06:00/12:00. HISS  Continue synthroid 150 IV. Endocrine following  Continue prednisone 15mg, 5 mg at bedtime (was on it at home for RA)  Goal euglycemia (-180)      HEME/ONC:  SQL  VTE prophylaxis: [X] SCDs [X] chemoprophylaxis [] high risk of DVT/PE on admission due to:  LE doppler 5/30- No DVT    ID:  Tmax 38.2 Recultured 6/3. UA positive. On ceftriaxone. Follow up urine cx  Thus far GNR. Await speciation  Panculture from 5/30 neg so far      MISC: Palliative Care consult called    SOCIAL/FAMILY:  [X] updated family via phone  [] family meeting    CODE STATUS:  [X] Full Code [] DNR [] DNI [] Palliative/Comfort Care    DISPOSITION:  [X] ICU [] Stroke Unit [] Floor [] EMU [] RCU [] PCU    [X] Patient is at high risk of neurologic deterioration/death due to: resp failure    Time spent: _45_ [X] critical care minutes ASSESSMENT:  64 year old female with PMHx of Hypothyroidism, HTN, DM, COPD/CHRIS, RA (Prednisone), SBO s/p ex lap with lysis of adhesions c/b evisceration, recent hospitalization in 3/2020 with encephalopathy, significantly hypothyroid (, without myxedema coma), small (stable) right sided SDH, UTI, catatonic reaction to VPA (now resolved) and DKA and psychiatric issues, 5/2020 hospitalization for GIB, Emphysematous cystitis, endometritis, Sigmoid colitis, morbid obesity, functional quadriplegia. Presented on 5/29 with altered mental status. Patient was confused, didn't recognize son. Also found to be gasping for air, ?seizure. 	  HCP: Norris Rush, Son [phone]; Daughter, Heavenly Schmidt [phone]; son asks that only him or his sisterHeavenly be contacted for patient updates/information as they are both proxy's for patient. Medical ethics contacted.       NEURO:  Q4 neuro checks  Cont Keppra and Vimpat for seizures  Sedation fentanyl PRN   Pain management with Tylenol   Possible storming-fentanyl doesn't improve HR  Given previous psych history and workup not revealing any cause, we would like to r/o NMDA encephalitis. Follow up report   Trach and PEG discussed with family and son wants to more time. Case well known to medical ethics, will contact daughter today.   Activity: [] OOB as tolerated [X] Bedrest [X] PT [X] OT [] PMNR      PULM:  Intubated since 5/29  History of COPD was on prednisone at home, now on prednisone 15 mg and  5 mg daily   CPAP as tolerated- did not tolerate 15/5.     CV:  Keep -160  TTE negative for PFO  HTN: continue norvasc and metoprolol    Statin for HLD     RENAL:  Na goal 145-155  IVL   NS 250cc bolus since tachycardic and insensible losses    GI:  Diet: On Glucerna Tube feeds  Last BM 6/4. Has rectal tube. Bowel regimen d/c   Will discuss PEG with family  GI prophylaxis [] not indicated [X] PPI [] other:      ENDO:   Endo Following NPH 40 at 00:00/18:00. NPH 44 06:00/12:00. HISS  Continue synthroid 150 IV. Endocrine following  Continue prednisone 15mg, 5 mg at bedtime (was on it at home for RA)  Goal euglycemia (-180)      HEME/ONC:  SQL  VTE prophylaxis: [X] SCDs [X] chemoprophylaxis [] high risk of DVT/PE on admission due to:  LE doppler 5/30- No DVT    ID:  Tmax 38.2 Recultured 6/3. UA positive. On ceftriaxone. Follow up urine cx  Thus far GNR. Await speciation  Panculture from 5/30 neg so far      MISC: Palliative Care consult called    SOCIAL/FAMILY:  [X] updated family via phone  [] family meeting    CODE STATUS:  [X] Full Code [] DNR [] DNI [] Palliative/Comfort Care    DISPOSITION:  [X] ICU [] Stroke Unit [] Floor [] EMU [] RCU [] PCU    [X] Patient is at high risk of neurologic deterioration/death due to: resp failure    Time spent: _35  [X] critical care minutes

## 2020-06-05 NOTE — PROGRESS NOTE ADULT - ASSESSMENT
65 yo female with T2D uncontrolled while inpatient (HbA1c 7.4%) with no known complications and hypothyroidism here with mental status changes associated with shaking found to have a left frontal parenchymal hematoma with mass effect on the left lateral ventricle and intraventricular extension with blood layering in the occipital horns. Endocrine following for hyperglycemia.        Uncontrolled DM2   FS goal 140-180, above goal   FS q6h  Currently on tube feed Glucerna at goal 65ml/hr  Patient currently on Prednisone  Increase NPH to 44 in the morning and afternoon doses; Decrease NPH to 40units q6pm and 12midnight   Recommend continue mod correctional scale q6h   Discharge on basal/bolus insulin, dose to be determined     Hypothyroidism   5/30 TSH 12.3  6/4 FT4 2.7  Recommend decrease Synthroid to 120 mcg IV qpm     HTN   Will defer management to [facility]SC[facility] team   continue metoprolol and norvasc    HLD   Continue statin 63 yo female with T2D uncontrolled while inpatient (HbA1c 7.4%) with no known complications and hypothyroidism here with mental status changes associated with shaking found to have a left frontal parenchymal hematoma with mass effect on the left lateral ventricle and intraventricular extension with blood layering in the occipital horns. Endocrine following for hyperglycemia.        Uncontrolled DM2   FS goal 140-180, above goal   FS q6h  Currently on tube feed Glucerna at goal 65ml/hr  Patient currently on Prednisone  Recommend NPH 44 Units q 6 hours. However, if patient has FS<150, consider decreasing to 40 Units q 6 hours  Recommend continue mod correctional scale q6h  Hold NPH if TFs are held and notify endocrine team of any change in nutritional status   Discharge on basal/bolus insulin, dose to be determined   Discussed with NS team    Hypothyroidism   5/30 TSH 12.3  6/4 FT4 2.7  Recommend Synthroid 120 mcg IV qpm     HTN   Will defer management to NSCU team   continue metoprolol and norvasc    HLD   Continue statin 63 yo female with T2D uncontrolled while inpatient (HbA1c 7.4%) with no known complications and hypothyroidism here with mental status changes associated with shaking found to have a left frontal parenchymal hematoma with mass effect on the left lateral ventricle and intraventricular extension with blood layering in the occipital horns. Endocrine following for hyperglycemia.        Uncontrolled DM2   FS goal 140-180, above goal   FS q6h  Currently on tube feed Glucerna at goal 65ml/hr  Patient currently on Prednisone 15 + 5 (please notify endo team of any changes)  Recommend NPH 44 Units q 6 hours. However, if patient has FS<150, consider decreasing to 40 Units q 6 hours  Recommend continue mod correctional scale q6h  Hold NPH if TFs are held and notify endocrine team of any change in nutritional status   Discharge on basal/bolus insulin, dose to be determined   Discussed with NS team    Hypothyroidism   5/30 TSH 12.3  6/4 FT4 2.7  Recommend Synthroid 120 mcg IV qpm     HTN   Will defer management to NSCU team   continue metoprolol and norvasc    HLD   Continue statin

## 2020-06-05 NOTE — PROGRESS NOTE ADULT - ATTENDING COMMENTS
Navya Davidson (pager [phone])  On evenings and weekends, please call [phone] or page endocrine fellow on call.   Please note that this patient may be followed by different provider tomorrow. If no answer, contact endocrine fellow on call.

## 2020-06-05 NOTE — PROGRESS NOTE ADULT - SUBJECTIVE AND OBJECTIVE BOX
Chief Complaint:     History:    MEDICATIONS  (STANDING):  amLODIPine   Tablet 10 milliGRAM(s) Oral daily  cefTRIAXone   IVPB      cefTRIAXone   IVPB 1000 milliGRAM(s) IV Intermittent every 24 hours  chlorhexidine 0.12% Liquid 15 milliLiter(s) Oral Mucosa every 12 hours  chlorhexidine 4% Liquid 1 Application(s) Topical <User Schedule>  dextrose 5%. 1000 milliLiter(s) (50 mL/Hr) IV Continuous <Continuous>  dextrose 50% Injectable 12.5 Gram(s) IV Push once  dextrose 50% Injectable 25 Gram(s) IV Push once  dextrose 50% Injectable 25 Gram(s) IV Push once  enoxaparin Injectable 40 milliGRAM(s) SubCutaneous <User Schedule>  insulin lispro (HumaLOG) corrective regimen sliding scale   SubCutaneous every 6 hours  insulin NPH human recombinant 40 Unit(s) SubCutaneous <User Schedule>  insulin NPH human recombinant 46 Unit(s) SubCutaneous <User Schedule>  lacosamide Solution 200 milliGRAM(s) Oral two times a day  levETIRAcetam  Solution 1000 milliGRAM(s) Oral two times a day  levothyroxine Injectable 120 MICROGram(s) IV Push at bedtime  metoprolol tartrate 12.5 milliGRAM(s) Oral two times a day  pantoprazole  Injectable 40 milliGRAM(s) IV Push daily  predniSONE   Tablet 5 milliGRAM(s) Oral at bedtime  predniSONE   Tablet 15 milliGRAM(s) Oral <User Schedule>  simvastatin 20 milliGRAM(s) Oral at bedtime    MEDICATIONS  (PRN):  acetaminophen    Suspension .. 650 milliGRAM(s) Oral every 6 hours PRN Temp greater or equal to 38C (100.4F), Mild Pain (1 - 3)  dextrose 40% Gel 15 Gram(s) Oral once PRN Blood Glucose LESS THAN 70 milliGRAM(s)/deciliter  fentaNYL    Injectable 25 MICROGram(s) IV Push every 2 hours PRN Severe Pain (7 - 10)  glucagon  Injectable 1 milliGRAM(s) IntraMuscular once PRN Glucose LESS THAN 70 milligrams/deciliter  sodium chloride 0.9% lock flush 10 milliLiter(s) IV Push every 1 hour PRN Pre/post blood products, medications, blood draw, and to maintain line patency      Allergies    Depakote (Other)  Seroquel (Other (Severe))    Intolerances      Review of Systems:  Constitutional: No fever  Eyes: No blurry vision  Neuro: No tremors  HEENT: No pain  Cardiovascular: No chest pain, palpitations  Respiratory: No SOB, no cough  GI: No nausea, vomiting, abdominal pain  : No dysuria  Skin: no rash  Psych: no depression  Endocrine: no polyuria, polydipsia  Hem/lymph: no swelling  Osteoporosis: no fractures    ALL OTHER SYSTEMS REVIEWED AND NEGATIVE    UNABLE TO OBTAIN    PHYSICAL EXAM:  VITALS: T(C): 37.7 (06-05-20 @ 07:00)  T(F): 99.9 (06-05-20 @ 07:00), Max: 100.2 (06-04-20 @ 19:00)  HR: 113 (06-05-20 @ 08:00) (109 - 128)  BP: --  RR:  (16 - 29)  SpO2:  (94% - 100%)  Wt(kg): --  GENERAL: NAD, well-groomed, well-developed  EYES: No proptosis, no lid lag, anicteric  HEENT:  Atraumatic, Normocephalic, moist mucous membranes  THYROID: Normal size, no palpable nodules  RESPIRATORY: Clear to auscultation bilaterally; No rales, rhonchi, wheezing, or rubs  CARDIOVASCULAR: Regular rate and rhythm; No murmurs; no peripheral edema  GI: Soft, nontender, non distended, normal bowel sounds  SKIN: Dry, intact, No rashes or lesions  MUSCULOSKELETAL: Full range of motion, normal strength  NEURO: sensation intact, extraocular movements intact, no tremor, normal reflexes  PSYCH: Alert and oriented x 3, normal affect, normal mood  CUSHING'S SIGNS: no striae    POCT Blood Glucose.: 285 mg/dL (06-05-20 @ 05:04)  POCT Blood Glucose.: 247 mg/dL (06-05-20 @ 00:42)  POCT Blood Glucose.: 221 mg/dL (06-05-20 @ 00:11)  POCT Blood Glucose.: 239 mg/dL (06-04-20 @ 17:17)  POCT Blood Glucose.: 247 mg/dL (06-04-20 @ 11:43)  POCT Blood Glucose.: 207 mg/dL (06-04-20 @ 08:09)  POCT Blood Glucose.: 136 mg/dL (06-04-20 @ 04:57)  POCT Blood Glucose.: 93 mg/dL (06-03-20 @ 23:23)  POCT Blood Glucose.: 88 mg/dL (06-03-20 @ 23:18)  POCT Blood Glucose.: 197 mg/dL (06-03-20 @ 17:10)  POCT Blood Glucose.: 228 mg/dL (06-03-20 @ 12:39)  POCT Blood Glucose.: 207 mg/dL (06-03-20 @ 05:15)  POCT Blood Glucose.: 173 mg/dL (06-03-20 @ 00:30)  POCT Blood Glucose.: 139 mg/dL (06-02-20 @ 21:35)  POCT Blood Glucose.: 179 mg/dL (06-02-20 @ 17:29)  POCT Blood Glucose.: 264 mg/dL (06-02-20 @ 14:04)  POCT Blood Glucose.: 259 mg/dL (06-02-20 @ 10:41)      06-04    142  |  105  |  18  ----------------------------<  217<H>  4.3   |  22  |  0.67    EGFR if : 109  EGFR if non : 94    Ca    10.0      06-04  Mg     2.1     06-04  Phos  4.4     06-04            Thyroid Function Tests:  06-04 @ 01:58 TSH -- FreeT4 2.7 T3 -- Anti TPO -- Anti Thyroglobulin Ab -- TSI --  05-30 @ 04:44 TSH 12.30 FreeT4 -- T3 51 Anti TPO -- Anti Thyroglobulin Ab -- TSI -- Chief Complaint: T2DM    History: No acute event. Patient is on 24 TFs (Glucerna) which she is tolerating.     MEDICATIONS  (STANDING):  amLODIPine   Tablet 10 milliGRAM(s) Oral daily  cefTRIAXone   IVPB      cefTRIAXone   IVPB 1000 milliGRAM(s) IV Intermittent every 24 hours  chlorhexidine 0.12% Liquid 15 milliLiter(s) Oral Mucosa every 12 hours  chlorhexidine 4% Liquid 1 Application(s) Topical <User Schedule>  dextrose 5%. 1000 milliLiter(s) (50 mL/Hr) IV Continuous <Continuous>  dextrose 50% Injectable 12.5 Gram(s) IV Push once  dextrose 50% Injectable 25 Gram(s) IV Push once  dextrose 50% Injectable 25 Gram(s) IV Push once  enoxaparin Injectable 40 milliGRAM(s) SubCutaneous <User Schedule>  insulin lispro (HumaLOG) corrective regimen sliding scale   SubCutaneous every 6 hours  insulin NPH human recombinant 40 Unit(s) SubCutaneous <User Schedule>  insulin NPH human recombinant 46 Unit(s) SubCutaneous <User Schedule>  lacosamide Solution 200 milliGRAM(s) Oral two times a day  levETIRAcetam  Solution 1000 milliGRAM(s) Oral two times a day  levothyroxine Injectable 120 MICROGram(s) IV Push at bedtime  metoprolol tartrate 12.5 milliGRAM(s) Oral two times a day  pantoprazole  Injectable 40 milliGRAM(s) IV Push daily  predniSONE   Tablet 5 milliGRAM(s) Oral at bedtime  predniSONE   Tablet 15 milliGRAM(s) Oral <User Schedule>  simvastatin 20 milliGRAM(s) Oral at bedtime    MEDICATIONS  (PRN):  acetaminophen    Suspension .. 650 milliGRAM(s) Oral every 6 hours PRN Temp greater or equal to 38C (100.4F), Mild Pain (1 - 3)  dextrose 40% Gel 15 Gram(s) Oral once PRN Blood Glucose LESS THAN 70 milliGRAM(s)/deciliter  fentaNYL    Injectable 25 MICROGram(s) IV Push every 2 hours PRN Severe Pain (7 - 10)  glucagon  Injectable 1 milliGRAM(s) IntraMuscular once PRN Glucose LESS THAN 70 milligrams/deciliter  sodium chloride 0.9% lock flush 10 milliLiter(s) IV Push every 1 hour PRN Pre/post blood products, medications, blood draw, and to maintain line patency      Allergies    Depakote (Other)  Seroquel (Other (Severe))    Intolerances      Review of Systems:  Constitutional: No fever  Eyes: No blurry vision  Neuro: No tremors  HEENT: No pain  Cardiovascular: No chest pain, palpitations  Respiratory: No SOB, no cough  GI: No nausea, vomiting, abdominal pain  : No dysuria  Skin: no rash  Psych: no depression  Endocrine: no polyuria, polydipsia      ALL OTHER SYSTEMS REVIEWED AND NEGATIVE        PHYSICAL EXAM:  VITALS: T(C): 37.7 (06-05-20 @ 07:00)  T(F): 99.9 (06-05-20 @ 07:00), Max: 100.2 (06-04-20 @ 19:00)  HR: 113 (06-05-20 @ 08:00) (109 - 128)  BP: --  RR:  (16 - 29)  SpO2:  (94% - 100%)  Wt(kg): --  GENERAL: NAD, well-groomed, well-developed  EYES: No proptosis, no lid lag, anicteric  HEENT:  Atraumatic, Normocephalic, moist mucous membranes  RESPIRATORY: Clear to auscultation bilaterally; No rales, rhonchi, wheezing, or rubs  CARDIOVASCULAR: Regular rate and rhythm; No murmurs  GI: Soft, nontender, non distended, normal bowel sounds        POCT Blood Glucose.: 285 mg/dL (06-05-20 @ 05:04)  POCT Blood Glucose.: 247 mg/dL (06-05-20 @ 00:42)  POCT Blood Glucose.: 221 mg/dL (06-05-20 @ 00:11)  POCT Blood Glucose.: 239 mg/dL (06-04-20 @ 17:17)  POCT Blood Glucose.: 247 mg/dL (06-04-20 @ 11:43)  POCT Blood Glucose.: 207 mg/dL (06-04-20 @ 08:09)  POCT Blood Glucose.: 136 mg/dL (06-04-20 @ 04:57)  POCT Blood Glucose.: 93 mg/dL (06-03-20 @ 23:23)  POCT Blood Glucose.: 88 mg/dL (06-03-20 @ 23:18)  POCT Blood Glucose.: 197 mg/dL (06-03-20 @ 17:10)  POCT Blood Glucose.: 228 mg/dL (06-03-20 @ 12:39)  POCT Blood Glucose.: 207 mg/dL (06-03-20 @ 05:15)  POCT Blood Glucose.: 173 mg/dL (06-03-20 @ 00:30)  POCT Blood Glucose.: 139 mg/dL (06-02-20 @ 21:35)  POCT Blood Glucose.: 179 mg/dL (06-02-20 @ 17:29)  POCT Blood Glucose.: 264 mg/dL (06-02-20 @ 14:04)  POCT Blood Glucose.: 259 mg/dL (06-02-20 @ 10:41)      06-04    142  |  105  |  18  ----------------------------<  217<H>  4.3   |  22  |  0.67    EGFR if : 109  EGFR if non : 94    Ca    10.0      06-04  Mg     2.1     06-04  Phos  4.4     06-04            Thyroid Function Tests:  06-04 @ 01:58 TSH -- FreeT4 2.7 T3 -- Anti TPO -- Anti Thyroglobulin Ab -- TSI --  05-30 @ 04:44 TSH 12.30 FreeT4 -- T3 51 Anti TPO -- Anti Thyroglobulin Ab -- TSI --

## 2020-06-06 LAB
-  AMIKACIN: SIGNIFICANT CHANGE UP
-  AMPICILLIN/SULBACTAM: SIGNIFICANT CHANGE UP
-  AMPICILLIN: SIGNIFICANT CHANGE UP
-  AZTREONAM: SIGNIFICANT CHANGE UP
-  CEFAZOLIN: SIGNIFICANT CHANGE UP
-  CEFEPIME: SIGNIFICANT CHANGE UP
-  CEFOXITIN: SIGNIFICANT CHANGE UP
-  CEFTRIAXONE: SIGNIFICANT CHANGE UP
-  CIPROFLOXACIN: SIGNIFICANT CHANGE UP
-  ERTAPENEM: SIGNIFICANT CHANGE UP
-  GENTAMICIN: SIGNIFICANT CHANGE UP
-  IMIPENEM: SIGNIFICANT CHANGE UP
-  LEVOFLOXACIN: SIGNIFICANT CHANGE UP
-  MEROPENEM: SIGNIFICANT CHANGE UP
-  NITROFURANTOIN: SIGNIFICANT CHANGE UP
-  PIPERACILLIN/TAZOBACTAM: SIGNIFICANT CHANGE UP
-  TIGECYCLINE: SIGNIFICANT CHANGE UP
-  TOBRAMYCIN: SIGNIFICANT CHANGE UP
-  TRIMETHOPRIM/SULFAMETHOXAZOLE: SIGNIFICANT CHANGE UP
ANION GAP SERPL CALC-SCNC: 16 MMOL/L — SIGNIFICANT CHANGE UP (ref 5–17)
BLD GP AB SCN SERPL QL: NEGATIVE — SIGNIFICANT CHANGE UP
BUN SERPL-MCNC: 19 MG/DL — SIGNIFICANT CHANGE UP (ref 7–23)
CALCIUM SERPL-MCNC: 9.2 MG/DL — SIGNIFICANT CHANGE UP (ref 8.4–10.5)
CHLORIDE SERPL-SCNC: 103 MMOL/L — SIGNIFICANT CHANGE UP (ref 96–108)
CO2 SERPL-SCNC: 20 MMOL/L — LOW (ref 22–31)
CREAT SERPL-MCNC: 0.58 MG/DL — SIGNIFICANT CHANGE UP (ref 0.5–1.3)
CULTURE RESULTS: SIGNIFICANT CHANGE UP
GLUCOSE BLDC GLUCOMTR-MCNC: 174 MG/DL — HIGH (ref 70–99)
GLUCOSE BLDC GLUCOMTR-MCNC: 229 MG/DL — HIGH (ref 70–99)
GLUCOSE BLDC GLUCOMTR-MCNC: 288 MG/DL — HIGH (ref 70–99)
GLUCOSE SERPL-MCNC: 245 MG/DL — HIGH (ref 70–99)
HCT VFR BLD CALC: 22.8 % — LOW (ref 34.5–45)
HCT VFR BLD CALC: 28.8 % — LOW (ref 34.5–45)
HGB BLD-MCNC: 6.8 G/DL — CRITICAL LOW (ref 11.5–15.5)
HGB BLD-MCNC: 9.2 G/DL — LOW (ref 11.5–15.5)
LMWH PPP CHRO-ACNC: 0.22 IU/ML — LOW (ref 0.5–1.1)
MAGNESIUM SERPL-MCNC: 1.9 MG/DL — SIGNIFICANT CHANGE UP (ref 1.6–2.6)
MCHC RBC-ENTMCNC: 28.7 PG — SIGNIFICANT CHANGE UP (ref 27–34)
MCHC RBC-ENTMCNC: 29.2 PG — SIGNIFICANT CHANGE UP (ref 27–34)
MCHC RBC-ENTMCNC: 29.8 GM/DL — LOW (ref 32–36)
MCHC RBC-ENTMCNC: 31.9 GM/DL — LOW (ref 32–36)
MCV RBC AUTO: 91.4 FL — SIGNIFICANT CHANGE UP (ref 80–100)
MCV RBC AUTO: 96.2 FL — SIGNIFICANT CHANGE UP (ref 80–100)
METHOD TYPE: SIGNIFICANT CHANGE UP
NRBC # BLD: 0 /100 WBCS — SIGNIFICANT CHANGE UP (ref 0–0)
NRBC # BLD: 0 /100 WBCS — SIGNIFICANT CHANGE UP (ref 0–0)
ORGANISM # SPEC MICROSCOPIC CNT: SIGNIFICANT CHANGE UP
ORGANISM # SPEC MICROSCOPIC CNT: SIGNIFICANT CHANGE UP
PHOSPHATE SERPL-MCNC: 3.3 MG/DL — SIGNIFICANT CHANGE UP (ref 2.5–4.5)
PLATELET # BLD AUTO: 451 K/UL — HIGH (ref 150–400)
PLATELET # BLD AUTO: 465 K/UL — HIGH (ref 150–400)
POTASSIUM SERPL-MCNC: 4.3 MMOL/L — SIGNIFICANT CHANGE UP (ref 3.5–5.3)
POTASSIUM SERPL-SCNC: 4.3 MMOL/L — SIGNIFICANT CHANGE UP (ref 3.5–5.3)
RBC # BLD: 2.37 M/UL — LOW (ref 3.8–5.2)
RBC # BLD: 3.15 M/UL — LOW (ref 3.8–5.2)
RBC # FLD: 20.6 % — HIGH (ref 10.3–14.5)
RBC # FLD: 23 % — HIGH (ref 10.3–14.5)
RH IG SCN BLD-IMP: POSITIVE — SIGNIFICANT CHANGE UP
SODIUM SERPL-SCNC: 139 MMOL/L — SIGNIFICANT CHANGE UP (ref 135–145)
SPECIMEN SOURCE: SIGNIFICANT CHANGE UP
WBC # BLD: 12.89 K/UL — HIGH (ref 3.8–10.5)
WBC # BLD: 13.28 K/UL — HIGH (ref 3.8–10.5)
WBC # FLD AUTO: 12.89 K/UL — HIGH (ref 3.8–10.5)
WBC # FLD AUTO: 13.28 K/UL — HIGH (ref 3.8–10.5)

## 2020-06-06 PROCEDURE — 71045 X-RAY EXAM CHEST 1 VIEW: CPT | Mod: 26

## 2020-06-06 PROCEDURE — 99291 CRITICAL CARE FIRST HOUR: CPT

## 2020-06-06 PROCEDURE — 99292 CRITICAL CARE ADDL 30 MIN: CPT

## 2020-06-06 RX ORDER — HUMAN INSULIN 100 [IU]/ML
40 INJECTION, SUSPENSION SUBCUTANEOUS
Refills: 0 | Status: DISCONTINUED | OUTPATIENT
Start: 2020-06-06 | End: 2020-06-07

## 2020-06-06 RX ORDER — HUMAN INSULIN 100 [IU]/ML
44 INJECTION, SUSPENSION SUBCUTANEOUS
Refills: 0 | Status: DISCONTINUED | OUTPATIENT
Start: 2020-06-06 | End: 2020-06-07

## 2020-06-06 RX ORDER — MAGNESIUM SULFATE 500 MG/ML
2 VIAL (ML) INJECTION ONCE
Refills: 0 | Status: COMPLETED | OUTPATIENT
Start: 2020-06-06 | End: 2020-06-06

## 2020-06-06 RX ORDER — MAGNESIUM SULFATE 500 MG/ML
2 VIAL (ML) INJECTION ONCE
Refills: 0 | Status: DISCONTINUED | OUTPATIENT
Start: 2020-06-06 | End: 2020-06-06

## 2020-06-06 RX ORDER — MEROPENEM 1 G/30ML
1000 INJECTION INTRAVENOUS ONCE
Refills: 0 | Status: COMPLETED | OUTPATIENT
Start: 2020-06-06 | End: 2020-06-06

## 2020-06-06 RX ORDER — METOPROLOL TARTRATE 50 MG
25 TABLET ORAL
Refills: 0 | Status: DISCONTINUED | OUTPATIENT
Start: 2020-06-06 | End: 2020-06-07

## 2020-06-06 RX ORDER — MEROPENEM 1 G/30ML
1000 INJECTION INTRAVENOUS EVERY 8 HOURS
Refills: 0 | Status: DISCONTINUED | OUTPATIENT
Start: 2020-06-07 | End: 2020-06-08

## 2020-06-06 RX ORDER — MEROPENEM 1 G/30ML
INJECTION INTRAVENOUS
Refills: 0 | Status: DISCONTINUED | OUTPATIENT
Start: 2020-06-06 | End: 2020-06-08

## 2020-06-06 RX ADMIN — FENTANYL CITRATE 25 MICROGRAM(S): 50 INJECTION INTRAVENOUS at 17:00

## 2020-06-06 RX ADMIN — FENTANYL CITRATE 25 MICROGRAM(S): 50 INJECTION INTRAVENOUS at 20:00

## 2020-06-06 RX ADMIN — Medication 15 MILLIGRAM(S): at 05:48

## 2020-06-06 RX ADMIN — CHLORHEXIDINE GLUCONATE 15 MILLILITER(S): 213 SOLUTION TOPICAL at 17:06

## 2020-06-06 RX ADMIN — SIMVASTATIN 20 MILLIGRAM(S): 20 TABLET, FILM COATED ORAL at 22:55

## 2020-06-06 RX ADMIN — MEROPENEM 100 MILLIGRAM(S): 1 INJECTION INTRAVENOUS at 18:57

## 2020-06-06 RX ADMIN — Medication 54 GRAM(S): at 16:59

## 2020-06-06 RX ADMIN — HUMAN INSULIN 40 UNIT(S): 100 INJECTION, SUSPENSION SUBCUTANEOUS at 11:49

## 2020-06-06 RX ADMIN — FENTANYL CITRATE 25 MICROGRAM(S): 50 INJECTION INTRAVENOUS at 23:00

## 2020-06-06 RX ADMIN — HUMAN INSULIN 40 UNIT(S): 100 INJECTION, SUSPENSION SUBCUTANEOUS at 05:48

## 2020-06-06 RX ADMIN — Medication 5 MILLIGRAM(S): at 22:55

## 2020-06-06 RX ADMIN — LACOSAMIDE 200 MILLIGRAM(S): 50 TABLET ORAL at 05:47

## 2020-06-06 RX ADMIN — Medication 4: at 05:49

## 2020-06-06 RX ADMIN — Medication 120 MICROGRAM(S): at 22:55

## 2020-06-06 RX ADMIN — Medication 6: at 11:48

## 2020-06-06 RX ADMIN — LEVETIRACETAM 1000 MILLIGRAM(S): 250 TABLET, FILM COATED ORAL at 17:06

## 2020-06-06 RX ADMIN — HUMAN INSULIN 40 UNIT(S): 100 INJECTION, SUSPENSION SUBCUTANEOUS at 17:07

## 2020-06-06 RX ADMIN — FENTANYL CITRATE 25 MICROGRAM(S): 50 INJECTION INTRAVENOUS at 11:47

## 2020-06-06 RX ADMIN — LACOSAMIDE 200 MILLIGRAM(S): 50 TABLET ORAL at 17:06

## 2020-06-06 RX ADMIN — Medication 2: at 17:07

## 2020-06-06 RX ADMIN — Medication 25 MILLIGRAM(S): at 17:06

## 2020-06-06 RX ADMIN — LEVETIRACETAM 1000 MILLIGRAM(S): 250 TABLET, FILM COATED ORAL at 05:47

## 2020-06-06 RX ADMIN — ENOXAPARIN SODIUM 30 MILLIGRAM(S): 100 INJECTION SUBCUTANEOUS at 05:47

## 2020-06-06 RX ADMIN — PANTOPRAZOLE SODIUM 40 MILLIGRAM(S): 20 TABLET, DELAYED RELEASE ORAL at 11:48

## 2020-06-06 RX ADMIN — AMLODIPINE BESYLATE 10 MILLIGRAM(S): 2.5 TABLET ORAL at 05:48

## 2020-06-06 RX ADMIN — Medication 12.5 MILLIGRAM(S): at 05:48

## 2020-06-06 RX ADMIN — CHLORHEXIDINE GLUCONATE 15 MILLILITER(S): 213 SOLUTION TOPICAL at 05:47

## 2020-06-06 RX ADMIN — CEFTRIAXONE 100 MILLIGRAM(S): 500 INJECTION, POWDER, FOR SOLUTION INTRAMUSCULAR; INTRAVENOUS at 16:58

## 2020-06-06 RX ADMIN — ENOXAPARIN SODIUM 30 MILLIGRAM(S): 100 INJECTION SUBCUTANEOUS at 17:06

## 2020-06-06 RX ADMIN — CHLORHEXIDINE GLUCONATE 1 APPLICATION(S): 213 SOLUTION TOPICAL at 22:56

## 2020-06-06 NOTE — PROGRESS NOTE ADULT - ASSESSMENT
ASSESSMENT:  64 year old female with PMHx of Hypothyroidism, HTN, DM, COPD/CHRIS, RA (Prednisone), SBO s/p ex lap with lysis of adhesions c/b evisceration, recent hospitalization in 3/2020 with encephalopathy, significantly hypothyroid (, without myxedema coma), small (stable) right sided SDH, UTI, catatonic reaction to VPA (now resolved) and DKA and psychiatric issues, 5/2020 hospitalization for GIB, Emphysematous cystitis, endometritis, Sigmoid colitis, morbid obesity, functional quadriplegia. Presented on 5/29 with altered mental status. Patient was confused, didn't recognize son. Also found to be gasping for air, ?seizure. 	  HCP: Norris Rush, Son [phone]; Daughter, Heavenly Schmidt [phone]; son asks that only him or his sisterHeavenly be contacted for patient updates/information as they are both proxy's for patient. Medical ethics contacted.       NEURO:  Q4 neuro checks  Cont Keppra and Vimpat for seizures  Sedation fentanyl PRN   Pain management with Tylenol.   Given previous psych history and workup not revealing any cause, we would like to r/o NMDA encephalitis. Follow up report   Trach and PEG discussed with family and son wants to more time. Case well known to medical ethics; contacted daughter- has not responded.   Activity: [] OOB as tolerated [X] Bedrest [X] PT [X] OT [] PMNR      PULM:  Intubated since 5/29  History of COPD was on prednisone at home, now on prednisone 15 mg and  5 mg daily   CPAP as tolerated- did not tolerate 15/5.     CV:  Keep -160  TTE negative for PFO  HTN: continue norvasc and metoprolol    Statin for HLD     RENAL:  Na goal 145-155  IVL   Positive 860cc since 6/5 due to small fluid boluses      GI:  Diet: On Glucerna Tube feeds  Last BM 6/4. Has rectal tube. Bowel regimen d/michael.   Monitor for cdiff  Will discuss PEG with family  GI prophylaxis [] not indicated [X] PPI [] other:      ENDO:   Endo Following NPH 40Q 6  Continue synthroid 150 IV. Endocrine following.   Continue prednisone 15mg, 5 mg at bedtime (was on it at home for RA)  Goal euglycemia (-180)      HEME/ONC:  SQL. Xa levels.   VTE prophylaxis: [X] SCDs [X] chemoprophylaxis [] high risk of DVT/PE on admission due to:  LE doppler 5/30- No DVT    ID:  Tmax 38.2 Recultured 6/3. UA positive. On ceftriaxone. Follow up urine cx  Thus far GNR. Await speciation.   Panculture from 5/30 neg so far      MISC: Palliative Care consulted   Have been in contact with ana maría Pisano on multiple occasions  Ethics called      SOCIAL/FAMILY:  [X] updated family via phone  [] family meeting    CODE STATUS:  [X] Full Code [] DNR [] DNI [] Palliative/Comfort Care    DISPOSITION:  [X] ICU [] Stroke Unit [] Floor [] EMU [] RCU [] PCU    [X] Patient is at high risk of neurologic deterioration/death due to: resp failure    Time spent: _35  [X] critical care minutes ASSESSMENT:  64 year old female with PMHx of Hypothyroidism, HTN, DM, COPD/CHRIS, RA (Prednisone), SBO s/p ex lap with lysis of adhesions c/b evisceration, recent hospitalization in 3/2020 with encephalopathy, significantly hypothyroid (, without myxedema coma), small (stable) right sided SDH, UTI, catatonic reaction to VPA (now resolved) and DKA and psychiatric issues, 5/2020 hospitalization for GIB, Emphysematous cystitis, endometritis, Sigmoid colitis, morbid obesity, functional quadriplegia. Presented on 5/29 with altered mental status. Patient was confused, didn't recognize son. Also found to be gasping for air, ?seizure. 	  HCP: Norris Rush, Son [phone]; Daughter, Heavenly Schmidt [phone]; son asks that only him or his sisterHeavenly be contacted for patient updates/information as they are both proxy's for patient. Medical ethics contacted.       NEURO:  Q4 neurochecks  Cont Keppra and Vimpat for seizures  Sedation fentanyl PRN   Pain management with Tylenol.   Given previous psych history and workup not revealing any cause, r/o NMDA encephalitis. Follow up report   Trach and PEG discussed with family and son wants to more time. Case well known to medical ethics; contacted daughter- has not responded.   Activity: [] OOB as tolerated [X] Bedrest [X] PT [X] OT [] PMNR      PULM:  Intubated since 5/29  History of COPD was on prednisone at home, now on prednisone 15 mg and  5 mg daily   CPAP as tolerated- did not tolerate 15/5.     CV:  Keep -160  TTE negative for PFO  HTN: continue norvasc and metoprolol    Statin for HLD     RENAL:  Na wnl  IVL   Positive 860cc since 6/5 due to small fluid boluses      GI:  Diet: On Glucerna Tube feeds  Last BM 6/4. Has rectal tube. Bowel regimen d/michael.   Monitor for cdiff  Will need to discuss PEG with family  GI prophylaxis [] not indicated [X] PPI [] other:      ENDO:   Endo Following NPH 40Q 6  Continue synthroid. Endocrine following.   Continue prednisone 15mg, 5 mg at bedtime (was on it at home for RA)  Goal euglycemia (-180)      HEME/ONC:  On SQL. Xa levels.   VTE prophylaxis: [X] SCDs [X] chemoprophylaxis [] high risk of DVT/PE on admission due to:  LE doppler 5/30- No DVT    ID:  Tmax 38.2 Recultured 6/3. UA positive. On ceftriaxone. Follow up urine cx  Thus far GNR. Await speciation.   Panculture from 5/30 neg so far      [facility]   Personally have been in contact with son, Norris. He asked for team to defer on asking him about decisions regarding goals of care until Monday 6/8.   Ethics called. Informed team that daughter Heavenly also makes decisions  Called Heavenly on 6/5. Attempted to speak with her but she hung up during conversation. Have not been able to reach her since then.  Will attempt again on 6/6.     SOCIAL/FAMILY:  [X] updated family via phone  [] family meeting    CODE STATUS:  [X] Full Code [] DNR [] DNI [] Palliative/Comfort Care    DISPOSITION:  [X] ICU [] Stroke Unit [] Floor [] EMU [] RCU [] PCU    [X] Patient is at high risk of neurologic deterioration/death due to: resp failure, seizures, hemorrhage.     Time spent: _35  [X] critical care minutes ASSESSMENT:  64 year old female with PMHx of Hypothyroidism, HTN, DM, COPD/CHRIS, RA (Prednisone), SBO s/p ex lap with lysis of adhesions c/b evisceration, recent hospitalization in 3/2020 with encephalopathy, significantly hypothyroid (, without myxedema coma), small (stable) right sided SDH, UTI, catatonic reaction to VPA (now resolved) and DKA and psychiatric issues, 5/2020 hospitalization for GIB, Emphysematous cystitis, endometritis, Sigmoid colitis, morbid obesity, functional quadriplegia. Presented on 5/29 with altered mental status. Patient was confused, didn't recognize son. Also found to be gasping for air, ?seizure. 	  HCP: Norris Rush, Son [phone]; Daughter, Heavenly Schmidt [phone]; son asks that only him or his sisterHeavenly be contacted for patient updates/information as they are both proxy's for patient. Medical ethics contacted.       NEURO:  Q4 neurochecks  Cont Keppra and Vimpat for seizures  Sedation fentanyl PRN   Pain management with Tylenol.   Given previous psych history and workup not revealing any cause, r/o NMDA encephalitis. Follow up report   Trach and PEG discussed with family and son wants to more time. Case well known to medical ethics; contacted daughter- has not responded.   Activity: [] OOB as tolerated [X] Bedrest [X] PT [X] OT [] PMNR      PULM:  Intubated since 5/29  History of COPD was on prednisone at home, now on prednisone 15 mg and  5 mg daily   CPAP as tolerated- did not tolerate 15/5.     CV:  Keep -160  TTE negative for PFO  HTN: continue norvasc and metoprolol    Statin for HLD     RENAL:  Na wnl  IVL   Positive 860cc since 6/5 due to small fluid boluses      GI:  Diet: On Glucerna Tube feeds  Last BM 6/4. Has rectal tube. Bowel regimen d/michael.   Monitor for cdiff  Will need to discuss PEG with family  GI prophylaxis [] not indicated [X] PPI [] other:      ENDO:   Endo Following NPH 40Q 6  Continue synthroid. Endocrine following.   Continue prednisone 15mg, 5 mg at bedtime (was on it at home for RA)  Goal euglycemia (-180)      HEME/ONC:  On SQL. Xa levels.   VTE prophylaxis: [X] SCDs [X] chemoprophylaxis [] high risk of DVT/PE on admission due to:  LE doppler 5/30- No DVT  Anemia- likely 2/2 hemodilution and bid venipuncture.  Transfuse 1 unit PRBCs. Monitor.   No dark tarry stool    ID:  Tmax 38.2 Recultured 6/3. UA positive. On ceftriaxone. Follow up urine cx  Thus far GNR. Await speciation.   Panculture from 5/30 neg so far      MISC: Palliative Care consulted   Personally have been in contact with son, Norris. He asked for team to defer on asking him about decisions regarding goals of care until Monday 6/8.   Ethics called. Informed team that daughter Heavenly also makes decisions  Called Heavenly on 6/5. Attempted to speak with her but she hung up during conversation. Have not been able to reach her since then.  Will attempt again on 6/6.     SOCIAL/FAMILY:  [X] updated family via phone  [] family meeting    CODE STATUS:  [X] Full Code [] DNR [] DNI [] Palliative/Comfort Care    DISPOSITION:  [X] ICU [] Stroke Unit [] Floor [] EMU [] RCU [] PCU    [X] Patient is at high risk of neurologic deterioration/death due to: resp failure, seizures, hemorrhage.     Time spent: _35  [X] critical care minutes ASSESSMENT:  64 year old female with PMHx of Hypothyroidism, HTN, DM, COPD/CHRIS, RA (Prednisone), SBO s/p ex lap with lysis of adhesions c/b evisceration, recent hospitalization in 3/2020 with encephalopathy, significantly hypothyroid (, without myxedema coma), small (stable) right sided SDH, UTI, catatonic reaction to VPA (now resolved) and DKA and psychiatric issues, 5/2020 hospitalization for GIB, Emphysematous cystitis, endometritis, Sigmoid colitis, morbid obesity, functional quadriplegia. Presented on 5/29 with altered mental status. Patient was confused, didn't recognize son. Also found to be gasping for air, ?seizure. 	  HCP: Norris Rush, Son [phone]; Daughter, Heavenly Schmidt [phone]; son asks that only him or his sisterHeavenly be contacted for patient updates/information as they are both proxy's for patient. Medical ethics contacted.       NEURO:  Q4 neurochecks  Cont Keppra and Vimpat for seizures  Sedation fentanyl PRN   Pain management with Tylenol.   Given previous psych history and workup not revealing any cause, r/o NMDA encephalitis. Follow up report   Trach and PEG discussed with family and son wants to more time. Case well known to medical ethics; contacted daughter- has not responded.   Activity: [] OOB as tolerated [X] Bedrest [X] PT [X] OT [] PMNR      PULM:  Intubated since 5/29  History of COPD was on prednisone at home, now on prednisone 15 mg and  5 mg daily   CPAP as tolerated- did not tolerate 15/5.     CV:  Keep -160  TTE negative for PFO  HTN: continue norvasc and metoprolol    Statin for HLD     RENAL:  Na wnl  IVL   Positive 860cc since 6/5 due to small fluid boluses      GI:  Diet: On Glucerna Tube feeds  Last BM 6/4. Has rectal tube. Bowel regimen d/michael.   Monitor for cdiff  Will need to discuss PEG with family  GI prophylaxis [] not indicated [X] PPI [] other:      ENDO:   Endo Following NPH 40Q 6  Continue synthroid. Endocrine following.   Continue prednisone 15mg, 5 mg at bedtime (was on it at home for RA)  Goal euglycemia (-180)      HEME/ONC:  On SQL. Xa levels.   VTE prophylaxis: [X] SCDs [X] chemoprophylaxis [] high risk of DVT/PE on admission due to:  LE doppler 5/30- No DVT  Anemia- likely 2/2 hemodilution and bid venipuncture.  Transfuse 1 unit PRBCs. Monitor.   No dark tarry stool    ID:  Tmax 38.2 Recultured 6/3. UA positive. On ceftriaxone. Follow up urine cx  Thus far GNR. Await speciation.   Panculture from 5/30 neg so far      MISC: Palliative Care consulted   Personally have been in contact with son, Norris. He asked for team to defer on asking him about decisions regarding goals of care until Monday 6/8.   Ethics called. Informed team that daughter Heavenly also makes decisions  Called Heavenly on 6/5. Attempted to speak with her but she hung up during conversation. Have not been able to reach her since then.  Will attempt again on 6/6.     SOCIAL/FAMILY:  [X] updated family via phone  [] family meeting    CODE STATUS:  [X] Full Code [] DNR [] DNI [] Palliative/Comfort Care    DISPOSITION:  [X] ICU [] Stroke Unit [] Floor [] EMU [] RCU [] PCU    [X] Patient is at high risk of neurologic deterioration/death due to: resp failure, seizures, hemorrhage.     Time spent: _45  [X] critical care minutes

## 2020-06-06 NOTE — PROGRESS NOTE ADULT - ASSESSMENT
ASSESSMENT/PLAN:  Left ICH with IVH and SAH; neurological prognosis for recovery poor  Family mtg in progress to discuss GOC; will need trach/PEG pending family discussions  Urine positive for ESBL-will change abx to meropenenm    [x] Patient is at high risk of neurologic deterioration/death due to: cerebral edema, ICH, brain compression    Time seen:  Time spent: _45__ [x] critical care minutes

## 2020-06-06 NOTE — PROGRESS NOTE ADULT - SUBJECTIVE AND OBJECTIVE BOX
SUMMARY:    Daytime Events: family meeting scheduled for Monday.     T(C): 37.1 (06-06-20 @ 19:00), Max: 37.7 (06-05-20 @ 23:00)  HR: 109 (06-06-20 @ 22:00) (97 - 120)  BP: 139/76 (06-06-20 @ 19:00) (124/78 - 150/85)  RR: 23 (06-06-20 @ 22:00) (17 - 26)  SpO2: 100% (06-06-20 @ 22:00) (98% - 100%)  Wt(kg): --    Physical Exam:  Intubated; no eye opening   right gaze preference; pupils 3mm b/l and reactive; not FC; LUE w/d; RUE plegic    IMAGING/DATA: [] Reviewed    acetaminophen    Suspension .. 650 milliGRAM(s) Oral every 6 hours PRN  amLODIPine   Tablet 10 milliGRAM(s) Oral daily  chlorhexidine 0.12% Liquid 15 milliLiter(s) Oral Mucosa every 12 hours  chlorhexidine 4% Liquid 1 Application(s) Topical <User Schedule>  dextrose 40% Gel 15 Gram(s) Oral once PRN  dextrose 5%. 1000 milliLiter(s) IV Continuous <Continuous>  dextrose 50% Injectable 12.5 Gram(s) IV Push once  dextrose 50% Injectable 25 Gram(s) IV Push once  dextrose 50% Injectable 25 Gram(s) IV Push once  enoxaparin Injectable 30 milliGRAM(s) SubCutaneous two times a day  fentaNYL    Injectable 25 MICROGram(s) IV Push every 2 hours PRN  glucagon  Injectable 1 milliGRAM(s) IntraMuscular once PRN  insulin lispro (HumaLOG) corrective regimen sliding scale   SubCutaneous every 6 hours  insulin NPH human recombinant 40 Unit(s) SubCutaneous <User Schedule>  insulin NPH human recombinant 44 Unit(s) SubCutaneous <User Schedule>  lacosamide Solution 200 milliGRAM(s) Oral two times a day  levETIRAcetam  Solution 1000 milliGRAM(s) Oral two times a day  levothyroxine Injectable 120 MICROGram(s) IV Push at bedtime  meropenem  IVPB      metoprolol tartrate 25 milliGRAM(s) Oral two times a day  pantoprazole  Injectable 40 milliGRAM(s) IV Push daily  predniSONE   Tablet 5 milliGRAM(s) Oral at bedtime  predniSONE   Tablet 15 milliGRAM(s) Oral <User Schedule>  simvastatin 20 milliGRAM(s) Oral at bedtime  sodium chloride 0.9% lock flush 10 milliLiter(s) IV Push every 1 hour PRN                            9.2    12.89 )-----------( 465      ( 06 Jun 2020 16:50 )             28.8     06-06    139  |  103  |  19  ----------------------------<  245<H>  4.3   |  20<L>  |  0.58    Ca    9.2      06 Jun 2020 06:00  Phos  3.3     06-06  Mg     1.9     06-06        DEVICES:   [] Restraints [] ET tube [] central line [] arterial line [] iraheta [] NGT/OGT [] EVD [] LD [] MABEL/HMV [] Trach [] PEG [] Chest Tube [] other:

## 2020-06-06 NOTE — PROGRESS NOTE ADULT - SUBJECTIVE AND OBJECTIVE BOX
Chief Complaint: T2DM     History: Currently on continuous TF     MEDICATIONS  (STANDING):  amLODIPine   Tablet 10 milliGRAM(s) Oral daily  cefTRIAXone   IVPB      cefTRIAXone   IVPB 1000 milliGRAM(s) IV Intermittent every 24 hours  chlorhexidine 0.12% Liquid 15 milliLiter(s) Oral Mucosa every 12 hours  chlorhexidine 4% Liquid 1 Application(s) Topical <User Schedule>  dextrose 5%. 1000 milliLiter(s) (50 mL/Hr) IV Continuous <Continuous>  dextrose 50% Injectable 12.5 Gram(s) IV Push once  dextrose 50% Injectable 25 Gram(s) IV Push once  dextrose 50% Injectable 25 Gram(s) IV Push once  enoxaparin Injectable 30 milliGRAM(s) SubCutaneous two times a day  insulin lispro (HumaLOG) corrective regimen sliding scale   SubCutaneous every 6 hours  insulin NPH human recombinant 40 Unit(s) SubCutaneous every 6 hours  lacosamide Solution 200 milliGRAM(s) Oral two times a day  levETIRAcetam  Solution 1000 milliGRAM(s) Oral two times a day  levothyroxine Injectable 120 MICROGram(s) IV Push at bedtime  magnesium sulfate  IVPB 2 Gram(s) IV Intermittent once  metoprolol tartrate 25 milliGRAM(s) Oral two times a day  pantoprazole  Injectable 40 milliGRAM(s) IV Push daily  predniSONE   Tablet 5 milliGRAM(s) Oral at bedtime  predniSONE   Tablet 15 milliGRAM(s) Oral <User Schedule>  simvastatin 20 milliGRAM(s) Oral at bedtime    MEDICATIONS  (PRN):  acetaminophen    Suspension .. 650 milliGRAM(s) Oral every 6 hours PRN Temp greater or equal to 38C (100.4F), Mild Pain (1 - 3)  dextrose 40% Gel 15 Gram(s) Oral once PRN Blood Glucose LESS THAN 70 milliGRAM(s)/deciliter  fentaNYL    Injectable 25 MICROGram(s) IV Push every 2 hours PRN Severe Pain (7 - 10)  glucagon  Injectable 1 milliGRAM(s) IntraMuscular once PRN Glucose LESS THAN 70 milligrams/deciliter  sodium chloride 0.9% lock flush 10 milliLiter(s) IV Push every 1 hour PRN Pre/post blood products, medications, blood draw, and to maintain line patency      Allergies    Depakote (Other)  Seroquel (Other (Severe))    Intolerances        ROS: All other systems reviewed and negative    PHYSICAL EXAM:  VITALS: T(C): 37.6 (06-06-20 @ 11:00)  T(F): 99.7 (06-06-20 @ 11:00), Max: 99.9 (06-05-20 @ 23:00)  HR: 113 (06-06-20 @ 13:15) (102 - 120)  BP: 149/73 (06-06-20 @ 11:00) (124/78 - 149/73)  RR:  (16 - 25)  SpO2:  (98% - 100%)  Wt(kg): --  GENERAL: NAD, well-groomed, well-developed  EYES: No proptosis,  anicteric  HEENT:  Atraumatic, Normocephalic, moist mucous membranes  THYROID: Normal size, no palpable nodules  RESPIRATORY: Clear to auscultation bilaterally; No rales, rhonchi, wheezing, or rubs  CARDIOVASCULAR: Regular rate and rhythm; No murmurs  GI: Soft, nontender, non distended, normal bowel sounds  SKIN: Dry, intact, No rashes or lesions  MUSCULOSKELETAL: Full range of motion, normal strength  NEURO: AOx3, moves all extremities spontaenuously   PSYCH:  reactive affect, euthymic mood      POCT Blood Glucose.: 288 mg/dL (06-06-20 @ 11:16)N40+6  POCT Blood Glucose.: 229 mg/dL (06-06-20 @ 05:09)N40+4  POCT Blood Glucose.: 92 mg/dL (06-05-20 @ 23:19)N40+0  POCT Blood Glucose.: 92 mg/dL (06-05-20 @ 23:18)  POCT Blood Glucose.: 187 mg/dL (06-05-20 @ 17:00)+2  POCT Blood Glucose.: 113 mg/dL (06-05-20 @ 11:09)N44+0  POCT Blood Glucose.: 285 mg/dL (06-05-20 @ 05:04)N46+6  POCT Blood Glucose.: 247 mg/dL (06-05-20 @ 00:42)  POCT Blood Glucose.: 221 mg/dL (06-05-20 @ 00:11)  POCT Blood Glucose.: 239 mg/dL (06-04-20 @ 17:17)  POCT Blood Glucose.: 247 mg/dL (06-04-20 @ 11:43)  POCT Blood Glucose.: 207 mg/dL (06-04-20 @ 08:09)  POCT Blood Glucose.: 136 mg/dL (06-04-20 @ 04:57)  POCT Blood Glucose.: 93 mg/dL (06-03-20 @ 23:23)  POCT Blood Glucose.: 88 mg/dL (06-03-20 @ 23:18)  POCT Blood Glucose.: 197 mg/dL (06-03-20 @ 17:10)      06-06    139  |  103  |  19  ----------------------------<  245<H>  4.3   |  20<L>  |  0.58    EGFR if : 114  EGFR if non : 99    Ca    9.2      06-06  Mg     1.9     06-06  Phos  3.3     06-06            Thyroid Function Tests:  06-04 @ 01:58 TSH -- FreeT4 2.7 T3 -- Anti TPO -- Anti Thyroglobulin Ab -- TSI --  05-30 @ 04:44 TSH 12.30 FreeT4 -- T3 51 Anti TPO -- Anti Thyroglobulin Ab -- TSI -- Chief Complaint: T2DM     History: Currently on continuous TF . Intubated.     MEDICATIONS  (STANDING):  amLODIPine   Tablet 10 milliGRAM(s) Oral daily  cefTRIAXone   IVPB      cefTRIAXone   IVPB 1000 milliGRAM(s) IV Intermittent every 24 hours  chlorhexidine 0.12% Liquid 15 milliLiter(s) Oral Mucosa every 12 hours  chlorhexidine 4% Liquid 1 Application(s) Topical <User Schedule>  dextrose 5%. 1000 milliLiter(s) (50 mL/Hr) IV Continuous <Continuous>  dextrose 50% Injectable 12.5 Gram(s) IV Push once  dextrose 50% Injectable 25 Gram(s) IV Push once  dextrose 50% Injectable 25 Gram(s) IV Push once  enoxaparin Injectable 30 milliGRAM(s) SubCutaneous two times a day  insulin lispro (HumaLOG) corrective regimen sliding scale   SubCutaneous every 6 hours  insulin NPH human recombinant 40 Unit(s) SubCutaneous every 6 hours  lacosamide Solution 200 milliGRAM(s) Oral two times a day  levETIRAcetam  Solution 1000 milliGRAM(s) Oral two times a day  levothyroxine Injectable 120 MICROGram(s) IV Push at bedtime  magnesium sulfate  IVPB 2 Gram(s) IV Intermittent once  metoprolol tartrate 25 milliGRAM(s) Oral two times a day  pantoprazole  Injectable 40 milliGRAM(s) IV Push daily  predniSONE   Tablet 5 milliGRAM(s) Oral at bedtime  predniSONE   Tablet 15 milliGRAM(s) Oral <User Schedule>  simvastatin 20 milliGRAM(s) Oral at bedtime    MEDICATIONS  (PRN):  acetaminophen    Suspension .. 650 milliGRAM(s) Oral every 6 hours PRN Temp greater or equal to 38C (100.4F), Mild Pain (1 - 3)  dextrose 40% Gel 15 Gram(s) Oral once PRN Blood Glucose LESS THAN 70 milliGRAM(s)/deciliter  fentaNYL    Injectable 25 MICROGram(s) IV Push every 2 hours PRN Severe Pain (7 - 10)  glucagon  Injectable 1 milliGRAM(s) IntraMuscular once PRN Glucose LESS THAN 70 milligrams/deciliter  sodium chloride 0.9% lock flush 10 milliLiter(s) IV Push every 1 hour PRN Pre/post blood products, medications, blood draw, and to maintain line patency      Allergies    Depakote (Other)  Seroquel (Other (Severe))    Intolerances        ROS: All other systems reviewed and negative    PHYSICAL EXAM:  VITALS: T(C): 37.6 (06-06-20 @ 11:00)  T(F): 99.7 (06-06-20 @ 11:00), Max: 99.9 (06-05-20 @ 23:00)  HR: 113 (06-06-20 @ 13:15) (102 - 120)  BP: 149/73 (06-06-20 @ 11:00) (124/78 - 149/73)  RR:  (16 - 25)  SpO2:  (98% - 100%)  Wt(kg): --  GENERAL: NAD, well-groomed, well-developed  EYES: No proptosis,  anicteric  HEENT:  Atraumatic, Normocephalic, moist mucous membranes  THYROID: Normal size, no palpable nodules  RESPIRATORY: Clear to auscultation bilaterally; No rales, rhonchi, wheezing, or rubs  CARDIOVASCULAR: Regular rate and rhythm; No murmurs  GI: Soft, nontender, non distended, normal bowel sounds  SKIN: Dry, intact, No rashes or lesions  MUSCULOSKELETAL: Full range of motion, normal strength  NEURO: AOx3, moves all extremities spontaenuously   PSYCH:  reactive affect, euthymic mood      POCT Blood Glucose.: 288 mg/dL (06-06-20 @ 11:16)N40+6  POCT Blood Glucose.: 229 mg/dL (06-06-20 @ 05:09)N40+4  POCT Blood Glucose.: 92 mg/dL (06-05-20 @ 23:19)N40+0  POCT Blood Glucose.: 92 mg/dL (06-05-20 @ 23:18)  POCT Blood Glucose.: 187 mg/dL (06-05-20 @ 17:00)+2  POCT Blood Glucose.: 113 mg/dL (06-05-20 @ 11:09)N44+0  POCT Blood Glucose.: 285 mg/dL (06-05-20 @ 05:04)N46+6  POCT Blood Glucose.: 247 mg/dL (06-05-20 @ 00:42)  POCT Blood Glucose.: 221 mg/dL (06-05-20 @ 00:11)  POCT Blood Glucose.: 239 mg/dL (06-04-20 @ 17:17)  POCT Blood Glucose.: 247 mg/dL (06-04-20 @ 11:43)  POCT Blood Glucose.: 207 mg/dL (06-04-20 @ 08:09)  POCT Blood Glucose.: 136 mg/dL (06-04-20 @ 04:57)  POCT Blood Glucose.: 93 mg/dL (06-03-20 @ 23:23)  POCT Blood Glucose.: 88 mg/dL (06-03-20 @ 23:18)  POCT Blood Glucose.: 197 mg/dL (06-03-20 @ 17:10)      06-06    139  |  103  |  19  ----------------------------<  245<H>  4.3   |  20<L>  |  0.58    EGFR if : 114  EGFR if non : 99    Ca    9.2      06-06  Mg     1.9     06-06  Phos  3.3     06-06            Thyroid Function Tests:  06-04 @ 01:58 TSH -- FreeT4 2.7 T3 -- Anti TPO -- Anti Thyroglobulin Ab -- TSI --  05-30 @ 04:44 TSH 12.30 FreeT4 -- T3 51 Anti TPO -- Anti Thyroglobulin Ab -- TSI --

## 2020-06-06 NOTE — PROGRESS NOTE ADULT - ASSESSMENT
65 yo female with T2D uncontrolled while inpatient (HbA1c 7.4%) with no known complications and hypothyroidism here with mental status changes associated with shaking found to have a left frontal parenchymal hematoma with mass effect on the left lateral ventricle and intraventricular extension with blood layering in the occipital horns. Endocrine following for hyperglycemia.    Uncontrolled DM2   FS goal 140-180, above goal   FS q6h  Currently on tube feed Glucerna at goal 65ml/hr  Patient currently on Prednisone 15 + 5 (please notify endo team of any changes)  Recommend NPH 44 Units q 6 hours. However, if patient has FS<150, consider decreasing to 40 Units q 6 hours  Recommend continue mod correctional scale q6h  Hold NPH if TFs are held and notify endocrine team of any change in nutritional status   Discharge on basal/bolus insulin, dose to be determined   Discussed with NS team    Hypothyroidism   5/30 TSH 12.3  6/4 FT4 2.7  Recommend Synthroid 120 mcg IV qpm     HTN   Will defer management to NSCU team   continue metoprolol and norvasc    HLD   Continue statin     Huseyin Traore MD, Endocrine Fellow   Pager  from 9-5PM. After hours and on weekends please call [phone] 63 yo female with T2D uncontrolled while inpatient (HbA1c 7.4%) with no known complications and hypothyroidism here with mental status changes associated with shaking found to have a left frontal parenchymal hematoma with mass effect on the left lateral ventricle and intraventricular extension with blood layering in the occipital horns. Endocrine following for hyperglycemia.    Uncontrolled DM2   FS goal 140-180, above goal   FS q6h  Currently on tube feed Glucerna at goal 65ml/hr  Patient currently on Prednisone 15 + 5 (please notify endo team of any changes)  Recommend NPH 44 Units for (6A, 12P, 12A) and NPH 40 units for (6P).   Recommend continue mod correctional scale q6h  Hold NPH if TFs are held and notify endocrine team of any change in nutritional status   Discharge on basal/bolus insulin, dose to be determined   Discussed with NS team    Hypothyroidism   5/30 TSH 12.3  6/4 FT4 2.7  Recommend Synthroid 120 mcg IV qpm     HTN   Will defer management to NSCU team   continue metoprolol and norvasc    HLD   Continue statin     Huseyin Traore MD, Endocrine Fellow   Pager  from 9-5PM. After hours and on weekends please call [phone]

## 2020-06-06 NOTE — PROGRESS NOTE ADULT - SUBJECTIVE AND OBJECTIVE BOX
HPI: 64 year old female with PMHx of Hypothyroidism, HTN, DM, COPD/CHRIS, RA (Prednisone), SBO s/p ex lap with lysis of adhesions c/b evisceration, recent hospitalization in 3/2020 with encephalopathy, significantly hypothyroid (, without myxedema coma), small (stable) right sided SDH, UTI, catatonic reaction to VPA (now resolved) and DKA and psychiatric issues, 5/2020 hospitalization for GIB, Emphysematous cystitis, endometritis, Sigmoid colitis, morbid obesity, functional quadriplegia. Presented on 5/29 with altered mental status. Patient was confused, didn't recognize son. Also found to be gasping for air, ?seizure.      SUBJECTIVE: Pt seen and examined.       OVERNIGHT EVENTS: Hb decreased to 6.8 today    ICU Vital Signs Last 24 Hrs  T(C): 37.4 (06 Jun 2020 07:00), Max: 38 (05 Jun 2020 11:00)  T(F): 99.3 (06 Jun 2020 07:00), Max: 100.4 (05 Jun 2020 11:00)  HR: 109 (06 Jun 2020 08:54) (102 - 126)  BP: 124/78 (06 Jun 2020 08:00) (124/78 - 124/78)  BP(mean): 90 (06 Jun 2020 08:00) (90 - 90)  ABP: 121/64 (06 Jun 2020 08:00) (113/67 - 141/73)  ABP(mean): 87 (06 Jun 2020 08:00) (83 - 99)  RR: 19 (06 Jun 2020 08:00) (16 - 27)  SpO2: 99% (06 Jun 2020 08:54) (96% - 100%)      06-05-20 @ 07:01  -  06-06-20 @ 07:00  --------------------------------------------------------  IN: 2410 mL / OUT: 1550 mL / NET: 860 mL    06-06-20 @ 07:01  -  06-06-20 @ 10:43  --------------------------------------------------------  IN: 130 mL / OUT: 0 mL / NET: 130 mL        Mode: AC/ CMV (Assist Control/ Continuous Mandatory Ventilation), RR (machine): 14, TV (machine): 450, FiO2: 30, PEEP: 5, ITime: 0.8, MAP: 12, PIP: 28    acetaminophen    Suspension .. 650 milliGRAM(s) Oral every 6 hours PRN  amLODIPine   Tablet 10 milliGRAM(s) Oral daily  cefTRIAXone   IVPB      cefTRIAXone   IVPB 1000 milliGRAM(s) IV Intermittent every 24 hours  chlorhexidine 0.12% Liquid 15 milliLiter(s) Oral Mucosa every 12 hours  chlorhexidine 4% Liquid 1 Application(s) Topical <User Schedule>  dextrose 40% Gel 15 Gram(s) Oral once PRN  dextrose 5%. 1000 milliLiter(s) (50 mL/Hr) IV Continuous <Continuous>  dextrose 50% Injectable 12.5 Gram(s) IV Push once  dextrose 50% Injectable 25 Gram(s) IV Push once  dextrose 50% Injectable 25 Gram(s) IV Push once  enoxaparin Injectable 30 milliGRAM(s) SubCutaneous two times a day  fentaNYL    Injectable 25 MICROGram(s) IV Push every 2 hours PRN  glucagon  Injectable 1 milliGRAM(s) IntraMuscular once PRN  insulin lispro (HumaLOG) corrective regimen sliding scale   SubCutaneous every 6 hours  insulin NPH human recombinant 40 Unit(s) SubCutaneous every 6 hours  lacosamide Solution 200 milliGRAM(s) Oral two times a day  levETIRAcetam  Solution 1000 milliGRAM(s) Oral two times a day  levothyroxine Injectable 120 MICROGram(s) IV Push at bedtime  metoprolol tartrate 12.5 milliGRAM(s) Oral two times a day  pantoprazole  Injectable 40 milliGRAM(s) IV Push daily  predniSONE   Tablet 5 milliGRAM(s) Oral at bedtime  predniSONE   Tablet 15 milliGRAM(s) Oral <User Schedule>  simvastatin 20 milliGRAM(s) Oral at bedtime  sodium chloride 0.9% lock flush 10 milliLiter(s) IV Push every 1 hour PRN      LABS:  Na: 139 (06-06 @ 06:00), 142 (06-04 @ 21:48), 145 (06-03 @ 22:15)  K: 4.3 (06-06 @ 06:00), 4.3 (06-04 @ 21:48), 3.7 (06-03 @ 22:15)  Cl: 103 (06-06 @ 06:00), 105 (06-04 @ 21:48), 106 (06-03 @ 22:15)  CO2: 20 (06-06 @ 06:00), 22 (06-04 @ 21:48), 25 (06-03 @ 22:15)  BUN: 19 (06-06 @ 06:00), 18 (06-04 @ 21:48), 16 (06-03 @ 22:15)  Cr: 0.58 (06-06 @ 06:00), 0.67 (06-04 @ 21:48), 0.63 (06-03 @ 22:15)  Glu: 245(06-06 @ 06:00), 217(06-04 @ 21:48), 93(06-03 @ 22:15)    Hgb: 6.8 (06-06 @ 06:00), 7.3 (06-04 @ 21:48), 7.8 (06-03 @ 22:15)  Hct: 22.8 (06-06 @ 06:00), 24.7 (06-04 @ 21:48), 25.6 (06-03 @ 22:15)  WBC: 13.28 (06-06 @ 06:00), 13.00 (06-04 @ 21:48), 13.73 (06-03 @ 22:15)  Plt: 451 (06-06 @ 06:00), 435 (06-04 @ 21:48), 391 (06-03 @ 22:15)    INR:   PTT:         General: No Acute Distress     Neurological: Intubated, pupils 3mm and reactive, +corneal reflexes, +cough/gag, +overbreathing vent set rate, no command following, left gaze preference, RUE 0/5, LUE WD,.  BLE TF    Pulmonary: Clear to Auscultation, No Rales, No Rhonchi, No Wheezes     Cardiovascular: S1, S2, Regular Rate and Rhythm     Gastrointestinal: Soft, Nontender, Nondistended     MEDICATIONS:   Antibiotics:  cefTRIAXone   IVPB      cefTRIAXone   IVPB 1000 milliGRAM(s) IV Intermittent every 24 hours    Neuro:  acetaminophen    Suspension .. 650 milliGRAM(s) Oral every 6 hours PRN Temp greater or equal to 38C (100.4F), Mild Pain (1 - 3)  fentaNYL    Injectable 25 MICROGram(s) IV Push every 2 hours PRN Severe Pain (7 - 10)  lacosamide Solution 200 milliGRAM(s) Oral two times a day  levETIRAcetam  Solution 1000 milliGRAM(s) Oral two times a day    Anticoagulation:  enoxaparin Injectable 40 milliGRAM(s) SubCutaneous <User Schedule>    Cardiology:  amLODIPine   Tablet 10 milliGRAM(s) Oral daily  metoprolol tartrate 12.5 milliGRAM(s) Oral two times a day  simvastatin 20 milliGRAM(s) Oral at bedtime    Endo:   insulin lispro (HumaLOG) corrective regimen sliding scale   SubCutaneous every 6 hours  insulin NPH human recombinant 40 Unit(s) SubCutaneous <User Schedule>  insulin NPH human recombinant 46 Unit(s) SubCutaneous <User Schedule>  levothyroxine Injectable 120 MICROGram(s) IV Push at bedtime  predniSONE   Tablet 5 milliGRAM(s) Oral at bedtime  predniSONE   Tablet 15 milliGRAM(s) Oral <User Schedule>    Pulm:    GI/:  pantoprazole  Injectable 40 milliGRAM(s) IV Push daily    Other:  chlorhexidine 0.12% Liquid 15 milliLiter(s) Oral Mucosa every 12 hours  chlorhexidine 4% Liquid 1 Application(s) Topical <User Schedule>  dextrose 5%. 1000 milliLiter(s) IV Continuous <Continuous>  sodium chloride 0.9% lock flush 10 milliLiter(s) IV Push every 1 hour PRN Pre/post blood products, medications, blood draw, and to maintain line patency HPI: 64 year old female with PMHx of Hypothyroidism, HTN, DM, COPD/CHRIS, RA (Prednisone), SBO s/p ex lap with lysis of adhesions c/b evisceration, recent hospitalization in 3/2020 with encephalopathy, significantly hypothyroid (, without myxedema coma), small (stable) right sided SDH, UTI, catatonic reaction to VPA (now resolved) and DKA and psychiatric issues, 5/2020 hospitalization for GIB, Emphysematous cystitis, endometritis, Sigmoid colitis, morbid obesity, functional quadriplegia. Presented on 5/29 with altered mental status. Patient was confused, didn't recognize son. Also found to be gasping for air, ?seizure.      SUBJECTIVE: Pt seen and examined.     OVERNIGHT EVENTS: Hb decreased to 6.8 today    ICU Vital Signs Last 24 Hrs  T(C): 37.4 (06 Jun 2020 07:00), Max: 38 (05 Jun 2020 11:00)  T(F): 99.3 (06 Jun 2020 07:00), Max: 100.4 (05 Jun 2020 11:00)  HR: 109 (06 Jun 2020 08:54) (102 - 126)  BP: 124/78 (06 Jun 2020 08:00) (124/78 - 124/78)  BP(mean): 90 (06 Jun 2020 08:00) (90 - 90)  ABP: 121/64 (06 Jun 2020 08:00) (113/67 - 141/73)  ABP(mean): 87 (06 Jun 2020 08:00) (83 - 99)  RR: 19 (06 Jun 2020 08:00) (16 - 27)  SpO2: 99% (06 Jun 2020 08:54) (96% - 100%)      06-05-20 @ 07:01  -  06-06-20 @ 07:00  --------------------------------------------------------  IN: 2410 mL / OUT: 1550 mL / NET: 860 mL    06-06-20 @ 07:01  -  06-06-20 @ 10:43  --------------------------------------------------------  IN: 130 mL / OUT: 0 mL / NET: 130 mL        Mode: AC/ CMV (Assist Control/ Continuous Mandatory Ventilation), RR (machine): 14, TV (machine): 450, FiO2: 30, PEEP: 5, ITime: 0.8, MAP: 12, PIP: 28    acetaminophen    Suspension .. 650 milliGRAM(s) Oral every 6 hours PRN  amLODIPine   Tablet 10 milliGRAM(s) Oral daily  cefTRIAXone   IVPB      cefTRIAXone   IVPB 1000 milliGRAM(s) IV Intermittent every 24 hours  chlorhexidine 0.12% Liquid 15 milliLiter(s) Oral Mucosa every 12 hours  chlorhexidine 4% Liquid 1 Application(s) Topical <User Schedule>  dextrose 40% Gel 15 Gram(s) Oral once PRN  dextrose 5%. 1000 milliLiter(s) (50 mL/Hr) IV Continuous <Continuous>  dextrose 50% Injectable 12.5 Gram(s) IV Push once  dextrose 50% Injectable 25 Gram(s) IV Push once  dextrose 50% Injectable 25 Gram(s) IV Push once  enoxaparin Injectable 30 milliGRAM(s) SubCutaneous two times a day  fentaNYL    Injectable 25 MICROGram(s) IV Push every 2 hours PRN  glucagon  Injectable 1 milliGRAM(s) IntraMuscular once PRN  insulin lispro (HumaLOG) corrective regimen sliding scale   SubCutaneous every 6 hours  insulin NPH human recombinant 40 Unit(s) SubCutaneous every 6 hours  lacosamide Solution 200 milliGRAM(s) Oral two times a day  levETIRAcetam  Solution 1000 milliGRAM(s) Oral two times a day  levothyroxine Injectable 120 MICROGram(s) IV Push at bedtime  metoprolol tartrate 12.5 milliGRAM(s) Oral two times a day  pantoprazole  Injectable 40 milliGRAM(s) IV Push daily  predniSONE   Tablet 5 milliGRAM(s) Oral at bedtime  predniSONE   Tablet 15 milliGRAM(s) Oral <User Schedule>  simvastatin 20 milliGRAM(s) Oral at bedtime  sodium chloride 0.9% lock flush 10 milliLiter(s) IV Push every 1 hour PRN      LABS:  Na: 139 (06-06 @ 06:00), 142 (06-04 @ 21:48), 145 (06-03 @ 22:15)  K: 4.3 (06-06 @ 06:00), 4.3 (06-04 @ 21:48), 3.7 (06-03 @ 22:15)  Cl: 103 (06-06 @ 06:00), 105 (06-04 @ 21:48), 106 (06-03 @ 22:15)  CO2: 20 (06-06 @ 06:00), 22 (06-04 @ 21:48), 25 (06-03 @ 22:15)  BUN: 19 (06-06 @ 06:00), 18 (06-04 @ 21:48), 16 (06-03 @ 22:15)  Cr: 0.58 (06-06 @ 06:00), 0.67 (06-04 @ 21:48), 0.63 (06-03 @ 22:15)  Glu: 245(06-06 @ 06:00), 217(06-04 @ 21:48), 93(06-03 @ 22:15)    Hgb: 6.8 (06-06 @ 06:00), 7.3 (06-04 @ 21:48), 7.8 (06-03 @ 22:15)  Hct: 22.8 (06-06 @ 06:00), 24.7 (06-04 @ 21:48), 25.6 (06-03 @ 22:15)  WBC: 13.28 (06-06 @ 06:00), 13.00 (06-04 @ 21:48), 13.73 (06-03 @ 22:15)  Plt: 451 (06-06 @ 06:00), 435 (06-04 @ 21:48), 391 (06-03 @ 22:15)      General: No Acute Distress   Neurological: Intubated, pupils 3mm and reactive, +corneal reflexes, +cough/gag, +overbreathing vent set rate, no command following, left gaze preference, RUE 0/5, LUE WD,  BLE TF  Pulmonary: Clear to Auscultation, No Rales, No Rhonchi, No Wheezes   Cardiovascular: S1, S2, Regular Rate and Rhythm   Gastrointestinal: Soft, Nontender, Nondistended     MEDICATIONS:   Antibiotics:  cefTRIAXone   IVPB      cefTRIAXone   IVPB 1000 milliGRAM(s) IV Intermittent every 24 hours    Neuro:  acetaminophen    Suspension .. 650 milliGRAM(s) Oral every 6 hours PRN Temp greater or equal to 38C (100.4F), Mild Pain (1 - 3)  fentaNYL    Injectable 25 MICROGram(s) IV Push every 2 hours PRN Severe Pain (7 - 10)  lacosamide Solution 200 milliGRAM(s) Oral two times a day  levETIRAcetam  Solution 1000 milliGRAM(s) Oral two times a day    Anticoagulation:  enoxaparin Injectable 40 milliGRAM(s) SubCutaneous <User Schedule>    Cardiology:  amLODIPine   Tablet 10 milliGRAM(s) Oral daily  metoprolol tartrate 12.5 milliGRAM(s) Oral two times a day  simvastatin 20 milliGRAM(s) Oral at bedtime    Endo:   insulin lispro (HumaLOG) corrective regimen sliding scale   SubCutaneous every 6 hours  insulin NPH human recombinant 40 Unit(s) SubCutaneous <User Schedule>  insulin NPH human recombinant 46 Unit(s) SubCutaneous <User Schedule>  levothyroxine Injectable 120 MICROGram(s) IV Push at bedtime  predniSONE   Tablet 5 milliGRAM(s) Oral at bedtime  predniSONE   Tablet 15 milliGRAM(s) Oral <User Schedule>    Pulm:    GI/:  pantoprazole  Injectable 40 milliGRAM(s) IV Push daily    Other:  chlorhexidine 0.12% Liquid 15 milliLiter(s) Oral Mucosa every 12 hours  chlorhexidine 4% Liquid 1 Application(s) Topical <User Schedule>  dextrose 5%. 1000 milliLiter(s) IV Continuous <Continuous>  sodium chloride 0.9% lock flush 10 milliLiter(s) IV Push every 1 hour PRN Pre/post blood products, medications, blood draw, and to maintain line patency

## 2020-06-06 NOTE — CHART NOTE - NSCHARTNOTEFT_GEN_A_CORE
Called pt's daughter Heavenly today.  Stated that on previous phone call she got disconnected. Was apologetic about this and engaging in today's conversation  Has been in contact with her brother but stated that she did not know the details about her mother's current condition  Explained neurologic status and prognosis.   Explained goals of care (trach/PEG, comfort)  She would like to discuss with her family and reach out to the team on Monday 6/8 along with her brother.   States that 2pm would work for her.  Will have social work set up meeting.

## 2020-06-07 LAB
ANION GAP SERPL CALC-SCNC: 15 MMOL/L — SIGNIFICANT CHANGE UP (ref 5–17)
BUN SERPL-MCNC: 19 MG/DL — SIGNIFICANT CHANGE UP (ref 7–23)
CALCIUM SERPL-MCNC: 9.6 MG/DL — SIGNIFICANT CHANGE UP (ref 8.4–10.5)
CHLORIDE SERPL-SCNC: 106 MMOL/L — SIGNIFICANT CHANGE UP (ref 96–108)
CO2 SERPL-SCNC: 23 MMOL/L — SIGNIFICANT CHANGE UP (ref 22–31)
CREAT SERPL-MCNC: 0.53 MG/DL — SIGNIFICANT CHANGE UP (ref 0.5–1.3)
GLUCOSE BLDC GLUCOMTR-MCNC: 147 MG/DL — HIGH (ref 70–99)
GLUCOSE BLDC GLUCOMTR-MCNC: 174 MG/DL — HIGH (ref 70–99)
GLUCOSE BLDC GLUCOMTR-MCNC: 199 MG/DL — HIGH (ref 70–99)
GLUCOSE BLDC GLUCOMTR-MCNC: 231 MG/DL — HIGH (ref 70–99)
GLUCOSE BLDC GLUCOMTR-MCNC: 231 MG/DL — HIGH (ref 70–99)
GLUCOSE BLDC GLUCOMTR-MCNC: 66 MG/DL — LOW (ref 70–99)
GLUCOSE BLDC GLUCOMTR-MCNC: 77 MG/DL — SIGNIFICANT CHANGE UP (ref 70–99)
GLUCOSE BLDC GLUCOMTR-MCNC: 94 MG/DL — SIGNIFICANT CHANGE UP (ref 70–99)
GLUCOSE SERPL-MCNC: 101 MG/DL — HIGH (ref 70–99)
HCT VFR BLD CALC: 28.3 % — LOW (ref 34.5–45)
HGB BLD-MCNC: 9 G/DL — LOW (ref 11.5–15.5)
MAGNESIUM SERPL-MCNC: 2.4 MG/DL — SIGNIFICANT CHANGE UP (ref 1.6–2.6)
MCHC RBC-ENTMCNC: 29.2 PG — SIGNIFICANT CHANGE UP (ref 27–34)
MCHC RBC-ENTMCNC: 31.8 GM/DL — LOW (ref 32–36)
MCV RBC AUTO: 91.9 FL — SIGNIFICANT CHANGE UP (ref 80–100)
N-METHYL-DA RECEPTOR AB IGG BY CBA-IFA, SERUM W/RFLX TITER: SIGNIFICANT CHANGE UP
NRBC # BLD: 0 /100 WBCS — SIGNIFICANT CHANGE UP (ref 0–0)
PHOSPHATE SERPL-MCNC: 3.6 MG/DL — SIGNIFICANT CHANGE UP (ref 2.5–4.5)
PLATELET # BLD AUTO: 440 K/UL — HIGH (ref 150–400)
POTASSIUM SERPL-MCNC: 3.8 MMOL/L — SIGNIFICANT CHANGE UP (ref 3.5–5.3)
POTASSIUM SERPL-SCNC: 3.8 MMOL/L — SIGNIFICANT CHANGE UP (ref 3.5–5.3)
RBC # BLD: 3.08 M/UL — LOW (ref 3.8–5.2)
RBC # FLD: 20.7 % — HIGH (ref 10.3–14.5)
SARS-COV-2 RNA SPEC QL NAA+PROBE: SIGNIFICANT CHANGE UP
SODIUM SERPL-SCNC: 144 MMOL/L — SIGNIFICANT CHANGE UP (ref 135–145)
WBC # BLD: 12.55 K/UL — HIGH (ref 3.8–10.5)
WBC # FLD AUTO: 12.55 K/UL — HIGH (ref 3.8–10.5)

## 2020-06-07 PROCEDURE — 99291 CRITICAL CARE FIRST HOUR: CPT

## 2020-06-07 PROCEDURE — 71045 X-RAY EXAM CHEST 1 VIEW: CPT | Mod: 26

## 2020-06-07 PROCEDURE — 99292 CRITICAL CARE ADDL 30 MIN: CPT

## 2020-06-07 RX ORDER — HUMAN INSULIN 100 [IU]/ML
40 INJECTION, SUSPENSION SUBCUTANEOUS
Refills: 0 | Status: DISCONTINUED | OUTPATIENT
Start: 2020-06-07 | End: 2020-06-08

## 2020-06-07 RX ORDER — SODIUM CHLORIDE 9 MG/ML
500 INJECTION INTRAMUSCULAR; INTRAVENOUS; SUBCUTANEOUS ONCE
Refills: 0 | Status: COMPLETED | OUTPATIENT
Start: 2020-06-07 | End: 2020-06-07

## 2020-06-07 RX ORDER — METOPROLOL TARTRATE 50 MG
25 TABLET ORAL
Refills: 0 | Status: DISCONTINUED | OUTPATIENT
Start: 2020-06-07 | End: 2020-06-14

## 2020-06-07 RX ORDER — HUMAN INSULIN 100 [IU]/ML
44 INJECTION, SUSPENSION SUBCUTANEOUS
Refills: 0 | Status: DISCONTINUED | OUTPATIENT
Start: 2020-06-07 | End: 2020-06-08

## 2020-06-07 RX ORDER — POTASSIUM CHLORIDE 20 MEQ
40 PACKET (EA) ORAL ONCE
Refills: 0 | Status: COMPLETED | OUTPATIENT
Start: 2020-06-07 | End: 2020-06-07

## 2020-06-07 RX ORDER — HUMAN INSULIN 100 [IU]/ML
36 INJECTION, SUSPENSION SUBCUTANEOUS
Refills: 0 | Status: DISCONTINUED | OUTPATIENT
Start: 2020-06-07 | End: 2020-06-08

## 2020-06-07 RX ORDER — INSULIN LISPRO 100/ML
2 VIAL (ML) SUBCUTANEOUS ONCE
Refills: 0 | Status: COMPLETED | OUTPATIENT
Start: 2020-06-07 | End: 2020-06-07

## 2020-06-07 RX ADMIN — ENOXAPARIN SODIUM 30 MILLIGRAM(S): 100 INJECTION SUBCUTANEOUS at 06:18

## 2020-06-07 RX ADMIN — LEVETIRACETAM 1000 MILLIGRAM(S): 250 TABLET, FILM COATED ORAL at 06:18

## 2020-06-07 RX ADMIN — Medication 15 MILLIGRAM(S): at 06:18

## 2020-06-07 RX ADMIN — PANTOPRAZOLE SODIUM 40 MILLIGRAM(S): 20 TABLET, DELAYED RELEASE ORAL at 11:47

## 2020-06-07 RX ADMIN — MEROPENEM 100 MILLIGRAM(S): 1 INJECTION INTRAVENOUS at 13:02

## 2020-06-07 RX ADMIN — Medication 40 MILLIEQUIVALENT(S): at 03:05

## 2020-06-07 RX ADMIN — HUMAN INSULIN 40 UNIT(S): 100 INJECTION, SUSPENSION SUBCUTANEOUS at 23:34

## 2020-06-07 RX ADMIN — SIMVASTATIN 20 MILLIGRAM(S): 20 TABLET, FILM COATED ORAL at 23:42

## 2020-06-07 RX ADMIN — ENOXAPARIN SODIUM 30 MILLIGRAM(S): 100 INJECTION SUBCUTANEOUS at 17:05

## 2020-06-07 RX ADMIN — HUMAN INSULIN 36 UNIT(S): 100 INJECTION, SUSPENSION SUBCUTANEOUS at 17:05

## 2020-06-07 RX ADMIN — CHLORHEXIDINE GLUCONATE 15 MILLILITER(S): 213 SOLUTION TOPICAL at 17:05

## 2020-06-07 RX ADMIN — Medication 2 UNIT(S): at 03:05

## 2020-06-07 RX ADMIN — LACOSAMIDE 200 MILLIGRAM(S): 50 TABLET ORAL at 17:08

## 2020-06-07 RX ADMIN — HUMAN INSULIN 44 UNIT(S): 100 INJECTION, SUSPENSION SUBCUTANEOUS at 11:46

## 2020-06-07 RX ADMIN — SODIUM CHLORIDE 1000 MILLILITER(S): 9 INJECTION INTRAMUSCULAR; INTRAVENOUS; SUBCUTANEOUS at 04:00

## 2020-06-07 RX ADMIN — MEROPENEM 100 MILLIGRAM(S): 1 INJECTION INTRAVENOUS at 23:23

## 2020-06-07 RX ADMIN — LACOSAMIDE 200 MILLIGRAM(S): 50 TABLET ORAL at 06:20

## 2020-06-07 RX ADMIN — Medication 2: at 23:38

## 2020-06-07 RX ADMIN — Medication 5 MILLIGRAM(S): at 23:42

## 2020-06-07 RX ADMIN — Medication 25 MILLIGRAM(S): at 17:06

## 2020-06-07 RX ADMIN — HUMAN INSULIN 44 UNIT(S): 100 INJECTION, SUSPENSION SUBCUTANEOUS at 06:20

## 2020-06-07 RX ADMIN — Medication 2: at 06:20

## 2020-06-07 RX ADMIN — MEROPENEM 100 MILLIGRAM(S): 1 INJECTION INTRAVENOUS at 06:19

## 2020-06-07 RX ADMIN — Medication 25 MILLIGRAM(S): at 06:19

## 2020-06-07 RX ADMIN — LEVETIRACETAM 1000 MILLIGRAM(S): 250 TABLET, FILM COATED ORAL at 17:06

## 2020-06-07 RX ADMIN — Medication 4: at 17:03

## 2020-06-07 RX ADMIN — Medication 4: at 11:46

## 2020-06-07 RX ADMIN — AMLODIPINE BESYLATE 10 MILLIGRAM(S): 2.5 TABLET ORAL at 06:18

## 2020-06-07 RX ADMIN — CHLORHEXIDINE GLUCONATE 15 MILLILITER(S): 213 SOLUTION TOPICAL at 06:19

## 2020-06-07 NOTE — PROGRESS NOTE ADULT - ASSESSMENT
ASSESSMENT:  64 year old female with PMHx of Hypothyroidism, HTN, DM, COPD/CHRIS, RA (Prednisone), SBO s/p ex lap with lysis of adhesions c/b evisceration, recent hospitalization in 3/2020 with encephalopathy, significantly hypothyroid (, without myxedema coma), small (stable) right sided SDH, UTI, catatonic reaction to VPA (now resolved) and DKA and psychiatric issues, 5/2020 hospitalization for GIB, Emphysematous cystitis, endometritis, Sigmoid colitis, morbid obesity, functional quadriplegia. Presented on 5/29 with altered mental status. Patient was confused, didn't recognize son. Also found to be gasping for air, ?seizure. 	  HCP: Norris Rush, Son [phone]; Daughter, Heavenly Schmidt [phone]; son asks that only him or his sisterHeavenly be contacted for patient updates/information as they are both proxy's for patient. Medical ethics contacted.       NEURO:  Q4 neurochecks  Cont Keppra and Vimpat for seizures  Sedation fentanyl PRN   Pain management with Tylenol.   Given previous psych history and workup not revealing any cause, r/o NMDA encephalitis. Follow up report   Trach and PEG discussed with family.   Activity: [] OOB as tolerated [X] Bedrest [X] PT [X] OT [] PMNR      PULM:  Intubated since 5/29  History of COPD was on prednisone at home, now on prednisone 15 mg and  5 mg daily   CPAP as tolerated    CV:  Keep -160  TTE negative for PFO  HTN: continue norvasc and metoprolol    Statin for HLD     RENAL:  Na wnl  IVL         GI:  Diet: On Glucerna Tube feeds  Last BM 6/7  Will need to discuss PEG with family  GI prophylaxis [] not indicated [X] PPI [] other:      ENDO:   Endo Following NPH 40 and 44  Continue synthroid. Endocrine following.   Continue prednisone 15mg, 5 mg at bedtime (was on it at home for RA)  Goal euglycemia (-180)      HEME/ONC:  On SQL.  VTE prophylaxis: [X] SCDs [X] chemoprophylaxis [] high risk of DVT/PE on admission due to:  LE doppler 5/30- No DVT  Anemia- improved     ID:  afebrile   meopnem for ESBL in urine         MISC: Palliative Care consulted     SOCIAL/FAMILY:  [X] updated family via phone  [] family meeting    CODE STATUS:  [X] Full Code [] DNR [] DNI [] Palliative/Comfort Care    DISPOSITION:  [X] ICU [] Stroke Unit [] Floor [] EMU [] RCU [] PCU    [X] Patient is at high risk of neurologic deterioration/death due to: resp failure, seizures, hemorrhage.     Time spent: _45  [X] critical care minutes

## 2020-06-07 NOTE — PROGRESS NOTE ADULT - ASSESSMENT
The start time of this visit is 4:56 PM.    Health Maintenance   Topic Date Due   • Medicare Wellness 65+  12/31/2018 (Originally 1/13/2018)   • Influenza Vaccine (1) 09/01/2018   • Breast Cancer Screening  11/06/2019   • DTaP/Tdap/Td Vaccine (2 - Td) 04/15/2024   • Colorectal Cancer Screening-Colonoscopy  01/24/2027   • Osteoporosis Screening  Completed   • Hepatitis C Screening  Completed   • Pneumococcal Vaccine 65+ Low/Medium Risk  Completed        The chief complaint is:  Chronic pain    Was on letrozole up to about 8 months ago.  This can cause bone and joint pain and fatigue.  Has burning pain pain in the loer extremities that hits like a lightning bolt.      Feels fatigue also, that in the past was treated with IM B12 by Dr. Smith.  Her serum B12 levels have always been in the 700's.      There is  No tenderness on palpation of the muscles of the lower extremity  There are normal pedal pulses  There is normal vibratory and light touch sensation of the feet.    ASSESS: chronic fatigue (probably due to morbid obesity)/meralgia paresthetica    She will take B12 as well as B6 per her own suggestion.  She knows she needs weight loss to take care of the meralgia paresthetica.      Hypertension       Visit Vitals  /72   Pulse 82        The patient has no referable symptoms such as headache or dizziness.  There is no chest pain of shortness of breath.  There are no reported side effects from the medication.    Overall appearance: morbidly obese  Skin turgor is normal.  S1 and S2 are normal.  There is no JVD. The PMI is normal.  Gallops: none   Murmurs: none  Carotids: no bruits; normal and equal pulsations  Thyroid: normal size; normal shape; no nodules  Chest: normal configuration; percussion is normal; breath sounds are normal.      The blood pressure is acceptable.  Medication changes: she went back on furosemide and K and stopped hydrochlorothiazide because of lower extremities edema.  Stay on same meds.      3) Recurring otitis media    She has no symptoms or signs now, but this has been a recurring problem.  She wants an antibiotic to have on hand.  Ofloxacin renewed.       4)   Pre-diabetes    The patient's weight is still grossly high .   There is no sudden change in vision.  There is no polydipsia or polyuria.  There is no complaint of numbness in her extremities.  Glucose (mg/dL)   Date Value   08/10/2018 118 (H)     This is higher than the last sugar.  Weight loss and exercise continue to be advised.  The patient is reminded of the implications of pre-diabetes.  The patient is reminded that pre-diabetes is reversible, but diabetes mellitus is not.   65 yo female with T2D uncontrolled while inpatient (HbA1c 7.4%) with no known complications and hypothyroidism here with mental status changes associated with shaking found to have a left frontal parenchymal hematoma with mass effect on the left lateral ventricle and intraventricular extension with blood layering in the occipital horns. Endocrine following for hyperglycemia.    Uncontrolled DM2   FS goal 140-180, above goal   FS q6h  Currently on tube feed Glucerna at goal 65ml/hr  Patient currently on Prednisone 15 + 5 (please notify endo team of any changes)  Recommend NPH 44 Units for (6A, 12P), NPH 36 units for (6P) and NPH 40 units for midnight 12A. Variable glucoses may have to do with BID dosing of prednisone of 15 mg and 5 mg as biological half life is ~ 18 hrs.   Recommend continue mod correctional scale q6h  Hold NPH if TFs are held and notify endocrine team of any change in nutritional status   Discharge on basal/bolus insulin, dose to be determined   Discussed with NS team    Hypothyroidism   5/30 TSH 12.3  6/4 FT4 2.7  Recommend Synthroid 120 mcg IV qpm     HTN   Will defer management to NSCU team   continue metoprolol and norvasc    HLD   Continue statin     Huseyin Traore MD, Endocrine Fellow   Pager  from 9-5PM. After hours and on weekends please call [phone] 63 yo female with T2D uncontrolled while inpatient (HbA1c 7.4%) with no known complications and hypothyroidism here with mental status changes associated with shaking found to have a left frontal parenchymal hematoma with mass effect on the left lateral ventricle and intraventricular extension with blood layering in the occipital horns. Endocrine following for hyperglycemia.    Uncontrolled DM2   FS goal 140-180, above goal   FS q6h  Currently on tube feed Glucerna at goal 65ml/hr  Patient currently on Prednisone 15 + 5 (please notify endo team of any changes)  Recommend NPH 44 Units for (6A, 12P), NPH 36 units for (6P) and NPH 40 units for midnight 12A. Variable glucoses may have to do with BID dosing of prednisone of 15 mg and 5 mg as biological half life is ~ 18 hrs.   Recommend continue mod correctional scale q6h  Hold NPH if TFs are held and notify endocrine team of any change in nutritional status   Discharge on basal/bolus insulin, dose to be determined   Discussed with NS team    Hypothyroidism   5/30 TSH 12.3  6/4 FT4 2.7  Recommend Synthroid 120 mcg IV qpm     HTN   Will defer management to NSCU team   continue metoprolol and norvasc    HLD   Continue statin     Spoke with NSCU team   Huseyin Traore MD, Endocrine Fellow   Pager  from 9-5PM. After hours and on weekends please call [phone]

## 2020-06-07 NOTE — PROGRESS NOTE ADULT - ASSESSMENT
ASSESSMENT/PLAN:  left sided ICH with IVH. GOC in progress-family mtg scheduled for tomorrow  ?PEG/TRach  cont meropenem for ESBL    [x] Patient is at high risk of neurologic deterioration/death due to: brain compression    Time seen:  Time spent: _30_ [x] critical care minutes

## 2020-06-07 NOTE — PROGRESS NOTE ADULT - SUBJECTIVE AND OBJECTIVE BOX
SUMMARY:    Daytime Events:     T(C): 36.8 (06-07-20 @ 22:00), Max: 37.4 (06-07-20 @ 03:00)  HR: 109 (06-07-20 @ 22:00) (78 - 112)  BP: --  RR: 28 (06-07-20 @ 22:00) (15 - 31)  SpO2: 100% (06-07-20 @ 22:00) (98% - 100%)  Wt(kg): --    Physical Exam:  EXAMINATION:  intubated; eyes open to noxious; left gaze preference  pupils 3mm and reactive; not FC  LUE w/d; RUE plegic    IMAGING/DATA: [] Reviewed    acetaminophen    Suspension .. 650 milliGRAM(s) Oral every 6 hours PRN  amLODIPine   Tablet 10 milliGRAM(s) Oral daily  chlorhexidine 0.12% Liquid 15 milliLiter(s) Oral Mucosa every 12 hours  chlorhexidine 4% Liquid 1 Application(s) Topical <User Schedule>  dextrose 40% Gel 15 Gram(s) Oral once PRN  dextrose 5%. 1000 milliLiter(s) IV Continuous <Continuous>  dextrose 50% Injectable 12.5 Gram(s) IV Push once  dextrose 50% Injectable 25 Gram(s) IV Push once  dextrose 50% Injectable 25 Gram(s) IV Push once  enoxaparin Injectable 30 milliGRAM(s) SubCutaneous two times a day  fentaNYL    Injectable 25 MICROGram(s) IV Push every 2 hours PRN  glucagon  Injectable 1 milliGRAM(s) IntraMuscular once PRN  insulin lispro (HumaLOG) corrective regimen sliding scale   SubCutaneous every 6 hours  insulin NPH human recombinant 36 Unit(s) SubCutaneous <User Schedule>  insulin NPH human recombinant 40 Unit(s) SubCutaneous <User Schedule>  insulin NPH human recombinant 44 Unit(s) SubCutaneous <User Schedule>  lacosamide Solution 200 milliGRAM(s) Oral two times a day  levETIRAcetam  Solution 1000 milliGRAM(s) Oral two times a day  levothyroxine Injectable 120 MICROGram(s) IV Push at bedtime  meropenem  IVPB 1000 milliGRAM(s) IV Intermittent every 8 hours  meropenem  IVPB      metoprolol tartrate 25 milliGRAM(s) Oral two times a day  pantoprazole  Injectable 40 milliGRAM(s) IV Push daily  predniSONE   Tablet 5 milliGRAM(s) Oral at bedtime  predniSONE   Tablet 15 milliGRAM(s) Oral <User Schedule>  simvastatin 20 milliGRAM(s) Oral at bedtime  sodium chloride 0.9% lock flush 10 milliLiter(s) IV Push every 1 hour PRN                            9.0    12.55 )-----------( 440      ( 07 Jun 2020 01:02 )             28.3     06-07    144  |  106  |  19  ----------------------------<  101<H>  3.8   |  23  |  0.53    Ca    9.6      07 Jun 2020 01:02  Phos  3.6     06-07  Mg     2.4     06-07        DEVICES:   [] Restraints [] ET tube [] central line [] arterial line [] iraheta [] NGT/OGT [] EVD [] LD [] MABEL/HMV [] Trach [] PEG [] Chest Tube [] other:

## 2020-06-07 NOTE — PROGRESS NOTE ADULT - SUBJECTIVE AND OBJECTIVE BOX
HPI: 64 year old female with PMHx of Hypothyroidism, HTN, DM, COPD/CHRIS, RA (Prednisone), SBO s/p ex lap with lysis of adhesions c/b evisceration, recent hospitalization in 3/2020 with encephalopathy, significantly hypothyroid (, without myxedema coma), small (stable) right sided SDH, UTI, catatonic reaction to VPA (now resolved) and DKA and psychiatric issues, 5/2020 hospitalization for GIB, Emphysematous cystitis, endometritis, Sigmoid colitis, morbid obesity, functional quadriplegia. Presented on 5/29 with altered mental status. Patient was confused, didn't recognize son. Also found to be gasping for air, ?seizure.      SUBJECTIVE: Pt seen and examined.     OVERNIGHT EVENTS:  no issues     ICU Vital Signs Last 24 Hrs  T(C): 37.4 (06 Jun 2020 07:00), Max: 38 (05 Jun 2020 11:00)  T(F): 99.3 (06 Jun 2020 07:00), Max: 100.4 (05 Jun 2020 11:00)  HR: 109 (06 Jun 2020 08:54) (102 - 126)  BP: 124/78 (06 Jun 2020 08:00) (124/78 - 124/78)  BP(mean): 90 (06 Jun 2020 08:00) (90 - 90)  ABP: 121/64 (06 Jun 2020 08:00) (113/67 - 141/73)  ABP(mean): 87 (06 Jun 2020 08:00) (83 - 99)  RR: 19 (06 Jun 2020 08:00) (16 - 27)  SpO2: 99% (06 Jun 2020 08:54) (96% - 100%)      06-05-20 @ 07:01  -  06-06-20 @ 07:00  --------------------------------------------------------  IN: 2410 mL / OUT: 1550 mL / NET: 860 mL    06-06-20 @ 07:01  -  06-06-20 @ 10:43  --------------------------------------------------------  IN: 130 mL / OUT: 0 mL / NET: 130 mL        Mode: AC/ CMV (Assist Control/ Continuous Mandatory Ventilation), RR (machine): 14, TV (machine): 450, FiO2: 30, PEEP: 5, ITime: 0.8, MAP: 12, PIP: 28    acetaminophen    Suspension .. 650 milliGRAM(s) Oral every 6 hours PRN  amLODIPine   Tablet 10 milliGRAM(s) Oral daily  cefTRIAXone   IVPB      cefTRIAXone   IVPB 1000 milliGRAM(s) IV Intermittent every 24 hours  chlorhexidine 0.12% Liquid 15 milliLiter(s) Oral Mucosa every 12 hours  chlorhexidine 4% Liquid 1 Application(s) Topical <User Schedule>  dextrose 40% Gel 15 Gram(s) Oral once PRN  dextrose 5%. 1000 milliLiter(s) (50 mL/Hr) IV Continuous <Continuous>  dextrose 50% Injectable 12.5 Gram(s) IV Push once  dextrose 50% Injectable 25 Gram(s) IV Push once  dextrose 50% Injectable 25 Gram(s) IV Push once  enoxaparin Injectable 30 milliGRAM(s) SubCutaneous two times a day  fentaNYL    Injectable 25 MICROGram(s) IV Push every 2 hours PRN  glucagon  Injectable 1 milliGRAM(s) IntraMuscular once PRN  insulin lispro (HumaLOG) corrective regimen sliding scale   SubCutaneous every 6 hours  insulin NPH human recombinant 40 Unit(s) SubCutaneous every 6 hours  lacosamide Solution 200 milliGRAM(s) Oral two times a day  levETIRAcetam  Solution 1000 milliGRAM(s) Oral two times a day  levothyroxine Injectable 120 MICROGram(s) IV Push at bedtime  metoprolol tartrate 12.5 milliGRAM(s) Oral two times a day  pantoprazole  Injectable 40 milliGRAM(s) IV Push daily  predniSONE   Tablet 5 milliGRAM(s) Oral at bedtime  predniSONE   Tablet 15 milliGRAM(s) Oral <User Schedule>  simvastatin 20 milliGRAM(s) Oral at bedtime  sodium chloride 0.9% lock flush 10 milliLiter(s) IV Push every 1 hour PRN      LABS:  Na: 139 (06-06 @ 06:00), 142 (06-04 @ 21:48), 145 (06-03 @ 22:15)  K: 4.3 (06-06 @ 06:00), 4.3 (06-04 @ 21:48), 3.7 (06-03 @ 22:15)  Cl: 103 (06-06 @ 06:00), 105 (06-04 @ 21:48), 106 (06-03 @ 22:15)  CO2: 20 (06-06 @ 06:00), 22 (06-04 @ 21:48), 25 (06-03 @ 22:15)  BUN: 19 (06-06 @ 06:00), 18 (06-04 @ 21:48), 16 (06-03 @ 22:15)  Cr: 0.58 (06-06 @ 06:00), 0.67 (06-04 @ 21:48), 0.63 (06-03 @ 22:15)  Glu: 245(06-06 @ 06:00), 217(06-04 @ 21:48), 93(06-03 @ 22:15)    Hgb: 6.8 (06-06 @ 06:00), 7.3 (06-04 @ 21:48), 7.8 (06-03 @ 22:15)  Hct: 22.8 (06-06 @ 06:00), 24.7 (06-04 @ 21:48), 25.6 (06-03 @ 22:15)  WBC: 13.28 (06-06 @ 06:00), 13.00 (06-04 @ 21:48), 13.73 (06-03 @ 22:15)  Plt: 451 (06-06 @ 06:00), 435 (06-04 @ 21:48), 391 (06-03 @ 22:15)      General: No Acute Distress   Neurological: Intubated, pupils 3mm and reactive, +corneal reflexes, +cough/gag, +overbreathing vent set rate, no command following, left gaze preference, RUE 0/5, LUE WD,  BLE TF  Pulmonary: Clear to Auscultation, No Rales, No Rhonchi, No Wheezes   Cardiovascular: S1, S2, Regular Rate and Rhythm   Gastrointestinal: Soft, Nontender, Nondistended     MEDICATIONS:   Antibiotics:  cefTRIAXone   IVPB      cefTRIAXone   IVPB 1000 milliGRAM(s) IV Intermittent every 24 hours    Neuro:  acetaminophen    Suspension .. 650 milliGRAM(s) Oral every 6 hours PRN Temp greater or equal to 38C (100.4F), Mild Pain (1 - 3)  fentaNYL    Injectable 25 MICROGram(s) IV Push every 2 hours PRN Severe Pain (7 - 10)  lacosamide Solution 200 milliGRAM(s) Oral two times a day  levETIRAcetam  Solution 1000 milliGRAM(s) Oral two times a day    Anticoagulation:  enoxaparin Injectable 40 milliGRAM(s) SubCutaneous <User Schedule>    Cardiology:  amLODIPine   Tablet 10 milliGRAM(s) Oral daily  metoprolol tartrate 12.5 milliGRAM(s) Oral two times a day  simvastatin 20 milliGRAM(s) Oral at bedtime    Endo:   insulin lispro (HumaLOG) corrective regimen sliding scale   SubCutaneous every 6 hours  insulin NPH human recombinant 40 Unit(s) SubCutaneous <User Schedule>  insulin NPH human recombinant 46 Unit(s) SubCutaneous <User Schedule>  levothyroxine Injectable 120 MICROGram(s) IV Push at bedtime  predniSONE   Tablet 5 milliGRAM(s) Oral at bedtime  predniSONE   Tablet 15 milliGRAM(s) Oral <User Schedule>    Pulm:    GI/:  pantoprazole  Injectable 40 milliGRAM(s) IV Push daily    Other:  chlorhexidine 0.12% Liquid 15 milliLiter(s) Oral Mucosa every 12 hours  chlorhexidine 4% Liquid 1 Application(s) Topical <User Schedule>  dextrose 5%. 1000 milliLiter(s) IV Continuous <Continuous>  sodium chloride 0.9% lock flush 10 milliLiter(s) IV Push every 1 hour PRN Pre/post blood products, medications, blood draw, and to maintain line patency

## 2020-06-07 NOTE — PROGRESS NOTE ADULT - SUBJECTIVE AND OBJECTIVE BOX
Chief Complaint:     History: Intubated and sedated.     MEDICATIONS  (STANDING):  amLODIPine   Tablet 10 milliGRAM(s) Oral daily  chlorhexidine 0.12% Liquid 15 milliLiter(s) Oral Mucosa every 12 hours  chlorhexidine 4% Liquid 1 Application(s) Topical <User Schedule>  dextrose 5%. 1000 milliLiter(s) (50 mL/Hr) IV Continuous <Continuous>  dextrose 50% Injectable 12.5 Gram(s) IV Push once  dextrose 50% Injectable 25 Gram(s) IV Push once  dextrose 50% Injectable 25 Gram(s) IV Push once  enoxaparin Injectable 30 milliGRAM(s) SubCutaneous two times a day  insulin lispro (HumaLOG) corrective regimen sliding scale   SubCutaneous every 6 hours  insulin NPH human recombinant 40 Unit(s) SubCutaneous <User Schedule>  insulin NPH human recombinant 44 Unit(s) SubCutaneous <User Schedule>  lacosamide Solution 200 milliGRAM(s) Oral two times a day  levETIRAcetam  Solution 1000 milliGRAM(s) Oral two times a day  levothyroxine Injectable 120 MICROGram(s) IV Push at bedtime  meropenem  IVPB 1000 milliGRAM(s) IV Intermittent every 8 hours  meropenem  IVPB      metoprolol tartrate 25 milliGRAM(s) Oral two times a day  pantoprazole  Injectable 40 milliGRAM(s) IV Push daily  predniSONE   Tablet 5 milliGRAM(s) Oral at bedtime  predniSONE   Tablet 15 milliGRAM(s) Oral <User Schedule>  simvastatin 20 milliGRAM(s) Oral at bedtime    MEDICATIONS  (PRN):  acetaminophen    Suspension .. 650 milliGRAM(s) Oral every 6 hours PRN Temp greater or equal to 38C (100.4F), Mild Pain (1 - 3)  dextrose 40% Gel 15 Gram(s) Oral once PRN Blood Glucose LESS THAN 70 milliGRAM(s)/deciliter  fentaNYL    Injectable 25 MICROGram(s) IV Push every 2 hours PRN Severe Pain (7 - 10)  glucagon  Injectable 1 milliGRAM(s) IntraMuscular once PRN Glucose LESS THAN 70 milligrams/deciliter  sodium chloride 0.9% lock flush 10 milliLiter(s) IV Push every 1 hour PRN Pre/post blood products, medications, blood draw, and to maintain line patency      Allergies    Depakote (Other)  Seroquel (Other (Severe))    Intolerances        ROS: Unable to obtain     PHYSICAL EXAM:  VITALS: T(C): 37.3 (06-07-20 @ 11:00)  T(F): 99.1 (06-07-20 @ 11:00), Max: 99.3 (06-07-20 @ 03:00)  HR: 110 (06-07-20 @ 13:00) (78 - 116)  BP: 139/76 (06-06-20 @ 19:00) (139/76 - 150/85)  RR:  (15 - 31)  SpO2:  (98% - 100%)  Wt(kg): --  GENERAL: NAD, well-groomed, well-developed  RESPIRATORY: Clear to auscultation bilaterally; No rales, rhonchi, wheezing, or rubs INtubated and sedated.   CARDIOVASCULAR: Regular rate and rhythm; No murmurs  NEURO: AOx0    POCT Blood Glucose.: 231 mg/dL (06-07-20 @ 11:35)N44+4  POCT Blood Glucose.: 174 mg/dL (06-07-20 @ 05:52)N44+2  POCT Blood Glucose.: 147 mg/dL (06-07-20 @ 02:46)  POCT Blood Glucose.: 66 mg/dL (06-07-20 @ 00:54)  POCT Blood Glucose.: 94 mg/dL (06-07-20 @ 00:28)  POCT Blood Glucose.: 77 mg/dL (06-07-20 @ 00:27)N0  POCT Blood Glucose.: 174 mg/dL (06-06-20 @ 17:04)N40+2  POCT Blood Glucose.: 288 mg/dL (06-06-20 @ 11:16)N40+6  POCT Blood Glucose.: 229 mg/dL (06-06-20 @ 05:09)N40+4  POCT Blood Glucose.: 92 mg/dL (06-05-20 @ 23:19)N40+0  POCT Blood Glucose.: 92 mg/dL (06-05-20 @ 23:18)  POCT Blood Glucose.: 187 mg/dL (06-05-20 @ 17:00)+2  POCT Blood Glucose.: 113 mg/dL (06-05-20 @ 11:09)N44+0  POCT Blood Glucose.: 285 mg/dL (06-05-20 @ 05:04)N46+6    06-07    144  |  106  |  19  ----------------------------<  101<H>  3.8   |  23  |  0.53    EGFR if : 118  EGFR if non : 102    Ca    9.6      06-07  Mg     2.4     06-07  Phos  3.6     06-07            Thyroid Function Tests:  06-04 @ 01:58 TSH -- FreeT4 2.7 T3 -- Anti TPO -- Anti Thyroglobulin Ab -- TSI --  05-30 @ 04:44 TSH 12.30 FreeT4 -- T3 51 Anti TPO -- Anti Thyroglobulin Ab -- TSI -- Chief Complaint: T2DM     History: Intubated and sedated. Hypoglycemic at MN yesterday, TF not held.     MEDICATIONS  (STANDING):  amLODIPine   Tablet 10 milliGRAM(s) Oral daily  chlorhexidine 0.12% Liquid 15 milliLiter(s) Oral Mucosa every 12 hours  chlorhexidine 4% Liquid 1 Application(s) Topical <User Schedule>  dextrose 5%. 1000 milliLiter(s) (50 mL/Hr) IV Continuous <Continuous>  dextrose 50% Injectable 12.5 Gram(s) IV Push once  dextrose 50% Injectable 25 Gram(s) IV Push once  dextrose 50% Injectable 25 Gram(s) IV Push once  enoxaparin Injectable 30 milliGRAM(s) SubCutaneous two times a day  insulin lispro (HumaLOG) corrective regimen sliding scale   SubCutaneous every 6 hours  insulin NPH human recombinant 40 Unit(s) SubCutaneous <User Schedule>  insulin NPH human recombinant 44 Unit(s) SubCutaneous <User Schedule>  lacosamide Solution 200 milliGRAM(s) Oral two times a day  levETIRAcetam  Solution 1000 milliGRAM(s) Oral two times a day  levothyroxine Injectable 120 MICROGram(s) IV Push at bedtime  meropenem  IVPB 1000 milliGRAM(s) IV Intermittent every 8 hours  meropenem  IVPB      metoprolol tartrate 25 milliGRAM(s) Oral two times a day  pantoprazole  Injectable 40 milliGRAM(s) IV Push daily  predniSONE   Tablet 5 milliGRAM(s) Oral at bedtime  predniSONE   Tablet 15 milliGRAM(s) Oral <User Schedule>  simvastatin 20 milliGRAM(s) Oral at bedtime    MEDICATIONS  (PRN):  acetaminophen    Suspension .. 650 milliGRAM(s) Oral every 6 hours PRN Temp greater or equal to 38C (100.4F), Mild Pain (1 - 3)  dextrose 40% Gel 15 Gram(s) Oral once PRN Blood Glucose LESS THAN 70 milliGRAM(s)/deciliter  fentaNYL    Injectable 25 MICROGram(s) IV Push every 2 hours PRN Severe Pain (7 - 10)  glucagon  Injectable 1 milliGRAM(s) IntraMuscular once PRN Glucose LESS THAN 70 milligrams/deciliter  sodium chloride 0.9% lock flush 10 milliLiter(s) IV Push every 1 hour PRN Pre/post blood products, medications, blood draw, and to maintain line patency      Allergies    Depakote (Other)  Seroquel (Other (Severe))    Intolerances        ROS: Unable to obtain     PHYSICAL EXAM:  VITALS: T(C): 37.3 (06-07-20 @ 11:00)  T(F): 99.1 (06-07-20 @ 11:00), Max: 99.3 (06-07-20 @ 03:00)  HR: 110 (06-07-20 @ 13:00) (78 - 116)  BP: 139/76 (06-06-20 @ 19:00) (139/76 - 150/85)  RR:  (15 - 31)  SpO2:  (98% - 100%)  Wt(kg): --  GENERAL: NAD, well-groomed, well-developed  RESPIRATORY: Clear to auscultation bilaterally; No rales, rhonchi, wheezing, or rubs INtubated and sedated.   CARDIOVASCULAR: Regular rate and rhythm; No murmurs  NEURO: AOx0    POCT Blood Glucose.: 231 mg/dL (06-07-20 @ 11:35)N44+4  POCT Blood Glucose.: 174 mg/dL (06-07-20 @ 05:52)N44+2  POCT Blood Glucose.: 147 mg/dL (06-07-20 @ 02:46)  POCT Blood Glucose.: 66 mg/dL (06-07-20 @ 00:54)  POCT Blood Glucose.: 94 mg/dL (06-07-20 @ 00:28)  POCT Blood Glucose.: 77 mg/dL (06-07-20 @ 00:27)N0  POCT Blood Glucose.: 174 mg/dL (06-06-20 @ 17:04)N40+2  POCT Blood Glucose.: 288 mg/dL (06-06-20 @ 11:16)N40+6  POCT Blood Glucose.: 229 mg/dL (06-06-20 @ 05:09)N40+4  POCT Blood Glucose.: 92 mg/dL (06-05-20 @ 23:19)N40+0  POCT Blood Glucose.: 92 mg/dL (06-05-20 @ 23:18)  POCT Blood Glucose.: 187 mg/dL (06-05-20 @ 17:00)+2  POCT Blood Glucose.: 113 mg/dL (06-05-20 @ 11:09)N44+0  POCT Blood Glucose.: 285 mg/dL (06-05-20 @ 05:04)N46+6    06-07    144  |  106  |  19  ----------------------------<  101<H>  3.8   |  23  |  0.53    EGFR if : 118  EGFR if non : 102    Ca    9.6      06-07  Mg     2.4     06-07  Phos  3.6     06-07            Thyroid Function Tests:  06-04 @ 01:58 TSH -- FreeT4 2.7 T3 -- Anti TPO -- Anti Thyroglobulin Ab -- TSI --  05-30 @ 04:44 TSH 12.30 FreeT4 -- T3 51 Anti TPO -- Anti Thyroglobulin Ab -- TSI --

## 2020-06-08 LAB
ANION GAP SERPL CALC-SCNC: 12 MMOL/L — SIGNIFICANT CHANGE UP (ref 5–17)
ANION GAP SERPL CALC-SCNC: 14 MMOL/L — SIGNIFICANT CHANGE UP (ref 5–17)
BUN SERPL-MCNC: 19 MG/DL — SIGNIFICANT CHANGE UP (ref 7–23)
BUN SERPL-MCNC: 20 MG/DL — SIGNIFICANT CHANGE UP (ref 7–23)
CALCIUM SERPL-MCNC: 9.3 MG/DL — SIGNIFICANT CHANGE UP (ref 8.4–10.5)
CALCIUM SERPL-MCNC: 9.7 MG/DL — SIGNIFICANT CHANGE UP (ref 8.4–10.5)
CHLORIDE SERPL-SCNC: 104 MMOL/L — SIGNIFICANT CHANGE UP (ref 96–108)
CHLORIDE SERPL-SCNC: 109 MMOL/L — HIGH (ref 96–108)
CO2 SERPL-SCNC: 22 MMOL/L — SIGNIFICANT CHANGE UP (ref 22–31)
CO2 SERPL-SCNC: 24 MMOL/L — SIGNIFICANT CHANGE UP (ref 22–31)
CREAT SERPL-MCNC: 0.51 MG/DL — SIGNIFICANT CHANGE UP (ref 0.5–1.3)
CREAT SERPL-MCNC: 0.52 MG/DL — SIGNIFICANT CHANGE UP (ref 0.5–1.3)
CULTURE RESULTS: SIGNIFICANT CHANGE UP
CULTURE RESULTS: SIGNIFICANT CHANGE UP
GAS PNL BLDA: SIGNIFICANT CHANGE UP
GLUCOSE BLDC GLUCOMTR-MCNC: 111 MG/DL — HIGH (ref 70–99)
GLUCOSE BLDC GLUCOMTR-MCNC: 167 MG/DL — HIGH (ref 70–99)
GLUCOSE BLDC GLUCOMTR-MCNC: 210 MG/DL — HIGH (ref 70–99)
GLUCOSE BLDC GLUCOMTR-MCNC: 295 MG/DL — HIGH (ref 70–99)
GLUCOSE SERPL-MCNC: 128 MG/DL — HIGH (ref 70–99)
GLUCOSE SERPL-MCNC: 210 MG/DL — HIGH (ref 70–99)
HCT VFR BLD CALC: 30 % — LOW (ref 34.5–45)
HCT VFR BLD CALC: 33.8 % — LOW (ref 34.5–45)
HGB BLD-MCNC: 10.4 G/DL — LOW (ref 11.5–15.5)
HGB BLD-MCNC: 9.2 G/DL — LOW (ref 11.5–15.5)
MAGNESIUM SERPL-MCNC: 2 MG/DL — SIGNIFICANT CHANGE UP (ref 1.6–2.6)
MAGNESIUM SERPL-MCNC: 2.1 MG/DL — SIGNIFICANT CHANGE UP (ref 1.6–2.6)
MCHC RBC-ENTMCNC: 29 PG — SIGNIFICANT CHANGE UP (ref 27–34)
MCHC RBC-ENTMCNC: 29.1 PG — SIGNIFICANT CHANGE UP (ref 27–34)
MCHC RBC-ENTMCNC: 30.7 GM/DL — LOW (ref 32–36)
MCHC RBC-ENTMCNC: 30.8 GM/DL — LOW (ref 32–36)
MCV RBC AUTO: 94.2 FL — SIGNIFICANT CHANGE UP (ref 80–100)
MCV RBC AUTO: 94.9 FL — SIGNIFICANT CHANGE UP (ref 80–100)
NRBC # BLD: 0 /100 WBCS — SIGNIFICANT CHANGE UP (ref 0–0)
NRBC # BLD: 0 /100 WBCS — SIGNIFICANT CHANGE UP (ref 0–0)
PHOSPHATE SERPL-MCNC: 2.6 MG/DL — SIGNIFICANT CHANGE UP (ref 2.5–4.5)
PHOSPHATE SERPL-MCNC: 2.7 MG/DL — SIGNIFICANT CHANGE UP (ref 2.5–4.5)
PLATELET # BLD AUTO: 457 K/UL — HIGH (ref 150–400)
PLATELET # BLD AUTO: 482 K/UL — HIGH (ref 150–400)
POTASSIUM SERPL-MCNC: 3.9 MMOL/L — SIGNIFICANT CHANGE UP (ref 3.5–5.3)
POTASSIUM SERPL-MCNC: 4.2 MMOL/L — SIGNIFICANT CHANGE UP (ref 3.5–5.3)
POTASSIUM SERPL-SCNC: 3.9 MMOL/L — SIGNIFICANT CHANGE UP (ref 3.5–5.3)
POTASSIUM SERPL-SCNC: 4.2 MMOL/L — SIGNIFICANT CHANGE UP (ref 3.5–5.3)
RBC # BLD: 3.16 M/UL — LOW (ref 3.8–5.2)
RBC # BLD: 3.59 M/UL — LOW (ref 3.8–5.2)
RBC # FLD: 19.9 % — HIGH (ref 10.3–14.5)
RBC # FLD: 20.5 % — HIGH (ref 10.3–14.5)
SODIUM SERPL-SCNC: 142 MMOL/L — SIGNIFICANT CHANGE UP (ref 135–145)
SODIUM SERPL-SCNC: 143 MMOL/L — SIGNIFICANT CHANGE UP (ref 135–145)
SPECIMEN SOURCE: SIGNIFICANT CHANGE UP
SPECIMEN SOURCE: SIGNIFICANT CHANGE UP
WBC # BLD: 11.05 K/UL — HIGH (ref 3.8–10.5)
WBC # BLD: 11.4 K/UL — HIGH (ref 3.8–10.5)
WBC # FLD AUTO: 11.05 K/UL — HIGH (ref 3.8–10.5)
WBC # FLD AUTO: 11.4 K/UL — HIGH (ref 3.8–10.5)

## 2020-06-08 PROCEDURE — 99232 SBSQ HOSP IP/OBS MODERATE 35: CPT

## 2020-06-08 PROCEDURE — 99497 ADVNCD CARE PLAN 30 MIN: CPT | Mod: 25

## 2020-06-08 PROCEDURE — 99292 CRITICAL CARE ADDL 30 MIN: CPT

## 2020-06-08 PROCEDURE — 99233 SBSQ HOSP IP/OBS HIGH 50: CPT | Mod: 25

## 2020-06-08 PROCEDURE — 99291 CRITICAL CARE FIRST HOUR: CPT

## 2020-06-08 PROCEDURE — 71045 X-RAY EXAM CHEST 1 VIEW: CPT | Mod: 26

## 2020-06-08 RX ORDER — HUMAN INSULIN 100 [IU]/ML
30 INJECTION, SUSPENSION SUBCUTANEOUS
Refills: 0 | Status: DISCONTINUED | OUTPATIENT
Start: 2020-06-08 | End: 2020-06-09

## 2020-06-08 RX ORDER — SODIUM CHLORIDE 9 MG/ML
500 INJECTION INTRAMUSCULAR; INTRAVENOUS; SUBCUTANEOUS ONCE
Refills: 0 | Status: COMPLETED | OUTPATIENT
Start: 2020-06-08 | End: 2020-06-08

## 2020-06-08 RX ORDER — HUMAN INSULIN 100 [IU]/ML
40 INJECTION, SUSPENSION SUBCUTANEOUS
Refills: 0 | Status: DISCONTINUED | OUTPATIENT
Start: 2020-06-08 | End: 2020-06-14

## 2020-06-08 RX ORDER — HUMAN INSULIN 100 [IU]/ML
48 INJECTION, SUSPENSION SUBCUTANEOUS
Refills: 0 | Status: DISCONTINUED | OUTPATIENT
Start: 2020-06-08 | End: 2020-06-09

## 2020-06-08 RX ORDER — METOPROLOL TARTRATE 50 MG
5 TABLET ORAL ONCE
Refills: 0 | Status: COMPLETED | OUTPATIENT
Start: 2020-06-08 | End: 2020-06-08

## 2020-06-08 RX ORDER — ERTAPENEM SODIUM 1 G/1
1000 INJECTION, POWDER, LYOPHILIZED, FOR SOLUTION INTRAMUSCULAR; INTRAVENOUS
Refills: 0 | Status: COMPLETED | OUTPATIENT
Start: 2020-06-08 | End: 2020-06-13

## 2020-06-08 RX ADMIN — Medication 6: at 12:49

## 2020-06-08 RX ADMIN — LACOSAMIDE 200 MILLIGRAM(S): 50 TABLET ORAL at 18:16

## 2020-06-08 RX ADMIN — LEVETIRACETAM 1000 MILLIGRAM(S): 250 TABLET, FILM COATED ORAL at 05:10

## 2020-06-08 RX ADMIN — ERTAPENEM SODIUM 120 MILLIGRAM(S): 1 INJECTION, POWDER, LYOPHILIZED, FOR SOLUTION INTRAMUSCULAR; INTRAVENOUS at 13:01

## 2020-06-08 RX ADMIN — CHLORHEXIDINE GLUCONATE 15 MILLILITER(S): 213 SOLUTION TOPICAL at 05:09

## 2020-06-08 RX ADMIN — PANTOPRAZOLE SODIUM 40 MILLIGRAM(S): 20 TABLET, DELAYED RELEASE ORAL at 12:52

## 2020-06-08 RX ADMIN — AMLODIPINE BESYLATE 10 MILLIGRAM(S): 2.5 TABLET ORAL at 05:10

## 2020-06-08 RX ADMIN — HUMAN INSULIN 30 UNIT(S): 100 INJECTION, SUSPENSION SUBCUTANEOUS at 19:04

## 2020-06-08 RX ADMIN — HUMAN INSULIN 48 UNIT(S): 100 INJECTION, SUSPENSION SUBCUTANEOUS at 18:16

## 2020-06-08 RX ADMIN — Medication 25 MILLIGRAM(S): at 05:11

## 2020-06-08 RX ADMIN — Medication 120 MICROGRAM(S): at 21:17

## 2020-06-08 RX ADMIN — CHLORHEXIDINE GLUCONATE 1 APPLICATION(S): 213 SOLUTION TOPICAL at 21:16

## 2020-06-08 RX ADMIN — ENOXAPARIN SODIUM 30 MILLIGRAM(S): 100 INJECTION SUBCUTANEOUS at 19:04

## 2020-06-08 RX ADMIN — HUMAN INSULIN 44 UNIT(S): 100 INJECTION, SUSPENSION SUBCUTANEOUS at 12:51

## 2020-06-08 RX ADMIN — HUMAN INSULIN 40 UNIT(S): 100 INJECTION, SUSPENSION SUBCUTANEOUS at 23:54

## 2020-06-08 RX ADMIN — HUMAN INSULIN 48 UNIT(S): 100 INJECTION, SUSPENSION SUBCUTANEOUS at 18:15

## 2020-06-08 RX ADMIN — SODIUM CHLORIDE 1000 MILLILITER(S): 9 INJECTION INTRAMUSCULAR; INTRAVENOUS; SUBCUTANEOUS at 03:18

## 2020-06-08 RX ADMIN — CHLORHEXIDINE GLUCONATE 15 MILLILITER(S): 213 SOLUTION TOPICAL at 19:03

## 2020-06-08 RX ADMIN — MEROPENEM 100 MILLIGRAM(S): 1 INJECTION INTRAVENOUS at 05:10

## 2020-06-08 RX ADMIN — Medication 5 MILLIGRAM(S): at 14:57

## 2020-06-08 RX ADMIN — Medication 15 MILLIGRAM(S): at 05:11

## 2020-06-08 RX ADMIN — LACOSAMIDE 200 MILLIGRAM(S): 50 TABLET ORAL at 05:10

## 2020-06-08 RX ADMIN — Medication 25 MILLIGRAM(S): at 18:17

## 2020-06-08 RX ADMIN — LEVETIRACETAM 1000 MILLIGRAM(S): 250 TABLET, FILM COATED ORAL at 18:16

## 2020-06-08 RX ADMIN — HUMAN INSULIN 44 UNIT(S): 100 INJECTION, SUSPENSION SUBCUTANEOUS at 05:07

## 2020-06-08 RX ADMIN — Medication 5 MILLIGRAM(S): at 21:17

## 2020-06-08 RX ADMIN — SIMVASTATIN 20 MILLIGRAM(S): 20 TABLET, FILM COATED ORAL at 21:17

## 2020-06-08 RX ADMIN — FENTANYL CITRATE 25 MICROGRAM(S): 50 INJECTION INTRAVENOUS at 14:37

## 2020-06-08 RX ADMIN — ENOXAPARIN SODIUM 30 MILLIGRAM(S): 100 INJECTION SUBCUTANEOUS at 05:10

## 2020-06-08 RX ADMIN — Medication 4: at 05:08

## 2020-06-08 NOTE — PROGRESS NOTE ADULT - SUBJECTIVE AND OBJECTIVE BOX
65 yo F with AMS since this morning.  Pt. was confused, didn't recognize son this morning.  He notes she was sleeping more than usual yesterday, which tipped off son that something was wrong.  At 11 am pt. was gasping for air like she was seizing. 2 hours ago pt. was dry heaving, her chest was going up and down and she started shaking like she was having a seizure.  Pt. has no history of seizures per son.  		PMH: hypothyroidism, HTN, IDDM, COPD/CHRIS on home O2, RA (on Prednisone), SBO s/p ex lap with lysis of adhesions c/b evisceration,  recent hospitalization in 3/2020 with encephalopathy, significantly hypothyroid (, without myxedema coma), small (stable) right sided SDH, UTI, catatonic reaction to VPA (now resolved) and DKA and psychiatric issues, 2020 hospitalization for GIB, Emphysematous cystitis, endometritis, Sigmoid colitis, morbid obesity, functional quadriplegia    HCP: Norris Rush, Son [phone]; Daughter, Heavenly Schmidt [phone]; son asks that only him or his sister, Heavenly be contacted for patient updates/information as they are both proxy's for patient (29 May 2020 23:41)    OVERNIGHT EVENTS: none    Vital Signs Last 24 Hrs  T(C): 36.4 (2020 05:00), Max: 37.3 (2020 11:00)  T(F): 97.5 (2020 05:00), Max: 99.1 (2020 11:00)  HR: 114 (2020 05:00) (78 - 118)  BP: --  BP(mean): --  RR: 21 (2020 05:00) (16 - 31)  SpO2: 100% (2020 05:00) (97% - 100%)    I&O's Detail    2020 07:01  -  2020 07:00  --------------------------------------------------------  IN:    Enteral Tube Flush: 335 mL    Glucerna: 1495 mL    IV PiggyBack: 150 mL    Sodium Chloride 0.9% IV Bolus: 500 mL  Total IN: 2480 mL    OUT:    Incontinent per Collection Ba mL    Stool: 1 mL  Total OUT: 1501 mL    Total NET: 979 mL        I&O's Summary    2020 07:01  -  2020 07:00  --------------------------------------------------------  IN: 2480 mL / OUT: 1501 mL / NET: 979 mL        PHYSICAL EXAM:  Neurological:    Motor exam:         [] Upper extremity              Bi(c5)  WE(c6)  EE(c7)   FF(c8)                                                R         5/5        5/5        5/5       5/5                                               L          5/5        5/5        5/5       5/5         [] Lower extremeity          HF(l2)   KE(l3)    TA(l4)   EHL(l5)  GS(s1)                                                 R        5/5        5/5        5/5       5/5         5/5                                               L         5/5        5/5       5/5       5/5          5/5                                                        [] warm well perfused; capillary refill <3 seconds     Sensation: [] intact to light touch  [] decreased:       Cardiovascular:  Respiratory:  Gastrointestinal:  Genitourinary:  Extremities:  Incision/Wound:    TUBES/LINES:  [] CVC  [] A-line  [] Lumbar Drain  [] Ventriculostomy  [] Other    DIET:  [] NPO  [] Mechanical  [] Tube feeds    LABS:                        10.4   11.05 )-----------( 457      ( 2020 01:55 )             33.8     06-08    142  |  104  |  19  ----------------------------<  210<H>  4.2   |  24  |  0.52    Ca    9.7      2020 01:55  Phos  2.7     06-08  Mg     2.1     06-08              CAPILLARY BLOOD GLUCOSE      POCT Blood Glucose.: 210 mg/dL (2020 05:07)  POCT Blood Glucose.: 199 mg/dL (2020 23:37)  POCT Blood Glucose.: 231 mg/dL (2020 17:01)  POCT Blood Glucose.: 231 mg/dL (2020 11:35)      Drug Levels: [] N/A    CSF Analysis: [] N/A      Allergies    Depakote (Other)  Seroquel (Other (Severe))    Intolerances      MEDICATIONS:  Antibiotics:  meropenem  IVPB 1000 milliGRAM(s) IV Intermittent every 8 hours  meropenem  IVPB        Neuro:  acetaminophen    Suspension .. 650 milliGRAM(s) Oral every 6 hours PRN  fentaNYL    Injectable 25 MICROGram(s) IV Push every 2 hours PRN  lacosamide Solution 200 milliGRAM(s) Oral two times a day  levETIRAcetam  Solution 1000 milliGRAM(s) Oral two times a day    Anticoagulation:  enoxaparin Injectable 30 milliGRAM(s) SubCutaneous two times a day    OTHER:  amLODIPine   Tablet 10 milliGRAM(s) Oral daily  chlorhexidine 0.12% Liquid 15 milliLiter(s) Oral Mucosa every 12 hours  chlorhexidine 4% Liquid 1 Application(s) Topical <User Schedule>  dextrose 40% Gel 15 Gram(s) Oral once PRN  dextrose 50% Injectable 12.5 Gram(s) IV Push once  dextrose 50% Injectable 25 Gram(s) IV Push once  dextrose 50% Injectable 25 Gram(s) IV Push once  glucagon  Injectable 1 milliGRAM(s) IntraMuscular once PRN  insulin lispro (HumaLOG) corrective regimen sliding scale   SubCutaneous every 6 hours  insulin NPH human recombinant 36 Unit(s) SubCutaneous <User Schedule>  insulin NPH human recombinant 40 Unit(s) SubCutaneous <User Schedule>  insulin NPH human recombinant 44 Unit(s) SubCutaneous <User Schedule>  levothyroxine Injectable 120 MICROGram(s) IV Push at bedtime  metoprolol tartrate 25 milliGRAM(s) Oral two times a day  pantoprazole  Injectable 40 milliGRAM(s) IV Push daily  predniSONE   Tablet 5 milliGRAM(s) Oral at bedtime  predniSONE   Tablet 15 milliGRAM(s) Oral <User Schedule>  simvastatin 20 milliGRAM(s) Oral at bedtime    IVF:  dextrose 5%. 1000 milliLiter(s) IV Continuous <Continuous>    CULTURES:  Culture Results:   >100,000 CFU/ml Escherichia coli ESBL ( @ 01:09)  Culture Results:   No growth to date. ( @ 15:00)    RADIOLOGY & ADDITIONAL TESTS: 65 yo F with AMS since this morning.  Pt. was confused, didn't recognize son this morning.  He notes she was sleeping more than usual yesterday, which tipped off son that something was wrong.  At 11 am pt. was gasping for air like she was seizing. 2 hours ago pt. was dry heaving, her chest was going up and down and she started shaking like she was having a seizure.  Pt. has no history of seizures per son.  		PMH: hypothyroidism, HTN, IDDM, COPD/CHRIS on home O2, RA (on Prednisone), SBO s/p ex lap with lysis of adhesions c/b evisceration,  recent hospitalization in 3/2020 with encephalopathy, significantly hypothyroid (, without myxedema coma), small (stable) right sided SDH, UTI, catatonic reaction to VPA (now resolved) and DKA and psychiatric issues, 5/2020 hospitalization for GIB, Emphysematous cystitis, endometritis, Sigmoid colitis, morbid obesity, functional quadriplegia    HCP: Norris Rush, Son [phone]; Daughter, Heavenly Schmidt [phone]; son asks that only him or his sister, Heavenly be contacted for patient updates/information as they are both proxy's for patient (29 May 2020 23:41)    OVERNIGHT EVENTS: Afebrile    This am, found to have LUE spasmodic movements. Aborted after holding LUE.       ICU Vital Signs Last 24 Hrs  T(C): 36.4 (08 Jun 2020 05:00), Max: 37.3 (07 Jun 2020 11:00)  T(F): 97.5 (08 Jun 2020 05:00), Max: 99.1 (07 Jun 2020 11:00)  HR: 114 (08 Jun 2020 05:00) (97 - 118  ABP: 134/69 (08 Jun 2020 05:00) (121/67 - 149/75)  ABP(mean): 96 (08 Jun 2020 05:00) (88 - 107)  RR: 21 (08 Jun 2020 05:00) (16 - 31)  SpO2: 100% (08 Jun 2020 05:00) (97% - 100%)      06-07-20 @ 07:01  -  06-08-20 @ 07:00  --------------------------------------------------------  IN: 2480 mL / OUT: 1501 mL / NET: 979 mL      Mode: AC/ CMV (Assist Control/ Continuous Mandatory Ventilation), RR (machine): 14, TV (machine): 450, FiO2: 30, PEEP: 5, ITime: 0.8, MAP: 13, PIP: 21  acetaminophen    Suspension .. 650 milliGRAM(s) Oral every 6 hours PRN  amLODIPine   Tablet 10 milliGRAM(s) Oral daily  chlorhexidine 0.12% Liquid 15 milliLiter(s) Oral Mucosa every 12 hours  chlorhexidine 4% Liquid 1 Application(s) Topical <User Schedule>  dextrose 40% Gel 15 Gram(s) Oral once PRN  dextrose 5%. 1000 milliLiter(s) (50 mL/Hr) IV Continuous <Continuous>  dextrose 50% Injectable 12.5 Gram(s) IV Push once  dextrose 50% Injectable 25 Gram(s) IV Push once  dextrose 50% Injectable 25 Gram(s) IV Push once  enoxaparin Injectable 30 milliGRAM(s) SubCutaneous two times a day  fentaNYL    Injectable 25 MICROGram(s) IV Push every 2 hours PRN  glucagon  Injectable 1 milliGRAM(s) IntraMuscular once PRN  insulin lispro (HumaLOG) corrective regimen sliding scale   SubCutaneous every 6 hours  insulin NPH human recombinant 36 Unit(s) SubCutaneous <User Schedule>  insulin NPH human recombinant 40 Unit(s) SubCutaneous <User Schedule>  insulin NPH human recombinant 44 Unit(s) SubCutaneous <User Schedule>  lacosamide Solution 200 milliGRAM(s) Oral two times a day  levETIRAcetam  Solution 1000 milliGRAM(s) Oral two times a day  levothyroxine Injectable 120 MICROGram(s) IV Push at bedtime  meropenem  IVPB 1000 milliGRAM(s) IV Intermittent every 8 hours  meropenem  IVPB      metoprolol tartrate 25 milliGRAM(s) Oral two times a day  pantoprazole  Injectable 40 milliGRAM(s) IV Push daily  predniSONE   Tablet 5 milliGRAM(s) Oral at bedtime  predniSONE   Tablet 15 milliGRAM(s) Oral <User Schedule>  simvastatin 20 milliGRAM(s) Oral at bedtime  sodium chloride 0.9% lock flush 10 milliLiter(s) IV Push every 1 hour PRN                          10.4   11.05 )-----------( 457      ( 08 Jun 2020 01:55 )             33.8     06-08    142  |  104  |  19  ----------------------------<  210<H>  4.2   |  24  |  0.52    Ca    9.7      08 Jun 2020 01:55  Phos  2.7     06-08  Mg     2.1     06-08        ABG - ( 08 Jun 2020 01:52 )  pH, Arterial: 7.45  pH, Blood: x     /  pCO2: 36    /  pO2: 121   / HCO3: 25    / Base Excess: 1.5   /  SaO2: 99            POCT Blood Glucose.: 210 mg/dL (08 Jun 2020 05:07)  POCT Blood Glucose.: 199 mg/dL (07 Jun 2020 23:37)  POCT Blood Glucose.: 231 mg/dL (07 Jun 2020 17:01)  POCT Blood Glucose.: 231 mg/dL (07 Jun 2020 11:35)        CULTURES:  Culture Results:   >100,000 CFU/ml Escherichia coli ESBL (06-04 @ 01:09)  Culture Results:   No growth to date. (06-03 @ 15:00)      PHYSICAL EXAMINATION:   General: No Acute Distress   Neurological: Intubated, pupils 3mm and reactive, +corneal reflexes, +cough/gag, +overbreathing vent set rate, no command following, left gaze preference, RUE 0/5, LUE WD,  BLE TF  Pulmonary: Clear to Auscultation, No Rales, No Rhonchi, No Wheezes   Cardiovascular: S1, S2, Regular Rate and Rhythm   Gastrointestinal: Soft, Nontender, Nondistended 65 yo F with AMS since this morning.  Pt. was confused, didn't recognize son this morning.  He notes she was sleeping more than usual yesterday, which tipped off son that something was wrong.  At 11 am pt. was gasping for air like she was seizing. 2 hours ago pt. was dry heaving, her chest was going up and down and she started shaking like she was having a seizure.  Pt. has no history of seizures per son.  		PMH: hypothyroidism, HTN, IDDM, COPD/CHRIS on home O2, RA (on Prednisone), SBO s/p ex lap with lysis of adhesions c/b evisceration,  recent hospitalization in 3/2020 with encephalopathy, significantly hypothyroid (, without myxedema coma), small (stable) right sided SDH, UTI, catatonic reaction to VPA (now resolved) and DKA and psychiatric issues, 5/2020 hospitalization for GIB, Emphysematous cystitis, endometritis, Sigmoid colitis, morbid obesity, functional quadriplegia    HCP: Norris Rush, Son [phone]; Daughter, Heavenly Schmidt [phone]; son asks that only him or his sister, Heavenly be contacted for patient updates/information as they are both proxys for patient (29 May 2020 23:41)    OVERNIGHT EVENTS: Afebrile    This am, found to have LUE spasmodic movements. Aborted after holding LUE.     ICU Vital Signs Last 24 Hrs  T(C): 36.4 (08 Jun 2020 05:00), Max: 37.3 (07 Jun 2020 11:00)  T(F): 97.5 (08 Jun 2020 05:00), Max: 99.1 (07 Jun 2020 11:00)  HR: 114 (08 Jun 2020 05:00) (97 - 118  ABP: 134/69 (08 Jun 2020 05:00) (121/67 - 149/75)  ABP(mean): 96 (08 Jun 2020 05:00) (88 - 107)  RR: 21 (08 Jun 2020 05:00) (16 - 31)  SpO2: 100% (08 Jun 2020 05:00) (97% - 100%)      06-07-20 @ 07:01  -  06-08-20 @ 07:00  --------------------------------------------------------  IN: 2480 mL / OUT: 1501 mL / NET: 979 mL      Mode: AC/ CMV (Assist Control/ Continuous Mandatory Ventilation), RR (machine): 14, TV (machine): 450, FiO2: 30, PEEP: 5, ITime: 0.8, MAP: 13, PIP: 21  acetaminophen    Suspension .. 650 milliGRAM(s) Oral every 6 hours PRN  amLODIPine   Tablet 10 milliGRAM(s) Oral daily  chlorhexidine 0.12% Liquid 15 milliLiter(s) Oral Mucosa every 12 hours  chlorhexidine 4% Liquid 1 Application(s) Topical <User Schedule>  dextrose 40% Gel 15 Gram(s) Oral once PRN  dextrose 5%. 1000 milliLiter(s) (50 mL/Hr) IV Continuous <Continuous>  dextrose 50% Injectable 12.5 Gram(s) IV Push once  dextrose 50% Injectable 25 Gram(s) IV Push once  dextrose 50% Injectable 25 Gram(s) IV Push once  enoxaparin Injectable 30 milliGRAM(s) SubCutaneous two times a day  fentaNYL    Injectable 25 MICROGram(s) IV Push every 2 hours PRN  glucagon  Injectable 1 milliGRAM(s) IntraMuscular once PRN  insulin lispro (HumaLOG) corrective regimen sliding scale   SubCutaneous every 6 hours  insulin NPH human recombinant 36 Unit(s) SubCutaneous <User Schedule>  insulin NPH human recombinant 40 Unit(s) SubCutaneous <User Schedule>  insulin NPH human recombinant 44 Unit(s) SubCutaneous <User Schedule>  lacosamide Solution 200 milliGRAM(s) Oral two times a day  levETIRAcetam  Solution 1000 milliGRAM(s) Oral two times a day  levothyroxine Injectable 120 MICROGram(s) IV Push at bedtime  meropenem  IVPB 1000 milliGRAM(s) IV Intermittent every 8 hours  meropenem  IVPB      metoprolol tartrate 25 milliGRAM(s) Oral two times a day  pantoprazole  Injectable 40 milliGRAM(s) IV Push daily  predniSONE   Tablet 5 milliGRAM(s) Oral at bedtime  predniSONE   Tablet 15 milliGRAM(s) Oral <User Schedule>  simvastatin 20 milliGRAM(s) Oral at bedtime  sodium chloride 0.9% lock flush 10 milliLiter(s) IV Push every 1 hour PRN                          10.4   11.05 )-----------( 457      ( 08 Jun 2020 01:55 )             33.8     06-08    142  |  104  |  19  ----------------------------<  210<H>  4.2   |  24  |  0.52    Ca    9.7      08 Jun 2020 01:55  Phos  2.7     06-08  Mg     2.1     06-08        ABG - ( 08 Jun 2020 01:52 )  pH, Arterial: 7.45  pH, Blood: x     /  pCO2: 36    /  pO2: 121   / HCO3: 25    / Base Excess: 1.5   /  SaO2: 99          POCT Blood Glucose.: 210 mg/dL (08 Jun 2020 05:07)  POCT Blood Glucose.: 199 mg/dL (07 Jun 2020 23:37)  POCT Blood Glucose.: 231 mg/dL (07 Jun 2020 17:01)  POCT Blood Glucose.: 231 mg/dL (07 Jun 2020 11:35)      CULTURES:  Culture Results:   >100,000 CFU/ml Escherichia coli ESBL (06-04 @ 01:09)  Culture Results:   No growth to date. (06-03 @ 15:00)      PHYSICAL EXAMINATION:   General: No Acute Distress   Neurological: Intubated, pupils 3mm and reactive, +corneal reflexes, +cough/gag, +overbreathing vent set rate, not command following, left gaze preference, RUE 0/5, LUE WD- intermittent spasmodic movements, BLEs TF  Pulmonary: Clear to Auscultation, No Rales, No Rhonchi, No Wheezes   Cardiovascular: S1, S2, Regular Rate and Rhythm   Gastrointestinal: Soft, Nontender, Nondistended SUMMARY:    64 year-old woman with history of hypothyroidism, HTN, IDDM, COPD/CHRIS on home O2, RA (on Prednisone), SBO s/p ex lap with lysis of adhesions c/b evisceration,  recent hospitalization in 3/2020 with encephalopathy, significantly hypothyroid (, without myxedema coma), small (stable) right sided SDH, UTI, catatonic reaction to VPA (now resolved) and DKA and psychiatric issues, 5/2020 hospitalization for GIB, Emphysematous cystitis, endometritis, Sigmoid colitis, morbid obesity and functional quadriplegia admitted 5/29/20 for left frontal intracerebral hemorrhage with intraventricular extension. Her ICU course has been notable for GPDs on EEG treated with levetiracetam and lacosamide, uncontrolled diabetes mellitus II treated with insulin drip and then transitioned to NPH and ESBL UTI.     HCP: Norris Rush, Son [phone]; Daughter, Heavenly Schmidt [phone]; son asks that only him or his sister, Heavenly be contacted for patient updates/information as they are both proxys for patient     OVERNIGHT EVENTS: Afebrile  This am, found to have LUE spasmodic movements. Aborted after holding LUE.     ICU Vital Signs Last 24 Hrs  T(C): 36.4 (08 Jun 2020 05:00), Max: 37.3 (07 Jun 2020 11:00)  T(F): 97.5 (08 Jun 2020 05:00), Max: 99.1 (07 Jun 2020 11:00)  HR: 114 (08 Jun 2020 05:00) (97 - 118  ABP: 134/69 (08 Jun 2020 05:00) (121/67 - 149/75)  ABP(mean): 96 (08 Jun 2020 05:00) (88 - 107)  RR: 21 (08 Jun 2020 05:00) (16 - 31)  SpO2: 100% (08 Jun 2020 05:00) (97% - 100%)      06-07-20 @ 07:01  -  06-08-20 @ 07:00  --------------------------------------------------------  IN: 2480 mL / OUT: 1501 mL / NET: 979 mL      Mode: AC/ CMV (Assist Control/ Continuous Mandatory Ventilation), RR (machine): 14, TV (machine): 450, FiO2: 30, PEEP: 5, ITime: 0.8, MAP: 13, PIP: 21  acetaminophen    Suspension .. 650 milliGRAM(s) Oral every 6 hours PRN  amLODIPine   Tablet 10 milliGRAM(s) Oral daily  chlorhexidine 0.12% Liquid 15 milliLiter(s) Oral Mucosa every 12 hours  chlorhexidine 4% Liquid 1 Application(s) Topical <User Schedule>  dextrose 40% Gel 15 Gram(s) Oral once PRN  dextrose 5%. 1000 milliLiter(s) (50 mL/Hr) IV Continuous <Continuous>  dextrose 50% Injectable 12.5 Gram(s) IV Push once  dextrose 50% Injectable 25 Gram(s) IV Push once  dextrose 50% Injectable 25 Gram(s) IV Push once  enoxaparin Injectable 30 milliGRAM(s) SubCutaneous two times a day  fentaNYL    Injectable 25 MICROGram(s) IV Push every 2 hours PRN  glucagon  Injectable 1 milliGRAM(s) IntraMuscular once PRN  insulin lispro (HumaLOG) corrective regimen sliding scale   SubCutaneous every 6 hours  insulin NPH human recombinant 36 Unit(s) SubCutaneous <User Schedule>  insulin NPH human recombinant 40 Unit(s) SubCutaneous <User Schedule>  insulin NPH human recombinant 44 Unit(s) SubCutaneous <User Schedule>  lacosamide Solution 200 milliGRAM(s) Oral two times a day  levETIRAcetam  Solution 1000 milliGRAM(s) Oral two times a day  levothyroxine Injectable 120 MICROGram(s) IV Push at bedtime  meropenem  IVPB 1000 milliGRAM(s) IV Intermittent every 8 hours  meropenem  IVPB      metoprolol tartrate 25 milliGRAM(s) Oral two times a day  pantoprazole  Injectable 40 milliGRAM(s) IV Push daily  predniSONE   Tablet 5 milliGRAM(s) Oral at bedtime  predniSONE   Tablet 15 milliGRAM(s) Oral <User Schedule>  simvastatin 20 milliGRAM(s) Oral at bedtime  sodium chloride 0.9% lock flush 10 milliLiter(s) IV Push every 1 hour PRN                          10.4   11.05 )-----------( 457      ( 08 Jun 2020 01:55 )             33.8     06-08    142  |  104  |  19  ----------------------------<  210<H>  4.2   |  24  |  0.52    Ca    9.7      08 Jun 2020 01:55  Phos  2.7     06-08  Mg     2.1     06-08        ABG - ( 08 Jun 2020 01:52 )  pH, Arterial: 7.45  pH, Blood: x     /  pCO2: 36    /  pO2: 121   / HCO3: 25    / Base Excess: 1.5   /  SaO2: 99          POCT Blood Glucose.: 210 mg/dL (08 Jun 2020 05:07)  POCT Blood Glucose.: 199 mg/dL (07 Jun 2020 23:37)  POCT Blood Glucose.: 231 mg/dL (07 Jun 2020 17:01)  POCT Blood Glucose.: 231 mg/dL (07 Jun 2020 11:35)      CULTURES:  Culture Results:   >100,000 CFU/ml Escherichia coli ESBL (06-04 @ 01:09)  Culture Results:   No growth to date. (06-03 @ 15:00)      PHYSICAL EXAMINATION:   General: No Acute Distress   Neurological: Intubated, pupils 3mm and reactive, +corneal reflexes, +cough/gag, +overbreathing vent set rate, not command following, left gaze preference, RUE 0/5, LUE WD- intermittent spasmodic movements, BLEs TF  Pulmonary: Clear to Auscultation, No Rales, No Rhonchi, No Wheezes   Cardiovascular: S1, S2, Regular Rate and Rhythm   Gastrointestinal: Soft, Nontender, Nondistended

## 2020-06-08 NOTE — PROGRESS NOTE ADULT - SUBJECTIVE AND OBJECTIVE BOX
Chief Complaint/Follow-up on: T2D    Subjective: Patient non-verbal as she is intubated and sedated.  Pt with hypoglycemia last night.     MEDICATIONS  (STANDING):  amLODIPine   Tablet 10 milliGRAM(s) Oral daily  chlorhexidine 0.12% Liquid 15 milliLiter(s) Oral Mucosa every 12 hours  chlorhexidine 4% Liquid 1 Application(s) Topical <User Schedule>  dextrose 5%. 1000 milliLiter(s) (50 mL/Hr) IV Continuous <Continuous>  dextrose 50% Injectable 12.5 Gram(s) IV Push once  dextrose 50% Injectable 25 Gram(s) IV Push once  dextrose 50% Injectable 25 Gram(s) IV Push once  enoxaparin Injectable 30 milliGRAM(s) SubCutaneous two times a day  ertapenem  IVPB 1000 milliGRAM(s) IV Intermittent <User Schedule>  insulin lispro (HumaLOG) corrective regimen sliding scale   SubCutaneous every 6 hours  insulin NPH human recombinant 36 Unit(s) SubCutaneous <User Schedule>  insulin NPH human recombinant 40 Unit(s) SubCutaneous <User Schedule>  insulin NPH human recombinant 44 Unit(s) SubCutaneous <User Schedule>  lacosamide Solution 200 milliGRAM(s) Oral two times a day  levETIRAcetam  Solution 1000 milliGRAM(s) Oral two times a day  levothyroxine Injectable 120 MICROGram(s) IV Push at bedtime  metoprolol tartrate 25 milliGRAM(s) Oral two times a day  pantoprazole  Injectable 40 milliGRAM(s) IV Push daily  predniSONE   Tablet 5 milliGRAM(s) Oral at bedtime  predniSONE   Tablet 15 milliGRAM(s) Oral <User Schedule>  simvastatin 20 milliGRAM(s) Oral at bedtime    MEDICATIONS  (PRN):  acetaminophen    Suspension .. 650 milliGRAM(s) Oral every 6 hours PRN Temp greater or equal to 38C (100.4F), Mild Pain (1 - 3)  dextrose 40% Gel 15 Gram(s) Oral once PRN Blood Glucose LESS THAN 70 milliGRAM(s)/deciliter  fentaNYL    Injectable 25 MICROGram(s) IV Push every 2 hours PRN Severe Pain (7 - 10)  glucagon  Injectable 1 milliGRAM(s) IntraMuscular once PRN Glucose LESS THAN 70 milligrams/deciliter  sodium chloride 0.9% lock flush 10 milliLiter(s) IV Push every 1 hour PRN Pre/post blood products, medications, blood draw, and to maintain line patency      PHYSICAL EXAM:  VITALS: T(C): 36.7 (06-08-20 @ 15:00)  T(F): 98.1 (06-08-20 @ 15:00), Max: 98.8 (06-07-20 @ 19:00)  HR: 103 (06-08-20 @ 15:00) (97 - 119)  BP: --  RR:  (16 - 35)  SpO2:  (97% - 100%)  Wt(kg): --  GENERAL: +sedated, NAD  EYES: closed, no lid lesions  RESPIRATORY: Clear to auscultation bilaterally; No rales, rhonchi, wheezing, or rubs  CARDIOVASCULAR: Regular rate and rhythm; No murmurs  GI: Soft, nontender, non distended, normal bowel sounds      POCT Blood Glucose.: 295 mg/dL (06-08-20 @ 12:48)  POCT Blood Glucose.: 210 mg/dL (06-08-20 @ 05:07)  POCT Blood Glucose.: 199 mg/dL (06-07-20 @ 23:37)  POCT Blood Glucose.: 231 mg/dL (06-07-20 @ 17:01)  POCT Blood Glucose.: 231 mg/dL (06-07-20 @ 11:35)  POCT Blood Glucose.: 174 mg/dL (06-07-20 @ 05:52)  POCT Blood Glucose.: 147 mg/dL (06-07-20 @ 02:46)  POCT Blood Glucose.: 66 mg/dL (06-07-20 @ 00:54)  POCT Blood Glucose.: 94 mg/dL (06-07-20 @ 00:28)  POCT Blood Glucose.: 77 mg/dL (06-07-20 @ 00:27)  POCT Blood Glucose.: 174 mg/dL (06-06-20 @ 17:04)  POCT Blood Glucose.: 288 mg/dL (06-06-20 @ 11:16)  POCT Blood Glucose.: 229 mg/dL (06-06-20 @ 05:09)  POCT Blood Glucose.: 92 mg/dL (06-05-20 @ 23:19)  POCT Blood Glucose.: 92 mg/dL (06-05-20 @ 23:18)  POCT Blood Glucose.: 187 mg/dL (06-05-20 @ 17:00)    06-08    142  |  104  |  19  ----------------------------<  210<H>  4.2   |  24  |  0.52    EGFR if : 119  EGFR if non : 102    Ca    9.7      06-08  Mg     2.1     06-08  Phos  2.7     06-08            Thyroid Function Tests:  06-04 @ 01:58 FreeT4 2.7   05-30 @ 04:44 TSH 12.30 T3 51

## 2020-06-08 NOTE — PROGRESS NOTE ADULT - PROBLEM SELECTOR PLAN 6
Due to the patient's health status and restrictions on visitation during the [facility], the Advance Care Planning service was performed via_____conference call/ phone________ with patients ___daughter Heavenly (HCP, one of six siblings) , Ethics: Sneha__SW: Melanie  [facility] fellow :  Dr Bills____with whom the discussion took place.  Dr Bills described and updated medical issues including seizures and unlikely meaningful recovery from neurological event.  Heavenly ( and Sneha : yusra who have been involved with this family from previous admissions ) described patient as very vocal and her ability to speak her mind was important to her.   We discussed the unlikely recovery of patients speech due to brain hemorrhage . Additionally , patient will need a tracheostomy for respiratory support  something that would also impede patients ability to speak.  Heavenly asked what a palliative approach would look like.  We discussed transfer to PCU and compassionate extubation.  I discussed symptom management based on patients symptom burden.  I assured Burdick that palliative care does not prolong nor do we hasten patients death. Medications would be administered based on symptomatology .  Heavenly was most appreciative of the conference call , questions and concerns addressed.   Contact numbers for this writer provided .  Heavenly will discuss with her siblings and will reach out with any questions. Due to the patient's health status and restrictions on visitation during the [facility], the Advance Care Planning service was performed via_____conference call/ phone________ with patients ___daughter Heavenly (HCP, one of six siblings) , Ethics: Sneha__SW: Melanie  [facility] fellow :  Dr Bills____with whom the discussion took place.    Dr Bills described and updated medical issues including seizures and unlikely meaningful recovery from neurological event.  Heavenly ( and Sneha : yusra who have been involved with this family from previous admissions ) described patient as very vocal and her ability to speak her mind was important to her.   We discussed the unlikely recovery of patients speech due to brain hemorrhage . Additionally , patient will need a tracheostomy for respiratory support  something that would also impede patients ability to speak.  Heavenly asked what a palliative approach would look like.  We discussed transfer to PCU and compassionate extubation.  I discussed symptom management based on patients symptom burden.  I assured Burdick that palliative care does not prolong nor do we hasten patients death. Medications would be administered based on symptomatology .  Heavenly was most appreciative of the conference call , questions and concerns addressed.   Contact numbers for this writer provided .  Heaevnly will discuss with her siblings and will reach out with any questions.  Time in 2 Pm Time out 2:45pm

## 2020-06-08 NOTE — PROGRESS NOTE ADULT - SUBJECTIVE AND OBJECTIVE BOX
Amy Kathi, MYLES  [phone]  Mon-wed  Please page [phone]  THurs-Mabie          SUBJECTIVE AND OBJECTIVE:  INTERVAL HPI/OVERNIGHT EVENTS:  Unresponsive,  neuro exam remains poor   DNR on chart:   Allergies    Depakote (Other)  Seroquel (Other (Severe))    Intolerances    MEDICATIONS  (STANDING):  amLODIPine   Tablet 10 milliGRAM(s) Oral daily  chlorhexidine 0.12% Liquid 15 milliLiter(s) Oral Mucosa every 12 hours  chlorhexidine 4% Liquid 1 Application(s) Topical <User Schedule>  dextrose 5%. 1000 milliLiter(s) (50 mL/Hr) IV Continuous <Continuous>  dextrose 50% Injectable 12.5 Gram(s) IV Push once  dextrose 50% Injectable 25 Gram(s) IV Push once  dextrose 50% Injectable 25 Gram(s) IV Push once  enoxaparin Injectable 30 milliGRAM(s) SubCutaneous two times a day  ertapenem  IVPB 1000 milliGRAM(s) IV Intermittent <User Schedule>  insulin lispro (HumaLOG) corrective regimen sliding scale   SubCutaneous every 6 hours  insulin NPH human recombinant 36 Unit(s) SubCutaneous <User Schedule>  insulin NPH human recombinant 40 Unit(s) SubCutaneous <User Schedule>  insulin NPH human recombinant 44 Unit(s) SubCutaneous <User Schedule>  lacosamide Solution 200 milliGRAM(s) Oral two times a day  levETIRAcetam  Solution 1000 milliGRAM(s) Oral two times a day  levothyroxine Injectable 120 MICROGram(s) IV Push at bedtime  metoprolol tartrate 25 milliGRAM(s) Oral two times a day  metoprolol tartrate Injectable 5 milliGRAM(s) IV Push once  pantoprazole  Injectable 40 milliGRAM(s) IV Push daily  predniSONE   Tablet 5 milliGRAM(s) Oral at bedtime  predniSONE   Tablet 15 milliGRAM(s) Oral <User Schedule>  simvastatin 20 milliGRAM(s) Oral at bedtime    MEDICATIONS  (PRN):  acetaminophen    Suspension .. 650 milliGRAM(s) Oral every 6 hours PRN Temp greater or equal to 38C (100.4F), Mild Pain (1 - 3)  dextrose 40% Gel 15 Gram(s) Oral once PRN Blood Glucose LESS THAN 70 milliGRAM(s)/deciliter  fentaNYL    Injectable 25 MICROGram(s) IV Push every 2 hours PRN Severe Pain (7 - 10)  glucagon  Injectable 1 milliGRAM(s) IntraMuscular once PRN Glucose LESS THAN 70 milligrams/deciliter  sodium chloride 0.9% lock flush 10 milliLiter(s) IV Push every 1 hour PRN Pre/post blood products, medications, blood draw, and to maintain line patency      ITEMS UNCHECKED ARE NOT PRESENT    PRESENT SYMPTOMS: [ x]Unable to obtain due to poor mentation   Source if other than patient:  [ ]Family   [ ]Team     Pain:  [ ]yes [ ]no  QOL impact -   Location -                    Aggravating factors -  Quality -  Radiation -  Timing-  Severity (0-10 scale):  Minimal acceptable level (0-10 scale):     Dyspnea:                           [ ]Mild [ ]Moderate [ ]Severe  Anxiety:                             [ ]Mild [ ]Moderate [ ]Severe  Fatigue:                             [ ]Mild [ ]Moderate [ ]Severe  Nausea:                             [ ]Mild [ ]Moderate [ ]Severe  Loss of appetite:              [ ]Mild [ ]Moderate [ ]Severe  Constipation:                    [ ]Mild [ ]Moderate [ ]Severe    PAIN AD Score:	0  http://geriatrictoolkit.[email or web address]/cog/painad.pdf (Ctrl + left click to view)    Other Symptoms:  [ ]All other review of systems negative     Palliative Performance Status Version 2:   20      %      http://npcrc.org/files/news/palliative_performance_scale_ppsv2.pdf  PHYSICAL EXAM:  Vital Signs Last 24 Hrs  T(C): 36.7 (08 Jun 2020 14:00), Max: 37.3 (07 Jun 2020 15:00)  T(F): 98.1 (08 Jun 2020 14:00), Max: 99.1 (07 Jun 2020 15:00)  HR: 117 (08 Jun 2020 14:41) (97 - 119)  BP: --  BP(mean): --  RR: 26 (08 Jun 2020 14:00) (16 - 30)  SpO2: 100% (08 Jun 2020 14:41) (97% - 100%) I&O's Summary    07 Jun 2020 07:01  -  08 Jun 2020 07:00  --------------------------------------------------------  IN: 2480 mL / OUT: 1501 mL / NET: 979 mL    08 Jun 2020 07:01  -  08 Jun 2020 14:47  --------------------------------------------------------  IN: 595 mL / OUT: 1 mL / NET: 594 mL       GENERAL:  [ ]Alert  [ ]Oriented x   [ ]Lethargic  [ ]Cachexia  [x ]Unarousable  [ ]Verbal  [ x]Non-Verbal  Behavioral:   [ ]Anxiety  [ ]Delirium [ ]Agitation [ ]Other  HEENT:  [ ]Normal   [ ]Dry mouth   [ x]ET Tube/Trach  [ ]Oral lesions  PULMONARY:   [ ]Clear [ ]Tachypnea  [ ]Audible excessive secretions   [ ]Rhonchi        [ ]Right [ ]Left [ ]Bilateral  [ ]Crackles        [ ]Right [ ]Left [ ]Bilateral  [ ]Wheezing     [ ]Right [ ]Left [ ]Bilateral  [ ]Diminished BS [ ] Right [ ]Left [ ]Bilateral  CARDIOVASCULAR:    [ ]Regular [ x]Irregular [ ]Tachy  [ ]Ubaldo [ ]Murmur [ ]Other  GASTROINTESTINAL:  [x]Softly distended  [ ]Distended   [x ]+BS  [ ]Non tender [ ]Tender  [ ]PEG [x ]OGT/ NGT   Last BM:   06-01-20 @ 07:01  -  06-02-20 @ 07:00  --------------------------------------------------------  OUT: 1 mL    06-02-20 @ 07:01  -  06-03-20 @ 07:00  --------------------------------------------------------  OUT: 1 mL    06-03-20 @ 07:01  -  06-04-20 @ 07:00  --------------------------------------------------------  OUT: 12 mL    06-04-20 @ 07:01  -  06-05-20 @ 07:00  --------------------------------------------------------  OUT: 50 mL    06-06-20 @ 07:01  -  06-07-20 @ 07:00  --------------------------------------------------------  OUT: 1 mL    06-07-20 @ 07:01  -  06-08-20 @ 07:00  --------------------------------------------------------  OUT: 1 mL    06-08-20 @ 07:01  -  06-08-20 @ 14:47  --------------------------------------------------------  OUT: 1 mL       GENITOURINARY:  [ ]Normal [x ]Incontinent   [ ]Oliguria/Anuria   [ ]Antoine  MUSCULOSKELETAL:   [ ]Normal   [ ]Weakness  [ ]Bed/Wheelchair bound [ ]Edema  NEUROLOGIC:   [ ]No focal deficits  [ x] Cognitive impairment  [ ] Dysphagia [ ]Dysarthria [ ] Paresis [ ]Other   SKIN:   [ ]Normal  [ ]Rash   [ ]Pressure ulcer(s) [ ]y [ ]n present on admission    CRITICAL CARE:  [ ]Shock Present  [ ]Septic [ ]Cardiogenic [x ]Neurologic [ ]Hypovolemic  [ ]Vasopressors [ ]Inotropes  [x ]Respiratory failure present [x ]Mechanical Ventilation [ ]Non-invasive ventilatory support [ ]High-Flow  [ x]Acute  [ ]Chronic [ ]Hypoxic  [ ]Hypercarbic [ ]Other  [ ]Other organ failure     LABS:                        10.4   11.05 )-----------( 457      ( 08 Jun 2020 01:55 )             33.8   06-08    142  |  104  |  19  ----------------------------<  210<H>  4.2   |  24  |  0.52    Ca    9.7      08 Jun 2020 01:55  Phos  2.7     06-08  Mg     2.1     06-08          RADIOLOGY & ADDITIONAL STUDIES:    < from: CT Head No Cont (06.02.20 @ 09:02) >      INTERPRETATION:  INDICATIONS:  left ich    TECHNIQUE:  Serial axial images were obtained from the skull base to the vertex without intravenous contrast.    COMPARISONEXAMINATION: 6/1/2020    FINDINGS:    VENTRICLES AND SULCI:  Unchanged mass effect on the left frontal horn of the lateral ventricle related to the left frontal hematoma as seen on the prior without significant interval change compared with the priorstudy. Small amount of layering hemorrhage in the occipital horns of the lateral ventricle  INTRA-AXIAL: No change in appearance of large left frontal hematoma with mass effect on the left frontal horn. Some associated subarachnoid hemorrhage is seen  EXTRA-AXIAL:  Subtle subarachnoid hemorrhage in the left sylvian region and in the interhemispheric fissure as seen on the prior as well.  VISUALIZED SINUSES:  Clear.  VISUALIZED MASTOIDS:  Clear.  CALVARIUM:  Normal.  MISCELLANEOUS: Patient is intubated with an NG tube    IMPRESSION:  No change from 6/1/2020          < end of copied text >        Protein Calorie Malnutrition Present: [ ]mild [ ]moderate [ ]severe [ ]underweight [ x]morbid obesity  https://www.andeal.org/vault/2440/web/files/ONC/Table_Clinical%20Characteristics%20to%20Document%20Malnutrition-White%20JV%20et%20al%091739.pdf    Height (cm): 162.6 (05-30-20 @ 00:49), 152.4 (05-29-20 @ 19:18), 154.9 (05-12-20 @ 21:48)  Weight (kg): 99.6 (05-30-20 @ 00:49), 136.1 (05-29-20 @ 19:18), 98.2 (05-12-20 @ 23:57)  BMI (kg/m2): 37.7 (05-30-20 @ 00:49), 51.5 (05-30-20 @ 00:49), 58.6 (05-29-20 @ 19:18)    [ ]PPSV2 < or = 30%  [ ]significant weight loss [ x]poor nutritional intake [ ]anasarca   Albumin, Serum: 2.7 g/dL (05-29-20 @ 20:22)   [ ]Artificial Nutrition    REFERRALS:   [ ]Chaplaincy  [ ]Hospice  [ ]Child Life  [x ]Social Work and Ethics  [ ]Case management [ ]Holistic Therapy     Goals of Care Document:

## 2020-06-08 NOTE — PROGRESS NOTE ADULT - ASSESSMENT
Phone discussion at [phone] with daughter Heavenly, Lilo Palomomireillefilomena, NP Palliative Medicine, Dr. Yas Ruggiero, Fellow, BHAVIN Briones and Ethics Attending SAM Altamirano.     Heavenly was able to speak to her mother's current condition and asked pertinent questions regarding prognosis and spoke about her mother's concept of quality of life.  All questions answered and Heavenly was able to speak back and acknowledge that her mother's concept of quality of lif would include being able to speak and not be dependent on machines. Also questioned whether her mother would even be able to have a PEG.     Heavenly expressed gratefulness for this additional conversation and will maintain contact. She has been in conversation with all her siblings and they do understand the gravity of the situation.     All contact information given and Ethics will remain available as decision points occur.    Sneha Harrell, BSN, PCCN, [facility]  Medical Ethics  [phone]

## 2020-06-08 NOTE — PROGRESS NOTE ADULT - SUBJECTIVE AND OBJECTIVE BOX
64 year old female admitted with acute mental status changes, found on CT Scan of head with intracerebral hemorrhage.  Past medical history  of Hypothyroidism, HTN, DM, COPD/CHRIS, RA (Prednisone), SBO s/p ex lap with lysis of adhesions c/b evisceration, recent hospitalization in 3/2020 with encephalopathy, significantly hypothyroid (, without myxedema coma), small (stable) right sided SDH, UTI, catatonic reaction to VPA (now resolved) and DKA and psychiatric issues, 5/2020 hospitalization for GIB, Emphysematous cystitis, endometritis, Sigmoid colitis, morbid obesity, functional quadriplegia.     Ethics consult requested due to ana maría Pisano not cooperating with primary team and concern over alternative decision maker.  Patient's Living Will (from Sept 2019) found in [employer] which appointed both son Norris and daughter Heavenly as co-decision makers.     Phone meeting with Heavenly Pulling, daughter and named co-surrogate decision maker.

## 2020-06-08 NOTE — PROGRESS NOTE ADULT - SUBJECTIVE AND OBJECTIVE BOX
O: remains intubated, with poor exam    T(C): 36.9 (06-08-20 @ 17:00), Max: 37.1 (06-07-20 @ 19:00)  HR: 115 (06-08-20 @ 17:00) (98 - 119)  BP: --  RR: 23 (06-08-20 @ 17:00) (16 - 35)  SpO2: 100% (06-08-20 @ 17:00) (97% - 100%)  06-07-20 @ 07:01  -  06-08-20 @ 07:00  --------------------------------------------------------  IN: 2480 mL / OUT: 1501 mL / NET: 979 mL    06-08-20 @ 07:01  -  06-08-20 @ 18:24  --------------------------------------------------------  IN: 865 mL / OUT: 1 mL / NET: 864 mL    acetaminophen    Suspension .. 650 milliGRAM(s) Oral every 6 hours PRN  amLODIPine   Tablet 10 milliGRAM(s) Oral daily  chlorhexidine 0.12% Liquid 15 milliLiter(s) Oral Mucosa every 12 hours  chlorhexidine 4% Liquid 1 Application(s) Topical <User Schedule>  dextrose 40% Gel 15 Gram(s) Oral once PRN  dextrose 5%. 1000 milliLiter(s) IV Continuous <Continuous>  dextrose 50% Injectable 12.5 Gram(s) IV Push once  dextrose 50% Injectable 25 Gram(s) IV Push once  dextrose 50% Injectable 25 Gram(s) IV Push once  enoxaparin Injectable 30 milliGRAM(s) SubCutaneous two times a day  ertapenem  IVPB 1000 milliGRAM(s) IV Intermittent <User Schedule>  fentaNYL    Injectable 25 MICROGram(s) IV Push every 2 hours PRN  glucagon  Injectable 1 milliGRAM(s) IntraMuscular once PRN  insulin lispro (HumaLOG) corrective regimen sliding scale   SubCutaneous every 6 hours  insulin NPH human recombinant 30 Unit(s) SubCutaneous <User Schedule>  insulin NPH human recombinant 48 Unit(s) SubCutaneous <User Schedule>  insulin NPH human recombinant 40 Unit(s) SubCutaneous <User Schedule>  lacosamide Solution 200 milliGRAM(s) Oral two times a day  levETIRAcetam  Solution 1000 milliGRAM(s) Oral two times a day  levothyroxine Injectable 120 MICROGram(s) IV Push at bedtime  metoprolol tartrate 25 milliGRAM(s) Oral two times a day  pantoprazole  Injectable 40 milliGRAM(s) IV Push daily  predniSONE   Tablet 5 milliGRAM(s) Oral at bedtime  predniSONE   Tablet 15 milliGRAM(s) Oral <User Schedule>  simvastatin 20 milliGRAM(s) Oral at bedtime  sodium chloride 0.9% lock flush 10 milliLiter(s) IV Push every 1 hour PRN  Mode: AC/ CMV (Assist Control/ Continuous Mandatory Ventilation), RR (machine): 14, TV (machine): 450, FiO2: 30, PEEP: 5, ITime: 0.8, MAP: 11, PIP: 23    PHYSICAL EXAMINATION:   General: No Acute Distress   Neurological: Intubated, pupils 3mm and reactive, +corneal reflexes, +cough/gag, +overbreathing vent set rate, not command following, left gaze preference, RUE 0/5, LUE WD- intermittent spasmodic movements, BLEs TF  Pulmonary: Clear to Auscultation, No Rales, No Rhonchi, No Wheezes   Cardiovascular: S1, S2, Regular Rate and Rhythm   Gastrointestinal: Soft, Nontender, Nondistended       LABS:  Na: 142 (06-08 @ 01:55), 144 (06-07 @ 01:02), 139 (06-06 @ 06:00)  K: 4.2 (06-08 @ 01:55), 3.8 (06-07 @ 01:02), 4.3 (06-06 @ 06:00)  Cl: 104 (06-08 @ 01:55), 106 (06-07 @ 01:02), 103 (06-06 @ 06:00)  CO2: 24 (06-08 @ 01:55), 23 (06-07 @ 01:02), 20 (06-06 @ 06:00)  BUN: 19 (06-08 @ 01:55), 19 (06-07 @ 01:02), 19 (06-06 @ 06:00)  Cr: 0.52 (06-08 @ 01:55), 0.53 (06-07 @ 01:02), 0.58 (06-06 @ 06:00)  Glu: 210(06-08 @ 01:55), 101(06-07 @ 01:02), 245(06-06 @ 06:00)    Hgb: 10.4 (06-08 @ 01:55), 9.0 (06-07 @ 01:02), 9.2 (06-06 @ 16:50), 6.8 (06-06 @ 06:00)  Hct: 33.8 (06-08 @ 01:55), 28.3 (06-07 @ 01:02), 28.8 (06-06 @ 16:50), 22.8 (06-06 @ 06:00)  WBC: 11.05 (06-08 @ 01:55), 12.55 (06-07 @ 01:02), 12.89 (06-06 @ 16:50), 13.28 (06-06 @ 06:00)  Plt: 457 (06-08 @ 01:55), 440 (06-07 @ 01:02), 465 (06-06 @ 16:50), 451 (06-06 @ 06:00)    INR:   PTT:               Assessment and Plan:   Left frontal intracerebral hemorrhage, with mass effect , generalized periodic discharges, UTI     NEURO:  Q4 neurochecks  Cont Keppra and Vimpat for GPDs/risk for seizures  Sedation: fentanyl PRN   Pain management with Tylenol.   Given previous psych history and workup not revealing any cause, r/o NMDA encephalitis. Follow up.    Trach and PEG being discussed with family.   Activity: [] OOB as tolerated [X] Bedrest [X] PT [X] OT [] PMNR      PULM:  acute respiratory failure   Intubated since 5/29  History of COPD   CPAP as tolerated   Unable to extubate given LOC     CV:  Keep -160 mmhg   TTE negative for PFO  on  norvasc and metoprolol    Statin for HLD       RENAL:  Na wnl  IVL       GI:  Diet: On Glucerna Tube feeds  Last BM 6/7  Will need to discuss PEG with family  GI prophylaxis [] not indicated [X] PPI [] other:      ENDO:    Uncontrolled type 2 diabetes mellitus with hyperglycemia.  Plan: -Adjust NPH as such: 40/48/48/30 at 12am, 6am, 12pm and 6pm.  -moderate scale q6.   Adult Hypothyroidism.  Plan: -continue synthroid 120mcg IVP daily. Will recheck in on 6/11 as her dose was decreased from 150mcg to 120mcg on 6/4.     HEME/ONC:  On SQL.  VTE prophylaxis: [X] SCDs [X] chemoprophylaxis [] high risk of DVT/PE on admission due to:  LE doppler 5/30- No DVT    ID:  Afebrile   ESBL in urine on ertapenem for total 7 days for UTI      MISC: Palliative Care consulted. Family meeting at 2pm with daughter BHAVIN Burdick and palliative care    SOCIAL/FAMILY:  [X] updated family via phone  [] family meeting    CODE STATUS:  [X] Full Code [] DNR [] DNI [] Palliative/Comfort Care    DISPOSITION:  [X] ICU [] Stroke Unit [] Floor [] EMU [] RCU [] PCU    [X] Patient is at high risk of neurologic deterioration/death due to: resp failure, seizures, hemorrhage.     Time spent: 30  [X] critical care minutes O: remains intubated, with poor exam    T(C): 36.9 (06-08-20 @ 17:00), Max: 37.1 (06-07-20 @ 19:00)  HR: 115 (06-08-20 @ 17:00) (98 - 119)  BP: --  RR: 23 (06-08-20 @ 17:00) (16 - 35)  SpO2: 100% (06-08-20 @ 17:00) (97% - 100%)  06-07-20 @ 07:01  -  06-08-20 @ 07:00  --------------------------------------------------------  IN: 2480 mL / OUT: 1501 mL / NET: 979 mL    06-08-20 @ 07:01  -  06-08-20 @ 18:24  --------------------------------------------------------  IN: 865 mL / OUT: 1 mL / NET: 864 mL    acetaminophen    Suspension .. 650 milliGRAM(s) Oral every 6 hours PRN  amLODIPine   Tablet 10 milliGRAM(s) Oral daily  chlorhexidine 0.12% Liquid 15 milliLiter(s) Oral Mucosa every 12 hours  chlorhexidine 4% Liquid 1 Application(s) Topical <User Schedule>  dextrose 40% Gel 15 Gram(s) Oral once PRN  dextrose 5%. 1000 milliLiter(s) IV Continuous <Continuous>  dextrose 50% Injectable 12.5 Gram(s) IV Push once  dextrose 50% Injectable 25 Gram(s) IV Push once  dextrose 50% Injectable 25 Gram(s) IV Push once  enoxaparin Injectable 30 milliGRAM(s) SubCutaneous two times a day  ertapenem  IVPB 1000 milliGRAM(s) IV Intermittent <User Schedule>  fentaNYL    Injectable 25 MICROGram(s) IV Push every 2 hours PRN  glucagon  Injectable 1 milliGRAM(s) IntraMuscular once PRN  insulin lispro (HumaLOG) corrective regimen sliding scale   SubCutaneous every 6 hours  insulin NPH human recombinant 30 Unit(s) SubCutaneous <User Schedule>  insulin NPH human recombinant 48 Unit(s) SubCutaneous <User Schedule>  insulin NPH human recombinant 40 Unit(s) SubCutaneous <User Schedule>  lacosamide Solution 200 milliGRAM(s) Oral two times a day  levETIRAcetam  Solution 1000 milliGRAM(s) Oral two times a day  levothyroxine Injectable 120 MICROGram(s) IV Push at bedtime  metoprolol tartrate 25 milliGRAM(s) Oral two times a day  pantoprazole  Injectable 40 milliGRAM(s) IV Push daily  predniSONE   Tablet 5 milliGRAM(s) Oral at bedtime  predniSONE   Tablet 15 milliGRAM(s) Oral <User Schedule>  simvastatin 20 milliGRAM(s) Oral at bedtime  sodium chloride 0.9% lock flush 10 milliLiter(s) IV Push every 1 hour PRN  Mode: AC/ CMV (Assist Control/ Continuous Mandatory Ventilation), RR (machine): 14, TV (machine): 450, FiO2: 30, PEEP: 5, ITime: 0.8, MAP: 11, PIP: 23    PHYSICAL EXAMINATION:   General: No Acute Distress   Neurological: Intubated, opens eyes to painful stimuli, pupils 3mm and reactive, +corneal reflexes, +cough/gag, +overbreathing vent set rate, not command following, left gaze preference, RUE 0/5, LUE WD- intermittent spasmodic movements, BLEs TF  Pulmonary: Clear to Auscultation, No Rales, No Rhonchi, No Wheezes   Cardiovascular: S1, S2, Regular Rate and Rhythm   Gastrointestinal: Soft, Nontender, Nondistended       LABS:  Na: 142 (06-08 @ 01:55), 144 (06-07 @ 01:02), 139 (06-06 @ 06:00)  K: 4.2 (06-08 @ 01:55), 3.8 (06-07 @ 01:02), 4.3 (06-06 @ 06:00)  Cl: 104 (06-08 @ 01:55), 106 (06-07 @ 01:02), 103 (06-06 @ 06:00)  CO2: 24 (06-08 @ 01:55), 23 (06-07 @ 01:02), 20 (06-06 @ 06:00)  BUN: 19 (06-08 @ 01:55), 19 (06-07 @ 01:02), 19 (06-06 @ 06:00)  Cr: 0.52 (06-08 @ 01:55), 0.53 (06-07 @ 01:02), 0.58 (06-06 @ 06:00)  Glu: 210(06-08 @ 01:55), 101(06-07 @ 01:02), 245(06-06 @ 06:00)    Hgb: 10.4 (06-08 @ 01:55), 9.0 (06-07 @ 01:02), 9.2 (06-06 @ 16:50), 6.8 (06-06 @ 06:00)  Hct: 33.8 (06-08 @ 01:55), 28.3 (06-07 @ 01:02), 28.8 (06-06 @ 16:50), 22.8 (06-06 @ 06:00)  WBC: 11.05 (06-08 @ 01:55), 12.55 (06-07 @ 01:02), 12.89 (06-06 @ 16:50), 13.28 (06-06 @ 06:00)  Plt: 457 (06-08 @ 01:55), 440 (06-07 @ 01:02), 465 (06-06 @ 16:50), 451 (06-06 @ 06:00)    INR:   PTT:       Assessment and Plan:   Left frontal intracerebral hemorrhage, with mass effect , generalized periodic discharges, UTI     NEURO:  Q4 neurochecks  Cont Keppra and Vimpat for GPDs/risk for seizures  Sedation: fentanyl PRN   Pain management with Tylenol.   Given previous psych history and workup not revealing any cause, r/o NMDA encephalitis. Follow up.    Trach and PEG being discussed with family.   Activity: [] OOB as tolerated [X] Bedrest [X] PT [X] OT [] PMNR      PULM:  acute respiratory failure   Intubated since 5/29  History of COPD   CPAP as tolerated   Unable to extubate given LOC     CV:  Keep -160 mmhg   TTE negative for PFO  on  norvasc and metoprolol    Statin for HLD   has central line  a line      RENAL:  Na wnl  IVL       GI:  Diet: On Glucerna Tube feeds  Last BM today   Will need to discuss PEG with family  GI prophylaxis [] not indicated [X] PPI [] other:      ENDO:    Uncontrolled type 2 diabetes mellitus with hyperglycemia.  Plan: -Adjust NPH as such: 40/48/48/30 at 12am, 6am, 12pm and 6pm.  -moderate scale q6.   Adult Hypothyroidism.  Plan: -continue synthroid 120mcg IVP daily. Will recheck in on 6/11 as her dose was decreased from 150mcg to 120mcg on 6/4.     HEME/ONC:  On SQL.  VTE prophylaxis: [X] SCDs [X] chemoprophylaxis [] high risk of DVT/PE on admission due to:  LE doppler 5/30- No DVT    ID:  Afebrile   ESBL in urine on ertapenem for total 7 days for UTI      MISC: Palliative Care consulted. Family meeting at 2pm with daughter BHAVIN Burdick and palliative care    SOCIAL/FAMILY:  [X] updated family via phone  [] family meeting    CODE STATUS:  [X] Full Code [] DNR [] DNI [] Palliative/Comfort Care    DISPOSITION:  [X] ICU [] Stroke Unit [] Floor [] EMU [] RCU [] PCU    [X] Patient is at high risk of neurologic deterioration/death due to: resp failure, seizures, hemorrhage.     Time spent: 30  [X] critical care minutes

## 2020-06-08 NOTE — PROGRESS NOTE ADULT - ASSESSMENT
ASSESSMENT:  64 year old female with PMHx of Hypothyroidism, HTN, DM, COPD/CHRIS, RA (Prednisone), SBO s/p ex lap with lysis of adhesions c/b evisceration, recent hospitalization in 3/2020 with encephalopathy, significantly hypothyroid (, without myxedema coma), small (stable) right sided SDH, UTI, catatonic reaction to VPA (now resolved) and DKA and psychiatric issues, 5/2020 hospitalization for GIB, Emphysematous cystitis, endometritis, Sigmoid colitis, morbid obesity, functional quadriplegia. Presented on 5/29 with altered mental status. Patient was confused, didn't recognize son. Also found to be gasping for air, ?seizure. 	  HCP: Norris Rush, Son [phone]; Daughter, Heavenly Schmidt [phone]; son asks that only him or his sisterHeavenly be contacted for patient updates/information as they are both proxy's for patient. Medical ethics contacted.       NEURO:  Q4 neurochecks  Cont Keppra and Vimpat for seizures  Sedation fentanyl PRN   Pain management with Tylenol.   Given previous psych history and workup not revealing any cause, r/o NMDA encephalitis. Follow up report   Trach and PEG discussed with family.   Activity: [] OOB as tolerated [X] Bedrest [X] PT [X] OT [] PMNR      PULM:  Intubated since 5/29  History of COPD was on prednisone at home, now on prednisone 15 mg and  5 mg daily   CPAP as tolerated    CV:  Keep -160  TTE negative for PFO  HTN: continue norvasc and metoprolol    Statin for HLD     RENAL:  Na wnl  IVL         GI:  Diet: On Glucerna Tube feeds  Last BM 6/7  Will need to discuss PEG with family  GI prophylaxis [] not indicated [X] PPI [] other:      ENDO:   Endo Following NPH 40 and 44  Continue synthroid. Endocrine following.   Continue prednisone 15mg, 5 mg at bedtime (was on it at home for RA)  Goal euglycemia (-180)      HEME/ONC:  On SQL.  VTE prophylaxis: [X] SCDs [X] chemoprophylaxis [] high risk of DVT/PE on admission due to:  LE doppler 5/30- No DVT  Anemia- improved     ID:  afebrile   meopnem for ESBL in urine         MISC: Palliative Care consulted     SOCIAL/FAMILY:  [X] updated family via phone  [] family meeting    CODE STATUS:  [X] Full Code [] DNR [] DNI [] Palliative/Comfort Care    DISPOSITION:  [X] ICU [] Stroke Unit [] Floor [] EMU [] RCU [] PCU    [X] Patient is at high risk of neurologic deterioration/death due to: resp failure, seizures, hemorrhage.     Time spent: _45  [X] critical care minutes ASSESSMENT:  64 year old female with PMHx of Hypothyroidism, HTN, DM, COPD/CHRIS, RA (Prednisone), SBO s/p ex lap with lysis of adhesions c/b evisceration, recent hospitalization in 3/2020 with encephalopathy, significantly hypothyroid (, without myxedema coma), small (stable) right sided SDH, UTI, catatonic reaction to VPA (now resolved) and DKA and psychiatric issues, 5/2020 hospitalization for GIB, Emphysematous cystitis, endometritis, Sigmoid colitis, morbid obesity, functional quadriplegia. Presented on 5/29 with altered mental status. Patient was confused, didn't recognize son. Also found to be gasping for air, ?seizure. 	  HCP: Norris Rush, Son [phone]; Daughter, Heavenly Schmidt [phone]; son asks that only him or his sister, Heavenly be contacted for patient updates/information as they are both proxy's for patient. Medical ethics contacted.     Assessment:  Please Check When Present   []  GCS  E   V  M     Heart Failure: []Acute, [] acute on chronic , []chronic  Heart Failure:  [] Diastolic (HFpEF), [] Systolic (HFrEF), []Combined (HFpEF and HFrEF), [] RHF, [] Pulm HTN, [] Other    [] SAM, [] ATN, [] AIN, [] other  [] CKD1, [] CKD2, [] CKD 3, [] CKD 4, [] CKD 5, []ESRD    Encephalopathy: [] Metabolic, [] Hepatic, [] toxic, [] Neurological, [] Other    Abnormal Nurtitional Status: [] malnurtition (see nutrition note), [ ]underweight: BMI < 19, [] morbid obesity: BMI >40, [] Cachexia    [] Sepsis  [] hypovolemic shock,[] cardiogenic shock, [] hemorrhagic shock, [] neuogenic shock  [] Acute Respiratory Failure  []Cerebral edema, [] Brain compression/ herniation,   [] Functional quadriplegia  [] Acute blood loss anemia        NEURO:  Q4 neurochecks  Cont Keppra and Vimpat for seizures  Sedation fentanyl PRN   Pain management with Tylenol.   Given previous psych history and workup not revealing any cause, r/o NMDA encephalitis. Follow up report   Trach and PEG discussed with family.   Activity: [] OOB as tolerated [X] Bedrest [X] PT [X] OT [] PMNR      PULM:  Intubated since 5/29  History of COPD was on prednisone at home, now on prednisone 15 mg and  5 mg daily   CPAP as tolerated    CV:  Keep -160  TTE negative for PFO  HTN: continue norvasc and metoprolol    Statin for HLD     RENAL:  Na wnl  IVL         GI:  Diet: On Glucerna Tube feeds  Last BM 6/7  Will need to discuss PEG with family  GI prophylaxis [] not indicated [X] PPI [] other:      ENDO:   Endo Following NPH 40 and 44  Continue synthroid. Endocrine following.   Continue prednisone 15mg, 5 mg at bedtime (was on it at home for RA)  Goal euglycemia (-180)      HEME/ONC:  On SQL.  VTE prophylaxis: [X] SCDs [X] chemoprophylaxis [] high risk of DVT/PE on admission due to:  LE doppler 5/30- No DVT  Anemia- improved     ID:  afebrile   meopnem for ESBL in urine         MISC: Palliative Care consulted     SOCIAL/FAMILY:  [X] updated family via phone  [] family meeting    CODE STATUS:  [X] Full Code [] DNR [] DNI [] Palliative/Comfort Care    DISPOSITION:  [X] ICU [] Stroke Unit [] Floor [] EMU [] RCU [] PCU    [X] Patient is at high risk of neurologic deterioration/death due to: resp failure, seizures, hemorrhage.     Time spent: _45  [X] critical care minutes ASSESSMENT:  64 year old female with PMHx of Hypothyroidism, HTN, DM, COPD/CHRIS, RA (Prednisone), SBO s/p ex lap with lysis of adhesions c/b evisceration, recent hospitalization in 3/2020 with encephalopathy, significantly hypothyroid (, without myxedema coma), small (stable) right sided SDH, UTI, catatonic reaction to VPA (now resolved) and DKA and psychiatric issues, 5/2020 hospitalization for GIB, Emphysematous cystitis, endometritis, Sigmoid colitis, morbid obesity, functional quadriplegia. Presented on 5/29 with altered mental status. Patient was confused, didn't recognize son. Also found to be gasping for air, ?seizure. 	  HCP: Norris Rush, Son [phone]; Daughter, Heavenly Schmidt [phone]; son asks that only him or his sisterHeavenly be contacted for patient updates/information as they are both proxy's for patient. Medical ethics contacted      NEURO:  Q4 neurochecks  Cont Keppra and Vimpat for seizures  Sedation fentanyl PRN   Pain management with Tylenol.   Given previous psych history and workup not revealing any cause, r/o NMDA encephalitis. Follow up report   Trach and PEG discussed with family.   Activity: [] OOB as tolerated [X] Bedrest [X] PT [X] OT [] PMNR      PULM:  Intubated since 5/29  History of COPD   CPAP as tolerated   Need to discuss trach with family 6/8    CV:  Keep -160  TTE negative for PFO  HTN: continue norvasc and metoprolol    Statin for HLD       RENAL:  Na wnl  IVL       GI:  Diet: On Glucerna Tube feeds  Last BM 6/7  Will need to discuss PEG with family  GI prophylaxis [] not indicated [X] PPI [] other:      ENDO:   Endo Following - adjusting NPH.   Better control  Continue synthroid. Endocrine following.   Continue prednisone 15mg, 5 mg at bedtime (was on it at home for RA)  Goal euglycemia (-180)      HEME/ONC:  On SQL.  VTE prophylaxis: [X] SCDs [X] chemoprophylaxis [] high risk of DVT/PE on admission due to:  LE doppler 5/30- No DVT  Anemia- improved     ID:  Afebrile   Meropnem for ESBL in urine.   Change to ertapenem for total 7 days      MISC: Palliative Care consulted. Family meeting at 2pm with son,daughter and palliative    SOCIAL/FAMILY:  [X] updated family via phone  [] family meeting    CODE STATUS:  [X] Full Code [] DNR [] DNI [] Palliative/Comfort Care    DISPOSITION:  [X] ICU [] Stroke Unit [] Floor [] EMU [] RCU [] PCU    [X] Patient is at high risk of neurologic deterioration/death due to: resp failure, seizures, hemorrhage.     Time spent: _45  [X] critical care minutes ASSESSMENT:  64 year old female with PMHx of Hypothyroidism, HTN, DM, COPD/CHRIS, RA (Prednisone), SBO s/p ex lap with lysis of adhesions c/b evisceration, recent hospitalization in 3/2020 with encephalopathy, significantly hypothyroid (, without myxedema coma), small (stable) right sided SDH, UTI, catatonic reaction to VPA (now resolved) and DKA and psychiatric issues, 5/2020 hospitalization for GIB, Emphysematous cystitis, endometritis, Sigmoid colitis, morbid obesity, functional quadriplegia. Presented on 5/29 with altered mental status. Patient was confused, didn't recognize son. Also found to be gasping for air, ?seizure. 	  HCP: Norris Rush, Son [phone]; Daughter, Heavenly Schmidt [phone]; son asks that only him or his sisterHeavenly be contacted for patient updates/information as they are both proxy's for patient. Medical ethics contacted      NEURO:  Q4 neurochecks  Cont Keppra and Vimpat for seizures  Sedation fentanyl PRN   Pain management with Tylenol.   Given previous psych history and workup not revealing any cause, r/o NMDA encephalitis. Follow up.    Trach and PEG being discussed with family.   Activity: [] OOB as tolerated [X] Bedrest [X] PT [X] OT [] PMNR      PULM:  Intubated since 5/29  History of COPD   CPAP as tolerated   Need to discuss trach with family 6/8    CV:  Keep -160  TTE negative for PFO  HTN: continue norvasc and metoprolol    Statin for HLD       RENAL:  Na wnl  IVL       GI:  Diet: On Glucerna Tube feeds  Last BM 6/7  Will need to discuss PEG with family  GI prophylaxis [] not indicated [X] PPI [] other:      ENDO:   Endo Following - adjusting NPH.   Better control  Continue synthroid. Endocrine following.   Continue prednisone 15mg, 5 mg at bedtime (was on it at home for RA)  Goal euglycemia (-180)      HEME/ONC:  On SQL.  VTE prophylaxis: [X] SCDs [X] chemoprophylaxis [] high risk of DVT/PE on admission due to:  LE doppler 5/30- No DVT  Anemia- improved     ID:  Afebrile   Meropnem for ESBL in urine.   Narrow to ertapenem for total 7 days for      MISC: Palliative Care consulted. Family meeting at 2pm with daughter Heavenly, BHAVIN and palliative care    SOCIAL/FAMILY:  [X] updated family via phone  [] family meeting    CODE STATUS:  [X] Full Code [] DNR [] DNI [] Palliative/Comfort Care    DISPOSITION:  [X] ICU [] Stroke Unit [] Floor [] EMU [] RCU [] PCU    [X] Patient is at high risk of neurologic deterioration/death due to: resp failure, seizures, hemorrhage.     Time spent: _45  [X] critical care minutes ASSESSMENT:  Left frontal intracerebral hemorrhage, generalized periodic discharges, UTI     NEURO:  Q4 neurochecks  Cont Keppra and Vimpat for GPDs/risk for seizures  Sedation: fentanyl PRN   Pain management with Tylenol.   Given previous psych history and workup not revealing any cause, r/o NMDA encephalitis. Follow up.    Trach and PEG being discussed with family.   Activity: [] OOB as tolerated [X] Bedrest [X] PT [X] OT [] PMNR      PULM:  Intubated since 5/29  History of COPD   CPAP as tolerated   Need to discuss trach with family 6/8    CV:  Keep -160  TTE negative for PFO  HTN: continue norvasc and metoprolol    Statin for HLD       RENAL:  Na wnl  IVL       GI:  Diet: On Glucerna Tube feeds  Last BM 6/7  Will need to discuss PEG with family  GI prophylaxis [] not indicated [X] PPI [] other:      ENDO:   Endo Following - adjusting NPH.   Better control  Continue synthroid. Endocrine following.   Continue prednisone 15mg, 5 mg at bedtime (was on it at home for RA)  Goal euglycemia (-180)      HEME/ONC:  On SQL.  VTE prophylaxis: [X] SCDs [X] chemoprophylaxis [] high risk of DVT/PE on admission due to:  LE doppler 5/30- No DVT  Anemia- improved     ID:  Afebrile   Meropnem for ESBL in urine.   Narrow to ertapenem for total 7 days for UTI      MISC: Palliative Care consulted. Family meeting at 2pm with daughter BHAVIN Burdick and palliative care    SOCIAL/FAMILY:  [X] updated family via phone  [] family meeting    CODE STATUS:  [X] Full Code [] DNR [] DNI [] Palliative/Comfort Care    DISPOSITION:  [X] ICU [] Stroke Unit [] Floor [] EMU [] RCU [] PCU    [X] Patient is at high risk of neurologic deterioration/death due to: resp failure, seizures, hemorrhage.     Time spent: _45  [X] critical care minutes

## 2020-06-08 NOTE — PROGRESS NOTE ADULT - ASSESSMENT
63 yo female with T2D uncontrolled while inpatient (HbA1c 7.4%) with no known complications and hypothyroidism here with mental status changes associated with shaking found to have a left frontal parenchymal hematoma with mass effect on the left lateral ventricle and intraventricular extension with blood layering in the occipital horns. Endocrine following for hyperglycemia.

## 2020-06-08 NOTE — PROGRESS NOTE ADULT - PROBLEM SELECTOR PLAN 4
-continue statin therapy  Jia Jules MD  Pager: [phone], between 9am-6pm  Nights and Weekends: [phone]  Continue statin

## 2020-06-08 NOTE — PROGRESS NOTE ADULT - ASSESSMENT
65 Y/O multiple medical issues as described above admitted with altered mental status  CTH with left frontal ICH/SAH/IVH.  Called for goals of care

## 2020-06-08 NOTE — PROGRESS NOTE ADULT - SUBJECTIVE AND OBJECTIVE BOX
Chief Complaint/Follow-up on:     Subjective:    MEDICATIONS  (STANDING):  amLODIPine   Tablet 10 milliGRAM(s) Oral daily  chlorhexidine 0.12% Liquid 15 milliLiter(s) Oral Mucosa every 12 hours  chlorhexidine 4% Liquid 1 Application(s) Topical <User Schedule>  dextrose 5%. 1000 milliLiter(s) (50 mL/Hr) IV Continuous <Continuous>  dextrose 50% Injectable 12.5 Gram(s) IV Push once  dextrose 50% Injectable 25 Gram(s) IV Push once  dextrose 50% Injectable 25 Gram(s) IV Push once  enoxaparin Injectable 30 milliGRAM(s) SubCutaneous two times a day  ertapenem  IVPB 1000 milliGRAM(s) IV Intermittent <User Schedule>  insulin lispro (HumaLOG) corrective regimen sliding scale   SubCutaneous every 6 hours  insulin NPH human recombinant 36 Unit(s) SubCutaneous <User Schedule>  insulin NPH human recombinant 40 Unit(s) SubCutaneous <User Schedule>  insulin NPH human recombinant 44 Unit(s) SubCutaneous <User Schedule>  lacosamide Solution 200 milliGRAM(s) Oral two times a day  levETIRAcetam  Solution 1000 milliGRAM(s) Oral two times a day  levothyroxine Injectable 120 MICROGram(s) IV Push at bedtime  metoprolol tartrate 25 milliGRAM(s) Oral two times a day  pantoprazole  Injectable 40 milliGRAM(s) IV Push daily  predniSONE   Tablet 5 milliGRAM(s) Oral at bedtime  predniSONE   Tablet 15 milliGRAM(s) Oral <User Schedule>  simvastatin 20 milliGRAM(s) Oral at bedtime    MEDICATIONS  (PRN):  acetaminophen    Suspension .. 650 milliGRAM(s) Oral every 6 hours PRN Temp greater or equal to 38C (100.4F), Mild Pain (1 - 3)  dextrose 40% Gel 15 Gram(s) Oral once PRN Blood Glucose LESS THAN 70 milliGRAM(s)/deciliter  fentaNYL    Injectable 25 MICROGram(s) IV Push every 2 hours PRN Severe Pain (7 - 10)  glucagon  Injectable 1 milliGRAM(s) IntraMuscular once PRN Glucose LESS THAN 70 milligrams/deciliter  sodium chloride 0.9% lock flush 10 milliLiter(s) IV Push every 1 hour PRN Pre/post blood products, medications, blood draw, and to maintain line patency      PHYSICAL EXAM:  VITALS: T(C): 36.6 (06-08-20 @ 08:00)  T(F): 97.9 (06-08-20 @ 08:00), Max: 99.1 (06-07-20 @ 15:00)  HR: 106 (06-08-20 @ 12:35) (97 - 118)  BP: --  RR:  (16 - 30)  SpO2:  (97% - 100%)  Wt(kg): --  GENERAL: NAD, well-groomed, well-developed  EYES: No proptosis, no injection  HEENT:  Atraumatic, Normocephalic, moist mucous membranes  THYROID: Normal size, no palpable nodules  RESPIRATORY: Clear to auscultation bilaterally; No rales, rhonchi, wheezing, or rubs  CARDIOVASCULAR: Regular rate and rhythm; No murmurs; no peripheral edema  GI: Soft, nontender, non distended, normal bowel sounds  CUSHING'S SIGNS: no striae    POCT Blood Glucose.: 295 mg/dL (06-08-20 @ 12:48)  POCT Blood Glucose.: 210 mg/dL (06-08-20 @ 05:07)  POCT Blood Glucose.: 199 mg/dL (06-07-20 @ 23:37)  POCT Blood Glucose.: 231 mg/dL (06-07-20 @ 17:01)  POCT Blood Glucose.: 231 mg/dL (06-07-20 @ 11:35)  POCT Blood Glucose.: 174 mg/dL (06-07-20 @ 05:52)  POCT Blood Glucose.: 147 mg/dL (06-07-20 @ 02:46)  POCT Blood Glucose.: 66 mg/dL (06-07-20 @ 00:54)  POCT Blood Glucose.: 94 mg/dL (06-07-20 @ 00:28)  POCT Blood Glucose.: 77 mg/dL (06-07-20 @ 00:27)  POCT Blood Glucose.: 174 mg/dL (06-06-20 @ 17:04)  POCT Blood Glucose.: 288 mg/dL (06-06-20 @ 11:16)  POCT Blood Glucose.: 229 mg/dL (06-06-20 @ 05:09)  POCT Blood Glucose.: 92 mg/dL (06-05-20 @ 23:19)  POCT Blood Glucose.: 92 mg/dL (06-05-20 @ 23:18)  POCT Blood Glucose.: 187 mg/dL (06-05-20 @ 17:00)    06-08    142  |  104  |  19  ----------------------------<  210<H>  4.2   |  24  |  0.52    EGFR if : 119  EGFR if non : 102    Ca    9.7      06-08  Mg     2.1     06-08  Phos  2.7     06-08            Thyroid Function Tests:  06-04 @ 01:58 TSH -- FreeT4 2.7 T3 -- Anti TPO -- Anti Thyroglobulin Ab -- TSI --  05-30 @ 04:44 TSH 12.30 FreeT4 -- T3 51 Anti TPO -- Anti Thyroglobulin Ab -- TSI --

## 2020-06-08 NOTE — PROGRESS NOTE ADULT - PROBLEM SELECTOR PLAN 2
-continue synthroid 120mcg IVP daily. Will recheck in on 6/11 as her dose was decreased from 150mcg to 120mcg on 6/4.

## 2020-06-09 LAB
GLUCOSE BLDC GLUCOMTR-MCNC: 120 MG/DL — HIGH (ref 70–99)
GLUCOSE BLDC GLUCOMTR-MCNC: 151 MG/DL — HIGH (ref 70–99)
GLUCOSE BLDC GLUCOMTR-MCNC: 160 MG/DL — HIGH (ref 70–99)
GLUCOSE BLDC GLUCOMTR-MCNC: 175 MG/DL — HIGH (ref 70–99)

## 2020-06-09 PROCEDURE — 99291 CRITICAL CARE FIRST HOUR: CPT

## 2020-06-09 PROCEDURE — 99292 CRITICAL CARE ADDL 30 MIN: CPT

## 2020-06-09 PROCEDURE — 71045 X-RAY EXAM CHEST 1 VIEW: CPT | Mod: 26

## 2020-06-09 PROCEDURE — 99233 SBSQ HOSP IP/OBS HIGH 50: CPT

## 2020-06-09 PROCEDURE — 99232 SBSQ HOSP IP/OBS MODERATE 35: CPT

## 2020-06-09 RX ORDER — HUMAN INSULIN 100 [IU]/ML
48 INJECTION, SUSPENSION SUBCUTANEOUS
Refills: 0 | Status: DISCONTINUED | OUTPATIENT
Start: 2020-06-10 | End: 2020-06-15

## 2020-06-09 RX ORDER — DOXAZOSIN MESYLATE 4 MG
2 TABLET ORAL AT BEDTIME
Refills: 0 | Status: DISCONTINUED | OUTPATIENT
Start: 2020-06-09 | End: 2020-07-05

## 2020-06-09 RX ORDER — HUMAN INSULIN 100 [IU]/ML
44 INJECTION, SUSPENSION SUBCUTANEOUS
Refills: 0 | Status: DISCONTINUED | OUTPATIENT
Start: 2020-06-09 | End: 2020-06-11

## 2020-06-09 RX ORDER — HUMAN INSULIN 100 [IU]/ML
28 INJECTION, SUSPENSION SUBCUTANEOUS
Refills: 0 | Status: DISCONTINUED | OUTPATIENT
Start: 2020-06-09 | End: 2020-06-11

## 2020-06-09 RX ORDER — PANTOPRAZOLE SODIUM 20 MG/1
40 TABLET, DELAYED RELEASE ORAL DAILY
Refills: 0 | Status: DISCONTINUED | OUTPATIENT
Start: 2020-06-09 | End: 2020-06-13

## 2020-06-09 RX ORDER — LACOSAMIDE 50 MG/1
200 TABLET ORAL
Refills: 0 | Status: DISCONTINUED | OUTPATIENT
Start: 2020-06-09 | End: 2020-06-10

## 2020-06-09 RX ORDER — LEVOTHYROXINE SODIUM 125 MCG
150 TABLET ORAL DAILY
Refills: 0 | Status: DISCONTINUED | OUTPATIENT
Start: 2020-06-10 | End: 2020-07-11

## 2020-06-09 RX ADMIN — Medication 2 MILLIGRAM(S): at 21:22

## 2020-06-09 RX ADMIN — Medication 25 MILLIGRAM(S): at 05:19

## 2020-06-09 RX ADMIN — HUMAN INSULIN 40 UNIT(S): 100 INJECTION, SUSPENSION SUBCUTANEOUS at 23:51

## 2020-06-09 RX ADMIN — Medication 2: at 05:22

## 2020-06-09 RX ADMIN — ENOXAPARIN SODIUM 30 MILLIGRAM(S): 100 INJECTION SUBCUTANEOUS at 05:19

## 2020-06-09 RX ADMIN — HUMAN INSULIN 48 UNIT(S): 100 INJECTION, SUSPENSION SUBCUTANEOUS at 05:23

## 2020-06-09 RX ADMIN — CHLORHEXIDINE GLUCONATE 15 MILLILITER(S): 213 SOLUTION TOPICAL at 18:54

## 2020-06-09 RX ADMIN — LEVETIRACETAM 1000 MILLIGRAM(S): 250 TABLET, FILM COATED ORAL at 05:19

## 2020-06-09 RX ADMIN — Medication 15 MILLIGRAM(S): at 05:20

## 2020-06-09 RX ADMIN — LACOSAMIDE 200 MILLIGRAM(S): 50 TABLET ORAL at 05:19

## 2020-06-09 RX ADMIN — ERTAPENEM SODIUM 120 MILLIGRAM(S): 1 INJECTION, POWDER, LYOPHILIZED, FOR SOLUTION INTRAMUSCULAR; INTRAVENOUS at 13:01

## 2020-06-09 RX ADMIN — Medication 5 MILLIGRAM(S): at 21:23

## 2020-06-09 RX ADMIN — Medication 2: at 17:57

## 2020-06-09 RX ADMIN — AMLODIPINE BESYLATE 10 MILLIGRAM(S): 2.5 TABLET ORAL at 05:19

## 2020-06-09 RX ADMIN — CHLORHEXIDINE GLUCONATE 1 APPLICATION(S): 213 SOLUTION TOPICAL at 21:24

## 2020-06-09 RX ADMIN — SIMVASTATIN 20 MILLIGRAM(S): 20 TABLET, FILM COATED ORAL at 21:23

## 2020-06-09 RX ADMIN — Medication 25 MILLIGRAM(S): at 17:56

## 2020-06-09 RX ADMIN — LEVETIRACETAM 1000 MILLIGRAM(S): 250 TABLET, FILM COATED ORAL at 17:56

## 2020-06-09 RX ADMIN — PANTOPRAZOLE SODIUM 40 MILLIGRAM(S): 20 TABLET, DELAYED RELEASE ORAL at 11:31

## 2020-06-09 RX ADMIN — HUMAN INSULIN 44 UNIT(S): 100 INJECTION, SUSPENSION SUBCUTANEOUS at 14:53

## 2020-06-09 RX ADMIN — Medication 2: at 11:31

## 2020-06-09 RX ADMIN — LACOSAMIDE 200 MILLIGRAM(S): 50 TABLET ORAL at 17:57

## 2020-06-09 RX ADMIN — CHLORHEXIDINE GLUCONATE 15 MILLILITER(S): 213 SOLUTION TOPICAL at 05:19

## 2020-06-09 RX ADMIN — ENOXAPARIN SODIUM 30 MILLIGRAM(S): 100 INJECTION SUBCUTANEOUS at 17:58

## 2020-06-09 RX ADMIN — HUMAN INSULIN 28 UNIT(S): 100 INJECTION, SUSPENSION SUBCUTANEOUS at 17:57

## 2020-06-09 NOTE — CONSULT NOTE ADULT - ASSESSMENT
Bioethics analysis:  THE CENTRAL ETHICAL ISSUE IS AUTONOMY AND SURROGATE DECISION MAKING FOR A PATIENT WITHOUT CAPACITY    The primary ethical principle involved in this case is respect for patient autonomy vs. beneficence and non-maleficence.     The bioethical principle of autonomy – here represented by a thoughtful consideration of the appropriate surrogate to make decisions for a patient without capacity. Physicians and health care providers have a role in protecting the patient from harm by safeguarding the patient’s best interests through determining the appropriate qualifications surrogate decision maker. In this case, the health team is applauded for creating an atmosphere of clarity in communication regarding finding the appropriate primary surrogate decision maker. The patient when she was able to do so, appointed both her son and daughter in a living will as her decision makers. At this juncture, the [facility] Decision Act supports this decision.     Since the patient is without capacity, informed consent for medical procedures, including discharge planning, would need to be obtained from a surrogate decision-maker. The 2010 [facility] Care Decision Act (CDA) addresses the situation of a sick individual who lacks capacity, but has an available surrogate: the surrogate has the exclusive right to make decisions about whether to use or forgo life sustaining treatments (LSTs) when it appears that the patient will die without them. The ethical standard the surrogate is supposed to apply to such decision-making is, first "substituted judgment" – based on the patient’s prior articulation of preferences for such a situation or second "best interests". The [facility] establishes a hierarchy of surrogacy, in order of priority decision maker for the incapable patient:     – an [facility] Article 81 court-appointed guardian (in this case, there is not one);   – the spouse or domestic partner as defined in the CDA who is either ([facility] § 2994-a);   – an adult child;   – a parent;   – a brother or sister;   – a close friend which includes all other family members and friends.1    Generally, the bioethical principle of beneficence promotes the best possible health or quality of living or dying: with aggressive interventions when these help prolong life with "good quality," with comfort care when cure and remission are not possible and the aim is to achieve the best "quality of life possible given the current circumstances". This principle tends to go hand-in-hand with the other bioethical principle non-maleficence, or do no harm. This principle promotes the physician’s virtue to provide care that wouldn’t cause harm to the patient or provide care that would not be medically beneficial to the patient.2    In keeping with the duty of non-maleficence, there is an ethical dilemma in this case between the technically possible (LSTs) and medically reasonable (best available evidence that the intervention will result in improved outcome). An acceptable outcome can be defined from the patient’s and HCP’s perspective by quality of life (the ability to engage in life tasks and derive satisfaction from doing so or by resulting functional status allows patient to engage in valued life tasks and derive sufficient satisfaction from doing so) Quality of life judgments rests in the judgment of the patient and surrogates reliable account of the patient’s valued life tasks and functional status. Physicians have no obligation to provide non-beneficial treatments without an expectation of benefit concordant with the goal of intervention. Another approach is to review this patient’s prognosis – progressive continual decline in health status regardless of intervention. 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 3    However, it is reasonable to consider palliation and burden of suffering. In honoring the patient’s moral status, the issue remains that the patient has a poor prognosis, will progressively deteriorate that will require comfort and ease of suffering. In this case, it may be morally challenging to watch this patient receive variable life sustaining treatments, yet suffering and quality of life decisions are best made by her surrogate decision makers.    The health team is commended for their virtue in providing intensive care treatment that has allowed this patient to survive to this point. In this specific case, in accordance with the PALLIATIVE CARE ACCESS ACT ([facility] SECTION 2997-D) The attending health care practitioner is required to provide patients with a terminal illness not only prognosis, risks and benefits of treatment options but also patient’s legal rights to symptom management at the end of life. Fundamental to a palliative approach is the aim to reduce suffering and improve the quality of life for patients and their families through identification and management of pain and physical, social, psychological, spiritual, [place] and cultural needs.    CONCLUSION  In today’s phone call, Heavenly Akins, the patient’s daughter was able to ascertain and speak to her mother’s current medical condition as well as speak to the team regarding what her mother has determined what she considers is her quality of life (being able to speak and not be attached to machines.)  Heavenly acknowledged that these are hard decisions, but she won’t make them on her own. She needs the tincture of time to fully discuss with her family (and acknowledges that this seems to be a common statement from both her and Norris, but it is truth to her and her siblings.) As the patient has been in a gradual decline regarding her health, the issue of preparing the family for her eventual death in the form of pre-bereavement counseling should be considered.1    Ethics to remain available.     Thank you for this challenging consult.     Sneha Altamirano M.Div., BSN, PCCN, DARIA-C   Clinical Ethics Consultant   Division of Medical Ethics   [phone]      WRITTEN BY GALI OWENS, BSSO, DARIA-C   DISCUSSED WITH DAVINA TIMMONS  MORE THAN 50% OF THE TIME OF THIS CONSULTATION WAS SPENT IN COORDINATION OF CARE OF PATIENT    REFERENCES    1 [place]PH[place]) Art. 29-CC ([place] 2010); [place]’s Family Health Care Decisions [place], [place] [facility] Jun. 2010, at 18.     2 Maxime, A. S., & PEYMAN Londono. (2012). Addressing requests by patients for nonbeneficial interventions. Neri, 307(2), 149-150.     3 ELIAZAR Silveira, Siegler, M., & AMBER Rodriguez (2015). Clinical Ethics: A Practical Approach to Ethical Decisions in Clinical Medicine, 8E. [facility] Professional.    4 Monik Salas, Paddy Sanders, Alexus Holland, Angie Ray, Parul Heaton, Family Caregivers’ Preparations for Death: A Qualitative Analysis, Journal of Pain and Symptom Management, Volume 55, Issue 6, 2018, Pages 7892-4338 ISSN [id number]

## 2020-06-09 NOTE — PROGRESS NOTE ADULT - SUBJECTIVE AND OBJECTIVE BOX
Diabetes Follow up note:    Chief complaint: T2DM w/steroid induced hyperglycemia.     Interval Hx: BG values improved on adjusted regimen. Pt remains intubated on continuous tube feeds at goal. RN @ bedside providing information.     Review of Systems:  intubated. Unresponsive. Unable to provide ROS.   MEDS:    insulin lispro (HumaLOG) corrective regimen sliding scale   SubCutaneous every 6 hours  insulin NPH human recombinant 30 Unit(s) SubCutaneous <User Schedule>  insulin NPH human recombinant 48 Unit(s) SubCutaneous <User Schedule>  insulin NPH human recombinant 40 Unit(s) SubCutaneous <User Schedule>  levothyroxine Injectable 120 MICROGram(s) IV Push at bedtime  predniSONE   Tablet 15 milliGRAM(s) Oral <User Schedule>  predniSONE   Tablet 5 milliGRAM(s) Oral at bedtime  simvastatin 20 milliGRAM(s) Oral at bedtime    ertapenem  IVPB 1000 milliGRAM(s) IV Intermittent <User Schedule>    Allergies    Depakote (Other)  Seroquel (Other (Severe))      PE:  General: Female lying in bed. Intubated.   Vital Signs Last 24 Hrs  T(C): 36.8 (09 Jun 2020 10:00), Max: 36.9 (08 Jun 2020 16:00)  T(F): 98.2 (09 Jun 2020 10:00), Max: 98.4 (08 Jun 2020 16:00)  HR: 108 (09 Jun 2020 10:00) (101 - 119)  BP: --  BP(mean): --  RR: 20 (09 Jun 2020 10:00) (16 - 35)  SpO2: 100% (09 Jun 2020 10:00) (99% - 100%)  HEENT: ETT/OGT in place. On vent.   Abd: Soft, NT,ND, Obese.   Extremities: Warm  Neuro: Unresponsive.     LABS:  POCT Blood Glucose.: 151 mg/dL (06-09-20 @ 05:13)  POCT Blood Glucose.: 111 mg/dL (06-08-20 @ 23:47)  POCT Blood Glucose.: 167 mg/dL (06-08-20 @ 18:23)  POCT Blood Glucose.: 295 mg/dL (06-08-20 @ 12:48)  POCT Blood Glucose.: 210 mg/dL (06-08-20 @ 05:07)  POCT Blood Glucose.: 199 mg/dL (06-07-20 @ 23:37)  POCT Blood Glucose.: 231 mg/dL (06-07-20 @ 17:01)  POCT Blood Glucose.: 231 mg/dL (06-07-20 @ 11:35)  POCT Blood Glucose.: 174 mg/dL (06-07-20 @ 05:52)  POCT Blood Glucose.: 147 mg/dL (06-07-20 @ 02:46)  POCT Blood Glucose.: 66 mg/dL (06-07-20 @ 00:54)  POCT Blood Glucose.: 94 mg/dL (06-07-20 @ 00:28)  POCT Blood Glucose.: 77 mg/dL (06-07-20 @ 00:27)  POCT Blood Glucose.: 174 mg/dL (06-06-20 @ 17:04)                            9.2    11.40 )-----------( 482      ( 08 Jun 2020 20:43 )             30.0       06-08    143  |  109<H>  |  20  ----------------------------<  128<H>  3.9   |  22  |  0.51    Ca    9.3      08 Jun 2020 20:43  Phos  2.6     06-08  Mg     2.0     06-08        Thyroid Function Tests:  06-04 @ 01:58 TSH -- FreeT4 2.7 T3 -- Anti TPO -- Anti Thyroglobulin Ab -- TSI --  05-30 @ 04:44 TSH 12.30 FreeT4 -- T3 51 Anti TPO -- Anti Thyroglobulin Ab -- TSI --      A1C with Estimated Average Glucose Result: 7.4 % (05-30-20 @ 04:43)  A1C with Estimated Average Glucose Result: 6.4 % (05-14-20 @ 16:23)  A1C with Estimated Average Glucose Result: 6.5 % (05-13-20 @ 08:24)  Hemoglobin A1C, Whole Blood: 9.0 % (01-28-20 @ 19:30)  Hemoglobin A1C, Whole Blood: 9.2 % (01-27-20 @ 17:36)          Contact number: bhavna [phone] or [phone]

## 2020-06-09 NOTE — CHART NOTE - NSCHARTNOTEFT_GEN_A_CORE
Nutrition Follow Up Note     Patient seen for: nutrition follow up on ICU    Source: patient, medical record, communication with team.     Chart reviewed, events noted. Pt is a 65 yo F with PMH: hypothyroidism, HTN, IDDM, COPD/CHRIS on home O2, RA, SBO s/p ex lap with lysis of adhesions c/b evisceration. 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 Pt admitted with possible seizure activity at home; found to have L IPH with IVH and SAH extension. Intubated at OSH and transferred to [facility]. Pt placed on vEEG; no surgical intervention noted at this time. Pt being followed by endocrinology for appropriate antihyperglycemic regimen. Currently on NPH and Humalog as ordered. Palliative care and ethics team involved in decision making, goals of care. Trach and PEG being discussed with family.      Diet Order: Diet, NPO with Tube Feed:   Tube Feeding Modality: Orogastric  Glucerna 1.2 Dewey (GLUCERNARTH)  Total Volume for 24 Hours (mL): 1560  Continuous  Starting Tube Feed Rate {mL per Hour}: 20  Increase Tube Feed Rate by (mL): 10     Every 4 hours  Until Goal Tube Feed Rate (mL per Hour): 65  Tube Feed Duration (in Hours): 24  Tube Feed Start Time: 23:00 (06-02-20 @ 11:06)    CURRENT EN ORDER PROVIDES: (based on upper  lbs/60kg): 1560ml, 1872kcal (31kcal/kg) and 94g protein (1.6g protein/kg)    EN provision (per nursing flow sheet):   (6/9): 520ml (thus far today)  (6/8): 1495ml (96% of goal)  (6/7): 1560ml (100% of goal)  (6/6): 1560ml (100% of goal)    Last BM (6/9): x 1; (6/8): x 3; (6/7): x 1. Not on prescribed bowel regimen at this time.     Anthropometric Measurements:   Height (cm): 162.6 (05-30-20 @ 00:49), 152.4 (05-29-20 @ 19:18), 154.9 (05-12-20 @ 21:48)  Weight (kg): 99.6 (05-30-20 @ 00:49), , 98.2 (05-12-20 @ 23:57)  BMI (kg/m2): 37.7 (05-30-20 @ 00:49), 51.5 (05-30-20 @ 00:49), 58.6 (05-29-20 @ 19:18), 40.9 (05-12-20 @ 23:57)    Medications: MEDICATIONS  (STANDING):  amLODIPine   Tablet 10 milliGRAM(s) Oral daily  chlorhexidine 0.12% Liquid 15 milliLiter(s) Oral Mucosa every 12 hours  chlorhexidine 4% Liquid 1 Application(s) Topical <User Schedule>  dextrose 5%. 1000 milliLiter(s) (50 mL/Hr) IV Continuous <Continuous>  dextrose 50% Injectable 12.5 Gram(s) IV Push once  dextrose 50% Injectable 25 Gram(s) IV Push once  dextrose 50% Injectable 25 Gram(s) IV Push once  enoxaparin Injectable 30 milliGRAM(s) SubCutaneous two times a day  ertapenem  IVPB 1000 milliGRAM(s) IV Intermittent <User Schedule>  insulin lispro (HumaLOG) corrective regimen sliding scale   SubCutaneous every 6 hours  insulin NPH human recombinant 30 Unit(s) SubCutaneous <User Schedule>  insulin NPH human recombinant 48 Unit(s) SubCutaneous <User Schedule>  insulin NPH human recombinant 40 Unit(s) SubCutaneous <User Schedule>  lacosamide Solution 200 milliGRAM(s) Oral two times a day  levETIRAcetam  Solution 1000 milliGRAM(s) Oral two times a day  levothyroxine Injectable 120 MICROGram(s) IV Push at bedtime  metoprolol tartrate 25 milliGRAM(s) Oral two times a day  pantoprazole  Injectable 40 milliGRAM(s) IV Push daily  predniSONE   Tablet 5 milliGRAM(s) Oral at bedtime  predniSONE   Tablet 15 milliGRAM(s) Oral <User Schedule>  simvastatin 20 milliGRAM(s) Oral at bedtime    MEDICATIONS  (PRN):  acetaminophen    Suspension .. 650 milliGRAM(s) Oral every 6 hours PRN Temp greater or equal to 38C (100.4F), Mild Pain (1 - 3)  dextrose 40% Gel 15 Gram(s) Oral once PRN Blood Glucose LESS THAN 70 milliGRAM(s)/deciliter  fentaNYL    Injectable 25 MICROGram(s) IV Push every 2 hours PRN Severe Pain (7 - 10)  glucagon  Injectable 1 milliGRAM(s) IntraMuscular once PRN Glucose LESS THAN 70 milligrams/deciliter  sodium chloride 0.9% lock flush 10 milliLiter(s) IV Push every 1 hour PRN Pre/post blood products, medications, blood draw, and to maintain line patency    Labs: 06-08 @ 20:43: Sodium 143, Potassium 3.9, Calcium 9.3, Magnesium 2.0, Phosphorus 2.6, BUN 20, Creatinine 0.51, Glucose 128<H>, Alk Phos --, ALT/SGPT --, AST/SGOT --, Albumin --, Prealbumin --, Total Bilirubin --, Hemoglobin 9.2<L>, Hematocrit 30.0<L>, Ferritin --, C-Reactive Protein --, Creatine Kinase <<27>    Triglycerides, Serum: 326 mg/dL (05-30-20 @ 04:52)    POCT Blood Glucose.: 151 mg/dL (06-09-20 @ 05:13)  POCT Blood Glucose.: 111 mg/dL (06-08-20 @ 23:47)  POCT Blood Glucose.: 167 mg/dL (06-08-20 @ 18:23)  POCT Blood Glucose.: 295 mg/dL (06-08-20 @ 12:48)    Skin per nursing documentation: no pressure injuries documented  Edema: 1+ generalized    Estimated Needs: (based on upper  lbs / 59.8kg)  Energy: (30-35kcal/kg): 1794-2093kcal  Protein: (1.4-1.6g protein/kg): 94-96g protein     Previous Nutrition Diagnosis: Increased nutrient needs  Nutrition Diagnosis is: Ongoing, with provision of enteral nutrition     New Nutrition Diagnosis:  n/a     Recommended Interventions:   1. Continue Glucerna 1.2 at goal rate 65ml/hr x 24 hrs. Will provid (based on upper IBW 59.8kg) : 1560ml, 1872kcal (31kcal/kg) and 94g protein (1.6g protein/kg)  2. Monitor GI tolerance. RD to remain available to adjust EN formulary, volume/rate PRN.   3. Trend BG levels, renal indices, LFT's, electrolytes and triglycerides   4. Monitor wt trends, nutrition related labs, skin integrity, hydration status and bowel regularity.   5. Determine nutritional goals of care.       Monitoring and Evaluation:   Continue to monitor nutrition provision and tolerance, weights, labs, skin integrity.   RD remains available upon request and will follow up per protocol.    Alison Kleiner RD, [facility] ([phone])

## 2020-06-09 NOTE — PROGRESS NOTE ADULT - ASSESSMENT
63 yo female with T2D uncontrolled while inpatient (HbA1c 7.4%) with no known complications and hypothyroidism here with mental status changes associated with shaking found to have a left frontal parenchymal hematoma with mass effect on the left lateral ventricle and intraventricular extension with blood layering in the occipital horns. Endocrine following for hyperglycemia on chronic steroids. Tolerating TF @ goal. Will adjust regimen based on last 24 hours of BG levels. BG goal (100-200mg/dl).

## 2020-06-09 NOTE — CONSULT NOTE ADULT - SUBJECTIVE AND OBJECTIVE BOX
CLINICAL SUMMARY  64 year old female admitted with change in mental status and possible seizure activity. Found on CT Scan to have left frontal intracerebral hemorrhage and cerebral edema which was not present on a February 20, 2020 CT Scan.  Patient intubated and sedated in neurosurgical ICU.   Patient and family are well known to the Ethics Service (Feb/March 2019 and January 2020) with a PMH of functional paraplegia (bedbound), hypothyroidism, pulmonary hypertension (HTN), Diabetes Mellitus (DM) on insulin, COPD/CHRIS (on nocturnal CPAP and 3L home oxygen), Rheumatoid Arthritis on chronic prednisone, chronic tremors, Small Bowel Obstruction s/p exploratory laparotomy with lysis of adhesions (2018) (4 retention sutures) complicated by evisceration secondary to a coughing episode s/p exploratory laparotomy(2018) (6 retention sutures) complicated by multifocal pneumonia, developed hypercapnic respiratory failure requiring intubation 1/6/19, s/p bronch 1/9/19, and extubated on 1/24/19, course further complicated by Enterococci bacteremia presented to the ED for altered mental status x 3 weeks.   During this admission, the team has been primarily in contact with son Norris who has told the team today that he needs the weekend to discuss with family and for them to not call him. Team is concerned over loss of contact with surrogate decision maker and reached out to Ethics as a resource.  Initial Ethics consult deemed that patient’s living will from 9/2019 which has both Norris and Heavenly as co-decision makers.   Team spoke with Heavenly and set up 2pm phone conference on June 8, 2020.  Prognosis Estimate (survival in hrs, days, wks, mos, yrs):  guarded   Patient Decision-Making Capacity:  Has Capacity  No capacity- medically sedated and intubated  Patient Aware of:  Diagnosis:  Yes   No   Unknown    Prognosis:   Yes    No   Unknown       Name of medical decision-maker should patient lack capacity (relationship): Heavenly Hernández, Daughter [phone] and Norris Rush III, Son [phone]. Both named as equal decision makers in Living Will dated 9/3/19.  Role:   Health Care Agent     Legal Surrogate     Other Decision-Maker (i.e., HCA or Surrogate) Aware of:  Diagnosis: Yes   No      Prognosis:   Yes     No  Other Stake-Holders:    Evidence of Patient’s Preference of Life-Sustaining Treatment (Written or Oral): LIVING WILL SEPT 2019  Resuscitation status:   DNR:  Yes   No      DNI:  Yes   No            Discussion:   6/8/2020 [id number] phone conference with daughter Heavenly, Lilo Mary Janefilomena, NP Palliative Medicine, Dr. Yas Ruggiero, Neurology Fellow, BHAVIN Briones and Ethics Attending SAM Altamirano.     Heavenly was able to speak to her mother's current condition and asked pertinent questions regarding prognosis and spoke about her mother's concept of quality of life.  All questions answered regarding tracheostomy and percutaneous endoscopic gastrostomy tube for enteral nutrition and Heavenly was able to speak back and acknowledge that her mother's concept of quality of life would include being able to speak and not be dependent on machines. Also questioned whether her mother would even be able to have a PEG considering the abdominal wound. Heavenly expressed gratefulness for this additional conversation and will maintain contact. She has been in conversation with all her siblings and they do understand the gravity of the situation, that the stroke has changed their mother’s health status.   All contact information given and Ethics will remain available as decision points occur

## 2020-06-09 NOTE — PROGRESS NOTE ADULT - SUBJECTIVE AND OBJECTIVE BOX
EVENTS: failed CPAP trial     T(C): 37 (06-09-20 @ 16:00), Max: 37 (06-09-20 @ 15:00)  HR: 118 (06-09-20 @ 16:13) (101 - 118)  BP: --  RR: 23 (06-09-20 @ 16:00) (16 - 38)  SpO2: 100% (06-09-20 @ 16:13) (99% - 100%)  06-08-20 @ 07:01  -  06-09-20 @ 07:00  --------------------------------------------------------  IN: 1910 mL / OUT: 1304 mL / NET: 606 mL    06-09-20 @ 07:01  -  06-09-20 @ 17:56  --------------------------------------------------------  IN: 740 mL / OUT: 752 mL / NET: -12 mL    acetaminophen    Suspension .. 650 milliGRAM(s) Oral every 6 hours PRN  amLODIPine   Tablet 10 milliGRAM(s) Oral daily  chlorhexidine 0.12% Liquid 15 milliLiter(s) Oral Mucosa every 12 hours  chlorhexidine 4% Liquid 1 Application(s) Topical <User Schedule>  dextrose 40% Gel 15 Gram(s) Oral once PRN  dextrose 5%. 1000 milliLiter(s) IV Continuous <Continuous>  dextrose 50% Injectable 12.5 Gram(s) IV Push once  dextrose 50% Injectable 25 Gram(s) IV Push once  dextrose 50% Injectable 25 Gram(s) IV Push once  doxazosin 2 milliGRAM(s) Oral at bedtime  enoxaparin Injectable 30 milliGRAM(s) SubCutaneous two times a day  ertapenem  IVPB 1000 milliGRAM(s) IV Intermittent <User Schedule>  fentaNYL    Injectable 25 MICROGram(s) IV Push every 2 hours PRN  glucagon  Injectable 1 milliGRAM(s) IntraMuscular once PRN  insulin lispro (HumaLOG) corrective regimen sliding scale   SubCutaneous every 6 hours  insulin NPH human recombinant 44 Unit(s) SubCutaneous <User Schedule>  insulin NPH human recombinant 28 Unit(s) SubCutaneous <User Schedule>  insulin NPH human recombinant 40 Unit(s) SubCutaneous <User Schedule>  lacosamide 200 milliGRAM(s) Oral two times a day  levETIRAcetam  Solution 1000 milliGRAM(s) Oral two times a day  metoprolol tartrate 25 milliGRAM(s) Oral two times a day  pantoprazole  Injectable 40 milliGRAM(s) IV Push daily  predniSONE   Tablet 15 milliGRAM(s) Oral <User Schedule>  predniSONE   Tablet 5 milliGRAM(s) Oral at bedtime  simvastatin 20 milliGRAM(s) Oral at bedtime  sodium chloride 0.9% lock flush 10 milliLiter(s) IV Push every 1 hour PRN  Mode: CPAP with PS, FiO2: 30, PEEP: 5, PS: 15, MAP: 12, PIP: 21  PHYSICAL EXAMINATION:   General: No Acute Distress   Neurological: Intubated, pupils 3mm and reactive, +corneal reflexes, +cough/gag, +overbreathing vent set rate, not following commands, left gaze preference, RUE trace flexion movement, LUE WD- flexion movement, BLEs TF L>R  Pulmonary: Clear to Auscultation, No Rales, No Rhonchi, No Wheezes   Cardiovascular: S1, S2, Regular Rate and Rhythm   Gastrointestinal: Soft, Nontender, Nondistended       LABS:  Na: 143 (06-08 @ 20:43), 142 (06-08 @ 01:55), 144 (06-07 @ 01:02)  K: 3.9 (06-08 @ 20:43), 4.2 (06-08 @ 01:55), 3.8 (06-07 @ 01:02)  Cl: 109 (06-08 @ 20:43), 104 (06-08 @ 01:55), 106 (06-07 @ 01:02)  CO2: 22 (06-08 @ 20:43), 24 (06-08 @ 01:55), 23 (06-07 @ 01:02)  BUN: 20 (06-08 @ 20:43), 19 (06-08 @ 01:55), 19 (06-07 @ 01:02)  Cr: 0.51 (06-08 @ 20:43), 0.52 (06-08 @ 01:55), 0.53 (06-07 @ 01:02)  Glu: 128(06-08 @ 20:43), 210(06-08 @ 01:55), 101(06-07 @ 01:02)    Hgb: 9.2 (06-08 @ 20:43), 10.4 (06-08 @ 01:55), 9.0 (06-07 @ 01:02)  Hct: 30.0 (06-08 @ 20:43), 33.8 (06-08 @ 01:55), 28.3 (06-07 @ 01:02)  WBC: 11.40 (06-08 @ 20:43), 11.05 (06-08 @ 01:55), 12.55 (06-07 @ 01:02)  Plt: 482 (06-08 @ 20:43), 457 (06-08 @ 01:55), 440 (06-07 @ 01:02)    INR:   PTT:       EXAM:  CT BRAIN                            PROCEDURE DATE:  06/02/2020            INTERPRETATION:  INDICATIONS:  left ich    TECHNIQUE:  Serial axial images were obtained from the skull base to the vertex without intravenous contrast.    COMPARISONEXAMINATION: 6/1/2020    FINDINGS:    VENTRICLES AND SULCI:  Unchanged mass effect on the left frontal horn of the lateral ventricle related to the left frontal hematoma as seen on the prior without significant interval change compared with the priorstudy. Small amount of layering hemorrhage in the occipital horns of the lateral ventricle  INTRA-AXIAL: No change in appearance of large left frontal hematoma with mass effect on the left frontal horn. Some associated subarachnoid hemorrhage is seen  EXTRA-AXIAL:  Subtle subarachnoid hemorrhage in the left sylvian region and in the interhemispheric fissure as seen on the prior as well.  VISUALIZED SINUSES:  Clear.  VISUALIZED MASTOIDS:  Clear.  CALVARIUM:  Normal.  MISCELLANEOUS: Patient is intubated with an NG tube    IMPRESSION:  No change from 6/1/2020    Paraneoplastic Autoantibody Evaluation, Spinal Fluid (02.06.20 @ 19:15)    Paraneoplastic Interpretation, CSF: See Note: No informative autoantibodies were detected in the  Paraneoplastic Evaluation. However, a negative result does  not exclude neurological autoimmunity with or without  associated neoplasia. Sensitivity and specificity of  antibody testing are enhancedby testing both serum and  CSF.    Anti-Neuronal Nuclear Ab, Type 1: Negative titer    Anti-Neuronal Nuclear Ab, Type 2: Negative: -------------------ADDITIONAL INFORMATION-------------------  This test was developed and its performance characteristics  determined by [facility] in a manner consistent with CLIA  requirements. This test has not been cleared or approved by  the U.S. Food and Drug Administration. titer    Anti-Neuronal Nuclear Ab, Type 3: Negative: -------------------ADDITIONAL INFORMATION-------------------  This test was developed and its performance characteristics  determined by [facility] in a manner consistent with CLIA  requirements. This test has not been cleared or approved by  the U.S. Food and Drug Administration. titer    Anti-Glial Nuclear Ab, Type 1: Negative: -------------------ADDITIONAL INFORMATION-------------------  This test was developed and its performance characteristics  determined by [facility] in a manner consistent with CLIA  requirements. This test has not been cleared or approved by  the U.S. Food and Drug Administration. titer    Purkinje Cell Cytoplasmic Ab Type 1: Negative: -------------------ADDITIONAL INFORMATION-------------------  This test was developed and its performance characteristics  determined by [facility] in a manner consistent with CLIA  requirements. This test has not been cleared or approved by  the U.S. Food and Drug Administration. titer    Purkinje Cell Cytoplasmic Ab Type 2: Negative: -------------------ADDITIONAL INFORMATION-------------------  This test was developed and its performance characteristics  determined by [facility] in a manner consistent with CLIA  requirements. This test has not been cleared or approved by  the U.S. Food and Drug Administration.  Test Performed by:  [facility] - [address] [address]  : Wagner Fernández M.D. Ph.D.; CLIA# [id number] titer    Purkinje Cell Cytoplasmic Ab Type Tr: Negative: -------------------ADDITIONAL INFORMATION-------------------  This test was developed and its performance characteristics  determined by [facility] in a manner consistent with CLIA  requirements. This test has not been cleared or approved by  the U.S. Food and Drug Administration. titer    Amphiphysin Ab, CSF: Negative: -------------------ADDITIONAL INFORMATION-------------------  This test was developed and its performance characteristics  determined by [facility] in a manner consistent with CLIA  requirements. This test has not been cleared or approved by  the U.S. Food and Drug Administration. titer    CRMP-5-IgG, CSF: Negative: -------------------ADDITIONAL INFORMATION-------------------  This test was developed and its performance characteristics  determined by [facility] in a manner consistent with CLIA  requirements. This test has not been cleared or approved by  the U.S. Food and Drug Administration. titer    Reflex Added: None.: -------------------ADDITIONAL INFORMATION-------------------  This test was developed and its performance characteristics  determined by [facility] in a manner consistent with CLIA  requirements. This test has not been cleared or approved by  the U.S. Food and Drug Administration.        Assessment and Plan:   · Assessment	  ASSESSMENT:  Left frontal intracerebral hemorrhage, brain edema, brain shift,  generalized periodic discharges, UTI     NEURO:  Q4 neurochecks  Cont Keppra and Vimpat for GPDs/risk for seizures  Pain management with Tylenol.   Activity: [] OOB as tolerated [X] Bedrest [X] PT [X] OT [] PMNR    PULM:  Intubated since 5/29  History of COPD   failed CPAP  will likely need tarch   ABg and adjust vent settings accordingly       CV:  Keep -160  TTE negative for PFO  HTN: Continue norvasc and metoprolol    Statin for HLD     RENAL:  Na wnl  IVL   Start low dose cardura re: urine retention    GI:  Diet: On Glucerna Tube feeds  Last BM 6/7  GI prophylaxis [] not indicated [X] PPI [] other:      ENDO:   Better control  Continue synthroid. Change to PO  Continue prednisone 15mg, 5 mg at bedtime (was on it at home for RA)  Goal euglycemia (-180)  12am: NPH 40 units  6am: NPH 48 units  12pm: 44 units  6pm: 28 units    HEME/ONC:  On SQL.  VTE prophylaxis: [X] SCDs [X] chemoprophylaxis [] high risk of DVT/PE on admission due to:  LE doppler 5/30- No DVT  Anemia- improved     ID:  Afebrile   Ertapnem for ESBL UTI       MISC: Palliative Care consulted. Family meeting 6/9 with daughter Heavenly BHAVIN, Ethics and palliative care.   Follow up meeting on 6/9      SOCIAL/FAMILY:  [X] updated family via phone  [] family meeting    CODE STATUS:  [X] Full Code [] DNR [] DNI [] Palliative/Comfort Care    DISPOSITION:  [X] ICU [] Stroke Unit [] Floor [] EMU [] RCU [] PCU    [X] Patient is at high risk of neurologic deterioration/death due to: resp failure, seizures, hemorrhage.     Time spent:  30 critical care minutes EVENTS: failed CPAP trial     T(C): 37 (06-09-20 @ 16:00), Max: 37 (06-09-20 @ 15:00)  HR: 118 (06-09-20 @ 16:13) (101 - 118)  BP: --  RR: 23 (06-09-20 @ 16:00) (16 - 38)  SpO2: 100% (06-09-20 @ 16:13) (99% - 100%)  06-08-20 @ 07:01  -  06-09-20 @ 07:00  --------------------------------------------------------  IN: 1910 mL / OUT: 1304 mL / NET: 606 mL    06-09-20 @ 07:01  -  06-09-20 @ 17:56  --------------------------------------------------------  IN: 740 mL / OUT: 752 mL / NET: -12 mL    acetaminophen    Suspension .. 650 milliGRAM(s) Oral every 6 hours PRN  amLODIPine   Tablet 10 milliGRAM(s) Oral daily  chlorhexidine 0.12% Liquid 15 milliLiter(s) Oral Mucosa every 12 hours  chlorhexidine 4% Liquid 1 Application(s) Topical <User Schedule>  dextrose 40% Gel 15 Gram(s) Oral once PRN  dextrose 5%. 1000 milliLiter(s) IV Continuous <Continuous>  dextrose 50% Injectable 12.5 Gram(s) IV Push once  dextrose 50% Injectable 25 Gram(s) IV Push once  dextrose 50% Injectable 25 Gram(s) IV Push once  doxazosin 2 milliGRAM(s) Oral at bedtime  enoxaparin Injectable 30 milliGRAM(s) SubCutaneous two times a day  ertapenem  IVPB 1000 milliGRAM(s) IV Intermittent <User Schedule>  fentaNYL    Injectable 25 MICROGram(s) IV Push every 2 hours PRN  glucagon  Injectable 1 milliGRAM(s) IntraMuscular once PRN  insulin lispro (HumaLOG) corrective regimen sliding scale   SubCutaneous every 6 hours  insulin NPH human recombinant 44 Unit(s) SubCutaneous <User Schedule>  insulin NPH human recombinant 28 Unit(s) SubCutaneous <User Schedule>  insulin NPH human recombinant 40 Unit(s) SubCutaneous <User Schedule>  lacosamide 200 milliGRAM(s) Oral two times a day  levETIRAcetam  Solution 1000 milliGRAM(s) Oral two times a day  metoprolol tartrate 25 milliGRAM(s) Oral two times a day  pantoprazole  Injectable 40 milliGRAM(s) IV Push daily  predniSONE   Tablet 15 milliGRAM(s) Oral <User Schedule>  predniSONE   Tablet 5 milliGRAM(s) Oral at bedtime  simvastatin 20 milliGRAM(s) Oral at bedtime  sodium chloride 0.9% lock flush 10 milliLiter(s) IV Push every 1 hour PRN  Mode: CPAP with PS, FiO2: 30, PEEP: 5, PS: 15, MAP: 12, PIP: 21  PHYSICAL EXAMINATION:   General: No Acute Distress   Neurological: Intubated, pupils 3mm and reactive, +corneal reflexes, +cough/gag, +overbreathing vent set rate, not following commands, left gaze preference, RUE trace flexion movement, LUE WD- flexion movement, BLEs TF L>R  Pulmonary: Clear to Auscultation, No Rales, No Rhonchi, No Wheezes   Cardiovascular: S1, S2, Regular Rate and Rhythm   Gastrointestinal: Soft, Nontender, Nondistended       LABS:  Na: 143 (06-08 @ 20:43), 142 (06-08 @ 01:55), 144 (06-07 @ 01:02)  K: 3.9 (06-08 @ 20:43), 4.2 (06-08 @ 01:55), 3.8 (06-07 @ 01:02)  Cl: 109 (06-08 @ 20:43), 104 (06-08 @ 01:55), 106 (06-07 @ 01:02)  CO2: 22 (06-08 @ 20:43), 24 (06-08 @ 01:55), 23 (06-07 @ 01:02)  BUN: 20 (06-08 @ 20:43), 19 (06-08 @ 01:55), 19 (06-07 @ 01:02)  Cr: 0.51 (06-08 @ 20:43), 0.52 (06-08 @ 01:55), 0.53 (06-07 @ 01:02)  Glu: 128(06-08 @ 20:43), 210(06-08 @ 01:55), 101(06-07 @ 01:02)    Hgb: 9.2 (06-08 @ 20:43), 10.4 (06-08 @ 01:55), 9.0 (06-07 @ 01:02)  Hct: 30.0 (06-08 @ 20:43), 33.8 (06-08 @ 01:55), 28.3 (06-07 @ 01:02)  WBC: 11.40 (06-08 @ 20:43), 11.05 (06-08 @ 01:55), 12.55 (06-07 @ 01:02)  Plt: 482 (06-08 @ 20:43), 457 (06-08 @ 01:55), 440 (06-07 @ 01:02)    INR:   PTT:       EXAM:  CT BRAIN                            PROCEDURE DATE:  06/02/2020            INTERPRETATION:  INDICATIONS:  left ich    TECHNIQUE:  Serial axial images were obtained from the skull base to the vertex without intravenous contrast.    COMPARISONEXAMINATION: 6/1/2020    FINDINGS:    VENTRICLES AND SULCI:  Unchanged mass effect on the left frontal horn of the lateral ventricle related to the left frontal hematoma as seen on the prior without significant interval change compared with the priorstudy. Small amount of layering hemorrhage in the occipital horns of the lateral ventricle  INTRA-AXIAL: No change in appearance of large left frontal hematoma with mass effect on the left frontal horn. Some associated subarachnoid hemorrhage is seen  EXTRA-AXIAL:  Subtle subarachnoid hemorrhage in the left sylvian region and in the interhemispheric fissure as seen on the prior as well.  VISUALIZED SINUSES:  Clear.  VISUALIZED MASTOIDS:  Clear.  CALVARIUM:  Normal.  MISCELLANEOUS: Patient is intubated with an NG tube    IMPRESSION:  No change from 6/1/2020    Paraneoplastic Autoantibody Evaluation, Spinal Fluid (02.06.20 @ 19:15)    Paraneoplastic Interpretation, CSF: See Note: No informative autoantibodies were detected in the  Paraneoplastic Evaluation. However, a negative result does  not exclude neurological autoimmunity with or without  associated neoplasia. Sensitivity and specificity of  antibody testing are enhancedby testing both serum and  CSF.    Anti-Neuronal Nuclear Ab, Type 1: Negative titer    Anti-Neuronal Nuclear Ab, Type 2: Negative: -------------------ADDITIONAL INFORMATION-------------------  This test was developed and its performance characteristics  determined by [facility] in a manner consistent with CLIA  requirements. This test has not been cleared or approved by  the U.S. Food and Drug Administration. titer    Anti-Neuronal Nuclear Ab, Type 3: Negative: -------------------ADDITIONAL INFORMATION-------------------  This test was developed and its performance characteristics  determined by [facility] in a manner consistent with CLIA  requirements. This test has not been cleared or approved by  the U.S. Food and Drug Administration. titer    Anti-Glial Nuclear Ab, Type 1: Negative: -------------------ADDITIONAL INFORMATION-------------------  This test was developed and its performance characteristics  determined by [facility] in a manner consistent with CLIA  requirements. This test has not been cleared or approved by  the U.S. Food and Drug Administration. titer    Purkinje Cell Cytoplasmic Ab Type 1: Negative: -------------------ADDITIONAL INFORMATION-------------------  This test was developed and its performance characteristics  determined by [facility] in a manner consistent with CLIA  requirements. This test has not been cleared or approved by  the U.S. Food and Drug Administration. titer    Purkinje Cell Cytoplasmic Ab Type 2: Negative: -------------------ADDITIONAL INFORMATION-------------------  This test was developed and its performance characteristics  determined by [facility] in a manner consistent with CLIA  requirements. This test has not been cleared or approved by  the U.S. Food and Drug Administration.  Test Performed by:  [facility] - [address] [address]  : Wagner Fernández M.D. Ph.D.; CLIA# [id number] titer    Purkinje Cell Cytoplasmic Ab Type Tr: Negative: -------------------ADDITIONAL INFORMATION-------------------  This test was developed and its performance characteristics  determined by [facility] in a manner consistent with CLIA  requirements. This test has not been cleared or approved by  the U.S. Food and Drug Administration. titer    Amphiphysin Ab, CSF: Negative: -------------------ADDITIONAL INFORMATION-------------------  This test was developed and its performance characteristics  determined by [facility] in a manner consistent with CLIA  requirements. This test has not been cleared or approved by  the U.S. Food and Drug Administration. titer    CRMP-5-IgG, CSF: Negative: -------------------ADDITIONAL INFORMATION-------------------  This test was developed and its performance characteristics  determined by [facility] in a manner consistent with CLIA  requirements. This test has not been cleared or approved by  the U.S. Food and Drug Administration. titer    Reflex Added: None.: -------------------ADDITIONAL INFORMATION-------------------  This test was developed and its performance characteristics  determined by [facility] in a manner consistent with CLIA  requirements. This test has not been cleared or approved by  the U.S. Food and Drug Administration.        Assessment and Plan:   · Assessment	  ASSESSMENT:  Left frontal intracerebral hemorrhage, brain edema, brain shift,  generalized periodic discharges, UTI     NEURO:  Q4 neurochecks  Cont Keppra and Vimpat for GPDs/risk for seizures  Pain management with Tylenol.   Activity: [] OOB as tolerated [X] Bedrest [X] PT [X] OT [] PMNR    PULM:  Intubated since 5/29  History of COPD   failed CPAP  will likely need tarch   ABg and adjust vent settings accordingly       CV:  Keep -160  TTE negative for PFO  HTN: Continue norvasc and metoprolol    Statin for HLD     RENAL:  Na wnl  IVL   Start low dose cardura re: urine retention    GI:  Diet: On Glucerna Tube feeds  Last BM 6/7  GI prophylaxis [] not indicated [X] PPI [] other:      ENDO:   Better control  Continue synthroid. Change to PO  Continue prednisone 15mg, 5 mg at bedtime (was on it at home for RA)  Goal euglycemia (-180)  12am: NPH 40 units  6am: NPH 48 units  12pm: 44 units  6pm: 28 units    HEME/ONC:  On SQL.  VTE prophylaxis: [X] SCDs [X] chemoprophylaxis [] high risk of DVT/PE on admission due to:  LE doppler 5/30- No DVT  Anemia- improved     ID:  Afebrile   Ertapnem for ESBL UTI   iraheta removed  bladder scan q 6 hr       MISC: Palliative Care consulted. Family meeting 6/9 with daughter BHAVIN Burdick, Ethics and palliative care.   Follow up meeting on 6/9      SOCIAL/FAMILY:  [X] updated family via phone  [] family meeting    CODE STATUS:  [X] Full Code [] DNR [] DNI [] Palliative/Comfort Care    DISPOSITION:  [X] ICU [] Stroke Unit [] Floor [] EMU [] RCU [] PCU    [X] Patient is at high risk of neurologic deterioration/death due to: resp failure, seizures, hemorrhage.     Time spent:  30 critical care minutes

## 2020-06-09 NOTE — PROGRESS NOTE ADULT - ASSESSMENT
ASSESSMENT:  Left frontal intracerebral hemorrhage, generalized periodic discharges, UTI     NEURO:  Q4 neurochecks  Cont Keppra and Vimpat for GPDs/risk for seizures  Pain management with Tylenol.   Given previous psych history and workup not revealing any cause, r/o NMDA encephalitis. Follow up.    Trach and PEG being discussed with family.   Activity: [] OOB as tolerated [X] Bedrest [X] PT [X] OT [] PMNR      PULM:  Intubated since 5/29  History of COPD   CPAP as tolerated       CV:  Keep -160  TTE negative for PFO  HTN: continue norvasc and metoprolol    Statin for HLD       RENAL:  Na wnl  IVL       GI:  Diet: On Glucerna Tube feeds  Last BM 6/7  GI prophylaxis [] not indicated [X] PPI [] other:      ENDO:   Endo Following - adjusting NPH.   Better control  Continue synthroid. Endocrine following.   Continue prednisone 15mg, 5 mg at bedtime (was on it at home for RA)  Goal euglycemia (-180)      HEME/ONC:  On SQL.  VTE prophylaxis: [X] SCDs [X] chemoprophylaxis [] high risk of DVT/PE on admission due to:  LE doppler 5/30- No DVT  Anemia- improved     ID:  Afebrile   ertapnem for esbl       MISC: Palliative Care consulted. Family meeting at 2pm with daughter BHAVIN Burdick and palliative care    SOCIAL/FAMILY:  [X] updated family via phone  [] family meeting    CODE STATUS:  [X] Full Code [] DNR [] DNI [] Palliative/Comfort Care    DISPOSITION:  [X] ICU [] Stroke Unit [] Floor [] EMU [] RCU [] PCU    [X] Patient is at high risk of neurologic deterioration/death due to: resp failure, seizures, hemorrhage.     Time spent: _45  [X] critical care minutes ASSESSMENT:  Left frontal intracerebral hemorrhage, generalized periodic discharges, UTI     NEURO:  Q4 neurochecks  Cont Keppra and Vimpat for GPDs/risk for seizures  Pain management with Tylenol.   Given previous psych history and workup not revealing any cause, r/o NMDA encephalitis. Follow up.    Trach and PEG being discussed with family.   Activity: [] OOB as tolerated [X] Bedrest [X] PT [X] OT [] PMNR    PULM:  Intubated since 5/29  History of COPD   CPAP as tolerated. Will likely need trach      CV:  Keep -160  TTE negative for PFO  HTN: Continue norvasc and metoprolol    Statin for HLD   Tachycardia- possibly related to agitation, CPAP,       RENAL:  Na wnl  IVL   Start low dose cardura re: urine retention    GI:  Diet: On Glucerna Tube feeds  Last BM 6/7  GI prophylaxis [] not indicated [X] PPI [] other:      ENDO:   Endo Following - adjusting NPH.   Better control  Continue synthroid. Change to PO  Continue prednisone 15mg, 5 mg at bedtime (was on it at home for RA)  Goal euglycemia (-180)      HEME/ONC:  On SQL.  VTE prophylaxis: [X] SCDs [X] chemoprophylaxis [] high risk of DVT/PE on admission due to:  LE doppler 5/30- No DVT  Anemia- improved     ID:  Afebrile   Ertapnem for ESBL      MISC: Palliative Care consulted. Family meeting 6/9 with daughter BHAVIN Burdick, Ethics and palliative care.   Follow up 6/9      SOCIAL/FAMILY:  [X] updated family via phone  [] family meeting    CODE STATUS:  [X] Full Code [] DNR [] DNI [] Palliative/Comfort Care    DISPOSITION:  [X] ICU [] Stroke Unit [] Floor [] EMU [] RCU [] PCU    [X] Patient is at high risk of neurologic deterioration/death due to: resp failure, seizures, hemorrhage.     Time spent:  45 critical care minutes ASSESSMENT:  Left frontal intracerebral hemorrhage, generalized periodic discharges, UTI     NEURO:  Q4 neurochecks  Cont Keppra and Vimpat for GPDs/risk for seizures  Pain management with Tylenol.   Given previous psych history and workup not revealing any cause, r/o NMDA encephalitis. NMDA AB neg.    Trach and PEG being discussed with family.   Activity: [] OOB as tolerated [X] Bedrest [X] PT [X] OT [] PMNR    PULM:  Intubated since 5/29  History of COPD   CPAP as tolerated. Will likely need trach      CV:  Keep -160  TTE negative for PFO  HTN: Continue norvasc and metoprolol    Statin for HLD   Tachycardia- possibly related to agitation, CPAP,       RENAL:  Na wnl  IVL   Start low dose cardura re: urine retention    GI:  Diet: On Glucerna Tube feeds  Last BM 6/7  GI prophylaxis [] not indicated [X] PPI [] other:      ENDO:   Endo Following - adjusting NPH.   Better control  Continue synthroid. Change to PO  Continue prednisone 15mg, 5 mg at bedtime (was on it at home for RA)  Goal euglycemia (-180)      HEME/ONC:  On SQL.  VTE prophylaxis: [X] SCDs [X] chemoprophylaxis [] high risk of DVT/PE on admission due to:  LE doppler 5/30- No DVT  Anemia- improved     ID:  Afebrile   Ertapnem for ESBL      MISC: Palliative Care consulted. Family meeting 6/9 with daughter BHAVIN Budrick, Ethics and palliative care.   Follow up meeting on 6/9      SOCIAL/FAMILY:  [X] updated family via phone  [] family meeting    CODE STATUS:  [X] Full Code [] DNR [] DNI [] Palliative/Comfort Care    DISPOSITION:  [X] ICU [] Stroke Unit [] Floor [] EMU [] RCU [] PCU    [X] Patient is at high risk of neurologic deterioration/death due to: resp failure, seizures, hemorrhage.     Time spent:  45 critical care minutes ASSESSMENT:  Left frontal intracerebral hemorrhage, generalized periodic discharges, UTI     NEURO:  Q4 neurochecks  Cont Keppra and Vimpat for GPDs/risk for seizures  Pain management with Tylenol.   Given previous psych history and workup not revealing any cause, NMDA encephalitis testing sent -> NMDA AB neg.    Trach and PEG being discussed with family.   Activity: [] OOB as tolerated [X] Bedrest [X] PT [X] OT [] PMNR    PULM:  Intubated since 5/29  History of COPD   CPAP as tolerated. Will likely need trach      CV:  Keep -160  TTE negative for PFO  HTN: Continue norvasc and metoprolol    Statin for HLD   Tachycardia- possibly related to agitation, CPAP       RENAL:  Na wnl  IVL   Start low dose cardura re: urine retention    GI:  Diet: On Glucerna Tube feeds  Last BM 6/7  GI prophylaxis [] not indicated [X] PPI [] other:      ENDO:   Endo Following - adjusting NPH.   Better control  Continue synthroid. Change to PO  Continue prednisone 15mg, 5 mg at bedtime (was on it at home for RA)  Goal euglycemia (-180)      HEME/ONC:  On SQL.  VTE prophylaxis: [X] SCDs [X] chemoprophylaxis [] high risk of DVT/PE on admission due to:  LE doppler 5/30- No DVT  Anemia- improved     ID:  Afebrile   Ertapnem for ESBL      MISC: Palliative Care consulted. Family meeting 6/9 with daughter BHAVIN Burdick, Ethics and palliative care.   Follow up meeting on 6/9      SOCIAL/FAMILY:  [X] updated family via phone  [] family meeting    CODE STATUS:  [X] Full Code [] DNR [] DNI [] Palliative/Comfort Care    DISPOSITION:  [X] ICU [] Stroke Unit [] Floor [] EMU [] RCU [] PCU    [X] Patient is at high risk of neurologic deterioration/death due to: resp failure, seizures, hemorrhage.     Time spent:  45 critical care minutes

## 2020-06-09 NOTE — PROGRESS NOTE ADULT - PROBLEM SELECTOR PLAN 3
-continue statin therapy    discussed plan w/RN  call w/any questions  pager: [phone]   cell: [phone]  office: [phone]    -I spent a total time of 25 mins with the patient at bedside/on unit of which more than 50% of time was spent on counseling/coordination of care.

## 2020-06-09 NOTE — PROGRESS NOTE ADULT - SUBJECTIVE AND OBJECTIVE BOX
SUMMARY:    64 year-old woman with history of hypothyroidism, HTN, IDDM, COPD/CHRIS on home O2, RA (on Prednisone), SBO s/p ex lap with lysis of adhesions c/b evisceration,  recent hospitalization in 3/2020 with encephalopathy, significantly hypothyroid (, without myxedema coma), small (stable) right sided SDH, UTI, catatonic reaction to VPA (now resolved) and DKA and psychiatric issues, 5/2020 hospitalization for GIB, Emphysematous cystitis, endometritis, Sigmoid colitis, morbid obesity and functional quadriplegia admitted 5/29/20 for left frontal intracerebral hemorrhage with intraventricular extension. Her ICU course has been notable for GPDs on EEG treated with levetiracetam and lacosamide, uncontrolled diabetes mellitus II treated with insulin drip and then transitioned to NPH and ESBL UTI.     HCP: Norris Rush, Son [phone]; Daughter, Heavenly Schmidt [phone]; son asks that only him or his sister, Heavenly be contacted for patient updates/information as they are both proxys for patient     OVERNIGHT EVENTS: none   This am, found to have LUE spasmodic movements. Aborted after holding LUE.     ICU Vital Signs Last 24 Hrs  T(C): 36.4 (08 Jun 2020 05:00), Max: 37.3 (07 Jun 2020 11:00)  T(F): 97.5 (08 Jun 2020 05:00), Max: 99.1 (07 Jun 2020 11:00)  HR: 114 (08 Jun 2020 05:00) (97 - 118  ABP: 134/69 (08 Jun 2020 05:00) (121/67 - 149/75)  ABP(mean): 96 (08 Jun 2020 05:00) (88 - 107)  RR: 21 (08 Jun 2020 05:00) (16 - 31)  SpO2: 100% (08 Jun 2020 05:00) (97% - 100%)      06-07-20 @ 07:01  -  06-08-20 @ 07:00  --------------------------------------------------------  IN: 2480 mL / OUT: 1501 mL / NET: 979 mL      Mode: AC/ CMV (Assist Control/ Continuous Mandatory Ventilation), RR (machine): 14, TV (machine): 450, FiO2: 30, PEEP: 5, ITime: 0.8, MAP: 13, PIP: 21  acetaminophen    Suspension .. 650 milliGRAM(s) Oral every 6 hours PRN  amLODIPine   Tablet 10 milliGRAM(s) Oral daily  chlorhexidine 0.12% Liquid 15 milliLiter(s) Oral Mucosa every 12 hours  chlorhexidine 4% Liquid 1 Application(s) Topical <User Schedule>  dextrose 40% Gel 15 Gram(s) Oral once PRN  dextrose 5%. 1000 milliLiter(s) (50 mL/Hr) IV Continuous <Continuous>  dextrose 50% Injectable 12.5 Gram(s) IV Push once  dextrose 50% Injectable 25 Gram(s) IV Push once  dextrose 50% Injectable 25 Gram(s) IV Push once  enoxaparin Injectable 30 milliGRAM(s) SubCutaneous two times a day  fentaNYL    Injectable 25 MICROGram(s) IV Push every 2 hours PRN  glucagon  Injectable 1 milliGRAM(s) IntraMuscular once PRN  insulin lispro (HumaLOG) corrective regimen sliding scale   SubCutaneous every 6 hours  insulin NPH human recombinant 36 Unit(s) SubCutaneous <User Schedule>  insulin NPH human recombinant 40 Unit(s) SubCutaneous <User Schedule>  insulin NPH human recombinant 44 Unit(s) SubCutaneous <User Schedule>  lacosamide Solution 200 milliGRAM(s) Oral two times a day  levETIRAcetam  Solution 1000 milliGRAM(s) Oral two times a day  levothyroxine Injectable 120 MICROGram(s) IV Push at bedtime  meropenem  IVPB 1000 milliGRAM(s) IV Intermittent every 8 hours  meropenem  IVPB      metoprolol tartrate 25 milliGRAM(s) Oral two times a day  pantoprazole  Injectable 40 milliGRAM(s) IV Push daily  predniSONE   Tablet 5 milliGRAM(s) Oral at bedtime  predniSONE   Tablet 15 milliGRAM(s) Oral <User Schedule>  simvastatin 20 milliGRAM(s) Oral at bedtime  sodium chloride 0.9% lock flush 10 milliLiter(s) IV Push every 1 hour PRN                          10.4   11.05 )-----------( 457      ( 08 Jun 2020 01:55 )             33.8     06-08    142  |  104  |  19  ----------------------------<  210<H>  4.2   |  24  |  0.52    Ca    9.7      08 Jun 2020 01:55  Phos  2.7     06-08  Mg     2.1     06-08        ABG - ( 08 Jun 2020 01:52 )  pH, Arterial: 7.45  pH, Blood: x     /  pCO2: 36    /  pO2: 121   / HCO3: 25    / Base Excess: 1.5   /  SaO2: 99          POCT Blood Glucose.: 210 mg/dL (08 Jun 2020 05:07)  POCT Blood Glucose.: 199 mg/dL (07 Jun 2020 23:37)  POCT Blood Glucose.: 231 mg/dL (07 Jun 2020 17:01)  POCT Blood Glucose.: 231 mg/dL (07 Jun 2020 11:35)      CULTURES:  Culture Results:   >100,000 CFU/ml Escherichia coli ESBL (06-04 @ 01:09)  Culture Results:   No growth to date. (06-03 @ 15:00)      PHYSICAL EXAMINATION:   General: No Acute Distress   Neurological: Intubated, pupils 3mm and reactive, +corneal reflexes, +cough/gag, +overbreathing vent set rate, not command following, left gaze preference, RUE 0/5, LUE WD- intermittent spasmodic movements, BLEs TF  Pulmonary: Clear to Auscultation, No Rales, No Rhonchi, No Wheezes   Cardiovascular: S1, S2, Regular Rate and Rhythm   Gastrointestinal: Soft, Nontender, Nondistended SUMMARY:    64 year-old woman with history of hypothyroidism, HTN, IDDM, COPD/CHRIS on home O2, RA (on Prednisone), SBO s/p ex lap with lysis of adhesions c/b evisceration,  recent hospitalization in 3/2020 with encephalopathy, significantly hypothyroid (, without myxedema coma), small (stable) right sided SDH, UTI, catatonic reaction to VPA (now resolved) and DKA and psychiatric issues, 5/2020 hospitalization for GIB, Emphysematous cystitis, endometritis, Sigmoid colitis, morbid obesity and functional quadriplegia admitted 5/29/20 for left frontal intracerebral hemorrhage with intraventricular extension. Her ICU course has been notable for GPDs on EEG treated with levetiracetam and lacosamide, uncontrolled diabetes mellitus II treated with insulin drip and then transitioned to NPH and ESBL UTI.   HCP: Norris Rush, Son [phone]; Daughter, Heavenly Schmidt [phone]; son asks that only him or his sister, Heavenly be contacted for patient updates/information as they are both proxys for patient.    HOSP COURSE:   5/29: DDAVP  6/9: Found to have LUE spasmodic movements. Aborted after holding LUE.   6/9: [facility] meeting with palliative care, ethics.       OVERNIGHT EVENTS: Afebrile    ICU Vital Signs Last 24 Hrs  T(C): 36.9 (09 Jun 2020 07:00), Max: 36.9 (08 Jun 2020 10:00)  T(F): 98.4 (09 Jun 2020 07:00), Max: 98.4 (08 Jun 2020 10:00)  HR: 108 (09 Jun 2020 08:23) (101 - 119)  ABP: 127/73 (09 Jun 2020 07:00) (121/74 - 171/92)  ABP(mean): 95 (09 Jun 2020 07:00) (93 - 123)  RR: 23 (09 Jun 2020 07:00) (16 - 35)  SpO2: 100% (09 Jun 2020 08:23) (99% - 100%)      06-08-20 @ 07:01  -  06-09-20 @ 07:00  --------------------------------------------------------  IN: 1910 mL / OUT: 1304 mL / NET: 606 mL    06-09-20 @ 07:01  -  06-09-20 @ 08:39  --------------------------------------------------------  IN: 65 mL / OUT: 0 mL / NET: 65 mL      Mode: CPAP with PS, FiO2: 30, PEEP: 5, PS: 15, MAP: 12, PIP: 20  acetaminophen    Suspension .. 650 milliGRAM(s) Oral every 6 hours PRN  amLODIPine   Tablet 10 milliGRAM(s) Oral daily  chlorhexidine 0.12% Liquid 15 milliLiter(s) Oral Mucosa every 12 hours  chlorhexidine 4% Liquid 1 Application(s) Topical <User Schedule>  dextrose 40% Gel 15 Gram(s) Oral once PRN  dextrose 5%. 1000 milliLiter(s) (50 mL/Hr) IV Continuous <Continuous>  dextrose 50% Injectable 12.5 Gram(s) IV Push once  dextrose 50% Injectable 25 Gram(s) IV Push once  dextrose 50% Injectable 25 Gram(s) IV Push once  enoxaparin Injectable 30 milliGRAM(s) SubCutaneous two times a day  ertapenem  IVPB 1000 milliGRAM(s) IV Intermittent <User Schedule>  fentaNYL    Injectable 25 MICROGram(s) IV Push every 2 hours PRN  glucagon  Injectable 1 milliGRAM(s) IntraMuscular once PRN  insulin lispro (HumaLOG) corrective regimen sliding scale   SubCutaneous every 6 hours  insulin NPH human recombinant 30 Unit(s) SubCutaneous <User Schedule>  insulin NPH human recombinant 48 Unit(s) SubCutaneous <User Schedule>  insulin NPH human recombinant 40 Unit(s) SubCutaneous <User Schedule>  levETIRAcetam  Solution 1000 milliGRAM(s) Oral two times a day  levothyroxine Injectable 120 MICROGram(s) IV Push at bedtime  metoprolol tartrate 25 milliGRAM(s) Oral two times a day  simvastatin 20 milliGRAM(s) Oral at bedtime  sodium chloride 0.9% lock flush 10 milliLiter(s) IV Push every 1 hour PRN                          9.2    11.40 )-----------( 482      ( 08 Jun 2020 20:43 )             30.0     06-08    143  |  109<H>  |  20  ----------------------------<  128<H>  3.9   |  22  |  0.51    Ca    9.3      08 Jun 2020 20:43  Phos  2.6     06-08  Mg     2.0     06-08        ABG - ( 08 Jun 2020 01:52 )  pH, Arterial: 7.45  pH, Blood: x     /  pCO2: 36    /  pO2: 121   / HCO3: 25    / Base Excess: 1.5   /  SaO2: 99        POCT Blood Glucose.: 210 mg/dL (08 Jun 2020 05:07)  POCT Blood Glucose.: 199 mg/dL (07 Jun 2020 23:37)  POCT Blood Glucose.: 231 mg/dL (07 Jun 2020 17:01)  POCT Blood Glucose.: 231 mg/dL (07 Jun 2020 11:35)      CULTURES:  Culture Results:   >100,000 CFU/ml Escherichia coli ESBL (06-04 @ 01:09)  Culture Results:   No growth to date. (06-03 @ 15:00)      PHYSICAL EXAMINATION:   General: No Acute Distress   Neurological: Intubated, pupils 3mm and reactive, +corneal reflexes, +cough/gag, +overbreathing vent set rate, not following commands, left gaze preference, RUE trace flexion movement, LUE WD- flexion movement, BLEs TF L>R  Pulmonary: Clear to Auscultation, No Rales, No Rhonchi, No Wheezes   Cardiovascular: S1, S2, Regular Rate and Rhythm   Gastrointestinal: Soft, Nontender, Nondistended SUMMARY:    64 year-old woman with history of hypothyroidism, HTN, IDDM, COPD/CHRIS on home O2, RA (on Prednisone), SBO s/p ex lap with lysis of adhesions c/b evisceration,  recent hospitalization in 3/2020 with encephalopathy, significantly hypothyroid (, without myxedema coma), small (stable) right sided SDH, UTI, catatonic reaction to VPA (now resolved) and DKA and psychiatric issues, 5/2020 hospitalization for GIB, Emphysematous cystitis, endometritis, Sigmoid colitis, morbid obesity and functional quadriplegia admitted 5/29/20 for left frontal intracerebral hemorrhage with intraventricular extension. Her ICU course has been notable for GPDs on EEG treated with levetiracetam and lacosamide, uncontrolled diabetes mellitus II treated with insulin drip and then transitioned to NPH and ESBL UTI.   HCP: Norris Rush, Son [phone]; Daughter, Heavenly Schmidt [phone]; son asks that only him or his sister, Heavenly be contacted for patient updates/information as they are both proxys for patient.    HOSP COURSE:   5/29: DDAVP  6/9: Found to have LUE spasmodic movements. Aborted after holding LUE.   6/9: [facility] meeting with palliative care, ethics.     OVERNIGHT EVENTS: Afebrile    ICU Vital Signs Last 24 Hrs  T(C): 36.9 (09 Jun 2020 07:00), Max: 36.9 (08 Jun 2020 10:00)  T(F): 98.4 (09 Jun 2020 07:00), Max: 98.4 (08 Jun 2020 10:00)  HR: 108 (09 Jun 2020 08:23) (101 - 119)  ABP: 127/73 (09 Jun 2020 07:00) (121/74 - 171/92)  ABP(mean): 95 (09 Jun 2020 07:00) (93 - 123)  RR: 23 (09 Jun 2020 07:00) (16 - 35)  SpO2: 100% (09 Jun 2020 08:23) (99% - 100%)      06-08-20 @ 07:01  -  06-09-20 @ 07:00  --------------------------------------------------------  IN: 1910 mL / OUT: 1304 mL / NET: 606 mL    06-09-20 @ 07:01  -  06-09-20 @ 08:39  --------------------------------------------------------  IN: 65 mL / OUT: 0 mL / NET: 65 mL      Mode: CPAP with PS, FiO2: 30, PEEP: 5, PS: 15, MAP: 12, PIP: 20  acetaminophen    Suspension .. 650 milliGRAM(s) Oral every 6 hours PRN  amLODIPine   Tablet 10 milliGRAM(s) Oral daily  chlorhexidine 0.12% Liquid 15 milliLiter(s) Oral Mucosa every 12 hours  chlorhexidine 4% Liquid 1 Application(s) Topical <User Schedule>  dextrose 40% Gel 15 Gram(s) Oral once PRN  dextrose 5%. 1000 milliLiter(s) (50 mL/Hr) IV Continuous <Continuous>  dextrose 50% Injectable 12.5 Gram(s) IV Push once  dextrose 50% Injectable 25 Gram(s) IV Push once  dextrose 50% Injectable 25 Gram(s) IV Push once  enoxaparin Injectable 30 milliGRAM(s) SubCutaneous two times a day  ertapenem  IVPB 1000 milliGRAM(s) IV Intermittent <User Schedule>  fentaNYL    Injectable 25 MICROGram(s) IV Push every 2 hours PRN  glucagon  Injectable 1 milliGRAM(s) IntraMuscular once PRN  insulin lispro (HumaLOG) corrective regimen sliding scale   SubCutaneous every 6 hours  insulin NPH human recombinant 30 Unit(s) SubCutaneous <User Schedule>  insulin NPH human recombinant 48 Unit(s) SubCutaneous <User Schedule>  insulin NPH human recombinant 40 Unit(s) SubCutaneous <User Schedule>  levETIRAcetam  Solution 1000 milliGRAM(s) Oral two times a day  levothyroxine Injectable 120 MICROGram(s) IV Push at bedtime  metoprolol tartrate 25 milliGRAM(s) Oral two times a day  simvastatin 20 milliGRAM(s) Oral at bedtime  sodium chloride 0.9% lock flush 10 milliLiter(s) IV Push every 1 hour PRN                          9.2    11.40 )-----------( 482      ( 08 Jun 2020 20:43 )             30.0     06-08    143  |  109<H>  |  20  ----------------------------<  128<H>  3.9   |  22  |  0.51    Ca    9.3      08 Jun 2020 20:43  Phos  2.6     06-08  Mg     2.0     06-08        ABG - ( 08 Jun 2020 01:52 )  pH, Arterial: 7.45  pH, Blood: x     /  pCO2: 36    /  pO2: 121   / HCO3: 25    / Base Excess: 1.5   /  SaO2: 99        POCT Blood Glucose.: 210 mg/dL (08 Jun 2020 05:07)  POCT Blood Glucose.: 199 mg/dL (07 Jun 2020 23:37)  POCT Blood Glucose.: 231 mg/dL (07 Jun 2020 17:01)  POCT Blood Glucose.: 231 mg/dL (07 Jun 2020 11:35)      CULTURES:  Culture Results:   >100,000 CFU/ml Escherichia coli ESBL (06-04 @ 01:09)  Culture Results:   No growth to date. (06-03 @ 15:00)      PHYSICAL EXAMINATION:   General: No Acute Distress   Neurological: Intubated, pupils 3mm and reactive, +corneal reflexes, +cough/gag, +overbreathing vent set rate, not following commands, left gaze preference, RUE trace flexion movement, LUE WD- flexion movement, BLEs TF L>R  Pulmonary: Clear to Auscultation, No Rales, No Rhonchi, No Wheezes   Cardiovascular: S1, S2, Regular Rate and Rhythm   Gastrointestinal: Soft, Nontender, Nondistended

## 2020-06-09 NOTE — PROGRESS NOTE ADULT - PROBLEM SELECTOR PLAN 1
-test BG Q6h  -Adjust NPH as follows  12am: NPH 40 units  6am: NPH 48 units  12pm: 44 units  6pm: 28 units  -Hold NPH if TF off. Can reduce dose if BG less than 100mg/dl.   -Please notify endocrine if any changes in steroid regimen or TF regimen so we can adjust insulin regimen accordingly

## 2020-06-09 NOTE — CHART NOTE - NSCHARTNOTEFT_GEN_A_CORE
Follow up conference call scheduled for tomorrow at 10 am with Heavenly and Norris, patients children.  Attendees:   Sneha from Ethics,  Dr Bills, [facility],  this writer

## 2020-06-10 LAB
ANION GAP SERPL CALC-SCNC: 13 MMOL/L — SIGNIFICANT CHANGE UP (ref 5–17)
BASE EXCESS BLDA CALC-SCNC: 1.7 MMOL/L — SIGNIFICANT CHANGE UP (ref -2–2)
BUN SERPL-MCNC: 25 MG/DL — HIGH (ref 7–23)
CALCIUM SERPL-MCNC: 9.9 MG/DL — SIGNIFICANT CHANGE UP (ref 8.4–10.5)
CHLORIDE SERPL-SCNC: 105 MMOL/L — SIGNIFICANT CHANGE UP (ref 96–108)
CO2 BLDA-SCNC: 27 MMOL/L — SIGNIFICANT CHANGE UP (ref 22–30)
CO2 SERPL-SCNC: 26 MMOL/L — SIGNIFICANT CHANGE UP (ref 22–31)
CREAT SERPL-MCNC: 0.63 MG/DL — SIGNIFICANT CHANGE UP (ref 0.5–1.3)
GAS PNL BLDA: SIGNIFICANT CHANGE UP
GLUCOSE BLDC GLUCOMTR-MCNC: 121 MG/DL — HIGH (ref 70–99)
GLUCOSE BLDC GLUCOMTR-MCNC: 171 MG/DL — HIGH (ref 70–99)
GLUCOSE BLDC GLUCOMTR-MCNC: 226 MG/DL — HIGH (ref 70–99)
GLUCOSE SERPL-MCNC: 137 MG/DL — HIGH (ref 70–99)
HCO3 BLDA-SCNC: 26 MMOL/L — SIGNIFICANT CHANGE UP (ref 21–29)
HCT VFR BLD CALC: 29.9 % — LOW (ref 34.5–45)
HGB BLD-MCNC: 9.3 G/DL — LOW (ref 11.5–15.5)
MAGNESIUM SERPL-MCNC: 2.1 MG/DL — SIGNIFICANT CHANGE UP (ref 1.6–2.6)
MCHC RBC-ENTMCNC: 29.4 PG — SIGNIFICANT CHANGE UP (ref 27–34)
MCHC RBC-ENTMCNC: 31.1 GM/DL — LOW (ref 32–36)
MCV RBC AUTO: 94.6 FL — SIGNIFICANT CHANGE UP (ref 80–100)
NRBC # BLD: 0 /100 WBCS — SIGNIFICANT CHANGE UP (ref 0–0)
PCO2 BLDA: 40 MMHG — SIGNIFICANT CHANGE UP (ref 32–46)
PH BLDA: 7.42 — SIGNIFICANT CHANGE UP (ref 7.35–7.45)
PHOSPHATE SERPL-MCNC: 3.9 MG/DL — SIGNIFICANT CHANGE UP (ref 2.5–4.5)
PLATELET # BLD AUTO: 461 K/UL — HIGH (ref 150–400)
PO2 BLDA: 192 MMHG — HIGH (ref 74–108)
POTASSIUM SERPL-MCNC: 4.4 MMOL/L — SIGNIFICANT CHANGE UP (ref 3.5–5.3)
POTASSIUM SERPL-SCNC: 4.4 MMOL/L — SIGNIFICANT CHANGE UP (ref 3.5–5.3)
RBC # BLD: 3.16 M/UL — LOW (ref 3.8–5.2)
RBC # FLD: 20.3 % — HIGH (ref 10.3–14.5)
SAO2 % BLDA: 100 % — HIGH (ref 92–96)
SODIUM SERPL-SCNC: 144 MMOL/L — SIGNIFICANT CHANGE UP (ref 135–145)
WBC # BLD: 12.14 K/UL — HIGH (ref 3.8–10.5)
WBC # FLD AUTO: 12.14 K/UL — HIGH (ref 3.8–10.5)

## 2020-06-10 PROCEDURE — 99233 SBSQ HOSP IP/OBS HIGH 50: CPT

## 2020-06-10 PROCEDURE — 99291 CRITICAL CARE FIRST HOUR: CPT

## 2020-06-10 PROCEDURE — 99232 SBSQ HOSP IP/OBS MODERATE 35: CPT

## 2020-06-10 PROCEDURE — 99292 CRITICAL CARE ADDL 30 MIN: CPT

## 2020-06-10 PROCEDURE — 71045 X-RAY EXAM CHEST 1 VIEW: CPT | Mod: 26

## 2020-06-10 RX ORDER — LACOSAMIDE 50 MG/1
200 TABLET ORAL
Refills: 0 | Status: DISCONTINUED | OUTPATIENT
Start: 2020-06-10 | End: 2020-06-11

## 2020-06-10 RX ADMIN — HUMAN INSULIN 44 UNIT(S): 100 INJECTION, SUSPENSION SUBCUTANEOUS at 11:59

## 2020-06-10 RX ADMIN — LEVETIRACETAM 1000 MILLIGRAM(S): 250 TABLET, FILM COATED ORAL at 17:39

## 2020-06-10 RX ADMIN — AMLODIPINE BESYLATE 10 MILLIGRAM(S): 2.5 TABLET ORAL at 06:40

## 2020-06-10 RX ADMIN — HUMAN INSULIN 48 UNIT(S): 100 INJECTION, SUSPENSION SUBCUTANEOUS at 06:39

## 2020-06-10 RX ADMIN — CHLORHEXIDINE GLUCONATE 15 MILLILITER(S): 213 SOLUTION TOPICAL at 06:43

## 2020-06-10 RX ADMIN — LACOSAMIDE 200 MILLIGRAM(S): 50 TABLET ORAL at 21:30

## 2020-06-10 RX ADMIN — ERTAPENEM SODIUM 120 MILLIGRAM(S): 1 INJECTION, POWDER, LYOPHILIZED, FOR SOLUTION INTRAMUSCULAR; INTRAVENOUS at 13:08

## 2020-06-10 RX ADMIN — HUMAN INSULIN 28 UNIT(S): 100 INJECTION, SUSPENSION SUBCUTANEOUS at 17:39

## 2020-06-10 RX ADMIN — Medication 5 MILLIGRAM(S): at 21:31

## 2020-06-10 RX ADMIN — Medication 2: at 06:42

## 2020-06-10 RX ADMIN — SIMVASTATIN 20 MILLIGRAM(S): 20 TABLET, FILM COATED ORAL at 21:42

## 2020-06-10 RX ADMIN — Medication 4: at 11:59

## 2020-06-10 RX ADMIN — Medication 15 MILLIGRAM(S): at 06:42

## 2020-06-10 RX ADMIN — LEVETIRACETAM 1000 MILLIGRAM(S): 250 TABLET, FILM COATED ORAL at 06:40

## 2020-06-10 RX ADMIN — CHLORHEXIDINE GLUCONATE 1 APPLICATION(S): 213 SOLUTION TOPICAL at 21:34

## 2020-06-10 RX ADMIN — Medication 150 MICROGRAM(S): at 06:40

## 2020-06-10 RX ADMIN — Medication 2 MILLIGRAM(S): at 21:32

## 2020-06-10 RX ADMIN — CHLORHEXIDINE GLUCONATE 15 MILLILITER(S): 213 SOLUTION TOPICAL at 17:39

## 2020-06-10 RX ADMIN — LACOSAMIDE 200 MILLIGRAM(S): 50 TABLET ORAL at 17:39

## 2020-06-10 RX ADMIN — PANTOPRAZOLE SODIUM 40 MILLIGRAM(S): 20 TABLET, DELAYED RELEASE ORAL at 11:59

## 2020-06-10 RX ADMIN — Medication 25 MILLIGRAM(S): at 17:39

## 2020-06-10 RX ADMIN — ENOXAPARIN SODIUM 30 MILLIGRAM(S): 100 INJECTION SUBCUTANEOUS at 06:42

## 2020-06-10 RX ADMIN — LACOSAMIDE 200 MILLIGRAM(S): 50 TABLET ORAL at 06:40

## 2020-06-10 RX ADMIN — ENOXAPARIN SODIUM 30 MILLIGRAM(S): 100 INJECTION SUBCUTANEOUS at 17:39

## 2020-06-10 RX ADMIN — Medication 25 MILLIGRAM(S): at 06:39

## 2020-06-10 NOTE — PROGRESS NOTE ADULT - SUBJECTIVE AND OBJECTIVE BOX
Diabetes Follow up note:    Chief complaint: T2DM/steroid induced hyperglycemia    Interval Hx: BG [id number] over past 24 hours. Tolerating feeds @ goal. Remains intubated w/minimal response.     Review of Systems:  unable to provide ROS. AMS    MEDS:  insulin lispro (HumaLOG) corrective regimen sliding scale   SubCutaneous every 6 hours  insulin NPH human recombinant 48 Unit(s) SubCutaneous <User Schedule>  insulin NPH human recombinant 44 Unit(s) SubCutaneous <User Schedule>  insulin NPH human recombinant 28 Unit(s) SubCutaneous <User Schedule>  insulin NPH human recombinant 40 Unit(s) SubCutaneous <User Schedule>  levothyroxine 150 MICROGram(s) Oral daily  predniSONE   Tablet 15 milliGRAM(s) Oral <User Schedule>  predniSONE   Tablet 5 milliGRAM(s) Oral at bedtime  simvastatin 20 milliGRAM(s) Oral at bedtime    ertapenem  IVPB 1000 milliGRAM(s) IV Intermittent <User Schedule>    Allergies    Depakote (Other)  Seroquel (Other (Severe))        PE:  General: Female lying in bed. NAD.   Vital Signs Last 24 Hrs  T(C): 36.9 (10 Nile 2020 11:00), Max: 37.2 (09 Jun 2020 20:00)  T(F): 98.4 (10 Nile 2020 11:00), Max: 98.9 (09 Jun 2020 20:00)  HR: 119 (10 Nile 2020 14:00) (99 - 119)  BP: --  BP(mean): --  RR: 14 (10 Niel 2020 11:00) (14 - 38)  SpO2: 100% (10 Nile 2020 14:00) (100% - 100%)  HEENT: ETT in place. On vent.   Abd: Soft, NT,ND, Obese.   Extremities: Warm  Neuro: Lethargic. unresponsive.     LABS:  POCT Blood Glucose.: 226 mg/dL (06-10-20 @ 11:57)  POCT Blood Glucose.: 171 mg/dL (06-10-20 @ 06:35)  POCT Blood Glucose.: 120 mg/dL (06-09-20 @ 23:45)  POCT Blood Glucose.: 160 mg/dL (06-09-20 @ 17:55)  POCT Blood Glucose.: 175 mg/dL (06-09-20 @ 11:28)  POCT Blood Glucose.: 151 mg/dL (06-09-20 @ 05:13)  POCT Blood Glucose.: 111 mg/dL (06-08-20 @ 23:47)  POCT Blood Glucose.: 167 mg/dL (06-08-20 @ 18:23)  POCT Blood Glucose.: 295 mg/dL (06-08-20 @ 12:48)  POCT Blood Glucose.: 210 mg/dL (06-08-20 @ 05:07)  POCT Blood Glucose.: 199 mg/dL (06-07-20 @ 23:37)  POCT Blood Glucose.: 231 mg/dL (06-07-20 @ 17:01)                            9.3    12.14 )-----------( 461      ( 10 Nile 2020 01:40 )             29.9       06-10    144  |  105  |  25<H>  ----------------------------<  137<H>  4.4   |  26  |  0.63    Ca    9.9      10 Nile 2020 01:40  Phos  3.9     06-10  Mg     2.1     06-10        Thyroid Function Tests:  06-04 @ 01:58 TSH -- FreeT4 2.7 T3 -- Anti TPO -- Anti Thyroglobulin Ab -- TSI --  05-30 @ 04:44 TSH 12.30 FreeT4 -- T3 51 Anti TPO -- Anti Thyroglobulin Ab -- TSI --      A1C with Estimated Average Glucose Result: 7.4 % (05-30-20 @ 04:43)  A1C with Estimated Average Glucose Result: 6.4 % (05-14-20 @ 16:23)  A1C with Estimated Average Glucose Result: 6.5 % (05-13-20 @ 08:24)  Hemoglobin A1C, Whole Blood: 9.0 % (01-28-20 @ 19:30)  Hemoglobin A1C, Whole Blood: 9.2 % (01-27-20 @ 17:36)          Contact number: bhavna [phone] or [phone]

## 2020-06-10 NOTE — PROGRESS NOTE ADULT - ASSESSMENT
63 yo female with T2D uncontrolled while inpatient (HbA1c 7.4%) with no known complications and hypothyroidism here with mental status changes associated with shaking found to have a left frontal parenchymal hematoma with mass effect on the left lateral ventricle and intraventricular extension with blood layering in the occipital horns. Endocrine following for hyperglycemia on chronic steroids. Tolerating TF @ goal. BG values mostly at goal on present insulin regimen. BG goal (100-180mg/dl).

## 2020-06-10 NOTE — PROGRESS NOTE ADULT - PROBLEM SELECTOR PLAN 2
-Levothyroxine 150mcg daily  -Hold TF 1 hour prior and after levothyroxine given.   -Can recheck Free T4 on 6/11.     discussed plan w/RN  pager: [phone]   cell: [phone]  office: [phone]    -I spent a total time of 25 mins with the patient at bedside/on unit of which more than 50% of time was spent on counseling/coordination of care.

## 2020-06-10 NOTE — PROGRESS NOTE ADULT - SUBJECTIVE AND OBJECTIVE BOX
SUMMARY:    64 year-old woman with history of hypothyroidism, HTN, IDDM, COPD/CHRIS on home O2, RA (on Prednisone), SBO s/p ex lap with lysis of adhesions c/b evisceration,  recent hospitalization in 3/2020 with encephalopathy, significantly hypothyroid (, without myxedema coma), small (stable) right sided SDH, UTI, catatonic reaction to VPA (now resolved) and DKA and psychiatric issues, 5/2020 hospitalization for GIB, Emphysematous cystitis, endometritis, Sigmoid colitis, morbid obesity and functional quadriplegia admitted 5/29/20 for left frontal intracerebral hemorrhage with intraventricular extension. Her ICU course has been notable for GPDs on EEG treated with levetiracetam and lacosamide, uncontrolled diabetes mellitus II treated with insulin drip and then transitioned to NPH and ESBL UTI.   HCP: Norris Rush, Son [phone]; Daughter, Heavenly Schmidt [phone]; son asks that only him or his sister, Heavenly be contacted for patient updates/information as they are both proxys for patient.    HOSP COURSE:   5/29: DDAVP  6/9: Found to have LUE spasmodic movements. Aborted after holding LUE.   6/9: [facility] meeting with palliative care, ethics.     OVERNIGHT EVENTS: Afebrile      ICU Vital Signs Last 24 Hrs  T(C): 36.2 (10 Nile 2020 04:00), Max: 37.2 (09 Jun 2020 20:00)  T(F): 97.2 (10 Nile 2020 04:00), Max: 98.9 (09 Jun 2020 20:00)  HR: 99 (10 Nile 2020 08:00) (99 - 119)  ABP: 109/66 (10 Nile 2020 08:00) (96/61 - 167/92)  ABP(mean): 83 (10 Nile 2020 08:00) (74 - 123)  RR: 17 (10 Nile 2020 08:00) (15 - 38)  SpO2: 100% (10 Nile 2020 08:00) (100% - 100%)      06-09-20 @ 07:01  -  06-10-20 @ 07:00  --------------------------------------------------------  IN: 1520 mL / OUT: 1483 mL / NET: 37 mL    06-10-20 @ 07:01  -  06-10-20 @ 08:22  --------------------------------------------------------  IN: 65 mL / OUT: 0 mL / NET: 65 mL      Mode: AC/ CMV (Assist Control/ Continuous Mandatory Ventilation), RR (machine): 14, TV (machine): 450, FiO2: 50, PEEP: 5, ITime: 1, MAP: 11, PIP: 25  acetaminophen    Suspension .. 650 milliGRAM(s) Oral every 6 hours PRN  amLODIPine   Tablet 10 milliGRAM(s) Oral daily  chlorhexidine 0.12% Liquid 15 milliLiter(s) Oral Mucosa every 12 hours  chlorhexidine 4% Liquid 1 Application(s) Topical <User Schedule>  dextrose 40% Gel 15 Gram(s) Oral once PRN  dextrose 5%. 1000 milliLiter(s) (50 mL/Hr) IV Continuous <Continuous>  dextrose 50% Injectable 12.5 Gram(s) IV Push once  dextrose 50% Injectable 25 Gram(s) IV Push once  dextrose 50% Injectable 25 Gram(s) IV Push once  doxazosin 2 milliGRAM(s) Oral at bedtime  enoxaparin Injectable 30 milliGRAM(s) SubCutaneous two times a day  ertapenem  IVPB 1000 milliGRAM(s) IV Intermittent <User Schedule>  fentaNYL    Injectable 25 MICROGram(s) IV Push every 2 hours PRN  glucagon  Injectable 1 milliGRAM(s) IntraMuscular once PRN  insulin lispro (HumaLOG) corrective regimen sliding scale   SubCutaneous every 6 hours  insulin NPH human recombinant 48 Unit(s) SubCutaneous <User Schedule>  insulin NPH human recombinant 44 Unit(s) SubCutaneous <User Schedule>  insulin NPH human recombinant 28 Unit(s) SubCutaneous <User Schedule>  insulin NPH human recombinant 40 Unit(s) SubCutaneous <User Schedule>  lacosamide 200 milliGRAM(s) Oral two times a day  levETIRAcetam  Solution 1000 milliGRAM(s) Oral two times a day  levothyroxine 150 MICROGram(s) Oral daily  metoprolol tartrate 25 milliGRAM(s) Oral two times a day  pantoprazole  Injectable 40 milliGRAM(s) IV Push daily  predniSONE   Tablet 15 milliGRAM(s) Oral <User Schedule>  predniSONE   Tablet 5 milliGRAM(s) Oral at bedtime  simvastatin 20 milliGRAM(s) Oral at bedtime  sodium chloride 0.9% lock flush 10 milliLiter(s) IV Push every 1 hour PRN                          9.3    12.14 )-----------( 461      ( 10 Nile 2020 01:40 )             29.9     06-10    144  |  105  |  25<H>  ----------------------------<  137<H>  4.4   |  26  |  0.63    Ca    9.9      10 Nile 2020 01:40  Phos  3.9     06-10  Mg     2.1     06-10        ABG - ( 10 Nile 2020 06:45 )  pH, Arterial: 7.42  pH, Blood: x     /  pCO2: 40    /  pO2: 192   / HCO3: 26    / Base Excess: 1.7   /  SaO2: 100                     ABG - ( 08 Jun 2020 01:52 )  pH, Arterial: 7.45  pH, Blood: x     /  pCO2: 36    /  pO2: 121   / HCO3: 25    / Base Excess: 1.5   /  SaO2: 99        POCT Blood Glucose.: 210 mg/dL (08 Jun 2020 05:07)  POCT Blood Glucose.: 199 mg/dL (07 Jun 2020 23:37)  POCT Blood Glucose.: 231 mg/dL (07 Jun 2020 17:01)  POCT Blood Glucose.: 231 mg/dL (07 Jun 2020 11:35)      CULTURES:  Culture Results:   >100,000 CFU/ml Escherichia coli ESBL (06-04 @ 01:09)  Culture Results:   No growth to date. (06-03 @ 15:00)      PHYSICAL EXAMINATION:   General: No Acute Distress   Neurological: Intubated, pupils 2mm and reactive, +corneal reflexes, +cough/gag, +overbreathing vent set rate, not following commands, left gaze preference, RUE trace flexion movement, LUE trace flexion movement, BLEs TF L>R  Pulmonary: Mildly diminished at bases  Cardiovascular: S1, S2, Regular Rate and Rhythm   Gastrointestinal: Soft, Nontender, Nondistended

## 2020-06-10 NOTE — PROGRESS NOTE ADULT - SUBJECTIVE AND OBJECTIVE BOX
O:  T(C): 37 (06-10-20 @ 15:00), Max: 37.2 (06-09-20 @ 20:00)  HR: 109 (06-10-20 @ 16:31) (99 - 119)  BP: --  RR: 23 (06-10-20 @ 15:00) (14 - 31)  SpO2: 100% (06-10-20 @ 16:31) (100% - 100%)  06-09-20 @ 07:01  -  06-10-20 @ 07:00  --------------------------------------------------------  IN: 1520 mL / OUT: 1483 mL / NET: 37 mL    06-10-20 @ 07:01  -  06-10-20 @ 18:43  --------------------------------------------------------  IN: 765 mL / OUT: 400 mL / NET: 365 mL    acetaminophen    Suspension .. 650 milliGRAM(s) Oral every 6 hours PRN  amLODIPine   Tablet 10 milliGRAM(s) Oral daily  chlorhexidine 0.12% Liquid 15 milliLiter(s) Oral Mucosa every 12 hours  chlorhexidine 4% Liquid 1 Application(s) Topical <User Schedule>  dextrose 40% Gel 15 Gram(s) Oral once PRN  dextrose 5%. 1000 milliLiter(s) IV Continuous <Continuous>  dextrose 50% Injectable 12.5 Gram(s) IV Push once  dextrose 50% Injectable 25 Gram(s) IV Push once  dextrose 50% Injectable 25 Gram(s) IV Push once  doxazosin 2 milliGRAM(s) Oral at bedtime  enoxaparin Injectable 30 milliGRAM(s) SubCutaneous two times a day  ertapenem  IVPB 1000 milliGRAM(s) IV Intermittent <User Schedule>  fentaNYL    Injectable 25 MICROGram(s) IV Push every 2 hours PRN  glucagon  Injectable 1 milliGRAM(s) IntraMuscular once PRN  insulin lispro (HumaLOG) corrective regimen sliding scale   SubCutaneous every 6 hours  insulin NPH human recombinant 48 Unit(s) SubCutaneous <User Schedule>  insulin NPH human recombinant 44 Unit(s) SubCutaneous <User Schedule>  insulin NPH human recombinant 28 Unit(s) SubCutaneous <User Schedule>  insulin NPH human recombinant 40 Unit(s) SubCutaneous <User Schedule>  lacosamide 200 milliGRAM(s) Oral two times a day  levETIRAcetam  Solution 1000 milliGRAM(s) Oral two times a day  levothyroxine 150 MICROGram(s) Oral daily  metoprolol tartrate 25 milliGRAM(s) Oral two times a day  pantoprazole  Injectable 40 milliGRAM(s) IV Push daily  predniSONE   Tablet 15 milliGRAM(s) Oral <User Schedule>  predniSONE   Tablet 5 milliGRAM(s) Oral at bedtime  simvastatin 20 milliGRAM(s) Oral at bedtime  sodium chloride 0.9% lock flush 10 milliLiter(s) IV Push every 1 hour PRN  Mode: AC/ CMV (Assist Control/ Continuous Mandatory Ventilation), RR (machine): 14, TV (machine): 450, FiO2: 50, PEEP: 5, ITime: 0.8, MAP: 10, PIP: 23  PHYSICAL EXAMINATION:   General: No Acute Distress   Neurological: Intubated, pupils 2mm and reactive, +corneal reflexes, +cough/gag, +overbreathing vent set rate, not following commands, left gaze preference, RUE trace flexion movement, LUE trace flexion movement, BLEs TF L>R  Pulmonary: Mildly diminished at bases  Cardiovascular: S1, S2, Regular Rate and Rhythm   Gastrointestinal: Soft, Nontender, Nondistended       LABS:  Na: 144 (06-10 @ 01:40), 143 (06-08 @ 20:43), 142 (06-08 @ 01:55)  K: 4.4 (06-10 @ 01:40), 3.9 (06-08 @ 20:43), 4.2 (06-08 @ 01:55)  Cl: 105 (06-10 @ 01:40), 109 (06-08 @ 20:43), 104 (06-08 @ 01:55)  CO2: 26 (06-10 @ 01:40), 22 (06-08 @ 20:43), 24 (06-08 @ 01:55)  BUN: 25 (06-10 @ 01:40), 20 (06-08 @ 20:43), 19 (06-08 @ 01:55)  Cr: 0.63 (06-10 @ 01:40), 0.51 (06-08 @ 20:43), 0.52 (06-08 @ 01:55)  Glu: 137(06-10 @ 01:40), 128(06-08 @ 20:43), 210(06-08 @ 01:55)    Hgb: 9.3 (06-10 @ 01:40), 9.2 (06-08 @ 20:43), 10.4 (06-08 @ 01:55)  Hct: 29.9 (06-10 @ 01:40), 30.0 (06-08 @ 20:43), 33.8 (06-08 @ 01:55)  WBC: 12.14 (06-10 @ 01:40), 11.40 (06-08 @ 20:43), 11.05 (06-08 @ 01:55)  Plt: 461 (06-10 @ 01:40), 482 (06-08 @ 20:43), 457 (06-08 @ 01:55)    INR:   PTT:             Assessment and Plan:   · Assessment	  ASSESSMENT:  Left frontal intracerebral hemorrhage, generalized periodic discharges, UTI     NEURO:  Q4 neurochecks  Cont Keppra and Vimpat for GPDs/risk for seizures  Pain management with Tylenol.   Given previous psych history and workup not revealing any cause, NMDA encephalitis testing sent -> NMDA AB neg.    Trach and PEG being discussed with family.   Activity: [] OOB as tolerated [X] Bedrest [X] PT [X] OT [] PMNR      PULM:  Intubated since 5/29  History of COPD   CPAP as tolerated. Will need trach if family desires.  Mild hypoxemia- likely atelectasis. Monitor.    CV:  Keep -160  TTE negative for PFO  HTN: Continue norvasc and metoprolol    Statin for HLD   Tachycardia- possibly related to agitation, CPAP   DC [place]      RENAL:  Na wnl  IVL   Start low dose cardura re: urine retention    GI:  Diet: On Glucerna Tube feeds  Last BM 6/7  GI prophylaxis [] not indicated [X] PPI [] other:      ENDO:   Endo Following - adjusting NPH.   Better control  Continue synthroid. Change to PO  Continue prednisone 15mg, 5 mg at bedtime (was on it at home for RA)  Goal euglycemia (-180)      HEME/ONC:  On SQL.  VTE prophylaxis: [X] SCDs [X] chemoprophylaxis [] high risk of DVT/PE on admission due to:  LE doppler 5/30- No DVT  Anemia- improved     ID:  Afebrile   Ertapnem for ESBL      MISC: Palliative care consulted. Family meeting 6/10 with daughter Heavenly, son (Norris), SW, Ethics and palliative care.       SOCIAL/FAMILY:  [X] updated family via phone  [] family meeting    CODE STATUS:  [X] Full Code [] DNR [] DNI [] Palliative/Comfort Care    DISPOSITION:  [X] ICU [] Stroke Unit [] Floor [] EMU [] RCU [] PCU    [X] Patient is at high risk of neurologic deterioration/death due to: resp failure, seizures, hemorrhage.     Time spent:  45 critical care minutes O: afebrile , FIo2 decreased to 40 %   T(C): 37 (06-10-20 @ 15:00), Max: 37.2 (06-09-20 @ 20:00)  HR: 109 (06-10-20 @ 16:31) (99 - 119)  BP: --  RR: 23 (06-10-20 @ 15:00) (14 - 31)  SpO2: 100% (06-10-20 @ 16:31) (100% - 100%)  06-09-20 @ 07:01  -  06-10-20 @ 07:00  --------------------------------------------------------  IN: 1520 mL / OUT: 1483 mL / NET: 37 mL    06-10-20 @ 07:01  -  06-10-20 @ 18:43  --------------------------------------------------------  IN: 765 mL / OUT: 400 mL / NET: 365 mL    acetaminophen    Suspension .. 650 milliGRAM(s) Oral every 6 hours PRN  amLODIPine   Tablet 10 milliGRAM(s) Oral daily  chlorhexidine 0.12% Liquid 15 milliLiter(s) Oral Mucosa every 12 hours  chlorhexidine 4% Liquid 1 Application(s) Topical <User Schedule>  dextrose 40% Gel 15 Gram(s) Oral once PRN  dextrose 5%. 1000 milliLiter(s) IV Continuous <Continuous>  dextrose 50% Injectable 12.5 Gram(s) IV Push once  dextrose 50% Injectable 25 Gram(s) IV Push once  dextrose 50% Injectable 25 Gram(s) IV Push once  doxazosin 2 milliGRAM(s) Oral at bedtime  enoxaparin Injectable 30 milliGRAM(s) SubCutaneous two times a day  ertapenem  IVPB 1000 milliGRAM(s) IV Intermittent <User Schedule>  fentaNYL    Injectable 25 MICROGram(s) IV Push every 2 hours PRN  glucagon  Injectable 1 milliGRAM(s) IntraMuscular once PRN  insulin lispro (HumaLOG) corrective regimen sliding scale   SubCutaneous every 6 hours  insulin NPH human recombinant 48 Unit(s) SubCutaneous <User Schedule>  insulin NPH human recombinant 44 Unit(s) SubCutaneous <User Schedule>  insulin NPH human recombinant 28 Unit(s) SubCutaneous <User Schedule>  insulin NPH human recombinant 40 Unit(s) SubCutaneous <User Schedule>  lacosamide 200 milliGRAM(s) Oral two times a day  levETIRAcetam  Solution 1000 milliGRAM(s) Oral two times a day  levothyroxine 150 MICROGram(s) Oral daily  metoprolol tartrate 25 milliGRAM(s) Oral two times a day  pantoprazole  Injectable 40 milliGRAM(s) IV Push daily  predniSONE   Tablet 15 milliGRAM(s) Oral <User Schedule>  predniSONE   Tablet 5 milliGRAM(s) Oral at bedtime  simvastatin 20 milliGRAM(s) Oral at bedtime  sodium chloride 0.9% lock flush 10 milliLiter(s) IV Push every 1 hour PRN  Mode: AC/ CMV (Assist Control/ Continuous Mandatory Ventilation), RR (machine): 14, TV (machine): 450, FiO2: 50, PEEP: 5, ITime: 0.8, MAP: 10, PIP: 23  PHYSICAL EXAMINATION:   General: No Acute Distress   Neurological: Intubated, pupils 2mm and reactive, +corneal reflexes, +cough/gag, +overbreathing vent set rate, not following commands, left gaze preference, RUE trace flexion movement, LUE trace flexion movement, BLEs TF L>R  Pulmonary: Mildly diminished at bases  Cardiovascular: S1, S2, Regular Rate and Rhythm   Gastrointestinal: Soft, Nontender, Nondistended       LABS:  Na: 144 (06-10 @ 01:40), 143 (06-08 @ 20:43), 142 (06-08 @ 01:55)  K: 4.4 (06-10 @ 01:40), 3.9 (06-08 @ 20:43), 4.2 (06-08 @ 01:55)  Cl: 105 (06-10 @ 01:40), 109 (06-08 @ 20:43), 104 (06-08 @ 01:55)  CO2: 26 (06-10 @ 01:40), 22 (06-08 @ 20:43), 24 (06-08 @ 01:55)  BUN: 25 (06-10 @ 01:40), 20 (06-08 @ 20:43), 19 (06-08 @ 01:55)  Cr: 0.63 (06-10 @ 01:40), 0.51 (06-08 @ 20:43), 0.52 (06-08 @ 01:55)  Glu: 137(06-10 @ 01:40), 128(06-08 @ 20:43), 210(06-08 @ 01:55)    Hgb: 9.3 (06-10 @ 01:40), 9.2 (06-08 @ 20:43), 10.4 (06-08 @ 01:55)  Hct: 29.9 (06-10 @ 01:40), 30.0 (06-08 @ 20:43), 33.8 (06-08 @ 01:55)  WBC: 12.14 (06-10 @ 01:40), 11.40 (06-08 @ 20:43), 11.05 (06-08 @ 01:55)  Plt: 461 (06-10 @ 01:40), 482 (06-08 @ 20:43), 457 (06-08 @ 01:55)    INR:   PTT:       ASSESSMENT:  Left frontal intracerebral hemorrhage, generalized periodic discharges, UTI     NEURO:  Q4 neurochecks  Cont Keppra and Vimpat for GPDs/risk for seizures  Pain management with Tylenol.   Given previous psych history and workup not revealing any cause, NMDA encephalitis testing sent -> NMDA AB neg.    Trach and PEG being discussed with family.   Activity: [] OOB as tolerated [X] Bedrest [X] PT [X] OT [] PMNR      PULM:  Intubated since 5/29  History of COPD   CPAP as tolerated. Will need trach if family desires.  Mild hypoxemia- likely atelectasis. Monitor.    CV:  Keep -160  TTE negative for PFO  HTN: Continue norvasc and metoprolol    Statin for HLD   Tachycardia- possibly related to agitation, CPAP   DC [place]      RENAL:  Na wnl  IVL   Start low dose cardura re: urine retention    GI:  Diet: On Glucerna Tube feeds  Last BM 6/10  GI prophylaxis [] not indicated [X] PPI [] other:      ENDO:   Endo Following - adjusting NPH. 12am: NPH 40 units  6am: NPH 48 units  12pm: 44 units  6pm: 28 units  Better control  Continue synthroid. Change to PO  Continue prednisone 15mg, 5 mg at bedtime (was on it at home for RA)  Goal euglycemia (-180)      HEME/ONC:  On SQL.  VTE prophylaxis: [X] SCDs [X] chemoprophylaxis [] high risk of DVT/PE on admission due to:  LE doppler 5/30- No DVT  Anemia- improved     ID:  Afebrile   Ertapnem for ESBL      MISC: Palliative care consulted. Family meeting 6/10 with daughter Heavenly, son (Norris), SW, Ethics and palliative care.       SOCIAL/FAMILY:  [X] updated family via phone  [] family meeting    CODE STATUS:  [X] Full Code [] DNR [] DNI [] Palliative/Comfort Care    DISPOSITION:  [X] ICU [] Stroke Unit [] Floor [] EMU [] RCU [] PCU    [X] Patient is at high risk of neurologic deterioration/death due to: resp failure, seizures, hemorrhage.     Time spent:  30 critical care minutes

## 2020-06-10 NOTE — CONSULT NOTE ADULT - SUBJECTIVE AND OBJECTIVE BOX
CLINICAL SUMMARY  64 year old female admitted with change in mental status and possible seizure activity. Found on CT Scan to have left frontal intracerebral hemorrhage and cerebral edema which was not present on a February 20, 2020 CT Scan.  Patient intubated and sedated in neurosurgical ICU.   Patient and family are well known to the Ethics Service (Feb/March 2019 and January 2020) with a past medical history of functional paraplegia (bedbound), hypothyroidism, pulmonary hypertension (HTN), Diabetes Mellitus (DM) on insulin, COPD/CHRIS (on nocturnal CPAP and 3L home oxygen), Rheumatoid Arthritis on chronic prednisone, chronic tremors, Small Bowel Obstruction s/p exploratory laparotomy with lysis of adhesions (2018) (4 retention sutures) complicated by evisceration secondary to a coughing episode s/p exploratory laparotomy(2018) (6 retention sutures) complicated by multifocal pneumonia, developed hypercapnic respiratory failure requiring intubation 1/6/19, s/p bronch 1/9/19, and extubated on 1/24/19, course further complicated by Enterococci bacteremia. Ethics requested prior to extubation in January 2019 to explore tracheostomy and son Norris stating that his mother was adamant about not wanting a tracheostomy, forcing extended utilization of endotracheoal tube. Subsequently patient was successfully extubated and discharged to skilled nursing facility. Subsequent Ethics consult in January 2020 revolved around the patient needing long term antibiotics as well as the need for restraints due to combativeness.   During this admission, the team has been primarily in contact with ana maría Pisano who has told the team on 6/4/2020 that he needs the weekend to discuss with family and for them to not call him. Team was concerned over loss of contact with surrogate decision maker and reached out to Ethics as a resource and an initial consult is charted.    Heavenly Edgar was contacted and phone conference on 6/8/2020 is in detail below. Today, team was able to phone conference with both Heavenly and Norris.   Prognosis Estimate (survival in hrs, days, wks, mos, yrs):  guarded   Patient Decision-Making Capacity:  Has Capacity  No capacity- medically sedated and intubated  Patient Aware of:  Diagnosis:  Yes   No   Unknown    Prognosis:   Yes    No   Unknown       Name of medical decision-maker should patient lack capacity (relationship): Heavenly Hernández, Daughter [phone] and Norris Rush III, Son [phone]. Both named as equal decision makers in Living Will dated 9/3/19.  Role:   Health Care Agent     Legal Surrogate     Other Decision-Maker (i.e., HCA or Surrogate) Aware of:  Diagnosis: Yes   No      Prognosis:   Yes     No  Other Stake-Holders:  other adult children  Evidence of Patient’s Preference of Life-Sustaining Treatment (Written or Oral): LIVING WILL SEPT 3, 2019  Patient wants all aspects of full code but states "My wish is for all medical options to be exhausted before exploring any other medical options. My daughter Heavenly Hernández and son Norris Rush III must be informed and agree on other medical options which includes tracheostomy. I don’t give permission for any health care provider to issue a DNR/DNI order on my behalf and should not be a part of my medical records. I am full code all the way!"  Resuscitation status:   DNR:  Yes   No      DNI:  Yes   No            Discussion:   6/8/2020 [id number] phone conference with daughter Heavenly, Lilo Armenta NP Palliative Medicine, Dr. Yas Ruggiero, Neurology Fellow, BHAVIN Briones and Ethics Attending SAM Altamirano.  Heavenly was able to speak to her mother's current condition and asked pertinent questions regarding prognosis and spoke about her mother's concept of quality of life.  All questions answered regarding tracheostomy and percutaneous endoscopic gastrostomy tube for enteral nutrition and Heavenly was able to speak back and acknowledge that her mother's concept of quality of life would include being able to speak and not be dependent on machines. Also questioned whether her mother would even be able to have a PEG considering the abdominal wound. Heavenly expressed gratefulness for this additional conversation and will maintain contact. She has been in conversation with all her siblings and they do understand the gravity of the situation, that the stroke has changed their mother’s health status.     Adri 10, [id number]: Phone conference with Heavenly Akins (daughter) Norris Rush (son), ELIAZAR Armenta NP Palliative, RICHARD Purcell, Fellow, SAM DELCID, PEYMAN Valenzuela, Ethics, SAM Altamirano Ethics.  Dr. Purcell updated both children. Currently patient has not been tolerating mechanical ventilation weaning trials and has had to remain on mechanical ventilation. Patient is breathing over vent at times, but is not able to sustain breathing on her own. This is inability continues to progress instead of getting better showing that the injury itself is not resolving.    Heavenly provided the families narrative of hope (her mother has overcome so many health obstacles) and the team explained further that this acute injury has a less than 5% chance of her mother even marginally recovering, explained that her baseline will involve mechanical ventilation in a long term care facility. Heavenly reiterated that her mother’s ability to speak is key to her quality of life.    Heavenly and Norris were reminded of their mothers adamant refusal of tracheostomy last year and Burdick asserted that has not changed they will not agree to one. Norris’s stance is that "the family will make that decision."     Heavenly asked if the patient’s sister could come as well as other family members. She also stated that she will not make any decisions alone as it was her mother’s desire that both she and Norris work together on this.   Ethics asked what decisional outcome they foresee for their mother since she doesn’t want a tracheostomy and Norris stated that will be for the family to decide and expressed his distrust in the words used by the ethicist. Emotional support provided and brought conversation back to his mother and that it would be best for family to come. Explained that her medical status will not progress well and that there is a high likely oden that natural death will occur. Heavenly was able to speak to the inevitable and will be traveling to be with her mom as well as the other family members. Social Work will facilitate visitation

## 2020-06-10 NOTE — PROGRESS NOTE ADULT - PROBLEM SELECTOR PLAN 6
Due to the patient's health status and restrictions on visitation during the [facility], the Advance Care Planning service was performed via____phone_________ with patients __daughter/hcp Heavenly and son Norris_________with whom the discussion took place.  Conference call with Neto/Dr Negar Hoover[facility], Melanie/BHAVIN, and this writer.  Updated family on patients status.  Family aware patient unable to wean successfully, will need to have trach and peg if that is the ultimate family choice.   Family wish to bring in extended family ( other siblings and patients sister) to hear the doctors evaluation of patients status.  After the call, I spoke to Norris and Heavenly to review Full Code status and both agree to continue this directive.   Additional meetings to take place next week unless complications arise.

## 2020-06-10 NOTE — PROGRESS NOTE ADULT - ATTENDING COMMENTS
Patient became tachypneic on pressure support and did not tolerated. Family requesting another meeting with additional family members to continue goals of care.

## 2020-06-10 NOTE — PROGRESS NOTE ADULT - PROBLEM SELECTOR PLAN 1
-test BG Q6h  -c/w NPH as follows  12am: NPH 40 units  6am: NPH 48 units  12pm: 44 units  6pm: 28 units  -Hold NPH if TF off. Can reduce dose if BG less than 100mg/dl.   -Please notify endocrine if any changes in steroid regimen or TF regimen so we can adjust insulin regimen accordingly.

## 2020-06-10 NOTE — CONSULT NOTE ADULT - ASSESSMENT
Bioethics analysis:    The primary ethical principle involved in this case is respect for patient autonomy vs. beneficence and non-maleficence.     The bioethical principle of autonomy – here represented by a thoughtful consideration of the appropriate surrogate to make decisions for a patient without capacity. Physicians and health care providers have a role in protecting the patient from harm by safeguarding the patient’s best interests through determining the appropriate qualifications surrogate decision maker. In this case, the health team is applauded for creating an atmosphere of clarity in communication regarding finding the appropriate primary surrogate decision maker. The patient when she was able to do so, appointed both her son and daughter in a living will as her decision makers.     While this isn’t a [place] Health Care proxy, therefore not a medical legal document, it is clear and convincing evidence of the patient’s desires of  these two adult children from the rest of her adult children to follow through with her medical decisions.  The [facility] Family Health Care Decision Act specifically states through hierarchy that all adult children are a patient’s decision maker, making the assumption that that all have equal say in decisions. In this case Norris, even though named by his mother, after Heavenly has shown to be inconsistent, capricious and arbitrary when confronted with certain aspects of medical decisions that warrant his attention. This has been a consistent pattern of behavior which when the patient was able to speak for herself in past ethics consults, stated that Heavenly be her Health Care Proxy, but in deference to her son’s devotion, has allowed him to be alternate (and how it is written in the living will from 2019, Burdick first, Norris second.)      In this case, because of the family dynamics, the [facility] states that "one person" from the list can be appointed the surrogate. While the [facility] does not specify who identifies the surrogate when more than one person is in the highest category, it necessarily will be the responsibility of the hospital or nursing home to identify the surrogate. In most cases, this should be resolved without difficulty - usually the adult sons and daughters can agree upon the surrogate. In other cases it will be apparent to the hospital staff that one of the patient's adult children is best able to speak of the patient's previous wishes and, if the patient's wishes are not known, the patient's best interests. If there is a dispute, efforts should be made to resolve it informally if possible (e.g., through team meetings, ethics consultation or mediation or the hospital ethics process) or else the matter should be referred to the Ethics Review Committee.1    As Heavenyl has been a steady and guiding voice, and was listed at primary on her mother’s living will it is prudent continue to contact and speak with Heavenly first in these delicate discussions.     Generally, the bioethical principle of beneficence promotes the best possible health or quality of living or dying: with aggressive interventions when these help prolong life with "good quality," with comfort care when cure and remission are not possible and the aim is to achieve the best "quality of life possible given the current circumstances". This principle tends to go hand-in-hand with the other bioethical principle non-maleficence, or do no harm. This principle promotes the physician’s virtue to provide care that wouldn’t cause harm to the patient or provide care that would not be medically beneficial to the patient.1    In keeping with the duty of non-maleficence, there is an ethical dilemma in this case between the technically possible (LSTs) and medically reasonable (best available evidence that the intervention will result in improved outcome). An acceptable outcome can be defined from the patient’s and families perspective of quality of life (the ability to engage in life tasks and derive satisfaction from doing so or by resulting functional status allows patient to engage in valued life tasks and derive sufficient satisfaction from doing so) Quality of life judgments rests in the judgment of the patient and surrogates reliable account of the patient’s valued life tasks and functional status. Physicians have no obligation to provide non-beneficial treatments without an expectation of benefit concordant with the goal of intervention. Another approach is to review this patient’s prognosis – progressive continual decline in health status regardless of intervention. 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 2    Physicians are under no legal or ethical obligation to offer treatments that would 1) provide no medically indicated benefit or 2) impose unnecessary risk or burden to the patient. This concept of proportionality encompasses cardiopulmonary resuscitation (CPR) and other life sustaining treatments as well. It is reasonable to consider palliation and burden of suffering. In honoring the patient’s moral status, the issue remains that the patient has a poor prognosis, will progressively deteriorate that will require comfort and ease of suffering.     Goals of care now are to maintain current treatment, providing Burdick with sufficient information to make an informed decision towards end of life that honors the patient’s moral status3.  The health team is commended for their virtue in providing intensive care treatment that has allowed this patient to survive to this point. In this specific case, in accordance with the PALLIATIVE CARE ACCESS ACT ([facility] SECTION 2997-D) The attending health care practitioner is required to provide patients with a terminal illness not only prognosis, risks and benefits of treatment options but also patient’s legal rights to symptom management at the end of life. Fundamental to a palliative approach is the aim to reduce suffering and improve the quality of life for patients and their families through identification and management of pain and physical, social, psychological, spiritual, Zoroastrian and cultural needs.    CONCLUSION    ·	As there has been questions regarding a decision maker, it is ethically prudent to maintain Burdick as primary decision maker.   ·	Today’s conversation brought a key decision where in past encounters the patient adamantly refused tracheostomy and that has not changed according to the patient’s daughter.  ·	Continuing with Palliative Medicine as the patient has been in a gradual decline is key as well as preparing the family for her eventual death in the form of pre-bereavement counseling should be considered as well as facilitating visitation for family for final good byes.     Ethics to remain available. Thank you for this challenging consult.     Sneha Altamirano M.Div., BSN, [facility], [facility]   Clinical Ethics Consultant   Division of Medical Ethics   [phone]    WRITTEN BY GALI OWENS, BSN, DARIA-C   DISCUSSED WITH DR. SG ANDERSON [facility] and Paul[facility] Director of Medical Ethics  MORE THAN 50% OF THE TIME OF THIS CONSULTATION WAS SPENT IN COORDINATION OF CARE OF PATIENT        REFERENCES    1<[email or web address]> (revised September 8, 2010)    2 ELIAZAR Swartz., & PEYMAN Londono. (2012). Addressing requests by patients for nonbeneficial interventions. Neri, 307(2), 149-150.   3 ELIAZAR Silveira, Siegler, M., & AMBER Rodriguez (2015). Clinical Ethics: A Practical Approach to Ethical Decisions in Clinical Medicine, 8E. [facility].    4RAMO Kc, & ELIAZAR Shepard (2015). Easing the [place] of Surrogate Decision Making: The Role of a Do-Not-Escalate- Treatment Order. Journal of palliative medicine, 18(3), 306-309.

## 2020-06-10 NOTE — PROGRESS NOTE ADULT - SUBJECTIVE AND OBJECTIVE BOX
SUBJECTIVE AND OBJECTIVE:  INTERVAL HPI/OVERNIGHT EVENTS: No changes, continues to fail weaning trials, poor neurological exam    DNR on chart:   Allergies    Depakote (Other)  Seroquel (Other (Severe))    Intolerances    MEDICATIONS  (STANDING):  amLODIPine   Tablet 10 milliGRAM(s) Oral daily  chlorhexidine 0.12% Liquid 15 milliLiter(s) Oral Mucosa every 12 hours  chlorhexidine 4% Liquid 1 Application(s) Topical <User Schedule>  dextrose 5%. 1000 milliLiter(s) (50 mL/Hr) IV Continuous <Continuous>  dextrose 50% Injectable 12.5 Gram(s) IV Push once  dextrose 50% Injectable 25 Gram(s) IV Push once  dextrose 50% Injectable 25 Gram(s) IV Push once  doxazosin 2 milliGRAM(s) Oral at bedtime  enoxaparin Injectable 30 milliGRAM(s) SubCutaneous two times a day  ertapenem  IVPB 1000 milliGRAM(s) IV Intermittent <User Schedule>  insulin lispro (HumaLOG) corrective regimen sliding scale   SubCutaneous every 6 hours  insulin NPH human recombinant 48 Unit(s) SubCutaneous <User Schedule>  insulin NPH human recombinant 44 Unit(s) SubCutaneous <User Schedule>  insulin NPH human recombinant 28 Unit(s) SubCutaneous <User Schedule>  insulin NPH human recombinant 40 Unit(s) SubCutaneous <User Schedule>  lacosamide 200 milliGRAM(s) Oral two times a day  levETIRAcetam  Solution 1000 milliGRAM(s) Oral two times a day  levothyroxine 150 MICROGram(s) Oral daily  metoprolol tartrate 25 milliGRAM(s) Oral two times a day  pantoprazole  Injectable 40 milliGRAM(s) IV Push daily  predniSONE   Tablet 15 milliGRAM(s) Oral <User Schedule>  predniSONE   Tablet 5 milliGRAM(s) Oral at bedtime  simvastatin 20 milliGRAM(s) Oral at bedtime    MEDICATIONS  (PRN):  acetaminophen    Suspension .. 650 milliGRAM(s) Oral every 6 hours PRN Temp greater or equal to 38C (100.4F), Mild Pain (1 - 3)  dextrose 40% Gel 15 Gram(s) Oral once PRN Blood Glucose LESS THAN 70 milliGRAM(s)/deciliter  fentaNYL    Injectable 25 MICROGram(s) IV Push every 2 hours PRN Severe Pain (7 - 10)  glucagon  Injectable 1 milliGRAM(s) IntraMuscular once PRN Glucose LESS THAN 70 milligrams/deciliter  sodium chloride 0.9% lock flush 10 milliLiter(s) IV Push every 1 hour PRN Pre/post blood products, medications, blood draw, and to maintain line patency      ITEMS UNCHECKED ARE NOT PRESENT    PRESENT SYMPTOMS: [ x]Unable to obtain due to poor mentation   Source if other than patient:  [ ]Family   [ ]Team     Pain:  [ ]yes [ ]no  QOL impact -   Location -                    Aggravating factors -  Quality -  Radiation -  Timing-  Severity (0-10 scale):  Minimal acceptable level (0-10 scale):     Dyspnea:                           [ ]Mild [ ]Moderate [ ]Severe  Anxiety:                             [ ]Mild [ ]Moderate [ ]Severe  Fatigue:                             [ ]Mild [ ]Moderate [ ]Severe  Nausea:                             [ ]Mild [ ]Moderate [ ]Severe  Loss of appetite:              [ ]Mild [ ]Moderate [ ]Severe  Constipation:                    [ ]Mild [ ]Moderate [ ]Severe    PAIN AD Score:	0  [email or web address] (Ctrl + left click to view)    Other Symptoms:  [x ]All other review of systems negative     Palliative Performance Status Version 2:     10    %      http://npcrc.org/files/news/palliative_performance_scale_ppsv2.pdf  PHYSICAL EXAM:  Vital Signs Last 24 Hrs  T(C): 36.2 (10 Nile 2020 04:00), Max: 37.2 (09 Jun 2020 20:00)  T(F): 97.2 (10 Nile 2020 04:00), Max: 98.9 (09 Jun 2020 20:00)  HR: 107 (10 [place] 2020 09:55) (99 - 119)  BP: --  BP(mean): --  RR: 17 (10 [place] 2020 08:00) (15 - 38)  SpO2: 100% (10 [place] 2020 09:55) (100% - 100%) I&O's Summary    09 Jun 2020 07:01  -  10 [place] 2020 07:00  --------------------------------------------------------  IN: 1520 mL / OUT: 1483 mL / NET: 37 mL    10 [place] 2020 07:01  -  10 [place] 2020 10:16  --------------------------------------------------------  IN: 130 mL / OUT: 0 mL / NET: 130 mL       GENERAL:  [ ]Alert  [ ]Oriented x   [ ]Lethargic  [ ]Cachexia  [x]Unarousable  [ ]Verbal  [x ]Non-Verbal  Behavioral:   [ ]Anxiety  [ ]Delirium [ ]Agitation [ ]Other  HEENT:  [ ]Normal   [ ]Dry mouth   [x]ET Tube/Trach  [ ]Oral lesions  PULMONARY:   [ ]Clear [ ]Tachypnea  [ ]Audible excessive secretions   [ ]Rhonchi        [ ]Right [ ]Left [ ]Bilateral  [ ]Crackles        [ ]Right [ ]Left [ ]Bilateral  [ ]Wheezing     [ ]Right [ ]Left [ ]Bilateral  [ ]Diminished BS [ ] Right [ ]Left [ ]Bilateral  CARDIOVASCULAR:    [ ]Regular [ x]Irregular [ ]Tachy  [ ]Ubaldo [ ]Murmur [ ]Other  GASTROINTESTINAL:  [ x]Soft  [ ]Distended   [ ]+BS  [ ]Non tender [ ]Tender  [ ]PEG [ x]OGT/ NGT   Last BM:   06-03-20 @ 07:01  -  06-04-20 @ 07:00  --------------------------------------------------------  OUT: 12 mL    06-04-20 @ 07:01  -  06-05-20 @ 07:00  --------------------------------------------------------  OUT: 50 mL    06-06-20 @ 07:01  -  06-07-20 @ 07:00  --------------------------------------------------------  OUT: 1 mL    06-07-20 @ 07:01  -  06-08-20 @ 07:00  --------------------------------------------------------  OUT: 1 mL    06-08-20 @ 07:01  -  06-09-20 @ 07:00  --------------------------------------------------------  OUT: 4 mL    06-09-20 @ 07:01  -  06-10-20 @ 07:00  --------------------------------------------------------  OUT: 303 mL       GENITOURINARY:  [ ]Normal [ xIncontinent   [ ]Oliguria/Anuria   [ ]Antoine  MUSCULOSKELETAL:   [ ]Normal   [ ]Weakness  [ x]Bed/Wheelchair bound [ ]Edema  NEUROLOGIC:   [ ]No focal deficits  [x ] Cognitive impairment  [ ] Dysphagia [ ]Dysarthria [ ] Paresis [ ]Other   SKIN:   [ ]Normal  [ ]Rash   [ ]Pressure ulcer(s) [ ]y [ ]n present on admission    CRITICAL CARE:  [ ]Shock Present  [ ]Septic [ ]Cardiogenic [ ]Neurologic [ ]Hypovolemic  [ ]Vasopressors [ ]Inotropes  [x ]Respiratory failure present [x ]Mechanical Ventilation [ ]Non-invasive ventilatory support [ ]High-Flow  [x ]Acute  [ x]Chronic [ ]Hypoxic  [ ]Hypercarbic [ ]Other  [ ]Other organ failure     LABS:                        9.3    12.14 )-----------( 461      ( 10 Nile 2020 01:40 )             29.9   06-10    144  |  105  |  25<H>  ----------------------------<  137<H>  4.4   |  26  |  0.63    Ca    9.9      10 Nile 2020 01:40  Phos  3.9     06-10  Mg     2.1     06-10          RADIOLOGY & ADDITIONAL STUDIES:  < from: CT Head No Cont (06.02.20 @ 09:02) >      INTERPRETATION:  INDICATIONS:  left ich    TECHNIQUE:  Serial axial images were obtained from the skull base to the vertex without intravenous contrast.    COMPARISONEXAMINATION: 6/1/2020    FINDINGS:    VENTRICLES AND SULCI:  Unchanged mass effect on the left frontal horn of the lateral ventricle related to the left frontal hematoma as seen on the prior without significant interval change compared with the priorstudy. Small amount of layering hemorrhage in the occipital horns of the lateral ventricle  INTRA-AXIAL: No change in appearance of large left frontal hematoma with mass effect on the left frontal horn. Some associated subarachnoid hemorrhage is seen  EXTRA-AXIAL:  Subtle subarachnoid hemorrhage in the left sylvian region and in the interhemispheric fissure as seen on the prior as well.  VISUALIZED SINUSES:  Clear.  VISUALIZED MASTOIDS:  Clear.  CALVARIUM:  Normal.  MISCELLANEOUS: Patient is intubated with an NG tube    IMPRESSION:  No change from 6/1/2020        < end of copied text >        Protein Calorie Malnutrition Present: [ ]mild [ ]moderate [ ]severe [ ]underweight [ ]morbid obesity  https://www.andeal.org/vault/2440/web/files/ONC/Table_Clinical%20Characteristics%20to%20Document%20Malnutrition-White%20JV%20et%20al%851939.pdf    Height (cm): 162.6 (05-30-20 @ 00:49), 152.4 (05-29-20 @ 19:18), 154.9 (05-12-20 @ 21:48)  Weight (kg): 99.6 (05-30-20 @ 00:49), 136.1 (05-29-20 @ 19:18), 98.2 (05-12-20 @ 23:57)  BMI (kg/m2): 37.7 (05-30-20 @ 00:49), 51.5 (05-30-20 @ 00:49), 58.6 (05-29-20 @ 19:18)    [ ]PPSV2 < or = 30%  [ ]significant weight loss [x ]poor nutritional intake [ ]anasarca   Albumin, Serum: 2.7 g/dL (05-29-20 @ 20:22)   [ ]Artificial Nutrition    REFERRALS:   [ ]Chaplaincy  [ ]Hospice  [ ]Child Life  [x]Social Work  [ ]Case management [ ]Holistic Therapy     Goals of Care Document:

## 2020-06-10 NOTE — PROGRESS NOTE ADULT - ASSESSMENT
ASSESSMENT:  Left frontal intracerebral hemorrhage, generalized periodic discharges, UTI     NEURO:  Q4 neurochecks  Cont Keppra and Vimpat for GPDs/risk for seizures  Pain management with Tylenol.   Given previous psych history and workup not revealing any cause, NMDA encephalitis testing sent -> NMDA AB neg.    Trach and PEG being discussed with family.   Activity: [] OOB as tolerated [X] Bedrest [X] PT [X] OT [] PMNR      PULM:  Intubated since 5/29  History of COPD   CPAP as tolerated. Will need trach if family desires.  Mild hypoxemia- likely atelectasis. Monitor.    CV:  Keep -160  TTE negative for PFO  HTN: Continue norvasc and metoprolol    Statin for HLD   Tachycardia- possibly related to agitation, CPAP   DC Zahra      RENAL:  Na wnl  IVL   Start low dose cardura re: urine retention    GI:  Diet: On Glucerna Tube feeds  Last BM 6/7  GI prophylaxis [] not indicated [X] PPI [] other:      ENDO:   Endo Following - adjusting NPH.   Better control  Continue synthroid. Change to PO  Continue prednisone 15mg, 5 mg at bedtime (was on it at home for RA)  Goal euglycemia (-180)      HEME/ONC:  On SQL.  VTE prophylaxis: [X] SCDs [X] chemoprophylaxis [] high risk of DVT/PE on admission due to:  LE doppler 5/30- No DVT  Anemia- improved     ID:  Afebrile   Ertapnem for ESBL      MISC: Palliative care consulted. Family meeting 6/10 with daughter Heavenly, son (Norris), SW, Ethics and palliative care.       SOCIAL/FAMILY:  [X] updated family via phone  [] family meeting    CODE STATUS:  [X] Full Code [] DNR [] DNI [] Palliative/Comfort Care    DISPOSITION:  [X] ICU [] Stroke Unit [] Floor [] EMU [] RCU [] PCU    [X] Patient is at high risk of neurologic deterioration/death due to: resp failure, seizures, hemorrhage.     Time spent:  45 critical care minutes

## 2020-06-11 LAB
ANION GAP SERPL CALC-SCNC: 15 MMOL/L — SIGNIFICANT CHANGE UP (ref 5–17)
BUN SERPL-MCNC: 29 MG/DL — HIGH (ref 7–23)
CALCIUM SERPL-MCNC: 9.2 MG/DL — SIGNIFICANT CHANGE UP (ref 8.4–10.5)
CHLORIDE SERPL-SCNC: 109 MMOL/L — HIGH (ref 96–108)
CO2 SERPL-SCNC: 25 MMOL/L — SIGNIFICANT CHANGE UP (ref 22–31)
CREAT SERPL-MCNC: 0.58 MG/DL — SIGNIFICANT CHANGE UP (ref 0.5–1.3)
GLUCOSE BLDC GLUCOMTR-MCNC: 110 MG/DL — HIGH (ref 70–99)
GLUCOSE BLDC GLUCOMTR-MCNC: 111 MG/DL — HIGH (ref 70–99)
GLUCOSE BLDC GLUCOMTR-MCNC: 112 MG/DL — HIGH (ref 70–99)
GLUCOSE BLDC GLUCOMTR-MCNC: 171 MG/DL — HIGH (ref 70–99)
GLUCOSE BLDC GLUCOMTR-MCNC: 175 MG/DL — HIGH (ref 70–99)
GLUCOSE BLDC GLUCOMTR-MCNC: 82 MG/DL — SIGNIFICANT CHANGE UP (ref 70–99)
GLUCOSE SERPL-MCNC: 107 MG/DL — HIGH (ref 70–99)
HCT VFR BLD CALC: 27.7 % — LOW (ref 34.5–45)
HGB BLD-MCNC: 8.5 G/DL — LOW (ref 11.5–15.5)
MAGNESIUM SERPL-MCNC: 2.2 MG/DL — SIGNIFICANT CHANGE UP (ref 1.6–2.6)
MCHC RBC-ENTMCNC: 29.6 PG — SIGNIFICANT CHANGE UP (ref 27–34)
MCHC RBC-ENTMCNC: 30.7 GM/DL — LOW (ref 32–36)
MCV RBC AUTO: 96.5 FL — SIGNIFICANT CHANGE UP (ref 80–100)
NRBC # BLD: 0 /100 WBCS — SIGNIFICANT CHANGE UP (ref 0–0)
PHOSPHATE SERPL-MCNC: 3.8 MG/DL — SIGNIFICANT CHANGE UP (ref 2.5–4.5)
PLATELET # BLD AUTO: 468 K/UL — HIGH (ref 150–400)
POTASSIUM SERPL-MCNC: 4 MMOL/L — SIGNIFICANT CHANGE UP (ref 3.5–5.3)
POTASSIUM SERPL-SCNC: 4 MMOL/L — SIGNIFICANT CHANGE UP (ref 3.5–5.3)
RBC # BLD: 2.87 M/UL — LOW (ref 3.8–5.2)
RBC # FLD: 19.7 % — HIGH (ref 10.3–14.5)
SODIUM SERPL-SCNC: 149 MMOL/L — HIGH (ref 135–145)
T3 SERPL-MCNC: 55 NG/DL — LOW (ref 80–200)
T4 AB SER-ACNC: 5.2 UG/DL — SIGNIFICANT CHANGE UP (ref 4.6–12)
TSH SERPL-MCNC: 8.26 UIU/ML — HIGH (ref 0.27–4.2)
WBC # BLD: 9.51 K/UL — SIGNIFICANT CHANGE UP (ref 3.8–10.5)
WBC # FLD AUTO: 9.51 K/UL — SIGNIFICANT CHANGE UP (ref 3.8–10.5)

## 2020-06-11 PROCEDURE — 71045 X-RAY EXAM CHEST 1 VIEW: CPT | Mod: 26

## 2020-06-11 PROCEDURE — 99292 CRITICAL CARE ADDL 30 MIN: CPT

## 2020-06-11 PROCEDURE — 99232 SBSQ HOSP IP/OBS MODERATE 35: CPT

## 2020-06-11 PROCEDURE — 99291 CRITICAL CARE FIRST HOUR: CPT

## 2020-06-11 RX ORDER — LACOSAMIDE 50 MG/1
200 TABLET ORAL
Refills: 0 | Status: DISCONTINUED | OUTPATIENT
Start: 2020-06-11 | End: 2020-07-10

## 2020-06-11 RX ORDER — HUMAN INSULIN 100 [IU]/ML
24 INJECTION, SUSPENSION SUBCUTANEOUS
Refills: 0 | Status: DISCONTINUED | OUTPATIENT
Start: 2020-06-11 | End: 2020-06-11

## 2020-06-11 RX ORDER — HUMAN INSULIN 100 [IU]/ML
40 INJECTION, SUSPENSION SUBCUTANEOUS
Refills: 0 | Status: DISCONTINUED | OUTPATIENT
Start: 2020-06-11 | End: 2020-06-17

## 2020-06-11 RX ORDER — HUMAN INSULIN 100 [IU]/ML
24 INJECTION, SUSPENSION SUBCUTANEOUS
Refills: 0 | Status: DISCONTINUED | OUTPATIENT
Start: 2020-06-11 | End: 2020-06-12

## 2020-06-11 RX ADMIN — LACOSAMIDE 200 MILLIGRAM(S): 50 TABLET ORAL at 06:04

## 2020-06-11 RX ADMIN — Medication 5 MILLIGRAM(S): at 21:56

## 2020-06-11 RX ADMIN — FENTANYL CITRATE 25 MICROGRAM(S): 50 INJECTION INTRAVENOUS at 15:30

## 2020-06-11 RX ADMIN — AMLODIPINE BESYLATE 10 MILLIGRAM(S): 2.5 TABLET ORAL at 05:52

## 2020-06-11 RX ADMIN — ERTAPENEM SODIUM 120 MILLIGRAM(S): 1 INJECTION, POWDER, LYOPHILIZED, FOR SOLUTION INTRAMUSCULAR; INTRAVENOUS at 14:56

## 2020-06-11 RX ADMIN — CHLORHEXIDINE GLUCONATE 15 MILLILITER(S): 213 SOLUTION TOPICAL at 06:10

## 2020-06-11 RX ADMIN — Medication 2: at 11:50

## 2020-06-11 RX ADMIN — Medication 2 MILLIGRAM(S): at 21:56

## 2020-06-11 RX ADMIN — LACOSAMIDE 200 MILLIGRAM(S): 50 TABLET ORAL at 17:48

## 2020-06-11 RX ADMIN — Medication 15 MILLIGRAM(S): at 05:52

## 2020-06-11 RX ADMIN — HUMAN INSULIN 48 UNIT(S): 100 INJECTION, SUSPENSION SUBCUTANEOUS at 06:10

## 2020-06-11 RX ADMIN — CHLORHEXIDINE GLUCONATE 15 MILLILITER(S): 213 SOLUTION TOPICAL at 17:49

## 2020-06-11 RX ADMIN — ENOXAPARIN SODIUM 30 MILLIGRAM(S): 100 INJECTION SUBCUTANEOUS at 17:49

## 2020-06-11 RX ADMIN — Medication 2: at 17:48

## 2020-06-11 RX ADMIN — HUMAN INSULIN 40 UNIT(S): 100 INJECTION, SUSPENSION SUBCUTANEOUS at 11:50

## 2020-06-11 RX ADMIN — Medication 25 MILLIGRAM(S): at 05:52

## 2020-06-11 RX ADMIN — ENOXAPARIN SODIUM 30 MILLIGRAM(S): 100 INJECTION SUBCUTANEOUS at 06:05

## 2020-06-11 RX ADMIN — HUMAN INSULIN 40 UNIT(S): 100 INJECTION, SUSPENSION SUBCUTANEOUS at 23:14

## 2020-06-11 RX ADMIN — PANTOPRAZOLE SODIUM 40 MILLIGRAM(S): 20 TABLET, DELAYED RELEASE ORAL at 11:50

## 2020-06-11 RX ADMIN — HUMAN INSULIN 40 UNIT(S): 100 INJECTION, SUSPENSION SUBCUTANEOUS at 00:40

## 2020-06-11 RX ADMIN — SIMVASTATIN 20 MILLIGRAM(S): 20 TABLET, FILM COATED ORAL at 21:56

## 2020-06-11 RX ADMIN — CHLORHEXIDINE GLUCONATE 1 APPLICATION(S): 213 SOLUTION TOPICAL at 21:57

## 2020-06-11 RX ADMIN — Medication 25 MILLIGRAM(S): at 17:49

## 2020-06-11 RX ADMIN — LEVETIRACETAM 1000 MILLIGRAM(S): 250 TABLET, FILM COATED ORAL at 19:33

## 2020-06-11 RX ADMIN — Medication 150 MICROGRAM(S): at 05:51

## 2020-06-11 RX ADMIN — LEVETIRACETAM 1000 MILLIGRAM(S): 250 TABLET, FILM COATED ORAL at 05:51

## 2020-06-11 RX ADMIN — HUMAN INSULIN 24 UNIT(S): 100 INJECTION, SUSPENSION SUBCUTANEOUS at 17:48

## 2020-06-11 NOTE — PROGRESS NOTE ADULT - PROBLEM SELECTOR PLAN 2
-Levothyroxine 150mcg daily  -Hold TF 1 hour prior and after levothyroxine given.   -TSH/Free T4 pending for today

## 2020-06-11 NOTE — PROGRESS NOTE ADULT - ASSESSMENT
65 yo female with T2D uncontrolled while inpatient (HbA1c 7.4%) with no known complications and hypothyroidism here with mental status changes associated with shaking found to have a left frontal parenchymal hematoma with mass effect on the left lateral ventricle and intraventricular extension with blood layering in the occipital horns.  Endocrine following for hyperglycemia on chronic steroids. Tolerating TF @ goal. BG values mostly at goal on present insulin regimen. BG goal (100-180mg/dl).

## 2020-06-11 NOTE — PROGRESS NOTE ADULT - PROBLEM SELECTOR PLAN 1
-test BG Q6h  -Adjust NPH as follows  12am: c/w NPH 40 units  6am: c/w NPH 48 units  12pm: Decrease NPH 40 units   6pm: Decrease NPH 24 units  -Hold NPH if TF off. Can reduce dose if BG less than 100mg/dl  -c/w Humalog moderate correction scale Q6h  Please notify endocrine if any changes in steroid regimen or TF regimen so we can adjust insulin regimen accordingly.

## 2020-06-11 NOTE — PROGRESS NOTE ADULT - SUBJECTIVE AND OBJECTIVE BOX
O:     T(C): 36.5 (06-11-20 @ 19:00), Max: 37.1 (06-10-20 @ 20:00)  HR: 101 (06-11-20 @ 19:00) (98 - 120)  BP: --  RR: 15 (06-11-20 @ 19:00) (14 - 31)  SpO2: 100% (06-11-20 @ 19:00) (100% - 100%)  06-10-20 @ 07:01  -  06-11-20 @ 07:00  --------------------------------------------------------  IN: 1575 mL / OUT: 1150 mL / NET: 425 mL    06-11-20 @ 07:01  -  06-11-20 @ 19:29  --------------------------------------------------------  IN: 1060 mL / OUT: 700 mL / NET: 360 mL    acetaminophen    Suspension .. 650 milliGRAM(s) Oral every 6 hours PRN  amLODIPine   Tablet 10 milliGRAM(s) Oral daily  chlorhexidine 0.12% Liquid 15 milliLiter(s) Oral Mucosa every 12 hours  chlorhexidine 4% Liquid 1 Application(s) Topical <User Schedule>  dextrose 40% Gel 15 Gram(s) Oral once PRN  dextrose 5%. 1000 milliLiter(s) IV Continuous <Continuous>  dextrose 50% Injectable 12.5 Gram(s) IV Push once  dextrose 50% Injectable 25 Gram(s) IV Push once  dextrose 50% Injectable 25 Gram(s) IV Push once  doxazosin 2 milliGRAM(s) Oral at bedtime  enoxaparin Injectable 30 milliGRAM(s) SubCutaneous two times a day  ertapenem  IVPB 1000 milliGRAM(s) IV Intermittent <User Schedule>  fentaNYL    Injectable 25 MICROGram(s) IV Push every 2 hours PRN  glucagon  Injectable 1 milliGRAM(s) IntraMuscular once PRN  insulin lispro (HumaLOG) corrective regimen sliding scale   SubCutaneous every 6 hours  insulin NPH human recombinant 48 Unit(s) SubCutaneous <User Schedule>  insulin NPH human recombinant 40 Unit(s) SubCutaneous <User Schedule>  insulin NPH human recombinant 40 Unit(s) SubCutaneous <User Schedule>  insulin NPH human recombinant 24 Unit(s) SubCutaneous <User Schedule>  lacosamide Solution 200 milliGRAM(s) Oral two times a day  levETIRAcetam  Solution 1000 milliGRAM(s) Oral two times a day  levothyroxine 150 MICROGram(s) Oral daily  metoprolol tartrate 25 milliGRAM(s) Oral two times a day  pantoprazole  Injectable 40 milliGRAM(s) IV Push daily  predniSONE   Tablet 15 milliGRAM(s) Oral <User Schedule>  predniSONE   Tablet 5 milliGRAM(s) Oral at bedtime  simvastatin 20 milliGRAM(s) Oral at bedtime  sodium chloride 0.9% lock flush 10 milliLiter(s) IV Push every 1 hour PRN  Mode: AC/ CMV (Assist Control/ Continuous Mandatory Ventilation), RR (machine): 14, TV (machine): 450, FiO2: 50, PEEP: 5, ITime: 0.8, MAP: 12, PIP: 26    PHYSICAL EXAMINATION:   General: No Acute Distress   Neurological: Intubated, pupils 2mm and reactive, +corneal reflexes, +cough/gag, +overbreathing vent set rate, not following commands, left gaze preference, RUE trace flexion movement, LUE trace flexion movement, BLEs TF L>R  Pulmonary: Mildly diminished at bases  Cardiovascular: S1, S2, Regular Rate and Rhythm   Gastrointestinal: Soft, Nontender, Nondistended       LABS:  Na: 144 (06-10 @ 01:40), 143 (06-08 @ 20:43)  K: 4.4 (06-10 @ 01:40), 3.9 (06-08 @ 20:43)  Cl: 105 (06-10 @ 01:40), 109 (06-08 @ 20:43)  CO2: 26 (06-10 @ 01:40), 22 (06-08 @ 20:43)  BUN: 25 (06-10 @ 01:40), 20 (06-08 @ 20:43)  Cr: 0.63 (06-10 @ 01:40), 0.51 (06-08 @ 20:43)  Glu: 137(06-10 @ 01:40), 128(06-08 @ 20:43)    Hgb: 9.3 (06-10 @ 01:40), 9.2 (06-08 @ 20:43)  Hct: 29.9 (06-10 @ 01:40), 30.0 (06-08 @ 20:43)  WBC: 12.14 (06-10 @ 01:40), 11.40 (06-08 @ 20:43)  Plt: 461 (06-10 @ 01:40), 482 (06-08 @ 20:43)    INR:   PTT:   	  ASSESSMENT:  Left frontal intracerebral hemorrhage with mass effect and brain edema , generalized periodic discharges, UTI.   continue Q4 neurochecks, Keppra and Vimpat for GPDs/risk for seizures  respiratroy failure unable to wean, will need trach, Intubated since 5/29- Day 13 of intubation.   History of COPD   ABG and adjsut vent settings accordingly   Keep -160  TTE negative for PFO  keep sodium 140-150 for brain edema   HTN: Continue norvasc and metoprolol    Diet: On Glucerna Tube feeds  Endo Following - adjusting NPH. Scheduled doses by endo.   Continue prednisone 15mg, 5 mg at bedtime (was on it at home for RA)  Goal euglycemia (-180)  On SQL.    30 critical care time O: remains intubated, failed PS     T(C): 36.5 (06-11-20 @ 19:00), Max: 37.1 (06-10-20 @ 20:00)  HR: 101 (06-11-20 @ 19:00) (98 - 120)  BP: --  RR: 15 (06-11-20 @ 19:00) (14 - 31)  SpO2: 100% (06-11-20 @ 19:00) (100% - 100%)  06-10-20 @ 07:01  -  06-11-20 @ 07:00  --------------------------------------------------------  IN: 1575 mL / OUT: 1150 mL / NET: 425 mL    06-11-20 @ 07:01  -  06-11-20 @ 19:29  --------------------------------------------------------  IN: 1060 mL / OUT: 700 mL / NET: 360 mL    acetaminophen    Suspension .. 650 milliGRAM(s) Oral every 6 hours PRN  amLODIPine   Tablet 10 milliGRAM(s) Oral daily  chlorhexidine 0.12% Liquid 15 milliLiter(s) Oral Mucosa every 12 hours  chlorhexidine 4% Liquid 1 Application(s) Topical <User Schedule>  dextrose 40% Gel 15 Gram(s) Oral once PRN  dextrose 5%. 1000 milliLiter(s) IV Continuous <Continuous>  dextrose 50% Injectable 12.5 Gram(s) IV Push once  dextrose 50% Injectable 25 Gram(s) IV Push once  dextrose 50% Injectable 25 Gram(s) IV Push once  doxazosin 2 milliGRAM(s) Oral at bedtime  enoxaparin Injectable 30 milliGRAM(s) SubCutaneous two times a day  ertapenem  IVPB 1000 milliGRAM(s) IV Intermittent <User Schedule>  fentaNYL    Injectable 25 MICROGram(s) IV Push every 2 hours PRN  glucagon  Injectable 1 milliGRAM(s) IntraMuscular once PRN  insulin lispro (HumaLOG) corrective regimen sliding scale   SubCutaneous every 6 hours  insulin NPH human recombinant 48 Unit(s) SubCutaneous <User Schedule>  insulin NPH human recombinant 40 Unit(s) SubCutaneous <User Schedule>  insulin NPH human recombinant 40 Unit(s) SubCutaneous <User Schedule>  insulin NPH human recombinant 24 Unit(s) SubCutaneous <User Schedule>  lacosamide Solution 200 milliGRAM(s) Oral two times a day  levETIRAcetam  Solution 1000 milliGRAM(s) Oral two times a day  levothyroxine 150 MICROGram(s) Oral daily  metoprolol tartrate 25 milliGRAM(s) Oral two times a day  pantoprazole  Injectable 40 milliGRAM(s) IV Push daily  predniSONE   Tablet 15 milliGRAM(s) Oral <User Schedule>  predniSONE   Tablet 5 milliGRAM(s) Oral at bedtime  simvastatin 20 milliGRAM(s) Oral at bedtime  sodium chloride 0.9% lock flush 10 milliLiter(s) IV Push every 1 hour PRN  Mode: AC/ CMV (Assist Control/ Continuous Mandatory Ventilation), RR (machine): 14, TV (machine): 450, FiO2: 50, PEEP: 5, ITime: 0.8, MAP: 12, PIP: 26    PHYSICAL EXAMINATION:   General: No Acute Distress   Neurological: Intubated, pupils 2mm and reactive, +corneal reflexes, +cough/gag, +overbreathing vent set rate, not following commands, left gaze preference, RUE trace flexion movement, LUE trace flexion movement, BLEs TF L>R  Pulmonary: Mildly diminished at bases  Cardiovascular: S1, S2, Regular Rate and Rhythm   Gastrointestinal: Soft, Nontender, Nondistended       LABS:  Na: 144 (06-10 @ 01:40), 143 (06-08 @ 20:43)  K: 4.4 (06-10 @ 01:40), 3.9 (06-08 @ 20:43)  Cl: 105 (06-10 @ 01:40), 109 (06-08 @ 20:43)  CO2: 26 (06-10 @ 01:40), 22 (06-08 @ 20:43)  BUN: 25 (06-10 @ 01:40), 20 (06-08 @ 20:43)  Cr: 0.63 (06-10 @ 01:40), 0.51 (06-08 @ 20:43)  Glu: 137(06-10 @ 01:40), 128(06-08 @ 20:43)    Hgb: 9.3 (06-10 @ 01:40), 9.2 (06-08 @ 20:43)  Hct: 29.9 (06-10 @ 01:40), 30.0 (06-08 @ 20:43)  WBC: 12.14 (06-10 @ 01:40), 11.40 (06-08 @ 20:43)  Plt: 461 (06-10 @ 01:40), 482 (06-08 @ 20:43)    INR:   PTT:   	  ASSESSMENT:  Left frontal intracerebral hemorrhage with mass effect and brain edema , generalized periodic discharges, UTI.   continue Q4 neurochecks, Keppra and Vimpat for GPDs/risk for seizures  respiratroy failure unable to wean, will need trach, Intubated since 5/29- Day 13 of intubation.   History of COPD   ABG and adjsut vent settings accordingly   Keep -160  TTE negative for PFO  keep sodium 140-150 for brain edema   HTN: Continue norvasc and metoprolol    Diet: On Glucerna Tube feeds  Endo Following - adjusting NPH. Scheduled doses by endo.   Continue prednisone 15mg, 5 mg at bedtime (was on it at home for RA)  Goal euglycemia (-180)  On SQL.-Adjust NPH as follows  12am: c/w NPH 40 units  6am: c/w NPH 48 units  12pm: Decrease NPH 40 units   6pm: Decrease NPH 24 units    30 critical care time

## 2020-06-11 NOTE — PROGRESS NOTE ADULT - ASSESSMENT
ASSESSMENT:  Left frontal intracerebral hemorrhage, generalized periodic discharges, UTI     NEURO:  Q4 neurochecks  Cont Keppra and Vimpat for GPDs/risk for seizures  Pain management with Tylenol.   Given previous psych history and workup not revealing any cause, NMDA encephalitis testing sent -> NMDA AB neg.    Trach and PEG being discussed with family.   Activity: [] OOB as tolerated [X] Bedrest [X] PT [X] OT [] PMNR      PULM:  Intubated since 5/29  History of COPD   CPAP as tolerated. Will need trach if family desires.  Mild hypoxemia- likely atelectasis. Monitor.    CV:  Keep -160  TTE negative for PFO  HTN: Continue norvasc and metoprolol    Statin for HLD   Tachycardia- possibly related to agitation, CPAP   DC Zahra      RENAL:  Na wnl  IVL   Start low dose cardura re: urine retention    GI:  Diet: On Glucerna Tube feeds  Last BM 6/7  GI prophylaxis [] not indicated [X] PPI [] other:      ENDO:   Endo Following - adjusting NPH.   Better control  Continue synthroid. Change to PO  Continue prednisone 15mg, 5 mg at bedtime (was on it at home for RA)  Goal euglycemia (-180)      HEME/ONC:  On SQL.  VTE prophylaxis: [X] SCDs [X] chemoprophylaxis [] high risk of DVT/PE on admission due to:  LE doppler 5/30- No DVT  Anemia- improved     ID:  Afebrile   Ertapnem for ESBL      MISC: Palliative care consulted. Family meeting 6/10 with daughter Heavenly, son (Norris), SW, Ethics and palliative care.       SOCIAL/FAMILY:  [X] updated family via phone  [] family meeting    CODE STATUS:  [X] Full Code [] DNR [] DNI [] Palliative/Comfort Care    DISPOSITION:  [X] ICU [] Stroke Unit [] Floor [] EMU [] RCU [] PCU    [X] Patient is at high risk of neurologic deterioration/death due to: resp failure, seizures, hemorrhage.     Time spent:  45 critical care minutes ASSESSMENT:  Left frontal intracerebral hemorrhage, generalized periodic discharges, UTI.     NEURO:  Q4 neurochecks  Cont Keppra and Vimpat for GPDs/risk for seizures  Pain management with Tylenol.   Given previous psych history and workup not revealing any cause, NMDA encephalitis testing sent -> NMDA AB neg.    Trach and PEG being discussed with family.   Activity: [] OOB as tolerated [] Bedrest [X] PT [X] OT [] PMNR      PULM:  Intubated since 5/29- Day 13 of intubation.   History of COPD   CPAP as tolerated. Will need trach if family desires.  Mild hypoxemia- likely atelectasis. Monitor.      CV:  Keep -160  TTE negative for PFO  HTN: Continue norvasc and metoprolol    Statin for HLD    D/C [place]      RENAL:  Na wnl  IVL.   Female iraheta. Bladder scan.  Start low dose cardura re: urine retention    GI:  Diet: On Glucerna Tube feeds  Last BM 6/11. Banana flakes  GI prophylaxis [] not indicated [X] PPI [] other:      ENDO:   Endo Following - adjusting NPH. Scheduled doses by endo.   Better control  Continue synthroid. Change to PO  Continue prednisone 15mg, 5 mg at bedtime (was on it at home for RA)  Goal euglycemia (-180)      HEME/ONC:  On SQL.  VTE prophylaxis: [X] SCDs [X] chemoprophylaxis [] high risk of DVT/PE on admission due to:  LE doppler 5/30- No DVT  Anemia- improved.    ID:  Afebrile   Ertapnem for ESBL x 7 days      MISC: Palliative care consulted. Family meeting 6/10 with daughter Heavenly, son (Norris), , Ethics and palliative care.   Son Norris would like to reconvene on 6/12 with 7 other siblings      SOCIAL/FAMILY:  [X] updated family via phone  [] family meeting    CODE STATUS:  [X] Full Code [] DNR [] DNI [] Palliative/Comfort Care    DISPOSITION:  [X] ICU [] Stroke Unit [] Floor [] EMU [] RCU [] PCU    [X] Patient is at high risk of neurologic deterioration/death due to: resp failure, seizures, hemorrhage.     Time spent:  45 critical care minutes

## 2020-06-11 NOTE — PROGRESS NOTE ADULT - PROBLEM SELECTOR PLAN 3
-c/w Atorvastatin    discussed plan w/RN  pager: [phone]   cell: [phone]  office: [phone]    -I spent a total time of 25 mins with the patient at bedside/on unit of which more than 50% of time was spent on counseling/coordination of care.

## 2020-06-11 NOTE — PROGRESS NOTE ADULT - SUBJECTIVE AND OBJECTIVE BOX
SUMMARY:    64 year-old woman with history of hypothyroidism, HTN, IDDM, COPD/CHRIS on home O2, RA (on Prednisone), SBO s/p ex lap with lysis of adhesions c/b evisceration,  recent hospitalization in 3/2020 with encephalopathy, significantly hypothyroid (, without myxedema coma), small (stable) right sided SDH, UTI, catatonic reaction to VPA (now resolved) and DKA and psychiatric issues, 5/2020 hospitalization for GIB, Emphysematous cystitis, endometritis, Sigmoid colitis, morbid obesity and functional quadriplegia admitted 5/29/20 for left frontal intracerebral hemorrhage with intraventricular extension. Her ICU course has been notable for GPDs on EEG treated with levetiracetam and lacosamide, uncontrolled diabetes mellitus II treated with insulin drip and then transitioned to NPH and ESBL UTI.   HCP: Norris Rush, Son [phone]; Daughter, Heavenly Schmidt [phone]; son asks that only him or his sister, Heavenly be contacted for patient updates/information as they are both proxys for patient.    HOSP COURSE:   5/29: DDAVP  6/9: Found to have LUE spasmodic movements. Aborted after holding LUE.   6/9: [facility] meeting with palliative care, ethics.     OVERNIGHT EVENTS: Afebrile    ICU Vital Signs Last 24 Hrs  T(C): 36.7 (11 Jun 2020 04:00), Max: 37.1 (10 Nile 2020 20:00)  T(F): 98.1 (11 Jun 2020 04:00), Max: 98.8 (10 Nile 2020 20:00)  HR: 104 (11 Jun 2020 04:15) (99 - 119)  BP: --  BP(mean): --  ABP: 119/72 (11 Jun 2020 04:00) (109/66 - 133/82)  ABP(mean): 90 (11 Jun 2020 04:00) (82 - 102)  RR: 22 (11 Jun 2020 04:00) (14 - 31)  SpO2: 100% (11 Jun 2020 04:15) (100% - 100%)      Mode: AC/ CMV (Assist Control/ Continuous Mandatory Ventilation), RR (machine): 14, TV (machine): 450, FiO2: 50, PEEP: 5, ITime: 1, MAP: 11, PIP: 25    MEDICATIONS  (STANDING):  amLODIPine   Tablet 10 milliGRAM(s) Oral daily  chlorhexidine 0.12% Liquid 15 milliLiter(s) Oral Mucosa every 12 hours  chlorhexidine 4% Liquid 1 Application(s) Topical <User Schedule>  dextrose 5%. 1000 milliLiter(s) (50 mL/Hr) IV Continuous <Continuous>  dextrose 50% Injectable 12.5 Gram(s) IV Push once  dextrose 50% Injectable 25 Gram(s) IV Push once  dextrose 50% Injectable 25 Gram(s) IV Push once  doxazosin 2 milliGRAM(s) Oral at bedtime  enoxaparin Injectable 30 milliGRAM(s) SubCutaneous two times a day  ertapenem  IVPB 1000 milliGRAM(s) IV Intermittent <User Schedule>  insulin lispro (HumaLOG) corrective regimen sliding scale   SubCutaneous every 6 hours  insulin NPH human recombinant 48 Unit(s) SubCutaneous <User Schedule>  insulin NPH human recombinant 44 Unit(s) SubCutaneous <User Schedule>  insulin NPH human recombinant 28 Unit(s) SubCutaneous <User Schedule>  insulin NPH human recombinant 40 Unit(s) SubCutaneous <User Schedule>  lacosamide Solution 200 milliGRAM(s) Oral two times a day  levETIRAcetam  Solution 1000 milliGRAM(s) Oral two times a day  levothyroxine 150 MICROGram(s) Oral daily  metoprolol tartrate 25 milliGRAM(s) Oral two times a day  pantoprazole  Injectable 40 milliGRAM(s) IV Push daily  predniSONE   Tablet 15 milliGRAM(s) Oral <User Schedule>  predniSONE   Tablet 5 milliGRAM(s) Oral at bedtime  simvastatin 20 milliGRAM(s) Oral at bedtime    MEDICATIONS  (PRN):  acetaminophen    Suspension .. 650 milliGRAM(s) Oral every 6 hours PRN Temp greater or equal to 38C (100.4F), Mild Pain (1 - 3)  dextrose 40% Gel 15 Gram(s) Oral once PRN Blood Glucose LESS THAN 70 milliGRAM(s)/deciliter  fentaNYL    Injectable 25 MICROGram(s) IV Push every 2 hours PRN Severe Pain (7 - 10)  glucagon  Injectable 1 milliGRAM(s) IntraMuscular once PRN Glucose LESS THAN 70 milligrams/deciliter  sodium chloride 0.9% lock flush 10 milliLiter(s) IV Push every 1 hour PRN Pre/post blood products, medications, blood draw, and to maintain line patency                          9.3    12.14 )-----------( 461      ( 10 Nile 2020 01:40 )             29.9   06-10    144  |  105  |  25<H>  ----------------------------<  137<H>  4.4   |  26  |  0.63    Ca    9.9      10 Nile 2020 01:40  Phos  3.9     06-10  Mg     2.1     06-10        ABG - ( 10 Nile 2020 06:45 )  pH, Arterial: 7.42  pH, Blood: x     /  pCO2: 40    /  pO2: 192   / HCO3: 26    / Base Excess: 1.7   /  SaO2: 100                     ABG - ( 08 Jun 2020 01:52 )  pH, Arterial: 7.45  pH, Blood: x     /  pCO2: 36    /  pO2: 121   / HCO3: 25    / Base Excess: 1.5   /  SaO2: 99        POCT Blood Glucose.: 210 mg/dL (08 Jun 2020 05:07)  POCT Blood Glucose.: 199 mg/dL (07 Jun 2020 23:37)  POCT Blood Glucose.: 231 mg/dL (07 Jun 2020 17:01)  POCT Blood Glucose.: 231 mg/dL (07 Jun 2020 11:35)      CULTURES:  Culture Results:   >100,000 CFU/ml Escherichia coli ESBL (06-04 @ 01:09)  Culture Results:   No growth to date. (06-03 @ 15:00)      PHYSICAL EXAMINATION:   General: No Acute Distress   Neurological: Intubated, pupils 2mm and reactive, +corneal reflexes, +cough/gag, +overbreathing vent set rate, not following commands, left gaze preference, RUE trace flexion movement, LUE trace flexion movement, BLEs TF L>R  Pulmonary: Mildly diminished at bases  Cardiovascular: S1, S2, Regular Rate and Rhythm   Gastrointestinal: Soft, Nontender, Nondistended SUMMARY:    64 year-old woman with history of hypothyroidism, HTN, IDDM, COPD/CHRIS on home O2, RA (on Prednisone), SBO s/p ex lap with lysis of adhesions c/b evisceration,  recent hospitalization in 3/2020 with encephalopathy, significantly hypothyroid (, without myxedema coma), small (stable) right sided SDH, UTI, catatonic reaction to VPA (now resolved) and DKA and psychiatric issues, 5/2020 hospitalization for GIB, Emphysematous cystitis, endometritis, Sigmoid colitis, morbid obesity and functional quadriplegia admitted 5/29/20 for left frontal intracerebral hemorrhage with intraventricular extension. Her ICU course has been notable for GPDs on EEG treated with levetiracetam and lacosamide, uncontrolled diabetes mellitus II treated with insulin drip and then transitioned to NPH and ESBL UTI.   HCP: Norris Rush, Son [phone]; Daughter, Heavenly Schmidt [phone]; son asks that only him or his sister, Heavenly be contacted for patient updates/information as they are both proxys for patient.    HOSP COURSE:   5/29: DDAVP  6/9: Found to have LUE spasmodic movements. Aborted after holding LUE.   6/9: [facility] meeting with palliative care, ethics.     OVERNIGHT EVENTS: Afebrile      ICU Vital Signs Last 24 Hrs  T(C): 36.7 (11 Jun 2020 04:00), Max: 37.1 (10 Nile 2020 20:00)  T(F): 98.1 (11 Jun 2020 04:00), Max: 98.8 (10 Nile 2020 20:00)  HR: 104 (11 Jun 2020 04:15) (99 - 119)--  ABP: 119/72 (11 Jun 2020 04:00) (109/66 - 133/82)  ABP(mean): 90 (11 Jun 2020 04:00) (82 - 102)  RR: 22 (11 Jun 2020 04:00) (14 - 31)  SpO2: 100% (11 Jun 2020 04:15) (100% - 100%)      Mode: AC/ CMV (Assist Control/ Continuous Mandatory Ventilation), RR (machine): 14, TV (machine): 450, FiO2: 50, PEEP: 5, ITime: 1, MAP: 11, PIP: 25    MEDICATIONS  (STANDING):  amLODIPine   Tablet 10 milliGRAM(s) Oral daily  chlorhexidine 0.12% Liquid 15 milliLiter(s) Oral Mucosa every 12 hours  chlorhexidine 4% Liquid 1 Application(s) Topical <User Schedule>  dextrose 5%. 1000 milliLiter(s) (50 mL/Hr) IV Continuous <Continuous>  dextrose 50% Injectable 12.5 Gram(s) IV Push once  dextrose 50% Injectable 25 Gram(s) IV Push once  dextrose 50% Injectable 25 Gram(s) IV Push once  doxazosin 2 milliGRAM(s) Oral at bedtime  enoxaparin Injectable 30 milliGRAM(s) SubCutaneous two times a day  ertapenem  IVPB 1000 milliGRAM(s) IV Intermittent <User Schedule>  insulin lispro (HumaLOG) corrective regimen sliding scale   SubCutaneous every 6 hours  insulin NPH human recombinant 48 Unit(s) SubCutaneous <User Schedule>  insulin NPH human recombinant 44 Unit(s) SubCutaneous <User Schedule>  insulin NPH human recombinant 28 Unit(s) SubCutaneous <User Schedule>  insulin NPH human recombinant 40 Unit(s) SubCutaneous <User Schedule>  lacosamide Solution 200 milliGRAM(s) Oral two times a day  levETIRAcetam  Solution 1000 milliGRAM(s) Oral two times a day  levothyroxine 150 MICROGram(s) Oral daily  metoprolol tartrate 25 milliGRAM(s) Oral two times a day  pantoprazole  Injectable 40 milliGRAM(s) IV Push daily  predniSONE   Tablet 15 milliGRAM(s) Oral <User Schedule>  predniSONE   Tablet 5 milliGRAM(s) Oral at bedtime  simvastatin 20 milliGRAM(s) Oral at bedtime    MEDICATIONS  (PRN):  acetaminophen    Suspension .. 650 milliGRAM(s) Oral every 6 hours PRN Temp greater or equal to 38C (100.4F), Mild Pain (1 - 3)  dextrose 40% Gel 15 Gram(s) Oral once PRN Blood Glucose LESS THAN 70 milliGRAM(s)/deciliter  fentaNYL    Injectable 25 MICROGram(s) IV Push every 2 hours PRN Severe Pain (7 - 10)  glucagon  Injectable 1 milliGRAM(s) IntraMuscular once PRN Glucose LESS THAN 70 milligrams/deciliter  sodium chloride 0.9% lock flush 10 milliLiter(s) IV Push every 1 hour PRN Pre/post blood products, medications, blood draw, and to maintain line patency                          9.3    12.14 )-----------( 461      ( 10 Nile 2020 01:40 )             29.9   06-10    144  |  105  |  25<H>  ----------------------------<  137<H>  4.4   |  26  |  0.63    Ca    9.9      10 Nile 2020 01:40  Phos  3.9     06-10  Mg     2.1     06-10        ABG - ( 10 Nile 2020 06:45 )  pH, Arterial: 7.42  pH, Blood: x     /  pCO2: 40    /  pO2: 192   / HCO3: 26    / Base Excess: 1.7   /  SaO2: 100                     ABG - ( 08 Jun 2020 01:52 )  pH, Arterial: 7.45  pH, Blood: x     /  pCO2: 36    /  pO2: 121   / HCO3: 25    / Base Excess: 1.5   /  SaO2: 99        POCT Blood Glucose.: 210 mg/dL (08 Jun 2020 05:07)  POCT Blood Glucose.: 199 mg/dL (07 Jun 2020 23:37)  POCT Blood Glucose.: 231 mg/dL (07 Jun 2020 17:01)  POCT Blood Glucose.: 231 mg/dL (07 Jun 2020 11:35)      CULTURES:  Culture Results:   >100,000 CFU/ml Escherichia coli ESBL (06-04 @ 01:09)  Culture Results:   No growth to date. (06-03 @ 15:00)      PHYSICAL EXAMINATION:   General: No Acute Distress   Neurological: Intubated, pupils 2mm and reactive, +corneal reflexes, +cough/gag, +overbreathing vent set rate, not following commands, left gaze preference, RUE trace flexion movement, LUE trace flexion movement, BLEs TF L>R  Pulmonary: Mildly diminished at bases  Cardiovascular: S1, S2, Regular Rate and Rhythm   Gastrointestinal: Soft, Nontender, Nondistended SUMMARY:    64 year-old woman with history of hypothyroidism, HTN, IDDM, COPD/CHRIS on home O2, RA (on Prednisone), SBO s/p ex lap with lysis of adhesions c/b evisceration,  recent hospitalization in 3/2020 with encephalopathy, significantly hypothyroid (, without myxedema coma), small (stable) right sided SDH, UTI, catatonic reaction to VPA (now resolved) and DKA and psychiatric issues, 5/2020 hospitalization for GIB, Emphysematous cystitis, endometritis, Sigmoid colitis, morbid obesity and functional quadriplegia admitted 5/29/20 for left frontal intracerebral hemorrhage with intraventricular extension. Her ICU course has been notable for GPDs on EEG treated with levetiracetam and lacosamide, uncontrolled diabetes mellitus II treated with insulin drip and then transitioned to NPH and ESBL UTI.   HCP: Norris Rush, Son [phone]; Daughter, Heavenly Schmidt [phone]; son asks that only him or his sister, Heavenly be contacted for patient updates/information as they are both proxys for patient.    HOSP COURSE:   5/29: DDAVP  6/9: Found to have LUE spasmodic movements. Aborted after holding LUE.   6/9: [facility] meeting with palliative care, ethics.     OVERNIGHT EVENTS: Afebrile      ICU Vital Signs Last 24 Hrs  T(C): 36.7 (11 Jun 2020 04:00), Max: 37.1 (10 Nile 2020 20:00)  T(F): 98.1 (11 Jun 2020 04:00), Max: 98.8 (10 Nile 2020 20:00)  HR: 104 (11 Jun 2020 04:15) (99 - 119)--  ABP: 119/72 (11 Jun 2020 04:00) (109/66 - 133/82)  ABP(mean): 90 (11 Jun 2020 04:00) (82 - 102)  RR: 22 (11 Jun 2020 04:00) (14 - 31)  SpO2: 100% (11 Jun 2020 04:15) (100% - 100%)      Mode: AC/ CMV (Assist Control/ Continuous Mandatory Ventilation), RR (machine): 14, TV (machine): 450, FiO2: 50, PEEP: 5, ITime: 1, MAP: 11, PIP: 25    MEDICATIONS  (STANDING):  amLODIPine   Tablet 10 milliGRAM(s) Oral daily  chlorhexidine 0.12% Liquid 15 milliLiter(s) Oral Mucosa every 12 hours  chlorhexidine 4% Liquid 1 Application(s) Topical <User Schedule>  dextrose 5%. 1000 milliLiter(s) (50 mL/Hr) IV Continuous <Continuous>  dextrose 50% Injectable 12.5 Gram(s) IV Push once  dextrose 50% Injectable 25 Gram(s) IV Push once  dextrose 50% Injectable 25 Gram(s) IV Push once  doxazosin 2 milliGRAM(s) Oral at bedtime  enoxaparin Injectable 30 milliGRAM(s) SubCutaneous two times a day  ertapenem  IVPB 1000 milliGRAM(s) IV Intermittent <User Schedule>  insulin lispro (HumaLOG) corrective regimen sliding scale   SubCutaneous every 6 hours  insulin NPH human recombinant 48 Unit(s) SubCutaneous <User Schedule>  insulin NPH human recombinant 44 Unit(s) SubCutaneous <User Schedule>  insulin NPH human recombinant 28 Unit(s) SubCutaneous <User Schedule>  insulin NPH human recombinant 40 Unit(s) SubCutaneous <User Schedule>  lacosamide Solution 200 milliGRAM(s) Oral two times a day  levETIRAcetam  Solution 1000 milliGRAM(s) Oral two times a day  levothyroxine 150 MICROGram(s) Oral daily  metoprolol tartrate 25 milliGRAM(s) Oral two times a day  pantoprazole  Injectable 40 milliGRAM(s) IV Push daily  predniSONE   Tablet 15 milliGRAM(s) Oral <User Schedule>  predniSONE   Tablet 5 milliGRAM(s) Oral at bedtime  simvastatin 20 milliGRAM(s) Oral at bedtime    MEDICATIONS  (PRN):  acetaminophen    Suspension .. 650 milliGRAM(s) Oral every 6 hours PRN Temp greater or equal to 38C (100.4F), Mild Pain (1 - 3)  dextrose 40% Gel 15 Gram(s) Oral once PRN Blood Glucose LESS THAN 70 milliGRAM(s)/deciliter  fentaNYL    Injectable 25 MICROGram(s) IV Push every 2 hours PRN Severe Pain (7 - 10)  glucagon  Injectable 1 milliGRAM(s) IntraMuscular once PRN Glucose LESS THAN 70 milligrams/deciliter  sodium chloride 0.9% lock flush 10 milliLiter(s) IV Push every 1 hour PRN Pre/post blood products, medications, blood draw, and to maintain line patency                          9.3    12.14 )-----------( 461      ( 10 Nile 2020 01:40 )             29.9   06-10    144  |  105  |  25<H>  ----------------------------<  137<H>  4.4   |  26  |  0.63    Ca    9.9      10 Nile 2020 01:40  Phos  3.9     06-10  Mg     2.1     06-10        ABG - ( 10 Nile 2020 06:45 )  pH, Arterial: 7.42  pH, Blood: x     /  pCO2: 40    /  pO2: 192   / HCO3: 26    / Base Excess: 1.7   /  SaO2: 100           ABG - ( 08 Jun 2020 01:52 )  pH, Arterial: 7.45  pH, Blood: x     /  pCO2: 36    /  pO2: 121   / HCO3: 25    / Base Excess: 1.5   /  SaO2: 99        POCT Blood Glucose.: 210 mg/dL (08 Jun 2020 05:07)  POCT Blood Glucose.: 199 mg/dL (07 Jun 2020 23:37)  POCT Blood Glucose.: 231 mg/dL (07 Jun 2020 17:01)  POCT Blood Glucose.: 231 mg/dL (07 Jun 2020 11:35)      CULTURES:  Culture Results:   >100,000 CFU/ml Escherichia coli ESBL (06-04 @ 01:09)  Culture Results:   No growth to date. (06-03 @ 15:00)      PHYSICAL EXAMINATION:   General: No Acute Distress   Neurological: Intubated, pupils 2mm and reactive, +corneal reflexes, +cough/gag, +overbreathing vent set rate, not following commands, left gaze preference, RUE trace flexion movement, LUE trace flexion movement, BLEs TF L>R  Pulmonary: Mildly diminished at bases  Cardiovascular: S1, S2, Regular Rate and Rhythm   Gastrointestinal: Soft, Nontender, Nondistended

## 2020-06-11 NOTE — PROGRESS NOTE ADULT - SUBJECTIVE AND OBJECTIVE BOX
Diabetes Follow up note:    Chief complaint: T2DM/steroid induced hyperglycemia.     Interval Hx: BG values tightly controlled on present insulin regimen. Pt seen at bedside. Remains on Glucerna @ 65 cc/hr. Remains w/poor neuro status and unable to wean from vent. Palliative/ethics working with family on GOC discussions.     Review of Systems:  unable to provide ROS.   MEDS:    insulin lispro (HumaLOG) corrective regimen sliding scale   SubCutaneous every 6 hours  insulin NPH human recombinant 48 Unit(s) SubCutaneous <User Schedule>  insulin NPH human recombinant 44 Unit(s) SubCutaneous <User Schedule>  insulin NPH human recombinant 28 Unit(s) SubCutaneous <User Schedule>  insulin NPH human recombinant 40 Unit(s) SubCutaneous <User Schedule>  levothyroxine 150 MICROGram(s) Oral daily  predniSONE   Tablet 15 milliGRAM(s) Oral <User Schedule>  predniSONE   Tablet 5 milliGRAM(s) Oral at bedtime  simvastatin 20 milliGRAM(s) Oral at bedtime    ertapenem  IVPB 1000 milliGRAM(s) IV Intermittent <User Schedule>    Allergies    Depakote (Other)  Seroquel (Other (Severe))          PE:  General: Female lying in bed. Intubated  Vital Signs Last 24 Hrs  T(C): 36.4 (11 Jun 2020 08:00), Max: 37.1 (10 Nile 2020 20:00)  T(F): 97.5 (11 Jun 2020 08:00), Max: 98.8 (10 Nile 2020 20:00)  HR: 99 (11 Jun 2020 09:00) (98 - 119)  BP: --  BP(mean): --  RR: 16 (11 Jun 2020 09:00) (14 - 31)  SpO2: 100% (11 Jun 2020 09:00) (100% - 100%)  HEENT: ETT in place. On mechanical ventilation  Abd: Soft, NT,ND, Obese.   Extremities: Warm  Neuro: unresponsive.     LABS:  POCT Blood Glucose.: 111 mg/dL (06-11-20 @ 06:09)  POCT Blood Glucose.: 110 mg/dL (06-11-20 @ 00:51)  POCT Blood Glucose.: 82 mg/dL (06-11-20 @ 00:47)  POCT Blood Glucose.: 121 mg/dL (06-10-20 @ 17:25)  POCT Blood Glucose.: 226 mg/dL (06-10-20 @ 11:57)  POCT Blood Glucose.: 171 mg/dL (06-10-20 @ 06:35)  POCT Blood Glucose.: 120 mg/dL (06-09-20 @ 23:45)  POCT Blood Glucose.: 160 mg/dL (06-09-20 @ 17:55)  POCT Blood Glucose.: 175 mg/dL (06-09-20 @ 11:28)  POCT Blood Glucose.: 151 mg/dL (06-09-20 @ 05:13)  POCT Blood Glucose.: 111 mg/dL (06-08-20 @ 23:47)  POCT Blood Glucose.: 167 mg/dL (06-08-20 @ 18:23)  POCT Blood Glucose.: 295 mg/dL (06-08-20 @ 12:48)                            9.3    12.14 )-----------( 461      ( 10 Nile 2020 01:40 )             29.9       06-10    144  |  105  |  25<H>  ----------------------------<  137<H>  4.4   |  26  |  0.63    Ca    9.9      10 Nile 2020 01:40  Phos  3.9     06-10  Mg     2.1     06-10        Thyroid Function Tests:  06-04 @ 01:58 TSH -- FreeT4 2.7 T3 -- Anti TPO -- Anti Thyroglobulin Ab -- TSI --  05-30 @ 04:44 TSH 12.30 FreeT4 -- T3 51 Anti TPO -- Anti Thyroglobulin Ab -- TSI --      A1C with Estimated Average Glucose Result: 7.4 % (05-30-20 @ 04:43)  A1C with Estimated Average Glucose Result: 6.4 % (05-14-20 @ 16:23)  A1C with Estimated Average Glucose Result: 6.5 % (05-13-20 @ 08:24)  Hemoglobin A1C, Whole Blood: 9.0 % (01-28-20 @ 19:30)  Hemoglobin A1C, Whole Blood: 9.2 % (01-27-20 @ 17:36)          Contact number: beedianne [phone] or [phone]

## 2020-06-12 LAB
GLUCOSE BLDC GLUCOMTR-MCNC: 113 MG/DL — HIGH (ref 70–99)
GLUCOSE BLDC GLUCOMTR-MCNC: 134 MG/DL — HIGH (ref 70–99)
GLUCOSE BLDC GLUCOMTR-MCNC: 140 MG/DL — HIGH (ref 70–99)
GLUCOSE BLDC GLUCOMTR-MCNC: 147 MG/DL — HIGH (ref 70–99)

## 2020-06-12 PROCEDURE — 99291 CRITICAL CARE FIRST HOUR: CPT

## 2020-06-12 PROCEDURE — 71045 X-RAY EXAM CHEST 1 VIEW: CPT | Mod: 26

## 2020-06-12 PROCEDURE — 99292 CRITICAL CARE ADDL 30 MIN: CPT

## 2020-06-12 PROCEDURE — 99231 SBSQ HOSP IP/OBS SF/LOW 25: CPT

## 2020-06-12 RX ORDER — HUMAN INSULIN 100 [IU]/ML
22 INJECTION, SUSPENSION SUBCUTANEOUS
Refills: 0 | Status: DISCONTINUED | OUTPATIENT
Start: 2020-06-12 | End: 2020-06-14

## 2020-06-12 RX ADMIN — Medication 25 MILLIGRAM(S): at 10:04

## 2020-06-12 RX ADMIN — ENOXAPARIN SODIUM 30 MILLIGRAM(S): 100 INJECTION SUBCUTANEOUS at 17:00

## 2020-06-12 RX ADMIN — Medication 15 MILLIGRAM(S): at 05:24

## 2020-06-12 RX ADMIN — LACOSAMIDE 200 MILLIGRAM(S): 50 TABLET ORAL at 05:25

## 2020-06-12 RX ADMIN — ENOXAPARIN SODIUM 30 MILLIGRAM(S): 100 INJECTION SUBCUTANEOUS at 05:24

## 2020-06-12 RX ADMIN — HUMAN INSULIN 22 UNIT(S): 100 INJECTION, SUSPENSION SUBCUTANEOUS at 17:31

## 2020-06-12 RX ADMIN — HUMAN INSULIN 40 UNIT(S): 100 INJECTION, SUSPENSION SUBCUTANEOUS at 12:02

## 2020-06-12 RX ADMIN — LEVETIRACETAM 1000 MILLIGRAM(S): 250 TABLET, FILM COATED ORAL at 17:00

## 2020-06-12 RX ADMIN — LACOSAMIDE 200 MILLIGRAM(S): 50 TABLET ORAL at 17:00

## 2020-06-12 RX ADMIN — PANTOPRAZOLE SODIUM 40 MILLIGRAM(S): 20 TABLET, DELAYED RELEASE ORAL at 12:02

## 2020-06-12 RX ADMIN — Medication 5 MILLIGRAM(S): at 21:42

## 2020-06-12 RX ADMIN — LEVETIRACETAM 1000 MILLIGRAM(S): 250 TABLET, FILM COATED ORAL at 05:24

## 2020-06-12 RX ADMIN — CHLORHEXIDINE GLUCONATE 15 MILLILITER(S): 213 SOLUTION TOPICAL at 17:00

## 2020-06-12 RX ADMIN — HUMAN INSULIN 40 UNIT(S): 100 INJECTION, SUSPENSION SUBCUTANEOUS at 23:42

## 2020-06-12 RX ADMIN — CHLORHEXIDINE GLUCONATE 1 APPLICATION(S): 213 SOLUTION TOPICAL at 21:43

## 2020-06-12 RX ADMIN — Medication 2 MILLIGRAM(S): at 21:42

## 2020-06-12 RX ADMIN — Medication 25 MILLIGRAM(S): at 18:31

## 2020-06-12 RX ADMIN — SIMVASTATIN 20 MILLIGRAM(S): 20 TABLET, FILM COATED ORAL at 21:42

## 2020-06-12 RX ADMIN — CHLORHEXIDINE GLUCONATE 15 MILLILITER(S): 213 SOLUTION TOPICAL at 05:24

## 2020-06-12 RX ADMIN — ERTAPENEM SODIUM 120 MILLIGRAM(S): 1 INJECTION, POWDER, LYOPHILIZED, FOR SOLUTION INTRAMUSCULAR; INTRAVENOUS at 14:20

## 2020-06-12 RX ADMIN — Medication 150 MICROGRAM(S): at 06:01

## 2020-06-12 RX ADMIN — HUMAN INSULIN 48 UNIT(S): 100 INJECTION, SUSPENSION SUBCUTANEOUS at 05:25

## 2020-06-12 NOTE — PROGRESS NOTE ADULT - SUBJECTIVE AND OBJECTIVE BOX
SUMMARY:    64 year-old woman with history of hypothyroidism, HTN, IDDM, COPD/CHRIS on home O2, RA (on Prednisone), SBO s/p ex lap with lysis of adhesions c/b evisceration,  recent hospitalization in 3/2020 with encephalopathy, significantly hypothyroid (, without myxedema coma), small (stable) right sided SDH, UTI, catatonic reaction to VPA (now resolved) and DKA and psychiatric issues, 5/2020 hospitalization for GIB, Emphysematous cystitis, endometritis, Sigmoid colitis, morbid obesity and functional quadriplegia admitted 5/29/20 for left frontal intracerebral hemorrhage with intraventricular extension. Her ICU course has been notable for GPDs on EEG treated with levetiracetam and lacosamide, uncontrolled diabetes mellitus II treated with insulin drip and then transitioned to NPH and ESBL UTI.   HCP: Norris Rush, Son [phone]; Daughter, Heavenly Schmidt [phone]; son asks that only him or his sister, Heavenly be contacted for patient updates/information as they are both proxys for patient.    HOSP COURSE:   5/29: DDAVP  6/9: Found to have LUE spasmodic movements. Aborted after holding LUE.   6/9: [facility] meeting with palliative care, ethics.     OVERNIGHT EVENTS: Afebrile    ICU Vital Signs Last 24 Hrs  T(C): 36.4 (12 Jun 2020 03:00), Max: 36.7 (11 Jun 2020 16:00)  T(F): 97.5 (12 Jun 2020 03:00), Max: 98.1 (11 Jun 2020 16:00)  HR: 103 (12 Jun 2020 06:00) (98 - 120)  BP: --  BP(mean): --  ABP: 97/71 (12 Jun 2020 06:00) (97/71 - 139/83)  ABP(mean): 85 (12 Jun 2020 06:00) (85 - 105)  RR: 14 (12 Jun 2020 06:00) (14 - 29)  SpO2: 100% (12 Jun 2020 06:00) (100% - 100%)      Mode: AC/ CMV (Assist Control/ Continuous Mandatory Ventilation), RR (machine): 14, TV (machine): 450, FiO2: 50, PEEP: 5, ITime: 1, MAP: 11, PIP: 25    MEDICATIONS  (STANDING):  amLODIPine   Tablet 10 milliGRAM(s) Oral daily  chlorhexidine 0.12% Liquid 15 milliLiter(s) Oral Mucosa every 12 hours  chlorhexidine 4% Liquid 1 Application(s) Topical <User Schedule>  dextrose 5%. 1000 milliLiter(s) (50 mL/Hr) IV Continuous <Continuous>  dextrose 50% Injectable 12.5 Gram(s) IV Push once  dextrose 50% Injectable 25 Gram(s) IV Push once  dextrose 50% Injectable 25 Gram(s) IV Push once  doxazosin 2 milliGRAM(s) Oral at bedtime  enoxaparin Injectable 30 milliGRAM(s) SubCutaneous two times a day  ertapenem  IVPB 1000 milliGRAM(s) IV Intermittent <User Schedule>  insulin lispro (HumaLOG) corrective regimen sliding scale   SubCutaneous every 6 hours  insulin NPH human recombinant 48 Unit(s) SubCutaneous <User Schedule>  insulin NPH human recombinant 40 Unit(s) SubCutaneous <User Schedule>  insulin NPH human recombinant 40 Unit(s) SubCutaneous <User Schedule>  insulin NPH human recombinant 24 Unit(s) SubCutaneous <User Schedule>  lacosamide Solution 200 milliGRAM(s) Oral two times a day  levETIRAcetam  Solution 1000 milliGRAM(s) Oral two times a day  levothyroxine 150 MICROGram(s) Oral daily  metoprolol tartrate 25 milliGRAM(s) Oral two times a day  pantoprazole  Injectable 40 milliGRAM(s) IV Push daily  predniSONE   Tablet 15 milliGRAM(s) Oral <User Schedule>  predniSONE   Tablet 5 milliGRAM(s) Oral at bedtime  simvastatin 20 milliGRAM(s) Oral at bedtime    MEDICATIONS  (PRN):  acetaminophen    Suspension .. 650 milliGRAM(s) Oral every 6 hours PRN Temp greater or equal to 38C (100.4F), Mild Pain (1 - 3)  dextrose 40% Gel 15 Gram(s) Oral once PRN Blood Glucose LESS THAN 70 milliGRAM(s)/deciliter  fentaNYL    Injectable 25 MICROGram(s) IV Push every 2 hours PRN Severe Pain (7 - 10)  glucagon  Injectable 1 milliGRAM(s) IntraMuscular once PRN Glucose LESS THAN 70 milligrams/deciliter  sodium chloride 0.9% lock flush 10 milliLiter(s) IV Push every 1 hour PRN Pre/post blood products, medications, blood draw, and to maintain line patency                                   8.5    9.51  )-----------( 468      ( 11 Jun 2020 22:24 )             27.7   06-11    149<H>  |  109<H>  |  29<H>  ----------------------------<  107<H>  4.0   |  25  |  0.58    Ca    9.2      11 Jun 2020 22:24  Phos  3.8     06-11  Mg     2.2     06-11          ABG - ( 10 Nile 2020 06:45 )  pH, Arterial: 7.42  pH, Blood: x     /  pCO2: 40    /  pO2: 192   / HCO3: 26    / Base Excess: 1.7   /  SaO2: 100           ABG - ( 08 Jun 2020 01:52 )  pH, Arterial: 7.45  pH, Blood: x     /  pCO2: 36    /  pO2: 121   / HCO3: 25    / Base Excess: 1.5   /  SaO2: 99        POCT Blood Glucose.: 210 mg/dL (08 Jun 2020 05:07)  POCT Blood Glucose.: 199 mg/dL (07 Jun 2020 23:37)  POCT Blood Glucose.: 231 mg/dL (07 Jun 2020 17:01)  POCT Blood Glucose.: 231 mg/dL (07 Jun 2020 11:35)      CULTURES:  Culture Results:   >100,000 CFU/ml Escherichia coli ESBL (06-04 @ 01:09)  Culture Results:   No growth to date. (06-03 @ 15:00)      PHYSICAL EXAMINATION:   General: No Acute Distress   Neurological: Intubated, pupils 2mm and reactive, +corneal reflexes, +cough/gag, +overbreathing vent set rate, not following commands, left gaze preference, RUE trace flexion movement, LUE trace flexion movement, BLEs TF L>R  Pulmonary: Mildly diminished at bases  Cardiovascular: S1, S2, Regular Rate and Rhythm   Gastrointestinal: Soft, Nontender, Nondistended SUMMARY:    64 year-old woman with history of hypothyroidism, HTN, IDDM, COPD/CHRIS on home O2, RA (on Prednisone), SBO s/p ex lap with lysis of adhesions c/b evisceration,  recent hospitalization in 3/2020 with encephalopathy, significantly hypothyroid (, without myxedema coma), small (stable) right sided SDH, UTI, catatonic reaction to VPA (now resolved) and DKA and psychiatric issues, 5/2020 hospitalization for GIB, Emphysematous cystitis, endometritis, Sigmoid colitis, morbid obesity and functional quadriplegia admitted 5/29/20 for left frontal intracerebral hemorrhage with intraventricular extension. Her ICU course has been notable for GPDs on EEG treated with levetiracetam and lacosamide, uncontrolled diabetes mellitus II treated with insulin drip and then transitioned to NPH and ESBL UTI.   HCP: Norris Rush, Son [phone]; Daughter, Heavenly Schmidt [phone]; son asks that only him or his sister, Heavenly be contacted for patient updates/information as they are both proxys for patient.    HOSP COURSE:   5/29: DDAVP  6/9: Found to have LUE spasmodic movements. Aborted after holding LUE.   6/9: [facility] meeting with palliative care, ethics.       OVERNIGHT EVENTS: Afebrile    ICU Vital Signs Last 24 Hrs  T(C): 36.6 (12 Jun 2020 07:00), Max: 36.7 (11 Jun 2020 16:00)  T(F): 97.8 (12 Jun 2020 07:00), Max: 98.1 (11 Jun 2020 16:00)  HR: 109 (12 Jun 2020 07:56) (98 - 120)  ABP: 122/67 (12 Jun 2020 07:00) (97/71 - 139/83)  ABP(mean): 89 (12 Jun 2020 07:00) (85 - 105)  RR: 20 (12 Jun 2020 07:00) (14 - 29)  SpO2: 100% (12 Jun 2020 07:56) (100% - 100%)      06-11-20 @ 07:01  -  06-12-20 @ 07:00  --------------------------------------------------------  IN: 1940 mL / OUT: 1450 mL / NET: 490 mL    Mode: AC/ CMV (Assist Control/ Continuous Mandatory Ventilation), RR (machine): 14, TV (machine): 450, FiO2: 50, PEEP: 5, ITime: 0.8, MAP: 12, PIP: 23    acetaminophen    Suspension .. 650 milliGRAM(s) Oral every 6 hours PRN  amLODIPine   Tablet 10 milliGRAM(s) Oral daily  chlorhexidine 0.12% Liquid 15 milliLiter(s) Oral Mucosa every 12 hours  chlorhexidine 4% Liquid 1 Application(s) Topical <User Schedule>  dextrose 40% Gel 15 Gram(s) Oral once PRN  dextrose 5%. 1000 milliLiter(s) (50 mL/Hr) IV Continuous <Continuous>  dextrose 50% Injectable 12.5 Gram(s) IV Push once  dextrose 50% Injectable 25 Gram(s) IV Push once  dextrose 50% Injectable 25 Gram(s) IV Push once  doxazosin 2 milliGRAM(s) Oral at bedtime  enoxaparin Injectable 30 milliGRAM(s) SubCutaneous two times a day  ertapenem  IVPB 1000 milliGRAM(s) IV Intermittent <User Schedule>  fentaNYL    Injectable 25 MICROGram(s) IV Push every 2 hours PRN  glucagon  Injectable 1 milliGRAM(s) IntraMuscular once PRN  insulin lispro (HumaLOG) corrective regimen sliding scale   SubCutaneous every 6 hours  insulin NPH human recombinant 48 Unit(s) SubCutaneous <User Schedule>  insulin NPH human recombinant 40 Unit(s) SubCutaneous <User Schedule>  insulin NPH human recombinant 40 Unit(s) SubCutaneous <User Schedule>  insulin NPH human recombinant 24 Unit(s) SubCutaneous <User Schedule>  lacosamide Solution 200 milliGRAM(s) Oral two times a day  levETIRAcetam  Solution 1000 milliGRAM(s) Oral two times a day  levothyroxine 150 MICROGram(s) Oral daily  metoprolol tartrate 25 milliGRAM(s) Oral two times a day  pantoprazole  Injectable 40 milliGRAM(s) IV Push daily  predniSONE   Tablet 15 milliGRAM(s) Oral <User Schedule>  predniSONE   Tablet 5 milliGRAM(s) Oral at bedtime  simvastatin 20 milliGRAM(s) Oral at bedtime  sodium chloride 0.9% lock flush 10 milliLiter(s) IV Push every 1 hour PRN      LABS:  Na: 149 (06-11 @ 22:24), 144 (06-10 @ 01:40)  K: 4.0 (06-11 @ 22:24), 4.4 (06-10 @ 01:40)  Cl: 109 (06-11 @ 22:24), 105 (06-10 @ 01:40)  CO2: 25 (06-11 @ 22:24), 26 (06-10 @ 01:40)  BUN: 29 (06-11 @ 22:24), 25 (06-10 @ 01:40)  Cr: 0.58 (06-11 @ 22:24), 0.63 (06-10 @ 01:40)  Glu: 107(06-11 @ 22:24), 137(06-10 @ 01:40)    Hgb: 8.5 (06-11 @ 22:24), 9.3 (06-10 @ 01:40)  Hct: 27.7 (06-11 @ 22:24), 29.9 (06-10 @ 01:40)  WBC: 9.51 (06-11 @ 22:24), 12.14 (06-10 @ 01:40)  Plt: 468 (06-11 @ 22:24), 461 (06-10 @ 01:40)    POCT Blood Glucose.: 134 mg/dL (12 Jun 2020 05:15)  POCT Blood Glucose.: 112 mg/dL (11 Jun 2020 22:57)  POCT Blood Glucose.: 175 mg/dL (11 Jun 2020 17:35)  POCT Blood Glucose.: 171 mg/dL (11 Jun 2020 11:42)      CULTURES:  Culture Results:   >100,000 CFU/ml Escherichia coli ESBL (06-04 @ 01:09)  Culture Results:   No growth to date. (06-03 @ 15:00)      PHYSICAL EXAMINATION:   General: No Acute Distress   Neurological: Intubated, pupils 2mm and reactive, +corneal reflexes, +cough/gag, +overbreathing vent set rate, not following commands, left gaze preference, RUE trace flexion movement, LUE trace flexion movement, BLEs TF L>R  Pulmonary: Mildly diminished at bases  Cardiovascular: S1, S2, Regular Rate and Rhythm   Gastrointestinal: Soft, Nontender, Nondistended

## 2020-06-12 NOTE — PROGRESS NOTE ADULT - ASSESSMENT
65 yo F well known by endo team from prior admission. Pt w/h/o uncontrolled T2DM with no known complications. Also h/o hypothyroidism/ul HTN/ COPD and RA on chronic steroids. Here with mental status changes associated with shaking found to have a left frontal parenchymal hematoma with mass effect on the left lateral ventricle and intraventricular extension with blood layering in the occipital horns. Endocrine following for hyperglycemia on chronic steroids bid.  Tolerating TFs with BG values mostly at goal on present insulin regimen. Noted BG lower at 12mn 80s to low 100s. No hypoglycemia. Will preventively decrease NPH dose at 6pm. Will c/w all ther insulin doses as noted above.   BG goal (100-180mg/dl).     Spent > 15 minutes assessing pt/labs/meds and discussing plan of care with primary team

## 2020-06-12 NOTE — PROGRESS NOTE ADULT - SUBJECTIVE AND OBJECTIVE BOX
DIABETES FOLLOW UP NOTE: Saw pt earlier today  INTERVAL HX: 63 yo F well known by endo team from prior admission. Pt w/h/o uncontrolled T2DM with no known complications. Also h/o hypothyroidism/ul HTN/ COPD and RA on chronic steroids. Here with mental status changes associated with shaking found to have a left frontal parenchymal hematoma with mass effect on the left lateral ventricle and intraventricular extension with blood layering in the occipital horns. Endocrine following for hyperglycemia on chronic steroids (prednisone 15mg q am and 5 qhs). Tolerating TF of Glucerna at 65cc/hr with BG values mostly at goal on present insulin regimen. Noted BG lower at 12mn 80s to low 100s. No hypoglycemia. On antibiotic for UTI.      Review of Systems:  General: Unable. Pt unresponsive    Allergies    Depakote (Other)  Seroquel (Other (Severe))    Intolerances      MEDICATIONS   ertapenem  IVPB 1000 milliGRAM(s) IV Intermittent <User Schedule>  insulin lispro (HumaLOG) corrective regimen sliding scale   SubCutaneous every 6 hours  insulin NPH human recombinant 48 Unit(s) SubCutaneous <User Schedule>  insulin NPH human recombinant 40 Unit(s) SubCutaneous <User Schedule>  insulin NPH human recombinant 40 Unit(s) SubCutaneous <User Schedule>  insulin NPH human recombinant 24 Unit(s) SubCutaneous <User Schedule>  levothyroxine 150 MICROGram(s) Oral daily  predniSONE   Tablet 15 milliGRAM(s) Oral <User Schedule>  predniSONE   Tablet 5 milliGRAM(s) Oral at bedtime  simvastatin 20 milliGRAM(s) Oral at bedtime      PHYSICAL EXAM:  VITALS: T(C): 36.6 (06-12-20 @ 15:00)  T(F): 97.9 (06-12-20 @ 15:00), Max: 98.8 (06-12-20 @ 11:00)  HR: 102 (06-12-20 @ 15:00) (89 - 120)  BP: --  RR:  (14 - 29)  SpO2:  (100% - 100%)  Wt(kg): --  GENERAL: Female laying in bed in NAD  HEENT: Intubated with GT feedings going at  65cc/hr at time of visit.  Abdomen: Soft, nontender, non distended, obese  Extremities: Warm, trace edeam noted in all 4 exts  NEURO: Unresponsive     LABS:  POCT Blood Glucose.: 113 mg/dL (06-12-20 @ 11:57)  POCT Blood Glucose.: 134 mg/dL (06-12-20 @ 05:15)  POCT Blood Glucose.: 112 mg/dL (06-11-20 @ 22:57)  POCT Blood Glucose.: 175 mg/dL (06-11-20 @ 17:35)  POCT Blood Glucose.: 171 mg/dL (06-11-20 @ 11:42)  POCT Blood Glucose.: 111 mg/dL (06-11-20 @ 06:09)  POCT Blood Glucose.: 110 mg/dL (06-11-20 @ 00:51)  POCT Blood Glucose.: 82 mg/dL (06-11-20 @ 00:47)  POCT Blood Glucose.: 121 mg/dL (06-10-20 @ 17:25)  POCT Blood Glucose.: 226 mg/dL (06-10-20 @ 11:57)  POCT Blood Glucose.: 171 mg/dL (06-10-20 @ 06:35)  POCT Blood Glucose.: 120 mg/dL (06-09-20 @ 23:45)  POCT Blood Glucose.: 160 mg/dL (06-09-20 @ 17:55)                            8.5    9.51  )-----------( 468      ( 11 Jun 2020 22:24 )             27.7       06-11    149<H>  |  109<H>  |  29<H>  ----------------------------<  107<H>  4.0   |  25  |  0.58      EGFR if non : 99    Ca    9.2      06-11  Mg     2.2     06-11  Phos  3.8     06-11        Thyroid Function Tests:  06-11 @ 13:58 TSH 8.26 FreeT4 -- T3 55 Anti TPO -- Anti Thyroglobulin Ab -- TSI --  06-04 @ 01:58 TSH -- FreeT4 2.7 T3 -- Anti TPO -- Anti Thyroglobulin Ab -- TSI --      A1C with Estimated Average Glucose Result: 7.4 % (05-30-20 @ 04:43)  A1C with Estimated Average Glucose Result: 6.4 % (05-14-20 @ 16:23)  A1C with Estimated Average Glucose Result: 6.5 % (05-13-20 @ 08:24)      Estimated Average Glucose: 166 mg/dL (05-30-20 @ 04:43)  Estimated Average Glucose: 137 mg/dL (05-14-20 @ 16:23)  Estimated Average Glucose: 140 mg/dL (05-13-20 @ 08:24)        05-30 Chol 202<H> LDL 88 HDL 49<L> Trig 326<H>

## 2020-06-12 NOTE — PROGRESS NOTE ADULT - PROBLEM SELECTOR PLAN 1
-test BG Q6h  -Adjust NPH as follows  12am: c/w NPH 40 units  6am: c/w NPH 48 units  12pm: Decrease NPH 40 units   6pm: Decrease NPH 22 units  -Hold NPH if TF off. Can reduce dose if BG less than 100mg/dl  -c/w Humalog moderate correction scale Q6h  Please notify endocrine if any changes in steroid regimen or TF regimen so we can adjust insulin regimen accordingly.  -Plan discussed with pt/team.  Contact info: [phone] (24/7). pager [phone]

## 2020-06-12 NOTE — PROGRESS NOTE ADULT - ASSESSMENT
ASSESSMENT:  Left frontal intracerebral hemorrhage, generalized periodic discharges, UTI.     NEURO:  Q4 neurochecks  Cont Keppra and Vimpat for GPDs/risk for seizures  Pain management with Tylenol.   Given previous psych history and workup not revealing any cause, NMDA encephalitis testing sent -> NMDA AB neg.    Trach and PEG being discussed with family.   Activity: [] OOB as tolerated [] Bedrest [X] PT [X] OT [] PMNR      PULM:  Intubated since 5/29- Day 13 of intubation.   History of COPD   CPAP as tolerated. Will need trach if family desires.  Mild hypoxemia- likely atelectasis. Monitor.      CV:  Keep -160  TTE negative for PFO  HTN: Continue norvasc and metoprolol    Statin for HLD    D/C [place]      RENAL:  Na wnl  IVL.   Female iraheta. Bladder scan.  Start low dose cardura re: urine retention    GI:  Diet: On Glucerna Tube feeds  Last BM 6/11. Banana flakes  GI prophylaxis [] not indicated [X] PPI [] other:      ENDO:   Endo Following - adjusting NPH. Scheduled doses by endo.   Better control  Continue synthroid. Change to PO  Continue prednisone 15mg, 5 mg at bedtime (was on it at home for RA)  Goal euglycemia (-180)      HEME/ONC:  On SQL.  VTE prophylaxis: [X] SCDs [X] chemoprophylaxis [] high risk of DVT/PE on admission due to:  LE doppler 5/30- No DVT  Anemia- improved.    ID:  Afebrile   Ertapnem for ESBL x 7 days      MISC: Palliative care consulted. Family meeting 6/10 with daughter Heavenly, son (Norris), , Ethics and palliative care.   Son Norris would like to reconvene on 6/12 with 7 other siblings      SOCIAL/FAMILY:  [X] updated family via phone  [] family meeting    CODE STATUS:  [X] Full Code [] DNR [] DNI [] Palliative/Comfort Care    DISPOSITION:  [X] ICU [] Stroke Unit [] Floor [] EMU [] RCU [] PCU    [X] Patient is at high risk of neurologic deterioration/death due to: resp failure, seizures, hemorrhage.     Time spent:  45 critical care minutes ASSESSMENT:  Left frontal intracerebral hemorrhage, generalized periodic discharges, UTI.     NEURO:  Q4 neurochecks  Cont Keppra and Vimpat for GPDs/risk for seizures  Pain management with Tylenol.   Given previous psych history and workup not revealing any cause, NMDA encephalitis testing sent -> NMDA AB neg.    Trach and PEG being discussed with family.   Activity: [] OOB as tolerated [] Bedrest [X] PT [X] OT [] PMNR  Baseline mRS 4      PULM:  Intubated since 5/29- Day 14 of intubation.   History of COPD   CPAP as tolerated. Will need trach if family desires.  Mild hypoxemia- likely atelectasis. Monitor.  CXR 6/12 clear.      CV:  Keep -160  TTE negative for PFO  HTN: Continue norvasc and metoprolol    Statin for HLD    D/C [place].       RENAL:  Na 149.   IVL.   Monitor Is/Os  Female iraheta. Bladder scan.  Start low dose cardura re: urine retention    GI:  Diet: On Glucerna Tube feeds  Last BM 6/12. Banana flakes. Rectal tube.   GI prophylaxis [] not indicated [X] PPI [] other:  Change PPI Iv to PO      ENDO:   Endo Following - adjusting NPH. Scheduled doses by endo.   Better control  Continue synthroid. Change to PO  Continue prednisone 15mg, 5 mg at bedtime (was on it at home for RA). Likely etiology of bruising  Goal euglycemia (-180)      HEME/ONC:  On SQL.  VTE prophylaxis: [X] SCDs [X] chemoprophylaxis [] high risk of DVT/PE on admission due to:  LE doppler 5/30- No DVT  Anemia- improved.    ID:  Afebrile   Ertapnem for ESBL x 7 days      MISC: Palliative care consulted. Family meeting 6/10 with daughter Heavenly, son (Norris), SW, Ethics and palliative care.   Son Norris would like to reconvene on 6/12 with 7 other siblings      SOCIAL/FAMILY:  [X] updated family via phone  [] family meeting    CODE STATUS:  [X] Full Code [] DNR [] DNI [] Palliative/Comfort Care    DISPOSITION:  [X] ICU [] Stroke Unit [] Floor [] EMU [] RCU [] PCU    [X] Patient is at high risk of neurologic deterioration/death due to: resp failure, seizures, hemorrhage.     Time spent:  45 critical care minutes

## 2020-06-12 NOTE — PROGRESS NOTE ADULT - ASSESSMENT
ASSESSMENT:  Left frontal intracerebral hemorrhage, generalized periodic discharges, UTI.     NEURO:  Q4 neurochecks  Cont Keppra and Vimpat for GPDs/risk for seizures  Pain management with Tylenol.   Given previous psych history and workup not revealing any cause, NMDA encephalitis testing sent -> NMDA AB neg.    Trach and PEG being discussed with family.   Activity: [] OOB as tolerated [] Bedrest [X] PT [X] OT [] PMNR  Baseline mRS 4      PULM:  Intubated since 5/29- Day 14 of intubation.   History of COPD   CPAP as tolerated. Failed cpap this AM. Will need trach if family desires.  Mild hypoxemia- likely atelectasis. Monitor.  CXR 6/12 clear.      CV:  Keep -160  TTE negative for PFO  HTN: Continue norvasc and metoprolol    Statin for HLD    D/C [place].       RENAL:  Na 149.   IVL.   Monitor Is/Os  Female iraheta. Bladder scan.  Start low dose cardura re: urine retention    GI:  Diet: On Glucerna Tube feeds  Last BM 6/12. Banana flakes. Rectal tube.   GI prophylaxis [] not indicated [X] PPI [] other:  Change PPI Iv to PO      ENDO:   Endo Following - adjusting NPH. Scheduled doses by endo.   Better control  Continue synthroid. Change to PO  Continue prednisone 15mg, 5 mg at bedtime (was on it at home for RA). Likely etiology of bruising  Goal euglycemia (-180)      HEME/ONC:  On SQL.  VTE prophylaxis: [X] SCDs [X] chemoprophylaxis [] high risk of DVT/PE on admission due to:  LE doppler 5/30- No DVT  Anemia- improved.    ID:  Afebrile   Ertapnem for ESBL x 7 days until 6/14      MISC: Palliative care consulted. Family meeting 6/12 today with daughter Heavenly, son (Norris), SW, Ethics and palliative care. Will continue GOC discussion, no decision for trach/peg at this point.       SOCIAL/FAMILY:  [X] updated family via phone  [] family meeting    CODE STATUS:  [X] Full Code [] DNR [] DNI [] Palliative/Comfort Care    DISPOSITION:  [X] ICU [] Stroke Unit [] Floor [] EMU [] RCU [] PCU    [X] Patient is at high risk of neurologic deterioration/death due to: resp failure, seizures, hemorrhage.     Time spent:  35 critical care minutes

## 2020-06-12 NOTE — CHART NOTE - NSCHARTNOTEFT_GEN_A_CORE
R axillary kai removal done in standard fashion without incident with dp to hemostasis and no vis.  bleeding or hematoma.  occlusively dressed.  tolerated well.

## 2020-06-12 NOTE — PROGRESS NOTE ADULT - PROBLEM SELECTOR PLAN 2
-Levothyroxine 150mcg daily  -Hold TF 1 hour prior and after levothyroxine given.   -TSH/Free T4  reviewed . Discussed with endo Dr Saul yesterday and recommending to c/w present Synthroid dose.

## 2020-06-12 NOTE — PROGRESS NOTE ADULT - SUBJECTIVE AND OBJECTIVE BOX
HPI:  65 yo F with AMS since this morning.  Pt. was confused, didn't recognize son this morning.  He notes she was sleeping more than usual yesterday, which tipped off son that something was wrong.  At 11 am pt. was gasping for air like she was seizing. 2 hours ago pt. was dry heaving, her chest was going up and down and she started shaking like she was having a seizure.  Pt. has no history of seizures per son.    PMH: hypothyroidism, HTN, IDDM, COPD/CHRIS on home O2, RA (on Prednisone), SBO s/p ex lap with lysis of adhesions c/b evisceration,  recent hospitalization in 3/2020 with encephalopathy, significantly hypothyroid (, without myxedema coma), small (stable) right sided SDH, UTI, catatonic reaction to VPA (now resolved) and DKA and psychiatric issues, 5/2020 hospitalization for GIB, Emphysematous cystitis, endometritis, Sigmoid colitis, morbid obesity, functional quadriplegia    HCP: Norris Rush, Son [phone]; Daughter, Heavenly Schmidt [phone]; son asks that only him or his sister, Heavenly be contacted for patient updates/information as they are both proxy's for patient (29 May 2020 23:41)    HOSP COURSE:   5/29: DDAVP  6/9: Found to have LUE spasmodic movements. Aborted after holding LUE.   6/9: GO meeting with palliative care, ethics.   6/12: Ongoing discussion for GOC discussion    VITALS:  T(C): , Max: 37.5 (06-12-20 @ 19:00)  HR:  (89 - 110)  BP:  (126/72 - 126/72)  ABP:  (97/71 - 135/80)  RR:  (14 - 29)  SpO2:  (100% - 100%)  Wt(kg): --  Device: Avea, Mode: AC/ CMV (Assist Control/ Continuous Mandatory Ventilation), RR (machine): 14, TV (machine): 450, FiO2: 40, PEEP: 5, ITime: 0.8, MAP: 10, PIP: 25    06-11-20 @ 07:01  -  06-12-20 @ 07:00  --------------------------------------------------------  IN: 1940 mL / OUT: 1450 mL / NET: 490 mL    06-12-20 @ 07:01  -  06-12-20 @ 19:39  --------------------------------------------------------  IN: 900 mL / OUT: 900 mL / NET: 0 mL      LABS:  Na: 149 (06-11 @ 22:24), 144 (06-10 @ 01:40)  K: 4.0 (06-11 @ 22:24), 4.4 (06-10 @ 01:40)  Cl: 109 (06-11 @ 22:24), 105 (06-10 @ 01:40)  CO2: 25 (06-11 @ 22:24), 26 (06-10 @ 01:40)  BUN: 29 (06-11 @ 22:24), 25 (06-10 @ 01:40)  Cr: 0.58 (06-11 @ 22:24), 0.63 (06-10 @ 01:40)  Glu: 107(06-11 @ 22:24), 137(06-10 @ 01:40)    Hgb: 8.5 (06-11 @ 22:24), 9.3 (06-10 @ 01:40)  Hct: 27.7 (06-11 @ 22:24), 29.9 (06-10 @ 01:40)  WBC: 9.51 (06-11 @ 22:24), 12.14 (06-10 @ 01:40)  Plt: 468 (06-11 @ 22:24), 461 (06-10 @ 01:40)      IMAGING:   Recent imaging studies were reviewed.    MEDICATIONS:  acetaminophen    Suspension .. 650 milliGRAM(s) Oral every 6 hours PRN  amLODIPine   Tablet 10 milliGRAM(s) Oral daily  chlorhexidine 0.12% Liquid 15 milliLiter(s) Oral Mucosa every 12 hours  chlorhexidine 4% Liquid 1 Application(s) Topical <User Schedule>  dextrose 40% Gel 15 Gram(s) Oral once PRN  dextrose 5%. 1000 milliLiter(s) IV Continuous <Continuous>  dextrose 50% Injectable 12.5 Gram(s) IV Push once  dextrose 50% Injectable 25 Gram(s) IV Push once  dextrose 50% Injectable 25 Gram(s) IV Push once  doxazosin 2 milliGRAM(s) Oral at bedtime  enoxaparin Injectable 30 milliGRAM(s) SubCutaneous two times a day  ertapenem  IVPB 1000 milliGRAM(s) IV Intermittent <User Schedule>  glucagon  Injectable 1 milliGRAM(s) IntraMuscular once PRN  insulin lispro (HumaLOG) corrective regimen sliding scale   SubCutaneous every 6 hours  insulin NPH human recombinant 48 Unit(s) SubCutaneous <User Schedule>  insulin NPH human recombinant 40 Unit(s) SubCutaneous <User Schedule>  insulin NPH human recombinant 40 Unit(s) SubCutaneous <User Schedule>  insulin NPH human recombinant 22 Unit(s) SubCutaneous <User Schedule>  lacosamide Solution 200 milliGRAM(s) Oral two times a day  levETIRAcetam  Solution 1000 milliGRAM(s) Oral two times a day  levothyroxine 150 MICROGram(s) Oral daily  metoprolol tartrate 25 milliGRAM(s) Oral two times a day  pantoprazole  Injectable 40 milliGRAM(s) IV Push daily  predniSONE   Tablet 15 milliGRAM(s) Oral <User Schedule>  predniSONE   Tablet 5 milliGRAM(s) Oral at bedtime  simvastatin 20 milliGRAM(s) Oral at bedtime  sodium chloride 0.9% lock flush 10 milliLiter(s) IV Push every 1 hour PRN    PHYSICAL EXAMINATION:   General: No Acute Distress   Neurological: Intubated, pupils 3mm and reactive, +corneal reflexes, +cough/gag, +overbreathing vent set rate, not following commands, left gaze preference, RUE trace flexion movement, LUE trace flexion movement, intermittent upper extremity tremors, BLEs TF L>R  Pulmonary: Mildly diminished at bases  Cardiovascular: S1, S2, Regular Rate and Rhythm   Gastrointestinal: Soft, Nontender, Nondistended HPI:  65 yo F with AMS since this morning.  Pt. was confused, didn't recognize son this morning.  He notes she was sleeping more than usual yesterday, which tipped off son that something was wrong.  At 11 am pt. was gasping for air like she was seizing. 2 hours ago pt. was dry heaving, her chest was going up and down and she started shaking like she was having a seizure.  Pt. has no history of seizures per son.    PMH: hypothyroidism, HTN, IDDM, COPD/CHRIS on home O2, RA (on Prednisone), SBO s/p ex lap with lysis of adhesions c/b evisceration,  recent hospitalization in 3/2020 with encephalopathy, significantly hypothyroid (, without myxedema coma), small (stable) right sided SDH, UTI, catatonic reaction to VPA (now resolved) and DKA and psychiatric issues, 5/2020 hospitalization for GIB, Emphysematous cystitis, endometritis, Sigmoid colitis, morbid obesity, functional quadriplegia    HCP: Norris Rush, Son [phone]; Daughter, Heavenly Schmidt [phone]; son asks that only him or his sister, Heavenly be contacted for patient updates/information as they are both proxy's for patient (29 May 2020 23:41)    HOSP COURSE:   5/29: DDAVP  6/9: Found to have LUE spasmodic movements. Aborted after holding LUE.   6/9: GO meeting with palliative care, ethics.   6/12: Ongoing discussion for GOC discussion    VITALS:  T(C): , Max: 37.5 (06-12-20 @ 19:00)  HR:  (89 - 110)  BP:  (126/72 - 126/72)  ABP:  (97/71 - 135/80)  RR:  (14 - 29)  SpO2:  (100% - 100%)  Wt(kg): --  Device: Avea, Mode: AC/ CMV (Assist Control/ Continuous Mandatory Ventilation), RR (machine): 14, TV (machine): 450, FiO2: 40, PEEP: 5, ITime: 0.8, MAP: 10, PIP: 25    06-11-20 @ 07:01  -  06-12-20 @ 07:00  --------------------------------------------------------  IN: 1940 mL / OUT: 1450 mL / NET: 490 mL    06-12-20 @ 07:01  -  06-12-20 @ 19:39  --------------------------------------------------------  IN: 900 mL / OUT: 900 mL / NET: 0 mL      LABS:  Na: 149 (06-11 @ 22:24), 144 (06-10 @ 01:40)  K: 4.0 (06-11 @ 22:24), 4.4 (06-10 @ 01:40)  Cl: 109 (06-11 @ 22:24), 105 (06-10 @ 01:40)  CO2: 25 (06-11 @ 22:24), 26 (06-10 @ 01:40)  BUN: 29 (06-11 @ 22:24), 25 (06-10 @ 01:40)  Cr: 0.58 (06-11 @ 22:24), 0.63 (06-10 @ 01:40)  Glu: 107(06-11 @ 22:24), 137(06-10 @ 01:40)    Hgb: 8.5 (06-11 @ 22:24), 9.3 (06-10 @ 01:40)  Hct: 27.7 (06-11 @ 22:24), 29.9 (06-10 @ 01:40)  WBC: 9.51 (06-11 @ 22:24), 12.14 (06-10 @ 01:40)  Plt: 468 (06-11 @ 22:24), 461 (06-10 @ 01:40)      IMAGING:   Recent imaging studies were reviewed.    MEDICATIONS:  acetaminophen    Suspension .. 650 milliGRAM(s) Oral every 6 hours PRN  amLODIPine   Tablet 10 milliGRAM(s) Oral daily  chlorhexidine 0.12% Liquid 15 milliLiter(s) Oral Mucosa every 12 hours  chlorhexidine 4% Liquid 1 Application(s) Topical <User Schedule>  dextrose 40% Gel 15 Gram(s) Oral once PRN  dextrose 5%. 1000 milliLiter(s) IV Continuous <Continuous>  dextrose 50% Injectable 12.5 Gram(s) IV Push once  dextrose 50% Injectable 25 Gram(s) IV Push once  dextrose 50% Injectable 25 Gram(s) IV Push once  doxazosin 2 milliGRAM(s) Oral at bedtime  enoxaparin Injectable 30 milliGRAM(s) SubCutaneous two times a day  ertapenem  IVPB 1000 milliGRAM(s) IV Intermittent <User Schedule>  glucagon  Injectable 1 milliGRAM(s) IntraMuscular once PRN  insulin lispro (HumaLOG) corrective regimen sliding scale   SubCutaneous every 6 hours  insulin NPH human recombinant 48 Unit(s) SubCutaneous <User Schedule>  insulin NPH human recombinant 40 Unit(s) SubCutaneous <User Schedule>  insulin NPH human recombinant 40 Unit(s) SubCutaneous <User Schedule>  insulin NPH human recombinant 22 Unit(s) SubCutaneous <User Schedule>  lacosamide Solution 200 milliGRAM(s) Oral two times a day  levETIRAcetam  Solution 1000 milliGRAM(s) Oral two times a day  levothyroxine 150 MICROGram(s) Oral daily  metoprolol tartrate 25 milliGRAM(s) Oral two times a day  pantoprazole  Injectable 40 milliGRAM(s) IV Push daily  predniSONE   Tablet 15 milliGRAM(s) Oral <User Schedule>  predniSONE   Tablet 5 milliGRAM(s) Oral at bedtime  simvastatin 20 milliGRAM(s) Oral at bedtime  sodium chloride 0.9% lock flush 10 milliLiter(s) IV Push every 1 hour PRN    PHYSICAL EXAMINATION:   General: No Acute Distress   Neurological: Intubated, pupils 3mm and reactive, +corneal reflexes, +cough/gag, +overbreathing vent set rate, not following commands, dysconjugate gaze, RUE trace flexion movement, LUE trace flexion movement, intermittent upper extremity tremors, BLEs TF L>R  Pulmonary: Mildly diminished at bases  Cardiovascular: S1, S2, Regular Rate and Rhythm   Gastrointestinal: Soft, Nontender, Nondistended

## 2020-06-13 LAB
ANION GAP SERPL CALC-SCNC: 12 MMOL/L — SIGNIFICANT CHANGE UP (ref 5–17)
BUN SERPL-MCNC: 23 MG/DL — SIGNIFICANT CHANGE UP (ref 7–23)
CALCIUM SERPL-MCNC: 9.3 MG/DL — SIGNIFICANT CHANGE UP (ref 8.4–10.5)
CHLORIDE SERPL-SCNC: 107 MMOL/L — SIGNIFICANT CHANGE UP (ref 96–108)
CO2 SERPL-SCNC: 25 MMOL/L — SIGNIFICANT CHANGE UP (ref 22–31)
CREAT SERPL-MCNC: 0.55 MG/DL — SIGNIFICANT CHANGE UP (ref 0.5–1.3)
GLUCOSE BLDC GLUCOMTR-MCNC: 169 MG/DL — HIGH (ref 70–99)
GLUCOSE BLDC GLUCOMTR-MCNC: 182 MG/DL — HIGH (ref 70–99)
GLUCOSE BLDC GLUCOMTR-MCNC: 196 MG/DL — HIGH (ref 70–99)
GLUCOSE BLDC GLUCOMTR-MCNC: 99 MG/DL — SIGNIFICANT CHANGE UP (ref 70–99)
GLUCOSE SERPL-MCNC: 88 MG/DL — SIGNIFICANT CHANGE UP (ref 70–99)
HCT VFR BLD CALC: 28.4 % — LOW (ref 34.5–45)
HGB BLD-MCNC: 8.6 G/DL — LOW (ref 11.5–15.5)
MAGNESIUM SERPL-MCNC: 2.1 MG/DL — SIGNIFICANT CHANGE UP (ref 1.6–2.6)
MCHC RBC-ENTMCNC: 29.5 PG — SIGNIFICANT CHANGE UP (ref 27–34)
MCHC RBC-ENTMCNC: 30.3 GM/DL — LOW (ref 32–36)
MCV RBC AUTO: 97.3 FL — SIGNIFICANT CHANGE UP (ref 80–100)
NRBC # BLD: 0 /100 WBCS — SIGNIFICANT CHANGE UP (ref 0–0)
PHOSPHATE SERPL-MCNC: 3.2 MG/DL — SIGNIFICANT CHANGE UP (ref 2.5–4.5)
PLATELET # BLD AUTO: 482 K/UL — HIGH (ref 150–400)
POTASSIUM SERPL-MCNC: 3.8 MMOL/L — SIGNIFICANT CHANGE UP (ref 3.5–5.3)
POTASSIUM SERPL-SCNC: 3.8 MMOL/L — SIGNIFICANT CHANGE UP (ref 3.5–5.3)
RBC # BLD: 2.92 M/UL — LOW (ref 3.8–5.2)
RBC # FLD: 19.5 % — HIGH (ref 10.3–14.5)
SARS-COV-2 RNA SPEC QL NAA+PROBE: SIGNIFICANT CHANGE UP
SODIUM SERPL-SCNC: 144 MMOL/L — SIGNIFICANT CHANGE UP (ref 135–145)
WBC # BLD: 8.33 K/UL — SIGNIFICANT CHANGE UP (ref 3.8–10.5)
WBC # FLD AUTO: 8.33 K/UL — SIGNIFICANT CHANGE UP (ref 3.8–10.5)

## 2020-06-13 PROCEDURE — 99292 CRITICAL CARE ADDL 30 MIN: CPT

## 2020-06-13 PROCEDURE — 99291 CRITICAL CARE FIRST HOUR: CPT

## 2020-06-13 RX ORDER — ALTEPLASE 100 MG
2 KIT INTRAVENOUS ONCE
Refills: 0 | Status: COMPLETED | OUTPATIENT
Start: 2020-06-13 | End: 2020-06-14

## 2020-06-13 RX ORDER — PSYLLIUM SEED (WITH DEXTROSE)
1 POWDER (GRAM) ORAL THREE TIMES A DAY
Refills: 0 | Status: DISCONTINUED | OUTPATIENT
Start: 2020-06-13 | End: 2020-06-21

## 2020-06-13 RX ORDER — PANTOPRAZOLE SODIUM 20 MG/1
40 TABLET, DELAYED RELEASE ORAL DAILY
Refills: 0 | Status: DISCONTINUED | OUTPATIENT
Start: 2020-06-13 | End: 2020-06-29

## 2020-06-13 RX ORDER — POTASSIUM CHLORIDE 20 MEQ
20 PACKET (EA) ORAL ONCE
Refills: 0 | Status: COMPLETED | OUTPATIENT
Start: 2020-06-13 | End: 2020-06-14

## 2020-06-13 RX ORDER — FENTANYL CITRATE 50 UG/ML
50 INJECTION INTRAVENOUS
Refills: 0 | Status: DISCONTINUED | OUTPATIENT
Start: 2020-06-13 | End: 2020-06-14

## 2020-06-13 RX ORDER — HUMAN INSULIN 100 [IU]/ML
15 INJECTION, SUSPENSION SUBCUTANEOUS ONCE
Refills: 0 | Status: COMPLETED | OUTPATIENT
Start: 2020-06-13 | End: 2020-06-13

## 2020-06-13 RX ADMIN — LEVETIRACETAM 1000 MILLIGRAM(S): 250 TABLET, FILM COATED ORAL at 06:01

## 2020-06-13 RX ADMIN — ERTAPENEM SODIUM 120 MILLIGRAM(S): 1 INJECTION, POWDER, LYOPHILIZED, FOR SOLUTION INTRAMUSCULAR; INTRAVENOUS at 13:28

## 2020-06-13 RX ADMIN — Medication 2 MILLIGRAM(S): at 21:21

## 2020-06-13 RX ADMIN — FENTANYL CITRATE 50 MICROGRAM(S): 50 INJECTION INTRAVENOUS at 07:50

## 2020-06-13 RX ADMIN — Medication 150 MICROGRAM(S): at 06:00

## 2020-06-13 RX ADMIN — FENTANYL CITRATE 50 MICROGRAM(S): 50 INJECTION INTRAVENOUS at 12:10

## 2020-06-13 RX ADMIN — LEVETIRACETAM 1000 MILLIGRAM(S): 250 TABLET, FILM COATED ORAL at 17:20

## 2020-06-13 RX ADMIN — Medication 2: at 17:20

## 2020-06-13 RX ADMIN — Medication 1 PACKET(S): at 17:19

## 2020-06-13 RX ADMIN — Medication 25 MILLIGRAM(S): at 06:01

## 2020-06-13 RX ADMIN — ENOXAPARIN SODIUM 30 MILLIGRAM(S): 100 INJECTION SUBCUTANEOUS at 06:01

## 2020-06-13 RX ADMIN — SIMVASTATIN 20 MILLIGRAM(S): 20 TABLET, FILM COATED ORAL at 21:21

## 2020-06-13 RX ADMIN — HUMAN INSULIN 40 UNIT(S): 100 INJECTION, SUSPENSION SUBCUTANEOUS at 11:21

## 2020-06-13 RX ADMIN — Medication 2: at 06:04

## 2020-06-13 RX ADMIN — Medication 25 MILLIGRAM(S): at 17:20

## 2020-06-13 RX ADMIN — LACOSAMIDE 200 MILLIGRAM(S): 50 TABLET ORAL at 06:01

## 2020-06-13 RX ADMIN — HUMAN INSULIN 22 UNIT(S): 100 INJECTION, SUSPENSION SUBCUTANEOUS at 17:20

## 2020-06-13 RX ADMIN — HUMAN INSULIN 48 UNIT(S): 100 INJECTION, SUSPENSION SUBCUTANEOUS at 06:03

## 2020-06-13 RX ADMIN — CHLORHEXIDINE GLUCONATE 1 APPLICATION(S): 213 SOLUTION TOPICAL at 21:21

## 2020-06-13 RX ADMIN — CHLORHEXIDINE GLUCONATE 15 MILLILITER(S): 213 SOLUTION TOPICAL at 06:00

## 2020-06-13 RX ADMIN — LACOSAMIDE 200 MILLIGRAM(S): 50 TABLET ORAL at 17:19

## 2020-06-13 RX ADMIN — Medication 2: at 11:21

## 2020-06-13 RX ADMIN — ENOXAPARIN SODIUM 30 MILLIGRAM(S): 100 INJECTION SUBCUTANEOUS at 17:19

## 2020-06-13 RX ADMIN — HUMAN INSULIN 15 UNIT(S): 100 INJECTION, SUSPENSION SUBCUTANEOUS at 23:21

## 2020-06-13 RX ADMIN — PANTOPRAZOLE SODIUM 40 MILLIGRAM(S): 20 TABLET, DELAYED RELEASE ORAL at 11:20

## 2020-06-13 RX ADMIN — Medication 5 MILLIGRAM(S): at 21:21

## 2020-06-13 RX ADMIN — Medication 1 PACKET(S): at 21:21

## 2020-06-13 RX ADMIN — Medication 15 MILLIGRAM(S): at 05:59

## 2020-06-13 RX ADMIN — CHLORHEXIDINE GLUCONATE 15 MILLILITER(S): 213 SOLUTION TOPICAL at 17:20

## 2020-06-13 RX ADMIN — AMLODIPINE BESYLATE 10 MILLIGRAM(S): 2.5 TABLET ORAL at 06:01

## 2020-06-13 NOTE — PROGRESS NOTE ADULT - SUBJECTIVE AND OBJECTIVE BOX
HPI:  65 yo F with AMS since this morning.  Pt. was confused, didn't recognize son this morning.  He notes she was sleeping more than usual yesterday, which tipped off son that something was wrong.  At 11 am pt. was gasping for air like she was seizing. 2 hours ago pt. was dry heaving, her chest was going up and down and she started shaking like she was having a seizure.  Pt. has no history of seizures per son.  		PMH: hypothyroidism, HTN, IDDM, COPD/CHRIS on home O2, RA (on Prednisone), SBO s/p ex lap with lysis of adhesions c/b evisceration,  recent hospitalization in 3/2020 with encephalopathy, significantly hypothyroid (, without myxedema coma), small (stable) right sided SDH, UTI, catatonic reaction to VPA (now resolved) and DKA and psychiatric issues, 2020 hospitalization for GIB, Emphysematous cystitis, endometritis, Sigmoid colitis, morbid obesity, functional quadriplegia    HCP: Norris Rush, Son [phone]; Daughter, Heavenly Schmidt [phone]; son asks that only him or his sister, Heavenly be contacted for patient updates/information as they are both proxy's for patient (29 May 2020 23:41)    OVERNIGHT EVENTS: No acute event overnight    Vital Signs Last 24 Hrs  T(C): 37.1 (2020 04:00), Max: 37.5 (2020 19:00)  T(F): 98.7 (2020 04:00), Max: 99.5 (2020 19:00)  HR: 106 (2020 06:00) (89 - 110)  BP: 114/73 (2020 04:00) (107/64 - 126/72)  BP(mean): 87 (2020 04:00) (77 - 87)  RR: 20 (2020 06:00) (14 - 28)  SpO2: 100% (2020 06:00) (100% - 100%)    I&O's Detail    2020 07:01  -  2020 07:00  --------------------------------------------------------  IN:    Enteral Tube Flush: 170 mL    Glucerna: 1430 mL  Total IN: 1600 mL    OUT:    Incontinent per Collection Ba mL    Rectal Tube: 600 mL  Total OUT: 1750 mL    Total NET: -150 mL        I&O's Summary    2020 07:01  -  2020 07:00  --------------------------------------------------------  IN: 1600 mL / OUT: 1750 mL / NET: -150 mL        PHYSICAL EXAM:  Neurological:  Intubated, EO to noxious stimuli, dysconjugated gaze, pupils 3mm reactive, +cough/gag, upper extremity tremors when stimulated, LLE TF  Cardiovascular: +s1, s2  Respiratory: clear to auscultation b/l  Gastrointestinal: soft, non-distended, non-tender  Extremities: warm and dry        LABS:                        8.5    9.51  )-----------( 468      ( 2020 22:24 )             27.7     06-11    149<H>  |  109<H>  |  29<H>  ----------------------------<  107<H>  4.0   |  25  |  0.58    Ca    9.2      2020 22:24  Phos  3.8     06-11  Mg     2.2     06-11              CAPILLARY BLOOD GLUCOSE      POCT Blood Glucose.: 196 mg/dL (2020 05:04)  POCT Blood Glucose.: 147 mg/dL (2020 23:36)  POCT Blood Glucose.: 140 mg/dL (2020 17:14)  POCT Blood Glucose.: 113 mg/dL (2020 11:57)      Drug Levels: [] N/A    CSF Analysis: [] N/A      Allergies    Depakote (Other)  Seroquel (Other (Severe))    Intolerances      MEDICATIONS:  Antibiotics:  ertapenem  IVPB 1000 milliGRAM(s) IV Intermittent <User Schedule>    Neuro:  acetaminophen    Suspension .. 650 milliGRAM(s) Oral every 6 hours PRN  lacosamide Solution 200 milliGRAM(s) Oral two times a day  levETIRAcetam  Solution 1000 milliGRAM(s) Oral two times a day    Anticoagulation:  enoxaparin Injectable 30 milliGRAM(s) SubCutaneous two times a day    OTHER:  amLODIPine   Tablet 10 milliGRAM(s) Oral daily  chlorhexidine 0.12% Liquid 15 milliLiter(s) Oral Mucosa every 12 hours  chlorhexidine 4% Liquid 1 Application(s) Topical <User Schedule>  dextrose 40% Gel 15 Gram(s) Oral once PRN  dextrose 50% Injectable 12.5 Gram(s) IV Push once  dextrose 50% Injectable 25 Gram(s) IV Push once  dextrose 50% Injectable 25 Gram(s) IV Push once  doxazosin 2 milliGRAM(s) Oral at bedtime  glucagon  Injectable 1 milliGRAM(s) IntraMuscular once PRN  insulin lispro (HumaLOG) corrective regimen sliding scale   SubCutaneous every 6 hours  insulin NPH human recombinant 48 Unit(s) SubCutaneous <User Schedule>  insulin NPH human recombinant 40 Unit(s) SubCutaneous <User Schedule>  insulin NPH human recombinant 40 Unit(s) SubCutaneous <User Schedule>  insulin NPH human recombinant 22 Unit(s) SubCutaneous <User Schedule>  levothyroxine 150 MICROGram(s) Oral daily  metoprolol tartrate 25 milliGRAM(s) Oral two times a day  pantoprazole  Injectable 40 milliGRAM(s) IV Push daily  predniSONE   Tablet 15 milliGRAM(s) Oral <User Schedule>  predniSONE   Tablet 5 milliGRAM(s) Oral at bedtime  simvastatin 20 milliGRAM(s) Oral at bedtime    IVF:  dextrose 5%. 1000 milliLiter(s) IV Continuous <Continuous>    CULTURES:  Culture Results:   >100,000 CFU/ml Escherichia coli ESBL ( @ 01:09)  Culture Results:   No Growth Final ( @ 15:00)    RADIOLOGY & ADDITIONAL TESTS: HPI:  63 yo F with AMS since this morning.  Pt. was confused, didn't recognize son this morning.  He notes she was sleeping more than usual yesterday, which tipped off son that something was wrong.  At 11 am pt. was gasping for air like she was seizing. 2 hours ago pt. was dry heaving, her chest was going up and down and she started shaking like she was having a seizure.  Pt. has no history of seizures per son.  		PMH: hypothyroidism, HTN, IDDM, COPD/CHRIS on home O2, RA (on Prednisone), SBO s/p ex lap with lysis of adhesions c/b evisceration,  recent hospitalization in 3/2020 with encephalopathy, significantly hypothyroid (, without myxedema coma), small (stable) right sided SDH, UTI, catatonic reaction to VPA (now resolved) and DKA and psychiatric issues, 5/2020 hospitalization for GIB, Emphysematous cystitis, endometritis, Sigmoid colitis, morbid obesity, functional quadriplegi  HCP: Norris Rush, Son [phone]; Daughter, Heavenly Schmidt [phone]; son asks that only him or his sister, Heavenly be contacted for patient updates/information as they are both proxy's for patient (29 May 2020 23:41)    OVERNIGHT EVENTS: No acute event overnight.      ICU Vital Signs Last 24 Hrs  T(C): 37.3 (13 Jun 2020 11:00), Max: 37.5 (12 Jun 2020 19:00)  T(F): 99.2 (13 Jun 2020 11:00), Max: 99.5 (12 Jun 2020 19:00)  HR: 100 (13 Jun 2020 12:20) (100 - 110)  BP: 113/69 (13 Jun 2020 11:00) (107/64 - 126/72)  BP(mean): 83 (13 Jun 2020 11:00) (77 - 88)  ABP: 118/71 (12 Jun 2020 20:00) (118/71 - 134/85)  ABP(mean): 85 (12 Jun 2020 20:00) (85 - 102)  RR: 17 (13 Jun 2020 11:00) (14 - 28)  SpO2: 100% (13 Jun 2020 12:20) (100% - 100%)      06-12-20 @ 07:01  -  06-13-20 @ 07:00  --------------------------------------------------------  IN: 1600 mL / OUT: 1750 mL / NET: -150 mL    06-13-20 @ 07:01  -  06-13-20 @ 12:49  --------------------------------------------------------  IN: 325 mL / OUT: 0 mL / NET: 325 mL        Mode: AC/ CMV (Assist Control/ Continuous Mandatory Ventilation), RR (machine): 14, TV (machine): 450, FiO2: 40, PEEP: 5, ITime: 0.8, MAP: 10, PIP: 25    acetaminophen    Suspension .. 650 milliGRAM(s) Oral every 6 hours PRN  amLODIPine   Tablet 10 milliGRAM(s) Oral daily  chlorhexidine 0.12% Liquid 15 milliLiter(s) Oral Mucosa every 12 hours  chlorhexidine 4% Liquid 1 Application(s) Topical <User Schedule>  dextrose 40% Gel 15 Gram(s) Oral once PRN  dextrose 5%. 1000 milliLiter(s) (50 mL/Hr) IV Continuous <Continuous>  dextrose 50% Injectable 12.5 Gram(s) IV Push once  dextrose 50% Injectable 25 Gram(s) IV Push once  dextrose 50% Injectable 25 Gram(s) IV Push once  doxazosin 2 milliGRAM(s) Oral at bedtime  enoxaparin Injectable 30 milliGRAM(s) SubCutaneous two times a day  ertapenem  IVPB 1000 milliGRAM(s) IV Intermittent <User Schedule>  fentaNYL    Injectable 50 MICROGram(s) IV Push every 2 hours PRN  glucagon  Injectable 1 milliGRAM(s) IntraMuscular once PRN  insulin lispro (HumaLOG) corrective regimen sliding scale   SubCutaneous every 6 hours  insulin NPH human recombinant 48 Unit(s) SubCutaneous <User Schedule>  insulin NPH human recombinant 40 Unit(s) SubCutaneous <User Schedule>  insulin NPH human recombinant 40 Unit(s) SubCutaneous <User Schedule>  insulin NPH human recombinant 22 Unit(s) SubCutaneous <User Schedule>  lacosamide Solution 200 milliGRAM(s) Oral two times a day  levETIRAcetam  Solution 1000 milliGRAM(s) Oral two times a day  levothyroxine 150 MICROGram(s) Oral daily  metoprolol tartrate 25 milliGRAM(s) Oral two times a day  pantoprazole  Injectable 40 milliGRAM(s) IV Push daily  predniSONE   Tablet 15 milliGRAM(s) Oral <User Schedule>  predniSONE   Tablet 5 milliGRAM(s) Oral at bedtime  simvastatin 20 milliGRAM(s) Oral at bedtime  sodium chloride 0.9% lock flush 10 milliLiter(s) IV Push every 1 hour PRN      LABS:  Na: 149 (06-11 @ 22:24)  K: 4.0 (06-11 @ 22:24)  Cl: 109 (06-11 @ 22:24)  CO2: 25 (06-11 @ 22:24)  BUN: 29 (06-11 @ 22:24)  Cr: 0.58 (06-11 @ 22:24)  Glu: 107(06-11 @ 22:24)    Hgb: 8.5 (06-11 @ 22:24)  Hct: 27.7 (06-11 @ 22:24)  WBC: 9.51 (06-11 @ 22:24)  Plt: 468 (06-11 @ 22:24)    INR:   PTT:     CAPILLARY BLOOD GLUCOSE  POCT Blood Glucose.: 196 mg/dL (13 Jun 2020 05:04)  POCT Blood Glucose.: 147 mg/dL (12 Jun 2020 23:36)  POCT Blood Glucose.: 140 mg/dL (12 Jun 2020 17:14)  POCT Blood Glucose.: 113 mg/dL (12 Jun 2020 11:57)      PHYSICAL EXAM:  Neurological:  Intubated, EO to noxious stimuli, dysconjugate gaze, pupils 3mm reactive, +cough/gag, upper extremity tremors when stimulated, LLE TF  Cardiovascular: +s1, s2  Respiratory: clear to auscultation b/l  Gastrointestinal: soft, non-distended, non-tender  Extremities: warm and dry

## 2020-06-13 NOTE — PROGRESS NOTE ADULT - SUBJECTIVE AND OBJECTIVE BOX
HPI:  63 yo F with AMS since this morning.  Pt. was confused, didn't recognize son this morning.  He notes she was sleeping more than usual yesterday, which tipped off son that something was wrong.  At 11 am pt. was gasping for air like she was seizing. 2 hours ago pt. was dry heaving, her chest was going up and down and she started shaking like she was having a seizure.  Pt. has no history of seizures per son.    PMH: hypothyroidism, HTN, IDDM, COPD/CHRIS on home O2, RA (on Prednisone), SBO s/p ex lap with lysis of adhesions c/b evisceration,  recent hospitalization in 3/2020 with encephalopathy, significantly hypothyroid (, without myxedema coma), small (stable) right sided SDH, UTI, catatonic reaction to VPA (now resolved) and DKA and psychiatric issues, 5/2020 hospitalization for GIB, Emphysematous cystitis, endometritis, Sigmoid colitis, morbid obesity, functional quadriplegic  HCP: Norris Rush, Son [phone]; Daughter, Heavenly Schmidt [phone]; son asks that only him or his sister, Heavenly be contacted for patient updates/information as they are both proxy's for patient (29 May 2020 23:41)    OVERNIGHT EVENTS: No acute event overnight.      ICU Vital Signs  ICU Vital Signs Last 24 Hrs  T(C): 37.2 (13 Jun 2020 15:00), Max: 37.5 (12 Jun 2020 19:00)  T(F): 99 (13 Jun 2020 15:00), Max: 99.5 (12 Jun 2020 19:00)  HR: 108 (13 Jun 2020 16:53) (100 - 110)  BP: 122/77 (13 Jun 2020 15:00) (107/64 - 126/72)  BP(mean): 88 (13 Jun 2020 15:00) (77 - 88)  ABP: 118/71 (12 Jun 2020 20:00) (118/71 - 118/71)  ABP(mean): 85 (12 Jun 2020 20:00) (85 - 85)  RR: 19 (13 Jun 2020 15:00) (16 - 28)  SpO2: 100% (13 Jun 2020 16:53) (100% - 100%)        Mode: Mode: AC/ CMV (Assist Control/ Continuous Mandatory Ventilation)  RR (machine): 14  TV (machine): 450  FiO2: 40  PEEP: 5  ITime: 0.8  MAP: 10  PIP: 22      Home Medications:  aspirin 81 mg oral delayed release tablet: 1 tab(s) orally once a day (01 Jun 2020 17:46)  azithromycin 250 mg oral tablet: 250 milligram(s) orally 3 times a week   (01 Jun 2020 17:46)  azithromycin 250 mg oral tablet: 1 tab(s) orally 3 times a week M,W,F for COPD exacerbation prevention  (01 Jun 2020 17:46)  cetirizine 5 mg oral tablet: 1 tab(s) orally once a day for allergies (01 Jun 2020 17:46)  gabapentin 300 mg oral capsule: 1 cap(s) orally 3 times a day for neuropathy - fill 4/25 (01 Jun 2020 17:46)  Januvia 50 mg oral tablet: 1 tab(s) orally once a day - last fill 3/23/20 (01 Jun 2020 17:46)  Lantus 100 units/mL subcutaneous solution: 20 unit(s) subcutaneous once a day (at bedtime) - filled 3/20 (01 Jun 2020 17:46)  Lasix 40 mg oral tablet: 1 tab(s) orally 2 times a day per sonJennifer Banuelos has daily filled 12/2019 for urinary retention  (01 Jun 2020 17:46)  levothyroxine 175 mcg (0.175 mg) oral tablet: 1 tab(s) orally once a day - 4/22 (01 Jun 2020 17:46)  metoprolol succinate 50 mg oral tablet, extended release: 1 tab(s) orally once a day - fill 5/3 (01 Jun 2020 17:46)  naproxen 250 mg oral tablet: 1 tab(s) orally 2 times a day for RA (01 Jun 2020 17:46)  NovoLOG 100 units/mL subcutaneous solution: sliding scale (01 Jun 2020 17:46)  OLANZapine 5 mg oral tablet: 1 tab(s) orally once a day (at bedtime) x agitation (01 Jun 2020 17:46)  Plavix 75 mg oral tablet: 1 tab(s) orally once a day - last fill 4/25 (01 Jun 2020 17:46)  predniSONE 5 mg oral tablet: 3 tab(s) orally in the morning and 1 tablet at night time for RA (01 Jun 2020 16:07)  primidone 50 mg oral tablet: 1 tab(s) orally once a day (at bedtime) - last fill 3/20 for essential tremor  (01 Jun 2020 17:46)  Protonix 40 mg oral delayed release tablet: 1 tab(s) orally once a day - filled 3/20 (01 Jun 2020 17:46)  raNITIdine 300 mg oral capsule: 1 cap(s) orally once a day (at bedtime)  (01 Jun 2020 17:46)  Symbicort 160 mcg-4.5 mcg/inh inhalation aerosol: 2 puff(s) inhaled 2 times a day (01 Jun 2020 17:46)  traMADol 300 mg/24 hours oral tablet, extended release: 1 tab(s) orally once a daily per [facility]. BID per son filled 5/2020 (01 Jun 2020 17:46)  Zocor 20 mg oral tablet: 1 tab(s) orally once a day (at bedtime) - filled 4/25 (01 Jun 2020 17:46)      LABS:    PHYSICAL EXAM:  Neurological:  Intubated, EO to noxious stimuli, no gaze preference/dysconjugate, pupils 3 mm reactive b/l, no FC, overbreathing, +cough/gag, UE tremor when stimulated, ROMAN TF            Diet: Glucerna 1.2 OGT feeds HPI:  65 yo F with AMS since this morning.  Pt. was confused, didn't recognize son this morning.  He notes she was sleeping more than usual yesterday, which tipped off son that something was wrong.  At 11 am pt. was gasping for air like she was seizing. 2 hours ago pt. was dry heaving, her chest was going up and down and she started shaking like she was having a seizure.  Pt. has no history of seizures per son.    PMH: hypothyroidism, HTN, IDDM, COPD/CHRIS on home O2, RA (on Prednisone), SBO s/p ex lap with lysis of adhesions c/b evisceration,  recent hospitalization in 3/2020 with encephalopathy, significantly hypothyroid (, without myxedema coma), small (stable) right sided SDH, UTI, catatonic reaction to VPA (now resolved) and DKA and psychiatric issues, 5/2020 hospitalization for GIB, Emphysematous cystitis, endometritis, Sigmoid colitis, morbid obesity, functional quadriplegic  HCP: Norris Rush, Son [phone]; Daughter, Heavenly Schmidt [phone]; son asks that only him or his sister, Heavenly be contacted for patient updates/information as they are both proxy's for patient.  (29 May 2020 23:41)    OVERNIGHT EVENTS: No acute event overnight.      ICU Vital Signs  ICU Vital Signs Last 24 Hrs  T(C): 37.2 (13 Jun 2020 19:00), Max: 37.4 (13 Jun 2020 07:00)  T(F): 99 (13 Jun 2020 19:00), Max: 99.3 (13 Jun 2020 07:00)  HR: 102 (13 Jun 2020 20:38) (96 - 110)  BP: 117/98 (13 Jun 2020 19:00) (107/64 - 125/64)  BP(mean): 105 (13 Jun 2020 19:00) (77 - 105)  ABP: --  ABP(mean): --  RR: 18 (13 Jun 2020 19:00) (16 - 28)  SpO2: 100% (13 Jun 2020 20:38) (100% - 100%)    Mode: Mode: AC/ CMV (Assist Control/ Continuous Mandatory Ventilation)  RR (machine): 14  TV (machine): 450  FiO2: 40  PEEP: 5  ITime: 0.8  MAP: 10  PIP: 22    Home Medications:  aspirin 81 mg oral delayed release tablet: 1 tab(s) orally once a day (01 Jun 2020 17:46)  azithromycin 250 mg oral tablet: 250 milligram(s) orally 3 times a week   (01 Jun 2020 17:46)  azithromycin 250 mg oral tablet: 1 tab(s) orally 3 times a week M,W,F for COPD exacerbation prevention  (01 Jun 2020 17:46)  cetirizine 5 mg oral tablet: 1 tab(s) orally once a day for allergies (01 Jun 2020 17:46)  gabapentin 300 mg oral capsule: 1 cap(s) orally 3 times a day for neuropathy - fill 4/25 (01 Jun 2020 17:46)  Januvia 50 mg oral tablet: 1 tab(s) orally once a day - last fill 3/23/20 (01 Jun 2020 17:46)  Lantus 100 units/mL subcutaneous solution: 20 unit(s) subcutaneous once a day (at bedtime) - filled 3/20 (01 Jun 2020 17:46)  Lasix 40 mg oral tablet: 1 tab(s) orally 2 times a day per sonJennifer Banuelos has daily filled 12/2019 for urinary retention  (01 Jun 2020 17:46)  levothyroxine 175 mcg (0.175 mg) oral tablet: 1 tab(s) orally once a day - 4/22 (01 Jun 2020 17:46)  metoprolol succinate 50 mg oral tablet, extended release: 1 tab(s) orally once a day - fill 5/3 (01 Jun 2020 17:46)  naproxen 250 mg oral tablet: 1 tab(s) orally 2 times a day for RA (01 Jun 2020 17:46)  NovoLOG 100 units/mL subcutaneous solution: sliding scale (01 Jun 2020 17:46)  OLANZapine 5 mg oral tablet: 1 tab(s) orally once a day (at bedtime) x agitation (01 Jun 2020 17:46)  Plavix 75 mg oral tablet: 1 tab(s) orally once a day - last fill 4/25 (01 Jun 2020 17:46)  predniSONE 5 mg oral tablet: 3 tab(s) orally in the morning and 1 tablet at night time for RA (01 Jun 2020 16:07)  primidone 50 mg oral tablet: 1 tab(s) orally once a day (at bedtime) - last fill 3/20 for essential tremor  (01 Jun 2020 17:46)  Protonix 40 mg oral delayed release tablet: 1 tab(s) orally once a day - filled 3/20 (01 Jun 2020 17:46)  raNITIdine 300 mg oral capsule: 1 cap(s) orally once a day (at bedtime)  (01 Jun 2020 17:46)  Symbicort 160 mcg-4.5 mcg/inh inhalation aerosol: 2 puff(s) inhaled 2 times a day (01 Jun 2020 17:46)  traMADol 300 mg/24 hours oral tablet, extended release: 1 tab(s) orally once a daily per [facility]. BID per son filled 5/2020 (01 Jun 2020 17:46)  Zocor 20 mg oral tablet: 1 tab(s) orally once a day (at bedtime) - filled 4/25 (01 Jun 2020 17:46)      LABS:                        8.5    9.51  )-----------( 468      ( 11 Jun 2020 22:24 )             27.7   Basic Metabolic Panel - STAT (06.11.20 @ 22:24)    Sodium, Serum: 149 mmol/L    Potassium, Serum: 4.0 mmol/L    Chloride, Serum: 109 mmol/L    Carbon Dioxide, Serum: 25 mmol/L    Anion Gap, Serum: 15 mmol/L    Blood Urea Nitrogen, Serum: 29 mg/dL    Creatinine, Serum: 0.58 mg/dL    Glucose, Serum: 107 mg/dL    Calcium, Total Serum: 9.2 mg/dL    eGFR if Non : 99: Interpretative comment      PHYSICAL EXAM:  Neurological:  Intubated, EO to noxious stimuli, no gaze preference/dysconjugate, pupils 3 mm reactive b/l, no FC, overbreathing, +cough/gag, UE tremor when stimulated, ROMAN TF            Diet: Glucerna 1.2 OGT feeds HPI:  63 yo F with AMS since this morning.  Pt. was confused, didn't recognize son this morning.  He notes she was sleeping more than usual yesterday, which tipped off son that something was wrong.  At 11 am pt. was gasping for air like she was seizing. 2 hours ago pt. was dry heaving, her chest was going up and down and she started shaking like she was having a seizure.  Pt. has no history of seizures per son.    PMH: hypothyroidism, HTN, IDDM, COPD/CHRIS on home O2, RA (on Prednisone), SBO s/p ex lap with lysis of adhesions c/b evisceration,  recent hospitalization in 3/2020 with encephalopathy, significantly hypothyroid (, without myxedema coma), small (stable) right sided SDH, UTI, catatonic reaction to VPA (now resolved) and DKA and psychiatric issues, 5/2020 hospitalization for GIB, Emphysematous cystitis, endometritis, Sigmoid colitis, morbid obesity, functional quadriplegic  HCP: Norris Rush, Son [phone]; Daughter, Heavenly Schmidt [phone]; son asks that only him or his sister, Heavenly be contacted for patient updates/information as they are both proxy's for patient.  (29 May 2020 23:41)    OVERNIGHT EVENTS: No acute event overnight.      ICU Vital Signs Last 24 Hrs  T(C): 37.1 (13 Jun 2020 23:00), Max: 37.4 (13 Jun 2020 07:00)  T(F): 98.7 (13 Jun 2020 23:00), Max: 99.3 (13 Jun 2020 07:00)  HR: 103 (14 Jun 2020 00:21) (96 - 108)  BP: 120/72 (13 Jun 2020 23:00) (113/69 - 122/77)  BP(mean): 84 (13 Jun 2020 23:00) (83 - 105)  ABP: --  ABP(mean): --  RR: 16 (13 Jun 2020 23:00) (16 - 28)  SpO2: 100% (14 Jun 2020 00:21) (100% - 100%)    Mode: Mode: AC/ CMV (Assist Control/ Continuous Mandatory Ventilation)  RR (machine): 14  TV (machine): 450  FiO2: 40  PEEP: 5  ITime: 0.8  MAP: 10  PIP: 22    MEDICATIONS  (STANDING):  alteplase for catheter clearance 2 milliGRAM(s) Catheter once  amLODIPine   Tablet 10 milliGRAM(s) Oral daily  chlorhexidine 0.12% Liquid 15 milliLiter(s) Oral Mucosa every 12 hours  chlorhexidine 4% Liquid 1 Application(s) Topical <User Schedule>  dextrose 5%. 1000 milliLiter(s) (50 mL/Hr) IV Continuous <Continuous>  dextrose 50% Injectable 25 Gram(s) IV Push once  doxazosin 2 milliGRAM(s) Oral at bedtime  enoxaparin Injectable 30 milliGRAM(s) SubCutaneous two times a day  insulin lispro (HumaLOG) corrective regimen sliding scale   SubCutaneous every 6 hours  insulin NPH human recombinant 48 Unit(s) SubCutaneous <User Schedule>  insulin NPH human recombinant 40 Unit(s) SubCutaneous <User Schedule>  insulin NPH human recombinant 40 Unit(s) SubCutaneous <User Schedule>  insulin NPH human recombinant 22 Unit(s) SubCutaneous <User Schedule>  lacosamide Solution 200 milliGRAM(s) Oral two times a day  levETIRAcetam  Solution 1000 milliGRAM(s) Oral two times a day  levothyroxine 150 MICROGram(s) Oral daily  metoprolol tartrate 25 milliGRAM(s) Oral every 8 hours  pantoprazole   Suspension 40 milliGRAM(s) Oral daily  potassium chloride   Solution 20 milliEquivalent(s) Oral once  predniSONE   Tablet 15 milliGRAM(s) Oral <User Schedule>  predniSONE   Tablet 5 milliGRAM(s) Oral at bedtime  psyllium Powder 1 Packet(s) Oral three times a day  simvastatin 20 milliGRAM(s) Oral at bedtime    MEDICATIONS  (PRN):  acetaminophen    Suspension .. 650 milliGRAM(s) Oral every 6 hours PRN Temp greater or equal to 38C (100.4F), Mild Pain (1 - 3)  fentaNYL    Injectable 50 MICROGram(s) IV Push every 2 hours PRN Severe Pain (7 - 10)  sodium chloride 0.9% lock flush 10 milliLiter(s) IV Push every 1 hour PRN Pre/post blood products, medications, blood draw, and to maintain line patency    LABS:                                   8.6    8.33  )-----------( 482      ( 13 Jun 2020 22:33 )             28.4   Basic Metabolic Panel - STAT (06.13.20 @ 22:33)    Sodium, Serum: 144 mmol/L    Potassium, Serum: 3.8 mmol/L    Chloride, Serum: 107 mmol/L    Carbon Dioxide, Serum: 25 mmol/L    Anion Gap, Serum: 12 mmol/L    Blood Urea Nitrogen, Serum: 23 mg/dL    Creatinine, Serum: 0.55 mg/dL    Glucose, Serum: 88 mg/dL    Calcium, Total Serum: 9.3 mg/dL        PHYSICAL EXAM:  Neurological:  Intubated, EO to noxious stimuli, no gaze preference/dysconjugate, pupils 3 mm reactive b/l, no FC, overbreathing, +cough/gag, UE tremor when stimulated, ROMAN TF        Diet: Glucerna 1.2 OGT feeds

## 2020-06-13 NOTE — PROGRESS NOTE ADULT - ASSESSMENT
ASSESSMENT:  Left frontal intracerebral hemorrhage, generalized periodic discharges, UTI.       NEURO:  Q4 neurochecks  Cont Keppra and Vimpat for GPDs/risk for seizures  Pain management with Tylenol & fentanyl prn  Given previous psych history and workup not revealing any cause, NMDA encephalitis testing sent -> NMDA AB neg.    Trach and PEG being discussed with family.   Activity: [] OOB as tolerated [] Bedrest [X] PT [X] OT [] PMNR      PULM:  Intubated since 5/29- Day 14 of intubation.   H/O of COPD   CPAP as tolerated. Has failed CPAP in the past   CPAP today. Will need trach if family desires.  Mild hypoxemia- likely atelectasis. Monitor.  CXR 6/12 clear.      CV:  Keep -160  TTE negative for PFO  HTN: Continue norvasc and metoprolol (home med)   Statin for HLD    D/Rob  [place].       RENAL:  f/u labs tonight; replete electrolytes as needed  IVL. Monitor Is/Os  Female iraheta  Start low dose cardura re: urine retention    GI:  Diet: On Glucerna OGT feeds  Last BM 6/12. Banana flakes. Rectal tube.   GI prophylaxis [] not indicated [X] PPI [] other:        ENDO:   Endo Following for adjustment of NPH; will follow up sugars tonight  Better control  Continue PO synthroid  Continue prednisone 15mg, 5 mg at bedtime (was on it at home for RA). Likely etiology of bruising  Goal euglycemia (-180)      HEME/ONC:  On SQL.  VTE prophylaxis: [X] SCDs [X] chemoprophylaxis [] high risk of DVT/PE on admission due to:  LE doppler 5/30- No DVT  Anemia- improved.      ID:  Afebrile   Ertapnem for ESBL x 7 days until 6/14      MISC: Palliative care consulted. Previous multiple family meetings- yield no decision  Family meeting was scheduled for 6/12 with daughter Heavenly, son (Norris), SW, Ethics and palliative care. However, son Norris stated that he could not get all family members to be a part of meeting and requested for it to be postponed until next Tuesday, 6/16      SOCIAL/FAMILY:  [X] updated family via phone  [] family meeting    CODE STATUS:  [X] Full Code [] DNR [] DNI [] Palliative/Comfort Care    DISPOSITION:  [X] ICU [] Stroke Unit [] Floor [] EMU [] RCU [] PCU    [X] Patient is at high risk of neurologic deterioration/death due to: resp failure, seizures, hemorrhage.     Time spent:  35 critical care minutes ASSESSMENT:  Left frontal intracerebral hemorrhage, generalized periodic discharges, UTI.       NEURO:  Neuro q4h checks  seizure management: Keppra and Vimpat  Pain management with Tylenol & fentanyl prn  Given previous psych history and workup not revealing any cause, NMDA encephalitis testing sent -> NMDA AB neg.    Trach and PEG being discussed with family.   Activity: [] OOB as tolerated [] Bedrest [X] PT [X] OT [] PMNR      PULM:  Intubated since 5/29- Day 14 of intubation.   H/O of COPD   CPAP as tolerated. Has failed CPAP in the past   Mild hypoxemia- likely atelectasis; will monitor      CV:  Keep -160  TTE negative for PFO  HTN: Continue norvasc and metoprolol (home med)   Statin for HLD           RENAL:  f/u labs tonight; replete electrolytes as needed  IVL. Monitor Is/Os  Female iraheta  on cardura for urinary retention      GI:  Diet: On Glucerna OGT feeds  Last BM 6/12. Banana flakes. Rectal tube.   GI prophylaxis [] not indicated [X] PO PPI [] other:        ENDO:   Endo Following for adjustment of NPH; will follow up sugars tonight  Continue PO synthroid  Continue prednisone 15mg, 5 mg at bedtime (was on it at home for RA). Likely etiology of bruising  Goal euglycemia (-180)      HEME/ONC:  On SQL.  VTE prophylaxis: [X] SCDs [X] chemoprophylaxis [X] high risk of DVT/PE on admission due to: bed bound at baseline  LE doppler 5/30- No DVT        ID:  Afebrile   Ertapnem for ESBL found growing in urine culture on 6/4 - will end 6/14      MISC: Palliative care consulted. Previous multiple family meetings- yield no decision. Ethics following  Family meeting was scheduled for 6/12 with daughter Heavenly, son (Norris), BHAVIN, Ethics and palliative care. However, son Norris stated that he could not get all family members to be a part of meeting and requested for it to be postponed until next Tuesday, 6/16      SOCIAL/FAMILY:  [X] updated family via phone  [] family meeting    CODE STATUS:  [X] Full Code [] DNR [] DNI [] Palliative/Comfort Care    DISPOSITION:  [X] ICU [] Stroke Unit [] Floor [] EMU [] RCU [] PCU    [X] Patient is at high risk of neurologic deterioration/death due to: resp failure, seizures, hemorrhage.     Time spent:  35 critical care minutes ASSESSMENT:  Left frontal intracerebral hemorrhage, generalized periodic discharges, UTI.       NEURO:  Neuro q4h checks  seizure management: Keppra and Vimpat  Pain management with Tylenol & fentanyl prn  Given previous psych history and workup not revealing any cause, NMDA encephalitis testing sent -> NMDA AB neg.    Trach and PEG being discussed with family.   Activity: [] OOB as tolerated [] Bedrest [X] PT [X] OT [] PMNR      PULM:  Intubated since 5/29- Day 14 of intubation.   H/O of COPD   CPAP as tolerated. Has failed CPAP in the past   Mild hypoxemia- likely atelectasis; will monitor      CV:  Keep -160  TTE negative for PFO  HTN: Continue norvasc and metoprolol (home med)   increased metoprolol to 25 q8h   Statin for HLD           RENAL:  f/u labs tonight; replete electrolytes as needed  IVL. Monitor Is/Os  Female iraheta  on cardura for urinary retention      GI:  Diet: On Glucerna OGT feeds  Last BM 6/12. Banana flakes. Rectal tube.   GI prophylaxis [] not indicated [X] PO PPI [] other:        ENDO:   Endo Following for adjustment of NPH; will follow up sugars tonight  Continue PO synthroid  Continue prednisone 15mg, 5 mg at bedtime (was on it at home for RA). Likely etiology of bruising  Goal euglycemia (-180)      HEME/ONC:  On SQL.  VTE prophylaxis: [X] SCDs [X] chemoprophylaxis [X] high risk of DVT/PE on admission due to: bed bound at baseline  LE doppler 5/30- No DVT        ID:  Afebrile   Ertapnem for ESBL found growing in urine culture on 6/4 - will end 6/14      MISC: Palliative care consulted. Previous multiple family meetings- yield no decision. Ethics following  Family meeting was scheduled for 6/12 with daughter Heavenly, son (Norris), SW, Ethics and palliative care. However, son Norris stated that he could not get all family members to be a part of meeting and requested for it to be postponed until next Tuesday, 6/16      SOCIAL/FAMILY:  [X] updated family via phone  [] family meeting    CODE STATUS:  [X] Full Code [] DNR [] DNI [] Palliative/Comfort Care    DISPOSITION:  [X] ICU [] Stroke Unit [] Floor [] EMU [] RCU [] PCU    [X] Patient is at high risk of neurologic deterioration/death due to: resp failure, seizures, hemorrhage.     Time spent:  35 critical care minutes ASSESSMENT:  Left frontal intracerebral hemorrhage, generalized periodic discharges, UTI.       NEURO:  Neuro q4h checks  seizure management: Keppra and Vimpat  Pain management with Tylenol & fentanyl prn  Given previous psych history and workup not revealing any cause, NMDA encephalitis testing sent -> NMDA AB neg.    Trach and PEG being discussed with family.   Activity: [] OOB as tolerated [] Bedrest [X] PT [X] OT [] PMNR      PULM:  Intubated since 5/29- Day 14 of intubation.   H/O of COPD   CPAP as tolerated. Has failed CPAP in the past   Mild hypoxemia- likely atelectasis; will monitor      CV:  Keep -160  TTE negative for PFO  HTN: Continue norvasc and metoprolol (home med)   increased metoprolol to 25 q8h due to sinus tachycardia during the day (-108)  Statin for HLD           RENAL:  f/u labs tonight; replete electrolytes as needed  IVL. Monitor Is/Os  Female iraheta  on cardura for urinary retention      GI:  Diet: On Glucerna OGT feeds  Last BM 6/12. Banana flakes. Rectal tube.   GI prophylaxis [] not indicated [X] PO PPI [] other:        ENDO:   Endo Following for adjustment of NPH; will follow up sugars tonight  Continue PO synthroid  Continue prednisone 15mg, 5 mg at bedtime (was on it at home for RA). Likely etiology of bruising  Goal euglycemia (-180)      HEME/ONC:  On SQL for DVT ppx  VTE prophylaxis: [X] SCDs [X] chemoprophylaxis [X] high risk of DVT/PE on admission due to: bed bound at baseline  LE doppler 5/30- No DVT        ID:  Afebrile   Ertapnem for ESBL found growing in urine culture on 6/4 - will end 6/14      MISC: Palliative care consulted. Previous multiple family meetings- yield no decision. Ethics following  Family meeting was scheduled for 6/12 with daughter Heavenly, son (Norris), BHAVIN, Ethics and palliative care. However, son Norris stated that he could not get all family members to be a part of meeting and requested for it to be postponed until next Tuesday, 6/16      SOCIAL/FAMILY:  [X] updated family via phone  [] family meeting    CODE STATUS:  [X] Full Code [] DNR [] DNI [] Palliative/Comfort Care    DISPOSITION:  [X] ICU [] Stroke Unit [] Floor [] EMU [] RCU [] PCU    [X] Patient is at high risk of neurologic deterioration/death due to: resp failure, seizures, hemorrhage.     Time spent:  35 critical care minutes

## 2020-06-13 NOTE — PROGRESS NOTE ADULT - ASSESSMENT
ASSESSMENT:  Left frontal intracerebral hemorrhage, generalized periodic discharges, UTI.     NEURO:  Q4 neurochecks  Cont Keppra and Vimpat for GPDs/risk for seizures  Pain management with Tylenol.   Given previous psych history and workup not revealing any cause, NMDA encephalitis testing sent -> NMDA AB neg.    Trach and PEG being discussed with family.   Activity: [] OOB as tolerated [] Bedrest [X] PT [X] OT [] PMNR  Baseline mRS 4      PULM:  Intubated since 5/29- Day 14 of intubation.   History of COPD   CPAP as tolerated. Failed cpap this AM. Will need trach if family desires.  Mild hypoxemia- likely atelectasis. Monitor.  CXR 6/12 clear.      CV:  Keep -160  TTE negative for PFO  HTN: Continue norvasc and metoprolol    Statin for HLD    D/C [place].       RENAL:  Na 149.   IVL.   Monitor Is/Os  Female iraheta. Bladder scan.  Start low dose cardura re: urine retention    GI:  Diet: On Glucerna Tube feeds  Last BM 6/12. Banana flakes. Rectal tube.   GI prophylaxis [] not indicated [X] PPI [] other:  Change PPI Iv to PO      ENDO:   Endo Following - adjusting NPH. Scheduled doses by endo.   Better control  Continue synthroid. Change to PO  Continue prednisone 15mg, 5 mg at bedtime (was on it at home for RA). Likely etiology of bruising  Goal euglycemia (-180)      HEME/ONC:  On SQL.  VTE prophylaxis: [X] SCDs [X] chemoprophylaxis [] high risk of DVT/PE on admission due to:  LE doppler 5/30- No DVT  Anemia- improved.    ID:  Afebrile   Ertapnem for ESBL x 7 days until 6/14      MISC: Palliative care consulted. Family meeting on 6/12 with daughter Heavenly, son (Norris), SW, Ethics and palliative care. Will continue GOC discussion, no decision for trach/peg at this point.       SOCIAL/FAMILY:  [X] updated family via phone  [] family meeting    CODE STATUS:  [X] Full Code [] DNR [] DNI [] Palliative/Comfort Care    DISPOSITION:  [X] ICU [] Stroke Unit [] Floor [] EMU [] RCU [] PCU    [X] Patient is at high risk of neurologic deterioration/death due to: resp failure, seizures, hemorrhage.     Time spent:  35 critical care minutes ASSESSMENT:  Left frontal intracerebral hemorrhage, generalized periodic discharges, UTI.       NEURO:  Q4 neurochecks  Cont Keppra and Vimpat for GPDs/risk for seizures  Pain management with Tylenol.   Given previous psych history and workup not revealing any cause, NMDA encephalitis testing sent -> NMDA AB neg.    Trach and PEG being discussed with family.   Activity: [] OOB as tolerated [] Bedrest [X] PT [X] OT [] PMNR  Baseline mRS 4      PULM:  Intubated since 5/29- Day 14 of intubation.   History of COPD   CPAP as tolerated. Has failed CPAP. in the past   CPAP today. Will need trach if family desires.  Mild hypoxemia- likely atelectasis. Monitor.  CXR 6/12 clear.      CV:  Keep -160  TTE negative for PFO  HTN: Continue norvasc and metoprolol (home med)   Statin for HLD    D/Rob  [place].       RENAL:  Na 149.   IVL. Monitor Is/Os  Female iraheta. Bladder scan.  Start low dose cardura re: urine retention    GI:  Diet: On Glucerna Tube feeds  Last BM 6/12. Banana flakes. Rectal tube.   GI prophylaxis [] not indicated [X] PPI [] other:  Change PPI PO  Will add metamucil      ENDO:   Endo Following - adjusting NPH. Scheduled doses by endo.   Better control  Continue PO synthroid  Continue prednisone 15mg, 5 mg at bedtime (was on it at home for RA). Likely etiology of bruising  Goal euglycemia (-180)      HEME/ONC:  On SQL.  VTE prophylaxis: [X] SCDs [X] chemoprophylaxis [] high risk of DVT/PE on admission due to:  LE doppler 5/30- No DVT  Anemia- improved.      ID:  Afebrile   Ertapnem for ESBL x 7 days until 6/14      MISC: Palliative care consulted. Previous multiple family meetings- yield no decision  Family meeting was scheduled for 6/12 with daughter Heavenly, son (Norris), SW, Ethics and palliative care. However, son Norris stated that he could not get all family members to be a part of meeting and requested for it to be postponed until next week.       SOCIAL/FAMILY:  [X] updated family via phone  [] family meeting    CODE STATUS:  [X] Full Code [] DNR [] DNI [] Palliative/Comfort Care    DISPOSITION:  [X] ICU [] Stroke Unit [] Floor [] EMU [] RCU [] PCU    [X] Patient is at high risk of neurologic deterioration/death due to: resp failure, seizures, hemorrhage.     Time spent:  35 critical care minutes

## 2020-06-14 LAB
GLUCOSE BLDC GLUCOMTR-MCNC: 124 MG/DL — HIGH (ref 70–99)
GLUCOSE BLDC GLUCOMTR-MCNC: 139 MG/DL — HIGH (ref 70–99)
GLUCOSE BLDC GLUCOMTR-MCNC: 166 MG/DL — HIGH (ref 70–99)

## 2020-06-14 PROCEDURE — 71045 X-RAY EXAM CHEST 1 VIEW: CPT | Mod: 26

## 2020-06-14 PROCEDURE — 99232 SBSQ HOSP IP/OBS MODERATE 35: CPT

## 2020-06-14 PROCEDURE — 99292 CRITICAL CARE ADDL 30 MIN: CPT

## 2020-06-14 PROCEDURE — 99291 CRITICAL CARE FIRST HOUR: CPT

## 2020-06-14 RX ORDER — METOPROLOL TARTRATE 50 MG
25 TABLET ORAL EVERY 8 HOURS
Refills: 0 | Status: DISCONTINUED | OUTPATIENT
Start: 2020-06-14 | End: 2020-06-23

## 2020-06-14 RX ORDER — METOPROLOL TARTRATE 50 MG
25 TABLET ORAL EVERY 8 HOURS
Refills: 0 | Status: DISCONTINUED | OUTPATIENT
Start: 2020-06-14 | End: 2020-06-14

## 2020-06-14 RX ORDER — SODIUM CHLORIDE 9 MG/ML
500 INJECTION INTRAMUSCULAR; INTRAVENOUS; SUBCUTANEOUS ONCE
Refills: 0 | Status: COMPLETED | OUTPATIENT
Start: 2020-06-14 | End: 2020-06-14

## 2020-06-14 RX ORDER — ALTEPLASE 100 MG
2 KIT INTRAVENOUS ONCE
Refills: 0 | Status: COMPLETED | OUTPATIENT
Start: 2020-06-14 | End: 2020-06-15

## 2020-06-14 RX ORDER — HUMAN INSULIN 100 [IU]/ML
20 INJECTION, SUSPENSION SUBCUTANEOUS
Refills: 0 | Status: DISCONTINUED | OUTPATIENT
Start: 2020-06-14 | End: 2020-06-17

## 2020-06-14 RX ADMIN — SODIUM CHLORIDE 1000 MILLILITER(S): 9 INJECTION INTRAMUSCULAR; INTRAVENOUS; SUBCUTANEOUS at 04:09

## 2020-06-14 RX ADMIN — PANTOPRAZOLE SODIUM 40 MILLIGRAM(S): 20 TABLET, DELAYED RELEASE ORAL at 12:11

## 2020-06-14 RX ADMIN — ENOXAPARIN SODIUM 30 MILLIGRAM(S): 100 INJECTION SUBCUTANEOUS at 17:32

## 2020-06-14 RX ADMIN — LEVETIRACETAM 1000 MILLIGRAM(S): 250 TABLET, FILM COATED ORAL at 17:32

## 2020-06-14 RX ADMIN — Medication 20 MILLIEQUIVALENT(S): at 05:05

## 2020-06-14 RX ADMIN — HUMAN INSULIN 20 UNIT(S): 100 INJECTION, SUSPENSION SUBCUTANEOUS at 23:12

## 2020-06-14 RX ADMIN — Medication 150 MICROGRAM(S): at 05:05

## 2020-06-14 RX ADMIN — Medication 25 MILLIGRAM(S): at 15:42

## 2020-06-14 RX ADMIN — Medication 5 MILLIGRAM(S): at 21:22

## 2020-06-14 RX ADMIN — Medication 15 MILLIGRAM(S): at 05:28

## 2020-06-14 RX ADMIN — SIMVASTATIN 20 MILLIGRAM(S): 20 TABLET, FILM COATED ORAL at 21:21

## 2020-06-14 RX ADMIN — LACOSAMIDE 200 MILLIGRAM(S): 50 TABLET ORAL at 17:32

## 2020-06-14 RX ADMIN — CHLORHEXIDINE GLUCONATE 1 APPLICATION(S): 213 SOLUTION TOPICAL at 21:22

## 2020-06-14 RX ADMIN — ALTEPLASE 2 MILLIGRAM(S): KIT at 03:00

## 2020-06-14 RX ADMIN — CHLORHEXIDINE GLUCONATE 15 MILLILITER(S): 213 SOLUTION TOPICAL at 05:06

## 2020-06-14 RX ADMIN — HUMAN INSULIN 40 UNIT(S): 100 INJECTION, SUSPENSION SUBCUTANEOUS at 12:11

## 2020-06-14 RX ADMIN — Medication 1 PACKET(S): at 15:42

## 2020-06-14 RX ADMIN — AMLODIPINE BESYLATE 10 MILLIGRAM(S): 2.5 TABLET ORAL at 05:05

## 2020-06-14 RX ADMIN — LACOSAMIDE 200 MILLIGRAM(S): 50 TABLET ORAL at 05:05

## 2020-06-14 RX ADMIN — ENOXAPARIN SODIUM 30 MILLIGRAM(S): 100 INJECTION SUBCUTANEOUS at 05:05

## 2020-06-14 RX ADMIN — CHLORHEXIDINE GLUCONATE 15 MILLILITER(S): 213 SOLUTION TOPICAL at 17:32

## 2020-06-14 RX ADMIN — LEVETIRACETAM 1000 MILLIGRAM(S): 250 TABLET, FILM COATED ORAL at 05:05

## 2020-06-14 RX ADMIN — Medication 25 MILLIGRAM(S): at 05:05

## 2020-06-14 RX ADMIN — Medication 2: at 05:26

## 2020-06-14 RX ADMIN — HUMAN INSULIN 48 UNIT(S): 100 INJECTION, SUSPENSION SUBCUTANEOUS at 05:27

## 2020-06-14 RX ADMIN — Medication 1 PACKET(S): at 05:04

## 2020-06-14 RX ADMIN — HUMAN INSULIN 20 UNIT(S): 100 INJECTION, SUSPENSION SUBCUTANEOUS at 17:33

## 2020-06-14 RX ADMIN — Medication 1 PACKET(S): at 21:21

## 2020-06-14 RX ADMIN — Medication 25 MILLIGRAM(S): at 21:21

## 2020-06-14 RX ADMIN — Medication 2 MILLIGRAM(S): at 21:21

## 2020-06-14 NOTE — PROGRESS NOTE ADULT - ASSESSMENT
63 yo F well known by endo team from prior admission. Pt w/h/o uncontrolled T2DM with no known complications. Also h/o hypothyroidism/ul HTN/ COPD and RA on chronic steroids. Here with mental status changes associated with shaking found to have a left frontal parenchymal hematoma with mass effect on the left lateral ventricle and intraventricular extension with blood layering in the occipital horns. Endocrine following for hyperglycemia on chronic steroids bid.  Tolerating TFs with BG values mostly at goal on present insulin regimen. BG goal (100-180mg/dl).

## 2020-06-14 NOTE — PROGRESS NOTE ADULT - PROBLEM SELECTOR PLAN 2
-Levothyroxine 150mcg daily  -Hold TF 1 hour prior and after levothyroxine given.    call w/any questions  pager: [phone]   cell: [phone]  office: [phone]    -I spent a total time of 25 mins with the patient at bedside/on unit of which more than 50% of time was spent on counseling/coordination of care.

## 2020-06-14 NOTE — PROGRESS NOTE ADULT - ASSESSMENT
ASSESSMENT:  Left frontal intracerebral hemorrhage, generalized periodic discharges, UTI.       NEURO:  Neuro q4h checks  seizure management: Keppra and Vimpat  Pain management with Tylenol & fentanyl prn  Given previous psych history and workup not revealing any cause, NMDA encephalitis testing sent -> NMDA AB neg.    Trach and PEG being discussed with family. Awaiting family meeting on 6/16 for goals of care.  Activity: [] OOB as tolerated [] Bedrest [X] PT [X] OT [] PMNR      PULM:  Intubated since 5/29- Day 16 of intubation.   H/O of COPD   CPAP as tolerated. Has failed CPAP   Family meeting Tuesday 6/16 to discuss trach       CV:  Keep -160  TTE negative for PFO  HTN: Continue norvasc   sinus tachycardia: Lopressor 25 q8h   Statin for HLD        RENAL:  IVL. Monitor Is/Os  Female iraheta  on cardura for urinary retention      GI:  Diet: On Glucerna OGT feeds  Rectal tube.   Continue Banana flakes and metamucil   GI prophylaxis [] not indicated [X] PO PPI [] other:        ENDO:   Endo Following for adjustment of NPH; will follow up sugars tonight  Continue PO synthroid  Continue prednisone 15mg, 5 mg at bedtime (was on it at home for RA). Likely etiology of bruising  Goal euglycemia (-180)      HEME/ONC:  On SQL for DVT ppx- 30mg SQ q12.  VTE prophylaxis: [X] SCDs [X] chemoprophylaxis [X] high risk of DVT/PE on admission due to: bed bound at baseline  LE doppler 5/30- No DVT        ID:  Afebrile   Ertapnem for ESBL growing in urine, ended today, 6/14  Monitor for fevers      MISC: Palliative care consulted. Previous multiple family meetings- yield no decision. Ethics following  Family meeting was scheduled for 6/12 with daughter Heavenly, son (Norris), SW, Ethics and palliative care. However, son Norris stated that he could not get all family members to be a part of meeting and requested for it to be postponed until next Tuesday, 6/16      SOCIAL/FAMILY:  [X] updated family via phone  [] family meeting    CODE STATUS:  [X] Full Code     DISPOSITION:  [X] ICU    [X] Patient is at high risk of neurologic deterioration/death due to: resp failure, seizures, hemorrhage.     Time spent:  35 critical care minutes ASSESSMENT:  Left frontal intracerebral hemorrhage, generalized periodic discharges, UTI.       NEURO:  Neuro q4h checks  seizure management: Keppra and Vimpat  Pain management with Tylenol & fentanyl prn  Given previous psych history and workup not revealing any cause, NMDA encephalitis testing sent -> NMDA AB neg.    Trach and PEG being discussed with family. Awaiting family meeting on 6/16 for goals of care.  Activity: [] OOB as tolerated [] Bedrest [X] PT [X] OT [] PMNR      PULM:  Intubated since 5/29- Day 16 of intubation.   H/O of COPD   CPAP as tolerated. Has failed CPAP   Family meeting Tuesday 6/16 to discuss trach       CV:  Keep -160  TTE negative for PFO  HTN: Continue norvasc   sinus tachycardia: Lopressor 25 q8h   Statin for HLD        RENAL:  IVL. Monitor Is/Os  Female iraheta  on cardura for urinary retention  lab holiday tonight, 6/14      GI:  Diet: On Glucerna OGT feeds  Rectal tube.   Continue Banana flakes and metamucil   GI prophylaxis [] not indicated [X] PO PPI [] other:        ENDO:   Endo Following for adjustment of NPH; will follow up sugars tonight  Continue PO synthroid  Continue prednisone 15mg, 5 mg at bedtime (was on it at home for RA). Likely etiology of bruising  Goal euglycemia (-180)      HEME/ONC:  On SQL for DVT ppx- 30mg SQ q12.  VTE prophylaxis: [X] SCDs [X] chemoprophylaxis [X] high risk of DVT/PE on admission due to: bed bound at baseline  LE doppler 5/30- No DVT        ID:  Afebrile   Ertapnem for ESBL growing in urine, ended today, 6/14  Monitor for fevers      MISC: Palliative care consulted. Previous multiple family meetings- yield no decision. Ethics following  Family meeting was scheduled for 6/12 with daughter Heavenly, son (Norris), SW, Ethics and palliative care. However, son Norris stated that he could not get all family members to be a part of meeting and requested for it to be postponed until next Tuesday, 6/16      SOCIAL/FAMILY:  [X] updated family via phone  [] family meeting    CODE STATUS:  [X] Full Code     DISPOSITION:  [X] ICU    [X] Patient is at high risk of neurologic deterioration/death due to: resp failure, seizures, hemorrhage.     Time spent:  35 critical care minutes ASSESSMENT:  Left frontal intracerebral hemorrhage, generalized periodic discharges, UTI.       NEURO:  Neuro q4h checks  seizure management: Keppra and Vimpat  Pain management with Tylenol & fentanyl prn  Given previous psych history and workup not revealing any cause, NMDA encephalitis testing sent -> NMDA AB neg.    Trach and PEG being discussed with family. Awaiting family meeting on 6/16 for goals of care.  Activity: [] OOB as tolerated [] Bedrest [X] PT [X] OT [] PMNR      PULM:  Intubated since 5/29- Day 16 of intubation.   H/O of COPD   CPAP as tolerated. Has failed CPAP   Family meeting Tuesday 6/16 to discuss trach       CV:  Keep -160  TTE negative for PFO  HTN: Continue norvasc   sinus tachycardia: Lopressor 25 q8h   Statin for HLD        RENAL:  IVL. Monitor Is/Os  Female iraheta  on cardura for urinary retention  lab holiday tonight, 6/14      GI:  Diet: On Glucerna OGT feeds  Rectal tube.   Continue Banana flakes and metamucil   GI prophylaxis [] not indicated [X] PO PPI [] other:        ENDO:   Endo Following for adjustment of NPH; will follow up sugars tonight  Continue PO synthroid  Continue prednisone 15mg, 5 mg at bedtime (was on it at home for RA). Likely etiology of bruising  Goal euglycemia (-180)      HEME/ONC:  On SQL for DVT ppx- 30mg SQ q12.  VTE prophylaxis: [X] SCDs [X] chemoprophylaxis [X] high risk of DVT/PE on admission due to: bed bound at baseline  LE doppler 5/30- No DVT        ID:  Afebrile   Ertapnem for ESBL growing in urine, ended today, 6/14  Monitor for fevers      MISC: Palliative care consulted. Previous multiple family meetings- yield no decision. Ethics following  Family meeting was scheduled for 6/12 with daughter Heavenly, son (Norris), SW, Ethics and palliative care. However, son Norris stated that he could not get all family members to be a part of meeting and requested for it to be postponed until next Tuesday, 6/16.  One of the ports of the [facility] not working, will give cathflo      SOCIAL/FAMILY:  [X] updated family via phone  [] family meeting    CODE STATUS:  [X] Full Code     DISPOSITION:  [X] ICU    [X] Patient is at high risk of neurologic deterioration/death due to: resp failure, seizures, hemorrhage.     Time spent:  35 critical care minutes ASSESSMENT:  Left frontal intracerebral hemorrhage, generalized periodic discharges, UTI.       NEURO:  Neuro q4h checks  seizure management: Keppra and Vimpat  Pain management with Tylenol prn  Given previous psych history and workup not revealing any cause, NMDA encephalitis testing sent -> NMDA AB neg.    Trach and PEG being discussed with family. Awaiting family meeting on 6/16 for goals of care.  Activity: [] OOB as tolerated [] Bedrest [X] PT [X] OT [] PMNR      PULM:  Intubated since 5/29- Day 16 of intubation.   H/O of COPD   CPAP as tolerated. Has failed CPAP   Family meeting Tuesday 6/16 to discuss trach       CV:  Keep -160  TTE negative for PFO  HTN: Continue norvasc   sinus tachycardia: Lopressor 25 q8h   Statin for HLD        RENAL:  IVL. Monitor Is/Os  Female iraheta  on cardura for urinary retention  lab holiday tonight, 6/14      GI:  Diet: On Glucerna OGT feeds  Rectal tube.   Continue Banana flakes and metamucil   GI prophylaxis [] not indicated [X] PO PPI [] other:        ENDO:   Endo Following for adjustment of NPH; will follow up sugars tonight  Continue PO synthroid  Continue prednisone 15mg, 5 mg at bedtime (was on it at home for RA). Likely etiology of bruising  Goal euglycemia (-180)      HEME/ONC:  On SQL for DVT ppx- 30mg SQ q12.  VTE prophylaxis: [X] SCDs [X] chemoprophylaxis [X] high risk of DVT/PE on admission due to: bed bound at baseline  LE doppler 5/30- No DVT        ID:  Afebrile   Ertapnem for ESBL growing in urine, ended today, 6/14  Monitor for fevers      MISC: Palliative care consulted. Previous multiple family meetings- yield no decision. Ethics following  Family meeting was scheduled for 6/12 with daughter Heavenly, son (Norris), SW, Ethics and palliative care. However, son Norris stated that he could not get all family members to be a part of meeting and requested for it to be postponed until next Tuesday, 6/16.  One of the ports of the [facility] not working, will give cathflo      SOCIAL/FAMILY:  [X] updated family via phone  [] family meeting    CODE STATUS:  [X] Full Code     DISPOSITION:  [X] ICU    [X] Patient is at high risk of neurologic deterioration/death due to: resp failure, seizures, hemorrhage.     Time spent:  35 critical care minutes

## 2020-06-14 NOTE — PROGRESS NOTE ADULT - SUBJECTIVE AND OBJECTIVE BOX
HPI:  65 yo F with AMS since this morning.  Pt. was confused, didn't recognize son this morning.  He notes she was sleeping more than usual yesterday, which tipped off son that something was wrong.  At 11 am pt. was gasping for air like she was seizing. 2 hours ago pt. was dry heaving, her chest was going up and down and she started shaking like she was having a seizure.  Pt. has no history of seizures per son.  		PMH: hypothyroidism, HTN, IDDM, COPD/CHRIS on home O2, RA (on Prednisone), SBO s/p ex lap with lysis of adhesions c/b evisceration,  recent hospitalization in 3/2020 with encephalopathy, significantly hypothyroid (, without myxedema coma), small (stable) right sided SDH, UTI, catatonic reaction to VPA (now resolved) and DKA and psychiatric issues, 2020 hospitalization for GIB, Emphysematous cystitis, endometritis, Sigmoid colitis, morbid obesity, functional quadriplegia    HCP: Norris Rush, Son [phone]; Daughter, Heavenly Schmidt [phone]; son asks that only him or his sister, Heavenly be contacted for patient updates/information as they are both proxy's for patient (29 May 2020 23:41)    OVERNIGHT EVENTS: No acute event overnight    Vital Signs Last 24 Hrs  T(C): 36.5 (2020 03:00), Max: 37.4 (2020 07:00)  T(F): 97.7 (2020 03:00), Max: 99.3 (2020 07:00)  HR: 100 (2020 04:30) (96 - 108)  BP: 104/73 (2020 04:30) (92/63 - 122/77)  BP(mean): 73 (2020 04:30) (71 - 105)  RR: 16 (2020 04:30) (15 - 23)  SpO2: 100% (2020 04:30) (100% - 100%)    I&O's Detail    2020 07:01  -  2020 07:00  --------------------------------------------------------  IN:    Enteral Tube Flush: 170 mL    Glucerna: 1430 mL  Total IN: 1600 mL    OUT:    Incontinent per Collection Ba mL    Rectal Tube: 600 mL  Total OUT: 1750 mL    Total NET: -150 mL      2020 07:  -  2020 06:48  --------------------------------------------------------  IN:    Enteral Tube Flush: 120 mL    Glucerna: 1495 mL    IV PiggyBack: 50 mL  Total IN: 1665 mL    OUT:    Incontinent per Collection Ba mL    Rectal Tube: 500 mL  Total OUT: 1800 mL    Total NET: -135 mL        I&O's Summary    2020 07:  -  2020 07:00  --------------------------------------------------------  IN: 1600 mL / OUT: 1750 mL / NET: -150 mL    2020 07:  -  2020 06:48  --------------------------------------------------------  IN: 1665 mL / OUT: 1800 mL / NET: -135 mL        PHYSICAL EXAM:  Neurological:  intubated, eye open to noxious stimuli, disconjugated gaze, pupils 3mm reactive, +cough/gag, not follow commands, upper extremities tremors when stimulated, LE TF  Cardiovascular: +s1, s2  Respiratory: clear to auscultation b/l  Gastrointestinal: soft, non-distended, non-tender  Extremities: Warm, dry      LABS:                        8.6    8.33  )-----------( 482      ( 2020 22:33 )             28.4     06-13    144  |  107  |  23  ----------------------------<  88  3.8   |  25  |  0.55    Ca    9.3      2020 22:33  Phos  3.2     06-13  Mg     2.1     06-13              CAPILLARY BLOOD GLUCOSE      POCT Blood Glucose.: 166 mg/dL (2020 05:08)  POCT Blood Glucose.: 99 mg/dL (2020 23:07)  POCT Blood Glucose.: 169 mg/dL (2020 17:18)  POCT Blood Glucose.: 182 mg/dL (2020 11:17)      Drug Levels: [] N/A    CSF Analysis: [] N/A      Allergies    Depakote (Other)  Seroquel (Other (Severe))    Intolerances      MEDICATIONS:  Antibiotics:    Neuro:  acetaminophen    Suspension .. 650 milliGRAM(s) Oral every 6 hours PRN  fentaNYL    Injectable 50 MICROGram(s) IV Push every 2 hours PRN  lacosamide Solution 200 milliGRAM(s) Oral two times a day  levETIRAcetam  Solution 1000 milliGRAM(s) Oral two times a day    Anticoagulation:  enoxaparin Injectable 30 milliGRAM(s) SubCutaneous two times a day    OTHER:  amLODIPine   Tablet 10 milliGRAM(s) Oral daily  chlorhexidine 0.12% Liquid 15 milliLiter(s) Oral Mucosa every 12 hours  chlorhexidine 4% Liquid 1 Application(s) Topical <User Schedule>  dextrose 50% Injectable 25 Gram(s) IV Push once  doxazosin 2 milliGRAM(s) Oral at bedtime  insulin lispro (HumaLOG) corrective regimen sliding scale   SubCutaneous every 6 hours  insulin NPH human recombinant 48 Unit(s) SubCutaneous <User Schedule>  insulin NPH human recombinant 40 Unit(s) SubCutaneous <User Schedule>  insulin NPH human recombinant 40 Unit(s) SubCutaneous <User Schedule>  insulin NPH human recombinant 22 Unit(s) SubCutaneous <User Schedule>  levothyroxine 150 MICROGram(s) Oral daily  metoprolol tartrate 25 milliGRAM(s) Oral every 8 hours  pantoprazole   Suspension 40 milliGRAM(s) Oral daily  predniSONE   Tablet 15 milliGRAM(s) Oral <User Schedule>  predniSONE   Tablet 5 milliGRAM(s) Oral at bedtime  psyllium Powder 1 Packet(s) Oral three times a day  simvastatin 20 milliGRAM(s) Oral at bedtime    IVF:  dextrose 5%. 1000 milliLiter(s) IV Continuous <Continuous>    CULTURES:  Culture Results:   >100,000 CFU/ml Escherichia coli ESBL ( @ 01:09)  Culture Results:   No Growth Final ( @ 15:00)    RADIOLOGY & ADDITIONAL TESTS: HPI:  65 yo F with AMS since this morning.  Pt. was confused, didn't recognize son this morning.  He notes she was sleeping more than usual yesterday, which tipped off son that something was wrong.  At 11 am pt. was gasping for air like she was seizing. 2 hours ago pt. was dry heaving, her chest was going up and down and she started shaking like she was having a seizure.  Pt. has no history of seizures per son.  		PMH: hypothyroidism, HTN, IDDM, COPD/CHRIS on home O2, RA (on Prednisone), SBO s/p ex lap with lysis of adhesions c/b evisceration,  recent hospitalization in 3/2020 with encephalopathy, significantly hypothyroid (, without myxedema coma), small (stable) right sided SDH, UTI, catatonic reaction to VPA (now resolved) and DKA and psychiatric issues, 2020 hospitalization for GIB, Emphysematous cystitis, endometritis, Sigmoid colitis, morbid obesity, functional quadriplegia    HCP: Norris Rush, Son [phone]; Daughter, Heavenly Schmidt [phone]; son asks that only him or his sister, Heavenly be contacted for patient updates/information as they are both proxy's for patient (29 May 2020 23:41)    OVERNIGHT EVENTS: electrolytes supplemneted      Vital Signs Last 24 Hrs  T(C): 36.5 (2020 03:00), Max: 37.4 (2020 07:00)  T(F): 97.7 (2020 03:00), Max: 99.3 (2020 07:00)  HR: 100 (2020 04:30) (96 - 108)  BP: 104/73 (2020 04:30) (92/63 - 122/77)  BP(mean): 73 (2020 04:30) (71 - 105)  RR: 16 (2020 04:30) (15 - 23)  SpO2: 100% (2020 04:30) (100% - 100%)    I&O's Detail    2020 07:01  -  2020 07:00  --------------------------------------------------------  IN:    Enteral Tube Flush: 170 mL    Glucerna: 1430 mL  Total IN: 1600 mL    OUT:    Incontinent per Collection Ba mL    Rectal Tube: 600 mL  Total OUT: 1750 mL    Total NET: -150 mL      2020 07:01  -  2020 06:48  --------------------------------------------------------  IN:    Enteral Tube Flush: 120 mL    Glucerna: 1495 mL    IV PiggyBack: 50 mL  Total IN: 1665 mL    OUT:    Incontinent per Collection Ba mL    Rectal Tube: 500 mL  Total OUT: 1800 mL    Total NET: -135 mL        I&O's Summary    2020 07:  -  2020 07:00  --------------------------------------------------------  IN: 1600 mL / OUT: 1750 mL / NET: -150 mL    2020 07:  -  2020 06:48  --------------------------------------------------------  IN: 1665 mL / OUT: 1800 mL / NET: -135 mL        LABS:                        8.6    8.33  )-----------( 482      ( 2020 22:33 )             28.4     06-13    144  |  107  |  23  ----------------------------<  88  3.8   |  25  |  0.55    Ca    9.3      2020 22:33  Phos  3.2     06-13  Mg     2.1     06-13    CAPILLARY BLOOD GLUCOSE      POCT Blood Glucose.: 166 mg/dL (2020 05:08)  POCT Blood Glucose.: 99 mg/dL (2020 23:07)  POCT Blood Glucose.: 169 mg/dL (2020 17:18)  POCT Blood Glucose.: 182 mg/dL (2020 11:17)        Allergies    Depakote (Other)  Seroquel (Other (Severe))    Intolerances      MEDICATIONS:  Antibiotics:    Neuro:  acetaminophen    Suspension .. 650 milliGRAM(s) Oral every 6 hours PRN  fentaNYL    Injectable 50 MICROGram(s) IV Push every 2 hours PRN  lacosamide Solution 200 milliGRAM(s) Oral two times a day  levETIRAcetam  Solution 1000 milliGRAM(s) Oral two times a day    Anticoagulation:  enoxaparin Injectable 30 milliGRAM(s) SubCutaneous two times a day    OTHER:  amLODIPine   Tablet 10 milliGRAM(s) Oral daily  chlorhexidine 0.12% Liquid 15 milliLiter(s) Oral Mucosa every 12 hours  chlorhexidine 4% Liquid 1 Application(s) Topical <User Schedule>  dextrose 50% Injectable 25 Gram(s) IV Push once  doxazosin 2 milliGRAM(s) Oral at bedtime  insulin lispro (HumaLOG) corrective regimen sliding scale   SubCutaneous every 6 hours  insulin NPH human recombinant 48 Unit(s) SubCutaneous <User Schedule>  insulin NPH human recombinant 40 Unit(s) SubCutaneous <User Schedule>  insulin NPH human recombinant 40 Unit(s) SubCutaneous <User Schedule>  insulin NPH human recombinant 22 Unit(s) SubCutaneous <User Schedule>  levothyroxine 150 MICROGram(s) Oral daily  metoprolol tartrate 25 milliGRAM(s) Oral every 8 hours  pantoprazole   Suspension 40 milliGRAM(s) Oral daily  predniSONE   Tablet 15 milliGRAM(s) Oral <User Schedule>  predniSONE   Tablet 5 milliGRAM(s) Oral at bedtime  psyllium Powder 1 Packet(s) Oral three times a day  simvastatin 20 milliGRAM(s) Oral at bedtime        CULTURES:  Culture Results:   >100,000 CFU/ml Escherichia coli ESBL ( @ 01:09)  Culture Results:   No Growth Final ( @ 15:00)    PHYSICAL EXAM:  Neurological:  intubated, eye open to noxious stimuli, disconjugated gaze, pupils 3mm reactive, +cough/gag, not follow commands, upper extremities tremors when stimulated, LE TF  Cardiovascular: +s1, s2  Respiratory: clear to auscultation b/l  Gastrointestinal: soft, non-distended, non-tender  Extremities: Warm, dry

## 2020-06-14 NOTE — PROGRESS NOTE ADULT - SUBJECTIVE AND OBJECTIVE BOX
Diabetes Follow up note:    Chief complaint: T2DM/steroid induced hyperglycemia    Interval Hx: BG values remain mostly at goal. Midnight dose of NPH reduced to 15 units as BG only 99mg/dl. Tolerating feeds @ goal per RN. No change to mental status/remains intubated.     Review of Systems:  unable to provide ROS.     MEDS:  insulin lispro (HumaLOG) corrective regimen sliding scale   SubCutaneous every 6 hours  insulin NPH human recombinant 48 Unit(s) SubCutaneous <User Schedule>  insulin NPH human recombinant 40 Unit(s) SubCutaneous <User Schedule>  insulin NPH human recombinant 40 Unit(s) SubCutaneous <User Schedule>  insulin NPH human recombinant 22 Unit(s) SubCutaneous <User Schedule>  levothyroxine 150 MICROGram(s) Oral daily  predniSONE   Tablet 15 milliGRAM(s) Oral <User Schedule>  predniSONE   Tablet 5 milliGRAM(s) Oral at bedtime  simvastatin 20 milliGRAM(s) Oral at bedtime      Allergies    Depakote (Other)  Seroquel (Other (Severe))      PE:  General: Female lying in bed. NAD>   Vital Signs Last 24 Hrs  T(C): 37.3 (14 Jun 2020 11:00), Max: 37.4 (14 Jun 2020 07:00)  T(F): 99.2 (14 Jun 2020 11:00), Max: 99.4 (14 Jun 2020 07:00)  HR: 106 (14 Jun 2020 11:00) (96 - 108)  BP: 110/75 (14 Jun 2020 11:00) (92/63 - 122/77)  BP(mean): 85 (14 Jun 2020 11:00) (71 - 105)  RR: 15 (14 Jun 2020 11:00) (15 - 23)  SpO2: 100% (14 Jun 2020 11:00) (100% - 100%)  HEENT: ETT/OG tube in place. On vent.   Abd: Soft, NT,ND, Obese.   Extremities: Warm  Neuro: unresponsive     LABS:  POCT Blood Glucose.: 139 mg/dL (06-14-20 @ 12:05)  POCT Blood Glucose.: 166 mg/dL (06-14-20 @ 05:08)  POCT Blood Glucose.: 99 mg/dL (06-13-20 @ 23:07)  POCT Blood Glucose.: 169 mg/dL (06-13-20 @ 17:18)  POCT Blood Glucose.: 182 mg/dL (06-13-20 @ 11:17)  POCT Blood Glucose.: 196 mg/dL (06-13-20 @ 05:04)  POCT Blood Glucose.: 147 mg/dL (06-12-20 @ 23:36)  POCT Blood Glucose.: 140 mg/dL (06-12-20 @ 17:14)  POCT Blood Glucose.: 113 mg/dL (06-12-20 @ 11:57)  POCT Blood Glucose.: 134 mg/dL (06-12-20 @ 05:15)  POCT Blood Glucose.: 112 mg/dL (06-11-20 @ 22:57)  POCT Blood Glucose.: 175 mg/dL (06-11-20 @ 17:35)                            8.6    8.33  )-----------( 482      ( 13 Jun 2020 22:33 )             28.4       06-13    144  |  107  |  23  ----------------------------<  88  3.8   |  25  |  0.55    Ca    9.3      13 Jun 2020 22:33  Phos  3.2     06-13  Mg     2.1     06-13        Thyroid Function Tests:  06-11 @ 13:58 TSH 8.26 FreeT4 -- T3 55 Anti TPO -- Anti Thyroglobulin Ab -- TSI --  06-04 @ 01:58 TSH -- FreeT4 2.7 T3 -- Anti TPO -- Anti Thyroglobulin Ab -- TSI --      A1C with Estimated Average Glucose Result: 7.4 % (05-30-20 @ 04:43)  A1C with Estimated Average Glucose Result: 6.4 % (05-14-20 @ 16:23)  A1C with Estimated Average Glucose Result: 6.5 % (05-13-20 @ 08:24)  Hemoglobin A1C, Whole Blood: 9.0 % (01-28-20 @ 19:30)  Hemoglobin A1C, Whole Blood: 9.2 % (01-27-20 @ 17:36)          Contact number: bhavna [phone] or [phone]

## 2020-06-14 NOTE — PROGRESS NOTE ADULT - PROBLEM SELECTOR PLAN 1
-test BG Q6h  -Adjust NPH as follows  6pm/12am: NPH 20 units   c/w NPH 48 units @ 6am and 40 units @ 12pm  -Hold NPH if TF off. Can reduce dose if BG less than 100mg/dl  -c/w Humalog moderate correction scale Q6h  Please notify endocrine if any changes in steroid regimen or TF regimen so we can adjust insulin regimen accordingly.  -Plan discussed with RN

## 2020-06-14 NOTE — PROGRESS NOTE ADULT - SUBJECTIVE AND OBJECTIVE BOX
HPI:  63 yo F with AMS since this morning.  Pt. was confused, didn't recognize son this morning.  He notes she was sleeping more than usual yesterday, which tipped off son that something was wrong.  At 11 am pt. was gasping for air like she was seizing. 2 hours ago pt. was dry heaving, her chest was going up and down and she started shaking like she was having a seizure.  Pt. has no history of seizures per son.    PMH: hypothyroidism, HTN, IDDM, COPD/CHRIS on home O2, RA (on Prednisone), SBO s/p ex lap with lysis of adhesions c/b evisceration,  recent hospitalization in 3/2020 with encephalopathy, significantly hypothyroid (, without myxedema coma), small (stable) right sided SDH, UTI, catatonic reaction to VPA (now resolved) and DKA and psychiatric issues, 5/2020 hospitalization for GIB, Emphysematous cystitis, endometritis, Sigmoid colitis, morbid obesity, functional quadriplegia    HCP: Norris Rush, Son [phone]; Daughter, Heavenly Schmidt [phone]; son asks that only him or his sister, Heavenly be contacted for patient updates/information as they are both proxy's for patient (29 May 2020 23:41)    OVERNIGHT EVENTS: electrolytes supplemented    MEDICATIONS  (STANDING):  amLODIPine   Tablet 10 milliGRAM(s) Oral daily  chlorhexidine 0.12% Liquid 15 milliLiter(s) Oral Mucosa every 12 hours  chlorhexidine 4% Liquid 1 Application(s) Topical <User Schedule>  dextrose 5%. 1000 milliLiter(s) (50 mL/Hr) IV Continuous <Continuous>  dextrose 50% Injectable 25 Gram(s) IV Push once  doxazosin 2 milliGRAM(s) Oral at bedtime  enoxaparin Injectable 30 milliGRAM(s) SubCutaneous two times a day  insulin lispro (HumaLOG) corrective regimen sliding scale   SubCutaneous every 6 hours  insulin NPH human recombinant 48 Unit(s) SubCutaneous <User Schedule>  insulin NPH human recombinant 40 Unit(s) SubCutaneous <User Schedule>  insulin NPH human recombinant 20 Unit(s) SubCutaneous <User Schedule>  lacosamide Solution 200 milliGRAM(s) Oral two times a day  levETIRAcetam  Solution 1000 milliGRAM(s) Oral two times a day  levothyroxine 150 MICROGram(s) Oral daily  metoprolol tartrate 25 milliGRAM(s) Oral every 8 hours  pantoprazole   Suspension 40 milliGRAM(s) Oral daily  predniSONE   Tablet 15 milliGRAM(s) Oral <User Schedule>  predniSONE   Tablet 5 milliGRAM(s) Oral at bedtime  psyllium Powder 1 Packet(s) Oral three times a day  simvastatin 20 milliGRAM(s) Oral at bedtime    MEDICATIONS  (PRN):  acetaminophen    Suspension .. 650 milliGRAM(s) Oral every 6 hours PRN Temp greater or equal to 38C (100.4F), Mild Pain (1 - 3)  fentaNYL    Injectable 50 MICROGram(s) IV Push every 2 hours PRN Severe Pain (7 - 10)  sodium chloride 0.9% lock flush 10 milliLiter(s) IV Push every 1 hour PRN Pre/post blood products, medications, blood draw, and to maintain line patency  Vital Signs Last 24 Hrs  T(C): 37.4 (14 Jun 2020 15:00), Max: 37.4 (14 Jun 2020 07:00)  T(F): 99.4 (14 Jun 2020 15:00), Max: 99.4 (14 Jun 2020 07:00)  HR: 105 (14 Jun 2020 16:12) (96 - 107)  BP: 119/78 (14 Jun 2020 15:00) (92/63 - 120/72)  BP(mean): 88 (14 Jun 2020 15:00) (71 - 105)  RR: 17 (14 Jun 2020 15:00) (15 - 23)  SpO2: 100% (14 Jun 2020 16:12) (100% - 100%)                          8.6    8.33  )-----------( 482      ( 13 Jun 2020 22:33 )             28.4     Basic Metabolic Panel - STAT (06.13.20 @ 22:33)    Sodium, Serum: 144 mmol/L    Potassium, Serum: 3.8 mmol/L    Chloride, Serum: 107 mmol/L    Carbon Dioxide, Serum: 25 mmol/L    Anion Gap, Serum: 12 mmol/L    Blood Urea Nitrogen, Serum: 23 mg/dL    Creatinine, Serum: 0.55 mg/dL    Glucose, Serum: 88 mg/dL    Calcium, Total Serum: 9.3 mg/dL      Imaging: Reviewed      PHYSICAL EXAM:  Neurological:  intubated, eye open to noxious stimuli, disconjugated gaze, pupils 3mm reactive, +cough/gag, not follow commands, upper extremities tremors when stimulated, LE TF      Diet: OGT Glucerna 1.2

## 2020-06-14 NOTE — PROGRESS NOTE ADULT - ASSESSMENT
ASSESSMENT:  Left frontal intracerebral hemorrhage, generalized periodic discharges, UTI.       NEURO:  Neuro q4h checks  seizure management: Keppra and Vimpat  Pain management with Tylenol & fentanyl prn  Given previous psych history and workup not revealing any cause, NMDA encephalitis testing sent -> NMDA AB neg.    Trach and PEG being discussed with family.   Activity: [] OOB as tolerated [] Bedrest [X] PT [X] OT [] PMNR      PULM:  Intubated since 5/29- Day 14 of intubation.   H/O of COPD   CPAP as tolerated. Has failed CPAP in the past   Mild hypoxemia- likely atelectasis; will monitor      CV:  Keep -160  TTE negative for PFO  HTN: Continue norvasc   sinus tachycardia, metoprolol increased to 25 q8h overnight   Statin for HLD        RENAL:  IVL. Monitor Is/Os  Female iraheta  on cardura for urinary retention      GI:  Diet: On Glucerna OGT feeds  Rectal tube output for past 24 hours: 500ml. Continue Banana flakes and metamucil   GI prophylaxis [] not indicated [X] PO PPI [] other:        ENDO:   Endo Following for adjustment of NPH; will follow up sugars tonight  Continue PO synthroid  Continue prednisone 15mg, 5 mg at bedtime (was on it at home for RA). Likely etiology of bruising  Goal euglycemia (-180)      HEME/ONC:  On SQL for DVT ppx  VTE prophylaxis: [X] SCDs [X] chemoprophylaxis [X] high risk of DVT/PE on admission due to: bed bound at baseline  LE doppler 5/30- No DVT        ID:  Afebrile   Ertapnem for ESBL found growing in urine culture on 6/4 - will end 6/14      MISC: Palliative care consulted. Previous multiple family meetings- yield no decision. Ethics following  Family meeting was scheduled for 6/12 with daughter Heavenly, son (Norris), SW, Ethics and palliative care. However, son Norris stated that he could not get all family members to be a part of meeting and requested for it to be postponed until next Tuesday, 6/16      SOCIAL/FAMILY:  [X] updated family via phone  [] family meeting    CODE STATUS:  [X] Full Code [] DNR [] DNI [] Palliative/Comfort Care    DISPOSITION:  [X] ICU [] Stroke Unit [] Floor [] EMU [] RCU [] PCU    [X] Patient is at high risk of neurologic deterioration/death due to: resp failure, seizures, hemorrhage.     Time spent:  35 critical care minutes ASSESSMENT:  Left frontal intracerebral hemorrhage, generalized periodic discharges, UTI.       NEURO:  Neuro q4h checks  seizure management: Keppra and Vimpat  Pain management with Tylenol & fentanyl prn  Given previous psych history and workup not revealing any cause, NMDA encephalitis testing sent -> NMDA AB neg.    Trach and PEG being discussed with family.   Activity: [] OOB as tolerated [] Bedrest [X] PT [X] OT [] PMNR      PULM:  Intubated since 5/29- Day 16 of intubation.   H/O of COPD   CPAP as tolerated. Has failed CPAP   Mild hypoxemia- likely atelectasis; will monitor  Family meeting Tuesday to discuss trach       CV:  Keep -160  TTE negative for PFO  HTN: Continue norvasc   sinus tachycardia, metoprolol increased to 25 q8h overnight   Statin for HLD        RENAL:  IVL. Monitor Is/Os= even   Female iraheta  on cardura for urinary retention      GI:  Diet: On Glucerna OGT feeds  Rectal tube output for past 24 hours: 500ml. Continue Banana flakes and metamucil   GI prophylaxis [] not indicated [X] PO PPI [] other:        ENDO: S/P hyperglycemia - IR DM  Endo Following for adjustment of NPH; will follow up sugars tonight  Continue PO synthroid  Continue prednisone 15mg, 5 mg at bedtime (was on it at home for RA). Likely etiology of bruising  Goal euglycemia (-180)      HEME/ONC:  On SQL for DVT ppx- 30mg SQ q12.  VTE prophylaxis: [X] SCDs [X] chemoprophylaxis [X] high risk of DVT/PE on admission due to: bed bound at baseline  LE doppler 5/30- No DVT        ID:  Afebrile   Ertapnem for ESBL found growing in urine culture on 6/4 - will end today - 6/14      MISC: Palliative care consulted. Previous multiple family meetings- yield no decision. Ethics following  Family meeting was scheduled for 6/12 with daughter Heavenly, son (Norris), BHAVIN, Ethics and palliative care. However, son Norris stated that he could not get all family members to be a part of meeting and requested for it to be postponed until next Tuesday, 6/16      SOCIAL/FAMILY:  [X] updated family via phone  [] family meeting    CODE STATUS:  [X] Full Code     DISPOSITION:  [X] ICU    [X] Patient is at high risk of neurologic deterioration/death due to: resp failure, seizures, hemorrhage.     Time spent:  35 critical care minutes

## 2020-06-15 LAB
ANION GAP SERPL CALC-SCNC: 12 MMOL/L — SIGNIFICANT CHANGE UP (ref 5–17)
BUN SERPL-MCNC: 20 MG/DL — SIGNIFICANT CHANGE UP (ref 7–23)
CALCIUM SERPL-MCNC: 8.9 MG/DL — SIGNIFICANT CHANGE UP (ref 8.4–10.5)
CHLORIDE SERPL-SCNC: 109 MMOL/L — HIGH (ref 96–108)
CO2 SERPL-SCNC: 25 MMOL/L — SIGNIFICANT CHANGE UP (ref 22–31)
CREAT SERPL-MCNC: 0.54 MG/DL — SIGNIFICANT CHANGE UP (ref 0.5–1.3)
GLUCOSE BLDC GLUCOMTR-MCNC: 103 MG/DL — HIGH (ref 70–99)
GLUCOSE BLDC GLUCOMTR-MCNC: 161 MG/DL — HIGH (ref 70–99)
GLUCOSE BLDC GLUCOMTR-MCNC: 88 MG/DL — SIGNIFICANT CHANGE UP (ref 70–99)
GLUCOSE BLDC GLUCOMTR-MCNC: 92 MG/DL — SIGNIFICANT CHANGE UP (ref 70–99)
GLUCOSE SERPL-MCNC: 100 MG/DL — HIGH (ref 70–99)
HCT VFR BLD CALC: 27.6 % — LOW (ref 34.5–45)
HGB BLD-MCNC: 8.5 G/DL — LOW (ref 11.5–15.5)
MAGNESIUM SERPL-MCNC: 2.1 MG/DL — SIGNIFICANT CHANGE UP (ref 1.6–2.6)
MCHC RBC-ENTMCNC: 29.6 PG — SIGNIFICANT CHANGE UP (ref 27–34)
MCHC RBC-ENTMCNC: 30.8 GM/DL — LOW (ref 32–36)
MCV RBC AUTO: 96.2 FL — SIGNIFICANT CHANGE UP (ref 80–100)
NRBC # BLD: 0 /100 WBCS — SIGNIFICANT CHANGE UP (ref 0–0)
PHOSPHATE SERPL-MCNC: 3.6 MG/DL — SIGNIFICANT CHANGE UP (ref 2.5–4.5)
PLATELET # BLD AUTO: 446 K/UL — HIGH (ref 150–400)
POTASSIUM SERPL-MCNC: 3.9 MMOL/L — SIGNIFICANT CHANGE UP (ref 3.5–5.3)
POTASSIUM SERPL-SCNC: 3.9 MMOL/L — SIGNIFICANT CHANGE UP (ref 3.5–5.3)
RBC # BLD: 2.87 M/UL — LOW (ref 3.8–5.2)
RBC # FLD: 19.4 % — HIGH (ref 10.3–14.5)
SODIUM SERPL-SCNC: 146 MMOL/L — HIGH (ref 135–145)
WBC # BLD: 8.56 K/UL — SIGNIFICANT CHANGE UP (ref 3.8–10.5)
WBC # FLD AUTO: 8.56 K/UL — SIGNIFICANT CHANGE UP (ref 3.8–10.5)

## 2020-06-15 PROCEDURE — 99292 CRITICAL CARE ADDL 30 MIN: CPT

## 2020-06-15 PROCEDURE — 71045 X-RAY EXAM CHEST 1 VIEW: CPT | Mod: 26

## 2020-06-15 PROCEDURE — 99232 SBSQ HOSP IP/OBS MODERATE 35: CPT

## 2020-06-15 PROCEDURE — 99291 CRITICAL CARE FIRST HOUR: CPT

## 2020-06-15 RX ORDER — AMLODIPINE BESYLATE 2.5 MG/1
10 TABLET ORAL DAILY
Refills: 0 | Status: DISCONTINUED | OUTPATIENT
Start: 2020-06-15 | End: 2020-07-11

## 2020-06-15 RX ORDER — POTASSIUM CHLORIDE 20 MEQ
20 PACKET (EA) ORAL ONCE
Refills: 0 | Status: COMPLETED | OUTPATIENT
Start: 2020-06-15 | End: 2020-06-15

## 2020-06-15 RX ORDER — HUMAN INSULIN 100 [IU]/ML
44 INJECTION, SUSPENSION SUBCUTANEOUS
Refills: 0 | Status: DISCONTINUED | OUTPATIENT
Start: 2020-06-16 | End: 2020-06-16

## 2020-06-15 RX ADMIN — ENOXAPARIN SODIUM 30 MILLIGRAM(S): 100 INJECTION SUBCUTANEOUS at 05:11

## 2020-06-15 RX ADMIN — HUMAN INSULIN 40 UNIT(S): 100 INJECTION, SUSPENSION SUBCUTANEOUS at 11:29

## 2020-06-15 RX ADMIN — Medication 1 PACKET(S): at 22:41

## 2020-06-15 RX ADMIN — Medication 25 MILLIGRAM(S): at 22:40

## 2020-06-15 RX ADMIN — LACOSAMIDE 200 MILLIGRAM(S): 50 TABLET ORAL at 05:12

## 2020-06-15 RX ADMIN — Medication 1 PACKET(S): at 05:12

## 2020-06-15 RX ADMIN — Medication 2: at 05:09

## 2020-06-15 RX ADMIN — HUMAN INSULIN 48 UNIT(S): 100 INJECTION, SUSPENSION SUBCUTANEOUS at 05:11

## 2020-06-15 RX ADMIN — PANTOPRAZOLE SODIUM 40 MILLIGRAM(S): 20 TABLET, DELAYED RELEASE ORAL at 11:24

## 2020-06-15 RX ADMIN — Medication 25 MILLIGRAM(S): at 13:24

## 2020-06-15 RX ADMIN — CHLORHEXIDINE GLUCONATE 1 APPLICATION(S): 213 SOLUTION TOPICAL at 22:39

## 2020-06-15 RX ADMIN — AMLODIPINE BESYLATE 10 MILLIGRAM(S): 2.5 TABLET ORAL at 22:39

## 2020-06-15 RX ADMIN — Medication 1 PACKET(S): at 13:24

## 2020-06-15 RX ADMIN — ENOXAPARIN SODIUM 30 MILLIGRAM(S): 100 INJECTION SUBCUTANEOUS at 17:37

## 2020-06-15 RX ADMIN — AMLODIPINE BESYLATE 10 MILLIGRAM(S): 2.5 TABLET ORAL at 05:13

## 2020-06-15 RX ADMIN — HUMAN INSULIN 20 UNIT(S): 100 INJECTION, SUSPENSION SUBCUTANEOUS at 23:01

## 2020-06-15 RX ADMIN — HUMAN INSULIN 20 UNIT(S): 100 INJECTION, SUSPENSION SUBCUTANEOUS at 17:29

## 2020-06-15 RX ADMIN — Medication 100 MILLIEQUIVALENT(S): at 05:09

## 2020-06-15 RX ADMIN — ALTEPLASE 2 MILLIGRAM(S): KIT at 05:13

## 2020-06-15 RX ADMIN — LACOSAMIDE 200 MILLIGRAM(S): 50 TABLET ORAL at 17:37

## 2020-06-15 RX ADMIN — LEVETIRACETAM 1000 MILLIGRAM(S): 250 TABLET, FILM COATED ORAL at 05:12

## 2020-06-15 RX ADMIN — CHLORHEXIDINE GLUCONATE 15 MILLILITER(S): 213 SOLUTION TOPICAL at 05:11

## 2020-06-15 RX ADMIN — Medication 150 MICROGRAM(S): at 05:15

## 2020-06-15 RX ADMIN — Medication 2 MILLIGRAM(S): at 22:39

## 2020-06-15 RX ADMIN — LEVETIRACETAM 1000 MILLIGRAM(S): 250 TABLET, FILM COATED ORAL at 17:37

## 2020-06-15 RX ADMIN — CHLORHEXIDINE GLUCONATE 15 MILLILITER(S): 213 SOLUTION TOPICAL at 17:37

## 2020-06-15 RX ADMIN — Medication 15 MILLIGRAM(S): at 05:13

## 2020-06-15 RX ADMIN — SIMVASTATIN 20 MILLIGRAM(S): 20 TABLET, FILM COATED ORAL at 22:41

## 2020-06-15 RX ADMIN — Medication 25 MILLIGRAM(S): at 05:13

## 2020-06-15 RX ADMIN — Medication 5 MILLIGRAM(S): at 22:40

## 2020-06-15 NOTE — CHART NOTE - NSCHARTNOTEFT_GEN_A_CORE
Nutrition Follow Up Note   Patient seen for: nutrition follow up on  ICU     Source: medical record, communication with team.     Chart reviewed, events noted. Adm for ICH. Remains intubated.    Diet Order: Diet, NPO with Tube Feed:   Tube Feeding Modality: Orogastric  Glucerna 1.2 Dewey (GLUCERNARTH)  Total Volume for 24 Hours (mL): 1560  Continuous  Starting Tube Feed Rate {mL per Hour}: 20  Increase Tube Feed Rate by (mL): 10     Every 4 hours  Until Goal Tube Feed Rate (mL per Hour): 65  Tube Feed Duration (in Hours): 24  Tube Feed Start Time: 23:00 (06-02-20 @ 11:06)    CURRENT EN ORDER PROVIDES:  (based on upper  lbs/60kg): 1560ml, 1872kcal (31kcal/kg) and 94g protein (1.6g protein/kg)    Nutrition Events:   - 96% goal met over past 5 days  - BG in desirable range    GI: no N/V/abd distention, rectal tube D/C 6/14  had BM today  continues on metamucil and Banatrol 2 times/day    Anthropometric Measurements:   Height (cm): 162.6 (05-30-20 @ 00:49), 152.4 (05-29-20 @ 19:18), 154.9 (05-12-20 @ 21:48)  Weight (kg): 99.6 (05-30-20 @ 00:49), 136.1 (05-29-20 @ 19:18), 98.2 (05-12-20 @ 23:57)  BMI (kg/m2): 37.7 (05-30-20 @ 00:49), 51.5 (05-30-20 @ 00:49), 58.6 (05-29-20 @ 19:18), 40.9 (05-12-20 @ 23:57)  IBW: 60 kg      Medications: MEDICATIONS  (STANDING):  amLODIPine   Tablet 10 milliGRAM(s) Oral daily  chlorhexidine 0.12% Liquid 15 milliLiter(s) Oral Mucosa every 12 hours  chlorhexidine 4% Liquid 1 Application(s) Topical <User Schedule>  dextrose 5%. 1000 milliLiter(s) (50 mL/Hr) IV Continuous <Continuous>  dextrose 50% Injectable 25 Gram(s) IV Push once  doxazosin 2 milliGRAM(s) Oral at bedtime  enoxaparin Injectable 30 milliGRAM(s) SubCutaneous two times a day  insulin lispro (HumaLOG) corrective regimen sliding scale   SubCutaneous every 6 hours  insulin NPH human recombinant 48 Unit(s) SubCutaneous <User Schedule>  insulin NPH human recombinant 40 Unit(s) SubCutaneous <User Schedule>  insulin NPH human recombinant 20 Unit(s) SubCutaneous <User Schedule>  lacosamide Solution 200 milliGRAM(s) Oral two times a day  levETIRAcetam  Solution 1000 milliGRAM(s) Oral two times a day  levothyroxine 150 MICROGram(s) Oral daily  metoprolol tartrate 25 milliGRAM(s) Oral every 8 hours  pantoprazole   Suspension 40 milliGRAM(s) Oral daily  predniSONE   Tablet 15 milliGRAM(s) Oral <User Schedule>  predniSONE   Tablet 5 milliGRAM(s) Oral at bedtime  psyllium Powder 1 Packet(s) Oral three times a day  simvastatin 20 milliGRAM(s) Oral at bedtime    MEDICATIONS  (PRN):  acetaminophen    Suspension .. 650 milliGRAM(s) Oral every 6 hours PRN Temp greater or equal to 38C (100.4F), Mild Pain (1 - 3)  sodium chloride 0.9% lock flush 10 milliLiter(s) IV Push every 1 hour PRN Pre/post blood products, medications, blood draw, and to maintain line patency    Labs:     Triglycerides, Serum: 326 mg/dL (05-30-20 @ 04:52)    POCT Blood Glucose.: 92 mg/dL (06-15-20 @ 11:22)  POCT Blood Glucose.: 161 mg/dL (06-15-20 @ 05:06)  POCT Blood Glucose.: 124 mg/dL (06-14-20 @ 23:08)    Skin: no pressure injuries documented   Edema: 2+ generalized    Estimated Needs: (based on upper  lbs / 59.8kg)  Energy: (30-35kcal/kg): 1794-2093kcal  Protein: (1.4-1.6g protein/kg): 94-96g protein     Previous Nutrition Diagnosis: increased needs  Nutrition Diagnosis is: ongoing, addressed w/ EN, meeting >80% estimated needs    New Nutrition Diagnosis:  none    Recommended Interventions:   1. Continue Glucerna 1.2 65 cc/hr x 24 hrs  2. Continue Banatrol 2 times/day      Monitoring and Evaluation:   Continue to monitor nutrition provision and tolerance, weights, labs, skin integrity.   RD remains available upon request and will follow up per protocol.

## 2020-06-15 NOTE — PROGRESS NOTE ADULT - PROBLEM SELECTOR PLAN 2
-Levothyroxine 150mcg daily  -Hold TF 1 hour prior and after levothyroxine given.    discussed w/NSCU team  pager: [phone]   cell: [phone]  office: [phone]    -I spent a total time of 25 mins with the patient at bedside/on unit of which more than 50% of time was spent on counseling/coordination of care.

## 2020-06-15 NOTE — PROGRESS NOTE ADULT - SUBJECTIVE AND OBJECTIVE BOX
HPI:  63 yo F with AMS since this morning.  Pt. was confused, didn't recognize son this morning.  He notes she was sleeping more than usual yesterday, which tipped off son that something was wrong.  At 11 am pt. was gasping for air like she was seizing. 2 hours ago pt. was dry heaving, her chest was going up and down and she started shaking like she was having a seizure.  Pt. has no history of seizures per son.  		PMH: hypothyroidism, HTN, IDDM, COPD/CHRIS on home O2, RA (on Prednisone), SBO s/p ex lap with lysis of adhesions c/b evisceration,  recent hospitalization in 3/2020 with encephalopathy, significantly hypothyroid (, without myxedema coma), small (stable) right sided SDH, UTI, catatonic reaction to VPA (now resolved) and DKA and psychiatric issues, 5/2020 hospitalization for GIB, Emphysematous cystitis, endometritis, Sigmoid colitis, morbid obesity, functional quadriplegia    HCP: Norris Rush, Son [phone]; Daughter, Heavenly Schmidt [phone]; son asks that only him or his sister, Heavenly be contacted for patient updates/information as they are both proxy's for patient (29 May 2020 23:41)    OVERNIGHT EVENTS: none    ICU Vital Signs Last 24 Hrs  T(C): 37.3 (15 Nile 2020 03:00), Max: 37.4 (14 Jun 2020 07:00)  T(F): 99.2 (15 Nile 2020 03:00), Max: 99.4 (14 Jun 2020 07:00)  HR: 104 (15 Nile 2020 04:54) (96 - 107)  BP: 116/50 (15 Nile 2020 03:00) (99/62 - 119/78)  BP(mean): 70 (15 Nile 2020 03:00) (70 - 88)  ABP: --  ABP(mean): --  RR: 18 (15 Nile 2020 03:00) (15 - 23)  SpO2: 100% (15 Nile 2020 04:54) (100% - 100%)      06-13-20 @ 07:01  -  06-14-20 @ 07:00  --------------------------------------------------------  IN: 1730 mL / OUT: 1800 mL / NET: -70 mL    06-14-20 @ 07:01  -  06-15-20 @ 06:56  --------------------------------------------------------  IN: 1855 mL / OUT: 1200 mL / NET: 655 mL      Mode: AC/ CMV (Assist Control/ Continuous Mandatory Ventilation), RR (machine): 14, TV (machine): 450, FiO2: 40, PEEP: 5, ITime: 0.8, MAP: 11, PIP: 25  acetaminophen    Suspension .. 650 milliGRAM(s) Oral every 6 hours PRN  amLODIPine   Tablet 10 milliGRAM(s) Oral daily  chlorhexidine 0.12% Liquid 15 milliLiter(s) Oral Mucosa every 12 hours  chlorhexidine 4% Liquid 1 Application(s) Topical <User Schedule>  dextrose 5%. 1000 milliLiter(s) (50 mL/Hr) IV Continuous <Continuous>  dextrose 50% Injectable 25 Gram(s) IV Push once  doxazosin 2 milliGRAM(s) Oral at bedtime  enoxaparin Injectable 30 milliGRAM(s) SubCutaneous two times a day  insulin lispro (HumaLOG) corrective regimen sliding scale   SubCutaneous every 6 hours  insulin NPH human recombinant 48 Unit(s) SubCutaneous <User Schedule>  insulin NPH human recombinant 40 Unit(s) SubCutaneous <User Schedule>  insulin NPH human recombinant 20 Unit(s) SubCutaneous <User Schedule>  lacosamide Solution 200 milliGRAM(s) Oral two times a day  levETIRAcetam  Solution 1000 milliGRAM(s) Oral two times a day  levothyroxine 150 MICROGram(s) Oral daily  metoprolol tartrate 25 milliGRAM(s) Oral every 8 hours  pantoprazole   Suspension 40 milliGRAM(s) Oral daily  predniSONE   Tablet 15 milliGRAM(s) Oral <User Schedule>  predniSONE   Tablet 5 milliGRAM(s) Oral at bedtime  psyllium Powder 1 Packet(s) Oral three times a day  simvastatin 20 milliGRAM(s) Oral at bedtime  sodium chloride 0.9% lock flush 10 milliLiter(s) IV Push every 1 hour PRN                          8.6    8.33  )-----------( 482      ( 13 Jun 2020 22:33 )             28.4     06-13    144  |  107  |  23  ----------------------------<  88  3.8   |  25  |  0.55    Ca    9.3      13 Jun 2020 22:33  Phos  3.2     06-13  Mg     2.1     06-13      Allergies    Depakote (Other)  Seroquel (Other (Severe))    CULTURES:  Culture Results:   >100,000 CFU/ml Escherichia coli ESBL (06-04 @ 01:09)  Culture Results:   No Growth Final (06-03 @ 15:00)    PHYSICAL EXAM:  Neurological:  intubated, eye open to noxious stimuli, disconjugated gaze, pupils 3mm reactive, +cough/gag, not follow commands, upper extremities tremors when stimulated, LE TF  Cardiovascular: +s1, s2  Respiratory: clear to auscultation b/l  Gastrointestinal: soft, non-distended, non-tender  abd: soft, non-tender, nl bs ,+ skin lesion  Extremities: Warm, dry  + skin tear on buttocks

## 2020-06-15 NOTE — PROGRESS NOTE ADULT - ATTENDING COMMENTS
patient examined at bedside  vitals/labs/meds/imaging reviewed  EO to stim, tracks with stim, BUE tremors, BLE TF    family meeting pending re: trach/peg  amlodipine with hold parameter  pressure support as tolerated, otherwise PRVC 14/400/5/40%  cont tube feeding, rectal tube d/c'ed today

## 2020-06-15 NOTE — PROGRESS NOTE ADULT - SUBJECTIVE AND OBJECTIVE BOX
HPI:  63 yo F with AMS since this morning.  Pt. was confused, didn't recognize son this morning.  He notes she was sleeping more than usual yesterday, which tipped off son that something was wrong.  At 11 am pt. was gasping for air like she was seizing. 2 hours ago pt. was dry heaving, her chest was going up and down and she started shaking like she was having a seizure.  Pt. has no history of seizures per son.  		PMH: hypothyroidism, HTN, IDDM, COPD/CHRIS on home O2, RA (on Prednisone), SBO s/p ex lap with lysis of adhesions c/b evisceration,  recent hospitalization in 3/2020 with encephalopathy, significantly hypothyroid (, without myxedema coma), small (stable) right sided SDH, UTI, catatonic reaction to VPA (now resolved) and DKA and psychiatric issues, 5/2020 hospitalization for GIB, Emphysematous cystitis, endometritis, Sigmoid colitis, morbid obesity, functional quadriplegia    HCP: Norris Rush, Son [phone]; Daughter, Heavenly Schmidt [phone]; son asks that only him or his sister, Heavenly be contacted for patient updates/information as they are both proxy's for patient (29 May 2020 23:41)      VITALS:  T(C): , Max: 37.3 (06-15-20 @ 03:00)  HR:  (86 - 104)  BP:  (103/66 - 167/84)  ABP: --  RR:  (15 - 27)  SpO2:  (100% - 100%)  Wt(kg): --  Device: Avea, Mode: CPAP with PS, TV (machine): 340, FiO2: 40, PEEP: 5, PS: 10, MAP: 10, PIP: 16    06-14-20 @ 07:01  -  06-15-20 @ 07:00  --------------------------------------------------------  IN: 1920 mL / OUT: 1200 mL / NET: 720 mL    06-15-20 @ 07:01  -  06-15-20 @ 20:21  --------------------------------------------------------  IN: 965 mL / OUT: 600 mL / NET: 365 mL      LABS:  Na: 144 (06-13 @ 22:33)  K: 3.8 (06-13 @ 22:33)  Cl: 107 (06-13 @ 22:33)  CO2: 25 (06-13 @ 22:33)  BUN: 23 (06-13 @ 22:33)  Cr: 0.55 (06-13 @ 22:33)  Glu: 88(06-13 @ 22:33)    Hgb: 8.6 (06-13 @ 22:33)  Hct: 28.4 (06-13 @ 22:33)  WBC: 8.33 (06-13 @ 22:33)  Plt: 482 (06-13 @ 22:33)      IMAGING:   Recent imaging studies were reviewed.    MEDICATIONS:  acetaminophen    Suspension .. 650 milliGRAM(s) Oral every 6 hours PRN  amLODIPine   Tablet 10 milliGRAM(s) Oral daily  chlorhexidine 0.12% Liquid 15 milliLiter(s) Oral Mucosa every 12 hours  chlorhexidine 4% Liquid 1 Application(s) Topical <User Schedule>  dextrose 5%. 1000 milliLiter(s) IV Continuous <Continuous>  dextrose 50% Injectable 25 Gram(s) IV Push once  doxazosin 2 milliGRAM(s) Oral at bedtime  enoxaparin Injectable 30 milliGRAM(s) SubCutaneous two times a day  insulin lispro (HumaLOG) corrective regimen sliding scale   SubCutaneous every 6 hours  insulin NPH human recombinant 40 Unit(s) SubCutaneous <User Schedule>  insulin NPH human recombinant 20 Unit(s) SubCutaneous <User Schedule>  lacosamide Solution 200 milliGRAM(s) Oral two times a day  levETIRAcetam  Solution 1000 milliGRAM(s) Oral two times a day  levothyroxine 150 MICROGram(s) Oral daily  metoprolol tartrate 25 milliGRAM(s) Oral every 8 hours  pantoprazole   Suspension 40 milliGRAM(s) Oral daily  predniSONE   Tablet 15 milliGRAM(s) Oral <User Schedule>  predniSONE   Tablet 5 milliGRAM(s) Oral at bedtime  psyllium Powder 1 Packet(s) Oral three times a day  simvastatin 20 milliGRAM(s) Oral at bedtime  sodium chloride 0.9% lock flush 10 milliLiter(s) IV Push every 1 hour PRN    PHYSICAL EXAM:  Neurological:  intubated, eye open to noxious stimuli, disconjugated gaze, pupils 3mm reactive, +cough/gag, not follow commands, upper extremities tremors when stimulated, LE TF  Cardiovascular: +s1, s2  Respiratory: clear to auscultation b/l  Gastrointestinal: soft, non-distended, non-tender  abd: soft, non-tender, nl bs ,+ skin lesion  Extremities: Warm, dry  + skin tear on buttocks

## 2020-06-15 NOTE — PROGRESS NOTE ADULT - ASSESSMENT
ASSESSMENT:  Left frontal intracerebral hemorrhage, generalized periodic discharges, UTI.       NEURO:  Neuro q4h checks  seizure management: Keppra and Vimpat  Pain management with Tylenol & fentanyl prn  Given previous psych history and workup not revealing any cause, NMDA encephalitis testing sent -> NMDA AB neg.    Trach and PEG being discussed with family. family meeting on tuesday 6/16/20.  Activity: [] OOB as tolerated [] Bedrest [X] PT [X] OT [] PMNR      PULM:  Intubated since 5/29- Day 16 of intubation. prvc 14/450/5/40  H/O of COPD   CPAP as tolerated. Has failed CPAP   Mild hypoxemia- likely atelectasis; will monitor        CV:  Keep -160  TTE negative for PFO  HTN: Continue norvasc   sinus tachycardia, metoprolol increased to 25 q8h overnight   Statin for HLD        RENAL:  IVL. Monitor Is/Os= even   Female iraheta  on cardura for urinary retention      GI:  Diet: On Glucerna OGT feeds  Rectal tube output for past 24 hours: 500ml. Continue Banana flakes and metamucil   GI prophylaxis [] not indicated [X] PO PPI [] other:        ENDO: S/P hyperglycemia - IR DM  Endo Following for adjustment of NPH; will follow up sugars tonight  Continue PO synthroid  Continue prednisone 15mg, 5 mg at bedtime (was on it at home for RA). Likely etiology of bruising  Goal euglycemia (-180)      HEME/ONC:  On SQL for DVT ppx- 30mg SQ q12.  VTE prophylaxis: [X] SCDs [X] chemoprophylaxis [X] high risk of DVT/PE on admission due to: bed bound at baseline  LE doppler 5/30- No DVT        ID:  Afebrile   Ertapnem for ESBL found growing in urine culture on 6/4 - will end today - 6/14      MISC: Palliative care consulted. Previous multiple family meetings- yield no decision. Ethics following  Family meeting was scheduled for 6/12 with daughter Heavenly, son (Norris), SW, Ethics and palliative care. However, son Norris stated that he could not get all family members to be a part of meeting and requested for it to be postponed until next Tuesday, 6/16      SOCIAL/FAMILY:  [X] updated family via phone  [] family meeting    CODE STATUS:  [X] Full Code     DISPOSITION:  [X] ICU    [X] Patient is at high risk of neurologic deterioration/death due to: resp failure, seizures, hemorrhage.     Time spent:  35 critical care minutes

## 2020-06-15 NOTE — PROGRESS NOTE ADULT - SUBJECTIVE AND OBJECTIVE BOX
Diabetes Follow up note:    Chief complaint: T2DM/steroid induced hyperglycemia    Interval Hx: BG values mostly at goal. 90s this afternoon at noontime. Remains w/out neuro change, intubated and tolerating TF @ goal.     Review of Systems:  unable to provide ROS. Sedated.     MEDS:  insulin lispro (HumaLOG) corrective regimen sliding scale   SubCutaneous every 6 hours  insulin NPH human recombinant 48 Unit(s) SubCutaneous <User Schedule>  insulin NPH human recombinant 40 Unit(s) SubCutaneous <User Schedule>  insulin NPH human recombinant 20 Unit(s) SubCutaneous <User Schedule>  levothyroxine 150 MICROGram(s) Oral daily  predniSONE   Tablet 15 milliGRAM(s) Oral <User Schedule>  predniSONE   Tablet 5 milliGRAM(s) Oral at bedtime  simvastatin 20 milliGRAM(s) Oral at bedtime      Allergies    Depakote (Other)  Seroquel (Other (Severe))      PE:  General: Female lying in bed. NAD.   Vital Signs Last 24 Hrs  T(C): 36.5 (15 Nile 2020 11:00), Max: 37.3 (15 Nile 2020 03:00)  T(F): 97.7 (15 Nile 2020 11:00), Max: 99.2 (15 Nile 2020 03:00)  HR: 90 (15 Nile 2020 15:00) (86 - 105)  BP: 167/84 (15 Nile 2020 11:00) (103/66 - 167/84)  BP(mean): 107 (15 Nile 2020 11:00) (70 - 107)  RR: 21 (15 Nile 2020 15:00) (15 - 23)  SpO2: 100% (15 Nile 2020 15:00) (100% - 100%)  HEENT: ETT/OG tube in place. On [facility] vent.   Abd: Soft, NT,ND, Obese.   Extremities: Warm  Neuro: Unresponsive.     LABS:  POCT Blood Glucose.: 92 mg/dL (06-15-20 @ 11:22)  POCT Blood Glucose.: 161 mg/dL (06-15-20 @ 05:06)  POCT Blood Glucose.: 124 mg/dL (06-14-20 @ 23:08)  POCT Blood Glucose.: 139 mg/dL (06-14-20 @ 12:05)  POCT Blood Glucose.: 166 mg/dL (06-14-20 @ 05:08)  POCT Blood Glucose.: 99 mg/dL (06-13-20 @ 23:07)  POCT Blood Glucose.: 169 mg/dL (06-13-20 @ 17:18)  POCT Blood Glucose.: 182 mg/dL (06-13-20 @ 11:17)  POCT Blood Glucose.: 196 mg/dL (06-13-20 @ 05:04)  POCT Blood Glucose.: 147 mg/dL (06-12-20 @ 23:36)  POCT Blood Glucose.: 140 mg/dL (06-12-20 @ 17:14)                            8.6    8.33  )-----------( 482      ( 13 Jun 2020 22:33 )             28.4       06-13    144  |  107  |  23  ----------------------------<  88  3.8   |  25  |  0.55    Ca    9.3      13 Jun 2020 22:33  Phos  3.2     06-13  Mg     2.1     06-13        Thyroid Function Tests:  06-11 @ 13:58 TSH 8.26 FreeT4 -- T3 55 Anti TPO -- Anti Thyroglobulin Ab -- TSI --  06-04 @ 01:58 TSH -- FreeT4 2.7 T3 -- Anti TPO -- Anti Thyroglobulin Ab -- TSI --      A1C with Estimated Average Glucose Result: 7.4 % (05-30-20 @ 04:43)  A1C with Estimated Average Glucose Result: 6.4 % (05-14-20 @ 16:23)  A1C with Estimated Average Glucose Result: 6.5 % (05-13-20 @ 08:24)  Hemoglobin A1C, Whole Blood: 9.0 % (01-28-20 @ 19:30)  Hemoglobin A1C, Whole Blood: 9.2 % (01-27-20 @ 17:36)          Contact number: beeper [phone] or [phone]

## 2020-06-15 NOTE — PROGRESS NOTE ADULT - ASSESSMENT
65 yo F well known by endo team from prior admission. Pt w/h/o uncontrolled T2DM with no known complications. Also h/o hypothyroidism/ul HTN/ COPD and RA on chronic steroids. Here with mental status changes associated with shaking found to have a left frontal parenchymal hematoma with mass effect on the left lateral ventricle and intraventricular extension with blood layering in the occipital horns. Endocrine following for hyperglycemia on chronic steroids bid.  Tolerating TFs with BG values mostly at goal on present insulin regimen. BG goal (100-180mg/dl). Awaiting family GOC decisions.

## 2020-06-15 NOTE — PROGRESS NOTE ADULT - PROBLEM SELECTOR PLAN 1
-test BG Q6h  -Adjust NPH as follows  -decrease NPH 44 units @ 6am  -c/w NPH 40 units @ 12pm  -c/w NPH 20 units @ 6pm/12am  -Hold NPH if TF off. Can reduce dose if BG less than 100mg/dl  -c/w Humalog moderate correction scale Q6h  Please notify endocrine if any changes in steroid regimen or TF regimen so we can adjust insulin regimen accordingly.

## 2020-06-15 NOTE — PROGRESS NOTE ADULT - ASSESSMENT
ASSESSMENT:  Left frontal intracerebral hemorrhage, generalized periodic discharges, UTI.       NEURO:  Neuro q4h checks  seizure management: Keppra and Vimpat  Pain management with Tylenol & fentanyl prn  Given previous psych history and workup not revealing any cause, NMDA encephalitis testing sent -> NMDA AB neg.    Trach and PEG being discussed with family. Family meeting on Tuesday 6/16/20.  Activity: [] OOB as tolerated [] Bedrest [X] PT [X] OT [] PMNR      PULM:  Intubated since 5/29- Day 16 of intubation. prvc 14/450/5/40  H/O of COPD   CPAP as tolerated. Has failed CPAP   Mild hypoxemia- likely atelectasis; will monitor      CV:  Keep -160  TTE negative for PFO  HTN: Continue norvasc   sinus tachycardia, metoprolol increased to 25 q8h overnight   Statin for HLD        RENAL:  IVL. Monitor Is/Os= even   Female iraheta  on cardura for urinary retention      GI:  Diet: On Glucerna OGT feeds  Rectal tube output for past 24 hours: 500ml. Continue Banana flakes and metamucil   GI prophylaxis [] not indicated [X] PO PPI [] other:        ENDO: S/P hyperglycemia - IR DM  Endo Following for adjustment of NPH; monitor FS  Continue PO synthroid  Continue prednisone 15mg, 5 mg at bedtime (was on it at home for RA). Likely etiology of bruising  Goal euglycemia (-180)      HEME/ONC:  On SQL for DVT ppx- 30mg SQ q12.  VTE prophylaxis: [X] SCDs [X] chemoprophylaxis [X] high risk of DVT/PE on admission due to: bed bound at baseline  LE doppler 5/30- No DVT        ID:  Afebrile   Ertapnem for ESBL found growing in urine culture on 6/4 - 6/14      MISC: Palliative care consulted. Previous multiple family meetings- yield no decision. Ethics following  Family meeting was scheduled for 6/12 with daughter Heavenly, son (Norris), BHAVIN, Ethics and palliative care. However, son Norris stated that he could not get all family members to be a part of meeting and requested for it to be postponed until tomorrow 6/16      SOCIAL/FAMILY:  [X] updated family via phone  [] family meeting    CODE STATUS:  [X] Full Code     DISPOSITION:  [X] ICU    [X] Patient is at high risk of neurologic deterioration/death due to: resp failure, seizures, hemorrhage.     Time spent:  35 critical care minutes

## 2020-06-16 LAB
GLUCOSE BLDC GLUCOMTR-MCNC: 101 MG/DL — HIGH (ref 70–99)
GLUCOSE BLDC GLUCOMTR-MCNC: 137 MG/DL — HIGH (ref 70–99)
GLUCOSE BLDC GLUCOMTR-MCNC: 152 MG/DL — HIGH (ref 70–99)
GLUCOSE BLDC GLUCOMTR-MCNC: 273 MG/DL — HIGH (ref 70–99)

## 2020-06-16 PROCEDURE — 99291 CRITICAL CARE FIRST HOUR: CPT

## 2020-06-16 PROCEDURE — 71045 X-RAY EXAM CHEST 1 VIEW: CPT | Mod: 26

## 2020-06-16 PROCEDURE — 99292 CRITICAL CARE ADDL 30 MIN: CPT

## 2020-06-16 PROCEDURE — 99233 SBSQ HOSP IP/OBS HIGH 50: CPT

## 2020-06-16 PROCEDURE — 99232 SBSQ HOSP IP/OBS MODERATE 35: CPT

## 2020-06-16 RX ORDER — HUMAN INSULIN 100 [IU]/ML
46 INJECTION, SUSPENSION SUBCUTANEOUS
Refills: 0 | Status: DISCONTINUED | OUTPATIENT
Start: 2020-06-16 | End: 2020-06-28

## 2020-06-16 RX ADMIN — CHLORHEXIDINE GLUCONATE 15 MILLILITER(S): 213 SOLUTION TOPICAL at 17:22

## 2020-06-16 RX ADMIN — HUMAN INSULIN 20 UNIT(S): 100 INJECTION, SUSPENSION SUBCUTANEOUS at 17:23

## 2020-06-16 RX ADMIN — HUMAN INSULIN 40 UNIT(S): 100 INJECTION, SUSPENSION SUBCUTANEOUS at 11:13

## 2020-06-16 RX ADMIN — Medication 15 MILLIGRAM(S): at 04:22

## 2020-06-16 RX ADMIN — CHLORHEXIDINE GLUCONATE 1 APPLICATION(S): 213 SOLUTION TOPICAL at 20:48

## 2020-06-16 RX ADMIN — Medication 150 MICROGRAM(S): at 04:19

## 2020-06-16 RX ADMIN — Medication 1 PACKET(S): at 20:51

## 2020-06-16 RX ADMIN — HUMAN INSULIN 46 UNIT(S): 100 INJECTION, SUSPENSION SUBCUTANEOUS at 06:44

## 2020-06-16 RX ADMIN — PANTOPRAZOLE SODIUM 40 MILLIGRAM(S): 20 TABLET, DELAYED RELEASE ORAL at 11:14

## 2020-06-16 RX ADMIN — LACOSAMIDE 200 MILLIGRAM(S): 50 TABLET ORAL at 04:39

## 2020-06-16 RX ADMIN — Medication 25 MILLIGRAM(S): at 04:40

## 2020-06-16 RX ADMIN — Medication 2: at 04:43

## 2020-06-16 RX ADMIN — Medication 25 MILLIGRAM(S): at 15:50

## 2020-06-16 RX ADMIN — LEVETIRACETAM 1000 MILLIGRAM(S): 250 TABLET, FILM COATED ORAL at 04:23

## 2020-06-16 RX ADMIN — ENOXAPARIN SODIUM 30 MILLIGRAM(S): 100 INJECTION SUBCUTANEOUS at 04:25

## 2020-06-16 RX ADMIN — Medication 2 MILLIGRAM(S): at 20:49

## 2020-06-16 RX ADMIN — ENOXAPARIN SODIUM 30 MILLIGRAM(S): 100 INJECTION SUBCUTANEOUS at 17:22

## 2020-06-16 RX ADMIN — Medication 6: at 11:13

## 2020-06-16 RX ADMIN — CHLORHEXIDINE GLUCONATE 15 MILLILITER(S): 213 SOLUTION TOPICAL at 04:22

## 2020-06-16 RX ADMIN — Medication 5 MILLIGRAM(S): at 20:50

## 2020-06-16 RX ADMIN — HUMAN INSULIN 44 UNIT(S): 100 INJECTION, SUSPENSION SUBCUTANEOUS at 04:41

## 2020-06-16 RX ADMIN — LACOSAMIDE 200 MILLIGRAM(S): 50 TABLET ORAL at 17:22

## 2020-06-16 RX ADMIN — SIMVASTATIN 20 MILLIGRAM(S): 20 TABLET, FILM COATED ORAL at 20:53

## 2020-06-16 RX ADMIN — AMLODIPINE BESYLATE 10 MILLIGRAM(S): 2.5 TABLET ORAL at 04:22

## 2020-06-16 RX ADMIN — Medication 1 PACKET(S): at 04:39

## 2020-06-16 RX ADMIN — Medication 25 MILLIGRAM(S): at 20:50

## 2020-06-16 RX ADMIN — LEVETIRACETAM 1000 MILLIGRAM(S): 250 TABLET, FILM COATED ORAL at 17:22

## 2020-06-16 RX ADMIN — Medication 1 PACKET(S): at 15:49

## 2020-06-16 NOTE — PROGRESS NOTE ADULT - PROBLEM SELECTOR PLAN 1
-test BG Q6h  -Pt with one elevated bs s/p reduction in dose yesterday from 48 to 44 units sq at 6am. Can give 46 units sq tomorrow at 6am.  -continue NPH 40 units sq @ 12pm and NPH 20 units sq @ 6pm/12am  -Hold NPH if TF off. Can reduce dose by 25% if BG less than 100mg/dl  -c/w Humalog moderate correction scale Q6h  Please notify endocrine if any changes in steroid regimen or TF regimen so we can adjust insulin regimen accordingly.

## 2020-06-16 NOTE — PROGRESS NOTE ADULT - ASSESSMENT
ASSESSMENT:  Left frontal intracerebral hemorrhage, generalized periodic discharges, UTI.       NEURO:  Neuro q4h checks  seizure management: Keppra and Vimpat  Pain management with Tylenol & fentanyl prn  Given previous psych history and workup not revealing any cause, NMDA encephalitis testing sent -> NMDA AB neg.    Trach and PEG being discussed with family vs palliative  Ethics consult appreciated, visit with family arranged for AM. Katelyn Burdick as decision maker.   Activity: [] OOB as tolerated [] Bedrest [X] PT [X] OT [] PMNR      PULM:  Intubated since 5/29- Day 18 of intubation. prvc 14/450/5/40  H/O of COPD   CPAP as tolerated. Has failed CPAP   Mild hypoxemia- likely atelectasis; will monitor        CV:  Keep -160  TTE negative for PFO  HTN: Continue norvasc   sinus tachycardia, metoprolol increased to 25 q8h overnight   Statin for HLD        RENAL:  IVL. Monitor Is/Os= even   Female iraheta  on cardura for urinary retention      GI:  Diet: On Glucerna OGT feeds  Rectal tube output for past 24 hours: 500ml. Continue Banana flakes and metamucil   GI prophylaxis [] not indicated [X] PO PPI [] other:        ENDO: S/P hyperglycemia - IR DM  Endo Following for adjustment of NPH; will follow up sugars tonight  Continue PO synthroid  Continue prednisone 15mg, 5 mg at bedtime (was on it at home for RA). Likely etiology of bruising  Goal euglycemia (-180)      HEME/ONC:  On SQL for DVT ppx- 30mg SQ q12.  VTE prophylaxis: [X] SCDs [X] chemoprophylaxis [X] high risk of DVT/PE on admission due to: bed bound at baseline  LE doppler 5/30- No DVT        ID:  Afebrile   Ertapnem for ESBL found growing in urine culture on 6/4 - ended on  - 6/14      MISC: Palliative care consulted. Previous multiple family meetings- yield no decision. Ethics following  Family meeting was scheduled for 6/12 with daughter Heavenly, son (Norris), SW, Ethics and palliative care. However, son Norris stated that he could not get all family members to be a part of meeting and requested for it to be postponed until  Tuesday, 6/16      SOCIAL/FAMILY:  [X] updated family via phone  [] family meeting    CODE STATUS:  [X] Full Code     DISPOSITION:  [X] ICU    [X] Patient is at high risk of neurologic deterioration/death due to: resp failure, seizures, hemorrhage.     Time spent:  30 critical care minutes

## 2020-06-16 NOTE — PROGRESS NOTE ADULT - PROBLEM SELECTOR PLAN 2
-Levothyroxine 150mcg daily  -Hold TF 1 hour prior and after levothyroxine given.  -Discussed with NP Renato pond w/NSCU team  pager: [phone]   cell: [phone]  office: [phone]    -I spent a total time of 25 mins with the patient at bedside/on unit of which more than 50% of time was spent on counseling/coordination of care.

## 2020-06-16 NOTE — PROGRESS NOTE ADULT - ASSESSMENT
65 yo F with uncontrolled T2DM with no known complications, hypothyroidism/ul HTN/ COPD and RA on chronic steroids here with mental status changes associated with shaking found to have a left frontal parenchymal hematoma with mass effect on the left lateral ventricle and intraventricular extension with blood layering in the occipital horns s/p intubation on TF.

## 2020-06-16 NOTE — CONSULT NOTE ADULT - SUBJECTIVE AND OBJECTIVE BOX
Consult requested by: RAMO Kowalski    Role: PA   Service: Neurosurgical     Contact#(s): [phone]  Attending: NATALEE Thomason [phone]  OTHER: ELIAZAR Armenta NP Palliative Medicine, Neuro Fellow RICHARD Ruggiero MD, Dr. Mar ([facility])    Consultant: Mega Quiroga, MPH, [facility] (Ethics Attending) [phone]  Consult purpose:   To assist the team in navigating the ethical complexities associated with a 64-year-old woman with intracranial hemorrhage whose decision makers are struggling end of life decisions.   CLINICAL SUMMARY  64 year old female admitted with change in mental status and possible seizure activity. Found on CT Scan to have left frontal intracerebral hemorrhage and cerebral edema which was not present on a February 20, 2020 CT Scan.  Patient intubated and sedated in neurosurgical ICU.   Patient and family are well known to the Ethics Service (Feb/March 2019 and January 2020) with a past medical history of functional paraplegia (bedbound), hypothyroidism, pulmonary hypertension (HTN), Diabetes Mellitus (DM) on insulin, COPD/CHRIS (on nocturnal CPAP and 3L home oxygen), Rheumatoid Arthritis on chronic prednisone, chronic tremors, Small Bowel Obstruction s/p exploratory laparotomy with lysis of adhesions (2018) (4 retention sutures) complicated by evisceration secondary to a coughing episode s/p exploratory laparotomy(2018) (6 retention sutures) complicated by multifocal pneumonia, developed hypercapnic respiratory failure requiring intubation 1/6/19, s/p bronch 1/9/19, and extubated on 1/24/19, course further complicated by Enterococci bacteremia. Ethics requested prior to extubation in January 2019 to explore tracheostomy and ana maría Pisano stating that his mother was adamant about not wanting a tracheostomy, forcing extended utilization of endotracheal tube. Subsequently patient was successfully extubated and discharged to skilled nursing facility. Subsequent Ethics consult in January 2020 revolved around the patient needing long term antibiotics as well as the need for restraints due to combativeness.   During this admission, the team has been primarily in contact with ana maría Pisano who has told the team on 6/4/2020 that he needs the weekend to discuss with family and for them to not call him. Team was concerned over loss of contact with surrogate decision maker and reached out to Ethics as a resource and an initial consult is charted.    Burdick Pullings was contacted and phone conference on 6/8/2020 is in detail below. Today, team was able to phone conference with both Heavenly and Norris.   Prognosis Estimate (survival in hrs, days, wks, mos, yrs):  guarded   Patient Decision-Making Capacity:  Has Capacity  No capacity- medically sedated and intubated  Patient Aware of:  Diagnosis:  Yes   No   Unknown    Prognosis:   Yes    No   Unknown       Name of medical decision-maker should patient lack capacity (relationship): Heavenly Hernández, Daughter [phone] and Norris Rush III, Son [phone]. Both named as equal decision makers in Living Will dated 9/3/19.  Role:   Health Care Agent     Legal Surrogate     Other Decision-Maker (i.e., HCA or Surrogate) Aware of:  Diagnosis: Yes   No      Prognosis:   Yes     No  Other Stake-Holders:  other adult children  Evidence of Patient’s Preference of Life-Sustaining Treatment (Written or Oral): LIVING WILL SEPT 3, 2019  Patient wants all aspects of full code but states "My wish is for all medical options to be exhausted before exploring any other medical options. My daughter Heavenly Hernández and son Norris Rush III must be informed and agree on other medical options which includes tracheostomy. I don’t give permission for any health care provider to issue a DNR/DNI order on my behalf and should not be a part of my medical records. I am full code all the way!"  Resuscitation status:   DNR:  Yes   No      DNI:  Yes   No            Discussion:   6/16/2020 [id number] (Team-Family Phone Conference) – Those in attendance from family daughter Heavenly, ana maría Pisano III, daughter Michelle, son Dhruv, son Wiliam, sister Diamond.  From hospital team Lilo Armenta, MYLES Palliative Medicine, Melanie Cerna,  and Ethics Attending Dr. Isabela Yao [facility]).    After introductions, Dr. Mar introduced self and provided clinical overview and explanation of current hospital course.  Review concerns from the family regarding the subdural hematoma from last admission.  Answered family’s questions about risk.  Discussed family’s interpretation of quality of life and what the patient would want (prolonged ventilator support).  Family discussed patient’s history with the hospitalizations.  MYLES Armenta discussed long-term care vs. palliative care plan of care.  Emphasized the goal of palliative care.  Multiple family members expressed the desire to see the patient at the bedside.  LMSW to arrange for family to visit patient.    Bioethics analysis: (unchanged)    The primary ethical principle involved in this case is respect for patient autonomy vs. beneficence and non-maleficence.     The bioethical principle of autonomy – here represented by a thoughtful consideration of the appropriate surrogate to make decisions for a patient without capacity. Physicians and health care providers have a role in protecting the patient from harm by safeguarding the patient’s best interests through determining the appropriate qualifications surrogate decision maker. In this case, the health team is applauded for creating an atmosphere of clarity in communication regarding finding the appropriate primary surrogate decision maker. The patient when she was able to do so, appointed both her son and daughter in a living will as her decision makers.     While this isn’t a [place] Health Care proxy, therefore not a medical legal document, it is clear and convincing evidence of the patient’s desires of  these two adult children from the rest of her adult children to follow through with her medical decisions.  The [facility] Family Health Care Decision Act specifically states through hierarchy that all adult children are a patient’s decision maker, making the assumption that that all have equal say in decisions. In this case Norris, even though named by his mother, after Heavenly has shown to be inconsistent, capricious and arbitrary when confronted with certain aspects of medical decisions that warrant his attention. This has been a consistent pattern of behavior which when the patient was able to speak for herself in past ethics consults, stated that Heavenly be her Health Care Proxy, but in deference to her son’s devotion, has allowed him to be alternate (and how it is written in the living will from 2019, Burdick first, Norris second.)      In this case, because of the family dynamics, the [facility] states that "one person" from the list can be appointed the surrogate. While the [facility] does not specify who identifies the surrogate when more than one person is in the highest category, it necessarily will be the responsibility of the hospital or nursing home to identify the surrogate. In most cases, this should be resolved without difficulty - usually the adult sons and daughters can agree upon the surrogate. In other cases it will be apparent to the hospital staff that one of the patient's adult children is best able to speak of the patient's previous wishes and, if the patient's wishes are not known, the patient's best interests. If there is a dispute, efforts should be made to resolve it informally if possible (e.g., through team meetings, ethics consultation or mediation or the hospital ethics process) or else the matter should be referred to the Ethics Review Committee.1     As Heavenly has been a steady and guiding voice, and was listed at [facility] on her mother’s living will it is prudent continue to contact and speak with Heavenly first in these delicate discussions.     Generally, the bioethical principle of beneficence promotes the best possible health or quality of living or dying: with aggressive interventions when these help prolong life with "good quality," with comfort care when cure and remission are not possible and the aim is to achieve the best "quality of life possible given the current circumstances". This principle tends to go hand-in-hand with the other bioethical principle non-maleficence, or do no harm. This principle promotes the physician’s virtue to provide care that wouldn’t cause harm to the patient or provide care that would not be medically beneficial to the patient.2    In keeping with the duty of non-maleficence, there is an ethical dilemma in this case between the technically possible (LSTs) and medically reasonable (best available evidence that the intervention will result in improved outcome). An acceptable outcome can be defined from the patient’s and families perspective of quality of life (the ability to engage in life tasks and derive satisfaction from doing so or by resulting functional status allows patient to engage in valued life tasks and derive sufficient satisfaction from doing so) Quality of life judgments rests in the judgment of the patient and surrogates reliable account of the patient’s valued life tasks and functional status. Physicians have no obligation to provide non-beneficial treatments without an expectation of benefit concordant with the goal of intervention. Another approach is to review this patient’s prognosis – progressive continual decline in health status regardless of intervention. 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 3    Physicians are under no legal or ethical obligation to offer treatments that would 1) provide no medically indicated benefit or 2) impose unnecessary risk or burden to the patient. This concept of proportionality encompasses cardiopulmonary resuscitation (CPR) and other life sustaining treatments as well. It is reasonable to consider palliation and burden of suffering. In honoring the patient’s moral status, the issue remains that the patient has a poor prognosis, will progressively deteriorate that will require comfort and ease of suffering.     Goals of care now are to maintain current treatment, providing Burdick with sufficient information to make an informed decision towards end of life that honors the patient’s moral status4.  The health team is commended for their virtue in providing intensive care treatment that has allowed this patient to survive to this point. In this specific case, in accordance with the PALLIATIVE CARE ACCESS ACT ([facility] SECTION 2997-D) The attending health care practitioner is required to provide patients with a terminal illness not only prognosis, risks and benefits of treatment options but also patient’s legal rights to symptom management at the end of life. Fundamental to a palliative approach is the aim to reduce suffering and improve the quality of life for patients and their families through identification and management of pain and physical, social, psychological, spiritual, Jehovah's witness and cultural needs.

## 2020-06-16 NOTE — PROGRESS NOTE ADULT - ASSESSMENT
ASSESSMENT:  Left frontal intracerebral hemorrhage, generalized periodic discharges, UTI.       NEURO:  Neuro q4h checks  seizure management: Keppra and Vimpat  Pain management with Tylenol & fentanyl prn  Given previous psych history and workup not revealing any cause, NMDA encephalitis testing sent -> NMDA AB neg.    Trach and PEG being discussed with family. family meeting  today.  Activity: [] OOB as tolerated [] Bedrest [X] PT [X] OT [] PMNR      PULM:  Intubated since 5/29- Day 18 of intubation. prvc 14/450/5/40  H/O of COPD   CPAP as tolerated. Has failed CPAP   Mild hypoxemia- likely atelectasis; will monitor        CV:  Keep -160  TTE negative for PFO  HTN: Continue norvasc   sinus tachycardia, metoprolol increased to 25 q8h overnight   Statin for HLD        RENAL:  IVL. Monitor Is/Os= even   Female iraheta  on cardura for urinary retention      GI:  Diet: On Glucerna OGT feeds  Rectal tube output for past 24 hours: 500ml. Continue Banana flakes and metamucil   GI prophylaxis [] not indicated [X] PO PPI [] other:        ENDO: S/P hyperglycemia - IR DM  Endo Following for adjustment of NPH; will follow up sugars tonight  Continue PO synthroid  Continue prednisone 15mg, 5 mg at bedtime (was on it at home for RA). Likely etiology of bruising  Goal euglycemia (-180)      HEME/ONC:  On SQL for DVT ppx- 30mg SQ q12.  VTE prophylaxis: [X] SCDs [X] chemoprophylaxis [X] high risk of DVT/PE on admission due to: bed bound at baseline  LE doppler 5/30- No DVT        ID:  Afebrile   Ertapnem for ESBL found growing in urine culture on 6/4 - ended on  - 6/14      MISC: Palliative care consulted. Previous multiple family meetings- yield no decision. Ethics following  Family meeting was scheduled for 6/12 with daughter Heavenly, son (Norris), BHAVIN, Ethics and palliative care. However, son Norris stated that he could not get all family members to be a part of meeting and requested for it to be postponed until  Tuesday, 6/16      SOCIAL/FAMILY:  [X] updated family via phone  [] family meeting    CODE STATUS:  [X] Full Code     DISPOSITION:  [X] ICU    [X] Patient is at high risk of neurologic deterioration/death due to: resp failure, seizures, hemorrhage.     Time spent:  35 critical care minutes

## 2020-06-16 NOTE — PROGRESS NOTE ADULT - PROBLEM SELECTOR PLAN 6
Extended family meeting to take place today at 2:30   Update to follow Conference call with patients children:  Heavenly (HCP) Kimberly (HCP), Dhruv Luevano Quizine, Poppy, Jadia, Julia, from the medical team,  Melanie Martin (), Ethics Dr. Quiroga and patients sister Edie.  Dr Mar described the catastrophic neurological event that has left patient dependent on artificial life supporting measures .  All questions and concerns addressed.  Family aware patient will never talk , walk or be independent.  Two choices discussed at length:  trach/peg and subsequent transfer to long term care facility for 24 hour care vs transfer to palliative care for compassionate extubation.   Many siblings appropriately tearful wanting to visit with their mother , BHAVIN Navarro to arrange for visitation.  Patient remains full code.    I will follow up tomorrow with Heavenly.    Debrief with Dr Mar , all in agreement that this writer reach out to Heavenly tomorrow afternoon for support and / or to discuss next steps.

## 2020-06-16 NOTE — PROGRESS NOTE ADULT - SUBJECTIVE AND OBJECTIVE BOX
Chief Complaint/Follow-up on: T2D/hypothyroidism    Subjective: Family mtg this afternoon to discuss goals of care with her family.       MEDICATIONS  (STANDING):  amLODIPine   Tablet 10 milliGRAM(s) Oral daily  chlorhexidine 0.12% Liquid 15 milliLiter(s) Oral Mucosa every 12 hours  chlorhexidine 4% Liquid 1 Application(s) Topical <User Schedule>  dextrose 5%. 1000 milliLiter(s) (50 mL/Hr) IV Continuous <Continuous>  dextrose 50% Injectable 25 Gram(s) IV Push once  doxazosin 2 milliGRAM(s) Oral at bedtime  enoxaparin Injectable 30 milliGRAM(s) SubCutaneous two times a day  insulin lispro (HumaLOG) corrective regimen sliding scale   SubCutaneous every 6 hours  insulin NPH human recombinant 40 Unit(s) SubCutaneous <User Schedule>  insulin NPH human recombinant 20 Unit(s) SubCutaneous <User Schedule>  insulin NPH human recombinant 44 Unit(s) SubCutaneous <User Schedule>  lacosamide Solution 200 milliGRAM(s) Oral two times a day  levETIRAcetam  Solution 1000 milliGRAM(s) Oral two times a day  levothyroxine 150 MICROGram(s) Oral daily  metoprolol tartrate 25 milliGRAM(s) Oral every 8 hours  pantoprazole   Suspension 40 milliGRAM(s) Oral daily  predniSONE   Tablet 15 milliGRAM(s) Oral <User Schedule>  predniSONE   Tablet 5 milliGRAM(s) Oral at bedtime  psyllium Powder 1 Packet(s) Oral three times a day  simvastatin 20 milliGRAM(s) Oral at bedtime    MEDICATIONS  (PRN):  acetaminophen    Suspension .. 650 milliGRAM(s) Oral every 6 hours PRN Temp greater or equal to 38C (100.4F), Mild Pain (1 - 3)  sodium chloride 0.9% lock flush 10 milliLiter(s) IV Push every 1 hour PRN Pre/post blood products, medications, blood draw, and to maintain line patency      PHYSICAL EXAM:  VITALS: T(C): 36.8 (06-16-20 @ 11:00)  T(F): 98.2 (06-16-20 @ 11:00), Max: 98.8 (06-15-20 @ 20:00)  HR: 104 (06-16-20 @ 12:17) (90 - 104)  BP: 122/77 (06-16-20 @ 11:00) (109/82 - 134/91)  RR:  (17 - 27)  SpO2:  (100% - 100%)  Wt(kg): --  GENERAL: intubated, sedated  EYES: closed, no lid lesions  RESPIRATORY: Clear to auscultation bilaterally; No rales, rhonchi, wheezing, or rubs  CARDIOVASCULAR: Regular rate and rhythm; No murmurs; no peripheral edema  GI: Soft, nontender, non distended, normal bowel sounds      POCT Blood Glucose.: 273 mg/dL (06-16-20 @ 11:10)  POCT Blood Glucose.: 152 mg/dL (06-16-20 @ 04:38)  POCT Blood Glucose.: 103 mg/dL (06-15-20 @ 22:45)  POCT Blood Glucose.: 88 mg/dL (06-15-20 @ 17:41)  POCT Blood Glucose.: 92 mg/dL (06-15-20 @ 11:22)  POCT Blood Glucose.: 161 mg/dL (06-15-20 @ 05:06)  POCT Blood Glucose.: 124 mg/dL (06-14-20 @ 23:08)  POCT Blood Glucose.: 139 mg/dL (06-14-20 @ 12:05)  POCT Blood Glucose.: 166 mg/dL (06-14-20 @ 05:08)  POCT Blood Glucose.: 99 mg/dL (06-13-20 @ 23:07)  POCT Blood Glucose.: 169 mg/dL (06-13-20 @ 17:18)    06-15    146<H>  |  109<H>  |  20  ----------------------------<  100<H>  3.9   |  25  |  0.54    EGFR if : 117  EGFR if non : 101    Ca    8.9      06-15  Mg     2.1     06-15  Phos  3.6     06-15            Thyroid Function Tests:  06-11 @ 13:58 TSH 8.26 T3 55   06-04 @ 01:58 FreeT4 2.7

## 2020-06-16 NOTE — CONSULT NOTE ADULT - ASSESSMENT
CONCLUSION  At the conclusion, of this team-family discussion, the family would like to visit the patient at bedside.  LMSW will arrange for family to visit patient at the bedside which would likely assist the bereavement process.    Regarding the placement of the endotracheal tube, though the patient has been intubated for longer than recommended1, the proportionality (beneficence/non-maleficence) analysis regarding the tracheostomy placement (and subsequent long-term care placement) vs. palliative care approach is contingent on the clinical goals of care.  Goals of care now are to maintain current treatment, providing Burdick with sufficient information to make an informed decision towards end of life that honors the patient’s moral status.  Due to the high number of loving family members in this patient’s life, the decision to pursue long-term care vs. palliative care are two equally undesirable choices that will impact the survivors’ lives forever.  Even without the decision to pursue full comfort/palliative care measures only, clinically and ethically the patient will benefit from the palliative approach, which is appropriate in this circumstance.    Ethics to remain available. Thank you for this challenging consult.     MORE THAN 50% OF THE TIME OF THIS CONSULTATION WAS SPENT IN COORDINATION OF CARE OF PATIENT

## 2020-06-16 NOTE — PROGRESS NOTE ADULT - ATTENDING COMMENTS
2:30pm family meeting pending  per report/ethics, patient would never wanted to be with tracheostomy and peg   clinically, in my opinion, patient is unlikely to maintain her airway and maintain stable respiratory status upon extubation  compassionate extubation with palliative care team would be appropriate  will discuss further with family/critically care team/SW/ethics/palliative

## 2020-06-16 NOTE — PROGRESS NOTE ADULT - SUBJECTIVE AND OBJECTIVE BOX
HPI:  65 yo F with AMS since this morning.  Pt. was confused, didn't recognize son this morning.  He notes she was sleeping more than usual yesterday, which tipped off son that something was wrong.  At 11 am pt. was gasping for air like she was seizing. 2 hours ago pt. was dry heaving, her chest was going up and down and she started shaking like she was having a seizure.  Pt. has no history of seizures per son.  		PMH: hypothyroidism, HTN, IDDM, COPD/CHRIS on home O2, RA (on Prednisone), SBO s/p ex lap with lysis of adhesions c/b evisceration,  recent hospitalization in 3/2020 with encephalopathy, significantly hypothyroid (, without myxedema coma), small (stable) right sided SDH, UTI, catatonic reaction to VPA (now resolved) and DKA and psychiatric issues, 5/2020 hospitalization for GIB, Emphysematous cystitis, endometritis, Sigmoid colitis, morbid obesity, functional quadriplegia    HCP: Norris Rush, Son [phone]; Daughter, Heavenly Schmidt [phone]; son asks that only him or his sister, Heavenly be contacted for patient updates/information as they are both proxy's for patient (29 May 2020 23:41)    OVERNIGHT EVENTS: none      ICU Vital Signs Last 24 Hrs  T(C): 36.9 (16 Jun 2020 04:00), Max: 37.1 (15 Nile 2020 20:00)  T(F): 98.4 (16 Jun 2020 04:00), Max: 98.8 (15 Nile 2020 20:00)  HR: 100 (16 Jun 2020 04:08) (90 - 103)  BP: 134/91 (16 Jun 2020 04:00) (109/82 - 167/84)  BP(mean): 105 (16 Jun 2020 04:00) (87 - 107)  ABP: --  ABP(mean): --  RR: 17 (16 Jun 2020 04:00) (17 - 27)  SpO2: 100% (16 Jun 2020 04:08) (100% - 100%)      06-15-20 @ 07:01  -  06-16-20 @ 07:00  --------------------------------------------------------  IN: 1885 mL / OUT: 700 mL / NET: 1185 mL      Mode: AC/ CMV (Assist Control/ Continuous Mandatory Ventilation), RR (machine): 14, TV (machine): 450, FiO2: 40, PEEP: 5, ITime: 1, MAP: 10, PIP: 23  acetaminophen    Suspension .. 650 milliGRAM(s) Oral every 6 hours PRN  amLODIPine   Tablet 10 milliGRAM(s) Oral daily  chlorhexidine 0.12% Liquid 15 milliLiter(s) Oral Mucosa every 12 hours  chlorhexidine 4% Liquid 1 Application(s) Topical <User Schedule>  dextrose 5%. 1000 milliLiter(s) (50 mL/Hr) IV Continuous <Continuous>  dextrose 50% Injectable 25 Gram(s) IV Push once  doxazosin 2 milliGRAM(s) Oral at bedtime  enoxaparin Injectable 30 milliGRAM(s) SubCutaneous two times a day  insulin lispro (HumaLOG) corrective regimen sliding scale   SubCutaneous every 6 hours  insulin NPH human recombinant 40 Unit(s) SubCutaneous <User Schedule>  insulin NPH human recombinant 20 Unit(s) SubCutaneous <User Schedule>  insulin NPH human recombinant 44 Unit(s) SubCutaneous <User Schedule>  lacosamide Solution 200 milliGRAM(s) Oral two times a day  levETIRAcetam  Solution 1000 milliGRAM(s) Oral two times a day  levothyroxine 150 MICROGram(s) Oral daily  metoprolol tartrate 25 milliGRAM(s) Oral every 8 hours  pantoprazole   Suspension 40 milliGRAM(s) Oral daily  predniSONE   Tablet 15 milliGRAM(s) Oral <User Schedule>  predniSONE   Tablet 5 milliGRAM(s) Oral at bedtime  psyllium Powder 1 Packet(s) Oral three times a day  simvastatin 20 milliGRAM(s) Oral at bedtime  sodium chloride 0.9% lock flush 10 milliLiter(s) IV Push every 1 hour PRN                          8.5    8.56  )-----------( 446      ( 15 Nile 2020 22:25 )             27.6     06-15    146<H>  |  109<H>  |  20  ----------------------------<  100<H>  3.9   |  25  |  0.54    Ca    8.9      15 Jun 2020 22:25  Phos  3.6     06-15  Mg     2.1     06-15      Allergies    Depakote (Other)  Seroquel (Other (Severe))    CULTURES:  Culture Results:   >100,000 CFU/ml Escherichia coli ESBL (06-04 @ 01:09)  Culture Results:   No Growth Final (06-03 @ 15:00)    PHYSICAL EXAM:  Neurological:  intubated, eye open to noxious stimuli, disconjugated gaze, pupils 3mm reactive, +cough/gag, not follow commands, upper extremities tremors when stimulated, LE TF  Cardiovascular: +s1, s2  Respiratory: clear to auscultation b/l  Gastrointestinal: soft, non-distended, non-tender  abd: soft, non-tender, nl bs ,+ skin lesion  Extremities: Warm, dry  + skin tear on buttocks

## 2020-06-16 NOTE — PROGRESS NOTE ADULT - SUBJECTIVE AND OBJECTIVE BOX
HPI:  63 yo F with AMS since this morning.  Pt. was confused, didn't recognize son this morning.  He notes she was sleeping more than usual yesterday, which tipped off son that something was wrong.  At 11 am pt. was gasping for air like she was seizing. 2 hours ago pt. was dry heaving, her chest was going up and down and she started shaking like she was having a seizure.  Pt. has no history of seizures per son.  		PMH: hypothyroidism, HTN, IDDM, COPD/CHRIS on home O2, RA (on Prednisone), SBO s/p ex lap with lysis of adhesions c/b evisceration,  recent hospitalization in 3/2020 with encephalopathy, significantly hypothyroid (, without myxedema coma), small (stable) right sided SDH, UTI, catatonic reaction to VPA (now resolved) and DKA and psychiatric issues, 5/2020 hospitalization for GIB, Emphysematous cystitis, endometritis, Sigmoid colitis, morbid obesity, functional quadriplegia    HCP: Norris Rush, Son [phone]; Daughter, Heavenly Schmidt [phone]; son asks that only him or his sister, Heavenly be contacted for patient updates/information as they are both proxy's for patient (29 May 2020 23:41)    VITALS:  T(C): , Max: 37.1 (06-15-20 @ 20:00)  HR:  (90 - 104)  BP:  (109/82 - 134/91)  ABP: --  RR:  (17 - 25)  SpO2:  (99% - 100%)  Wt(kg): --  Device: Avea, Mode: AC/ CMV (Assist Control/ Continuous Mandatory Ventilation), RR (machine): 14, TV (machine): 450, FiO2: 40, PEEP: 5, ITime: 0.8, MAP: 9, PIP: 21    06-15-20 @ 07:01  -  06-16-20 @ 07:00  --------------------------------------------------------  IN: 1885 mL / OUT: 700 mL / NET: 1185 mL    06-16-20 @ 07:01  -  06-16-20 @ 19:09  --------------------------------------------------------  IN: 775 mL / OUT: 600 mL / NET: 175 mL      LABS:  Na: 146 (06-15 @ 22:25), 144 (06-13 @ 22:33)  K: 3.9 (06-15 @ 22:25), 3.8 (06-13 @ 22:33)  Cl: 109 (06-15 @ 22:25), 107 (06-13 @ 22:33)  CO2: 25 (06-15 @ 22:25), 25 (06-13 @ 22:33)  BUN: 20 (06-15 @ 22:25), 23 (06-13 @ 22:33)  Cr: 0.54 (06-15 @ 22:25), 0.55 (06-13 @ 22:33)  Glu: 100(06-15 @ 22:25), 88(06-13 @ 22:33)    Hgb: 8.5 (06-15 @ 22:25), 8.6 (06-13 @ 22:33)  Hct: 27.6 (06-15 @ 22:25), 28.4 (06-13 @ 22:33)  WBC: 8.56 (06-15 @ 22:25), 8.33 (06-13 @ 22:33)  Plt: 446 (06-15 @ 22:25), 482 (06-13 @ 22:33)      IMAGING:   Recent imaging studies were reviewed.    MEDICATIONS:  acetaminophen    Suspension .. 650 milliGRAM(s) Oral every 6 hours PRN  amLODIPine   Tablet 10 milliGRAM(s) Oral daily  chlorhexidine 0.12% Liquid 15 milliLiter(s) Oral Mucosa every 12 hours  chlorhexidine 4% Liquid 1 Application(s) Topical <User Schedule>  dextrose 5%. 1000 milliLiter(s) IV Continuous <Continuous>  dextrose 50% Injectable 25 Gram(s) IV Push once  doxazosin 2 milliGRAM(s) Oral at bedtime  enoxaparin Injectable 30 milliGRAM(s) SubCutaneous two times a day  insulin lispro (HumaLOG) corrective regimen sliding scale   SubCutaneous every 6 hours  insulin NPH human recombinant 40 Unit(s) SubCutaneous <User Schedule>  insulin NPH human recombinant 20 Unit(s) SubCutaneous <User Schedule>  insulin NPH human recombinant 46 Unit(s) SubCutaneous <User Schedule>  lacosamide Solution 200 milliGRAM(s) Oral two times a day  levETIRAcetam  Solution 1000 milliGRAM(s) Oral two times a day  levothyroxine 150 MICROGram(s) Oral daily  metoprolol tartrate 25 milliGRAM(s) Oral every 8 hours  pantoprazole   Suspension 40 milliGRAM(s) Oral daily  predniSONE   Tablet 15 milliGRAM(s) Oral <User Schedule>  predniSONE   Tablet 5 milliGRAM(s) Oral at bedtime  psyllium Powder 1 Packet(s) Oral three times a day  simvastatin 20 milliGRAM(s) Oral at bedtime  sodium chloride 0.9% lock flush 10 milliLiter(s) IV Push every 1 hour PRN    PHYSICAL EXAM:  Neurological:  intubated, eye open to noxious stimuli, disconjugated gaze, pupils 3mm reactive, +cough/gag, not follow commands, upper extremities tremors when stimulated, LE TF  Cardiovascular: +s1, s2  Respiratory: clear to auscultation b/l  Gastrointestinal: soft, non-distended, non-tender  abd: soft, non-tender, nl bs ,+ skin lesion  Extremities: Warm, dry  + skin tear on buttocks

## 2020-06-16 NOTE — PROGRESS NOTE ADULT - SUBJECTIVE AND OBJECTIVE BOX
SUBJECTIVE AND OBJECTIVE:  INTERVAL HPI/OVERNIGHT EVENTS:  Patient remains unresponsive, vent dependent with poor neuro exam  DNR on chart:   Allergies    Depakote (Other)  Seroquel (Other (Severe))    Intolerances    MEDICATIONS  (STANDING):  amLODIPine   Tablet 10 milliGRAM(s) Oral daily  chlorhexidine 0.12% Liquid 15 milliLiter(s) Oral Mucosa every 12 hours  chlorhexidine 4% Liquid 1 Application(s) Topical <User Schedule>  dextrose 5%. 1000 milliLiter(s) (50 mL/Hr) IV Continuous <Continuous>  dextrose 50% Injectable 25 Gram(s) IV Push once  doxazosin 2 milliGRAM(s) Oral at bedtime  enoxaparin Injectable 30 milliGRAM(s) SubCutaneous two times a day  insulin lispro (HumaLOG) corrective regimen sliding scale   SubCutaneous every 6 hours  insulin NPH human recombinant 40 Unit(s) SubCutaneous <User Schedule>  insulin NPH human recombinant 20 Unit(s) SubCutaneous <User Schedule>  insulin NPH human recombinant 44 Unit(s) SubCutaneous <User Schedule>  lacosamide Solution 200 milliGRAM(s) Oral two times a day  levETIRAcetam  Solution 1000 milliGRAM(s) Oral two times a day  levothyroxine 150 MICROGram(s) Oral daily  metoprolol tartrate 25 milliGRAM(s) Oral every 8 hours  pantoprazole   Suspension 40 milliGRAM(s) Oral daily  predniSONE   Tablet 15 milliGRAM(s) Oral <User Schedule>  predniSONE   Tablet 5 milliGRAM(s) Oral at bedtime  psyllium Powder 1 Packet(s) Oral three times a day  simvastatin 20 milliGRAM(s) Oral at bedtime    MEDICATIONS  (PRN):  acetaminophen    Suspension .. 650 milliGRAM(s) Oral every 6 hours PRN Temp greater or equal to 38C (100.4F), Mild Pain (1 - 3)  sodium chloride 0.9% lock flush 10 milliLiter(s) IV Push every 1 hour PRN Pre/post blood products, medications, blood draw, and to maintain line patency      ITEMS UNCHECKED ARE NOT PRESENT    PRESENT SYMPTOMS: [ x]Unable to obtain due to poor mentation   Source if other than patient:  [ ]Family   [ ]Team     Pain:  [ ]yes [ ]no  QOL impact -   Location -                    Aggravating factors -  Quality -  Radiation -  Timing-  Severity (0-10 scale):  Minimal acceptable level (0-10 scale):     Dyspnea:                           [ ]Mild [ ]Moderate [ ]Severe  Anxiety:                             [ ]Mild [ ]Moderate [ ]Severe  Fatigue:                             [ ]Mild [ ]Moderate [ ]Severe  Nausea:                             [ ]Mild [ ]Moderate [ ]Severe  Loss of appetite:              [ ]Mild [ ]Moderate [ ]Severe  Constipation:                    [ ]Mild [ ]Moderate [ ]Severe    PAIN AD Score:	0  [email or web address] (Ctrl + left click to view)    Other Symptoms:  [ ]All other review of systems negative     Palliative Performance Status Version 2:       10  %      [email or web address]/[email or web address]_[email or web address]  PHYSICAL EXAM:  Vital Signs Last 24 Hrs  T(C): 36.8 (16 Jun 2020 11:00), Max: 37.1 (15 Nile 2020 20:00)  T(F): 98.2 (16 Jun 2020 11:00), Max: 98.8 (15 Nile 2020 20:00)  HR: 104 (16 Jun 2020 12:17) (90 - 104)  BP: 122/77 (16 Jun 2020 11:00) (109/82 - 134/91)  BP(mean): 89 (16 Jun 2020 11:00) (86 - 105)  RR: 25 (16 Jun 2020 11:00) (17 - 27)  SpO2: 100% (16 Jun 2020 12:17) (100% - 100%) I&O's Summary    15 Nile 2020 07:01  -  16 Jun 2020 07:00  --------------------------------------------------------  IN: 1885 mL / OUT: 700 mL / NET: 1185 mL    16 Jun 2020 07:01  -  16 Jun 2020 14:06  --------------------------------------------------------  IN: 450 mL / OUT: 0 mL / NET: 450 mL       GENERAL:  [ ]Alert  [ ]Oriented x   [ x]Lethargic  [ ]Cachexia  [ ]Unarousable  [ ]Verbal  [x ]Non-Verbal  Behavioral:   [ ]Anxiety  [ ]Delirium [ ]Agitation [ ]Other  HEENT:  [ ]Normal   [ ]Dry mouth   [x ]ET Tube/Trach  [ ]Oral lesions  PULMONARY:   [ ]Clear [ ]Tachypnea  [ ]Audible excessive secretions   [ ]Rhonchi        [ ]Right [ ]Left [ ]Bilateral  [ ]Crackles        [ ]Right [ ]Left [ ]Bilateral  [ ]Wheezing     [ ]Right [ ]Left [ ]Bilateral  [ ]Diminished BS [ ] Right [ ]Left [ ]Bilateral  CARDIOVASCULAR:    [ ]Regular [ x]Irregular [ x]Tachy  [ ]Ubaldo [ ]Murmur [ ]Other  GASTROINTESTINAL:  [x ]Soft  [ ]Distended   [x ]+BS  [ ]Non tender [ ]Tender  [ ]PEG [ x]OGT/ NGT   Last BM:   06-09-20 @ 07:01  -  06-10-20 @ 07:00  --------------------------------------------------------  OUT: 303 mL    06-10-20 @ 07:01  -  06-11-20 @ 07:00  --------------------------------------------------------  OUT: 400 mL    06-11-20 @ 07:01  -  06-12-20 @ 07:00  --------------------------------------------------------  OUT: 550 mL    06-12-20 @ 07:01  -  06-13-20 @ 07:00  --------------------------------------------------------  OUT: 600 mL    06-13-20 @ 07:01 - 06-14-20 @ 07:00  --------------------------------------------------------  OUT: 500 mL       GENITOURINARY:  [ ]Normal [x ]Incontinent   [ ]Oliguria/Anuria   [ ]Antoine  MUSCULOSKELETAL:   [ ]Normal   [ ]Weakness  [x ]Bed/Wheelchair bound [ ]Edema  NEUROLOGIC:   [ ]No focal deficits  [ x] Cognitive impairment  [ ] Dysphagia [ ]Dysarthria [ ] Paresis [ ]Other   SKIN:   [ ]Normal  [ ]Rash   [ ]Pressure ulcer(s) [ ]y [ ]n present on admission    CRITICAL CARE:  [ ]Shock Present  [ ]Septic [ ]Cardiogenic [ ]Neurologic [ ]Hypovolemic  [ ]Vasopressors [ ]Inotropes  [ x]Respiratory failure present x[ ]Mechanical Ventilation [ ]Non-invasive ventilatory support [ ]High-Flow  [x ]Acute  [ x]Chronic [ ]Hypoxic  [ ]Hypercarbic [ ]Other  [ ]Other organ failure     LABS:                        8.5    8.56  )-----------( 446      ( 15 Nile 2020 22:25 )             27.6   06-15    146<H>  |  109<H>  |  20  ----------------------------<  100<H>  3.9   |  25  |  0.54    Ca    8.9      15 Nile 2020 22:25  Phos  3.6     06-15  Mg     2.1     06-15          RADIOLOGY & ADDITIONAL STUDIES:      < from: Xray Chest 1 View- PORTABLE-Routine (06.16.20 @ 06:12) >        INTERPRETATION:  EXAMINATION: XR CHEST    CLINICAL INDICATION: Intubated patient    TECHNIQUE: Single portable view of the chest was obtained.    COMPARISON: Chest x-ray acquired 6/15/2020.    FINDINGS:   Evaluation lung fields limited as the left costodiaphragmatic angle is, and out of the field-of-view.    The cardiomediastinal silhouette is not well evaluated in this projection.    Right-sided IJ catheter is present with tip at the level the SVC    ET tube is present below the level of thoracic inlet and above the corwin.    Enteric catheter is present courses below the level the diaphragm the tip is not fully visualized in the present examination.    No focal consolidations, large pleural effusions or pneumothoraces within the visualized lungs.    Degenerative changes of the spine.    IMPRESSION:   Lines and tubes as above.    Stable examination from prior within the visualized portions of the lungs.        < end of copied text >    Protein Calorie Malnutrition Present: [ ]mild [ ]moderate [ ]severe [ ]underweight [ ]morbid obesity  https://www.andeal.org/vault/2440/web/files/ONC/Table_Clinical%20Characteristics%20to%20Document%20Malnutrition-White%20JV%20et%20al%202012.pdf    Height (cm): 162.6 (05-30-20 @ 00:49), 152.4 (05-29-20 @ 19:18), 154.9 (05-12-20 @ 21:48)  Weight (kg): 99.6 (05-30-20 @ 00:49), 136.1 (05-29-20 @ 19:18), 98.2 (05-12-20 @ 23:57)  BMI (kg/m2): 37.7 (05-30-20 @ 00:49), 51.5 (05-30-20 @ 00:49), 58.6 (05-29-20 @ 19:18)    [ ]PPSV2 < or = 30%  [ ]significant weight loss [ ]poor nutritional intake [ ]anasarca   Albumin, Serum: 2.7 g/dL (05-29-20 @ 20:22)   [ ]Artificial Nutrition    REFERRALS:   [ ]Chaplaincy  [ ]Hospice  [ ]Child Life  [ x]Social Work  [ ]Case management [ ]Holistic Therapy     Goals of Care Document:

## 2020-06-17 LAB
ANION GAP SERPL CALC-SCNC: 13 MMOL/L — SIGNIFICANT CHANGE UP (ref 5–17)
BUN SERPL-MCNC: 21 MG/DL — SIGNIFICANT CHANGE UP (ref 7–23)
CALCIUM SERPL-MCNC: 9 MG/DL — SIGNIFICANT CHANGE UP (ref 8.4–10.5)
CHLORIDE SERPL-SCNC: 108 MMOL/L — SIGNIFICANT CHANGE UP (ref 96–108)
CO2 SERPL-SCNC: 24 MMOL/L — SIGNIFICANT CHANGE UP (ref 22–31)
CREAT SERPL-MCNC: 0.56 MG/DL — SIGNIFICANT CHANGE UP (ref 0.5–1.3)
GLUCOSE BLDC GLUCOMTR-MCNC: 106 MG/DL — HIGH (ref 70–99)
GLUCOSE BLDC GLUCOMTR-MCNC: 118 MG/DL — HIGH (ref 70–99)
GLUCOSE BLDC GLUCOMTR-MCNC: 122 MG/DL — HIGH (ref 70–99)
GLUCOSE BLDC GLUCOMTR-MCNC: 129 MG/DL — HIGH (ref 70–99)
GLUCOSE BLDC GLUCOMTR-MCNC: 154 MG/DL — HIGH (ref 70–99)
GLUCOSE SERPL-MCNC: 105 MG/DL — HIGH (ref 70–99)
HCT VFR BLD CALC: 28.8 % — LOW (ref 34.5–45)
HGB BLD-MCNC: 8.6 G/DL — LOW (ref 11.5–15.5)
MAGNESIUM SERPL-MCNC: 2.2 MG/DL — SIGNIFICANT CHANGE UP (ref 1.6–2.6)
MCHC RBC-ENTMCNC: 29 PG — SIGNIFICANT CHANGE UP (ref 27–34)
MCHC RBC-ENTMCNC: 29.9 GM/DL — LOW (ref 32–36)
MCV RBC AUTO: 97 FL — SIGNIFICANT CHANGE UP (ref 80–100)
NRBC # BLD: 0 /100 WBCS — SIGNIFICANT CHANGE UP (ref 0–0)
PHOSPHATE SERPL-MCNC: 3.5 MG/DL — SIGNIFICANT CHANGE UP (ref 2.5–4.5)
PLATELET # BLD AUTO: 424 K/UL — HIGH (ref 150–400)
POTASSIUM SERPL-MCNC: 4 MMOL/L — SIGNIFICANT CHANGE UP (ref 3.5–5.3)
POTASSIUM SERPL-SCNC: 4 MMOL/L — SIGNIFICANT CHANGE UP (ref 3.5–5.3)
RBC # BLD: 2.97 M/UL — LOW (ref 3.8–5.2)
RBC # FLD: 19 % — HIGH (ref 10.3–14.5)
SODIUM SERPL-SCNC: 145 MMOL/L — SIGNIFICANT CHANGE UP (ref 135–145)
WBC # BLD: 8.45 K/UL — SIGNIFICANT CHANGE UP (ref 3.8–10.5)
WBC # FLD AUTO: 8.45 K/UL — SIGNIFICANT CHANGE UP (ref 3.8–10.5)

## 2020-06-17 PROCEDURE — 71045 X-RAY EXAM CHEST 1 VIEW: CPT | Mod: 26,77

## 2020-06-17 PROCEDURE — 71045 X-RAY EXAM CHEST 1 VIEW: CPT | Mod: 26

## 2020-06-17 PROCEDURE — 99232 SBSQ HOSP IP/OBS MODERATE 35: CPT

## 2020-06-17 PROCEDURE — 99292 CRITICAL CARE ADDL 30 MIN: CPT

## 2020-06-17 PROCEDURE — 99291 CRITICAL CARE FIRST HOUR: CPT

## 2020-06-17 RX ORDER — SCOPALAMINE 1 MG/3D
1 PATCH, EXTENDED RELEASE TRANSDERMAL
Refills: 0 | Status: DISCONTINUED | OUTPATIENT
Start: 2020-06-17 | End: 2020-06-25

## 2020-06-17 RX ORDER — FENTANYL CITRATE 50 UG/ML
25 INJECTION INTRAVENOUS EVERY 4 HOURS
Refills: 0 | Status: DISCONTINUED | OUTPATIENT
Start: 2020-06-17 | End: 2020-06-19

## 2020-06-17 RX ORDER — HUMAN INSULIN 100 [IU]/ML
35 INJECTION, SUSPENSION SUBCUTANEOUS
Refills: 0 | Status: DISCONTINUED | OUTPATIENT
Start: 2020-06-17 | End: 2020-06-27

## 2020-06-17 RX ORDER — SCOPALAMINE 1 MG/3D
1 PATCH, EXTENDED RELEASE TRANSDERMAL
Refills: 0 | Status: DISCONTINUED | OUTPATIENT
Start: 2020-06-17 | End: 2020-06-17

## 2020-06-17 RX ORDER — HUMAN INSULIN 100 [IU]/ML
15 INJECTION, SUSPENSION SUBCUTANEOUS
Refills: 0 | Status: DISCONTINUED | OUTPATIENT
Start: 2020-06-17 | End: 2020-06-18

## 2020-06-17 RX ORDER — HUMAN INSULIN 100 [IU]/ML
10 INJECTION, SUSPENSION SUBCUTANEOUS ONCE
Refills: 0 | Status: COMPLETED | OUTPATIENT
Start: 2020-06-17 | End: 2020-06-17

## 2020-06-17 RX ADMIN — Medication 1 PACKET(S): at 23:45

## 2020-06-17 RX ADMIN — CHLORHEXIDINE GLUCONATE 15 MILLILITER(S): 213 SOLUTION TOPICAL at 17:06

## 2020-06-17 RX ADMIN — LEVETIRACETAM 1000 MILLIGRAM(S): 250 TABLET, FILM COATED ORAL at 17:08

## 2020-06-17 RX ADMIN — Medication 5 MILLIGRAM(S): at 23:44

## 2020-06-17 RX ADMIN — Medication 25 MILLIGRAM(S): at 13:18

## 2020-06-17 RX ADMIN — LACOSAMIDE 200 MILLIGRAM(S): 50 TABLET ORAL at 17:30

## 2020-06-17 RX ADMIN — CHLORHEXIDINE GLUCONATE 15 MILLILITER(S): 213 SOLUTION TOPICAL at 05:17

## 2020-06-17 RX ADMIN — CHLORHEXIDINE GLUCONATE 1 APPLICATION(S): 213 SOLUTION TOPICAL at 22:00

## 2020-06-17 RX ADMIN — HUMAN INSULIN 46 UNIT(S): 100 INJECTION, SUSPENSION SUBCUTANEOUS at 05:26

## 2020-06-17 RX ADMIN — HUMAN INSULIN 15 UNIT(S): 100 INJECTION, SUSPENSION SUBCUTANEOUS at 17:08

## 2020-06-17 RX ADMIN — Medication 15 MILLIGRAM(S): at 05:17

## 2020-06-17 RX ADMIN — FENTANYL CITRATE 25 MICROGRAM(S): 50 INJECTION INTRAVENOUS at 02:33

## 2020-06-17 RX ADMIN — HUMAN INSULIN 10 UNIT(S): 100 INJECTION, SUSPENSION SUBCUTANEOUS at 00:10

## 2020-06-17 RX ADMIN — Medication 2 MILLIGRAM(S): at 23:51

## 2020-06-17 RX ADMIN — Medication 150 MICROGRAM(S): at 06:27

## 2020-06-17 RX ADMIN — LEVETIRACETAM 1000 MILLIGRAM(S): 250 TABLET, FILM COATED ORAL at 05:17

## 2020-06-17 RX ADMIN — AMLODIPINE BESYLATE 10 MILLIGRAM(S): 2.5 TABLET ORAL at 05:16

## 2020-06-17 RX ADMIN — Medication 25 MILLIGRAM(S): at 23:44

## 2020-06-17 RX ADMIN — PANTOPRAZOLE SODIUM 40 MILLIGRAM(S): 20 TABLET, DELAYED RELEASE ORAL at 11:41

## 2020-06-17 RX ADMIN — ENOXAPARIN SODIUM 30 MILLIGRAM(S): 100 INJECTION SUBCUTANEOUS at 17:06

## 2020-06-17 RX ADMIN — ENOXAPARIN SODIUM 30 MILLIGRAM(S): 100 INJECTION SUBCUTANEOUS at 05:16

## 2020-06-17 RX ADMIN — HUMAN INSULIN 35 UNIT(S): 100 INJECTION, SUSPENSION SUBCUTANEOUS at 13:16

## 2020-06-17 RX ADMIN — Medication 2: at 05:26

## 2020-06-17 RX ADMIN — LACOSAMIDE 200 MILLIGRAM(S): 50 TABLET ORAL at 05:17

## 2020-06-17 RX ADMIN — FENTANYL CITRATE 25 MICROGRAM(S): 50 INJECTION INTRAVENOUS at 10:21

## 2020-06-17 RX ADMIN — Medication 1 PACKET(S): at 13:16

## 2020-06-17 RX ADMIN — Medication 650 MILLIGRAM(S): at 02:01

## 2020-06-17 RX ADMIN — HUMAN INSULIN 15 UNIT(S): 100 INJECTION, SUSPENSION SUBCUTANEOUS at 23:49

## 2020-06-17 RX ADMIN — FENTANYL CITRATE 25 MICROGRAM(S): 50 INJECTION INTRAVENOUS at 22:22

## 2020-06-17 RX ADMIN — SIMVASTATIN 20 MILLIGRAM(S): 20 TABLET, FILM COATED ORAL at 23:44

## 2020-06-17 RX ADMIN — Medication 1 PACKET(S): at 05:17

## 2020-06-17 NOTE — PROVIDER CONTACT NOTE (CHANGE IN STATUS NOTIFICATION) - ACTION/TREATMENT ORDERED:
As per NSCU team, pt does not require sliding scale coverage as per orders, hold 20U of NPH, team will write a 1 time order of 10 Units of NPH to be given for midnight, continue to monitor

## 2020-06-17 NOTE — PROGRESS NOTE ADULT - SUBJECTIVE AND OBJECTIVE BOX
HPI:  65 yo F with AMS since this morning.  Pt. was confused, didn't recognize son this morning.  He notes she was sleeping more than usual yesterday, which tipped off son that something was wrong.  At 11 am pt. was gasping for air like she was seizing. 2 hours ago pt. was dry heaving, her chest was going up and down and she started shaking like she was having a seizure.  Pt. has no history of seizures per son.  		PMH: hypothyroidism, HTN, IDDM, COPD/CHRIS on home O2, RA (on Prednisone), SBO s/p ex lap with lysis of adhesions c/b evisceration,  recent hospitalization in 3/2020 with encephalopathy, significantly hypothyroid (, without myxedema coma), small (stable) right sided SDH, UTI, catatonic reaction to VPA (now resolved) and DKA and psychiatric issues, 5/2020 hospitalization for GIB, Emphysematous cystitis, endometritis, Sigmoid colitis, morbid obesity, functional quadriplegia    HCP: Norris Rush, Son [phone]; Daughter, Heavenly Schmidt [phone]; son asks that only him or his sister, Heavenly be contacted for patient updates/information as they are both proxy's for patient (29 May 2020 23:41)    OVERNIGHT EVENTS: none      ICU Vital Signs Last 24 Hrs  T(C): 37.6 (17 Jun 2020 07:00), Max: 37.6 (17 Jun 2020 07:00)  T(F): 99.7 (17 Jun 2020 07:00), Max: 99.7 (17 Jun 2020 07:00)  HR: 101 (17 Jun 2020 07:00) (88 - 104)  BP: 116/63 (17 Jun 2020 07:00) (105/56 - 130/106)  BP(mean): 84 (17 Jun 2020 07:00) (74 - 115)  ABP: --  ABP(mean): --  RR: 17 (17 Jun 2020 07:00) (15 - 26)  SpO2: 100% (17 Jun 2020 07:00) (99% - 100%)    Mode: AC/ CMV (Assist Control/ Continuous Mandatory Ventilation), RR (machine): 14, TV (machine): 450, FiO2: 40, PEEP: 5, ITime: 1, MAP: 10, PIP: 23      MEDICATIONS  (STANDING):  amLODIPine   Tablet 10 milliGRAM(s) Oral daily  chlorhexidine 0.12% Liquid 15 milliLiter(s) Oral Mucosa every 12 hours  chlorhexidine 4% Liquid 1 Application(s) Topical <User Schedule>  dextrose 5%. 1000 milliLiter(s) (50 mL/Hr) IV Continuous <Continuous>  dextrose 50% Injectable 25 Gram(s) IV Push once  doxazosin 2 milliGRAM(s) Oral at bedtime  enoxaparin Injectable 30 milliGRAM(s) SubCutaneous two times a day  insulin lispro (HumaLOG) corrective regimen sliding scale   SubCutaneous every 6 hours  insulin NPH human recombinant 40 Unit(s) SubCutaneous <User Schedule>  insulin NPH human recombinant 20 Unit(s) SubCutaneous <User Schedule>  insulin NPH human recombinant 46 Unit(s) SubCutaneous <User Schedule>  lacosamide Solution 200 milliGRAM(s) Oral two times a day  levETIRAcetam  Solution 1000 milliGRAM(s) Oral two times a day  levothyroxine 150 MICROGram(s) Oral daily  metoprolol tartrate 25 milliGRAM(s) Oral every 8 hours  pantoprazole   Suspension 40 milliGRAM(s) Oral daily  predniSONE   Tablet 15 milliGRAM(s) Oral <User Schedule>  predniSONE   Tablet 5 milliGRAM(s) Oral at bedtime  psyllium Powder 1 Packet(s) Oral three times a day  simvastatin 20 milliGRAM(s) Oral at bedtime    MEDICATIONS  (PRN):  acetaminophen    Suspension .. 650 milliGRAM(s) Oral every 6 hours PRN Temp greater or equal to 38C (100.4F), Mild Pain (1 - 3)  fentaNYL    Injectable 25 MICROGram(s) IV Push every 4 hours PRN severe agitation/ discomfort  sodium chloride 0.9% lock flush 10 milliLiter(s) IV Push every 1 hour PRN Pre/post blood products, medications, blood draw, and to maintain line patency                                           8.5    8.56  )-----------( 446      ( 15 Nile 2020 22:25 )             27.6   06-15    146<H>  |  109<H>  |  20  ----------------------------<  100<H>  3.9   |  25  |  0.54    Ca    8.9      15 Nile 2020 22:25  Phos  3.6     06-15  Mg     2.1     06-15        Allergies    Depakote (Other)  Seroquel (Other (Severe))    CULTURES:  Culture Results:   >100,000 CFU/ml Escherichia coli ESBL (06-04 @ 01:09)  Culture Results:   No Growth Final (06-03 @ 15:00)    PHYSICAL EXAM:  Neurological:  intubated, eye open to noxious stimuli, disconjugated gaze, pupils 3mm reactive, +cough/gag, not follow commands, upper extremities tremors when stimulated, LE TF  Cardiovascular: +s1, s2  Respiratory: clear to auscultation b/l  Gastrointestinal: soft, non-distended, non-tender  abd: soft, non-tender, nl bs ,+ skin lesion  Extremities: Warm, dry  + skin tear on buttocks

## 2020-06-17 NOTE — PROVIDER CONTACT NOTE (CHANGE IN STATUS NOTIFICATION) - ASSESSMENT
Detail Level: Zone Pt is restless, fighting ventilator, & having tremors - Tylenol 650 mg PO as per MD orders given for pain, no relief observed

## 2020-06-17 NOTE — PROGRESS NOTE ADULT - SUBJECTIVE AND OBJECTIVE BOX
Diabetes Follow up note:    Chief complaint: T2DM    Interval Hx: BG values at goal. Received only NPH 10 units last night at midnight. Tolerating TF @ goal. No change in neuro status.     Review of Systems:  unable to assess ROS.     MEDS:  insulin lispro (HumaLOG) corrective regimen sliding scale   SubCutaneous every 6 hours  insulin NPH human recombinant 15 Unit(s) SubCutaneous <User Schedule>  insulin NPH human recombinant 35 Unit(s) SubCutaneous <User Schedule>  insulin NPH human recombinant 46 Unit(s) SubCutaneous <User Schedule>  levothyroxine 150 MICROGram(s) Oral daily  predniSONE   Tablet 15 milliGRAM(s) Oral <User Schedule>  predniSONE   Tablet 5 milliGRAM(s) Oral at bedtime  simvastatin 20 milliGRAM(s) Oral at bedtime      Allergies    Depakote (Other)  Seroquel (Other (Severe))        PE:  General: Female lying in bed. NAD.   Vital Signs Last 24 Hrs  T(C): 37 (17 Jun 2020 10:00), Max: 37.6 (17 Jun 2020 07:00)  T(F): 98.6 (17 Jun 2020 10:00), Max: 99.7 (17 Jun 2020 07:00)  HR: 99 (17 Jun 2020 12:00) (88 - 107)  BP: 104/66 (17 Jun 2020 12:00) (104/66 - 135/58)  BP(mean): 76 (17 Jun 2020 12:00) (74 - 115)  RR: 17 (17 Jun 2020 12:00) (15 - 26)  SpO2: 100% (17 Jun 2020 12:00) (99% - 100%)  HEENT: ETT in place. On vent.   Abd: Soft, NT,ND, Obese.   Extremities: Warm  Neuro: Unresponsive     LABS:  POCT Blood Glucose.: 129 mg/dL (06-17-20 @ 11:38)  POCT Blood Glucose.: 122 mg/dL (06-17-20 @ 07:46)  POCT Blood Glucose.: 154 mg/dL (06-17-20 @ 05:14)  POCT Blood Glucose.: 101 mg/dL (06-16-20 @ 23:51)  POCT Blood Glucose.: 137 mg/dL (06-16-20 @ 17:21)  POCT Blood Glucose.: 273 mg/dL (06-16-20 @ 11:10)  POCT Blood Glucose.: 152 mg/dL (06-16-20 @ 04:38)  POCT Blood Glucose.: 103 mg/dL (06-15-20 @ 22:45)  POCT Blood Glucose.: 88 mg/dL (06-15-20 @ 17:41)  POCT Blood Glucose.: 92 mg/dL (06-15-20 @ 11:22)  POCT Blood Glucose.: 161 mg/dL (06-15-20 @ 05:06)  POCT Blood Glucose.: 124 mg/dL (06-14-20 @ 23:08)                            8.5    8.56  )-----------( 446      ( 15 Nile 2020 22:25 )             27.6       06-15    146<H>  |  109<H>  |  20  ----------------------------<  100<H>  3.9   |  25  |  0.54    Ca    8.9      15 Nile 2020 22:25  Phos  3.6     06-15  Mg     2.1     06-15        Thyroid Function Tests:  06-11 @ 13:58 TSH 8.26 FreeT4 -- T3 55 Anti TPO -- Anti Thyroglobulin Ab -- TSI --  06-04 @ 01:58 TSH -- FreeT4 2.7 T3 -- Anti TPO -- Anti Thyroglobulin Ab -- TSI --      A1C with Estimated Average Glucose Result: 7.4 % (05-30-20 @ 04:43)  A1C with Estimated Average Glucose Result: 6.4 % (05-14-20 @ 16:23)  A1C with Estimated Average Glucose Result: 6.5 % (05-13-20 @ 08:24)  Hemoglobin A1C, Whole Blood: 9.0 % (01-28-20 @ 19:30)  Hemoglobin A1C, Whole Blood: 9.2 % (01-27-20 @ 17:36)          Contact number: bhavna [phone] or [phone]

## 2020-06-17 NOTE — PROGRESS NOTE ADULT - PROBLEM SELECTOR PLAN 2
-Levothyroxine 150mcg daily  -Hold TF 1 hour prior and after levothyroxine given.    discussed w/RN  pager: [phone]   cell: [phone]  office: [phone]    -I spent a total time of 25 mins with the patient at bedside/on unit of which more than 50% of time was spent on counseling/coordination of care.

## 2020-06-17 NOTE — PROGRESS NOTE ADULT - ASSESSMENT
ASSESSMENT:  Left frontal intracerebral hemorrhage, generalized periodic discharges, UTI.       NEURO:  Neuro q4h checks  seizure management: Keppra and Vimpat  Pain management with Tylenol & fentanyl prn  Given previous psych history and workup not revealing any cause, NMDA encephalitis testing sent -> NMDA AB neg.    Trach and PEG being discussed with family. family meeting  today.  Activity: [] OOB as tolerated [] Bedrest [X] PT [X] OT [] PMNR      PULM:  Intubated since 5/29- Day 18 of intubation. prvc 14/450/5/40  H/O of COPD   CPAP as tolerated. Has failed CPAP   Mild hypoxemia- likely atelectasis; will monitor        CV:  Keep -160  TTE negative for PFO  HTN: Continue norvasc   sinus tachycardia, metoprolol increased to 25 q8h overnight   Statin for HLD        RENAL:  IVL. Monitor Is/Os= even   Female iraheta  on cardura for urinary retention      GI:  Diet: On Glucerna OGT feeds  Rectal tube output for past 24 hours: 500ml. Continue Banana flakes and metamucil   GI prophylaxis [] not indicated [X] PO PPI [] other:        ENDO: S/P hyperglycemia - IR DM  Endo Following for adjustment of NPH; will follow up sugars tonight  Continue PO synthroid  Continue prednisone 15mg, 5 mg at bedtime (was on it at home for RA). Likely etiology of bruising  Goal euglycemia (-180)      HEME/ONC:  On SQL for DVT ppx- 30mg SQ q12.  VTE prophylaxis: [X] SCDs [X] chemoprophylaxis [X] high risk of DVT/PE on admission due to: bed bound at baseline  LE doppler 5/30- No DVT        ID:  Afebrile   Ertapnem for ESBL found growing in urine culture on 6/4 - ended on  - 6/14      MISC: Palliative care consulted. Previous multiple family meetings- yield no decision. Ethics following  Family meeting was scheduled for 6/12 with daughter Heavenly, son (Norris), BHAVIN, Ethics and palliative care. However, son Norris stated that he could not get all family members to be a part of meeting and requested for it to be postponed until  Tuesday, 6/16      SOCIAL/FAMILY:  [X] updated family via phone  [] family meeting    CODE STATUS:  [X] Full Code     DISPOSITION:  [X] ICU    [X] Patient is at high risk of neurologic deterioration/death due to: resp failure, seizures, hemorrhage.     Time spent:  35 critical care minutes ASSESSMENT:  Left frontal intracerebral hemorrhage, generalized periodic discharges, UTI.       NEURO:  Neuro q4h checks  seizure management: Keppra and Vimpat  Pain management with Tylenol & fentanyl prn  Given previous psych history and workup not revealing any cause, NMDA encephalitis testing sent -> NMDA AB neg.    Trach and PEG being discussed with family. no decision yet   Activity: [] OOB as tolerated [] Bedrest [X] PT [X] OT [] PMNR      PULM:  Intubated since 5/29- Day 18 of intubation. prvc 14/450/5/40  H/O of COPD   CPAP as tolerated. Has failed CPAP before   Mild hypoxemia- likely atelectasis; resolved         CV:  Keep -160  TTE negative for PFO  HTN: Continue norvasc   sinus tachycardia, metoprolol 25 q8h  Statin for HLD        RENAL:  IVL. Monitor Is/Os= even   Female iraheta  on cardura for urinary retention      GI:  Diet: On Glucerna OGT feeds  Continue Banana flakes and metamucil , LBM 6/17   GI prophylaxis [] not indicated [X] PO PPI [] other:        ENDO: S/P hyperglycemia - IR DM  Endo Following for adjustment of NPH; will follow up sugars tonight  Continue PO synthroid  Continue prednisone 15mg, 5 mg at bedtime (was on it at home for RA). Likely etiology of bruising  Goal euglycemia (-180)      HEME/ONC:  On SQL for DVT ppx- 30mg SQ q12.  VTE prophylaxis: [X] SCDs [X] chemoprophylaxis [X] high risk of DVT/PE on admission due to: bed bound at baseline  LE doppler 5/30- No DVT        ID:  Afebrile   Ertapnem for ESBL found growing in urine culture on 6/4 - ended on  - 6/14      MISC: Palliative care consulted. Previous multiple family meetings- yield no decision. Ethics following  Family meeting was scheduled for 6/12 with daughter Heavenly, son (Norris), BHAVIN, Ethics and palliative care. However, son Norris stated that he could not get all family members to be a part of meeting and requested for it to be postponed until    family meeting Tuesday, 6/16, family did not make decision yet       SOCIAL/FAMILY:  [X] updated family via phone  [] family meeting    CODE STATUS:  [X] Full Code     DISPOSITION:  [X] ICU    [X] Patient is at high risk of neurologic deterioration/death due to: resp failure, seizures, hemorrhage.

## 2020-06-17 NOTE — PROGRESS NOTE ADULT - ASSESSMENT
ASSESSMENT:  Left frontal intracerebral hemorrhage, generalized periodic discharges, UTI.       NEURO:  Neuro q4h checks  seizure management: Keppra and Vimpat  Pain management with Tylenol & fentanyl prn  Given previous psych history and workup not revealing any cause, NMDA encephalitis testing sent -> NMDA AB neg.    Trach and PEG being discussed with family. no decision yet   Activity: [] OOB as tolerated [] Bedrest [X] PT [X] OT [] PMNR      PULM:  Intubated since 5/29- Day 18 of intubation. prvc 14/450/5/40  H/O of COPD   CPAP as tolerated. Has failed CPAP before   Mild hypoxemia- likely atelectasis; resolved         CV:  Keep -160  TTE negative for PFO  HTN: Continue norvasc   sinus tachycardia, metoprolol 25 q8h  Statin for HLD        RENAL:  IVL. Monitor Is/Os= even   Female iraheta  on cardura for urinary retention      GI:  Diet: On Glucerna OGT feeds  Continue Banana flakes and metamucil , LBM 6/17   GI prophylaxis [] not indicated [X] PO PPI [] other:        ENDO: S/P hyperglycemia - IR DM  Endo Following for adjustment of NPH; will follow up sugars tonight  Continue PO synthroid  Continue prednisone 15mg, 5 mg at bedtime (was on it at home for RA). Likely etiology of bruising  Goal euglycemia (-180)      HEME/ONC:  On SQL for DVT ppx- 30mg SQ q12.  VTE prophylaxis: [X] SCDs [X] chemoprophylaxis [X] high risk of DVT/PE on admission due to: bed bound at baseline  LE doppler 5/30- No DVT        ID:  Afebrile   Ertapnem for ESBL found growing in urine culture on 6/4 - ended on  - 6/14      MISC: Palliative care consulted. Previous multiple family meetings- yield no decision. Ethics following  Family meeting was scheduled for 6/12 with daughter Heavenly, son (Norris), BHAVIN, Ethics and palliative care. However, son Norris stated that he could not get all family members to be a part of meeting and requested for it to be postponed until    family meeting Tuesday, 6/16, family did not make decision yet       SOCIAL/FAMILY:  [X] updated family via phone  [] family meeting    CODE STATUS:  [X] Full Code     DISPOSITION:  [X] ICU    [X] Patient is at high risk of neurologic deterioration/death due to: resp failure, seizures, hemorrhage. ASSESSMENT:  Left frontal intracerebral hemorrhage, generalized periodic discharges, UTI.       NEURO:  Neuro q4h checks  seizure management: Keppra and Vimpat  Pain management with Tylenol & fentanyl prn  Given previous psych history and workup not revealing any cause, NMDA encephalitis testing sent -> NMDA AB neg.    Trach and PEG being discussed with family. no decision yet   Activity: [] OOB as tolerated [] Bedrest [X] PT [X] OT [] PMNR      PULM:  Intubated since 5/29- Day 19 of intubation.  H/O of COPD   CPAP as tolerated. Has failed CPAP before   A lot of oral secretions- added scopolamine patch       CV:  Keep -160  TTE negative for PFO  HTN: Continue norvasc   sinus tachycardia, metoprolol 25 q8h  Statin for HLD        RENAL:  IVL. Monitor Is/Os  Female iraheta  on cardura for urinary retention      GI:  Diet: On Glucerna OGT feeds  Continue Banana flakes and metamucil , LBM 6/17   GI prophylaxis [] not indicated [X] PO PPI [] other:        ENDO: S/P hyperglycemia - IR DM  Endo Following for adjustment of NPH; will follow up on sugar levels  Continue PO synthroid  Continue prednisone 15mg, 5 mg at bedtime (was on it at home for RA). Likely etiology of bruising  Goal euglycemia (-180)      HEME/ONC:  On SQL for DVT ppx- 30mg SQ q12.  VTE prophylaxis: [X] SCDs [X] chemoprophylaxis [X] high risk of DVT/PE on admission due to: bed bound at baseline  LE doppler 5/30- No DVT        ID:  Afebrile   Ertapnem for ESBL found growing in urine culture on 6/4 - ended on  - 6/14      MISC: Palliative care consulted. Previous multiple family meetings- yield no decision. Ethics following  Family meeting was scheduled for 6/12 with daughter Heavenly, son (Norris), SW, Ethics and palliative care. However, son Norris stated that he could not get all family members to be a part of meeting and requested for it to be postponed until family meeting Tuesday, 6/16. However, family did not make decision during that meeting as well.  Ethics as tried to get in contact with family again for a follow up meeting but have not answered.       SOCIAL/FAMILY:  [X] updated family via phone  [] family meeting    CODE STATUS:  [X] Full Code     DISPOSITION:  [X] ICU    [X] Patient is at high risk of neurologic deterioration/death due to: resp failure, seizures, hemorrhage.     Critical care time: 30 minutes

## 2020-06-17 NOTE — PROGRESS NOTE ADULT - ASSESSMENT
63 yo F well known by endo team from prior admission. Pt w/h/o uncontrolled T2DM with no known complications. Also h/o hypothyroidism/ul HTN/ COPD and RA on chronic steroids. Here with mental status changes associated with shaking found to have a left frontal parenchymal hematoma with mass effect on the left lateral ventricle and intraventricular extension with blood layering in the occipital horns. Endocrine following for hyperglycemia on chronic steroids bid.  Tolerating TFs with BG values mostly at goal on present insulin regimen. BG goal (100-180mg/dl). Awaiting family GOC decisions.

## 2020-06-17 NOTE — PROGRESS NOTE ADULT - PROBLEM SELECTOR PLAN 1
-test BG Q6h  -Adjust NPH as follows.   -change NPH 15 units @ 6pm/12am  -change NPH 35 units @ 12pm  -c/w NPH 46 units @ 6am  -Hold NPH if TF off. Can reduce dose if BG less than 100mg/dl  -c/w Humalog moderate correction scale Q6h  Please notify endocrine if any changes in steroid regimen or TF regimen so we can adjust insulin regimen accordingly.

## 2020-06-17 NOTE — PROGRESS NOTE ADULT - SUBJECTIVE AND OBJECTIVE BOX
HPI:  63 yo F with AMS since this morning.  Pt. was confused, didn't recognize son this morning.  He notes she was sleeping more than usual yesterday, which tipped off son that something was wrong.  At 11 am pt. was gasping for air like she was seizing. 2 hours ago pt. was dry heaving, her chest was going up and down and she started shaking like she was having a seizure.  Pt. has no history of seizures per son.  		PMH: hypothyroidism, HTN, IDDM, COPD/CHRIS on home O2, RA (on Prednisone), SBO s/p ex lap with lysis of adhesions c/b evisceration,  recent hospitalization in 3/2020 with encephalopathy, significantly hypothyroid (, without myxedema coma), small (stable) right sided SDH, UTI, catatonic reaction to VPA (now resolved) and DKA and psychiatric issues, 5/2020 hospitalization for GIB, Emphysematous cystitis, endometritis, Sigmoid colitis, morbid obesity, functional quadriplegia    HCP: Norris Rush, Son [phone]; Daughter, Heavenly Schmidt [phone]; son asks that only him or his sister, Heavenly be contacted for patient updates/information as they are both proxy's for patient (29 May 2020 23:41)    OVERNIGHT EVENTS: none      ICU Vital Signs Last 24 Hrs  T(C): 37.6 (17 Jun 2020 07:00), Max: 37.6 (17 Jun 2020 07:00)  T(F): 99.7 (17 Jun 2020 07:00), Max: 99.7 (17 Jun 2020 07:00)  HR: 101 (17 Jun 2020 07:00) (88 - 104)  BP: 116/63 (17 Jun 2020 07:00) (105/56 - 130/106)  BP(mean): 84 (17 Jun 2020 07:00) (74 - 115)  ABP: --  ABP(mean): --  RR: 17 (17 Jun 2020 07:00) (15 - 26)  SpO2: 100% (17 Jun 2020 07:00) (99% - 100%)    Mode: AC/ CMV (Assist Control/ Continuous Mandatory Ventilation), RR (machine): 14, TV (machine): 450, FiO2: 40, PEEP: 5, ITime: 1, MAP: 10, PIP: 23      MEDICATIONS  (STANDING):  amLODIPine   Tablet 10 milliGRAM(s) Oral daily  chlorhexidine 0.12% Liquid 15 milliLiter(s) Oral Mucosa every 12 hours  chlorhexidine 4% Liquid 1 Application(s) Topical <User Schedule>  dextrose 5%. 1000 milliLiter(s) (50 mL/Hr) IV Continuous <Continuous>  dextrose 50% Injectable 25 Gram(s) IV Push once  doxazosin 2 milliGRAM(s) Oral at bedtime  enoxaparin Injectable 30 milliGRAM(s) SubCutaneous two times a day  insulin lispro (HumaLOG) corrective regimen sliding scale   SubCutaneous every 6 hours  insulin NPH human recombinant 40 Unit(s) SubCutaneous <User Schedule>  insulin NPH human recombinant 20 Unit(s) SubCutaneous <User Schedule>  insulin NPH human recombinant 46 Unit(s) SubCutaneous <User Schedule>  lacosamide Solution 200 milliGRAM(s) Oral two times a day  levETIRAcetam  Solution 1000 milliGRAM(s) Oral two times a day  levothyroxine 150 MICROGram(s) Oral daily  metoprolol tartrate 25 milliGRAM(s) Oral every 8 hours  pantoprazole   Suspension 40 milliGRAM(s) Oral daily  predniSONE   Tablet 15 milliGRAM(s) Oral <User Schedule>  predniSONE   Tablet 5 milliGRAM(s) Oral at bedtime  psyllium Powder 1 Packet(s) Oral three times a day  simvastatin 20 milliGRAM(s) Oral at bedtime    MEDICATIONS  (PRN):  acetaminophen    Suspension .. 650 milliGRAM(s) Oral every 6 hours PRN Temp greater or equal to 38C (100.4F), Mild Pain (1 - 3)  fentaNYL    Injectable 25 MICROGram(s) IV Push every 4 hours PRN severe agitation/ discomfort  sodium chloride 0.9% lock flush 10 milliLiter(s) IV Push every 1 hour PRN Pre/post blood products, medications, blood draw, and to maintain line patency                                           8.5    8.56  )-----------( 446      ( 15 Nile 2020 22:25 )             27.6   06-15    146<H>  |  109<H>  |  20  ----------------------------<  100<H>  3.9   |  25  |  0.54    Ca    8.9      15 Nile 2020 22:25  Phos  3.6     06-15  Mg     2.1     06-15        Allergies    Depakote (Other)  Seroquel (Other (Severe))    CULTURES:  Culture Results:   >100,000 CFU/ml Escherichia coli ESBL (06-04 @ 01:09)  Culture Results:   No Growth Final (06-03 @ 15:00)    PHYSICAL EXAM:  Neurological:  intubated, eye open to noxious stimuli, disconjugated gaze, pupils 3mm reactive, +cough/gag, not follow commands, upper extremities tremors when stimulated, LE TF  Cardiovascular: +s1, s2  Respiratory: clear to auscultation b/l  Gastrointestinal: soft, non-distended, non-tender  abd: soft, non-tender, nl bs ,+ skin lesion  Extremities: Warm, dry  + skin tear on buttocks HPI:  63 yo F with AMS since this morning.  Pt. was confused, didn't recognize son this morning.  He notes she was sleeping more than usual yesterday, which tipped off son that something was wrong.  At 11 am pt. was gasping for air like she was seizing. 2 hours ago pt. was dry heaving, her chest was going up and down and she started shaking like she was having a seizure.  Pt. has no history of seizures per son.  		PMH: hypothyroidism, HTN, IDDM, COPD/CHRIS on home O2, RA (on Prednisone), SBO s/p ex lap with lysis of adhesions c/b evisceration,  recent hospitalization in 3/2020 with encephalopathy, significantly hypothyroid (, without myxedema coma), small (stable) right sided SDH, UTI, catatonic reaction to VPA (now resolved) and DKA and psychiatric issues, 5/2020 hospitalization for GIB, Emphysematous cystitis, endometritis, Sigmoid colitis, morbid obesity, functional quadriplegia    HCP: Norris Rush, Son [phone]; Daughter, Heavenly Schmidt [phone]; son asks that only him or his sister, Heavenly be contacted for patient updates/information as they are both proxy's for patient (29 May 2020 23:41)    OVERNIGHT EVENTS: none    ICU Vital Signs Last 24 Hrs  T(C): 36.3 (17 Jun 2020 23:00), Max: 37.6 (17 Jun 2020 07:00)  T(F): 97.3 (17 Jun 2020 23:00), Max: 99.7 (17 Jun 2020 07:00)  HR: 107 (18 Jun 2020 02:00) (93 - 111)  BP: 103/64 (18 Jun 2020 02:00) (103/58 - 135/58)  BP(mean): 80 (18 Jun 2020 02:00) (76 - 97)  ABP: --  ABP(mean): --  RR: 22 (18 Jun 2020 02:00) (15 - 26)  SpO2: 100% (18 Jun 2020 02:00) (100% - 100%)    Mode: AC/ CMV (Assist Control/ Continuous Mandatory Ventilation)  RR (machine): 14  TV (machine): 450  FiO2: 40  PEEP: 5  ITime: 0.9  MAP: 10  PIP: 22    MEDICATIONS  (STANDING):  amLODIPine   Tablet 10 milliGRAM(s) Oral daily  chlorhexidine 0.12% Liquid 15 milliLiter(s) Oral Mucosa every 12 hours  chlorhexidine 4% Liquid 1 Application(s) Topical <User Schedule>  dextrose 5%. 1000 milliLiter(s) (50 mL/Hr) IV Continuous <Continuous>  dextrose 50% Injectable 25 Gram(s) IV Push once  doxazosin 2 milliGRAM(s) Oral at bedtime  enoxaparin Injectable 30 milliGRAM(s) SubCutaneous two times a day  insulin lispro (HumaLOG) corrective regimen sliding scale   SubCutaneous every 6 hours  insulin NPH human recombinant 15 Unit(s) SubCutaneous <User Schedule>  insulin NPH human recombinant 35 Unit(s) SubCutaneous <User Schedule>  insulin NPH human recombinant 46 Unit(s) SubCutaneous <User Schedule>  lacosamide Solution 200 milliGRAM(s) Oral two times a day  levETIRAcetam  Solution 1000 milliGRAM(s) Oral two times a day  levothyroxine 150 MICROGram(s) Oral daily  metoprolol tartrate 25 milliGRAM(s) Oral every 8 hours  pantoprazole   Suspension 40 milliGRAM(s) Oral daily  predniSONE   Tablet 15 milliGRAM(s) Oral <User Schedule>  predniSONE   Tablet 5 milliGRAM(s) Oral at bedtime  psyllium Powder 1 Packet(s) Oral three times a day  scopolamine 1 mG/72 Hr(s) Patch 1 Patch Transdermal every 72 hours  simvastatin 20 milliGRAM(s) Oral at bedtime    MEDICATIONS  (PRN):  acetaminophen    Suspension .. 650 milliGRAM(s) Oral every 6 hours PRN Temp greater or equal to 38C (100.4F), Mild Pain (1 - 3)  fentaNYL    Injectable 25 MICROGram(s) IV Push every 4 hours PRN severe agitation/ discomfort  sodium chloride 0.9% lock flush 10 milliLiter(s) IV Push every 1 hour PRN Pre/post blood products, medications, blood draw, and to maintain line patency                          8.6    8.45  )-----------( 424      ( 17 Jun 2020 22:39 )             28.8     Basic Metabolic Panel - STAT (06.17.20 @ 22:39)    Sodium, Serum: 145 mmol/L    Potassium, Serum: 4.0 mmol/L    Chloride, Serum: 108 mmol/L    Carbon Dioxide, Serum: 24 mmol/L    Anion Gap, Serum: 13 mmol/L    Blood Urea Nitrogen, Serum: 21 mg/dL    Creatinine, Serum: 0.56 mg/dL    Glucose, Serum: 105 mg/dL    Calcium, Total Serum: 9.0 mg/dL                  PHYSICAL EXAM:  Neurological:  intubated, eye open to noxious stimuli, disconjugated gaze, pupils 3mm reactive, +cough/gag, not follow commands, upper extremities tremors when stimulated, LE TF    Diet: Glucerna 1.2 OGT feeds

## 2020-06-17 NOTE — CHART NOTE - NSCHARTNOTEFT_GEN_A_CORE
Left message for HCP/daughter Heavenly.  Reached out to offer any support  needed  after conference call from 6/16.   Patient continues to demonstrate a poor neurological status , with no likelihood for meaningful recovery.   Team will continue to follow.

## 2020-06-18 LAB
GLUCOSE BLDC GLUCOMTR-MCNC: 136 MG/DL — HIGH (ref 70–99)
GLUCOSE BLDC GLUCOMTR-MCNC: 162 MG/DL — HIGH (ref 70–99)
GLUCOSE BLDC GLUCOMTR-MCNC: 193 MG/DL — HIGH (ref 70–99)
GLUCOSE BLDC GLUCOMTR-MCNC: 224 MG/DL — HIGH (ref 70–99)

## 2020-06-18 PROCEDURE — 99232 SBSQ HOSP IP/OBS MODERATE 35: CPT

## 2020-06-18 PROCEDURE — 99291 CRITICAL CARE FIRST HOUR: CPT

## 2020-06-18 RX ORDER — HUMAN INSULIN 100 [IU]/ML
14 INJECTION, SUSPENSION SUBCUTANEOUS
Refills: 0 | Status: DISCONTINUED | OUTPATIENT
Start: 2020-06-18 | End: 2020-06-22

## 2020-06-18 RX ORDER — HUMAN INSULIN 100 [IU]/ML
18 INJECTION, SUSPENSION SUBCUTANEOUS
Refills: 0 | Status: DISCONTINUED | OUTPATIENT
Start: 2020-06-18 | End: 2020-06-22

## 2020-06-18 RX ADMIN — LEVETIRACETAM 1000 MILLIGRAM(S): 250 TABLET, FILM COATED ORAL at 06:19

## 2020-06-18 RX ADMIN — ENOXAPARIN SODIUM 30 MILLIGRAM(S): 100 INJECTION SUBCUTANEOUS at 06:20

## 2020-06-18 RX ADMIN — Medication 1 PACKET(S): at 22:53

## 2020-06-18 RX ADMIN — Medication 15 MILLIGRAM(S): at 06:19

## 2020-06-18 RX ADMIN — Medication 25 MILLIGRAM(S): at 22:54

## 2020-06-18 RX ADMIN — CHLORHEXIDINE GLUCONATE 1 APPLICATION(S): 213 SOLUTION TOPICAL at 22:53

## 2020-06-18 RX ADMIN — HUMAN INSULIN 18 UNIT(S): 100 INJECTION, SUSPENSION SUBCUTANEOUS at 18:02

## 2020-06-18 RX ADMIN — HUMAN INSULIN 46 UNIT(S): 100 INJECTION, SUSPENSION SUBCUTANEOUS at 06:20

## 2020-06-18 RX ADMIN — HUMAN INSULIN 14 UNIT(S): 100 INJECTION, SUSPENSION SUBCUTANEOUS at 18:02

## 2020-06-18 RX ADMIN — LACOSAMIDE 200 MILLIGRAM(S): 50 TABLET ORAL at 06:18

## 2020-06-18 RX ADMIN — HUMAN INSULIN 18 UNIT(S): 100 INJECTION, SUSPENSION SUBCUTANEOUS at 23:18

## 2020-06-18 RX ADMIN — Medication 25 MILLIGRAM(S): at 13:38

## 2020-06-18 RX ADMIN — Medication 5 MILLIGRAM(S): at 22:59

## 2020-06-18 RX ADMIN — Medication 1 PACKET(S): at 13:38

## 2020-06-18 RX ADMIN — CHLORHEXIDINE GLUCONATE 15 MILLILITER(S): 213 SOLUTION TOPICAL at 06:20

## 2020-06-18 RX ADMIN — CHLORHEXIDINE GLUCONATE 15 MILLILITER(S): 213 SOLUTION TOPICAL at 18:05

## 2020-06-18 RX ADMIN — Medication 1 PACKET(S): at 06:19

## 2020-06-18 RX ADMIN — LEVETIRACETAM 1000 MILLIGRAM(S): 250 TABLET, FILM COATED ORAL at 18:05

## 2020-06-18 RX ADMIN — ENOXAPARIN SODIUM 30 MILLIGRAM(S): 100 INJECTION SUBCUTANEOUS at 18:04

## 2020-06-18 RX ADMIN — Medication 150 MICROGRAM(S): at 06:20

## 2020-06-18 RX ADMIN — Medication 25 MILLIGRAM(S): at 06:43

## 2020-06-18 RX ADMIN — PANTOPRAZOLE SODIUM 40 MILLIGRAM(S): 20 TABLET, DELAYED RELEASE ORAL at 11:06

## 2020-06-18 RX ADMIN — HUMAN INSULIN 35 UNIT(S): 100 INJECTION, SUSPENSION SUBCUTANEOUS at 11:05

## 2020-06-18 RX ADMIN — SCOPALAMINE 1 PATCH: 1 PATCH, EXTENDED RELEASE TRANSDERMAL at 18:22

## 2020-06-18 RX ADMIN — Medication 2 MILLIGRAM(S): at 22:53

## 2020-06-18 RX ADMIN — LACOSAMIDE 200 MILLIGRAM(S): 50 TABLET ORAL at 18:05

## 2020-06-18 RX ADMIN — SCOPALAMINE 1 PATCH: 1 PATCH, EXTENDED RELEASE TRANSDERMAL at 00:37

## 2020-06-18 RX ADMIN — SCOPALAMINE 1 PATCH: 1 PATCH, EXTENDED RELEASE TRANSDERMAL at 07:56

## 2020-06-18 RX ADMIN — Medication 2: at 11:05

## 2020-06-18 RX ADMIN — SIMVASTATIN 20 MILLIGRAM(S): 20 TABLET, FILM COATED ORAL at 22:54

## 2020-06-18 RX ADMIN — Medication 2: at 18:01

## 2020-06-18 RX ADMIN — AMLODIPINE BESYLATE 10 MILLIGRAM(S): 2.5 TABLET ORAL at 06:20

## 2020-06-18 RX ADMIN — Medication 4: at 06:20

## 2020-06-18 NOTE — PROGRESS NOTE ADULT - SUBJECTIVE AND OBJECTIVE BOX
HPI:  63 yo F with AMS since this morning.  Pt. was confused, didn't recognize son this morning.  He notes she was sleeping more than usual yesterday, which tipped off son that something was wrong.  At 11 am pt. was gasping for air like she was seizing. 2 hours ago pt. was dry heaving, her chest was going up and down and she started shaking like she was having a seizure.  Pt. has no history of seizures per son.  		PMH: hypothyroidism, HTN, IDDM, COPD/CHRIS on home O2, RA (on Prednisone), SBO s/p ex lap with lysis of adhesions c/b evisceration,  recent hospitalization in 3/2020 with encephalopathy, significantly hypothyroid (, without myxedema coma), small (stable) right sided SDH, UTI, catatonic reaction to VPA (now resolved) and DKA and psychiatric issues, 5/2020 hospitalization for GIB, Emphysematous cystitis, endometritis, Sigmoid colitis, morbid obesity, functional quadriplegia    HCP: Norris Rush, Son [phone]; Daughter, Heavenly Schmidt [phone]; son asks that only him or his sister, Heavenly be contacted for patient updates/information as they are both proxy's for patient (29 May 2020 23:41)    OVERNIGHT EVENTS: none        ICU Vital Signs Last 24 Hrs  T(C): 36.1 (18 Jun 2020 03:00), Max: 37.2 (17 Jun 2020 14:00)  T(F): 97 (18 Jun 2020 03:00), Max: 99 (17 Jun 2020 14:00)  HR: 94 (18 Jun 2020 07:00) (94 - 114)  BP: 113/55 (18 Jun 2020 07:00) (103/58 - 149/79)  BP(mean): 78 (18 Jun 2020 07:00) (76 - 104)  RR: 17 (18 Jun 2020 07:00) (15 - 26)  SpO2: 100% (18 Jun 2020 07:00) (100% - 100%)      06-17-20 @ 07:01  -  06-18-20 @ 07:00  --------------------------------------------------------  IN: 2390 mL / OUT: 1280 mL / NET: 1110 mL        Mode: AC/ CMV (Assist Control/ Continuous Mandatory Ventilation), RR (machine): 14, TV (machine): 450, FiO2: 40, PEEP: 5, ITime: 0.9, MAP: 8, PIP: 22    acetaminophen    Suspension .. 650 milliGRAM(s) Oral every 6 hours PRN  amLODIPine   Tablet 10 milliGRAM(s) Oral daily  chlorhexidine 0.12% Liquid 15 milliLiter(s) Oral Mucosa every 12 hours  chlorhexidine 4% Liquid 1 Application(s) Topical <User Schedule>  dextrose 5%. 1000 milliLiter(s) (50 mL/Hr) IV Continuous <Continuous>  dextrose 50% Injectable 25 Gram(s) IV Push once  doxazosin 2 milliGRAM(s) Oral at bedtime  enoxaparin Injectable 30 milliGRAM(s) SubCutaneous two times a day  fentaNYL    Injectable 25 MICROGram(s) IV Push every 4 hours PRN  insulin lispro (HumaLOG) corrective regimen sliding scale   SubCutaneous every 6 hours  insulin NPH human recombinant 15 Unit(s) SubCutaneous <User Schedule>  insulin NPH human recombinant 35 Unit(s) SubCutaneous <User Schedule>  insulin NPH human recombinant 46 Unit(s) SubCutaneous <User Schedule>  lacosamide Solution 200 milliGRAM(s) Oral two times a day  levETIRAcetam  Solution 1000 milliGRAM(s) Oral two times a day  levothyroxine 150 MICROGram(s) Oral daily  metoprolol tartrate 25 milliGRAM(s) Oral every 8 hours  pantoprazole   Suspension 40 milliGRAM(s) Oral daily  predniSONE   Tablet 15 milliGRAM(s) Oral <User Schedule>  predniSONE   Tablet 5 milliGRAM(s) Oral at bedtime  psyllium Powder 1 Packet(s) Oral three times a day  scopolamine 1 mG/72 Hr(s) Patch 1 Patch Transdermal every 72 hours  simvastatin 20 milliGRAM(s) Oral at bedtime  sodium chloride 0.9% lock flush 10 milliLiter(s) IV Push every 1 hour PRN      LABS:  Na: 145 (06-17 @ 22:39), 146 (06-15 @ 22:25)  K: 4.0 (06-17 @ 22:39), 3.9 (06-15 @ 22:25)  Cl: 108 (06-17 @ 22:39), 109 (06-15 @ 22:25)  CO2: 24 (06-17 @ 22:39), 25 (06-15 @ 22:25)  BUN: 21 (06-17 @ 22:39), 20 (06-15 @ 22:25)  Cr: 0.56 (06-17 @ 22:39), 0.54 (06-15 @ 22:25)  Glu: 105(06-17 @ 22:39), 100(06-15 @ 22:25)    Hgb: 8.6 (06-17 @ 22:39), 8.5 (06-15 @ 22:25)  Hct: 28.8 (06-17 @ 22:39), 27.6 (06-15 @ 22:25)  WBC: 8.45 (06-17 @ 22:39), 8.56 (06-15 @ 22:25)  Plt: 424 (06-17 @ 22:39), 446 (06-15 @ 22:25)                PHYSICAL EXAM:  Neurological:  intubated, eye open to noxious stimuli, disconjugated gaze, pupils 3mm reactive, +cough/gag, not follow commands, upper extremities tremors when stimulated, LE TF  Cardiovascular: +s1, s2  Respiratory: clear to auscultation b/l  Gastrointestinal: soft, non-distended, non-tender  abd: soft, non-tender, nl bs ,+ skin lesion  Extremities: Warm, dry  + skin tear on buttocks HPI:  63 yo F with AMS since this morning.  Pt. was confused, didn't recognize son this morning.  He notes she was sleeping more than usual yesterday, which tipped off son that something was wrong.  At 11 am pt. was gasping for air like she was seizing. 2 hours ago pt. was dry heaving, her chest was going up and down and she started shaking like she was having a seizure.  Pt. has no history of seizures per son.  		PMH: hypothyroidism, HTN, IDDM, COPD/CHRIS on home O2, RA (on Prednisone), SBO s/p ex lap with lysis of adhesions c/b evisceration,  recent hospitalization in 3/2020 with encephalopathy, significantly hypothyroid (, without myxedema coma), small (stable) right sided SDH, UTI, catatonic reaction to VPA (now resolved) and DKA and psychiatric issues, 5/2020 hospitalization for GIB, Emphysematous cystitis, endometritis, Sigmoid colitis, morbid obesity, functional quadriplegia    HCP: Norris Rush, Son [phone]; Daughter, Heavenly Schmidt [phone]; son asks that only him or his sister, Heavenly be contacted for patient updates/information as they are both proxy's for patient (29 May 2020 23:41)    OVERNIGHT EVENTS: none        ICU Vital Signs Last 24 Hrs  T(C): 36.1 (18 Jun 2020 03:00), Max: 37.2 (17 Jun 2020 14:00)  T(F): 97 (18 Jun 2020 03:00), Max: 99 (17 Jun 2020 14:00)  HR: 94 (18 Jun 2020 07:00) (94 - 114)  BP: 113/55 (18 Jun 2020 07:00) (103/58 - 149/79)  BP(mean): 78 (18 Jun 2020 07:00) (76 - 104)  RR: 17 (18 Jun 2020 07:00) (15 - 26)  SpO2: 100% (18 Jun 2020 07:00) (100% - 100%)      06-17-20 @ 07:01  -  06-18-20 @ 07:00  --------------------------------------------------------  IN: 2390 mL / OUT: 1280 mL / NET: 1110 mL        Mode: AC/ CMV (Assist Control/ Continuous Mandatory Ventilation), RR (machine): 14, TV (machine): 450, FiO2: 40, PEEP: 5, ITime: 0.9, MAP: 8, PIP: 22    acetaminophen    Suspension .. 650 milliGRAM(s) Oral every 6 hours PRN  amLODIPine   Tablet 10 milliGRAM(s) Oral daily  chlorhexidine 0.12% Liquid 15 milliLiter(s) Oral Mucosa every 12 hours  chlorhexidine 4% Liquid 1 Application(s) Topical <User Schedule>  dextrose 5%. 1000 milliLiter(s) (50 mL/Hr) IV Continuous <Continuous>  dextrose 50% Injectable 25 Gram(s) IV Push once  doxazosin 2 milliGRAM(s) Oral at bedtime  enoxaparin Injectable 30 milliGRAM(s) SubCutaneous two times a day  fentaNYL    Injectable 25 MICROGram(s) IV Push every 4 hours PRN  insulin lispro (HumaLOG) corrective regimen sliding scale   SubCutaneous every 6 hours  insulin NPH human recombinant 15 Unit(s) SubCutaneous <User Schedule>  insulin NPH human recombinant 35 Unit(s) SubCutaneous <User Schedule>  insulin NPH human recombinant 46 Unit(s) SubCutaneous <User Schedule>  lacosamide Solution 200 milliGRAM(s) Oral two times a day  levETIRAcetam  Solution 1000 milliGRAM(s) Oral two times a day  levothyroxine 150 MICROGram(s) Oral daily  metoprolol tartrate 25 milliGRAM(s) Oral every 8 hours  pantoprazole   Suspension 40 milliGRAM(s) Oral daily  predniSONE   Tablet 15 milliGRAM(s) Oral <User Schedule>  predniSONE   Tablet 5 milliGRAM(s) Oral at bedtime  psyllium Powder 1 Packet(s) Oral three times a day  scopolamine 1 mG/72 Hr(s) Patch 1 Patch Transdermal every 72 hours  simvastatin 20 milliGRAM(s) Oral at bedtime  sodium chloride 0.9% lock flush 10 milliLiter(s) IV Push every 1 hour PRN      LABS:  Na: 145 (06-17 @ 22:39), 146 (06-15 @ 22:25)  K: 4.0 (06-17 @ 22:39), 3.9 (06-15 @ 22:25)  Cl: 108 (06-17 @ 22:39), 109 (06-15 @ 22:25)  CO2: 24 (06-17 @ 22:39), 25 (06-15 @ 22:25)  BUN: 21 (06-17 @ 22:39), 20 (06-15 @ 22:25)  Cr: 0.56 (06-17 @ 22:39), 0.54 (06-15 @ 22:25)  Glu: 105(06-17 @ 22:39), 100(06-15 @ 22:25)    Hgb: 8.6 (06-17 @ 22:39), 8.5 (06-15 @ 22:25)  Hct: 28.8 (06-17 @ 22:39), 27.6 (06-15 @ 22:25)  WBC: 8.45 (06-17 @ 22:39), 8.56 (06-15 @ 22:25)  Plt: 424 (06-17 @ 22:39), 446 (06-15 @ 22:25)                PHYSICAL EXAM:  Neurological:  intubated, EO spont, does not track, disconjugated gaze, pupils 3mm reactive, +cough/gag, not follow commands, upper extremities tremors when stimulated, LE TF  Cardiovascular: +s1, s2  Respiratory: clear to auscultation b/l  Gastrointestinal: soft, non-distended, non-tender  abd: soft, non-tender, nl bs ,+ skin lesion  Extremities: Warm, dry  + skin tear on buttocks

## 2020-06-18 NOTE — CHART NOTE - NSCHARTNOTEFT_GEN_A_CORE
Ethicist JASIEL Quiroga spoke with patient's daughter (Surrogate Decision-Maker) Heavenly Hernández on the phone for > 30 minutes.  Recapped the discussion on Tuesday.  Mrs. Hernández explained to ethicist the family dynamics.  Expressed feeling stressed having to "be strong" for all of her family members and unable to grieve herself.  Provided emotional support.  Discussed the challenges of having an extended family, but expressed difficulty being the sole decision-maker while there are so many other family members who have not been able to see her.  Mrs. Hernández explained some frustration that the pandemic has limited the ability to visit her mother.  Mrs. Hernández described the previous hospitalizations that resulted in the patient being able to "pull through".  Mrs. Hernández confirms understanding of the medical conditions and acknowledges that either decision is undesirable.  Ethicist explained the risks of prolonged hospitalization and the risks of leaving the endotracheal tube in place.  Mrs. Hernández confirmed understanding.  Discussed the challenges of being the decision-maker for a loved one.  Mrs. Hernández relayed concerns of the family for not being able to see the patient but expressed how grateful she has been to the medical team taking care of her mother and of all healthcare workers on the front lines during this pandemic.  Ethicist thanked Mrs. Hernández.  Explained to Mrs. Hernández that the decision is not her own, but rather her mother's.  Explained that Mrs. Hernández is just voicing her mother's already expressed wishes.  Mrs. Hernández expressed gratefulness for the comment.  Close to the end of the phone conversation, ethicist "gave permission" for Mrs. Hernández to experience a "good cry".  Mrs. Hernández laughed and began to say goodbye but became inaudible.  After ~10 seconds of silence, Mrs. Hernández became audible and was crying.  ~45 seconds was spent in silence as Mrs. Hernández eventually said goodbye to ethicist.  Contact information left with Mrs. Hernández.    Mega Quiroga, MPH, [facility]  Medical Ethicist  [phone]

## 2020-06-18 NOTE — PROGRESS NOTE ADULT - SUBJECTIVE AND OBJECTIVE BOX
Diabetes Follow up note:    Chief complaint: T2DM    Interval Hx: Pt remains intubated on continuous tube feeds. Tracking with eyes but unresponsive to verbal commands. TF running @ goal w/BG values 100s-low 200s over past 24 hours.     Review of Systems:  unable to provide ROS.     MEDS:  insulin lispro (HumaLOG) corrective regimen sliding scale   SubCutaneous every 6 hours  insulin NPH human recombinant 15 Unit(s) SubCutaneous <User Schedule>  insulin NPH human recombinant 35 Unit(s) SubCutaneous <User Schedule>  insulin NPH human recombinant 46 Unit(s) SubCutaneous <User Schedule>  levothyroxine 150 MICROGram(s) Oral daily  predniSONE   Tablet 15 milliGRAM(s) Oral <User Schedule>  predniSONE   Tablet 5 milliGRAM(s) Oral at bedtime  simvastatin 20 milliGRAM(s) Oral at bedtime      Allergies    Depakote (Other)  Seroquel (Other (Severe))      PE:  General: Female lying in bed. Intubated.   Vital Signs Last 24 Hrs  T(C): 36.5 (18 Jun 2020 11:00), Max: 37.2 (17 Jun 2020 14:00)  T(F): 97.7 (18 Jun 2020 11:00), Max: 99 (17 Jun 2020 14:00)  HR: 97 (18 Jun 2020 11:58) (91 - 114)  BP: 117/68 (18 Jun 2020 11:00) (103/58 - 149/79)  BP(mean): 86 (18 Jun 2020 11:00) (77 - 104)  RR: 18 (18 Jun 2020 11:00) (14 - 26)  SpO2: 100% (18 Jun 2020 11:58) (100% - 100%)  HEENT: ETT/OGT in place. On vent  Abd: Soft, NT,ND, Obese.   Extremities: Warm  Neuro: Opens eyes/tracks but not following to commands.     LABS:  POCT Blood Glucose.: 193 mg/dL (06-18-20 @ 10:54)  POCT Blood Glucose.: 224 mg/dL (06-18-20 @ 06:16)  POCT Blood Glucose.: 106 mg/dL (06-17-20 @ 23:46)  POCT Blood Glucose.: 118 mg/dL (06-17-20 @ 17:04)  POCT Blood Glucose.: 129 mg/dL (06-17-20 @ 11:38)  POCT Blood Glucose.: 122 mg/dL (06-17-20 @ 07:46)  POCT Blood Glucose.: 154 mg/dL (06-17-20 @ 05:14)  POCT Blood Glucose.: 101 mg/dL (06-16-20 @ 23:51)  POCT Blood Glucose.: 137 mg/dL (06-16-20 @ 17:21)  POCT Blood Glucose.: 273 mg/dL (06-16-20 @ 11:10)  POCT Blood Glucose.: 152 mg/dL (06-16-20 @ 04:38)  POCT Blood Glucose.: 103 mg/dL (06-15-20 @ 22:45)  POCT Blood Glucose.: 88 mg/dL (06-15-20 @ 17:41)                            8.6    8.45  )-----------( 424      ( 17 Jun 2020 22:39 )             28.8       06-17    145  |  108  |  21  ----------------------------<  105<H>  4.0   |  24  |  0.56    Ca    9.0      17 Jun 2020 22:39  Phos  3.5     06-17  Mg     2.2     06-17        Thyroid Function Tests:  06-11 @ 13:58 TSH 8.26 FreeT4 -- T3 55 Anti TPO -- Anti Thyroglobulin Ab -- TSI --  06-04 @ 01:58 TSH -- FreeT4 2.7 T3 -- Anti TPO -- Anti Thyroglobulin Ab -- TSI --      A1C with Estimated Average Glucose Result: 7.4 % (05-30-20 @ 04:43)  A1C with Estimated Average Glucose Result: 6.4 % (05-14-20 @ 16:23)  A1C with Estimated Average Glucose Result: 6.5 % (05-13-20 @ 08:24)  Hemoglobin A1C, Whole Blood: 9.0 % (01-28-20 @ 19:30)  Hemoglobin A1C, Whole Blood: 9.2 % (01-27-20 @ 17:36)          Contact number: bhavna [phone] or [phone]

## 2020-06-18 NOTE — CHART NOTE - NSCHARTNOTEFT_GEN_A_CORE
Nutrition Follow Up Note     Patient seen for: nutrition follow up on SICU (NSICU)    Source: RN, medical record, communication with team.     Chart reviewed, events noted. Left frontal intracerebral hemorrhage, generalized periodic discharges, UTI.     Diet Order: Diet, NPO with Tube Feed:   Tube Feeding Modality: Orogastric  Glucerna 1.2 Dewey (GLUCERNARTH)  Total Volume for 24 Hours (mL): 1560  Continuous  Starting Tube Feed Rate {mL per Hour}: 20  Increase Tube Feed Rate by (mL): 10     Every 4 hours  Until Goal Tube Feed Rate (mL per Hour): 65  Tube Feed Duration (in Hours): 24  Tube Feed Start Time: 23:00 (06-02-20 @ 11:06)    CURRENT EN ORDER PROVIDES:   - 1560 ml formula, 1872 calories (31 dewey/kg), 94 grams protein (1.6 gm/kg), based on upper IBW 60 kg  - meets nutrition needs    Nutrition Events:   - Enteral Nutrition: Pt has been receiving 96% of EN goal x 1 week. OGT clogged with Metamucil yesterday, now replaced.  - EN is being held 1 hour before/after synthroid dose. Consider changing to IV synthroid.   - Hyperglycemia addressed with NPH 15 units (00:00, 18:00), 35 units (12:00), 46 units (6:00) an Humalog corrective regimen.  - Metamucil ordered via OGT tid; Provider to RN for Banatrol bid    Last BM 6/16, 6/17. Bowel regimen: Metamucil, Banatrol    Anthropometric Measurements:   Height (cm): 162.6 (05-30-20 @ 00:49), 152.4 (05-29-20 @ 19:18), 154.9 (05-12-20 @ 21:48)  Weight (kg): 99.6 (05-30-20 @ 00:49), 136.1 (05-29-20 @ 19:18), 98.2 (05-12-20 @ 23:57)  BMI (kg/m2): 37.7 (05-30-20 @ 00:49), 51.5 (05-30-20 @ 00:49), 58.6 (05-29-20 @ 19:18), 40.9 (05-12-20 @ 23:57)  IBW: 54.5 kg +/- 10%; upper IBW 60 kg    Medications: MEDICATIONS  (STANDING):  amLODIPine   Tablet 10 milliGRAM(s) Oral daily  chlorhexidine 0.12% Liquid 15 milliLiter(s) Oral Mucosa every 12 hours  chlorhexidine 4% Liquid 1 Application(s) Topical <User Schedule>  dextrose 5%. 1000 milliLiter(s) (50 mL/Hr) IV Continuous <Continuous>  dextrose 50% Injectable 25 Gram(s) IV Push once  doxazosin 2 milliGRAM(s) Oral at bedtime  enoxaparin Injectable 30 milliGRAM(s) SubCutaneous two times a day  insulin lispro (HumaLOG) corrective regimen sliding scale   SubCutaneous every 6 hours  insulin NPH human recombinant 15 Unit(s) SubCutaneous <User Schedule>  insulin NPH human recombinant 35 Unit(s) SubCutaneous <User Schedule>  insulin NPH human recombinant 46 Unit(s) SubCutaneous <User Schedule>  lacosamide Solution 200 milliGRAM(s) Oral two times a day  levETIRAcetam  Solution 1000 milliGRAM(s) Oral two times a day  levothyroxine 150 MICROGram(s) Oral daily  metoprolol tartrate 25 milliGRAM(s) Oral every 8 hours  pantoprazole   Suspension 40 milliGRAM(s) Oral daily  predniSONE   Tablet 15 milliGRAM(s) Oral <User Schedule>  predniSONE   Tablet 5 milliGRAM(s) Oral at bedtime  psyllium Powder 1 Packet(s) Oral three times a day  scopolamine 1 mG/72 Hr(s) Patch 1 Patch Transdermal every 72 hours  simvastatin 20 milliGRAM(s) Oral at bedtime    MEDICATIONS  (PRN):  acetaminophen    Suspension .. 650 milliGRAM(s) Oral every 6 hours PRN Temp greater or equal to 38C (100.4F), Mild Pain (1 - 3)  fentaNYL    Injectable 25 MICROGram(s) IV Push every 4 hours PRN severe agitation/ discomfort  sodium chloride 0.9% lock flush 10 milliLiter(s) IV Push every 1 hour PRN Pre/post blood products, medications, blood draw, and to maintain line patency    Labs: 06-17 @ 22:39: Sodium 145, Potassium 4.0, Calcium 9.0, Magnesium 2.2, Phosphorus 3.5, BUN 21, Creatinine 0.56, Glucose 105<H>,  Hemoglobin 8.6<L>, Hematocrit 28.8<L>, Creatine Kinase <<27>    Triglycerides, Serum: 326 mg/dL (05-30-20 @ 04:52)    POCT Blood Glucose.: 224 mg/dL (06-18-20 @ 06:16)  POCT Blood Glucose.: 106 mg/dL (06-17-20 @ 23:46)  POCT Blood Glucose.: 118 mg/dL (06-17-20 @ 17:04)  POCT Blood Glucose.: 129 mg/dL (06-17-20 @ 11:38)    Skin per nursing documentation: no pressure injuries documented  Edema: 3+ generalized    Estimated Needs: based on upper IBW 60 kg, with consideration for intubation and BMI>30  Energy: (30-35 dewey/kg)  Protein:  (1.4-1.6 mg/kg)  Yang State Equation (REE):     Previous Nutrition Diagnosis: Increased Nutrient Needs  Nutrition Diagnosis is: ongoing, addressed with EN at goal    New Nutrition Diagnosis:  none    Recommended Interventions:   1. Continue Glucerna1.2 @ 65 ml/hr x 24 hours to provide 1560 ml formula, 1872 calories (31 dewey/kg), 94 grams protein (1.6 gm/kg), based on upper IBW 60 kg  2. Consider changing po synthroid to IV; EN currently held 1 hour before/after synthroid dose  3. Monitor BMs and ongoing need for Metamucil and Banatrol      Monitoring and Evaluation:   Continue to monitor nutrition provision and tolerance, weights, labs, skin integrity.   RD remains available upon request and will follow up per protocol.    Carolyn Contreras, MS RD CDN [facility]; Pager # [phone] Nutrition Follow Up Note     Patient seen for: nutrition follow up on SICU (NSICU)    Source: RN, medical record, communication with team.     Chart reviewed, events noted. Left frontal intracerebral hemorrhage, generalized periodic discharges, UTI.     Diet Order: Diet, NPO with Tube Feed:   Tube Feeding Modality: Orogastric  Glucerna 1.2 Dewey (GLUCERNARTH)  Total Volume for 24 Hours (mL): 1560  Continuous  Starting Tube Feed Rate {mL per Hour}: 20  Increase Tube Feed Rate by (mL): 10     Every 4 hours  Until Goal Tube Feed Rate (mL per Hour): 65  Tube Feed Duration (in Hours): 24  Tube Feed Start Time: 23:00 (06-02-20 @ 11:06)    CURRENT EN ORDER PROVIDES:   - 1560 ml formula, 1872 calories (31 dewey/kg), 94 grams protein (1.6 gm/kg), based on upper IBW 60 kg  - meets nutrition needs    Nutrition Events:   - Enteral Nutrition: Pt has been receiving 96% of EN goal x 1 week. OGT clogged with Metamucil yesterday, now replaced.  - EN is being held 1 hour before/after synthroid dose. Consider changing to IV synthroid.   - Hyperglycemia addressed with NPH 15 units (00:00, 18:00), 35 units (12:00), 46 units (6:00) and Humalog corrective regimen.  - Metamucil ordered via OGT tid; Provider to RN for Banatrol bid    Last BM 6/16, 6/17. Bowel regimen: Metamucil, Banatrol    Anthropometric Measurements:   Height (cm): 162.6 (05-30-20 @ 00:49), 152.4 (05-29-20 @ 19:18), 154.9 (05-12-20 @ 21:48)  Weight (kg): 99.6 (05-30-20 @ 00:49), 136.1 (05-29-20 @ 19:18), 98.2 (05-12-20 @ 23:57)  Daily wt (kg): 96.2 (6-18-20)  BMI (kg/m2): 37.7 (05-30-20 @ 00:49), 51.5 (05-30-20 @ 00:49), 58.6 (05-29-20 @ 19:18), 40.9 (05-12-20 @ 23:57)  IBW: 54.5 kg +/- 10%; upper IBW 60 kg    Medications: MEDICATIONS  (STANDING):  amLODIPine   Tablet 10 milliGRAM(s) Oral daily  chlorhexidine 0.12% Liquid 15 milliLiter(s) Oral Mucosa every 12 hours  chlorhexidine 4% Liquid 1 Application(s) Topical <User Schedule>  dextrose 5%. 1000 milliLiter(s) (50 mL/Hr) IV Continuous <Continuous>  dextrose 50% Injectable 25 Gram(s) IV Push once  doxazosin 2 milliGRAM(s) Oral at bedtime  enoxaparin Injectable 30 milliGRAM(s) SubCutaneous two times a day  insulin lispro (HumaLOG) corrective regimen sliding scale   SubCutaneous every 6 hours  insulin NPH human recombinant 15 Unit(s) SubCutaneous <User Schedule>  insulin NPH human recombinant 35 Unit(s) SubCutaneous <User Schedule>  insulin NPH human recombinant 46 Unit(s) SubCutaneous <User Schedule>  lacosamide Solution 200 milliGRAM(s) Oral two times a day  levETIRAcetam  Solution 1000 milliGRAM(s) Oral two times a day  levothyroxine 150 MICROGram(s) Oral daily  metoprolol tartrate 25 milliGRAM(s) Oral every 8 hours  pantoprazole   Suspension 40 milliGRAM(s) Oral daily  predniSONE   Tablet 15 milliGRAM(s) Oral <User Schedule>  predniSONE   Tablet 5 milliGRAM(s) Oral at bedtime  psyllium Powder 1 Packet(s) Oral three times a day  scopolamine 1 mG/72 Hr(s) Patch 1 Patch Transdermal every 72 hours  simvastatin 20 milliGRAM(s) Oral at bedtime    MEDICATIONS  (PRN):  acetaminophen    Suspension .. 650 milliGRAM(s) Oral every 6 hours PRN Temp greater or equal to 38C (100.4F), Mild Pain (1 - 3)  fentaNYL    Injectable 25 MICROGram(s) IV Push every 4 hours PRN severe agitation/ discomfort  sodium chloride 0.9% lock flush 10 milliLiter(s) IV Push every 1 hour PRN Pre/post blood products, medications, blood draw, and to maintain line patency    Labs: 06-17 @ 22:39: Sodium 145, Potassium 4.0, Calcium 9.0, Magnesium 2.2, Phosphorus 3.5, BUN 21, Creatinine 0.56, Glucose 105<H>,  Hemoglobin 8.6<L>, Hematocrit 28.8<L>, Creatine Kinase <<27>    Triglycerides, Serum: 326 mg/dL (05-30-20 @ 04:52)    POCT Blood Glucose.: 224 mg/dL (06-18-20 @ 06:16)  POCT Blood Glucose.: 106 mg/dL (06-17-20 @ 23:46)  POCT Blood Glucose.: 118 mg/dL (06-17-20 @ 17:04)  POCT Blood Glucose.: 129 mg/dL (06-17-20 @ 11:38)    Skin per nursing documentation: no pressure injuries documented  Edema: 3+ generalized    Estimated Needs: based on upper IBW 60 kg, with consideration for intubation and BMI>30  Energy: 8103-6295 calories (30-35 dewey/kg)  Protein: 84-96 grams (1.4-1.6 gm/kg)  Yang State Equation (REE): 1721 calories    Previous Nutrition Diagnosis: Increased Nutrient Needs  Nutrition Diagnosis is: ongoing, addressed with EN at goal    New Nutrition Diagnosis:  none    Recommended Interventions:   1. Continue Glucerna1.2 @ 65 ml/hr x 24 hours to provide 1560 ml formula, 1872 calories (31 dewey/kg), 94 grams protein (1.6 gm/kg), based on upper IBW 60 kg  2. Consider changing po synthroid to IV; EN currently held 1 hour before/after oral synthroid dose  3. Monitor BMs and ongoing need for Metamucil and Banatrol      Monitoring and Evaluation:   Continue to monitor nutrition provision and tolerance, weights, labs, skin integrity.   RD remains available upon request and will follow up per protocol.    Carolyn Contreras, MS RD CDN [facility]; Pager # [phone]

## 2020-06-18 NOTE — PROGRESS NOTE ADULT - SUBJECTIVE AND OBJECTIVE BOX
NSCU ATTENDING -- ADDITIONAL PROGRESS NOTE    Nighttime rounds were performed -- please refer to earlier Progress Note for HPI details.    T(C): 36.9 (06-18-20 @ 19:00), Max: 37.1 (06-18-20 @ 14:45)  HR: 96 (06-18-20 @ 20:22) (91 - 114)  BP: 122/74 (06-18-20 @ 19:00) (91/78 - 228/169)  RR: 23 (06-18-20 @ 20:00) (14 - 26)  SpO2: 100% (06-18-20 @ 20:22) (100% - 100%)  Wt(kg): --    Relevant labwork and imaging reviewed.    Ethics involved.  Pending ultimate goals of care/disposition.

## 2020-06-18 NOTE — PROGRESS NOTE ADULT - PROBLEM SELECTOR PLAN 2
-Levothyroxine 150mcg daily  -Hold TF 1 hour prior and after levothyroxine given.    pager: [phone]   cell: [phone]  office: [phone]    -I spent a total time of 25 mins with the patient at bedside/on unit of which more than 50% of time was spent on counseling/coordination of care.

## 2020-06-18 NOTE — PROGRESS NOTE ADULT - ASSESSMENT
ASSESSMENT:  Left frontal intracerebral hemorrhage, generalized periodic discharges, UTI.       NEURO:  Neuro q4h checks  seizure management: Keppra and Vimpat  Pain management with Tylenol & fentanyl prn  Given previous psych history and workup not revealing any cause, NMDA encephalitis testing sent -> NMDA AB neg.    Trach and PEG being discussed with family. no decision yet   Activity: [] OOB as tolerated [] Bedrest [X] PT [X] OT [] PMNR      PULM:  Intubated since 5/29- Day 19 of intubation.  H/O of COPD   CPAP as tolerated. Has failed CPAP before   A lot of oral secretions- added scopolamine patch       CV:  Keep -160  TTE negative for PFO  HTN: Continue norvasc   sinus tachycardia, metoprolol 25 q8h  Statin for HLD        RENAL:  IVL. Monitor Is/Os  Female iraheta  on cardura for urinary retention      GI:  Diet: On Glucerna OGT feeds  Continue Banana flakes and metamucil , LBM 6/17   GI prophylaxis [] not indicated [X] PO PPI [] other:        ENDO: S/P hyperglycemia - IR DM  Endo Following for adjustment of NPH; will follow up on sugar levels  Continue PO synthroid  Continue prednisone 15mg, 5 mg at bedtime (was on it at home for RA). Likely etiology of bruising  Goal euglycemia (-180)      HEME/ONC:  On SQL for DVT ppx- 30mg SQ q12.  VTE prophylaxis: [X] SCDs [X] chemoprophylaxis [X] high risk of DVT/PE on admission due to: bed bound at baseline  LE doppler 5/30- No DVT        ID:  Afebrile   Ertapnem for ESBL found growing in urine culture on 6/4 - ended on  - 6/14      MISC: Palliative care consulted. Previous multiple family meetings- yield no decision. Ethics following  Family meeting was scheduled for 6/12 with daughter Heavenly, son (Norris), SW, Ethics and palliative care. However, son Norris stated that he could not get all family members to be a part of meeting and requested for it to be postponed until family meeting Tuesday, 6/16. However, family did not make decision during that meeting as well.  Ethics as tried to get in contact with family again for a follow up meeting but have not answered.       SOCIAL/FAMILY:  [X] updated family via phone  [] family meeting    CODE STATUS:  [X] Full Code     DISPOSITION:  [X] ICU    [X] Patient is at high risk of neurologic deterioration/death due to: resp failure, seizures, hemorrhage.

## 2020-06-18 NOTE — PROGRESS NOTE ADULT - PROBLEM SELECTOR PLAN 1
-test BG Q6h  -Adjust NPH as follows  -change NPH 14 units @ 6pm  -change NPH 18 units @ 12am  -c/w NPH 35 units @ 12pm  -c/w NPH 46 units @ 6am  -Hold NPH if TF off. Can reduce dose if BG less than 100mg/dl  -c/w Humalog moderate correction scale Q6h  Please notify endocrine if any changes in steroid regimen or TF regimen so we can adjust insulin regimen accordingly.

## 2020-06-19 LAB
GLUCOSE BLDC GLUCOMTR-MCNC: 129 MG/DL — HIGH (ref 70–99)
GLUCOSE BLDC GLUCOMTR-MCNC: 146 MG/DL — HIGH (ref 70–99)
GLUCOSE BLDC GLUCOMTR-MCNC: 175 MG/DL — HIGH (ref 70–99)

## 2020-06-19 PROCEDURE — 99291 CRITICAL CARE FIRST HOUR: CPT

## 2020-06-19 PROCEDURE — 99232 SBSQ HOSP IP/OBS MODERATE 35: CPT

## 2020-06-19 PROCEDURE — 99233 SBSQ HOSP IP/OBS HIGH 50: CPT

## 2020-06-19 PROCEDURE — 99497 ADVNCD CARE PLAN 30 MIN: CPT

## 2020-06-19 RX ORDER — OXYCODONE HYDROCHLORIDE 5 MG/1
5 TABLET ORAL EVERY 6 HOURS
Refills: 0 | Status: DISCONTINUED | OUTPATIENT
Start: 2020-06-19 | End: 2020-06-20

## 2020-06-19 RX ORDER — ROBINUL 0.2 MG/ML
0.1 INJECTION INTRAMUSCULAR; INTRAVENOUS THREE TIMES A DAY
Refills: 0 | Status: DISCONTINUED | OUTPATIENT
Start: 2020-06-19 | End: 2020-07-05

## 2020-06-19 RX ORDER — OXYCODONE HYDROCHLORIDE 5 MG/1
10 TABLET ORAL EVERY 6 HOURS
Refills: 0 | Status: DISCONTINUED | OUTPATIENT
Start: 2020-06-19 | End: 2020-06-25

## 2020-06-19 RX ADMIN — SIMVASTATIN 20 MILLIGRAM(S): 20 TABLET, FILM COATED ORAL at 21:30

## 2020-06-19 RX ADMIN — Medication 25 MILLIGRAM(S): at 16:36

## 2020-06-19 RX ADMIN — LACOSAMIDE 200 MILLIGRAM(S): 50 TABLET ORAL at 16:45

## 2020-06-19 RX ADMIN — Medication 150 MICROGRAM(S): at 04:06

## 2020-06-19 RX ADMIN — HUMAN INSULIN 35 UNIT(S): 100 INJECTION, SUSPENSION SUBCUTANEOUS at 11:01

## 2020-06-19 RX ADMIN — HUMAN INSULIN 46 UNIT(S): 100 INJECTION, SUSPENSION SUBCUTANEOUS at 04:27

## 2020-06-19 RX ADMIN — CHLORHEXIDINE GLUCONATE 15 MILLILITER(S): 213 SOLUTION TOPICAL at 04:07

## 2020-06-19 RX ADMIN — Medication 2: at 11:01

## 2020-06-19 RX ADMIN — CHLORHEXIDINE GLUCONATE 1 APPLICATION(S): 213 SOLUTION TOPICAL at 21:25

## 2020-06-19 RX ADMIN — ENOXAPARIN SODIUM 30 MILLIGRAM(S): 100 INJECTION SUBCUTANEOUS at 04:06

## 2020-06-19 RX ADMIN — Medication 25 MILLIGRAM(S): at 04:07

## 2020-06-19 RX ADMIN — LACOSAMIDE 200 MILLIGRAM(S): 50 TABLET ORAL at 04:05

## 2020-06-19 RX ADMIN — Medication 1 PACKET(S): at 21:30

## 2020-06-19 RX ADMIN — SCOPALAMINE 1 PATCH: 1 PATCH, EXTENDED RELEASE TRANSDERMAL at 07:19

## 2020-06-19 RX ADMIN — Medication 25 MILLIGRAM(S): at 21:30

## 2020-06-19 RX ADMIN — LEVETIRACETAM 1000 MILLIGRAM(S): 250 TABLET, FILM COATED ORAL at 04:05

## 2020-06-19 RX ADMIN — PANTOPRAZOLE SODIUM 40 MILLIGRAM(S): 20 TABLET, DELAYED RELEASE ORAL at 11:05

## 2020-06-19 RX ADMIN — Medication 1 PACKET(S): at 04:05

## 2020-06-19 RX ADMIN — ENOXAPARIN SODIUM 30 MILLIGRAM(S): 100 INJECTION SUBCUTANEOUS at 16:38

## 2020-06-19 RX ADMIN — OXYCODONE HYDROCHLORIDE 5 MILLIGRAM(S): 5 TABLET ORAL at 14:26

## 2020-06-19 RX ADMIN — ROBINUL 0.1 MILLIGRAM(S): 0.2 INJECTION INTRAMUSCULAR; INTRAVENOUS at 11:19

## 2020-06-19 RX ADMIN — CHLORHEXIDINE GLUCONATE 15 MILLILITER(S): 213 SOLUTION TOPICAL at 16:39

## 2020-06-19 RX ADMIN — Medication 1 PACKET(S): at 14:27

## 2020-06-19 RX ADMIN — SCOPALAMINE 1 PATCH: 1 PATCH, EXTENDED RELEASE TRANSDERMAL at 18:00

## 2020-06-19 RX ADMIN — Medication 5 MILLIGRAM(S): at 21:30

## 2020-06-19 RX ADMIN — Medication 2 MILLIGRAM(S): at 21:25

## 2020-06-19 RX ADMIN — LEVETIRACETAM 1000 MILLIGRAM(S): 250 TABLET, FILM COATED ORAL at 16:38

## 2020-06-19 RX ADMIN — AMLODIPINE BESYLATE 10 MILLIGRAM(S): 2.5 TABLET ORAL at 04:05

## 2020-06-19 RX ADMIN — HUMAN INSULIN 14 UNIT(S): 100 INJECTION, SUSPENSION SUBCUTANEOUS at 18:00

## 2020-06-19 RX ADMIN — Medication 15 MILLIGRAM(S): at 04:20

## 2020-06-19 NOTE — PROGRESS NOTE ADULT - PROBLEM SELECTOR PLAN 2
-Levothyroxine 150mcg daily  -Hold TF 1 hour prior and after levothyroxine given.    pager: [phone]   cell: [phone]  office: [phone]    -I spent a total time of 25 mins with the patient at bedside/on unit of which more than 50% of time was spent on counseling/coordination of care.

## 2020-06-19 NOTE — PROGRESS NOTE ADULT - SUBJECTIVE AND OBJECTIVE BOX
HPI:  65 yo F with AMS since this morning.  Pt. was confused, didn't recognize son this morning.  He notes she was sleeping more than usual yesterday, which tipped off son that something was wrong.  At 11 am pt. was gasping for air like she was seizing. 2 hours ago pt. was dry heaving, her chest was going up and down and she started shaking like she was having a seizure.  Pt. has no history of seizures per son.  		PMH: hypothyroidism, HTN, IDDM, COPD/CHRIS on home O2, RA (on Prednisone), SBO s/p ex lap with lysis of adhesions c/b evisceration,  recent hospitalization in 3/2020 with encephalopathy, significantly hypothyroid (, without myxedema coma), small (stable) right sided SDH, UTI, catatonic reaction to VPA (now resolved) and DKA and psychiatric issues, 5/2020 hospitalization for GIB, Emphysematous cystitis, endometritis, Sigmoid colitis, morbid obesity, functional quadriplegia    HCP: Norris Rush, Son [phone]; Daughter, Heavenly Schmidt [phone]; son asks that only him or his sister, Heavenly be contacted for patient updates/information as they are both proxy's for patient (29 May 2020 23:41)    OVERNIGHT EVENTS: none            ICU Vital Signs Last 24 Hrs  T(C): 37.2 (19 Jun 2020 03:00), Max: 37.2 (19 Jun 2020 03:00)  T(F): 98.9 (19 Jun 2020 03:00), Max: 98.9 (19 Jun 2020 03:00)  HR: 105 (19 Jun 2020 04:29) (91 - 105)  BP: 126/68 (19 Jun 2020 03:00) (91/78 - 228/169)  BP(mean): 85 (19 Jun 2020 03:00) (78 - 105)  RR: 20 (19 Jun 2020 03:00) (14 - 23)  SpO2: 100% (19 Jun 2020 04:29) (100% - 100%)      06-17-20 @ 07:01  -  06-18-20 @ 07:00  --------------------------------------------------------  IN: 2390 mL / OUT: 1280 mL / NET: 1110 mL    06-18-20 @ 07:01  -  06-19-20 @ 06:57  --------------------------------------------------------  IN: 1750 mL / OUT: 1000 mL / NET: 750 mL        Mode: AC/ CMV (Assist Control/ Continuous Mandatory Ventilation), RR (machine): 14, TV (machine): 450, FiO2: 40, PEEP: 5, ITime: 1, MAP: 10, PIP: 20    acetaminophen    Suspension .. 650 milliGRAM(s) Oral every 6 hours PRN  amLODIPine   Tablet 10 milliGRAM(s) Oral daily  doxazosin 2 milliGRAM(s) Oral at bedtime  enoxaparin Injectable 30 milliGRAM(s) SubCutaneous two times a day  fentaNYL    Injectable 25 MICROGram(s) IV Push every 4 hours PRN  insulin lispro (HumaLOG) corrective regimen sliding scale   SubCutaneous every 6 hours  insulin NPH human recombinant 35 Unit(s) SubCutaneous <User Schedule>  insulin NPH human recombinant 46 Unit(s) SubCutaneous <User Schedule>  insulin NPH human recombinant 18 Unit(s) SubCutaneous <User Schedule>  insulin NPH human recombinant 14 Unit(s) SubCutaneous <User Schedule>  lacosamide Solution 200 milliGRAM(s) Oral two times a day  levETIRAcetam  Solution 1000 milliGRAM(s) Oral two times a day  levothyroxine 150 MICROGram(s) Oral daily  metoprolol tartrate 25 milliGRAM(s) Oral every 8 hours  pantoprazole   Suspension 40 milliGRAM(s) Oral daily  predniSONE   Tablet 15 milliGRAM(s) Oral <User Schedule>  predniSONE   Tablet 5 milliGRAM(s) Oral at bedtime  psyllium Powder 1 Packet(s) Oral three times a day  scopolamine 1 mG/72 Hr(s) Patch 1 Patch Transdermal every 72 hours  simvastatin 20 milliGRAM(s) Oral at bedtime  sodium chloride 0.9% lock flush 10 milliLiter(s) IV Push every 1 hour PRN      LABS:  Na: 145 (06-17 @ 22:39)  K: 4.0 (06-17 @ 22:39)  Cl: 108 (06-17 @ 22:39)  CO2: 24 (06-17 @ 22:39)  BUN: 21 (06-17 @ 22:39)  Cr: 0.56 (06-17 @ 22:39)  Glu: 105(06-17 @ 22:39)    Hgb: 8.6 (06-17 @ 22:39)  Hct: 28.8 (06-17 @ 22:39)  WBC: 8.45 (06-17 @ 22:39)  Plt: 424 (06-17 @ 22:39)                PHYSICAL EXAM:  Neurological:  intubated, EO spont, does not track, disconjugated gaze, pupils 3mm reactive, +cough/gag, not follow commands, upper extremities tremors when stimulated, LE TF  Cardiovascular: +s1, s2  Respiratory: clear to auscultation b/l  Gastrointestinal: soft, non-distended, non-tender  abd: soft, non-tender, nl bs ,+ skin lesion  Extremities: Warm, dry  + skin tear on buttocks

## 2020-06-19 NOTE — PROGRESS NOTE ADULT - SUBJECTIVE AND OBJECTIVE BOX
Chief Complaint:     History:    MEDICATIONS  (STANDING):  amLODIPine   Tablet 10 milliGRAM(s) Oral daily  chlorhexidine 0.12% Liquid 15 milliLiter(s) Oral Mucosa every 12 hours  chlorhexidine 4% Liquid 1 Application(s) Topical <User Schedule>  dextrose 5%. 1000 milliLiter(s) (50 mL/Hr) IV Continuous <Continuous>  dextrose 50% Injectable 25 Gram(s) IV Push once  doxazosin 2 milliGRAM(s) Oral at bedtime  enoxaparin Injectable 30 milliGRAM(s) SubCutaneous two times a day  insulin lispro (HumaLOG) corrective regimen sliding scale   SubCutaneous every 6 hours  insulin NPH human recombinant 18 Unit(s) SubCutaneous <User Schedule>  insulin NPH human recombinant 14 Unit(s) SubCutaneous <User Schedule>  insulin NPH human recombinant 35 Unit(s) SubCutaneous <User Schedule>  insulin NPH human recombinant 46 Unit(s) SubCutaneous <User Schedule>  lacosamide Solution 200 milliGRAM(s) Oral two times a day  levETIRAcetam  Solution 1000 milliGRAM(s) Oral two times a day  levothyroxine 150 MICROGram(s) Oral daily  metoprolol tartrate 25 milliGRAM(s) Oral every 8 hours  pantoprazole   Suspension 40 milliGRAM(s) Oral daily  predniSONE   Tablet 15 milliGRAM(s) Oral <User Schedule>  predniSONE   Tablet 5 milliGRAM(s) Oral at bedtime  psyllium Powder 1 Packet(s) Oral three times a day  scopolamine 1 mG/72 Hr(s) Patch 1 Patch Transdermal every 72 hours  simvastatin 20 milliGRAM(s) Oral at bedtime    MEDICATIONS  (PRN):  acetaminophen    Suspension .. 650 milliGRAM(s) Oral every 6 hours PRN Temp greater or equal to 38C (100.4F), Mild Pain (1 - 3)  glycopyrrolate Injectable 0.1 milliGRAM(s) IV Push three times a day PRN secretions  oxyCODONE    IR 5 milliGRAM(s) Oral every 6 hours PRN Moderate Pain (4 - 6)  oxyCODONE    IR 10 milliGRAM(s) Oral every 6 hours PRN Severe Pain (7 - 10)  sodium chloride 0.9% lock flush 10 milliLiter(s) IV Push every 1 hour PRN Pre/post blood products, medications, blood draw, and to maintain line patency      Allergies    Depakote (Other)  Seroquel (Other (Severe))    Intolerances      Review of Systems:  Constitutional: No fever  Eyes: No blurry vision  Neuro: No tremors  HEENT: No pain  Cardiovascular: No chest pain, palpitations  Respiratory: No SOB, no cough  GI: No nausea, vomiting, abdominal pain  : No dysuria  Skin: no rash  Psych: no depression  Endocrine: no polyuria, polydipsia  Hem/lymph: no swelling  Osteoporosis: no fractures    ALL OTHER SYSTEMS REVIEWED AND NEGATIVE    UNABLE TO OBTAIN    PHYSICAL EXAM:  VITALS: T(C): 37.1 (06-19-20 @ 08:09)  T(F): 98.7 (06-19-20 @ 08:09), Max: 98.9 (06-19-20 @ 03:00)  HR: 96 (06-19-20 @ 08:12) (91 - 105)  BP: 107/61 (06-19-20 @ 08:09) (91/78 - 228/169)  RR:  (15 - 23)  SpO2:  (100% - 100%)  Wt(kg): --  GENERAL: NAD, well-groomed, well-developed  EYES: No proptosis, no lid lag, anicteric  HEENT:  Atraumatic, Normocephalic, moist mucous membranes  THYROID: Normal size, no palpable nodules  RESPIRATORY: Clear to auscultation bilaterally; No rales, rhonchi, wheezing, or rubs  CARDIOVASCULAR: Regular rate and rhythm; No murmurs; no peripheral edema  GI: Soft, nontender, non distended, normal bowel sounds  SKIN: Dry, intact, No rashes or lesions  MUSCULOSKELETAL: Full range of motion, normal strength  NEURO: sensation intact, extraocular movements intact, no tremor, normal reflexes  PSYCH: Alert and oriented x 3, normal affect, normal mood  CUSHING'S SIGNS: no striae    POCT Blood Glucose.: 175 mg/dL (06-19-20 @ 10:59)  POCT Blood Glucose.: 146 mg/dL (06-19-20 @ 04:26)  POCT Blood Glucose.: 136 mg/dL (06-18-20 @ 23:17)  POCT Blood Glucose.: 162 mg/dL (06-18-20 @ 17:32)  POCT Blood Glucose.: 193 mg/dL (06-18-20 @ 10:54)  POCT Blood Glucose.: 224 mg/dL (06-18-20 @ 06:16)  POCT Blood Glucose.: 106 mg/dL (06-17-20 @ 23:46)  POCT Blood Glucose.: 118 mg/dL (06-17-20 @ 17:04)  POCT Blood Glucose.: 129 mg/dL (06-17-20 @ 11:38)  POCT Blood Glucose.: 122 mg/dL (06-17-20 @ 07:46)  POCT Blood Glucose.: 154 mg/dL (06-17-20 @ 05:14)  POCT Blood Glucose.: 101 mg/dL (06-16-20 @ 23:51)  POCT Blood Glucose.: 137 mg/dL (06-16-20 @ 17:21)  POCT Blood Glucose.: 273 mg/dL (06-16-20 @ 11:10)      06-17    145  |  108  |  21  ----------------------------<  105<H>  4.0   |  24  |  0.56    EGFR if : 116  EGFR if non : 100    Ca    9.0      06-17  Mg     2.2     06-17  Phos  3.5     06-17            Thyroid Function Tests:  06-11 @ 13:58 TSH 8.26 FreeT4 -- T3 55 Anti TPO -- Anti Thyroglobulin Ab -- TSI --  06-04 @ 01:58 TSH -- FreeT4 2.7 T3 -- Anti TPO -- Anti Thyroglobulin Ab -- TSI -- Chief Complaint: T2DM    History: BG values have been at goal.    MEDICATIONS  (STANDING):  amLODIPine   Tablet 10 milliGRAM(s) Oral daily  chlorhexidine 0.12% Liquid 15 milliLiter(s) Oral Mucosa every 12 hours  chlorhexidine 4% Liquid 1 Application(s) Topical <User Schedule>  dextrose 5%. 1000 milliLiter(s) (50 mL/Hr) IV Continuous <Continuous>  dextrose 50% Injectable 25 Gram(s) IV Push once  doxazosin 2 milliGRAM(s) Oral at bedtime  enoxaparin Injectable 30 milliGRAM(s) SubCutaneous two times a day  insulin lispro (HumaLOG) corrective regimen sliding scale   SubCutaneous every 6 hours  insulin NPH human recombinant 18 Unit(s) SubCutaneous <User Schedule>  insulin NPH human recombinant 14 Unit(s) SubCutaneous <User Schedule>  insulin NPH human recombinant 35 Unit(s) SubCutaneous <User Schedule>  insulin NPH human recombinant 46 Unit(s) SubCutaneous <User Schedule>  lacosamide Solution 200 milliGRAM(s) Oral two times a day  levETIRAcetam  Solution 1000 milliGRAM(s) Oral two times a day  levothyroxine 150 MICROGram(s) Oral daily  metoprolol tartrate 25 milliGRAM(s) Oral every 8 hours  pantoprazole   Suspension 40 milliGRAM(s) Oral daily  predniSONE   Tablet 15 milliGRAM(s) Oral <User Schedule>  predniSONE   Tablet 5 milliGRAM(s) Oral at bedtime  psyllium Powder 1 Packet(s) Oral three times a day  scopolamine 1 mG/72 Hr(s) Patch 1 Patch Transdermal every 72 hours  simvastatin 20 milliGRAM(s) Oral at bedtime    MEDICATIONS  (PRN):  acetaminophen    Suspension .. 650 milliGRAM(s) Oral every 6 hours PRN Temp greater or equal to 38C (100.4F), Mild Pain (1 - 3)  glycopyrrolate Injectable 0.1 milliGRAM(s) IV Push three times a day PRN secretions  oxyCODONE    IR 5 milliGRAM(s) Oral every 6 hours PRN Moderate Pain (4 - 6)  oxyCODONE    IR 10 milliGRAM(s) Oral every 6 hours PRN Severe Pain (7 - 10)  sodium chloride 0.9% lock flush 10 milliLiter(s) IV Push every 1 hour PRN Pre/post blood products, medications, blood draw, and to maintain line patency      Allergies    Depakote (Other)  Seroquel (Other (Severe))    Intolerances      Review of Systems:  Constitutional: No fever  Eyes: No blurry vision  Neuro: No tremors  HEENT: No pain  Cardiovascular: No chest pain, palpitations  Respiratory: No SOB, no cough  GI: No nausea, vomiting, abdominal pain  : No dysuria  Skin: no rash  Psych: no depression  Endocrine: no polyuria, polydipsia      ALL OTHER SYSTEMS REVIEWED AND NEGATIVE        PHYSICAL EXAM:  VITALS: T(C): 37.1 (06-19-20 @ 08:09)  T(F): 98.7 (06-19-20 @ 08:09), Max: 98.9 (06-19-20 @ 03:00)  HR: 96 (06-19-20 @ 08:12) (91 - 105)  BP: 107/61 (06-19-20 @ 08:09) (91/78 - 228/169)  RR:  (15 - 23)  SpO2:  (100% - 100%)  Wt(kg): --  GENERAL: NAD, well-groomed, well-developed  EYES: No proptosis, no lid lag, anicteric  HEENT:  Atraumatic, Normocephalic, moist mucous membranes  RESPIRATORY: Clear to auscultation bilaterally; No rales, rhonchi, wheezing, or rubs  CARDIOVASCULAR: Regular rate and rhythm; No murmurs  GI: Soft, nontender, non distended, normal bowel sounds  SKIN: Dry, intact, No rashes or lesions  PSYCH: Alert and oriented x 3, normal affect, normal mood      POCT Blood Glucose.: 175 mg/dL (06-19-20 @ 10:59)  POCT Blood Glucose.: 146 mg/dL (06-19-20 @ 04:26)  POCT Blood Glucose.: 136 mg/dL (06-18-20 @ 23:17)  POCT Blood Glucose.: 162 mg/dL (06-18-20 @ 17:32)  POCT Blood Glucose.: 193 mg/dL (06-18-20 @ 10:54)  POCT Blood Glucose.: 224 mg/dL (06-18-20 @ 06:16)  POCT Blood Glucose.: 106 mg/dL (06-17-20 @ 23:46)  POCT Blood Glucose.: 118 mg/dL (06-17-20 @ 17:04)  POCT Blood Glucose.: 129 mg/dL (06-17-20 @ 11:38)  POCT Blood Glucose.: 122 mg/dL (06-17-20 @ 07:46)  POCT Blood Glucose.: 154 mg/dL (06-17-20 @ 05:14)  POCT Blood Glucose.: 101 mg/dL (06-16-20 @ 23:51)  POCT Blood Glucose.: 137 mg/dL (06-16-20 @ 17:21)  POCT Blood Glucose.: 273 mg/dL (06-16-20 @ 11:10)      06-17    145  |  108  |  21  ----------------------------<  105<H>  4.0   |  24  |  0.56    EGFR if : 116  EGFR if non : 100    Ca    9.0      06-17  Mg     2.2     06-17  Phos  3.5     06-17            Thyroid Function Tests:  06-11 @ 13:58 TSH 8.26 FreeT4 -- T3 55 Anti TPO -- Anti Thyroglobulin Ab -- TSI --  06-04 @ 01:58 TSH -- FreeT4 2.7 T3 -- Anti TPO -- Anti Thyroglobulin Ab -- TSI --

## 2020-06-19 NOTE — PROGRESS NOTE ADULT - PROBLEM SELECTOR PLAN 6
Spoke with NSCU team.  Ethics notes reviewed. Spoke with Heavenly Pts dtr and HCP for greater the 20 mins.  Heavenly states that there is family that needs to come and visit the pts as they have not been able to see her due to restricted visitation.  Pts 2 sisters will be coming and then they will further discuss GOC.

## 2020-06-19 NOTE — PROGRESS NOTE ADULT - SUBJECTIVE AND OBJECTIVE BOX
NSCU ATTENDING -- ADDITIONAL PROGRESS NOTE    Nighttime rounds were performed -- please refer to earlier Progress Note for HPI details.    T(C): 37.3 (06-19-20 @ 20:00), Max: 37.7 (06-19-20 @ 16:00)  HR: 107 (06-19-20 @ 20:19) (91 - 114)  BP: 126/71 (06-19-20 @ 20:00) (104/51 - 126/71)  RR: 25 (06-19-20 @ 20:00) (15 - 28)  SpO2: 100% (06-19-20 @ 20:19) (100% - 100%)  Wt(kg): --    Relevant labwork and imaging reviewed.    Patient remains critically ill.    Ethics involved.  Still awaiting goals of care decisions.  Family were expected today but it does not appear that they will be here.

## 2020-06-19 NOTE — PROGRESS NOTE ADULT - ATTENDING COMMENTS
Navya Davidson (pager [phone])  On evenings and weekends, please call [phone] or page endocrine fellow on call.   Please note that this patient may be followed by different provider tomorrow. If no answer, contact endocrine fellow on call.

## 2020-06-19 NOTE — PROGRESS NOTE ADULT - SUBJECTIVE AND OBJECTIVE BOX
SUBJECTIVE AND OBJECTIVE:   INTERVAL HPI/OVERNIGHT EVENTS:  Patient remains unresponsive, vent dependent with poor neuro exam  DNR on chart: no  Allergies    Depakote (Other)  Seroquel (Other (Severe))    Intolerances    MEDICATIONS  (STANDING):  amLODIPine   Tablet 10 milliGRAM(s) Oral daily  chlorhexidine 0.12% Liquid 15 milliLiter(s) Oral Mucosa every 12 hours  chlorhexidine 4% Liquid 1 Application(s) Topical <User Schedule>  dextrose 5%. 1000 milliLiter(s) (50 mL/Hr) IV Continuous <Continuous>  dextrose 50% Injectable 25 Gram(s) IV Push once  doxazosin 2 milliGRAM(s) Oral at bedtime  enoxaparin Injectable 30 milliGRAM(s) SubCutaneous two times a day  insulin lispro (HumaLOG) corrective regimen sliding scale   SubCutaneous every 6 hours  insulin NPH human recombinant 40 Unit(s) SubCutaneous <User Schedule>  insulin NPH human recombinant 20 Unit(s) SubCutaneous <User Schedule>  insulin NPH human recombinant 44 Unit(s) SubCutaneous <User Schedule>  lacosamide Solution 200 milliGRAM(s) Oral two times a day  levETIRAcetam  Solution 1000 milliGRAM(s) Oral two times a day  levothyroxine 150 MICROGram(s) Oral daily  metoprolol tartrate 25 milliGRAM(s) Oral every 8 hours  pantoprazole   Suspension 40 milliGRAM(s) Oral daily  predniSONE   Tablet 15 milliGRAM(s) Oral <User Schedule>  predniSONE   Tablet 5 milliGRAM(s) Oral at bedtime  psyllium Powder 1 Packet(s) Oral three times a day  simvastatin 20 milliGRAM(s) Oral at bedtime    MEDICATIONS  (PRN):  acetaminophen    Suspension .. 650 milliGRAM(s) Oral every 6 hours PRN Temp greater or equal to 38C (100.4F), Mild Pain (1 - 3)  sodium chloride 0.9% lock flush 10 milliLiter(s) IV Push every 1 hour PRN Pre/post blood products, medications, blood draw, and to maintain line patency      ITEMS UNCHECKED ARE NOT PRESENT    PRESENT SYMPTOMS: [ x]Unable to obtain due to poor mentation   Source if other than patient:  [ ]Family   [ ]Team     Pain:  [ ]yes [ ]no  QOL impact -   Location -                    Aggravating factors -  Quality -  Radiation -  Timing-  Severity (0-10 scale):  Minimal acceptable level (0-10 scale):     Dyspnea:                           [ ]Mild [ ]Moderate [ ]Severe  Anxiety:                             [ ]Mild [ ]Moderate [ ]Severe  Fatigue:                             [ ]Mild [ ]Moderate [ ]Severe  Nausea:                             [ ]Mild [ ]Moderate [ ]Severe  Loss of appetite:              [ ]Mild [ ]Moderate [ ]Severe  Constipation:                    [ ]Mild [ ]Moderate [ ]Severe    PAIN AD Score:	0  [email or web address].[email or web address]/painad.pdf (Ctrl + left click to view)    Other Symptoms:  [ ]All other review of systems negative     Palliative Performance Status Version 2:       10  %      http://npcrc.org/files/news/palliative_performance_scale_ppsv2.pdf    PHYSICAL EXAM:  Vital Signs Last 24 Hrs  T(C): 37.1 (19 Jun 2020 08:09), Max: 37.2 (19 Jun 2020 03:00)  T(F): 98.7 (19 Jun 2020 08:09), Max: 98.9 (19 Jun 2020 03:00)  HR: 102 (19 Jun 2020 11:21) (91 - 105)  BP: 107/61 (19 Jun 2020 08:09) (91/78 - 228/169)  BP(mean): 74 (19 Jun 2020 08:09) (74 - 105)  RR: 21 (19 Jun 2020 08:09) (15 - 23)  SpO2: 100% (19 Jun 2020 11:21) (100% - 100%)       GENERAL:  [ ]Alert  [ ]Oriented x   [ x]Lethargic  [ ]Cachexia  [ ]Unarousable  [ ]Verbal  [x ]Non-Verbal  Behavioral:   [ ]Anxiety  [ ]Delirium [ ]Agitation [ ]Other  HEENT:  [ ]Normal   [ ]Dry mouth   [x ]ET Tube/Trach  [ ]Oral lesions  PULMONARY: vented  [ ]Clear [ ]Tachypnea  [ ]Audible excessive secretions   [ ]Rhonchi        [ ]Right [ ]Left [ ]Bilateral  [ ]Crackles        [ ]Right [ ]Left [ ]Bilateral  [ ]Wheezing     [ ]Right [ ]Left [ ]Bilateral  [ ]Diminished BS [ ] Right [ ]Left [ ]Bilateral  CARDIOVASCULAR:    [ ]Regular [ x]Irregular [ x]Tachy  [ ]Ubaldo [ ]Murmur [ ]Other  GASTROINTESTINAL:  [x ]Soft  [ ]Distended   [x ]+BS  [ ]Non tender [ ]Tender  [ ]PEG [ x]OGT/ NGT   Last BM:   GENITOURINARY:  [ ]Normal [x ]Incontinent   [ ]Oliguria/Anuria   [ ]Antoine  MUSCULOSKELETAL:   [ ]Normal   [ ]Weakness  [x ]Bed/Wheelchair bound [ ]Edema  NEUROLOGIC:   [ ]No focal deficits  [ x] Cognitive impairment  [ ] Dysphagia [ ]Dysarthria [ ] Paresis [ ]Other   SKIN:   [ ]Normal  [ ]Rash   [ ]Pressure ulcer(s) [ ]y [ ]n present on admission    CRITICAL CARE:  [ ]Shock Present  [ ]Septic [ ]Cardiogenic [ ]Neurologic [ ]Hypovolemic  [ ]Vasopressors [ ]Inotropes  [ x]Respiratory failure present x[ ]Mechanical Ventilation [ ]Non-invasive ventilatory support [ ]High-Flow  [x ]Acute  [ x]Chronic [ ]Hypoxic  [ ]Hypercarbic [ ]Other  [ ]Other organ failure     LABS:                        8.5    8.56  )-----------( 446      ( 15 Nile 2020 22:25 )             27.6   06-15    146<H>  |  109<H>  |  20  ----------------------------<  100<H>  3.9   |  25  |  0.54    Ca    8.9      15 Nile 2020 22:25  Phos  3.6     06-15  Mg     2.1     06-15          RADIOLOGY & ADDITIONAL STUDIES:      < from: Xray Chest 1 View- PORTABLE-Routine (06.16.20 @ 06:12) >        INTERPRETATION:  EXAMINATION: XR CHEST    CLINICAL INDICATION: Intubated patient    TECHNIQUE: Single portable view of the chest was obtained.    COMPARISON: Chest x-ray acquired 6/15/2020.    FINDINGS:   Evaluation lung fields limited as the left costodiaphragmatic angle is, and out of the field-of-view.    The cardiomediastinal silhouette is not well evaluated in this projection.    Right-sided IJ catheter is present with tip at the level the SVC    ET tube is present below the level of thoracic inlet and above the corwin.    Enteric catheter is present courses below the level the diaphragm the tip is not fully visualized in the present examination.    No focal consolidations, large pleural effusions or pneumothoraces within the visualized lungs.    Degenerative changes of the spine.    IMPRESSION:   Lines and tubes as above.    Stable examination from prior within the visualized portions of the lungs.        < end of copied text >    Protein Calorie Malnutrition Present: [ ]mild [ ]moderate [ ]severe [ ]underweight [ ]morbid obesity  https://www.andeal.org/vault/5000/web/files/ONC/Table_Clinical%20Characteristics%20to%20Document%20Malnutrition-White%20JV%20et%20al%202012.pdf    Height (cm): 162.6 (05-30-20 @ 00:49), 152.4 (05-29-20 @ 19:18), 154.9 (05-12-20 @ 21:48)  Weight (kg): 99.6 (05-30-20 @ 00:49), 136.1 (05-29-20 @ 19:18), 98.2 (05-12-20 @ 23:57)  BMI (kg/m2): 37.7 (05-30-20 @ 00:49), 51.5 (05-30-20 @ 00:49), 58.6 (05-29-20 @ 19:18)    [ ]PPSV2 < or = 30%  [ ]significant weight loss [ ]poor nutritional intake [ ]anasarca   Albumin, Serum: 2.7 g/dL (05-29-20 @ 20:22)   [ ]Artificial Nutrition    REFERRALS:   [ ]Chaplaincy  [ ]Hospice  [ ]Child Life  [ x]Social Work  [ ]Case management [ ]Holistic Therapy     Goals of Care Document:

## 2020-06-19 NOTE — PROGRESS NOTE ADULT - PROBLEM SELECTOR PLAN 1
-test BG Q6h  -Adjust NPH as follows  -change NPH 14 units @ 6pm  -change NPH 18 units @ 12am  -c/w NPH 35 units @ 12pm  -c/w NPH 46 units @ 6am  -Hold NPH if TF off. Can reduce dose if BG less than 100mg/dl  -c/w Humalog moderate correction scale Q6h  Please notify endocrine if any changes in steroid regimen or TF regimen so we can adjust insulin regimen accordingly. -test BG Q6h  -c/w NPH 14 units @ 6pm  -c/w NPH 18 units @ 12am  -c/w NPH 35 units @ 12pm  -c/w NPH 46 units @ 6am  -Hold NPH if TF off. Can reduce dose if BG less than 100mg/dl  -c/w Humalog moderate correction scale Q6h  Please notify endocrine if any changes in steroid regimen or TF regimen so we can adjust insulin regimen accordingly.

## 2020-06-19 NOTE — PROGRESS NOTE ADULT - ASSESSMENT
ASSESSMENT:  Left frontal intracerebral hemorrhage, generalized periodic discharges, UTI.       NEURO:  Neuro q4h checks  seizure management: Keppra and Vimpat  Pain management with Tylenol & fentanyl prn  Given previous psych history and workup not revealing any cause, NMDA encephalitis testing sent -> NMDA AB neg.    Trach and PEG being discussed with family. no decision yet   Activity: [] OOB as tolerated [] Bedrest [X] PT [X] OT [] PMNR      PULM:  Intubated since 5/29- Day 19 of intubation.  H/O of COPD   CPAP as tolerated. Has failed CPAP before   A lot of oral secretions- added scopolamine patch       CV:  Keep -160  TTE negative for PFO  HTN: Continue norvasc   sinus tachycardia, metoprolol 25 q8h  Statin for HLD        RENAL:  IVL. Monitor Is/Os  Female iraheta  on cardura for urinary retention      GI:  Diet: On Glucerna OGT feeds  Continue Banana flakes and metamucil , LBM 6/17   GI prophylaxis [] not indicated [X] PO PPI [] other:        ENDO: S/P hyperglycemia - IR DM  Endo Following for adjustment of NPH; will follow up on sugar levels  Continue PO synthroid  Continue prednisone 15mg, 5 mg at bedtime (was on it at home for RA). Likely etiology of bruising  Goal euglycemia (-180)      HEME/ONC:  On SQL for DVT ppx- 30mg SQ q12.  VTE prophylaxis: [X] SCDs [X] chemoprophylaxis [X] high risk of DVT/PE on admission due to: bed bound at baseline  LE doppler 5/30- No DVT        ID:  Afebrile   Ertapnem for ESBL found growing in urine culture on 6/4 - ended on  - 6/14      MISC: Palliative care consulted. Previous multiple family meetings- yield no decision. Ethics following  Family meeting was scheduled for 6/12 with daughter Heavenly, son (Norris), SW, Ethics and palliative care. However, son Norris stated that he could not get all family members to be a part of meeting and requested for it to be postponed until family meeting Tuesday, 6/16. However, family did not make decision during that meeting as well.  Ethics as tried to get in contact with family again for a follow up meeting but have not answered.       SOCIAL/FAMILY:  [X] updated family via phone  [] family meeting    CODE STATUS:  [X] Full Code     DISPOSITION:  [X] ICU    [X] Patient is at high risk of neurologic deterioration/death due to: resp failure, seizures, hemorrhage. ASSESSMENT:  Left frontal intracerebral hemorrhage, generalized periodic discharges, UTI.       NEURO:  Neuro q4h checks  seizure management: Keppra and Vimpat  Pain management with Tylenol & fentanyl prn  Given previous psych history and workup not revealing any cause, NMDA encephalitis testing sent -> NMDA AB neg.    Trach and PEG being discussed with family. no decision yet   Activity: [] OOB as tolerated [] Bedrest [X] PT [X] OT [] PMNR      PULM:  Intubated since 5/29- Day 19 of intubation.  H/O of COPD   CPAP as tolerated. Has failed CPAP before   A lot of oral secretions- added scopolamine patch       CV:  Keep -160  TTE negative for PFO  HTN: Continue norvasc   sinus tachycardia, metoprolol 25 q8h  Statin for HLD        RENAL:  IVL. Monitor Is/Os  Female iraheta  on cardura for urinary retention      GI:  Diet: On Glucerna OGT feeds  Continue Banana flakes and metamucil , LBM 6/17   GI prophylaxis [] not indicated [X] PO PPI [] other:        ENDO: S/P hyperglycemia - IR DM  Endo Following for adjustment of NPH; will follow up on sugar levels  Continue PO synthroid  Continue prednisone 15mg, 5 mg at bedtime (was on it at home for RA). Likely etiology of bruising  Goal euglycemia (-180)      HEME/ONC:  On SQL for DVT ppx- 30mg SQ q12.  VTE prophylaxis: [X] SCDs [X] chemoprophylaxis [X] high risk of DVT/PE on admission due to: bed bound at baseline  LE doppler 5/30- No DVT        ID:  Afebrile   Ertapnem for ESBL found growing in urine culture on 6/4 - ended on  - 6/14      MISC: Family meeting with palliative/ethics/icu on 6/16. Family understands all options are bad--trach/PEG vs. compassionate extubation. No decisions were made despite palliative care follow up after the meeting. Ethics spoke with Heavenly on 6/18--see documentation. No decisions made.       SOCIAL/FAMILY:  [X] updated family via phone  [] family meeting    CODE STATUS:  [X] Full Code     DISPOSITION:  [X] ICU    [X] Patient is at high risk of neurologic deterioration/death due to: resp failure, seizures, hemorrhage.

## 2020-06-20 LAB
ANION GAP SERPL CALC-SCNC: 11 MMOL/L — SIGNIFICANT CHANGE UP (ref 5–17)
APTT BLD: 28.8 SEC — SIGNIFICANT CHANGE UP (ref 27.5–36.3)
BUN SERPL-MCNC: 19 MG/DL — SIGNIFICANT CHANGE UP (ref 7–23)
CALCIUM SERPL-MCNC: 9 MG/DL — SIGNIFICANT CHANGE UP (ref 8.4–10.5)
CHLORIDE SERPL-SCNC: 106 MMOL/L — SIGNIFICANT CHANGE UP (ref 96–108)
CO2 SERPL-SCNC: 27 MMOL/L — SIGNIFICANT CHANGE UP (ref 22–31)
CREAT SERPL-MCNC: 0.62 MG/DL — SIGNIFICANT CHANGE UP (ref 0.5–1.3)
GLUCOSE BLDC GLUCOMTR-MCNC: 109 MG/DL — HIGH (ref 70–99)
GLUCOSE BLDC GLUCOMTR-MCNC: 110 MG/DL — HIGH (ref 70–99)
GLUCOSE BLDC GLUCOMTR-MCNC: 201 MG/DL — HIGH (ref 70–99)
GLUCOSE BLDC GLUCOMTR-MCNC: 258 MG/DL — HIGH (ref 70–99)
GLUCOSE SERPL-MCNC: 147 MG/DL — HIGH (ref 70–99)
HCT VFR BLD CALC: 27.8 % — LOW (ref 34.5–45)
HGB BLD-MCNC: 8.3 G/DL — LOW (ref 11.5–15.5)
INR BLD: 1.06 RATIO — SIGNIFICANT CHANGE UP (ref 0.88–1.16)
MAGNESIUM SERPL-MCNC: 2.2 MG/DL — SIGNIFICANT CHANGE UP (ref 1.6–2.6)
MCHC RBC-ENTMCNC: 28.9 PG — SIGNIFICANT CHANGE UP (ref 27–34)
MCHC RBC-ENTMCNC: 29.9 GM/DL — LOW (ref 32–36)
MCV RBC AUTO: 96.9 FL — SIGNIFICANT CHANGE UP (ref 80–100)
NRBC # BLD: 0 /100 WBCS — SIGNIFICANT CHANGE UP (ref 0–0)
PHOSPHATE SERPL-MCNC: 3.3 MG/DL — SIGNIFICANT CHANGE UP (ref 2.5–4.5)
PLATELET # BLD AUTO: 380 K/UL — SIGNIFICANT CHANGE UP (ref 150–400)
POTASSIUM SERPL-MCNC: 4.2 MMOL/L — SIGNIFICANT CHANGE UP (ref 3.5–5.3)
POTASSIUM SERPL-SCNC: 4.2 MMOL/L — SIGNIFICANT CHANGE UP (ref 3.5–5.3)
PROTHROM AB SERPL-ACNC: 12.1 SEC — SIGNIFICANT CHANGE UP (ref 10–12.9)
RBC # BLD: 2.87 M/UL — LOW (ref 3.8–5.2)
RBC # FLD: 19.3 % — HIGH (ref 10.3–14.5)
SODIUM SERPL-SCNC: 144 MMOL/L — SIGNIFICANT CHANGE UP (ref 135–145)
WBC # BLD: 12.41 K/UL — HIGH (ref 3.8–10.5)
WBC # FLD AUTO: 12.41 K/UL — HIGH (ref 3.8–10.5)

## 2020-06-20 PROCEDURE — 99291 CRITICAL CARE FIRST HOUR: CPT

## 2020-06-20 RX ADMIN — Medication 1 PACKET(S): at 05:12

## 2020-06-20 RX ADMIN — HUMAN INSULIN 18 UNIT(S): 100 INJECTION, SUSPENSION SUBCUTANEOUS at 23:48

## 2020-06-20 RX ADMIN — SCOPALAMINE 1 PATCH: 1 PATCH, EXTENDED RELEASE TRANSDERMAL at 23:52

## 2020-06-20 RX ADMIN — HUMAN INSULIN 18 UNIT(S): 100 INJECTION, SUSPENSION SUBCUTANEOUS at 00:22

## 2020-06-20 RX ADMIN — Medication 1 PACKET(S): at 16:25

## 2020-06-20 RX ADMIN — Medication 15 MILLIGRAM(S): at 05:12

## 2020-06-20 RX ADMIN — HUMAN INSULIN 14 UNIT(S): 100 INJECTION, SUSPENSION SUBCUTANEOUS at 17:51

## 2020-06-20 RX ADMIN — Medication 150 MICROGRAM(S): at 05:12

## 2020-06-20 RX ADMIN — LEVETIRACETAM 1000 MILLIGRAM(S): 250 TABLET, FILM COATED ORAL at 18:00

## 2020-06-20 RX ADMIN — Medication 6: at 12:21

## 2020-06-20 RX ADMIN — Medication 25 MILLIGRAM(S): at 05:12

## 2020-06-20 RX ADMIN — Medication 5 MILLIGRAM(S): at 21:19

## 2020-06-20 RX ADMIN — Medication 4: at 05:11

## 2020-06-20 RX ADMIN — HUMAN INSULIN 46 UNIT(S): 100 INJECTION, SUSPENSION SUBCUTANEOUS at 05:12

## 2020-06-20 RX ADMIN — PANTOPRAZOLE SODIUM 40 MILLIGRAM(S): 20 TABLET, DELAYED RELEASE ORAL at 10:28

## 2020-06-20 RX ADMIN — CHLORHEXIDINE GLUCONATE 1 APPLICATION(S): 213 SOLUTION TOPICAL at 21:18

## 2020-06-20 RX ADMIN — ENOXAPARIN SODIUM 30 MILLIGRAM(S): 100 INJECTION SUBCUTANEOUS at 18:00

## 2020-06-20 RX ADMIN — Medication 650 MILLIGRAM(S): at 09:07

## 2020-06-20 RX ADMIN — Medication 25 MILLIGRAM(S): at 21:19

## 2020-06-20 RX ADMIN — Medication 2 MILLIGRAM(S): at 21:19

## 2020-06-20 RX ADMIN — SIMVASTATIN 20 MILLIGRAM(S): 20 TABLET, FILM COATED ORAL at 21:19

## 2020-06-20 RX ADMIN — Medication 25 MILLIGRAM(S): at 12:20

## 2020-06-20 RX ADMIN — SCOPALAMINE 1 PATCH: 1 PATCH, EXTENDED RELEASE TRANSDERMAL at 18:00

## 2020-06-20 RX ADMIN — LEVETIRACETAM 1000 MILLIGRAM(S): 250 TABLET, FILM COATED ORAL at 05:12

## 2020-06-20 RX ADMIN — LACOSAMIDE 200 MILLIGRAM(S): 50 TABLET ORAL at 17:59

## 2020-06-20 RX ADMIN — OXYCODONE HYDROCHLORIDE 5 MILLIGRAM(S): 5 TABLET ORAL at 11:08

## 2020-06-20 RX ADMIN — ENOXAPARIN SODIUM 30 MILLIGRAM(S): 100 INJECTION SUBCUTANEOUS at 05:11

## 2020-06-20 RX ADMIN — Medication 1 PACKET(S): at 21:19

## 2020-06-20 RX ADMIN — CHLORHEXIDINE GLUCONATE 15 MILLILITER(S): 213 SOLUTION TOPICAL at 17:45

## 2020-06-20 RX ADMIN — Medication 0: at 00:22

## 2020-06-20 RX ADMIN — HUMAN INSULIN 35 UNIT(S): 100 INJECTION, SUSPENSION SUBCUTANEOUS at 12:24

## 2020-06-20 RX ADMIN — SCOPALAMINE 1 PATCH: 1 PATCH, EXTENDED RELEASE TRANSDERMAL at 07:40

## 2020-06-20 RX ADMIN — AMLODIPINE BESYLATE 10 MILLIGRAM(S): 2.5 TABLET ORAL at 05:11

## 2020-06-20 RX ADMIN — CHLORHEXIDINE GLUCONATE 15 MILLILITER(S): 213 SOLUTION TOPICAL at 05:11

## 2020-06-20 RX ADMIN — LACOSAMIDE 200 MILLIGRAM(S): 50 TABLET ORAL at 05:12

## 2020-06-20 NOTE — PROGRESS NOTE ADULT - SUBJECTIVE AND OBJECTIVE BOX
NSCU ATTENDING -- ADDITIONAL PROGRESS NOTE    Nighttime rounds were performed -- please refer to earlier Progress Note for HPI details.    T(C): 37.2 (06-20-20 @ 16:00), Max: 37.2 (06-20-20 @ 04:00)  HR: 103 (06-20-20 @ 20:09) (94 - 117)  BP: 128/74 (06-20-20 @ 19:00) (101/72 - 153/77)  RR: 25 (06-20-20 @ 19:00) (15 - 30)  SpO2: 100% (06-20-20 @ 20:09) (97% - 100%)  Wt(kg): --    Relevant labwork and imaging reviewed.    Still pending family decision-making.

## 2020-06-20 NOTE — PROGRESS NOTE ADULT - ASSESSMENT
ASSESSMENT:  Left frontal intracerebral hemorrhage, generalized periodic discharges, UTI.       NEURO:  Neuro q4h checks  seizure management: Keppra and Vimpat  Pain management with Tylenol & fentanyl prn  Given previous psych history and workup not revealing any cause, NMDA encephalitis testing sent -> NMDA AB neg.    Trach and PEG being discussed with family. no decision yet   Activity: [] OOB as tolerated [] Bedrest [X] PT [X] OT [] PMNR      PULM:  Intubated since 5/29- Day 20 of intubation.  H/O of COPD   CPAP as tolerated. Has failed CPAP before   A lot of oral secretions- added scopolamine patch       CV:  Keep -160  TTE negative for PFO  HTN: Continue norvasc   sinus tachycardia, metoprolol 25 q8h  Statin for HLD        RENAL:  IVL. Monitor Is/Os  Female iraheta  on cardura for urinary retention      GI:  Diet: On Glucerna OGT feeds  Continue Banana flakes and metamucil , LBM 6/17   GI prophylaxis [] not indicated [X] PO PPI [] other:        ENDO: S/P hyperglycemia - IR DM  Endo Following for adjustment of NPH; will follow up on sugar levels  Continue PO synthroid  Continue prednisone 15mg, 5 mg at bedtime (was on it at home for RA). Likely etiology of bruising  Goal euglycemia (-180)      HEME/ONC:  On SQL for DVT ppx- 30mg SQ q12.  VTE prophylaxis: [X] SCDs [X] chemoprophylaxis [X] high risk of DVT/PE on admission due to: bed bound at baseline  LE doppler 5/30- No DVT        ID:  Afebrile   Ertapnem for ESBL found growing in urine culture on 6/4 - ended on  - 6/14      MISC: Family meeting with palliative/ethics/icu on 6/16. Family understands all options are bad--trach/PEG vs. compassionate extubation. No decisions were made despite palliative care follow up after the meeting. Ethics spoke with Heavenly on 6/18--see documentation. No decisions made.       SOCIAL/FAMILY:  [X] updated family via phone  [] family meeting    CODE STATUS:  [X] Full Code     DISPOSITION:  [X] ICU    [X] Patient is at high risk of neurologic deterioration/death due to: resp failure, seizures, hemorrhage. ASSESSMENT:  Left frontal intracerebral hemorrhage, generalized periodic discharges, UTI.       NEURO:  Neuro q4h checks  seizure management: Keppra and Vimpat  Pain management with Tylenol & fentanyl prn  Given previous psych history and workup not revealing any cause, NMDA encephalitis testing sent -> NMDA AB neg.    Trach and PEG being discussed with family. no decision yet   Activity: [] OOB as tolerated [] Bedrest [X] PT [X] OT [] PMNR      PULM:  Intubated since 5/29- Day 20 of intubation.- awaiting family decision on goals of care - not likely to withstand extubation.   H/O of COPD   CPAP as tolerated. Has failed CPAP before   A lot of oral secretions- added scopolamine patch       CV:  Keep -160  TTE negative for PFO  HTN: Continue norvasc   sinus tachycardia, metoprolol 25 q8h  Statin for HLD        RENAL:  IVL. Monitor Is/Os  Female iraheta  on cardura for urinary retention      GI:  Diet: On Glucerna OGT feeds  Continue Banana flakes and metamucil , LBM 6/17   GI prophylaxis [] not indicated [X] PO PPI [] other:        ENDO: S/P hyperglycemia - IR DM  Endo Following for adjustment of NPH; will follow up on sugar levels  Continue PO synthroid  Continue prednisone 15mg, 5 mg at bedtime (was on it at home for RA). Likely etiology of bruising  Goal euglycemia (-180)      HEME/ONC:  On SQL for DVT ppx- 30mg SQ q12.  VTE prophylaxis: [X] SCDs [X] chemoprophylaxis [X] high risk of DVT/PE on admission due to: bed bound at baseline  LE doppler 5/30- No DVT        ID:  Afebrile   Ertapnem for ESBL found growing in urine culture on 6/4 - ended on  - 6/14      MISC: Family meeting with palliative/ethics/icu on 6/16. Family understands all options are bad--trach/PEG vs. compassionate extubation. No decisions were made despite palliative care follow up after the meeting. Ethics spoke with Heavenly on 6/18--see documentation. No decisions made.       SOCIAL/FAMILY:  [X] updated family via phone  [] family meeting    CODE STATUS:  [X] Full Code     DISPOSITION:  [X] ICU    [X] Patient is at high risk of neurologic deterioration/death due to: resp failure, seizures, hemorrhage.

## 2020-06-20 NOTE — PROGRESS NOTE ADULT - SUBJECTIVE AND OBJECTIVE BOX
65 yo F with AMS since this morning.  Pt. was confused, didn't recognize son this morning.  He notes she was sleeping more than usual yesterday, which tipped off son that something was wrong.  At 11 am pt. was gasping for air like she was seizing. 2 hours ago pt. was dry heaving, her chest was going up and down and she started shaking like she was having a seizure.  Pt. has no history of seizures per son.  		PMH: hypothyroidism, HTN, IDDM, COPD/CHRIS on home O2, RA (on Prednisone), SBO s/p ex lap with lysis of adhesions c/b evisceration,  recent hospitalization in 3/2020 with encephalopathy, significantly hypothyroid (, without myxedema coma), small (stable) right sided SDH, UTI, catatonic reaction to VPA (now resolved) and DKA and psychiatric issues, 5/2020 hospitalization for GIB, Emphysematous cystitis, endometritis, Sigmoid colitis, morbid obesity, functional quadriplegia    HCP: Norris Rush, Son [phone]; Daughter, Heavenly Schmitd [phone]; son asks that only him or his sister, Heavenly be contacted for patient updates/information as they are both proxy's for patient (29 May 2020 23:41)    OVERNIGHT EVENTS: none    ICU Vital Signs Last 24 Hrs  T(C): 37.2 (20 Jun 2020 04:00), Max: 37.7 (19 Jun 2020 16:00)  T(F): 99 (20 Jun 2020 04:00), Max: 99.9 (19 Jun 2020 16:00)  HR: 94 (20 Jun 2020 06:00) (94 - 114)  BP: 114/75 (20 Jun 2020 06:00) (104/51 - 133/57)  BP(mean): 88 (20 Jun 2020 06:00) (66 - 88)  ABP: --  ABP(mean): --  RR: 15 (20 Jun 2020 06:00) (15 - 28)  SpO2: 100% (20 Jun 2020 06:00) (99% - 100%)          Mode: AC/ CMV (Assist Control/ Continuous Mandatory Ventilation), RR (machine): 14, TV (machine): 450, FiO2: 40, PEEP: 5, ITime: 1, MAP: 10, PIP: 20    acetaminophen    Suspension .. 650 milliGRAM(s) Oral every 6 hours PRN  amLODIPine   Tablet 10 milliGRAM(s) Oral daily  doxazosin 2 milliGRAM(s) Oral at bedtime  enoxaparin Injectable 30 milliGRAM(s) SubCutaneous two times a day  fentaNYL    Injectable 25 MICROGram(s) IV Push every 4 hours PRN  insulin lispro (HumaLOG) corrective regimen sliding scale   SubCutaneous every 6 hours  insulin NPH human recombinant 35 Unit(s) SubCutaneous <User Schedule>  insulin NPH human recombinant 46 Unit(s) SubCutaneous <User Schedule>  insulin NPH human recombinant 18 Unit(s) SubCutaneous <User Schedule>  insulin NPH human recombinant 14 Unit(s) SubCutaneous <User Schedule>  lacosamide Solution 200 milliGRAM(s) Oral two times a day  levETIRAcetam  Solution 1000 milliGRAM(s) Oral two times a day  levothyroxine 150 MICROGram(s) Oral daily  metoprolol tartrate 25 milliGRAM(s) Oral every 8 hours  pantoprazole   Suspension 40 milliGRAM(s) Oral daily  predniSONE   Tablet 15 milliGRAM(s) Oral <User Schedule>  predniSONE   Tablet 5 milliGRAM(s) Oral at bedtime  psyllium Powder 1 Packet(s) Oral three times a day  scopolamine 1 mG/72 Hr(s) Patch 1 Patch Transdermal every 72 hours  simvastatin 20 milliGRAM(s) Oral at bedtime  sodium chloride 0.9% lock flush 10 milliLiter(s) IV Push every 1 hour PRN      LABS:                          8.3    12.41 )-----------( 380      ( 19 Jun 2020 23:54 )             27.8   06-19    144  |  106  |  19  ----------------------------<  147<H>  4.2   |  27  |  0.62    Ca    9.0      19 Jun 2020 23:54  Phos  3.3     06-19  Mg     2.2     06-19             PHYSICAL EXAM:  Neurological:  intubated, EO spont, does not track, disconjugated gaze, pupils 3mm reactive, +cough/gag, not follow commands, upper extremities tremors when stimulated, LE TF  Cardiovascular: +s1, s2  Respiratory: clear to auscultation b/l  Gastrointestinal: soft, non-distended, non-tender  abd: soft, non-tender, nl bs ,+ skin lesion  Extremities: Warm, dry  + skin tear on buttocks    DIET: Glucerna @65    IMAGING: no new imaging 65 yo F with AMS since this morning.  Pt. was confused, didn't recognize son this morning.  He notes she was sleeping more than usual yesterday, which tipped off son that something was wrong.  At 11 am pt. was gasping for air like she was seizing. 2 hours ago pt. was dry heaving, her chest was going up and down and she started shaking like she was having a seizure.  Pt. has no history of seizures per son.  		PMH: hypothyroidism, HTN, IDDM, COPD/CHRIS on home O2, RA (on Prednisone), SBO s/p ex lap with lysis of adhesions c/b evisceration,  recent hospitalization in 3/2020 with encephalopathy, significantly hypothyroid (, without myxedema coma), small (stable) right sided SDH, UTI, catatonic reaction to VPA (now resolved) and DKA and psychiatric issues, 5/2020 hospitalization for GIB, Emphysematous cystitis, endometritis, Sigmoid colitis, morbid obesity, functional quadriplegia    HCP: Norris Rush, Son [phone]; Daughter, Heavenly Schmidt [phone]; son asks that only him or his sister, Heavenly be contacted for patient updates/information as they are both proxy's for patient (29 May 2020 23:41)    OVERNIGHT EVENTS: none    ICU Vital Signs Last 24 Hrs  T(C): 37.2 (20 Jun 2020 04:00), Max: 37.7 (19 Jun 2020 16:00)  T(F): 99 (20 Jun 2020 04:00), Max: 99.9 (19 Jun 2020 16:00)  HR: 94 (20 Jun 2020 06:00) (94 - 114)  BP: 114/75 (20 Jun 2020 06:00) (104/51 - 133/57)  BP(mean): 88 (20 Jun 2020 06:00) (66 - 88)  RR: 15 (20 Jun 2020 06:00) (15 - 28)  SpO2: 100% (20 Jun 2020 06:00) (99% - 100%)          Mode: AC/ CMV (Assist Control/ Continuous Mandatory Ventilation), RR (machine): 14, TV (machine): 450, FiO2: 40, PEEP: 5, ITime: 1, MAP: 10, PIP: 20    acetaminophen    Suspension .. 650 milliGRAM(s) Oral every 6 hours PRN  amLODIPine   Tablet 10 milliGRAM(s) Oral daily  doxazosin 2 milliGRAM(s) Oral at bedtime  enoxaparin Injectable 30 milliGRAM(s) SubCutaneous two times a day  fentaNYL    Injectable 25 MICROGram(s) IV Push every 4 hours PRN  insulin lispro (HumaLOG) corrective regimen sliding scale   SubCutaneous every 6 hours  insulin NPH human recombinant 35 Unit(s) SubCutaneous <User Schedule>  insulin NPH human recombinant 46 Unit(s) SubCutaneous <User Schedule>  insulin NPH human recombinant 18 Unit(s) SubCutaneous <User Schedule>  insulin NPH human recombinant 14 Unit(s) SubCutaneous <User Schedule>  lacosamide Solution 200 milliGRAM(s) Oral two times a day  levETIRAcetam  Solution 1000 milliGRAM(s) Oral two times a day  levothyroxine 150 MICROGram(s) Oral daily  metoprolol tartrate 25 milliGRAM(s) Oral every 8 hours  pantoprazole   Suspension 40 milliGRAM(s) Oral daily  predniSONE   Tablet 15 milliGRAM(s) Oral <User Schedule>  predniSONE   Tablet 5 milliGRAM(s) Oral at bedtime  psyllium Powder 1 Packet(s) Oral three times a day  scopolamine 1 mG/72 Hr(s) Patch 1 Patch Transdermal every 72 hours  simvastatin 20 milliGRAM(s) Oral at bedtime  sodium chloride 0.9% lock flush 10 milliLiter(s) IV Push every 1 hour PRN      LABS:                          8.3    12.41 )-----------( 380      ( 19 Jun 2020 23:54 )             27.8   06-19    144  |  106  |  19  ----------------------------<  147<H>  4.2   |  27  |  0.62    Ca    9.0      19 Jun 2020 23:54  Phos  3.3     06-19  Mg     2.2     06-19             PHYSICAL EXAM:  Neurological:  intubated, EO spont, does not track, disconjugated gaze, pupils 3mm reactive, +cough/gag, not follow commands, upper extremities tremors when stimulated, LE TF  Cardiovascular: +s1, s2  Respiratory: clear to auscultation b/l  Gastrointestinal: soft, non-distended, non-tender  abd: soft, non-tender, nl bs ,+ skin lesion  Extremities: Warm, dry  + skin tear on buttocks    DIET: Glucerna @65    IMAGING: no new imaging

## 2020-06-21 LAB
GLUCOSE BLDC GLUCOMTR-MCNC: 101 MG/DL — HIGH (ref 70–99)
GLUCOSE BLDC GLUCOMTR-MCNC: 138 MG/DL — HIGH (ref 70–99)
GLUCOSE BLDC GLUCOMTR-MCNC: 143 MG/DL — HIGH (ref 70–99)
GLUCOSE BLDC GLUCOMTR-MCNC: 82 MG/DL — SIGNIFICANT CHANGE UP (ref 70–99)

## 2020-06-21 PROCEDURE — 99292 CRITICAL CARE ADDL 30 MIN: CPT

## 2020-06-21 PROCEDURE — 71045 X-RAY EXAM CHEST 1 VIEW: CPT | Mod: 26

## 2020-06-21 PROCEDURE — 99291 CRITICAL CARE FIRST HOUR: CPT

## 2020-06-21 RX ORDER — HUMAN INSULIN 100 [IU]/ML
9 INJECTION, SUSPENSION SUBCUTANEOUS ONCE
Refills: 0 | Status: DISCONTINUED | OUTPATIENT
Start: 2020-06-21 | End: 2020-06-21

## 2020-06-21 RX ORDER — HUMAN INSULIN 100 [IU]/ML
9 INJECTION, SUSPENSION SUBCUTANEOUS ONCE
Refills: 0 | Status: COMPLETED | OUTPATIENT
Start: 2020-06-21 | End: 2020-06-21

## 2020-06-21 RX ADMIN — SCOPALAMINE 1 PATCH: 1 PATCH, EXTENDED RELEASE TRANSDERMAL at 05:37

## 2020-06-21 RX ADMIN — Medication 25 MILLIGRAM(S): at 05:36

## 2020-06-21 RX ADMIN — HUMAN INSULIN 9 UNIT(S): 100 INJECTION, SUSPENSION SUBCUTANEOUS at 23:18

## 2020-06-21 RX ADMIN — SCOPALAMINE 1 PATCH: 1 PATCH, EXTENDED RELEASE TRANSDERMAL at 20:52

## 2020-06-21 RX ADMIN — Medication 2 MILLIGRAM(S): at 21:08

## 2020-06-21 RX ADMIN — HUMAN INSULIN 46 UNIT(S): 100 INJECTION, SUSPENSION SUBCUTANEOUS at 05:36

## 2020-06-21 RX ADMIN — HUMAN INSULIN 14 UNIT(S): 100 INJECTION, SUSPENSION SUBCUTANEOUS at 17:09

## 2020-06-21 RX ADMIN — LEVETIRACETAM 1000 MILLIGRAM(S): 250 TABLET, FILM COATED ORAL at 05:36

## 2020-06-21 RX ADMIN — ENOXAPARIN SODIUM 30 MILLIGRAM(S): 100 INJECTION SUBCUTANEOUS at 05:36

## 2020-06-21 RX ADMIN — Medication 25 MILLIGRAM(S): at 14:48

## 2020-06-21 RX ADMIN — SCOPALAMINE 1 PATCH: 1 PATCH, EXTENDED RELEASE TRANSDERMAL at 00:14

## 2020-06-21 RX ADMIN — Medication 5 MILLIGRAM(S): at 21:08

## 2020-06-21 RX ADMIN — SIMVASTATIN 20 MILLIGRAM(S): 20 TABLET, FILM COATED ORAL at 21:08

## 2020-06-21 RX ADMIN — Medication 25 MILLIGRAM(S): at 21:08

## 2020-06-21 RX ADMIN — HUMAN INSULIN 35 UNIT(S): 100 INJECTION, SUSPENSION SUBCUTANEOUS at 11:57

## 2020-06-21 RX ADMIN — Medication 1 PACKET(S): at 05:37

## 2020-06-21 RX ADMIN — Medication 150 MICROGRAM(S): at 05:36

## 2020-06-21 RX ADMIN — Medication 15 MILLIGRAM(S): at 05:37

## 2020-06-21 RX ADMIN — LACOSAMIDE 200 MILLIGRAM(S): 50 TABLET ORAL at 05:36

## 2020-06-21 RX ADMIN — PANTOPRAZOLE SODIUM 40 MILLIGRAM(S): 20 TABLET, DELAYED RELEASE ORAL at 11:58

## 2020-06-21 RX ADMIN — LEVETIRACETAM 1000 MILLIGRAM(S): 250 TABLET, FILM COATED ORAL at 17:10

## 2020-06-21 RX ADMIN — CHLORHEXIDINE GLUCONATE 15 MILLILITER(S): 213 SOLUTION TOPICAL at 17:09

## 2020-06-21 RX ADMIN — AMLODIPINE BESYLATE 10 MILLIGRAM(S): 2.5 TABLET ORAL at 05:36

## 2020-06-21 RX ADMIN — LACOSAMIDE 200 MILLIGRAM(S): 50 TABLET ORAL at 17:09

## 2020-06-21 RX ADMIN — CHLORHEXIDINE GLUCONATE 15 MILLILITER(S): 213 SOLUTION TOPICAL at 04:59

## 2020-06-21 RX ADMIN — CHLORHEXIDINE GLUCONATE 1 APPLICATION(S): 213 SOLUTION TOPICAL at 21:25

## 2020-06-21 RX ADMIN — ENOXAPARIN SODIUM 30 MILLIGRAM(S): 100 INJECTION SUBCUTANEOUS at 17:09

## 2020-06-21 NOTE — PROGRESS NOTE ADULT - ASSESSMENT
ASSESSMENT:  Left frontal intracerebral hemorrhage, generalized periodic discharges, UTI.       NEURO:  Neuro q4h checks  seizure management: Keppra and Vimpat  Pain management with Tylenol & fentanyl prn  Given previous psych history and workup not revealing any cause, NMDA encephalitis testing sent -> NMDA AB neg.    Trach and PEG being discussed with family. no decision yet   Activity: [] OOB as tolerated [] Bedrest [X] PT [X] OT [] PMNR      PULM:  Intubated since 5/29- Day 23 of intubation.- awaiting family decision on goals of care - not likely to withstand extubation.   H/O of COPD   CPAP as tolerated. Has failed CPAP before   A lot of oral secretions- added scopolamine patch       CV:  Keep -160  TTE negative for PFO  HTN: Continue norvasc   sinus tachycardia, metoprolol 25 q8h  Statin for HLD        RENAL:  IVL. Monitor Is/Os  Female iraheta  on cardura for urinary retention      GI:  Diet: On Glucerna OGT feeds  Continue Banana flakes and metamucil , LBM 6/17   GI prophylaxis [] not indicated [X] PO PPI [] other:        ENDO: S/P hyperglycemia - IR DM  Endo Following for adjustment of NPH; will follow up on sugar levels  Continue PO synthroid  Continue prednisone 15mg, 5 mg at bedtime (was on it at home for RA). Likely etiology of bruising  Goal euglycemia (-180)      HEME/ONC:  On SQL for DVT ppx- 30mg SQ q12.  VTE prophylaxis: [X] SCDs [X] chemoprophylaxis [X] high risk of DVT/PE on admission due to: bed bound at baseline  LE doppler 5/30- No DVT        ID:  Afebrile   Ertapnem for ESBL found growing in urine culture on 6/4 - ended on  - 6/14      MISC: Family meeting with palliative/ethics/icu on 6/16. Family understands all options are bad--trach/PEG vs. compassionate extubation. No decisions were made despite palliative care follow up after the meeting. Ethics spoke with Heavenly on 6/18--see documentation. No decisions made.       SOCIAL/FAMILY:  [X] updated family via phone  [] family meeting    CODE STATUS:  [X] Full Code     DISPOSITION:  [X] ICU    [X] Patient is at high risk of neurologic deterioration/death due to: resp failure, seizures, hemorrhage. ASSESSMENT:  Left frontal intracerebral hemorrhage, generalized periodic discharges, UTI.       NEURO:  Neuro q4h checks  seizure management: Keppra and Vimpat  Pain management with Tylenol & fentanyl prn  Given previous psych history and workup not revealing any cause, NMDA encephalitis testing sent -> NMDA AB neg.    Trach and PEG being discussed with family. no decision yet   Activity: [] OOB as tolerated [] Bedrest [X] PT [X] OT [] PMNR      PULM:  Intubated since 5/29- Day 23 of intubation.- awaiting family decision on goals of care - not likely to withstand extubation.   H/O of COPD   CPAP as tolerated. Has failed CPAP before and did not tolerate CPAP 5/5 on 5/10 now  A lot of oral secretions- added scopolamine patch       CV:  Keep -160  TTE negative for PFO  HTN: Continue norvasc   sinus tachycardia, metoprolol 25 q8h  Statin for HLD        RENAL:  IVL. Monitor Is/Os  Staright cath q6   on cardura for urinary retention      GI:  Diet: On Glucerna OGT feeds  D/C Banana flakes and metamucil , LBM 6/19;   GI prophylaxis [] not indicated [X] PO PPI [] other:        ENDO: S/P hyperglycemia - IR DM  Endo Following for adjustment of NPH; will follow up on sugar levels  Continue PO synthroid  Continue prednisone 15mg, 5 mg at bedtime (was on it at home for RA). Likely etiology of bruising  Goal euglycemia (-180)      HEME/ONC:  On SQL for DVT ppx- 30mg SQ q12.  VTE prophylaxis: [X] SCDs [X] chemoprophylaxis [X] high risk of DVT/PE on admission due to: bed bound at baseline  LE doppler 5/30- No DVT        ID:  Afebrile   Ertapnem for ESBL found growing in urine culture on 6/4 - ended on  - 6/14      MISC: Family meeting with palliative/ethics/icu on 6/16. Family understands all options are bad--trach/PEG vs. compassionate extubation. No decisions were made despite palliative care follow up after the meeting. Ethics spoke with Heavenly on 6/18--see documentation. No decisions made.       SOCIAL/FAMILY:  [X] updated family via phone  [] family meeting    CODE STATUS:  [X] Full Code     DISPOSITION:  [X] ICU    [X] Patient is at high risk of neurologic deterioration/death due to: resp failure, seizures, hemorrhage.

## 2020-06-21 NOTE — PROGRESS NOTE ADULT - SUBJECTIVE AND OBJECTIVE BOX
SUMMARY:    Daytime Events: None significant    T(C): 37.7 (06-21-20 @ 19:00), Max: 37.7 (06-21-20 @ 19:00)  HR: 97 (06-21-20 @ 21:48) (88 - 114)  BP: 144/108 (06-21-20 @ 21:48) (105/50 - 160/98)  RR: 24 (06-21-20 @ 21:48) (14 - 29)  SpO2: 100% (06-21-20 @ 21:48) (98% - 100%)  Wt(kg): --    Physical Exam:  EXAMINATION:  Intubated; eyes open to noxious; pupils 3mm b/l  no FC; overbreathes vent  +Cough/gag  LE triple flexion    IMAGING/DATA: [] Reviewed    acetaminophen    Suspension .. 650 milliGRAM(s) Oral every 6 hours PRN  amLODIPine   Tablet 10 milliGRAM(s) Oral daily  chlorhexidine 0.12% Liquid 15 milliLiter(s) Oral Mucosa every 12 hours  chlorhexidine 4% Liquid 1 Application(s) Topical <User Schedule>  dextrose 5%. 1000 milliLiter(s) IV Continuous <Continuous>  dextrose 50% Injectable 25 Gram(s) IV Push once  doxazosin 2 milliGRAM(s) Oral at bedtime  enoxaparin Injectable 30 milliGRAM(s) SubCutaneous two times a day  glycopyrrolate Injectable 0.1 milliGRAM(s) IV Push three times a day PRN  insulin lispro (HumaLOG) corrective regimen sliding scale   SubCutaneous every 6 hours  insulin NPH human recombinant 18 Unit(s) SubCutaneous <User Schedule>  insulin NPH human recombinant 14 Unit(s) SubCutaneous <User Schedule>  insulin NPH human recombinant 35 Unit(s) SubCutaneous <User Schedule>  insulin NPH human recombinant 46 Unit(s) SubCutaneous <User Schedule>  lacosamide Solution 200 milliGRAM(s) Oral two times a day  levETIRAcetam  Solution 1000 milliGRAM(s) Oral two times a day  levothyroxine 150 MICROGram(s) Oral daily  metoprolol tartrate 25 milliGRAM(s) Oral every 8 hours  oxyCODONE    IR 5 milliGRAM(s) Oral every 6 hours PRN  oxyCODONE    IR 10 milliGRAM(s) Oral every 6 hours PRN  pantoprazole   Suspension 40 milliGRAM(s) Oral daily  predniSONE   Tablet 15 milliGRAM(s) Oral <User Schedule>  predniSONE   Tablet 5 milliGRAM(s) Oral at bedtime  scopolamine 1 mG/72 Hr(s) Patch 1 Patch Transdermal every 72 hours  simvastatin 20 milliGRAM(s) Oral at bedtime  sodium chloride 0.9% lock flush 10 milliLiter(s) IV Push every 1 hour PRN                            8.3    12.41 )-----------( 380      ( 19 Jun 2020 23:54 )             27.8     06-19    144  |  106  |  19  ----------------------------<  147<H>  4.2   |  27  |  0.62    Ca    9.0      19 Jun 2020 23:54  Phos  3.3     06-19  Mg     2.2     06-19        DEVICES:   [] Restraints [] ET tube [] central line [] arterial line [] iraheta [] NGT/OGT [] EVD [] LD [] MABEL/HMV [] Trach [] PEG [] Chest Tube [] other:

## 2020-06-21 NOTE — PROGRESS NOTE ADULT - SUBJECTIVE AND OBJECTIVE BOX
63 yo F with AMS since this morning.  Pt. was confused, didn't recognize son this morning.  He notes she was sleeping more than usual yesterday, which tipped off son that something was wrong.  At 11 am pt. was gasping for air like she was seizing. 2 hours ago pt. was dry heaving, her chest was going up and down and she started shaking like she was having a seizure.  Pt. has no history of seizures per son.  		PMH: hypothyroidism, HTN, IDDM, COPD/CHRIS on home O2, RA (on Prednisone), SBO s/p ex lap with lysis of adhesions c/b evisceration,  recent hospitalization in 3/2020 with encephalopathy, significantly hypothyroid (, without myxedema coma), small (stable) right sided SDH, UTI, catatonic reaction to VPA (now resolved) and DKA and psychiatric issues, 5/2020 hospitalization for GIB, Emphysematous cystitis, endometritis, Sigmoid colitis, morbid obesity, functional quadriplegia    HCP: Norris Rush, Son [phone]; Daughter, Heavenly Schmidt [phone]; son asks that only him or his sister, Heavenly be contacted for patient updates/information as they are both proxy's for patient (29 May 2020 23:41)    OVERNIGHT EVENTS: none    ICU Vital Signs Last 24 Hrs  T(C): 37.2 (20 Jun 2020 16:00), Max: 37.2 (20 Jun 2020 08:00)  T(F): 99 (20 Jun 2020 16:00), Max: 99 (20 Jun 2020 16:00)  HR: 90 (21 Jun 2020 06:00) (90 - 117)  BP: 114/65 (21 Jun 2020 06:00) (94/67 - 153/77)  BP(mean): 80 (21 Jun 2020 06:00) (71 - 109)  ABP: --  ABP(mean): --  RR: 15 (21 Jun 2020 06:00) (15 - 30)  SpO2: 100% (21 Jun 2020 06:00) (98% - 100%)      Mode: AC/ CMV (Assist Control/ Continuous Mandatory Ventilation), RR (machine): 14, TV (machine): 450, FiO2: 40, PEEP: 5, ITime: 1, MAP: 10, PIP: 20    MEDICATIONS  (STANDING):  amLODIPine   Tablet 10 milliGRAM(s) Oral daily  chlorhexidine 0.12% Liquid 15 milliLiter(s) Oral Mucosa every 12 hours  chlorhexidine 4% Liquid 1 Application(s) Topical <User Schedule>  dextrose 5%. 1000 milliLiter(s) (50 mL/Hr) IV Continuous <Continuous>  dextrose 50% Injectable 25 Gram(s) IV Push once  doxazosin 2 milliGRAM(s) Oral at bedtime  enoxaparin Injectable 30 milliGRAM(s) SubCutaneous two times a day  insulin lispro (HumaLOG) corrective regimen sliding scale   SubCutaneous every 6 hours  insulin NPH human recombinant 18 Unit(s) SubCutaneous <User Schedule>  insulin NPH human recombinant 14 Unit(s) SubCutaneous <User Schedule>  insulin NPH human recombinant 35 Unit(s) SubCutaneous <User Schedule>  insulin NPH human recombinant 46 Unit(s) SubCutaneous <User Schedule>  lacosamide Solution 200 milliGRAM(s) Oral two times a day  levETIRAcetam  Solution 1000 milliGRAM(s) Oral two times a day  levothyroxine 150 MICROGram(s) Oral daily  metoprolol tartrate 25 milliGRAM(s) Oral every 8 hours  pantoprazole   Suspension 40 milliGRAM(s) Oral daily  predniSONE   Tablet 15 milliGRAM(s) Oral <User Schedule>  predniSONE   Tablet 5 milliGRAM(s) Oral at bedtime  psyllium Powder 1 Packet(s) Oral three times a day  scopolamine 1 mG/72 Hr(s) Patch 1 Patch Transdermal every 72 hours  simvastatin 20 milliGRAM(s) Oral at bedtime    MEDICATIONS  (PRN):  acetaminophen    Suspension .. 650 milliGRAM(s) Oral every 6 hours PRN Temp greater or equal to 38C (100.4F), Mild Pain (1 - 3)  glycopyrrolate Injectable 0.1 milliGRAM(s) IV Push three times a day PRN secretions  oxyCODONE    IR 5 milliGRAM(s) Oral every 6 hours PRN Moderate Pain (4 - 6)  oxyCODONE    IR 10 milliGRAM(s) Oral every 6 hours PRN Severe Pain (7 - 10)  sodium chloride 0.9% lock flush 10 milliLiter(s) IV Push every 1 hour PRN Pre/post blood products, medications, blood draw, and to maintain line patency        LABS:                          8.3    12.41 )-----------( 380      ( 19 Jun 2020 23:54 )             27.8   06-19    144  |  106  |  19  ----------------------------<  147<H>  4.2   |  27  |  0.62    Ca    9.0      19 Jun 2020 23:54  Phos  3.3     06-19  Mg     2.2     06-19             PHYSICAL EXAM:  Neurological:  intubated, EO spont, does not track, disconjugated gaze, pupils 3mm reactive, +cough/gag, does not follow commands, upper extremities tremors when stimulated, LE TF  Cardiovascular: +s1, s2  Respiratory: clear to auscultation b/l  Gastrointestinal: soft, non-distended, non-tender  abd: soft, non-tender, nl bs ,+ skin lesion  Extremities: Warm, dry  + skin tear on buttocks    DIET: Glucerna @65    IMAGING: no new imaging 63 yo F with AMS since this morning.  Pt. was confused, didn't recognize son this morning.  He notes she was sleeping more than usual yesterday, which tipped off son that something was wrong.  At 11 am pt. was gasping for air like she was seizing. 2 hours ago pt. was dry heaving, her chest was going up and down and she started shaking like she was having a seizure.  Pt. has no history of seizures per son.  		PMH: hypothyroidism, HTN, IDDM, COPD/CHRIS on home O2, RA (on Prednisone), SBO s/p ex lap with lysis of adhesions c/b evisceration,  recent hospitalization in 3/2020 with encephalopathy, significantly hypothyroid (, without myxedema coma), small (stable) right sided SDH, UTI, catatonic reaction to VPA (now resolved) and DKA and psychiatric issues, 5/2020 hospitalization for GIB, Emphysematous cystitis, endometritis, Sigmoid colitis, morbid obesity, functional quadriplegia    HCP: Norris Rush, Son [phone]; Daughter, Heavenly Schmidt [phone]; son asks that only him or his sister, Heavenly be contacted for patient updates/information as they are both proxy's for patient (29 May 2020 23:41)    OVERNIGHT EVENTS: none    ICU Vital Signs Last 24 Hrs  T(C): 37.2 (20 Jun 2020 16:00), Max: 37.2 (20 Jun 2020 08:00)  T(F): 99 (20 Jun 2020 16:00), Max: 99 (20 Jun 2020 16:00)  HR: 90 (21 Jun 2020 06:00) (90 - 117)  BP: 114/65 (21 Jun 2020 06:00) (94/67 - 153/77)  BP(mean): 80 (21 Jun 2020 06:00) (71 - 109)    RR: 15 (21 Jun 2020 06:00) (15 - 30)  SpO2: 100% (21 Jun 2020 06:00) (98% - 100%)      Mode: AC/ CMV (Assist Control/ Continuous Mandatory Ventilation), RR (machine): 14, TV (machine): 450, FiO2: 40, PEEP: 5, ITime: 1, MAP: 10, PIP: 20    MEDICATIONS  (STANDING):  amLODIPine   Tablet 10 milliGRAM(s) Oral daily  chlorhexidine 0.12% Liquid 15 milliLiter(s) Oral Mucosa every 12 hours  chlorhexidine 4% Liquid 1 Application(s) Topical <User Schedule>  dextrose 5%. 1000 milliLiter(s) (50 mL/Hr) IV Continuous <Continuous>  dextrose 50% Injectable 25 Gram(s) IV Push once  doxazosin 2 milliGRAM(s) Oral at bedtime  enoxaparin Injectable 30 milliGRAM(s) SubCutaneous two times a day  insulin lispro (HumaLOG) corrective regimen sliding scale   SubCutaneous every 6 hours  insulin NPH human recombinant 18 Unit(s) SubCutaneous <User Schedule>  insulin NPH human recombinant 14 Unit(s) SubCutaneous <User Schedule>  insulin NPH human recombinant 35 Unit(s) SubCutaneous <User Schedule>  insulin NPH human recombinant 46 Unit(s) SubCutaneous <User Schedule>  lacosamide Solution 200 milliGRAM(s) Oral two times a day  levETIRAcetam  Solution 1000 milliGRAM(s) Oral two times a day  levothyroxine 150 MICROGram(s) Oral daily  metoprolol tartrate 25 milliGRAM(s) Oral every 8 hours  pantoprazole   Suspension 40 milliGRAM(s) Oral daily  predniSONE   Tablet 15 milliGRAM(s) Oral <User Schedule>  predniSONE   Tablet 5 milliGRAM(s) Oral at bedtime  psyllium Powder 1 Packet(s) Oral three times a day  scopolamine 1 mG/72 Hr(s) Patch 1 Patch Transdermal every 72 hours  simvastatin 20 milliGRAM(s) Oral at bedtime    MEDICATIONS  (PRN):  acetaminophen    Suspension .. 650 milliGRAM(s) Oral every 6 hours PRN Temp greater or equal to 38C (100.4F), Mild Pain (1 - 3)  glycopyrrolate Injectable 0.1 milliGRAM(s) IV Push three times a day PRN secretions  oxyCODONE    IR 5 milliGRAM(s) Oral every 6 hours PRN Moderate Pain (4 - 6)  oxyCODONE    IR 10 milliGRAM(s) Oral every 6 hours PRN Severe Pain (7 - 10)  sodium chloride 0.9% lock flush 10 milliLiter(s) IV Push every 1 hour PRN Pre/post blood products, medications, blood draw, and to maintain line patency        LABS:                          8.3    12.41 )-----------( 380      ( 19 Jun 2020 23:54 )             27.8   06-19    144  |  106  |  19  ----------------------------<  147<H>  4.2   |  27  |  0.62    Ca    9.0      19 Jun 2020 23:54  Phos  3.3     06-19  Mg     2.2     06-19             PHYSICAL EXAM:  Neurological:  intubated, EO spont, does not track, disconjugated gaze, pupils 3mm reactive, +cough/gag, does not follow commands, upper extremities tremors when stimulated, LE TF  Cardiovascular: +s1, s2  Respiratory: clear to auscultation b/l  Gastrointestinal: soft, non-distended, non-tender  abd: soft, non-tender, nl bs ,+ skin lesion  Extremities: Warm, dry  + skin tear on buttocks    DIET: Glucerna @65    IMAGING: no new imaging

## 2020-06-21 NOTE — PROGRESS NOTE ADULT - ASSESSMENT
ASSESSMENT/PLAN:  Pt seen and examined on evening roounds; Pt failed CPAP due to fatigue early in shift. Will cont GOC discussions with family    x] Patient is at high risk of neurologic deterioration/death due to: respiratory failure, cerebral edema, brain compresion    Time seen:  Time spent: _35__ [x] critical care minutes

## 2020-06-22 LAB
ANION GAP SERPL CALC-SCNC: 10 MMOL/L — SIGNIFICANT CHANGE UP (ref 5–17)
BUN SERPL-MCNC: 27 MG/DL — HIGH (ref 7–23)
CALCIUM SERPL-MCNC: 9 MG/DL — SIGNIFICANT CHANGE UP (ref 8.4–10.5)
CHLORIDE SERPL-SCNC: 104 MMOL/L — SIGNIFICANT CHANGE UP (ref 96–108)
CO2 SERPL-SCNC: 28 MMOL/L — SIGNIFICANT CHANGE UP (ref 22–31)
CREAT SERPL-MCNC: 0.65 MG/DL — SIGNIFICANT CHANGE UP (ref 0.5–1.3)
GLUCOSE BLDC GLUCOMTR-MCNC: 113 MG/DL — HIGH (ref 70–99)
GLUCOSE BLDC GLUCOMTR-MCNC: 118 MG/DL — HIGH (ref 70–99)
GLUCOSE BLDC GLUCOMTR-MCNC: 146 MG/DL — HIGH (ref 70–99)
GLUCOSE BLDC GLUCOMTR-MCNC: 211 MG/DL — HIGH (ref 70–99)
GLUCOSE SERPL-MCNC: 100 MG/DL — HIGH (ref 70–99)
HCT VFR BLD CALC: 26.7 % — LOW (ref 34.5–45)
HGB BLD-MCNC: 8.1 G/DL — LOW (ref 11.5–15.5)
MAGNESIUM SERPL-MCNC: 2.3 MG/DL — SIGNIFICANT CHANGE UP (ref 1.6–2.6)
MCHC RBC-ENTMCNC: 29.1 PG — SIGNIFICANT CHANGE UP (ref 27–34)
MCHC RBC-ENTMCNC: 30.3 GM/DL — LOW (ref 32–36)
MCV RBC AUTO: 96 FL — SIGNIFICANT CHANGE UP (ref 80–100)
NRBC # BLD: 0 /100 WBCS — SIGNIFICANT CHANGE UP (ref 0–0)
PHOSPHATE SERPL-MCNC: 3.2 MG/DL — SIGNIFICANT CHANGE UP (ref 2.5–4.5)
PLATELET # BLD AUTO: 344 K/UL — SIGNIFICANT CHANGE UP (ref 150–400)
POTASSIUM SERPL-MCNC: 4 MMOL/L — SIGNIFICANT CHANGE UP (ref 3.5–5.3)
POTASSIUM SERPL-SCNC: 4 MMOL/L — SIGNIFICANT CHANGE UP (ref 3.5–5.3)
RBC # BLD: 2.78 M/UL — LOW (ref 3.8–5.2)
RBC # FLD: 18.4 % — HIGH (ref 10.3–14.5)
SODIUM SERPL-SCNC: 142 MMOL/L — SIGNIFICANT CHANGE UP (ref 135–145)
WBC # BLD: 8.53 K/UL — SIGNIFICANT CHANGE UP (ref 3.8–10.5)
WBC # FLD AUTO: 8.53 K/UL — SIGNIFICANT CHANGE UP (ref 3.8–10.5)

## 2020-06-22 PROCEDURE — 99232 SBSQ HOSP IP/OBS MODERATE 35: CPT

## 2020-06-22 PROCEDURE — 71045 X-RAY EXAM CHEST 1 VIEW: CPT | Mod: 26

## 2020-06-22 PROCEDURE — 99291 CRITICAL CARE FIRST HOUR: CPT

## 2020-06-22 PROCEDURE — 99292 CRITICAL CARE ADDL 30 MIN: CPT

## 2020-06-22 PROCEDURE — 99233 SBSQ HOSP IP/OBS HIGH 50: CPT

## 2020-06-22 RX ORDER — ALBUMIN HUMAN 25 %
50 VIAL (ML) INTRAVENOUS ONCE
Refills: 0 | Status: COMPLETED | OUTPATIENT
Start: 2020-06-22 | End: 2020-06-22

## 2020-06-22 RX ORDER — FUROSEMIDE 40 MG
10 TABLET ORAL ONCE
Refills: 0 | Status: COMPLETED | OUTPATIENT
Start: 2020-06-22 | End: 2020-06-22

## 2020-06-22 RX ORDER — HUMAN INSULIN 100 [IU]/ML
10 INJECTION, SUSPENSION SUBCUTANEOUS
Refills: 0 | Status: DISCONTINUED | OUTPATIENT
Start: 2020-06-23 | End: 2020-06-28

## 2020-06-22 RX ADMIN — ENOXAPARIN SODIUM 30 MILLIGRAM(S): 100 INJECTION SUBCUTANEOUS at 17:13

## 2020-06-22 RX ADMIN — OXYCODONE HYDROCHLORIDE 10 MILLIGRAM(S): 5 TABLET ORAL at 03:14

## 2020-06-22 RX ADMIN — OXYCODONE HYDROCHLORIDE 10 MILLIGRAM(S): 5 TABLET ORAL at 17:30

## 2020-06-22 RX ADMIN — Medication 15 MILLIGRAM(S): at 05:12

## 2020-06-22 RX ADMIN — HUMAN INSULIN 35 UNIT(S): 100 INJECTION, SUSPENSION SUBCUTANEOUS at 11:57

## 2020-06-22 RX ADMIN — HUMAN INSULIN 46 UNIT(S): 100 INJECTION, SUSPENSION SUBCUTANEOUS at 05:18

## 2020-06-22 RX ADMIN — SIMVASTATIN 20 MILLIGRAM(S): 20 TABLET, FILM COATED ORAL at 21:10

## 2020-06-22 RX ADMIN — LACOSAMIDE 200 MILLIGRAM(S): 50 TABLET ORAL at 17:13

## 2020-06-22 RX ADMIN — LACOSAMIDE 200 MILLIGRAM(S): 50 TABLET ORAL at 05:20

## 2020-06-22 RX ADMIN — HUMAN INSULIN 14 UNIT(S): 100 INJECTION, SUSPENSION SUBCUTANEOUS at 17:14

## 2020-06-22 RX ADMIN — SCOPALAMINE 1 PATCH: 1 PATCH, EXTENDED RELEASE TRANSDERMAL at 20:07

## 2020-06-22 RX ADMIN — CHLORHEXIDINE GLUCONATE 15 MILLILITER(S): 213 SOLUTION TOPICAL at 05:21

## 2020-06-22 RX ADMIN — Medication 5 MILLIGRAM(S): at 21:11

## 2020-06-22 RX ADMIN — Medication 2 MILLIGRAM(S): at 21:10

## 2020-06-22 RX ADMIN — Medication 25 MILLIGRAM(S): at 05:15

## 2020-06-22 RX ADMIN — OXYCODONE HYDROCHLORIDE 10 MILLIGRAM(S): 5 TABLET ORAL at 11:30

## 2020-06-22 RX ADMIN — Medication 25 MILLIGRAM(S): at 13:08

## 2020-06-22 RX ADMIN — Medication 10 MILLIGRAM(S): at 10:42

## 2020-06-22 RX ADMIN — SCOPALAMINE 1 PATCH: 1 PATCH, EXTENDED RELEASE TRANSDERMAL at 07:27

## 2020-06-22 RX ADMIN — Medication 25 MILLIGRAM(S): at 21:10

## 2020-06-22 RX ADMIN — LEVETIRACETAM 1000 MILLIGRAM(S): 250 TABLET, FILM COATED ORAL at 17:13

## 2020-06-22 RX ADMIN — Medication 150 MICROGRAM(S): at 05:17

## 2020-06-22 RX ADMIN — CHLORHEXIDINE GLUCONATE 15 MILLILITER(S): 213 SOLUTION TOPICAL at 17:13

## 2020-06-22 RX ADMIN — ENOXAPARIN SODIUM 30 MILLIGRAM(S): 100 INJECTION SUBCUTANEOUS at 05:18

## 2020-06-22 RX ADMIN — PANTOPRAZOLE SODIUM 40 MILLIGRAM(S): 20 TABLET, DELAYED RELEASE ORAL at 11:16

## 2020-06-22 RX ADMIN — CHLORHEXIDINE GLUCONATE 1 APPLICATION(S): 213 SOLUTION TOPICAL at 21:12

## 2020-06-22 RX ADMIN — Medication 50 MILLILITER(S): at 09:05

## 2020-06-22 RX ADMIN — AMLODIPINE BESYLATE 10 MILLIGRAM(S): 2.5 TABLET ORAL at 05:16

## 2020-06-22 RX ADMIN — LEVETIRACETAM 1000 MILLIGRAM(S): 250 TABLET, FILM COATED ORAL at 05:19

## 2020-06-22 RX ADMIN — Medication 4: at 11:57

## 2020-06-22 NOTE — PROGRESS NOTE ADULT - ASSESSMENT
63 yo F with uncontrolled T2DM with no known complications, hypothyroidism/ul HTN/ COPD and RA on chronic steroids here with mental status changes associated with shaking found to have a left frontal parenchymal hematoma with mass effect on the left lateral ventricle and intraventricular extension with blood layering in the occipital horns s/p intubation on TF, awaiting family GOC decisions.

## 2020-06-22 NOTE — PROGRESS NOTE ADULT - SUBJECTIVE AND OBJECTIVE BOX
SUMMARY: 63 yo F with past medical hx of hypothyroidism, HTN, IDDM, COPD/CHRIS on home O2, RA (on Prednisone), SBO s/p ex lap with lysis of adhesions c/b evisceration,  recent hospitalization in 3/2020 with encephalopathy, significantly hypothyroid (, without myxedema coma), small (stable) right sided SDH, UTI, catatonic reaction to VPA (now resolved) and DKA and psychiatric issues, 5/2020 hospitalization for GIB, Emphysematous cystitis, endometritis, Sigmoid colitis, morbid obesity, functional quadriplegia presented on 5/30 with AMS and was found to have L frontal IPH. Per report, patient seized at home prior to presentation, but has not noted to be seizing during the hospitalization. Continued Goals of care discussion with family; palliative and ethics involved    HCP: Norris Rush, Son [phone]; Daughter, Heavenly Schmidt [phone]; son asks that only him or his sister, Heavenly be contacted for patient updates/information as they are both proxy's for patient (29 May 2020 23:41)    ADMISSION SCORES:  GCS: 5 [E2 VNT M3]  ICH: 2    OVERNIGHT EVENTS: none    ICU Vital Signs Last 24 Hrs  ICU Vital Signs Last 24 Hrs  T(C): 37.7 (22 Jun 2020 03:00), Max: 37.7 (21 Jun 2020 19:00)  T(F): 99.8 (22 Jun 2020 03:00), Max: 99.9 (21 Jun 2020 23:00)  HR: 104 (22 Jun 2020 05:30) (92 - 114)  BP: 142/77 (22 Jun 2020 05:30) (105/50 - 160/98)  BP(mean): 94 (22 Jun 2020 05:30) (64 - 121)  ABP: --  ABP(mean): --  RR: 17 (22 Jun 2020 05:30) (16 - 29)  SpO2: 100% (22 Jun 2020 05:30) (97% - 100%)    I&O's Detail    21 Jun 2020 07:01  -  22 Jun 2020 07:00  --------------------------------------------------------  IN:    Enteral Tube Flush: 270 mL    Glucerna: 1495 mL  Total IN: 1765 mL    OUT:    Intermittent Catheterization - Urethral: 1850 mL  Total OUT: 1850 mL    Total NET: -85 mL    MEDICATIONS  (STANDING):  amLODIPine   Tablet 10 milliGRAM(s) Oral daily  chlorhexidine 0.12% Liquid 15 milliLiter(s) Oral Mucosa every 12 hours  chlorhexidine 4% Liquid 1 Application(s) Topical <User Schedule>  dextrose 5%. 1000 milliLiter(s) (50 mL/Hr) IV Continuous <Continuous>  dextrose 50% Injectable 25 Gram(s) IV Push once  doxazosin 2 milliGRAM(s) Oral at bedtime  enoxaparin Injectable 30 milliGRAM(s) SubCutaneous two times a day  insulin lispro (HumaLOG) corrective regimen sliding scale   SubCutaneous every 6 hours  insulin NPH human recombinant 18 Unit(s) SubCutaneous <User Schedule>  insulin NPH human recombinant 14 Unit(s) SubCutaneous <User Schedule>  insulin NPH human recombinant 35 Unit(s) SubCutaneous <User Schedule>  insulin NPH human recombinant 46 Unit(s) SubCutaneous <User Schedule>  lacosamide Solution 200 milliGRAM(s) Oral two times a day  levETIRAcetam  Solution 1000 milliGRAM(s) Oral two times a day  levothyroxine 150 MICROGram(s) Oral daily  metoprolol tartrate 25 milliGRAM(s) Oral every 8 hours  pantoprazole   Suspension 40 milliGRAM(s) Oral daily  predniSONE   Tablet 15 milliGRAM(s) Oral <User Schedule>  predniSONE   Tablet 5 milliGRAM(s) Oral at bedtime  scopolamine 1 mG/72 Hr(s) Patch 1 Patch Transdermal every 72 hours  simvastatin 20 milliGRAM(s) Oral at bedtime    MEDICATIONS  (PRN):  acetaminophen    Suspension .. 650 milliGRAM(s) Oral every 6 hours PRN Temp greater or equal to 38C (100.4F), Mild Pain (1 - 3)  glycopyrrolate Injectable 0.1 milliGRAM(s) IV Push three times a day PRN secretions  oxyCODONE    IR 5 milliGRAM(s) Oral every 6 hours PRN Moderate Pain (4 - 6)  oxyCODONE    IR 10 milliGRAM(s) Oral every 6 hours PRN Severe Pain (7 - 10)  sodium chloride 0.9% lock flush 10 milliLiter(s) IV Push every 1 hour PRN Pre/post blood products, medications, blood draw, and to maintain line patency     LABS:                      8.3    12.41 )-----------( 380      ( 19 Jun 2020 23:54 )             27.8   06-19    144  |  106  |  19  ----------------------------<  147<H>  4.2   |  27  |  0.62    Ca    9.0      19 Jun 2020 23:54  Phos  3.3     06-19  Mg     2.2     06-19             PHYSICAL EXAM:  Neurological:  intubated, EO spont, does not track, disconjugated gaze, pupils 3mm reactive, +cough/gag, not follow commands, upper extremities tremors when stimulated, LE TF  Cardiovascular: +s1, s2  Respiratory: clear to auscultation b/l  Gastrointestinal: soft, non-distended, non-tender  abd: soft, non-tender, nl bs ,+ skin lesion  Extremities: Warm, dry  + skin tear on buttocks    DIET: Glucerna @65    IMAGING: no new imaging SUMMARY: 63 yo F with past medical hx of hypothyroidism, HTN, IDDM, COPD/CHRIS on home O2, RA (on Prednisone), SBO s/p ex lap with lysis of adhesions c/b evisceration,  recent hospitalization in 3/2020 with encephalopathy, significantly hypothyroid (, without myxedema coma), small (stable) right sided SDH, UTI, catatonic reaction to VPA (now resolved) and DKA and psychiatric issues, 5/2020 hospitalization for GIB, Emphysematous cystitis, endometritis, Sigmoid colitis, morbid obesity, functional quadriplegia presented on 5/30 with AMS and was found to have L frontal IPH. Per report, patient seized at home prior to presentation, but has not noted to be seizing during the hospitalization. Continued Goals of care discussion with family; palliative and ethics involved    HCP: Norris Rush, Son [phone]; Daughter, Heavenly Schmidt [phone]; son asks that only him or his sister, Heavenly be contacted for patient updates/information as they are both proxy's for patient (29 May 2020 23:41)    ADMISSION SCORES:  GCS: 5 [E2 VNT M3]  ICH: 2    OVERNIGHT EVENTS: none    ICU Vital Signs Last 24 Hrs  ICU Vital Signs Last 24 Hrs  T(C): 37.7 (22 Jun 2020 03:00), Max: 37.7 (21 Jun 2020 19:00)  T(F): 99.8 (22 Jun 2020 03:00), Max: 99.9 (21 Jun 2020 23:00)  HR: 104 (22 Jun 2020 05:30) (92 - 114)  BP: 142/77 (22 Jun 2020 05:30) (105/50 - 160/98)  BP(mean): 94 (22 Jun 2020 05:30) (64 - 121)  RR: 17 (22 Jun 2020 05:30) (16 - 29)  SpO2: 100% (22 Jun 2020 05:30) (97% - 100%)    I&O's Detail    21 Jun 2020 07:01  -  22 Jun 2020 07:00  --------------------------------------------------------  IN:    Enteral Tube Flush: 270 mL    Glucerna: 1495 mL  Total IN: 1765 mL    OUT:    Intermittent Catheterization - Urethral: 1850 mL  Total OUT: 1850 mL    Total NET: -85 mL    MEDICATIONS  (STANDING):  amLODIPine   Tablet 10 milliGRAM(s) Oral daily  chlorhexidine 0.12% Liquid 15 milliLiter(s) Oral Mucosa every 12 hours  chlorhexidine 4% Liquid 1 Application(s) Topical <User Schedule>  dextrose 5%. 1000 milliLiter(s) (50 mL/Hr) IV Continuous <Continuous>  dextrose 50% Injectable 25 Gram(s) IV Push once  doxazosin 2 milliGRAM(s) Oral at bedtime  enoxaparin Injectable 30 milliGRAM(s) SubCutaneous two times a day  insulin lispro (HumaLOG) corrective regimen sliding scale   SubCutaneous every 6 hours  insulin NPH human recombinant 18 Unit(s) SubCutaneous <User Schedule>  insulin NPH human recombinant 14 Unit(s) SubCutaneous <User Schedule>  insulin NPH human recombinant 35 Unit(s) SubCutaneous <User Schedule>  insulin NPH human recombinant 46 Unit(s) SubCutaneous <User Schedule>  lacosamide Solution 200 milliGRAM(s) Oral two times a day  levETIRAcetam  Solution 1000 milliGRAM(s) Oral two times a day  levothyroxine 150 MICROGram(s) Oral daily  metoprolol tartrate 25 milliGRAM(s) Oral every 8 hours  pantoprazole   Suspension 40 milliGRAM(s) Oral daily  predniSONE   Tablet 15 milliGRAM(s) Oral <User Schedule>  predniSONE   Tablet 5 milliGRAM(s) Oral at bedtime  scopolamine 1 mG/72 Hr(s) Patch 1 Patch Transdermal every 72 hours  simvastatin 20 milliGRAM(s) Oral at bedtime    MEDICATIONS  (PRN):  acetaminophen    Suspension .. 650 milliGRAM(s) Oral every 6 hours PRN Temp greater or equal to 38C (100.4F), Mild Pain (1 - 3)  glycopyrrolate Injectable 0.1 milliGRAM(s) IV Push three times a day PRN secretions  oxyCODONE    IR 5 milliGRAM(s) Oral every 6 hours PRN Moderate Pain (4 - 6)  oxyCODONE    IR 10 milliGRAM(s) Oral every 6 hours PRN Severe Pain (7 - 10)  sodium chloride 0.9% lock flush 10 milliLiter(s) IV Push every 1 hour PRN Pre/post blood products, medications, blood draw, and to maintain line patency     LABS:                      8.3    12.41 )-----------( 380      ( 19 Jun 2020 23:54 )             27.8   06-19    144  |  106  |  19  ----------------------------<  147<H>  4.2   |  27  |  0.62    Ca    9.0      19 Jun 2020 23:54  Phos  3.3     06-19  Mg     2.2     06-19             PHYSICAL EXAM:  Neurological:  intubated, EO spont, does not track, disconjugated gaze, pupils 3mm reactive, +cough/gag, not follow commands, upper extremities tremors when stimulated, LE TF  Cardiovascular: +s1, s2  Respiratory: clear to auscultation b/l  Gastrointestinal: soft, non-distended, non-tender  abd: soft, non-tender, nl bs ,+ skin lesion  Extremities: Warm, dry  + skin tear on buttocks    DIET: Glucerna @65    IMAGING: no new imaging

## 2020-06-22 NOTE — PROGRESS NOTE ADULT - PROBLEM SELECTOR PROBLEM 2
After Visit Summary   4/3/2017    Javi Bansal    MRN: [id number]           Patient Information     Date Of Birth          1967        Visit Information        Provider Department      4/3/2017 8:45 AM Geena Celeste; Bryant Bartholomew MD [facility]        Today's Diagnoses     Neuritis    -  1       Follow-ups after your visit        Your next 10 appointments already scheduled     Apr 03, 2017  9:45 AM CDT   Return Visit with Ivan Schuler MD, Geena Celeste   [facility] and Infectious Diseases ([facility])    [address]  3rd Floor  [place] [address]   [phone]            Apr 04, 2017  9:30 AM CDT   LAB with Saima Elmore, [facility], [facility])    [address] [address]              Patient must bring picture ID.  Patient should be prepared to give a urine specimen  Please do not eat 10-12 hours before your appointment if you are coming in fasting for labs on lipids, cholesterol, or glucose (sugar).  Pregnant women should follow their Care Team instructions. Water with medications is okay. Do not drink coffee or other fluids.   If you have concerns about taking  your medications, please ask at office or if scheduling via [employer], send a message by clicking on Secure Messaging, Message Your Care Team.            Apr 04, 2017 10:00 AM CDT   Procedure - 2.5 hour with SHAHANA Tinsley ROOM 9   [address] [facility] ([facility])    [address] [address]               Apr 04, 2017 11:00 AM CDT   Heart Cath Heart Biopsy with [id number]   [facility] ([facility])    [address] [address]   [phone]            Apr 04, 2017  1:45 PM CDT   RETURN HEART  TRANSPLANT with SAMM Ramos CNP, Saima Elmore   [facility] ([facility])    [address]  3rd Floor  [place] [id number] [phone]            Apr 10, 2017  4:45 PM CDT   (Arrive by 4:30 PM)   MR BRACHIAL PLEXUS W/O & W CONTRAST with [id number]   [facility] MRI ([facility])    [address]  1st Floor  [place] [id number] [phone]           Take your medicines as usual, unless your doctor tells you not to. Bring a list of your current medicines to your exam (including vitamins, minerals and over-the-counter drugs).  You will be given intravenous contrast for this exam. To prepare:   The day before your exam, drink extra fluids at least six 8-ounce glasses (unless your doctor tells you to restrict your fluids).   Have a blood test (creatinine test) within 30 days of your exam. Go to your clinic or Diagnostic Imaging Department for this test.  The MRI machine uses a strong magnet. Please wear clothes without metal (snaps, zippers). A sweatsuit works well, or we may give you a hospital gown.  Please remove any body piercings and hair extensions before you arrive. You will also remove watches, jewelry, hairpins, wallets, dentures, partial dental plates and hearing aids. You may wear contact lenses, and you may be able to wear your rings. We have a safe place to keep your personal items, but it is safer to leave them at home.   **IMPORTANT** THE INSTRUCTIONS BELOW ARE ONLY FOR THOSE PATIENTS WHO HAVE BEEN TOLD THEY WILL RECEIVE SEDATION OR GENERAL ANESTHESIA DURING THEIR MRI PROCEDURE:  IF YOU WILL RECEIVE SEDATION (take medicine to help you relax during your exam):   You must get the medicine from your doctor before you arrive. Bring the medicine to the exam. Do not take it at home.   Arrive one hour early. Bring someone who can take you home after the test. Your medicine will make you sleepy. After  the exam, you may not drive, take a bus or take a taxi by yourself.   No eating 8 hours before your exam. You may have clear liquids up until 4 hours before your exam. (Clear liquids include water, clear tea, black coffee and fruit juice without pulp.)  IF YOU WILL RECEIVE ANESTHESIA (be asleep for your exam):   Arrive 1 1/2 hours early. Bring someone who can take you home after the test. You may not drive, take a bus or take a taxi by yourself.   No eating 8 hours before your exam. You may have clear liquids up until 4 hours before your exam. (Clear liquids include water, clear tea, black coffee and fruit juice without pulp.)  Please call the Imaging Department at your exam site with any questions.              Future tests that were ordered for you today     Open Future Orders        Priority Expected Expires Ordered    MR Brachial Plexus w/o & w Contrast Routine  4/3/2018 4/3/2017            Who to contact     Please call your clinic at [phone] to:    Ask questions about your health    Make or cancel appointments    Discuss your medicines    Learn about your test results    Speak to your doctor   If you have compliments or concerns about an experience at your clinic, or if you wish to file a complaint, please contact [facility] Patient Relations at [phone] or email us at [email or web address]         Additional Information About Your Visit        [facility] Information     Virtual Paper is an electronic gateway that provides easy, online access to your medical records. With Virtual Paper, you can request a clinic appointment, read your test results, renew a prescription or communicate with your care team.     To sign up for [employer] visit the website at [email or web address]   You will be asked to enter the access code listed below, as well as some personal information. Please follow the directions to create your username and password.     Your access code is:  [email or web address]  Expires: 2017  6:30 AM     Your access code will  in 90 days. If you need help or a new code, please contact your [facility] or call [phone] for assistance.        Care EveryWhere ID     This is your Care EveryWhere ID. This could be used by other organizations to access your [place] medical records  [id number]        Your Vitals Were     Height BMI (Body Mass Index)                1.676 m (5' 6\") 25.18 kg/m2           Blood Pressure from Last 3 Encounters:   17 123/82   17 123/82   03/15/17 126/86    Weight from Last 3 Encounters:   17 70.8 kg (156 lb)   17 70.8 kg (156 lb 1.6 oz)   17 70.1 kg (154 lb 9.6 oz)               Primary Care Provider Office Phone # Fax #    Ivan Burrell [phone]       [address] [address]        Thank you!     Thank you for choosing [facility]  for your care. Our goal is always to provide you with excellent care. Hearing back from our patients is one way we can continue to improve our services. Please take a few minutes to complete the written survey that you may receive in the mail after your visit with us. Thank you!             Your Updated Medication List - Protect others around you: Learn how to safely use, store and throw away your medicines at www.disposemymeds.org.          This list is accurate as of: 4/3/17  9:30 AM.  Always use your most recent med list.                   Brand Name Dispense Instructions for use    aspirin 81 MG EC tablet     90 tablet    Take 1 tablet (81 mg) by mouth daily       calcium carb 1250 mg (500 mg Kenaitze)/vitamin D 200 units 500-200 MG-UNIT per tablet    OSCAL with D    60 tablet    Take 1 tablet by mouth 2 times daily (with meals)       isoniazid 300 MG tablet    NYDRAZID    90 tablet    Take 1 tablet (300 mg) by mouth daily       ketoconazole 2 % shampoo    NIZORAL    120 mL    " Apply topically three times a week Alternate with Head and Shoulders.       methylPREDNISolone 4 MG tablet    MEDROL DOSEPAK    21 tablet    Follow package instructions       multivitamin, therapeutic with minerals Tabs tablet     30 each    Take 1 tablet by mouth daily       mycophenolate 250 MG capsule    CELLCEPT - GENERIC EQUIVALENT    360 capsule    Take 6 capsules (1,500 mg) by mouth 2 times daily       nystatin [id number] UNIT/ML suspension    MYCOSTATIN    473 mL    Take 5 mLs (500,000 Units) by mouth 4 times daily       order for DME     1 Device    Equipment being ordered: Splint - Cubital tunnel splint for ulnar neuropathy       pantoprazole 40 MG EC tablet    PROTONIX    30 tablet    Take 1 tablet (40 mg) by mouth every morning       pravastatin 20 MG tablet    PRAVACHOL    30 tablet    Take 1 tablet (20 mg) by mouth every evening       predniSONE 5 MG tablet    DELTASONE    90 tablet    Take 10 mg in the morning (2 tablets); take 5 mg in the evening (1 tablet)       senna-docusate 8.6-50 MG per tablet    SENOKOT-S;PERICOLACE    20 tablet    Take 2 tablets by mouth nightly as needed for constipation (If no stools during the day)       sulfamethoxazole-trimethoprim 800-160 MG per tablet    BACTRIM DS/SEPTRA DS    8 tablet    Take 1 tablet by mouth twice a week On mondays and thursdays       tacrolimus 1 MG capsule    PROGRAF - GENERIC EQUIVALENT    120 capsule    Take 2 capsules (2 mg) by mouth 2 times daily          Adult Hypothyroidism

## 2020-06-22 NOTE — PROGRESS NOTE ADULT - ASSESSMENT
ASSESSMENT:  Left frontal intracerebral hemorrhage, generalized periodic discharges  Continued goals of care conversation, palliative and ethics involved      NEURO:  Neuro q4h checks  seizure management: Keppra and Vimpat  Pain management with Tylenol & fentanyl prn  Given previous psych history and workup not revealing any cause, NMDA encephalitis testing sent -> NMDA AB neg.    Trach and PEG being discussed with family. no decision yet   Activity: [] OOB as tolerated [] Bedrest [X] PT [X] OT [] PMNR      PULM:  Intubated since 5/29- Day 25 of intubation.- awaiting family decision on goals of care - not likely to withstand extubation.   H/O of COPD   CPAP as tolerated. Has failed CPAP before   A lot of oral secretions- added scopolamine patch       CV:  Keep -160  TTE negative for PFO  HTN: Continue norvasc   sinus tachycardia, increase metoprolol to 25 q6h  Statin for HLD        RENAL:  IVL. Monitor Is/Os  Female iraheta  on cardura for urinary retention      GI:  Diet: On Glucerna OGT feeds  Continue Banana flakes and metamucil , LBM 6/17   GI prophylaxis [] not indicated [X] PO PPI [] other:        ENDO: S/P hyperglycemia - IR DM  Endo Following for adjustment of NPH; will follow up on sugar levels  Continue PO synthroid  Continue prednisone 15mg, 5 mg at bedtime (was on it at home for RA). Likely etiology of bruising  Goal euglycemia (-180)      HEME/ONC:  On SQL for DVT ppx- 30mg SQ q12.  VTE prophylaxis: [X] SCDs [X] chemoprophylaxis [X] high risk of DVT/PE on admission due to: bed bound at baseline  LE doppler 5/30- No DVT        ID:  Afebrile   Ertapnem for ESBL found growing in urine culture on 6/4 - ended on  - 6/14      MISC: Family meeting with palliative/ethics/icu on 6/16. Family understands all options are bad--trach/PEG vs. compassionate extubation. No decisions were made despite palliative care follow up after the meeting. Ethics spoke with Heavenly on 6/18--see documentation. No decisions made.       SOCIAL/FAMILY:  [X] updated family via phone  [] family meeting    CODE STATUS:  [X] Full Code     DISPOSITION:  [X] ICU    [X] Patient is at high risk of neurologic deterioration/death due to: resp failure, seizures, hemorrhage. ASSESSMENT:  Left frontal intracerebral hemorrhage, generalized periodic discharges  Continued goals of care conversation, palliative and ethics involved      NEURO:  Neuro q4h checks  seizure management: Keppra and Vimpat  Pain management with Tylenol & fentanyl prn  Given previous psych history and workup not revealing any cause, NMDA encephalitis testing sent -> NMDA AB neg.    Trach and PEG being discussed with family. no decision yet   Activity: [] OOB as tolerated [] Bedrest [X] PT [X] OT [] PMNR      PULM:  Intubated since 5/29- Day 25 of intubation.- awaiting family decision on goals of care - not likely to withstand extubation.   H/O of COPD   CPAP as tolerated. Has failed CPAP before   A lot of oral secretions- added scopolamine patch       CV:  Anasarca- lasix + albumin X1  Keep -160  TTE negative for PFO  HTN: Continue norvasc   sinus tachycardia, increase metoprolol to 25 q6h  Statin for HLD    Check BMP      RENAL:  IVL. Monitor Is/Os  Female iraheta  on cardura for urinary retention      GI:  Diet: On Glucerna OGT feeds  Continue Banana flakes and metamucil , LBM 6/21   GI prophylaxis [] not indicated [X] PO PPI [] other:        ENDO: S/P hyperglycemia - IR DM  Endo Following for adjustment of NPH; will follow up on sugar levels  Continue PO synthroid  Continue prednisone 15mg, 5 mg at bedtime (was on it at home for RA). Likely etiology of bruising  Goal euglycemia (-180)      HEME/ONC: anemia - no CBC  On SQL for DVT ppx- 30mg SQ q12.  VTE prophylaxis: [X] SCDs [X] chemoprophylaxis [X] high risk of DVT/PE on admission due to: bed bound at baseline  LE doppler 5/30- No DVT        ID:  Afebrile   Ertapnem for ESBL found growing in urine culture on 6/4 - ended on  - 6/14      MISC: Family meeting with palliative/ethics/icu on 6/16. Family understands all options are bad--trach/PEG vs. compassionate extubation. No decisions were made despite palliative care follow up after the meeting. Ethics spoke with Heavenly on 6/18--see documentation. No decisions made.       SOCIAL/FAMILY:  [X] updated family via phone  [] family meeting    CODE STATUS:  [X] Full Code     DISPOSITION:  [X] ICU    [X] Patient is at high risk of neurologic deterioration/death due to: resp failure, seizures, hemorrhage.

## 2020-06-22 NOTE — PROGRESS NOTE ADULT - PROBLEM SELECTOR PLAN 2
-Levothyroxine 150mcg per PEG daily  -Hold TF 1 hour prior and after levothyroxine given. Do not give with other medications.  Jia Jules MD  Pager: [phone], between 9am-6pm  Nights and Weekends: [phone]      pager: [phone]   cell: [phone]  office: [phone]    -I spent a total time of 25 mins with the patient at bedside/on unit of which more than 50% of time was spent on counseling/coordination of care.

## 2020-06-22 NOTE — PROGRESS NOTE ADULT - PROBLEM SELECTOR PLAN 1
-test BG Q6h  -Adjust 6pm and 12am NPH 10 10 units sq   -Continue NPH 35 units sq q12pm  -continue NPH 46 units sq q6am  -Hold NPH if TF off. Can reduce dose by half if BG less than 100mg/dl  -continue Humalog moderate correction scale Q6h  -Please notify endocrine if any changes in steroid regimen or TF regimen so we can adjust insulin regimen accordingly.

## 2020-06-22 NOTE — PROGRESS NOTE ADULT - SUBJECTIVE AND OBJECTIVE BOX
Chief Complaint/Follow-up on:     Subjective:    MEDICATIONS  (STANDING):  amLODIPine   Tablet 10 milliGRAM(s) Oral daily  chlorhexidine 0.12% Liquid 15 milliLiter(s) Oral Mucosa every 12 hours  chlorhexidine 4% Liquid 1 Application(s) Topical <User Schedule>  dextrose 5%. 1000 milliLiter(s) (50 mL/Hr) IV Continuous <Continuous>  dextrose 50% Injectable 25 Gram(s) IV Push once  doxazosin 2 milliGRAM(s) Oral at bedtime  enoxaparin Injectable 30 milliGRAM(s) SubCutaneous two times a day  insulin lispro (HumaLOG) corrective regimen sliding scale   SubCutaneous every 6 hours  insulin NPH human recombinant 35 Unit(s) SubCutaneous <User Schedule>  insulin NPH human recombinant 46 Unit(s) SubCutaneous <User Schedule>  lacosamide Solution 200 milliGRAM(s) Oral two times a day  levETIRAcetam  Solution 1000 milliGRAM(s) Oral two times a day  levothyroxine 150 MICROGram(s) Oral daily  metoprolol tartrate 25 milliGRAM(s) Oral every 8 hours  pantoprazole   Suspension 40 milliGRAM(s) Oral daily  predniSONE   Tablet 15 milliGRAM(s) Oral <User Schedule>  predniSONE   Tablet 5 milliGRAM(s) Oral at bedtime  scopolamine 1 mG/72 Hr(s) Patch 1 Patch Transdermal every 72 hours  simvastatin 20 milliGRAM(s) Oral at bedtime    MEDICATIONS  (PRN):  acetaminophen    Suspension .. 650 milliGRAM(s) Oral every 6 hours PRN Temp greater or equal to 38C (100.4F), Mild Pain (1 - 3)  glycopyrrolate Injectable 0.1 milliGRAM(s) IV Push three times a day PRN secretions  oxyCODONE    IR 5 milliGRAM(s) Oral every 6 hours PRN Moderate Pain (4 - 6)  oxyCODONE    IR 10 milliGRAM(s) Oral every 6 hours PRN Severe Pain (7 - 10)  sodium chloride 0.9% lock flush 10 milliLiter(s) IV Push every 1 hour PRN Pre/post blood products, medications, blood draw, and to maintain line patency      PHYSICAL EXAM:  VITALS: T(C): 37.3 (06-22-20 @ 15:00)  T(F): 99.1 (06-22-20 @ 15:00), Max: 99.9 (06-21-20 @ 23:00)  HR: 95 (06-22-20 @ 16:44) (92 - 110)  BP: 135/80 (06-22-20 @ 15:00) (112/57 - 144/108)  RR:  (16 - 28)  SpO2:  (97% - 100%)  Wt(kg): --  GENERAL: NAD, well-groomed, well-developed  EYES: No proptosis, no injection  HEENT:  Atraumatic, Normocephalic, moist mucous membranes  THYROID: Normal size, no palpable nodules  RESPIRATORY: Clear to auscultation bilaterally; No rales, rhonchi, wheezing, or rubs  CARDIOVASCULAR: Regular rate and rhythm; No murmurs; no peripheral edema  GI: Soft, nontender, non distended, normal bowel sounds  CUSHING'S SIGNS: no striae    POCT Blood Glucose.: 118 mg/dL (06-22-20 @ 17:11)  POCT Blood Glucose.: 211 mg/dL (06-22-20 @ 11:54)  POCT Blood Glucose.: 146 mg/dL (06-22-20 @ 05:09)  POCT Blood Glucose.: 82 mg/dL (06-21-20 @ 22:52)  POCT Blood Glucose.: 101 mg/dL (06-21-20 @ 17:03)  POCT Blood Glucose.: 143 mg/dL (06-21-20 @ 11:54)  POCT Blood Glucose.: 138 mg/dL (06-21-20 @ 05:19)  POCT Blood Glucose.: 110 mg/dL (06-20-20 @ 23:41)  POCT Blood Glucose.: 109 mg/dL (06-20-20 @ 17:50)  POCT Blood Glucose.: 258 mg/dL (06-20-20 @ 12:18)  POCT Blood Glucose.: 201 mg/dL (06-20-20 @ 05:06)  POCT Blood Glucose.: 129 mg/dL (06-19-20 @ 17:57)    06-19    144  |  106  |  19  ----------------------------<  147<H>  4.2   |  27  |  0.62    EGFR if : 112  EGFR if non : 97    Ca    9.0      06-19  Mg     2.2     06-19  Phos  3.3     06-19            Thyroid Function Tests:  06-11 @ 13:58 TSH 8.26 FreeT4 -- T3 55 Anti TPO -- Anti Thyroglobulin Ab -- TSI --  06-04 @ 01:58 TSH -- FreeT4 2.7 T3 -- Anti TPO -- Anti Thyroglobulin Ab -- TSI -- Chief Complaint/Follow-up on: T2D    Subjective: Patient still not awake. Primary team working with palliative to assess GOC with her children.   Patient was 84 at midnight so her NPh was halved from 18 to 9 units sq qhs.    MEDICATIONS  (STANDING):  amLODIPine   Tablet 10 milliGRAM(s) Oral daily  chlorhexidine 0.12% Liquid 15 milliLiter(s) Oral Mucosa every 12 hours  chlorhexidine 4% Liquid 1 Application(s) Topical <User Schedule>  dextrose 5%. 1000 milliLiter(s) (50 mL/Hr) IV Continuous <Continuous>  dextrose 50% Injectable 25 Gram(s) IV Push once  doxazosin 2 milliGRAM(s) Oral at bedtime  enoxaparin Injectable 30 milliGRAM(s) SubCutaneous two times a day  insulin lispro (HumaLOG) corrective regimen sliding scale   SubCutaneous every 6 hours  insulin NPH human recombinant 35 Unit(s) SubCutaneous <User Schedule>  insulin NPH human recombinant 46 Unit(s) SubCutaneous <User Schedule>  lacosamide Solution 200 milliGRAM(s) Oral two times a day  levETIRAcetam  Solution 1000 milliGRAM(s) Oral two times a day  levothyroxine 150 MICROGram(s) Oral daily  metoprolol tartrate 25 milliGRAM(s) Oral every 8 hours  pantoprazole   Suspension 40 milliGRAM(s) Oral daily  predniSONE   Tablet 15 milliGRAM(s) Oral <User Schedule>  predniSONE   Tablet 5 milliGRAM(s) Oral at bedtime  scopolamine 1 mG/72 Hr(s) Patch 1 Patch Transdermal every 72 hours  simvastatin 20 milliGRAM(s) Oral at bedtime    MEDICATIONS  (PRN):  acetaminophen    Suspension .. 650 milliGRAM(s) Oral every 6 hours PRN Temp greater or equal to 38C (100.4F), Mild Pain (1 - 3)  glycopyrrolate Injectable 0.1 milliGRAM(s) IV Push three times a day PRN secretions  oxyCODONE    IR 5 milliGRAM(s) Oral every 6 hours PRN Moderate Pain (4 - 6)  oxyCODONE    IR 10 milliGRAM(s) Oral every 6 hours PRN Severe Pain (7 - 10)  sodium chloride 0.9% lock flush 10 milliLiter(s) IV Push every 1 hour PRN Pre/post blood products, medications, blood draw, and to maintain line patency      PHYSICAL EXAM:  VITALS: T(C): 37.3 (06-22-20 @ 15:00)  T(F): 99.1 (06-22-20 @ 15:00), Max: 99.9 (06-21-20 @ 23:00)  HR: 95 (06-22-20 @ 16:44) (92 - 110)  BP: 135/80 (06-22-20 @ 15:00) (112/57 - 144/108)  RR:  (16 - 28)  SpO2:  (97% - 100%)  Wt(kg): --  GENERAL: +intubated and sedated  EYES: closed with some eye blinking  RESPIRATORY: Clear to auscultation bilaterally; No rales, rhonchi, wheezing, or rubs  CARDIOVASCULAR: rate in the 102-105 range  GI: Soft, nontender, non distended, normal bowel sounds      POCT Blood Glucose.: 118 mg/dL (06-22-20 @ 17:11)  POCT Blood Glucose.: 211 mg/dL (06-22-20 @ 11:54)  POCT Blood Glucose.: 146 mg/dL (06-22-20 @ 05:09)  POCT Blood Glucose.: 82 mg/dL (06-21-20 @ 22:52)  POCT Blood Glucose.: 101 mg/dL (06-21-20 @ 17:03)  POCT Blood Glucose.: 143 mg/dL (06-21-20 @ 11:54)  POCT Blood Glucose.: 138 mg/dL (06-21-20 @ 05:19)  POCT Blood Glucose.: 110 mg/dL (06-20-20 @ 23:41)  POCT Blood Glucose.: 109 mg/dL (06-20-20 @ 17:50)  POCT Blood Glucose.: 258 mg/dL (06-20-20 @ 12:18)  POCT Blood Glucose.: 201 mg/dL (06-20-20 @ 05:06)  POCT Blood Glucose.: 129 mg/dL (06-19-20 @ 17:57)    06-19    144  |  106  |  19  ----------------------------<  147<H>  4.2   |  27  |  0.62    EGFR if : 112  EGFR if non : 97    Ca    9.0      06-19  Mg     2.2     06-19  Phos  3.3     06-19            Thyroid Function Tests:  06-11 @ 13:58 TSH 8.26 T3 55   06-04 @ 01:58 FreeT4 2.7

## 2020-06-22 NOTE — PROGRESS NOTE ADULT - SUBJECTIVE AND OBJECTIVE BOX
SUMMARY:    Daytime Events: Pt received albumin/lasix x1     T(C): 37.2 (06-22-20 @ 19:00), Max: 37.7 (06-21-20 @ 23:00)  HR: 87 (06-22-20 @ 21:42) (87 - 110)  BP: 130/83 (06-22-20 @ 21:42) (121/79 - 150/91)  RR: 14 (06-22-20 @ 21:42) (14 - 21)  SpO2: 100% (06-22-20 @ 21:42) (97% - 100%)  Wt(kg): --    Physical Exam:  EXAMINATION:  Intubated; EO to noxioous  pupils 4mm and reactive  +gag; not FC    IMAGING/DATA: [] Reviewed    acetaminophen    Suspension .. 650 milliGRAM(s) Oral every 6 hours PRN  amLODIPine   Tablet 10 milliGRAM(s) Oral daily  chlorhexidine 0.12% Liquid 15 milliLiter(s) Oral Mucosa every 12 hours  chlorhexidine 4% Liquid 1 Application(s) Topical <User Schedule>  dextrose 5%. 1000 milliLiter(s) IV Continuous <Continuous>  dextrose 50% Injectable 25 Gram(s) IV Push once  doxazosin 2 milliGRAM(s) Oral at bedtime  enoxaparin Injectable 30 milliGRAM(s) SubCutaneous two times a day  glycopyrrolate Injectable 0.1 milliGRAM(s) IV Push three times a day PRN  insulin lispro (HumaLOG) corrective regimen sliding scale   SubCutaneous every 6 hours  insulin NPH human recombinant 35 Unit(s) SubCutaneous <User Schedule>  insulin NPH human recombinant 46 Unit(s) SubCutaneous <User Schedule>  lacosamide Solution 200 milliGRAM(s) Oral two times a day  levETIRAcetam  Solution 1000 milliGRAM(s) Oral two times a day  levothyroxine 150 MICROGram(s) Oral daily  metoprolol tartrate 25 milliGRAM(s) Oral every 8 hours  oxyCODONE    IR 5 milliGRAM(s) Oral every 6 hours PRN  oxyCODONE    IR 10 milliGRAM(s) Oral every 6 hours PRN  pantoprazole   Suspension 40 milliGRAM(s) Oral daily  predniSONE   Tablet 15 milliGRAM(s) Oral <User Schedule>  predniSONE   Tablet 5 milliGRAM(s) Oral at bedtime  scopolamine 1 mG/72 Hr(s) Patch 1 Patch Transdermal every 72 hours  simvastatin 20 milliGRAM(s) Oral at bedtime  sodium chloride 0.9% lock flush 10 milliLiter(s) IV Push every 1 hour PRN                            8.1    8.53  )-----------( 344      ( 22 Jun 2020 20:33 )             26.7     06-22    142  |  104  |  27<H>  ----------------------------<  100<H>  4.0   |  28  |  0.65    Ca    9.0      22 Jun 2020 20:33  Phos  3.2     06-22  Mg     2.3     06-22        DEVICES:   [] Restraints [] ET tube [] central line [] arterial line [] iraheta [] NGT/OGT [] EVD [] LD [] MABEL/HMV [] Trach [] PEG [] Chest Tube [] other:

## 2020-06-22 NOTE — PROGRESS NOTE ADULT - ASSESSMENT
ASSESSMENT/PLAN:  cont CPAP trials; GOC discussions in prgress with family    [x] Patient is at high risk of neurologic deterioration/death due to: cerebral edema, brain compression    Time seen:  Time spent: _35__ [x] critical care minutes

## 2020-06-23 LAB
GLUCOSE BLDC GLUCOMTR-MCNC: 128 MG/DL — HIGH (ref 70–99)
GLUCOSE BLDC GLUCOMTR-MCNC: 145 MG/DL — HIGH (ref 70–99)
GLUCOSE BLDC GLUCOMTR-MCNC: 209 MG/DL — HIGH (ref 70–99)
GLUCOSE BLDC GLUCOMTR-MCNC: 249 MG/DL — HIGH (ref 70–99)

## 2020-06-23 PROCEDURE — 99232 SBSQ HOSP IP/OBS MODERATE 35: CPT

## 2020-06-23 PROCEDURE — 99231 SBSQ HOSP IP/OBS SF/LOW 25: CPT

## 2020-06-23 PROCEDURE — 71045 X-RAY EXAM CHEST 1 VIEW: CPT | Mod: 26

## 2020-06-23 PROCEDURE — 99291 CRITICAL CARE FIRST HOUR: CPT

## 2020-06-23 RX ORDER — METOPROLOL TARTRATE 50 MG
25 TABLET ORAL EVERY 6 HOURS
Refills: 0 | Status: DISCONTINUED | OUTPATIENT
Start: 2020-06-23 | End: 2020-06-24

## 2020-06-23 RX ADMIN — OXYCODONE HYDROCHLORIDE 10 MILLIGRAM(S): 5 TABLET ORAL at 08:04

## 2020-06-23 RX ADMIN — CHLORHEXIDINE GLUCONATE 1 APPLICATION(S): 213 SOLUTION TOPICAL at 21:08

## 2020-06-23 RX ADMIN — ENOXAPARIN SODIUM 30 MILLIGRAM(S): 100 INJECTION SUBCUTANEOUS at 05:06

## 2020-06-23 RX ADMIN — Medication 25 MILLIGRAM(S): at 23:03

## 2020-06-23 RX ADMIN — Medication 150 MICROGRAM(S): at 05:03

## 2020-06-23 RX ADMIN — SCOPALAMINE 1 PATCH: 1 PATCH, EXTENDED RELEASE TRANSDERMAL at 06:49

## 2020-06-23 RX ADMIN — PANTOPRAZOLE SODIUM 40 MILLIGRAM(S): 20 TABLET, DELAYED RELEASE ORAL at 11:10

## 2020-06-23 RX ADMIN — Medication 4: at 05:13

## 2020-06-23 RX ADMIN — Medication 25 MILLIGRAM(S): at 11:10

## 2020-06-23 RX ADMIN — CHLORHEXIDINE GLUCONATE 15 MILLILITER(S): 213 SOLUTION TOPICAL at 05:07

## 2020-06-23 RX ADMIN — HUMAN INSULIN 46 UNIT(S): 100 INJECTION, SUSPENSION SUBCUTANEOUS at 05:13

## 2020-06-23 RX ADMIN — HUMAN INSULIN 10 UNIT(S): 100 INJECTION, SUSPENSION SUBCUTANEOUS at 23:03

## 2020-06-23 RX ADMIN — SCOPALAMINE 1 PATCH: 1 PATCH, EXTENDED RELEASE TRANSDERMAL at 23:40

## 2020-06-23 RX ADMIN — Medication 25 MILLIGRAM(S): at 17:06

## 2020-06-23 RX ADMIN — ENOXAPARIN SODIUM 30 MILLIGRAM(S): 100 INJECTION SUBCUTANEOUS at 17:06

## 2020-06-23 RX ADMIN — Medication 5 MILLIGRAM(S): at 21:08

## 2020-06-23 RX ADMIN — Medication 4: at 11:20

## 2020-06-23 RX ADMIN — Medication 650 MILLIGRAM(S): at 05:06

## 2020-06-23 RX ADMIN — LACOSAMIDE 200 MILLIGRAM(S): 50 TABLET ORAL at 17:06

## 2020-06-23 RX ADMIN — CHLORHEXIDINE GLUCONATE 15 MILLILITER(S): 213 SOLUTION TOPICAL at 17:06

## 2020-06-23 RX ADMIN — Medication 2 MILLIGRAM(S): at 21:07

## 2020-06-23 RX ADMIN — LEVETIRACETAM 1000 MILLIGRAM(S): 250 TABLET, FILM COATED ORAL at 05:05

## 2020-06-23 RX ADMIN — OXYCODONE HYDROCHLORIDE 10 MILLIGRAM(S): 5 TABLET ORAL at 01:30

## 2020-06-23 RX ADMIN — HUMAN INSULIN 35 UNIT(S): 100 INJECTION, SUSPENSION SUBCUTANEOUS at 11:21

## 2020-06-23 RX ADMIN — Medication 15 MILLIGRAM(S): at 05:03

## 2020-06-23 RX ADMIN — SIMVASTATIN 20 MILLIGRAM(S): 20 TABLET, FILM COATED ORAL at 21:07

## 2020-06-23 RX ADMIN — OXYCODONE HYDROCHLORIDE 10 MILLIGRAM(S): 5 TABLET ORAL at 17:05

## 2020-06-23 RX ADMIN — SCOPALAMINE 1 PATCH: 1 PATCH, EXTENDED RELEASE TRANSDERMAL at 21:08

## 2020-06-23 RX ADMIN — LACOSAMIDE 200 MILLIGRAM(S): 50 TABLET ORAL at 05:04

## 2020-06-23 RX ADMIN — Medication 25 MILLIGRAM(S): at 05:05

## 2020-06-23 RX ADMIN — LEVETIRACETAM 1000 MILLIGRAM(S): 250 TABLET, FILM COATED ORAL at 17:06

## 2020-06-23 RX ADMIN — AMLODIPINE BESYLATE 10 MILLIGRAM(S): 2.5 TABLET ORAL at 05:04

## 2020-06-23 RX ADMIN — HUMAN INSULIN 10 UNIT(S): 100 INJECTION, SUSPENSION SUBCUTANEOUS at 17:06

## 2020-06-23 NOTE — PROGRESS NOTE ADULT - PROBLEM SELECTOR PLAN 6
Reached out to patients HCP, Heavenly who shares she spoke with Dr Haider this morning who advised her that it is of the utmost importance to go forward with trach and peg in order to avoid ET tube complications including but not limited to pneumonitis.  Heavenly will be speaking to her extended family again today in the hope of a final decision.    Ethics input appreciated.

## 2020-06-23 NOTE — PROGRESS NOTE ADULT - SUBJECTIVE AND OBJECTIVE BOX
SUMMARY: 65 yo F with past medical hx of hypothyroidism, HTN, IDDM, COPD/CHRIS on home O2, RA (on Prednisone), SBO s/p ex lap with lysis of adhesions c/b evisceration,  recent hospitalization in 3/2020 with encephalopathy, significantly hypothyroid (, without myxedema coma), small (stable) right sided SDH, UTI, catatonic reaction to VPA (now resolved) and DKA and psychiatric issues, 2020 hospitalization for GIB, Emphysematous cystitis, endometritis, Sigmoid colitis, morbid obesity, functional quadriplegia presented on  with AMS and was found to have L frontal IPH. Per report, patient seized at home prior to presentation, but has not noted to be seizing during the hospitalization. Continued Goals of care discussion with family; palliative and ethics involved    HCP: Norris Rush, Son [phone]; Daughter, Heavenly Schmidt [phone]; son asks that only him or his sister, Heavenly be contacted for patient updates/information as they are both proxy's for patient (29 May 2020 23:41)    ADMISSION SCORES:  GCS: 5 [E2 VNT M3]  ICH: 2    OVERNIGHT EVENTS: none    ICU Vital Signs Last 24 Hrs  ICU Vital Signs Last 24 Hrs  T(C): 37.2 (2020 07:00), Max: 37.3 (2020 15:00)  T(F): 98.9 (2020 07:00), Max: 99.1 (2020 15:00)  HR: 107 (2020 08:18) (87 - 113)  BP: 139/83 (2020 08:00) (121/76 - 156/86)  BP(mean): 98 (2020 08:00) (87 - 107)  ABP: --  ABP(mean): --  RR: 14 (2020 08:00) (14 - 21)  SpO2: 99% (2020 08:18) (97% - 100%)    I&O's Detail    2020 07:01  -  2020 07:00  --------------------------------------------------------  IN:    Enteral Tube Flush: 240 mL    Glucerna: 1560 mL    IV PiggyBack: 50 mL  Total IN: 1850 mL    OUT:    Incontinent per Collection Ba mL    Intermittent Catheterization - Urethral: 700 mL  Total OUT: 1850 mL    Total NET: 0 mL    MEDICATIONS  (STANDING):  amLODIPine   Tablet 10 milliGRAM(s) Oral daily  chlorhexidine 0.12% Liquid 15 milliLiter(s) Oral Mucosa every 12 hours  chlorhexidine 4% Liquid 1 Application(s) Topical <User Schedule>  dextrose 5%. 1000 milliLiter(s) (50 mL/Hr) IV Continuous <Continuous>  dextrose 50% Injectable 25 Gram(s) IV Push once  doxazosin 2 milliGRAM(s) Oral at bedtime  enoxaparin Injectable 30 milliGRAM(s) SubCutaneous two times a day  insulin lispro (HumaLOG) corrective regimen sliding scale   SubCutaneous every 6 hours  insulin NPH human recombinant 10 Unit(s) SubCutaneous <User Schedule>  insulin NPH human recombinant 35 Unit(s) SubCutaneous <User Schedule>  insulin NPH human recombinant 46 Unit(s) SubCutaneous <User Schedule>  lacosamide Solution 200 milliGRAM(s) Oral two times a day  levETIRAcetam  Solution 1000 milliGRAM(s) Oral two times a day  levothyroxine 150 MICROGram(s) Oral daily  metoprolol tartrate 25 milliGRAM(s) Oral every 8 hours  pantoprazole   Suspension 40 milliGRAM(s) Oral daily  predniSONE   Tablet 15 milliGRAM(s) Oral <User Schedule>  predniSONE   Tablet 5 milliGRAM(s) Oral at bedtime  scopolamine 1 mG/72 Hr(s) Patch 1 Patch Transdermal every 72 hours  simvastatin 20 milliGRAM(s) Oral at bedtime    MEDICATIONS  (PRN):  acetaminophen    Suspension .. 650 milliGRAM(s) Oral every 6 hours PRN Temp greater or equal to 38C (100.4F), Mild Pain (1 - 3)  glycopyrrolate Injectable 0.1 milliGRAM(s) IV Push three times a day PRN secretions  oxyCODONE    IR 5 milliGRAM(s) Oral every 6 hours PRN Moderate Pain (4 - 6)  oxyCODONE    IR 10 milliGRAM(s) Oral every 6 hours PRN Severe Pain (7 - 10)  sodium chloride 0.9% lock flush 10 milliLiter(s) IV Push every 1 hour PRN Pre/post blood products, medications, blood draw, and to maintain line patency       LABS:                                 8.1    8.53  )-----------( 344      ( 2020 20:33 )             26.7     -    142  |  104  |  27<H>  ----------------------------<  100<H>  4.0   |  28  |  0.65    Ca    9.0      2020 20:33  Phos  3.2       Mg     2.3                      PHYSICAL EXAM:  Neurological:  intubated, EO spont, does not track, disconjugated gaze, pupils 3mm reactive, +cough/gag, not follow commands, upper extremities tremors when stimulated, LE TF  Cardiovascular: +s1, s2  Respiratory: clear to auscultation b/l  Gastrointestinal: soft, non-distended, non-tender  abd: soft, non-tender, nl bs ,+ skin lesion  Extremities: Warm, dry  + skin tear on buttocks    DIET: Glucerna @65    IMAGING: no new imaging

## 2020-06-23 NOTE — PROGRESS NOTE ADULT - ASSESSMENT
ASSESSMENT:  Left frontal intracerebral hemorrhage, generalized periodic discharges  Continued goals of care conversation, palliative and ethics involved      NEURO:  Neuro q4h checks  seizure management: Keppra and Vimpat  Pain management with Tylenol & fentanyl prn  Given previous psych history and workup not revealing any cause, NMDA encephalitis testing sent -> NMDA AB neg.    Trach and PEG being discussed with family. no decision yet   Activity: [] OOB as tolerated [] Bedrest [X] PT [X] OT [] PMNR      PULM:  Intubated since 5/29- Day 26 of intubation.- awaiting family decision on goals of care - not likely to withstand extubation.   H/O of COPD   CPAP as tolerated. Has failed CPAP before   A lot of oral secretions- added scopolamine patch       CV:  Anasarca- lasix + albumin X1  Keep -160  TTE negative for PFO  HTN: Continue norvasc   sinus tachycardia, increase metoprolol to 25 q6h  Statin for HLD    Check BMP      RENAL:  IVL. Monitor Is/Os  Female iraheta  on cardura for urinary retention      GI:  Diet: On Glucerna OGT feeds  Continue Banana flakes and metamucil , LBM 6/21   GI prophylaxis [] not indicated [X] PO PPI [] other:        ENDO: S/P hyperglycemia - IR DM  Endo Following for adjustment of NPH; will follow up on sugar levels  Continue PO synthroid  Continue prednisone 15mg, 5 mg at bedtime (was on it at home for RA). Likely etiology of bruising  Goal euglycemia (-180)      HEME/ONC: anemia - no CBC  On SQL for DVT ppx- 30mg SQ q12.  VTE prophylaxis: [X] SCDs [X] chemoprophylaxis [X] high risk of DVT/PE on admission due to: bed bound at baseline  LE doppler 5/30- No DVT        ID:  Afebrile   Ertapnem for ESBL found growing in urine culture on 6/4 - ended on  - 6/14      MISC: Family meeting with palliative/ethics/icu on 6/16. Family understands all options are bad--trach/PEG vs. compassionate extubation. No decisions were made despite palliative care follow up after the meeting. Ethics spoke with Heavenly on 6/18--see documentation. No decisions made.       SOCIAL/FAMILY:  [X] updated family via phone  [] family meeting    CODE STATUS:  [X] Full Code     DISPOSITION:  [X] ICU    [X] Patient is at high risk of neurologic deterioration/death due to: resp failure, seizures, hemorrhage. ASSESSMENT:  Left frontal intracerebral hemorrhage, generalized periodic discharges  Continued goals of care conversation, palliative and ethics involved      NEURO:  Neuro q4h checks  seizure management: Keppra and Vimpat  Pain management with Tylenol & fentanyl prn  Given previous psych history and workup not revealing any cause, NMDA encephalitis testing sent -> NMDA AB neg.    Trach and PEG being discussed with family. no decision yet   Activity: [] OOB as tolerated [] Bedrest [X] PT [X] OT [] PMNR      PULM:  Intubated since 5/29- Day 26 of intubation.- awaiting family decision on goals of care - not likely to withstand extubation.   H/O of COPD   CPAP as tolerated.    A lot of oral secretions- added scopolamine patch       CV:  Anasarca- lasix + albumin X1  Keep -160  TTE negative for PFO  HTN: Continue norvasc   sinus tachycardia, increase metoprolol to 25 q6h  Statin for HLD          RENAL:  IVL. Monitor Is/Os  Female iraheta  on cardura for urinary retention      GI:  Diet: On Glucerna OGT feeds  Continue Banana flakes and metamucil , LBM 6/21   GI prophylaxis [] not indicated [X] PO PPI [] other:        ENDO: S/P hyperglycemia - IR DM  Endo Following for adjustment of NPH; will follow up on sugar levels  Continue PO synthroid  Continue prednisone 15mg, 5 mg at bedtime (was on it at home for RA). Likely etiology of bruising  Goal euglycemia (-180)      HEME/ONC: anemia - no CBC  On SQL for DVT ppx- 30mg SQ q12.  VTE prophylaxis: [X] SCDs [X] chemoprophylaxis [X] high risk of DVT/PE on admission due to: bed bound at baseline  LE doppler 5/30- No DVT        ID:  Afebrile   Ertapnem for ESBL found growing in urine culture on 6/4 - ended on  - 6/14      MISC: Family meeting with palliative/ethics/icu on 6/16. Family understands all options are bad--trach/PEG vs. compassionate extubation. No decisions were made despite palliative care follow up after the meeting. Ethics spoke with Heavenly on 6/18--see documentation. No decisions made.       SOCIAL/FAMILY:  [X] updated family via phone  [] family meeting    CODE STATUS:  [X] Full Code     DISPOSITION:  [X] ICU    [X] Patient is at high risk of neurologic deterioration/death due to: resp failure, seizures, hemorrhage.

## 2020-06-23 NOTE — PROGRESS NOTE ADULT - SUBJECTIVE AND OBJECTIVE BOX
NSCU ATTENDING -- ADDITIONAL PROGRESS NOTE    Nighttime rounds were performed -- please refer to earlier Progress Note for HPI details.    T(C): 37.4 (06-23-20 @ 19:00), Max: 37.5 (06-23-20 @ 15:00)  HR: 85 (06-23-20 @ 19:00) (85 - 113)  BP: 137/94 (06-23-20 @ 19:00) (121/76 - 156/86)  RR: 14 (06-23-20 @ 19:00) (13 - 21)  SpO2: 100% (06-23-20 @ 19:00) (98% - 100%)  Wt(kg): --    Relevant labwork and imaging reviewed.    Still pending family decision making.

## 2020-06-23 NOTE — PROGRESS NOTE ADULT - PROBLEM SELECTOR PLAN 2
-Levothyroxine 150mcg per PEG daily  -Hold TF 1 hour prior and after levothyroxine given. Do not give with other medications.    call w/any questions  pager: [phone]   cell: [phone]  office: [phone]    -I spent a total time of 15 mins with the patient at bedside/on unit of which more than 50% of time was spent on counseling/coordination of care.

## 2020-06-23 NOTE — PROGRESS NOTE ADULT - PROBLEM SELECTOR PLAN 1
-test BG Q6h  -c/w 6pm and 12am NPH 10 10 units sq   -Continue NPH 35 units sq q12pm  -continue NPH 46 units sq q6am  -Hold NPH if TF off. Can reduce dose by half if BG less than 100mg/dl  -continue Humalog moderate correction scale Q6h  -Please notify endocrine if any changes in steroid regimen or TF regimen so we can adjust insulin regimen accordingly.

## 2020-06-23 NOTE — PROGRESS NOTE ADULT - SUBJECTIVE AND OBJECTIVE BOX
SUBJECTIVE AND OBJECTIVE:  INTERVAL HPI/OVERNIGHT EVENTS:  Remains unresponsive, poor neuro exam    DNR on chart:   Allergies    Depakote (Other)  Seroquel (Other (Severe))    Intolerances    MEDICATIONS  (STANDING):  amLODIPine   Tablet 10 milliGRAM(s) Oral daily  chlorhexidine 0.12% Liquid 15 milliLiter(s) Oral Mucosa every 12 hours  chlorhexidine 4% Liquid 1 Application(s) Topical <User Schedule>  dextrose 5%. 1000 milliLiter(s) (50 mL/Hr) IV Continuous <Continuous>  dextrose 50% Injectable 25 Gram(s) IV Push once  doxazosin 2 milliGRAM(s) Oral at bedtime  enoxaparin Injectable 30 milliGRAM(s) SubCutaneous two times a day  insulin lispro (HumaLOG) corrective regimen sliding scale   SubCutaneous every 6 hours  insulin NPH human recombinant 10 Unit(s) SubCutaneous <User Schedule>  insulin NPH human recombinant 35 Unit(s) SubCutaneous <User Schedule>  insulin NPH human recombinant 46 Unit(s) SubCutaneous <User Schedule>  lacosamide Solution 200 milliGRAM(s) Oral two times a day  levETIRAcetam  Solution 1000 milliGRAM(s) Oral two times a day  levothyroxine 150 MICROGram(s) Oral daily  metoprolol tartrate 25 milliGRAM(s) Oral every 6 hours  pantoprazole   Suspension 40 milliGRAM(s) Oral daily  predniSONE   Tablet 15 milliGRAM(s) Oral <User Schedule>  predniSONE   Tablet 5 milliGRAM(s) Oral at bedtime  scopolamine 1 mG/72 Hr(s) Patch 1 Patch Transdermal every 72 hours  simvastatin 20 milliGRAM(s) Oral at bedtime    MEDICATIONS  (PRN):  acetaminophen    Suspension .. 650 milliGRAM(s) Oral every 6 hours PRN Temp greater or equal to 38C (100.4F), Mild Pain (1 - 3)  glycopyrrolate Injectable 0.1 milliGRAM(s) IV Push three times a day PRN secretions  oxyCODONE    IR 5 milliGRAM(s) Oral every 6 hours PRN Moderate Pain (4 - 6)  oxyCODONE    IR 10 milliGRAM(s) Oral every 6 hours PRN Severe Pain (7 - 10)  sodium chloride 0.9% lock flush 10 milliLiter(s) IV Push every 1 hour PRN Pre/post blood products, medications, blood draw, and to maintain line patency      ITEMS UNCHECKED ARE NOT PRESENT    PRESENT SYMPTOMS: [ x]Unable to obtain due to poor mentation   Source if other than patient:  [ ]Family   [ ]Team     Pain:  [ ]yes [ ]no  QOL impact -   Location -                    Aggravating factors -  Quality -  Radiation -  Timing-  Severity (0-10 scale):  Minimal acceptable level (0-10 scale):     Dyspnea:                           [ ]Mild [ ]Moderate [ ]Severe  Anxiety:                             [ ]Mild [ ]Moderate [ ]Severe  Fatigue:                             [ ]Mild [ ]Moderate [ ]Severe  Nausea:                             [ ]Mild [ ]Moderate [ ]Severe  Loss of appetite:              [ ]Mild [ ]Moderate [ ]Severe  Constipation:                    [ ]Mild [ ]Moderate [ ]Severe    PAIN AD Score:	0  [email or web address] (Ctrl + left click to view)    Other Symptoms:  [x ]All other review of systems negative     Palliative Performance Status Version 2:       10  %      [email or web address]/[email or web address]_performance_[email or web address]_[email or web address]  PHYSICAL EXAM:  Vital Signs Last 24 Hrs  T(C): 37.3 (23 Jun 2020 11:00), Max: 37.3 (22 Jun 2020 15:00)  T(F): 99.1 (23 Jun 2020 11:00), Max: 99.1 (22 Jun 2020 15:00)  HR: 104 (23 Jun 2020 11:00) (87 - 113)  BP: 129/78 (23 Jun 2020 11:00) (121/76 - 156/86)  BP(mean): 93 (23 Jun 2020 11:00) (90 - 107)  RR: 19 (23 Jun 2020 11:00) (14 - 19)  SpO2: 99% (23 Jun 2020 11:00) (97% - 100%) I&O's Summary    22 Jun 2020 07:01  -  23 Jun 2020 07:00  --------------------------------------------------------  IN: 1850 mL / OUT: 1850 mL / NET: 0 mL    23 Jun 2020 07:01  -  23 Jun 2020 11:14  --------------------------------------------------------  IN: 375 mL / OUT: 0 mL / NET: 375 mL       GENERAL:  [ ]Alert  [ ]Oriented x   [ ]Lethargic  [ ]Cachexia  [ xUnarousable  [ ]Verbal  [ x]Non-Verbal  Behavioral:   [ ]Anxiety  [ ]Delirium [ ]Agitation [ ]Other  HEENT:  [ ]Normal   [ ]Dry mouth   [ x]ET Tube/Trach  [ ]Oral lesions  PULMONARY:   [ ]Clear [ ]Tachypnea  [ ]Audible excessive secretions   [ ]Rhonchi        [ ]Right [ ]Left [ ]Bilateral  [ ]Crackles        [ ]Right [ ]Left [ ]Bilateral  [ ]Wheezing     [ ]Right [ ]Left [ ]Bilateral  [ ]Diminished BS [ ] Right [ ]Left [ ]Bilateral  CARDIOVASCULAR:    [ ]Regular [x ]Irregular [ ]Tachy  [ ]Ubaldo [ ]Murmur [ ]Other  GASTROINTESTINAL:  [x ]Soft  [ ]Distended   [ ]+BS  [ ]Non tender [ ]Tender  [ ]PEG [x ]OGT/ NGT   Last BM:      GENITOURINARY:  [ ]Normal [x ]Incontinent   [ ]Oliguria/Anuria   [ ]Antoine  MUSCULOSKELETAL:   [ ]Normal   [ ]Weakness  [x ]Bed/Wheelchair bound [ ]Edema  NEUROLOGIC:   [ ]No focal deficits  [x ] Cognitive impairment  [ ] Dysphagia [ ]Dysarthria [ ] Paresis [ ]Other   SKIN:   [ ]Normal  [ ]Rash   [ ]Pressure ulcer(s) [ ]y [ ]n present on admission    CRITICAL CARE:  [ ]Shock Present  [ ]Septic [ ]Cardiogenic [x ]Neurologic [ ]Hypovolemic  [ ]Vasopressors [ ]Inotropes  [x ]Respiratory failure present [ x]Mechanical Ventilation [ ]Non-invasive ventilatory support [ ]High-Flow  [x ]Acute  [ ]Chronic [ ]Hypoxic  [ ]Hypercarbic [ ]Other  [ ]Other organ failure     LABS:                        8.1    8.53  )-----------( 344      ( 22 Jun 2020 20:33 )             26.7   06-22    142  |  104  |  27<H>  ----------------------------<  100<H>  4.0   |  28  |  0.65    Ca    9.0      22 Jun 2020 20:33  Phos  3.2     06-22  Mg     2.3     06-22          RADIOLOGY & ADDITIONAL STUDIES:    < from: Xray Chest 1 View- PORTABLE-Routine (06.23.20 @ 05:28) >        INTERPRETATION:  A single chest x-ray was obtained on June 23, 2020.    Indication: Intubated.    Impression:    The heart is normal in size. Platelike atelectasis left lower lobe. The right lung is clear. Endotracheal tube is just above the corwin and should be repositioned slightly. A central line is seen on the right and the tip is in superior vena cava. No pneumothorax.      < end of copied text >      Protein Calorie Malnutrition Present: [ ]mild [ ]moderate [ ]severe [ ]underweight [ ]morbid obesity  https://www.andeal.org/vault/2440/web/files/ONC/Table_Clinical%20Characteristics%20to%20Document%20Malnutrition-White%20JV%20et%20al%190162.pdf    Height (cm): 162.6 (05-30-20 @ 00:49), 152.4 (05-29-20 @ 19:18), 154.9 (05-12-20 @ 21:48)  Weight (kg): 99.6 (05-30-20 @ 00:49), 136.1 (05-29-20 @ 19:18), 98.2 (05-12-20 @ 23:57)  BMI (kg/m2): 37.7 (05-30-20 @ 00:49), 51.5 (05-30-20 @ 00:49), 58.6 (05-29-20 @ 19:18)    [ ]PPSV2 < or = 30%  [ ]significant weight loss [x ]poor nutritional intake [ ]anasarca   Albumin, Serum: 2.7 g/dL (05-29-20 @ 20:22)   [ ]Artificial Nutrition    REFERRALS:   [ ]Chaplaincy  [ ]Hospice  [ ]Child Life  [x ]Social Work  [ ]Case management [ ]Holistic Therapy   (x) ETHICS     Goals of Care Document:

## 2020-06-23 NOTE — PROGRESS NOTE ADULT - SUBJECTIVE AND OBJECTIVE BOX
Diabetes Follow up note:    Chief complaint: T2DM w/steroid induced hyperglycemia    Interval Hx: Pt did not receive 12am NPH dose w/subsequent hyperglycemia in 200s today. Remains at goal on continuous tube feeds and awaiting family decision re PEG/Trach.     Review of Systems:  unresponsive.     MEDS:  insulin lispro (HumaLOG) corrective regimen sliding scale   SubCutaneous every 6 hours  insulin NPH human recombinant 10 Unit(s) SubCutaneous <User Schedule>  insulin NPH human recombinant 35 Unit(s) SubCutaneous <User Schedule>  insulin NPH human recombinant 46 Unit(s) SubCutaneous <User Schedule>  levothyroxine 150 MICROGram(s) Oral daily  predniSONE   Tablet 15 milliGRAM(s) Oral <User Schedule>  predniSONE   Tablet 5 milliGRAM(s) Oral at bedtime  simvastatin 20 milliGRAM(s) Oral at bedtime      Allergies    Depakote (Other)  Seroquel (Other (Severe))        PE:  General: Female lying in bed. Intubated  Vital Signs Last 24 Hrs  T(C): 37.3 (23 Jun 2020 11:00), Max: 37.3 (22 Jun 2020 15:00)  T(F): 99.1 (23 Jun 2020 11:00), Max: 99.1 (22 Jun 2020 15:00)  HR: 93 (23 Jun 2020 14:00) (87 - 113)  BP: 128/74 (23 Jun 2020 14:00) (121/76 - 156/86)  BP(mean): 91 (23 Jun 2020 14:00) (90 - 107)  RR: 21 (23 Jun 2020 14:00) (13 - 21)  SpO2: 100% (23 Jun 2020 14:00) (98% - 100%)  HEENT: ETT in place. On mechanical vent.   Abd: Soft, NT,ND, Obese.   Extremities: Warm  Neuro: unresponsive.     LABS:  POCT Blood Glucose.: 249 mg/dL (06-23-20 @ 11:15)  POCT Blood Glucose.: 209 mg/dL (06-23-20 @ 05:11)  POCT Blood Glucose.: 113 mg/dL (06-22-20 @ 23:01)  POCT Blood Glucose.: 118 mg/dL (06-22-20 @ 17:11)  POCT Blood Glucose.: 211 mg/dL (06-22-20 @ 11:54)  POCT Blood Glucose.: 146 mg/dL (06-22-20 @ 05:09)  POCT Blood Glucose.: 82 mg/dL (06-21-20 @ 22:52)  POCT Blood Glucose.: 101 mg/dL (06-21-20 @ 17:03)  POCT Blood Glucose.: 143 mg/dL (06-21-20 @ 11:54)  POCT Blood Glucose.: 138 mg/dL (06-21-20 @ 05:19)  POCT Blood Glucose.: 110 mg/dL (06-20-20 @ 23:41)  POCT Blood Glucose.: 109 mg/dL (06-20-20 @ 17:50)                            8.1    8.53  )-----------( 344      ( 22 Jun 2020 20:33 )             26.7       06-22    142  |  104  |  27<H>  ----------------------------<  100<H>  4.0   |  28  |  0.65    Ca    9.0      22 Jun 2020 20:33  Phos  3.2     06-22  Mg     2.3     06-22        Thyroid Function Tests:  06-11 @ 13:58 TSH 8.26 FreeT4 -- T3 55 Anti TPO -- Anti Thyroglobulin Ab -- TSI --  06-04 @ 01:58 TSH -- FreeT4 2.7 T3 -- Anti TPO -- Anti Thyroglobulin Ab -- TSI --      A1C with Estimated Average Glucose Result: 7.4 % (05-30-20 @ 04:43)  A1C with Estimated Average Glucose Result: 6.4 % (05-14-20 @ 16:23)  A1C with Estimated Average Glucose Result: 6.5 % (05-13-20 @ 08:24)  Hemoglobin A1C, Whole Blood: 9.0 % (01-28-20 @ 19:30)  Hemoglobin A1C, Whole Blood: 9.2 % (01-27-20 @ 17:36)          Contact number: bhavna [phone] or [phone]

## 2020-06-24 LAB
GLUCOSE BLDC GLUCOMTR-MCNC: 149 MG/DL — HIGH (ref 70–99)
GLUCOSE BLDC GLUCOMTR-MCNC: 188 MG/DL — HIGH (ref 70–99)
GLUCOSE BLDC GLUCOMTR-MCNC: 218 MG/DL — HIGH (ref 70–99)
GLUCOSE BLDC GLUCOMTR-MCNC: 283 MG/DL — HIGH (ref 70–99)
GLUCOSE BLDC GLUCOMTR-MCNC: 296 MG/DL — HIGH (ref 70–99)

## 2020-06-24 PROCEDURE — 99231 SBSQ HOSP IP/OBS SF/LOW 25: CPT

## 2020-06-24 PROCEDURE — 99291 CRITICAL CARE FIRST HOUR: CPT

## 2020-06-24 RX ORDER — METOPROLOL TARTRATE 50 MG
25 TABLET ORAL EVERY 6 HOURS
Refills: 0 | Status: DISCONTINUED | OUTPATIENT
Start: 2020-06-24 | End: 2020-07-11

## 2020-06-24 RX ORDER — FUROSEMIDE 40 MG
10 TABLET ORAL ONCE
Refills: 0 | Status: COMPLETED | OUTPATIENT
Start: 2020-06-24 | End: 2020-06-24

## 2020-06-24 RX ADMIN — LEVETIRACETAM 1000 MILLIGRAM(S): 250 TABLET, FILM COATED ORAL at 05:21

## 2020-06-24 RX ADMIN — Medication 40 MILLIGRAM(S): at 09:18

## 2020-06-24 RX ADMIN — ROBINUL 0.1 MILLIGRAM(S): 0.2 INJECTION INTRAMUSCULAR; INTRAVENOUS at 13:36

## 2020-06-24 RX ADMIN — SCOPALAMINE 1 PATCH: 1 PATCH, EXTENDED RELEASE TRANSDERMAL at 01:00

## 2020-06-24 RX ADMIN — Medication 25 MILLIGRAM(S): at 11:59

## 2020-06-24 RX ADMIN — Medication 25 MILLIGRAM(S): at 17:29

## 2020-06-24 RX ADMIN — LACOSAMIDE 200 MILLIGRAM(S): 50 TABLET ORAL at 05:21

## 2020-06-24 RX ADMIN — ENOXAPARIN SODIUM 30 MILLIGRAM(S): 100 INJECTION SUBCUTANEOUS at 05:20

## 2020-06-24 RX ADMIN — HUMAN INSULIN 46 UNIT(S): 100 INJECTION, SUSPENSION SUBCUTANEOUS at 05:23

## 2020-06-24 RX ADMIN — Medication 15 MILLIGRAM(S): at 05:21

## 2020-06-24 RX ADMIN — OXYCODONE HYDROCHLORIDE 10 MILLIGRAM(S): 5 TABLET ORAL at 11:07

## 2020-06-24 RX ADMIN — Medication 25 MILLIGRAM(S): at 05:22

## 2020-06-24 RX ADMIN — LACOSAMIDE 200 MILLIGRAM(S): 50 TABLET ORAL at 17:23

## 2020-06-24 RX ADMIN — Medication 25 MILLIGRAM(S): at 23:59

## 2020-06-24 RX ADMIN — SCOPALAMINE 1 PATCH: 1 PATCH, EXTENDED RELEASE TRANSDERMAL at 20:16

## 2020-06-24 RX ADMIN — CHLORHEXIDINE GLUCONATE 15 MILLILITER(S): 213 SOLUTION TOPICAL at 05:23

## 2020-06-24 RX ADMIN — HUMAN INSULIN 10 UNIT(S): 100 INJECTION, SUSPENSION SUBCUTANEOUS at 23:58

## 2020-06-24 RX ADMIN — ENOXAPARIN SODIUM 30 MILLIGRAM(S): 100 INJECTION SUBCUTANEOUS at 17:29

## 2020-06-24 RX ADMIN — HUMAN INSULIN 10 UNIT(S): 100 INJECTION, SUSPENSION SUBCUTANEOUS at 17:21

## 2020-06-24 RX ADMIN — SIMVASTATIN 20 MILLIGRAM(S): 20 TABLET, FILM COATED ORAL at 20:18

## 2020-06-24 RX ADMIN — Medication 5 MILLIGRAM(S): at 20:18

## 2020-06-24 RX ADMIN — OXYCODONE HYDROCHLORIDE 10 MILLIGRAM(S): 5 TABLET ORAL at 05:30

## 2020-06-24 RX ADMIN — CHLORHEXIDINE GLUCONATE 15 MILLILITER(S): 213 SOLUTION TOPICAL at 17:29

## 2020-06-24 RX ADMIN — Medication 2 MILLIGRAM(S): at 20:18

## 2020-06-24 RX ADMIN — OXYCODONE HYDROCHLORIDE 10 MILLIGRAM(S): 5 TABLET ORAL at 22:16

## 2020-06-24 RX ADMIN — CHLORHEXIDINE GLUCONATE 1 APPLICATION(S): 213 SOLUTION TOPICAL at 23:12

## 2020-06-24 RX ADMIN — Medication 2: at 12:00

## 2020-06-24 RX ADMIN — HUMAN INSULIN 35 UNIT(S): 100 INJECTION, SUSPENSION SUBCUTANEOUS at 12:00

## 2020-06-24 RX ADMIN — Medication 6: at 17:21

## 2020-06-24 RX ADMIN — PANTOPRAZOLE SODIUM 40 MILLIGRAM(S): 20 TABLET, DELAYED RELEASE ORAL at 11:59

## 2020-06-24 RX ADMIN — AMLODIPINE BESYLATE 10 MILLIGRAM(S): 2.5 TABLET ORAL at 05:22

## 2020-06-24 RX ADMIN — SCOPALAMINE 1 PATCH: 1 PATCH, EXTENDED RELEASE TRANSDERMAL at 07:39

## 2020-06-24 RX ADMIN — LEVETIRACETAM 1000 MILLIGRAM(S): 250 TABLET, FILM COATED ORAL at 17:23

## 2020-06-24 RX ADMIN — Medication 4: at 23:58

## 2020-06-24 RX ADMIN — Medication 10 MILLIGRAM(S): at 09:17

## 2020-06-24 RX ADMIN — Medication 150 MICROGRAM(S): at 05:22

## 2020-06-24 NOTE — PROGRESS NOTE ADULT - ASSESSMENT
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    Trach and PEG being discussed with family. no decision yet   Activity: [] OOB as tolerated [] Bedrest [X] PT [X] OT [] PMNR      PULM:  Intubated since 5/29- Day 27 of intubation.- awaiting family decision on goals of care - not likely to withstand extubation.   H/O of COPD   CPAP as tolerated.    A lot of oral secretions- scopolamine patch       CV:  Keep -160  HTN: Continue norvasc   sinus tachycardia, metoprolol to 25 q6h  Statin for HLD        RENAL:  IVL. Monitor Is/Os  Female iraheta  on cardura for urinary retention      GI:  Diet: On Glucerna OGT feeds  Continue Banana flakes and metamucil , LBM 6/23  GI prophylaxis [] not indicated [X] PO PPI [] other:      ENDO: S/P hyperglycemia - IR DM  Endo Following for adjustment of NPH; will follow up on sugar levels  Continue PO synthroid  Continue prednisone 15mg, 5 mg at bedtime (was on it at home for RA). Likely etiology of bruising  Goal euglycemia (-180)      HEME/ONC:   On SQL for DVT ppx- 30mg SQ q12.  VTE prophylaxis: [X] SCDs [X] chemoprophylaxis [X] high risk of DVT/PE on admission due to: bed bound at baseline  LE doppler 5/30- No DVT    ID:  Afebrile   Ertapnem for ESBL found growing in urine culture on 6/4 - ended on  - 6/14    MISC: Burdick to call unit today to tell Dr. French regarding discussion.     SOCIAL/FAMILY:  [X] updated family via phone  [] family meeting    CODE STATUS:  [X] Full Code     DISPOSITION:  [X] ICU    [X] Patient is at high risk of neurologic deterioration/death due to: resp failure, seizures, hemorrhage. 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    Trach and PEG being discussed with family. no decision yet   Activity: [] OOB as tolerated [] Bedrest [X] PT [X] OT [] PMNR      PULM:  Intubated since 5/29- Day 27 of intubation.- awaiting family decision on goals of care - not likely to withstand extubation.  Meet with family goals of care , if compassionate extubation- solumedrol, glycopyrraltae  prior    H/O of COPD   CPAP as tolerated.    A lot of oral secretions- scopolamine patch       CV:  Keep -160  Anasarca- [id number]  HTN: Continue norvasc   sinus tachycardia, metoprolol to 25 q6h  Statin for HLD        RENAL:  IVL. Monitor Is/Os  Female iraheta  on cardura for urinary retention      GI:  Diet: On Glucerna OGT feeds---> NPO  Continue Banana flakes and metamucil , LBM 6/23  GI prophylaxis [] not indicated [X] PO PPI [] other:      ENDO: S/P hyperglycemia - IR DM  Endo Following for adjustment of NPH; will follow up on sugar levels  Continue PO synthroid  Continue prednisone 15mg, 5 mg at bedtime (was on it at home for RA). Likely etiology of bruising  Goal euglycemia (-180)      HEME/ONC:   On SQL for DVT ppx- 30mg SQ q12.  VTE prophylaxis: [X] SCDs [X] chemoprophylaxis [X] high risk of DVT/PE on admission due to: bed bound at baseline  LE doppler 5/30- No DVT    ID:  Afebrile   Ertapnem for ESBL found growing in urine culture on 6/4 - ended on  - 6/14    MISC: Burdick to call unit today to tell Dr. French regarding discussion.     SOCIAL/FAMILY:  [X] updated family via phone  [] family meeting    CODE STATUS:  [X] Full Code     DISPOSITION:  [X] ICU    [X] Patient is at high risk of neurologic deterioration/death due to: resp failure, seizures, hemorrhage.

## 2020-06-24 NOTE — PROGRESS NOTE ADULT - PROBLEM SELECTOR PLAN 1
-test BG Q6h  -HOLD NPH WHILE TF OFF.   -While TF infusing:   c/w 6pm and 12am NPH 10 10 units sq   -Continue NPH 35 units sq q12pm  -continue NPH 46 units sq q6am  -continue Humalog moderate correction scale Q6h  -Please notify endocrine if any changes in steroid regimen or TF regimen so we can adjust insulin regimen accordingly.

## 2020-06-24 NOTE — CHART NOTE - NSCHARTNOTEFT_GEN_A_CORE
Patients daughter Heavenly once again contacted by NSCU attending Dr. Lovett and continues to state no decisions have been made regarding trach/peg vs transfer to PCU .    Dr Lovett advised Burdick that ongoing ET tube will cause irreparable harm and possibly death.  Palliative will continue to follow,  Ethics on board as well.

## 2020-06-24 NOTE — PROGRESS NOTE ADULT - PROBLEM SELECTOR PLAN 2
-Levothyroxine 150mcg per PEG daily  -Hold TF 1 hour prior and after levothyroxine given. Do not give with other medications.    discussed w/NSCU team  pager: [phone]   cell: [phone]  office: [phone]    -I spent a total time of 15 mins with the patient at bedside/on unit of which more than 50% of time was spent on counseling/coordination of care.

## 2020-06-24 NOTE — PROGRESS NOTE ADULT - SUBJECTIVE AND OBJECTIVE BOX
Diabetes Follow up note:    Chief complaint: T2DM/steroid induced hyperglycemia    Interval Hx: BG values at goal. Given half of NPH dose at noon today as TF were off for CPAP/vent weaning. Currently NPO with increased secretions per RN. Remains unresponsive.     Review of Systems:  unable to provide ROS.     MEDS:  insulin lispro (HumaLOG) corrective regimen sliding scale   SubCutaneous every 6 hours  insulin NPH human recombinant 10 Unit(s) SubCutaneous <User Schedule>  insulin NPH human recombinant 35 Unit(s) SubCutaneous <User Schedule>  insulin NPH human recombinant 46 Unit(s) SubCutaneous <User Schedule>  levothyroxine 150 MICROGram(s) Oral daily  predniSONE   Tablet 15 milliGRAM(s) Oral <User Schedule>  predniSONE   Tablet 5 milliGRAM(s) Oral at bedtime  simvastatin 20 milliGRAM(s) Oral at bedtime      Allergies    Depakote (Other)  Seroquel (Other (Severe))      PE:  General: Female lying in bed. NAD>   Vital Signs Last 24 Hrs  T(C): 37.3 (24 Jun 2020 11:00), Max: 37.7 (24 Jun 2020 07:00)  T(F): 99.2 (24 Jun 2020 11:00), Max: 99.9 (24 Jun 2020 07:00)  HR: 92 (24 Jun 2020 14:00) (85 - 112)  BP: 139/83 (24 Jun 2020 13:00) (119/74 - 166/93)  BP(mean): 98 (24 Jun 2020 13:00) (89 - 111)  RR: 14 (24 Jun 2020 14:00) (14 - 20)  SpO2: 100% (24 Jun 2020 14:00) (98% - 100%)  HEENT: ETT in place. On vent.   Abd: Soft, NT,ND, Obese.   Extremities: Warm  Neuro: unresponsive    LABS:  POCT Blood Glucose.: 188 mg/dL (06-24-20 @ 11:48)  POCT Blood Glucose.: 149 mg/dL (06-24-20 @ 05:17)  POCT Blood Glucose.: 128 mg/dL (06-23-20 @ 23:01)  POCT Blood Glucose.: 145 mg/dL (06-23-20 @ 17:02)  POCT Blood Glucose.: 249 mg/dL (06-23-20 @ 11:15)  POCT Blood Glucose.: 209 mg/dL (06-23-20 @ 05:11)  POCT Blood Glucose.: 113 mg/dL (06-22-20 @ 23:01)  POCT Blood Glucose.: 118 mg/dL (06-22-20 @ 17:11)  POCT Blood Glucose.: 211 mg/dL (06-22-20 @ 11:54)  POCT Blood Glucose.: 146 mg/dL (06-22-20 @ 05:09)  POCT Blood Glucose.: 82 mg/dL (06-21-20 @ 22:52)  POCT Blood Glucose.: 101 mg/dL (06-21-20 @ 17:03)                            8.1    8.53  )-----------( 344      ( 22 Jun 2020 20:33 )             26.7       06-22    142  |  104  |  27<H>  ----------------------------<  100<H>  4.0   |  28  |  0.65    Ca    9.0      22 Jun 2020 20:33  Phos  3.2     06-22  Mg     2.3     06-22        Thyroid Function Tests:  06-11 @ 13:58 TSH 8.26 FreeT4 -- T3 55 Anti TPO -- Anti Thyroglobulin Ab -- TSI --  06-04 @ 01:58 TSH -- FreeT4 2.7 T3 -- Anti TPO -- Anti Thyroglobulin Ab -- TSI --      A1C with Estimated Average Glucose Result: 7.4 % (05-30-20 @ 04:43)  A1C with Estimated Average Glucose Result: 6.4 % (05-14-20 @ 16:23)  A1C with Estimated Average Glucose Result: 6.5 % (05-13-20 @ 08:24)  Hemoglobin A1C, Whole Blood: 9.0 % (01-28-20 @ 19:30)  Hemoglobin A1C, Whole Blood: 9.2 % (01-27-20 @ 17:36)          Contact number: bhavna [phone] or [phone]

## 2020-06-24 NOTE — CHART NOTE - NSCHARTNOTEFT_GEN_A_CORE
Nutrition Follow Up Note     Patient seen for: nutrition follow up on ICU    Source: patient, medical record, communication with team.     Chart reviewed, events noted. Pt is a 63 yo F with uncontrolled T2DM with no known complications, hypothyroidism, HTN, COPD, RA on chronic steroids. Presented with mental status changes associated with shaking; found to have a left frontal parenchymal hematoma with mass effect on the left lateral ventricle and intraventricular extension with blood layering in the occipital horns s/p intubation. Pt continues to be followed by palliative care; goals of care discussions ongoing (re: trach/PEG). Poor neurological exam noted.    Diet Order: Diet, NPO with Tube Feed:   Tube Feeding Modality: Orogastric  Glucerna 1.2 Dewey (GLUCERNARTH)  Total Volume for 24 Hours (mL): 1560  Continuous  Starting Tube Feed Rate {mL per Hour}: 20  Increase Tube Feed Rate by (mL): 10     Every 4 hours  Until Goal Tube Feed Rate (mL per Hour): 65  Tube Feed Duration (in Hours): 24  Tube Feed Start Time: 23:00 (06-02-20 @ 11:06)    CURRENT EN ORDER PROVIDES: 1560ml, 1872kcal and 94g protein.     EN provision (per nursing flow sheet):   (6/24): 455ml (thus far)  (6/23): 1560ml (100% of goal)  (6/22): 1560ml (100% of goal)  (6/21): 1560ml (100% of goal)    Nutrition Events:   -Pt remains on EN feeds as primary source of nutrition/hydration. No overt s/s of intolerance noted.   -Continues to receive banana flakes as ordered. Last BM: (6/22): x 2; (6/21): x 1.   -Endo following for episodes of hyperglycemia; hx of DM. Continues on NPH, Humulin as ordered. On Prednisone as ordered, which may influence FSBG.   -On synthroid as ordered, with instructions to hold EN feeds 1 hours before/after administration.     Anthropometric Measurements:   Height (cm): 162.6 (05-30-20 @ 00:49), 152.4 (05-29-20 @ 19:18), 154.9 (05-12-20 @ 21:48)  Weight (kg): 99.6 (05-30-20 @ 00:49), 98.2 (05-12-20 @ 23:57); current wt pending.  BMI (kg/m2): 37.7 (05-30-20 @ 00:49), 40.9 (05-12-20 @ 23:57)    Medications: MEDICATIONS  (STANDING):  amLODIPine   Tablet 10 milliGRAM(s) Oral daily  chlorhexidine 0.12% Liquid 15 milliLiter(s) Oral Mucosa every 12 hours  chlorhexidine 4% Liquid 1 Application(s) Topical <User Schedule>  dextrose 5%. 1000 milliLiter(s) (50 mL/Hr) IV Continuous <Continuous>  dextrose 50% Injectable 25 Gram(s) IV Push once  doxazosin 2 milliGRAM(s) Oral at bedtime  enoxaparin Injectable 30 milliGRAM(s) SubCutaneous two times a day  insulin lispro (HumaLOG) corrective regimen sliding scale   SubCutaneous every 6 hours  insulin NPH human recombinant 10 Unit(s) SubCutaneous <User Schedule>  insulin NPH human recombinant 35 Unit(s) SubCutaneous <User Schedule>  insulin NPH human recombinant 46 Unit(s) SubCutaneous <User Schedule>  lacosamide Solution 200 milliGRAM(s) Oral two times a day  levETIRAcetam  Solution 1000 milliGRAM(s) Oral two times a day  levothyroxine 150 MICROGram(s) Oral daily  metoprolol tartrate 25 milliGRAM(s) Oral every 6 hours  pantoprazole   Suspension 40 milliGRAM(s) Oral daily  predniSONE   Tablet 15 milliGRAM(s) Oral <User Schedule>  predniSONE   Tablet 5 milliGRAM(s) Oral at bedtime  scopolamine 1 mG/72 Hr(s) Patch 1 Patch Transdermal every 72 hours  simvastatin 20 milliGRAM(s) Oral at bedtime    MEDICATIONS  (PRN):  acetaminophen    Suspension .. 650 milliGRAM(s) Oral every 6 hours PRN Temp greater or equal to 38C (100.4F), Mild Pain (1 - 3)  glycopyrrolate Injectable 0.1 milliGRAM(s) IV Push three times a day PRN secretions  oxyCODONE    IR 5 milliGRAM(s) Oral every 6 hours PRN Moderate Pain (4 - 6)  oxyCODONE    IR 10 milliGRAM(s) Oral every 6 hours PRN Severe Pain (7 - 10)  sodium chloride 0.9% lock flush 10 milliLiter(s) IV Push every 1 hour PRN Pre/post blood products, medications, blood draw, and to maintain line patency    Labs:   (6/22): BUN 27, Glu 100    POCT Blood Glucose.: 149 mg/dL (06-24-20 @ 05:17)  POCT Blood Glucose.: 128 mg/dL (06-23-20 @ 23:01)  POCT Blood Glucose.: 145 mg/dL (06-23-20 @ 17:02)  POCT Blood Glucose.: 249 mg/dL (06-23-20 @ 11:15)    Skin per nursing documentation: no pressure injuries documented; left buttock fold, medial lower abdomen healing wound; right anterior ankle scab   Edema: 1+ generalized;  left hand;  right hand    Estimated Needs: based on upper IBW 60 kg, with consideration for intubation and BMI>30  Energy: 9601-1058 calories (30-35 dewey/kg)  Protein: 84-96 grams (1.4-1.6 gm/kg)  Yang State Equation (REE): 1721 calories    Previous Nutrition Diagnosis: Increased Nutrient Needs   Nutrition Diagnosis is: Remains appropriate, with EN feeds infusing at goal rate    New Nutrition Diagnosis: none     Recommended Interventions:   1. Continue Glucerna 1.2 @ 65 ml/hr x 24 hours to provide 1560 ml formula, 1872 calories (31 dewey/kg), 94 grams protein (1.6 gm/kg), based on upper IBW 60 kg  2. Consider changing po synthroid to IV; EN currently held 1 hour before/after oral synthroid dose  3. Monitor BMs and ongoing need for Banatrol    Monitoring and Evaluation:   Continue to monitor nutrition provision and tolerance, weights, labs, skin integrity.   RD remains available upon request and will follow up per protocol.    Alison Kleiner RD, [facility] ([phone])

## 2020-06-24 NOTE — PROGRESS NOTE ADULT - SUBJECTIVE AND OBJECTIVE BOX
NSCU ATTENDING -- ADDITIONAL PROGRESS NOTE    Nighttime rounds were performed -- please refer to earlier Progress Note for HPI details.    T(C): 36.8 (06-24-20 @ 20:00), Max: 37.7 (06-24-20 @ 07:00)  HR: 104 (06-24-20 @ 20:00) (88 - 112)  BP: 136/120 (06-24-20 @ 20:00) (119/74 - 166/93)  RR: 24 (06-24-20 @ 20:00) (14 - 24)  SpO2: 96% (06-24-20 @ 20:00) (96% - 100%)  Wt(kg): --    Relevant labwork and imaging reviewed.    Still remaining family decision making.  Long overdue. NSCU ATTENDING -- ADDITIONAL PROGRESS NOTE    Nighttime rounds were performed -- please refer to earlier Progress Note for HPI details.    T(C): 36.8 (06-24-20 @ 20:00), Max: 37.7 (06-24-20 @ 07:00)  HR: 104 (06-24-20 @ 20:00) (88 - 112)  BP: 136/120 (06-24-20 @ 20:00) (119/74 - 166/93)  RR: 24 (06-24-20 @ 20:00) (14 - 24)  SpO2: 96% (06-24-20 @ 20:00) (96% - 100%)  Wt(kg): --    Relevant labwork and imaging reviewed.    Still awaiting family decision making.  Long overdue.

## 2020-06-24 NOTE — PROGRESS NOTE ADULT - SUBJECTIVE AND OBJECTIVE BOX
SUMMARY: 65 yo F with past medical hx of hypothyroidism, HTN, IDDM, COPD/CHRIS on home O2, RA (on Prednisone), SBO s/p ex lap with lysis of adhesions c/b evisceration,  recent hospitalization in 3/2020 with encephalopathy, significantly hypothyroid (, without myxedema coma), small (stable) right sided SDH, UTI, catatonic reaction to VPA (now resolved) and DKA and psychiatric issues, 5/2020 hospitalization for GIB, Emphysematous cystitis, endometritis, Sigmoid colitis, morbid obesity, functional quadriplegia presented on 5/30 with AMS and was found to have L frontal IPH. Per report, patient seized at home prior to presentation, but has not noted to be seizing during the hospitalization. Continued Goals of care discussion with family; palliative and ethics involved    HCP: Norris Rush, Son [phone]; Daughter, Heavenly Schmidt [phone]; son asks that only him or his sister, Heavenly be contacted for patient updates/information as they are both proxy's for patient (29 May 2020 23:41)    ADMISSION SCORES:  GCS: 5 [E2 VNT M3]  ICH: 2    OVERNIGHT EVENTS: none    ICU Vital Signs Last 24 Hrs  T(C): 37.3 (24 Jun 2020 03:00), Max: 37.6 (23 Jun 2020 23:00)  T(F): 99.1 (24 Jun 2020 03:00), Max: 99.6 (23 Jun 2020 23:00)  HR: 95 (24 Jun 2020 05:30) (85 - 108)  BP: 139/82 (24 Jun 2020 05:30) (119/74 - 147/95)  BP(mean): 101 (24 Jun 2020 05:30) (89 - 110)  ABP: --  ABP(mean): --  RR: 15 (24 Jun 2020 05:30) (13 - 21)  SpO2: 100% (24 Jun 2020 05:30) (98% - 100%)    I&O's Detail    23 Jun 2020 07:01  -  24 Jun 2020 07:00  --------------------------------------------------------  IN:    Enteral Tube Flush: 290 mL    Glucerna: 1495 mL  Total IN: 1785 mL    OUT:    Intermittent Catheterization - Urethral: 900 mL  Total OUT: 900 mL    Total NET: 885 mL    MEDICATIONS  (STANDING):  amLODIPine   Tablet 10 milliGRAM(s) Oral daily  chlorhexidine 0.12% Liquid 15 milliLiter(s) Oral Mucosa every 12 hours  chlorhexidine 4% Liquid 1 Application(s) Topical <User Schedule>  dextrose 5%. 1000 milliLiter(s) (50 mL/Hr) IV Continuous <Continuous>  dextrose 50% Injectable 25 Gram(s) IV Push once  doxazosin 2 milliGRAM(s) Oral at bedtime  enoxaparin Injectable 30 milliGRAM(s) SubCutaneous two times a day  insulin lispro (HumaLOG) corrective regimen sliding scale   SubCutaneous every 6 hours  insulin NPH human recombinant 10 Unit(s) SubCutaneous <User Schedule>  insulin NPH human recombinant 35 Unit(s) SubCutaneous <User Schedule>  insulin NPH human recombinant 46 Unit(s) SubCutaneous <User Schedule>  lacosamide Solution 200 milliGRAM(s) Oral two times a day  levETIRAcetam  Solution 1000 milliGRAM(s) Oral two times a day  levothyroxine 150 MICROGram(s) Oral daily  metoprolol tartrate 25 milliGRAM(s) Oral every 6 hours  pantoprazole   Suspension 40 milliGRAM(s) Oral daily  predniSONE   Tablet 15 milliGRAM(s) Oral <User Schedule>  predniSONE   Tablet 5 milliGRAM(s) Oral at bedtime  scopolamine 1 mG/72 Hr(s) Patch 1 Patch Transdermal every 72 hours  simvastatin 20 milliGRAM(s) Oral at bedtime    MEDICATIONS  (PRN):  acetaminophen    Suspension .. 650 milliGRAM(s) Oral every 6 hours PRN Temp greater or equal to 38C (100.4F), Mild Pain (1 - 3)  glycopyrrolate Injectable 0.1 milliGRAM(s) IV Push three times a day PRN secretions  oxyCODONE    IR 5 milliGRAM(s) Oral every 6 hours PRN Moderate Pain (4 - 6)  oxyCODONE    IR 10 milliGRAM(s) Oral every 6 hours PRN Severe Pain (7 - 10)  sodium chloride 0.9% lock flush 10 milliLiter(s) IV Push every 1 hour PRN Pre/post blood products, medications, blood draw, and to maintain line patency         LABS:                                 8.1    8.53  )-----------( 344      ( 22 Jun 2020 20:33 )             26.7     06-22    142  |  104  |  27<H>  ----------------------------<  100<H>  4.0   |  28  |  0.65    Ca    9.0      22 Jun 2020 20:33  Phos  3.2     06-22  Mg     2.3     06-22                   PHYSICAL EXAM:  Neurological:  intubated, EO spont, does not track, disconjugated gaze, pupils 3mm reactive, +cough/gag, not follow commands, upper extremities tremors when stimulated, LE TF  Cardiovascular: +s1, s2  Respiratory: clear to auscultation b/l  Gastrointestinal: soft, non-distended, non-tender  abd: soft, non-tender, nl bs ,+ skin lesion  Extremities: Warm, dry  + skin tear on buttocks    DIET: Glucerna @65    IMAGING: no new imaging SUMMARY: 63 yo F with past medical hx of hypothyroidism, HTN, IDDM, COPD/CHRIS on home O2, RA (on Prednisone), SBO s/p ex lap with lysis of adhesions c/b evisceration,  recent hospitalization in 3/2020 with encephalopathy, significantly hypothyroid (, without myxedema coma), small (stable) right sided SDH, UTI, catatonic reaction to VPA (now resolved) and DKA and psychiatric issues, 5/2020 hospitalization for GIB, Emphysematous cystitis, endometritis, Sigmoid colitis, morbid obesity, functional quadriplegia presented on 5/30 with AMS and was found to have L frontal IPH. Per report, patient seized at home prior to presentation, but has not noted to be seizing during the hospitalization. Continued Goals of care discussion with family; palliative and ethics involved    HCP: Norris Rush, Son [phone]; Daughter, Heavenly Schmidt [phone]; son asks that only him or his sister, Heavenly be contacted for patient updates/information as they are both proxy's for patient (29 May 2020 23:41)    ADMISSION SCORES:  GCS: 5 [E2 VNT M3]  ICH: 2    OVERNIGHT EVENTS: none. More awake this AM.    ICU Vital Signs Last 24 Hrs  T(C): 37.3 (24 Jun 2020 03:00), Max: 37.6 (23 Jun 2020 23:00)  T(F): 99.1 (24 Jun 2020 03:00), Max: 99.6 (23 Jun 2020 23:00)  HR: 95 (24 Jun 2020 05:30) (85 - 108)  BP: 139/82 (24 Jun 2020 05:30) (119/74 - 147/95)  BP(mean): 101 (24 Jun 2020 05:30) (89 - 110)  ABP: --  ABP(mean): --  RR: 15 (24 Jun 2020 05:30) (13 - 21)  SpO2: 100% (24 Jun 2020 05:30) (98% - 100%)    I&O's Detail    23 Jun 2020 07:01  -  24 Jun 2020 07:00  --------------------------------------------------------  IN:    Enteral Tube Flush: 290 mL    Glucerna: 1495 mL  Total IN: 1785 mL    OUT:    Intermittent Catheterization - Urethral: 900 mL  Total OUT: 900 mL    Total NET: 885 mL    MEDICATIONS  (STANDING):  amLODIPine   Tablet 10 milliGRAM(s) Oral daily  chlorhexidine 0.12% Liquid 15 milliLiter(s) Oral Mucosa every 12 hours  chlorhexidine 4% Liquid 1 Application(s) Topical <User Schedule>  dextrose 5%. 1000 milliLiter(s) (50 mL/Hr) IV Continuous <Continuous>  dextrose 50% Injectable 25 Gram(s) IV Push once  doxazosin 2 milliGRAM(s) Oral at bedtime  enoxaparin Injectable 30 milliGRAM(s) SubCutaneous two times a day  insulin lispro (HumaLOG) corrective regimen sliding scale   SubCutaneous every 6 hours  insulin NPH human recombinant 10 Unit(s) SubCutaneous <User Schedule>  insulin NPH human recombinant 35 Unit(s) SubCutaneous <User Schedule>  insulin NPH human recombinant 46 Unit(s) SubCutaneous <User Schedule>  lacosamide Solution 200 milliGRAM(s) Oral two times a day  levETIRAcetam  Solution 1000 milliGRAM(s) Oral two times a day  levothyroxine 150 MICROGram(s) Oral daily  metoprolol tartrate 25 milliGRAM(s) Oral every 6 hours  pantoprazole   Suspension 40 milliGRAM(s) Oral daily  predniSONE   Tablet 15 milliGRAM(s) Oral <User Schedule>  predniSONE   Tablet 5 milliGRAM(s) Oral at bedtime  scopolamine 1 mG/72 Hr(s) Patch 1 Patch Transdermal every 72 hours  simvastatin 20 milliGRAM(s) Oral at bedtime    MEDICATIONS  (PRN):  acetaminophen    Suspension .. 650 milliGRAM(s) Oral every 6 hours PRN Temp greater or equal to 38C (100.4F), Mild Pain (1 - 3)  glycopyrrolate Injectable 0.1 milliGRAM(s) IV Push three times a day PRN secretions  oxyCODONE    IR 5 milliGRAM(s) Oral every 6 hours PRN Moderate Pain (4 - 6)  oxyCODONE    IR 10 milliGRAM(s) Oral every 6 hours PRN Severe Pain (7 - 10)  sodium chloride 0.9% lock flush 10 milliLiter(s) IV Push every 1 hour PRN Pre/post blood products, medications, blood draw, and to maintain line patency         LABS:                                 8.1    8.53  )-----------( 344      ( 22 Jun 2020 20:33 )             26.7     06-22    142  |  104  |  27<H>  ----------------------------<  100<H>  4.0   |  28  |  0.65    Ca    9.0      22 Jun 2020 20:33  Phos  3.2     06-22  Mg     2.3     06-22                   PHYSICAL EXAM:  Neurological:  intubated, EO spont, does not track, disconjugated gaze, pupils 3mm reactive, +cough/gag, not follow commands, upper extremities tremors when stimulated, LE TF  Cardiovascular: +s1, s2  Respiratory: clear to auscultation b/l  Gastrointestinal: soft, non-distended, non-tender  abd: soft, non-tender, nl bs ,+ skin lesion  Extremities: Warm, dry  + skin tear on buttocks    DIET: Glucerna @65    IMAGING: no new imaging

## 2020-06-24 NOTE — PROGRESS NOTE ADULT - ASSESSMENT
65 yo F with uncontrolled T2DM with no known complications, hypothyroidism/ul HTN/ COPD and RA on chronic steroids here with mental status changes associated with shaking found to have a left frontal parenchymal hematoma with mass effect on the left lateral ventricle and intraventricular extension with blood layering in the occipital horns s/p intubation on TF, awaiting family GOC decisions. BG values at goal on continuous tube feeds + NPH regimen. At risk for hypoglycemia today as 50% of NPH dose given while pt is NPO. BG goal (100-180mg/dl).

## 2020-06-25 LAB
GLUCOSE BLDC GLUCOMTR-MCNC: 115 MG/DL — HIGH (ref 70–99)
GLUCOSE BLDC GLUCOMTR-MCNC: 199 MG/DL — HIGH (ref 70–99)
GLUCOSE BLDC GLUCOMTR-MCNC: 214 MG/DL — HIGH (ref 70–99)
GLUCOSE BLDC GLUCOMTR-MCNC: 84 MG/DL — SIGNIFICANT CHANGE UP (ref 70–99)

## 2020-06-25 PROCEDURE — 71045 X-RAY EXAM CHEST 1 VIEW: CPT | Mod: 26

## 2020-06-25 PROCEDURE — 99291 CRITICAL CARE FIRST HOUR: CPT

## 2020-06-25 PROCEDURE — 99231 SBSQ HOSP IP/OBS SF/LOW 25: CPT

## 2020-06-25 RX ORDER — METOPROLOL TARTRATE 50 MG
5 TABLET ORAL ONCE
Refills: 0 | Status: COMPLETED | OUTPATIENT
Start: 2020-06-25 | End: 2020-06-25

## 2020-06-25 RX ADMIN — CHLORHEXIDINE GLUCONATE 1 APPLICATION(S): 213 SOLUTION TOPICAL at 21:47

## 2020-06-25 RX ADMIN — Medication 150 MICROGRAM(S): at 04:19

## 2020-06-25 RX ADMIN — SCOPALAMINE 1 PATCH: 1 PATCH, EXTENDED RELEASE TRANSDERMAL at 08:18

## 2020-06-25 RX ADMIN — HUMAN INSULIN 35 UNIT(S): 100 INJECTION, SUSPENSION SUBCUTANEOUS at 12:14

## 2020-06-25 RX ADMIN — OXYCODONE HYDROCHLORIDE 10 MILLIGRAM(S): 5 TABLET ORAL at 04:19

## 2020-06-25 RX ADMIN — AMLODIPINE BESYLATE 10 MILLIGRAM(S): 2.5 TABLET ORAL at 04:18

## 2020-06-25 RX ADMIN — Medication 2: at 12:15

## 2020-06-25 RX ADMIN — SIMVASTATIN 20 MILLIGRAM(S): 20 TABLET, FILM COATED ORAL at 21:47

## 2020-06-25 RX ADMIN — Medication 25 MILLIGRAM(S): at 04:19

## 2020-06-25 RX ADMIN — Medication 15 MILLIGRAM(S): at 04:19

## 2020-06-25 RX ADMIN — ENOXAPARIN SODIUM 30 MILLIGRAM(S): 100 INJECTION SUBCUTANEOUS at 04:27

## 2020-06-25 RX ADMIN — CHLORHEXIDINE GLUCONATE 15 MILLILITER(S): 213 SOLUTION TOPICAL at 17:50

## 2020-06-25 RX ADMIN — Medication 2 MILLIGRAM(S): at 23:01

## 2020-06-25 RX ADMIN — CHLORHEXIDINE GLUCONATE 15 MILLILITER(S): 213 SOLUTION TOPICAL at 04:18

## 2020-06-25 RX ADMIN — Medication 650 MILLIGRAM(S): at 00:00

## 2020-06-25 RX ADMIN — Medication 4: at 04:27

## 2020-06-25 RX ADMIN — HUMAN INSULIN 46 UNIT(S): 100 INJECTION, SUSPENSION SUBCUTANEOUS at 04:27

## 2020-06-25 RX ADMIN — LACOSAMIDE 200 MILLIGRAM(S): 50 TABLET ORAL at 17:54

## 2020-06-25 RX ADMIN — LACOSAMIDE 200 MILLIGRAM(S): 50 TABLET ORAL at 04:19

## 2020-06-25 RX ADMIN — Medication 25 MILLIGRAM(S): at 17:57

## 2020-06-25 RX ADMIN — LEVETIRACETAM 1000 MILLIGRAM(S): 250 TABLET, FILM COATED ORAL at 17:57

## 2020-06-25 RX ADMIN — LEVETIRACETAM 1000 MILLIGRAM(S): 250 TABLET, FILM COATED ORAL at 04:19

## 2020-06-25 RX ADMIN — HUMAN INSULIN 10 UNIT(S): 100 INJECTION, SUSPENSION SUBCUTANEOUS at 17:50

## 2020-06-25 RX ADMIN — Medication 110 MILLIGRAM(S): at 08:00

## 2020-06-25 RX ADMIN — Medication 5 MILLIGRAM(S): at 21:47

## 2020-06-25 RX ADMIN — Medication 25 MILLIGRAM(S): at 12:14

## 2020-06-25 RX ADMIN — ENOXAPARIN SODIUM 30 MILLIGRAM(S): 100 INJECTION SUBCUTANEOUS at 17:50

## 2020-06-25 RX ADMIN — PANTOPRAZOLE SODIUM 40 MILLIGRAM(S): 20 TABLET, DELAYED RELEASE ORAL at 12:14

## 2020-06-25 NOTE — PROGRESS NOTE ADULT - PROBLEM SELECTOR PLAN 1
-test BG Qe6h  -c/w NPH 46 units @ 6am  -c/w NPH 35 units @ 12pm  -c/w NPH 10 units @ 6pm/12am  -hold NPH if Tube feeds off. Can give 50% of dose if BG less than 100mg/dl)  -Please notify endocrine if any changes in steroid regimen or TF regimen so we can adjust insulin regimen accordingly.

## 2020-06-25 NOTE — PROGRESS NOTE ADULT - PROBLEM SELECTOR PLAN 2
Plan: -Levothyroxine 150mcg per PEG daily  -Hold TF 1 hour prior and after levothyroxine given. Do not give with other medications.    discussed w/pt's family member and RN  pager: [phone]   cell: [phone]  office: [phone]    -I spent a total time of 15 mins with the patient at bedside/on unit of which more than 50% of time was spent on counseling/coordination of care.

## 2020-06-25 NOTE — PROGRESS NOTE ADULT - ASSESSMENT
63 yo F with uncontrolled T2DM with no known complications, hypothyroidism/ul HTN/ COPD and RA on chronic steroids here with mental status changes associated with shaking found to have a left frontal parenchymal hematoma with mass effect on the left lateral ventricle and intraventricular extension with blood layering in the occipital horns s/p intubation on TF, awaiting family GOC decisions. BG values elevated after only receiving 1/2 of NPH dose w/interruption in TF yesterday. Trending down today so will observe for another 24 hours before making any further changes to insulin regimen. BG goal (100-200mg/dl).

## 2020-06-25 NOTE — PROGRESS NOTE ADULT - SUBJECTIVE AND OBJECTIVE BOX
SUMMARY: 65 yo F with past medical hx of hypothyroidism, HTN, IDDM, COPD/CHRIS on home O2, RA (on Prednisone), SBO s/p ex lap with lysis of adhesions c/b evisceration,  recent hospitalization in 3/2020 with encephalopathy, significantly hypothyroid (, without myxedema coma), small (stable) right sided SDH, UTI, catatonic reaction to VPA (now resolved) and DKA and psychiatric issues, 2020 hospitalization for GIB, Emphysematous cystitis, endometritis, Sigmoid colitis, morbid obesity, functional quadriplegia presented on  with AMS and was found to have L frontal IPH. Per report, patient seized at home prior to presentation, but has not noted to be seizing during the hospitalization. Continued Goals of care discussion with family; palliative and ethics involved    HCP: Norris Rush, Son [phone]; Daughter, Heavenly Schmidt [phone]; son asks that only him or his sister, Heavenly be contacted for patient updates/information as they are both proxy's for patient (29 May 2020 23:41)    ADMISSION SCORES:  GCS: 5 [E2 VNT M3]  ICH: 2    OVERNIGHT EVENTS:    ICU Vital Signs Last 24 Hrs  T(C): 37.6 (2020 07:00), Max: 37.6 (2020 07:00)  T(F): 99.7 (2020 07:00), Max: 99.7 (2020 07:00)  HR: 118 (2020 07:00) (84 - 118)  BP: 141/85 (2020 07:00) (118/76 - 181/102)  BP(mean): 101 (2020 07:00) (88 - 172)  ABP: --  ABP(mean): --  RR: 21 (2020 07:00) (13 - 24)  SpO2: 100% (2020 07:00) (96% - 100%)    I&O's Detail    2020 07:01  -  2020 07:00  --------------------------------------------------------  IN:    Enteral Tube Flush: 470 mL    Glucerna: 1105 mL  Total IN: 1575 mL    OUT:    Incontinent per Collection Ba mL    Intermittent Catheterization - Urethral: 950 mL  Total OUT: 1570 mL    Total NET: 5 mL    MEDICATIONS  (STANDING):  amLODIPine   Tablet 10 milliGRAM(s) Oral daily  chlorhexidine 0.12% Liquid 15 milliLiter(s) Oral Mucosa every 12 hours  chlorhexidine 4% Liquid 1 Application(s) Topical <User Schedule>  dextrose 5%. 1000 milliLiter(s) (50 mL/Hr) IV Continuous <Continuous>  dextrose 50% Injectable 25 Gram(s) IV Push once  doxazosin 2 milliGRAM(s) Oral at bedtime  enoxaparin Injectable 30 milliGRAM(s) SubCutaneous two times a day  insulin lispro (HumaLOG) corrective regimen sliding scale   SubCutaneous every 6 hours  insulin NPH human recombinant 10 Unit(s) SubCutaneous <User Schedule>  insulin NPH human recombinant 35 Unit(s) SubCutaneous <User Schedule>  insulin NPH human recombinant 46 Unit(s) SubCutaneous <User Schedule>  lacosamide Solution 200 milliGRAM(s) Oral two times a day  levETIRAcetam  Solution 1000 milliGRAM(s) Oral two times a day  levothyroxine 150 MICROGram(s) Oral daily  metoprolol tartrate 25 milliGRAM(s) Oral every 6 hours  pantoprazole   Suspension 40 milliGRAM(s) Oral daily  predniSONE   Tablet 15 milliGRAM(s) Oral <User Schedule>  predniSONE   Tablet 5 milliGRAM(s) Oral at bedtime  scopolamine 1 mG/72 Hr(s) Patch 1 Patch Transdermal every 72 hours  simvastatin 20 milliGRAM(s) Oral at bedtime    MEDICATIONS  (PRN):  acetaminophen    Suspension .. 650 milliGRAM(s) Oral every 6 hours PRN Temp greater or equal to 38C (100.4F), Mild Pain (1 - 3)  glycopyrrolate Injectable 0.1 milliGRAM(s) IV Push three times a day PRN secretions  oxyCODONE    IR 5 milliGRAM(s) Oral every 6 hours PRN Moderate Pain (4 - 6)  oxyCODONE    IR 10 milliGRAM(s) Oral every 6 hours PRN Severe Pain (7 - 10)  sodium chloride 0.9% lock flush 10 milliLiter(s) IV Push every 1 hour PRN Pre/post blood products, medications, blood draw, and to maintain line patency         LABS:           No recent labs.              PHYSICAL EXAM:  Neurological:  intubated, EO spont, does not track, disconjugated gaze, pupils 3mm reactive, +cough/gag, not follow commands, upper extremities tremors when stimulated, LE TF  Cardiovascular: +s1, s2  Respiratory: clear to auscultation b/l  Gastrointestinal: soft, non-distended, non-tender  abd: soft, non-tender, nl bs ,+ skin lesion  Extremities: Warm, dry  + skin tear on buttocks    DIET: Glucerna @65    IMAGING: no new imaging SUMMARY: 65 yo F with past medical hx of hypothyroidism, HTN, IDDM, COPD/CHRIS on home O2, RA (on Prednisone), SBO s/p ex lap with lysis of adhesions c/b evisceration,  recent hospitalization in 3/2020 with encephalopathy, significantly hypothyroid (, without myxedema coma), small (stable) right sided SDH, UTI, catatonic reaction to VPA (now resolved) and DKA and psychiatric issues, 2020 hospitalization for GIB, Emphysematous cystitis, endometritis, Sigmoid colitis, morbid obesity, functional quadriplegia presented on  with AMS and was found to have L frontal IPH. Per report, patient seized at home prior to presentation, but has not noted to be seizing during the hospitalization. Continued Goals of care discussion with family; palliative and ethics involved    HCP: Norris Rush, Son [phone]; Daughter, Heavenly Schmidt [phone]; son asks that only him or his sister, Heavenly be contacted for patient updates/information as they are both proxy's for patient (29 May 2020 23:41)    ADMISSION SCORES:  GCS: 5 [E2 VNT M3]  ICH: 2    OVERNIGHT/ Early am events- -119- increased BB    ICU Vital Signs Last 24 Hrs  T(C): 37.6 (2020 07:00), Max: 37.6 (2020 07:00)  T(F): 99.7 (2020 07:00), Max: 99.7 (2020 07:00)  HR: 118 (2020 07:00) (84 - 118)  BP: 141/85 (2020 07:00) (118/76 - 181/102)  BP(mean): 101 (2020 07:00) (88 - 172)  RR: 21 (2020 07:00) (13 - 24)  SpO2: 100% (2020 07:00) (96% - 100%)    I&O's Detail    2020 07:01  -  2020 07:00  --------------------------------------------------------  IN:    Enteral Tube Flush: 470 mL    Glucerna: 1105 mL  Total IN: 1575 mL    OUT:    Incontinent per Collection Ba mL    Intermittent Catheterization - Urethral: 950 mL  Total OUT: 1570 mL    Total NET: 5 mL    MEDICATIONS  (STANDING):  amLODIPine   Tablet 10 milliGRAM(s) Oral daily  chlorhexidine 0.12% Liquid 15 milliLiter(s) Oral Mucosa every 12 hours  chlorhexidine 4% Liquid 1 Application(s) Topical <User Schedule>  dextrose 5%. 1000 milliLiter(s) (50 mL/Hr) IV Continuous <Continuous>  dextrose 50% Injectable 25 Gram(s) IV Push once  doxazosin 2 milliGRAM(s) Oral at bedtime  enoxaparin Injectable 30 milliGRAM(s) SubCutaneous two times a day  insulin lispro (HumaLOG) corrective regimen sliding scale   SubCutaneous every 6 hours  insulin NPH human recombinant 10 Unit(s) SubCutaneous <User Schedule>  insulin NPH human recombinant 35 Unit(s) SubCutaneous <User Schedule>  insulin NPH human recombinant 46 Unit(s) SubCutaneous <User Schedule>  lacosamide Solution 200 milliGRAM(s) Oral two times a day  levETIRAcetam  Solution 1000 milliGRAM(s) Oral two times a day  levothyroxine 150 MICROGram(s) Oral daily  metoprolol tartrate 25 milliGRAM(s) Oral every 6 hours  pantoprazole   Suspension 40 milliGRAM(s) Oral daily  predniSONE   Tablet 15 milliGRAM(s) Oral <User Schedule>  predniSONE   Tablet 5 milliGRAM(s) Oral at bedtime  scopolamine 1 mG/72 Hr(s) Patch 1 Patch Transdermal every 72 hours  simvastatin 20 milliGRAM(s) Oral at bedtime    MEDICATIONS  (PRN):  acetaminophen    Suspension .. 650 milliGRAM(s) Oral every 6 hours PRN Temp greater or equal to 38C (100.4F), Mild Pain (1 - 3)  glycopyrrolate Injectable 0.1 milliGRAM(s) IV Push three times a day PRN secretions  oxyCODONE    IR 5 milliGRAM(s) Oral every 6 hours PRN Moderate Pain (4 - 6)  oxyCODONE    IR 10 milliGRAM(s) Oral every 6 hours PRN Severe Pain (7 - 10)  sodium chloride 0.9% lock flush 10 milliLiter(s) IV Push every 1 hour PRN Pre/post blood products, medications, blood draw, and to maintain line patency         LABS:           No recent labs- last performed .              PHYSICAL EXAM:  Neurological:  intubated, EO spont, does not track, disconjugated gaze, pupils 3mm reactive, +cough/gag, not follow commands, upper extremities tremors when stimulated, LE TF  Cardiovascular: +s1, s2  Respiratory: clear to auscultation b/l  Gastrointestinal: soft, non-distended, non-tender  abd: soft, non-tender, nl bs ,+ skin lesion  Extremities: Warm, dry  + skin tear on buttocks    DIET: Glucerna @65    IMAGING: no new imaging SUMMARY: 63 yo F with past medical hx of hypothyroidism, HTN, IDDM, COPD/CHRIS on home O2, RA (on Prednisone), SBO s/p ex lap with lysis of adhesions c/b evisceration,  recent hospitalization in 3/2020 with encephalopathy, significantly hypothyroid (, without myxedema coma), small (stable) right sided SDH, UTI, catatonic reaction to VPA (now resolved) and DKA and psychiatric issues, 2020 hospitalization for GIB, Emphysematous cystitis, endometritis, Sigmoid colitis, morbid obesity, functional quadriplegia presented on  with AMS and was found to have L frontal IPH. Per report, patient seized at home prior to presentation, but has not noted to be seizing during the hospitalization. Continued Goals of care discussion with family; palliative and ethics involved    HCP: Norris Rush, Son [phone]; Daughter, Heavenly Schmidt [phone]; son asks that only him or his sister, Heavenly be contacted for patient updates/information as they are both proxy's for patient (29 May 2020 23:41)    ADMISSION SCORES:  GCS: 5 [E2 VNT M3]  ICH: 2    OVERNIGHT/ Early am events- -119- increased BB    ICU Vital Signs Last 24 Hrs  T(C): 37.6 (2020 07:00), Max: 37.6 (2020 07:00)  T(F): 99.7 (2020 07:00), Max: 99.7 (2020 07:00)  HR: 118 (2020 07:00) (84 - 118)  BP: 141/85 (2020 07:00) (118/76 - 181/102)  BP(mean): 101 (2020 07:00) (88 - 172)  RR: 21 (2020 07:00) (13 - 24)  SpO2: 100% (2020 07:00) (96% - 100%)    I&O's Detail    2020 07:01  -  2020 07:00  --------------------------------------------------------  IN:    Enteral Tube Flush: 470 mL    Glucerna: 1105 mL  Total IN: 1575 mL    OUT:    Incontinent per Collection Ba mL    Intermittent Catheterization - Urethral: 950 mL  Total OUT: 1570 mL    Total NET: 5 mL    Xray Chest 1 View- PORTABLE-Routine (20 @ 05:28) >    Impression:    The heart is normal in size. Platelike atelectasis left lower lobe. The right lung is clear. Endotracheal tube is just above the corwin and should be repositioned slightly. A central line is seen on the right and the tip is in superior vena cava. No pneumothorax.        MEDICATIONS  (STANDING):  amLODIPine   Tablet 10 milliGRAM(s) Oral daily  chlorhexidine 0.12% Liquid 15 milliLiter(s) Oral Mucosa every 12 hours  chlorhexidine 4% Liquid 1 Application(s) Topical <User Schedule>  dextrose 5%. 1000 milliLiter(s) (50 mL/Hr) IV Continuous <Continuous>  dextrose 50% Injectable 25 Gram(s) IV Push once  doxazosin 2 milliGRAM(s) Oral at bedtime  enoxaparin Injectable 30 milliGRAM(s) SubCutaneous two times a day  insulin lispro (HumaLOG) corrective regimen sliding scale   SubCutaneous every 6 hours  insulin NPH human recombinant 10 Unit(s) SubCutaneous <User Schedule>  insulin NPH human recombinant 35 Unit(s) SubCutaneous <User Schedule>  insulin NPH human recombinant 46 Unit(s) SubCutaneous <User Schedule>  lacosamide Solution 200 milliGRAM(s) Oral two times a day  levETIRAcetam  Solution 1000 milliGRAM(s) Oral two times a day  levothyroxine 150 MICROGram(s) Oral daily  metoprolol tartrate 25 milliGRAM(s) Oral every 6 hours  pantoprazole   Suspension 40 milliGRAM(s) Oral daily  predniSONE   Tablet 15 milliGRAM(s) Oral <User Schedule>  predniSONE   Tablet 5 milliGRAM(s) Oral at bedtime  simvastatin 20 milliGRAM(s) Oral at bedtime    MEDICATIONS  (PRN):  acetaminophen    Suspension .. 650 milliGRAM(s) Oral every 6 hours PRN Temp greater or equal to 38C (100.4F), Mild Pain (1 - 3)  glycopyrrolate Injectable 0.1 milliGRAM(s) IV Push three times a day PRN secretions  oxyCODONE    IR 5 milliGRAM(s) Oral every 6 hours PRN Moderate Pain (4 - 6)  oxyCODONE    IR 10 milliGRAM(s) Oral every 6 hours PRN Severe Pain (7 - 10)  sodium chloride 0.9% lock flush 10 milliLiter(s) IV Push every 1 hour PRN Pre/post blood products, medications, blood draw, and to maintain line patency    DIET: Glucerna @65     LABS:           No recent labs- last performed .              PHYSICAL EXAM:  Neurological:  intubated, EO spont, does not track, disconjugated gaze, pupils 3mm reactive, +cough/gag, not follow commands, upper extremities tremors when stimulated, LE TF/no movement  to noxious   Cardiovascular: +s1, s2  Respiratory: clear to auscultation b/l  Gastrointestinal: soft, non-distended, non-tender  abd: soft, non-tender, nl bs ,+ skin lesion  Extremities: Warm, dry  + skin tear on buttocks

## 2020-06-25 NOTE — PROGRESS NOTE ADULT - SUBJECTIVE AND OBJECTIVE BOX
Diabetes Follow up note:    Chief complaint: T2DM/steroid induced hyperglycemia    Interval Hx: Restarted on tube feeds yesterday. BG values elevated after receiving reduced NPH dose but downtrending today. Transferred to MICU today. Tolerating TF @ goal. Daughter in law at bedside asking for update on patient's condition.     Review of Systems:  unresponsive. Unable to provide ROS.     MEDS:  insulin lispro (HumaLOG) corrective regimen sliding scale   SubCutaneous every 6 hours  insulin NPH human recombinant 10 Unit(s) SubCutaneous <User Schedule>  insulin NPH human recombinant 35 Unit(s) SubCutaneous <User Schedule>  insulin NPH human recombinant 46 Unit(s) SubCutaneous <User Schedule>  levothyroxine 150 MICROGram(s) Oral daily  predniSONE   Tablet 15 milliGRAM(s) Oral <User Schedule>  predniSONE   Tablet 5 milliGRAM(s) Oral at bedtime  simvastatin 20 milliGRAM(s) Oral at bedtime      Allergies    Depakote (Other)  Seroquel (Other (Severe))      PE:  General: Female lying in bed. Intubated.   Vital Signs Last 24 Hrs  T(C): 37.6 (25 Jun 2020 10:00), Max: 37.6 (25 Jun 2020 07:00)  T(F): 99.6 (25 Jun 2020 10:00), Max: 99.7 (25 Jun 2020 07:00)  HR: 83 (25 Jun 2020 13:12) (83 - 118)  BP: 138/65 (25 Jun 2020 12:00) (118/76 - 181/102)  BP(mean): 93 (25 Jun 2020 12:00) (88 - 172)  RR: 14 (25 Jun 2020 13:12) (13 - 27)  SpO2: 100% (25 Jun 2020 13:12) (96% - 100%)  HEENT: ETT in place. on vent.   Abd: Soft, NT,ND, Obese.   Extremities: Warm  Neuro: Unresponsive to verbal stimuli    LABS:  POCT Blood Glucose.: 199 mg/dL (06-25-20 @ 11:49)  POCT Blood Glucose.: 214 mg/dL (06-25-20 @ 04:17)  POCT Blood Glucose.: 218 mg/dL (06-24-20 @ 23:28)  POCT Blood Glucose.: 296 mg/dL (06-24-20 @ 17:17)  POCT Blood Glucose.: 283 mg/dL (06-24-20 @ 17:14)  POCT Blood Glucose.: 188 mg/dL (06-24-20 @ 11:48)  POCT Blood Glucose.: 149 mg/dL (06-24-20 @ 05:17)  POCT Blood Glucose.: 128 mg/dL (06-23-20 @ 23:01)  POCT Blood Glucose.: 145 mg/dL (06-23-20 @ 17:02)  POCT Blood Glucose.: 249 mg/dL (06-23-20 @ 11:15)  POCT Blood Glucose.: 209 mg/dL (06-23-20 @ 05:11)  POCT Blood Glucose.: 113 mg/dL (06-22-20 @ 23:01)  POCT Blood Glucose.: 118 mg/dL (06-22-20 @ 17:11)                  Thyroid Function Tests:  06-11 @ 13:58 TSH 8.26 FreeT4 -- T3 55 Anti TPO -- Anti Thyroglobulin Ab -- TSI --  06-04 @ 01:58 TSH -- FreeT4 2.7 T3 -- Anti TPO -- Anti Thyroglobulin Ab -- TSI --      A1C with Estimated Average Glucose Result: 7.4 % (05-30-20 @ 04:43)  A1C with Estimated Average Glucose Result: 6.4 % (05-14-20 @ 16:23)  A1C with Estimated Average Glucose Result: 6.5 % (05-13-20 @ 08:24)  Hemoglobin A1C, Whole Blood: 9.0 % (01-28-20 @ 19:30)  Hemoglobin A1C, Whole Blood: 9.2 % (01-27-20 @ 17:36)          Contact number: bhavna [phone] or [phone]

## 2020-06-25 NOTE — CHART NOTE - NSCHARTNOTEFT_GEN_A_CORE
A review of the paper chart has been conducted  HCP form present:               Yes and valid [x]               Yes but invalid []                No []   MOLST present:                   Yes and valid []               Yes but invalid []                No [x]  Incapacity form present:   Yes and valid []                Yes but invalid []                No []                 N/A [x]  Living Will:                            Yes and valid []                Yes but invalid [x]        Witnessed properly, desired choices are not signed        No []      Family Health Care Decision Act (FHCDA) Surrogate Decision Maker Hierarchy in the absence of a health care agent  L [address] Guardian-->Spouse or domestic partner--> Adult child-->Parent-->Sibling--> Close friend    Pt seen and evaluated post transfer to MICU  Chart reviewed which states family may come this weekend to decide about tracheostomy    Arsalan Eric MD   of Geriatric and Palliative Medicine  [facility]     After 5pm and on weekends, please see the contact information below:    In the event of newly developing, evolving, or worsening symptoms, please contact the Palliative Medicine team via pager (if the patient is at [facility] #[phone] or if the patient is at [facility] #[phone]) The Geriatric and Palliative Medicine service has coverage 24 hours a day/ 7 days a week to provide medical recommendations regarding symptom management needs via telephone

## 2020-06-25 NOTE — PROGRESS NOTE ADULT - ASSESSMENT
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    Trach and PEG being discussed with family. no decision yet   Activity: [] OOB as tolerated [] Bedrest [X] PT [X] OT [] PMNR      PULM:  Intubated since 5/29- Day 28 of intubation.- awaiting family decision on goals of care - not likely to withstand extubation.  Meet with family goals of care , if compassionate extubation- solumedrol, glycopyrraltae  prior    H/O of COPD   CPAP as tolerated.    A lot of oral secretions- scopolamine patch       CV:  Keep -160  Anasarca- [id number]  HTN: Continue norvasc   sinus tachycardia, metoprolol to 25 q6h  Statin for HLD        RENAL:  IVL. Monitor Is/Os  Female iraheta  on cardura for urinary retention      GI:  Diet: On Glucerna OGT feeds---> NPO  Continue Banana flakes and metamucil , LBM 6/23  GI prophylaxis [] not indicated [X] PO PPI [] other:      ENDO: S/P hyperglycemia - IR DM  Endo Following for adjustment of NPH; will follow up on sugar levels  Continue PO synthroid  Continue prednisone 15mg, 5 mg at bedtime (was on it at home for RA). Likely etiology of bruising  Goal euglycemia (-180)      HEME/ONC:   On SQL for DVT ppx- 30mg SQ q12.  VTE prophylaxis: [X] SCDs [X] chemoprophylaxis [X] high risk of DVT/PE on admission due to: bed bound at baseline  LE doppler 5/30- No DVT    ID:  Afebrile   Ertapnem for ESBL found growing in urine culture on 6/4 - ended on  - 6/14    MISC: Burdick to call unit today to tell Dr. French regarding discussion.     SOCIAL/FAMILY:  [X] updated family via phone  [] family meeting    CODE STATUS:  [X] Full Code     DISPOSITION:  [X] ICU    [X] Patient is at high risk of neurologic deterioration/death due to: resp failure, seizures, hemorrhage. 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    Trach and PEG being discussed with family. no decision yet   Activity: [] OOB as tolerated [] Bedrest [X] PT [X] OT [] PMNR      PULM:  Intubated since 5/29- Day 28 of intubation.- awaiting family decision on goals of care - not likely to withstand extubation.  Meet with family goals of care , if compassionate extubation- solumedrol, glycopyrraltae  prior    H/O of COPD   CPAP as tolerated.    A lot of oral secretions- scopolamine patch       CV:  Keep -160  Anasarca- [id number]  HTN: Continue norvasc   sinus tachycardia, metoprolol to 25 q6h  Statin for HLD        RENAL:  IVL. Monitor Is/Os  Female iraheta  on cardura for urinary retention      GI:  Diet: On Glucerna OGT feeds---> NPO  Continue Banana flakes and metamucil , LBM 6/23  GI prophylaxis [] not indicated [X] PO PPI [] other:      ENDO: S/P hyperglycemia - IR DM  Endo Following for adjustment of NPH; will follow up on sugar levels  Continue PO synthroid  Continue prednisone 15mg, 5 mg at bedtime (was on it at home for RA). Likely etiology of bruising  Goal euglycemia (-180)      HEME/ONC:   On SQL for DVT ppx- 30mg SQ q12.  VTE prophylaxis: [X] SCDs [X] chemoprophylaxis [X] high risk of DVT/PE on admission due to: bed bound at baseline  LE doppler 5/30- No DVT    ID:  Afebrile   Ertapnem for ESBL found growing in urine culture on 6/4 - ended on  - 6/14    MISC: Spoke with daughter Heavenly discussed complications again of prolonged extubation and importance of making decision for TRACH or trial of extubation. She declined DNR/DNR.  Palliative care informed.  Heavenly states family again to come by on weekend and make decision.  Again stressed importance of risk of prolonged intubation she was appreciative of the input and will discuss with family.     SOCIAL/FAMILY:  [X] updated family via phone  [] family meeting    CODE STATUS:  [X] Full Code     DISPOSITION:  [X] ICU    [X] Patient is at high risk of neurologic deterioration/death due to: resp failure, seizures, hemorrhage. 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    Trach and PEG being discussed with family. no decision yet   Activity: [] OOB as tolerated [] Bedrest [X] PT [X] OT [] PMNR      PULM:  Intubated since 5/29- Day 28 of intubation.- awaiting family decision on goals of care - not likely to withstand extubation.  Meet with family goals of care , if compassionate extubation- solumedrol, glycopyrraltae  prior - see MISC below   H/O of COPD   CPAP as tolerated.   SATS >96 %         CV: Intermittently tachy   - D/C scopolamine for TACHY  Keep -160  Anasarca- [id number]  HTN: Continue norvasc   sinus tachycardia, metoprolol to 25 q6h  Statin for HLD        RENAL:  IVL. Monitor Is/Os  iNCONT  on cardura for urinary retention      GI:  Diet: On Glucerna OGT   Continue Banana flakes and metamucil , LBM 6/24  GI prophylaxis [] not indicated [X] PO PPI [] other:      ENDO: S/P hyperglycemia - IR DM  Endo Following for adjustment of NPH; will follow up on sugar levels  Continue PO synthroid  Continue prednisone 15mg, 5 mg at bedtime (was on it at home for RA). Likely etiology of bruising  Goal euglycemia (-180)      HEME/ONC:   On SQL for DVT ppx- 30mg SQ q12.  VTE prophylaxis: [X] SCDs [X] chemoprophylaxis [X] high risk of DVT/PE on admission due to: bed bound at baseline  LE doppler 5/30- No DVT    ID:  Afebrile   Ertapnem for ESBL found growing in urine culture on 6/4 - ended on  - 6/14    MISC: Spoke with daughter Heavenly ( Health Care Proxy) discussed complications again of prolonged extubation and importance of making decision for TRACH or trial of extubation. She declined DNR/DNI.  Palliative care informed.  Heavenly states family again to come by on weekend and make decision.  Again stressed Heavenly is the ultimate care giver and discussed  importance of risk of prolonged intubation she was appreciative of the input and will discuss with family.     SOCIAL/FAMILY:  [X] updated family via phone  [] family meeting    CODE STATUS:  [X] Full Code     DISPOSITION:  [X] ICU    [X] Patient is at high risk of neurologic deterioration/death due to: resp failure, seizures, hemorrhage.

## 2020-06-25 NOTE — PROGRESS NOTE ADULT - NSHPATTENDINGPLANDISCUSS_GEN_ALL_CORE
MYLES Espinosa
ICU team
ICU team
[id number]
NSCU team
ICU team

## 2020-06-26 LAB
GLUCOSE BLDC GLUCOMTR-MCNC: 127 MG/DL — HIGH (ref 70–99)
GLUCOSE BLDC GLUCOMTR-MCNC: 215 MG/DL — HIGH (ref 70–99)
GLUCOSE BLDC GLUCOMTR-MCNC: 216 MG/DL — HIGH (ref 70–99)
GLUCOSE BLDC GLUCOMTR-MCNC: 95 MG/DL — SIGNIFICANT CHANGE UP (ref 70–99)

## 2020-06-26 PROCEDURE — 99291 CRITICAL CARE FIRST HOUR: CPT

## 2020-06-26 PROCEDURE — 99232 SBSQ HOSP IP/OBS MODERATE 35: CPT

## 2020-06-26 RX ADMIN — ENOXAPARIN SODIUM 30 MILLIGRAM(S): 100 INJECTION SUBCUTANEOUS at 05:54

## 2020-06-26 RX ADMIN — ENOXAPARIN SODIUM 30 MILLIGRAM(S): 100 INJECTION SUBCUTANEOUS at 17:52

## 2020-06-26 RX ADMIN — Medication 25 MILLIGRAM(S): at 12:32

## 2020-06-26 RX ADMIN — LEVETIRACETAM 1000 MILLIGRAM(S): 250 TABLET, FILM COATED ORAL at 17:52

## 2020-06-26 RX ADMIN — HUMAN INSULIN 10 UNIT(S): 100 INJECTION, SUSPENSION SUBCUTANEOUS at 17:52

## 2020-06-26 RX ADMIN — LEVETIRACETAM 1000 MILLIGRAM(S): 250 TABLET, FILM COATED ORAL at 05:53

## 2020-06-26 RX ADMIN — AMLODIPINE BESYLATE 10 MILLIGRAM(S): 2.5 TABLET ORAL at 05:54

## 2020-06-26 RX ADMIN — HUMAN INSULIN 10 UNIT(S): 100 INJECTION, SUSPENSION SUBCUTANEOUS at 23:41

## 2020-06-26 RX ADMIN — Medication 4: at 12:19

## 2020-06-26 RX ADMIN — Medication 15 MILLIGRAM(S): at 06:00

## 2020-06-26 RX ADMIN — LACOSAMIDE 200 MILLIGRAM(S): 50 TABLET ORAL at 17:52

## 2020-06-26 RX ADMIN — Medication 25 MILLIGRAM(S): at 17:52

## 2020-06-26 RX ADMIN — LACOSAMIDE 200 MILLIGRAM(S): 50 TABLET ORAL at 06:19

## 2020-06-26 RX ADMIN — Medication 5 MILLIGRAM(S): at 23:18

## 2020-06-26 RX ADMIN — CHLORHEXIDINE GLUCONATE 15 MILLILITER(S): 213 SOLUTION TOPICAL at 17:51

## 2020-06-26 RX ADMIN — CHLORHEXIDINE GLUCONATE 1 APPLICATION(S): 213 SOLUTION TOPICAL at 23:19

## 2020-06-26 RX ADMIN — Medication 25 MILLIGRAM(S): at 23:18

## 2020-06-26 RX ADMIN — HUMAN INSULIN 35 UNIT(S): 100 INJECTION, SUSPENSION SUBCUTANEOUS at 12:19

## 2020-06-26 RX ADMIN — Medication 4: at 05:54

## 2020-06-26 RX ADMIN — Medication 2 MILLIGRAM(S): at 23:18

## 2020-06-26 RX ADMIN — ROBINUL 0.1 MILLIGRAM(S): 0.2 INJECTION INTRAMUSCULAR; INTRAVENOUS at 20:41

## 2020-06-26 RX ADMIN — PANTOPRAZOLE SODIUM 40 MILLIGRAM(S): 20 TABLET, DELAYED RELEASE ORAL at 12:32

## 2020-06-26 RX ADMIN — SIMVASTATIN 20 MILLIGRAM(S): 20 TABLET, FILM COATED ORAL at 23:16

## 2020-06-26 RX ADMIN — CHLORHEXIDINE GLUCONATE 15 MILLILITER(S): 213 SOLUTION TOPICAL at 05:54

## 2020-06-26 RX ADMIN — Medication 25 MILLIGRAM(S): at 06:00

## 2020-06-26 RX ADMIN — Medication 150 MICROGRAM(S): at 05:54

## 2020-06-26 RX ADMIN — HUMAN INSULIN 46 UNIT(S): 100 INJECTION, SUSPENSION SUBCUTANEOUS at 05:54

## 2020-06-26 NOTE — PROGRESS NOTE ADULT - ASSESSMENT
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    Trach and PEG being discussed with family. no decision yet   Activity: [] OOB as tolerated [] Bedrest [X] PT [X] OT [] PMNR      PULM:  Intubated since 5/29- Day 29 of intubation.- awaiting family decision on goals of care - not likely to withstand extubation.  Meet with family goals of care , if compassionate extubation- solumedrol, glycopyrraltae  prior - see MISC below   H/O of COPD   CPAP as tolerated.   SATS >96 %         CV: Intermittently tachy   - D/C scopolamine for TACHY  Keep -160  Anasarca- [id number]  HTN: Continue norvasc   sinus tachycardia, metoprolol to 25 q6h  Statin for HLD        RENAL:  IVL. Monitor Is/Os  iNCONT  on cardura for urinary retention      GI:  Diet: On Glucerna OGT   Continue Banana flakes and metamucil , LBM 6/24  GI prophylaxis [] not indicated [X] PO PPI [] other:      ENDO: S/P hyperglycemia - IR DM  Endo Following for adjustment of NPH; will follow up on sugar levels  Continue PO synthroid  Continue prednisone 15mg, 5 mg at bedtime (was on it at home for RA). Likely etiology of bruising  Goal euglycemia (-180)      HEME/ONC:   On SQL for DVT ppx- 30mg SQ q12.  VTE prophylaxis: [X] SCDs [X] chemoprophylaxis [X] high risk of DVT/PE on admission due to: bed bound at baseline  LE doppler 5/30- No DVT    ID:  Afebrile   Ertapnem for ESBL found growing in urine culture on 6/4 - ended on  - 6/14    MISC: Spoke with daughter Heavenly ( Health Care Proxy) discussed complications again of prolonged extubation and importance of making decision for TRACH or trial of extubation. She declined DNR/DNI.  Palliative care informed.  Heavenly states family again to come by on weekend and make decision.  Again stressed Heavenly is the ultimate care giver and discussed  importance of risk of prolonged intubation she was appreciative of the input and will discuss with family.     SOCIAL/FAMILY:  [X] updated family via phone  [] family meeting    CODE STATUS:  [X] Full Code     DISPOSITION:  [X] ICU    [X] Patient is at high risk of neurologic deterioration/death due to: resp failure, seizures, hemorrhage.

## 2020-06-26 NOTE — PROGRESS NOTE ADULT - SUBJECTIVE AND OBJECTIVE BOX
Chief Complaint:     History:    MEDICATIONS  (STANDING):  amLODIPine   Tablet 10 milliGRAM(s) Oral daily  chlorhexidine 0.12% Liquid 15 milliLiter(s) Oral Mucosa every 12 hours  chlorhexidine 4% Liquid 1 Application(s) Topical <User Schedule>  dextrose 5%. 1000 milliLiter(s) (50 mL/Hr) IV Continuous <Continuous>  dextrose 50% Injectable 25 Gram(s) IV Push once  doxazosin 2 milliGRAM(s) Oral at bedtime  enoxaparin Injectable 30 milliGRAM(s) SubCutaneous two times a day  insulin lispro (HumaLOG) corrective regimen sliding scale   SubCutaneous every 6 hours  insulin NPH human recombinant 10 Unit(s) SubCutaneous <User Schedule>  insulin NPH human recombinant 35 Unit(s) SubCutaneous <User Schedule>  insulin NPH human recombinant 46 Unit(s) SubCutaneous <User Schedule>  lacosamide Solution 200 milliGRAM(s) Oral two times a day  levETIRAcetam  Solution 1000 milliGRAM(s) Oral two times a day  levothyroxine 150 MICROGram(s) Oral daily  metoprolol tartrate 25 milliGRAM(s) Oral every 6 hours  pantoprazole   Suspension 40 milliGRAM(s) Oral daily  predniSONE   Tablet 15 milliGRAM(s) Oral <User Schedule>  predniSONE   Tablet 5 milliGRAM(s) Oral at bedtime  simvastatin 20 milliGRAM(s) Oral at bedtime    MEDICATIONS  (PRN):  acetaminophen    Suspension .. 650 milliGRAM(s) Oral every 6 hours PRN Temp greater or equal to 38C (100.4F), Mild Pain (1 - 3)  glycopyrrolate Injectable 0.1 milliGRAM(s) IV Push three times a day PRN secretions  oxyCODONE    IR 5 milliGRAM(s) Oral every 6 hours PRN Moderate Pain (4 - 6)  oxyCODONE    IR 10 milliGRAM(s) Oral every 6 hours PRN Severe Pain (7 - 10)  sodium chloride 0.9% lock flush 10 milliLiter(s) IV Push every 1 hour PRN Pre/post blood products, medications, blood draw, and to maintain line patency      Allergies    Depakote (Other)  Seroquel (Other (Severe))    Intolerances      Review of Systems:  Constitutional: No fever  Eyes: No blurry vision  Neuro: No tremors  HEENT: No pain  Cardiovascular: No chest pain, palpitations  Respiratory: No SOB, no cough  GI: No nausea, vomiting, abdominal pain  : No dysuria  Skin: no rash  Psych: no depression  Endocrine: no polyuria, polydipsia  Hem/lymph: no swelling  Osteoporosis: no fractures    ALL OTHER SYSTEMS REVIEWED AND NEGATIVE    UNABLE TO OBTAIN    PHYSICAL EXAM:  VITALS: T(C): 38 (06-26-20 @ 08:00)  T(F): 100.4 (06-26-20 @ 08:00), Max: 100.4 (06-26-20 @ 08:00)  HR: 97 (06-26-20 @ 08:58) (83 - 115)  BP: 126/67 (06-26-20 @ 08:00) (107/59 - 153/74)  RR:  (14 - 28)  SpO2:  (99% - 100%)  Wt(kg): --  GENERAL: NAD, well-groomed, well-developed  EYES: No proptosis, no lid lag, anicteric  HEENT:  Atraumatic, Normocephalic, moist mucous membranes  THYROID: Normal size, no palpable nodules  RESPIRATORY: Clear to auscultation bilaterally; No rales, rhonchi, wheezing, or rubs  CARDIOVASCULAR: Regular rate and rhythm; No murmurs; no peripheral edema  GI: Soft, nontender, non distended, normal bowel sounds  SKIN: Dry, intact, No rashes or lesions  MUSCULOSKELETAL: Full range of motion, normal strength  NEURO: sensation intact, extraocular movements intact, no tremor, normal reflexes  PSYCH: Alert and oriented x 3, normal affect, normal mood  CUSHING'S SIGNS: no striae    POCT Blood Glucose.: 215 mg/dL (06-26-20 @ 05:35)  POCT Blood Glucose.: 84 mg/dL (06-25-20 @ 23:04)  POCT Blood Glucose.: 115 mg/dL (06-25-20 @ 17:43)  POCT Blood Glucose.: 199 mg/dL (06-25-20 @ 11:49)  POCT Blood Glucose.: 214 mg/dL (06-25-20 @ 04:17)  POCT Blood Glucose.: 218 mg/dL (06-24-20 @ 23:28)  POCT Blood Glucose.: 296 mg/dL (06-24-20 @ 17:17)  POCT Blood Glucose.: 283 mg/dL (06-24-20 @ 17:14)  POCT Blood Glucose.: 188 mg/dL (06-24-20 @ 11:48)  POCT Blood Glucose.: 149 mg/dL (06-24-20 @ 05:17)  POCT Blood Glucose.: 128 mg/dL (06-23-20 @ 23:01)  POCT Blood Glucose.: 145 mg/dL (06-23-20 @ 17:02)  POCT Blood Glucose.: 249 mg/dL (06-23-20 @ 11:15)                  Thyroid Function Tests:  06-11 @ 13:58 TSH 8.26 FreeT4 -- T3 55 Anti TPO -- Anti Thyroglobulin Ab -- TSI --  06-04 @ 01:58 TSH -- FreeT4 2.7 T3 -- Anti TPO -- Anti Thyroglobulin Ab -- TSI -- Chief Complaint: T2DM    History: BG elevated this AM in 200s as NPH held at midnight due to FS 84.     MEDICATIONS  (STANDING):  amLODIPine   Tablet 10 milliGRAM(s) Oral daily  chlorhexidine 0.12% Liquid 15 milliLiter(s) Oral Mucosa every 12 hours  chlorhexidine 4% Liquid 1 Application(s) Topical <User Schedule>  dextrose 5%. 1000 milliLiter(s) (50 mL/Hr) IV Continuous <Continuous>  dextrose 50% Injectable 25 Gram(s) IV Push once  doxazosin 2 milliGRAM(s) Oral at bedtime  enoxaparin Injectable 30 milliGRAM(s) SubCutaneous two times a day  insulin lispro (HumaLOG) corrective regimen sliding scale   SubCutaneous every 6 hours  insulin NPH human recombinant 10 Unit(s) SubCutaneous <User Schedule>  insulin NPH human recombinant 35 Unit(s) SubCutaneous <User Schedule>  insulin NPH human recombinant 46 Unit(s) SubCutaneous <User Schedule>  lacosamide Solution 200 milliGRAM(s) Oral two times a day  levETIRAcetam  Solution 1000 milliGRAM(s) Oral two times a day  levothyroxine 150 MICROGram(s) Oral daily  metoprolol tartrate 25 milliGRAM(s) Oral every 6 hours  pantoprazole   Suspension 40 milliGRAM(s) Oral daily  predniSONE   Tablet 15 milliGRAM(s) Oral <User Schedule>  predniSONE   Tablet 5 milliGRAM(s) Oral at bedtime  simvastatin 20 milliGRAM(s) Oral at bedtime    MEDICATIONS  (PRN):  acetaminophen    Suspension .. 650 milliGRAM(s) Oral every 6 hours PRN Temp greater or equal to 38C (100.4F), Mild Pain (1 - 3)  glycopyrrolate Injectable 0.1 milliGRAM(s) IV Push three times a day PRN secretions  oxyCODONE    IR 5 milliGRAM(s) Oral every 6 hours PRN Moderate Pain (4 - 6)  oxyCODONE    IR 10 milliGRAM(s) Oral every 6 hours PRN Severe Pain (7 - 10)  sodium chloride 0.9% lock flush 10 milliLiter(s) IV Push every 1 hour PRN Pre/post blood products, medications, blood draw, and to maintain line patency      Allergies    Depakote (Other)  Seroquel (Other (Severe))    Intolerances      Review of Systems:  Constitutional: No fever  Eyes: No blurry vision  Neuro: No tremors  HEENT: No pain  Cardiovascular: No chest pain, palpitations  Respiratory: No SOB, no cough  GI: No nausea, vomiting, abdominal pain  : No dysuria  Skin: no rash  Psych: no depression  Endocrine: no polyuria, polydipsia  Hem/lymph: no swelling  Osteoporosis: no fractures    ALL OTHER SYSTEMS REVIEWED AND NEGATIVE    UNABLE TO OBTAIN    PHYSICAL EXAM:  VITALS: T(C): 38 (06-26-20 @ 08:00)  T(F): 100.4 (06-26-20 @ 08:00), Max: 100.4 (06-26-20 @ 08:00)  HR: 97 (06-26-20 @ 08:58) (83 - 115)  BP: 126/67 (06-26-20 @ 08:00) (107/59 - 153/74)  RR:  (14 - 28)  SpO2:  (99% - 100%)  Wt(kg): --  GENERAL: NAD  HEENT:  Atraumatic, Normocephalic, moist mucous membranes  RESPIRATORY: Clear to auscultation bilaterally; No rales, rhonchi, wheezing, or rubs  CARDIOVASCULAR: Regular rate and rhythm  GI: Soft, nontender, non distended, normal bowel sounds        POCT Blood Glucose.: 215 mg/dL (06-26-20 @ 05:35)  POCT Blood Glucose.: 84 mg/dL (06-25-20 @ 23:04)  POCT Blood Glucose.: 115 mg/dL (06-25-20 @ 17:43)  POCT Blood Glucose.: 199 mg/dL (06-25-20 @ 11:49)  POCT Blood Glucose.: 214 mg/dL (06-25-20 @ 04:17)  POCT Blood Glucose.: 218 mg/dL (06-24-20 @ 23:28)  POCT Blood Glucose.: 296 mg/dL (06-24-20 @ 17:17)  POCT Blood Glucose.: 283 mg/dL (06-24-20 @ 17:14)  POCT Blood Glucose.: 188 mg/dL (06-24-20 @ 11:48)  POCT Blood Glucose.: 149 mg/dL (06-24-20 @ 05:17)  POCT Blood Glucose.: 128 mg/dL (06-23-20 @ 23:01)  POCT Blood Glucose.: 145 mg/dL (06-23-20 @ 17:02)  POCT Blood Glucose.: 249 mg/dL (06-23-20 @ 11:15)                  Thyroid Function Tests:  06-11 @ 13:58 TSH 8.26 FreeT4 -- T3 55 Anti TPO -- Anti Thyroglobulin Ab -- TSI --  06-04 @ 01:58 TSH -- FreeT4 2.7 T3 -- Anti TPO -- Anti Thyroglobulin Ab -- TSI --

## 2020-06-26 NOTE — PROGRESS NOTE ADULT - SUBJECTIVE AND OBJECTIVE BOX
SUMMARY: 63 yo F with past medical hx of hypothyroidism, HTN, IDDM, COPD/CHRIS on home O2, RA (on Prednisone), SBO s/p ex lap with lysis of adhesions c/b evisceration,  recent hospitalization in 3/2020 with encephalopathy, significantly hypothyroid (, without myxedema coma), small (stable) right sided SDH, UTI, catatonic reaction to VPA (now resolved) and DKA and psychiatric issues, 5/2020 hospitalization for GIB, Emphysematous cystitis, endometritis, Sigmoid colitis, morbid obesity, functional quadriplegia presented on 5/30 with AMS and was found to have L frontal IPH. Per report, patient seized at home prior to presentation, but has not noted to be seizing during the hospitalization. Continued Goals of care discussion with family; palliative and ethics involved    HCP: Norris Rush, Son [phone]; Daughter, Heavenly Schmidt [phone]; son asks that only him or his sister, Heavenly be contacted for patient updates/information as they are both proxy's for patient (29 May 2020 23:41)    ADMISSION SCORES:  GCS: 5 [E2 VNT M3]  ICH: 2    OVERNIGHT/ Early am events-  None    ICU Vital Signs Last 24 Hrs  T(C): 38 (26 Jun 2020 08:00), Max: 38 (26 Jun 2020 08:00)  T(F): 100.4 (26 Jun 2020 08:00), Max: 100.4 (26 Jun 2020 08:00)  HR: 97 (26 Jun 2020 08:58) (83 - 115)  BP: 126/67 (26 Jun 2020 08:00) (107/59 - 145/65)  BP(mean): 90 (26 Jun 2020 08:00) (77 - 94)  ABP: --  ABP(mean): --  RR: 20 (26 Jun 2020 08:00) (14 - 28)  SpO2: 100% (26 Jun 2020 08:58) (99% - 100%)    I&O's Detail    25 Jun 2020 07:01  -  26 Jun 2020 07:00  --------------------------------------------------------  IN:    Enteral Tube Flush: 50 mL    Glucerna: 1560 mL  Total IN: 1610 mL    OUT:    Intermittent Catheterization - Urethral: 1825 mL  Total OUT: 1825 mL    Total NET: -215 mL      26 Jun 2020 07:01  -  26 Jun 2020 11:31  --------------------------------------------------------  IN:    Glucerna: 260 mL  Total IN: 260 mL    OUT:  Total OUT: 0 mL    Total NET: 260 mL    MEDICATIONS  (STANDING):  amLODIPine   Tablet 10 milliGRAM(s) Oral daily  chlorhexidine 0.12% Liquid 15 milliLiter(s) Oral Mucosa every 12 hours  chlorhexidine 4% Liquid 1 Application(s) Topical <User Schedule>  dextrose 5%. 1000 milliLiter(s) (50 mL/Hr) IV Continuous <Continuous>  dextrose 50% Injectable 25 Gram(s) IV Push once  doxazosin 2 milliGRAM(s) Oral at bedtime  enoxaparin Injectable 30 milliGRAM(s) SubCutaneous two times a day  insulin lispro (HumaLOG) corrective regimen sliding scale   SubCutaneous every 6 hours  insulin NPH human recombinant 10 Unit(s) SubCutaneous <User Schedule>  insulin NPH human recombinant 35 Unit(s) SubCutaneous <User Schedule>  insulin NPH human recombinant 46 Unit(s) SubCutaneous <User Schedule>  lacosamide Solution 200 milliGRAM(s) Oral two times a day  levETIRAcetam  Solution 1000 milliGRAM(s) Oral two times a day  levothyroxine 150 MICROGram(s) Oral daily  metoprolol tartrate 25 milliGRAM(s) Oral every 6 hours  pantoprazole   Suspension 40 milliGRAM(s) Oral daily  predniSONE   Tablet 15 milliGRAM(s) Oral <User Schedule>  predniSONE   Tablet 5 milliGRAM(s) Oral at bedtime  simvastatin 20 milliGRAM(s) Oral at bedtime    MEDICATIONS  (PRN):  acetaminophen    Suspension .. 650 milliGRAM(s) Oral every 6 hours PRN Temp greater or equal to 38C (100.4F), Mild Pain (1 - 3)  glycopyrrolate Injectable 0.1 milliGRAM(s) IV Push three times a day PRN secretions  oxyCODONE    IR 5 milliGRAM(s) Oral every 6 hours PRN Moderate Pain (4 - 6)  oxyCODONE    IR 10 milliGRAM(s) Oral every 6 hours PRN Severe Pain (7 - 10)  sodium chloride 0.9% lock flush 10 milliLiter(s) IV Push every 1 hour PRN Pre/post blood products, medications, blood draw, and to maintain line patency      DIET: Glucerna @65     LABS:           No recent labs- last performed 6/22.              PHYSICAL EXAM:  Neurological:  intubated, EO spont, does not track, disconjugated gaze, pupils 3mm reactive, +cough/gag, not follow commands, upper extremities tremors when stimulated, LE TF/no movement  to noxious   Cardiovascular: +s1, s2  Respiratory: clear to auscultation b/l  Gastrointestinal: soft, non-distended, non-tender  abd: soft, non-tender, nl bs ,+ skin lesion  Extremities: Warm, dry  + skin tear on buttocks

## 2020-06-26 NOTE — PROGRESS NOTE ADULT - ASSESSMENT
65 yo F with uncontrolled T2DM with no known complications, hypothyroidism/ul HTN/ COPD and RA on chronic steroids here with mental status changes associated with shaking found to have a left frontal parenchymal hematoma with mass effect on the left lateral ventricle and intraventricular extension with blood layering in the occipital horns s/p intubation on TF, awaiting family GOC decisions. BG values elevated after only receiving 1/2 of NPH dose w/interruption in TF yesterday. Trending down today so will observe for another 24 hours before making any further changes to insulin regimen. BG goal (100-200mg/dl). 63 yo F with uncontrolled T2DM with no known complications, hypothyroidism/ul HTN/ COPD and RA on chronic steroids here with mental status changes associated with shaking found to have a left frontal parenchymal hematoma with mass effect on the left lateral ventricle and intraventricular extension with blood layering in the occipital horns s/p intubation on TF, awaiting family GOC decisions. BG values elevated this AM as patient did not receive NPH due to FS 84 last night.  BG goal (100-200mg/dl).

## 2020-06-26 NOTE — PROGRESS NOTE ADULT - ASSESSMENT
NSCU ATTENDING -- ADDITIONAL PROGRESS NOTE    Nighttime rounds were performed -- please refer to earlier Progress Note for HPI details.    T(C): 37.6 (06-26-20 @ 00:00), Max: 37.7 (06-25-20 @ 20:00)  HR: 93 (06-26-20 @ 00:39) (83 - 118)  BP: 107/59 (06-26-20 @ 00:00) (107/59 - 153/99)  RR: 17 (06-26-20 @ 00:00) (14 - 27)  SpO2: 100% (06-26-20 @ 00:39) (99% - 100%)  Wt(kg): --    Relevant labwork and imaging reviewed.    We still await definitive decisions from the family.

## 2020-06-26 NOTE — PROGRESS NOTE ADULT - SUBJECTIVE AND OBJECTIVE BOX
NSCU ATTENDING -- ADDITIONAL PROGRESS NOTE    Nighttime rounds were performed -- please refer to earlier Progress Note for HPI details.    T(C): 37.8 (06-26-20 @ 16:00), Max: 38 (06-26-20 @ 08:00)  HR: 97 (06-26-20 @ 21:32) (88 - 115)  BP: 136/82 (06-26-20 @ 20:00) (107/59 - 136/82)  RR: 16 (06-26-20 @ 20:00) (16 - 28)  SpO2: 93% (06-26-20 @ 21:32) (93% - 100%)  Wt(kg): --    Relevant labwork and imaging reviewed.    Nevertheless, we await the family.

## 2020-06-26 NOTE — PROGRESS NOTE ADULT - PROBLEM SELECTOR PLAN 1
-test BG Qe6h  -c/w NPH 46 units @ 6am  -c/w NPH 35 units @ 12pm  -c/w NPH 10 units @ 6pm/12am  -hold NPH if Tube feeds off. Can give 50% of dose if BG less than 100mg/dl)  -Please notify endocrine if any changes in steroid regimen or TF regimen so we can adjust insulin regimen accordingly. -test BG Q6h  -c/w NPH 46 units @ 6am  -c/w NPH 35 units @ 12pm  -c/w NPH 10 units @ 6pm/12am  -hold NPH if Tube feeds off. Can give 50% of dose if BG less than 100mg/dl)  -Please notify endocrine if any changes in steroid regimen or TF regimen so we can adjust insulin regimen accordingly.

## 2020-06-26 NOTE — PROGRESS NOTE ADULT - PROBLEM SELECTOR PLAN 2
Plan: -Levothyroxine 150mcg per PEG daily  -Hold TF 1 hour prior and after levothyroxine given. Do not give with other medications.    discussed w/pt's family member and RN  pager: [phone]   cell: [phone]  office: [phone]    -I spent a total time of 15 mins with the patient at bedside/on unit of which more than 50% of time was spent on counseling/coordination of care. Plan: -Levothyroxine 150mcg per PEG daily  -Hold TF 1 hour prior and after levothyroxine given. Do not give with other medications.

## 2020-06-26 NOTE — PROGRESS NOTE ADULT - ATTENDING COMMENTS
Navya Davidson (pager [phone])  On evenings and weekends, please call [phone] or page endocrine fellow on call.   Please note that this patient may be followed by different provider tomorrow. If no answer, contact endocrine fellow on call.

## 2020-06-27 LAB
GLUCOSE BLDC GLUCOMTR-MCNC: 128 MG/DL — HIGH (ref 70–99)
GLUCOSE BLDC GLUCOMTR-MCNC: 178 MG/DL — HIGH (ref 70–99)
GLUCOSE BLDC GLUCOMTR-MCNC: 214 MG/DL — HIGH (ref 70–99)
GLUCOSE BLDC GLUCOMTR-MCNC: 95 MG/DL — SIGNIFICANT CHANGE UP (ref 70–99)
SARS-COV-2 RNA SPEC QL NAA+PROBE: SIGNIFICANT CHANGE UP

## 2020-06-27 PROCEDURE — 99232 SBSQ HOSP IP/OBS MODERATE 35: CPT

## 2020-06-27 PROCEDURE — 99291 CRITICAL CARE FIRST HOUR: CPT

## 2020-06-27 PROCEDURE — 99292 CRITICAL CARE ADDL 30 MIN: CPT

## 2020-06-27 RX ORDER — TRAMADOL HYDROCHLORIDE 50 MG/1
50 TABLET ORAL EVERY 4 HOURS
Refills: 0 | Status: DISCONTINUED | OUTPATIENT
Start: 2020-06-27 | End: 2020-07-04

## 2020-06-27 RX ORDER — HUMAN INSULIN 100 [IU]/ML
32 INJECTION, SUSPENSION SUBCUTANEOUS
Refills: 0 | Status: DISCONTINUED | OUTPATIENT
Start: 2020-06-27 | End: 2020-06-28

## 2020-06-27 RX ADMIN — ENOXAPARIN SODIUM 30 MILLIGRAM(S): 100 INJECTION SUBCUTANEOUS at 17:19

## 2020-06-27 RX ADMIN — SIMVASTATIN 20 MILLIGRAM(S): 20 TABLET, FILM COATED ORAL at 21:52

## 2020-06-27 RX ADMIN — LACOSAMIDE 200 MILLIGRAM(S): 50 TABLET ORAL at 05:26

## 2020-06-27 RX ADMIN — CHLORHEXIDINE GLUCONATE 15 MILLILITER(S): 213 SOLUTION TOPICAL at 17:18

## 2020-06-27 RX ADMIN — Medication 25 MILLIGRAM(S): at 12:24

## 2020-06-27 RX ADMIN — LEVETIRACETAM 1000 MILLIGRAM(S): 250 TABLET, FILM COATED ORAL at 05:27

## 2020-06-27 RX ADMIN — LACOSAMIDE 200 MILLIGRAM(S): 50 TABLET ORAL at 17:19

## 2020-06-27 RX ADMIN — LEVETIRACETAM 1000 MILLIGRAM(S): 250 TABLET, FILM COATED ORAL at 17:19

## 2020-06-27 RX ADMIN — Medication 25 MILLIGRAM(S): at 05:27

## 2020-06-27 RX ADMIN — Medication 15 MILLIGRAM(S): at 05:27

## 2020-06-27 RX ADMIN — CHLORHEXIDINE GLUCONATE 1 APPLICATION(S): 213 SOLUTION TOPICAL at 21:53

## 2020-06-27 RX ADMIN — Medication 5 MILLIGRAM(S): at 21:53

## 2020-06-27 RX ADMIN — ENOXAPARIN SODIUM 30 MILLIGRAM(S): 100 INJECTION SUBCUTANEOUS at 05:26

## 2020-06-27 RX ADMIN — PANTOPRAZOLE SODIUM 40 MILLIGRAM(S): 20 TABLET, DELAYED RELEASE ORAL at 12:24

## 2020-06-27 RX ADMIN — HUMAN INSULIN 10 UNIT(S): 100 INJECTION, SUSPENSION SUBCUTANEOUS at 17:20

## 2020-06-27 RX ADMIN — HUMAN INSULIN 46 UNIT(S): 100 INJECTION, SUSPENSION SUBCUTANEOUS at 05:28

## 2020-06-27 RX ADMIN — ROBINUL 0.1 MILLIGRAM(S): 0.2 INJECTION INTRAMUSCULAR; INTRAVENOUS at 12:26

## 2020-06-27 RX ADMIN — CHLORHEXIDINE GLUCONATE 15 MILLILITER(S): 213 SOLUTION TOPICAL at 05:25

## 2020-06-27 RX ADMIN — Medication 4: at 11:41

## 2020-06-27 RX ADMIN — HUMAN INSULIN 32 UNIT(S): 100 INJECTION, SUSPENSION SUBCUTANEOUS at 11:42

## 2020-06-27 RX ADMIN — Medication 25 MILLIGRAM(S): at 17:19

## 2020-06-27 RX ADMIN — Medication 2: at 05:28

## 2020-06-27 RX ADMIN — Medication 150 MICROGRAM(S): at 05:27

## 2020-06-27 RX ADMIN — Medication 2 MILLIGRAM(S): at 22:42

## 2020-06-27 NOTE — PROGRESS NOTE ADULT - ASSESSMENT
65 yo F with uncontrolled T2DM with no known complications, hypothyroidism/ul HTN/ COPD and RA on chronic steroids here with mental status changes associated with shaking found to have a left frontal parenchymal hematoma with mass effect on the left lateral ventricle and intraventricular extension with blood layering in the occipital horns s/p intubation on TF, awaiting family GOC decisions. BG goal (100-180mg/dl)

## 2020-06-27 NOTE — PROGRESS NOTE ADULT - PROBLEM SELECTOR PLAN 1
-test BG Q6h  --c/w NPH 46 units @ 6am  -Decrease NPH 32 units @ 12pm  -c/w NPH 10 units @ 6pm/12am  -hold NPH if Tube feeds off. Can give 50% of dose if BG less than 100mg/dl)  -Please notify endocrine if any changes in steroid regimen or TF regimen so we can adjust insulin regimen accordingly.

## 2020-06-27 NOTE — PROGRESS NOTE ADULT - ASSESSMENT
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    Trach and PEG being discussed with family. no decision yet   Activity: [] OOB as tolerated [] Bedrest [X] PT [X] OT [] PMNR      PULM:  Intubated since 5/29- Day 30 of intubation.- awaiting family decision on goals of care - not likely to withstand extubation.  H/O of COPD   CPAP as tolerated.   SATS >96 %         CV: Intermittently tachy   Keep -160  Anasarca- [id number]  HTN: Continue norvasc   sinus tachycardia, metoprolol to 25 q6h  Statin for HLD        RENAL:  IVL. Monitor Is/Os  iNCONT  on cardura for urinary retention      GI:  Diet: On Glucerna OGT   Continue Banana flakes and metamucil , LBM 6/27  GI prophylaxis [] not indicated [X] PO PPI [] other:      ENDO: S/P hyperglycemia - IR DM  Endo Following for adjustment of NPH; will follow up on sugar levels  Continue PO synthroid  Continue prednisone 15mg, 5 mg at bedtime (was on it at home for RA). Likely etiology of bruising  Goal euglycemia (-180)      HEME/ONC:   On SQL for DVT ppx- 30mg SQ q12.  VTE prophylaxis: [X] SCDs [X] chemoprophylaxis [X] high risk of DVT/PE on admission due to: bed bound at baseline  LE doppler 5/30- No DVT    ID:  Afebrile   Ertapnem for ESBL found growing in urine culture on 6/4 - ended on  - 6/14    MISC: Awaiting family decision regarding trach/PEG vs full code.     SOCIAL/FAMILY:  [X] updated family via phone  [] family meeting    CODE STATUS:  [X] Full Code     DISPOSITION:  [X] ICU    [X] Patient is at high risk of neurologic deterioration/death due to: resp failure, seizures, hemorrhage.

## 2020-06-27 NOTE — PROGRESS NOTE ADULT - SUBJECTIVE AND OBJECTIVE BOX
SUMMARY: 65 yo F with past medical hx of hypothyroidism, HTN, IDDM, COPD/CHRIS on home O2, RA (on Prednisone), SBO s/p ex lap with lysis of adhesions c/b evisceration,  recent hospitalization in 3/2020 with encephalopathy, significantly hypothyroid (, without myxedema coma), small (stable) right sided SDH, UTI, catatonic reaction to VPA (now resolved) and DKA and psychiatric issues, 5/2020 hospitalization for GIB, Emphysematous cystitis, endometritis, Sigmoid colitis, morbid obesity, functional quadriplegia presented on 5/30 with AMS and was found to have L frontal IPH. Per report, patient seized at home prior to presentation, but has not noted to be seizing during the hospitalization. Continued Goals of care discussion with family; palliative and ethics involved    HCP: Norris Rush, Son [phone]; Daughter, Heavenly Schmidt [phone]; son asks that only him or his sister, Heavenly be contacted for patient updates/information as they are both proxy's for patient (29 May 2020 23:41)    ADMISSION SCORES:  GCS: 5 [E2 VNT M3]  ICH: 2    OVERNIGHT/ Early am events-  None    ICU Vital Signs Last 24 Hrs  T(C): 37.9 (27 Jun 2020 08:35), Max: 37.9 (27 Jun 2020 08:35)  T(F): 100.2 (27 Jun 2020 08:35), Max: 100.2 (27 Jun 2020 08:35)  HR: 97 (27 Jun 2020 11:00) (88 - 106)  BP: 106/60 (27 Jun 2020 10:00) (103/58 - 136/82)  BP(mean): 78 (27 Jun 2020 10:00) (76 - 106)  ABP: --  ABP(mean): --  RR: 18 (27 Jun 2020 11:00) (15 - 22)  SpO2: 99% (27 Jun 2020 11:00) (93% - 100%)    I&O's Detail    26 Jun 2020 07:01  -  27 Jun 2020 07:00  --------------------------------------------------------  IN:    Enteral Tube Flush: 350 mL    Glucerna: 1560 mL  Total IN: 1910 mL    OUT:    Intermittent Catheterization - Urethral: 1650 mL  Total OUT: 1650 mL    Total NET: 260 mL      27 Jun 2020 07:01  -  27 Jun 2020 11:34  --------------------------------------------------------  IN:    Glucerna: 260 mL  Total IN: 260 mL    OUT:  Total OUT: 0 mL    Total NET: 260 mL    MEDICATIONS  (STANDING):  amLODIPine   Tablet 10 milliGRAM(s) Oral daily  chlorhexidine 0.12% Liquid 15 milliLiter(s) Oral Mucosa every 12 hours  chlorhexidine 4% Liquid 1 Application(s) Topical <User Schedule>  dextrose 5%. 1000 milliLiter(s) (50 mL/Hr) IV Continuous <Continuous>  dextrose 50% Injectable 25 Gram(s) IV Push once  doxazosin 2 milliGRAM(s) Oral at bedtime  enoxaparin Injectable 30 milliGRAM(s) SubCutaneous two times a day  insulin lispro (HumaLOG) corrective regimen sliding scale   SubCutaneous every 6 hours  insulin NPH human recombinant 10 Unit(s) SubCutaneous <User Schedule>  insulin NPH human recombinant 46 Unit(s) SubCutaneous <User Schedule>  insulin NPH human recombinant 32 Unit(s) SubCutaneous <User Schedule>  lacosamide Solution 200 milliGRAM(s) Oral two times a day  levETIRAcetam  Solution 1000 milliGRAM(s) Oral two times a day  levothyroxine 150 MICROGram(s) Oral daily  metoprolol tartrate 25 milliGRAM(s) Oral every 6 hours  pantoprazole   Suspension 40 milliGRAM(s) Oral daily  predniSONE   Tablet 15 milliGRAM(s) Oral <User Schedule>  predniSONE   Tablet 5 milliGRAM(s) Oral at bedtime  simvastatin 20 milliGRAM(s) Oral at bedtime    MEDICATIONS  (PRN):  acetaminophen    Suspension .. 650 milliGRAM(s) Oral every 6 hours PRN Temp greater or equal to 38C (100.4F), Mild Pain (1 - 3)  glycopyrrolate Injectable 0.1 milliGRAM(s) IV Push three times a day PRN secretions  sodium chloride 0.9% lock flush 10 milliLiter(s) IV Push every 1 hour PRN Pre/post blood products, medications, blood draw, and to maintain line patency    DIET: Glucerna @65     LABS:           No recent labs- last performed 6/22.              PHYSICAL EXAM:  Neurological:  intubated, EO spont, does not track, disconjugated gaze, pupils 3mm reactive, +cough/gag, not follow commands, upper extremities tremors when stimulated, LE trace movement  to noxious   Cardiovascular: +s1, s2, borderline tachycardic.   Pulmonary: No respiratory distress  Gastrointestinal: soft, non-distended, non-tender  abd: soft, non-tender, nl bs ,+ skin lesion  Extremities: Warm, dry  + skin tear on buttocks

## 2020-06-27 NOTE — PROGRESS NOTE ADULT - NS MD NEURO CONDITIONS_ENCEPHAL
Neurological

## 2020-06-27 NOTE — PROGRESS NOTE ADULT - ASSESSMENT
Left frontal intracerebral hemorrhage, vegetative state  Continued goals of care conversation, palliative and ethics involved    remains intubated  14/450/5/30%, does not consistently tolerate pressure support trials  per Norris Rush, Son, patient took tramadol and gabapentin for arthritic pain at home  add tramadol prn for pain   continue keppra/vimpat for seizures  -160  tube feeding, bowel regimen    Spoke with Norris Rush on the phone--asked for pain control meds to be added, stated that he's upset regarding being told that from some people ? ?nursing that they're not able to provide him or discuss regarding his mother's care  Mr. Rush understands that his mother has devastating neurological injury and that decisions regarding her care need to be made... Left frontal intracerebral hemorrhage, vegetative state  Continued goals of care conversation, palliative and ethics involved    remains intubated  14/450/5/30%, does not consistently tolerate pressure support trials  per Norris Rush, Son, patient took tramadol and gabapentin for arthritic pain at home  add tramadol prn for pain   continue keppra/vimpat for seizures  -160  tube feeding, bowel regimen    Spoke with Norris Rush on the phone--asked for pain control meds to be added, stated that he's upset regarding being told that from some people ? ?nursing that they're not able to provide him or discuss information regarding his mother's care. Added tramadol prn, assured Mr Rush that he and his sister Heavenly are primary contacts we have for the patient.   Mr. Rush understands that his mother has devastating neurological injury and that decisions regarding her care need to be made...

## 2020-06-27 NOTE — PROGRESS NOTE ADULT - SUBJECTIVE AND OBJECTIVE BOX
remains in poor neurological state    vitals/labs/meds reviewed  intubated, EO spont, does not track, disconjugated gaze, pupils 3mm reactive, +cough/gag, not follow commands, upper extremities tremors when stimulated, LE trace movement  to noxious

## 2020-06-27 NOTE — PROGRESS NOTE ADULT - PROBLEM SELECTOR PLAN 2
-Levothyroxine 150mcg per PEG daily  -Hold TF 1 hour prior and after levothyroxine given. Do not give with other medications.    call w/any questions  pager: [phone]  cell: [phone]  office: [phone]    -I spent a total time of 25 mins with the patient at bedside/on unit of which more than 50% of time was spent on counseling/coordination of care.

## 2020-06-27 NOTE — PROGRESS NOTE ADULT - SUBJECTIVE AND OBJECTIVE BOX
Diabetes Follow up note:    Chief complaint: T2DM/steroid induced hyperglycemia    Interval Hx: BG values 90s-low 200s over past 24 hours. Pt remains unresponsive, intubated. Continuous tube feeds infusing @ goal when at bedside.     Review of Systems:  unable to provide ROS. unresponsive.   MEDS:    insulin lispro (HumaLOG) corrective regimen sliding scale   SubCutaneous every 6 hours  insulin NPH human recombinant 10 Unit(s) SubCutaneous <User Schedule>  insulin NPH human recombinant 46 Unit(s) SubCutaneous <User Schedule>  insulin NPH human recombinant 32 Unit(s) SubCutaneous <User Schedule>  levothyroxine 150 MICROGram(s) Oral daily  predniSONE   Tablet 15 milliGRAM(s) Oral <User Schedule>  predniSONE   Tablet 5 milliGRAM(s) Oral at bedtime  simvastatin 20 milliGRAM(s) Oral at bedtime      Allergies    Depakote (Other)  Seroquel (Other (Severe))        PE:  General: Female lying in bed. Intubated  Vital Signs Last 24 Hrs  T(C): 37.9 (27 Jun 2020 08:35), Max: 37.9 (27 Jun 2020 08:35)  T(F): 100.2 (27 Jun 2020 08:35), Max: 100.2 (27 Jun 2020 08:35)  HR: 97 (27 Jun 2020 11:00) (88 - 106)  BP: 106/60 (27 Jun 2020 10:00) (103/58 - 136/82)  BP(mean): 78 (27 Jun 2020 10:00) (76 - 106)  RR: 18 (27 Jun 2020 11:00) (15 - 22)  SpO2: 99% (27 Jun 2020 11:00) (93% - 100%)  HEENT: ETT in place. On vent.   Abd: Soft, NT,ND, Obese.   Extremities: Warm. + edema  Neuro: unresponsive     LABS:  POCT Blood Glucose.: 178 mg/dL (06-27-20 @ 05:22)  POCT Blood Glucose.: 95 mg/dL (06-26-20 @ 23:22)  POCT Blood Glucose.: 127 mg/dL (06-26-20 @ 17:32)  POCT Blood Glucose.: 216 mg/dL (06-26-20 @ 11:58)  POCT Blood Glucose.: 215 mg/dL (06-26-20 @ 05:35)  POCT Blood Glucose.: 84 mg/dL (06-25-20 @ 23:04)  POCT Blood Glucose.: 115 mg/dL (06-25-20 @ 17:43)  POCT Blood Glucose.: 199 mg/dL (06-25-20 @ 11:49)  POCT Blood Glucose.: 214 mg/dL (06-25-20 @ 04:17)  POCT Blood Glucose.: 218 mg/dL (06-24-20 @ 23:28)  POCT Blood Glucose.: 296 mg/dL (06-24-20 @ 17:17)  POCT Blood Glucose.: 283 mg/dL (06-24-20 @ 17:14)  POCT Blood Glucose.: 188 mg/dL (06-24-20 @ 11:48)                  Thyroid Function Tests:  06-11 @ 13:58 TSH 8.26 FreeT4 -- T3 55 Anti TPO -- Anti Thyroglobulin Ab -- TSI --  06-04 @ 01:58 TSH -- FreeT4 2.7 T3 -- Anti TPO -- Anti Thyroglobulin Ab -- TSI --      A1C with Estimated Average Glucose Result: 7.4 % (05-30-20 @ 04:43)  A1C with Estimated Average Glucose Result: 6.4 % (05-14-20 @ 16:23)  A1C with Estimated Average Glucose Result: 6.5 % (05-13-20 @ 08:24)  Hemoglobin A1C, Whole Blood: 9.0 % (01-28-20 @ 19:30)  Hemoglobin A1C, Whole Blood: 9.2 % (01-27-20 @ 17:36)          Contact number: beeper [phone] or [phone]

## 2020-06-27 NOTE — CHART NOTE - NSCHARTNOTEFT_GEN_A_CORE
Had left a VM on Ms. Heavenly Akins's cellphone asking for a callback. Mr. Pisano called the unit and asked to speak  to the person who called. When called back ([phone]), Norris stated his sister was working today, and asked him to call the hospital and ask what the call was about. He was mad that nobody had spoken to him for 2 weeks, and that it was not right/ "illegal" for the hospital to unilaterally take his name away from discussion regarding further care. He stated he had visited his mother upstairs yesterday, and was upset that the nurses wouldn't tell him the medications that were being given to his mother, and he believes this is "illegal". He was upset that the caller had not taken the efforts to check paperwork that was given to the hospital, that clearly named both him and his sister Heavenly as HCPs and that he was going to get a  to melody the hospital for "taking his right to make decisions away". When updated about his mother's poor and unchanged neurological status and that she has been intubated for nearly 4 weeks, he stated he hadn't been provided any information for 2 weeks and that he can't make a decision since he wasn't updated. He was asked to speak to his sister, and provide the decision soon. He stated he would likely get back on Monday. Re-iterated that it's not safe for his mother to be intubated for so long.

## 2020-06-28 LAB
ANION GAP SERPL CALC-SCNC: 11 MMOL/L — SIGNIFICANT CHANGE UP (ref 5–17)
BUN SERPL-MCNC: 28 MG/DL — HIGH (ref 7–23)
CALCIUM SERPL-MCNC: 9.3 MG/DL — SIGNIFICANT CHANGE UP (ref 8.4–10.5)
CHLORIDE SERPL-SCNC: 109 MMOL/L — HIGH (ref 96–108)
CO2 SERPL-SCNC: 25 MMOL/L — SIGNIFICANT CHANGE UP (ref 22–31)
CREAT SERPL-MCNC: 0.71 MG/DL — SIGNIFICANT CHANGE UP (ref 0.5–1.3)
GLUCOSE BLDC GLUCOMTR-MCNC: 132 MG/DL — HIGH (ref 70–99)
GLUCOSE BLDC GLUCOMTR-MCNC: 188 MG/DL — HIGH (ref 70–99)
GLUCOSE BLDC GLUCOMTR-MCNC: 215 MG/DL — HIGH (ref 70–99)
GLUCOSE BLDC GLUCOMTR-MCNC: 222 MG/DL — HIGH (ref 70–99)
GLUCOSE SERPL-MCNC: 96 MG/DL — SIGNIFICANT CHANGE UP (ref 70–99)
HCT VFR BLD CALC: 31.4 % — LOW (ref 34.5–45)
HGB BLD-MCNC: 9.5 G/DL — LOW (ref 11.5–15.5)
MAGNESIUM SERPL-MCNC: 2.3 MG/DL — SIGNIFICANT CHANGE UP (ref 1.6–2.6)
MCHC RBC-ENTMCNC: 29.6 PG — SIGNIFICANT CHANGE UP (ref 27–34)
MCHC RBC-ENTMCNC: 30.3 GM/DL — LOW (ref 32–36)
MCV RBC AUTO: 97.8 FL — SIGNIFICANT CHANGE UP (ref 80–100)
NRBC # BLD: 1 /100 WBCS — HIGH (ref 0–0)
PHOSPHATE SERPL-MCNC: 4.5 MG/DL — SIGNIFICANT CHANGE UP (ref 2.5–4.5)
PLATELET # BLD AUTO: 316 K/UL — SIGNIFICANT CHANGE UP (ref 150–400)
POTASSIUM SERPL-MCNC: 4.2 MMOL/L — SIGNIFICANT CHANGE UP (ref 3.5–5.3)
POTASSIUM SERPL-SCNC: 4.2 MMOL/L — SIGNIFICANT CHANGE UP (ref 3.5–5.3)
RBC # BLD: 3.21 M/UL — LOW (ref 3.8–5.2)
RBC # FLD: 18.9 % — HIGH (ref 10.3–14.5)
SODIUM SERPL-SCNC: 145 MMOL/L — SIGNIFICANT CHANGE UP (ref 135–145)
WBC # BLD: 10.88 K/UL — HIGH (ref 3.8–10.5)
WBC # FLD AUTO: 10.88 K/UL — HIGH (ref 3.8–10.5)

## 2020-06-28 PROCEDURE — 99292 CRITICAL CARE ADDL 30 MIN: CPT

## 2020-06-28 PROCEDURE — 99291 CRITICAL CARE FIRST HOUR: CPT

## 2020-06-28 PROCEDURE — 99232 SBSQ HOSP IP/OBS MODERATE 35: CPT

## 2020-06-28 RX ORDER — HUMAN INSULIN 100 [IU]/ML
8 INJECTION, SUSPENSION SUBCUTANEOUS
Refills: 0 | Status: DISCONTINUED | OUTPATIENT
Start: 2020-06-28 | End: 2020-06-29

## 2020-06-28 RX ORDER — HUMAN INSULIN 100 [IU]/ML
48 INJECTION, SUSPENSION SUBCUTANEOUS
Refills: 0 | Status: DISCONTINUED | OUTPATIENT
Start: 2020-06-28 | End: 2020-06-29

## 2020-06-28 RX ORDER — HUMAN INSULIN 100 [IU]/ML
30 INJECTION, SUSPENSION SUBCUTANEOUS
Refills: 0 | Status: DISCONTINUED | OUTPATIENT
Start: 2020-06-28 | End: 2020-06-30

## 2020-06-28 RX ADMIN — Medication 25 MILLIGRAM(S): at 23:02

## 2020-06-28 RX ADMIN — HUMAN INSULIN 8 UNIT(S): 100 INJECTION, SUSPENSION SUBCUTANEOUS at 17:25

## 2020-06-28 RX ADMIN — HUMAN INSULIN 46 UNIT(S): 100 INJECTION, SUSPENSION SUBCUTANEOUS at 05:34

## 2020-06-28 RX ADMIN — CHLORHEXIDINE GLUCONATE 15 MILLILITER(S): 213 SOLUTION TOPICAL at 17:24

## 2020-06-28 RX ADMIN — LACOSAMIDE 200 MILLIGRAM(S): 50 TABLET ORAL at 17:23

## 2020-06-28 RX ADMIN — Medication 15 MILLIGRAM(S): at 05:33

## 2020-06-28 RX ADMIN — Medication 25 MILLIGRAM(S): at 11:44

## 2020-06-28 RX ADMIN — HUMAN INSULIN 8 UNIT(S): 100 INJECTION, SUSPENSION SUBCUTANEOUS at 23:04

## 2020-06-28 RX ADMIN — ENOXAPARIN SODIUM 30 MILLIGRAM(S): 100 INJECTION SUBCUTANEOUS at 05:34

## 2020-06-28 RX ADMIN — Medication 5 MILLIGRAM(S): at 23:02

## 2020-06-28 RX ADMIN — Medication 150 MICROGRAM(S): at 07:07

## 2020-06-28 RX ADMIN — HUMAN INSULIN 30 UNIT(S): 100 INJECTION, SUSPENSION SUBCUTANEOUS at 11:48

## 2020-06-28 RX ADMIN — CHLORHEXIDINE GLUCONATE 1 APPLICATION(S): 213 SOLUTION TOPICAL at 23:02

## 2020-06-28 RX ADMIN — CHLORHEXIDINE GLUCONATE 15 MILLILITER(S): 213 SOLUTION TOPICAL at 05:26

## 2020-06-28 RX ADMIN — ENOXAPARIN SODIUM 30 MILLIGRAM(S): 100 INJECTION SUBCUTANEOUS at 17:24

## 2020-06-28 RX ADMIN — Medication 4: at 11:45

## 2020-06-28 RX ADMIN — SIMVASTATIN 20 MILLIGRAM(S): 20 TABLET, FILM COATED ORAL at 23:02

## 2020-06-28 RX ADMIN — HUMAN INSULIN 30 UNIT(S): 100 INJECTION, SUSPENSION SUBCUTANEOUS at 11:49

## 2020-06-28 RX ADMIN — LEVETIRACETAM 1000 MILLIGRAM(S): 250 TABLET, FILM COATED ORAL at 17:24

## 2020-06-28 RX ADMIN — LEVETIRACETAM 1000 MILLIGRAM(S): 250 TABLET, FILM COATED ORAL at 05:35

## 2020-06-28 RX ADMIN — Medication 2 MILLIGRAM(S): at 23:03

## 2020-06-28 RX ADMIN — PANTOPRAZOLE SODIUM 40 MILLIGRAM(S): 20 TABLET, DELAYED RELEASE ORAL at 11:45

## 2020-06-28 RX ADMIN — Medication 2: at 17:25

## 2020-06-28 RX ADMIN — Medication 4: at 05:35

## 2020-06-28 RX ADMIN — LACOSAMIDE 200 MILLIGRAM(S): 50 TABLET ORAL at 05:33

## 2020-06-28 RX ADMIN — AMLODIPINE BESYLATE 10 MILLIGRAM(S): 2.5 TABLET ORAL at 05:26

## 2020-06-28 RX ADMIN — Medication 25 MILLIGRAM(S): at 05:35

## 2020-06-28 RX ADMIN — Medication 25 MILLIGRAM(S): at 17:24

## 2020-06-28 NOTE — PROGRESS NOTE ADULT - ASSESSMENT
Left frontal intracerebral hemorrhage, vegetative state  Continued goals of care conversation, palliative and ethics involved    remains intubated  14/450/5/30%, does not consistently tolerate pressure support trials  per Norris Rush, Son, patient took tramadol and gabapentin for arthritic pain at home  cont tramadol prn for pain   continue keppra/vimpat for seizures  -160  tube feeding, bowel regimen  NPH, BENJIE adjusted per endo, maintain euglycemia

## 2020-06-28 NOTE — PROGRESS NOTE ADULT - SUBJECTIVE AND OBJECTIVE BOX
SUMMARY: 63 yo F with past medical hx of hypothyroidism, HTN, IDDM, COPD/CHRIS on home O2, RA (on Prednisone), SBO s/p ex lap with lysis of adhesions c/b evisceration,  recent hospitalization in 3/2020 with encephalopathy, significantly hypothyroid (, without myxedema coma), small (stable) right sided SDH, UTI, catatonic reaction to VPA (now resolved) and DKA and psychiatric issues, 5/2020 hospitalization for GIB, Emphysematous cystitis, endometritis, Sigmoid colitis, morbid obesity, functional quadriplegia presented on 5/30 with AMS and was found to have L frontal IPH. Per report, patient seized at home prior to presentation, but has not noted to be seizing during the hospitalization. Continued Goals of care discussion with family; palliative and ethics involved    HCP: Norris Rush, Son [phone]; Daughter, Heavenly Schmidt [phone]; son asks that only him or his sister, Heavenly be contacted for patient updates/information as they are both proxy's for patient (29 May 2020 23:41)    ADMISSION SCORES:  GCS: 5 [E2 VNT M3]  ICH: 2    OVERNIGHT/ Early am events-  None    ICU Vital Signs Last 24 Hrs  T(C): 36.8 (28 Jun 2020 04:00), Max: 38 (27 Jun 2020 12:00)  T(F): 98.3 (28 Jun 2020 04:00), Max: 100.4 (27 Jun 2020 12:00)  HR: 101 (28 Jun 2020 06:00) (82 - 104)  BP: 139/73 (28 Jun 2020 06:00) (105/58 - 144/96)  BP(mean): 95 (28 Jun 2020 06:00) (76 - 109)  ABP: --  ABP(mean): --  RR: 21 (28 Jun 2020 06:00) (15 - 36)  SpO2: 100% (28 Jun 2020 06:00) (98% - 100%)    MEDICATIONS  (STANDING):  amLODIPine   Tablet 10 milliGRAM(s) Oral daily  chlorhexidine 0.12% Liquid 15 milliLiter(s) Oral Mucosa every 12 hours  chlorhexidine 4% Liquid 1 Application(s) Topical <User Schedule>  dextrose 5%. 1000 milliLiter(s) (50 mL/Hr) IV Continuous <Continuous>  dextrose 50% Injectable 25 Gram(s) IV Push once  doxazosin 2 milliGRAM(s) Oral at bedtime  enoxaparin Injectable 30 milliGRAM(s) SubCutaneous two times a day  insulin lispro (HumaLOG) corrective regimen sliding scale   SubCutaneous every 6 hours  insulin NPH human recombinant 10 Unit(s) SubCutaneous <User Schedule>  insulin NPH human recombinant 46 Unit(s) SubCutaneous <User Schedule>  insulin NPH human recombinant 32 Unit(s) SubCutaneous <User Schedule>  lacosamide Solution 200 milliGRAM(s) Oral two times a day  levETIRAcetam  Solution 1000 milliGRAM(s) Oral two times a day  levothyroxine 150 MICROGram(s) Oral daily  metoprolol tartrate 25 milliGRAM(s) Oral every 6 hours  pantoprazole   Suspension 40 milliGRAM(s) Oral daily  predniSONE   Tablet 15 milliGRAM(s) Oral <User Schedule>  predniSONE   Tablet 5 milliGRAM(s) Oral at bedtime  simvastatin 20 milliGRAM(s) Oral at bedtime    MEDICATIONS  (PRN):  acetaminophen    Suspension .. 650 milliGRAM(s) Oral every 6 hours PRN Temp greater or equal to 38C (100.4F), Mild Pain (1 - 3)  glycopyrrolate Injectable 0.1 milliGRAM(s) IV Push three times a day PRN secretions  sodium chloride 0.9% lock flush 10 milliLiter(s) IV Push every 1 hour PRN Pre/post blood products, medications, blood draw, and to maintain line patency  traMADol 50 milliGRAM(s) Oral every 4 hours PRN tachycardia, vent dyssynchrony    DIET: Glucerna @65     LABS:           No recent labs- last performed 6/22.              PHYSICAL EXAM:  Neurological:  intubated, EO spont, does not track, disconjugated gaze, pupils 3mm reactive, +cough/gag, not follow commands, upper extremities tremors when stimulated, LE trace movement  to noxious   Cardiovascular: +s1, s2, borderline tachycardic.   Pulmonary: No respiratory distress  Gastrointestinal: soft, non-distended, non-tender  abd: soft, non-tender, nl bs ,+ skin lesion  Extremities: Warm, dry  + skin tear on buttocks

## 2020-06-28 NOTE — PROGRESS NOTE ADULT - ASSESSMENT
65 yo F with uncontrolled T2DM with no known complications, hypothyroidism/ul HTN/ COPD and RA on chronic steroids here with mental status changes associated with shaking found to have a left frontal parenchymal hematoma with mass effect on the left lateral ventricle and intraventricular extension with blood layering in the occipital horns s/p intubation on TF, awaiting family GOC decisions. BG elevated today after midnight dose of NPH held for BG of 95mg/dl. Will adjust regimen. BG goal (100-200mg/dl).

## 2020-06-28 NOTE — PROGRESS NOTE ADULT - SUBJECTIVE AND OBJECTIVE BOX
Diabetes Follow up note:    Chief complaint: T2DM/steroid induced hyperglycemia    Interval Hx: FS 95mg/dl at midnight. NPH dose held, BG in 200s subsequently. Remains on continuous tube feeds. Intubated w/no change in mental status.     Review of Systems:  unable to provide ROS.   MEDS:  insulin lispro (HumaLOG) corrective regimen sliding scale   SubCutaneous every 6 hours  insulin NPH human recombinant 10 Unit(s) SubCutaneous <User Schedule>  insulin NPH human recombinant 46 Unit(s) SubCutaneous <User Schedule>  insulin NPH human recombinant 32 Unit(s) SubCutaneous <User Schedule>  levothyroxine 150 MICROGram(s) Oral daily  predniSONE   Tablet 15 milliGRAM(s) Oral <User Schedule>  predniSONE   Tablet 5 milliGRAM(s) Oral at bedtime  simvastatin 20 milliGRAM(s) Oral at bedtime      Allergies    Depakote (Other)  Seroquel (Other (Severe))        PE:  General: Female lying in bed. Intubated.   Vital Signs Last 24 Hrs  T(C): 37.4 (28 Jun 2020 08:00), Max: 38 (27 Jun 2020 12:00)  T(F): 99.3 (28 Jun 2020 08:00), Max: 100.4 (27 Jun 2020 12:00)  HR: 102 (28 Jun 2020 08:56) (82 - 104)  BP: 129/72 (28 Jun 2020 08:00) (105/58 - 144/96)  BP(mean): 104 (28 Jun 2020 08:00) (76 - 109)  RR: 17 (28 Jun 2020 08:00) (15 - 36)  SpO2: 100% (28 Jun 2020 08:56) (98% - 100%)  HEENT: ETT in place. On vent.   Abd: Soft, NT,ND, Obese.   Extremities: Warm  Neuro: unresponsive     LABS:  POCT Blood Glucose.: 222 mg/dL (06-28-20 @ 11:39)  POCT Blood Glucose.: 215 mg/dL (06-28-20 @ 05:24)  POCT Blood Glucose.: 95 mg/dL (06-27-20 @ 23:32)  POCT Blood Glucose.: 128 mg/dL (06-27-20 @ 16:54)  POCT Blood Glucose.: 214 mg/dL (06-27-20 @ 11:38)  POCT Blood Glucose.: 178 mg/dL (06-27-20 @ 05:22)  POCT Blood Glucose.: 95 mg/dL (06-26-20 @ 23:22)  POCT Blood Glucose.: 127 mg/dL (06-26-20 @ 17:32)  POCT Blood Glucose.: 216 mg/dL (06-26-20 @ 11:58)  POCT Blood Glucose.: 215 mg/dL (06-26-20 @ 05:35)  POCT Blood Glucose.: 84 mg/dL (06-25-20 @ 23:04)  POCT Blood Glucose.: 115 mg/dL (06-25-20 @ 17:43)  POCT Blood Glucose.: 199 mg/dL (06-25-20 @ 11:49)                            9.5    10.88 )-----------( 316      ( 27 Jun 2020 23:46 )             31.4       06-27    145  |  109<H>  |  28<H>  ----------------------------<  96  4.2   |  25  |  0.71    Ca    9.3      27 Jun 2020 23:46  Phos  4.5     06-27  Mg     2.3     06-27        Thyroid Function Tests:  06-11 @ 13:58 TSH 8.26 FreeT4 -- T3 55 Anti TPO -- Anti Thyroglobulin Ab -- TSI --  06-04 @ 01:58 TSH -- FreeT4 2.7 T3 -- Anti TPO -- Anti Thyroglobulin Ab -- TSI --      A1C with Estimated Average Glucose Result: 7.4 % (05-30-20 @ 04:43)  A1C with Estimated Average Glucose Result: 6.4 % (05-14-20 @ 16:23)  A1C with Estimated Average Glucose Result: 6.5 % (05-13-20 @ 08:24)  Hemoglobin A1C, Whole Blood: 9.0 % (01-28-20 @ 19:30)  Hemoglobin A1C, Whole Blood: 9.2 % (01-27-20 @ 17:36)          Contact number: bhavna [phone] or [phone]

## 2020-06-28 NOTE — PROGRESS NOTE ADULT - PROBLEM SELECTOR PLAN 2
-Levothyroxine 150mcg per PEG daily  -Hold TF 1 hour prior and after levothyroxine given. Do not give with other medications..    discussed w/RN  pager: [phone]   cell: [phone]  office: [phone]    -I spent a total time of 26 mins with the patient at bedside/on unit of which more than 50% of time was spent on counseling/coordination of care.

## 2020-06-28 NOTE — PROGRESS NOTE ADULT - PROBLEM SELECTOR PLAN 1
-test BG Q6h  -Adjust NPH as follows  6am: increase NPH 48 units  12pm: Decrease NPH 30 units   6pm/12am: Decrease NPH 8 units  hold NPH if Tube feeds off. Can give 50% of dose if BG less than 100mg/dl)  -Please notify endocrine if any changes in steroid regimen or TF regimen so we can adjust insulin regimen accordingly.

## 2020-06-28 NOTE — PROGRESS NOTE ADULT - ASSESSMENT
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    Trach and PEG being discussed with family. no decision yet   Activity: [] OOB as tolerated [] Bedrest [X] PT [X] OT [] PMNR      PULM:  Intubated since 5/29- Day 30 of intubation.- awaiting family decision on goals of care - not likely to withstand extubation.  H/O of COPD   CPAP as tolerated.   SATS >96 %         CV: Intermittently tachy   Keep -160  Anasarca- [id number]  HTN: Continue norvasc   sinus tachycardia, metoprolol to 25 q6h  Statin for HLD        RENAL:  IVL. Monitor Is/Os  iNCONT  on cardura for urinary retention      GI:  Diet: On Glucerna OGT   Continue Banana flakes and metamucil , LBM 6/27  GI prophylaxis [] not indicated [X] PO PPI [] other:      ENDO: S/P hyperglycemia - IR DM  Endo Following for adjustment of NPH; will follow up on sugar levels  Continue PO synthroid  Continue prednisone 15mg, 5 mg at bedtime (was on it at home for RA). Likely etiology of bruising  Goal euglycemia (-180)      HEME/ONC:   On SQL for DVT ppx- 30mg SQ q12.  VTE prophylaxis: [X] SCDs [X] chemoprophylaxis [X] high risk of DVT/PE on admission due to: bed bound at baseline  LE doppler 5/30- No DVT    ID:  Afebrile   Ertapnem for ESBL found growing in urine culture on 6/4 - ended on  - 6/14    MISC: Awaiting family decision regarding trach/PEG vs full code.     SOCIAL/FAMILY:  [X] updated family via phone  [] family meeting    CODE STATUS:  [X] Full Code     DISPOSITION:  [X] ICU    [X] Patient is at high risk of neurologic deterioration/death due to: resp failure, seizures, hemorrhage.

## 2020-06-29 LAB
APPEARANCE UR: ABNORMAL
BACTERIA # UR AUTO: ABNORMAL
BILIRUB UR-MCNC: NEGATIVE — SIGNIFICANT CHANGE UP
COLOR SPEC: YELLOW — SIGNIFICANT CHANGE UP
DIFF PNL FLD: NEGATIVE — SIGNIFICANT CHANGE UP
EPI CELLS # UR: 1 — SIGNIFICANT CHANGE UP
GLUCOSE BLDC GLUCOMTR-MCNC: 109 MG/DL — HIGH (ref 70–99)
GLUCOSE BLDC GLUCOMTR-MCNC: 114 MG/DL — HIGH (ref 70–99)
GLUCOSE BLDC GLUCOMTR-MCNC: 228 MG/DL — HIGH (ref 70–99)
GLUCOSE BLDC GLUCOMTR-MCNC: 259 MG/DL — HIGH (ref 70–99)
GLUCOSE UR QL: NEGATIVE — SIGNIFICANT CHANGE UP
GRAM STN FLD: SIGNIFICANT CHANGE UP
HCT VFR BLD CALC: 30.3 % — LOW (ref 34.5–45)
HGB BLD-MCNC: 9.1 G/DL — LOW (ref 11.5–15.5)
HYALINE CASTS # UR AUTO: 1 /LPF — SIGNIFICANT CHANGE UP (ref 0–7)
KETONES UR-MCNC: NEGATIVE — SIGNIFICANT CHANGE UP
LACTATE SERPL-SCNC: 2.1 MMOL/L — HIGH (ref 0.7–2)
LEUKOCYTE ESTERASE UR-ACNC: NEGATIVE — SIGNIFICANT CHANGE UP
MCHC RBC-ENTMCNC: 28.4 PG — SIGNIFICANT CHANGE UP (ref 27–34)
MCHC RBC-ENTMCNC: 30 GM/DL — LOW (ref 32–36)
MCV RBC AUTO: 94.7 FL — SIGNIFICANT CHANGE UP (ref 80–100)
NITRITE UR-MCNC: NEGATIVE — SIGNIFICANT CHANGE UP
NRBC # BLD: 0 /100 WBCS — SIGNIFICANT CHANGE UP (ref 0–0)
PH UR: 7 — SIGNIFICANT CHANGE UP (ref 5–8)
PLATELET # BLD AUTO: 306 K/UL — SIGNIFICANT CHANGE UP (ref 150–400)
PROT UR-MCNC: ABNORMAL
RBC # BLD: 3.2 M/UL — LOW (ref 3.8–5.2)
RBC # FLD: 18.6 % — HIGH (ref 10.3–14.5)
RBC CASTS # UR COMP ASSIST: 1 /HPF — SIGNIFICANT CHANGE UP (ref 0–4)
SP GR SPEC: 1.02 — SIGNIFICANT CHANGE UP (ref 1.01–1.02)
SPECIMEN SOURCE: SIGNIFICANT CHANGE UP
UROBILINOGEN FLD QL: NEGATIVE — SIGNIFICANT CHANGE UP
WBC # BLD: 9.31 K/UL — SIGNIFICANT CHANGE UP (ref 3.8–10.5)
WBC # FLD AUTO: 9.31 K/UL — SIGNIFICANT CHANGE UP (ref 3.8–10.5)
WBC UR QL: 2 /HPF — SIGNIFICANT CHANGE UP (ref 0–5)

## 2020-06-29 PROCEDURE — 71045 X-RAY EXAM CHEST 1 VIEW: CPT | Mod: 26

## 2020-06-29 PROCEDURE — 99292 CRITICAL CARE ADDL 30 MIN: CPT

## 2020-06-29 PROCEDURE — 99232 SBSQ HOSP IP/OBS MODERATE 35: CPT

## 2020-06-29 PROCEDURE — 99291 CRITICAL CARE FIRST HOUR: CPT

## 2020-06-29 RX ORDER — HUMAN INSULIN 100 [IU]/ML
10 INJECTION, SUSPENSION SUBCUTANEOUS
Refills: 0 | Status: DISCONTINUED | OUTPATIENT
Start: 2020-06-30 | End: 2020-07-01

## 2020-06-29 RX ORDER — HUMAN INSULIN 100 [IU]/ML
5 INJECTION, SUSPENSION SUBCUTANEOUS ONCE
Refills: 0 | Status: COMPLETED | OUTPATIENT
Start: 2020-06-29 | End: 2020-06-29

## 2020-06-29 RX ORDER — HUMAN INSULIN 100 [IU]/ML
50 INJECTION, SUSPENSION SUBCUTANEOUS
Refills: 0 | Status: DISCONTINUED | OUTPATIENT
Start: 2020-06-30 | End: 2020-07-09

## 2020-06-29 RX ORDER — PANTOPRAZOLE SODIUM 20 MG/1
40 TABLET, DELAYED RELEASE ORAL DAILY
Refills: 0 | Status: DISCONTINUED | OUTPATIENT
Start: 2020-06-29 | End: 2020-07-05

## 2020-06-29 RX ORDER — HUMAN INSULIN 100 [IU]/ML
6 INJECTION, SUSPENSION SUBCUTANEOUS
Refills: 0 | Status: DISCONTINUED | OUTPATIENT
Start: 2020-06-29 | End: 2020-07-08

## 2020-06-29 RX ADMIN — HUMAN INSULIN 30 UNIT(S): 100 INJECTION, SUSPENSION SUBCUTANEOUS at 12:02

## 2020-06-29 RX ADMIN — Medication 150 MICROGRAM(S): at 05:43

## 2020-06-29 RX ADMIN — ENOXAPARIN SODIUM 30 MILLIGRAM(S): 100 INJECTION SUBCUTANEOUS at 05:39

## 2020-06-29 RX ADMIN — PANTOPRAZOLE SODIUM 40 MILLIGRAM(S): 20 TABLET, DELAYED RELEASE ORAL at 12:01

## 2020-06-29 RX ADMIN — Medication 25 MILLIGRAM(S): at 23:39

## 2020-06-29 RX ADMIN — ROBINUL 0.1 MILLIGRAM(S): 0.2 INJECTION INTRAMUSCULAR; INTRAVENOUS at 14:36

## 2020-06-29 RX ADMIN — Medication 25 MILLIGRAM(S): at 17:21

## 2020-06-29 RX ADMIN — Medication 6: at 05:41

## 2020-06-29 RX ADMIN — HUMAN INSULIN 6 UNIT(S): 100 INJECTION, SUSPENSION SUBCUTANEOUS at 17:22

## 2020-06-29 RX ADMIN — CHLORHEXIDINE GLUCONATE 1 APPLICATION(S): 213 SOLUTION TOPICAL at 23:38

## 2020-06-29 RX ADMIN — LEVETIRACETAM 1000 MILLIGRAM(S): 250 TABLET, FILM COATED ORAL at 17:21

## 2020-06-29 RX ADMIN — Medication 25 MILLIGRAM(S): at 12:01

## 2020-06-29 RX ADMIN — SIMVASTATIN 20 MILLIGRAM(S): 20 TABLET, FILM COATED ORAL at 23:40

## 2020-06-29 RX ADMIN — Medication 15 MILLIGRAM(S): at 05:44

## 2020-06-29 RX ADMIN — Medication 4: at 12:02

## 2020-06-29 RX ADMIN — Medication 2 MILLIGRAM(S): at 23:47

## 2020-06-29 RX ADMIN — Medication 2 MILLIGRAM(S): at 23:39

## 2020-06-29 RX ADMIN — CHLORHEXIDINE GLUCONATE 15 MILLILITER(S): 213 SOLUTION TOPICAL at 05:38

## 2020-06-29 RX ADMIN — Medication 650 MILLIGRAM(S): at 17:22

## 2020-06-29 RX ADMIN — AMLODIPINE BESYLATE 10 MILLIGRAM(S): 2.5 TABLET ORAL at 05:38

## 2020-06-29 RX ADMIN — ENOXAPARIN SODIUM 30 MILLIGRAM(S): 100 INJECTION SUBCUTANEOUS at 17:21

## 2020-06-29 RX ADMIN — Medication 5 MILLIGRAM(S): at 23:39

## 2020-06-29 RX ADMIN — Medication 25 MILLIGRAM(S): at 05:44

## 2020-06-29 RX ADMIN — LACOSAMIDE 200 MILLIGRAM(S): 50 TABLET ORAL at 17:21

## 2020-06-29 RX ADMIN — CHLORHEXIDINE GLUCONATE 15 MILLILITER(S): 213 SOLUTION TOPICAL at 17:21

## 2020-06-29 RX ADMIN — HUMAN INSULIN 5 UNIT(S): 100 INJECTION, SUSPENSION SUBCUTANEOUS at 23:47

## 2020-06-29 RX ADMIN — HUMAN INSULIN 48 UNIT(S): 100 INJECTION, SUSPENSION SUBCUTANEOUS at 05:42

## 2020-06-29 RX ADMIN — LACOSAMIDE 200 MILLIGRAM(S): 50 TABLET ORAL at 05:43

## 2020-06-29 RX ADMIN — LEVETIRACETAM 1000 MILLIGRAM(S): 250 TABLET, FILM COATED ORAL at 05:43

## 2020-06-29 NOTE — PROGRESS NOTE ADULT - SUBJECTIVE AND OBJECTIVE BOX
SUMMARY: 65 yo F with past medical hx of hypothyroidism, HTN, IDDM, COPD/CHRIS on home O2, RA (on Prednisone), SBO s/p ex lap with lysis of adhesions c/b evisceration,  recent hospitalization in 3/2020 with encephalopathy, significantly hypothyroid (, without myxedema coma), small (stable) right sided SDH, UTI, catatonic reaction to VPA (now resolved) and DKA and psychiatric issues, 5/2020 hospitalization for GIB, Emphysematous cystitis, endometritis, Sigmoid colitis, morbid obesity, functional quadriplegia presented on 5/30 with AMS and was found to have L frontal IPH. Per report, patient seized at home prior to presentation, but has not noted to be seizing during the hospitalization. Continued Goals of care discussion with family; palliative and ethics involved    HCP: Norris Rush, Son [phone]; Daughter, Heavenly Schmidt [phone]; son asks that only him or his sister, Heavenly be contacted for patient updates/information as they are both proxy's for patient (29 May 2020 23:41)    ADMISSION SCORES:  GCS: 5 [E2 VNT M3]  ICH: 2    OVERNIGHT/ Early am events-  None      ICU Vital Signs Last 24 Hrs  T(C): 37 (29 Jun 2020 04:00), Max: 37.4 (28 Jun 2020 08:00)  T(F): 98.6 (29 Jun 2020 04:00), Max: 99.3 (28 Jun 2020 08:00)  HR: 89 (29 Jun 2020 06:00) (87 - 108)  BP: 133/58 (29 Jun 2020 05:00) (89/54 - 142/88)  BP(mean): 84 (29 Jun 2020 05:00) (66 - 108)  ABP: --  ABP(mean): --  RR: 14 (29 Jun 2020 06:00) (14 - 57)  SpO2: 100% (29 Jun 2020 06:00) (92% - 100%)    MEDICATIONS  (STANDING):  amLODIPine   Tablet 10 milliGRAM(s) Oral daily  chlorhexidine 0.12% Liquid 15 milliLiter(s) Oral Mucosa every 12 hours  chlorhexidine 4% Liquid 1 Application(s) Topical <User Schedule>  dextrose 5%. 1000 milliLiter(s) (50 mL/Hr) IV Continuous <Continuous>  dextrose 50% Injectable 25 Gram(s) IV Push once  doxazosin 2 milliGRAM(s) Oral at bedtime  enoxaparin Injectable 30 milliGRAM(s) SubCutaneous two times a day  insulin lispro (HumaLOG) corrective regimen sliding scale   SubCutaneous every 6 hours  insulin NPH human recombinant 30 Unit(s) SubCutaneous <User Schedule>  insulin NPH human recombinant 48 Unit(s) SubCutaneous <User Schedule>  insulin NPH human recombinant 8 Unit(s) SubCutaneous <User Schedule>  lacosamide Solution 200 milliGRAM(s) Oral two times a day  levETIRAcetam  Solution 1000 milliGRAM(s) Oral two times a day  levothyroxine 150 MICROGram(s) Oral daily  metoprolol tartrate 25 milliGRAM(s) Oral every 6 hours  pantoprazole   Suspension 40 milliGRAM(s) Oral daily  predniSONE   Tablet 15 milliGRAM(s) Oral <User Schedule>  predniSONE   Tablet 5 milliGRAM(s) Oral at bedtime  simvastatin 20 milliGRAM(s) Oral at bedtime    MEDICATIONS  (PRN):  acetaminophen    Suspension .. 650 milliGRAM(s) Oral every 6 hours PRN Temp greater or equal to 38C (100.4F), Mild Pain (1 - 3)  glycopyrrolate Injectable 0.1 milliGRAM(s) IV Push three times a day PRN secretions  sodium chloride 0.9% lock flush 10 milliLiter(s) IV Push every 1 hour PRN Pre/post blood products, medications, blood draw, and to maintain line patency  traMADol 50 milliGRAM(s) Oral every 4 hours PRN tachycardia, vent dyssynchrony    DIET: Glucerna @65     LABS:           No recent labs- last performed 6/22.              PHYSICAL EXAM:  Neurological:  intubated, EO spont, does not track, disconjugated gaze, pupils 3mm reactive, +cough/gag, not follow commands, upper extremities tremors when stimulated, LE trace movement  to noxious   Cardiovascular: +s1, s2, borderline tachycardic.   Pulmonary: No respiratory distress  Gastrointestinal: soft, non-distended, non-tender  abd: soft, non-tender, nl bs ,+ skin lesion  Extremities: Warm, dry  + skin tear on buttocks SUMMARY: 65 yo F with past medical hx of hypothyroidism, HTN, IDDM, COPD/CHRIS on home O2, RA (on Prednisone), SBO s/p ex lap with lysis of adhesions c/b evisceration,  recent hospitalization in 3/2020 with encephalopathy, significantly hypothyroid (, without myxedema coma), small (stable) right sided SDH, UTI, catatonic reaction to VPA (now resolved) and DKA and psychiatric issues, 5/2020 hospitalization for GIB, Emphysematous cystitis, endometritis, Sigmoid colitis, morbid obesity, functional quadriplegia presented on 5/30 with AMS and was found to have L frontal IPH. Per report, patient seized at home prior to presentation, but has not noted to be seizing during the hospitalization. Continued Goals of care discussion with family; palliative and ethics involved    HCP: Norris Rush, Son [phone]; Daughter, Heavenly Schmidt [phone]; son asks that only him or his sister, Heavenly be contacted for patient updates/information as they are both proxy's for patient (29 May 2020 23:41)    ADMISSION SCORES:  GCS: 5 [E2 VNT M3]  ICH: 2    OVERNIGHT/ Early am events-  None      ICU Vital Signs Last 24 Hrs  T(C): 37 (29 Jun 2020 04:00), Max: 37.4 (28 Jun 2020 08:00)  T(F): 98.6 (29 Jun 2020 04:00), Max: 99.3 (28 Jun 2020 08:00)  HR: 89 (29 Jun 2020 06:00) (87 - 108)  BP: 133/58 (29 Jun 2020 05:00) (89/54 - 142/88)  BP(mean): 84 (29 Jun 2020 05:00) (66 - 108)  ABP: --  ABP(mean): --  RR: 14 (29 Jun 2020 06:00) (14 - 57)  SpO2: 100% (29 Jun 2020 06:00) (92% - 100%)    MEDICATIONS  (STANDING):  amLODIPine   Tablet 10 milliGRAM(s) Oral daily  chlorhexidine 0.12% Liquid 15 milliLiter(s) Oral Mucosa every 12 hours  chlorhexidine 4% Liquid 1 Application(s) Topical <User Schedule>  dextrose 5%. 1000 milliLiter(s) (50 mL/Hr) IV Continuous <Continuous>  dextrose 50% Injectable 25 Gram(s) IV Push once  doxazosin 2 milliGRAM(s) Oral at bedtime  enoxaparin Injectable 30 milliGRAM(s) SubCutaneous two times a day  insulin lispro (HumaLOG) corrective regimen sliding scale   SubCutaneous every 6 hours  insulin NPH human recombinant 30 Unit(s) SubCutaneous <User Schedule>  insulin NPH human recombinant 48 Unit(s) SubCutaneous <User Schedule>  insulin NPH human recombinant 8 Unit(s) SubCutaneous <User Schedule>  lacosamide Solution 200 milliGRAM(s) Oral two times a day  levETIRAcetam  Solution 1000 milliGRAM(s) Oral two times a day  levothyroxine 150 MICROGram(s) Oral daily  metoprolol tartrate 25 milliGRAM(s) Oral every 6 hours  pantoprazole   Suspension 40 milliGRAM(s) Oral daily  predniSONE   Tablet 15 milliGRAM(s) Oral <User Schedule>  predniSONE   Tablet 5 milliGRAM(s) Oral at bedtime  simvastatin 20 milliGRAM(s) Oral at bedtime    MEDICATIONS  (PRN):  acetaminophen    Suspension .. 650 milliGRAM(s) Oral every 6 hours PRN Temp greater or equal to 38C (100.4F), Mild Pain (1 - 3)  glycopyrrolate Injectable 0.1 milliGRAM(s) IV Push three times a day PRN secretions  sodium chloride 0.9% lock flush 10 milliLiter(s) IV Push every 1 hour PRN Pre/post blood products, medications, blood draw, and to maintain line patency  traMADol 50 milliGRAM(s) Oral every 4 hours PRN tachycardia, vent dyssynchrony    DIET: Glucerna @65     LABS:           No recent labs- last performed 6/22.              PHYSICAL EXAM:  Neurological:  intubated, EO spont, does not track, disconjugate gaze, pupils 3mm reactive, +cough/gag, not follow commands, upper extremities tremors when stimulated, LE trace movement  to noxious stim.  copious oral secretions.  Cardiovascular: +s1, s2, borderline tachycardic.   Pulmonary: No respiratory distress  Gastrointestinal: soft, non-distended, non-tender  abd: soft, non-tender, nl bs ,+ skin lesion  Extremities: Warm, dry  + skin tear on buttocks

## 2020-06-29 NOTE — PROGRESS NOTE ADULT - PROBLEM SELECTOR PLAN 1
-test BG Q6h  -Adjust NPH as follows:   6am: increase NPH 50 units sq   12pm: Continue NPH 30 units sq  6pm: decrase to 6 units sq   12am: increase to NPH 10 units sq  hold NPH if Tube feeds off. Can give 50% of dose if BG less than 100mg/dl)  -Please notify endocrine if any changes in steroid regimen or TF regimen so we can adjust insulin regimen accordingly.

## 2020-06-29 NOTE — PROGRESS NOTE ADULT - ASSESSMENT
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    Trach and PEG being discussed with family. no decision yet   Activity: [] OOB as tolerated [] Bedrest [X] PT [X] OT [] PMNR      PULM:  Intubated since 5/29- Day 30 of intubation.- awaiting family decision on goals of care - not likely to withstand extubation.  H/O of COPD   CPAP as tolerated.   SATS >96 %         CV: Intermittently tachy   Keep -160  Anasarca- [id number]  HTN: Continue norvasc   sinus tachycardia, metoprolol to 25 q6h  Statin for HLD        RENAL:  IVL. Monitor Is/Os  iNCONT  on cardura for urinary retention      GI:  Diet: On Glucerna OGT   Continue Banana flakes and metamucil , LBM 6/27  GI prophylaxis [] not indicated [X] PO PPI [] other:      ENDO: S/P hyperglycemia - IR DM  Endo Following for adjustment of NPH; will follow up on sugar levels  Continue PO synthroid  Continue prednisone 15mg, 5 mg at bedtime (was on it at home for RA). Likely etiology of bruising  Goal euglycemia (-180)      HEME/ONC:   On SQL for DVT ppx- 30mg SQ q12.  VTE prophylaxis: [X] SCDs [X] chemoprophylaxis [X] high risk of DVT/PE on admission due to: bed bound at baseline  LE doppler 5/30- No DVT    ID:  Afebrile   Ertapnem for ESBL found growing in urine culture on 6/4 - ended on  - 6/14    MISC: Awaiting family decision regarding trach/PEG vs full code.     SOCIAL/FAMILY:  [X] updated family via phone  [] family meeting    CODE STATUS:  [X] Full Code     DISPOSITION:  [X] ICU    [X] Patient is at high risk of neurologic deterioration/death due to: resp failure, seizures, hemorrhage. 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    Trach and PEG being discussed with family. no decision yet   Activity: [] OOB as tolerated [] Bedrest [X] PT [X] OT [] PMNR      PULM:  Intubated since 5/29- Day 30 of intubation.- awaiting family decision on goals of care - not likely to withstand extubation.  H/O of COPD   CPAP as tolerated.   SATS >96 %         CV: Intermittently tachy   Keep -160  Anasarca- stable, has received lasix in past  HTN: Continue norvasc   sinus tachycardia, metoprolol to 25 q6h  Statin for HLD        RENAL:  IVL. Monitor Is/Os  iNCONT  on cardura for urinary retention      GI:  Diet: On Glucerna OGT   Continue Banana flakes and metamucil , LBM 6/28  GI prophylaxis [] not indicated [X] PO PPI [] other: protonix switched from liquid that must be mixed with apple juice to iv to avoid additional glucose intake      ENDO: S/P hyperglycemia - IR DM  Endo Following for adjustment of NPH; will follow up on sugar levels  Continue PO synthroid  Continue prednisone 15mg, 5 mg at bedtime (was on it at home for RA). Likely etiology of bruising  Goal euglycemia (-180)      HEME/ONC:   On SQL for DVT ppx- 30mg SQ q12.  VTE prophylaxis: [X] SCDs [X] chemoprophylaxis [X] high risk of DVT/PE on admission due to: bed bound at baseline  LE doppler 5/30- No DVT    ID:  Afebrile   Ertapnem for ESBL found growing in urine culture on 6/4 - ended on  - 6/14    MISC: Awaiting family decision regarding trach/PEG vs full code, vs palliation     SOCIAL/FAMILY:  [] updated family via phone  [] family meeting    CODE STATUS:  [X] Full Code     DISPOSITION:  [X] ICU    [X] Patient is at high risk of neurologic deterioration/death due to: resp failure, seizures, hemorrhage. 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    Trach and PEG being discussed with family. no decision yet   Activity: [] OOB as tolerated [] Bedrest [X] PT [X] OT [] PMNR      PULM:  Intubated since 5/29-  awaiting family decision on goals of care - not likely to withstand extubation.  H/O of COPD   CPAP as tolerated.   SATS >96 %         CV: Intermittently tachy   Keep -160  Anasarca- stable, has received lasix in past  HTN: Continue norvasc   sinus tachycardia, metoprolol to 25 q6h  Statin for HLD        RENAL:  IVL. Monitor Is/Os  iNCONT  on cardura for urinary retention      GI:  Diet: On Glucerna OGT   Continue Banana flakes and metamucil , LBM 6/28  GI prophylaxis [] not indicated [X] PO PPI [] other: protonix switched from liquid that must be mixed with apple juice to iv to avoid additional glucose intake      ENDO: S/P hyperglycemia - IR DM  Endo Following for adjustment of NPH; will follow up on sugar levels  Continue PO synthroid  Continue prednisone 15mg, 5 mg at bedtime (was on it at home for RA). Likely etiology of bruising  Goal euglycemia (-180)      HEME/ONC:   On SQL for DVT ppx- 30mg SQ q12.  VTE prophylaxis: [X] SCDs [X] chemoprophylaxis [X] high risk of DVT/PE on admission due to: bed bound at baseline  LE doppler 5/30- No DVT    ID:  Afebrile   Ertapnem for ESBL found growing in urine culture on 6/4 - ended on  - 6/14    MISC: Awaiting family decision regarding trach/PEG vs full code, vs palliation.  extensive team discussion regarding treatment plan of patient today.  will offer family every other day medical updates.  RIJ TLC removed today 6/29.  extended dwell catheter PIV placed R forearm.    SOCIAL/FAMILY:  [] updated family via phone  [] family meeting    CODE STATUS:  [X] Full Code     DISPOSITION:  [X] ICU    [X] Patient is at high risk of neurologic deterioration/death due to: resp failure, seizures, hemorrhage.

## 2020-06-29 NOTE — PROGRESS NOTE ADULT - PROBLEM SELECTOR PLAN 2
-Levothyroxine 150mcg per PEG daily  -Hold TF 1 hour prior and after levothyroxine given. Do not give with other medications.  -discussed with primary team  Jia Jules MD  Pager: [phone], between 9am-6pm  Nights and Weekends: [phone]

## 2020-06-29 NOTE — PROGRESS NOTE ADULT - SUBJECTIVE AND OBJECTIVE BOX
Chief Complaint/Follow-up on: T2D    Subjective: Primary team still awaiting a [facility] discussion with family as she has been intubated for about 30 days.     MEDICATIONS  (STANDING):  amLODIPine   Tablet 10 milliGRAM(s) Oral daily  chlorhexidine 0.12% Liquid 15 milliLiter(s) Oral Mucosa every 12 hours  chlorhexidine 4% Liquid 1 Application(s) Topical <User Schedule>  dextrose 5%. 1000 milliLiter(s) (50 mL/Hr) IV Continuous <Continuous>  dextrose 50% Injectable 25 Gram(s) IV Push once  doxazosin 2 milliGRAM(s) Oral at bedtime  enoxaparin Injectable 30 milliGRAM(s) SubCutaneous two times a day  insulin lispro (HumaLOG) corrective regimen sliding scale   SubCutaneous every 6 hours  insulin NPH human recombinant 6 Unit(s) SubCutaneous <User Schedule>  insulin NPH human recombinant 30 Unit(s) SubCutaneous <User Schedule>  lacosamide Solution 200 milliGRAM(s) Oral two times a day  levETIRAcetam  Solution 1000 milliGRAM(s) Oral two times a day  levothyroxine 150 MICROGram(s) Oral daily  metoprolol tartrate 25 milliGRAM(s) Oral every 6 hours  pantoprazole  Injectable 40 milliGRAM(s) IV Push daily  predniSONE   Tablet 15 milliGRAM(s) Oral <User Schedule>  predniSONE   Tablet 5 milliGRAM(s) Oral at bedtime  simvastatin 20 milliGRAM(s) Oral at bedtime    MEDICATIONS  (PRN):  acetaminophen    Suspension .. 650 milliGRAM(s) Oral every 6 hours PRN Temp greater or equal to 38C (100.4F), Mild Pain (1 - 3)  glycopyrrolate Injectable 0.1 milliGRAM(s) IV Push three times a day PRN secretions  sodium chloride 0.9% lock flush 10 milliLiter(s) IV Push every 1 hour PRN Pre/post blood products, medications, blood draw, and to maintain line patency  traMADol 50 milliGRAM(s) Oral every 4 hours PRN tachycardia, vent dyssynchrony      PHYSICAL EXAM:  VITALS: T(C): 37.7 (06-29-20 @ 12:00)  T(F): 99.9 (06-29-20 @ 12:00), Max: 99.9 (06-29-20 @ 12:00)  HR: 101 (06-29-20 @ 14:00) (87 - 105)  BP: 95/68 (06-29-20 @ 14:00) (91/35 - 142/88)  RR:  (14 - 57)  SpO2:  (96% - 100%)  Wt(kg): --  GENERAL: intubated and sedated  EYES: Closed, no lid lesions  RESPIRATORY: Clear to auscultation bilaterally; No rales, rhonchi, wheezing, or rubs  CARDIOVASCULAR: Regular rate and rhythm; No murmurs  GI: Soft, nontender, non distended, normal bowel sounds      POCT Blood Glucose.: 228 mg/dL (06-29-20 @ 11:55)  POCT Blood Glucose.: 259 mg/dL (06-29-20 @ 05:34)  POCT Blood Glucose.: 132 mg/dL (06-28-20 @ 22:54)  POCT Blood Glucose.: 188 mg/dL (06-28-20 @ 17:09)  POCT Blood Glucose.: 222 mg/dL (06-28-20 @ 11:39)  POCT Blood Glucose.: 215 mg/dL (06-28-20 @ 05:24)  POCT Blood Glucose.: 95 mg/dL (06-27-20 @ 23:32)  POCT Blood Glucose.: 128 mg/dL (06-27-20 @ 16:54)  POCT Blood Glucose.: 214 mg/dL (06-27-20 @ 11:38)  POCT Blood Glucose.: 178 mg/dL (06-27-20 @ 05:22)  POCT Blood Glucose.: 95 mg/dL (06-26-20 @ 23:22)  POCT Blood Glucose.: 127 mg/dL (06-26-20 @ 17:32)    06-27    145  |  109<H>  |  28<H>  ----------------------------<  96  4.2   |  25  |  0.71    EGFR if : 106  EGFR if non : 91    Ca    9.3      06-27  Mg     2.3     06-27  Phos  4.5     06-27            Thyroid Function Tests:  06-11 @ 13:58 TSH 8.26 T3 55  06-04 @ 01:58 FreeT4 2.7

## 2020-06-30 LAB
GLUCOSE BLDC GLUCOMTR-MCNC: 124 MG/DL — HIGH (ref 70–99)
GLUCOSE BLDC GLUCOMTR-MCNC: 148 MG/DL — HIGH (ref 70–99)
GLUCOSE BLDC GLUCOMTR-MCNC: 229 MG/DL — HIGH (ref 70–99)
GLUCOSE BLDC GLUCOMTR-MCNC: 272 MG/DL — HIGH (ref 70–99)

## 2020-06-30 PROCEDURE — 99231 SBSQ HOSP IP/OBS SF/LOW 25: CPT

## 2020-06-30 PROCEDURE — 95718 EEG PHYS/QHP 2-12 HR W/VEEG: CPT

## 2020-06-30 PROCEDURE — 99291 CRITICAL CARE FIRST HOUR: CPT

## 2020-06-30 PROCEDURE — 70450 CT HEAD/BRAIN W/O DYE: CPT | Mod: 26

## 2020-06-30 RX ORDER — HUMAN INSULIN 100 [IU]/ML
28 INJECTION, SUSPENSION SUBCUTANEOUS
Refills: 0 | Status: DISCONTINUED | OUTPATIENT
Start: 2020-06-30 | End: 2020-07-02

## 2020-06-30 RX ORDER — FOSPHENYTOIN 50 MG/ML
1000 INJECTION INTRAMUSCULAR; INTRAVENOUS ONCE
Refills: 0 | Status: COMPLETED | OUTPATIENT
Start: 2020-06-30 | End: 2020-06-30

## 2020-06-30 RX ORDER — FOSPHENYTOIN 50 MG/ML
200 INJECTION INTRAMUSCULAR; INTRAVENOUS EVERY 12 HOURS
Refills: 0 | Status: DISCONTINUED | OUTPATIENT
Start: 2020-06-30 | End: 2020-07-01

## 2020-06-30 RX ADMIN — HUMAN INSULIN 10 UNIT(S): 100 INJECTION, SUSPENSION SUBCUTANEOUS at 23:12

## 2020-06-30 RX ADMIN — CHLORHEXIDINE GLUCONATE 15 MILLILITER(S): 213 SOLUTION TOPICAL at 17:12

## 2020-06-30 RX ADMIN — AMLODIPINE BESYLATE 10 MILLIGRAM(S): 2.5 TABLET ORAL at 05:17

## 2020-06-30 RX ADMIN — HUMAN INSULIN 50 UNIT(S): 100 INJECTION, SUSPENSION SUBCUTANEOUS at 05:29

## 2020-06-30 RX ADMIN — CHLORHEXIDINE GLUCONATE 1 APPLICATION(S): 213 SOLUTION TOPICAL at 23:11

## 2020-06-30 RX ADMIN — FOSPHENYTOIN 108 MILLIGRAM(S) PE: 50 INJECTION INTRAMUSCULAR; INTRAVENOUS at 17:44

## 2020-06-30 RX ADMIN — Medication 2 MILLIGRAM(S): at 23:10

## 2020-06-30 RX ADMIN — Medication 25 MILLIGRAM(S): at 20:11

## 2020-06-30 RX ADMIN — LEVETIRACETAM 1000 MILLIGRAM(S): 250 TABLET, FILM COATED ORAL at 05:17

## 2020-06-30 RX ADMIN — LACOSAMIDE 200 MILLIGRAM(S): 50 TABLET ORAL at 05:17

## 2020-06-30 RX ADMIN — HUMAN INSULIN 28 UNIT(S): 100 INJECTION, SUSPENSION SUBCUTANEOUS at 12:21

## 2020-06-30 RX ADMIN — Medication 6: at 12:21

## 2020-06-30 RX ADMIN — LACOSAMIDE 200 MILLIGRAM(S): 50 TABLET ORAL at 17:12

## 2020-06-30 RX ADMIN — Medication 15 MILLIGRAM(S): at 05:17

## 2020-06-30 RX ADMIN — PANTOPRAZOLE SODIUM 40 MILLIGRAM(S): 20 TABLET, DELAYED RELEASE ORAL at 12:20

## 2020-06-30 RX ADMIN — Medication 5 MILLIGRAM(S): at 23:11

## 2020-06-30 RX ADMIN — ENOXAPARIN SODIUM 30 MILLIGRAM(S): 100 INJECTION SUBCUTANEOUS at 17:12

## 2020-06-30 RX ADMIN — Medication 150 MICROGRAM(S): at 05:17

## 2020-06-30 RX ADMIN — HUMAN INSULIN 6 UNIT(S): 100 INJECTION, SUSPENSION SUBCUTANEOUS at 17:12

## 2020-06-30 RX ADMIN — FOSPHENYTOIN 140 MILLIGRAM(S) PE: 50 INJECTION INTRAMUSCULAR; INTRAVENOUS at 01:01

## 2020-06-30 RX ADMIN — ENOXAPARIN SODIUM 30 MILLIGRAM(S): 100 INJECTION SUBCUTANEOUS at 05:17

## 2020-06-30 RX ADMIN — Medication 4: at 05:28

## 2020-06-30 RX ADMIN — SIMVASTATIN 20 MILLIGRAM(S): 20 TABLET, FILM COATED ORAL at 23:10

## 2020-06-30 RX ADMIN — FOSPHENYTOIN 108 MILLIGRAM(S) PE: 50 INJECTION INTRAMUSCULAR; INTRAVENOUS at 05:16

## 2020-06-30 RX ADMIN — LEVETIRACETAM 1000 MILLIGRAM(S): 250 TABLET, FILM COATED ORAL at 17:12

## 2020-06-30 RX ADMIN — Medication 25 MILLIGRAM(S): at 05:17

## 2020-06-30 RX ADMIN — CHLORHEXIDINE GLUCONATE 15 MILLILITER(S): 213 SOLUTION TOPICAL at 05:17

## 2020-06-30 NOTE — CHART NOTE - NSCHARTNOTEFT_GEN_A_CORE
Addendum to chart note dated 6/29  Spoke with ethicist Mega to discuss the continued indecision from family whether to pursue trach /peg or compassionate extubation.  Patient remains stable in a vegetative state. As discussed with NSCU team, Dr Thomason, patient without signs of pneumonitis or  tracheomalacia .  Patient ventilated with ET tube since admission May 29th.  Team discussion surrounding potential for pursuit of legal guardianship as family unable to make medical decisions .   HCP samantha Burdick ana maría Harris.   As discussed with Mega no decision is a decision as long as patient remains stable without complications from prolonged intubation.  It was decided that primary team would  update family about medical course and continue to support family.  Palliative care to sign off, please reconsult with acute issues

## 2020-06-30 NOTE — PROGRESS NOTE ADULT - SUBJECTIVE AND OBJECTIVE BOX
SUMMARY: 63 yo F with past medical hx of hypothyroidism, HTN, IDDM, COPD/CHRIS on home O2, RA (on Prednisone), SBO s/p ex lap with lysis of adhesions c/b evisceration,  recent hospitalization in 3/2020 with encephalopathy, significantly hypothyroid (, without myxedema coma), small (stable) right sided SDH, UTI, catatonic reaction to VPA (now resolved) and DKA and psychiatric issues, 5/2020 hospitalization for GIB, Emphysematous cystitis, endometritis, Sigmoid colitis, morbid obesity, functional quadriplegia presented on 5/30 with AMS and was found to have L frontal IPH. Per report, patient seized at home prior to presentation, but has not noted to be seizing during the hospitalization. Continued Goals of care discussion with family; palliative and ethics involved    HCP: Norris Rush, Son [phone]; Daughter, Heavenly Schmidt [phone]; son asks that only him or his sister, Heavenly be contacted for patient updates/information as they are both proxy's for patient (29 May 2020 23:41)    ADMISSION SCORES:  GCS: 5 [E2 VNT M3]  ICH: 2    OVERNIGHT/ Early am events- ? sz activity around midnight as evidenced by upper lip twitch, non-stim induced upper extrem. shaking which was previously stim induced.      ICU Vital Signs Last 24 Hrs  T(C): 37.2 (30 Jun 2020 04:00), Max: 38.1 (29 Jun 2020 16:00)  T(F): 99 (30 Jun 2020 04:00), Max: 100.6 (29 Jun 2020 16:00)  HR: 86 (30 Jun 2020 06:20) (86 - 112)  BP: 158/72 (30 Jun 2020 04:00) (51/24 - 247/182)  BP(mean): 102 (30 Jun 2020 04:00) (31 - 210)  ABP: --  ABP(mean): --  RR: 15 (30 Jun 2020 04:00) (14 - 33)  SpO2: 100% (30 Jun 2020 06:20) (96% - 100%)  MEDICATIONS  (STANDING):  amLODIPine   Tablet 10 milliGRAM(s) Oral daily  chlorhexidine 0.12% Liquid 15 milliLiter(s) Oral Mucosa every 12 hours  chlorhexidine 4% Liquid 1 Application(s) Topical <User Schedule>  dextrose 5%. 1000 milliLiter(s) (50 mL/Hr) IV Continuous <Continuous>  dextrose 50% Injectable 25 Gram(s) IV Push once  doxazosin 2 milliGRAM(s) Oral at bedtime  enoxaparin Injectable 30 milliGRAM(s) SubCutaneous two times a day  fosphenytoin IVPB 200 milliGRAM(s) PE IV Intermittent every 12 hours  insulin lispro (HumaLOG) corrective regimen sliding scale   SubCutaneous every 6 hours  insulin NPH human recombinant 50 Unit(s) SubCutaneous <User Schedule>  insulin NPH human recombinant 6 Unit(s) SubCutaneous <User Schedule>  insulin NPH human recombinant 10 Unit(s) SubCutaneous <User Schedule>  insulin NPH human recombinant 30 Unit(s) SubCutaneous <User Schedule>  lacosamide Solution 200 milliGRAM(s) Oral two times a day  levETIRAcetam  Solution 1000 milliGRAM(s) Oral two times a day  levothyroxine 150 MICROGram(s) Oral daily  metoprolol tartrate 25 milliGRAM(s) Oral every 6 hours  pantoprazole  Injectable 40 milliGRAM(s) IV Push daily  predniSONE   Tablet 15 milliGRAM(s) Oral <User Schedule>  predniSONE   Tablet 5 milliGRAM(s) Oral at bedtime  simvastatin 20 milliGRAM(s) Oral at bedtime    MEDICATIONS  (PRN):  acetaminophen    Suspension .. 650 milliGRAM(s) Oral every 6 hours PRN Temp greater or equal to 38C (100.4F), Mild Pain (1 - 3)  glycopyrrolate Injectable 0.1 milliGRAM(s) IV Push three times a day PRN secretions  sodium chloride 0.9% lock flush 10 milliLiter(s) IV Push every 1 hour PRN Pre/post blood products, medications, blood draw, and to maintain line patency  traMADol 50 milliGRAM(s) Oral every 4 hours PRN tachycardia, vent dyssynchrony    DIET: Glucerna @65     LABS:                           9.1    9.31  )-----------( 306      ( 29 Jun 2020 16:59 )             30.3                    PHYSICAL EXAM:  Neurological:  intubated, no EO,  does not track, disconjugate gaze, pupils 3mm reactive, +cough/gag, not follow commands, upper extremities tremors when stimulated w/ trace bilateral withdrawal, LE trace withdrawal  to noxious stim.    Cardiovascular: +s1, s2, borderline tachycardic.   Pulmonary: No respiratory distress, in line suction catheter bloody secretions overnight improved now this morning  Gastrointestinal: soft, non-distended, non-tender  abd: soft, non-tender, nl bs ,+ skin lesion  Extremities: Warm, dry  + skin tear on buttocks

## 2020-06-30 NOTE — CHART NOTE - NSCHARTNOTEFT_GEN_A_CORE
Called pt's son Norris.   Shared information regarding pt's current condition.   Questions answered  Did not address goals of care

## 2020-06-30 NOTE — EEG REPORT - NS EEG TEXT BOX
Patient connected to EEG. EEG x 45 min shows moderate diffuse slowing and continuous left hemispheric slowing indicating a structural abnormality in that hemisphere. There are also frequent sharp wave discharges over the right frontotemporal region (Fp2, F4, F8, T8) and right occipitotemporal (O2, P8) region indicating a risk for seizures in those regions. No seizures seen.     Full report in AM.     Prelim fellow read    Gail Carolina MD  Epilepsy Fellow Patient connected to EEG. EEG x 45 min shows moderate diffuse slowing and continuous right greater than left hemispheric slowing. There are also frequent sharp wave discharges over the right posterior temporal (P8), and infrequently over the right frontotemporal (F4 F8) regions indicating a risk for focal seizures. No seizures seen.     Full report in AM.     Gail Carolina MD  Epilepsy Fellow    Note appended 13:34    Zhen Barry MD FAES  Director, Continuous EEG Monitoring Program, [facility]   and Epilepsy Fellowship ,   Department of Neurology, [facility] Patient connected to EEG. EEG x 45 min shows moderate diffuse slowing and continuous right greater amplitude than left hemispheric slowing. There are also frequent sharp wave discharges over the right posterior temporal (P8), and infrequently over the right frontotemporal (F4 F8) regions indicating a risk for focal seizures. No seizures seen.     Full report in AM.     Gail Carolina MD  Epilepsy Fellow    Note appended 13:34    Zhen Barry MD FAES  Director, Continuous EEG Monitoring Program, [facility]   and Epilepsy Fellowship ,   Department of Neurology, [facility]

## 2020-06-30 NOTE — PROGRESS NOTE ADULT - PROBLEM SELECTOR PLAN 2
-Levothyroxine 150mcg per PEG daily  -Hold TF 1 hour prior and after levothyroxine given. Do not give with other medications.  -discussed with RN  pager: [phone]   cell: [phone]  office: [phone]    -I spent a total time of 17 mins with the patient at bedside/on unit of which more than 50% of time was spent on counseling/coordination of care.

## 2020-06-30 NOTE — CHART NOTE - NSCHARTNOTEFT_GEN_A_CORE
Nutrition Follow Up Note   Patient seen for: nutrition follow up on  ICU     Source: medical record, communication with team.     Chart reviewed, events noted. Adm for ICH, SAH w/ IVH. Remains intubated. Endocrine following BG, adjusting NPH.    Diet Order: Diet, NPO with Tube Feed:   Tube Feeding Modality: Orogastric  Glucerna 1.2 Dewey (GLUCERNARTH)  Total Volume for 24 Hours (mL): 1560  Continuous  Starting Tube Feed Rate {mL per Hour}: 20  Increase Tube Feed Rate by (mL): 10     Every 4 hours  Until Goal Tube Feed Rate (mL per Hour): 65  Tube Feed Duration (in Hours): 24  Tube Feed Start Time: 23:00 (06-24-20 @ 21:38)    CURRENT EN ORDER PROVIDES: 1872 kcals, 94 gm protein, 1255cc free water    Nutrition Events:   -98% EN goal met over past 5 days  -has order to hold EN for synthroid rx    GI: no vomiting, no abd distention  fecal incontinence 6/29  (has rx for Banatrol 2 times/day)    Anthropometric Measurements:   Height (cm): 162.6 (05-30-20 @ 00:49), 152.4 (05-29-20 @ 19:18), 154.9 (05-12-20 @ 21:48)  Weight (kg): 99.6 (05-30-20 @ 00:49), 136.1 (05-29-20 @ 19:18), 98.2 (05-12-20 @ 23:57)  BMI (kg/m2): 37.7 (05-30-20 @ 00:49), 51.5 (05-30-20 @ 00:49), 58.6 (05-29-20 @ 19:18), 40.9 (05-12-20 @ 23:57)      Medications: MEDICATIONS  (STANDING):  amLODIPine   Tablet 10 milliGRAM(s) Oral daily  chlorhexidine 0.12% Liquid 15 milliLiter(s) Oral Mucosa every 12 hours  chlorhexidine 4% Liquid 1 Application(s) Topical <User Schedule>  dextrose 5%. 1000 milliLiter(s) (50 mL/Hr) IV Continuous <Continuous>  dextrose 50% Injectable 25 Gram(s) IV Push once  doxazosin 2 milliGRAM(s) Oral at bedtime  enoxaparin Injectable 30 milliGRAM(s) SubCutaneous two times a day  fosphenytoin IVPB 200 milliGRAM(s) PE IV Intermittent every 12 hours  insulin lispro (HumaLOG) corrective regimen sliding scale   SubCutaneous every 6 hours  insulin NPH human recombinant 50 Unit(s) SubCutaneous <User Schedule>  insulin NPH human recombinant 6 Unit(s) SubCutaneous <User Schedule>  insulin NPH human recombinant 10 Unit(s) SubCutaneous <User Schedule>  insulin NPH human recombinant 28 Unit(s) SubCutaneous <User Schedule>  lacosamide Solution 200 milliGRAM(s) Oral two times a day  levETIRAcetam  Solution 1000 milliGRAM(s) Oral two times a day  levothyroxine 150 MICROGram(s) Oral daily  metoprolol tartrate 25 milliGRAM(s) Oral every 6 hours  pantoprazole  Injectable 40 milliGRAM(s) IV Push daily  predniSONE   Tablet 15 milliGRAM(s) Oral <User Schedule>  predniSONE   Tablet 5 milliGRAM(s) Oral at bedtime  simvastatin 20 milliGRAM(s) Oral at bedtime    MEDICATIONS  (PRN):  acetaminophen    Suspension .. 650 milliGRAM(s) Oral every 6 hours PRN Temp greater or equal to 38C (100.4F), Mild Pain (1 - 3)  glycopyrrolate Injectable 0.1 milliGRAM(s) IV Push three times a day PRN secretions  sodium chloride 0.9% lock flush 10 milliLiter(s) IV Push every 1 hour PRN Pre/post blood products, medications, blood draw, and to maintain line patency  traMADol 50 milliGRAM(s) Oral every 4 hours PRN tachycardia, vent dyssynchrony    Labs: 06-29 @ 16:59: Sodium --, Potassium --, Calcium --, Magnesium --, Phosphorus --, BUN --, Creatinine --, Glucose --, Alk Phos --, ALT/SGPT --, AST/SGOT --, Albumin --, Prealbumin --, Total Bilirubin --, Hemoglobin 9.1<L>, Hematocrit 30.3<L>, Ferritin --, C-Reactive Protein --, Creatine Kinase <<27>        POCT Blood Glucose.: 229 mg/dL (06-30-20 @ 05:14)  POCT Blood Glucose.: 109 mg/dL (06-29-20 @ 23:25)  POCT Blood Glucose.: 114 mg/dL (06-29-20 @ 17:16)  POCT Blood Glucose.: 228 mg/dL (06-29-20 @ 11:55)    Skin: no pressure injuries documented  has abd and ankle wounds  Edema: 2+ face    Estimated Needs: based on upper IBW 60 kg, with consideration for intubation and BMI>30  Energy: 6093-4871 calories (30-35 dewey/kg)  Protein: 84-96 grams (1.4-1.6 gm/kg)    Previous Nutrition Diagnosis: increased needs  Nutrition Diagnosis is: ongoing, pt meeting >80% estimated nutrition needs    New Nutrition Diagnosis:  none    Recommended Interventions:   1. Continue Glucerna 1.2 @ 65 ml/hr x 24 hours to provide 1560 ml formula, 1872 calories (31 dewey/kg), 94 grams protein (1.6 gm/kg), based on upper IBW 60 kg  2. Monitor BMs and ongoing need for Banatrol      Monitoring and Evaluation:   Continue to monitor nutrition provision and tolerance, weights, labs, skin integrity.   RD remains available upon request and will follow up per protocol.

## 2020-06-30 NOTE — PROGRESS NOTE ADULT - ASSESSMENT
ASSESSMENT:  Left frontal intracerebral hemorrhage, vegetative state  Continued goals of care conversation, palliative and ethics involved      NEURO:  Neuro q4h check  seizure management: Keppra and Vimpat, loaded 1 gram phosphenytoin overnight after given ativan 2mg for shaking episode.  will get head ct to evaluate prior bleed, will get VEEG after.  Pain management with Tylenol & fentanyl prn  Given previous psych history and workup not revealing any cause, NMDA encephalitis testing sent -> NMDA AB neg.    Trach and PEG being discussed with family. no decision yet   Activity: [] OOB as tolerated [] Bedrest [X] PT [X] OT [] PMNR      PULM:  Intubated since 5/29-  awaiting family decision on goals of care - not likely to withstand extubation, unclear if family will elect to trach yet.  H/O of COPD   CPAP as tolerated.   SATS >96 %         CV: Intermittently tachy   Keep -160  Anasarca- stable, has received lasix in past  HTN: Continue norvasc   sinus tachycardia, metoprolol to 25 q6h  Statin for HLD        RENAL:  IVL. Monitor Is/Os  iNCONT  on cardura for urinary retention      GI:  Diet: On Glucerna OGT   Continue Banana flakes and metamucil , LBM 6/29  GI prophylaxis [] not indicated [X] PO PPI [] other: protonix switched from liquid that must be mixed with apple juice to iv to avoid additional glucose intake      ENDO: S/P hyperglycemia - IR DM  Endo Following for adjustment of NPH; will follow up on sugar levels  Continue PO synthroid  Continue prednisone 15mg, 5 mg at bedtime (was on it at home for RA). Likely etiology of bruising  Goal euglycemia (-180)      HEME/ONC:   On SQL for DVT ppx- 30mg SQ q12.  VTE prophylaxis: [X] SCDs [X] chemoprophylaxis [X] high risk of DVT/PE on admission due to: bed bound at baseline  LE doppler 5/30- No DVT    ID:  Afebrile   Ertapnem for ESBL found growing in urine culture on 6/4 - ended on  - 6/14    MISC: Awaiting family decision regarding trach/PEG vs full code, vs palliation.  extensive team discussion regarding treatment plan of patient today.  will offer family every other day medical updates.  RIJ TLC removed today 6/29.  extended dwell catheter PIV placed R forearm.    SOCIAL/FAMILY:  [] updated family via phone  [] family meeting    CODE STATUS:  [X] Full Code     DISPOSITION:  [X] ICU    [X] Patient is at high risk of neurologic deterioration/death due to: resp failure, seizures, hemorrhage. ASSESSMENT:  Left frontal intracerebral hemorrhage, vegetative state  Continued goals of care conversation, palliative and ethics involved      NEURO:  Neuro q4h check  last stable head ct june 2, 2020 showing large left frontal iph.  seizure management: Keppra and Vimpat, loaded 1 gram phosphenytoin overnight after given ativan 2mg for shaking episode.  will get head ct to evaluate prior bleed, will get VEEG after.  Pain management with Tylenol & fentanyl prn  Given previous psych history and workup not revealing any cause, NMDA encephalitis testing sent -> NMDA AB neg.    Trach and PEG being discussed with family. no decision yet   Activity: [] OOB as tolerated [] Bedrest [X] PT [X] OT [] PMNR      PULM:  Intubated since 5/29-  awaiting family decision on goals of care - not likely to withstand extubation, unclear if family will elect to trach yet.  H/O of COPD   CPAP as tolerated.   SATS >96 %         CV: Intermittently tachy   Keep -160  Anasarca- stable, has received lasix in past  HTN: Continue norvasc   sinus tachycardia, metoprolol to 25 q6h  Statin for HLD        RENAL:  IVL. Monitor Is/Os  iNCONT  on cardura for urinary retention      GI:  Diet: On Glucerna OGT   Continue Banana flakes and metamucil , LBM 6/29  GI prophylaxis [] not indicated [X] PO PPI [] other: protonix switched from liquid that must be mixed with apple juice to iv to avoid additional glucose intake      ENDO: S/P hyperglycemia - IR DM  Endo Following for adjustment of NPH; will follow up on sugar levels  Continue PO synthroid  Continue prednisone 15mg, 5 mg at bedtime (was on it at home for RA). Likely etiology of bruising  Goal euglycemia (-180)      HEME/ONC:   On SQL for DVT ppx- 30mg SQ q12.  VTE prophylaxis: [X] SCDs [X] chemoprophylaxis [X] high risk of DVT/PE on admission due to: bed bound at baseline  LE doppler 5/30- No DVT    ID:  Afebrile   Ertapnem for ESBL found growing in urine culture on 6/4 - ended on  - 6/14    MISC: Awaiting family decision regarding trach/PEG vs full code, vs palliation.  extensive team discussion regarding treatment plan of patient today.  will offer family every other day medical updates.  RIJ TLC removed today 6/29.  extended dwell catheter PIV placed R forearm.    SOCIAL/FAMILY:  [] updated family via phone  [] family meeting    CODE STATUS:  [X] Full Code     DISPOSITION:  [X] ICU    [X] Patient is at high risk of neurologic deterioration/death due to: resp failure, seizures, hemorrhage.

## 2020-06-30 NOTE — PROVIDER CONTACT NOTE (OTHER) - ASSESSMENT
Pt noted to have increased twitching on left side when previously only twitching with stimulation. MD Mar and KEELEY Calderón at bedside. Increased jennifer red blood from ett.

## 2020-06-30 NOTE — PROVIDER CONTACT NOTE (OTHER) - ACTION/TREATMENT ORDERED:
2 Ativan given for twitching, 200 fosphenytoin ordered q12.   Continue to monitor bloody secretions. No labs ordered.

## 2020-06-30 NOTE — PROGRESS NOTE ADULT - ASSESSMENT
63 yo F with uncontrolled T2DM with no known complications, hypothyroidism/ul HTN/ COPD and RA on chronic steroids here with mental status changes associated with shaking found to have a left frontal parenchymal hematoma with mass effect on the left lateral ventricle and intraventricular extension with blood layering in the occipital horns s/p intubation on TF, awaiting family GOC decisions. BG goal (100-200mg/dl).

## 2020-06-30 NOTE — PROGRESS NOTE ADULT - SUBJECTIVE AND OBJECTIVE BOX
Diabetes Follow up note:    Chief complaint: T2DM/Steroid induced hyperglycemia    Interval Hx: BG values improved yesterday. Pt received only 5 units of NPH at midnight, BG elevated back to 200s this AM. Seizure activity s/p CT of head this AM. Pt on continuous feeds at goal.     Review of Systems:  unresponsive. Unable to provide ROS.     MEDS:  insulin lispro (HumaLOG) corrective regimen sliding scale   SubCutaneous every 6 hours  insulin NPH human recombinant 50 Unit(s) SubCutaneous <User Schedule>  insulin NPH human recombinant 6 Unit(s) SubCutaneous <User Schedule>  insulin NPH human recombinant 10 Unit(s) SubCutaneous <User Schedule>  insulin NPH human recombinant 28 Unit(s) SubCutaneous <User Schedule>  levothyroxine 150 MICROGram(s) Oral daily  predniSONE   Tablet 15 milliGRAM(s) Oral <User Schedule>  predniSONE   Tablet 5 milliGRAM(s) Oral at bedtime  simvastatin 20 milliGRAM(s) Oral at bedtime      Allergies    Depakote (Other)  Seroquel (Other (Severe))      PE:  General: Female lying in bed. NAD.   Vital Signs Last 24 Hrs  T(C): 37.4 (30 Jun 2020 08:00), Max: 38.1 (29 Jun 2020 16:00)  T(F): 99.4 (30 Jun 2020 08:00), Max: 100.6 (29 Jun 2020 16:00)  HR: 95 (30 Jun 2020 10:00) (86 - 112)  BP: 99/54 (30 Jun 2020 10:00) (51/24 - 247/182)  BP(mean): 72 (30 Jun 2020 10:00) (31 - 210)  RR: 17 (30 Jun 2020 10:00) (5 - 33)  SpO2: 100% (30 Jun 2020 10:00) (96% - 100%)  HEENT: ETT in place. On vent.   Abd: Soft, NT,ND, Obese.   Extremities: Warm  Neuro: Unresponsive     LABS:  POCT Blood Glucose.: 229 mg/dL (06-30-20 @ 05:14)  POCT Blood Glucose.: 109 mg/dL (06-29-20 @ 23:25)  POCT Blood Glucose.: 114 mg/dL (06-29-20 @ 17:16)  POCT Blood Glucose.: 228 mg/dL (06-29-20 @ 11:55)  POCT Blood Glucose.: 259 mg/dL (06-29-20 @ 05:34)  POCT Blood Glucose.: 132 mg/dL (06-28-20 @ 22:54)  POCT Blood Glucose.: 188 mg/dL (06-28-20 @ 17:09)  POCT Blood Glucose.: 222 mg/dL (06-28-20 @ 11:39)  POCT Blood Glucose.: 215 mg/dL (06-28-20 @ 05:24)  POCT Blood Glucose.: 95 mg/dL (06-27-20 @ 23:32)  POCT Blood Glucose.: 128 mg/dL (06-27-20 @ 16:54)  POCT Blood Glucose.: 214 mg/dL (06-27-20 @ 11:38)                            9.1    9.31  )-----------( 306      ( 29 Jun 2020 16:59 )             30.3               Thyroid Function Tests:  06-11 @ 13:58 TSH 8.26 FreeT4 -- T3 55 Anti TPO -- Anti Thyroglobulin Ab -- TSI --  06-04 @ 01:58 TSH -- FreeT4 2.7 T3 -- Anti TPO -- Anti Thyroglobulin Ab -- TSI --      A1C with Estimated Average Glucose Result: 7.4 % (05-30-20 @ 04:43)  A1C with Estimated Average Glucose Result: 6.4 % (05-14-20 @ 16:23)  A1C with Estimated Average Glucose Result: 6.5 % (05-13-20 @ 08:24)  Hemoglobin A1C, Whole Blood: 9.0 % (01-28-20 @ 19:30)  Hemoglobin A1C, Whole Blood: 9.2 % (01-27-20 @ 17:36)          Contact number: bhavna [phone] or [phone]

## 2020-06-30 NOTE — PROGRESS NOTE ADULT - PROBLEM SELECTOR PLAN 1
-test BG Q6h  --Adjust NPH as follows:   6am: increase NPH 50 units sq   12pm: Decrease NPH 28 units sq  6pm: c/w NPH 6 units sq   12am: c/w NPH 10 units sq  hold NPH if Tube feeds off. Can give 50% of dose if BG less than 100mg/dl)  -c/w Humalog moderate correction scale Q6h  -Please notify endocrine if any changes in steroid regimen or TF regimen so we can adjust insulin regimen accordingly.

## 2020-06-30 NOTE — PROVIDER CONTACT NOTE (OTHER) - BACKGROUND
63 y/o female history of L frontal IPH, SBO with exlap, frequent hospitalizations, intubated since 5/29. Unable to wean, no mental status.

## 2020-07-01 LAB
ALBUMIN SERPL ELPH-MCNC: 3.5 G/DL — SIGNIFICANT CHANGE UP (ref 3.3–5)
ALP SERPL-CCNC: 140 U/L — HIGH (ref 40–120)
ALT FLD-CCNC: 24 U/L — SIGNIFICANT CHANGE UP (ref 10–45)
ANION GAP SERPL CALC-SCNC: 16 MMOL/L — SIGNIFICANT CHANGE UP (ref 5–17)
AST SERPL-CCNC: 36 U/L — SIGNIFICANT CHANGE UP (ref 10–40)
BILIRUB SERPL-MCNC: <0.1 MG/DL — LOW (ref 0.2–1.2)
BUN SERPL-MCNC: 26 MG/DL — HIGH (ref 7–23)
CALCIUM SERPL-MCNC: 9.4 MG/DL — SIGNIFICANT CHANGE UP (ref 8.4–10.5)
CHLORIDE SERPL-SCNC: 105 MMOL/L — SIGNIFICANT CHANGE UP (ref 96–108)
CO2 SERPL-SCNC: 22 MMOL/L — SIGNIFICANT CHANGE UP (ref 22–31)
CREAT SERPL-MCNC: 0.65 MG/DL — SIGNIFICANT CHANGE UP (ref 0.5–1.3)
CULTURE RESULTS: SIGNIFICANT CHANGE UP
GLUCOSE BLDC GLUCOMTR-MCNC: 166 MG/DL — HIGH (ref 70–99)
GLUCOSE BLDC GLUCOMTR-MCNC: 206 MG/DL — HIGH (ref 70–99)
GLUCOSE BLDC GLUCOMTR-MCNC: 71 MG/DL — SIGNIFICANT CHANGE UP (ref 70–99)
GLUCOSE BLDC GLUCOMTR-MCNC: 78 MG/DL — SIGNIFICANT CHANGE UP (ref 70–99)
GLUCOSE SERPL-MCNC: 200 MG/DL — HIGH (ref 70–99)
HCT VFR BLD CALC: 29 % — LOW (ref 34.5–45)
HGB BLD-MCNC: 8.8 G/DL — LOW (ref 11.5–15.5)
MAGNESIUM SERPL-MCNC: 2.1 MG/DL — SIGNIFICANT CHANGE UP (ref 1.6–2.6)
MCHC RBC-ENTMCNC: 28.9 PG — SIGNIFICANT CHANGE UP (ref 27–34)
MCHC RBC-ENTMCNC: 30.3 GM/DL — LOW (ref 32–36)
MCV RBC AUTO: 95.1 FL — SIGNIFICANT CHANGE UP (ref 80–100)
NRBC # BLD: 0 /100 WBCS — SIGNIFICANT CHANGE UP (ref 0–0)
PHENYTOIN FREE SERPL-MCNC: 7.4 UG/ML — LOW (ref 10–20)
PHOSPHATE SERPL-MCNC: 3.5 MG/DL — SIGNIFICANT CHANGE UP (ref 2.5–4.5)
PLATELET # BLD AUTO: 300 K/UL — SIGNIFICANT CHANGE UP (ref 150–400)
POTASSIUM SERPL-MCNC: 4.5 MMOL/L — SIGNIFICANT CHANGE UP (ref 3.5–5.3)
POTASSIUM SERPL-SCNC: 4.5 MMOL/L — SIGNIFICANT CHANGE UP (ref 3.5–5.3)
PROT SERPL-MCNC: 6.5 G/DL — SIGNIFICANT CHANGE UP (ref 6–8.3)
RBC # BLD: 3.05 M/UL — LOW (ref 3.8–5.2)
RBC # FLD: 18.4 % — HIGH (ref 10.3–14.5)
SODIUM SERPL-SCNC: 143 MMOL/L — SIGNIFICANT CHANGE UP (ref 135–145)
SPECIMEN SOURCE: SIGNIFICANT CHANGE UP
WBC # BLD: 12.7 K/UL — HIGH (ref 3.8–10.5)
WBC # FLD AUTO: 12.7 K/UL — HIGH (ref 3.8–10.5)

## 2020-07-01 PROCEDURE — 99231 SBSQ HOSP IP/OBS SF/LOW 25: CPT

## 2020-07-01 PROCEDURE — 99291 CRITICAL CARE FIRST HOUR: CPT

## 2020-07-01 PROCEDURE — 99292 CRITICAL CARE ADDL 30 MIN: CPT

## 2020-07-01 PROCEDURE — 70552 MRI BRAIN STEM W/DYE: CPT | Mod: 26

## 2020-07-01 PROCEDURE — 71045 X-RAY EXAM CHEST 1 VIEW: CPT | Mod: 26,76

## 2020-07-01 PROCEDURE — 95720 EEG PHY/QHP EA INCR W/VEEG: CPT

## 2020-07-01 RX ORDER — GABAPENTIN 400 MG/1
300 CAPSULE ORAL THREE TIMES A DAY
Refills: 0 | Status: DISCONTINUED | OUTPATIENT
Start: 2020-07-01 | End: 2020-07-06

## 2020-07-01 RX ORDER — HUMAN INSULIN 100 [IU]/ML
12 INJECTION, SUSPENSION SUBCUTANEOUS
Refills: 0 | Status: DISCONTINUED | OUTPATIENT
Start: 2020-07-01 | End: 2020-07-11

## 2020-07-01 RX ORDER — FOSPHENYTOIN 50 MG/ML
200 INJECTION INTRAMUSCULAR; INTRAVENOUS EVERY 8 HOURS
Refills: 0 | Status: DISCONTINUED | OUTPATIENT
Start: 2020-07-01 | End: 2020-07-03

## 2020-07-01 RX ADMIN — HUMAN INSULIN 50 UNIT(S): 100 INJECTION, SUSPENSION SUBCUTANEOUS at 05:17

## 2020-07-01 RX ADMIN — Medication 150 MICROGRAM(S): at 05:16

## 2020-07-01 RX ADMIN — Medication 25 MILLIGRAM(S): at 05:17

## 2020-07-01 RX ADMIN — Medication 650 MILLIGRAM(S): at 17:38

## 2020-07-01 RX ADMIN — Medication 5 MILLIGRAM(S): at 22:49

## 2020-07-01 RX ADMIN — Medication 2 MILLIGRAM(S): at 22:49

## 2020-07-01 RX ADMIN — HUMAN INSULIN 28 UNIT(S): 100 INJECTION, SUSPENSION SUBCUTANEOUS at 11:47

## 2020-07-01 RX ADMIN — CHLORHEXIDINE GLUCONATE 15 MILLILITER(S): 213 SOLUTION TOPICAL at 17:05

## 2020-07-01 RX ADMIN — Medication 2: at 11:47

## 2020-07-01 RX ADMIN — ENOXAPARIN SODIUM 30 MILLIGRAM(S): 100 INJECTION SUBCUTANEOUS at 17:06

## 2020-07-01 RX ADMIN — LACOSAMIDE 200 MILLIGRAM(S): 50 TABLET ORAL at 05:15

## 2020-07-01 RX ADMIN — LEVETIRACETAM 1000 MILLIGRAM(S): 250 TABLET, FILM COATED ORAL at 17:05

## 2020-07-01 RX ADMIN — ROBINUL 0.1 MILLIGRAM(S): 0.2 INJECTION INTRAMUSCULAR; INTRAVENOUS at 02:04

## 2020-07-01 RX ADMIN — Medication 25 MILLIGRAM(S): at 17:05

## 2020-07-01 RX ADMIN — SIMVASTATIN 20 MILLIGRAM(S): 20 TABLET, FILM COATED ORAL at 22:49

## 2020-07-01 RX ADMIN — CHLORHEXIDINE GLUCONATE 15 MILLILITER(S): 213 SOLUTION TOPICAL at 05:16

## 2020-07-01 RX ADMIN — ROBINUL 0.1 MILLIGRAM(S): 0.2 INJECTION INTRAMUSCULAR; INTRAVENOUS at 16:27

## 2020-07-01 RX ADMIN — CHLORHEXIDINE GLUCONATE 1 APPLICATION(S): 213 SOLUTION TOPICAL at 22:51

## 2020-07-01 RX ADMIN — LEVETIRACETAM 1000 MILLIGRAM(S): 250 TABLET, FILM COATED ORAL at 05:15

## 2020-07-01 RX ADMIN — PANTOPRAZOLE SODIUM 40 MILLIGRAM(S): 20 TABLET, DELAYED RELEASE ORAL at 11:46

## 2020-07-01 RX ADMIN — Medication 4: at 05:18

## 2020-07-01 RX ADMIN — TRAMADOL HYDROCHLORIDE 50 MILLIGRAM(S): 50 TABLET ORAL at 16:28

## 2020-07-01 RX ADMIN — ENOXAPARIN SODIUM 30 MILLIGRAM(S): 100 INJECTION SUBCUTANEOUS at 05:15

## 2020-07-01 RX ADMIN — Medication 15 MILLIGRAM(S): at 05:15

## 2020-07-01 RX ADMIN — Medication 25 MILLIGRAM(S): at 11:46

## 2020-07-01 RX ADMIN — FOSPHENYTOIN 108 MILLIGRAM(S) PE: 50 INJECTION INTRAMUSCULAR; INTRAVENOUS at 05:16

## 2020-07-01 RX ADMIN — GABAPENTIN 300 MILLIGRAM(S): 400 CAPSULE ORAL at 17:06

## 2020-07-01 RX ADMIN — LACOSAMIDE 200 MILLIGRAM(S): 50 TABLET ORAL at 17:05

## 2020-07-01 RX ADMIN — FOSPHENYTOIN 108 MILLIGRAM(S) PE: 50 INJECTION INTRAMUSCULAR; INTRAVENOUS at 14:31

## 2020-07-01 RX ADMIN — FOSPHENYTOIN 108 MILLIGRAM(S) PE: 50 INJECTION INTRAMUSCULAR; INTRAVENOUS at 22:50

## 2020-07-01 RX ADMIN — AMLODIPINE BESYLATE 10 MILLIGRAM(S): 2.5 TABLET ORAL at 05:15

## 2020-07-01 NOTE — PROGRESS NOTE ADULT - SUBJECTIVE AND OBJECTIVE BOX
SUMMARY: 65 yo F with past medical hx of hypothyroidism, HTN, IDDM, COPD/CHRIS on home O2, RA (on Prednisone), SBO s/p ex lap with lysis of adhesions c/b evisceration,  recent hospitalization in 3/2020 with encephalopathy, significantly hypothyroid (, without myxedema coma), small (stable) right sided SDH, UTI, catatonic reaction to VPA (now resolved) and DKA and psychiatric issues, 5/2020 hospitalization for GIB, Emphysematous cystitis, endometritis, Sigmoid colitis, morbid obesity, functional quadriplegia presented on 5/30 with AMS and was found to have L frontal IPH. Per report, patient seized at home prior to presentation, but has not noted to be seizing during the hospitalization. Continued Goals of care discussion with family; palliative and ethics involved    HCP: Norris Rush, Son [phone]; Daughter, Heavenly Schmidt [phone]; son asks that only him or his sister, Heavenly be contacted for patient updates/information as they are both proxy's for patient (29 May 2020 23:41)    ADMISSION SCORES:  GCS: 5 [E2 VNT M3]  ICH: 2    24 HOUR EVENTS:  SZ activity around midnight as evidenced by upper lip twitch, non-stim induced upper extremity. shaking which was previously stim induced.      OVERNIGHT EVENTS:       ICU Vital Signs Last 24 Hrs  T(C): 37.9 (01 Jul 2020 12:00), Max: 37.9 (01 Jul 2020 12:00)  T(F): 100.2 (01 Jul 2020 12:00), Max: 100.2 (01 Jul 2020 12:00)  HR: 106 (01 Jul 2020 12:00) (91 - 113)  BP: 133/74 (01 Jul 2020 12:00) (93/61 - 142/65)  BP(mean): 94 (01 Jul 2020 12:00) (69 - 102)  RR: 28 (01 Jul 2020 12:00) (14 - 28)  SpO2: 100% (01 Jul 2020 12:00) (100% - 100%)      06-30-20 @ 07:01  -  07-01-20 @ 07:00  --------------------------------------------------------  IN: 1910 mL / OUT: 1100 mL / NET: 810 mL    07-01-20 @ 07:01  -  07-01-20 @ 12:28  --------------------------------------------------------  IN: 390 mL / OUT: 0 mL / NET: 390 mL      Mode: AC/ CMV (Assist Control/ Continuous Mandatory Ventilation), RR (machine): 14, TV (machine): 450, FiO2: 30, PEEP: 5, ITime: 1, MAP: 11, PIP: 19  acetaminophen    Suspension .. 650 milliGRAM(s) Oral every 6 hours PRN  amLODIPine   Tablet 10 milliGRAM(s) Oral daily  chlorhexidine 0.12% Liquid 15 milliLiter(s) Oral Mucosa every 12 hours  chlorhexidine 4% Liquid 1 Application(s) Topical <User Schedule>  dextrose 5%. 1000 milliLiter(s) (50 mL/Hr) IV Continuous <Continuous>  dextrose 50% Injectable 25 Gram(s) IV Push once  doxazosin 2 milliGRAM(s) Oral at bedtime  enoxaparin Injectable 30 milliGRAM(s) SubCutaneous two times a day  fosphenytoin IVPB 200 milliGRAM(s) PE IV Intermittent every 8 hours  glycopyrrolate Injectable 0.1 milliGRAM(s) IV Push three times a day PRN  insulin lispro (HumaLOG) corrective regimen sliding scale   SubCutaneous every 6 hours  insulin NPH human recombinant 50 Unit(s) SubCutaneous <User Schedule>  insulin NPH human recombinant 6 Unit(s) SubCutaneous <User Schedule>  insulin NPH human recombinant 10 Unit(s) SubCutaneous <User Schedule>  insulin NPH human recombinant 28 Unit(s) SubCutaneous <User Schedule>  lacosamide Solution 200 milliGRAM(s) Oral two times a day  levETIRAcetam  Solution 1000 milliGRAM(s) Oral two times a day  levothyroxine 150 MICROGram(s) Oral daily  metoprolol tartrate 25 milliGRAM(s) Oral every 6 hours  pantoprazole  Injectable 40 milliGRAM(s) IV Push daily  predniSONE   Tablet 15 milliGRAM(s) Oral <User Schedule>  predniSONE   Tablet 5 milliGRAM(s) Oral at bedtime  simvastatin 20 milliGRAM(s) Oral at bedtime  sodium chloride 0.9% lock flush 10 milliLiter(s) IV Push every 1 hour PRN  traMADol 50 milliGRAM(s) Oral every 4 hours PRN                          8.8    12.70 )-----------( 300      ( 01 Jul 2020 05:21 )             29.0     07-01    143  |  105  |  26<H>  ----------------------------<  200<H>  4.5   |  22  |  0.65    Ca    9.4      01 Jul 2020 05:20  Phos  3.5     07-01  Mg     2.1     07-01    TPro  6.5  /  Alb  3.5  /  TBili  <0.1<L>  /  DBili  x   /  AST  36  /  ALT  24  /  AlkPhos  140<H>  07-01    LIVER FUNCTIONS - ( 01 Jul 2020 05:20 )  Alb: 3.5 g/dL / Pro: 6.5 g/dL / ALK PHOS: 140 U/L / ALT: 24 U/L / AST: 36 U/L / GGT: x                      PHYSICAL EXAM:  Neurological:  intubated, no EO,  does not track, disconjugate gaze, pupils 3mm reactive, +cough/gag, not follow commands, upper extremities tremors when stimulated w/ trace bilateral withdrawal, LE trace withdrawal  to noxious stim.    Cardiovascular: +s1, s2, borderline tachycardic.   Pulmonary: No respiratory distress, in line suction catheter bloody secretions overnight improved now this morning  Gastrointestinal: soft, non-distended, non-tender  abd: soft, non-tender, nl bs ,+ skin lesion  Extremities: Warm, dry  + skin tear on buttocks SUMMARY: 65 yo F with past medical hx of hypothyroidism, HTN, IDDM, COPD/CHRIS on home O2, RA (on Prednisone), SBO s/p ex lap with lysis of adhesions c/b evisceration,  recent hospitalization in 3/2020 with encephalopathy, significantly hypothyroid (, without myxedema coma), small (stable) right sided SDH, UTI, catatonic reaction to VPA (now resolved) and DKA and psychiatric issues, 5/2020 hospitalization for GIB, Emphysematous cystitis, endometritis, Sigmoid colitis, morbid obesity, functional quadriplegia presented on 5/30 with AMS and was found to have L frontal IPH. Per report, patient seized at home prior to presentation, but has not noted to be seizing during the hospitalization. Continued Goals of care discussion with family; palliative and ethics involved    HCP: Norris Rush, Son [phone]; Daughter, Heavenly Schmidt [phone]; son asks that only him or his sister, Heavenly be contacted for patient updates/information as they are both proxy's for patient (29 May 2020 23:41)    ADMISSION SCORES:  GCS: 5 [E2 VNT M3]  ICH: 2    HOSP COURSE:   5/29: DDAVP  6/9: Found to have LUE spasmodic movements. Aborted after holding LUE.   6/9: [facility] meeting with palliative care, ethics.    OVERNIGHT EVENTS: Afebrile. Tolerated CPAP      ICU Vital Signs Last 24 Hrs  T(C): 37.9 (01 Jul 2020 12:00), Max: 37.9 (01 Jul 2020 12:00)  T(F): 100.2 (01 Jul 2020 12:00), Max: 100.2 (01 Jul 2020 12:00)  HR: 106 (01 Jul 2020 12:00) (91 - 113)  BP: 133/74 (01 Jul 2020 12:00) (93/61 - 142/65)  BP(mean): 94 (01 Jul 2020 12:00) (69 - 102)  RR: 28 (01 Jul 2020 12:00) (14 - 28)  SpO2: 100% (01 Jul 2020 12:00) (100% - 100%)      06-30-20 @ 07:01  -  07-01-20 @ 07:00  --------------------------------------------------------  IN: 1910 mL / OUT: 1100 mL / NET: 810 mL    07-01-20 @ 07:01  -  07-01-20 @ 12:28  --------------------------------------------------------  IN: 390 mL / OUT: 0 mL / NET: 390 mL      Mode: AC/ CMV (Assist Control/ Continuous Mandatory Ventilation), RR (machine): 14, TV (machine): 450, FiO2: 30, PEEP: 5, ITime: 1, MAP: 11, PIP: 19  acetaminophen    Suspension .. 650 milliGRAM(s) Oral every 6 hours PRN  amLODIPine   Tablet 10 milliGRAM(s) Oral daily  chlorhexidine 0.12% Liquid 15 milliLiter(s) Oral Mucosa every 12 hours  chlorhexidine 4% Liquid 1 Application(s) Topical <User Schedule>  dextrose 5%. 1000 milliLiter(s) (50 mL/Hr) IV Continuous <Continuous>  dextrose 50% Injectable 25 Gram(s) IV Push once  doxazosin 2 milliGRAM(s) Oral at bedtime  enoxaparin Injectable 30 milliGRAM(s) SubCutaneous two times a day  fosphenytoin IVPB 200 milliGRAM(s) PE IV Intermittent every 8 hours  glycopyrrolate Injectable 0.1 milliGRAM(s) IV Push three times a day PRN  insulin lispro (HumaLOG) corrective regimen sliding scale   SubCutaneous every 6 hours  insulin NPH human recombinant 50 Unit(s) SubCutaneous <User Schedule>  insulin NPH human recombinant 6 Unit(s) SubCutaneous <User Schedule>  insulin NPH human recombinant 10 Unit(s) SubCutaneous <User Schedule>  insulin NPH human recombinant 28 Unit(s) SubCutaneous <User Schedule>  lacosamide Solution 200 milliGRAM(s) Oral two times a day  levETIRAcetam  Solution 1000 milliGRAM(s) Oral two times a day  levothyroxine 150 MICROGram(s) Oral daily  metoprolol tartrate 25 milliGRAM(s) Oral every 6 hours  pantoprazole  Injectable 40 milliGRAM(s) IV Push daily  predniSONE   Tablet 15 milliGRAM(s) Oral <User Schedule>  predniSONE   Tablet 5 milliGRAM(s) Oral at bedtime  simvastatin 20 milliGRAM(s) Oral at bedtime  sodium chloride 0.9% lock flush 10 milliLiter(s) IV Push every 1 hour PRN  traMADol 50 milliGRAM(s) Oral every 4 hours PRN                          8.8    12.70 )-----------( 300      ( 01 Jul 2020 05:21 )             29.0     07-01    143  |  105  |  26<H>  ----------------------------<  200<H>  4.5   |  22  |  0.65    Ca    9.4      01 Jul 2020 05:20  Phos  3.5     07-01  Mg     2.1     07-01    TPro  6.5  /  Alb  3.5  /  TBili  <0.1<L>  /  DBili  x   /  AST  36  /  ALT  24  /  AlkPhos  140<H>  07-01    LIVER FUNCTIONS - ( 01 Jul 2020 05:20 )  Alb: 3.5 g/dL / Pro: 6.5 g/dL / ALK PHOS: 140 U/L / ALT: 24 U/L / AST: 36 U/L / GGT: x           CT head 6/30:  IMPRESSION:    Previously seen large left frontal parenchymal hemorrhage has undergone normal temporal evolution, demonstrating overall decreased size, density, and mass effect.  Similar small hemorrhage layering in the occipital horns.  The ventricles, previously slitlike, have reexpanded. No midline shift or effacement of the basal cisterns.  Apparent cytotoxic edema in the left parasagittal frontoparietal region (2-24). Residual vasogenic edema in the left frontoparietal region.  New increased attenuation involving the bilateral parasagittal and left lateral frontoparietal cortices. This could be related to recent seizure or hypoperfusion/hypoxia.  Dr. Eid discussed these findings with physician's assistant Jesús Kowalski on 6/30/2020 9:40 AM with read back.      PHYSICAL EXAM:  Neurological: Intubated, EO to noxious stim, does not track, dysconjugate gaze, pupils 3mm reactive, +cough/gag, does not follow commands, upper extremities tremors when stimulated, LEs TF to noxious stim.    Cardiovascular: +S1, S2, borderline tachycardic.   Pulmonary: No respiratory distress  Gastrointestinal: soft, non-distended, non-tender  abd: soft, non-tender, nl bs ,+ skin lesion  Extremities: Warm, dry  + skin tear on buttocks SUMMARY: 63 yo F with past medical hx of hypothyroidism, HTN, IDDM, COPD/CHRIS on home O2, RA (on Prednisone), SBO s/p ex lap with lysis of adhesions c/b evisceration,  recent hospitalization in 3/2020 with encephalopathy, significantly hypothyroid (, without myxedema coma), small (stable) right sided SDH, UTI, catatonic reaction to VPA (now resolved) and DKA and psychiatric issues, 5/2020 hospitalization for GIB, Emphysematous cystitis, endometritis, Sigmoid colitis, morbid obesity, functional quadriplegia presented on 5/30 with AMS and was found to have L frontal IPH. Per report, patient seized at home prior to presentation, but has not noted to be seizing during the hospitalization. Continued Goals of care discussion with family; palliative and ethics involved    HCP: Norris Rush, Son [phone]; Daughter, Heavenly Schmidt [phone]; son asks that only him or his sister, Heavenly be contacted for patient updates/information as they are both proxy's for patient (29 May 2020 23:41)    ADMISSION SCORES:  GCS: 5 [E2 VNT M3]  ICH: 2    HOSP COURSE:   5/29: DDAVP  6/9: Found to have LUE spasmodic movements. Aborted after holding LUE.   6/9: [facility] meeting with palliative care, ethics.    OVERNIGHT EVENTS: Afebrile. Tolerated CPAP      ICU Vital Signs Last 24 Hrs  T(C): 37.9 (01 Jul 2020 12:00), Max: 37.9 (01 Jul 2020 12:00)  T(F): 100.2 (01 Jul 2020 12:00), Max: 100.2 (01 Jul 2020 12:00)  HR: 106 (01 Jul 2020 12:00) (91 - 113)  BP: 133/74 (01 Jul 2020 12:00) (93/61 - 142/65)  BP(mean): 94 (01 Jul 2020 12:00) (69 - 102)  RR: 28 (01 Jul 2020 12:00) (14 - 28)  SpO2: 100% (01 Jul 2020 12:00) (100% - 100%)      06-30-20 @ 07:01  -  07-01-20 @ 07:00  --------------------------------------------------------  IN: 1910 mL / OUT: 1100 mL / NET: 810 mL    07-01-20 @ 07:01  -  07-01-20 @ 12:28  --------------------------------------------------------  IN: 390 mL / OUT: 0 mL / NET: 390 mL      Mode: AC/ CMV (Assist Control/ Continuous Mandatory Ventilation), RR (machine): 14, TV (machine): 450, FiO2: 30, PEEP: 5, ITime: 1, MAP: 11, PIP: 19  acetaminophen    Suspension .. 650 milliGRAM(s) Oral every 6 hours PRN  amLODIPine   Tablet 10 milliGRAM(s) Oral daily  chlorhexidine 0.12% Liquid 15 milliLiter(s) Oral Mucosa every 12 hours  chlorhexidine 4% Liquid 1 Application(s) Topical <User Schedule>  dextrose 5%. 1000 milliLiter(s) (50 mL/Hr) IV Continuous <Continuous>  dextrose 50% Injectable 25 Gram(s) IV Push once  doxazosin 2 milliGRAM(s) Oral at bedtime  enoxaparin Injectable 30 milliGRAM(s) SubCutaneous two times a day  fosphenytoin IVPB 200 milliGRAM(s) PE IV Intermittent every 8 hours  glycopyrrolate Injectable 0.1 milliGRAM(s) IV Push three times a day PRN  insulin lispro (HumaLOG) corrective regimen sliding scale   SubCutaneous every 6 hours  insulin NPH human recombinant 50 Unit(s) SubCutaneous <User Schedule>  insulin NPH human recombinant 6 Unit(s) SubCutaneous <User Schedule>  insulin NPH human recombinant 10 Unit(s) SubCutaneous <User Schedule>  insulin NPH human recombinant 28 Unit(s) SubCutaneous <User Schedule>  lacosamide Solution 200 milliGRAM(s) Oral two times a day  levETIRAcetam  Solution 1000 milliGRAM(s) Oral two times a day  levothyroxine 150 MICROGram(s) Oral daily  metoprolol tartrate 25 milliGRAM(s) Oral every 6 hours  pantoprazole  Injectable 40 milliGRAM(s) IV Push daily  predniSONE   Tablet 15 milliGRAM(s) Oral <User Schedule>  predniSONE   Tablet 5 milliGRAM(s) Oral at bedtime  simvastatin 20 milliGRAM(s) Oral at bedtime  sodium chloride 0.9% lock flush 10 milliLiter(s) IV Push every 1 hour PRN  traMADol 50 milliGRAM(s) Oral every 4 hours PRN                          8.8    12.70 )-----------( 300      ( 01 Jul 2020 05:21 )             29.0     07-01    143  |  105  |  26<H>  ----------------------------<  200<H>  4.5   |  22  |  0.65    Ca    9.4      01 Jul 2020 05:20  Phos  3.5     07-01  Mg     2.1     07-01    TPro  6.5  /  Alb  3.5  /  TBili  <0.1<L>  /  DBili  x   /  AST  36  /  ALT  24  /  AlkPhos  140<H>  07-01    LIVER FUNCTIONS - ( 01 Jul 2020 05:20 )  Alb: 3.5 g/dL / Pro: 6.5 g/dL / ALK PHOS: 140 U/L / ALT: 24 U/L / AST: 36 U/L / GGT: x           CT head 6/30:  IMPRESSION:    Previously seen large left frontal parenchymal hemorrhage has undergone normal temporal evolution, demonstrating overall decreased size, density, and mass effect.  Similar small hemorrhage layering in the occipital horns.  The ventricles, previously slitlike, have reexpanded. No midline shift or effacement of the basal cisterns.  Apparent cytotoxic edema in the left parasagittal frontoparietal region (2-24). Residual vasogenic edema in the left frontoparietal region.  New increased attenuation involving the bilateral parasagittal and left lateral frontoparietal cortices. This could be related to recent seizure or hypoperfusion/hypoxia.  Dr. Eid discussed these findings with physician's assistant Jesús Kowalski on 6/30/2020 9:40 AM with read back.      PHYSICAL EXAM:  Neurological: Intubated, EO to noxious stim, does not track, dysconjugate gaze, pupils 3mm reactive, +cough/gag, does not follow commands, upper extremities tremors when stimulated, LEs TF to noxious stim.    Cardiovascular: +S1, S2, borderline tachycardic.   Pulmonary: No respiratory distress  Gastrointestinal: soft, non-distended, non-tender  abd: soft, non-tender, nl bs ,+ skin lesion  Extremities: Warm, dry  + skin tear on buttocks      MPRESSION:    Previously seen large left frontal parenchymal hemorrhage has undergone normal temporal evolution, demonstrating overall decreased size, density, and mass effect.    Similar small hemorrhage layering in the occipital horns.    The ventricles, previously slitlike, have reexpanded. No midline shift or effacement of the basal cisterns.    Apparent cytotoxic edema in the left parasagittal frontoparietal region (2-24). Residual vasogenic edema in the left frontoparietal region.    New increased attenuation involving the bilateral parasagittal and left lateral frontoparietal cortices. This could be related to recent seizure or hypoperfusion/hypoxia.

## 2020-07-01 NOTE — PROGRESS NOTE ADULT - SUBJECTIVE AND OBJECTIVE BOX
Diabetes Follow up note:    Chief complaint: T2DM     Interval Hx: BG values 120s-low 200s. Tolerating feeds at goal. Ongoing EEG.     Review of Systems:  unresponsive.     MEDS:  insulin lispro (HumaLOG) corrective regimen sliding scale   SubCutaneous every 6 hours  insulin NPH human recombinant 50 Unit(s) SubCutaneous <User Schedule>  insulin NPH human recombinant 6 Unit(s) SubCutaneous <User Schedule>  insulin NPH human recombinant 10 Unit(s) SubCutaneous <User Schedule>  insulin NPH human recombinant 28 Unit(s) SubCutaneous <User Schedule>  levothyroxine 150 MICROGram(s) Oral daily  predniSONE   Tablet 15 milliGRAM(s) Oral <User Schedule>  predniSONE   Tablet 5 milliGRAM(s) Oral at bedtime  simvastatin 20 milliGRAM(s) Oral at bedtime      Allergies    Depakote (Other)  Seroquel (Other (Severe))        PE:  General: Female lying in bed. NAD.   Vital Signs Last 24 Hrs  T(C): 37.9 (01 Jul 2020 12:00), Max: 37.9 (01 Jul 2020 12:00)  T(F): 100.2 (01 Jul 2020 12:00), Max: 100.2 (01 Jul 2020 12:00)  HR: 95 (01 Jul 2020 12:32) (94 - 113)  BP: 133/74 (01 Jul 2020 12:00) (93/61 - 142/65)  BP(mean): 94 (01 Jul 2020 12:00) (69 - 102)  RR: 28 (01 Jul 2020 12:00) (14 - 28)  SpO2: 100% (01 Jul 2020 12:32) (100% - 100%)  HEENT: ETT in place. On vent.   Abd: Soft, NT,ND, Obese.   Extremities: Warm  Neuro: unresponsive     LABS:  POCT Blood Glucose.: 166 mg/dL (07-01-20 @ 11:45)  POCT Blood Glucose.: 206 mg/dL (07-01-20 @ 05:08)  POCT Blood Glucose.: 124 mg/dL (06-30-20 @ 23:06)  POCT Blood Glucose.: 148 mg/dL (06-30-20 @ 17:09)  POCT Blood Glucose.: 272 mg/dL (06-30-20 @ 12:03)  POCT Blood Glucose.: 229 mg/dL (06-30-20 @ 05:14)  POCT Blood Glucose.: 109 mg/dL (06-29-20 @ 23:25)  POCT Blood Glucose.: 114 mg/dL (06-29-20 @ 17:16)  POCT Blood Glucose.: 228 mg/dL (06-29-20 @ 11:55)  POCT Blood Glucose.: 259 mg/dL (06-29-20 @ 05:34)  POCT Blood Glucose.: 132 mg/dL (06-28-20 @ 22:54)  POCT Blood Glucose.: 188 mg/dL (06-28-20 @ 17:09)                            8.8    12.70 )-----------( 300      ( 01 Jul 2020 05:21 )             29.0       07-01    143  |  105  |  26<H>  ----------------------------<  200<H>  4.5   |  22  |  0.65    Ca    9.4      01 Jul 2020 05:20  Phos  3.5     07-01  Mg     2.1     07-01    TPro  6.5  /  Alb  3.5  /  TBili  <0.1<L>  /  DBili  x   /  AST  36  /  ALT  24  /  AlkPhos  140<H>  07-01      Thyroid Function Tests:  06-11 @ 13:58 TSH 8.26 FreeT4 -- T3 55 Anti TPO -- Anti Thyroglobulin Ab -- TSI --  06-04 @ 01:58 TSH -- FreeT4 2.7 T3 -- Anti TPO -- Anti Thyroglobulin Ab -- TSI --      A1C with Estimated Average Glucose Result: 7.4 % (05-30-20 @ 04:43)  A1C with Estimated Average Glucose Result: 6.4 % (05-14-20 @ 16:23)  A1C with Estimated Average Glucose Result: 6.5 % (05-13-20 @ 08:24)  Hemoglobin A1C, Whole Blood: 9.0 % (01-28-20 @ 19:30)  Hemoglobin A1C, Whole Blood: 9.2 % (01-27-20 @ 17:36)          Contact number: bhavna [phone] or [phone]

## 2020-07-01 NOTE — CHART NOTE - NSCHARTNOTEFT_GEN_A_CORE
PHONE DISCUSSION:"  Discussed with pt's daughter Heavenly.  She stated that son, Norris, would like her to receive her home pain medications as she appears to be in pain.   At home, she takes tramadol, gabapentin and prednisone. These have been reordered.  Discussed placing pt on CPAP mode of ventilation and testing weaning parameters in the next 24 hours (after MRI done) and if meets extubation parameters, to extubate with goal of not re-intubating.   Daughter said that she will think about this and talk with Norris about this.   All other questions answered

## 2020-07-01 NOTE — PROGRESS NOTE ADULT - ATTENDING COMMENTS
I spent 40 minutes of critical care time examining patient, reviewing vitals, labs, medications, imaging and discussing with the team goals of care to prevent life-threatening in this patient who is at high risk for deterioration or death due to: ICH

## 2020-07-01 NOTE — PROGRESS NOTE ADULT - PROBLEM SELECTOR PLAN 2
-Levothyroxine 150mcg per PEG daily  -Hold TF 1 hour prior and after levothyroxine given. Do not give with other medications.  -discussed with RN  pager: [phone]   cell: [phone]  office: [phone]    -I spent a total time of 17 mins with the patient at bedside/on unit of which more than 50% of time was spent on counseling/coordination of care.

## 2020-07-01 NOTE — PROGRESS NOTE ADULT - ASSESSMENT
ICH   poor neurologic examination  Continued goals of care conversation, palliative and ethics involved  stop EEG with no seizures  increased PHT, recheck level  MRI brain

## 2020-07-01 NOTE — PROGRESS NOTE ADULT - SUBJECTIVE AND OBJECTIVE BOX
EEG showed no sz    labs/meds/imaging reviewed    Intubated, off sedation  no eye opening, no tracking, no following commands  PERRL 3mm   +corneals/cough/gag  bilat upper ext tremors with stim L>R  bilat LE TF

## 2020-07-01 NOTE — PROGRESS NOTE ADULT - PROBLEM SELECTOR PLAN 1
-test BG Q6h  --Adjust NPH as follows:   6am: c/w NPH 50 units sq   12pm: c/w NPH 28 units sq  6pm: c/w NPH 6 units sq   12am: Increase NPH 12 units sq  hold NPH if Tube feeds off. Can give 50% of dose if BG less than 100mg/dl)  -c/w Humalog moderate correction scale Q6h  -Please notify endocrine if any changes in steroid regimen or TF regimen so we can adjust insulin regimen accordingly.

## 2020-07-01 NOTE — PROGRESS NOTE ADULT - ASSESSMENT
ASSESSMENT:  Left frontal intracerebral hemorrhage, vegetative state  Continued goals of care conversation, palliative and ethics involved      NEURO:  Neuro q4h check  last stable head ct june 2, 2020 showing large left frontal iph.  seizure management: Keppra and Vimpat, loaded 1 gram phosphenytoin overnight after given ativan 2mg for shaking episode.  will get head ct to evaluate prior bleed, will get VEEG after.  Pain management with Tylenol & fentanyl prn  Given previous psych history and workup not revealing any cause, NMDA encephalitis testing sent -> NMDA AB neg.    Trach and PEG being discussed with family. no decision yet   Activity: [] OOB as tolerated [] Bedrest [X] PT [X] OT [] PMNR      PULM:  Intubated since 5/29-  awaiting family decision on goals of care - not likely to withstand extubation, unclear if family will elect to trach yet.  H/O of COPD   CPAP as tolerated.   SATS >96 %         CV: Intermittently tachy   Keep -160  Anasarca- stable, has received lasix in past  HTN: Continue norvasc   sinus tachycardia, metoprolol to 25 q6h  Statin for HLD        RENAL:  IVL. Monitor Is/Os  iNCONT  on cardura for urinary retention      GI:  Diet: On Glucerna OGT   Continue Banana flakes and metamucil , LBM 6/29  GI prophylaxis [] not indicated [X] PO PPI [] other: protonix switched from liquid that must be mixed with apple juice to iv to avoid additional glucose intake      ENDO: S/P hyperglycemia - IR DM  Endo Following for adjustment of NPH; will follow up on sugar levels  Continue PO synthroid  Continue prednisone 15mg, 5 mg at bedtime (was on it at home for RA). Likely etiology of bruising  Goal euglycemia (-180)      HEME/ONC:   On SQL for DVT ppx- 30mg SQ q12.  VTE prophylaxis: [X] SCDs [X] chemoprophylaxis [X] high risk of DVT/PE on admission due to: bed bound at baseline  LE doppler 5/30- No DVT    ID:  Afebrile   Ertapnem for ESBL found growing in urine culture on 6/4 - ended on  - 6/14    MISC: Awaiting family decision regarding trach/PEG vs full code, vs palliation.  extensive team discussion regarding treatment plan of patient today.  will offer family every other day medical updates.  RIJ TLC removed today 6/29.  extended dwell catheter PIV placed R forearm.    SOCIAL/FAMILY:  [] updated family via phone  [] family meeting    CODE STATUS:  [X] Full Code     DISPOSITION:  [X] ICU    [X] Patient is at high risk of neurologic deterioration/death due to: resp failure, seizures, hemorrhage. ASSESSMENT:  Left frontal intracerebral hemorrhage, vegetative state  Continued goals of care conversation, palliative and ethics involved.       NEURO:  Neuro Q4h check  Last head CT 6/2020 showing large left frontal IPH  CT head 6/30: New increased attenuation involving the bilateral parasagittal and left lateral frontoparietal cortices.   Will obtain MRI 7/1 once available  Seizure management: Keppra and Vimpat, fosphenytoin  7/1: Increase fosphenytoin since level low phenytoin level 7.4.  Pain management prn. Not on sedation  Given previous psych history and workup not revealing any cause, NMDA encephalitis testing sent -> NMDA AB neg.    Trach and PEG being discussed with family. No decision as of yet   Activity: [] OOB as tolerated [] Bedrest [X] PT [X] OT [] PMNR      PULM:  Intubated since 5/29-  awaiting family decision on goals of care-, unclear if family will elect to trach yet.  H/O of COPD   CPAP as tolerated 5/5 on 7/1  CXR 7/1 re ETT  SATS >96 %         CV: Intermittently tachy   Keep -160  Anasarca- stable, has received lasix in past  HTN: Continue norvasc   Sinus tachycardia, metoprolol  25 q6h  Statin for HLD        RENAL:  IVL. Monitor Is/Os  On cardura for urinary retention  Straight cath Q6      GI:  Diet: On Glucerna OGT   Continue Banana flakes and metamucil, LBM 7/1  GI prophylaxis [] not indicated [x] PPI. Protonix is home med.      ENDO: S/P hyperglycemia - IR DM  Endocrine following for adjustment of NPH;   Continue PO synthroid  Continue prednisone 15mg, 5 mg at bedtime (was on it at home for RA). Likely etiology of bruising  Goal euglycemia (-180)      HEME/ONC:   On SQL for DVT ppx- 30mg SQ q12.  VTE prophylaxis: [X] SCDs [X] chemoprophylaxis [X] high risk of DVT/PE on admission due to: bed bound at baseline  LE doppler 5/30- No DVT  Repeat LE dopplers    ID:  Afebrile   Ertapenem for ESBL found growing in urine culture on 6/4 - ended on  - 6/14      MISC: Awaiting family decision regarding trach/PEG vs palliation. Met with family/ethics and palliative care several times. Meeting with Lesly 7/1 (requested by family) today to discuss goals of care.       Access: Extended dwell catheter PIV placed R forearm.    CODE STATUS:  [X] Full Code     DISPOSITION:  [X] ICU    [X] Patient is at high risk of neurologic deterioration/death due to: resp failure, seizures, hemorrhage. ASSESSMENT:  Left frontal intracerebral hemorrhage, poor neurologic examination  Continued goals of care conversation, palliative and ethics involved.       NEURO:  Neuro Q4h check  Last head CT showed   CT head 6/30: New increased attenuation involving the bilateral parasagittal and left lateral frontoparietal cortices. patient was never hypoxemic, and no seizures on EEG, will get MRI brain   Seizure management: Keppra and Vimpat, fosphenytoin  7/1: Increase fosphenytoin to 200 mg q 8 hr  since level low phenytoin level 7.4.  Pain management prn. Not on sedation  Given previous psych history and workup not revealing any cause, NMDA encephalitis testing sent -> NMDA AB neg.    Trach and PEG being discussed with family. No decision as of yet   Activity: [] OOB as tolerated [] Bedrest [X] PT [X] OT [] PMNR      PULM:  Intubated since 5/29-  awaiting family decision on goals of care-, unclear if family will elect to trach yet.  H/O of COPD   CPAP as tolerated 5/5 on 7/1  CXR 7/1 re ETT  SATS >96 %    will attempt to extubate       CV: Intermittently tachy   Keep -160  HTN: Continue norvasc   Sinus tachycardia, metoprolol  25 q6h  Statin for HLD        RENAL:  IVL. Monitor Is/Os  On cardura for urinary retention  Straight cath Q6      GI:  Diet: On Glucerna OGT   Continue Banana flakes and metamucil, LBM 7/1  GI prophylaxis [] not indicated [x] PPI. Protonix is home med.      ENDO: S/P hyperglycemia - IR DM  Endocrine following for adjustment of NPH;   Continue PO synthroid  Continue prednisone 15mg, 5 mg at bedtime (was on it at home for RA). Likely etiology of bruising  Goal euglycemia (-180)      HEME/ONC:   On SQL for DVT ppx- 30mg SQ q12.  VTE prophylaxis: [X] SCDs [X] chemoprophylaxis [X] high risk of DVT/PE on admission due to: bed bound at baseline  LE doppler 5/30- No DVT  Repeat LE dopplers    ID:  Afebrile   Ertapenem for ESBL found growing in urine culture on 6/4 - ended on  - 6/14      MISC: Awaiting family decision regarding trach/PEG vs palliation. Met with family/ethics and palliative care several times. Meeting with Lesly 7/1 (requested by family) today to discuss goals of care.       Access: Extended dwell catheter PIV placed R forearm.    CODE STATUS:  [X] Full Code     DISPOSITION:  [X] ICU    [X] Patient is at high risk of neurologic deterioration/death due to: resp failure, seizures, hemorrhage.     35 critical care time

## 2020-07-01 NOTE — EEG REPORT - NS EEG TEXT BOX
[facility]   COMPREHENSIVE EPILEPSY CENTER   REPORT OF LONG-TERM VIDEO EEG     [facility]: [address], [place] [address], Ph#: [phone]  [facility]: [id number] [facility] [place] [address], Ph#: [phone]  [facility]: [address] [place] [address], Ph#: [phone]    Patient Name: AFTAB BASS  Age and : 64y (10)  MRN #: [id number]  Location: [address]  Referring Physician: Kimo Thomason    Start Time/Date: 11:00 on 20  End Time/Date: 08:00 on 20  Duration: 12hrs    EEG disconnected from 19:50 to 04:40    _____________________________________________________________  STUDY INFORMATION    EEG Recording Technique:  The patient underwent continuous Video-EEG monitoring, using Telemetry System hardware on the XLTek Digital System. EEG and video data were stored on a computer hard drive with important events saved in digital archive files. The material was reviewed by a physician (electroencephalographer / epileptologist) on a daily basis. Moy and seizure detection algorithms were utilized and reviewed. An EEG Technician attended to the patient, and was available throughout daytime work hours.  The epilepsy center neurologist was available in person or on call 24-hours per day.    EEG Placement and Labeling of Electrodes:  The EEG was performed utilizing 20 channel referential EEG connections (coronal over temporal over parasagittal montage) using all standard 10-20 electrode placements with EKG, with additional electrodes placed in the inferior temporal region using the modified 10-10 montage electrode placements for elective admissions, or if deemed necessary. Recording was at a sampling rate of 256 samples per second per channel. Time synchronized digital video recording was done simultaneously with EEG recording. A low light infrared camera was used for low light recording.     _____________________________________________________________  HISTORY    Patient is a 64y old  Female who presents with a chief complaint of ICH, SAH w/ IVH (2020 11:16)      PERTINENT MEDICATION:  MEDICATIONS  (STANDING):  amLODIPine   Tablet 10 milliGRAM(s) Oral daily  chlorhexidine 0.12% Liquid 15 milliLiter(s) Oral Mucosa every 12 hours  chlorhexidine 4% Liquid 1 Application(s) Topical <User Schedule>  dextrose 5%. 1000 milliLiter(s) (50 mL/Hr) IV Continuous <Continuous>  dextrose 50% Injectable 25 Gram(s) IV Push once  doxazosin 2 milliGRAM(s) Oral at bedtime  enoxaparin Injectable 30 milliGRAM(s) SubCutaneous two times a day  fosphenytoin IVPB 200 milliGRAM(s) PE IV Intermittent every 12 hours  insulin lispro (HumaLOG) corrective regimen sliding scale   SubCutaneous every 6 hours  insulin NPH human recombinant 50 Unit(s) SubCutaneous <User Schedule>  insulin NPH human recombinant 6 Unit(s) SubCutaneous <User Schedule>  insulin NPH human recombinant 10 Unit(s) SubCutaneous <User Schedule>  insulin NPH human recombinant 28 Unit(s) SubCutaneous <User Schedule>  lacosamide Solution 200 milliGRAM(s) Oral two times a day  levETIRAcetam  Solution 1000 milliGRAM(s) Oral two times a day  levothyroxine 150 MICROGram(s) Oral daily  metoprolol tartrate 25 milliGRAM(s) Oral every 6 hours  pantoprazole  Injectable 40 milliGRAM(s) IV Push daily  predniSONE   Tablet 15 milliGRAM(s) Oral <User Schedule>  predniSONE   Tablet 5 milliGRAM(s) Oral at bedtime  simvastatin 20 milliGRAM(s) Oral at bedtime    _____________________________________________________________  STUDY INTERPRETATION    Findings: The background was continuous, spontaneously variable and reactive. No posterior dominant rhythm seen.    Background Slowing:  Diffuse theta and polymorphic delta slowing.    Focal Slowing:   Continuous theta/delta slowing in the right>left hemisphere.    Sleep Background:  Drowsiness was characterized by fragmentation, attenuation, and slowing of the background activity.    Sleep was characterized by the presence of sleep spindles.    Other Non-Epileptiform Findings:  None were present.    Interictal Epileptiform Activity:   Frequent sharp wave discharges over the right posterior temporal (P8) region.   Occasional sharp wave discharges over the right frontotemporal (F4, F8) region.    Events:  Clinical events: None recorded.  Seizures: None recorded.    Activation Procedures:   Hyperventilation was not performed.    Photic stimulation was not performed.     Artifacts:  Intermittent myogenic and movement artifacts were noted.    ECG:  The heart rate on single channel ECG was predominantly between 70-80 BPM.    _____________________________________________________________  EEG SUMMARY/CLASSIFICATION    Abnormal EEG in an altered patient.  - Frequent sharp wave discharges over the right posterior temporal (P8) region.   - Occasional sharp wave discharges over the right frontotemporal (F4, F8) region.  - Continuous left greater than right hemispheric slowing  - Moderate generalized slowing.    _____________________________________________________________  EEG IMPRESSION/CLINICAL CORRELATE    Abnormal EEG study.  1. Potential epileptogenic foci in the right posterior temporal and right frontotemporal regions.  2. Structural or functional abnormality in the left>right hemisphere.   3. Moderate nonspecific diffuse or multifocal cerebral dysfunction.   4. No seizure seen.      Preliminary Fellow Report  _____________________________________________________________    Gail Carolina MD  Epilepsy Fellow [facility]   COMPREHENSIVE EPILEPSY CENTER   REPORT OF LONG-TERM VIDEO EEG     [facility]: [address], [place] [address], Ph#: [phone]  [facility]: [id number] [place] [address], Ph#: [phone]  [facility]: [address] [place] [address], Ph#: [phone]    Patient Name: AFTAB BASS  Age and : 64y (10)  MRN #: [id number]  Location: [address]  Referring Physician: Kimo Thomason    Start Time/Date: 11:00 on 20  End Time/Date: 08:00 on 20  Duration: 12hrs    EEG disconnected from 19:50 to 04:40    _____________________________________________________________  STUDY INFORMATION    EEG Recording Technique:  The patient underwent continuous Video-EEG monitoring, using Telemetry System hardware on the XLTek Digital System. EEG and video data were stored on a computer hard drive with important events saved in digital archive files. The material was reviewed by a physician (electroencephalographer / epileptologist) on a daily basis. Moy and seizure detection algorithms were utilized and reviewed. An EEG Technician attended to the patient, and was available throughout daytime work hours.  The epilepsy center neurologist was available in person or on call 24-hours per day.    EEG Placement and Labeling of Electrodes:  The EEG was performed utilizing 20 channel referential EEG connections (coronal over temporal over parasagittal montage) using all standard 10-20 electrode placements with EKG, with additional electrodes placed in the inferior temporal region using the modified 10-10 montage electrode placements for elective admissions, or if deemed necessary. Recording was at a sampling rate of 256 samples per second per channel. Time synchronized digital video recording was done simultaneously with EEG recording. A low light infrared camera was used for low light recording.     _____________________________________________________________  HISTORY    Patient is a 64y old  Female who presents with a chief complaint of ICH, SAH w/ IVH (2020 11:16)    PERTINENT MEDICATION:  MEDICATIONS  (STANDING):  amLODIPine   Tablet 10 milliGRAM(s) Oral daily  doxazosin 2 milliGRAM(s) Oral at bedtime  enoxaparin Injectable 30 milliGRAM(s) SubCutaneous two times a day  fosphenytoin IVPB 200 milliGRAM(s) PE IV Intermittent every 12 hours  insulin lispro (HumaLOG) corrective regimen sliding scale   SubCutaneous every 6 hours  lacosamide Solution 200 milliGRAM(s) Oral two times a day  levETIRAcetam  Solution 1000 milliGRAM(s) Oral two times a day  levothyroxine 150 MICROGram(s) Oral daily  metoprolol tartrate 25 milliGRAM(s) Oral every 6 hours  pantoprazole  Injectable 40 milliGRAM(s) IV Push daily  predniSONE   Tablet 15 milliGRAM(s) Oral <User Schedule>  predniSONE   Tablet 5 milliGRAM(s) Oral at bedtime  simvastatin 20 milliGRAM(s) Oral at bedtime    _____________________________________________________________  STUDY INTERPRETATION    Findings: The background was continuous, spontaneously variable and reactive. No posterior dominant rhythm seen.    Background Slowing:  Diffuse theta and polymorphic delta slowing.    Focal Slowing:   Continuous theta/delta slowing in the right>left hemisphere.    Sleep Background:  Drowsiness was characterized by fragmentation, attenuation, and slowing of the background activity.    Sleep was characterized by the presence of sleep spindles.    Other Non-Epileptiform Findings:  None were present.    Interictal Epileptiform Activity:   Occasional sharp wave discharges over the right posterior temporal (P8) region.   Occasional sharp wave discharges over the right frontotemporal (F4, F8) region.    Events:  Clinical events: None recorded.  Seizures: None recorded.    Activation Procedures:   Hyperventilation was not performed.    Photic stimulation was not performed.     Artifacts:  Intermittent myogenic and movement artifacts were noted.    ECG:  The heart rate on single channel ECG was predominantly between 70-80 BPM.    _____________________________________________________________  EEG SUMMARY/CLASSIFICATION    Abnormal EEG in an altered patient.  - Occsaional sharp wave discharges over the right posterior temporal (P8) region.   - Occasional sharp wave discharges over the right frontotemporal (F4, F8) region.  - Asynchronous moderate generalized slowing, more prominent over the right.    _____________________________________________________________  EEG IMPRESSION/CLINICAL CORRELATE    Abnormal EEG study.  Potential epileptogenic foci in the right posterior temporal and right frontotemporal regions.  Moderate diffuse or multifocal cerebral dysfunction, more prominent on the right.   No seizure seen.    _____________________________________________________________    Gail Carolina MD  Epilepsy Fellow     MD PAYTON Austin  Director, Continuous EEG Monitoring Program, [facility]   and Epilepsy Fellowship ,   Department of Neurology, [facility]   COMPREHENSIVE EPILEPSY CENTER   REPORT OF LONG-TERM VIDEO EEG     [facility]: [address], [place] [address], Ph#: [phone]  [facility]: [id number] [facility] [place] [address], Ph#: [phone]  [facility]: [address] [place] [address], Ph#: [phone]    Patient Name: AFTAB BASS  Age and : 64y (10)  MRN #: [id number]  Location: [address]  Referring Physician: Kimo Thomason    Start Time/Date: 11:00 on 20  End Time/Date: 08:00 on 20  Duration: 12hrs    EEG disconnected from 19:50 to 04:40    _____________________________________________________________  STUDY INFORMATION    EEG Recording Technique:  The patient underwent continuous Video-EEG monitoring, using Telemetry System hardware on the XLTek Digital System. EEG and video data were stored on a computer hard drive with important events saved in digital archive files. The material was reviewed by a physician (electroencephalographer / epileptologist) on a daily basis. Moy and seizure detection algorithms were utilized and reviewed. An EEG Technician attended to the patient, and was available throughout daytime work hours.  The epilepsy center neurologist was available in person or on call 24-hours per day.    EEG Placement and Labeling of Electrodes:  The EEG was performed utilizing 20 channel referential EEG connections (coronal over temporal over parasagittal montage) using all standard 10-20 electrode placements with EKG, with additional electrodes placed in the inferior temporal region using the modified 10-10 montage electrode placements for elective admissions, or if deemed necessary. Recording was at a sampling rate of 256 samples per second per channel. Time synchronized digital video recording was done simultaneously with EEG recording. A low light infrared camera was used for low light recording.     _____________________________________________________________  HISTORY    Patient is a 64y old  Female who presents with a chief complaint of ICH, SAH w/ IVH (2020 11:16)    PERTINENT MEDICATION:  MEDICATIONS  (STANDING):  amLODIPine   Tablet 10 milliGRAM(s) Oral daily  doxazosin 2 milliGRAM(s) Oral at bedtime  enoxaparin Injectable 30 milliGRAM(s) SubCutaneous two times a day  fosphenytoin IVPB 200 milliGRAM(s) PE IV Intermittent every 12 hours  insulin lispro (HumaLOG) corrective regimen sliding scale   SubCutaneous every 6 hours  lacosamide Solution 200 milliGRAM(s) Oral two times a day  levETIRAcetam  Solution 1000 milliGRAM(s) Oral two times a day  levothyroxine 150 MICROGram(s) Oral daily  metoprolol tartrate 25 milliGRAM(s) Oral every 6 hours  pantoprazole  Injectable 40 milliGRAM(s) IV Push daily  predniSONE   Tablet 15 milliGRAM(s) Oral <User Schedule>  predniSONE   Tablet 5 milliGRAM(s) Oral at bedtime  simvastatin 20 milliGRAM(s) Oral at bedtime    _____________________________________________________________  STUDY INTERPRETATION    Findings: The background was continuous, spontaneously variable and reactive. No posterior dominant rhythm seen.    Background Slowing:  Diffuse theta and polymorphic delta slowing.    Focal Slowing:   Continuous theta/delta slowing in the right>left hemisphere.    Sleep Background:  Drowsiness was characterized by fragmentation, attenuation, and slowing of the background activity.    Sleep was characterized by the presence of sleep spindles.    Other Non-Epileptiform Findings:  None were present.    Interictal Epileptiform Activity:   Occasional sharp wave discharges over the right posterior temporal (P8) region.   Occasional sharp wave discharges over the right frontotemporal (F4, F8) region.    Events:  Clinical events: None recorded.  Seizures: None recorded.    Activation Procedures:   Hyperventilation was not performed.    Photic stimulation was not performed.     Artifacts:  Intermittent myogenic and movement artifacts were noted.    ECG:  The heart rate on single channel ECG was predominantly between 70-90 BPM.    EEG not recorded from 19:53-04:40  _____________________________________________________________  EEG SUMMARY/CLASSIFICATION    Abnormal EEG in an altered patient.  - Occsaional sharp wave discharges over the right posterior temporal (P8) region.   - Occasional sharp wave discharges over the right frontotemporal (F4, F8) region.  - Asynchronous moderate generalized slowing, more prominent over the right.    _____________________________________________________________  EEG IMPRESSION/CLINICAL CORRELATE    Abnormal EEG study.  Potential epileptogenic foci in the right posterior temporal and right frontotemporal regions.  Moderate diffuse or multifocal cerebral dysfunction, more prominent on the right.   No seizure seen.    _____________________________________________________________    Gail Carolina MD  Epilepsy Fellow     MD PAYTON Austin  Director, Continuous EEG Monitoring Program, [facility]   and Epilepsy Fellowship ,   Department of Neurology, [facility]   COMPREHENSIVE EPILEPSY CENTER   REPORT OF LONG-TERM VIDEO EEG     [facility]: [address], [place] [address], Ph#: [phone]  [facility]: [id number] [place] [address], Ph#: [phone]  [facility]: [address] [place] [address], Ph#: [phone]    Patient Name: AFTAB BASS  Age and : 64y (10)  MRN #: [id number]  Location: [address]  Referring Physician: Kimo Thomason    Start Time/Date: 11:00 on 20  End Time/Date: 08:00 on 20  Duration: 12hrs    EEG disconnected from 19:50 to 04:40    _____________________________________________________________  STUDY INFORMATION    EEG Recording Technique:  The patient underwent continuous Video-EEG monitoring, using Telemetry System hardware on the XLTek Digital System. EEG and video data were stored on a computer hard drive with important events saved in digital archive files. The material was reviewed by a physician (electroencephalographer / epileptologist) on a daily basis. Moy and seizure detection algorithms were utilized and reviewed. An EEG Technician attended to the patient, and was available throughout daytime work hours.  The epilepsy center neurologist was available in person or on call 24-hours per day.    EEG Placement and Labeling of Electrodes:  The EEG was performed utilizing 20 channel referential EEG connections (coronal over temporal over parasagittal montage) using all standard 10-20 electrode placements with EKG, with additional electrodes placed in the inferior temporal region using the modified 10-10 montage electrode placements for elective admissions, or if deemed necessary. Recording was at a sampling rate of 256 samples per second per channel. Time synchronized digital video recording was done simultaneously with EEG recording. A low light infrared camera was used for low light recording.     _____________________________________________________________  HISTORY    Patient is a 64y old  Female who presents with a chief complaint of ICH, SAH w/ IVH (2020 11:16)    PERTINENT MEDICATION:  MEDICATIONS  (STANDING):  amLODIPine   Tablet 10 milliGRAM(s) Oral daily  doxazosin 2 milliGRAM(s) Oral at bedtime  enoxaparin Injectable 30 milliGRAM(s) SubCutaneous two times a day  fosphenytoin IVPB 200 milliGRAM(s) PE IV Intermittent every 12 hours  insulin lispro (HumaLOG) corrective regimen sliding scale   SubCutaneous every 6 hours  lacosamide Solution 200 milliGRAM(s) Oral two times a day  levETIRAcetam  Solution 1000 milliGRAM(s) Oral two times a day  levothyroxine 150 MICROGram(s) Oral daily  metoprolol tartrate 25 milliGRAM(s) Oral every 6 hours  pantoprazole  Injectable 40 milliGRAM(s) IV Push daily  predniSONE   Tablet 15 milliGRAM(s) Oral <User Schedule>  predniSONE   Tablet 5 milliGRAM(s) Oral at bedtime  simvastatin 20 milliGRAM(s) Oral at bedtime    _____________________________________________________________  STUDY INTERPRETATION    Findings: The background was continuous, spontaneously variable and reactive. No posterior dominant rhythm seen.    Background Slowing:  Diffuse theta and polymorphic delta slowing.    Focal Slowing:   Continuous theta/delta slowing higher amplitude over the right>left hemisphere.    Sleep Background:  Drowsiness was characterized by fragmentation, attenuation, and slowing of the background activity.    Sleep was characterized by the presence of sleep spindles.    Other Non-Epileptiform Findings:  None were present.    Interictal Epileptiform Activity:   Occasional sharp wave discharges over the right posterior temporal (P8) region.   Occasional sharp wave discharges over the right frontotemporal (F4, F8) region.    Events:  Clinical events: None recorded.  Seizures: None recorded.    Activation Procedures:   Hyperventilation was not performed.    Photic stimulation was not performed.     Artifacts:  Intermittent myogenic and movement artifacts were noted.    ECG:  The heart rate on single channel ECG was predominantly between 70-90 BPM.    EEG not recorded from 19:53-04:40  _____________________________________________________________  EEG SUMMARY/CLASSIFICATION    Abnormal EEG in an altered patient.  - Occsaional sharp wave discharges over the right posterior temporal (P8) region.   - Occasional sharp wave discharges over the right frontotemporal (F4, F8) region.  - Continuous theta/delta slowing higher amplitude over the right>left hemisphere.    _____________________________________________________________  EEG IMPRESSION/CLINICAL CORRELATE    Abnormal EEG study.  Potential epileptogenic foci in the right posterior temporal and right frontotemporal regions.  Moderate multifocal cerebral dysfunction, amplitudes higher over the right.   No seizure seen.    _____________________________________________________________    Gail Carolina MD  Epilepsy Fellow     MD PAYTON Austin  Director, Continuous EEG Monitoring Program, [facility]   and Epilepsy Fellowship ,   Department of Neurology, [facility]

## 2020-07-02 LAB
ANION GAP SERPL CALC-SCNC: 12 MMOL/L — SIGNIFICANT CHANGE UP (ref 5–17)
BUN SERPL-MCNC: 22 MG/DL — SIGNIFICANT CHANGE UP (ref 7–23)
CALCIUM SERPL-MCNC: 9.3 MG/DL — SIGNIFICANT CHANGE UP (ref 8.4–10.5)
CHLORIDE SERPL-SCNC: 105 MMOL/L — SIGNIFICANT CHANGE UP (ref 96–108)
CO2 SERPL-SCNC: 24 MMOL/L — SIGNIFICANT CHANGE UP (ref 22–31)
CREAT SERPL-MCNC: 0.7 MG/DL — SIGNIFICANT CHANGE UP (ref 0.5–1.3)
GLUCOSE BLDC GLUCOMTR-MCNC: 144 MG/DL — HIGH (ref 70–99)
GLUCOSE BLDC GLUCOMTR-MCNC: 158 MG/DL — HIGH (ref 70–99)
GLUCOSE BLDC GLUCOMTR-MCNC: 164 MG/DL — HIGH (ref 70–99)
GLUCOSE BLDC GLUCOMTR-MCNC: 224 MG/DL — HIGH (ref 70–99)
GLUCOSE BLDC GLUCOMTR-MCNC: 79 MG/DL — SIGNIFICANT CHANGE UP (ref 70–99)
GLUCOSE SERPL-MCNC: 79 MG/DL — SIGNIFICANT CHANGE UP (ref 70–99)
HCT VFR BLD CALC: 30.3 % — LOW (ref 34.5–45)
HGB BLD-MCNC: 8.9 G/DL — LOW (ref 11.5–15.5)
MAGNESIUM SERPL-MCNC: 2.2 MG/DL — SIGNIFICANT CHANGE UP (ref 1.6–2.6)
MCHC RBC-ENTMCNC: 28.5 PG — SIGNIFICANT CHANGE UP (ref 27–34)
MCHC RBC-ENTMCNC: 29.4 GM/DL — LOW (ref 32–36)
MCV RBC AUTO: 97.1 FL — SIGNIFICANT CHANGE UP (ref 80–100)
NRBC # BLD: 0 /100 WBCS — SIGNIFICANT CHANGE UP (ref 0–0)
PHENYTOIN FREE SERPL-MCNC: 9.2 UG/ML — LOW (ref 10–20)
PHOSPHATE SERPL-MCNC: 3.8 MG/DL — SIGNIFICANT CHANGE UP (ref 2.5–4.5)
PLATELET # BLD AUTO: 337 K/UL — SIGNIFICANT CHANGE UP (ref 150–400)
POTASSIUM SERPL-MCNC: 3.9 MMOL/L — SIGNIFICANT CHANGE UP (ref 3.5–5.3)
POTASSIUM SERPL-SCNC: 3.9 MMOL/L — SIGNIFICANT CHANGE UP (ref 3.5–5.3)
RBC # BLD: 3.12 M/UL — LOW (ref 3.8–5.2)
RBC # FLD: 18.6 % — HIGH (ref 10.3–14.5)
SODIUM SERPL-SCNC: 141 MMOL/L — SIGNIFICANT CHANGE UP (ref 135–145)
WBC # BLD: 13.19 K/UL — HIGH (ref 3.8–10.5)
WBC # FLD AUTO: 13.19 K/UL — HIGH (ref 3.8–10.5)

## 2020-07-02 PROCEDURE — 99291 CRITICAL CARE FIRST HOUR: CPT

## 2020-07-02 PROCEDURE — 71045 X-RAY EXAM CHEST 1 VIEW: CPT | Mod: 26

## 2020-07-02 PROCEDURE — 99292 CRITICAL CARE ADDL 30 MIN: CPT

## 2020-07-02 PROCEDURE — 99231 SBSQ HOSP IP/OBS SF/LOW 25: CPT

## 2020-07-02 RX ORDER — HUMAN INSULIN 100 [IU]/ML
24 INJECTION, SUSPENSION SUBCUTANEOUS
Refills: 0 | Status: DISCONTINUED | OUTPATIENT
Start: 2020-07-02 | End: 2020-07-06

## 2020-07-02 RX ADMIN — SIMVASTATIN 20 MILLIGRAM(S): 20 TABLET, FILM COATED ORAL at 21:09

## 2020-07-02 RX ADMIN — Medication 25 MILLIGRAM(S): at 05:06

## 2020-07-02 RX ADMIN — CHLORHEXIDINE GLUCONATE 1 APPLICATION(S): 213 SOLUTION TOPICAL at 21:10

## 2020-07-02 RX ADMIN — Medication 5 MILLIGRAM(S): at 21:09

## 2020-07-02 RX ADMIN — Medication 15 MILLIGRAM(S): at 05:05

## 2020-07-02 RX ADMIN — LEVETIRACETAM 1000 MILLIGRAM(S): 250 TABLET, FILM COATED ORAL at 05:05

## 2020-07-02 RX ADMIN — Medication 2 MILLIGRAM(S): at 21:09

## 2020-07-02 RX ADMIN — LACOSAMIDE 200 MILLIGRAM(S): 50 TABLET ORAL at 05:05

## 2020-07-02 RX ADMIN — ENOXAPARIN SODIUM 30 MILLIGRAM(S): 100 INJECTION SUBCUTANEOUS at 05:04

## 2020-07-02 RX ADMIN — ROBINUL 0.1 MILLIGRAM(S): 0.2 INJECTION INTRAMUSCULAR; INTRAVENOUS at 11:36

## 2020-07-02 RX ADMIN — Medication 650 MILLIGRAM(S): at 18:32

## 2020-07-02 RX ADMIN — AMLODIPINE BESYLATE 10 MILLIGRAM(S): 2.5 TABLET ORAL at 05:06

## 2020-07-02 RX ADMIN — PANTOPRAZOLE SODIUM 40 MILLIGRAM(S): 20 TABLET, DELAYED RELEASE ORAL at 11:36

## 2020-07-02 RX ADMIN — Medication 2: at 18:05

## 2020-07-02 RX ADMIN — HUMAN INSULIN 6 UNIT(S): 100 INJECTION, SUSPENSION SUBCUTANEOUS at 18:04

## 2020-07-02 RX ADMIN — HUMAN INSULIN 24 UNIT(S): 100 INJECTION, SUSPENSION SUBCUTANEOUS at 11:33

## 2020-07-02 RX ADMIN — LACOSAMIDE 200 MILLIGRAM(S): 50 TABLET ORAL at 17:46

## 2020-07-02 RX ADMIN — Medication 2: at 05:29

## 2020-07-02 RX ADMIN — HUMAN INSULIN 12 UNIT(S): 100 INJECTION, SUSPENSION SUBCUTANEOUS at 23:25

## 2020-07-02 RX ADMIN — GABAPENTIN 300 MILLIGRAM(S): 400 CAPSULE ORAL at 11:37

## 2020-07-02 RX ADMIN — FOSPHENYTOIN 108 MILLIGRAM(S) PE: 50 INJECTION INTRAMUSCULAR; INTRAVENOUS at 05:04

## 2020-07-02 RX ADMIN — LEVETIRACETAM 1000 MILLIGRAM(S): 250 TABLET, FILM COATED ORAL at 17:47

## 2020-07-02 RX ADMIN — CHLORHEXIDINE GLUCONATE 15 MILLILITER(S): 213 SOLUTION TOPICAL at 05:04

## 2020-07-02 RX ADMIN — ENOXAPARIN SODIUM 30 MILLIGRAM(S): 100 INJECTION SUBCUTANEOUS at 17:46

## 2020-07-02 RX ADMIN — CHLORHEXIDINE GLUCONATE 15 MILLILITER(S): 213 SOLUTION TOPICAL at 17:47

## 2020-07-02 RX ADMIN — Medication 150 MICROGRAM(S): at 05:06

## 2020-07-02 RX ADMIN — Medication 25 MILLIGRAM(S): at 17:46

## 2020-07-02 RX ADMIN — Medication 25 MILLIGRAM(S): at 23:25

## 2020-07-02 RX ADMIN — Medication 25 MILLIGRAM(S): at 02:44

## 2020-07-02 RX ADMIN — FOSPHENYTOIN 108 MILLIGRAM(S) PE: 50 INJECTION INTRAMUSCULAR; INTRAVENOUS at 13:01

## 2020-07-02 RX ADMIN — GABAPENTIN 300 MILLIGRAM(S): 400 CAPSULE ORAL at 02:39

## 2020-07-02 RX ADMIN — FOSPHENYTOIN 108 MILLIGRAM(S) PE: 50 INJECTION INTRAMUSCULAR; INTRAVENOUS at 21:09

## 2020-07-02 RX ADMIN — HUMAN INSULIN 50 UNIT(S): 100 INJECTION, SUSPENSION SUBCUTANEOUS at 05:29

## 2020-07-02 RX ADMIN — GABAPENTIN 300 MILLIGRAM(S): 400 CAPSULE ORAL at 17:47

## 2020-07-02 RX ADMIN — Medication 25 MILLIGRAM(S): at 11:37

## 2020-07-02 RX ADMIN — ROBINUL 0.1 MILLIGRAM(S): 0.2 INJECTION INTRAMUSCULAR; INTRAVENOUS at 22:30

## 2020-07-02 RX ADMIN — Medication 4: at 11:35

## 2020-07-02 RX ADMIN — TRAMADOL HYDROCHLORIDE 50 MILLIGRAM(S): 50 TABLET ORAL at 15:03

## 2020-07-02 NOTE — PROGRESS NOTE ADULT - PROBLEM SELECTOR PLAN 1
-test BG Q6h  -Adjust NPH as follows:   6am: c/w NPH 50 units sq   12pm: decrease NPH 24 units sq  6pm: c/w NPH 6 units sq   12am: c/w NPH 12 units sq  hold NPH if Tube feeds off. Can give 50% of dose if BG less than 100mg/dl)  -c/w Humalog moderate correction scale Q6h  -Please notify endocrine if any changes in steroid regimen or TF regimen so we can adjust insulin regimen accordingly.

## 2020-07-02 NOTE — PROGRESS NOTE ADULT - ASSESSMENT
ICH   poor neurologic examination  Continued goals of care conversation, palliative and ethics involved  cont PHT, recheck level in AM

## 2020-07-02 NOTE — EEG REPORT - NS EEG TEXT BOX
[facility]   COMPREHENSIVE EPILEPSY CENTER   REPORT OF LONG-TERM VIDEO EEG     [facility]: [address], [place] [address], Ph#: [phone]  [facility]: [id number] [place] [address], Ph#: [phone]  [facility]: [address] [place] [address], Ph#: [phone]    Patient Name: AFTAB BASS  Age and : 64y ([birth year])  MRN #: [id number]  Location: [address]  Referring Physician: Kimo Thomason    Start Time/Date: 08:00 on 20  End Time/Date: 20:17 on 20  Duration: 12hrs    _____________________________________________________________  STUDY INFORMATION    EEG Recording Technique:  The patient underwent continuous Video-EEG monitoring, using Telemetry System hardware on the XLTek Digital System. EEG and video data were stored on a computer hard drive with important events saved in digital archive files. The material was reviewed by a physician (electroencephalographer / epileptologist) on a daily basis. Moy and seizure detection algorithms were utilized and reviewed. An EEG Technician attended to the patient, and was available throughout daytime work hours.  The epilepsy center neurologist was available in person or on call 24-hours per day.    EEG Placement and Labeling of Electrodes:  The EEG was performed utilizing 20 channel referential EEG connections (coronal over temporal over parasagittal montage) using all standard 10-20 electrode placements with EKG, with additional electrodes placed in the inferior temporal region using the modified 10-10 montage electrode placements for elective admissions, or if deemed necessary. Recording was at a sampling rate of 256 samples per second per channel. Time synchronized digital video recording was done simultaneously with EEG recording. A low light infrared camera was used for low light recording.     _____________________________________________________________  HISTORY    Patient is a 64y old  Female who presents with a chief complaint of ICH, SAH w/ IVH (2020 11:16)    PERTINENT MEDICATION:  MEDICATIONS  (STANDING):  amLODIPine   Tablet 10 milliGRAM(s) Oral daily  doxazosin 2 milliGRAM(s) Oral at bedtime  enoxaparin Injectable 30 milliGRAM(s) SubCutaneous two times a day  fosphenytoin IVPB 200 milliGRAM(s) PE IV Intermittent every 12 hours  insulin lispro (HumaLOG) corrective regimen sliding scale   SubCutaneous every 6 hours  lacosamide Solution 200 milliGRAM(s) Oral two times a day  levETIRAcetam  Solution 1000 milliGRAM(s) Oral two times a day  levothyroxine 150 MICROGram(s) Oral daily  metoprolol tartrate 25 milliGRAM(s) Oral every 6 hours  pantoprazole  Injectable 40 milliGRAM(s) IV Push daily  predniSONE   Tablet 15 milliGRAM(s) Oral <User Schedule>  predniSONE   Tablet 5 milliGRAM(s) Oral at bedtime  simvastatin 20 milliGRAM(s) Oral at bedtime    _____________________________________________________________  STUDY INTERPRETATION    Findings: The background was continuous, spontaneously variable and reactive. No posterior dominant rhythm seen.    Background Slowing:  Diffuse theta and polymorphic delta slowing.    Focal Slowing:   Continuous theta/delta slowing higher amplitude over the right>left hemisphere.    Sleep Background:  Drowsiness was characterized by fragmentation, attenuation, and slowing of the background activity.    Sleep was characterized by the presence of sleep spindles.    Other Non-Epileptiform Findings:  None were present.    Interictal Epileptiform Activity:   Occasional sharp wave discharges over the right posterior temporal (P8) region.   Occasional sharp wave discharges over the right frontotemporal (F4, F8) region.    Events:  Clinical events: None recorded.  Seizures: None recorded.    Artifacts:  Intermittent myogenic and movement artifacts were noted.    ECG:  The heart rate on single channel ECG was predominantly between 70-90 BPM.    EEG not recorded from 19:53-04:40  _____________________________________________________________  EEG SUMMARY/CLASSIFICATION    Abnormal EEG in an altered patient.  - Occsaional sharp wave discharges over the right posterior temporal (P8) region.   - Occasional sharp wave discharges over the right frontotemporal (F4, F8) region.  - Continuous theta/delta slowing higher amplitude over the right>left hemisphere.    _____________________________________________________________  EEG IMPRESSION/CLINICAL CORRELATE    Abnormal EEG study.  Potential epileptogenic foci in the right posterior temporal and right frontotemporal regions.  Moderate multifocal cerebral dysfunction, amplitudes higher over the right.   No seizure seen.    _____________________________________________________________       MD PAYTON Austin  Director, Continuous EEG Monitoring Program, [facility]   and Epilepsy Fellowship ,   Department of Neurology, [facility]

## 2020-07-02 NOTE — PROGRESS NOTE ADULT - ASSESSMENT
65 yo F with uncontrolled T2DM with no known complications, hypothyroidism/ul HTN/ COPD and RA on chronic steroids here with mental status changes associated with shaking found to have a left frontal parenchymal hematoma with mass effect on the left lateral ventricle and intraventricular extension with blood layering in the occipital horns s/p intubation on TF, awaiting family GOC decisions. BG goal (100-200mg/dl). Fluctuating BG values given pauses in tube feeds.

## 2020-07-02 NOTE — PROGRESS NOTE ADULT - SUBJECTIVE AND OBJECTIVE BOX
Diabetes Follow up note:    Chief complaint:     Interval Hx:    Review of Systems:  General:  GI: Tolerating POs. Denies N/V/D/Abd pain  CV: Denies CP/SOB  ENDO: No S&Sx of hypoglycemia  MEDS:  dextrose 50% Injectable 25 Gram(s) IV Push once  insulin lispro (HumaLOG) corrective regimen sliding scale   SubCutaneous every 6 hours  insulin NPH human recombinant 24 Unit(s) SubCutaneous <User Schedule>  insulin NPH human recombinant 12 Unit(s) SubCutaneous <User Schedule>  insulin NPH human recombinant 50 Unit(s) SubCutaneous <User Schedule>  insulin NPH human recombinant 6 Unit(s) SubCutaneous <User Schedule>  levothyroxine 150 MICROGram(s) Oral daily  predniSONE   Tablet 15 milliGRAM(s) Oral <User Schedule>  predniSONE   Tablet 5 milliGRAM(s) Oral at bedtime  simvastatin 20 milliGRAM(s) Oral at bedtime      Allergies    Depakote (Other)  Seroquel (Other (Severe))    Intolerances      PE:  General:  Vital Signs Last 24 Hrs  T(C): 37.2 (02 Jul 2020 04:00), Max: 38 (01 Jul 2020 18:00)  T(F): 99 (02 Jul 2020 04:00), Max: 100.4 (01 Jul 2020 18:00)  HR: 100 (02 Jul 2020 08:26) (92 - 118)  BP: 164/80 (02 Jul 2020 07:10) (100/66 - 164/80)  BP(mean): 113 (02 Jul 2020 07:10) (79 - 113)  RR: 30 (02 Jul 2020 07:20) (15 - 30)  SpO2: 100% (02 Jul 2020 08:26) (97% - 100%)  Abd: Soft, NT,ND,   Extremities: Warm  Neuro: A&O X3    LABS:  POCT Blood Glucose.: 158 mg/dL (07-02-20 @ 05:27)  POCT Blood Glucose.: 79 mg/dL (07-01-20 @ 23:56)  POCT Blood Glucose.: 78 mg/dL (07-01-20 @ 18:49)  POCT Blood Glucose.: 71 mg/dL (07-01-20 @ 17:31)  POCT Blood Glucose.: 166 mg/dL (07-01-20 @ 11:45)  POCT Blood Glucose.: 206 mg/dL (07-01-20 @ 05:08)  POCT Blood Glucose.: 124 mg/dL (06-30-20 @ 23:06)  POCT Blood Glucose.: 148 mg/dL (06-30-20 @ 17:09)  POCT Blood Glucose.: 272 mg/dL (06-30-20 @ 12:03)  POCT Blood Glucose.: 229 mg/dL (06-30-20 @ 05:14)  POCT Blood Glucose.: 109 mg/dL (06-29-20 @ 23:25)  POCT Blood Glucose.: 114 mg/dL (06-29-20 @ 17:16)  POCT Blood Glucose.: 228 mg/dL (06-29-20 @ 11:55)                            8.9    13.19 )-----------( 337      ( 02 Jul 2020 00:43 )             30.3       07-02    141  |  105  |  22  ----------------------------<  79  3.9   |  24  |  0.70    Ca    9.3      02 Jul 2020 00:43  Phos  3.8     07-02  Mg     2.2     07-02    TPro  6.5  /  Alb  3.5  /  TBili  <0.1<L>  /  DBili  x   /  AST  36  /  ALT  24  /  AlkPhos  140<H>  07-01      Thyroid Function Tests:  06-11 @ 13:58 TSH 8.26 FreeT4 -- T3 55 Anti TPO -- Anti Thyroglobulin Ab -- TSI --  06-04 @ 01:58 TSH -- FreeT4 2.7 T3 -- Anti TPO -- Anti Thyroglobulin Ab -- TSI --      A1C with Estimated Average Glucose Result: 7.4 % (05-30-20 @ 04:43)  A1C with Estimated Average Glucose Result: 6.4 % (05-14-20 @ 16:23)  A1C with Estimated Average Glucose Result: 6.5 % (05-13-20 @ 08:24)  Hemoglobin A1C, Whole Blood: 9.0 % (01-28-20 @ 19:30)  Hemoglobin A1C, Whole Blood: 9.2 % (01-27-20 @ 17:36)          Contact number: beeper [phone] or [phone] Diabetes Follow up note:    Chief complaint: T2DM    Interval Hx: BG dropped to 70s yesterday. Went off unit for sometime for MRI. No documented hypoglycemia (6pm/12am NPH doses held). Remains on continuous tube feeds this AM.     Review of Systems:  intubated. unresponsive.     MEDS:  insulin lispro (HumaLOG) corrective regimen sliding scale   SubCutaneous every 6 hours  insulin NPH human recombinant 24 Unit(s) SubCutaneous <User Schedule>  insulin NPH human recombinant 12 Unit(s) SubCutaneous <User Schedule>  insulin NPH human recombinant 50 Unit(s) SubCutaneous <User Schedule>  insulin NPH human recombinant 6 Unit(s) SubCutaneous <User Schedule>  levothyroxine 150 MICROGram(s) Oral daily  predniSONE   Tablet 15 milliGRAM(s) Oral <User Schedule>  predniSONE   Tablet 5 milliGRAM(s) Oral at bedtime  simvastatin 20 milliGRAM(s) Oral at bedtime      Allergies    Depakote (Other)  Seroquel (Other (Severe))      PE:  General: Female lying in bed. Intubated.   Vital Signs Last 24 Hrs  T(C): 37.2 (02 Jul 2020 04:00), Max: 38 (01 Jul 2020 18:00)  T(F): 99 (02 Jul 2020 04:00), Max: 100.4 (01 Jul 2020 18:00)  HR: 100 (02 Jul 2020 08:26) (92 - 118)  BP: 164/80 (02 Jul 2020 07:10) (100/66 - 164/80)  BP(mean): 113 (02 Jul 2020 07:10) (79 - 113)  RR: 30 (02 Jul 2020 07:20) (15 - 30)  SpO2: 100% (02 Jul 2020 08:26) (97% - 100%)  HEENT: ETT in place. On vent.   Abd: Soft, NT,ND, obese  Extremities: Warm  Neuro: unresponsive     LABS:  POCT Blood Glucose.: 158 mg/dL (07-02-20 @ 05:27)  POCT Blood Glucose.: 79 mg/dL (07-01-20 @ 23:56)  POCT Blood Glucose.: 78 mg/dL (07-01-20 @ 18:49)  POCT Blood Glucose.: 71 mg/dL (07-01-20 @ 17:31)  POCT Blood Glucose.: 166 mg/dL (07-01-20 @ 11:45)  POCT Blood Glucose.: 206 mg/dL (07-01-20 @ 05:08)  POCT Blood Glucose.: 124 mg/dL (06-30-20 @ 23:06)  POCT Blood Glucose.: 148 mg/dL (06-30-20 @ 17:09)  POCT Blood Glucose.: 272 mg/dL (06-30-20 @ 12:03)  POCT Blood Glucose.: 229 mg/dL (06-30-20 @ 05:14)  POCT Blood Glucose.: 109 mg/dL (06-29-20 @ 23:25)  POCT Blood Glucose.: 114 mg/dL (06-29-20 @ 17:16)  POCT Blood Glucose.: 228 mg/dL (06-29-20 @ 11:55)                            8.9    13.19 )-----------( 337      ( 02 Jul 2020 00:43 )             30.3       07-02    141  |  105  |  22  ----------------------------<  79  3.9   |  24  |  0.70    Ca    9.3      02 Jul 2020 00:43  Phos  3.8     07-02  Mg     2.2     07-02    TPro  6.5  /  Alb  3.5  /  TBili  <0.1<L>  /  DBili  x   /  AST  36  /  ALT  24  /  AlkPhos  140<H>  07-01      Thyroid Function Tests:  06-11 @ 13:58 TSH 8.26 FreeT4 -- T3 55 Anti TPO -- Anti Thyroglobulin Ab -- TSI --  06-04 @ 01:58 TSH -- FreeT4 2.7 T3 -- Anti TPO -- Anti Thyroglobulin Ab -- TSI --      A1C with Estimated Average Glucose Result: 7.4 % (05-30-20 @ 04:43)  A1C with Estimated Average Glucose Result: 6.4 % (05-14-20 @ 16:23)  A1C with Estimated Average Glucose Result: 6.5 % (05-13-20 @ 08:24)  Hemoglobin A1C, Whole Blood: 9.0 % (01-28-20 @ 19:30)  Hemoglobin A1C, Whole Blood: 9.2 % (01-27-20 @ 17:36)          Contact number: bhavna [phone] or [phone]

## 2020-07-02 NOTE — PROGRESS NOTE ADULT - ASSESSMENT
ASSESSMENT:  Left frontal intracerebral hemorrhage, poor neurologic examination  Continued goals of care conversation, palliative and ethics involved.       NEURO:  Neuro Q4h check  Last head CT showed   CT head 6/30: New increased attenuation involving the bilateral parasagittal and left lateral frontoparietal cortices. patient was never hypoxemic, and no seizures on EEG, will get MRI brain   Seizure management: Keppra and Vimpat, fosphenytoin  7/1 MRI brain:  infarcts in bifrontal, larger in area of prior bleed (left frontal), as well as left occipital  7/2:  dilantin level 9.2 from 7.4 after increasing dose, will repeat level  Pain management prn. 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    Trach and PEG being discussed with family. No decision as of yet   Activity: [] OOB as tolerated [] Bedrest [X] PT [X] OT [] PMNR      PULM:  Intubated since 5/29-  awaiting family decision on goals of care-, unclear if family will elect to trach yet.  H/O of COPD   CPAP as tolerated 5/5 on 7/1  CXR 7/1 re ETT  SATS >96 %    will attempt to extubate as feasible once DNI agreed to by proxy.       CV: Intermittently tachy   Keep -160  HTN: Continue norvasc   Sinus tachycardia, metoprolol  25 q6h  Statin for HLD        RENAL:  IVL. Monitor Is/Os  On cardura for urinary retention  Straight cath Q6      GI:  Diet: On Glucerna OGT   Continue Banana flakes and metamucil, LBM 7/1  GI prophylaxis [] not indicated [x] PPI. Protonix is home med.      ENDO: S/P hyperglycemia - IR DM  Endocrine following for adjustment of NPH;   Continue PO synthroid  Continue prednisone 15mg, 5 mg at bedtime (was on it at home for RA). Likely etiology of bruising  Goal euglycemia (-180)      HEME/ONC:   On SQL for DVT ppx- 30mg SQ q12.  VTE prophylaxis: [X] SCDs [X] chemoprophylaxis [X] high risk of DVT/PE on admission due to: bed bound at baseline  LE doppler 5/30- No DVT  Repeat LE dopplers    ID:  Afebrile   Ertapenem for ESBL found growing in urine culture on 6/4 - ended on  - 6/14      MISC: Awaiting family decision regarding trach/PEG vs palliation. Met with family/ethics and palliative care several times. Meeting with Lesly 7/1 (requested by family) today to discuss goals of care.       Access: Extended dwell catheter PIV placed R forearm.    CODE STATUS:  [X] Full Code     DISPOSITION:  [X] ICU    [X] Patient is at high risk of neurologic deterioration/death due to: resp failure, seizures, hemorrhage.     35 critical care time ASSESSMENT:  Left frontal intracerebral hemorrhage, poor neurologic examination  Continued goals of care conversation, palliative and ethics involved.       NEURO:  Neuro Q4h check  CT head 6/30: New increased attenuation involving the bilateral parasagittal and left lateral frontoparietal cortices. patient was never hypoxemic, and no seizures on EEG  Seizure management: Keppra and Vimpat, fosphenytoin  7/1 MRI brain:  infarcts in bifrontal, larger in area of prior bleed (left frontal), as well as left occipital  7/2:  dilantin level 9.2 from 7.4 after increasing dose, will repeat level in few days  EEG epileptiform discharges d/c EEG   Pain management prn. 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    Trach and PEG being discussed with family. No decision as of yet   Activity: [] OOB as tolerated [] Bedrest [X] PT [X] OT [] PMNR      PULM:  Intubated since 5/29-  awaiting family decision on goals of care-, unclear if family will elect to trach yet.  H/O of COPD   CPAP as tolerated 5/5 on 7/1  CXR 7/1 re ETT  SATS >96 %    will attempt to extubate as feasible once DNI agreed to by proxy.       CV: Intermittently tachy   Keep -160  HTN: Continue norvasc   Sinus tachycardia, metoprolol  25 q6h  Statin for HLD        RENAL:  IVL. Monitor Is/Os  On cardura for urinary retention  Straight cath Q6      GI:  Diet: On Glucerna OGT   Continue Banana flakes and metamucil, LBM 7/1  GI prophylaxis [] not indicated [x] PPI. Protonix is home med.      ENDO: S/P hyperglycemia - IR DM  Endocrine following for adjustment of NPH;   Continue PO synthroid  Continue prednisone 15mg, 5 mg at bedtime (was on it at home for RA). Likely etiology of bruising  Goal euglycemia (-180)  -Adjust NPH as follows: per endo   6am: c/w NPH 50 units sq   12pm: decrease NPH 24 units sq  6pm: c/w NPH 6 units sq   12am: c/w NPH 12 units sq    HEME/ONC:   On SQL for DVT ppx- 30mg SQ q12.  VTE prophylaxis: [X] SCDs [X] chemoprophylaxis [X] high risk of DVT/PE on admission due to: bed bound at baseline  LE doppler 5/30- No DVT  Repeat LE dopplers    ID:  Afebrile   Ertapenem for ESBL found growing in urine culture on 6/4 - ended on  - 6/14      MISC: Awaiting family decision regarding trach/PEG vs palliation. Met with family/ethics and palliative care several times. Meeting with Lesly 7/1 (requested by family) today to discuss goals of care.       Access: Extended dwell catheter PIV placed R forearm.    CODE STATUS:  [X] Full Code     DISPOSITION:  [X] ICU    [X] Patient is at high risk of neurologic deterioration/death due to: resp failure, seizures, hemorrhage.     35 critical care time

## 2020-07-02 NOTE — PROGRESS NOTE ADULT - SUBJECTIVE AND OBJECTIVE BOX
SUMMARY: 65 yo F with past medical hx of hypothyroidism, HTN, IDDM, COPD/CHRIS on home O2, RA (on Prednisone), SBO s/p ex lap with lysis of adhesions c/b evisceration,  recent hospitalization in 3/2020 with encephalopathy, significantly hypothyroid (, without myxedema coma), small (stable) right sided SDH, UTI, catatonic reaction to VPA (now resolved) and DKA and psychiatric issues, 5/2020 hospitalization for GIB, Emphysematous cystitis, endometritis, Sigmoid colitis, morbid obesity, functional quadriplegia presented on 5/30 with AMS and was found to have L frontal IPH. Per report, patient seized at home prior to presentation, but has not noted to be seizing during the hospitalization. Continued Goals of care discussion with family; palliative and ethics involved    HCP: Norris Rush, Son [phone]; Daughter, Heavenly Schmidt [phone]; son asks that only him or his sister, Heavenly be contacted for patient updates/information as they are both proxy's for patient (29 May 2020 23:41)    ADMISSION SCORES:  GCS: 5 [E2 VNT M3]  ICH: 2    HOSP COURSE:   5/29: DDAVP  6/9: Found to have LUE spasmodic movements. Aborted after holding LUE.   6/9: [facility] meeting with palliative care, ethics.    OVERNIGHT EVENTS: none reported      ICU Vital Signs Last 24 Hrs  T(C): 37.2 (02 Jul 2020 04:00), Max: 38 (01 Jul 2020 18:00)  T(F): 99 (02 Jul 2020 04:00), Max: 100.4 (01 Jul 2020 18:00)  HR: 95 (02 Jul 2020 05:59) (92 - 118)  BP: 155/83 (02 Jul 2020 05:05) (100/66 - 155/83)  BP(mean): 111 (02 Jul 2020 05:05) (79 - 111)  ABP: --  ABP(mean): --  RR: 17 (02 Jul 2020 00:00) (17 - 28)  SpO2: 100% (02 Jul 2020 05:59) (97% - 100%)      Mode: AC/ CMV (Assist Control/ Continuous Mandatory Ventilation), RR (machine): 14, TV (machine): 450, FiO2: 30, PEEP: 5, ITime: 1, MAP: 11, PIP: 19    MEDICATIONS  (STANDING):  amLODIPine   Tablet 10 milliGRAM(s) Oral daily  chlorhexidine 0.12% Liquid 15 milliLiter(s) Oral Mucosa every 12 hours  chlorhexidine 4% Liquid 1 Application(s) Topical <User Schedule>  dextrose 5%. 1000 milliLiter(s) (50 mL/Hr) IV Continuous <Continuous>  dextrose 50% Injectable 25 Gram(s) IV Push once  doxazosin 2 milliGRAM(s) Oral at bedtime  enoxaparin Injectable 30 milliGRAM(s) SubCutaneous two times a day  fosphenytoin IVPB 200 milliGRAM(s) PE IV Intermittent every 8 hours  gabapentin 300 milliGRAM(s) Oral three times a day  insulin lispro (HumaLOG) corrective regimen sliding scale   SubCutaneous every 6 hours  insulin NPH human recombinant 12 Unit(s) SubCutaneous <User Schedule>  insulin NPH human recombinant 28 Unit(s) SubCutaneous <User Schedule>  insulin NPH human recombinant 50 Unit(s) SubCutaneous <User Schedule>  insulin NPH human recombinant 6 Unit(s) SubCutaneous <User Schedule>  lacosamide Solution 200 milliGRAM(s) Oral two times a day  levETIRAcetam  Solution 1000 milliGRAM(s) Oral two times a day  levothyroxine 150 MICROGram(s) Oral daily  metoprolol tartrate 25 milliGRAM(s) Oral every 6 hours  pantoprazole  Injectable 40 milliGRAM(s) IV Push daily  predniSONE   Tablet 15 milliGRAM(s) Oral <User Schedule>  predniSONE   Tablet 5 milliGRAM(s) Oral at bedtime  simvastatin 20 milliGRAM(s) Oral at bedtime    MEDICATIONS  (PRN):  acetaminophen    Suspension .. 650 milliGRAM(s) Oral every 6 hours PRN Temp greater or equal to 38C (100.4F), Mild Pain (1 - 3)  glycopyrrolate Injectable 0.1 milliGRAM(s) IV Push three times a day PRN secretions  sodium chloride 0.9% lock flush 10 milliLiter(s) IV Push every 1 hour PRN Pre/post blood products, medications, blood draw, and to maintain line patency  traMADol 50 milliGRAM(s) Oral every 4 hours PRN tachycardia, vent dyssynchrony                                      8.9    13.19 )-----------( 337      ( 02 Jul 2020 00:43 )             30.3   07-02    141  |  105  |  22  ----------------------------<  79  3.9   |  24  |  0.70    Ca    9.3      02 Jul 2020 00:43  Phos  3.8     07-02  Mg     2.2     07-02    TPro  6.5  /  Alb  3.5  /  TBili  <0.1<L>  /  DBili  x   /  AST  36  /  ALT  24  /  AlkPhos  140<H>  07-01    CT head 6/30:  IMPRESSION:    Previously seen large left frontal parenchymal hemorrhage has undergone normal temporal evolution, demonstrating overall decreased size, density, and mass effect.  Similar small hemorrhage layering in the occipital horns.  The ventricles, previously slitlike, have reexpanded. No midline shift or effacement of the basal cisterns.  Apparent cytotoxic edema in the left parasagittal frontoparietal region (2-24). Residual vasogenic edema in the left frontoparietal region.  New increased attenuation involving the bilateral parasagittal and left lateral frontoparietal cortices. This could be related to recent seizure or hypoperfusion/hypoxia.  Dr. Eid discussed these findings with physician's assistant Jesús Kowalski on 6/30/2020 9:40 AM with read back.      PHYSICAL EXAM:  Neurological: Intubated, EO to noxious stim, does not track, dysconjugate gaze, pupils 3mm reactive, +cough/gag, does not follow commands, upper extremities tremors when stimulated, LEs TF to noxious stim.    Cardiovascular: +S1, S2, borderline tachycardic.   Pulmonary: No respiratory distress  Gastrointestinal: soft, non-distended, non-tender  abd: soft, non-tender, nl bs ,+ skin lesion  Extremities: Warm, dry  + skin tear on buttocks      MPRESSION:    Previously seen large left frontal parenchymal hemorrhage has undergone normal temporal evolution, demonstrating overall decreased size, density, and mass effect.    Similar small hemorrhage layering in the occipital horns.    The ventricles, previously slitlike, have reexpanded. No midline shift or effacement of the basal cisterns.    Apparent cytotoxic edema in the left parasagittal frontoparietal region (2-24). Residual vasogenic edema in the left frontoparietal region.    New increased attenuation involving the bilateral parasagittal and left lateral frontoparietal cortices. This could be related to recent seizure or hypoperfusion/hypoxia. SUMMARY: 63 yo F with past medical hx of hypothyroidism, HTN, IDDM, COPD/CHRIS on home O2, RA (on Prednisone), SBO s/p ex lap with lysis of adhesions c/b evisceration,  recent hospitalization in 3/2020 with encephalopathy, significantly hypothyroid (, without myxedema coma), small (stable) right sided SDH, UTI, catatonic reaction to VPA (now resolved) and DKA and psychiatric issues, 5/2020 hospitalization for GIB, Emphysematous cystitis, endometritis, Sigmoid colitis, morbid obesity, functional quadriplegia presented on 5/30 with AMS and was found to have L frontal IPH. Per report, patient seized at home prior to presentation, but has not noted to be seizing during the hospitalization. Continued Goals of care discussion with family; palliative and ethics involved    HCP: Norris Rush, Son [phone]; Daughter, Heavenly Schmidt [phone]; son asks that only him or his sister, Heavenly be contacted for patient updates/information as they are both proxy's for patient (29 May 2020 23:41)    ADMISSION SCORES:  GCS: 5 [E2 VNT M3]  ICH: 2    HOSP COURSE:   5/29: DDAVP  6/9: Found to have LUE spasmodic movements. Aborted after holding LUE.   6/9: [facility] meeting with palliative care, ethics.    OVERNIGHT EVENTS: remains with poor neuro exam       ICU Vital Signs Last 24 Hrs  T(C): 37.2 (02 Jul 2020 04:00), Max: 38 (01 Jul 2020 18:00)  T(F): 99 (02 Jul 2020 04:00), Max: 100.4 (01 Jul 2020 18:00)  HR: 95 (02 Jul 2020 05:59) (92 - 118)  BP: 155/83 (02 Jul 2020 05:05) (100/66 - 155/83)  BP(mean): 111 (02 Jul 2020 05:05) (79 - 111)  ABP: --  ABP(mean): --  RR: 17 (02 Jul 2020 00:00) (17 - 28)  SpO2: 100% (02 Jul 2020 05:59) (97% - 100%)      Mode: AC/ CMV (Assist Control/ Continuous Mandatory Ventilation), RR (machine): 14, TV (machine): 450, FiO2: 30, PEEP: 5, ITime: 1, MAP: 11, PIP: 19    MEDICATIONS  (STANDING):  amLODIPine   Tablet 10 milliGRAM(s) Oral daily  chlorhexidine 0.12% Liquid 15 milliLiter(s) Oral Mucosa every 12 hours  chlorhexidine 4% Liquid 1 Application(s) Topical <User Schedule>  dextrose 5%. 1000 milliLiter(s) (50 mL/Hr) IV Continuous <Continuous>  dextrose 50% Injectable 25 Gram(s) IV Push once  doxazosin 2 milliGRAM(s) Oral at bedtime  enoxaparin Injectable 30 milliGRAM(s) SubCutaneous two times a day  fosphenytoin IVPB 200 milliGRAM(s) PE IV Intermittent every 8 hours  gabapentin 300 milliGRAM(s) Oral three times a day  insulin lispro (HumaLOG) corrective regimen sliding scale   SubCutaneous every 6 hours  insulin NPH human recombinant 12 Unit(s) SubCutaneous <User Schedule>  insulin NPH human recombinant 28 Unit(s) SubCutaneous <User Schedule>  insulin NPH human recombinant 50 Unit(s) SubCutaneous <User Schedule>  insulin NPH human recombinant 6 Unit(s) SubCutaneous <User Schedule>  lacosamide Solution 200 milliGRAM(s) Oral two times a day  levETIRAcetam  Solution 1000 milliGRAM(s) Oral two times a day  levothyroxine 150 MICROGram(s) Oral daily  metoprolol tartrate 25 milliGRAM(s) Oral every 6 hours  pantoprazole  Injectable 40 milliGRAM(s) IV Push daily  predniSONE   Tablet 15 milliGRAM(s) Oral <User Schedule>  predniSONE   Tablet 5 milliGRAM(s) Oral at bedtime  simvastatin 20 milliGRAM(s) Oral at bedtime    MEDICATIONS  (PRN):  acetaminophen    Suspension .. 650 milliGRAM(s) Oral every 6 hours PRN Temp greater or equal to 38C (100.4F), Mild Pain (1 - 3)  glycopyrrolate Injectable 0.1 milliGRAM(s) IV Push three times a day PRN secretions  sodium chloride 0.9% lock flush 10 milliLiter(s) IV Push every 1 hour PRN Pre/post blood products, medications, blood draw, and to maintain line patency  traMADol 50 milliGRAM(s) Oral every 4 hours PRN tachycardia, vent dyssynchrony                                      8.9    13.19 )-----------( 337      ( 02 Jul 2020 00:43 )             30.3   07-02    141  |  105  |  22  ----------------------------<  79  3.9   |  24  |  0.70    Ca    9.3      02 Jul 2020 00:43  Phos  3.8     07-02  Mg     2.2     07-02    TPro  6.5  /  Alb  3.5  /  TBili  <0.1<L>  /  DBili  x   /  AST  36  /  ALT  24  /  AlkPhos  140<H>  07-01    CT head 6/30:  IMPRESSION:    Previously seen large left frontal parenchymal hemorrhage has undergone normal temporal evolution, demonstrating overall decreased size, density, and mass effect.  Similar small hemorrhage layering in the occipital horns.  The ventricles, previously slitlike, have reexpanded. No midline shift or effacement of the basal cisterns.  Apparent cytotoxic edema in the left parasagittal frontoparietal region (2-24). Residual vasogenic edema in the left frontoparietal region.  New increased attenuation involving the bilateral parasagittal and left lateral frontoparietal cortices. This could be related to recent seizure or hypoperfusion/hypoxia.  Dr. Eid discussed these findings with physician's assistant Jesús Kowalski on 6/30/2020 9:40 AM with read back.      PHYSICAL EXAM:  Neurological: Intubated, EO to noxious stim, does not track, dysconjugate gaze, pupils 3mm reactive, +cough/gag, does not follow commands, upper extremities tremors when stimulated, LEs TF to noxious stim.    Cardiovascular: +S1, S2, borderline tachycardic.   Pulmonary: No respiratory distress  Gastrointestinal: soft, non-distended, non-tender  abd: soft, non-tender, nl bs ,+ skin lesion  Extremities: Warm, dry  + skin tear on buttocks      MPRESSION:    Previously seen large left frontal parenchymal hemorrhage has undergone normal temporal evolution, demonstrating overall decreased size, density, and mass effect.    Similar small hemorrhage layering in the occipital horns.    The ventricles, previously slitlike, have reexpanded. No midline shift or effacement of the basal cisterns.    Apparent cytotoxic edema in the left parasagittal frontoparietal region (2-24). Residual vasogenic edema in the left frontoparietal region.    New increased attenuation involving the bilateral parasagittal and left lateral frontoparietal cortices. This could be related to recent seizure or hypoperfusion/hypoxia.    MRI brain   IMPRESSION:    Left frontal lobe hematoma without significant change from the most recent CT scan.    Areas of abnormal signal with gyriform T1 hyperintensity involving the medial frontal lobes and left sylvian fissure region likely the sequelae of subacute to chronic infarction possibly with associated petechial hemorrhage or hemosiderin deposition.    Increased ventricular size when compared with the CT scan from 6/2/2020.

## 2020-07-02 NOTE — PROGRESS NOTE ADULT - SUBJECTIVE AND OBJECTIVE BOX
EEG showed no sz    labs/meds/imaging reviewed  MRI brain- L frontal ICH    Intubated, off sedation  no eye opening, no tracking, no following commands  PERRL 3mm   +corneals/cough/gag  bilat upper ext tremors with stim L>R  bilat LE TF

## 2020-07-02 NOTE — PROGRESS NOTE ADULT - PROBLEM SELECTOR PLAN 2
-Levothyroxine 150mcg per PEG daily  -Hold TF 1 hour prior and after levothyroxine given. Do not give with other medications.  -discussed with RN  pager: [phone]   cell: [phone]  office: [phone]    -I spent a total time of 17 mins with the patient at bedside/on unit of which more than 50% of time was spent on counseling/coordination of care.

## 2020-07-03 LAB
ANION GAP SERPL CALC-SCNC: 14 MMOL/L — SIGNIFICANT CHANGE UP (ref 5–17)
BUN SERPL-MCNC: 20 MG/DL — SIGNIFICANT CHANGE UP (ref 7–23)
CALCIUM SERPL-MCNC: 9.2 MG/DL — SIGNIFICANT CHANGE UP (ref 8.4–10.5)
CHLORIDE SERPL-SCNC: 105 MMOL/L — SIGNIFICANT CHANGE UP (ref 96–108)
CO2 SERPL-SCNC: 21 MMOL/L — LOW (ref 22–31)
CREAT SERPL-MCNC: 0.65 MG/DL — SIGNIFICANT CHANGE UP (ref 0.5–1.3)
GAS PNL BLDA: SIGNIFICANT CHANGE UP
GLUCOSE BLDC GLUCOMTR-MCNC: 105 MG/DL — HIGH (ref 70–99)
GLUCOSE BLDC GLUCOMTR-MCNC: 115 MG/DL — HIGH (ref 70–99)
GLUCOSE BLDC GLUCOMTR-MCNC: 163 MG/DL — HIGH (ref 70–99)
GLUCOSE BLDC GLUCOMTR-MCNC: 92 MG/DL — SIGNIFICANT CHANGE UP (ref 70–99)
GLUCOSE SERPL-MCNC: 180 MG/DL — HIGH (ref 70–99)
HCT VFR BLD CALC: 30.2 % — LOW (ref 34.5–45)
HGB BLD-MCNC: 9.1 G/DL — LOW (ref 11.5–15.5)
MAGNESIUM SERPL-MCNC: 2.2 MG/DL — SIGNIFICANT CHANGE UP (ref 1.6–2.6)
MCHC RBC-ENTMCNC: 29.1 PG — SIGNIFICANT CHANGE UP (ref 27–34)
MCHC RBC-ENTMCNC: 30.1 GM/DL — LOW (ref 32–36)
MCV RBC AUTO: 96.5 FL — SIGNIFICANT CHANGE UP (ref 80–100)
NRBC # BLD: 0 /100 WBCS — SIGNIFICANT CHANGE UP (ref 0–0)
PHENYTOIN FREE SERPL-MCNC: 10 UG/ML — SIGNIFICANT CHANGE UP (ref 10–20)
PHOSPHATE SERPL-MCNC: 3.8 MG/DL — SIGNIFICANT CHANGE UP (ref 2.5–4.5)
PLATELET # BLD AUTO: 375 K/UL — SIGNIFICANT CHANGE UP (ref 150–400)
POTASSIUM SERPL-MCNC: 4.3 MMOL/L — SIGNIFICANT CHANGE UP (ref 3.5–5.3)
POTASSIUM SERPL-SCNC: 4.3 MMOL/L — SIGNIFICANT CHANGE UP (ref 3.5–5.3)
RBC # BLD: 3.13 M/UL — LOW (ref 3.8–5.2)
RBC # FLD: 18.5 % — HIGH (ref 10.3–14.5)
SODIUM SERPL-SCNC: 140 MMOL/L — SIGNIFICANT CHANGE UP (ref 135–145)
WBC # BLD: 12.41 K/UL — HIGH (ref 3.8–10.5)
WBC # FLD AUTO: 12.41 K/UL — HIGH (ref 3.8–10.5)

## 2020-07-03 PROCEDURE — 71045 X-RAY EXAM CHEST 1 VIEW: CPT | Mod: 26

## 2020-07-03 PROCEDURE — 99291 CRITICAL CARE FIRST HOUR: CPT

## 2020-07-03 PROCEDURE — 99292 CRITICAL CARE ADDL 30 MIN: CPT

## 2020-07-03 RX ORDER — MODAFINIL 200 MG/1
100 TABLET ORAL DAILY
Refills: 0 | Status: DISCONTINUED | OUTPATIENT
Start: 2020-07-03 | End: 2020-07-05

## 2020-07-03 RX ORDER — FOSPHENYTOIN 50 MG/ML
250 INJECTION INTRAMUSCULAR; INTRAVENOUS EVERY 8 HOURS
Refills: 0 | Status: DISCONTINUED | OUTPATIENT
Start: 2020-07-03 | End: 2020-07-05

## 2020-07-03 RX ORDER — MODAFINIL 200 MG/1
200 TABLET ORAL DAILY
Refills: 0 | Status: DISCONTINUED | OUTPATIENT
Start: 2020-07-06 | End: 2020-07-13

## 2020-07-03 RX ADMIN — ROBINUL 0.1 MILLIGRAM(S): 0.2 INJECTION INTRAMUSCULAR; INTRAVENOUS at 16:15

## 2020-07-03 RX ADMIN — HUMAN INSULIN 24 UNIT(S): 100 INJECTION, SUSPENSION SUBCUTANEOUS at 13:30

## 2020-07-03 RX ADMIN — CHLORHEXIDINE GLUCONATE 15 MILLILITER(S): 213 SOLUTION TOPICAL at 18:30

## 2020-07-03 RX ADMIN — Medication 2: at 05:54

## 2020-07-03 RX ADMIN — GABAPENTIN 300 MILLIGRAM(S): 400 CAPSULE ORAL at 10:15

## 2020-07-03 RX ADMIN — Medication 2 MILLIGRAM(S): at 23:32

## 2020-07-03 RX ADMIN — HUMAN INSULIN 12 UNIT(S): 100 INJECTION, SUSPENSION SUBCUTANEOUS at 23:45

## 2020-07-03 RX ADMIN — LEVETIRACETAM 1000 MILLIGRAM(S): 250 TABLET, FILM COATED ORAL at 05:32

## 2020-07-03 RX ADMIN — GABAPENTIN 300 MILLIGRAM(S): 400 CAPSULE ORAL at 18:30

## 2020-07-03 RX ADMIN — Medication 15 MILLIGRAM(S): at 05:33

## 2020-07-03 RX ADMIN — CHLORHEXIDINE GLUCONATE 15 MILLILITER(S): 213 SOLUTION TOPICAL at 05:33

## 2020-07-03 RX ADMIN — ROBINUL 0.1 MILLIGRAM(S): 0.2 INJECTION INTRAMUSCULAR; INTRAVENOUS at 05:32

## 2020-07-03 RX ADMIN — PANTOPRAZOLE SODIUM 40 MILLIGRAM(S): 20 TABLET, DELAYED RELEASE ORAL at 11:09

## 2020-07-03 RX ADMIN — ENOXAPARIN SODIUM 30 MILLIGRAM(S): 100 INJECTION SUBCUTANEOUS at 18:30

## 2020-07-03 RX ADMIN — LACOSAMIDE 200 MILLIGRAM(S): 50 TABLET ORAL at 05:31

## 2020-07-03 RX ADMIN — Medication 150 MICROGRAM(S): at 05:31

## 2020-07-03 RX ADMIN — SIMVASTATIN 20 MILLIGRAM(S): 20 TABLET, FILM COATED ORAL at 23:32

## 2020-07-03 RX ADMIN — FOSPHENYTOIN 108 MILLIGRAM(S) PE: 50 INJECTION INTRAMUSCULAR; INTRAVENOUS at 13:31

## 2020-07-03 RX ADMIN — GABAPENTIN 300 MILLIGRAM(S): 400 CAPSULE ORAL at 01:23

## 2020-07-03 RX ADMIN — Medication 25 MILLIGRAM(S): at 11:09

## 2020-07-03 RX ADMIN — LEVETIRACETAM 1000 MILLIGRAM(S): 250 TABLET, FILM COATED ORAL at 18:30

## 2020-07-03 RX ADMIN — FOSPHENYTOIN 108 MILLIGRAM(S) PE: 50 INJECTION INTRAMUSCULAR; INTRAVENOUS at 22:00

## 2020-07-03 RX ADMIN — CHLORHEXIDINE GLUCONATE 1 APPLICATION(S): 213 SOLUTION TOPICAL at 21:48

## 2020-07-03 RX ADMIN — MODAFINIL 100 MILLIGRAM(S): 200 TABLET ORAL at 10:20

## 2020-07-03 RX ADMIN — Medication 25 MILLIGRAM(S): at 23:32

## 2020-07-03 RX ADMIN — Medication 25 MILLIGRAM(S): at 18:30

## 2020-07-03 RX ADMIN — HUMAN INSULIN 50 UNIT(S): 100 INJECTION, SUSPENSION SUBCUTANEOUS at 05:54

## 2020-07-03 RX ADMIN — LACOSAMIDE 200 MILLIGRAM(S): 50 TABLET ORAL at 18:34

## 2020-07-03 RX ADMIN — HUMAN INSULIN 6 UNIT(S): 100 INJECTION, SUSPENSION SUBCUTANEOUS at 18:34

## 2020-07-03 RX ADMIN — ENOXAPARIN SODIUM 30 MILLIGRAM(S): 100 INJECTION SUBCUTANEOUS at 05:32

## 2020-07-03 RX ADMIN — FOSPHENYTOIN 108 MILLIGRAM(S) PE: 50 INJECTION INTRAMUSCULAR; INTRAVENOUS at 05:31

## 2020-07-03 NOTE — CHART NOTE - NSCHARTNOTEFT_GEN_A_CORE
Called patient's daughter, Loren.   Updated her on mother's condition and all questions were answered regarding MRI and VEEG findings, current respiratory status.   Readdressed the length of time that patient has been on the ventilator and the harm that this poses to the patient.    Of note, she expressed that the last time pt was in the ICU, she was kept intubated for about 30 days.   Daughter verbalized that she understands the risk and will discuss with the remainder of the family including her brother, Norris.   Will continue to try to optimize for possible extubation. On CPAP and started glycopyrrolate for copious secretions.   They would like to meet again via telephone on 7/6/2020 to discuss the possibility of extubating without re-intubating.     Following phone conversation, son, Norris visited at bedside.   Spoke with him in person and conveyed the same information as above.

## 2020-07-03 NOTE — PROGRESS NOTE ADULT - SUBJECTIVE AND OBJECTIVE BOX
labs/meds/imaging reviewed  MRI brain- L frontal ICH    Intubated, off sedation  eye opening to nox stim, no tracking, no following commands  PERRL 3mm, EOMI   +corneals/cough/gag  bilat upper ext tremors with stim L>R  bilat LE TF

## 2020-07-03 NOTE — PROGRESS NOTE ADULT - SUBJECTIVE AND OBJECTIVE BOX
SUMMARY: 63 yo F with past medical hx of hypothyroidism, HTN, IDDM, COPD/CHRIS on home O2, RA (on Prednisone), SBO s/p ex lap with lysis of adhesions c/b evisceration,  recent hospitalization in 3/2020 with encephalopathy, significantly hypothyroid (, without myxedema coma), small (stable) right sided SDH, UTI, catatonic reaction to VPA (now resolved) and DKA and psychiatric issues, 5/2020 hospitalization for GIB, Emphysematous cystitis, endometritis, Sigmoid colitis, morbid obesity, functional quadriplegia presented on 5/30 with AMS and was found to have L frontal IPH. Per report, patient seized at home prior to presentation, but has not noted to be seizing during the hospitalization. Continued Goals of care discussion with family; palliative and ethics involved    HCP: Norris Rush, Son [phone]; Daughter, Heavenly Schmidt [phone]; son asks that only him or his sister, Heavenly be contacted for patient updates/information as they are both proxy's for patient (29 May 2020 23:41)    ADMISSION SCORES:  GCS: 5 [E2 VNT M3]  ICH: 2    HOSP COURSE:   5/29: DDAVP  6/9: Found to have LUE spasmodic movements. Aborted after holding LUE.   6/9: [facility] meeting with palliative care, ethics.    OVERNIGHT EVENTS: remains with poor neuro exam       ICU Vital Signs Last 24 Hrs  T(C): 36.9 (03 Jul 2020 04:00), Max: 38.1 (02 Jul 2020 18:00)  T(F): 98.4 (03 Jul 2020 04:00), Max: 100.6 (02 Jul 2020 18:00)  HR: 100 (03 Jul 2020 04:55) (91 - 120)  BP: 108/74 (03 Jul 2020 04:00) (108/74 - 168/79)  BP(mean): 87 (03 Jul 2020 04:00) (87 - 114)  ABP: --  ABP(mean): --  RR: 19 (03 Jul 2020 04:00) (9 - 32)  SpO2: 100% (03 Jul 2020 04:55) (81% - 100%)      Mode: AC/ CMV (Assist Control/ Continuous Mandatory Ventilation), RR (machine): 14, TV (machine): 450, FiO2: 30, PEEP: 5, ITime: 1, MAP: 11, PIP: 19    MEDICATIONS  (STANDING):  amLODIPine   Tablet 10 milliGRAM(s) Oral daily  chlorhexidine 0.12% Liquid 15 milliLiter(s) Oral Mucosa every 12 hours  chlorhexidine 4% Liquid 1 Application(s) Topical <User Schedule>  dextrose 5%. 1000 milliLiter(s) (50 mL/Hr) IV Continuous <Continuous>  dextrose 50% Injectable 25 Gram(s) IV Push once  doxazosin 2 milliGRAM(s) Oral at bedtime  enoxaparin Injectable 30 milliGRAM(s) SubCutaneous two times a day  fosphenytoin IVPB 200 milliGRAM(s) PE IV Intermittent every 8 hours  gabapentin 300 milliGRAM(s) Oral three times a day  insulin lispro (HumaLOG) corrective regimen sliding scale   SubCutaneous every 6 hours  insulin NPH human recombinant 24 Unit(s) SubCutaneous <User Schedule>  insulin NPH human recombinant 12 Unit(s) SubCutaneous <User Schedule>  insulin NPH human recombinant 50 Unit(s) SubCutaneous <User Schedule>  insulin NPH human recombinant 6 Unit(s) SubCutaneous <User Schedule>  lacosamide Solution 200 milliGRAM(s) Oral two times a day  levETIRAcetam  Solution 1000 milliGRAM(s) Oral two times a day  levothyroxine 150 MICROGram(s) Oral daily  metoprolol tartrate 25 milliGRAM(s) Oral every 6 hours  pantoprazole  Injectable 40 milliGRAM(s) IV Push daily  predniSONE   Tablet 15 milliGRAM(s) Oral <User Schedule>  predniSONE   Tablet 5 milliGRAM(s) Oral at bedtime  simvastatin 20 milliGRAM(s) Oral at bedtime    MEDICATIONS  (PRN):  acetaminophen    Suspension .. 650 milliGRAM(s) Oral every 6 hours PRN Temp greater or equal to 38C (100.4F), Mild Pain (1 - 3)  glycopyrrolate Injectable 0.1 milliGRAM(s) IV Push three times a day PRN secretions  sodium chloride 0.9% lock flush 10 milliLiter(s) IV Push every 1 hour PRN Pre/post blood products, medications, blood draw, and to maintain line patency  traMADol 50 milliGRAM(s) Oral every 4 hours PRN tachycardia, vent dyssynchrony                                  9.1    12.41 )-----------( 375      ( 03 Jul 2020 01:20 )             30.2   07-03    140  |  105  |  20  ----------------------------<  180<H>  4.3   |  21<L>  |  0.65    Ca    9.2      03 Jul 2020 01:20  Phos  3.8     07-03  Mg     2.2     07-03      CT head 6/30:  IMPRESSION:    Previously seen large left frontal parenchymal hemorrhage has undergone normal temporal evolution, demonstrating overall decreased size, density, and mass effect.  Similar small hemorrhage layering in the occipital horns.  The ventricles, previously slitlike, have reexpanded. No midline shift or effacement of the basal cisterns.  Apparent cytotoxic edema in the left parasagittal frontoparietal region (2-24). Residual vasogenic edema in the left frontoparietal region.  New increased attenuation involving the bilateral parasagittal and left lateral frontoparietal cortices. This could be related to recent seizure or hypoperfusion/hypoxia.  Dr. iEd discussed these findings with physician's assistant Jesús Kowalski on 6/30/2020 9:40 AM with read back.      PHYSICAL EXAM:  Neurological: Intubated, EO to noxious stim, does not track, dysconjugate gaze, pupils 3mm reactive, +cough/gag, does not follow commands, upper extremities tremors when stimulated, LEs TF to noxious stim.    Cardiovascular: +S1, S2, borderline tachycardic.   Pulmonary: No respiratory distress  Gastrointestinal: soft, non-distended, non-tender  abd: soft, non-tender, nl bs ,+ skin lesion  Extremities: Warm, dry  + skin tear on buttocks      MPRESSION:    Previously seen large left frontal parenchymal hemorrhage has undergone normal temporal evolution, demonstrating overall decreased size, density, and mass effect.    Similar small hemorrhage layering in the occipital horns.    The ventricles, previously slitlike, have reexpanded. No midline shift or effacement of the basal cisterns.    Apparent cytotoxic edema in the left parasagittal frontoparietal region (2-24). Residual vasogenic edema in the left frontoparietal region.    New increased attenuation involving the bilateral parasagittal and left lateral frontoparietal cortices. This could be related to recent seizure or hypoperfusion/hypoxia.    MRI brain   IMPRESSION:    Left frontal lobe hematoma without significant change from the most recent CT scan.    Areas of abnormal signal with gyriform T1 hyperintensity involving the medial frontal lobes and left sylvian fissure region likely the sequelae of subacute to chronic infarction possibly with associated petechial hemorrhage or hemosiderin deposition.    Increased ventricular size when compared with the CT scan from 6/2/2020. SUMMARY: 63 yo F with past medical hx of hypothyroidism, HTN, IDDM, COPD/CHRIS on home O2, RA (on Prednisone), SBO s/p ex lap with lysis of adhesions c/b evisceration,  recent hospitalization in 3/2020 with encephalopathy, significantly hypothyroid (, without myxedema coma), small (stable) right sided SDH, UTI, catatonic reaction to VPA (now resolved) and DKA and psychiatric issues, 5/2020 hospitalization for GIB, Emphysematous cystitis, endometritis, Sigmoid colitis, morbid obesity, functional quadriplegia presented on 5/30 with AMS and was found to have L frontal IPH. Per report, patient seized at home prior to presentation, but has not noted to be seizing during the hospitalization. Continued Goals of care discussion with family; palliative and ethics involved    HCP: Norris Rush, Son [phone]; Daughter, Heavenly Schmidt [phone]; son asks that only him or his sister, Heavenly be contacted for patient updates/information as they are both proxy's for patient (29 May 2020 23:41)    ADMISSION SCORES:  GCS: 5 [E2 VNT M3]  ICH: 2    HOSP COURSE:   5/29: DDAVP  6/9: Found to have LUE spasmodic movements. Aborted after holding LUE.   6/9: [facility] meeting with palliative care, ethics.    OVERNIGHT EVENTS: remains with poor neuro exam, thin to moderate inline and oral secretions.      ICU Vital Signs Last 24 Hrs  T(C): 36.9 (03 Jul 2020 04:00), Max: 38.1 (02 Jul 2020 18:00)  T(F): 98.4 (03 Jul 2020 04:00), Max: 100.6 (02 Jul 2020 18:00)  HR: 100 (03 Jul 2020 04:55) (91 - 120)  BP: 108/74 (03 Jul 2020 04:00) (108/74 - 168/79)  BP(mean): 87 (03 Jul 2020 04:00) (87 - 114)  ABP: --  ABP(mean): --  RR: 19 (03 Jul 2020 04:00) (9 - 32)  SpO2: 100% (03 Jul 2020 04:55) (81% - 100%)      Mode: AC/ CMV (Assist Control/ Continuous Mandatory Ventilation), RR (machine): 14, TV (machine): 450, FiO2: 30, PEEP: 5, ITime: 1, MAP: 11, PIP: 19    MEDICATIONS  (STANDING):  amLODIPine   Tablet 10 milliGRAM(s) Oral daily  chlorhexidine 0.12% Liquid 15 milliLiter(s) Oral Mucosa every 12 hours  chlorhexidine 4% Liquid 1 Application(s) Topical <User Schedule>  dextrose 5%. 1000 milliLiter(s) (50 mL/Hr) IV Continuous <Continuous>  dextrose 50% Injectable 25 Gram(s) IV Push once  doxazosin 2 milliGRAM(s) Oral at bedtime  enoxaparin Injectable 30 milliGRAM(s) SubCutaneous two times a day  fosphenytoin IVPB 200 milliGRAM(s) PE IV Intermittent every 8 hours  gabapentin 300 milliGRAM(s) Oral three times a day  insulin lispro (HumaLOG) corrective regimen sliding scale   SubCutaneous every 6 hours  insulin NPH human recombinant 24 Unit(s) SubCutaneous <User Schedule>  insulin NPH human recombinant 12 Unit(s) SubCutaneous <User Schedule>  insulin NPH human recombinant 50 Unit(s) SubCutaneous <User Schedule>  insulin NPH human recombinant 6 Unit(s) SubCutaneous <User Schedule>  lacosamide Solution 200 milliGRAM(s) Oral two times a day  levETIRAcetam  Solution 1000 milliGRAM(s) Oral two times a day  levothyroxine 150 MICROGram(s) Oral daily  metoprolol tartrate 25 milliGRAM(s) Oral every 6 hours  pantoprazole  Injectable 40 milliGRAM(s) IV Push daily  predniSONE   Tablet 15 milliGRAM(s) Oral <User Schedule>  predniSONE   Tablet 5 milliGRAM(s) Oral at bedtime  simvastatin 20 milliGRAM(s) Oral at bedtime    MEDICATIONS  (PRN):  acetaminophen    Suspension .. 650 milliGRAM(s) Oral every 6 hours PRN Temp greater or equal to 38C (100.4F), Mild Pain (1 - 3)  glycopyrrolate Injectable 0.1 milliGRAM(s) IV Push three times a day PRN secretions  sodium chloride 0.9% lock flush 10 milliLiter(s) IV Push every 1 hour PRN Pre/post blood products, medications, blood draw, and to maintain line patency  traMADol 50 milliGRAM(s) Oral every 4 hours PRN tachycardia, vent dyssynchrony                                  9.1    12.41 )-----------( 375      ( 03 Jul 2020 01:20 )             30.2   07-03    140  |  105  |  20  ----------------------------<  180<H>  4.3   |  21<L>  |  0.65    Ca    9.2      03 Jul 2020 01:20  Phos  3.8     07-03  Mg     2.2     07-03      CT head 6/30:  IMPRESSION:    Previously seen large left frontal parenchymal hemorrhage has undergone normal temporal evolution, demonstrating overall decreased size, density, and mass effect.  Similar small hemorrhage layering in the occipital horns.  The ventricles, previously slitlike, have reexpanded. No midline shift or effacement of the basal cisterns.  Apparent cytotoxic edema in the left parasagittal frontoparietal region (2-24). Residual vasogenic edema in the left frontoparietal region.  New increased attenuation involving the bilateral parasagittal and left lateral frontoparietal cortices. This could be related to recent seizure or hypoperfusion/hypoxia.  Dr. Eid discussed these findings with physician's assistant Jesús Kowalski on 6/30/2020 9:40 AM with read back.      PHYSICAL EXAM:  Neurological: Intubated, EO to noxious stim, does not track, dysconjugate gaze, pupils 3mm reactive, +cough/gag, does not follow commands, upper extremities tremors when stimulated including R>L withdrawal to noxious stim, LEs TF to noxious stim.    Cardiovascular: +S1, S2, borderline tachycardic.   Pulmonary: No respiratory distress  Gastrointestinal: soft, non-distended, non-tender  abd: soft, non-tender, nl bs ,+ skin lesion  Extremities: Warm, dry  + skin tear on buttocks      MPRESSION:    Previously seen large left frontal parenchymal hemorrhage has undergone normal temporal evolution, demonstrating overall decreased size, density, and mass effect.    Similar small hemorrhage layering in the occipital horns.    The ventricles, previously slitlike, have reexpanded. No midline shift or effacement of the basal cisterns.    Apparent cytotoxic edema in the left parasagittal frontoparietal region (2-24). Residual vasogenic edema in the left frontoparietal region.    New increased attenuation involving the bilateral parasagittal and left lateral frontoparietal cortices. This could be related to recent seizure or hypoperfusion/hypoxia.    MRI brain   IMPRESSION:    Left frontal lobe hematoma without significant change from the most recent CT scan.    Areas of abnormal signal with gyriform T1 hyperintensity involving the medial frontal lobes and left sylvian fissure region likely the sequelae of subacute to chronic infarction possibly with associated petechial hemorrhage or hemosiderin deposition.    Increased ventricular size when compared with the CT scan from 6/2/2020.

## 2020-07-03 NOTE — PROGRESS NOTE ADULT - ASSESSMENT
ICH     poor neurologic examination  qshift neurochecks  Continued goals of care conversation, palliative and ethics involved  PHT 250mg q8hr  str cath q6hr  mech vent- CPAP 5/5, no cuff leak, copious secretions

## 2020-07-03 NOTE — PROGRESS NOTE ADULT - ASSESSMENT
ASSESSMENT:  Left frontal intracerebral hemorrhage, poor neurologic examination  Continued goals of care conversation, palliative and ethics involved.       NEURO:  Neuro Q4h check  CT head 6/30: New increased attenuation involving the bilateral parasagittal and left lateral frontoparietal cortices. patient was never hypoxemic, and no seizures on EEG  Seizure management: Keppra, Vimpat, fosphenytoin  7/1 MRI brain:  infarcts in bifrontal, larger in area of prior bleed (left frontal), as well as left occipital  7/2:  dilantin level 9.2 from 7.4 after increasing dose, will repeat level in few days  7/3: dilantin level 10.0  EEG epileptiform discharges d/c EEG   Pain management prn. 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    Trach and PEG being discussed with family. No decision as of yet   Activity: [] OOB as tolerated [] Bedrest [X] PT [X] OT [] PMNR      PULM:  Intubated since 5/29-  awaiting family decision on goals of care-, unclear if family will elect to trach yet.  H/O of COPD   CPAP as tolerated 5/5 on 7/1  CXR 7/1 re ETT  SATS >96 %    will attempt to extubate as feasible once DNI agreed to by proxy.       CV: Intermittently tachy   Keep -160  HTN: Continue norvasc   Sinus tachycardia, metoprolol  25 q6h  Statin for HLD        RENAL:  IVL. Monitor Is/Os  On cardura for urinary retention  Straight cath Q6      GI:  Diet: On Glucerna OGT   Continue Banana flakes and metamucil, LBM 7/1  GI prophylaxis [] not indicated [x] PPI. Protonix is home med.      ENDO: S/P hyperglycemia - IR DM  Endocrine following for adjustment of NPH;   Continue PO synthroid  Continue prednisone 15mg, 5 mg at bedtime (was on it at home for RA). Likely etiology of bruising  Goal euglycemia (-180)  -Adjust NPH as follows: per endo   6am: c/w NPH 50 units sq   12pm: decrease NPH 24 units sq  6pm: c/w NPH 6 units sq   12am: c/w NPH 12 units sq    HEME/ONC:   On SQL for DVT ppx- 30mg SQ q12.  VTE prophylaxis: [X] SCDs [X] chemoprophylaxis [X] high risk of DVT/PE on admission due to: bed bound at baseline  LE doppler 5/30- No DVT  Repeat LE dopplers    ID:  Afebrile   Ertapenem for ESBL found growing in urine culture on 6/4 - ended on  - 6/14      MISC: Awaiting family decision regarding trach/PEG vs palliation. Met with family/ethics and palliative care several times. Meeting with Lesly 7/1 (requested by family) today to discuss goals of care.       Access: Extended dwell catheter PIV placed R forearm.    CODE STATUS:  [X] Full Code     DISPOSITION:  [X] ICU    [X] Patient is at high risk of neurologic deterioration/death due to: resp failure, seizures, hemorrhage.     35 critical care time

## 2020-07-04 LAB
CULTURE RESULTS: SIGNIFICANT CHANGE UP
CULTURE RESULTS: SIGNIFICANT CHANGE UP
GLUCOSE BLDC GLUCOMTR-MCNC: 192 MG/DL — HIGH (ref 70–99)
GLUCOSE BLDC GLUCOMTR-MCNC: 195 MG/DL — HIGH (ref 70–99)
GLUCOSE BLDC GLUCOMTR-MCNC: 211 MG/DL — HIGH (ref 70–99)
SPECIMEN SOURCE: SIGNIFICANT CHANGE UP
SPECIMEN SOURCE: SIGNIFICANT CHANGE UP

## 2020-07-04 PROCEDURE — 99292 CRITICAL CARE ADDL 30 MIN: CPT

## 2020-07-04 PROCEDURE — 99291 CRITICAL CARE FIRST HOUR: CPT

## 2020-07-04 RX ORDER — TRAMADOL HYDROCHLORIDE 50 MG/1
50 TABLET ORAL EVERY 4 HOURS
Refills: 0 | Status: DISCONTINUED | OUTPATIENT
Start: 2020-07-04 | End: 2020-07-10

## 2020-07-04 RX ORDER — DEXAMETHASONE 0.5 MG/5ML
4 ELIXIR ORAL EVERY 6 HOURS
Refills: 0 | Status: COMPLETED | OUTPATIENT
Start: 2020-07-04 | End: 2020-07-05

## 2020-07-04 RX ADMIN — Medication 2: at 05:35

## 2020-07-04 RX ADMIN — GABAPENTIN 300 MILLIGRAM(S): 400 CAPSULE ORAL at 00:52

## 2020-07-04 RX ADMIN — Medication 15 MILLIGRAM(S): at 05:15

## 2020-07-04 RX ADMIN — ENOXAPARIN SODIUM 30 MILLIGRAM(S): 100 INJECTION SUBCUTANEOUS at 17:13

## 2020-07-04 RX ADMIN — MODAFINIL 100 MILLIGRAM(S): 200 TABLET ORAL at 11:43

## 2020-07-04 RX ADMIN — TRAMADOL HYDROCHLORIDE 50 MILLIGRAM(S): 50 TABLET ORAL at 12:23

## 2020-07-04 RX ADMIN — Medication 150 MICROGRAM(S): at 05:14

## 2020-07-04 RX ADMIN — Medication 25 MILLIGRAM(S): at 05:15

## 2020-07-04 RX ADMIN — FOSPHENYTOIN 110 MILLIGRAM(S) PE: 50 INJECTION INTRAMUSCULAR; INTRAVENOUS at 21:33

## 2020-07-04 RX ADMIN — FOSPHENYTOIN 110 MILLIGRAM(S) PE: 50 INJECTION INTRAMUSCULAR; INTRAVENOUS at 05:18

## 2020-07-04 RX ADMIN — Medication 25 MILLIGRAM(S): at 11:43

## 2020-07-04 RX ADMIN — CHLORHEXIDINE GLUCONATE 15 MILLILITER(S): 213 SOLUTION TOPICAL at 17:12

## 2020-07-04 RX ADMIN — Medication 25 MILLIGRAM(S): at 17:13

## 2020-07-04 RX ADMIN — GABAPENTIN 300 MILLIGRAM(S): 400 CAPSULE ORAL at 17:13

## 2020-07-04 RX ADMIN — LEVETIRACETAM 1000 MILLIGRAM(S): 250 TABLET, FILM COATED ORAL at 17:13

## 2020-07-04 RX ADMIN — Medication 4 MILLIGRAM(S): at 17:13

## 2020-07-04 RX ADMIN — Medication 4 MILLIGRAM(S): at 11:46

## 2020-07-04 RX ADMIN — PANTOPRAZOLE SODIUM 40 MILLIGRAM(S): 20 TABLET, DELAYED RELEASE ORAL at 11:43

## 2020-07-04 RX ADMIN — Medication 2: at 17:14

## 2020-07-04 RX ADMIN — ENOXAPARIN SODIUM 30 MILLIGRAM(S): 100 INJECTION SUBCUTANEOUS at 05:19

## 2020-07-04 RX ADMIN — LACOSAMIDE 200 MILLIGRAM(S): 50 TABLET ORAL at 17:12

## 2020-07-04 RX ADMIN — Medication 2 MILLIGRAM(S): at 21:33

## 2020-07-04 RX ADMIN — HUMAN INSULIN 6 UNIT(S): 100 INJECTION, SUSPENSION SUBCUTANEOUS at 17:14

## 2020-07-04 RX ADMIN — CHLORHEXIDINE GLUCONATE 1 APPLICATION(S): 213 SOLUTION TOPICAL at 21:33

## 2020-07-04 RX ADMIN — SIMVASTATIN 20 MILLIGRAM(S): 20 TABLET, FILM COATED ORAL at 21:33

## 2020-07-04 RX ADMIN — HUMAN INSULIN 24 UNIT(S): 100 INJECTION, SUSPENSION SUBCUTANEOUS at 12:29

## 2020-07-04 RX ADMIN — CHLORHEXIDINE GLUCONATE 15 MILLILITER(S): 213 SOLUTION TOPICAL at 05:19

## 2020-07-04 RX ADMIN — Medication 5 MILLIGRAM(S): at 00:51

## 2020-07-04 RX ADMIN — LACOSAMIDE 200 MILLIGRAM(S): 50 TABLET ORAL at 05:15

## 2020-07-04 RX ADMIN — FOSPHENYTOIN 110 MILLIGRAM(S) PE: 50 INJECTION INTRAMUSCULAR; INTRAVENOUS at 14:30

## 2020-07-04 RX ADMIN — LEVETIRACETAM 1000 MILLIGRAM(S): 250 TABLET, FILM COATED ORAL at 05:16

## 2020-07-04 RX ADMIN — GABAPENTIN 300 MILLIGRAM(S): 400 CAPSULE ORAL at 10:34

## 2020-07-04 RX ADMIN — Medication 4: at 12:28

## 2020-07-04 RX ADMIN — HUMAN INSULIN 50 UNIT(S): 100 INJECTION, SUSPENSION SUBCUTANEOUS at 05:35

## 2020-07-04 NOTE — PROGRESS NOTE ADULT - ASSESSMENT
ICH     poor neurologic examination  qshift neurochecks  Continued goals of care conversation, palliative and ethics involved  PHT 250mg q8hr  str cath q6hr  mech vent- CPAP 5/5, no cuff leak, copious secretions  decadron started for tracheal edema

## 2020-07-04 NOTE — PROGRESS NOTE ADULT - COVID-19 NEGATIVE LAB RESULT
COVID-19 ruled out

## 2020-07-04 NOTE — PROGRESS NOTE ADULT - SUBJECTIVE AND OBJECTIVE BOX
SUMMARY: 63 yo F with past medical hx of hypothyroidism, HTN, IDDM, COPD/CHRIS on home O2, RA (on Prednisone), SBO s/p ex lap with lysis of adhesions c/b evisceration,  recent hospitalization in 3/2020 with encephalopathy, significantly hypothyroid (, without myxedema coma), small (stable) right sided SDH, UTI, catatonic reaction to VPA (now resolved) and DKA and psychiatric issues, 5/2020 hospitalization for GIB, Emphysematous cystitis, endometritis, Sigmoid colitis, morbid obesity, functional quadriplegia presented on 5/30 with AMS and was found to have L frontal IPH. Per report, patient seized at home prior to presentation, but has not noted to be seizing during the hospitalization. Continued Goals of care discussion with family; palliative and ethics involved    HCP: Norris Rush, Son [phone]; Daughter, Heavenly Schmidt [phone]; son asks that only him or his sister, Heavenly be contacted for patient updates/information as they are both proxy's for patient (29 May 2020 23:41)    ADMISSION SCORES:  GCS: 5 [E2 VNT M3]  ICH: 2    HOSP COURSE:   5/29: DDAVP  6/9: Found to have LUE spasmodic movements. Aborted after holding LUE.   6/9: [facility] meeting with palliative care, ethics.    OVERNIGHT EVENTS: remains with poor neuro exam, thin to moderate inline and oral secretions.      ICU Vital Signs Last 24 Hrs  T(C): 37.1 (04 Jul 2020 05:00), Max: 37.3 (03 Jul 2020 20:00)  T(F): 98.8 (04 Jul 2020 05:00), Max: 99.1 (03 Jul 2020 20:00)  HR: 101 (04 Jul 2020 06:00) (90 - 110)  BP: 118/76 (04 Jul 2020 06:00) (104/61 - 145/71)  BP(mean): 87 (04 Jul 2020 06:00) (74 - 104)  ABP: --  ABP(mean): --  RR: 26 (04 Jul 2020 06:00) (22 - 31)  SpO2: 100% (04 Jul 2020 06:00) (98% - 100%)      Mode: AC/ CMV (Assist Control/ Continuous Mandatory Ventilation), RR (machine): 14, TV (machine): 450, FiO2: 30, PEEP: 5, ITime: 1, MAP: 11, PIP: 19    MEDICATIONS  (STANDING):  amLODIPine   Tablet 10 milliGRAM(s) Oral daily  chlorhexidine 0.12% Liquid 15 milliLiter(s) Oral Mucosa every 12 hours  chlorhexidine 4% Liquid 1 Application(s) Topical <User Schedule>  dextrose 5%. 1000 milliLiter(s) (50 mL/Hr) IV Continuous <Continuous>  dextrose 50% Injectable 25 Gram(s) IV Push once  doxazosin 2 milliGRAM(s) Oral at bedtime  enoxaparin Injectable 30 milliGRAM(s) SubCutaneous two times a day  fosphenytoin IVPB 250 milliGRAM(s) PE IV Intermittent every 8 hours  gabapentin 300 milliGRAM(s) Oral three times a day  insulin lispro (HumaLOG) corrective regimen sliding scale   SubCutaneous every 6 hours  insulin NPH human recombinant 24 Unit(s) SubCutaneous <User Schedule>  insulin NPH human recombinant 12 Unit(s) SubCutaneous <User Schedule>  insulin NPH human recombinant 50 Unit(s) SubCutaneous <User Schedule>  insulin NPH human recombinant 6 Unit(s) SubCutaneous <User Schedule>  lacosamide Solution 200 milliGRAM(s) Oral two times a day  levETIRAcetam  Solution 1000 milliGRAM(s) Oral two times a day  levothyroxine 150 MICROGram(s) Oral daily  metoprolol tartrate 25 milliGRAM(s) Oral every 6 hours  modafinil 100 milliGRAM(s) Oral daily  pantoprazole  Injectable 40 milliGRAM(s) IV Push daily  predniSONE   Tablet 15 milliGRAM(s) Oral <User Schedule>  predniSONE   Tablet 5 milliGRAM(s) Oral at bedtime  simvastatin 20 milliGRAM(s) Oral at bedtime    MEDICATIONS  (PRN):  acetaminophen    Suspension .. 650 milliGRAM(s) Oral every 6 hours PRN Temp greater or equal to 38C (100.4F), Mild Pain (1 - 3)  glycopyrrolate Injectable 0.1 milliGRAM(s) IV Push three times a day PRN secretions  sodium chloride 0.9% lock flush 10 milliLiter(s) IV Push every 1 hour PRN Pre/post blood products, medications, blood draw, and to maintain line patency  traMADol 50 milliGRAM(s) Oral every 4 hours PRN tachycardia, vent dyssynchrony                              9.1    12.41 )-----------( 375      ( 03 Jul 2020 01:20 )             30.2   07-03    140  |  105  |  20  ----------------------------<  180<H>  4.3   |  21<L>  |  0.65    Ca    9.2      03 Jul 2020 01:20  Phos  3.8     07-03  Mg     2.2     07-03        CT head 6/30:  IMPRESSION:    Previously seen large left frontal parenchymal hemorrhage has undergone normal temporal evolution, demonstrating overall decreased size, density, and mass effect.  Similar small hemorrhage layering in the occipital horns.  The ventricles, previously slitlike, have reexpanded. No midline shift or effacement of the basal cisterns.  Apparent cytotoxic edema in the left parasagittal frontoparietal region (2-24). Residual vasogenic edema in the left frontoparietal region.  New increased attenuation involving the bilateral parasagittal and left lateral frontoparietal cortices. This could be related to recent seizure or hypoperfusion/hypoxia.  Dr. Eid discussed these findings with physician's assistant Jesús Kowalski on 6/30/2020 9:40 AM with read back.      PHYSICAL EXAM:  Neurological: Intubated, EO to noxious stim, does not track, dysconjugate gaze, pupils 3mm reactive, +cough/gag, does not follow commands, upper extremities tremors when stimulated including R>L withdrawal to noxious stim, LEs TF to noxious stim.    Cardiovascular: +S1, S2, borderline tachycardic.   Pulmonary: No respiratory distress  Gastrointestinal: soft, non-distended, non-tender  abd: soft, non-tender, nl bs ,+ skin lesion  Extremities: Warm, dry  + skin tear on buttocks      MPRESSION:    Previously seen large left frontal parenchymal hemorrhage has undergone normal temporal evolution, demonstrating overall decreased size, density, and mass effect.    Similar small hemorrhage layering in the occipital horns.    The ventricles, previously slitlike, have reexpanded. No midline shift or effacement of the basal cisterns.    Apparent cytotoxic edema in the left parasagittal frontoparietal region (2-24). Residual vasogenic edema in the left frontoparietal region.    New increased attenuation involving the bilateral parasagittal and left lateral frontoparietal cortices. This could be related to recent seizure or hypoperfusion/hypoxia.    MRI brain   IMPRESSION:    Left frontal lobe hematoma without significant change from the most recent CT scan.    Areas of abnormal signal with gyriform T1 hyperintensity involving the medial frontal lobes and left sylvian fissure region likely the sequelae of subacute to chronic infarction possibly with associated petechial hemorrhage or hemosiderin deposition.    Increased ventricular size when compared with the CT scan from 6/2/2020. SUMMARY: 65 yo F with past medical hx of hypothyroidism, HTN, IDDM, COPD/CHRIS on home O2, RA (on Prednisone), SBO s/p ex lap with lysis of adhesions c/b evisceration,  recent hospitalization in 3/2020 with encephalopathy, significantly hypothyroid (, without myxedema coma), small (stable) right sided SDH, UTI, catatonic reaction to VPA (now resolved) and DKA and psychiatric issues, 5/2020 hospitalization for GIB, Emphysematous cystitis, endometritis, Sigmoid colitis, morbid obesity, functional quadriplegia presented on 5/30 with AMS and was found to have L frontal IPH. Per report, patient seized at home prior to presentation, but has not noted to be seizing during the hospitalization. Continued Goals of care discussion with family; palliative and ethics involved    HCP: Norris Rush, Son [phone]; Daughter, Heavenly Schmidt [phone]; son asks that only him or his sister, Heavenly be contacted for patient updates/information as they are both proxy's for patient (29 May 2020 23:41)    ADMISSION SCORES:  GCS: 5 [E2 VNT M3]  ICH: 2    HOSP COURSE:   5/29: DDAVP  6/9: Found to have LUE spasmodic movements. Aborted after holding LUE.   6/9: [facility] meeting with palliative care, ethics.    OVERNIGHT EVENTS: remains with poor neuro exam, thin to moderate inline and oral secretions.  tolerated PS       ICU Vital Signs Last 24 Hrs  T(C): 37.1 (04 Jul 2020 05:00), Max: 37.3 (03 Jul 2020 20:00)  T(F): 98.8 (04 Jul 2020 05:00), Max: 99.1 (03 Jul 2020 20:00)  HR: 101 (04 Jul 2020 06:00) (90 - 110)  BP: 118/76 (04 Jul 2020 06:00) (104/61 - 145/71)  BP(mean): 87 (04 Jul 2020 06:00) (74 - 104)  ABP: --  ABP(mean): --  RR: 26 (04 Jul 2020 06:00) (22 - 31)  SpO2: 100% (04 Jul 2020 06:00) (98% - 100%)      Mode: AC/ CMV (Assist Control/ Continuous Mandatory Ventilation), RR (machine): 14, TV (machine): 450, FiO2: 30, PEEP: 5, ITime: 1, MAP: 11, PIP: 19    MEDICATIONS  (STANDING):  amLODIPine   Tablet 10 milliGRAM(s) Oral daily  chlorhexidine 0.12% Liquid 15 milliLiter(s) Oral Mucosa every 12 hours  chlorhexidine 4% Liquid 1 Application(s) Topical <User Schedule>  dextrose 5%. 1000 milliLiter(s) (50 mL/Hr) IV Continuous <Continuous>  dextrose 50% Injectable 25 Gram(s) IV Push once  doxazosin 2 milliGRAM(s) Oral at bedtime  enoxaparin Injectable 30 milliGRAM(s) SubCutaneous two times a day  fosphenytoin IVPB 250 milliGRAM(s) PE IV Intermittent every 8 hours  gabapentin 300 milliGRAM(s) Oral three times a day  insulin lispro (HumaLOG) corrective regimen sliding scale   SubCutaneous every 6 hours  insulin NPH human recombinant 24 Unit(s) SubCutaneous <User Schedule>  insulin NPH human recombinant 12 Unit(s) SubCutaneous <User Schedule>  insulin NPH human recombinant 50 Unit(s) SubCutaneous <User Schedule>  insulin NPH human recombinant 6 Unit(s) SubCutaneous <User Schedule>  lacosamide Solution 200 milliGRAM(s) Oral two times a day  levETIRAcetam  Solution 1000 milliGRAM(s) Oral two times a day  levothyroxine 150 MICROGram(s) Oral daily  metoprolol tartrate 25 milliGRAM(s) Oral every 6 hours  modafinil 100 milliGRAM(s) Oral daily  pantoprazole  Injectable 40 milliGRAM(s) IV Push daily  predniSONE   Tablet 15 milliGRAM(s) Oral <User Schedule>  predniSONE   Tablet 5 milliGRAM(s) Oral at bedtime  simvastatin 20 milliGRAM(s) Oral at bedtime    MEDICATIONS  (PRN):  acetaminophen    Suspension .. 650 milliGRAM(s) Oral every 6 hours PRN Temp greater or equal to 38C (100.4F), Mild Pain (1 - 3)  glycopyrrolate Injectable 0.1 milliGRAM(s) IV Push three times a day PRN secretions  sodium chloride 0.9% lock flush 10 milliLiter(s) IV Push every 1 hour PRN Pre/post blood products, medications, blood draw, and to maintain line patency  traMADol 50 milliGRAM(s) Oral every 4 hours PRN tachycardia, vent dyssynchrony                              9.1    12.41 )-----------( 375      ( 03 Jul 2020 01:20 )             30.2   07-03    140  |  105  |  20  ----------------------------<  180<H>  4.3   |  21<L>  |  0.65    Ca    9.2      03 Jul 2020 01:20  Phos  3.8     07-03  Mg     2.2     07-03        CT head 6/30:  IMPRESSION:    Previously seen large left frontal parenchymal hemorrhage has undergone normal temporal evolution, demonstrating overall decreased size, density, and mass effect.  Similar small hemorrhage layering in the occipital horns.  The ventricles, previously slitlike, have reexpanded. No midline shift or effacement of the basal cisterns.  Apparent cytotoxic edema in the left parasagittal frontoparietal region (2-24). Residual vasogenic edema in the left frontoparietal region.  New increased attenuation involving the bilateral parasagittal and left lateral frontoparietal cortices. This could be related to recent seizure or hypoperfusion/hypoxia.  Dr. Eid discussed these findings with physician's assistant Jesús Kowalski on 6/30/2020 9:40 AM with read back.      PHYSICAL EXAM:  Neurological: Intubated, EO to noxious stim, does not track, dysconjugate gaze, pupils 3mm reactive, +cough/gag, does not follow commands, upper extremities tremors when stimulate left UE spontaneous, withdrew LE    Pulmonary: No respiratory distress  Gastrointestinal: soft, non-distended, non-tender  abd: soft, non-tender, nl bs ,+ skin lesion  Extremities: Warm, dry  + skin tear on buttocks      MPRESSION:    Previously seen large left frontal parenchymal hemorrhage has undergone normal temporal evolution, demonstrating overall decreased size, density, and mass effect.    Similar small hemorrhage layering in the occipital horns.    The ventricles, previously slitlike, have reexpanded. No midline shift or effacement of the basal cisterns.    Apparent cytotoxic edema in the left parasagittal frontoparietal region (2-24). Residual vasogenic edema in the left frontoparietal region.    New increased attenuation involving the bilateral parasagittal and left lateral frontoparietal cortices. This could be related to recent seizure or hypoperfusion/hypoxia.    MRI brain   IMPRESSION:    Left frontal lobe hematoma without significant change from the most recent CT scan.    Areas of abnormal signal with gyriform T1 hyperintensity involving the medial frontal lobes and left sylvian fissure region likely the sequelae of subacute to chronic infarction possibly with associated petechial hemorrhage or hemosiderin deposition.    Increased ventricular size when compared with the CT scan from 6/2/2020.

## 2020-07-04 NOTE — PROGRESS NOTE ADULT - ASSESSMENT
ASSESSMENT:  Left frontal intracerebral hemorrhage, poor neurologic examination  Continued goals of care conversation, palliative and ethics involved.       NEURO:  Neuro Q4h check  CT head 6/30: New increased attenuation involving the bilateral parasagittal and left lateral frontoparietal cortices. patient was never hypoxemic, and no seizures on EEG  Seizure management: Keppra, Vimpat, fosphenytoin  7/1 MRI brain:  infarcts in bifrontal, larger in area of prior bleed (left frontal), as well as left occipital  7/2:  dilantin level 9.2 from 7.4 after increasing dose, will repeat level in few days  7/3: dilantin level 10.0  EEG epileptiform discharges d/c EEG   Pain management prn. 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    Trach and PEG being discussed with family. No decision as of yet   Activity: [] OOB as tolerated [] Bedrest [X] PT [X] OT [] PMNR      PULM:  Intubated since 5/29-  awaiting family decision on goals of care-, unclear if family will elect to trach yet.  H/O of COPD   CPAP as tolerated 5/5 on 7/1  CXR 7/1 re ETT  SATS >96 %    will attempt to extubate as feasible once DNI agreed to by proxy.       CV: Intermittently tachy   Keep -160  HTN: Continue norvasc   Sinus tachycardia, metoprolol  25 q6h  Statin for HLD        RENAL:  IVL. Monitor Is/Os  On cardura for urinary retention  Straight cath Q6      GI:  Diet: On Glucerna OGT   Continue Banana flakes and metamucil, LBM 7/1  GI prophylaxis [] not indicated [x] PPI. Protonix is home med.      ENDO: S/P hyperglycemia - IR DM  Endocrine following for adjustment of NPH;   Continue PO synthroid  Continue prednisone 15mg, 5 mg at bedtime (was on it at home for RA). Likely etiology of bruising  Goal euglycemia (-180)  -Adjust NPH as follows: per endo   6am: c/w NPH 50 units sq   12pm: decrease NPH 24 units sq  6pm: c/w NPH 6 units sq   12am: c/w NPH 12 units sq    HEME/ONC:   On SQL for DVT ppx- 30mg SQ q12.  VTE prophylaxis: [X] SCDs [X] chemoprophylaxis [X] high risk of DVT/PE on admission due to: bed bound at baseline  LE doppler 5/30- No DVT  Repeat LE dopplers    ID:  Afebrile   Ertapenem for ESBL found growing in urine culture on 6/4 - ended on  - 6/14      MISC: Awaiting family decision regarding trach/PEG vs palliation. Met with family/ethics and palliative care several times. Meeting with Lesly 7/1 (requested by family) today to discuss goals of care.       Access: Extended dwell catheter PIV placed R forearm.    CODE STATUS:  [X] Full Code     DISPOSITION:  [X] ICU    [X] Patient is at high risk of neurologic deterioration/death due to: resp failure, seizures, hemorrhage.     35 critical care time ASSESSMENT:  Left frontal intracerebral hemorrhage, poor neurologic examination  Continued goals of care conversation, palliative and ethics involved.       NEURO:  Neuro Q4h check  CT head 6/30: New increased attenuation involving the bilateral parasagittal and left lateral frontoparietal cortices. patient was never hypoxemic, and no seizures on EEG  Seizure management: Keppra, Vimpat, fosphenytoin  7/1 MRI brain:  infarcts in bifrontal, larger in area of prior bleed (left frontal), as well as left occipital  7/2:  dilantin level 9.2 from 7.4 after increasing dose, will repeat level in few days  7/3: dilantin level 10.0  added Provigil yestedray for neurostimulation    EEG epileptiform discharges d/c EEG   Pain management prn. 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    Trach and PEG being discussed with family. No decision as of yet   Activity: [] OOB as tolerated [] Bedrest [X] PT [X] OT [] PMNR      PULM:  Intubated since 5/29-  awaiting family decision on goals of care-, unclear if family will elect to trach yet.  H/O of COPD   CPAP as tolerated 5/5 on 7/1  CXR 7/1 re ETT  SATS >96 %    will attempt to extubate tomorrow  thin oral secretions   no cuff leak, decadron 4 mg q 6 hr        CV: Intermittently tachy   Keep -160  HTN: Continue norvasc   Sinus tachycardia, metoprolol  25 q6h  Statin for HLD        RENAL:  IVL. Monitor Is/Os  On cardura for urinary retention  Straight cath Q6      GI:  Diet: On Glucerna OGT   NPO after midnight  Continue Banana flakes and metamucil, LBM 7/4  GI prophylaxis [] not indicated [x] PPI. Protonix is home med.      ENDO: S/P hyperglycemia - IR DM  Endocrine following for adjustment of NPH;   Continue PO synthroid  Continue prednisone 15mg, 5 mg at bedtime (was on it at home for RA). Likely etiology of bruising  Goal euglycemia (-180)  -Adjust NPH as follows: per endo   6am: c/w NPH 50 units sq   12pm: decrease NPH 24 units sq  6pm: c/w NPH 6 units sq   12am: c/w NPH 12 units sq    HEME/ONC:   On SQL for DVT ppx- 30mg SQ q12.  VTE prophylaxis: [X] SCDs [X] chemoprophylaxis [X] high risk of DVT/PE on admission due to: bed bound at baseline  LE doppler 5/30- No DVT    ID:  Afebrile   Ertapenem for ESBL found growing in urine culture on 6/4 - ended on  - 6/14      MISC: Awaiting family decision regarding trach/PEG vs palliation. Met with family/ethics and palliative care several times. Meeting with Lesly 7/1 (requested by family) today to discuss goals of care.       Access: Extended dwell catheter PIV placed R forearm.    CODE STATUS:  [X] Full Code     DISPOSITION:  [X] ICU    [X] Patient is at high risk of neurologic deterioration/death due to: resp failure, seizures, hemorrhage.     40 critical care time

## 2020-07-05 DIAGNOSIS — J96.90 RESPIRATORY FAILURE, UNSPECIFIED, UNSPECIFIED WHETHER WITH HYPOXIA OR HYPERCAPNIA: ICD-10-CM

## 2020-07-05 LAB
GLUCOSE BLDC GLUCOMTR-MCNC: 159 MG/DL — HIGH (ref 70–99)
GLUCOSE BLDC GLUCOMTR-MCNC: 162 MG/DL — HIGH (ref 70–99)
GLUCOSE BLDC GLUCOMTR-MCNC: 184 MG/DL — HIGH (ref 70–99)
GLUCOSE BLDC GLUCOMTR-MCNC: 190 MG/DL — HIGH (ref 70–99)
GLUCOSE BLDC GLUCOMTR-MCNC: 205 MG/DL — HIGH (ref 70–99)

## 2020-07-05 PROCEDURE — 71045 X-RAY EXAM CHEST 1 VIEW: CPT | Mod: 26

## 2020-07-05 PROCEDURE — 99291 CRITICAL CARE FIRST HOUR: CPT

## 2020-07-05 PROCEDURE — 99292 CRITICAL CARE ADDL 30 MIN: CPT

## 2020-07-05 RX ORDER — DEXAMETHASONE 0.5 MG/5ML
4 ELIXIR ORAL EVERY 6 HOURS
Refills: 0 | Status: COMPLETED | OUTPATIENT
Start: 2020-07-05 | End: 2020-07-06

## 2020-07-05 RX ORDER — DOXAZOSIN MESYLATE 4 MG
4 TABLET ORAL AT BEDTIME
Refills: 0 | Status: DISCONTINUED | OUTPATIENT
Start: 2020-07-05 | End: 2020-07-13

## 2020-07-05 RX ORDER — PANTOPRAZOLE SODIUM 20 MG/1
40 TABLET, DELAYED RELEASE ORAL DAILY
Refills: 0 | Status: DISCONTINUED | OUTPATIENT
Start: 2020-07-05 | End: 2020-07-11

## 2020-07-05 RX ORDER — FENTANYL CITRATE 50 UG/ML
25 INJECTION INTRAVENOUS ONCE
Refills: 0 | Status: DISCONTINUED | OUTPATIENT
Start: 2020-07-05 | End: 2020-07-05

## 2020-07-05 RX ORDER — HUMAN INSULIN 100 [IU]/ML
25 INJECTION, SUSPENSION SUBCUTANEOUS ONCE
Refills: 0 | Status: COMPLETED | OUTPATIENT
Start: 2020-07-05 | End: 2020-07-05

## 2020-07-05 RX ORDER — ROBINUL 0.2 MG/ML
0.2 INJECTION INTRAMUSCULAR; INTRAVENOUS THREE TIMES A DAY
Refills: 0 | Status: DISCONTINUED | OUTPATIENT
Start: 2020-07-05 | End: 2020-07-13

## 2020-07-05 RX ADMIN — MODAFINIL 100 MILLIGRAM(S): 200 TABLET ORAL at 10:12

## 2020-07-05 RX ADMIN — FOSPHENYTOIN 110 MILLIGRAM(S) PE: 50 INJECTION INTRAMUSCULAR; INTRAVENOUS at 15:54

## 2020-07-05 RX ADMIN — Medication 250 MILLIGRAM(S): at 22:46

## 2020-07-05 RX ADMIN — GABAPENTIN 300 MILLIGRAM(S): 400 CAPSULE ORAL at 16:56

## 2020-07-05 RX ADMIN — CHLORHEXIDINE GLUCONATE 15 MILLILITER(S): 213 SOLUTION TOPICAL at 05:00

## 2020-07-05 RX ADMIN — ROBINUL 0.1 MILLIGRAM(S): 0.2 INJECTION INTRAMUSCULAR; INTRAVENOUS at 10:09

## 2020-07-05 RX ADMIN — LEVETIRACETAM 1000 MILLIGRAM(S): 250 TABLET, FILM COATED ORAL at 04:57

## 2020-07-05 RX ADMIN — Medication 150 MICROGRAM(S): at 04:57

## 2020-07-05 RX ADMIN — Medication 4 MILLIGRAM(S): at 22:45

## 2020-07-05 RX ADMIN — ENOXAPARIN SODIUM 30 MILLIGRAM(S): 100 INJECTION SUBCUTANEOUS at 16:56

## 2020-07-05 RX ADMIN — LACOSAMIDE 200 MILLIGRAM(S): 50 TABLET ORAL at 04:57

## 2020-07-05 RX ADMIN — Medication 4 MILLIGRAM(S): at 04:58

## 2020-07-05 RX ADMIN — Medication 4 MILLIGRAM(S): at 16:56

## 2020-07-05 RX ADMIN — PANTOPRAZOLE SODIUM 40 MILLIGRAM(S): 20 TABLET, DELAYED RELEASE ORAL at 22:45

## 2020-07-05 RX ADMIN — FOSPHENYTOIN 110 MILLIGRAM(S) PE: 50 INJECTION INTRAMUSCULAR; INTRAVENOUS at 04:58

## 2020-07-05 RX ADMIN — SIMVASTATIN 20 MILLIGRAM(S): 20 TABLET, FILM COATED ORAL at 22:45

## 2020-07-05 RX ADMIN — GABAPENTIN 300 MILLIGRAM(S): 400 CAPSULE ORAL at 10:07

## 2020-07-05 RX ADMIN — GABAPENTIN 300 MILLIGRAM(S): 400 CAPSULE ORAL at 01:55

## 2020-07-05 RX ADMIN — ROBINUL 0.2 MILLIGRAM(S): 0.2 INJECTION INTRAMUSCULAR; INTRAVENOUS at 16:56

## 2020-07-05 RX ADMIN — AMLODIPINE BESYLATE 10 MILLIGRAM(S): 2.5 TABLET ORAL at 04:57

## 2020-07-05 RX ADMIN — Medication 4 MILLIGRAM(S): at 00:45

## 2020-07-05 RX ADMIN — HUMAN INSULIN 25 UNIT(S): 100 INJECTION, SUSPENSION SUBCUTANEOUS at 06:09

## 2020-07-05 RX ADMIN — CHLORHEXIDINE GLUCONATE 1 APPLICATION(S): 213 SOLUTION TOPICAL at 20:53

## 2020-07-05 RX ADMIN — PANTOPRAZOLE SODIUM 40 MILLIGRAM(S): 20 TABLET, DELAYED RELEASE ORAL at 10:08

## 2020-07-05 RX ADMIN — FENTANYL CITRATE 25 MICROGRAM(S): 50 INJECTION INTRAVENOUS at 01:54

## 2020-07-05 RX ADMIN — Medication 25 MILLIGRAM(S): at 04:57

## 2020-07-05 RX ADMIN — Medication 25 MILLIGRAM(S): at 17:04

## 2020-07-05 RX ADMIN — ROBINUL 0.1 MILLIGRAM(S): 0.2 INJECTION INTRAMUSCULAR; INTRAVENOUS at 02:00

## 2020-07-05 RX ADMIN — Medication 25 MILLIGRAM(S): at 10:11

## 2020-07-05 RX ADMIN — HUMAN INSULIN 24 UNIT(S): 100 INJECTION, SUSPENSION SUBCUTANEOUS at 15:13

## 2020-07-05 RX ADMIN — CHLORHEXIDINE GLUCONATE 15 MILLILITER(S): 213 SOLUTION TOPICAL at 16:57

## 2020-07-05 RX ADMIN — Medication 25 MILLIGRAM(S): at 01:55

## 2020-07-05 RX ADMIN — HUMAN INSULIN 6 UNIT(S): 100 INJECTION, SUSPENSION SUBCUTANEOUS at 17:14

## 2020-07-05 RX ADMIN — Medication 2: at 10:43

## 2020-07-05 RX ADMIN — Medication 4 MILLIGRAM(S): at 10:43

## 2020-07-05 RX ADMIN — LACOSAMIDE 200 MILLIGRAM(S): 50 TABLET ORAL at 16:56

## 2020-07-05 RX ADMIN — ENOXAPARIN SODIUM 30 MILLIGRAM(S): 100 INJECTION SUBCUTANEOUS at 05:00

## 2020-07-05 RX ADMIN — Medication 2: at 17:13

## 2020-07-05 RX ADMIN — HUMAN INSULIN 12 UNIT(S): 100 INJECTION, SUSPENSION SUBCUTANEOUS at 00:46

## 2020-07-05 RX ADMIN — Medication 2: at 00:46

## 2020-07-05 RX ADMIN — LEVETIRACETAM 1000 MILLIGRAM(S): 250 TABLET, FILM COATED ORAL at 16:55

## 2020-07-05 NOTE — CONSULT NOTE ADULT - ASSESSMENT
65 y/o female with AMS and seizure and found to have left frontal ICH, SAH and IVH on CT head and intubated for respiratory failure since 5/29. Primary team to attempt extubation but indirect laryngoscopy will not be helpful at this time due to secretions and limited visualization of upper airway while pt is intubated. Will plan for direct laryngoscopy at bedside 7/6 prior to extubation.

## 2020-07-05 NOTE — CONSULT NOTE ADULT - CONSULT REQUESTED DATE/TIME
02-Jun-2020 13:57
02-Jun-2020 16:57
02-Jun-2020 19:49
03-Jun-2020 10:00
04-Jun-2020 16:00
08-Jun-2020
08-Jun-2020 14:00
16-Jun-2020 15:25
30-May-2020 03:32
05-Jul-2020

## 2020-07-05 NOTE — CONSULT NOTE ADULT - CONSULT REASON
Follow up consult:    To assist the team in navigating the ethical complexities associated with a 64 year old woman with intracranial hemorrhage and on mechanical ventilation whose son has withdrawn communication regarding goals of care.
Follow-up consultation: To assist the team in navigating the ethical complexities associated with a 64-year-old woman with intracranial hemorrhage whose decision makers are struggling end of life decisions.
GOALS OF CARE
L IPH
Patient without capacity due to intercranial hemorrhage whose son is refusing to speak with medical team.
T2DM uncontrolled
To assist the team in navigating the ethical complexities associated with a 64 year old woman with intracranial hemorrhage whose decision makers are struggling end of life decisions.
Type 2 Diabetes Uncontrolled
abdominal wound
Evaluation of upper airway

## 2020-07-05 NOTE — CONSULT NOTE ADULT - REASON FOR ADMISSION
ICH, SAH w/ IVH

## 2020-07-05 NOTE — CONSULT NOTE ADULT - CONSULT REQUESTED BY NAME
Dr. Kimo Everett
Dr. Mar
Dr. Thomason
ELIAZAR Armenta
ELIAZAR Armenta NP Palliative
[facility]
RAMO Kowalski
team
[facility]

## 2020-07-05 NOTE — PROGRESS NOTE ADULT - SUBJECTIVE AND OBJECTIVE BOX
Chief complaint:   Patient is a 64y old  Female who presents with a chief complaint of ICH, SAH w/ IVH (05 Jul 2020 14:32)    HPI:  65 yo F with AMS since this morning.  Pt. was confused, didn't recognize son this morning.  He notes she was sleeping more than usual yesterday, which tipped off son that something was wrong.  At 11 am pt. was gasping for air like she was seizing. 2 hours ago pt. was dry heaving, her chest was going up and down and she started shaking like she was having a seizure.  Pt. has no history of seizures per son.  		PMH: hypothyroidism, HTN, IDDM, COPD/CHRIS on home O2, RA (on Prednisone), SBO s/p ex lap with lysis of adhesions c/b evisceration,  recent hospitalization in 3/2020 with encephalopathy, significantly hypothyroid (, without myxedema coma), small (stable) right sided SDH, UTI, catatonic reaction to VPA (now resolved) and DKA and psychiatric issues, 5/2020 hospitalization for GIB, Emphysematous cystitis, endometritis, Sigmoid colitis, morbid obesity, functional quadriplegia    HCP: Norris Rush, Son [phone]; Daughter, Heavenly Schmidt [phone]; son asks that only him or his sister, Heavenly be contacted for patient updates/information as they are both proxy's for patient (29 May 2020 23:41)        24hr EVENTS:  episodes of more violent shaking and lip smacking witnessed    ROS: [ x]  Unable to assess due to mental status   All other systems negative    -----------------------------------------------------------------------------------------------------------------------------------------------------------------------------------  ICU Vital Signs Last 24 Hrs  T(C): 37.3 (05 Jul 2020 20:00), Max: 37.6 (05 Jul 2020 08:00)  T(F): 99.1 (05 Jul 2020 20:00), Max: 99.7 (05 Jul 2020 08:00)  HR: 90 (05 Jul 2020 20:00) (86 - 112)  BP: 90/62 (05 Jul 2020 20:00) (90/62 - 167/89)  BP(mean): 72 (05 Jul 2020 20:00) (72 - 114)  ABP: --  ABP(mean): --  RR: 18 (05 Jul 2020 20:00) (14 - 29)  SpO2: 99% (05 Jul 2020 20:00) (97% - 100%)      I&O's Summary    04 Jul 2020 07:01  -  05 Jul 2020 07:00  --------------------------------------------------------  IN: 1525 mL / OUT: 1100 mL / NET: 425 mL    05 Jul 2020 07:01  -  05 Jul 2020 20:41  --------------------------------------------------------  IN: 680 mL / OUT: 300 mL / NET: 380 mL        MEDICATIONS  (STANDING):  amLODIPine   Tablet 10 milliGRAM(s) Oral daily  chlorhexidine 0.12% Liquid 15 milliLiter(s) Oral Mucosa every 12 hours  chlorhexidine 4% Liquid 1 Application(s) Topical <User Schedule>  dexAMETHasone  Injectable 4 milliGRAM(s) IV Push every 6 hours  dextrose 5%. 1000 milliLiter(s) (50 mL/Hr) IV Continuous <Continuous>  dextrose 50% Injectable 25 Gram(s) IV Push once  doxazosin 4 milliGRAM(s) Oral at bedtime  enoxaparin Injectable 30 milliGRAM(s) SubCutaneous two times a day  fosphenytoin IVPB 250 milliGRAM(s) PE IV Intermittent every 8 hours  gabapentin 300 milliGRAM(s) Oral three times a day  insulin lispro (HumaLOG) corrective regimen sliding scale   SubCutaneous every 6 hours  insulin NPH human recombinant 24 Unit(s) SubCutaneous <User Schedule>  insulin NPH human recombinant 12 Unit(s) SubCutaneous <User Schedule>  insulin NPH human recombinant 50 Unit(s) SubCutaneous <User Schedule>  insulin NPH human recombinant 6 Unit(s) SubCutaneous <User Schedule>  lacosamide Solution 200 milliGRAM(s) Oral two times a day  levETIRAcetam  Solution 1000 milliGRAM(s) Oral two times a day  levothyroxine 150 MICROGram(s) Oral daily  metoprolol tartrate 25 milliGRAM(s) Oral every 6 hours  pantoprazole  Injectable 40 milliGRAM(s) IV Push daily  simvastatin 20 milliGRAM(s) Oral at bedtime      RESPIRATORY:  Mode: CPAP with PS  FiO2: 30  PEEP: 5  PS: 5  MAP: 7  PIP: 11        IMAGING:   Recent imaging studies were reviewed.    LAB RESULTS:    -----------------------------------------------------------------------------------------------------------------------------------------------------------------------------------  Intubated, off sedation  eye opening to nox stim, no tracking, no following commands  PERRL 3mm, EOMI   +corneals/cough/gag  bilat upper ext tremors with stim L>R  bilat LE TF

## 2020-07-05 NOTE — PROGRESS NOTE ADULT - ASSESSMENT
ICH     poor neurologic examination  qshift neurochecks  Continued goals of care conversation, palliative and ethics involved  PHT 250mg q8hr  str cath q6hr  mech vent- CPAP 5/5, no cuff leak, copious secretions  plan for direct laryngoscopy at bedside 7/6 prior to extubation.

## 2020-07-05 NOTE — CONSULT NOTE ADULT - PROBLEM SELECTOR PROBLEM 1
Respiratory failure
ICH (intracerebral hemorrhage)
Uncontrolled type 2 diabetes mellitus with hyperglycemia

## 2020-07-05 NOTE — CONSULT NOTE ADULT - PROBLEM SELECTOR RECOMMENDATION 9
- Plan for bedside direct laryngoscopy on 7/6 with Dr. Garza  - Call ENT with questions x [phone]
-agree with team's increase in NPH to 40 units sq q6  -would make her correctional scale q6 not q4. Can switch to regular insulin.  -Elevations due to TF and steroids  DISPO: basal/bolus, doses TBD. Patient's son is interested in a Freestyle Sonny.
VEEG in progress: noted rusn of GPEDS  On Keppra /Vimpat

## 2020-07-05 NOTE — PROGRESS NOTE ADULT - ASSESSMENT
ASSESSMENT:  Left frontal intracerebral hemorrhage, poor neurologic examination  Continued goals of care conversation, palliative and ethics involved.       NEURO:  Neuro Q4h check  CT head 6/30: New increased attenuation involving the bilateral parasagittal and left lateral frontoparietal cortices. patient was never hypoxemic, and no seizures on EEG  Seizure management: Keppra, Vimpat, fosphenytoin  7/1 MRI brain:  infarcts in bifrontal, larger in area of prior bleed (left frontal), as well as left occipital  7/2:  dilantin level 9.2 from 7.4 after increasing dose, will repeat level in few days  7/3: dilantin level 10.0  added Provigil yestedray for neurostimulation    EEG epileptiform discharges d/c EEG   Pain management prn. 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    Trach and PEG being discussed with family. No decision as of yet   Activity: [] OOB as tolerated [] Bedrest [X] PT [X] OT [] PMNR      PULM:  Intubated since 5/29-  awaiting family decision on goals of care-, unclear if family will elect to trach yet.  H/O of COPD   CPAP as tolerated 5/5 on 7/1  CXR 7/1 re ETT  SATS >96 %    will attempt to extubate tomorrow  thin oral secretions   no cuff leak, decadron 4 mg q 6 hr        CV: Intermittently tachy   Keep -160  HTN: Continue norvasc   Sinus tachycardia, metoprolol  25 q6h  Statin for HLD        RENAL:  IVL. Monitor Is/Os  On cardura for urinary retention  Straight cath Q6      GI:  Diet: On Glucerna OGT   NPO after midnight  Continue Banana flakes and metamucil, LBM 7/4  GI prophylaxis [] not indicated [x] PPI. Protonix is home med.      ENDO: S/P hyperglycemia - IR DM  Endocrine following for adjustment of NPH;   Continue PO synthroid  Continue prednisone 15mg, 5 mg at bedtime (was on it at home for RA). Likely etiology of bruising  Goal euglycemia (-180)  -Adjust NPH as follows: per endo   6am: c/w NPH 50 units sq   12pm: decrease NPH 24 units sq  6pm: c/w NPH 6 units sq   12am: c/w NPH 12 units sq    HEME/ONC:   On SQL for DVT ppx- 30mg SQ q12.  VTE prophylaxis: [X] SCDs [X] chemoprophylaxis [X] high risk of DVT/PE on admission due to: bed bound at baseline  LE doppler 5/30- No DVT    ID:  Afebrile   Ertapenem for ESBL found growing in urine culture on 6/4 - ended on  - 6/14      MISC: Awaiting family decision regarding trach/PEG vs palliation. Met with family/ethics and palliative care several times. Meeting with Lesly 7/1 (requested by family) today to discuss goals of care.       Access: Extended dwell catheter PIV placed R forearm.    CODE STATUS:  [X] Full Code     DISPOSITION:  [X] ICU    [X] Patient is at high risk of neurologic deterioration/death due to: resp failure, seizures, hemorrhage.     40 critical care time ASSESSMENT:  Left frontal intracerebral hemorrhage, poor neurologic examination  Continued goals of care conversation, palliative and ethics involved.       NEURO:  in vegetative state   Neuro Q4h check  CT head 6/30: New increased attenuation involving the bilateral parasagittal and left lateral frontoparietal cortices. patient was never hypoxemic, and no seizures on EEG  Seizure management: Keppra, Vimpat, fosphenytoin  7/1 MRI brain:  infarcts in bifrontal, larger in area of prior bleed (left frontal), as well as left occipital  7/2:  dilantin level 9.2 from 7.4 after increasing dose, will repeat level in few days  7/3: dilantin level 10.0  added Provigil yestedray for neurostimulation    modafanil 200 mg daily   EEG epileptiform discharges d/c EEG   Pain management prn. 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    Trach and PEG being discussed with family. No decision as of yet   Activity: [] OOB as tolerated [] Bedrest [X] PT [X] OT [] PMNR      PULM:  Intubated since 5/29-  awaiting family decision on goals of care-, unclear if family will elect to trach yet.  H/O of COPD   CPAP  ( has been on CPAP for 3 days)   CXR 7/1 re ETT  SATS >96 %    thin oral secretions   no cuff leak, decadron 4 mg q 6 hr , called ENT to scope        CV: Intermittently tachy   Keep -160  HTN: Continue norvasc   Sinus tachycardia, metoprolol  25 q6h  Statin for HLD        RENAL:  IVL. Monitor Is/Os  On cardura for urinary retention  Straight cath Q6      GI:  Diet: On Glucerna OGT   NPO for possible extubation   Continue Banana flakes and metamucil, LBM 7/4  GI prophylaxis [] not indicated [x] PPI. Protonix is home med.      ENDO: S/P hyperglycemia - IR DM  Endocrine following for adjustment of NPH;   Continue PO synthroid  Continue prednisone 15mg, 5 mg at bedtime (was on it at home for RA) once decadron is discontinued 9 tomorrow)   Goal euglycemia (-180)  -Adjust NPH as follows: per endo   6am: c/w NPH 50 units sq   12pm: decrease NPH 24 units sq  6pm: c/w NPH 6 units sq   12am: c/w NPH 12 units sq    HEME/ONC:   On SQL for DVT ppx- 30mg SQ q12.  VTE prophylaxis: [X] SCDs [X] chemoprophylaxis [X] high risk of DVT/PE on admission due to: bed bound at baseline  LE doppler 5/30- No DVT    ID:  Afebrile   Ertapenem for ESBL found growing in urine culture on 6/4 - ended on  - 6/14      MISC: Awaiting family decision regarding trach/PEG vs palliation. Met with family/ethics and palliative care several times. will attempt to rediscuss GOC today     Access: Extended dwell catheter PIV placed R forearm.    CODE STATUS:  [X] Full Code     DISPOSITION:  [X] ICU    [X] Patient is at high risk of neurologic deterioration/death due to: resp failure, seizures, hemorrhage.     40 critical care time

## 2020-07-05 NOTE — CONSULT NOTE ADULT - SUBJECTIVE AND OBJECTIVE BOX
CC: Evaluation of upper airway    HPI: 65 y/o female with PMH hypothyroidism, HTN, IDDM, COPD/CHRIS on home O2, RA (on Prednisone), SBO s/p ex lap with lysis of adhesions c/b evisceration,  recent hospitalization in 3/2020 with encephalopathy, significantly hypothyroid (, without myxedema coma), small (stable) right sided SDH, UTI, catatonic reaction to VPA (now resolved) and DKA and psychiatric issues, 2020 hospitalization for GIB, Emphysematous cystitis, endometritis, Sigmoid colitis, morbid obesity, functional quadriplegia admitted for AMS and seizure and found to have left frontal ICH, SAH and IVH on CT head. Pt has been intubated since . ENT consult for evaluation of upper airway prior to extubation. Per team, pt has been on CPAP for 3 days and no cuff leak is appreciated. Pt remains intubated, is off sedation with poor neurological exam.       PAST MEDICAL & SURGICAL HISTORY:  Diverticulosis  RA (Rheumatoid Arthritis)  Tooth Abscess  Arthritis  Cigarette Smoker  Chronic Asthma  Adult Hypothyroidism  Borderline Diabetes Mellitus  High Cholesterol  H/O: HTN  Wrist Disorder: S/P Surgery  History of  Section    Allergies    Depakote (Other)  Seroquel (Other (Severe))    Intolerances      MEDICATIONS  (STANDING):  amLODIPine   Tablet 10 milliGRAM(s) Oral daily  chlorhexidine 0.12% Liquid 15 milliLiter(s) Oral Mucosa every 12 hours  chlorhexidine 4% Liquid 1 Application(s) Topical <User Schedule>  dexAMETHasone  Injectable 4 milliGRAM(s) IV Push every 6 hours  dextrose 5%. 1000 milliLiter(s) (50 mL/Hr) IV Continuous <Continuous>  dextrose 50% Injectable 25 Gram(s) IV Push once  doxazosin 4 milliGRAM(s) Oral at bedtime  enoxaparin Injectable 30 milliGRAM(s) SubCutaneous two times a day  fosphenytoin IVPB 250 milliGRAM(s) PE IV Intermittent every 8 hours  gabapentin 300 milliGRAM(s) Oral three times a day  insulin lispro (HumaLOG) corrective regimen sliding scale   SubCutaneous every 6 hours  insulin NPH human recombinant 24 Unit(s) SubCutaneous <User Schedule>  insulin NPH human recombinant 12 Unit(s) SubCutaneous <User Schedule>  insulin NPH human recombinant 50 Unit(s) SubCutaneous <User Schedule>  insulin NPH human recombinant 6 Unit(s) SubCutaneous <User Schedule>  lacosamide Solution 200 milliGRAM(s) Oral two times a day  levETIRAcetam  Solution 1000 milliGRAM(s) Oral two times a day  levothyroxine 150 MICROGram(s) Oral daily  metoprolol tartrate 25 milliGRAM(s) Oral every 6 hours  pantoprazole  Injectable 40 milliGRAM(s) IV Push daily  simvastatin 20 milliGRAM(s) Oral at bedtime    MEDICATIONS  (PRN):  acetaminophen    Suspension .. 650 milliGRAM(s) Oral every 6 hours PRN Temp greater or equal to 38C (100.4F), Mild Pain (1 - 3)  glycopyrrolate Injectable 0.2 milliGRAM(s) IV Push three times a day PRN secretions  sodium chloride 0.9% lock flush 10 milliLiter(s) IV Push every 1 hour PRN Pre/post blood products, medications, blood draw, and to maintain line patency  traMADol 50 milliGRAM(s) Oral every 4 hours PRN Moderate Pain (4 - 6)      Social History: Unable to obtain due to patient's condition.    Family history: No pertinent family history in first degree relatives      ROS:   Unable to obtain due to patient's condition.    Vital Signs Last 24 Hrs  T(C): 37.1 (2020 11:00), Max: 37.6 (2020 08:00)  T(F): 98.8 (2020 11:00), Max: 99.7 (2020 08:00)  HR: 86 (2020 12:10) (86 - 112)  BP: 131/66 (2020 11:00) (98/67 - 162/96)  BP(mean): 86 (2020 11:00) (77 - 114)  RR: 22 (2020 11:00) (14 - 29)  SpO2: 100% (2020 12:10) (97% - 100%)       PHYSICAL EXAM:  Gen: NAD  Skin: No rashes, bruises, or lesions  Head: Normocephalic, Atraumatic  Face: no edema, erythema, or fluctuance. Parotid glands soft without mass  Eyes: no scleral injection  Nose: Nares bilaterally patent, no discharge  Mouth: ET tube in place. OG tube in place. No Stridor / Drooling / Trismus.  Mucosa moist, tongue/uvula midline, oropharynx clear  Neck: Flat, supple, no lymphadenopathy, trachea midline, no masses  Lymphatic: No lymphadenopathy  Resp: breathing easily, no stridor  CV: no peripheral edema/cyanosis  GI: nondistended   Peripheral vascular: no JVD or edema  Neuro: unable to assess due to patient's condition

## 2020-07-05 NOTE — PROGRESS NOTE ADULT - SUBJECTIVE AND OBJECTIVE BOX
SUMMARY: 65 yo F with past medical hx of hypothyroidism, HTN, IDDM, COPD/CHRIS on home O2, RA (on Prednisone), SBO s/p ex lap with lysis of adhesions c/b evisceration,  recent hospitalization in 3/2020 with encephalopathy, significantly hypothyroid (, without myxedema coma), small (stable) right sided SDH, UTI, catatonic reaction to VPA (now resolved) and DKA and psychiatric issues, 5/2020 hospitalization for GIB, Emphysematous cystitis, endometritis, Sigmoid colitis, morbid obesity, functional quadriplegia presented on 5/30 with AMS and was found to have L frontal IPH. Per report, patient seized at home prior to presentation, but has not noted to be seizing during the hospitalization. Continued Goals of care discussion with family; palliative and ethics involved    HCP: Norris Rush, Son [phone]; Daughter, Heavenly Schmidt [phone]; son asks that only him or his sister, Heavenly be contacted for patient updates/information as they are both proxy's for patient (29 May 2020 23:41)    ADMISSION SCORES:  GCS: 5 [E2 VNT M3]  ICH: 2    HOSP COURSE:   5/29: DDAVP  6/9: Found to have LUE spasmodic movements. Aborted after holding LUE.   6/9: [facility] meeting with palliative care, ethics.    OVERNIGHT EVENTS: remains with poor neuro exam, thin to moderate inline and oral secretions.  tolerated PS     Vital Signs Last 24 Hrs  T(C): 37.4 (05 Jul 2020 04:00), Max: 37.4 (05 Jul 2020 04:00)  T(F): 99.3 (05 Jul 2020 04:00), Max: 99.3 (05 Jul 2020 04:00)  HR: 89 (05 Jul 2020 05:46) (86 - 112)  BP: 147/87 (05 Jul 2020 05:00) (98/67 - 162/96)  BP(mean): 103 (05 Jul 2020 05:00) (77 - 114)  RR: 20 (05 Jul 2020 05:00) (14 - 29)  SpO2: 100% (05 Jul 2020 05:46) (97% - 100%)    I&O's Detail    04 Jul 2020 07:01  -  05 Jul 2020 07:00  --------------------------------------------------------  IN:    Enteral Tube Flush: 240 mL    Glucerna: 1235 mL    IV PiggyBack: 50 mL  Total IN: 1525 mL    OUT:    Intermittent Catheterization - Urethral: 1100 mL  Total OUT: 1100 mL    Total NET: 425 mL        I&O's Summary    04 Jul 2020 07:01  -  05 Jul 2020 07:00  --------------------------------------------------------  IN: 1525 mL / OUT: 1100 mL / NET: 425 mL        PHYSICAL EXAM:  Neurological: Intubated, EO to noxious stim, does not track, dysconjugate gaze, pupils 3mm reactive, +cough/gag, does not follow commands, upper extremities tremors when stimulate left UE spontaneous, withdrew LE    Pulmonary: No respiratory distress  Gastrointestinal: soft, non-distended, non-tender  abd: soft, non-tender, nl bs ,+ skin lesion  Extremities: Warm, dry  + skin tear on buttocksTUBES/LINES:        LABS:                  CAPILLARY BLOOD GLUCOSE      POCT Blood Glucose.: 205 mg/dL (05 Jul 2020 05:49)  POCT Blood Glucose.: 190 mg/dL (05 Jul 2020 00:40)  POCT Blood Glucose.: 192 mg/dL (04 Jul 2020 16:41)  POCT Blood Glucose.: 211 mg/dL (04 Jul 2020 12:26)      Drug Levels: [] N/A  Phenytoin Level, Serum: 10.0 ug/mL (07-03 @ 01:20)  Phenytoin Level, Serum: 9.2 ug/mL (07-02 @ 05:25)  Phenytoin Level, Serum: 7.4 ug/mL (07-01 @ 05:20)    CSF Analysis: [] N/A      Allergies    Depakote (Other)  Seroquel (Other (Severe))    Intolerances      MEDICATIONS:  Antibiotics:    Neuro:  acetaminophen    Suspension .. 650 milliGRAM(s) Oral every 6 hours PRN  fosphenytoin IVPB 250 milliGRAM(s) PE IV Intermittent every 8 hours  gabapentin 300 milliGRAM(s) Oral three times a day  lacosamide Solution 200 milliGRAM(s) Oral two times a day  levETIRAcetam  Solution 1000 milliGRAM(s) Oral two times a day  modafinil 100 milliGRAM(s) Oral daily  traMADol 50 milliGRAM(s) Oral every 4 hours PRN    Anticoagulation:  enoxaparin Injectable 30 milliGRAM(s) SubCutaneous two times a day    OTHER:  amLODIPine   Tablet 10 milliGRAM(s) Oral daily  chlorhexidine 0.12% Liquid 15 milliLiter(s) Oral Mucosa every 12 hours  chlorhexidine 4% Liquid 1 Application(s) Topical <User Schedule>  dextrose 50% Injectable 25 Gram(s) IV Push once  doxazosin 2 milliGRAM(s) Oral at bedtime  glycopyrrolate Injectable 0.1 milliGRAM(s) IV Push three times a day PRN  insulin lispro (HumaLOG) corrective regimen sliding scale   SubCutaneous every 6 hours  insulin NPH human recombinant 24 Unit(s) SubCutaneous <User Schedule>  insulin NPH human recombinant 12 Unit(s) SubCutaneous <User Schedule>  insulin NPH human recombinant 50 Unit(s) SubCutaneous <User Schedule>  insulin NPH human recombinant 6 Unit(s) SubCutaneous <User Schedule>  levothyroxine 150 MICROGram(s) Oral daily  metoprolol tartrate 25 milliGRAM(s) Oral every 6 hours  pantoprazole  Injectable 40 milliGRAM(s) IV Push daily  simvastatin 20 milliGRAM(s) Oral at bedtime    IVF:  dextrose 5%. 1000 milliLiter(s) IV Continuous <Continuous>    CULTURES:  Culture Results:   Normal Respiratory Perla present (06-29 @ 22:28)  Culture Results:   No Growth Final (06-29 @ 22:02)    RADIOLOGY & ADDITIONAL TESTS:

## 2020-07-06 LAB
GLUCOSE BLDC GLUCOMTR-MCNC: 119 MG/DL — HIGH (ref 70–99)
GLUCOSE BLDC GLUCOMTR-MCNC: 136 MG/DL — HIGH (ref 70–99)
GLUCOSE BLDC GLUCOMTR-MCNC: 165 MG/DL — HIGH (ref 70–99)
GLUCOSE BLDC GLUCOMTR-MCNC: 166 MG/DL — HIGH (ref 70–99)
GLUCOSE BLDC GLUCOMTR-MCNC: 188 MG/DL — HIGH (ref 70–99)
GLUCOSE BLDC GLUCOMTR-MCNC: 198 MG/DL — HIGH (ref 70–99)
GLUCOSE BLDC GLUCOMTR-MCNC: 57 MG/DL — LOW (ref 70–99)
GLUCOSE BLDC GLUCOMTR-MCNC: 62 MG/DL — LOW (ref 70–99)

## 2020-07-06 PROCEDURE — 99291 CRITICAL CARE FIRST HOUR: CPT

## 2020-07-06 PROCEDURE — 99232 SBSQ HOSP IP/OBS MODERATE 35: CPT

## 2020-07-06 PROCEDURE — 71045 X-RAY EXAM CHEST 1 VIEW: CPT | Mod: 26

## 2020-07-06 PROCEDURE — 99232 SBSQ HOSP IP/OBS MODERATE 35: CPT | Mod: 25

## 2020-07-06 PROCEDURE — 31505 DIAGNOSTIC LARYNGOSCOPY: CPT

## 2020-07-06 RX ORDER — DEXTROSE 50 % IN WATER 50 %
50 SYRINGE (ML) INTRAVENOUS ONCE
Refills: 0 | Status: COMPLETED | OUTPATIENT
Start: 2020-07-06 | End: 2020-07-06

## 2020-07-06 RX ORDER — FENTANYL CITRATE 50 UG/ML
50 INJECTION INTRAVENOUS ONCE
Refills: 0 | Status: DISCONTINUED | OUTPATIENT
Start: 2020-07-06 | End: 2020-07-06

## 2020-07-06 RX ORDER — GABAPENTIN 400 MG/1
300 CAPSULE ORAL EVERY 8 HOURS
Refills: 0 | Status: DISCONTINUED | OUTPATIENT
Start: 2020-07-06 | End: 2020-07-13

## 2020-07-06 RX ORDER — HUMAN INSULIN 100 [IU]/ML
18 INJECTION, SUSPENSION SUBCUTANEOUS
Refills: 0 | Status: DISCONTINUED | OUTPATIENT
Start: 2020-07-06 | End: 2020-07-11

## 2020-07-06 RX ADMIN — Medication 250 MILLIGRAM(S): at 05:10

## 2020-07-06 RX ADMIN — MODAFINIL 200 MILLIGRAM(S): 200 TABLET ORAL at 11:29

## 2020-07-06 RX ADMIN — Medication 250 MILLIGRAM(S): at 21:01

## 2020-07-06 RX ADMIN — PANTOPRAZOLE SODIUM 40 MILLIGRAM(S): 20 TABLET, DELAYED RELEASE ORAL at 11:29

## 2020-07-06 RX ADMIN — HUMAN INSULIN 50 UNIT(S): 100 INJECTION, SUSPENSION SUBCUTANEOUS at 05:34

## 2020-07-06 RX ADMIN — CHLORHEXIDINE GLUCONATE 15 MILLILITER(S): 213 SOLUTION TOPICAL at 17:16

## 2020-07-06 RX ADMIN — Medication 25 MILLIGRAM(S): at 00:22

## 2020-07-06 RX ADMIN — Medication 4 MILLIGRAM(S): at 21:00

## 2020-07-06 RX ADMIN — HUMAN INSULIN 24 UNIT(S): 100 INJECTION, SUSPENSION SUBCUTANEOUS at 11:28

## 2020-07-06 RX ADMIN — LEVETIRACETAM 1000 MILLIGRAM(S): 250 TABLET, FILM COATED ORAL at 05:10

## 2020-07-06 RX ADMIN — Medication 25 MILLIGRAM(S): at 11:29

## 2020-07-06 RX ADMIN — ENOXAPARIN SODIUM 30 MILLIGRAM(S): 100 INJECTION SUBCUTANEOUS at 17:16

## 2020-07-06 RX ADMIN — LACOSAMIDE 200 MILLIGRAM(S): 50 TABLET ORAL at 05:10

## 2020-07-06 RX ADMIN — Medication 250 MILLIGRAM(S): at 13:15

## 2020-07-06 RX ADMIN — LEVETIRACETAM 1000 MILLIGRAM(S): 250 TABLET, FILM COATED ORAL at 17:16

## 2020-07-06 RX ADMIN — Medication 2: at 11:28

## 2020-07-06 RX ADMIN — HUMAN INSULIN 12 UNIT(S): 100 INJECTION, SUSPENSION SUBCUTANEOUS at 00:22

## 2020-07-06 RX ADMIN — FENTANYL CITRATE 50 MICROGRAM(S): 50 INJECTION INTRAVENOUS at 10:28

## 2020-07-06 RX ADMIN — FENTANYL CITRATE 50 MICROGRAM(S): 50 INJECTION INTRAVENOUS at 10:40

## 2020-07-06 RX ADMIN — CHLORHEXIDINE GLUCONATE 15 MILLILITER(S): 213 SOLUTION TOPICAL at 05:35

## 2020-07-06 RX ADMIN — Medication 2: at 05:35

## 2020-07-06 RX ADMIN — Medication 2: at 00:22

## 2020-07-06 RX ADMIN — Medication 4 MILLIGRAM(S): at 05:30

## 2020-07-06 RX ADMIN — ENOXAPARIN SODIUM 30 MILLIGRAM(S): 100 INJECTION SUBCUTANEOUS at 05:11

## 2020-07-06 RX ADMIN — GABAPENTIN 300 MILLIGRAM(S): 400 CAPSULE ORAL at 02:55

## 2020-07-06 RX ADMIN — Medication 25 MILLIGRAM(S): at 05:11

## 2020-07-06 RX ADMIN — AMLODIPINE BESYLATE 10 MILLIGRAM(S): 2.5 TABLET ORAL at 05:10

## 2020-07-06 RX ADMIN — Medication 150 MICROGRAM(S): at 05:10

## 2020-07-06 RX ADMIN — Medication 50 MILLILITER(S): at 17:12

## 2020-07-06 RX ADMIN — Medication 4 MILLIGRAM(S): at 00:22

## 2020-07-06 RX ADMIN — GABAPENTIN 300 MILLIGRAM(S): 400 CAPSULE ORAL at 13:14

## 2020-07-06 RX ADMIN — CHLORHEXIDINE GLUCONATE 1 APPLICATION(S): 213 SOLUTION TOPICAL at 21:01

## 2020-07-06 RX ADMIN — GABAPENTIN 300 MILLIGRAM(S): 400 CAPSULE ORAL at 21:00

## 2020-07-06 RX ADMIN — SIMVASTATIN 20 MILLIGRAM(S): 20 TABLET, FILM COATED ORAL at 21:01

## 2020-07-06 RX ADMIN — ROBINUL 0.2 MILLIGRAM(S): 0.2 INJECTION INTRAMUSCULAR; INTRAVENOUS at 04:20

## 2020-07-06 RX ADMIN — LACOSAMIDE 200 MILLIGRAM(S): 50 TABLET ORAL at 17:16

## 2020-07-06 RX ADMIN — HUMAN INSULIN 12 UNIT(S): 100 INJECTION, SUSPENSION SUBCUTANEOUS at 23:27

## 2020-07-06 NOTE — PROGRESS NOTE ADULT - SUBJECTIVE AND OBJECTIVE BOX
Chief Complaint/Follow-up on:     Subjective:    MEDICATIONS  (STANDING):  amLODIPine   Tablet 10 milliGRAM(s) Oral daily  chlorhexidine 0.12% Liquid 15 milliLiter(s) Oral Mucosa every 12 hours  chlorhexidine 4% Liquid 1 Application(s) Topical <User Schedule>  dextrose 5%. 1000 milliLiter(s) (50 mL/Hr) IV Continuous <Continuous>  dextrose 50% Injectable 25 Gram(s) IV Push once  doxazosin 4 milliGRAM(s) Oral at bedtime  enoxaparin Injectable 30 milliGRAM(s) SubCutaneous two times a day  gabapentin   Solution 300 milliGRAM(s) Oral every 8 hours  insulin lispro (HumaLOG) corrective regimen sliding scale   SubCutaneous every 6 hours  insulin NPH human recombinant 24 Unit(s) SubCutaneous <User Schedule>  insulin NPH human recombinant 12 Unit(s) SubCutaneous <User Schedule>  insulin NPH human recombinant 50 Unit(s) SubCutaneous <User Schedule>  insulin NPH human recombinant 6 Unit(s) SubCutaneous <User Schedule>  lacosamide Solution 200 milliGRAM(s) Oral two times a day  levETIRAcetam  Solution 1000 milliGRAM(s) Oral two times a day  levothyroxine 150 MICROGram(s) Oral daily  metoprolol tartrate 25 milliGRAM(s) Oral every 6 hours  modafinil 200 milliGRAM(s) Oral daily  pantoprazole   Suspension 40 milliGRAM(s) Oral daily  phenytoin   Suspension 250 milliGRAM(s) Oral every 8 hours  simvastatin 20 milliGRAM(s) Oral at bedtime    MEDICATIONS  (PRN):  acetaminophen    Suspension .. 650 milliGRAM(s) Oral every 6 hours PRN Temp greater or equal to 38C (100.4F), Mild Pain (1 - 3)  glycopyrrolate Injectable 0.2 milliGRAM(s) IV Push three times a day PRN secretions  sodium chloride 0.9% lock flush 10 milliLiter(s) IV Push every 1 hour PRN Pre/post blood products, medications, blood draw, and to maintain line patency  traMADol 50 milliGRAM(s) Oral every 4 hours PRN Moderate Pain (4 - 6)      PHYSICAL EXAM:  VITALS: T(C): 37.1 (07-06-20 @ 15:00)  T(F): 98.8 (07-06-20 @ 15:00), Max: 99.1 (07-05-20 @ 20:00)  HR: 102 (07-06-20 @ 17:00) (88 - 102)  BP: 109/67 (07-06-20 @ 17:00) (90/62 - 134/93)  RR:  (18 - 24)  SpO2:  (97% - 100%)  Wt(kg): --  GENERAL: NAD, well-groomed, well-developed  EYES: No proptosis, no injection  HEENT:  Atraumatic, Normocephalic, moist mucous membranes  THYROID: Normal size, no palpable nodules  RESPIRATORY: Clear to auscultation bilaterally; No rales, rhonchi, wheezing, or rubs  CARDIOVASCULAR: Regular rate and rhythm; No murmurs; no peripheral edema  GI: Soft, nontender, non distended, normal bowel sounds  CUSHING'S SIGNS: no striae    POCT Blood Glucose.: 57 mg/dL (07-06-20 @ 17:08)  POCT Blood Glucose.: 62 mg/dL (07-06-20 @ 17:03)  POCT Blood Glucose.: 166 mg/dL (07-06-20 @ 11:19)  POCT Blood Glucose.: 188 mg/dL (07-06-20 @ 05:26)  POCT Blood Glucose.: 198 mg/dL (07-06-20 @ 00:18)  POCT Blood Glucose.: 184 mg/dL (07-05-20 @ 17:09)  POCT Blood Glucose.: 159 mg/dL (07-05-20 @ 15:10)  POCT Blood Glucose.: 162 mg/dL (07-05-20 @ 10:37)  POCT Blood Glucose.: 205 mg/dL (07-05-20 @ 05:49)  POCT Blood Glucose.: 190 mg/dL (07-05-20 @ 00:40)  POCT Blood Glucose.: 192 mg/dL (07-04-20 @ 16:41)  POCT Blood Glucose.: 211 mg/dL (07-04-20 @ 12:26)  POCT Blood Glucose.: 195 mg/dL (07-04-20 @ 05:31)  POCT Blood Glucose.: 115 mg/dL (07-03-20 @ 23:38)  POCT Blood Glucose.: 105 mg/dL (07-03-20 @ 18:22)                Thyroid Function Tests:  06-11 @ 13:58 TSH 8.26 FreeT4 -- T3 55 Anti TPO -- Anti Thyroglobulin Ab -- TSI -- Chief Complaint/Follow-up on: T2D    Subjective: Patient's bs had been doing well when I saw her around 1PM. On f/u she went down to 57 at 6pm so the primary team hekd her 6pm dose and I decreased her 12pm dose for tomorrow.   Pt is still intubated and sedated with no clear plan due to issues with her family.     MEDICATIONS  (STANDING):  amLODIPine   Tablet 10 milliGRAM(s) Oral daily  chlorhexidine 0.12% Liquid 15 milliLiter(s) Oral Mucosa every 12 hours  chlorhexidine 4% Liquid 1 Application(s) Topical <User Schedule>  dextrose 5%. 1000 milliLiter(s) (50 mL/Hr) IV Continuous <Continuous>  dextrose 50% Injectable 25 Gram(s) IV Push once  doxazosin 4 milliGRAM(s) Oral at bedtime  enoxaparin Injectable 30 milliGRAM(s) SubCutaneous two times a day  gabapentin   Solution 300 milliGRAM(s) Oral every 8 hours  insulin lispro (HumaLOG) corrective regimen sliding scale   SubCutaneous every 6 hours  insulin NPH human recombinant 24 Unit(s) SubCutaneous <User Schedule>  insulin NPH human recombinant 12 Unit(s) SubCutaneous <User Schedule>  insulin NPH human recombinant 50 Unit(s) SubCutaneous <User Schedule>  insulin NPH human recombinant 6 Unit(s) SubCutaneous <User Schedule>  lacosamide Solution 200 milliGRAM(s) Oral two times a day  levETIRAcetam  Solution 1000 milliGRAM(s) Oral two times a day  levothyroxine 150 MICROGram(s) Oral daily  metoprolol tartrate 25 milliGRAM(s) Oral every 6 hours  modafinil 200 milliGRAM(s) Oral daily  pantoprazole   Suspension 40 milliGRAM(s) Oral daily  phenytoin   Suspension 250 milliGRAM(s) Oral every 8 hours  simvastatin 20 milliGRAM(s) Oral at bedtime    MEDICATIONS  (PRN):  acetaminophen    Suspension .. 650 milliGRAM(s) Oral every 6 hours PRN Temp greater or equal to 38C (100.4F), Mild Pain (1 - 3)  glycopyrrolate Injectable 0.2 milliGRAM(s) IV Push three times a day PRN secretions  sodium chloride 0.9% lock flush 10 milliLiter(s) IV Push every 1 hour PRN Pre/post blood products, medications, blood draw, and to maintain line patency  traMADol 50 milliGRAM(s) Oral every 4 hours PRN Moderate Pain (4 - 6)      PHYSICAL EXAM:  VITALS: T(C): 37.1 (07-06-20 @ 15:00)  T(F): 98.8 (07-06-20 @ 15:00), Max: 99.1 (07-05-20 @ 20:00)  HR: 102 (07-06-20 @ 17:00) (88 - 102)  BP: 109/67 (07-06-20 @ 17:00) (90/62 - 134/93)  RR:  (18 - 24)  SpO2:  (97% - 100%)  Wt(kg): --  GENERAL: +intubated, sedated  HEENT:  Atraumatic, Normocephalic, moist mucous membranes  RESPIRATORY: Clear to auscultation bilaterally; No rales, rhonchi, wheezing, or rubs  CARDIOVASCULAR: Regular rate and rhythm; No murmurs  GI: Soft, nontender, non distended, normal bowel sounds    POCT Blood Glucose.: 57 mg/dL (07-06-20 @ 17:08)  POCT Blood Glucose.: 62 mg/dL (07-06-20 @ 17:03)  POCT Blood Glucose.: 166 mg/dL (07-06-20 @ 11:19)  POCT Blood Glucose.: 188 mg/dL (07-06-20 @ 05:26)  POCT Blood Glucose.: 198 mg/dL (07-06-20 @ 00:18)  POCT Blood Glucose.: 184 mg/dL (07-05-20 @ 17:09)  POCT Blood Glucose.: 159 mg/dL (07-05-20 @ 15:10)  POCT Blood Glucose.: 162 mg/dL (07-05-20 @ 10:37)  POCT Blood Glucose.: 205 mg/dL (07-05-20 @ 05:49)  POCT Blood Glucose.: 190 mg/dL (07-05-20 @ 00:40)  POCT Blood Glucose.: 192 mg/dL (07-04-20 @ 16:41)  POCT Blood Glucose.: 211 mg/dL (07-04-20 @ 12:26)  POCT Blood Glucose.: 195 mg/dL (07-04-20 @ 05:31)  POCT Blood Glucose.: 115 mg/dL (07-03-20 @ 23:38)  POCT Blood Glucose.: 105 mg/dL (07-03-20 @ 18:22)                Thyroid Function Tests:  06-11 @ 13:58 TSH 8.26

## 2020-07-06 NOTE — PROGRESS NOTE ADULT - ASSESSMENT
64y s/p glidescope to eval upper airway, no laryngeal edema noted. PT Vc unable to visualized fully 2/2 ET tube.

## 2020-07-06 NOTE — PROGRESS NOTE ADULT - SUBJECTIVE AND OBJECTIVE BOX
CC: Evaluation of upper airway    HPI: 65 y/o female with PMH hypothyroidism, HTN, IDDM, COPD/CHRIS on home O2, RA (on Prednisone), SBO s/p ex lap with lysis of adhesions c/b evisceration,  recent hospitalization in 3/2020 with encephalopathy, significantly hypothyroid (, without myxedema coma), small (stable) right sided SDH, UTI, catatonic reaction to VPA (now resolved) and DKA and psychiatric issues, 2020 hospitalization for GIB, Emphysematous cystitis, endometritis, Sigmoid colitis, morbid obesity, functional quadriplegia admitted for AMS and seizure and found to have left frontal ICH, SAH and IVH on CT head. Pt has been intubated since . ENT consult for evaluation of upper airway prior to extubation. Per team, pt has been on CPAP for 3 days and no cuff leak is appreciated. Pt remains intubated, is off sedation with poor neurological exam.     PAST MEDICAL & SURGICAL HISTORY:  Diverticulosis  RA (Rheumatoid Arthritis)  Tooth Abscess  Arthritis  Cigarette Smoker  Chronic Asthma  Adult Hypothyroidism  Borderline Diabetes Mellitus  High Cholesterol  H/O: HTN  Wrist Disorder: S/P Surgery  History of  Section    Allergies    Depakote (Other)  Seroquel (Other (Severe))    Intolerances      MEDICATIONS  (STANDING):  amLODIPine   Tablet 10 milliGRAM(s) Oral daily  chlorhexidine 0.12% Liquid 15 milliLiter(s) Oral Mucosa every 12 hours  chlorhexidine 4% Liquid 1 Application(s) Topical <User Schedule>  dextrose 5%. 1000 milliLiter(s) (50 mL/Hr) IV Continuous <Continuous>  dextrose 50% Injectable 25 Gram(s) IV Push once  doxazosin 4 milliGRAM(s) Oral at bedtime  enoxaparin Injectable 30 milliGRAM(s) SubCutaneous two times a day  gabapentin   Solution 300 milliGRAM(s) Oral every 8 hours  insulin lispro (HumaLOG) corrective regimen sliding scale   SubCutaneous every 6 hours  insulin NPH human recombinant 24 Unit(s) SubCutaneous <User Schedule>  insulin NPH human recombinant 12 Unit(s) SubCutaneous <User Schedule>  insulin NPH human recombinant 50 Unit(s) SubCutaneous <User Schedule>  insulin NPH human recombinant 6 Unit(s) SubCutaneous <User Schedule>  lacosamide Solution 200 milliGRAM(s) Oral two times a day  levETIRAcetam  Solution 1000 milliGRAM(s) Oral two times a day  levothyroxine 150 MICROGram(s) Oral daily  metoprolol tartrate 25 milliGRAM(s) Oral every 6 hours  modafinil 200 milliGRAM(s) Oral daily  pantoprazole   Suspension 40 milliGRAM(s) Oral daily  phenytoin   Suspension 250 milliGRAM(s) Oral every 8 hours  simvastatin 20 milliGRAM(s) Oral at bedtime    MEDICATIONS  (PRN):  acetaminophen    Suspension .. 650 milliGRAM(s) Oral every 6 hours PRN Temp greater or equal to 38C (100.4F), Mild Pain (1 - 3)  glycopyrrolate Injectable 0.2 milliGRAM(s) IV Push three times a day PRN secretions  sodium chloride 0.9% lock flush 10 milliLiter(s) IV Push every 1 hour PRN Pre/post blood products, medications, blood draw, and to maintain line patency  traMADol 50 milliGRAM(s) Oral every 4 hours PRN Moderate Pain (4 - 6)    social history: see consult     family history: see consult     ROS:   ENT: all negative except as noted in HPI   Pulm: denies SOB, cough, hemoptysis  Neuro: denies numbness/tingling, loss of sensation  Endo: denies heat/cold intolerance, excessive sweating      Vital Signs Last 24 Hrs  T(C): 36.9 (2020 11:00), Max: 37.3 (2020 20:00)  T(F): 98.4 (2020 11:00), Max: 99.1 (2020 20:00)  HR: 95 (2020 12:02) (88 - 98)  BP: 120/68 (2020 11:20) (90/62 - 167/89)  BP(mean): 84 (2020 11:20) (72 - 106)  RR: 22 (2020 12:02) (18 - 24)  SpO2: 98% (2020 12:02) (98% - 100%)              PHYSICAL EXAM:  Gen: NAD  Skin: No rashes, bruises, or lesions  Head: Normocephalic, Atraumatic  Face: + orbital edema, no erythema, or fluctuance. Parotid glands soft without mass  Eyes: no scleral injection  Nose: Nares bilaterally patent, no discharge  Mouth: No Stridor / Drooling / Trismus.  Mucosa moist, tongue/uvula midline, oropharynx with ET tube in place. also noted to have minimal lip edema.   Neck: Flat, supple, no lymphadenopathy, trachea midline, no masses  Lymphatic: No lymphadenopathy  Resp: on vent    glidescope:  no laryngeal edema noted. Tongue base, posterior pharyngeal wall, vallecula, epiglottis, and subglottis appear normal. No erythema, edema, pooling of secretions, masses or lesions. No glottic/supraglottic edema. VC unable to visualized fully 2/2 ET tube.

## 2020-07-06 NOTE — PROGRESS NOTE ADULT - PROBLEM SELECTOR PLAN 1
-test BG Q6h  -Adjust NPH as follows:   6am: continue NPH 50 units sq   12pm: decrease NPH to 18 units sq  6pm: continue NPH 6 units sq   12am: continue NPH 12 units sq  hold NPH if Tube feeds off. Can give 50% of dose if BG less than 100mg/dl)  -continue Humalog moderate correction scale Q6h  -Please notify endocrine if any changes in steroid regimen or TF regimen so we can adjust insulin regimen accordingly.

## 2020-07-06 NOTE — PROGRESS NOTE ADULT - SUBJECTIVE AND OBJECTIVE BOX
[facility] ATTENDING -- ADDITIONAL PROGRESS NOTE    Nighttime rounds were performed -- please refer to earlier Progress Note for HPI details.    T(C): 37 (07-06-20 @ 19:00), Max: 37.1 (07-06-20 @ 15:00)  HR: 107 (07-06-20 @ 20:38) (91 - 107)  BP: 117/81 (07-06-20 @ 19:00) (102/71 - 134/93)  RR: 23 (07-06-20 @ 19:00) (20 - 24)  SpO2: 100% (07-06-20 @ 20:38) (97% - 100%)  Wt(kg): --    Relevant labwork and imaging reviewed.    Family continues their ambiguity and refusal to make appropriate decisions regarding her care.

## 2020-07-06 NOTE — PROGRESS NOTE ADULT - ASSESSMENT
ASSESSMENT:  Left frontal intracerebral hemorrhage, poor neurologic examination  Continued goals of care conversation, palliative and ethics involved.       NEURO:  In vegetative state   Neuro Q4h check  CT head 6/30: New increased attenuation involving the bilateral parasagittal and left lateral frontoparietal cortices. patient was never hypoxemic, and no seizures on EEG  Seizure management: Keppra, Vimpat, fosphenytoin  7/1 MRI brain: Infarcts in bifrontal, larger in area of prior bleed (left frontal), as well as left occipital  7/2: Dilantin level 9.2 from 7.4 after increasing dose, will repeat level in few days  7/3: Dilantin level 10.0  Continue Provigil for neurostimulation    EEG: Occasional sharp waves. D/C EEG   Pain management prn. 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    Trach and PEG being discussed with family. No decision as of yet   Activity: [] OOB as tolerated [] Bedrest [X] PT [X] OT [] PMNR      PULM:  Intubated since 5/29- Awaiting family decision on goals of care-, unclear if family will elect to trach yet.  H/O of COPD   CPAP  (has been on CPAP for 3 days)   Sats >96 %    mech vent- CPAP 5/5, no cuff leak, copious thin secretions. Given robinul. On decadron x 48 hours  plan for direct laryngoscopy at bedside 7/6 prior to extubation.       CV: Intermittently tachycardic  Keep -160  HTN: Continue norvasc   Sinus tachycardia, metoprolol  25 q6h  Statin for HLD        RENAL:  IVL. Monitor Is/Os  On cardura for urinary retention  Straight cath Q6      GI:  Diet: On Glucerna OGT   NPO for possible extubation   Continue Banana flakes and metamucil, LBM 7/4  GI prophylaxis [] not indicated [x] PPI. Protonix is home med.      ENDO: S/P hyperglycemia - IR DM  Endocrine following for adjustment of NPH;   Continue PO synthroid  Continue prednisone 15mg, 5 mg at bedtime (was on it at home for RA) once decadron is discontinued 9 tomorrow)   Goal euglycemia (-180)  -Adjust NPH as follows: per endo   6am: c/w NPH 50 units sq   12pm: decrease NPH 24 units sq  6pm: c/w NPH 6 units sq   12am: c/w NPH 12 units sq      HEME/ONC:   On SQL for DVT ppx- 30mg SQ q12.  VTE prophylaxis: [X] SCDs [X] chemoprophylaxis [X] high risk of DVT/PE on admission due to: bed bound at baseline  LE doppler 5/30- No DVT    ID:  Afebrile   Ertapenem for ESBL found growing in urine culture on 6/4 - ended on  - 6/14      MISC: Awaiting family decision regarding trach/PEG vs palliation. Met with family/ethics and palliative care several times. will attempt to rediscuss GOC today     Access: Extended dwell catheter PIV placed R forearm.    CODE STATUS:  [X] Full Code     DISPOSITION:  [X] ICU    [X] Patient is at high risk of neurologic deterioration/death due to: resp failure, seizures, hemorrhage.     40 critical care time ASSESSMENT:  Left frontal intracerebral hemorrhage, poor neurologic examination  Continued goals of care conversation, palliative and ethics involved.       NEURO:  In vegetative state   Neuro Q4h check  CT head 6/30: New increased attenuation involving the bilateral parasagittal and left lateral frontoparietal cortices. patient was never hypoxemic, and no seizures on EEG  Seizure management: Keppra, Vimpat, fosphenytoin  7/1 MRI brain: Infarcts in bifrontal, larger in area of prior bleed (left frontal), as well as left occipital  7/2: Dilantin level 9.2 from 7.4 after increasing dose, will repeat level in few days  7/3: Dilantin level 10.0  Continue Provigil for neurostimulation    EEG: Occasional sharp waves. D/C EEG   Pain management prn. 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    Trach and PEG being discussed with family. No decision as of yet   Activity: [] OOB as tolerated [] Bedrest [X] PT [X] OT [] PMNR      PULM:  Intubated since 5/29- Awaiting family decision on goals of care-, unclear if family will elect to trach yet.  H/O of COPD   CPAP  (has been on CPAP for 3 days)   pull Et 1 cm   Sats >96 %    mech vent- CPAP 5/5, no cuff leak, copious thin secretions. Given robinul. On decadron x 48 hours  plan for direct laryngoscopy at bedside 7/6 prior to extubation.       CV: Intermittently tachycardic  Keep -160  HTN: Continue norvasc   Sinus tachycardia, metoprolol  25 q6h  Statin for HLD        RENAL:  IVL. Monitor Is/Os  On cardura for urinary retention  Straight cath Q6      GI:  Diet: On Glucerna OGT   resume TF   Continue Banana flakes and metamucil, LBM 7/4  GI prophylaxis [] not indicated [x] PPI. Protonix is home med.      ENDO: S/P hyperglycemia - IR DM  Endocrine following for adjustment of NPH;   Continue PO synthroid  Continue prednisone 15mg, 5 mg at bedtime (was on it at home for RA) once decadron is discontinued 9 tomorrow)   Goal euglycemia (-180)  -Adjust NPH as follows: per endo   6am: c/w NPH 50 units sq   12pm: decrease NPH 24 units sq  6pm: c/w NPH 6 units sq   12am: c/w NPH 12 units sq      HEME/ONC:   On SQL for DVT ppx- 30mg SQ q12.  VTE prophylaxis: [X] SCDs [X] chemoprophylaxis [X] high risk of DVT/PE on admission due to: bed bound at baseline  LE doppler 5/30- No DVT    ID:  Afebrile   Ertapenem for ESBL found growing in urine culture on 6/4 - ended on  - 6/14      MISC: Awaiting family decision regarding trach/PEG vs palliation. Met with family/ethics and palliative care several times. will attempt to rediscuss GOC today     Access: Extended dwell catheter PIV placed R forearm.    CODE STATUS:  [X] Full Code     DISPOSITION:  [X] ICU    [X] Patient is at high risk of neurologic deterioration/death due to: resp failure, seizures, hemorrhage.     40 critical care time

## 2020-07-06 NOTE — PROGRESS NOTE ADULT - PROBLEM SELECTOR PLAN 2
-Levothyroxine 150mcg per PEG daily  -Hold TF 1 hour prior and after levothyroxine given. Do not give with other medications.  -discussed with RN and NSICU resident  Jia Jules MD  Pager: [phone], between 9am-6pm  Nights and Weekends: [phone]

## 2020-07-06 NOTE — PROGRESS NOTE ADULT - SUBJECTIVE AND OBJECTIVE BOX
SUMMARY: 65 yo F with past medical hx of hypothyroidism, HTN, IDDM, COPD/CHRIS on home O2, RA (on Prednisone), SBO s/p ex lap with lysis of adhesions c/b evisceration,  recent hospitalization in 3/2020 with encephalopathy, significantly hypothyroid (, without myxedema coma), small (stable) right sided SDH, UTI, catatonic reaction to VPA (now resolved) and DKA and psychiatric issues, 5/2020 hospitalization for GIB, Emphysematous cystitis, endometritis, Sigmoid colitis, morbid obesity, functional quadriplegia presented on 5/30 with AMS and was found to have L frontal IPH. Per report, patient seized at home prior to presentation, but has not noted to be seizing during the hospitalization. Continued Goals of care discussion with family; palliative and ethics involved    HCP: Norris Rush, Son [phone]; Daughter, Heavenly Schmidt [phone]; son asks that only him or his sister, Heavenly be contacted for patient updates/information as they are both proxy's for patient (29 May 2020 23:41)    ADMISSION SCORES:  GCS: 5 [E2 VNT M3]  ICH: 2    HOSP COURSE:   5/29: DDAVP  6/9: Found to have LUE spasmodic movements. Aborted after holding LUE.   6/9: [facility] meeting with palliative care, ethics.  7/4-7/6: Remains with poor neuro exam, thin to moderate in line and oral secretions. Tolerated PS but no cuff leak. On decadron.       OVERNIGHT EVENTS: Afebrile      ICU Vital Signs Last 24 Hrs  T(C): 37.3 (05 Jul 2020 20:00), Max: 37.6 (05 Jul 2020 08:00)  T(F): 99.1 (05 Jul 2020 20:00), Max: 99.7 (05 Jul 2020 08:00)  HR: 93 (06 Jul 2020 04:40) (86 - 97)  BP: 134/93 (06 Jul 2020 04:00) (90/62 - 167/89)  BP(mean): 103 (06 Jul 2020 04:00) (72 - 106)  RR: 20 (06 Jul 2020 04:00) (18 - 24)  SpO2: 98% (06 Jul 2020 04:40) (98% - 100%)      07-04-20 @ 07:01  -  07-05-20 @ 07:00  --------------------------------------------------------  IN: 1525 mL / OUT: 1100 mL / NET: 425 mL    07-05-20 @ 07:01  -  07-06-20 @ 06:47  --------------------------------------------------------  IN: 1470 mL / OUT: 1200 mL / NET: 270 mL        Mode: CPAP with PS, FiO2: 30, PEEP: 5, PS: 5, MAP: 7, PIP: 10    acetaminophen    Suspension .. 650 milliGRAM(s) Oral every 6 hours PRN  amLODIPine   Tablet 10 milliGRAM(s) Oral daily  chlorhexidine 0.12% Liquid 15 milliLiter(s) Oral Mucosa every 12 hours  chlorhexidine 4% Liquid 1 Application(s) Topical <User Schedule>  dextrose 5%. 1000 milliLiter(s) (50 mL/Hr) IV Continuous <Continuous>  dextrose 50% Injectable 25 Gram(s) IV Push once  doxazosin 4 milliGRAM(s) Oral at bedtime  enoxaparin Injectable 30 milliGRAM(s) SubCutaneous two times a day  gabapentin 300 milliGRAM(s) Oral three times a day  glycopyrrolate Injectable 0.2 milliGRAM(s) IV Push three times a day PRN  insulin lispro (HumaLOG) corrective regimen sliding scale   SubCutaneous every 6 hours  insulin NPH human recombinant 24 Unit(s) SubCutaneous <User Schedule>  insulin NPH human recombinant 12 Unit(s) SubCutaneous <User Schedule>  insulin NPH human recombinant 50 Unit(s) SubCutaneous <User Schedule>  insulin NPH human recombinant 6 Unit(s) SubCutaneous <User Schedule>  lacosamide Solution 200 milliGRAM(s) Oral two times a day  levETIRAcetam  Solution 1000 milliGRAM(s) Oral two times a day  levothyroxine 150 MICROGram(s) Oral daily  metoprolol tartrate 25 milliGRAM(s) Oral every 6 hours  modafinil 200 milliGRAM(s) Oral daily  pantoprazole   Suspension 40 milliGRAM(s) Oral daily  phenytoin   Suspension 250 milliGRAM(s) Oral every 8 hours  simvastatin 20 milliGRAM(s) Oral at bedtime  sodium chloride 0.9% lock flush 10 milliLiter(s) IV Push every 1 hour PRN  traMADol 50 milliGRAM(s) Oral every 4 hours PRN      PHYSICAL EXAM:  Neurological: Intubated, EO to noxious stim, does not track, dysconjugate gaze, pupils 3mm reactive, +cough/gag, does not follow commands, upper extremities tremors when stimulate left UE spontaneous, withdrew LE    Pulmonary: No respiratory distress, diminished  Cardiovascular: S1/S2  Gastrointestinal: soft, non-distended, non-tender  Abd: soft, non-tender, nl bs ,+ skin lesion (surgical scar midline)  Extremities: Warm, dry  Skin: Skin tear on buttocks. Extensive bruising on extremities on trunk; present on admission        CULTURES:  Culture Results:   Normal Respiratory Perla present (06-29 @ 22:28)  Culture Results:   No Growth Final (06-29 @ 22:02)    RADIOLOGY & ADDITIONAL TESTS: SUMMARY: 65 yo F with past medical hx of hypothyroidism, HTN, IDDM, COPD/CHRIS on home O2, RA (on Prednisone), SBO s/p ex lap with lysis of adhesions c/b evisceration,  recent hospitalization in 3/2020 with encephalopathy, significantly hypothyroid (, without myxedema coma), small (stable) right sided SDH, UTI, catatonic reaction to VPA (now resolved) and DKA and psychiatric issues, 5/2020 hospitalization for GIB, Emphysematous cystitis, endometritis, Sigmoid colitis, morbid obesity, functional quadriplegia presented on 5/30 with AMS and was found to have L frontal IPH. Per report, patient seized at home prior to presentation, but has not noted to be seizing during the hospitalization. Continued Goals of care discussion with family; palliative and ethics involved    HCP: Norris Rush, Son [phone]; Daughter, Heavenly Schmidt [phone]; son asks that only him or his sister, Heavenly be contacted for patient updates/information as they are both proxy's for patient (29 May 2020 23:41)    ADMISSION SCORES:  GCS: 5 [E2 VNT M3]  ICH: 2    HOSP COURSE:   5/29: DDAVP  6/9: Found to have LUE spasmodic movements. Aborted after holding LUE.   6/9: [facility] meeting with palliative care, ethics.  7/4-7/6: Remains with poor neuro exam, thin to moderate in line and oral secretions. Tolerated PS but no cuff leak. On decadron.       OVERNIGHT EVENTS: Afebrile      ICU Vital Signs Last 24 Hrs  T(C): 37.3 (05 Jul 2020 20:00), Max: 37.6 (05 Jul 2020 08:00)  T(F): 99.1 (05 Jul 2020 20:00), Max: 99.7 (05 Jul 2020 08:00)  HR: 93 (06 Jul 2020 04:40) (86 - 97)  BP: 134/93 (06 Jul 2020 04:00) (90/62 - 167/89)  BP(mean): 103 (06 Jul 2020 04:00) (72 - 106)  RR: 20 (06 Jul 2020 04:00) (18 - 24)  SpO2: 98% (06 Jul 2020 04:40) (98% - 100%)      07-04-20 @ 07:01  -  07-05-20 @ 07:00  --------------------------------------------------------  IN: 1525 mL / OUT: 1100 mL / NET: 425 mL    07-05-20 @ 07:01  -  07-06-20 @ 06:47  --------------------------------------------------------  IN: 1470 mL / OUT: 1200 mL / NET: 270 mL        Mode: CPAP with PS, FiO2: 30, PEEP: 5, PS: 5, MAP: 7, PIP: 10    acetaminophen    Suspension .. 650 milliGRAM(s) Oral every 6 hours PRN  amLODIPine   Tablet 10 milliGRAM(s) Oral daily  chlorhexidine 0.12% Liquid 15 milliLiter(s) Oral Mucosa every 12 hours  chlorhexidine 4% Liquid 1 Application(s) Topical <User Schedule>  dextrose 5%. 1000 milliLiter(s) (50 mL/Hr) IV Continuous <Continuous>  dextrose 50% Injectable 25 Gram(s) IV Push once  doxazosin 4 milliGRAM(s) Oral at bedtime  enoxaparin Injectable 30 milliGRAM(s) SubCutaneous two times a day  gabapentin 300 milliGRAM(s) Oral three times a day  glycopyrrolate Injectable 0.2 milliGRAM(s) IV Push three times a day PRN  insulin lispro (HumaLOG) corrective regimen sliding scale   SubCutaneous every 6 hours  insulin NPH human recombinant 24 Unit(s) SubCutaneous <User Schedule>  insulin NPH human recombinant 12 Unit(s) SubCutaneous <User Schedule>  insulin NPH human recombinant 50 Unit(s) SubCutaneous <User Schedule>  insulin NPH human recombinant 6 Unit(s) SubCutaneous <User Schedule>  lacosamide Solution 200 milliGRAM(s) Oral two times a day  levETIRAcetam  Solution 1000 milliGRAM(s) Oral two times a day  levothyroxine 150 MICROGram(s) Oral daily  metoprolol tartrate 25 milliGRAM(s) Oral every 6 hours  modafinil 200 milliGRAM(s) Oral daily  pantoprazole   Suspension 40 milliGRAM(s) Oral daily  phenytoin   Suspension 250 milliGRAM(s) Oral every 8 hours  simvastatin 20 milliGRAM(s) Oral at bedtime  sodium chloride 0.9% lock flush 10 milliLiter(s) IV Push every 1 hour PRN  traMADol 50 milliGRAM(s) Oral every 4 hours PRN      PHYSICAL EXAM:  Neurological: Intubated, EO to noxious stim, does not track, dysconjugate gaze, pupils 3mm reactive, +cough/gag, does not follow commands, upper extremities tremors when stimulate left UE spontaneous, withdrew LE    Pulmonary: No respiratory distress, diminished  Cardiovascular: S1/S2  Gastrointestinal: soft, non-distended, non-tender  Abd: soft, non-tender, nl bs ,+ skin lesion (surgical scar midline)  Extremities: Warm, dry  Skin: Skin tear on buttocks. Extensive bruising on extremities on trunk; present on admission        CULTURES:  Culture Results:   Normal Respiratory Perla present (06-29 @ 22:28)  Culture Results:   No Growth Final (06-29 @ 22:02)    RADIOLOGY & ADDITIONAL TESTS:

## 2020-07-07 LAB
ANION GAP SERPL CALC-SCNC: 19 MMOL/L — HIGH (ref 5–17)
BUN SERPL-MCNC: 19 MG/DL — SIGNIFICANT CHANGE UP (ref 7–23)
CALCIUM SERPL-MCNC: 9.1 MG/DL — SIGNIFICANT CHANGE UP (ref 8.4–10.5)
CHLORIDE SERPL-SCNC: 103 MMOL/L — SIGNIFICANT CHANGE UP (ref 96–108)
CO2 SERPL-SCNC: 19 MMOL/L — LOW (ref 22–31)
CREAT SERPL-MCNC: 0.6 MG/DL — SIGNIFICANT CHANGE UP (ref 0.5–1.3)
GLUCOSE BLDC GLUCOMTR-MCNC: 177 MG/DL — HIGH (ref 70–99)
GLUCOSE BLDC GLUCOMTR-MCNC: 180 MG/DL — HIGH (ref 70–99)
GLUCOSE BLDC GLUCOMTR-MCNC: 216 MG/DL — HIGH (ref 70–99)
GLUCOSE BLDC GLUCOMTR-MCNC: 219 MG/DL — HIGH (ref 70–99)
GLUCOSE SERPL-MCNC: 178 MG/DL — HIGH (ref 70–99)
HCT VFR BLD CALC: 30.7 % — LOW (ref 34.5–45)
HGB BLD-MCNC: 9.2 G/DL — LOW (ref 11.5–15.5)
MAGNESIUM SERPL-MCNC: 2.1 MG/DL — SIGNIFICANT CHANGE UP (ref 1.6–2.6)
MCHC RBC-ENTMCNC: 28.9 PG — SIGNIFICANT CHANGE UP (ref 27–34)
MCHC RBC-ENTMCNC: 30 GM/DL — LOW (ref 32–36)
MCV RBC AUTO: 96.5 FL — SIGNIFICANT CHANGE UP (ref 80–100)
NRBC # BLD: 0 /100 WBCS — SIGNIFICANT CHANGE UP (ref 0–0)
PHENYTOIN FREE SERPL-MCNC: 14.9 UG/ML — SIGNIFICANT CHANGE UP (ref 10–20)
PHOSPHATE SERPL-MCNC: 3.4 MG/DL — SIGNIFICANT CHANGE UP (ref 2.5–4.5)
PLATELET # BLD AUTO: 401 K/UL — HIGH (ref 150–400)
POTASSIUM SERPL-MCNC: 4.7 MMOL/L — SIGNIFICANT CHANGE UP (ref 3.5–5.3)
POTASSIUM SERPL-SCNC: 4.7 MMOL/L — SIGNIFICANT CHANGE UP (ref 3.5–5.3)
RBC # BLD: 3.18 M/UL — LOW (ref 3.8–5.2)
RBC # FLD: 18.5 % — HIGH (ref 10.3–14.5)
SODIUM SERPL-SCNC: 141 MMOL/L — SIGNIFICANT CHANGE UP (ref 135–145)
WBC # BLD: 11.22 K/UL — HIGH (ref 3.8–10.5)
WBC # FLD AUTO: 11.22 K/UL — HIGH (ref 3.8–10.5)

## 2020-07-07 PROCEDURE — 71045 X-RAY EXAM CHEST 1 VIEW: CPT | Mod: 26

## 2020-07-07 PROCEDURE — 99232 SBSQ HOSP IP/OBS MODERATE 35: CPT

## 2020-07-07 PROCEDURE — 99291 CRITICAL CARE FIRST HOUR: CPT

## 2020-07-07 RX ORDER — DEXAMETHASONE 0.5 MG/5ML
4 ELIXIR ORAL EVERY 6 HOURS
Refills: 0 | Status: COMPLETED | OUTPATIENT
Start: 2020-07-07 | End: 2020-07-08

## 2020-07-07 RX ADMIN — Medication 4: at 23:07

## 2020-07-07 RX ADMIN — ENOXAPARIN SODIUM 30 MILLIGRAM(S): 100 INJECTION SUBCUTANEOUS at 05:21

## 2020-07-07 RX ADMIN — SIMVASTATIN 20 MILLIGRAM(S): 20 TABLET, FILM COATED ORAL at 21:10

## 2020-07-07 RX ADMIN — Medication 25 MILLIGRAM(S): at 23:07

## 2020-07-07 RX ADMIN — Medication 250 MILLIGRAM(S): at 05:30

## 2020-07-07 RX ADMIN — LEVETIRACETAM 1000 MILLIGRAM(S): 250 TABLET, FILM COATED ORAL at 05:22

## 2020-07-07 RX ADMIN — HUMAN INSULIN 12 UNIT(S): 100 INJECTION, SUSPENSION SUBCUTANEOUS at 23:07

## 2020-07-07 RX ADMIN — Medication 4 MILLIGRAM(S): at 23:08

## 2020-07-07 RX ADMIN — CHLORHEXIDINE GLUCONATE 15 MILLILITER(S): 213 SOLUTION TOPICAL at 05:23

## 2020-07-07 RX ADMIN — Medication 250 MILLIGRAM(S): at 21:10

## 2020-07-07 RX ADMIN — LACOSAMIDE 200 MILLIGRAM(S): 50 TABLET ORAL at 17:05

## 2020-07-07 RX ADMIN — ENOXAPARIN SODIUM 30 MILLIGRAM(S): 100 INJECTION SUBCUTANEOUS at 17:05

## 2020-07-07 RX ADMIN — Medication 25 MILLIGRAM(S): at 11:17

## 2020-07-07 RX ADMIN — Medication 25 MILLIGRAM(S): at 17:06

## 2020-07-07 RX ADMIN — Medication 4 MILLIGRAM(S): at 17:05

## 2020-07-07 RX ADMIN — CHLORHEXIDINE GLUCONATE 1 APPLICATION(S): 213 SOLUTION TOPICAL at 21:09

## 2020-07-07 RX ADMIN — GABAPENTIN 300 MILLIGRAM(S): 400 CAPSULE ORAL at 05:25

## 2020-07-07 RX ADMIN — ROBINUL 0.2 MILLIGRAM(S): 0.2 INJECTION INTRAMUSCULAR; INTRAVENOUS at 21:36

## 2020-07-07 RX ADMIN — Medication 2: at 05:21

## 2020-07-07 RX ADMIN — MODAFINIL 200 MILLIGRAM(S): 200 TABLET ORAL at 11:17

## 2020-07-07 RX ADMIN — Medication 2: at 17:06

## 2020-07-07 RX ADMIN — LEVETIRACETAM 1000 MILLIGRAM(S): 250 TABLET, FILM COATED ORAL at 17:05

## 2020-07-07 RX ADMIN — GABAPENTIN 300 MILLIGRAM(S): 400 CAPSULE ORAL at 13:10

## 2020-07-07 RX ADMIN — CHLORHEXIDINE GLUCONATE 15 MILLILITER(S): 213 SOLUTION TOPICAL at 17:05

## 2020-07-07 RX ADMIN — Medication 4: at 11:24

## 2020-07-07 RX ADMIN — Medication 4 MILLIGRAM(S): at 11:16

## 2020-07-07 RX ADMIN — PANTOPRAZOLE SODIUM 40 MILLIGRAM(S): 20 TABLET, DELAYED RELEASE ORAL at 11:16

## 2020-07-07 RX ADMIN — GABAPENTIN 300 MILLIGRAM(S): 400 CAPSULE ORAL at 21:10

## 2020-07-07 RX ADMIN — HUMAN INSULIN 6 UNIT(S): 100 INJECTION, SUSPENSION SUBCUTANEOUS at 17:06

## 2020-07-07 RX ADMIN — Medication 250 MILLIGRAM(S): at 13:09

## 2020-07-07 RX ADMIN — Medication 4 MILLIGRAM(S): at 21:09

## 2020-07-07 RX ADMIN — HUMAN INSULIN 50 UNIT(S): 100 INJECTION, SUSPENSION SUBCUTANEOUS at 05:21

## 2020-07-07 RX ADMIN — HUMAN INSULIN 18 UNIT(S): 100 INJECTION, SUSPENSION SUBCUTANEOUS at 11:23

## 2020-07-07 RX ADMIN — ROBINUL 0.2 MILLIGRAM(S): 0.2 INJECTION INTRAMUSCULAR; INTRAVENOUS at 03:15

## 2020-07-07 RX ADMIN — Medication 150 MICROGRAM(S): at 05:22

## 2020-07-07 RX ADMIN — LACOSAMIDE 200 MILLIGRAM(S): 50 TABLET ORAL at 05:22

## 2020-07-07 NOTE — PROGRESS NOTE ADULT - PROBLEM SELECTOR PLAN 1
-test BG Q6h  -Continue current NPH regimen:  6am: NPH 50 units sq   12pm: NPH to 18 units sq  6pm: NPH 6 units sq   12am: NPH 12 units sq  hold NPH if Tube feeds off. Can give 50% of dose if BG less than 100mg/dl)  -continue Humalog moderate correction scale Q6h  -Please notify endocrine if any changes in steroid regimen or TF regimen so we can adjust insulin regimen accordingly.

## 2020-07-07 NOTE — PROGRESS NOTE ADULT - SUBJECTIVE AND OBJECTIVE BOX
NSCU ATTENDING -- ADDITIONAL PROGRESS NOTE    Nighttime rounds were performed -- please refer to earlier Progress Note for HPI details.    T(C): 37.8 (07-07-20 @ 19:00), Max: 37.8 (07-07-20 @ 19:00)  HR: 104 (07-07-20 @ 21:23) (94 - 115)  BP: 139/83 (07-07-20 @ 19:00) (104/63 - 139/83)  RR: 24 (07-07-20 @ 19:00) (20 - 28)  SpO2: 96% (07-07-20 @ 21:23) (96% - 100%)  Wt(kg): --    Relevant labwork and imaging reviewed.    Per ethics, may initiate guardianship request if family still dithering in light of tortuous course thus far.

## 2020-07-07 NOTE — PROGRESS NOTE ADULT - SUBJECTIVE AND OBJECTIVE BOX
64 year old female admitted with change in mental status and possible seizure activity. Found on CT Scan to have left frontal intracerebral hemorrhage and cerebral edema which was not present on a February 20, 2020 CT Scan.  Patient intubated in neurosurgical ICU, no sedation.  Patient and family are well known to the Ethics Service (Feb/March 2019 and January 2020) with a past medical history of functional paraplegia (bedbound), hypothyroidism, pulmonary hypertension (HTN), Diabetes Mellitus (DM) on insulin, COPD/CHRIS (on nocturnal CPAP and 3L home oxygen), Rheumatoid Arthritis on chronic prednisone, chronic tremors, Small Bowel Obstruction s/p exploratory laparotomy with lysis of adhesions (2018) (4 retention sutures) complicated by evisceration secondary to a coughing episode s/p exploratory laparotomy(2018) (6 retention sutures) complicated by multifocal pneumonia, developed hypercapnic respiratory failure requiring intubation 1/6/19, s/p bronch 1/9/19, and extubated on 1/24/19, course further complicated by Enterococci bacteremia. Ethics requested prior to extubation in January 2019 to explore tracheostomy and son Norris stating that his mother was adamant about not wanting a tracheostomy, forcing extended utilization of endotracheal tube. Subsequently patient was successfully extubated and discharged to skilled nursing facility. Subsequent Ethics consult in January 2020 revolved around the patient needing long term antibiotics as well as the need for restraints due to combativeness.     Patient intubated for 38 days.  Family, to date, has not made a decision regarding tracheostomy/PEG vs. comfort care measures.  Patient without a cuff leak likely due to prolonged intubation and laryngeal edema despite administration of steroids  (decadron).      Bioethics Analysis from previous consultations remains unchanged.  Issues of patient autonomy resolved in the identification of the patient's daughter Heavenly Hernández, as surrogate decision-maker (though all children are privy to information).      With respect to beneficence/nonmaleficence, physicians are under no legal or ethical obligation to offer treatments that would 1) provide no medically indicated benefit or 2) impose unnecessary risk or burden to the patient. This concept of proportionality encompasses cardiopulmonary resuscitation (CPR) and other life sustaining treatments as well (intubation). Absence of a cuff leak indicates a high risk of complication or death following extubation (Wayne & Maggi, 2013).  As such, considering the proportionality (beneficence and non-maleficence) of leaving the endotracheal tube in place, or reintubating the patient, proves to also be problematic in this case.  If the endotracheal tube has reached the second standard (imposing unnecessary risk or burden to the patient), physician duty empowers the provider to discontinue that therapy in favor of therapies that promote beneficence.

## 2020-07-07 NOTE — PROGRESS NOTE ADULT - ASSESSMENT
As indicated, the bioethics analysis from the previous ethics consultations remain unchanged.  Ethics can be available for Family Meeting via phone or video conferencing.  Communication surrounding the objective medical facts about the risks and benefits of current therapy is key.  Physicians are not obligated to offer or continue therapy that 1) provides no medical benefit or 2) causes unnecessary burdens or harms to the patient.      Please call with questions or concerns.    Mega Quiroga DrPH(c), MPH, [facility]  Ethicist

## 2020-07-07 NOTE — PROGRESS NOTE ADULT - SUBJECTIVE AND OBJECTIVE BOX
Chief Complaint/Follow-up on:     Subjective:    MEDICATIONS  (STANDING):  amLODIPine   Tablet 10 milliGRAM(s) Oral daily  chlorhexidine 0.12% Liquid 15 milliLiter(s) Oral Mucosa every 12 hours  chlorhexidine 4% Liquid 1 Application(s) Topical <User Schedule>  dexAMETHasone  Injectable 4 milliGRAM(s) IV Push every 6 hours  dextrose 5%. 1000 milliLiter(s) (50 mL/Hr) IV Continuous <Continuous>  dextrose 50% Injectable 25 Gram(s) IV Push once  doxazosin 4 milliGRAM(s) Oral at bedtime  enoxaparin Injectable 30 milliGRAM(s) SubCutaneous two times a day  gabapentin   Solution 300 milliGRAM(s) Oral every 8 hours  insulin lispro (HumaLOG) corrective regimen sliding scale   SubCutaneous every 6 hours  insulin NPH human recombinant 12 Unit(s) SubCutaneous <User Schedule>  insulin NPH human recombinant 50 Unit(s) SubCutaneous <User Schedule>  insulin NPH human recombinant 6 Unit(s) SubCutaneous <User Schedule>  insulin NPH human recombinant 18 Unit(s) SubCutaneous <User Schedule>  lacosamide Solution 200 milliGRAM(s) Oral two times a day  levETIRAcetam  Solution 1000 milliGRAM(s) Oral two times a day  levothyroxine 150 MICROGram(s) Oral daily  metoprolol tartrate 25 milliGRAM(s) Oral every 6 hours  modafinil 200 milliGRAM(s) Oral daily  pantoprazole   Suspension 40 milliGRAM(s) Oral daily  phenytoin   Suspension 250 milliGRAM(s) Oral every 8 hours  simvastatin 20 milliGRAM(s) Oral at bedtime    MEDICATIONS  (PRN):  acetaminophen    Suspension .. 650 milliGRAM(s) Oral every 6 hours PRN Temp greater or equal to 38C (100.4F), Mild Pain (1 - 3)  glycopyrrolate Injectable 0.2 milliGRAM(s) IV Push three times a day PRN secretions  sodium chloride 0.9% lock flush 10 milliLiter(s) IV Push every 1 hour PRN Pre/post blood products, medications, blood draw, and to maintain line patency  traMADol 50 milliGRAM(s) Oral every 4 hours PRN Moderate Pain (4 - 6)      PHYSICAL EXAM:  VITALS: T(C): 37.5 (07-07-20 @ 15:00)  T(F): 99.5 (07-07-20 @ 15:00), Max: 99.5 (07-07-20 @ 15:00)  HR: 103 (07-07-20 @ 15:00) (99 - 115)  BP: 122/71 (07-07-20 @ 15:00) (102/71 - 131/78)  RR:  (20 - 28)  SpO2:  (96% - 100%)  Wt(kg): --  GENERAL: NAD, well-groomed, well-developed  EYES: No proptosis, no injection  HEENT:  Atraumatic, Normocephalic, moist mucous membranes  THYROID: Normal size, no palpable nodules  RESPIRATORY: Clear to auscultation bilaterally; No rales, rhonchi, wheezing, or rubs  CARDIOVASCULAR: Regular rate and rhythm; No murmurs; no peripheral edema  GI: Soft, nontender, non distended, normal bowel sounds  CUSHING'S SIGNS: no striae    POCT Blood Glucose.: 177 mg/dL (07-07-20 @ 17:03)  POCT Blood Glucose.: 216 mg/dL (07-07-20 @ 11:19)  POCT Blood Glucose.: 180 mg/dL (07-07-20 @ 05:17)  POCT Blood Glucose.: 136 mg/dL (07-06-20 @ 23:20)  POCT Blood Glucose.: 119 mg/dL (07-06-20 @ 21:19)  POCT Blood Glucose.: 165 mg/dL (07-06-20 @ 17:28)  POCT Blood Glucose.: 57 mg/dL (07-06-20 @ 17:08)  POCT Blood Glucose.: 62 mg/dL (07-06-20 @ 17:03)  POCT Blood Glucose.: 166 mg/dL (07-06-20 @ 11:19)  POCT Blood Glucose.: 188 mg/dL (07-06-20 @ 05:26)  POCT Blood Glucose.: 198 mg/dL (07-06-20 @ 00:18)  POCT Blood Glucose.: 184 mg/dL (07-05-20 @ 17:09)  POCT Blood Glucose.: 159 mg/dL (07-05-20 @ 15:10)  POCT Blood Glucose.: 162 mg/dL (07-05-20 @ 10:37)  POCT Blood Glucose.: 205 mg/dL (07-05-20 @ 05:49)  POCT Blood Glucose.: 190 mg/dL (07-05-20 @ 00:40)    07-07    141  |  103  |  19  ----------------------------<  178<H>  4.7   |  19<L>  |  0.60    EGFR if : 113  EGFR if non : 98    Ca    9.1      07-07  Mg     2.1     07-07  Phos  3.4     07-07            Thyroid Function Tests:  06-11 @ 13:58 TSH 8.26 FreeT4 -- T3 55 Anti TPO -- Anti Thyroglobulin Ab -- TSI -- Chief Complaint/Follow-up on: T2D    Subjective: Family still has not come to a decision about her care.      MEDICATIONS  (STANDING):  amLODIPine   Tablet 10 milliGRAM(s) Oral daily  chlorhexidine 0.12% Liquid 15 milliLiter(s) Oral Mucosa every 12 hours  chlorhexidine 4% Liquid 1 Application(s) Topical <User Schedule>  dexAMETHasone  Injectable 4 milliGRAM(s) IV Push every 6 hours  dextrose 5%. 1000 milliLiter(s) (50 mL/Hr) IV Continuous <Continuous>  dextrose 50% Injectable 25 Gram(s) IV Push once  doxazosin 4 milliGRAM(s) Oral at bedtime  enoxaparin Injectable 30 milliGRAM(s) SubCutaneous two times a day  gabapentin   Solution 300 milliGRAM(s) Oral every 8 hours  insulin lispro (HumaLOG) corrective regimen sliding scale   SubCutaneous every 6 hours  insulin NPH human recombinant 12 Unit(s) SubCutaneous <User Schedule>  insulin NPH human recombinant 50 Unit(s) SubCutaneous <User Schedule>  insulin NPH human recombinant 6 Unit(s) SubCutaneous <User Schedule>  insulin NPH human recombinant 18 Unit(s) SubCutaneous <User Schedule>  lacosamide Solution 200 milliGRAM(s) Oral two times a day  levETIRAcetam  Solution 1000 milliGRAM(s) Oral two times a day  levothyroxine 150 MICROGram(s) Oral daily  metoprolol tartrate 25 milliGRAM(s) Oral every 6 hours  modafinil 200 milliGRAM(s) Oral daily  pantoprazole   Suspension 40 milliGRAM(s) Oral daily  phenytoin   Suspension 250 milliGRAM(s) Oral every 8 hours  simvastatin 20 milliGRAM(s) Oral at bedtime    MEDICATIONS  (PRN):  acetaminophen    Suspension .. 650 milliGRAM(s) Oral every 6 hours PRN Temp greater or equal to 38C (100.4F), Mild Pain (1 - 3)  glycopyrrolate Injectable 0.2 milliGRAM(s) IV Push three times a day PRN secretions  sodium chloride 0.9% lock flush 10 milliLiter(s) IV Push every 1 hour PRN Pre/post blood products, medications, blood draw, and to maintain line patency  traMADol 50 milliGRAM(s) Oral every 4 hours PRN Moderate Pain (4 - 6)      PHYSICAL EXAM:  VITALS: T(C): 37.5 (07-07-20 @ 15:00)  T(F): 99.5 (07-07-20 @ 15:00), Max: 99.5 (07-07-20 @ 15:00)  HR: 103 (07-07-20 @ 15:00) (99 - 115)  BP: 122/71 (07-07-20 @ 15:00) (102/71 - 131/78)  RR:  (20 - 28)  SpO2:  (96% - 100%)  Wt(kg): --  GENERAL: intubated, sedated  RESPIRATORY: Clear to auscultation bilaterally; No rales, rhonchi, wheezing, or rubs  CARDIOVASCULAR: Regular rate and rhythm; No murmurs  GI: Soft, nontender, non distended, normal bowel sounds      POCT Blood Glucose.: 177 mg/dL (07-07-20 @ 17:03)  POCT Blood Glucose.: 216 mg/dL (07-07-20 @ 11:19)  POCT Blood Glucose.: 180 mg/dL (07-07-20 @ 05:17)  POCT Blood Glucose.: 136 mg/dL (07-06-20 @ 23:20)  POCT Blood Glucose.: 119 mg/dL (07-06-20 @ 21:19)  POCT Blood Glucose.: 165 mg/dL (07-06-20 @ 17:28)  POCT Blood Glucose.: 57 mg/dL (07-06-20 @ 17:08)  POCT Blood Glucose.: 62 mg/dL (07-06-20 @ 17:03)  POCT Blood Glucose.: 166 mg/dL (07-06-20 @ 11:19)  POCT Blood Glucose.: 188 mg/dL (07-06-20 @ 05:26)  POCT Blood Glucose.: 198 mg/dL (07-06-20 @ 00:18)  POCT Blood Glucose.: 184 mg/dL (07-05-20 @ 17:09)  POCT Blood Glucose.: 159 mg/dL (07-05-20 @ 15:10)  POCT Blood Glucose.: 162 mg/dL (07-05-20 @ 10:37)  POCT Blood Glucose.: 205 mg/dL (07-05-20 @ 05:49)  POCT Blood Glucose.: 190 mg/dL (07-05-20 @ 00:40)    07-07    141  |  103  |  19  ----------------------------<  178<H>  4.7   |  19<L>  |  0.60    EGFR if : 113  EGFR if non : 98    Ca    9.1      07-07  Mg     2.1     07-07  Phos  3.4     07-07            Thyroid Function Tests:  06-11 @ 13:58 TSH 8.26  T3 55

## 2020-07-07 NOTE — CHART NOTE - NSCHARTNOTEFT_GEN_A_CORE
Nutrition Follow Up Note     Patient seen for: nutrition follow up on ICU    Source: patient, medical record, communication with team.     Chart reviewed, events noted. 65 yo F with past medical hx of hypothyroidism, HTN, IDDM, COPD/CHRIS on home O2, RA (on Prednisone), SBO s/p ex lap with lysis of adhesions c/b evisceration,  recent hospitalization in 3/2020 with encephalopathy, significantly hypothyroid (, without myxedema coma), small (stable) right sided SDH, UTI, catatonic reaction to VPA (now resolved) and DKA and psychiatric issues, 5/2020 hospitalization for GIB, Emphysematous cystitis, endometritis, Sigmoid colitis, morbid obesity, functional quadriplegia presented on 5/30 with AMS and was found to have L frontal IPH.  Goals of care discussions ongoing; palliative care and ethics involved. Per MD note 7/7, pt in vegetative state    Diet Order: Diet, NPO with Tube Feed:   Tube Feeding Modality: Orogastric  Glucerna 1.2 Dewey (GLUCERNARTH)  Total Volume for 24 Hours (mL): 1560  Continuous  Starting Tube Feed Rate {mL per Hour}: 20  Increase Tube Feed Rate by (mL): 10     Every 4 hours  Until Goal Tube Feed Rate (mL per Hour): 65  Tube Feed Duration (in Hours): 24  Tube Feed Start Time: 23:00 (06-24-20 @ 21:38)    CURRENT EN ORDER PROVIDES:   - xxxx ml formula, xxxx calories (xx dewey/kg), xx grams protein (xx gm/kg)   - meets/does not meet nutrition needs    Nutrition Events:   - PO Intake:  - Enteral Nutrition:  -  -  - Nutrition Education:  - Handouts Provided:     Last BM (date). Bowel regimen: Miralax, senna    NGT/OGT output:     Ileostomy/Colostomy output:    Anthropometric Measurements:   Height (cm): 162.6 (05-30-20 @ 00:49), 152.4 (05-29-20 @ 19:18), 154.9 (05-12-20 @ 21:48)  Weight (kg): 99.6 (05-30-20 @ 00:49), 136.1 (05-29-20 @ 19:18), 98.2 (05-12-20 @ 23:57)  BMI (kg/m2): 37.7 (05-30-20 @ 00:49), 51.5 (05-30-20 @ 00:49), 58.6 (05-29-20 @ 19:18), 40.9 (05-12-20 @ 23:57)  IBW:       Medications: MEDICATIONS  (STANDING):  amLODIPine   Tablet 10 milliGRAM(s) Oral daily  chlorhexidine 0.12% Liquid 15 milliLiter(s) Oral Mucosa every 12 hours  chlorhexidine 4% Liquid 1 Application(s) Topical <User Schedule>  dextrose 5%. 1000 milliLiter(s) (50 mL/Hr) IV Continuous <Continuous>  dextrose 50% Injectable 25 Gram(s) IV Push once  doxazosin 4 milliGRAM(s) Oral at bedtime  enoxaparin Injectable 30 milliGRAM(s) SubCutaneous two times a day  gabapentin   Solution 300 milliGRAM(s) Oral every 8 hours  insulin lispro (HumaLOG) corrective regimen sliding scale   SubCutaneous every 6 hours  insulin NPH human recombinant 12 Unit(s) SubCutaneous <User Schedule>  insulin NPH human recombinant 50 Unit(s) SubCutaneous <User Schedule>  insulin NPH human recombinant 6 Unit(s) SubCutaneous <User Schedule>  insulin NPH human recombinant 18 Unit(s) SubCutaneous <User Schedule>  lacosamide Solution 200 milliGRAM(s) Oral two times a day  levETIRAcetam  Solution 1000 milliGRAM(s) Oral two times a day  levothyroxine 150 MICROGram(s) Oral daily  metoprolol tartrate 25 milliGRAM(s) Oral every 6 hours  modafinil 200 milliGRAM(s) Oral daily  pantoprazole   Suspension 40 milliGRAM(s) Oral daily  phenytoin   Suspension 250 milliGRAM(s) Oral every 8 hours  simvastatin 20 milliGRAM(s) Oral at bedtime    MEDICATIONS  (PRN):  acetaminophen    Suspension .. 650 milliGRAM(s) Oral every 6 hours PRN Temp greater or equal to 38C (100.4F), Mild Pain (1 - 3)  glycopyrrolate Injectable 0.2 milliGRAM(s) IV Push three times a day PRN secretions  sodium chloride 0.9% lock flush 10 milliLiter(s) IV Push every 1 hour PRN Pre/post blood products, medications, blood draw, and to maintain line patency  traMADol 50 milliGRAM(s) Oral every 4 hours PRN Moderate Pain (4 - 6)    Labs: 07-07 @ 04:53: Sodium 141, Potassium 4.7, Calcium 9.1, Magnesium 2.1, Phosphorus 3.4, BUN 19, Creatinine 0.60, Glucose 178<H>, Alk Phos --, ALT/SGPT --, AST/SGOT --, Albumin --, Prealbumin --, Total Bilirubin --, Hemoglobin 9.2<L>, Hematocrit 30.7<L>, Ferritin --, C-Reactive Protein --, Creatine Kinase <<27>        POCT Blood Glucose.: 180 mg/dL (07-07-20 @ 05:17)  POCT Blood Glucose.: 136 mg/dL (07-06-20 @ 23:20)  POCT Blood Glucose.: 119 mg/dL (07-06-20 @ 21:19)  POCT Blood Glucose.: 165 mg/dL (07-06-20 @ 17:28)  POCT Blood Glucose.: 57 mg/dL (07-06-20 @ 17:08)  POCT Blood Glucose.: 62 mg/dL (07-06-20 @ 17:03)  POCT Blood Glucose.: 166 mg/dL (07-06-20 @ 11:19)    Skin per nursing documentation: no pressure injuries documented  Edema:     Estimated Needs:   Energy:  Protein:    [place] Equation (REE):     Previous Nutrition Diagnosis:   Nutrition Diagnosis is:     New Nutrition Diagnosis:      Recommended Interventions:   1.   2.  3.  4.       Monitoring and Evaluation:   Continue to monitor nutrition provision and tolerance, weights, labs, skin integrity.   RD remains available upon request and will follow up per protocol.    Alison Kleiner RD, [facility] ([phone]) Nutrition Follow Up Note     Patient seen for: nutrition follow up on ICU    Source: patient, medical record, communication with team.     Chart reviewed, events noted. 65 yo F with past medical hx of hypothyroidism, HTN, IDDM, COPD/CHRIS on home O2, RA (on Prednisone), SBO s/p ex lap with lysis of adhesions c/b evisceration,  recent hospitalization in 3/2020 with encephalopathy, significantly hypothyroid (, without myxedema coma), small (stable) right sided SDH, UTI, catatonic reaction to VPA (now resolved) and DKA and psychiatric issues, 5/2020 hospitalization for GIB, Emphysematous cystitis, endometritis, Sigmoid colitis, morbid obesity, functional quadriplegia presented on 5/30 with AMS and was found to have L frontal IPH.  Goals of care discussions ongoing; palliative care and ethics involved. Intubated since 5/29. Per MD note 7/7, pt in vegetative state.     Diet Order: Diet, NPO with Tube Feed:   Tube Feeding Modality: Orogastric  Glucerna 1.2 Dewey (GLUCERNARTH)  Total Volume for 24 Hours (mL): 1560  Continuous  Starting Tube Feed Rate {mL per Hour}: 20  Increase Tube Feed Rate by (mL): 10     Every 4 hours  Until Goal Tube Feed Rate (mL per Hour): 65  Tube Feed Duration (in Hours): 24  Tube Feed Start Time: 23:00 (06-24-20 @ 21:38)    CURRENT EN ORDER PROVIDES: 1560ml, 1872kcal and 94g protein.     EN provision (per nursing flow sheet):   (7/7): 520ml (thus far)  (7/6): 1560ml (100% of goal)  (7/5): 875ml (56% of goal)  (7/4): 1495ml (96% of goal)  (7/3): 1138ml (73% of goal)    Nutrition Events:   -Pt s/p hyperglycemia; endocrine following for adjustment of NPH.   -Pt continues on PO synthroid. Instructions noted to stop tube feeds 1 hr before and 1 hr after administration of same.     Last BM (7/6): x 1; (7/5): x 1; (7/3): x 1; (7/2): x 1. Prescribed Banatrol BID; Protonix.     Anthropometric Measurements:   Height (cm): 162.6 (05-30-20 @ 00:49), 152.4 (05-29-20 @ 19:18), 154.9 (05-12-20 @ 21:48)  Weight (kg): 99.6 (05-30-20 @ 00:49), 136.1 (05-29-20 @ 19:18), 98.2 (05-12-20 @ 23:57)  BMI (kg/m2): 37.7 (05-30-20 @ 00:49), 51.5 (05-30-20 @ 00:49), 58.6 (05-29-20 @ 19:18), 40.9 (05-12-20 @ 23:57)    Medications: MEDICATIONS  (STANDING):  amLODIPine   Tablet 10 milliGRAM(s) Oral daily  chlorhexidine 0.12% Liquid 15 milliLiter(s) Oral Mucosa every 12 hours  chlorhexidine 4% Liquid 1 Application(s) Topical <User Schedule>  dextrose 5%. 1000 milliLiter(s) (50 mL/Hr) IV Continuous <Continuous>  dextrose 50% Injectable 25 Gram(s) IV Push once  doxazosin 4 milliGRAM(s) Oral at bedtime  enoxaparin Injectable 30 milliGRAM(s) SubCutaneous two times a day  gabapentin   Solution 300 milliGRAM(s) Oral every 8 hours  insulin lispro (HumaLOG) corrective regimen sliding scale   SubCutaneous every 6 hours  insulin NPH human recombinant 12 Unit(s) SubCutaneous <User Schedule>  insulin NPH human recombinant 50 Unit(s) SubCutaneous <User Schedule>  insulin NPH human recombinant 6 Unit(s) SubCutaneous <User Schedule>  insulin NPH human recombinant 18 Unit(s) SubCutaneous <User Schedule>  lacosamide Solution 200 milliGRAM(s) Oral two times a day  levETIRAcetam  Solution 1000 milliGRAM(s) Oral two times a day  levothyroxine 150 MICROGram(s) Oral daily  metoprolol tartrate 25 milliGRAM(s) Oral every 6 hours  modafinil 200 milliGRAM(s) Oral daily  pantoprazole   Suspension 40 milliGRAM(s) Oral daily  phenytoin   Suspension 250 milliGRAM(s) Oral every 8 hours  simvastatin 20 milliGRAM(s) Oral at bedtime    MEDICATIONS  (PRN):  acetaminophen    Suspension .. 650 milliGRAM(s) Oral every 6 hours PRN Temp greater or equal to 38C (100.4F), Mild Pain (1 - 3)  glycopyrrolate Injectable 0.2 milliGRAM(s) IV Push three times a day PRN secretions  sodium chloride 0.9% lock flush 10 milliLiter(s) IV Push every 1 hour PRN Pre/post blood products, medications, blood draw, and to maintain line patency  traMADol 50 milliGRAM(s) Oral every 4 hours PRN Moderate Pain (4 - 6)    Labs: 07-07 @ 04:53: Sodium 141, Potassium 4.7, Calcium 9.1, Magnesium 2.1, Phosphorus 3.4, BUN 19, Creatinine 0.60, Glucose 178<H>, Alk Phos --, ALT/SGPT --, AST/SGOT --, Albumin --, Prealbumin --, Total Bilirubin --, Hemoglobin 9.2<L>, Hematocrit 30.7<L>, Ferritin --, C-Reactive Protein --, Creatine Kinase <<27>    POCT Blood Glucose.: 180 mg/dL (07-07-20 @ 05:17)  POCT Blood Glucose.: 136 mg/dL (07-06-20 @ 23:20)  POCT Blood Glucose.: 119 mg/dL (07-06-20 @ 21:19)  POCT Blood Glucose.: 165 mg/dL (07-06-20 @ 17:28)  POCT Blood Glucose.: 57 mg/dL (07-06-20 @ 17:08)  POCT Blood Glucose.: 62 mg/dL (07-06-20 @ 17:03)  POCT Blood Glucose.: 166 mg/dL (07-06-20 @ 11:19)    Skin per nursing documentation: no pressure injuries documented; medial lower abdomen wound, right anterior ankle wound   Edema: 2+ generalized, 3+ left arm;  right arm;  left wrist;  right wrist;  left hand;  right hand    Estimated Needs: based on upper IBW 60 kg, with consideration for intubation and BMI>30  Energy: 1005-2142 calories (30-35 dewey/kg)  Protein: 84-96 grams (1.4-1.6 gm/kg)    Previous Nutrition Diagnosis: increased nutrient needs  Nutrition Diagnosis is: ongoing with provision of EN feeds    New Nutrition Diagnosis:      Recommended Interventions:   1. Continue Glucerna 1.2 @ 65 ml/hr x 24 hours to provide 1560 ml formula, 1872 calories (31 dewey/kg), 94 grams protein (1.6 gm/kg), based on upper IBW 60 kg  2. Monitor BMs and ongoing need for Banatrol  3. Determine nutritional goals of care    Monitoring and Evaluation:   Continue to monitor nutrition provision and tolerance, weights, labs, skin integrity.   RD remains available upon request and will follow up per protocol.    Alison Kleiner RD, [facility] ([phone])

## 2020-07-07 NOTE — PROGRESS NOTE ADULT - SUBJECTIVE AND OBJECTIVE BOX
SUMMARY: 63 yo F with past medical hx of hypothyroidism, HTN, IDDM, COPD/CHRIS on home O2, RA (on Prednisone), SBO s/p ex lap with lysis of adhesions c/b evisceration,  recent hospitalization in 3/2020 with encephalopathy, significantly hypothyroid (, without myxedema coma), small (stable) right sided SDH, UTI, catatonic reaction to VPA (now resolved) and DKA and psychiatric issues, 5/2020 hospitalization for GIB, Emphysematous cystitis, endometritis, Sigmoid colitis, morbid obesity, functional quadriplegia presented on 5/30 with AMS and was found to have L frontal IPH. Per report, patient seized at home prior to presentation, but has not noted to be seizing during the hospitalization. Continued Goals of care discussion with family; palliative and ethics involved    HCP: Norris Rush, Son [phone]; Daughter, Heavenly Schmidt [phone]; son asks that only him or his sister, Heavenly be contacted for patient updates/information as they are both proxy's for patient (29 May 2020 23:41)    ADMISSION SCORES:  GCS: 5 [E2 VNT M3]  ICH: 2    HOSP COURSE:   5/29: DDAVP  6/9: Found to have LUE spasmodic movements. Aborted after holding LUE.   6/9: [facility] meeting with palliative care, ethics.  7/4-7/6: Remains with poor neuro exam, thin to moderate in line and oral secretions. Tolerated PS but no cuff leak. On decadron.       OVERNIGHT EVENTS: Afebrile      ICU Vital Signs Last 24 Hrs  ICU Vital Signs Last 24 Hrs  T(C): 36.6 (07 Jul 2020 03:00), Max: 37.2 (06 Jul 2020 23:00)  T(F): 97.9 (07 Jul 2020 03:00), Max: 99 (06 Jul 2020 23:00)  HR: 109 (07 Jul 2020 06:00) (91 - 112)  BP: 104/63 (07 Jul 2020 05:00) (102/71 - 133/86)  BP(mean): 77 (07 Jul 2020 05:00) (77 - 99)  RR: 23 (07 Jul 2020 05:00) (20 - 24)  SpO2: 97% (07 Jul 2020 06:00) (96% - 100%)      07-05-20 @ 07:01  -  07-06-20 @ 07:00  --------------------------------------------------------  IN: 1470 mL / OUT: 1200 mL / NET: 270 mL    07-06-20 @ 07:01  -  07-07-20 @ 06:49  --------------------------------------------------------  IN: 1770 mL / OUT: 975 mL / NET: 795 mL        Mode: CPAP with PS, FiO2: 30, PEEP: 5, PS: 5, MAP: 7, PIP: 12    acetaminophen    Suspension .. 650 milliGRAM(s) Oral every 6 hours PRN  amLODIPine   Tablet 10 milliGRAM(s) Oral daily  chlorhexidine 0.12% Liquid 15 milliLiter(s) Oral Mucosa every 12 hours  chlorhexidine 4% Liquid 1 Application(s) Topical <User Schedule>  dextrose 5%. 1000 milliLiter(s) (50 mL/Hr) IV Continuous <Continuous>  dextrose 50% Injectable 25 Gram(s) IV Push once  doxazosin 4 milliGRAM(s) Oral at bedtime  enoxaparin Injectable 30 milliGRAM(s) SubCutaneous two times a day  gabapentin   Solution 300 milliGRAM(s) Oral every 8 hours  glycopyrrolate Injectable 0.2 milliGRAM(s) IV Push three times a day PRN  insulin lispro (HumaLOG) corrective regimen sliding scale   SubCutaneous every 6 hours  insulin NPH human recombinant 12 Unit(s) SubCutaneous <User Schedule>  insulin NPH human recombinant 50 Unit(s) SubCutaneous <User Schedule>  insulin NPH human recombinant 6 Unit(s) SubCutaneous <User Schedule>  insulin NPH human recombinant 18 Unit(s) SubCutaneous <User Schedule>  lacosamide Solution 200 milliGRAM(s) Oral two times a day  levETIRAcetam  Solution 1000 milliGRAM(s) Oral two times a day  levothyroxine 150 MICROGram(s) Oral daily  metoprolol tartrate 25 milliGRAM(s) Oral every 6 hours  modafinil 200 milliGRAM(s) Oral daily  pantoprazole   Suspension 40 milliGRAM(s) Oral daily  phenytoin   Suspension 250 milliGRAM(s) Oral every 8 hours  simvastatin 20 milliGRAM(s) Oral at bedtime  sodium chloride 0.9% lock flush 10 milliLiter(s) IV Push every 1 hour PRN  traMADol 50 milliGRAM(s) Oral every 4 hours PRN      LABS:  Na: 141 (07-07 @ 04:53)  K: 4.7 (07-07 @ 04:53)  Cl: 103 (07-07 @ 04:53)  CO2: 19 (07-07 @ 04:53)  BUN: 19 (07-07 @ 04:53)  Cr: 0.60 (07-07 @ 04:53)  Glu: 178(07-07 @ 04:53)    Hgb: 9.2 (07-07 @ 04:53)  Hct: 30.7 (07-07 @ 04:53)  WBC: 11.22 (07-07 @ 04:53)  Plt: 401 (07-07 @ 04:53)        PHYSICAL EXAM:  Neurological: Intubated, EO to noxious stim, does not track, dysconjugate gaze, pupils 3mm reactive, +cough/gag, does not follow commands, upper extremities tremors when stimulate left UE spontaneous, withdrew LE    Pulmonary: No respiratory distress, diminished  Cardiovascular: S1/S2  Gastrointestinal: soft, non-distended, non-tender  Abd: soft, non-tender, nl bs ,+ skin lesion (surgical scar midline)  Extremities: Warm, dry  Skin: Skin tear on buttocks. Extensive bruising on extremities on trunk; present on admission      CULTURES:  Culture Results:   Normal Respiratory Perla present (06-29 @ 22:28)  Culture Results:   No Growth Final (06-29 @ 22:02)    RADIOLOGY & ADDITIONAL TESTS:

## 2020-07-07 NOTE — PROGRESS NOTE ADULT - PROBLEM SELECTOR PLAN 2
-Levothyroxine 150mcg per PEG daily  -Hold TF 1 hour prior and after levothyroxine given. Do not give with other medications.  -discussed with NP Brianda Jules MD  Pager: [phone], between 9am-6pm  Nights and Weekends: [phone]

## 2020-07-07 NOTE — PROGRESS NOTE ADULT - ASSESSMENT
ASSESSMENT:  Left frontal intracerebral hemorrhage, poor neurologic examination  Continued goals of care conversation, palliative and ethics involved.       NEURO:  In vegetative state   Neuro Q4h check  CT head 6/30: New increased attenuation involving the bilateral parasagittal and left lateral frontoparietal cortices. patient was never hypoxemic, and no seizures on EEG  Seizure management: Keppra, Vimpat, fosphenytoin  7/1 MRI brain: Infarcts in bifrontal, larger in area of prior bleed (left frontal), as well as left occipital  7/2: Dilantin level 9.2 from 7.4 after increasing dose, will repeat level in few days  7/3: Dilantin level 10.0  7/6: Dilantin level 14  Continue Provigil for neurostimulation    EEG: Occasional sharp waves. D/Rob EEG   Pain management prn. 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    Trach and PEG being discussed with family. No decision as of yet   Activity: [] OOB as tolerated [] Bedrest [X] PT [X] OT [] PMNR      PULM:  Intubated since 5/29- Awaiting family decision on goals of care-, unclear if family will elect to trach yet.  H/O of COPD   CPAP  (has been on CPAP for 3 days)   Retracted ETT 1 cm 7/6. CXR  Sats >96 %    Mech vent- CPAP 5/5, no cuff leak, copious thin secretions. Given robinul. On decadron x 48 hours  Direct laryngoscopy at bedside 7/6, no laryngeal edema but copious secretions. Continue pulm toilet and CPAP as tolerated      CV: Intermittently tachycardic  Keep -160  HTN: Continue norvasc   Sinus tachycardia, metoprolol  25 q6h  Statin for HLD        RENAL:  IVL. Monitor Is/Os  On cardura for urinary retention  Straight cath Q6      GI:  Diet: On Glucerna OGT   resume TF   Continue Banana flakes and metamucil, LBM 7/4  GI prophylaxis [] not indicated [x] PPI. Protonix is home med.      ENDO: S/P hyperglycemia - IR DM  Endocrine following for adjustment of NPH;   Continue PO synthroid  Continue prednisone 15mg, 5 mg at bedtime (was on it at home for RA) once decadron is discontinued 9 tomorrow)   Goal euglycemia (-180)  -Adjust NPH as follows: per endo   6am: c/w NPH 50 units sq   12pm: decrease NPH 24 units sq  6pm: c/w NPH 6 units sq   12am: c/w NPH 12 units sq  Hypoglcemic on 7/6 50s. Dextrose am and NPH 6 units held. Monitor.       HEME/ONC:   On SQL for DVT ppx- 30mg SQ q12.  VTE prophylaxis: [X] SCDs [X] chemoprophylaxis [X] high risk of DVT/PE on admission due to: bed bound at baseline  LE doppler 5/30- No DVT    ID:  Afebrile   Ertapenem for ESBL found growing in urine culture on 6/4 - ended on  - 6/14      MISC: Awaiting family decision regarding trach/PEG vs palliation. Met with family/ethics and palliative care several times.  Discussed GOC again 7/6 with son, Juan Pablo. He will again think about trach/PEG but will discuss with remainder of the family. No decisions have been made as of yet.     Access: Extended dwell catheter PIV placed R forearm.    CODE STATUS:  [X] Full Code     DISPOSITION:  [X] ICU    [X] Patient is at high risk of neurologic deterioration/death due to: resp failure, seizures, hemorrhage.     40 critical care time ASSESSMENT:  Left frontal intracerebral hemorrhage, poor neurologic examination  Continued goals of care conversation, palliative and ethics involved.       NEURO:  In vegetative state   Neuro Q4h check  CT head 6/30: New increased attenuation involving the bilateral parasagittal and left lateral frontoparietal cortices. patient was never hypoxemic, and no seizures on EEG  Seizure management: Keppra, Vimpat, fosphenytoin  7/1 MRI brain: Infarcts in bifrontal, larger in area of prior bleed (left frontal), as well as left occipital  7/2: Dilantin level 9.2 from 7.4 after increasing dose, will repeat level in few days  7/3: Dilantin level 10.0  7/6: Dilantin level 14  Continue Provigil for neurostimulation    EEG: Occasional sharp waves. D/Rob EEG   Pain management prn. 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    Trach and PEG being discussed with family. No decision as of yet   Activity: [] OOB as tolerated [] Bedrest [X] PT [X] OT [] PMNR      PULM:  Intubated since 5/29- Awaiting family decision on goals of care-, unclear if family will elect to trach yet.  she has no cuff leak likely due to prolonged intubation and laryngeal edema. She remains not having a cuff leak despite decadron, at this point the safest way to place a tracheostomy, family refusing to make decisions regarding tarch or comfort measures, ethics are on consult, we will reconsult    repeat chest xray   continue decadron 4 mg q 6 hr for now till tomorrow    H/O of COPD   CPAP  (has been on CPAP for 3 days)   Retracted ETT 1 cm 7/6. CXR  Sats >96 %    Mech vent- CPAP 5/5, no cuff leak, copious thin secretions. Given robinul.   Direct laryngoscopy at bedside 7/6, no laryngeal edema but copious secretions. Continue pulm toilet and CPAP as tolerated      CV: Intermittently tachycardic  Keep -160  HTN: Continue norvasc   Sinus tachycardia, metoprolol  25 q6h  Statin for HLD    has no iv access, needs pic line       RENAL:  IVL. Monitor Is/Os  On cardura for urinary retention  Straight cath Q6      GI:  Diet: On Glucerna OGT   resume TF   Continue Banana flakes and metamucil, LBM 7/4  GI prophylaxis [] not indicated [x] PPI. Protonix is home med.      ENDO: S/P hyperglycemia - IR DM  Endocrine following for adjustment of NPH;   Continue PO synthroid  Continue prednisone 15mg, 5 mg at bedtime (was on it at home for RA) once decadron is discontinued 9 tomorrow)   Goal euglycemia (-180)  -Adjust NPH as follows: per endo   Hypoglycemic on 7/6 50s. Dextrose am and NPH 6 units held. Monitor.       HEME/ONC:   On SQL for DVT ppx- 30mg SQ q12.  VTE prophylaxis: [X] SCDs [X] chemoprophylaxis [X] high risk of DVT/PE on admission due to: bed bound at baseline  LE doppler 5/30- No DVT    ID:  Afebrile   Ertapenem for ESBL found growing in urine culture on 6/4 - ended on  - 6/14      MISC: Awaiting family decision regarding trach/PEG vs palliation. Met with family/ethics and palliative care several times.  Discussed GOC again 7/6 with son, Juan Pablo. He will again think about trach/PEG but will discuss with remainder of the family. No decisions have been made as of yet.     Access: Extended dwell catheter PIV placed R forearm.    CODE STATUS:  [X] Full Code     DISPOSITION:  [X] ICU    [X] Patient is at high risk of neurologic deterioration/death due to: resp failure, seizures, hemorrhage.     40 critical care time

## 2020-07-08 LAB
ANION GAP SERPL CALC-SCNC: 15 MMOL/L — SIGNIFICANT CHANGE UP (ref 5–17)
BUN SERPL-MCNC: 20 MG/DL — SIGNIFICANT CHANGE UP (ref 7–23)
CALCIUM SERPL-MCNC: 9.5 MG/DL — SIGNIFICANT CHANGE UP (ref 8.4–10.5)
CHLORIDE SERPL-SCNC: 101 MMOL/L — SIGNIFICANT CHANGE UP (ref 96–108)
CO2 SERPL-SCNC: 23 MMOL/L — SIGNIFICANT CHANGE UP (ref 22–31)
CREAT SERPL-MCNC: 0.56 MG/DL — SIGNIFICANT CHANGE UP (ref 0.5–1.3)
GLUCOSE BLDC GLUCOMTR-MCNC: 190 MG/DL — HIGH (ref 70–99)
GLUCOSE BLDC GLUCOMTR-MCNC: 216 MG/DL — HIGH (ref 70–99)
GLUCOSE BLDC GLUCOMTR-MCNC: 221 MG/DL — HIGH (ref 70–99)
GLUCOSE SERPL-MCNC: 133 MG/DL — HIGH (ref 70–99)
HCT VFR BLD CALC: 29.8 % — LOW (ref 34.5–45)
HGB BLD-MCNC: 9 G/DL — LOW (ref 11.5–15.5)
MAGNESIUM SERPL-MCNC: 2 MG/DL — SIGNIFICANT CHANGE UP (ref 1.6–2.6)
MCHC RBC-ENTMCNC: 28.6 PG — SIGNIFICANT CHANGE UP (ref 27–34)
MCHC RBC-ENTMCNC: 30.2 GM/DL — LOW (ref 32–36)
MCV RBC AUTO: 94.6 FL — SIGNIFICANT CHANGE UP (ref 80–100)
NRBC # BLD: 0 /100 WBCS — SIGNIFICANT CHANGE UP (ref 0–0)
PHOSPHATE SERPL-MCNC: 3.7 MG/DL — SIGNIFICANT CHANGE UP (ref 2.5–4.5)
PLATELET # BLD AUTO: 428 K/UL — HIGH (ref 150–400)
POTASSIUM SERPL-MCNC: 4.1 MMOL/L — SIGNIFICANT CHANGE UP (ref 3.5–5.3)
POTASSIUM SERPL-SCNC: 4.1 MMOL/L — SIGNIFICANT CHANGE UP (ref 3.5–5.3)
RBC # BLD: 3.15 M/UL — LOW (ref 3.8–5.2)
RBC # FLD: 17.9 % — HIGH (ref 10.3–14.5)
SARS-COV-2 RNA SPEC QL NAA+PROBE: SIGNIFICANT CHANGE UP
SODIUM SERPL-SCNC: 139 MMOL/L — SIGNIFICANT CHANGE UP (ref 135–145)
WBC # BLD: 11.83 K/UL — HIGH (ref 3.8–10.5)
WBC # FLD AUTO: 11.83 K/UL — HIGH (ref 3.8–10.5)

## 2020-07-08 PROCEDURE — 99232 SBSQ HOSP IP/OBS MODERATE 35: CPT

## 2020-07-08 PROCEDURE — 99291 CRITICAL CARE FIRST HOUR: CPT

## 2020-07-08 RX ORDER — HUMAN INSULIN 100 [IU]/ML
8 INJECTION, SUSPENSION SUBCUTANEOUS
Refills: 0 | Status: DISCONTINUED | OUTPATIENT
Start: 2020-07-08 | End: 2020-07-11

## 2020-07-08 RX ADMIN — SIMVASTATIN 20 MILLIGRAM(S): 20 TABLET, FILM COATED ORAL at 21:53

## 2020-07-08 RX ADMIN — CHLORHEXIDINE GLUCONATE 15 MILLILITER(S): 213 SOLUTION TOPICAL at 05:42

## 2020-07-08 RX ADMIN — Medication 25 MILLIGRAM(S): at 17:20

## 2020-07-08 RX ADMIN — Medication 250 MILLIGRAM(S): at 05:42

## 2020-07-08 RX ADMIN — AMLODIPINE BESYLATE 10 MILLIGRAM(S): 2.5 TABLET ORAL at 05:43

## 2020-07-08 RX ADMIN — CHLORHEXIDINE GLUCONATE 15 MILLILITER(S): 213 SOLUTION TOPICAL at 17:20

## 2020-07-08 RX ADMIN — HUMAN INSULIN 8 UNIT(S): 100 INJECTION, SUSPENSION SUBCUTANEOUS at 17:24

## 2020-07-08 RX ADMIN — ENOXAPARIN SODIUM 30 MILLIGRAM(S): 100 INJECTION SUBCUTANEOUS at 17:20

## 2020-07-08 RX ADMIN — Medication 250 MILLIGRAM(S): at 13:06

## 2020-07-08 RX ADMIN — PANTOPRAZOLE SODIUM 40 MILLIGRAM(S): 20 TABLET, DELAYED RELEASE ORAL at 11:08

## 2020-07-08 RX ADMIN — LACOSAMIDE 200 MILLIGRAM(S): 50 TABLET ORAL at 05:46

## 2020-07-08 RX ADMIN — TRAMADOL HYDROCHLORIDE 50 MILLIGRAM(S): 50 TABLET ORAL at 11:55

## 2020-07-08 RX ADMIN — GABAPENTIN 300 MILLIGRAM(S): 400 CAPSULE ORAL at 21:53

## 2020-07-08 RX ADMIN — Medication 4 MILLIGRAM(S): at 21:53

## 2020-07-08 RX ADMIN — GABAPENTIN 300 MILLIGRAM(S): 400 CAPSULE ORAL at 13:06

## 2020-07-08 RX ADMIN — MODAFINIL 200 MILLIGRAM(S): 200 TABLET ORAL at 11:07

## 2020-07-08 RX ADMIN — ENOXAPARIN SODIUM 30 MILLIGRAM(S): 100 INJECTION SUBCUTANEOUS at 05:41

## 2020-07-08 RX ADMIN — Medication 25 MILLIGRAM(S): at 05:43

## 2020-07-08 RX ADMIN — LEVETIRACETAM 1000 MILLIGRAM(S): 250 TABLET, FILM COATED ORAL at 17:27

## 2020-07-08 RX ADMIN — Medication 150 MICROGRAM(S): at 05:43

## 2020-07-08 RX ADMIN — CHLORHEXIDINE GLUCONATE 1 APPLICATION(S): 213 SOLUTION TOPICAL at 21:53

## 2020-07-08 RX ADMIN — GABAPENTIN 300 MILLIGRAM(S): 400 CAPSULE ORAL at 05:41

## 2020-07-08 RX ADMIN — Medication 25 MILLIGRAM(S): at 11:07

## 2020-07-08 RX ADMIN — Medication 4: at 05:44

## 2020-07-08 RX ADMIN — Medication 250 MILLIGRAM(S): at 21:54

## 2020-07-08 RX ADMIN — HUMAN INSULIN 50 UNIT(S): 100 INJECTION, SUSPENSION SUBCUTANEOUS at 05:43

## 2020-07-08 RX ADMIN — Medication 2: at 17:24

## 2020-07-08 RX ADMIN — Medication 4: at 11:08

## 2020-07-08 RX ADMIN — LACOSAMIDE 200 MILLIGRAM(S): 50 TABLET ORAL at 17:24

## 2020-07-08 RX ADMIN — LEVETIRACETAM 1000 MILLIGRAM(S): 250 TABLET, FILM COATED ORAL at 05:43

## 2020-07-08 NOTE — PROGRESS NOTE ADULT - ASSESSMENT
65 yo F with uncontrolled T2DM with no known complications, hypothyroidism/ul HTN/ COPD and RA on chronic steroids here with mental status changes associated with shaking found to have a left frontal parenchymal hematoma with mass effect on the left lateral ventricle and intraventricular extension with blood layering in the occipital horns s/p intubation on TF, awaiting family GOC decisions. BG values above goal after receiving decadron doses. BG goal (100-200mg/dl).

## 2020-07-08 NOTE — PROGRESS NOTE ADULT - PROBLEM SELECTOR PLAN 1
-test BG Q6h  -Adjust  NPH regimen:  6am: c/w NPH 50 units sq   12pm: c/w NPH to 18 units sq  6pm: Increase NPH 8 units sq   12am: c/w NPH 12 units sq  hold NPH if Tube feeds off. Can give 50% of dose if BG less than 100mg/dl)  -continue Humalog moderate correction scale Q6h  -Please notify endocrine if any changes in steroid regimen or TF regimen so we can adjust insulin regimen accordingly.

## 2020-07-08 NOTE — PROGRESS NOTE ADULT - SUBJECTIVE AND OBJECTIVE BOX
Diabetes Follow up note:    Chief complaint: T2DM/steroid induced hyperglycemia    Interval Hx: Pt received Decadron 4mg x 4 doses yesterday. BG values 170s-low 200s over past 24 hours. Remains intubated still awaiting family decision. Tolerating TF @ goal per RN.     Review of Systems:  unresponsive.     MEDS:  insulin lispro (HumaLOG) corrective regimen sliding scale   SubCutaneous every 6 hours  insulin NPH human recombinant 12 Unit(s) SubCutaneous <User Schedule>  insulin NPH human recombinant 50 Unit(s) SubCutaneous <User Schedule>  insulin NPH human recombinant 6 Unit(s) SubCutaneous <User Schedule>  insulin NPH human recombinant 18 Unit(s) SubCutaneous <User Schedule>  levothyroxine 150 MICROGram(s) Oral daily  simvastatin 20 milliGRAM(s) Oral at bedtime      Allergies    Depakote (Other)  Seroquel (Other (Severe))        PE:  General: Female lying in bed. Intubated  Vital Signs Last 24 Hrs  T(C): 37.2 (08 Jul 2020 11:00), Max: 37.8 (07 Jul 2020 19:00)  T(F): 99 (08 Jul 2020 11:00), Max: 100 (07 Jul 2020 19:00)  HR: 97 (08 Jul 2020 11:00) (91 - 104)  BP: 127/82 (08 Jul 2020 11:00) (122/71 - 139/83)  BP(mean): 97 (08 Jul 2020 11:00) (85 - 108)  RR: 20 (08 Jul 2020 11:00) (20 - 26)  SpO2: 98% (08 Jul 2020 11:00) (95% - 100%)  HEENT: ETT in place. On vent.   Abd: Soft, NT,ND, Obese.   Extremities: Warm  Neuro: unresponsive     LABS:  POCT Blood Glucose.: 221 mg/dL (07-08-20 @ 11:05)  POCT Blood Glucose.: 216 mg/dL (07-08-20 @ 05:19)  POCT Blood Glucose.: 219 mg/dL (07-07-20 @ 23:01)  POCT Blood Glucose.: 177 mg/dL (07-07-20 @ 17:03)  POCT Blood Glucose.: 216 mg/dL (07-07-20 @ 11:19)  POCT Blood Glucose.: 180 mg/dL (07-07-20 @ 05:17)  POCT Blood Glucose.: 136 mg/dL (07-06-20 @ 23:20)  POCT Blood Glucose.: 119 mg/dL (07-06-20 @ 21:19)  POCT Blood Glucose.: 165 mg/dL (07-06-20 @ 17:28)  POCT Blood Glucose.: 57 mg/dL (07-06-20 @ 17:08)  POCT Blood Glucose.: 62 mg/dL (07-06-20 @ 17:03)  POCT Blood Glucose.: 166 mg/dL (07-06-20 @ 11:19)  POCT Blood Glucose.: 188 mg/dL (07-06-20 @ 05:26)  POCT Blood Glucose.: 198 mg/dL (07-06-20 @ 00:18)  POCT Blood Glucose.: 184 mg/dL (07-05-20 @ 17:09)  POCT Blood Glucose.: 159 mg/dL (07-05-20 @ 15:10)                            9.2    11.22 )-----------( 401      ( 07 Jul 2020 04:53 )             30.7       07-07    141  |  103  |  19  ----------------------------<  178<H>  4.7   |  19<L>  |  0.60    Ca    9.1      07 Jul 2020 04:53  Phos  3.4     07-07  Mg     2.1     07-07        Thyroid Function Tests:  06-11 @ 13:58 TSH 8.26 FreeT4 -- T3 55 Anti TPO -- Anti Thyroglobulin Ab -- TSI --      A1C with Estimated Average Glucose Result: 7.4 % (05-30-20 @ 04:43)  A1C with Estimated Average Glucose Result: 6.4 % (05-14-20 @ 16:23)  A1C with Estimated Average Glucose Result: 6.5 % (05-13-20 @ 08:24)  Hemoglobin A1C, Whole Blood: 9.0 % (01-28-20 @ 19:30)  Hemoglobin A1C, Whole Blood: 9.2 % (01-27-20 @ 17:36)          Contact number: bhavna [phone] or [phone]

## 2020-07-08 NOTE — PROGRESS NOTE ADULT - ASSESSMENT
As indicated, the bioethics analysis from the previous ethics consultations remain unchanged.  Ethics will continue to remain available for consultation and support for the patient, family, and team.  Communication surrounding the objective medical facts about the risks and benefits of current therapy is key.  Physicians are not obligated to offer or continue therapy that 1) provides no medical benefit or 2) causes unnecessary burdens or harms to the patient.      Please contact ethics with questions.    Mega Quiroga DrPH(c), MPH, [facility]  Ethicist

## 2020-07-08 NOTE — PROGRESS NOTE ADULT - PROBLEM SELECTOR PLAN 2
-Levothyroxine 150mcg per PEG daily  -Hold TF 1 hour prior and after levothyroxine given. Do not give with other medications.    discussed w/RN  pager: [phone]   cell: [phone]  office: [phone]

## 2020-07-08 NOTE — PROGRESS NOTE ADULT - ASSESSMENT
ASSESSMENT:  Left frontal intracerebral hemorrhage, poor neurologic examination  Continued goals of care conversation, palliative and ethics involved.       NEURO:  In vegetative state   Neuro Q4h check  CT head 6/30: New increased attenuation involving the bilateral parasagittal and left lateral frontoparietal cortices. patient was never hypoxemic, and no seizures on EEG  Seizure management: Keppra, Vimpat, fosphenytoin  7/1 MRI brain: Infarcts in bifrontal, larger in area of prior bleed (left frontal), as well as left occipital  7/2: Dilantin level 9.2 from 7.4 after increasing dose, will repeat level in few days  7/3: Dilantin level 10.0  7/6: Dilantin level 14  Continue Provigil for neurostimulation    EEG: Occasional sharp waves. D/Rob EEG   Pain management prn. 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    Trach and PEG being discussed with family. No decision as of yet   Activity: [] OOB as tolerated [] Bedrest [X] PT [X] OT [] PMNR      PULM:  Intubated since 5/29- Awaiting family decision on goals of care-, unclear if family will elect to trach yet.  she has no cuff leak likely due to prolonged intubation and laryngeal edema. She remains not having a cuff leak despite decadron, at this point the safest way to place a tracheostomy, family refusing to make decisions regarding tarch or comfort measures, ethics are on consult, we will reconsult    repeat chest xray   continue decadron 4 mg q 6 hr for now till tomorrow    H/O of COPD   CPAP  (has been on CPAP for 3 days)   Retracted ETT 1 cm 7/6. CXR  Sats >96 %    Mech vent- CPAP 5/5, no cuff leak, copious thin secretions. Given robinul.   Direct laryngoscopy at bedside 7/6, no laryngeal edema but copious secretions. Continue pulm toilet and CPAP as tolerated      CV: Intermittently tachycardic  Keep -160  HTN: Continue norvasc   Sinus tachycardia, metoprolol  25 q6h  Statin for HLD    has no iv access, needs pic line       RENAL:  IVL. Monitor Is/Os  On cardura for urinary retention  Straight cath Q6      GI:  Diet: On Glucerna OGT   resume TF   Continue Banana flakes and metamucil, LBM 7/4  GI prophylaxis [] not indicated [x] PPI. Protonix is home med.      ENDO: S/P hyperglycemia - IR DM  Endocrine following for adjustment of NPH;   Continue PO synthroid  Continue prednisone 15mg, 5 mg at bedtime (was on it at home for RA) once decadron is discontinued 9 tomorrow)   Goal euglycemia (-180)  -Adjust NPH as follows: per endo   Hypoglycemic on 7/6 50s. Dextrose am and NPH 6 units held. Monitor.       HEME/ONC:   On SQL for DVT ppx- 30mg SQ q12.  VTE prophylaxis: [X] SCDs [X] chemoprophylaxis [X] high risk of DVT/PE on admission due to: bed bound at baseline  LE doppler 5/30- No DVT    ID:  Afebrile   Ertapenem for ESBL found growing in urine culture on 6/4 - ended on  - 6/14      MISC: Awaiting family decision regarding trach/PEG vs palliation. Met with family/ethics and palliative care several times.  Discussed GOC again 7/6 with son, Juan Pablo. He will again think about trach/PEG but will discuss with remainder of the family. No decisions have been made as of yet.     Access: Extended dwell catheter PIV placed R forearm.    CODE STATUS:  [X] Full Code     DISPOSITION:  [X] ICU    [X] Patient is at high risk of neurologic deterioration/death due to: resp failure, seizures, hemorrhage.     40 critical care time ASSESSMENT:  Left frontal intracerebral hemorrhage, poor neurologic examination  Continued goals of care conversation, palliative and ethics involved.       NEURO:  In vegetative state   Neuro Q4h check  CT head 6/30: New increased attenuation involving the bilateral parasagittal and left lateral frontoparietal cortices. patient was never hypoxemic, and no seizures on EEG  Seizure management: Keppra, Vimpat, fosphenytoin  Continue Provigil for neurostimulation    Pain management prn. 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    Trach and PEG being discussed with family. No decision as of yet   Activity: [] OOB as tolerated [] Bedrest [X] PT [X] OT [] PMNR      PULM:  Intubated since 5/29- Awaiting family decision on goals of care-, unclear if family will elect to trach yet.  she has no cuff leak likely due to prolonged intubation and laryngeal edema. She remains not having a cuff leak despite decadron, at this point the safest way to place a tracheostomy, family refusing to make decisions regarding tarch or comfort measures, ethics are on consult, we will reconsult    H/O of COPD   CPAP  (has been on CPAP for 3 days)   Sats >96 %    Mech vent- CPAP 5/5, no cuff leak, copious thin secretions  Direct laryngoscopy at bedside 7/6, no laryngeal edema but copious secretions. Continue pulm toilet and CPAP as tolerated    CV: Intermittently tachycardic  Keep -160  HTN: Continue norvasc   Sinus tachycardia, metoprolol  25 q6h  Statin for HLD    has no iv access, needs pic line       RENAL:  IVL. Monitor Is/Os  On cardura for urinary retention  Straight cath Q6      GI:  Diet: On Glucerna OGT   resume TF   Continue Banana flakes and metamucil, LBM 7/4  GI prophylaxis [] not indicated [x] PPI. Protonix is home med.      ENDO: S/P hyperglycemia - IR DM  Endocrine following for adjustment of NPH;   Continue PO synthroid  Continue prednisone 15mg, 5 mg at bedtime (was on it at home for RA) once decadron is discontinued 9 tomorrow)   Goal euglycemia (-180)  -Adjust NPH as follows: per endo   Hypoglycemic on 7/6 50s. Dextrose am and NPH 6 units held. Monitor.       HEME/ONC:   On SQL for DVT ppx- 30mg SQ q12.  VTE prophylaxis: [X] SCDs [X] chemoprophylaxis [X] high risk of DVT/PE on admission due to: bed bound at baseline  LE doppler 5/30- No DVT    ID:  Afebrile   Ertapenem for ESBL found growing in urine culture on 6/4 - ended on  - 6/14      MISC: Awaiting family decision regarding trach/PEG vs palliation. Met with family/ethics and palliative care several times.  Discussed GOC again 7/6 with son, Juan Pablo. He will again think about trach/PEG but will discuss with remainder of the family. No decisions have been made as of yet.     Access: Extended dwell catheter PIV placed R forearm.    CODE STATUS:  [X] Full Code     DISPOSITION:  [X] ICU    [X] Patient is at high risk of neurologic deterioration/death due to: resp failure, seizures, hemorrhage.     40 critical care time

## 2020-07-08 NOTE — PROGRESS NOTE ADULT - SUBJECTIVE AND OBJECTIVE BOX
64 year old female admitted with change in mental status and possible seizure activity. Found on CT Scan to have left frontal intracerebral hemorrhage and cerebral edema which was not present on a February 20, 2020 CT Scan.  Patient intubated in neurosurgical ICU, no sedation.  Patient and family are well known to the Ethics Service (Feb/March 2019 and January 2020) with a past medical history of functional paraplegia (bedbound), hypothyroidism, pulmonary hypertension (HTN), Diabetes Mellitus (DM) on insulin, COPD/CHRIS (on nocturnal CPAP and 3L home oxygen), Rheumatoid Arthritis on chronic prednisone, chronic tremors, Small Bowel Obstruction s/p exploratory laparotomy with lysis of adhesions (2018) (4 retention sutures) complicated by evisceration secondary to a coughing episode s/p exploratory laparotomy(2018) (6 retention sutures) complicated by multifocal pneumonia, developed hypercapnic respiratory failure requiring intubation 1/6/19, s/p bronch 1/9/19, and extubated on 1/24/19, course further complicated by Enterococci bacteremia. Ethics requested prior to extubation in January 2019 to explore tracheostomy and son Norris stating that his mother was adamant about not wanting a tracheostomy, forcing extended utilization of endotracheal tube. Subsequently patient was successfully extubated and discharged to skilled nursing facility. Subsequent Ethics consult in January 2020 revolved around the patient needing long term antibiotics as well as the need for restraints due to combativeness.     Patient intubated for 39 days.  Family, to date, has not made a decision regarding tracheostomy/PEG vs. comfort care measures.  Patient without a cuff leak likely due to prolonged intubation and laryngeal edema despite administration of steroids  (decadron).      Family phone meeting took place at [id number].  NSICU team, social work, and ethics present.  Patient's son Norris and daughter Heavenly on phone call.  Discussed patient's clinical condition and recent findings (edema, no cuff leak, unresponsive to steroids).  Clarified questions about clinical condition and expressed the clinical need to remove the endotracheal tube as it is now causing unnecessary harm.  Provided opportunities for family to ask questions and express concerns.  Explored the two possible plans after removing the ETT: 1) tracheostomy and long-term care vs. 2) compassionate liberation from the ventilator with comfort care.  Validated the family's viewpoint about both choices being undesirable.  Norris and Heavenly to visit patient at bedside tomorrow (7/9/2020) ~noon.  Reemphasized that time and deferral is no longer a luxury due to the ETT staying in place.      Bioethics Analysis from previous consultations remains unchanged.  Issues of patient autonomy resolved in the identification of the patient's daughter Heavenly Hernández, as surrogate decision-maker (though all children are privy to information).      With respect to beneficence/nonmaleficence, physicians are under no legal or ethical obligation to offer treatments that would 1) provide no medically indicated benefit or 2) impose unnecessary risk or burden to the patient. This concept of proportionality encompasses cardiopulmonary resuscitation (CPR) and other life sustaining treatments as well (intubation). Absence of a cuff leak indicates a high risk of complication or death following extubation (Wayne & Maggi, 2013).  As such, considering the proportionality (beneficence and non-maleficence) of leaving the endotracheal tube in place, or reintubating the patient, proves to also be problematic in this case.  If the endotracheal tube has reached the second standard (imposing unnecessary risk or burden to the patient), physician duty empowers the provider to discontinue that therapy in favor of therapies that promote beneficence.

## 2020-07-08 NOTE — PROGRESS NOTE ADULT - SUBJECTIVE AND OBJECTIVE BOX
NSCU ATTENDING -- ADDITIONAL PROGRESS NOTE    Nighttime rounds were performed -- please refer to earlier Progress Note for HPI details.    T(C): 37.3 (07-08-20 @ 19:00), Max: 37.3 (07-08-20 @ 15:00)  HR: 98 (07-08-20 @ 21:18) (91 - 103)  BP: 104/67 (07-08-20 @ 19:00) (104/67 - 137/86)  RR: 18 (07-08-20 @ 19:00) (15 - 23)  SpO2: 100% (07-08-20 @ 21:18) (95% - 100%)  Wt(kg): --    Relevant labwork and imaging reviewed.    Patient remains critically ill.    Patient is in limbo with an edematous airway after being intubated for a grossly inappropriate length of time 2/2 family refusal to make appropriate decisions.  S/p family meeting today, once again.  No decisions regarding definitive care, once again.

## 2020-07-08 NOTE — PROGRESS NOTE ADULT - SUBJECTIVE AND OBJECTIVE BOX
SUMMARY: 65 yo F with past medical hx of hypothyroidism, HTN, IDDM, COPD/CHRIS on home O2, RA (on Prednisone), SBO s/p ex lap with lysis of adhesions c/b evisceration,  recent hospitalization in 3/2020 with encephalopathy, significantly hypothyroid (, without myxedema coma), small (stable) right sided SDH, UTI, catatonic reaction to VPA (now resolved) and DKA and psychiatric issues, 5/2020 hospitalization for GIB, Emphysematous cystitis, endometritis, Sigmoid colitis, morbid obesity, functional quadriplegia presented on 5/30 with AMS and was found to have L frontal IPH. Per report, patient seized at home prior to presentation, but has not noted to be seizing during the hospitalization. Continued Goals of care discussion with family; palliative and ethics involved    HCP: Norris Rush, Son [phone]; Daughter, Heavenly Schmidt [phone]; son asks that only him or his sister, Heavenly be contacted for patient updates/information as they are both proxy's for patient (29 May 2020 23:41)    ADMISSION SCORES:  GCS: 5 [E2 VNT M3]  ICH: 2    HOSP COURSE:   5/29: DDAVP  6/9: Found to have LUE spasmodic movements. Aborted after holding LUE.   6/9: [facility] meeting with palliative care, ethics.  7/4-7/6: Remains with poor neuro exam, thin to moderate in line and oral secretions. Tolerated PS but no cuff leak. On decadron.       OVERNIGHT EVENTS: Afebrile      ICU Vital Signs Last 24 Hrs    ICU Vital Signs Last 24 Hrs  T(C): 37.1 (08 Jul 2020 07:00), Max: 37.8 (07 Jul 2020 19:00)  T(F): 98.8 (08 Jul 2020 07:00), Max: 100 (07 Jul 2020 19:00)  HR: 98 (08 Jul 2020 07:00) (91 - 115)  BP: 122/99 (08 Jul 2020 07:00) (122/71 - 139/83)  BP(mean): 108 (08 Jul 2020 07:00) (84 - 108)  RR: 21 (08 Jul 2020 07:00) (21 - 28)  SpO2: 100% (08 Jul 2020 07:00) (95% - 100%)      07-07-20 @ 07:01  -  07-08-20 @ 07:00  --------------------------------------------------------  IN: 1700 mL / OUT: 1125 mL / NET: 575 mL      Mode: CPAP with PS, FiO2: 30, PEEP: 5, PS: 5, MAP: 7, PC: 81, PIP: 12  acetaminophen    Suspension .. 650 milliGRAM(s) Oral every 6 hours PRN  amLODIPine   Tablet 10 milliGRAM(s) Oral daily  chlorhexidine 0.12% Liquid 15 milliLiter(s) Oral Mucosa every 12 hours  chlorhexidine 4% Liquid 1 Application(s) Topical <User Schedule>  dextrose 5%. 1000 milliLiter(s) (50 mL/Hr) IV Continuous <Continuous>  dextrose 50% Injectable 25 Gram(s) IV Push once  doxazosin 4 milliGRAM(s) Oral at bedtime  enoxaparin Injectable 30 milliGRAM(s) SubCutaneous two times a day  gabapentin   Solution 300 milliGRAM(s) Oral every 8 hours  glycopyrrolate Injectable 0.2 milliGRAM(s) IV Push three times a day PRN  insulin lispro (HumaLOG) corrective regimen sliding scale   SubCutaneous every 6 hours  insulin NPH human recombinant 12 Unit(s) SubCutaneous <User Schedule>  insulin NPH human recombinant 50 Unit(s) SubCutaneous <User Schedule>  insulin NPH human recombinant 6 Unit(s) SubCutaneous <User Schedule>  insulin NPH human recombinant 18 Unit(s) SubCutaneous <User Schedule>  lacosamide Solution 200 milliGRAM(s) Oral two times a day  levETIRAcetam  Solution 1000 milliGRAM(s) Oral two times a day  levothyroxine 150 MICROGram(s) Oral daily  metoprolol tartrate 25 milliGRAM(s) Oral every 6 hours  modafinil 200 milliGRAM(s) Oral daily  pantoprazole   Suspension 40 milliGRAM(s) Oral daily  phenytoin   Suspension 250 milliGRAM(s) Oral every 8 hours  simvastatin 20 milliGRAM(s) Oral at bedtime  sodium chloride 0.9% lock flush 10 milliLiter(s) IV Push every 1 hour PRN  traMADol 50 milliGRAM(s) Oral every 4 hours PRN                          9.2    11.22 )-----------( 401      ( 07 Jul 2020 04:53 )             30.7     07-07    141  |  103  |  19  ----------------------------<  178<H>  4.7   |  19<L>  |  0.60    Ca    9.1      07 Jul 2020 04:53  Phos  3.4     07-07  Mg     2.1     07-07        PHYSICAL EXAM:  Neurological: Intubated, EO to noxious stim, does not track, dysconjugate gaze, pupils 3mm reactive, +cough/gag, does not follow commands, upper extremities tremors when stimulate left UE spontaneous, withdrew LE    Pulmonary: No respiratory distress, diminished  Cardiovascular: S1/S2  Gastrointestinal: soft, non-distended, non-tender  Abd: soft, non-tender, nl bs ,+ skin lesion (surgical scar midline)  Extremities: Warm, dry  Skin: Skin tear on buttocks. Extensive bruising on extremities on trunk; present on admission      CULTURES:  Culture Results:   Normal Respiratory Perla present (06-29 @ 22:28)  Culture Results:   No Growth Final (06-29 @ 22:02)    RADIOLOGY & ADDITIONAL TESTS:

## 2020-07-08 NOTE — CHART NOTE - NSCHARTNOTEFT_GEN_A_CORE
Meeting held 7/8 with family  Norris (son)Heavenly (daughter)  Present: Ethics attending (Mega Quiroga MD), Social work (Melanie), SW supervisor  Yas Purcell MD (fellow), Valerie Mixon ([facility] intensivist)    Discussed imminent harm from being intubated for 38 days and inability to safely liberate from the ventilator despite maximal medical therapy.   Summarized the long term disability that the patient faces given her neurologic insult in addition to her superimposed poor baseline.    Daughter voiced concern that comfort care may appear to cause suffering. This was explained in greater detail by MDs.   Norris has requested a list of vent facilities in the event that they decide on trach/PEG. SW to provide.    Family to discuss with remaining 6 siblings today and communicate this decision with medical team on 7/9/2020.

## 2020-07-09 LAB
GLUCOSE BLDC GLUCOMTR-MCNC: 133 MG/DL — HIGH (ref 70–99)
GLUCOSE BLDC GLUCOMTR-MCNC: 137 MG/DL — HIGH (ref 70–99)
GLUCOSE BLDC GLUCOMTR-MCNC: 221 MG/DL — HIGH (ref 70–99)
GLUCOSE BLDC GLUCOMTR-MCNC: 226 MG/DL — HIGH (ref 70–99)
GLUCOSE BLDC GLUCOMTR-MCNC: 233 MG/DL — HIGH (ref 70–99)

## 2020-07-09 PROCEDURE — 99291 CRITICAL CARE FIRST HOUR: CPT

## 2020-07-09 PROCEDURE — 99231 SBSQ HOSP IP/OBS SF/LOW 25: CPT

## 2020-07-09 PROCEDURE — 71045 X-RAY EXAM CHEST 1 VIEW: CPT | Mod: 26

## 2020-07-09 RX ORDER — HUMAN INSULIN 100 [IU]/ML
52 INJECTION, SUSPENSION SUBCUTANEOUS
Refills: 0 | Status: DISCONTINUED | OUTPATIENT
Start: 2020-07-10 | End: 2020-07-11

## 2020-07-09 RX ADMIN — LACOSAMIDE 200 MILLIGRAM(S): 50 TABLET ORAL at 17:41

## 2020-07-09 RX ADMIN — HUMAN INSULIN 8 UNIT(S): 100 INJECTION, SUSPENSION SUBCUTANEOUS at 18:55

## 2020-07-09 RX ADMIN — LEVETIRACETAM 1000 MILLIGRAM(S): 250 TABLET, FILM COATED ORAL at 17:34

## 2020-07-09 RX ADMIN — LEVETIRACETAM 1000 MILLIGRAM(S): 250 TABLET, FILM COATED ORAL at 05:13

## 2020-07-09 RX ADMIN — SIMVASTATIN 20 MILLIGRAM(S): 20 TABLET, FILM COATED ORAL at 23:05

## 2020-07-09 RX ADMIN — Medication 25 MILLIGRAM(S): at 23:08

## 2020-07-09 RX ADMIN — Medication 25 MILLIGRAM(S): at 05:14

## 2020-07-09 RX ADMIN — CHLORHEXIDINE GLUCONATE 15 MILLILITER(S): 213 SOLUTION TOPICAL at 17:34

## 2020-07-09 RX ADMIN — HUMAN INSULIN 12 UNIT(S): 100 INJECTION, SUSPENSION SUBCUTANEOUS at 00:22

## 2020-07-09 RX ADMIN — HUMAN INSULIN 50 UNIT(S): 100 INJECTION, SUSPENSION SUBCUTANEOUS at 05:16

## 2020-07-09 RX ADMIN — Medication 4: at 23:06

## 2020-07-09 RX ADMIN — ENOXAPARIN SODIUM 30 MILLIGRAM(S): 100 INJECTION SUBCUTANEOUS at 05:14

## 2020-07-09 RX ADMIN — MODAFINIL 200 MILLIGRAM(S): 200 TABLET ORAL at 11:21

## 2020-07-09 RX ADMIN — Medication 4 MILLIGRAM(S): at 23:05

## 2020-07-09 RX ADMIN — Medication 250 MILLIGRAM(S): at 15:20

## 2020-07-09 RX ADMIN — Medication 150 MICROGRAM(S): at 05:14

## 2020-07-09 RX ADMIN — Medication 250 MILLIGRAM(S): at 23:05

## 2020-07-09 RX ADMIN — GABAPENTIN 300 MILLIGRAM(S): 400 CAPSULE ORAL at 05:14

## 2020-07-09 RX ADMIN — Medication 25 MILLIGRAM(S): at 11:21

## 2020-07-09 RX ADMIN — Medication 250 MILLIGRAM(S): at 05:13

## 2020-07-09 RX ADMIN — GABAPENTIN 300 MILLIGRAM(S): 400 CAPSULE ORAL at 23:05

## 2020-07-09 RX ADMIN — HUMAN INSULIN 12 UNIT(S): 100 INJECTION, SUSPENSION SUBCUTANEOUS at 23:05

## 2020-07-09 RX ADMIN — GABAPENTIN 300 MILLIGRAM(S): 400 CAPSULE ORAL at 17:35

## 2020-07-09 RX ADMIN — LACOSAMIDE 200 MILLIGRAM(S): 50 TABLET ORAL at 05:14

## 2020-07-09 RX ADMIN — Medication 4: at 18:55

## 2020-07-09 RX ADMIN — CHLORHEXIDINE GLUCONATE 1 APPLICATION(S): 213 SOLUTION TOPICAL at 23:06

## 2020-07-09 RX ADMIN — CHLORHEXIDINE GLUCONATE 15 MILLILITER(S): 213 SOLUTION TOPICAL at 05:14

## 2020-07-09 RX ADMIN — Medication 25 MILLIGRAM(S): at 17:41

## 2020-07-09 RX ADMIN — ENOXAPARIN SODIUM 30 MILLIGRAM(S): 100 INJECTION SUBCUTANEOUS at 17:34

## 2020-07-09 RX ADMIN — AMLODIPINE BESYLATE 10 MILLIGRAM(S): 2.5 TABLET ORAL at 05:14

## 2020-07-09 RX ADMIN — Medication 4: at 12:11

## 2020-07-09 RX ADMIN — PANTOPRAZOLE SODIUM 40 MILLIGRAM(S): 20 TABLET, DELAYED RELEASE ORAL at 11:21

## 2020-07-09 RX ADMIN — TRAMADOL HYDROCHLORIDE 50 MILLIGRAM(S): 50 TABLET ORAL at 23:00

## 2020-07-09 NOTE — PROGRESS NOTE ADULT - SUBJECTIVE AND OBJECTIVE BOX
NSCU ATTENDING -- ADDITIONAL PROGRESS NOTE    Nighttime rounds were performed -- please refer to earlier Progress Note for HPI details.    T(C): 36.7 (07-09-20 @ 19:00), Max: 37.2 (07-08-20 @ 23:00)  HR: 111 (07-09-20 @ 20:58) (95 - 118)  BP: 132/92 (07-09-20 @ 19:00) (95/63 - 132/92)  RR: 24 (07-09-20 @ 19:00) (15 - 24)  SpO2: 97% (07-09-20 @ 20:58) (96% - 100%)  Wt(kg): --    Relevant labwork and imaging reviewed.    Patient remains critically ill.    Family still has not decided on definitive care.  "Final decision" tomorrow, supposedly. NSCU ATTENDING -- ADDITIONAL PROGRESS NOTE    Nighttime rounds were performed -- please refer to earlier Progress Note for HPI details.    T(C): 36.7 (07-09-20 @ 19:00), Max: 37.2 (07-08-20 @ 23:00)  HR: 111 (07-09-20 @ 20:58) (95 - 118)  BP: 132/92 (07-09-20 @ 19:00) (95/63 - 132/92)  RR: 24 (07-09-20 @ 19:00) (15 - 24)  SpO2: 97% (07-09-20 @ 20:58) (96% - 100%)  Wt(kg): --    Relevant labwork and imaging reviewed.    Family still has not decided on definitive care.  "Final decision" tomorrow, supposedly.

## 2020-07-09 NOTE — PROGRESS NOTE ADULT - PROBLEM SELECTOR PLAN 2
-Levothyroxine 150mcg per PEG daily  -Hold TF 1 hour prior and after levothyroxine given. Do not give with other medications.    call w/any questions  pager: [phone]   cell: [phone]  office: [phone].

## 2020-07-09 NOTE — PROGRESS NOTE ADULT - ASSESSMENT
65 yo F with uncontrolled T2DM with no known complications, hypothyroidism/ul HTN/ COPD and RA on chronic steroids here with mental status changes associated with shaking found to have a left frontal parenchymal hematoma with mass effect on the left lateral ventricle and intraventricular extension with blood layering in the occipital horns s/p intubation on TF, awaiting family GOC decisions. BG values stable on current NPH regimen. BG goal (100-200mg/dl).

## 2020-07-09 NOTE — PROGRESS NOTE ADULT - SUBJECTIVE AND OBJECTIVE BOX
Diabetes Follow up note:    Chief complaint: T2DM/steroid induced hyperglycemia.    Interval Hx: BG values close to goal/elevated at noontime daily. Tolerating feeds @ goal. Still awaiting family decision re trach/PEG. Remains in vegetative state.     Review of Systems:  unable to provide ROS.   MEDS:  insulin lispro (HumaLOG) corrective regimen sliding scale   SubCutaneous every 6 hours  insulin NPH human recombinant 12 Unit(s) SubCutaneous <User Schedule>  insulin NPH human recombinant 50 Unit(s) SubCutaneous <User Schedule>  insulin NPH human recombinant 18 Unit(s) SubCutaneous <User Schedule>  insulin NPH human recombinant 8 Unit(s) SubCutaneous <User Schedule>  levothyroxine 150 MICROGram(s) Oral daily  simvastatin 20 milliGRAM(s) Oral at bedtime      Allergies    Depakote (Other)  Seroquel (Other (Severe))          PE:  General: Female lying in bed. Intubated.   Vital Signs Last 24 Hrs  T(C): 37.1 (09 Jul 2020 08:00), Max: 37.3 (08 Jul 2020 15:00)  T(F): 98.8 (09 Jul 2020 08:00), Max: 99.1 (08 Jul 2020 15:00)  HR: 98 (09 Jul 2020 12:15) (95 - 108)  BP: 102/73 (09 Jul 2020 08:00) (102/73 - 135/73)  BP(mean): 83 (09 Jul 2020 08:00) (77 - 89)  RR: 24 (09 Jul 2020 12:00) (15 - 24)  SpO2: 97% (09 Jul 2020 12:15) (96% - 100%)  HEENT: ETT in place. On vent.   Abd: Soft, NT,ND, Obese.   Extremities: Warm  Neuro: unresponsive     LABS:  POCT Blood Glucose.: 226 mg/dL (07-09-20 @ 12:09)  POCT Blood Glucose.: 137 mg/dL (07-09-20 @ 04:58)  POCT Blood Glucose.: 133 mg/dL (07-09-20 @ 00:17)  POCT Blood Glucose.: 190 mg/dL (07-08-20 @ 17:07)  POCT Blood Glucose.: 221 mg/dL (07-08-20 @ 11:05)  POCT Blood Glucose.: 216 mg/dL (07-08-20 @ 05:19)  POCT Blood Glucose.: 219 mg/dL (07-07-20 @ 23:01)  POCT Blood Glucose.: 177 mg/dL (07-07-20 @ 17:03)  POCT Blood Glucose.: 216 mg/dL (07-07-20 @ 11:19)  POCT Blood Glucose.: 180 mg/dL (07-07-20 @ 05:17)  POCT Blood Glucose.: 136 mg/dL (07-06-20 @ 23:20)  POCT Blood Glucose.: 119 mg/dL (07-06-20 @ 21:19)  POCT Blood Glucose.: 165 mg/dL (07-06-20 @ 17:28)  POCT Blood Glucose.: 57 mg/dL (07-06-20 @ 17:08)  POCT Blood Glucose.: 62 mg/dL (07-06-20 @ 17:03)                            9.0    11.83 )-----------( 428      ( 08 Jul 2020 22:27 )             29.8       07-08    139  |  101  |  20  ----------------------------<  133<H>  4.1   |  23  |  0.56    Ca    9.5      08 Jul 2020 22:27  Phos  3.7     07-08  Mg     2.0     07-08        Thyroid Function Tests:  06-11 @ 13:58 TSH 8.26 FreeT4 -- T3 55 Anti TPO -- Anti Thyroglobulin Ab -- TSI --      A1C with Estimated Average Glucose Result: 7.4 % (05-30-20 @ 04:43)  A1C with Estimated Average Glucose Result: 6.4 % (05-14-20 @ 16:23)  A1C with Estimated Average Glucose Result: 6.5 % (05-13-20 @ 08:24)  Hemoglobin A1C, Whole Blood: 9.0 % (01-28-20 @ 19:30)  Hemoglobin A1C, Whole Blood: 9.2 % (01-27-20 @ 17:36)          Contact number: bhavna [phone] or [phone]

## 2020-07-09 NOTE — PROGRESS NOTE ADULT - ASSESSMENT
ASSESSMENT:  Left frontal intracerebral hemorrhage, poor neurologic examination  Continued goals of care conversation, palliative and ethics involved.       NEURO:  In vegetative state   Neuro Q4h check  CT head 6/30: New increased attenuation involving the bilateral parasagittal and left lateral frontoparietal cortices. patient was never hypoxemic, and no seizures on EEG  Seizure management: Keppra, Vimpat, fosphenytoin  Continue Provigil for neurostimulation    Pain management prn. 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    Trach and PEG being discussed with family. No decision as of yet   Activity: [] OOB as tolerated [] Bedrest [X] PT [X] OT [] PMNR      PULM:  Intubated since 5/29- Awaiting family decision on goals of care-, unclear if family will elect to trach yet.  Pt has no cuff leak likely due to prolonged intubation and laryngeal edema. She remains not having a cuff leak despite decadron, at this point the safest way to place a tracheostomy, family refusing to make decisions regarding trach or comfort measures, ethics are on consult, we will reconsult    H/O of COPD   CPAP  (has been on CPAP for 3 days)   Sats >96 %    Mech vent- CPAP 5/5, no cuff leak, copious thin secretions  Direct laryngoscopy at bedside 7/6, no laryngeal edema but copious secretions. Continue pulm toilet and CPAP as tolerated      CV: Intermittently tachycardic  Keep -160  HTN: Continue norvasc   Sinus tachycardia, metoprolol  25 q6h  Statin for HLD    PICC line pending family decision      RENAL:  IVL. Monitor Is/Os  On cardura for urinary retention  Straight cath Q6    GI:  Diet: On Glucerna OGT   resume TF   Continue Banana flakes and metamucil, LBM 7/4  GI prophylaxis [] not indicated [x] PPI. Protonix is home med.      ENDO: S/P hyperglycemia - IR DM  Endocrine following for adjustment of NPH;   Continue PO synthroid  Continue prednisone 15mg, 5 mg at bedtime (was on it at home for RA) once decadron is discontinued 9 tomorrow)   Goal euglycemia (-180)  -Adjust NPH as follows: per endo   Monitor for any hypoglycemia        HEME/ONC:   On SQL for DVT ppx- 30mg SQ q12.  VTE prophylaxis: [X] SCDs [X] chemoprophylaxis [X] high risk of DVT/PE on admission due to: bed bound at baseline  LE doppler 5/30- No DVT    ID:  Afebrile   S/P Ertapenem for ESBL found growing in urine culture on 6/4 - ended on  - 6/14      MISC: Meeting held on 7/8. Family to make final decision on 7/9.       Access: Extended dwell catheter PIV placed R forearm.    CODE STATUS:  [X] Full Code     DISPOSITION:  [X] ICU    [X] Patient is at high risk of neurologic deterioration/death due to: resp failure, seizures, hemorrhage.     40 critical care time

## 2020-07-09 NOTE — PROGRESS NOTE ADULT - PROBLEM SELECTOR PLAN 1
-test BG Q6h  --Adjust  NPH regimen:  6am: increase NPH 52 units sq   12pm: c/w NPH to 18 units sq  6pm: c/w NPH 8 units sq   12am: c/w NPH 12 units sq  hold NPH if Tube feeds off. Can give 50% of dose if BG less than 100mg/dl)  -continue Humalog moderate correction scale Q6h  -Please notify endocrine if any changes in steroid regimen or TF regimen so we can adjust insulin regimen accordingly.

## 2020-07-09 NOTE — PROGRESS NOTE ADULT - SUBJECTIVE AND OBJECTIVE BOX
SUMMARY: 63 y/o F with past medical hx of hypothyroidism, HTN, IDDM, COPD/CHRIS on home O2, RA (on Prednisone), SBO s/p ex lap with lysis of adhesions c/b evisceration,  recent hospitalization in 3/2020 with encephalopathy, significantly hypothyroid (, without myxedema coma), small (stable) right sided SDH, UTI, catatonic reaction to VPA (now resolved) and DKA and psychiatric issues, 5/2020 hospitalization for GIB, Emphysematous cystitis, endometritis, Sigmoid colitis, morbid obesity, functional quadriplegia presented on 5/30 with AMS and was found to have L frontal IPH. Per report, patient seized at home prior to presentation, but has not noted to be seizing during the hospitalization. Continued Goals of care discussion with family; palliative and ethics involved    HCP: Norris Rush, Son [phone]; Daughter, Heavenly Schmidt [phone]; son asks that only him or his sister, Heavenly be contacted for patient updates/information as they are both proxy's for patient (29 May 2020 23:41)    ADMISSION SCORES:  GCS: 5 [E2 VNT M3]  ICH: 2    HOSP COURSE:   5/29: DDAVP  6/9: Found to have LUE spasmodic movements. Aborted after holding LUE.   6/9: [facility] meeting with palliative care, ethics.  7/4-7/6: Remains with poor neuro exam, thin to moderate in line and oral secretions. Tolerated PS but no cuff leak. On decadron.   7/8: Family meeting    OVERNIGHT EVENTS: Afebrile      ICU Vital Signs Last 24 Hrs    ICU Vital Signs Last 24 Hrs  T(C): 37.2 (09 Jul 2020 05:00), Max: 37.3 (08 Jul 2020 15:00)  T(F): 99 (09 Jul 2020 05:00), Max: 99.1 (08 Jul 2020 15:00)  HR: 97 (09 Jul 2020 06:04) (92 - 104)  BP: 121/88 (09 Jul 2020 05:00) (104/67 - 135/73)  BP(mean): 89 (09 Jul 2020 05:00) (77 - 108)  RR: 19 (09 Jul 2020 05:00) (15 - 21)  SpO2: 99% (09 Jul 2020 06:04) (97% - 100%)      07-07-20 @ 07:01  -  07-08-20 @ 07:00  --------------------------------------------------------  IN: 1700 mL / OUT: 1125 mL / NET: 575 mL    07-08-20 @ 07:01  -  07-09-20 @ 06:39  --------------------------------------------------------  IN: 1660 mL / OUT: 1150 mL / NET: 510 mL      Mode: AC/ CMV (Assist Control/ Continuous Mandatory Ventilation), RR (machine): 14, TV (machine): 450, FiO2: 30, PEEP: 5, ITime: 1, MAP: 9, PIP: 23  acetaminophen    Suspension .. 650 milliGRAM(s) Oral every 6 hours PRN  amLODIPine   Tablet 10 milliGRAM(s) Oral daily  chlorhexidine 0.12% Liquid 15 milliLiter(s) Oral Mucosa every 12 hours  chlorhexidine 4% Liquid 1 Application(s) Topical <User Schedule>  dextrose 5%. 1000 milliLiter(s) (50 mL/Hr) IV Continuous <Continuous>  dextrose 50% Injectable 25 Gram(s) IV Push once  doxazosin 4 milliGRAM(s) Oral at bedtime  enoxaparin Injectable 30 milliGRAM(s) SubCutaneous two times a day  gabapentin   Solution 300 milliGRAM(s) Oral every 8 hours  glycopyrrolate Injectable 0.2 milliGRAM(s) IV Push three times a day PRN  insulin lispro (HumaLOG) corrective regimen sliding scale   SubCutaneous every 6 hours  insulin NPH human recombinant 12 Unit(s) SubCutaneous <User Schedule>  insulin NPH human recombinant 50 Unit(s) SubCutaneous <User Schedule>  insulin NPH human recombinant 18 Unit(s) SubCutaneous <User Schedule>  insulin NPH human recombinant 8 Unit(s) SubCutaneous <User Schedule>  lacosamide Solution 200 milliGRAM(s) Oral two times a day  levETIRAcetam  Solution 1000 milliGRAM(s) Oral two times a day  levothyroxine 150 MICROGram(s) Oral daily  metoprolol tartrate 25 milliGRAM(s) Oral every 6 hours  modafinil 200 milliGRAM(s) Oral daily  pantoprazole   Suspension 40 milliGRAM(s) Oral daily  phenytoin   Suspension 250 milliGRAM(s) Oral every 8 hours  simvastatin 20 milliGRAM(s) Oral at bedtime  sodium chloride 0.9% lock flush 10 milliLiter(s) IV Push every 1 hour PRN  traMADol 50 milliGRAM(s) Oral every 4 hours PRN                          9.0    11.83 )-----------( 428      ( 08 Jul 2020 22:27 )             29.8     07-08    139  |  101  |  20  ----------------------------<  133<H>  4.1   |  23  |  0.56    Ca    9.5      08 Jul 2020 22:27  Phos  3.7     07-08  Mg     2.0     07-08      PHYSICAL EXAM:  Neurological: Intubated, EO to noxious stim, does not track, dysconjugate gaze, pupils 3mm reactive, +cough/gag, does not follow commands, upper extremities tremors when stimulate left UE spontaneous, withdrew LE    Pulmonary: No respiratory distress, diminished  Cardiovascular: S1/S2  Gastrointestinal: soft, non-distended, non-tender  Abd: soft, non-tender, nl bs ,+ skin lesion (surgical scar midline)  Extremities: Warm, dry  Skin: Skin tear on buttocks. Extensive bruising on extremities on trunk; present on admission      CULTURES:  Culture Results:   Normal Respiratory Perla present (06-29 @ 22:28)  Culture Results:   No Growth Final (06-29 @ 22:02)    RADIOLOGY & ADDITIONAL TESTS:

## 2020-07-10 LAB
GLUCOSE BLDC GLUCOMTR-MCNC: 195 MG/DL — HIGH (ref 70–99)
GLUCOSE BLDC GLUCOMTR-MCNC: 257 MG/DL — HIGH (ref 70–99)
GLUCOSE BLDC GLUCOMTR-MCNC: 271 MG/DL — HIGH (ref 70–99)
GLUCOSE BLDC GLUCOMTR-MCNC: 303 MG/DL — HIGH (ref 70–99)

## 2020-07-10 PROCEDURE — 71045 X-RAY EXAM CHEST 1 VIEW: CPT | Mod: 26

## 2020-07-10 PROCEDURE — 99291 CRITICAL CARE FIRST HOUR: CPT

## 2020-07-10 PROCEDURE — 99497 ADVNCD CARE PLAN 30 MIN: CPT

## 2020-07-10 PROCEDURE — 99233 SBSQ HOSP IP/OBS HIGH 50: CPT

## 2020-07-10 RX ORDER — HALOPERIDOL DECANOATE 100 MG/ML
0.5 INJECTION INTRAMUSCULAR EVERY 6 HOURS
Refills: 0 | Status: DISCONTINUED | OUTPATIENT
Start: 2020-07-10 | End: 2020-07-13

## 2020-07-10 RX ORDER — LACOSAMIDE 50 MG/1
200 TABLET ORAL
Refills: 0 | Status: DISCONTINUED | OUTPATIENT
Start: 2020-07-10 | End: 2020-07-11

## 2020-07-10 RX ORDER — HYDROMORPHONE HYDROCHLORIDE 2 MG/ML
0.5 INJECTION INTRAMUSCULAR; INTRAVENOUS; SUBCUTANEOUS
Refills: 0 | Status: DISCONTINUED | OUTPATIENT
Start: 2020-07-10 | End: 2020-07-13

## 2020-07-10 RX ORDER — TRAMADOL HYDROCHLORIDE 50 MG/1
50 TABLET ORAL EVERY 4 HOURS
Refills: 0 | Status: DISCONTINUED | OUTPATIENT
Start: 2020-07-10 | End: 2020-07-13

## 2020-07-10 RX ADMIN — LACOSAMIDE 200 MILLIGRAM(S): 50 TABLET ORAL at 17:01

## 2020-07-10 RX ADMIN — Medication 6: at 17:25

## 2020-07-10 RX ADMIN — GABAPENTIN 300 MILLIGRAM(S): 400 CAPSULE ORAL at 05:10

## 2020-07-10 RX ADMIN — CHLORHEXIDINE GLUCONATE 15 MILLILITER(S): 213 SOLUTION TOPICAL at 17:01

## 2020-07-10 RX ADMIN — SIMVASTATIN 20 MILLIGRAM(S): 20 TABLET, FILM COATED ORAL at 23:02

## 2020-07-10 RX ADMIN — CHLORHEXIDINE GLUCONATE 15 MILLILITER(S): 213 SOLUTION TOPICAL at 05:10

## 2020-07-10 RX ADMIN — Medication 25 MILLIGRAM(S): at 17:01

## 2020-07-10 RX ADMIN — LEVETIRACETAM 1000 MILLIGRAM(S): 250 TABLET, FILM COATED ORAL at 17:01

## 2020-07-10 RX ADMIN — Medication 650 MILLIGRAM(S): at 18:42

## 2020-07-10 RX ADMIN — Medication 8: at 12:34

## 2020-07-10 RX ADMIN — Medication 250 MILLIGRAM(S): at 14:24

## 2020-07-10 RX ADMIN — Medication 4 MILLIGRAM(S): at 23:02

## 2020-07-10 RX ADMIN — MODAFINIL 200 MILLIGRAM(S): 200 TABLET ORAL at 11:50

## 2020-07-10 RX ADMIN — HUMAN INSULIN 52 UNIT(S): 100 INJECTION, SUSPENSION SUBCUTANEOUS at 05:12

## 2020-07-10 RX ADMIN — LACOSAMIDE 200 MILLIGRAM(S): 50 TABLET ORAL at 05:11

## 2020-07-10 RX ADMIN — PANTOPRAZOLE SODIUM 40 MILLIGRAM(S): 20 TABLET, DELAYED RELEASE ORAL at 11:50

## 2020-07-10 RX ADMIN — Medication 250 MILLIGRAM(S): at 05:11

## 2020-07-10 RX ADMIN — HYDROMORPHONE HYDROCHLORIDE 0.5 MILLIGRAM(S): 2 INJECTION INTRAMUSCULAR; INTRAVENOUS; SUBCUTANEOUS at 23:03

## 2020-07-10 RX ADMIN — Medication 6: at 05:12

## 2020-07-10 RX ADMIN — CHLORHEXIDINE GLUCONATE 1 APPLICATION(S): 213 SOLUTION TOPICAL at 23:00

## 2020-07-10 RX ADMIN — GABAPENTIN 300 MILLIGRAM(S): 400 CAPSULE ORAL at 14:25

## 2020-07-10 RX ADMIN — GABAPENTIN 300 MILLIGRAM(S): 400 CAPSULE ORAL at 23:01

## 2020-07-10 RX ADMIN — ENOXAPARIN SODIUM 30 MILLIGRAM(S): 100 INJECTION SUBCUTANEOUS at 05:11

## 2020-07-10 RX ADMIN — Medication 250 MILLIGRAM(S): at 23:01

## 2020-07-10 RX ADMIN — ENOXAPARIN SODIUM 30 MILLIGRAM(S): 100 INJECTION SUBCUTANEOUS at 17:01

## 2020-07-10 RX ADMIN — ROBINUL 0.2 MILLIGRAM(S): 0.2 INJECTION INTRAMUSCULAR; INTRAVENOUS at 23:03

## 2020-07-10 RX ADMIN — Medication 0.5 MILLIGRAM(S): at 23:02

## 2020-07-10 RX ADMIN — Medication 150 MICROGRAM(S): at 05:12

## 2020-07-10 RX ADMIN — Medication 25 MILLIGRAM(S): at 05:11

## 2020-07-10 RX ADMIN — HUMAN INSULIN 8 UNIT(S): 100 INJECTION, SUSPENSION SUBCUTANEOUS at 17:22

## 2020-07-10 RX ADMIN — AMLODIPINE BESYLATE 10 MILLIGRAM(S): 2.5 TABLET ORAL at 05:11

## 2020-07-10 RX ADMIN — Medication 25 MILLIGRAM(S): at 11:50

## 2020-07-10 RX ADMIN — LEVETIRACETAM 1000 MILLIGRAM(S): 250 TABLET, FILM COATED ORAL at 05:11

## 2020-07-10 NOTE — PROGRESS NOTE ADULT - ATTENDING COMMENTS
The patient has been identified to benefit from a referral to one or more of the following interdisciplinary team members.  An "X" denotes consult being placed    Spiritual Support/Emotional Support Team:    [x]           []          []          []     Other       []    Social Work:  Floor  []    Palliative Medicine Consult Team  []   Palliative Care Unit  [x]    Hospice:  Hospice Liaison [  ]    Additional Members:  Child Life Specialist    [x] alexander have them contact the daughter Heavenly  Palliative Volunteer   []  Holistic Nurse             []  Pet Therapy                []  Other                           []    Administrative:  Patient Family Centered Care []  Hospital Volunteer                    []

## 2020-07-10 NOTE — PROGRESS NOTE ADULT - ASSESSMENT
ASSESSMENT:  Left frontal intracerebral hemorrhage, poor neurologic examination  Continued goals of care conversation, palliative and ethics involved.       NEURO:  In vegetative state   Neuro Q4h check  CT head 6/30: New increased attenuation involving the bilateral parasagittal and left lateral frontoparietal cortices. patient was never hypoxemic, and no seizures on EEG  Seizure management: Keppra, Vimpat, fosphenytoin  Continue Provigil for neurostimulation    Pain management prn. 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    Trach and PEG being discussed with family. No decision as of yet   Activity: [] OOB as tolerated [] Bedrest [X] PT [X] OT [] PMNR      PULM:  Intubated since 5/29- Awaiting family decision on goals of care-, unclear if family will elect to trach yet.  Pt has no cuff leak likely due to prolonged intubation and laryngeal edema. She remains not having a cuff leak despite decadron, at this point the safest way to place a tracheostomy, family refusing to make decisions regarding trach or comfort measures, ethics are on consult, we will reconsult    H/O of COPD   CPAP  (has been on CPAP for 3 days)   Sats >96 %    Mech vent- CPAP 5/5, no cuff leak, copious thin secretions  Direct laryngoscopy at bedside 7/6, no laryngeal edema but copious secretions. Continue pulm toilet and CPAP as tolerated      CV: Intermittently tachycardic  Keep -160  HTN: Continue norvasc   Sinus tachycardia, metoprolol  25 q6h  Statin for HLD    PICC line pending family decision      RENAL:  IVL. Monitor Is/Os  On cardura for urinary retention  Straight cath Q6    GI:  Diet: On Glucerna OGT   resume TF   Continue Banana flakes and metamucil, LBM 7/4  GI prophylaxis [] not indicated [x] PPI. Protonix is home med.      ENDO: S/P hyperglycemia - IR DM  Endocrine following for adjustment of NPH;   Continue PO synthroid  Continue prednisone 15mg, 5 mg at bedtime (was on it at home for RA) once decadron is discontinued 9 tomorrow)   Goal euglycemia (-180)  -Adjust NPH as follows: per endo   Monitor for any hypoglycemia        HEME/ONC:   On SQL for DVT ppx- 30mg SQ q12.  VTE prophylaxis: [X] SCDs [X] chemoprophylaxis [X] high risk of DVT/PE on admission due to: bed bound at baseline  LE doppler 5/30- No DVT    ID:  Afebrile   S/P Ertapenem for ESBL found growing in urine culture on 6/4 - ended on  - 6/14      MISC: Meeting held on 7/8. Family to make final decision on 7/9.       Access: Extended dwell catheter PIV placed R forearm.    CODE STATUS:  [X] Full Code     DISPOSITION:  [X] ICU    [X] Patient is at high risk of neurologic deterioration/death due to: resp failure, seizures, hemorrhage.     40 critical care time ASSESSMENT:  Left frontal intracerebral hemorrhage, poor neurologic examination  Continued goals of care conversation, palliative and ethics involved.       NEURO:  In vegetative state   Neuro Q4h check  CT head 6/30: New increased attenuation involving the bilateral parasagittal and left lateral frontoparietal cortices. patient was never hypoxemic, and no seizures on EEG  Seizure management: Keppra, Vimpat, fosphenytoin  Continue Provigil for neurostimulation    Pain management prn. 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    Trach and PEG being discussed with family. No decision as of yet   Activity: [] OOB as tolerated [] Bedrest [X] PT [X] OT [] PMNR      PULM:  Intubated since 5/29- Awaiting family decision on goals of care-, unclear if family will elect to trach yet.  Pt has no cuff leak likely due to prolonged intubation and laryngeal edema. She remains not having a cuff leak despite decadron, at this point the safest way to place a tracheostomy, family refusing to make decisions regarding trach or comfort measures, ethics are on consult.   H/O of COPD   Sats >96 %    Mech vent- CPAP 5/5, no cuff leak, copious thin secretions  Direct laryngoscopy at bedside 7/6, no laryngeal edema but copious secretions. Continue pulm toilet and CPAP as tolerated      CV: Intermittently tachycardic  Keep -160  HTN: Continue norvasc   Sinus tachycardia, metoprolol 25 q6h  Statin for HLD    PICC line pending family decision      RENAL:  IVL. Monitor Is/Os  On cardura for urinary retention  Straight cath Q6    GI:  Diet: On Glucerna OGT   resume TF   Continue Banana flakes and metamucil, LBM 7/9  GI prophylaxis [] not indicated [x] PPI. Protonix is home med.      ENDO: S/P hyperglycemia - IR DM  Endocrine following for adjustment of NPH;   Continue PO synthroid  Continue prednisone 15mg, 5 mg at bedtime (was on it at home for RA) once decadron is discontinued 9 tomorrow)   Goal euglycemia (-180)  -Adjust NPH as follows: per endo   Currently hyperglycemic 200s-300. ENDO following        HEME/ONC:   On SQL for DVT ppx- 30mg SQ q12.  VTE prophylaxis: [X] SCDs [X] chemoprophylaxis [X] high risk of DVT/PE on admission due to: bed bound at baseline  LE doppler 5/30- No DVT    ID:  Afebrile   S/P Ertapenem for ESBL found growing in urine culture on 6/4 - ended on  - 6/14      MISC: Meeting held on 7/8, 7/9. Family to make final decision on 7/10  Palliative care resident and attending, BHAVIN, NSCU fellow, 4 family members to be present for 7/10 meeting.        Access: Extended dwell catheter PIV placed R forearm.      CODE STATUS:  [X] Full Code     DISPOSITION:  [X] ICU    [X] Patient is at high risk of neurologic deterioration/death due to: resp failure, seizures, hemorrhage.     40 critical care time

## 2020-07-10 NOTE — PROGRESS NOTE ADULT - PROBLEM SELECTOR PLAN 1
Predominant symptom:  Likely due to  Degree of control:  Relative to previous day:  Current treatment regimen:  Recommendations:  Risk mitigation:  Adverse events noted: Predominant symptom: encephalopathy  Likely due to neurologic insult  Degree of control: well controlled hypoactive delirium  Relative to previous day: unchanged  Current treatment regimen: none  Recommendations: IV haldol 0.5mg q6H for hyperactive delirium  Adverse events noted: none

## 2020-07-10 NOTE — GOALS OF CARE CONVERSATION - ADVANCED CARE PLANNING - CONVERSATION DETAILS
Palliative reconsulted to assist in ongoing GOC discussion in the setting of patient with advanced illness. Family meeting held today with Dr. Purcell of [facility], Dr. Eric of Palliative, Dr. Mosquera Palliative Fellow, BHAVIN Navarro [place], and Palliative [facility], as well as patient's children Heavenly (dtr/HCP), Norris (son/HCP), Dhruv (son), and Mala (son).    Dr. Eric and Dr. Purcell first began meeting today by providing update on patient's current medical condition, as well as the hopes and goals that patient's family has for her medical status moving forward. Patient's children informed team that patient with hx of prolonged intubation, and notes having similar discussions with medical team discussing possible trach placement or deescalation of care. Patient's family states during that prolonged intubation, patient was eventually able to be extubated and though her functional and cognitive status was not 100% what it had been prior, patient was mostly herself again, which makes this situation that much more difficult. Patient's family informed team that on this admission, they have each seen signs that patient is able to physically respond to the children at times, which has caused much fear in deescalating care. Patient's children inquired into other cases with prolonged intubation and what those outcomes have been like, and discussed the fear that patient may be trached but never be able to come off the ventilator and be as independent as she once was. All questions and concerns addressed.    Patient's daughter Heavenly discussed the patient further with team today, stating that patient was always a vocal, independent person. As per Heavenly, patient "said what she meant, and meant what she said". Heavenly verbalized fear that she and her siblings may never hear patient's voice again, and vocalized her desire to allow the patient to rest and be at peace. Patient's son Norris also described his mother as a vocal person, and states that he feels that now, the patient is being tortured. Dr. Eric inquired into the hopes that patient's children have at this time, and Heavenly states she is hopeful for peace and rest, while Norris states he is "selfishly" hopeful that his mother will come back to the family, and discussed the family's lauren in God that patient may return to them. Norris states he has been struggling with the patient's current status greatly, and notes this time as being the "most painful in [his] life", and informed team that he has been replaying the events leading up to patient's hospitalization in his mind, which has been devastating to him. Norris informed team that he feels that at this time, patient is suffering, and he no longer wishes for patient to feel this way. Norris also discussed his concerns for trach with patient, and having the patient be placed into a vent SNF, where the family may be unable to visit due to COVID concerns and where patient may not receive the level of care previously provided to her by her family. Much validation and emotional support provided to Norris and Heavenly at this time.    Patient's son Mala informed team that as much as he respects and acknowledges his sibling's views of the appropriate next steps for patient, Mala is unwilling to deescalate care at this time, as he does not wish to "give up". Mala states that patient has always been available and present to assist her children with any of their hardships or medical events, and that he cannot give up on her, as she would not give up on them. Mala states he and his siblings will continue to have these discussions amongst themselves, but states there will be no decision made today in patient's healthcare.     Dr. Eric and Dr. Purcell informed the family that a decision cannot be delayed much longer, as patient is beginning to show adverse effects from having been intubated for so long. Dr. Eric summarized the decision for family as identifying goals, including quantity of life with trach and SNF placement, or quality of life with deescalation and shifting the focus of care to active symptom management. Dr. Eric strongly urged patient's children to come to a consensus on the next steps for patient, while again reinforcing that the decision cannot be delayed much longer. Emotional support, active listening, and room for ventilation of feelings provided to patient's family today, and much validation for the love, advocacy, and support they have shown the patient provided as well. Patient's children verbalized appreciation of support, and plan to speak with team Monday, 7/13, further about decision-making.    Palliative will continue to follow this case for ongoing support and GOC discussion. Palliative reconsulted to assist in ongoing GOC discussion in the setting of patient with advanced illness. Family meeting held today with Dr. Purcell of [facility], Dr. Eric of Palliative, Dr. Mosquera Palliative Fellow, BHAVIN Navarro [place], and Palliative [facility] [place] [facility], as well as patient's children Heavenly (dtr/HCP), Norris (son/HCP), Dhruv (son), and Mala (son).    Dr. Eric and Dr. Purcell first began meeting today by providing update on patient's current medical condition, as well as inquiring into the hopes and goals that patient's family has for her medical status moving forward. Patient's children informed team that patient with hx of prolonged intubation during prior hospitalization, and notes having similar discussions with medical team regarding possible trach placement or deescalation of care. Patient's family states during that prolonged intubation, patient was eventually able to be extubated and though her functional and cognitive status was not exactly as it was prior, patient was mostly herself again, which makes this situation that much more difficult. Patient's family informed team that on this admission, they feel they have each seen signs that patient is able to physically respond to the children at times, which has caused much fear in deescalating care. Patient's children inquired into other cases with prolonged intubation and what those outcomes have been like, and discussed the fear that patient may be trached but never be able to come off the ventilator and be as independent as she once was. All questions and concerns addressed.    Patient's daughter Heavenly discussed the patient further with team today, stating that patient was always a vocal, independent person. As per Heavenly, patient "said what she meant, and meant what she said". Heavenly verbalized fear that she and her siblings may never hear patient's voice again, and vocalized her desire to allow the patient to rest and be at peace. Patient's son Norris also described his mother as a vocal person, and states that he feels that now, the patient is being tortured. Dr. Eric inquired into the hopes that patient's children have at this time, and Heavenly states she is hopeful for peace and rest, while Norris states he is "selfishly" hopeful that his mother will come back to the family, and discussed the family's lauren in God that patient may return to them. Norris states he has been struggling with the patient's current status greatly, and notes this time as being the "most painful in [his] life", and informed team that he has been replaying the events leading up to patient's hospitalization in his mind, which has been devastating to him. Norris informed team that he feels that at this time, patient is suffering, and he no longer wishes for patient to feel this way. Norris also discussed his concerns for trach with patient, and having the patient be placed into a vent SNF, where the family may be unable to visit due to COVID concerns and where patient may not receive the level of care previously provided to her by her family. Much validation and emotional support provided to Norris and Heavenly at this time.    Patient's son Mala informed team that as much as he respects and acknowledges his sibling's views of the appropriate next steps for patient, Mala is unwilling to deescalate care at this time, as he does not wish to "give up". Mala states that patient has always been available and present to assist her children with any of their hardships or medical events, and that he cannot give up on her, as she would not give up on them. Mala states he and his siblings will continue to have these discussions amongst themselves, but states there will be no decision made in patient's healthcare today.     Dr. Eric and Dr. Purcell informed the family that a decision cannot be delayed much longer, as patient is beginning to show adverse effects from having been intubated for so long. Dr. Eric summarized the decision for family as identifying goals, including quantity of life with trach and SNF placement, or quality of life with deescalation and shifting the focus of care to active symptom management. Dr. Eric strongly urged patient's children to come to a consensus on the next steps for patient, while again reinforcing that the decision cannot be delayed much longer. Emotional support, active listening, and room for ventilation of feelings provided to patient's family today, and much validation for the love, advocacy, and support they have shown the patient provided as well. Patient's children verbalized appreciation of support, and plan to speak with team Monday, 7/13, further about decision-making.    Palliative will continue to follow this case for ongoing support and GOC discussion.

## 2020-07-10 NOTE — CHART NOTE - NSCHARTNOTEFT_GEN_A_CORE
Stamps    Topic discussed previously    Yes []      No []    Complexity        Low[]              Medium []      High []       Unknown []    Potential barriers to expeditious decision making:    Objectives defined in premeeting huddle:    Provider:          Content:        Tone:          Summary:       Action Items:      Follow up: [place]:  Family meeting with Childrandy Norris Smalls [place] and Papo, also in the meeting present was the , NSCU team and palliative care team. it was discussed in the meeting the diagnosis , prognosis and the plan moving forward as patient has been intubated for 39 days now and counting. As the NSCU team indicated that we are causing more harm than good to the patient as still being intubated. Family stated that patient had been through a similar situation before where she was intubated for about a month and got extubated and still alive with us today, they also acknowledged the fact that patient was not 100 % functional prior to this hospitalization but they are hoping that she will pull through since she did pull through last time.   Family were all in agreement that patient is a  very vocal person, always peaking out her mind, very loving person who made everyone around her feel special, they were all very tearful and emotional, as they all agreed that this is one of the most painful things they have had to go through.   papo one of the son's stated that he understands that she may not have a meaningful quality of life but rather he see "LIFE" in her, so he would rather have her around even if she remains bedbound, remains trached and PEG'ed, he will fight for her and let GOD work for her, he also stated that for him a decision should not be made today. The other siblings are leaning towards no trache, No PEG and do not want any more suffering caused to the patient.     Topic discussed previously    Yes [x]      No []    Complexity        Low[]              Medium []      High [x]       Unknown []    Potential barriers to expeditious decision making: Siblings seems to be on the same page as they described their mother as a very loving person but then there is clearly an extent to which the they are willing to  "LET GO" as to make a decision with compassionate extubation    Objectives defined in premeeting huddle: 1) To discuss compassionate extubation, keeping patient comfortable relieving symptoms of pain and or SOB. 2) To Traech and PEG and NH      Summary: Family will continue to have discussions with the other siblings on whether to go the comfort approach, compassionately extubate or trache and PEG

## 2020-07-10 NOTE — GOALS OF CARE CONVERSATION - ADVANCED CARE PLANNING - STAFF/PROVIDER
Dr. Purcell of [facility], Palliative MD Dr. Eric, Palliative Fellow Dr. Mosquera

## 2020-07-10 NOTE — PROGRESS NOTE ADULT - PROBLEM SELECTOR PLAN 5
Actions:  [] Rapport building     [] Symptom assessment    [] Eliciting preferences of goals   [] Prognostic understanding    [] Emotional Support  [] Coping skill development  []  Other  Interdisciplinary Referrals:  Communication:  Documentation Review: [] Primary Team [] Consultants [] Interdisciplinary team  Content: Actions:  [x] Rapport building     [x] Symptom assessment    [x] Eliciting preferences of goals   [x] Prognostic understanding    [x] Emotional Support  [] Coping skill development  []  Other  Interdisciplinary Referrals: See below  Communication: d/w NSCU team Fellow and SW  Documentation Review: [x] Primary Team [x] Consultants [] Interdisciplinary team    Total ACP time today > 76 min

## 2020-07-10 NOTE — PROGRESS NOTE ADULT - PROBLEM SELECTOR PLAN 3
Intubated  Day:  Mode: AC/ CMV (Assist Control/ Continuous Mandatory Ventilation)  RR (machine): 14  TV (machine): 450  FiO2: 30  PEEP: 5  ITime: 1  MAP: 10  PIP: 20 Intubated  Mode: AC/ CMV (Assist Control/ Continuous Mandatory Ventilation)  RR (machine): 14  TV (machine): 450  FiO2: 30  PEEP: 5  ITime: 1  MAP: 10  PIP: 20  Palliative extubation this Wednesday

## 2020-07-10 NOTE — GOALS OF CARE CONVERSATION - ADVANCED CARE PLANNING - CONVERSATION DETAILS
PCU Care plan delineation      HPI:  63 yo F with  hypothyroidism, HTN, IDDM, COPD/CHRIS on home O2, RA (on Prednisone), SBO s/p ex lap with lysis of adhesions c/b evisceration  history of trach and peg in the past after a protracted intubation > 30 days (reversal) ,  recent hospitalization in 3/2020 with encephalopathy, significantly hypothyroid (, without myxedema coma), small (stable) right sided SDH, UTI, catatonic reaction to VPA (now resolved) and DKA and psychiatric issues, 5/2020 hospitalization for GIB, Emphysematous cystitis, endometritis, Sigmoid colitis, morbid obesity, functional quadriplegia, a/w AMS found to have ICH now intubated > 40 days with no purposeful movement    We discussed globals aspects of the patient's care with a focus on review goals and values.  I discussed  different approaches to care: curative, restorative, or purely symptom focused.  The decision makers Heavenly and Norris were present and eager to discussion options pre and post extubation.  The family was interested in possible home hospice if she survives extubation.    I recommended a purely symptom focused approach including the following:  DNAR to allow for a natural death in the event of asystole or a fatal arrythmia  DNI once extubated  No routine surveillance with blood draws  No new fever work up (no imaging, no blood cultures, no care escalation, no new antibiotics)  No artificial nutrition once the feeding tube  No gastrostomy  No hemodialysis  APAP to address fevers  A trial of antibiotics to only address symptoms (e.g. dysuria)  A trial of IV fluids to address symptoms (e.g. seizure/myoclonus to address opiate induced neurotoxicity)  Maintenance of seizure medications  Maintenance of tramadol and gabapentin for OA.    Regarding my recommendations, they agreed to all of them.  They agreed to a transfer to the PCU as soon as bed is available  They agreed to extubation on Wednesday  They want to maintain current care until the periextubation period but realize that some adjustments may need to occur in as part of the process of extubation (feeds discontinuation  to reduce aspiration risk)  Additionally we discussed the possibility of fluid overload which would mean deescalation of IVF  Visitation regulations discussed with the PCU team and family, consensus found  Discussed symptom management and the fact that medications do not hasten death.  Explained the non-residential component of the PCU and its role in symptom management.  I explained that we would need to ask IV opioids in the event that her tramadol is not helpful since OA was a major part of her symptom burden.    RADHA placed in the chart with an incapacity form  Case discussed with the fellow on call PCU Care plan delineation      HPI:  63 yo F with  hypothyroidism, HTN, IDDM, COPD/CHRIS on home O2, RA (on Prednisone), SBO s/p ex lap with lysis of adhesions c/b evisceration  history of trach and peg in the past after a protracted intubation > 30 days (reversal) ,  recent hospitalization in 3/2020 with encephalopathy, significantly hypothyroid (, without myxedema coma), small (stable) right sided SDH, UTI, catatonic reaction to VPA (now resolved) and DKA and psychiatric issues, 5/2020 hospitalization for GIB, Emphysematous cystitis, endometritis, Sigmoid colitis, morbid obesity, functional quadriplegia, a/w AMS found to have ICH now intubated > 40 days with no purposeful movement    We discussed global aspects of the patient's care with a focus on review goals and values.  I discussed  different approaches to care: curative, restorative, or purely symptom focused.  The decision makers Heavenly and Norris were present and eager to discussion options pre and post extubation.  The family was interested in possible home hospice if she survives extubation.    I recommended a purely symptom focused approach including the following:  DNAR to allow for a natural death in the event of asystole or a fatal arrythmia  DNI once extubated  No routine surveillance with blood draws  No new fever work up (no imaging, no blood cultures, no care escalation, no new antibiotics)  No artificial nutrition once the feeding is stopped in the periextubation period  No gastrostomy  No hemodialysis  APAP to address fevers  A trial of antibiotics to only address symptoms (e.g. dysuria)  A trial of IV fluids to address symptoms (e.g. seizure/myoclonus to address opiate induced neurotoxicity)  Maintenance of seizure medications  Maintenance of tramadol and gabapentin for OA.    Regarding my recommendations, they agreed to all of them.  They agreed to a transfer to the PCU as soon as bed is available  They agreed to extubation on Wednesday  They want to maintain current care until the periextubation period but realize that some adjustments may need to occur in as part of the process of extubation (feeds discontinuation  to reduce aspiration risk)  Additionally we discussed the possibility of fluid overload which would mean deescalation of IVF  Visitation regulations discussed with the PCU team and family, consensus found  Discussed symptom management and the fact that medications do not hasten death.  Explained the non-residential component of the PCU and its role in symptom management.  I explained that we would need to ask IV opioids in the event that her tramadol is not helpful since OA was a major part of her symptom burden.  Post extubation prognosis of minutes to hours offered.  Explained that she may die prior to extubation.    RADHA placed in the chart with an incapacity form  Case discussed with the fellow on call

## 2020-07-10 NOTE — PROGRESS NOTE ADULT - PROBLEM SELECTOR PLAN 2
Condition:  Degree:  Active treatment:  Clinical impact on complexity: Condition: ICH  Degree: severe  Active treatment: No intervention  Clinical impact on complexity: significant

## 2020-07-10 NOTE — PROGRESS NOTE ADULT - PROBLEM SELECTOR PLAN 4
PPSv2 prior to admission:  Current functional PPSv2:  Nursing care required:  Code status documented:  A review of the paper chart has been conducted  HCP form present:               Yes and valid []               Yes but invalid []                No []   MOLST present:                   Yes and valid []               Yes but invalid []                No []  Incapacity form present:   Yes and valid []                Yes but invalid []                No []                 N/A []  Living Will:                            Yes and valid []                Yes but invalid []                No []      Family Health Care Decision Act (FHCDA) Surrogate Decision Maker Hierarchy in the absence of a health care agent  [facility] Article [address] Guardian-->Spouse or domestic partner--> Adult child-->Parent-->Sibling--> Close friend PPSv2 prior to admission: 30  Current functional PPSv2: 10-20  Nursing care required: Total assistance with all ADLs  Code status documented: DNR  A review of the paper chart has been conducted  HCP form present:               Yes and valid [x]               Yes but invalid []                No []   MOLST present:                   Yes and valid [x]               Yes but invalid []                No []  Incapacity form present:   Yes and valid [x]                Yes but invalid []                No []                 N/A []      Family Health Care Decision Act (FHCDA) Surrogate Decision Maker Hierarchy in the absence of a health care agent  [facility] Article 81 Guardian-->Spouse or domestic partner--> Adult child-->Parent-->Sibling--> Close friend

## 2020-07-10 NOTE — PROGRESS NOTE ADULT - SUBJECTIVE AND OBJECTIVE BOX
HPI:  65 yo F with AMS since this morning.  Pt. was confused, didn't recognize son this morning.  He notes she was sleeping more than usual yesterday, which tipped off son that something was wrong.  At 11 am pt. was gasping for air like she was seizing. 2 hours ago pt. was dry heaving, her chest was going up and down and she started shaking like she was having a seizure.  Pt. has no history of seizures per son.  		PMH: hypothyroidism, HTN, IDDM, COPD/CHRIS on home O2, RA (on Prednisone), SBO s/p ex lap with lysis of adhesions c/b evisceration,  recent hospitalization in 3/2020 with encephalopathy, significantly hypothyroid (, without myxedema coma), small (stable) right sided SDH, UTI, catatonic reaction to VPA (now resolved) and DKA and psychiatric issues, 2020 hospitalization for GIB, Emphysematous cystitis, endometritis, Sigmoid colitis, morbid obesity, functional quadriplegia    HCP: Norris Rush, Son [phone]; Daughter, Heavenly Schmidt [phone]; son asks that only him or his sister, Heavenly be contacted for patient updates/information as they are both proxy's for patient (29 May 2020 23:41)    PERTINENT PM/SXH:   Diverticulosis  Cellulitis  CVA (Cerebral Vascular Accident)  RA (Rheumatoid Arthritis)  Tooth Abscess  Arthritis  Cigarette Smoker  Chronic Asthma  Adult Hypothyroidism  Borderline Diabetes Mellitus  High Cholesterol  H/O: HTN    Wrist Disorder  History of  Section    FAMILY HISTORY:  No pertinent family history in first degree relatives    ITEMS NOT CHECKED ARE NOT PRESENT    SOCIAL HISTORY:   Significant other/partner[ ]  Children[ ]  Zoroastrian/Spirituality:  Substance hx:  [ ]   Tobacco hx:  [ ]   Alcohol hx: [ ]   Home Opioid hx:  [ ] I-Stop Reference No:  Living Situation: [ ]Home  [ ]Long term care  [ ]Rehab [ ]Other    ADVANCE DIRECTIVES:    DNR  MOLST  [ ]  Living Will  [ ]   DECISION MAKER(s):  [ ] Health Care Proxy(s)  [ ] Surrogate(s)  [ ] Guardian           Name(s): Phone Number(s):    BASELINE (I)ADL(s) (prior to admission):  [place]: [ ]Total  [ ] Moderate [ ]Dependent    Allergies    Depakote (Other)  Seroquel (Other (Severe))    Intolerances    MEDICATIONS  (STANDING):  amLODIPine   Tablet 10 milliGRAM(s) Oral daily  chlorhexidine 0.12% Liquid 15 milliLiter(s) Oral Mucosa every 12 hours  chlorhexidine 4% Liquid 1 Application(s) Topical <User Schedule>  dextrose 5%. 1000 milliLiter(s) (50 mL/Hr) IV Continuous <Continuous>  dextrose 50% Injectable 25 Gram(s) IV Push once  doxazosin 4 milliGRAM(s) Oral at bedtime  enoxaparin Injectable 30 milliGRAM(s) SubCutaneous two times a day  gabapentin   Solution 300 milliGRAM(s) Oral every 8 hours  insulin lispro (HumaLOG) corrective regimen sliding scale   SubCutaneous every 6 hours  insulin NPH human recombinant 12 Unit(s) SubCutaneous <User Schedule>  insulin NPH human recombinant 18 Unit(s) SubCutaneous <User Schedule>  insulin NPH human recombinant 8 Unit(s) SubCutaneous <User Schedule>  insulin NPH human recombinant 52 Unit(s) SubCutaneous <User Schedule>  lacosamide Solution 200 milliGRAM(s) Oral two times a day  levETIRAcetam  Solution 1000 milliGRAM(s) Oral two times a day  levothyroxine 150 MICROGram(s) Oral daily  metoprolol tartrate 25 milliGRAM(s) Oral every 6 hours  modafinil 200 milliGRAM(s) Oral daily  pantoprazole   Suspension 40 milliGRAM(s) Oral daily  phenytoin   Suspension 250 milliGRAM(s) Oral every 8 hours  simvastatin 20 milliGRAM(s) Oral at bedtime    MEDICATIONS  (PRN):  acetaminophen    Suspension .. 650 milliGRAM(s) Oral every 6 hours PRN Temp greater or equal to 38C (100.4F), Mild Pain (1 - 3)  glycopyrrolate Injectable 0.2 milliGRAM(s) IV Push three times a day PRN secretions  sodium chloride 0.9% lock flush 10 milliLiter(s) IV Push every 1 hour PRN Pre/post blood products, medications, blood draw, and to maintain line patency  traMADol 50 milliGRAM(s) Oral every 4 hours PRN Moderate Pain (4 - 6)    PRESENT SYMPTOMS: [ ]Unable to obtain due to poor mentation   Source if other than patient:  [ ]Family   [ ]Team     Pain: [ ]yes [ ]no  QOL impact -   Location -                    Aggravating factors -  Quality -  Radiation -  Timing-  Severity (0-10 scale):  Minimal acceptable level (0-10 scale):     PAIN AD Score:     [email or web address] (press ctrl +  left click to view)    Dyspnea:                           [ ]Mild [ ]Moderate [ ]Severe  Anxiety:                             [ ]Mild [ ]Moderate [ ]Severe  Fatigue:                             [ ]Mild [ ]Moderate [ ]Severe  Nausea:                             [ ]Mild [ ]Moderate [ ]Severe  Loss of appetite:              [ ]Mild [ ]Moderate [ ]Severe  Constipation:                    [ ]Mild [ ]Moderate [ ]Severe    Other Symptoms:  [ ]All other review of systems negative     Palliative Performance Status Version 2:         %    http://[facility].org/files/news/palliative_performance_scale_[email or web address].pdf  PHYSICAL EXAM:  Vital Signs Last 24 Hrs  T(C): 36.9 (10 Jul 2020 07:00), Max: 37.2 (10 Jul 2020 03:00)  T(F): 98.5 (10 Jul 2020 07:00), Max: 99 (10 Jul 2020 03:00)  HR: 114 (10 Jul 2020 12:12) (109 - 119)  BP: 125/84 (10 Jul 2020 11:07) (103/80 - 148/98)  BP(mean): 98 (10 Jul 2020 11:07) (89 - 108)  RR: 28 (10 Jul 2020 12:12) (18 - 28)  SpO2: 95% (10 Jul 2020 12:12) (93% - 100%) I&O's Summary    2020 07:01  -  10 Jul 2020 07:00  --------------------------------------------------------  IN: 2070 mL / OUT: 1025 mL / NET: 1045 mL    10 Jul 2020 07:01  -  10 Jul 2020 14:55  --------------------------------------------------------  IN: 195 mL / OUT: 0 mL / NET: 195 mL      GENERAL:  [ ]Alert  [ ]Oriented x   [ ]Lethargic  [ ]Cachexia  [ ]Unarousable  [ ]Verbal  [ ]Non-Verbal  Behavioral:   [ ] Anxiety  [ ] Delirium [ ] Agitation [ ] Other  HEENT:  [ ]Normal   [ ]Dry mouth   [ ]ET Tube/Trach  [ ]Oral lesions  PULMONARY:   [ ]Clear [ ]Tachypnea  [ ]Audible excessive secretions   [ ]Rhonchi        [ ]Right [ ]Left [ ]Bilateral  [ ]Crackles        [ ]Right [ ]Left [ ]Bilateral  [ ]Wheezing     [ ]Right [ ]Left [ ]Bilatera  [ ]Diminished breath sounds [ ]right [ ]left [ ]bilateral  CARDIOVASCULAR:    [ ]Regular [ ]Irregular [ ]Tachy  [ ]Ubaldo [ ]Murmur [ ]Other  GASTROINTESTINAL:  [ ]Soft  [ ]Distended   [ ]+BS  [ ]Non tender [ ]Tender  [ ]PEG [ ]OGT/ NGT  Last BM:     GENITOURINARY:  [ ]Normal [ ] Incontinent   [ ]Oliguria/Anuria   [ ]Antoine  MUSCULOSKELETAL:   [ ]Normal   [ ]Weakness  [ ]Bed/Wheelchair bound [ ]Edema  NEUROLOGIC:   [ ]No focal deficits  [ ]Cognitive impairment  [ ]Dysphagia [ ]Dysarthria [ ]Paresis [ ]Other   SKIN:   [ ]Normal    [ ]Rash  [ ]Pressure ulcer(s)       Present on admission [ ]y [ ]n    CRITICAL CARE:  [ ] Shock Present  [ ]Septic [ ]Cardiogenic [ ]Neurologic [ ]Hypovolemic  [ ]  Vasopressors [ ]  Inotropes   [ ]Respiratory failure present [ ]Mechanical ventilation [ ]Non-invasive ventilatory support [ ]High flow  [ ]Acute  [ ]Chronic [ ]Hypoxic  [ ]Hypercarbic [ ]Other  [ ]Other organ failure     LABS:                        9.0    11.83 )-----------( 428      ( 2020 22:27 )             29.8   07-08    139  |  101  |  20  ----------------------------<  133<H>  4.1   |  23  |  0.56    Ca    9.5      2020 22:27  Phos  3.7     07-08  Mg     2.0     07-08          RADIOLOGY & ADDITIONAL STUDIES:    PROTEIN CALORIE MALNUTRITION PRESENT: [ ]mild [ ]moderate [ ]severe [ ]underweight [ ]morbid obesity  https://www.andeal.org/vault/1064/web/files/ONC/Table_Clinical%20Characteristics%20to%20Document%20Malnutrition-White%20JV%20et%20al%231046.pdf    Height (cm): 162.6 (20 @ 00:49), 152.4 (20 @ 19:18), 154.9 (20 @ 21:48)  Weight (kg): 99.6 (20 @ 00:49), 136.1 (20 @ 19:18), 98.2 (20 @ 23:57)  BMI (kg/m2): 37.7 (20 @ 00:49), 51.5 (20 @ 00:49), 58.6 (20 @ 19:18)    [ ]PPSV2 < or = to 30% [ ]significant weight loss  [ ]poor nutritional intake  [ ]anasarca     Albumin, Serum: 3.5 g/dL (20 @ 05:20)   [ ]Artificial Nutrition      REFERRALS:   [ ]Chaplaincy  [ ]Hospice  [ ]Child Life  [ ]Social Work  [ ]Case management [ ]Holistic Therapy   HPI:  65 yo F with AMS since this morning.  Pt. was confused, didn't recognize son this morning.  He notes she was sleeping more than usual yesterday, which tipped off son that something was wrong.  At 11 am pt. was gasping for air like she was seizing. 2 hours ago pt. was dry heaving, her chest was going up and down and she started shaking like she was having a seizure.  Pt. has no history of seizures per son.  		PMH: hypothyroidism, HTN, IDDM, COPD/CHRIS on home O2, RA (on Prednisone), SBO s/p ex lap with lysis of adhesions c/b evisceration,  recent hospitalization in 3/2020 with encephalopathy, significantly hypothyroid (, without myxedema coma), small (stable) right sided SDH, UTI, catatonic reaction to VPA (now resolved) and DKA and psychiatric issues, 2020 hospitalization for GIB, Emphysematous cystitis, endometritis, Sigmoid colitis, morbid obesity, functional quadriplegia    HCP: Norris Rush, Son [phone]; DaughterHeavenly [phone]; son asks that only him or his sister, Heavenly be contacted for patient updates/information as they are both proxy's for patient (29 May 2020 23:41)    PERTINENT PM/SXH:   Diverticulosis  Cellulitis  CVA (Cerebral Vascular Accident)  RA (Rheumatoid Arthritis)  Tooth Abscess  Arthritis  Cigarette Smoker  Chronic Asthma  Adult Hypothyroidism  Borderline Diabetes Mellitus  High Cholesterol  H/O: HTN    Wrist Disorder  History of  Section    FAMILY HISTORY:  No pertinent family history in first degree relatives    ITEMS NOT CHECKED ARE NOT PRESENT    SOCIAL HISTORY:   Significant other/partner[ ]  Children[ ]  Zoroastrian/Spirituality:  Substance hx:  [ ]   Tobacco hx:  [ ]   Alcohol hx: [ ]   Home Opioid hx:  [ ] I-Stop Reference No:  Living Situation: [ ]Home  [ ]Long term care  [ ]Rehab [ ]Other    ADVANCE DIRECTIVES:    DNR  MOLST  [ ]  Living Will  [ ]   DECISION MAKER(s):  [ ] Health Care Proxy(s)  [ ] Surrogate(s)  [ ] Guardian           Name(s): Phone Number(s):    BASELINE (I)ADL(s) (prior to admission):  [place]: [ ]Total  [ ] Moderate [ ]Dependent    Allergies    Depakote (Other)  Seroquel (Other (Severe))    Intolerances    MEDICATIONS  (STANDING):  amLODIPine   Tablet 10 milliGRAM(s) Oral daily  chlorhexidine 0.12% Liquid 15 milliLiter(s) Oral Mucosa every 12 hours  chlorhexidine 4% Liquid 1 Application(s) Topical <User Schedule>  dextrose 5%. 1000 milliLiter(s) (50 mL/Hr) IV Continuous <Continuous>  dextrose 50% Injectable 25 Gram(s) IV Push once  doxazosin 4 milliGRAM(s) Oral at bedtime  enoxaparin Injectable 30 milliGRAM(s) SubCutaneous two times a day  gabapentin   Solution 300 milliGRAM(s) Oral every 8 hours  insulin lispro (HumaLOG) corrective regimen sliding scale   SubCutaneous every 6 hours  insulin NPH human recombinant 12 Unit(s) SubCutaneous <User Schedule>  insulin NPH human recombinant 18 Unit(s) SubCutaneous <User Schedule>  insulin NPH human recombinant 8 Unit(s) SubCutaneous <User Schedule>  insulin NPH human recombinant 52 Unit(s) SubCutaneous <User Schedule>  lacosamide Solution 200 milliGRAM(s) Oral two times a day  levETIRAcetam  Solution 1000 milliGRAM(s) Oral two times a day  levothyroxine 150 MICROGram(s) Oral daily  metoprolol tartrate 25 milliGRAM(s) Oral every 6 hours  modafinil 200 milliGRAM(s) Oral daily  pantoprazole   Suspension 40 milliGRAM(s) Oral daily  phenytoin   Suspension 250 milliGRAM(s) Oral every 8 hours  simvastatin 20 milliGRAM(s) Oral at bedtime    MEDICATIONS  (PRN):  acetaminophen    Suspension .. 650 milliGRAM(s) Oral every 6 hours PRN Temp greater or equal to 38C (100.4F), Mild Pain (1 - 3)  glycopyrrolate Injectable 0.2 milliGRAM(s) IV Push three times a day PRN secretions  sodium chloride 0.9% lock flush 10 milliLiter(s) IV Push every 1 hour PRN Pre/post blood products, medications, blood draw, and to maintain line patency  traMADol 50 milliGRAM(s) Oral every 4 hours PRN Moderate Pain (4 - 6)    PRESENT SYMPTOMS: [ ]Unable to obtain due to poor mentation   Source if other than patient:  [ ]Family   [ ]Team     Pain: [ ]yes [ ]no  QOL impact -   Location -                    Aggravating factors -  Quality -  Radiation -  Timing-  Severity (0-10 scale):  Minimal acceptable level (0-10 scale):     PAIN AD Score:     [email or web address] (press ctrl +  left click to view)    Dyspnea:                           [ ]Mild [ ]Moderate [ ]Severe  Anxiety:                             [ ]Mild [ ]Moderate [ ]Severe  Fatigue:                             [ ]Mild [ ]Moderate [ ]Severe  Nausea:                             [ ]Mild [ ]Moderate [ ]Severe  Loss of appetite:              [ ]Mild [ ]Moderate [ ]Severe  Constipation:                    [ ]Mild [ ]Moderate [ ]Severe    Other Symptoms:  [ ]All other review of systems negative     Palliative Performance Status Version 2:         %    http://npcrc.org/files/news/palliative_performance_scale_ppsv2.pdf  PHYSICAL EXAM:  Vital Signs Last 24 Hrs  T(C): 36.9 (10 Jul 2020 07:00), Max: 37.2 (10 Jul 2020 03:00)  T(F): 98.5 (10 Jul 2020 07:00), Max: 99 (10 Jul 2020 03:00)  HR: 114 (10 Jul 2020 12:12) (109 - 119)  BP: 125/84 (10 Jul 2020 11:07) (103/80 - 148/98)  BP(mean): 98 (10 Jul 2020 11:07) (89 - 108)  RR: 28 (10 Jul 2020 12:12) (18 - 28)  SpO2: 95% (10 Jul 2020 12:12) (93% - 100%) I&O's Summary    2020 07:01  -  10 Jul 2020 07:00  --------------------------------------------------------  IN: 2070 mL / OUT: 1025 mL / NET: 1045 mL    10 Jul 2020 07:01  -  10 Jul 2020 14:56  --------------------------------------------------------  IN: 195 mL / OUT: 0 mL / NET: 195 mL      GENERAL:  [ ]Alert  [ ]Oriented x   [ ]Lethargic  [ ]Cachexia  [ ]Unarousable  [ ]Verbal  [ ]Non-Verbal  Behavioral:   [ ] Anxiety  [ ] Delirium [ ] Agitation [ ] Other  HEENT:  [ ]Normal   [ ]Dry mouth   [ ]ET Tube/Trach  [ ]Oral lesions  PULMONARY:   [ ]Clear [ ]Tachypnea  [ ]Audible excessive secretions   [ ]Rhonchi        [ ]Right [ ]Left [ ]Bilateral  [ ]Crackles        [ ]Right [ ]Left [ ]Bilateral  [ ]Wheezing     [ ]Right [ ]Left [ ]Bilatera  [ ]Diminished breath sounds [ ]right [ ]left [ ]bilateral  CARDIOVASCULAR:    [ ]Regular [ ]Irregular [ ]Tachy  [ ]Ubaldo [ ]Murmur [ ]Other  GASTROINTESTINAL:  [ ]Soft  [ ]Distended   [ ]+BS  [ ]Non tender [ ]Tender  [ ]PEG [ ]OGT/ NGT  Last BM:     GENITOURINARY:  [ ]Normal [ ] Incontinent   [ ]Oliguria/Anuria   [ ]Antoine  MUSCULOSKELETAL:   [ ]Normal   [ ]Weakness  [ ]Bed/Wheelchair bound [ ]Edema  NEUROLOGIC:   [ ]No focal deficits  [ ]Cognitive impairment  [ ]Dysphagia [ ]Dysarthria [ ]Paresis [ ]Other   SKIN:   [ ]Normal    [ ]Rash  [ ]Pressure ulcer(s)       Present on admission [ ]y [ ]n    CRITICAL CARE:  [ ] Shock Present  [ ]Septic [ ]Cardiogenic [ ]Neurologic [ ]Hypovolemic  [ ]  Vasopressors [ ]  Inotropes   [ ]Respiratory failure present [ ]Mechanical ventilation [ ]Non-invasive ventilatory support [ ]High flow  [ ]Acute  [ ]Chronic [ ]Hypoxic  [ ]Hypercarbic [ ]Other  [ ]Other organ failure     LABS:                        9.0    11.83 )-----------( 428      ( 2020 22:27 )             29.8   07-08    139  |  101  |  20  ----------------------------<  133<H>  4.1   |  23  |  0.56    Ca    9.5      2020 22:27  Phos  3.7     07-08  Mg     2.0     07-08          RADIOLOGY & ADDITIONAL STUDIES:    PROTEIN CALORIE MALNUTRITION PRESENT: [ ]mild [ ]moderate [ ]severe [ ]underweight [ ]morbid obesity  https://www.andeal.org/vault/2440/web/files/ONC/Table_Clinical%20Characteristics%20to%20Document%20Malnutrition-White%20JV%20et%20al%197201.pdf    Height (cm): 162.6 (20 @ 00:49), 152.4 (20 @ 19:18), 154.9 (20 @ 21:48)  Weight (kg): 99.6 (20 @ 00:49), 136.1 (20 @ 19:18), 98.2 (20 @ 23:57)  BMI (kg/m2): 37.7 (20 @ 00:49), 51.5 (20 @ 00:49), 58.6 (20 @ 19:18)    [ ]PPSV2 < or = to 30% [ ]significant weight loss  [ ]poor nutritional intake  [ ]anasarca     Albumin, Serum: 3.5 g/dL (07-01-20 @ 05:20)   [ ]Artificial Nutrition      REFERRALS:   [ ]Chaplaincy  [ ]Hospice  [ ]Child Life  [ ]Social Work  [ ]Case management [ ]Holistic Therapy     Goals of Care Document:

## 2020-07-10 NOTE — PROGRESS NOTE ADULT - SUBJECTIVE AND OBJECTIVE BOX
SUMMARY: 65 y/o F with past medical hx of hypothyroidism, HTN, IDDM, COPD/CHRIS on home O2, RA (on Prednisone), SBO s/p ex lap with lysis of adhesions c/b evisceration,  recent hospitalization in 3/2020 with encephalopathy, significantly hypothyroid (, without myxedema coma), small (stable) right sided SDH, UTI, catatonic reaction to VPA (now resolved) and DKA and psychiatric issues, 5/2020 hospitalization for GIB, Emphysematous cystitis, endometritis, Sigmoid colitis, morbid obesity, functional quadriplegia presented on 5/30 with AMS and was found to have L frontal IPH. Per report, patient seized at home prior to presentation, but has not noted to be seizing during the hospitalization. Continued Goals of care discussion with family; palliative and ethics involved    HCP: Norris Rush, Son [phone]; Daughter, Heavenly Schmidt [phone]; son asks that only him or his sister, Heavenly be contacted for patient updates/information as they are both proxy's for patient (29 May 2020 23:41)    ADMISSION SCORES:  GCS: 5 [E2 VNT M3]  ICH: 2    HOSP COURSE:   5/29: DDAVP  6/9: Found to have LUE spasmodic movements. Aborted after holding LUE.   6/9: [facility] meeting with palliative care, ethics.  7/4-7/6: Remains with poor neuro exam, thin to moderate in line and oral secretions. Tolerated PS but no cuff leak. On decadron.   7/9: Family meeting. Family coming to terms with poor prognosis. Would like to meet again on 7/10    OVERNIGHT EVENTS: Afebrile      ICU Vital Signs Last 24 Hr    ICU Vital Signs Last 24 Hrs  T(C): 37.1 (10 Jul 2020 05:00), Max: 37.2 (10 Jul 2020 03:00)  T(F): 98.7 (10 Jul 2020 05:00), Max: 99 (10 Jul 2020 03:00)  HR: 117 (10 Jul 2020 05:00) (95 - 118)  BP: 103/80 (10 Jul 2020 05:00) (95/63 - 132/92)  BP(mean): 89 (10 Jul 2020 05:00) (72 - 106)  RR: 23 (10 Jul 2020 05:00) (17 - 24)  SpO2: 96% (10 Jul 2020 05:00) (93% - 100%)      07-08-20 @ 07:01  -  07-09-20 @ 07:00  --------------------------------------------------------  IN: 1660 mL / OUT: 1150 mL / NET: 510 mL    07-09-20 @ 07:01  -  07-10-20 @ 06:26  --------------------------------------------------------  IN: 1940 mL / OUT: 775 mL / NET: 1165 mL      Mode: AC/ CMV (Assist Control/ Continuous Mandatory Ventilation), RR (machine): 14, TV (machine): 450, FiO2: 30, PEEP: 5, ITime: 1, MAP: 10, PIP: 18  acetaminophen    Suspension .. 650 milliGRAM(s) Oral every 6 hours PRN  amLODIPine   Tablet 10 milliGRAM(s) Oral daily  chlorhexidine 0.12% Liquid 15 milliLiter(s) Oral Mucosa every 12 hours  chlorhexidine 4% Liquid 1 Application(s) Topical <User Schedule>  dextrose 5%. 1000 milliLiter(s) (50 mL/Hr) IV Continuous <Continuous>  dextrose 50% Injectable 25 Gram(s) IV Push once  doxazosin 4 milliGRAM(s) Oral at bedtime  enoxaparin Injectable 30 milliGRAM(s) SubCutaneous two times a day  gabapentin   Solution 300 milliGRAM(s) Oral every 8 hours  glycopyrrolate Injectable 0.2 milliGRAM(s) IV Push three times a day PRN  insulin lispro (HumaLOG) corrective regimen sliding scale   SubCutaneous every 6 hours  insulin NPH human recombinant 12 Unit(s) SubCutaneous <User Schedule>  insulin NPH human recombinant 18 Unit(s) SubCutaneous <User Schedule>  insulin NPH human recombinant 8 Unit(s) SubCutaneous <User Schedule>  insulin NPH human recombinant 52 Unit(s) SubCutaneous <User Schedule>  lacosamide Solution 200 milliGRAM(s) Oral two times a day  levETIRAcetam  Solution 1000 milliGRAM(s) Oral two times a day  levothyroxine 150 MICROGram(s) Oral daily  metoprolol tartrate 25 milliGRAM(s) Oral every 6 hours  modafinil 200 milliGRAM(s) Oral daily  pantoprazole   Suspension 40 milliGRAM(s) Oral daily  phenytoin   Suspension 250 milliGRAM(s) Oral every 8 hours  simvastatin 20 milliGRAM(s) Oral at bedtime  sodium chloride 0.9% lock flush 10 milliLiter(s) IV Push every 1 hour PRN  traMADol 50 milliGRAM(s) Oral every 4 hours PRN                          9.0    11.83 )-----------( 428      ( 08 Jul 2020 22:27 )             29.8     07-08    139  |  101  |  20  ----------------------------<  133<H>  4.1   |  23  |  0.56    Ca    9.5      08 Jul 2020 22:27  Phos  3.7     07-08  Mg     2.0     07-08      PHYSICAL EXAM:  Neurological: Intubated, EO to noxious stim, does not track, dysconjugate gaze, pupils 3mm reactive, +cough/gag, does not follow commands, upper extremities tremors when stimulate left UE spontaneous, withdrew LE    Pulmonary: No respiratory distress, diminished  Cardiovascular: S1/S2  Gastrointestinal: soft, non-distended, non-tender  Abd: soft, non-tender, nl bs ,+ skin lesion (surgical scar midline)  Extremities: Warm, dry  Skin: Skin tear on buttocks. Extensive bruising on extremities on trunk; present on admission      CULTURES:  Culture Results:   Normal Respiratory Perla present (06-29 @ 22:28)  Culture Results:   No Growth Final (06-29 @ 22:02)    RADIOLOGY & ADDITIONAL TESTS: SUMMARY: 65 y/o F with past medical hx of hypothyroidism, HTN, IDDM, COPD/CHRIS on home O2, RA (on Prednisone), SBO s/p ex lap with lysis of adhesions c/b evisceration,  recent hospitalization in 3/2020 with encephalopathy, significantly hypothyroid (, without myxedema coma), small (stable) right sided SDH, UTI, catatonic reaction to VPA (now resolved) and DKA and psychiatric issues, 5/2020 hospitalization for GIB, Emphysematous cystitis, endometritis, Sigmoid colitis, morbid obesity, functional quadriplegia presented on 5/30 with AMS and was found to have L frontal IPH. Per report, patient seized at home prior to presentation, but has not noted to be seizing during the hospitalization. Continued Goals of care discussion with family; palliative and ethics involved    HCP: Norris Rush, Son [phone]; Daughter, Heavenly Schmidt [phone]; son asks that only him or his sister, Heavenly be contacted for patient updates/information as they are both proxy's for patient (29 May 2020 23:41)    ADMISSION SCORES:  GCS: 5 [E2 VNT M3]  ICH: 2    HOSP COURSE:   5/29: DDAVP  6/9: Found to have LUE spasmodic movements. Aborted after holding LUE.   6/9: [facility] meeting with palliative care, ethics.  7/4-7/6: Remains with poor neuro exam, thin to moderate in line and oral secretions. Tolerated PS but no cuff leak. On decadron.   7/9: Family meeting. Family coming to terms with poor prognosis. Would like to meet again on 7/10      OVERNIGHT EVENTS: Afebrile. Hyperglycemic    ICU Vital Signs Last 24 Hr    ICU Vital Signs Last 24 Hrs  T(C): 37.1 (10 Jul 2020 05:00), Max: 37.2 (10 Jul 2020 03:00)  T(F): 98.7 (10 Jul 2020 05:00), Max: 99 (10 Jul 2020 03:00)  HR: 117 (10 Jul 2020 05:00) (95 - 118)  BP: 103/80 (10 Jul 2020 05:00) (95/63 - 132/92)  BP(mean): 89 (10 Jul 2020 05:00) (72 - 106)  RR: 23 (10 Jul 2020 05:00) (17 - 24)  SpO2: 96% (10 Jul 2020 05:00) (93% - 100%)      07-08-20 @ 07:01  -  07-09-20 @ 07:00  --------------------------------------------------------  IN: 1660 mL / OUT: 1150 mL / NET: 510 mL    07-09-20 @ 07:01  -  07-10-20 @ 06:26  --------------------------------------------------------  IN: 1940 mL / OUT: 775 mL / NET: 1165 mL      Mode: AC/ CMV (Assist Control/ Continuous Mandatory Ventilation), RR (machine): 14, TV (machine): 450, FiO2: 30, PEEP: 5, ITime: 1, MAP: 10, PIP: 18  acetaminophen    Suspension .. 650 milliGRAM(s) Oral every 6 hours PRN  amLODIPine   Tablet 10 milliGRAM(s) Oral daily  chlorhexidine 0.12% Liquid 15 milliLiter(s) Oral Mucosa every 12 hours  chlorhexidine 4% Liquid 1 Application(s) Topical <User Schedule>  dextrose 5%. 1000 milliLiter(s) (50 mL/Hr) IV Continuous <Continuous>  dextrose 50% Injectable 25 Gram(s) IV Push once  doxazosin 4 milliGRAM(s) Oral at bedtime  enoxaparin Injectable 30 milliGRAM(s) SubCutaneous two times a day  gabapentin   Solution 300 milliGRAM(s) Oral every 8 hours  glycopyrrolate Injectable 0.2 milliGRAM(s) IV Push three times a day PRN  insulin lispro (HumaLOG) corrective regimen sliding scale   SubCutaneous every 6 hours  insulin NPH human recombinant 12 Unit(s) SubCutaneous <User Schedule>  insulin NPH human recombinant 18 Unit(s) SubCutaneous <User Schedule>  insulin NPH human recombinant 8 Unit(s) SubCutaneous <User Schedule>  insulin NPH human recombinant 52 Unit(s) SubCutaneous <User Schedule>  lacosamide Solution 200 milliGRAM(s) Oral two times a day  levETIRAcetam  Solution 1000 milliGRAM(s) Oral two times a day  levothyroxine 150 MICROGram(s) Oral daily  metoprolol tartrate 25 milliGRAM(s) Oral every 6 hours  modafinil 200 milliGRAM(s) Oral daily  pantoprazole   Suspension 40 milliGRAM(s) Oral daily  phenytoin   Suspension 250 milliGRAM(s) Oral every 8 hours  simvastatin 20 milliGRAM(s) Oral at bedtime  sodium chloride 0.9% lock flush 10 milliLiter(s) IV Push every 1 hour PRN  traMADol 50 milliGRAM(s) Oral every 4 hours PRN                          9.0    11.83 )-----------( 428      ( 08 Jul 2020 22:27 )             29.8     07-08    139  |  101  |  20  ----------------------------<  133<H>  4.1   |  23  |  0.56    Ca    9.5      08 Jul 2020 22:27  Phos  3.7     07-08  Mg     2.0     07-08      PHYSICAL EXAM:  Neurological: Intubated, EO to noxious stim, does not track, dysconjugate gaze, pupils 3mm reactive, +cough/gag, does not follow commands, upper extremities tremors when stimulate left UE spontaneous, withdrew LE    Pulmonary: No respiratory distress, diminished  Cardiovascular: S1/S2  Gastrointestinal: soft, non-distended, non-tender  Abd: soft, non-tender, nl bs ,+ skin lesion (surgical scar midline)  Extremities: Warm, dry  Skin: Skin tear on buttocks. Extensive bruising on extremities on trunk; present on admission      CULTURES:  Culture Results:   Normal Respiratory Perla present (06-29 @ 22:28)  Culture Results:   No Growth Final (06-29 @ 22:02)    RADIOLOGY & ADDITIONAL TESTS:

## 2020-07-11 LAB
GLUCOSE BLDC GLUCOMTR-MCNC: 142 MG/DL — HIGH (ref 70–99)
GLUCOSE BLDC GLUCOMTR-MCNC: 143 MG/DL — HIGH (ref 70–99)
GLUCOSE BLDC GLUCOMTR-MCNC: 147 MG/DL — HIGH (ref 70–99)
GLUCOSE BLDC GLUCOMTR-MCNC: 180 MG/DL — HIGH (ref 70–99)
GLUCOSE BLDC GLUCOMTR-MCNC: 180 MG/DL — HIGH (ref 70–99)

## 2020-07-11 PROCEDURE — 71045 X-RAY EXAM CHEST 1 VIEW: CPT | Mod: 26,77

## 2020-07-11 PROCEDURE — 71045 X-RAY EXAM CHEST 1 VIEW: CPT | Mod: 26

## 2020-07-11 PROCEDURE — 99233 SBSQ HOSP IP/OBS HIGH 50: CPT | Mod: GC

## 2020-07-11 RX ORDER — CHLORHEXIDINE GLUCONATE 213 G/1000ML
15 SOLUTION TOPICAL EVERY 12 HOURS
Refills: 0 | Status: DISCONTINUED | OUTPATIENT
Start: 2020-07-11 | End: 2020-07-12

## 2020-07-11 RX ORDER — LACOSAMIDE 50 MG/1
200 TABLET ORAL EVERY 12 HOURS
Refills: 0 | Status: DISCONTINUED | OUTPATIENT
Start: 2020-07-11 | End: 2020-07-15

## 2020-07-11 RX ORDER — INSULIN LISPRO 100/ML
VIAL (ML) SUBCUTANEOUS EVERY 6 HOURS
Refills: 0 | Status: DISCONTINUED | OUTPATIENT
Start: 2020-07-11 | End: 2020-07-14

## 2020-07-11 RX ORDER — LEVETIRACETAM 250 MG/1
1000 TABLET, FILM COATED ORAL EVERY 12 HOURS
Refills: 0 | Status: DISCONTINUED | OUTPATIENT
Start: 2020-07-11 | End: 2020-07-15

## 2020-07-11 RX ORDER — LEVOTHYROXINE SODIUM 125 MCG
150 TABLET ORAL AT BEDTIME
Refills: 0 | Status: DISCONTINUED | OUTPATIENT
Start: 2020-07-11 | End: 2020-07-13

## 2020-07-11 RX ORDER — PANTOPRAZOLE SODIUM 20 MG/1
40 TABLET, DELAYED RELEASE ORAL DAILY
Refills: 0 | Status: DISCONTINUED | OUTPATIENT
Start: 2020-07-11 | End: 2020-07-13

## 2020-07-11 RX ORDER — NYSTATIN CREAM 100000 [USP'U]/G
1 CREAM TOPICAL
Refills: 0 | Status: DISCONTINUED | OUTPATIENT
Start: 2020-07-11 | End: 2020-07-13

## 2020-07-11 RX ORDER — METOPROLOL TARTRATE 50 MG
5 TABLET ORAL EVERY 6 HOURS
Refills: 0 | Status: DISCONTINUED | OUTPATIENT
Start: 2020-07-11 | End: 2020-07-12

## 2020-07-11 RX ADMIN — Medication 25 MILLIGRAM(S): at 06:02

## 2020-07-11 RX ADMIN — Medication 25 MILLIGRAM(S): at 12:50

## 2020-07-11 RX ADMIN — Medication 150 MICROGRAM(S): at 22:04

## 2020-07-11 RX ADMIN — ENOXAPARIN SODIUM 30 MILLIGRAM(S): 100 INJECTION SUBCUTANEOUS at 18:28

## 2020-07-11 RX ADMIN — Medication 2: at 18:28

## 2020-07-11 RX ADMIN — GABAPENTIN 300 MILLIGRAM(S): 400 CAPSULE ORAL at 14:43

## 2020-07-11 RX ADMIN — Medication 4 MILLIGRAM(S): at 22:05

## 2020-07-11 RX ADMIN — CHLORHEXIDINE GLUCONATE 15 MILLILITER(S): 213 SOLUTION TOPICAL at 18:28

## 2020-07-11 RX ADMIN — LEVETIRACETAM 1000 MILLIGRAM(S): 250 TABLET, FILM COATED ORAL at 06:02

## 2020-07-11 RX ADMIN — HUMAN INSULIN 52 UNIT(S): 100 INJECTION, SUSPENSION SUBCUTANEOUS at 06:03

## 2020-07-11 RX ADMIN — NYSTATIN CREAM 1 APPLICATION(S): 100000 CREAM TOPICAL at 18:29

## 2020-07-11 RX ADMIN — GABAPENTIN 300 MILLIGRAM(S): 400 CAPSULE ORAL at 22:05

## 2020-07-11 RX ADMIN — Medication 150 MICROGRAM(S): at 06:02

## 2020-07-11 RX ADMIN — HUMAN INSULIN 12 UNIT(S): 100 INJECTION, SUSPENSION SUBCUTANEOUS at 00:41

## 2020-07-11 RX ADMIN — HYDROMORPHONE HYDROCHLORIDE 0.5 MILLIGRAM(S): 2 INJECTION INTRAMUSCULAR; INTRAVENOUS; SUBCUTANEOUS at 22:04

## 2020-07-11 RX ADMIN — LEVETIRACETAM 400 MILLIGRAM(S): 250 TABLET, FILM COATED ORAL at 19:38

## 2020-07-11 RX ADMIN — AMLODIPINE BESYLATE 10 MILLIGRAM(S): 2.5 TABLET ORAL at 06:02

## 2020-07-11 RX ADMIN — Medication 650 MILLIGRAM(S): at 08:10

## 2020-07-11 RX ADMIN — ENOXAPARIN SODIUM 30 MILLIGRAM(S): 100 INJECTION SUBCUTANEOUS at 06:02

## 2020-07-11 RX ADMIN — Medication 2: at 06:03

## 2020-07-11 RX ADMIN — LACOSAMIDE 140 MILLIGRAM(S): 50 TABLET ORAL at 19:39

## 2020-07-11 RX ADMIN — SIMVASTATIN 20 MILLIGRAM(S): 20 TABLET, FILM COATED ORAL at 22:05

## 2020-07-11 RX ADMIN — GABAPENTIN 300 MILLIGRAM(S): 400 CAPSULE ORAL at 06:02

## 2020-07-11 RX ADMIN — PANTOPRAZOLE SODIUM 40 MILLIGRAM(S): 20 TABLET, DELAYED RELEASE ORAL at 14:43

## 2020-07-11 RX ADMIN — Medication 2: at 00:42

## 2020-07-11 RX ADMIN — Medication 0.5 MILLIGRAM(S): at 22:04

## 2020-07-11 RX ADMIN — Medication 25 MILLIGRAM(S): at 00:41

## 2020-07-11 RX ADMIN — Medication 250 MILLIGRAM(S): at 06:01

## 2020-07-11 RX ADMIN — Medication 250 MILLIGRAM(S): at 14:43

## 2020-07-11 RX ADMIN — CHLORHEXIDINE GLUCONATE 15 MILLILITER(S): 213 SOLUTION TOPICAL at 06:02

## 2020-07-11 RX ADMIN — PANTOPRAZOLE SODIUM 40 MILLIGRAM(S): 20 TABLET, DELAYED RELEASE ORAL at 12:49

## 2020-07-11 RX ADMIN — LACOSAMIDE 200 MILLIGRAM(S): 50 TABLET ORAL at 06:00

## 2020-07-11 RX ADMIN — MODAFINIL 200 MILLIGRAM(S): 200 TABLET ORAL at 12:50

## 2020-07-11 RX ADMIN — Medication 250 MILLIGRAM(S): at 22:05

## 2020-07-11 NOTE — PROGRESS NOTE ADULT - PROBLEM SELECTOR PLAN 2
Condition: ICH  Degree: severe  Active treatment: No intervention  Clinical impact on complexity: significant

## 2020-07-11 NOTE — PROGRESS NOTE ADULT - ASSESSMENT
63 Y/O multiple medical issues as described above admitted with altered mental status  CTH with left frontal ICH/SAH/IVH.  Called for goals of care 65 Y/O multiple medical issues as described above admitted with altered mental status  CTH with left frontal ICH/SAH/IVH. Palliative care consulted for goals of care. Pt transferred to PCU yesterday for symptom managment, compassionate extubation planned for Wednesday.

## 2020-07-11 NOTE — PROGRESS NOTE ADULT - PROBLEM SELECTOR PLAN 4
PPSv2 prior to admission: 30  Current functional PPSv2: 10-20  Nursing care required: Total assistance with all ADLs  Code status documented: DNR  A review of the paper chart has been conducted  HCP form present:               Yes and valid [x]               Yes but invalid []                No []   MOLST present:                   Yes and valid [x]               Yes but invalid []                No []  Incapacity form present:   Yes and valid [x]                Yes but invalid []                No []                 N/A []      Family Health Care Decision Act (FHCDA) Surrogate Decision Maker Hierarchy in the absence of a health care agent  [facility] Article 83 Guardian-->Spouse or domestic partner--> Adult child-->Parent-->Sibling--> Close friend PPSv2 prior to admission: 30  Current functional PPSv2: 10-20  Nursing care required: Total assistance with all ADLs  Code status documented: DNR  A review of the paper chart has been conducted  HCP form present:               Yes and valid [x]               Yes but invalid []                No []   MOLST present:                   Yes and valid [x]               Yes but invalid []                No []  Incapacity form present:   Yes and valid [x]                Yes but invalid []                No []                 N/A []

## 2020-07-11 NOTE — PROGRESS NOTE ADULT - PROBLEM SELECTOR PLAN 5
Actions:  [x] Rapport building     [x] Symptom assessment    [x] Eliciting preferences of goals   [x] Prognostic understanding    [x] Emotional Support  [] Coping skill development  []  Other  Interdisciplinary Referrals: See below  Communication: d/w NSCU team Fellow and SW  Documentation Review: [x] Primary Team [x] Consultants [] Interdisciplinary team    Total ACP time today > 76 min Continue symptom management. Plan for compassionate extubation this Wednesday. See goals of care note for details. Continue symptom management. OGT feeds stopped due to high risk for aspiration. Discussed with HCP, Heavenly. Plan for compassionate extubation this Wednesday. See goals of care note for details. Continue symptom management. OGT feeds stopped due to high risk for aspiration. Discussed with HCP, Heavenly, who is agreeable. Plan for compassionate extubation this Wednesday. See goals of care note for details.

## 2020-07-11 NOTE — PROGRESS NOTE ADULT - SUBJECTIVE AND OBJECTIVE BOX
HPI:  63 yo F with AMS since this morning.  Pt. was confused, didn't recognize son this morning.  He notes she was sleeping more than usual yesterday, which tipped off son that something was wrong.  At 11 am pt. was gasping for air like she was seizing. 2 hours ago pt. was dry heaving, her chest was going up and down and she started shaking like she was having a seizure.  Pt. has no history of seizures per son.  		PMH: hypothyroidism, HTN, IDDM, COPD/CHRIS on home O2, RA (on Prednisone), SBO s/p ex lap with lysis of adhesions c/b evisceration,  recent hospitalization in 3/2020 with encephalopathy, significantly hypothyroid (, without myxedema coma), small (stable) right sided SDH, UTI, catatonic reaction to VPA (now resolved) and DKA and psychiatric issues, 2020 hospitalization for GIB, Emphysematous cystitis, endometritis, Sigmoid colitis, morbid obesity, functional quadriplegia    HCP: Norris Rush, Son [phone]; Daughter, Heavenly Schmidt [phone]; son asks that only him or his sister, Heavenly be contacted for patient updates/information as they are both proxy's for patient (29 May 2020 23:41)    PERTINENT PM/SXH:   Diverticulosis  Cellulitis  CVA (Cerebral Vascular Accident)  RA (Rheumatoid Arthritis)  Tooth Abscess  Arthritis  Cigarette Smoker  Chronic Asthma  Adult Hypothyroidism  Borderline Diabetes Mellitus  High Cholesterol  H/O: HTN    Wrist Disorder  History of  Section    FAMILY HISTORY:  No pertinent family history in first degree relatives    ITEMS NOT CHECKED ARE NOT PRESENT    SOCIAL HISTORY:   Significant other/partner[ ]  Children[ ]  Jain/Spirituality:  Substance hx:  [ ]   Tobacco hx:  [ ]   Alcohol hx: [ ]   Home Opioid hx:  [ ] I-Stop Reference No:  Living Situation: [ ]Home  [ ]Long term care  [ ]Rehab [ ]Other    ADVANCE DIRECTIVES:    DNR  MOLST  [ ]  Living Will  [ ]   DECISION MAKER(s):  [ ] Health Care Proxy(s)  [ ] Surrogate(s)  [ ] Guardian           Name(s): Phone Number(s):    BASELINE (I)ADL(s) (prior to admission):  [place]: [ ]Total  [ ] Moderate [ ]Dependent    Allergies    Depakote (Other)  Seroquel (Other (Severe))    Intolerances    MEDICATIONS  (STANDING):  amLODIPine   Tablet 10 milliGRAM(s) Oral daily  chlorhexidine 0.12% Liquid 15 milliLiter(s) Oral Mucosa every 12 hours  chlorhexidine 4% Liquid 1 Application(s) Topical <User Schedule>  dextrose 5%. 1000 milliLiter(s) (50 mL/Hr) IV Continuous <Continuous>  dextrose 50% Injectable 25 Gram(s) IV Push once  doxazosin 4 milliGRAM(s) Oral at bedtime  enoxaparin Injectable 30 milliGRAM(s) SubCutaneous two times a day  gabapentin   Solution 300 milliGRAM(s) Oral every 8 hours  insulin lispro (HumaLOG) corrective regimen sliding scale   SubCutaneous every 6 hours  insulin NPH human recombinant 12 Unit(s) SubCutaneous <User Schedule>  insulin NPH human recombinant 18 Unit(s) SubCutaneous <User Schedule>  insulin NPH human recombinant 8 Unit(s) SubCutaneous <User Schedule>  insulin NPH human recombinant 52 Unit(s) SubCutaneous <User Schedule>  lacosamide Solution 200 milliGRAM(s) Oral two times a day  levETIRAcetam  Solution 1000 milliGRAM(s) Oral two times a day  levothyroxine 150 MICROGram(s) Oral daily  metoprolol tartrate 25 milliGRAM(s) Oral every 6 hours  modafinil 200 milliGRAM(s) Oral daily  pantoprazole   Suspension 40 milliGRAM(s) Oral daily  phenytoin   Suspension 250 milliGRAM(s) Oral every 8 hours  simvastatin 20 milliGRAM(s) Oral at bedtime    MEDICATIONS  (PRN):  acetaminophen    Suspension .. 650 milliGRAM(s) Oral every 6 hours PRN Temp greater or equal to 38C (100.4F), Mild Pain (1 - 3)  glycopyrrolate Injectable 0.2 milliGRAM(s) IV Push three times a day PRN secretions  sodium chloride 0.9% lock flush 10 milliLiter(s) IV Push every 1 hour PRN Pre/post blood products, medications, blood draw, and to maintain line patency  traMADol 50 milliGRAM(s) Oral every 4 hours PRN Moderate Pain (4 - 6)    PRESENT SYMPTOMS: [ ]Unable to obtain due to poor mentation   Source if other than patient:  [ ]Family   [ ]Team     Pain: [ ]yes [ ]no  QOL impact -   Location -                    Aggravating factors -  Quality -  Radiation -  Timing-  Severity (0-10 scale):  Minimal acceptable level (0-10 scale):     PAIN AD Score:     [email or web address].pdf (press ctrl +  left click to view)    Dyspnea:                           [ ]Mild [ ]Moderate [ ]Severe  Anxiety:                             [ ]Mild [ ]Moderate [ ]Severe  Fatigue:                             [ ]Mild [ ]Moderate [ ]Severe  Nausea:                             [ ]Mild [ ]Moderate [ ]Severe  Loss of appetite:              [ ]Mild [ ]Moderate [ ]Severe  Constipation:                    [ ]Mild [ ]Moderate [ ]Severe    Other Symptoms:  [ ]All other review of systems negative     Palliative Performance Status Version 2:         %    http://[facility].org/files/news/palliative_performance_scale_[email or web address].pdf  PHYSICAL EXAM:  Vital Signs Last 24 Hrs  T(C): 36.9 (10 Jul 2020 07:00), Max: 37.2 (10 Jul 2020 03:00)  T(F): 98.5 (10 Jul 2020 07:00), Max: 99 (10 Jul 2020 03:00)  HR: 114 (10 Jul 2020 12:12) (109 - 119)  BP: 125/84 (10 Jul 2020 11:07) (103/80 - 148/98)  BP(mean): 98 (10 Jul 2020 11:07) (89 - 108)  RR: 28 (10 Jul 2020 12:12) (18 - 28)  SpO2: 95% (10 Jul 2020 12:12) (93% - 100%) I&O's Summary    2020 07:01  -  10 Jul 2020 07:00  --------------------------------------------------------  IN: 2070 mL / OUT: 1025 mL / NET: 1045 mL    10 Jul 2020 07:01  -  10 Jul 2020 14:55  --------------------------------------------------------  IN: 195 mL / OUT: 0 mL / NET: 195 mL      GENERAL:  [ ]Alert  [ ]Oriented x   [ ]Lethargic  [ ]Cachexia  [ ]Unarousable  [ ]Verbal  [ ]Non-Verbal  Behavioral:   [ ] Anxiety  [ ] Delirium [ ] Agitation [ ] Other  HEENT:  [ ]Normal   [ ]Dry mouth   [ ]ET Tube/Trach  [ ]Oral lesions  PULMONARY:   [ ]Clear [ ]Tachypnea  [ ]Audible excessive secretions   [ ]Rhonchi        [ ]Right [ ]Left [ ]Bilateral  [ ]Crackles        [ ]Right [ ]Left [ ]Bilateral  [ ]Wheezing     [ ]Right [ ]Left [ ]Bilatera  [ ]Diminished breath sounds [ ]right [ ]left [ ]bilateral  CARDIOVASCULAR:    [ ]Regular [ ]Irregular [ ]Tachy  [ ]Ubaldo [ ]Murmur [ ]Other  GASTROINTESTINAL:  [ ]Soft  [ ]Distended   [ ]+BS  [ ]Non tender [ ]Tender  [ ]PEG [ ]OGT/ NGT  Last BM:     GENITOURINARY:  [ ]Normal [ ] Incontinent   [ ]Oliguria/Anuria   [ ]Antoine  MUSCULOSKELETAL:   [ ]Normal   [ ]Weakness  [ ]Bed/Wheelchair bound [ ]Edema  NEUROLOGIC:   [ ]No focal deficits  [ ]Cognitive impairment  [ ]Dysphagia [ ]Dysarthria [ ]Paresis [ ]Other   SKIN:   [ ]Normal    [ ]Rash  [ ]Pressure ulcer(s)       Present on admission [ ]y [ ]n    CRITICAL CARE:  [ ] Shock Present  [ ]Septic [ ]Cardiogenic [ ]Neurologic [ ]Hypovolemic  [ ]  Vasopressors [ ]  Inotropes   [ ]Respiratory failure present [ ]Mechanical ventilation [ ]Non-invasive ventilatory support [ ]High flow  [ ]Acute  [ ]Chronic [ ]Hypoxic  [ ]Hypercarbic [ ]Other  [ ]Other organ failure     LABS:                        9.0    11.83 )-----------( 428      ( 2020 22:27 )             29.8   07-08    139  |  101  |  20  ----------------------------<  133<H>  4.1   |  23  |  0.56    Ca    9.5      2020 22:27  Phos  3.7     07-08  Mg     2.0     07-08          RADIOLOGY & ADDITIONAL STUDIES:    PROTEIN CALORIE MALNUTRITION PRESENT: [ ]mild [ ]moderate [ ]severe [ ]underweight [ ]morbid obesity  https://www.andeal.org/vault/8955/web/files/ONC/Table_Clinical%20Characteristics%20to%20Document%20Malnutrition-White%20JV%20et%20al%787903.pdf    Height (cm): 162.6 (20 @ 00:49), 152.4 (20 @ 19:18), 154.9 (20 @ 21:48)  Weight (kg): 99.6 (20 @ 00:49), 136.1 (20 @ 19:18), 98.2 (20 @ 23:57)  BMI (kg/m2): 37.7 (20 @ 00:49), 51.5 (20 @ 00:49), 58.6 (20 @ 19:18)    [ ]PPSV2 < or = to 30% [ ]significant weight loss  [ ]poor nutritional intake  [ ]anasarca     Albumin, Serum: 3.5 g/dL (20 @ 05:20)   [ ]Artificial Nutrition      REFERRALS:   [ ]Chaplaincy  [ ]Hospice  [ ]Child Life  [ ]Social Work  [ ]Case management [ ]Holistic Therapy   HPI:  63 yo F with AMS since this morning.  Pt. was confused, didn't recognize son this morning.  He notes she was sleeping more than usual yesterday, which tipped off son that something was wrong.  At 11 am pt. was gasping for air like she was seizing. 2 hours ago pt. was dry heaving, her chest was going up and down and she started shaking like she was having a seizure.  Pt. has no history of seizures per son.  		PMH: hypothyroidism, HTN, IDDM, COPD/CHRIS on home O2, RA (on Prednisone), SBO s/p ex lap with lysis of adhesions c/b evisceration,  recent hospitalization in 3/2020 with encephalopathy, significantly hypothyroid (, without myxedema coma), small (stable) right sided SDH, UTI, catatonic reaction to VPA (now resolved) and DKA and psychiatric issues, 2020 hospitalization for GIB, Emphysematous cystitis, endometritis, Sigmoid colitis, morbid obesity, functional quadriplegia    HCP: Norris Rush, Son [phone]; DaughterHeavenly [phone]; son asks that only him or his sister, Heavenly be contacted for patient updates/information as they are both proxy's for patient (29 May 2020 23:41)    PERTINENT PM/SXH:   Diverticulosis  Cellulitis  CVA (Cerebral Vascular Accident)  RA (Rheumatoid Arthritis)  Tooth Abscess  Arthritis  Cigarette Smoker  Chronic Asthma  Adult Hypothyroidism  Borderline Diabetes Mellitus  High Cholesterol  H/O: HTN    Wrist Disorder  History of  Section    FAMILY HISTORY:  No pertinent family history in first degree relatives    ITEMS NOT CHECKED ARE NOT PRESENT    SOCIAL HISTORY:   Significant other/partner[ ]  Children[ ]  Jain/Spirituality:  Substance hx:  [ ]   Tobacco hx:  [ ]   Alcohol hx: [ ]   Home Opioid hx:  [ ] I-Stop Reference No:  Living Situation: [ ]Home  [ ]Long term care  [ ]Rehab [ ]Other    ADVANCE DIRECTIVES:    DNR  MOLST  [ ]  Living Will  [ ]   DECISION MAKER(s):  [ ] Health Care Proxy(s)  [ ] Surrogate(s)  [ ] Guardian           Name(s): Phone Number(s):    BASELINE (I)ADL(s) (prior to admission):  [place]: [ ]Total  [ ] Moderate [ ]Dependent    Allergies    Depakote (Other)  Seroquel (Other (Severe))    Intolerances    MEDICATIONS  (STANDING):  amLODIPine   Tablet 10 milliGRAM(s) Oral daily  chlorhexidine 0.12% Liquid 15 milliLiter(s) Oral Mucosa every 12 hours  chlorhexidine 4% Liquid 1 Application(s) Topical <User Schedule>  dextrose 5%. 1000 milliLiter(s) (50 mL/Hr) IV Continuous <Continuous>  dextrose 50% Injectable 25 Gram(s) IV Push once  doxazosin 4 milliGRAM(s) Oral at bedtime  enoxaparin Injectable 30 milliGRAM(s) SubCutaneous two times a day  gabapentin   Solution 300 milliGRAM(s) Oral every 8 hours  insulin lispro (HumaLOG) corrective regimen sliding scale   SubCutaneous every 6 hours  insulin NPH human recombinant 12 Unit(s) SubCutaneous <User Schedule>  insulin NPH human recombinant 18 Unit(s) SubCutaneous <User Schedule>  insulin NPH human recombinant 8 Unit(s) SubCutaneous <User Schedule>  insulin NPH human recombinant 52 Unit(s) SubCutaneous <User Schedule>  lacosamide Solution 200 milliGRAM(s) Oral two times a day  levETIRAcetam  Solution 1000 milliGRAM(s) Oral two times a day  levothyroxine 150 MICROGram(s) Oral daily  metoprolol tartrate 25 milliGRAM(s) Oral every 6 hours  modafinil 200 milliGRAM(s) Oral daily  pantoprazole   Suspension 40 milliGRAM(s) Oral daily  phenytoin   Suspension 250 milliGRAM(s) Oral every 8 hours  simvastatin 20 milliGRAM(s) Oral at bedtime    MEDICATIONS  (PRN):  acetaminophen    Suspension .. 650 milliGRAM(s) Oral every 6 hours PRN Temp greater or equal to 38C (100.4F), Mild Pain (1 - 3)  glycopyrrolate Injectable 0.2 milliGRAM(s) IV Push three times a day PRN secretions  sodium chloride 0.9% lock flush 10 milliLiter(s) IV Push every 1 hour PRN Pre/post blood products, medications, blood draw, and to maintain line patency  traMADol 50 milliGRAM(s) Oral every 4 hours PRN Moderate Pain (4 - 6)    PRESENT SYMPTOMS: [ ]Unable to obtain due to poor mentation   Source if other than patient:  [ ]Family   [ ]Team     Pain: [ ]yes [ ]no  QOL impact -   Location -                    Aggravating factors -  Quality -  Radiation -  Timing-  Severity (0-10 scale):  Minimal acceptable level (0-10 scale):     PAIN AD Score:     http://geriatrictoolkit.[email or web address]/cog/painad.pdf (press ctrl +  left click to view)    Dyspnea:                           [ ]Mild [ ]Moderate [ ]Severe  Anxiety:                             [ ]Mild [ ]Moderate [ ]Severe  Fatigue:                             [ ]Mild [ ]Moderate [ ]Severe  Nausea:                             [ ]Mild [ ]Moderate [ ]Severe  Loss of appetite:              [ ]Mild [ ]Moderate [ ]Severe  Constipation:                    [ ]Mild [ ]Moderate [ ]Severe    Other Symptoms:  [ ]All other review of systems negative     Palliative Performance Status Version 2:         %    http://npcrc.org/files/news/palliative_performance_scale_ppsv2.pdf  PHYSICAL EXAM:  Vital Signs Last 24 Hrs  T(C): 36.9 (10 Jul 2020 07:00), Max: 37.2 (10 Jul 2020 03:00)  T(F): 98.5 (10 Jul 2020 07:00), Max: 99 (10 Jul 2020 03:00)  HR: 114 (10 Jul 2020 12:12) (109 - 119)  BP: 125/84 (10 Jul 2020 11:07) (103/80 - 148/98)  BP(mean): 98 (10 Jul 2020 11:07) (89 - 108)  RR: 28 (10 Jul 2020 12:12) (18 - 28)  SpO2: 95% (10 Jul 2020 12:12) (93% - 100%) I&O's Summary    2020 07:01  -  10 Jul 2020 07:00  --------------------------------------------------------  IN: 2070 mL / OUT: 1025 mL / NET: 1045 mL    10 Jul 2020 07:01  -  10 Jul 2020 14:56  --------------------------------------------------------  IN: 195 mL / OUT: 0 mL / NET: 195 mL      GENERAL:  [ ]Alert  [ ]Oriented x   [ ]Lethargic  [ ]Cachexia  [ ]Unarousable  [ ]Verbal  [ ]Non-Verbal  Behavioral:   [ ] Anxiety  [ ] Delirium [ ] Agitation [ ] Other  HEENT:  [ ]Normal   [ ]Dry mouth   [ ]ET Tube/Trach  [ ]Oral lesions  PULMONARY:   [ ]Clear [ ]Tachypnea  [ ]Audible excessive secretions   [ ]Rhonchi        [ ]Right [ ]Left [ ]Bilateral  [ ]Crackles        [ ]Right [ ]Left [ ]Bilateral  [ ]Wheezing     [ ]Right [ ]Left [ ]Bilatera  [ ]Diminished breath sounds [ ]right [ ]left [ ]bilateral  CARDIOVASCULAR:    [ ]Regular [ ]Irregular [ ]Tachy  [ ]Ubaldo [ ]Murmur [ ]Other  GASTROINTESTINAL:  [ ]Soft  [ ]Distended   [ ]+BS  [ ]Non tender [ ]Tender  [ ]PEG [ ]OGT/ NGT  Last BM:     GENITOURINARY:  [ ]Normal [ ] Incontinent   [ ]Oliguria/Anuria   [ ]Antoine  MUSCULOSKELETAL:   [ ]Normal   [ ]Weakness  [ ]Bed/Wheelchair bound [ ]Edema  NEUROLOGIC:   [ ]No focal deficits  [ ]Cognitive impairment  [ ]Dysphagia [ ]Dysarthria [ ]Paresis [ ]Other   SKIN:   [ ]Normal    [ ]Rash  [ ]Pressure ulcer(s)       Present on admission [ ]y [ ]n    CRITICAL CARE:  [ ] Shock Present  [ ]Septic [ ]Cardiogenic [ ]Neurologic [ ]Hypovolemic  [ ]  Vasopressors [ ]  Inotropes   [ ]Respiratory failure present [ ]Mechanical ventilation [ ]Non-invasive ventilatory support [ ]High flow  [ ]Acute  [ ]Chronic [ ]Hypoxic  [ ]Hypercarbic [ ]Other  [ ]Other organ failure     LABS:                        9.0    11.83 )-----------( 428      ( 2020 22:27 )             29.8   07-08    139  |  101  |  20  ----------------------------<  133<H>  4.1   |  23  |  0.56    Ca    9.5      2020 22:27  Phos  3.7     07-08  Mg     2.0     07-08          RADIOLOGY & ADDITIONAL STUDIES:    PROTEIN CALORIE MALNUTRITION PRESENT: [ ]mild [ ]moderate [ ]severe [ ]underweight [ ]morbid obesity  https://www.andeal.org/vault/2440/web/files/ONC/Table_Clinical%20Characteristics%20to%20Document%20Malnutrition-White%20JV%20et%20al%273415.pdf    Height (cm): 162.6 (20 @ 00:49), 152.4 (20 @ 19:18), 154.9 (20 @ 21:48)  Weight (kg): 99.6 (20 @ 00:49), 136.1 (20 @ 19:18), 98.2 (20 @ 23:57)  BMI (kg/m2): 37.7 (20 @ 00:49), 51.5 (20 @ 00:49), 58.6 (20 @ 19:18)    [ ]PPSV2 < or = to 30% [ ]significant weight loss  [ ]poor nutritional intake  [ ]anasarca     Albumin, Serum: 3.5 g/dL (07-01-20 @ 05:20)   [ ]Artificial Nutrition      REFERRALS:   [ ]Chaplaincy  [ ]Hospice  [ ]Child Life  [ ]Social Work  [ ]Case management [ ]Holistic Therapy     Goals of Care Document: GAP TEAM PALLIATIVE CARE UNIT PROGRESS NOTE:      [  ] Patient on hospice program.    INDICATION FOR PALLIATIVE CARE UNIT SERVICES: symptom management, plan for compassionate extubation on Wednesday    INTERVAL HPI/OVERNIGHT EVENTS: Overnight OGT feeds held due to risk for aspiration. Per RN tube feeds seen in mouth requiring suctioning, although no residuals measured. Antoine placed for increased comfort care.    DNR on chart: Yes    Allergies    Depakote (Other)  Seroquel (Other (Severe))    Intolerances    MEDICATIONS  (STANDING):  amLODIPine   Tablet 10 milliGRAM(s) Oral daily  dextrose 5%. 1000 milliLiter(s) (50 mL/Hr) IV Continuous <Continuous>  dextrose 50% Injectable 25 Gram(s) IV Push once  doxazosin 4 milliGRAM(s) Oral at bedtime  enoxaparin Injectable 30 milliGRAM(s) SubCutaneous two times a day  gabapentin   Solution 300 milliGRAM(s) Oral every 8 hours  insulin lispro (HumaLOG) corrective regimen sliding scale   SubCutaneous every 6 hours  insulin NPH human recombinant 18 Unit(s) SubCutaneous <User Schedule>  insulin NPH human recombinant 8 Unit(s) SubCutaneous <User Schedule>  insulin NPH human recombinant 52 Unit(s) SubCutaneous <User Schedule>  insulin NPH human recombinant 12 Unit(s) SubCutaneous <User Schedule>  lacosamide Solution 200 milliGRAM(s) Oral two times a day  levETIRAcetam  Solution 1000 milliGRAM(s) Oral two times a day  levothyroxine 150 MICROGram(s) Oral daily  metoprolol tartrate 25 milliGRAM(s) Oral every 6 hours  modafinil 200 milliGRAM(s) Oral daily  nystatin Powder 1 Application(s) Topical two times a day  pantoprazole   Suspension 40 milliGRAM(s) Oral daily  phenytoin   Suspension 250 milliGRAM(s) Oral every 8 hours  simvastatin 20 milliGRAM(s) Oral at bedtime    MEDICATIONS  (PRN):  acetaminophen    Suspension .. 650 milliGRAM(s) Oral every 6 hours PRN Temp greater or equal to 38C (100.4F), Mild Pain (1 - 3)  glycopyrrolate Injectable 0.2 milliGRAM(s) IV Push three times a day PRN secretions  haloperidol    Injectable 0.5 milliGRAM(s) IV Push every 6 hours PRN Nausea and/or Vomiting  haloperidol    Injectable 0.5 milliGRAM(s) IV Push every 6 hours PRN Agitation  HYDROmorphone  Injectable 0.5 milliGRAM(s) IV Push every 2 hours PRN Severe Pain (7 - 10)  HYDROmorphone  Injectable 0.5 milliGRAM(s) IV Push every 2 hours PRN Respiratory Rate GREATER THAN 22 breaths per minute  LORazepam   Injectable 0.5 milliGRAM(s) IV Push every 6 hours PRN Anxiety/refractory dyspnea  sodium chloride 0.9% lock flush 10 milliLiter(s) IV Push every 1 hour PRN Pre/post blood products, medications, blood draw, and to maintain line patency  traMADol 50 milliGRAM(s) Oral every 4 hours PRN Moderate Pain (4 - 6)    ITEMS UNCHECKED ARE NOT PRESENT    PRESENT SYMPTOMS: [x]Unable to obtain due to poor mentation due to sedation on mechanical ventilation  Source if other than patient:  [ ]Family   [x]Team     Pain: [ ] yes [ ] no  QOL impact -   Location -                    Aggravating factors -  Quality -  Radiation -  Timing-  Severity (0-10 scale):  Minimal acceptable level (0-10 scale):     Dyspnea:                           [ ]Mild [ ]Moderate [ ]Severe  Anxiety:                             [ ]Mild [ ]Moderate [ ]Severe  Fatigue:                             [ ]Mild [ ]Moderate [ ]Severe  Nausea:                             [ ]Mild [ ]Moderate [ ]Severe  Loss of appetite:              [ ]Mild [ ]Moderate [ ]Severe  Constipation:                    [ ]Mild [ ]Moderate [ ]Severe    PAINAD Score: 0    [email or web address]/painad.pdf (Ctrl +  left click to view)  		  Other Symptoms:  [ ]All other review of systems negative     Palliative Performance Status Version 2:     10    %         http://npcrc.org/files/news/palliative_performance_scale_ppsv2.pdf  PHYSICAL EXAM:  Vital Signs Last 24 Hrs  T(C): 37.8 (11 Jul 2020 08:03), Max: 38.2 (10 Jul 2020 18:45)  T(F): 100 (11 Jul 2020 08:03), Max: 100.8 (10 Jul 2020 18:45)  HR: 110 (11 Jul 2020 09:15) (108 - 119)  BP: 103/72 (11 Jul 2020 08:03) (103/72 - 144/69)  BP(mean): 78 (10 Jul 2020 15:00) (78 - 98)  RR: 18 (11 Jul 2020 08:03) (18 - 29)  SpO2: 98% (11 Jul 2020 09:15) (91% - 99%) I&O's Summary    10 Jul 2020 07:01  -  11 Jul 2020 07:00  --------------------------------------------------------  IN: 1020 mL / OUT: 1568 mL / NET: -548 mL    GENERAL:   [x] sedated [ ]Alert  [ ]Oriented x   [ ]Lethargic  [ ]Cachexia  [ ]Unarousable  [ ]Verbal  [ ]Non-Verbal  Behavioral:   [ ] Anxiety  [ ] Delirium [ ] Agitation [ ] Other  HEENT:  [ ]Normal   [ ]Dry mouth   [x]ET Tube/Trach  [ ]Oral lesions  PULMONARY:   [ ]Clear [ ]Tachypnea  [ ]Audible excessive secretions   [ ]Rhonchi        [ ]Right [ ]Left [ ]Bilateral  [ ]Crackles        [ ]Right [ ]Left [ ]Bilateral  [ ]Wheezing     [ ]Right [ ]Left [ ]Bilateral  [x ]Diminshed BS [ ]Right [ ]Left [x]Bilateral    CARDIOVASCULAR:    [ ]Regular [ ]Irregular [x ]Tachy  [ ]Ubaldo [ ]Murmur [ ]Other  GASTROINTESTINAL:  [x ]Soft  [ ]Distended   [ ]+BS  [ x]Non tender [ ]Tender  [ ]PEG [x ]OGT/ NGT   Last BM:   7/9    GENITOURINARY:  [ ]Normal [ ] Incontinent   [ ]Oliguria/Anuria   [x ]Antoine  MUSCULOSKELETAL:   [ ]Normal   [ ]Weakness  [x ]Bed/Wheelchair bound [ ]Edema  NEUROLOGIC:   unable to assess due to sedation  [ ]No focal deficits  [ ] Cognitive impairment  [ ] Dysphagia [ ]Dysarthria [ ] Paresis [ ]Other   SKIN:   [ ]Normal  [x ]Rash  underarms b/l; impaired skin integrity perineum, sacral/gluteal  [ ]Pressure ulcer(s)  [ ]y [ ]n  Present on admission      CRITICAL CARE:  [ ] Shock Present  [ ]Septic [ ]Cardiogenic [ ]Neurologic [ ]Hypovolemic  [ ]  Vasopressors [ ]  Inotropes   [x ] Respiratory failure present [x ] Mechanical Ventilation [ ] Non-invasive ventilatory support [ ] High-Flow  [ ] Acute  [x ] Chronic [ ] Hypoxic  [ ] Hypercarbic [ ] Other  [ ] Other organ failure     LABS:            RADIOLOGY & ADDITIONAL STUDIES:    PROTEIN CALORIE MALNUTRITION: [ ] mild [ ] moderate [ ] severe  [ ] underweight [ ] morbid obesity    https://www.andeal.org/vault/2440/web/files/ONC/Table_Clinical%20Characteristics%20to%20Document%20Malnutrition-White%20JV%20et%20al%142615.pdf    Height (cm): 162.6 (05-30-20 @ 00:49), 152.4 (05-29-20 @ 19:18), 154.9 (05-12-20 @ 21:48)  Weight (kg): 99.6 (05-30-20 @ 00:49), 136.1 (05-29-20 @ 19:18), 98.2 (05-12-20 @ 23:57)  BMI (kg/m2): 37.7 (05-30-20 @ 00:49), 51.5 (05-30-20 @ 00:49), 58.6 (05-29-20 @ 19:18)    [ ] PPSV2 < or = 30% [ ] significant weight loss [ ] poor nutritional intake [ ] anasarca Albumin, Serum: 3.5 g/dL (07-01-20 @ 05:20)    Artificial Nutrition [ ]     REFERRALS:   [ ]Chaplaincy  [ ] Hospice  [ ]Child Life  [ ]Social Work  [ ]Case management [ ]Holistic Therapy [ ] Physical Therapy [ ] Dietary   Goals of Care Document: GISEL Guzman (07-10-20 @ 17:20)  Goals of Care Conversation:   Advance Directives:  · Caregiver:  information could not be obtained    Conversation Discussion:  · Conversation Details  PCU Care plan delineation      HPI:  65 yo F with  hypothyroidism, HTN, IDDM, COPD/CHRIS on home O2, RA (on Prednisone), SBO s/p ex lap with lysis of adhesions c/b evisceration  history of trach and peg in the past after a protracted intubation > 30 days (reversal) ,  recent hospitalization in 3/2020 with encephalopathy, significantly hypothyroid (, without myxedema coma), small (stable) right sided SDH, UTI, catatonic reaction to VPA (now resolved) and DKA and psychiatric issues, 5/2020 hospitalization for GIB, Emphysematous cystitis, endometritis, Sigmoid colitis, morbid obesity, functional quadriplegia, a/w AMS found to have ICH now intubated > 40 days with no purposeful movement    We discussed global aspects of the patient's care with a focus on review goals and values.  I discussed  different approaches to care: curative, restorative, or purely symptom focused.  The decision makers Heavenly and Norris were present and eager to discussion options pre and post extubation.  The family was interested in possible home hospice if she survives extubation.    I recommended a purely symptom focused approach including the following:  DNAR to allow for a natural death in the event of asystole or a fatal arrythmia  DNI once extubated  No routine surveillance with blood draws  No new fever work up (no imaging, no blood cultures, no care escalation, no new antibiotics)  No artificial nutrition once the feeding is stopped in the periextubation period  No gastrostomy  No hemodialysis  APAP to address fevers  A trial of antibiotics to only address symptoms (e.g. dysuria)  A trial of IV fluids to address symptoms (e.g. seizure/myoclonus to address opiate induced neurotoxicity)  Maintenance of seizure medications  Maintenance of tramadol and gabapentin for OA.    Regarding my recommendations, they agreed to all of them.  They agreed to a transfer to the PCU as soon as bed is available  They agreed to extubation on Wednesday  They want to maintain current care until the periextubation period but realize that some adjustments may need to occur in as part of the process of extubation (feeds discontinuation  to reduce aspiration risk)  Additionally we discussed the possibility of fluid overload which would mean deescalation of IVF  Visitation regulations discussed with the PCU team and family, consensus found  Discussed symptom management and the fact that medications do not hasten death.  Explained the non-residential component of the PCU and its role in symptom management.  I explained that we would need to ask IV opioids in the event that her tramadol is not helpful since OA was a major part of her symptom burden.  Post extubation prognosis of minutes to hours offered.  Explained that she may die prior to extubation.    MOLST placed in the chart with an incapacity form  Case discussed with the fellow on call    Electronic Signatures for Addendum Section:   Arsalan Guzman) (Signed Addendum 10-Jul-2020 17:45)    ACP >16 min  Arsalan Guzman) (Signed Addendum 10-Jul-2020 18:36)    Family also agreed to no telemetry or continuous pulse ox.    Electronic Signatures:  Arsalan Guzman)  (Signed 10-Jul-2020 17:52)  	Authored: Goals of Care Conversation      Last Updated: 10-Jul-2020 18:36 by Arsalan Guzman) GAP TEAM PALLIATIVE CARE UNIT PROGRESS NOTE:      [  ] Patient on hospice program.    INDICATION FOR PALLIATIVE CARE UNIT SERVICES: symptom management, plan for compassionate extubation on Wednesday    INTERVAL HPI/OVERNIGHT EVENTS: Overnight OGT feeds held due to risk for aspiration. Per RN tube feeds seen in mouth requiring suctioning, although no residuals measured. Antoine placed for increased comfort care. This morning chest XR significant for ET tube in right main stem bronchus. RT adjusted ET tube, repeat XR pending.    DNR on chart: Yes    Allergies    Depakote (Other)  Seroquel (Other (Severe))    Intolerances    MEDICATIONS  (STANDING):  amLODIPine   Tablet 10 milliGRAM(s) Oral daily  dextrose 5%. 1000 milliLiter(s) (50 mL/Hr) IV Continuous <Continuous>  dextrose 50% Injectable 25 Gram(s) IV Push once  doxazosin 4 milliGRAM(s) Oral at bedtime  enoxaparin Injectable 30 milliGRAM(s) SubCutaneous two times a day  gabapentin   Solution 300 milliGRAM(s) Oral every 8 hours  insulin lispro (HumaLOG) corrective regimen sliding scale   SubCutaneous every 6 hours  insulin NPH human recombinant 18 Unit(s) SubCutaneous <User Schedule>  insulin NPH human recombinant 8 Unit(s) SubCutaneous <User Schedule>  insulin NPH human recombinant 52 Unit(s) SubCutaneous <User Schedule>  insulin NPH human recombinant 12 Unit(s) SubCutaneous <User Schedule>  lacosamide Solution 200 milliGRAM(s) Oral two times a day  levETIRAcetam  Solution 1000 milliGRAM(s) Oral two times a day  levothyroxine 150 MICROGram(s) Oral daily  metoprolol tartrate 25 milliGRAM(s) Oral every 6 hours  modafinil 200 milliGRAM(s) Oral daily  nystatin Powder 1 Application(s) Topical two times a day  pantoprazole   Suspension 40 milliGRAM(s) Oral daily  phenytoin   Suspension 250 milliGRAM(s) Oral every 8 hours  simvastatin 20 milliGRAM(s) Oral at bedtime    MEDICATIONS  (PRN):  acetaminophen    Suspension .. 650 milliGRAM(s) Oral every 6 hours PRN Temp greater or equal to 38C (100.4F), Mild Pain (1 - 3)  glycopyrrolate Injectable 0.2 milliGRAM(s) IV Push three times a day PRN secretions  haloperidol    Injectable 0.5 milliGRAM(s) IV Push every 6 hours PRN Nausea and/or Vomiting  haloperidol    Injectable 0.5 milliGRAM(s) IV Push every 6 hours PRN Agitation  HYDROmorphone  Injectable 0.5 milliGRAM(s) IV Push every 2 hours PRN Severe Pain (7 - 10)  HYDROmorphone  Injectable 0.5 milliGRAM(s) IV Push every 2 hours PRN Respiratory Rate GREATER THAN 22 breaths per minute  LORazepam   Injectable 0.5 milliGRAM(s) IV Push every 6 hours PRN Anxiety/refractory dyspnea  sodium chloride 0.9% lock flush 10 milliLiter(s) IV Push every 1 hour PRN Pre/post blood products, medications, blood draw, and to maintain line patency  traMADol 50 milliGRAM(s) Oral every 4 hours PRN Moderate Pain (4 - 6)    ITEMS UNCHECKED ARE NOT PRESENT    PRESENT SYMPTOMS: [x]Unable to obtain due to poor mentation due to sedation on mechanical ventilation  Source if other than patient:  [ ]Family   [x]Team     Pain: [ ] yes [ ] no  QOL impact -   Location -                    Aggravating factors -  Quality -  Radiation -  Timing-  Severity (0-10 scale):  Minimal acceptable level (0-10 scale):     Dyspnea:                           [ ]Mild [ ]Moderate [ ]Severe  Anxiety:                             [ ]Mild [ ]Moderate [ ]Severe  Fatigue:                             [ ]Mild [ ]Moderate [ ]Severe  Nausea:                             [ ]Mild [ ]Moderate [ ]Severe  Loss of appetite:              [ ]Mild [ ]Moderate [ ]Severe  Constipation:                    [ ]Mild [ ]Moderate [ ]Severe    PAINAD Score: 0    [email or web address] (Ctrl +  left click to view)  		  Other Symptoms:  [ ]All other review of systems negative     Palliative Performance Status Version 2:     10    %         http://npcrc.org/files/news/palliative_performance_scale_ppsv2.pdf  PHYSICAL EXAM:  Vital Signs Last 24 Hrs  T(C): 37.8 (11 Jul 2020 08:03), Max: 38.2 (10 Jul 2020 18:45)  T(F): 100 (11 Jul 2020 08:03), Max: 100.8 (10 Jul 2020 18:45)  HR: 110 (11 Jul 2020 09:15) (108 - 119)  BP: 103/72 (11 Jul 2020 08:03) (103/72 - 144/69)  BP(mean): 78 (10 Jul 2020 15:00) (78 - 98)  RR: 18 (11 Jul 2020 08:03) (18 - 29)  SpO2: 98% (11 Jul 2020 09:15) (91% - 99%) I&O's Summary    10 Jul 2020 07:01  -  11 Jul 2020 07:00  --------------------------------------------------------  IN: 1020 mL / OUT: 1568 mL / NET: -548 mL    GENERAL:   [x] sedated [ ]Alert  [ ]Oriented x   [ ]Lethargic  [ ]Cachexia  [ ]Unarousable  [ ]Verbal  [ ]Non-Verbal  Behavioral:   [ ] Anxiety  [ ] Delirium [ ] Agitation [ ] Other  HEENT:  [ ]Normal   [ ]Dry mouth   [x]ET Tube/Trach  [ ]Oral lesions  PULMONARY:   [ ]Clear [ ]Tachypnea  [ ]Audible excessive secretions   [ ]Rhonchi        [ ]Right [ ]Left [ ]Bilateral  [ ]Crackles        [ ]Right [ ]Left [ ]Bilateral  [ ]Wheezing     [ ]Right [ ]Left [ ]Bilateral  [x ]Diminshed BS [ ]Right [ ]Left [x]Bilateral    CARDIOVASCULAR:    [ ]Regular [ ]Irregular [x ]Tachy  [ ]Ubaldo [ ]Murmur [ ]Other  GASTROINTESTINAL:  [x ]Soft  [ ]Distended   [ ]+BS  [ x]Non tender [ ]Tender  [ ]PEG [x ]OGT/ NGT   Last BM:   7/9    GENITOURINARY:  [ ]Normal [ ] Incontinent   [ ]Oliguria/Anuria   [x ]Antoine  MUSCULOSKELETAL:   [ ]Normal   [ ]Weakness  [x ]Bed/Wheelchair bound [ ]Edema  NEUROLOGIC:   unable to assess due to sedation  [ ]No focal deficits  [ ] Cognitive impairment  [ ] Dysphagia [ ]Dysarthria [ ] Paresis [ ]Other   SKIN:   [ ]Normal  [x ]Rash  underarms b/l; impaired skin integrity perineum, sacral/gluteal  [ ]Pressure ulcer(s)  [ ]y [ ]n  Present on admission      CRITICAL CARE:  [ ] Shock Present  [ ]Septic [ ]Cardiogenic [ ]Neurologic [ ]Hypovolemic  [ ]  Vasopressors [ ]  Inotropes   [x ] Respiratory failure present [x ] Mechanical Ventilation [ ] Non-invasive ventilatory support [ ] High-Flow  [ ] Acute  [x ] Chronic [ ] Hypoxic  [ ] Hypercarbic [ ] Other  [ ] Other organ failure     LABS: none            RADIOLOGY & ADDITIONAL STUDIES:    < from: Xray Chest 1 View- PORTABLE-Routine (07.11.20 @ 09:12) >    CLINICAL INDICATION: intubated    TECHNIQUE: Frontal radiograph of the chest was obtained.    COMPARISON: 7/10/2020.    FINDINGS:     Cardiac silhouette is enlarged. Left pleural effusion. Lungs otherwise clear. No pneumothorax. Enteric tube tip below diaphragm, side port at gastroesophageal junction. Endotracheal tube tip in right mainstem bronchus.    IMPRESSION:   Endotracheal tube tip in right mainstem bronchus.        PROTEIN CALORIE MALNUTRITION: [ ] mild [ ] moderate [ ] severe  [ ] underweight [ ] morbid obesity    https://www.andeal.org/vault/2440/web/files/ONC/Table_Clinical%20Characteristics%20to%20Document%20Malnutrition-White%20JV%20et%20al%601328.pdf    Height (cm): 162.6 (05-30-20 @ 00:49), 152.4 (05-29-20 @ 19:18), 154.9 (05-12-20 @ 21:48)  Weight (kg): 99.6 (05-30-20 @ 00:49), 136.1 (05-29-20 @ 19:18), 98.2 (05-12-20 @ 23:57)  BMI (kg/m2): 37.7 (05-30-20 @ 00:49), 51.5 (05-30-20 @ 00:49), 58.6 (05-29-20 @ 19:18)    [ ] PPSV2 < or = 30% [ ] significant weight loss [ ] poor nutritional intake [ ] anasarca Albumin, Serum: 3.5 g/dL (07-01-20 @ 05:20)    Artificial Nutrition [ ]     REFERRALS:   [ ]Chaplaincy  [ ] Hospice  [ ]Child Life  [ ]Social Work  [ ]Case management [ ]Holistic Therapy [ ] Physical Therapy [ ] Dietary   Goals of Care Document: GISEL Guzman (07-10-20 @ 17:20)  Goals of Care Conversation:   Advance Directives:  · Caregiver:  information could not be obtained    Conversation Discussion:  · Conversation Details  PCU Care plan delineation      HPI:  65 yo F with  hypothyroidism, HTN, IDDM, COPD/CHRIS on home O2, RA (on Prednisone), SBO s/p ex lap with lysis of adhesions c/b evisceration  history of trach and peg in the past after a protracted intubation > 30 days (reversal) ,  recent hospitalization in 3/2020 with encephalopathy, significantly hypothyroid (, without myxedema coma), small (stable) right sided SDH, UTI, catatonic reaction to VPA (now resolved) and DKA and psychiatric issues, 5/2020 hospitalization for GIB, Emphysematous cystitis, endometritis, Sigmoid colitis, morbid obesity, functional quadriplegia, a/w AMS found to have ICH now intubated > 40 days with no purposeful movement    We discussed global aspects of the patient's care with a focus on review goals and values.  I discussed  different approaches to care: curative, restorative, or purely symptom focused.  The decision makers Heavenly and Norris were present and eager to discussion options pre and post extubation.  The family was interested in possible home hospice if she survives extubation.    I recommended a purely symptom focused approach including the following:  DNAR to allow for a natural death in the event of asystole or a fatal arrythmia  DNI once extubated  No routine surveillance with blood draws  No new fever work up (no imaging, no blood cultures, no care escalation, no new antibiotics)  No artificial nutrition once the feeding is stopped in the periextubation period  No gastrostomy  No hemodialysis  APAP to address fevers  A trial of antibiotics to only address symptoms (e.g. dysuria)  A trial of IV fluids to address symptoms (e.g. seizure/myoclonus to address opiate induced neurotoxicity)  Maintenance of seizure medications  Maintenance of tramadol and gabapentin for OA.    Regarding my recommendations, they agreed to all of them.  They agreed to a transfer to the PCU as soon as bed is available  They agreed to extubation on Wednesday  They want to maintain current care until the periextubation period but realize that some adjustments may need to occur in as part of the process of extubation (feeds discontinuation  to reduce aspiration risk)  Additionally we discussed the possibility of fluid overload which would mean deescalation of IVF  Visitation regulations discussed with the PCU team and family, consensus found  Discussed symptom management and the fact that medications do not hasten death.  Explained the non-residential component of the PCU and its role in symptom management.  I explained that we would need to ask IV opioids in the event that her tramadol is not helpful since OA was a major part of her symptom burden.  Post extubation prognosis of minutes to hours offered.  Explained that she may die prior to extubation.    MOLST placed in the chart with an incapacity form  Case discussed with the fellow on call    Electronic Signatures for Addendum Section:   Arsalan Guzman) (Signed Addendum 10-Jul-2020 17:45)    ACP >16 min  Arsalan Guzman) (Signed Addendum 10-Jul-2020 18:36)    Family also agreed to no telemetry or continuous pulse ox.    Electronic Signatures:  Arsalan Guzman)  (Signed 10-Jul-2020 17:52)  	Authored: Goals of Care Conversation      Last Updated: 10-Jul-2020 18:36 by Arsalan Guzman) GAP TEAM PALLIATIVE CARE UNIT PROGRESS NOTE:      [  ] Patient on hospice program.    INDICATION FOR PALLIATIVE CARE UNIT SERVICES: symptom management, plan for compassionate extubation on Wednesday    INTERVAL HPI/OVERNIGHT EVENTS: Overnight OGT feeds held due to risk for aspiration. Per RN tube feeds seen in mouth requiring suctioning, although no residuals measured. Antoine placed for increased comfort care. This morning chest XR significant for ET tube in right main stem bronchus. RT adjusted ET tube, repeat XR pending.    DNR on chart: Yes    Allergies    Depakote (Other)  Seroquel (Other (Severe))    Intolerances    MEDICATIONS  (STANDING):  amLODIPine   Tablet 10 milliGRAM(s) Oral daily  dextrose 5%. 1000 milliLiter(s) (50 mL/Hr) IV Continuous <Continuous>  dextrose 50% Injectable 25 Gram(s) IV Push once  doxazosin 4 milliGRAM(s) Oral at bedtime  enoxaparin Injectable 30 milliGRAM(s) SubCutaneous two times a day  gabapentin   Solution 300 milliGRAM(s) Oral every 8 hours  insulin lispro (HumaLOG) corrective regimen sliding scale   SubCutaneous every 6 hours  insulin NPH human recombinant 18 Unit(s) SubCutaneous <User Schedule>  insulin NPH human recombinant 8 Unit(s) SubCutaneous <User Schedule>  insulin NPH human recombinant 52 Unit(s) SubCutaneous <User Schedule>  insulin NPH human recombinant 12 Unit(s) SubCutaneous <User Schedule>  lacosamide Solution 200 milliGRAM(s) Oral two times a day  levETIRAcetam  Solution 1000 milliGRAM(s) Oral two times a day  levothyroxine 150 MICROGram(s) Oral daily  metoprolol tartrate 25 milliGRAM(s) Oral every 6 hours  modafinil 200 milliGRAM(s) Oral daily  nystatin Powder 1 Application(s) Topical two times a day  pantoprazole   Suspension 40 milliGRAM(s) Oral daily  phenytoin   Suspension 250 milliGRAM(s) Oral every 8 hours  simvastatin 20 milliGRAM(s) Oral at bedtime    MEDICATIONS  (PRN):  acetaminophen    Suspension .. 650 milliGRAM(s) Oral every 6 hours PRN Temp greater or equal to 38C (100.4F), Mild Pain (1 - 3)  glycopyrrolate Injectable 0.2 milliGRAM(s) IV Push three times a day PRN secretions  haloperidol    Injectable 0.5 milliGRAM(s) IV Push every 6 hours PRN Nausea and/or Vomiting  haloperidol    Injectable 0.5 milliGRAM(s) IV Push every 6 hours PRN Agitation  HYDROmorphone  Injectable 0.5 milliGRAM(s) IV Push every 2 hours PRN Severe Pain (7 - 10)  HYDROmorphone  Injectable 0.5 milliGRAM(s) IV Push every 2 hours PRN Respiratory Rate GREATER THAN 22 breaths per minute  LORazepam   Injectable 0.5 milliGRAM(s) IV Push every 6 hours PRN Anxiety/refractory dyspnea  sodium chloride 0.9% lock flush 10 milliLiter(s) IV Push every 1 hour PRN Pre/post blood products, medications, blood draw, and to maintain line patency  traMADol 50 milliGRAM(s) Oral every 4 hours PRN Moderate Pain (4 - 6)    ITEMS UNCHECKED ARE NOT PRESENT    PRESENT SYMPTOMS: [x]Unable to obtain due to poor mentation due to sedation on mechanical ventilation  Source if other than patient:  [ ]Family   [x]Team     Pain: [ ] yes [x ] no  QOL impact -   Location -                    Aggravating factors -  Quality -  Radiation -  Timing-  Severity (0-10 scale):  Minimal acceptable level (0-10 scale):     Dyspnea:                           [ ]Mild [ ]Moderate [ ]Severe  Anxiety:                             [ ]Mild [ ]Moderate [ ]Severe  Fatigue:                             [ ]Mild [ ]Moderate [ ]Severe  Nausea:                             [ ]Mild [ ]Moderate [ ]Severe  Loss of appetite:              [ ]Mild [ ]Moderate [ ]Severe  Constipation:                    [ ]Mild [ ]Moderate [ ]Severe    PAINAD Score: 0    [email or web address] (Ctrl +  left click to view)  		  Other Symptoms:  [ ]All other review of systems negative     Palliative Performance Status Version 2:     10    %         http://npcrc.org/files/news/palliative_performance_scale_ppsv2.pdf  PHYSICAL EXAM:  Vital Signs Last 24 Hrs  T(C): 37.8 (11 Jul 2020 08:03), Max: 38.2 (10 Jul 2020 18:45)  T(F): 100 (11 Jul 2020 08:03), Max: 100.8 (10 Jul 2020 18:45)  HR: 110 (11 Jul 2020 09:15) (108 - 119)  BP: 103/72 (11 Jul 2020 08:03) (103/72 - 144/69)  BP(mean): 78 (10 Jul 2020 15:00) (78 - 98)  RR: 18 (11 Jul 2020 08:03) (18 - 29)  SpO2: 98% (11 Jul 2020 09:15) (91% - 99%) I&O's Summary    10 Jul 2020 07:01  -  11 Jul 2020 07:00  --------------------------------------------------------  IN: 1020 mL / OUT: 1568 mL / NET: -548 mL    GENERAL:   [x] sedated [ ]Alert  [ ]Oriented x   [ ]Lethargic  [ ]Cachexia  [ ]Unarousable  [ ]Verbal  [ ]Non-Verbal  Behavioral:   [ ] Anxiety  [ ] Delirium [ ] Agitation [ ] Other  HEENT:  [ ]Normal   [ ]Dry mouth   [x]ET Tube/Trach  [ ]Oral lesions  PULMONARY:   [ ]Clear [ ]Tachypnea  [ ]Audible excessive secretions   [ ]Rhonchi        [ ]Right [ ]Left [ ]Bilateral  [ ]Crackles        [ ]Right [ ]Left [ ]Bilateral  [ ]Wheezing     [ ]Right [ ]Left [ ]Bilateral  [x ]Diminshed BS [ ]Right [ ]Left [x]Bilateral    CARDIOVASCULAR:    [ ]Regular [ ]Irregular [x ]Tachy  [ ]Ubaldo [ ]Murmur [ ]Other  GASTROINTESTINAL:  [x ]Soft  [ ]Distended   [ ]+BS  [ x]Non tender [ ]Tender  [ ]PEG [x ]OGT/ NGT   Last BM:   7/9    GENITOURINARY:  [ ]Normal [ ] Incontinent   [ ]Oliguria/Anuria   [x ]Antoine  MUSCULOSKELETAL:   [ ]Normal   [ ]Weakness  [x ]Bed/Wheelchair bound [ ]Edema  NEUROLOGIC:   unable to assess due to sedation  [ ]No focal deficits  [x ] Cognitive impairment  [ ] Dysphagia [ ]Dysarthria [ ] Paresis [ ]Other   SKIN:   [ ]Normal  [x ]Rash  underarms b/l; impaired skin integrity perineum, sacral/gluteal  [ ]Pressure ulcer(s)  [ ]y [ ]n  Present on admission      CRITICAL CARE:  [ ] Shock Present  [ ]Septic [ ]Cardiogenic [ ]Neurologic [ ]Hypovolemic  [ ]  Vasopressors [ ]  Inotropes   [x ] Respiratory failure present [x ] Mechanical Ventilation [ ] Non-invasive ventilatory support [ ] High-Flow  [ ] Acute  [x ] Chronic [ ] Hypoxic  [ ] Hypercarbic [ ] Other  [ ] Other organ failure     LABS: none            RADIOLOGY & ADDITIONAL STUDIES:    < from: Xray Chest 1 View- PORTABLE-Routine (07.11.20 @ 09:12) >    CLINICAL INDICATION: intubated    TECHNIQUE: Frontal radiograph of the chest was obtained.    COMPARISON: 7/10/2020.    FINDINGS:     Cardiac silhouette is enlarged. Left pleural effusion. Lungs otherwise clear. No pneumothorax. Enteric tube tip below diaphragm, side port at gastroesophageal junction. Endotracheal tube tip in right mainstem bronchus.    IMPRESSION:   Endotracheal tube tip in right mainstem bronchus.        PROTEIN CALORIE MALNUTRITION: [ ] mild [ ] moderate [ ] severe  [ ] underweight [ ] morbid obesity    https://www.andeal.org/vault/2440/web/files/ONC/Table_Clinical%20Characteristics%20to%20Document%20Malnutrition-White%20JV%20et%20al%406487.pdf    Height (cm): 162.6 (05-30-20 @ 00:49), 152.4 (05-29-20 @ 19:18), 154.9 (05-12-20 @ 21:48)  Weight (kg): 99.6 (05-30-20 @ 00:49), 136.1 (05-29-20 @ 19:18), 98.2 (05-12-20 @ 23:57)  BMI (kg/m2): 37.7 (05-30-20 @ 00:49), 51.5 (05-30-20 @ 00:49), 58.6 (05-29-20 @ 19:18)    [ ] PPSV2 < or = 30% [ ] significant weight loss [ ] poor nutritional intake [ ] anasarca Albumin, Serum: 3.5 g/dL (07-01-20 @ 05:20)    Artificial Nutrition [ ]     REFERRALS:   [ ]Chaplaincy  [ ] Hospice  [ ]Child Life  [ ]Social Work  [ ]Case management [ ]Holistic Therapy [ ] Physical Therapy [ ] Dietary   Goals of Care Document: GISEL Guzman (07-10-20 @ 17:20)  Goals of Care Conversation:   Advance Directives:  · Caregiver:  information could not be obtained    Conversation Discussion:  · Conversation Details  PCU Care plan delineation      HPI:  65 yo F with  hypothyroidism, HTN, IDDM, COPD/CHRIS on home O2, RA (on Prednisone), SBO s/p ex lap with lysis of adhesions c/b evisceration  history of trach and peg in the past after a protracted intubation > 30 days (reversal) ,  recent hospitalization in 3/2020 with encephalopathy, significantly hypothyroid (, without myxedema coma), small (stable) right sided SDH, UTI, catatonic reaction to VPA (now resolved) and DKA and psychiatric issues, 5/2020 hospitalization for GIB, Emphysematous cystitis, endometritis, Sigmoid colitis, morbid obesity, functional quadriplegia, a/w AMS found to have ICH now intubated > 40 days with no purposeful movement    We discussed global aspects of the patient's care with a focus on review goals and values.  I discussed  different approaches to care: curative, restorative, or purely symptom focused.  The decision makers Heavenly and Norris were present and eager to discussion options pre and post extubation.  The family was interested in possible home hospice if she survives extubation.    I recommended a purely symptom focused approach including the following:  DNAR to allow for a natural death in the event of asystole or a fatal arrythmia  DNI once extubated  No routine surveillance with blood draws  No new fever work up (no imaging, no blood cultures, no care escalation, no new antibiotics)  No artificial nutrition once the feeding is stopped in the periextubation period  No gastrostomy  No hemodialysis  APAP to address fevers  A trial of antibiotics to only address symptoms (e.g. dysuria)  A trial of IV fluids to address symptoms (e.g. seizure/myoclonus to address opiate induced neurotoxicity)  Maintenance of seizure medications  Maintenance of tramadol and gabapentin for OA.    Regarding my recommendations, they agreed to all of them.  They agreed to a transfer to the PCU as soon as bed is available  They agreed to extubation on Wednesday  They want to maintain current care until the periextubation period but realize that some adjustments may need to occur in as part of the process of extubation (feeds discontinuation  to reduce aspiration risk)  Additionally we discussed the possibility of fluid overload which would mean deescalation of IVF  Visitation regulations discussed with the PCU team and family, consensus found  Discussed symptom management and the fact that medications do not hasten death.  Explained the non-residential component of the PCU and its role in symptom management.  I explained that we would need to ask IV opioids in the event that her tramadol is not helpful since OA was a major part of her symptom burden.  Post extubation prognosis of minutes to hours offered.  Explained that she may die prior to extubation.    MOLST placed in the chart with an incapacity form  Case discussed with the fellow on call    Electronic Signatures for Addendum Section:   Arsalan Guzman) (Signed Addendum 10-Jul-2020 17:45)    ACP >16 min  Arsalan Guzman) (Signed Addendum 10-Jul-2020 18:36)    Family also agreed to no telemetry or continuous pulse ox.    Electronic Signatures:  Arsalan Guzman)  (Signed 10-Jul-2020 17:52)  	Authored: Goals of Care Conversation      Last Updated: 10-Jul-2020 18:36 by Arsalan Guzman)

## 2020-07-11 NOTE — PROGRESS NOTE ADULT - PROBLEM SELECTOR PLAN 3
Intubated  Mode: AC/ CMV (Assist Control/ Continuous Mandatory Ventilation)  RR (machine): 14  TV (machine): 450  FiO2: 30  PEEP: 5  ITime: 1  MAP: 10  PIP: 20  Palliative extubation this Wednesday Intubated  Mode: AC/ CMV (Assist Control/ Continuous Mandatory Ventilation)  RR (machine): 18  TV (machine): 450  FiO2: 30  PEEP: 5  ITime: 1  MAP: 10  PIP: 20  Palliative extubation this Wednesday Intubated  XR chest this AM shows ET tube in right mainstem bronchus. ET tube adjustment by RT. Repeat chest xray pending.  Plan is for compassionate extubation this Wednesday Intubated  XR chest this AM shows ET tube in right mainstem bronchus. ET tube adjustment by Respiratory therapy. Repeat chest xray pending.  Plan is for compassionate extubation this Wednesday

## 2020-07-11 NOTE — PROGRESS NOTE ADULT - PROBLEM SELECTOR PLAN 1
Predominant symptom: encephalopathy  Likely due to neurologic insult  Degree of control: well controlled hypoactive delirium  Relative to previous day: unchanged  Current treatment regimen: none  Recommendations: IV haldol 0.5mg q6H for hyperactive delirium  Adverse events noted: none

## 2020-07-12 LAB
GLUCOSE BLDC GLUCOMTR-MCNC: 196 MG/DL — HIGH (ref 70–99)
GLUCOSE BLDC GLUCOMTR-MCNC: 234 MG/DL — HIGH (ref 70–99)
GLUCOSE BLDC GLUCOMTR-MCNC: 236 MG/DL — HIGH (ref 70–99)
GLUCOSE BLDC GLUCOMTR-MCNC: 267 MG/DL — HIGH (ref 70–99)

## 2020-07-12 PROCEDURE — 99233 SBSQ HOSP IP/OBS HIGH 50: CPT | Mod: GC

## 2020-07-12 RX ORDER — CHLORHEXIDINE GLUCONATE 213 G/1000ML
15 SOLUTION TOPICAL EVERY 12 HOURS
Refills: 0 | Status: DISCONTINUED | OUTPATIENT
Start: 2020-07-12 | End: 2020-07-13

## 2020-07-12 RX ORDER — METOPROLOL TARTRATE 50 MG
5 TABLET ORAL EVERY 6 HOURS
Refills: 0 | Status: DISCONTINUED | OUTPATIENT
Start: 2020-07-12 | End: 2020-07-13

## 2020-07-12 RX ADMIN — GABAPENTIN 300 MILLIGRAM(S): 400 CAPSULE ORAL at 14:18

## 2020-07-12 RX ADMIN — Medication 250 MILLIGRAM(S): at 14:18

## 2020-07-12 RX ADMIN — MODAFINIL 200 MILLIGRAM(S): 200 TABLET ORAL at 11:37

## 2020-07-12 RX ADMIN — CHLORHEXIDINE GLUCONATE 15 MILLILITER(S): 213 SOLUTION TOPICAL at 18:11

## 2020-07-12 RX ADMIN — Medication 5 MILLIGRAM(S): at 11:37

## 2020-07-12 RX ADMIN — Medication 250 MILLIGRAM(S): at 21:26

## 2020-07-12 RX ADMIN — ENOXAPARIN SODIUM 30 MILLIGRAM(S): 100 INJECTION SUBCUTANEOUS at 18:37

## 2020-07-12 RX ADMIN — Medication 4 MILLIGRAM(S): at 23:33

## 2020-07-12 RX ADMIN — Medication 250 MILLIGRAM(S): at 05:59

## 2020-07-12 RX ADMIN — GABAPENTIN 300 MILLIGRAM(S): 400 CAPSULE ORAL at 05:59

## 2020-07-12 RX ADMIN — ENOXAPARIN SODIUM 30 MILLIGRAM(S): 100 INJECTION SUBCUTANEOUS at 05:58

## 2020-07-12 RX ADMIN — Medication 1 MILLIGRAM(S): at 19:47

## 2020-07-12 RX ADMIN — Medication 1 MILLIGRAM(S): at 19:16

## 2020-07-12 RX ADMIN — SIMVASTATIN 20 MILLIGRAM(S): 20 TABLET, FILM COATED ORAL at 21:25

## 2020-07-12 RX ADMIN — GABAPENTIN 300 MILLIGRAM(S): 400 CAPSULE ORAL at 21:25

## 2020-07-12 NOTE — PROGRESS NOTE ADULT - SUBJECTIVE AND OBJECTIVE BOX
GAP TEAM PALLIATIVE CARE UNIT PROGRESS NOTE:      [  ] Patient on hospice program.    INDICATION FOR PALLIATIVE CARE UNIT SERVICES: symptom management, plan for compassionate extubation on Wednesday    INTERVAL HPI/OVERNIGHT EVENTS: Overnight OGT feeds held due to risk for aspiration. Per RN tube feeds seen in mouth requiring suctioning, although no residuals measured. Antoine placed for increased comfort care. This morning chest XR significant for ET tube in right main stem bronchus. RT adjusted ET tube, repeat XR pending.    DNR on chart: Yes    Allergies    Depakote (Other)  Seroquel (Other (Severe))    Intolerances    MEDICATIONS  (STANDING):  amLODIPine   Tablet 10 milliGRAM(s) Oral daily  dextrose 5%. 1000 milliLiter(s) (50 mL/Hr) IV Continuous <Continuous>  dextrose 50% Injectable 25 Gram(s) IV Push once  doxazosin 4 milliGRAM(s) Oral at bedtime  enoxaparin Injectable 30 milliGRAM(s) SubCutaneous two times a day  gabapentin   Solution 300 milliGRAM(s) Oral every 8 hours  insulin lispro (HumaLOG) corrective regimen sliding scale   SubCutaneous every 6 hours  insulin NPH human recombinant 18 Unit(s) SubCutaneous <User Schedule>  insulin NPH human recombinant 8 Unit(s) SubCutaneous <User Schedule>  insulin NPH human recombinant 52 Unit(s) SubCutaneous <User Schedule>  insulin NPH human recombinant 12 Unit(s) SubCutaneous <User Schedule>  lacosamide Solution 200 milliGRAM(s) Oral two times a day  levETIRAcetam  Solution 1000 milliGRAM(s) Oral two times a day  levothyroxine 150 MICROGram(s) Oral daily  metoprolol tartrate 25 milliGRAM(s) Oral every 6 hours  modafinil 200 milliGRAM(s) Oral daily  nystatin Powder 1 Application(s) Topical two times a day  pantoprazole   Suspension 40 milliGRAM(s) Oral daily  phenytoin   Suspension 250 milliGRAM(s) Oral every 8 hours  simvastatin 20 milliGRAM(s) Oral at bedtime    MEDICATIONS  (PRN):  acetaminophen    Suspension .. 650 milliGRAM(s) Oral every 6 hours PRN Temp greater or equal to 38C (100.4F), Mild Pain (1 - 3)  glycopyrrolate Injectable 0.2 milliGRAM(s) IV Push three times a day PRN secretions  haloperidol    Injectable 0.5 milliGRAM(s) IV Push every 6 hours PRN Nausea and/or Vomiting  haloperidol    Injectable 0.5 milliGRAM(s) IV Push every 6 hours PRN Agitation  HYDROmorphone  Injectable 0.5 milliGRAM(s) IV Push every 2 hours PRN Severe Pain (7 - 10)  HYDROmorphone  Injectable 0.5 milliGRAM(s) IV Push every 2 hours PRN Respiratory Rate GREATER THAN 22 breaths per minute  LORazepam   Injectable 0.5 milliGRAM(s) IV Push every 6 hours PRN Anxiety/refractory dyspnea  sodium chloride 0.9% lock flush 10 milliLiter(s) IV Push every 1 hour PRN Pre/post blood products, medications, blood draw, and to maintain line patency  traMADol 50 milliGRAM(s) Oral every 4 hours PRN Moderate Pain (4 - 6)    ITEMS UNCHECKED ARE NOT PRESENT    PRESENT SYMPTOMS: [x]Unable to obtain due to poor mentation due to sedation on mechanical ventilation  Source if other than patient:  [ ]Family   [x]Team     Pain: [ ] yes [x ] no  QOL impact -   Location -                    Aggravating factors -  Quality -  Radiation -  Timing-  Severity (0-10 scale):  Minimal acceptable level (0-10 scale):     Dyspnea:                           [ ]Mild [ ]Moderate [ ]Severe  Anxiety:                             [ ]Mild [ ]Moderate [ ]Severe  Fatigue:                             [ ]Mild [ ]Moderate [ ]Severe  Nausea:                             [ ]Mild [ ]Moderate [ ]Severe  Loss of appetite:              [ ]Mild [ ]Moderate [ ]Severe  Constipation:                    [ ]Mild [ ]Moderate [ ]Severe    PAINAD Score: 0    [email or web address] (Ctrl +  left click to view)  		  Other Symptoms:  [ ]All other review of systems negative     Palliative Performance Status Version 2:     10    %         http://npcrc.org/files/news/palliative_performance_scale_[email or web address].pdf  PHYSICAL EXAM:  Vital Signs Last 24 Hrs  T(C): 37.8 (11 Jul 2020 08:03), Max: 38.2 (10 Jul 2020 18:45)  T(F): 100 (11 Jul 2020 08:03), Max: 100.8 (10 Jul 2020 18:45)  HR: 110 (11 Jul 2020 09:15) (108 - 119)  BP: 103/72 (11 Jul 2020 08:03) (103/72 - 144/69)  BP(mean): 78 (10 Jul 2020 15:00) (78 - 98)  RR: 18 (11 Jul 2020 08:03) (18 - 29)  SpO2: 98% (11 Jul 2020 09:15) (91% - 99%) I&O's Summary    10 Jul 2020 07:01  -  11 Jul 2020 07:00  --------------------------------------------------------  IN: 1020 mL / OUT: 1568 mL / NET: -548 mL    GENERAL:   [x] sedated [ ]Alert  [ ]Oriented x   [ ]Lethargic  [ ]Cachexia  [ ]Unarousable  [ ]Verbal  [ ]Non-Verbal  Behavioral:   [ ] Anxiety  [ ] Delirium [ ] Agitation [ ] Other  HEENT:  [ ]Normal   [ ]Dry mouth   [x]ET Tube/Trach  [ ]Oral lesions  PULMONARY:   [ ]Clear [ ]Tachypnea  [ ]Audible excessive secretions   [ ]Rhonchi        [ ]Right [ ]Left [ ]Bilateral  [ ]Crackles        [ ]Right [ ]Left [ ]Bilateral  [ ]Wheezing     [ ]Right [ ]Left [ ]Bilateral  [x ]Diminshed BS [ ]Right [ ]Left [x]Bilateral    CARDIOVASCULAR:    [ ]Regular [ ]Irregular [x ]Tachy  [ ]Ubaldo [ ]Murmur [ ]Other  GASTROINTESTINAL:  [x ]Soft  [ ]Distended   [ ]+BS  [ x]Non tender [ ]Tender  [ ]PEG [x ]OGT/ NGT   Last BM:   7/9    GENITOURINARY:  [ ]Normal [ ] Incontinent   [ ]Oliguria/Anuria   [x ]Antoine  MUSCULOSKELETAL:   [ ]Normal   [ ]Weakness  [x ]Bed/Wheelchair bound [ ]Edema  NEUROLOGIC:   unable to assess due to sedation  [ ]No focal deficits  [x ] Cognitive impairment  [ ] Dysphagia [ ]Dysarthria [ ] Paresis [ ]Other   SKIN:   [ ]Normal  [x ]Rash  underarms b/l; impaired skin integrity perineum, sacral/gluteal  [ ]Pressure ulcer(s)  [ ]y [ ]n  Present on admission      CRITICAL CARE:  [ ] Shock Present  [ ]Septic [ ]Cardiogenic [ ]Neurologic [ ]Hypovolemic  [ ]  Vasopressors [ ]  Inotropes   [x ] Respiratory failure present [x ] Mechanical Ventilation [ ] Non-invasive ventilatory support [ ] High-Flow  [ ] Acute  [x ] Chronic [ ] Hypoxic  [ ] Hypercarbic [ ] Other  [ ] Other organ failure     LABS: none            RADIOLOGY & ADDITIONAL STUDIES:    < from: Xray Chest 1 View- PORTABLE-Routine (07.11.20 @ 09:12) >    CLINICAL INDICATION: intubated    TECHNIQUE: Frontal radiograph of the chest was obtained.    COMPARISON: 7/10/2020.    FINDINGS:     Cardiac silhouette is enlarged. Left pleural effusion. Lungs otherwise clear. No pneumothorax. Enteric tube tip below diaphragm, side port at gastroesophageal junction. Endotracheal tube tip in right mainstem bronchus.    IMPRESSION:   Endotracheal tube tip in right mainstem bronchus.        PROTEIN CALORIE MALNUTRITION: [ ] mild [ ] moderate [ ] severe  [ ] underweight [ ] morbid obesity    https://www.andeal.org/vault/2440/web/files/ONC/Table_Clinical%20Characteristics%20to%20Document%20Malnutrition-White%20JV%20et%20al%963306.pdf    Height (cm): 162.6 (05-30-20 @ 00:49), 152.4 (05-29-20 @ 19:18), 154.9 (05-12-20 @ 21:48)  Weight (kg): 99.6 (05-30-20 @ 00:49), 136.1 (05-29-20 @ 19:18), 98.2 (05-12-20 @ 23:57)  BMI (kg/m2): 37.7 (05-30-20 @ 00:49), 51.5 (05-30-20 @ 00:49), 58.6 (05-29-20 @ 19:18)    [ ] PPSV2 < or = 30% [ ] significant weight loss [ ] poor nutritional intake [ ] anasarca Albumin, Serum: 3.5 g/dL (07-01-20 @ 05:20)    Artificial Nutrition [ ]     REFERRALS:   [ ]Chaplaincy  [ ] Hospice  [ ]Child Life  [ ]Social Work  [ ]Case management [ ]Holistic Therapy [ ] Physical Therapy [ ] Dietary   Goals of Care Document: GISEL Guzman (07-10-20 @ 17:20)  Goals of Care Conversation:   Advance Directives:  · Caregiver:  information could not be obtained    Conversation Discussion:  · Conversation Details  PCU Care plan delineation      HPI:  63 yo F with  hypothyroidism, HTN, IDDM, COPD/CHRIS on home O2, RA (on Prednisone), SBO s/p ex lap with lysis of adhesions c/b evisceration  history of trach and peg in the past after a protracted intubation > 30 days (reversal) ,  recent hospitalization in 3/2020 with encephalopathy, significantly hypothyroid (, without myxedema coma), small (stable) right sided SDH, UTI, catatonic reaction to VPA (now resolved) and DKA and psychiatric issues, 5/2020 hospitalization for GIB, Emphysematous cystitis, endometritis, Sigmoid colitis, morbid obesity, functional quadriplegia, a/w AMS found to have ICH now intubated > 40 days with no purposeful movement    We discussed global aspects of the patient's care with a focus on review goals and values.  I discussed  different approaches to care: curative, restorative, or purely symptom focused.  The decision makers Heavenly and Norris were present and eager to discussion options pre and post extubation.  The family was interested in possible home hospice if she survives extubation.    I recommended a purely symptom focused approach including the following:  DNAR to allow for a natural death in the event of asystole or a fatal arrythmia  DNI once extubated  No routine surveillance with blood draws  No new fever work up (no imaging, no blood cultures, no care escalation, no new antibiotics)  No artificial nutrition once the feeding is stopped in the periextubation period  No gastrostomy  No hemodialysis  APAP to address fevers  A trial of antibiotics to only address symptoms (e.g. dysuria)  A trial of IV fluids to address symptoms (e.g. seizure/myoclonus to address opiate induced neurotoxicity)  Maintenance of seizure medications  Maintenance of tramadol and gabapentin for OA.    Regarding my recommendations, they agreed to all of them.  They agreed to a transfer to the PCU as soon as bed is available  They agreed to extubation on Wednesday  They want to maintain current care until the periextubation period but realize that some adjustments may need to occur in as part of the process of extubation (feeds discontinuation  to reduce aspiration risk)  Additionally we discussed the possibility of fluid overload which would mean deescalation of IVF  Visitation regulations discussed with the PCU team and family, consensus found  Discussed symptom management and the fact that medications do not hasten death.  Explained the non-residential component of the PCU and its role in symptom management.  I explained that we would need to ask IV opioids in the event that her tramadol is not helpful since OA was a major part of her symptom burden.  Post extubation prognosis of minutes to hours offered.  Explained that she may die prior to extubation.    MOLST placed in the chart with an incapacity form  Case discussed with the fellow on call    Electronic Signatures for Addendum Section:   Arsalan Guzman) (Signed Addendum 10-Jul-2020 17:45)    ACP >16 min  Arsalan Guzman) (Signed Addendum 10-Jul-2020 18:36)    Family also agreed to no telemetry or continuous pulse ox.    Electronic Signatures:  Arsalan Guzman)  (Signed 10-Jul-2020 17:52)  	Authored: Goals of Care Conversation      Last Updated: 10-Jul-2020 18:36 by Arsalan Guzman) GAP TEAM PALLIATIVE CARE UNIT PROGRESS NOTE:      [  ] Patient on hospice program.    INDICATION FOR PALLIATIVE CARE UNIT SERVICES: symptom management, plan for compassionate extubation on Wednesday    INTERVAL HPI/OVERNIGHT EVENTS: No acute events overnight. Pt sedated/intubated.     DNR on chart: Yes    Allergies    Depakote (Other)  Seroquel (Other (Severe))    Intolerances    MEDICATIONS  (STANDING):  amLODIPine   Tablet 10 milliGRAM(s) Oral daily  dextrose 5%. 1000 milliLiter(s) (50 mL/Hr) IV Continuous <Continuous>  dextrose 50% Injectable 25 Gram(s) IV Push once  doxazosin 4 milliGRAM(s) Oral at bedtime  enoxaparin Injectable 30 milliGRAM(s) SubCutaneous two times a day  gabapentin   Solution 300 milliGRAM(s) Oral every 8 hours  insulin lispro (HumaLOG) corrective regimen sliding scale   SubCutaneous every 6 hours  insulin NPH human recombinant 18 Unit(s) SubCutaneous <User Schedule>  insulin NPH human recombinant 8 Unit(s) SubCutaneous <User Schedule>  insulin NPH human recombinant 52 Unit(s) SubCutaneous <User Schedule>  insulin NPH human recombinant 12 Unit(s) SubCutaneous <User Schedule>  lacosamide Solution 200 milliGRAM(s) Oral two times a day  levETIRAcetam  Solution 1000 milliGRAM(s) Oral two times a day  levothyroxine 150 MICROGram(s) Oral daily  metoprolol tartrate 25 milliGRAM(s) Oral every 6 hours  modafinil 200 milliGRAM(s) Oral daily  nystatin Powder 1 Application(s) Topical two times a day  pantoprazole   Suspension 40 milliGRAM(s) Oral daily  phenytoin   Suspension 250 milliGRAM(s) Oral every 8 hours  simvastatin 20 milliGRAM(s) Oral at bedtime    MEDICATIONS  (PRN):  acetaminophen    Suspension .. 650 milliGRAM(s) Oral every 6 hours PRN Temp greater or equal to 38C (100.4F), Mild Pain (1 - 3)  glycopyrrolate Injectable 0.2 milliGRAM(s) IV Push three times a day PRN secretions  haloperidol    Injectable 0.5 milliGRAM(s) IV Push every 6 hours PRN Nausea and/or Vomiting  haloperidol    Injectable 0.5 milliGRAM(s) IV Push every 6 hours PRN Agitation  HYDROmorphone  Injectable 0.5 milliGRAM(s) IV Push every 2 hours PRN Severe Pain (7 - 10)  HYDROmorphone  Injectable 0.5 milliGRAM(s) IV Push every 2 hours PRN Respiratory Rate GREATER THAN 22 breaths per minute  LORazepam   Injectable 0.5 milliGRAM(s) IV Push every 6 hours PRN Anxiety/refractory dyspnea  sodium chloride 0.9% lock flush 10 milliLiter(s) IV Push every 1 hour PRN Pre/post blood products, medications, blood draw, and to maintain line patency  traMADol 50 milliGRAM(s) Oral every 4 hours PRN Moderate Pain (4 - 6)    ITEMS UNCHECKED ARE NOT PRESENT    PRESENT SYMPTOMS: [x]Unable to obtain due to poor mentation due to sedation on mechanical ventilation  Source if other than patient:  [ ]Family   [x]Team     Pain: [ ] yes [x ] no  QOL impact -   Location -                    Aggravating factors -  Quality -  Radiation -  Timing-  Severity (0-10 scale):  Minimal acceptable level (0-10 scale):     Dyspnea:                           [ ]Mild [ ]Moderate [ ]Severe  Anxiety:                             [ ]Mild [ ]Moderate [ ]Severe  Fatigue:                             [ ]Mild [ ]Moderate [ ]Severe  Nausea:                             [ ]Mild [ ]Moderate [ ]Severe  Loss of appetite:              [ ]Mild [ ]Moderate [ ]Severe  Constipation:                    [ ]Mild [ ]Moderate [ ]Severe    PAINAD Score: 0    [email or web address] (Ctrl +  left click to view)  		  Other Symptoms:  [ ]All other review of systems negative     Palliative Performance Status Version 2:     10    %         [email or web address]/files/news/palliative_performance_scale_[email or web address].pdf  PHYSICAL EXAM:  Vital Signs Last 24 Hrs  T(C): 37.8 (11 Jul 2020 08:03), Max: 38.2 (10 Jul 2020 18:45)  T(F): 100 (11 Jul 2020 08:03), Max: 100.8 (10 Jul 2020 18:45)  HR: 110 (11 Jul 2020 09:15) (108 - 119)  BP: 103/72 (11 Jul 2020 08:03) (103/72 - 144/69)  BP(mean): 78 (10 Jul 2020 15:00) (78 - 98)  RR: 18 (11 Jul 2020 08:03) (18 - 29)  SpO2: 98% (11 Jul 2020 09:15) (91% - 99%) I&O's Summary    10 Jul 2020 07:01  -  11 Jul 2020 07:00  --------------------------------------------------------  IN: 1020 mL / OUT: 1568 mL / NET: -548 mL    GENERAL:   [x] sedated [ ]Alert  [ ]Oriented x   [ ]Lethargic  [ ]Cachexia  [ ]Unarousable  [ ]Verbal  [ ]Non-Verbal  Behavioral:   [ ] Anxiety  [ ] Delirium [ ] Agitation [ ] Other  HEENT:  [ ]Normal   [ ]Dry mouth   [x]ET Tube/Trach  [ ]Oral lesions  PULMONARY:   [ ]Clear [ ]Tachypnea  [ ]Audible excessive secretions   [ ]Rhonchi        [ ]Right [ ]Left [ ]Bilateral  [ ]Crackles        [ ]Right [ ]Left [ ]Bilateral  [ ]Wheezing     [ ]Right [ ]Left [ ]Bilateral  [x ]Diminshed BS [ ]Right [ ]Left [x]Bilateral    CARDIOVASCULAR:    [ ]Regular [ ]Irregular [x ]Tachy  [ ]Ubaldo [ ]Murmur [ ]Other  GASTROINTESTINAL:  [x ]Soft  [ ]Distended   [ ]+BS  [ x]Non tender [ ]Tender  [ ]PEG [x ]OGT/ NGT   Last BM:   7/9    GENITOURINARY:  [ ]Normal [ ] Incontinent   [ ]Oliguria/Anuria   [x ]Antoine  MUSCULOSKELETAL:   [ ]Normal   [ ]Weakness  [x ]Bed/Wheelchair bound [ ]Edema  NEUROLOGIC:   unable to assess due to sedation  [ ]No focal deficits  [x ] Cognitive impairment  [ ] Dysphagia [ ]Dysarthria [ ] Paresis [ ]Other   SKIN:   [ ]Normal  [x ]Rash  underarms b/l; impaired skin integrity perineum, sacral/gluteal  [ ]Pressure ulcer(s)  [ ]y [ ]n  Present on admission      CRITICAL CARE:  [ ] Shock Present  [ ]Septic [ ]Cardiogenic [ ]Neurologic [ ]Hypovolemic  [ ]  Vasopressors [ ]  Inotropes   [x ] Respiratory failure present [x ] Mechanical Ventilation [ ] Non-invasive ventilatory support [ ] High-Flow  [ ] Acute  [x ] Chronic [ ] Hypoxic  [ ] Hypercarbic [ ] Other  [ ] Other organ failure     LABS: none    RADIOLOGY & ADDITIONAL STUDIES:    < from: Xray Chest 1 View- PORTABLE-Urgent (07.11.20 @ 14:59) >  FINDINGS/  IMPRESSION:   NG tube tip below diaphragm - side port at gastroesophageal junction.  ET tube tip is 1.9cm above the corwin.  The cardiomediastinal silhouette is within normal limits.  The lungs are clear.  Trace left pleural effusion persists.  No pneumothorax.   No acute bony finding.    < end of copied text >      PROTEIN CALORIE MALNUTRITION: [ ] mild [ ] moderate [ ] severe  [ ] underweight [ ] morbid obesity    https://www.andeal.org/vault/2440/web/files/ONC/Table_Clinical%20Characteristics%20to%20Document%20Malnutrition-White%20JV%20et%20al%156833.pdf    Height (cm): 162.6 (05-30-20 @ 00:49), 152.4 (05-29-20 @ 19:18), 154.9 (05-12-20 @ 21:48)  Weight (kg): 99.6 (05-30-20 @ 00:49), 136.1 (05-29-20 @ 19:18), 98.2 (05-12-20 @ 23:57)  BMI (kg/m2): 37.7 (05-30-20 @ 00:49), 51.5 (05-30-20 @ 00:49), 58.6 (05-29-20 @ 19:18)    [ ] PPSV2 < or = 30% [ ] significant weight loss [ ] poor nutritional intake [ ] anasarca Albumin, Serum: 3.5 g/dL (07-01-20 @ 05:20)    Artificial Nutrition [ ]     REFERRALS:   [ ]Chaplaincy  [ ] Hospice  [ ]Child Life  [ ]Social Work  [ ]Case management [ ]Holistic Therapy [ ] Physical Therapy [ ] Dietary   Goals of Care Document: GISEL Guzman (07-10-20 @ 17:20)  Goals of Care Conversation:   Advance Directives:  · Caregiver:  information could not be obtained    Conversation Discussion:  · Conversation Details  PCU Care plan delineation      HPI:  65 yo F with  hypothyroidism, HTN, IDDM, COPD/CHRIS on home O2, RA (on Prednisone), SBO s/p ex lap with lysis of adhesions c/b evisceration  history of trach and peg in the past after a protracted intubation > 30 days (reversal) ,  recent hospitalization in 3/2020 with encephalopathy, significantly hypothyroid (, without myxedema coma), small (stable) right sided SDH, UTI, catatonic reaction to VPA (now resolved) and DKA and psychiatric issues, 5/2020 hospitalization for GIB, Emphysematous cystitis, endometritis, Sigmoid colitis, morbid obesity, functional quadriplegia, a/w AMS found to have ICH now intubated > 40 days with no purposeful movement    We discussed global aspects of the patient's care with a focus on review goals and values.  I discussed  different approaches to care: curative, restorative, or purely symptom focused.  The decision makers Heavenly and Norris were present and eager to discussion options pre and post extubation.  The family was interested in possible home hospice if she survives extubation.    I recommended a purely symptom focused approach including the following:  DNAR to allow for a natural death in the event of asystole or a fatal arrythmia  DNI once extubated  No routine surveillance with blood draws  No new fever work up (no imaging, no blood cultures, no care escalation, no new antibiotics)  No artificial nutrition once the feeding is stopped in the periextubation period  No gastrostomy  No hemodialysis  APAP to address fevers  A trial of antibiotics to only address symptoms (e.g. dysuria)  A trial of IV fluids to address symptoms (e.g. seizure/myoclonus to address opiate induced neurotoxicity)  Maintenance of seizure medications  Maintenance of tramadol and gabapentin for OA.    Regarding my recommendations, they agreed to all of them.  They agreed to a transfer to the PCU as soon as bed is available  They agreed to extubation on Wednesday  They want to maintain current care until the periextubation period but realize that some adjustments may need to occur in as part of the process of extubation (feeds discontinuation  to reduce aspiration risk)  Additionally we discussed the possibility of fluid overload which would mean deescalation of IVF  Visitation regulations discussed with the PCU team and family, consensus found  Discussed symptom management and the fact that medications do not hasten death.  Explained the non-residential component of the PCU and its role in symptom management.  I explained that we would need to ask IV opioids in the event that her tramadol is not helpful since OA was a major part of her symptom burden.  Post extubation prognosis of minutes to hours offered.  Explained that she may die prior to extubation.    RADHA placed in the chart with an incapacity form  Case discussed with the fellow on call    Electronic Signatures for Addendum Section:   Arsalan Guzman) (Signed Addendum 10-Jul-2020 17:45)    ACP >16 min  Arsalan Guzman) (Signed Addendum 10-Jul-2020 18:36)    Family also agreed to no telemetry or continuous pulse ox.    Electronic Signatures:  Arsalan Guzman)  (Signed 10-Jul-2020 17:52)  	Authored: Goals of Care Conversation      Last Updated: 10-Jul-2020 18:36 by Arsalan Guzman) GAP TEAM PALLIATIVE CARE UNIT PROGRESS NOTE:      [  ] Patient on hospice program.    INDICATION FOR PALLIATIVE CARE UNIT SERVICES: symptom management, plan for compassionate extubation on Wednesday    INTERVAL HPI/OVERNIGHT EVENTS: No acute events overnight. Pt sedated/intubated.     DNR on chart: Yes    Allergies    Depakote (Other)  Seroquel (Other (Severe))    Intolerances    MEDICATIONS  (STANDING):  amLODIPine   Tablet 10 milliGRAM(s) Oral daily  dextrose 5%. 1000 milliLiter(s) (50 mL/Hr) IV Continuous <Continuous>  dextrose 50% Injectable 25 Gram(s) IV Push once  doxazosin 4 milliGRAM(s) Oral at bedtime  enoxaparin Injectable 30 milliGRAM(s) SubCutaneous two times a day  gabapentin   Solution 300 milliGRAM(s) Oral every 8 hours  insulin lispro (HumaLOG) corrective regimen sliding scale   SubCutaneous every 6 hours  insulin NPH human recombinant 18 Unit(s) SubCutaneous <User Schedule>  insulin NPH human recombinant 8 Unit(s) SubCutaneous <User Schedule>  insulin NPH human recombinant 52 Unit(s) SubCutaneous <User Schedule>  insulin NPH human recombinant 12 Unit(s) SubCutaneous <User Schedule>  lacosamide Solution 200 milliGRAM(s) Oral two times a day  levETIRAcetam  Solution 1000 milliGRAM(s) Oral two times a day  levothyroxine 150 MICROGram(s) Oral daily  metoprolol tartrate 25 milliGRAM(s) Oral every 6 hours  modafinil 200 milliGRAM(s) Oral daily  nystatin Powder 1 Application(s) Topical two times a day  pantoprazole   Suspension 40 milliGRAM(s) Oral daily  phenytoin   Suspension 250 milliGRAM(s) Oral every 8 hours  simvastatin 20 milliGRAM(s) Oral at bedtime    MEDICATIONS  (PRN):  acetaminophen    Suspension .. 650 milliGRAM(s) Oral every 6 hours PRN Temp greater or equal to 38C (100.4F), Mild Pain (1 - 3)  glycopyrrolate Injectable 0.2 milliGRAM(s) IV Push three times a day PRN secretions  haloperidol    Injectable 0.5 milliGRAM(s) IV Push every 6 hours PRN Nausea and/or Vomiting  haloperidol    Injectable 0.5 milliGRAM(s) IV Push every 6 hours PRN Agitation  HYDROmorphone  Injectable 0.5 milliGRAM(s) IV Push every 2 hours PRN Severe Pain (7 - 10)  HYDROmorphone  Injectable 0.5 milliGRAM(s) IV Push every 2 hours PRN Respiratory Rate GREATER THAN 22 breaths per minute  LORazepam   Injectable 0.5 milliGRAM(s) IV Push every 6 hours PRN Anxiety/refractory dyspnea  sodium chloride 0.9% lock flush 10 milliLiter(s) IV Push every 1 hour PRN Pre/post blood products, medications, blood draw, and to maintain line patency  traMADol 50 milliGRAM(s) Oral every 4 hours PRN Moderate Pain (4 - 6)    ITEMS UNCHECKED ARE NOT PRESENT    PRESENT SYMPTOMS: [x]Unable to obtain due to poor mentation due to sedation on mechanical ventilation  Source if other than patient:  [ ]Family   [x]Team     Pain: [ ] yes [x ] no  QOL impact -   Location -                    Aggravating factors -  Quality -  Radiation -  Timing-  Severity (0-10 scale):  Minimal acceptable level (0-10 scale):     Dyspnea:                           [ ]Mild [ ]Moderate [ ]Severe  Anxiety:                             [ ]Mild [ ]Moderate [ ]Severe  Fatigue:                             [ ]Mild [ ]Moderate [ ]Severe  Nausea:                             [ ]Mild [ ]Moderate [ ]Severe  Loss of appetite:              [ ]Mild [ ]Moderate [ ]Severe  Constipation:                    [ ]Mild [ ]Moderate [ ]Severe    PAINAD Score: 0    [email or web address].[email or web address]/[email or web address] (Ctrl +  left click to view)  		  Other Symptoms:  [ ]All other review of systems negative     Palliative Performance Status Version 2:     10    %         [email or web address]_[email or web address]  PHYSICAL EXAM:    Vital Signs Last 24 Hrs  T(C): 37.3 (12 Jul 2020 07:59), Max: 37.3 (12 Jul 2020 07:59)  T(F): 99.1 (12 Jul 2020 07:59), Max: 99.1 (12 Jul 2020 07:59)  HR: 107 (12 Jul 2020 12:39) (107 - 121)  BP: 109/73 (12 Jul 2020 07:59) (103/72 - 109/73)  BP(mean): --  RR: --  SpO2: 97% (12 Jul 2020 12:39) (93% - 99%)    GENERAL:   [x] sedated [ ]Alert  [ ]Oriented x   [ ]Lethargic  [ ]Cachexia  [ ]Unarousable  [ ]Verbal  [ ]Non-Verbal  Behavioral:   [ ] Anxiety  [ ] Delirium [ ] Agitation [ ] Other  HEENT:  [ ]Normal   [ ]Dry mouth   [x]ET Tube/Trach  [ ]Oral lesions  PULMONARY:   [ ]Clear [ ]Tachypnea  [ ]Audible excessive secretions   [ ]Rhonchi        [ ]Right [ ]Left [ ]Bilateral  [ ]Crackles        [ ]Right [ ]Left [ ]Bilateral  [ ]Wheezing     [ ]Right [ ]Left [ ]Bilateral  [x ]Diminshed BS [ ]Right [ ]Left [x]Bilateral    CARDIOVASCULAR:    [ ]Regular [ ]Irregular [x ]Tachy  [ ]Ubaldo [ ]Murmur [ ]Other  GASTROINTESTINAL:  [x ]Soft  [ ]Distended   [ ]+BS  [ x]Non tender [ ]Tender  [ ]PEG [x ]OGT/ NGT   Last BM:   7/9    GENITOURINARY:  [ ]Normal [ ] Incontinent   [ ]Oliguria/Anuria   [x ]Antoine  MUSCULOSKELETAL:   [ ]Normal   [ ]Weakness  [x ]Bed/Wheelchair bound [ ]Edema  NEUROLOGIC:   unable to assess due to sedation  [ ]No focal deficits  [x ] Cognitive impairment  [ ] Dysphagia [ ]Dysarthria [ ] Paresis [ ]Other   SKIN:   [ ]Normal  [x ]Rash  underarms b/l; impaired skin integrity perineum, sacral/gluteal  [ ]Pressure ulcer(s)  [ ]y [ ]n  Present on admission      CRITICAL CARE:  [ ] Shock Present  [ ]Septic [ ]Cardiogenic [ ]Neurologic [ ]Hypovolemic  [ ]  Vasopressors [ ]  Inotropes   [x ] Respiratory failure present [x ] Mechanical Ventilation [ ] Non-invasive ventilatory support [ ] High-Flow  [ ] Acute  [x ] Chronic [ ] Hypoxic  [ ] Hypercarbic [ ] Other  [ ] Other organ failure     LABS: none    RADIOLOGY & ADDITIONAL STUDIES:    < from: Xray Chest 1 View- PORTABLE-Urgent (07.11.20 @ 14:59) >  FINDINGS/  IMPRESSION:   NG tube tip below diaphragm - side port at gastroesophageal junction.  ET tube tip is 1.9cm above the corwin.  The cardiomediastinal silhouette is within normal limits.  The lungs are clear.  Trace left pleural effusion persists.  No pneumothorax.   No acute bony finding.    < end of copied text >      PROTEIN CALORIE MALNUTRITION: [ ] mild [ ] moderate [ ] severe  [ ] underweight [ ] morbid obesity    https://www.andeal.org/vault/2440/web/files/ONC/Table_Clinical%20Characteristics%20to%20Document%20Malnutrition-White%20JV%20et%20al%134098.pdf    Height (cm): 162.6 (05-30-20 @ 00:49), 152.4 (05-29-20 @ 19:18), 154.9 (05-12-20 @ 21:48)  Weight (kg): 99.6 (05-30-20 @ 00:49), 136.1 (05-29-20 @ 19:18), 98.2 (05-12-20 @ 23:57)  BMI (kg/m2): 37.7 (05-30-20 @ 00:49), 51.5 (05-30-20 @ 00:49), 58.6 (05-29-20 @ 19:18)    [ ] PPSV2 < or = 30% [ ] significant weight loss [ ] poor nutritional intake [ ] anasarca Albumin, Serum: 3.5 g/dL (07-01-20 @ 05:20)    Artificial Nutrition [ ]     REFERRALS:   [ ]Chaplaincy  [ ] Hospice  [ ]Child Life  [ ]Social Work  [ ]Case management [ ]Holistic Therapy [ ] Physical Therapy [ ] Dietary   Goals of Care Document: GISEL Guzman (07-10-20 @ 17:20)  Goals of Care Conversation:   Advance Directives:  · Caregiver:  information could not be obtained    Conversation Discussion:  · Conversation Details  PCU Care plan delineation      HPI:  65 yo F with  hypothyroidism, HTN, IDDM, COPD/CHRIS on home O2, RA (on Prednisone), SBO s/p ex lap with lysis of adhesions c/b evisceration  history of trach and peg in the past after a protracted intubation > 30 days (reversal) ,  recent hospitalization in 3/2020 with encephalopathy, significantly hypothyroid (, without myxedema coma), small (stable) right sided SDH, UTI, catatonic reaction to VPA (now resolved) and DKA and psychiatric issues, 5/2020 hospitalization for GIB, Emphysematous cystitis, endometritis, Sigmoid colitis, morbid obesity, functional quadriplegia, a/w AMS found to have ICH now intubated > 40 days with no purposeful movement    We discussed global aspects of the patient's care with a focus on review goals and values.  I discussed  different approaches to care: curative, restorative, or purely symptom focused.  The decision makers Burdick and Norris were present and eager to discussion options pre and post extubation.  The family was interested in possible home hospice if she survives extubation.    I recommended a purely symptom focused approach including the following:  DNAR to allow for a natural death in the event of asystole or a fatal arrythmia  DNI once extubated  No routine surveillance with blood draws  No new fever work up (no imaging, no blood cultures, no care escalation, no new antibiotics)  No artificial nutrition once the feeding is stopped in the periextubation period  No gastrostomy  No hemodialysis  APAP to address fevers  A trial of antibiotics to only address symptoms (e.g. dysuria)  A trial of IV fluids to address symptoms (e.g. seizure/myoclonus to address opiate induced neurotoxicity)  Maintenance of seizure medications  Maintenance of tramadol and gabapentin for OA.    Regarding my recommendations, they agreed to all of them.  They agreed to a transfer to the PCU as soon as bed is available  They agreed to extubation on Wednesday  They want to maintain current care until the periextubation period but realize that some adjustments may need to occur in as part of the process of extubation (feeds discontinuation  to reduce aspiration risk)  Additionally we discussed the possibility of fluid overload which would mean deescalation of IVF  Visitation regulations discussed with the PCU team and family, consensus found  Discussed symptom management and the fact that medications do not hasten death.  Explained the non-residential component of the PCU and its role in symptom management.  I explained that we would need to ask IV opioids in the event that her tramadol is not helpful since OA was a major part of her symptom burden.  Post extubation prognosis of minutes to hours offered.  Explained that she may die prior to extubation.    RADHA placed in the chart with an incapacity form  Case discussed with the fellow on call    Electronic Signatures for Addendum Section:   Arsalan Guzman) (Signed Addendum 10-Jul-2020 17:45)    ACP >16 min  Arsalan Guzman) (Signed Addendum 10-Jul-2020 18:36)    Family also agreed to no telemetry or continuous pulse ox.    Electronic Signatures:  Arsalan Guzman)  (Signed 10-Jul-2020 17:52)  	Authored: Goals of Care Conversation      Last Updated: 10-Jul-2020 18:36 by Arsalan Guzman)

## 2020-07-12 NOTE — PROGRESS NOTE ADULT - PROBLEM SELECTOR PLAN 2
Condition: ICH  Degree: severe  Active treatment: No intervention  Clinical impact on complexity: significant On Keppra, Vimpat.   Will add Ativan 1 mg q 1 PRN for breakthrough seizures.

## 2020-07-12 NOTE — PROGRESS NOTE ADULT - PROBLEM SELECTOR PLAN 4
PPSv2 prior to admission: 30  Current functional PPSv2: 10-20  Nursing care required: Total assistance with all ADLs  Code status documented: DNR  A review of the paper chart has been conducted  HCP form present:               Yes and valid [x]               Yes but invalid []                No []   MOLST present:                   Yes and valid [x]               Yes but invalid []                No []  Incapacity form present:   Yes and valid [x]                Yes but invalid []                No []                 N/A [] Intubated  On 7/11/2020, CXR showed ET tube in right mainstem bronchus. ET tube adjustment by Respiratory therapy was done. Repeat chest xray showed ET tube w/ appropriate placement.  Plan is for compassionate extubation on Wednesday

## 2020-07-12 NOTE — PROGRESS NOTE ADULT - PROBLEM SELECTOR PLAN 3
Intubated  XR chest this AM shows ET tube in right mainstem bronchus. ET tube adjustment by Respiratory therapy. Repeat chest xray pending.  Plan is for compassionate extubation this Wednesday Intubated  XR chest this AM shows ET tube in right mainstem bronchus. ET tube adjustment by Respiratory therapy. Repeat chest xray shows ET tube w/ appropriate placement.  Plan is for compassionate extubation this Wednesday Condition: ICH  Degree: severe  Active treatment: No intervention  Clinical impact on complexity: significant

## 2020-07-12 NOTE — PROGRESS NOTE ADULT - ASSESSMENT
65 Y/O multiple medical issues as described above admitted with altered mental status  CTH with left frontal ICH/SAH/IVH. Palliative care consulted for goals of care. Pt transferred to PCU yesterday for symptom managment, compassionate extubation planned for Wednesday.

## 2020-07-12 NOTE — PROGRESS NOTE ADULT - PROBLEM SELECTOR PLAN 5
Continue symptom management. OGT feeds stopped due to high risk for aspiration. Discussed with HCP, Heavenly, who is agreeable. Plan for compassionate extubation this Wednesday. See goals of care note for details. PPSv2 prior to admission: 30  Current functional PPSv2: 10-20  Nursing care required: Total assistance with all ADLs  Code status documented: DNR  A review of the paper chart has been conducted  HCP form present:               Yes and valid [x]               Yes but invalid []                No []   MOLST present:                   Yes and valid [x]               Yes but invalid []                No []  Incapacity form present:   Yes and valid [x]                Yes but invalid []                No []                 N/A []

## 2020-07-12 NOTE — CHART NOTE - NSCHARTNOTEFT_GEN_A_CORE
Transferred to PCU. Now NPO on correction scale only. Plans to extubate w/comfort care this week. Discussed w/team. Will sign off at this time. Please contact endocrine team if any further assistance required in the care of this patient.     thank you  pager: [phone]   cell: [phone]  office: [phone]

## 2020-07-12 NOTE — PROGRESS NOTE ADULT - ATTENDING COMMENTS
During the evening we were called by patient's RN for seizure's like activity (described as "generalized tremors like episode") which was persistent after Ativan 0.5 mg IV were given. Hypotension was also noticed.     At the time this episode happened, the patient's decision maker, Heavenly, was present. On the phone, I d/w Heavenly about the current situation. I indicated that the recommended treatment was Ativan 2 mg IV x 1 dose; however, that understanding the patient's frail state and hypotension, it was possible that hypotension was going to be more pronounced. Alteratively, I indicated we were able to try a lower dose of Ativan (1mg) though probably less effective. Heavenly opted for a more conservative approach with a lower dose of Ativan.    Marcus Plummer MD.

## 2020-07-12 NOTE — CHART NOTE - NSCHARTNOTEFT_GEN_A_CORE
Called by RN due to concern for seizure like activity/tremulous. Pt also hypotensive. Discussed with HCP, Heavenly, regarding possible adverse affect of ativan worsening hypotension. HCP would like to be conservative with medication. Will increase ativan to 1mg q1h and give additional 1mg in 30 minutes if no improvement. At that time will increase to 2mg q1h if seizure like activity persists.

## 2020-07-12 NOTE — PROGRESS NOTE ADULT - PROBLEM SELECTOR PLAN 6
Continue symptom management. OGT feeds stopped due to high risk for aspiration. Discussed with HCP, Heavenly, who is agreeable. Plan for compassionate extubation this Wednesday. See goals of care note for details a above.

## 2020-07-13 DIAGNOSIS — R06.00 DYSPNEA, UNSPECIFIED: ICD-10-CM

## 2020-07-13 DIAGNOSIS — E03.9 HYPOTHYROIDISM, UNSPECIFIED: ICD-10-CM

## 2020-07-13 DIAGNOSIS — G93.49 OTHER ENCEPHALOPATHY: ICD-10-CM

## 2020-07-13 DIAGNOSIS — R56.9 UNSPECIFIED CONVULSIONS: ICD-10-CM

## 2020-07-13 DIAGNOSIS — R52 PAIN, UNSPECIFIED: ICD-10-CM

## 2020-07-13 LAB
GLUCOSE BLDC GLUCOMTR-MCNC: 264 MG/DL — HIGH (ref 70–99)
GLUCOSE BLDC GLUCOMTR-MCNC: 287 MG/DL — HIGH (ref 70–99)
GLUCOSE BLDC GLUCOMTR-MCNC: 291 MG/DL — HIGH (ref 70–99)

## 2020-07-13 PROCEDURE — 99233 SBSQ HOSP IP/OBS HIGH 50: CPT | Mod: GC

## 2020-07-13 RX ORDER — HYDROMORPHONE HYDROCHLORIDE 2 MG/ML
1 INJECTION INTRAMUSCULAR; INTRAVENOUS; SUBCUTANEOUS
Refills: 0 | Status: DISCONTINUED | OUTPATIENT
Start: 2020-07-13 | End: 2020-07-15

## 2020-07-13 RX ORDER — LEVOTHYROXINE SODIUM 125 MCG
150 TABLET ORAL DAILY
Refills: 0 | Status: DISCONTINUED | OUTPATIENT
Start: 2020-07-13 | End: 2020-07-14

## 2020-07-13 RX ORDER — HYDROMORPHONE HYDROCHLORIDE 2 MG/ML
0.5 INJECTION INTRAMUSCULAR; INTRAVENOUS; SUBCUTANEOUS EVERY 4 HOURS
Refills: 0 | Status: DISCONTINUED | OUTPATIENT
Start: 2020-07-13 | End: 2020-07-15

## 2020-07-13 RX ORDER — MODAFINIL 200 MG/1
200 TABLET ORAL DAILY
Refills: 0 | Status: DISCONTINUED | OUTPATIENT
Start: 2020-07-13 | End: 2020-07-13

## 2020-07-13 RX ORDER — ACETAMINOPHEN 500 MG
650 TABLET ORAL EVERY 6 HOURS
Refills: 0 | Status: DISCONTINUED | OUTPATIENT
Start: 2020-07-13 | End: 2020-07-15

## 2020-07-13 RX ADMIN — GABAPENTIN 300 MILLIGRAM(S): 400 CAPSULE ORAL at 05:33

## 2020-07-13 RX ADMIN — Medication 250 MILLIGRAM(S): at 21:33

## 2020-07-13 RX ADMIN — Medication 1 MILLIGRAM(S): at 04:07

## 2020-07-13 RX ADMIN — HYDROMORPHONE HYDROCHLORIDE 0.5 MILLIGRAM(S): 2 INJECTION INTRAMUSCULAR; INTRAVENOUS; SUBCUTANEOUS at 18:03

## 2020-07-13 RX ADMIN — HYDROMORPHONE HYDROCHLORIDE 0.5 MILLIGRAM(S): 2 INJECTION INTRAMUSCULAR; INTRAVENOUS; SUBCUTANEOUS at 14:23

## 2020-07-13 RX ADMIN — Medication 250 MILLIGRAM(S): at 14:23

## 2020-07-13 RX ADMIN — ENOXAPARIN SODIUM 30 MILLIGRAM(S): 100 INJECTION SUBCUTANEOUS at 18:03

## 2020-07-13 RX ADMIN — Medication 650 MILLIGRAM(S): at 18:02

## 2020-07-13 RX ADMIN — MODAFINIL 200 MILLIGRAM(S): 200 TABLET ORAL at 12:27

## 2020-07-13 RX ADMIN — Medication 150 MICROGRAM(S): at 18:02

## 2020-07-13 RX ADMIN — ENOXAPARIN SODIUM 30 MILLIGRAM(S): 100 INJECTION SUBCUTANEOUS at 05:19

## 2020-07-13 RX ADMIN — Medication 1 MILLIGRAM(S): at 08:00

## 2020-07-13 RX ADMIN — CHLORHEXIDINE GLUCONATE 15 MILLILITER(S): 213 SOLUTION TOPICAL at 05:19

## 2020-07-13 RX ADMIN — Medication 250 MILLIGRAM(S): at 05:20

## 2020-07-13 RX ADMIN — HYDROMORPHONE HYDROCHLORIDE 0.5 MILLIGRAM(S): 2 INJECTION INTRAMUSCULAR; INTRAVENOUS; SUBCUTANEOUS at 21:33

## 2020-07-13 RX ADMIN — Medication 1 MILLIGRAM(S): at 00:11

## 2020-07-13 NOTE — PROGRESS NOTE ADULT - PROBLEM SELECTOR PLAN 5
Predominant symptom: encephalopathy  Most Likely due to neurologic insult from ICH/SAH/IVH  Degree of control: well controlled hypoactive delirium  Relative to previous day: unchanged  Current treatment regimen: none Intubated, on mechanical ventilation with vent settings at 450/30/5/14  On 7/11/2020, CXR showed ET tube in right mainstem bronchus. ET tube adjustment by Respiratory therapy was done. Repeat chest xray showed ET tube w/ appropriate placement.  Plan is for compassionate extubation on Wednesday 7/15

## 2020-07-13 NOTE — PROGRESS NOTE ADULT - PROBLEM SELECTOR PLAN 7
Condition: ICH  Degree: severe  Active treatment: No intervention  Clinical impact on complexity: significant C/W 150 mcg qd via OG Tube as per endocrine recs

## 2020-07-13 NOTE — PROGRESS NOTE ADULT - PROBLEM SELECTOR PLAN 3
2/2 to Hypoxic respiratory failure  Dilaudid 0.5 mg IVP q 4 ATC and Dilaudid 1 mg IVP q 1 hour PRN for Dyspnea 2/2 to Hypoxic respiratory failure  Dilaudid 0.5 mg IVP q 6 ATC and Dilaudid 1 mg IVP q 1 hour PRN for Dyspnea

## 2020-07-13 NOTE — PROGRESS NOTE ADULT - PROBLEM SELECTOR PLAN 4
PPSv2 prior to admission: 30  Current functional PPSv2: 10  Nursing care required: Total assistance with all ADLs  Code status documented: DNR  A review of the paper chart has been conducted  HCP form present:               Yes and valid [x]               Yes but invalid []                No []   MOLST present:                   Yes and valid [x]               Yes but invalid []                No []  Incapacity form present:   Yes and valid [x]                Yes but invalid []                No []                 N/A []

## 2020-07-13 NOTE — PROGRESS NOTE ADULT - PROBLEM SELECTOR PLAN 2
Most likely 2/2 to impaired skin integrity in the sacral/ Gluteal area, IVH  Dilaudid 0.5 mg IVP q 4 ATC and Dilaudid 1 mg IVP q 1 hour PRN for pain Most likely 2/2 to impaired skin integrity in the sacral/ Gluteal area, IVH and intubation/immobility  Dilaudid 0.5 mg IVP q 6 ATC and Dilaudid 1 mg IVP q 1 hour PRN for pain

## 2020-07-13 NOTE — PROGRESS NOTE ADULT - PROBLEM SELECTOR PLAN 8
Family at bedside today, discussed with family all adjustment of medications with ativan and Dilaudid. Family ( Son Wiliam)  wanted to know if patient might  even prior to the extubation to take place on 7/15 since the BP is low. It was explained to him that, yes there is a possibility that may  due to progression of dx.  wiliam expressed understanding. all questions answered.  emotional support provided. C/W 150 mcg qd via OG Tube as per endocrine reccs Most Likely due to neurologic insult from ICH/SAH/IVH

## 2020-07-13 NOTE — PROGRESS NOTE ADULT - PROBLEM SELECTOR PLAN 1
Seizure-like activity overnight, received Ativan 1 mg x 3   On Keppra, Vimpat and dilantin  Add Ativan 2 mg IVP q 30 minutes PRN stop after 3 doses. Seizure-like activity overnight, received Ativan 1 mg x 3   On Keppra, Vimpat and dilantin  Add Ativan 2 mg IVP q 15 minutes PRN  seizures  stop after 2 doses.

## 2020-07-13 NOTE — PROGRESS NOTE ADULT - PROBLEM SELECTOR PLAN 6
Intubated, on mechanical ventilation with vent settings at 450/30/5/14  On 7/11/2020, CXR showed ET tube in right mainstem bronchus. ET tube adjustment by Respiratory therapy was done. Repeat chest xray showed ET tube w/ appropriate placement.  Plan is for compassionate extubation on Wednesday 7/15 Condition: ICH  Degree: severe  Active treatment: No intervention  Clinical impact on complexity: significant

## 2020-07-13 NOTE — PROGRESS NOTE ADULT - ASSESSMENT
63 Y/O multiple medical issues as described above admitted with altered mental status  CTH with left frontal ICH/SAH/IVH. Palliative care consulted for goals of care. Pt transferred to PCU  for symptom management of left frontal ICH/SAH/IVH/ seizures/ Dyspnea.  Plan for compassionate extubation on Wednesday.

## 2020-07-13 NOTE — PROGRESS NOTE ADULT - SUBJECTIVE AND OBJECTIVE BOX
GAP TEAM PALLIATIVE CARE UNIT PROGRESS NOTE:      [  ] Patient on hospice program.    INDICATION FOR PALLIATIVE CARE UNIT SERVICES: Symptom management, in setting of left frontal ICH/SAH/IVH/ seizures/ Dyspnea.  Plan for compassionate extubation on Wednesday 7/15    INTERVAL HPI/OVERNIGHT EVENTS:  Patients remains intubated, Vent settings: 450/30/5/14. Patient  received Ativan 1mg x 3 for seizures and Ativan x 2 for anxiety/ agitation overnight, also received Dilaudid 0.5 mg x 3 overnight for Dyspnea.  Tachycardic at 114, Hypotensive 82/61.   Family at bedside ( Son Wiliam)     -Will increase Dilaudid to 1 mg ivp q 1 hour PRN for pain/ Dyspnea and Dilaudid 0.5 mg IVP q 4 ATC  -Will adjust Ativan to 2 mg IVP 30 minutes PRN STOP AFTER 3 DOSES,  and Ativan 1 mg ivp q 1 hour PRN for Anxiety/ Agitation         DNR on chart: Yes    Allergies    Depakote (Other)  Seroquel (Other (Severe))    Intolerances    MEDICATIONS  (STANDING):  dextrose 5%. 1000 milliLiter(s) (50 mL/Hr) IV Continuous <Continuous>  enoxaparin Injectable 30 milliGRAM(s) SubCutaneous two times a day  HYDROmorphone  Injectable 0.5 milliGRAM(s) IV Push every 4 hours  insulin lispro (HumaLOG) corrective regimen sliding scale   SubCutaneous every 6 hours  lacosamide IVPB 200 milliGRAM(s) IV Intermittent every 12 hours  levETIRAcetam  IVPB 1000 milliGRAM(s) IV Intermittent every 12 hours  levothyroxine Injectable 150 MICROGram(s) IV Push at bedtime  metoprolol tartrate Injectable 5 milliGRAM(s) IV Push every 6 hours  modafinil 200 milliGRAM(s) Oral daily  pantoprazole  Injectable 40 milliGRAM(s) IV Push daily  phenytoin   Suspension 250 milliGRAM(s) Oral every 8 hours  simvastatin 20 milliGRAM(s) Oral at bedtime  sodium chloride 0.9%. 1000 milliLiter(s) (10 mL/Hr) IV Continuous <Continuous>    MEDICATIONS  (PRN):  acetaminophen    Suspension .. 650 milliGRAM(s) Oral every 6 hours PRN Temp greater or equal to 38C (100.4F), Mild Pain (1 - 3)  bisacodyl Suppository 10 milliGRAM(s) Rectal daily PRN Constipation  HYDROmorphone  Injectable 1 milliGRAM(s) IV Push every 1 hour PRN dyspnea  HYDROmorphone  Injectable 1 milliGRAM(s) IV Push every 1 hour PRN pain  LORazepam   Injectable 2 milliGRAM(s) IV Push every 30 minutes PRN Seizures acitivity  LORazepam   Injectable 1 milliGRAM(s) IV Push every 1 hour PRN Anxiety/ Agitation    ITEMS UNCHECKED ARE NOT PRESENT    PRESENT SYMPTOMS: [x ]Unable to obtain due to poor mentation 2/2 intubated, on mechanical ventilation  Source if other than patient:  [ ]Family   [ ]Team     Pain: [ ] yes [ ] no  QOL impact -   Location -                    Aggravating factors -  Quality -  Radiation -  Timing-  Severity (0-10 scale):  Minimal acceptable level (0-10 scale):     Dyspnea:                           [ ]Mild [ ]Moderate [ ]Severe  Anxiety:                             [ ]Mild [ ]Moderate [ ]Severe  Fatigue:                             [ ]Mild [ ]Moderate [ ]Severe  Nausea:                             [ ]Mild [ ]Moderate [ ]Severe  Loss of appetite:              [ ]Mild [ ]Moderate [ ]Severe  Constipation:                    [ ]Mild [ ]Moderate [ ]Severe    PAINAD Score: 0-2 (within the past 24 hours)     [email or web address] (Ctrl +  left click to view)  		  Other Symptoms:  [ ]All other review of systems negative     Palliative Performance Status Version 2:         10%         http://npcrc.org/files/news/palliative_performance_scale_ppsv2.pdf  PHYSICAL EXAM:  Vital Signs Last 24 Hrs  T(C): 37.8 (13 Jul 2020 09:05), Max: 37.8 (13 Jul 2020 09:05)  T(F): 100 (13 Jul 2020 09:05), Max: 100 (13 Jul 2020 09:05)  HR: 110 (13 Jul 2020 09:39) (107 - 123)  BP: 85/58 (13 Jul 2020 09:05) (82/61 - 85/58)  BP(mean): --  RR: 22 (13 Jul 2020 09:05) (15 - 22)  SpO2: 91% (13 Jul 2020 09:39) (91% - 97%) I&O's Summary    12 Jul 2020 07:01  -  13 Jul 2020 07:00  --------------------------------------------------------  IN: 0 mL / OUT: 350 mL / NET: -350 mL    GENERAL:  [ ]Alert  [ ]Oriented x   [ ]Lethargic  [ ]Cachexia  [ ]Unarousable  [ ]Verbal  [ x]Non-Verbal  Behavioral:   [ ] Anxiety  [ ] Delirium [ ] Agitation [ ] Other  HEENT:  [ ]Normal   [ ]Dry mouth   [x ]ET Tube/Trach  [ ]Oral lesions  PULMONARY:   [ ]Clear [ ]Tachypnea  [ ]Audible excessive secretions   [ ]Rhonchi        [ ]Right [ ]Left [ ]Bilateral  [ ]Crackles        [ ]Right [ ]Left [ ]Bilateral  [ ]Wheezing     [ ]Right [ ]Left [ ]Bilateral  [x ]Diminshed BS [ ]Right [ ]Left [x ]Bilateral    CARDIOVASCULAR:    [ ]Regular [ ]Irregular [x ]Tachy  [ ]Ubaldo [ ]Murmur [ ]Other  GASTROINTESTINAL:  [ x]Soft  [ ]Distended   [ ]+BS  [x ]Non tender [ ]Tender  [ ]PEG [x ]OGT/ NGT   Last BM: 7/13      GENITOURINARY:  [ ]Normal [ ] Incontinent   [ ]Oliguria/Anuria   [ x]Antoine  MUSCULOSKELETAL:   [ ]Normal   [ ]Weakness  [x ]Bed/Wheelchair bound [ ]Edema  NEUROLOGIC:   [ ]No focal deficits  [x ] Cognitive impairment  [ ] Dysphagia [ ]Dysarthria [ ] Paresis [ ]Other   SKIN:   [ ]Normal   [x ]Rash  underarms b/l; impaired skin integrity perineum, sacral/gluteal   [ ]Pressure ulcer(s)  [ ]y [ ]n  Present on admission      CRITICAL CARE:  [ ] Shock Present  [ ]Septic [ ]Cardiogenic [ ]Neurologic [ ]Hypovolemic  [ ]  Vasopressors [ ]  Inotropes   [x ] Respiratory failure present [ x] Mechanical Ventilation [ ] Non-invasive ventilatory support [ ] High-Flow  [ ] Acute  [x ] Chronic [ ] Hypoxic  [ ] Hypercarbic [ ] Other  [ ] Other organ failure: Brain    LABS: None New      RADIOLOGY & ADDITIONAL STUDIES:    EXAM:  XR CHEST PORTABLE ROUTINE 1V                            *** ADDENDUM 07/11/2020  ***  PROCEDURE DATE:  07/11/2020        Cardiac silhouette is enlarged. Left pleural effusion. Lungs otherwise clear. No pneumothorax. Enteric tube tip below diaphragm, side port at gastroesophageal junction. Endotracheal tube tip in right mainstem bronchus.    IMPRESSION:   Endotracheal tube tip in right mainstem bronchus.              PROTEIN CALORIE MALNUTRITION: [ ] mild [ ] moderate [ ] severe  [ ] underweight [ ] morbid obesity    https://www.andeal.org/vault/2440/web/files/ONC/Table_Clinical%20Characteristics%20to%20Document%20Malnutrition-White%20JV%20et%20al%202012.pdf    Height (cm): 162.6 (05-30-20 @ 00:49), 152.4 (05-29-20 @ 19:18), 154.9 (05-12-20 @ 21:48)  Weight (kg): 99.6 (05-30-20 @ 00:49), 136.1 (05-29-20 @ 19:18), 98.2 (05-12-20 @ 23:57)  BMI (kg/m2): 37.7 (05-30-20 @ 00:49), 51.5 (05-30-20 @ 00:49), 58.6 (05-29-20 @ 19:18)    [ x] PPSV2 < or = 30% [ ] significant weight loss [ ] poor nutritional intake [ ] anasarca Albumin, Serum: 3.5 g/dL (07-01-20 @ 05:20)    Artificial Nutrition [ ]     REFERRALS:   [ ]Chaplaincy  [ ] Hospice  [ ]Child Life  [ ]Social Work  [ ]Case management [ ]Holistic Therapy [ ] Physical Therapy [ ] Dietary   Goals of Care Document: GISEL Guzman (07-10-20 @ 17:20)  Goals of Care Conversation:   Advance Directives:  · Caregiver:  information could not be obtained    Conversation Discussion:  · Conversation Details  PCU Care plan delineation      HPI:  63 yo F with  hypothyroidism, HTN, IDDM, COPD/CHRIS on home O2, RA (on Prednisone), SBO s/p ex lap with lysis of adhesions c/b evisceration  history of trach and peg in the past after a protracted intubation > 30 days (reversal) ,  recent hospitalization in 3/2020 with encephalopathy, significantly hypothyroid (, without myxedema coma), small (stable) right sided SDH, UTI, catatonic reaction to VPA (now resolved) and DKA and psychiatric issues, 5/2020 hospitalization for GIB, Emphysematous cystitis, endometritis, Sigmoid colitis, morbid obesity, functional quadriplegia, a/w AMS found to have ICH now intubated > 40 days with no purposeful movement    We discussed global aspects of the patient's care with a focus on review goals and values.  I discussed  different approaches to care: curative, restorative, or purely symptom focused.  The decision makers Heavenly and Norris were present and eager to discussion options pre and post extubation.  The family was interested in possible home hospice if she survives extubation.    I recommended a purely symptom focused approach including the following:  DNAR to allow for a natural death in the event of asystole or a fatal arrythmia  DNI once extubated  No routine surveillance with blood draws  No new fever work up (no imaging, no blood cultures, no care escalation, no new antibiotics)  No artificial nutrition once the feeding is stopped in the periextubation period  No gastrostomy  No hemodialysis  APAP to address fevers  A trial of antibiotics to only address symptoms (e.g. dysuria)  A trial of IV fluids to address symptoms (e.g. seizure/myoclonus to address opiate induced neurotoxicity)  Maintenance of seizure medications  Maintenance of tramadol and gabapentin for OA.    Regarding my recommendations, they agreed to all of them.  They agreed to a transfer to the PCU as soon as bed is available  They agreed to extubation on Wednesday  They want to maintain current care until the periextubation period but realize that some adjustments may need to occur in as part of the process of extubation (feeds discontinuation  to reduce aspiration risk)  Additionally we discussed the possibility of fluid overload which would mean deescalation of IVF  Visitation regulations discussed with the PCU team and family, consensus found  Discussed symptom management and the fact that medications do not hasten death.  Explained the non-residential component of the PCU and its role in symptom management.  I explained that we would need to ask IV opioids in the event that her tramadol is not helpful since OA was a major part of her symptom burden.  Post extubation prognosis of minutes to hours offered.  Explained that she may die prior to extubation.    MOLST placed in the chart with an incapacity form  Case discussed with the fellow on call    Electronic Signatures for Addendum Section:   Arsalan Guzman) (Signed Addendum 10-Jul-2020 17:45)    ACP >16 min  Arsalan Guzman) (Signed Addendum 10-Jul-2020 18:36)    Family also agreed to no telemetry or continuous pulse ox.    Electronic Signatures:  Arsalan Guzman)  (Signed 10-Jul-2020 17:52)  	Authored: Goals of Care Conversation      Last Updated: 10-Jul-2020 18:36 by Arsalan Guzman) GAP TEAM PALLIATIVE CARE UNIT PROGRESS NOTE:      [  ] Patient on hospice program.    INDICATION FOR PALLIATIVE CARE UNIT SERVICES: Symptom management, in setting of left frontal ICH/SAH/IVH/ seizures/ Dyspnea.  Plan for compassionate extubation on Wednesday 7/15    INTERVAL HPI/OVERNIGHT EVENTS:  Patients remains intubated, Vent settings: 450/30/5/14. Patient  received Ativan 1mg x 3 for seizures and Ativan x 2 for anxiety/ agitation overnight, also received Dilaudid 0.5 mg x 3 overnight for Dyspnea.  Tachycardic at 114, Hypotensive 82/61.   Family at bedside ( Son Wiliam)     -Will increase Dilaudid to 1 mg ivp q 1 hour PRN for pain/ Dyspnea and Dilaudid 0.5 mg IVP q 4 ATC  -Will adjust Ativan to 2 mg IVP 30 minutes PRN STOP AFTER 3 DOSES,  and Ativan 1 mg ivp q 1 hour PRN for Anxiety/ Agitation         DNR on chart: Yes    Allergies    Depakote (Other)  Seroquel (Other (Severe))    Intolerances    MEDICATIONS  (STANDING):  dextrose 5%. 1000 milliLiter(s) (50 mL/Hr) IV Continuous <Continuous>  enoxaparin Injectable 30 milliGRAM(s) SubCutaneous two times a day  HYDROmorphone  Injectable 0.5 milliGRAM(s) IV Push every 4 hours  insulin lispro (HumaLOG) corrective regimen sliding scale   SubCutaneous every 6 hours  lacosamide IVPB 200 milliGRAM(s) IV Intermittent every 12 hours  levETIRAcetam  IVPB 1000 milliGRAM(s) IV Intermittent every 12 hours  levothyroxine Injectable 150 MICROGram(s) IV Push at bedtime  metoprolol tartrate Injectable 5 milliGRAM(s) IV Push every 6 hours  modafinil 200 milliGRAM(s) Oral daily  pantoprazole  Injectable 40 milliGRAM(s) IV Push daily  phenytoin   Suspension 250 milliGRAM(s) Oral every 8 hours  simvastatin 20 milliGRAM(s) Oral at bedtime  sodium chloride 0.9%. 1000 milliLiter(s) (10 mL/Hr) IV Continuous <Continuous>    MEDICATIONS  (PRN):  acetaminophen    Suspension .. 650 milliGRAM(s) Oral every 6 hours PRN Temp greater or equal to 38C (100.4F), Mild Pain (1 - 3)  bisacodyl Suppository 10 milliGRAM(s) Rectal daily PRN Constipation  HYDROmorphone  Injectable 1 milliGRAM(s) IV Push every 1 hour PRN dyspnea  HYDROmorphone  Injectable 1 milliGRAM(s) IV Push every 1 hour PRN pain  LORazepam   Injectable 2 milliGRAM(s) IV Push every 30 minutes PRN Seizures acitivity  LORazepam   Injectable 1 milliGRAM(s) IV Push every 1 hour PRN Anxiety/ Agitation    ITEMS UNCHECKED ARE NOT PRESENT    PRESENT SYMPTOMS: [x ]Unable to obtain due to poor mentation 2/2 intubated, on mechanical ventilation with ICH  Source if other than patient:  [ ]Family   [ ]Team     Pain: [ ] yes [ ] no  QOL impact -   Location -                    Aggravating factors -  Quality -  Radiation -  Timing-  Severity (0-10 scale):  Minimal acceptable level (0-10 scale):     Dyspnea:                           [ ]Mild [ ]Moderate [ ]Severe  Anxiety:                             [ ]Mild [ ]Moderate [ ]Severe  Fatigue:                             [ ]Mild [ ]Moderate [ ]Severe  Nausea:                             [ ]Mild [ ]Moderate [ ]Severe  Loss of appetite:              [ ]Mild [ ]Moderate [ ]Severe  Constipation:                    [ ]Mild [ ]Moderate [ ]Severe    PAINAD Score: 0-2 (within the past 24 hours)     [email or web address] (Ctrl +  left click to view)  		  Other Symptoms:  [ ]All other review of systems negative  --> Unable to ascertain.     Palliative Performance Status Version 2:         10%         http://npcrc.org/files/news/palliative_performance_scale_ppsv2.pdf  PHYSICAL EXAM:  Vital Signs Last 24 Hrs  T(C): 37.8 (13 Jul 2020 09:05), Max: 37.8 (13 Jul 2020 09:05)  T(F): 100 (13 Jul 2020 09:05), Max: 100 (13 Jul 2020 09:05)  HR: 110 (13 Jul 2020 09:39) (107 - 123)  BP: 85/58 (13 Jul 2020 09:05) (82/61 - 85/58)  BP(mean): --  RR: 22 (13 Jul 2020 09:05) (15 - 22)  SpO2: 91% (13 Jul 2020 09:39) (91% - 97%) I&O's Summary    12 Jul 2020 07:01  -  13 Jul 2020 07:00  --------------------------------------------------------  IN: 0 mL / OUT: 350 mL / NET: -350 mL    GENERAL:  [ ]Alert  [ ]Oriented x   [ x]Lethargic  [ ]Cachexia  [ ]Unarousable  [ ]Verbal  [ x]Non-Verbal  Behavioral:   [ ] Anxiety  [ ] Delirium [ ] Agitation [ ] Other  HEENT:  [ ]Normal   [ ]Dry mouth   [x ]ET Tube/Trach  [ ]Oral lesions  PULMONARY:   [ ]Clear [ ]Tachypnea  [ ]Audible excessive secretions   [ ]Rhonchi        [ ]Right [ ]Left [ ]Bilateral  [ ]Crackles        [ ]Right [ ]Left [ ]Bilateral  [ ]Wheezing     [ ]Right [ ]Left [ ]Bilateral  [x ]Diminshed BS [ ]Right [ ]Left [x ]Bilateral    CARDIOVASCULAR:    [ ]Regular [ ]Irregular [x ]Tachy  [ ]Ubaldo [ ]Murmur [ ]Other  GASTROINTESTINAL:  [ x]Soft  [ ]Distended   [ x]+BS  [x ]Non tender [ ]Tender  [ ]PEG [x ]OGT/ NGT   Last BM: 7/13      GENITOURINARY:  [ ]Normal [x ] Incontinent   [ ]Oliguria/Anuria   [ x]Antoine and critical care monitoring  MUSCULOSKELETAL:   [ ]Normal   [ ]Weakness  [x ]Bed/Wheelchair bound [ ]Edema  NEUROLOGIC:   [ ]No focal deficits  [x ] Cognitive impairment  [ ] Dysphagia [ ]Dysarthria [ ] Paresis [ ]Other   SKIN:   [ ]Normal   [x ]Rash  underarms b/l; impaired skin integrity perineum, sacral/gluteal   [ ]Pressure ulcer(s)  [ ]y [ ]n  Present on admission      CRITICAL CARE:  [ ] Shock Present  [ ]Septic [ ]Cardiogenic [ ]Neurologic [ ]Hypovolemic  [ ]  Vasopressors [ ]  Inotropes   [x ] Respiratory failure present [ x] Mechanical Ventilation [ ] Non-invasive ventilatory support [ ] High-Flow  [x ] Acute  [ ] Chronic [ ] Hypoxic  [ ] Hypercarbic [ ] Other  [ ] Other organ failure: Brain    LABS: None New      RADIOLOGY & ADDITIONAL STUDIES:    EXAM:  XR CHEST PORTABLE ROUTINE 1V                            *** ADDENDUM 07/11/2020  ***  PROCEDURE DATE:  07/11/2020        Cardiac silhouette is enlarged. Left pleural effusion. Lungs otherwise clear. No pneumothorax. Enteric tube tip below diaphragm, side port at gastroesophageal junction. Endotracheal tube tip in right mainstem bronchus.    IMPRESSION:   Endotracheal tube tip in right mainstem bronchus.      PROTEIN CALORIE MALNUTRITION: [ ] mild [ ] moderate [ ] severe  [ ] underweight [ ] morbid obesity    https://www.andeal.org/vault/0740/web/files/ONC/Table_Clinical%20Characteristics%20to%20Document%20Malnutrition-White%20JV%20et%20al%044931.pdf    Height (cm): 162.6 (05-30-20 @ 00:49), 152.4 (05-29-20 @ 19:18), 154.9 (05-12-20 @ 21:48)  Weight (kg): 99.6 (05-30-20 @ 00:49), 136.1 (05-29-20 @ 19:18), 98.2 (05-12-20 @ 23:57)  BMI (kg/m2): 37.7 (05-30-20 @ 00:49), 51.5 (05-30-20 @ 00:49), 58.6 (05-29-20 @ 19:18)    [ x] PPSV2 < or = 30% [ ] significant weight loss [ ] poor nutritional intake [ ] anasarca Albumin, Serum: 3.5 g/dL (07-01-20 @ 05:20)    Artificial Nutrition [x ]     REFERRALS:   [ ]Chaplaincy  [ ] Hospice  [ ]Child Life  [ ]Social Work  [ ]Case management [ ]Holistic Therapy [ ] Physical Therapy [ ] Dietary   Goals of Care Document: GISEL Guzman (07-10-20 @ 17:20)  Goals of Care Conversation:   Advance Directives:  · Caregiver:  information could not be obtained    Conversation Discussion:  · Conversation Details  PCU Care plan delineation      HPI:  63 yo F with  hypothyroidism, HTN, IDDM, COPD/CHRIS on home O2, RA (on Prednisone), SBO s/p ex lap with lysis of adhesions c/b evisceration  history of trach and peg in the past after a protracted intubation > 30 days (reversal) ,  recent hospitalization in 3/2020 with encephalopathy, significantly hypothyroid (, without myxedema coma), small (stable) right sided SDH, UTI, catatonic reaction to VPA (now resolved) and DKA and psychiatric issues, 5/2020 hospitalization for GIB, Emphysematous cystitis, endometritis, Sigmoid colitis, morbid obesity, functional quadriplegia, a/w AMS found to have ICH now intubated > 40 days with no purposeful movement    We discussed global aspects of the patient's care with a focus on review goals and values.  I discussed  different approaches to care: curative, restorative, or purely symptom focused.  The decision makers Heavenly and Norris were present and eager to discussion options pre and post extubation.  The family was interested in possible home hospice if she survives extubation.    I recommended a purely symptom focused approach including the following:  DNAR to allow for a natural death in the event of asystole or a fatal arrythmia  DNI once extubated  No routine surveillance with blood draws  No new fever work up (no imaging, no blood cultures, no care escalation, no new antibiotics)  No artificial nutrition once the feeding is stopped in the periextubation period  No gastrostomy  No hemodialysis  APAP to address fevers  A trial of antibiotics to only address symptoms (e.g. dysuria)  A trial of IV fluids to address symptoms (e.g. seizure/myoclonus to address opiate induced neurotoxicity)  Maintenance of seizure medications  Maintenance of tramadol and gabapentin for OA.    Regarding my recommendations, they agreed to all of them.  They agreed to a transfer to the PCU as soon as bed is available  They agreed to extubation on Wednesday  They want to maintain current care until the periextubation period but realize that some adjustments may need to occur in as part of the process of extubation (feeds discontinuation  to reduce aspiration risk)  Additionally we discussed the possibility of fluid overload which would mean deescalation of IVF  Visitation regulations discussed with the PCU team and family, consensus found  Discussed symptom management and the fact that medications do not hasten death.  Explained the non-residential component of the PCU and its role in symptom management.  I explained that we would need to ask IV opioids in the event that her tramadol is not helpful since OA was a major part of her symptom burden.  Post extubation prognosis of minutes to hours offered.  Explained that she may die prior to extubation.    RADHA placed in the chart with an incapacity form  Case discussed with the fellow on call    Electronic Signatures for Addendum Section:   Arsalan Guzman) (Signed Addendum 10-Jul-2020 17:45)    ACP >16 min  Arsalan Guzman) (Signed Addendum 10-Jul-2020 18:36)    Family also agreed to no telemetry or continuous pulse ox.    Electronic Signatures:  Arsalan Guzman)  (Signed 10-Jul-2020 17:52)  	Authored: Goals of Care Conversation      Last Updated: 10-Jul-2020 18:36 by Arsalan Guzman)

## 2020-07-13 NOTE — PROGRESS NOTE ADULT - ATTENDING COMMENTS
I have personally seen and examined this patient and agree with the above assessment and plan, which I have reviewed and edited where appropriate.  64 year old female with large left non-traumatic ICH and poor exam.  No realistic hope for neurologic recovery that is meaningful.  Seizures managed with 3 AEDs and ativan PRN.  Started low dose analgesia for pain.  Family at bedside,  educated as to what to expect.  Questions answered.  Emotional support provided.

## 2020-07-13 NOTE — PROGRESS NOTE ADULT - PROBLEM SELECTOR PLAN 9
Family at bedside today, discussed with family all adjustment of medications with ativan and Dilaudid. Family ( Son Wiliam)  wanted to know if patient might  even prior to the extubation to take place on 7/15 since the BP is low. It was explained to him that, yes there is a possibility that may  due to progression of dx.  wiliam expressed understanding. all questions answered.  emotional support provided.

## 2020-07-14 DIAGNOSIS — K11.7 DISTURBANCES OF SALIVARY SECRETION: ICD-10-CM

## 2020-07-14 LAB
GLUCOSE BLDC GLUCOMTR-MCNC: 294 MG/DL — HIGH (ref 70–99)
GLUCOSE BLDC GLUCOMTR-MCNC: 323 MG/DL — HIGH (ref 70–99)
GLUCOSE BLDC GLUCOMTR-MCNC: 328 MG/DL — HIGH (ref 70–99)

## 2020-07-14 PROCEDURE — 99233 SBSQ HOSP IP/OBS HIGH 50: CPT | Mod: GC

## 2020-07-14 RX ORDER — INSULIN LISPRO 100/ML
VIAL (ML) SUBCUTANEOUS EVERY 6 HOURS
Refills: 0 | Status: DISCONTINUED | OUTPATIENT
Start: 2020-07-14 | End: 2020-07-14

## 2020-07-14 RX ORDER — SCOPALAMINE 1 MG/3D
1 PATCH, EXTENDED RELEASE TRANSDERMAL
Refills: 0 | Status: DISCONTINUED | OUTPATIENT
Start: 2020-07-14 | End: 2020-07-14

## 2020-07-14 RX ADMIN — Medication 650 MILLIGRAM(S): at 11:11

## 2020-07-14 RX ADMIN — HYDROMORPHONE HYDROCHLORIDE 1 MILLIGRAM(S): 2 INJECTION INTRAMUSCULAR; INTRAVENOUS; SUBCUTANEOUS at 17:22

## 2020-07-14 RX ADMIN — HYDROMORPHONE HYDROCHLORIDE 1 MILLIGRAM(S): 2 INJECTION INTRAMUSCULAR; INTRAVENOUS; SUBCUTANEOUS at 00:45

## 2020-07-14 RX ADMIN — Medication 650 MILLIGRAM(S): at 12:30

## 2020-07-14 RX ADMIN — HYDROMORPHONE HYDROCHLORIDE 1 MILLIGRAM(S): 2 INJECTION INTRAMUSCULAR; INTRAVENOUS; SUBCUTANEOUS at 07:38

## 2020-07-14 RX ADMIN — HYDROMORPHONE HYDROCHLORIDE 0.5 MILLIGRAM(S): 2 INJECTION INTRAMUSCULAR; INTRAVENOUS; SUBCUTANEOUS at 13:01

## 2020-07-14 RX ADMIN — HYDROMORPHONE HYDROCHLORIDE 0.5 MILLIGRAM(S): 2 INJECTION INTRAMUSCULAR; INTRAVENOUS; SUBCUTANEOUS at 05:27

## 2020-07-14 RX ADMIN — ENOXAPARIN SODIUM 30 MILLIGRAM(S): 100 INJECTION SUBCUTANEOUS at 05:27

## 2020-07-14 RX ADMIN — HYDROMORPHONE HYDROCHLORIDE 0.5 MILLIGRAM(S): 2 INJECTION INTRAMUSCULAR; INTRAVENOUS; SUBCUTANEOUS at 11:11

## 2020-07-14 RX ADMIN — Medication 150 MICROGRAM(S): at 05:27

## 2020-07-14 RX ADMIN — HYDROMORPHONE HYDROCHLORIDE 0.5 MILLIGRAM(S): 2 INJECTION INTRAMUSCULAR; INTRAVENOUS; SUBCUTANEOUS at 22:52

## 2020-07-14 RX ADMIN — HYDROMORPHONE HYDROCHLORIDE 0.5 MILLIGRAM(S): 2 INJECTION INTRAMUSCULAR; INTRAVENOUS; SUBCUTANEOUS at 03:17

## 2020-07-14 RX ADMIN — Medication 250 MILLIGRAM(S): at 05:29

## 2020-07-14 RX ADMIN — HYDROMORPHONE HYDROCHLORIDE 0.5 MILLIGRAM(S): 2 INJECTION INTRAMUSCULAR; INTRAVENOUS; SUBCUTANEOUS at 18:07

## 2020-07-14 NOTE — PROGRESS NOTE ADULT - PROBLEM SELECTOR PLAN 5
Intubated > 40 days on mechanical ventilation with vent settings at 450/30/5/14  On 7/11/2020, CXR showed ET tube in right mainstem bronchus. ET tube adjustment by Respiratory therapy was done. Repeat chest xray showed ET tube w/ appropriate placement.  Plan is for compassionate extubation on Wednesday 7/15 PPSv2 prior to admission: 30  Current functional PPSv2: 10  Nursing care required: Total assistance with all ADLs  Code status documented: DNR

## 2020-07-14 NOTE — PROGRESS NOTE ADULT - PROBLEM SELECTOR PLAN 2
Most likely 2/2 to impaired skin integrity in the sacral/ Gluteal area, IVH and intubation/immobility  On Dilaudid 0.5 mg IVP q 4 ATC and Dilaudid 1 mg IVP q 1 hour PRN for pain

## 2020-07-14 NOTE — PROGRESS NOTE ADULT - ATTENDING COMMENTS
I have personally seen and examined this patient and agree with the above assessment and plan, which I have reviewed and edited where appropriate.  64 year old female with large left non-traumatic ICH and poor exam.  No realistic hope for neurologic recovery that is meaningful.  Seizures managed with 3 AEDs and ativan PRN.   Family at bedside, anticipate compassionate extubation tomorrow at 11 AM.  See discussion above.

## 2020-07-14 NOTE — PROGRESS NOTE ADULT - PROBLEM SELECTOR PLAN 6
No intervention as per NSCU  Plan for compassionate extubation 7/15 Intubated > 40 days on mechanical ventilation with vent settings at 450/30/5/14  On 7/11/2020, CXR showed ET tube in right mainstem bronchus. ET tube adjustment by Respiratory therapy was done. Repeat chest xray showed ET tube w/ appropriate placement.  Plan is for compassionate extubation on Wednesday 7/15

## 2020-07-14 NOTE — PROGRESS NOTE ADULT - SUBJECTIVE AND OBJECTIVE BOX
GAP TEAM PALLIATIVE CARE UNIT PROGRESS NOTE:      [  ] Patient on hospice program.    INDICATION FOR PALLIATIVE CARE UNIT SERVICES: Symptom management, in setting of left frontal ICH/SAH/IVH/ seizures/ Dyspnea.  Plan for compassionate extubation on Wednesday 7/15      INTERVAL HPI/OVERNIGHT EVENTS: patient  with fever overnight temp 102---> 100.1, Tylenol suppository administered, Tachycardic at 118,  Received Dilaudid 1mg x 2 for Dyspnea.  No seizure like activity overnight.  Remains intubated, Vent settings: 450/30/5/14.    DNR on chart: Yes    Allergies    Depakote (Other)  Seroquel (Other (Severe))    Intolerances    MEDICATIONS  (STANDING):  chlorhexidine 0.12% Liquid 15 milliLiter(s) Oral Mucosa every 12 hours  enoxaparin Injectable 30 milliGRAM(s) SubCutaneous two times a day  fosphenytoin IVPB 250 milliGRAM(s) PE IV Intermittent every 8 hours  HYDROmorphone  Injectable 0.5 milliGRAM(s) IV Push every 4 hours  insulin lispro (HumaLOG) corrective regimen sliding scale   SubCutaneous every 6 hours  lacosamide IVPB 200 milliGRAM(s) IV Intermittent every 12 hours  levETIRAcetam  IVPB 1000 milliGRAM(s) IV Intermittent every 12 hours  levothyroxine Injectable 75 MICROGram(s) IV Push at bedtime  sodium chloride 0.9%. 1000 milliLiter(s) (10 mL/Hr) IV Continuous <Continuous>    MEDICATIONS  (PRN):  acetaminophen    Suspension .. 650 milliGRAM(s) Oral every 6 hours PRN Temp greater or equal to 38C (100.4F)  bisacodyl Suppository 10 milliGRAM(s) Rectal daily PRN Constipation  HYDROmorphone  Injectable 1 milliGRAM(s) IV Push every 1 hour PRN dyspnea  HYDROmorphone  Injectable 1 milliGRAM(s) IV Push every 1 hour PRN pain  LORazepam   Injectable 1 milliGRAM(s) IV Push every 1 hour PRN Anxiety/ Agitation  LORazepam   Injectable 2 milliGRAM(s) IV Push every 15 minutes PRN Seizure Activity    ITEMS UNCHECKED ARE NOT PRESENT    PRESENT SYMPTOMS: [ x]Unable to obtain due to poor mentation 2/2 intubated, on mechanical ventilation with ICH    Source if other than patient:  [ ]Family   [ ]Team     Pain: [ ] yes [ ] no  QOL impact -   Location -                    Aggravating factors -  Quality -  Radiation -  Timing-  Severity (0-10 scale):  Minimal acceptable level (0-10 scale):     Dyspnea:                           [ ]Mild [ ]Moderate [ ]Severe  Anxiety:                             [ ]Mild [ ]Moderate [ ]Severe  Fatigue:                             [ ]Mild [ ]Moderate [ ]Severe  Nausea:                             [ ]Mild [ ]Moderate [ ]Severe  Loss of appetite:              [ ]Mild [ ]Moderate [ ]Severe  Constipation:                    [ ]Mild [ ]Moderate [ ]Severe    PAINAD Score: 1 in the past 24 hours  ( Not relaxed )     [email or web address].[email or web address]/[email or web address] (Ctrl +  left click to view)  		  Other Symptoms:  [ ]All other review of systems negative     Palliative Performance Status Version 2:       10  %         http://npcrc.org/files/news/palliative_performance_scale_ppsv2.pdf  PHYSICAL EXAM:  Vital Signs Last 24 Hrs  T(C): 37.8 (14 Jul 2020 07:52), Max: 38.9 (13 Jul 2020 18:00)  T(F): 100.1 (14 Jul 2020 07:52), Max: 102 (13 Jul 2020 18:00)  HR: 116 (14 Jul 2020 09:22) (115 - 118)  BP: 106/67 (14 Jul 2020 07:52) (80/62 - 106/67)  BP(mean): --  RR: --  SpO2: 94% (14 Jul 2020 09:22) (90% - 94%) I&O's Summary    13 Jul 2020 07:01  -  14 Jul 2020 07:00  --------------------------------------------------------  IN: 220 mL / OUT: 300 mL / NET: -80 mL    GENERAL:  [ ]Alert  [ ]Oriented x   [x ]Lethargic  [ ]Cachexia  [ ]Unarousable  [ ]Verbal  [ x]Non-Verbal  Behavioral:   [ ] Anxiety  [ ] Delirium [ ] Agitation [ ] Other  HEENT:  [ ]Normal   [ ]Dry mouth   [x ]ET Tube/Trach  [ ]Oral lesions  PULMONARY:   [ ]Clear [ ]Tachypnea  [ ]Audible excessive secretions   [ ]Rhonchi        [ ]Right [ ]Left [ ]Bilateral  [ ]Crackles        [ ]Right [ ]Left [ ]Bilateral  [ ]Wheezing     [ ]Right [ ]Left [ ]Bilateral  [x ]Diminshed BS [ ]Right [ ]Left [x ]Bilateral    CARDIOVASCULAR:    [ ]Regular [ ]Irregular [ x]Tachy  [ ]Ubaldo [ ]Murmur [ ]Other  GASTROINTESTINAL:  [x ]Soft  [ ]Distended   [ x]+BS  [ x]Non tender [ ]Tender  [ ]PEG [ x]OGT/ NGT     Last BM: 7/13      GENITOURINARY:  [ ]Normal [ ] Incontinent   [ ]Oliguria/Anuria   [x ]Antoine  MUSCULOSKELETAL:   [ ]Normal   [ ]Weakness  [ x]Bed/Wheelchair bound [ ]Edema  NEUROLOGIC:   [ ]No focal deficits  [x ] Cognitive impairment  [ ] Dysphagia [ ]Dysarthria [ ] Paresis [ ]Other   SKIN:   [ ]Normal  [x]  Rash  underarms b/l; impaired skin integrity perineum, sacral/gluteal  [ ]Pressure ulcer(s)  [ ]y [ ]n  Present on admission      CRITICAL CARE:  [ ] Shock Present  [ ]Septic [ ]Cardiogenic [ ]Neurologic [ ]Hypovolemic  [ ]  Vasopressors [ ]  Inotropes   [x ] Respiratory failure present [ x] Mechanical Ventilation [ ] Non-invasive ventilatory support [ ] High-Flow  [x ] Acute  [ ] Chronic [ ] Hypoxic  [ ] Hypercarbic [ ] Other  [ ] Other organ failure : Brain    LABS: None new           RADIOLOGY & ADDITIONAL STUDIES: None new     PROTEIN CALORIE MALNUTRITION: [ ] mild [ ] moderate [ ] severe  [ ] underweight [ ] morbid obesity    https://www.andeal.org/vault/5640/web/files/ONC/Table_Clinical%20Characteristics%20to%20Document%20Malnutrition-White%20JV%20et%20al%735096.pdf    Height (cm): 162.6 (05-30-20 @ 00:49), 152.4 (05-29-20 @ 19:18), 154.9 (05-12-20 @ 21:48)  Weight (kg): 99.6 (05-30-20 @ 00:49), 136.1 (05-29-20 @ 19:18), 98.2 (05-12-20 @ 23:57)  BMI (kg/m2): 37.7 (05-30-20 @ 00:49), 51.5 (05-30-20 @ 00:49), 58.6 (05-29-20 @ 19:18)    [x ] PPSV2 < or = 30% [ ] significant weight loss [ ] poor nutritional intake [ ] anasarca Albumin, Serum: 3.5 g/dL (07-01-20 @ 05:20)    Artificial Nutrition [ ]     REFERRALS:   [ ]Chaplaincy  [ ] Hospice  [ ]Child Life  [ ]Social Work  [ ]Case management [ ]Holistic Therapy [ ] Physical Therapy [ ] Dietary   Goals of Care Document: GISEL Guzman (07-10-20 @ 17:20)  Goals of Care Conversation:   Advance Directives:  · Caregiver:  information could not be obtained    Conversation Discussion:  · Conversation Details  PCU Care plan delineation      HPI:  63 yo F with  hypothyroidism, HTN, IDDM, COPD/CHRIS on home O2, RA (on Prednisone), SBO s/p ex lap with lysis of adhesions c/b evisceration  history of trach and peg in the past after a protracted intubation > 30 days (reversal) ,  recent hospitalization in 3/2020 with encephalopathy, significantly hypothyroid (, without myxedema coma), small (stable) right sided SDH, UTI, catatonic reaction to VPA (now resolved) and DKA and psychiatric issues, 5/2020 hospitalization for GIB, Emphysematous cystitis, endometritis, Sigmoid colitis, morbid obesity, functional quadriplegia, a/w AMS found to have ICH now intubated > 40 days with no purposeful movement    We discussed global aspects of the patient's care with a focus on review goals and values.  I discussed  different approaches to care: curative, restorative, or purely symptom focused.  The decision makers Heavenly and Norris were present and eager to discussion options pre and post extubation.  The family was interested in possible home hospice if she survives extubation.    I recommended a purely symptom focused approach including the following:  DNAR to allow for a natural death in the event of asystole or a fatal arrythmia  DNI once extubated  No routine surveillance with blood draws  No new fever work up (no imaging, no blood cultures, no care escalation, no new antibiotics)  No artificial nutrition once the feeding is stopped in the periextubation period  No gastrostomy  No hemodialysis  APAP to address fevers  A trial of antibiotics to only address symptoms (e.g. dysuria)  A trial of IV fluids to address symptoms (e.g. seizure/myoclonus to address opiate induced neurotoxicity)  Maintenance of seizure medications  Maintenance of tramadol and gabapentin for OA.    Regarding my recommendations, they agreed to all of them.  They agreed to a transfer to the PCU as soon as bed is available  They agreed to extubation on Wednesday  They want to maintain current care until the periextubation period but realize that some adjustments may need to occur in as part of the process of extubation (feeds discontinuation  to reduce aspiration risk)  Additionally we discussed the possibility of fluid overload which would mean deescalation of IVF  Visitation regulations discussed with the PCU team and family, consensus found  Discussed symptom management and the fact that medications do not hasten death.  Explained the non-residential component of the PCU and its role in symptom management.  I explained that we would need to ask IV opioids in the event that her tramadol is not helpful since OA was a major part of her symptom burden.  Post extubation prognosis of minutes to hours offered.  Explained that she may die prior to extubation.    RADHA placed in the chart with an incapacity form  Case discussed with the fellow on call    Electronic Signatures for Addendum Section:   Arsalan Guzman) (Signed Addendum 10-Jul-2020 17:45)    ACP >16 min  Arsalan Guzman) (Signed Addendum 10-Jul-2020 18:36)    Family also agreed to no telemetry or continuous pulse ox.    Electronic Signatures:  Arsalan Guzman)  (Signed 10-Jul-2020 17:52)  	Authored: Goals of Care Conversation      Last Updated: 10-Jul-2020 18:36 by Arsalan Guzman)

## 2020-07-14 NOTE — PROGRESS NOTE ADULT - ASSESSMENT
65 Y/O multiple medical issues as described above admitted with altered mental status  CTH with left frontal ICH/SAH/IVH. Palliative care consulted for goals of care. Pt transferred to PCU  for symptom management of left frontal ICH/SAH/IVH/ seizures/ Dyspnea.  Plan for compassionate extubation on Wednesday. 63 Y/O multiple medical issues as described above admitted with altered mental status  CTH with left frontal ICH/SAH/IVH. Palliative care consulted for goals of care. Pt transferred to PCU  for symptom management of left frontal ICH/SAH/IVH/ seizures/ Dyspnea.  Plan for compassionate extubation on Wednesday.    Family agrees for 11.00 am for Compassionate extubation.

## 2020-07-14 NOTE — PROGRESS NOTE ADULT - PROBLEM SELECTOR PLAN 10
Spoke to Wiliam at bedside who stated that patient has 7 children of which 6  are her biological children, family wanted to compassionate extubation to be done on 7/15 because 2 of his siblings had their birthdays on 7/7 and 7/14. As per wiliam ( the son at bedside) stated that he still thinks the decision for compassionate extubation was too soon, but he has no say as it is already agreed by the rest of the family and deemed it best for their mother. He further states that him and his sister Twila will be at bedside when compassionate extubation takes place tomorrow.   -spoke to family this afternoon, time for compassionate extubation is at 11.00 Am  Chapkarishma offered  emotional support provided.

## 2020-07-14 NOTE — PROGRESS NOTE ADULT - PROBLEM SELECTOR PLAN 4
PPSv2 prior to admission: 30  Current functional PPSv2: 10  Nursing care required: Total assistance with all ADLs  Code status documented: DNR Scopolamine patch  1 patch q 72 hours.

## 2020-07-14 NOTE — PROGRESS NOTE ADULT - PROBLEM SELECTOR PLAN 1
No seizure like activity overnight   On Keppra, Vimpat and dilantin  Ativan 2 mg IVP q 15 minutes PRN in place for seizures, stop after 2 doses.

## 2020-07-14 NOTE — PROGRESS NOTE ADULT - PROBLEM SELECTOR PLAN 9
Spoke to Wiliam at bedside who stated that patient has 7 children of which 6  are her biological children, family wanted to compassionate extubation to be done on 7/15 because 2 of his siblings had their birthdays on 7/7 and 7/14. As per wiliam ( the son at bedside) stated that he still thinks the decision for compassionate extubation was too soon, but he has no say as it is already agreed by the rest of the family and deemed it best for their mother. He further states that him and his sister Twila will be at bedside when compassionate extubation takes place tomorrow.   Chaplaincy offered  emotional support provided. Most Likely due to neurologic insult from ICH/SAH/IVH

## 2020-07-14 NOTE — PROGRESS NOTE ADULT - PROBLEM SELECTOR PLAN 3
2/2 to Hypoxic respiratory failure  C/W Dilaudid 0.5 mg IVP q 4 ATC and Dilaudid 1 mg IVP q 1 hour PRN for Dyspnea

## 2020-07-15 VITALS — HEART RATE: 110 BPM | OXYGEN SATURATION: 93 %

## 2020-07-15 PROCEDURE — 85014 HEMATOCRIT: CPT

## 2020-07-15 PROCEDURE — 82947 ASSAY GLUCOSE BLOOD QUANT: CPT

## 2020-07-15 PROCEDURE — 85290 CLOT FACTOR XIII FIBRIN STAB: CPT

## 2020-07-15 PROCEDURE — 83036 HEMOGLOBIN GLYCOSYLATED A1C: CPT

## 2020-07-15 PROCEDURE — 82803 BLOOD GASES ANY COMBINATION: CPT

## 2020-07-15 PROCEDURE — 80053 COMPREHEN METABOLIC PANEL: CPT

## 2020-07-15 PROCEDURE — 87086 URINE CULTURE/COLONY COUNT: CPT

## 2020-07-15 PROCEDURE — 86901 BLOOD TYPING SEROLOGIC RH(D): CPT

## 2020-07-15 PROCEDURE — 84145 PROCALCITONIN (PCT): CPT

## 2020-07-15 PROCEDURE — 85027 COMPLETE CBC AUTOMATED: CPT

## 2020-07-15 PROCEDURE — 81001 URINALYSIS AUTO W/SCOPE: CPT

## 2020-07-15 PROCEDURE — 84480 ASSAY TRIIODOTHYRONINE (T3): CPT

## 2020-07-15 PROCEDURE — 85730 THROMBOPLASTIN TIME PARTIAL: CPT

## 2020-07-15 PROCEDURE — 70450 CT HEAD/BRAIN W/O DYE: CPT

## 2020-07-15 PROCEDURE — 86255 FLUORESCENT ANTIBODY SCREEN: CPT

## 2020-07-15 PROCEDURE — 85520 HEPARIN ASSAY: CPT

## 2020-07-15 PROCEDURE — 86923 COMPATIBILITY TEST ELECTRIC: CPT

## 2020-07-15 PROCEDURE — 70498 CT ANGIOGRAPHY NECK: CPT

## 2020-07-15 PROCEDURE — P9047: CPT

## 2020-07-15 PROCEDURE — 82435 ASSAY OF BLOOD CHLORIDE: CPT

## 2020-07-15 PROCEDURE — 95714 VEEG EA 12-26 HR UNMNTR: CPT

## 2020-07-15 PROCEDURE — 85291 CLOT FACTOR XIII FIBRIN SCRN: CPT

## 2020-07-15 PROCEDURE — 84443 ASSAY THYROID STIM HORMONE: CPT

## 2020-07-15 PROCEDURE — P9037: CPT

## 2020-07-15 PROCEDURE — 95711 VEEG 2-12 HR UNMONITORED: CPT

## 2020-07-15 PROCEDURE — 86850 RBC ANTIBODY SCREEN: CPT

## 2020-07-15 PROCEDURE — 94002 VENT MGMT INPAT INIT DAY: CPT

## 2020-07-15 PROCEDURE — 80048 BASIC METABOLIC PNL TOTAL CA: CPT

## 2020-07-15 PROCEDURE — 36430 TRANSFUSION BLD/BLD COMPNT: CPT

## 2020-07-15 PROCEDURE — 80185 ASSAY OF PHENYTOIN TOTAL: CPT

## 2020-07-15 PROCEDURE — 80061 LIPID PANEL: CPT

## 2020-07-15 PROCEDURE — 84439 ASSAY OF FREE THYROXINE: CPT

## 2020-07-15 PROCEDURE — 70496 CT ANGIOGRAPHY HEAD: CPT

## 2020-07-15 PROCEDURE — 87070 CULTURE OTHR SPECIMN AEROBIC: CPT

## 2020-07-15 PROCEDURE — 70552 MRI BRAIN STEM W/DYE: CPT

## 2020-07-15 PROCEDURE — P9016: CPT

## 2020-07-15 PROCEDURE — 84100 ASSAY OF PHOSPHORUS: CPT

## 2020-07-15 PROCEDURE — 84436 ASSAY OF TOTAL THYROXINE: CPT

## 2020-07-15 PROCEDURE — U0003: CPT

## 2020-07-15 PROCEDURE — 87186 SC STD MICRODIL/AGAR DIL: CPT

## 2020-07-15 PROCEDURE — 80177 DRUG SCRN QUAN LEVETIRACETAM: CPT

## 2020-07-15 PROCEDURE — 99233 SBSQ HOSP IP/OBS HIGH 50: CPT | Mod: GC

## 2020-07-15 PROCEDURE — 83605 ASSAY OF LACTIC ACID: CPT

## 2020-07-15 PROCEDURE — 87040 BLOOD CULTURE FOR BACTERIA: CPT

## 2020-07-15 PROCEDURE — 93306 TTE W/DOPPLER COMPLETE: CPT

## 2020-07-15 PROCEDURE — 84295 ASSAY OF SERUM SODIUM: CPT

## 2020-07-15 PROCEDURE — 82330 ASSAY OF CALCIUM: CPT

## 2020-07-15 PROCEDURE — 83735 ASSAY OF MAGNESIUM: CPT

## 2020-07-15 PROCEDURE — 84132 ASSAY OF SERUM POTASSIUM: CPT

## 2020-07-15 PROCEDURE — 71045 X-RAY EXAM CHEST 1 VIEW: CPT

## 2020-07-15 PROCEDURE — 94003 VENT MGMT INPAT SUBQ DAY: CPT

## 2020-07-15 PROCEDURE — 82962 GLUCOSE BLOOD TEST: CPT

## 2020-07-15 PROCEDURE — 85610 PROTHROMBIN TIME: CPT

## 2020-07-15 PROCEDURE — 93970 EXTREMITY STUDY: CPT

## 2020-07-15 PROCEDURE — 95700 EEG CONT REC W/VID EEG TECH: CPT

## 2020-07-15 PROCEDURE — C9254: CPT

## 2020-07-15 PROCEDURE — 86900 BLOOD TYPING SEROLOGIC ABO: CPT

## 2020-07-15 RX ORDER — ROBINUL 0.2 MG/ML
0.4 INJECTION INTRAMUSCULAR; INTRAVENOUS EVERY 4 HOURS
Refills: 0 | Status: DISCONTINUED | OUTPATIENT
Start: 2020-07-15 | End: 2020-07-15

## 2020-07-15 RX ORDER — HYDROMORPHONE HYDROCHLORIDE 2 MG/ML
2 INJECTION INTRAMUSCULAR; INTRAVENOUS; SUBCUTANEOUS
Refills: 0 | Status: DISCONTINUED | OUTPATIENT
Start: 2020-07-15 | End: 2020-07-15

## 2020-07-15 RX ORDER — ACETAMINOPHEN 500 MG
650 TABLET ORAL EVERY 6 HOURS
Refills: 0 | Status: DISCONTINUED | OUTPATIENT
Start: 2020-07-15 | End: 2020-07-15

## 2020-07-15 RX ADMIN — HYDROMORPHONE HYDROCHLORIDE 2 MILLIGRAM(S): 2 INJECTION INTRAMUSCULAR; INTRAVENOUS; SUBCUTANEOUS at 12:40

## 2020-07-15 RX ADMIN — ROBINUL 0.4 MILLIGRAM(S): 0.2 INJECTION INTRAMUSCULAR; INTRAVENOUS at 12:16

## 2020-07-15 RX ADMIN — HYDROMORPHONE HYDROCHLORIDE 0.5 MILLIGRAM(S): 2 INJECTION INTRAMUSCULAR; INTRAVENOUS; SUBCUTANEOUS at 02:10

## 2020-07-15 RX ADMIN — HYDROMORPHONE HYDROCHLORIDE 0.5 MILLIGRAM(S): 2 INJECTION INTRAMUSCULAR; INTRAVENOUS; SUBCUTANEOUS at 06:15

## 2020-07-15 RX ADMIN — HYDROMORPHONE HYDROCHLORIDE 0.5 MILLIGRAM(S): 2 INJECTION INTRAMUSCULAR; INTRAVENOUS; SUBCUTANEOUS at 09:15

## 2020-07-15 RX ADMIN — HYDROMORPHONE HYDROCHLORIDE 1 MILLIGRAM(S): 2 INJECTION INTRAMUSCULAR; INTRAVENOUS; SUBCUTANEOUS at 12:16

## 2020-07-15 RX ADMIN — HYDROMORPHONE HYDROCHLORIDE 1 MILLIGRAM(S): 2 INJECTION INTRAMUSCULAR; INTRAVENOUS; SUBCUTANEOUS at 12:35

## 2020-07-15 RX ADMIN — Medication 1 MILLIGRAM(S): at 12:40

## 2020-07-15 NOTE — PROGRESS NOTE ADULT - PROBLEM SELECTOR PLAN 10
Family at bedside for compassionate extubation today at 11.30   Chaplaincy at bedside.  emotional support provided. Family at bedside for compassionate extubation today at 11.30   Patient received Dilaudid 1mg IVP x 2, Dilaudid 2mg IVP x1, Ativan 1 mg IVP x 2, Rubinol 0.4 mg IVP x 1  Chaplaincy at bedside.  emotional support provided.

## 2020-07-15 NOTE — PROGRESS NOTE ADULT - PROBLEM SELECTOR PLAN 1
No seizure like activity in the past 24 hours  On Keppra, Vimpat and dilantin  Ativan 2 mg IVP q 15 minutes PRN in place for seizures, stop after 2 doses.

## 2020-07-15 NOTE — PROGRESS NOTE ADULT - SUBJECTIVE AND OBJECTIVE BOX
GAP TEAM PALLIATIVE CARE UNIT PROGRESS NOTE:      [  ] Patient on hospice program.    INDICATION FOR PALLIATIVE CARE UNIT SERVICES: Symptom management, in setting of left frontal ICH/SAH/IVH/ seizures/ Dyspnea.  Plan for compassionate extubation on today 7/15        INTERVAL HPI/OVERNIGHT EVENTS:  Overnight patient received Dilaudid 1 mg IVP  x 1 and Ativan 1 mg ivp x 1.  Had fevers yesterday, today fevers resolved. Appears comfortable.     Compassionate extubation today 7/15    Premedication in place:   -Dilaudid 1 mg IVP, Ativan im IVP, Robinul 0.4 mg IVP            DNR on chart: Yes    Allergies    Depakote (Other)  Seroquel (Other (Severe))    Intolerances    MEDICATIONS  (STANDING):  chlorhexidine 0.12% Liquid 15 milliLiter(s) Oral Mucosa every 12 hours  fosphenytoin IVPB 250 milliGRAM(s) PE IV Intermittent every 8 hours  HYDROmorphone  Injectable 0.5 milliGRAM(s) IV Push every 4 hours  lacosamide IVPB 200 milliGRAM(s) IV Intermittent every 12 hours  levETIRAcetam  IVPB 1000 milliGRAM(s) IV Intermittent every 12 hours  levothyroxine Injectable 75 MICROGram(s) IV Push at bedtime  scopolamine 1 mG/72 Hr(s) Patch 1 Patch Transdermal every 72 hours  sodium chloride 0.9%. 1000 milliLiter(s) (10 mL/Hr) IV Continuous <Continuous>    MEDICATIONS  (PRN):  acetaminophen    Suspension .. 650 milliGRAM(s) Oral every 6 hours PRN Temp greater or equal to 38C (100.4F)  bisacodyl Suppository 10 milliGRAM(s) Rectal daily PRN Constipation  glycopyrrolate Injectable 0.4 milliGRAM(s) IV Push every 4 hours PRN secretions  HYDROmorphone  Injectable 1 milliGRAM(s) IV Push every 1 hour PRN dyspnea  HYDROmorphone  Injectable 1 milliGRAM(s) IV Push every 1 hour PRN pain  LORazepam   Injectable 1 milliGRAM(s) IV Push every 1 hour PRN Anxiety/ Agitation  LORazepam   Injectable 2 milliGRAM(s) IV Push every 15 minutes PRN Seizure Activity    ITEMS UNCHECKED ARE NOT PRESENT    PRESENT SYMPTOMS: [x ]Unable to obtain due to poor mentation 2/2 intubated, on mechanical ventilation with ICH    Source if other than patient:  [ ]Family   [ ]Team     Pain: [ ] yes [ ] no  QOL impact -   Location -                    Aggravating factors -  Quality -  Radiation -  Timing-  Severity (0-10 scale):  Minimal acceptable level (0-10 scale):     Dyspnea:                           [ ]Mild [ ]Moderate [ ]Severe  Anxiety:                             [ ]Mild [ ]Moderate [ ]Severe  Fatigue:                             [ ]Mild [ ]Moderate [ ]Severe  Nausea:                             [ ]Mild [ ]Moderate [ ]Severe  Loss of appetite:              [ ]Mild [ ]Moderate [ ]Severe  Constipation:                    [ ]Mild [ ]Moderate [ ]Severe    PAINAD Score: 0    [email or web address] (Ctrl +  left click to view)  		  Other Symptoms:  [ ]All other review of systems negative     Palliative Performance Status Version 2:        10 %         http://npcrc.org/files/news/palliative_performance_scale_[email or web address].pdf  PHYSICAL EXAM:  Vital Signs Last 24 Hrs  T(C): 37.7 (15 Jul 2020 08:20), Max: 37.7 (15 Jul 2020 08:20)  T(F): 99.9 (15 Jul 2020 08:20), Max: 99.9 (15 Jul 2020 08:20)  HR: 110 (15 Jul 2020 09:36) (108 - 116)  BP: 106/70 (15 Jul 2020 08:20) (106/70 - 106/70)  BP(mean): --  RR: --  SpO2: 93% (15 Jul 2020 09:36) (92% - 96%) I&O's Summary    14 Jul 2020 07:01  -  15 Jul 2020 07:00  --------------------------------------------------------  IN: 0 mL / OUT: 350 mL / NET: -350 mL    GENERAL:  [ ]Alert  [ ]Oriented x   [x ]Lethargic  [ ]Cachexia  [ ]Unarousable  [ ]Verbal  [x ]Non-Verbal  Behavioral:   [ ] Anxiety  [ ] Delirium [ ] Agitation [ ] Other  HEENT:  [ ]Normal   [ ]Dry mouth   [x ]ET Tube/Trach  [ ]Oral lesions  PULMONARY:   [ ]Clear [ ]Tachypnea  [ ]Audible excessive secretions   [ ]Rhonchi        [ ]Right [ ]Left [ ]Bilateral  [ ]Crackles        [ ]Right [ ]Left [ ]Bilateral  [ ]Wheezing     [ ]Right [ ]Left [ ]Bilateral  [x ]Diminshed BS [ ]Right [ ]Left [ x]Bilateral    CARDIOVASCULAR:    [ ]Regular [ ]Irregular [x ]Tachy  [ ]Ubaldo [ ]Murmur [ ]Other  GASTROINTESTINAL:  [x ]Soft  [ ]Distended   [ x]+BS  [ x]Non tender [ ]Tender  [ ]PEG [ ]OGT/ NGT   Last BM:  7/13     GENITOURINARY:  [ ]Normal [ ] Incontinent   [ ]Oliguria/Anuria   [x ]Antoine  MUSCULOSKELETAL:   [ ]Normal   [ ]Weakness  [x ]Bed/Wheelchair bound [ ]Edema  NEUROLOGIC:   [ ]No focal deficits  [ x] Cognitive impairment  [ ] Dysphagia [ ]Dysarthria [ ] Paresis [ ]Other   SKIN:   [ ]Normal  [ ]Rash   [x] Rash  underarms b/l; impaired skin integrity perineum, sacral/gluteal  [ ]Pressure ulcer(s)  [ ]y [ ]n  Present on admission      CRITICAL CARE:  [ ] Shock Present  [ ]Septic [ ]Cardiogenic [ ]Neurologic [ ]Hypovolemic  [ ]  Vasopressors [ ]  Inotropes   [ x] Respiratory failure present [x ] Mechanical Ventilation [ ] Non-invasive ventilatory support [ ] High-Flow  [ x] Acute  [ ] Chronic [ ] Hypoxic  [ ] Hypercarbic [ ] Other  [ ] Other organ failure     LABS: None New      RADIOLOGY & ADDITIONAL STUDIES: None New    PROTEIN CALORIE MALNUTRITION: [ ] mild [ ] moderate [ ] severe  [ ] underweight [ ] morbid obesity    https://www.andeal.org/vault/2440/web/files/ONC/Table_Clinical%20Characteristics%20to%20Document%20Malnutrition-White%20JV%20et%20al%202012.pdf    Height (cm): 162.6 (05-30-20 @ 00:49), 152.4 (05-29-20 @ 19:18), 154.9 (05-12-20 @ 21:48)  Weight (kg): 99.6 (05-30-20 @ 00:49), 136.1 (05-29-20 @ 19:18), 98.2 (05-12-20 @ 23:57)  BMI (kg/m2): 37.7 (05-30-20 @ 00:49), 51.5 (05-30-20 @ 00:49), 58.6 (05-29-20 @ 19:18)    [x ] PPSV2 < or = 30% [ ] significant weight loss [ ] poor nutritional intake [ ] anasarca Albumin, Serum: 3.5 g/dL (07-01-20 @ 05:20)    Artificial Nutrition [ ]     REFERRALS:   [ ]Chaplaincy  [ ] Hospice  [ ]Child Life  [ ]Social Work  [ ]Case management [ ]Holistic Therapy [ ] Physical Therapy [ ] Dietary   Goals of Care Document: GISEL Guzman (07-10-20 @ 17:20)  Goals of Care Conversation:   Advance Directives:  · Caregiver:  information could not be obtained    Conversation Discussion:  · Conversation Details  PCU Care plan delineation      HPI:  63 yo F with  hypothyroidism, HTN, IDDM, COPD/CHRIS on home O2, RA (on Prednisone), SBO s/p ex lap with lysis of adhesions c/b evisceration  history of trach and peg in the past after a protracted intubation > 30 days (reversal) ,  recent hospitalization in 3/2020 with encephalopathy, significantly hypothyroid (, without myxedema coma), small (stable) right sided SDH, UTI, catatonic reaction to VPA (now resolved) and DKA and psychiatric issues, 5/2020 hospitalization for GIB, Emphysematous cystitis, endometritis, Sigmoid colitis, morbid obesity, functional quadriplegia, a/w AMS found to have ICH now intubated > 40 days with no purposeful movement    We discussed global aspects of the patient's care with a focus on review goals and values.  I discussed  different approaches to care: curative, restorative, or purely symptom focused.  The decision makers Heavenly and Norris were present and eager to discussion options pre and post extubation.  The family was interested in possible home hospice if she survives extubation.    I recommended a purely symptom focused approach including the following:  DNAR to allow for a natural death in the event of asystole or a fatal arrythmia  DNI once extubated  No routine surveillance with blood draws  No new fever work up (no imaging, no blood cultures, no care escalation, no new antibiotics)  No artificial nutrition once the feeding is stopped in the periextubation period  No gastrostomy  No hemodialysis  APAP to address fevers  A trial of antibiotics to only address symptoms (e.g. dysuria)  A trial of IV fluids to address symptoms (e.g. seizure/myoclonus to address opiate induced neurotoxicity)  Maintenance of seizure medications  Maintenance of tramadol and gabapentin for OA.    Regarding my recommendations, they agreed to all of them.  They agreed to a transfer to the PCU as soon as bed is available  They agreed to extubation on Wednesday  They want to maintain current care until the periextubation period but realize that some adjustments may need to occur in as part of the process of extubation (feeds discontinuation  to reduce aspiration risk)  Additionally we discussed the possibility of fluid overload which would mean deescalation of IVF  Visitation regulations discussed with the PCU team and family, consensus found  Discussed symptom management and the fact that medications do not hasten death.  Explained the non-residential component of the PCU and its role in symptom management.  I explained that we would need to ask IV opioids in the event that her tramadol is not helpful since OA was a major part of her symptom burden.  Post extubation prognosis of minutes to hours offered.  Explained that she may die prior to extubation.    RADHA placed in the chart with an incapacity form  Case discussed with the fellow on call    Electronic Signatures for Addendum Section:   Arsalan Guzman) (Signed Addendum 10-Jul-2020 17:45)    ACP >16 min  Arsalan Guzman) (Signed Addendum 10-Jul-2020 18:36)    Family also agreed to no telemetry or continuous pulse ox.    Electronic Signatures:  Arsalan Guzman)  (Signed 10-Jul-2020 17:52)  	Authored: Goals of Care Conversation      Last Updated: 10-Jul-2020 18:36 by Arsalan Guzman) GAP TEAM PALLIATIVE CARE UNIT PROGRESS NOTE:      [  ] Patient on hospice program.    INDICATION FOR PALLIATIVE CARE UNIT SERVICES: Symptom management, in setting of left frontal ICH/SAH/IVH/ seizures/ Dyspnea.  Plan for compassionate extubation on today 7/15        INTERVAL HPI/OVERNIGHT EVENTS:  Overnight patient received Dilaudid 1 mg IVP  x 1 and Ativan 1 mg ivp x 2.  Had fevers yesterday, today fevers resolved. Appears comfortable.     Compassionate extubation today 7/15    Premedication in place:   -Dilaudid 1 mg IVP, Ativan im IVP, Robinul 0.4 mg IVP            DNR on chart: Yes    Allergies    Depakote (Other)  Seroquel (Other (Severe))    Intolerances    MEDICATIONS  (STANDING):  chlorhexidine 0.12% Liquid 15 milliLiter(s) Oral Mucosa every 12 hours  fosphenytoin IVPB 250 milliGRAM(s) PE IV Intermittent every 8 hours  HYDROmorphone  Injectable 0.5 milliGRAM(s) IV Push every 4 hours  lacosamide IVPB 200 milliGRAM(s) IV Intermittent every 12 hours  levETIRAcetam  IVPB 1000 milliGRAM(s) IV Intermittent every 12 hours  levothyroxine Injectable 75 MICROGram(s) IV Push at bedtime  scopolamine 1 mG/72 Hr(s) Patch 1 Patch Transdermal every 72 hours  sodium chloride 0.9%. 1000 milliLiter(s) (10 mL/Hr) IV Continuous <Continuous>    MEDICATIONS  (PRN):  acetaminophen    Suspension .. 650 milliGRAM(s) Oral every 6 hours PRN Temp greater or equal to 38C (100.4F)  bisacodyl Suppository 10 milliGRAM(s) Rectal daily PRN Constipation  glycopyrrolate Injectable 0.4 milliGRAM(s) IV Push every 4 hours PRN secretions  HYDROmorphone  Injectable 1 milliGRAM(s) IV Push every 1 hour PRN dyspnea  HYDROmorphone  Injectable 1 milliGRAM(s) IV Push every 1 hour PRN pain  LORazepam   Injectable 1 milliGRAM(s) IV Push every 1 hour PRN Anxiety/ Agitation  LORazepam   Injectable 2 milliGRAM(s) IV Push every 15 minutes PRN Seizure Activity    ITEMS UNCHECKED ARE NOT PRESENT    PRESENT SYMPTOMS: [x ]Unable to obtain due to poor mentation 2/2 intubated, on mechanical ventilation with ICH    Source if other than patient:  [ ]Family   [ ]Team     Pain: [ ] yes [ ] no  QOL impact -   Location -                    Aggravating factors -  Quality -  Radiation -  Timing-  Severity (0-10 scale):  Minimal acceptable level (0-10 scale):     Dyspnea:                           [ ]Mild [ ]Moderate [ ]Severe  Anxiety:                             [ ]Mild [ ]Moderate [ ]Severe  Fatigue:                             [ ]Mild [ ]Moderate [ ]Severe  Nausea:                             [ ]Mild [ ]Moderate [ ]Severe  Loss of appetite:              [ ]Mild [ ]Moderate [ ]Severe  Constipation:                    [ ]Mild [ ]Moderate [ ]Severe    PAINAD Score: 0    [email or web address] (Ctrl +  left click to view)  		  Other Symptoms:  [ ]All other review of systems negative     Palliative Performance Status Version 2:        10 %         http://npcrc.org/files/news/palliative_performance_scale_ppsv2.pdf  PHYSICAL EXAM:  Vital Signs Last 24 Hrs  T(C): 37.7 (15 Jul 2020 08:20), Max: 37.7 (15 Jul 2020 08:20)  T(F): 99.9 (15 Jul 2020 08:20), Max: 99.9 (15 Jul 2020 08:20)  HR: 110 (15 Jul 2020 09:36) (108 - 116)  BP: 106/70 (15 Jul 2020 08:20) (106/70 - 106/70)  BP(mean): --  RR: --  SpO2: 93% (15 Jul 2020 09:36) (92% - 96%) I&O's Summary    14 Jul 2020 07:01  -  15 Jul 2020 07:00  --------------------------------------------------------  IN: 0 mL / OUT: 350 mL / NET: -350 mL    GENERAL:  [ ]Alert  [ ]Oriented x   [x ]Lethargic  [ ]Cachexia  [ ]Unarousable  [ ]Verbal  [x ]Non-Verbal  Behavioral:   [ ] Anxiety  [ ] Delirium [ ] Agitation [ ] Other  HEENT:  [ ]Normal   [ ]Dry mouth   [x ]ET Tube/Trach  [ ]Oral lesions  PULMONARY:   [ ]Clear [ ]Tachypnea  [ ]Audible excessive secretions   [ ]Rhonchi        [ ]Right [ ]Left [ ]Bilateral  [ ]Crackles        [ ]Right [ ]Left [ ]Bilateral  [ ]Wheezing     [ ]Right [ ]Left [ ]Bilateral  [x ]Diminshed BS [ ]Right [ ]Left [ x]Bilateral    CARDIOVASCULAR:    [ ]Regular [ ]Irregular [x ]Tachy  [ ]Ubaldo [ ]Murmur [ ]Other  GASTROINTESTINAL:  [x ]Soft  [ ]Distended   [ x]+BS  [ x]Non tender [ ]Tender  [ ]PEG [ ]OGT/ NGT   Last BM:  7/13     GENITOURINARY:  [ ]Normal [ ] Incontinent   [ ]Oliguria/Anuria   [x ]Antoine  MUSCULOSKELETAL:   [ ]Normal   [ ]Weakness  [x ]Bed/Wheelchair bound [ ]Edema  NEUROLOGIC:   [ ]No focal deficits  [ x] Cognitive impairment  [ ] Dysphagia [ ]Dysarthria [ ] Paresis [ ]Other   SKIN:   [ ]Normal  [ ]Rash   [x] Rash  underarms b/l; impaired skin integrity perineum, sacral/gluteal  [ ]Pressure ulcer(s)  [ ]y [ ]n  Present on admission      CRITICAL CARE:  [ ] Shock Present  [ ]Septic [ ]Cardiogenic [ ]Neurologic [ ]Hypovolemic  [ ]  Vasopressors [ ]  Inotropes   [ x] Respiratory failure present [x ] Mechanical Ventilation [ ] Non-invasive ventilatory support [ ] High-Flow  [ x] Acute  [ ] Chronic [ ] Hypoxic  [ ] Hypercarbic [ ] Other  [ ] Other organ failure     LABS: None New      RADIOLOGY & ADDITIONAL STUDIES: None New    PROTEIN CALORIE MALNUTRITION: [ ] mild [ ] moderate [ ] severe  [ ] underweight [ ] morbid obesity    https://www.andeal.org/vault/2440/web/files/ONC/Table_Clinical%20Characteristics%20to%20Document%20Malnutrition-White%20JV%20et%20al%202012.pdf    Height (cm): 162.6 (05-30-20 @ 00:49), 152.4 (05-29-20 @ 19:18), 154.9 (05-12-20 @ 21:48)  Weight (kg): 99.6 (05-30-20 @ 00:49), 136.1 (05-29-20 @ 19:18), 98.2 (05-12-20 @ 23:57)  BMI (kg/m2): 37.7 (05-30-20 @ 00:49), 51.5 (05-30-20 @ 00:49), 58.6 (05-29-20 @ 19:18)    [x ] PPSV2 < or = 30% [ ] significant weight loss [ ] poor nutritional intake [ ] anasarca Albumin, Serum: 3.5 g/dL (07-01-20 @ 05:20)    Artificial Nutrition [ ]     REFERRALS:   [ ]Chaplaincy  [ ] Hospice  [ ]Child Life  [ ]Social Work  [ ]Case management [ ]Holistic Therapy [ ] Physical Therapy [ ] Dietary   Goals of Care Document: GISEL Guzman (07-10-20 @ 17:20)  Goals of Care Conversation:   Advance Directives:  · Caregiver:  information could not be obtained    Conversation Discussion:  · Conversation Details  PCU Care plan delineation      HPI:  63 yo F with  hypothyroidism, HTN, IDDM, COPD/CHRIS on home O2, RA (on Prednisone), SBO s/p ex lap with lysis of adhesions c/b evisceration  history of trach and peg in the past after a protracted intubation > 30 days (reversal) ,  recent hospitalization in 3/2020 with encephalopathy, significantly hypothyroid (, without myxedema coma), small (stable) right sided SDH, UTI, catatonic reaction to VPA (now resolved) and DKA and psychiatric issues, 5/2020 hospitalization for GIB, Emphysematous cystitis, endometritis, Sigmoid colitis, morbid obesity, functional quadriplegia, a/w AMS found to have ICH now intubated > 40 days with no purposeful movement    We discussed global aspects of the patient's care with a focus on review goals and values.  I discussed  different approaches to care: curative, restorative, or purely symptom focused.  The decision makers Heavenly and Norris were present and eager to discussion options pre and post extubation.  The family was interested in possible home hospice if she survives extubation.    I recommended a purely symptom focused approach including the following:  DNAR to allow for a natural death in the event of asystole or a fatal arrythmia  DNI once extubated  No routine surveillance with blood draws  No new fever work up (no imaging, no blood cultures, no care escalation, no new antibiotics)  No artificial nutrition once the feeding is stopped in the periextubation period  No gastrostomy  No hemodialysis  APAP to address fevers  A trial of antibiotics to only address symptoms (e.g. dysuria)  A trial of IV fluids to address symptoms (e.g. seizure/myoclonus to address opiate induced neurotoxicity)  Maintenance of seizure medications  Maintenance of tramadol and gabapentin for OA.    Regarding my recommendations, they agreed to all of them.  They agreed to a transfer to the PCU as soon as bed is available  They agreed to extubation on Wednesday  They want to maintain current care until the periextubation period but realize that some adjustments may need to occur in as part of the process of extubation (feeds discontinuation  to reduce aspiration risk)  Additionally we discussed the possibility of fluid overload which would mean deescalation of IVF  Visitation regulations discussed with the PCU team and family, consensus found  Discussed symptom management and the fact that medications do not hasten death.  Explained the non-residential component of the PCU and its role in symptom management.  I explained that we would need to ask IV opioids in the event that her tramadol is not helpful since OA was a major part of her symptom burden.  Post extubation prognosis of minutes to hours offered.  Explained that she may die prior to extubation.    RADHA placed in the chart with an incapacity form  Case discussed with the fellow on call    Electronic Signatures for Addendum Section:   Arsalan Guzman) (Signed Addendum 10-Jul-2020 17:45)    ACP >16 min  Arsalan Guzman) (Signed Addendum 10-Jul-2020 18:36)    Family also agreed to no telemetry or continuous pulse ox.    Electronic Signatures:  Arsalan Guzman)  (Signed 10-Jul-2020 17:52)  	Authored: Goals of Care Conversation      Last Updated: 10-Jul-2020 18:36 by Arsalan Guzman) GAP TEAM PALLIATIVE CARE UNIT PROGRESS NOTE:      [  ] Patient on hospice program.    INDICATION FOR PALLIATIVE CARE UNIT SERVICES: Symptom management, in setting of left frontal ICH/SAH/IVH/ seizures/ Dyspnea.  Plan for compassionate extubation on today 7/15        INTERVAL HPI/OVERNIGHT EVENTS:  Overnight patient received Dilaudid 1 mg IVP  x 1 and Ativan 1 mg ivp x 2.  Had fevers yesterday, today fevers resolved. Appears comfortable.     Compassionate extubation today 7/15     Premedication in place:   -Dilaudid 1 mg IVP, Ativan 1 mg IVP, Robinul 0.4 mg IVP          DNR on chart: Yes    Allergies    Depakote (Other)  Seroquel (Other (Severe))    Intolerances    MEDICATIONS  (STANDING):  chlorhexidine 0.12% Liquid 15 milliLiter(s) Oral Mucosa every 12 hours  fosphenytoin IVPB 250 milliGRAM(s) PE IV Intermittent every 8 hours  HYDROmorphone  Injectable 0.5 milliGRAM(s) IV Push every 4 hours  lacosamide IVPB 200 milliGRAM(s) IV Intermittent every 12 hours  levETIRAcetam  IVPB 1000 milliGRAM(s) IV Intermittent every 12 hours  levothyroxine Injectable 75 MICROGram(s) IV Push at bedtime  scopolamine 1 mG/72 Hr(s) Patch 1 Patch Transdermal every 72 hours  sodium chloride 0.9%. 1000 milliLiter(s) (10 mL/Hr) IV Continuous <Continuous>    MEDICATIONS  (PRN):  acetaminophen    Suspension .. 650 milliGRAM(s) Oral every 6 hours PRN Temp greater or equal to 38C (100.4F)  bisacodyl Suppository 10 milliGRAM(s) Rectal daily PRN Constipation  glycopyrrolate Injectable 0.4 milliGRAM(s) IV Push every 4 hours PRN secretions  HYDROmorphone  Injectable 1 milliGRAM(s) IV Push every 1 hour PRN dyspnea  HYDROmorphone  Injectable 1 milliGRAM(s) IV Push every 1 hour PRN pain  LORazepam   Injectable 1 milliGRAM(s) IV Push every 1 hour PRN Anxiety/ Agitation  LORazepam   Injectable 2 milliGRAM(s) IV Push every 15 minutes PRN Seizure Activity    ITEMS UNCHECKED ARE NOT PRESENT    PRESENT SYMPTOMS: [x ]Unable to obtain due to poor mentation 2/2 intubated, on mechanical ventilation with ICH    Source if other than patient:  [ ]Family   [ ]Team     Pain: [ ] yes [ ] no  QOL impact -   Location -                    Aggravating factors -  Quality -  Radiation -  Timing-  Severity (0-10 scale):  Minimal acceptable level (0-10 scale):     Dyspnea:                           [ ]Mild [ ]Moderate [ ]Severe  Anxiety:                             [ ]Mild [ ]Moderate [ ]Severe  Fatigue:                             [ ]Mild [ ]Moderate [ ]Severe  Nausea:                             [ ]Mild [ ]Moderate [ ]Severe  Loss of appetite:              [ ]Mild [ ]Moderate [ ]Severe  Constipation:                    [ ]Mild [ ]Moderate [ ]Severe    PAINAD Score: 0 to 2 in the past 24 hours ( Not relaxed )     [email or web address] (Ctrl +  left click to view)  		  Other Symptoms:  [ ]All other review of systems negative     Palliative Performance Status Version 2:        10 %         [email or web address]/files/[email or web address]_performance_scale_[email or web address].pdf  PHYSICAL EXAM:  Vital Signs Last 24 Hrs  T(C): 37.7 (15 Jul 2020 08:20), Max: 37.7 (15 Jul 2020 08:20)  T(F): 99.9 (15 Jul 2020 08:20), Max: 99.9 (15 Jul 2020 08:20)  HR: 110 (15 Jul 2020 09:36) (108 - 116)  BP: 106/70 (15 Jul 2020 08:20) (106/70 - 106/70)  BP(mean): --  RR: --  SpO2: 93% (15 Jul 2020 09:36) (92% - 96%) I&O's Summary    14 Jul 2020 07:01  -  15 Jul 2020 07:00  --------------------------------------------------------  IN: 0 mL / OUT: 350 mL / NET: -350 mL    GENERAL:  [ ]Alert  [ ]Oriented x   [x ]Lethargic  [ ]Cachexia  [ ]Unarousable  [ ]Verbal  [x ]Non-Verbal  Behavioral:   [ ] Anxiety  [ ] Delirium [ ] Agitation [ ] Other  HEENT:  [ ]Normal   [ ]Dry mouth   [x ]ET Tube/Trach   ( Extubation  today)  [ ]Oral lesions  PULMONARY:   [ ]Clear [ ]Tachypnea  [ ]Audible excessive secretions   [ ]Rhonchi        [ ]Right [ ]Left [ ]Bilateral  [ ]Crackles        [ ]Right [ ]Left [ ]Bilateral  [ ]Wheezing     [ ]Right [ ]Left [ ]Bilateral  [x ]Diminshed BS [ ]Right [ ]Left [ x]Bilateral    CARDIOVASCULAR:    [ ]Regular [ ]Irregular [x ]Tachy  [ ]Ubaldo [ ]Murmur [ ]Other  GASTROINTESTINAL:  [x ]Soft  [ ]Distended   [ x]+BS  [ x]Non tender [ ]Tender  [ ]PEG [ ]OGT/ NGT   Last BM:  7/13     GENITOURINARY:  [ ]Normal [ ] Incontinent   [ ]Oliguria/Anuria   [x ]Antoine  MUSCULOSKELETAL:   [ ]Normal   [ ]Weakness  [x ]Bed/Wheelchair bound [ ]Edema  NEUROLOGIC:   [ ]No focal deficits  [ x] Cognitive impairment  [ ] Dysphagia [ ]Dysarthria [ ] Paresis [ ]Other   SKIN:   [ ]Normal  [ ]Rash   [x] Rash  underarms b/l; impaired skin integrity perineum, sacral/gluteal  [ ]Pressure ulcer(s)  [ ]y [ ]n  Present on admission      CRITICAL CARE:  [ ] Shock Present  [ ]Septic [ ]Cardiogenic [ ]Neurologic [ ]Hypovolemic  [ ]  Vasopressors [ ]  Inotropes   [ x] Respiratory failure present [x ] Mechanical Ventilation [ ] Non-invasive ventilatory support [ ] High-Flow  [ x] Acute  [ ] Chronic [ ] Hypoxic  [ ] Hypercarbic [ ] Other  [ ] Other organ failure     LABS: None New      RADIOLOGY & ADDITIONAL STUDIES: None New    PROTEIN CALORIE MALNUTRITION: [ ] mild [ ] moderate [ ] severe  [ ] underweight [ ] morbid obesity    https://www.andeal.org/vault/2440/web/files/ONC/Table_Clinical%20Characteristics%20to%20Document%20Malnutrition-White%20JV%20et%20al%202012.pdf    Height (cm): 162.6 (05-30-20 @ 00:49), 152.4 (05-29-20 @ 19:18), 154.9 (05-12-20 @ 21:48)  Weight (kg): 99.6 (05-30-20 @ 00:49), 136.1 (05-29-20 @ 19:18), 98.2 (05-12-20 @ 23:57)  BMI (kg/m2): 37.7 (05-30-20 @ 00:49), 51.5 (05-30-20 @ 00:49), 58.6 (05-29-20 @ 19:18)    [x ] PPSV2 < or = 30% [ ] significant weight loss [ ] poor nutritional intake [ ] anasarca Albumin, Serum: 3.5 g/dL (07-01-20 @ 05:20)    Artificial Nutrition [ ]     REFERRALS:   [ ]Chaplaincy  [ ] Hospice  [ ]Child Life  [ ]Social Work  [ ]Case management [ ]Holistic Therapy [ ] Physical Therapy [ ] Dietary   Goals of Care Document: GISEL Guzman (07-10-20 @ 17:20)  Goals of Care Conversation:   Advance Directives:  · Caregiver:  information could not be obtained    Conversation Discussion:  · Conversation Details  PCU Care plan delineation      HPI:  63 yo F with  hypothyroidism, HTN, IDDM, COPD/CHRIS on home O2, RA (on Prednisone), SBO s/p ex lap with lysis of adhesions c/b evisceration  history of trach and peg in the past after a protracted intubation > 30 days (reversal) ,  recent hospitalization in 3/2020 with encephalopathy, significantly hypothyroid (, without myxedema coma), small (stable) right sided SDH, UTI, catatonic reaction to VPA (now resolved) and DKA and psychiatric issues, 5/2020 hospitalization for GIB, Emphysematous cystitis, endometritis, Sigmoid colitis, morbid obesity, functional quadriplegia, a/w AMS found to have ICH now intubated > 40 days with no purposeful movement    We discussed global aspects of the patient's care with a focus on review goals and values.  I discussed  different approaches to care: curative, restorative, or purely symptom focused.  The decision makers Heavenly and Norris were present and eager to discussion options pre and post extubation.  The family was interested in possible home hospice if she survives extubation.    I recommended a purely symptom focused approach including the following:  DNAR to allow for a natural death in the event of asystole or a fatal arrythmia  DNI once extubated  No routine surveillance with blood draws  No new fever work up (no imaging, no blood cultures, no care escalation, no new antibiotics)  No artificial nutrition once the feeding is stopped in the periextubation period  No gastrostomy  No hemodialysis  APAP to address fevers  A trial of antibiotics to only address symptoms (e.g. dysuria)  A trial of IV fluids to address symptoms (e.g. seizure/myoclonus to address opiate induced neurotoxicity)  Maintenance of seizure medications  Maintenance of tramadol and gabapentin for OA.    Regarding my recommendations, they agreed to all of them.  They agreed to a transfer to the PCU as soon as bed is available  They agreed to extubation on Wednesday  They want to maintain current care until the periextubation period but realize that some adjustments may need to occur in as part of the process of extubation (feeds discontinuation  to reduce aspiration risk)  Additionally we discussed the possibility of fluid overload which would mean deescalation of IVF  Visitation regulations discussed with the PCU team and family, consensus found  Discussed symptom management and the fact that medications do not hasten death.  Explained the non-residential component of the PCU and its role in symptom management.  I explained that we would need to ask IV opioids in the event that her tramadol is not helpful since OA was a major part of her symptom burden.  Post extubation prognosis of minutes to hours offered.  Explained that she may die prior to extubation.    RADHA placed in the chart with an incapacity form  Case discussed with the fellow on call    Electronic Signatures for Addendum Section:   Arsalan Guzman) (Signed Addendum 10-Jul-2020 17:45)    ACP >16 min  Araslan Guzman) (Signed Addendum 10-Jul-2020 18:36)    Family also agreed to no telemetry or continuous pulse ox.    Electronic Signatures:  Arsalan Guzman)  (Signed 10-Jul-2020 17:52)  	Authored: Goals of Care Conversation      Last Updated: 10-Jul-2020 18:36 by Arsalan Guzman)

## 2020-07-15 NOTE — PROGRESS NOTE ADULT - ATTENDING COMMENTS
I have personally seen and examined this patient and agree with the above assessment and plan, which I have reviewed and edited where appropriate.   Patient evaluated at bedside, triggers vent, neurologically impaired, obtunded.  Suspect poor airway tone and would premedication with ativan/dilaudid prior to extubation.  Chaplaincy called and multiple family members at bedside.  Family was  educated as to what to expect.  Questions answered.  Emotional support provided.   Patient  shortly afterwards.

## 2020-07-15 NOTE — PROGRESS NOTE ADULT - PROBLEM SELECTOR PLAN 6
Intubated > 40 days on mechanical ventilation with vent settings at 450/30/5/14  On 7/11/2020, CXR showed ET tube in right mainstem bronchus. ET tube adjustment by Respiratory therapy was done. Repeat chest xray showed ET tube w/ appropriate placement.  Plan is for compassionate extubation on today 7/15

## 2020-07-15 NOTE — DISCHARGE NOTE FOR THE EXPIRED PATIENT - SECONDARY DIAGNOSIS.
Acute respiratory failure Adult Hypothyroidism Encephalopathy due to structural disorder of brain Salivary secretion Uncontrolled type 2 diabetes mellitus with hyperglycemia Arthritis Seizures Functional quadriplegia COPD (chronic obstructive pulmonary disease)

## 2020-07-15 NOTE — PROGRESS NOTE ADULT - PROBLEM SELECTOR PLAN 3
2/2 to Hypoxic respiratory failure  Received Dilaudid 1 mg IVP x 1 overnight  C/W Dilaudid 0.5 mg IVP q 4 ATC and Dilaudid 1 mg IVP q 1 hour PRN for Dyspnea

## 2020-07-15 NOTE — PROGRESS NOTE ADULT - PROBLEM SELECTOR PROBLEM 1
ICH (intracerebral hemorrhage)
Metabolic encephalopathy
Metabolic encephalopathy
Seizures
Uncontrolled type 2 diabetes mellitus with hyperglycemia
Respiratory failure
Uncontrolled type 2 diabetes mellitus with hyperglycemia
Metabolic encephalopathy

## 2020-07-15 NOTE — DISCHARGE NOTE FOR THE EXPIRED PATIENT - HOSPITAL COURSE
HPI:   63 y/o F with AMS since this morning.  Pt. was confused, didn't recognize son this morning.  He notes she was sleeping more than usual yesterday, which tipped off son that something was wrong.  At 11 am pt. was gasping for air like she was seizing. 2 hours ago pt. was dry heaving, her chest was going up and down and she started shaking like she was having a seizure.  Pt. has no history of seizures per son.  PMH: hypothyroidism, HTN, IDDM, COPD/CHRIS on home O2, RA (on Prednisone), SBO s/p ex lap with lysis of adhesions c/b evisceration,  recent hospitalization in 3/2020 with encephalopathy, significantly hypothyroid (, without myxedema coma), small (stable) right sided SDH, UTI, catatonic reaction to VPA (now resolved) and DKA and psychiatric issues, 2020 hospitalization for GIB, Emphysematous cystitis, endometritis, Sigmoid colitis, morbid obesity, functional quadriplegia    HCP: Norris Rush, Son [phone]; Daughter, Heavenly Schmidt [phone]; son asks that only him or his sister, Heavenly be contacted for patient updates/information as they are both proxy's for patient      Patient transferred to the PCU on 7/10 after a prolonged intubation in the NSCU for > 40 days. Family meeting took place on 7/10 for which family agreed for patient to be transferred to the PCU for symptom management and plan for compassionate extubation on 7/15.  During the PCU stay patient remained on Mechanical ventilation: 450/30//, remained intubated, kept comfortable, symptoms management of Dyspnea, pain, seizures, anxiety/ Agitation for which she received Dilaudid 1 mg q 1 hour PRN for pain / Dyspnea and 0.5 mg q 4 Hours ATC, also received Ativan 1 mg q 1 Hour PRN for Anxiety/ Agitation Keppra/ Vimpat/ phenytoin and Ativan for seizures, Rubinol 0.4 mg IVP/ scopolamine patch for oral secretions Bowel regimen with Dulcolax and Synthroid for Hypothyroid.     patient was compassionately extubated today 7/15 at 12.15 Am , family at bedside, chaplaincy called, emotional support provided.  Patient  later  at 1. 02 pm

## 2020-07-15 NOTE — PROGRESS NOTE ADULT - ASSESSMENT
65 Y/O multiple medical issues as described above admitted with altered mental status  CTH with left frontal ICH/SAH/IVH. Palliative care consulted for goals of care. Pt transferred to PCU  for symptom management of left frontal ICH/SAH/IVH/ seizures/ Dyspnea.  Plan for compassionate extubation on Wednesday.    Compassionate extubation today at 11.30 Am. Family at bedside 65 Y/O multiple medical issues as described above admitted with altered mental status  CTH with left frontal ICH/SAH/IVH. Palliative care consulted for goals of care. Pt transferred to PCU  for symptom management of left frontal ICH/SAH/IVH/ seizures/ Dyspnea.  Plan for compassionate extubation on Wednesday.    Compassionate extubation today at 11.30 Am. Family at bedside. Patient received Dilaudid 1mg IVP x 2, Dilaudid 2mg IVP x1, Ativan 1 mg IVP x 2, Rubinol 0.4 mg IVP x 1

## 2020-07-15 NOTE — PROGRESS NOTE ADULT - REASON FOR ADMISSION
ICH, SAH w/ IVH

## 2020-07-15 NOTE — DISCHARGE NOTE FOR THE EXPIRED PATIENT - OTHER SIGNIFICANT FINDINGS
EXAM:  CT BRAIN                        PROCEDURE DATE:  05/29/2020          INTERPRETATION:  CLINICAL INFORMATION: Seizure and altered mental status x1 day.  COMPARISON: CT head of 2/20/2020.    PROCEDURE:   Noncontrast CT of the Head was performed from the skull base to the vertex. Coronal and Sagittal reformats were obtained.    FINDINGS:    Acute left frontal parenchymal hemorrhage measuring 5.5 x 3.4 x 4.0 cm. Subarachnoid hemorrhage within the suprasellar cistern and extending inferiorly along the prepontine and premedullary cisterns to the level of the foramen magnum. Additional subarachnoid hemorrhage within the left left sylvian and anterior interhemispheric fissure as well as the right cingulate sulcus. Intraventricular extension of hemorrhage seen predominantly within the left lateral, third, and fourth ventricles. No significant midline shift or hydrocephalus.    Extensive hypodensity within the bilateral frontoparietal occipital white matter, with a predominant parieto-occipital distribution.    Air-fluid level within the right sphenoid sinus. Remainder the visualized paranasal sinuses and mastoid air cells are clear. Status post bilateral ocular lens replacement. Mild bilateral proptosis.    Visualized osseous structures are intact.    IMPRESSION:     5.5 cm left frontal parenchymal, scattered predominantly midline and left-sided subarachnoid hemorrhage and intraventricular hemorrhage.    Extensive bilateral frontoparietal occipital subcortical white matter hypodensity, with a parieto-occipital predominance, which may represent edema of unclear etiology and new since prior examination of 2/20/2020.    Further evaluation with CT angiogram and contrast-enhanced brain MRI is recommended.

## 2020-07-15 NOTE — AIRWAY REMOVAL NOTE  ADULT & PEDS - ARTIFICAL AIRWAY REMOVAL COMMENTS
Written order for extubation verified. The patient was identified by full name and birth date compared to the identification band. Present during the procedure was  the attending.

## 2020-10-08 NOTE — ED ADULT NURSE NOTE - NSFALLRSKASSESASSIST_ED_ALL_ED
Detail Level: Detailed Additional Comments: Recommended patient wear daily compression stockings yes

## 2020-10-20 NOTE — H&P ADULT - HISTORY OF PRESENT ILLNESS
[Subsequent Evaluation] : a subsequent evaluation for [FreeTextEntry2] : Ear Fullness  63yof w/ HTN, DM2, COPD/CHRIS on nocturnal CPAP, discharged yesterday from [facility] after a prolonged hospitalization for SBO requiring lysis of adhesions, complicated by hypercapneic respiratory failure requiring intubation. This morning pt awoke with shortness of breath and chest tightness, was supposed to use CPAP last night but nursing facility did not have the machine. Pt oxygen dependent on 4L at baseline. Feeling better now with supplemental oxygen. No fevers or chills. Occasional cough.

## 2020-11-08 NOTE — PROGRESS NOTE ADULT - ATTENDING COMMENTS
[facility] Hospital Medicine  Progress Note    Patient Name: Katarina Rivera  MRN: [id number]  Patient Class: IP- Inpatient   Admission Date: 11/7/2020  Length of Stay: 1 days  Attending Physician: Adriana Quinteros*  Primary Care Provider: Ethan Denton MD        Subjective:     Principal Problem:GI bleed        HPI:  Ms. Rivera is an 87 yo female with a pertinent past medical history of ulcerative colitis (stable for several years), bilateral PE and DVT 1/2019 (on xarelto) who presented [facility] ED with bloody diarrhea. The patient states she has been seeing bright red blood in her stool for about a month but it got worse over the last few days. She endorses occasional SOB since the bleeding started. The patient reports she fell on Friday after her legs were weak and gave out. She is noted to have bruising to her forehead and face. No abdominal pain or vomiting. No chills, fevers, body aches. In the ED she was found to have bright red blood in her rectum. Her vitals were stable. Labs revealed Hb 5.7 and Hct 22. She received 2 units of PRBC. Her D Dimer was elevated at 4.55.  Stool studies were sent - rotavirus Ag negative; occult blood positive; stool WBC, O&P, giardia/crypto in process.  CT abdomen showed proctosigmoid ulcerative colitis up to transverse colon, did not appear to be a flare. The patient was transferred and admitted to [facility] hospital medicine service for GI services.       Overview/Hospital Course:  No notes on file    Interval History: awake and alert, denies any melena stool,   Patient and family questions and concern addressed  S/p 2 U prbc. H/H stable  awaits GI  evals and rec's     Review of Systems   Constitutional: Negative for appetite change, chills and fever.   Respiratory: Negative for cough and shortness of breath.    Cardiovascular: Negative for chest pain and palpitations.   Gastrointestinal: Negative for abdominal pain, blood in stool, diarrhea,  nausea and vomiting.   Genitourinary: Negative for difficulty urinating, dysuria and hematuria.   Musculoskeletal: Negative for back pain and gait problem.   Skin: Negative for rash and wound.   Neurological: Positive for weakness. Negative for dizziness, syncope, light-headedness and headaches.   Psychiatric/Behavioral: Negative for confusion.     Objective:     Vital Signs (Most Recent):  Temp: 97.5 °F (36.4 °C) (11/08/20 0818)  Pulse: 60 (11/08/20 0818)  Resp: 16 (11/08/20 0818)  BP: 111/68 (11/08/20 0818)  SpO2: 96 % (11/08/20 0818) Vital Signs (24h Range):  Temp:  [97.5 °F (36.4 °C)-99.7 °F (37.6 °C)] 97.5 °F (36.4 °C)  Pulse:  [57-96] 60  Resp:  [16-18] 16  SpO2:  [95 %-99 %] 96 %  BP: (111-146)/(54-78) 111/68     Weight: 56.7 kg (125 lb)  Body mass index is 22.14 kg/m².    Intake/Output Summary (Last 24 hours) at 11/8/2020 0909  Last data filed at 11/7/2020 2013  Gross per 24 hour   Intake 60 ml   Output --   Net 60 ml      Physical Exam  Constitutional:       General: She is not in acute distress.     Appearance: Normal appearance.   HENT:      Head: Normocephalic and atraumatic.   Neck:      Musculoskeletal: Neck supple.   Cardiovascular:      Rate and Rhythm: Normal rate and regular rhythm.      Pulses: Normal pulses.      Heart sounds: Murmur present.   Pulmonary:      Effort: Pulmonary effort is normal.      Breath sounds: Normal breath sounds.   Abdominal:      General: Bowel sounds are normal.      Palpations: Abdomen is soft.   Musculoskeletal: Normal range of motion.   Skin:     General: Skin is warm and dry.   Neurological:      Mental Status: She is alert and oriented to person, place, and time.      Motor: Tremor present. No weakness.   Psychiatric:         Mood and Affect: Mood normal.         Behavior: Behavior normal.         Significant Labs:   ABGs: No results for input(s): PH, PCO2, HCO3, POCSATURATED, BE, TOTALHB, COHB, METHB, O2HB, POCFIO2 in the last 48 hours.  Blood Culture:   Recent  62F PMH RA (on Orencia and chronic prednisone), COPD, pulmonary HTN, sleep apnea (not treated), HTN, hyperlipidemia, DM, TIA/CVA, hypothyroidism admitted in Sept 2017 for CAP and COPD exacerbation with right sided heart failure requiring diuresis. Presents for fever, productive cough with yellow sputum, SOB and chest congestion. Admitted for acute COPD exacerbation.    ---RENAL FAILIURE IS A CONCERN--  HOLD NAPROSYN  - RVP negative  - cont oxygen supplementation  - prednisone, duonebs, levaquin to complete 5 day course for COPD exacerbation  - CXR without definitive infiltrates, afebrile since admission  - sputum culture with normal respiratory jackelyn   - poorly controlled DM with hyperglycemia: started on lantus with premeal insulin coverage  - appreciate endocrine follow up: with severe arthritis pt may have difficulty with self administering insulin, will have education and see if pt able to self administer; if not will have to opt for oral regimen for DM control  - hypothyroid on labs, synthroid increased on 5/23 to 125mcg daily  - cont diuresis with lasix 40mg daily (home dose)  - pt would benefit from pulmonary rehabilitation upon discharge for her underlying chronic obstructive lung disease  - awaiting spirometry to be performed Labs   Lab 11/07/20  1126   LABBLOO No Growth to date  No Growth to date     CBC:   Recent Labs   Lab 11/07/20  1127 11/08/20  0558 11/08/20  0830   WBC 11.44 9.12 11.08   HGB 5.7* 8.4* 9.2*   HCT 22.0* 27.7* 31.3*   * 345 356*     CMP:   Recent Labs   Lab 11/07/20  1127      K 3.6      CO2 19*   GLU 87   BUN 21   CREATININE 1.3   CALCIUM 8.7   PROT 7.1   ALBUMIN 3.3*   BILITOT 0.5   ALKPHOS 82   AST 13   ALT <5*   ANIONGAP 13   EGFRNONAA 37*     Lactic Acid:   Recent Labs   Lab 11/07/20  1127   LACTATE 1.3     Lipase: No results for input(s): LIPASE in the last 48 hours.  Magnesium:   Recent Labs   Lab 11/07/20  1127   MG 2.1     Prealbumin: No results for input(s): PREALBUMIN in the last 48 hours.  Respiratory Culture: No results for input(s): GSRESP, RESPIRATORYC in the last 48 hours.  Troponin:   Recent Labs   Lab 11/07/20  1127   TROPONINI <0.006     TSH:   Recent Labs   Lab 11/07/20  1127   TSH 3.020     Urine Culture: No results for input(s): LABURIN in the last 48 hours.  Urine Studies: No results for input(s): COLORU, APPEARANCEUA, PHUR, SPECGRAV, PROTEINUA, GLUCUA, KETONESU, BILIRUBINUA, OCCULTUA, NITRITE, UROBILINOGEN, LEUKOCYTESUR, RBCUA, WBCUA, BACTERIA, SQUAMEPITHEL, HYALINECASTS in the last 48 hours.    Invalid input(s): WRIGHTSUR    Significant Imaging: I have reviewed all pertinent imaging results/findings within the past 24 hours.      Assessment/Plan:      * GI bleed  Bleeding for a month--Hgb 5.7 on arrival  On xarelto for hx PE, DVT  Hold xarelto   Got 2 units of PRBC   Monitor Hgb  Consult GI  protonix BID  NPO  Will likely need IVC filter      Weakness  Secondary to anemia  Patient is normally independent, still drives  Consult PT/OT      History of pulmonary embolism  History of DVT (deep vein thrombosis)    1/2019  Bilateral  Hospitalized here at the time      Tremor  Chronic, hereditary      Ulcerative colitis  Chronic--25 years  Has been stable for years  Cont  sulfasalazine  Consult GI - awaiting eval.        VTE Risk Mitigation (From admission, onward)         Ordered     IP VTE HIGH RISK PATIENT  Once      11/07/20 1832     Place sequential compression device  Until discontinued      11/07/20 1832                Discharge Planning   CHARLI:      Code Status: Full Code   Is the patient medically ready for discharge?:     Reason for patient still in hospital (select all that apply): Patient trending condition                     Adriana Quinteros MD  Department of Hospital Medicine   [facility]

## 2020-11-23 NOTE — PROGRESS NOTE ADULT - ASSESSMENT
Ms. Morrow is a 63 year old female with PMHx of HTN, DM, hypothyroidism, RA (prednisone 10mg), pulmonary HTN, CHRIS, and COPD (3L at home) who was originally admitted on 12/15 for SBO s/p ex lap with lysis of adhesions (4 retention sutures) c/b 12/30 she eviscerated 2/2 a coughing episode s/p ex lap (6 retention sutures) c/b multifocal pneumonia, developed hypercapnic respiratory failure requiring intubation 1/6, s/p bronch 1/9/18, and extubated on 1/24, course further c/b Enterococci bacteremia now readmitted to MICU for hypoxic respiratory failure 2/2 atelectasis requiring intubation 01/31    #Neuro  Sedated   - Continue propofol and fentanyl     #Respiratory   Hypoxic Respiratory Failure 2/2 atelectasis requiring intubation 1/31  Bronchoscopy showing findings concerning for hyperdynamic airway collapse.   Patient was deconditioned and unable to sit up and participate with PT leading to a functional shunt   - At this time, will continue ventilatory support     #Cardiovascular  Cardiogenic shock 2/2 iatrogenic   Prior bedside US showing LVOT   - On levophed, will wean off as tolerated   - Holding amlodipine 10mg, furosemide 40mg, and metoprolol 25mg BID    - Simvastatin 20mg     #GI  SBO s/p ex-lap with findings of healthy bowel c/b evisceration of bowel requiring placement of retention sutures  - NPO with tube feeds   - Continue Protonix    #Renal/Metabolic  - Monitor for electrolyte derangements     #ID  Enterococci bacteremia  - Vancomycin D23  Due to fever of unknown origin, antibiotics were broadened for gram negative coverage- Zosyn D6  Repeat infectious work up negative: UA, RVP  - Pending blood cultures- if all work up negative, will dc zosyn    #Endocrine  Diabetes Mellitus Type 2  - As per Endocrine team, will cover with NPH 7U q6hrs   - Gabapentin 400 TID held for now   Rheumatoid arthritis   - Continue prednisone 10mg   Hypothyroidism   - Levothyroxine 137     #DVT ppx  - Heparin SQ     # Ethics  GOC discussion ongoing with Palliative Care team. Son (HCP) would like to give the patient come time on the ventilatory before making any decisions regarding tracheostomy Ms. Morrow is a 63 year old female with PMHx of HTN, DM, hypothyroidism, RA (prednisone 10mg), pulmonary HTN, CHRIS, and COPD (3L at home) who was originally admitted on 12/15 for SBO s/p ex lap with lysis of adhesions (4 retention sutures) c/b 12/30 she eviscerated 2/2 a coughing episode s/p ex lap (6 retention sutures) c/b multifocal pneumonia, developed hypercapnic respiratory failure requiring intubation 1/6, s/p bronch 1/9/18, and extubated on 1/24, course further c/b Enterococci bacteremia now readmitted to MICU for hypoxic respiratory failure 2/2 atelectasis requiring intubation 01/31    #Neuro  Sedated   - Continue propofol and fentanyl     #Respiratory   Hypoxic Respiratory Failure 2/2 atelectasis requiring intubation 1/31  Bronchoscopy showing findings concerning for hyperdynamic airway collapse.   Patient was deconditioned and unable to sit up and participate with PT leading to a functional shunt   - At this time, will continue ventilatory support   - Will give lasix 40IV given B lines on bedside US     #Cardiovascular  Cardiogenic shock 2/2 iatrogenic   Prior bedside US showing LVOT   - On levophed, will wean off as tolerated   - Holding amlodipine 10mg, furosemide 40mg, and metoprolol 25mg BID    - Simvastatin 20mg     #GI  SBO s/p ex-lap with findings of healthy bowel c/b evisceration of bowel requiring placement of retention sutures  - NPO with tube feeds   - Continue Protonix    #Renal/Metabolic  - Monitor for electrolyte derangements     #ID  Enterococci bacteremia  - Vancomycin D23  - ID team will inform us of duration of abx  Due to fever of unknown origin, antibiotics were broadened for gram negative coverage- Zosyn D6  Repeat infectious work up negative: UA, RVP  - Pending blood cultures- if all work up negative, will dc zosyn  - C diff pending as per ID recs     #Endocrine  Diabetes Mellitus Type 2  - As per Endocrine team, will cover with NPH 7U q6hrs   - Gabapentin 400 TID held for now   Rheumatoid arthritis   - Continue prednisone 10mg   Hypothyroidism   - Levothyroxine 137     #DVT ppx  - Heparin SQ     # Ethics  GOC discussion ongoing with Palliative Care team. Son (HCP) would like to give the patient come time on the ventilatory before making any decisions regarding tracheostomy Sarecycline Counseling: Patient advised regarding possible photosensitivity and discoloration of the teeth, skin, lips, tongue and gums.  Patient instructed to avoid sunlight, if possible.  When exposed to sunlight, patients should wear protective clothing, sunglasses, and sunscreen.  The patient was instructed to call the office immediately if the following severe adverse effects occur:  hearing changes, easy bruising/bleeding, severe headache, or vision changes.  The patient verbalized understanding of the proper use and possible adverse effects of sarecycline.  All of the patient's questions and concerns were addressed.

## 2020-12-08 NOTE — PROGRESS NOTE ADULT - PROBLEM SELECTOR PROBLEM 6
Patient tolerated injection well, immunization record provided.  Instructed to remain in lobby for 15 minutes and to report any adverse reactions right away.  Verbalized understanding.     Gram positive bacterial infection

## 2020-12-16 NOTE — CONSULT NOTE ADULT - COMMENTS
HPI ROS PE NOTE          History of Present Illness   Chief complaint; follow-up carcinoma of the prostate status post radiation therapy 2016 specimens [place] 6 on positive biopsy in May 2016  Arlin Rivers is a 82 y.o. male who presents with follow-up for carcinoma of the prostate, most recent PSA test is undetectable, patient takes finasteride and tamsulosin for bladder neck obstruction. .  Bladder outlet obstruction is moderate in degree with international prostate symptom score of 17, nocturia 3 times a night, frequency more than half the time, urgency half the time, incomplete emptying intermittency and weak stream less than half the time and straining to urinate less than 1 time in 5 voids. Patient appears to have a disproportionate amount of irritative symptoms for fairly stable situation with his carcinoma. Past Medical History:   Diagnosis Date    Cancer [facility])     prostate    Diabetes ([facility] [address]     borderline    GERD (gastroesophageal reflux disease)     Hypercholesterolemia     Hypertension       Past Surgical History:   Procedure Laterality Date    HX CATARACT REMOVAL Bilateral     HX COLONOSCOPY      polyps removed     HX PROSTATE SURGERY      needle biopsy      Family History   Problem Relation Age of Onset    Hypertension Mother     Cancer Sister     Cancer Brother       Social History     Tobacco Use    Smoking status: Never Smoker    Smokeless tobacco: Never Used   Substance Use Topics    Alcohol use: Not Currently       Prior to Admission medications    Medication Sig Start Date End Date Taking? Authorizing Provider   doxycycline (VIBRAMYCIN) 100 mg capsule Take 1 Cap by mouth two (2) times a day for 30 days. Indications: prostatitis 12/15/20 1/14/21 Yes Sebastian Reaves MD   amLODIPine-Valsartan-HCTZ -25 mg tab Take  by mouth. Yes Provider, Historical   aspirin 81 mg chewable tablet Take 81 mg by mouth daily.    Yes Provider, Historical   lovastatin (MEVACOR) 40 mg tablet Take 40 mg by mouth nightly. Yes Provider, Historical   MULTIVITAMIN PO Take  by mouth. Yes Provider, Historical   tamsulosin (FLOMAX) 0.4 mg capsule Take 0.4 mg by mouth daily. Yes Provider, Historical   finasteride (PROSCAR) 5 mg tablet Take 5 mg by mouth daily. Yes Provider, Historical     No Known Allergies     Review of Systems:  Constitutional: negative  Respiratory: negative  Cardiovascular: negative  Gastrointestinal: positive for constipation  Genitourinary:positive for frequency, nocturia, hesitancy, decreased stream and Incomplete bladder emptying, urgency, intermittency, straining to urinate  Musculoskeletal:negative  Neurological: negative  Behavioral/Psychiatric: negative     Physical Exam     Physical Exam:   Visit Vitals  BP (!) 159/80 (BP 1 Location: Left arm, BP Patient Position: Sitting)   Temp (!) 96.7 °F (35.9 °C) (Temporal)   Ht 4' 11\" (1.499 m)   Wt 120 lb (54.4 kg)   BMI 24.24 kg/m²     General appearance: alert, cooperative, no distress, appears stated age  Head: Normocephalic, without obvious abnormality, atraumatic  Back: symmetric, no curvature. ROM normal. No CVA tenderness. Lungs: clear to auscultation bilaterally  Chest wall: no tenderness  Heart: regular rate and rhythm, S1, S2 normal, no murmur, click, rub or gallop  Abdomen: soft, non-tender.  Bowel sounds normal. No masses,  no organomegaly  Male genitalia: normal  Rectal: Prostate 30 g and benign  Extremities: extremities normal, atraumatic, no cyanosis or edema  Pulses: 2+ and symmetric  Skin: Skin color, texture, turgor normal. No rashes or lesions    Data Review:   Recent Results (from the past 48 hour(s))   AMB POC URINALYSIS DIP STICK AUTO W/O MICRO    Collection Time: 12/15/20  1:38 PM   Result Value Ref Range    Color (UA POC) Yellow     Clarity (UA POC) Clear     Glucose (UA POC) Negative Negative    Bilirubin (UA POC) Negative Negative    Ketones (UA POC) Negative Negative    Specific gravity (UA POC) 1.020 1.001 - 1.035    Blood (UA POC) Negative Negative    pH (UA POC) 6.5 4.6 - 8.0    Protein (UA POC) Negative Negative    Urobilinogen (UA POC) 0.2 mg/dL 0.2 - 1    Nitrites (UA POC) Negative Negative    Leukocyte esterase (UA POC) Negative Negative     No results for input(s): 48 in the last 72 hours. Assessment and Plan:   Carcinoma of the prostate [place] 6 in 2 specimens low risk for recurrence status post radiation therapy; continue tamsulosin and finasteride for bladder outlet obstruction  Significant symptoms of irritation with frequency nocturia and urgency with some urge incontinence suggestive of possible prostatitis in addition to bladder outlet obstruction. We will treat therefore with doxycycline 100 mg twice daily for 1 month with a follow-up visit in 6 weeks to assess its effectiveness. Hopefully this will improve the patient's voiding pattern significantly; discussion with patient about the nature of inflammation of the prostate along with his other conditions to pursue antibiotic therapy and treatment, extended visit 25 minutes with counseling and discussion of treatment plan along with face-to-face physical examination and review of records      Mr. Vickie Plaza has a reminder for a \"due or due soon\" health maintenance. I have asked that he contact his primary care provider for follow-up on this health maintenance. Twin Bermeo M.D.  12/15/2020 [  ] All other systems negative aside from above.  [X] ROS unobtainable because: Cannot provide history, poorly verbal

## 2021-01-15 NOTE — DIETITIAN INITIAL EVALUATION ADULT. - NUTRITION INTERVENTION
Apple juice given, pt playing appropriately     Jennifer Martinez, JEROD  01/14/21 2100 Nutrition Education/[employer]

## 2021-06-25 NOTE — CONSULT NOTE ADULT - PROBLEM SELECTOR PROBLEM 6
Esophagogastroduodenoscopy and Colonoscopy Procedure Notes    Patient Name:  Tamara Huang    Date of Procedure:  6/25/2021    Surgeon:  Barney Walters MD    Primary Care Provider:  Bradley Vaughn MD    Operative Procedure:  EGD - Esophagogastroduodenoscopy; Colonoscopy    Preoperative Diagnosis:  Dysphagia, GERD and Family Hx Esophageal cancer; Screening CRC    Postoperative Diagnosis:  Grade A Erosive Esophagitis; Antral Erosions; NO PUD; 6 Polyps resected     Anesthesia Medications Administered:  as per Anesthesia  Lidocaine 4% 15 mL gargle was not administered.     Procedure events  Event Tracking     Panel 1     Procedure : COLONOSCOPY      Event Time In    Procedure Start 1340    Procedure End 1410       Procedure : EGD      Event Time In    Procedure Start 1333    Procedure End 1339           Scope In: 1340   At Cecum: 1346  Scope Out: 1410  Scope Withdrawal Time: 24       EGD:  The patient was placed in the left lateral position and monitored continuously with automatic blood pressure, ECG tracing, pulse oximetry monitoring and direct observations.  Bite block placed and oxygen administered by a nasal cannula as needed.  Medications were administered incrementally over the course of the procedure to achieve an adequate level of conscious sedation. After adequate sedation, the EGD: GIF-H190 endoscope was carefully introduced into the oropharynx and passed in to the esophagus.  There was normal pharyngeal and laryngeal structure.    Esophagus:   The Z-line was measured at 38 cm from the incisors.  Grade A erosive esophagitis, stricture, H. Hernia.  S/p bxs.    Stomach:  Gastric lumen was easily distensible, with normal caliber folds.  Few antral erosions s/p bxs; no ulcer. Retroflexion did not reveal any other lesion(s) in cardia, angularis, or fundus.  Pyloric channel was normal.    Duodenum/Small Bowel:   Normal appearing mucosa in bulb, D2/D3 folds.      COLONOSCOPY:  The Olympus Colonoscope: [id number]     was inserted into the rectum and advanced under direct vision to the cecum, which was identified by IC valve and appendiceal orifice. The procedure was considered not difficult..      During withdrawal examination, the final quality of the prep was Chelsea bowel prep scale.  Right colon:  2- (minor amount of residual staining, small fragments of stool, and\or opaque liquid, but mucosa of colon segment is seen well)  Transverse colon:  2- (minor amount of residual staining, small fragments of stool, and\or opaque liquid, but mucosa of colon segment is seen well)  Left:  2- (minor amount of residual staining, small fragments of stool, and\or opaque liquid, but mucosa of colon segment is seen well)    A careful inspection was made as the colonoscope was withdrawn, including a retroflexed view of the rectum, findings and interventions are described below. Appropriate photo documentation was obtained.    Overall Tamara Huang tolerated the procedure well, without undue discomfort, hypotension or desaturation.  The patient was adequately recovered in the endoscopy suite and was transported to PACU.      Findings:  Anorectal:  Normal and Internal hemorrhoids   Cecum:  Normal mucosa  Ascending colon:  Normal mucosa and Polyp(s) 3mm x 3  Transverse colon:  Normal mucosa and Polyp(s) 3mm x 2; semi-pedunculated 8mm polyp hot snared clip placed  Descending colon:  Normal mucosa  Sigmoid colon:  Normal mucosa  Rectum:  Normal mucosa    Interventions:   5 Polyp(s) removed by cold biopsy, 1 Polyp(s) removed by hot snare polypectomy and Biopsy(single or multiple)    Blood loss:  < 10ml    Complications:  0    Impression:  As above    Recommendation:  Await pathology results; repeat colonoscopy in 3 years  F/u clinic   ACP (advance care planning)

## 2021-07-17 NOTE — CONSULT NOTE ADULT - SUBJECTIVE AND OBJECTIVE BOX
63 F with PMH of COPD (on home O2 (3L), hypothyroidism, T2DM (HbA1c 8s 11/2018, on insulin), RA on prednisone (10 mg daily x 30 years), HTN,  admitted for SBO s/p ex lap with lysis of adhesions, had been intubated for hypercapnic respiratory failure, now extubated     Daughter Heavenly whom patient has chosen with Dhruv as alternate for health care proxy has arrived    Family meeting patientHeavenly and Norris with Ethics present. Conversation centered around next steps. Patient awake, alert on high flow nasal cannula 60% at start with O2 sat 93%    Patient with appetite returned; desires "steak". patient with fatigue, has not been assisted out of bed as of yet. Physical therapy with range of motion only. Norris describes inconsistencies regarding PT regimen    Discussed with patient desires regarding future care and patient closed eyes when Medical team NP Monique Chino MD [place]    Patient aware of prognosis and desires not to discuss advanced directives regarding limitations. Desires all aggressive care necessary to restore her to prior function. Aware that had she received tracheostomy long term skilled facility would have been necessary. Team decreased O2 sat during vist and patient maintained )2 sat 90%    Goal to continue to decrease O2 requirements My signature below certifies that the above stated patient is homebound and upon completion of the Face-To-Face encounter, has the need for intermittent skilled nursing, physical therapy and/or speech or occupational therapy services in their home for their current diagnosis as outlined in their initial plan of care. These services will continue to be monitored by myself or another physician.

## 2021-07-27 NOTE — PATIENT PROFILE ADULT. - PURPOSEFUL PROACTIVE ROUNDING
7/27/2021         RE: Nir Guy  [address] [place] [id number]        Dear Colleague,    Thank you for referring your patient, Nir Guy, to the [facility]. Please see a copy of my visit note below.    Nir Guy is an extremely pleasant 84 year old year old male patient here today for spot on left temple.   .   Patient states this has been present for a while.  Patient reports the following symptoms:  none.  Patient reports the following previous treatments none.  These treatments did not work.  Patient reports the following modifying factors none.  Associated symptoms: none.  Patient has no other skin complaints today.  Remainder of the HPI, Meds, PMH, Allergies, FH, and SH was reviewed in chart.      Past Medical History:   Diagnosis Date     Actinic keratosis      AMD (age related macular degeneration)      Basal cell carcinoma      Former smoker      Full code status 1/6/2017     Hyperlipidemia      OA (osteoarthritis)      Osteoarthritis of left patellofemoral joint 1/6/2017     Osteoarthritis of shoulder      RBBB (right bundle branch block)      S/P colonoscopy 4/18/11     SNHL (sensorineural hearing loss)      Squamous cell carcinoma      Squamous cell carcinoma of skin      Tinnitus      Vertigo        Past Surgical History:   Procedure Laterality Date     BASAL CELL CARCINOMA EXCISION  1999    Nose and scalp     C TOTAL HIP ARTHROPLASTY Right 02/21/2018    Procedure:  RIGHT TOTAL HIP ARTHROPLASTY DIRECT ANTERIOR APPROACH;  Surgeon: Kaushik Hood MD;  Location: [facility];  Service: Orthopedics     CATARACT EXTRACTION, BILATERAL  2018     CATARACT IOL, RT/LT       IR LUMBAR EPIDURAL STEROID INJECTION  1/9/2018     RELEASE CARPAL TUNNEL Right 1/4/2021    Procedure: Revision open carpal tunnel release.;  Surgeon: Nasir Casper MD;  [facility]     RELEASE CARPAL TUNNEL Right      RELEASE CARPAL TUNNEL Right 01/2021     REVISION     VASECTOMY  1980's        Family History   Problem Relation Age of Onset     Melanoma No family hx of      Cancer Mother         GYN     Cancer Father         Lung     Heart Disease Father        Social History     Socioeconomic History     Marital status:      Spouse name: Not on file     Number of children: Not on file     Years of education: Not on file     Highest education level: Not on file   Occupational History     Employer: RETIRED   Tobacco Use     Smoking status: Never Smoker     Smokeless tobacco: Never Used   Substance and Sexual Activity     Alcohol use: Yes     Alcohol/week: 8.0 standard drinks     Comment: Alcoholic Drinks/day: nightly     Drug use: No     Sexual activity: Not on file   Other Topics Concern     Not on file   Social History Narrative     Not on file     Social Determinants of Health     Financial Resource Strain:      Difficulty of Paying Living Expenses:    Food Insecurity:      Worried About Running Out of Food in the Last Year:      Ran Out of Food in the Last Year:    Transportation Needs:      Lack of Transportation (Medical):      Lack of Transportation (Non-Medical):    Physical Activity:      Days of Exercise per Week:      Minutes of Exercise per Session:    Stress:      Feeling of Stress :    Social Connections:      Frequency of Communication with Friends and Family:      Frequency of Social Gatherings with Friends and Family:      Attends Protestant Services:      Active Member of Clubs or Organizations:      Attends Club or Organization Meetings:      Marital Status:    Intimate Partner Violence:      Fear of Current or Ex-Partner:      Emotionally Abused:      Physically Abused:      Sexually Abused:        Outpatient Encounter Medications as of 7/27/2021   Medication Sig Dispense Refill     fish oil-omega-3 fatty acids 1000 MG capsule Take 2 g by mouth daily       hypromellose-dextran (GENTEAL TEARS) 0.1-0.3 % ophthalmic solution Place 1 drop into  both eyes daily as needed for dry eyes        MECLIZINE HCL PO Take 25 mg by mouth        Multiple Vitamins-Minerals (MENS MULTIVITAMIN PLUS PO)        Multiple Vitamins-Minerals (PRESERVISION AREDS PO) Take by mouth daily       polyethylene glycol-propylene glycol PF (SYSTANE HYDRATION PF) 0.4-0.3 % SOLN opthalmic solution Apply 1 drop to eye       No facility-administered encounter medications on file as of 7/27/2021.             O:   NAD, WDWN, Alert & Oriented, Mood & Affect wnl, Vitals stable   Here today alone   Pulse 77   Ht 1.829 m (6')   SpO2 96%   BMI 27.80 kg/m     General appearance normal   Vitals stable   Alert, oriented and in no acute distress      Following lymph nodes palpated: Occipital, Cervical, Supraclavicular no lad   L temple stuck on papules      Stuck on papules and brown macules on trunk and ext   Red papules on trunk  Flesh colored papules on trunk     The remainder of the full exam was normal; the following areas were examined:  conjunctiva/lids, oral mucosa, neck, peripheral vascular system, abdomen, lymph nodes, digits/nails, eccrine and apocrine glands, scalp/hair, face, neck, chest, abdomen, buttocks, back, RUE, LUE, RLE, LLE       Eyes: Conjunctivae/lids:Normal     ENT: Lips, buccal mucosa, tongue: normal    MSK:Normal    Cardiovascular: peripheral edema none    Pulm: Breathing Normal    Lymph Nodes: No Head and Neck Lymphadenopathy     Neuro/Psych: Orientation:Alert and Orientedx3 ; Mood/Affect:normal       A/P:  1. Seborrheic keratosis, lentigo, angioma, dermal nevus, hx of non-melanoma skin cancer   It was a pleasure speaking to Nir Guy today.  Previous clinic notes and pertinent laboratory tests were reviewed prior to Nir Guy's visit.  Signs and Symptoms of skin cancer discussed with patient.  Patient encouraged to perform monthly skin exams.  UV precautions reviewed with patient.  Risks of non-melanoma skin cancer discussed with patient   Return to clinic 12  months        Again, thank you for allowing me to participate in the care of your patient.        Sincerely,        Chavez Jay MD     Patient

## 2021-09-22 NOTE — PROGRESS NOTE ADULT - RS GEN PE MLT RESP DETAILS PC
Addended by: CHAO LUCERO on: 9/22/2021 04:17 PM     Modules accepted: Orders    
respirations non-labored/clear to auscultation bilaterally

## 2021-10-19 NOTE — PHYSICAL THERAPY INITIAL EVALUATION ADULT - LEVEL OF CONSCIOUSNESS, REHAB EVAL
Attempted to reach father at number listed but unable to do so. Voicemail box is not set up either so could not leave a voicemail. Clzby results are in and I would like to switch Yon from Latuda to Vraylar for mood stabilization. Will await call back from father at this time.  
Message to provider:  Father called regarding getting results for the Genetic testing that was done on the patient. He states that the patient has been really acting out, and giving the group home a hard time. He would like a nurse to give him a call back.    [] Writer advised caller of callback from clinic within 24-72 hours  
Received phone call back from father. Discussed Genesight results. Plan will be to cross-taper patient from Latuda to Vraylar. Instructed dad to lower Latuda to 20mg for 1 week and then stop while starting Vraylar 1.5mg daily. After 1 week, only continue the Vraylar. Father verbalized understanding. Medication sent to pharmacy on file.  
Returned call to Pt father. States Pt is \"acting out more towards me and at school.\" States Pt is now \"cussing us out whenever he talks to us.\" Father says he feels medications are non-effective for Pt. Says he watches Pt take all medications \"but its like it does nothing for him. His Bx is just getting worse.\" Pt last seen 10/7/21 and had Genesight testing completed. Pt is also asking when he may expect those results. Routing to provider.  
alert

## 2021-12-13 NOTE — DISCHARGE NOTE PROVIDER - NSCORESITESY/N_GEN_A_CORE_RD
-- DO NOT REPLY / DO NOT REPLY ALL --  -- Message is from the [facility]--    General Patient Message      Reason for Call: Patient needs to cancel appointment because another appointment came urgent please call patient back to reschedule appointment for 12/15/21    Caller Information       Type Contact Phone    12/12/2021 05:02 PM CST Phone (Incoming) Ale Wolfe (Self) [phone] (H)          Alternative phone number: na        Did the caller agree that this message can wait until the office reopens on Monday (or after the holiday)? YES - The Message Can Wait      Send a message to the provider's clinical support pool.     Turnaround time given to caller:   \"This message will be sent to [state Provider's name]. The clinical team will fulfill your request as soon as they review your message when the office opens on Monday (or after the holiday).\"      
Patient is trying to get her appointment moved up from Dec 22nd to Dec 15th as she has a conflict with the other appointment. Patient asking for someone to get back with her asap at [phone].   
Returned patient call. Offered patient 1:00 p.m. for 12/15. Patient stated it is difficult because her transportation is providing a ride for another patient at a different time/location on 12/22. Patient decided to keep appt on 12/22 as the other appt time offered was not convenient.   
Returned patient call. Patient decided to keep appt on 12/22.   
No

## 2022-02-26 NOTE — OCCUPATIONAL THERAPY INITIAL EVALUATION ADULT - 2-POINT DISCRIMINATION, LLE, OT EVAL
bland diet  Rest  Increase liquids  Tylenol if needed for pain  Follow-up with your gastroenterologist within normal limits

## 2022-03-24 NOTE — PROGRESS NOTE ADULT - PROBLEM SELECTOR PLAN 2
Fasting labs ordered     Stop Vitamin K for now    Follow up in 2-4 weeks, sooner if symptoms worsen or persist     [facility] Laboratory Locations - No appointment necessary. @ indicates the location is open Saturdays in addition to Monday through Friday. Call your preferred location for test preparation, business hours and other information you need. [employer] accepts [employer]. [facility]    @ [place] [facility] [address]   Ph: [phone] [facility] Lab Svcs. [address]   Ph: [phone]  @ [facility] Sv[facility] [address] [facility]   Ph: [phone]    [facility] S[facility]. [address]   Ph: [phone] @ [place] [facility] [address]  Ph: [phone] @ [facility] Lab Svcs. [address] [facility]. [address] [facility] [address]   Ph: [phone]    [place]   @ [place] [facility] [address] [place], [place] [address]   Ph: [phone] [place] [facility]. [address] [place] [address]  Ph: [address] [place] [address]   [facility]:  [address] Lab Svcs[address] [facility]   Ph: [phone] [facility]. Lab Svcs.   [address] [facility], [address] [place] [address]   Ph: [phone] -Pt currently on PO Synthroid but team will order IV synthroid today and start  mcg tomorrow since pt didn't get it this am.   -Make sure med is given in an empty stomach  -Re test  TFT in 4-6 weeks.

## 2022-04-29 NOTE — H&P ADULT - NSHPSOCIALHISTORY_GEN_ALL_CORE
24hr HHA, lives w/ adult son who is primary caretaker and HCP w/ Adult daughter. 4 = No assist / stand by assistance

## 2022-05-12 NOTE — H&P ADULT - NSICDXFAMHXPERTINENTNEGATIVE_GEN_A_CORE_FT
N/A Island Pedicle Flap-Requiring Vessel Identification Text: The defect edges were debeveled with a #15 scalpel blade.  Given the location of the defect, shape of the defect and the proximity to free margins an island pedicle advancement flap was deemed most appropriate.  Using a sterile surgical marker, an appropriate advancement flap was drawn, based on the axial vessel mentioned above, incorporating the defect, outlining the appropriate donor tissue and placing the expected incisions within the relaxed skin tension lines where possible.    The area thus outlined was incised deep to adipose tissue with a #15 scalpel blade.  The skin margins were undermined to an appropriate distance in all directions around the primary defect and laterally outward around the island pedicle utilizing iris scissors.  There was minimal undermining beneath the pedicle flap.

## 2022-06-17 NOTE — PROGRESS NOTE ADULT - ATTENDING COMMENTS
No change in status. awake and alert  HFNC day, alternating with nocturnal bipap  pain management  refuses to consider getting out of bed.  prognosis poor.  will schedule another meeting with pt, son, as well as palliative care and ethics no

## 2022-08-14 NOTE — PROGRESS NOTE ADULT - ASSESSMENT
63 F with PMH of COPD (on home O2 (3L), hypothyroidism, T2DM (HbA1c 8s 11/2018, on insulin), RA on prednisone (10 mg daily x 30 years), HTN,  admitted for SBO s/p ex lap with lysis of adhesions, had been intubated for hypercapnic respiratory failure. No

## 2022-08-17 NOTE — DIETITIAN INITIAL EVALUATION ADULT. - SIGNS/SYMPTOMS
"Shannon Franco presented for Medicare AWV today. The following components were reviewed and updated:    · Medical history  · Family History  · Social history  · Allergies and Current Medications  · Health Risk Assessment  · Health Maintenance  · Care Team    **See Completed Assessments for Annual Wellness visit with in the encounter summary    The following assessments were completed:  · Depression Screening  · Cognitive function Screening        · Timed Get Up Test  · Whisper Test    Vitals:    08/17/22 0855   BP: (!) 143/87   Pulse: 75   Temp: 97.5 °F (36.4 °C)   TempSrc: Temporal   SpO2: 97%   Weight: 96.2 kg (212 lb)   Height: 5' 10.5" (1.791 m)     Body mass index is 29.99 kg/m².   ]    Physical Exam  Vitals reviewed.   Constitutional:       Appearance: Normal appearance.   HENT:      Head: Normocephalic and atraumatic.   Cardiovascular:      Rate and Rhythm: Normal rate and regular rhythm.      Pulses: Normal pulses.      Heart sounds: Normal heart sounds.   Pulmonary:      Effort: Pulmonary effort is normal.      Breath sounds: Normal breath sounds.   Musculoskeletal:         General: Normal range of motion.      Cervical back: Normal range of motion and neck supple.   Skin:     General: Skin is warm and dry.   Neurological:      Mental Status: She is alert and oriented to person, place, and time.   Psychiatric:         Mood and Affect: Mood normal.         Behavior: Behavior normal.          Outpatient Medications Marked as Taking for the 8/17/22 encounter (Office Visit) with LUZ MARIA Quispe   Medication Sig Dispense Refill    amLODIPine (NORVASC) 5 MG tablet Take 1 tablet (5 mg total) by mouth once daily. 30 tablet 11    apixaban (ELIQUIS) 5 mg Tab Take 1 tablet (5 mg total) by mouth 2 (two) times daily. 180 tablet 3    CALCIUM CARBONATE/VITAMIN D3 (CALCIUM 500 + D, D3, ORAL) Take 1 tablet by mouth once daily.       clobetasoL (TEMOVATE) 0.05 % cream Apply topically 2 (two) times daily. 60 g 1    " hydrocortisone 2.5 % cream Apply topically 2 (two) times daily. 20 g 0    lovastatin (MEVACOR) 40 MG tablet Take 1 tablet (40 mg total) by mouth nightly. 90 tablet 3    metoprolol succinate (TOPROL-XL) 100 MG 24 hr tablet TAKE 1 TABLET (100 MG TOTAL) BY MOUTH ONCE DAILY. 90 tablet 3    niacin (SLO-NIACIN) 500 mg tablet Take 500 mg by mouth 2 (two) times daily with meals.      psyllium 0.52 gram capsule Take 0.52 g by mouth once daily.      tretinoin (RETIN-A) 0.1 % cream Apply topically every evening. 45 g 11        Diagnoses and health risks identified today and associated recommendations/orders:  1. Encounter for preventive health examination  Medicare awv complete. Health maintenance:  Tetanus vaccine, shingles vaccines, and covid 19 boosters due-encouraged patient to obtain.     2. Ectatic aorta  Chronic and stable on current management. On lovastatin. See med list above. Follow up with PCP.      3. Bilateral carotid artery disease, unspecified type  Chronic and stable on current management. On lovastatin. See med list above. Follow up with PCP.      4. Sinoatrial node dysfunction  Pacemaker in place. No signs or symptoms of issues. F/u with cardiology Dr. Presley.     5. Stage 3a chronic kidney disease   Latest Reference Range & Units 09/28/20 08:50 07/15/21 12:06 09/08/21 08:14 02/28/22 08:57   eGFR if African American >60 mL/min/1.73 m^2 59.4 ! 52.5 ! 59 ! 58.5 !   !: Data is abnormal  Chronic and stable. No acute issues. Continue current management. Recommend avoid nsaids and other nephrotoxic medications. Follow up with PCP/nephrologist.      6. Essential hypertension  Chronic and stable on current management. On norvasc and toprol. See med list above. Recommend low sodium diet. Patient states she is waiting on bp machine. Previously ordered through [employer]. Patient never received it.  Ordered one today through "[employer]. Follow up with PCP.     - blood pressure test kit-medium Kit; 1 application by  Misc.(Non-Drug; Combo Route) route 2 (two) times a day.  Dispense: 1 each; Refill: 0    7. Mixed hyperlipidemia  Lab Results   Component Value Date    CHOL 165 02/28/2022    CHOL 157 01/03/2020    CHOL 137 07/12/2018     Lab Results   Component Value Date    HDL 41 02/28/2022    HDL 45 01/03/2020    HDL 41 07/12/2018     Lab Results   Component Value Date    LDLCALC 100.8 02/28/2022    LDLCALC 91.6 01/03/2020    LDLCALC 75.6 07/12/2018     Lab Results   Component Value Date    TRIG 116 02/28/2022    TRIG 102 01/03/2020    TRIG 102 07/12/2018     Lab Results   Component Value Date    CHOLHDL 24.8 02/28/2022    CHOLHDL 28.7 01/03/2020    CHOLHDL 29.9 07/12/2018    Chronic and stable on current management. On lovastatin. See med list above. Follow up with PCP.      8. Pacemaker  Chronic and stable. No acute issues. Continue current management. Follow up with cardiology.      9. Tricuspid valve insufficiency, unspecified etiology  Chronic and stable. No acute issues. Continue current management. See med list above. Follow up with cardiology.      10. Prediabetes   Latest Reference Range & Units 02/28/22 08:57   Hemoglobin A1C External 4.0 - 5.6 % 5.9 (H)   Estimated Avg Glucose 68 - 131 mg/dL 123   (H): Data is abnormally high  Controlled. Reviewed diabetic diet and preferred HgbA1C levels. Follow up with your PCP as instructed  ophthalmology yearly.      11. Obesity (BMI 30.0-34.9)  This problem is currently not controlled. Instructed patient to diet and exercise to lose weight. Follow up with your PCP as planned to discuss adjustments to your treatment plan.      12. Hx of colonic polyps  Last colonoscopy 3/9/2022. Next colonoscopy in 2027 if applicable for age.         Provided Shannon with a 5-10 year written screening schedule and personal prevention plan. Recommendations were developed using the USPSTF age appropriate recommendations. Education, counseling, and referrals were provided as needed.  After Visit Summary  printed and given to patient which includes a list of additional screenings\tests needed.    Follow up in about 1 year (around 8/17/2023) for annual wellness visit.      Beatriz Manley, LUZ MARIA    I offered to discuss advanced care planning, including how to pick a person who would make decisions for you if you were unable to make them for yourself, called a health care power of , and what kind of decisions you might make such as use of life sustaining treatments such as ventilators and tube feeding when faced with a life limiting illness recorded on a living will that they will need to know. (How you want to be cared for as you near the end of your natural life)     X Patient is interested in learning more about how to make advanced directives.  I provided them paperwork and offered to discuss this with them.   HgA1c of 9.0%, fingerstick values >180

## 2022-09-05 NOTE — PATIENT PROFILE ADULT - DISASTER - HCP AGENT'S NAME
Post-Partum Discharge Instructions      Self-Care  Diet  Drink at least 8 glasses of water a day.   Eat foods high in fiber, raw vegetables, fruit, and whole wheat.  Choose foods high in iron.  Continue to take prenatal vitamin until your 6 week visit or while breastfeeding.    Bowel and bladder function  Bowel function usually occurs 2-3 days after birth  Normal urination patterns should resume by one week.  Cleanse area after urination and pat dry.  If experiencing hemorrhoids use Tucks pads, ointments and warm water soaks  Your doctor may recommend a stool softener.    Bleeding  Vaginal discharge usually lasts 3-6 weeks after birth  First 2-3 days will be moderately heavy, bright to dark red, and may include small clots  Lochia will gradually decrease in amount with the color turning pink to brown to creamy white and colorless  Change pads frequently.  Odor should be  similar to menstrual flow  Lochia may increase with strenuous exercise and breastfeeding even after it has slowed  Do not use tampons until after your 6 week visit     Activity  Listen to your body. If bleeding and fatigue increase, you are probably doing too much.  Shower and tub bathing is okay  Okay to shower  Do not drive while on pain medications or as directed by your provider    Episiotomy/Tear  Usually takes 7-10 days to heal  Stiches will dissolve, some discomfort and itching may be experienced when healing and you may notice a stitch in the pad.  Continue to use tal-bottle, warm water soaks, dermoplast and Tucks  Continue to take pain medications as needed to help manage discomfort        Contraception  Wait until cessation of vaginal bleeding following doctor instructions to engage in intercourse  Breastfeeding is not a reliable form of birth control  Discuss contraception with your provider      Mood/Emotions    Becoming a mother involves many changes to your mind and body. Although you may have expected to be excited and happy, instead  you may be unsure of yourself in your new role, and you may find that you are easily upset, impatient, irritable or tearful. This is normal and comes and goes quickly. \"Baby blues\" may occur anywhere from a few days after delivery to several weeks postpartum and will pass in a short time.     Postpartum Depression:   Postpartum depression goes beyond \"baby blues\" and is persistent, intense, and severe. The most common symptom is a feeling of deep sadness. You may also feel as if you just can't cope with life. Other symptoms include:   thoughts of harming yourself or the baby   lack of interest in the baby, family, or friends   feeling guilty or worthless   feeling hopeless   uncontrollable crying   feelings of being a bad mother   trouble sleeping, oversleeping or feeling tired all the time   changes in appetite   strong feelings of irritability, anger, or nervousness   having trouble thinking clearly or making decisions   having headaches, aches and pains, or stomach problems that won't go away   thinking about death or suicide     The exact cause of postpartum depression is unknown. Changes in brain chemistry or structure are believed to play a big role in depression.?It may be due to changes in your hormones during and after childbirth. You may also be tired from caring for your baby and adjusting to being a mother. All these factors may make you feel depressed. In some cases, your genes may also play a role.     Postpartum depression can be treated in many ways. Talking to your healthcare provider is the first step toward feeling better.   Call your doctor or midwife immediately if you:   Cry for no clear reason   Have trouble sleeping, eating, and making choices   Question whether you can handle caring for your baby   Have intense feelings of sadness, anxiety, or despair that prevent you from being able to do your daily tasks     Here are some additional resources offering support for Postpartum Depression:    Postpartum Support International: (475) 656-4961   Mom's Mental Health Initiative ([facility]) https://momsmentalhealthmke.org/   Mental Health [facility] (125) 640-1272 or (211) [phone]   National [place] for Mental Health: (584) 758-1117   National Suicide Prevention (589) 691-6858   Postpartum Progress https://postpartumprogress.com/     Medications    Tylenol (Acetaminophen). Dose 650mg. Take by mouth every 4 hours as needed for pain or mild fever.  Do not take while taking Norco.   Ibuprofen (MOTRIN). Dose 600mg. Take by mouth every 6 hours as needed for cramping. Take with food to prevent stomach upset.    Dermoplast (Benzocaine/menthol)20-0.5%.   Dose 1 spray to perineum.   Every hour as needed for hemorrhoids or perineal discomfort.     When to notify your provider  A temperature greater than 101.0 degrees F.  Any increased pain that is not relieved by pain medication and normal comfort measures  If you experience signs and symptoms of a blood clot:  -uneven swelling in one leg  -warmth, pain, tenderness in the lower leg  If you experience signs and symptoms of postpartum pre-eclampsia:    -high blood pressure of 140/90 or greater    -severe headaches    -changes in vision, including temporary loss of vision, blurred vision or   light sensitivity    -swelling of the face and limbs    -upper abdominal pain, usually under the ribs on the right side    -nausea or vomiting    -decreased urination    -sudden weight gain (typically more than 2 pounds a week)  If you have a red, painful, hard spot in the breast along with fever or flu like symptoms.  Chest pain or shortness of breath  Vaginal discharge that becomes foul smelling  Vaginal bleeding that is soaking more than one pad in an hour or less, is bright red or if you are passing clots larger than a golf ball  Difficulty urinating, increased urinary frequency, burning with urination or difficulty emptying your bladder.  You having  difficulty with bowel movements or have not had a bowel movement within a week of delivery  You have symptoms of postpartum depression  You have severe persistent chills  You have fainting, dizziness, or lightheadedness    Help when you go home  Arrange for help after discharge. People often offer help to new parents. You can utilize their help with the care of other children, meal preparation, basic errands, or allow someone you trust to care for your baby while you rest or nap.    We would like to thank you for choosing [facility] for your birthing experience.  In order to provide excellent care, we will make a follow-up phone call in the next few days.      Burdick Pullings

## 2022-09-10 NOTE — PROGRESS NOTE ADULT - ATTENDING COMMENTS
63 year old woman with COPD on home O2 and AVAPS, RA admitted with SBO, underwent ex lap 12/15, wound dehiscence on 12/30 required repeat surgery, developed multifocal pneumonia and hypercapnic respiratory failure requiring re- intubation 1/6, s/p bronch 1/9/18, Enterococci bacteremia. Transferred to MICU for further management    - Dilaudid PCA for pain control  - wound care as per surgery  - tolerating tube feeds  - awake and alert doing well on CPAP  - has a lot of oral secretions which the patient is suctioning herself, minimal secretions from the ETT  - repeat cultures from 1/17 is negative last positive cultures are from 1/12  - MATEO to evaluate valves will determine length of ABx treatement  - hemodynamically stable  - will attempt extubation after MATEO  - patient has expressed that she would like to be re-intubated if extubation trial fails    critical care time 40 minutes  case discussed with sons at bedside [Fever] : fever [Nasal Congestion] : nasal congestion [Cough] : cough [Negative] : Genitourinary

## 2022-09-13 NOTE — PROVIDER CONTACT NOTE (OTHER) - DATE AND TIME:
----- Message from Katelin Limon MD sent at 9/13/2022  2:23 PM EDT -----  Pls let the pt know that the care conference is when we review his MRI results and we will address all questions then 12-Feb-2020 21:30

## 2022-10-06 NOTE — CONSULT NOTE ADULT - CONSULT REASON
Returned call to pt. Pt would like to have information sent to his work about his absence and hospital stay. Provided pt with information for FMLA/Disability Department including phone, fax, and e-mail address so that his work can send forms and request info through that department. Pt verbalized understanding.       ----- Message from Jun Denton sent at 10/6/2022 12:34 PM CDT -----  Contact: [phone]  Pt is calling in ref to his employment -- pt is calling to see how long will he be out of work -- please give pt a call back [phone]       worsening resp status on bipap, fevers

## 2022-10-06 NOTE — ED ADULT NURSE NOTE - CAS DISCH CONDITION
Outreach Attempt was made to schedule Overdue Care Gap.    The Outcome of the Outreach was Contact was not made, letter/portal message sent. Care Gaps discussed included Colorectal.     Stable

## 2022-10-12 NOTE — PATIENT PROFILE ADULT - NSPRONUTRITIONRISK_GEN_A_NUR
[FreeTextEntry1] : will start him on medrol and flexeril and therapy. F/u 4 weeks if radicular complaints persist will consider mri  No indicators present

## 2022-12-06 NOTE — PROGRESS NOTE ADULT - PROBLEM SELECTOR PLAN 3
Please let them know it is a 30 day supply. Also I am waiting until she sees psychiatry again so they can take over prescribing.    -  on admission; most recent TSH 8.35 2/1  - switch to synthroid 175 mg PO from 200 as per Endocrine, more proper weight based regimen   - Endocrinology following -  on admission; most recent TSH 8.35 2/1  - c/w 175 mg as per Endocrine, more proper weight based regimen   - Endocrinology following

## 2022-12-08 NOTE — PROGRESS NOTE ADULT - ASSESSMENT
-- DO NOT REPLY / DO NOT REPLY ALL --  -- Message is from Engagement Center Operations (ECO) --    General Patient Message:  The patient's mom called, the pharmacy is out of the cefdinir medication; however, they do have the amoxicillan.  Please send a prescription as soon as possible.  Please call once it is ready.    Caller Information       Type Contact Phone/Fax    12/08/2022 03:55 PM CST Phone (Incoming) Gino Henaoa R (Mother) [phone] (M)        Alternative phone number: na    Can a detailed message be left? Yes    Message Turnaround: [place]:    Refer to site's KB page for routing instructions    Please give this turnaround time to the caller:   \"You can expect to receive a response 1-3 business days after your provider's clinical team reviews the message\"               63 f with DM, COPD, RA on chronic steroids, presented initially with SBO, underwent ex lap 12/15 closed with 4 retention sutures, evicerated on 12/30 and closed with 6 more retention sutures, c/b multifocal pneumonia, developed hypercapnic respiratory failure requiring intubation 1/6, s/p bronch 1/9/18, c/b Enterococci bacteremia, extubated and transferred to floor, still on vanco but developed fever and elevated WBC    new fever and respiratory failure due to abd wound infection vs pneumonia   enterococcal bacteremia cleared on vanco    * f/u the blood cx   * chest/abd/pelvis CT with contrast  * c/w vanco 500 q 12 and monitor the trough  * c/w zosyn 3.375 q 8

## 2023-03-28 NOTE — BEHAVIORAL HEALTH ASSESSMENT NOTE - NS ED [facility] MED ROS RESPIRATORY
No complaints Patient education on post-op precautions, pt. able to carryover information to end of session

## 2023-04-12 NOTE — PROVIDER CONTACT NOTE (CRITICAL VALUE NOTIFICATION) - DATE AND TIME:
----- Message from Melanie Muñoz DO sent at 4/11/2023  6:06 PM EDT -----  Stool testing ok so far- waiting on one of the results   10-Nile-2020 06:30 10-Nile-2020 06:31

## 2023-04-20 NOTE — PROGRESS NOTE ADULT - ASSESSMENT
Problem/Plan - 1:  ·  Problem: HAP (hospital-acquired pneumonia).  Plan: cw vanco and meropenam  id and pulmonary fu  will monitor.     Problem/Plan - 2:  ·  Problem: Chronic hypoxic respiratory failure , Oxygen dependent.  Plan: bipap at night   pulmonary fu.     Problem/Plan - 3:  ·  Problem: Diabetes.  Plan: monitor FS  ISS.     Problem/Plan - 4:  ·  Problem: anemia.  Plan: transfuse 1 unit today  check guiac Improved

## 2023-06-01 NOTE — ED ADULT NURSE NOTE - NURSING SKIN WOUND APPEAR #1
06/01/23      Juliette Welch  [address] [facility] [address]      Juliette was evaluated in clinic today. Please take this into consideration. Please excuse her from work 06/02/23 to 06/04/23. She may return to work on 06/05/23.     Sincerely,       VIKAS Hinojosa  [address]  [address]  [facility] [address]  Phone: [phone]  Fax: [phone]            
clean/dry

## 2023-06-02 NOTE — PROGRESS NOTE ADULT - ASSESSMENT
63 year old woman with COPD on home O2 (3L), moderate pulmonary HTN, DM, RA on prednisone (10 mg daily x 30 years), HTN, s/p CVA (no residual deficit), s/p  x2 presents with abdominal pain for one day found to have SBO on imaging now post-op with continued nausea/vomiting Stable

## 2023-06-07 NOTE — PHYSICAL THERAPY INITIAL EVALUATION ADULT - SITTING BALANCE: STATIC
Pt requesting 2 more refills on vaginal estradiol pre filled syringe be called in. Pt states she is moving to [facility] within the next couple of months and would like to have enough medication to last her until she finds an OBGYN  where she is moving to. What do you recommend ? Pt is coming in to sign records release form. good balance

## 2023-07-12 NOTE — PROGRESS NOTE ADULT - SUBJECTIVE AND OBJECTIVE BOX
Patient is leaving to [place] on 08/04/2023 and will be there for a week. He says his wife and children's doctors are prescribing malaria pills and recommending Hep B vaccination. He would like to know if provider recommends anything for traveling to [place]. Please advise.   Parminder Cavanaugh, pgy 1  [facility]  [phone]  After 7pm; page night float    Patient is a 64y old  Female who presents with a chief complaint of change in mental status (09 Mar 2020 17:08)      SUBJECTIVE / OVERNIGHT EVENTS: No acute events overnight. Pt seen and examined at bedside. Refusing to be evaluated. Shouting she wants her medication, which she had received. Asking for a doctor - of which when I come to bedside she states I am not her doctor.     ADDITIONAL REVIEW OF SYSTEMS: unable to assess.     MEDICATIONS  (STANDING):  ARIPiprazole  Solution 2.5 milliGRAM(s) Oral daily  budesonide  80 MICROgram(s)/formoterol 4.5 MICROgram(s) Inhaler 2 Puff(s) Inhalation two times a day  dextrose 5%. 1000 milliLiter(s) (50 mL/Hr) IV Continuous <Continuous>  dextrose 50% Injectable 12.5 Gram(s) IV Push once  dextrose 50% Injectable 25 Gram(s) IV Push once  dextrose 50% Injectable 25 Gram(s) IV Push once  heparin  Injectable 5000 Unit(s) SubCutaneous every 8 hours  influenza   Vaccine 0.5 milliLiter(s) IntraMuscular once  insulin glargine Injectable (LANTUS) 24 Unit(s) SubCutaneous <User Schedule>  insulin lispro (HumaLOG) corrective regimen sliding scale   SubCutaneous at bedtime  insulin lispro (HumaLOG) corrective regimen sliding scale   SubCutaneous three times a day before meals  insulin lispro Injectable (HumaLOG) 10 Unit(s) SubCutaneous three times a day with meals  levothyroxine 175 MICROGram(s) Oral daily  losartan 25 milliGRAM(s) Oral daily  metoprolol tartrate 25 milliGRAM(s) Oral two times a day  multivitamin/minerals/iron Oral Solution (CENTRUM) 15 milliLiter(s) Oral daily  nystatin Powder 1 Application(s) Topical every 12 hours  pantoprazole    Tablet 40 milliGRAM(s) Oral <User Schedule>  predniSONE   Tablet 15 milliGRAM(s) Oral daily  primidone 50 milliGRAM(s) Oral at bedtime  simvastatin 20 milliGRAM(s) Oral at bedtime  thiamine 100 milliGRAM(s) Oral daily    MEDICATIONS  (PRN):  acetaminophen   Tablet .. 975 milliGRAM(s) Oral every 6 hours PRN Moderate Pain (4 - 6), Severe Pain (7 - 10)  acetaminophen  Suppository .. 650 milliGRAM(s) Rectal every 6 hours PRN Temp greater or equal to 38C (100.4F)  albuterol/ipratropium for Nebulization 3 milliLiter(s) Nebulizer every 6 hours PRN Shortness of Breath and/or Wheezing  dextrose 40% Gel 15 Gram(s) Oral once PRN Blood Glucose LESS THAN 70 milliGRAM(s)/deciLiter  glucagon  Injectable 1 milliGRAM(s) IntraMuscular once PRN Glucose <70 milliGRAM(s)/deciLiter  haloperidol    Injectable 0.5 milliGRAM(s) IV Push every 4 hours PRN agitation  melatonin 3 milliGRAM(s) Oral at bedtime PRN Insomnia      CAPILLARY BLOOD GLUCOSE      POCT Blood Glucose.: 205 mg/dL (09 Mar 2020 22:34)  POCT Blood Glucose.: 151 mg/dL (09 Mar 2020 20:25)  POCT Blood Glucose.: 267 mg/dL (09 Mar 2020 14:16)    I&O's Summary      PHYSICAL EXAM:  Vital Signs Last 24 Hrs  T(C): 37.2 (10 Mar 2020 04:26), Max: 37.2 (10 Mar 2020 04:26)  T(F): 98.9 (10 Mar 2020 04:26), Max: 98.9 (10 Mar 2020 04:26)  HR: 96 (10 Mar 2020 05:39) (96 - 96)  BP: 142/78 (10 Mar 2020 04:26) (133/79 - 142/78)  BP(mean): --  RR: 18 (10 Mar 2020 08:53) (18 - 20)  SpO2: 94% (10 Mar 2020 08:53) (94% - 96%)  Unable to perform PE.     LABS:                        9.6    10.80 )-----------( 385      ( 09 Mar 2020 16:24 )             31.0     03-09    137  |  105  |  11  ----------------------------<  278<H>  3.9   |  16<L>  |  1.01    Ca    9.0      09 Mar 2020 15:53  Phos  1.4     03-09  Mg     1.7     03-09                  RADIOLOGY & ADDITIONAL TESTS:  Results Reviewed:   Imaging Personally Reviewed:  Electrocardiogram Personally Reviewed:    COORDINATION OF CARE:  Care Discussed with Consultants/Other Providers [Y/N]:  Prior or Outpatient Records Reviewed [Y/N]: Parminder Cavanaugh, pgy 1  [facility]  [phone]  After 7pm; page night float    Patient is a 64y old  Female who presents with a chief complaint of change in mental status (09 Mar 2020 17:08)      SUBJECTIVE / OVERNIGHT EVENTS: No acute events overnight. Pt seen and examined at bedside. Refusing to be evaluated. Shouting she wants her medication, which she had received. Asking for a doctor - of which when I come to bedside she states I am not her doctor.     ADDITIONAL REVIEW OF SYSTEMS: unable to assess.     MEDICATIONS  (STANDING):  ARIPiprazole  Solution 2.5 milliGRAM(s) Oral daily  budesonide  80 MICROgram(s)/formoterol 4.5 MICROgram(s) Inhaler 2 Puff(s) Inhalation two times a day  dextrose 5%. 1000 milliLiter(s) (50 mL/Hr) IV Continuous <Continuous>  dextrose 50% Injectable 12.5 Gram(s) IV Push once  dextrose 50% Injectable 25 Gram(s) IV Push once  dextrose 50% Injectable 25 Gram(s) IV Push once  heparin  Injectable 5000 Unit(s) SubCutaneous every 8 hours  influenza   Vaccine 0.5 milliLiter(s) IntraMuscular once  insulin glargine Injectable (LANTUS) 24 Unit(s) SubCutaneous <User Schedule>  insulin lispro (HumaLOG) corrective regimen sliding scale   SubCutaneous at bedtime  insulin lispro (HumaLOG) corrective regimen sliding scale   SubCutaneous three times a day before meals  insulin lispro Injectable (HumaLOG) 10 Unit(s) SubCutaneous three times a day with meals  levothyroxine 175 MICROGram(s) Oral daily  losartan 25 milliGRAM(s) Oral daily  metoprolol tartrate 25 milliGRAM(s) Oral two times a day  multivitamin/minerals/iron Oral Solution (CENTRUM) 15 milliLiter(s) Oral daily  nystatin Powder 1 Application(s) Topical every 12 hours  pantoprazole    Tablet 40 milliGRAM(s) Oral <User Schedule>  predniSONE   Tablet 15 milliGRAM(s) Oral daily  primidone 50 milliGRAM(s) Oral at bedtime  simvastatin 20 milliGRAM(s) Oral at bedtime  thiamine 100 milliGRAM(s) Oral daily    MEDICATIONS  (PRN):  acetaminophen   Tablet .. 975 milliGRAM(s) Oral every 6 hours PRN Moderate Pain (4 - 6), Severe Pain (7 - 10)  acetaminophen  Suppository .. 650 milliGRAM(s) Rectal every 6 hours PRN Temp greater or equal to 38C (100.4F)  albuterol/ipratropium for Nebulization 3 milliLiter(s) Nebulizer every 6 hours PRN Shortness of Breath and/or Wheezing  dextrose 40% Gel 15 Gram(s) Oral once PRN Blood Glucose LESS THAN 70 milliGRAM(s)/deciLiter  glucagon  Injectable 1 milliGRAM(s) IntraMuscular once PRN Glucose <70 milliGRAM(s)/deciLiter  haloperidol    Injectable 0.5 milliGRAM(s) IV Push every 4 hours PRN agitation  melatonin 3 milliGRAM(s) Oral at bedtime PRN Insomnia      CAPILLARY BLOOD GLUCOSE      POCT Blood Glucose.: 205 mg/dL (09 Mar 2020 22:34)  POCT Blood Glucose.: 151 mg/dL (09 Mar 2020 20:25)  POCT Blood Glucose.: 267 mg/dL (09 Mar 2020 14:16)    I&O's Summary      PHYSICAL EXAM:  Vital Signs Last 24 Hrs  T(C): 37.2 (10 Mar 2020 04:26), Max: 37.2 (10 Mar 2020 04:26)  T(F): 98.9 (10 Mar 2020 04:26), Max: 98.9 (10 Mar 2020 04:26)  HR: 96 (10 Mar 2020 05:39) (96 - 96)  BP: 142/78 (10 Mar 2020 04:26) (133/79 - 142/78)  BP(mean): --  RR: 18 (10 Mar 2020 08:53) (18 - 20)  SpO2: 94% (10 Mar 2020 08:53) (94% - 96%)    Unable to perform PE. REFUSED.    LABS:                        9.6    10.80 )-----------( 385      ( 09 Mar 2020 16:24 )             31.0     03-09    137  |  105  |  11  ----------------------------<  278<H>  3.9   |  16<L>  |  1.01    Ca    9.0      09 Mar 2020 15:53  Phos  1.4     03-09  Mg     1.7     03-09                  RADIOLOGY & ADDITIONAL TESTS:  Results Reviewed:   Imaging Personally Reviewed:  Electrocardiogram Personally Reviewed:    COORDINATION OF CARE:  Care Discussed with Consultants/Other Providers [Y/N]:  Prior or Outpatient Records Reviewed [Y/N]:

## 2023-07-16 NOTE — PROGRESS NOTE ADULT - PROBLEM/PLAN-1
DISPLAY PLAN FREE TEXT
improvement verbalized
DISPLAY PLAN FREE TEXT

## 2023-07-20 NOTE — PROGRESS NOTE ADULT - PROBLEM SELECTOR PROBLEM 6
Arthritis 1 Principal Discharge DX:	Neck pain  Secondary Diagnosis:	S/P epidural steroid injection  Secondary Diagnosis:	Leukocytosis  Secondary Diagnosis:	Occipital headache

## 2023-07-21 NOTE — PROGRESS NOTE ADULT - PROBLEM SELECTOR PLAN 4
ANTONINO Harris Stroke neurology Dr. D'Amico donnell jones Neurosurgery Patient w/ anterior abdominal wound from SBO revision   - wound care following; change dressing every 7 days; changed 2/6, 2/13  - wound base clean w/o infection  -CT abdomen ordered to r/o cause of infection

## 2023-08-09 NOTE — PROGRESS NOTE ADULT - SUBJECTIVE AND OBJECTIVE BOX
Anesthesia Pain Management Service    SUBJECTIVE: Patient is doing well with IV PCA and no significant problems reported.    Pain Scale Score	At rest: ___ 	With Activity: ___ 	[X ] Refer to charted pain scores    THERAPY:    [ ] IV PCA Morphine		[ ] 5 mg/mL	[ ] 1 mg/mL  [X ] IV PCA Hydromorphone	[ ] 5 mg/mL	[X ] 1 mg/mL  [ ] IV PCA Fentanyl		[ ] 50 micrograms/mL    Demand dose __0.2_ lockout __6_ (minutes) Continuous Rate _0__ Total: _5.35__  Daily      MEDICATIONS  (STANDING):  ALBUTerol    90 MICROgram(s) HFA Inhaler 1 Puff(s) Inhalation every 6 hours  amLODIPine   Tablet 10 milliGRAM(s) Oral daily  buDESOnide 160 MICROgram(s)/formoterol 4.5 MICROgram(s) Inhaler 2 Puff(s) Inhalation two times a day  chlorhexidine 0.12% Liquid 15 milliLiter(s) Swish and Spit two times a day  chlorhexidine 4% Liquid 1 Application(s) Topical <User Schedule>  collagenase Ointment 1 Application(s) Topical daily  enoxaparin Injectable 40 milliGRAM(s) SubCutaneous daily  furosemide   Injectable 40 milliGRAM(s) IV Push daily  gabapentin   Solution 400 milliGRAM(s) Oral three times a day  HYDROmorphone PCA (1 mG/mL) 30 milliLiter(s) PCA Continuous PCA Continuous  influenza   Vaccine 0.5 milliLiter(s) IntraMuscular once  insulin glargine Injectable (LANTUS) 26 Unit(s) SubCutaneous at bedtime  insulin lispro (HumaLOG) corrective regimen sliding scale   SubCutaneous every 8 hours  insulin lispro Injectable (HumaLOG) 10 Unit(s) SubCutaneous every 8 hours  levothyroxine 137 MICROGram(s) Oral daily  metoprolol tartrate 25 milliGRAM(s) Enteral Tube two times a day  pantoprazole   Suspension 40 milliGRAM(s) Oral daily  predniSONE   Tablet 10 milliGRAM(s) Oral daily  simvastatin 20 milliGRAM(s) Oral at bedtime  sodium chloride 3%  Inhalation 3 milliLiter(s) Inhalation two times a day    MEDICATIONS  (PRN):  HYDROmorphone PCA (1 mG/mL) Rescue Clinician Bolus 0.5 milliGRAM(s) IV Push every 15 minutes PRN for Pain Scale GREATER THAN 6  LORazepam     Tablet 0.5 milliGRAM(s) Oral two times a day PRN Anxiety  naloxone Injectable 0.1 milliGRAM(s) IV Push every 3 minutes PRN For ANY of the following changes in patient status:  A. RR LESS THAN 10 breaths per minute, B. Oxygen saturation LESS THAN 90%, C. Sedation score of 6  ondansetron Injectable 4 milliGRAM(s) IV Push every 6 hours PRN Nausea      OBJECTIVE:    Sedation Score:	[ X] Alert	[ ] Drowsy 	[ ] Arousable	[ ] Asleep	[ ] Unresponsive    Side Effects:	[X ] None	[ ] Nausea	[ ] Vomiting	[ ] Pruritus  		[ ] Other:    Vital Signs Last 24 Hrs  T(C): 37.6 (21 Jan 2019 04:00), Max: 37.7 (21 Jan 2019 00:00)  T(F): 99.6 (21 Jan 2019 04:00), Max: 99.8 (21 Jan 2019 00:00)  HR: 103 (21 Jan 2019 08:00) (87 - 122)  BP: 133/83 (21 Jan 2019 08:00) (97/63 - 159/85)  BP(mean): 94 (21 Jan 2019 08:00) (67 - 120)  RR: 18 (21 Jan 2019 08:00) (16 - 32)  SpO2: 91% (21 Jan 2019 08:00) (91% - 100%)    ASSESSMENT/ PLAN    Therapy to  be:	[ X] Continue   [ ] Discontinued   [ ] Change to prn Analgesics    Documentation and Verification of current medications:   [X] Done	[ ] Not done, not elligible    Comments: PAST MEDICAL HISTORY:  CAD (coronary artery disease)     HLD (hyperlipidemia)     HTN (hypertension)     Kidney stones     OA (osteoarthritis)     Type 2 diabetes mellitus

## 2023-09-22 NOTE — H&P ADULT - NSHPPOAPULMEMBOLUS_GEN_A_CORE
----- Message from HELENA Holloway sent at 9/21/2023  2:46 PM EDT -----  Colon polyps benign.  Placed in recall for repeat colonoscopy in 5 years.  Mild nonspecific chronic inflammation noted at the ileocecal valve.  Given history of previous colitis recommend IBD serology to rule out ulcerative colitis and Crohn's.  Order placed.  Would also recommend mesalamine, sent to patient's pharmacy.  Keep already scheduled follow-up.   no

## 2023-11-20 NOTE — PROGRESS NOTE BEHAVIORAL HEALTH - NSBHFUPSUICINTERVAL_PSY_A_CORE
ALEXANDRIA MCGUIRE    Patient Age: 58 year old   Refill request by: pharmacy    Refill to be: [facility] [address] [place] [address] [address]    Medication requested to be refilled:   hydrOXYzine (ATARAX) 10 MG tablet - last filled on 10/24        Patient's next appointment is scheduled for 3/26/2024    Patient's last appointment with this Provider was 9/26/2023               WEIGHT AND HEIGHT:   Wt Readings from Last 1 Encounters:   06/21/23 (!) 136.7 kg (301 lb 5.9 oz)     Ht Readings from Last 1 Encounters:   06/21/23 6' 2\" (1.88 m)     BMI Readings from Last 1 Encounters:   06/21/23 38.69 kg/m²       ALLERGIES:  Patient has no known allergies.  Current Outpatient Medications   Medication Sig Dispense Refill   • sildenafil (VIAGRA) 50 MG tablet TAKE 1 TABLET BY MOUTH AS NEEDED FOR ERECTILE DYSFUNCTION 30 tablet 0   • metFORMIN (GLUCOPHAGE-XR) 500 MG 24 hr tablet TAKE 2 TABLETS BY MOUTH TWICE DAILY WITH MEALS 360 tablet 0   • Mounjaro 10 MG/0.5ML Solution Pen-injector INJECT 10MG INTO THE SKIN EVERY 7 DAYS 4 mL 0   • glipiZIDE (GLUCOTROL) 10 MG tablet TAKE 1 TABLET BY MOUTH TWICE DAILY BEFORE MEAL(S) 180 tablet 1   • hydrOXYzine (ATARAX) 10 MG tablet Take 1 tablet by mouth twice daily as needed for anxiety 60 tablet 0   • cyclobenzaprine (FLEXERIL) 10 MG tablet Take 1 tablet by mouth at bedtime as needed for Muscle spasms. 10 tablet 0   • Azelastine HCl 137 MCG/SPRAY Solution USE 1 SPRAY(S) IN EACH NOSTRIL IN THE MORNING AND 1 IN THE EVENING 30 mL 3   • lamoTRIgine (LaMICtal) 100 MG tablet Take 1 tablet by mouth in the morning and 1 tablet in the evening. 180 tablet 1   • clonazePAM (KlonoPIN) 0.5 MG tablet Take 1 tablet by mouth daily as needed for Anxiety. 90 tablet 0   • levocetirizine (XYZAL) 5 MG tablet TAKE 1 TABLET BY MOUTH IN THE MORNING AND 1 IN THE EVENING 180 tablet 1   • escitalopram (LEXAPRO) 20 MG tablet TAKE 1 TABLET BY MOUTH IN THE MORNING AND 1 IN THE EVENING 180 tablet 0   • simvastatin  (ZOCOR) 10 MG tablet Take 1 tablet by mouth daily. 90 tablet 2   • ondansetron (ZOFRAN) 8 MG tablet TAKE 1 TABLET BY MOUTH EVERY 8 HOURS AS NEEDED FOR NAUSEA 12 tablet 0   • Blood Glucose Monitoring Suppl ([employer] CONTOUR MONITOR) Device Test bid diagnosis: E11.9 non insulin dependent     • Lancets Misc. Misc Test bid diagnosis: E11.9 non insulin dependent . Patient using Contour meter       No current facility-administered medications for this visit.         PCP: Victoria Crowley PA-C         INS: Payor: [employer] / Plan: HINA MATTA BP [id number] / Product Type: [facility] SILVER   PATIENT ADDRESS:  [facility] [address] [address]   none known

## 2023-11-24 NOTE — ED PROVIDER NOTE - ATTENDING CONTRIBUTION TO CARE
Labs printed.    I was physically present for the E/M service provided. I agree with above history, physical, and plan which I have reviewed and edited where appropriate. I was physically present for the key portions of the service provided.

## 2023-11-27 NOTE — DISCHARGE NOTE PROVIDER - CARE PROVIDER_API CALL
Detail Level: Detailed L[facility]pez Duenas [address] [address]  Phone: [phone]  Fax: [phone]  Follow Up Time:

## 2023-12-21 NOTE — OCCUPATIONAL THERAPY INITIAL EVALUATION ADULT - SITTING BALANCE: DYNAMIC
CC: Diabetes    Visit: Subsequent    Subjective   HPI Lewis Walters is a 63 year old male who presents today for evaluation and treatment of diabetes mellitus.      151, 153, 142, 169, 132    Is at 16 units nightly    No lightheadedness, sweating, clamminess, palpitations   Trying to follow dietary recommendations  Avoiding processed foods, trying to eat more vegetables, used to eat sweets a lot, cutting back     Metformin 500 and 1000 then will increase to BID this weekend   Has appointment with ophthalmology in January   Dr Parekh podiatry    Long term goal is for PET scan             Diabetes:  The problem has been present for months, although suspect was undiagnosed   Patient reports:  Control has been satisfactory  Home blood sugar records: 120-160  Polyuria: better but remains   Polydipsia: No  Polyphagia: No  Foot concerns: yes, nail concerns, is establishing with podiatry   Hypoglycemia: No  Gastroparesis: No  Low-fat diet: Yes    Advised Garcia that he should have a dilated diabetic eye exam yearly.    Patient's barriers to care plan: No    Does the patient have the ability to self monitor/manage and comply to their treatment plan:Yes     Compliance to treatment plan: Yes    Medication adherence:  Patient reports adherence to dosing schedules Yes  Side effects of medication: No       He notes no other additional concerns at this time.    Patient's medications, allergies, past medical, surgical, social and family histories were reviewed and updated as appropriate.    Review of Systems  As above     Objective   Vitals: /64 (BP Location: LUE - Left upper extremity, Patient Position: Sitting, Cuff Size: Regular)   Pulse 86   Temp 98.4 °F (36.9 °C) (Temporal)   Ht 6' 5\" (1.956 m)   Wt 103.4 kg (228 lb)   BMI 27.04 kg/m²   BSA 2.36 m²    Physical Exam  Vitals reviewed.   Constitutional:       General: He is not in acute distress.     Appearance: Normal appearance. He is not toxic-appearing or  diaphoretic.   HENT:      Head: Normocephalic and atraumatic.      Neck: Normal range of motion.   Eyes:      Extraocular Movements: Extraocular movements intact.      Pupils: Pupils are equal, round, and reactive to light.   Cardiovascular:      Rate and Rhythm: Normal rate and regular rhythm.      Heart sounds: Normal heart sounds. No murmur heard.  Pulmonary:      Effort: Pulmonary effort is normal. No respiratory distress.      Breath sounds: Wheezing present.      Comments: Scattered wheezes   Abdominal:      General: Abdomen is flat. Bowel sounds are normal. There is no distension.      Palpations: Abdomen is soft.      Tenderness: There is no abdominal tenderness.   Musculoskeletal:      Right lower leg: No edema.      Left lower leg: No edema.   Skin:     General: Skin is warm.   Neurological:      General: No focal deficit present.      Mental Status: He is alert.   Psychiatric:         Mood and Affect: Mood normal.         Behavior: Behavior normal.         Assessment   Problem List Items Addressed This Visit          Endocrine and Metabolic    Type 2 diabetes mellitus without complication, with long-term current use of insulin (CMD) - Primary     -Current regimen: Lantus 16 u at night, Metformin 1000 BID  -Continue to check fasting sugars with goal <130. As most recent was 132, do not want patient to increase insulin further at this time as today will be first day of Metformin 1000 BID. He will keep track of fasting sugars and send message next week with values. Advised to contact office sooner is sugars become elevated or too low. Symptoms of hypoglycemia discussed. Advised to eat more frequent, smaller meals throughout the day. He has seen diabetic educator and dietician, notes reviewed. Patient has been implementing advice in his diet  -On statin, tolerating ok   -Appt with ophthalmology scheduled in January, looking to schedule with Podiatry  -Microalbumin elevated x 1, will recheck and assess need  for ACE/Arb. He is normotensive  -short term goal of PET scan, sugars <200 to complete scan. If well controlled next week I feel patient should be able to complete scan   -Prevnar vaccine given today          Relevant Orders    Glycohemoglobin    SERVICE TO PODIATRY       Hematology and Neoplasia    Leukocytosis     Notes did have active tooth infection at last draw, will repeat in 2 weeks. Also long term smoker, could have baseline elevation of WBC         Relevant Orders    CBC with Automated Differential       Symptoms and Signs    Elevated alkaline phosphatase level     In setting of otherwise normal LFT's. Will recheck in 2 weeks          Relevant Orders    Comprehensive Metabolic Panel     Other Visit Diagnoses       Need for vaccination        Relevant Orders    PNEUMOCOCCAL CONJUGATE 20 VALENT VACC (PREVNAR-20) (Completed)            Medication changes: No.  Patient understands risks of not taking medications or adhering to medication schedule.    Follow up with me in  1 week send message of blood sugar readings, 2 weeks in office and labs   Follow up with Ophthalmology  Follow up with Podiatry    Discussed treatment options, plan with patient and all questions answered.  Also discussed his current diet and exercise requirements and encouraged healthy lifestyle to reduce potential for serious health complications.     The patient was instructed to call if having episodes of hypoglycemia or hyperglycemia or if has any questions or problems about his diabetes.    The care plan was discussed with the patient in detail. All of patient's questions were answered to the best of my ability.  The patient was advised on signs and symptoms that would warrant calling sooner or presenting to UC/ED if present. The patient and/or allowed family members indicated understanding and agreement with the plan at the conclusion of the office visit.    Anette Caldwell DO  [facility]   [place], [place]  Ph: [phone]    poor plus

## 2024-01-01 NOTE — PROGRESS NOTE ADULT - ASSESSMENT
64F w/ a PMHx arthritis, hypothyroidism, pulmonary HTN, DM on insulin, HTN, COPD/CHRIS on noctournal CPAP and 3L home), RA on chronic prednisone, chronic tremors, SBO s/p ex lap with lysis of adhesions c/b evisceration 2/2 a coughing episode s/p repeat ex lap c/b multifocal pneumonia and hypercapnic respiratory failure requiring intubation 1/6/19, s/p bronch 1/9/19, and extubated on 1/24/19; further c/b Enterococci bacteremia), p/w agitation 3 weeks of unclear etiology, found to be significantly hypothyroid (), extremely combative and paranoid of family and healthcare staff, s/p MRI brain and LP found to have small right sided SDH, course c/b hyperglycemia; now w/ new change in mental status w/ repeat CT head w/o acute changes; CTA head and neck and CTV without any new changes. watching and listening to you. Talking, cuddling, hugging, and kissing are all ways that you can help your baby grow and develop.    Your baby may look at faces and follow an object with their eyes. They may respond to sounds by blinking, crying, or seeming to be startled.   At this stage, your baby may sleep most of the day and wake up about every 2 to 3 hours to eat. Each baby is different.         Feeding your baby   Feed your baby whenever they're hungry.  If you formula-feed, use a formula with iron.  Don't warm bottles in the microwave.        Keeping your baby safe while they sleep   Always put your baby to sleep on their back.  Don't put sleep positioners, bumper pads, loose bedding, or stuffed animals in the crib.  Don't sleep with your baby. This includes in your bed or on a couch or chair.  Have your baby sleep in the same room as you for at least the first 6 months.  Don't place your baby in a car seat, sling, swing, bouncer, or stroller to sleep.        Soothing your crying baby   Change their diaper if it's dirty or wet.  Feed and burp them.  Add or remove clothes.  Hold them close.  Give them a warm bath. Wrap them in a blanket.  If your baby still cries, put them in the crib and close the door. Wait 10 to 15 minutes to see if they fall asleep.  Try these tips again if your baby is still crying.        Caring for yourself   Trust yourself. If something doesn't feel right with your body, tell your doctor.  Sleep when your baby sleeps, drink plenty of fluids, and ask for help if you need it.  Watch for the \"baby blues.\" If you or your partner feels sad or anxious for more than 2 weeks, tell your doctor.        Getting vaccines   Make sure your baby gets all the recommended vaccines.  Follow-up care is a key part of your child's treatment and safety. Be sure to make and go to all appointments, and call your doctor if your child is having problems. It's also a good idea to know your child's test results

## 2024-01-01 NOTE — PROGRESS NOTE ADULT - PROBLEM SELECTOR PLAN 2
PMD - Presents with change in mental status; able to communicate coherently but generally distrustful  - Ddx: Psychiatric/paranoia vs endocrinopathy (given abnormal thyroid dysfunction) vs infectious (unlikely as patient stable)  - CT from previous admission benign, consider MRI if condition worsens  - Monitoring off Abx - Per son, mental status change for 3-4w, characterized by increased aggressiveness and distrust of family and home health aids  - Ddx: Psychiatric/paranoia vs endocrinopathy (given abnormal thyroid dysfunction) vs infectious (unlikely as patient stable, Bcx negative)  - Highly aggressive and physical, on 1:1 observation  - CT from previous admission benign, consider MRI if condition worsens  - Per Psych, DOES NOT HAVE CAPACITY TO LEAVE OR REFUSE MEDS  - Haldol 2mg IV or IM Q4H for agitation - Per son, mental status change for 3-4w, characterized by increased aggressiveness and distrust of family and home health aids  - Ddx: Psychiatric/paranoia vs endocrinopathy (given abnormal thyroid dysfunction) vs infectious (unlikely as patient stable, Bcx negative)  - Highly aggressive and physical, on 1:1 observation  - Seen by Neuro, no acute intervention  - CT from previous admission benign, consider MRI if condition worsens  - Per Psych, DOES NOT HAVE CAPACITY TO LEAVE OR REFUSE MEDS  - Haldol 2mg IV or IM Q4H for agitation

## 2024-01-16 NOTE — PROGRESS NOTE ADULT - SUBJECTIVE AND OBJECTIVE BOX
Yes    He was   Patient is a 63y old  Female who presents with a chief complaint of sob (29 Mar 2019 11:13)      Any change in ROS: pt is doing ok : no OSB : no cough     MEDICATIONS  (STANDING):  ALBUTerol/ipratropium for Nebulization 3 milliLiter(s) Nebulizer every 6 hours  buDESOnide  80 MICROgram(s)/formoterol 4.5 MICROgram(s) Inhaler 2 Puff(s) Inhalation two times a day  dextrose 5%. 1000 milliLiter(s) (50 mL/Hr) IV Continuous <Continuous>  dextrose 50% Injectable 12.5 Gram(s) IV Push once  dextrose 50% Injectable 25 Gram(s) IV Push once  dextrose 50% Injectable 25 Gram(s) IV Push once  docusate sodium 100 milliGRAM(s) Oral three times a day  enoxaparin Injectable 40 milliGRAM(s) SubCutaneous every 24 hours  furosemide    Tablet 40 milliGRAM(s) Oral two times a day  gabapentin 300 milliGRAM(s) Oral three times a day  guaiFENesin   Syrup  (Sugar-Free) 200 milliGRAM(s) Oral every 6 hours  influenza   Vaccine 0.5 milliLiter(s) IntraMuscular once  insulin glargine Injectable (LANTUS) 20 Unit(s) SubCutaneous at bedtime  insulin lispro (HumaLOG) corrective regimen sliding scale   SubCutaneous three times a day before meals  insulin lispro Injectable (HumaLOG) 10 Unit(s) SubCutaneous three times a day before meals  levothyroxine 137 MICROGram(s) Oral daily  pantoprazole    Tablet 40 milliGRAM(s) Oral two times a day  polyethylene glycol 3350 17 Gram(s) Oral two times a day  predniSONE   Tablet 20 milliGRAM(s) Oral daily  primidone 25 milliGRAM(s) Oral daily  senna 2 Tablet(s) Oral at bedtime  sildenafil (REVATIO) 20 milliGRAM(s) Oral three times a day  simvastatin 20 milliGRAM(s) Oral at bedtime  sucralfate 1 Gram(s) Oral four times a day  traMADol 50 milliGRAM(s) Oral two times a day    MEDICATIONS  (PRN):  dextrose 40% Gel 15 Gram(s) Oral once PRN Blood Glucose LESS THAN 70 milliGRAM(s)/deciliter  glucagon  Injectable 1 milliGRAM(s) IntraMuscular once PRN Glucose LESS THAN 70 milligrams/deciliter  ondansetron Injectable 4 milliGRAM(s) IV Push every 6 hours PRN Nausea  oxyCODONE    IR 10 milliGRAM(s) Oral every 4 hours PRN Moderate Pain (4 - 6)  sodium chloride 0.65% Nasal 1 Spray(s) Both Nostrils every 4 hours PRN Nasal Congestion    Vital Signs Last 24 Hrs  T(C): 36.5 (29 Mar 2019 07:08), Max: 37.2 (28 Mar 2019 19:04)  T(F): 97.7 (29 Mar 2019 07:08), Max: 99 (28 Mar 2019 19:04)  HR: 94 (29 Mar 2019 10:31) (86 - 102)  BP: 134/78 (29 Mar 2019 07:08) (116/81 - 142/85)  BP(mean): --  RR: 18 (29 Mar 2019 07:08) (16 - 20)  SpO2: 95% (29 Mar 2019 10:31) (94% - 100%)    I&O's Summary    28 Mar 2019 07:01  -  29 Mar 2019 07:00  --------------------------------------------------------  IN: 420 mL / OUT: 200 mL / NET: 220 mL    29 Mar 2019 07:01  -  29 Mar 2019 11:34  --------------------------------------------------------  IN: 150 mL / OUT: 0 mL / NET: 150 mL          Physical Exam:   GENERAL: NAD, well-groomed, well-developed  HEENT: KWAME/   Atraumatic, Normocephalic  ENMT: No tonsillar erythema, exudates, or enlargement; Moist mucous membranes, Good dentition, No lesions  NECK: Supple, No JVD, Normal thyroid  CHEST/LUNG: Clear to auscultaion, ; No rales, rhonchi, wheezing, or rubs  CVS: Regular rate and rhythm; No murmurs, rubs, or gallops  GI: : Soft, Nontender, Nondistended; Bowel sounds present  NERVOUS SYSTEM:  Alert & Oriented X3, Good concentration; Motor Strength 5/5 B/L upper and lower extremities; DTRs 2+ intact and symmetric  EXTREMITIES:  2+ Peripheral Pulses, No clubbing, cyanosis, or edema  LYMPH: No lymphadenopathy noted  SKIN: No rashes or lesions  ENDOCRINOLOGY: No Thyromegaly  PSYCH: Appropriate    Labs:  24                            10.3   10.5  )-----------( 479      ( 29 Mar 2019 09:31 )             32.4                         9.3    9.16  )-----------( 345      ( 27 Mar 2019 09:56 )             31.1                         7.6    8.85  )-----------( 389      ( 26 Mar 2019 08:47 )             25.5     03-28    136  |  95<L>  |  19  ----------------------------<  197<H>  4.2   |  24  |  0.71  03-27    137  |  98  |  16  ----------------------------<  192<H>  4.5   |  24  |  0.76  03-26    139  |  99  |  17  ----------------------------<  96  3.8   |  24  |  0.86    Ca    9.6      28 Mar 2019 09:39      CAPILLARY BLOOD GLUCOSE      POCT Blood Glucose.: 116 mg/dL (29 Mar 2019 08:26)  POCT Blood Glucose.: 96 mg/dL (29 Mar 2019 04:13)  POCT Blood Glucose.: 100 mg/dL (29 Mar 2019 00:54)  POCT Blood Glucose.: 73 mg/dL (28 Mar 2019 22:32)  POCT Blood Glucose.: 67 mg/dL (28 Mar 2019 22:05)                  RECENT CULTURES:  03-22 @ 18:18 .Blood Blood                No growth at 5 days.          RESPIRATORY CULTURES:      < from: [facility] Duplex Lower Ext Vein Scan, Bilat (03.28.19 @ 15:07) >  There is normal compressibility of the bilateral common femoral, femoral   and popliteal veins. Bilateral calf veins cannot be visualized due to   swelling and patient's body habitus.    Doppler examination shows normal spontaneous and phasic flow in the   visualized venous segments.    IMPRESSION:     No evidence of bilateral proximal lower extremity deep venous thrombosis.   Bilateral calf veins not visualized and therefore not evaluated due to   swelling and patient's body habitus.      < from: CT Angio Chest w/ IV Cont (03.21.19 @ 11:03) >    Impression: No pulmonary embolus is noted within the main, right and left   main, lobar and segmental pulmonary arteries bilaterally. Evaluation of   the subsegmental pulmonary arteries is limited due to breathing motion   artifact.    Patchy opacities/consolidations within both lower lobes and the right   middle lobe have decreased when compared to previous exam. Residual   patchy opacities are still noted.    Patchy groundglass opacities are noted within both upper lobes which were   not present on the previous examination.                    SAVANNAH GARCIA M.D., ATTENDING RADIOLOGIST  This document has been electronically signed. Mar 21 2019 11:23AM        < end of copied text >                    ERINN HERNDON M.D., ATTENDING RADIOLOGIST  This document has been electronically signed. Mar 28 2019  3:32PM    < end of copied text >      Studies  Chest X-RAY  CT SCAN Chest   Venous Dopplers: LE:   CT Abdomen  Others

## 2024-01-22 NOTE — ED PROVIDER NOTE - NS_BEDUNITTYPES_ED_ALL_ED
Faxed nasal pillows mask order, chin strap order, ov notes, and CR to L[facility] at [phone] via [employer]ax.   MEDICINE

## 2024-02-13 NOTE — PROGRESS NOTE ADULT - I WAS PHYSICALLY PRESENT FOR THE KEY PORTIONS OF THE EVALUATION AND MANAGEMENT (E/M) SERVICE PROVIDED.  I AGREE WITH THE ABOVE HISTORY, PHYSICAL, AND PLAN WHICH I HAVE REVIEWED AND EDITED WHERE APPROPRIATE
Statement Selected
  GIAN MELISSA  86y, Female    All available historical data reviewed    OVERNIGHT EVENTS:  no fevers  no pulmonary complaints  no pain left foot    ROS:  General: Denies rigors, nightsweats  HEENT: Denies headache, rhinorrhea, sore throat, eye pain  CV: Denies CP, palpitations  PULM: Denies wheezing, hemoptysis  GI: Denies hematemesis, hematochezia, melena  : Denies discharge, hematuria  MSK: Denies arthralgias, myalgias  SKIN: Denies rash, lesions  NEURO: Denies paresthesias, weakness  PSYCH: Denies depression, anxiety    VITALS:  T(F): 98.7, Max: 98.7 (02-13-24 @ 07:45)  HR: 69  BP: 186/81  RR: 18Vital Signs Last 24 Hrs  T(C): 37.1 (13 Feb 2024 08:25), Max: 37.1 (13 Feb 2024 07:45)  T(F): 98.7 (13 Feb 2024 08:25), Max: 98.7 (13 Feb 2024 07:45)  HR: 69 (13 Feb 2024 08:25) (69 - 84)  BP: 186/81 (13 Feb 2024 08:25) (149/65 - 187/74)  BP(mean): 110 (13 Feb 2024 07:45) (107 - 118)  RR: 18 (13 Feb 2024 08:25) (18 - 19)  SpO2: 95% (13 Feb 2024 07:45) (95% - 99%)    Parameters below as of 13 Feb 2024 07:45  Patient On (Oxygen Delivery Method): room air        TESTS & MEASUREMENTS:                        11.2   8.04  )-----------( 252      ( 12 Feb 2024 11:59 )             33.4     02-12    131<L>  |  95<L>  |  17  ----------------------------<  135<H>  4.0   |  18  |  0.6<L>    Ca    8.6      12 Feb 2024 11:59  Phos  2.5     02-12  Mg     1.7     02-12          Culture - Blood (collected 02-11-24 @ 11:45)  Source: .Blood None  Preliminary Report (02-12-24 @ 20:02):    No growth at 24 hours    Culture - Blood (collected 02-10-24 @ 16:45)  Source: .Blood Blood-Peripheral  Preliminary Report (02-13-24 @ 01:02):    No growth at 48 Hours    Culture - Blood (collected 02-10-24 @ 16:45)  Source: .Blood Blood-Peripheral  Preliminary Report (02-13-24 @ 01:02):    No growth at 48 Hours    Culture - Other (collected 02-09-24 @ 20:08)  Source: Wound Wound  Final Report (02-11-24 @ 14:08):    Few Methicillin Resistant Staphylococcus aureus  Organism: Methicillin resistant Staphylococcus aureus (02-11-24 @ 14:08)  Organism: Methicillin resistant Staphylococcus aureus (02-11-24 @ 14:08)      Method Type: GILBERT      -  Ampicillin/Sulbactam: R <=8/4      -  Cefazolin: R <=4      -  Clindamycin: R >4      -  Daptomycin: S <=0.25      -  Erythromycin: R >4      -  Gentamicin: S <=1 Should not be used as monotherapy      -  Linezolid: S 2      -  Oxacillin: R >2      -  Penicillin: R >8      -  Rifampin: S <=1 Should not be used as monotherapy      -  Tetracycline: S <=1      -  Trimethoprim/Sulfamethoxazole: S <=0.5/9.5      -  Vancomycin: S 2    Culture - Urine (collected 02-09-24 @ 20:08)  Source: Clean Catch Clean Catch (Midstream)  Final Report (02-10-24 @ 23:42):    No growth    Culture - Blood (collected 02-09-24 @ 07:30)  Source: .Blood None  Preliminary Report (02-12-24 @ 14:01):    No growth at 72 Hours    Culture - Blood (collected 02-09-24 @ 01:16)  Source: .Blood None  Gram Stain (02-10-24 @ 13:35):    Growth in anaerobic bottle: Gram Positive Cocci in Clusters  Final Report (02-11-24 @ 11:04):    Growth in anaerobic bottle: Methicillin Resistant Staphylococcus aureus    See previous culture [id number]    Culture - Surgical Swab (collected 02-08-24 @ 15:15)  Source: .Surgical Swab None  Preliminary Report (02-11-24 @ 08:18):    Few Methicillin Resistant Staphylococcus aureus  Organism: Methicillin resistant Staphylococcus aureus (02-11-24 @ 08:17)  Organism: Methicillin resistant Staphylococcus aureus (02-11-24 @ 08:17)      Method Type: GILBERT      -  Ampicillin/Sulbactam: R 16/8      -  Cefazolin: R <=4      -  Clindamycin: R >4      -  Daptomycin: S 0.5      -  Erythromycin: R >4      -  Gentamicin: S <=1 Should not be used as monotherapy      -  Linezolid: S 2      -  Oxacillin: R >2      -  Penicillin: R >8      -  Rifampin: S <=1 Should not be used as monotherapy      -  Tetracycline: S <=1      -  Trimethoprim/Sulfamethoxazole: S <=0.5/9.5      -  Vancomycin: S 2    Culture - Fungal, Other (collected 02-08-24 @ 15:15)  Source: .Other None  Preliminary Report (02-10-24 @ 15:04):    Culture is being performed. Fungal cultures are held for 4 weeks.    Culture - Acid Fast - Other w/Smear (collected 02-08-24 @ 15:15)  Source: .Other None  Preliminary Report (02-10-24 @ 15:10):    Culture is being performed.    Culture - Fungal, Other (collected 02-08-24 @ 15:15)  Source: .Other None  Preliminary Report (02-10-24 @ 15:04):    Culture is being performed. Fungal cultures are held for 4 weeks.    Culture - Acid Fast - Other w/Smear (collected 02-08-24 @ 15:15)  Source: .Other None  Preliminary Report (02-10-24 @ 15:10):    Culture is being performed.    Culture - Surgical Swab (collected 02-08-24 @ 15:15)  Source: .Surgical Swab None  Preliminary Report (02-10-24 @ 13:30):    Numerous Staphylococcus aureus  Organism: Methicillin resistant Staphylococcus aureus (02-11-24 @ 08:16)  Organism: Methicillin resistant Staphylococcus aureus (02-11-24 @ 08:16)      Method Type: GILBERT      -  Ampicillin/Sulbactam: R 16/8      -  Cefazolin: R <=4      -  Clindamycin: R >4      -  Daptomycin: S 0.5      -  Erythromycin: R >4      -  Gentamicin: S <=1 Should not be used as monotherapy      -  Linezolid: S 2      -  Oxacillin: R >2      -  Penicillin: R >8      -  Rifampin: S <=1 Should not be used as monotherapy      -  Tetracycline: S <=1      -  Trimethoprim/Sulfamethoxazole: S <=0.5/9.5      -  Vancomycin: S 1    Culture - Fungal, Tissue (collected 02-08-24 @ 15:15)  Source: .Tissue None  Preliminary Report (02-10-24 @ 15:04):    Culture is being performed. Fungal cultures are held for 4 weeks.    Culture - Acid Fast - Tissue w/Smear (collected 02-08-24 @ 15:15)  Source: .Tissue None  Preliminary Report (02-10-24 @ 15:10):    Culture is being performed.    Culture - Tissue with Gram Stain (collected 02-08-24 @ 15:15)  Source: .Tissue None  Gram Stain (02-10-24 @ 16:01):    No polymorphonuclear cells seen per low power field    No organisms seen per oil power field  Preliminary Report (02-10-24 @ 16:01):    Rare Methicillin Resistant Staphylococcus aureus  Organism: Methicillin resistant Staphylococcus aureus (02-10-24 @ 16:01)      Method Type: GILBERT      -  Ampicillin/Sulbactam: R <=8/4      -  Cefazolin: R <=4      -  Clindamycin: R >4      -  Daptomycin: S 0.5      -  Erythromycin: R >4      -  Gentamicin: S <=1 Should not be used as monotherapy      -  Linezolid: S 1      -  Oxacillin: R >2      -  Penicillin: R >8      -  Rifampin: S <=1 Should not be used as monotherapy      -  Tetracycline: S <=1      -  Trimethoprim/Sulfamethoxazole: S <=0.5/9.5      -  Vancomycin: S 2  Organism: Methicillin resistant Staphylococcus aureus (02-10-24 @ 16:00)    Culture - Other (collected 02-08-24 @ 13:29)  Source: .Other left foot wound  Final Report (02-10-24 @ 14:47):    Numerous Methicillin Resistant Staphylococcus aureus  Organism: Methicillin resistant Staphylococcus aureus (02-10-24 @ 14:47)  Organism: Methicillin resistant Staphylococcus aureus (02-10-24 @ 14:47)      Method Type: GILBERT      -  Ampicillin/Sulbactam: R <=8/4      -  Cefazolin: R <=4      -  Clindamycin: R >4      -  Daptomycin: S <=0.25      -  Erythromycin: R >4      -  Gentamicin: S <=1 Should not be used as monotherapy      -  Linezolid: S 2      -  Oxacillin: R >2      -  Penicillin: R >8      -  Rifampin: S <=1 Should not be used as monotherapy      -  Tetracycline: S <=1      -  Trimethoprim/Sulfamethoxazole: S <=0.5/9.5      -  Vancomycin: S 2    Culture - Blood (collected 02-07-24 @ 18:39)  Source: .Blood Blood-Peripheral  Gram Stain (02-08-24 @ 17:17):    Growth in aerobic bottle: Gram Positive Cocci in Clusters    Growth in anaerobic bottle: Gram Positive Cocci in Clusters  Final Report (02-10-24 @ 07:41):    Growth in aerobic and anaerobic bottles: Methicillin Resistant    Staphylococcus aureus    See previous culture [id number]    Culture - Blood (collected 02-07-24 @ 18:39)  Source: .Blood Blood-Peripheral  Gram Stain (02-08-24 @ 15:14):    Growth in aerobic bottle: Gram Positive Cocci in Clusters    Growth in anaerobic bottle: Gram Positive Cocci in Clusters  Final Report (02-10-24 @ 07:40):    Growth in aerobic and anaerobic bottles: Methicillin Resistant    Staphylococcus aureus    Direct identification is available within approximately 3-5    hours either by Blood Panel Multiplexed PCR or Direct    MALDI-TOF. Details: [email or web address]  Organism: Blood Culture PCR  Methicillin resistant Staphylococcus aureus (02-10-24 @ 07:40)  Organism: Methicillin resistant Staphylococcus aureus (02-10-24 @ 07:40)      Method Type: GILBERT      -  Ampicillin/Sulbactam: R <=8/4      -  Cefazolin: R <=4      -  Clindamycin: R >4      -  Daptomycin: S <=0.25      -  Erythromycin: R >4      -  Gentamicin: S <=1 Should not be used as monotherapy      -  Linezolid: S 1      -  Oxacillin: R >2      -  Penicillin: R >8      -  Rifampin: S <=1 Should not be used as monotherapy      -  Tetracycline: S <=1      -  Trimethoprim/Sulfamethoxazole: S <=0.5/9.5      -  Vancomycin: S 1  Organism: Blood Culture PCR (02-10-24 @ 07:40)      Method Type: PCR      -  Methicillin resistant Staphylococcus aureus (MRSA): Detec      Urinalysis Basic - ( 12 Feb 2024 11:59 )    Color: x / Appearance: x / SG: x / pH: x  Gluc: 135 mg/dL / Ketone: x  / Bili: x / Urobili: x   Blood: x / Protein: x / Nitrite: x   Leuk Esterase: x / RBC: x / WBC x   Sq Epi: x / Non Sq Epi: x / Bacteria: x          RADIOLOGY & ADDITIONAL TESTS:  Personal review of radiological diagnostics performed  Echo and EKG results noted when applicable.     MEDICATIONS:  acetaminophen     Tablet .. 650 milliGRAM(s) Oral every 6 hours PRN  aspirin  chewable 81 milliGRAM(s) Oral daily  atorvastatin 40 milliGRAM(s) Oral at bedtime  cefTRIAXone   IVPB 1000 milliGRAM(s) IV Intermittent every 24 hours  cefTRIAXone   IVPB      chlorhexidine 4% Liquid 1 Application(s) Topical two times a day  cholecalciferol 2000 Unit(s) Oral daily  enoxaparin Injectable 40 milliGRAM(s) SubCutaneous every 24 hours  famotidine    Tablet 20 milliGRAM(s) Oral two times a day  folic acid 1 milliGRAM(s) Oral daily  hydrochlorothiazide 50 milliGRAM(s) Oral daily  metoprolol succinate ER 25 milliGRAM(s) Oral daily  metroNIDAZOLE  IVPB 500 milliGRAM(s) IV Intermittent every 12 hours  metroNIDAZOLE  IVPB      oxycodone    5 mG/acetaminophen 325 mG 1 Tablet(s) Oral every 6 hours PRN  saccharomyces boulardii 250 milliGRAM(s) Oral two times a day  senna 2 Tablet(s) Oral at bedtime  sertraline 50 milliGRAM(s) Oral daily  vancomycin  IVPB 750 milliGRAM(s) IV Intermittent every 12 hours      ANTIBIOTICS:  cefTRIAXone   IVPB 1000 milliGRAM(s) IV Intermittent every 24 hours  cefTRIAXone   IVPB      metroNIDAZOLE  IVPB      metroNIDAZOLE  IVPB 500 milliGRAM(s) IV Intermittent every 12 hours  vancomycin  IVPB 750 milliGRAM(s) IV Intermittent every 12 hours    
Statement Selected

## 2024-02-14 NOTE — PATIENT PROFILE ADULT. - ARE SIGNIFICANT INDICATORS COMPLETE.
IABP removed by MD Kojo. Manual pressure held 10 minutes; femstop in place 15 minutes; dressed by RN with gauze and tegaderm with sandbag in place 1hr. Pt tolerated well. No bleeding/hematoma noted.   No Yes

## 2024-03-21 NOTE — PHYSICAL THERAPY INITIAL EVALUATION ADULT - MANUAL MUSCLE TESTING RESULTS, REHAB EVAL
grossly assessed due to/BLE: 3/5 except right foot 2-/5
0 = swallows foods/liquids without difficulty

## 2024-03-28 NOTE — PROGRESS NOTE ADULT - PROBLEM SELECTOR PROBLEM 4
Contacted patient to review outcome of selection committee meeting (See selection committee encounter).   Reviewed next steps based on outcomes:   Patient will not be listed because they are not a candidate right now, Patient needs to come back in a year or so as due to his skin cancer history needs a 2 year wait time through December of 2025-will receive:     -A letter indicating why they did not meet criteria for transplant at [facility].  Confirmed that patient has contact information for additional questions or concerns.      Wound

## 2024-04-04 NOTE — PATIENT PROFILE ADULT - NSPROMEDSPUMP_GEN_A_NUR
Patient consents to remote monitoring.  Patient informed that remote monitoring is not to be used as an emergency response device.  Discussed when to obtain care through urgent care, ER, and when to call PCP/specialist. Discussed that this device is not monitored on evenings, holidays, or weekend and patient's should contact their provider's on-call service for further direction, if needed.  In case of immediate medical emergency, the patient will call their local emergency service.     medication pump(s) used

## 2024-04-11 NOTE — CONSULT NOTE ADULT - ASSESSMENT
Yes 63 yo F PMH RA, hypothyroidism, DM2, COPD presented with acute encephalopathy in setting of elevated TSH.  We are consulted to r/o RA flare.    Plan:    #acute metabolic encephalopathy  -likely 2/2 severe hypothryoid state given elevated TSH  -c/w IV synthroid per endo  -c/w home predisone and naproxen doses 63 yo F PMH RA, hypothyroidism, DM2, COPD presented with acute encephalopathy in setting of elevated TSH.  We are consulted to r/o RA flare.    Plan:    #acute metabolic encephalopathy  -likely 2/2 severe hypothryoid state given elevated TSH  -c/w IV synthroid per endo  -c/w home predisone and naproxen doses  -send SLE and rheumatoid panel  -consider MRI brain to look for leptomeningeal or white matter disease  -attempt to obtain records from OP rheumatologist 63 yo F PMH RA (seropositive, erosive on chronic prednisone), hypothyroidism, DM2, COPD presented with acute encephalopathy in setting of elevated TSH.  We are consulted to r/o RA flare.    Plan:    #acute metabolic encephalopathy  -likely 2/2 severe hypothyroid state given elevated TSH  -c/w IV synthroid per endo  -c/w home prednisone and naproxen doses  -send serologies - ALESSIA, dsDNA, ZAFAR, Ribosomal P and NMDAR antibody  -Rare cases of acute encephalopathy have been reported with  long standing RA but will need more evidence to consider this diagnosis - suggest MRI brain to look for leptomeningeal or white matter disease or presence of rheumatoid lesions/nodules in the brain. Also will suggest MRA/MRV if MRI negative. Will likely require LP for further confirmation.   The other possibility is that of PACNS or autoimmune encephalitis which can also be confirmed with studies suggested above.    -attempt to obtain records from OP rheumatologist

## 2024-05-06 NOTE — PROGRESS NOTE ADULT - SUBJECTIVE AND OBJECTIVE BOX
Pulmonary Progress Note     Interval Events: No acute events    Subjective. Feeling OK, able to cough, move her head up from the pillow.     OBJECTIVE:  ICU Vital Signs Last 24 Hrs  T(C): 38 (10 Soy 2019 12:00), Max: 38 (10 Soy 2019 12:00)  T(F): 100.4 (10 Soy 2019 12:00), Max: 100.4 (10 Syo 2019 12:00)  HR: 95 (10 Soy 2019 15:00) (58 - 105)  ABP: 167/88 (10 Soy 2019 15:00) (103/63 - 167/88)  ABP(mean): 120 (10 Soy 2019 15:00) (79 - 120)  RR: 31 (10 Soy 2019 15:00) (15 - 31)  SpO2: 96% (10 Soy 2019 15:00) (91% - 98%)    Mode: CPAP with PS, FiO2: 40, PEEP: 10, PS: 5, MAP: 12, PIP: 18    01-09 @ 07:01  -  01-10 @ 07:00  --------------------------------------------------------  IN: 2191.2 mL / OUT: 3805 mL / NET: -1613.8 mL    01-10 @ 07:01  -  01-10 @ 15:28  --------------------------------------------------------  IN: 585.7 mL / OUT: 1125 mL / NET: -539.3 mL    CAPILLARY BLOOD GLUCOSE    POCT Blood Glucose.: 193 mg/dL (10 Soy 2019 11:29)    PHYSICAL EXAM:  General: NAD, appears comfortably on vent  HEENT: NCAT, MOM, EOMI  Neck: Supple  Respiratory: coarse breath sounds bilaterally   Cardiovascular: RRR, S1/S2, (-) m/g/r, (-) pitting edema  Extremities: (-) clubbing/cyanosis/edema  Skin: c/d/i. NGT in place   Neurological: No new focal findings  Psychiatry: Appropriate    HOSPITAL MEDICATIONS:  MEDICATIONS  (STANDING):  ALBUTerol    90 MICROgram(s) HFA Inhaler 1 Puff(s) Inhalation every 6 hours  amLODIPine   Tablet 10 milliGRAM(s) Oral daily  buDESOnide 160 MICROgram(s)/formoterol 4.5 MICROgram(s) Inhaler 2 Puff(s) Inhalation two times a day  chlorhexidine 0.12% Liquid 15 milliLiter(s) Swish and Spit two times a day  chlorhexidine 4% Liquid 1 Application(s) Topical <User Schedule>  dexmedetomidine Infusion 0.3 MICROgram(s)/kG/Hr (8.415 mL/Hr) IV Continuous <Continuous>  dextrose 50% Injectable 25 milliLiter(s) IV Push once  fentaNYL   Infusion 0.5 MICROgram(s)/kG/Hr (5.61 mL/Hr) IV Continuous <Continuous>  furosemide   Injectable 40 milliGRAM(s) IV Push two times a day  heparin  Injectable 5000 Unit(s) SubCutaneous every 8 hours  hydrocortisone sodium succinate Injectable 50 milliGRAM(s) IV Push every 12 hours  influenza   Vaccine 0.5 milliLiter(s) IntraMuscular once  insulin lispro (HumaLOG) corrective regimen sliding scale   SubCutaneous every 6 hours  insulin NPH human recombinant 14 Unit(s) SubCutaneous every 6 hours  levothyroxine Injectable 96 MICROGram(s) IV Push at bedtime  metoprolol tartrate Injectable 5 milliGRAM(s) IV Push every 6 hours  pantoprazole  Injectable 40 milliGRAM(s) IV Push daily  simvastatin 20 milliGRAM(s) Oral at bedtime  sodium chloride 3%  Inhalation 4 milliLiter(s) Inhalation every 6 hours    MEDICATIONS  (PRN):      LABS:                        8.9    13.83 )-----------( 571      ( 10 Soy 2019 02:45 )             27.9     Hgb Trend: 8.9<--, 8.8<--, 7.5<--, 6.8<--, 7.6<--  01-10    147<H>  |  101  |  21  ----------------------------<  133<H>  4.3   |  33<H>  |  0.89    Ca    9.4      10 Soy 2019 09:10  Phos  2.6     01-10  Mg     2.5     01-10      Creatinine Trend: 0.89<--, 0.92<--, 1.08<--, 1.04<--, 0.91<--, 0.88<--      Arterial Blood Gas:  01-10 @ 11:30  7.49/45/61/33/90.7/9.3  ABG lactate: 1.2  Arterial Blood Gas:  01-10 @ 09:10  7.47/49/74/34/94.4/10.6  ABG lactate: 1.4  Arterial Blood Gas:  01-10 @ 02:45  7.46/48/58/33/86.4/9.6  ABG lactate: 2.6  Arterial Blood Gas:  01-09 @ 20:45  7.45/48/68/32/93.9/8.3  ABG lactate: 1.8  Arterial Blood Gas:  01-09 @ 18:10  7.45/50/56/33/87.9/9.3  ABG lactate: 2.2  Arterial Blood Gas:  01-09 @ 09:18  7.44/47/63/31/88.2/7.4  ABG lactate: --  Arterial Blood Gas:  01-09 @ 02:30  7.47/44/71/31/93.8/7.7  ABG lactate: 2.3  Arterial Blood Gas:  01-08 @ 17:38  7.45/48/128/32/97.8/8.2  ABG lactate: --    MICROBIOLOGY:   Culture - Respiratory with Gram Stain (collected 09 Jan 2019 12:31)  Source: BRONCHIAL LAVAGE  Preliminary Report (10 Soy 2019 14:24):    NRF^Normal Respiratory Perla    QUANTITY OF GROWTH: MANY    RADIOLOGY:  [x] Reviewed and interpreted by me [phone]

## 2024-05-20 NOTE — ED PROVIDER NOTE - TEMPLATE, MLM
Operative Note      Patient: Florecita Wing  YOB: 1968  MRN: [id number]    Date of Procedure: 5/20/2024    Pre-Op Diagnosis Codes:     * Wedge compression fracture of unspecified thoracic vertebra, initial encounter for closed fracture (HCC) [S22.000A]     * Chronic pain syndrome [G89.4]    Post-Op Diagnosis: Post-Op Diagnosis Codes:     * Wedge compression fracture of unspecified thoracic vertebra, initial encounter for closed fracture (HCC) [S22.000A]     * Chronic pain syndrome [G89.4]       Procedure(s):  PERMANENT INTRATECAL PAIN PUMP INSERTION    Surgeon(s):  Segun Brasher MD    Assistant:   Physician Assistant: Linda Carballo PA-C    Anesthesia: General    Estimated Blood Loss (mL): Minimal    Complications: None    Specimens:   * No specimens in log *    Implants:  Implant Name Type Inv. Item Serial No.  Lot No. LRB No. Used Action   CATHETER ITH L86CM ASCENDA SPNL SEG - [id number]  CATHETER ITH L86CM ASCENDA SPNL SEG  [employer] [id number] N/A 1 Implanted   PUMP INT THCL INFUSION 20 ML PROGRAMMABLE CTRL WORKFLOW - [id number]  PUMP INT THCL INFUSION 20 ML PROGRAMMABLE CTRL WORKFLOW  [employer]  Right 1 Implanted         Drains: * No LDAs found *    Findings:  Infection Present At Time Of Surgery (PATOS) (choose all levels that have infection present):  No infection present  Other Findings: None    Detailed Description of Procedure:   Pt discussed of Procedure, Informed consent obtained, 2 gm Kefsol IV started, Left lateral on OR Table.Time out performed. Skin prepped with Chloraprep, Fluroscope with Sterile drape got to the field, Skin marked for needle placement using Fluroscopy, anesthetised with Total 8cc 2% Lidocaine. 16G needle advanced towards  L3-4 epidural space using AP and Lateral views and Return of CSF, a silastic catheter was then advanced through the needles to Top of T8 vertebral body. A half inch incision was made around the needle that was  General

## 2024-06-11 NOTE — PROGRESS NOTE ADULT - THIS PATIENT HAS THE FOLLOWING CONDITION(S)/DIAGNOSES ON THIS ADMISSION:
Brain Compression / Herniation/Acute Respiratory Failure LABS:                         13.3   10.26 )-----------( 202      ( 10 Antonio 2024 22:55 )             38.1     06-10    130<L>  |  101  |  41<H>  ----------------------------<  95  4.0   |  15<L>  |  0.93    Ca    8.5      10 Antonio 2024 22:55  Phos  2.7     06-10  Mg     2.2     06-10    TPro  6.1  /  Alb  2.5<L>  /  TBili  1.5<H>  /  DBili  x   /  AST  See Note  /  ALT  46<H>  /  AlkPhos  57  06-10    PT/INR - ( 10 Antonio 2024 12:47 )   PT: 24.2 sec;   INR: 2.17       PTT - ( 10 Antonio 2024 12:47 )  PTT:39.1 sec  Urinalysis Basic - ( 10 Antonio 2024 22:55 )    Color: x / Appearance: x / SG: x / pH: x  Gluc: 95 mg/dL / Ketone: x  / Bili: x / Urobili: x   Blood: x / Protein: x / Nitrite: x   Leuk Esterase: x / RBC: x / WBC x   Sq Epi: x / Non Sq Epi: x / Bacteria: x    CARDIAC MARKERS ( 10 Antonio 2024 16:12 )  x     / x     / 1556 U/L / x     / 8.5 ng/mL    Lactate, Blood: 3.4 mmol/L (06-10 @ 22:26)  Lactate, Blood: 5.8 mmol/L (06-10 @ 15:50)  Lactate, Blood: 3.7 mmol/L (06-10 @ 14:11)      RADIOLOGY, EKG & ADDITIONAL TESTS:

## 2024-06-14 NOTE — PROGRESS NOTE ADULT - PROBLEM SELECTOR PLAN 1
Reviewed Martina's message with Whitney Schofield and she verbalized understanding.    Thank you,  Sana QUARLES RN  Triage Nurse LCG   13:47 EDT   - She is optimized from psychiatric perspective and stable for discharge. Will continue Zyprexa BID. No psychiatric contraindication to discharge.   - Patient does not have capacity to refuse essential medications: If patient refusing synthroid or prednisone PO, intravenous formulations should be given. Insulin should be given prior to meals.

## 2024-09-15 NOTE — PROGRESS NOTE BEHAVIORAL HEALTH - NSBHCONSULTRECOMMENDOTHER_PSY_A_CORE FT
1) consider lowering ativan to 0.5 mg po TID for now  2) pt's nirr, Soha to inform team if either geodon, abilify, or risperdal has been used in the past, and if not, which she may give team consent to give for agitation/mood disturbance  3) f/u QTc please  4) no indication for inpt psychiatric admission 4 = No assist / stand by assistance

## 2024-12-17 NOTE — PROGRESS NOTE ADULT - MS EXT PE MLT D E PC
Anesthesia Post-op Note    Patient: Colleen Gongora  Procedure(s) Performed: COLONOSCOPY with biopsies  Anesthesia type: MAC    Vitals Value Taken Time   Temp 36.6 °C (97.9 °F) 12/17/24 1120   Pulse 71 12/17/24 1120   Resp 22 12/17/24 1120   SpO2 99 % 12/17/24 1120   BP 92/43 12/17/24 1120         Patient Location: Phase II  Post-op Vital Signs:stable  Level of Consciousness: awake and alert  Respiratory Status: spontaneous ventilation  Cardiovascular stable  Hydration: euvolemic  Pain Management: adequately controlled  Handoff: Handoff to receiving clinician was performed and questions were answered  Vomiting: none  Nausea: None  Airway Patency:patent  Post-op Assessment: no complications and patient tolerated procedure well      No notable events documented.                       no clubbing/no cyanosis/pedal edema

## 2025-01-30 NOTE — ED PROVIDER NOTE - EYES, MLM
Goal Outcome Evaluation:            Pt rested between care. Complained of a headache at the beginning og the shift and some mild anxiety. Pt was given tylenol and ativan per MAR.  Remains in afib at this time ranging . Asymptomatic otherwise- no chest pain or SOB.  She is currently on room air. In bed with bed alarm set and call light within reach at this time.                                  Clear bilaterally, pupils equal, round and reactive to light.

## 2025-02-03 NOTE — PROGRESS NOTE BEHAVIORAL HEALTH - [facility] ED [facility] MED ROS HEMATOLOGIC LYMPHATIC
-- DO NOT REPLY / DO NOT REPLY ALL --  -- This inbox is not monitored. If this was sent to the wrong provider or department, reroute message to P ECO Reroute pool. --  -- Message is from Engagement Center Operations (ECO) --    General Patient Message:  [facility] stating they were contacted by Dr Orestes Jenkins requesting controlled substance for patient. Gabapentin. Pharmacy called to confirm. Writer pulled up patient. Patient has never been seen by [facility] and has not been seen by advocate since 2017. Pharmacy is asking for Dr to be paged. On call paged.   Caller Information       Contact Date/Time Type Contact Phone/Fax    02/01/2025 04:50 PM CST Phone (Incoming) Darling Peña             Alternative phone number: N/a    Can a detailed message be left? No  Patient has been advised the message will be addressed within 2-3 business days.              Copied from CRM #[id number]. Topic: MW Messaging - MW Patient Request  >> Feb 1, 2025  5:04 PM Marcela GALLO wrote:  Darling Peña called requesting to send a general message to clinician.   Verified issue is NOT regarding a symptom(s) requiring routine or emergent triage. Verified another message template type and CRM does not apply.    Selected 'Wrap Up CRM' and created new Telephone Encounter after clicking 'Convert to Clinical Call'. Selected appropriate Reason for Call.  Sent Pt message template and routed as routine priority per Clinician KB page to appropriate clinician pool. Readback full message.  
Not my patient.   
Spoke with pharmacist Ame Jenkins called in 800 mg gabapentin 4 x daily  with 3 refills- gave correct NPI- pharmacist was suspicious and had on call paged- she was instructed by on call not to fill- pharmacist called Pt to inform it would not be filled- pharmacist put a note in Pt's file not to fill any medications by Dr. Jenkins without speaking to our office- Pt may also go by Devon Peña as there are 2 profiles with same address  
No complaints
Unable to assess
No complaints
Unable to assess

## 2025-02-14 NOTE — DISCHARGE NOTE ADULT - MEDICATION SUMMARY - MEDICATIONS TO TAKE
Patient was seen today and calling back with decision to be induced tonight. Please advise    I will START or STAY ON the medications listed below when I get home from the hospital:    BIPAP  -- BIPAP  for use HS/PRN  20/10/21%  -- Indication: For Acute on chronic respiratory failure    predniSONE 10 mg oral tablet  -- 1 tab(s) by mouth once a day  -- Indication: For RA (Rheumatoid Arthritis)    sildenafil 20 mg oral tablet  -- 1 tab(s) by mouth 3 times a day  -- Indication: For Pulmonary Hypertension    acetaminophen 325 mg oral tablet  -- 2 tab(s) by mouth every 6 hours, As needed, Temp greater or equal to 38C (100.4F), Moderate Pain (4 - 6)  -- Indication: For Fevers    fentaNYL 25 mcg/hr transdermal film, extended release  -- 1 patch by transdermal patch every 72 hours  -- Indication: For Pain    naproxen 500 mg oral tablet  -- 1 tab(s) by mouth 2 times a day  -- Indication: For Pain    oxyCODONE 5 mg oral tablet  -- 1 tab(s) by mouth every 4 hours, As needed, moderate and severe pain  -- Indication: For Pain    traMADol 50 mg oral tablet  -- 1 tab(s) by mouth 2 times a day  -- Indication: For Pain    gabapentin 300 mg oral capsule  -- 1 cap(s) by mouth 3 times a day  -- Indication: For Pain    insulin glargine  -- 28 unit(s) subcutaneous once a day (at bedtime)  -- Indication: For Type 2 diabetes mellitus    HumaLOG 100 units/mL subcutaneous solution  -- 0 Unit(s) if Glucose 61 - 250  2 Unit(s) if Glucose 251 - 300  4 Unit(s) if Glucose 301 - 350  6 Unit(s) if Glucose 351 - 400  8 Unit(s) if Glucose Greater Than 400  -- Indication: For Type 2 diabetes mellitus    HumaLOG 100 units/mL subcutaneous solution  -- 2 Unit(s) if Glucose 151 - 200  4 Unit(s) if Glucose 201 - 250  6 Unit(s) if Glucose 251 - 300  8 Unit(s) if Glucose 301 - 350  10 Unit(s) if Glucose 351 - 400  12 Unit(s) if Glucose Greater Than 400  -- Indication: For Type 2 diabetes mellitus    HumaLOG 100 units/mL subcutaneous solution  -- 15 unit(s) subcutaneous once a day with breakfast  -- Indication: For Type 2 diabetes mellitus    HumaLOG 100 units/mL subcutaneous solution  -- 11 unit(s) subcutaneous 2 times a day at lunch and dinner  -- Indication: For Type 2 diabetes mellitus    Claritin 10 mg oral tablet  -- 1 tab(s) by mouth once a day  -- Indication: For Chronic Asthma    simvastatin 20 mg oral tablet  -- 1 tab(s) by mouth once a day (at bedtime)  -- Indication: For Hyperlipidemia    alendronate  -- 1 tab(s) by mouth once a week  -- Indication: For Bone Health    Advair Diskus 250 mcg-50 mcg inhalation powder  -- 1 puff(s) inhaled 2 times a day   -- Check with your doctor before becoming pregnant.  For inhalation only.  Obtain medical advice before taking any non-prescription drugs as some may affect the action of this medication.  Rinse mouth thoroughly after use.    -- Indication: For Acute on chronic respiratory failure    albuterol 90 mcg/inh inhalation aerosol  -- 1 puff(s) inhaled every 4 hours as needed  -- Indication: For Acute on chronic respiratory failure    tiotropium 18 mcg inhalation capsule  -- 1 cap(s) inhaled once a day  -- Indication: For Acute on chronic respiratory failure    collagenase 250 units/g topical ointment  -- 1 application on skin once a day  -- Indication: For Wound    furosemide 40 mg oral tablet  -- 1 tab(s) by mouth 2 times a day  -- Indication: For Pulmonary Hypertension    guaiFENesin 100 mg/5 mL oral liquid  -- 10 milliliter(s) by mouth every 6 hours  -- Indication: For Cough    psyllium 3.4 g/7 g oral powder for reconstitution  -- 1 packet(s) by mouth once a day  -- Indication: For Diarrhea    potassium chloride 20 mEq oral powder for reconstitution  -- 2 packet(s) by mouth once a day  -- Indication: For Vitamins    sodium chloride 0.65% nasal spray  -- 1 spray(s) into nose 4 times a day, As Needed  -- Indication: For Congestion    amoxicillin-clavulanate 875 mg-125 mg oral tablet  -- 1 tab(s) by mouth 2 times a day through 3/25  -- Indication: For Sinusitus    lactobacillus acidophilus oral capsule  -- 1 tab(s) by mouth 3 times a day  -- Indication: For Probiotics    pantoprazole 40 mg oral delayed release tablet  -- 1 tab(s) by mouth once a day (before a meal)  -- Indication: For GERD    levothyroxine 137 mcg (0.137 mg) oral tablet  -- 1 tab(s) by mouth   -- Indication: For Hypothyroid I will START or STAY ON the medications listed below when I get home from the hospital:    BIPAP  -- BIPAP  for use HS/PRN  20/10/21%  -- Indication: For Acute on chronic respiratory failure    predniSONE 10 mg oral tablet  -- 1 tab(s) by mouth once a day  -- Indication: For RA (Rheumatoid Arthritis)    sildenafil 20 mg oral tablet  -- 1 tab(s) by mouth 3 times a day  -- Indication: For Pulmonary Hypertension    acetaminophen 325 mg oral tablet  -- 2 tab(s) by mouth every 6 hours, As needed, Temp greater or equal to 38C (100.4F), Moderate Pain (4 - 6)  -- Indication: For Fevers    fentaNYL 25 mcg/hr transdermal film, extended release  -- 1 patch by transdermal patch every 72 hours  -- Indication: For Pain    naproxen 500 mg oral tablet  -- 1 tab(s) by mouth 2 times a day  -- Indication: For Pain    oxyCODONE 5 mg oral tablet  -- 1 tab(s) by mouth every 4 hours, As needed, moderate and severe pain  -- Indication: For Pain    traMADol 50 mg oral tablet  -- 1 tab(s) by mouth 2 times a day  -- Indication: For Pain    gabapentin 300 mg oral capsule  -- 1 cap(s) by mouth 3 times a day  -- Indication: For Pain    primidone  -- 25 milligram(s) by mouth once a day  -- Indication: For Tremors of nervous system    insulin glargine  -- 28 unit(s) subcutaneous once a day (at bedtime)  -- Indication: For Type 2 diabetes mellitus    HumaLOG 100 units/mL subcutaneous solution  -- 0 Unit(s) if Glucose 61 - 250  2 Unit(s) if Glucose 251 - 300  4 Unit(s) if Glucose 301 - 350  6 Unit(s) if Glucose 351 - 400  8 Unit(s) if Glucose Greater Than 400  -- Indication: For Type 2 diabetes mellitus    HumaLOG 100 units/mL subcutaneous solution  -- 2 Unit(s) if Glucose 151 - 200  4 Unit(s) if Glucose 201 - 250  6 Unit(s) if Glucose 251 - 300  8 Unit(s) if Glucose 301 - 350  10 Unit(s) if Glucose 351 - 400  12 Unit(s) if Glucose Greater Than 400  -- Indication: For Type 2 diabetes mellitus    HumaLOG 100 units/mL subcutaneous solution  -- 15 unit(s) subcutaneous once a day with breakfast  -- Indication: For Type 2 diabetes mellitus    HumaLOG 100 units/mL subcutaneous solution  -- 11 unit(s) subcutaneous 2 times a day at lunch and dinner  -- Indication: For Type 2 diabetes mellitus    Claritin 10 mg oral tablet  -- 1 tab(s) by mouth once a day  -- Indication: For Chronic Asthma    simvastatin 20 mg oral tablet  -- 1 tab(s) by mouth once a day (at bedtime)  -- Indication: For Hyperlipidemia    alendronate  -- 1 tab(s) by mouth once a week  -- Indication: For Bone Health    Advair Diskus 250 mcg-50 mcg inhalation powder  -- 1 puff(s) inhaled 2 times a day   -- Check with your doctor before becoming pregnant.  For inhalation only.  Obtain medical advice before taking any non-prescription drugs as some may affect the action of this medication.  Rinse mouth thoroughly after use.    -- Indication: For Acute on chronic respiratory failure    albuterol 90 mcg/inh inhalation aerosol  -- 1 puff(s) inhaled every 4 hours as needed  -- Indication: For Acute on chronic respiratory failure    tiotropium 18 mcg inhalation capsule  -- 1 cap(s) inhaled once a day  -- Indication: For Acute on chronic respiratory failure    collagenase 250 units/g topical ointment  -- 1 application on skin once a day  -- Indication: For Wound    furosemide 40 mg oral tablet  -- 1 tab(s) by mouth 2 times a day  -- Indication: For Pulmonary Hypertension    guaiFENesin 100 mg/5 mL oral liquid  -- 10 milliliter(s) by mouth every 6 hours  -- Indication: For Cough    psyllium 3.4 g/7 g oral powder for reconstitution  -- 1 packet(s) by mouth once a day  -- Indication: For Diarrhea    sodium chloride 0.65% nasal spray  -- 1 spray(s) into nose 4 times a day, As Needed  -- Indication: For Congestion    amoxicillin-clavulanate 875 mg-125 mg oral tablet  -- 1 tab(s) by mouth 2 times a day through 3/25  -- Indication: For Sinusitus    lactobacillus acidophilus oral capsule  -- 1 tab(s) by mouth 3 times a day  -- Indication: For Probiotics    pantoprazole 40 mg oral delayed release tablet  -- 1 tab(s) by mouth once a day (before a meal)  -- Indication: For GERD    levothyroxine 137 mcg (0.137 mg) oral tablet  -- 1 tab(s) by mouth   -- Indication: For Hypothyroid

## 2025-03-20 NOTE — DIETITIAN INITIAL EVALUATION ADULT. - EST PROTEIN NEEDS12
I performed this visit using realtime telehealth tools, including an audio/video OR telephone connection between the patient listed who was located in the [place] and myself, Emelina Perez (Board certified in the [facility]).  At the start of the visit, I introduced myself as Dr. Rojas and verified the patients name, , and current physical location.    If they were currently outside of the state of [place], the visit was ended and the patient was referred to alternative means for evaluation and treatment.   The patient was made aware of the limitations of the telehealth visit.  They will not be physically examined and all issues may not be appropriate for a telehealth visit.  If necessary, an in person referral will be made.      DISCLAIMER:   In preparing for this visit and writing this note, I reviewed previous electronic medical records (labs, imaging and medical charts) of the patient available in the physician portal. Significant findings which helped in decision making are recorded in this encounter charting.    All allergies were reviewed with the patient and all medications reconciled with   the patient.    Chief Complaint: urinary symptoms    Pain or burning with urination   Need to urinate more frequently  Blood in your urine  Need to urinate at night  More urgent need to urinate    HPI:    > 4 months since last UTI? Yes-- 10 years  Sxs have persisted for 1 days  Dysuria?Yes  Frequency? Yes  Urgency? Yes  Blood in urine? yes    Fever, chills, body aches? No  N,V,Diarrhea? No    Abnormal vaginal bleeding? No  Abnormal vaginal pain? No  Abnormal vaginal discharge No    Flank pain? No  Abdominal pain?  No    H/o kidney stones?No  H/o kidney infection? No  LMP-- 3/8    OTC meds? No    All other ROS (-)    Physical Exam:  Alert in NAD  Affect normal  Eyes clear  No resp distress or audible wheezing  CVA TTP  No  Abdominal TTP? No    Dysuria  Check urine culture  No  Take antibiotic as  directed---Bactrim as written-- BID x 5d   Rest and drink plenty of fluids  Follow up If no improvement or if symptoms worsen in 48 hours OR if symptoms persist after completing the antibiotics OR if you develop fevers, severe back or abdominal pain or any other concerning symptoms.     ____________________________________________________________________________________    Discussed with patient during visit  differential diagnosis; viral versus bacterial infection;  (if relevant); use of medications prescribed and possible side effects; appropriate over-the-counter medications; possible complications ; when to follow-up for in person evaluation.  All patient's questions were answered. Patient has good decision making capacity.  They are alert to person, place, time and situation.  Patient has the ability to communicate choice, understand information, consequences, and reason rationally.  If sent for in person care at  or ED,    I explained why Urgent in person exam was necessary and that failure to have further evaluation, and treatment today may lead to, negative outcomes, permanent disability, or even death.   If not sent for in person care today,   follow-up with your PCP in 2-3 days, sooner if not  improving.    Follow-up immediately if symptoms worsen.   If experiencing symptoms including but not limited to lethargy / chest pain / weakness / dizziness / difficulty breathing please call 911 or go to the closest emergency department for immediate care.   Limitations to telemedicine include inability to do a complete and accurate physical exam.    Any concerns regarding this were conveyed with the patient and in person follow-up recommended if the nature of their concern/illness does not progress as anticipated during this visit.     85.68

## 2025-07-01 NOTE — PROGRESS NOTE ADULT - ATTENDING COMMENTS
[facility] APPOINTMENT RESCHEDULE        Date: 11/26     Time: 1 PM     Provider name:Dr. Lorenzo Trinidad      Type: In-office visit     Is patient currently in a facility? No     Alternative contact information:      Appointment cancelled or rescheduled: call outcome: left voicemail         Jeovany Daly MD  Pager [phone]  After 5pm/weekends call [phone]